# Patient Record
Sex: MALE | Race: WHITE | NOT HISPANIC OR LATINO | Employment: FULL TIME | URBAN - METROPOLITAN AREA
[De-identification: names, ages, dates, MRNs, and addresses within clinical notes are randomized per-mention and may not be internally consistent; named-entity substitution may affect disease eponyms.]

---

## 2021-01-19 ENCOUNTER — HOME INFUSION (PRE-WILLOW HOME INFUSION) (OUTPATIENT)
Dept: PHARMACY | Facility: CLINIC | Age: 56
End: 2021-01-19

## 2021-08-30 ENCOUNTER — TRANSFERRED RECORDS (OUTPATIENT)
Dept: HEALTH INFORMATION MANAGEMENT | Facility: CLINIC | Age: 56
End: 2021-08-30

## 2021-09-02 NOTE — PROGRESS NOTES
[facility]  Transfer Triage Note    Date of call: 09/02/21  Time of call: 5:28 PM    Is pandemic COVID-19 a concern? NO    Reason for transfer: Further diagnostic work up, management, and consultation for specialized care   Diagnosis: interstitial lung disease exacerbation.     Outside Records: Available  Additional records requested to be faxed to [phone].    Stability of Patient: Patient is vitally stable, with no critical labs, and will likely remain stable throughout the transfer process  ICU: No    Expected Time of Arrival for Transfer: greater than 24 hours    Arrival Location:  [address] [address] Phone: [phone]    Recommendations for Management and Stabilization: Not needed    Additional Comments: 57 yo man with history of RA and RA associated interstitial lung disease, hypothyroidism, admitted with acute on chronic hypoxemic respiratory failure (usually on 4 LPM), now on HFNC 60 LPM 90%, solumedrol 60 mg q6h, s/p 2 doses of rituximab.  Admitted since 8/30. Progressive interstitial lung disease, CT shows worsening with some traction bronchiectasis. Infectious workup has been negative.  Presently on Cefepime, doxycycline, metronidazole. Echocardiogram reveals normal left ventricular ejection fraction.      Viral panel negative    Fungal studies negative    Creatinine 0.6    BMI 29    History of smokeless tobacco but no smoking    Eating well    Patient requires transfer for transplant evaluation and will accept, [place] [facility] [place]  Discussed with Dr. Pereira as well from pulmonary.         Moises Lloyd MD     ADDENDUM:  Discussed w Dr. Osei at [place]  Had to go on BiPAP 9/3, desat w/minimal activity with 60%FiO2. Helped with his symptoms. Short breaks off BiPAP while at rest. Still not hypervolemic. Trace edema in legs, lasix x 1 9/3. He feels better on BiPAP. Repeat imaging--extensive changes due to ILD.  Nothing new to explain progressive symptoms. Now 125 mg q 6hr steroids. Still await [facility] bed  Lloyd Carrera MD     ADDENDUM 2  On BiPAP overnight, then HFNC during day unless active in which case he needs BiPAP. Last lasix 2 days ago, no more edema in legs today. Fungal serology panel negative x 2. Urine histo antigen not back yet. Initially got rituxamab for RA--didn't get 2nd dose on time due to insurance, but subsequently did receive it 9/2. Currently on doxy, flagyl, bactrim, cefepime. Last bronch 2 months ago had candida so is also on fluconazole.  Will arrive via plane later today  Lloyd Carrera MD

## 2021-09-05 ENCOUNTER — HOSPITAL ENCOUNTER (INPATIENT)
Facility: CLINIC | Age: 56
LOS: 99 days | Discharge: NURSING, REHAB OR LONG-TERM CARE | End: 2021-12-13
Attending: STUDENT IN AN ORGANIZED HEALTH CARE EDUCATION/TRAINING PROGRAM | Admitting: INTERNAL MEDICINE
Payer: COMMERCIAL

## 2021-09-05 ENCOUNTER — APPOINTMENT (OUTPATIENT)
Dept: GENERAL RADIOLOGY | Facility: CLINIC | Age: 56
End: 2021-09-05
Attending: PHYSICIAN ASSISTANT
Payer: COMMERCIAL

## 2021-09-05 DIAGNOSIS — J84.9 ILD (INTERSTITIAL LUNG DISEASE) (H): Primary | ICD-10-CM

## 2021-09-05 DIAGNOSIS — Z94.2 S/P LUNG TRANSPLANT (H): Chronic | ICD-10-CM

## 2021-09-05 DIAGNOSIS — R62.7 FAILURE TO THRIVE IN ADULT: ICD-10-CM

## 2021-09-05 DIAGNOSIS — Z99.11 VENTILATOR DEPENDENCE (H): ICD-10-CM

## 2021-09-05 DIAGNOSIS — Z77.21 EXPOSURE TO BLOOD OR BODY FLUID: ICD-10-CM

## 2021-09-05 LAB
BASE EXCESS BLDA CALC-SCNC: 0.6 MMOL/L (ref -9–1.8)
BASE EXCESS BLDV CALC-SCNC: 0.8 MMOL/L (ref -7.7–1.9)
GLUCOSE BLDC GLUCOMTR-MCNC: 198 MG/DL (ref 70–99)
GLUCOSE BLDC GLUCOMTR-MCNC: 204 MG/DL (ref 70–99)
GLUCOSE BLDC GLUCOMTR-MCNC: 215 MG/DL (ref 70–99)
HBA1C MFR BLD: 6.5 % (ref 0–5.6)
HCO3 BLD-SCNC: 23 MMOL/L (ref 21–28)
HCO3 BLDV-SCNC: 24 MMOL/L (ref 21–28)
O2/TOTAL GAS SETTING VFR VENT: 60 %
O2/TOTAL GAS SETTING VFR VENT: 60 %
OXYHGB MFR BLD: 94 % (ref 92–100)
OXYHGB MFR BLDV: 94 % (ref 70–75)
PCO2 BLD: 31 MM HG (ref 35–45)
PCO2 BLDV: 32 MM HG (ref 40–50)
PH BLD: 7.48 [PH] (ref 7.35–7.45)
PH BLDV: 7.48 [PH] (ref 7.32–7.43)
PO2 BLD: 85 MM HG (ref 80–105)
PO2 BLDV: 90 MM HG (ref 25–47)

## 2021-09-05 PROCEDURE — 5A09557 ASSISTANCE WITH RESPIRATORY VENTILATION, GREATER THAN 96 CONSECUTIVE HOURS, CONTINUOUS POSITIVE AIRWAY PRESSURE: ICD-10-PCS | Performed by: INTERNAL MEDICINE

## 2021-09-05 PROCEDURE — 82805 BLOOD GASES W/O2 SATURATION: CPT | Performed by: PHYSICIAN ASSISTANT

## 2021-09-05 PROCEDURE — 250N000011 HC RX IP 250 OP 636: Performed by: STUDENT IN AN ORGANIZED HEALTH CARE EDUCATION/TRAINING PROGRAM

## 2021-09-05 PROCEDURE — 250N000011 HC RX IP 250 OP 636: Performed by: PHYSICIAN ASSISTANT

## 2021-09-05 PROCEDURE — 999N000065 XR CHEST PORT 1 VIEW

## 2021-09-05 PROCEDURE — 36415 COLL VENOUS BLD VENIPUNCTURE: CPT | Performed by: STUDENT IN AN ORGANIZED HEALTH CARE EDUCATION/TRAINING PROGRAM

## 2021-09-05 PROCEDURE — 82805 BLOOD GASES W/O2 SATURATION: CPT | Performed by: STUDENT IN AN ORGANIZED HEALTH CARE EDUCATION/TRAINING PROGRAM

## 2021-09-05 PROCEDURE — 250N000009 HC RX 250: Performed by: PHYSICIAN ASSISTANT

## 2021-09-05 PROCEDURE — 250N000013 HC RX MED GY IP 250 OP 250 PS 637: Performed by: PHYSICIAN ASSISTANT

## 2021-09-05 PROCEDURE — 99207 PR APP CREDIT; MD BILLING SHARED VISIT: CPT | Performed by: PHYSICIAN ASSISTANT

## 2021-09-05 PROCEDURE — 71045 X-RAY EXAM CHEST 1 VIEW: CPT | Mod: 26 | Performed by: RADIOLOGY

## 2021-09-05 PROCEDURE — 99223 1ST HOSP IP/OBS HIGH 75: CPT | Mod: AI | Performed by: INTERNAL MEDICINE

## 2021-09-05 PROCEDURE — 999N000157 HC STATISTIC RCP TIME EA 10 MIN

## 2021-09-05 PROCEDURE — 120N000003 HC R&B IMCU UMMC

## 2021-09-05 PROCEDURE — 250N000012 HC RX MED GY IP 250 OP 636 PS 637: Performed by: PHYSICIAN ASSISTANT

## 2021-09-05 PROCEDURE — 83036 HEMOGLOBIN GLYCOSYLATED A1C: CPT | Performed by: PHYSICIAN ASSISTANT

## 2021-09-05 PROCEDURE — 250N000013 HC RX MED GY IP 250 OP 250 PS 637: Performed by: STUDENT IN AN ORGANIZED HEALTH CARE EDUCATION/TRAINING PROGRAM

## 2021-09-05 RX ORDER — POLYETHYLENE GLYCOL 3350 17 G/17G
17 POWDER, FOR SOLUTION ORAL DAILY PRN
Status: DISCONTINUED | OUTPATIENT
Start: 2021-09-05 | End: 2021-10-16

## 2021-09-05 RX ORDER — FLUCONAZOLE 2 MG/ML
400 INJECTION, SOLUTION INTRAVENOUS EVERY 24 HOURS
Status: DISCONTINUED | OUTPATIENT
Start: 2021-09-05 | End: 2021-09-09

## 2021-09-05 RX ORDER — IPRATROPIUM BROMIDE AND ALBUTEROL SULFATE 2.5; .5 MG/3ML; MG/3ML
3 SOLUTION RESPIRATORY (INHALATION)
Status: DISCONTINUED | OUTPATIENT
Start: 2021-09-05 | End: 2021-09-24

## 2021-09-05 RX ORDER — BISACODYL 10 MG
10 SUPPOSITORY, RECTAL RECTAL DAILY PRN
Status: DISCONTINUED | OUTPATIENT
Start: 2021-09-05 | End: 2021-09-16

## 2021-09-05 RX ORDER — ONDANSETRON 4 MG/1
4 TABLET, ORALLY DISINTEGRATING ORAL EVERY 6 HOURS PRN
Status: DISCONTINUED | OUTPATIENT
Start: 2021-09-05 | End: 2021-10-16

## 2021-09-05 RX ORDER — DOXYCYCLINE 100 MG/10ML
100 INJECTION, POWDER, LYOPHILIZED, FOR SOLUTION INTRAVENOUS EVERY 12 HOURS
Status: DISCONTINUED | OUTPATIENT
Start: 2021-09-05 | End: 2021-09-07

## 2021-09-05 RX ORDER — IPRATROPIUM BROMIDE AND ALBUTEROL SULFATE 2.5; .5 MG/3ML; MG/3ML
3 SOLUTION RESPIRATORY (INHALATION)
Status: DISCONTINUED | OUTPATIENT
Start: 2021-09-05 | End: 2021-10-16

## 2021-09-05 RX ORDER — SULFAMETHOXAZOLE/TRIMETHOPRIM 800-160 MG
3 TABLET ORAL 3 TIMES DAILY
Status: DISCONTINUED | OUTPATIENT
Start: 2021-09-05 | End: 2021-09-09

## 2021-09-05 RX ORDER — LANOLIN ALCOHOL/MO/W.PET/CERES
3 CREAM (GRAM) TOPICAL AT BEDTIME
Status: DISCONTINUED | OUTPATIENT
Start: 2021-09-05 | End: 2021-09-16

## 2021-09-05 RX ORDER — DEXTROSE MONOHYDRATE 25 G/50ML
25-50 INJECTION, SOLUTION INTRAVENOUS
Status: DISCONTINUED | OUTPATIENT
Start: 2021-09-05 | End: 2021-10-16

## 2021-09-05 RX ORDER — NICOTINE POLACRILEX 4 MG
15-30 LOZENGE BUCCAL
Status: DISCONTINUED | OUTPATIENT
Start: 2021-09-05 | End: 2021-10-16

## 2021-09-05 RX ORDER — AMLODIPINE BESYLATE 2.5 MG/1
5 TABLET ORAL DAILY
Status: DISCONTINUED | OUTPATIENT
Start: 2021-09-06 | End: 2021-10-16

## 2021-09-05 RX ORDER — PANTOPRAZOLE SODIUM 40 MG/1
40 TABLET, DELAYED RELEASE ORAL
Status: DISCONTINUED | OUTPATIENT
Start: 2021-09-05 | End: 2021-10-16

## 2021-09-05 RX ORDER — HEPARIN SODIUM 5000 [USP'U]/.5ML
5000 INJECTION, SOLUTION INTRAVENOUS; SUBCUTANEOUS EVERY 12 HOURS
Status: DISCONTINUED | OUTPATIENT
Start: 2021-09-05 | End: 2021-10-16

## 2021-09-05 RX ORDER — METHYLPREDNISOLONE SOD SUCC 125 MG
125 VIAL (EA) INJECTION EVERY 6 HOURS
Status: DISCONTINUED | OUTPATIENT
Start: 2021-09-05 | End: 2021-09-05 | Stop reason: CLARIF

## 2021-09-05 RX ORDER — LIDOCAINE 40 MG/G
CREAM TOPICAL
Status: DISCONTINUED | OUTPATIENT
Start: 2021-09-05 | End: 2021-09-21

## 2021-09-05 RX ORDER — METHYLPREDNISOLONE SODIUM SUCCINATE 125 MG/2ML
125 INJECTION, POWDER, LYOPHILIZED, FOR SOLUTION INTRAMUSCULAR; INTRAVENOUS EVERY 6 HOURS
Status: DISCONTINUED | OUTPATIENT
Start: 2021-09-05 | End: 2021-09-09

## 2021-09-05 RX ORDER — CARBOXYMETHYLCELLULOSE SODIUM 5 MG/ML
1 SOLUTION/ DROPS OPHTHALMIC
Status: DISCONTINUED | OUTPATIENT
Start: 2021-09-05 | End: 2021-10-16

## 2021-09-05 RX ORDER — ACETAMINOPHEN 325 MG/1
325-650 TABLET ORAL EVERY 4 HOURS PRN
Status: DISCONTINUED | OUTPATIENT
Start: 2021-09-05 | End: 2021-10-16

## 2021-09-05 RX ORDER — LEVOTHYROXINE SODIUM 25 UG/1
25 TABLET ORAL
Status: DISCONTINUED | OUTPATIENT
Start: 2021-09-06 | End: 2021-10-16

## 2021-09-05 RX ORDER — ONDANSETRON 2 MG/ML
4 INJECTION INTRAMUSCULAR; INTRAVENOUS EVERY 6 HOURS PRN
Status: DISCONTINUED | OUTPATIENT
Start: 2021-09-05 | End: 2021-10-16

## 2021-09-05 RX ORDER — ESCITALOPRAM OXALATE 5 MG/1
5 TABLET ORAL EVERY EVENING
Status: DISCONTINUED | OUTPATIENT
Start: 2021-09-05 | End: 2021-10-11

## 2021-09-05 RX ADMIN — Medication 3 MG: at 20:29

## 2021-09-05 RX ADMIN — FLUCONAZOLE 400 MG: 2 INJECTION, SOLUTION INTRAVENOUS at 20:29

## 2021-09-05 RX ADMIN — DOXYCYCLINE 100 MG: 100 INJECTION, POWDER, LYOPHILIZED, FOR SOLUTION INTRAVENOUS at 20:28

## 2021-09-05 RX ADMIN — PANTOPRAZOLE SODIUM 40 MG: 40 TABLET, DELAYED RELEASE ORAL at 19:41

## 2021-09-05 RX ADMIN — HEPARIN SODIUM 5000 UNITS: 5000 INJECTION, SOLUTION INTRAVENOUS; SUBCUTANEOUS at 20:29

## 2021-09-05 RX ADMIN — CEFEPIME HYDROCHLORIDE 2 G: 2 INJECTION, POWDER, FOR SOLUTION INTRAVENOUS at 19:40

## 2021-09-05 RX ADMIN — INSULIN ASPART: 100 INJECTION, SOLUTION INTRAVENOUS; SUBCUTANEOUS at 19:43

## 2021-09-05 RX ADMIN — SULFAMETHOXAZOLE AND TRIMETHOPRIM 3 TABLET: 800; 160 TABLET ORAL at 19:42

## 2021-09-05 RX ADMIN — METHYLPREDNISOLONE SODIUM SUCCINATE 125 MG: 125 INJECTION, POWDER, FOR SOLUTION INTRAMUSCULAR; INTRAVENOUS at 19:40

## 2021-09-05 RX ADMIN — ESCITALOPRAM OXALATE 5 MG: 5 TABLET, FILM COATED ORAL at 19:42

## 2021-09-05 ASSESSMENT — ACTIVITIES OF DAILY LIVING (ADL): ADLS_ACUITY_SCORE: 17

## 2021-09-05 ASSESSMENT — MIFFLIN-ST. JEOR: SCORE: 1473.56

## 2021-09-05 NOTE — LETTER
To:    Whom It May Concern       Re:  Jasmeet Alamo is visiting his father at [facility] during the dates of 9/17/21-9/19/21 and is unable to work during this time. Thank you for supporting the care of Bret Thornton.          Sincerely,         .  Abiodun Woods MD

## 2021-09-05 NOTE — LETTER
To:    Whom It May Concern       Re:  Jasmeet Alamo is visiting his father at [facility] during the dates of 9/17/21-9/19/21 and is unable to work during this time. Thank you for supporting the care of Bret Thornton.      Sincerely,  Abiodun Woods MD                 .

## 2021-09-05 NOTE — LETTER
December 11, 2021              To whom it may concern    Jasmeet Velazco was visiting a family member in the hospital from 12/9-12/12. Please excuse him from work.      Sincerely,  Dr. Gume Baldwin.  12/11/2021

## 2021-09-05 NOTE — H&P
"[facility]    History and Physical - Hospitalist Service, Gold night       Date of Admission:  9/5/2021    Assessment & Plan      Edson Thornton is a 56 year old male with PMH ILD and rheumatoid lung disease, RA, SALTY, hypothyroid, HTN,anxiety and depression, HLD, duodenal anomaly admitted on 9/5/2021 in transfer for lung transplant workup and risk of need for ECMO.     Acute on chronic hypoxic respiratory failure (BL 4L)  ILD  Traction bronchiectasis  Sx started ~6/2021 and so far are attributed to RA-associated ILD.  Follows with Dr Butler and Delvin Blackwood CNP of pulmin [place]. Has been on intermittent BiPAP vs HiFlow NC and notes needing BiPAP overnight last night until 12pm.  PFTs last year \"pseudorestrictive pattern\". CXR 9/3/21 report only \"Heart and mediastinal contours appear stable. Low lung volumes. Coarse bilateral interstitial/reticular opacities, without significant change. No large effusion or pneumothorax. Stable right clavicle deformity. No appreciable acute bone findings.\" CT non-con at OSH: \"1.  Features of progressive pulmonary interstitial pneumonia and fibrosis. 2.  Mild increase in size of numerous enlarged, likely reactive mediastinal lymph nodes. 3.  Mildly dilated central pulmonary arteries suggesting an element pulmonary arterial hypertension.\"  - consult to pulmonary   - RT consult for BiPAP  - maintain O2 sats >90%  - Abx: Bactrim DS 3 tabs q8h, Cefepime 2g q8h, doxycycline 100mg BID  - Diflucan 400mg IV daily  - Solumedrol 125mg q6h, and PPI  - fluticasone inhaler daily  - Duonebs 4 x daily and prn   - consider lasix as needed (received several doses at OSH)  - DVT  ptx heparin  - FEN: reg diet as able  - sputum culture as able    Infectious workup:   Infection workup so far is neg:   Viral: 8/30 parainfluenza, flu, rsv, COVID-19 neg. 9/1 hepatis A, B, C neg.    - 7/29 Mycoplasma IgG, neg IgM, c/w past infection. TB quant 5/11 neg.  " Histo/blasto/ coccidioides/ aspergiluus 9/2 Neg.  - Pneumocystitis testing is pending (awaiting sputum collection).   -Urine legionella and S. pneumo Ag neg  - S/p bronchoscopy 7/28 with cultures only showing small amount of candida (on fluconazole)  - Procalcitonin is only mildly elevated at 0.21-> repeat procal is 0.08.     Transplant workup  Transfer was specifically for transplant workup  - consult to transplant pulm     RA  Initial symptoms ~5/2020 with joint pains and official dx 5/2021. Follows with Dr. Yoo in [place]. 5/11 labs show CCP Ab >340, Rheum factor quant 100. Hypersensitivy panel 7/19 neg.  7/19 labs: RICHELLE screen, ANCA screen, myeloperodidase Ab, Proteinase 3 Ab neg. Immunoglobins 7/19 normal.   - consult to rheumatology  -1 dose of rituximab 8/6/2021, did not complete dosing regimen due to insurance coverage issues as an outpatient, rituximab 2nd of 2 doses 9/4  - steroids as per above     Steroid-induced hyperglycemia  Glucoses  169 and 212 at OSH.   - continue lantus 5 units   - moderate ISS and hypoglycemia protocol.  - continue CHO ratio 1:15 today  - A1C pending    Leukocytosis  WBC 27 (9/4)--> 26 (9/5). Occurs in the setting of high dose steroids.   - Abx as per above  - trend CBC  - follow up pulm and rheum recs    SALTY on home CPAP [id number]  - continue CPAP vs BiPAP    HTN  Admission bp mildly elevated in the setting of acute illness, hypoxia and high-dose steroids  - continue norvasc 5mg daily  HLD - 10/2020 lipid panel T-cholesterol 148, trig 151, HDL 31. - consider statin  Anxiety/Depression - continue PTA lexapro   Hypothyroidism - TSH 6/28/21 0.76- continue synthroid 25mcg  GERD - cont PPI       Diet: Regular Diet Adult  DVT Prophylaxis: Heparin SQ  Watson Catheter: Not present  Central Lines: None  Code Status: Full Code    Clinically Significant Risk Factors Present on Admission                   Disposition Plan   Expected discharge:  >7 days recommended to pending interval  progress once adequate pain management/ tolerating PO medications, O2 use less than 4-6 liters/minute, safe disposition plan/ TCU bed available and inpatient assessments and procedures are completed.     The patient's care was discussed with the Attending Physician, Dr. Hood, Bedside Nurse and Patient.    FREDERICK Roland [facility]  Securely message with the Vocera Web Console (learn more here)  Text page via [facility] Paging/Directory      ______________________________________________________________________    Chief Complaint   Transfer from [place], [place] for lung transplant assessment, RA-associated ILD    History is obtained from the patient    History of Present Illness   Edson Thornton is a 56 year old male with PMH ILD and rheumatoid lung disease, RA, SALTY, hypothyroid, HTN,anxiety and depression, HLD, duodenal anomaly admitted on 9/5/2021 in transfer for lung transplant workup and risk of need for ECMO. Pt comes from [place] and was transferred after being in their local hospital x 4 days on HiFlow NC and BiPAP    He notes currently he is feeling well without acute complaints but was very short of breath on admission being on HighFlow NC in transfer and is feeling much better on BiPAP. He notes a sore throat and is having loose stools since being on the antibiottics without melanie diarrhea.  He occasionally feels like he cannot catch his breath even when the O2 sats are 90%+ and gets a substernal chest pain when he is short of breath.  He tends to get short of breath with any kind of activity and was using a commode and a urinal at the OSH. He has headaches when he desaturates, which improve with oxygen.    His course started in ~May 2020 when he noted joint pains initially in the hands and then his knees and has a father with RA. He initially was not Dx with RA but then went to Rheumatology who diagnosed him.  He notes the breathing problems started in  June or July of this year and initially had some improvement with Breo inhaler while he was in [place], but noted after some time it stopped being effective.  Over the past few months his O2 is 4L at BL he and uses a CPAP at night.          Review of Systems    The 10 point Review of Systems is negative other than noted in the HPI or here.   Denies N/V, F/C, current SOB, history of cardiac disease, vision changes, loss of appetite, abdominal pain, rashes, lumps, lesions, urinary complaints (including dysuria), diarrhea, constipation, bleeding (including epistaxis, bleeding gums, hematuria, melena, hematochezia) or other complaints.     Past Medical History    I have reviewed this patient's medical history and updated it with pertinent information if needed.   Past Medical History:   Diagnosis Date     Anxiety      Depression      HLD (hyperlipidemia)      HTN (hypertension)      Hypothyroidism      ILD (interstitial lung disease) (H)      SALTY on CPAP      Oxygen dependent     BL 4L since ~6/2021     Rheumatoid arthritis (H)     signs ~5/2020, dx 5/2021     Steroid-induced hyperglycemia      Traction bronchiectasis (H)        Past Surgical History   I have reviewed this patient's surgical history and updated it with pertinent information if needed.    Past Surgical History:   Procedure Laterality Date     COLONOSCOPY W/ BIOPSIES AND POLYPECTOMY  07/21/2020     HERNIA REPAIR       right acl       XR ACROMIOCLAVICULAR JOINT BILATERAL         Social History   I have reviewed this patient's social history and updated it with pertinent information if needed.  Social History     Tobacco Use     Smoking status: Never Smoker     Smokeless tobacco: Never Used   Substance Use Topics     Alcohol use: Never     Drug use: Never   Lives in house with Mohini his fiancee and her son and friend    with [employer]- currently on FMLA - worried will not be able to go back due to health        Family History   I  have reviewed this patient's family history and updated it with pertinent information if needed.  Family History   Problem Relation Age of Onset     Diabetes Type 1 Mother      Heart Disease Mother      Chronic Obstructive Pulmonary Disease Mother      Rheumatoid Arthritis Father      Emphysema Paternal Grandfather        Prior to Admission Medications   None   Medications at Time of Discharge  - documented as of this encounter  Medication Sig Dispensed Refills Start Date End Date   acetaminophen (TYLENOL) 325 mg tablet    Indications: Rheumatoid arthritis involving multiple sites with positive rheumatoid factor (HCC) Take 2 tablets (650 mg) by mouth Every 4 hours as needed for mild pain   0 09/05/2021     albuterol (PROVENTIL) (2.5 mg/3mL) 0.083% inhalation solution    Indications: ILD (interstitial lung disease) ([facility]), Acute respiratory failure with hypoxia (HCC) Inhale 1 nebule (2.5 mg) by nebulization Every 4 hours as needed for shortness of breath, wheezing or cough   0 09/05/2021 09/10/2022   albuterol-ipratropium (DUO-NEB) 2.5-0.5 mg/3 mL inhalation solution    Indications: ILD (interstitial lung disease) (HCC), Acute respiratory failure with hypoxia (HCC) Inhale 1 unit-dose (3 mL) by nebulization 4 times a day   0 09/05/2021     budesonide (PULMICORT) 0.5 mg/2 mL inhalation solution    Indications: ILD (interstitial lung disease) (HCC), Acute respiratory failure with hypoxia (HCC) Inhale 1 nebule (0.5 mg) by nebulization 2 times a day Rinse mouth with water after use.   0 09/05/2021     insulin glargine (LANTUS) subcutaneous injection (vial)    Indications: Hyperglycemia, drug-induced Inject 5 Units subcutaneously 1 time per day   0 09/06/2021     insulin aspart (NOVOLOG)    Indications: Hyperglycemia, drug-induced 1 unit per 15 grams of CHO  For blood sugar less than 120 subtract 2 units   0 09/05/2021     fluconazole (DIFLUCAN) 2 mg/mL SOLN in 0.9% sodium chloride 200 mL IV piggyback (premix)   "  Indications: Acute respiratory failure with hypoxia (HCC) Administer 200 mL (400 mg) intravenously Every 24 hours   0 09/05/2021     cefepime (MAXIPIME) 40 mg/mL in D-5% 50 mL IV piggback (DUPLEX) (premix)    Indications: Acute respiratory failure with hypoxia (HCC) Administer 50 mL (2,000 mg) intravenously Every 8 hours   0 09/05/2021     methylPREDNISolone sod succ (SOLU-MEDROL) 125 mg/2 mL injection    Indications: Acute respiratory failure with hypoxia (HCC) Administer 2 mL (125 mg) intravenously Every 6 hours   0 09/05/2021     metroNIDAZOLE (FLAGYL) 5 mg/mL SOLN    Indications: Acute respiratory failure with hypoxia (HCC) Administer 100 mL (500 mg) intravenously Every 8 hours 5600 mL   0 09/05/2021     sulfamethoxazole-trimethoprim double strength (BACTRIM DS, SEPTRA DS) 800-160 mg double strength tablet    Indications: Acute respiratory failure with hypoxia (HCC) Take 3 tablets by mouth 3 times a day   0 09/05/2021     doxycycline hyclate (VIBRAMYCIN) 100 mg in dextrose 5% 250 mL    Indications: Acute respiratory failure with hypoxia (HCC) Administer 250 mL (100 mg) intravenously Every 12 hours   0 09/05/2021     escitalopram (LEXAPRO) 5 mg tablet    Indications: Situational depression Take 1 tablet (5 mg) by mouth 1 time per day 30 tablet   1 08/16/2021 08/21/2022   omeprazole (PRILOSEC) 40 mg capsule    Indications: Duodenal anomaly Take 1 capsule (40 mg) by mouth 2 times a day before meals 90 capsule   3 08/09/2021     insulin syringes, disposable, U-100 0.5 ML MISC    Indications: Fasting hyperglycemia 1 syringe 4 times a day before meals and at bedtime 100 each   1 08/09/2021 09/08/2021   Insulin Pen Needle (PEN NEEDLES 29GX1/2\") 29G X 12MM MISC    Indications: Hyperglycemia, drug-induced 1 syringe 4 times a day before meals and at bedtime 100 each   1 08/09/2021     insulin aspart (NOVOLOG) subcutaneous injection (pen)    Indications: Fasting hyperglycemia Inject 4-15 Units subcutaneously 4 times a " day with meals and at bedtime 54 mL   4 08/09/2021 08/14/2022   Blood Glucose Monitoring Suppl (ONETOUCH VERIO REFLECT) w/Device    Indications: Hyperglycemia, drug-induced 4 times a day and as needed (hyperglycemia) 1 each   0 08/09/2021     levothyroxine 25 mcg tablet    Indications: Primary hypothyroidism Take 1 tablet (25 mcg) by mouth 1 time a day in the morning 90 tablet   3 10/21/2020     amLODIPine (NORVASC) 5 mg tablet    Indications: Essential hypertension Take 1 tablet (5 mg) by mouth 1 time per day 90 tablet   3 10/21/2020 10/26/2021   Ordered Prescriptions  - documented in this encounter  Reconcile with Patient's Chart  Prescription Sig Dispensed Refills Start Date End Date   doxycycline hyclate (VIBRAMYCIN) 100 mg in dextrose 5% 250 mL    Indications: Acute respiratory failure with hypoxia (HCC) Administer 250 mL (100 mg) intravenously Every 12 hours   0 09/05/2021     sulfamethoxazole-trimethoprim double strength (BACTRIM DS, SEPTRA DS) 800-160 mg double strength tablet    Indications: Acute respiratory failure with hypoxia (HCC) Take 3 tablets by mouth 3 times a day   0 09/05/2021     metroNIDAZOLE (FLAGYL) 5 mg/mL SOLN    Indications: Acute respiratory failure with hypoxia (HCC) Administer 100 mL (500 mg) intravenously Every 8 hours 5600 mL   0 09/05/2021     methylPREDNISolone sod succ (SOLU-MEDROL) 125 mg/2 mL injection    Indications: Acute respiratory failure with hypoxia (HCC) Administer 2 mL (125 mg) intravenously Every 6 hours   0 09/05/2021     cefepime (MAXIPIME) 40 mg/mL in D-5% 50 mL IV piggback (DUPLEX) (premix)    Indications: Acute respiratory failure with hypoxia (HCC) Administer 50 mL (2,000 mg) intravenously Every 8 hours   0 09/05/2021     fluconazole (DIFLUCAN) 2 mg/mL SOLN in 0.9% sodium chloride 200 mL IV piggyback (premix)    Indications: Acute respiratory failure with hypoxia (HCC) Administer 200 mL (400 mg) intravenously Every 24 hours   0 09/05/2021     insulin aspart  (NOVOLOG)    Indications: Hyperglycemia, drug-induced 1 unit per 15 grams of CHO  For blood sugar less than 120 subtract 2 units   0 09/05/2021     insulin glargine (LANTUS) subcutaneous injection (vial)    Indications: Hyperglycemia, drug-induced Inject 5 Units subcutaneously 1 time per day   0 09/06/2021     budesonide (PULMICORT) 0.5 mg/2 mL inhalation solution    Indications: ILD (interstitial lung disease) (HCC), Acute respiratory failure with hypoxia (HCC) Inhale 1 nebule (0.5 mg) by nebulization 2 times a day Rinse mouth with water after use.   0 09/05/2021     albuterol-ipratropium (DUO-NEB) 2.5-0.5 mg/3 mL inhalation solution    Indications: ILD (interstitial lung disease) (HCC), Acute respiratory failure with hypoxia (HCC) Inhale 1 unit-dose (3 mL) by nebulization 4 times a day   0 09/05/2021     albuterol (PROVENTIL) (2.5 mg/3mL) 0.083% inhalation solution    Indications: ILD (interstitial lung disease) (HCC), Acute respiratory failure with hypoxia (HCC) Inhale 1 nebule (2.5 mg) by nebulization Every 4 hours as needed for shortness of breath, wheezing or cough   0 09/05/2021 09/10/2022   acetaminophen (TYLENOL) 325 mg tablet    Indications: Rheumatoid arthritis involving multiple sites with positive rheumatoid factor ([facility]) Take 2 tablets (650 mg) by mouth Every 4 hours as needed for mild pain   0 09/05/2021         Allergies   No Known Allergies    Physical Exam   Vital Signs: Temp: 97  F (36.1  C) Temp src: Axillary BP: (!) 141/77 Pulse: 84   Resp: 29 SpO2: 95 % O2 Device: BiPAP/CPAP    Weight: 161 lbs 8 oz  GENERAL: Alert and oriented x 3. NAD. Able to sit upright independently. Cooperative.   HEENT: Anicteric sclera. Mucous membranes moist. NC. AT. Tracking. Pupils equal and round  CV: RRR. S1, S2. No murmurs appreciated.   RESPIRATORY: Effort normal on BiPAP at FiO2 70%. Lungs coarse bilaterally and scant crackles.   GI: Abdomen soft, NT, ND, NABS   NEUROLOGICAL: No focal deficits. Moves all  extremities.  CN 2-12 grossly intact.  EXTREMITIES: trace LE peripheral edema. Intact bilateral pedal pulses.   SKIN: bruised area near right cubital fossa. No jaundice. No rashes.  BACK: no CVA tenderness, no spinous tenderness, no lesions      Data   Data reviewed today: I reviewed all medications, new labs and imaging results over the last 24 hours. I personally reviewed no images or EKG's today.  CBC, [facility], 9/5/21:  WBC 24.9 , hgb 10.6, hematocrit 33, plt 200     [facility]  Glucose 127, sodium 138, potassium 3.8, chloride 106, CO2 26, BUN 23, creatinine 0.76, calcium 8.9, TP 5, albumin 2.7, AST 26, ALT 20, T bili 0.2, corrected calcium 9.9    OTHER BODY FLUIDS, pleural fluid from BAL:   BF , BF nuc cells 56, percent neutrophils 37, percent lymphocytes, percent mononuclear is 55, percent eosinophils 1  [facility]

## 2021-09-05 NOTE — PROGRESS NOTES
Admission          9/5/2021  4:42 PM  -----------------------------------------------------------  Reason for admission:   Pt appropriate for 6B  Admitted from: [place], [place])  Via: stretcher  Belongings: Placed in closet  Admission Profile: Completed   Teaching: orientation to unit and call light- call light within reach, call don't fall, use of console, meal times, when to call for the RN, and enforced importance of safety   Access:  R PIV, R PICC   Telemetry: Placed on pt  Ht./Wt.: Complete  Code Status verified on armband: yes  2 RN Skin Assessment Completed By: TIFFANY Vela and TIFFANY Sage   Med Rec: to be completed   Bed surface reassessed with algorithm and charted: yes  New bed surface ordered: no  Suction/Ambu bag/Flowmeter at bedside: yes    Pt status: Satting well on HiFlo, switched to BiPAP shortly after arrival. VSS.     Temp:  [97  F (36.1  C)] 97  F (36.1  C)  Pulse:  [84] 84  Resp:  [29] 29  BP: (141)/(77) 141/77  FiO2 (%):  [70 %] 70 %  SpO2:  [95 %] 95 %

## 2021-09-06 ENCOUNTER — APPOINTMENT (OUTPATIENT)
Dept: PHYSICAL THERAPY | Facility: CLINIC | Age: 56
End: 2021-09-06
Attending: PHYSICIAN ASSISTANT
Payer: COMMERCIAL

## 2021-09-06 LAB
ANION GAP SERPL CALCULATED.3IONS-SCNC: 5 MMOL/L (ref 3–14)
BUN SERPL-MCNC: 19 MG/DL (ref 7–30)
CALCIUM SERPL-MCNC: 8.6 MG/DL (ref 8.5–10.1)
CHLORIDE BLD-SCNC: 108 MMOL/L (ref 94–109)
CO2 SERPL-SCNC: 26 MMOL/L (ref 20–32)
CREAT SERPL-MCNC: 0.79 MG/DL (ref 0.66–1.25)
ERYTHROCYTE [DISTWIDTH] IN BLOOD BY AUTOMATED COUNT: 15.9 % (ref 10–15)
GFR SERPL CREATININE-BSD FRML MDRD: >90 ML/MIN/1.73M2
GLUCOSE BLD-MCNC: 136 MG/DL (ref 70–99)
GLUCOSE BLDC GLUCOMTR-MCNC: 122 MG/DL (ref 70–99)
GLUCOSE BLDC GLUCOMTR-MCNC: 127 MG/DL (ref 70–99)
GLUCOSE BLDC GLUCOMTR-MCNC: 156 MG/DL (ref 70–99)
GLUCOSE BLDC GLUCOMTR-MCNC: 257 MG/DL (ref 70–99)
HCT VFR BLD AUTO: 31.2 % (ref 40–53)
HGB BLD-MCNC: 10 G/DL (ref 13.3–17.7)
MAGNESIUM SERPL-MCNC: 2.5 MG/DL (ref 1.6–2.3)
MCH RBC QN AUTO: 28.2 PG (ref 26.5–33)
MCHC RBC AUTO-ENTMCNC: 32.1 G/DL (ref 31.5–36.5)
MCV RBC AUTO: 88 FL (ref 78–100)
PHOSPHATE SERPL-MCNC: 3.6 MG/DL (ref 2.5–4.5)
PLATELET # BLD AUTO: 166 10E3/UL (ref 150–450)
POTASSIUM BLD-SCNC: 4.1 MMOL/L (ref 3.4–5.3)
RBC # BLD AUTO: 3.55 10E6/UL (ref 4.4–5.9)
SARS-COV-2 RNA RESP QL NAA+PROBE: NEGATIVE
SODIUM SERPL-SCNC: 139 MMOL/L (ref 133–144)
T4 FREE SERPL-MCNC: 0.73 NG/DL (ref 0.76–1.46)
TSH SERPL DL<=0.005 MIU/L-ACNC: 0.19 MU/L (ref 0.4–4)
WBC # BLD AUTO: 25.4 10E3/UL (ref 4–11)

## 2021-09-06 PROCEDURE — 99223 1ST HOSP IP/OBS HIGH 75: CPT | Mod: GC | Performed by: INTERNAL MEDICINE

## 2021-09-06 PROCEDURE — 250N000011 HC RX IP 250 OP 636: Performed by: STUDENT IN AN ORGANIZED HEALTH CARE EDUCATION/TRAINING PROGRAM

## 2021-09-06 PROCEDURE — 999N000157 HC STATISTIC RCP TIME EA 10 MIN

## 2021-09-06 PROCEDURE — 84100 ASSAY OF PHOSPHORUS: CPT | Performed by: PHYSICIAN ASSISTANT

## 2021-09-06 PROCEDURE — 99233 SBSQ HOSP IP/OBS HIGH 50: CPT | Performed by: PEDIATRICS

## 2021-09-06 PROCEDURE — 83735 ASSAY OF MAGNESIUM: CPT | Performed by: PHYSICIAN ASSISTANT

## 2021-09-06 PROCEDURE — 250N000013 HC RX MED GY IP 250 OP 250 PS 637: Performed by: PHYSICIAN ASSISTANT

## 2021-09-06 PROCEDURE — 84466 ASSAY OF TRANSFERRIN: CPT | Performed by: INTERNAL MEDICINE

## 2021-09-06 PROCEDURE — 84443 ASSAY THYROID STIM HORMONE: CPT | Performed by: PHYSICIAN ASSISTANT

## 2021-09-06 PROCEDURE — 250N000012 HC RX MED GY IP 250 OP 636 PS 637: Performed by: PHYSICIAN ASSISTANT

## 2021-09-06 PROCEDURE — G0103 PSA SCREENING: HCPCS | Performed by: INTERNAL MEDICINE

## 2021-09-06 PROCEDURE — 97530 THERAPEUTIC ACTIVITIES: CPT | Mod: GP

## 2021-09-06 PROCEDURE — 250N000011 HC RX IP 250 OP 636: Performed by: PHYSICIAN ASSISTANT

## 2021-09-06 PROCEDURE — 85027 COMPLETE CBC AUTOMATED: CPT | Performed by: PHYSICIAN ASSISTANT

## 2021-09-06 PROCEDURE — 94660 CPAP INITIATION&MGMT: CPT

## 2021-09-06 PROCEDURE — 250N000013 HC RX MED GY IP 250 OP 250 PS 637: Performed by: STUDENT IN AN ORGANIZED HEALTH CARE EDUCATION/TRAINING PROGRAM

## 2021-09-06 PROCEDURE — 97161 PT EVAL LOW COMPLEX 20 MIN: CPT | Mod: GP

## 2021-09-06 PROCEDURE — 99223 1ST HOSP IP/OBS HIGH 75: CPT | Performed by: INTERNAL MEDICINE

## 2021-09-06 PROCEDURE — 250N000009 HC RX 250: Performed by: PHYSICIAN ASSISTANT

## 2021-09-06 PROCEDURE — 120N000003 HC R&B IMCU UMMC

## 2021-09-06 PROCEDURE — U0005 INFEC AGEN DETEC AMPLI PROBE: HCPCS | Performed by: PHYSICIAN ASSISTANT

## 2021-09-06 PROCEDURE — 80048 BASIC METABOLIC PNL TOTAL CA: CPT | Performed by: PHYSICIAN ASSISTANT

## 2021-09-06 PROCEDURE — 36592 COLLECT BLOOD FROM PICC: CPT | Performed by: PHYSICIAN ASSISTANT

## 2021-09-06 PROCEDURE — 84439 ASSAY OF FREE THYROXINE: CPT | Performed by: PHYSICIAN ASSISTANT

## 2021-09-06 RX ORDER — OMEPRAZOLE 40 MG/1
40 CAPSULE, DELAYED RELEASE ORAL
Status: ON HOLD | COMMUNITY
End: 2021-12-12

## 2021-09-06 RX ORDER — ESCITALOPRAM OXALATE 5 MG/1
5 TABLET ORAL DAILY
Status: ON HOLD | COMMUNITY
End: 2021-12-12

## 2021-09-06 RX ORDER — LEVOTHYROXINE SODIUM 25 UG/1
25 TABLET ORAL DAILY
Status: ON HOLD | COMMUNITY
End: 2021-12-12

## 2021-09-06 RX ORDER — AMLODIPINE BESYLATE 5 MG/1
5 TABLET ORAL DAILY
Status: ON HOLD | COMMUNITY
End: 2021-12-29

## 2021-09-06 RX ORDER — SULFAMETHOXAZOLE/TRIMETHOPRIM 800-160 MG
1 TABLET ORAL
Status: ON HOLD | COMMUNITY
End: 2021-12-12

## 2021-09-06 RX ORDER — ACETAMINOPHEN 325 MG/1
325 TABLET ORAL EVERY 6 HOURS PRN
Status: ON HOLD | COMMUNITY
End: 2021-12-12

## 2021-09-06 RX ORDER — POTASSIUM CHLORIDE 1500 MG/1
20 TABLET, EXTENDED RELEASE ORAL DAILY
Status: ON HOLD | COMMUNITY
End: 2021-12-12

## 2021-09-06 RX ORDER — INSULIN ASPART 100 [IU]/ML
3-11 INJECTION, SOLUTION INTRAVENOUS; SUBCUTANEOUS
Status: ON HOLD | COMMUNITY
End: 2021-12-12

## 2021-09-06 RX ADMIN — INSULIN ASPART 7 UNITS: 100 INJECTION, SOLUTION INTRAVENOUS; SUBCUTANEOUS at 14:02

## 2021-09-06 RX ADMIN — METHYLPREDNISOLONE SODIUM SUCCINATE 125 MG: 125 INJECTION, POWDER, FOR SOLUTION INTRAMUSCULAR; INTRAVENOUS at 19:54

## 2021-09-06 RX ADMIN — METHYLPREDNISOLONE SODIUM SUCCINATE 125 MG: 125 INJECTION, POWDER, FOR SOLUTION INTRAMUSCULAR; INTRAVENOUS at 01:33

## 2021-09-06 RX ADMIN — SULFAMETHOXAZOLE AND TRIMETHOPRIM 3 TABLET: 800; 160 TABLET ORAL at 14:05

## 2021-09-06 RX ADMIN — INSULIN ASPART: 100 INJECTION, SOLUTION INTRAVENOUS; SUBCUTANEOUS at 20:05

## 2021-09-06 RX ADMIN — FLUCONAZOLE 400 MG: 2 INJECTION, SOLUTION INTRAVENOUS at 19:50

## 2021-09-06 RX ADMIN — LEVOTHYROXINE SODIUM 25 MCG: 0.03 TABLET ORAL at 07:57

## 2021-09-06 RX ADMIN — PANTOPRAZOLE SODIUM 40 MG: 40 TABLET, DELAYED RELEASE ORAL at 16:51

## 2021-09-06 RX ADMIN — METHYLPREDNISOLONE SODIUM SUCCINATE 125 MG: 125 INJECTION, POWDER, FOR SOLUTION INTRAMUSCULAR; INTRAVENOUS at 14:05

## 2021-09-06 RX ADMIN — SULFAMETHOXAZOLE AND TRIMETHOPRIM 3 TABLET: 800; 160 TABLET ORAL at 19:57

## 2021-09-06 RX ADMIN — HEPARIN SODIUM 5000 UNITS: 5000 INJECTION, SOLUTION INTRAVENOUS; SUBCUTANEOUS at 07:57

## 2021-09-06 RX ADMIN — DOXYCYCLINE 100 MG: 100 INJECTION, POWDER, LYOPHILIZED, FOR SOLUTION INTRAVENOUS at 07:58

## 2021-09-06 RX ADMIN — CEFEPIME HYDROCHLORIDE 2 G: 2 INJECTION, POWDER, FOR SOLUTION INTRAVENOUS at 21:12

## 2021-09-06 RX ADMIN — METHYLPREDNISOLONE SODIUM SUCCINATE 125 MG: 125 INJECTION, POWDER, FOR SOLUTION INTRAMUSCULAR; INTRAVENOUS at 07:57

## 2021-09-06 RX ADMIN — PANTOPRAZOLE SODIUM 40 MG: 40 TABLET, DELAYED RELEASE ORAL at 07:57

## 2021-09-06 RX ADMIN — INSULIN GLARGINE 5 UNITS: 100 INJECTION, SOLUTION SUBCUTANEOUS at 07:57

## 2021-09-06 RX ADMIN — CEFEPIME HYDROCHLORIDE 2 G: 2 INJECTION, POWDER, FOR SOLUTION INTRAVENOUS at 03:43

## 2021-09-06 RX ADMIN — INSULIN ASPART 6 UNITS: 100 INJECTION, SOLUTION INTRAVENOUS; SUBCUTANEOUS at 09:30

## 2021-09-06 RX ADMIN — SULFAMETHOXAZOLE AND TRIMETHOPRIM 3 TABLET: 800; 160 TABLET ORAL at 07:57

## 2021-09-06 RX ADMIN — ESCITALOPRAM OXALATE 5 MG: 5 TABLET, FILM COATED ORAL at 19:54

## 2021-09-06 RX ADMIN — AMLODIPINE BESYLATE 5 MG: 5 TABLET ORAL at 07:57

## 2021-09-06 RX ADMIN — DOXYCYCLINE 100 MG: 100 INJECTION, POWDER, LYOPHILIZED, FOR SOLUTION INTRAVENOUS at 19:52

## 2021-09-06 RX ADMIN — CEFEPIME HYDROCHLORIDE 2 G: 2 INJECTION, POWDER, FOR SOLUTION INTRAVENOUS at 13:02

## 2021-09-06 RX ADMIN — FLUTICASONE FUROATE 1 PUFF: 100 POWDER RESPIRATORY (INHALATION) at 07:56

## 2021-09-06 RX ADMIN — Medication 3 MG: at 19:57

## 2021-09-06 RX ADMIN — HEPARIN SODIUM 5000 UNITS: 5000 INJECTION, SOLUTION INTRAVENOUS; SUBCUTANEOUS at 21:13

## 2021-09-06 ASSESSMENT — ACTIVITIES OF DAILY LIVING (ADL)
ADLS_ACUITY_SCORE: 19
ADLS_ACUITY_SCORE: 20
ADLS_ACUITY_SCORE: 19
ADLS_ACUITY_SCORE: 20
ADLS_ACUITY_SCORE: 19
ADLS_ACUITY_SCORE: 19

## 2021-09-06 ASSESSMENT — MIFFLIN-ST. JEOR: SCORE: 1470

## 2021-09-06 NOTE — CONSULTS
[facility]   Pulmonary   Consult Note  Edson Thornton MRN: [id number]  1965  Date of Admission:9/5/2021    We were consulted for evaluation of new lung transplant patient.     Assessment & Plan    56-year-old male (BMI 27.6) with history of NSIP/ILD, RA (status post Rituxan infusion, leflunomide), SALTY, HTN transferred from OSH for worsening acute hypoxemic respiratory failure in the setting of progressive ILD necessitating evaluation for lung transplant.    1. Acute hypoxic respiratory failure  2. 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 Rheumatoid arthritis (positive anti-CCP, RF)      Discussion:   56 year old male with PMHx most significant for RA and NSIP/ILD with rapidly increasing oxygen requirements in the setting of progressive hypoxic respiratory failure.  This is patient's third hospitalization within the past month, and the second related to his progressive dyspnea.  His infectious work-up at OSH has been largely negative--including negative fungal serologies, COVID-19 testing, numerous bacterial antigen testings, respiratory cultures, and RVP.  Moreover patient has undergone multiple steroid regimens and was recently on methylprednisolone 125 mg q6h IV prior to [facility] transfer.    Patient continues to require high flow nasal cannula and FiO2 of 0.8 to maintain oxygenation levels in the low 90s.  Given his persistent hypoxemia despite adequate glucocorticoid, antifungal, and antibiotic therap therapies, he wants discussion regarding lung transplantation he has no history of malignancy within the past 5 years and his BMI is below 30 he is also a non-smoker.  PFTs taken in July of this year demonstrate what appears to be a restrictive lung pattern.  If lung transplantation were to proceed, then patient would require extensive cardiac work-up including right heart and left heart catheterizations along with further consideration by the multidisciplinary transplant team.     His  hospitalization will focus on treatment of his acute hypoxemic respiratory failure in addition to evaluating his candidacy for lung transplant.  Patient understands that this is a long process and will need to partake in various procedures for work-up.    Recommendations:    -We will discuss with lung transplant coordinator to begin process of evaluating patient's candidacy   -Continue methylprednisolone 125 mg every 6 hours   -Okay to continue cefepime, doxycycline and fluconazole   -Pulmonary general consult for ILD management    We will continue to follow.    Patient seen & discussed w/  Dr. Woods.     Faisal Caban MD   Pulmonary/Critical Care Fellow   Pager #[phone]           History of Present Illness:   Edson Thornton is a 56 year old male with history of rheumatoid arthritis, NSIP/ILD, SALTY, HTN and HLD who presented to [facility] on 9/5/2021 as a transfer from [facility] with complaints of rapidly progressive dyspnea on exertion beginning in July 2021.  Patient's history is remarkable for diagnosis of ILD in July for which he has progressed from being on room air to requiring 2 to 4 L home O2, and now being on high flow prior to his transfer.  Patient states that his symptoms have not abated since end of July when he was discharged after his first hospitalization with a steroid regimen.  He notes undergoing multiple lab testings and has had a bronchoscopy with BAL to investigate for infection as the main cause of his dyspnea on exertion and shortness of breath.  He denies fever, chills, sore throat, hemoptysis, night sweats, chest pain, lightheadedness, or headache.      Despite extensive work-up for infection and trialing multiple treatments patient notes he continues to be short of breath with low O2 reads despite being on supplemental oxygen    Patient is a non-smoker smoker w/ no significant history. Patient reports rare EtOH use. Patient reports no recreational drug use.           Review of Symptoms:  "  10-point ROS reviewed, & found negative w/ exceptions noted in the HPI.          Past Medical History:     Past Medical History:   Diagnosis Date     Anxiety      Depression      HLD (hyperlipidemia)      HTN (hypertension)      Hypothyroidism      ILD (interstitial lung disease) (H)      SALTY on CPAP      Oxygen dependent     BL 4L since ~6/2021     Rheumatoid arthritis (H)     signs ~5/2020, dx 5/2021     Steroid-induced hyperglycemia      Traction bronchiectasis (H)        Past Surgical History:   Procedure Laterality Date     COLONOSCOPY W/ BIOPSIES AND POLYPECTOMY  07/21/2020     HERNIA REPAIR       right acl       XR ACROMIOCLAVICULAR JOINT BILATERAL              Allergies:     No Known Allergies          Outpatient Medications:     No current facility-administered medications on file prior to encounter.  No current outpatient medications on file prior to encounter.            Family History:     Family History   Problem Relation Age of Onset     Diabetes Type 1 Mother      Heart Disease Mother      Chronic Obstructive Pulmonary Disease Mother      Rheumatoid Arthritis Father      Emphysema Paternal Grandfather                Social History:     Social History     Tobacco Use     Smoking status: Never Smoker     Smokeless tobacco: Never Used   Substance Use Topics     Alcohol use: Never     Drug use: Never             Physical Exam:   BP (!) 141/81 (BP Location: Left arm)   Pulse 84   Temp 98.2  F (36.8  C) (Oral)   Resp 18   Ht 1.626 m (5' 4\")   Wt 72.9 kg (160 lb 11.5 oz)   SpO2 94%   BMI 27.59 kg/m      General: Sitting upright on HFNC, no acute distress  HEENT: Anicteric sclera, EOMI, MMM, OP unobstructed, w/o erythema/discharge; no cervical LAD  CV: RRR, no m/r/g  Lungs: Bibasilar crackles on inspiration, speaking in full sentences on HFNC.  Abd: Soft, NT, ND  Ext: WWP,  No LE edema  Skin: No rashes, cyanosis, or jaundice  Neuro: AAOx3, no focal deficits          Data:   Labs (all laboratory " studies reviewed by me):     Lab Results   Component Value Date    WBC 25.4 (H) 09/06/2021    HGB 10.0 (L) 09/06/2021    HCT 31.2 (L) 09/06/2021     09/06/2021     09/06/2021    POTASSIUM 4.1 09/06/2021    CHLORIDE 108 09/06/2021    CO2 26 09/06/2021    BUN 19 09/06/2021    CR 0.79 09/06/2021     (H) 09/06/2021         Imaging (all imaging studies reviewed by me):    Chest CT w/contrast 8/30/21 at OSH  IMPRESSION:   1.  Features of progressive pulmonary interstitial pneumonia and fibrosis.   2.  Mild increase in size of numerous enlarged, likely reactive mediastinal lymph nodes.   3.  Mildly dilated central pulmonary arteries suggesting an element pulmonary arterial hypertension.       Chest X-Ray 9/5/21  IMPRESSION:   1. Right upper extremity PICC projects near the superior cavoatrial  junction.  2. Patchy perihilar opacities without edema, infection, and/or  Atelectasis.      ECHO and PFTs  Transthoracic Echocardiogram 9/2/21 at OSH  LVEF is 60-65%.    PFTs 7/5/21  FVC: 55%  FEV1: 63%  FEV1/FVC: 115%                                       Pre-Bronch    Post-Bronch                                    Pred  Actual %Pred  Actual %Chng   SPIROMETRY   FVC (L)                          3.85   2.12     55   2.41     13   FEV1 (L)                         2.95   1.87     63   2.09     12   FEV1/FVC (%)                       76     88    115     87     -1   FEF 25% (L/sec)                  6.63   6.00     90   6.99     16   FEF 50% (L/sec)                  4.59   3.37     73   4.21     24   FEF 75% (L/sec)                  0.85   0.92    108   1.84     99   FEF 25-75% (L/sec)               2.59   2.51     96   3.64     44   FEF Max (L/sec)                  8.10   6.26     77   7.18     14   FIVC (L)                                2.08          2.51     20   FIF 50% (L/sec)                  4.82   3.51     72   3.52           FIF Max (L/sec)                         3.53          4.28     21   Expiratory Time  (sec)                   7.01          7.01           Back Extrap Vol (L)                     0.06          0.07     14   Time To FEFmax (sec)                   0.069         0.056    -18     LUNG VOLUMES   SVC (L)                          3.85   2.67     69                 IC (L)                           2.62   2.18     83                 ERV (L)                          1.23   0.50     40                 TGV (L)                          3.19   2.68     83                 RV (Pleth) (L)                   2.11   2.18    102                 TLC (Pleth) (L)                  5.81   4.85     83                 RV/TLC (Pleth) (%)                 36     45    125                   DIFFUSION   DLCOunc (ml/min/mmHg)           26.63  20.29     76                 DLCOcor (ml/min/mmHg)           26.63  20.02     75                 DL/VA (ml/min/mmHg/L)            4.74   4.16     87                 VA (L)                           5.81   4.81     82                 BHT (sec)                              10.46                         IVC (L)                                 2.61                         TLC (SB) (L)                            4.96                           AIRWAYS RESISTANCE   BLOOD GASES   Hgb (gm/dL)                             15.1                           EFFORT:   Adequate effort     SPIROMETRY:   Pseudorestrictive pattern on spirometry   Positive bronchodilator response based on ATS criteria     FLOW VOLUME LOOPS:   Restrictive pattern     LUNG VOLUMES:   Normal total lung capacity     DIFFUSION CAPACITY:   Normal diffusion capacity   Diffusion capacity was corrected for hemoglobin

## 2021-09-06 NOTE — PLAN OF CARE
"/73 (BP Location: Left arm)   Pulse 68   Temp 98.3  F (36.8  C) (Axillary)   Resp 18   Ht 1.626 m (5' 4\")   Wt 72.9 kg (160 lb 11.5 oz)   SpO2 95%   BMI 27.59 kg/m      VSS. Afebrile. Alert and oriented x 4. Pt. has been on Bipap 55-6-% FiO2 overnight. Denies pain. Tolerating diet with no n/v. Repositions self in bed. Need to collect sputum sample. Scrotal redness and redness on cheeks from Bipap present. Bridge of nose reddened but blanchable. Gecko pad applied. Pulmonary and rheumatology consults placed. Continue to monitor.  "

## 2021-09-06 NOTE — PROGRESS NOTES
Plan of Care [id number]  Neuro:  - Aox4   - PERRLA   - Able to follow commands and make needs known   CVC:  - SR  -SBP 130s-150s, history of HTN   - HR in 70s-80s  Resp:  -Switched btwn Hi Michael at 80% 35 LPM and BiPAP at 60%, tolerating both well  - LS diminished at bases   - Tachypneic, increased WOB fairly frequently w activity   GI:  - BS active, last BM this AM per pt   :  - AUO in urinal   Diet/Appetite:  - Tolerating reg diet, appetite is good   Activity:  - Able to move around in bed easily   Skin:  - Scrotal redness   - Some redness on cheeks bilat from BiPAP, no breakdown present   - Scattered bruising   LDA:  - R PIV   - R triple lumen PICC in place  Plan:  - Continue with POC, notify primary team with any changes in pt condition

## 2021-09-06 NOTE — PROGRESS NOTES
Discussed with bedside RN patient's home BP meds, takes amlodipine 5 mg daily, and enalipril / hydrochlorothiazide 10/25 mg daily.     Okay to continue amlodipine for now, will hold from administering additional antihypertensives for now. Discussed contingency with RN that if SBP > 160 will re-address.     Yanick Matias D.O.  Internal Medicine, Hospitalist  [facility]  Pager: [phone]

## 2021-09-06 NOTE — PROGRESS NOTES
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      Today:  Feeling ok today.  No changes from yesterday.  Awaiting pulmonary consult for transplant evaluation.      Today Updates  1. Rheum consult, continue current steroid treatment  2. Wean O2 as tolerated      Acute on chronic hypoxic respiratory failure (BL 4L)  ILD  Traction bronchiectasis  Sx started ~6/2021 and so far are attributed to RA-associated ILD.  Follows with Dr Butler and Delvin Blackwood CNP of [facility]. Has been on intermittent BiPAP vs HiFlow NC and notes needing BiPAP overnight last night until 12pm.  PFTs last year \"pseudorestrictive pattern\". CXR 9/3/21 report only \"Heart and mediastinal contours appear stable. Low lung volumes. Coarse bilateral interstitial/reticular opacities, without significant change. No large effusion or pneumothorax. Stable right clavicle deformity. No appreciable acute bone findings.\" CT non-con at OSH: \"1.  Features of progressive pulmonary interstitial pneumonia and fibrosis. 2.  Mild increase in size of numerous enlarged, likely reactive mediastinal lymph nodes. 3.  Mildly dilated central pulmonary arteries suggesting an element pulmonary arterial hypertension.\"  - consult to pulmonary   - RT consult for BiPAP  - maintain O2 sats >90%  - Abx: Bactrim DS 3 tabs q8h, Cefepime 2g q8h, doxycycline 100mg BID  - Diflucan 400mg IV daily  - Solumedrol 125mg q6h, and PPI  - fluticasone inhaler daily  - Duonebs 4 x daily and prn   - consider lasix as needed (received several doses at OSH)  - DVT  ptx heparin  - FEN: reg diet as able  - sputum culture as able     Infectious " workup:   Infection workup so far is neg:   Viral: 8/30 parainfluenza, flu, rsv, COVID-19 neg. 9/1 hepatis A, B, C neg.    - 7/29 Mycoplasma IgG, neg IgM, c/w past infection. TB quant 5/11 neg.  Histo/blasto/ coccidioides/ aspergiluus 9/2 Neg.  - Pneumocystitis testing is pending (awaiting sputum collection).   -Urine legionella and S. pneumo Ag neg  - S/p bronchoscopy 7/28 with cultures only showing small amount of candida (on fluconazole)  - Procalcitonin is only mildly elevated at 0.21-> repeat procal is 0.08.      Transplant workup  Transfer was specifically for transplant workup  - consult to transplant pulm      RA  Initial symptoms ~5/2020 with joint pains and official dx 5/2021. Follows with Dr. Yoo in [place]. 5/11 labs show CCP Ab >340, Rheum factor quant 100. Hypersensitivy panel 7/19 neg.  7/19 labs: RICHELLE screen, ANCA screen, myeloperodidase Ab, Proteinase 3 Ab neg. Immunoglobins 7/19 normal.   - consult to rheumatology  -1 dose of rituximab 8/6/2021, did not complete dosing regimen due to insurance coverage issues as an outpatient, rituximab 2nd of 2 doses 9/4  - steroids as per above      Steroid-induced hyperglycemia  Glucoses  169 and 212 at OSH.   - continue lantus 5 units   - moderate ISS and hypoglycemia protocol.  - continue CHO ratio 1:15 today  - A1C pending     Leukocytosis  WBC 27 (9/4)--> 26 (9/5). Occurs in the setting of high dose steroids.   - Abx as per above  - trend CBC  - follow up pulm and rheum recs     SALTY on home CPAP [id number]  - continue CPAP vs BiPAP     HTN  Admission bp mildly elevated in the setting of acute illness, hypoxia and high-dose steroids  - continue norvasc 5mg daily  HLD - 10/2020 lipid panel T-cholesterol 148, trig 151, HDL 31. - consider statin  Anxiety/Depression - continue PTA lexapro   Hypothyroidism - TSH 6/28/21 0.76- continue synthroid 25mcg  GERD - cont PPI         Diet: Regular Diet Adult    DVT Prophylaxis: Heparin SQ  Watson Catheter: Not  present    Code Status: Full Code           Disposition Plan   Expected discharge:  In > 3 days recommended to transitional care unit once O2 use less than 4 liters/minute.  Entered: Vincent Rousseau MD 09/06/2021, 5:09 PM       The patient's care was discussed with the Patient and Pulmonary Team.    Vincent Rousseau MD  Pager [phone]  Hospitalist Service, [address]  Please see sign in/sign out for up to date coverage information  ______________________________________________________________________      Interval History   Nursing notes reviewed.  Patient seen or discussed with bedside nurse.    Feeling better today with easier breathing.  No pain or nausea.  Appetite ok.      Data reviewed today: I reviewed all medications, new labs and imaging results over the last 24 hours.    Physical Exam     Vital Signs: Temp: 98.4  F (36.9  C) Temp src: Oral BP: (!) 141/71 Pulse: 84   Resp: 18 SpO2: 95 % O2 Device: High Flow Nasal Cannula (HFNC) Oxygen Delivery: 30 LPM  Weight: 160 lbs 11.45 oz  I/O last 3 completed shifts:  In: 1597 [P.O.:1197; I.V.:400]  Out: 1850 [Urine:1850]      Constitutional: alert and mild respiratory distress   Cardiovascular: RRR. No murmurs  Respiratory: Fair air excursion. Lungs course with crackles throughout, no wheezes  Abdomen: Abdomen soft, non-tender. BS normal. No masses, organomegaly  Psychiatric: mentation appears normal and affect normal/bright    Medications     - MEDICATION INSTRUCTIONS -       - MEDICATION INSTRUCTIONS -         amLODIPine  5 mg Oral Daily     ceFEPIme (MAXIPIME) IV  2 g Intravenous Q8H     doxycycline (VIBRAMYCIN) IV  100 mg Intravenous Q12H     escitalopram  5 mg Oral QPM     fluconazole  400 mg Intravenous Q24H     fluticasone  1 puff Inhalation Daily     heparin ANTICOAGULANT  5,000 Units Subcutaneous Q12H     insulin aspart   Subcutaneous TID AC     insulin aspart  1-7 Units Subcutaneous TID AC     insulin  aspart  1-5 Units Subcutaneous At Bedtime     insulin glargine  5 Units Subcutaneous QAM AC     levothyroxine  25 mcg Oral QAM AC     melatonin  3 mg Oral At Bedtime     methylPREDNISolone  125 mg Intravenous Q6H     pantoprazole  40 mg Oral BID AC     sodium chloride (PF)  3 mL Intracatheter Q8H     sulfamethoxazole-trimethoprim  3 tablet Oral TID

## 2021-09-06 NOTE — PLAN OF CARE
Neuro: A&Ox4.   Cardiac: SR. With one run of VTACH when patient up to commode (20 beats) at 1725. Otherwise VSS.   Respiratory: Hi Flow 90%FIO2 35L sating >90%, no cough.  GI/: Adequate urine output. BM X1  Diet/appetite: Tolerating reg diet. Eating well.  Activity:  Assist of 1 up to commode x1, patient had to use oxymask 15L on top of Hi Flow nasal canula, desat to 88%, recovery time approx. 2 min.   Pain: At acceptable level on current regimen.   Skin: No new deficits noted. Scrotal redness, awaiting WOC consult  LDA's: R PICC, R PIV    Plan: Continue with POC. Notify primary team with changes.

## 2021-09-06 NOTE — PHARMACY-ADMISSION MEDICATION HISTORY
Admission Medication History Completed by Pharmacy    See [facility] Admission Navigator for allergy information, preferred outpatient pharmacy, prior to admission medications and immunization status.     Medication History Sources:     Per patient and care everywhere ([facility])     Reviewed discharge summary and MAR from [facility], [place], [place] (He was admitted there from 8/30/2021-9/5/2021)    Changes made to PTA medication list (reason):    Added:   o Acetaminophen 325mg tab  o Amlodipine 5mg tab  o Escitalopram 5mg tab  o Novolog Pen 100 unit/ml  o Levothyroxine 25mcg tab  o Omeprazole 40mg cap  o Potassium Chloride 20 mEq tab   o Sulfamethoxazole- Trimethoprim 800-160 mg tab    Deleted: None    Changed: None    Additional Information:    Patient was alert and easily answered questions. He was unsure of what medication he took at home. Pt was able to recall most of his medications with prompting and they were consistent with MAR from [facility] in [place], [place] seen in care everywhere.    During hospital stay at [facility] he received the following anti-infectives:  o Fluconazole 400mg IV q24h started 8/30/2021  o Cefepime 2g IV q8h started 8/30/2021  o Metronidazole 500mg IV q8h started 8/30/2021  o Bactrim DS (800/160mg) tab - take 3 tablets three times a day started 9/2/2021  o Doxycycline 100mg IV q12h started 8/30/2021  o Vancomycin IV given one day 8/30/2021    Patient received rituximab 1000mg IV 9/2/2021 @ 1610    At OSH he was receiving: Duoneb four times daily and Pulmicort neb two times daily instead of Breo inhaler    Prior to Admission medications    Medication Sig Last Dose Taking? Auth Provider   acetaminophen (TYLENOL) 325 MG tablet Take 325 mg by mouth every 6 hours as needed for mild pain 8/29/2021 Yes Unknown, Entered By History   amLODIPine (NORVASC) 5 MG tablet Take 5 mg by mouth daily 9/5/2021 at 0813 Yes Unknown, Entered By History    escitalopram (LEXAPRO) 5 MG tablet Take 5 mg by mouth daily 9/4/2021 at 2006 Yes Unknown, Entered By History   fluticasone-vilanterol (BREO ELLIPTA) 200-25 MCG/INH inhaler Inhale 1 puff into the lungs daily 8/29/2021 Yes Unknown, Entered By History   insulin aspart (NOVOLOG FLEXPEN) 100 UNIT/ML pen Inject 3-11 Units Subcutaneous With meals and at bedtime. 8/29/2021 Yes Unknown, Entered By History   levothyroxine (SYNTHROID/LEVOTHROID) 25 MCG tablet Take 25 mcg by mouth daily 9/5/2021 at 0812 Yes Unknown, Entered By History   omeprazole (PRILOSEC) 40 MG DR capsule Take 40 mg by mouth 2 times daily (before meals) 9/5/2021 at 0811 Yes Unknown, Entered By History   potassium chloride ER (KLOR-CON M) 20 MEQ CR tablet Take 20 mEq by mouth daily 8/29/2021 Yes Unknown, Entered By History   sulfamethoxazole-trimethoprim (BACTRIM DS) 800-160 MG tablet Take 1 tablet by mouth Every Mon, Wed, Fri Morning  8/30/2021 Yes Unknown, Entered By History       Date completed: 09/06/21    Medication history completed by: Jagjit Villalobos, PD2 Intern.     I have reviewed, edited and agree with documentation of medication history.  Leticia Ambriz Pharm.D., [facility]  Pager [phone]

## 2021-09-06 NOTE — CONSULTS
RHEUMATOLOGY CONSULT NOTE - FELLOW    Edson Thornton MRN# [id number]   Age: 56 year old YOB: 1965     Date of Admission:  9/5/2021  Reason for consult: rheumatoid arthritis, recent diagnosis of severe ILD/rheumatic lung disease    Assessment and Plan:   Discussion:     Mr. Thornton is a 55 yo with recent diagnosis of seropositive rheumatoid arthritis (+CCP, +RF) with associate ILD transferred from OSH for acute on chronic hypoxic respiratory failure.     The patient's recent findings of NSIP in the setting of seropositive RA are consistent with RA related ILD. The patient received rituximab on 8/6 with a second dose delayed until 9/3. The patient has improved symptomatically since his methylprednisolone was increased to 125 mg q6h on 9/4, and his oxygen needs appear stable to improved. He remains on antibiotics as well as fluconazole (as BAL from 7/28 showed candida).     Considering the recent second dose of the patient's rituximab, more time is likely needed to evaluate it's full effect. Obtaining a CD19 level might provide further information about the patient's response thus far to the rituximab, though the second dose may be having some immunologic effect that is not well represented by this value. We recommend continuing current methylprednisolone 125 mg q6h at this time. Pending clinical course, we will consider additional immunosupression.     The patient had a negative RICHELLE, ANCA, MPO, PR3 (7/19) and his clinical picture is fitting with ILD in the setting of RA. While he's without weakness to suggest myositis, recommend obtaining CK, aldolase and Irene-1 levels. We'd also recommend sending an SINCERE.    Problem list:  Sero-positive RA (+CCP, +RF)  Interstitial lung disease  Acute on chronic hypoxic respiratory failure  Duodenal abnormality  Hypothyroidism   Weight loss    Recommendations:  -- obtain aldolase, CK  -- obtain Irene-1, SINCERE panel (SSA, SSB, Bowen, RNP), CD19  -- continue methylprednisolone  "125 mg q6h    The patient was seen and staffed with Dr. Gume Camarillo.     Rhoda Hunter MD  Rheumatology fellow    Staff addendum  I performed the history and physical examination of the patient and discussed the management with the fellow. I reviewed the available lab and imaging studies. I reviewed the fellow's note and agree with the documented findings and plan of care.    Gume Camarillo MD  Rheumatology           Chief Complaint:   Hypoxic respiratory failure         History of Present Illness:     Mr. Thornton is a 55 yo male with hx of SALTY and RA (+CCP, +RF) with ILD suspected 2/2 RA, transferred here from outside hospital on 9/5 with hypoxic respiratory failure.    Mr. Thornton noticed he started having pain and swelling in his knuckles of both hands some time ago, and subsequently developed knee pain. He was seen in clinic in 4/2020 with finger pain and knee pain. H was treated with nsaids for tenosynovitis and the knee pain. RF factor qualitative testing and RICHELLE were negative, CRP was elevated. He notes he later had steroid injections that helped his pain for awhile. However the pain came back so he presented again for evaluation at which time labs were repeated and he was found to have an elevated  CCP >340,  (5/11). He was started on leflunomide and prednisone (10 mg). His joint pain improved however around June he began having shortness of breath with associated cough occasionally productive of sputum with small amounts of blood, and also noted unintentional weight loss. He had a CT with contrast that reported features of NSIP/fibrosis, mediastinal and bilateral hilar LAD, several small calcified granulomas. He had PFTs with \"pseudo-restrictive pattern\" and some bronchodilator response. He was seen by pulmonary medicine and it was felt ILD likely was related to RA. His prednisone was increased to 40 mg daily with a taper. He reports his breathing continued to worsen. He was admitted in July for " infectious workup and high dose steroids. He had a BAL showing candida and was started on fluconazole. During that admission he was seen by GI due to a concern for possible duodenal ulcer on CT done for abdominal pain. He had a water soluble upper GI xray that showed a duodenal diverticulum and was cleared for high dose steroids by GI but supposed to have further outpatient evaluation (with endoscopy). Mr. Thornton received 1000 mg of rituximab and notes his symptoms improved with high dose steroids, and that he was discharged with oxygen (1-3L with activity).  He was discharged on 40 mg daily of prednisone and reports his shortness of breath began worsening again with the lower doses. He continued to have ongoing cough with intermittent spots of blood. Because of his shortness of breath and fatigue, he wasn't eating much and thinks this might be why he lost ~20 lbs in the past couple months. The patient denies significant weakness of his muscles, fever, rash, color change of his fingers, abdominal pain or difficulty swallowing. He has numbness in his 4th and 5th fingers but otherwise denies tingling or numbness in his extremities. Mr. Thornton's father was diagnosed with rheumatoid arthritis at age 68.      Mr. Apgar's second dose of rituximab was delayed due to insurance issues. The patient states he was supposed to get his endoscopy but was instead referred for admission given his respiratory status. He was admitted on  at which time CT chest read as worsening ILD with traction bronchiectasis. ID work-up was unrevealing. Fluconazole was continued, treated empirically with cefepime, doxy, metronidazole. He was also started on methylprednisolone 60 mg q6h and received a second dose of rituximab. The patient had increasing oxygen requirements necessitating escalation to HFNC and BIPAP. He was started on methylprednisolone 125 mg q6h on 9/3 and notes that his breathing has since improved.              Past Medical  History:     Past Medical History:   Diagnosis Date    Anxiety     Depression     HLD (hyperlipidemia)     HTN (hypertension)     Hypothyroidism     ILD (interstitial lung disease) (H)     SALTY on CPAP     Oxygen dependent     BL 4L since ~6/2021    Rheumatoid arthritis (H)     signs ~5/2020, dx 5/2021    Steroid-induced hyperglycemia     Traction bronchiectasis (H)              Past Surgical History:     Past Surgical History:   Procedure Laterality Date    COLONOSCOPY W/ BIOPSIES AND POLYPECTOMY  07/21/2020    HERNIA REPAIR      right acl      XR ACROMIOCLAVICULAR JOINT BILATERAL              Social History:     Social History     Socioeconomic History    Marital status: Single     Spouse name: Not on file    Number of children: Not on file    Years of education: Not on file    Highest education level: Not on file   Occupational History    Not on file   Tobacco Use    Smoking status: Never Smoker    Smokeless tobacco: Never Used   Substance and Sexual Activity    Alcohol use: Never    Drug use: Never    Sexual activity: Not on file   Other Topics Concern    Parent/sibling w/ CABG, MI or angioplasty before 65F 55M? Not Asked   Social History Narrative    Not on file     Social Determinants of Health     Financial Resource Strain:     Difficulty of Paying Living Expenses:    Food Insecurity:     Worried About Running Out of Food in the Last Year:     Ran Out of Food in the Last Year:    Transportation Needs:     Lack of Transportation (Medical):     Lack of Transportation (Non-Medical):    Physical Activity:     Days of Exercise per Week:     Minutes of Exercise per Session:    Stress:     Feeling of Stress :    Social Connections:     Frequency of Communication with Friends and Family:     Frequency of Social Gatherings with Friends and Family:     Attends Baptism Services:     Active Member of Clubs or Organizations:     Attends Club or Organization Meetings:     Marital Status:    Intimate Partner Violence:      Fear of Current or Ex-Partner:     Emotionally Abused:     Physically Abused:     Sexually Abused:              Family History:     Family History   Problem Relation Age of Onset    Diabetes Type 1 Mother     Heart Disease Mother     Chronic Obstructive Pulmonary Disease Mother     Rheumatoid Arthritis Father     Emphysema Paternal Grandfather              Immunizations:     There is no immunization history on file for this patient.          Allergies:   No Known Allergies          Medications:     No medications prior to admission.       Current Facility-Administered Medications   Medication    acetaminophen (TYLENOL) tablet 325-650 mg    [START ON 9/6/2021] amLODIPine (NORVASC) tablet 5 mg    benzocaine 20% (HURRICAINE/TOPEX) 20 % spray 0.5-1 mL    bisacodyl (DULCOLAX) Suppository 10 mg    carboxymethylcellulose PF (REFRESH PLUS) 0.5 % ophthalmic solution 1 drop    ceFEPIme (MAXIPIME) 2 g vial to attach to  ml bag for ADULTS or 50 ml bag for PEDS    glucose gel 15-30 g    Or    dextrose 50 % injection 25-50 mL    Or    glucagon injection 1 mg    doxycycline (VIBRAMYCIN) 100 mg vial to attach to  mL bag    escitalopram (LEXAPRO) tablet 5 mg    fluconazole (DIFLUCAN) intermittent infusion 400 mg in NaCl    [START ON 9/6/2021] fluticasone (ARNUITY ELLIPTA) 100 MCG/ACT inhaler 1 puff    heparin ANTICOAGULANT injection 5,000 Units    insulin aspart (NovoLOG) injection (RAPID ACTING)    insulin aspart (NovoLOG) injection (RAPID ACTING)    insulin aspart (NovoLOG) injection (RAPID ACTING)    [START ON 9/6/2021] insulin glargine (LANTUS PEN) injection 5 Units    ipratropium - albuterol 0.5 mg/2.5 mg/3 mL (DUONEB) neb solution 3 mL    Or    ipratropium - albuterol 0.5 mg/2.5 mg/3 mL (DUONEB) neb solution 3 mL    [START ON 9/6/2021] levothyroxine (SYNTHROID/LEVOTHROID) tablet 25 mcg    lidocaine (LMX4) cream    lidocaine 1 % 0.1-1 mL    melatonin tablet 1 mg    melatonin tablet 3 mg    methylPREDNISolone sodium  "succinate (solu-MEDROL) injection 125 mg    No lozenges or gum should be given while patient on BIPAP/AVAPS/AVAPS AE    ondansetron (ZOFRAN-ODT) ODT tab 4 mg    Or    ondansetron (ZOFRAN) injection 4 mg    pantoprazole (PROTONIX) EC tablet 40 mg    Patient may continue current oral medications    polyethylene glycol (MIRALAX) Packet 17 g    sodium chloride (PF) 0.9% PF flush 3 mL    sodium chloride (PF) 0.9% PF flush 3 mL    sulfamethoxazole-trimethoprim (BACTRIM DS) 800-160 MG per tablet 3 tablet            Review of Systems:   Complete 14 point ROS completed and negative unless mentioned in HPI.          Physical Exam:   BP (!) 141/77 (BP Location: Left arm)   Pulse 84   Temp 97  F (36.1  C) (Axillary)   Resp 29   Ht 1.626 m (5' 4\")   Wt 73.3 kg (161 lb 8 oz)   SpO2 95%   BMI 27.72 kg/m      General: appears well, lying in bed, NAD  HEENT: Sclera non-injected. Oropharynx without ulcers or lesions.  CV: Regular rate and rhythm  Lung: Breath sounds present bilaterally, no wheezes or rales auscultated  Abdomen: Soft, non-distended  Neuro: Awake, alert, CN grossly intact. Strength 5/5 shoulder abduction, elbow flexion,hip flexion bilaterally.   Skin/Hair: nail clubbing present, splinter hemorrhages bilateral hands  Msk:   Shoulders: No tenderness to palpation along glenohumeral joint . No effusion, warmth. Forward flexion and abduction to 180 degrees  Elbows: with full extension and extension, without effusion, erythema or increased warmth  Wrists: with full extension and extension, without effusion, erythema or increased warmth  Hands: MCPs, PIPs, DIPs without effusion, erythema or increased warmth. 5th finger PIPs bilaterally with reducible flexor deformity  Hips: no pain with flexion, internal or external rotation  L knee: No effusion, increased warmth or erythema. Flexion >120 degrees, full extension  R knee: No effusion, increased warmth or erythema. Flexion >120 degrees, full extension  Bilateral ankles: " inversion/eversion, plantar and dorsiflexion intact. No joint effusion.  Feet: No effusion of MTPs, PIPs, DIPs noted            Data:   Labs and imaging reviewed.     Labs here:  BMP WNL except elevated mg (2.5), WBC 25, hgb 10, plt wnl, T4 low at 0.73, TSH low at 0.19    CXR: diffuse interstitial infiltrates with patchy perihilar opacities    Outside labs:  High methemoglobin  Hep c/hep B negative  RICHELLE, ANCA, MPO, PR3 negative (7/19)  CCP >340,  (5/11) RF qualitative negative in 4/2020  TSH WNL (6/28)    BAL 7/28 56 nucleated cells, 37% neutrophils    6/28/21 CT chest w/contrast     Weight loss, unintentional    abnormal cxr, cough, wt loss          COMPARISON: PA and lateral views the chest 6/28/2021        TECHNIQUE: Axial imaging of the chest was performed following the uneventful intravenous administration of  Omnipaque 350 (see medical record for dose in route of administration). Standard 2-D coronal and sagittal reformats were performed and reviewed        FINDINGS:    Normal appearance of the visualized thyroid gland.         Mildly enlarged mediastinal and bilateral hilar lymph nodes including but not necessarily limited to the following:    *   18 x 13 mm right paratracheal image #[id number]    *  19 x 11 mm AP window image #[id number]    *  21 x 12 mm right tracheobronchial image #[id number]    *  18 x 14 mm right hilar image #[id number]    *  16 x 11 mm left hilar image #[id number]    *  18 x 14 mm subcarinal image #[id number]        Normal heart size. Coronary arterial atherosclerotic calcifications. No pericardial abnormalities.          Normal caliber thoracic aorta.  Normal caliber central pulmonary arteries without large central or proximal segmental pulmonary arterial filling defects.        Small hiatal hernia.        No endotracheal lesions. Areas of bilateral upper and lower lobe confluent peribronchovascular groundglass opacities with intrinsic bronchiectasis. Sparse honeycombing anteriorly within the upper lobes. There  are several small calcified granulomas. No pleural effusion or pneumothorax.        No acute or destructive bony changes.        No acute or concerning findings in the visualized upper abdomen. Heterogeneous hepatic parenchymal attenuation likely due to timing of intravenous contrast bolus.        IMPRESSION:    1.  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    2.  Mediastinal and bilateral hilar lymphadenopathy is likely inflammatory/reactive secondary to lung disease.    3.  Healed pulmonary granulomatous disease.    4.  Findings discussed with RASHAAD Maxwell, via telephone at 2:26 PM on 6/28/2021.          CT chest w/o contrast 8/30/21    INDICATION:Interstitial lung disease       COMPARISON: 7/28/2021     TECHNIQUE: Axial CT imaging of the chest was performed without the use of intravenous contrast media.     FINDINGS:   Normal appearance of the visualized thyroid gland.     Mild increased size of multiple mildly enlarged, reactive/inflammatory mediastinal lymph nodes.       Normal heart size.       Normal caliber thoracic aorta. Mildly dilated central pulmonary arteries measuring up to 32 mm in diameter suggesting an element of pulmonary arterial hypertension.     Small hiatal hernia.     No endotracheal lesions. Progressive diffuse bilateral groundglass and reticular interstitial pulmonary opacities. Areas of traction bronchiectasis are again noted along with some regions of evolving honeycomb-like subpleural pulmonary parenchymal architecture within the upper lobes (most pronounced medially).  Small calcified pulmonary granulomas.  No pleural effusion or pneumothorax.     Arthritic skeletal features.     No acute findings within the upper abdomen.     IMPRESSION:   1.  Features of progressive pulmonary interstitial pneumonia and fibrosis.   2.  Mild increase in size of numerous enlarged, likely reactive mediastinal lymph nodes.   3.  Mildly dilated central pulmonary  arteries suggesting an element pulmonary arterial hypertension.         Rhoda Hunter MD  Rheumatology fellow

## 2021-09-07 ENCOUNTER — APPOINTMENT (OUTPATIENT)
Dept: GENERAL RADIOLOGY | Facility: CLINIC | Age: 56
End: 2021-09-07
Attending: INTERNAL MEDICINE
Payer: COMMERCIAL

## 2021-09-07 ENCOUNTER — APPOINTMENT (OUTPATIENT)
Dept: CARDIOLOGY | Facility: CLINIC | Age: 56
End: 2021-09-07
Attending: INTERNAL MEDICINE
Payer: COMMERCIAL

## 2021-09-07 ENCOUNTER — APPOINTMENT (OUTPATIENT)
Dept: NUCLEAR MEDICINE | Facility: CLINIC | Age: 56
End: 2021-09-07
Attending: INTERNAL MEDICINE
Payer: COMMERCIAL

## 2021-09-07 ENCOUNTER — APPOINTMENT (OUTPATIENT)
Dept: CT IMAGING | Facility: CLINIC | Age: 56
End: 2021-09-07
Attending: STUDENT IN AN ORGANIZED HEALTH CARE EDUCATION/TRAINING PROGRAM
Payer: COMMERCIAL

## 2021-09-07 ENCOUNTER — REFERRAL (OUTPATIENT)
Dept: TRANSPLANT | Facility: CLINIC | Age: 56
End: 2021-09-07

## 2021-09-07 DIAGNOSIS — J84.9 ILD (INTERSTITIAL LUNG DISEASE) (H): Primary | ICD-10-CM

## 2021-09-07 LAB
ABO/RH(D): NORMAL
ALBUMIN SERPL-MCNC: 2.4 G/DL (ref 3.4–5)
ALBUMIN UR-MCNC: 30 MG/DL
ALP SERPL-CCNC: 67 U/L (ref 40–150)
ALT SERPL W P-5'-P-CCNC: 39 U/L (ref 0–70)
AMYLASE SERPL-CCNC: 32 U/L (ref 30–110)
ANION GAP SERPL CALCULATED.3IONS-SCNC: 6 MMOL/L (ref 3–14)
ANION GAP SERPL CALCULATED.3IONS-SCNC: 7 MMOL/L (ref 3–14)
ANTIBODY SCREEN: NEGATIVE
APPEARANCE UR: CLEAR
APTT PPP: 26 SECONDS (ref 22–38)
AST SERPL W P-5'-P-CCNC: 30 U/L (ref 0–45)
ATRIAL RATE - MUSE: 85 BPM
BASOPHILS # BLD AUTO: 0.1 10E3/UL (ref 0–0.2)
BASOPHILS NFR BLD AUTO: 0 %
BILIRUB SERPL-MCNC: 0.2 MG/DL (ref 0.2–1.3)
BILIRUB UR QL STRIP: NEGATIVE
BUN SERPL-MCNC: 22 MG/DL (ref 7–30)
BUN SERPL-MCNC: 23 MG/DL (ref 7–30)
CALCIUM SERPL-MCNC: 8.8 MG/DL (ref 8.5–10.1)
CALCIUM SERPL-MCNC: 9.1 MG/DL (ref 8.5–10.1)
CHLORIDE BLD-SCNC: 107 MMOL/L (ref 94–109)
CHLORIDE BLD-SCNC: 108 MMOL/L (ref 94–109)
CHOLEST SERPL-MCNC: 214 MG/DL
CK SERPL-CCNC: 41 U/L (ref 30–300)
CO2 SERPL-SCNC: 24 MMOL/L (ref 20–32)
CO2 SERPL-SCNC: 25 MMOL/L (ref 20–32)
COLOR UR AUTO: ABNORMAL
CREAT SERPL-MCNC: 0.69 MG/DL (ref 0.66–1.25)
CREAT SERPL-MCNC: 0.73 MG/DL (ref 0.66–1.25)
CRP SERPL-MCNC: <2.9 MG/L (ref 0–8)
DEPRECATED CALCIDIOL+CALCIFEROL SERPL-MC: 15 UG/L (ref 20–75)
DIASTOLIC BLOOD PRESSURE - MUSE: NORMAL MMHG
EOSINOPHIL # BLD AUTO: 0 10E3/UL (ref 0–0.7)
EOSINOPHIL NFR BLD AUTO: 0 %
ERYTHROCYTE [DISTWIDTH] IN BLOOD BY AUTOMATED COUNT: 15.9 % (ref 10–15)
ERYTHROCYTE [DISTWIDTH] IN BLOOD BY AUTOMATED COUNT: 16 % (ref 10–15)
GFR SERPL CREATININE-BSD FRML MDRD: >90 ML/MIN/1.73M2
GFR SERPL CREATININE-BSD FRML MDRD: >90 ML/MIN/1.73M2
GLUCOSE BLD-MCNC: 151 MG/DL (ref 70–99)
GLUCOSE BLD-MCNC: 158 MG/DL (ref 70–99)
GLUCOSE BLDC GLUCOMTR-MCNC: 140 MG/DL (ref 70–99)
GLUCOSE BLDC GLUCOMTR-MCNC: 150 MG/DL (ref 70–99)
GLUCOSE BLDC GLUCOMTR-MCNC: 170 MG/DL (ref 70–99)
GLUCOSE BLDC GLUCOMTR-MCNC: 189 MG/DL (ref 70–99)
GLUCOSE UR STRIP-MCNC: NEGATIVE MG/DL
HAV IGG SER QL IA: NONREACTIVE
HBA1C MFR BLD: 6.6 % (ref 0–5.6)
HBV CORE AB SERPL QL IA: NONREACTIVE
HBV SURFACE AB SERPL IA-ACNC: 0.51 M[IU]/ML
HBV SURFACE AG SERPL QL IA: NONREACTIVE
HCT VFR BLD AUTO: 32.3 % (ref 40–53)
HCT VFR BLD AUTO: 33.6 % (ref 40–53)
HCV AB SERPL QL IA: NONREACTIVE
HDLC SERPL-MCNC: 51 MG/DL
HGB BLD-MCNC: 10.5 G/DL (ref 13.3–17.7)
HGB BLD-MCNC: 10.9 G/DL (ref 13.3–17.7)
HGB UR QL STRIP: NEGATIVE
HIV 1+2 AB+HIV1 P24 AG SERPL QL IA: NONREACTIVE
HYALINE CASTS: 3 /LPF
IMM GRANULOCYTES # BLD: 0.9 10E3/UL
IMM GRANULOCYTES NFR BLD: 3 %
INR PPP: 1.16 (ref 0.85–1.15)
INTERPRETATION ECG - MUSE: NORMAL
KETONES UR STRIP-MCNC: NEGATIVE MG/DL
LACTATE SERPL-SCNC: 1.1 MMOL/L (ref 0.7–2)
LDLC SERPL CALC-MCNC: 130 MG/DL
LEUKOCYTE ESTERASE UR QL STRIP: NEGATIVE
LVEF ECHO: NORMAL
LYMPHOCYTES # BLD AUTO: 0.5 10E3/UL (ref 0.8–5.3)
LYMPHOCYTES NFR BLD AUTO: 2 %
MAGNESIUM SERPL-MCNC: 2.2 MG/DL (ref 1.6–2.3)
MCH RBC QN AUTO: 28 PG (ref 26.5–33)
MCH RBC QN AUTO: 28.6 PG (ref 26.5–33)
MCHC RBC AUTO-ENTMCNC: 32.4 G/DL (ref 31.5–36.5)
MCHC RBC AUTO-ENTMCNC: 32.5 G/DL (ref 31.5–36.5)
MCV RBC AUTO: 86 FL (ref 78–100)
MCV RBC AUTO: 88 FL (ref 78–100)
MONOCYTES # BLD AUTO: 0.8 10E3/UL (ref 0–1.3)
MONOCYTES NFR BLD AUTO: 3 %
MUCOUS THREADS #/AREA URNS LPF: PRESENT /LPF
NEUTROPHILS # BLD AUTO: 25.7 10E3/UL (ref 1.6–8.3)
NEUTROPHILS NFR BLD AUTO: 92 %
NITRATE UR QL: NEGATIVE
NRBC # BLD AUTO: 0 10E3/UL
NRBC BLD AUTO-RTO: 0 /100
NT-PROBNP SERPL-MCNC: 890 PG/ML (ref 0–900)
P AXIS - MUSE: 52 DEGREES
PH UR STRIP: 6 [PH] (ref 5–7)
PHOSPHATE SERPL-MCNC: 3.2 MG/DL (ref 2.5–4.5)
PLATELET # BLD AUTO: 153 10E3/UL (ref 150–450)
PLATELET # BLD AUTO: 153 10E3/UL (ref 150–450)
POTASSIUM BLD-SCNC: 3.9 MMOL/L (ref 3.4–5.3)
POTASSIUM BLD-SCNC: 3.9 MMOL/L (ref 3.4–5.3)
PR INTERVAL - MUSE: 138 MS
PROT SERPL-MCNC: 5.6 G/DL (ref 6.8–8.8)
PSA SERPL-MCNC: 0.56 UG/L (ref 0–4)
QRS DURATION - MUSE: 80 MS
QT - MUSE: 368 MS
QTC - MUSE: 437 MS
R AXIS - MUSE: 33 DEGREES
RBC # BLD AUTO: 3.75 10E6/UL (ref 4.4–5.9)
RBC # BLD AUTO: 3.81 10E6/UL (ref 4.4–5.9)
RBC URINE: <1 /HPF
SODIUM SERPL-SCNC: 138 MMOL/L (ref 133–144)
SODIUM SERPL-SCNC: 139 MMOL/L (ref 133–144)
SP GR UR STRIP: 1.03 (ref 1–1.03)
SPECIMEN EXPIRATION DATE: NORMAL
SQUAMOUS EPITHELIAL: <1 /HPF
SYSTOLIC BLOOD PRESSURE - MUSE: NORMAL MMHG
T AXIS - MUSE: 34 DEGREES
T4 FREE SERPL-MCNC: 0.68 NG/DL (ref 0.76–1.46)
TRANSFERRIN SERPL-MCNC: 122 MG/DL (ref 210–360)
TRANSITIONAL EPI: <1 /HPF
TRIGL SERPL-MCNC: 364 MG/DL
TSH SERPL DL<=0.005 MIU/L-ACNC: 0.11 MU/L (ref 0.4–4)
UROBILINOGEN UR STRIP-MCNC: NORMAL MG/DL
VENTRICULAR RATE- MUSE: 85 BPM
WBC # BLD AUTO: 27.2 10E3/UL (ref 4–11)
WBC # BLD AUTO: 27.9 10E3/UL (ref 4–11)
WBC URINE: 1 /HPF

## 2021-09-07 PROCEDURE — 83880 ASSAY OF NATRIURETIC PEPTIDE: CPT | Performed by: STUDENT IN AN ORGANIZED HEALTH CARE EDUCATION/TRAINING PROGRAM

## 2021-09-07 PROCEDURE — 80048 BASIC METABOLIC PNL TOTAL CA: CPT | Performed by: STUDENT IN AN ORGANIZED HEALTH CARE EDUCATION/TRAINING PROGRAM

## 2021-09-07 PROCEDURE — 71250 CT THORAX DX C-: CPT

## 2021-09-07 PROCEDURE — 72070 X-RAY EXAM THORAC SPINE 2VWS: CPT | Mod: 26 | Performed by: STUDENT IN AN ORGANIZED HEALTH CARE EDUCATION/TRAINING PROGRAM

## 2021-09-07 PROCEDURE — 73522 X-RAY EXAM HIPS BI 3-4 VIEWS: CPT

## 2021-09-07 PROCEDURE — 86235 NUCLEAR ANTIGEN ANTIBODY: CPT | Performed by: STUDENT IN AN ORGANIZED HEALTH CARE EDUCATION/TRAINING PROGRAM

## 2021-09-07 PROCEDURE — 73522 X-RAY EXAM HIPS BI 3-4 VIEWS: CPT | Mod: 26 | Performed by: RADIOLOGY

## 2021-09-07 PROCEDURE — 93005 ELECTROCARDIOGRAM TRACING: CPT

## 2021-09-07 PROCEDURE — 86706 HEP B SURFACE ANTIBODY: CPT | Performed by: INTERNAL MEDICINE

## 2021-09-07 PROCEDURE — 86900 BLOOD TYPING SEROLOGIC ABO: CPT | Performed by: INTERNAL MEDICINE

## 2021-09-07 PROCEDURE — 71046 X-RAY EXAM CHEST 2 VIEWS: CPT

## 2021-09-07 PROCEDURE — 76000 FLUOROSCOPY <1 HR PHYS/QHP: CPT

## 2021-09-07 PROCEDURE — 86777 TOXOPLASMA ANTIBODY: CPT | Performed by: INTERNAL MEDICINE

## 2021-09-07 PROCEDURE — 86832 HLA CLASS I HIGH DEFIN QUAL: CPT | Performed by: INTERNAL MEDICINE

## 2021-09-07 PROCEDURE — 85027 COMPLETE CBC AUTOMATED: CPT | Performed by: INTERNAL MEDICINE

## 2021-09-07 PROCEDURE — 82785 ASSAY OF IGE: CPT | Performed by: INTERNAL MEDICINE

## 2021-09-07 PROCEDURE — 86803 HEPATITIS C AB TEST: CPT | Performed by: INTERNAL MEDICINE

## 2021-09-07 PROCEDURE — 83036 HEMOGLOBIN GLYCOSYLATED A1C: CPT | Performed by: INTERNAL MEDICINE

## 2021-09-07 PROCEDURE — 81001 URINALYSIS AUTO W/SCOPE: CPT | Performed by: INTERNAL MEDICINE

## 2021-09-07 PROCEDURE — A9540 TC99M MAA: HCPCS | Performed by: STUDENT IN AN ORGANIZED HEALTH CARE EDUCATION/TRAINING PROGRAM

## 2021-09-07 PROCEDURE — 250N000013 HC RX MED GY IP 250 OP 250 PS 637: Performed by: STUDENT IN AN ORGANIZED HEALTH CARE EDUCATION/TRAINING PROGRAM

## 2021-09-07 PROCEDURE — 83735 ASSAY OF MAGNESIUM: CPT | Performed by: INTERNAL MEDICINE

## 2021-09-07 PROCEDURE — 86355 B CELLS TOTAL COUNT: CPT | Performed by: STUDENT IN AN ORGANIZED HEALTH CARE EDUCATION/TRAINING PROGRAM

## 2021-09-07 PROCEDURE — 343N000001 HC RX 343: Performed by: STUDENT IN AN ORGANIZED HEALTH CARE EDUCATION/TRAINING PROGRAM

## 2021-09-07 PROCEDURE — 87086 URINE CULTURE/COLONY COUNT: CPT | Performed by: INTERNAL MEDICINE

## 2021-09-07 PROCEDURE — 85730 THROMBOPLASTIN TIME PARTIAL: CPT | Performed by: INTERNAL MEDICINE

## 2021-09-07 PROCEDURE — 99233 SBSQ HOSP IP/OBS HIGH 50: CPT | Performed by: STUDENT IN AN ORGANIZED HEALTH CARE EDUCATION/TRAINING PROGRAM

## 2021-09-07 PROCEDURE — 87389 HIV-1 AG W/HIV-1&-2 AB AG IA: CPT | Performed by: INTERNAL MEDICINE

## 2021-09-07 PROCEDURE — 84443 ASSAY THYROID STIM HORMONE: CPT | Performed by: INTERNAL MEDICINE

## 2021-09-07 PROCEDURE — 81378 HLA I & II TYPING HR: CPT | Performed by: INTERNAL MEDICINE

## 2021-09-07 PROCEDURE — 82550 ASSAY OF CK (CPK): CPT | Performed by: STUDENT IN AN ORGANIZED HEALTH CARE EDUCATION/TRAINING PROGRAM

## 2021-09-07 PROCEDURE — 87340 HEPATITIS B SURFACE AG IA: CPT | Performed by: INTERNAL MEDICINE

## 2021-09-07 PROCEDURE — 36592 COLLECT BLOOD FROM PICC: CPT | Performed by: INTERNAL MEDICINE

## 2021-09-07 PROCEDURE — 85610 PROTHROMBIN TIME: CPT | Performed by: INTERNAL MEDICINE

## 2021-09-07 PROCEDURE — 250N000013 HC RX MED GY IP 250 OP 250 PS 637: Performed by: PHYSICIAN ASSISTANT

## 2021-09-07 PROCEDURE — 84100 ASSAY OF PHOSPHORUS: CPT | Performed by: INTERNAL MEDICINE

## 2021-09-07 PROCEDURE — 93306 TTE W/DOPPLER COMPLETE: CPT | Mod: 26 | Performed by: INTERNAL MEDICINE

## 2021-09-07 PROCEDURE — 99233 SBSQ HOSP IP/OBS HIGH 50: CPT | Mod: GC | Performed by: INTERNAL MEDICINE

## 2021-09-07 PROCEDURE — 86704 HEP B CORE ANTIBODY TOTAL: CPT | Performed by: INTERNAL MEDICINE

## 2021-09-07 PROCEDURE — 85025 COMPLETE CBC W/AUTO DIFF WBC: CPT | Performed by: STUDENT IN AN ORGANIZED HEALTH CARE EDUCATION/TRAINING PROGRAM

## 2021-09-07 PROCEDURE — 82150 ASSAY OF AMYLASE: CPT | Performed by: INTERNAL MEDICINE

## 2021-09-07 PROCEDURE — 999N000208 ECHOCARDIOGRAM COMPLETE

## 2021-09-07 PROCEDURE — 78580 LUNG PERFUSION IMAGING: CPT

## 2021-09-07 PROCEDURE — 250N000011 HC RX IP 250 OP 636: Performed by: PHYSICIAN ASSISTANT

## 2021-09-07 PROCEDURE — 82784 ASSAY IGA/IGD/IGG/IGM EACH: CPT | Performed by: INTERNAL MEDICINE

## 2021-09-07 PROCEDURE — 99207 PR NO CHARGE LOS: CPT | Performed by: INTERNAL MEDICINE

## 2021-09-07 PROCEDURE — 86708 HEPATITIS A ANTIBODY: CPT | Performed by: INTERNAL MEDICINE

## 2021-09-07 PROCEDURE — 86787 VARICELLA-ZOSTER ANTIBODY: CPT | Performed by: INTERNAL MEDICINE

## 2021-09-07 PROCEDURE — 86644 CMV ANTIBODY: CPT | Performed by: INTERNAL MEDICINE

## 2021-09-07 PROCEDURE — 83605 ASSAY OF LACTIC ACID: CPT | Performed by: STUDENT IN AN ORGANIZED HEALTH CARE EDUCATION/TRAINING PROGRAM

## 2021-09-07 PROCEDURE — 71045 X-RAY EXAM CHEST 1 VIEW: CPT

## 2021-09-07 PROCEDURE — 80061 LIPID PANEL: CPT | Performed by: INTERNAL MEDICINE

## 2021-09-07 PROCEDURE — 78580 LUNG PERFUSION IMAGING: CPT | Mod: 26 | Performed by: STUDENT IN AN ORGANIZED HEALTH CARE EDUCATION/TRAINING PROGRAM

## 2021-09-07 PROCEDURE — 93010 ELECTROCARDIOGRAM REPORT: CPT | Performed by: INTERNAL MEDICINE

## 2021-09-07 PROCEDURE — 120N000003 HC R&B IMCU UMMC

## 2021-09-07 PROCEDURE — 94660 CPAP INITIATION&MGMT: CPT

## 2021-09-07 PROCEDURE — 86481 TB AG RESPONSE T-CELL SUSP: CPT | Performed by: INTERNAL MEDICINE

## 2021-09-07 PROCEDURE — 83721 ASSAY OF BLOOD LIPOPROTEIN: CPT | Performed by: INTERNAL MEDICINE

## 2021-09-07 PROCEDURE — 72100 X-RAY EXAM L-S SPINE 2/3 VWS: CPT

## 2021-09-07 PROCEDURE — 86140 C-REACTIVE PROTEIN: CPT | Performed by: STUDENT IN AN ORGANIZED HEALTH CARE EDUCATION/TRAINING PROGRAM

## 2021-09-07 PROCEDURE — 86833 HLA CLASS II HIGH DEFIN QUAL: CPT | Performed by: INTERNAL MEDICINE

## 2021-09-07 PROCEDURE — 258N000001 HC RX 258: Performed by: STUDENT IN AN ORGANIZED HEALTH CARE EDUCATION/TRAINING PROGRAM

## 2021-09-07 PROCEDURE — 71250 CT THORAX DX C-: CPT | Mod: 26 | Performed by: RADIOLOGY

## 2021-09-07 PROCEDURE — 250N000011 HC RX IP 250 OP 636: Performed by: STUDENT IN AN ORGANIZED HEALTH CARE EDUCATION/TRAINING PROGRAM

## 2021-09-07 PROCEDURE — 84134 ASSAY OF PREALBUMIN: CPT | Performed by: INTERNAL MEDICINE

## 2021-09-07 PROCEDURE — 72100 X-RAY EXAM L-S SPINE 2/3 VWS: CPT | Mod: 26 | Performed by: RADIOLOGY

## 2021-09-07 PROCEDURE — 999N000185 HC STATISTIC TRANSPORT TIME EA 15 MIN

## 2021-09-07 PROCEDURE — 71045 X-RAY EXAM CHEST 1 VIEW: CPT | Mod: 26 | Performed by: RADIOLOGY

## 2021-09-07 PROCEDURE — 99221 1ST HOSP IP/OBS SF/LOW 40: CPT | Mod: GC | Performed by: INTERNAL MEDICINE

## 2021-09-07 PROCEDURE — 250N000009 HC RX 250: Performed by: PHYSICIAN ASSISTANT

## 2021-09-07 PROCEDURE — 82787 IGG 1 2 3 OR 4 EACH: CPT | Performed by: INTERNAL MEDICINE

## 2021-09-07 PROCEDURE — 86665 EPSTEIN-BARR CAPSID VCA: CPT | Performed by: INTERNAL MEDICINE

## 2021-09-07 PROCEDURE — 99223 1ST HOSP IP/OBS HIGH 75: CPT | Mod: 25 | Performed by: INTERNAL MEDICINE

## 2021-09-07 PROCEDURE — 76000 FLUOROSCOPY <1 HR PHYS/QHP: CPT | Mod: 26 | Performed by: RADIOLOGY

## 2021-09-07 PROCEDURE — 71046 X-RAY EXAM CHEST 2 VIEWS: CPT | Mod: 26 | Performed by: RADIOLOGY

## 2021-09-07 PROCEDURE — 36592 COLLECT BLOOD FROM PICC: CPT | Performed by: STUDENT IN AN ORGANIZED HEALTH CARE EDUCATION/TRAINING PROGRAM

## 2021-09-07 PROCEDURE — 84439 ASSAY OF FREE THYROXINE: CPT | Performed by: INTERNAL MEDICINE

## 2021-09-07 PROCEDURE — 72070 X-RAY EXAM THORAC SPINE 2VWS: CPT

## 2021-09-07 PROCEDURE — 86696 HERPES SIMPLEX TYPE 2 TEST: CPT | Performed by: INTERNAL MEDICINE

## 2021-09-07 PROCEDURE — 82306 VITAMIN D 25 HYDROXY: CPT | Performed by: INTERNAL MEDICINE

## 2021-09-07 PROCEDURE — 999N000157 HC STATISTIC RCP TIME EA 10 MIN

## 2021-09-07 RX ORDER — ASPIRIN 325 MG
325 TABLET ORAL ONCE
Status: CANCELLED | OUTPATIENT
Start: 2021-09-07 | End: 2021-09-07

## 2021-09-07 RX ORDER — LIDOCAINE 40 MG/G
CREAM TOPICAL
Status: CANCELLED | OUTPATIENT
Start: 2021-09-07

## 2021-09-07 RX ORDER — POTASSIUM CHLORIDE 750 MG/1
20 TABLET, EXTENDED RELEASE ORAL
Status: CANCELLED | OUTPATIENT
Start: 2021-09-07

## 2021-09-07 RX ORDER — ASPIRIN 81 MG/1
243 TABLET, CHEWABLE ORAL ONCE
Status: CANCELLED | OUTPATIENT
Start: 2021-09-07

## 2021-09-07 RX ORDER — POTASSIUM CHLORIDE 750 MG/1
40 TABLET, EXTENDED RELEASE ORAL
Status: CANCELLED | OUTPATIENT
Start: 2021-09-07

## 2021-09-07 RX ORDER — ACYCLOVIR 200 MG/1
12 CAPSULE ORAL ONCE
Status: COMPLETED | OUTPATIENT
Start: 2021-09-07 | End: 2021-09-07

## 2021-09-07 RX ORDER — AZITHROMYCIN 250 MG/1
250 TABLET, FILM COATED ORAL DAILY
Status: DISCONTINUED | OUTPATIENT
Start: 2021-09-07 | End: 2021-10-16

## 2021-09-07 RX ADMIN — INSULIN GLARGINE 5 UNITS: 100 INJECTION, SOLUTION SUBCUTANEOUS at 08:21

## 2021-09-07 RX ADMIN — CEFEPIME HYDROCHLORIDE 2 G: 2 INJECTION, POWDER, FOR SOLUTION INTRAVENOUS at 04:52

## 2021-09-07 RX ADMIN — AZITHROMYCIN MONOHYDRATE 250 MG: 250 TABLET ORAL at 16:02

## 2021-09-07 RX ADMIN — METHYLPREDNISOLONE SODIUM SUCCINATE 125 MG: 125 INJECTION, POWDER, FOR SOLUTION INTRAMUSCULAR; INTRAVENOUS at 13:23

## 2021-09-07 RX ADMIN — HEPARIN SODIUM 5000 UNITS: 5000 INJECTION, SOLUTION INTRAVENOUS; SUBCUTANEOUS at 08:21

## 2021-09-07 RX ADMIN — DOXYCYCLINE 100 MG: 100 INJECTION, POWDER, LYOPHILIZED, FOR SOLUTION INTRAVENOUS at 08:21

## 2021-09-07 RX ADMIN — FLUCONAZOLE 400 MG: 2 INJECTION, SOLUTION INTRAVENOUS at 20:25

## 2021-09-07 RX ADMIN — LEVOTHYROXINE SODIUM 25 MCG: 0.03 TABLET ORAL at 08:21

## 2021-09-07 RX ADMIN — INSULIN ASPART 2 UNITS: 100 INJECTION, SOLUTION INTRAVENOUS; SUBCUTANEOUS at 08:28

## 2021-09-07 RX ADMIN — METHYLPREDNISOLONE SODIUM SUCCINATE 125 MG: 125 INJECTION, POWDER, FOR SOLUTION INTRAMUSCULAR; INTRAVENOUS at 20:25

## 2021-09-07 RX ADMIN — KIT FOR THE PREPARATION OF TECHNETIUM TC 99M ALBUMIN AGGREGATED 7.2 MILLICURIE: 2.5 INJECTION, POWDER, FOR SOLUTION INTRAVENOUS at 14:26

## 2021-09-07 RX ADMIN — SULFAMETHOXAZOLE AND TRIMETHOPRIM 3 TABLET: 800; 160 TABLET ORAL at 13:23

## 2021-09-07 RX ADMIN — SULFAMETHOXAZOLE AND TRIMETHOPRIM 3 TABLET: 800; 160 TABLET ORAL at 20:25

## 2021-09-07 RX ADMIN — SULFAMETHOXAZOLE AND TRIMETHOPRIM 3 TABLET: 800; 160 TABLET ORAL at 08:21

## 2021-09-07 RX ADMIN — METHYLPREDNISOLONE SODIUM SUCCINATE 125 MG: 125 INJECTION, POWDER, FOR SOLUTION INTRAMUSCULAR; INTRAVENOUS at 08:21

## 2021-09-07 RX ADMIN — FLUTICASONE FUROATE 1 PUFF: 100 POWDER RESPIRATORY (INHALATION) at 08:21

## 2021-09-07 RX ADMIN — CEFEPIME HYDROCHLORIDE 2 G: 2 INJECTION, POWDER, FOR SOLUTION INTRAVENOUS at 13:23

## 2021-09-07 RX ADMIN — Medication 3 MG: at 20:25

## 2021-09-07 RX ADMIN — SODIUM CHLORIDE 12 ML: 9 INJECTION, SOLUTION INTRAMUSCULAR; INTRAVENOUS; SUBCUTANEOUS at 16:02

## 2021-09-07 RX ADMIN — AMLODIPINE BESYLATE 5 MG: 5 TABLET ORAL at 08:21

## 2021-09-07 RX ADMIN — CEFEPIME HYDROCHLORIDE 2 G: 2 INJECTION, POWDER, FOR SOLUTION INTRAVENOUS at 20:25

## 2021-09-07 RX ADMIN — INSULIN ASPART 5 UNITS: 100 INJECTION, SOLUTION INTRAVENOUS; SUBCUTANEOUS at 18:10

## 2021-09-07 RX ADMIN — PANTOPRAZOLE SODIUM 40 MG: 40 TABLET, DELAYED RELEASE ORAL at 16:02

## 2021-09-07 RX ADMIN — METHYLPREDNISOLONE SODIUM SUCCINATE 125 MG: 125 INJECTION, POWDER, FOR SOLUTION INTRAMUSCULAR; INTRAVENOUS at 02:01

## 2021-09-07 RX ADMIN — HEPARIN SODIUM 5000 UNITS: 5000 INJECTION, SOLUTION INTRAVENOUS; SUBCUTANEOUS at 20:25

## 2021-09-07 RX ADMIN — PANTOPRAZOLE SODIUM 40 MG: 40 TABLET, DELAYED RELEASE ORAL at 08:21

## 2021-09-07 RX ADMIN — ESCITALOPRAM OXALATE 5 MG: 5 TABLET, FILM COATED ORAL at 20:25

## 2021-09-07 ASSESSMENT — MIFFLIN-ST. JEOR: SCORE: 1466.76

## 2021-09-07 ASSESSMENT — ACTIVITIES OF DAILY LIVING (ADL)
ADLS_ACUITY_SCORE: 19

## 2021-09-07 NOTE — PLAN OF CARE
/Neuro: A&Ox4.   Cardiac: SR. VSS.   Respiratory: Sating >88 on % FiO2 Hi Flow NC and 30L, Bipap at NOC or for breaks at 55%  GI/: Adequate urine output. BM X2, using bedpan, not stable to get OOB to commode  Diet/appetite: Tolerating reg diet. Eating ok, on carb cover insulin  Activity:  Not able to get OOB due to oxygen demand. Using bedpan or urinal  Pain: At acceptable level on current regimen.   Skin: No new deficits noted. WOC to see patient 9/8  LDA's: RPICC    Plan: Work up for transplant continues. 9/8 patient will get right heart cath.

## 2021-09-07 NOTE — CONSULTS
[facility]  Palliative Care Consultation Note    Patient: Edson Thornton  Date of Admission:  9/5/2021    Requesting Clinician / Team: Dr. Woods  Reason for consult: Patient and family support, decisional support, lung transplant evaluation    Recommendations:  Patient seems to have appropriate goals and expectations regarding the possibility of a lung transplant. He seems to be processing his medical situation and cares in an appropriate manner. I see no red flags during our interview today, and agree with continued transplant evaluation.     These recommendations have been discussed with primary team.    Thank you for the opportunity to participate in the care of this patient and family. Our team: does not plan on following further, however do not hesitate to call or re-consult if we can be of further assistance to the patient/family.   During regular M-F work hours -- if you are not sure who specifically to contact -- please contact us by sending a text page to our team consult pager at [phone].  After regular work hours and on weekends/holidays, you can call our answering service at [phone]. Also, who's on call for us is available in [employer].       Assessments:  Edson Thornton is a 56 year old male with a past medical history of HTN, Rheumatoid arthritis and ILD who was transferred from an OSH with progressive hypoxic respiratory failure requiring need for urgent lung transplant evaluation.  He has been processing his illness in an appropriate fashion and has appropriate goals and expectations in regards to his possible lung transplant.     Today, the patient was seen for:  Hypoxic respiratory failure  Lung transplant evaluation    Prognosis, Goals, & Planning:    Functional Status just prior to hospitalization: 3 (Capable of only limited self-care; needs help with ADLs; in bed/chair >50% of waking hours)    Prognosis, Goals, and/or Advance  "Care Planning were addressed today: Yes        Summary/Comments: We discussed his understanding of his lung problems. He states he knows there is no cure and that lung transplant might lead to an improved QOL which is his goal.     Patient's decision making preferences: shared with support from loved onesstates he relies heavily on medical professionals and basically does what they tell him or \"why would I be here?\"        Patient has decision-making capacity today for complex decisions: Yes          I have concerns about the patient/family's health literacy today: No         Patient has a completed Health Care Directive: Reportedly yes, but not available to us currently.    Code status: Full Code    Coping, Meaning, & Spirituality:   Mood, coping, and/or meaning in the context of serious illness were addressed today: Yes  Summary/Comments: Discussed his difficulty losing his function related to his underlying lung disease. He finds it hard to be here in the hospital because he wants to be \"productive\" so lying here in the hospital or at home is hard for him.     Social:   Living situation: lives at home with [place]cos Galar[place] with his remi and her daughter  Sanders family / caregivers: Remi Eason and her daughter Zenia Eason  Occupational history: Was a  for 19 years and has been a  for the last 4    History of Present Illness:  History gathered today from: patient    Edson Thornton is a 56 year old male with a past medical history of HTN, Rheumatoid arthiritis and ILD woh was transferred from an OSH with progressive hypoxic respiratory failure requiring need for urgent lung transplant evaluation.  The patient notes he was disagnosed in [place] with RA after suffering from joint complaints. Then he developed progressive SOB first noted with exertion at high altitudes (he is a  and drives through the mountains to [place]) and that progressed to oxygen requirement at home. He was " [facility] with progressive O2 needs and SOB. Prior to this hospitalization thte patient notes to me that he was basically bed bound. He was using 4 L O2 at home via NC and with walking to the kitchen would note increased SOB and O2 sats that would dip into the mid 50's. He would increase his O2 and rest but would not improve for about 20 minutes. He denies any chest pain, N/V, abd pain or other complaints at this time.      Lung Transplant preparedness plan     Patient s legally designated health care agent: Peg aEson (bellaancee), Zenia Solomon (Peg's daughter)     Patient s description of how they make decisions (by themselves, in consultation with certain close loved ones, based on advice from medical professionals, etc):  independently with input from Hussain, states he usually does what the doctor's tell him because they are the experts     Patient s personal goals/hopes for receiving the transplant: His goals include an improved qulaity of life, he states he can not continue to live like he is.      Patient s worries/concerns when considering receiving the transplant: He is not concerned about the immediate problems that could happen with the surgery. He states his QOL is so poor right now he can not keep living like this. He is more concerned that his ILD could recur following the transplant and he would have done this all for nothing.      How does the patient envision or anticipate his/her future with the transplant? He is hopeful he will have an improved QOL. He wants to be productive with his life and as long as he can be he will be happy. He wants to be able to work again, he would love to be a  again but understands that is likely not possible. He wants to be able to do simple things like easy yard wok and go to the store.      Patient s thoughts about what constitutes a  good  quality of life: Being productive in some fashion. He states that as long as he can be  productive and contribute in some way he will be happy.      Patient s thoughts about what health conditions would be unacceptable (situations the patient would want her/his doctors and loved ones to know that she/he would not want life prolonged)? he is concerned about stroke and being in a vegetative state. He is concerned he could have a complication that would not allow him to lead a productive life.      Lung transplant carry a risk of of prolonged hospitalization and debilitation, what sort of functional outcomes would be acceptable vs unacceptable to the patient? Again the patient notes anything that will allow him to be productive. He does not want to live in a states where he is in a nursing home or vegetative state. He wants simple things like being able to go to the store and light yeardwork etc.     There are risks for kidney failure in patients with lung transplant and immunosuppressant medications, including the risk of needing dialysis.  What do you know about dialysis? How would your possible need for dialysis influence your quality of life? The patient has never known someone on dialysis but thinks it would be okay with a quality of life he would find acceptable moving forward. He states if he had to go to dialysis 3 days a week as long as he could be productive the rest of the time he would be okay with it.      Chronic mechanical ventilation via a tracheostomy tube is a risk. What are the circumstances the patient would want her/his physicians and family to keep them alive with long-term mechanical ventilation vs allow them to die?  He states it would be okay in the short term as long as we were working toward a life he could be productive. He does not think he would want to be on a ventilator in a facility for the long term.     Key Palliative Symptom Data:  # Pain severity the last 12 hours: none  # Dyspnea severity the last 12 hours: moderate  # Nausea severity the last 12 hours:  none    Patient is on opioids: bowels not assessed today.    ROS:  Comprehensive ROS is reviewed and is negative except as here & per HPI     Past Medical History:  Past Medical History:   Diagnosis Date    Anxiety     Depression     HLD (hyperlipidemia)     HTN (hypertension)     Hypothyroidism     ILD (interstitial lung disease) (H)     SALTY on CPAP     Oxygen dependent     BL 4L since ~6/2021    Rheumatoid arthritis (H)     signs ~5/2020, dx 5/2021    Steroid-induced hyperglycemia     Traction bronchiectasis (H)         Past Surgical History:  Past Surgical History:   Procedure Laterality Date    COLONOSCOPY W/ BIOPSIES AND POLYPECTOMY  07/21/2020    HERNIA REPAIR      right acl      XR ACROMIOCLAVICULAR JOINT BILATERAL           Family History:  Family History   Problem Relation Age of Onset    Diabetes Type 1 Mother     Heart Disease Mother     Chronic Obstructive Pulmonary Disease Mother     Rheumatoid Arthritis Father     Emphysema Paternal Grandfather          Allergies:  No Known Allergies     Medications:  I have reviewed this patient's medication profile and medications from this hospitalization.   Noted:  Reviewed in MAR    Physical Exam:  Vital Signs: Temp: 97.7  F (36.5  C) Temp src: Oral BP: 133/86 Pulse: 100   Resp: 30 SpO2: (!) 89 % O2 Device: High Flow Nasal Cannula (HFNC) Oxygen Delivery: 30 LPM  Weight: 160 lbs 0 oz   General- pt awake and oriented appropriately, resting comfortably in bed NAD  HEENT- MMM, no scleral icterus, HFNC in place  Pulm- no increaed WOB, speaking in full sentences    Data reviewed:  Recent imaging reviewed, my comments on pertinents:   CXR 9/5/21                                                                   IMPRESSION:   1. Right upper extremity PICC projects near the superior cavoatrial  junction.  2. Patchy perihilar opacities without edema, infection, and/or  atelectasis.    Venous Duplex low ext Bilateral 9/5/21 OSH  Right:    Compressibility Augmentation  Phasicity   Common Femoral Vein Compresses Normal Normal   Saphenofemoral Junction Compresses Normal Normal   Proximal Greater Saphenous Vein  Normal     Mid Greater Saphenous Vein Thigh  Normal     Distal Greater Saphenous Vein Thigh  Normal     Proximal Femoral Vein Compresses Normal Normal   Mid Femoral Vein Compresses     Distal Femoral Vein Compresses     Popliteal Vein Compresses Normal Normal   Posterior Tibial Veins Compresses Normal     Peroneal Veins Compresses       Left:    Compressibility Augmentation Phasicity   Common Femoral Vein Compresses Normal Normal   Saphenofemoral Junction Compresses Normal Normal   Proximal Greater Saphenous Vein  Normal     Mid Greater Saphenous Vein Thigh  Normal     Distal Greater Saphenous Vein Thigh  Normal     Proximal Femoral Vein Compresses Normal Normal   Mid Femoral Vein Compresses     Distal Femoral Vein Compresses     Popliteal Vein Compresses Normal Normal   Posterior Tibial Veins Compresses Normal     Peroneal Veins Compresses                CT Chest without contrast 8/30/21 OSH  IMPRESSION:   1.  Features of progressive pulmonary interstitial pneumonia and fibrosis.   2.  Mild increase in size of numerous enlarged, likely reactive mediastinal lymph nodes.   3.  Mildly dilated central pulmonary arteries suggesting an element pulmonary arterial hypertension.         Recent lab data reviewed, my comments on pertinents:     Reviewed in Epic, creatinine 0.69, WBC 27.9, Hgb 10.9    Attending attestation:   Patient seen and evaluated with Dr. Osuna and I agree with findings and recs in this note.   Thank you for involving us in the patient's care.   Kenna Samuel MD / Palliative Medicine / Pager [phone] / After-Hours Answering Service [phone] / Main Palliative Clinic - [facility] [phone] / [facility] Inpatient Team Consult Pager [phone] (answered 8am-430pm M-F)

## 2021-09-07 NOTE — PLAN OF CARE
Major Shift Events:  pleasant . A&OX4 . Denied any pain , Hemodynamically stable . Afebrile. On HFNC FIO2 90% with 30 LPM during the day , BIPAP FIO2 55% @night . Breath sound with crackles . No BM during the shift . Voided via the urinal . Blood glucose in the 200's .   Plan: Continue to monitor and report any concerns.   For vital signs and complete assessments, please see documentation flowsheets.

## 2021-09-07 NOTE — PROGRESS NOTES
RHEUMATOLOGY PROGRESS NOTE     Edson Thornton MRN# [id number]   Age: 56 year old YOB: 1965     Date of Admission:     9/5/2021  Date of initial consult:     9/6/2021    Assessment and Plan:   Mr. Edson Thornton is a 57 yo with recent diagnosis of seropositive rheumatoid arthritis (+CCP, +RF) with associate ILD transferred from OSH for acute on chronic hypoxemic respiratory failure.     In this patient with recent findings of NSIP in the setting of seropositive RA is most concerning for RA related ILD. Suspect patient's clinical presentation is secondary to ILD flare. The patient received rituximab on 8/6 with a second dose delayed until 9/3. The patient has improved symptomatically since his methylprednisolone was increased to 125 mg q6h on 9/4, and his oxygen needs appear stable to improved. He remains on antibiotics as well as fluconazole (as BAL from 7/28 showed candida). Considering the recent second dose of the patient's rituximab, more time is likely needed to evaluate it's full effect.    At this time, we agree with continuing with steroid per pulmonary with IV methylprednisolone 125mg Q6H. Will hold off on additional immunosuppressant medications unless clinically decompensates. Appreciate pulmonology assistance in getting lung transplant evaluation process initiated.    Problem list:  Sero-positive RA (+CCP, +RF)  Interstitial Lung Disease  Acute on Chronic Hypoxemic Respiratory Failure  Duodenal abnormality  Hypothyroidism   Weight loss    Recommendations:  -- Will follow up aldolase, CK, Irene-1, SINCERE panel, CD 19 (currently pending)  -- Continue IV Methylprednisolone 125mg Q6H per Pulmonology  -- Hold off on additional immunosuppressant medications such as cytoxan at this time  -- Agree with Lung Transplant Evaluation      The patient was staffed with Dr. Camarillo.    Serafin Spann MD  Internal Medicine PGY-3  Pager #: [phone]    Staff: Patient discussed on rounds. I agree with the  above. He was off the floor both times we attempted to round on him. Not seen in person by me on this date. Chart reviewed independently.  Gume Camarillo MD  Rheumatology      Subjective/24 hour events:   No acute events overnight.  Today, patient reports his breathing and respiratory status is about the same as yesterday.  Has not noticed any drastic improvement or worsening.  He is having dry nonproductive cough without hemoptysis.  No chest pain.  Reports poor appetite though this has been going on for several weeks to months.  No fever or chills, nausea/vomiting, abdominal pain, changes in bowel movements or any other acute concerns.            Medications:     Current Facility-Administered Medications   Medication    acetaminophen (TYLENOL) tablet 325-650 mg    amLODIPine (NORVASC) tablet 5 mg    azithromycin (ZITHROMAX) tablet 250 mg    benzocaine 20% (HURRICAINE/TOPEX) 20 % spray 0.5-1 mL    bisacodyl (DULCOLAX) Suppository 10 mg    carboxymethylcellulose PF (REFRESH PLUS) 0.5 % ophthalmic solution 1 drop    ceFEPIme (MAXIPIME) 2 g vial to attach to  ml bag for ADULTS or 50 ml bag for PEDS    glucose gel 15-30 g    Or    dextrose 50 % injection 25-50 mL    Or    glucagon injection 1 mg    escitalopram (LEXAPRO) tablet 5 mg    fluconazole (DIFLUCAN) intermittent infusion 400 mg in NaCl    fluticasone (ARNUITY ELLIPTA) 100 MCG/ACT inhaler 1 puff    heparin ANTICOAGULANT injection 5,000 Units    insulin aspart (NovoLOG) injection (RAPID ACTING)    insulin aspart (NovoLOG) injection (RAPID ACTING)    insulin aspart (NovoLOG) injection (RAPID ACTING)    insulin glargine (LANTUS PEN) injection 5 Units    ipratropium - albuterol 0.5 mg/2.5 mg/3 mL (DUONEB) neb solution 3 mL    Or    ipratropium - albuterol 0.5 mg/2.5 mg/3 mL (DUONEB) neb solution 3 mL    levothyroxine (SYNTHROID/LEVOTHROID) tablet 25 mcg    lidocaine (LMX4) cream    lidocaine 1 % 0.1-1 mL    melatonin tablet 1 mg    melatonin tablet 3 mg     "methylPREDNISolone sodium succinate (solu-MEDROL) injection 125 mg    No lozenges or gum should be given while patient on BIPAP/AVAPS/AVAPS AE    ondansetron (ZOFRAN-ODT) ODT tab 4 mg    Or    ondansetron (ZOFRAN) injection 4 mg    pantoprazole (PROTONIX) EC tablet 40 mg    Patient may continue current oral medications    polyethylene glycol (MIRALAX) Packet 17 g    sodium chloride (PF) 0.9% PF flush 3 mL    sodium chloride (PF) 0.9% PF flush 3 mL    sodium chloride bacteriostatic 0.9 % flush 12 mL    sulfamethoxazole-trimethoprim (BACTRIM DS) 800-160 MG per tablet 3 tablet            Review of Systems:   Complete 8 point ROS completed and negative unless mentioned in subjective.          Physical Exam:   /86 (BP Location: Left arm)   Pulse 100   Temp 97.7  F (36.5  C) (Oral)   Resp 30   Ht 1.626 m (5' 4\")   Wt 72.6 kg (160 lb)   SpO2 (!) 89%   BMI 27.46 kg/m      General:  male, sitting in bed with HFNC, conversant, in mild respiratory distress  HEENT: Sclera non-injected.  CV: Regular rate and rhythm  Lung: slightly increased work of breathing on HFNC, +crackles noted in lung bilaterally  Abdomen: Soft, non-distended  Neuro: Awake, alert, CN grossly intact. Strength 5/5 shoulder abduction, elbow flexion,hip flexion bilaterally.   Skin/Hair: nail clubbing present, splinter hemorrhages bilateral hands  Msk:   Shoulders: No tenderness to palpation along glenohumeral joint . No effusion, warmth. Forward flexion and abduction to 180 degrees  Elbows: with full extension and extension, without effusion, erythema or increased warmth  Wrists: with full extension and extension, without effusion, erythema or increased warmth  Hands: MCPs, PIPs, DIPs without effusion, erythema or increased warmth. 5th finger PIPs bilaterally with reducible flexor deformity  Hips: no pain with flexion, internal or external rotation  L knee: No effusion, increased warmth or erythema. Flexion >120 degrees, full extension  R " knee: No effusion, increased warmth or erythema. Flexion >120 degrees, full extension  Bilateral ankles: inversion/eversion, plantar and dorsiflexion intact. No joint effusion.  Feet: No effusion of MTPs, PIPs, DIPs noted          Data:   Labs and imaging reviewed.     Labs here:  BMP WNL except elevated mg (2.5), WBC 25, hgb 10, plt wnl, T4 low at 0.73, TSH low at 0.19     CXR: diffuse interstitial infiltrates with patchy perihilar opacities     Outside labs:  High methemoglobin  Hep c/hep B negative  RICHELLE, ANCA, MPO, PR3 negative (7/19)  CCP >340,  (5/11) RF qualitative negative in 4/2020  TSH WNL (6/28)     BAL 7/28 56 nucleated cells, 37% neutrophils     6/28/21 CT chest w/contrast      Weight loss, unintentional    abnormal cxr, cough, wt loss          COMPARISON: PA and lateral views the chest 6/28/2021        TECHNIQUE: Axial imaging of the chest was performed following the uneventful intravenous administration of  Omnipaque 350 (see medical record for dose in route of administration). Standard 2-D coronal and sagittal reformats were performed and reviewed        FINDINGS:    Normal appearance of the visualized thyroid gland.         Mildly enlarged mediastinal and bilateral hilar lymph nodes including but not necessarily limited to the following:    *   18 x 13 mm right paratracheal image #[id number]    *  19 x 11 mm AP window image #[id number]    *  21 x 12 mm right tracheobronchial image #[id number]    *  18 x 14 mm right hilar image #[id number]    *  16 x 11 mm left hilar image #[id number]    *  18 x 14 mm subcarinal image #[id number]        Normal heart size. Coronary arterial atherosclerotic calcifications. No pericardial abnormalities.          Normal caliber thoracic aorta.  Normal caliber central pulmonary arteries without large central or proximal segmental pulmonary arterial filling defects.        Small hiatal hernia.        No endotracheal lesions. Areas of bilateral upper and lower lobe confluent peribronchovascular  groundglass opacities with intrinsic bronchiectasis. Sparse honeycombing anteriorly within the upper lobes. There are several small calcified granulomas. No pleural effusion or pneumothorax.        No acute or destructive bony changes.        No acute or concerning findings in the visualized upper abdomen. Heterogeneous hepatic parenchymal attenuation likely due to timing of intravenous contrast bolus.        IMPRESSION:    1.  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    2.  Mediastinal and bilateral hilar lymphadenopathy is likely inflammatory/reactive secondary to lung disease.    3.  Healed pulmonary granulomatous disease.    4.  Findings discussed with RASHAAD Maxwell, via telephone at 2:26 PM on 6/28/2021.           CT chest w/o contrast 8/30/21    INDICATION:Interstitial lung disease       COMPARISON: 7/28/2021     TECHNIQUE: Axial CT imaging of the chest was performed without the use of intravenous contrast media.     FINDINGS:   Normal appearance of the visualized thyroid gland.     Mild increased size of multiple mildly enlarged, reactive/inflammatory mediastinal lymph nodes.       Normal heart size.       Normal caliber thoracic aorta. Mildly dilated central pulmonary arteries measuring up to 32 mm in diameter suggesting an element of pulmonary arterial hypertension.     Small hiatal hernia.     No endotracheal lesions. Progressive diffuse bilateral groundglass and reticular interstitial pulmonary opacities. Areas of traction bronchiectasis are again noted along with some regions of evolving honeycomb-like subpleural pulmonary parenchymal architecture within the upper lobes (most pronounced medially).  Small calcified pulmonary granulomas.  No pleural effusion or pneumothorax.     Arthritic skeletal features.     No acute findings within the upper abdomen.     IMPRESSION:   1.  Features of progressive pulmonary interstitial pneumonia and fibrosis.   2.  Mild increase in  size of numerous enlarged, likely reactive mediastinal lymph nodes.   3.  Mildly dilated central pulmonary arteries suggesting an element pulmonary arterial hypertension.       CT Chest High-Resolution  9/7/2021  IMPRESSION:   1. Extensive bilateral groundglass and reticular opacities are  slightly increased compared to chest CT from 8/30/2021. This likely  represents progression of known NSIP, however superimposed infection  or cannot be excluded.  2. Small bilateral pleural effusions.  3. Stable mediastinal lymphadenopathy, likely reactive.      TTE  9/7/2021  Interpretation Summary  Global and regional left ventricular function is normal with an EF of 60-65%.  Global right ventricular function is normal.  No significant valvular abnormalities present.  The patent foramen ovale was demonstrated by color Doppler .  Estimated pulmonary artery systolic pressure is 40 mmHg. Pulmonary  hypertension is present.  The inferior vena cava was normal in size with preserved respiratory  variability.  No pericardial effusion is present.

## 2021-09-07 NOTE — PROGRESS NOTES
"CLINICAL NUTRITION SERVICES - ASSESSMENT NOTE     Nutrition Prescription    RECOMMENDATIONS FOR MDs/PROVIDERS TO ORDER:  Encourage oral intake     Malnutrition Status:    Malnutrition Diagnosis: Moderate (Non-severe) malnutrition in the context of chronic illness    Recommendations already ordered by Registered Dietitian (RD):  Pt is a  candidate for a lung transplant. From a nutrition standpoint, BMI <30   Future/Additional Recommendations:  Monitor appetite, oral intake - need for scheduled snacks and supplements   Monitor ability to provide post transplant nutrition therapy recommendations    If enteral nutrition becomes plan of care:   Pre transplant: Osmolite 1.5 Conner @ goal of  50ml/hr  (1200 ml/day)  will provide: 1800 kcals (28 kcal/kg), 75 g PRO (1.2) , 914 ml free H20, 244 g CHO, and 0 g fiber daily.    Post transplant: Osmolite 1.5 Conner @ goal of  60ml/hr  (1440ml/day)  will provide: 2160 kcals, 90 g PRO, 1097 ml free H20, 293 g CHO, and 0 g fiber daily.       REASON FOR ASSESSMENT  Edson Thornton is a/an 56 year old male assessed by the dietitian for Admission Nutrition Risk Screen for positive (14-23 lbs and poor appetite) and Consult: Referral for lung transplant evaluation     CLINICAL HISTORY   PMH ILD and rheumatoid lung disease, RA, SALTY, hypothyroid, HTN,anxiety and depression, HLD, duodenal anomaly admitted on 9/5/2021 in transfer for lung transplant workup and risk of need for ECMO.     NUTRITION HISTORY  Patient reported his appetite has not been great due to \"everything happening.\" He is eating about 50% of normal. B- 2 eggs, 2 slices toast, guevara, hash-browns and orange juice. L- skips, D- variable, fiance cooks.   He reported he has previously lost weight but gained it back to usual weight of 160 lbs. He has no food allergies. Does not follow special diets at home. No current vitamin/mineral supplements.     CURRENT NUTRITION ORDERS  Diet: Regular  Intake/Tolerance: %     LABS (9/6)  TSH " "0.19 (L)/ T4 0.873 (L)  Mg 2.5 (H)  Glucose 122-257 (past 5)     MEDICATIONS  Novolog/Lanuts  Levothyroxine  Methylprednisolone  Protonix    ANTHROPOMETRICS  Height: 162.6 cm (5' 4\")  Most Recent Weight: 72.6 kg (160 lb)    IBW: 59.1 kg  BMI: Overweight BMI 25-29.9  Weight History:   Wt Readings from Last 15 Encounters:   09/07/21 72.6 kg (160 lb)     Dosing Weight: 62.4 kg (adjusted based on current weight and IBW)    ASSESSED NUTRITION NEEDS PRE TRANSPLANT   Estimated Energy Needs: 9592-7129 kcals/day (25 - 30 kcals/kg)  Justification: Maintenance  Estimated Protein Needs: 75-94 grams protein/day (1.2 - 1.5 grams of pro/kg)  Justification: Increased needs during hospitalization  Estimated Fluid Needs: 1298-7485 mL/day (1 mL/kcal)   Justification: Maintenance      ASSESSED NUTRITION NEEDS POST TRANSPLANT   Estimated Energy Needs: 3132-3967 kcals/day (30 - 35 kcals/kg)  Justification: Increased needs post surgery  Estimated Protein Needs:  grams protein/day (1.3 -2 grams of pro/kg)  Justification: Post op/s/p transplant   Estimated Fluid Needs: 5508-1300 mL/day (25-30 mL/kcal)   Justification: Maintenance    PHYSICAL FINDINGS  See malnutrition section below.    MALNUTRITION  % Intake: < 75% for >/= 3 months (non-severe)  % Weight Loss: Weight loss does not meet criteria  Subcutaneous Fat Loss: None observed  Muscle Loss: Temporal:  mild and Dorsal hand:  moderate  Fluid Accumulation/Edema: None noted  Malnutrition Diagnosis: Moderate (Non-severe) malnutrition in the context of chronic illness    NUTRITION DIAGNOSIS  Food- and nutrition-related knowledge deficit related to lack of prior education as evidenced by patient report of transplant recommendations      INTERVENTIONS  Implementation  Nutrition Education:   Discussed RD role in care, importance of protein and reviewed protein sources  - Patient overwhelmed and did not discuss post transplant guidelines at this time    Collaboration with other providers "     Goals  Patient to consume % of nutritionally adequate meal trays TID, or the equivalent with supplements/snacks.     Monitoring/Evaluation  Progress toward goals will be monitored and evaluated per protocol.    Janny Khan RD, ANTONI  6B pager: [phone]

## 2021-09-07 NOTE — PROGRESS NOTES
Medicine Progress Note - Hospitalist Service, Gold 10    Date of Admission:  9/5/2021  Date of Service (when I saw the patient): 09/07/2021    Assessment & Plan   Edson Thornton is a 56 year old male with PMH ILD and rheumatoid lung disease, RA, SALTY, hypothyroid, HTN,anxiety and depression, HLD, duodenal anomaly admitted on 9/5/2021 in transfer for lung transplant workup.     Today Updates  -Appreciate pulmonary medicine involvement for transplant evaluation  -Continue high flow nasal cannula with target SPO2 greater than 88%  -Appreciate rheumatology involvement, will continue steroids and send additional laboratory studies  -Continue broad-spectrum antibiotics      Acute on chronic hypoxic respiratory failure   ILD with concern for exacerbation of ILD  Symptoms of dyspnea started ~6/2021 and now with progressive dyspnea and hypoxemia with minimal activity over the last weeks to month.   Negative infectious work-up and no significant improvement with antibiotics.  Did have some subjective improvement with high-dose steroids which we are continuing at this time.  Infection seems less likely to be driving severe hypoxemic respiratory failure.  This is more likely consistent with severe and progressive ILD.  -Appreciate pulmonary consultation and their involvement with pretransplant evaluation  - RT consult for BiPAP  - maintain O2 sats > 88 %  - Abx: Bactrim DS 3 tabs q8h, Cefepime 2g q8h, doxycycline 100mg BID  - Diflucan 400mg IV daily  - Solumedrol 125mg q6h, and PPI  - fluticasone inhaler daily  - Duonebs 4 x daily and prn   - consider lasix as needed (received several doses at OSH)  - DVT prophylaxis with heparin  - FEN: reg diet as able  - sputum culture as able     Infectious workup:   Infection workup so far is neg:   Viral: 8/30 parainfluenza, flu, rsv, COVID-19 neg. 9/1 hepatis A, B, C neg.    - 7/29 Mycoplasma IgG, neg IgM, c/w past infection. TB quant 5/11 neg.  Histo/blasto/ coccidioides/ aspergiluus  9/2 Neg.  - Pneumocystitis testing is pending (awaiting sputum collection).   -Urine legionella and S. pneumo Ag neg  - S/p bronchoscopy 7/28 with cultures only showing small amount of candida (on fluconazole)  - Procalcitonin is only mildly elevated at 0.21-> repeat procal is 0.08.      Transplant workup  Transfer was specifically for transplant workup  - consult to transplant pulm      Rheumatoid arthritis  Initial symptoms ~5/2020 with joint pains and official dx 5/2021. Follows with Dr. Yoo in [place]. 5/11 labs show CCP Ab >340, Rheum factor quant 100. Hypersensitivy panel 7/19 neg.  7/19 labs: RICHELLE screen, ANCA screen, myeloperodidase Ab, Proteinase 3 Ab neg. Immunoglobins 7/19 normal.   - consult to rheumatology appreciated  -1 dose of rituximab 8/6/2021, did not complete dosing regimen due to insurance coverage issues as an outpatient, rituximab 2nd of 2 doses 9/4  - steroids as per above      Steroid-induced hyperglycemia  Glucoses  169 and 212 at OSH.   - continue lantus 5 units   - moderate ISS and hypoglycemia protocol.  - continue CHO ratio 1:15 today  - A1C pending     Leukocytosis  WBC 27 (9/4)--> 26 (9/5). Occurs in the setting of high dose steroids.   - Abx as per above  - trend CBC  - follow up pulm and rheum recs     SALTY on home CPAP [id number]  - continue CPAP vs BiPAP     HTN  Admission bp mildly elevated in the setting of acute illness, hypoxia and high-dose steroids  - continue norvasc 5mg daily  HLD - 10/2020 lipid panel T-cholesterol 148, trig 151, HDL 31. - consider statin  Anxiety/Depression - continue PTA lexapro   Hypothyroidism - TSH 6/28/21 0.76- continue synthroid 25mcg  GERD - cont PPI       Diet: Regular Diet Adult    DVT Prophylaxis: Heparin SQ  Watson Catheter: Not present    Code Status: Full Code           Disposition Plan   Expected discharge:  In > 3 days recommended to transitional care unit once O2 use less than 4 liters/minute.  Entered: Gume Baldwin MD 09/07/2021, 12:57  PM       The patient's care was discussed with the Patient and Pulmonary Team.    Gume Baldwin MD  Hospitalist Service, [address]  Please see sign in/sign out for up to date coverage information  ______________________________________________________________________      Interval History   Nursing notes reviewed.  Patient seen or discussed with bedside nurse.  Breathing feels about the same today.  Denies cough but continues to endorse shortness of breath.  Was significantly short of breath and hypoxic when ambulating to the bedside commode yesterday.  Denies abdominal pain.  Endorses having bowel movement and adequate urine output.  Endorses fair appetite today.      Data reviewed today: I reviewed all medications, new labs and imaging results over the last 24 hours.    Physical Exam     Vital Signs: Temp: 97.7  F (36.5  C) Temp src: Oral BP: 133/86 Pulse: 100   Resp: 30 SpO2: (!) 89 % O2 Device: High Flow Nasal Cannula (HFNC) Oxygen Delivery: 30 LPM  Weight: 160 lbs 0 oz  I/O last 3 completed shifts:  In: 1910 [P.O.:1260; I.V.:650]  Out: 1650 [Urine:1650]      Constitutional: alert and mild respiratory distress   Cardiovascular: RRR. No murmurs  Respiratory/pulmonary: Increased work of breathing with crackles throughout lung fields  Abdomen: Abdomen soft, non-tender. BS normal.    Psychiatric: mentation appears normal and affect normal/bright  Neuro: Speech normal, alert and oriented to person, place, time, situation    Medications     - MEDICATION INSTRUCTIONS -       - MEDICATION INSTRUCTIONS -         amLODIPine  5 mg Oral Daily     ceFEPIme (MAXIPIME) IV  2 g Intravenous Q8H     doxycycline (VIBRAMYCIN) IV  100 mg Intravenous Q12H     escitalopram  5 mg Oral QPM     fluconazole  400 mg Intravenous Q24H     fluticasone  1 puff Inhalation Daily     heparin ANTICOAGULANT  5,000 Units Subcutaneous Q12H     insulin aspart   Subcutaneous TID AC     insulin  aspart  1-7 Units Subcutaneous TID AC     insulin aspart  1-5 Units Subcutaneous At Bedtime     insulin glargine  5 Units Subcutaneous QAM AC     levothyroxine  25 mcg Oral QAM AC     melatonin  3 mg Oral At Bedtime     methylPREDNISolone  125 mg Intravenous Q6H     pantoprazole  40 mg Oral BID AC     sodium chloride (PF)  3 mL Intracatheter Q8H     sulfamethoxazole-trimethoprim  3 tablet Oral TID       Gume Baldwin MD

## 2021-09-07 NOTE — CONSULTS
[facility]   Pulmonary   Consult Note  Edson Thornton MRN: [id number]  1965  Date of Admission:9/5/2021    We were consulted for evaluation of ILD management by the [facility] service    Assessment & Plan    56-year-old male (BMI 27.6) with history of NSIP/ILD, RA (status post Rituxan infusion, leflunomide), SALTY, HTN transferred from OSH for worsening acute hypoxemic respiratory failure in the setting of progressive ILD necessitating evaluation for lung transplant.     1. Acute hypoxic respiratory failure  2. NSIP ILD  3. Rheumatoid arthritis (positive anti-CCP, RF)     Discussion:   56 year old male with PMHx most significant for RA and NSIP/ILD with rapidly increasing oxygen requirements in the setting of progressive hypoxic respiratory failure.  This is patient's third hospitalization within the past month, and the second related to his progressive dyspnea.  His infectious work-up at OSH has been largely negative--including negative fungal serologies, COVID-19 testing, numerous bacterial antigen testings, respiratory cultures, and RVP.  Moreover patient has undergone multiple steroid regimens and was recently on methylprednisolone 125 mg q6h IV prior to [facility] transfer.     Patient continues to require high flow nasal cannula 90% O2 to maintain oxygenation levels in the low 90s.  Given his persistent hypoxemia despite adequate glucocorticoid, antifungal, and antibiotic therapies, a discussion regarding lung transplantation is warranted. PFTs taken in July of this year demonstrate what appears to be a restrictive lung pattern.  If lung transplantation were to proceed, then patient would require extensive cardiac work-up including right heart and left heart catheterizations along with further consideration by the multidisciplinary transplant team.     Regarding his ILD management, patient is receiving methyprednisolone 125 mg IV q6H with minimal improvement. Repeat CT chest will be needed given  that outside images have not been pushed through PACS, and also because patient's condition has worsened. Repeat TTE and BNP check (OSH was mildly elevated at 113) will be useful to assess volume status as a contributor to his clinical picture.         Recommendations:    - Will obtain high resolution Chest CT (ordered for you)   - Repeat CRP, NT-BNP   - Agree with repeating TTE   - Switched doxycycline to azithromycin given its anti-inflammatory properties (ordered for you)   - EKG ordered     We will continue to follow.     Patient seen & discussed w/  Dr. Daniels.     Faisal Caban MD   Pulmonary/Critical Care Fellow   Pager #[phone]           History of Present Illness:   Edson Thornton is a 56 year old male with history of rheumatoid arthritis, NSIP/ILD, SALTY, HTN and HLD who presented to [facility] on 9/5/2021 as a transfer from [place] with complaints of rapidly progressive dyspnea on exertion beginning in July 2021.  Patient's history is remarkable for diagnosis of RA in May of 2021 after experiencing arthralgias since 09/2020 (initial workup was negative for RA, but then found to be anti-CCP +). He was also dx with ILD in July for which he has progressed from being on room air to requiring 2 to 4 L home O2, and now being on high flow prior to his transfer.  Patient states that his symptoms have not abated since end of July when he was discharged after his first hospitalization with a steroid regimen.  He notes undergoing multiple lab testings and has had a bronchoscopy with BAL to investigate for infection as the main cause of his dyspnea on exertion and shortness of breath.  He denies fever, chills, sore throat, hemoptysis, night sweats, chest pain, lightheadedness, or headache.        Despite extensive work-up for infection and trialing multiple treatments, patient remains short of breath with low O2 reads while on supplemental oxygen. CT Chest taken 8/30 was notable for progressive interstitial  pneumonia and fibrosis and dilated central pulmonary arteries. His TTE was notable for an LVEF 60-65%.      Patient is a non-smoker smoker w/ no significant history. Patient reports rare EtOH use. Patient reports no recreational drug use. He worked as a  for ~20 years and was a  for the past 4 years.           Review of Symptoms:   10-point ROS reviewed, & found negative w/ exceptions noted in the HPI.          Past Medical History:     Past Medical History:   Diagnosis Date     Anxiety      Depression      HLD (hyperlipidemia)      HTN (hypertension)      Hypothyroidism      ILD (interstitial lung disease) (H)      SALTY on CPAP      Oxygen dependent     BL 4L since ~6/2021     Rheumatoid arthritis (H)     signs ~5/2020, dx 5/2021     Steroid-induced hyperglycemia      Traction bronchiectasis (H)        Past Surgical History:   Procedure Laterality Date     COLONOSCOPY W/ BIOPSIES AND POLYPECTOMY  07/21/2020     HERNIA REPAIR       right acl       XR ACROMIOCLAVICULAR JOINT BILATERAL              Allergies:     No Known Allergies          Outpatient Medications:     No current facility-administered medications on file prior to encounter.  acetaminophen (TYLENOL) 325 MG tablet, Take 325 mg by mouth every 6 hours as needed for mild pain  amLODIPine (NORVASC) 5 MG tablet, Take 5 mg by mouth daily  escitalopram (LEXAPRO) 5 MG tablet, Take 5 mg by mouth daily  fluticasone-vilanterol (BREO ELLIPTA) 200-25 MCG/INH inhaler, Inhale 1 puff into the lungs daily  insulin aspart (NOVOLOG FLEXPEN) 100 UNIT/ML pen, Inject 3-11 Units Subcutaneous With meals and at bedtime.  levothyroxine (SYNTHROID/LEVOTHROID) 25 MCG tablet, Take 25 mcg by mouth daily  omeprazole (PRILOSEC) 40 MG DR capsule, Take 40 mg by mouth 2 times daily (before meals)  potassium chloride ER (KLOR-CON M) 20 MEQ CR tablet, Take 20 mEq by mouth daily  sulfamethoxazole-trimethoprim (BACTRIM DS) 800-160 MG tablet, Take 1 tablet by mouth Every  "Mon, Wed, Fri Morning               Family History:     Family History   Problem Relation Age of Onset     Diabetes Type 1 Mother      Heart Disease Mother      Chronic Obstructive Pulmonary Disease Mother      Rheumatoid Arthritis Father      Emphysema Paternal Grandfather                Social History:     Social History     Tobacco Use     Smoking status: Never Smoker     Smokeless tobacco: Never Used   Substance Use Topics     Alcohol use: Never     Drug use: Never             Physical Exam:   /86 (BP Location: Left arm)   Pulse 100   Temp 97.7  F (36.5  C) (Oral)   Resp 30   Ht 1.626 m (5' 4\")   Wt 72.6 kg (160 lb)   SpO2 91%   BMI 27.46 kg/m      General: Cooperative, NAD.  HEENT: Anicteric sclera, EOMI, MMM, OP unobstructed, w/o erythema/discharge; no cervical LAD  CV: RRR, no m/r/g  Lungs: Bibasilar crackles; speaking in full sentences on HFNC 90% O2.   Abd: Soft, NT, ND  Ext: WWP,  No LE edema  Skin: No rashes, cyanosis, or jaundice  Neuro: AAOx3, no focal deficits          Data:   Labs (all laboratory studies reviewed by me):      Lab Results   Component Value Date    WBC 27.9 (H) 09/07/2021    HGB 10.9 (L) 09/07/2021    HCT 33.6 (L) 09/07/2021     09/07/2021     09/07/2021    POTASSIUM 3.9 09/07/2021    CHLORIDE 107 09/07/2021    CO2 25 09/07/2021    BUN 23 09/07/2021    CR 0.69 09/07/2021     (H) 09/07/2021    AST 30 09/07/2021    ALT 39 09/07/2021    ALKPHOS 67 09/07/2021    BILITOTAL 0.2 09/07/2021    INR 1.16 (H) 09/07/2021         Imaging (all imaging studies reviewed by me):  Reviewed over the past 24 hours. Pending repeat chest CT high res.    ECHO and PFTs    Pending repeat TTE  "

## 2021-09-07 NOTE — CONSULTS
[facility]    Cardiology Consult Note      Date of Admission:  9/5/2021  Consult Requested by: Abiodun Woods  Reason for Consult: lung transplant evaluation    Assessment & Plan: HVSL   Edson Thornton is a 56 year old male with PMH of HTN, NSIP/ILD, RA, SALTY who presents for progressively worsening dyspnea currently undergoing expedited transplant evaluation. Request from pulmonary for patient to undergo RHC and LHC as part of evaluation.    Patient with good baseline functional status prior to development of worsening lung disease, being able to walk without limitation and be able to ambulate up multiple flights of stairs equivalent to >4METs activity. No personal prior cardiac history or angina. No current evidence of systemic volume overload c/f right heart failure. Will plan for RHC and coronary angiogram as part of pre-transplant evaluation.    -RHC and coronary angiogram ordered for 9/8  -TTE notes pHTN presence, mild in nature; RHC to further delineate etiology though would be presumptive group III  -EKG NSR  -consent signed and in patient chart     The patient's care was discussed with the Attending Physician, Dr. Pickens. Cardiology team will continue to follow for results of above workup as needed for further assistance regarding pre-transplant evaluation    Kaity Ortiz MD  [facility]  Pager: [phone]      ______________________________________________________________________    Chief Complaint   Pre lung transplant evaluation, request for right and left heart catheterization    History is obtained from the patient and chart    History of Present Illness   Edson Thornton is a 56 year old male with PMH of HTN, NSIP/ILD, RA, SALTY who presents for progressively worsening dyspnea currently undergoing expedited transplant evaluation. Request from pulmonary for patient to undergo RHC and LHC as part of  evaluation.    Initial dx of ILD noted in July with rather rapidly progressive symptoms and oxygen requirements. Notes substantial SOB and MYERS which limits his ability to walk regularly. Today, he is now on HFNC and sating in mid to low 90s. States he feels SOB when he exerts but is doing ok sitting still. No chest pain, abd distension, LE edema. Is able to sleep laying flat at night. States sx started in July and prior to that he was very active, walking as much as he wanted without limitation. Was able to go up stairs without SOB or chest pain.     States no personal heart history. States his mother did have MI in her 60s but otherwise no other family cardiac history that he knows of. Nonsmoker. No drug use    Review of Systems   The 10 point Review of Systems is negative other than noted in the HPI or here.     Past Medical History    I have reviewed this patient's medical history and updated it with pertinent information if needed.   Past Medical History:   Diagnosis Date     Anxiety      Depression      HLD (hyperlipidemia)      HTN (hypertension)      Hypothyroidism      ILD (interstitial lung disease) (H)      SALTY on CPAP      Oxygen dependent     BL 4L since ~6/2021     Rheumatoid arthritis (H)     signs ~5/2020, dx 5/2021     Steroid-induced hyperglycemia      Traction bronchiectasis (H)        Past Surgical History   I have reviewed this patient's surgical history and updated it with pertinent information if needed.  Past Surgical History:   Procedure Laterality Date     COLONOSCOPY W/ BIOPSIES AND POLYPECTOMY  07/21/2020     HERNIA REPAIR       right acl       XR ACROMIOCLAVICULAR JOINT BILATERAL         Social History   I have reviewed this patient's social history and updated it with pertinent information if needed.  Social History     Tobacco Use     Smoking status: Never Smoker     Smokeless tobacco: Never Used   Substance Use Topics     Alcohol use: Never     Drug use: Never     Family History   I  have reviewed this patient's family history and updated it with pertinent information if needed.   I have reviewed this patient's family history and updated it with pertinent information if needed.  Family History   Problem Relation Age of Onset     Diabetes Type 1 Mother      Heart Disease Mother      Chronic Obstructive Pulmonary Disease Mother      Rheumatoid Arthritis Father      Emphysema Paternal Grandfather        Medications   I have reviewed this patient's current medications    Allergies   No Known Allergies    Physical Exam   Vital Signs: Temp: 97.7  F (36.5  C) Temp src: Oral BP: 133/86 Pulse: 100   Resp: 30 SpO2: 91 % O2 Device: High Flow Nasal Cannula (HFNC) Oxygen Delivery: 30 LPM  Weight: 160 lbs 0 oz    General Appearance: ill appearing male breathing on HFNC, speaking in full sentences  HEENT: at/nc, eomi, mmm  Respiratory: fine crackles throughout lungs, moving adequate air, no accessory muscle use  Cardiovascular: tachycardic, regular, s1, s2 with splitting, no murmur, no JVD  GI: soft, nt, nd  Skin: warm, dry, well perfused  Musculoskeletal: adequate muscle bulk, no LE edema  Neurologic: alert, interactive, speech fluent, face symmetric        Data   Results for orders placed or performed during the hospital encounter of 09/05/21 (from the past 24 hour(s))   Glucose by meter   Result Value Ref Range    GLUCOSE BY METER POCT 156 (H) 70 - 99 mg/dL   Glucose by meter   Result Value Ref Range    GLUCOSE BY METER POCT 257 (H) 70 - 99 mg/dL   Lactic Acid STAT   Result Value Ref Range    Lactic Acid 1.1 0.7 - 2.0 mmol/L   Glucose by meter   Result Value Ref Range    GLUCOSE BY METER POCT 140 (H) 70 - 99 mg/dL   CBC with platelets   Result Value Ref Range    WBC Count 27.2 (H) 4.0 - 11.0 10e3/uL    RBC Count 3.75 (L) 4.40 - 5.90 10e6/uL    Hemoglobin 10.5 (L) 13.3 - 17.7 g/dL    Hematocrit 32.3 (L) 40.0 - 53.0 %    MCV 86 78 - 100 fL    MCH 28.0 26.5 - 33.0 pg    MCHC 32.5 31.5 - 36.5 g/dL    RDW 16.0  (H) 10.0 - 15.0 %    Platelet Count 153 150 - 450 10e3/uL   Basic metabolic panel   Result Value Ref Range    Sodium 139 133 - 144 mmol/L    Potassium 3.9 3.4 - 5.3 mmol/L    Chloride 108 94 - 109 mmol/L    Carbon Dioxide (CO2) 24 20 - 32 mmol/L    Anion Gap 7 3 - 14 mmol/L    Urea Nitrogen 22 7 - 30 mg/dL    Creatinine 0.73 0.66 - 1.25 mg/dL    Calcium 9.1 8.5 - 10.1 mg/dL    Glucose 158 (H) 70 - 99 mg/dL    GFR Estimate >90 >60 mL/min/1.73m2   Magnesium   Result Value Ref Range    Magnesium 2.2 1.6 - 2.3 mg/dL   Phosphorus   Result Value Ref Range    Phosphorus 3.2 2.5 - 4.5 mg/dL     Attending Attestation: Patient seen and examined on rounds with the cardiology consult team. I agree with the findings, assessment and plan of care documented in the edited note and summarized the chapa findings and plan with the patient.

## 2021-09-08 LAB
ABO/RH(D): NORMAL
ANION GAP SERPL CALCULATED.3IONS-SCNC: 5 MMOL/L (ref 3–14)
ANTIBODY SCREEN: NEGATIVE
BACTERIA UR CULT: NO GROWTH
BUN SERPL-MCNC: 22 MG/DL (ref 7–30)
CALCIUM SERPL-MCNC: 8.6 MG/DL (ref 8.5–10.1)
CD19 CELLS # BLD: 1 CELLS/UL (ref 107–698)
CD19 CELLS NFR BLD: <1 % (ref 6–27)
CHLORIDE BLD-SCNC: 107 MMOL/L (ref 94–109)
CMV IGG SERPL IA-ACNC: <0.2 U/ML
CMV IGG SERPL IA-ACNC: NORMAL
CO2 SERPL-SCNC: 26 MMOL/L (ref 20–32)
CREAT SERPL-MCNC: 0.76 MG/DL (ref 0.66–1.25)
EBV VCA IGG SER IA-ACNC: 186 U/ML
EBV VCA IGG SER IA-ACNC: POSITIVE
ENA SM IGG SER IA-ACNC: <1.6 U/ML
ENA SM IGG SER IA-ACNC: NEGATIVE
ENA SS-A AB SER IA-ACNC: <0.5 U/ML
ENA SS-A AB SER IA-ACNC: NEGATIVE
ENA SS-B IGG SER IA-ACNC: <0.6 U/ML
ENA SS-B IGG SER IA-ACNC: NEGATIVE
ERYTHROCYTE [DISTWIDTH] IN BLOOD BY AUTOMATED COUNT: 16.1 % (ref 10–15)
GAMMA INTERFERON BACKGROUND BLD IA-ACNC: 0 IU/ML
GFR SERPL CREATININE-BSD FRML MDRD: >90 ML/MIN/1.73M2
GLUCOSE BLD-MCNC: 160 MG/DL (ref 70–99)
GLUCOSE BLDC GLUCOMTR-MCNC: 138 MG/DL (ref 70–99)
GLUCOSE BLDC GLUCOMTR-MCNC: 142 MG/DL (ref 70–99)
GLUCOSE BLDC GLUCOMTR-MCNC: 142 MG/DL (ref 70–99)
GLUCOSE BLDC GLUCOMTR-MCNC: 165 MG/DL (ref 70–99)
GLUCOSE BLDC GLUCOMTR-MCNC: 268 MG/DL (ref 70–99)
HCT VFR BLD AUTO: 29.7 % (ref 40–53)
HCV AB SERPL QL IA: NONREACTIVE
HGB BLD-MCNC: 10.4 G/DL (ref 13.3–17.7)
HGB BLD-MCNC: 10.6 G/DL (ref 13.3–17.7)
HGB BLD-MCNC: 9.6 G/DL (ref 13.3–17.7)
HSV1 IGG SERPL QL IA: 2.08 INDEX
HSV1 IGG SERPL QL IA: ABNORMAL
HSV2 IGG SERPL QL IA: >23.6 INDEX
HSV2 IGG SERPL QL IA: ABNORMAL
IGA SERPL-MCNC: 103 MG/DL (ref 84–499)
IGE SERPL-ACNC: 83 KU/L (ref 0–114)
IGG SERPL-MCNC: 363 MG/DL (ref 610–1616)
IGM SERPL-MCNC: 51 MG/DL (ref 35–242)
INR PPP: 1.19 (ref 0.85–1.15)
LACTATE SERPL-SCNC: 1.1 MMOL/L (ref 0.7–2)
M TB IFN-G BLD-IMP: ABNORMAL
M TB IFN-G CD4+ BCKGRND COR BLD-ACNC: 0.25 IU/ML
MCH RBC QN AUTO: 27.8 PG (ref 26.5–33)
MCHC RBC AUTO-ENTMCNC: 32.3 G/DL (ref 31.5–36.5)
MCV RBC AUTO: 86 FL (ref 78–100)
MITOGEN IGNF BCKGRD COR BLD-ACNC: 0 IU/ML
MITOGEN IGNF BCKGRD COR BLD-ACNC: 0.01 IU/ML
OXYHGB MFR BLDV: 69 % (ref 92–100)
OXYHGB MFR BLDV: 88 % (ref 92–100)
PLATELET # BLD AUTO: 126 10E3/UL (ref 150–450)
POTASSIUM BLD-SCNC: 4.3 MMOL/L (ref 3.4–5.3)
PREALB SERPL IA-MCNC: 40 MG/DL (ref 15–45)
QUANTIFERON MITOGEN: 0.25 IU/ML
QUANTIFERON NIL TUBE: 0 IU/ML
QUANTIFERON TB1 TUBE: 0 IU/ML
QUANTIFERON TB2 TUBE: 0.01
RBC # BLD AUTO: 3.45 10E6/UL (ref 4.4–5.9)
SODIUM SERPL-SCNC: 138 MMOL/L (ref 133–144)
SPECIMEN EXPIRATION DATE: NORMAL
T GONDII IGG SER QL: <3 IU/ML (ref 0–7.1)
U1 SNRNP IGG SER IA-ACNC: <1.1 U/ML
U1 SNRNP IGG SER IA-ACNC: NEGATIVE
VZV IGG SER QL IA: 1106 INDEX
VZV IGG SER QL IA: POSITIVE
WBC # BLD AUTO: 23 10E3/UL (ref 4–11)

## 2021-09-08 PROCEDURE — 82657 ENZYME CELL ACTIVITY: CPT | Performed by: INTERNAL MEDICINE

## 2021-09-08 PROCEDURE — 250N000009 HC RX 250: Performed by: INTERNAL MEDICINE

## 2021-09-08 PROCEDURE — 250N000013 HC RX MED GY IP 250 OP 250 PS 637: Performed by: PHYSICIAN ASSISTANT

## 2021-09-08 PROCEDURE — 99233 SBSQ HOSP IP/OBS HIGH 50: CPT | Mod: GC | Performed by: STUDENT IN AN ORGANIZED HEALTH CARE EDUCATION/TRAINING PROGRAM

## 2021-09-08 PROCEDURE — C1887 CATHETER, GUIDING: HCPCS | Performed by: INTERNAL MEDICINE

## 2021-09-08 PROCEDURE — 82810 BLOOD GASES O2 SAT ONLY: CPT

## 2021-09-08 PROCEDURE — 80048 BASIC METABOLIC PNL TOTAL CA: CPT | Performed by: STUDENT IN AN ORGANIZED HEALTH CARE EDUCATION/TRAINING PROGRAM

## 2021-09-08 PROCEDURE — 4A023N6 MEASUREMENT OF CARDIAC SAMPLING AND PRESSURE, RIGHT HEART, PERCUTANEOUS APPROACH: ICD-10-PCS | Performed by: INTERNAL MEDICINE

## 2021-09-08 PROCEDURE — 999N000185 HC STATISTIC TRANSPORT TIME EA 15 MIN

## 2021-09-08 PROCEDURE — 99222 1ST HOSP IP/OBS MODERATE 55: CPT | Performed by: SURGERY

## 2021-09-08 PROCEDURE — 85610 PROTHROMBIN TIME: CPT | Performed by: STUDENT IN AN ORGANIZED HEALTH CARE EDUCATION/TRAINING PROGRAM

## 2021-09-08 PROCEDURE — 82955 ASSAY OF G6PD ENZYME: CPT | Performed by: INTERNAL MEDICINE

## 2021-09-08 PROCEDURE — 93456 R HRT CORONARY ARTERY ANGIO: CPT | Performed by: INTERNAL MEDICINE

## 2021-09-08 PROCEDURE — 250N000011 HC RX IP 250 OP 636: Performed by: PHYSICIAN ASSISTANT

## 2021-09-08 PROCEDURE — 99233 SBSQ HOSP IP/OBS HIGH 50: CPT | Mod: 25 | Performed by: INTERNAL MEDICINE

## 2021-09-08 PROCEDURE — 120N000003 HC R&B IMCU UMMC

## 2021-09-08 PROCEDURE — G0463 HOSPITAL OUTPT CLINIC VISIT: HCPCS

## 2021-09-08 PROCEDURE — 258N000003 HC RX IP 258 OP 636: Performed by: NURSE PRACTITIONER

## 2021-09-08 PROCEDURE — 250N000011 HC RX IP 250 OP 636: Performed by: INTERNAL MEDICINE

## 2021-09-08 PROCEDURE — 85027 COMPLETE CBC AUTOMATED: CPT | Performed by: STUDENT IN AN ORGANIZED HEALTH CARE EDUCATION/TRAINING PROGRAM

## 2021-09-08 PROCEDURE — 272N000001 HC OR GENERAL SUPPLY STERILE: Performed by: INTERNAL MEDICINE

## 2021-09-08 PROCEDURE — 82085 ASSAY OF ALDOLASE: CPT | Performed by: STUDENT IN AN ORGANIZED HEALTH CARE EDUCATION/TRAINING PROGRAM

## 2021-09-08 PROCEDURE — C1894 INTRO/SHEATH, NON-LASER: HCPCS | Performed by: INTERNAL MEDICINE

## 2021-09-08 PROCEDURE — 250N000013 HC RX MED GY IP 250 OP 250 PS 637: Performed by: NURSE PRACTITIONER

## 2021-09-08 PROCEDURE — 272N000002 HC OR SUPPLY OTHER OPNP: Performed by: INTERNAL MEDICINE

## 2021-09-08 PROCEDURE — 36592 COLLECT BLOOD FROM PICC: CPT | Performed by: INTERNAL MEDICINE

## 2021-09-08 PROCEDURE — 999N000157 HC STATISTIC RCP TIME EA 10 MIN

## 2021-09-08 PROCEDURE — 94660 CPAP INITIATION&MGMT: CPT

## 2021-09-08 PROCEDURE — 86803 HEPATITIS C AB TEST: CPT | Performed by: INTERNAL MEDICINE

## 2021-09-08 PROCEDURE — 250N000013 HC RX MED GY IP 250 OP 250 PS 637: Performed by: STUDENT IN AN ORGANIZED HEALTH CARE EDUCATION/TRAINING PROGRAM

## 2021-09-08 PROCEDURE — 86901 BLOOD TYPING SEROLOGIC RH(D): CPT | Performed by: INTERNAL MEDICINE

## 2021-09-08 PROCEDURE — 85018 HEMOGLOBIN: CPT

## 2021-09-08 PROCEDURE — 99356 PR PROLONGED SERV,INPATIENT,1ST HR: CPT | Performed by: INTERNAL MEDICINE

## 2021-09-08 PROCEDURE — 99152 MOD SED SAME PHYS/QHP 5/>YRS: CPT | Performed by: INTERNAL MEDICINE

## 2021-09-08 PROCEDURE — B2111ZZ FLUOROSCOPY OF MULTIPLE CORONARY ARTERIES USING LOW OSMOLAR CONTRAST: ICD-10-PCS | Performed by: INTERNAL MEDICINE

## 2021-09-08 PROCEDURE — 250N000011 HC RX IP 250 OP 636: Performed by: STUDENT IN AN ORGANIZED HEALTH CARE EDUCATION/TRAINING PROGRAM

## 2021-09-08 PROCEDURE — 83605 ASSAY OF LACTIC ACID: CPT | Performed by: INTERNAL MEDICINE

## 2021-09-08 PROCEDURE — 36592 COLLECT BLOOD FROM PICC: CPT | Performed by: STUDENT IN AN ORGANIZED HEALTH CARE EDUCATION/TRAINING PROGRAM

## 2021-09-08 RX ORDER — FENTANYL CITRATE 50 UG/ML
INJECTION, SOLUTION INTRAMUSCULAR; INTRAVENOUS
Status: DISCONTINUED | OUTPATIENT
Start: 2021-09-08 | End: 2021-09-08 | Stop reason: HOSPADM

## 2021-09-08 RX ORDER — NALOXONE HYDROCHLORIDE 0.4 MG/ML
0.2 INJECTION, SOLUTION INTRAMUSCULAR; INTRAVENOUS; SUBCUTANEOUS
Status: ACTIVE | OUTPATIENT
Start: 2021-09-08 | End: 2021-09-09

## 2021-09-08 RX ORDER — FLUMAZENIL 0.1 MG/ML
0.2 INJECTION, SOLUTION INTRAVENOUS
Status: ACTIVE | OUTPATIENT
Start: 2021-09-08 | End: 2021-09-09

## 2021-09-08 RX ORDER — FENTANYL CITRATE 50 UG/ML
25 INJECTION, SOLUTION INTRAMUSCULAR; INTRAVENOUS
Status: DISCONTINUED | OUTPATIENT
Start: 2021-09-08 | End: 2021-09-16

## 2021-09-08 RX ORDER — PROTAMINE SULFATE 10 MG/ML
INJECTION, SOLUTION INTRAVENOUS
Status: DISCONTINUED | OUTPATIENT
Start: 2021-09-08 | End: 2021-09-08 | Stop reason: HOSPADM

## 2021-09-08 RX ORDER — TIROFIBAN HYDROCHLORIDE 50 UG/ML
0.15 INJECTION INTRAVENOUS CONTINUOUS PRN
Status: DISCONTINUED | OUTPATIENT
Start: 2021-09-08 | End: 2021-09-08 | Stop reason: HOSPADM

## 2021-09-08 RX ORDER — OXYCODONE HYDROCHLORIDE 10 MG/1
10 TABLET ORAL EVERY 4 HOURS PRN
Status: DISCONTINUED | OUTPATIENT
Start: 2021-09-08 | End: 2021-10-16

## 2021-09-08 RX ORDER — EPTIFIBATIDE 2 MG/ML
2 INJECTION, SOLUTION INTRAVENOUS CONTINUOUS PRN
Status: DISCONTINUED | OUTPATIENT
Start: 2021-09-08 | End: 2021-09-08 | Stop reason: HOSPADM

## 2021-09-08 RX ORDER — NITROGLYCERIN 20 MG/100ML
10-200 INJECTION INTRAVENOUS CONTINUOUS PRN
Status: DISCONTINUED | OUTPATIENT
Start: 2021-09-08 | End: 2021-09-08 | Stop reason: HOSPADM

## 2021-09-08 RX ORDER — IOPAMIDOL 755 MG/ML
INJECTION, SOLUTION INTRAVASCULAR
Status: DISCONTINUED | OUTPATIENT
Start: 2021-09-08 | End: 2021-09-08 | Stop reason: HOSPADM

## 2021-09-08 RX ORDER — ATROPINE SULFATE 0.1 MG/ML
0.5 INJECTION INTRAVENOUS
Status: ACTIVE | OUTPATIENT
Start: 2021-09-08 | End: 2021-09-09

## 2021-09-08 RX ORDER — EPTIFIBATIDE 2 MG/ML
180 INJECTION, SOLUTION INTRAVENOUS EVERY 10 MIN PRN
Status: DISCONTINUED | OUTPATIENT
Start: 2021-09-08 | End: 2021-09-08 | Stop reason: HOSPADM

## 2021-09-08 RX ORDER — HEPARIN SODIUM 10000 [USP'U]/100ML
100-1000 INJECTION, SOLUTION INTRAVENOUS CONTINUOUS PRN
Status: DISCONTINUED | OUTPATIENT
Start: 2021-09-08 | End: 2021-09-08 | Stop reason: HOSPADM

## 2021-09-08 RX ORDER — OXYCODONE HYDROCHLORIDE 5 MG/1
5 TABLET ORAL EVERY 4 HOURS PRN
Status: DISCONTINUED | OUTPATIENT
Start: 2021-09-08 | End: 2021-10-16

## 2021-09-08 RX ORDER — ARGATROBAN 1 MG/ML
150 INJECTION, SOLUTION INTRAVENOUS
Status: DISCONTINUED | OUTPATIENT
Start: 2021-09-08 | End: 2021-09-08 | Stop reason: HOSPADM

## 2021-09-08 RX ORDER — DOPAMINE HYDROCHLORIDE 160 MG/100ML
2-20 INJECTION, SOLUTION INTRAVENOUS CONTINUOUS PRN
Status: DISCONTINUED | OUTPATIENT
Start: 2021-09-08 | End: 2021-09-08 | Stop reason: HOSPADM

## 2021-09-08 RX ORDER — SODIUM CHLORIDE 9 MG/ML
75 INJECTION, SOLUTION INTRAVENOUS CONTINUOUS
Status: ACTIVE | OUTPATIENT
Start: 2021-09-08 | End: 2021-09-08

## 2021-09-08 RX ORDER — DOBUTAMINE HYDROCHLORIDE 200 MG/100ML
2-20 INJECTION INTRAVENOUS CONTINUOUS PRN
Status: DISCONTINUED | OUTPATIENT
Start: 2021-09-08 | End: 2021-09-08 | Stop reason: HOSPADM

## 2021-09-08 RX ORDER — NICARDIPINE HYDROCHLORIDE 2.5 MG/ML
INJECTION INTRAVENOUS
Status: DISCONTINUED | OUTPATIENT
Start: 2021-09-08 | End: 2021-09-08 | Stop reason: HOSPADM

## 2021-09-08 RX ORDER — HEPARIN SODIUM 1000 [USP'U]/ML
INJECTION, SOLUTION INTRAVENOUS; SUBCUTANEOUS
Status: DISCONTINUED | OUTPATIENT
Start: 2021-09-08 | End: 2021-09-08 | Stop reason: HOSPADM

## 2021-09-08 RX ORDER — ARGATROBAN 1 MG/ML
350 INJECTION, SOLUTION INTRAVENOUS
Status: DISCONTINUED | OUTPATIENT
Start: 2021-09-08 | End: 2021-09-08 | Stop reason: HOSPADM

## 2021-09-08 RX ORDER — NALOXONE HYDROCHLORIDE 0.4 MG/ML
0.4 INJECTION, SOLUTION INTRAMUSCULAR; INTRAVENOUS; SUBCUTANEOUS
Status: ACTIVE | OUTPATIENT
Start: 2021-09-08 | End: 2021-09-09

## 2021-09-08 RX ORDER — NITROGLYCERIN 5 MG/ML
VIAL (ML) INTRAVENOUS
Status: DISCONTINUED | OUTPATIENT
Start: 2021-09-08 | End: 2021-09-08 | Stop reason: HOSPADM

## 2021-09-08 RX ORDER — ASPIRIN 325 MG
325 TABLET ORAL ONCE
Status: COMPLETED | OUTPATIENT
Start: 2021-09-08 | End: 2021-09-08

## 2021-09-08 RX ADMIN — SODIUM CHLORIDE 75 ML/HR: 9 INJECTION, SOLUTION INTRAVENOUS at 18:07

## 2021-09-08 RX ADMIN — HEPARIN SODIUM 5000 UNITS: 5000 INJECTION, SOLUTION INTRAVENOUS; SUBCUTANEOUS at 08:12

## 2021-09-08 RX ADMIN — Medication 3 MG: at 20:09

## 2021-09-08 RX ADMIN — AZITHROMYCIN MONOHYDRATE 250 MG: 250 TABLET ORAL at 08:15

## 2021-09-08 RX ADMIN — METHYLPREDNISOLONE SODIUM SUCCINATE 125 MG: 125 INJECTION, POWDER, FOR SOLUTION INTRAMUSCULAR; INTRAVENOUS at 03:27

## 2021-09-08 RX ADMIN — AMLODIPINE BESYLATE 5 MG: 5 TABLET ORAL at 08:14

## 2021-09-08 RX ADMIN — HEPARIN SODIUM 5000 UNITS: 5000 INJECTION, SOLUTION INTRAVENOUS; SUBCUTANEOUS at 20:08

## 2021-09-08 RX ADMIN — METHYLPREDNISOLONE SODIUM SUCCINATE 125 MG: 125 INJECTION, POWDER, FOR SOLUTION INTRAMUSCULAR; INTRAVENOUS at 08:15

## 2021-09-08 RX ADMIN — LEVOTHYROXINE SODIUM 25 MCG: 0.03 TABLET ORAL at 08:14

## 2021-09-08 RX ADMIN — CEFEPIME HYDROCHLORIDE 2 G: 2 INJECTION, POWDER, FOR SOLUTION INTRAVENOUS at 12:58

## 2021-09-08 RX ADMIN — FLUCONAZOLE 400 MG: 2 INJECTION, SOLUTION INTRAVENOUS at 20:08

## 2021-09-08 RX ADMIN — METHYLPREDNISOLONE SODIUM SUCCINATE 125 MG: 125 INJECTION, POWDER, FOR SOLUTION INTRAMUSCULAR; INTRAVENOUS at 15:14

## 2021-09-08 RX ADMIN — INSULIN GLARGINE 5 UNITS: 100 INJECTION, SOLUTION SUBCUTANEOUS at 08:12

## 2021-09-08 RX ADMIN — PANTOPRAZOLE SODIUM 40 MG: 40 TABLET, DELAYED RELEASE ORAL at 15:37

## 2021-09-08 RX ADMIN — SULFAMETHOXAZOLE AND TRIMETHOPRIM 3 TABLET: 800; 160 TABLET ORAL at 15:14

## 2021-09-08 RX ADMIN — SULFAMETHOXAZOLE AND TRIMETHOPRIM 3 TABLET: 800; 160 TABLET ORAL at 08:14

## 2021-09-08 RX ADMIN — CEFEPIME HYDROCHLORIDE 2 G: 2 INJECTION, POWDER, FOR SOLUTION INTRAVENOUS at 20:08

## 2021-09-08 RX ADMIN — PANTOPRAZOLE SODIUM 40 MG: 40 TABLET, DELAYED RELEASE ORAL at 08:15

## 2021-09-08 RX ADMIN — METHYLPREDNISOLONE SODIUM SUCCINATE 125 MG: 125 INJECTION, POWDER, FOR SOLUTION INTRAMUSCULAR; INTRAVENOUS at 20:08

## 2021-09-08 RX ADMIN — ASPIRIN 325 MG ORAL TABLET 325 MG: 325 PILL ORAL at 15:37

## 2021-09-08 RX ADMIN — CEFEPIME HYDROCHLORIDE 2 G: 2 INJECTION, POWDER, FOR SOLUTION INTRAVENOUS at 04:22

## 2021-09-08 RX ADMIN — FLUTICASONE FUROATE 1 PUFF: 100 POWDER RESPIRATORY (INHALATION) at 08:12

## 2021-09-08 RX ADMIN — SULFAMETHOXAZOLE AND TRIMETHOPRIM 3 TABLET: 800; 160 TABLET ORAL at 20:08

## 2021-09-08 RX ADMIN — ESCITALOPRAM OXALATE 5 MG: 5 TABLET, FILM COATED ORAL at 20:08

## 2021-09-08 ASSESSMENT — MIFFLIN-ST. JEOR: SCORE: 1428.2

## 2021-09-08 ASSESSMENT — ACTIVITIES OF DAILY LIVING (ADL)
ADLS_ACUITY_SCORE: 19

## 2021-09-08 NOTE — PLAN OF CARE
Neuro: A&Ox4.   Cardiac: SR. VSS. B/P: 125/81, T: 97.9, P: 63, R: 25   Respiratory: Sating >88% (goal) on % HFNC / 55-65% Bipap.  GI/: Adequate urine output via urinal. BM X1 via bedpan  Diet/appetite: NPO since MN for R heart cath.  Activity:  Turns and repositions self independently in bed. Desats with activity  Pain: At acceptable level on current regimen.   Skin: No new deficits noted. Gecko pad in place on bridge of nose. Scrotal redness - WOC consult in place.  LDA's: R PICC    Lung transplant workup  R heart cath 9/8 - NPO since MN    Plan: Continue with POC. Notify primary team with changes.

## 2021-09-08 NOTE — CONSULTS
Focus- BL cheeks, coccyx, sacrum, BL buttocks and scrotal area  Received consult for suspected pressure injury on BL cheeks. Assessed the area and noted blanchable erythema on BL cheeks (not related to BI-pap, entire cheek is erythematous). Scrotal area also erythematous r/t moisture/perspiration. No pressure injury detected. Able to shift weight from side to side. Educated pt on pressure injury, risk factors, and preventive measures. Verbalized understanding.  P. Continue to follow pressure injury prevention protocol. Apply no sting film to keep the area dry. No further visit planned, will sign off.

## 2021-09-08 NOTE — PROGRESS NOTES
[facility]    Hospitalist Progress Note    Date of Service (when I saw the patient): 09/08/2021    Assessment & Plan   Edson Thornton is a 56 year old male with history of NSIP associated with rheumatoid arthritis and traction bronchiectasis who was admitted on 9/5/2021 with acute on chronic hypoxic respiratory failure .  The patient is oxygen dependent.     Acute hypoxic respiratory failure  Per pulmonary recommendation, BiPAP was used at night with oxygen saturations up to 96% at 100% FiO2. It is interchanged with high flow nc oxygen during the day. We will continue treatment with IV methylprednisolone 125mg q6h till patient's oxygen requirements to 4-6LPM and then taper. Inhaled fluticasone daily. On PCP prophylaxis with TMP/SMX. Yesterday's high resolution CT scan demonstrated bilateral ground glass opacities and mediastinal lymphadenopathy. Infectious work-up has been so far negative, TB assays are still pending. On empiric treatment with 2g IV cefepime, 400mg fluconazole and 250mg azithromycin.    Lung transplant evaluation  Patient was assessed by cardiothoracic surgery who recommended lung transplant. The patient verbalized understanding of the risks and benefits and affirmed willingness to proceed. A V/P scan was done that demonstrated 53% contribution by the right lung, 47% by the left lung, no acute filling defect. An echocardiogram was done showing normal right and left ventricular function with a calculated PAP of 40 mm Hg. A RHC with coronary angiogram will be done to evaluate for pulm. hypertension.    Rheumatoid arthritis  Patient seen by rheumatology yesterday. No acute flare of rheumatoid arthritis. Rheumatology does not recommend an additional treatment to methlyprednisolone at  this time, appreciate recs. Additional laboratory work-up with Irene-1, aldolase, CK, CD19 antibodies and SINCERE panel has been ordered and pending.      Steroid induced  diabetes  Patient treated with insulin sliding scale and morning glargine. Glucose ranged between 151 and 189 mg/dL in the last 24 hours. Continue the current regimen with a goal of less than 200mg/dL and prevention of hypoglycemia.     Hypothyroidism   Continue 25 mg levothyroxin qday    SALTY  Continue BiPAP at night.     Hypertension  On 5mg amlodipine, borderline hypertension at 142mg SBP this morning.    Anxiety  Depression  Continue 5mg escitalopram      DVT Prophylaxis: UFH 5000U, q12h  Code Status: Full Code    Disposition: Expected discharge in 6 days once work-up is completed and oxygen requirements lower.    Sherri Layne     Interval History:  No acute events overnight. BiPAP was used at night per pulmonary recommendation, high flow nc during the day. Last SaO2 90%, high flow nc O2 at 37LPM. Rest of vitals are stable. The patient reports no acute distress but has been unable to move around his room due to shortness of breath. No new complaints.    -Data reviewed today: I reviewed all new labs and imaging results over the last 24 hours. CBC is downtrending from 26 to 23. Oxygen changed from NC high flow to BiPAP, now at 88-94% with 65% FiO2. BP stable, mildly hypertensive, glucose controlled, 160-189. Infectious work-up so far negative, TB pending.       Physical Exam   Temp: 97.5  F (36.4  C) Temp src: Axillary BP: (!) 142/82 Pulse: 74   Resp: 30 SpO2: (!) 88 % O2 Device: BiPAP/CPAP Oxygen Delivery: 35 LPM  Vitals:    09/06/21 0340 09/07/21 0400 09/08/21 0626   Weight: 72.9 kg (160 lb 11.5 oz) 72.6 kg (160 lb) 68.7 kg (151 lb 8 oz)     Vital Signs with Ranges  Temp:  [97.5  F (36.4  C)-98  F (36.7  C)] 97.5  F (36.4  C)  Pulse:  [] 74  Resp:  [20-30] 30  BP: (125-157)/() 142/82  FiO2 (%):  [55 %-100 %] 65 %  SpO2:  [88 %-96 %] 88 %  I/O last 3 completed shifts:  In: 1020 [P.O.:1000; I.V.:20]  Out: 1450 [Urine:1450]    Constitutional: Increased work of breathing, AAOx3, NAD  Respiratory:  On high flow nc oxygen, coarse breathing sounds with diffuse crackles on both lung fields  Cardiovascular: RRR, no r/m/g, S1, S2  GI: Diffuse ecchymosis at the sites of heparin injection, no tenderness, no rebound, no guarding  Skin/Integumen: Trace lower extremity edema, no joint swelling or reduced range of motion.    Medications     - MEDICATION INSTRUCTIONS -       - MEDICATION INSTRUCTIONS -         amLODIPine  5 mg Oral Daily     azithromycin  250 mg Oral Daily     ceFEPIme (MAXIPIME) IV  2 g Intravenous Q8H     escitalopram  5 mg Oral QPM     fluconazole  400 mg Intravenous Q24H     fluticasone  1 puff Inhalation Daily     heparin ANTICOAGULANT  5,000 Units Subcutaneous Q12H     insulin aspart   Subcutaneous TID AC     insulin aspart  1-7 Units Subcutaneous TID AC     insulin aspart  1-5 Units Subcutaneous At Bedtime     insulin glargine  5 Units Subcutaneous QAM AC     levothyroxine  25 mcg Oral QAM AC     melatonin  3 mg Oral At Bedtime     methylPREDNISolone  125 mg Intravenous Q6H     pantoprazole  40 mg Oral BID AC     sodium chloride (PF)  3 mL Intracatheter Q8H     sulfamethoxazole-trimethoprim  3 tablet Oral TID       Data   Recent Labs   Lab 09/08/21  0743 09/08/21  0626 09/07/21  2202 09/07/21  1324 09/07/21  1100 09/07/21  0928 09/06/21  0633   WBC  --  23.0*  --  27.9*  --  27.2*  --    HGB  --  9.6*  --  10.9*  --  10.5*  --    MCV  --  86  --  88  --  86  --    PLT  --  126*  --  153  --  153  --    INR  --  1.19*  --  1.16*  --   --   --    NA  --  138  --  138 139  --   --    POTASSIUM  --  4.3  --  3.9 3.9  --   --    CHLORIDE  --  107  --  107 108  --   --    CO2  --  26  --  25 24  --   --    BUN  --  22  --  23 22  --   --    CR  --  0.76  --  0.69 0.73  --   --    ANIONGAP  --  5  --  6 7  --   --    ERIK  --  8.6  --  8.8 9.1  --   --    * 160* 189* 151* 158*  --    < >   ALBUMIN  --   --   --  2.4*  --   --   --    PROTTOTAL  --   --   --  5.6*  --   --   --    BILITOTAL  --   --    --  0.2  --   --   --    ALKPHOS  --   --   --  67  --   --   --    ALT  --   --   --  39  --   --   --    AST  --   --   --  30  --   --   --     < > = values in this interval not displayed.       Recent Results (from the past 24 hour(s))   Echocardiogram Complete   Result Value    LVEF  60-65%    [facility]    [id number]  [id number]  [id number]  [id number]^JESSY^VANNESA^AMADO     [facility],[address]  Echocardiography Laboratory  [address] [place] [address]     Name: SHAYNA GUERRIER  MRN: [id number]  : 1965  Study Date: 2021 01:32 PM  Age: 56 yrs  Gender: Male  Patient Location: [facility]  Reason For Study: Lung transplant  Ordering Physician: VANNESA JIMÉNEZ  Referring Physician: SAUNDRA GILL  Performed By: Ayaka Tong RDCS     BSA: 1.8 m2  Height: 64 in  Weight: 160 lb  HR: 74  BP: 133/86 mmHg  ______________________________________________________________________________  Procedure  Bubble Echocardiogram with two-dimensional, color and spectral Doppler  performed. Echocardiogram with two-dimensional, color and spectral Doppler  performed.  ______________________________________________________________________________  Interpretation Summary  Global and regional left ventricular function is normal with an EF of 60-65%.  Global right ventricular function is normal.  No significant valvular abnormalities present.  The patent foramen ovale was demonstrated by color Doppler .  Estimated pulmonary artery systolic pressure is 40 mmHg. Pulmonary  hypertension is present.  The inferior vena cava was normal in size with preserved respiratory  variability.  No pericardial effusion is present.  ______________________________________________________________________________  Left Ventricle  Left ventricular size is normal. Left ventricular wall thickness is normal.  Global and regional left ventricular function is normal with an EF of 60-65%.  Left ventricular diastolic  function is normal. No regional wall motion  abnormalities are seen.     Right Ventricle  The right ventricle is normal size. Global right ventricular function is  normal.     Atria  The right atria appears normal. Mild left atrial enlargement is present. The  patent foramen ovale was demonstrated by color Doppler .     Mitral Valve  The mitral valve is normal. Trace mitral insufficiency is present.     Aortic Valve  Aortic valve is normal in structure and function.     Tricuspid Valve  Trace to mild tricuspid insufficiency is present. Estimated pulmonary artery  systolic pressure is 37 mmHg plus right atrial pressure. Pulmonary  hypertension is present.     Pulmonic Valve  The pulmonic valve is normal.     Vessels  The aorta root is normal. The inferior vena cava was normal in size with  preserved respiratory variability.     Pericardium  No pericardial effusion is present.     Miscellaneous  No significant valvular abnormalities present.     Compared to Previous Study  Previous study not available for comparison.  ______________________________________________________________________________  MMode/2D Measurements & Calculations  IVSd: 0.95 cm  LVIDd: 4.6 cm  LVIDs: 2.7 cm  LVPWd: 0.92 cm  FS: 41.8 %  LV mass(C)d: 144.4 grams  LV mass(C)dI: 81.2 grams/m2  Ao root diam: 3.0 cm  asc Aorta Diam: 3.2 cm  LVOT diam: 2.0 cm  LVOT area: 3.1 cm2  LA Volume (BP): 63.3 ml     LA Volume Index (BP): 35.6 ml/m2  RWT: 0.40     Doppler Measurements & Calculations  MV E max vivi: 109.0 cm/sec  MV A max vivi: 69.4 cm/sec  MV E/A: 1.6  MV dec slope: 695.0 cm/sec2  PA acc time: 0.08 sec  TR max vivi: 280.5 cm/sec  TR max P.7 mmHg  E/E' av.8  Lateral E/e': 8.8  Medial E/e': 10.9     ______________________________________________________________________________  Report approved by: Jazmin Alonzo 2021 02:34 PM         NM Lung Scan Perfusion Particulate    Narrative    Examination: NM LUNG SCAN PERFUSION PARTICULATE        Date:  9/7/2021 2:41 PM     Indication: Urgent lung transplant evaluation     Previous Study: none    Additional Information: Acute on chronic hypoxic respiratory failure  in the setting of interstitial lung disease and rheumatoid lung  disease    Technique:    The patient received 7.2 mCi of Tc-99m labeled MAA intravenously.  Anterior and posterior quantitative views were obtained of the lungs  using a dual headed camera camera. A standard eight view lung  perfusion scan was also obtained. Calculations were performed using  the geometric mean technique.    Findings:    There were no previous studies for comparison.     The perfusion images demonstrate no significant focal perfusion  defect. There is mildly heterogenous uptake in the upper lobes which  is likely related to the acute lung process seen by radiography.    The quantitative evaluation shows that there is 53% function on the  right as compared to 47% function on the left.     Within the right lung there is approximately 10% in the upper 1/3, 23%  in the middle 1/3, and 20% in the lower 1/3.     Within the left lung there is approximately 11% in the upper 1/3, 20%  in the middle 1/3, and 17% in the lower 1/3.      Impression    Impression:  Quantitative evaluation shows 53% contribution of the right lung as  compared to 47% contribution of the left lung.    I have personally reviewed the examination and initial interpretation  and I agree with the findings.    FREDDY LEAHY MD         SYSTEM ID:  [id number]   XR Chest 1 View    Narrative    EXAM: XR CHEST 1 VIEW  9/7/2021 3:32 PM      HISTORY: Assess lung size    COMPARISON: Chest x-ray 9/5/2021, nuclear medicine perfusion scan  9/7/2021, chest CT 8/30/2021    FINDINGS: Semiupright AP radiograph of the chest. Right upper  extremity PICC tip projects over the high right atrium. The trachea is  midline. The cardiac silhouette is obscured. No pleural effusion or  pneumothorax. Diffuse bilateral mixed  interstitial and airspace  opacities are stable to slightly increased compared to prior. Average  to slightly decreased lung volumes when assessed subjectively on this  AP radiograph. The visualized upper abdomen is unremarkable.      Impression    IMPRESSION:   1. Diffuse bilateral mixed interstitial/fibrotic and airspace  opacities are stable to slightly increased compared to prior.  2. Average to slightly decreased volume of the lungs. Please see  same-day nuclear medicine perfusion scan for details regarding lung  perfusion.    I have personally reviewed the examination and initial interpretation  and I agree with the findings.    ANUPAM GASTON DO         SYSTEM ID:  [id number]   XR Chest 2 Views    Narrative    EXAM: XR CHEST 2 VW  9/7/2021 3:33 PM      HISTORY: Lung transplant consult    COMPARISON: Chest x-ray 9/7/2021 (14:49)    FINDINGS: AP and lateral radiographs of the chest. Right upper  extremity PICC tip projects over the high right atrium. The trachea is  midline. The cardiac silhouette is partially obscured. No pleural  effusion or pneumothorax. Extensive bilateral mixed interstitial and  airspace opacities, left greater than right. The visualized upper  abdomen is unremarkable. Chronic lateral right clavicle deformity.      Impression    IMPRESSION: Extensive bilateral mixed interstitial/fibrotic and  airspace opacities, left greater than right.    I have personally reviewed the examination and initial interpretation  and I agree with the findings.    ANUPAM GASTON DO         SYSTEM ID:  [id number]   XR Lumbar Spine 2/3 Views (aka XRAY)    Narrative    3 views lumbar spine radiographs 9/7/2021 3:33 PM    History: Lung transplant consult    Additional History from EMR: 56-year-old male with a past medical  history including rheumatoid arthritis and interstitial lung disease,  now under evaluation for lung transplantation.    Comparison: Same day radiographs of the thoracic spine, CT of  chest  8/30/2021    Findings:    Standing  AP, lateral including lumbosacral spot film lateral view  views of the lumbar spine were obtained. Obliquity of the lateral view  noted.    5  lumbar type vertebral bodies are assumed for the purpose of this  dictation.    There is no acute osseous abnormality.      Degenerative change of the lumbar spine with anterior osteophytosis.  Mild disc space narrowing and endplate sclerosis at L5-S1. Lumbar  facet arthropathy.    Mild degenerative changes of the sacroiliac joints. Partially  visualized hip joints appear congruent.    Multiple loops of mildly gas distended bowel. Gas pattern is  nonobstructive.      Impression    Impression:  1.  No compression fractures.  2.  Mild to moderate degenerative changes of the spine as detailed  above.    I have personally reviewed the examination and initial interpretation  and I agree with the findings.    MARY LOU FRIEDMAN MD         SYSTEM ID:  [id number]   XR Thoracic Spine 2 Views (aka XRAY)    Narrative    Exam: XR THORACIC SPINE 2 VIEWS, 9/7/2021 3:34 PM    Indication: Lung transplant consult    Comparison: Same day CT chest    Findings: AP and lateral radiographs of the thoracic spine were  obtained. Mild multilevel endplate degenerative changes of the  thoracic spine. No acute osseous findings. No subluxation. Right upper  extremity PICC terminating over the right atrium. Extensive  interstitial markings throughout the lungs.      Impression    Impression:   1. No acute findings involving the thoracic spine.  2. Diffuse interstitial markings throughout the lungs consistent with  history of ILD.    PREETI SMITH MD         SYSTEM ID:  [id number]   XR Pelvis w 1 View Each Hip    Narrative    EXAM: XR PELVIS AND HIP BILATERAL 1 VIEW  9/7/2021 3:34 PM      HISTORY: Lung transplant consult    COMPARISON: None    FINDINGS: AP and frog-leg lateral views of the pelvis.    No acute osseous abnormality. No radiographic evidence of  avascular  necrosis. Mild to moderate degenerative changes of the hips. Bones  appear osteopenic.    Soft tissues unremarkable.       Impression    IMPRESSION: Mild to moderate degenerative changes in the hips.    JAMES PADILLA MD (Joe)         SYSTEM ID:  [id number]   CT Chest Hi-Resolution wo Contrast    Narrative    EXAMINATION: CT CHEST HI-RESOLUTION WO CONTRAST, 9/7/2021 3:47 PM    TECHNIQUE:  Helical CT images from the thoracic inlet through the  upper abdomen were obtained without intravenous contrast, including  respiratory and expiratory images.  Images are displayed at 1 and 5 mm  intervals. Images reviewed in lung, soft tissue, and bone windows.    Radiation Dose (DLP): 594 mGy*cm    COMPARISON: Chest CT 8/30/2021    HISTORY: 57 y/o M h/o NSIP-ILD with concern for progression of  disease; CT chest high res to assess disease burden.    FINDINGS:    Lungs: The central tracheobronchial tree is patent. Small bilateral  pleural effusions. Extensive bilateral groundglass and  peribronchovascular reticular opacities appear slightly increased  compared to chest CT from 8/30/2021. Mild associated traction  bronchiectasis. No honeycombing. No significant air trapping on  expiratory images. No pneumothorax.    Mediastinum: Right upper extremity PICC tip in the right atrium. The  thyroid gland is atrophic. Mild atherosclerotic calcification of the  aortic arch. The heart is not enlarged. No significant pericardial  effusion. Mild coronary artery calcification. The ascending aorta and  main pulmonary artery are normal in caliber. Mediastinal  lymphadenopathy appears similar to prior, for example a 1.6 x 1.1 cm  right paratracheal lymph node (series 3, image 22), a 1.7 x 1.2 cm  right paratracheal lymph node (series 3, image 18), and a 1.6 x 1.2 cm  prevascular lymph node (series 3, image 22). Small sliding hiatal  hernia.    Upper abdomen: Partial fatty atrophy of the pancreas.    Bones/soft tissues: Degenerative  changes of the spine. No acute or  suspicious osseous lesions.      Impression    IMPRESSION:   1. Extensive bilateral groundglass and reticular opacities are  slightly increased compared to chest CT from 8/30/2021. This likely  represents progression of known NSIP, however superimposed infection  or cannot be excluded.  2. Small bilateral pleural effusions.  3. Stable mediastinal lymphadenopathy, likely reactive.    I have personally reviewed the examination and initial interpretation  and I agree with the findings.    ANUPAM GASTON,          SYSTEM ID:  [id number]       Sherri Layne MD

## 2021-09-08 NOTE — CARE CONFERENCE
Lung Transplant Consult Follow Up Note   September 8, 2021            Assessment and Plan:   Edson Thornton is a 56 year old male with ILD-RA admitted on 9/5/2021 for consideration of transplantation.    Proceed with standard transplantation evaluation. Right and left heart cath todat.  Will plan to present available work up data in Lung Transplant Selection Committee meeting tomorrow morning.  Anticipate lung Transplant Social Work eval tomorrow morning.      I discussed transplant at length with the patient (in person), his fiance, her daughter (virtual)  and the patient's parents (virtual) and Mallory Nieves, transplant coordinator.  We reviewed the risks and benefits of transplantation.  Our discussion included the recent survival statistics.  I discussed rejection, including hyperacute, acute, chronic and antibody mediated.  I discussed the need for surveillance biopsies.  I reviewed the standard immunosuppression and typical side effects, including renal failure.  I discussed the increased risk of infection and the use of prophylactic antibiotics. I reviewed the increased risk of malignancy. I discussed the listing system, including the Lung Allocation Score.  I discussed the typical operative and postoperative course.  I reviewed the usual clinic followup.  I explained to the patient that he is required to stay in the [facility] for three months following transplantation and that he  will need to have someone accompanying him during that time.  I discussed the importance of strict medication and clinic adherence after transplantation. The patient and his family had a number of questions which were answered to their apparent satisfaction.      60 minutes attending time required for transplant discussion.       Total Visit Time: 60 minutes  o For Outpatient, 'Total Visit Time' = Face-to-Face time only.  o For Inpatient, 'Total Visit Time' = Unit Floor + Face-to-Face time.    Total Face-to-Face Prolonged Service  Time: 60 minutes    Content of the Prolonged Time: see above      Abiodun Woods MD  [phone]            Interval History:   No events overnight  Dyspnea at rest: None with high flow O2  Dyspnea on exertion:  With minimal activity  Cough: intermittent  Sputum: minimal, swallowed  Hemoptysis: none   Chest Pain: None           Review of Systems:   C: NEGATIVE for fever, chills  ENT/MOUTH: no sore throat, new sinus pain or nasal drainage  RESP: see interval history  CV: NEGATIVE for chest pain, palpitations or peripheral edema  GI: no nausea, vomiting, change in stools  : no dysuria  MUSCULOSKELETAL: no myalgias, arthralgias  ENDOCRINE: blood sugars with adequate control  PSYCHIATRIC: mood stable          Medications:       amLODIPine  5 mg Oral Daily     azithromycin  250 mg Oral Daily     ceFEPIme (MAXIPIME) IV  2 g Intravenous Q8H     escitalopram  5 mg Oral QPM     fluconazole  400 mg Intravenous Q24H     fluticasone  1 puff Inhalation Daily     heparin ANTICOAGULANT  5,000 Units Subcutaneous Q12H     insulin aspart   Subcutaneous TID AC     insulin aspart  1-7 Units Subcutaneous TID AC     insulin aspart  1-5 Units Subcutaneous At Bedtime     insulin glargine  5 Units Subcutaneous QAM AC     levothyroxine  25 mcg Oral QAM AC     melatonin  3 mg Oral At Bedtime     methylPREDNISolone  125 mg Intravenous Q6H     pantoprazole  40 mg Oral BID AC     sodium chloride (PF)  3 mL Intracatheter Q8H     sulfamethoxazole-trimethoprim  3 tablet Oral TID     acetaminophen, benzocaine 20%, bisacodyl, artificial tears ophthalmic solution, glucose **OR** dextrose **OR** glucagon, ipratropium - albuterol 0.5 mg/2.5 mg/3 mL **OR** ipratropium - albuterol 0.5 mg/2.5 mg/3 mL, lidocaine 4%, lidocaine (buffered or not buffered), melatonin, - MEDICATION INSTRUCTIONS -, ondansetron **OR** ondansetron, - MEDICATION INSTRUCTIONS -, polyethylene glycol, sodium chloride (PF)         Physical Exam:   Temp:  [97.5  F (36.4  C)-98.5  F (36.9   C)] 98.5  F (36.9  C)  Pulse:  [63-89] 81  Resp:  [20-30] 24  BP: (125-157)/() 147/79  FiO2 (%):  [55 %-100 %] 90 %  SpO2:  [88 %-96 %] 91 %    Intake/Output Summary (Last 24 hours) at 9/8/2021 1422  Last data filed at 9/8/2021 1045  Gross per 24 hour   Intake 1210 ml   Output 2000 ml   Net -790 ml     Constitutional:   Awake, alert and in no apparent distress     Eyes:   nonicteric     ENT:    oral mucosa moist without lesions     Neck:   Supple without supraclavicular or cervical lymphadenopathy     Lungs:   Diminished air flow.  Fine mid and lower lung crackles. No rhonchi.  No wheezes.     Cardiovascular:   Normal S1 and S2.  RRR.  No murmur. No gallop. No rub.     Abdomen:   NABS, soft, nondistended, nontender.  No HSM.     Musculoskeletal:   (+) clubbing. No edema     Neurologic:   Alert and conversant.     Skin:   Warm, dry.  No rash on limited exam.             Data:   All laboratory and imaging data reviewed.    Results for orders placed or performed during the hospital encounter of 09/05/21 (from the past 24 hour(s))   NM Lung Scan Perfusion Particulate    Narrative    Examination: NM LUNG SCAN PERFUSION PARTICULATE       Date:  9/7/2021 2:41 PM     Indication: Urgent lung transplant evaluation     Previous Study: none    Additional Information: Acute on chronic hypoxic respiratory failure  in the setting of interstitial lung disease and rheumatoid lung  disease    Technique:    The patient received 7.2 mCi of Tc-99m labeled MAA intravenously.  Anterior and posterior quantitative views were obtained of the lungs  using a dual headed camera camera. A standard eight view lung  perfusion scan was also obtained. Calculations were performed using  the geometric mean technique.    Findings:    There were no previous studies for comparison.     The perfusion images demonstrate no significant focal perfusion  defect. There is mildly heterogenous uptake in the upper lobes which  is likely related to the  acute lung process seen by radiography.    The quantitative evaluation shows that there is 53% function on the  right as compared to 47% function on the left.     Within the right lung there is approximately 10% in the upper 1/3, 23%  in the middle 1/3, and 20% in the lower 1/3.     Within the left lung there is approximately 11% in the upper 1/3, 20%  in the middle 1/3, and 17% in the lower 1/3.      Impression    Impression:  Quantitative evaluation shows 53% contribution of the right lung as  compared to 47% contribution of the left lung.    I have personally reviewed the examination and initial interpretation  and I agree with the findings.    FREDDY LEAHY MD         SYSTEM ID:  [id number]   XR Chest/Heart Fluoro    Narrative    Examination:  XR CHEST/HEART FLUORO 9/7/2021 3:29 PM     Comparison: Chest x-ray 9/7/2021    History: Lung transplant consult    Findings: The diaphragm was evaluated during both quiet breathing and  sniffing in the AP view as well as left and right posterior oblique  views. The hemidiaphragms moved downward in a symmetric fashion both  when the patient's breathed normally and sniffed.      Impression    Impression: Negative sniff test.    I, FITO PERALES MD, attest that I was immediately available to  provide guidance and assistance during the entirety of the procedure.    I have personally reviewed the examination and initial interpretation  and I agree with the findings.    FITO PERALES MD         SYSTEM ID:  [id number]   XR Chest 1 View    Narrative    EXAM: XR CHEST 1 VIEW  9/7/2021 3:32 PM      HISTORY: Assess lung size    COMPARISON: Chest x-ray 9/5/2021, nuclear medicine perfusion scan  9/7/2021, chest CT 8/30/2021    FINDINGS: Semiupright AP radiograph of the chest. Right upper  extremity PICC tip projects over the high right atrium. The trachea is  midline. The cardiac silhouette is obscured. No pleural effusion or  pneumothorax. Diffuse bilateral mixed interstitial and  airspace  opacities are stable to slightly increased compared to prior. Average  to slightly decreased lung volumes when assessed subjectively on this  AP radiograph. The visualized upper abdomen is unremarkable.      Impression    IMPRESSION:   1. Diffuse bilateral mixed interstitial/fibrotic and airspace  opacities are stable to slightly increased compared to prior.  2. Average to slightly decreased volume of the lungs. Please see  same-day nuclear medicine perfusion scan for details regarding lung  perfusion.    I have personally reviewed the examination and initial interpretation  and I agree with the findings.    ANUPAM GASTON DO         SYSTEM ID:  [id number]   XR Chest 2 Views    Narrative    EXAM: XR CHEST 2 VW  9/7/2021 3:33 PM      HISTORY: Lung transplant consult    COMPARISON: Chest x-ray 9/7/2021 (14:49)    FINDINGS: AP and lateral radiographs of the chest. Right upper  extremity PICC tip projects over the high right atrium. The trachea is  midline. The cardiac silhouette is partially obscured. No pleural  effusion or pneumothorax. Extensive bilateral mixed interstitial and  airspace opacities, left greater than right. The visualized upper  abdomen is unremarkable. Chronic lateral right clavicle deformity.      Impression    IMPRESSION: Extensive bilateral mixed interstitial/fibrotic and  airspace opacities, left greater than right.    I have personally reviewed the examination and initial interpretation  and I agree with the findings.    ANUPAM GASTON DO         SYSTEM ID:  [id number]   XR Lumbar Spine 2/3 Views (aka XRAY)    Narrative    3 views lumbar spine radiographs 9/7/2021 3:33 PM    History: Lung transplant consult    Additional History from EMR: 56-year-old male with a past medical  history including rheumatoid arthritis and interstitial lung disease,  now under evaluation for lung transplantation.    Comparison: Same day radiographs of the thoracic spine, CT of  chest  8/30/2021    Findings:    Standing  AP, lateral including lumbosacral spot film lateral view  views of the lumbar spine were obtained. Obliquity of the lateral view  noted.    5  lumbar type vertebral bodies are assumed for the purpose of this  dictation.    There is no acute osseous abnormality.      Degenerative change of the lumbar spine with anterior osteophytosis.  Mild disc space narrowing and endplate sclerosis at L5-S1. Lumbar  facet arthropathy.    Mild degenerative changes of the sacroiliac joints. Partially  visualized hip joints appear congruent.    Multiple loops of mildly gas distended bowel. Gas pattern is  nonobstructive.      Impression    Impression:  1.  No compression fractures.  2.  Mild to moderate degenerative changes of the spine as detailed  above.    I have personally reviewed the examination and initial interpretation  and I agree with the findings.    MARY LOU FRIEDMAN MD         SYSTEM ID:  [id number]   XR Thoracic Spine 2 Views (aka XRAY)    Narrative    Exam: XR THORACIC SPINE 2 VIEWS, 9/7/2021 3:34 PM    Indication: Lung transplant consult    Comparison: Same day CT chest    Findings: AP and lateral radiographs of the thoracic spine were  obtained. Mild multilevel endplate degenerative changes of the  thoracic spine. No acute osseous findings. No subluxation. Right upper  extremity PICC terminating over the right atrium. Extensive  interstitial markings throughout the lungs.      Impression    Impression:   1. No acute findings involving the thoracic spine.  2. Diffuse interstitial markings throughout the lungs consistent with  history of ILD.    PREETI SMITH MD         SYSTEM ID:  [id number]   XR Pelvis w 1 View Each Hip    Narrative    EXAM: XR PELVIS AND HIP BILATERAL 1 VIEW  9/7/2021 3:34 PM      HISTORY: Lung transplant consult    COMPARISON: None    FINDINGS: AP and frog-leg lateral views of the pelvis.    No acute osseous abnormality. No radiographic evidence of  avascular  necrosis. Mild to moderate degenerative changes of the hips. Bones  appear osteopenic.    Soft tissues unremarkable.       Impression    IMPRESSION: Mild to moderate degenerative changes in the hips.    JAMES PADILLA MD (Joe)         SYSTEM ID:  [id number]   CT Chest Hi-Resolution wo Contrast    Narrative    EXAMINATION: CT CHEST HI-RESOLUTION WO CONTRAST, 9/7/2021 3:47 PM    TECHNIQUE:  Helical CT images from the thoracic inlet through the  upper abdomen were obtained without intravenous contrast, including  respiratory and expiratory images.  Images are displayed at 1 and 5 mm  intervals. Images reviewed in lung, soft tissue, and bone windows.    Radiation Dose (DLP): 594 mGy*cm    COMPARISON: Chest CT 8/30/2021    HISTORY: 55 y/o M h/o NSIP-ILD with concern for progression of  disease; CT chest high res to assess disease burden.    FINDINGS:    Lungs: The central tracheobronchial tree is patent. Small bilateral  pleural effusions. Extensive bilateral groundglass and  peribronchovascular reticular opacities appear slightly increased  compared to chest CT from 8/30/2021. Mild associated traction  bronchiectasis. No honeycombing. No significant air trapping on  expiratory images. No pneumothorax.    Mediastinum: Right upper extremity PICC tip in the right atrium. The  thyroid gland is atrophic. Mild atherosclerotic calcification of the  aortic arch. The heart is not enlarged. No significant pericardial  effusion. Mild coronary artery calcification. The ascending aorta and  main pulmonary artery are normal in caliber. Mediastinal  lymphadenopathy appears similar to prior, for example a 1.6 x 1.1 cm  right paratracheal lymph node (series 3, image 22), a 1.7 x 1.2 cm  right paratracheal lymph node (series 3, image 18), and a 1.6 x 1.2 cm  prevascular lymph node (series 3, image 22). Small sliding hiatal  hernia.    Upper abdomen: Partial fatty atrophy of the pancreas.    Bones/soft tissues: Degenerative  changes of the spine. No acute or  suspicious osseous lesions.      Impression    IMPRESSION:   1. Extensive bilateral groundglass and reticular opacities are  slightly increased compared to chest CT from 8/30/2021. This likely  represents progression of known NSIP, however superimposed infection  or cannot be excluded.  2. Small bilateral pleural effusions.  3. Stable mediastinal lymphadenopathy, likely reactive.    I have personally reviewed the examination and initial interpretation  and I agree with the findings.    ANUPAM GASTON,          SYSTEM ID:  [id number]   Glucose by meter   Result Value Ref Range    GLUCOSE BY METER POCT 170 (H) 70 - 99 mg/dL   Glucose by meter   Result Value Ref Range    GLUCOSE BY METER POCT 189 (H) 70 - 99 mg/dL   CBC with platelets   Result Value Ref Range    WBC Count 23.0 (H) 4.0 - 11.0 10e3/uL    RBC Count 3.45 (L) 4.40 - 5.90 10e6/uL    Hemoglobin 9.6 (L) 13.3 - 17.7 g/dL    Hematocrit 29.7 (L) 40.0 - 53.0 %    MCV 86 78 - 100 fL    MCH 27.8 26.5 - 33.0 pg    MCHC 32.3 31.5 - 36.5 g/dL    RDW 16.1 (H) 10.0 - 15.0 %    Platelet Count 126 (L) 150 - 450 10e3/uL   Basic metabolic panel   Result Value Ref Range    Sodium 138 133 - 144 mmol/L    Potassium 4.3 3.4 - 5.3 mmol/L    Chloride 107 94 - 109 mmol/L    Carbon Dioxide (CO2) 26 20 - 32 mmol/L    Anion Gap 5 3 - 14 mmol/L    Urea Nitrogen 22 7 - 30 mg/dL    Creatinine 0.76 0.66 - 1.25 mg/dL    Calcium 8.6 8.5 - 10.1 mg/dL    Glucose 160 (H) 70 - 99 mg/dL    GFR Estimate >90 >60 mL/min/1.73m2   Hepatitis C Screen Reflex to HCV RNA Quant and Genotype   Result Value Ref Range    Hepatitis C Antibody Nonreactive Nonreactive    Narrative    Assay performance characteristics have not been established for newborns, infants, and children.   INR   Result Value Ref Range    INR 1.19 (H) 0.85 - 1.15   ABO/Rh type and screen    Narrative    The following orders were created for panel order ABO/Rh type and screen.  Procedure                                Abnormality         Status                     ---------                               -----------         ------                     Adult Type and Screen[[id number]]                            In process                   Please view results for these tests on the individual orders.   Glucose by meter   Result Value Ref Range    GLUCOSE BY METER POCT 165 (H) 70 - 99 mg/dL   Lactic Acid STAT   Result Value Ref Range    Lactic Acid 1.1 0.7 - 2.0 mmol/L   Glucose by meter   Result Value Ref Range    GLUCOSE BY METER POCT 142 (H) 70 - 99 mg/dL

## 2021-09-08 NOTE — CONSULTS
Cardiothoracic surgery lung transplant consultation      Edson Thornton MRN# [id number]   YOB: 1965 Age: 56 year old   Date of Service: September 8, 2021         Reason for consult: Edson Thornton is a 56 year old  male with life threatening end stage lung disease who is undergoing evaluation for lung transplant.           Assessment and Plan:   Mr. Thornton is a 56 year old male with rheumatoid arthritis and RA-associated life threatening, end-stage, interstitial lung disease. I recommend lung transplantation. I discussed the technical details of the procedure with the patient including the possibility of single versus double lung transplant (although I would recommend bilateral), as well as the possibility of a sternotomy incision, bilateral anterolateral thoracotomy with transverse sternotomy (clamshell), and thoracotomy incisions. In his particular case, I would recommend a clamshell incision.     I also discussed the possibility of pre- or post-operative extracorporeal membrane oxygenation, and I believe he is a reasonable candidate for both.     I described donor selection including DCD and DBD donors, as well as PHS risk and hepatitis C VISHAL positive donors. I also discussed donor lung preservation with cold static storage and ex vivo lung perfusion.    I also explained the expected postoperative course and recovery, including the likelihood of a prolonged hospitalization. The patient understands the risks and benefits of the procedure. The risks include but are not limited to bleeding, infection including opportunistic infection, stroke, primary graft dysfunction, respiratory failure possibly requiring prolonged ventilation and tracheostomy, lifelong need for immunosuppression, acute or chronic rejection, renal failure, hepatic insufficiency, visceral or limb ischemia, and death. The patient understands these risks and wishes to undergo the operation. After discussion in multidisciplinary lung  transplant conference, listing may be finalized.             Chief Complaint:   Edson Thornton is a 56 year old male who complains of chronic dyspnea and hypoxia due to interstitial lung disease.         History of Present Illness:   This patient is a 56 year old male who presents with rheumatoid arthritis and RA-associated interstitial lung disease. He has previously been treated with prednisone and leflunomide for his RA in September 2020 with resolution of this symptoms, but he then developed dyspnea. He was recently admitted to an outside hospital in [place] with worsening hypoxic respiratory failure over the past few weeks. He was on 4 LPM at baseline and then had worsening hypoxia requiring high flow nasal cannula at 60 LPM (90% FiO2). He was treated with a steroid bolus and rituxamab. He was also started on empiric broad spectrum antibiotics. He also had some lower extremity edema and this resolved with lasix.      In addition to RA and RA-ILD, he has hypertension, dyslipidemia and obstructive sleep apnea.            Past Medical History:     Past Medical History:   Diagnosis Date     Anxiety      Depression      HLD (hyperlipidemia)      HTN (hypertension)      Hypothyroidism      ILD (interstitial lung disease) (H)      SALTY on CPAP      Oxygen dependent     BL 4L since ~6/2021     Rheumatoid arthritis (H)     signs ~5/2020, dx 5/2021     Steroid-induced hyperglycemia      Traction bronchiectasis (H)              Past Surgical History:     Past Surgical History:   Procedure Laterality Date     COLONOSCOPY W/ BIOPSIES AND POLYPECTOMY  07/21/2020     HERNIA REPAIR       right acl       XR ACROMIOCLAVICULAR JOINT BILATERAL                 Social History:     Social History     Tobacco Use     Smoking status: Never Smoker     Smokeless tobacco: Never Used   Substance Use Topics     Alcohol use: Never             Family History:     Family History   Problem Relation Age of Onset     Diabetes Type 1 Mother       Heart Disease Mother      Chronic Obstructive Pulmonary Disease Mother      Rheumatoid Arthritis Father      Emphysema Paternal Grandfather              Immunizations:     There is no immunization history on file for this patient.          Allergies:   No Known Allergies          Medications:     Current Facility-Administered Medications Ordered in Epic   Medication Dose Route Frequency Last Rate Last Admin     acetaminophen (TYLENOL) tablet 325-650 mg  325-650 mg Oral Q4H PRN         amLODIPine (NORVASC) tablet 5 mg  5 mg Oral Daily   5 mg at 09/08/21 0814     azithromycin (ZITHROMAX) tablet 250 mg  250 mg Oral Daily   250 mg at 09/08/21 0815     benzocaine 20% (HURRICAINE/TOPEX) 20 % spray 0.5-1 mL  1-2 spray Mouth/Throat Q3H PRN         bisacodyl (DULCOLAX) Suppository 10 mg  10 mg Rectal Daily PRN         carboxymethylcellulose PF (REFRESH PLUS) 0.5 % ophthalmic solution 1 drop  1 drop Both Eyes Q1H PRN         ceFEPIme (MAXIPIME) 2 g vial to attach to  ml bag for ADULTS or 50 ml bag for PEDS  2 g Intravenous Q8H 200 mL/hr at 09/08/21 0422 2 g at 09/08/21 0422     glucose gel 15-30 g  15-30 g Oral Q15 Min PRN        Or     dextrose 50 % injection 25-50 mL  25-50 mL Intravenous Q15 Min PRN        Or     glucagon injection 1 mg  1 mg Subcutaneous Q15 Min PRN         escitalopram (LEXAPRO) tablet 5 mg  5 mg Oral QPM   5 mg at 09/07/21 2025     fluconazole (DIFLUCAN) intermittent infusion 400 mg in NaCl  400 mg Intravenous Q24H 100 mL/hr at 09/07/21 2025 400 mg at 09/07/21 2025     fluticasone (ARNUITY ELLIPTA) 100 MCG/ACT inhaler 1 puff  1 puff Inhalation Daily   1 puff at 09/08/21 0812     heparin ANTICOAGULANT injection 5,000 Units  5,000 Units Subcutaneous Q12H   5,000 Units at 09/08/21 0812     insulin aspart (NovoLOG) injection (RAPID ACTING)   Subcutaneous TID AC   5 Units at 09/07/21 1810     insulin aspart (NovoLOG) injection (RAPID ACTING)  1-7 Units Subcutaneous TID AC   1 Units at 09/07/21 1322      insulin aspart (NovoLOG) injection (RAPID ACTING)  1-5 Units Subcutaneous At Bedtime   2 Units at 09/06/21 2317     insulin glargine (LANTUS PEN) injection 5 Units  5 Units Subcutaneous QAM AC   5 Units at 09/08/21 0812     ipratropium - albuterol 0.5 mg/2.5 mg/3 mL (DUONEB) neb solution 3 mL  3 mL Nebulization Q2H PRN        Or     ipratropium - albuterol 0.5 mg/2.5 mg/3 mL (DUONEB) neb solution 3 mL  3 mL Nebulization Q2H PRN         levothyroxine (SYNTHROID/LEVOTHROID) tablet 25 mcg  25 mcg Oral QAM AC   25 mcg at 09/08/21 0814     lidocaine (LMX4) cream   Topical Q1H PRN         lidocaine 1 % 0.1-1 mL  0.1-1 mL Other Q1H PRN         melatonin tablet 1 mg  1 mg Oral At Bedtime PRN         melatonin tablet 3 mg  3 mg Oral At Bedtime   3 mg at 09/07/21 2025     methylPREDNISolone sodium succinate (solu-MEDROL) injection 125 mg  125 mg Intravenous Q6H   125 mg at 09/08/21 0815     No lozenges or gum should be given while patient on BIPAP/AVAPS/AVAPS AE   Does not apply Continuous PRN         ondansetron (ZOFRAN-ODT) ODT tab 4 mg  4 mg Oral Q6H PRN        Or     ondansetron (ZOFRAN) injection 4 mg  4 mg Intravenous Q6H PRN         pantoprazole (PROTONIX) EC tablet 40 mg  40 mg Oral BID AC   40 mg at 09/08/21 0815     Patient may continue current oral medications   Does not apply Continuous PRN         polyethylene glycol (MIRALAX) Packet 17 g  17 g Oral Daily PRN         sodium chloride (PF) 0.9% PF flush 3 mL  3 mL Intracatheter Q8H   3 mL at 09/08/21 0734     sodium chloride (PF) 0.9% PF flush 3 mL  3 mL Intracatheter q1 min prn   10 mL at 09/08/21 0815     sulfamethoxazole-trimethoprim (BACTRIM DS) 800-160 MG per tablet 3 tablet  3 tablet Oral TID   3 tablet at 09/08/21 0814     No current Epic-ordered outpatient medications on file.             Review of Systems:   The 10 point Review of Systems is negative other than noted in the HPI            Physical Exam:   Vitals were reviewed  Temp: 97.5  F (36.4  C)  Temp src: Axillary BP: (!) 142/82 Pulse: 74   Resp: 30 SpO2: (!) 88 % O2 Device: BiPAP/CPAP Oxygen Delivery: 35 LPM  Constitutional:   awake, alert, cooperative, no apparent distress, and appears stated age     Eyes:   Lids and lashes normal, pupils equal, round and reactive to light, extra ocular muscles intact, sclera clear, conjunctiva normal     ENT:   normocepalic, without obvious abnormality     Neck:   supple, symmetrical, trachea midline     Lungs:   Mild respiratory distress, barely adequate air exchange, labored breathing but able to converse and no retractions     Cardiovascular:   regular rate and rhythm     Chest / Breast:   No incisions         Abdomen:   non-distended     Musculoskeletal:   no lower extremity pitting edema present  there is no redness, warmth, or swelling of the joints  full range of motion noted  motor strength is 5 out of 5 all extremities bilaterally     Neurologic:   Mental Status Exam:  Level of Alertness:   awake  Orientation:   person, place, time  Cranial Nerves:  cranial nerves II-XII are grossly intact  Motor Exam:  moves all extremities well and symmetrically     Neuropsychiatric:   General: normal, calm and normal eye contact  Level of consciousness: alert / normal  Affect: normal     Skin:   no bruising or bleeding, normal skin color, texture, turgor and no redness, warmth, or swelling          Data:   All laboratory data reviewed  All cardiac studies reviewed by me.  All imaging studies reviewed by me.    CT CHEST:  1. Extensive bilateral groundglass and reticular opacities are  slightly increased compared to chest CT from 8/30/2021. This likely  represents progression of known NSIP, however superimposed infection  or cannot be excluded.  2. Small bilateral pleural effusions.  3. Stable mediastinal lymphadenopathy, likely reactive.       TRANSTHORACIC ECHOCARDIOGRAM:  Global and regional left ventricular function is normal with an EF of 60-65%.  Global right ventricular  function is normal.  No significant valvular abnormalities present.  The patent foramen ovale was demonstrated by color Doppler .  Estimated pulmonary artery systolic pressure is 40 mmHg. Pulmonary  hypertension is present.  The inferior vena cava was normal in size with preserved respiratory  variability.  No pericardial effusion is present.    CARDIAC CATHETERIZATION:  Pending this afternoon    NUC MED LUNG PERF:  Quantitative evaluation shows 53% contribution of the right lung as  compared to 47% contribution of the left lung.

## 2021-09-08 NOTE — PLAN OF CARE
"Care for  from [id number]:    /72   Pulse 82   Temp 97.6  F (36.4  C) (Oral)   Resp 26   Ht 1.626 m (5' 4\")   Wt 68.7 kg (151 lb 8 oz)   SpO2 (!) 89%   BMI 26.00 kg/m        Neuro: A&Ox4. Calls as needed.  Cardiac: SR. VSS.  Pt went to cath lab has rt TR band removing air per orders.  Respiratory: Sating 88%> on HFNC % FIO2 @ 35 LPM.  BiPap for sleep 65%.  GI/: Adequate urine output into urinal. BM X1  Diet/appetite: Tolerating clears diet. Eating well.  ACHS.  Activity:  Assist of 2, up to repo in bed.  Pain: At acceptable level on current regimen.   Skin: No new deficits noted.  LDA's:PICC 3 lumen    Plan: Continue with POC. Notify primary team with changes.    "

## 2021-09-08 NOTE — PROGRESS NOTES
[facility]   Pulmonary   Progress Note  Edson Thornton MRN: [id number]  1965  Date of Admission:9/5/2021  Date of Service: 09/08/2021        Assessment & Plan    56-year-old male (BMI 27.6) with history of NSIP/ILD, RA (status post Rituxan infusion, leflunomide), SALTY, HTN transferred from OSH for worsening acute hypoxemic respiratory failure in the setting of progressive ILD necessitating evaluation for lung transplant.     1. Acute hypoxic respiratory failure  2. NSIP ILD  3. Rheumatoid arthritis (positive anti-CCP, RF)     Discussion:   56 year old male with PMHx most significant for RA and NSIP/ILD with rapidly increasing oxygen requirements in the setting of progressive hypoxic respiratory failure.  This is patient's third hospitalization within the past month, and the second related to his progressive dyspnea.  His infectious work-up at OSH has been largely negative--including negative fungal serologies, COVID-19 testing, numerous bacterial antigen testings, respiratory cultures, and RVP.  Moreover patient has undergone multiple steroid regimens and was recently on methylprednisolone 125 mg q6h IV prior to [facility] transfer.     Patient continues to require high flow nasal cannula 90% O2 to maintain oxygenation levels in the low 90s.  Given his persistent hypoxemia despite adequate glucocorticoid, antifungal, and antibiotic therapies, he is undergoing an expedited lung transplant workup. Of note, PFTs taken in July of this year demonstrate what appears to be a restrictive lung pattern.      Regarding his ILD management, patient is receiving methyprednisolone 125 mg IV q6H with minimal improvement--albeit, improvement may take weeks to show. High-res chest CT taken 9/7 is notable for diffuse groundglass opacities, mosaic pattern suggestive of air trapping related to severe NSIP-ILD. Repeat TTP, BNP and CRP were unremarkable.         Recommendations:    -Continue with process for lung transplant,  unlikely patient will benefit from antifibrotic therapy or MMF given severity of disease process     We will continue to follow.     Patient seen & discussed w/  Dr. Daniels.     Faisal Caban MD   Pulmonary/Critical Care Fellow   Pager #[phone]     Interval History      RN notes reviewed, no acute events overnight.  Patient states that his appetite is decreased over the past couple days, but denies any abdominal pain.  He reports that his breathing is unchanged compared to yesterday, with persistent desaturations whenever he uses the bathroom, or with any movement.  Patient still endorses an intermittent nonproductive cough.  He denies chest pain, fevers, chills, hemoptysis, and/V/D.    Physical Exam Temp:  [97.5  F (36.4  C)-98.5  F (36.9  C)] 98.5  F (36.9  C)  Pulse:  [63-89] 81  Resp:  [20-30] 24  BP: (125-157)/() 147/79  FiO2 (%):  [55 %-100 %] 90 %  SpO2:  [88 %-96 %] 91 %  I/O last 3 completed shifts:  In: 1020 [P.O.:1000; I.V.:20]  Out: 1450 [Urine:1450]  Wt Readings from Last 1 Encounters:   09/08/21 68.7 kg (151 lb 8 oz)    Body mass index is 26 kg/m . FiO2 (%): 90 %  Resp: 24      Exam:  General: Cooperative, NAD.  HEENT: Anicteric sclera, EOMI, MMM, OP unobstructed, w/o erythema/discharge; no cervical LAD  CV: RRR, no m/r/g  Lungs: Bibasilar crackles; speaking in full sentences on HFNC 90% O2.   Abd: Soft, NT, ND  Ext: WWP,  No LE edema  Skin: No rashes, cyanosis, or jaundice  Neuro: AAOx3, no focal deficits    Data   Labs: reviewed in EMR and notable labs listed below.  WBC: 23  Hgb: 9.6  Plt: 126    INR: 1.19      Imaging: reviewed in EMR and notable imaging listed below.    Chest CT high resolution 9/7/21:  IMPRESSION:   1. Extensive bilateral groundglass and reticular opacities are  slightly increased compared to chest CT from 8/30/2021. This likely  represents progression of known NSIP, however superimposed infection  or cannot be excluded.  2. Small bilateral pleural effusions.  3.  Stable mediastinal lymphadenopathy, likely reactive.     Medications     amLODIPine  5 mg Oral Daily     azithromycin  250 mg Oral Daily     ceFEPIme (MAXIPIME) IV  2 g Intravenous Q8H     escitalopram  5 mg Oral QPM     fluconazole  400 mg Intravenous Q24H     fluticasone  1 puff Inhalation Daily     heparin ANTICOAGULANT  5,000 Units Subcutaneous Q12H     insulin aspart   Subcutaneous TID AC     insulin aspart  1-7 Units Subcutaneous TID AC     insulin aspart  1-5 Units Subcutaneous At Bedtime     insulin glargine  5 Units Subcutaneous QAM AC     levothyroxine  25 mcg Oral QAM AC     melatonin  3 mg Oral At Bedtime     methylPREDNISolone  125 mg Intravenous Q6H     pantoprazole  40 mg Oral BID AC     sodium chloride (PF)  3 mL Intracatheter Q8H     sulfamethoxazole-trimethoprim  3 tablet Oral TID

## 2021-09-09 ENCOUNTER — APPOINTMENT (OUTPATIENT)
Dept: PHYSICAL THERAPY | Facility: CLINIC | Age: 56
End: 2021-09-09
Attending: INTERNAL MEDICINE
Payer: COMMERCIAL

## 2021-09-09 ENCOUNTER — COMMITTEE REVIEW (OUTPATIENT)
Dept: TRANSPLANT | Facility: CLINIC | Age: 56
End: 2021-09-09

## 2021-09-09 LAB
ALDOLASE SERPL-CCNC: 6.2 U/L
ANION GAP SERPL CALCULATED.3IONS-SCNC: 9 MMOL/L (ref 3–14)
BUN SERPL-MCNC: 19 MG/DL (ref 7–30)
CALCIUM SERPL-MCNC: 8.7 MG/DL (ref 8.5–10.1)
CHLORIDE BLD-SCNC: 103 MMOL/L (ref 94–109)
CO2 SERPL-SCNC: 25 MMOL/L (ref 20–32)
CREAT SERPL-MCNC: 0.65 MG/DL (ref 0.66–1.25)
CRP SERPL-MCNC: <2.9 MG/L (ref 0–8)
ERYTHROCYTE [DISTWIDTH] IN BLOOD BY AUTOMATED COUNT: 16 % (ref 10–15)
FERRITIN SERPL-MCNC: 1049 NG/ML (ref 26–388)
G6PD RBC-CCNT: 21 U/G HB
GFR SERPL CREATININE-BSD FRML MDRD: >90 ML/MIN/1.73M2
GLUCOSE BLD-MCNC: 149 MG/DL (ref 70–99)
GLUCOSE BLDC GLUCOMTR-MCNC: 139 MG/DL (ref 70–99)
GLUCOSE BLDC GLUCOMTR-MCNC: 146 MG/DL (ref 70–99)
GLUCOSE BLDC GLUCOMTR-MCNC: 161 MG/DL (ref 70–99)
GLUCOSE BLDC GLUCOMTR-MCNC: 168 MG/DL (ref 70–99)
GLUCOSE BLDC GLUCOMTR-MCNC: 169 MG/DL (ref 70–99)
HCT VFR BLD AUTO: 31.3 % (ref 40–53)
HGB BLD-MCNC: 10 G/DL (ref 13.3–17.7)
IGG SERPL-MCNC: 363 MG/DL (ref 610–1616)
IRON SATN MFR SERPL: 77 % (ref 15–46)
IRON SERPL-MCNC: 146 UG/DL (ref 35–180)
LACTATE SERPL-SCNC: 1.3 MMOL/L (ref 0.7–2)
MCH RBC QN AUTO: 27.9 PG (ref 26.5–33)
MCHC RBC AUTO-ENTMCNC: 31.9 G/DL (ref 31.5–36.5)
MCV RBC AUTO: 87 FL (ref 78–100)
PLATELET # BLD AUTO: 131 10E3/UL (ref 150–450)
POTASSIUM BLD-SCNC: 4 MMOL/L (ref 3.4–5.3)
PROTOCOL CUTOFF: NORMAL
RBC # BLD AUTO: 3.58 10E6/UL (ref 4.4–5.9)
SA 1 CELL: NORMAL
SA 1 TEST METHOD: NORMAL
SA 2 CELL: NORMAL
SA 2 TEST METHOD: NORMAL
SA1 HI RISK ABY: NORMAL
SA1 MOD RISK ABY: NORMAL
SA2 HI RISK ABY: NORMAL
SA2 MOD RISK ABY: NORMAL
SODIUM SERPL-SCNC: 137 MMOL/L (ref 133–144)
TIBC SERPL-MCNC: 190 UG/DL (ref 240–430)
UNACCEPTABLE ANTIGENS: NORMAL
UNOS CPRA: 0
WBC # BLD AUTO: 22.4 10E3/UL (ref 4–11)
ZZZSA 1  COMMENTS: NORMAL
ZZZSA 2 COMMENTS: NORMAL

## 2021-09-09 PROCEDURE — 99367 TEAM CONF W/O PAT BY PHYS: CPT | Performed by: INTERNAL MEDICINE

## 2021-09-09 PROCEDURE — 80048 BASIC METABOLIC PNL TOTAL CA: CPT | Performed by: STUDENT IN AN ORGANIZED HEALTH CARE EDUCATION/TRAINING PROGRAM

## 2021-09-09 PROCEDURE — 83550 IRON BINDING TEST: CPT | Performed by: INTERNAL MEDICINE

## 2021-09-09 PROCEDURE — 250N000013 HC RX MED GY IP 250 OP 250 PS 637: Performed by: INTERNAL MEDICINE

## 2021-09-09 PROCEDURE — 36592 COLLECT BLOOD FROM PICC: CPT | Performed by: STUDENT IN AN ORGANIZED HEALTH CARE EDUCATION/TRAINING PROGRAM

## 2021-09-09 PROCEDURE — 250N000011 HC RX IP 250 OP 636: Performed by: STUDENT IN AN ORGANIZED HEALTH CARE EDUCATION/TRAINING PROGRAM

## 2021-09-09 PROCEDURE — 99233 SBSQ HOSP IP/OBS HIGH 50: CPT | Mod: GC | Performed by: STUDENT IN AN ORGANIZED HEALTH CARE EDUCATION/TRAINING PROGRAM

## 2021-09-09 PROCEDURE — 94660 CPAP INITIATION&MGMT: CPT

## 2021-09-09 PROCEDURE — 36592 COLLECT BLOOD FROM PICC: CPT | Performed by: INTERNAL MEDICINE

## 2021-09-09 PROCEDURE — 120N000003 HC R&B IMCU UMMC

## 2021-09-09 PROCEDURE — 97530 THERAPEUTIC ACTIVITIES: CPT | Mod: GP

## 2021-09-09 PROCEDURE — 97110 THERAPEUTIC EXERCISES: CPT | Mod: GP

## 2021-09-09 PROCEDURE — 250N000013 HC RX MED GY IP 250 OP 250 PS 637: Performed by: STUDENT IN AN ORGANIZED HEALTH CARE EDUCATION/TRAINING PROGRAM

## 2021-09-09 PROCEDURE — 250N000013 HC RX MED GY IP 250 OP 250 PS 637: Performed by: PHYSICIAN ASSISTANT

## 2021-09-09 PROCEDURE — 85027 COMPLETE CBC AUTOMATED: CPT | Performed by: STUDENT IN AN ORGANIZED HEALTH CARE EDUCATION/TRAINING PROGRAM

## 2021-09-09 PROCEDURE — 99233 SBSQ HOSP IP/OBS HIGH 50: CPT | Mod: 25 | Performed by: INTERNAL MEDICINE

## 2021-09-09 PROCEDURE — 86140 C-REACTIVE PROTEIN: CPT | Performed by: STUDENT IN AN ORGANIZED HEALTH CARE EDUCATION/TRAINING PROGRAM

## 2021-09-09 PROCEDURE — 250N000011 HC RX IP 250 OP 636: Performed by: PHYSICIAN ASSISTANT

## 2021-09-09 PROCEDURE — 82728 ASSAY OF FERRITIN: CPT | Performed by: INTERNAL MEDICINE

## 2021-09-09 PROCEDURE — 99231 SBSQ HOSP IP/OBS SF/LOW 25: CPT | Mod: GC | Performed by: INTERNAL MEDICINE

## 2021-09-09 PROCEDURE — 999N000157 HC STATISTIC RCP TIME EA 10 MIN

## 2021-09-09 PROCEDURE — 83605 ASSAY OF LACTIC ACID: CPT | Performed by: INTERNAL MEDICINE

## 2021-09-09 RX ORDER — FLUCONAZOLE 200 MG/1
400 TABLET ORAL DAILY
Status: DISCONTINUED | OUTPATIENT
Start: 2021-09-09 | End: 2021-09-15

## 2021-09-09 RX ORDER — METHYLPREDNISOLONE SODIUM SUCCINATE 125 MG/2ML
125 INJECTION, POWDER, LYOPHILIZED, FOR SOLUTION INTRAMUSCULAR; INTRAVENOUS EVERY 12 HOURS
Status: COMPLETED | OUTPATIENT
Start: 2021-09-09 | End: 2021-09-11

## 2021-09-09 RX ORDER — SULFAMETHOXAZOLE/TRIMETHOPRIM 800-160 MG
1 TABLET ORAL
Status: DISCONTINUED | OUTPATIENT
Start: 2021-09-10 | End: 2021-10-16

## 2021-09-09 RX ORDER — CHOLESTYRAMINE 4 G/9G
2 POWDER, FOR SUSPENSION ORAL 3 TIMES DAILY
Status: DISPENSED | OUTPATIENT
Start: 2021-09-09 | End: 2021-09-20

## 2021-09-09 RX ADMIN — METHYLPREDNISOLONE SODIUM SUCCINATE 125 MG: 125 INJECTION, POWDER, FOR SOLUTION INTRAMUSCULAR; INTRAVENOUS at 07:52

## 2021-09-09 RX ADMIN — INSULIN ASPART 5 UNITS: 100 INJECTION, SOLUTION INTRAVENOUS; SUBCUTANEOUS at 16:03

## 2021-09-09 RX ADMIN — AMLODIPINE BESYLATE 5 MG: 5 TABLET ORAL at 07:53

## 2021-09-09 RX ADMIN — INSULIN ASPART 4 UNITS: 100 INJECTION, SOLUTION INTRAVENOUS; SUBCUTANEOUS at 07:54

## 2021-09-09 RX ADMIN — HEPARIN SODIUM 5000 UNITS: 5000 INJECTION, SOLUTION INTRAVENOUS; SUBCUTANEOUS at 07:54

## 2021-09-09 RX ADMIN — HEPARIN SODIUM 5000 UNITS: 5000 INJECTION, SOLUTION INTRAVENOUS; SUBCUTANEOUS at 20:52

## 2021-09-09 RX ADMIN — METHYLPREDNISOLONE SODIUM SUCCINATE 125 MG: 125 INJECTION, POWDER, FOR SOLUTION INTRAMUSCULAR; INTRAVENOUS at 20:51

## 2021-09-09 RX ADMIN — CHOLESTYRAMINE 8 G: 4 POWDER, FOR SUSPENSION ORAL at 16:45

## 2021-09-09 RX ADMIN — INSULIN GLARGINE 5 UNITS: 100 INJECTION, SOLUTION SUBCUTANEOUS at 07:53

## 2021-09-09 RX ADMIN — CEFEPIME HYDROCHLORIDE 2 G: 2 INJECTION, POWDER, FOR SOLUTION INTRAVENOUS at 05:24

## 2021-09-09 RX ADMIN — PANTOPRAZOLE SODIUM 40 MG: 40 TABLET, DELAYED RELEASE ORAL at 16:03

## 2021-09-09 RX ADMIN — METHYLPREDNISOLONE SODIUM SUCCINATE 125 MG: 125 INJECTION, POWDER, FOR SOLUTION INTRAMUSCULAR; INTRAVENOUS at 01:27

## 2021-09-09 RX ADMIN — PANTOPRAZOLE SODIUM 40 MG: 40 TABLET, DELAYED RELEASE ORAL at 07:52

## 2021-09-09 RX ADMIN — AZITHROMYCIN MONOHYDRATE 250 MG: 250 TABLET ORAL at 07:53

## 2021-09-09 RX ADMIN — CHOLESTYRAMINE 8 G: 4 POWDER, FOR SUSPENSION ORAL at 21:12

## 2021-09-09 RX ADMIN — Medication 3 MG: at 20:51

## 2021-09-09 RX ADMIN — ESCITALOPRAM OXALATE 5 MG: 5 TABLET, FILM COATED ORAL at 20:51

## 2021-09-09 RX ADMIN — CEFEPIME HYDROCHLORIDE 2 G: 2 INJECTION, POWDER, FOR SOLUTION INTRAVENOUS at 20:52

## 2021-09-09 RX ADMIN — FLUTICASONE FUROATE 1 PUFF: 100 POWDER RESPIRATORY (INHALATION) at 07:52

## 2021-09-09 RX ADMIN — CEFEPIME HYDROCHLORIDE 2 G: 2 INJECTION, POWDER, FOR SOLUTION INTRAVENOUS at 12:55

## 2021-09-09 RX ADMIN — SULFAMETHOXAZOLE AND TRIMETHOPRIM 3 TABLET: 800; 160 TABLET ORAL at 07:52

## 2021-09-09 RX ADMIN — FLUCONAZOLE 400 MG: 200 TABLET ORAL at 20:51

## 2021-09-09 RX ADMIN — LEVOTHYROXINE SODIUM 25 MCG: 0.03 TABLET ORAL at 07:52

## 2021-09-09 ASSESSMENT — ACTIVITIES OF DAILY LIVING (ADL)
ADLS_ACUITY_SCORE: 19

## 2021-09-09 NOTE — PROVIDER NOTIFICATION
Time of notification: 7:46 AM  Provider notified:Pgaed Dr Sundar Maldonado.  Spoke via Ascom  Patient status:Pt lost IV access, possible discharge today.  OK to hold getting a new IV at this time per MD  Temp:  [97.4  F (36.3  C)-98.5  F (36.9  C)] 98.3  F (36.8  C)  Pulse:  [64-87] 73  Resp:  [10-26] 18  BP: (123-166)/(72-88) 137/73  FiO2 (%):  [60 %-100 %] 100 %  SpO2:  [89 %-95 %] 92 %

## 2021-09-09 NOTE — PROGRESS NOTES
"Met with patient (at bedside) and patient's family (via phone/video due to COVID hospital visitor restrictions) for pre-lung transplant education. Content primarily based per [employer] videos.   They were attentive, stated understanding, and asked pertinent questions.     They were given a folder with both required and relevant handouts. Provided contact information to reach the Solid Organ Transplant office or designated Pre-Transplant Coordinator for questions.       Verified that patient has received the following items:  \"Questions and Answers for Transplant Candidates and Families about Multiple Listing Waiting Time Transfer\"    One-Year Survival Rates for Heart and Lung Transplant  for [facility]. ?      Reviewed the following documents with the patient:  \"Guidelines for Being on the Transplant List\". ?   \" What You Need to Know about a Lung and Heart-Lung Transplant . ?         - Provided Thoracic Transplant Patient Handbook.    - Required signatures obtained and forms are scanned to EMR. ?   - Addressed patient questions and concerns regarding transplant.   - Discussed donor offers including increased risk and DCD donors.    - Discussed physician and coordinator management pre to post lung transplant.    - HLA results pending. ?   - Discussed PRA monitoring with patient.    - Will review with Lung Transplant Team for transplant candidacy when evaluation complete. ?     Patient and family were encouraged to create login/password for [employer] to continue to view videos to reinforce education. ?     Patient received form for option to provide donor family with select recipient information at time of donation/transplant. ?            Mallory Beasley, RN, BSN, PHN  Inpatient Lung Transplant Coordinator  Solid Organ Transplant  [facility]  Pager: [phone]        "

## 2021-09-09 NOTE — PLAN OF CARE
Post Procedure note:    All air removed from TR band by 1845 (12 ml in total).  Band remained in place till 1915.  CMS all intact, no S+S of bleed., pt is bruised at site.  TR band off at 1915.  VSS.

## 2021-09-09 NOTE — PROGRESS NOTES
Brief Cardiology Progress Note    Underwent RHC and coronary angiography yesterday. Angiogram notes normal coronary arteries with mild tortuosity. RHC numbers denoting moderate pulmonary hypertension without component of left sided heart failure. No additional cardiac workup needed prior to lung transplant from cardiology standpoint.     The patient was seen and discussed with Dr. Pickens, who agrees with the above assessment and plan. Cardiology service will sign off.     Kaity Ortiz MD  Cardiology Fellow PGY4  P: [phone]

## 2021-09-09 NOTE — PLAN OF CARE
Neuro: A&Ox4.   Cardiac: SR. VSS. B/P: 123/73, T: 97.4, P: 64, R: 19  Respiratory: Sating >88% (goal) on % HFNC / 55-65% Bipap.  GI/: Adequate urine output via urinal  Diet/appetite: Clear liquid diet.  Activity:  Turns and repositions self independently in bed. Desats with activity  Pain: At acceptable level on current regimen.   Skin: No new deficits noted. Gecko pad in place on bridge of nose. Scrotal redness - WOC consult see note  LDA's: R PICC    Lung transplant workup    Plan: Continue with POC. Notify primary team with changes.

## 2021-09-09 NOTE — PROGRESS NOTES
[facility]   Pulmonary   Progress Note  Edson Thornton MRN: [id number]  1965  Date of Admission:9/5/2021  Date of Service: 09/09/2021        Assessment & Plan    56-year-old male (BMI 27.6) with history of NSIP/ILD, RA (status post Rituxan infusion, leflunomide), SALTY, HTN transferred from OSH for worsening acute hypoxemic respiratory failure in the setting of progressive ILD necessitating evaluation for lung transplant.     1. Acute hypoxic respiratory failure  2. NSIP ILD  3. Rheumatoid arthritis (positive anti-CCP, RF)     Discussion:   56 year old male with PMHx most significant for RA and NSIP/ILD with rapidly increasing oxygen requirements in the setting of progressive hypoxic respiratory failure.  This is patient's third hospitalization within the past month, and the second related to his progressive dyspnea.  His infectious work-up at OSH has been largely negative--including negative fungal serologies, COVID-19 testing, numerous bacterial antigen testings, respiratory cultures, and RVP.  Moreover patient has undergone multiple steroid regimens and was recently on methylprednisolone 125 mg q6h IV prior to [facility] transfer.     Patient's O2 requirements remain high on HFNC 90% O2 despite ongoing glucocorticoid, antifungal, and antibiotic therapies.  Given that he has not improved with high-dose steroids, it is appropriate to slowly taper his prednisone burden in order to meet the requirements for lung transplantation.  Patient was appears in good spirits regarding the operation and is currently being evaluated by the multidisciplinary lung transplant team.     Recommendations:   -Recommend steroid taper starting with methylprednisolone 125 mg IV BID x3 days, then 125 mg daily x3 days, then prednisone  60 mg PO daily.  Each step then should be in 10 mg increments, for 3 days each, until patient reaches a goal prednisone dose of 20 mg daily  -Recommend changing Bactrim regimen to prophylaxis  dosing  -Continue to trend CRP to determine if inflammatory response remains suppressed with decreasing steroids  -Continue antibiotics and antifungals at this time   -Continue with process for lung transplant, unlikely patient will benefit from antifibrotic therapy or MMF given severity of disease process     We will continue to follow.     Patient seen & discussed w/  Dr. Daniels.     Faisal Caban MD   Pulmonary/Critical Care Fellow   Pager #[phone]     Interval History      RN notes reviewed, no acute events overnight.  Patient reports he feels unchanged compared to yesterday, states that he understands the risks of lung transplant surgery and is willing to proceed.  Patient states he is in good spirits and is looking forward to speaking with the lung transplant social workers today.  No fever/chills, night sweats, sore throat, hemoptysis, worsens chest pain, abdominal pain, N/V/D.    Five-point ROS is negative except for what is noted in the interval history.    Physical Exam Temp:  [97.4  F (36.3  C)-98.5  F (36.9  C)] 98.3  F (36.8  C)  Pulse:  [64-87] 73  Resp:  [10-26] 18  BP: (123-166)/(72-88) 137/73  FiO2 (%):  [60 %-100 %] 90 %  SpO2:  [89 %-95 %] 92 %  I/O last 3 completed shifts:  In: 871.25 [P.O.:400; I.V.:471.25]  Out: 1850 [Urine:1850]  Wt Readings from Last 1 Encounters:   09/08/21 68.7 kg (151 lb 8 oz)    Body mass index is 26 kg/m . FiO2 (%): 90 %  Resp: 18      Exam:  General: Cooperative, NAD.  HEENT: Anicteric sclera. EOMI.  Lungs: Bibasilar crackles; speaking in full sentences on HFNC 90% O2.   Abd: Soft, NT, ND  Ext: WWP,  No LE edema  Skin: Plethoric appearing face, otherwise no cyanosis, or jaundice  Neuro: AAOx3, no focal deficits    Data   Labs: reviewed in EMR and notable labs listed below.  Ferritin: 1049  TIBC: 190  Iron saturation index: 77  Iron: 146    WBC: 22.4  Hgb: 10  Plt ct: 131      Imaging: reviewed in EMR and notable imaging listed below.    Chest CT high resolution  9/7/21:  IMPRESSION:   1. Extensive bilateral groundglass and reticular opacities are  slightly increased compared to chest CT from 8/30/2021. This likely  represents progression of known NSIP, however superimposed infection  or cannot be excluded.  2. Small bilateral pleural effusions.  3. Stable mediastinal lymphadenopathy, likely reactive.     Medications     amLODIPine  5 mg Oral Daily     azithromycin  250 mg Oral Daily     ceFEPIme (MAXIPIME) IV  2 g Intravenous Q8H     escitalopram  5 mg Oral QPM     fluconazole  400 mg Intravenous Q24H     fluticasone  1 puff Inhalation Daily     heparin ANTICOAGULANT  5,000 Units Subcutaneous Q12H     insulin aspart   Subcutaneous TID AC     insulin aspart  1-7 Units Subcutaneous TID AC     insulin aspart  1-5 Units Subcutaneous At Bedtime     insulin glargine  5 Units Subcutaneous QAM AC     levothyroxine  25 mcg Oral QAM AC     melatonin  3 mg Oral At Bedtime     methylPREDNISolone  125 mg Intravenous Q6H     pantoprazole  40 mg Oral BID AC     sodium chloride (PF)  3 mL Intracatheter Q8H     sulfamethoxazole-trimethoprim  3 tablet Oral TID

## 2021-09-09 NOTE — CONSULTS
Patient of Dr. Woods seen in the hospital for psychosocial assessment.   60 minutes spent with patient. 100% of visit consisted of counseling related to issues surrounding Lung disease and Lung transplant.    Psychosocial Assessment   Name: Edson Thornton     MRN:  [id number]        Patient Name / Age / Race: Edson Thornton 56 year old    Source of Information: Patient and Family via video call.   : JOSE ANTONIO Dickson   Interview Date: September 9, 2021   Reason for Referral: Lung Transplant     Current Living Situation   Location (City/State): [facility]  [place] [address]   With Whom: Living arrangements - the patient lives with their family.   Type of Home: single family   Working Phone? Yes    [employer]? Yes    Distance from Hospital: [place], [place]- 4-5 hours.   Need to Relocate Post Surgery? Yes    Discussed? Yes      Vocational/Employment/Financial   Employment: Full time   Occupation:    Education: 12th Grade   ? Yes  [employer]   Income: salary/wages and spouse's income   Insurance: Private Insurance   Name of Private Insurance: - BCBS through his employer.   Medication Coverage: BCBS    In current situation, pt. can afford medication and supply costs:  Yes    Other Financial Concerns/Issues: None expressed at this time.     Family/Social Support   Marital Status: Living with significant other   Partner Name: Peg Eason   Length of Time : Not currently . Is listed as health care agent   Partner s Employment: Peg is on social security income and does not work.   Relationship: stable/supportive   Children: Has a step daughter- Zenia.   Parents: Dad- Kurtis Thornton and step mom, Liliya   Siblings: None mentioned.   Other Family or Friends Close by: None in [place].    Support System: available, helpful   Primary Support Person: His fiance, Peg and step daughter- Zenia.   Issues: None. Very supportive. Peg will be his primary  caregiver.     Activities/Functional Ability   Current Level: ambulatory, independent with ADL's.. until hospitalized. Now waiting in patient for new lungs.   Daily Activities: Needs assistance due to high O2 needs.   Transportation: self      Medical Status   Patient s understanding of need for surgical intervention: Bret is aware that he needs new lungs in order to survive.    Advanced Directive? Yes     Details: Lists his fiance, Peg has his healthcare decision maker.    Adherence History: Good.   Ability to Adhere to Complex Medical Regime: Bret is able to take all medications and follow a complex regime. He does not show any signs of cognitive deficits.     Behavioral   Chemical Dependency Issues: No   Smoker? No   Psychiatric impairment: Deals with some mild anxiety due to his inability to breath.    Coping Style/Strategies: Bret enjoys working, doing yard work and [employer].    Recent Legal History: No   Teaching Completed During Assessment Related To Transplant: 1. Housing and relocation needs post surgery.  2. Caregiver needs post surgery.  3. Financial issues related to surgery.  4. Risks of alcohol use post surgery.  5. Common psychosocial stressors pre/post surgery.  6. Support group availability.   Psychosocial Risks Reviewed Related To Transplant: 1. Increased stress related to your emotional, family, social, employment, or financial situation.  2. Affect on work and/or disability benefits.  3. Affect on future health and life insurance.  4. Outcome expectations may not be met.  5. Mental Health risks: anxiety, depression, PTSD, guilt, grief and chronic fatigue.     Observed Behavior   Well informed? Yes  Angry? No   Process info well? Yes  Hostile? No   Evasive? No Oriented X3? Yes    Cautious/Suspicious? No Motivated? Yes    Appropriate Behavior? Yes  Depressed? No   Appropriate Affect? Yes  Anxious? No   Interview Observations: Bret and his family were all well engaged in the conversation  this date. All asked good questions.     Selection Criteria Met   Plan for Support Yes    Chemical Dependence Yes    Smoking Yes    Mental Health Yes    Adequate Finances Yes      Risk Issues:    None identified.    Final Evaluation/Assessment:     Edson will make a good candidate from a psychosocial perspective. He has very good support from his bellaance Peg and her daughter, Zenia as well as his parents. He has adequate income and insurance. He is able to afford his out of pocket expenses associated with transplant.  He has no significant anxiety or depression.     Patient understands risks and benefits of Lung Transplant: Yes     Recommendation:    good    Signature: JOSE ANTONIO Dickson     Title: Licensed Independent Clinical                Interview Date: September 9, 2021

## 2021-09-09 NOTE — PROGRESS NOTES
Spoke with rheumatology regarding Mr. Apgar. They noted that he has been treated with leflunomide in the past for rheumatoid arthritis. This medication could be one of the contributors to Mr. Apgar's interstitial lung disease. They recommended PO cholestyramine to increase the clearance of leflunomide from the lungs and potentially accelerate recovery. Cholestyramine PO TID has been ordered.    Sherri Layne MD   Internal Medicine Resident, PGY-1

## 2021-09-09 NOTE — PROGRESS NOTES
[facility]    Hospitalist Progress Note    Date of Service (when I saw the patient): 09/09/2021    Assessment & Plan   Edson Thornton is a 56 year old male with history of NSIP associated with rheumatoid arthritis and traction bronchiectasis who was admitted on 9/5/2021 with acute on chronic hypoxic respiratory failure .  The patient is oxygen dependent.      Acute hypoxic respiratory failure  Per pulmonary recommendation, BiPAP used at night, interchanged with high flow nc oxygen during the day. We will continue treatment with IV methylprednisolone 125mg q6h till patient's oxygen requirements to 4-6LPM and then taper. Inhaled fluticasone daily. On PCP prophylaxis with TMP/SMX. High resolution CT scan demonstrated bilateral ground glass opacities and mediastinal lymphadenopathy. Infectious work-up has been so far negative, quantiferon came back indetermindate. On empiric treatment with 2g IV cefepime, 400mg fluconazole and 250mg azithromycin. Persistent leukocytosis at 22.4, patient is on high dose steroids.      Lung transplant evaluation  Patient was assessed by cardiothoracic surgery who recommended lung transplant. The patient verbalized understanding of the risks and benefits and affirmed willingness to proceed. A V/P scan was done that demonstrated 53% contribution by the right lung, 47% by the left lung, no acute filling defect. An echocardiogram was done showing normal right and left ventricular function with a calculated PAP of 40 mm Hg. RHC demonstrated moderate pulmonary hypertension with normal angiogram. No additional cardiology evaluation needed.      Rheumatoid arthritis  Patient seen by rheumatology on 9/9/21. No acute flare of rheumatoid arthritis. Rheumatology does not recommend an additional treatment to methlyprednisolone at  this time, appreciate recs. SINCERE antibodies negative, flow cytometry negative for CD19 cells, CK normal. Aldolase, anti-Irene-1  pending.       Steroid induced diabetes  Patient treated with insulin sliding scale and morning glargine. Glucose once at 268mg/dL, rest of values below 200. Will continue to monitor and will increase basal coverage as needed.      Hypothyroidism   Continue 25 mg levothyroxin qday     SALTY  Continue BiPAP at night.      Hypertension  On 5mg amlodipine, borderline hypertension at 142mg SBP this morning.     Anxiety  Depression  Continue 5mg escitalopram        DVT Prophylaxis: UFH 5000U, q12h  Code Status: Full Code     Disposition: Expected discharge in 6 days once work-up is completed and oxygen requirements lower.  Disposition: Awaiting completion of lung transplant workup, pending improvement in oxygen needs.    [place] Layne    Interval History   No acute events overnight. Patient complains of poor sleep without a specific trigger. Was able to wean oxygen to 60% FiO2 at night on BiPap, now at 35LPM on high flow nc oxygen.    -Data reviewed today: I reviewed all new labs and imaging results over the last 24 hours.    Physical Exam   Temp: 98.3  F (36.8  C) Temp src: Oral BP: 137/73 Pulse: 73   Resp: 18 SpO2: 92 % O2 Device: High Flow Nasal Cannula (HFNC) Oxygen Delivery: 40 LPM  Vitals:    09/06/21 0340 09/07/21 0400 09/08/21 0626   Weight: 72.9 kg (160 lb 11.5 oz) 72.6 kg (160 lb) 68.7 kg (151 lb 8 oz)     Vital Signs with Ranges  Temp:  [97.4  F (36.3  C)-98.5  F (36.9  C)] 98.3  F (36.8  C)  Pulse:  [64-87] 73  Resp:  [10-26] 18  BP: (123-166)/(72-88) 137/73  FiO2 (%):  [60 %-100 %] 90 %  SpO2:  [89 %-95 %] 92 %  I/O last 3 completed shifts:  In: 871.25 [P.O.:400; I.V.:471.25]  Out: 1850 [Urine:1850]    Constitutional: AAOx3, NAD, rests at bed, high flow nc oxygen in place  Respiratory: On high flow nc oxygen, coarse breathing sounds with diffuse crackles on both lung fields, no wheezing  Cardiovascular: RRR, no r/m/g, S1, S2  GI: Diffuse ecchymosis at the sites of heparin injection, no tenderness, no  rebound, no guarding  Skin/Integumen: Trace lower extremity edema, no joint swelling or reduced range of motion    Medications     - MEDICATION INSTRUCTIONS -       - MEDICATION INSTRUCTIONS -         amLODIPine  5 mg Oral Daily     azithromycin  250 mg Oral Daily     ceFEPIme (MAXIPIME) IV  2 g Intravenous Q8H     escitalopram  5 mg Oral QPM     fluconazole  400 mg Intravenous Q24H     fluticasone  1 puff Inhalation Daily     heparin ANTICOAGULANT  5,000 Units Subcutaneous Q12H     insulin aspart   Subcutaneous TID AC     insulin aspart  1-7 Units Subcutaneous TID AC     insulin aspart  1-5 Units Subcutaneous At Bedtime     insulin glargine  5 Units Subcutaneous QAM AC     levothyroxine  25 mcg Oral QAM AC     melatonin  3 mg Oral At Bedtime     methylPREDNISolone  125 mg Intravenous Q6H     pantoprazole  40 mg Oral BID AC     sodium chloride (PF)  3 mL Intracatheter Q8H     sulfamethoxazole-trimethoprim  3 tablet Oral TID       Data   Recent Labs   Lab 09/09/21  0741 09/09/21  0536 09/08/21  2151 09/08/21  1631 09/08/21  1630 09/08/21  0626 09/07/21  1324   WBC  --  22.4*  --   --   --  23.0* 27.9*   HGB  --  10.0*  --  10.4* 10.6* 9.6* 10.9*   MCV  --  87  --   --   --  86 88   PLT  --  131*  --   --   --  126* 153   INR  --   --   --   --   --  1.19* 1.16*   NA  --  137  --   --   --  138 138   POTASSIUM  --  4.0  --   --   --  4.3 3.9   CHLORIDE  --  103  --   --   --  107 107   CO2  --  25  --   --   --  26 25   BUN  --  19  --   --   --  22 23   CR  --  0.65*  --   --   --  0.76 0.69   ANIONGAP  --  9  --   --   --  5 6   ERIK  --  8.7  --   --   --  8.6 8.8   * 149* 268*  --   --  160* 151*   ALBUMIN  --   --   --   --   --   --  2.4*   PROTTOTAL  --   --   --   --   --   --  5.6*   BILITOTAL  --   --   --   --   --   --  0.2   ALKPHOS  --   --   --   --   --   --  67   ALT  --   --   --   --   --   --  39   AST  --   --   --   --   --   --  30       Recent Results (from the past 24 hour(s))   Cardiac  Catheterization    Narrative      Right sided filling pressures are mildly elevated.    Moderately elevated pulmonary artery hypertension.    Left sided filling pressures are normal.    Normal cardiac output level.    Normal coronary arteries with mild tortuosity          Sherri Layne MD

## 2021-09-09 NOTE — PLAN OF CARE
"Care for from [id number]:      BP (!) 149/78 (BP Location: Left arm)   Pulse 81   Temp 97.5  F (36.4  C) (Oral)   Resp 14   Ht 1.626 m (5' 4\")   Wt 68.7 kg (151 lb 8 oz)   SpO2 93%   BMI 26.00 kg/m        Neuro: A&Ox4. Pt has numerous care confernces today with family involvement via IPad.  Pt calls needed.    Cardiac: SR. VSS.   Respiratory: Sating 88%>on HFNC at % on 40 LPM.  Bipap as needed and with sleep.  GI/: Adequate urine output into urinal. BM X 1 into bedpan.  Diet/appetite: Tolerating regular diet. Eating well.  Activity:  Assist of 2, up to chair and in halls.  Pain: At acceptable level on current regimen.   Skin: No new deficits noted.  LDA's:PICC    Plan: Continue with POC. Notify primary team with changes.    "

## 2021-09-10 ENCOUNTER — APPOINTMENT (OUTPATIENT)
Dept: PHYSICAL THERAPY | Facility: CLINIC | Age: 56
End: 2021-09-10
Attending: STUDENT IN AN ORGANIZED HEALTH CARE EDUCATION/TRAINING PROGRAM
Payer: COMMERCIAL

## 2021-09-10 LAB
ANION GAP SERPL CALCULATED.3IONS-SCNC: 8 MMOL/L (ref 3–14)
BUN SERPL-MCNC: 21 MG/DL (ref 7–30)
CALCIUM SERPL-MCNC: 8.4 MG/DL (ref 8.5–10.1)
CHLORIDE BLD-SCNC: 103 MMOL/L (ref 94–109)
CO2 SERPL-SCNC: 24 MMOL/L (ref 20–32)
CREAT SERPL-MCNC: 0.61 MG/DL (ref 0.66–1.25)
ENA JO1 AB SER IA-ACNC: <0.5 U/ML
ENA JO1 IGG SER-ACNC: NEGATIVE
ERYTHROCYTE [DISTWIDTH] IN BLOOD BY AUTOMATED COUNT: 15.8 % (ref 10–15)
GFR SERPL CREATININE-BSD FRML MDRD: >90 ML/MIN/1.73M2
GLUCOSE BLD-MCNC: 204 MG/DL (ref 70–99)
GLUCOSE BLDC GLUCOMTR-MCNC: 173 MG/DL (ref 70–99)
GLUCOSE BLDC GLUCOMTR-MCNC: 183 MG/DL (ref 70–99)
GLUCOSE BLDC GLUCOMTR-MCNC: 184 MG/DL (ref 70–99)
GLUCOSE BLDC GLUCOMTR-MCNC: 210 MG/DL (ref 70–99)
HCT VFR BLD AUTO: 30.6 % (ref 40–53)
HGB BLD-MCNC: 9.3 G/DL (ref 13.3–17.7)
MCH RBC QN AUTO: 28.2 PG (ref 26.5–33)
MCHC RBC AUTO-ENTMCNC: 30.4 G/DL (ref 31.5–36.5)
MCV RBC AUTO: 93 FL (ref 78–100)
PLATELET # BLD AUTO: 132 10E3/UL (ref 150–450)
POTASSIUM BLD-SCNC: 4.3 MMOL/L (ref 3.4–5.3)
RBC # BLD AUTO: 3.3 10E6/UL (ref 4.4–5.9)
SODIUM SERPL-SCNC: 135 MMOL/L (ref 133–144)
WBC # BLD AUTO: 25.1 10E3/UL (ref 4–11)

## 2021-09-10 PROCEDURE — 94660 CPAP INITIATION&MGMT: CPT

## 2021-09-10 PROCEDURE — 250N000011 HC RX IP 250 OP 636: Performed by: STUDENT IN AN ORGANIZED HEALTH CARE EDUCATION/TRAINING PROGRAM

## 2021-09-10 PROCEDURE — 97110 THERAPEUTIC EXERCISES: CPT | Mod: GP

## 2021-09-10 PROCEDURE — 250N000013 HC RX MED GY IP 250 OP 250 PS 637: Performed by: INTERNAL MEDICINE

## 2021-09-10 PROCEDURE — 80048 BASIC METABOLIC PNL TOTAL CA: CPT | Performed by: STUDENT IN AN ORGANIZED HEALTH CARE EDUCATION/TRAINING PROGRAM

## 2021-09-10 PROCEDURE — 250N000013 HC RX MED GY IP 250 OP 250 PS 637: Performed by: STUDENT IN AN ORGANIZED HEALTH CARE EDUCATION/TRAINING PROGRAM

## 2021-09-10 PROCEDURE — 36592 COLLECT BLOOD FROM PICC: CPT | Performed by: STUDENT IN AN ORGANIZED HEALTH CARE EDUCATION/TRAINING PROGRAM

## 2021-09-10 PROCEDURE — 99233 SBSQ HOSP IP/OBS HIGH 50: CPT | Mod: GC | Performed by: STUDENT IN AN ORGANIZED HEALTH CARE EDUCATION/TRAINING PROGRAM

## 2021-09-10 PROCEDURE — 250N000013 HC RX MED GY IP 250 OP 250 PS 637: Performed by: PHYSICIAN ASSISTANT

## 2021-09-10 PROCEDURE — 120N000003 HC R&B IMCU UMMC

## 2021-09-10 PROCEDURE — 85027 COMPLETE CBC AUTOMATED: CPT | Performed by: STUDENT IN AN ORGANIZED HEALTH CARE EDUCATION/TRAINING PROGRAM

## 2021-09-10 PROCEDURE — 999N000157 HC STATISTIC RCP TIME EA 10 MIN

## 2021-09-10 PROCEDURE — 999N000157 HC STATISTIC RCP TIME EA 10 MIN: Performed by: PEDIATRICS

## 2021-09-10 PROCEDURE — 250N000011 HC RX IP 250 OP 636: Performed by: PHYSICIAN ASSISTANT

## 2021-09-10 PROCEDURE — 99233 SBSQ HOSP IP/OBS HIGH 50: CPT | Performed by: INTERNAL MEDICINE

## 2021-09-10 PROCEDURE — 99233 SBSQ HOSP IP/OBS HIGH 50: CPT | Mod: GC | Performed by: INTERNAL MEDICINE

## 2021-09-10 RX ORDER — METHYLPREDNISOLONE SODIUM SUCCINATE 125 MG/2ML
125 INJECTION, POWDER, LYOPHILIZED, FOR SOLUTION INTRAMUSCULAR; INTRAVENOUS DAILY
Status: COMPLETED | OUTPATIENT
Start: 2021-09-12 | End: 2021-09-14

## 2021-09-10 RX ADMIN — AZITHROMYCIN MONOHYDRATE 250 MG: 250 TABLET ORAL at 08:30

## 2021-09-10 RX ADMIN — Medication 3 MG: at 20:43

## 2021-09-10 RX ADMIN — SULFAMETHOXAZOLE AND TRIMETHOPRIM 1 TABLET: 800; 160 TABLET ORAL at 08:30

## 2021-09-10 RX ADMIN — INSULIN GLARGINE 5 UNITS: 100 INJECTION, SOLUTION SUBCUTANEOUS at 08:29

## 2021-09-10 RX ADMIN — PANTOPRAZOLE SODIUM 40 MG: 40 TABLET, DELAYED RELEASE ORAL at 08:30

## 2021-09-10 RX ADMIN — AMLODIPINE BESYLATE 5 MG: 5 TABLET ORAL at 08:30

## 2021-09-10 RX ADMIN — LEVOTHYROXINE SODIUM 25 MCG: 0.03 TABLET ORAL at 08:30

## 2021-09-10 RX ADMIN — FLUTICASONE FUROATE 1 PUFF: 100 POWDER RESPIRATORY (INHALATION) at 08:32

## 2021-09-10 RX ADMIN — CEFEPIME HYDROCHLORIDE 2 G: 2 INJECTION, POWDER, FOR SOLUTION INTRAVENOUS at 04:39

## 2021-09-10 RX ADMIN — INSULIN ASPART 4 UNITS: 100 INJECTION, SOLUTION INTRAVENOUS; SUBCUTANEOUS at 14:10

## 2021-09-10 RX ADMIN — ESCITALOPRAM OXALATE 5 MG: 5 TABLET, FILM COATED ORAL at 20:42

## 2021-09-10 RX ADMIN — HEPARIN SODIUM 5000 UNITS: 5000 INJECTION, SOLUTION INTRAVENOUS; SUBCUTANEOUS at 08:29

## 2021-09-10 RX ADMIN — CHOLESTYRAMINE 8 G: 4 POWDER, FOR SUSPENSION ORAL at 08:30

## 2021-09-10 RX ADMIN — CHOLESTYRAMINE 8 G: 4 POWDER, FOR SUSPENSION ORAL at 14:10

## 2021-09-10 RX ADMIN — INSULIN ASPART 3 UNITS: 100 INJECTION, SOLUTION INTRAVENOUS; SUBCUTANEOUS at 19:43

## 2021-09-10 RX ADMIN — CHOLESTYRAMINE 8 G: 4 POWDER, FOR SUSPENSION ORAL at 20:43

## 2021-09-10 RX ADMIN — CEFEPIME HYDROCHLORIDE 2 G: 2 INJECTION, POWDER, FOR SOLUTION INTRAVENOUS at 20:43

## 2021-09-10 RX ADMIN — METHYLPREDNISOLONE SODIUM SUCCINATE 125 MG: 125 INJECTION, POWDER, FOR SOLUTION INTRAMUSCULAR; INTRAVENOUS at 20:42

## 2021-09-10 RX ADMIN — INSULIN ASPART 5 UNITS: 100 INJECTION, SOLUTION INTRAVENOUS; SUBCUTANEOUS at 09:13

## 2021-09-10 RX ADMIN — FLUCONAZOLE 400 MG: 200 TABLET ORAL at 20:42

## 2021-09-10 RX ADMIN — PANTOPRAZOLE SODIUM 40 MG: 40 TABLET, DELAYED RELEASE ORAL at 17:39

## 2021-09-10 RX ADMIN — CEFEPIME HYDROCHLORIDE 2 G: 2 INJECTION, POWDER, FOR SOLUTION INTRAVENOUS at 13:10

## 2021-09-10 RX ADMIN — HEPARIN SODIUM 5000 UNITS: 5000 INJECTION, SOLUTION INTRAVENOUS; SUBCUTANEOUS at 20:42

## 2021-09-10 RX ADMIN — METHYLPREDNISOLONE SODIUM SUCCINATE 125 MG: 125 INJECTION, POWDER, FOR SOLUTION INTRAMUSCULAR; INTRAVENOUS at 08:30

## 2021-09-10 ASSESSMENT — ACTIVITIES OF DAILY LIVING (ADL)
ADLS_ACUITY_SCORE: 19

## 2021-09-10 ASSESSMENT — MIFFLIN-ST. JEOR: SCORE: 1421.85

## 2021-09-10 NOTE — PROGRESS NOTES
Lung transplant selection committee results:  The patient's history and transplantation evaluation was reviewed by the lung transplantation selection committee including transplant surgeons, transplant pulmonologist, coordinators, pharmacist, dietitian and .  Transplant evaluation results notable for left heart catheterization with no significant coronary artery disease.  Right heart catheterization with moderate pulmonary hypertension with PA 42/20, wedge pressure 13, cardiac output 5.1.  G6PD mildly elevated at 21.  IgG low at 363.  Blood type A positive. Toxo (-). HSV Ab(+). EBV Ab (+). CMV (-). Vit D low at 15. PRA 0.  Based on the discussion in conference and subsequent social work assessment, the patient is appropriate for lung transplantation.  Steroids will need to be  tapered to prednisone 20 mg/day or less to be activated on the transplant list.  Proceed with prednisone taper as recommended by the pulmonary consult service.  While awaiting lung transplantation recommend:  -Nutritional supplements as needed to maintain optimal nutrition in anticipation of transplantation.  -Physical therapy to maintain muscle mass while awaiting lung transplantation.  -Vitamin D supplementation.  -If the patient has disease progression, recommend proceeding to intubation, mechanical ventilation and consideration for ECMO as a bridge to transplant.    Abiodun Woods MD

## 2021-09-10 NOTE — PROGRESS NOTES
[facility]    Progress Note    Date of Service (when I saw the patient): 09/10/2021    Assessment & Plan     Edson Thornton is a 56 year old male with history of NSIP associated with rheumatoid arthritis and traction bronchiectasis who was admitted on 9/5/2021 with acute on chronic hypoxic respiratory failure .  The patient is oxygen dependent.      Acute hypoxic respiratory failure  Per pulmonary recommendation, BiPAP used at night, interchanged with high flow nc oxygen during the day.Currently on treatment with IV methylprednisolone 125mg q8h. Inhaled fluticasone daily. Lung transplant team evaluated the patient and recommended tapering down steroids to 20mg qday prior to transplant, with low threshold for intubation and mechanical ventilation if disease progresses. ECMO will be used as bridge to transplant if needed. On PCP prophylaxis with TMP/SMX. High resolution CT scan demonstrated bilateral ground glass opacities and mediastinal lymphadenopathy. Infectious work-up has been so far negative, quantiferon came back indetermindate. On empiric treatment with 2g IV cefepime, 400mg fluconazole and 250mg azithromycin. Persistent leukocytosis at 25.1, patient is on high dose steroids.      Lung transplant evaluation  Patient was assessed by cardiothoracic surgery who recommended lung transplant. The patient verbalized understanding of the risks and benefits and affirmed willingness to proceed. A V/P scan was done that demonstrated 53% contribution by the right lung, 47% by the left lung, no acute filling defect. An echocardiogram was done showing normal right and left ventricular function with a calculated PAP of 40 mm Hg. RHC demonstrated moderate pulmonary hypertension with normal angiogram. Prednisone will need to be tapered down to 20mg qday, with low threshold for intubation, mechanical ventilation and ECMO as bridge to transplant if disease progresses. Patient  yesterday received total of 375mg methylprednisolone IV, taper to BID today. Will go down to oral prednisone 60 mg tab on 9/15/21. Risks and benefits of tapering have been discussed with the patient, who is willing to proceed.      Rheumatoid arthritis  Patient seen by rheumatology on 9/9/21. No acute flare of rheumatoid arthritis. SINCERE antibodies negative, flow cytometry negative for CD19 cells, CK normal, aldolase normal. Anti-Irene-1 pending. The patient was treated with leflunomide from 5/21 to 7/21. Rheumatology considers that leflunomide toxicity might contribute to the patient's ILD. Strated on cholestyramine TID to increase leflunomide clearance.      Steroid induced diabetes  Patient treated with insulin sliding scale and morning glargine. Glucose once at 204 mg/dL, rest of values below 200. Will continue to monitor and will increase basal coverage as needed.      Hypothyroidism   Continue 25 mg levothyroxin qday     SALTY  Continue BiPAP at night.      Hypertension  On 5mg amlodipine, BP well controlled today.     Anxiety  Depression  Continue 5mg escitalopram        DVT Prophylaxis: UFH 5000U, q12h  Code Status: Full Code    Disposition: Pending prednisone taper, anticipate prolonged hospitalization due to potential disease progression and lung transplant.    Sherri Layne    Interval History   No acute events overnight.    -Data reviewed today: I reviewed all new labs and imaging results over the last 24 hours.  Physical Exam   Temp: 98.2  F (36.8  C) Temp src: Axillary BP: 134/77 Pulse: 61   Resp: 29 SpO2: 96 % O2 Device: BiPAP/CPAP Oxygen Delivery: 40 LPM  Vitals:    09/07/21 0400 09/08/21 0626 09/10/21 0400   Weight: 72.6 kg (160 lb) 68.7 kg (151 lb 8 oz) 68.1 kg (150 lb 1.6 oz)     Vital Signs with Ranges  Temp:  [97.4  F (36.3  C)-98.2  F (36.8  C)] 98.2  F (36.8  C)  Pulse:  [61-81] 61  Resp:  [14-33] 29  BP: (134-153)/(77-80) 134/77  FiO2 (%):  [65 %-100 %] 65 %  SpO2:  [92 %-97 %] 96 %  I/O last  3 completed shifts:  In: 360 [P.O.:250; I.V.:110]  Out: 1550 [Urine:1550]    Constitutional:   Increased work of breathing, AAOx3, NAD  Respiratory: On high flow nc oxygen, coarse breathing sounds with diffuse crackles on both lung fields  Cardiovascular: RRR, no r/m/g, S1, S2  GI: Diffuse ecchymosis at the sites of heparin injection, no tenderness, no rebound, no guarding  Skin/Integumen: Trace lower extremity edema, no joint swelling or reduced range of motion. Trace lower extremity edema, no joint swelling or reduced range of motion.    Medications     - MEDICATION INSTRUCTIONS -       - MEDICATION INSTRUCTIONS -         amLODIPine  5 mg Oral Daily     azithromycin  250 mg Oral Daily     ceFEPIme (MAXIPIME) IV  2 g Intravenous Q8H     cholestyramine  2 packet Oral TID     escitalopram  5 mg Oral QPM     fluconazole  400 mg Oral Daily     fluticasone  1 puff Inhalation Daily     heparin ANTICOAGULANT  5,000 Units Subcutaneous Q12H     insulin aspart   Subcutaneous TID AC     insulin aspart  1-7 Units Subcutaneous TID AC     insulin aspart  1-5 Units Subcutaneous At Bedtime     insulin glargine  5 Units Subcutaneous QAM AC     levothyroxine  25 mcg Oral QAM AC     melatonin  3 mg Oral At Bedtime     methylPREDNISolone  125 mg Intravenous Q12H     pantoprazole  40 mg Oral BID AC     sodium chloride (PF)  3 mL Intracatheter Q8H     sulfamethoxazole-trimethoprim  1 tablet Oral Once per day on Mon Wed Fri       Data   Recent Labs   Lab 09/10/21  0616 09/09/21  2211 09/09/21  2120 09/09/21  0536 09/08/21  1631 09/08/21  0743 09/08/21  0626 09/07/21  1324   WBC 25.1*  --   --  22.4*  --   --  23.0* 27.9*   HGB 9.3*  --   --  10.0* 10.4*   < > 9.6* 10.9*   MCV 93  --   --  87  --   --  86 88   *  --   --  131*  --   --  126* 153   INR  --   --   --   --   --   --  1.19* 1.16*     --   --  137  --   --  138 138   POTASSIUM 4.3  --   --  4.0  --   --  4.3 3.9   CHLORIDE 103  --   --  103  --   --  107 107   CO2  24  --   --  25  --   --  26 25   BUN 21  --   --  19  --   --  22 23   CR 0.61*  --   --  0.65*  --   --  0.76 0.69   ANIONGAP 8  --   --  9  --   --  5 6   ERIK 8.4*  --   --  8.7  --   --  8.6 8.8   * 161* 168* 149*  --   --  160* 151*   ALBUMIN  --   --   --   --   --   --   --  2.4*   PROTTOTAL  --   --   --   --   --   --   --  5.6*   BILITOTAL  --   --   --   --   --   --   --  0.2   ALKPHOS  --   --   --   --   --   --   --  67   ALT  --   --   --   --   --   --   --  39   AST  --   --   --   --   --   --   --  30    < > = values in this interval not displayed.       No results found for this or any previous visit (from the past 24 hour(s)).    Sherri Layne MD

## 2021-09-10 NOTE — PLAN OF CARE
Neuro: A&Ox4. Calm and cooperative. Able to make needs known.  Cardiac: SR. VSS. Afebrile. -153/80s  Respiratory: Sating >92% on 65% FiO2 on BiPAP. HFNC during daytime.  GI/: Adequate urine output. LBM 9/9  Diet/appetite: Tolerating general diet.   Activity:  Assist of 2 OOB, able to reposition independently in bed.  Pain: Denies  Skin: Groin erythema   LDA's: Triple lumen PICC    Plan: Continue with POC. Notify primary team with changes.

## 2021-09-10 NOTE — PROGRESS NOTES
RHEUMATOLOGY PROGRESS NOTE - FELLOW     Edson Thornton MRN# [id number]   Age: 56 year old YOB: 1965     Date of Admission: 9/5/2021  Date of initial consult: 9/6/2021    Assessment and Plan:   Summary: Edson Thornton is a 57 yo male with recent dx of seropositive RA (+RF/+CCP) with subsequent diagnosis of ILD who was admitted as transfer from OS on 9/5/21 for acute on chronic hypoxemic respiratory failure. Diagnosed with RA in May 2021 and initially started on steroids and leflunomide. Joints improved dramatically and then per interview and chart review, dyspnea drastically worsened in July. Leflunomide stopped at that time. Patient then given 1st dose of RTX on 8/6/21 and 2nd dose delayed 2/2 insurance issues, but given on 9/3/21. Inflammatory arthritis is quiet this hospitalization. Patient going through lung transplant evaluation this admission.    Interval discussion: Of note, there is a small, but known risk of drug induced ILD/pneumonitis from leflunomide. Leflunomide can continue to linger in the bile, despite not being used since mid-July. Given a chance that patient could significantly benefit from a washout, with little risk, we recommended patient complete a cholestyramine washout. This does not appear to have been done prior to presentation to [facility]. Started washout on 9/9 evening.    Problem list:  Seropositive RA complicated by ILD  Acute on Chronic Hypoxic Respiratory Failure  Hypothyroidism    Recommendations:  -- Recommend leflunomide washout using cholestyramine 8 g oral soln TID x11 days (9/9-9/20/21).  -- Agree with steroid taper as recommended by pulmonary consult team.    The patient was seen and staffed with Dr. Mello.     Toño Huang,   Rheumatology fellow  [phone]    Subjective/24 hour events:   No acute events. Continues to require HFNC during day. Denies family hx of ILD. Father with RA.            Medications:     Current Facility-Administered Medications    Medication    acetaminophen (TYLENOL) tablet 325-650 mg    amLODIPine (NORVASC) tablet 5 mg    azithromycin (ZITHROMAX) tablet 250 mg    benzocaine 20% (HURRICAINE/TOPEX) 20 % spray 0.5-1 mL    bisacodyl (DULCOLAX) Suppository 10 mg    carboxymethylcellulose PF (REFRESH PLUS) 0.5 % ophthalmic solution 1 drop    ceFEPIme (MAXIPIME) 2 g vial to attach to  ml bag for ADULTS or 50 ml bag for PEDS    cholestyramine (QUESTRAN) Packet 8 g    glucose gel 15-30 g    Or    dextrose 50 % injection 25-50 mL    Or    glucagon injection 1 mg    escitalopram (LEXAPRO) tablet 5 mg    fentaNYL (PF) (SUBLIMAZE) injection 25 mcg    fluconazole (DIFLUCAN) tablet 400 mg    fluticasone (ARNUITY ELLIPTA) 100 MCG/ACT inhaler 1 puff    heparin ANTICOAGULANT injection 5,000 Units    HOLD:  Metformin and metformin containing medications if patient received IV contrast with acute kidney injury or severe chronic kidney disease (stage IV or stage V; i.e., eGFR less than 30)    insulin aspart (NovoLOG) injection (RAPID ACTING)    insulin aspart (NovoLOG) injection (RAPID ACTING)    insulin aspart (NovoLOG) injection (RAPID ACTING)    insulin glargine (LANTUS PEN) injection 5 Units    ipratropium - albuterol 0.5 mg/2.5 mg/3 mL (DUONEB) neb solution 3 mL    Or    ipratropium - albuterol 0.5 mg/2.5 mg/3 mL (DUONEB) neb solution 3 mL    levothyroxine (SYNTHROID/LEVOTHROID) tablet 25 mcg    lidocaine (LMX4) cream    lidocaine 1 % 0.1-1 mL    melatonin tablet 1 mg    melatonin tablet 3 mg    [START ON 9/12/2021] methylPREDNISolone sodium succinate (solu-MEDROL) injection 125 mg    methylPREDNISolone sodium succinate (solu-MEDROL) injection 125 mg    midazolam (VERSED) injection 0.5 mg    No lozenges or gum should be given while patient on BIPAP/AVAPS/AVAPS AE    ondansetron (ZOFRAN-ODT) ODT tab 4 mg    Or    ondansetron (ZOFRAN) injection 4 mg    oxyCODONE (ROXICODONE) tablet 5 mg    Or    oxyCODONE IR (ROXICODONE) tablet 10 mg    pantoprazole  "(PROTONIX) EC tablet 40 mg    Patient may continue current oral medications    polyethylene glycol (MIRALAX) Packet 17 g    [START ON 9/15/2021] predniSONE (DELTASONE) tablet 60 mg    sodium chloride (PF) 0.9% PF flush 3 mL    sodium chloride (PF) 0.9% PF flush 3 mL    sulfamethoxazole-trimethoprim (BACTRIM DS) 800-160 MG per tablet 1 tablet            Review of Systems:   Complete 8 point ROS completed and negative unless mentioned in subjective.          Physical Exam:   /68 (BP Location: Left arm)   Pulse 64   Temp 98.3  F (36.8  C) (Axillary)   Resp 24   Ht 1.626 m (5' 4\")   Wt 68.1 kg (150 lb 1.6 oz)   SpO2 95%   BMI 25.76 kg/m      General: NAD, sitting in bed, on HFNC  HEENT: PERRL, EOMI  Lymph: No appreciable lymphadenopathy.  CV: RRR  Lung: Crackles in b/l bases, does speak in full sentences  Abdomen: soft, non-tender, non-distended  Ext: No peripheral edema  Msk: no chronic RA deformities, no synovitis on exam of shoulders, elbows, wrists, MCP/PIP/DIPs, knees, ankles, and MTPs          Data:   Labs and imaging reviewed.     CD-19: <1  Aldolase: 6.5  SINCERE panel: negative  CK: normal  Irene 1: negative    Toño Huang DO  Rheumatology fellow  [phone]    I saw the patient with the fellow.  My exam and recomendations are as described.      The rapidity of his ILD progression is impressive, and unusual, and raises the possibility of it being related to Leflunomide exposure, given a strong temporal relationship. If that is the case, injury could persist/progress, given long-term enterohepatic recirculation ofthis medication unless it is removed, which can be accomplished with cholestyramine washout, which he is tolerating. Hopefully this can improve his pulmonary status. Will follow.     Hammad Mello MD  [phone]       "

## 2021-09-10 NOTE — PROGRESS NOTES
Brief SW Note:     Bedside RN notified SW at 1615 that Edson has ACP docs that are completed but need notarization.  At this time, [employer] is not available for [facility] services so SW will handoff to 6B SW to complete on Monday 6/13.  Bedside RN will inform Edson and his SO that SW will be in contact with them Monday to support document completion.  ACP documents will be left at bedside.    SW will continue to follow.     KD Hardin, MSW  Social Work Services, 6B support   [facility]  Direct: [phone] Pager: [phone]

## 2021-09-11 LAB
ANION GAP SERPL CALCULATED.3IONS-SCNC: 7 MMOL/L (ref 3–14)
BUN SERPL-MCNC: 20 MG/DL (ref 7–30)
CALCIUM SERPL-MCNC: 8.2 MG/DL (ref 8.5–10.1)
CHLORIDE BLD-SCNC: 103 MMOL/L (ref 94–109)
CO2 SERPL-SCNC: 24 MMOL/L (ref 20–32)
CREAT SERPL-MCNC: 0.56 MG/DL (ref 0.66–1.25)
CRP SERPL-MCNC: <2.9 MG/L (ref 0–8)
ERYTHROCYTE [DISTWIDTH] IN BLOOD BY AUTOMATED COUNT: 15.4 % (ref 10–15)
GFR SERPL CREATININE-BSD FRML MDRD: >90 ML/MIN/1.73M2
GLUCOSE BLD-MCNC: 274 MG/DL (ref 70–99)
GLUCOSE BLDC GLUCOMTR-MCNC: 163 MG/DL (ref 70–99)
GLUCOSE BLDC GLUCOMTR-MCNC: 171 MG/DL (ref 70–99)
GLUCOSE BLDC GLUCOMTR-MCNC: 202 MG/DL (ref 70–99)
GLUCOSE BLDC GLUCOMTR-MCNC: 252 MG/DL (ref 70–99)
HCT VFR BLD AUTO: 29.3 % (ref 40–53)
HGB BLD-MCNC: 9.3 G/DL (ref 13.3–17.7)
LACTATE SERPL-SCNC: 1 MMOL/L (ref 0.7–2)
MCH RBC QN AUTO: 28.4 PG (ref 26.5–33)
MCHC RBC AUTO-ENTMCNC: 31.7 G/DL (ref 31.5–36.5)
MCV RBC AUTO: 90 FL (ref 78–100)
PLATELET # BLD AUTO: 123 10E3/UL (ref 150–450)
POTASSIUM BLD-SCNC: 4.1 MMOL/L (ref 3.4–5.3)
RBC # BLD AUTO: 3.27 10E6/UL (ref 4.4–5.9)
SODIUM SERPL-SCNC: 134 MMOL/L (ref 133–144)
WBC # BLD AUTO: 22.8 10E3/UL (ref 4–11)

## 2021-09-11 PROCEDURE — 36592 COLLECT BLOOD FROM PICC: CPT | Performed by: STUDENT IN AN ORGANIZED HEALTH CARE EDUCATION/TRAINING PROGRAM

## 2021-09-11 PROCEDURE — 85027 COMPLETE CBC AUTOMATED: CPT | Performed by: STUDENT IN AN ORGANIZED HEALTH CARE EDUCATION/TRAINING PROGRAM

## 2021-09-11 PROCEDURE — 250N000011 HC RX IP 250 OP 636: Performed by: STUDENT IN AN ORGANIZED HEALTH CARE EDUCATION/TRAINING PROGRAM

## 2021-09-11 PROCEDURE — 250N000013 HC RX MED GY IP 250 OP 250 PS 637: Performed by: INTERNAL MEDICINE

## 2021-09-11 PROCEDURE — 94660 CPAP INITIATION&MGMT: CPT

## 2021-09-11 PROCEDURE — 250N000013 HC RX MED GY IP 250 OP 250 PS 637: Performed by: PHYSICIAN ASSISTANT

## 2021-09-11 PROCEDURE — 99232 SBSQ HOSP IP/OBS MODERATE 35: CPT | Mod: GC | Performed by: INTERNAL MEDICINE

## 2021-09-11 PROCEDURE — 250N000013 HC RX MED GY IP 250 OP 250 PS 637: Performed by: STUDENT IN AN ORGANIZED HEALTH CARE EDUCATION/TRAINING PROGRAM

## 2021-09-11 PROCEDURE — 120N000003 HC R&B IMCU UMMC

## 2021-09-11 PROCEDURE — 83605 ASSAY OF LACTIC ACID: CPT | Performed by: STUDENT IN AN ORGANIZED HEALTH CARE EDUCATION/TRAINING PROGRAM

## 2021-09-11 PROCEDURE — 80048 BASIC METABOLIC PNL TOTAL CA: CPT | Performed by: STUDENT IN AN ORGANIZED HEALTH CARE EDUCATION/TRAINING PROGRAM

## 2021-09-11 PROCEDURE — 999N000157 HC STATISTIC RCP TIME EA 10 MIN

## 2021-09-11 PROCEDURE — 86140 C-REACTIVE PROTEIN: CPT | Performed by: STUDENT IN AN ORGANIZED HEALTH CARE EDUCATION/TRAINING PROGRAM

## 2021-09-11 PROCEDURE — 250N000012 HC RX MED GY IP 250 OP 636 PS 637: Performed by: INTERNAL MEDICINE

## 2021-09-11 PROCEDURE — 250N000011 HC RX IP 250 OP 636: Performed by: PHYSICIAN ASSISTANT

## 2021-09-11 PROCEDURE — 999N000007 HC SITE CHECK

## 2021-09-11 PROCEDURE — 99233 SBSQ HOSP IP/OBS HIGH 50: CPT | Performed by: STUDENT IN AN ORGANIZED HEALTH CARE EDUCATION/TRAINING PROGRAM

## 2021-09-11 RX ORDER — MYCOPHENOLATE MOFETIL 500 MG/1
500 TABLET ORAL
Status: COMPLETED | OUTPATIENT
Start: 2021-09-11 | End: 2021-09-18

## 2021-09-11 RX ADMIN — LEVOTHYROXINE SODIUM 25 MCG: 0.03 TABLET ORAL at 09:28

## 2021-09-11 RX ADMIN — ESCITALOPRAM OXALATE 5 MG: 5 TABLET, FILM COATED ORAL at 20:12

## 2021-09-11 RX ADMIN — CEFEPIME HYDROCHLORIDE 2 G: 2 INJECTION, POWDER, FOR SOLUTION INTRAVENOUS at 12:54

## 2021-09-11 RX ADMIN — CEFEPIME HYDROCHLORIDE 2 G: 2 INJECTION, POWDER, FOR SOLUTION INTRAVENOUS at 20:12

## 2021-09-11 RX ADMIN — INSULIN ASPART 7 UNITS: 100 INJECTION, SOLUTION INTRAVENOUS; SUBCUTANEOUS at 11:33

## 2021-09-11 RX ADMIN — AMLODIPINE BESYLATE 5 MG: 5 TABLET ORAL at 09:30

## 2021-09-11 RX ADMIN — CEFEPIME HYDROCHLORIDE 2 G: 2 INJECTION, POWDER, FOR SOLUTION INTRAVENOUS at 05:10

## 2021-09-11 RX ADMIN — FLUTICASONE FUROATE 1 PUFF: 100 POWDER RESPIRATORY (INHALATION) at 09:27

## 2021-09-11 RX ADMIN — PANTOPRAZOLE SODIUM 40 MG: 40 TABLET, DELAYED RELEASE ORAL at 17:10

## 2021-09-11 RX ADMIN — CHOLESTYRAMINE 8 G: 4 POWDER, FOR SUSPENSION ORAL at 14:13

## 2021-09-11 RX ADMIN — PANTOPRAZOLE SODIUM 40 MG: 40 TABLET, DELAYED RELEASE ORAL at 09:28

## 2021-09-11 RX ADMIN — MYCOPHENOLATE MOFETIL 500 MG: 500 TABLET, FILM COATED ORAL at 17:52

## 2021-09-11 RX ADMIN — CHOLESTYRAMINE 8 G: 4 POWDER, FOR SUSPENSION ORAL at 20:13

## 2021-09-11 RX ADMIN — INSULIN ASPART 6 UNITS: 100 INJECTION, SOLUTION INTRAVENOUS; SUBCUTANEOUS at 20:11

## 2021-09-11 RX ADMIN — METHYLPREDNISOLONE SODIUM SUCCINATE 125 MG: 125 INJECTION, POWDER, FOR SOLUTION INTRAMUSCULAR; INTRAVENOUS at 20:11

## 2021-09-11 RX ADMIN — AZITHROMYCIN MONOHYDRATE 250 MG: 250 TABLET ORAL at 09:28

## 2021-09-11 RX ADMIN — Medication 3 MG: at 20:12

## 2021-09-11 RX ADMIN — CHOLESTYRAMINE 8 G: 4 POWDER, FOR SUSPENSION ORAL at 10:30

## 2021-09-11 RX ADMIN — METHYLPREDNISOLONE SODIUM SUCCINATE 125 MG: 125 INJECTION, POWDER, FOR SOLUTION INTRAMUSCULAR; INTRAVENOUS at 09:35

## 2021-09-11 RX ADMIN — INSULIN GLARGINE 5 UNITS: 100 INJECTION, SOLUTION SUBCUTANEOUS at 09:38

## 2021-09-11 RX ADMIN — FLUCONAZOLE 400 MG: 200 TABLET ORAL at 20:12

## 2021-09-11 RX ADMIN — HEPARIN SODIUM 5000 UNITS: 5000 INJECTION, SOLUTION INTRAVENOUS; SUBCUTANEOUS at 20:12

## 2021-09-11 RX ADMIN — HEPARIN SODIUM 5000 UNITS: 5000 INJECTION, SOLUTION INTRAVENOUS; SUBCUTANEOUS at 09:31

## 2021-09-11 ASSESSMENT — MIFFLIN-ST. JEOR: SCORE: 1426

## 2021-09-11 ASSESSMENT — ACTIVITIES OF DAILY LIVING (ADL)
ADLS_ACUITY_SCORE: 19

## 2021-09-11 NOTE — PROGRESS NOTES
"[facility] Follow up    Edson Thornton  MRN: [id number]  : 1965    Date of Admission: 2021  Date of Service: 21    Assessment:  Acute hypoxic respiratory failure  NSIP ILD  Rheumatoid arthritis (positive anti-CCP, RF)    Mr. Thornton is a 57yo gentleman with history of NSIP/ILD, RA (s/p rituxan infusion, leflunomide), SALTY, HTN transferred from OSH for worsening acute on chronic hypoxemic respiratory failure and progressive dyspnea in the setting of progressive ILD without other therapy options, who has been evaluated for lung transplant. He has been approved for listing by the transplant evaluation committee pending tapering of his steroids prior to becoming active on transplant list. He is generally asymptomatic, maintained on HFNC. Also receiving cholestyramine TID for washout of leflunomide in case this is contributing to his respiratory status.  He remains on high dose steroids, and we would like to taper as able. We will initiate mycophenolate now and monitor for response/ability to taper steroids faster.    Recommendations:  -- start mycophenolate 500mg BID now  -- continue to wean steroids, will continue to assess steroid taper plan with initiation of cellcept   - bactrim for PCP ppx while on high dose steroids  -- trend CRP  -- continues on cefepime, azithromycin, fluconazole  -- on cholestyramine for possible leflunomide pneumonitis/attempted washout    Subjective:  Feeling ok today. Working out with exercise bands on arrival. Breathing feeling comfortable. No productive cough, sometimes dry. Eating and drinking ok. Hasn't been up to walk around much, but is doing exercises from PT.    Review of systems: complete 10 point review of systems completed and negative except as noted above in HPI.    Objective:  /76 (BP Location: Left arm)   Pulse 64   Temp 97.5  F (36.4  C) (Axillary)   Resp 20   Ht 1.626 m (5' 4\")   Wt 68.5 kg (151 lb 0.2 oz)   SpO2 95%   " BMI 25.92 kg/m      Gen: in no acute distress, sitting upright in bed  HEENT: HFNC in place, no scleral injection/icterus  CV: RRR, no murmurs appreciated, extremities warm, no peripheral edema  Pulm: no increased work of breathing, fine crackles BL bases to mid lung, no wheezing  GI: soft, not distended, BS present  MSK: no gross deformities, no joint swelling  Integ: no rashes or lesions appreciated  Neuro: speech clear, alert and oriented  Psych: calm, appropriate    Data:  I have personally reviewed lab work from 9/11, most recent imaging CT chest 9/7. PFTs 7/5/21 in care everywhere, FEV1/FVC 88, FEV1 1.87 (63%pred), FVC 2.12 (55%pred), DLCO 20 (75% pred)-- interpreted as pseudorestrictive pattern with positive bronchodilator response (% change in FVC and FEV1 >12% and >200cc in both).    Kathy Esparza, DO  Pulmonary fellow  Pager: [phone]    Patient seen and discussed with Dr. Nicolas.      Attending note:  Patient seen, examined and discussed with Dr. Esparza. All data reviewed. Agree with the assessment and plan as outlined in the above note.  Will start CellCept today in hopes of being able to taper steroids more quickly so can be listed for lung transplantation. Discussed with patient at the bedside.    Yuki Nicolas MD  [phone]

## 2021-09-11 NOTE — PROGRESS NOTES
[facility]    Progress Note    Date of Service (when I saw the patient): 09/11/2021    Assessment & Plan     Edson Thornton is a 56 year old male with history of NSIP associated with rheumatoid arthritis and traction bronchiectasis who was admitted on 9/5/2021 with acute on chronic hypoxic respiratory failure likely from ILD. Undergoing transplant workup.        Acute hypoxic respiratory failure 2/2 progressive ILD  -Appreciate pulm consult  -Continue BiPAP at night, HFNC during day  -Continue empiric abx with cefepime, azithro, fluconazole  -Continue steroid wean, methylpred 125 mg daily x 3d starting 9/12, then pred 60 mg daily. Needs to be on pred 20 mg daily or less prior to eligibility to be listed.  -Start MMF per pulm     Rheumatoid arthritis  Patient seen by rheumatology this admission. No acute flare of rheumatoid arthritis. SINCERE antibodies negative, flow cytometry negative for CD19 cells, CK normal, aldolase normal. Anti-Irene-1 pending. The patient was treated with leflunomide from 5/21 to 7/21. Rheumatology considers that leflunomide toxicity might contribute to the patient's ILD. Strated on cholestyramine TID to increase leflunomide clearance.      Steroid induced diabetes  Patient treated with insulin sliding scale and morning glargine. Glucose once at 204 mg/dL, rest of values below 200. Will continue to monitor and will increase basal coverage as needed.      Hypothyroidism   Continue 25 mg levothyroxin qday     SALTY  Continue BiPAP at night.      Hypertension  On 5mg amlodipine, BP well controlled today.     Anxiety  Depression  Continue 5mg escitalopram        DVT Prophylaxis: UFH 5000U, q12h  Code Status: Full Code    Disposition: Pending prednisone taper, anticipate prolonged hospitalization due to potential disease progression and lung transplant.    Luke O'Rob    Interval History   No acute events overnight. Feels ok this morning. Breathing is about  the same. Denies abdominal pain. Participating in PT while in bed.    -Data reviewed today: I reviewed all new labs and imaging results over the last 24 hours.  Physical Exam   Temp: 97.6  F (36.4  C) Temp src: Oral BP: (!) 155/81 Pulse: 63   Resp: 20 SpO2: 92 % O2 Device: High Flow Nasal Cannula (HFNC) Oxygen Delivery: 30 LPM  Vitals:    09/08/21 0626 09/10/21 0400 09/11/21 0347   Weight: 68.7 kg (151 lb 8 oz) 68.1 kg (150 lb 1.6 oz) 68.5 kg (151 lb 0.2 oz)     Vital Signs with Ranges  Temp:  [97.5  F (36.4  C)-98.5  F (36.9  C)] 97.6  F (36.4  C)  Pulse:  [63-74] 63  Resp:  [16-20] 20  BP: (138-155)/(76-90) 155/81  FiO2 (%):  [50 %-80 %] 60 %  SpO2:  [92 %-96 %] 92 %  I/O last 3 completed shifts:  In: 1450 [P.O.:1450]  Out: 2525 [Urine:2525]    Constitutional:   Increased work of breathing, AAOx3, NAD  Respiratory: On high flow nc oxygen, coarse breathing sounds with diffuse crackles on both lung fields  Cardiovascular: RRR, no r/m/g, S1, S2  GI: nontender  Skin/Integumen: warm, no jaundice    Medications     - MEDICATION INSTRUCTIONS -       - MEDICATION INSTRUCTIONS -         amLODIPine  5 mg Oral Daily     azithromycin  250 mg Oral Daily     ceFEPIme (MAXIPIME) IV  2 g Intravenous Q8H     cholestyramine  2 packet Oral TID     escitalopram  5 mg Oral QPM     fluconazole  400 mg Oral Daily     fluticasone  1 puff Inhalation Daily     heparin ANTICOAGULANT  5,000 Units Subcutaneous Q12H     insulin aspart   Subcutaneous TID AC     insulin aspart  1-7 Units Subcutaneous TID AC     insulin aspart  1-5 Units Subcutaneous At Bedtime     insulin glargine  5 Units Subcutaneous QAM AC     levothyroxine  25 mcg Oral QAM AC     melatonin  3 mg Oral At Bedtime     [START ON 9/12/2021] methylPREDNISolone  125 mg Intravenous Daily     methylPREDNISolone  125 mg Intravenous Q12H     mycophenolate  500 mg Oral BID IS     pantoprazole  40 mg Oral BID AC     [START ON 9/15/2021] predniSONE  60 mg Oral Daily     sodium chloride  (PF)  3 mL Intracatheter Q8H     sulfamethoxazole-trimethoprim  1 tablet Oral Once per day on Mon Wed Fri       Data   Recent Labs   Lab 09/11/21  1253 09/11/21  0755 09/11/21  0448 09/10/21  0616 09/09/21  0536 09/08/21  0743 09/08/21  0626 09/07/21  1324   WBC  --   --  22.8* 25.1* 22.4*  --  23.0* 27.9*   HGB  --   --  9.3* 9.3* 10.0*   < > 9.6* 10.9*   MCV  --   --  90 93 87  --  86 88   PLT  --   --  123* 132* 131*  --  126* 153   INR  --   --   --   --   --   --  1.19* 1.16*   NA  --   --  134 135 137  --  138 138   POTASSIUM  --   --  4.1 4.3 4.0  --  4.3 3.9   CHLORIDE  --   --  103 103 103  --  107 107   CO2  --   --  24 24 25  --  26 25   BUN  --   --  20 21 19  --  22 23   CR  --   --  0.56* 0.61* 0.65*  --  0.76 0.69   ANIONGAP  --   --  7 8 9  --  5 6   ERIK  --   --  8.2* 8.4* 8.7  --  8.6 8.8   * 252* 274* 204* 149*  --  160* 151*   ALBUMIN  --   --   --   --   --   --   --  2.4*   PROTTOTAL  --   --   --   --   --   --   --  5.6*   BILITOTAL  --   --   --   --   --   --   --  0.2   ALKPHOS  --   --   --   --   --   --   --  67   ALT  --   --   --   --   --   --   --  39   AST  --   --   --   --   --   --   --  30    < > = values in this interval not displayed.       No results found for this or any previous visit (from the past 24 hour(s)).    Gume Baldwin MD

## 2021-09-11 NOTE — PLAN OF CARE
Neuro: A&Ox4.   Cardiac: SR. VSS.   Respiratory: Sating mid 90s BiPAP 55% FiO2 while sleeping, HFNC while awake % FiO2 35 LPM  GI/: Adequate urine output via urinal and no BM  Diet/appetite: Tolerating regular diet. Eating well. BG ACHS  Activity:  Assist of 1-2, up to commode  Pain: Denies pain  Skin: No new deficits noted.  LDA's: RPICC-tko and saline locked     Plan: Continue with POC. Notify primary team with changes.

## 2021-09-11 NOTE — PLAN OF CARE
"BP (!) 155/81 (BP Location: Left arm)   Pulse 63   Temp 97.6  F (36.4  C) (Oral)   Resp 20   Ht 1.626 m (5' 4\")   Wt 68.5 kg (151 lb 0.2 oz)   SpO2 92%   BMI 25.92 kg/m      VSS. SR. 60-70% HFNC, Bipap with sleep. Denies pain. A&O x4. ACHS BS-elevated today. Tolerating diet and carb coverage. Voiding adequately. No BM this shift. Turn and repositions independently. Continue to monitor.   "

## 2021-09-11 NOTE — PLAN OF CARE
Neuro: A&Ox4. Family at bedside this afternoon and evening  Pt calls needed.    Cardiac: SR. VSS.       Respiratory: Sating 88%>on HFNC at % on 35LPM.  Bipap as needed and with sleep.  GI/: Adequate urine output into urinal. BM X 1 into bedpan.  Diet/appetite: Tolerating regular diet. Eating well.  Activity:  Assist of 2, up to chair andrepo in bed  Pain: At acceptable level on current regimen.   Skin: No new deficits noted.  LDA's:PICC     Plan: Continue with POC. Notify primary team with changes.

## 2021-09-12 LAB
ANION GAP SERPL CALCULATED.3IONS-SCNC: 7 MMOL/L (ref 3–14)
BUN SERPL-MCNC: 17 MG/DL (ref 7–30)
CALCIUM SERPL-MCNC: 8.5 MG/DL (ref 8.5–10.1)
CHLORIDE BLD-SCNC: 105 MMOL/L (ref 94–109)
CO2 SERPL-SCNC: 24 MMOL/L (ref 20–32)
CREAT SERPL-MCNC: 0.57 MG/DL (ref 0.66–1.25)
ERYTHROCYTE [DISTWIDTH] IN BLOOD BY AUTOMATED COUNT: 15.8 % (ref 10–15)
GFR SERPL CREATININE-BSD FRML MDRD: >90 ML/MIN/1.73M2
GLUCOSE BLD-MCNC: 224 MG/DL (ref 70–99)
GLUCOSE BLDC GLUCOMTR-MCNC: 155 MG/DL (ref 70–99)
GLUCOSE BLDC GLUCOMTR-MCNC: 228 MG/DL (ref 70–99)
GLUCOSE BLDC GLUCOMTR-MCNC: 248 MG/DL (ref 70–99)
GLUCOSE BLDC GLUCOMTR-MCNC: 249 MG/DL (ref 70–99)
GLUCOSE BLDC GLUCOMTR-MCNC: 257 MG/DL (ref 70–99)
HCT VFR BLD AUTO: 29.8 % (ref 40–53)
HGB BLD-MCNC: 9.4 G/DL (ref 13.3–17.7)
MCH RBC QN AUTO: 28.3 PG (ref 26.5–33)
MCHC RBC AUTO-ENTMCNC: 31.5 G/DL (ref 31.5–36.5)
MCV RBC AUTO: 90 FL (ref 78–100)
PLATELET # BLD AUTO: 129 10E3/UL (ref 150–450)
POTASSIUM BLD-SCNC: 3.9 MMOL/L (ref 3.4–5.3)
RBC # BLD AUTO: 3.32 10E6/UL (ref 4.4–5.9)
SODIUM SERPL-SCNC: 136 MMOL/L (ref 133–144)
WBC # BLD AUTO: 23.9 10E3/UL (ref 4–11)

## 2021-09-12 PROCEDURE — 250N000011 HC RX IP 250 OP 636: Performed by: STUDENT IN AN ORGANIZED HEALTH CARE EDUCATION/TRAINING PROGRAM

## 2021-09-12 PROCEDURE — 250N000011 HC RX IP 250 OP 636: Performed by: PHYSICIAN ASSISTANT

## 2021-09-12 PROCEDURE — 250N000013 HC RX MED GY IP 250 OP 250 PS 637: Performed by: INTERNAL MEDICINE

## 2021-09-12 PROCEDURE — 94660 CPAP INITIATION&MGMT: CPT

## 2021-09-12 PROCEDURE — 120N000003 HC R&B IMCU UMMC

## 2021-09-12 PROCEDURE — 250N000013 HC RX MED GY IP 250 OP 250 PS 637: Performed by: STUDENT IN AN ORGANIZED HEALTH CARE EDUCATION/TRAINING PROGRAM

## 2021-09-12 PROCEDURE — 99233 SBSQ HOSP IP/OBS HIGH 50: CPT | Performed by: STUDENT IN AN ORGANIZED HEALTH CARE EDUCATION/TRAINING PROGRAM

## 2021-09-12 PROCEDURE — 999N000157 HC STATISTIC RCP TIME EA 10 MIN

## 2021-09-12 PROCEDURE — 250N000012 HC RX MED GY IP 250 OP 636 PS 637: Performed by: INTERNAL MEDICINE

## 2021-09-12 PROCEDURE — 85027 COMPLETE CBC AUTOMATED: CPT | Performed by: STUDENT IN AN ORGANIZED HEALTH CARE EDUCATION/TRAINING PROGRAM

## 2021-09-12 PROCEDURE — 80048 BASIC METABOLIC PNL TOTAL CA: CPT | Performed by: STUDENT IN AN ORGANIZED HEALTH CARE EDUCATION/TRAINING PROGRAM

## 2021-09-12 PROCEDURE — 99233 SBSQ HOSP IP/OBS HIGH 50: CPT | Mod: GC | Performed by: INTERNAL MEDICINE

## 2021-09-12 PROCEDURE — 250N000013 HC RX MED GY IP 250 OP 250 PS 637: Performed by: PHYSICIAN ASSISTANT

## 2021-09-12 PROCEDURE — 999N000215 HC STATISTIC HFNC ADULT NON-CPAP

## 2021-09-12 PROCEDURE — 36592 COLLECT BLOOD FROM PICC: CPT | Performed by: STUDENT IN AN ORGANIZED HEALTH CARE EDUCATION/TRAINING PROGRAM

## 2021-09-12 RX ADMIN — AMLODIPINE BESYLATE 5 MG: 5 TABLET ORAL at 08:09

## 2021-09-12 RX ADMIN — CHOLESTYRAMINE 8 G: 4 POWDER, FOR SUSPENSION ORAL at 08:08

## 2021-09-12 RX ADMIN — INSULIN GLARGINE 5 UNITS: 100 INJECTION, SOLUTION SUBCUTANEOUS at 08:09

## 2021-09-12 RX ADMIN — CEFEPIME HYDROCHLORIDE 2 G: 2 INJECTION, POWDER, FOR SOLUTION INTRAVENOUS at 21:07

## 2021-09-12 RX ADMIN — CHOLESTYRAMINE 8 G: 4 POWDER, FOR SUSPENSION ORAL at 13:28

## 2021-09-12 RX ADMIN — METHYLPREDNISOLONE SODIUM SUCCINATE 125 MG: 125 INJECTION, POWDER, FOR SOLUTION INTRAMUSCULAR; INTRAVENOUS at 08:07

## 2021-09-12 RX ADMIN — PANTOPRAZOLE SODIUM 40 MG: 40 TABLET, DELAYED RELEASE ORAL at 08:08

## 2021-09-12 RX ADMIN — Medication 3 MG: at 21:06

## 2021-09-12 RX ADMIN — CEFEPIME HYDROCHLORIDE 2 G: 2 INJECTION, POWDER, FOR SOLUTION INTRAVENOUS at 05:23

## 2021-09-12 RX ADMIN — FLUTICASONE FUROATE 1 PUFF: 100 POWDER RESPIRATORY (INHALATION) at 08:08

## 2021-09-12 RX ADMIN — FLUCONAZOLE 400 MG: 200 TABLET ORAL at 21:06

## 2021-09-12 RX ADMIN — LEVOTHYROXINE SODIUM 25 MCG: 0.03 TABLET ORAL at 08:08

## 2021-09-12 RX ADMIN — MYCOPHENOLATE MOFETIL 500 MG: 500 TABLET, FILM COATED ORAL at 17:58

## 2021-09-12 RX ADMIN — HEPARIN SODIUM 5000 UNITS: 5000 INJECTION, SOLUTION INTRAVENOUS; SUBCUTANEOUS at 21:07

## 2021-09-12 RX ADMIN — CHOLESTYRAMINE 8 G: 4 POWDER, FOR SUSPENSION ORAL at 21:07

## 2021-09-12 RX ADMIN — INSULIN ASPART 7 UNITS: 100 INJECTION, SOLUTION INTRAVENOUS; SUBCUTANEOUS at 09:20

## 2021-09-12 RX ADMIN — ESCITALOPRAM OXALATE 5 MG: 5 TABLET, FILM COATED ORAL at 21:06

## 2021-09-12 RX ADMIN — AZITHROMYCIN MONOHYDRATE 250 MG: 250 TABLET ORAL at 08:09

## 2021-09-12 RX ADMIN — PANTOPRAZOLE SODIUM 40 MG: 40 TABLET, DELAYED RELEASE ORAL at 17:58

## 2021-09-12 RX ADMIN — MYCOPHENOLATE MOFETIL 500 MG: 500 TABLET, FILM COATED ORAL at 08:15

## 2021-09-12 RX ADMIN — HEPARIN SODIUM 5000 UNITS: 5000 INJECTION, SOLUTION INTRAVENOUS; SUBCUTANEOUS at 08:07

## 2021-09-12 RX ADMIN — CEFEPIME HYDROCHLORIDE 2 G: 2 INJECTION, POWDER, FOR SOLUTION INTRAVENOUS at 13:28

## 2021-09-12 RX ADMIN — INSULIN ASPART 5 UNITS: 100 INJECTION, SOLUTION INTRAVENOUS; SUBCUTANEOUS at 17:59

## 2021-09-12 ASSESSMENT — MIFFLIN-ST. JEOR: SCORE: 1427

## 2021-09-12 ASSESSMENT — ACTIVITIES OF DAILY LIVING (ADL)
ADLS_ACUITY_SCORE: 19

## 2021-09-12 NOTE — PROGRESS NOTES
[facility] Pulmonology Follow up    Edson Thornton  MRN: [id number]  : 1965    Date of Admission: 2021  Date of Service: 21    Assessment:  Acute hypoxic respiratory failure  NSIP ILD  Rheumatoid arthritis (positive anti-CCP, RF)    Mr. Thornton is a 55yo gentleman with history of NSIP/ILD, RA (s/p rituxan infusion, leflunomide), SALTY, HTN transferred from OSH for worsening acute on chronic hypoxemic respiratory failure and progressive dyspnea in the setting of progressive ILD without other therapy options, who has been evaluated for lung transplant. He has been approved for listing by the transplant evaluation committee pending tapering of his steroids prior to becoming active on transplant list. He is generally asymptomatic, maintained on HFNC. Also receiving cholestyramine TID for washout of leflunomide in case this is contributing to his respiratory status.  He remains on high dose steroids, and we would like to taper as able. We initiated mycophenolate on  and will monitor for response/ability to taper steroids faster.    Recommendations:  -- continue mycophenolate 500mg BID now, monitoring for toxicity (mostly GI). Will plan to uptitrate as able, likely to 1gm BID in 5-7 days  -- continue to wean steroids as outlined, will continue to assess steroid taper plan with initiation of cellcept   - bactrim for PCP ppx while on high dose steroids  -- trend CRP, prn procalcitonin  -- continues on cefepime, azithromycin, fluconazole  -- on cholestyramine for possible leflunomide pneumonitis/attempted washout    Subjective:  Continues to feel well. No new respiratory symptoms, no cough. Staying in bed due to SOB and hypoxia with ambulation. Daughter present at bedside.    Review of systems: complete 10 point review of systems completed and negative except as noted above in HPI.    Objective:  /80 (BP Location: Left arm)   Pulse 63   Temp 98.1  F (36.7  C) (Oral)   Resp 20   Ht  "1.626 m (5' 4\")   Wt 68.6 kg (151 lb 3.8 oz)   SpO2 96%   BMI 25.96 kg/m      Gen: in no acute distress, sitting upright in bed  HEENT: HFNC in place, no scleral injection/icterus  CV: RRR, no murmurs appreciated, extremities warm, no peripheral edema  Pulm: no increased work of breathing, fine crackles BL bases to mid lung, no wheezing  GI: soft, not distended, BS present  MSK: no gross deformities, no joint swelling  Integ: no rashes or lesions appreciated  Neuro: speech clear, alert and oriented  Psych: calm, appropriate    Data:  I have personally reviewed lab work from 9/12, BMP wnl. Most recent imaging CT chest 9/7. PFTs 7/5/21 in care everywhere, FEV1/FVC 88, FEV1 1.87 (63%pred), FVC 2.12 (55%pred), DLCO 20 (75% pred)-- interpreted as pseudorestrictive pattern with positive bronchodilator response (% change in FVC and FEV1 >12% and >200cc in both).    Kathy Esparza DO  Pulmonary fellow  Pager: [phone]    Patient seen and discussed with Dr. Vásquez.  "

## 2021-09-12 NOTE — PLAN OF CARE
Neuro: A&Ox4.   Cardiac: SR. VSS.       Respiratory: Sating ~90% on 60%HFNC at rest or 50% bipap while sleeping. Dyspnea with exertion but otherwise no new SOB. Fine crackles to bases.   GI/: Adequate urine output per urinal, no BM, no N/V.   Diet/appetite: Tolerating regular diet.   Activity:  Moves in bed independently, did not get out of bed overnight.   Pain: Denies pain.  Skin: No new deficits noted.  LDA's: R triple PICC, TKO/saline locked.      Plan: Continue with POC. Notify primary team with changes.

## 2021-09-12 NOTE — PLAN OF CARE
"Care for pt from [id number]:    /81 (BP Location: Left arm)   Pulse 60   Temp 97.8  F (36.6  C) (Oral)   Resp 20   Ht 1.626 m (5' 4\")   Wt 68.6 kg (151 lb 3.8 oz)   SpO2 96%   BMI 25.96 kg/m        Neuro: A&Ox4. Family at bedside this morning. Pt calls needed.    Cardiac: SR. VSS.       Respiratory: Sating 88%>on HFNC at 55-70% on 35LPM.  Bipap as needed and with sleep at 50% FIO2.  GI/: Adequate urine output into urinal. BM X 1 into bedpan.  Diet/appetite: Tolerating regular diet. Eating well.  Activity:  Assist of 2, up to chair and repo in bed  Pain: At acceptable level on current regimen.   Skin: No new deficits noted.  LDA's:PICC     Plan: Continue with POC. Notify primary team with changes.                  "

## 2021-09-12 NOTE — PROGRESS NOTES
[facility]    Progress Note    Date of Service (when I saw the patient): 09/12/2021    Assessment & Plan     Edson Thornton is a 56 year old male with history of NSIP associated with rheumatoid arthritis and traction bronchiectasis who was admitted on 9/5/2021 with acute on chronic hypoxic respiratory failure likely from ILD. Weaning steroids now in preparation for transplant.       Acute hypoxic respiratory failure 2/2 progressive ILD  -Appreciate pulm consult  -Continue BiPAP at night, HFNC during day  -Continue empiric abx with cefepime, azithro, fluconazole  -Continue steroid wean, methylpred 125 mg daily x 3d starting 9/12, then pred 60 mg daily. Needs to be on pred 20 mg daily or less prior to eligibility to be listed.  -Start MMF per pulm     Rheumatoid arthritis  Patient seen by rheumatology this admission. No acute flare of rheumatoid arthritis. SINCERE antibodies negative, flow cytometry negative for CD19 cells, CK normal, aldolase normal. Anti-Irene-1 pending. The patient was treated with leflunomide from 5/21 to 7/21. Rheumatology considers that leflunomide toxicity might contribute to the patient's ILD. Strated on cholestyramine TID to increase leflunomide clearance.      Steroid induced diabetes  Patient treated with insulin sliding scale and morning glargine. Glucose once at 204 mg/dL, rest of values below 200. Will continue to monitor and will increase basal coverage as needed.      Hypothyroidism   Continue 25 mg levothyroxin qday     SALTY  Continue BiPAP at night.      Hypertension  On 5mg amlodipine, BP well controlled today.     Anxiety  Depression  Continue 5mg escitalopram        DVT Prophylaxis: UFH 5000U, q12h  Code Status: Full Code    Disposition: Pending prednisone taper, anticipate prolonged hospitalization due to potential disease progression and lung transplant.        Interval History   No acute events overnight. Feels ok this morning. Breathing  is about the same. Denies abdominal pain. No bowel movement yesterday. Appetite fair.    -Data reviewed today: I reviewed all new labs and imaging results over the last 24 hours.  Physical Exam   Temp: 98.1  F (36.7  C) Temp src: Oral BP: 138/80 Pulse: 63   Resp: 20 SpO2: 96 % O2 Device: BiPAP/CPAP Oxygen Delivery: 35 LPM  Vitals:    09/10/21 0400 09/11/21 0347 09/12/21 0500   Weight: 68.1 kg (150 lb 1.6 oz) 68.5 kg (151 lb 0.2 oz) 68.6 kg (151 lb 3.8 oz)     Vital Signs with Ranges  Temp:  [97.2  F (36.2  C)-98.5  F (36.9  C)] 98.1  F (36.7  C)  Pulse:  [56-70] 63  Resp:  [18-24] 20  BP: (124-149)/(71-85) 138/80  FiO2 (%):  [50 %-60 %] 50 %  SpO2:  [90 %-96 %] 96 %  I/O last 3 completed shifts:  In: 1410 [P.O.:1180; I.V.:230]  Out: 3350 [Urine:3350]    Constitutional:   Increased work of breathing, AAOx3, NAD  Respiratory: On high flow nc oxygen, coarse breathing sounds with diffuse crackles on both lung fields  Cardiovascular: RRR, no r/m/g, S1, S2  GI: nontender  Skin/Integumen: warm, no jaundice    Medications     - MEDICATION INSTRUCTIONS -       - MEDICATION INSTRUCTIONS -         amLODIPine  5 mg Oral Daily     azithromycin  250 mg Oral Daily     ceFEPIme (MAXIPIME) IV  2 g Intravenous Q8H     cholestyramine  2 packet Oral TID     escitalopram  5 mg Oral QPM     fluconazole  400 mg Oral Daily     fluticasone  1 puff Inhalation Daily     heparin ANTICOAGULANT  5,000 Units Subcutaneous Q12H     insulin aspart   Subcutaneous TID AC     insulin aspart  1-7 Units Subcutaneous TID AC     insulin aspart  1-5 Units Subcutaneous At Bedtime     insulin glargine  5 Units Subcutaneous QAM AC     levothyroxine  25 mcg Oral QAM AC     melatonin  3 mg Oral At Bedtime     methylPREDNISolone  125 mg Intravenous Daily     mycophenolate  500 mg Oral BID IS     pantoprazole  40 mg Oral BID AC     [START ON 9/15/2021] predniSONE  60 mg Oral Daily     sodium chloride (PF)  3 mL Intracatheter Q8H     sulfamethoxazole-trimethoprim   1 tablet Oral Once per day on Mon Wed Fri       Data   Recent Labs   Lab 09/12/21  0813 09/12/21  0615 09/12/21  0359 09/11/21  0448 09/10/21  0616 09/08/21  0743 09/08/21  0626 09/07/21  1324   WBC  --  23.9*  --  22.8* 25.1*   < > 23.0* 27.9*   HGB  --  9.4*  --  9.3* 9.3*   < > 9.6* 10.9*   MCV  --  90  --  90 93   < > 86 88   PLT  --  129*  --  123* 132*   < > 126* 153   INR  --   --   --   --   --   --  1.19* 1.16*   NA  --  136  --  134 135   < > 138 138   POTASSIUM  --  3.9  --  4.1 4.3   < > 4.3 3.9   CHLORIDE  --  105  --  103 103   < > 107 107   CO2  --  24  --  24 24   < > 26 25   BUN  --  17  --  20 21   < > 22 23   CR  --  0.57*  --  0.56* 0.61*   < > 0.76 0.69   ANIONGAP  --  7  --  7 8   < > 5 6   ERIK  --  8.5  --  8.2* 8.4*   < > 8.6 8.8   * 224* 228* 274* 204*   < > 160* 151*   ALBUMIN  --   --   --   --   --   --   --  2.4*   PROTTOTAL  --   --   --   --   --   --   --  5.6*   BILITOTAL  --   --   --   --   --   --   --  0.2   ALKPHOS  --   --   --   --   --   --   --  67   ALT  --   --   --   --   --   --   --  39   AST  --   --   --   --   --   --   --  30    < > = values in this interval not displayed.       No results found for this or any previous visit (from the past 24 hour(s)).    Gume Baldwin MD

## 2021-09-13 ENCOUNTER — APPOINTMENT (OUTPATIENT)
Dept: PHYSICAL THERAPY | Facility: CLINIC | Age: 56
End: 2021-09-13
Attending: STUDENT IN AN ORGANIZED HEALTH CARE EDUCATION/TRAINING PROGRAM
Payer: COMMERCIAL

## 2021-09-13 LAB
A*: NORMAL
A*LOCUS SEROLOGIC EQUIVALENT: 3
A*LOCUS: NORMAL
A*SEROLOGIC EQUIVALENT: 25
ABTEST METHOD: NORMAL
ANION GAP SERPL CALCULATED.3IONS-SCNC: 1 MMOL/L (ref 3–14)
B*: NORMAL
B*LOCUS SEROLOGIC EQUIVALENT: 62
B*LOCUS: NORMAL
B*SEROLOGIC EQUIVALENT: 44
BUN SERPL-MCNC: 13 MG/DL (ref 7–30)
BW-1: NORMAL
BW-2: NORMAL
C*: NORMAL
C*LOCUS SEROLOGIC EQUIVALENT: 9
C*LOCUS: NORMAL
C*SEROLOGIC EQUIVALENT: 5
CALCIUM SERPL-MCNC: 8.1 MG/DL (ref 8.5–10.1)
CHLORIDE BLD-SCNC: 106 MMOL/L (ref 94–109)
CO2 SERPL-SCNC: 29 MMOL/L (ref 20–32)
CREAT SERPL-MCNC: 0.54 MG/DL (ref 0.66–1.25)
CRP SERPL-MCNC: <2.9 MG/L (ref 0–8)
DPA1*: NORMAL
DPB1*: NORMAL
DPB1*LOCUS NMDP: NORMAL
DPB1*LOCUS: NORMAL
DPB1*NMDP: NORMAL
DQA1*: NORMAL
DQA1*LOCUS: NORMAL
DQB1*LOCUS SEROLOGIC EQUIVALENT: 8
DQB1*LOCUS: NORMAL
DRB1*: NORMAL
DRB1*LOCUS SEROLOGIC EQUIVALENT: 4
DRB1*LOCUS: NORMAL
DRB1*SEROLOGIC EQUIVALENT: 4
DRB4*LOCUS SEROLOGIC EQUIVALENT: 53
DRB4*LOCUS: NORMAL
DRSSO TEST METHOD: NORMAL
ERYTHROCYTE [DISTWIDTH] IN BLOOD BY AUTOMATED COUNT: 15.7 % (ref 10–15)
GFR SERPL CREATININE-BSD FRML MDRD: >90 ML/MIN/1.73M2
GLUCOSE BLD-MCNC: 148 MG/DL (ref 70–99)
GLUCOSE BLDC GLUCOMTR-MCNC: 115 MG/DL (ref 70–99)
GLUCOSE BLDC GLUCOMTR-MCNC: 201 MG/DL (ref 70–99)
GLUCOSE BLDC GLUCOMTR-MCNC: 213 MG/DL (ref 70–99)
GLUCOSE BLDC GLUCOMTR-MCNC: 83 MG/DL (ref 70–99)
HCT VFR BLD AUTO: 29.9 % (ref 40–53)
HGB BLD-MCNC: 9.6 G/DL (ref 13.3–17.7)
LACTATE SERPL-SCNC: 1.5 MMOL/L (ref 0.7–2)
LACTATE SERPL-SCNC: 3.4 MMOL/L (ref 0.7–2)
MCH RBC QN AUTO: 28.5 PG (ref 26.5–33)
MCHC RBC AUTO-ENTMCNC: 32.1 G/DL (ref 31.5–36.5)
MCV RBC AUTO: 89 FL (ref 78–100)
PLATELET # BLD AUTO: 138 10E3/UL (ref 150–450)
POTASSIUM BLD-SCNC: 3.3 MMOL/L (ref 3.4–5.3)
RBC # BLD AUTO: 3.37 10E6/UL (ref 4.4–5.9)
SODIUM SERPL-SCNC: 136 MMOL/L (ref 133–144)
TPMT BLD-CCNC: 16.3 U/ML
WBC # BLD AUTO: 27.1 10E3/UL (ref 4–11)

## 2021-09-13 PROCEDURE — 250N000011 HC RX IP 250 OP 636: Performed by: PHYSICIAN ASSISTANT

## 2021-09-13 PROCEDURE — 94660 CPAP INITIATION&MGMT: CPT

## 2021-09-13 PROCEDURE — 97110 THERAPEUTIC EXERCISES: CPT | Mod: GP | Performed by: PHYSICAL THERAPIST

## 2021-09-13 PROCEDURE — 250N000013 HC RX MED GY IP 250 OP 250 PS 637: Performed by: INTERNAL MEDICINE

## 2021-09-13 PROCEDURE — 999N000157 HC STATISTIC RCP TIME EA 10 MIN

## 2021-09-13 PROCEDURE — 99233 SBSQ HOSP IP/OBS HIGH 50: CPT | Mod: GC | Performed by: STUDENT IN AN ORGANIZED HEALTH CARE EDUCATION/TRAINING PROGRAM

## 2021-09-13 PROCEDURE — 258N000003 HC RX IP 258 OP 636: Performed by: NURSE PRACTITIONER

## 2021-09-13 PROCEDURE — 250N000013 HC RX MED GY IP 250 OP 250 PS 637: Performed by: STUDENT IN AN ORGANIZED HEALTH CARE EDUCATION/TRAINING PROGRAM

## 2021-09-13 PROCEDURE — 999N000043 HC STATISTIC CTO2 CONT OXYGEN TECH TIME EA 90 MIN

## 2021-09-13 PROCEDURE — 36592 COLLECT BLOOD FROM PICC: CPT | Performed by: STUDENT IN AN ORGANIZED HEALTH CARE EDUCATION/TRAINING PROGRAM

## 2021-09-13 PROCEDURE — 999N000215 HC STATISTIC HFNC ADULT NON-CPAP

## 2021-09-13 PROCEDURE — 86140 C-REACTIVE PROTEIN: CPT | Performed by: STUDENT IN AN ORGANIZED HEALTH CARE EDUCATION/TRAINING PROGRAM

## 2021-09-13 PROCEDURE — 80048 BASIC METABOLIC PNL TOTAL CA: CPT | Performed by: STUDENT IN AN ORGANIZED HEALTH CARE EDUCATION/TRAINING PROGRAM

## 2021-09-13 PROCEDURE — 250N000012 HC RX MED GY IP 250 OP 636 PS 637: Performed by: INTERNAL MEDICINE

## 2021-09-13 PROCEDURE — 83605 ASSAY OF LACTIC ACID: CPT | Performed by: NURSE PRACTITIONER

## 2021-09-13 PROCEDURE — 85027 COMPLETE CBC AUTOMATED: CPT | Performed by: STUDENT IN AN ORGANIZED HEALTH CARE EDUCATION/TRAINING PROGRAM

## 2021-09-13 PROCEDURE — 99232 SBSQ HOSP IP/OBS MODERATE 35: CPT | Mod: GC | Performed by: INTERNAL MEDICINE

## 2021-09-13 PROCEDURE — 120N000003 HC R&B IMCU UMMC

## 2021-09-13 PROCEDURE — 83605 ASSAY OF LACTIC ACID: CPT | Performed by: STUDENT IN AN ORGANIZED HEALTH CARE EDUCATION/TRAINING PROGRAM

## 2021-09-13 PROCEDURE — 250N000011 HC RX IP 250 OP 636: Performed by: STUDENT IN AN ORGANIZED HEALTH CARE EDUCATION/TRAINING PROGRAM

## 2021-09-13 PROCEDURE — 250N000013 HC RX MED GY IP 250 OP 250 PS 637: Performed by: PHYSICIAN ASSISTANT

## 2021-09-13 RX ADMIN — INSULIN GLARGINE 5 UNITS: 100 INJECTION, SOLUTION SUBCUTANEOUS at 07:50

## 2021-09-13 RX ADMIN — CHOLESTYRAMINE 8 G: 4 POWDER, FOR SUSPENSION ORAL at 20:04

## 2021-09-13 RX ADMIN — CEFEPIME HYDROCHLORIDE 2 G: 2 INJECTION, POWDER, FOR SOLUTION INTRAVENOUS at 20:03

## 2021-09-13 RX ADMIN — PANTOPRAZOLE SODIUM 40 MG: 40 TABLET, DELAYED RELEASE ORAL at 15:55

## 2021-09-13 RX ADMIN — INSULIN ASPART 5 UNITS: 100 INJECTION, SOLUTION INTRAVENOUS; SUBCUTANEOUS at 14:29

## 2021-09-13 RX ADMIN — FLUCONAZOLE 400 MG: 200 TABLET ORAL at 20:03

## 2021-09-13 RX ADMIN — HEPARIN SODIUM 5000 UNITS: 5000 INJECTION, SOLUTION INTRAVENOUS; SUBCUTANEOUS at 20:03

## 2021-09-13 RX ADMIN — INSULIN ASPART 5 UNITS: 100 INJECTION, SOLUTION INTRAVENOUS; SUBCUTANEOUS at 17:37

## 2021-09-13 RX ADMIN — SULFAMETHOXAZOLE AND TRIMETHOPRIM 1 TABLET: 800; 160 TABLET ORAL at 10:41

## 2021-09-13 RX ADMIN — LEVOTHYROXINE SODIUM 25 MCG: 0.03 TABLET ORAL at 07:49

## 2021-09-13 RX ADMIN — AZITHROMYCIN MONOHYDRATE 250 MG: 250 TABLET ORAL at 07:50

## 2021-09-13 RX ADMIN — CEFEPIME HYDROCHLORIDE 2 G: 2 INJECTION, POWDER, FOR SOLUTION INTRAVENOUS at 13:00

## 2021-09-13 RX ADMIN — METHYLPREDNISOLONE SODIUM SUCCINATE 125 MG: 125 INJECTION, POWDER, FOR SOLUTION INTRAMUSCULAR; INTRAVENOUS at 07:50

## 2021-09-13 RX ADMIN — CHOLESTYRAMINE 8 G: 4 POWDER, FOR SUSPENSION ORAL at 14:29

## 2021-09-13 RX ADMIN — Medication 3 MG: at 20:03

## 2021-09-13 RX ADMIN — CHOLESTYRAMINE 8 G: 4 POWDER, FOR SUSPENSION ORAL at 07:49

## 2021-09-13 RX ADMIN — FLUTICASONE FUROATE 1 PUFF: 100 POWDER RESPIRATORY (INHALATION) at 07:50

## 2021-09-13 RX ADMIN — CEFEPIME HYDROCHLORIDE 2 G: 2 INJECTION, POWDER, FOR SOLUTION INTRAVENOUS at 05:37

## 2021-09-13 RX ADMIN — ESCITALOPRAM OXALATE 5 MG: 5 TABLET, FILM COATED ORAL at 20:03

## 2021-09-13 RX ADMIN — PANTOPRAZOLE SODIUM 40 MG: 40 TABLET, DELAYED RELEASE ORAL at 07:50

## 2021-09-13 RX ADMIN — MYCOPHENOLATE MOFETIL 500 MG: 500 TABLET, FILM COATED ORAL at 17:40

## 2021-09-13 RX ADMIN — HEPARIN SODIUM 5000 UNITS: 5000 INJECTION, SOLUTION INTRAVENOUS; SUBCUTANEOUS at 07:50

## 2021-09-13 RX ADMIN — MYCOPHENOLATE MOFETIL 500 MG: 500 TABLET, FILM COATED ORAL at 07:50

## 2021-09-13 RX ADMIN — INSULIN ASPART 7 UNITS: 100 INJECTION, SOLUTION INTRAVENOUS; SUBCUTANEOUS at 10:41

## 2021-09-13 RX ADMIN — AMLODIPINE BESYLATE 5 MG: 5 TABLET ORAL at 07:49

## 2021-09-13 RX ADMIN — SODIUM CHLORIDE, POTASSIUM CHLORIDE, SODIUM LACTATE AND CALCIUM CHLORIDE 500 ML: 600; 310; 30; 20 INJECTION, SOLUTION INTRAVENOUS at 01:17

## 2021-09-13 ASSESSMENT — ACTIVITIES OF DAILY LIVING (ADL)
ADLS_ACUITY_SCORE: 20
ADLS_ACUITY_SCORE: 20
ADLS_ACUITY_SCORE: 19
ADLS_ACUITY_SCORE: 20

## 2021-09-13 ASSESSMENT — MIFFLIN-ST. JEOR
SCORE: 1381
SCORE: 1447

## 2021-09-13 NOTE — PROGRESS NOTES
[facility]    Progress Note    Date of Service (when I saw the patient): 09/13/2021    Assessment & Plan   Edson Thornton is a 56 year old male with history of NSIP associated with rheumatoid arthritis and traction bronchiectasis who was admitted on 9/5/2021 with acute on chronic hypoxic respiratory failure likely from ILD. Weaning steroids now in preparation for transplant.        Acute hypoxic respiratory failure 2/2 progressive ILD  -Appreciate pulm consult  -Continue BiPAP at night, HFNC during day  -Continue empiric abx with cefepime, azithro, fluconazole  -Continue steroid wean, methylpred 125 mg daily x 3d starting 9/12, then pred 60 mg daily. Needs to be on pred 20 mg daily or less prior to eligibility to be listed.  -Started MMF per pulm     Rheumatoid arthritis  Patient seen by rheumatology this admission. No acute flare of rheumatoid arthritis. SINCERE antibodies negative, flow cytometry negative for CD19 cells, CK normal, aldolase normal. Anti-Irene-1 negative. The patient was treated with leflunomide from 5/21 to 7/21. Rheumatology considers that leflunomide toxicity might contribute to the patient's ILD. Strated on cholestyramine TID to increase leflunomide clearance.      Steroid induced diabetes  Patient treated with insulin sliding scale and morning glargine. Glucose persistently over 200 in the weekend, morning glucose normal. Will observe and increase baseline glargine if same pattern today.     Hypothyroidism   Continue 25 mg levothyroxin qday     SALTY  Continue BiPAP at night.      Hypertension  On 5mg amlodipine, BP well controlled today.     Anxiety  Depression  Continue 5mg escitalopram        DVT Prophylaxis: UFH 5000U, q12h  Code Status: Full Code     Disposition: Pending prednisone taper, anticipate prolonged hospitalization due to potential disease progression and lung transplant.  Sherri Layne    Interval History   SIRS/Sepsis suspected  overnight. Rapid response team was called due to elevated lactate in night labs. The patient was vitally stable with tachypnea, already covered with antibiotics, 500 ml LR was infused over two hours. Lactate back to normal at AM.    -Data reviewed today: I reviewed all new labs and imaging results over the last 24 hours.    Physical Exam   Temp: 98.2  F (36.8  C) Temp src: Oral BP: 129/76 Pulse: 66   Resp: 23 SpO2: (!) 88 % O2 Device: High Flow Nasal Cannula (HFNC) Oxygen Delivery: 35 LPM  Vitals:    09/11/21 0347 09/12/21 0500 09/13/21 0020   Weight: 68.5 kg (151 lb 0.2 oz) 68.6 kg (151 lb 3.8 oz) 64 kg (141 lb 1.5 oz)     Vital Signs with Ranges  Temp:  [96.9  F (36.1  C)-98.2  F (36.8  C)] 98.2  F (36.8  C)  Pulse:  [60-75] 66  Resp:  [20-24] 23  BP: (125-160)/(73-84) 129/76  FiO2 (%):  [55 %-60 %] 55 %  SpO2:  [88 %-97 %] 88 %  I/O last 3 completed shifts:  In: 1990 [P.O.:1390; I.V.:100; IV Piggyback:500]  Out: 2125 [Urine:2125]    Constitutional:   Increased work of breathing, AAOx3, NAD  Respiratory: On high flow nc oxygen, coarse breathing sounds with diffuse crackles on both lung fields  Cardiovascular: RRR, no r/m/g, S1, S2  GI: nontender  Skin/Integumen: warm, no jaundice, mild edema at the level of the ankles.    Medications     - MEDICATION INSTRUCTIONS -       - MEDICATION INSTRUCTIONS -         amLODIPine  5 mg Oral Daily     azithromycin  250 mg Oral Daily     ceFEPIme (MAXIPIME) IV  2 g Intravenous Q8H     cholestyramine  2 packet Oral TID     escitalopram  5 mg Oral QPM     fluconazole  400 mg Oral Daily     fluticasone  1 puff Inhalation Daily     heparin ANTICOAGULANT  5,000 Units Subcutaneous Q12H     insulin aspart   Subcutaneous TID AC     insulin aspart  1-7 Units Subcutaneous TID AC     insulin aspart  1-5 Units Subcutaneous At Bedtime     insulin glargine  5 Units Subcutaneous QAM AC     levothyroxine  25 mcg Oral QAM AC     melatonin  3 mg Oral At Bedtime     methylPREDNISolone  125 mg  Intravenous Daily     mycophenolate  500 mg Oral BID IS     pantoprazole  40 mg Oral BID AC     [START ON 9/15/2021] predniSONE  60 mg Oral Daily     sodium chloride (PF)  3 mL Intracatheter Q8H     sulfamethoxazole-trimethoprim  1 tablet Oral Once per day on Mon Wed Fri       Data   Recent Labs   Lab 09/13/21 0723 09/13/21 0449 09/13/21  0121 09/12/21  0615 09/11/21  0448 09/08/21  0743 09/08/21  0626 09/07/21  1324   WBC  --  27.1*  --  23.9* 22.8*   < > 23.0* 27.9*   HGB  --  9.6*  --  9.4* 9.3*   < > 9.6* 10.9*   MCV  --  89  --  90 90   < > 86 88   PLT  --  138*  --  129* 123*   < > 126* 153   INR  --   --   --   --   --   --  1.19* 1.16*   NA  --  136  --  136 134   < > 138 138   POTASSIUM  --  3.3*  --  3.9 4.1   < > 4.3 3.9   CHLORIDE  --  106  --  105 103   < > 107 107   CO2  --  29  --  24 24   < > 26 25   BUN  --  13  --  17 20   < > 22 23   CR  --  0.54*  --  0.57* 0.56*   < > 0.76 0.69   ANIONGAP  --  1*  --  7 7   < > 5 6   ERIK  --  8.1*  --  8.5 8.2*   < > 8.6 8.8   GLC 83 148* 213* 224* 274*   < > 160* 151*   ALBUMIN  --   --   --   --   --   --   --  2.4*   PROTTOTAL  --   --   --   --   --   --   --  5.6*   BILITOTAL  --   --   --   --   --   --   --  0.2   ALKPHOS  --   --   --   --   --   --   --  67   ALT  --   --   --   --   --   --   --  39   AST  --   --   --   --   --   --   --  30    < > = values in this interval not displayed.       No results found for this or any previous visit (from the past 24 hour(s)).    Sherri Layne MD

## 2021-09-13 NOTE — CONSULTS
Dental Hygiene Consult Service        Edson Thornton MRN# [id number]   YOB: 1965 Age: 56 year old     Date of Admission: 9/5/2021  PCP is Lia Spicer  Date of Service: 9/13/2021           Reason for Consult:   Referring MD & Reason for Visit: I was asked by Keren Hood MD, to see Edson Thornton for oral hygiene assessment, transplant eval.            History of Present Illness:   This patient is a 56 year old male with a history of NSIP associated with rheumatoid arthritis and traction bronchiectasis who was admitted on 9/5/2021 with acute on chronic hypoxic respiratory failure likely from ILD. Weaning steroids now in preparation for transplant.   .  Dental and oropharyngeal history is pertinent for: had emergent care about 6 months ago for a dental need.  The patient reports no pain in mouth, knows he is overdue for prophylaxis visit.      The patient's aspiration history is unknown.     The patient does not have a history of dentures or dental appliances.       Current oral products and cares performed by the patient and/or nursing include:  Patient is independent in oral cares and brushes at least 2x/day with soft toothbrush, uses fluoridated mouth rinse at least 1x/day. Pt is very knowledgeable about the oral/systemic link as it relates to his lung health.              Past Medical History:     Past Medical History:   Diagnosis Date     Anxiety      Depression      HLD (hyperlipidemia)      HTN (hypertension)      Hypothyroidism      ILD (interstitial lung disease) (H)      SALTY on CPAP      Oxygen dependent     BL 4L since ~6/2021     Rheumatoid arthritis (H)     signs ~5/2020, dx 5/2021     Steroid-induced hyperglycemia      Traction bronchiectasis (H)                Social History:     Social History     Tobacco Use     Smoking status: Never Smoker     Smokeless tobacco: Never Used   Substance Use Topics     Alcohol use: Never     Drug use: Never              Medications:   No  current facility-administered medications on file prior to encounter.  acetaminophen (TYLENOL) 325 MG tablet, Take 325 mg by mouth every 6 hours as needed for mild pain  amLODIPine (NORVASC) 5 MG tablet, Take 5 mg by mouth daily  escitalopram (LEXAPRO) 5 MG tablet, Take 5 mg by mouth daily  fluticasone-vilanterol (BREO ELLIPTA) 200-25 MCG/INH inhaler, Inhale 1 puff into the lungs daily  insulin aspart (NOVOLOG FLEXPEN) 100 UNIT/ML pen, Inject 3-11 Units Subcutaneous With meals and at bedtime.  levothyroxine (SYNTHROID/LEVOTHROID) 25 MCG tablet, Take 25 mcg by mouth daily  omeprazole (PRILOSEC) 40 MG DR capsule, Take 40 mg by mouth 2 times daily (before meals)  potassium chloride ER (KLOR-CON M) 20 MEQ CR tablet, Take 20 mEq by mouth daily  sulfamethoxazole-trimethoprim (BACTRIM DS) 800-160 MG tablet, Take 1 tablet by mouth Every Mon, Wed, Fri Morning               Physical Exam:   Head and neck exam:  NSF, no asymmetry or swelling, no pain on palpation. No pathology of TMJ noted, no discomfort on open or close.     Soft Tissue exam:  Tissues are pink and moist. Throat appeared clear. No pain when palpating vestibular areas or under tongue.   Hard Tissue exam:  Missing lower R molars. Patient's dentition is highly restored and has severe dental wear. Very light plaque biofilm.            Assessment and Plan:   Assessment:  This patient is a 56 year old male with a history ofNSIP associated with rheumatoid arthritis and traction bronchiectasis who was admitted on 9/5/2021 with acute on chronic hypoxic respiratory failure likely from ILD. Weaning steroids now in preparation for transplant.   , oral exam concerning for highly restored dentition, unknown dental health status.     Assessment and Plan:  #highly restored and worn dentition, absence of recent dental exam  -  Recommended workup includes: Dental CT to rule out any infection prior to transplant  -  General Practice Dentistry referral recommended? Yes prior to  transplant.   -  Oral and Maxillofacial Surgery referral recommended? No    Oral Care Plan:    - Continue good OH regimen, patient is independent in his care:  -3x/day brushing with manual soft toothbrush  -2x/day rinsing with preferably with alcohol free antiseptic mouth rinse, pt has a fluoride rinse he uses. Ideal would be to use both, alternating morning/evening.   - Pt should be seen either in the hospital for dental CT and GPR consult OR outpatient comprehensive exam with primary care DDS prior to transplant.   Indira Seth  [phone]

## 2021-09-13 NOTE — PLAN OF CARE
Neuro: A&Ox4.   Cardiac: SR. VSS.   Respiratory: Sating >92% on HFNC 55%, 35L while awake and BiPAP 60% while sleeping. LS clear, diminished. Occasional productive cough. RR 22-24  GI/: Adequate urine output. BM X1  Diet/appetite: Tolerating regular diet. Eating well.  Activity:  Assist of 1, up to commode once while wearing BiPAP with no desat  Pain: Denies pain   Skin: No new deficits noted.  LDA's: RUE PICC - TKO    Pt triggered Sepsis BPA due to respiratory rate and elevated WBC, other vitals stable. Lactic 3.4, code sepsis called. 500cc LR bolus given, repeat lactic ordered for AM.    Plan: Continue with POC. Notify primary team with changes.

## 2021-09-13 NOTE — PROGRESS NOTES
"Weekly status update      Transplant Phase: Transplant Evaluation complete. Current barriers to listing: insurance approval and still requiring high dose steroids (>20mg Prednisone/day) with taper plan in place.        Brief Assessment/Chart review:  Neuro: Alert/oriented x4, pleasant.   Respiratory: HFNC FiO2 55-60% at rest. BIPAP FiO2 60% while sleeping.  Activity: SBA - mobility limited by current O2 requirements.   PT/OT consult? Yes, following. Pt reports exercising with red and yellow bands at bedside throughout the day as able.        Fluid status:  Kidney function: Cr 0.54 (9/13/21)  Weight admission: 73.3kg current: 64kg today -> recheck using standing scale pending (was 68.6kg on 9/12)        CV:    RHC/Angio: completed 9/8/21  Read:    \"Right sided filling pressures are mildly elevated.    Moderately elevated pulmonary artery hypertension.    Left sided filling pressures are normal.    Normal cardiac output level.    Normal coronary arteries with mild tortuosity\"    ECHO with bubble completed 9/7/21  Read:   \"Interpretation Summary  Global and regional left ventricular function is normal with an EF of 60-65%.  Global right ventricular function is normal.  No significant valvular abnormalities present.  The patent foramen ovale was demonstrated by color Doppler .  Estimated pulmonary artery systolic pressure is 40 mmHg. Pulmonary  hypertension is present.  The inferior vena cava was normal in size with preserved respiratory  Variability. No pericardial effusion is present.\"    Anticoagulation:  - Heparin injections 5000 units q12h (DVT prophylaxis)    *Note: DVT prophylaxis for active candidates on lung transplant waitlist: Please order Heparin instead of Lovenox due to availability of reversal agent in the event the patient is called to the OR for transplant.       Blood transfusions:  Current Hgb 9.6 (9/13/21)  Last PRA on 9/7/21 - UNOS cPRA of 0 (standard lung mfi)  Recommend repeating PRA 3 months on " "12/7/21     *If considering blood transfusion, please contact transplant team/coordinator due to risk of sensitization.         Infection:  WBC trending up, now 27.1 today  Pt reports no new or increased respiratory symptoms, temps <99 per chart review. No increase in O2 requirements per documentation.     Antibiotics:   - Cefepime (9/5)  - Azithromycin (9/7)  - Fluconazole(9/5)  - Doxycycline (9/5-9/7)  - Bactrim (start 9/5)    ----------------------  Steroid taper plan:  - Solumedrol 125 mg through 9/14  9/15: Prednisone 60 mg x3 days  9/18: Prednisone 50mg x3 days  9/20: Prednisone 40mg x3 days  9/22: Prednisone 30mg x3 days  9/24: Prednisone 20mg x3 days    *Will need to be at Prednisone 20mg/day or lower in order to be actively listed for lung transplant.      Mood/Behavior/Coping:  Remains in good spirits. Bret mentioned he had \"a good weekend.\" Feels his breathing has improved slightly; better at rest and with exertion. Working with therapy, trying to have more food throughout the day to maintain his weight.       Transplant education:  Completed 9/8-9/10/21. Hepatitis C paperwork completed today, scanned copies placed in paper chart.      Mallory Beasley, RN, BSN, PHN  Inpatient Lung Transplant Coordinator  Solid Organ Transplant  [facility]  Pager: [phone]        "

## 2021-09-13 NOTE — PLAN OF CARE
"Care for pt from [id number]:    /75 (BP Location: Left arm)   Pulse 78   Temp 98.3  F (36.8  C) (Oral)   Resp 20   Ht 1.626 m (5' 4\")   Wt 64 kg (141 lb 1.5 oz)   SpO2 97%   BMI 24.22 kg/m        Neuro: A&Ox4. Calls as needed.  Cardiac: SR. VSS.   Respiratory: Sating 88%>on 50-60 % FIO2 @ 35 LPM.  With actvity 75 % FIO2 with 35 LPM.  At night on BiPap at 50-60 % FIO2.  GI/: Adequate urine output into urinal. BM X1 into commode.  Diet/appetite: Tolerating regular diet. Eating well. ACHS BG.  Activity:  Assist of 1 up to chair and in halls.  Pain: At acceptable level on current regimen.   Skin: No new deficits noted.  LDA's:PICC,     Plan: Continue with POC. Notify primary team with changes.    "

## 2021-09-13 NOTE — PROGRESS NOTES
[facility]    Pulmonary, Allergy, Critical Care and Sleep Medicine    Follow-up Note  09/13/2021    Assessment and Recommendations:    Edson Thornton is a 57 yo M with NSIP/ILD, RA (s/p rituxan infusion, leflunomide), SALTY, HTN transferred from H for worsening acute on chronic hypoxemic respiratory failure and progressive dyspnea in the setting of progressive ILD without other therapy options, who has been evaluated for lung transplant. He has been approved for listing by the transplant evaluation committee pending tapering of his steroids to goal of 20 mg/day of less prior to becoming active on transplant list. On cholestyramine for possible leflunomide pneumonitis/attempted washout.    # Acute hypoxic respiratory failure  # NSIP/ILD, associated with RA  Will slowly wean steroids every 3 days until ~ 2 weeks post mycophenolate initiation at which point may be able to accelerate to every 2 days. Need to balance desire to get down to transplant maximum of 20 mg/day vs wanting to keep him stable/controlled. Started Solumedrol 125 daily 9/12 -> continue through 9/14, then start prednisone 60 mg on 9/15 and decrease by 10 mg every 3 days with goal of getting to 20 mg/day for transplant listing. Continue mycophenolate 500 mg BID for now, monitoring for side effects, particularly GI.    Recs  - Solumedrol 125 mg through 9/14, then starting 9/15 prednisone 60 mg x 3 days, 50 mg x 3 days, etc   - Continue mycophenolate 500 mg BID  - Trend CRP  - Agree with antibiotics    Seen and discussed with Dr Fidencio Muñoz MD  Pulmonary and Critical Care Fellow   Pager [phone]      Attending note:  Patient seen, examined and discussed with Dr. Muñoz. All data reviewed. Agree with the assessment and plan as outlined in the above note.  Started on Cellcept, continue current steroid taper- after being on CellCept for 2 weeks can quicken steroid taper. Goal for steroids at 20 mg prior to transplant.   Concern that more rapid steroid tapering at this point will cause worsening of oxygenation.    Yuki Nicolas MD  [phone]     Subjective, Interval history:   Feels 10% improved from Saturday. Plans to sit up in a chair today. Denies GI or other side effects from mycophenolate.    Objective:   Medications:    amLODIPine  5 mg Oral Daily     azithromycin  250 mg Oral Daily     ceFEPIme (MAXIPIME) IV  2 g Intravenous Q8H     cholestyramine  2 packet Oral TID     escitalopram  5 mg Oral QPM     fluconazole  400 mg Oral Daily     fluticasone  1 puff Inhalation Daily     heparin ANTICOAGULANT  5,000 Units Subcutaneous Q12H     insulin aspart   Subcutaneous TID AC     insulin aspart  1-7 Units Subcutaneous TID AC     insulin aspart  1-5 Units Subcutaneous At Bedtime     insulin glargine  5 Units Subcutaneous QAM AC     levothyroxine  25 mcg Oral QAM AC     melatonin  3 mg Oral At Bedtime     methylPREDNISolone  125 mg Intravenous Daily     mycophenolate  500 mg Oral BID IS     pantoprazole  40 mg Oral BID AC     [START ON 9/15/2021] predniSONE  60 mg Oral Daily     sodium chloride (PF)  3 mL Intracatheter Q8H     sulfamethoxazole-trimethoprim  1 tablet Oral Once per day on Mon Wed Fri     acetaminophen, benzocaine 20%, bisacodyl, artificial tears ophthalmic solution, glucose **OR** dextrose **OR** glucagon, fentaNYL, HOLD MEDICATION, ipratropium - albuterol 0.5 mg/2.5 mg/3 mL **OR** ipratropium - albuterol 0.5 mg/2.5 mg/3 mL, lidocaine 4%, lidocaine (buffered or not buffered), melatonin, midazolam, - MEDICATION INSTRUCTIONS -, ondansetron **OR** ondansetron, oxyCODONE **OR** oxyCODONE, - MEDICATION INSTRUCTIONS -, polyethylene glycol, sodium chloride (PF)    Physical Exam:  Temp:  [96.9  F (36.1  C)-98.2  F (36.8  C)] 98.1  F (36.7  C)  Pulse:  [60-75] 71  Resp:  [17-24] 17  BP: (125-160)/(73-84) 137/80  FiO2 (%):  [55 %-60 %] 60 %  SpO2:  [88 %-97 %] 96 %    Intake/Output Summary (Last 24 hours) at 9/13/2021 1433  Last data  filed at 9/13/2021 1200  Gross per 24 hour   Intake 1850 ml   Output 3425 ml   Net -1575 ml     General: lying in bed, NAD  HEENT: anicteric, moist mucosa  Chest: CTAB, no wheezing  Cardiac: RRR no murmurs  Abdomen: Soft, flat, non tender, active BS  Extremities: No LE Edema  Neuro: A&Ox3, no focal deficits   Skin: no rash or wound noted    Labs and imaging: All laboratory and imaging data personally reviewed.    WBC 27 (s)  CRP remains negative

## 2021-09-13 NOTE — PROGRESS NOTES
CLINICAL NUTRITION SERVICES - REASSESSMENT NOTE     Nutrition Prescription    RECOMMENDATIONS FOR MDs/PROVIDERS TO ORDER:  None at this time     Malnutrition Status:    Moderate (Non-severe) malnutrition in the context of chronic illness     Recommendations already ordered by Registered Dietitian (RD):  Pt may order snacks/supplements PRN (pt referred to order vs scheduled)     Future/Additional Recommendations:  Will continue to revise estimated energy/protein needs pending weight trends   Continue to monitor appetite, oral intake, use of supplements   Need for additional transplant nutrition education     If enteral nutrition is needed:   Pre transplant: Osmolite 1.5 Conner @ goal of  50ml/hr (1200 ml/day) + 1 packet prosource  will provide: 1840 kcals (28 kcal/kg), 86g PRO (1.3) , 914 ml free H20, 244 g CHO, and 0 g fiber daily.     Post transplant: Osmolite 1.5 Conner @ goal of  60ml/hr (1440ml/day) + 1 packet prosurce  will provide: 2200 kcals (34 kca/kg), 101 g PRO (1.5 g/kg), 1097 ml free H20, 293 g CHO, and 0 g fiber daily.     EVALUATION OF THE PROGRESS TOWARD GOALS   Diet: Regular  Intake: 100%  Patient reported appetite has not really improved since last week.  Health Touch reviewed and patient is ordering 2-3 meals per day. Past 4 days if 100% consumed, calorie range 3172-9895 and protein 45-84 g/day.  Daily ranges within calorie goals and average protein intake of 62 g/day     NEW FINDINGS   Weight Trends:  09/13/21 0020 64 kg (141 lb 1.5 oz)   09/12/21 0500 68.6 kg (151 lb 3.8 oz)   09/11/21 0347 68.5 kg (151 lb 0.2 oz)   09/10/21 0400 68.1 kg (150 lb 1.6 oz)   09/08/21 0626 68.7 kg (151 lb 8 oz)   09/07/21 0400 72.6 kg (160 lb)   09/06/21 0340 72.9 kg (160 lb 11.5 oz)   09/05/21 1752 73.3 kg (161 lb 8 oz)   - Patient questioned decrease in weight to 64 kg- weight taken on bed scale.     Dosing weight revised to 64 kg   Estimated Energy Needs: 7331-9506 kcals/day (25 - 30 kcals/kg)   Justification:  Maintenance   Estimated Protein Needs: 76-96 grams protein/day (1.2 - 1.5 grams of pro/kg)   Justification: Increased needs during hospitalization   Estimated Fluid Needs: 3915-2807 mL/day (1 mL/kcal)   Justification: Maintenance     ASSESSED NUTRITION NEEDS POST TRANSPLANT   Estimated Energy Needs: 2690-2973 kcals/day (30 - 35 kcals/kg)   Justification: Increased needs post surgery   Estimated Protein Needs:  grams protein/day (1.3 -2 grams of pro/kg)   Justification: Post op/s/p transplant   Estimated Fluid Needs: 9389-7945 mL/day (25-30 mL/kcal)   Justification: Maintenance    GI: No nausea noted. Last bowel movement, 9/13.   Skin: No new deficits   Labs: K+ 3.3 (L), Creatinine 0.54 (L), Glucose 148 (H)  Medications: Cholestyramine, Novolog, Lantus, Levothyroxine, Methylprednisolone, Mycophenolate, Protonix      MALNUTRITION  % Intake: Decreased intake does not meet criteria  % Weight Loss: >5% in one month (severe), weight loss from 72-68 kg (if 64 kg inaccurate) continues to meet criteria  Subcutaneous Fat Loss: None observed  Muscle Loss: Temporal:  mild and Dorsal hand:  moderate  Fluid Accumulation/Edema: None noted  Malnutrition Diagnosis: Moderate (Non-severe) malnutrition in the context of chronic illness     Previous Goals   Patient to consume % of nutritionally adequate meal trays TID, or the equivalent with supplements/snacks.  Evaluation: Not Met- protein under goal range     Previous Nutrition Diagnosis  Food- and nutrition-related knowledge deficit related to lack of prior education as evidenced by patient report of transplant recommendations     Evaluation: Improving    CURRENT NUTRITION DIAGNOSIS  Inadequate protein intake related to food choices as evidenced by patient consuming 45-84 g protein/day with average of 62 g/day (80%)     INTERVENTIONS  Implementation  Collaboration with other providers  Medical food supplement therapy  Nutrition education for nutrition relationship to  health/disease- discussed post transplant nutrition therapy, possible need for feeding tube with diet advancement as medically able   Nutrition education for recommended modifications- encouraged adequate protein intake for LBM      Goals  Patient to consume % of nutritionally adequate meal trays TID, or the equivalent with supplements/snacks.    Monitoring/Evaluation  Progress toward goals will be monitored and evaluated per protocol.    Janny Khan RD, LD  6B pager: [phone]

## 2021-09-13 NOTE — PROVIDER NOTIFICATION
09/13/21 0000   Call Information   Date of Call 09/13/21   Time of Call 0045   Name of person requesting the team Xuan   Title of person requesting team RN   RRT Arrival time 0048   Time RRT ended 0100   Reason for call   Type of RRT Adult   Primary reason for call Sepsis suspected   Sepsis Suspected Elevated Lactate level;RR > 20, SaO2 <90% OR increasing O2 need;WBC <4 or >12   Was patient transferred from the ED, ICU, or PACU within last 24 hours prior to RRT call? No   SBAR   Situation LA 3.4   Background 56 year old male with PMH ILD and rheumatoid lung disease, RA, SALTY, hypothyroid, HTN,anxiety and depression, HLD, duodenal anomaly admitted on 9/5/2021 in transfer for lung transplant workup and risk of need for ECMO.    Notable History/Conditions End-Stage disease;Hypertension   Assessment sleeping on bipap, VSS   Interventions Fluid bolus   Adjustments to Recommend LA with am labs   Patient Outcome   Patient Outcome Stabilized on unit   RRT Team   Attending/Primary/Covering Physician Gold 10   Date Attending Physician notified 09/13/21   Time Attending Physician notified 0055   Physician(s) Leah Dye NP   Lead RN Nathan Govea   RT n/a   Post RRT Intervention Assessment   Post RRT Assessment Stable/Improved   Date Follow Up Done 09/13/21   Time Follow Up Done 0412   Comments pt. sleeping on bipap, VSS

## 2021-09-13 NOTE — PROGRESS NOTES
Rapid Response Team Note    Assessment   In assessment a rapid response was called on Edson Thornton due to SIRS/Sepsis trigger and lactic acidosis. This presentation is likely due to ILD and chronic respiratory failure and worsened by Bipap and decreased fluid intake. Has triggered the sepsis protocol almost nightly and this is the first time it has been elevated. No new symptoms. No sign of fluid overload and last EF of 60%. No fevers    Plan   -  500 ml LR over 2 hours  - repeat lactic acid with AM labs.      -  The Internal Medicine primary team was paged and currently awaiting a response.  -  Disposition: The patient will remain on the current unit. We will continue to monitor this patient closely.  -  Reassessment and plan follow-up will be performed by the primary team      ILIR Shepherd CNP  [facility] [place] RRT [facility] Job Code Contact #[phone]    Hospital Course   Brief Summary of events leading to rapid response:   BPA for sepsis eval due to leukocytosis and RR of 22    Admission Diagnosis:   ILD (interstitial lung disease) (H) [J84.9]     Physical Exam   Temp: 97.8  F (36.6  C) Temp  Min: 97.2  F (36.2  C)  Max: 98.1  F (36.7  C)  Resp: 24 Resp  Min: 18  Max: 24  SpO2: 96 % SpO2  Min: 90 %  Max: 97 %  Pulse: 75 Pulse  Min: 56  Max: 75    No data recorded  BP: 125/77 Systolic (24hrs), Av , Min:124 , Max:160   Diastolic (24hrs), Av, Min:71, Max:84     I/Os: I/O last 3 completed shifts:  In: 1620 [P.O.:1390; I.V.:230]  Out: 2550 [Urine:2550]     Exam:   General: in no acute distress  Mental Status: baseline mental status.      Significant Results and Procedures   Lactic Acid:   Recent Labs   Lab Test 21  0028 21  2343 21  2025   LACT 3.4* 1.0 1.3     CBC:   Recent Labs   Lab Test 21  0615 21  0448 09/10/21  0616   WBC 23.9* 22.8* 25.1*   HGB 9.4* 9.3* 9.3*   HCT 29.8* 29.3* 30.6*   * 123* 132*        Sepsis Evaluation   The patient is known to have an  infection.    Anti-infectives (From now, onward)    Start     Dose/Rate Route Frequency Ordered Stop    09/10/21 0900  sulfamethoxazole-trimethoprim (BACTRIM DS) 800-160 MG per tablet 1 tablet      1 tablet Oral Once per day on Mon Wed Fri 09/09/21 1327      09/09/21 2000  fluconazole (DIFLUCAN) tablet 400 mg      400 mg Oral DAILY 09/09/21 1231      09/07/21 1400  azithromycin (ZITHROMAX) tablet 250 mg      250 mg Oral DAILY 09/07/21 1343      09/05/21 1900  ceFEPIme (MAXIPIME) 2 g vial to attach to  ml bag for ADULTS or 50 ml bag for PEDS      2 g  over 30 Minutes Intravenous EVERY 8 HOURS 09/05/21 1724          Current antibiotic coverage is appropriate for source of infection.

## 2021-09-14 ENCOUNTER — APPOINTMENT (OUTPATIENT)
Dept: PHYSICAL THERAPY | Facility: CLINIC | Age: 56
End: 2021-09-14
Attending: STUDENT IN AN ORGANIZED HEALTH CARE EDUCATION/TRAINING PROGRAM
Payer: COMMERCIAL

## 2021-09-14 LAB
ANION GAP SERPL CALCULATED.3IONS-SCNC: 7 MMOL/L (ref 3–14)
BUN SERPL-MCNC: 14 MG/DL (ref 7–30)
CALCIUM SERPL-MCNC: 8.6 MG/DL (ref 8.5–10.1)
CHLORIDE BLD-SCNC: 104 MMOL/L (ref 94–109)
CO2 SERPL-SCNC: 25 MMOL/L (ref 20–32)
CREAT SERPL-MCNC: 0.61 MG/DL (ref 0.66–1.25)
ERYTHROCYTE [DISTWIDTH] IN BLOOD BY AUTOMATED COUNT: 16.7 % (ref 10–15)
GFR SERPL CREATININE-BSD FRML MDRD: >90 ML/MIN/1.73M2
GLUCOSE BLD-MCNC: 110 MG/DL (ref 70–99)
GLUCOSE BLDC GLUCOMTR-MCNC: 128 MG/DL (ref 70–99)
GLUCOSE BLDC GLUCOMTR-MCNC: 143 MG/DL (ref 70–99)
GLUCOSE BLDC GLUCOMTR-MCNC: 149 MG/DL (ref 70–99)
GLUCOSE BLDC GLUCOMTR-MCNC: 168 MG/DL (ref 70–99)
GLUCOSE BLDC GLUCOMTR-MCNC: 196 MG/DL (ref 70–99)
GLUCOSE BLDC GLUCOMTR-MCNC: 82 MG/DL (ref 70–99)
HCT VFR BLD AUTO: 32.8 % (ref 40–53)
HGB BLD-MCNC: 10.1 G/DL (ref 13.3–17.7)
LACTATE SERPL-SCNC: 1.3 MMOL/L (ref 0.7–2)
MCH RBC QN AUTO: 28.4 PG (ref 26.5–33)
MCHC RBC AUTO-ENTMCNC: 30.8 G/DL (ref 31.5–36.5)
MCV RBC AUTO: 92 FL (ref 78–100)
PLATELET # BLD AUTO: 108 10E3/UL (ref 150–450)
POTASSIUM BLD-SCNC: 3.6 MMOL/L (ref 3.4–5.3)
RBC # BLD AUTO: 3.56 10E6/UL (ref 4.4–5.9)
SODIUM SERPL-SCNC: 136 MMOL/L (ref 133–144)
WBC # BLD AUTO: 22.5 10E3/UL (ref 4–11)

## 2021-09-14 PROCEDURE — 36592 COLLECT BLOOD FROM PICC: CPT | Performed by: STUDENT IN AN ORGANIZED HEALTH CARE EDUCATION/TRAINING PROGRAM

## 2021-09-14 PROCEDURE — 250N000013 HC RX MED GY IP 250 OP 250 PS 637: Performed by: STUDENT IN AN ORGANIZED HEALTH CARE EDUCATION/TRAINING PROGRAM

## 2021-09-14 PROCEDURE — 80048 BASIC METABOLIC PNL TOTAL CA: CPT | Performed by: STUDENT IN AN ORGANIZED HEALTH CARE EDUCATION/TRAINING PROGRAM

## 2021-09-14 PROCEDURE — 250N000013 HC RX MED GY IP 250 OP 250 PS 637: Performed by: INTERNAL MEDICINE

## 2021-09-14 PROCEDURE — 250N000011 HC RX IP 250 OP 636: Performed by: STUDENT IN AN ORGANIZED HEALTH CARE EDUCATION/TRAINING PROGRAM

## 2021-09-14 PROCEDURE — 94660 CPAP INITIATION&MGMT: CPT

## 2021-09-14 PROCEDURE — 999N000157 HC STATISTIC RCP TIME EA 10 MIN

## 2021-09-14 PROCEDURE — 250N000013 HC RX MED GY IP 250 OP 250 PS 637: Performed by: PHYSICIAN ASSISTANT

## 2021-09-14 PROCEDURE — 85027 COMPLETE CBC AUTOMATED: CPT | Performed by: STUDENT IN AN ORGANIZED HEALTH CARE EDUCATION/TRAINING PROGRAM

## 2021-09-14 PROCEDURE — 120N000003 HC R&B IMCU UMMC

## 2021-09-14 PROCEDURE — 97110 THERAPEUTIC EXERCISES: CPT | Mod: GP | Performed by: PHYSICAL THERAPIST

## 2021-09-14 PROCEDURE — 83605 ASSAY OF LACTIC ACID: CPT | Performed by: STUDENT IN AN ORGANIZED HEALTH CARE EDUCATION/TRAINING PROGRAM

## 2021-09-14 PROCEDURE — 250N000011 HC RX IP 250 OP 636: Performed by: PHYSICIAN ASSISTANT

## 2021-09-14 PROCEDURE — 99232 SBSQ HOSP IP/OBS MODERATE 35: CPT | Performed by: INTERNAL MEDICINE

## 2021-09-14 PROCEDURE — 99207 PR CDG-MDM COMPONENT: MEETS MODERATE - UP CODED: CPT | Performed by: INTERNAL MEDICINE

## 2021-09-14 PROCEDURE — 250N000012 HC RX MED GY IP 250 OP 636 PS 637: Performed by: INTERNAL MEDICINE

## 2021-09-14 RX ADMIN — AZITHROMYCIN MONOHYDRATE 250 MG: 250 TABLET ORAL at 07:56

## 2021-09-14 RX ADMIN — PANTOPRAZOLE SODIUM 40 MG: 40 TABLET, DELAYED RELEASE ORAL at 16:15

## 2021-09-14 RX ADMIN — FLUCONAZOLE 400 MG: 200 TABLET ORAL at 19:54

## 2021-09-14 RX ADMIN — FLUTICASONE FUROATE 1 PUFF: 100 POWDER RESPIRATORY (INHALATION) at 07:56

## 2021-09-14 RX ADMIN — INSULIN GLARGINE 5 UNITS: 100 INJECTION, SOLUTION SUBCUTANEOUS at 07:56

## 2021-09-14 RX ADMIN — CHOLESTYRAMINE 8 G: 4 POWDER, FOR SUSPENSION ORAL at 07:56

## 2021-09-14 RX ADMIN — CHOLESTYRAMINE 8 G: 4 POWDER, FOR SUSPENSION ORAL at 16:15

## 2021-09-14 RX ADMIN — MYCOPHENOLATE MOFETIL 500 MG: 500 TABLET, FILM COATED ORAL at 07:56

## 2021-09-14 RX ADMIN — Medication 3 MG: at 19:54

## 2021-09-14 RX ADMIN — INSULIN ASPART 5 UNITS: 100 INJECTION, SOLUTION INTRAVENOUS; SUBCUTANEOUS at 17:32

## 2021-09-14 RX ADMIN — AMLODIPINE BESYLATE 5 MG: 5 TABLET ORAL at 07:56

## 2021-09-14 RX ADMIN — CHOLESTYRAMINE 8 G: 4 POWDER, FOR SUSPENSION ORAL at 19:56

## 2021-09-14 RX ADMIN — MYCOPHENOLATE MOFETIL 500 MG: 500 TABLET, FILM COATED ORAL at 18:27

## 2021-09-14 RX ADMIN — METHYLPREDNISOLONE SODIUM SUCCINATE 125 MG: 125 INJECTION, POWDER, FOR SOLUTION INTRAMUSCULAR; INTRAVENOUS at 07:56

## 2021-09-14 RX ADMIN — HEPARIN SODIUM 5000 UNITS: 5000 INJECTION, SOLUTION INTRAVENOUS; SUBCUTANEOUS at 07:56

## 2021-09-14 RX ADMIN — PANTOPRAZOLE SODIUM 40 MG: 40 TABLET, DELAYED RELEASE ORAL at 07:56

## 2021-09-14 RX ADMIN — CEFEPIME HYDROCHLORIDE 2 G: 2 INJECTION, POWDER, FOR SOLUTION INTRAVENOUS at 20:00

## 2021-09-14 RX ADMIN — ESCITALOPRAM OXALATE 5 MG: 5 TABLET, FILM COATED ORAL at 19:54

## 2021-09-14 RX ADMIN — HEPARIN SODIUM 5000 UNITS: 5000 INJECTION, SOLUTION INTRAVENOUS; SUBCUTANEOUS at 19:56

## 2021-09-14 RX ADMIN — INSULIN ASPART 5 UNITS: 100 INJECTION, SOLUTION INTRAVENOUS; SUBCUTANEOUS at 12:17

## 2021-09-14 RX ADMIN — CEFEPIME HYDROCHLORIDE 2 G: 2 INJECTION, POWDER, FOR SOLUTION INTRAVENOUS at 12:16

## 2021-09-14 RX ADMIN — CEFEPIME HYDROCHLORIDE 2 G: 2 INJECTION, POWDER, FOR SOLUTION INTRAVENOUS at 04:20

## 2021-09-14 RX ADMIN — LEVOTHYROXINE SODIUM 25 MCG: 0.03 TABLET ORAL at 07:56

## 2021-09-14 ASSESSMENT — ACTIVITIES OF DAILY LIVING (ADL)
ADLS_ACUITY_SCORE: 19

## 2021-09-14 ASSESSMENT — MIFFLIN-ST. JEOR: SCORE: 1391

## 2021-09-14 NOTE — PLAN OF CARE
Neuro: A/Ox4, pleasant. Follows commands appropriately.  Cardiac: SR/SB with HR 59-80s. VSS.   Respiratory: O2 sats stable on Bipap 50% FiO2 while sleeping, HFNC 50% FiO2 on 35L while awake (60-70% needed while ambulating). Sating > 90%. Still with MYERS, denies at rest.  GI/: Adequate urine output, BM yesterday (none this shift).  Diet/appetite: Tolerating regular diet.   Activity:  Ax SBA. Turns independently.  Pain: At acceptable level on current regimen. Denies pain.  Skin: Intact, no new deficits noted.  LDA's: R triple lumen PICC wdl. Infusing TKO/atb.    Plan: Continue with POC. Notify primary team with changes.

## 2021-09-14 NOTE — PROGRESS NOTES
Transplant Social Work Services Progress Note      Date of Initial Social Work Evaluation: Pt well known to writer through the transplant program.  Collaborated with: The patient and the mobile notary.    Data: Pt needed a mobile notary to sign his Durable POA forms.   Intervention: Writer arranged for a mobile notary to come to the hospital to witness [place] signing his documents. Writer will send copies of his documents to his home. He will give the originals to his family this weekend.   Assessment: Pt was otherwise doing well. In good spirits. Glad to complete the document.   Education provided by SW: Educated pt on the process for getting a notary.  Plan:    Discharge Plans in Progress: NA    Barriers to d/c plan: Pt is not medically capable of being out of the hospital due to his high Oxygen needs.    Follow up Plan: SW will continue to follow for resources, education and support.    Leisa Navarro, [facility]  [phone]

## 2021-09-14 NOTE — PROGRESS NOTES
[facility]    Progress Note    Date of Service (when I saw the patient): 09/14/2021    Assessment & Plan   Edson Thornton is a 56 year old male with history of NSIP associated with rheumatoid arthritis and traction bronchiectasis who was admitted on 9/5/2021 with acute on chronic hypoxic respiratory failure likely from ILD. Weaning steroids now in preparation for transplant.        1. Acute hypoxic respiratory failure 2/2 progressive ILD, all are POA  -Appreciate pulm consult  -Continue BiPAP at night, HFNC during day  -Continue empiric abx with cefepime, azithro, fluconazole  -Continue steroid wean, methylpred 125 mg daily x 3d starting 9/12, then pred 60 mg daily. Needs to be on prednisone 20 mg daily or less prior to eligibility to be listed.  -Started MMF per pulmology     2. Rheumatoid arthritis, all are POA  Patient seen by rheumatology this admission. No acute flare of rheumatoid arthritis. SINCERE antibodies negative, flow cytometry negative for CD19 cells, CK normal, aldolase normal. Anti-Irene-1 negative. The patient was treated with leflunomide from 5/21 to 7/21. Rheumatology considers that leflunomide toxicity might contribute to the patient's ILD. -Continue cholestyramine TID to increase leflunomide clearance.      3. 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 chronic medical problems  Hypothyroidism   Continue 25 mg levothyroxin qday     SALTY  Continue BiPAP at night.      Hypertension  On 5mg amlodipine, BP well controlled today.     Anxiety  Depression  Continue 5mg escitalopram        DVT Prophylaxis: UFH 5000U, q12h  Code Status: Full Code     Disposition: Pending prednisone taper, anticipate prolonged hospitalization due to potential disease progression and lung transplant.  Jordan Sears    Interval History   The patient reported no change in his  shortness of breath.  No reported new chest pain or cough.  Four-point review of systems is otherwise negative    -Data reviewed today: I reviewed all new labs and imaging results over the last 24 hours.    Physical Exam   Temp: 97.6  F (36.4  C) Temp src: Oral BP: 126/74 Pulse: 63   Resp: 18 SpO2: 91 % O2 Device: High Flow Nasal Cannula (HFNC) Oxygen Delivery: 35 LPM  Vitals:    09/13/21 0020 09/13/21 2116 09/14/21 0554   Weight: 64 kg (141 lb 1.5 oz) 70.6 kg (155 lb 10.3 oz) 65 kg (143 lb 4.8 oz)     Vital Signs with Ranges  Temp:  [97.4  F (36.3  C)-98.3  F (36.8  C)] 97.6  F (36.4  C)  Pulse:  [63-83] 63  Resp:  [17-24] 18  BP: (122-149)/(74-85) 126/74  FiO2 (%):  [50 %-70 %] 50 %  SpO2:  [90 %-97 %] 91 %  I/O last 3 completed shifts:  In: 1943 [P.O.:1940; I.V.:3]  Out: 3300 [Urine:3300]    Constitutional:   Increased work of breathing, AAOx3, NAD  Respiratory: On high flow nc oxygen, coarse breathing sounds with diffuse crackles on both lung fields  Cardiovascular: RRR, no r/m/g, S1, S2  GI: nontender  Skin/Integumen: warm, no jaundice, mild edema at the level of the ankles.    Medications     - MEDICATION INSTRUCTIONS -       - MEDICATION INSTRUCTIONS -         amLODIPine  5 mg Oral Daily     azithromycin  250 mg Oral Daily     ceFEPIme (MAXIPIME) IV  2 g Intravenous Q8H     cholestyramine  2 packet Oral TID     escitalopram  5 mg Oral QPM     fluconazole  400 mg Oral Daily     fluticasone  1 puff Inhalation Daily     heparin ANTICOAGULANT  5,000 Units Subcutaneous Q12H     insulin aspart   Subcutaneous TID AC     insulin aspart  1-7 Units Subcutaneous TID AC     insulin aspart  1-5 Units Subcutaneous At Bedtime     insulin glargine  5 Units Subcutaneous QAM AC     levothyroxine  25 mcg Oral QAM AC     melatonin  3 mg Oral At Bedtime     mycophenolate  500 mg Oral BID IS     pantoprazole  40 mg Oral BID AC     [START ON 9/15/2021] predniSONE  60 mg Oral Daily     sodium chloride (PF)  3 mL Intracatheter Q8H      sulfamethoxazole-trimethoprim  1 tablet Oral Once per day on Mon Wed Fri       Data   Recent Labs   Lab 09/14/21  0754 09/14/21  0439 09/14/21  0406 09/13/21  0449 09/12/21  0615 09/08/21  0743 09/08/21  0626 09/07/21  1324   WBC  --  22.5*  --  27.1* 23.9*   < > 23.0* 27.9*   HGB  --  10.1*  --  9.6* 9.4*   < > 9.6* 10.9*   MCV  --  92  --  89 90   < > 86 88   PLT  --  108*  --  138* 129*   < > 126* 153   INR  --   --   --   --   --   --  1.19* 1.16*   NA  --  136  --  136 136   < > 138 138   POTASSIUM  --  3.6  --  3.3* 3.9   < > 4.3 3.9   CHLORIDE  --  104  --  106 105   < > 107 107   CO2  --  25  --  29 24   < > 26 25   BUN  --  14  --  13 17   < > 22 23   CR  --  0.61*  --  0.54* 0.57*   < > 0.76 0.69   ANIONGAP  --  7  --  1* 7   < > 5 6   ERIK  --  8.6  --  8.1* 8.5   < > 8.6 8.8   GLC 82 110* 128* 148* 224*   < > 160* 151*   ALBUMIN  --   --   --   --   --   --   --  2.4*   PROTTOTAL  --   --   --   --   --   --   --  5.6*   BILITOTAL  --   --   --   --   --   --   --  0.2   ALKPHOS  --   --   --   --   --   --   --  67   ALT  --   --   --   --   --   --   --  39   AST  --   --   --   --   --   --   --  30    < > = values in this interval not displayed.       No results found for this or any previous visit (from the past 24 hour(s)).    Sherri Layne MD

## 2021-09-14 NOTE — PROGRESS NOTES
Antimicrobial Stewardship Team Note    Antimicrobial Stewardship Program - A joint venture between [facility] and  Physicians to optimize antibiotic management.  NOT a formal consult - Restricted Antimicrobial Review     Patient: Edson Thornton  MRN: [id number]  Allergies: Patient has no known allergies.    Brief Summary: Edson Thornton is a 56 year old male with a PMHx of recent diagnosis of ILD and rheumatoid lung disease (home baseline O2 4L NC), RA (treated with leflunomide 5/2021-7/2021, rituxamab 8/6/2021 & 9/2/2021), traction bronchitis, duodenal abnormality, SALTY, HTN, HLD, hypothyroidism, GERD, depression/anxiety who transferred from OSH on 9/5/2021 with acute on chronic hypoxic respiratory failure likely from ILD and initiation of lung transplant workup.    History of Present Illness: Patient's respiratory symptoms started ~ June/July 2021 and have been attributed to RA-associated ILD. Prior to this, he was very active, walking as much as he wanted without limitation. He was originally admitted at OSH 7/28 to 8/9 for shortness of breath felt to be due to ILD. 7/28 bronchoscopy with Candida, started on fluconazole. He then represented to OSH on 8/30 with chest CT showing progressive pulmonary interstitial pneumonia and fibrosis and findings suggestive of a pulmonary hypertension element with worsening oxygen requirements, on intermittent BiPAP/HFNC. At the OSH infectious workup was largely negative (COVID-19, influenza, RSV, parainfluenza; 5/11 TB quant negative; 9/2 histo, blasto, coccicioides, aspergillus antibodies negative; Mycoplasma IgG +, IgM -).  Bronchoscopy not attempted due to patient requiring heated high flow initially. He was started on high dose steroids and doxycycline, cefepime, metronidazole, and fluconazole. He was also started on bactrim for PCP prophylaxis given recent steroid use, that was then increased to treatment dosing on 9/2. Low suspicion for cardiogenic source  of hypoxia. Rheumatology was consulted and decided to give an additional dose of rituxamab with overall low suspicion for infection. At time of transfer, he was on 60L heated high flow (95% FiO2).     Patient was continued on cefepime, doxycycline (subsequently stopped 9/7), fluconazole. Azithromycin was initiated on 9/7. Bactrim was transitioned back to prophylactic dosing on 9/10. Over his hospitalization course as [facility], patient has remained afebrile with very minimal antipyretic use. He has been hemodynamically stable, requiring 35L HFNC/BiPAP (50-60% FiO2). Repeat chest CT on 9/7  with extensive bilateral groundglass and reticular opacities slightly increased from prior. L heart catheterization with no significant CAD, R heart catheterization with moderate pulmonary hypertension.  HSV +, EBV+, CMV -. Patient was deemed appropriate for lung transplant, will be activated when steroids tapered to prednisione 20 mg/day or less. He was started on mycophenolate on 9/11. He continues on cefepime, fluconazole, azithromycin, and bactrim (as prophylaxis).           Active Anti-infective Medications   (From admission, onward)                 Start     Stop    09/10/21 0900  sulfamethoxazole-trimethoprim  1 tablet,   Oral,   Once per day on Mon Wed Fri     Hospital-Acquired Pneumonia        --    09/09/21 2000  fluconazole  400 mg,   Oral,   DAILY     empiric ILD/HAP treatment        --    09/07/21 1400  azithromycin  250 mg,   Oral,   DAILY     Community Acquired Pneumonia        --    09/05/21 1900  ceFEPIme  2 g,   Intravenous,   EVERY 8 HOURS     Hospital-Acquired Pneumonia        --                  Assessment: Progressive ILD - awaiting lung transplant activation, very low suspicion for infection  Since transfer and pursuing lung transplant workup, patient has continued to be clinically stable. His oxygen requirements continue to be high, but stable, if not improved slightly (FiO2 requirements have trended down  slightly). Persistent leukocytosis is likely attributable to receipt of high dose steroids without other systemic infectious markers (e.g., fever). Microbiological data is not suggestive infection. Today is day 14 of cefepime and fluconazole therapy. Overall, low suspicion for bacterial or fungal infection presently. Given that patient has received an adequate course for presumed pneumonia, recommend stopping cefepime and fluconazole. Candida grown in previous BAL is most reflective of colonization in light of steroid therapy, noting Candida in the sputum is very rarely pathogenic even in the setting of immunosuppression.  Acknowledge patient is high risk for possibly infection with steroids and recent rituxamab doses. However, the risks associated with continued therapy (I.e., C.diff) seem to outweigh the benefits presently. Agree with continued bactrim prophylaxis and azithromycin for immunomodulating effects.     Recommendations:  Stop cefepime and fluconazole  Agree with continued bactrim prophylaxis and azithromycin for immunomodulating effects      Discussed with ID Staff SUAD Gallego, PharmD, BCIDP  Pager: [phone]      Vital Signs/Clinical Features:  Vitals         09/12 0700  -  09/13 0659 09/13 0700  -  09/14 0659 09/14 0700  -  09/14 1327   Most Recent    Temp ( F) 96.9 -  98.1    97.4 -  98.3    97.6 -  98.2     98.2 (36.8)    Pulse 60 -  75    65 -  83    63 -  71     71    Resp 20 -  24    17 -  24    18 -  19     19    /77 -  160/84    122/76 -  149/85    126/74 -  135/81     135/81    SpO2 (%) 90 -  97    88 -  97    91 -  97     97            Labs  Estimated Creatinine Clearance: 124.3 mL/min (A) (based on SCr of 0.61 mg/dL (L)).  Recent Labs   Lab Test 09/09/21  0536 09/10/21  0616 09/11/21 0448 09/12/21  0615 09/13/21 0449 09/14/21  0439   CR 0.65* 0.61* 0.56* 0.57* 0.54* 0.61*       Recent Labs   Lab Test 09/09/21  0536 09/10/21  0616 09/11/21  0448  09/12/21  0615 09/13/21  0449 09/14/21  0439   WBC 22.4* 25.1* 22.8* 23.9* 27.1* 22.5*   HGB 10.0* 9.3* 9.3* 9.4* 9.6* 10.1*   HCT 31.3* 30.6* 29.3* 29.8* 29.9* 32.8*   MCV 87 93 90 90 89 92   * 132* 123* 129* 138* 108*       Recent Labs   Lab Test 09/07/21  1324   BILITOTAL 0.2   ALKPHOS 67   ALBUMIN 2.4*   AST 30   ALT 39       Recent Labs   Lab Test 09/07/21  1324 09/08/21  0816 09/09/21  0536 09/09/21 2025 09/11/21 0448 09/11/21  2343 09/13/21  0028 09/13/21 0449 09/14/21  0439   LACT  --  1.1  --  1.3  --  1.0 3.4* 1.5 1.3   CRP <2.9  --  <2.9  --  <2.9  --   --  <2.9  --              Culture Results:  7-Day Micro Results       Procedure Component Value Units Date/Time    Urine Culture Aerobic Bacterial [[id number]] Collected: 09/07/21 1402    Order Status: Completed Lab Status: Final result Updated: 09/08/21 1244    Specimen: Urine, Midstream      Culture No Growth            Recent Labs   Lab Test 09/07/21  1402   URINEPH 6.0   NITRITE Negative   LEUKEST Negative   WBCU 1                         Imaging: XR Chest 2 Views    Result Date: 9/7/2021  EXAM: XR CHEST 2 VW  9/7/2021 3:33 PM  HISTORY: Lung transplant consult COMPARISON: Chest x-ray 9/7/2021 (14:49) FINDINGS: AP and lateral radiographs of the chest. Right upper extremity PICC tip projects over the high right atrium. The trachea is midline. The cardiac silhouette is partially obscured. No pleural effusion or pneumothorax. Extensive bilateral mixed interstitial and airspace opacities, left greater than right. The visualized upper abdomen is unremarkable. Chronic lateral right clavicle deformity.     IMPRESSION: Extensive bilateral mixed interstitial/fibrotic and airspace opacities, left greater than right. I have personally reviewed the examination and initial interpretation and I agree with the findings. ANUPAM GASTON DO   SYSTEM ID:  [id number]    NM Lung Scan Perfusion Particulate    Result Date: 9/7/2021  Examination: NM LUNG SCAN  PERFUSION PARTICULATE   Date:  9/7/2021 2:41 PM Indication: Urgent lung transplant evaluation Previous Study: none Additional Information: Acute on chronic hypoxic respiratory failure in the setting of interstitial lung disease and rheumatoid lung disease Technique: The patient received 7.2 mCi of Tc-99m labeled MAA intravenously. Anterior and posterior quantitative views were obtained of the lungs using a dual headed camera camera. A standard eight view lung perfusion scan was also obtained. Calculations were performed using the geometric mean technique. Findings: There were no previous studies for comparison. The perfusion images demonstrate no significant focal perfusion defect. There is mildly heterogenous uptake in the upper lobes which is likely related to the acute lung process seen by radiography. The quantitative evaluation shows that there is 53% function on the right as compared to 47% function on the left. Within the right lung there is approximately 10% in the upper 1/3, 23% in the middle 1/3, and 20% in the lower 1/3. Within the left lung there is approximately 11% in the upper 1/3, 20% in the middle 1/3, and 17% in the lower 1/3.     Impression: Quantitative evaluation shows 53% contribution of the right lung as compared to 47% contribution of the left lung. I have personally reviewed the examination and initial interpretation and I agree with the findings. FREDDY LEAHY MD   SYSTEM ID:  [id number]    XR Chest 1 View    Result Date: 9/7/2021  EXAM: XR CHEST 1 VIEW  9/7/2021 3:32 PM  HISTORY: Assess lung size COMPARISON: Chest x-ray 9/5/2021, nuclear medicine perfusion scan 9/7/2021, chest CT 8/30/2021 FINDINGS: Semiupright AP radiograph of the chest. Right upper extremity PICC tip projects over the high right atrium. The trachea is midline. The cardiac silhouette is obscured. No pleural effusion or pneumothorax. Diffuse bilateral mixed interstitial and airspace opacities are stable to slightly  increased compared to prior. Average to slightly decreased lung volumes when assessed subjectively on this AP radiograph. The visualized upper abdomen is unremarkable.     IMPRESSION: 1. Diffuse bilateral mixed interstitial/fibrotic and airspace opacities are stable to slightly increased compared to prior. 2. Average to slightly decreased volume of the lungs. Please see same-day nuclear medicine perfusion scan for details regarding lung perfusion. I have personally reviewed the examination and initial interpretation and I agree with the findings. ANUPAM GASTON DO   SYSTEM ID:  [id number]    Cardiac Catheterization    Result Date: 2021    Right sided filling pressures are mildly elevated.   Moderately elevated pulmonary artery hypertension.   Left sided filling pressures are normal.   Normal cardiac output level.   Normal coronary arteries with mild tortuosity      Echocardiogram Complete    Result Date: 2021  [id number]^JESSY^VANNESA^AMADO  [facility] [address] [place] [id number]  Name: SHAYNA GUERRIER MRN: [id number] : 1965 Study Date: 2021 01:32 PM Age: 56 yrs Gender: Male Patient Location: [facility] Reason For Study: Lung transplant Ordering Physician: VANNESA JIMÉNEZ Referring Physician: SAUNDRA GILL Performed By: Ayaka Tong RDCS  BSA: 1.8 m2 Height: 64 in Weight: 160 lb HR: 74 BP: 133/86 mmHg ______________________________________________________________________________ Procedure Bubble Echocardiogram with two-dimensional, color and spectral Doppler performed. Echocardiogram with two-dimensional, color and spectral Doppler performed. ______________________________________________________________________________ Interpretation Summary Global and regional left ventricular function is normal with an EF of 60-65%. Global right ventricular function is normal. No significant valvular  abnormalities present. The patent foramen ovale was demonstrated by color Doppler . Estimated pulmonary artery systolic pressure is 40 mmHg. Pulmonary hypertension is present. The inferior vena cava was normal in size with preserved respiratory variability. No pericardial effusion is present. ______________________________________________________________________________ Left Ventricle Left ventricular size is normal. Left ventricular wall thickness is normal. Global and regional left ventricular function is normal with an EF of 60-65%. Left ventricular diastolic function is normal. No regional wall motion abnormalities are seen.  Right Ventricle The right ventricle is normal size. Global right ventricular function is normal.  Atria The right atria appears normal. Mild left atrial enlargement is present. The patent foramen ovale was demonstrated by color Doppler .  Mitral Valve The mitral valve is normal. Trace mitral insufficiency is present.  Aortic Valve Aortic valve is normal in structure and function.  Tricuspid Valve Trace to mild tricuspid insufficiency is present. Estimated pulmonary artery systolic pressure is 37 mmHg plus right atrial pressure. Pulmonary hypertension is present.  Pulmonic Valve The pulmonic valve is normal.  Vessels The aorta root is normal. The inferior vena cava was normal in size with preserved respiratory variability.  Pericardium No pericardial effusion is present.  Miscellaneous No significant valvular abnormalities present.  Compared to Previous Study Previous study not available for comparison. ______________________________________________________________________________ MMode/2D Measurements & Calculations IVSd: 0.95 cm LVIDd: 4.6 cm LVIDs: 2.7 cm LVPWd: 0.92 cm FS: 41.8 % LV mass(C)d: 144.4 grams LV mass(C)dI: 81.2 grams/m2 Ao root diam: 3.0 cm asc Aorta Diam: 3.2 cm LVOT diam: 2.0 cm LVOT area: 3.1 cm2 LA Volume (BP): 63.3 ml  LA Volume Index (BP): 35.6 ml/m2 RWT: 0.40  Doppler  Measurements & Calculations MV E max vivi: 109.0 cm/sec MV A max vivi: 69.4 cm/sec MV E/A: 1.6 MV dec slope: 695.0 cm/sec2 PA acc time: 0.08 sec TR max vivi: 280.5 cm/sec TR max P.7 mmHg E/E' av.8 Lateral E/e': 8.8 Medial E/e': 10.9  ______________________________________________________________________________ Report approved by: Jazmin Alonzo 2021 02:34 PM       XR Pelvis w 1 View Each Hip    Result Date: 2021  EXAM: XR PELVIS AND HIP BILATERAL 1 VIEW  2021 3:34 PM  HISTORY: Lung transplant consult COMPARISON: None FINDINGS: AP and frog-leg lateral views of the pelvis. No acute osseous abnormality. No radiographic evidence of avascular necrosis. Mild to moderate degenerative changes of the hips. Bones appear osteopenic. Soft tissues unremarkable.     IMPRESSION: Mild to moderate degenerative changes in the hips. JAMES PADILLA MD (Joe)   SYSTEM ID:  [id number]    XR Chest/Heart Fluoro    Result Date: 2021  Examination:  XR CHEST/HEART FLUORO 2021 3:29 PM Comparison: Chest x-ray 2021 History: Lung transplant consult Findings: The diaphragm was evaluated during both quiet breathing and sniffing in the AP view as well as left and right posterior oblique views. The hemidiaphragms moved downward in a symmetric fashion both when the patient's breathed normally and sniffed.     Impression: Negative sniff test. I, FITO PERALES MD, attest that I was immediately available to provide guidance and assistance during the entirety of the procedure. I have personally reviewed the examination and initial interpretation and I agree with the findings. FITO PERALES MD   SYSTEM ID:  [id number]    XR Lumbar Spine 2/3 Views (aka XRAY)    Result Date: 2021  3 views lumbar spine radiographs 2021 3:33 PM History: Lung transplant consult Additional History from EMR: 56-year-old male with a past medical history including rheumatoid arthritis and interstitial lung disease, now under evaluation  for lung transplantation. Comparison: Same day radiographs of the thoracic spine, CT of chest 8/30/2021 Findings: Standing  AP, lateral including lumbosacral spot film lateral view views of the lumbar spine were obtained. Obliquity of the lateral view noted. 5  lumbar type vertebral bodies are assumed for the purpose of this dictation. There is no acute osseous abnormality.  Degenerative change of the lumbar spine with anterior osteophytosis. Mild disc space narrowing and endplate sclerosis at L5-S1. Lumbar facet arthropathy. Mild degenerative changes of the sacroiliac joints. Partially visualized hip joints appear congruent. Multiple loops of mildly gas distended bowel. Gas pattern is nonobstructive.     Impression: 1.  No compression fractures. 2.  Mild to moderate degenerative changes of the spine as detailed above. I have personally reviewed the examination and initial interpretation and I agree with the findings. MARY LOU FRIEDMAN MD   SYSTEM ID:  [id number]    XR Thoracic Spine 2 Views (aka XRAY)    Result Date: 9/7/2021  Exam: XR THORACIC SPINE 2 VIEWS, 9/7/2021 3:34 PM Indication: Lung transplant consult Comparison: Same day CT chest Findings: AP and lateral radiographs of the thoracic spine were obtained. Mild multilevel endplate degenerative changes of the thoracic spine. No acute osseous findings. No subluxation. Right upper extremity PICC terminating over the right atrium. Extensive interstitial markings throughout the lungs.     Impression: 1. No acute findings involving the thoracic spine. 2. Diffuse interstitial markings throughout the lungs consistent with history of ILD. PREETI SMITH MD   SYSTEM ID:  [id number]    CT Chest Hi-Resolution wo Contrast    Result Date: 9/7/2021  EXAMINATION: CT CHEST HI-RESOLUTION WO CONTRAST, 9/7/2021 3:47 PM TECHNIQUE:  Helical CT images from the thoracic inlet through the upper abdomen were obtained without intravenous contrast, including respiratory and expiratory  images.  Images are displayed at 1 and 5 mm intervals. Images reviewed in lung, soft tissue, and bone windows. Radiation Dose (DLP): 594 mGy*cm COMPARISON: Chest CT 8/30/2021 HISTORY: 57 y/o M h/o NSIP-ILD with concern for progression of disease; CT chest high res to assess disease burden. FINDINGS: Lungs: The central tracheobronchial tree is patent. Small bilateral pleural effusions. Extensive bilateral groundglass and peribronchovascular reticular opacities appear slightly increased compared to chest CT from 8/30/2021. Mild associated traction bronchiectasis. No honeycombing. No significant air trapping on expiratory images. No pneumothorax. Mediastinum: Right upper extremity PICC tip in the right atrium. The thyroid gland is atrophic. Mild atherosclerotic calcification of the aortic arch. The heart is not enlarged. No significant pericardial effusion. Mild coronary artery calcification. The ascending aorta and main pulmonary artery are normal in caliber. Mediastinal lymphadenopathy appears similar to prior, for example a 1.6 x 1.1 cm right paratracheal lymph node (series 3, image 22), a 1.7 x 1.2 cm right paratracheal lymph node (series 3, image 18), and a 1.6 x 1.2 cm prevascular lymph node (series 3, image 22). Small sliding hiatal hernia. Upper abdomen: Partial fatty atrophy of the pancreas. Bones/soft tissues: Degenerative changes of the spine. No acute or suspicious osseous lesions.     IMPRESSION: 1. Extensive bilateral groundglass and reticular opacities are slightly increased compared to chest CT from 8/30/2021. This likely represents progression of known NSIP, however superimposed infection or cannot be excluded. 2. Small bilateral pleural effusions. 3. Stable mediastinal lymphadenopathy, likely reactive. I have personally reviewed the examination and initial interpretation and I agree with the findings. ANUPAM GASTON,    SYSTEM ID:  [id number]

## 2021-09-15 LAB
ANION GAP SERPL CALCULATED.3IONS-SCNC: 8 MMOL/L (ref 3–14)
BUN SERPL-MCNC: 13 MG/DL (ref 7–30)
CALCIUM SERPL-MCNC: 8.5 MG/DL (ref 8.5–10.1)
CHLORIDE BLD-SCNC: 104 MMOL/L (ref 94–109)
CO2 SERPL-SCNC: 26 MMOL/L (ref 20–32)
CREAT SERPL-MCNC: 0.62 MG/DL (ref 0.66–1.25)
CRP SERPL-MCNC: 9.9 MG/L (ref 0–8)
ERYTHROCYTE [DISTWIDTH] IN BLOOD BY AUTOMATED COUNT: 17.2 % (ref 10–15)
GFR SERPL CREATININE-BSD FRML MDRD: >90 ML/MIN/1.73M2
GLUCOSE BLD-MCNC: 136 MG/DL (ref 70–99)
GLUCOSE BLDC GLUCOMTR-MCNC: 111 MG/DL (ref 70–99)
GLUCOSE BLDC GLUCOMTR-MCNC: 124 MG/DL (ref 70–99)
GLUCOSE BLDC GLUCOMTR-MCNC: 167 MG/DL (ref 70–99)
GLUCOSE BLDC GLUCOMTR-MCNC: 168 MG/DL (ref 70–99)
GLUCOSE BLDC GLUCOMTR-MCNC: 206 MG/DL (ref 70–99)
HCT VFR BLD AUTO: 30.2 % (ref 40–53)
HGB BLD-MCNC: 9.8 G/DL (ref 13.3–17.7)
LACTATE SERPL-SCNC: 1.7 MMOL/L (ref 0.7–2)
MCH RBC QN AUTO: 28.7 PG (ref 26.5–33)
MCHC RBC AUTO-ENTMCNC: 32.5 G/DL (ref 31.5–36.5)
MCV RBC AUTO: 88 FL (ref 78–100)
PLATELET # BLD AUTO: 148 10E3/UL (ref 150–450)
POTASSIUM BLD-SCNC: 3.5 MMOL/L (ref 3.4–5.3)
RBC # BLD AUTO: 3.42 10E6/UL (ref 4.4–5.9)
SARS-COV-2 RNA RESP QL NAA+PROBE: NEGATIVE
SODIUM SERPL-SCNC: 138 MMOL/L (ref 133–144)
WBC # BLD AUTO: 23.3 10E3/UL (ref 4–11)

## 2021-09-15 PROCEDURE — 250N000013 HC RX MED GY IP 250 OP 250 PS 637: Performed by: STUDENT IN AN ORGANIZED HEALTH CARE EDUCATION/TRAINING PROGRAM

## 2021-09-15 PROCEDURE — 80048 BASIC METABOLIC PNL TOTAL CA: CPT | Performed by: STUDENT IN AN ORGANIZED HEALTH CARE EDUCATION/TRAINING PROGRAM

## 2021-09-15 PROCEDURE — 250N000013 HC RX MED GY IP 250 OP 250 PS 637: Performed by: PHYSICIAN ASSISTANT

## 2021-09-15 PROCEDURE — 250N000012 HC RX MED GY IP 250 OP 636 PS 637: Performed by: STUDENT IN AN ORGANIZED HEALTH CARE EDUCATION/TRAINING PROGRAM

## 2021-09-15 PROCEDURE — 83605 ASSAY OF LACTIC ACID: CPT | Performed by: INTERNAL MEDICINE

## 2021-09-15 PROCEDURE — 250N000013 HC RX MED GY IP 250 OP 250 PS 637: Performed by: INTERNAL MEDICINE

## 2021-09-15 PROCEDURE — 94660 CPAP INITIATION&MGMT: CPT

## 2021-09-15 PROCEDURE — 99232 SBSQ HOSP IP/OBS MODERATE 35: CPT | Performed by: INTERNAL MEDICINE

## 2021-09-15 PROCEDURE — 250N000011 HC RX IP 250 OP 636: Performed by: PHYSICIAN ASSISTANT

## 2021-09-15 PROCEDURE — 86140 C-REACTIVE PROTEIN: CPT | Performed by: STUDENT IN AN ORGANIZED HEALTH CARE EDUCATION/TRAINING PROGRAM

## 2021-09-15 PROCEDURE — 250N000012 HC RX MED GY IP 250 OP 636 PS 637: Performed by: INTERNAL MEDICINE

## 2021-09-15 PROCEDURE — 999N000157 HC STATISTIC RCP TIME EA 10 MIN

## 2021-09-15 PROCEDURE — 36592 COLLECT BLOOD FROM PICC: CPT | Performed by: STUDENT IN AN ORGANIZED HEALTH CARE EDUCATION/TRAINING PROGRAM

## 2021-09-15 PROCEDURE — 120N000003 HC R&B IMCU UMMC

## 2021-09-15 PROCEDURE — 85027 COMPLETE CBC AUTOMATED: CPT | Performed by: STUDENT IN AN ORGANIZED HEALTH CARE EDUCATION/TRAINING PROGRAM

## 2021-09-15 PROCEDURE — U0003 INFECTIOUS AGENT DETECTION BY NUCLEIC ACID (DNA OR RNA); SEVERE ACUTE RESPIRATORY SYNDROME CORONAVIRUS 2 (SARS-COV-2) (CORONAVIRUS DISEASE [COVID-19]), AMPLIFIED PROBE TECHNIQUE, MAKING USE OF HIGH THROUGHPUT TECHNOLOGIES AS DESCRIBED BY CMS-2020-01-R: HCPCS | Performed by: STUDENT IN AN ORGANIZED HEALTH CARE EDUCATION/TRAINING PROGRAM

## 2021-09-15 RX ORDER — ERGOCALCIFEROL 1.25 MG/1
50000 CAPSULE, LIQUID FILLED ORAL
Status: DISCONTINUED | OUTPATIENT
Start: 2021-09-15 | End: 2021-10-16

## 2021-09-15 RX ADMIN — INSULIN GLARGINE 5 UNITS: 100 INJECTION, SOLUTION SUBCUTANEOUS at 08:08

## 2021-09-15 RX ADMIN — CHOLESTYRAMINE 8 G: 4 POWDER, FOR SUSPENSION ORAL at 08:08

## 2021-09-15 RX ADMIN — FLUTICASONE FUROATE 1 PUFF: 100 POWDER RESPIRATORY (INHALATION) at 08:09

## 2021-09-15 RX ADMIN — CHOLESTYRAMINE 8 G: 4 POWDER, FOR SUSPENSION ORAL at 14:18

## 2021-09-15 RX ADMIN — Medication 3 MG: at 20:29

## 2021-09-15 RX ADMIN — HEPARIN SODIUM 5000 UNITS: 5000 INJECTION, SOLUTION INTRAVENOUS; SUBCUTANEOUS at 20:29

## 2021-09-15 RX ADMIN — INSULIN ASPART 8 UNITS: 100 INJECTION, SOLUTION INTRAVENOUS; SUBCUTANEOUS at 14:18

## 2021-09-15 RX ADMIN — ESCITALOPRAM OXALATE 5 MG: 5 TABLET, FILM COATED ORAL at 20:29

## 2021-09-15 RX ADMIN — PANTOPRAZOLE SODIUM 40 MG: 40 TABLET, DELAYED RELEASE ORAL at 08:09

## 2021-09-15 RX ADMIN — PANTOPRAZOLE SODIUM 40 MG: 40 TABLET, DELAYED RELEASE ORAL at 17:12

## 2021-09-15 RX ADMIN — PREDNISONE 60 MG: 50 TABLET ORAL at 08:09

## 2021-09-15 RX ADMIN — HEPARIN SODIUM 5000 UNITS: 5000 INJECTION, SOLUTION INTRAVENOUS; SUBCUTANEOUS at 08:08

## 2021-09-15 RX ADMIN — INSULIN ASPART 6 UNITS: 100 INJECTION, SOLUTION INTRAVENOUS; SUBCUTANEOUS at 18:14

## 2021-09-15 RX ADMIN — CHOLESTYRAMINE 8 G: 4 POWDER, FOR SUSPENSION ORAL at 20:29

## 2021-09-15 RX ADMIN — INSULIN ASPART 5 UNITS: 100 INJECTION, SOLUTION INTRAVENOUS; SUBCUTANEOUS at 09:49

## 2021-09-15 RX ADMIN — MYCOPHENOLATE MOFETIL 500 MG: 500 TABLET, FILM COATED ORAL at 17:12

## 2021-09-15 RX ADMIN — ERGOCALCIFEROL 50000 UNITS: 1.25 CAPSULE, LIQUID FILLED ORAL at 14:18

## 2021-09-15 RX ADMIN — CEFEPIME HYDROCHLORIDE 2 G: 2 INJECTION, POWDER, FOR SOLUTION INTRAVENOUS at 05:56

## 2021-09-15 RX ADMIN — MYCOPHENOLATE MOFETIL 500 MG: 500 TABLET, FILM COATED ORAL at 08:09

## 2021-09-15 RX ADMIN — AMLODIPINE BESYLATE 5 MG: 5 TABLET ORAL at 08:11

## 2021-09-15 RX ADMIN — LEVOTHYROXINE SODIUM 25 MCG: 0.03 TABLET ORAL at 08:09

## 2021-09-15 RX ADMIN — AZITHROMYCIN MONOHYDRATE 250 MG: 250 TABLET ORAL at 08:09

## 2021-09-15 RX ADMIN — SULFAMETHOXAZOLE AND TRIMETHOPRIM 1 TABLET: 800; 160 TABLET ORAL at 08:17

## 2021-09-15 ASSESSMENT — ACTIVITIES OF DAILY LIVING (ADL)
ADLS_ACUITY_SCORE: 20
ADLS_ACUITY_SCORE: 19
ADLS_ACUITY_SCORE: 20
ADLS_ACUITY_SCORE: 20

## 2021-09-15 ASSESSMENT — MIFFLIN-ST. JEOR
SCORE: 1391
SCORE: 1416

## 2021-09-15 NOTE — PLAN OF CARE
Neuro: A&Ox4. No new neuro deficits.   Cardiac: SR. HR 70-80s /70s.  Respiratory: Sating >90 on HFNC.50% FIO2. 35LPM. 60-80% FIO2 with activities.    GI/: Adequate urine output. BM x2. Formed, large.   Diet/appetite: Tolerating Regular diet. carbs insulin coverage.   Activity:  Up to chair and commode Ax1  Pain: At acceptable level on current regimen. Denies pain.   Skin: bruising on scrotum, abdomen.   LDA's: R PICC. antibiotic running.     Plan: Continue with POC. Notify primary team with changes.

## 2021-09-15 NOTE — PROGRESS NOTES
[facility]   Pulmonary   Progress Note  Edson Thornton MRN: [id number]  1965  Date of Admission:9/5/2021  Date of Service: 09/15/2021        Assessment & Plan    56-year-old male (BMI 27.6) with history of NSIP/ILD, RA (status post Rituxan infusion, leflunomide), transferred from [facility] for acute on chronic hypoxemic respiratory failure refractory to treatment. He is approved for listing by the transplant evaluation committee pending tapering of his steroids to 20 mg/day or less prior to becoming active on the transplant list.     1. Acute on chronic hypoxemic respiratory failure  2. NSIP-ILD  3. Rheumatoid arthritis (positive anti-CCP, RF)     Discussion:   Patient continues to have stable O2 requirements with slow tapering of his steroids after initiating mycophenolate treatment. Plan is to taper steroids by 10 mg every 3 days while his mycophenolate levels become therapeutic in the next 2 weeks. Will continue to trend CRP to ensure that he is immunosuppressed while his steroid taper is decreasing over the next 2 weeks.      Recommendations    - Prednisone 60 mg starting 9/15 x 3 days, 50 mg x 3 days, 30 mg x 3 days, 20 mg daily  -OK to discontinue antibiotics at this time (has completed 2 week course)  -Trend CRP q48H  -Continue mycophenolate 500 mg BID     We will continue to follow.     Patient seen & discussed w/  Dr. Nicolas.     Faisal Caban MD   Pulmonary/Critical Care Fellow   Pager #[phone]       Attending note:  Patient seen, examined and discussed with Dr. Caban. All data reviewed.  Agree with the assessment and plan as outlined in the above note.  Improving oxygenation.  Is working with PT- strength improving, dyspnea improving.  Continue mycophenolate with planned doubling of dose on 9-18, continue current steroid taper plan; consideration of quickening steroid taper after 2 weeks of mycophenolate.    Yuki Nicolas MD  [phone]      Interval History      RN notes reviewed, no acute  events overnight. Patient reports he feels unchanged compared to yesterday, though overall states his breathing is better. He reports being on less oxygen than last week; states he is participating in physical therapy exercises; notes that his appetite has returned.    No fevers, chills, chest pain, cough, hemoptysis, sore throat, night sweats.    Five-point ROS is negative except for what is noted in the interval history.    Physical Exam Temp:  [97.5  F (36.4  C)-98.5  F (36.9  C)] 98.1  F (36.7  C)  Pulse:  [61-91] 86  Resp:  [18-22] 20  BP: (122-143)/(71-86) 128/76  FiO2 (%):  [50 %] 50 %  SpO2:  [91 %-95 %] 95 %  I/O last 3 completed shifts:  In: 1300 [P.O.:1300]  Out: 2650 [Urine:2650]  Wt Readings from Last 1 Encounters:   09/15/21 65 kg (143 lb 4.8 oz)    Body mass index is 24.6 kg/m . FiO2 (%): 50 %  Resp: 20      Exam:  General:  On HFNC, cooperative, NAD.  HEENT: Anicteric sclera. EOMI.  Lungs: Bibasilar crackles; speaking in full sentences on HFNC 50% O2.   Abd: Soft, NT, ND  Ext: WWP,  No LE edema  Skin: Plethoric appearing face, otherwise no cyanosis, or jaundice  Neuro: AAOx3, no focal deficits    Data   Labs: reviewed in EMR and notable labs listed below.  CBC RESULTS: Recent Labs   Lab Test 09/15/21  0454   WBC 23.3*   RBC 3.42*   HGB 9.8*   HCT 30.2*   MCV 88   MCH 28.7   MCHC 32.5   RDW 17.2*   *           Imaging: reviewed in EMR and notable imaging listed below.    Chest CT high resolution 9/7/21:  IMPRESSION:   1. Extensive bilateral groundglass and reticular opacities are  slightly increased compared to chest CT from 8/30/2021. This likely  represents progression of known NSIP, however superimposed infection  or cannot be excluded.  2. Small bilateral pleural effusions.  3. Stable mediastinal lymphadenopathy, likely reactive.     Medications     amLODIPine  5 mg Oral Daily     azithromycin  250 mg Oral Daily     cholestyramine  2 packet Oral TID     escitalopram  5 mg Oral QPM      fluticasone  1 puff Inhalation Daily     heparin ANTICOAGULANT  5,000 Units Subcutaneous Q12H     insulin aspart   Subcutaneous TID AC     insulin aspart  1-7 Units Subcutaneous TID AC     insulin aspart  1-5 Units Subcutaneous At Bedtime     insulin glargine  5 Units Subcutaneous QAM AC     levothyroxine  25 mcg Oral QAM AC     melatonin  3 mg Oral At Bedtime     mycophenolate  500 mg Oral BID IS     pantoprazole  40 mg Oral BID AC     predniSONE  60 mg Oral Daily     sodium chloride (PF)  3 mL Intracatheter Q8H     sulfamethoxazole-trimethoprim  1 tablet Oral Once per day on Mon Wed Fri     vitamin D2  50,000 Units Oral Q7 Days

## 2021-09-15 NOTE — PLAN OF CARE
Neuro: A/Ox4. Pleasant and cooperative.  Cardiac: SR with HR 60-80. VSS.   Respiratory: O2 sats stable on CPAP @ 50% O2 while sleeping. Sating > 94%. HFNC @ 40-50% FiO2 when awake (60-70% needed with ambulation). Still with MYERS, denies at rest.  GI/: Adequate urine output. Daily Bms.   Diet/appetite: On regular diet, tolerating well.   Activity:  Ax SBA.   Pain: At acceptable level on current regimen. Denies any pain overnight.  Skin: Intact, no new deficits noted. See flowsheets.  LDA's: R triple lumen PICC wdl, infusing atb.     Plan: Continue with POC. Notify primary team with changes.

## 2021-09-15 NOTE — PROGRESS NOTES
Lung Transplant Consult Follow Up Note   September 15, 2021            Assessment and Plan:   Edson Thornton is a 56-year-old male with NSIP/ILD-RA with hypoxic respiratory failure previously treated with leflunomide and Rituxan, more recently with high-dose steroids and broad-spectrum antibiotics.  Some modest improvement in oxygen requirements in the past week but still requiring significant high flow oxygen for adequate oxygen saturation.  The patient has completed a lung transplantation evaluation and appears to be an appropriate candidate for lung transplantation once his prednisone is tapered to 20 mg daily.  Ideally would accelerate steroid taper for more rapid listing but with a rising CRP, will need to continue gradual prednisone taper as planned.  -QuantiFERON gold noted to be indeterminant.  The patient denies any known TB exposure.  He reports a negative PPD, last checked in early 2000's.  He was in the [employer] and travel to [place] and [place] many years ago.  Will review with the transplant ID service to determine if any further evaluation is required for the indeterminant QuantiFERON gold.  -Vitamin D level noted to be low in transplant evaluation, please initiate supplemental vitamin D.  -TPMT noted to be low on transplant evaluation.  We will try to avoid azathioprine but if required will initiate at low dose.  Will defer to general pulmonary consult service and primary hospitalist service regarding duration of antibiotics.  Agree with current CellCept and steroid taper.    I will update transplant selection committee at the weekly meeting tomorrow morning.    Abiodun Woods MD              Interval History:   Gradual improvement in MYERS but still limited to brief activity  Dyspnea at rest: None with high flow O2  Dyspnea on exertion:  With mild exertion, improved from admission  Cough:  intermittent  Sputum: none   Hemoptysis: none   Chest Pain: None           Review of Systems:   C:  NEGATIVE for fever, chills  INTEGUMENTARY/SKIN: no rash or obvious new lesions  ENT/MOUTH: no sore throat, new sinus pain or nasal drainage  RESP: see interval history  CV: NEGATIVE for chest pain, palpitations or peripheral edema  GI: no nausea, vomiting, change in stools  : no dysuria  MUSCULOSKELETAL: no myalgias, arthralgias            Medications:       amLODIPine  5 mg Oral Daily     azithromycin  250 mg Oral Daily     ceFEPIme (MAXIPIME) IV  2 g Intravenous Q8H     cholestyramine  2 packet Oral TID     escitalopram  5 mg Oral QPM     fluconazole  400 mg Oral Daily     fluticasone  1 puff Inhalation Daily     heparin ANTICOAGULANT  5,000 Units Subcutaneous Q12H     insulin aspart   Subcutaneous TID AC     insulin aspart  1-7 Units Subcutaneous TID AC     insulin aspart  1-5 Units Subcutaneous At Bedtime     insulin glargine  5 Units Subcutaneous QAM AC     levothyroxine  25 mcg Oral QAM AC     melatonin  3 mg Oral At Bedtime     mycophenolate  500 mg Oral BID IS     pantoprazole  40 mg Oral BID AC     predniSONE  60 mg Oral Daily     sodium chloride (PF)  3 mL Intracatheter Q8H     sulfamethoxazole-trimethoprim  1 tablet Oral Once per day on Mon Wed Fri     acetaminophen, benzocaine 20%, bisacodyl, artificial tears ophthalmic solution, glucose **OR** dextrose **OR** glucagon, fentaNYL, HOLD MEDICATION, ipratropium - albuterol 0.5 mg/2.5 mg/3 mL **OR** ipratropium - albuterol 0.5 mg/2.5 mg/3 mL, lidocaine 4%, lidocaine (buffered or not buffered), melatonin, midazolam, - MEDICATION INSTRUCTIONS -, ondansetron **OR** ondansetron, oxyCODONE **OR** oxyCODONE, - MEDICATION INSTRUCTIONS -, polyethylene glycol, sodium chloride (PF)         Physical Exam:   Temp:  [97.5  F (36.4  C)-98.4  F (36.9  C)] 97.8  F (36.6  C)  Pulse:  [61-91] 68  Resp:  [18-22] 18  BP: (122-143)/(74-86) 122/74  FiO2 (%):  [45 %-55 %] 50 %  SpO2:  [91 %-97 %] 91 %    Intake/Output Summary (Last 24 hours) at 9/15/2021 0803  Last data filed at  9/15/2021 0400  Gross per 24 hour   Intake 1500 ml   Output 2650 ml   Net -1150 ml     Constitutional:   Awake, alert and in no apparent distress     Eyes:   Nonicteric, PERRL     ENT:    oral mucosa moist without lesions     Neck:   Supple without supraclavicular or cervical lymphadenopathy     Lungs:   Mildly diminished air flow.  Minimal right crackles. No rhonchi.  No wheezes.     Cardiovascular:   Normal S1 and S2.  RRR.  No murmur. No gallop. No rub.     Abdomen:   NABS, soft, nondistended, nontender.  No HSM.     Musculoskeletal:   No edema     Neurologic:   Alert and conversant.     Skin:   Warm, dry.  No rash on limited exam.             Data:   All laboratory and imaging data reviewed.    Results for orders placed or performed during the hospital encounter of 09/05/21 (from the past 24 hour(s))   Glucose by meter   Result Value Ref Range    GLUCOSE BY METER POCT 196 (H) 70 - 99 mg/dL   Glucose by meter   Result Value Ref Range    GLUCOSE BY METER POCT 168 (H) 70 - 99 mg/dL   Glucose by meter   Result Value Ref Range    GLUCOSE BY METER POCT 149 (H) 70 - 99 mg/dL   Glucose by meter   Result Value Ref Range    GLUCOSE BY METER POCT 168 (H) 70 - 99 mg/dL   Basic metabolic panel   Result Value Ref Range    Sodium 138 133 - 144 mmol/L    Potassium 3.5 3.4 - 5.3 mmol/L    Chloride 104 94 - 109 mmol/L    Carbon Dioxide (CO2) 26 20 - 32 mmol/L    Anion Gap 8 3 - 14 mmol/L    Urea Nitrogen 13 7 - 30 mg/dL    Creatinine 0.62 (L) 0.66 - 1.25 mg/dL    Calcium 8.5 8.5 - 10.1 mg/dL    Glucose 136 (H) 70 - 99 mg/dL    GFR Estimate >90 >60 mL/min/1.73m2   CBC with platelets   Result Value Ref Range    WBC Count 23.3 (H) 4.0 - 11.0 10e3/uL    RBC Count 3.42 (L) 4.40 - 5.90 10e6/uL    Hemoglobin 9.8 (L) 13.3 - 17.7 g/dL    Hematocrit 30.2 (L) 40.0 - 53.0 %    MCV 88 78 - 100 fL    MCH 28.7 26.5 - 33.0 pg    MCHC 32.5 31.5 - 36.5 g/dL    RDW 17.2 (H) 10.0 - 15.0 %    Platelet Count 148 (L) 150 - 450 10e3/uL   CRP  inflammation   Result Value Ref Range    CRP Inflammation 9.9 (H) 0.0 - 8.0 mg/L   Lactic Acid STAT   Result Value Ref Range    Lactic Acid 1.7 0.7 - 2.0 mmol/L          98

## 2021-09-15 NOTE — PLAN OF CARE
"Care for from 2190-4195:      BP (!) 140/82 (BP Location: Left arm)   Pulse 71   Temp 98.4  F (36.9  C) (Oral)   Resp 18   Ht 1.626 m (5' 4\")   Wt 65 kg (143 lb 4.8 oz)   SpO2 96%   BMI 24.60 kg/m      Neuro: A&Ox4. Calls as needed.  Cardiac: SR. VSS.       Respiratory: Sating 88%>on 40-50 % FIO2 @ 35 LPM.  With actvity 60% FIO2 with 35 LPM.  At night on BiPap at 50-60 % FIO2.  GI/: Adequate urine output into urinal. BM X1 into commode.  Diet/appetite: Tolerating regular diet. Eating well. ACHS BG.  Activity:  Assist of 1 up to chair and in halls.  Pain: At acceptable level on current regimen.   Skin: No new deficits noted.  LDA's:PICC,      Plan: Continue with POC. Notify primary team with changes.           "

## 2021-09-16 ENCOUNTER — APPOINTMENT (OUTPATIENT)
Dept: PHYSICAL THERAPY | Facility: CLINIC | Age: 56
End: 2021-09-16
Attending: STUDENT IN AN ORGANIZED HEALTH CARE EDUCATION/TRAINING PROGRAM
Payer: COMMERCIAL

## 2021-09-16 ENCOUNTER — APPOINTMENT (OUTPATIENT)
Dept: CT IMAGING | Facility: CLINIC | Age: 56
End: 2021-09-16
Attending: INTERNAL MEDICINE
Payer: COMMERCIAL

## 2021-09-16 DIAGNOSIS — Z77.21 EXPOSURE TO BLOOD OR BODY FLUID: Primary | ICD-10-CM

## 2021-09-16 LAB
ALBUMIN UR-MCNC: NEGATIVE MG/DL
ANION GAP SERPL CALCULATED.3IONS-SCNC: 6 MMOL/L (ref 3–14)
APPEARANCE UR: CLEAR
BACTERIA #/AREA URNS HPF: ABNORMAL /HPF
BASE EXCESS BLDV CALC-SCNC: -0.3 MMOL/L (ref -7.7–1.9)
BASOPHILS # BLD MANUAL: 0 10E3/UL (ref 0–0.2)
BASOPHILS NFR BLD MANUAL: 0 %
BILIRUB UR QL STRIP: NEGATIVE
BUN SERPL-MCNC: 14 MG/DL (ref 7–30)
CALCIUM SERPL-MCNC: 8.7 MG/DL (ref 8.5–10.1)
CHLORIDE BLD-SCNC: 107 MMOL/L (ref 94–109)
CO2 SERPL-SCNC: 25 MMOL/L (ref 20–32)
COLOR UR AUTO: ABNORMAL
CREAT SERPL-MCNC: 0.61 MG/DL (ref 0.66–1.25)
CRP SERPL-MCNC: 6.7 MG/L (ref 0–8)
EOSINOPHIL # BLD MANUAL: 0.6 10E3/UL (ref 0–0.7)
EOSINOPHIL NFR BLD MANUAL: 3 %
ERYTHROCYTE [DISTWIDTH] IN BLOOD BY AUTOMATED COUNT: 17.6 % (ref 10–15)
GFR SERPL CREATININE-BSD FRML MDRD: >90 ML/MIN/1.73M2
GLUCOSE BLD-MCNC: 95 MG/DL (ref 70–99)
GLUCOSE BLDC GLUCOMTR-MCNC: 104 MG/DL (ref 70–99)
GLUCOSE BLDC GLUCOMTR-MCNC: 167 MG/DL (ref 70–99)
GLUCOSE BLDC GLUCOMTR-MCNC: 221 MG/DL (ref 70–99)
GLUCOSE BLDC GLUCOMTR-MCNC: 230 MG/DL (ref 70–99)
GLUCOSE BLDC GLUCOMTR-MCNC: 93 MG/DL (ref 70–99)
GLUCOSE UR STRIP-MCNC: NEGATIVE MG/DL
HCO3 BLDV-SCNC: 25 MMOL/L (ref 21–28)
HCT VFR BLD AUTO: 31.2 % (ref 40–53)
HGB BLD-MCNC: 9.9 G/DL (ref 13.3–17.7)
HGB UR QL STRIP: NEGATIVE
HIV 1+2 AB+HIV1P24 AG SERPLBLD IA.RAPID: NON REACTIVE
HIV 1+2 AB+HIV1P24 AG SERPLBLD IA.RAPID: NON REACTIVE
HIV INTERPRETATION: NORMAL
KETONES UR STRIP-MCNC: NEGATIVE MG/DL
LACTATE SERPL-SCNC: 4.1 MMOL/L (ref 0.7–2)
LACTATE SERPL-SCNC: 4.4 MMOL/L (ref 0.7–2)
LEUKOCYTE ESTERASE UR QL STRIP: NEGATIVE
LYMPHOCYTES # BLD MANUAL: 1.9 10E3/UL (ref 0.8–5.3)
LYMPHOCYTES NFR BLD MANUAL: 9 %
MCH RBC QN AUTO: 28.7 PG (ref 26.5–33)
MCHC RBC AUTO-ENTMCNC: 31.7 G/DL (ref 31.5–36.5)
MCV RBC AUTO: 90 FL (ref 78–100)
MONOCYTES # BLD MANUAL: 0.2 10E3/UL (ref 0–1.3)
MONOCYTES NFR BLD MANUAL: 1 %
MUCOUS THREADS #/AREA URNS LPF: PRESENT /LPF
MYELOCYTES # BLD MANUAL: 1.3 10E3/UL
MYELOCYTES NFR BLD MANUAL: 6 %
NEUTROPHILS # BLD MANUAL: 17.5 10E3/UL (ref 1.6–8.3)
NEUTROPHILS NFR BLD MANUAL: 81 %
NITRATE UR QL: NEGATIVE
NT-PROBNP SERPL-MCNC: 57 PG/ML (ref 0–900)
O2/TOTAL GAS SETTING VFR VENT: 50 %
PCO2 BLDV: 41 MM HG (ref 40–50)
PH BLDV: 7.39 [PH] (ref 7.32–7.43)
PH UR STRIP: 5.5 [PH] (ref 5–7)
PLAT MORPH BLD: ABNORMAL
PLATELET # BLD AUTO: 120 10E3/UL (ref 150–450)
PO2 BLDV: 36 MM HG (ref 25–47)
POTASSIUM BLD-SCNC: 3.5 MMOL/L (ref 3.4–5.3)
PROCALCITONIN SERPL-MCNC: <0.05 NG/ML
RBC # BLD AUTO: 3.45 10E6/UL (ref 4.4–5.9)
RBC MORPH BLD: ABNORMAL
RBC URINE: 0 /HPF
SODIUM SERPL-SCNC: 138 MMOL/L (ref 133–144)
SP GR UR STRIP: 1.01 (ref 1–1.03)
TRANSITIONAL EPI: <1 /HPF
UROBILINOGEN UR STRIP-MCNC: NORMAL MG/DL
WBC # BLD AUTO: 21.6 10E3/UL (ref 4–11)
WBC URINE: <1 /HPF

## 2021-09-16 PROCEDURE — 87449 NOS EACH ORGANISM AG IA: CPT | Performed by: INTERNAL MEDICINE

## 2021-09-16 PROCEDURE — 99233 SBSQ HOSP IP/OBS HIGH 50: CPT | Performed by: INTERNAL MEDICINE

## 2021-09-16 PROCEDURE — 250N000012 HC RX MED GY IP 250 OP 636 PS 637: Performed by: INTERNAL MEDICINE

## 2021-09-16 PROCEDURE — 36592 COLLECT BLOOD FROM PICC: CPT | Performed by: INTERNAL MEDICINE

## 2021-09-16 PROCEDURE — 250N000013 HC RX MED GY IP 250 OP 250 PS 637: Performed by: INTERNAL MEDICINE

## 2021-09-16 PROCEDURE — 94660 CPAP INITIATION&MGMT: CPT

## 2021-09-16 PROCEDURE — 87040 BLOOD CULTURE FOR BACTERIA: CPT | Performed by: PHYSICIAN ASSISTANT

## 2021-09-16 PROCEDURE — 83880 ASSAY OF NATRIURETIC PEPTIDE: CPT | Performed by: INTERNAL MEDICINE

## 2021-09-16 PROCEDURE — G0463 HOSPITAL OUTPT CLINIC VISIT: HCPCS

## 2021-09-16 PROCEDURE — 83605 ASSAY OF LACTIC ACID: CPT | Performed by: INTERNAL MEDICINE

## 2021-09-16 PROCEDURE — 83605 ASSAY OF LACTIC ACID: CPT | Performed by: PHYSICIAN ASSISTANT

## 2021-09-16 PROCEDURE — 999N000185 HC STATISTIC TRANSPORT TIME EA 15 MIN

## 2021-09-16 PROCEDURE — 250N000013 HC RX MED GY IP 250 OP 250 PS 637: Performed by: PHYSICIAN ASSISTANT

## 2021-09-16 PROCEDURE — 250N000013 HC RX MED GY IP 250 OP 250 PS 637: Performed by: STUDENT IN AN ORGANIZED HEALTH CARE EDUCATION/TRAINING PROGRAM

## 2021-09-16 PROCEDURE — 99254 IP/OBS CNSLTJ NEW/EST MOD 60: CPT | Performed by: INTERNAL MEDICINE

## 2021-09-16 PROCEDURE — 36415 COLL VENOUS BLD VENIPUNCTURE: CPT | Performed by: PHYSICIAN ASSISTANT

## 2021-09-16 PROCEDURE — 84145 PROCALCITONIN (PCT): CPT | Performed by: PHYSICIAN ASSISTANT

## 2021-09-16 PROCEDURE — 258N000003 HC RX IP 258 OP 636: Performed by: PHYSICIAN ASSISTANT

## 2021-09-16 PROCEDURE — 250N000011 HC RX IP 250 OP 636: Performed by: PHYSICIAN ASSISTANT

## 2021-09-16 PROCEDURE — 81001 URINALYSIS AUTO W/SCOPE: CPT | Performed by: PHYSICIAN ASSISTANT

## 2021-09-16 PROCEDURE — 82803 BLOOD GASES ANY COMBINATION: CPT | Performed by: PHYSICIAN ASSISTANT

## 2021-09-16 PROCEDURE — 36592 COLLECT BLOOD FROM PICC: CPT | Performed by: PHYSICIAN ASSISTANT

## 2021-09-16 PROCEDURE — 80048 BASIC METABOLIC PNL TOTAL CA: CPT | Performed by: INTERNAL MEDICINE

## 2021-09-16 PROCEDURE — 70486 CT MAXILLOFACIAL W/O DYE: CPT | Mod: 26 | Performed by: RADIOLOGY

## 2021-09-16 PROCEDURE — 70486 CT MAXILLOFACIAL W/O DYE: CPT

## 2021-09-16 PROCEDURE — 120N000003 HC R&B IMCU UMMC

## 2021-09-16 PROCEDURE — 85027 COMPLETE CBC AUTOMATED: CPT | Performed by: INTERNAL MEDICINE

## 2021-09-16 PROCEDURE — 97110 THERAPEUTIC EXERCISES: CPT | Mod: GP | Performed by: PHYSICAL THERAPIST

## 2021-09-16 PROCEDURE — 999N000157 HC STATISTIC RCP TIME EA 10 MIN

## 2021-09-16 PROCEDURE — 86140 C-REACTIVE PROTEIN: CPT | Performed by: PHYSICIAN ASSISTANT

## 2021-09-16 RX ORDER — PREDNISONE 50 MG/1
50 TABLET ORAL DAILY
Status: COMPLETED | OUTPATIENT
Start: 2021-09-18 | End: 2021-09-20

## 2021-09-16 RX ORDER — PREDNISONE 20 MG/1
40 TABLET ORAL DAILY
Status: COMPLETED | OUTPATIENT
Start: 2021-09-21 | End: 2021-09-23

## 2021-09-16 RX ORDER — FUROSEMIDE 20 MG
20 TABLET ORAL DAILY
Status: DISCONTINUED | OUTPATIENT
Start: 2021-09-16 | End: 2021-09-16

## 2021-09-16 RX ORDER — LIDOCAINE 40 MG/G
CREAM TOPICAL
Status: DISCONTINUED | OUTPATIENT
Start: 2021-09-16 | End: 2021-10-13

## 2021-09-16 RX ORDER — PREDNISONE 20 MG/1
20 TABLET ORAL DAILY
Status: DISCONTINUED | OUTPATIENT
Start: 2021-09-27 | End: 2021-10-12

## 2021-09-16 RX ADMIN — Medication 5 MG: at 20:23

## 2021-09-16 RX ADMIN — AMLODIPINE BESYLATE 5 MG: 5 TABLET ORAL at 08:36

## 2021-09-16 RX ADMIN — INSULIN ASPART 7 UNITS: 100 INJECTION, SOLUTION INTRAVENOUS; SUBCUTANEOUS at 10:08

## 2021-09-16 RX ADMIN — FLUTICASONE FUROATE 1 PUFF: 100 POWDER RESPIRATORY (INHALATION) at 08:35

## 2021-09-16 RX ADMIN — INSULIN ASPART 9 UNITS: 100 INJECTION, SOLUTION INTRAVENOUS; SUBCUTANEOUS at 13:33

## 2021-09-16 RX ADMIN — HEPARIN SODIUM 5000 UNITS: 5000 INJECTION, SOLUTION INTRAVENOUS; SUBCUTANEOUS at 20:23

## 2021-09-16 RX ADMIN — MYCOPHENOLATE MOFETIL 500 MG: 500 TABLET, FILM COATED ORAL at 17:57

## 2021-09-16 RX ADMIN — MYCOPHENOLATE MOFETIL 500 MG: 500 TABLET, FILM COATED ORAL at 08:36

## 2021-09-16 RX ADMIN — CHOLESTYRAMINE 8 G: 4 POWDER, FOR SUSPENSION ORAL at 08:35

## 2021-09-16 RX ADMIN — INSULIN ASPART 5 UNITS: 100 INJECTION, SOLUTION INTRAVENOUS; SUBCUTANEOUS at 17:51

## 2021-09-16 RX ADMIN — LEVOTHYROXINE SODIUM 25 MCG: 0.03 TABLET ORAL at 08:36

## 2021-09-16 RX ADMIN — PREDNISONE 60 MG: 50 TABLET ORAL at 08:35

## 2021-09-16 RX ADMIN — SODIUM CHLORIDE, POTASSIUM CHLORIDE, SODIUM LACTATE AND CALCIUM CHLORIDE 500 ML: 600; 310; 30; 20 INJECTION, SOLUTION INTRAVENOUS at 17:52

## 2021-09-16 RX ADMIN — AZITHROMYCIN MONOHYDRATE 250 MG: 250 TABLET ORAL at 08:36

## 2021-09-16 RX ADMIN — PANTOPRAZOLE SODIUM 40 MG: 40 TABLET, DELAYED RELEASE ORAL at 08:35

## 2021-09-16 RX ADMIN — CHOLESTYRAMINE 8 G: 4 POWDER, FOR SUSPENSION ORAL at 15:49

## 2021-09-16 RX ADMIN — PANTOPRAZOLE SODIUM 40 MG: 40 TABLET, DELAYED RELEASE ORAL at 15:49

## 2021-09-16 RX ADMIN — CHOLESTYRAMINE 8 G: 4 POWDER, FOR SUSPENSION ORAL at 20:23

## 2021-09-16 RX ADMIN — ESCITALOPRAM OXALATE 5 MG: 5 TABLET, FILM COATED ORAL at 20:23

## 2021-09-16 RX ADMIN — HEPARIN SODIUM 5000 UNITS: 5000 INJECTION, SOLUTION INTRAVENOUS; SUBCUTANEOUS at 08:36

## 2021-09-16 RX ADMIN — FUROSEMIDE 20 MG: 20 TABLET ORAL at 13:33

## 2021-09-16 ASSESSMENT — ACTIVITIES OF DAILY LIVING (ADL)
ADLS_ACUITY_SCORE: 11
ADLS_ACUITY_SCORE: 11
ADLS_ACUITY_SCORE: 17
ADLS_ACUITY_SCORE: 11
ADLS_ACUITY_SCORE: 17
ADLS_ACUITY_SCORE: 17

## 2021-09-16 ASSESSMENT — MIFFLIN-ST. JEOR: SCORE: 1422

## 2021-09-16 NOTE — CONSULTS
[facility]    Transplant Infectious Diseases Inpatient Consultation      Edson Thornton MRN# [id number]   YOB: 1965 Age: 56 year old   Date of Admission and time: 9/5/2021  4:42 PM     Reason for consult: I was asked by Dr. Sears to evaluate this patient for indeterminate QuantiFERON.             Recommendations:   1. No ID-related issues that would keep the patient from proceeding with transplantation.   2. I would give a single dose of ivermectin 200 mcg/kg x1.   3. Dental evaluation.   4. Please send urinary Coccidioides Ag now (orderd for you) then monthly for 12 months after the transplant.  5. When transplanted please send, in addition to the lungs, a mediastinal LN for pathology, fungal cx and AFB smear and cx.   6. The patient will likely need to follow up with ID as an outpatient within 2 months of lung transplant to follow up on the urinary Cocci Ag and the LN and lungs biopsies.     ID will sign off, please call for questions or if the Coccidioides Ag is positive.         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 Currently on MMF, high dose steroids, and received two doses of rituximab, last dose 9/3/2021.   With indeterminate QuantiFERON test.         Active Problems and Infectious Diseases Issues:   1. Indeterminate QuantiFERON test.   2. Pre-lung transplant evaluation.     The patient had been in the [employer] and was stationed in many states including [facility] and [facility]. Also was stationed in [place] and [place] and maybe [place].   The patient was tested with tuberculin skin test on numerous occasions before he became immunocompromised and was never positive. In addition, he was tested with QuantIFERON on 5/11/2021 prior to, or within 24 hr of receiving 10 mg/day of prednisone, the QuantiFERON was negative.   All the above suggest that the patient does not have LTBI and would not require LTBI therapy.    The indeterminate QuantiFERON test is due to mitogen minus nill being at very low level indicating immunosuppressed state that would make interpreting the negative TB1 and TB2 tests not possible.     The granulomas on prior CT in OSH likely represent old fungal infection. The fact that these granulomas are calcified indicate old, non-viable and not active infection with no need for further diagnostic or therapeutic intervention. This is likely related to the patient's history of residing in a Coccidioides endemic areas ([place]) and/or Histoplasma endemic area ([place]). The reactivation of Histoplasma is unlikely. The reactivation of Coccidioides is possible but I would screen for it using monthly urinary Coccidioides Ag and sending the lungs and the mediastinal LN for pathology, fungal cx and AFB smear and cx. The negative fungal serology was sent after the prednisone was increased from 10 mg/day to 40 mg/day of prednisone.     Given the extensive travel as a  within the [place] and residing in countries endemic with Strongyloides, I would treat the patient empirically with ivermectin x1.           Old Problems and Infectious Diseases Issues:   1. Pneumonia [place] with [place] course of AB in [place]     Other Infectious Disease issues include:  - PCP prophylaxis: appropriately on bactrim.    - Serostatus: CMV -, EBV +, HSV1+/2+, VZV +, Toxo R-, depending on the donor status will need the appropriate CMV ppx.   - Immunization status: did not receive the flu, the pneumonia vaccines or the COVID-19 vaccine. Now the patient is immunocompromised and will not benefit from vaccinations while on high dose steroids and specifically after receiving two doses of rituximab.   - Gamma globulin status: 363 as of 9/7/2021.       Thank you very much Dr. Sears for involving me in the care of Mr. Edson Thornton. Please do not hesitate to call me for any question.     Attestation:  I interviewed the patient and obtained history from  the patient, and by reviewing the patient's chart including outside records, microbiological data, and radiological data. All data are summarized in this notes.  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   In 4/2021 he presented with weeks and months of polyarthritis treated symptomatically with negative RA tests.   In 5/2021 repeat testing was suggestive of RA. On 5/11/2021 he underwent QuantiFERON test and on the same day he was started on sulfasalazine, prednisone 10 mg daily and medrol. The QuantiFERON test was negative.   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   The prednisone was increased to 40 mg daily in 6/2021and in late July he was admitted to the hospital with respiratory distress and clinical and radiological progressive disease. He was continued on high dose steroids, and was given rituximab x2 doses on 8/6/2021 and 9/3/2021.   He underwent bronchoscopy which was reported to be negative except Candid for which he was given fluconazole.   He was transferred to [facility] for lung transplant evaluation.   He was given ABx for possible pneumonia which were discontinued 9/15/21.     QuantiFERON was rechecked and was indeterminate. ID consulted.     Exposure History:  Was born in [place], lived in many states including [facility] and [facility]. Currently lives in [place] in a house with his girlfriend/fiancee and her son who's fully vaccinated. No pets. He used to work as a  all over the country.   He used to be in the [employer] and was stationed in [employer] and [employer]. He also lived in [place].   He was tested for TB by [facility] on many occasions and was negative.   No significant outdoors exposure.   Institutionalized for ETOH  use in the past.              Review of Systems:      As mentioned in the HPI otherwise negative by reviewing constitutional symptoms, central and peripheral neurological systems, respiratory system, cardiac system, GI system,  system, musculoskeletal, skin, allergy, and lymphatics.                  Past Medical History:     Past Medical History:   Diagnosis Date     Anxiety      Depression      HLD (hyperlipidemia)      HTN (hypertension)      Hypothyroidism      ILD (interstitial lung disease) (H)      SALTY on CPAP      Oxygen dependent     BL 4L since ~6/2021     Rheumatoid arthritis (H)     signs ~5/2020, dx 5/2021     Steroid-induced hyperglycemia      Traction bronchiectasis (H)             Past Surgical History:     Past Surgical History:   Procedure Laterality Date     COLONOSCOPY W/ BIOPSIES AND POLYPECTOMY  07/21/2020     CV CORONARY ANGIOGRAM N/A 9/8/2021    Procedure: Coronary Angiogram with possible intervention;  Surgeon: Jovon Bullock MD;  Location:  HEART CARDIAC CATH LAB     CV RIGHT HEART CATH MEASUREMENTS RECORDED N/A 9/8/2021    Procedure: Right Heart Cath;  Surgeon: Jovon Bullock MD;  Location:  HEART CARDIAC CATH LAB     HERNIA REPAIR       right acl       XR ACROMIOCLAVICULAR JOINT BILATERAL              Social History:     Social History     Tobacco Use     Smoking status: Never Smoker     Smokeless tobacco: Never Used   Substance Use Topics     Alcohol use: Never            Family History:   I have reviewed this patient's family history  Family History   Problem Relation Age of Onset     Diabetes Type 1 Mother      Heart Disease Mother      Chronic Obstructive Pulmonary Disease Mother      Rheumatoid Arthritis Father      Emphysema Paternal Grandfather             Immunizations:     TDAP 02/15/2018     Zoster Recombinant (Shingrix)              Allergies:   No Known Allergies          Medications:   Medications that Require Transfusion:     - MEDICATION INSTRUCTIONS -        - MEDICATION INSTRUCTIONS -       Scheduled Medications:     amLODIPine  5 mg Oral Daily     azithromycin  250 mg Oral Daily     cholestyramine  2 packet Oral TID     escitalopram  5 mg Oral QPM     fluticasone  1 puff Inhalation Daily     furosemide  20 mg Oral Daily     heparin ANTICOAGULANT  5,000 Units Subcutaneous Q12H     insulin aspart   Subcutaneous TID AC     insulin aspart  1-7 Units Subcutaneous TID AC     insulin aspart  1-5 Units Subcutaneous At Bedtime     insulin glargine  8 Units Subcutaneous QAM AC     levothyroxine  25 mcg Oral QAM AC     melatonin  5 mg Oral At Bedtime     mycophenolate  500 mg Oral BID IS     pantoprazole  40 mg Oral BID AC     [START ON 9/17/2021] predniSONE  60 mg Oral Daily    Followed by     [START ON 9/18/2021] predniSONE  50 mg Oral Daily    Followed by     [START ON 9/21/2021] predniSONE  40 mg Oral Daily    Followed by     [START ON 9/24/2021] predniSONE  30 mg Oral Daily    Followed by     [START ON 9/27/2021] predniSONE  20 mg Oral Daily     sodium chloride (PF)  3 mL Intracatheter Q8H     sulfamethoxazole-trimethoprim  1 tablet Oral Once per day on Mon Wed Fri     vitamin D2  50,000 Units Oral Q7 Days               Physical Exam:   Temp: 98.1  F (36.7  C) Temp src: Oral BP: 125/83 Pulse: 104   Resp: 18 SpO2: 92 % O2 Device: High Flow Nasal Cannula (HFNC) Oxygen Delivery: 30 LPM    Wt Readings from Last 4 Encounters:   09/16/21 68.1 kg (150 lb 2.1 oz)     Constitutional: awake, alert, cooperative, no apparent distress and appears at stated age, well nourished.   Head, ENT, Eyes, and Neck: Normocephalic, sinuses non-tender to palpation, external ears without lesions, moist buccal mucosa without oral thrush or ulcers, tonsils without swelling, erythema, or exudate, no tenderness palpating teeth, good dentition, gums without necrosis or abscesses.   PERRL, EOMI, pink conjunctivae, non-icteric sclera.   Neck supple without rigidity, no cervical/axillary/inguinal LA  bilaterally.    Neurologic: Patient is moving all extremities without focal deficit, no focal sensory loss.   Lungs: CTA bilaterally, no accessory muscle use, no dullness to percussion and no abnormal tactile fremitus.   CVS: RRR, normal S1/S2, no murmur, PMI was not displaced.   Abdomen: non-tender, non-distended, no masses, no bruit, no shifting dullness, normal BS.   Extremities: no pitting edema of bilateral lower extremities, no ulcers, normal ROM of all joints, no swelling or erythema of any of joints and no tenderness to palpation.   Skin: no induration, fluctuation or discharge at the PICC line in the RUE, and no rash            Data:   No results found for: ACD4    Inflammatory Markers    Recent Labs   Lab Test 09/15/21  0454 09/13/21  0449 09/11/21  0448 09/09/21  0536 09/07/21  1324   CRP 9.9* <2.9 <2.9 <2.9 <2.9       Immune Globulin Studies     Recent Labs   Lab Test 09/07/21  1324 09/07/21  1100   *  363*  --    IGM 51  --    IGE  --  83     --        Metabolic Studies       Recent Labs   Lab Test 09/16/21  1549 09/16/21  1207 09/16/21  0825 09/16/21  0645 09/15/21  2236 09/15/21  1631 09/15/21  0801 09/15/21  0454 09/14/21  0754 09/14/21  0439 09/13/21  0723 09/13/21  0449 09/12/21  0813 09/12/21  0615 09/11/21  0755 09/11/21  0448 09/07/21  2057 09/07/21  1324 09/07/21  1313 09/07/21  1100 09/07/21  0928 09/06/21  0755 09/06/21  0633 09/06/21  0633   NA  --   --   --  138  --   --   --  138  --  136  --  136  --  136  --  134   < > 138   < > 139  --   --    < > 139   POTASSIUM  --   --   --  3.5  --   --   --  3.5  --  3.6  --  3.3*  --  3.9  --  4.1   < > 3.9   < > 3.9  --   --    < > 4.1   CHLORIDE  --   --   --  107  --   --   --  104  --  104  --  106  --  105  --  103   < > 107   < > 108  --   --    < > 108   CO2  --   --   --  25  --   --   --  26  --  25  --  29  --  24  --  24   < > 25   < > 24  --   --    < > 26   ANIONGAP  --   --   --  6  --   --   --  8  --  7  --  1*  --   7  --  7   < > 6   < > 7  --   --    < > 5   BUN  --   --   --  14  --   --   --  13  --  14  --  13  --  17  --  20   < > 23   < > 22  --   --    < > 19   CR  --   --   --  0.61*  --   --   --  0.62*  --  0.61*  --  0.54*  --  0.57*  --  0.56*   < > 0.69   < > 0.73  --   --    < > 0.79   GFRESTIMATED  --   --   --  >90  --   --   --  >90  --  >90  --  >90  --  >90  --  >90   < > >90   < > >90  --   --    < > >90   * 230* 93 95 111* 167*   < > 136*   < > 110*   < > 148*   < > 224*   < > 274*   < > 151*   < > 158*  --    < >   < > 136*   A1C  --   --   --   --   --   --   --   --   --   --   --   --   --   --   --   --   --   --   --   --  6.6*  --   --   --    ERIK  --   --   --  8.7  --   --   --  8.5  --  8.6  --  8.1*  --  8.5  --  8.2*   < > 8.8   < > 9.1  --   --    < > 8.6   PHOS  --   --   --   --   --   --   --   --   --   --   --   --   --   --   --   --   --   --   --  3.2  --   --   --  3.6   MAG  --   --   --   --   --   --   --   --   --   --   --   --   --   --   --   --   --   --   --  2.2  --   --   --  2.5*   LACT  --   --   --   --   --   --   --  1.7  --  1.3   < > 1.5   < >  --    < >  --    < >  --   --   --   --    < >  --   --    CKT  --   --   --   --   --   --   --   --   --   --   --   --   --   --   --   --   --  41  --   --   --   --   --   --     < > = values in this interval not displayed.       Hepatic Studies    Recent Labs   Lab Test 09/07/21  1324   BILITOTAL 0.2   ALKPHOS 67   ALBUMIN 2.4*   AST 30   ALT 39       Pancreatitis testing    Recent Labs   Lab Test 09/07/21  1324 09/07/21  1100   AMYLASE  --  32   TRIG 364*  --        Hematology Studies      Recent Labs   Lab Test 09/16/21  0645 09/15/21  0454 09/14/21  0439 09/13/21  0449 09/12/21  0615 09/12/21  0615 09/11/21  0448 09/11/21  0448   WBC 21.6* 23.3* 22.5* 27.1*  --  23.9*  --  22.8*   ANEU 17.5*  --   --   --   --   --   --   --    ALYM 1.9  --   --   --   --   --   --   --    DONALD 0.2  --   --   --   --   --   --    --    AEOS 0.6  --   --   --   --   --   --   --    HGB 9.9* 9.8* 10.1* 9.6*  --  9.4*  --  9.3*   HCT 31.2* 30.2* 32.8* 29.9*  --  29.8*  --  29.3*   * 148* 108* 138*   < > 129*   < > 123*    < > = values in this interval not displayed.       Clotting Studies    Recent Labs   Lab Test 09/08/21  0626 09/07/21  1324   INR 1.19* 1.16*   PTT  --  26       Arterial Blood Gas Testing    Recent Labs   Lab Test 09/05/21  1901 09/05/21  1851   PH  --  7.48*   PCO2  --  31*   PO2  --  85   HCO3  --  23   O2PER 60 60        Urine Studies     Recent Labs   Lab Test 09/07/21  1402   URINEPH 6.0   NITRITE Negative   LEUKEST Negative   WBCU 1       Vancomycin Levels     No lab results found.    Invalid input(s): VANCO    Tobramycin levels     No lab results found.    Gentamicin levels    No lab results found.    Tacrolimus levels    Invalid input(s): TACROLIMUS, TAC, TACR  Transplant Immunosuppression Labs Latest Ref Rng & Units 9/16/2021 9/15/2021 9/14/2021 9/13/2021 9/12/2021   Creat 0.66 - 1.25 mg/dL 0.61(L) 0.62(L) 0.61(L) 0.54(L) 0.57(L)   BUN 7 - 30 mg/dL 14 13 14 13 17   WBC 4.0 - 11.0 10e3/uL 21.6(H) 23.3(H) 22.5(H) 27.1(H) 23.9(H)   Neutrophil % 81 - - - -   ANEU 1.6 - 8.3 10e3/uL 17.5(H) - - - -       Cyclosporine levels    Invalid input(s): CYCLOSPORINE, CYC    Mycophenolate levels    Invalid input(s): MYPA, MYP    Sirolimus levels    Invalid input(s): SIROLIMUS, SIR, RAPA    CSF testing   No lab results found.      Microbiology:  BAL OSH positive for Candida.   Last check of C difficile  No results found for: CDBPCT    Virology:  CMV viral loads  No results found for: CMVQNT, CMVRESINST, 50962, 21840, 86218, 83659, CMVQAL  CMV viral loads  No lab results found.    CMV viral loads  No results found for: CMVQNT, CMVRESINST, CMVLOG, 11522, 96317, 73881, 50468    CMV resistance testing  No lab results found.  No results found for: CMVCID, CMVFOS, CMVGAN     No results found for: H6RES    No results found for:  EBVDN, EBRES, EBVDN, EBVSP, EBVPC, EBVPCR    CMV Antibody IgG   Date Value Ref Range Status   09/07/2021 No detectable antibody. No detectable antibody.  Final       Toxoplasma Antibody IgG   Date Value Ref Range Status   09/07/2021 <3.0 0.0 - 7.1 IU/mL Final     Comment:     Negative- Absence of detectable Toxoplasma gondii IgG antibodies. A negative result does not rule out acute infection.         Imaging:  Dental CT without contrast 9/16/2021   IMPRESSION:  Lucency about the roots of the left first mandibular molar that is  concerning for a periapical abscess. Clinical correlation  recommended..    CT chest 9/7/2021  IMPRESSION:   1. Extensive bilateral groundglass and reticular opacities are  slightly increased compared to chest CT from 8/30/2021. This likely  represents progression of known NSIP, however superimposed infection  or cannot be excluded.  2. Small bilateral pleural effusions.  3. Stable mediastinal lymphadenopathy, likely reactive.    CT chest OSH 6/28/2021  IMPRESSION:   1.  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   2.  Mediastinal and bilateral hilar lymphadenopathy is likely inflammatory/reactive secondary to lung disease.   3.  Healed pulmonary granulomatous disease.   4.  Findings discussed with RASHAAD Maxwell, via telephone at 2:26 PM on 6/28/2021.         Ming Abebe MD  [facility]  Contact information available via [facility] Paging/Directory     09/16/2021

## 2021-09-16 NOTE — PLAN OF CARE
"Neuro: A&Ox4. Wears glasses  Cardiac: SR/ST. VSS. /85 (BP Location: Left arm)   Pulse 66   Temp 97.4  F (36.3  C) (Axillary)   Resp 18   Ht 1.626 m (5' 4\")   Wt 68.1 kg (150 lb 2.1 oz)   SpO2 100%   BMI 25.77 kg/m     Respiratory: Sating >90% on 50% BiPAP overnight. HFNC 35L and 50% during the day.   GI/: Adequate urine output via urinal. BM x 1 9/15  Diet/appetite: Tolerating regular diet.   Activity:  Assist of 1, up to chair and in room. Patient makes frequent, major changes in position overnight in bed.   Pain: Denies.   Skin: Redness in nares, Vaseline used on day shift - patient declined reapplication in evening. Blanchable redness on bridge of nose d/t BiPAP overnights - utilized gel pad barrier tonight. Scrotal redness noted. Wound care once per day per order (completed on day shift). Patient denies discomfort related to this.  LDA's: Right triple lumen PICC saline locked. All lumens flushing and drawing back without issue.    Plan: Continue with POC. Plan to taper oral steroids in preparation for possible lung transplant listing. Notify primary team with changes.    "

## 2021-09-16 NOTE — PROVIDER NOTIFICATION
Time of notification: 9:24 AM  Provider notified:paged Gold 10 Provide rvia text on As[employer].  Patient status:Noted when doing assessment this morning new bilateral groin  skin breakdown. Paged Gold 10 for f/u WOC consult.   Temp:  [97.4  F (36.3  C)-98.5  F (36.9  C)] 97.7  F (36.5  C)  Pulse:  [] 63  Resp:  [18-22] 18  BP: (126-138)/(71-93) 130/76  FiO2 (%):  [45 %-50 %] 45 %  SpO2:  [91 %-100 %] 94 %  Orders received: awaiting orders

## 2021-09-16 NOTE — CODE/RAPID RESPONSE
Rapid Response Team Note    Assessment   In assessment a rapid response was called on Edson Thornton due to lactic acidosis. This presentation is likely due to ILD and steroid taper and worsened by Chronic immunosuppression  RA.     Plan   -  LR 500cc bolus  - lactate recheck in 2hr  - blood cx x2  - check procal  - pt just completed 14-day course of fluconazole and cefepime on 9/15, hold off on Abx as no localizing signs of infection   -  The [place] [id number] internal med primary team was able to be reached and they are in agreement with the above plan.  -  Disposition: The patient will remain on the current unit. We will continue to monitor this patient closely.  -  Reassessment and plan follow-up will be performed by the rapid response team      Lexii Whitehead PA-C  [facility] RRT AMCOM Job Code Contact #[phone]    Hospital Course   Brief Summary of events leading to rapid response:   56M here for transplant workup for RA-associated ILD and with hypoxia, tachycardia triggered RN-guided lactate draw that returns at 4.1    Pt feels in his usual state of health    Denies pain including in the abdomen, denies N/V   Denies dizziness/lightheadedness, fevers, chills sweats,  Denies bleeding, including  hematuria, melena/hematochezia.    Denies chest pain, sob, new cough, congestion  Denies  new rashes, lumps lesions  Denies other complaints       Admission Diagnosis:   ILD (interstitial lung disease) (H) [J84.9] *    Physical Exam   Temp: 98.2  F (36.8  C) Temp  Min: 97.4  F (36.3  C)  Max: 98.7  F (37.1  C)  Resp: 20 Resp  Min: 17  Max: 22  SpO2: 92 % SpO2  Min: 90 %  Max: 100 %  Pulse: 105 Pulse  Min: 63  Max: 110    No data recorded  BP: 126/80 Systolic (24hrs), Av , Min:125 , Max:142   Diastolic (24hrs), Av, Min:72, Max:93     I/Os: I/O last 3 completed shifts:  In: 550 [P.O.:540; I.V.:10]  Out: 2500 [Urine:2500]     Exam:   General: chronically ill appearing  Mental Status: baseline mental status.  CV: RRR no  m/r/g  PULM: minimal rales bilaterally, nonlabored breathing on [place] NC 35L  HEENT: NC AT  Skin: noncyanotic, anicteric  Extremities; + PT and radial pulses, no edema    Significant Results and Procedures   Lactic Acid:   Recent Labs   Lab Test 09/16/21  1620 09/15/21  0454 09/14/21  0439   LACT 4.1* 1.7 1.3     CBC:   Recent Labs   Lab Test 09/16/21  0645 09/15/21  0454 09/14/21  0439   WBC 21.6* 23.3* 22.5*   HGB 9.9* 9.8* 10.1*   HCT 31.2* 30.2* 32.8*   * 148* 108*        Sepsis Evaluation   The patient is not known to have an infection.  NO EVIDENCE OF SEPSIS at this time.  Vital sign, physical exam, and lab findings are due to streroid taper .

## 2021-09-16 NOTE — PROGRESS NOTES
[facility]    Progress Note    Date of Service (when I saw the patient): 09/16/2021    Assessment & Plan   Edson Thornton is a 56 year old male with history of NSIP associated with rheumatoid arthritis and traction bronchiectasis who was admitted on 9/5/2021 with acute on chronic hypoxic respiratory failure likely from ILD. Weaning steroids now in preparation for transplant.     1. Acute hypoxic respiratory failure  secondary to progressive ILD, all are POA  -Appreciate pulm consult  -Continue BiPAP at night, HFNC during day  -Continue azithromycin as an anti-inflammatory  -I placed the order for the prednisone taper based on recommendations of pulmonology service  -Continue  mg twice daily  -Started low-dose furosemide at 20 mg daily for 3 days to maximize possibilities of weaning off oxygen as it relates to any volume overload related to administration of intravenous antibiotics     2.  Lung transplant candidacy  -Ordered dental CT based on recommendations of transplant pulmonology service as a part of work-up prior to consideration for lung transplant  -Requested an evaluation from transplant ID given indeterminant confusion Gold in the setting of the patient being a prior member of the knee the end visiting the [place] and [place] in order to ensure no prior significant exposure to tuberculosis as a part of lung transplant evaluation    3.  Vitamin D deficiency, POA  -Started vitamin D replacement    4. Rheumatoid arthritis, all are POA  Patient seen by rheumatology this admission. No acute flare of rheumatoid arthritis. SINCERE antibodies negative, flow cytometry negative for CD19 cells, CK normal, aldolase normal. Anti-Irene-1 negative. The patient was treated with leflunomide from 5/21 to 7/21. Rheumatology considers that leflunomide toxicity might contribute to the patient's ILD. -Continue cholestyramine TID to increase leflunomide clearance.      5. 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 chronic medical problems  Hypothyroidism   Continue 25 mg levothyroxin qday     SALTY  Continue BiPAP at night.      Hypertension  On 5mg amlodipine, BP well controlled today.     Anxiety  Depression  Continue 5mg escitalopram        DVT Prophylaxis: UFH 5000U, q12h  Code Status: Full Code     Disposition: Pending prednisone taper, anticipate prolonged hospitalization due to potential disease progression and lung transplant.  Jordan Sears    Interval History   The patient unfortunately denies any significant change in his shortness of breath.  No reported new chest pain or cough.  Four-point review of systems is otherwise negative    -Data reviewed today: I reviewed all new labs and imaging results over the last 24 hours.    Physical Exam   Temp: 98.5  F (36.9  C) Temp src: Oral BP: 136/72 Pulse: 63   Resp: 17 SpO2: 90 % O2 Device: High Flow Nasal Cannula (HFNC) Oxygen Delivery: 35 LPM  Vitals:    09/15/21 0639 09/15/21 0650 09/16/21 0439   Weight: 67.5 kg (148 lb 13 oz) 65 kg (143 lb 4.8 oz) 68.1 kg (150 lb 2.1 oz)     Vital Signs with Ranges  Temp:  [97.4  F (36.3  C)-98.5  F (36.9  C)] 98.5  F (36.9  C)  Pulse:  [] 63  Resp:  [17-22] 17  BP: (126-138)/(72-93) 136/72  FiO2 (%):  [45 %-50 %] 50 %  SpO2:  [90 %-100 %] 90 %  I/O last 3 completed shifts:  In: 1150 [P.O.:1140; I.V.:10]  Out: 1650 [Urine:1650]    Constitutional:   Increased work of breathing, AAOx3, NAD  Respiratory: On high flow nc oxygen, coarse breathing sounds with diffuse crackles on both lung fields  Cardiovascular: RRR, no r/m/g, S1, S2  GI: nontender  Skin/Integumen: warm, no jaundice, mild edema at the level of the ankles.    Medications     - MEDICATION INSTRUCTIONS -       - MEDICATION INSTRUCTIONS -         amLODIPine  5 mg Oral Daily     azithromycin  250 mg Oral Daily     cholestyramine  2 packet Oral  TID     escitalopram  5 mg Oral QPM     fluticasone  1 puff Inhalation Daily     furosemide  20 mg Oral Daily     heparin ANTICOAGULANT  5,000 Units Subcutaneous Q12H     insulin aspart   Subcutaneous TID AC     insulin aspart  1-7 Units Subcutaneous TID AC     insulin aspart  1-5 Units Subcutaneous At Bedtime     insulin glargine  8 Units Subcutaneous QAM AC     levothyroxine  25 mcg Oral QAM AC     melatonin  5 mg Oral At Bedtime     mycophenolate  500 mg Oral BID IS     pantoprazole  40 mg Oral BID AC     predniSONE  60 mg Oral Daily     sodium chloride (PF)  3 mL Intracatheter Q8H     sulfamethoxazole-trimethoprim  1 tablet Oral Once per day on Mon Wed Fri     vitamin D2  50,000 Units Oral Q7 Days       Data   Recent Labs   Lab 09/16/21  0825 09/16/21  0645 09/15/21  2236 09/15/21  0801 09/15/21  0454 09/14/21  0754 09/14/21  0439   WBC  --  21.6*  --   --  23.3*  --  22.5*   HGB  --  9.9*  --   --  9.8*  --  10.1*   MCV  --  90  --   --  88  --  92   PLT  --  120*  --   --  148*  --  108*   NA  --  138  --   --  138  --  136   POTASSIUM  --  3.5  --   --  3.5  --  3.6   CHLORIDE  --  107  --   --  104  --  104   CO2  --  25  --   --  26  --  25   BUN  --  14  --   --  13  --  14   CR  --  0.61*  --   --  0.62*  --  0.61*   ANIONGAP  --  6  --   --  8  --  7   ERIK  --  8.7  --   --  8.5  --  8.6   GLC 93 95 111*   < > 136*   < > 110*    < > = values in this interval not displayed.       No results found for this or any previous visit (from the past 24 hour(s)).

## 2021-09-16 NOTE — PROGRESS NOTES
[facility]    Progress Note    Date of Service (when I saw the patient): 09/15/2021    Assessment & Plan   Edson Thornton is a 56 year old male with history of NSIP associated with rheumatoid arthritis and traction bronchiectasis who was admitted on 9/5/2021 with acute on chronic hypoxic respiratory failure likely from ILD. Weaning steroids now in preparation for transplant.        1. Acute hypoxic respiratory failure 2/2 progressive ILD, all are POA  -Appreciate pulm consult  -Continue BiPAP at night, HFNC during day  -Discontinued fluconazole and cefepime since the patient completed a 14 days course of antibiotic therapy  -Continue azithromycin as an anti-inflammatory  -Discontinued methylprednisolone  -Start prednisone 60 mg daily for 3 days on 9/16/2021 through 9/19/2021  -The patient to be started on prednisone 50 mg daily on 9/20/2021 for 3 days then prednisone 40 mg daily for 3 days, then prednisone 30 mg daily for 3 days, then prednisone 20 mg daily in order to be a candidate for transplant  -Continue MMF per pulmology     2. Rheumatoid arthritis, all are POA  Patient seen by rheumatology this admission. No acute flare of rheumatoid arthritis. SINCERE antibodies negative, flow cytometry negative for CD19 cells, CK normal, aldolase normal. Anti-Irene-1 negative. The patient was treated with leflunomide from 5/21 to 7/21. Rheumatology considers that leflunomide toxicity might contribute to the patient's ILD. -Continue cholestyramine TID to increase leflunomide clearance.      3. Steroid induced diabetes, all are POA  -Increase insulin glargine to 8 units  -Continue insulin NovoLog at medium insulin resistance    4.  Vitamin D deficiency, POA  This was noted on transplant evaluation.  -Started vitamin D replacement    5. indeterminant QuantiFERON gold, POA  -I will discuss with transplant ID on 9/16/2021 further work-up given that the patient has been in the [employer] and has  traveled to [place] prior[place] although he had a negative PPD in the early 200s    6. chronic medical problems  Hypothyroidism   Continue 25 mg levothyroxin qday     SALTY  Continue BiPAP at night.      Hypertension  On 5mg amlodipine, BP well controlled today.     Anxiety  Depression  Continue 5mg escitalopram        DVT Prophylaxis: UFH 5000U, q12h  Code Status: Full Code     Disposition: Pending prednisone taper, anticipate prolonged hospitalization due to potential disease progression and lung transplant.  Jordan Sears    Interval History   The patient reported no significant change in his shortness of breath.  No reported new chest pain or cough.  Four-point review of systems is otherwise negative    -Data reviewed today: I reviewed all new labs and imaging results over the last 24 hours.    Physical Exam   Temp: 97.9  F (36.6  C) Temp src: Oral BP: 138/82 Pulse: 86   Resp: 22 SpO2: 95 % O2 Device: High Flow Nasal Cannula (HFNC) Oxygen Delivery: 35 LPM  Vitals:    09/14/21 0554 09/15/21 0639 09/15/21 0650   Weight: 65 kg (143 lb 4.8 oz) 67.5 kg (148 lb 13 oz) 65 kg (143 lb 4.8 oz)     Vital Signs with Ranges  Temp:  [97.5  F (36.4  C)-98.5  F (36.9  C)] 97.9  F (36.6  C)  Pulse:  [61-86] 86  Resp:  [18-22] 22  BP: (122-143)/(71-86) 138/82  FiO2 (%):  [50 %] 50 %  SpO2:  [91 %-95 %] 95 %  I/O last 3 completed shifts:  In: 1300 [P.O.:1300]  Out: 2650 [Urine:2650]    Constitutional:   Increased work of breathing, AAOx3, NAD  Respiratory: On high flow nc oxygen, coarse breathing sounds with diffuse crackles on both lung fields  Cardiovascular: RRR, no r/m/g, S1, S2  GI: nontender  Skin/Integumen: warm, no jaundice, mild edema at the level of the ankles.    Medications     - MEDICATION INSTRUCTIONS -       - MEDICATION INSTRUCTIONS -         amLODIPine  5 mg Oral Daily     azithromycin  250 mg Oral Daily     cholestyramine  2 packet Oral TID     escitalopram  5 mg Oral QPM     fluticasone  1 puff  Inhalation Daily     heparin ANTICOAGULANT  5,000 Units Subcutaneous Q12H     insulin aspart   Subcutaneous TID AC     insulin aspart  1-7 Units Subcutaneous TID AC     insulin aspart  1-5 Units Subcutaneous At Bedtime     [START ON 9/16/2021] insulin glargine  8 Units Subcutaneous QAM AC     levothyroxine  25 mcg Oral QAM AC     melatonin  3 mg Oral At Bedtime     mycophenolate  500 mg Oral BID IS     pantoprazole  40 mg Oral BID AC     [START ON 9/16/2021] predniSONE  60 mg Oral Daily     sodium chloride (PF)  3 mL Intracatheter Q8H     sulfamethoxazole-trimethoprim  1 tablet Oral Once per day on Mon Wed Fri     vitamin D2  50,000 Units Oral Q7 Days       Data   Recent Labs   Lab 09/15/21  1631 09/15/21  1213 09/15/21  0801 09/15/21  0454 09/15/21  0408 09/14/21  0754 09/14/21  0439 09/13/21  0723 09/13/21  0449   WBC  --   --   --  23.3*  --   --  22.5*  --  27.1*   HGB  --   --   --  9.8*  --   --  10.1*  --  9.6*   MCV  --   --   --  88  --   --  92  --  89   PLT  --   --   --  148*  --   --  108*  --  138*   NA  --   --   --  138  --   --  136  --  136   POTASSIUM  --   --   --  3.5  --   --  3.6  --  3.3*   CHLORIDE  --   --   --  104  --   --  104  --  106   CO2  --   --   --  26  --   --  25  --  29   BUN  --   --   --  13  --   --  14  --  13   CR  --   --   --  0.62*  --   --  0.61*  --  0.54*   ANIONGAP  --   --   --  8  --   --  7  --  1*   ERIK  --   --   --  8.5  --   --  8.6  --  8.1*   * 206* 124* 136*   < >   < > 110*   < > 148*    < > = values in this interval not displayed.       No results found for this or any previous visit (from the past 24 hour(s)).    Sherri Layne MD

## 2021-09-17 ENCOUNTER — APPOINTMENT (OUTPATIENT)
Dept: PHYSICAL THERAPY | Facility: CLINIC | Age: 56
End: 2021-09-17
Attending: STUDENT IN AN ORGANIZED HEALTH CARE EDUCATION/TRAINING PROGRAM
Payer: COMMERCIAL

## 2021-09-17 LAB
ANION GAP SERPL CALCULATED.3IONS-SCNC: 4 MMOL/L (ref 3–14)
BUN SERPL-MCNC: 9 MG/DL (ref 7–30)
CALCIUM SERPL-MCNC: 8.7 MG/DL (ref 8.5–10.1)
CHLORIDE BLD-SCNC: 106 MMOL/L (ref 94–109)
CO2 SERPL-SCNC: 28 MMOL/L (ref 20–32)
CREAT SERPL-MCNC: 0.59 MG/DL (ref 0.66–1.25)
CRP SERPL-MCNC: 6.7 MG/L (ref 0–8)
ERYTHROCYTE [DISTWIDTH] IN BLOOD BY AUTOMATED COUNT: 17.7 % (ref 10–15)
GFR SERPL CREATININE-BSD FRML MDRD: >90 ML/MIN/1.73M2
GLUCOSE BLD-MCNC: 78 MG/DL (ref 70–99)
GLUCOSE BLDC GLUCOMTR-MCNC: 160 MG/DL (ref 70–99)
GLUCOSE BLDC GLUCOMTR-MCNC: 174 MG/DL (ref 70–99)
GLUCOSE BLDC GLUCOMTR-MCNC: 200 MG/DL (ref 70–99)
GLUCOSE BLDC GLUCOMTR-MCNC: 86 MG/DL (ref 70–99)
HBV SURFACE AG SERPL QL IA: NONREACTIVE
HCT VFR BLD AUTO: 30.7 % (ref 40–53)
HGB BLD-MCNC: 10 G/DL (ref 13.3–17.7)
HIV 1+2 AB+HIV1 P24 AG SERPL QL IA: NONREACTIVE
LACTATE SERPL-SCNC: 1.9 MMOL/L (ref 0.7–2)
LACTATE SERPL-SCNC: 2.3 MMOL/L (ref 0.7–2)
MCH RBC QN AUTO: 28.9 PG (ref 26.5–33)
MCHC RBC AUTO-ENTMCNC: 32.6 G/DL (ref 31.5–36.5)
MCV RBC AUTO: 89 FL (ref 78–100)
NRBC # BLD AUTO: 0 10E3/UL
NRBC BLD AUTO-RTO: 0 /100
PLATELET # BLD AUTO: 95 10E3/UL (ref 150–450)
POTASSIUM BLD-SCNC: 3.3 MMOL/L (ref 3.4–5.3)
POTASSIUM BLD-SCNC: 3.8 MMOL/L (ref 3.4–5.3)
RBC # BLD AUTO: 3.46 10E6/UL (ref 4.4–5.9)
SODIUM SERPL-SCNC: 138 MMOL/L (ref 133–144)
WBC # BLD AUTO: 20.2 10E3/UL (ref 4–11)

## 2021-09-17 PROCEDURE — 250N000011 HC RX IP 250 OP 636: Performed by: PHYSICIAN ASSISTANT

## 2021-09-17 PROCEDURE — 250N000013 HC RX MED GY IP 250 OP 250 PS 637: Performed by: STUDENT IN AN ORGANIZED HEALTH CARE EDUCATION/TRAINING PROGRAM

## 2021-09-17 PROCEDURE — 86140 C-REACTIVE PROTEIN: CPT | Performed by: STUDENT IN AN ORGANIZED HEALTH CARE EDUCATION/TRAINING PROGRAM

## 2021-09-17 PROCEDURE — 84132 ASSAY OF SERUM POTASSIUM: CPT | Performed by: INTERNAL MEDICINE

## 2021-09-17 PROCEDURE — 250N000013 HC RX MED GY IP 250 OP 250 PS 637: Performed by: INTERNAL MEDICINE

## 2021-09-17 PROCEDURE — 83605 ASSAY OF LACTIC ACID: CPT | Performed by: INTERNAL MEDICINE

## 2021-09-17 PROCEDURE — 250N000013 HC RX MED GY IP 250 OP 250 PS 637: Performed by: PHYSICIAN ASSISTANT

## 2021-09-17 PROCEDURE — 94660 CPAP INITIATION&MGMT: CPT

## 2021-09-17 PROCEDURE — 85027 COMPLETE CBC AUTOMATED: CPT | Performed by: INTERNAL MEDICINE

## 2021-09-17 PROCEDURE — 80048 BASIC METABOLIC PNL TOTAL CA: CPT | Performed by: INTERNAL MEDICINE

## 2021-09-17 PROCEDURE — 97110 THERAPEUTIC EXERCISES: CPT | Mod: GP | Performed by: PHYSICAL THERAPIST

## 2021-09-17 PROCEDURE — 120N000003 HC R&B IMCU UMMC

## 2021-09-17 PROCEDURE — 250N000012 HC RX MED GY IP 250 OP 636 PS 637: Performed by: INTERNAL MEDICINE

## 2021-09-17 PROCEDURE — 99233 SBSQ HOSP IP/OBS HIGH 50: CPT | Performed by: INTERNAL MEDICINE

## 2021-09-17 PROCEDURE — 999N000157 HC STATISTIC RCP TIME EA 10 MIN

## 2021-09-17 PROCEDURE — 36592 COLLECT BLOOD FROM PICC: CPT | Performed by: INTERNAL MEDICINE

## 2021-09-17 PROCEDURE — 99232 SBSQ HOSP IP/OBS MODERATE 35: CPT | Mod: GC | Performed by: INTERNAL MEDICINE

## 2021-09-17 RX ORDER — IVERMECTIN 3 MG/1
15 TABLET ORAL ONCE
Status: COMPLETED | OUTPATIENT
Start: 2021-09-17 | End: 2021-09-17

## 2021-09-17 RX ADMIN — ESCITALOPRAM OXALATE 5 MG: 5 TABLET, FILM COATED ORAL at 20:32

## 2021-09-17 RX ADMIN — INSULIN ASPART 9 UNITS: 100 INJECTION, SOLUTION INTRAVENOUS; SUBCUTANEOUS at 14:32

## 2021-09-17 RX ADMIN — LEVOTHYROXINE SODIUM 25 MCG: 0.03 TABLET ORAL at 08:30

## 2021-09-17 RX ADMIN — PREDNISONE 60 MG: 50 TABLET ORAL at 08:30

## 2021-09-17 RX ADMIN — CHOLESTYRAMINE 8 G: 4 POWDER, FOR SUSPENSION ORAL at 22:02

## 2021-09-17 RX ADMIN — AZITHROMYCIN MONOHYDRATE 250 MG: 250 TABLET ORAL at 08:30

## 2021-09-17 RX ADMIN — CHOLESTYRAMINE 8 G: 4 POWDER, FOR SUSPENSION ORAL at 13:11

## 2021-09-17 RX ADMIN — CHOLESTYRAMINE 8 G: 4 POWDER, FOR SUSPENSION ORAL at 08:31

## 2021-09-17 RX ADMIN — SULFAMETHOXAZOLE AND TRIMETHOPRIM 1 TABLET: 800; 160 TABLET ORAL at 08:38

## 2021-09-17 RX ADMIN — INSULIN ASPART 9 UNITS: 100 INJECTION, SOLUTION INTRAVENOUS; SUBCUTANEOUS at 18:39

## 2021-09-17 RX ADMIN — PANTOPRAZOLE SODIUM 40 MG: 40 TABLET, DELAYED RELEASE ORAL at 08:30

## 2021-09-17 RX ADMIN — HEPARIN SODIUM 5000 UNITS: 5000 INJECTION, SOLUTION INTRAVENOUS; SUBCUTANEOUS at 08:30

## 2021-09-17 RX ADMIN — AMLODIPINE BESYLATE 5 MG: 5 TABLET ORAL at 08:30

## 2021-09-17 RX ADMIN — MYCOPHENOLATE MOFETIL 500 MG: 500 TABLET, FILM COATED ORAL at 17:27

## 2021-09-17 RX ADMIN — HEPARIN SODIUM 5000 UNITS: 5000 INJECTION, SOLUTION INTRAVENOUS; SUBCUTANEOUS at 20:32

## 2021-09-17 RX ADMIN — INSULIN ASPART: 100 INJECTION, SOLUTION INTRAVENOUS; SUBCUTANEOUS at 09:56

## 2021-09-17 RX ADMIN — PANTOPRAZOLE SODIUM 40 MG: 40 TABLET, DELAYED RELEASE ORAL at 16:49

## 2021-09-17 RX ADMIN — Medication 5 MG: at 20:32

## 2021-09-17 RX ADMIN — MYCOPHENOLATE MOFETIL 500 MG: 500 TABLET, FILM COATED ORAL at 08:30

## 2021-09-17 RX ADMIN — IVERMECTIN 15 MG: 3 TABLET ORAL at 20:32

## 2021-09-17 RX ADMIN — FLUTICASONE FUROATE 1 PUFF: 100 POWDER RESPIRATORY (INHALATION) at 08:31

## 2021-09-17 ASSESSMENT — MIFFLIN-ST. JEOR: SCORE: 1424

## 2021-09-17 ASSESSMENT — ACTIVITIES OF DAILY LIVING (ADL)
ADLS_ACUITY_SCORE: 11

## 2021-09-17 NOTE — PROVIDER NOTIFICATION
-------------------CRITICAL LAB VALUE-------------------    Lab Value: lactic 4.4   Time of notification: 7:10 PM  MD notified: Gold 10 crosscover, code sepsis called   Patient status:  Temp:  [97.4  F (36.3  C)-98.7  F (37.1  C)] 98.2  F (36.8  C)  Pulse:  [] 105  Resp:  [17-22] 20  BP: (125-142)/(72-93) 126/80  FiO2 (%):  [45 %-60 %] 60 %  SpO2:  [90 %-100 %] 92 %  Orders received: no new orders (code sepsis called earlier today - continue with plan of care. Awaiting blood culture results).

## 2021-09-17 NOTE — PROGRESS NOTES
[facility]    Progress Note    Date of Service (when I saw the patient): 09/17/2021    Assessment & Plan   Edson Thornton is a 56 year old male with history of NSIP associated with rheumatoid arthritis and traction bronchiectasis who was admitted on 9/5/2021 with acute on chronic hypoxic respiratory failure likely from ILD. Weaning steroids now in preparation for transplant.     1. Acute hypoxic respiratory failure  secondary to progressive ILD, all are POA  Although the patient had tachypnea and leukocytosis on 9/16/2021, sepsis has been effectively ruled out.  -Appreciate pulm consult  -Continue BiPAP at night, HFNC during day  -Continue azithromycin as an anti-inflammatory  -Continue prednisone taper based on recommendations of pulmonology service  -Continue  mg twice daily  -Discontinue furosemide    2.  Lung transplant candidacy  -Dental CT with evidence of periapical abscess, therefore request a consult to dental service.  -The patient was seen by dentist who recommended taking the patient to the OR for dental surgery prior to transplant.    - We are currently awaiting an OR slot for this patient given the urgency of clearing the patient from a dental standpoint prior to transplant.  -Although the patient had indeterminate quanteferon gold, this was deemed to be low risk for active or latent tuberculosis based on evaluation of transplant infectious disease.  -Ordered ivermectin at 15 mg X1 dose for treatment of possible stronglydies based on recommendations of transplant ID    3.  Vitamin D deficiency, POA  -Continue vitamin D replacement    4. Rheumatoid arthritis, all are POA  Patient seen by rheumatology this admission. No acute flare of rheumatoid arthritis. SINCERE antibodies negative, flow cytometry negative for CD19 cells, CK normal, aldolase normal. Anti-Irene-1 negative. The patient was treated with leflunomide from 5/21 to 7/21. Rheumatology considers  that leflunomide toxicity might contribute to the patient's ILD. -Continue cholestyramine TID to increase leflunomide clearance.      5. Steroid induced diabetes, all are POA  -Increase insulin glargine to 8 units  -Continue insulin NovoLog at medium insulin resistance    6. chronic medical problems  Hypothyroidism   Continue 25 mg levothyroxin qday     SALTY  Continue BiPAP at night.      Hypertension  On 5mg amlodipine, BP well controlled today.     Anxiety  Depression  Continue 5mg escitalopram        DVT Prophylaxis: UFH 5000U, q12h  Code Status: Full Code     Disposition: Pending prednisone taper, anticipate prolonged hospitalization due to potential disease progression and lung transplant.  Jordan Sears    Interval History   The patient denied any new change in his shortness of breath.  No reported new chest pain or cough.  Four-point review of systems is otherwise negative    -Data reviewed today: I reviewed all new labs and imaging results over the last 24 hours.    Physical Exam   Temp: 97.9  F (36.6  C) Temp src: Oral BP: 136/81 Pulse: 107   Resp: 20 SpO2: 93 % O2 Device: High Flow Nasal Cannula (HFNC) Oxygen Delivery: 30 LPM  Vitals:    09/15/21 0650 09/16/21 0439 09/17/21 0504   Weight: 65 kg (143 lb 4.8 oz) 68.1 kg (150 lb 2.1 oz) 68.3 kg (150 lb 9.2 oz)     Vital Signs with Ranges  Temp:  [97.3  F (36.3  C)-98.2  F (36.8  C)] 97.9  F (36.6  C)  Pulse:  [] 107  Resp:  [17-20] 20  BP: (126-145)/(77-88) 136/81  FiO2 (%):  [50 %-70 %] 50 %  SpO2:  [91 %-96 %] 93 %  I/O last 3 completed shifts:  In: 1945 [P.O.:1445; IV Piggyback:500]  Out: 2125 [Urine:2125]    Constitutional:   Increased work of breathing, AAOx3, NAD  Respiratory: On high flow nc oxygen, coarse breathing sounds with diffuse crackles on both lung fields  Cardiovascular: RRR, no r/m/g, S1, S2  GI: nontender  Skin/Integumen: warm, no jaundice, mild edema at the level of the ankles.    Medications     - MEDICATION INSTRUCTIONS -       -  MEDICATION INSTRUCTIONS -         amLODIPine  5 mg Oral Daily     azithromycin  250 mg Oral Daily     cholestyramine  2 packet Oral TID     escitalopram  5 mg Oral QPM     fluticasone  1 puff Inhalation Daily     heparin ANTICOAGULANT  5,000 Units Subcutaneous Q12H     insulin aspart   Subcutaneous TID AC     insulin aspart  1-7 Units Subcutaneous TID AC     insulin aspart  1-5 Units Subcutaneous At Bedtime     insulin glargine  8 Units Subcutaneous QAM AC     levothyroxine  25 mcg Oral QAM AC     melatonin  5 mg Oral At Bedtime     mycophenolate  500 mg Oral BID IS     pantoprazole  40 mg Oral BID AC     [START ON 9/18/2021] predniSONE  50 mg Oral Daily    Followed by     [START ON 9/21/2021] predniSONE  40 mg Oral Daily    Followed by     [START ON 9/24/2021] predniSONE  30 mg Oral Daily    Followed by     [START ON 9/27/2021] predniSONE  20 mg Oral Daily     sodium chloride (PF)  3 mL Intracatheter Q8H     sodium chloride (PF)  3 mL Intracatheter Q8H     sulfamethoxazole-trimethoprim  1 tablet Oral Once per day on Mon Wed Fri     vitamin D2  50,000 Units Oral Q7 Days       Data   Recent Labs   Lab 09/17/21  1308 09/17/21  1149 09/17/21  0828 09/17/21  0441 09/16/21  0825 09/16/21  0645 09/15/21  0801 09/15/21  0454   WBC  --   --   --  20.2*  --  21.6*  --  23.3*   HGB  --   --   --  10.0*  --  9.9*  --  9.8*   MCV  --   --   --  89  --  90  --  88   PLT  --   --   --  95*  --  120*  --  148*   NA  --   --   --  138  --  138  --  138   POTASSIUM  --  3.8  --  3.3*  --  3.5   < > 3.5   CHLORIDE  --   --   --  106  --  107  --  104   CO2  --   --   --  28  --  25  --  26   BUN  --   --   --  9  --  14  --  13   CR  --   --   --  0.59*  --  0.61*  --  0.62*   ANIONGAP  --   --   --  4  --  6  --  8   ERIK  --   --   --  8.7  --  8.7  --  8.5   *  --  86 78   < > 95   < > 136*    < > = values in this interval not displayed.       No results found for this or any previous visit (from the past 24 hour(s)).

## 2021-09-17 NOTE — PLAN OF CARE
Major Shift Events: Switched to HFNC for day time, requiring increased FiO2 during exercise. Up to chair for majority of day. VSS. Had BM x2. No reports of pain.     Plan: Continue steroid taper and lung transplant workup.      For vital signs and complete assessments, please see documentation flowsheets.

## 2021-09-17 NOTE — CONSULTS
"Dental Service Consultation        Edson Thornton MRN# [id number]   YOB: 1965 Age: 56 year old   Date of Admission: 9/5/2021     Reason for consult: I was asked by Dr Herzog to evaluate this patient for active dental infection.           Assessment and Plan:   Assessment:      Extra-oral :   -Patient presents symmetry between right and left side  -No lymphadenopathy noticed.     Intra-oral:  - Patient is missing teeth #1, 16, 17, 18, 31, 32.  - Patient presents gross active decay on distal of right maxillary canine  - Patient presents severe attrition on all teeth (wearing surfaces at the occlusal of all teeth)  - Patient presents fair oral hygiene  - Patient does not present any sensitivity to thermal change, percussion or palpation.   - Patient do not present intra-oral swelling  - Patient light recession on all teeth.     Radiology:  - Apical radiolucency at apex of tooth #19 (left first mandibular molar)  - Light Generalized loss of bone around all present teeth (upper and lower arch)  - Patient presents clear decay activity on right maxillary canine     Plan:  -Extraction of first left mandibular molar is recommended since it is source of an active dental infection.  -After discussion with Dr Herzog, Ideal plan would be to see the patient in the OR due to his incapacity to be seen at the [facility]. Healthcare team has been notified of the difficulty of adding Dental OR cases at the moment. Healthcare team has been informed that dental team will notify them as soon as a time sloth is found to see the patient.    All information have been communicated to the patient and to healthcare team.     For further information  Clinic information:   [facility]   [address] [place], [place] [address] [place] [address] [phone]         Chief Complaint:   I need to have my teeth looked at before my transplant    \"History is obtained from the patient\"         History of " Present Illness:   This patient is a 56 year old male who is admitted to [facility] with history of NSIP associated with rheumatoid arthritis and traction bronchiectasis. He was admitted on 9/5/2021 with acute chronic hypoxic respiratory failure likely from interstitial lung disease. Patient is now in work up for lung transplant.                Past Medical History:     Past Medical History:   Diagnosis Date     Anxiety      Depression      HLD (hyperlipidemia)      HTN (hypertension)      Hypothyroidism      ILD (interstitial lung disease) (H)      SALTY on CPAP      Oxygen dependent     BL 4L since ~6/2021     Rheumatoid arthritis (H)     signs ~5/2020, dx 5/2021     Steroid-induced hyperglycemia      Traction bronchiectasis (H)              Past Surgical History:     Past Surgical History:   Procedure Laterality Date     COLONOSCOPY W/ BIOPSIES AND POLYPECTOMY  07/21/2020     CV CORONARY ANGIOGRAM N/A 9/8/2021    Procedure: Coronary Angiogram with possible intervention;  Surgeon: Jovon Bullock MD;  Location:  HEART CARDIAC CATH LAB     CV RIGHT HEART CATH MEASUREMENTS RECORDED N/A 9/8/2021    Procedure: Right Heart Cath;  Surgeon: Jovon Bullock MD;  Location:  HEART CARDIAC CATH LAB     HERNIA REPAIR       right acl       XR ACROMIOCLAVICULAR JOINT BILATERAL                 Social History:     Social History     Tobacco Use     Smoking status: Never Smoker     Smokeless tobacco: Never Used   Substance Use Topics     Alcohol use: Never             Family History:     Family History   Problem Relation Age of Onset     Diabetes Type 1 Mother      Heart Disease Mother      Chronic Obstructive Pulmonary Disease Mother      Rheumatoid Arthritis Father      Emphysema Paternal Grandfather              Immunizations:     There is no immunization history on file for this patient.          Allergies:   No Known Allergies          Medications:     Current Facility-Administered Medications Ordered in  Epic   Medication Dose Route Frequency Last Rate Last Admin     acetaminophen (TYLENOL) tablet 325-650 mg  325-650 mg Oral Q4H PRN         amLODIPine (NORVASC) tablet 5 mg  5 mg Oral Daily   5 mg at 09/17/21 0830     azithromycin (ZITHROMAX) tablet 250 mg  250 mg Oral Daily   250 mg at 09/17/21 0830     benzocaine 20% (HURRICAINE/TOPEX) 20 % spray 0.5-1 mL  1-2 spray Mouth/Throat Q3H PRN         carboxymethylcellulose PF (REFRESH PLUS) 0.5 % ophthalmic solution 1 drop  1 drop Both Eyes Q1H PRN         cholestyramine (QUESTRAN) Packet 8 g  2 packet Oral TID   8 g at 09/17/21 1311     glucose gel 15-30 g  15-30 g Oral Q15 Min PRN        Or     dextrose 50 % injection 25-50 mL  25-50 mL Intravenous Q15 Min PRN        Or     glucagon injection 1 mg  1 mg Subcutaneous Q15 Min PRN         escitalopram (LEXAPRO) tablet 5 mg  5 mg Oral QPM   5 mg at 09/16/21 2023     fluticasone (ARNUITY ELLIPTA) 100 MCG/ACT inhaler 1 puff  1 puff Inhalation Daily   1 puff at 09/17/21 0831     heparin ANTICOAGULANT injection 5,000 Units  5,000 Units Subcutaneous Q12H   5,000 Units at 09/17/21 0830     insulin aspart (NovoLOG) injection (RAPID ACTING)   Subcutaneous TID AC   9 Units at 09/17/21 1432     insulin aspart (NovoLOG) injection (RAPID ACTING)  1-7 Units Subcutaneous TID AC   1 Units at 09/17/21 1728     insulin aspart (NovoLOG) injection (RAPID ACTING)  1-5 Units Subcutaneous At Bedtime   2 Units at 09/12/21 2120     insulin glargine (LANTUS PEN) injection 8 Units  8 Units Subcutaneous QAM AC   8 Units at 09/17/21 0831     ipratropium - albuterol 0.5 mg/2.5 mg/3 mL (DUONEB) neb solution 3 mL  3 mL Nebulization Q2H PRN        Or     ipratropium - albuterol 0.5 mg/2.5 mg/3 mL (DUONEB) neb solution 3 mL  3 mL Nebulization Q2H PRN         levothyroxine (SYNTHROID/LEVOTHROID) tablet 25 mcg  25 mcg Oral QAM AC   25 mcg at 09/17/21 0830     lidocaine (LMX4) cream   Topical Q1H PRN         lidocaine (LMX4) cream   Topical Q1H PRN          lidocaine 1 % 0.1-1 mL  0.1-1 mL Other Q1H PRN         lidocaine 1 % 0.1-1 mL  0.1-1 mL Other Q1H PRN         melatonin tablet 5 mg  5 mg Oral At Bedtime   5 mg at 09/16/21 2023     mycophenolate (GENERIC EQUIVALENT) tablet 500 mg  500 mg Oral BID IS   500 mg at 09/17/21 1727     No lozenges or gum should be given while patient on BIPAP/AVAPS/AVAPS AE   Does not apply Continuous PRN         ondansetron (ZOFRAN-ODT) ODT tab 4 mg  4 mg Oral Q6H PRN        Or     ondansetron (ZOFRAN) injection 4 mg  4 mg Intravenous Q6H PRN         oxyCODONE (ROXICODONE) tablet 5 mg  5 mg Oral Q4H PRN        Or     oxyCODONE IR (ROXICODONE) tablet 10 mg  10 mg Oral Q4H PRN         pantoprazole (PROTONIX) EC tablet 40 mg  40 mg Oral BID AC   40 mg at 09/17/21 1649     Patient may continue current oral medications   Does not apply Continuous PRN         polyethylene glycol (MIRALAX) Packet 17 g  17 g Oral Daily PRN         [START ON 9/18/2021] predniSONE (DELTASONE) tablet 50 mg  50 mg Oral Daily        Followed by     [START ON 9/21/2021] predniSONE (DELTASONE) tablet 40 mg  40 mg Oral Daily        Followed by     [START ON 9/24/2021] predniSONE (DELTASONE) tablet 30 mg  30 mg Oral Daily        Followed by     [START ON 9/27/2021] predniSONE (DELTASONE) tablet 20 mg  20 mg Oral Daily         sodium chloride (PF) 0.9% PF flush 3 mL  3 mL Intracatheter Q8H   3 mL at 09/17/21 1649     sodium chloride (PF) 0.9% PF flush 3 mL  3 mL Intracatheter q1 min prn   3 mL at 09/17/21 1145     sodium chloride (PF) 0.9% PF flush 3 mL  3 mL Intracatheter Q8H   3 mL at 09/17/21 1135     sodium chloride (PF) 0.9% PF flush 3 mL  3 mL Intracatheter q1 min prn   3 mL at 09/16/21 0837     sulfamethoxazole-trimethoprim (BACTRIM DS) 800-160 MG per tablet 1 tablet  1 tablet Oral Once per day on Mon Wed Fri 1 tablet at 09/17/21 0838     vitamin D2 (ERGOCALCIFEROL) 40654 units (1250 mcg) capsule 50,000 Units  50,000 Units Oral Q7 Days   50,000 Units at 09/15/21  1418     No current [facility]-ordered outpatient medications on file.             Review of Systems:     The 10 point Review of Systems is negative other than noted in the HPI            Physical Exam:   Vitals were reviewed  Temp: 97.9  F (36.6  C) Temp src: Oral BP: 136/81 Pulse: 107   Resp: 20 SpO2: 93 % O2 Device: High Flow Nasal Cannula (HFNC) Oxygen Delivery: 30 LPM    HEENT: NC/AT, EOMI, PERRL,no lymphadenopathy observed, no induration or erythema, no abnormal skin lesions, inferior border of mandible is palpable bilaterally. PABLO >45mm. No TMJ discomfort bilaterally.     Intra-oral exam: OP clear, uvula midline,fair oral hygiene, no edema, FOM is flat bilaterally, many missing teeth, patient presents gross active decay upper right canine     Patient denies having difficulty breathing, swallowing, talking and eating.       Data:   Radiographic interpretation: Dental CT taken on 09-  Osseous pathology: None  Pulpal Pathology: Patient presents first left mandibular molar with pulpal necrosis and right maxillary canine with Root canal treatment  Periodontal Pathology: Patient present chronic apical periodontitis on first left mandibular molar.  Caries: Right maxillary canine.  Odontogenic pathology: None    The patient was discussed with: Dr Jamison MCCARTHY, DMD  PGY1  Pager: [phone]

## 2021-09-17 NOTE — PLAN OF CARE
Neuro: A&Ox4. Wears glasses.   Cardiac: SR/ST. VSS.   Respiratory: Sating >90% on 50% BiPAP overnight. HFNC during the day.   GI/: Adequate urine output via urinal. BM X1  Diet/appetite: Tolerating regular diet. Reports eating well during the day.   Activity:  Assist of 1, up to commode. Short of breath with exertion.   Pain: Denies  Skin: Scrotal redness with 2 small open areas and scant serosanguinous drainage. Cleansed and barrier cream applied per order.   LDA's: Right PICC saline locked. Caps changed. All ports flushed and drawing back.     Code sepsis called last evening due to lactic recheck of 4.4. Thought to be d/t steroid taper - patient vitally stable. Requested recheck which was 1.9 at 0058.     Plan: Continue with POC. Notify primary team with changes. Patient aware of plan for gradual steroid taper in preparation for lung transplant listing.

## 2021-09-17 NOTE — PROVIDER NOTIFICATION
Time of notification: 12:02 AM  Provider notified: Gold hot cross #[id number]  Patient status: No lactic recheck ordered currently. Last result 4.4 but per note there is no evidence of sepsis. Wanted to confirm if a recheck should be ordered.    Temp:  [97.3  F (36.3  C)-98.7  F (37.1  C)] 97.5  F (36.4  C)  Pulse:  [] 72  Resp:  [17-20] 18  BP: (125-142)/(72-88) 139/81  FiO2 (%):  [45 %-60 %] 50 %  SpO2:  [90 %-100 %] 94 %  Orders received:      Lactic ordered - result 1.9

## 2021-09-17 NOTE — CONSULTS
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  -Apply THIN layer of Critic-Aid Thick Moisture Barrier Paste to all skin that is in contact with stool or urine.  -With repeat cleansings, DO NOT scrub Critic-Aid down to skin, just gently remove surface incontinence soil and reapply additional Critic Aid, if needed.  -If complete removal of Critic-Aid is required, use a warm wash cloth and Marcelino cleanser: place the warm wash cloth to the area for a minute and then cleanse, or use mineral oil/baby oil and soft disposable wash cloth.  .    Avoid brief or pull-up in bed, use only bed underpad.      Orders Written  Recommended provider order: None, at this time  [facility] Nurse follow-up plan:weekly  Nursing to notify the Provider(s) and re-consult the [facility] Nurse if wound(s) deteriorates or new skin concern.    Patient History  According to provider note(s):  56 year old male with history of NSIP associated with rheumatoid arthritis and traction bronchiectasis who was admitted on 9/5/2021 with acute on chronic hypoxic respiratory failure likely from ILD. Weaning steroids now in preparation for transplant.     Objective Data  Containment of urine/stool: urinal, bed pad.  no diarrhea    Active Diet Order  Orders Placed This Encounter      Regular Diet Adult      Output:   I/O last 3 completed shifts:  In: 550 [P.O.:540; I.V.:10]  Out: 2500 [Urine:2500]    Risk Assessment:   Sensory Perception: 4-->no impairment  Moisture: 4-->rarely moist  Activity: 3-->walks occasionally  Mobility: 4-->no limitation  Nutrition: 3-->adequate  Friction and Shear: 3-->no apparent  problem  Luke Score: 21                          Labs:   Recent Labs   Lab 09/16/21  1620 09/16/21  0645 09/15/21  0454   HGB  --  9.9*  --    WBC  --  21.6*  --    CRP 6.7  --    < >    < > = values in this interval not displayed.       Physical Exam  Areas of skin assessed: focused groin    Wound Location: bilateral groin folds    Right and Left groin folds  Date of last photo 9/6/21  Wound History: pt using urinal for voids with additional 3 incontinent episodes noted yesterday.  Staff have been applying no-sting barrier film    Wound Base: 100 % pink dermis with peeling wound edges      Palpation of the wound bed: normal      Drainage: scant     Description of drainage: serous     Measurements (length x width x depth, in cm) both sides approximately 5 cm x 3 cm x 0.1    Periwound skin: macerated, peeling       Color: pink      Temperature: normal   Odor: mild  Pain: denies ,     Interventions  Visual inspection and assessment completed   Wound Care Rationale Provide protection   Wound Care: done per plan of care  Supplies: at bedside  Current off-loading measures: Pillows  Current support surface: Standard  Atmos Air mattress  Education provided to: Moisture management and Hygiene  Discussed plan of care with Patient

## 2021-09-17 NOTE — CODE/RAPID RESPONSE
Rapid Response Team Note    Assessment   In assessment a rapid response was called on Edson Thornton due to lactic acidosis. This presentation is likely due to steroids, please see my prior RRT note    Plan   -  See my prior note, infection workup sent and received 500cc LR  - hold off on Abx at this time  -  The Internal Medicine primary team was able to be reached and they are in agreement with the above plan.  -  Disposition: The patient will remain on the current unit. We will continue to monitor this patient closely.  -  Reassessment and plan follow-up will be performed by the primary team      Lexii Whitehead PA-C  [facility] RRT AMCOM Job Code Contact #[phone]    Hospital Course   Brief Summary of events leading to rapid response:   Repeat lactate vs prior slightly increased 4.1 --> 4.4. pt continues to feel in his usual state of health. No N/V, F/C, chest pain, abdominal pain, other pain or other complaints.     Admission Diagnosis:   ILD (interstitial lung disease) (H) [J84.9] **  Physical Exam   Temp: 98.2  F (36.8  C) Temp  Min: 97.4  F (36.3  C)  Max: 98.7  F (37.1  C)  Resp: 20 Resp  Min: 17  Max: 20  SpO2: 92 % SpO2  Min: 90 %  Max: 100 %  Pulse: 105 Pulse  Min: 63  Max: 110    No data recorded  BP: 126/80 Systolic (24hrs), Av , Min:125 , Max:142   Diastolic (24hrs), Av, Min:72, Max:93     I/Os: I/O last 3 completed shifts:  In: 550 [P.O.:540; I.V.:10]  Out: 2500 [Urine:2500]     Exam:   General: chronically ill appearing  Mental Status: baseline mental status.  PULM: nonlabored breathing     Significant Results and Procedures   Lactic Acid:   Recent Labs   Lab Test 21  1841 21  1620 09/15/21  0454   LACT 4.4* 4.1* 1.7     CBC:   Recent Labs   Lab Test 21  0645 09/15/21  0454 21  0439   WBC 21.6* 23.3* 22.5*   HGB 9.9* 9.8* 10.1*   HCT 31.2* 30.2* 32.8*   * 148* 108*        Sepsis Evaluation   The patient is not known to have an infection.  NO EVIDENCE OF  SEPSIS at this time.  Vital sign, physical exam, and lab findings are due to steroid taper.

## 2021-09-18 LAB
ALBUMIN SERPL-MCNC: 2.3 G/DL (ref 3.4–5)
ANION GAP SERPL CALCULATED.3IONS-SCNC: 6 MMOL/L (ref 3–14)
BASOPHILS # BLD MANUAL: 0.2 10E3/UL (ref 0–0.2)
BASOPHILS NFR BLD MANUAL: 1 %
BUN SERPL-MCNC: 12 MG/DL (ref 7–30)
CALCIUM SERPL-MCNC: 8.4 MG/DL (ref 8.5–10.1)
CHLORIDE BLD-SCNC: 109 MMOL/L (ref 94–109)
CO2 SERPL-SCNC: 26 MMOL/L (ref 20–32)
CREAT SERPL-MCNC: 0.58 MG/DL (ref 0.66–1.25)
EOSINOPHIL # BLD MANUAL: 0.7 10E3/UL (ref 0–0.7)
EOSINOPHIL NFR BLD MANUAL: 4 %
ERYTHROCYTE [DISTWIDTH] IN BLOOD BY AUTOMATED COUNT: 18 % (ref 10–15)
GFR SERPL CREATININE-BSD FRML MDRD: >90 ML/MIN/1.73M2
GLUCOSE BLD-MCNC: 88 MG/DL (ref 70–99)
GLUCOSE BLDC GLUCOMTR-MCNC: 119 MG/DL (ref 70–99)
GLUCOSE BLDC GLUCOMTR-MCNC: 141 MG/DL (ref 70–99)
GLUCOSE BLDC GLUCOMTR-MCNC: 167 MG/DL (ref 70–99)
HCT VFR BLD AUTO: 30.2 % (ref 40–53)
HGB BLD-MCNC: 9.7 G/DL (ref 13.3–17.7)
LACTATE SERPL-SCNC: 4.4 MMOL/L (ref 0.7–2)
LACTATE SERPL-SCNC: 4.9 MMOL/L (ref 0.7–2)
LYMPHOCYTES # BLD MANUAL: 1.5 10E3/UL (ref 0.8–5.3)
LYMPHOCYTES NFR BLD MANUAL: 8 %
MCH RBC QN AUTO: 29 PG (ref 26.5–33)
MCHC RBC AUTO-ENTMCNC: 32.1 G/DL (ref 31.5–36.5)
MCV RBC AUTO: 90 FL (ref 78–100)
METAMYELOCYTES # BLD MANUAL: 0.2 10E3/UL
METAMYELOCYTES NFR BLD MANUAL: 1 %
MONOCYTES # BLD MANUAL: 0.4 10E3/UL (ref 0–1.3)
MONOCYTES NFR BLD MANUAL: 2 %
MYELOCYTES # BLD MANUAL: 0.6 10E3/UL
MYELOCYTES NFR BLD MANUAL: 3 %
NEUTROPHILS # BLD MANUAL: 14.9 10E3/UL (ref 1.6–8.3)
NEUTROPHILS NFR BLD MANUAL: 81 %
PLAT MORPH BLD: ABNORMAL
PLATELET # BLD AUTO: 123 10E3/UL (ref 150–450)
POTASSIUM BLD-SCNC: 3.3 MMOL/L (ref 3.4–5.3)
POTASSIUM BLD-SCNC: 3.7 MMOL/L (ref 3.4–5.3)
RBC # BLD AUTO: 3.35 10E6/UL (ref 4.4–5.9)
RBC MORPH BLD: ABNORMAL
SODIUM SERPL-SCNC: 141 MMOL/L (ref 133–144)
WBC # BLD AUTO: 18.4 10E3/UL (ref 4–11)

## 2021-09-18 PROCEDURE — 250N000012 HC RX MED GY IP 250 OP 636 PS 637: Performed by: INTERNAL MEDICINE

## 2021-09-18 PROCEDURE — 85027 COMPLETE CBC AUTOMATED: CPT | Performed by: INTERNAL MEDICINE

## 2021-09-18 PROCEDURE — 250N000013 HC RX MED GY IP 250 OP 250 PS 637: Performed by: INTERNAL MEDICINE

## 2021-09-18 PROCEDURE — 999N000157 HC STATISTIC RCP TIME EA 10 MIN

## 2021-09-18 PROCEDURE — 120N000003 HC R&B IMCU UMMC

## 2021-09-18 PROCEDURE — 250N000011 HC RX IP 250 OP 636: Performed by: PHYSICIAN ASSISTANT

## 2021-09-18 PROCEDURE — 250N000013 HC RX MED GY IP 250 OP 250 PS 637: Performed by: STUDENT IN AN ORGANIZED HEALTH CARE EDUCATION/TRAINING PROGRAM

## 2021-09-18 PROCEDURE — 84132 ASSAY OF SERUM POTASSIUM: CPT | Performed by: STUDENT IN AN ORGANIZED HEALTH CARE EDUCATION/TRAINING PROGRAM

## 2021-09-18 PROCEDURE — 82040 ASSAY OF SERUM ALBUMIN: CPT | Performed by: PHYSICIAN ASSISTANT

## 2021-09-18 PROCEDURE — 80048 BASIC METABOLIC PNL TOTAL CA: CPT | Performed by: INTERNAL MEDICINE

## 2021-09-18 PROCEDURE — 36592 COLLECT BLOOD FROM PICC: CPT | Performed by: STUDENT IN AN ORGANIZED HEALTH CARE EDUCATION/TRAINING PROGRAM

## 2021-09-18 PROCEDURE — 250N000012 HC RX MED GY IP 250 OP 636 PS 637: Performed by: PHYSICIAN ASSISTANT

## 2021-09-18 PROCEDURE — 83605 ASSAY OF LACTIC ACID: CPT | Performed by: INTERNAL MEDICINE

## 2021-09-18 PROCEDURE — 99232 SBSQ HOSP IP/OBS MODERATE 35: CPT | Performed by: INTERNAL MEDICINE

## 2021-09-18 PROCEDURE — 250N000013 HC RX MED GY IP 250 OP 250 PS 637: Performed by: PHYSICIAN ASSISTANT

## 2021-09-18 PROCEDURE — 36592 COLLECT BLOOD FROM PICC: CPT | Performed by: INTERNAL MEDICINE

## 2021-09-18 PROCEDURE — 94660 CPAP INITIATION&MGMT: CPT

## 2021-09-18 RX ORDER — MYCOPHENOLATE MOFETIL 200 MG/ML
1000 POWDER, FOR SUSPENSION ORAL
Status: DISCONTINUED | OUTPATIENT
Start: 2021-09-19 | End: 2021-09-19

## 2021-09-18 RX ORDER — POTASSIUM CHLORIDE 750 MG/1
40 TABLET, EXTENDED RELEASE ORAL ONCE
Status: DISCONTINUED | OUTPATIENT
Start: 2021-09-18 | End: 2021-09-18

## 2021-09-18 RX ORDER — POTASSIUM CHLORIDE 750 MG/1
40 TABLET, EXTENDED RELEASE ORAL ONCE
Status: COMPLETED | OUTPATIENT
Start: 2021-09-18 | End: 2021-09-18

## 2021-09-18 RX ADMIN — MYCOPHENOLATE MOFETIL 500 MG: 500 TABLET, FILM COATED ORAL at 18:11

## 2021-09-18 RX ADMIN — INSULIN ASPART 13 UNITS: 100 INJECTION, SOLUTION INTRAVENOUS; SUBCUTANEOUS at 13:46

## 2021-09-18 RX ADMIN — HEPARIN SODIUM 5000 UNITS: 5000 INJECTION, SOLUTION INTRAVENOUS; SUBCUTANEOUS at 09:05

## 2021-09-18 RX ADMIN — POTASSIUM CHLORIDE 40 MEQ: 750 TABLET, EXTENDED RELEASE ORAL at 09:03

## 2021-09-18 RX ADMIN — ESCITALOPRAM OXALATE 5 MG: 5 TABLET, FILM COATED ORAL at 20:41

## 2021-09-18 RX ADMIN — Medication 5 MG: at 20:41

## 2021-09-18 RX ADMIN — AZITHROMYCIN MONOHYDRATE 250 MG: 250 TABLET ORAL at 09:03

## 2021-09-18 RX ADMIN — INSULIN ASPART 6 UNITS: 100 INJECTION, SOLUTION INTRAVENOUS; SUBCUTANEOUS at 18:52

## 2021-09-18 RX ADMIN — PANTOPRAZOLE SODIUM 40 MG: 40 TABLET, DELAYED RELEASE ORAL at 09:04

## 2021-09-18 RX ADMIN — MYCOPHENOLATE MOFETIL 500 MG: 500 TABLET, FILM COATED ORAL at 09:04

## 2021-09-18 RX ADMIN — LEVOTHYROXINE SODIUM 25 MCG: 0.03 TABLET ORAL at 09:04

## 2021-09-18 RX ADMIN — HEPARIN SODIUM 5000 UNITS: 5000 INJECTION, SOLUTION INTRAVENOUS; SUBCUTANEOUS at 20:41

## 2021-09-18 RX ADMIN — PREDNISONE 50 MG: 50 TABLET ORAL at 09:04

## 2021-09-18 RX ADMIN — FLUTICASONE FUROATE 1 PUFF: 100 POWDER RESPIRATORY (INHALATION) at 09:04

## 2021-09-18 RX ADMIN — CHOLESTYRAMINE 8 G: 4 POWDER, FOR SUSPENSION ORAL at 09:04

## 2021-09-18 RX ADMIN — CHOLESTYRAMINE 8 G: 4 POWDER, FOR SUSPENSION ORAL at 13:45

## 2021-09-18 RX ADMIN — AMLODIPINE BESYLATE 5 MG: 5 TABLET ORAL at 09:04

## 2021-09-18 RX ADMIN — PANTOPRAZOLE SODIUM 40 MG: 40 TABLET, DELAYED RELEASE ORAL at 18:11

## 2021-09-18 RX ADMIN — CHOLESTYRAMINE 8 G: 4 POWDER, FOR SUSPENSION ORAL at 20:42

## 2021-09-18 ASSESSMENT — ACTIVITIES OF DAILY LIVING (ADL)
ADLS_ACUITY_SCORE: 11
ADLS_ACUITY_SCORE: 13
ADLS_ACUITY_SCORE: 13
ADLS_ACUITY_SCORE: 11

## 2021-09-18 NOTE — PLAN OF CARE
Neuro: A&Ox4. Denies numbness/tingling.    Cardiac: SR. VSS.       Respiratory: Sating 92% on 45-50% on HFNC while awake, 40% bipap while asleep. Denies new SOB, dyspneic with exertion. Clear/diminished lung sounds.   GI/: Adequate leena urine output, no BM, No N/V.   Diet/appetite: Tolerating regular diet.   Activity:  Assist of 1, did not ambulate overnight.   Pain: Denies pain.   Skin: No new deficits noted. Redness to scrotum and bridge of nose.   LDA's: R triple PICC flushed with blood return, saline locked.      Plan: Continue with POC. Notify primary team with changes.

## 2021-09-18 NOTE — PROGRESS NOTES
[facility]    Progress Note    Date of Service (when I saw the patient): 09/18/2021    Assessment & Plan   Edson Thornton is a 56 year old male with history of NSIP associated with rheumatoid arthritis and traction bronchiectasis who was admitted on 9/5/2021 with acute on chronic hypoxic respiratory failure likely from ILD. Weaning steroids now in preparation for transplant.     1. Acute hypoxic respiratory failure  secondary to progressive ILD, all are POA  Although the patient had tachypnea and leukocytosis on 9/16/2021, sepsis has been effectively ruled out.   -Ordered 2 view chest x-ray for a.m.  -Appreciate pulm consult  -Continue BiPAP at night, HFNC during day  -Continue azithromycin as an anti-inflammatory  -Continue prednisone taper based on recommendations of pulmonology service  -Increased MMF to 1000 mg twice daily based on recommendations of pulmonology service    2.  Lung transplant candidacy  -Awaiting a spot in the schedule for OR for dental surgery for teeth extraction prior to transplant.    -Although the patient had indeterminate quanteferon gold, this was deemed to be low risk for active or latent tuberculosis based on evaluation of transplant infectious disease.  -The patient received ivermectin at 15 mg X1 dose for treatment of possible stronglydies based on recommendations of transplant ID    3.  Vitamin D deficiency, POA  -Continue vitamin D replacement    4. Rheumatoid arthritis, all are POA  Patient seen by rheumatology this admission. No acute flare of rheumatoid arthritis. SINCERE antibodies negative, flow cytometry negative for CD19 cells, CK normal, aldolase normal. Anti-Irene-1 negative. The patient was treated with leflunomide from 5/21 to 7/21. Rheumatology considers that leflunomide toxicity might contribute to the patient's ILD. -Continue cholestyramine TID to increase leflunomide clearance.      5. 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 chronic medical problems  Hypothyroidism   Continue 25 mg levothyroxin qday     SALTY  Continue BiPAP at night.      Hypertension  On 5mg amlodipine, BP well controlled today.     Anxiety  Depression  Continue 5mg escitalopram        DVT Prophylaxis: UFH 5000U, q12h  Code Status: Full Code     Disposition: Awaiting lung transplant  Jordan Sears    Interval History   The patient continues to deny any new change in his shortness of breath.  No reported new chest pain or cough.  Four-point review of systems is otherwise negative    -Data reviewed today: I reviewed all new labs and imaging results over the last 24 hours.    Physical Exam   Temp: 98.3  F (36.8  C) Temp src: Axillary BP: 125/75 Pulse: 62   Resp: 22 SpO2: 98 % O2 Device: High Flow Nasal Cannula (HFNC) Oxygen Delivery: 30 LPM  Vitals:    09/15/21 0650 09/16/21 0439 09/17/21 0504   Weight: 65 kg (143 lb 4.8 oz) 68.1 kg (150 lb 2.1 oz) 68.3 kg (150 lb 9.2 oz)     Vital Signs with Ranges  Temp:  [97.7  F (36.5  C)-98.3  F (36.8  C)] 98.3  F (36.8  C)  Pulse:  [] 62  Resp:  [20-26] 22  BP: (125-145)/(75-88) 125/75  FiO2 (%):  [40 %-50 %] 50 %  SpO2:  [93 %-100 %] 98 %  I/O last 3 completed shifts:  In: 1150 [P.O.:1120; I.V.:30]  Out: 925 [Urine:925]    Constitutional:   Increased work of breathing, AAOx3, NAD  Respiratory: On high flow nc oxygen, coarse breathing sounds with diffuse crackles on both lung fields  Cardiovascular: RRR, no r/m/g, S1, S2  GI: nontender  Skin/Integumen: warm, no jaundice, mild edema at the level of the ankles.    Medications     - MEDICATION INSTRUCTIONS -       - MEDICATION INSTRUCTIONS -         amLODIPine  5 mg Oral Daily     azithromycin  250 mg Oral Daily     cholestyramine  2 packet Oral TID     escitalopram  5 mg Oral QPM     fluticasone  1 puff Inhalation Daily     heparin ANTICOAGULANT  5,000 Units Subcutaneous  Q12H     insulin aspart   Subcutaneous TID AC     insulin aspart  1-7 Units Subcutaneous TID AC     insulin aspart  1-5 Units Subcutaneous At Bedtime     insulin glargine  8 Units Subcutaneous QAM AC     levothyroxine  25 mcg Oral QAM AC     melatonin  5 mg Oral At Bedtime     [START ON 9/19/2021] mycophenolate  1,000 mg Oral BID IS     mycophenolate  500 mg Oral BID IS     pantoprazole  40 mg Oral BID AC     potassium chloride  40 mEq Oral Once     predniSONE  50 mg Oral Daily    Followed by     [START ON 9/21/2021] predniSONE  40 mg Oral Daily    Followed by     [START ON 9/24/2021] predniSONE  30 mg Oral Daily    Followed by     [START ON 9/27/2021] predniSONE  20 mg Oral Daily     sodium chloride (PF)  3 mL Intracatheter Q8H     sodium chloride (PF)  3 mL Intracatheter Q8H     sulfamethoxazole-trimethoprim  1 tablet Oral Once per day on Mon Wed Fri     vitamin D2  50,000 Units Oral Q7 Days       Data   Recent Labs   Lab 09/18/21  0526 09/17/21  2214 09/17/21  1719 09/17/21  1308 09/17/21  1149 09/17/21  0828 09/17/21  0441 09/16/21  0825 09/16/21  0645   WBC 18.4*  --   --   --   --   --  20.2*  --  21.6*   HGB 9.7*  --   --   --   --   --  10.0*  --  9.9*   MCV 90  --   --   --   --   --  89  --  90   *  --   --   --   --   --  95*  --  120*     --   --   --   --   --  138  --  138   POTASSIUM 3.3*  --   --   --  3.8  --  3.3*   < > 3.5   CHLORIDE 109  --   --   --   --   --  106  --  107   CO2 26  --   --   --   --   --  28  --  25   BUN 12  --   --   --   --   --  9  --  14   CR 0.58*  --   --   --   --   --  0.59*  --  0.61*   ANIONGAP 6  --   --   --   --   --  4  --  6   ERIK 8.4*  --   --   --   --   --  8.7  --  8.7   GLC 88 160* 174*   < >  --    < > 78   < > 95    < > = values in this interval not displayed.       No results found for this or any previous visit (from the past 24 hour(s)).

## 2021-09-18 NOTE — CODE/RAPID RESPONSE
Rapid Response Team Note    Assessment   In assessment a rapid response was called on Edson Thornton due to lactic acidosis. This presentation is likely due to steroid taper, GAIL/LABAs and worsened by Chronic immunosuppression  RA.     Plan   -  Continue to monitor infection workup as previously sent  - Continue to monitor Vitals   - no indication for IV fluids or antibiotics at this time  -  The [place] [address] internal medicine primary team was at the bedside at the time of the RRT call and agree with the plan.  -  Disposition: The patient will remain on the current unit. We will continue to monitor this patient closely.  -  Reassessment and plan follow-up will be performed by the primary team      Lexii Whitehead PA-C  [facility] RRT AMCOM Job Code Contact #[phone]    Hospital Course   Brief Summary of events leading to rapid response:   Follow up lactate to prior this afternoon continues to be >4 (4.9 at 12:18, 4.4 at 15:57)    Pt denies any recent changes and is meeting with his primary medicine team who are discussing facets of lung transplantation    Admission Diagnosis:   ILD (interstitial lung disease) (H) [J84.9] *    Physical Exam   Temp: 97.5  F (36.4  C) Temp  Min: 97.5  F (36.4  C)  Max: 98.3  F (36.8  C)  Resp: 17 Resp  Min: 17  Max: 26  SpO2: 100 % SpO2  Min: 93 %  Max: 100 %  Pulse: 94 Pulse  Min: 62  Max: 110    No data recorded  BP: (!) 145/81 Systolic (24hrs), Av , Min:125 , Max:145   Diastolic (24hrs), Av, Min:75, Max:88     I/Os: I/O last 3 completed shifts:  In: 570 [P.O.:540; I.V.:30]  Out: 1425 [Urine:1425]     Exam:   General: chronically ill appearing  Mental Status: baseline mental status.  CV: RRR no m/r/g  PULM: CTAB, nonlabored    Significant Results and Procedures   Lactic Acid:   Recent Labs   Lab Test 21  1557 21  1218 21  2038   LACT 4.4* 4.9* 2.3*     CBC:   Recent Labs   Lab Test 21  0526 21  0441 21  0645   WBC 18.4* 20.2* 21.6*   HGB  9.7* 10.0* 9.9*   HCT 30.2* 30.7* 31.2*   * 95* 120*        Sepsis Evaluation   The patient is not known to have an infection.  NO EVIDENCE OF SEPSIS at this time.  Vital sign, physical exam, and lab findings are due to ILD, GAIL/LABA and steroid therapies.

## 2021-09-18 NOTE — PROGRESS NOTES
09/18/21 1400   Call Information   Date of Call 09/18/21   Time of Call 1310   Name of person requesting the team Rema   Title of person requesting team RN   RRT Arrival time 1315   Time RRT ended 1330   Reason for call   Type of RRT Adult   Primary reason for call Sepsis suspected   Sepsis Suspected Elevated Lactate level;WBC <4 or >12   Was patient transferred from the ED, ICU, or PACU within last 24 hours prior to RRT call? No   SBAR   Situation LA 4.9   Background  PMH ILD and rheumatoid lung disease, RA, SALTY, hypothyroid, HTN,anxiety and depression, HLD, duodenal anomaly admitted on 9/5/2021 in transfer for lung transplant workup and risk of need for ECMO.    Notable History/Conditions End-Stage disease;Hypertension   Assessment pt is alert and oriented. AVSS.    Interventions Labs   Patient Outcome   Patient Outcome Stabilized on unit   RRT Team   Attending/Primary/Covering Physician Gold 10   Date Attending Physician notified 09/18/21   Time Attending Physician notified 1310   Physician(s) Cathy BOSS   Lead TIFFANY Hodgson   RT n/a   Post RRT Intervention Assessment   Post RRT Assessment Stable/Improved   Date Follow Up Done 09/18/21   Time Follow Up Done 1615   Comments RRT called for JENNIFER hickey

## 2021-09-18 NOTE — PROGRESS NOTES
[facility]   Pulmonary   Progress Note  Edson Thornton MRN: [id number]  1965  Date of Admission:9/5/2021  Date of Service: 09/18/2021        Assessment & Plan    56-year-old male (BMI 27.6) with history of NSIP/ILD, RA (status post Rituxan infusion, leflunomide), transferred from [facility] for acute on chronic hypoxemic respiratory failure refractory to treatment. He is approved for listing by the transplant evaluation committee pending tapering of his steroids to 20 mg/day or less prior to becoming active on the transplant list.     1. Acute on chronic hypoxemic respiratory failure  2. NSIP-ILD  3. Rheumatoid arthritis (positive anti-CCP, RF)     Discussion:   Patient continues to have stable O2 requirements with slow tapering of his steroids after initiating mycophenolate treatment. Plan is to taper steroids by 10 mg every 3 days while his mycophenolate levels become therapeutic in the next 2 weeks. Will continue to trend CRP to ensure that he is immunosuppressed while his steroid taper is decreasing over the next 2 weeks.      Recommendations    - Prednisone 50 mg x 3 days, 30 mg x 3 days, 20 mg daily  -Trend CRP q48H  -Plan to increase mycophenolate to 1000 mg BID beginning 9/19     We will continue to follow.     Patient seen & discussed w/  Dr. Nicolas.     Faisal Caban MD   Pulmonary/Critical Care Fellow   Pager #[phone]       Attending Note:  Patient seen, examined, and discussed with Dr. Caban.  All data reviewed.  Agree with the assessment and plan as outlined in the above note.  ILD secondary to RA flare, improving on steroids.  Initiated on Mycophenolate with plans to double dose after one week of therapy (will be this weekend); continue steroid taper, can likely accelerate taper after being on mycophenolate for two weeks.  Pulmonary transplant following.    Yuki Nicolas MD  [phone]        Interval History      RN notes reviewed, no acute events overnight. Patient reports he feels  unchanged compared to yesterday, though overall states his breathing is better. He continues to partake in physical therapy exercises; notes that his appetite is near his baseline.    No fevers, chills, chest pain, cough, hemoptysis, sore throat, night sweats.    Five-point ROS is negative except for what is noted in the interval history.    Physical Exam Temp:  [97.7  F (36.5  C)-98.3  F (36.8  C)] 98.3  F (36.8  C)  Pulse:  [] 62  Resp:  [20-26] 22  BP: (125-145)/(75-88) 125/75  FiO2 (%):  [40 %-70 %] 40 %  SpO2:  [93 %-100 %] 98 %  I/O last 3 completed shifts:  In: 1150 [P.O.:1120; I.V.:30]  Out: 925 [Urine:925]  Wt Readings from Last 1 Encounters:   09/17/21 68.3 kg (150 lb 9.2 oz)    Body mass index is 25.85 kg/m . FiO2 (%): 40 %  Resp: 22      Exam:  General:  On HFNC, cooperative, NAD.  HEENT: Anicteric sclera. EOMI.  Lungs: Bibasilar crackles; speaking in full sentences on HFNC 50% O2.   Abd: Soft, NT, ND  Ext: WWP,  No LE edema  Skin: Plethoric appearing face, otherwise no cyanosis, or jaundice  Neuro: AAOx3, no focal deficits    Data   Labs: reviewed in EMR and notable labs listed below.  CBC RESULTS: Recent Labs   Lab Test 09/15/21  0454   WBC 23.3*   RBC 3.42*   HGB 9.8*   HCT 30.2*   MCV 88   MCH 28.7   MCHC 32.5   RDW 17.2*   *           Imaging: reviewed in EMR and notable imaging listed below.    Chest CT high resolution 9/7/21:  IMPRESSION:   1. Extensive bilateral groundglass and reticular opacities are  slightly increased compared to chest CT from 8/30/2021. This likely  represents progression of known NSIP, however superimposed infection  or cannot be excluded.  2. Small bilateral pleural effusions.  3. Stable mediastinal lymphadenopathy, likely reactive.     Medications     amLODIPine  5 mg Oral Daily     azithromycin  250 mg Oral Daily     cholestyramine  2 packet Oral TID     escitalopram  5 mg Oral QPM     fluticasone  1 puff Inhalation Daily     heparin ANTICOAGULANT  5,000  Units Subcutaneous Q12H     insulin aspart   Subcutaneous TID AC     insulin aspart  1-7 Units Subcutaneous TID AC     insulin aspart  1-5 Units Subcutaneous At Bedtime     insulin glargine  8 Units Subcutaneous QAM AC     levothyroxine  25 mcg Oral QAM AC     melatonin  5 mg Oral At Bedtime     mycophenolate  500 mg Oral BID IS     pantoprazole  40 mg Oral BID AC     potassium chloride  40 mEq Oral Once     predniSONE  50 mg Oral Daily    Followed by     [START ON 9/21/2021] predniSONE  40 mg Oral Daily    Followed by     [START ON 9/24/2021] predniSONE  30 mg Oral Daily    Followed by     [START ON 9/27/2021] predniSONE  20 mg Oral Daily     sodium chloride (PF)  3 mL Intracatheter Q8H     sodium chloride (PF)  3 mL Intracatheter Q8H     sulfamethoxazole-trimethoprim  1 tablet Oral Once per day on Mon Wed Fri     vitamin D2  50,000 Units Oral Q7 Days

## 2021-09-18 NOTE — CODE/RAPID RESPONSE
Rapid Response Team Note    Assessment   In assessment a rapid response was called on Edson Thornton due to lactic acidosis. This presentation is likely due to recent lactic acidosis (4.4 yesterday) and worsened by Hypertension  Chronic immunosuppression  RA, nebulizers.     Plan   -  Continue to monitor, good po intake, no further fluids  - ivermectin given per primary team   -  The Internal Medicine primary team was able to be reached and they are in agreement with the above plan.  -  Disposition: The patient will remain on the current unit. We will continue to monitor this patient closely.  -  Reassessment and plan follow-up will be performed by the rapid response team      Lexii Whitehead PA-C  [facility] RRT AMCOM Job Code Contact #[phone]    Hospital Course   Brief Summary of events leading to rapid response:   HTN, tachycardia triggered RN-guided lactate draw that returns 2.3 (see also RRT from yesterday)    Pt is here for lung transplant workup.  Received ivermectin today to cover for strongyloidies and continues on Azithromycin.     Denies pain including in the abdomen.   Denies dizziness/lightheadedness, fevers, chills   Denies bleeding   Denies chest pain, sob, ochoa in cough  Denies  new rashes, lumps lesions  Denies urinary complaints  Denies bowel complaints  Denies other complaints       Admission Diagnosis:   ILD (interstitial lung disease) (H) [J84.9] *    Physical Exam   Temp: 98  F (36.7  C) Temp  Min: 97.4  F (36.3  C)  Max: 98  F (36.7  C)  Resp: 20 Resp  Min: 17  Max: 20  SpO2: 93 % SpO2  Min: 91 %  Max: 96 %  Pulse: 110 Pulse  Min: 61  Max: 110    No data recorded  BP: (!) 142/88 Systolic (24hrs), Av , Min:127 , Max:145   Diastolic (24hrs), Av, Min:77, Max:88     I/Os: I/O last 3 completed shifts:  In:  [P.O.:1445; IV Piggyback:500]  Out:  [Urine:]     Exam:   General: chronically ill appearing  Mental Status: baseline mental status.  CV: RRR, no m/r/g  PULM: CTAB,  nonlabored breathing    Significant Results and Procedures   Lactic Acid:   Recent Labs   Lab Test 09/17/21  2038 09/17/21  0058 09/16/21  1841   LACT 2.3* 1.9 4.4*     CBC:   Recent Labs   Lab Test 09/17/21  0441 09/16/21  0645 09/15/21  0454   WBC 20.2* 21.6* 23.3*   HGB 10.0* 9.9* 9.8*   HCT 30.7* 31.2* 30.2*   PLT 95* 120* 148*        Sepsis Evaluation   The patient is not known to have an infection.  NO EVIDENCE OF SEPSIS at this time.  Vital sign, physical exam, and lab findings are due to recent lactic acidosis, steroid therapy, GAIL/LABA.

## 2021-09-18 NOTE — PLAN OF CARE
Neuro: A&Ox4.   Cardiac: SR/ST HR  VSS.   Respiratory: Sating 96% on 40-45% HFNC  GI/: Adequate urine output. BM X2  Diet/appetite: Tolerating regular diet. Eating well.  Activity:  Assist of 1 up to chair   Pain: At acceptable level on current regimen.   Skin: Groin is pink  LDA's: Right PICC    Plan: Continue with POC. Notify primary team with changes.      Lactic acid 4.9 and 4.4 today. Code sepsis called both times. No intervention. Potassium replaced.

## 2021-09-18 NOTE — PLAN OF CARE
Neuro: A&Ox4. Wears glasses.   Cardiac: SR/ST 90 -100's. VSS.   Respiratory: Sating >90% on 45- 50% BiPAP overnight. HFNC during the day.   GI/: Adequate urine output via urinal. No BM   Diet/appetite: Tolerating regular diet. Reports eating well during the day.   Activity:  Assist of 1, up to commode. Short of breath with exertion.   Pain: Denies  Skin: no new deficits noted   LDA's: Right PICC saline locked. Flushed with blood return.   Plan: Code sepsis called x1 for lactic of 2.3,  No changes made to current plan. Continue with POC. Notify primary team with changes.

## 2021-09-18 NOTE — CODE/RAPID RESPONSE
Rapid Response Team Note    Assessment   In assessment a rapid response was called on Edson Thornton due to lactic acidosis. This presentation is likely due to hypoxic respiratory failure 2/2 ILD  and worsened by steroid use, hypertension.     Plan   -  Etiology of fluctuating lactic acidosis uncertain.  Overall, patient is improving clinically with decreasing oxygen requirements and increased exercise tolerance.  Potentially 2/2 steroid use vs. Other.   -Obtain albumin level to further determine fluid administration (IV fluids vs albumin)   -Will continue to closely monitor.   -  The Internal Medicine primary team was able to be reached and they are in agreement with the above plan.  -  Disposition: The patient will remain on the current unit. We will continue to monitor this patient closely.  -  Reassessment and plan follow-up will be performed by the primary team      Cathy Carrera PA-C  [facility] RRT [facility] Job Code Contact #[phone]    Hospital Course   Brief Summary of events leading to rapid response:   Per Internal Medicine Progress Note: Edson Thornton is a 56 year old male with history of NSIP associated with rheumatoid arthritis and traction bronchiectasis who was admitted on 2021 with acute on chronic hypoxic respiratory failure likely from ILD. Weaning steroids now in preparation for transplant.    Rapid response called for lactic acid of 4.9.     Admission Diagnosis:   ILD (interstitial lung disease) (H) [J84.9]     Physical Exam   Temp: 98.3  F (36.8  C) Temp  Min: 97.9  F (36.6  C)  Max: 98.3  F (36.8  C)  Resp: 22 Resp  Min: 20  Max: 26  SpO2: 98 % SpO2  Min: 93 %  Max: 100 %  Pulse: 62 Pulse  Min: 62  Max: 110    No data recorded  BP: 125/75 Systolic (24hrs), Av , Min:125 , Max:145   Diastolic (24hrs), Av, Min:75, Max:88     I/Os: I/O last 3 completed shifts:  In: 1150 [P.O.:1120; I.V.:30]  Out: 925 [Urine:925]     Exam:   GENERAL: Alert and oriented x 3. NAD. Ambulatory.  Cooperative.   HEENT: Anicteric sclera. Mucous membranes moist. NC. AT.   CV: RRR. S1, S2. No murmurs appreciated.   RESPIRATORY: Effort normal on HFNC. Lung sounds distant, no appreciable wheezes.   GI: Abdomen soft and non distended with normoactive bowel sounds present in all quadrants. No tenderness, rebound, guarding. No lesions.   NEUROLOGICAL: No focal deficits. Moves all extremities.  CN 2-12 grossly intact.  EXTREMITIES: No peripheral edema. Intact bilateral pedal pulses.   SKIN: No jaundice. No rashes.        Significant Results and Procedures   Lactic Acid:   Recent Labs   Lab Test 09/18/21  1218 09/17/21  2038 09/17/21  0058   LACT 4.9* 2.3* 1.9     CBC:   Recent Labs   Lab Test 09/18/21  0526 09/17/21  0441 09/16/21  0645   WBC 18.4* 20.2* 21.6*   HGB 9.7* 10.0* 9.9*   HCT 30.2* 30.7* 31.2*   * 95* 120*        Sepsis Evaluation   The patient is not known to have an infection.  NO EVIDENCE OF SEPSIS at this time.  Vital sign, physical exam, and lab findings are due to ILD, steroid use.

## 2021-09-19 ENCOUNTER — APPOINTMENT (OUTPATIENT)
Dept: GENERAL RADIOLOGY | Facility: CLINIC | Age: 56
End: 2021-09-19
Attending: INTERNAL MEDICINE
Payer: COMMERCIAL

## 2021-09-19 LAB
ALBUMIN SERPL-MCNC: 2.2 G/DL (ref 3.4–5)
ALP SERPL-CCNC: 105 U/L (ref 40–150)
ALT SERPL W P-5'-P-CCNC: 74 U/L (ref 0–70)
ANION GAP SERPL CALCULATED.3IONS-SCNC: 5 MMOL/L (ref 3–14)
AST SERPL W P-5'-P-CCNC: 26 U/L (ref 0–45)
BASE EXCESS BLDV CALC-SCNC: 4.2 MMOL/L (ref -7.7–1.9)
BASOPHILS # BLD AUTO: 0.1 10E3/UL (ref 0–0.2)
BASOPHILS # BLD MANUAL: 0 10E3/UL (ref 0–0.2)
BASOPHILS NFR BLD AUTO: 0 %
BASOPHILS NFR BLD MANUAL: 0 %
BILIRUB DIRECT SERPL-MCNC: <0.1 MG/DL (ref 0–0.2)
BILIRUB SERPL-MCNC: 0.2 MG/DL (ref 0.2–1.3)
BUN SERPL-MCNC: 12 MG/DL (ref 7–30)
CALCIUM SERPL-MCNC: 8.6 MG/DL (ref 8.5–10.1)
CHLORIDE BLD-SCNC: 109 MMOL/L (ref 94–109)
CO2 SERPL-SCNC: 26 MMOL/L (ref 20–32)
CREAT SERPL-MCNC: 0.52 MG/DL (ref 0.66–1.25)
CRP SERPL-MCNC: 8.2 MG/L (ref 0–8)
DACRYOCYTES BLD QL SMEAR: SLIGHT
EOSINOPHIL # BLD AUTO: 0.2 10E3/UL (ref 0–0.7)
EOSINOPHIL # BLD MANUAL: 0.4 10E3/UL (ref 0–0.7)
EOSINOPHIL NFR BLD AUTO: 1 %
EOSINOPHIL NFR BLD MANUAL: 2 %
ERYTHROCYTE [DISTWIDTH] IN BLOOD BY AUTOMATED COUNT: 18.3 % (ref 10–15)
ERYTHROCYTE [DISTWIDTH] IN BLOOD BY AUTOMATED COUNT: 18.4 % (ref 10–15)
GFR SERPL CREATININE-BSD FRML MDRD: >90 ML/MIN/1.73M2
GLUCOSE BLD-MCNC: 127 MG/DL (ref 70–99)
GLUCOSE BLDC GLUCOMTR-MCNC: 143 MG/DL (ref 70–99)
GLUCOSE BLDC GLUCOMTR-MCNC: 153 MG/DL (ref 70–99)
GLUCOSE BLDC GLUCOMTR-MCNC: 84 MG/DL (ref 70–99)
HCO3 BLDV-SCNC: 29 MMOL/L (ref 21–28)
HCT VFR BLD AUTO: 30.3 % (ref 40–53)
HCT VFR BLD AUTO: 30.7 % (ref 40–53)
HCV RNA SERPL NAA+PROBE-ACNC: NOT DETECTED IU/ML
HGB BLD-MCNC: 10.2 G/DL (ref 13.3–17.7)
HGB BLD-MCNC: 9.8 G/DL (ref 13.3–17.7)
IMM GRANULOCYTES # BLD: 1 10E3/UL
IMM GRANULOCYTES NFR BLD: 4 %
INR PPP: 0.95 (ref 0.85–1.15)
LACTATE SERPL-SCNC: 1.9 MMOL/L (ref 0.7–2)
LDH SERPL L TO P-CCNC: 423 U/L (ref 85–227)
LYMPHOCYTES # BLD AUTO: 0.5 10E3/UL (ref 0.8–5.3)
LYMPHOCYTES # BLD MANUAL: 2.6 10E3/UL (ref 0.8–5.3)
LYMPHOCYTES NFR BLD AUTO: 2 %
LYMPHOCYTES NFR BLD MANUAL: 13 %
MCH RBC QN AUTO: 29.1 PG (ref 26.5–33)
MCH RBC QN AUTO: 29.7 PG (ref 26.5–33)
MCHC RBC AUTO-ENTMCNC: 32.3 G/DL (ref 31.5–36.5)
MCHC RBC AUTO-ENTMCNC: 33.2 G/DL (ref 31.5–36.5)
MCV RBC AUTO: 90 FL (ref 78–100)
MCV RBC AUTO: 90 FL (ref 78–100)
MONOCYTES # BLD AUTO: 0.3 10E3/UL (ref 0–1.3)
MONOCYTES # BLD MANUAL: 0.6 10E3/UL (ref 0–1.3)
MONOCYTES NFR BLD AUTO: 2 %
MONOCYTES NFR BLD MANUAL: 3 %
MYELOCYTES # BLD MANUAL: 0.4 10E3/UL
MYELOCYTES NFR BLD MANUAL: 2 %
NEUTROPHILS # BLD AUTO: 19.6 10E3/UL (ref 1.6–8.3)
NEUTROPHILS # BLD MANUAL: 16.2 10E3/UL (ref 1.6–8.3)
NEUTROPHILS NFR BLD AUTO: 91 %
NEUTROPHILS NFR BLD MANUAL: 80 %
NRBC # BLD AUTO: 0 10E3/UL
NRBC BLD AUTO-RTO: 0 /100
O2/TOTAL GAS SETTING VFR VENT: 40 %
PCO2 BLDV: 43 MM HG (ref 40–50)
PH BLDV: 7.44 [PH] (ref 7.32–7.43)
PLAT MORPH BLD: ABNORMAL
PLATELET # BLD AUTO: 83 10E3/UL (ref 150–450)
PLATELET # BLD AUTO: 95 10E3/UL (ref 150–450)
PO2 BLDV: 37 MM HG (ref 25–47)
POTASSIUM BLD-SCNC: 3.4 MMOL/L (ref 3.4–5.3)
PROT SERPL-MCNC: 5.2 G/DL (ref 6.8–8.8)
RBC # BLD AUTO: 3.37 10E6/UL (ref 4.4–5.9)
RBC # BLD AUTO: 3.43 10E6/UL (ref 4.4–5.9)
RBC MORPH BLD: ABNORMAL
RETICS # AUTO: 0.18 10E6/UL (ref 0.03–0.1)
RETICS/RBC NFR AUTO: 5.2 % (ref 0.5–2)
SODIUM SERPL-SCNC: 140 MMOL/L (ref 133–144)
WBC # BLD AUTO: 20.2 10E3/UL (ref 4–11)
WBC # BLD AUTO: 21.6 10E3/UL (ref 4–11)

## 2021-09-19 PROCEDURE — 85045 AUTOMATED RETICULOCYTE COUNT: CPT | Performed by: INTERNAL MEDICINE

## 2021-09-19 PROCEDURE — 120N000003 HC R&B IMCU UMMC

## 2021-09-19 PROCEDURE — 83605 ASSAY OF LACTIC ACID: CPT | Performed by: INTERNAL MEDICINE

## 2021-09-19 PROCEDURE — 83010 ASSAY OF HAPTOGLOBIN QUANT: CPT | Performed by: INTERNAL MEDICINE

## 2021-09-19 PROCEDURE — 80053 COMPREHEN METABOLIC PANEL: CPT | Performed by: INTERNAL MEDICINE

## 2021-09-19 PROCEDURE — 82248 BILIRUBIN DIRECT: CPT | Performed by: INTERNAL MEDICINE

## 2021-09-19 PROCEDURE — 85025 COMPLETE CBC W/AUTO DIFF WBC: CPT | Performed by: INTERNAL MEDICINE

## 2021-09-19 PROCEDURE — 250N000013 HC RX MED GY IP 250 OP 250 PS 637: Performed by: STUDENT IN AN ORGANIZED HEALTH CARE EDUCATION/TRAINING PROGRAM

## 2021-09-19 PROCEDURE — 250N000011 HC RX IP 250 OP 636: Performed by: PHYSICIAN ASSISTANT

## 2021-09-19 PROCEDURE — 85610 PROTHROMBIN TIME: CPT | Performed by: INTERNAL MEDICINE

## 2021-09-19 PROCEDURE — 71046 X-RAY EXAM CHEST 2 VIEWS: CPT | Mod: 26 | Performed by: STUDENT IN AN ORGANIZED HEALTH CARE EDUCATION/TRAINING PROGRAM

## 2021-09-19 PROCEDURE — 999N000157 HC STATISTIC RCP TIME EA 10 MIN

## 2021-09-19 PROCEDURE — 94660 CPAP INITIATION&MGMT: CPT

## 2021-09-19 PROCEDURE — 250N000012 HC RX MED GY IP 250 OP 636 PS 637: Performed by: INTERNAL MEDICINE

## 2021-09-19 PROCEDURE — 999N000215 HC STATISTIC HFNC ADULT NON-CPAP

## 2021-09-19 PROCEDURE — 36592 COLLECT BLOOD FROM PICC: CPT | Performed by: INTERNAL MEDICINE

## 2021-09-19 PROCEDURE — 250N000013 HC RX MED GY IP 250 OP 250 PS 637: Performed by: PHYSICIAN ASSISTANT

## 2021-09-19 PROCEDURE — 99233 SBSQ HOSP IP/OBS HIGH 50: CPT | Performed by: INTERNAL MEDICINE

## 2021-09-19 PROCEDURE — 86140 C-REACTIVE PROTEIN: CPT | Performed by: STUDENT IN AN ORGANIZED HEALTH CARE EDUCATION/TRAINING PROGRAM

## 2021-09-19 PROCEDURE — 250N000013 HC RX MED GY IP 250 OP 250 PS 637: Performed by: INTERNAL MEDICINE

## 2021-09-19 PROCEDURE — 82803 BLOOD GASES ANY COMBINATION: CPT | Performed by: INTERNAL MEDICINE

## 2021-09-19 PROCEDURE — 99232 SBSQ HOSP IP/OBS MODERATE 35: CPT | Mod: 24 | Performed by: INTERNAL MEDICINE

## 2021-09-19 PROCEDURE — 85060 BLOOD SMEAR INTERPRETATION: CPT | Performed by: STUDENT IN AN ORGANIZED HEALTH CARE EDUCATION/TRAINING PROGRAM

## 2021-09-19 PROCEDURE — 71046 X-RAY EXAM CHEST 2 VIEWS: CPT

## 2021-09-19 PROCEDURE — 83615 LACTATE (LD) (LDH) ENZYME: CPT | Performed by: INTERNAL MEDICINE

## 2021-09-19 RX ORDER — MYCOPHENOLATE MOFETIL 500 MG/1
1000 TABLET ORAL
Status: DISCONTINUED | OUTPATIENT
Start: 2021-09-19 | End: 2021-09-30

## 2021-09-19 RX ORDER — POTASSIUM CHLORIDE 750 MG/1
40 TABLET, EXTENDED RELEASE ORAL ONCE
Status: COMPLETED | OUTPATIENT
Start: 2021-09-19 | End: 2021-09-19

## 2021-09-19 RX ADMIN — HEPARIN SODIUM 5000 UNITS: 5000 INJECTION, SOLUTION INTRAVENOUS; SUBCUTANEOUS at 09:26

## 2021-09-19 RX ADMIN — AMLODIPINE BESYLATE 5 MG: 5 TABLET ORAL at 09:25

## 2021-09-19 RX ADMIN — PANTOPRAZOLE SODIUM 40 MG: 40 TABLET, DELAYED RELEASE ORAL at 09:25

## 2021-09-19 RX ADMIN — MYCOPHENOLATE MOFETIL 1000 MG: 500 TABLET, FILM COATED ORAL at 16:55

## 2021-09-19 RX ADMIN — LEVOTHYROXINE SODIUM 25 MCG: 0.03 TABLET ORAL at 09:26

## 2021-09-19 RX ADMIN — PANTOPRAZOLE SODIUM 40 MG: 40 TABLET, DELAYED RELEASE ORAL at 16:53

## 2021-09-19 RX ADMIN — INSULIN ASPART 8 UNITS: 100 INJECTION, SOLUTION INTRAVENOUS; SUBCUTANEOUS at 10:19

## 2021-09-19 RX ADMIN — INSULIN ASPART 5 UNITS: 100 INJECTION, SOLUTION INTRAVENOUS; SUBCUTANEOUS at 16:52

## 2021-09-19 RX ADMIN — AZITHROMYCIN MONOHYDRATE 250 MG: 250 TABLET ORAL at 09:26

## 2021-09-19 RX ADMIN — CHOLESTYRAMINE 8 G: 4 POWDER, FOR SUSPENSION ORAL at 14:21

## 2021-09-19 RX ADMIN — ESCITALOPRAM OXALATE 5 MG: 5 TABLET, FILM COATED ORAL at 20:35

## 2021-09-19 RX ADMIN — PREDNISONE 50 MG: 50 TABLET ORAL at 09:25

## 2021-09-19 RX ADMIN — MYCOPHENOLATE MOFETIL 1000 MG: 500 TABLET, FILM COATED ORAL at 09:25

## 2021-09-19 RX ADMIN — POTASSIUM CHLORIDE 40 MEQ: 750 TABLET, EXTENDED RELEASE ORAL at 09:36

## 2021-09-19 RX ADMIN — Medication 5 MG: at 20:35

## 2021-09-19 RX ADMIN — FLUTICASONE FUROATE 1 PUFF: 100 POWDER RESPIRATORY (INHALATION) at 09:26

## 2021-09-19 RX ADMIN — CHOLESTYRAMINE 8 G: 4 POWDER, FOR SUSPENSION ORAL at 22:35

## 2021-09-19 ASSESSMENT — ACTIVITIES OF DAILY LIVING (ADL)
ADLS_ACUITY_SCORE: 11

## 2021-09-19 ASSESSMENT — MIFFLIN-ST. JEOR: SCORE: 1396

## 2021-09-19 NOTE — PLAN OF CARE
Neuro: A&Ox4.   Cardiac: SR/ST HR  VSS.   Respiratory: Sating 95% on 45% HFNC  GI/: Adequate urine output. BM X1  Diet/appetite: Tolerating regular diet. Eating well.  Activity:  Assist of 1, up to chair and in halls.  Pain: At acceptable level on current regimen.   Skin: No new deficits noted.  LDA's: Right PICC    Plan: Continue with POC. Notify primary team with changes.

## 2021-09-19 NOTE — PROGRESS NOTES
Major Shift Events:  Clustered care done during the night to promote sleeping . Hemodynamically stable. Afebrile. On HFNC FIO2  40% with 30 lpm during the day . BIPAP FIO2 40% during the night . Voided spontaneously , had one BM during the shift .   Plan: continue to monitor and report any concerns.   For vital signs and complete assessments, please see documentation flowsheets.

## 2021-09-19 NOTE — PROGRESS NOTES
Lung Transplant Consult Follow Up Note   September 19, 2021            Assessment and Plan:   Edson Thornton is a 56-year-old male with NSIP/ILD-RA with hypoxic respiratory failure previously treated with leflunomide and Rituxan, more recently with high-dose steroids and broad-spectrum antibiotics.  Continued gradual improvement in oxygen requirements in the past week but still requiring significant high flow oxygen for adequate oxygen saturation.  The patient has completed a lung transplantation evaluation and appears to be an appropriate candidate for lung transplantation once his prednisone is tapered to 20 mg daily.      Per ID consultants, no infectious contraindications for transplant but recommended the following:  -Single dose of ivermectin 200 mcg/kg x1 for possible strongyloides exposure (done)  -Urinary Coccidioides Ag (Pending) then monthly for 12 months after the transplant.  -When transplanted please send, in addition to the lungs, a mediastinal LN for pathology, fungal cx and AFB smear and cx.   -The patient will likely need to follow up with ID as an outpatient within 2 months of lung transplant to follow up on the urinary Cocci Ag and the LN and lungs biopsies.     -Patient requires first left mandibular molar extraction for asymptomatic dental abscess noted on CT prior to transplant (when dental surgery can schedule).    -Check peripheral smear to evaluate progressive thrombocytopenia    -TPMT noted to be low on transplant evaluation.  We will try to avoid azathioprine but if required will initiate at low dose.    Will defer to general pulmonary consult service and primary hospitalist service regarding duration of antibiotics.  Agree with current CellCept and steroid taper.          Abiodun Woods MD            Interval History:   Breathing is comfortable at rest with supplemental oxygen.  Gradual decrease in oxygen requirement.  Dyspnea with mild exertion, not significantly changed.   Occasional cough.  No sputum.  No chest pain.  No hemoptysis.  Denies dental pain.           Review of Systems:   C: NEGATIVE for fever, chills  INTEGUMENTARY/SKIN: no rash or obvious new lesions  ENT/MOUTH: no sore throat, new sinus pain or nasal drainage  RESP: see interval history  CV: NEGATIVE for chest pain, palpitations or peripheral edema  GI: no nausea, vomiting, change in stools  : no dysuria  MUSCULOSKELETAL: no myalgias, arthralgias  PSYCHIATRIC: mood stable          Medications:       amLODIPine  5 mg Oral Daily     azithromycin  250 mg Oral Daily     cholestyramine  2 packet Oral TID     escitalopram  5 mg Oral QPM     fluticasone  1 puff Inhalation Daily     heparin ANTICOAGULANT  5,000 Units Subcutaneous Q12H     insulin aspart   Subcutaneous TID AC     insulin aspart  1-7 Units Subcutaneous TID AC     insulin aspart  1-5 Units Subcutaneous At Bedtime     insulin glargine  8 Units Subcutaneous QAM AC     levothyroxine  25 mcg Oral QAM AC     melatonin  5 mg Oral At Bedtime     mycophenolate  1,000 mg Oral BID IS     pantoprazole  40 mg Oral BID AC     potassium chloride  40 mEq Oral Once     predniSONE  50 mg Oral Daily    Followed by     [START ON 9/21/2021] predniSONE  40 mg Oral Daily    Followed by     [START ON 9/24/2021] predniSONE  30 mg Oral Daily    Followed by     [START ON 9/27/2021] predniSONE  20 mg Oral Daily     sodium chloride (PF)  3 mL Intracatheter Q8H     sodium chloride (PF)  3 mL Intracatheter Q8H     sulfamethoxazole-trimethoprim  1 tablet Oral Once per day on Mon Wed Fri     vitamin D2  50,000 Units Oral Q7 Days     acetaminophen, benzocaine 20%, artificial tears ophthalmic solution, glucose **OR** dextrose **OR** glucagon, ipratropium - albuterol 0.5 mg/2.5 mg/3 mL **OR** ipratropium - albuterol 0.5 mg/2.5 mg/3 mL, lidocaine 4%, lidocaine 4%, lidocaine (buffered or not buffered), lidocaine (buffered or not buffered), - MEDICATION INSTRUCTIONS -, ondansetron **OR**  ondansetron, oxyCODONE **OR** oxyCODONE, - MEDICATION INSTRUCTIONS -, polyethylene glycol, sodium chloride (PF), sodium chloride (PF)         Physical Exam:   Temp:  [97.5  F (36.4  C)-98.4  F (36.9  C)] 97.5  F (36.4  C)  Pulse:  [] 94  Resp:  [17-20] 20  BP: (130-145)/(73-83) 135/73  FiO2 (%):  [40 %-50 %] 40 %  SpO2:  [94 %-100 %] 97 %    Intake/Output Summary (Last 24 hours) at 9/19/2021 0736  Last data filed at 9/19/2021 0600  Gross per 24 hour   Intake 640 ml   Output 1700 ml   Net -1060 ml     Constitutional:   Awake, alert and in no apparent distress     Eyes:   nonicteric     ENT:    oral mucosa moist without lesions     Lungs:   Diminished air flow.  Scattered basilar squeak/wheeze.  No crackles. No rhonchi.       Cardiovascular:   Normal S1 and S2.  RRR.  No murmur. No gallop. No rub.     Abdomen:   NABS, soft, nondistended, nontender.  No HSM.     Musculoskeletal:   No edema     Neurologic:   Alert and conversant.     Skin:   Warm, dry.  No rash on limited exam.             Data:   All laboratory and imaging data reviewed.    Results for orders placed or performed during the hospital encounter of 09/05/21 (from the past 24 hour(s))   Potassium   Result Value Ref Range    Potassium 3.7 3.4 - 5.3 mmol/L   Lactic acid whole blood   Result Value Ref Range    Lactic Acid 4.9 (HH) 0.7 - 2.0 mmol/L   Albumin level   Result Value Ref Range    Albumin 2.3 (L) 3.4 - 5.0 g/dL   Glucose by meter   Result Value Ref Range    GLUCOSE BY METER POCT 141 (H) 70 - 99 mg/dL   Lactic acid whole blood   Result Value Ref Range    Lactic Acid 4.4 (HH) 0.7 - 2.0 mmol/L   Glucose by meter   Result Value Ref Range    GLUCOSE BY METER POCT 167 (H) 70 - 99 mg/dL   Glucose by meter   Result Value Ref Range    GLUCOSE BY METER POCT 119 (H) 70 - 99 mg/dL   CBC with Platelets & Differential    Narrative    The following orders were created for panel order CBC with Platelets & Differential.  Procedure                                Abnormality         Status                     ---------                               -----------         ------                     CBC with platelets and d...[[id number]]  Abnormal            Final result               Manual Differential[[id number]]          Abnormal            Final result                 Please view results for these tests on the individual orders.   Basic metabolic panel   Result Value Ref Range    Sodium 140 133 - 144 mmol/L    Potassium 3.4 3.4 - 5.3 mmol/L    Chloride 109 94 - 109 mmol/L    Carbon Dioxide (CO2) 26 20 - 32 mmol/L    Anion Gap 5 3 - 14 mmol/L    Urea Nitrogen 12 7 - 30 mg/dL    Creatinine 0.52 (L) 0.66 - 1.25 mg/dL    Calcium 8.6 8.5 - 10.1 mg/dL    Glucose 127 (H) 70 - 99 mg/dL    GFR Estimate >90 >60 mL/min/1.73m2   CRP inflammation   Result Value Ref Range    CRP Inflammation 8.2 (H) 0.0 - 8.0 mg/L   CBC with platelets and differential   Result Value Ref Range    WBC Count 20.2 (H) 4.0 - 11.0 10e3/uL    RBC Count 3.37 (L) 4.40 - 5.90 10e6/uL    Hemoglobin 9.8 (L) 13.3 - 17.7 g/dL    Hematocrit 30.3 (L) 40.0 - 53.0 %    MCV 90 78 - 100 fL    MCH 29.1 26.5 - 33.0 pg    MCHC 32.3 31.5 - 36.5 g/dL    RDW 18.4 (H) 10.0 - 15.0 %    Platelet Count 95 (L) 150 - 450 10e3/uL   Manual Differential   Result Value Ref Range    % Neutrophils 80 %    % Lymphocytes 13 %    % Monocytes 3 %    % Eosinophils 2 %    % Basophils 0 %    % Myelocytes 2 %    Absolute Neutrophils 16.2 (H) 1.6 - 8.3 10e3/uL    Absolute Lymphocytes 2.6 0.8 - 5.3 10e3/uL    Absolute Monocytes 0.6 0.0 - 1.3 10e3/uL    Absolute Eosinophils 0.4 0.0 - 0.7 10e3/uL    Absolute Basophils 0.0 0.0 - 0.2 10e3/uL    Absolute Myelocytes 0.4 (H) <=0.0 10e3/uL    RBC Morphology Confirmed RBC Indices     Platelet Assessment  Automated Count Confirmed. Platelet morphology is normal.     Automated Count Confirmed. Platelet morphology is normal.    Teardrop Cells Slight (A) None Seen

## 2021-09-19 NOTE — PROGRESS NOTES
[facility]    Progress Note    Date of Service (when I saw the patient): 09/19/2021    Assessment & Plan   Edson Thornton is a 56 year old male with history of NSIP associated with rheumatoid arthritis and traction bronchiectasis who was admitted on 9/5/2021 with acute on chronic hypoxic respiratory failure likely from ILD. Weaning steroids now in preparation for transplant.     1. Acute hypoxic respiratory failure  secondary to progressive ILD, all are POA  Although the patient had tachypnea and leukocytosis on 9/16/2021, sepsis has been effectively ruled out.  2 view chest x-ray showed improvement in the patient's known interstitial lung disease.  -Appreciate pulm consult  -Continue BiPAP at night, HFNC during day  -Continue azithromycin as an anti-inflammatory  -Continue prednisone taper based on recommendations of pulmonology service  -Continue MMF 1000 mg twice daily based on recommendations of pulmonology service    2.  Thrombocytopenia with elevated reticulocyte count, not present on admission  No active bleeding.  I am concerned for hemolysis, versus endorgan failure related DIC.  -INR  -LDH, bilirubin  , Haptoglobin- held heparin SQ    3.  Lung transplant candidacy  -Awaiting a spot in the schedule for OR for dental surgery for teeth extraction prior to transplant.    -Although the patient had indeterminate quanteferon gold, this was deemed to be low risk for active or latent tuberculosis based on evaluation of transplant infectious disease.  -The patient received ivermectin at 15 mg X1 dose for treatment of possible stronglydies based on recommendations of transplant ID    4.  Vitamin D deficiency, POA  -Continue vitamin D replacement    5. Rheumatoid arthritis, all are POA  Patient seen by rheumatology this admission. No acute flare of rheumatoid arthritis. SINCERE antibodies negative, flow cytometry negative for CD19 cells, CK normal, aldolase normal. Anti-Irene-1  negative. The patient was treated with leflunomide from 5/21 to 7/21. Rheumatology considers that leflunomide toxicity might contribute to the patient's ILD. -Continue cholestyramine TID to increase leflunomide clearance.      6. 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 chronic medical problems  Hypothyroidism   Continue 25 mg levothyroxin qday     SALTY  Continue BiPAP at night.      Hypertension  On 5mg amlodipine, BP well controlled today.     Anxiety  Depression  Continue 5mg escitalopram        DVT Prophylaxis: UFH 5000U, q12h  Code Status: Full Code     Disposition: Awaiting lung transplant  Jordan Sears    Interval History   The patient is continuing to deny any new change in his shortness of breath.  No reported new chest pain or cough.  Four-point review of systems is otherwise negative    -Data reviewed today: I reviewed all new labs and imaging results over the last 24 hours.    Physical Exam   Temp: 97.5  F (36.4  C) Temp src: Axillary BP: 135/73 Pulse: 94   Resp: 20 SpO2: 97 % O2 Device: BiPAP/CPAP Oxygen Delivery: 30 LPM  Vitals:    09/16/21 0439 09/17/21 0504 09/19/21 0000   Weight: 68.1 kg (150 lb 2.1 oz) 68.3 kg (150 lb 9.2 oz) 65.5 kg (144 lb 6.4 oz)     Vital Signs with Ranges  Temp:  [97.5  F (36.4  C)-98.4  F (36.9  C)] 97.5  F (36.4  C)  Pulse:  [] 94  Resp:  [17-20] 20  BP: (130-145)/(73-83) 135/73  FiO2 (%):  [40 %-45 %] 40 %  SpO2:  [94 %-100 %] 97 %  I/O last 3 completed shifts:  In: 640 [P.O.:640]  Out: 1700 [Urine:1700]    Constitutional:   Increased work of breathing, AAOx3, NAD  Respiratory: On high flow nc oxygen, coarse breathing sounds with diffuse crackles on both lung fields  Cardiovascular: RRR, no r/m/g, S1, S2  GI: nontender  Skin/Integumen: warm, no jaundice, mild edema at the level of the ankles.    Medications     - MEDICATION INSTRUCTIONS -       - MEDICATION  INSTRUCTIONS -         amLODIPine  5 mg Oral Daily     azithromycin  250 mg Oral Daily     cholestyramine  2 packet Oral TID     escitalopram  5 mg Oral QPM     fluticasone  1 puff Inhalation Daily     heparin ANTICOAGULANT  5,000 Units Subcutaneous Q12H     insulin aspart   Subcutaneous TID AC     insulin aspart  1-7 Units Subcutaneous TID AC     insulin aspart  1-5 Units Subcutaneous At Bedtime     insulin glargine  8 Units Subcutaneous QAM AC     levothyroxine  25 mcg Oral QAM AC     melatonin  5 mg Oral At Bedtime     mycophenolate  1,000 mg Oral BID IS     pantoprazole  40 mg Oral BID AC     predniSONE  50 mg Oral Daily    Followed by     [START ON 9/21/2021] predniSONE  40 mg Oral Daily    Followed by     [START ON 9/24/2021] predniSONE  30 mg Oral Daily    Followed by     [START ON 9/27/2021] predniSONE  20 mg Oral Daily     sodium chloride (PF)  3 mL Intracatheter Q8H     sodium chloride (PF)  3 mL Intracatheter Q8H     sulfamethoxazole-trimethoprim  1 tablet Oral Once per day on Mon Wed Fri     vitamin D2  50,000 Units Oral Q7 Days       Data   Recent Labs   Lab 09/19/21  0843 09/19/21  0550 09/18/21  2210 09/18/21  1344 09/18/21  1218 09/18/21  0526 09/17/21  0828 09/17/21  0441   WBC  --  20.2*  --   --   --  18.4*  --  20.2*   HGB  --  9.8*  --   --   --  9.7*  --  10.0*   MCV  --  90  --   --   --  90  --  89   PLT  --  95*  --   --   --  123*  --  95*   NA  --  140  --   --   --  141  --  138   POTASSIUM  --  3.4  --   --  3.7 3.3*   < > 3.3*   CHLORIDE  --  109  --   --   --  109  --  106   CO2  --  26  --   --   --  26  --  28   BUN  --  12  --   --   --  12  --  9   CR  --  0.52*  --   --   --  0.58*  --  0.59*   ANIONGAP  --  5  --   --   --  6  --  4   ERIK  --  8.6  --   --   --  8.4*  --  8.7   * 127* 119*   < >  --  88   < > 78   ALBUMIN  --   --   --   --  2.3*  --   --   --     < > = values in this interval not displayed.       No results found for this or any previous visit (from the  past 24 hour(s)).

## 2021-09-19 NOTE — PROGRESS NOTES
09/18/21 1600   Call Information   Date of Call 09/18/21   Time of Call 1611   Name of person requesting the team Rema   Title of person requesting team RN   RRT Arrival time 1615   Time RRT ended 1625   Reason for call   Type of RRT Adult   Primary reason for call Sepsis suspected   Sepsis Suspected Elevated Lactate level;WBC <4 or >12   Was patient transferred from the ED, ICU, or PACU within last 24 hours prior to RRT call? No   SBAR   Situation LA 4.4   Background  PMH ILD and rheumatoid lung disease, RA, SALTY, hypothyroid, HTN,anxiety and depression, HLD, duodenal anomaly admitted on 9/5/2021 in transfer for lung transplant workup and risk of need for ECMO.    Notable History/Conditions Hypertension;End-Stage disease   Assessment pt is alert and oriented. VSS   Interventions No interventions   Patient Outcome   Patient Outcome Stabilized on unit   RRT Team   Attending/Primary/Covering Physician Gold 10   Date Attending Physician notified 09/18/21   Time Attending Physician notified 1611   Physician(s) Lexii BOSS   Lead RN Kenna Collier    RT n/a   Post RRT Intervention Assessment   Post RRT Assessment Stable/Improved   Date Follow Up Done 09/18/21   Time Follow Up Done 1910

## 2021-09-20 ENCOUNTER — APPOINTMENT (OUTPATIENT)
Dept: PHYSICAL THERAPY | Facility: CLINIC | Age: 56
End: 2021-09-20
Attending: STUDENT IN AN ORGANIZED HEALTH CARE EDUCATION/TRAINING PROGRAM
Payer: COMMERCIAL

## 2021-09-20 LAB
ANION GAP SERPL CALCULATED.3IONS-SCNC: 4 MMOL/L (ref 3–14)
BASOPHILS # BLD MANUAL: 0 10E3/UL (ref 0–0.2)
BASOPHILS NFR BLD MANUAL: 0 %
BUN SERPL-MCNC: 10 MG/DL (ref 7–30)
CALCIUM SERPL-MCNC: 9 MG/DL (ref 8.5–10.1)
CHLORIDE BLD-SCNC: 104 MMOL/L (ref 94–109)
CO2 SERPL-SCNC: 29 MMOL/L (ref 20–32)
CREAT SERPL-MCNC: 0.62 MG/DL (ref 0.66–1.25)
EOSINOPHIL # BLD MANUAL: 0.2 10E3/UL (ref 0–0.7)
EOSINOPHIL NFR BLD MANUAL: 1 %
ERYTHROCYTE [DISTWIDTH] IN BLOOD BY AUTOMATED COUNT: 18.5 % (ref 10–15)
GFR SERPL CREATININE-BSD FRML MDRD: >90 ML/MIN/1.73M2
GLUCOSE BLD-MCNC: 81 MG/DL (ref 70–99)
GLUCOSE BLDC GLUCOMTR-MCNC: 104 MG/DL (ref 70–99)
GLUCOSE BLDC GLUCOMTR-MCNC: 131 MG/DL (ref 70–99)
GLUCOSE BLDC GLUCOMTR-MCNC: 166 MG/DL (ref 70–99)
GLUCOSE BLDC GLUCOMTR-MCNC: 224 MG/DL (ref 70–99)
GLUCOSE BLDC GLUCOMTR-MCNC: 86 MG/DL (ref 70–99)
HAPTOGLOB SERPL-MCNC: 276 MG/DL (ref 32–197)
HCT VFR BLD AUTO: 31 % (ref 40–53)
HGB BLD-MCNC: 10 G/DL (ref 13.3–17.7)
LACTATE SERPL-SCNC: 3.4 MMOL/L (ref 0.7–2)
LYMPHOCYTES # BLD MANUAL: 3.1 10E3/UL (ref 0.8–5.3)
LYMPHOCYTES NFR BLD MANUAL: 18 %
MCH RBC QN AUTO: 28.9 PG (ref 26.5–33)
MCHC RBC AUTO-ENTMCNC: 32.3 G/DL (ref 31.5–36.5)
MCV RBC AUTO: 90 FL (ref 78–100)
MONOCYTES # BLD MANUAL: 0.2 10E3/UL (ref 0–1.3)
MONOCYTES NFR BLD MANUAL: 1 %
NEUTROPHILS # BLD MANUAL: 13.6 10E3/UL (ref 1.6–8.3)
NEUTROPHILS NFR BLD MANUAL: 80 %
PLAT MORPH BLD: ABNORMAL
PLATELET # BLD AUTO: 100 10E3/UL (ref 150–450)
POTASSIUM BLD-SCNC: 3.8 MMOL/L (ref 3.4–5.3)
RBC # BLD AUTO: 3.46 10E6/UL (ref 4.4–5.9)
RBC MORPH BLD: ABNORMAL
SODIUM SERPL-SCNC: 137 MMOL/L (ref 133–144)
WBC # BLD AUTO: 17 10E3/UL (ref 4–11)

## 2021-09-20 PROCEDURE — 250N000012 HC RX MED GY IP 250 OP 636 PS 637: Performed by: INTERNAL MEDICINE

## 2021-09-20 PROCEDURE — 99231 SBSQ HOSP IP/OBS SF/LOW 25: CPT | Performed by: PHYSICIAN ASSISTANT

## 2021-09-20 PROCEDURE — 80048 BASIC METABOLIC PNL TOTAL CA: CPT | Performed by: INTERNAL MEDICINE

## 2021-09-20 PROCEDURE — 250N000011 HC RX IP 250 OP 636: Performed by: PHYSICIAN ASSISTANT

## 2021-09-20 PROCEDURE — 999N000215 HC STATISTIC HFNC ADULT NON-CPAP

## 2021-09-20 PROCEDURE — 97110 THERAPEUTIC EXERCISES: CPT | Mod: GP

## 2021-09-20 PROCEDURE — 250N000013 HC RX MED GY IP 250 OP 250 PS 637: Performed by: STUDENT IN AN ORGANIZED HEALTH CARE EDUCATION/TRAINING PROGRAM

## 2021-09-20 PROCEDURE — 250N000013 HC RX MED GY IP 250 OP 250 PS 637: Performed by: PHYSICIAN ASSISTANT

## 2021-09-20 PROCEDURE — 250N000013 HC RX MED GY IP 250 OP 250 PS 637: Performed by: INTERNAL MEDICINE

## 2021-09-20 PROCEDURE — 999N000157 HC STATISTIC RCP TIME EA 10 MIN

## 2021-09-20 PROCEDURE — 94660 CPAP INITIATION&MGMT: CPT

## 2021-09-20 PROCEDURE — 83605 ASSAY OF LACTIC ACID: CPT | Performed by: INTERNAL MEDICINE

## 2021-09-20 PROCEDURE — 85027 COMPLETE CBC AUTOMATED: CPT | Performed by: INTERNAL MEDICINE

## 2021-09-20 PROCEDURE — 999N000044 HC STATISTIC CVC DRESSING CHANGE

## 2021-09-20 PROCEDURE — 120N000003 HC R&B IMCU UMMC

## 2021-09-20 PROCEDURE — 99232 SBSQ HOSP IP/OBS MODERATE 35: CPT | Performed by: INTERNAL MEDICINE

## 2021-09-20 PROCEDURE — 99232 SBSQ HOSP IP/OBS MODERATE 35: CPT | Mod: GC | Performed by: STUDENT IN AN ORGANIZED HEALTH CARE EDUCATION/TRAINING PROGRAM

## 2021-09-20 PROCEDURE — 36592 COLLECT BLOOD FROM PICC: CPT | Performed by: INTERNAL MEDICINE

## 2021-09-20 RX ADMIN — AZITHROMYCIN MONOHYDRATE 250 MG: 250 TABLET ORAL at 08:20

## 2021-09-20 RX ADMIN — PREDNISONE 50 MG: 50 TABLET ORAL at 08:20

## 2021-09-20 RX ADMIN — MYCOPHENOLATE MOFETIL 1000 MG: 500 TABLET, FILM COATED ORAL at 08:20

## 2021-09-20 RX ADMIN — CHOLESTYRAMINE 8 G: 4 POWDER, FOR SUSPENSION ORAL at 10:23

## 2021-09-20 RX ADMIN — INSULIN ASPART 9 UNITS: 100 INJECTION, SOLUTION INTRAVENOUS; SUBCUTANEOUS at 17:28

## 2021-09-20 RX ADMIN — MYCOPHENOLATE MOFETIL 1000 MG: 500 TABLET, FILM COATED ORAL at 18:14

## 2021-09-20 RX ADMIN — SULFAMETHOXAZOLE AND TRIMETHOPRIM 1 TABLET: 800; 160 TABLET ORAL at 08:20

## 2021-09-20 RX ADMIN — PANTOPRAZOLE SODIUM 40 MG: 40 TABLET, DELAYED RELEASE ORAL at 15:38

## 2021-09-20 RX ADMIN — HEPARIN SODIUM 5000 UNITS: 5000 INJECTION, SOLUTION INTRAVENOUS; SUBCUTANEOUS at 19:36

## 2021-09-20 RX ADMIN — Medication 5 MG: at 19:36

## 2021-09-20 RX ADMIN — INSULIN ASPART 8 UNITS: 100 INJECTION, SOLUTION INTRAVENOUS; SUBCUTANEOUS at 12:48

## 2021-09-20 RX ADMIN — FLUTICASONE FUROATE 1 PUFF: 100 POWDER RESPIRATORY (INHALATION) at 11:17

## 2021-09-20 RX ADMIN — PANTOPRAZOLE SODIUM 40 MG: 40 TABLET, DELAYED RELEASE ORAL at 08:20

## 2021-09-20 RX ADMIN — AMLODIPINE BESYLATE 5 MG: 5 TABLET ORAL at 08:20

## 2021-09-20 RX ADMIN — ESCITALOPRAM OXALATE 5 MG: 5 TABLET, FILM COATED ORAL at 19:36

## 2021-09-20 RX ADMIN — INSULIN ASPART 5 UNITS: 100 INJECTION, SOLUTION INTRAVENOUS; SUBCUTANEOUS at 08:22

## 2021-09-20 RX ADMIN — LEVOTHYROXINE SODIUM 25 MCG: 0.03 TABLET ORAL at 08:20

## 2021-09-20 ASSESSMENT — ACTIVITIES OF DAILY LIVING (ADL)
ADLS_ACUITY_SCORE: 12
ADLS_ACUITY_SCORE: 11
ADLS_ACUITY_SCORE: 11
ADLS_ACUITY_SCORE: 12
ADLS_ACUITY_SCORE: 11
ADLS_ACUITY_SCORE: 12

## 2021-09-20 ASSESSMENT — MIFFLIN-ST. JEOR
SCORE: 1393
SCORE: 1410

## 2021-09-20 NOTE — PROGRESS NOTES
[facility]   Pulmonary   Progress Note  Edson Thornton MRN: [id number]  1965  Date of Admission:9/5/2021  Date of Service: 09/20/2021        Assessment & Plan    56-year-old male (BMI 27.6) with history of NSIP/ILD, RA (status post Rituxan infusion, leflunomide), transferred from [facility] for acute on chronic hypoxemic respiratory failure refractory to treatment. He is approved for listing by the transplant evaluation committee pending tapering of his steroids to 20 mg/day or less prior to becoming active on the transplant list.     1. Acute on chronic hypoxemic respiratory failure  2. NSIP-ILD  3. Rheumatoid arthritis (positive anti-CCP, RF)     Discussion:   Patient continues to have stable O2 requirements with slow tapering of his steroids after initiating mycophenolate treatment. Plan is to taper steroids by 10 mg every 3 days while his mycophenolate levels become therapeutic. Will continue to trend CRP to ensure that he is immunosuppressed while his steroid taper is decreasing over this week.      Recommendations    -Continue with prednisone taper 40 mg x 3 days, 30 mg x 3 days, 20 mg daily  -Trend CRP q48H     We will continue to follow.     Patient seen & discussed w/  Dr. Horton.     Faisal Caban MD   Pulmonary/Critical Care Fellow   Pager #[phone]     Interval History      RN notes reviewed, no acute events overnight. Patient reports feeling well overall, states he is working through the PT exercises. He notes that family will be visiting him tomorrow. No fever, chills CP, abd pain, n/v/d, hemoptysis or sore throat.        Five-point ROS is negative except for what is noted in the interval history.    Physical Exam Temp:  [97.8  F (36.6  C)-98.3  F (36.8  C)] 98.3  F (36.8  C)  Pulse:  [] 87  Resp:  [18-20] 20  BP: (127-141)/(78-85) 127/80  FiO2 (%):  [40 %-50 %] 50 %  SpO2:  [92 %-100 %] 100 %  I/O last 3 completed shifts:  In: 760 [P.O.:760]  Out: 2575 [Urine:2575]  Wt Readings  from Last 1 Encounters:   09/20/21 66.9 kg (147 lb 7.8 oz)    Body mass index is 25.32 kg/m . FiO2 (%): 50 %  Resp: 20      Exam:  General:  Sitting upright on HFNC, cooperative, NAD.  HEENT: Anicteric sclera. EOMI.  Lungs: Bibasilar crackles; speaking in full sentences on HFNC 50% O2 at 30 LPM.   Abd: Soft, NT, ND  Ext: WWP,  No LE edema  Skin: Plethoric appearing face, otherwise no cyanosis, or jaundice  Neuro: AAOx3, no focal deficits    Data   Labs: reviewed in EMR and notable labs listed below.  CBC RESULTS: Recent Labs   Lab Test 09/15/21  0454   WBC 23.3*   RBC 3.42*   HGB 9.8*   HCT 30.2*   MCV 88   MCH 28.7   MCHC 32.5   RDW 17.2*   *           Imaging: reviewed in EMR and notable imaging listed below.    Chest X-Ray 2 views 9/19/21  IMPRESSION: Decreased diffuse mixed interstitial and airspace  Opacities.    Chest CT high resolution 9/7/21:  IMPRESSION:   1. Extensive bilateral groundglass and reticular opacities are  slightly increased compared to chest CT from 8/30/2021. This likely  represents progression of known NSIP, however superimposed infection  or cannot be excluded.  2. Small bilateral pleural effusions.  3. Stable mediastinal lymphadenopathy, likely reactive.     Medications     amLODIPine  5 mg Oral Daily     azithromycin  250 mg Oral Daily     escitalopram  5 mg Oral QPM     fluticasone  1 puff Inhalation Daily     [Held by provider] heparin ANTICOAGULANT  5,000 Units Subcutaneous Q12H     insulin aspart   Subcutaneous TID AC     insulin aspart  1-7 Units Subcutaneous TID AC     insulin aspart  1-5 Units Subcutaneous At Bedtime     insulin glargine  8 Units Subcutaneous QAM AC     levothyroxine  25 mcg Oral QAM AC     melatonin  5 mg Oral At Bedtime     mycophenolate  1,000 mg Oral BID IS     pantoprazole  40 mg Oral BID AC     [START ON 9/21/2021] predniSONE  40 mg Oral Daily    Followed by     [START ON 9/24/2021] predniSONE  30 mg Oral Daily    Followed by     [START ON 9/27/2021]  predniSONE  20 mg Oral Daily     sodium chloride (PF)  3 mL Intracatheter Q8H     sodium chloride (PF)  3 mL Intracatheter Q8H     sulfamethoxazole-trimethoprim  1 tablet Oral Once per day on Mon Wed Fri     vitamin D2  50,000 Units Oral Q7 Days

## 2021-09-20 NOTE — PLAN OF CARE
Neuro: A&Ox4.   Cardiac: SR. VSS.   Respiratory: Sating > 90% on 50% FiO2 HFNC, 40% FiO2 on BiPAP while sleeping.  GI/: Adequate urine output. BM X1  Diet/appetite: Tolerating regular diet. Eating well. Sliding scale insulin and carb coverage orders in place.  Activity:  Assist of 1, up to chair.  Pain: At acceptable level on current regimen.   Skin: No new deficits noted.  LDA's: Right TL PICC.     Plan: Continue with POC. Notify primary team with changes.

## 2021-09-20 NOTE — PROGRESS NOTES
[facility]    Progress Note    Date of Service (when I saw the patient): 09/20/2021    Assessment & Plan   56 year old male with history of NSIP associated with rheumatoid arthritis and traction bronchiectasis who was admitted on 9/5/2021 with acute on chronic hypoxic respiratory failure likely from ILD. Weaning steroids now in preparation for transplant.     1. Acute hypoxic respiratory failure  secondary to progressive ILD, all are POA  Although the patient had tachypnea and leukocytosis on 9/16/2021, sepsis has been effectively ruled out.  2 view chest x-ray showed improvement in the patient's known interstitial lung disease.  -Appreciate pulm consult  -Continue BiPAP at night, HFNC during day  -Continue azithromycin as an anti-inflammatory  -Continue prednisone taper based on recommendations of pulmonology service  -Continue MMF 1000 mg twice daily based on recommendations of pulmonology service    2. Moderate (Non-severe) malnutrition in the context of chronic illness, all are POA  -We will follow recommendations of adult dietitian    3.  Thrombocytopenia with elevated reticulocyte count, not present on admission, improved  No active bleeding.  I am concerned for hemolysis, versus endorgan failure related DIC.  DIC has been effectively ruled out with normal bilirubin, elevated haptoglobin, and normal INR.  The patient thrombocytopenia has improved.    4.  Lung transplant candidacy  -Awaiting a spot in the schedule for OR for dental surgery for teeth extraction prior to transplant.    -Although the patient had indeterminate quanteferon gold, this was deemed to be low risk for active or latent tuberculosis based on evaluation of transplant infectious disease.  -The patient received ivermectin at 15 mg X1 dose for treatment of possible stronglydies based on recommendations of transplant ID  -Following pending urinary coccidoses antigen  -The patient will need monthly  coccidoses antigen for 12 months after transplant  - When transplanted please send, in addition to the lungs, a mediastinal LN for pathology, fungal cx and AFB smear and cx.   -The patient will likely need to follow up with ID as an outpatient within 2 months of lung transplant to follow up on the urinary Cocci Ag and the LN and lungs biopsies.   -Azathioprine to be avoided after transplant given low TPMT based on recommendations of transplant pulmonology, however if used, then azathioprine to be used in a low-dose    5.  Vitamin D deficiency, POA  -Continue vitamin D replacement    6. Rheumatoid arthritis, all are POA  Patient seen by rheumatology this admission. No acute flare of rheumatoid arthritis. SINCERE antibodies negative, flow cytometry negative for CD19 cells, CK normal, aldolase normal. Anti-Irene-1 negative. The patient was treated with leflunomide from 5/21 to 7/21. Rheumatology considers that leflunomide toxicity might contribute to the patient's ILD. -Continue cholestyramine TID to increase leflunomide clearance.      7. 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 chronic medical problems  Hypothyroidism   Continue 25 mg levothyroxin qday     SALTY  Continue BiPAP at night.      Hypertension  On 5mg amlodipine, BP well controlled today.     Anxiety  Depression  Continue 5mg escitalopram        DVT Prophylaxis: UFH 5000U, q12h  Code Status: Full Code     Disposition: Awaiting lung transplant  Jordan HILDAJaneth Sears    Interval History   The patient reported improvement in his shortness of breath.  No reported new chest pain or cough.  Four-point review of systems is otherwise negative    -Data reviewed today: I reviewed all new labs and imaging results over the last 24 hours.    Physical Exam   Temp: 97.8  F (36.6  C) Temp src: Oral BP: 129/83 Pulse: 83   Resp: 20 SpO2: 92 % O2 Device: High Flow Nasal Cannula (HFNC) Oxygen  Delivery: 30 LPM  Vitals:    09/17/21 0504 09/19/21 0000 09/20/21 0041   Weight: 68.3 kg (150 lb 9.2 oz) 65.5 kg (144 lb 6.4 oz) 65.2 kg (143 lb 11.8 oz)     Vital Signs with Ranges  Temp:  [97.8  F (36.6  C)-98.3  F (36.8  C)] 97.8  F (36.6  C)  Pulse:  [] 83  Resp:  [18-20] 20  BP: (129-141)/(76-85) 129/83  FiO2 (%):  [40 %-50 %] 50 %  SpO2:  [92 %-97 %] 92 %  I/O last 3 completed shifts:  In: 760 [P.O.:760]  Out: 2575 [Urine:2575]    Constitutional:   Increased work of breathing, AAOx3, NAD  Respiratory: On high flow nc oxygen, coarse breathing sounds with diffuse crackles on both lung fields  Cardiovascular: RRR, no r/m/g, S1, S2  GI: nontender  Skin/Integumen: warm, no jaundice, mild edema at the level of the ankles.    Medications     - MEDICATION INSTRUCTIONS -       - MEDICATION INSTRUCTIONS -         amLODIPine  5 mg Oral Daily     azithromycin  250 mg Oral Daily     cholestyramine  2 packet Oral TID     escitalopram  5 mg Oral QPM     fluticasone  1 puff Inhalation Daily     [Held by provider] heparin ANTICOAGULANT  5,000 Units Subcutaneous Q12H     insulin aspart   Subcutaneous TID AC     insulin aspart  1-7 Units Subcutaneous TID AC     insulin aspart  1-5 Units Subcutaneous At Bedtime     insulin glargine  8 Units Subcutaneous QAM AC     levothyroxine  25 mcg Oral QAM AC     melatonin  5 mg Oral At Bedtime     mycophenolate  1,000 mg Oral BID IS     pantoprazole  40 mg Oral BID AC     [START ON 9/21/2021] predniSONE  40 mg Oral Daily    Followed by     [START ON 9/24/2021] predniSONE  30 mg Oral Daily    Followed by     [START ON 9/27/2021] predniSONE  20 mg Oral Daily     sodium chloride (PF)  3 mL Intracatheter Q8H     sodium chloride (PF)  3 mL Intracatheter Q8H     sulfamethoxazole-trimethoprim  1 tablet Oral Once per day on Mon Wed Fri     vitamin D2  50,000 Units Oral Q7 Days       Data   Recent Labs   Lab 09/20/21  0529 09/19/21  2233 09/19/21  1650 09/19/21  1629 09/19/21  1304  09/19/21  0843 09/19/21  0550 09/18/21  1344 09/18/21  1218 09/18/21  0526 09/18/21  0526   WBC 17.0*  --   --   --  21.6*  --  20.2*  --   --    < > 18.4*   HGB 10.0*  --   --   --  10.2*  --  9.8*  --   --    < > 9.7*   MCV 90  --   --   --  90  --  90  --   --    < > 90   *  --   --   --  83*  --  95*  --   --    < > 123*   INR  --   --   --  0.95  --   --   --   --   --   --   --      --   --   --   --   --  140  --   --   --  141   POTASSIUM 3.8  --   --   --   --   --  3.4  --  3.7   < > 3.3*   CHLORIDE 104  --   --   --   --   --  109  --   --   --  109   CO2 29  --   --   --   --   --  26  --   --   --  26   BUN 10  --   --   --   --   --  12  --   --   --  12   CR 0.62*  --   --   --   --   --  0.52*  --   --   --  0.58*   ANIONGAP 4  --   --   --   --   --  5  --   --   --  6   ERIK 9.0  --   --   --   --   --  8.6  --   --   --  8.4*   GLC 81 84 153*  --   --    < > 127*   < >  --    < > 88   ALBUMIN  --   --   --   --   --   --  2.2*  --  2.3*  --   --    PROTTOTAL  --   --   --   --   --   --  5.2*  --   --   --   --    BILITOTAL  --   --   --   --   --   --  0.2  --   --   --   --    ALKPHOS  --   --   --   --   --   --  105  --   --   --   --    ALT  --   --   --   --   --   --  74*  --   --   --   --    AST  --   --   --   --   --   --  26  --   --   --   --     < > = values in this interval not displayed.       Recent Results (from the past 24 hour(s))   XR Chest 2 Views    Narrative    EXAM: XR CHEST 2 VW  9/19/2021 10:38 AM     HISTORY:  follow up for ILD       COMPARISON:  CT 9/7/2021, radiograph 9/7/2021    FINDINGS: Two views of the chest. Decreased diffuse mixed interstitial  and airspace opacities. Trace left pleural effusion. No pneumothorax.  Right upper extremity PICC tip at the right atrium.      Impression    IMPRESSION: Decreased diffuse mixed interstitial and airspace  opacities.    I have personally reviewed the examination and initial interpretation  and I agree with the  findings.    FREDDY LEAHY MD         SYSTEM ID:  [id number]

## 2021-09-20 NOTE — PLAN OF CARE
Major Shift Events: A&OX4 .clustered care done during the shift to promote rest and sleeping . Hemodynamically stable. Afebrile . On HFNC FIO2 45-50%  during the day and BIPAP 40% during the night . Triggered lactic acid and was 1.9 . Stable.   Plan: continue to monitor and report any concerns.   For vital signs and complete assessments, please see documentation flowsheets.

## 2021-09-20 NOTE — PROGRESS NOTES
CLINICAL NUTRITION SERVICES - REASSESSMENT NOTE     Nutrition Prescription    RECOMMENDATIONS FOR MDs/PROVIDERS TO ORDER:  None at this time     Malnutrition Status:    Moderate (Non-severe) malnutrition in the context of chronic illness    Recommendations already ordered by Registered Dietitian (RD):  Patient may order supplements PRN     Future/Additional Recommendations:  Will continue to revise estimated energy/protein needs pending weight trends   Continue to monitor appetite, oral intake, use of supplements   Need for additional transplant nutrition education     If enteral nutrition is needed:   Pre transplant: Osmolite 1.5 Conner @ goal of  50ml/hr (1200 ml/day) + 1 packet prosource  will provide: 1840 kcals (28 kcal/kg), 86g PRO (1.3) , 914 ml free H20, 244 g CHO, and 0 g fiber daily.     Post transplant: Osmolite 1.5 Conner @ goal of  60ml/hr (1440ml/day) + 1 packet prosurce  will provide: 2200 kcals (34 kca/kg), 101 g PRO (1.5 g/kg), 1097 ml free H20, 293 g CHO, and 0 g fiber daily.     EVALUATION OF THE PROGRESS TOWARD GOALS   Diet: Regular + snacks/supplements PRN   Intake: 100%   [employer] [facility] reviewed the past 3 days:   9/17: 2 meals ordered, 1679 kcal and 41 g protein   9/18 2 meals ordered,  1399 kcal and 40 g protein  9/19: 2 meals ordered, 1392 kcal and 40 g protein   Average intake 1490 kcal and 40 g protein meeting 93% and 52% of low end estimated energy/protein needs     NEW FINDINGS   Weight Trends:  09/20/21 0041 65.2 kg (143 lb 11.8 oz)   09/19/21 0000 65.5 kg (144 lb 6.4 oz)   09/15/21 0650 65 kg (143 lb 4.8 oz)   09/15/21 0639 67.5 kg (148 lb 13 oz)   09/13/21 2116 70.6 kg (155 lb 10.3 oz)   09/13/21 0020 64 kg (141 lb 1.5 oz)   09/10/21 0400 68.1 kg (150 lb 1.6 oz)   09/06/21 0340 72.9 kg (160 lb 11.5 oz)   09/05/21 1752 73.3 kg (161 lb 8 oz)   Will maintain dosing weight of 64 kg (lowest admission weight)     GI: No nausea noted. Last bowel movement,9/19, one stool documented.   Skin: No new  deficits   Labs: Creatinine 0.62 (L), Glucose 81 (WNL)   Medications:   Cholestyramine  Novolog/Latnus   Levothyroxine  Mycophenolate  Protonix  Prednisone    MALNUTRITION  % Intake: Decreased intake does not meet criteria  % Weight Loss: > 5% in 1 month (severe)  Subcutaneous Fat Loss: None observed  Muscle Loss: Temporal:  mild, Upper arm (bicep, tricep):  mild and Dorsal hand:  moderate  Fluid Accumulation/Edema: No noted   Malnutrition Diagnosis: Moderate (Non-severe) malnutrition in the context of chronic illness    Previous Goals   Patient to consume % of nutritionally adequate meal trays TID, or the equivalent with supplements/snacks.   Evaluation: Not met for protein, met for energy     Previous Nutrition Diagnosis  Inadequate protein intake related to food choices as evidenced by patient consuming 45-84 g protein/day with average of 62 g/day (80%)   Evaluation: Declining    CURRENT NUTRITION DIAGNOSIS  Inadequate protein intake related to food choices as evidenced by patient meeting 93% of low end energy needs but 52% of low end protein needs    INTERVENTIONS  Implementation  Collaboration with other providers  Medical food supplement therapy  Nutrition education for recommended modifications- discusses adequate calorie intake but low protein intake, reviewed sources of protein and encouraged supplements     Goals  Patient to consume % of nutritionally adequate meal trays TID, or the equivalent with supplements/snacks.    Monitoring/Evaluation  Progress toward goals will be monitored and evaluated per protocol.    Janny Khan RD, LD  6B pager: [phone]

## 2021-09-20 NOTE — PROGRESS NOTES
"Weekly status update      Transplant Phase: Transplant Evaluation complete. Current barriers to listing: insurance approval and still requiring high dose steroids (>20mg Prednisone/day) with taper plan in place.        Brief Assessment/Chart review:  Neuro: Alert/oriented x4, pleasant.   Respiratory: HFNC: FiO2 40-50% at rest. BIPAP: FiO2 40% while sleeping.  Activity: SBA - mobility limited by current O2 requirements.   PT/OT consult? Yes, following. Pt reports exercising with red and yellow bands at bedside throughout the day as able.        Fluid status:  Kidney function: Cr 0.62 (9/20/21)  Weight admission: 73.3kg. Now 66.9kg (standing scale)  BMI: 25.32 kg/m2 (Goal BMI of 18-30 per lung transplant listing guidelines)    CV:  RHC/Angio: completed 9/8/21  Read:    \"Right sided filling pressures are mildly elevated.    Moderately elevated pulmonary artery hypertension.    Left sided filling pressures are normal.    Normal cardiac output level.    Normal coronary arteries with mild tortuosity\"    ECHO with bubble completed 9/7/21  Read:   \"Interpretation Summary  Global and regional left ventricular function is normal with an EF of 60-65%.  Global right ventricular function is normal.  No significant valvular abnormalities present.  The patent foramen ovale was demonstrated by color Doppler .  Estimated pulmonary artery systolic pressure is 40 mmHg. Pulmonary  hypertension is present.  The inferior vena cava was normal in size with preserved respiratory  Variability. No pericardial effusion is present.\"      Anticoagulation:  - Heparin injections 5000 units q12h (DVT prophylaxis)    *Note: DVT prophylaxis for active candidates on lung transplant waitlist: Please order Heparin instead of Lovenox due to availability of reversal agent in the event the patient is called to the OR for transplant.       Blood transfusions:  Has not received blood products this admission.   Current Hgb: 10 (9/20/21)  Last PRA on 9/7/21 - " UNOS cPRA of 0 (standard lung mfi)  Recommend repeating PRA 3 months on: 12/7/21     *If considering blood transfusion, please contact transplant team/coordinator due to risk of sensitization.         Infection:  WBC trending down, currently 17 today.  Pt reports no new or increased respiratory symptoms, temps <99 per chart review. No increase in O2 requirements per documentation.     Antibiotics:   - Cefepime (9/5-9/15)  - Azithromycin (9/7-current)  - Fluconazole(9/5-9/14)  - Doxycycline (9/5-9/7)  - Bactrim (9/5-current)    ----------------------  - Solumedrol 125 mg through 9/14  9/15: Prednisone 60 mg x3 days  9/18: Prednisone 50mg x3 days  9/21: Prednisone 40mg x3 days  9/24: Prednisone 30mg x3 days  9/27: Prednisone 20mg daily    *Will need to be at Prednisone 20mg/day or lower in order to be actively listed for lung transplant.      Mood/Behavior/Coping:  Pleasant, denied questions related to transplant at this time.       Transplant education:  Completed 9/8-9/10/21. Hepatitis C paperwork completed 9/13/21, scanned copies placed in paper chart.      Mallory Beasley RN, BSN, PHN  Inpatient Lung Transplant Coordinator  Solid Organ Transplant  [facility]  Pager: [phone]

## 2021-09-21 ENCOUNTER — APPOINTMENT (OUTPATIENT)
Dept: PHYSICAL THERAPY | Facility: CLINIC | Age: 56
End: 2021-09-21
Attending: STUDENT IN AN ORGANIZED HEALTH CARE EDUCATION/TRAINING PROGRAM
Payer: COMMERCIAL

## 2021-09-21 ENCOUNTER — ANESTHESIA EVENT (OUTPATIENT)
Dept: SURGERY | Facility: CLINIC | Age: 56
End: 2021-09-21
Payer: COMMERCIAL

## 2021-09-21 LAB
ANION GAP SERPL CALCULATED.3IONS-SCNC: 8 MMOL/L (ref 3–14)
BACTERIA BLD CULT: NO GROWTH
BACTERIA BLD CULT: NO GROWTH
BASOPHILS # BLD AUTO: 0.1 10E3/UL (ref 0–0.2)
BASOPHILS NFR BLD AUTO: 0 %
BUN SERPL-MCNC: 12 MG/DL (ref 7–30)
CALCIUM SERPL-MCNC: 8.9 MG/DL (ref 8.5–10.1)
CHLORIDE BLD-SCNC: 102 MMOL/L (ref 94–109)
CO2 SERPL-SCNC: 29 MMOL/L (ref 20–32)
CREAT SERPL-MCNC: 0.59 MG/DL (ref 0.66–1.25)
CRP SERPL-MCNC: 14 MG/L (ref 0–8)
EOSINOPHIL # BLD AUTO: 0.2 10E3/UL (ref 0–0.7)
EOSINOPHIL NFR BLD AUTO: 1 %
ERYTHROCYTE [DISTWIDTH] IN BLOOD BY AUTOMATED COUNT: 18.6 % (ref 10–15)
GFR SERPL CREATININE-BSD FRML MDRD: >90 ML/MIN/1.73M2
GLUCOSE BLD-MCNC: 96 MG/DL (ref 70–99)
GLUCOSE BLDC GLUCOMTR-MCNC: 118 MG/DL (ref 70–99)
GLUCOSE BLDC GLUCOMTR-MCNC: 156 MG/DL (ref 70–99)
GLUCOSE BLDC GLUCOMTR-MCNC: 159 MG/DL (ref 70–99)
GLUCOSE BLDC GLUCOMTR-MCNC: 96 MG/DL (ref 70–99)
HCT VFR BLD AUTO: 31 % (ref 40–53)
HGB BLD-MCNC: 10.3 G/DL (ref 13.3–17.7)
IMM GRANULOCYTES # BLD: 0.8 10E3/UL
IMM GRANULOCYTES NFR BLD: 4 %
LACTATE SERPL-SCNC: 1.5 MMOL/L (ref 0.7–2)
LACTATE SERPL-SCNC: 1.8 MMOL/L (ref 0.7–2)
LYMPHOCYTES # BLD AUTO: 2.4 10E3/UL (ref 0.8–5.3)
LYMPHOCYTES NFR BLD AUTO: 13 %
MCH RBC QN AUTO: 29.4 PG (ref 26.5–33)
MCHC RBC AUTO-ENTMCNC: 33.2 G/DL (ref 31.5–36.5)
MCV RBC AUTO: 89 FL (ref 78–100)
MONOCYTES # BLD AUTO: 0.5 10E3/UL (ref 0–1.3)
MONOCYTES NFR BLD AUTO: 3 %
NEUTROPHILS # BLD AUTO: 14.7 10E3/UL (ref 1.6–8.3)
NEUTROPHILS NFR BLD AUTO: 79 %
NRBC # BLD AUTO: 0 10E3/UL
NRBC BLD AUTO-RTO: 0 /100
PATH REPORT.COMMENTS IMP SPEC: NORMAL
PATH REPORT.COMMENTS IMP SPEC: NORMAL
PATH REPORT.FINAL DX SPEC: NORMAL
PATH REPORT.MICROSCOPIC SPEC OTHER STN: NORMAL
PATH REPORT.MICROSCOPIC SPEC OTHER STN: NORMAL
PATH REPORT.RELEVANT HX SPEC: NORMAL
PLATELET # BLD AUTO: 104 10E3/UL (ref 150–450)
POTASSIUM BLD-SCNC: 3.5 MMOL/L (ref 3.4–5.3)
RBC # BLD AUTO: 3.5 10E6/UL (ref 4.4–5.9)
SCANNED LAB RESULT: NORMAL
SODIUM SERPL-SCNC: 139 MMOL/L (ref 133–144)
WBC # BLD AUTO: 18.7 10E3/UL (ref 4–11)

## 2021-09-21 PROCEDURE — 250N000013 HC RX MED GY IP 250 OP 250 PS 637: Performed by: PHYSICIAN ASSISTANT

## 2021-09-21 PROCEDURE — 250N000013 HC RX MED GY IP 250 OP 250 PS 637: Performed by: INTERNAL MEDICINE

## 2021-09-21 PROCEDURE — 85025 COMPLETE CBC W/AUTO DIFF WBC: CPT | Performed by: INTERNAL MEDICINE

## 2021-09-21 PROCEDURE — 99232 SBSQ HOSP IP/OBS MODERATE 35: CPT | Performed by: INTERNAL MEDICINE

## 2021-09-21 PROCEDURE — 83605 ASSAY OF LACTIC ACID: CPT | Performed by: INTERNAL MEDICINE

## 2021-09-21 PROCEDURE — 250N000013 HC RX MED GY IP 250 OP 250 PS 637: Performed by: STUDENT IN AN ORGANIZED HEALTH CARE EDUCATION/TRAINING PROGRAM

## 2021-09-21 PROCEDURE — 120N000003 HC R&B IMCU UMMC

## 2021-09-21 PROCEDURE — 97110 THERAPEUTIC EXERCISES: CPT | Mod: GP

## 2021-09-21 PROCEDURE — 250N000011 HC RX IP 250 OP 636: Performed by: PHYSICIAN ASSISTANT

## 2021-09-21 PROCEDURE — 86140 C-REACTIVE PROTEIN: CPT | Performed by: STUDENT IN AN ORGANIZED HEALTH CARE EDUCATION/TRAINING PROGRAM

## 2021-09-21 PROCEDURE — 250N000012 HC RX MED GY IP 250 OP 636 PS 637: Performed by: INTERNAL MEDICINE

## 2021-09-21 PROCEDURE — 94660 CPAP INITIATION&MGMT: CPT

## 2021-09-21 PROCEDURE — 83605 ASSAY OF LACTIC ACID: CPT | Performed by: PHYSICIAN ASSISTANT

## 2021-09-21 PROCEDURE — 80048 BASIC METABOLIC PNL TOTAL CA: CPT | Performed by: INTERNAL MEDICINE

## 2021-09-21 PROCEDURE — 999N000157 HC STATISTIC RCP TIME EA 10 MIN

## 2021-09-21 PROCEDURE — 36592 COLLECT BLOOD FROM PICC: CPT | Performed by: INTERNAL MEDICINE

## 2021-09-21 RX ORDER — HYDRALAZINE HYDROCHLORIDE 20 MG/ML
2.5-5 INJECTION INTRAMUSCULAR; INTRAVENOUS EVERY 10 MIN PRN
Status: CANCELLED | OUTPATIENT
Start: 2021-09-21

## 2021-09-21 RX ORDER — LABETALOL HYDROCHLORIDE 5 MG/ML
10 INJECTION, SOLUTION INTRAVENOUS
Status: CANCELLED | OUTPATIENT
Start: 2021-09-21

## 2021-09-21 RX ORDER — SODIUM CHLORIDE, SODIUM LACTATE, POTASSIUM CHLORIDE, CALCIUM CHLORIDE 600; 310; 30; 20 MG/100ML; MG/100ML; MG/100ML; MG/100ML
INJECTION, SOLUTION INTRAVENOUS CONTINUOUS
Status: CANCELLED | OUTPATIENT
Start: 2021-09-22

## 2021-09-21 RX ORDER — ONDANSETRON 4 MG/1
4 TABLET, ORALLY DISINTEGRATING ORAL EVERY 30 MIN PRN
Status: CANCELLED | OUTPATIENT
Start: 2021-09-21

## 2021-09-21 RX ORDER — ONDANSETRON 2 MG/ML
4 INJECTION INTRAMUSCULAR; INTRAVENOUS EVERY 30 MIN PRN
Status: CANCELLED | OUTPATIENT
Start: 2021-09-21

## 2021-09-21 RX ORDER — OXYCODONE HYDROCHLORIDE 5 MG/1
5 TABLET ORAL EVERY 4 HOURS PRN
Status: CANCELLED | OUTPATIENT
Start: 2021-09-21

## 2021-09-21 RX ORDER — DIMENHYDRINATE 50 MG/ML
25 INJECTION, SOLUTION INTRAMUSCULAR; INTRAVENOUS
Status: CANCELLED | OUTPATIENT
Start: 2021-09-21

## 2021-09-21 RX ORDER — MEPERIDINE HYDROCHLORIDE 25 MG/ML
12.5 INJECTION INTRAMUSCULAR; INTRAVENOUS; SUBCUTANEOUS EVERY 5 MIN PRN
Status: CANCELLED | OUTPATIENT
Start: 2021-09-21

## 2021-09-21 RX ADMIN — AZITHROMYCIN MONOHYDRATE 250 MG: 250 TABLET ORAL at 08:29

## 2021-09-21 RX ADMIN — PANTOPRAZOLE SODIUM 40 MG: 40 TABLET, DELAYED RELEASE ORAL at 17:58

## 2021-09-21 RX ADMIN — Medication 5 MG: at 19:53

## 2021-09-21 RX ADMIN — PANTOPRAZOLE SODIUM 40 MG: 40 TABLET, DELAYED RELEASE ORAL at 08:29

## 2021-09-21 RX ADMIN — LEVOTHYROXINE SODIUM 25 MCG: 0.03 TABLET ORAL at 08:29

## 2021-09-21 RX ADMIN — MYCOPHENOLATE MOFETIL 1000 MG: 500 TABLET, FILM COATED ORAL at 08:29

## 2021-09-21 RX ADMIN — INSULIN ASPART 5 UNITS: 100 INJECTION, SOLUTION INTRAVENOUS; SUBCUTANEOUS at 10:33

## 2021-09-21 RX ADMIN — ESCITALOPRAM OXALATE 5 MG: 5 TABLET, FILM COATED ORAL at 19:53

## 2021-09-21 RX ADMIN — INSULIN ASPART 7 UNITS: 100 INJECTION, SOLUTION INTRAVENOUS; SUBCUTANEOUS at 14:19

## 2021-09-21 RX ADMIN — FLUTICASONE FUROATE 1 PUFF: 100 POWDER RESPIRATORY (INHALATION) at 08:31

## 2021-09-21 RX ADMIN — AMLODIPINE BESYLATE 5 MG: 5 TABLET ORAL at 08:29

## 2021-09-21 RX ADMIN — HEPARIN SODIUM 5000 UNITS: 5000 INJECTION, SOLUTION INTRAVENOUS; SUBCUTANEOUS at 08:32

## 2021-09-21 RX ADMIN — MYCOPHENOLATE MOFETIL 1000 MG: 500 TABLET, FILM COATED ORAL at 17:58

## 2021-09-21 RX ADMIN — PREDNISONE 40 MG: 20 TABLET ORAL at 08:29

## 2021-09-21 RX ADMIN — INSULIN ASPART 7 UNITS: 100 INJECTION, SOLUTION INTRAVENOUS; SUBCUTANEOUS at 19:19

## 2021-09-21 ASSESSMENT — ACTIVITIES OF DAILY LIVING (ADL)
ADLS_ACUITY_SCORE: 11
ADLS_ACUITY_SCORE: 13
ADLS_ACUITY_SCORE: 12
ADLS_ACUITY_SCORE: 13
ADLS_ACUITY_SCORE: 11
ADLS_ACUITY_SCORE: 13

## 2021-09-21 ASSESSMENT — MIFFLIN-ST. JEOR: SCORE: 1411

## 2021-09-21 NOTE — PLAN OF CARE
"NEURO: A&O x4  VITALS: Blood pressure (!) 140/80, pulse 102, temperature 98.2  F (36.8  C), temperature source Oral, resp. rate 20, height 1.626 m (5' 4\"), weight 67 kg (147 lb 11.3 oz), SpO2 94 %.  PAIN: Denies  CARDIAC: ST  RESPIRATORY: HFNC when awake and bipap when asleep  GI: Bowel sounds active, no BM today, adequate PO intake  : Adequate UOP  LDA: PICC   IV: SL  ACTIVITY: Up with SBA  GOALS: Lung workup    "

## 2021-09-21 NOTE — PROVIDER NOTIFICATION
09/20/21 2200   Call Information   Date of Call 09/20/21   Time of Call 2129   Name of person requesting the team Caty MAIN   Title of person requesting team RN   RRT Arrival time 2135   Time RRT ended 2140   Reason for call   Type of RRT Adult   Primary reason for call Sepsis suspected   Sepsis Suspected Elevated Lactate level;Heart Rate > 100;WBC <4 or >12   Was patient transferred from the ED, ICU, or PACU within last 24 hours prior to RRT call? No   SBAR   Situation LA 3.4   Background  PMH ILD and rheumatoid lung disease, RA, SALTY, hypothyroid, HTN,anxiety and depression, HLD, duodenal anomaly admitted on 9/5/2021 in transfer for lung transplant workup and risk of need for ECMO.    Notable History/Conditions Hypertension;End-Stage disease   Assessment AAOx4, pleasant, cooperative and agreeable. Denied having any pain/discomfort. Breathing regular, unlabored and with equal chest expansion. On HFNC at 45% / 30 LPM. Vital signs stable.   Interventions No interventions   Patient Outcome   Patient Outcome Stabilized on unit   RRT Team   Attending/Primary/Covering Physician Gold 10   Date Attending Physician notified 09/20/21   Time Attending Physician notified 2129   Physician(s) Therese MAIN (PA)   Lead RN Alisha MAIN   RT n/a   Post RRT Intervention Assessment   Post RRT Assessment Stable/Improved   Date Follow Up Done 09/20/21   Time Follow Up Done 2340

## 2021-09-21 NOTE — PROGRESS NOTES
"SPIRITUAL HEALTH SERVICES Progress Note  [facility] ([place] 6B    Visited patient, \"Dennis\" per length of stay. Pt declined the visit.  Directed him to Spiritual Health Services for support per his request.    No follow up    Isac Andrew MDiv  Chaplain Resident  Pager [phone]    * [facility] remains available 24/7 for emergent requests/referrals, either by having the switchboard page the on-call  or by entering an ASAP/STAT consult in Epic (this will also page the on-call ). Routine Epic consults receive an initial response within 24 hours.  "

## 2021-09-21 NOTE — PLAN OF CARE
"Neuro: A&Ox4. Calls appropriately. Pleasant.   Cardiac: SR. VSS. Q8h vitals.   Respiratory: Sating >92 on 40% Bipap. Wears HFNC during the day at 60% and 30L. Dyspnea on exertion.   GI/: Adequate urine output. Uses the urinal at the bedside. Last BM 9/20.   Diet/appetite: Tolerating regular diet. Eating well.  Activity:  Stand by assist  Pain: At acceptable level on current regimen. Denies pain.  Skin: No new deficits noted.  LDA's: PICC saline locked.     Plan: Continue with POC. Notify primary team with changes.     /82 (BP Location: Left arm)   Pulse 88   Temp 98.1  F (36.7  C) (Axillary)   Resp 24   Ht 1.626 m (5' 4\")   Wt 67 kg (147 lb 11.3 oz)   SpO2 99%   BMI 25.35 kg/m      "

## 2021-09-21 NOTE — CODE/RAPID RESPONSE
Rapid Response Team Note    Assessment   In assessment a rapid response was called on Edson Thornton due to SIRS/Sepsis trigger. This presentation is likely due to ILD exacerbation, steroids, GAIL/LABAs and worsened by Hypertension  Chronic immunosuppression  RA.     Plan   -  Clinically looks good and feeling OK. Lactic acid levels have been very labile without clear cause. WBC is declining. No indication to escalate cares at this time. Recheck lactate in AM, otherwise monitor for changes overnight.  -  The Internal Medicine primary team was able to be reached and they are in agreement with the above plan.  -  Disposition: The patient will remain on the current unit. We will continue to monitor this patient closely.  -  Reassessment and plan follow-up will be performed by the primary team      VU BARTHOLOMEW PA  [facility] RRT AMCOM Job Code Contact #[phone]    Hospital Course   Brief Summary of events leading to rapid response:   RRT called for lactic acid 3.4, triggered by elevated WBC (17.0, improved from 21.6) and stable tachycardia (109). Patient has had variable lactic acid levels from 1-4.4 without clear cause. He reports feeling good at present, denies dyspnea. He has been afebrile. VS otherwise stable.    Admission Diagnosis:   ILD (interstitial lung disease) (H) [J84.9]     Physical Exam   Temp: 98.2  F (36.8  C) Temp  Min: 97.8  F (36.6  C)  Max: 98.6  F (37  C)  Resp: 20 Resp  Min: 18  Max: 24  SpO2: 96 % SpO2  Min: 92 %  Max: 100 %  Pulse: 109 Pulse  Min: 65  Max: 112    No data recorded  BP: 134/83 Systolic (24hrs), Av , Min:127 , Max:141   Diastolic (24hrs), Av, Min:80, Max:88     I/Os: I/O last 3 completed shifts:  In: 820 [P.O.:820]  Out: 2150 [Urine:2150]     Exam:   General: in no acute distress  Mental Status: AAOx4.  CV: RRR  Resp: diminished bases, nonlabored on HFNC    Significant Results and Procedures   Lactic Acid:   Recent Labs   Lab Test 21  09/18/21  1557   LACT 3.4* 1.9 4.4*     CBC:   Recent Labs   Lab Test 09/20/21  0529 09/19/21  1304 09/19/21  0550   WBC 17.0* 21.6* 20.2*   HGB 10.0* 10.2* 9.8*   HCT 31.0* 30.7* 30.3*   * 83* 95*        Sepsis Evaluation   The patient is not known to have an infection.  NO EVIDENCE OF SEPSIS at this time.  Vital sign, physical exam, and lab findings are due to ILD exacerbation and medications.

## 2021-09-21 NOTE — PROVIDER NOTIFICATION
-------------------CRITICAL LAB VALUE-------------------    Lab Value: lactic acid 3.4  Time of notification: 9:31 PM  MD notified: Goyo Griffin & code sepsis called  Patient status: VSS no acute changes  Temp:  [97.8  F (36.6  C)-98.6  F (37  C)] 98.2  F (36.8  C)  Pulse:  [] 109  Resp:  [18-24] 20  BP: (127-141)/(80-88) 134/83  FiO2 (%):  [40 %-50 %] 45 %  SpO2:  [92 %-100 %] 96 %  Orders received: Recheck lactic acid tomorrow morning

## 2021-09-22 ENCOUNTER — ANESTHESIA (OUTPATIENT)
Dept: SURGERY | Facility: CLINIC | Age: 56
End: 2021-09-22
Payer: COMMERCIAL

## 2021-09-22 LAB
ABO/RH(D): NORMAL
ANION GAP SERPL CALCULATED.3IONS-SCNC: 4 MMOL/L (ref 3–14)
ANTIBODY SCREEN: NEGATIVE
BASOPHILS # BLD AUTO: 0.1 10E3/UL (ref 0–0.2)
BASOPHILS NFR BLD AUTO: 1 %
BUN SERPL-MCNC: 15 MG/DL (ref 7–30)
CALCIUM SERPL-MCNC: 8.9 MG/DL (ref 8.5–10.1)
CHLORIDE BLD-SCNC: 104 MMOL/L (ref 94–109)
CO2 SERPL-SCNC: 31 MMOL/L (ref 20–32)
CREAT SERPL-MCNC: 0.69 MG/DL (ref 0.66–1.25)
CRP SERPL-MCNC: 7.4 MG/L (ref 0–8)
EOSINOPHIL # BLD AUTO: 0.3 10E3/UL (ref 0–0.7)
EOSINOPHIL NFR BLD AUTO: 2 %
ERYTHROCYTE [DISTWIDTH] IN BLOOD BY AUTOMATED COUNT: 18.7 % (ref 10–15)
GFR SERPL CREATININE-BSD FRML MDRD: >90 ML/MIN/1.73M2
GLUCOSE BLD-MCNC: 86 MG/DL (ref 70–99)
GLUCOSE BLDC GLUCOMTR-MCNC: 108 MG/DL (ref 70–99)
GLUCOSE BLDC GLUCOMTR-MCNC: 119 MG/DL (ref 70–99)
GLUCOSE BLDC GLUCOMTR-MCNC: 134 MG/DL (ref 70–99)
GLUCOSE BLDC GLUCOMTR-MCNC: 201 MG/DL (ref 70–99)
GLUCOSE BLDC GLUCOMTR-MCNC: 94 MG/DL (ref 70–99)
GLUCOSE BLDC GLUCOMTR-MCNC: 95 MG/DL (ref 70–99)
HCT VFR BLD AUTO: 32.5 % (ref 40–53)
HGB BLD-MCNC: 10.5 G/DL (ref 13.3–17.7)
IMM GRANULOCYTES # BLD: 0.7 10E3/UL
IMM GRANULOCYTES NFR BLD: 4 %
LYMPHOCYTES # BLD AUTO: 2.4 10E3/UL (ref 0.8–5.3)
LYMPHOCYTES NFR BLD AUTO: 15 %
MCH RBC QN AUTO: 29 PG (ref 26.5–33)
MCHC RBC AUTO-ENTMCNC: 32.3 G/DL (ref 31.5–36.5)
MCV RBC AUTO: 90 FL (ref 78–100)
MONOCYTES # BLD AUTO: 0.5 10E3/UL (ref 0–1.3)
MONOCYTES NFR BLD AUTO: 3 %
NEUTROPHILS # BLD AUTO: 12.1 10E3/UL (ref 1.6–8.3)
NEUTROPHILS NFR BLD AUTO: 75 %
NRBC # BLD AUTO: 0 10E3/UL
NRBC BLD AUTO-RTO: 0 /100
PLATELET # BLD AUTO: 103 10E3/UL (ref 150–450)
POTASSIUM BLD-SCNC: 3.6 MMOL/L (ref 3.4–5.3)
RBC # BLD AUTO: 3.62 10E6/UL (ref 4.4–5.9)
SODIUM SERPL-SCNC: 139 MMOL/L (ref 133–144)
SPECIMEN EXPIRATION DATE: NORMAL
WBC # BLD AUTO: 16.1 10E3/UL (ref 4–11)

## 2021-09-22 PROCEDURE — 999N000157 HC STATISTIC RCP TIME EA 10 MIN

## 2021-09-22 PROCEDURE — 86900 BLOOD TYPING SEROLOGIC ABO: CPT | Performed by: STUDENT IN AN ORGANIZED HEALTH CARE EDUCATION/TRAINING PROGRAM

## 2021-09-22 PROCEDURE — 250N000013 HC RX MED GY IP 250 OP 250 PS 637: Performed by: DENTIST

## 2021-09-22 PROCEDURE — 250N000009 HC RX 250: Performed by: DENTIST

## 2021-09-22 PROCEDURE — 0CDXXZ0 EXTRACTION OF LOWER TOOTH, SINGLE, EXTERNAL APPROACH: ICD-10-PCS | Performed by: DENTIST

## 2021-09-22 PROCEDURE — 360N000075 HC SURGERY LEVEL 2, PER MIN: Performed by: DENTIST

## 2021-09-22 PROCEDURE — 85025 COMPLETE CBC W/AUTO DIFF WBC: CPT | Performed by: INTERNAL MEDICINE

## 2021-09-22 PROCEDURE — 120N000003 HC R&B IMCU UMMC

## 2021-09-22 PROCEDURE — 94660 CPAP INITIATION&MGMT: CPT

## 2021-09-22 PROCEDURE — 250N000011 HC RX IP 250 OP 636: Performed by: NURSE ANESTHETIST, CERTIFIED REGISTERED

## 2021-09-22 PROCEDURE — 86140 C-REACTIVE PROTEIN: CPT | Performed by: INTERNAL MEDICINE

## 2021-09-22 PROCEDURE — 272N000001 HC OR GENERAL SUPPLY STERILE: Performed by: DENTIST

## 2021-09-22 PROCEDURE — 99232 SBSQ HOSP IP/OBS MODERATE 35: CPT | Mod: GC | Performed by: STUDENT IN AN ORGANIZED HEALTH CARE EDUCATION/TRAINING PROGRAM

## 2021-09-22 PROCEDURE — 250N000012 HC RX MED GY IP 250 OP 636 PS 637: Performed by: DENTIST

## 2021-09-22 PROCEDURE — 250N000011 HC RX IP 250 OP 636: Performed by: PHYSICIAN ASSISTANT

## 2021-09-22 PROCEDURE — 258N000003 HC RX IP 258 OP 636: Performed by: NURSE ANESTHETIST, CERTIFIED REGISTERED

## 2021-09-22 PROCEDURE — 370N000017 HC ANESTHESIA TECHNICAL FEE, PER MIN: Performed by: DENTIST

## 2021-09-22 PROCEDURE — 36592 COLLECT BLOOD FROM PICC: CPT | Performed by: INTERNAL MEDICINE

## 2021-09-22 PROCEDURE — 80048 BASIC METABOLIC PNL TOTAL CA: CPT | Performed by: INTERNAL MEDICINE

## 2021-09-22 PROCEDURE — 99233 SBSQ HOSP IP/OBS HIGH 50: CPT | Performed by: INTERNAL MEDICINE

## 2021-09-22 RX ORDER — CHLORHEXIDINE GLUCONATE ORAL RINSE 1.2 MG/ML
SOLUTION DENTAL PRN
Status: DISCONTINUED | OUTPATIENT
Start: 2021-09-22 | End: 2021-09-22

## 2021-09-22 RX ORDER — CEFAZOLIN SODIUM 2 G/100ML
INJECTION, SOLUTION INTRAVENOUS PRN
Status: DISCONTINUED | OUTPATIENT
Start: 2021-09-22 | End: 2021-09-22

## 2021-09-22 RX ORDER — CEFAZOLIN SODIUM 2 G/100ML
2 INJECTION, SOLUTION INTRAVENOUS
Status: DISCONTINUED | OUTPATIENT
Start: 2021-09-22 | End: 2021-09-22 | Stop reason: HOSPADM

## 2021-09-22 RX ORDER — FLUTICASONE PROPIONATE 50 MCG
1 SPRAY, SUSPENSION (ML) NASAL DAILY
Status: DISCONTINUED | OUTPATIENT
Start: 2021-09-23 | End: 2021-10-16

## 2021-09-22 RX ORDER — SODIUM CHLORIDE, SODIUM LACTATE, POTASSIUM CHLORIDE, CALCIUM CHLORIDE 600; 310; 30; 20 MG/100ML; MG/100ML; MG/100ML; MG/100ML
INJECTION, SOLUTION INTRAVENOUS CONTINUOUS PRN
Status: DISCONTINUED | OUTPATIENT
Start: 2021-09-22 | End: 2021-09-22

## 2021-09-22 RX ADMIN — FLUTICASONE FUROATE 1 PUFF: 100 POWDER RESPIRATORY (INHALATION) at 09:45

## 2021-09-22 RX ADMIN — INSULIN ASPART 7 UNITS: 100 INJECTION, SOLUTION INTRAVENOUS; SUBCUTANEOUS at 10:34

## 2021-09-22 RX ADMIN — PANTOPRAZOLE SODIUM 40 MG: 40 TABLET, DELAYED RELEASE ORAL at 16:54

## 2021-09-22 RX ADMIN — PREDNISONE 40 MG: 20 TABLET ORAL at 09:44

## 2021-09-22 RX ADMIN — ESCITALOPRAM OXALATE 5 MG: 5 TABLET, FILM COATED ORAL at 20:25

## 2021-09-22 RX ADMIN — ERGOCALCIFEROL 50000 UNITS: 1.25 CAPSULE, LIQUID FILLED ORAL at 16:54

## 2021-09-22 RX ADMIN — HEPARIN SODIUM 5000 UNITS: 5000 INJECTION, SOLUTION INTRAVENOUS; SUBCUTANEOUS at 20:27

## 2021-09-22 RX ADMIN — Medication 5 MG: at 20:25

## 2021-09-22 RX ADMIN — AMLODIPINE BESYLATE 5 MG: 5 TABLET ORAL at 09:45

## 2021-09-22 RX ADMIN — PANTOPRAZOLE SODIUM 40 MG: 40 TABLET, DELAYED RELEASE ORAL at 09:45

## 2021-09-22 RX ADMIN — MYCOPHENOLATE MOFETIL 1000 MG: 500 TABLET, FILM COATED ORAL at 18:39

## 2021-09-22 RX ADMIN — SULFAMETHOXAZOLE AND TRIMETHOPRIM 1 TABLET: 800; 160 TABLET ORAL at 09:44

## 2021-09-22 RX ADMIN — LEVOTHYROXINE SODIUM 25 MCG: 0.03 TABLET ORAL at 09:43

## 2021-09-22 RX ADMIN — Medication 2 G: at 07:51

## 2021-09-22 RX ADMIN — INSULIN ASPART 12 UNITS: 100 INJECTION, SOLUTION INTRAVENOUS; SUBCUTANEOUS at 18:41

## 2021-09-22 RX ADMIN — SODIUM CHLORIDE, POTASSIUM CHLORIDE, SODIUM LACTATE AND CALCIUM CHLORIDE: 600; 310; 30; 20 INJECTION, SOLUTION INTRAVENOUS at 07:36

## 2021-09-22 RX ADMIN — MYCOPHENOLATE MOFETIL 1000 MG: 500 TABLET, FILM COATED ORAL at 09:45

## 2021-09-22 RX ADMIN — AZITHROMYCIN MONOHYDRATE 250 MG: 250 TABLET ORAL at 09:44

## 2021-09-22 ASSESSMENT — ACTIVITIES OF DAILY LIVING (ADL)
ADLS_ACUITY_SCORE: 11
ADLS_ACUITY_SCORE: 10
ADLS_ACUITY_SCORE: 11
ADLS_ACUITY_SCORE: 11
ADLS_ACUITY_SCORE: 10

## 2021-09-22 ASSESSMENT — MIFFLIN-ST. JEOR: SCORE: 1425

## 2021-09-22 NOTE — PLAN OF CARE
"RN assumed care from 9/21 1900-9/22 1100  Neuro: A&Ox4. Calls appropriately. Pleasant. Has a L sided facial droop from post op local anesthetic with dental extraction.   Cardiac: SR. VSS. Q8h vitals.   Respiratory: Sating >92 on 40% Bipap. Wears HFNC during the day at 45-50%% and 30L. Dyspnea on exertion.   GI/: Adequate urine output. Uses the urinal at the bedside. Last BM 9/21.   Diet/appetite: Soft foods after dental extraction. Regular diet. Pt to take food as tolerated. Good appetite. Denies nausea.   Activity:  Stand by assist  Pain: At acceptable level on current regimen. Denies pain.  Skin: No new deficits noted. Blanchable redness on nose. Reddened scrotum and blanchable sacral redness.   LDA's: PICC saline locked.      Plan: Dental extraction occurred this AM. Possibly to be placed on txp list on Friday 9/24. Continue with POC. Notify primary team with changes.     Blood pressure 125/86, pulse 72, temperature 96.9  F (36.1  C), temperature source Axillary, resp. rate 25, height 1.626 m (5' 4\"), weight 68.4 kg (150 lb 12.7 oz), SpO2 98 %.      "

## 2021-09-22 NOTE — ANESTHESIA POSTPROCEDURE EVALUATION
Patient: Edson Thornton    Procedure(s):  EXTRACTION tooth #19    Diagnosis:Periodontal abscess [K05.219]  Diagnosis Additional Information: No value filed.    Anesthesia Type:  Other (see Comments), MAC    Note:  Disposition: Inpatient   Postop Pain Control: Uneventful            Sign Out: Well controlled pain   PONV: No   Neuro/Psych: Uneventful            Sign Out: Acceptable/Baseline neuro status   Airway/Respiratory: Uneventful            Sign Out: Acceptable/Baseline resp. status   CV/Hemodynamics: Uneventful            Sign Out: Acceptable CV status; No obvious hypovolemia; No obvious fluid overload   Other NRE: NONE   DID A NON-ROUTINE EVENT OCCUR? No           Last vitals:  Vitals Value Taken Time   BP     Temp     Pulse 75 09/22/21 1032   Resp     SpO2 96 % 09/22/21 1034   Vitals shown include unvalidated device data.    Electronically Signed By: Sugey Nogueira MD  September 22, 2021  10:36 AM

## 2021-09-22 NOTE — BRIEF OP NOTE
[facility]    Brief Operative Note    Pre-operative diagnosis: Periodontal abscess [K05.219]  Post-operative diagnosis same    Procedure: Procedure(s):  EXTRACTION tooth #19  Surgeon: Surgeon(s) and Role:     * Deepak Tobin DDS - Primary     * Thom Fry MD - Resident - Assisting      *Abigail Birch DMD - Resident - Assisting  Anesthesia: General   Estimated blood loss: 5ml  Drains: None  Specimens: None  Implants: none

## 2021-09-22 NOTE — PROGRESS NOTES
[facility]    Progress Note    Date of Service (when I saw the patient): 09/21/2021    Assessment & Plan   56 year old male with history of NSIP associated with rheumatoid arthritis and traction bronchiectasis who was admitted on 9/5/2021 with acute on chronic hypoxic respiratory failure likely from ILD. Weaning steroids now in preparation for transplant.     1. Acute hypoxic respiratory failure  secondary to progressive ILD, all are POA  Although the patient had tachypnea and leukocytosis on 9/16/2021, sepsis has been effectively ruled out.  2 view chest x-ray showed improvement in the patient's known interstitial lung disease.  -Appreciate pulm consult  -Continue BiPAP at night, HFNC during day  -Continue azithromycin as an anti-inflammatory  -Continue prednisone taper based on recommendations of pulmonology service  -Continue MMF 1000 mg twice daily based on recommendations of pulmonology service    2.  Lung transplant candidacy  -Plan for perimolar abscess excision on 9/22/2021 at 7:30 AM in the OR. Have a scheduled the procedure in the OR and this was accepted as an appointment by the dental service.  -Held heparin subcutaneous for the procedure.  -Although the patient had indeterminate quanteferon gold, this was deemed to be low risk for active or latent tuberculosis based on evaluation of transplant infectious disease.  -The patient received ivermectin at 15 mg X1 dose for treatment of possible stronglydies based on recommendations of transplant ID  -Following pending urinary coccidoses antigen  -The patient will need monthly coccidoses antigen for 12 months after transplant  - When transplanted please send, in addition to the lungs, a mediastinal LN for pathology, fungal cx and AFB smear and cx.   -The patient will likely need to follow up with ID as an outpatient within 2 months of lung transplant to follow up on the urinary Cocci Ag and the LN and lungs  biopsies.   -Azathioprine to be avoided after transplant given low TPMT based on recommendations of transplant pulmonology, however if used, then azathioprine to be used in a low-dose    3. Moderate (Non-severe) malnutrition in the context of chronic illness, all are POA  -We will follow recommendations of adult dietitian    4.  Thrombocytopenia with elevated reticulocyte count, not present on admission, improved  No active bleeding.  I am concerned for hemolysis, versus endorgan failure related DIC.  DIC has been effectively ruled out with normal bilirubin, elevated haptoglobin, and normal INR.  The patient thrombocytopenia has improved.    5.  Vitamin D deficiency, POA  -Continue vitamin D replacement    6. Rheumatoid arthritis, all are POA  Patient seen by rheumatology this admission. No acute flare of rheumatoid arthritis. SINCERE antibodies negative, flow cytometry negative for CD19 cells, CK normal, aldolase normal. Anti-Irene-1 negative. The patient was treated with leflunomide from 5/21 to 7/21. Rheumatology considers that leflunomide toxicity might contribute to the patient's ILD. -Continue cholestyramine TID to increase leflunomide clearance.      7. 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 chronic medical problems  Hypothyroidism   Continue 25 mg levothyroxin qday     SALTY  Continue BiPAP at night.      Hypertension  On 5mg amlodipine, BP well controlled today.     Anxiety  Depression  Continue 5mg escitalopram        DVT Prophylaxis: UFH 5000U, q12h  Code Status: Full Code     Disposition: Awaiting lung transplant  Jordan Sears    Interval History   The patient reported improvement in his shortness of breath.  No reported new chest pain or cough.  Four-point review of systems is otherwise negative    -Data reviewed today: I reviewed all new labs and imaging results over the last 24 hours.    Physical Exam    Temp: 97.4  F (36.3  C) Temp src: Axillary BP: 134/82 Pulse: 102   Resp: 22 SpO2: 94 % O2 Device: High Flow Nasal Cannula (HFNC) Oxygen Delivery: 30 LPM  Vitals:    09/20/21 0041 09/20/21 1000 09/21/21 0000   Weight: 65.2 kg (143 lb 11.8 oz) 66.9 kg (147 lb 7.8 oz) 67 kg (147 lb 11.3 oz)     Vital Signs with Ranges  Temp:  [97.4  F (36.3  C)-98.2  F (36.8  C)] 97.4  F (36.3  C)  Pulse:  [] 102  Resp:  [20-24] 22  BP: (115-140)/(76-82) 134/82  FiO2 (%):  [40 %-45 %] 45 %  SpO2:  [93 %-99 %] 94 %  I/O last 3 completed shifts:  In: 600 [P.O.:600]  Out: 1800 [Urine:1800]    Constitutional:   Increased work of breathing, AAOx3, NAD  Respiratory: On high flow nc oxygen, coarse breathing sounds with diffuse crackles on both lung fields  Cardiovascular: RRR, no r/m/g, S1, S2  GI: nontender  Skin/Integumen: warm, no jaundice, mild edema at the level of the ankles.    Medications     - MEDICATION INSTRUCTIONS -       - MEDICATION INSTRUCTIONS -         amLODIPine  5 mg Oral Daily     azithromycin  250 mg Oral Daily     escitalopram  5 mg Oral QPM     fluticasone  1 puff Inhalation Daily     [Held by provider] heparin ANTICOAGULANT  5,000 Units Subcutaneous Q12H     insulin aspart   Subcutaneous TID AC     insulin aspart  1-7 Units Subcutaneous TID AC     insulin aspart  1-5 Units Subcutaneous At Bedtime     insulin glargine  8 Units Subcutaneous QAM AC     levothyroxine  25 mcg Oral QAM AC     melatonin  5 mg Oral At Bedtime     mycophenolate  1,000 mg Oral BID IS     pantoprazole  40 mg Oral BID AC     predniSONE  40 mg Oral Daily    Followed by     [START ON 9/24/2021] predniSONE  30 mg Oral Daily    Followed by     [START ON 9/27/2021] predniSONE  20 mg Oral Daily     sodium chloride (PF)  3 mL Intracatheter Q8H     sodium chloride (PF)  3 mL Intracatheter Q8H     sulfamethoxazole-trimethoprim  1 tablet Oral Once per day on Mon Wed Fri     vitamin D2  50,000 Units Oral Q7 Days       Data   Recent Labs   Lab  09/21/21  1801 09/21/21  1227 09/21/21  0731 09/21/21  0440 09/20/21  2151 09/20/21  0531 09/20/21  0529 09/19/21  1650 09/19/21  1629 09/19/21  1304 09/19/21  0843 09/19/21  0550 09/18/21  1344 09/18/21  1218   0000   WBC  --   --   --  18.7*  --   --  17.0*  --   --  21.6*   < > 20.2*  --   --   --    HGB  --   --   --  10.3*  --   --  10.0*  --   --  10.2*   < > 9.8*  --   --   --    MCV  --   --   --  89  --   --  90  --   --  90   < > 90  --   --   --    PLT  --   --   --  104*  --   --  100*  --   --  83*   < > 95*  --   --   --    INR  --   --   --   --   --   --   --   --  0.95  --   --   --   --   --   --    NA  --   --   --  139  --   --  137  --   --   --   --  140  --   --   --    POTASSIUM  --   --   --  3.5  --   --  3.8  --   --   --   --  3.4   < > 3.7  --    CHLORIDE  --   --   --  102  --   --  104  --   --   --   --  109  --   --   --    CO2  --   --   --  29  --   --  29  --   --   --   --  26  --   --   --    BUN  --   --   --  12  --   --  10  --   --   --   --  12  --   --   --    CR  --   --   --  0.59*  --   --  0.62*  --   --   --   --  0.52*  --   --   --    ANIONGAP  --   --   --  8  --   --  4  --   --   --   --  5  --   --   --    ERIK  --   --   --  8.9  --   --  9.0  --   --   --   --  8.6  --   --   --    * 118* 96 96   < >   < > 81   < >  --   --    < > 127*   < >  --    < >   ALBUMIN  --   --   --   --   --   --   --   --   --   --   --  2.2*  --  2.3*  --    PROTTOTAL  --   --   --   --   --   --   --   --   --   --   --  5.2*  --   --   --    BILITOTAL  --   --   --   --   --   --   --   --   --   --   --  0.2  --   --   --    ALKPHOS  --   --   --   --   --   --   --   --   --   --   --  105  --   --   --    ALT  --   --   --   --   --   --   --   --   --   --   --  74*  --   --   --    AST  --   --   --   --   --   --   --   --   --   --   --  26  --   --   --     < > = values in this interval not displayed.       No results found for this or any previous visit (from the  past 24 hour(s)).

## 2021-09-22 NOTE — PROGRESS NOTES
[facility]   Pulmonary   Progress Note  Edson Thornton MRN: [id number]  1965  Date of Admission:9/5/2021  Date of Service: 09/22/2021        Assessment & Plan    56-year-old male (BMI 27.6) with history of NSIP/ILD, RA (status post Rituxan infusion, leflunomide), transferred from [facility] for acute on chronic hypoxemic respiratory failure refractory to treatment. He is approved for listing by the transplant evaluation committee pending tapering of his steroids to 20 mg/day or less prior to becoming active on the transplant list.     1. Acute on chronic hypoxemic respiratory failure  2. NSIP-ILD  3. Rheumatoid arthritis (positive anti-CCP, RF)     Discussion:   Patient continues to have stable O2 requirements with slow tapering of his steroids after initiating mycophenolate treatment. Plan is to complete taper by the end of the week with patient remaining on prednisone 20 mg daily.    Recommendations    -Continue with prednisone taper 30 mg x 3 days, 20 mg daily  -Trend CRP q48H     We will continue to follow.     Patient seen & discussed w/  Dr. Horton.     Faisal Caban MD   Pulmonary/Critical Care Fellow   Pager #[phone]     Interval History      RN notes reviewed, no acute events overnight.  Patient reports feeling the same compared to previous days.  Reports a good appetite, states he is moving to the chair, and is complying with as needed cares.  He continues to work with PT diligently.  No fever, chills, chest pain, hemoptysis, sore throat or night sweats.      Five-point ROS is negative except for what is noted in the interval history.    Physical Exam Temp:  [96.9  F (36.1  C)-98.2  F (36.8  C)] 98.2  F (36.8  C)  Pulse:  [72-82] 75  Resp:  [22-25] 22  BP: (125-144)/(74-88) 130/74  FiO2 (%):  [40 %-50 %] 50 %  SpO2:  [92 %-99 %] 99 %  I/O last 3 completed shifts:  In: 990 [P.O.:960; I.V.:30]  Out: 1775 [Urine:1775]  Wt Readings from Last 1 Encounters:   09/22/21 68.4 kg (150 lb 12.7 oz)     Body mass index is 25.88 kg/m . FiO2 (%): 50 %  Resp: 22      Exam:  General:  Sitting upright on HFNC, cooperative, NAD.  HEENT: Anicteric sclera. EOMI.  Lungs: Bibasilar crackles; speaking in full sentences on HFNC 45% O2 at 30 LPM.   Abd: Soft, NT, ND  Ext: WWP,  No LE edema  Skin: Plethoric appearing face, otherwise no cyanosis, or jaundice  Neuro: AAOx3, no focal deficits    Data   Labs: reviewed in EMR and notable labs listed below.  CBC RESULTS: Recent Labs   Lab Test 09/15/21  0454   WBC 23.3*   RBC 3.42*   HGB 9.8*   HCT 30.2*   MCV 88   MCH 28.7   MCHC 32.5   RDW 17.2*   *           Imaging: reviewed in EMR and notable imaging listed below.    Chest X-Ray 2 views 9/19/21  IMPRESSION: Decreased diffuse mixed interstitial and airspace  Opacities.    Chest CT high resolution 9/7/21:  IMPRESSION:   1. Extensive bilateral groundglass and reticular opacities are  slightly increased compared to chest CT from 8/30/2021. This likely  represents progression of known NSIP, however superimposed infection  or cannot be excluded.  2. Small bilateral pleural effusions.  3. Stable mediastinal lymphadenopathy, likely reactive.     Medications     amLODIPine  5 mg Oral Daily     azithromycin  250 mg Oral Daily     escitalopram  5 mg Oral QPM     fluticasone  1 puff Inhalation Daily     [START ON 9/23/2021] fluticasone  1 spray Both Nostrils Daily     [Held by provider] heparin ANTICOAGULANT  5,000 Units Subcutaneous Q12H     insulin aspart   Subcutaneous TID AC     insulin aspart  1-7 Units Subcutaneous TID AC     insulin aspart  1-5 Units Subcutaneous At Bedtime     insulin glargine  8 Units Subcutaneous QAM AC     levothyroxine  25 mcg Oral QAM AC     melatonin  5 mg Oral At Bedtime     mycophenolate  1,000 mg Oral BID IS     pantoprazole  40 mg Oral BID AC     predniSONE  40 mg Oral Daily    Followed by     [START ON 9/24/2021] predniSONE  30 mg Oral Daily    Followed by     [START ON 9/27/2021] predniSONE  20 mg  Oral Daily     sodium chloride (PF)  3 mL Intracatheter Q8H     sodium chloride (PF)  3 mL Intracatheter Q8H     sulfamethoxazole-trimethoprim  1 tablet Oral Once per day on Mon Wed Fri     vitamin D2  50,000 Units Oral Q7 Days

## 2021-09-22 NOTE — OP NOTE
Procedure Date: 09/22/2021    It was deemed necessary for this patient to be seen in the hospital operating room due to interstitial lung disease.    CONSENT:  Risks, complications including but not limited to postoperative pain, swelling, bleeding, infection, temporary/permanent paresthesia/anesthesia of cranial nerves V3, failure to resolve chief complaint.  Patient agreed to procedure as written and signed consent.    NAMES:  The attending physician was Miguel Tobin DDS.  The first assistant dental resident was Abigail Birch DMD.  The second assistant dental resident was Thom Fry DMD.  The anesthesiologist was Sugey Nogueira MD.  The scrub person was Jenny Rose.  The circulators were Abby Huerta RN, and Gavin Cee RN.  The CRNA was ILIR Perez CRNA.    SUMMARY:  Under local anesthesia, the following operations were performed in the mouth:    Surgical extraction of #19 which was the lower left first molar and alveoloplasty on the lower left quadrant near the extraction site.    PREOPERATIVE DIAGNOSIS:  Periodontal abscess.    POSTOPERATIVE DIAGNOSIS: Periodontal abscess.    GENERAL ANESTHESIA START:  This patient was not put under general anesthesia.  The patient was brought into the operating room and draped in the usual customary [facility] fashion.  Following the timeout procedures, the patient was given local anesthetic.    OBSERVATIONS:  Clinical examination and radiographic examination revealed periodontal abscess on #19, which is the lower left first molar, which had an existing porcelain crown on it and recurrent decay.  Lingual recession was evident on the lower left first molar as well.      Local anesthesia: 24.0 mL of mixture of 0.25% bupivacaine with 1:200,000 epinephrine and 1% lidocaine was given via an inferior alveolar nerve block and local buccal and lingual infiltration.     PROCEDURE:  The following  procedures were performed:    Surgical extraction of #19, the lower left first molar, was performed.  A sulcular incision was made with a 15 blade along the distal and buccal portions of #19 and the buccal portion of #20, which was the lower left second premolar.  An envelope flap was made to expose bone on the buccal portion of the lower left first molar, and bone was removed with surgical handpiece on buccal portion of lower left first molar and extracted with elevation and use of forceps.  Alveoloplasty performed on lower left quadrant at site of extraction with a bone file, and Surgicel was placed inside the socket.  One figure-of-eight 3-0 chromic gut suture was placed on the extraction site for #19, the lower left first molar.     DENTAL PROCEDURE FINISH:  After the dental procedure, the patient was transferred back to his hospital room.  The patient's nurse was informed of the postoperative instructions.    Deepak Tobin DDS    As Dictated by JACQUELYN ASKEW DMD        D: 2021   T: 2021   MT: LIA    Name:     KARSTEN GUERRIERLESLEE ELDRIDGE  MRN:      [id number]        Account:        [id number]   :      1965           Procedure Date: 2021     Document: [id number]

## 2021-09-22 NOTE — ANESTHESIA PREPROCEDURE EVALUATION
Anesthesia Pre-Procedure Evaluation    Patient: Edson Thornton   MRN: [id number] : 1965        Preoperative Diagnosis: Periodontal abscess [K05.219]   Procedure : Procedure(s):  EXTRACTION tooth #19     Past Medical History:   Diagnosis Date     Anxiety      Depression      HLD (hyperlipidemia)      HTN (hypertension)      Hypothyroidism      ILD (interstitial lung disease) (H)      SALTY on CPAP      Oxygen dependent     BL 4L since ~2021     Rheumatoid arthritis (H)     signs ~2020, dx 2021     Steroid-induced hyperglycemia      Traction bronchiectasis (H)       Past Surgical History:   Procedure Laterality Date     COLONOSCOPY W/ BIOPSIES AND POLYPECTOMY  2020     CV CORONARY ANGIOGRAM N/A 2021    Procedure: Coronary Angiogram with possible intervention;  Surgeon: Jovon Bullock MD;  [facility]     CV RIGHT HEART CATH MEASUREMENTS RECORDED N/A 2021    Procedure: Right Heart Cath;  Surgeon: Jovon Bullock MD;  [facility]: [facility]     HERNIA REPAIR       right acl       XR ACROMIOCLAVICULAR JOINT BILATERAL        No Known Allergies   Social History     Tobacco Use     Smoking status: Never Smoker     Smokeless tobacco: Never Used   Substance Use Topics     Alcohol use: Never      Wt Readings from Last 1 Encounters:   21 67 kg (147 lb 11.3 oz)        Anesthesia Evaluation   Pt has had prior anesthetic. Type: General.    No history of anesthetic complications       ROS/MED HX  ENT/Pulmonary: Comment:   -Lung Transplant candidate with known ILD, admitted with acute hypoxic respiratory failure. Currently managed by pulmonology with:  -O2 dependent since 2021, now on  BiPAP at night, HFNC during day  -Azithromycin as an anti-inflammatory  -Prednisone taper  -MMF 1000 mg BID      (+) sleep apnea, uses CPAP,     Neurologic:  - neg neurologic ROS     Cardiovascular:     (+) Dyslipidemia hypertension-range: On Amlodipine 5mg/  ----Previous cardiac testing   Echo: Date: 9/2021 Results:    Global and regional LV function is normal with an EF of 60-65%.Global RV function is normal. No significant valvular abnormalities present. The patent foramen ovale was demonstrated by color Doppler . Estimated pulmonary artery systolic pressure is 40 mmHg. Pulmonary hypertension is present. The inferior vena cava was normal in size with preserved respiratory  Variability. No pericardial effusion is present.  Stress Test: Date: Results:    ECG Reviewed: Date: Results:    Cath: Date: 9/2021 Results:   Right sided filling pressures are mildly elevated.   Moderately elevated pulmonary arteryhypertension.   Left sided filling pressures are normal.   Normal cardiac output level.   Normal coronary arteries with mild tortuosity      METS/Exercise Tolerance:     Hematologic:  - neg hematologic  ROS     Musculoskeletal: Comment:   RA  (+) arthritis,     GI/Hepatic:  - neg GI/hepatic ROS     Renal/Genitourinary:  - neg Renal ROS     Endo: Comment: Steroid induced DM, on insulin.     (+) thyroid problem, hypothyroidism,     Psychiatric/Substance Use:     (+) psychiatric history depression (ON ESCITALOPRAM)     Infectious Disease: Comment:   - Perimolar abscess awaiting excision  - indeterminate quanteferon gold, (deemed to be low risk for active or latent tuberculosis based on evaluation of transplant infectious disease).        Malignancy:  - neg malignancy ROS     Other:  - neg other ROS          Physical Exam    Airway  airway exam normal      Mallampati: II   TM distance: > 3 FB   Neck ROM: full   Mouth opening: > 3 cm    Respiratory Devices and Support     High Flow Nasal Canula      Dental  no notable dental history         Cardiovascular   cardiovascular exam normal          Pulmonary   pulmonary exam normal                OUTSIDE LABS:  CBC:   Lab Results   Component Value Date    WBC 18.7 (H) 09/21/2021    WBC 17.0 (H) 09/20/2021    HGB 10.3 (L) 09/21/2021     HGB 10.0 (L) 09/20/2021    HCT 31.0 (L) 09/21/2021    HCT 31.0 (L) 09/20/2021     (L) 09/21/2021     (L) 09/20/2021     BMP:   Lab Results   Component Value Date     09/21/2021     09/20/2021    POTASSIUM 3.5 09/21/2021    POTASSIUM 3.8 09/20/2021    CHLORIDE 102 09/21/2021    CHLORIDE 104 09/20/2021    CO2 29 09/21/2021    CO2 29 09/20/2021    BUN 12 09/21/2021    BUN 10 09/20/2021    CR 0.59 (L) 09/21/2021    CR 0.62 (L) 09/20/2021     (H) 09/21/2021     (H) 09/21/2021     COAGS:   Lab Results   Component Value Date    PTT 26 09/07/2021    INR 0.95 09/19/2021     POC: No results found for: BGM, HCG, HCGS  HEPATIC:   Lab Results   Component Value Date    ALBUMIN 2.2 (L) 09/19/2021    PROTTOTAL 5.2 (L) 09/19/2021    ALT 74 (H) 09/19/2021    AST 26 09/19/2021    ALKPHOS 105 09/19/2021    BILITOTAL 0.2 09/19/2021     OTHER:   Lab Results   Component Value Date    PH 7.48 (H) 09/05/2021    LACT 1.8 09/21/2021    A1C 6.6 (H) 09/07/2021    ERIK 8.9 09/21/2021    PHOS 3.2 09/07/2021    MAG 2.2 09/07/2021    AMYLASE 32 09/07/2021    TSH 0.11 (L) 09/07/2021    T4 0.68 (L) 09/07/2021    CRP 14.0 (H) 09/21/2021       Anesthesia Plan    ASA Status:  4   NPO Status:  NPO Appropriate    Anesthesia Type: General.     - Airway: ETT   Induction: Intravenous, Propofol.   Maintenance: TIVA.   Techniques and Equipment:     - Lines/Monitors: BIS     Consents    Anesthesia Plan(s) and associated risks, benefits, and realistic alternatives discussed. Questions answered and patient/representative(s) expressed understanding.     - Discussed with:  Patient      - Extended Intubation/Ventilatory Support Discussed: Yes.      - Patient is DNR/DNI Status: No    Use of blood products discussed: No .     Postoperative Care    Pain management: IV analgesics, Oral pain medications.   PONV prophylaxis: Ondansetron (or other 5HT-3)     Comments:    After conversation with Dr Tobin and surgical team, plan  will be to bring patient to  the OR, place him on standard monitoring, and maintain him spontaneously breathing on HFNC. Surgical team will attempt tooth extraction under local with no sedation. Will convert to general anesthesia with ETT if needed.   Patient understand potential risks and benefits of both plans. He agrees to above plan.     MD Sugey Gil MD

## 2021-09-22 NOTE — ANESTHESIA CARE TRANSFER NOTE
Patient: Edson Thornton    Procedure(s):  EXTRACTION tooth #19    Diagnosis: Periodontal abscess [K05.219]  Diagnosis Additional Information: No value filed.    Anesthesia Type:   Other (see Comments), MAC     Note:    Oropharynx: oropharynx clear of all foreign objects  Level of Consciousness: awake  Oxygen Supplementation: nasal cannula (HFNC)    Independent Airway: airway patency satisfactory and stable  Dentition: S/P dental procedure  Vital Signs Stable: post-procedure vital signs reviewed and stable  Report to RN Given: handoff report given  Patient transferred to: Phase II    Handoff Report: Identifed the Patient, Identified the Reponsible Provider, Reviewed the pertinent medical history, Discussed the surgical course, Reviewed Intra-OP anesthesia mangement and issues during anesthesia, Set expectations for post-procedure period and Allowed opportunity for questions and acknowledgement of understanding      Vitals:  Vitals Value Taken Time   BP     Temp     Pulse 76 09/22/21 1035   Resp     SpO2 96 % 09/22/21 1035   Vitals shown include unvalidated device data.    Electronically Signed By: Sugey Nogueira MD  September 22, 2021  10:36 AM

## 2021-09-23 ENCOUNTER — APPOINTMENT (OUTPATIENT)
Dept: GENERAL RADIOLOGY | Facility: CLINIC | Age: 56
End: 2021-09-23
Attending: PHYSICIAN ASSISTANT
Payer: COMMERCIAL

## 2021-09-23 ENCOUNTER — APPOINTMENT (OUTPATIENT)
Dept: PHYSICAL THERAPY | Facility: CLINIC | Age: 56
End: 2021-09-23
Attending: STUDENT IN AN ORGANIZED HEALTH CARE EDUCATION/TRAINING PROGRAM
Payer: COMMERCIAL

## 2021-09-23 LAB
ALBUMIN UR-MCNC: NEGATIVE MG/DL
ANION GAP SERPL CALCULATED.3IONS-SCNC: 7 MMOL/L (ref 3–14)
APPEARANCE UR: CLEAR
BASOPHILS # BLD AUTO: 0.1 10E3/UL (ref 0–0.2)
BASOPHILS NFR BLD AUTO: 1 %
BILIRUB UR QL STRIP: NEGATIVE
BUN SERPL-MCNC: 15 MG/DL (ref 7–30)
CALCIUM SERPL-MCNC: 9.2 MG/DL (ref 8.5–10.1)
CHLORIDE BLD-SCNC: 102 MMOL/L (ref 94–109)
CO2 SERPL-SCNC: 30 MMOL/L (ref 20–32)
COLOR UR AUTO: ABNORMAL
CREAT SERPL-MCNC: 0.64 MG/DL (ref 0.66–1.25)
CRP SERPL-MCNC: 9.9 MG/L (ref 0–8)
EOSINOPHIL # BLD AUTO: 0.3 10E3/UL (ref 0–0.7)
EOSINOPHIL NFR BLD AUTO: 2 %
ERYTHROCYTE [DISTWIDTH] IN BLOOD BY AUTOMATED COUNT: 18.6 % (ref 10–15)
GFR SERPL CREATININE-BSD FRML MDRD: >90 ML/MIN/1.73M2
GLUCOSE BLD-MCNC: 87 MG/DL (ref 70–99)
GLUCOSE BLDC GLUCOMTR-MCNC: 131 MG/DL (ref 70–99)
GLUCOSE BLDC GLUCOMTR-MCNC: 140 MG/DL (ref 70–99)
GLUCOSE BLDC GLUCOMTR-MCNC: 148 MG/DL (ref 70–99)
GLUCOSE BLDC GLUCOMTR-MCNC: 90 MG/DL (ref 70–99)
GLUCOSE UR STRIP-MCNC: NEGATIVE MG/DL
HCT VFR BLD AUTO: 32 % (ref 40–53)
HGB BLD-MCNC: 10.6 G/DL (ref 13.3–17.7)
HGB UR QL STRIP: NEGATIVE
IMM GRANULOCYTES # BLD: 0.5 10E3/UL
IMM GRANULOCYTES NFR BLD: 4 %
KETONES UR STRIP-MCNC: ABNORMAL MG/DL
LACTATE SERPL-SCNC: 4.9 MMOL/L (ref 0.7–2)
LEUKOCYTE ESTERASE UR QL STRIP: NEGATIVE
LYMPHOCYTES # BLD AUTO: 2.7 10E3/UL (ref 0.8–5.3)
LYMPHOCYTES NFR BLD AUTO: 20 %
MCH RBC QN AUTO: 29.9 PG (ref 26.5–33)
MCHC RBC AUTO-ENTMCNC: 33.1 G/DL (ref 31.5–36.5)
MCV RBC AUTO: 90 FL (ref 78–100)
MONOCYTES # BLD AUTO: 0.5 10E3/UL (ref 0–1.3)
MONOCYTES NFR BLD AUTO: 3 %
NEUTROPHILS # BLD AUTO: 9.3 10E3/UL (ref 1.6–8.3)
NEUTROPHILS NFR BLD AUTO: 70 %
NITRATE UR QL: NEGATIVE
NRBC # BLD AUTO: 0 10E3/UL
NRBC BLD AUTO-RTO: 0 /100
PH UR STRIP: 5.5 [PH] (ref 5–7)
PLATELET # BLD AUTO: 116 10E3/UL (ref 150–450)
POTASSIUM BLD-SCNC: 3.8 MMOL/L (ref 3.4–5.3)
RBC # BLD AUTO: 3.55 10E6/UL (ref 4.4–5.9)
SARS-COV-2 RNA RESP QL NAA+PROBE: NEGATIVE
SODIUM SERPL-SCNC: 139 MMOL/L (ref 133–144)
SP GR UR STRIP: 1.02 (ref 1–1.03)
UROBILINOGEN UR STRIP-MCNC: NORMAL MG/DL
WBC # BLD AUTO: 13.3 10E3/UL (ref 4–11)

## 2021-09-23 PROCEDURE — 999N000157 HC STATISTIC RCP TIME EA 10 MIN

## 2021-09-23 PROCEDURE — 71045 X-RAY EXAM CHEST 1 VIEW: CPT

## 2021-09-23 PROCEDURE — 83605 ASSAY OF LACTIC ACID: CPT | Performed by: PHYSICIAN ASSISTANT

## 2021-09-23 PROCEDURE — 258N000003 HC RX IP 258 OP 636: Performed by: PHYSICIAN ASSISTANT

## 2021-09-23 PROCEDURE — 81003 URINALYSIS AUTO W/O SCOPE: CPT | Performed by: PHYSICIAN ASSISTANT

## 2021-09-23 PROCEDURE — 250N000012 HC RX MED GY IP 250 OP 636 PS 637: Performed by: DENTIST

## 2021-09-23 PROCEDURE — 97110 THERAPEUTIC EXERCISES: CPT | Mod: GP | Performed by: PHYSICAL THERAPIST

## 2021-09-23 PROCEDURE — 250N000013 HC RX MED GY IP 250 OP 250 PS 637: Performed by: INTERNAL MEDICINE

## 2021-09-23 PROCEDURE — 85025 COMPLETE CBC W/AUTO DIFF WBC: CPT | Performed by: INTERNAL MEDICINE

## 2021-09-23 PROCEDURE — 71045 X-RAY EXAM CHEST 1 VIEW: CPT | Mod: 26 | Performed by: STUDENT IN AN ORGANIZED HEALTH CARE EDUCATION/TRAINING PROGRAM

## 2021-09-23 PROCEDURE — U0005 INFEC AGEN DETEC AMPLI PROBE: HCPCS | Performed by: PHYSICIAN ASSISTANT

## 2021-09-23 PROCEDURE — 250N000013 HC RX MED GY IP 250 OP 250 PS 637: Performed by: DENTIST

## 2021-09-23 PROCEDURE — G0463 HOSPITAL OUTPT CLINIC VISIT: HCPCS

## 2021-09-23 PROCEDURE — 99233 SBSQ HOSP IP/OBS HIGH 50: CPT | Performed by: INTERNAL MEDICINE

## 2021-09-23 PROCEDURE — 36592 COLLECT BLOOD FROM PICC: CPT | Performed by: INTERNAL MEDICINE

## 2021-09-23 PROCEDURE — 86140 C-REACTIVE PROTEIN: CPT | Performed by: PHYSICIAN ASSISTANT

## 2021-09-23 PROCEDURE — 80048 BASIC METABOLIC PNL TOTAL CA: CPT | Performed by: INTERNAL MEDICINE

## 2021-09-23 PROCEDURE — 250N000011 HC RX IP 250 OP 636: Performed by: PHYSICIAN ASSISTANT

## 2021-09-23 PROCEDURE — 83605 ASSAY OF LACTIC ACID: CPT | Performed by: INTERNAL MEDICINE

## 2021-09-23 PROCEDURE — 36592 COLLECT BLOOD FROM PICC: CPT | Performed by: PHYSICIAN ASSISTANT

## 2021-09-23 PROCEDURE — 94660 CPAP INITIATION&MGMT: CPT

## 2021-09-23 PROCEDURE — 87040 BLOOD CULTURE FOR BACTERIA: CPT | Performed by: PHYSICIAN ASSISTANT

## 2021-09-23 PROCEDURE — 120N000003 HC R&B IMCU UMMC

## 2021-09-23 PROCEDURE — 999N000215 HC STATISTIC HFNC ADULT NON-CPAP

## 2021-09-23 RX ADMIN — HEPARIN SODIUM 5000 UNITS: 5000 INJECTION, SOLUTION INTRAVENOUS; SUBCUTANEOUS at 08:42

## 2021-09-23 RX ADMIN — SODIUM CHLORIDE, POTASSIUM CHLORIDE, SODIUM LACTATE AND CALCIUM CHLORIDE 500 ML: 600; 310; 30; 20 INJECTION, SOLUTION INTRAVENOUS at 19:59

## 2021-09-23 RX ADMIN — PANTOPRAZOLE SODIUM 40 MG: 40 TABLET, DELAYED RELEASE ORAL at 08:40

## 2021-09-23 RX ADMIN — ESCITALOPRAM OXALATE 5 MG: 5 TABLET, FILM COATED ORAL at 19:46

## 2021-09-23 RX ADMIN — AMLODIPINE BESYLATE 5 MG: 5 TABLET ORAL at 08:41

## 2021-09-23 RX ADMIN — MYCOPHENOLATE MOFETIL 1000 MG: 500 TABLET, FILM COATED ORAL at 17:54

## 2021-09-23 RX ADMIN — MYCOPHENOLATE MOFETIL 1000 MG: 500 TABLET, FILM COATED ORAL at 08:40

## 2021-09-23 RX ADMIN — FLUTICASONE FUROATE 1 PUFF: 100 POWDER RESPIRATORY (INHALATION) at 08:41

## 2021-09-23 RX ADMIN — INSULIN ASPART 6 UNITS: 100 INJECTION, SOLUTION INTRAVENOUS; SUBCUTANEOUS at 17:27

## 2021-09-23 RX ADMIN — PREDNISONE 40 MG: 20 TABLET ORAL at 08:41

## 2021-09-23 RX ADMIN — AZITHROMYCIN MONOHYDRATE 250 MG: 250 TABLET ORAL at 08:41

## 2021-09-23 RX ADMIN — INSULIN ASPART 6 UNITS: 100 INJECTION, SOLUTION INTRAVENOUS; SUBCUTANEOUS at 08:43

## 2021-09-23 RX ADMIN — PANTOPRAZOLE SODIUM 40 MG: 40 TABLET, DELAYED RELEASE ORAL at 16:12

## 2021-09-23 RX ADMIN — HEPARIN SODIUM 5000 UNITS: 5000 INJECTION, SOLUTION INTRAVENOUS; SUBCUTANEOUS at 19:46

## 2021-09-23 RX ADMIN — Medication 5 MG: at 19:46

## 2021-09-23 RX ADMIN — LEVOTHYROXINE SODIUM 25 MCG: 0.03 TABLET ORAL at 08:41

## 2021-09-23 RX ADMIN — FLUTICASONE PROPIONATE 1 SPRAY: 50 SPRAY, METERED NASAL at 08:42

## 2021-09-23 ASSESSMENT — ACTIVITIES OF DAILY LIVING (ADL)
ADLS_ACUITY_SCORE: 10
ADLS_ACUITY_SCORE: 10
ADLS_ACUITY_SCORE: 12
ADLS_ACUITY_SCORE: 12
ADLS_ACUITY_SCORE: 10
ADLS_ACUITY_SCORE: 12

## 2021-09-23 ASSESSMENT — MIFFLIN-ST. JEOR: SCORE: 1414

## 2021-09-23 NOTE — PROGRESS NOTES
[facility] Nurse Inpatient Wound Assessment   Reason for consultation: Evaluate and treat  Bilateral groin  wounds    Assessment  Bilateral groin wounds due to Incontinence Associated Dermatitis (IAD)    Status: improving    Treatment Plan  Bilateral groin fold and perineal cares:    -cleanse with Marcelino Cleanse and Protect All-in-One Protectant Lotion (this is an all in one cleanser, moisturizer, and barrier ointment).  -Apply THIN layer of Critic-Aid Thick Moisture Barrier Paste to all skin that is in contact with stool or urine.  -With repeat cleansings, DO NOT scrub Critic-Aid down to skin, just gently remove surface incontinence soil and reapply additional Critic Aid, if needed.  -If complete removal of Critic-Aid is required, use a warm wash cloth and Marcelino cleanser: place the warm wash cloth to the area for a minute and then cleanse, or use mineral oil/baby oil and soft disposable wash cloth.  .    Avoid brief or pull-up in bed, use only bed underpad.      Orders Reviewed  Recommended provider order: None, at this time  [facility] Nurse follow-up plan:signing off  Nursing to notify the Provider(s) and re-consult the [facility] Nurse if wound(s) deteriorates or new skin concern.    Patient History  According to provider note(s): 56 year old male with history of NSIP associated with rheumatoid arthritis and traction bronchiectasis who was admitted on 9/5/2021 with acute on chronic hypoxic respiratory failure likely from ILD. Weaning steroids now in preparation for transplant   Objective Data  Containment of urine/stool: urinal, bed pad.  no diarrhea    Active Diet Order  Orders Placed This Encounter      Regular Diet Adult      Output:   I/O last 3 completed shifts:  In: 970 [P.O.:960; I.V.:10]  Out: 1200 [Urine:1200]    Risk Assessment:   Sensory Perception: 4-->no impairment  Moisture: 4-->rarely moist  Activity: 3-->walks occasionally  Mobility: 4-->no limitation  Nutrition: 3-->adequate  Friction and Shear: 3-->no apparent  problem  Luke Score: 21                          Labs:   Recent Labs   Lab 09/23/21  0600 09/22/21  0556 09/22/21  0556 09/20/21  0529 09/19/21  1629 09/19/21  1304 09/19/21  0550   ALBUMIN  --   --   --   --   --   --  2.2*   HGB 10.6*   < > 10.5*   < >  --    < > 9.8*   INR  --   --   --   --  0.95  --   --    WBC 13.3*   < > 16.1*   < >  --    < > 20.2*   CRP  --   --  7.4   < >  --   --  8.2*    < > = values in this interval not displayed.       Physical Exam  Areas of skin assessed: focused groin    Wound Location: bilateral groin folds    Right and Left groin folds  Date of last photo 9/6/21  Wound History: pt using urinal for voids  Using criticaid paste    Wound Base: 100 % epidermis, pink nonraised erythema      Palpation of the wound bed: normal          Temperature: normal   Odor: none  Pain: denies ,     Interventions  Visual inspection and assessment completed   Wound Care Rationale Provide protection   Wound Care: done per plan of care  Supplies: at bedside  Current off-loading measures: Pillows  Current support surface: Standard  Atmos Air mattress  Education provided to: Moisture management and Hygiene  Discussed plan of care with Patient

## 2021-09-23 NOTE — PLAN OF CARE
Neuro: A&Ox4.   Cardiac: SR-ST. VSS.   Respiratory: Sating > 92% on 45% HFNC.  GI/: Adequate urine output. BM X1  Diet/appetite: Tolerating regular diet. Eating well. ACHS blood sugars, sliding scale insulin and carb coverage given.  Activity:  SBA in room. OOB in chair during the day.  Pain: Denies pain.  Skin: No new deficits noted.  LDA's: TL PICC SL.    Plan: Continue with POC. Notify primary team with changes. Continue steroid taper in order to be lung listed.

## 2021-09-23 NOTE — PROGRESS NOTES
[facility]    Medicine Progress Note - Hospitalist Service, Gold 10       Date of Admission:  9/5/2021    Assessment & Plan    56 year old male with history of NSIP associated with rheumatoid arthritis and traction bronchiectasis who was admitted on 9/5/2021 with acute on chronic hypoxic respiratory failure likely from ILD. Weaning steroids now in preparation for transplant.     Today:   -tooth extraction in OR   -start flonase  -resume subcutaneous heparin after procedure   -To be listed for transplant Monday once prednisone dose has been tapered to 20mg      1. Acute hypoxic respiratory failure  secondary to progressive ILD  Although the patient had tachypnea and leukocytosis on 9/16/2021, sepsis has been effectively ruled out.  2 view chest x-ray showed improvement in the patient's known interstitial lung disease.  -Appreciate pulm consult  -Continue BiPAP at night, HFNC during day  -Continue azithromycin as an anti-inflammatory  -Continue prednisone taper based on recommendations of pulmonology service  -Continue MMF 1000 mg twice daily based on recommendations of pulmonology service     2.  Lung transplant candidacy  -Perimolar abscess excision completed on 9/22/2021 in OR by dentistry.   -Although the patient had indeterminate quanteferon gold, this was deemed to be low risk for active or latent tuberculosis based on evaluation of transplant infectious disease.  -The patient received ivermectin at 15 mg X1 dose for treatment of possible stronglydies based on recommendations of transplant ID  -Following pending urinary coccidoses antigen  -The patient will need monthly coccidoses antigen for 12 months after transplant  - When transplanted please send, in addition to the lungs, a mediastinal LN for pathology, fungal cx and AFB smear and cx.   -The patient will likely need to follow up with ID as an outpatient within 2 months of lung transplant to follow up on the  urinary Cocci Ag and the LN and lungs biopsies.   -Azathioprine to be avoided after transplant given low TPMT based on recommendations of transplant pulmonology, however if used, then azathioprine to be used in a low-dose     3. Moderate (Non-severe) malnutrition in the context of chronic illness, all are POA  -Nutrition consulted     4.  Thrombocytopenia with elevated reticulocyte count, not present on admission, improved  No active bleeding.  I am concerned for hemolysis, versus endorgan failure related DIC.  DIC has been effectively ruled out with normal bilirubin, elevated haptoglobin, and normal INR.  The patient thrombocytopenia has improved.     5.  Vitamin D deficiency, POA  -Continue vitamin D replacement     6. Rheumatoid arthritis  Patient seen by rheumatology this admission. No acute flare of rheumatoid arthritis. SINCERE antibodies negative, flow cytometry negative for CD19 cells, CK normal, aldolase normal. Anti-Irene-1 negative. The patient was treated with leflunomide from 5/21 to 7/21. Rheumatology considers that leflunomide toxicity might contribute to the patient's ILD. -Continue cholestyramine TID to increase leflunomide clearance.      7. Steroid induced diabetes  -Continue insulin glargine to 8 units  -Continue insulin NovoLog at medium insulin resistance     8. chronic medical problems  Hypothyroidism   Continue 25 mg levothyroxin qday     SALTY  Continue BiPAP at night.      Hypertension  On 5mg amlodipine, BP well controlled today.     Anxiety  Depression  Continue 5mg escitalopram       Diet: Snacks/Supplements Adult: Other; pt may order snacks/supplements PRN; Between Meals  Snacks/Supplements Adult: Ensure Enlive; With Meals  Regular Diet Adult    DVT Prophylaxis: Heparin SQ  Watson Catheter: Not present  Central Lines: None  Code Status: Full Code      Disposition Plan   Expected discharge: 09/29/2021   recommended to prior living arrangement once lung transplant.     The patient's care was  discussed with the Bedside Nurse and Patient.    Zoya Forrest DO  Hospitalist Service, [address]  Securely message with the [employer] Web Console (learn more here)  Text page via [facility] Paging/Directory  Please see sign in/sign out for up to date coverage information    Clinically Significant Risk Factors Present on Admission                ______________________________________________________________________    Interval History   No overnight events reported.   Seen after tooth extraction, tolerated procedure well.   Denies dyspnea, feels significantly improved from admission.   Complaining of bilateral ear fullness, chronic, from what he was previously told was fluid in his ears.     Data reviewed today: I reviewed all medications, new labs and imaging results over the last 24 hours. I personally reviewed no images or EKG's today.    Physical Exam   Vital Signs: Temp: 98.2  F (36.8  C) Temp src: Oral BP: 138/82 Pulse: 112   Resp: 22 SpO2: 93 % O2 Device: High Flow Nasal Cannula (HFNC) Oxygen Delivery: 30 LPM  Weight: 150 lbs 12.71 oz  Constitutional: no apparent distress, pleasant and cooperative   Cardiovascular: regular rate and rhythm, normal S1 and S2, no murmurs, rubs, or gallops noted, no bilateral lower extremity edema   Respiratory: diffuse crackles with wheezing LLL, normal work of breathing   GI: abdomen soft, non tender, non distended, bowel sounds present   Neuro: alert and oriented, no gross focal deficits noted     Data   Recent Labs   Lab 09/22/21  1743 09/22/21  1232 09/22/21  0923 09/22/21  0714 09/22/21  0556 09/21/21  0731 09/21/21  0440 09/20/21  0531 09/20/21  0529 09/19/21  1650 09/19/21  1629 09/19/21  0843 09/19/21  0550 09/18/21  1344 09/18/21  1218   WBC  --   --   --   --  16.1*  --  18.7*  --  17.0*   < >  --    < > 20.2*  --   --    HGB  --   --   --   --  10.5*  --  10.3*  --  10.0*   < >  --    < > 9.8*  --   --    MCV  --   --    --   --  90  --  89  --  90   < >  --    < > 90  --   --    PLT  --   --   --   --  103*  --  104*  --  100*   < >  --    < > 95*  --   --    INR  --   --   --   --   --   --   --   --   --   --  0.95  --   --   --   --    NA  --   --   --   --  139  --  139  --  137  --   --    < > 140  --   --    POTASSIUM  --   --   --   --  3.6  --  3.5  --  3.8  --   --    < > 3.4   < > 3.7   CHLORIDE  --   --   --   --  104  --  102  --  104  --   --    < > 109  --   --    CO2  --   --   --   --  31  --  29  --  29  --   --    < > 26  --   --    BUN  --   --   --   --  15  --  12  --  10  --   --    < > 12  --   --    CR  --   --   --   --  0.69  --  0.59*  --  0.62*  --   --    < > 0.52*  --   --    ANIONGAP  --   --   --   --  4  --  8  --  4  --   --    < > 5  --   --    ERIK  --   --   --   --  8.9  --  8.9  --  9.0  --   --    < > 8.6  --   --    * 201* 94   < > 86   < > 96   < > 81   < >  --    < > 127*   < >  --    ALBUMIN  --   --   --   --   --   --   --   --   --   --   --   --  2.2*  --  2.3*   PROTTOTAL  --   --   --   --   --   --   --   --   --   --   --   --  5.2*  --   --    BILITOTAL  --   --   --   --   --   --   --   --   --   --   --   --  0.2  --   --    ALKPHOS  --   --   --   --   --   --   --   --   --   --   --   --  105  --   --    ALT  --   --   --   --   --   --   --   --   --   --   --   --  74*  --   --    AST  --   --   --   --   --   --   --   --   --   --   --   --  26  --   --     < > = values in this interval not displayed.     No results found for this or any previous visit (from the past 24 hour(s)).  Medications     - MEDICATION INSTRUCTIONS -       - MEDICATION INSTRUCTIONS -         amLODIPine  5 mg Oral Daily     azithromycin  250 mg Oral Daily     escitalopram  5 mg Oral QPM     fluticasone  1 puff Inhalation Daily     [START ON 9/23/2021] fluticasone  1 spray Both Nostrils Daily     heparin ANTICOAGULANT  5,000 Units Subcutaneous Q12H     insulin aspart   Subcutaneous TID AC      insulin aspart  1-7 Units Subcutaneous TID AC     insulin aspart  1-5 Units Subcutaneous At Bedtime     insulin glargine  8 Units Subcutaneous QAM AC     levothyroxine  25 mcg Oral QAM AC     melatonin  5 mg Oral At Bedtime     mycophenolate  1,000 mg Oral BID IS     pantoprazole  40 mg Oral BID AC     predniSONE  40 mg Oral Daily    Followed by     [START ON 9/24/2021] predniSONE  30 mg Oral Daily    Followed by     [START ON 9/27/2021] predniSONE  20 mg Oral Daily     sodium chloride (PF)  3 mL Intracatheter Q8H     sodium chloride (PF)  3 mL Intracatheter Q8H     sulfamethoxazole-trimethoprim  1 tablet Oral Once per day on Mon Wed Fri     vitamin D2  50,000 Units Oral Q7 Days

## 2021-09-23 NOTE — PLAN OF CARE
Neuro: A&Ox4.   Cardiac: SR. Tachycardic to 130s upon exertion. VSS.   Respiratory: High flow nasal cannula, 45% FiO2 30L. Increased to 70% with activity.   GI/: Voiding independently.   Diet/appetite: Tolerating regular diet. Eating well.  Activity:  Standby assist. Sitting up at side of bed.  Pain: No pain reported.  Skin: Blanchable redness on bridge of nose, above ears at sites of O2 band, and on scrotal and penile areas.   LDA's: Triple lumen PICC.     Plan: Continue with POC. Notify primary team with changes.

## 2021-09-23 NOTE — PLAN OF CARE
Neuro: A&Ox4. Pleasant and cooperative. Uses call light appropriately and is able to make needs known.  Cardiac: SR. VSS.   Respiratory: Sating >92% BiPAP during the night. During the day pt switches to HFNC at 45% FiO2 on 30L. Dyspnea on exertion.  GI/: Adequate urine output. LBM 9/21  Diet/appetite: Tolerating general diet. Good appetite. Blood sugar checks ACHS.  Activity: SBA  Pain: Denies  Skin: Blanchable redness on bridge of nose and ears, coccyx. Bruising on forearm. Scrotal/penile redness  LDA's: Triple lumen PICC SL    Plan: Possibly to be placed on transplant list on Friday. Continue with POC. Notify primary team with changes.

## 2021-09-23 NOTE — PROGRESS NOTES
[facility]    Medicine Progress Note - Hospitalist Service, Gold 10       Date of Admission:  9/5/2021    Assessment & Plan    56 year old male with history of NSIP associated with rheumatoid arthritis and traction bronchiectasis who was admitted on 9/5/2021 with acute on chronic hypoxic respiratory failure likely from ILD. Weaning steroids now in preparation for transplant.     Today:   -No major changes  -Plan to list for transplant on Monday once prednisone dose <20mg       1. Acute hypoxic respiratory failure  secondary to progressive ILD  Although the patient had tachypnea and leukocytosis on 9/16/2021, sepsis has been effectively ruled out.  2 view chest x-ray showed improvement in the patient's known interstitial lung disease.  -Appreciate pulm consult  -Continue BiPAP at night, HFNC during day  -Continue azithromycin as an anti-inflammatory  -Continue prednisone taper based on recommendations of pulmonology service  -Continue MMF 1000 mg twice daily based on recommendations of pulmonology service     2.  Lung transplant candidacy  -Perimolar abscess excision completed on 9/22/2021 in OR by dentistry.   -Although the patient had indeterminate quanteferon gold, this was deemed to be low risk for active or latent tuberculosis based on evaluation of transplant infectious disease.  -The patient received ivermectin at 15 mg X1 dose for treatment of possible stronglydies based on recommendations of transplant ID  -Following pending urinary coccidoses antigen  -The patient will need monthly coccidoses antigen for 12 months after transplant  - When transplanted please send, in addition to the lungs, a mediastinal LN for pathology, fungal cx and AFB smear and cx.   -The patient will likely need to follow up with ID as an outpatient within 2 months of lung transplant to follow up on the urinary Cocci Ag and the LN and lungs biopsies.   -Azathioprine to be avoided after  transplant given low TPMT based on recommendations of transplant pulmonology, however if used, then azathioprine to be used in a low-dose     3. Moderate (Non-severe) malnutrition in the context of chronic illness, all are POA  -Nutrition consulted     4.  Thrombocytopenia with elevated reticulocyte count, not present on admission, improved  No active bleeding.  I am concerned for hemolysis, versus endorgan failure related DIC.  DIC has been effectively ruled out with normal bilirubin, elevated haptoglobin, and normal INR.  The patient thrombocytopenia has improved.     5.  Vitamin D deficiency, POA  -Continue vitamin D replacement     6. Rheumatoid arthritis  Patient seen by rheumatology this admission. No acute flare of rheumatoid arthritis. SINCERE antibodies negative, flow cytometry negative for CD19 cells, CK normal, aldolase normal. Anti-Irene-1 negative. The patient was treated with leflunomide from 5/21 to 7/21. Rheumatology considers that leflunomide toxicity might contribute to the patient's ILD. -Continue cholestyramine TID to increase leflunomide clearance.      7. Steroid induced diabetes  -Continue insulin glargine to 8 units  -Continue insulin NovoLog at medium insulin resistance     8. chronic medical problems  Hypothyroidism   Continue 25 mg levothyroxin qday     SALTY  Continue BiPAP at night.      Hypertension  On 5mg amlodipine, BP well controlled today.     Anxiety  Depression  Continue 5mg escitalopram       Diet: Snacks/Supplements Adult: Other; pt may order snacks/supplements PRN; Between Meals  Snacks/Supplements Adult: Ensure Enlive; With Meals  Regular Diet Adult    DVT Prophylaxis: Heparin SQ  Waston Catheter: Not present  Central Lines: None  Code Status: Full Code      Disposition Plan   Expected discharge: 09/29/2021   recommended to prior living arrangement once lung transplant.     The patient's care was discussed with the Bedside Nurse and Patient.    Zoya Forrest DO  Hospitalist Service,  [address]  Securely message with the Vocera Web Console (learn more here)  Text page via [facility] Paging/Directory  Please see sign in/sign out for up to date coverage information    Clinically Significant Risk Factors Present on Admission                ______________________________________________________________________    Interval History   No overnight events reported.   Continues to deny dyspnea, cough, chest pain, fevers, chills   Disappointed to hear he will not be listed for transplant until Monday     Data reviewed today: I reviewed all medications, new labs and imaging results over the last 24 hours. I personally reviewed no images or EKG's today.    Physical Exam   Vital Signs: Temp: 97.8  F (36.6  C) Temp src: Oral BP: (!) 140/92 Pulse: 113   Resp: 18 SpO2: 93 % O2 Device: High Flow Nasal Cannula (HFNC) Oxygen Delivery: 30 LPM  Weight: 148 lbs 5.91 oz  Constitutional: no apparent distress, pleasant and cooperative   Cardiovascular: regular rate and rhythm, normal S1 and S2, no murmurs, rubs, or gallops noted, no bilateral lower extremity edema   Respiratory: diffuse crackles, no wheezing or rhonchi, normal work of breathing   GI: abdomen soft, non tender, non distended, bowel sounds present   Neuro: alert and oriented, no gross focal deficits noted     Data   Recent Labs   Lab 09/23/21  1211 09/23/21  0734 09/23/21  0600 09/22/21  0714 09/22/21  0556 09/21/21  0731 09/21/21  0440 09/19/21  1650 09/19/21  1629 09/19/21  0843 09/19/21  0550 09/18/21  1344 09/18/21  1218   WBC  --   --  13.3*  --  16.1*  --  18.7*   < >  --    < > 20.2*  --   --    HGB  --   --  10.6*  --  10.5*  --  10.3*   < >  --    < > 9.8*  --   --    MCV  --   --  90  --  90  --  89   < >  --    < > 90  --   --    PLT  --   --  116*  --  103*  --  104*   < >  --    < > 95*  --   --    INR  --   --   --   --   --   --   --   --  0.95  --   --   --   --    NA  --   --  139  --  139   --  139   < >  --   --  140  --   --    POTASSIUM  --   --  3.8  --  3.6  --  3.5   < >  --   --  3.4   < > 3.7   CHLORIDE  --   --  102  --  104  --  102   < >  --   --  109  --   --    CO2  --   --  30  --  31  --  29   < >  --   --  26  --   --    BUN  --   --  15  --  15  --  12   < >  --   --  12  --   --    CR  --   --  0.64*  --  0.69  --  0.59*   < >  --   --  0.52*  --   --    ANIONGAP  --   --  7  --  4  --  8   < >  --   --  5  --   --    ERIK  --   --  9.2  --  8.9  --  8.9   < >  --   --  8.6  --   --    * 90 87   < > 86   < > 96   < >  --    < > 127*   < >  --    ALBUMIN  --   --   --   --   --   --   --   --   --   --  2.2*  --  2.3*   PROTTOTAL  --   --   --   --   --   --   --   --   --   --  5.2*  --   --    BILITOTAL  --   --   --   --   --   --   --   --   --   --  0.2  --   --    ALKPHOS  --   --   --   --   --   --   --   --   --   --  105  --   --    ALT  --   --   --   --   --   --   --   --   --   --  74*  --   --    AST  --   --   --   --   --   --   --   --   --   --  26  --   --     < > = values in this interval not displayed.     No results found for this or any previous visit (from the past 24 hour(s)).  Medications     - MEDICATION INSTRUCTIONS -       - MEDICATION INSTRUCTIONS -         amLODIPine  5 mg Oral Daily     azithromycin  250 mg Oral Daily     escitalopram  5 mg Oral QPM     fluticasone  1 puff Inhalation Daily     fluticasone  1 spray Both Nostrils Daily     heparin ANTICOAGULANT  5,000 Units Subcutaneous Q12H     insulin aspart   Subcutaneous TID AC     insulin aspart  1-7 Units Subcutaneous TID AC     insulin aspart  1-5 Units Subcutaneous At Bedtime     insulin glargine  8 Units Subcutaneous QAM AC     levothyroxine  25 mcg Oral QAM AC     melatonin  5 mg Oral At Bedtime     mycophenolate  1,000 mg Oral BID IS     pantoprazole  40 mg Oral BID AC     [START ON 9/24/2021] predniSONE  30 mg Oral Daily    Followed by     [START ON 9/27/2021] predniSONE  20 mg Oral  Daily     sodium chloride (PF)  3 mL Intracatheter Q8H     sodium chloride (PF)  3 mL Intracatheter Q8H     sulfamethoxazole-trimethoprim  1 tablet Oral Once per day on Mon Wed Fri     vitamin D2  50,000 Units Oral Q7 Days

## 2021-09-24 LAB
ANION GAP SERPL CALCULATED.3IONS-SCNC: 5 MMOL/L (ref 3–14)
BUN SERPL-MCNC: 16 MG/DL (ref 7–30)
CALCIUM SERPL-MCNC: 8.9 MG/DL (ref 8.5–10.1)
CHLORIDE BLD-SCNC: 103 MMOL/L (ref 94–109)
CO2 SERPL-SCNC: 31 MMOL/L (ref 20–32)
CREAT SERPL-MCNC: 0.57 MG/DL (ref 0.66–1.25)
CRP SERPL-MCNC: 16 MG/L (ref 0–8)
ERYTHROCYTE [DISTWIDTH] IN BLOOD BY AUTOMATED COUNT: 18.1 % (ref 10–15)
GFR SERPL CREATININE-BSD FRML MDRD: >90 ML/MIN/1.73M2
GLUCOSE BLD-MCNC: 95 MG/DL (ref 70–99)
GLUCOSE BLDC GLUCOMTR-MCNC: 122 MG/DL (ref 70–99)
GLUCOSE BLDC GLUCOMTR-MCNC: 158 MG/DL (ref 70–99)
GLUCOSE BLDC GLUCOMTR-MCNC: 183 MG/DL (ref 70–99)
GLUCOSE BLDC GLUCOMTR-MCNC: 91 MG/DL (ref 70–99)
HCT VFR BLD AUTO: 30.9 % (ref 40–53)
HGB BLD-MCNC: 9.9 G/DL (ref 13.3–17.7)
LACTATE SERPL-SCNC: 1.8 MMOL/L (ref 0.7–2)
MCH RBC QN AUTO: 29.4 PG (ref 26.5–33)
MCHC RBC AUTO-ENTMCNC: 32 G/DL (ref 31.5–36.5)
MCV RBC AUTO: 92 FL (ref 78–100)
PLATELET # BLD AUTO: 108 10E3/UL (ref 150–450)
POTASSIUM BLD-SCNC: 3.6 MMOL/L (ref 3.4–5.3)
RBC # BLD AUTO: 3.37 10E6/UL (ref 4.4–5.9)
SODIUM SERPL-SCNC: 139 MMOL/L (ref 133–144)
WBC # BLD AUTO: 12.4 10E3/UL (ref 4–11)

## 2021-09-24 PROCEDURE — 250N000012 HC RX MED GY IP 250 OP 636 PS 637: Performed by: DENTIST

## 2021-09-24 PROCEDURE — 250N000013 HC RX MED GY IP 250 OP 250 PS 637: Performed by: DENTIST

## 2021-09-24 PROCEDURE — 99232 SBSQ HOSP IP/OBS MODERATE 35: CPT | Mod: GC | Performed by: STUDENT IN AN ORGANIZED HEALTH CARE EDUCATION/TRAINING PROGRAM

## 2021-09-24 PROCEDURE — 94660 CPAP INITIATION&MGMT: CPT

## 2021-09-24 PROCEDURE — 99233 SBSQ HOSP IP/OBS HIGH 50: CPT | Performed by: INTERNAL MEDICINE

## 2021-09-24 PROCEDURE — 36592 COLLECT BLOOD FROM PICC: CPT | Performed by: INTERNAL MEDICINE

## 2021-09-24 PROCEDURE — 80048 BASIC METABOLIC PNL TOTAL CA: CPT | Performed by: INTERNAL MEDICINE

## 2021-09-24 PROCEDURE — 86140 C-REACTIVE PROTEIN: CPT | Performed by: DENTIST

## 2021-09-24 PROCEDURE — 120N000003 HC R&B IMCU UMMC

## 2021-09-24 PROCEDURE — 999N000157 HC STATISTIC RCP TIME EA 10 MIN

## 2021-09-24 PROCEDURE — 250N000011 HC RX IP 250 OP 636: Performed by: PHYSICIAN ASSISTANT

## 2021-09-24 PROCEDURE — 85027 COMPLETE CBC AUTOMATED: CPT | Performed by: INTERNAL MEDICINE

## 2021-09-24 PROCEDURE — 250N000011 HC RX IP 250 OP 636: Performed by: INTERNAL MEDICINE

## 2021-09-24 RX ADMIN — MYCOPHENOLATE MOFETIL 1000 MG: 500 TABLET, FILM COATED ORAL at 08:59

## 2021-09-24 RX ADMIN — ALTEPLASE 2 MG: 2.2 INJECTION, POWDER, LYOPHILIZED, FOR SOLUTION INTRAVENOUS at 17:49

## 2021-09-24 RX ADMIN — LEVOTHYROXINE SODIUM 25 MCG: 0.03 TABLET ORAL at 08:58

## 2021-09-24 RX ADMIN — PREDNISONE 30 MG: 20 TABLET ORAL at 08:58

## 2021-09-24 RX ADMIN — PANTOPRAZOLE SODIUM 40 MG: 40 TABLET, DELAYED RELEASE ORAL at 18:00

## 2021-09-24 RX ADMIN — ESCITALOPRAM OXALATE 5 MG: 5 TABLET, FILM COATED ORAL at 20:45

## 2021-09-24 RX ADMIN — FLUTICASONE FUROATE 1 PUFF: 100 POWDER RESPIRATORY (INHALATION) at 09:00

## 2021-09-24 RX ADMIN — HEPARIN SODIUM 5000 UNITS: 5000 INJECTION, SOLUTION INTRAVENOUS; SUBCUTANEOUS at 08:59

## 2021-09-24 RX ADMIN — FLUTICASONE PROPIONATE 1 SPRAY: 50 SPRAY, METERED NASAL at 09:01

## 2021-09-24 RX ADMIN — Medication 5 MG: at 20:45

## 2021-09-24 RX ADMIN — PANTOPRAZOLE SODIUM 40 MG: 40 TABLET, DELAYED RELEASE ORAL at 08:57

## 2021-09-24 RX ADMIN — INSULIN ASPART 7 UNITS: 100 INJECTION, SOLUTION INTRAVENOUS; SUBCUTANEOUS at 13:49

## 2021-09-24 RX ADMIN — MYCOPHENOLATE MOFETIL 1000 MG: 500 TABLET, FILM COATED ORAL at 17:58

## 2021-09-24 RX ADMIN — INSULIN ASPART 8 UNITS: 100 INJECTION, SOLUTION INTRAVENOUS; SUBCUTANEOUS at 18:51

## 2021-09-24 RX ADMIN — INSULIN ASPART 8 UNITS: 100 INJECTION, SOLUTION INTRAVENOUS; SUBCUTANEOUS at 09:10

## 2021-09-24 RX ADMIN — AZITHROMYCIN MONOHYDRATE 250 MG: 250 TABLET ORAL at 08:59

## 2021-09-24 RX ADMIN — HEPARIN SODIUM 5000 UNITS: 5000 INJECTION, SOLUTION INTRAVENOUS; SUBCUTANEOUS at 20:45

## 2021-09-24 RX ADMIN — SULFAMETHOXAZOLE AND TRIMETHOPRIM 1 TABLET: 800; 160 TABLET ORAL at 08:59

## 2021-09-24 RX ADMIN — AMLODIPINE BESYLATE 5 MG: 5 TABLET ORAL at 08:57

## 2021-09-24 RX ADMIN — ALTEPLASE 2 MG: 2.2 INJECTION, POWDER, LYOPHILIZED, FOR SOLUTION INTRAVENOUS at 17:52

## 2021-09-24 ASSESSMENT — ACTIVITIES OF DAILY LIVING (ADL)
ADLS_ACUITY_SCORE: 14
ADLS_ACUITY_SCORE: 14
ADLS_ACUITY_SCORE: 12
ADLS_ACUITY_SCORE: 14
ADLS_ACUITY_SCORE: 12
ADLS_ACUITY_SCORE: 12

## 2021-09-24 ASSESSMENT — MIFFLIN-ST. JEOR: SCORE: 1421

## 2021-09-24 NOTE — PLAN OF CARE
Neuro: A&Ox4. Afebrile.  Cardiac: SR-ST. VSS.   Respiratory: Sating >92% on 40% BiPAP.  GI/: Adequate urine output.   Diet/appetite: Tolerating general diet. Eating well. Denies N/V.  Activity:  SBA in room.   Pain: Denies.  Skin: No new deficits noted.  LDA's: Triple lumen PICC SL.   Triggered sepsis protocol, LA was 4.9. Redraw 1.8. See provider note for specifics. VSS.     Plan: Continue with POC. Notify primary team with changes.

## 2021-09-24 NOTE — PROGRESS NOTES
[facility]    Medicine Progress Note - Hospitalist Service, Gold 10       Date of Admission:  9/5/2021    Assessment & Plan    56 year old male with history of NSIP associated with rheumatoid arthritis and traction bronchiectasis who was admitted on 9/5/2021 with acute on chronic hypoxic respiratory failure likely from ILD. Weaning steroids now in preparation for transplant listing.      Today:   -Plan to list for transplant on Monday once prednisone dose <20mg    -Discussed with dental team, no indication for antibiotics post tooth extraction   -Added calorie counts, monitor weights      Acute hypoxic respiratory failure  secondary to progressive ILD  Although the patient had tachypnea and leukocytosis on 9/16/2021, sepsis has been effectively ruled out.  2 view chest x-ray showed improvement in the patient's known interstitial lung disease.  -Appreciate pulm consult  -Continue BiPAP at night, HFNC during day  -Continue azithromycin as an anti-inflammatory  -Continue prednisone taper based on recommendations of pulmonology service  -Continue MMF 1000 mg twice daily based on recommendations of pulmonology service      Lung transplant candidacy  -Perimolar abscess excision completed on 9/22/2021 in OR by dentistry.   -Although the patient had indeterminate quanteferon gold, this was deemed to be low risk for active or latent tuberculosis based on evaluation of transplant infectious disease.  -The patient received ivermectin at 15 mg X1 dose for treatment of possible stronglydies based on recommendations of transplant ID  -Following pending urinary coccidoses antigen  -The patient will need monthly coccidoses antigen for 12 months after transplant  - When transplanted please send, in addition to the lungs, a mediastinal LN for pathology, fungal cx and AFB smear and cx.   -The patient will likely need to follow up with ID as an outpatient within 2 months of lung transplant  to follow up on the urinary Cocci Ag and the LN and lungs biopsies.   -Azathioprine to be avoided after transplant given low TPMT based on recommendations of transplant pulmonology, however if used, then azathioprine to be used in a low-dose     Moderate (Non-severe) malnutrition in the context of chronic illness, all are POA  -Nutrition consulted  -Calorie counts, trend weights      Thrombocytopenia   Leukocytosis - improving   May be due to stress response, illness, medications (steroids)  -Trend CBC     Vitamin D deficiency, POA  -Continue vitamin D replacement     Rheumatoid arthritis  Patient seen by rheumatology this admission. No acute flare of rheumatoid arthritis. SINCERE antibodies negative, flow cytometry negative for CD19 cells, CK normal, aldolase normal. Anti-Irene-1 negative. The patient was treated with leflunomide from 5/21 to 7/21. Rheumatology considers that leflunomide toxicity might contribute to the patient's ILD. -Continue cholestyramine TID to increase leflunomide clearance.      Steroid induced diabetes  -Continue insulin glargine to 8 units  -Continue insulin NovoLog at medium insulin resistance     Chronic medical problems  Hypothyroidism   Continue 25 mg levothyroxin qday     SALTY  Continue BiPAP at night.      Hypertension  On 5mg amlodipine, BP well controlled today.     Anxiety  Depression  Continue 5mg escitalopram       Diet: Snacks/Supplements Adult: Other; pt may order snacks/supplements PRN; Between Meals  Snacks/Supplements Adult: Ensure Enlive; With Meals  Regular Diet Adult  Calorie Counts    DVT Prophylaxis: Heparin SQ  Watson Catheter: Not present  Central Lines: None  Code Status: Full Code      Disposition Plan   Expected discharge: 10/01/2021   recommended to prior living arrangement once lung transplant.     The patient's care was discussed with the Bedside Nurse and Patient.    Zoya Forrest DO  Hospitalist Service, [address]  Center  Securely message with the Vocera Web Console (learn more here)  Text page via [facility] Paging/Directory  Please see sign in/sign out for up to date coverage information    Clinically Significant Risk Factors Present on Admission                ______________________________________________________________________    Interval History   Overnight triggered sepsis, lactate 4.9, improved to 1.8 after small fluid bolus. Most likely related to nebs.   He continues to feel well today, no new complaints. He feels like he is eating well. Family visiting this afternoon.       Data reviewed today: I reviewed all medications, new labs and imaging results over the last 24 hours. I personally reviewed no images or EKG's today.    Physical Exam   Vital Signs: Temp: 98.5  F (36.9  C) Temp src: Oral BP: 121/67 Pulse: 112   Resp: 20 SpO2: 94 % O2 Device: High Flow Nasal Cannula (HFNC) Oxygen Delivery: 30 LPM  Weight: 149 lbs 14.6 oz  Constitutional: no apparent distress, pleasant and cooperative   Cardiovascular: regular rate and rhythm, normal S1 and S2, no murmurs, rubs, or gallops noted, no bilateral lower extremity edema   Respiratory: diffuse crackles, no wheezing or rhonchi, normal work of breathing   GI: abdomen soft, non tender, non distended, bowel sounds present   Neuro: alert and oriented, no gross focal deficits noted     Data   Recent Labs   Lab 09/24/21  1732 09/24/21  1301 09/24/21  0736 09/24/21  0639 09/23/21  2220 09/23/21  0734 09/23/21  0600 09/22/21  0714 09/22/21  0556 09/19/21  1650 09/19/21  1629 09/19/21  0843 09/19/21  0550 09/18/21  1344 09/18/21  1218   WBC  --   --   --  12.4*  --   --  13.3*  --  16.1*   < >  --    < > 20.2*  --   --    HGB  --   --   --  9.9*  --   --  10.6*  --  10.5*   < >  --    < > 9.8*  --   --    MCV  --   --   --  92  --   --  90  --  90   < >  --    < > 90  --   --    PLT  --   --   --  108*  --   --  116*  --  103*   < >  --    < > 95*  --   --    INR  --   --   --   --    --   --   --   --   --   --  0.95  --   --   --   --    NA  --   --   --  139  --   --  139  --  139   < >  --   --  140  --   --    POTASSIUM  --   --   --  3.6  --   --  3.8  --  3.6   < >  --   --  3.4   < > 3.7   CHLORIDE  --   --   --  103  --   --  102  --  104   < >  --   --  109  --   --    CO2  --   --   --  31  --   --  30  --  31   < >  --   --  26  --   --    BUN  --   --   --  16  --   --  15  --  15   < >  --   --  12  --   --    CR  --   --   --  0.57*  --   --  0.64*  --  0.69   < >  --   --  0.52*  --   --    ANIONGAP  --   --   --  5  --   --  7  --  4   < >  --   --  5  --   --    ERIK  --   --   --  8.9  --   --  9.2  --  8.9   < >  --   --  8.6  --   --    * 158* 91 95   < >   < > 87   < > 86   < >  --    < > 127*   < >  --    ALBUMIN  --   --   --   --   --   --   --   --   --   --   --   --  2.2*  --  2.3*   PROTTOTAL  --   --   --   --   --   --   --   --   --   --   --   --  5.2*  --   --    BILITOTAL  --   --   --   --   --   --   --   --   --   --   --   --  0.2  --   --    ALKPHOS  --   --   --   --   --   --   --   --   --   --   --   --  105  --   --    ALT  --   --   --   --   --   --   --   --   --   --   --   --  74*  --   --    AST  --   --   --   --   --   --   --   --   --   --   --   --  26  --   --     < > = values in this interval not displayed.     Recent Results (from the past 24 hour(s))   XR Chest Port 1 View    Narrative    EXAM: XR CHEST PORT 1 VIEW  9/23/2021 8:30 PM     HISTORY:  lactic acidosis       COMPARISON:  9/19/2021    FINDINGS: AP view of the chest. Continued decrease in diffuse mixed  interstitial and airspace opacities. Cardiomegaly, similar to priors.  Right arm PICC tip in the right atrium. No large pleural effusion. No  pneumothorax.      Impression    IMPRESSION: Continued decrease in interstitial and airspace opacities,  compared to chest x-ray 9/19/2021. Enlarged cardiomediastinal  silhouette.    I have personally reviewed the examination and  initial interpretation  and I agree with the findings.    SHANDRA CEVALLOS MD         SYSTEM ID:  [id number]     Medications     - MEDICATION INSTRUCTIONS -       - MEDICATION INSTRUCTIONS -         amLODIPine  5 mg Oral Daily     azithromycin  250 mg Oral Daily     escitalopram  5 mg Oral QPM     fluticasone  1 puff Inhalation Daily     fluticasone  1 spray Both Nostrils Daily     heparin ANTICOAGULANT  5,000 Units Subcutaneous Q12H     insulin aspart   Subcutaneous TID AC     insulin aspart  1-7 Units Subcutaneous TID AC     insulin aspart  1-5 Units Subcutaneous At Bedtime     insulin glargine  8 Units Subcutaneous QAM AC     levothyroxine  25 mcg Oral QAM AC     melatonin  5 mg Oral At Bedtime     mycophenolate  1,000 mg Oral BID IS     pantoprazole  40 mg Oral BID AC     predniSONE  30 mg Oral Daily    Followed by     [START ON 9/27/2021] predniSONE  20 mg Oral Daily     sodium chloride (PF)  3 mL Intracatheter Q8H     sodium chloride (PF)  3 mL Intracatheter Q8H     sulfamethoxazole-trimethoprim  1 tablet Oral Once per day on Mon Wed Fri     vitamin D2  50,000 Units Oral Q7 Days

## 2021-09-24 NOTE — PROGRESS NOTES
Dental Service Progress Note      Patient does not need any more antibiotics prescribed from dental following dental extraction of tooth #19  on 22/9/2021     Kole Cui DMD   PGY-1  Pager: [phone]

## 2021-09-24 NOTE — PROVIDER NOTIFICATION
09/23/21 1900   Call Information   Date of Call 09/23/21   Time of Call 1926   Name of person requesting the team Kathy   Title of person requesting team RN   RRT Arrival time 1928   Time RRT ended 1939   Reason for call   Type of RRT Adult   Primary reason for call Sepsis suspected   Sepsis Suspected Elevated Lactate level;Heart Rate > 100;WBC <4 or >12   Was patient transferred from the ED, ICU, or PACU within last 24 hours prior to RRT call? No   SBAR   Situation LA=4.9   Background  PMH ILD and rheumatoid lung disease, RA, SALTY, hypothyroid, HTN,anxiety and depression, HLD, duodenal anomaly admitted on 9/5/2021 in transfer for lung transplant workup and risk of need for ECMO.    Notable History/Conditions End-Stage disease;Hypertension   Assessment A/OX4, denies change in how he is feeling, slightly tachycardic, OVSS on HFNC with slightly higher FIO2, recently increased d/t activity   Interventions Fluid bolus;Labs   Patient Outcome   Patient Outcome Stabilized on unit   RRT Team   Attending/Primary/Covering Physician Gold 10   Date Attending Physician notified 09/23/21   Time Attending Physician notified 1926   Physician(s) Mary Jane Garrett   Lead RN Lynn Chavez   RT none   Post RRT Intervention Assessment   Post RRT Assessment Stable/Improved   Date Follow Up Done 09/24/21   Time Follow Up Done 0011   Comments VSS

## 2021-09-24 NOTE — CODE/RAPID RESPONSE
Rapid Response Team Note    Assessment   In assessment a rapid response was called on Edson Thornton due to SIRS/Sepsis trigger and lactic acidosis. This presentation is likely due to ILD exacerbation, steroids, GAIL/LABAs and worsened by chronic immunosuppression and RA.     Patient with previous intermittent lactic acidosis with negative infectious work up. Currently without any clinical changes or new focal symptoms, possibly due to medications/inhalers, but patient is high risk for infections with immunosuppression. Patient is on PCP ppx with bactrim. Recently completed 14 day course of fluconazole and cefepime course on 9/15.     Plan   -   Will give 500 ml LR bolus with repeat lactic acid in 4 hours. Repeat CRP, CXR, blood cultures x2, and UA.   -  The Internal [facility] [place] [id number] primary team was able to be reached and they are in agreement with the above plan.  -  Disposition: The patient will remain on the current unit. We will continue to monitor this patient closely.  -  Reassessment and plan follow-up will be performed by the primary team    Mary Jane Garrett PA-C  [facility] RRT [facility] Job Code Contact #[phone]    Hospital Course   Brief Summary of events leading to rapid response:   RRT was called for lactic acid of 4.9, triggered by tachycardia and leukocytosis. Patient states he is feeling well. Denies any changes in symptoms. Denies any F/C, CP, SOB at rest, N/V/D, abdominal pain, dysuria or rashes. Afebrile. Leukocytosis down trending. Had a recent dental extraction, and denies any tooth/gum pain.     Admission Diagnosis:   ILD (interstitial lung disease) (H) [J84.9]    Physical Exam   Temp: 98.4  F (36.9  C) Temp  Min: 97.8  F (36.6  C)  Max: 98.5  F (36.9  C)  Resp: 18 Resp  Min: 18  Max: 22  SpO2: 97 % SpO2  Min: 93 %  Max: 100 %  Pulse: 119 Pulse  Min: 64  Max: 119    No data recorded  BP: (!) 149/87 Systolic (24hrs), Av , Min:121 , Max:149   Diastolic (24hrs), Av, Min:71, Max:92      I/Os: I/O last 3 completed shifts:  In: 730 [P.O.:720; I.V.:10]  Out: 1350 [Urine:1350]     Exam:   General: in no acute distress  Mental Status: baseline mental status.  CV: Tachycardic rate, regular rhythm   Resp: Lungs CTA. Non-labored breathing on HFNC  Abd: Soft. Non-tender.   Extremities: No edema. WWP     Significant Results and Procedures   Lactic Acid:   Recent Labs   Lab Test 09/23/21  1911 09/21/21  2310 09/21/21  0440   LACT 4.9* 1.8 1.5     CBC:   Recent Labs   Lab Test 09/23/21  0600 09/22/21  0556 09/21/21  0440   WBC 13.3* 16.1* 18.7*   HGB 10.6* 10.5* 10.3*   HCT 32.0* 32.5* 31.0*   * 103* 104*        Sepsis Evaluation   The patient is not known to have an infection.  NO EVIDENCE OF SEPSIS at this time.  Vital sign, physical exam, and lab findings are due to Nebulizers.

## 2021-09-24 NOTE — PROGRESS NOTES
[facility]   Pulmonary   Progress Note  Edson Thornton MRN: [id number]  1965  Date of Admission:9/5/2021  Date of Service: 09/24/2021        Assessment & Plan    56-year-old male (BMI 27.6) with history of NSIP/ILD, RA (status post Rituxan infusion, leflunomide), transferred from [facility] for acute on chronic hypoxemic respiratory failure refractory to treatment. He is approved for listing by the transplant evaluation committee pending tapering of his steroids to 20 mg/day or less prior to becoming active on the transplant list.     1. Acute on chronic hypoxemic respiratory failure  2. NSIP-ILD  3. Rheumatoid arthritis (positive anti-CCP, RF)     Discussion:   Patient continues to have stable O2 requirements with slow tapering of his steroids after initiating mycophenolate treatment. Plan is to complete taper by the end of the week with patient remaining on prednisone 20 mg daily by 9/27.    Recommendations    -Continue with prednisone taper 30 mg x 3 days, 20 mg daily on 9/27  -Trend CRP q48H     We will continue to follow.     Patient seen & discussed w/  Dr. Horton.     Faisal Caban MD   Pulmonary/Critical Care Fellow   Pager #[phone]     Interval History      RN notes reviewed, no acute events overnight.  Patient feels unchanged compared to prior. He reports working with PT daily to maintain his strength, eating regularly, and calling for RN help as needed. No fever, chills, CP, night sweats, sore throat, numbness, tingling.    Five-point ROS is negative except for what is noted in the interval history.    Physical Exam Temp:  [97.4  F (36.3  C)-98.4  F (36.9  C)] 97.4  F (36.3  C)  Pulse:  [] 79  Resp:  [18-22] 20  BP: (117-149)/(72-92) 132/86  FiO2 (%):  [40 %-60 %] 50 %  SpO2:  [93 %-97 %] 97 %  I/O last 3 completed shifts:  In: 730 [P.O.:720; I.V.:10]  Out: 1350 [Urine:1350]  Wt Readings from Last 1 Encounters:   09/24/21 68 kg (149 lb 14.6 oz)    Body mass index is 25.73 kg/m . FiO2  (%): 50 %  Resp: 20      Exam:  General:  Sitting upright on HFNC, cooperative, NAD.  HEENT: Anicteric sclera. EOMI.  Lungs: Bibasilar crackles; speaking in full sentences on HFNC 45% O2 at 30 LPM.   Abd: Soft, NT, ND  Ext: WWP,  No LE edema  Skin: Plethoric appearing face, otherwise no cyanosis, or jaundice  Neuro: AAOx3, no focal deficits    Data   Labs: reviewed in EMR and notable labs listed below.  CBC RESULTS: Recent Labs   Lab Test 09/15/21  0454   WBC 23.3*   RBC 3.42*   HGB 9.8*   HCT 30.2*   MCV 88   MCH 28.7   MCHC 32.5   RDW 17.2*   *           Imaging: reviewed in EMR and notable imaging listed below.    Chest X-Ray 2 views 9/23/21  IMPRESSION: Continued decrease in interstitial and airspace opacities,  compared to chest x-ray 9/19/2021. Enlarged cardiomediastinal  silhouette.    Chest CT high resolution 9/7/21:  IMPRESSION:   1. Extensive bilateral groundglass and reticular opacities are  slightly increased compared to chest CT from 8/30/2021. This likely  represents progression of known NSIP, however superimposed infection  or cannot be excluded.  2. Small bilateral pleural effusions.  3. Stable mediastinal lymphadenopathy, likely reactive.     Medications     amLODIPine  5 mg Oral Daily     azithromycin  250 mg Oral Daily     escitalopram  5 mg Oral QPM     fluticasone  1 puff Inhalation Daily     fluticasone  1 spray Both Nostrils Daily     heparin ANTICOAGULANT  5,000 Units Subcutaneous Q12H     insulin aspart   Subcutaneous TID AC     insulin aspart  1-7 Units Subcutaneous TID AC     insulin aspart  1-5 Units Subcutaneous At Bedtime     insulin glargine  8 Units Subcutaneous QAM AC     levothyroxine  25 mcg Oral QAM AC     melatonin  5 mg Oral At Bedtime     mycophenolate  1,000 mg Oral BID IS     pantoprazole  40 mg Oral BID AC     predniSONE  30 mg Oral Daily    Followed by     [START ON 9/27/2021] predniSONE  20 mg Oral Daily     sodium chloride (PF)  3 mL Intracatheter Q8H     sodium  chloride (PF)  3 mL Intracatheter Q8H     sulfamethoxazole-trimethoprim  1 tablet Oral Once per day on Mon Wed Fri     vitamin D2  50,000 Units Oral Q7 Days

## 2021-09-25 LAB
GLUCOSE BLDC GLUCOMTR-MCNC: 111 MG/DL (ref 70–99)
GLUCOSE BLDC GLUCOMTR-MCNC: 201 MG/DL (ref 70–99)
GLUCOSE BLDC GLUCOMTR-MCNC: 212 MG/DL (ref 70–99)
GLUCOSE BLDC GLUCOMTR-MCNC: 85 MG/DL (ref 70–99)
HOLD SPECIMEN: NORMAL
LACTATE SERPL-SCNC: 1.1 MMOL/L (ref 0.7–2)
LACTATE SERPL-SCNC: 2.8 MMOL/L (ref 0.7–2)

## 2021-09-25 PROCEDURE — 250N000013 HC RX MED GY IP 250 OP 250 PS 637: Performed by: DENTIST

## 2021-09-25 PROCEDURE — 83605 ASSAY OF LACTIC ACID: CPT | Performed by: PHYSICIAN ASSISTANT

## 2021-09-25 PROCEDURE — 99232 SBSQ HOSP IP/OBS MODERATE 35: CPT | Performed by: INTERNAL MEDICINE

## 2021-09-25 PROCEDURE — 94660 CPAP INITIATION&MGMT: CPT

## 2021-09-25 PROCEDURE — 83605 ASSAY OF LACTIC ACID: CPT | Performed by: INTERNAL MEDICINE

## 2021-09-25 PROCEDURE — 36592 COLLECT BLOOD FROM PICC: CPT | Performed by: INTERNAL MEDICINE

## 2021-09-25 PROCEDURE — 99231 SBSQ HOSP IP/OBS SF/LOW 25: CPT | Performed by: PHYSICIAN ASSISTANT

## 2021-09-25 PROCEDURE — 250N000012 HC RX MED GY IP 250 OP 636 PS 637: Performed by: DENTIST

## 2021-09-25 PROCEDURE — 250N000011 HC RX IP 250 OP 636: Performed by: INTERNAL MEDICINE

## 2021-09-25 PROCEDURE — 250N000011 HC RX IP 250 OP 636: Performed by: PHYSICIAN ASSISTANT

## 2021-09-25 PROCEDURE — 999N000157 HC STATISTIC RCP TIME EA 10 MIN

## 2021-09-25 PROCEDURE — 36592 COLLECT BLOOD FROM PICC: CPT | Performed by: PHYSICIAN ASSISTANT

## 2021-09-25 PROCEDURE — 120N000003 HC R&B IMCU UMMC

## 2021-09-25 RX ORDER — HEPARIN SODIUM,PORCINE 10 UNIT/ML
5-20 VIAL (ML) INTRAVENOUS EVERY 24 HOURS
Status: DISCONTINUED | OUTPATIENT
Start: 2021-09-25 | End: 2021-10-16

## 2021-09-25 RX ORDER — HEPARIN SODIUM,PORCINE 10 UNIT/ML
5-20 VIAL (ML) INTRAVENOUS
Status: DISCONTINUED | OUTPATIENT
Start: 2021-09-25 | End: 2021-10-16

## 2021-09-25 RX ADMIN — AMLODIPINE BESYLATE 5 MG: 5 TABLET ORAL at 08:56

## 2021-09-25 RX ADMIN — AZITHROMYCIN MONOHYDRATE 250 MG: 250 TABLET ORAL at 08:56

## 2021-09-25 RX ADMIN — MYCOPHENOLATE MOFETIL 1000 MG: 500 TABLET, FILM COATED ORAL at 17:54

## 2021-09-25 RX ADMIN — ESCITALOPRAM OXALATE 5 MG: 5 TABLET, FILM COATED ORAL at 20:51

## 2021-09-25 RX ADMIN — PREDNISONE 30 MG: 20 TABLET ORAL at 08:57

## 2021-09-25 RX ADMIN — HEPARIN SODIUM 5000 UNITS: 5000 INJECTION, SOLUTION INTRAVENOUS; SUBCUTANEOUS at 20:51

## 2021-09-25 RX ADMIN — MYCOPHENOLATE MOFETIL 1000 MG: 500 TABLET, FILM COATED ORAL at 08:55

## 2021-09-25 RX ADMIN — Medication 5 MG: at 20:50

## 2021-09-25 RX ADMIN — INSULIN ASPART 7 UNITS: 100 INJECTION, SOLUTION INTRAVENOUS; SUBCUTANEOUS at 13:22

## 2021-09-25 RX ADMIN — INSULIN ASPART 7 UNITS: 100 INJECTION, SOLUTION INTRAVENOUS; SUBCUTANEOUS at 10:09

## 2021-09-25 RX ADMIN — PANTOPRAZOLE SODIUM 40 MG: 40 TABLET, DELAYED RELEASE ORAL at 08:56

## 2021-09-25 RX ADMIN — LEVOTHYROXINE SODIUM 25 MCG: 0.03 TABLET ORAL at 08:56

## 2021-09-25 RX ADMIN — INSULIN ASPART 7 UNITS: 100 INJECTION, SOLUTION INTRAVENOUS; SUBCUTANEOUS at 17:54

## 2021-09-25 RX ADMIN — HEPARIN SODIUM 5000 UNITS: 5000 INJECTION, SOLUTION INTRAVENOUS; SUBCUTANEOUS at 10:11

## 2021-09-25 RX ADMIN — FLUTICASONE FUROATE 1 PUFF: 100 POWDER RESPIRATORY (INHALATION) at 08:59

## 2021-09-25 RX ADMIN — PANTOPRAZOLE SODIUM 40 MG: 40 TABLET, DELAYED RELEASE ORAL at 17:54

## 2021-09-25 RX ADMIN — SODIUM CHLORIDE, PRESERVATIVE FREE 15 ML: 5 INJECTION INTRAVENOUS at 14:37

## 2021-09-25 RX ADMIN — FLUTICASONE PROPIONATE 1 SPRAY: 50 SPRAY, METERED NASAL at 08:58

## 2021-09-25 ASSESSMENT — ACTIVITIES OF DAILY LIVING (ADL)
ADLS_ACUITY_SCORE: 10
ADLS_ACUITY_SCORE: 6
ADLS_ACUITY_SCORE: 13
ADLS_ACUITY_SCORE: 13
ADLS_ACUITY_SCORE: 8
ADLS_ACUITY_SCORE: 8

## 2021-09-25 ASSESSMENT — MIFFLIN-ST. JEOR: SCORE: 1422

## 2021-09-25 NOTE — PROVIDER NOTIFICATION
09/25/21 0100   Call Information   Date of Call 09/25/21   Time of Call 0110   Name of person requesting the team Joyce   Title of person requesting team RN   RRT Arrival time 0113   Time RRT ended 0125   Reason for call   Type of RRT Adult   Primary reason for call Sepsis suspected   Sepsis Suspected Elevated Lactate level;WBC <4 or >12   Was patient transferred from the ED, ICU, or PACU within last 24 hours prior to RRT call? No   SBAR   Situation LA = 2.8   Background  PMH ILD and rheumatoid lung disease, RA, SALTY, hypothyroid, HTN,anxiety and depression, HLD, duodenal anomaly admitted on 9/5/2021 in transfer for lung transplant workup and risk of need for ECMO.    Notable History/Conditions End-Stage disease;Hypertension   Assessment AOx4, AVSS, denies pain, does not appear in any distress, sleeping on bipap when arrived.   Interventions Labs  (recheck lactic acid in am)   Patient Outcome   Patient Outcome Stabilized on unit   RRT Team   Physician(s) Therese Szymanski PA-C   Lead RN Eliz SULLIVAN   RT NA   Post RRT Intervention Assessment   Post RRT Assessment Other (see comment)   Date Follow Up Done 09/25/21   Time Follow Up Done 0400   Comments Will monitor for am lactic acid draw   Morning lactic acid was 1.1

## 2021-09-25 NOTE — PHARMACY-VANCOMYCIN DOSING SERVICE
Pharmacy Vancomycin Initial Note  Date of Service 2021  Patient's  1965  56 year old, male    Indication: Surgical Prophylaxis    Current estimated CrCl = Estimated Creatinine Clearance: 139.4 mL/min (A) (based on SCr of 0.57 mg/dL (L)).    Creatinine for last 3 days  2021:  6:00 AM Creatinine 0.64 mg/dL  2021:  6:39 AM Creatinine 0.57 mg/dL    Recent Vancomycin Level(s) for last 3 days  No results found for requested labs within last 72 hours.      Vancomycin IV Administrations (past 72 hours)      No vancomycin orders with administrations in past 72 hours.                Nephrotoxins and other renal medications (From now, onward)    None          Contrast Orders - past 72 hours (72h ago, onward)    None              Plan:  1. Start vancomycin  1250 mg IV q12h x 2 doses  2. Vancomycin monitoring method: Trough (Method 2 = manual dose calculation)  3. If vancomycin is ordered after 24 hrs will reevaluate for therapeutic drug monitoring goals    Terri Butler [facility]

## 2021-09-25 NOTE — PROVIDER NOTIFICATION
-------------------CRITICAL LAB VALUE-------------------    Lab Value: Lactic 2.8  Time of notification: 0115  MD notified: Gold hot cross #[id number], rapid response called per unit protocol   Patient status:  Temp:  [97.4  F (36.3  C)-98.6  F (37  C)] 97.6  F (36.4  C)  Pulse:  [] 80  Resp:  [18-22] 20  BP: (107-138)/(67-86) 128/72  FiO2 (%):  [40 %-50 %] 40 %  SpO2:  [94 %-99 %] 97 %  Orders received: awaiting call back - rapid response team at bedside     No new orders

## 2021-09-25 NOTE — PLAN OF CARE
Neuro: A&Ox4. Wears glasses.   Cardiac: SR/ST. VSS. Triggered sepsis BPA - lactic 2.8. Rapid response called per unit protocol. No new orders at this time. Lactic recheck ordered for 9/25 AM resulted at 1.1    Respiratory: Sating >90% with BiPAP 40% overnight. HFNC during the day.   GI/: Adequate urine output. Large BM x1 via commode.   Diet/appetite: Tolerating regular diet. Eating well.  Activity:  Standby assist to commode and in room. Per patient, he has been increasing his activity with exercises during the day including work with a stationary bicycle pedal.    Pain: Denies   Skin: No new deficits noted. Scrotal area cleansed and barrier cream applied per plan of care. Patient states he feels this is improving.   LDA's: Right triple lumen PICC with successful TPA administration for prior inability to draw back. All lumens now flushing and blood return noted.     Plan: Continue with POC. Notify primary team with changes. Patient is looking forward to being listed for transplant.

## 2021-09-25 NOTE — CODE/RAPID RESPONSE
Rapid Response Team Note    Assessment   In assessment a rapid response was called on Edson Thornton due to SIRS/Sepsis trigger and lactic acidosis. This presentation is likely due to ILD exacerbation, steroids, GAIL/LABAs and worsened by Chronic immunosuppression  Home O2 use  RA.     Plan   -  No further intervention indicated at this time. Will recheck lactate w/ AM draw.  -  The Internal Medicine primary team was present at the bedside and in agreement with this plan.  -  Disposition: The patient will remain on the current unit. We will continue to monitor this patient closely.  -  Reassessment and plan follow-up will be performed by the primary team      VU BARTHOLOMEW PA  [facility] RRT AMCOM Job Code Contact #[phone]    Hospital Course   Brief Summary of events leading to rapid response:   RRT called for lactic acid of 2.8, triggered by tachypnea and leukocytosis. Patient states he is feeling well. Denies changes in symptoms. No fevers or chills. No pain. Denies increased dyspnea. Leukocytosis downtrending. Has been worked up for sepsis in the setting of fluctuating lactic acidosis of unclear cause. Currently tapering down prednisone dosing.    Admission Diagnosis:   ILD (interstitial lung disease) (H) [J84.9]     Physical Exam   Temp: 97.6  F (36.4  C) Temp  Min: 97.4  F (36.3  C)  Max: 98.6  F (37  C)  Resp: 20 Resp  Min: 18  Max: 22  SpO2: 97 % SpO2  Min: 94 %  Max: 99 %  Pulse: 80 Pulse  Min: 79  Max: 112    No data recorded  BP: 128/72 Systolic (24hrs), Av , Min:107 , Max:138   Diastolic (24hrs), Av, Min:67, Max:86     I/Os: I/O last 3 completed shifts:  In: 1210 [P.O.:1200; I.V.:10]  Out: 1550 [Urine:1550]     Exam:   General: in no acute distress  Mental Status: AAOx4.  CV: RRR  Lungs: CTAB, on BiPAP.  GI: Soft, NT/ND  MSK: No edema    Significant Results and Procedures   Lactic Acid:   Recent Labs   Lab Test 21  0041 21  2357 21  1911   LACT 2.8* 1.8 4.9*     CBC:    Recent Labs   Lab Test 09/24/21  0639 09/23/21  0600 09/22/21  0556   WBC 12.4* 13.3* 16.1*   HGB 9.9* 10.6* 10.5*   HCT 30.9* 32.0* 32.5*   * 116* 103*        Sepsis Evaluation   The patient is not known to have an infection.  NO EVIDENCE OF SEPSIS at this time.  Vital sign, physical exam, and lab findings are due to ILD, medications.

## 2021-09-25 NOTE — PROGRESS NOTES
[facility]    Medicine Progress Note - Hospitalist Service, Gold 10       Date of Admission:  9/5/2021    Assessment & Plan    56 year old male with history of NSIP associated with rheumatoid arthritis and traction bronchiectasis who was admitted on 9/5/2021 with acute on chronic hypoxic respiratory failure likely from ILD. Weaning steroids now in preparation for transplant listing.      Today:   -No major changes   -Continue to encourage ambulation, adequate nutrition, calorie counts      Acute hypoxic respiratory failure  secondary to progressive ILD  Although the patient had tachypnea and leukocytosis on 9/16/2021, sepsis has been effectively ruled out.  2 view chest x-ray showed improvement in the patient's known interstitial lung disease.  -Appreciate pulm consult  -Continue BiPAP at night, HFNC during day  -Continue azithromycin as an anti-inflammatory  -Continue prednisone taper based on recommendations of pulmonology service  -Continue MMF 1000 mg twice daily based on recommendations of pulmonology service      Lung transplant candidacy  -Perimolar abscess excision completed on 9/22/2021 in OR by dentistry. Discussed with dental team 9/24, no indication for antibiotics post tooth extraction   -Although the patient had indeterminate quanteferon gold, this was deemed to be low risk for active or latent tuberculosis based on evaluation of transplant infectious disease.  -The patient received ivermectin at 15 mg X1 dose for treatment of possible stronglydies based on recommendations of transplant ID  -Following pending urinary coccidoses antigen  -The patient will need monthly coccidoses antigen for 12 months after transplant  - When transplanted please send, in addition to the lungs, a mediastinal LN for pathology, fungal cx and AFB smear and cx.   -The patient will likely need to follow up with ID as an outpatient within 2 months of lung transplant to follow up on  the urinary Cocci Ag and the LN and lungs biopsies.   -Azathioprine to be avoided after transplant given low TPMT based on recommendations of transplant pulmonology, however if used, then azathioprine to be used in a low-dose    Elevated Lactate - intermittent   Intermittently found to have elevated lactate after triggering sepsis. Usually resolved with minimal/no interventions (I.e. small fluid bolus). Unclear etiology.     Moderate (Non-severe) malnutrition in the context of chronic illness, all are POA  -Nutrition consulted  -Calorie counts, trend weights      Thrombocytopenia   Leukocytosis - improving   May be due to stress response, illness, medications (steroids)  -Trend CBC     Vitamin D deficiency, POA  -Continue vitamin D replacement     Rheumatoid arthritis  Patient seen by rheumatology this admission. No acute flare of rheumatoid arthritis. SINCERE antibodies negative, flow cytometry negative for CD19 cells, CK normal, aldolase normal. Anti-Irene-1 negative. The patient was treated with leflunomide from 5/21 to 7/21. Rheumatology considers that leflunomide toxicity might contribute to the patient's ILD. -Continue cholestyramine TID to increase leflunomide clearance.      Steroid induced diabetes  -Continue insulin glargine 8 units  -Continue insulin NovoLog at medium insulin resistance     Chronic medical problems  Hypothyroidism   Continue 25 mg levothyroxin qday     SALTY  Continue BiPAP at night.      Hypertension  On 5mg amlodipine, BP well controlled today.     Anxiety  Depression  Continue 5mg escitalopram       Diet: Snacks/Supplements Adult: Other; pt may order snacks/supplements PRN; Between Meals  Snacks/Supplements Adult: Ensure Enlive; With Meals  Regular Diet Adult  Calorie Counts    DVT Prophylaxis: Heparin SQ  Watson Catheter: Not present  Central Lines: None  Code Status: Full Code      Disposition Plan   Expected discharge: 10/01/2021   recommended to prior living arrangement once lung  transplant.     The patient's care was discussed with the Bedside Nurse and Patient.    Zoya Forrest DO  Hospitalist Service, [address]  Securely message with the Vocera Web Console (learn more here)  Text page via [facility] Paging/Directory  Please see sign in/sign out for up to date coverage information    Clinically Significant Risk Factors Present on Admission                ______________________________________________________________________    Interval History   Overnight triggered sepsis, lactate 2.8, improved to 1.1 after no interventions. Most likely related to nebs.   He continues to feel well today, no new complaints. He feels like he is eating well.     Data reviewed today: I reviewed all medications, new labs and imaging results over the last 24 hours. I personally reviewed no images or EKG's today.    Physical Exam   Vital Signs: Temp: 97.9  F (36.6  C) Temp src: Oral BP: (!) 136/93 Pulse: 109   Resp: 18 SpO2: 95 % O2 Device: BiPAP/CPAP Oxygen Delivery: 30 LPM  Weight: 150 lbs 2.13 oz  Constitutional: no apparent distress, pleasant and cooperative   Cardiovascular: regular rate and rhythm, normal S1 and S2, no murmurs, rubs, or gallops noted, no bilateral lower extremity edema   Respiratory: minimal/improving crackles throughout, no wheezing or rhonchi, normal work of breathing   GI: abdomen soft, non tender, non distended, bowel sounds present   Neuro: alert and oriented, no gross focal deficits noted     Data   Recent Labs   Lab 09/25/21  1134 09/25/21  0806 09/24/21  2225 09/24/21  0736 09/24/21  0639 09/23/21  0734 09/23/21  0600 09/22/21  0714 09/22/21  0556 09/19/21  1650 09/19/21  1629 09/19/21  0843 09/19/21  0550   WBC  --   --   --   --  12.4*  --  13.3*  --  16.1*   < >  --    < > 20.2*   HGB  --   --   --   --  9.9*  --  10.6*  --  10.5*   < >  --    < > 9.8*   MCV  --   --   --   --  92  --  90  --  90   < >  --    < > 90   PLT  --    --   --   --  108*  --  116*  --  103*   < >  --    < > 95*   INR  --   --   --   --   --   --   --   --   --   --  0.95  --   --    NA  --   --   --   --  139  --  139  --  139   < >  --   --  140   POTASSIUM  --   --   --   --  3.6  --  3.8  --  3.6   < >  --   --  3.4   CHLORIDE  --   --   --   --  103  --  102  --  104   < >  --   --  109   CO2  --   --   --   --  31  --  30  --  31   < >  --   --  26   BUN  --   --   --   --  16  --  15  --  15   < >  --   --  12   CR  --   --   --   --  0.57*  --  0.64*  --  0.69   < >  --   --  0.52*   ANIONGAP  --   --   --   --  5  --  7  --  4   < >  --   --  5   ERIK  --   --   --   --  8.9  --  9.2  --  8.9   < >  --   --  8.6   * 85 122*   < > 95   < > 87   < > 86   < >  --    < > 127*   ALBUMIN  --   --   --   --   --   --   --   --   --   --   --   --  2.2*   PROTTOTAL  --   --   --   --   --   --   --   --   --   --   --   --  5.2*   BILITOTAL  --   --   --   --   --   --   --   --   --   --   --   --  0.2   ALKPHOS  --   --   --   --   --   --   --   --   --   --   --   --  105   ALT  --   --   --   --   --   --   --   --   --   --   --   --  74*   AST  --   --   --   --   --   --   --   --   --   --   --   --  26    < > = values in this interval not displayed.     No results found for this or any previous visit (from the past 24 hour(s)).  Medications     - MEDICATION INSTRUCTIONS -       - MEDICATION INSTRUCTIONS -         amLODIPine  5 mg Oral Daily     azithromycin  250 mg Oral Daily     escitalopram  5 mg Oral QPM     fluticasone  1 puff Inhalation Daily     fluticasone  1 spray Both Nostrils Daily     heparin ANTICOAGULANT  5,000 Units Subcutaneous Q12H     heparin lock flush  5-20 mL Intracatheter Q24H     insulin aspart   Subcutaneous TID AC     insulin aspart  1-7 Units Subcutaneous TID AC     insulin aspart  1-5 Units Subcutaneous At Bedtime     insulin glargine  8 Units Subcutaneous QAM AC     levothyroxine  25 mcg Oral QAM AC     melatonin  5 mg  Oral At Bedtime     mycophenolate  1,000 mg Oral BID IS     pantoprazole  40 mg Oral BID AC     predniSONE  30 mg Oral Daily    Followed by     [START ON 9/27/2021] predniSONE  20 mg Oral Daily     sodium chloride (PF)  10-40 mL Intracatheter Q8H     sodium chloride (PF)  3 mL Intracatheter Q8H     sodium chloride (PF)  3 mL Intracatheter Q8H     sulfamethoxazole-trimethoprim  1 tablet Oral Once per day on Mon Wed Fri     vitamin D2  50,000 Units Oral Q7 Days

## 2021-09-25 NOTE — PLAN OF CARE
Neuro: A&Ox4. Pleasant and cooperative with cares. Family at bedside today.  Cardiac: SR/ST with HR 70s this am, up to 110s at rest in afternoon. 130s with activity. other VSS.   Respiratory: Sating >90% on HFNC 30L @ 45-60% FiO2. Lung sounds coarse.  GI/: Adequate urine output via urinal. BM X2  Diet/appetite: Tolerating regular diet with sliding scale and carb coverage. Eating well.  Activity:  SBA, up in room  Pain: Pt denied any pain.  Skin: No new deficits noted. Blanchable redness to nares.  LDA's:  R TL PICC with red lumen flushing well, gray and white lumens occluded with TPA dwelling per order.     Plan: Continue with POC, goal of transplant listing as soon as criteria are met. Notify primary team with changes.

## 2021-09-26 LAB
CRP SERPL-MCNC: 14 MG/L (ref 0–8)
GLUCOSE BLDC GLUCOMTR-MCNC: 109 MG/DL (ref 70–99)
GLUCOSE BLDC GLUCOMTR-MCNC: 127 MG/DL (ref 70–99)
GLUCOSE BLDC GLUCOMTR-MCNC: 135 MG/DL (ref 70–99)
GLUCOSE BLDC GLUCOMTR-MCNC: 141 MG/DL (ref 70–99)
GLUCOSE BLDC GLUCOMTR-MCNC: 87 MG/DL (ref 70–99)
LACTATE SERPL-SCNC: 2.2 MMOL/L (ref 0.7–2)

## 2021-09-26 PROCEDURE — 250N000011 HC RX IP 250 OP 636: Performed by: PHYSICIAN ASSISTANT

## 2021-09-26 PROCEDURE — 250N000012 HC RX MED GY IP 250 OP 636 PS 637: Performed by: DENTIST

## 2021-09-26 PROCEDURE — 86140 C-REACTIVE PROTEIN: CPT | Performed by: DENTIST

## 2021-09-26 PROCEDURE — 250N000013 HC RX MED GY IP 250 OP 250 PS 637: Performed by: DENTIST

## 2021-09-26 PROCEDURE — 250N000011 HC RX IP 250 OP 636: Performed by: INTERNAL MEDICINE

## 2021-09-26 PROCEDURE — 83605 ASSAY OF LACTIC ACID: CPT | Performed by: INTERNAL MEDICINE

## 2021-09-26 PROCEDURE — 999N000157 HC STATISTIC RCP TIME EA 10 MIN

## 2021-09-26 PROCEDURE — 99233 SBSQ HOSP IP/OBS HIGH 50: CPT | Performed by: INTERNAL MEDICINE

## 2021-09-26 PROCEDURE — 120N000003 HC R&B IMCU UMMC

## 2021-09-26 PROCEDURE — 36592 COLLECT BLOOD FROM PICC: CPT | Performed by: DENTIST

## 2021-09-26 PROCEDURE — 36592 COLLECT BLOOD FROM PICC: CPT | Performed by: INTERNAL MEDICINE

## 2021-09-26 RX ADMIN — FLUTICASONE FUROATE 1 PUFF: 100 POWDER RESPIRATORY (INHALATION) at 08:01

## 2021-09-26 RX ADMIN — PREDNISONE 30 MG: 20 TABLET ORAL at 07:58

## 2021-09-26 RX ADMIN — INSULIN ASPART 2 UNITS: 100 INJECTION, SOLUTION INTRAVENOUS; SUBCUTANEOUS at 08:01

## 2021-09-26 RX ADMIN — MYCOPHENOLATE MOFETIL 1000 MG: 500 TABLET, FILM COATED ORAL at 17:53

## 2021-09-26 RX ADMIN — PANTOPRAZOLE SODIUM 40 MG: 40 TABLET, DELAYED RELEASE ORAL at 07:57

## 2021-09-26 RX ADMIN — Medication 5 MG: at 19:34

## 2021-09-26 RX ADMIN — FLUTICASONE PROPIONATE 1 SPRAY: 50 SPRAY, METERED NASAL at 08:01

## 2021-09-26 RX ADMIN — PANTOPRAZOLE SODIUM 40 MG: 40 TABLET, DELAYED RELEASE ORAL at 17:58

## 2021-09-26 RX ADMIN — SODIUM CHLORIDE, PRESERVATIVE FREE 15 ML: 5 INJECTION INTRAVENOUS at 18:04

## 2021-09-26 RX ADMIN — HEPARIN SODIUM 5000 UNITS: 5000 INJECTION, SOLUTION INTRAVENOUS; SUBCUTANEOUS at 08:01

## 2021-09-26 RX ADMIN — MYCOPHENOLATE MOFETIL 1000 MG: 500 TABLET, FILM COATED ORAL at 07:59

## 2021-09-26 RX ADMIN — HEPARIN SODIUM 5000 UNITS: 5000 INJECTION, SOLUTION INTRAVENOUS; SUBCUTANEOUS at 19:33

## 2021-09-26 RX ADMIN — SODIUM CHLORIDE, PRESERVATIVE FREE 5 ML: 5 INJECTION INTRAVENOUS at 05:56

## 2021-09-26 RX ADMIN — INSULIN ASPART 12 UNITS: 100 INJECTION, SOLUTION INTRAVENOUS; SUBCUTANEOUS at 18:02

## 2021-09-26 RX ADMIN — AMLODIPINE BESYLATE 5 MG: 5 TABLET ORAL at 08:01

## 2021-09-26 RX ADMIN — LEVOTHYROXINE SODIUM 25 MCG: 0.03 TABLET ORAL at 07:58

## 2021-09-26 RX ADMIN — ESCITALOPRAM OXALATE 5 MG: 5 TABLET, FILM COATED ORAL at 19:34

## 2021-09-26 RX ADMIN — INSULIN ASPART 11 UNITS: 100 INJECTION, SOLUTION INTRAVENOUS; SUBCUTANEOUS at 14:28

## 2021-09-26 RX ADMIN — AZITHROMYCIN MONOHYDRATE 250 MG: 250 TABLET ORAL at 07:58

## 2021-09-26 ASSESSMENT — ACTIVITIES OF DAILY LIVING (ADL)
ADLS_ACUITY_SCORE: 12
ADLS_ACUITY_SCORE: 12
ADLS_ACUITY_SCORE: 8
ADLS_ACUITY_SCORE: 12

## 2021-09-26 ASSESSMENT — MIFFLIN-ST. JEOR: SCORE: 1424

## 2021-09-26 NOTE — PROGRESS NOTES
Calorie Count  Intake recorded for: 9/25  Total Kcals: 1420 Total Protein: 41g  Kcals from Hospital Food: 1420   Protein: 41g  Kcals from Outside Food (average):0 Protein: 0g  # Meals Ordered from Kitchen: 3  # Meals Recorded: 2 (First - 100% chili, beef pot roast & mashed potatoes w/ gravy, corn, side salad, Taylor Mist)  (Second - 100% peanut butter and jelly sandwich, potato chips, jello, lemon lime soda)  # Supplements Recorded: no intake recorded

## 2021-09-26 NOTE — PLAN OF CARE
Neuro: A&Ox4. Wears glasses.   Cardiac: SR. VSS.   Respiratory: Sating >90% on BiPAP 40% overnight. Dyspnea on exertion.   GI/: Adequate urine output via urinal. Last BM 9/25. Abdomen soft  Diet/appetite: Tolerating regular diet. Eating well.  Activity:  Assist of 1/standby, in room.  Pain: Denies.   Skin: No new deficits noted. Scrotal/groin fold wound care completed per order. Patient changes position independently.   LDA's: Left PICC line heparin locked.     Plan: Continue with POC. Notify primary team with changes.

## 2021-09-26 NOTE — PLAN OF CARE
Neuro: A&Ox4.   Cardiac: SR-ST with HR 70s-110s, up to 140s with activity. SBP 130s-140s. Other VSS  Respiratory: Sating 88-94 on HFNC 30 LPM @ 40% FiO2, titrated to 60 for activity. Lung sounds coarse throughout.  GI/: Adequate urine output via urinal. BM X2  Diet/appetite: Tolerating regular diet with sliding scale and carb coverage plus calorie counts. Eating well.  Activity:  Independent from bed to BSC. .  Pain: pt denied any pain.  Skin: No new deficits noted.  LDA's: R TL PICC: flushed with blood return noted and Heparin locked.    Plan: Continue with POC. Notify primary team with changes.

## 2021-09-26 NOTE — PROGRESS NOTES
[facility]    Medicine Progress Note - Hospitalist Service, Gold 10       Date of Admission:  9/5/2021    Assessment & Plan    56 year old male with history of NSIP associated with rheumatoid arthritis and traction bronchiectasis who was admitted on 9/5/2021 with acute on chronic hypoxic respiratory failure likely from ILD. Weaning steroids now in preparation for transplant listing.      Today:   -No major changes   -Continue to encourage ambulation, adequate nutrition, calorie counts   -Prednisone dose will be down to 20mg tomorrow, 9/27      Acute hypoxic respiratory failure  secondary to progressive ILD  Although the patient had tachypnea and leukocytosis on 9/16/2021, sepsis has been effectively ruled out.  2 view chest x-ray showed improvement in the patient's known interstitial lung disease.  -Appreciate pulm consult  -Continue BiPAP at night, HFNC during day  -Continue azithromycin as an anti-inflammatory  -Continue prednisone taper based on recommendations of pulmonology service  -Continue MMF 1000 mg twice daily based on recommendations of pulmonology service      Lung transplant candidacy  -Perimolar abscess excision completed on 9/22/2021 in OR by dentistry. Discussed with dental team 9/24, no indication for antibiotics post tooth extraction   -Although the patient had indeterminate quanteferon gold, this was deemed to be low risk for active or latent tuberculosis based on evaluation of transplant infectious disease.  -The patient received ivermectin at 15 mg X1 dose for treatment of possible stronglydies based on recommendations of transplant ID  -Following pending urinary coccidoses antigen  -The patient will need monthly coccidoses antigen for 12 months after transplant  - When transplanted please send, in addition to the lungs, a mediastinal LN for pathology, fungal cx and AFB smear and cx.   -The patient will likely need to follow up with ID as an  outpatient within 2 months of lung transplant to follow up on the urinary Cocci Ag and the LN and lungs biopsies.   -Azathioprine to be avoided after transplant given low TPMT based on recommendations of transplant pulmonology, however if used, then azathioprine to be used in a low-dose    Elevated Lactate - intermittent   Intermittently found to have elevated lactate after triggering sepsis. Usually resolved with minimal/no interventions (I.e. small fluid bolus). Unclear etiology.     Moderate (Non-severe) malnutrition in the context of chronic illness, all are POA  -Nutrition consulted  -Calorie counts, trend weights      Thrombocytopenia   Leukocytosis - improving   May be due to stress response, illness, medications (steroids)  -Trend CBC     Vitamin D deficiency, POA  -Continue vitamin D replacement     Rheumatoid arthritis  Patient seen by rheumatology this admission. No acute flare of rheumatoid arthritis. SINCERE antibodies negative, flow cytometry negative for CD19 cells, CK normal, aldolase normal. Anti-Irene-1 negative. The patient was treated with leflunomide from 5/21 to 7/21. Rheumatology considers that leflunomide toxicity might contribute to the patient's ILD. -Continue cholestyramine TID to increase leflunomide clearance.      Steroid induced diabetes  -Continue insulin glargine 8 units  -Continue insulin NovoLog at medium insulin resistance     Chronic medical problems  Hypothyroidism   Continue 25 mg levothyroxin qday     SALTY  Continue BiPAP at night.      Hypertension  On 5mg amlodipine, BP well controlled today.     Anxiety  Depression  Continue 5mg escitalopram       Diet: Snacks/Supplements Adult: Other; pt may order snacks/supplements PRN; Between Meals  Snacks/Supplements Adult: Ensure Enlive; With Meals  Regular Diet Adult  Calorie Counts    DVT Prophylaxis: Heparin SQ  Watson Catheter: Not present  Central Lines: None  Code Status: Full Code      Disposition Plan   Expected discharge: 10/01/2021    recommended to prior living arrangement once lung transplant.     The patient's care was discussed with the Bedside Nurse and Patient.    Zoya Forrest DO  Hospitalist Service, [address]  Securely message with the Vocera Web Console (learn more here)  Text page via [facility] Paging/Directory  Please see sign in/sign out for up to date coverage information    Clinically Significant Risk Factors Present on Admission                ______________________________________________________________________    Interval History   No overnight events reported.   He continues to feel well, no dyspnea, chest pain, fevers, cough. Reports good bowel movements.   Note he is eating more than documented in calorie counts, he reports eating 3 meals yesterday (only one documented in calorie counts).     Data reviewed today: I reviewed all medications, new labs and imaging results over the last 24 hours. I personally reviewed no images or EKG's today.    Physical Exam   Vital Signs: Temp: 98.3  F (36.8  C) Temp src: Oral BP: 132/84 Pulse: 97   Resp: 20 SpO2: 93 % O2 Device: High Flow Nasal Cannula (HFNC) Oxygen Delivery: 30 LPM  Weight: 150 lbs 9.19 oz  Constitutional: no apparent distress, pleasant and cooperative   Cardiovascular: regular rate and rhythm, normal S1 and S2, no murmurs, rubs, or gallops noted, no bilateral lower extremity edema   Respiratory: minimal/improving crackles throughout, no wheezing or rhonchi, normal work of breathing   GI: abdomen soft, non tender, non distended, bowel sounds present   Neuro: alert and oriented, no gross focal deficits noted     Data   Recent Labs   Lab 09/26/21  1333 09/26/21  1122 09/26/21  0743 09/24/21  0736 09/24/21  0639 09/23/21  0734 09/23/21  0600 09/22/21  0714 09/22/21  0556 09/19/21  1650 09/19/21  1629   WBC  --   --   --   --  12.4*  --  13.3*  --  16.1*   < >  --    HGB  --   --   --   --  9.9*  --  10.6*  --  10.5*   < >  --     MCV  --   --   --   --  92  --  90  --  90   < >  --    PLT  --   --   --   --  108*  --  116*  --  103*   < >  --    INR  --   --   --   --   --   --   --   --   --   --  0.95   NA  --   --   --   --  139  --  139  --  139   < >  --    POTASSIUM  --   --   --   --  3.6  --  3.8  --  3.6   < >  --    CHLORIDE  --   --   --   --  103  --  102  --  104   < >  --    CO2  --   --   --   --  31  --  30  --  31   < >  --    BUN  --   --   --   --  16  --  15  --  15   < >  --    CR  --   --   --   --  0.57*  --  0.64*  --  0.69   < >  --    ANIONGAP  --   --   --   --  5  --  7  --  4   < >  --    ERIK  --   --   --   --  8.9  --  9.2  --  8.9   < >  --    * 109* 87   < > 95   < > 87   < > 86   < >  --     < > = values in this interval not displayed.     No results found for this or any previous visit (from the past 24 hour(s)).  Medications     - MEDICATION INSTRUCTIONS -       - MEDICATION INSTRUCTIONS -         amLODIPine  5 mg Oral Daily     azithromycin  250 mg Oral Daily     escitalopram  5 mg Oral QPM     fluticasone  1 puff Inhalation Daily     fluticasone  1 spray Both Nostrils Daily     heparin ANTICOAGULANT  5,000 Units Subcutaneous Q12H     heparin lock flush  5-20 mL Intracatheter Q24H     insulin aspart   Subcutaneous TID AC     insulin aspart  1-7 Units Subcutaneous TID AC     insulin aspart  1-5 Units Subcutaneous At Bedtime     insulin glargine  8 Units Subcutaneous QAM AC     levothyroxine  25 mcg Oral QAM AC     melatonin  5 mg Oral At Bedtime     mycophenolate  1,000 mg Oral BID IS     pantoprazole  40 mg Oral BID AC     [START ON 9/27/2021] predniSONE  20 mg Oral Daily     sodium chloride (PF)  10-40 mL Intracatheter Q8H     sodium chloride (PF)  3 mL Intracatheter Q8H     sodium chloride (PF)  3 mL Intracatheter Q8H     sulfamethoxazole-trimethoprim  1 tablet Oral Once per day on Mon Wed Fri     vitamin D2  50,000 Units Oral Q7 Days

## 2021-09-27 ENCOUNTER — APPOINTMENT (OUTPATIENT)
Dept: PHYSICAL THERAPY | Facility: CLINIC | Age: 56
End: 2021-09-27
Attending: STUDENT IN AN ORGANIZED HEALTH CARE EDUCATION/TRAINING PROGRAM
Payer: COMMERCIAL

## 2021-09-27 ENCOUNTER — TELEPHONE (OUTPATIENT)
Dept: TRANSPLANT | Facility: CLINIC | Age: 56
End: 2021-09-27

## 2021-09-27 LAB
ANION GAP SERPL CALCULATED.3IONS-SCNC: 7 MMOL/L (ref 3–14)
BUN SERPL-MCNC: 15 MG/DL (ref 7–30)
CALCIUM SERPL-MCNC: 8.7 MG/DL (ref 8.5–10.1)
CHLORIDE BLD-SCNC: 103 MMOL/L (ref 94–109)
CO2 SERPL-SCNC: 30 MMOL/L (ref 20–32)
CREAT SERPL-MCNC: 0.6 MG/DL (ref 0.66–1.25)
ERYTHROCYTE [DISTWIDTH] IN BLOOD BY AUTOMATED COUNT: 18.3 % (ref 10–15)
GFR SERPL CREATININE-BSD FRML MDRD: >90 ML/MIN/1.73M2
GLUCOSE BLD-MCNC: 85 MG/DL (ref 70–99)
GLUCOSE BLDC GLUCOMTR-MCNC: 120 MG/DL (ref 70–99)
GLUCOSE BLDC GLUCOMTR-MCNC: 145 MG/DL (ref 70–99)
GLUCOSE BLDC GLUCOMTR-MCNC: 165 MG/DL (ref 70–99)
GLUCOSE BLDC GLUCOMTR-MCNC: 245 MG/DL (ref 70–99)
GLUCOSE BLDC GLUCOMTR-MCNC: 88 MG/DL (ref 70–99)
HCT VFR BLD AUTO: 31.2 % (ref 40–53)
HGB BLD-MCNC: 10.2 G/DL (ref 13.3–17.7)
LACTATE SERPL-SCNC: 1.1 MMOL/L (ref 0.7–2)
MCH RBC QN AUTO: 29.4 PG (ref 26.5–33)
MCHC RBC AUTO-ENTMCNC: 32.7 G/DL (ref 31.5–36.5)
MCV RBC AUTO: 90 FL (ref 78–100)
PLATELET # BLD AUTO: 150 10E3/UL (ref 150–450)
POTASSIUM BLD-SCNC: 3.4 MMOL/L (ref 3.4–5.3)
POTASSIUM BLD-SCNC: 4.4 MMOL/L (ref 3.4–5.3)
RBC # BLD AUTO: 3.47 10E6/UL (ref 4.4–5.9)
SODIUM SERPL-SCNC: 140 MMOL/L (ref 133–144)
WBC # BLD AUTO: 11.3 10E3/UL (ref 4–11)

## 2021-09-27 PROCEDURE — 250N000011 HC RX IP 250 OP 636: Performed by: PHYSICIAN ASSISTANT

## 2021-09-27 PROCEDURE — 250N000013 HC RX MED GY IP 250 OP 250 PS 637: Performed by: INTERNAL MEDICINE

## 2021-09-27 PROCEDURE — 80048 BASIC METABOLIC PNL TOTAL CA: CPT | Performed by: INTERNAL MEDICINE

## 2021-09-27 PROCEDURE — 250N000013 HC RX MED GY IP 250 OP 250 PS 637: Performed by: DENTIST

## 2021-09-27 PROCEDURE — 120N000003 HC R&B IMCU UMMC

## 2021-09-27 PROCEDURE — 84132 ASSAY OF SERUM POTASSIUM: CPT | Performed by: INTERNAL MEDICINE

## 2021-09-27 PROCEDURE — 250N000012 HC RX MED GY IP 250 OP 636 PS 637: Performed by: DENTIST

## 2021-09-27 PROCEDURE — 97110 THERAPEUTIC EXERCISES: CPT | Mod: GP

## 2021-09-27 PROCEDURE — 999N000157 HC STATISTIC RCP TIME EA 10 MIN

## 2021-09-27 PROCEDURE — 99233 SBSQ HOSP IP/OBS HIGH 50: CPT | Performed by: INTERNAL MEDICINE

## 2021-09-27 PROCEDURE — 85027 COMPLETE CBC AUTOMATED: CPT | Performed by: INTERNAL MEDICINE

## 2021-09-27 PROCEDURE — 83605 ASSAY OF LACTIC ACID: CPT | Performed by: INTERNAL MEDICINE

## 2021-09-27 PROCEDURE — 94660 CPAP INITIATION&MGMT: CPT

## 2021-09-27 PROCEDURE — 250N000011 HC RX IP 250 OP 636: Performed by: INTERNAL MEDICINE

## 2021-09-27 PROCEDURE — 999N000044 HC STATISTIC CVC DRESSING CHANGE

## 2021-09-27 PROCEDURE — 36592 COLLECT BLOOD FROM PICC: CPT | Performed by: INTERNAL MEDICINE

## 2021-09-27 RX ORDER — POTASSIUM CHLORIDE 750 MG/1
40 TABLET, EXTENDED RELEASE ORAL ONCE
Status: COMPLETED | OUTPATIENT
Start: 2021-09-27 | End: 2021-09-27

## 2021-09-27 RX ADMIN — HEPARIN SODIUM 5000 UNITS: 5000 INJECTION, SOLUTION INTRAVENOUS; SUBCUTANEOUS at 09:04

## 2021-09-27 RX ADMIN — FLUTICASONE PROPIONATE 1 SPRAY: 50 SPRAY, METERED NASAL at 08:59

## 2021-09-27 RX ADMIN — INSULIN ASPART 10 UNITS: 100 INJECTION, SOLUTION INTRAVENOUS; SUBCUTANEOUS at 10:16

## 2021-09-27 RX ADMIN — SULFAMETHOXAZOLE AND TRIMETHOPRIM 1 TABLET: 800; 160 TABLET ORAL at 08:56

## 2021-09-27 RX ADMIN — LEVOTHYROXINE SODIUM 25 MCG: 0.03 TABLET ORAL at 08:56

## 2021-09-27 RX ADMIN — FLUTICASONE FUROATE 1 PUFF: 100 POWDER RESPIRATORY (INHALATION) at 08:59

## 2021-09-27 RX ADMIN — MYCOPHENOLATE MOFETIL 1000 MG: 500 TABLET, FILM COATED ORAL at 08:56

## 2021-09-27 RX ADMIN — SODIUM CHLORIDE, PRESERVATIVE FREE 15 ML: 5 INJECTION INTRAVENOUS at 14:06

## 2021-09-27 RX ADMIN — INSULIN ASPART: 100 INJECTION, SOLUTION INTRAVENOUS; SUBCUTANEOUS at 14:03

## 2021-09-27 RX ADMIN — PANTOPRAZOLE SODIUM 40 MG: 40 TABLET, DELAYED RELEASE ORAL at 08:56

## 2021-09-27 RX ADMIN — HEPARIN SODIUM 5000 UNITS: 5000 INJECTION, SOLUTION INTRAVENOUS; SUBCUTANEOUS at 20:20

## 2021-09-27 RX ADMIN — Medication 5 MG: at 20:20

## 2021-09-27 RX ADMIN — ESCITALOPRAM OXALATE 5 MG: 5 TABLET, FILM COATED ORAL at 20:20

## 2021-09-27 RX ADMIN — AZITHROMYCIN MONOHYDRATE 250 MG: 250 TABLET ORAL at 08:56

## 2021-09-27 RX ADMIN — PREDNISONE 20 MG: 20 TABLET ORAL at 08:56

## 2021-09-27 RX ADMIN — AMLODIPINE BESYLATE 5 MG: 5 TABLET ORAL at 08:56

## 2021-09-27 RX ADMIN — POTASSIUM CHLORIDE 40 MEQ: 750 TABLET, EXTENDED RELEASE ORAL at 08:56

## 2021-09-27 RX ADMIN — MYCOPHENOLATE MOFETIL 1000 MG: 500 TABLET, FILM COATED ORAL at 17:42

## 2021-09-27 RX ADMIN — INSULIN ASPART 9 UNITS: 100 INJECTION, SOLUTION INTRAVENOUS; SUBCUTANEOUS at 19:00

## 2021-09-27 RX ADMIN — PANTOPRAZOLE SODIUM 40 MG: 40 TABLET, DELAYED RELEASE ORAL at 15:49

## 2021-09-27 ASSESSMENT — MIFFLIN-ST. JEOR: SCORE: 1425

## 2021-09-27 ASSESSMENT — ACTIVITIES OF DAILY LIVING (ADL)
ADLS_ACUITY_SCORE: 11
ADLS_ACUITY_SCORE: 9
ADLS_ACUITY_SCORE: 8
ADLS_ACUITY_SCORE: 11
ADLS_ACUITY_SCORE: 9
ADLS_ACUITY_SCORE: 13

## 2021-09-27 NOTE — PROGRESS NOTES
"Weekly status update      Transplant Phase: Transplant Evaluation complete. Plan to list as active candidate on lung transplant list today.      Brief Assessment/Chart review:  Neuro: Alert/oriented x4, pleasant.   Respiratory: HFNC: FiO2 40-50% at rest. BIPAP: FiO2 40-45%% while sleeping.  Activity: SBA - mobility limited by current O2 requirements.   PT/OT consult? Yes, following. Pt reports exercising with red and yellow bands at bedside throughout the day as able.        Fluid status:  Kidney function: Cr 0.60 (9/27/21)  Weight admission: 73.3kg --> 68.4kg today (standing scale)  BMI: 25.88 kg/m2 (Goal BMI of 18-30 per lung transplant listing guidelines)    CV:  RHC/Angio: completed 9/8/21  Read:    \"Right sided filling pressures are mildly elevated.    Moderately elevated pulmonary artery hypertension.    Left sided filling pressures are normal.    Normal cardiac output level.    Normal coronary arteries with mild tortuosity\"    ECHO with bubble completed 9/7/21  Read:   \"Interpretation Summary  Global and regional left ventricular function is normal with an EF of 60-65%.  Global right ventricular function is normal.  No significant valvular abnormalities present.  The patent foramen ovale was demonstrated by color Doppler .  Estimated pulmonary artery systolic pressure is 40 mmHg. Pulmonary  hypertension is present.  The inferior vena cava was normal in size with preserved respiratory  Variability. No pericardial effusion is present.\"      Anticoagulation:  - Heparin injections 5000 units q12h (DVT prophylaxis)    *Note: DVT prophylaxis for active candidates on lung transplant waitlist: Please order Heparin instead of Lovenox due to availability of reversal agent in the event the patient is called to the OR for transplant.       Blood transfusions:  Has not received blood products this admission.   Current Hgb: 10.2 (9/27/21)  Last PRA on 9/7/21 - UNOS cPRA of 0 (standard lung mfi)  Recommend repeating PRA 3 " "months on: 12/7/21     *If considering blood transfusion, please contact transplant team/coordinator due to risk of sensitization.         Infection:  WBC trending down, currently 11.3 today.  Pt reports no new or increased respiratory symptoms, temps <99 per chart review. No increase in O2 requirements per documentation.     - Recent surgical tooth extraction (concern for periodontal abscess) on 9/22/21 of lower left first molar with alveoloplasty on the lower left quadrant near extraction site (see dental note for further details).         Antibiotics:   - Cefepime (9/5-9/15)  - Azithromycin (9/7-current)  - Fluconazole(9/5-9/14)  - Doxycycline (9/5-9/7)  - Bactrim (9/5-current)    ----------------------  - Solumedrol 125 mg through 9/14  9/15: Prednisone 60 mg x3 days  9/18: Prednisone 50mg x3 days  9/21: Prednisone 40mg x3 days  9/24: Prednisone 30mg x3 days  9/27: Prednisone 20mg daily    *Will need to be at Prednisone 20mg/day or lower in order to be actively listed for lung transplant. Tolerating Prednisone wean well, O2 requirements stable, decreased since last week. Will plan to list today (9/27).      Mood/Behavior/Coping:  Pleasant, denied questions related to transplant at this time. Patient stated, \"I feel like I'm finally at the starting gate, now it's just a matter of time to wait for lungs.\"    Transplant education:  Completed 9/8-9/10/21. Hepatitis C paperwork completed 9/13/21, scanned copies placed in paper chart.      Mallory Beasley, RN, BSN, PHN  Inpatient Lung Transplant Coordinator  Solid Organ Transplant  [facility]  Pager: [phone]        "

## 2021-09-27 NOTE — PROVIDER NOTIFICATION
Gold hot-cross notified of LA 2.2. Code sepsis has been called. Sepsis team at bedside evaluation. MD acknowledged with no further orders. Continue to monitor.

## 2021-09-27 NOTE — PLAN OF CARE
"/79 (BP Location: Left arm)   Pulse 76   Temp 98.6  F (37  C) (Oral)   Resp 16   Ht 1.626 m (5' 4\")   Wt 68.3 kg (150 lb 9.2 oz)   SpO2 96%   BMI 25.85 kg/m      Neuro: A&Ox4.   Cardiac: SR. VSS.   Respiratory: Sating > 92 on Bipap 40% overnight. 40-65% HFNC during day..  GI/: Adequate urine output. BM X1  Diet/appetite: Tolerating Reg diet with no n/v.   Activity:  Independent to commode in room. Dyspnea with exertion.  Pain: Denies pain.  Skin: No new deficits noted.  LDA's: Right PICC TL SL.    Plan: Continue with POC. Notify primary team with changes. MD note states lung listed today (9/27).    "

## 2021-09-27 NOTE — PLAN OF CARE
"NEURO: A&O x4  VITALS: Blood pressure (!) 141/78, pulse 109, temperature 98.2  F (36.8  C), temperature source Oral, resp. rate 18, height 1.626 m (5' 4\"), weight 68.4 kg (150 lb 12.7 oz), SpO2 90 %.  PAIN: Denies  CARDIAC: SR/ST  RESPIRATORY: HFNC 45% most of day, needing up to 65% with PT workout  GI: Bowel sounds active, BM x1, adequate PO intake  : Adequate UOP  LDA: PICC  IV: SL  ACTIVITY: Up independently in room  GOALS: Get lung listed    "

## 2021-09-27 NOTE — CODE/RAPID RESPONSE
Rapid Response Team Note    Assessment   In assessment a rapid response was called on Edson Thornton due to SIRS/Sepsis trigger. This presentation is likely due to ILD and worsened by chronic steroid use.     Plan   -  Ongoing lactic acidosis likely secondary to chronic hypoxic respiratory failure 2/2 ILD.  Currently receiving systemic steroids and duo-nebs. Clinically stable, will not repeat lactic acid.  Low suspicion for infection at this time  -  The Internal Medicine primary team was able to be reached and they are in agreement with the above plan.  -  Disposition: The patient will remain on the current unit. We will continue to monitor this patient closely.  -  Reassessment and plan follow-up will be performed by the primary team      Cathy Carrera PA-C  [facility] [place] [facility] Job Code Contact #[phone]    Hospital Course   Brief Summary of events leading to rapid response:   Per Internal Medicine Progress Note: 56 year old male with history of NSIP associated with rheumatoid arthritis and traction bronchiectasis who was admitted on 2021 with acute on chronic hypoxic respiratory failure likely from ILD. Weaning steroids now in preparation for transplant listing.      Rapid response called for sepsis triggers of tachycardia, tachypnea, and a mild leukocytosis of 12.4. Patient is clinically stable, reports no change in his breathing.  He remains on HFNC 40% at 30Ls.  Plans to wean down his steroids for lung transplant listing.     Admission Diagnosis:   ILD (interstitial lung disease) (H) [J84.9]     Physical Exam   Temp: 98.4  F (36.9  C) Temp  Min: 97.1  F (36.2  C)  Max: 98.8  F (37.1  C)  Resp: 22 Resp  Min: 20  Max: 22  SpO2: 92 % SpO2  Min: 92 %  Max: 99 %  Pulse: 104 Pulse  Min: 72  Max: 113    No data recorded  BP: 129/85 Systolic (24hrs), Av , Min:129 , Max:158   Diastolic (24hrs), Av, Min:74, Max:85     I/Os: I/O last 3 completed shifts:  In: 480 [P.O.:480]  Out: 525 [Urine:525]     Exam:    GENERAL: Alert and oriented x 3. NAD. Ambulatory. Cooperative.   HEENT: Anicteric sclera. Mucous membranes moist. NC. AT.  CV: RRR. S1, S2. No murmurs appreciated.   RESPIRATORY: Effort normal on HFNC Lung sounds distant. No rales or wheezes.   GI: Abdomen soft and non distended with normoactive bowel sounds present in all quadrants. No tenderness, rebound, guarding. No lesions.   NEUROLOGICAL: No focal deficits. Moves all extremities.  CN 2-12 grossly intact.  EXTREMITIES: No peripheral edema. Intact bilateral pedal pulses.   SKIN: No jaundice. No rashes.        Significant Results and Procedures   Lactic Acid:   Recent Labs   Lab Test 09/26/21  1910 09/25/21  0556 09/25/21  0041   LACT 2.2* 1.1 2.8*     CBC:   Recent Labs   Lab Test 09/24/21  0639 09/23/21  0600 09/22/21  0556   WBC 12.4* 13.3* 16.1*   HGB 9.9* 10.6* 10.5*   HCT 30.9* 32.0* 32.5*   * 116* 103*        Sepsis Evaluation   The patient is not known to have an infection.  Edson Thornton meets SIRS criteria AND has a lactate >2 or other evidence of acute organ damage.  These vital signs, lab and physical exam findings constitute a diagnosis of SEVERE SEPSIS.    Sepsis Time-Zero (time severe sepsis diagnosis confirmed): 1920 09/26/21 as this was the time when Lactate resulted, and the level was > 2.0     Anti-infectives (From now, onward)    Start     Dose/Rate Route Frequency Ordered Stop    09/10/21 0900  sulfamethoxazole-trimethoprim (BACTRIM DS) 800-160 MG per tablet 1 tablet      1 tablet Oral Once per day on Mon Wed Fri 09/09/21 1327      09/07/21 1400  azithromycin (ZITHROMAX) tablet 250 mg      250 mg Oral DAILY 09/07/21 1343          Current antibiotic coverage is appropriate for source of infection.    3 Hour Severe Sepsis Bundle Completion:  1. Initial Lactic Acid result shown above. Repeat lactic acid is not indicated.   2. Blood Cultures before Antibiotics: No, antibiotics were started prior to BCx collection b/c waiting for BCx  to be collected would have been detrimental to the patient  3. Broad Spectrum Antibiotics Administered: no  4. Fluids: Fluid bolus not indicated due to: ILD

## 2021-09-27 NOTE — TELEPHONE ENCOUNTER
Received insurance approval to list for lung transplant.  Confirmed that Bret is ready to proceed with listing for lung transplant.  Verified blood type as A POS per transplant office protocol.   Listed in UNOS for bilateral lung transplant as was recommended by the lung transplant team.    Data used for listing:  Ht: 162.6 (9/5/2021 Admit Ht)  Wt: 68.4 kg  Date: 56.2733  Data source: Inpatient vitals flowsheet  Oxygen requirements: 30L HF Nasal Cannula 45% FiO2 uses Bipap   Diabetic status: Diabetic status unknown, using subcutaneous insulin due to steroid induced hyperglycemia  Functional status: Total Assist due to severe respiratory decompensation with all activities  Prior malignancies: None  Smoking quit date: No hx of smoking  On life support at time of listing: No  Prior cardiac surgery: None  Updated transplant tab phase status: Yes    Contacted patient to confirm listing and verified that LAS score is 56.2733 which puts him the top of the ABO A lung transplant list.    Bret will require a virtual crossmatch, and is aware that we will continue to monitor HLA antibodies with quarterly PRA levels.    Reminded Bret that the call may come at any time, patient is currently in [facility] inpatient.    Notified surgeons and coordinators on call of new listing.   Wait list notification letter sent to Bret and referring and primary care physicians. Yes

## 2021-09-27 NOTE — PROGRESS NOTES
Calorie Count  Intake recorded for: 9/26  Total Kcals: 3060 Total Protein: 128g  Kcals from Hospital Food: 3060   Protein: 128g  Kcals from Outside Food (average):0 Protein: 0g  # Meals Ordered from Kitchen: 3 meals  # Meals Recorded: 3 meals (First - 100% omelet w/ toppings, guevara, grapes, 8oz 1% milk)      (Second - 100% three bean chili w/ toppings, potato chips, egg salad sandwich w/ toppings, pudding, lemon lime soda)      (Third - 100% penne w/ meat sauce, mixed green salad, corn, mashed potatoes w/ gravy, jello, lemon lime soda)  # Supplements Recorded: 100% 2 ensure enlive

## 2021-09-27 NOTE — LETTER
2021    Edson Thornton  [address]  [place] [address]      Dear Mr. Thornton,    This letter is sent to confirm that you have completed your transplant work-up and you are a candidate in the lung transplant program at the [facility].  You were placed on the lung active waitlist on 2021.      When you are active on the waitlist and an organ becomes available, a coordinator will need to speak to you immediately.  You could be contacted at any time during the day or night as an organ could become available at any time.  Please make certain our office always has your current telephone numbers and address.      Items we will need from you:      We have received approval from your insurance company for the transplant procedure.  It is critical that you notify us if there is any change in your insurance.  It is also important that you familiarize yourself with the details of your specific insurance policy.  Our patient  is available to assist you if you should have any questions regarding your coverage.      An ALA or PRA blood sample may need to be sent here every 3 to 6 months to match you with  donors or any potential living donors.  If you need this testing, special mailing boxes (called mailers) will be sent to you directly from the Outreach Department.  You should take the physician order form and the  to your home laboratory when it is time to for this testing to be done.  Additional mailers can be obtained by calling the Transplant Office and asking to speak to a lung .      During this waiting period, we may request additional periodic laboratory tests with your primary physician.  It will be your responsibility to remind your physician to forward your results to the Transplant Office.      We need to be kept informed of any changes in your medical condition such  as:    o changes in your medications,   o significant changes in your health  o significant infections (such as pneumonia or abscesses)  o blood transfusions  o any condition which requires hospitalization  o any surgery      Remember to complete any routine cancer screening tests required before your transplant.  This includes colonoscopy; prostate screening for men, and mammogram and gynecologic testing for women, as well as dental work.  Your primary care clinic can assist you with arranging for these exams.  Remind your caregivers to forward copies of the records and final reports.    We want you to know that our program has physician and surgeon coverage 24 hours a day, 365 days a year. If this coverage changes or there are substantial program changes, you will be notified in writing by letter.     Attached is a letter from the [employer] Organ [employer]aring (UNOS). It describes the services and information offered to patients by UNOS and the Organ Procurement and Transplantation Network.    We appreciate having had the opportunity to participate in your care.  If you have questions, please feel free to call the Transplant Office at [phone] or [phone].      Sincerely,      Solid Organ Transplant  [facility], [facility]      Enclosures: UNOS Letter  cc: Care Team                          The Organ Procurement and Transplantation Network  Toll-free patient services line:     Your resource for organ transplant information    If you have a question regarding your own medical care, you always should call your transplant hospital first. However, for general organ transplant-related information, you can call the Organ Procurement and Transplantation Network (OPTN) toll-free patient services line at [phone]. Anyone, including potential transplant candidates, candidates, recipients, family members, friends,  living donors, and donor family members, can call this number to:          Talk about organ donation, living donation, the transplant process, the donation process, and transplant policies.    Get a free patient information kit with helpful booklets, waiting list and transplant information, and a list of all transplant hospitals.    Ask questions about the OPTN website (https://optn.transplant.hrsa.gov/), the [employer] for Organ Sharing s (UNOS) website (https://unos.org/), or the UNOS website for living donors and transplant recipients. (https://www.transplantliving.org/).    Learn how the OPTN can help you.    Talk about any concerns that you may have with a transplant hospital.    The [facility] transplant system, the OPTN, is managed under federal contract by the [employer] Organ Sharing (UNOS), which is a non-profit charitable organization. The OPTN helps create and define organ sharing policies that make the best use of donated organs. This process continuously evaluating new advances and discoveries so policies can be adapted to best serve patients waiting for transplants. To do so, the OPTN works closely with transplant professionals, transplant patients, transplant candidates, donor families, living donors, and the public. All transplant programs and organ procurement organizations throughout the country are OPTN members and are obligated to follow the policies the OPTN creates for allocating organs.    The OPTN also is responsible for:      Providing educational material for patients, the public, and professionals.    Raising awareness of the need for donated organs and tissue.    Coordinating organ procurement, matching, and placement.    Collecting information about every organ transplant and donation that occurs in the [place].    Remember, you should contact your transplant hospital directly if you have questions or concerns about your own medical care including medical records,  work-up progress, and test results.    We are not your transplant hospital, and our staff will not be able to answer questions about your case, so please keep your transplant hospital s phone number handy.    However, while you research your transplant needs and learn as much as you can about transplantation and donation, we welcome your call to our toll-free patient services line at [phone].          Updated 4/1/2019

## 2021-09-27 NOTE — PLAN OF CARE
Neuro: A&Ox4. Pleasant with cares.  Cardiac: SR/ST with HR 80s-110s, more tachy in afternoon. Afebrile.  Respiratory: Sating 88-94% on HFNC 30 LPM @ 40% FiO2, up to 60% FiO2 with activity. Lung sounds coarse.  GI/: Adequate urine output via urinal, BM X1  Diet/appetite: Tolerating regular diet with sliding scale plus carb coverage. Eating well.  Activity: Independent up to BSC.  Pain: Denies   Skin: No new deficits noted.  LDA's: R TL PICC: Heparin locked.     Plan: Transplant listing likely tomorrow 9/27. Continue with POC. Notify primary team with changes.

## 2021-09-27 NOTE — PROGRESS NOTES
CLINICAL NUTRITION SERVICES - REASSESSMENT NOTE     Nutrition Prescription    RECOMMENDATIONS FOR MDs/PROVIDERS TO ORDER:  Encourage oral intake, minimize NPO days     Malnutrition Status:    Moderate (Non-severe) malnutrition in the context of chronic illness     Recommendations already ordered by Registered Dietitian (RD):  Continue supplements PRN     Future/Additional Recommendations:  Will continue to revise estimated energy/protein needs pending weight trends   Continue to monitor appetite, oral intake, use of supplements   Need for additional transplant nutrition education      If enteral nutrition is needed:   Pre transplant: Osmolite 1.5 Conner @ goal of  50ml/hr (1200 ml/day) + 1 packet prosource  will provide: 1840 kcals (28 kcal/kg), 86g PRO (1.3) , 914 ml free H20, 244 g CHO, and 0 g fiber daily.     Post transplant: Osmolite 1.5 Conner @ goal of  60ml/hr (1440ml/day) + 1 packet prosurce  will provide: 2200 kcals (34 kca/kg), 101 g PRO (1.5 g/kg), 1097 ml free H20, 293 g CHO, and 0 g fiber daily.     EVALUATION OF THE PROGRESS TOWARD GOALS   Diet: Regular + Ensure Enlive PRN   Intake: 100 %    Calorie count 9/25-9/27 9/25   Total Kcals: 1420    Total Protein: 41g, 3 meals ordered, 2 documented  9/26   Total Kcals: 3060    Total Protein: 128g,3 meals ordered, 3 documented     NEW FINDINGS   Attempted to visit with patient multiple times this morning- busy with visitors/other cares. Information obtained from chart.     Weight Trends:  09/27/21 0554 68.4 kg (150 lb 12.7 oz)   09/24/21 0500 68 kg (149 lb 14.6 oz)   09/20/21 0041 65.2 kg (143 lb 11.8 oz)   09/15/21 0650 65 kg (143 lb 4.8 oz)   09/13/21 0020 64 kg (141 lb 1.5 oz)   09/11/21 0347 68.5 kg (151 lb 0.2 oz)   09/05/21 1752 73.3 kg (161 lb 8 oz)   Will maintain dosing weight of 64 kg (lowest admission weight)     GI: No nausea noted. Last bowel movement 9/26.   Skin: Blanchable redness on coccyx, Luke 21  Labs: Creatinine 0.6 (L), Glucose  (past  4)    Medications: Novolog, Lantus, Levothyroxine, Protonix, Prednisone      MALNUTRITION  % Intake: No decreased intake noted  % Weight Loss: > 5% in 1 month (severe)  Subcutaneous Fat Loss: None observed, per previus assessment 9/20  Muscle Loss: Temporal:  mild, Upper arm (bicep, tricep):  mild and Dorsal hand:  moderate, per previus assessment 9/20  Fluid Accumulation/Edema: None noted  Malnutrition Diagnosis: Moderate (Non-severe) malnutrition in the context of chronic illness     Previous Goals   Patient to consume % of nutritionally adequate meal trays TID, or the equivalent with supplements/snacks.  Evaluation: Met    Previous Nutrition Diagnosis  Inadequate protein intake related to food choices as evidenced by patient meeting 93% of low end energy needs but 52% of low end protein needs  Evaluation: Improving    CURRENT NUTRITION DIAGNOSIS  Predicted inadequate nutrient intake (energy/protein) related to prolonged hospitalization as evidenced by day 22 with limited menu     INTERVENTIONS  Implementation  Collaboration with other providers- 6B rounds     Goals  Patient to consume % of nutritionally adequate meal trays TID, or the equivalent with supplements/snacks.    Monitoring/Evaluation  Progress toward goals will be monitored and evaluated per protocol.    Janny Khan RD, LD  6B pager: [phone]

## 2021-09-28 LAB
GLUCOSE BLDC GLUCOMTR-MCNC: 100 MG/DL (ref 70–99)
GLUCOSE BLDC GLUCOMTR-MCNC: 103 MG/DL (ref 70–99)
GLUCOSE BLDC GLUCOMTR-MCNC: 137 MG/DL (ref 70–99)
GLUCOSE BLDC GLUCOMTR-MCNC: 156 MG/DL (ref 70–99)
GLUCOSE BLDC GLUCOMTR-MCNC: 88 MG/DL (ref 70–99)
HOLD SPECIMEN: NORMAL
POTASSIUM BLD-SCNC: 3.7 MMOL/L (ref 3.4–5.3)

## 2021-09-28 PROCEDURE — 250N000012 HC RX MED GY IP 250 OP 636 PS 637: Performed by: DENTIST

## 2021-09-28 PROCEDURE — 84132 ASSAY OF SERUM POTASSIUM: CPT | Performed by: INTERNAL MEDICINE

## 2021-09-28 PROCEDURE — 250N000011 HC RX IP 250 OP 636: Performed by: INTERNAL MEDICINE

## 2021-09-28 PROCEDURE — 36592 COLLECT BLOOD FROM PICC: CPT | Performed by: INTERNAL MEDICINE

## 2021-09-28 PROCEDURE — 99233 SBSQ HOSP IP/OBS HIGH 50: CPT | Performed by: STUDENT IN AN ORGANIZED HEALTH CARE EDUCATION/TRAINING PROGRAM

## 2021-09-28 PROCEDURE — 250N000013 HC RX MED GY IP 250 OP 250 PS 637: Performed by: DENTIST

## 2021-09-28 PROCEDURE — 120N000003 HC R&B IMCU UMMC

## 2021-09-28 PROCEDURE — 250N000011 HC RX IP 250 OP 636: Performed by: PHYSICIAN ASSISTANT

## 2021-09-28 PROCEDURE — 99232 SBSQ HOSP IP/OBS MODERATE 35: CPT | Performed by: INTERNAL MEDICINE

## 2021-09-28 PROCEDURE — 99232 SBSQ HOSP IP/OBS MODERATE 35: CPT | Mod: GC | Performed by: INTERNAL MEDICINE

## 2021-09-28 RX ADMIN — PANTOPRAZOLE SODIUM 40 MG: 40 TABLET, DELAYED RELEASE ORAL at 16:19

## 2021-09-28 RX ADMIN — INSULIN ASPART 6 UNITS: 100 INJECTION, SOLUTION INTRAVENOUS; SUBCUTANEOUS at 20:33

## 2021-09-28 RX ADMIN — PANTOPRAZOLE SODIUM 40 MG: 40 TABLET, DELAYED RELEASE ORAL at 09:18

## 2021-09-28 RX ADMIN — Medication 5 MG: at 20:34

## 2021-09-28 RX ADMIN — HEPARIN SODIUM 5000 UNITS: 5000 INJECTION, SOLUTION INTRAVENOUS; SUBCUTANEOUS at 09:19

## 2021-09-28 RX ADMIN — MYCOPHENOLATE MOFETIL 1000 MG: 500 TABLET, FILM COATED ORAL at 18:38

## 2021-09-28 RX ADMIN — LEVOTHYROXINE SODIUM 25 MCG: 0.03 TABLET ORAL at 09:18

## 2021-09-28 RX ADMIN — INSULIN ASPART 5 UNITS: 100 INJECTION, SOLUTION INTRAVENOUS; SUBCUTANEOUS at 13:46

## 2021-09-28 RX ADMIN — HEPARIN SODIUM 5000 UNITS: 5000 INJECTION, SOLUTION INTRAVENOUS; SUBCUTANEOUS at 20:34

## 2021-09-28 RX ADMIN — PREDNISONE 20 MG: 20 TABLET ORAL at 09:18

## 2021-09-28 RX ADMIN — AMLODIPINE BESYLATE 5 MG: 5 TABLET ORAL at 09:18

## 2021-09-28 RX ADMIN — AZITHROMYCIN MONOHYDRATE 250 MG: 250 TABLET ORAL at 09:18

## 2021-09-28 RX ADMIN — INSULIN ASPART 3 UNITS: 100 INJECTION, SOLUTION INTRAVENOUS; SUBCUTANEOUS at 09:13

## 2021-09-28 RX ADMIN — FLUTICASONE FUROATE 1 PUFF: 100 POWDER RESPIRATORY (INHALATION) at 09:27

## 2021-09-28 RX ADMIN — SODIUM CHLORIDE, PRESERVATIVE FREE 5 ML: 5 INJECTION INTRAVENOUS at 05:24

## 2021-09-28 RX ADMIN — SODIUM CHLORIDE, PRESERVATIVE FREE 15 ML: 5 INJECTION INTRAVENOUS at 13:48

## 2021-09-28 RX ADMIN — MYCOPHENOLATE MOFETIL 1000 MG: 500 TABLET, FILM COATED ORAL at 09:19

## 2021-09-28 RX ADMIN — FLUTICASONE PROPIONATE 1 SPRAY: 50 SPRAY, METERED NASAL at 09:27

## 2021-09-28 RX ADMIN — ESCITALOPRAM OXALATE 5 MG: 5 TABLET, FILM COATED ORAL at 20:34

## 2021-09-28 ASSESSMENT — ACTIVITIES OF DAILY LIVING (ADL)
ADLS_ACUITY_SCORE: 13
ADLS_ACUITY_SCORE: 12
ADLS_ACUITY_SCORE: 12
ADLS_ACUITY_SCORE: 13

## 2021-09-28 ASSESSMENT — MIFFLIN-ST. JEOR: SCORE: 1430

## 2021-09-28 NOTE — PLAN OF CARE
RT note:    Bed side spirometry result;      FEV1          1.62L -  43% of  predicted  FVC            2.18L -  57% of  prediced  FEV1/FVC  80%  Of  100%  Predicted  FEF            7.07 L/S   74% of predcted

## 2021-09-28 NOTE — PLAN OF CARE
Neuro: A&Ox4. Patient is calm and cooperative with staff. HOLLEY.  Cardiac: SR/ST. HR ranges from low 70s to 100s.  Respiratory: Sating 99% on BiPAP 40% FiO2. Wears HFNC during the day, BiPAP at night.  GI/: Adequate urine output. No BM this shift.  Diet/appetite: Tolerating Regular diet.  Activity:  Stand-by assistance, up to chair and commode.  Pain: At acceptable level on current regimen.   Skin: No new deficits noted.  LDA's: R-PICC triple lumen, saline locked. All caps changed, next change due 10/1/21. BiPAP 40% FiO2.     Plan: Continue with POC. Notify primary team with changes.

## 2021-09-28 NOTE — PROGRESS NOTES
[facility]    Medicine Progress Note - Hospitalist Service, Gold 10       Date of Admission:  9/5/2021    Assessment & Plan       56 year old male with history of NSIP associated with rheumatoid arthritis and traction bronchiectasis who was admitted on 9/5/2021 with acute on chronic hypoxic respiratory failure likely from ILD. Weaning steroids now in preparation for transplant listing.      Acute hypoxic respiratory failure  secondary to progressive ILD  Although the patient had tachypnea and leukocytosis on 9/16/2021, sepsis has been effectively ruled out.  2 view chest x-ray showed improvement in the patient's known interstitial lung disease.  -Appreciate pulm consult  -Continue BiPAP at night, HFNC during day  -Continue azithromycin as an anti-inflammatory  -Continue prednisone taper based on recommendations of pulmonology service (currently on 20 mg daily)  -Continue MMF 1000 mg twice daily based on recommendations of pulmonology service      Lung transplant candidacy  -Perimolar abscess excision completed on 9/22/2021 in OR by dentistry. Discussed with dental team 9/24, no indication for antibiotics post tooth extraction   -Although the patient had indeterminate quanteferon gold, this was deemed to be low risk for active or latent tuberculosis based on evaluation of transplant infectious disease.  -The patient received ivermectin at 15 mg X1 dose for treatment of possible stronglydies based on recommendations of transplant ID  -Following pending urinary coccidoses antigen  -The patient will need monthly coccidoses antigen for 12 months after transplant  - When transplanted please send, in addition to the lungs, a mediastinal LN for pathology, fungal cx and AFB smear and cx.   -The patient will likely need to follow up with ID as an outpatient within 2 months of lung transplant to follow up on the urinary Cocci Ag and the LN and lungs biopsies.   -Azathioprine to be  avoided after transplant given low TPMT based on recommendations of transplant pulmonology, however if used, then azathioprine to be used in a low-dose     Elevated Lactate - intermittent   Intermittently found to have elevated lactate after triggering sepsis. Usually resolved with minimal/no interventions (I.e. small fluid bolus). Unclear etiology.      Moderate (Non-severe) malnutrition in the context of chronic illness, all are POA  -Nutrition consulted  -Calorie counts, trend weights      Thrombocytopenia   Leukocytosis - improving   May be due to stress response, illness, medications (steroids)  -Trend CBC      Vitamin D deficiency, POA  -Continue vitamin D replacement     Rheumatoid arthritis  Patient seen by rheumatology this admission. No acute flare of rheumatoid arthritis. SINCERE antibodies negative, flow cytometry negative for CD19 cells, CK normal, aldolase normal. Anti-Irene-1 negative. The patient was treated with leflunomide from 5/21 to 7/21. Rheumatology considers that leflunomide toxicity might contribute to the patient's ILD.   -Continue cholestyramine TID to increase leflunomide clearance.      Steroid induced diabetes  -Continue insulin glargine 8 units  -Continue insulin NovoLog at medium insulin resistance     Chronic medical problems  Hypothyroidism   Continue 25 mg levothyroxin qday     SALTY  Continue BiPAP at night.      Hypertension  On 5mg amlodipine, BP well controlled today.     Anxiety  Depression  Continue 5mg escitalopram        Diet: Snacks/Supplements Adult: Other; pt may order snacks/supplements PRN; Between Meals  Snacks/Supplements Adult: Ensure Enlive; With Meals  Regular Diet Adult    DVT Prophylaxis: Heparin SQ  Watson Catheter: Not present  Central Lines: None  Code Status: Full Code      Disposition Plan   Expected discharge: 10/05/2021   recommended to prior living arrangement once Possible lung transplant occurs.     The patient's care was discussed with the Patient and Pulmonary  transplant team.    Rodrigo Mckinnon MD  Hospitalist Service, [address]  Securely message with the [employer] Web Console (learn more here)  Text page via [employer] Paging/Directory  Please see sign in/sign out for up to date coverage information    Clinically Significant Risk Factors Present on Admission                ______________________________________________________________________    Interval History   Doing okay overall.  No overnight events.  No chest pain or abdominal pain.  No shortness of breath.  No fever or cough.    Data reviewed today: I reviewed all medications, new labs and imaging results over the last 24 hours. I personally reviewed no images or EKG's today.    Physical Exam   Vital Signs: Temp: 98.5  F (36.9  C) Temp src: Oral BP: 133/82 Pulse: 107   Resp: 20 SpO2: 95 % O2 Device: High Flow Nasal Cannula (HFNC) Oxygen Delivery: 30 LPM  Weight: 151 lbs 14.35 oz  Constitutional: no apparent distress, pleasant and cooperative, sitting back in bed  Cardiovascular: RRR, normal S1 and S2, no murmurs, rubs, or gallops noted, no bilateral LE edema   Respiratory: Slight crackles in the posterior lung bases, no wheezing or rhonchi, normal work of breathing   GI: abdomen soft, non tender, non distended, bowel sounds present   Neuro: alert and oriented, no gross focal deficits noted     Data   Recent Labs   Lab 09/28/21  1657 09/28/21  1249 09/28/21  0811 09/28/21  0528 09/28/21  0329 09/27/21  1541 09/27/21  1329 09/27/21  0821 09/27/21  0639 09/24/21  0736 09/24/21  0639 09/23/21  0734 09/23/21  0600   WBC  --   --   --   --   --   --   --   --  11.3*  --  12.4*  --  13.3*   HGB  --   --   --   --   --   --   --   --  10.2*  --  9.9*  --  10.6*   MCV  --   --   --   --   --   --   --   --  90  --  92  --  90   PLT  --   --   --   --   --   --   --   --  150  --  108*  --  116*   NA  --   --   --   --   --   --   --   --  140  --  139  --  139   POTASSIUM   --   --   --  3.7  --   --  4.4  --  3.4   < > 3.6   < > 3.8   CHLORIDE  --   --   --   --   --   --   --   --  103  --  103  --  102   CO2  --   --   --   --   --   --   --   --  30  --  31  --  30   BUN  --   --   --   --   --   --   --   --  15  --  16  --  15   CR  --   --   --   --   --   --   --   --  0.60*  --  0.57*  --  0.64*   ANIONGAP  --   --   --   --   --   --   --   --  7  --  5  --  7   ERIK  --   --   --   --   --   --   --   --  8.7  --  8.9  --  9.2   * 100* 88  --    < >   < >  --    < > 85   < > 95   < > 87    < > = values in this interval not displayed.     No results found for this or any previous visit (from the past 24 hour(s)).  Medications     - MEDICATION INSTRUCTIONS -       - MEDICATION INSTRUCTIONS -         amLODIPine  5 mg Oral Daily     azithromycin  250 mg Oral Daily     escitalopram  5 mg Oral QPM     fluticasone  1 puff Inhalation Daily     fluticasone  1 spray Both Nostrils Daily     heparin ANTICOAGULANT  5,000 Units Subcutaneous Q12H     heparin lock flush  5-20 mL Intracatheter Q24H     insulin aspart   Subcutaneous TID AC     insulin aspart  1-7 Units Subcutaneous TID AC     insulin aspart  1-5 Units Subcutaneous At Bedtime     insulin glargine  8 Units Subcutaneous QAM AC     levothyroxine  25 mcg Oral QAM AC     melatonin  5 mg Oral At Bedtime     mycophenolate  1,000 mg Oral BID IS     pantoprazole  40 mg Oral BID AC     predniSONE  20 mg Oral Daily     sodium chloride (PF)  10-40 mL Intracatheter Q8H     sulfamethoxazole-trimethoprim  1 tablet Oral Once per day on Mon Wed Fri     vitamin D2  50,000 Units Oral Q7 Days

## 2021-09-28 NOTE — PROGRESS NOTES
[facility]    Medicine Progress Note - Hospitalist Service, Gold 10       Date of Admission:  9/5/2021    Assessment & Plan    56 year old male with history of NSIP associated with rheumatoid arthritis and traction bronchiectasis who was admitted on 9/5/2021 with acute on chronic hypoxic respiratory failure likely from ILD. Weaning steroids now in preparation for transplant listing.      Today:   -Continue to encourage ambulation, adequate nutrition, calorie counts   -Prednisone dose down to 20mg today, plan to list for lung transplant.      Acute hypoxic respiratory failure  secondary to progressive ILD  Although the patient had tachypnea and leukocytosis on 9/16/2021, sepsis has been effectively ruled out.  2 view chest x-ray showed improvement in the patient's known interstitial lung disease.  -Appreciate pulm consult  -Continue BiPAP at night, HFNC during day  -Continue azithromycin as an anti-inflammatory  -Continue prednisone taper based on recommendations of pulmonology service  -Continue MMF 1000 mg twice daily based on recommendations of pulmonology service      Lung transplant candidacy  -Perimolar abscess excision completed on 9/22/2021 in OR by dentistry. Discussed with dental team 9/24, no indication for antibiotics post tooth extraction   -Although the patient had indeterminate quanteferon gold, this was deemed to be low risk for active or latent tuberculosis based on evaluation of transplant infectious disease.  -The patient received ivermectin at 15 mg X1 dose for treatment of possible stronglydies based on recommendations of transplant ID  -Following pending urinary coccidoses antigen  -The patient will need monthly coccidoses antigen for 12 months after transplant  - When transplanted please send, in addition to the lungs, a mediastinal LN for pathology, fungal cx and AFB smear and cx.   -The patient will likely need to follow up with ID as an  outpatient within 2 months of lung transplant to follow up on the urinary Cocci Ag and the LN and lungs biopsies.   -Azathioprine to be avoided after transplant given low TPMT based on recommendations of transplant pulmonology, however if used, then azathioprine to be used in a low-dose    Elevated Lactate - intermittent   Intermittently found to have elevated lactate after triggering sepsis. Usually resolved with minimal/no interventions (I.e. small fluid bolus). Unclear etiology.     Moderate (Non-severe) malnutrition in the context of chronic illness, all are POA  -Nutrition consulted  -Calorie counts, trend weights      Thrombocytopenia   Leukocytosis - improving   May be due to stress response, illness, medications (steroids)  -Trend CBC     Vitamin D deficiency, POA  -Continue vitamin D replacement     Rheumatoid arthritis  Patient seen by rheumatology this admission. No acute flare of rheumatoid arthritis. SINCERE antibodies negative, flow cytometry negative for CD19 cells, CK normal, aldolase normal. Anti-Irene-1 negative. The patient was treated with leflunomide from 5/21 to 7/21. Rheumatology considers that leflunomide toxicity might contribute to the patient's ILD. -Continue cholestyramine TID to increase leflunomide clearance.      Steroid induced diabetes  -Continue insulin glargine 8 units  -Continue insulin NovoLog at medium insulin resistance     Chronic medical problems  Hypothyroidism   Continue 25 mg levothyroxin qday     SALTY  Continue BiPAP at night.      Hypertension  On 5mg amlodipine, BP well controlled today.     Anxiety  Depression  Continue 5mg escitalopram       Diet: Snacks/Supplements Adult: Other; pt may order snacks/supplements PRN; Between Meals  Snacks/Supplements Adult: Ensure Enlive; With Meals  Regular Diet Adult  Calorie Counts    DVT Prophylaxis: Heparin SQ  Watson Catheter: Not present  Central Lines: None  Code Status: Full Code      Disposition Plan   Expected discharge: 10/01/2021    recommended to prior living arrangement once lung transplant.     The patient's care was discussed with the Bedside Nurse and Patient.    Zoya Forrest DO  Hospitalist Service, [address]  Securely message with the Vocera Web Console (learn more here)  Text page via [facility] Paging/Directory  Please see sign in/sign out for up to date coverage information    Clinically Significant Risk Factors Present on Admission                ______________________________________________________________________    Interval History   No overnight events reported.   He continues to feel well, no dyspnea, chest pain, fevers, cough.  Calorie counts from 9/26 - 100% of all three meals, 3000 calories documented.     Data reviewed today: I reviewed all medications, new labs and imaging results over the last 24 hours. I personally reviewed no images or EKG's today.    Physical Exam   Vital Signs: Temp: 99.1  F (37.3  C) Temp src: Oral BP: (!) 146/88 Pulse: 108   Resp: 18 SpO2: 95 % O2 Device: High Flow Nasal Cannula (HFNC) Oxygen Delivery: 30 LPM  Weight: 150 lbs 12.71 oz  Constitutional: no apparent distress, pleasant and cooperative   Cardiovascular: regular rate and rhythm, normal S1 and S2, no murmurs, rubs, or gallops noted, no bilateral lower extremity edema   Respiratory: minimal/improving crackles throughout, no wheezing or rhonchi, normal work of breathing   GI: abdomen soft, non tender, non distended, bowel sounds present   Neuro: alert and oriented, no gross focal deficits noted     Data   Recent Labs   Lab 09/27/21  1741 09/27/21  1541 09/27/21  1329 09/27/21  1303 09/27/21  0821 09/27/21  0639 09/24/21  0736 09/24/21  0639 09/23/21  0734 09/23/21  0600   WBC  --   --   --   --   --  11.3*  --  12.4*  --  13.3*   HGB  --   --   --   --   --  10.2*  --  9.9*  --  10.6*   MCV  --   --   --   --   --  90  --  92  --  90   PLT  --   --   --   --   --  150  --  108*  --  116*    NA  --   --   --   --   --  140  --  139  --  139   POTASSIUM  --   --  4.4  --   --  3.4  --  3.6   < > 3.8   CHLORIDE  --   --   --   --   --  103  --  103  --  102   CO2  --   --   --   --   --  30  --  31  --  30   BUN  --   --   --   --   --  15  --  16  --  15   CR  --   --   --   --   --  0.60*  --  0.57*  --  0.64*   ANIONGAP  --   --   --   --   --  7  --  5  --  7   ERIK  --   --   --   --   --  8.7  --  8.9  --  9.2   * 245*  --  165*   < > 85   < > 95   < > 87    < > = values in this interval not displayed.     No results found for this or any previous visit (from the past 24 hour(s)).  Medications     - MEDICATION INSTRUCTIONS -       - MEDICATION INSTRUCTIONS -         amLODIPine  5 mg Oral Daily     azithromycin  250 mg Oral Daily     escitalopram  5 mg Oral QPM     fluticasone  1 puff Inhalation Daily     fluticasone  1 spray Both Nostrils Daily     heparin ANTICOAGULANT  5,000 Units Subcutaneous Q12H     heparin lock flush  5-20 mL Intracatheter Q24H     insulin aspart   Subcutaneous TID AC     insulin aspart  1-7 Units Subcutaneous TID AC     insulin aspart  1-5 Units Subcutaneous At Bedtime     insulin glargine  8 Units Subcutaneous QAM AC     levothyroxine  25 mcg Oral QAM AC     melatonin  5 mg Oral At Bedtime     mycophenolate  1,000 mg Oral BID IS     pantoprazole  40 mg Oral BID AC     predniSONE  20 mg Oral Daily     sodium chloride (PF)  10-40 mL Intracatheter Q8H     sulfamethoxazole-trimethoprim  1 tablet Oral Once per day on Mon Wed Fri     vitamin D2  50,000 Units Oral Q7 Days

## 2021-09-28 NOTE — PROGRESS NOTES
[facility]   Pulmonary   Progress Note  Edson Thornton MRN: [id number]  1965  Date of Admission:9/5/2021  Date of Service: 09/28/2021        Assessment & Plan    56-year-old male (BMI 27.6) with history of NSIP/ILD, RA (status post Rituxan infusion, leflunomide), transferred from [facility] for acute on chronic hypoxemic respiratory failure refractory to treatment. Plan is to list patient for transplant now that he is on his goal prednisone dose.    1. Acute on chronic hypoxemic respiratory failure  2. NSIP-ILD  3. Rheumatoid arthritis (positive anti-CCP, RF)     Discussion:   Patient continues to have stable O2 requirements with slow tapering of his steroids after initiating mycophenolate treatment. Plan is to complete taper by the end of the week with patient remaining on prednisone 20 mg daily by 9/27.    Recommendations    -Continue with prednisone 20 mg daily   -Can repeat CRP if O2 requirements increase     We will continue to follow.     Patient seen & discussed w/  Dr. Daniels.     Faisal Caban MD   Pulmonary/Critical Care Fellow   Pager #[phone]     Interval History      RN notes reviewed, no acute events overnight.  Patient feels unchanged compared to prior. He reports having a good appetite, is ambulating often, and makes his needs known.      No fever, chills, CP, night sweats, sore throat, numbness, tingling.    Five-point ROS is negative except for what is noted in the interval history.    Physical Exam Temp:  [97.8  F (36.6  C)-99.1  F (37.3  C)] 98.4  F (36.9  C)  Pulse:  [] 73  Resp:  [17-27] 17  BP: (105-146)/(64-88) 128/82  FiO2 (%):  [40 %] 40 %  SpO2:  [90 %-99 %] 99 %  I/O last 3 completed shifts:  In: 1340 [P.O.:1320; I.V.:20]  Out: 750 [Urine:750]  Wt Readings from Last 1 Encounters:   09/28/21 68.9 kg (151 lb 14.4 oz)    Body mass index is 26.07 kg/m . FiO2 (%): 40 %  Resp: 17      Exam:  General:  Sitting upright on HFNC, cooperative, NAD.  HEENT: Anicteric sclera.  EOMI.  Lungs: Bibasilar crackles; speaking in full sentences on HFNC 40% O2 at 30 LPM.   Abd: Soft, NT, ND  Ext: WWP,  No LE edema  Skin: Plethoric appearing face, otherwise no cyanosis, or jaundice  Neuro: AAOx3, no focal deficits    Data   Labs: reviewed in EMR and notable labs listed below.  CBC RESULTS: Recent Labs   Lab Test 09/15/21  0454   WBC 23.3*   RBC 3.42*   HGB 9.8*   HCT 30.2*   MCV 88   MCH 28.7   MCHC 32.5   RDW 17.2*   *           Imaging: reviewed in EMR and notable imaging listed below.    Chest X-Ray 2 views 9/23/21  IMPRESSION: Continued decrease in interstitial and airspace opacities,  compared to chest x-ray 9/19/2021. Enlarged cardiomediastinal  silhouette.    Chest CT high resolution 9/7/21:  IMPRESSION:   1. Extensive bilateral groundglass and reticular opacities are  slightly increased compared to chest CT from 8/30/2021. This likely  represents progression of known NSIP, however superimposed infection  or cannot be excluded.  2. Small bilateral pleural effusions.  3. Stable mediastinal lymphadenopathy, likely reactive.     Medications     amLODIPine  5 mg Oral Daily     azithromycin  250 mg Oral Daily     escitalopram  5 mg Oral QPM     fluticasone  1 puff Inhalation Daily     fluticasone  1 spray Both Nostrils Daily     heparin ANTICOAGULANT  5,000 Units Subcutaneous Q12H     heparin lock flush  5-20 mL Intracatheter Q24H     insulin aspart   Subcutaneous TID AC     insulin aspart  1-7 Units Subcutaneous TID AC     insulin aspart  1-5 Units Subcutaneous At Bedtime     insulin glargine  8 Units Subcutaneous QAM AC     levothyroxine  25 mcg Oral QAM AC     melatonin  5 mg Oral At Bedtime     mycophenolate  1,000 mg Oral BID IS     pantoprazole  40 mg Oral BID AC     predniSONE  20 mg Oral Daily     sodium chloride (PF)  10-40 mL Intracatheter Q8H     sulfamethoxazole-trimethoprim  1 tablet Oral Once per day on Mon Wed Fri     vitamin D2  50,000 Units Oral Q7 Days

## 2021-09-28 NOTE — PROGRESS NOTES
Calorie Count  Intake recorded for: 9/27  Total Kcals: 3347 Total Protein: 117g  Kcals from Hospital Food: 3347  Protein: 117g  Kcals from Outside Food (average):0 Protein: 0g  # Meals Ordered from Kitchen: 3 meals   # Meals Recorded: 100% 3 meals   # Supplements Recorded: 100% 2 Ensure Enlives

## 2021-09-28 NOTE — PLAN OF CARE
"Neuro: A&Ox4. Able to make needs known.   Cardiac: SR and sinus tach. /82 (BP Location: Right arm)   Pulse 107   Temp 98.5  F (36.9  C) (Oral)   Resp 20   Ht 1.626 m (5' 4\")   Wt 68.9 kg (151 lb 14.4 oz)   SpO2 95%   BMI 26.07 kg/m       Respiratory: Sating upper 90s on 30L HFNC at 40% FiO2. Lung sounds clear and diminished. No c/o SOB  GI/: Voids spontaneously with urinal. BM X1  Diet/appetite: Tolerating reg diet with carb coverage. Eating well.  Activity:  Standby assist. Up in chair today.   Pain: Pt denies pain this shift.  Skin: No new deficits noted. Redness blanchable on nose.   LDA's: Triple lumen PICC on R, all lumens heparin locked.     Plan: Continue with POC. Notify primary team with changes.   "

## 2021-09-29 ENCOUNTER — DOCUMENTATION ONLY (OUTPATIENT)
Dept: TRANSPLANT | Facility: CLINIC | Age: 56
End: 2021-09-29

## 2021-09-29 ENCOUNTER — APPOINTMENT (OUTPATIENT)
Dept: PHYSICAL THERAPY | Facility: CLINIC | Age: 56
End: 2021-09-29
Attending: STUDENT IN AN ORGANIZED HEALTH CARE EDUCATION/TRAINING PROGRAM
Payer: COMMERCIAL

## 2021-09-29 LAB
ANION GAP SERPL CALCULATED.3IONS-SCNC: 4 MMOL/L (ref 3–14)
BACTERIA BLD CULT: NO GROWTH
BACTERIA BLD CULT: NO GROWTH
BUN SERPL-MCNC: 14 MG/DL (ref 7–30)
CALCIUM SERPL-MCNC: 9 MG/DL (ref 8.5–10.1)
CHLORIDE BLD-SCNC: 104 MMOL/L (ref 94–109)
CO2 SERPL-SCNC: 30 MMOL/L (ref 20–32)
CREAT SERPL-MCNC: 0.61 MG/DL (ref 0.66–1.25)
CRP SERPL-MCNC: 51 MG/L (ref 0–8)
ERYTHROCYTE [DISTWIDTH] IN BLOOD BY AUTOMATED COUNT: 17.9 % (ref 10–15)
GFR SERPL CREATININE-BSD FRML MDRD: >90 ML/MIN/1.73M2
GLUCOSE BLD-MCNC: 86 MG/DL (ref 70–99)
GLUCOSE BLDC GLUCOMTR-MCNC: 115 MG/DL (ref 70–99)
GLUCOSE BLDC GLUCOMTR-MCNC: 123 MG/DL (ref 70–99)
GLUCOSE BLDC GLUCOMTR-MCNC: 168 MG/DL (ref 70–99)
GLUCOSE BLDC GLUCOMTR-MCNC: 89 MG/DL (ref 70–99)
HCT VFR BLD AUTO: 31.4 % (ref 40–53)
HGB BLD-MCNC: 10.2 G/DL (ref 13.3–17.7)
MCH RBC QN AUTO: 29.2 PG (ref 26.5–33)
MCHC RBC AUTO-ENTMCNC: 32.5 G/DL (ref 31.5–36.5)
MCV RBC AUTO: 90 FL (ref 78–100)
PLATELET # BLD AUTO: 187 10E3/UL (ref 150–450)
POTASSIUM BLD-SCNC: 3.7 MMOL/L (ref 3.4–5.3)
RBC # BLD AUTO: 3.49 10E6/UL (ref 4.4–5.9)
SODIUM SERPL-SCNC: 138 MMOL/L (ref 133–144)
WBC # BLD AUTO: 11.1 10E3/UL (ref 4–11)

## 2021-09-29 PROCEDURE — 94618 PULMONARY STRESS TESTING: CPT

## 2021-09-29 PROCEDURE — 250N000012 HC RX MED GY IP 250 OP 636 PS 637: Performed by: DENTIST

## 2021-09-29 PROCEDURE — 99233 SBSQ HOSP IP/OBS HIGH 50: CPT | Performed by: STUDENT IN AN ORGANIZED HEALTH CARE EDUCATION/TRAINING PROGRAM

## 2021-09-29 PROCEDURE — 85027 COMPLETE CBC AUTOMATED: CPT | Performed by: STUDENT IN AN ORGANIZED HEALTH CARE EDUCATION/TRAINING PROGRAM

## 2021-09-29 PROCEDURE — 120N000003 HC R&B IMCU UMMC

## 2021-09-29 PROCEDURE — 80048 BASIC METABOLIC PNL TOTAL CA: CPT | Performed by: STUDENT IN AN ORGANIZED HEALTH CARE EDUCATION/TRAINING PROGRAM

## 2021-09-29 PROCEDURE — 250N000013 HC RX MED GY IP 250 OP 250 PS 637: Performed by: DENTIST

## 2021-09-29 PROCEDURE — 250N000011 HC RX IP 250 OP 636: Performed by: PHYSICIAN ASSISTANT

## 2021-09-29 PROCEDURE — 87205 SMEAR GRAM STAIN: CPT | Performed by: DERMATOLOGY

## 2021-09-29 PROCEDURE — 250N000011 HC RX IP 250 OP 636: Performed by: INTERNAL MEDICINE

## 2021-09-29 PROCEDURE — 99221 1ST HOSP IP/OBS SF/LOW 40: CPT | Mod: GC | Performed by: DERMATOLOGY

## 2021-09-29 PROCEDURE — 36592 COLLECT BLOOD FROM PICC: CPT | Performed by: STUDENT IN AN ORGANIZED HEALTH CARE EDUCATION/TRAINING PROGRAM

## 2021-09-29 PROCEDURE — 97530 THERAPEUTIC ACTIVITIES: CPT | Mod: GP

## 2021-09-29 PROCEDURE — 87070 CULTURE OTHR SPECIMN AEROBIC: CPT | Performed by: DERMATOLOGY

## 2021-09-29 PROCEDURE — 99233 SBSQ HOSP IP/OBS HIGH 50: CPT | Mod: GC | Performed by: INTERNAL MEDICINE

## 2021-09-29 PROCEDURE — 87798 DETECT AGENT NOS DNA AMP: CPT | Performed by: DERMATOLOGY

## 2021-09-29 PROCEDURE — 87529 HSV DNA AMP PROBE: CPT | Performed by: DERMATOLOGY

## 2021-09-29 PROCEDURE — 86140 C-REACTIVE PROTEIN: CPT | Performed by: STUDENT IN AN ORGANIZED HEALTH CARE EDUCATION/TRAINING PROGRAM

## 2021-09-29 PROCEDURE — 999N000157 HC STATISTIC RCP TIME EA 10 MIN

## 2021-09-29 RX ADMIN — PANTOPRAZOLE SODIUM 40 MG: 40 TABLET, DELAYED RELEASE ORAL at 16:37

## 2021-09-29 RX ADMIN — LEVOTHYROXINE SODIUM 25 MCG: 0.03 TABLET ORAL at 07:53

## 2021-09-29 RX ADMIN — ESCITALOPRAM OXALATE 5 MG: 5 TABLET, FILM COATED ORAL at 21:42

## 2021-09-29 RX ADMIN — INSULIN ASPART 5 UNITS: 100 INJECTION, SOLUTION INTRAVENOUS; SUBCUTANEOUS at 09:07

## 2021-09-29 RX ADMIN — FLUTICASONE FUROATE 1 PUFF: 100 POWDER RESPIRATORY (INHALATION) at 07:53

## 2021-09-29 RX ADMIN — HEPARIN SODIUM 5000 UNITS: 5000 INJECTION, SOLUTION INTRAVENOUS; SUBCUTANEOUS at 07:53

## 2021-09-29 RX ADMIN — FLUTICASONE PROPIONATE 1 SPRAY: 50 SPRAY, METERED NASAL at 07:53

## 2021-09-29 RX ADMIN — INSULIN ASPART 10 UNITS: 100 INJECTION, SOLUTION INTRAVENOUS; SUBCUTANEOUS at 18:14

## 2021-09-29 RX ADMIN — SULFAMETHOXAZOLE AND TRIMETHOPRIM 1 TABLET: 800; 160 TABLET ORAL at 07:53

## 2021-09-29 RX ADMIN — SODIUM CHLORIDE, PRESERVATIVE FREE 15 ML: 5 INJECTION INTRAVENOUS at 14:19

## 2021-09-29 RX ADMIN — PANTOPRAZOLE SODIUM 40 MG: 40 TABLET, DELAYED RELEASE ORAL at 07:53

## 2021-09-29 RX ADMIN — AMLODIPINE BESYLATE 5 MG: 5 TABLET ORAL at 07:53

## 2021-09-29 RX ADMIN — Medication 5 MG: at 21:43

## 2021-09-29 RX ADMIN — ERGOCALCIFEROL 50000 UNITS: 1.25 CAPSULE, LIQUID FILLED ORAL at 14:19

## 2021-09-29 RX ADMIN — INSULIN ASPART 9 UNITS: 100 INJECTION, SOLUTION INTRAVENOUS; SUBCUTANEOUS at 13:09

## 2021-09-29 RX ADMIN — MYCOPHENOLATE MOFETIL 1000 MG: 500 TABLET, FILM COATED ORAL at 18:13

## 2021-09-29 RX ADMIN — SODIUM CHLORIDE, PRESERVATIVE FREE 5 ML: 5 INJECTION INTRAVENOUS at 05:42

## 2021-09-29 RX ADMIN — PREDNISONE 20 MG: 20 TABLET ORAL at 07:53

## 2021-09-29 RX ADMIN — HEPARIN SODIUM 5000 UNITS: 5000 INJECTION, SOLUTION INTRAVENOUS; SUBCUTANEOUS at 21:44

## 2021-09-29 RX ADMIN — AZITHROMYCIN MONOHYDRATE 250 MG: 250 TABLET ORAL at 07:53

## 2021-09-29 RX ADMIN — MYCOPHENOLATE MOFETIL 1000 MG: 500 TABLET, FILM COATED ORAL at 07:53

## 2021-09-29 ASSESSMENT — ACTIVITIES OF DAILY LIVING (ADL)
ADLS_ACUITY_SCORE: 10
ADLS_ACUITY_SCORE: 12

## 2021-09-29 ASSESSMENT — MIFFLIN-ST. JEOR: SCORE: 1432.28

## 2021-09-29 NOTE — PROGRESS NOTES
Lung Transplant Consult Follow Up Note   September 28, 2021            Assessment and Plan:   Edson Thornton is a 56-year-old male with NSIP/ILD-RA with hypoxic respiratory failure previously treated with leflunomide and Rituxan, more recently with high-dose steroids and broad-spectrum antibiotics.  He  has completed a lung transplantation evaluation and appears to be an appropriate candidate for lung transplantation.     #. Acute on chronic resp failure: due to NSIP/ILD-RA with exacerbation. w/u neg for infection. Treated with Steroid burst and is on a taper and now at 20mg/day (9/27/21).  - Remains on MMF.  - SALTY on BiPAP at night.  - Bactrim prophylaxis.    #. RA: Currently not any DMARD.    #. ID:  Per ID consultants, no infectious contraindications for transplant but recommended the following:  -Single dose of ivermectin 200 mcg/kg x1 for possible strongyloides exposure (done)  -Urinary Coccidioides Ag (Neg on 9/20/21), will need to do monthly for 12 months after the transplant.  -When transplanted please send, in addition to the lungs, a mediastinal LN for pathology, fungal cx and AFB smear and cx.   - Will need to follow up with ID as an outpatient within 2 months of lung transplant to follow up on the urinary Cocci Ag and the LN and lungs biopsies.   - Quantiferon Gold was indeterminate on 9/7/21, low risk for TB per ID.  - Unclear if pt needs to remain on Azithromycin.    #. Hypogammaglobulinemia:  IgG was low at 363 on 9/7/1.  - Will give IVIG one week after lung transplantation.    #. Vit D deficiency: On Replacement. Will repeat on 10/30/21 to 11/15/21.    #. Steroid induced DM:   - BS well controlled on Lantus.    #. Hypothyroidism: O levothyroxine.     #. Nutrition: He has lost ~ 10# over the last month. Will continue to monitor. Appetite is better.    #. HTN: on Amlodipine.    #. Anxiety/Depression: On Lexapro.    #. Anemia/thrombocytopenia: PBS was non-diagnostic. It is stable, no evidence of  hemolysis.    #. Lung transplant consideration:  - Completed evaluation. Plan to activate on list on 9/27/21. He continues to work with PT/OT regularly.    Issues addressed:  -First left mandibular molar extraction for asymptomatic dental abscess noted on CT extracted on 9/22/21.  -TPMT noted to be low on transplant evaluation.  We will try to avoid azathioprine but if required will initiate at low dose.             Interval History:   Breathing is comfortable at rest with supplemental oxygen. Dyspnea with mild exertion, not significantly changed. Occasional cough. No sputum. No chest pain. No hemoptysis.  Denies dental pain.           Review of Systems:   C: NEGATIVE for fever, chills  INTEGUMENTARY/SKIN: no rash or obvious new lesions  ENT/MOUTH: no sore throat, new sinus pain or nasal drainage  RESP: see interval history  CV: NEGATIVE for chest pain, palpitations or peripheral edema  GI: no nausea, vomiting, change in stools  : no dysuria  MUSCULOSKELETAL: no myalgias, arthralgias  PSYCHIATRIC: mood stable          Medications:       amLODIPine  5 mg Oral Daily     azithromycin  250 mg Oral Daily     escitalopram  5 mg Oral QPM     fluticasone  1 puff Inhalation Daily     fluticasone  1 spray Both Nostrils Daily     heparin ANTICOAGULANT  5,000 Units Subcutaneous Q12H     heparin lock flush  5-20 mL Intracatheter Q24H     insulin aspart   Subcutaneous TID AC     insulin aspart  1-7 Units Subcutaneous TID AC     insulin aspart  1-5 Units Subcutaneous At Bedtime     insulin glargine  8 Units Subcutaneous QAM AC     levothyroxine  25 mcg Oral QAM AC     melatonin  5 mg Oral At Bedtime     mycophenolate  1,000 mg Oral BID IS     pantoprazole  40 mg Oral BID AC     predniSONE  20 mg Oral Daily     sodium chloride (PF)  10-40 mL Intracatheter Q8H     sulfamethoxazole-trimethoprim  1 tablet Oral Once per day on Mon Wed Fri     vitamin D2  50,000 Units Oral Q7 Days     acetaminophen, benzocaine 20%, artificial tears  ophthalmic solution, glucose **OR** dextrose **OR** glucagon, heparin lock flush, [DISCONTINUED] ipratropium - albuterol 0.5 mg/2.5 mg/3 mL **OR** ipratropium - albuterol 0.5 mg/2.5 mg/3 mL, lidocaine 4%, lidocaine (buffered or not buffered), - MEDICATION INSTRUCTIONS -, ondansetron **OR** ondansetron, oxyCODONE **OR** oxyCODONE, - MEDICATION INSTRUCTIONS -, polyethylene glycol, sodium chloride (PF)         Physical Exam:   Temp:  [97.6  F (36.4  C)-98.5  F (36.9  C)] 97.6  F (36.4  C)  Pulse:  [] 96  Resp:  [17-27] 22  BP: (105-139)/(64-82) 139/82  FiO2 (%):  [40 %] 40 %  SpO2:  [94 %-99 %] 95 %    Intake/Output Summary (Last 24 hours) at 9/28/2021 2057  Last data filed at 9/28/2021 1924  Gross per 24 hour   Intake 260 ml   Output 925 ml   Net -665 ml       Constitutional:   Awake, alert and in no apparent distress     Eyes:   nonicteric     ENT:    oral mucosa moist without lesions     Lungs:   Diminished air flow.  Scattered basilar fine insp crackles. No rhonchi.       Cardiovascular:   Normal S1 and S2.  RRR.  No murmur. No gallop. No rub.     Abdomen:   NABS, soft, nondistended, nontender.  No HSM.     Musculoskeletal:   No edema     Neurologic:   Alert and conversant.     Skin:   Warm, dry.  No rash on limited exam.             Data:   All laboratory and imaging data reviewed.    Results for orders placed or performed during the hospital encounter of 09/05/21 (from the past 24 hour(s))   Glucose by meter   Result Value Ref Range    GLUCOSE BY METER POCT 120 (H) 70 - 99 mg/dL   Lactic Acid STAT   Result Value Ref Range    Lactic Acid 1.1 0.7 - 2.0 mmol/L   Glucose by meter   Result Value Ref Range    GLUCOSE BY METER POCT 103 (H) 70 - 99 mg/dL   Potassium   Result Value Ref Range    Potassium 3.7 3.4 - 5.3 mmol/L   Extra Tube    Narrative    The following orders were created for panel order Extra Tube.  Procedure                               Abnormality         Status                     ---------                                -----------         ------                     Extra Purple Top Tube[[id number]]                            Final result                 Please view results for these tests on the individual orders.   Extra Purple Top Tube   Result Value Ref Range    Hold Specimen [facility]    Glucose by meter   Result Value Ref Range    GLUCOSE BY METER POCT 88 70 - 99 mg/dL   Glucose by meter   Result Value Ref Range    GLUCOSE BY METER POCT 100 (H) 70 - 99 mg/dL   Glucose by meter   Result Value Ref Range    GLUCOSE BY METER POCT 137 (H) 70 - 99 mg/dL

## 2021-09-29 NOTE — PLAN OF CARE
Neuro: A&Ox4. Wears glasses.   Cardiac: SR/ST. VSS.   Respiratory: Sating >90% on 40% BiPAP overnight. Bridge of nose continues to have blanchable redness, but patient feels this is improving with new BiPAP mask.   GI/: Adequate urine output via urinal. Last BM 9/28. Abdomen soft/non-tender.   Diet/appetite: Tolerating regular diet. Eating well.   Activity:  Standby assist to commode or chair in room. Makes major position changes in bed independently.   Pain:  Denies   Skin: No new deficits noted. Wound cares completed on scrotal redness. Patient states he feels this is improving and denies any itching/discomfort.   LDA's: Right PICC heparin locked.     Plan: Listed for transplant 9/27. Continue with POC. Notify primary team with changes.

## 2021-09-29 NOTE — PLAN OF CARE
"Blood pressure (!) 159/101, pulse (!) 127, temperature 98.4  F (36.9  C), temperature source Oral, resp. rate 18, height 1.626 m (5' 4\"), weight 69.1 kg (152 lb 6.4 oz), SpO2 93 %.  Pt on 40% HFNC 30L, up to 60% with activity.  Up in chair most of the day.  HR tachy while up.  Denies any pain.  Good appetite on regular diet.  BG AC+HS, carb coverage given.  Voiding per urinal.  No BM this shift.  Parents at bedside.  Call light within reach.  Continue with plan of care.   "

## 2021-09-29 NOTE — PROGRESS NOTES
[facility]   Pulmonary   Progress Note  Edson Thornton MRN: [id number]  1965  Date of Admission:9/5/2021  Date of Service: 09/29/2021        Assessment & Plan    56-year-old male (BMI 27.6) with history of NSIP/ILD, RA (status post Rituxan infusion, leflunomide), transferred from [facility] for acute on chronic hypoxemic respiratory failure refractory to treatment. Plan is to list patient for transplant now that he is on his goal prednisone dose.    1. Acute on chronic hypoxemic respiratory failure  2. NSIP-ILD  3. Rheumatoid arthritis (positive anti-CCP, RF)     Discussion:   Patient's O2 requirements remain unchanged despite his rising CRP and drop to prednisone 20 mg daily started on 9/27. Will continue to monitor his respiratory status and trend inflammatory markers at this time.    Recommendations    -Continue with prednisone 20 mg daily   -Continue to trend CRP q24-48h     We will continue to follow.     Patient seen & discussed w/  Dr. Daniels.     Faisal Caban MD   Pulmonary/Critical Care Fellow   Pager #[phone]     Interval History      RN notes reviewed, no acute events overnight.  Patient feels unchanged compared to yestesrday. His appetite remains good and he continues to work with PT.     No fever, chills, CP, night sweats, sore throat, numbness, tingling.    Five-point ROS is negative except for what is noted in the interval history.    Physical Exam Temp:  [96.9  F (36.1  C)-98.7  F (37.1  C)] 98.7  F (37.1  C)  Pulse:  [] 115  Resp:  [16-22] 18  BP: (118-139)/(66-82) 131/78  FiO2 (%):  [40 %] 40 %  SpO2:  [94 %-98 %] 94 %  I/O last 3 completed shifts:  In: 740 [P.O.:720; I.V.:20]  Out: 1450 [Urine:1450]  Wt Readings from Last 1 Encounters:   09/29/21 69.1 kg (152 lb 6.4 oz)    Body mass index is 26.16 kg/m . FiO2 (%): 40 %  Resp: 18      Exam:  General:  Sitting upright on HFNC, cooperative, NAD.  HEENT: Anicteric sclera. EOMI.  Lungs: Bibasilar crackles; speaking in full  sentences on HFNC 40% O2 at 30 LPM.   Abd: Soft, NT, ND  Ext: WWP,  No LE edema  Skin: Plethoric appearing face, otherwise no cyanosis, or jaundice  Neuro: AAOx3, no focal deficits    Data   Labs: reviewed in EMR and notable labs listed below.  CBC RESULTS: Recent Labs   Lab Test 09/15/21  0454   WBC 23.3*   RBC 3.42*   HGB 9.8*   HCT 30.2*   MCV 88   MCH 28.7   MCHC 32.5   RDW 17.2*   *           Imaging: reviewed in EMR and notable imaging listed below.    Chest X-Ray 2 views 9/23/21  IMPRESSION: Continued decrease in interstitial and airspace opacities,  compared to chest x-ray 9/19/2021. Enlarged cardiomediastinal  silhouette.    Chest CT high resolution 9/7/21:  IMPRESSION:   1. Extensive bilateral groundglass and reticular opacities are  slightly increased compared to chest CT from 8/30/2021. This likely  represents progression of known NSIP, however superimposed infection  or cannot be excluded.  2. Small bilateral pleural effusions.  3. Stable mediastinal lymphadenopathy, likely reactive.     Medications     amLODIPine  5 mg Oral Daily     azithromycin  250 mg Oral Daily     escitalopram  5 mg Oral QPM     fluticasone  1 puff Inhalation Daily     fluticasone  1 spray Both Nostrils Daily     heparin ANTICOAGULANT  5,000 Units Subcutaneous Q12H     heparin lock flush  5-20 mL Intracatheter Q24H     insulin aspart   Subcutaneous TID AC     insulin aspart  1-7 Units Subcutaneous TID AC     insulin aspart  1-5 Units Subcutaneous At Bedtime     insulin glargine  8 Units Subcutaneous QAM AC     levothyroxine  25 mcg Oral QAM AC     melatonin  5 mg Oral At Bedtime     mycophenolate  1,000 mg Oral BID IS     pantoprazole  40 mg Oral BID AC     predniSONE  20 mg Oral Daily     sodium chloride (PF)  10-40 mL Intracatheter Q8H     sulfamethoxazole-trimethoprim  1 tablet Oral Once per day on Mon Wed Fri     vitamin D2  50,000 Units Oral Q7 Days

## 2021-09-29 NOTE — PROGRESS NOTES
[facility]    Medicine Progress Note - Hospitalist Service, Gold 10       Date of Admission:  9/5/2021    Assessment & Plan        56 year old male with history of NSIP associated with rheumatoid arthritis and traction bronchiectasis who was admitted on 9/5/2021 with acute on chronic hypoxic respiratory failure likely from ILD. Weaning steroids now in preparation for transplant listing.      Today:  -Consult dermatology given lower jaw lesions  -Placed picture in chart  -Prednisone/MMF per pulmonary  -6-minute walk  -Continue CRP trend    Acute hypoxic respiratory failure  secondary to progressive ILD  Although the patient had tachypnea and leukocytosis on 9/16/2021, sepsis has been effectively ruled out.  2 view chest x-ray showed improvement in the patient's known interstitial lung disease.  -Appreciate pulm consult  -Continue BiPAP at night, HFNC during day  -Continue azithromycin as an anti-inflammatory  -Continue prednisone taper based on recommendations of pulmonology service (currently on 20 mg daily)  -Continue MMF 1000 mg twice daily based on recommendations of pulmonology service      Lung transplant candidacy  -Perimolar abscess excision completed on 9/22/2021 in OR by dentistry. Discussed with dental team 9/24, no indication for antibiotics post tooth extraction   -Although the patient had indeterminate quanteferon gold, this was deemed to be low risk for active or latent tuberculosis based on evaluation of transplant infectious disease.  -The patient received ivermectin at 15 mg X1 dose for treatment of possible stronglydies based on recommendations of transplant ID  -Negative urinary coccidoses antigen   -The patient will need monthly coccidoses antigen for 12 months after transplant  - When transplanted please send, in addition to the lungs, a mediastinal LN for pathology, fungal cx and AFB smear and cx.   -The patient will likely need to follow up with ID  as an outpatient within 2 months of lung transplant to follow up on the urinary Cocci Ag and the LN and lungs biopsies.   -Azathioprine to be avoided after transplant given low TPMT based on recommendations of transplant pulmonology, however if used, then azathioprine to be used in a low-dose     Elevated Lactate - intermittent, improving  Intermittently found to have elevated lactate after triggering sepsis. Usually resolved with minimal/no interventions (I.e. small fluid bolus). Unclear etiology.      Moderate (Non-severe) malnutrition in the context of chronic illness, all are POA  -Nutrition consulted  -Calorie counts, trend weights      Thrombocytopenia   Leukocytosis - improving   May be due to stress response, illness, medications (steroids)  -Trend CBC      Vitamin D deficiency, POA  -Continue vitamin D replacement    Lower jaw lesions  Patient endorses having lesions for multiple years and will frequently pick at them.  Physical exam with chronic crusted over lesions throughout lower jaw.  Given chronicity and transplant candidate will consult dermatology.  -Consult dermatology, appreciate recs  -Image placed in chart     Rheumatoid arthritis  Patient seen by rheumatology this admission. No acute flare of rheumatoid arthritis. SINCERE antibodies negative, flow cytometry negative for CD19 cells, CK normal, aldolase normal. Anti-Irene-1 negative. The patient was treated with leflunomide from 5/21 to 7/21. Rheumatology considers that leflunomide toxicity might contribute to the patient's ILD.   -Continue cholestyramine TID to increase leflunomide clearance.      Steroid induced diabetes  -Continue insulin glargine 8 units  -Continue insulin NovoLog at medium insulin resistance     Chronic medical problems  Hypothyroidism   Continue 25 mg levothyroxin qday     SALTY  Continue BiPAP at night.      Hypertension  On 5mg amlodipine, BP well controlled today.     Anxiety  Depression  Continue 5mg escitalopram       Diet:  Snacks/Supplements Adult: Other; pt may order snacks/supplements PRN; Between Meals  Snacks/Supplements Adult: Ensure Enlive; With Meals  Regular Diet Adult    DVT Prophylaxis: Heparin SQ  Watson Catheter: Not present  Central Lines: None  Code Status: Full Code      Disposition Plan   Expected discharge: 10/05/2021   recommended to prior living arrangement once possible lung transplant and consults.     The patient's care was discussed with the Bedside Nurse, Patient, Patient's Family and Pulmonary transplant team.    Rodrigo Mckinnon MD  Hospitalist Service, [address]  Securely message with the Vocera Web Console (learn more here)  Text page via [facility] Paging/Directory  Please see sign in/sign out for up to date coverage information    Clinically Significant Risk Factors Present on Admission                ______________________________________________________________________    Interval History   Doing okay overall.  No overnight events.  Mild cough continues (possibly slightly decreased).  No chest pain or shortness of breath.  No fever or chills.  No blood per any orifice.  He does note that he has had lesions on his lower jaw for years and that he frequently will pick at them.    Data reviewed today: I reviewed all medications, new labs and imaging results over the last 24 hours. I personally reviewed no images or EKG's today.    Physical Exam   Vital Signs: Temp: 97.2  F (36.2  C) Temp src: Axillary BP: 118/66 Pulse: 77   Resp: 20 SpO2: 94 % O2 Device: BiPAP/CPAP Oxygen Delivery: 30 LPM  Weight: 152 lbs 6.4 oz  Constitutional: no apparent distress, pleasant and cooperative, sitting back in bed  Cardiovascular: RRR, normal S1 and S2, no murmurs, rubs, or gallops noted, no bilateral LE edema   Respiratory: Slight crackles in the posterior lung bases (R>L), no wheezing or rhonchi, normal work of breathing   GI: abdomen soft, non tender, non distended, bowel  sounds present   Skin: Lower jaw with multiple crusted over lesions chronic changes.  Neuro: alert and oriented, no gross focal deficits noted     Data   Recent Labs   Lab 09/29/21  0751 09/29/21  0548 09/28/21  2247 09/28/21  0811 09/28/21  0528 09/27/21  1541 09/27/21  1329 09/27/21  0821 09/27/21  0639 09/24/21  0736 09/24/21  0639   WBC  --  11.1*  --   --   --   --   --   --  11.3*  --  12.4*   HGB  --  10.2*  --   --   --   --   --   --  10.2*  --  9.9*   MCV  --  90  --   --   --   --   --   --  90  --  92   PLT  --  187  --   --   --   --   --   --  150  --  108*   NA  --  138  --   --   --   --   --   --  140  --  139   POTASSIUM  --  3.7  --   --  3.7  --  4.4   < > 3.4   < > 3.6   CHLORIDE  --  104  --   --   --   --   --   --  103  --  103   CO2  --  30  --   --   --   --   --   --  30  --  31   BUN  --  14  --   --   --   --   --   --  15  --  16   CR  --  0.61*  --   --   --   --   --   --  0.60*  --  0.57*   ANIONGAP  --  4  --   --   --   --   --   --  7  --  5   ERIK  --  9.0  --   --   --   --   --   --  8.7  --  8.9   GLC 89 86 156*   < >  --    < >  --    < > 85   < > 95    < > = values in this interval not displayed.     No results found for this or any previous visit (from the past 24 hour(s)).  Medications     - MEDICATION INSTRUCTIONS -       - MEDICATION INSTRUCTIONS -         amLODIPine  5 mg Oral Daily     azithromycin  250 mg Oral Daily     escitalopram  5 mg Oral QPM     fluticasone  1 puff Inhalation Daily     fluticasone  1 spray Both Nostrils Daily     heparin ANTICOAGULANT  5,000 Units Subcutaneous Q12H     heparin lock flush  5-20 mL Intracatheter Q24H     insulin aspart   Subcutaneous TID AC     insulin aspart  1-7 Units Subcutaneous TID AC     insulin aspart  1-5 Units Subcutaneous At Bedtime     insulin glargine  8 Units Subcutaneous QAM AC     levothyroxine  25 mcg Oral QAM AC     melatonin  5 mg Oral At Bedtime     mycophenolate  1,000 mg Oral BID IS     pantoprazole  40 mg Oral  BID AC     predniSONE  20 mg Oral Daily     sodium chloride (PF)  10-40 mL Intracatheter Q8H     sulfamethoxazole-trimethoprim  1 tablet Oral Once per day on Mon Wed Fri     vitamin D2  50,000 Units Oral Q7 Days

## 2021-09-29 NOTE — PLAN OF CARE
"SIX MINUTE WALK TEST  Physical Therapy  2021    Edson Thornton MRN# [id number]   YOB: 1965 Age: 56 year old     Height: 5' 4\"  Weight (lbs): 152 lbs 6.4 oz Weight (kg): 69.1 kg (actual weight)    Supplemental oxygen during the test: Yes, 60% FiO2 35 L via HFNC.    Oxygen Appliance: HFNC.    Oximetry: Finger Probe.    Gait Aid: None.     Pre-test Post-test   Blood Pressure (mm Hg) 137/81 131/77   Heart rate (bpm) 105 bpm 114 bpm   Rated Perceived Dyspnea (Shen Scale) 1 -- Very mild shortness of breath  7 -- Severe shortness of breath   OMNI 0 -- (Nothing at all) 8 -- Severe   SpO2 (%) 98% on 40% FiO2 35 L via HFNC 89% on 60% FiO2 35 L via HFNC     Total distance walked in 6 minutes: 384 feet.    Paused during test? No.    Stopped test before 6 minutes? No.    Did the patient experience any pain or discomfort during the test? No.  Pain Ratin/10    Oxygen Titration Required: Yes.  Resting oxygen requirement: 40% FiO2 35 L via HFNC.  Exercise oxygen requirement: 60% FiO2 35 L via HFNC.    Performing Staff: Lori Philip PT  "

## 2021-09-29 NOTE — CONSULTS
"[facility] Inpatient Dermatology Consult Note    Assessment:    #. Alopecic patches with erosions and crust - left chin  Exam with alopecic patches and superficial erosions with crust. Limited concern for cutaneous malignancy. Possibly prurigo nodules vs impetigo. With immunosuppression could have lingering herpetic infection (VZV w/ unilateral nature vs HSV). Will swab for viral PCR. Will collect culture and gram stain as well to eval impetigo.   - Swab taken for HSV / VZV PCR  - Swab for bacterial aerobic culture and gram stain  - Pending viral swabs, will re-eval for consideration of biopsy  - Further recommendations pending the above    Thank you for the dermatology consultation. Please do not hesitate to contact the dermatology resident/faculty on call for any additional questions or concerns. We will continue to follow.     Patient seen and evaluated with attending physician, Dr. Estrada.    Joel Landry MD  Internal Medicine - Dermatology PGY 4    I have seen and examined this patient and agree with the assessment and plan as documented in the resident's note.    Ronnie Estrada MD  Dermatology Attending      Encounter Date: Sep 2, 2021    Reason for Consultation: erosions     History of Present Illness:  Mr. Edson Thornton is a 56 year old male NSIP/ILD, RA (status post Rituxan infusion, leflunomide), transferred from [facility] for acute on chronic hypoxemic respiratory failure refractory to treatment. Plan is to list patient for transplant now that he is on his goal prednisone dose. Dermatology was consulted for erosions on chin seen during transplant eval. Several months. Started as pimples. Will pick at the spots. Was under better control when he was \"working on it with his primary.\" Not painful at onset; now mildly uncomfortable. Had the Shingrix vaccine apparently. No other concerns voiced.    Past Medical History:   Past Medical History:   Diagnosis Date     Anxiety      Depression      " HLD (hyperlipidemia)      HTN (hypertension)      Hypothyroidism      ILD (interstitial lung disease) (H)      SALTY on CPAP      Oxygen dependent     BL 4L since ~6/2021     Rheumatoid arthritis (H)     signs ~5/2020, dx 5/2021     Steroid-induced hyperglycemia      Traction bronchiectasis (H)      Past Surgical History:   Procedure Laterality Date     COLONOSCOPY W/ BIOPSIES AND POLYPECTOMY  07/21/2020     CV CORONARY ANGIOGRAM N/A 9/8/2021    Procedure: Coronary Angiogram with possible intervention;  Surgeon: Jovon Bullock MD;  Location:  HEART CARDIAC CATH LAB     CV RIGHT HEART CATH MEASUREMENTS RECORDED N/A 9/8/2021    Procedure: Right Heart Cath;  Surgeon: Jovon Bullock MD;  Location:  HEART CARDIAC CATH LAB     EXTRACTION(S) DENTAL N/A 9/22/2021    Procedure: EXTRACTION tooth #19;  Surgeon: eDepak Tobin DDS;  Location:  OR     HERNIA REPAIR       right acl       XR ACROMIOCLAVICULAR JOINT BILATERAL         Social History:   reports that he has never smoked. He has never used smokeless tobacco. He reports that he does not drink alcohol and does not use drugs.    Family History:  Family History   Problem Relation Age of Onset     Diabetes Type 1 Mother      Heart Disease Mother      Chronic Obstructive Pulmonary Disease Mother      Rheumatoid Arthritis Father      Emphysema Paternal Grandfather        Medications:  Current Facility-Administered Medications   Medication     acetaminophen (TYLENOL) tablet 325-650 mg     amLODIPine (NORVASC) tablet 5 mg     azithromycin (ZITHROMAX) tablet 250 mg     benzocaine 20% (HURRICAINE/TOPEX) 20 % spray 0.5-1 mL     carboxymethylcellulose PF (REFRESH PLUS) 0.5 % ophthalmic solution 1 drop     glucose gel 15-30 g    Or     dextrose 50 % injection 25-50 mL    Or     glucagon injection 1 mg     escitalopram (LEXAPRO) tablet 5 mg     fluticasone (ARNUITY ELLIPTA) 100 MCG/ACT inhaler 1 puff     fluticasone (FLONASE) 50 MCG/ACT spray 1 spray      "heparin ANTICOAGULANT injection 5,000 Units     heparin lock flush 10 UNIT/ML injection 5-20 mL     heparin lock flush 10 UNIT/ML injection 5-20 mL     insulin aspart (NovoLOG) injection (RAPID ACTING)     insulin aspart (NovoLOG) injection (RAPID ACTING)     insulin aspart (NovoLOG) injection (RAPID ACTING)     insulin glargine (LANTUS PEN) injection 8 Units     ipratropium - albuterol 0.5 mg/2.5 mg/3 mL (DUONEB) neb solution 3 mL     levothyroxine (SYNTHROID/LEVOTHROID) tablet 25 mcg     lidocaine (LMX4) cream     lidocaine 1 % 0.1-1 mL     melatonin tablet 5 mg     mycophenolate (GENERIC EQUIVALENT) tablet 1,000 mg     No lozenges or gum should be given while patient on BIPAP/AVAPS/AVAPS AE     ondansetron (ZOFRAN-ODT) ODT tab 4 mg    Or     ondansetron (ZOFRAN) injection 4 mg     oxyCODONE (ROXICODONE) tablet 5 mg    Or     oxyCODONE IR (ROXICODONE) tablet 10 mg     pantoprazole (PROTONIX) EC tablet 40 mg     Patient may continue current oral medications     polyethylene glycol (MIRALAX) Packet 17 g     predniSONE (DELTASONE) tablet 20 mg     sodium chloride (PF) 0.9% PF flush 10-20 mL     sodium chloride (PF) 0.9% PF flush 10-40 mL     sulfamethoxazole-trimethoprim (BACTRIM DS) 800-160 MG per tablet 1 tablet     vitamin D2 (ERGOCALCIFEROL) 22280 units (1250 mcg) capsule 50,000 Units        Allergies:  No Known Allergies      Review of Systems:  As per HPI      Physical exam:  BP (!) 159/101 (BP Location: Left arm)   Pulse 116   Temp 98.4  F (36.9  C) (Oral)   Resp 18   Ht 1.626 m (5' 4\")   Wt 69.1 kg (152 lb 6.4 oz)   SpO2 93%   BMI 26.16 kg/m    GEN: chronically ill appearing male  SKIN: Null type II. Focused examination of the face was performed.  -Eroded papules in alopecic patches with overlying hemorrhagic and honey crusting - left of midline on chin and submandibular skin  -Pink-red macules and finely raised papules coalescing into plaques favoring the trunk    Laboratory:  Results for " orders placed or performed during the hospital encounter of 09/05/21 (from the past 24 hour(s))   Glucose by meter   Result Value Ref Range    GLUCOSE BY METER POCT 137 (H) 70 - 99 mg/dL   Glucose by meter   Result Value Ref Range    GLUCOSE BY METER POCT 156 (H) 70 - 99 mg/dL   Basic metabolic panel   Result Value Ref Range    Sodium 138 133 - 144 mmol/L    Potassium 3.7 3.4 - 5.3 mmol/L    Chloride 104 94 - 109 mmol/L    Carbon Dioxide (CO2) 30 20 - 32 mmol/L    Anion Gap 4 3 - 14 mmol/L    Urea Nitrogen 14 7 - 30 mg/dL    Creatinine 0.61 (L) 0.66 - 1.25 mg/dL    Calcium 9.0 8.5 - 10.1 mg/dL    Glucose 86 70 - 99 mg/dL    GFR Estimate >90 >60 mL/min/1.73m2   CBC with platelets   Result Value Ref Range    WBC Count 11.1 (H) 4.0 - 11.0 10e3/uL    RBC Count 3.49 (L) 4.40 - 5.90 10e6/uL    Hemoglobin 10.2 (L) 13.3 - 17.7 g/dL    Hematocrit 31.4 (L) 40.0 - 53.0 %    MCV 90 78 - 100 fL    MCH 29.2 26.5 - 33.0 pg    MCHC 32.5 31.5 - 36.5 g/dL    RDW 17.9 (H) 10.0 - 15.0 %    Platelet Count 187 150 - 450 10e3/uL   CRP inflammation   Result Value Ref Range    CRP Inflammation 51.0 (H) 0.0 - 8.0 mg/L   Glucose by meter   Result Value Ref Range    GLUCOSE BY METER POCT 89 70 - 99 mg/dL   Glucose by meter   Result Value Ref Range    GLUCOSE BY METER POCT 168 (H) 70 - 99 mg/dL   Glucose by meter   Result Value Ref Range    GLUCOSE BY METER POCT 123 (H) 70 - 99 mg/dL       Staff Involved:  Resident(Joel Landry)/Staff(as above)

## 2021-09-30 ENCOUNTER — APPOINTMENT (OUTPATIENT)
Dept: CT IMAGING | Facility: CLINIC | Age: 56
End: 2021-09-30
Attending: STUDENT IN AN ORGANIZED HEALTH CARE EDUCATION/TRAINING PROGRAM
Payer: COMMERCIAL

## 2021-09-30 LAB
ALBUMIN SERPL-MCNC: 2.7 G/DL (ref 3.4–5)
ALP SERPL-CCNC: 78 U/L (ref 40–150)
ALT SERPL W P-5'-P-CCNC: 34 U/L (ref 0–70)
ANION GAP SERPL CALCULATED.3IONS-SCNC: 9 MMOL/L (ref 3–14)
AST SERPL W P-5'-P-CCNC: 12 U/L (ref 0–45)
BILIRUB SERPL-MCNC: 0.3 MG/DL (ref 0.2–1.3)
BUN SERPL-MCNC: 14 MG/DL (ref 7–30)
CALCIUM SERPL-MCNC: 8.9 MG/DL (ref 8.5–10.1)
CHLORIDE BLD-SCNC: 101 MMOL/L (ref 94–109)
CO2 SERPL-SCNC: 26 MMOL/L (ref 20–32)
CREAT SERPL-MCNC: 0.64 MG/DL (ref 0.66–1.25)
ERYTHROCYTE [DISTWIDTH] IN BLOOD BY AUTOMATED COUNT: 18 % (ref 10–15)
GFR SERPL CREATININE-BSD FRML MDRD: >90 ML/MIN/1.73M2
GLUCOSE BLD-MCNC: 193 MG/DL (ref 70–99)
GLUCOSE BLDC GLUCOMTR-MCNC: 113 MG/DL (ref 70–99)
GLUCOSE BLDC GLUCOMTR-MCNC: 121 MG/DL (ref 70–99)
GLUCOSE BLDC GLUCOMTR-MCNC: 86 MG/DL (ref 70–99)
GLUCOSE BLDC GLUCOMTR-MCNC: 91 MG/DL (ref 70–99)
GRAM STAIN RESULT: ABNORMAL
GRAM STAIN RESULT: ABNORMAL
HCT VFR BLD AUTO: 34.1 % (ref 40–53)
HGB BLD-MCNC: 10.9 G/DL (ref 13.3–17.7)
HSV1 DNA SPEC QL NAA+PROBE: NOT DETECTED
HSV2 DNA SPEC QL NAA+PROBE: DETECTED
MCH RBC QN AUTO: 29.7 PG (ref 26.5–33)
MCHC RBC AUTO-ENTMCNC: 32 G/DL (ref 31.5–36.5)
MCV RBC AUTO: 93 FL (ref 78–100)
PLATELET # BLD AUTO: 209 10E3/UL (ref 150–450)
POTASSIUM BLD-SCNC: 3.6 MMOL/L (ref 3.4–5.3)
PROT SERPL-MCNC: 6.2 G/DL (ref 6.8–8.8)
RBC # BLD AUTO: 3.67 10E6/UL (ref 4.4–5.9)
SODIUM SERPL-SCNC: 136 MMOL/L (ref 133–144)
WBC # BLD AUTO: 15 10E3/UL (ref 4–11)

## 2021-09-30 PROCEDURE — 36592 COLLECT BLOOD FROM PICC: CPT | Performed by: STUDENT IN AN ORGANIZED HEALTH CARE EDUCATION/TRAINING PROGRAM

## 2021-09-30 PROCEDURE — 250N000013 HC RX MED GY IP 250 OP 250 PS 637: Performed by: DENTIST

## 2021-09-30 PROCEDURE — 250N000011 HC RX IP 250 OP 636: Performed by: PHYSICIAN ASSISTANT

## 2021-09-30 PROCEDURE — 94660 CPAP INITIATION&MGMT: CPT

## 2021-09-30 PROCEDURE — 80053 COMPREHEN METABOLIC PANEL: CPT | Performed by: STUDENT IN AN ORGANIZED HEALTH CARE EDUCATION/TRAINING PROGRAM

## 2021-09-30 PROCEDURE — 71250 CT THORAX DX C-: CPT

## 2021-09-30 PROCEDURE — 250N000012 HC RX MED GY IP 250 OP 636 PS 637: Performed by: DENTIST

## 2021-09-30 PROCEDURE — 999N000157 HC STATISTIC RCP TIME EA 10 MIN

## 2021-09-30 PROCEDURE — 71250 CT THORAX DX C-: CPT | Mod: 26 | Performed by: RADIOLOGY

## 2021-09-30 PROCEDURE — 99233 SBSQ HOSP IP/OBS HIGH 50: CPT | Performed by: STUDENT IN AN ORGANIZED HEALTH CARE EDUCATION/TRAINING PROGRAM

## 2021-09-30 PROCEDURE — 250N000012 HC RX MED GY IP 250 OP 636 PS 637: Performed by: STUDENT IN AN ORGANIZED HEALTH CARE EDUCATION/TRAINING PROGRAM

## 2021-09-30 PROCEDURE — 250N000013 HC RX MED GY IP 250 OP 250 PS 637: Performed by: STUDENT IN AN ORGANIZED HEALTH CARE EDUCATION/TRAINING PROGRAM

## 2021-09-30 PROCEDURE — 999N000215 HC STATISTIC HFNC ADULT NON-CPAP

## 2021-09-30 PROCEDURE — 250N000011 HC RX IP 250 OP 636: Performed by: INTERNAL MEDICINE

## 2021-09-30 PROCEDURE — 120N000003 HC R&B IMCU UMMC

## 2021-09-30 PROCEDURE — 85027 COMPLETE CBC AUTOMATED: CPT | Performed by: STUDENT IN AN ORGANIZED HEALTH CARE EDUCATION/TRAINING PROGRAM

## 2021-09-30 PROCEDURE — 99233 SBSQ HOSP IP/OBS HIGH 50: CPT | Mod: GC | Performed by: INTERNAL MEDICINE

## 2021-09-30 PROCEDURE — 999N000043 HC STATISTIC CTO2 CONT OXYGEN TECH TIME EA 90 MIN

## 2021-09-30 RX ORDER — ACYCLOVIR 200 MG/1
400 CAPSULE ORAL 3 TIMES DAILY
Status: COMPLETED | OUTPATIENT
Start: 2021-09-30 | End: 2021-10-10

## 2021-09-30 RX ORDER — MYCOPHENOLATE MOFETIL 500 MG/1
1500 TABLET ORAL
Status: DISCONTINUED | OUTPATIENT
Start: 2021-09-30 | End: 2021-10-15

## 2021-09-30 RX ADMIN — SODIUM CHLORIDE, PRESERVATIVE FREE 5 ML: 5 INJECTION INTRAVENOUS at 14:07

## 2021-09-30 RX ADMIN — HEPARIN SODIUM 5000 UNITS: 5000 INJECTION, SOLUTION INTRAVENOUS; SUBCUTANEOUS at 09:05

## 2021-09-30 RX ADMIN — PANTOPRAZOLE SODIUM 40 MG: 40 TABLET, DELAYED RELEASE ORAL at 18:29

## 2021-09-30 RX ADMIN — FLUTICASONE PROPIONATE 1 SPRAY: 50 SPRAY, METERED NASAL at 09:05

## 2021-09-30 RX ADMIN — Medication 5 MG: at 20:17

## 2021-09-30 RX ADMIN — ACYCLOVIR 400 MG: 200 CAPSULE ORAL at 14:07

## 2021-09-30 RX ADMIN — HEPARIN SODIUM 5000 UNITS: 5000 INJECTION, SOLUTION INTRAVENOUS; SUBCUTANEOUS at 20:18

## 2021-09-30 RX ADMIN — ACYCLOVIR 400 MG: 200 CAPSULE ORAL at 20:17

## 2021-09-30 RX ADMIN — SODIUM CHLORIDE, PRESERVATIVE FREE 15 ML: 5 INJECTION INTRAVENOUS at 23:30

## 2021-09-30 RX ADMIN — PANTOPRAZOLE SODIUM 40 MG: 40 TABLET, DELAYED RELEASE ORAL at 09:05

## 2021-09-30 RX ADMIN — SODIUM CHLORIDE, PRESERVATIVE FREE 15 ML: 5 INJECTION INTRAVENOUS at 18:31

## 2021-09-30 RX ADMIN — PREDNISONE 20 MG: 20 TABLET ORAL at 09:05

## 2021-09-30 RX ADMIN — FLUTICASONE FUROATE 1 PUFF: 100 POWDER RESPIRATORY (INHALATION) at 09:05

## 2021-09-30 RX ADMIN — ESCITALOPRAM OXALATE 5 MG: 5 TABLET, FILM COATED ORAL at 20:17

## 2021-09-30 RX ADMIN — INSULIN ASPART 8 UNITS: 100 INJECTION, SOLUTION INTRAVENOUS; SUBCUTANEOUS at 14:10

## 2021-09-30 RX ADMIN — LEVOTHYROXINE SODIUM 25 MCG: 0.03 TABLET ORAL at 09:05

## 2021-09-30 RX ADMIN — INSULIN ASPART 5 UNITS: 100 INJECTION, SOLUTION INTRAVENOUS; SUBCUTANEOUS at 10:10

## 2021-09-30 RX ADMIN — SODIUM CHLORIDE, PRESERVATIVE FREE 5 ML: 5 INJECTION INTRAVENOUS at 10:55

## 2021-09-30 RX ADMIN — AMLODIPINE BESYLATE 5 MG: 5 TABLET ORAL at 09:05

## 2021-09-30 RX ADMIN — AZITHROMYCIN MONOHYDRATE 250 MG: 250 TABLET ORAL at 09:05

## 2021-09-30 RX ADMIN — MYCOPHENOLATE MOFETIL 1500 MG: 500 TABLET, FILM COATED ORAL at 18:29

## 2021-09-30 RX ADMIN — MYCOPHENOLATE MOFETIL 1000 MG: 500 TABLET, FILM COATED ORAL at 09:05

## 2021-09-30 ASSESSMENT — ACTIVITIES OF DAILY LIVING (ADL)
ADLS_ACUITY_SCORE: 10
ADLS_ACUITY_SCORE: 12
ADLS_ACUITY_SCORE: 10
ADLS_ACUITY_SCORE: 12
ADLS_ACUITY_SCORE: 10
ADLS_ACUITY_SCORE: 12

## 2021-09-30 NOTE — PROGRESS NOTES
[facility]    Medicine Progress Note - Hospitalist Service, Gold 10       Date of Admission:  9/5/2021    Assessment & Plan      56 year old male with history of NSIP associated with rheumatoid arthritis and traction bronchiectasis who was admitted on 9/5/2021 with acute on chronic hypoxic respiratory failure likely from ILD. Weaning steroids now in preparation for transplant listing.      Today:  -Starting acyclovir given the HSV-2+ jaw lesions  -Increase MMF, per pulmonary team  -Continue current prednisone dosing  -Follow up CT chest w/o contrast  -Slightly increased white count, will repeat in a.m.  -Continue CRP trend (q48hr)    Acute hypoxic respiratory failure  secondary to progressive ILD  Although the patient had tachypnea and leukocytosis on 9/16/2021, sepsis has been effectively ruled out.  2 view chest x-ray showed improvement in the patient's known interstitial lung disease.  -Appreciate pulm consult  -Continue BiPAP at night, HFNC during day  -Continue azithromycin as an anti-inflammatory  -Continue prednisone taper based on recommendations of pulmonology service (currently on 20 mg daily)  -Increase MMF 1500 mg twice daily, per pulmonary      Lung transplant candidacy  -Perimolar abscess excision completed on 9/22/2021 in OR by dentistry. Discussed with dental team 9/24, no indication for antibiotics post tooth extraction   -Although the patient had indeterminate quanteferon gold, this was deemed to be low risk for active or latent tuberculosis based on evaluation of transplant infectious disease.  -The patient received ivermectin at 15 mg X1 dose for treatment of possible stronglydies based on recommendations of transplant ID  -Negative urinary coccidoses antigen   -The patient will need monthly coccidoses antigen for 12 months after transplant  - When transplanted please send, in addition to the lungs, a mediastinal LN for pathology, fungal cx and AFB  smear and cx.   -The patient will likely need to follow up with ID as an outpatient within 2 months of lung transplant to follow up on the urinary Cocci Ag and the LN and lungs biopsies.   -Azathioprine to be avoided after transplant given low TPMT based on recommendations of transplant pulmonology, however if used, then azathioprine to be used in a low-dose    HSV positive left-sided jaw lesions  Patient endorses having lesions for multiple years and will frequently pick at them.  Physical exam with chronic crusted over lesions throughout lower jaw.  Consulted to dermatology and jaw lesions HSV 2+ with initiation of acyclovir.  Preliminary plan to continue 3 times daily acyclovir for 10 days.  May need longer depending on immunosuppression/resolution of lesions.  -Consult dermatology, appreciate recs  -Start acyclovir 400 mg 3 times daily  -No current need for precautions (not disseminated)     Elevated Lactate - intermittent, improving  Intermittently found to have elevated lactate after triggering sepsis. Usually resolved with minimal/no interventions (I.e. small fluid bolus). Unclear etiology.      Moderate (Non-severe) malnutrition in the context of chronic illness, all are POA  -Nutrition consulted  -Calorie counts, trend weights      Thrombocytopenia   Leukocytosis - improving   May be due to stress response, illness, medications (steroids)  -Trend CBC      Vitamin D deficiency, POA  -Continue vitamin D replacement     Rheumatoid arthritis  Patient seen by rheumatology this admission. No acute flare of rheumatoid arthritis. SINCERE antibodies negative, flow cytometry negative for CD19 cells, CK normal, aldolase normal. Anti-Irene-1 negative. The patient was treated with leflunomide from 5/21 to 7/21. Rheumatology considers that leflunomide toxicity might contribute to the patient's ILD.   -Continue cholestyramine TID to increase leflunomide clearance.      Steroid induced diabetes  -Continue insulin glargine 8  units  -Continue insulin NovoLog at medium insulin resistance     Chronic medical problems  Hypothyroidism   Continue 25 mg levothyroxin qday     SALTY  Continue BiPAP at night.      Hypertension  On 5mg amlodipine, BP well controlled today.     Anxiety  Depression  Continue 5mg escitalopram       Diet: Snacks/Supplements Adult: Other; pt may order snacks/supplements PRN; Between Meals  Snacks/Supplements Adult: Ensure Enlive; With Meals  Regular Diet Adult    DVT Prophylaxis: Heparin SQ  Watson Catheter: Not present  Central Lines: None  Code Status: Full Code      Disposition Plan   Expected discharge: 10/06/2021   recommended to prior living arrangement once further lung transplant workup.     The patient's care was discussed with the Patient and Pulmonary transplant, dermatology, and pulmonary team.    Rodrigo Mckinnon MD  Hospitalist Service, [address]  Securely message with the Vocera Web Console (learn more here)  Text page via [employer] Paging/Directory  Please see sign in/sign out for up to date coverage information    Clinically Significant Risk Factors Present on Admission              ______________________________________________________________________    Interval History   Doing okay overall.  No acute events overnight.  Discussed findings of his jaw lesions.  No chest pain or abdominal pain.  No change in shortness of breath.  No fever chills overnight.    Data reviewed today: I reviewed all medications, new labs and imaging results over the last 24 hours. I personally reviewed no images or EKG's today.    Physical Exam   Vital Signs: Temp: 97.9  F (36.6  C) Temp src: Axillary BP: 107/73 Pulse: 69   Resp: 26 SpO2: 100 % O2 Device: BiPAP/CPAP Oxygen Delivery: 30 LPM  Weight: 152 lbs 6.4 oz  Constitutional: no apparent distress, pleasant and cooperative, sitting back in bed  Cardiovascular: RRR, normal S1 and S2, no murmurs, rubs, or gallops noted, no  bilateral LE edema   Respiratory: Slight crackles in the posterior lung bases (R>L), no wheezing or rhonchi, normal work of breathing   GI: abdomen soft, non tender, non distended, bowel sounds present   Skin: Lower jaw with multiple crusted over lesions chronic changes (stable).  Neuro: alert and oriented, no gross focal deficits noted     Data   Recent Labs   Lab 09/30/21  0739 09/29/21  1811 09/29/21  1308 09/29/21  0751 09/29/21  0548 09/28/21  0811 09/28/21  0528 09/27/21  1541 09/27/21  1329 09/27/21  0821 09/27/21  0639 09/24/21  0736 09/24/21  0639   WBC  --   --   --   --  11.1*  --   --   --   --   --  11.3*  --  12.4*   HGB  --   --   --   --  10.2*  --   --   --   --   --  10.2*  --  9.9*   MCV  --   --   --   --  90  --   --   --   --   --  90  --  92   PLT  --   --   --   --  187  --   --   --   --   --  150  --  108*   NA  --   --   --   --  138  --   --   --   --   --  140  --  139   POTASSIUM  --   --   --   --  3.7  --  3.7  --  4.4   < > 3.4   < > 3.6   CHLORIDE  --   --   --   --  104  --   --   --   --   --  103  --  103   CO2  --   --   --   --  30  --   --   --   --   --  30  --  31   BUN  --   --   --   --  14  --   --   --   --   --  15  --  16   CR  --   --   --   --  0.61*  --   --   --   --   --  0.60*  --  0.57*   ANIONGAP  --   --   --   --  4  --   --   --   --   --  7  --  5   ERIK  --   --   --   --  9.0  --   --   --   --   --  8.7  --  8.9   GLC 86 115* 123*   < > 86   < >  --    < >  --    < > 85   < > 95    < > = values in this interval not displayed.     No results found for this or any previous visit (from the past 24 hour(s)).  Medications     - MEDICATION INSTRUCTIONS -       - MEDICATION INSTRUCTIONS -         amLODIPine  5 mg Oral Daily     azithromycin  250 mg Oral Daily     escitalopram  5 mg Oral QPM     fluticasone  1 puff Inhalation Daily     fluticasone  1 spray Both Nostrils Daily     heparin ANTICOAGULANT  5,000 Units Subcutaneous Q12H     heparin lock flush  5-20 mL  Intracatheter Q24H     insulin aspart   Subcutaneous TID AC     insulin aspart  1-7 Units Subcutaneous TID AC     insulin aspart  1-5 Units Subcutaneous At Bedtime     insulin glargine  8 Units Subcutaneous QAM AC     levothyroxine  25 mcg Oral QAM AC     melatonin  5 mg Oral At Bedtime     mycophenolate  1,500 mg Oral BID IS     pantoprazole  40 mg Oral BID AC     predniSONE  20 mg Oral Daily     sodium chloride (PF)  10-40 mL Intracatheter Q8H     sulfamethoxazole-trimethoprim  1 tablet Oral Once per day on Mon Wed Fri     vitamin D2  50,000 Units Oral Q7 Days

## 2021-09-30 NOTE — PROGRESS NOTES
[facility]   Pulmonary   Progress Note  Edson Thornton MRN: [id number]  1965  Date of Admission:9/5/2021  Date of Service: 09/30/2021        Assessment & Plan    56-year-old male (BMI 27.6) with history of NSIP/ILD, RA (status post Rituxan infusion, leflunomide), transferred from [facility] for acute on chronic hypoxemic respiratory failure refractory to treatment. Plan is to list patient for transplant now that he is on his goal prednisone dose.    1. Acute on chronic hypoxemic respiratory failure, stable  2. NSIP-ILD  3. Rheumatoid arthritis (positive anti-CCP, RF)     Discussion:   Patient's O2 requirements remain unchanged despite his rising CRP and drop to prednisone 20 mg daily started on 9/27. It is likely due to the quick tapering of his steroid regimen in addition to the fact that MMF can take up to 4 weeks for its antiinflammatory effect to occur. Will increase the dosing of MMF and repeat CT chest to determine if there are new areas of inflammation warranting a discussion about increasing patient's prednisone dosing.    Recommendations    -Increase MMF to 1500 mg BID (Pulmonary to adjust dosing)  -Repeat CT chest w/o contrast (ordered for you)  -Will discuss with lung txp team regarding increasing prednisone dose pending CT results  -Continue to trend CRP q48     We will continue to follow.     Patient seen & discussed w/  Dr. Daniels.     Faisal Caban MD   Pulmonary/Critical Care Fellow   Pager #[phone]     Interval History      RN notes reviewed, no acute events overnight.  Patient reports a dry cough that is more persistent this week compared to prior weeks. He otherwise notes his SOB is at baseline, he's completing his PT exercises and still has a good appetite.     No fever, chills, CP, night sweats, sore throat, numbness, tingling.    Five-point ROS is negative except for what is noted in the interval history.    Physical Exam Temp:  [97.9  F (36.6  C)-98.7  F (37.1  C)] 97.9  F  (36.6  C)  Pulse:  [] 69  Resp:  [18-26] 26  BP: (107-159)/() 107/73  FiO2 (%):  [40 %] 40 %  SpO2:  [93 %-100 %] 100 %  I/O last 3 completed shifts:  In: 960 [P.O.:960]  Out: 650 [Urine:650]  Wt Readings from Last 1 Encounters:   09/29/21 69.1 kg (152 lb 6.4 oz)    Body mass index is 26.16 kg/m . FiO2 (%): 40 %  Resp: 26      Exam:  General:  Sitting upright on HFNC, cooperative, NAD.  HEENT: Anicteric sclera. EOMI.  Lungs: Bibasilar crackles; speaking in full sentences on HFNC 40% O2 at 30 LPM.   Abd: Soft, NT, ND  Ext: WWP,  No LE edema  Skin: Plethoric appearing face, otherwise no cyanosis, or jaundice  Neuro: AAOx3, no focal deficits    Data   Labs: reviewed in EMR and notable labs listed below.  CBC RESULTS: Recent Labs   Lab Test 09/15/21  0454   WBC 23.3*   RBC 3.42*   HGB 9.8*   HCT 30.2*   MCV 88   MCH 28.7   MCHC 32.5   RDW 17.2*   *       Imaging: reviewed in EMR and notable imaging listed below.    Chest X-Ray 2 views 9/23/21  IMPRESSION: Continued decrease in interstitial and airspace opacities,  compared to chest x-ray 9/19/2021. Enlarged cardiomediastinal  silhouette.    Chest CT high resolution 9/7/21:  IMPRESSION:   1. Extensive bilateral groundglass and reticular opacities are  slightly increased compared to chest CT from 8/30/2021. This likely  represents progression of known NSIP, however superimposed infection  or cannot be excluded.  2. Small bilateral pleural effusions.  3. Stable mediastinal lymphadenopathy, likely reactive.     Medications     amLODIPine  5 mg Oral Daily     azithromycin  250 mg Oral Daily     escitalopram  5 mg Oral QPM     fluticasone  1 puff Inhalation Daily     fluticasone  1 spray Both Nostrils Daily     heparin ANTICOAGULANT  5,000 Units Subcutaneous Q12H     heparin lock flush  5-20 mL Intracatheter Q24H     insulin aspart   Subcutaneous TID AC     insulin aspart  1-7 Units Subcutaneous TID AC     insulin aspart  1-5 Units Subcutaneous At Bedtime      insulin glargine  8 Units Subcutaneous QAM AC     levothyroxine  25 mcg Oral QAM AC     melatonin  5 mg Oral At Bedtime     mycophenolate  1,000 mg Oral BID IS     pantoprazole  40 mg Oral BID AC     predniSONE  20 mg Oral Daily     sodium chloride (PF)  10-40 mL Intracatheter Q8H     sulfamethoxazole-trimethoprim  1 tablet Oral Once per day on Mon Wed Fri     vitamin D2  50,000 Units Oral Q7 Days

## 2021-09-30 NOTE — PLAN OF CARE
Neuro: A&Ox4.   Cardiac: SR. VSS.   Respiratory: Sating 95 on Hi FLow 40% FiO2  GI/: Adequate urine output. BM X1  Diet/appetite: Tolerating reg diet. Eating well.  Activity:  Up ad [place] up to chair and in halls.  Pain: At acceptable level on current regimen.   Skin: No new deficits noted.  LDA's: PICC    Plan: CT today due to increase in CRP marker, otherwise still lung listed.

## 2021-09-30 NOTE — PROGRESS NOTES
Neuro: A&Ox4.   Cardiac: SR/DT 80s-100s. VSS afebrile.   Respiratory: Sating 94 on HFNC 40% 30L.  GI/: Adequate urine output.   Diet/appetite: Tolerating regular diet. Eating well.  Activity:  SBA, HOB<30.  Pain: At acceptable level on current regimen.   Skin: No new deficits noted. Generalized bruising, coccyx wound reapplied barrier cream  LDA's:  L PICC, heparin locked  Plan: Continue with POC. Notify primary team with changes.

## 2021-09-30 NOTE — PLAN OF CARE
Neuro: A&Ox4. Pleasant with cares.  Cardiac: SR/ST with HR 110s-120s. Afebrile.  Respiratory: Sating 88-94% on HFNC 30 LPM @ 40% FiO2, up to 60% FiO2 with activity.   GI/: Adequate urine output via urinal,. No BM this shift.   Diet/appetite: Tolerating regular diet with sliding scale plus carb coverage. Appetite good.  Activity: Independent, up to bedside commode.  Pain: Pt denies any pain  Skin: Scab to chin removed by provider for PCR culture  LDA's: R TL PICC: Heparin locked.      Plan: Transplant listed yesterday. Continue with POC. Notify primary team with changes

## 2021-10-01 ENCOUNTER — DOCUMENTATION ONLY (OUTPATIENT)
Dept: OTHER | Facility: CLINIC | Age: 56
End: 2021-10-01

## 2021-10-01 LAB
ANION GAP SERPL CALCULATED.3IONS-SCNC: 5 MMOL/L (ref 3–14)
BACTERIA SKIN AEROBE CULT: NORMAL
BUN SERPL-MCNC: 13 MG/DL (ref 7–30)
CALCIUM SERPL-MCNC: 9.2 MG/DL (ref 8.5–10.1)
CHLORIDE BLD-SCNC: 105 MMOL/L (ref 94–109)
CO2 SERPL-SCNC: 29 MMOL/L (ref 20–32)
CREAT SERPL-MCNC: 0.69 MG/DL (ref 0.66–1.25)
ERYTHROCYTE [DISTWIDTH] IN BLOOD BY AUTOMATED COUNT: 17.5 % (ref 10–15)
ERYTHROCYTE [DISTWIDTH] IN BLOOD BY AUTOMATED COUNT: 17.7 % (ref 10–15)
GFR SERPL CREATININE-BSD FRML MDRD: >90 ML/MIN/1.73M2
GLUCOSE BLD-MCNC: 80 MG/DL (ref 70–99)
GLUCOSE BLDC GLUCOMTR-MCNC: 112 MG/DL (ref 70–99)
GLUCOSE BLDC GLUCOMTR-MCNC: 144 MG/DL (ref 70–99)
GLUCOSE BLDC GLUCOMTR-MCNC: 176 MG/DL (ref 70–99)
GLUCOSE BLDC GLUCOMTR-MCNC: 84 MG/DL (ref 70–99)
HCT VFR BLD AUTO: 33.1 % (ref 40–53)
HCT VFR BLD AUTO: 38 % (ref 40–53)
HGB BLD-MCNC: 10.6 G/DL (ref 13.3–17.7)
HGB BLD-MCNC: 12.3 G/DL (ref 13.3–17.7)
HOLD SPECIMEN: NORMAL
LACTATE SERPL-SCNC: 2 MMOL/L (ref 0.7–2)
MCH RBC QN AUTO: 29.7 PG (ref 26.5–33)
MCH RBC QN AUTO: 29.9 PG (ref 26.5–33)
MCHC RBC AUTO-ENTMCNC: 32 G/DL (ref 31.5–36.5)
MCHC RBC AUTO-ENTMCNC: 32.4 G/DL (ref 31.5–36.5)
MCV RBC AUTO: 92 FL (ref 78–100)
MCV RBC AUTO: 93 FL (ref 78–100)
PLATELET # BLD AUTO: 225 10E3/UL (ref 150–450)
PLATELET # BLD AUTO: 304 10E3/UL (ref 150–450)
POTASSIUM BLD-SCNC: 3.5 MMOL/L (ref 3.4–5.3)
RBC # BLD AUTO: 3.57 10E6/UL (ref 4.4–5.9)
RBC # BLD AUTO: 4.12 10E6/UL (ref 4.4–5.9)
SARS-COV-2 RNA RESP QL NAA+PROBE: NEGATIVE
SODIUM SERPL-SCNC: 139 MMOL/L (ref 133–144)
VZV DNA SPEC QL NAA+PROBE: NEGATIVE
WBC # BLD AUTO: 12.9 10E3/UL (ref 4–11)
WBC # BLD AUTO: 18.7 10E3/UL (ref 4–11)

## 2021-10-01 PROCEDURE — 250N000012 HC RX MED GY IP 250 OP 636 PS 637: Performed by: DENTIST

## 2021-10-01 PROCEDURE — 36592 COLLECT BLOOD FROM PICC: CPT | Performed by: STUDENT IN AN ORGANIZED HEALTH CARE EDUCATION/TRAINING PROGRAM

## 2021-10-01 PROCEDURE — 250N000013 HC RX MED GY IP 250 OP 250 PS 637: Performed by: DENTIST

## 2021-10-01 PROCEDURE — 94660 CPAP INITIATION&MGMT: CPT

## 2021-10-01 PROCEDURE — U0003 INFECTIOUS AGENT DETECTION BY NUCLEIC ACID (DNA OR RNA); SEVERE ACUTE RESPIRATORY SYNDROME CORONAVIRUS 2 (SARS-COV-2) (CORONAVIRUS DISEASE [COVID-19]), AMPLIFIED PROBE TECHNIQUE, MAKING USE OF HIGH THROUGHPUT TECHNOLOGIES AS DESCRIBED BY CMS-2020-01-R: HCPCS | Performed by: STUDENT IN AN ORGANIZED HEALTH CARE EDUCATION/TRAINING PROGRAM

## 2021-10-01 PROCEDURE — 99233 SBSQ HOSP IP/OBS HIGH 50: CPT | Mod: GC | Performed by: INTERNAL MEDICINE

## 2021-10-01 PROCEDURE — 999N000157 HC STATISTIC RCP TIME EA 10 MIN

## 2021-10-01 PROCEDURE — 85027 COMPLETE CBC AUTOMATED: CPT | Performed by: STUDENT IN AN ORGANIZED HEALTH CARE EDUCATION/TRAINING PROGRAM

## 2021-10-01 PROCEDURE — 83605 ASSAY OF LACTIC ACID: CPT | Performed by: STUDENT IN AN ORGANIZED HEALTH CARE EDUCATION/TRAINING PROGRAM

## 2021-10-01 PROCEDURE — 250N000011 HC RX IP 250 OP 636: Performed by: PHYSICIAN ASSISTANT

## 2021-10-01 PROCEDURE — 120N000003 HC R&B IMCU UMMC

## 2021-10-01 PROCEDURE — 99233 SBSQ HOSP IP/OBS HIGH 50: CPT | Performed by: STUDENT IN AN ORGANIZED HEALTH CARE EDUCATION/TRAINING PROGRAM

## 2021-10-01 PROCEDURE — 250N000011 HC RX IP 250 OP 636: Performed by: INTERNAL MEDICINE

## 2021-10-01 PROCEDURE — 250N000012 HC RX MED GY IP 250 OP 636 PS 637: Performed by: STUDENT IN AN ORGANIZED HEALTH CARE EDUCATION/TRAINING PROGRAM

## 2021-10-01 PROCEDURE — 80048 BASIC METABOLIC PNL TOTAL CA: CPT | Performed by: STUDENT IN AN ORGANIZED HEALTH CARE EDUCATION/TRAINING PROGRAM

## 2021-10-01 PROCEDURE — 250N000013 HC RX MED GY IP 250 OP 250 PS 637: Performed by: STUDENT IN AN ORGANIZED HEALTH CARE EDUCATION/TRAINING PROGRAM

## 2021-10-01 RX ADMIN — SODIUM CHLORIDE, PRESERVATIVE FREE 5 ML: 5 INJECTION INTRAVENOUS at 05:39

## 2021-10-01 RX ADMIN — FLUTICASONE FUROATE 1 PUFF: 100 POWDER RESPIRATORY (INHALATION) at 08:32

## 2021-10-01 RX ADMIN — PREDNISONE 20 MG: 20 TABLET ORAL at 08:32

## 2021-10-01 RX ADMIN — ACYCLOVIR 400 MG: 200 CAPSULE ORAL at 13:27

## 2021-10-01 RX ADMIN — SULFAMETHOXAZOLE AND TRIMETHOPRIM 1 TABLET: 800; 160 TABLET ORAL at 08:32

## 2021-10-01 RX ADMIN — AZITHROMYCIN MONOHYDRATE 250 MG: 250 TABLET ORAL at 08:32

## 2021-10-01 RX ADMIN — ESCITALOPRAM OXALATE 5 MG: 5 TABLET, FILM COATED ORAL at 19:30

## 2021-10-01 RX ADMIN — AMLODIPINE BESYLATE 5 MG: 5 TABLET ORAL at 08:32

## 2021-10-01 RX ADMIN — PANTOPRAZOLE SODIUM 40 MG: 40 TABLET, DELAYED RELEASE ORAL at 16:19

## 2021-10-01 RX ADMIN — SODIUM CHLORIDE, PRESERVATIVE FREE 5 ML: 5 INJECTION INTRAVENOUS at 11:07

## 2021-10-01 RX ADMIN — INSULIN ASPART 3 UNITS: 100 INJECTION, SOLUTION INTRAVENOUS; SUBCUTANEOUS at 08:30

## 2021-10-01 RX ADMIN — ACYCLOVIR 400 MG: 200 CAPSULE ORAL at 19:29

## 2021-10-01 RX ADMIN — INSULIN ASPART 5 UNITS: 100 INJECTION, SOLUTION INTRAVENOUS; SUBCUTANEOUS at 13:28

## 2021-10-01 RX ADMIN — MYCOPHENOLATE MOFETIL 1500 MG: 500 TABLET, FILM COATED ORAL at 08:32

## 2021-10-01 RX ADMIN — LEVOTHYROXINE SODIUM 25 MCG: 0.03 TABLET ORAL at 08:32

## 2021-10-01 RX ADMIN — MYCOPHENOLATE MOFETIL 1500 MG: 500 TABLET, FILM COATED ORAL at 18:18

## 2021-10-01 RX ADMIN — INSULIN ASPART 10 UNITS: 100 INJECTION, SOLUTION INTRAVENOUS; SUBCUTANEOUS at 18:38

## 2021-10-01 RX ADMIN — HEPARIN SODIUM 5000 UNITS: 5000 INJECTION, SOLUTION INTRAVENOUS; SUBCUTANEOUS at 08:32

## 2021-10-01 RX ADMIN — FLUTICASONE PROPIONATE 1 SPRAY: 50 SPRAY, METERED NASAL at 08:31

## 2021-10-01 RX ADMIN — Medication 5 MG: at 19:30

## 2021-10-01 RX ADMIN — SODIUM CHLORIDE, PRESERVATIVE FREE 15 ML: 5 INJECTION INTRAVENOUS at 13:28

## 2021-10-01 RX ADMIN — HEPARIN SODIUM 5000 UNITS: 5000 INJECTION, SOLUTION INTRAVENOUS; SUBCUTANEOUS at 19:30

## 2021-10-01 RX ADMIN — PANTOPRAZOLE SODIUM 40 MG: 40 TABLET, DELAYED RELEASE ORAL at 08:32

## 2021-10-01 RX ADMIN — ACYCLOVIR 400 MG: 200 CAPSULE ORAL at 08:32

## 2021-10-01 ASSESSMENT — ACTIVITIES OF DAILY LIVING (ADL)
ADLS_ACUITY_SCORE: 12

## 2021-10-01 ASSESSMENT — MIFFLIN-ST. JEOR: SCORE: 1419

## 2021-10-01 NOTE — PROGRESS NOTES
Brief Dermatology Note    Initial clinical concern for possible herpetic eruption and resultant chronicity 2/2 immunosuppression. HSV-2 PCR positive on swab. Discussed with team 9/30. Agree with initiation of acyclovir at present dosing. Pending culture, could cover with mupirocin BID x 10 days for possible secondary impetiginization. Deferring biopsy at present and Dermatology will follow peripherally while awaiting final cultures and VZV PCR. Please reach out with any questions.    Joel Landry MD  Internal Medicine - Dermatology PGY 4

## 2021-10-01 NOTE — PLAN OF CARE
Pt A&O X4, answers questions/follows commands appropriately. VSS, afebrile, HR sinus rhythm 60's, BP's 120's/70's, O2 sats > 90% on 40% FiO2 via BiPap at night, wears HFNC @ 40% FiO2 on 30LPM. LS clear/diminished, BS+ X4, CMS/neuros intact. Pt slept majority of overnight shift, had few requests/complaints, denied pain. PICC X3 in R arm, caps changed/heparin locked, blood return noted to all lumens. Tolerating regular diet with no nausea/emesis. Checking BG's ACHS. Voiding adequately via urinal, passing gas, no BM overnight. Gets up with minimal SBA. Has call light within reach, able to make needs known, will continue to monitor per POC.

## 2021-10-01 NOTE — PROGRESS NOTES
[facility]   Pulmonary   Progress Note  Edson Thornton MRN: [id number]  1965  Date of Admission:9/5/2021  Date of Service: 10/01/2021        Assessment & Plan    56-year-old male (BMI 27.6) with history of NSIP/ILD, RA (status post Rituxan infusion, leflunomide), transferred from [facility] for acute on chronic hypoxemic respiratory failure refractory to treatment. Now listed for transplant and treating with MMF and pred at 20 mg qday.    1. Acute on chronic hypoxemic respiratory failure, stable  2. NSIP-ILD  3. Rheumatoid arthritis (positive anti-CCP, RF)     Discussion:   Patient's O2 requirements remain unchanged despite his rising CRP and drop to prednisone 20 mg daily started on 9/27. MMF can take up to 4 weeks for its antiinflammatory effect to occur. Increased dose of MMF on 9/30 and repeat CT showed slight improvement in ground glass opacities. We note the 9mm LLL ground glass opacity which is of unclear significance at this point and will not  in the immediate future.    Recommendations    -No changes today  -Continue to trend CRP q48  -Continue MMF 1500 mg BID, pred 20 mg qday, azithromycin    We will continue to follow.     Patient seen & discussed w/  Dr. Daniels.     Jessi Weaver MD   Pulmonary/Critical Care Fellow   Pager #[phone]    Interval History      RN notes reviewed, no acute events overnight. HSV-2 returned positive on jaw lesions so started on acyclovir. SOB fairly stable. He's completing his PT exercises and still has a good appetite.    Five-point ROS is negative except for what is noted in the interval history.    Physical Exam Temp:  [98.4  F (36.9  C)-98.6  F (37  C)] 98.6  F (37  C)  Pulse:  [66-99] 66  Resp:  [16-20] 20  BP: (126-139)/(75-80) 126/76  FiO2 (%):  [40 %] 40 %  SpO2:  [92 %-99 %] 95 %  I/O last 3 completed shifts:  In: 330 [P.O.:300; I.V.:30]  Out: 1725 [Urine:1725]  Wt Readings from Last 1 Encounters:   10/01/21 67.8 kg (149 lb 7.6 oz)     Body mass index is 25.66 kg/m . FiO2 (%): 40 %  Resp: 20      Exam:  General:  Sitting upright on HFNC, cooperative, NAD.  HEENT: Anicteric sclera. EOMI.  Lungs: Bibasilar crackles; speaking in full sentences on HFNC 45% O2 at 30 LPM.   Abd: Soft, NT, ND  Ext: WWP,  No LE edema  Skin: No cyanosis, or jaundice  Neuro: AAOx3, no focal deficits    Data   Labs: reviewed in EMR and notable labs listed below.  CBC and BMP generally stable.     Imaging: reviewed in EMR and notable imaging listed below.    Chest X-Ray 2 views 9/23/21  IMPRESSION: Continued decrease in interstitial and airspace opacities,  compared to chest x-ray 9/19/2021. Enlarged cardiomediastinal  silhouette.    Chest CT high resolution 9/7/21:  IMPRESSION:   1. Extensive bilateral groundglass and reticular opacities are  slightly increased compared to chest CT from 8/30/2021. This likely  represents progression of known NSIP, however superimposed infection  or cannot be excluded.  2. Small bilateral pleural effusions.  3. Stable mediastinal lymphadenopathy, likely reactive.    CT 9/30/21  IMPRESSION: Overall slightly improved groundglass opacities and  organizing pneumonia pattern but still quite prominent. Mild  bronchiectasis. Minimal air trapping. Focal 9 mm left lower lobe  groundglass opacity was likely present before but is more conspicuous  due to the surrounding improved aeration currently. Small hiatal  hernia. Multiple nonenlarged mediastinal lymph nodes similar in  appearance.     Medications     acyclovir  400 mg Oral TID     amLODIPine  5 mg Oral Daily     azithromycin  250 mg Oral Daily     escitalopram  5 mg Oral QPM     fluticasone  1 puff Inhalation Daily     fluticasone  1 spray Both Nostrils Daily     heparin ANTICOAGULANT  5,000 Units Subcutaneous Q12H     heparin lock flush  5-20 mL Intracatheter Q24H     insulin aspart   Subcutaneous TID AC     insulin aspart  1-7 Units Subcutaneous TID AC     insulin aspart  1-5 Units  Subcutaneous At Bedtime     insulin glargine  8 Units Subcutaneous QAM AC     levothyroxine  25 mcg Oral QAM AC     melatonin  5 mg Oral At Bedtime     mycophenolate  1,500 mg Oral BID IS     pantoprazole  40 mg Oral BID AC     predniSONE  20 mg Oral Daily     sodium chloride (PF)  10-40 mL Intracatheter Q8H     sulfamethoxazole-trimethoprim  1 tablet Oral Once per day on Mon Wed Fri     vitamin D2  50,000 Units Oral Q7 Days

## 2021-10-01 NOTE — PLAN OF CARE
Neuro: A&Ox4.   Cardiac: SR. VSS.   Respiratory: Sating > 92% on 40% HFNC. MYERS.   GI/: Adequate urine output. BM X 4, becoming more loose as the day goes on.  Diet/appetite: Tolerating regular diet. Eating well.  Activity:  SBA in room.  Pain: At acceptable level on current regimen. Denies pain.  Skin: No new deficits noted.  LDA's: TL PICC hep locked    Plan: Continue with POC. Notify primary team with changes.

## 2021-10-01 NOTE — PLAN OF CARE
Care provided 15:00 - 23:00    Neuro: A&Ox4. Pt seemed sad and withdrawn this afternoon. He endorsed feeling very discouraged about the prospect of being temporarily taken of txp list if prednisone dose is increased (he said provider had informed him this was possible) and stated he was having a very hard time dealing with his present situation. He verbalized frustration with being confined in room and unable to shower or reach the bathroom, and staff frequently coming into his room and moving things beyond his reach (table, commode, IV pole with urinal, etc) for cares or tasks, then leaving without placing them back in reach. Writer provided empathetic listening.   Cardiac: SR with HR 110s this afternoon, down to 80s this alireza. VSS.   Respiratory: Sating mid 90s on HFNC @40% on 30 LPM for most of shift. Placed on BiPAP for sleep.   GI/: Adequate urine output via urinal. No BM this shift.  Diet/appetite: Tolerating regular diet with sliding scale and carb coverage. Did not eat dinner this alireza.  Activity:  Independent up to BSC. Calls appropriately.  Pain: Denies  Skin: No new deficits noted.  LDA's: R TL PICC: blood return confirmed and heparin locked.    Plan: Continue with POC. Notify primary team with changes.

## 2021-10-01 NOTE — PROVIDER NOTIFICATION
Paged overnight Gold provider Dr. Kenna Moreno with update. Pt's last COVID swab completed on 9/23, per hospital policy pt needs Q7 day testing, no COVID swab ordered, MD verbalized she will place order for test for AM & update day Primary team. Will continue to monitor per POC and update primary team with changes.

## 2021-10-01 NOTE — PROGRESS NOTES
[facility]    Medicine Progress Note - Hospitalist Service, Gold 10       Date of Admission:  9/5/2021    Assessment & Plan        56 year old male with history of NSIP associated with rheumatoid arthritis and traction bronchiectasis who was admitted on 9/5/2021 with acute on chronic hypoxic respiratory failure likely from ILD. Weaning steroids now in preparation for transplant listing. 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    Today:  -Continue MMF, per pulmonary team  -Continue current prednisone dosing  -Follow pending skin culture   -Defer biopsy at this time   -May need mupirocin for impetigo  -CT chest w/o contrast with slightly improved groundglass opacities (but still quite prominent)  -Increased white count, will repeat in a.m.  -Obtain CRP in AM    Acute hypoxic respiratory failure  secondary to progressive ILD  Although the patient had tachypnea and leukocytosis on 9/16/2021, sepsis has been effectively ruled out.  2 view chest x-ray showed improvement in the patient's known interstitial lung disease.  -Appreciate pulm consult  -Continue BiPAP at night, HFNC during day  -Continue azithromycin as an anti-inflammatory  -Continue prednisone taper based on recommendations of pulmonology service (currently on 20 mg daily)  -Continue MMF 1500 mg twice daily, per pulmonary      Lung transplant candidacy  -Perimolar abscess excision completed on 9/22/2021 in OR by dentistry. Discussed with dental team 9/24, no indication for antibiotics post tooth extraction   -Although the patient had indeterminate quanteferon gold, this was deemed to be low risk for active or latent tuberculosis based on evaluation of transplant infectious disease.  -The patient received ivermectin at 15 mg X1 dose for treatment of possible stronglydies based on recommendations of transplant ID  -Negative urinary coccidoses antigen   -The patient will need monthly coccidoses  antigen for 12 months after transplant  - When transplanted please send, in addition to the lungs, a mediastinal LN for pathology, fungal cx and AFB smear and cx.   -CT chest w/o contrast with slightly improved groundglass opacities (but still quite prominent)  -The patient will likely need to follow up with ID as an outpatient within 2 months of lung transplant to follow up on the urinary Cocci Ag and the LN and lungs biopsies.   -Azathioprine to be avoided after transplant given low TPMT based on recommendations of transplant pulmonology, however if used, then azathioprine to be used in a low-dose    HSV positive left-sided jaw lesions  Patient endorses having lesions for multiple years and will frequently pick at them.  Physical exam with chronic crusted over lesions throughout lower jaw.  Consulted to dermatology and jaw lesions HSV 2+ with initiation of acyclovir.  Preliminary plan to continue 3 times daily acyclovir for 10 days.  May need longer depending on immunosuppression/resolution of lesions.  -Consult dermatology, appreciate recs  -Continue acyclovir 400 mg 3 times daily  -Follow pending culture  -No current need for precautions (not disseminated)     Elevated Lactate - intermittent, improving  Intermittently found to have elevated lactate after triggering sepsis. Usually resolved with minimal/no interventions (I.e. small fluid bolus). Unclear etiology.      Moderate (Non-severe) malnutrition in the context of chronic illness, all are POA  -Nutrition consulted  -Calorie counts, trend weights      Thrombocytopenia   Leukocytosis - improving   May be due to stress response, illness, medications (steroids)  -Trend CBC      Vitamin D deficiency, POA  -Continue vitamin D replacement     Rheumatoid arthritis  Patient seen by rheumatology this admission. No acute flare of rheumatoid arthritis. SINCERE antibodies negative, flow cytometry negative for CD19 cells, CK normal, aldolase normal. Anti-Irene-1 negative. The  patient was treated with leflunomide from 5/21 to 7/21. Rheumatology considers that leflunomide toxicity might contribute to the patient's ILD.   -Continue cholestyramine TID to increase leflunomide clearance.      Steroid induced diabetes  -Continue insulin glargine 8 units  -Continue insulin NovoLog at medium insulin resistance     Chronic medical problems  Hypothyroidism   Continue 25 mg levothyroxin qday     SALTY  Continue BiPAP at night.      Hypertension  On 5mg amlodipine, BP well controlled today.     Anxiety  Depression  Continue 5mg escitalopram       Diet: Snacks/Supplements Adult: Other; pt may order snacks/supplements PRN; Between Meals  Snacks/Supplements Adult: Ensure Enlive; With Meals  Regular Diet Adult    DVT Prophylaxis: Heparin SQ  Watson Catheter: Not present  Central Lines: None  Code Status: Full Code      Disposition Plan   Expected discharge: 10/08/2021   recommended to prior living arrangement once Further lung transplant work-up.     The patient's care was discussed with the Patient and Pulmonary transplant Team.    Rodrigo Mckinnon MD  Hospitalist Service, [address]  Securely message with the Vocera Web Console (learn more here)  Text page via [email or web address] Paging/Directory  Please see sign in/sign out for up to date coverage information    Clinically Significant Risk Factors Present on Admission                ______________________________________________________________________    Interval History   No acute events overnight.  Doing okay overall.  No shortness of breath or chest pain.  No abdominal pain.  No fever or chills. He is still scratching his lower jaw lesions but is attempting to do it less.    Data reviewed today: I reviewed all medications, new labs and imaging results over the last 24 hours. I personally reviewed no images or EKG's today.    Physical Exam   Vital Signs: Temp: 98.6  F (37  C) Temp src: Oral BP: 126/76 Pulse: 66    Resp: 20 SpO2: 95 % O2 Device: High Flow Nasal Cannula (HFNC) Oxygen Delivery: 30 LPM  Weight: 149 lbs 7.55 oz  Constitutional: no apparent distress, pleasant and cooperative, sitting back in bed  Cardiovascular: RRR, normal S1 and S2, no murmurs, rubs, or gallops noted, no bilateral LE edema   Respiratory: Slight crackles in the posterior lung bases (equal bilaterally), no wheezing or rhonchi, normal work of breathing   GI: abdomen soft, non tender, non distended, bowel sounds present   Skin: Lower jaw with multiple crusted over lesions chronic changes (slightly less scabbing).  Neuro: alert and oriented, no gross focal deficits noted     Data   Recent Labs   Lab 10/01/21  1317 10/01/21  1111 10/01/21  0715 10/01/21  0543 09/30/21  1405 09/30/21  1059 09/29/21  0751 09/29/21  0548   WBC  --  18.7*  --  12.9*  --  15.0*   < > 11.1*   HGB  --  12.3*  --  10.6*  --  10.9*   < > 10.2*   MCV  --  92  --  93  --  93   < > 90   PLT  --  304  --  225  --  209   < > 187   NA  --   --   --  139  --  136  --  138   POTASSIUM  --   --   --  3.5  --  3.6  --  3.7   CHLORIDE  --   --   --  105  --  101  --  104   CO2  --   --   --  29  --  26  --  30   BUN  --   --   --  13  --  14  --  14   CR  --   --   --  0.69  --  0.64*  --  0.61*   ANIONGAP  --   --   --  5  --  9  --  4   ERIK  --   --   --  9.2  --  8.9  --  9.0   *  --  84 80   < > 193*   < > 86   ALBUMIN  --   --   --   --   --  2.7*  --   --    PROTTOTAL  --   --   --   --   --  6.2*  --   --    BILITOTAL  --   --   --   --   --  0.3  --   --    ALKPHOS  --   --   --   --   --  78  --   --    ALT  --   --   --   --   --  34  --   --    AST  --   --   --   --   --  12  --   --     < > = values in this interval not displayed.     No results found for this or any previous visit (from the past 24 hour(s)).  Medications     - MEDICATION INSTRUCTIONS -       - MEDICATION INSTRUCTIONS -         acyclovir  400 mg Oral TID     amLODIPine  5 mg Oral Daily      azithromycin  250 mg Oral Daily     escitalopram  5 mg Oral QPM     fluticasone  1 puff Inhalation Daily     fluticasone  1 spray Both Nostrils Daily     heparin ANTICOAGULANT  5,000 Units Subcutaneous Q12H     heparin lock flush  5-20 mL Intracatheter Q24H     insulin aspart   Subcutaneous TID AC     insulin aspart  1-7 Units Subcutaneous TID AC     insulin aspart  1-5 Units Subcutaneous At Bedtime     insulin glargine  8 Units Subcutaneous QAM AC     levothyroxine  25 mcg Oral QAM AC     melatonin  5 mg Oral At Bedtime     mycophenolate  1,500 mg Oral BID IS     pantoprazole  40 mg Oral BID AC     predniSONE  20 mg Oral Daily     sodium chloride (PF)  10-40 mL Intracatheter Q8H     sulfamethoxazole-trimethoprim  1 tablet Oral Once per day on Mon Wed Fri     vitamin D2  50,000 Units Oral Q7 Days

## 2021-10-01 NOTE — PROGRESS NOTES
Cross cover    Was paged that patient should have had his weekly asymptomatic COVID swab today. Ordered at midnight, but will wait until patient next awake to perform this unpleasant swab.     Dr. Kenna Moreno  Internal Medicine/Pediatrics      This note was created using voice recognition software and may contain minor errors.

## 2021-10-02 LAB
ALBUMIN SERPL-MCNC: 2.6 G/DL (ref 3.4–5)
ALP SERPL-CCNC: 72 U/L (ref 40–150)
ALT SERPL W P-5'-P-CCNC: 34 U/L (ref 0–70)
ANION GAP SERPL CALCULATED.3IONS-SCNC: 5 MMOL/L (ref 3–14)
AST SERPL W P-5'-P-CCNC: 13 U/L (ref 0–45)
BILIRUB SERPL-MCNC: 0.3 MG/DL (ref 0.2–1.3)
BUN SERPL-MCNC: 16 MG/DL (ref 7–30)
CALCIUM SERPL-MCNC: 9.4 MG/DL (ref 8.5–10.1)
CHLORIDE BLD-SCNC: 104 MMOL/L (ref 94–109)
CO2 SERPL-SCNC: 28 MMOL/L (ref 20–32)
CREAT SERPL-MCNC: 0.68 MG/DL (ref 0.66–1.25)
CRP SERPL-MCNC: 28 MG/L (ref 0–8)
ERYTHROCYTE [DISTWIDTH] IN BLOOD BY AUTOMATED COUNT: 17.2 % (ref 10–15)
GFR SERPL CREATININE-BSD FRML MDRD: >90 ML/MIN/1.73M2
GLUCOSE BLD-MCNC: 90 MG/DL (ref 70–99)
GLUCOSE BLDC GLUCOMTR-MCNC: 102 MG/DL (ref 70–99)
GLUCOSE BLDC GLUCOMTR-MCNC: 107 MG/DL (ref 70–99)
GLUCOSE BLDC GLUCOMTR-MCNC: 143 MG/DL (ref 70–99)
HCT VFR BLD AUTO: 32.6 % (ref 40–53)
HGB BLD-MCNC: 10.6 G/DL (ref 13.3–17.7)
LACTATE SERPL-SCNC: 1.1 MMOL/L (ref 0.7–2)
MCH RBC QN AUTO: 29.4 PG (ref 26.5–33)
MCHC RBC AUTO-ENTMCNC: 32.5 G/DL (ref 31.5–36.5)
MCV RBC AUTO: 90 FL (ref 78–100)
PLATELET # BLD AUTO: 247 10E3/UL (ref 150–450)
POTASSIUM BLD-SCNC: 3.5 MMOL/L (ref 3.4–5.3)
PROT SERPL-MCNC: 6 G/DL (ref 6.8–8.8)
RBC # BLD AUTO: 3.61 10E6/UL (ref 4.4–5.9)
SODIUM SERPL-SCNC: 137 MMOL/L (ref 133–144)
WBC # BLD AUTO: 16.7 10E3/UL (ref 4–11)

## 2021-10-02 PROCEDURE — 999N000157 HC STATISTIC RCP TIME EA 10 MIN

## 2021-10-02 PROCEDURE — 36592 COLLECT BLOOD FROM PICC: CPT | Performed by: STUDENT IN AN ORGANIZED HEALTH CARE EDUCATION/TRAINING PROGRAM

## 2021-10-02 PROCEDURE — 250N000013 HC RX MED GY IP 250 OP 250 PS 637: Performed by: DENTIST

## 2021-10-02 PROCEDURE — 250N000012 HC RX MED GY IP 250 OP 636 PS 637: Performed by: DENTIST

## 2021-10-02 PROCEDURE — 99233 SBSQ HOSP IP/OBS HIGH 50: CPT | Performed by: STUDENT IN AN ORGANIZED HEALTH CARE EDUCATION/TRAINING PROGRAM

## 2021-10-02 PROCEDURE — 250N000011 HC RX IP 250 OP 636: Performed by: INTERNAL MEDICINE

## 2021-10-02 PROCEDURE — 120N000003 HC R&B IMCU UMMC

## 2021-10-02 PROCEDURE — 85027 COMPLETE CBC AUTOMATED: CPT | Performed by: STUDENT IN AN ORGANIZED HEALTH CARE EDUCATION/TRAINING PROGRAM

## 2021-10-02 PROCEDURE — 83605 ASSAY OF LACTIC ACID: CPT | Performed by: STUDENT IN AN ORGANIZED HEALTH CARE EDUCATION/TRAINING PROGRAM

## 2021-10-02 PROCEDURE — 250N000013 HC RX MED GY IP 250 OP 250 PS 637: Performed by: STUDENT IN AN ORGANIZED HEALTH CARE EDUCATION/TRAINING PROGRAM

## 2021-10-02 PROCEDURE — 250N000012 HC RX MED GY IP 250 OP 636 PS 637: Performed by: STUDENT IN AN ORGANIZED HEALTH CARE EDUCATION/TRAINING PROGRAM

## 2021-10-02 PROCEDURE — 94660 CPAP INITIATION&MGMT: CPT

## 2021-10-02 PROCEDURE — 80053 COMPREHEN METABOLIC PANEL: CPT | Performed by: STUDENT IN AN ORGANIZED HEALTH CARE EDUCATION/TRAINING PROGRAM

## 2021-10-02 PROCEDURE — 86140 C-REACTIVE PROTEIN: CPT | Performed by: STUDENT IN AN ORGANIZED HEALTH CARE EDUCATION/TRAINING PROGRAM

## 2021-10-02 PROCEDURE — 250N000011 HC RX IP 250 OP 636: Performed by: PHYSICIAN ASSISTANT

## 2021-10-02 RX ADMIN — AZITHROMYCIN MONOHYDRATE 250 MG: 250 TABLET ORAL at 08:18

## 2021-10-02 RX ADMIN — HEPARIN SODIUM 5000 UNITS: 5000 INJECTION, SOLUTION INTRAVENOUS; SUBCUTANEOUS at 21:22

## 2021-10-02 RX ADMIN — MYCOPHENOLATE MOFETIL 1500 MG: 500 TABLET, FILM COATED ORAL at 08:18

## 2021-10-02 RX ADMIN — PANTOPRAZOLE SODIUM 40 MG: 40 TABLET, DELAYED RELEASE ORAL at 15:37

## 2021-10-02 RX ADMIN — ACYCLOVIR 400 MG: 200 CAPSULE ORAL at 08:17

## 2021-10-02 RX ADMIN — Medication 5 MG: at 21:22

## 2021-10-02 RX ADMIN — MYCOPHENOLATE MOFETIL 1500 MG: 500 TABLET, FILM COATED ORAL at 18:10

## 2021-10-02 RX ADMIN — ACYCLOVIR 400 MG: 200 CAPSULE ORAL at 14:05

## 2021-10-02 RX ADMIN — LEVOTHYROXINE SODIUM 25 MCG: 0.03 TABLET ORAL at 08:18

## 2021-10-02 RX ADMIN — ESCITALOPRAM OXALATE 5 MG: 5 TABLET, FILM COATED ORAL at 21:21

## 2021-10-02 RX ADMIN — FLUTICASONE FUROATE 1 PUFF: 100 POWDER RESPIRATORY (INHALATION) at 08:18

## 2021-10-02 RX ADMIN — INSULIN ASPART 5 UNITS: 100 INJECTION, SOLUTION INTRAVENOUS; SUBCUTANEOUS at 10:09

## 2021-10-02 RX ADMIN — PREDNISONE 20 MG: 20 TABLET ORAL at 08:18

## 2021-10-02 RX ADMIN — AMLODIPINE BESYLATE 5 MG: 5 TABLET ORAL at 08:18

## 2021-10-02 RX ADMIN — INSULIN ASPART 6 UNITS: 100 INJECTION, SOLUTION INTRAVENOUS; SUBCUTANEOUS at 18:14

## 2021-10-02 RX ADMIN — FLUTICASONE PROPIONATE 1 SPRAY: 50 SPRAY, METERED NASAL at 08:18

## 2021-10-02 RX ADMIN — ACYCLOVIR 400 MG: 200 CAPSULE ORAL at 21:21

## 2021-10-02 RX ADMIN — HEPARIN SODIUM 5000 UNITS: 5000 INJECTION, SOLUTION INTRAVENOUS; SUBCUTANEOUS at 08:18

## 2021-10-02 RX ADMIN — SODIUM CHLORIDE, PRESERVATIVE FREE 15 ML: 5 INJECTION INTRAVENOUS at 14:06

## 2021-10-02 RX ADMIN — PANTOPRAZOLE SODIUM 40 MG: 40 TABLET, DELAYED RELEASE ORAL at 08:18

## 2021-10-02 ASSESSMENT — ACTIVITIES OF DAILY LIVING (ADL)
ADLS_ACUITY_SCORE: 12

## 2021-10-02 NOTE — PROGRESS NOTES
[facility]    Medicine Progress Note - Hospitalist Service, Gold 10       Date of Admission:  9/5/2021    Assessment & Plan        56 year old male with history of NSIP associated with rheumatoid arthritis and traction bronchiectasis who was admitted on 9/5/2021 with acute on chronic hypoxic respiratory failure likely from ILD. Weaning steroids now in preparation for transplant listing. 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    Today:  -Continue MMF, per pulmonary team (patient did mention some GI upset), discussed  -Continue current prednisone dosing  -Follow pending skin culture   -Defer biopsy at this time   -May need mupirocin for impetigo  -Decreased hemoglobin back to recent baseline, will recheck in a.m.  -Decreasing CRP    Acute hypoxic respiratory failure  secondary to progressive ILD  Although the patient had tachypnea and leukocytosis on 9/16/2021, sepsis has been effectively ruled out.  2 view chest x-ray showed improvement in the patient's known interstitial lung disease.  -Appreciate pulm consult  -Continue BiPAP at night, HFNC during day  -Continue azithromycin as an anti-inflammatory  -Continue prednisone taper based on recommendations of pulmonology service (currently on 20 mg daily)  -Continue MMF 1500 mg twice daily, per pulmonary      Lung transplant candidacy  -Perimolar abscess excision completed on 9/22/2021 in OR by dentistry. Discussed with dental team 9/24, no indication for antibiotics post tooth extraction   -Although the patient had indeterminate quanteferon gold, this was deemed to be low risk for active or latent tuberculosis based on evaluation of transplant infectious disease.  -The patient received ivermectin at 15 mg X1 dose for treatment of possible stronglydies based on recommendations of transplant ID  -Negative urinary coccidoses antigen   -The patient will need monthly coccidoses antigen for 12 months after  transplant  - When transplanted please send, in addition to the lungs, a mediastinal LN for pathology, fungal cx and AFB smear and cx.   -CT chest w/o contrast with slightly improved groundglass opacities (but still quite prominent)  -The patient will likely need to follow up with ID as an outpatient within 2 months of lung transplant to follow up on the urinary Cocci Ag and the LN and lungs biopsies.   -Azathioprine to be avoided after transplant given low TPMT based on recommendations of transplant pulmonology, however if used, then azathioprine to be used in a low-dose    HSV positive left-sided jaw lesions  Patient endorses having lesions for multiple years and will frequently pick at them.  Physical exam with chronic crusted over lesions throughout lower jaw.  Consulted to dermatology and jaw lesions HSV 2+ with initiation of acyclovir.  Preliminary plan to continue 3 times daily acyclovir for 10 days.  May need longer depending on immunosuppression/resolution of lesions.  -Consult dermatology, appreciate recs  -Continue acyclovir 400 mg 3 times daily  -Follow pending culture  -No current need for precautions (not disseminated)     Elevated Lactate - intermittent, improving  Intermittently found to have elevated lactate after triggering sepsis. Usually resolved with minimal/no interventions (I.e. small fluid bolus). Unclear etiology.      Moderate (Non-severe) malnutrition in the context of chronic illness, all are POA  -Nutrition consulted  -Calorie counts, trend weights      Thrombocytopenia   Leukocytosis - improving   Anemia  May be due to stress response, illness, medications (steroids)  -Trend CBC      Vitamin D deficiency, POA  -Continue vitamin D replacement     Rheumatoid arthritis  Patient seen by rheumatology this admission. No acute flare of rheumatoid arthritis. SINCERE antibodies negative, flow cytometry negative for CD19 cells, CK normal, aldolase normal. Anti-Irene-1 negative. The patient was treated  with leflunomide from 5/21 to 7/21. Rheumatology considers that leflunomide toxicity might contribute to the patient's ILD.   -Continue cholestyramine TID to increase leflunomide clearance.      Steroid induced diabetes  -Continue insulin glargine 8 units  -Continue insulin NovoLog at medium insulin resistance     Chronic medical problems  Hypothyroidism   Continue 25 mg levothyroxin qday     SALTY  Continue BiPAP at night.      Hypertension  On 5mg amlodipine, BP well controlled today.     Anxiety  Depression  Continue 5mg escitalopram       Diet: Snacks/Supplements Adult: Other; pt may order snacks/supplements PRN; Between Meals  Snacks/Supplements Adult: Ensure Enlive; With Meals  Regular Diet Adult    DVT Prophylaxis: Heparin SQ  Watson Catheter: Not present  Central Lines: None  Code Status: Full Code      Disposition Plan   Expected discharge: 10/08/2021   recommended to prior living arrangement once Further lung transplant work-up/lung transplant occurs.     The patient's care was discussed with the Bedside Nurse, Patient and Pulmonary and pulmonary transplant Team.    Rodrigo Mckinnon MD  Hospitalist Service, [address]  Securely message with the Vocera Web Console (learn more here)  Text page via [facility] Paging/Directory  Please see sign in/sign out for up to date coverage information    Clinically Significant Risk Factors Present on Admission                ______________________________________________________________________    Interval History   Doing okay overall.  No acute events overnight.  No chest pain.  No abdominal pain.  He is avoiding scratching his lower jaw lesions.  No fevers.  No blood per any orifice.  He is having mild GI upset and believes it might be from the MMF.    Data reviewed today: I reviewed all medications, new labs and imaging results over the last 24 hours. I personally reviewed no images or EKG's today.    Physical Exam   Vital  Signs: Temp: 97.5  F (36.4  C) Temp src: Axillary BP: 136/77 Pulse: 66   Resp: 20 SpO2: 94 % O2 Device: BiPAP/CPAP Oxygen Delivery: 30 LPM  Weight: 149 lbs 7.55 oz  Constitutional: no apparent distress, pleasant and cooperative, sitting back in bed, mask in place  Cardiovascular: RRR, normal S1 and S2, no murmurs, rubs, or gallops noted, no bilateral LE edema   Respiratory: Minor crackles in the posterior lung bases (equal bilaterally), no wheezing or rhonchi, normal work of breathing   GI: abdomen soft, non tender, non distended, bowel sounds present   Skin: Lower jaw with multiple crusted over lesions chronic changes (stable).  Neuro: alert and oriented, no gross focal deficits noted     Data   Recent Labs   Lab 10/02/21  1007 10/02/21  0528 10/01/21  2233 10/01/21  1317 10/01/21  1111 10/01/21  0715 10/01/21  0543 09/30/21  1405 09/30/21  1059   WBC  --  16.7*  --   --  18.7*  --  12.9*   < > 15.0*   HGB  --  10.6*  --   --  12.3*  --  10.6*   < > 10.9*   MCV  --  90  --   --  92  --  93   < > 93   PLT  --  247  --   --  304  --  225   < > 209   NA  --  137  --   --   --   --  139  --  136   POTASSIUM  --  3.5  --   --   --   --  3.5  --  3.6   CHLORIDE  --  104  --   --   --   --  105  --  101   CO2  --  28  --   --   --   --  29  --  26   BUN  --  16  --   --   --   --  13  --  14   CR  --  0.68  --   --   --   --  0.69  --  0.64*   ANIONGAP  --  5  --   --   --   --  5  --  9   ERIK  --  9.4  --   --   --   --  9.2  --  8.9   * 90 176*   < >  --    < > 80   < > 193*   ALBUMIN  --  2.6*  --   --   --   --   --   --  2.7*   PROTTOTAL  --  6.0*  --   --   --   --   --   --  6.2*   BILITOTAL  --  0.3  --   --   --   --   --   --  0.3   ALKPHOS  --  72  --   --   --   --   --   --  78   ALT  --  34  --   --   --   --   --   --  34   AST  --  13  --   --   --   --   --   --  12    < > = values in this interval not displayed.     No results found for this or any previous visit (from the past 24  hour(s)).  Medications     - MEDICATION INSTRUCTIONS -       - MEDICATION INSTRUCTIONS -         acyclovir  400 mg Oral TID     amLODIPine  5 mg Oral Daily     azithromycin  250 mg Oral Daily     escitalopram  5 mg Oral QPM     fluticasone  1 puff Inhalation Daily     fluticasone  1 spray Both Nostrils Daily     heparin ANTICOAGULANT  5,000 Units Subcutaneous Q12H     heparin lock flush  5-20 mL Intracatheter Q24H     insulin aspart   Subcutaneous TID AC     insulin aspart  1-7 Units Subcutaneous TID AC     insulin aspart  1-5 Units Subcutaneous At Bedtime     insulin glargine  8 Units Subcutaneous QAM AC     levothyroxine  25 mcg Oral QAM AC     melatonin  5 mg Oral At Bedtime     mycophenolate  1,500 mg Oral BID IS     pantoprazole  40 mg Oral BID AC     predniSONE  20 mg Oral Daily     sodium chloride (PF)  10-40 mL Intracatheter Q8H     sulfamethoxazole-trimethoprim  1 tablet Oral Once per day on Mon Wed Fri     vitamin D2  50,000 Units Oral Q7 Days

## 2021-10-02 NOTE — PLAN OF CARE
Pt A&O X4, answers questions/follows commands appropriately. VSS, afebrile, HR sinus rhythm 90's, BP's 120-140's/80's, O2 sats > 90% on 40% FiO2 via BiPap at night, wears HFNC @ 40% FiO2 on 30LPM. LS clear/diminished, BS+ X4, CMS/neuros intact. Pt slept majority of overnight shift, had few requests/complaints, denied pain. PICC X3 in R arm, patent & Heparin locked. Tolerating regular diet with no nausea/emesis. Checking BG's ACHS. Voiding adequately via urinal, passing gas, no BM overnight. Gets up with minimal SBA. Has call light within reach, able to make needs known, will continue to monitor per POC.

## 2021-10-02 NOTE — PLAN OF CARE
Neuro: A&Ox4. Withdrawn/tired today.  Cardiac: SR. VSS.   Respiratory: Sating 96% on 40% FIO2 on HFNC.  GI/: Adequate urine output. No BM this shift.    Diet/appetite: Tolerating regular diet. Eating well.  Activity:  SBA in room.  Pain: Denies pain.  Skin: No new deficits noted.  LDA's: Right TL PICC hep locked    Plan: Continue with POC. Notify primary team with changes.

## 2021-10-03 LAB
ANION GAP SERPL CALCULATED.3IONS-SCNC: 6 MMOL/L (ref 3–14)
BUN SERPL-MCNC: 14 MG/DL (ref 7–30)
CALCIUM SERPL-MCNC: 9 MG/DL (ref 8.5–10.1)
CHLORIDE BLD-SCNC: 106 MMOL/L (ref 94–109)
CO2 SERPL-SCNC: 28 MMOL/L (ref 20–32)
CREAT SERPL-MCNC: 0.73 MG/DL (ref 0.66–1.25)
ERYTHROCYTE [DISTWIDTH] IN BLOOD BY AUTOMATED COUNT: 17.2 % (ref 10–15)
GFR SERPL CREATININE-BSD FRML MDRD: >90 ML/MIN/1.73M2
GLUCOSE BLD-MCNC: 80 MG/DL (ref 70–99)
GLUCOSE BLDC GLUCOMTR-MCNC: 127 MG/DL (ref 70–99)
GLUCOSE BLDC GLUCOMTR-MCNC: 129 MG/DL (ref 70–99)
GLUCOSE BLDC GLUCOMTR-MCNC: 81 MG/DL (ref 70–99)
GLUCOSE BLDC GLUCOMTR-MCNC: 84 MG/DL (ref 70–99)
HCT VFR BLD AUTO: 33.7 % (ref 40–53)
HGB BLD-MCNC: 10.8 G/DL (ref 13.3–17.7)
MCH RBC QN AUTO: 29.5 PG (ref 26.5–33)
MCHC RBC AUTO-ENTMCNC: 32 G/DL (ref 31.5–36.5)
MCV RBC AUTO: 92 FL (ref 78–100)
PLATELET # BLD AUTO: 231 10E3/UL (ref 150–450)
POTASSIUM BLD-SCNC: 3.5 MMOL/L (ref 3.4–5.3)
RBC # BLD AUTO: 3.66 10E6/UL (ref 4.4–5.9)
SODIUM SERPL-SCNC: 140 MMOL/L (ref 133–144)
WBC # BLD AUTO: 17 10E3/UL (ref 4–11)

## 2021-10-03 PROCEDURE — 80048 BASIC METABOLIC PNL TOTAL CA: CPT | Performed by: STUDENT IN AN ORGANIZED HEALTH CARE EDUCATION/TRAINING PROGRAM

## 2021-10-03 PROCEDURE — 250N000013 HC RX MED GY IP 250 OP 250 PS 637: Performed by: DENTIST

## 2021-10-03 PROCEDURE — 250N000011 HC RX IP 250 OP 636: Performed by: PHYSICIAN ASSISTANT

## 2021-10-03 PROCEDURE — 250N000012 HC RX MED GY IP 250 OP 636 PS 637: Performed by: DENTIST

## 2021-10-03 PROCEDURE — 250N000013 HC RX MED GY IP 250 OP 250 PS 637: Performed by: STUDENT IN AN ORGANIZED HEALTH CARE EDUCATION/TRAINING PROGRAM

## 2021-10-03 PROCEDURE — 99233 SBSQ HOSP IP/OBS HIGH 50: CPT | Mod: GC | Performed by: INTERNAL MEDICINE

## 2021-10-03 PROCEDURE — 999N000157 HC STATISTIC RCP TIME EA 10 MIN

## 2021-10-03 PROCEDURE — 94660 CPAP INITIATION&MGMT: CPT

## 2021-10-03 PROCEDURE — 120N000003 HC R&B IMCU UMMC

## 2021-10-03 PROCEDURE — 99233 SBSQ HOSP IP/OBS HIGH 50: CPT | Performed by: STUDENT IN AN ORGANIZED HEALTH CARE EDUCATION/TRAINING PROGRAM

## 2021-10-03 PROCEDURE — 250N000012 HC RX MED GY IP 250 OP 636 PS 637: Performed by: STUDENT IN AN ORGANIZED HEALTH CARE EDUCATION/TRAINING PROGRAM

## 2021-10-03 PROCEDURE — 36592 COLLECT BLOOD FROM PICC: CPT | Performed by: STUDENT IN AN ORGANIZED HEALTH CARE EDUCATION/TRAINING PROGRAM

## 2021-10-03 PROCEDURE — 250N000011 HC RX IP 250 OP 636: Performed by: INTERNAL MEDICINE

## 2021-10-03 PROCEDURE — 85027 COMPLETE CBC AUTOMATED: CPT | Performed by: STUDENT IN AN ORGANIZED HEALTH CARE EDUCATION/TRAINING PROGRAM

## 2021-10-03 RX ADMIN — AZITHROMYCIN MONOHYDRATE 250 MG: 250 TABLET ORAL at 08:36

## 2021-10-03 RX ADMIN — PANTOPRAZOLE SODIUM 40 MG: 40 TABLET, DELAYED RELEASE ORAL at 08:36

## 2021-10-03 RX ADMIN — ACYCLOVIR 400 MG: 200 CAPSULE ORAL at 19:53

## 2021-10-03 RX ADMIN — ACYCLOVIR 400 MG: 200 CAPSULE ORAL at 08:36

## 2021-10-03 RX ADMIN — FLUTICASONE FUROATE 1 PUFF: 100 POWDER RESPIRATORY (INHALATION) at 08:36

## 2021-10-03 RX ADMIN — PREDNISONE 20 MG: 20 TABLET ORAL at 08:36

## 2021-10-03 RX ADMIN — INSULIN ASPART 2 UNITS: 100 INJECTION, SOLUTION INTRAVENOUS; SUBCUTANEOUS at 09:45

## 2021-10-03 RX ADMIN — INSULIN ASPART 11 UNITS: 100 INJECTION, SOLUTION INTRAVENOUS; SUBCUTANEOUS at 16:36

## 2021-10-03 RX ADMIN — MYCOPHENOLATE MOFETIL 1500 MG: 500 TABLET, FILM COATED ORAL at 17:58

## 2021-10-03 RX ADMIN — HEPARIN SODIUM 5000 UNITS: 5000 INJECTION, SOLUTION INTRAVENOUS; SUBCUTANEOUS at 19:53

## 2021-10-03 RX ADMIN — MYCOPHENOLATE MOFETIL 1500 MG: 500 TABLET, FILM COATED ORAL at 08:36

## 2021-10-03 RX ADMIN — FLUTICASONE PROPIONATE 1 SPRAY: 50 SPRAY, METERED NASAL at 08:36

## 2021-10-03 RX ADMIN — ESCITALOPRAM OXALATE 5 MG: 5 TABLET, FILM COATED ORAL at 19:53

## 2021-10-03 RX ADMIN — AMLODIPINE BESYLATE 5 MG: 5 TABLET ORAL at 08:36

## 2021-10-03 RX ADMIN — HEPARIN SODIUM 5000 UNITS: 5000 INJECTION, SOLUTION INTRAVENOUS; SUBCUTANEOUS at 08:36

## 2021-10-03 RX ADMIN — INSULIN ASPART 6 UNITS: 100 INJECTION, SOLUTION INTRAVENOUS; SUBCUTANEOUS at 18:38

## 2021-10-03 RX ADMIN — ACYCLOVIR 400 MG: 200 CAPSULE ORAL at 14:43

## 2021-10-03 RX ADMIN — PANTOPRAZOLE SODIUM 40 MG: 40 TABLET, DELAYED RELEASE ORAL at 16:40

## 2021-10-03 RX ADMIN — LEVOTHYROXINE SODIUM 25 MCG: 0.03 TABLET ORAL at 08:36

## 2021-10-03 RX ADMIN — Medication 5 MG: at 19:53

## 2021-10-03 RX ADMIN — SODIUM CHLORIDE, PRESERVATIVE FREE 15 ML: 5 INJECTION INTRAVENOUS at 14:43

## 2021-10-03 ASSESSMENT — ACTIVITIES OF DAILY LIVING (ADL)
ADLS_ACUITY_SCORE: 12

## 2021-10-03 ASSESSMENT — MIFFLIN-ST. JEOR: SCORE: 1425

## 2021-10-03 NOTE — PROGRESS NOTES
[facility]   Pulmonary   Progress Note  Edson Thornton MRN: [id number]  1965  Date of Admission:9/5/2021  Date of Service: 10/03/2021        Assessment & Plan    56-year-old male (BMI 27.6) with history of NSIP/ILD, RA (status post Rituxan infusion, leflunomide), transferred from OSH for acute on chronic hypoxemic respiratory failure refractory to treatment. Now listed for transplant and treating with MMF and pred at 20 mg qday.    1. Acute on chronic hypoxemic respiratory failure, stable  2. NSIP-ILD  3. Rheumatoid arthritis (positive anti-CCP, RF)     Discussion:   Patient's O2 requirements remain unchanged despite his rising CRP and drop in prednisone 20 mg daily started on 9/27. MMF can take up to 4 weeks for its antiinflammatory effect to occur. Increased dose of MMF on 9/30 and repeat CT showed slight improvement in ground glass opacities. We note the 9mm LLL ground glass opacity which is of unclear significance at this point and will not  in the immediate future.    Recommendations    -No changes today  -Continue to trend CRP q48  -Continue MMF 1500 mg BID, pred 20 mg qday, azithromycin    We will continue to follow.     Patient seen & discussed w/  Dr. Rubio.     Jessi Weaver MD   Pulmonary/Critical Care Fellow   Pager #[phone]    Interval History      RN notes reviewed, no acute events overnight. He was having some loose stools after starting increased dose of MMF but today has had 2 solid BMs. Has been getting out of bed and working on a health diet.     Five-point ROS is negative except for what is noted in the interval history.    Physical Exam Temp:  [97.9  F (36.6  C)-98.3  F (36.8  C)] 97.9  F (36.6  C)  Pulse:  [64-93] 89  Resp:  [18-24] 21  BP: (120-135)/(71-79) 125/78  FiO2 (%):  [40 %-45 %] 40 %  SpO2:  [90 %-97 %] 97 %  I/O last 3 completed shifts:  In: 380 [P.O.:360; I.V.:20]  Out: 1400 [Urine:1400]  Wt Readings from Last 1 Encounters:   10/03/21  68.4 kg (150 lb 12.7 oz)    Body mass index is 25.88 kg/m . FiO2 (%): 40 %  Resp: 21      Exam:  General:  Sitting upright on HFNC, cooperative, NAD.  HEENT: Anicteric sclera. EOMI.  Lungs: Bibasilar crackles; speaking in full sentences on HFNC 45% O2 at 30 LPM.   Abd: Soft, NT, ND  Ext: WWP,  No LE edema  Skin: No cyanosis, or jaundice  Neuro: AAOx3, no focal deficits    Data   Labs: reviewed in EMR and notable labs listed below.  CBC and BMP generally stable.     Imaging: reviewed in EMR and notable imaging listed below.    Chest X-Ray 2 views 9/23/21  IMPRESSION: Continued decrease in interstitial and airspace opacities,  compared to chest x-ray 9/19/2021. Enlarged cardiomediastinal  silhouette.    Chest CT high resolution 9/7/21:  IMPRESSION:   1. Extensive bilateral groundglass and reticular opacities are  slightly increased compared to chest CT from 8/30/2021. This likely  represents progression of known NSIP, however superimposed infection  or cannot be excluded.  2. Small bilateral pleural effusions.  3. Stable mediastinal lymphadenopathy, likely reactive.    CT 9/30/21  IMPRESSION: Overall slightly improved groundglass opacities and  organizing pneumonia pattern but still quite prominent. Mild  bronchiectasis. Minimal air trapping. Focal 9 mm left lower lobe  groundglass opacity was likely present before but is more conspicuous  due to the surrounding improved aeration currently. Small hiatal  hernia. Multiple nonenlarged mediastinal lymph nodes similar in  appearance.     Medications     acyclovir  400 mg Oral TID     amLODIPine  5 mg Oral Daily     azithromycin  250 mg Oral Daily     escitalopram  5 mg Oral QPM     fluticasone  1 puff Inhalation Daily     fluticasone  1 spray Both Nostrils Daily     heparin ANTICOAGULANT  5,000 Units Subcutaneous Q12H     heparin lock flush  5-20 mL Intracatheter Q24H     insulin aspart   Subcutaneous TID AC     insulin aspart  1-7 Units Subcutaneous TID AC     insulin  aspart  1-5 Units Subcutaneous At Bedtime     insulin glargine  8 Units Subcutaneous QAM AC     levothyroxine  25 mcg Oral QAM AC     melatonin  5 mg Oral At Bedtime     mycophenolate  1,500 mg Oral BID IS     pantoprazole  40 mg Oral BID AC     predniSONE  20 mg Oral Daily     sodium chloride (PF)  10-40 mL Intracatheter Q8H     sulfamethoxazole-trimethoprim  1 tablet Oral Once per day on Mon Wed Fri     vitamin D2  50,000 Units Oral Q7 Days

## 2021-10-03 NOTE — PROGRESS NOTES
[facility]    Medicine Progress Note - Hospitalist Service, Gold 10       Date of Admission:  9/5/2021    Assessment & Plan        56 year old male with history of NSIP associated with rheumatoid arthritis and traction bronchiectasis who was admitted on 9/5/2021 with acute on chronic hypoxic respiratory failure likely from ILD. Weaning steroids now in preparation for transplant listing. 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    Today:  -Continue MMF, per pulmonary team  -Continue current prednisone dosing  -Follow pending skin culture   -Defer biopsy at this time   -May need mupirocin for impetigo  -Monitor CRP in a.m.  -WBC about stable, will recheck in the a.m.    Acute hypoxic respiratory failure  secondary to progressive ILD  Although the patient had tachypnea and leukocytosis on 9/16/2021, sepsis has been effectively ruled out.  2 view chest x-ray showed improvement in the patient's known interstitial lung disease.  -Appreciate pulm consult  -Continue BiPAP at night, HFNC during day  -Continue azithromycin as an anti-inflammatory  -Continue prednisone taper based on recommendations of pulmonology service (currently on 20 mg daily)  -Continue MMF 1500 mg twice daily, per pulmonary      Lung transplant candidacy  -Perimolar abscess excision completed on 9/22/2021 in OR by dentistry. Discussed with dental team 9/24, no indication for antibiotics post tooth extraction   -Although the patient had indeterminate quanteferon gold, this was deemed to be low risk for active or latent tuberculosis based on evaluation of transplant infectious disease.  -The patient received ivermectin at 15 mg X1 dose for treatment of possible stronglydies based on recommendations of transplant ID  -Negative urinary coccidoses antigen   -The patient will need monthly coccidoses antigen for 12 months after transplant  - When transplanted please send, in addition to the lungs,  a mediastinal LN for pathology, fungal cx and AFB smear and cx.   -CT chest w/o contrast with slightly improved groundglass opacities (but still quite prominent)  -The patient will likely need to follow up with ID as an outpatient within 2 months of lung transplant to follow up on the urinary Cocci Ag and the LN and lungs biopsies.   -Azathioprine to be avoided after transplant given low TPMT based on recommendations of transplant pulmonology, however if used, then azathioprine to be used in a low-dose    HSV positive left-sided jaw lesions  Patient endorses having lesions for multiple years and will frequently pick at them.  Physical exam with chronic crusted over lesions throughout lower jaw.  Consulted to dermatology and jaw lesions HSV 2+ with initiation of acyclovir.  Preliminary plan to continue 3 times daily acyclovir for 10 days.  May need longer depending on immunosuppression/resolution of lesions.  -Consult dermatology, appreciate recs  -Continue acyclovir 400 mg 3 times daily  -Follow pending culture  -No current need for precautions (not disseminated)     Elevated Lactate - intermittent, improving  Intermittently found to have elevated lactate after triggering sepsis. Usually resolved with minimal/no interventions (I.e. small fluid bolus). Unclear etiology.      Moderate (Non-severe) malnutrition in the context of chronic illness, all are POA  -Nutrition consulted  -Calorie counts, trend weights      Thrombocytopenia   Leukocytosis - improving   Anemia  May be due to stress response, illness, medications (steroids)  -Trend CBC      Vitamin D deficiency, POA  -Continue vitamin D replacement     Rheumatoid arthritis  Patient seen by rheumatology this admission. No acute flare of rheumatoid arthritis. SINCERE antibodies negative, flow cytometry negative for CD19 cells, CK normal, aldolase normal. Anti-Irene-1 negative. The patient was treated with leflunomide from 5/21 to 7/21. Rheumatology considers that  leflunomide toxicity might contribute to the patient's ILD.   -Continue cholestyramine TID to increase leflunomide clearance.      Steroid induced diabetes  -Continue insulin glargine 8 units  -Continue insulin NovoLog at medium insulin resistance     Chronic medical problems  Hypothyroidism   Continue 25 mg levothyroxin qday     SALTY  Continue BiPAP at night.      Hypertension  On 5mg amlodipine, BP well controlled today.     Anxiety  Depression  Continue 5mg escitalopram       Diet: Snacks/Supplements Adult: Other; pt may order snacks/supplements PRN; Between Meals  Snacks/Supplements Adult: Ensure Enlive; With Meals  Regular Diet Adult    DVT Prophylaxis: Heparin SQ  Watson Catheter: Not present  Central Lines: None  Code Status: Full Code      Disposition Plan   Expected discharge: 10/08/2021   recommended to prior living arrangement once Further lung transplant work-up/lung transplant occurs.     The patient's care was discussed with the Patient and Pulmonary transplant Team.    Rodrigo Mckinnon MD  Hospitalist Service, [address]  Securely message with the Vocera Web Console (learn more here)  Text page via [phone] Paging/Directory  Please see sign in/sign out for up to date coverage information    Clinically Significant Risk Factors Present on Admission                ______________________________________________________________________    Interval History   Patient is doing a little better today.  His GI upset is decreased.  No acute events overnight.  No fever or chills.  He is abstaining from picking at his lower jaw lesions.  No chest pain or shortness of breath.    Data reviewed today: I reviewed all medications, new labs and imaging results over the last 24 hours. I personally reviewed no images or EKG's today.    Physical Exam   Vital Signs: Temp: 97.9  F (36.6  C) Temp src: Axillary BP: 125/78 Pulse: 64   Resp: 21 SpO2: 97 % O2 Device: BiPAP/CPAP  Oxygen Delivery: 30 LPM  Weight: 150 lbs 12.71 oz  Constitutional: no apparent distress, pleasant and cooperative, sitting back in bed, mask in place  Cardiovascular: RRR, normal S1 and S2, no murmurs, rubs, or gallops noted, no bilateral LE edema   Respiratory: Minor crackles in the posterior lung bases (stable), no wheezing or rhonchi, normal work of breathing   GI: abdomen soft, non tender, non distended, bowel sounds present   Skin: Lower jaw with multiple crusted over lesions chronic changes (stable).  Neuro: alert and oriented, no gross focal deficits noted     Data   Recent Labs   Lab 10/03/21  0936 10/03/21  0540 10/02/21  2139 10/02/21  1007 10/02/21  0528 10/01/21  1317 10/01/21  1111 10/01/21  0715 10/01/21  0543 09/30/21  1405 09/30/21  1059   WBC  --  17.0*  --   --  16.7*  --  18.7*   < > 12.9*   < > 15.0*   HGB  --  10.8*  --   --  10.6*  --  12.3*   < > 10.6*   < > 10.9*   MCV  --  92  --   --  90  --  92   < > 93   < > 93   PLT  --  231  --   --  247  --  304   < > 225   < > 209   NA  --  140  --   --  137  --   --   --  139   < > 136   POTASSIUM  --  3.5  --   --  3.5  --   --   --  3.5   < > 3.6   CHLORIDE  --  106  --   --  104  --   --   --  105   < > 101   CO2  --  28  --   --  28  --   --   --  29   < > 26   BUN  --  14  --   --  16  --   --   --  13   < > 14   CR  --  0.73  --   --  0.68  --   --   --  0.69   < > 0.64*   ANIONGAP  --  6  --   --  5  --   --   --  5   < > 9   ERIK  --  9.0  --   --  9.4  --   --   --  9.2   < > 8.9   GLC 84 80 143*   < > 90   < >  --    < > 80   < > 193*   ALBUMIN  --   --   --   --  2.6*  --   --   --   --   --  2.7*   PROTTOTAL  --   --   --   --  6.0*  --   --   --   --   --  6.2*   BILITOTAL  --   --   --   --  0.3  --   --   --   --   --  0.3   ALKPHOS  --   --   --   --  72  --   --   --   --   --  78   ALT  --   --   --   --  34  --   --   --   --   --  34   AST  --   --   --   --  13  --   --   --   --   --  12    < > = values in this interval not  displayed.     No results found for this or any previous visit (from the past 24 hour(s)).  Medications     - MEDICATION INSTRUCTIONS -       - MEDICATION INSTRUCTIONS -         acyclovir  400 mg Oral TID     amLODIPine  5 mg Oral Daily     azithromycin  250 mg Oral Daily     escitalopram  5 mg Oral QPM     fluticasone  1 puff Inhalation Daily     fluticasone  1 spray Both Nostrils Daily     heparin ANTICOAGULANT  5,000 Units Subcutaneous Q12H     heparin lock flush  5-20 mL Intracatheter Q24H     insulin aspart   Subcutaneous TID AC     insulin aspart  1-7 Units Subcutaneous TID AC     insulin aspart  1-5 Units Subcutaneous At Bedtime     insulin glargine  8 Units Subcutaneous QAM AC     levothyroxine  25 mcg Oral QAM AC     melatonin  5 mg Oral At Bedtime     mycophenolate  1,500 mg Oral BID IS     pantoprazole  40 mg Oral BID AC     predniSONE  20 mg Oral Daily     sodium chloride (PF)  10-40 mL Intracatheter Q8H     sulfamethoxazole-trimethoprim  1 tablet Oral Once per day on Mon Wed Fri     vitamin D2  50,000 Units Oral Q7 Days

## 2021-10-03 NOTE — PLAN OF CARE
Neuro: A&Ox4.   Cardiac: SR. VSS.   Respiratory: Sating > 92% on 40% HFNC.  GI/: Adequate urine output. BM X2  Diet/appetite: Tolerating regular diet. Eating well.  Activity:  SBA in room.  Pain: At acceptable level on current regimen.   Skin: No new deficits noted.  LDA's: TL PICC hep locked    Plan: Continue with POC. Notify primary team with changes.

## 2021-10-03 NOTE — PLAN OF CARE
Pt A&O X4, answers questions/follows commands appropriately. VSS, afebrile, HR sinus rhythm 90's, BP's 120-130's/80's, O2 sats > 90% on 40% FiO2 via BiPap at night, wears HFNC @ 40% FiO2 on 30LPM. LS clear/diminished, BS+ X4, CMS/neuros intact. Pt slept majority of overnight shift, had few requests/complaints, denied pain. PICC X3 in R arm, patent & Heparin locked. Tolerating regular diet with no nausea/emesis. Checking BG's ACHS. Voiding adequately via urinal, passing gas, no BM overnight. Gets up with minimal SBA. Has call light within reach, able to make needs known, will continue to monitor per POC.

## 2021-10-04 ENCOUNTER — APPOINTMENT (OUTPATIENT)
Dept: PHYSICAL THERAPY | Facility: CLINIC | Age: 56
End: 2021-10-04
Attending: STUDENT IN AN ORGANIZED HEALTH CARE EDUCATION/TRAINING PROGRAM
Payer: COMMERCIAL

## 2021-10-04 LAB
ANION GAP SERPL CALCULATED.3IONS-SCNC: 5 MMOL/L (ref 3–14)
BUN SERPL-MCNC: 17 MG/DL (ref 7–30)
CALCIUM SERPL-MCNC: 8.6 MG/DL (ref 8.5–10.1)
CHLORIDE BLD-SCNC: 107 MMOL/L (ref 94–109)
CO2 SERPL-SCNC: 28 MMOL/L (ref 20–32)
CREAT SERPL-MCNC: 0.69 MG/DL (ref 0.66–1.25)
CRP SERPL-MCNC: 23 MG/L (ref 0–8)
ERYTHROCYTE [DISTWIDTH] IN BLOOD BY AUTOMATED COUNT: 17 % (ref 10–15)
GFR SERPL CREATININE-BSD FRML MDRD: >90 ML/MIN/1.73M2
GLUCOSE BLD-MCNC: 124 MG/DL (ref 70–99)
GLUCOSE BLDC GLUCOMTR-MCNC: 105 MG/DL (ref 70–99)
GLUCOSE BLDC GLUCOMTR-MCNC: 120 MG/DL (ref 70–99)
GLUCOSE BLDC GLUCOMTR-MCNC: 162 MG/DL (ref 70–99)
GLUCOSE BLDC GLUCOMTR-MCNC: 91 MG/DL (ref 70–99)
HCT VFR BLD AUTO: 31.2 % (ref 40–53)
HGB BLD-MCNC: 10 G/DL (ref 13.3–17.7)
MCH RBC QN AUTO: 29.5 PG (ref 26.5–33)
MCHC RBC AUTO-ENTMCNC: 32.1 G/DL (ref 31.5–36.5)
MCV RBC AUTO: 92 FL (ref 78–100)
PLATELET # BLD AUTO: 218 10E3/UL (ref 150–450)
POTASSIUM BLD-SCNC: 3.2 MMOL/L (ref 3.4–5.3)
POTASSIUM BLD-SCNC: 4.3 MMOL/L (ref 3.4–5.3)
RBC # BLD AUTO: 3.39 10E6/UL (ref 4.4–5.9)
SODIUM SERPL-SCNC: 140 MMOL/L (ref 133–144)
WBC # BLD AUTO: 18.5 10E3/UL (ref 4–11)

## 2021-10-04 PROCEDURE — 99232 SBSQ HOSP IP/OBS MODERATE 35: CPT | Performed by: STUDENT IN AN ORGANIZED HEALTH CARE EDUCATION/TRAINING PROGRAM

## 2021-10-04 PROCEDURE — 85027 COMPLETE CBC AUTOMATED: CPT | Performed by: STUDENT IN AN ORGANIZED HEALTH CARE EDUCATION/TRAINING PROGRAM

## 2021-10-04 PROCEDURE — 250N000013 HC RX MED GY IP 250 OP 250 PS 637: Performed by: DENTIST

## 2021-10-04 PROCEDURE — 80048 BASIC METABOLIC PNL TOTAL CA: CPT | Performed by: STUDENT IN AN ORGANIZED HEALTH CARE EDUCATION/TRAINING PROGRAM

## 2021-10-04 PROCEDURE — 97110 THERAPEUTIC EXERCISES: CPT | Mod: GP

## 2021-10-04 PROCEDURE — 86140 C-REACTIVE PROTEIN: CPT | Performed by: STUDENT IN AN ORGANIZED HEALTH CARE EDUCATION/TRAINING PROGRAM

## 2021-10-04 PROCEDURE — 999N000044 HC STATISTIC CVC DRESSING CHANGE

## 2021-10-04 PROCEDURE — 97530 THERAPEUTIC ACTIVITIES: CPT | Mod: GP

## 2021-10-04 PROCEDURE — 250N000011 HC RX IP 250 OP 636: Performed by: INTERNAL MEDICINE

## 2021-10-04 PROCEDURE — 84132 ASSAY OF SERUM POTASSIUM: CPT | Performed by: STUDENT IN AN ORGANIZED HEALTH CARE EDUCATION/TRAINING PROGRAM

## 2021-10-04 PROCEDURE — 999N000157 HC STATISTIC RCP TIME EA 10 MIN

## 2021-10-04 PROCEDURE — 250N000012 HC RX MED GY IP 250 OP 636 PS 637: Performed by: DENTIST

## 2021-10-04 PROCEDURE — 94660 CPAP INITIATION&MGMT: CPT

## 2021-10-04 PROCEDURE — 120N000003 HC R&B IMCU UMMC

## 2021-10-04 PROCEDURE — 250N000011 HC RX IP 250 OP 636: Performed by: PHYSICIAN ASSISTANT

## 2021-10-04 PROCEDURE — 36592 COLLECT BLOOD FROM PICC: CPT | Performed by: STUDENT IN AN ORGANIZED HEALTH CARE EDUCATION/TRAINING PROGRAM

## 2021-10-04 PROCEDURE — 250N000013 HC RX MED GY IP 250 OP 250 PS 637: Performed by: STUDENT IN AN ORGANIZED HEALTH CARE EDUCATION/TRAINING PROGRAM

## 2021-10-04 PROCEDURE — 250N000012 HC RX MED GY IP 250 OP 636 PS 637: Performed by: STUDENT IN AN ORGANIZED HEALTH CARE EDUCATION/TRAINING PROGRAM

## 2021-10-04 RX ORDER — POTASSIUM CHLORIDE 750 MG/1
40 TABLET, EXTENDED RELEASE ORAL ONCE
Status: COMPLETED | OUTPATIENT
Start: 2021-10-04 | End: 2021-10-04

## 2021-10-04 RX ADMIN — PREDNISONE 20 MG: 20 TABLET ORAL at 07:59

## 2021-10-04 RX ADMIN — ACYCLOVIR 400 MG: 200 CAPSULE ORAL at 20:36

## 2021-10-04 RX ADMIN — SODIUM CHLORIDE, PRESERVATIVE FREE 15 ML: 5 INJECTION INTRAVENOUS at 13:16

## 2021-10-04 RX ADMIN — INSULIN ASPART 9 UNITS: 100 INJECTION, SOLUTION INTRAVENOUS; SUBCUTANEOUS at 17:36

## 2021-10-04 RX ADMIN — ACYCLOVIR 400 MG: 200 CAPSULE ORAL at 07:59

## 2021-10-04 RX ADMIN — AMLODIPINE BESYLATE 5 MG: 5 TABLET ORAL at 07:59

## 2021-10-04 RX ADMIN — MYCOPHENOLATE MOFETIL 1500 MG: 500 TABLET, FILM COATED ORAL at 08:00

## 2021-10-04 RX ADMIN — LEVOTHYROXINE SODIUM 25 MCG: 0.03 TABLET ORAL at 07:57

## 2021-10-04 RX ADMIN — POTASSIUM CHLORIDE 40 MEQ: 750 TABLET, EXTENDED RELEASE ORAL at 07:56

## 2021-10-04 RX ADMIN — SULFAMETHOXAZOLE AND TRIMETHOPRIM 1 TABLET: 800; 160 TABLET ORAL at 08:09

## 2021-10-04 RX ADMIN — MYCOPHENOLATE MOFETIL 1500 MG: 500 TABLET, FILM COATED ORAL at 17:33

## 2021-10-04 RX ADMIN — PANTOPRAZOLE SODIUM 40 MG: 40 TABLET, DELAYED RELEASE ORAL at 07:59

## 2021-10-04 RX ADMIN — FLUTICASONE PROPIONATE 1 SPRAY: 50 SPRAY, METERED NASAL at 08:14

## 2021-10-04 RX ADMIN — ESCITALOPRAM OXALATE 5 MG: 5 TABLET, FILM COATED ORAL at 20:36

## 2021-10-04 RX ADMIN — AZITHROMYCIN MONOHYDRATE 250 MG: 250 TABLET ORAL at 08:00

## 2021-10-04 RX ADMIN — HEPARIN SODIUM 5000 UNITS: 5000 INJECTION, SOLUTION INTRAVENOUS; SUBCUTANEOUS at 20:37

## 2021-10-04 RX ADMIN — FLUTICASONE FUROATE 1 PUFF: 100 POWDER RESPIRATORY (INHALATION) at 08:15

## 2021-10-04 RX ADMIN — PANTOPRAZOLE SODIUM 40 MG: 40 TABLET, DELAYED RELEASE ORAL at 16:10

## 2021-10-04 RX ADMIN — HEPARIN SODIUM 5000 UNITS: 5000 INJECTION, SOLUTION INTRAVENOUS; SUBCUTANEOUS at 08:15

## 2021-10-04 RX ADMIN — INSULIN ASPART 6 UNITS: 100 INJECTION, SOLUTION INTRAVENOUS; SUBCUTANEOUS at 09:45

## 2021-10-04 RX ADMIN — Medication 5 MG: at 20:36

## 2021-10-04 RX ADMIN — INSULIN ASPART 9 UNITS: 100 INJECTION, SOLUTION INTRAVENOUS; SUBCUTANEOUS at 13:09

## 2021-10-04 RX ADMIN — ACYCLOVIR 400 MG: 200 CAPSULE ORAL at 13:13

## 2021-10-04 ASSESSMENT — ACTIVITIES OF DAILY LIVING (ADL)
ADLS_ACUITY_SCORE: 12
ADLS_ACUITY_SCORE: 10
ADLS_ACUITY_SCORE: 12

## 2021-10-04 ASSESSMENT — MIFFLIN-ST. JEOR: SCORE: 1420

## 2021-10-04 NOTE — PROGRESS NOTES
Neuro: A&Ox4. Pleasant and follows commands. Calls appropriately   Cardiac: SR. VSS.   Respiratory: Sating >90 on 40% HFNC  GI/: Adequate urine output. BM X1  Diet/appetite: Tolerating regular diet. Eating well.  Activity:  SBA in room.   Pain: Denied pain during shift.   Skin: No new deficits noted.  LDA's: TL PICC, saline locked.     Plan: Continue with POC. Notify primary team with changes.

## 2021-10-04 NOTE — PLAN OF CARE
Pt A&O X4, answers questions/follows commands appropriately. VSS, afebrile, HR sinus rhythm 70's, BP's 110's/60's, O2 sats > 90% on 40% FiO2 via BiPap at night, wears HFNC @ 40% FiO2 on 30LPM while awake. LS clear/diminished, BS+ X4, CMS/neuros intact. Pt had few requests/complaints, denied pain. PICC X3 in R arm, patent & Heparin locked w/ blood return to all lumens. Tolerating regular diet with no nausea/emesis. Checking BG's ACHS w/ sliding scale & CC. Voiding adequately via urinal, passing gas, BM X1 overnight. Gets up ad [place]. Has call light within reach, able to make needs known, will continue to monitor per POC.

## 2021-10-04 NOTE — PROGRESS NOTES
[facility]    Medicine Progress Note - Hospitalist Service, Gold 10       Date of Admission:  9/5/2021    Assessment & Plan        56 year old male with history of NSIP associated with rheumatoid arthritis and traction bronchiectasis who was admitted on 9/5/2021 with acute on chronic hypoxic respiratory failure likely from ILD. Weaning steroids now in preparation for transplant listing. 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    Today:  -Continue MMF, per pulmonary team  -Continue current prednisone dosing  -Continue acyclovir  -Increasing WBC, waxing and waning for past 2 weeks, will monitor in a.m. and pending recheck possible further work-up (vitals stable)    Acute hypoxic respiratory failure  secondary to progressive ILD  Although the patient had tachypnea and leukocytosis on 9/16/2021, sepsis has been effectively ruled out.  2 view chest x-ray showed improvement in the patient's known interstitial lung disease.  -Appreciate pulm consult  -Continue BiPAP at night, HFNC during day  -Continue azithromycin as an anti-inflammatory  -Continue prednisone taper based on recommendations of pulmonology service (currently on 20 mg daily)  -Continue MMF 1500 mg twice daily, per pulmonary      Lung transplant candidacy  -Perimolar abscess excision completed on 9/22/2021 in OR by dentistry. Discussed with dental team 9/24, no indication for antibiotics post tooth extraction   -Although the patient had indeterminate quanteferon gold, this was deemed to be low risk for active or latent tuberculosis based on evaluation of transplant infectious disease.  -The patient received ivermectin at 15 mg X1 dose for treatment of possible stronglydies based on recommendations of transplant ID  -Negative urinary coccidoses antigen   -The patient will need monthly coccidoses antigen for 12 months after transplant  - When transplanted please send, in addition to the lungs, a  mediastinal LN for pathology, fungal cx and AFB smear and cx.   -CT chest w/o contrast with slightly improved groundglass opacities (but still quite prominent)  -The patient will likely need to follow up with ID as an outpatient within 2 months of lung transplant to follow up on the urinary Cocci Ag and the LN and lungs biopsies.   -Azathioprine to be avoided after transplant given low TPMT based on recommendations of transplant pulmonology, however if used, then azathioprine to be used in a low-dose    HSV positive left-sided jaw lesions  Patient endorses having lesions for multiple years and will frequently pick at them.  Physical exam with chronic crusted over lesions throughout lower jaw.  Consulted to dermatology and jaw lesions HSV 2+ with initiation of acyclovir.  Preliminary plan to continue 3 times daily acyclovir for 10 days.  May need longer depending on immunosuppression/resolution of lesions.  -Consult dermatology, appreciate recs  -Continue acyclovir 400 mg 3 times daily x10 days (started 9/30)  -No current need for precautions (not disseminated) at this time     Elevated Lactate - intermittent, improving  Intermittently found to have elevated lactate after triggering sepsis. Usually resolved with minimal/no interventions (I.e. small fluid bolus). Unclear etiology.      Moderate (Non-severe) malnutrition in the context of chronic illness, all are POA  -Nutrition consulted  -Calorie counts, trend weights      Thrombocytopenia, resolved  Leukocytosis -worsening  Anemia  May be due to stress response, illness, medications (steroids)  -Trend CBC      Vitamin D deficiency, POA  -Continue vitamin D replacement     Rheumatoid arthritis  Patient seen by rheumatology this admission. No acute flare of rheumatoid arthritis. SINCERE antibodies negative, flow cytometry negative for CD19 cells, CK normal, aldolase normal. Anti-Irene-1 negative. The patient was treated with leflunomide from 5/21 to 7/21. Rheumatology  considers that leflunomide toxicity might contribute to the patient's ILD.   -Continue cholestyramine TID to increase leflunomide clearance.      Steroid induced diabetes  -Continue insulin glargine 8 units  -Continue insulin NovoLog at medium insulin resistance     Chronic medical problems  Hypothyroidism   Continue 25 mg levothyroxin qday     SALTY  Continue BiPAP at night.      Hypertension  On 5mg amlodipine, BP well controlled today.     Anxiety  Depression  Continue 5mg escitalopram       Diet: Snacks/Supplements Adult: Other; pt may order snacks/supplements PRN; Between Meals  Snacks/Supplements Adult: Ensure Enlive; With Meals  Regular Diet Adult    DVT Prophylaxis: Heparin SQ  Watson Catheter: Not present  Central Lines: None  Code Status: Full Code      Disposition Plan   Expected discharge: 10/08/2021   recommended to prior living arrangement once further lung transplant work-up/lung transplant occurs.     The patient's care was discussed with the Patient and Transplant pulmonary Team.    Rodrigo Mckinnon MD  Hospitalist Service, [address]  Securely message with the Vocera Web Console (learn more here)  Text page via [email or web address] Paging/Directory  Please see sign in/sign out for up to date coverage information    Clinically Significant Risk Factors Present on Admission                ______________________________________________________________________    Interval History   Doing okay.  No overnight events.  He does not endorse scratching his lower jaw lesions.  No fever or chills.  No chest pain.  No abdominal pain.  No longer having GI upset.  No nausea or vomiting.     Data reviewed today: I reviewed all medications, new labs and imaging results over the last 24 hours. I personally reviewed no images or EKG's today.    Physical Exam   Vital Signs: Temp: 98.4  F (36.9  C) Temp src: Oral BP: 119/62 Pulse: 74   Resp: 20 SpO2: 95 % O2 Device: High Flow Nasal  Cannula (HFNC) Oxygen Delivery: 30 LPM  Weight: 149 lbs 11.08 oz  Constitutional: no apparent distress, pleasant and cooperative, sitting back in bed, mask in place  Cardiovascular: RRR, normal S1 and S2, no murmurs, rubs, or gallops noted, no bilateral LE edema   Respiratory: Minor crackles in the posterior lung bases (stable), no wheezing or rhonchi, normal work of breathing   GI: abdomen soft, non tender, non distended, bowel sounds present   Skin: Lower jaw with multiple crusted over lesions chronic changes (stable).  Neuro: alert and oriented, no gross focal deficits noted     Data   Recent Labs   Lab 10/04/21  0813 10/04/21  0432 10/03/21  2150 10/03/21  0936 10/03/21  0540 10/02/21  1007 10/02/21  0528 09/30/21  1405 09/30/21  1059   WBC  --  18.5*  --   --  17.0*  --  16.7*   < > 15.0*   HGB  --  10.0*  --   --  10.8*  --  10.6*   < > 10.9*   MCV  --  92  --   --  92  --  90   < > 93   PLT  --  218  --   --  231  --  247   < > 209   NA  --  140  --   --  140  --  137   < > 136   POTASSIUM  --  3.2*  --   --  3.5  --  3.5   < > 3.6   CHLORIDE  --  107  --   --  106  --  104   < > 101   CO2  --  28  --   --  28  --  28   < > 26   BUN  --  17  --   --  14  --  16   < > 14   CR  --  0.69  --   --  0.73  --  0.68   < > 0.64*   ANIONGAP  --  5  --   --  6  --  5   < > 9   ERIK  --  8.6  --   --  9.0  --  9.4   < > 8.9   GLC 91 124* 81   < > 80   < > 90   < > 193*   ALBUMIN  --   --   --   --   --   --  2.6*  --  2.7*   PROTTOTAL  --   --   --   --   --   --  6.0*  --  6.2*   BILITOTAL  --   --   --   --   --   --  0.3  --  0.3   ALKPHOS  --   --   --   --   --   --  72  --  78   ALT  --   --   --   --   --   --  34  --  34   AST  --   --   --   --   --   --  13  --  12    < > = values in this interval not displayed.     No results found for this or any previous visit (from the past 24 hour(s)).  Medications     - MEDICATION INSTRUCTIONS -       - MEDICATION INSTRUCTIONS -         acyclovir  400 mg Oral TID      amLODIPine  5 mg Oral Daily     azithromycin  250 mg Oral Daily     escitalopram  5 mg Oral QPM     fluticasone  1 puff Inhalation Daily     fluticasone  1 spray Both Nostrils Daily     heparin ANTICOAGULANT  5,000 Units Subcutaneous Q12H     heparin lock flush  5-20 mL Intracatheter Q24H     insulin aspart   Subcutaneous TID AC     insulin aspart  1-7 Units Subcutaneous TID AC     insulin aspart  1-5 Units Subcutaneous At Bedtime     insulin glargine  8 Units Subcutaneous QAM AC     levothyroxine  25 mcg Oral QAM AC     melatonin  5 mg Oral At Bedtime     mycophenolate  1,500 mg Oral BID IS     pantoprazole  40 mg Oral BID AC     predniSONE  20 mg Oral Daily     sodium chloride (PF)  10-40 mL Intracatheter Q8H     sulfamethoxazole-trimethoprim  1 tablet Oral Once per day on Mon Wed Fri     vitamin D2  50,000 Units Oral Q7 Days

## 2021-10-04 NOTE — PROGRESS NOTES
"Weekly status update      Transplant Phase: Active candidate on lung transplant waitlist.   Current LAS: 56.2736 (9/29/21).   Candidate for ECMO as bridge to transplant (if indicated): Yes  Candidate for Intubation as bridge to transplant (if indicated): Yes    Brief Assessment/Chart review:  Neuro: Alert/oriented x4, pleasant.   Respiratory: HFNC: FiO2 40-60% at rest. BIPAP: FiO2 40-45%% while sleeping.  Activity: SBA - mobility limited by current O2 requirements.   PT/OT consult? Yes, following. Pt reports exercising with red and yellow bands at bedside throughout the day as able.       Fluid status:  Kidney function: Cr 0.69 (10/4/21)  Weight admission: 73.3kg --> 67.9kg today (standing scale)  BMI: 25.69 kg/m2 (Goal BMI of 18-30 per lung transplant listing guidelines)      CV:  RHC/Angio: completed 9/8/21  Read:    \"Right sided filling pressures are mildly elevated.    Moderately elevated pulmonary artery hypertension.    Left sided filling pressures are normal.    Normal cardiac output level.    Normal coronary arteries with mild tortuosity\"    ECHO with bubble completed 9/7/21  Read:   \"Interpretation Summary  Global and regional left ventricular function is normal with an EF of 60-65%.  Global right ventricular function is normal.  No significant valvular abnormalities present.  The patent foramen ovale was demonstrated by color Doppler .  Estimated pulmonary artery systolic pressure is 40 mmHg. Pulmonary  hypertension is present.  The inferior vena cava was normal in size with preserved respiratory  Variability. No pericardial effusion is present.\"      Anticoagulation:  - Heparin injections 5000 units q12h (DVT prophylaxis)    *Note: DVT prophylaxis for active candidates on lung transplant waitlist: Please order Heparin instead of Lovenox due to availability of reversal agent in the event the patient is called to the OR for transplant.       Blood transfusions:  Has not received blood products this " admission.   Current Hgb: 10 (10/4/21)  Last PRA on 9/7/21 - UNOS cPRA of 0 (standard lung mfi)  Recommend repeating PRA 3 months on: 12/7/21     *If considering blood transfusion, please contact transplant team/coordinator due to risk of sensitization.         Infection:  WBC increased since 10/1, currently 18.5 today.  Pt reports no new or increased respiratory symptoms, temps <99 per chart review. No increase in O2 requirements per documentation.     - Surgical tooth extraction (concern for periodontal abscess) on 9/22/21 of lower left first molar with alveoloplasty on the lower left quadrant near extraction site (see dental note for further details).     - Evaluated by Dermatology for facial (chin area) lesions. Area swabbed, positive for HSV2. Plan for 10 days of acyclovir.         Anti-infectives:   - Cefepime (9/5-9/15)  - Azithromycin (9/7-current)  - Fluconazole(9/5-9/14)  - Doxycycline (9/5-9/7)  - Bactrim (9/5-current)  - Acyclovir (9/30-current)    ----------------------  - Solumedrol 125 mg through 9/14  9/15: Prednisone 60 mg x3 days  9/18: Prednisone 50mg x3 days  9/21: Prednisone 40mg x3 days  9/24: Prednisone 30mg x3 days  9/27- current: Prednisone 20mg daily      Mood/Behavior/Coping:  Pleasant, denied questions related to transplant at this time.      Transplant education:  Completed 9/8-9/10/21. Hepatitis C paperwork completed 9/13/21, scanned copies placed in paper chart.      Mallory Beasley, RN, BSN, PHN  Inpatient Lung Transplant Coordinator  Solid Organ Transplant  [facility]  Pager: [phone]

## 2021-10-05 ENCOUNTER — APPOINTMENT (OUTPATIENT)
Dept: PHYSICAL THERAPY | Facility: CLINIC | Age: 56
End: 2021-10-05
Attending: STUDENT IN AN ORGANIZED HEALTH CARE EDUCATION/TRAINING PROGRAM
Payer: COMMERCIAL

## 2021-10-05 LAB
ANION GAP SERPL CALCULATED.3IONS-SCNC: 6 MMOL/L (ref 3–14)
BUN SERPL-MCNC: 17 MG/DL (ref 7–30)
CALCIUM SERPL-MCNC: 8.8 MG/DL (ref 8.5–10.1)
CHLORIDE BLD-SCNC: 106 MMOL/L (ref 94–109)
CO2 SERPL-SCNC: 28 MMOL/L (ref 20–32)
CREAT SERPL-MCNC: 0.69 MG/DL (ref 0.66–1.25)
ERYTHROCYTE [DISTWIDTH] IN BLOOD BY AUTOMATED COUNT: 16.9 % (ref 10–15)
GFR SERPL CREATININE-BSD FRML MDRD: >90 ML/MIN/1.73M2
GLUCOSE BLD-MCNC: 125 MG/DL (ref 70–99)
GLUCOSE BLDC GLUCOMTR-MCNC: 117 MG/DL (ref 70–99)
GLUCOSE BLDC GLUCOMTR-MCNC: 134 MG/DL (ref 70–99)
GLUCOSE BLDC GLUCOMTR-MCNC: 135 MG/DL (ref 70–99)
GLUCOSE BLDC GLUCOMTR-MCNC: 93 MG/DL (ref 70–99)
HCT VFR BLD AUTO: 32.2 % (ref 40–53)
HGB BLD-MCNC: 10.4 G/DL (ref 13.3–17.7)
LACTATE SERPL-SCNC: 1.2 MMOL/L (ref 0.7–2)
MCH RBC QN AUTO: 29.8 PG (ref 26.5–33)
MCHC RBC AUTO-ENTMCNC: 32.3 G/DL (ref 31.5–36.5)
MCV RBC AUTO: 92 FL (ref 78–100)
PLATELET # BLD AUTO: 228 10E3/UL (ref 150–450)
POTASSIUM BLD-SCNC: 3.5 MMOL/L (ref 3.4–5.3)
RBC # BLD AUTO: 3.49 10E6/UL (ref 4.4–5.9)
SODIUM SERPL-SCNC: 140 MMOL/L (ref 133–144)
WBC # BLD AUTO: 20.9 10E3/UL (ref 4–11)

## 2021-10-05 PROCEDURE — 250N000012 HC RX MED GY IP 250 OP 636 PS 637: Performed by: STUDENT IN AN ORGANIZED HEALTH CARE EDUCATION/TRAINING PROGRAM

## 2021-10-05 PROCEDURE — 999N000157 HC STATISTIC RCP TIME EA 10 MIN

## 2021-10-05 PROCEDURE — 250N000013 HC RX MED GY IP 250 OP 250 PS 637: Performed by: DENTIST

## 2021-10-05 PROCEDURE — 120N000003 HC R&B IMCU UMMC

## 2021-10-05 PROCEDURE — 36592 COLLECT BLOOD FROM PICC: CPT | Performed by: STUDENT IN AN ORGANIZED HEALTH CARE EDUCATION/TRAINING PROGRAM

## 2021-10-05 PROCEDURE — 250N000011 HC RX IP 250 OP 636: Performed by: INTERNAL MEDICINE

## 2021-10-05 PROCEDURE — 94660 CPAP INITIATION&MGMT: CPT

## 2021-10-05 PROCEDURE — 83605 ASSAY OF LACTIC ACID: CPT | Performed by: STUDENT IN AN ORGANIZED HEALTH CARE EDUCATION/TRAINING PROGRAM

## 2021-10-05 PROCEDURE — 99232 SBSQ HOSP IP/OBS MODERATE 35: CPT | Performed by: INTERNAL MEDICINE

## 2021-10-05 PROCEDURE — 250N000012 HC RX MED GY IP 250 OP 636 PS 637: Performed by: DENTIST

## 2021-10-05 PROCEDURE — 85027 COMPLETE CBC AUTOMATED: CPT | Performed by: STUDENT IN AN ORGANIZED HEALTH CARE EDUCATION/TRAINING PROGRAM

## 2021-10-05 PROCEDURE — 999N000215 HC STATISTIC HFNC ADULT NON-CPAP

## 2021-10-05 PROCEDURE — 250N000011 HC RX IP 250 OP 636: Performed by: PHYSICIAN ASSISTANT

## 2021-10-05 PROCEDURE — 80048 BASIC METABOLIC PNL TOTAL CA: CPT | Performed by: STUDENT IN AN ORGANIZED HEALTH CARE EDUCATION/TRAINING PROGRAM

## 2021-10-05 PROCEDURE — 250N000013 HC RX MED GY IP 250 OP 250 PS 637: Performed by: STUDENT IN AN ORGANIZED HEALTH CARE EDUCATION/TRAINING PROGRAM

## 2021-10-05 PROCEDURE — 97110 THERAPEUTIC EXERCISES: CPT | Mod: GP

## 2021-10-05 RX ORDER — NALOXONE HYDROCHLORIDE 0.4 MG/ML
0.2 INJECTION, SOLUTION INTRAMUSCULAR; INTRAVENOUS; SUBCUTANEOUS
Status: DISCONTINUED | OUTPATIENT
Start: 2021-10-05 | End: 2021-10-16

## 2021-10-05 RX ORDER — NALOXONE HYDROCHLORIDE 0.4 MG/ML
0.4 INJECTION, SOLUTION INTRAMUSCULAR; INTRAVENOUS; SUBCUTANEOUS
Status: DISCONTINUED | OUTPATIENT
Start: 2021-10-05 | End: 2021-10-16

## 2021-10-05 RX ORDER — FUROSEMIDE 10 MG/ML
10 INJECTION INTRAMUSCULAR; INTRAVENOUS ONCE
Status: COMPLETED | OUTPATIENT
Start: 2021-10-05 | End: 2021-10-05

## 2021-10-05 RX ADMIN — AZITHROMYCIN MONOHYDRATE 250 MG: 250 TABLET ORAL at 08:49

## 2021-10-05 RX ADMIN — FLUTICASONE FUROATE 1 PUFF: 100 POWDER RESPIRATORY (INHALATION) at 08:49

## 2021-10-05 RX ADMIN — ESCITALOPRAM OXALATE 5 MG: 5 TABLET, FILM COATED ORAL at 19:44

## 2021-10-05 RX ADMIN — MYCOPHENOLATE MOFETIL 1500 MG: 500 TABLET, FILM COATED ORAL at 18:08

## 2021-10-05 RX ADMIN — PANTOPRAZOLE SODIUM 40 MG: 40 TABLET, DELAYED RELEASE ORAL at 08:48

## 2021-10-05 RX ADMIN — INSULIN ASPART 5 UNITS: 100 INJECTION, SOLUTION INTRAVENOUS; SUBCUTANEOUS at 08:53

## 2021-10-05 RX ADMIN — Medication 5 MG: at 19:44

## 2021-10-05 RX ADMIN — ACYCLOVIR 400 MG: 200 CAPSULE ORAL at 19:44

## 2021-10-05 RX ADMIN — PANTOPRAZOLE SODIUM 40 MG: 40 TABLET, DELAYED RELEASE ORAL at 16:57

## 2021-10-05 RX ADMIN — MYCOPHENOLATE MOFETIL 1500 MG: 500 TABLET, FILM COATED ORAL at 08:48

## 2021-10-05 RX ADMIN — AMLODIPINE BESYLATE 5 MG: 5 TABLET ORAL at 08:49

## 2021-10-05 RX ADMIN — ACYCLOVIR 400 MG: 200 CAPSULE ORAL at 08:49

## 2021-10-05 RX ADMIN — FLUTICASONE PROPIONATE 1 SPRAY: 50 SPRAY, METERED NASAL at 08:49

## 2021-10-05 RX ADMIN — SODIUM CHLORIDE, PRESERVATIVE FREE 15 ML: 5 INJECTION INTRAVENOUS at 13:58

## 2021-10-05 RX ADMIN — LEVOTHYROXINE SODIUM 25 MCG: 0.03 TABLET ORAL at 08:49

## 2021-10-05 RX ADMIN — ACYCLOVIR 400 MG: 200 CAPSULE ORAL at 13:58

## 2021-10-05 RX ADMIN — PREDNISONE 20 MG: 20 TABLET ORAL at 08:49

## 2021-10-05 RX ADMIN — HEPARIN SODIUM 5000 UNITS: 5000 INJECTION, SOLUTION INTRAVENOUS; SUBCUTANEOUS at 19:44

## 2021-10-05 RX ADMIN — INSULIN ASPART 6 UNITS: 100 INJECTION, SOLUTION INTRAVENOUS; SUBCUTANEOUS at 18:08

## 2021-10-05 RX ADMIN — HEPARIN SODIUM 5000 UNITS: 5000 INJECTION, SOLUTION INTRAVENOUS; SUBCUTANEOUS at 08:49

## 2021-10-05 RX ADMIN — FUROSEMIDE 10 MG: 10 INJECTION, SOLUTION INTRAVENOUS at 13:58

## 2021-10-05 ASSESSMENT — ACTIVITIES OF DAILY LIVING (ADL)
ADLS_ACUITY_SCORE: 10
ADLS_ACUITY_SCORE: 10
ADLS_ACUITY_SCORE: 12
ADLS_ACUITY_SCORE: 10

## 2021-10-05 ASSESSMENT — MIFFLIN-ST. JEOR: SCORE: 1439

## 2021-10-05 NOTE — PROGRESS NOTES
CLINICAL NUTRITION SERVICES - REASSESSMENT NOTE     Nutrition Prescription    RECOMMENDATIONS FOR MDs/PROVIDERS TO ORDER:  Encourage oral intake- minimize interruptions to diet     Malnutrition Status:    Patient does not meet two criteria at this time but is at risk for malnutrition     Recommendations already ordered by Registered Dietitian (RD):  Continue ensure enlive PRN     Future/Additional Recommendations:  Continue to monitor appetite, oral intake, use of supplements   Need for additional transplant nutrition education      If enteral nutrition is needed:   Pre transplant: Osmolite 1.5 Conner @ goal of  50ml/hr (1200 ml/day) + 1 packet prosource  will provide: 1840 kcals (28 kcal/kg), 86g PRO (1.3) , 914 ml free H20, 244 g CHO, and 0 g fiber daily.     Post transplant: Osmolite 1.5 Conner @ goal of  60ml/hr (1440ml/day) + 1 packet prosurce  will provide: 2200 kcals (34 kca/kg), 101 g PRO (1.5 g/kg), 1097 ml free H20, 293 g CHO, and 0 g fiber daily.     EVALUATION OF THE PROGRESS TOWARD GOALS   Diet: Regular + ensure enlive PRN     Intake: 100%   9/27       Total Kcals: 3347     Total Protein: 117g  9/25       Total Kcals: 1420     Total Protein: 41g     NEW FINDINGS   Woody denied any nutrition related concerns. Reported overall appetite is fine, eating well and drinking ensure as needed.     Weight Trends:   10/05/21 0330 69.8 kg (153 lb 14.1 oz)   09/29/21 0437 69.1 kg (152 lb 6.4 oz)   09/23/21 0322 67.3 kg (148 lb 5.9 oz)   09/19/21 0000 65.5 kg (144 lb 6.4 oz)   09/13/21 0020 64 kg (141 lb 1.5 oz)   09/10/21 0400 68.1 kg (150 lb 1.6 oz)   09/05/21 1752 73.3 kg (161 lb 8 oz)   Will maintain dosing weight of 64 kg     GI: Last bowel movement, 10/4, 2 stools documented.   Skin: Reviewed   Labs:  Glucose 125 (H)  Medications: Novolog, Lantus, Levothyroxine, Mycophenolate, Protonix, Prednisone      MALNUTRITION  % Intake: No decreased intake noted  % Weight Loss: None noted (patient had previous significant  loss)  Subcutaneous Fat Loss: None observed  Muscle Loss: Temporal:  mild, Upper arm (bicep, tricep):  mild and Dorsal hand:  moderate  Fluid Accumulation/Edema: None noted  Malnutrition Diagnosis: Patient does not meet two of the established criteria necessary for diagnosing malnutrition but is at risk for malnutrition    Previous Goals   Patient to consume % of nutritionally adequate meal trays TID, or the equivalent with supplements/snacks.  Evaluation: Met    Previous Nutrition Diagnosis  Predicted inadequate nutrient intake (energy/protein) related to prolonged hospitalization as evidenced by day 22 with limited menu   Evaluation: No change    CURRENT NUTRITION DIAGNOSIS  Predicted inadequate nutrient intake (energy/protein) related to prolonged hospitalization as evidenced by day 30 of hospitalization     INTERVENTIONS  Implementation  Collaboration with other providers- 6B rounds   Medical food supplement therapy    Goals  Patient to consume % of nutritionally adequate meal trays TID, or the equivalent with supplements/snacks.    Monitoring/Evaluation  Progress toward goals will be monitored and evaluated per protocol.    Janny Khan RD, LD  6B pager: [phone]

## 2021-10-05 NOTE — PROGRESS NOTES
[facility]    Medicine Progress Note - Hospitalist Service, Gold 10       Date of Admission:  9/5/2021    Assessment & Plan     Edson Thornton is a 56 year old male with history of NSIP associated with rheumatoid arthritis and traction bronchiectasis who was admitted on 9/5/2021 with acute on chronic hypoxic respiratory failure likely from ILD. Weaning steroids now in preparation for transplant listing. 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    Today:  -Low dose diuresis  -Watching CRP and CBC q48h  -Decreased glargine due to tight glucose control    Acute on chronic hypoxic respiratory failure  secondary to progressive ILD  Although the patient had tachypnea and leukocytosis on 9/16/2021, sepsis has been effectively ruled out.  2 view chest x-ray showed improvement in the patient's known interstitial lung disease.  -Appreciate pulm consult  -Continue BiPAP at night, HFNC during day  -Continue azithromycin as an anti-inflammatory  -Continue prednisone taper based on recommendations of pulmonology service (currently on 20 mg daily)  -Continue MMF 1500 mg twice daily, per pulmonary  -IV diuresis 10/5 due to rising weight      Lung transplant candidacy  -Perimolar abscess excision completed on 9/22/2021 in OR by dentistry. Discussed with dental team 9/24, no indication for antibiotics post tooth extraction   -Although the patient had indeterminate quanteferon gold, this was deemed to be low risk for active or latent tuberculosis based on evaluation of transplant infectious disease.  -The patient received ivermectin at 15 mg X1 dose for treatment of possible stronglydies based on recommendations of transplant ID  -Negative urinary coccidoses antigen   -The patient will need monthly coccidoses antigen for 12 months after transplant  - When transplanted please send, in addition to the lungs, a mediastinal LN for pathology, fungal cx and AFB smear and  cx.   -CT chest w/o contrast with slightly improved groundglass opacities (but still quite prominent)  -The patient will likely need to follow up with ID as an outpatient within 2 months of lung transplant to follow up on the urinary Cocci Ag and the LN and lungs biopsies.   -Azathioprine to be avoided after transplant given low TPMT based on recommendations of transplant pulmonology, however if used, then azathioprine to be used in a low-dose    HSV positive left-sided jaw lesions  Patient endorses having lesions for multiple years and will frequently pick at them.  Physical exam with chronic crusted over lesions throughout lower jaw.  Consulted to dermatology and jaw lesions HSV 2+ with initiation of acyclovir.  Preliminary plan to continue 3 times daily acyclovir for 10 days.  May need longer depending on immunosuppression/resolution of lesions.  -Consult dermatology, appreciate recs  -Continue acyclovir 400 mg 3 times daily x10 days (started 9/30)  -No current need for precautions (not disseminated) at this time     Elevated Lactate - intermittent, resolved  Intermittently found to have elevated lactate after triggering sepsis. Usually resolved with minimal/no interventions (I.e. small fluid bolus). Unclear etiology.      Moderate (Non-severe) malnutrition in the context of chronic illness, all are POA  Malnutrition:    - Level of malnutrition: Non-Severe   - Based on: weight loss, mild (or greater) muscle loss  -Nutrition consulted  -Calorie counts, trend weights      Thrombocytopenia, resolved  Anemia- likely anemia of chronic disease. Baseline around 10-- where he has been while hospitalized.    Leukocytosis -worsening. No other signs of acute infection. Continuing to monitor but suspect due to stress response, steroids  -Trend CBC, CRP q48h     Vitamin D deficiency, POA  -Continue vitamin D replacement     Rheumatoid arthritis  Patient seen by rheumatology this admission. No acute flare of rheumatoid  arthritis. SINCERE antibodies negative, flow cytometry negative for CD19 cells, CK normal, aldolase normal. Anti-Irene-1 negative. The patient was treated with leflunomide from 5/21 to 7/21. Rheumatology considers that leflunomide toxicity might contribute to the patient's ILD.   -Continue cholestyramine TID to increase leflunomide clearance.      Steroid induced diabetes  -Continue insulin - decreased glargine to 6 units 10/5 due to borderline hypoglycemia  -Continue insulin NovoLog at medium insulin resistance     Chronic medical problems  Hypothyroidism   Continue 25 mg levothyroxin qday     SALTY  Continue BiPAP at night.      Hypertension  On 5mg amlodipine, BP well controlled .     Anxiety  Depression  Continue 5mg escitalopram       Diet: Snacks/Supplements Adult: Other; pt may order snacks/supplements PRN; Between Meals  Snacks/Supplements Adult: Ensure Enlive; With Meals  Regular Diet Adult    DVT Prophylaxis: Heparin SQ  Watson Catheter: Not present  Central Lines: None  Code Status: Full Code      Disposition Plan   Expected discharge: pending- unlikely to be sooner than 10/08/2021 as he is awaiting lung transplant     The patient's care was discussed with the Patient and Pulmonary Consultant.    Shauna Juarez MD  Hospitalist Service, [address]  Securely message with the Vocera Web Console (learn more here)  Text page via [employer] Paging/Directory  Please see sign in/sign out for up to date coverage information    Clinically Significant Risk Factors Present on Admission                ______________________________________________________________________    Interval History   No complaints this morning. Eating/drinking well. Voiding. No abdominal pain. Breathing feels unchanged. Still on high flow. No questions/concerns. No new issues. Nursing notes reviewed. No acute events overnight.    Data reviewed today: I reviewed all medications, new labs and imaging  results over the last 24 hours. I personally reviewed no images or EKG's today.    Physical Exam   Vital Signs: Temp: 96.9  F (36.1  C) Temp src: Axillary BP: 124/71 Pulse: 70   Resp: 18 SpO2: 96 % O2 Device: High Flow Nasal Cannula (HFNC) Oxygen Delivery: 30 LPM  Weight: 153 lbs 14.1 oz  Constitutional: Awake, alert and in no distress. Sitting comfortably in bed.  Head: Normocephalic. Atraumatic.   EENT: HFNC in place  Cardiovascular: Regular rate and rhythm. No murmurs, clicks, gallops or rubs. No edema  Respiratory: Mildly increased respiratory rate, increased work of breathing. Clear to auscultation without crackles, rhonchi  Gastrointestinal: Abdomen soft, non-tender.   Musculoskeletal: extremities normal- no gross deformities noted and normal muscle tone  Skin: no suspicious lesions, rashes, jaundice, or cyanosis     Data   Recent Labs   Lab 10/05/21  1405 10/05/21  0751 10/05/21  0318 10/04/21  1307 10/04/21  1144 10/04/21  0813 10/04/21  0432 10/03/21  0936 10/03/21  0540 10/02/21  1007 10/02/21  0528 09/30/21  1405 09/30/21  1059   WBC  --   --  20.9*  --   --   --  18.5*  --  17.0*   < > 16.7*   < > 15.0*   HGB  --   --  10.4*  --   --   --  10.0*  --  10.8*   < > 10.6*   < > 10.9*   MCV  --   --  92  --   --   --  92  --  92   < > 90   < > 93   PLT  --   --  228  --   --   --  218  --  231   < > 247   < > 209   NA  --   --  140  --   --   --  140  --  140   < > 137   < > 136   POTASSIUM  --   --  3.5  --  4.3  --  3.2*   < > 3.5   < > 3.5   < > 3.6   CHLORIDE  --   --  106  --   --   --  107  --  106   < > 104   < > 101   CO2  --   --  28  --   --   --  28  --  28   < > 28   < > 26   BUN  --   --  17  --   --   --  17  --  14   < > 16   < > 14   CR  --   --  0.69  --   --   --  0.69  --  0.73   < > 0.68   < > 0.64*   ANIONGAP  --   --  6  --   --   --  5  --  6   < > 5   < > 9   ERIK  --   --  8.8  --   --   --  8.6  --  9.0   < > 9.4   < > 8.9   * 93 125*   < >  --    < > 124*   < > 80   < > 90   <  > 193*   ALBUMIN  --   --   --   --   --   --   --   --   --   --  2.6*  --  2.7*   PROTTOTAL  --   --   --   --   --   --   --   --   --   --  6.0*  --  6.2*   BILITOTAL  --   --   --   --   --   --   --   --   --   --  0.3  --  0.3   ALKPHOS  --   --   --   --   --   --   --   --   --   --  72  --  78   ALT  --   --   --   --   --   --   --   --   --   --  34  --  34   AST  --   --   --   --   --   --   --   --   --   --  13  --  12    < > = values in this interval not displayed.     Medications     - MEDICATION INSTRUCTIONS -       - MEDICATION INSTRUCTIONS -         acyclovir  400 mg Oral TID     amLODIPine  5 mg Oral Daily     azithromycin  250 mg Oral Daily     escitalopram  5 mg Oral QPM     fluticasone  1 puff Inhalation Daily     fluticasone  1 spray Both Nostrils Daily     heparin ANTICOAGULANT  5,000 Units Subcutaneous Q12H     heparin lock flush  5-20 mL Intracatheter Q24H     insulin aspart   Subcutaneous TID AC     insulin aspart  1-7 Units Subcutaneous TID AC     insulin aspart  1-5 Units Subcutaneous At Bedtime     [START ON 10/6/2021] insulin glargine  6 Units Subcutaneous QAM AC     levothyroxine  25 mcg Oral QAM AC     melatonin  5 mg Oral At Bedtime     mycophenolate  1,500 mg Oral BID IS     pantoprazole  40 mg Oral BID AC     predniSONE  20 mg Oral Daily     sodium chloride (PF)  10-40 mL Intracatheter Q8H     sulfamethoxazole-trimethoprim  1 tablet Oral Once per day on Mon Wed Fri     vitamin D2  50,000 Units Oral Q7 Days

## 2021-10-05 NOTE — PLAN OF CARE
Neuro: A&Ox4. Flat affect, frustrated at times but always cooperative.  Cardiac: Afebrile. SR 70s. VSS.   Respiratory: Sating >92% on HFNC 40-45% 30L. Ambulated in segura (2 laps around 6B and 1 lap around 6A) with RN on 25L oxiplus mask and maintained O2 sats >92% the entire time. Per PT, pt worked with them on 15L oxiplus and maintained O2 sats. MYERS noted.  GI/: Adequate urine output. BM X1.  Diet/appetite: Tolerating regular diet. Fair appetite, did not eat lunch though.   Activity:  Up SBA.  Pain: Denies.   Skin: No new deficits noted.  LDA's: R PICC heparin locked.     Plan: Continue to encourage OOB activity and wean O2 as able. Continue with POC. Notify primary team with changes.

## 2021-10-05 NOTE — PLAN OF CARE
Neuro: A&Ox4. Wears glasses.   Cardiac: SR/ST. VSS. Triggered sepsis BPA due to white cell count - lactic result 1.2. HR up to 130s when getting to the commode - recovers quickly to low 100s.   Respiratory: Sating >90% on 40% BiPAP overnight. HFNC during the day 30L 40%.   GI/: Adequate urine output via urinal. BM X 2 via commode. Soft/formed.   Diet/appetite: Tolerating regular diet. Eating well.  Activity:  Assist of 1/SBA, up to chair. Ambulated in the halls with therapy today and did well (HR up to 110s, sats maintained >92% throughout).   Pain: Denies.   Skin: No new deficits noted. Resolved groin wound LDA as it has healed and patient states he feels no discomfort at that area. Small lesions from shaving face remain - healing slowly per patient.   LDA's: Right PICC (triple lumen) - caps changed today.     Plan: Continue with POC. Notify primary team with changes.

## 2021-10-06 ENCOUNTER — APPOINTMENT (OUTPATIENT)
Dept: PHYSICAL THERAPY | Facility: CLINIC | Age: 56
End: 2021-10-06
Attending: STUDENT IN AN ORGANIZED HEALTH CARE EDUCATION/TRAINING PROGRAM
Payer: COMMERCIAL

## 2021-10-06 LAB
ANION GAP SERPL CALCULATED.3IONS-SCNC: 2 MMOL/L (ref 3–14)
BASOPHILS # BLD MANUAL: 0.2 10E3/UL (ref 0–0.2)
BASOPHILS NFR BLD MANUAL: 1 %
BUN SERPL-MCNC: 17 MG/DL (ref 7–30)
CALCIUM SERPL-MCNC: 9.6 MG/DL (ref 8.5–10.1)
CHLORIDE BLD-SCNC: 104 MMOL/L (ref 94–109)
CO2 SERPL-SCNC: 32 MMOL/L (ref 20–32)
CREAT SERPL-MCNC: 0.66 MG/DL (ref 0.66–1.25)
CRP SERPL-MCNC: 35 MG/L (ref 0–8)
EOSINOPHIL # BLD MANUAL: 0 10E3/UL (ref 0–0.7)
EOSINOPHIL NFR BLD MANUAL: 0 %
ERYTHROCYTE [DISTWIDTH] IN BLOOD BY AUTOMATED COUNT: 17.1 % (ref 10–15)
GFR SERPL CREATININE-BSD FRML MDRD: >90 ML/MIN/1.73M2
GLUCOSE BLD-MCNC: 85 MG/DL (ref 70–99)
GLUCOSE BLDC GLUCOMTR-MCNC: 131 MG/DL (ref 70–99)
GLUCOSE BLDC GLUCOMTR-MCNC: 153 MG/DL (ref 70–99)
GLUCOSE BLDC GLUCOMTR-MCNC: 153 MG/DL (ref 70–99)
GLUCOSE BLDC GLUCOMTR-MCNC: 91 MG/DL (ref 70–99)
HCT VFR BLD AUTO: 34.7 % (ref 40–53)
HGB BLD-MCNC: 11.5 G/DL (ref 13.3–17.7)
LYMPHOCYTES # BLD MANUAL: 2.2 10E3/UL (ref 0.8–5.3)
LYMPHOCYTES NFR BLD MANUAL: 10 %
MCH RBC QN AUTO: 30.3 PG (ref 26.5–33)
MCHC RBC AUTO-ENTMCNC: 33.1 G/DL (ref 31.5–36.5)
MCV RBC AUTO: 91 FL (ref 78–100)
METAMYELOCYTES # BLD MANUAL: 0.4 10E3/UL
METAMYELOCYTES NFR BLD MANUAL: 2 %
MONOCYTES # BLD MANUAL: 1.1 10E3/UL (ref 0–1.3)
MONOCYTES NFR BLD MANUAL: 5 %
MYELOCYTES # BLD MANUAL: 0.9 10E3/UL
MYELOCYTES NFR BLD MANUAL: 4 %
NEUTROPHILS # BLD MANUAL: 17.2 10E3/UL (ref 1.6–8.3)
NEUTROPHILS NFR BLD MANUAL: 78 %
PLAT MORPH BLD: ABNORMAL
PLATELET # BLD AUTO: 217 10E3/UL (ref 150–450)
POTASSIUM BLD-SCNC: 3.4 MMOL/L (ref 3.4–5.3)
RBC # BLD AUTO: 3.8 10E6/UL (ref 4.4–5.9)
RBC MORPH BLD: ABNORMAL
SODIUM SERPL-SCNC: 138 MMOL/L (ref 133–144)
WBC # BLD AUTO: 22 10E3/UL (ref 4–11)

## 2021-10-06 PROCEDURE — 120N000003 HC R&B IMCU UMMC

## 2021-10-06 PROCEDURE — 36592 COLLECT BLOOD FROM PICC: CPT | Performed by: INTERNAL MEDICINE

## 2021-10-06 PROCEDURE — 999N000157 HC STATISTIC RCP TIME EA 10 MIN

## 2021-10-06 PROCEDURE — 250N000013 HC RX MED GY IP 250 OP 250 PS 637: Performed by: DENTIST

## 2021-10-06 PROCEDURE — 250N000012 HC RX MED GY IP 250 OP 636 PS 637: Performed by: STUDENT IN AN ORGANIZED HEALTH CARE EDUCATION/TRAINING PROGRAM

## 2021-10-06 PROCEDURE — 250N000012 HC RX MED GY IP 250 OP 636 PS 637: Performed by: DENTIST

## 2021-10-06 PROCEDURE — 86140 C-REACTIVE PROTEIN: CPT | Performed by: INTERNAL MEDICINE

## 2021-10-06 PROCEDURE — 250N000011 HC RX IP 250 OP 636: Performed by: PHYSICIAN ASSISTANT

## 2021-10-06 PROCEDURE — 80048 BASIC METABOLIC PNL TOTAL CA: CPT | Performed by: INTERNAL MEDICINE

## 2021-10-06 PROCEDURE — 85027 COMPLETE CBC AUTOMATED: CPT | Performed by: INTERNAL MEDICINE

## 2021-10-06 PROCEDURE — 99232 SBSQ HOSP IP/OBS MODERATE 35: CPT | Performed by: INTERNAL MEDICINE

## 2021-10-06 PROCEDURE — 250N000013 HC RX MED GY IP 250 OP 250 PS 637: Performed by: STUDENT IN AN ORGANIZED HEALTH CARE EDUCATION/TRAINING PROGRAM

## 2021-10-06 PROCEDURE — 999N000043 HC STATISTIC CTO2 CONT OXYGEN TECH TIME EA 90 MIN

## 2021-10-06 PROCEDURE — 94660 CPAP INITIATION&MGMT: CPT

## 2021-10-06 PROCEDURE — 250N000011 HC RX IP 250 OP 636: Performed by: INTERNAL MEDICINE

## 2021-10-06 PROCEDURE — 97110 THERAPEUTIC EXERCISES: CPT | Mod: GP

## 2021-10-06 RX ADMIN — PANTOPRAZOLE SODIUM 40 MG: 40 TABLET, DELAYED RELEASE ORAL at 16:07

## 2021-10-06 RX ADMIN — SODIUM CHLORIDE, PRESERVATIVE FREE 15 ML: 5 INJECTION INTRAVENOUS at 14:48

## 2021-10-06 RX ADMIN — ESCITALOPRAM OXALATE 5 MG: 5 TABLET, FILM COATED ORAL at 19:27

## 2021-10-06 RX ADMIN — INSULIN ASPART 3 UNITS: 100 INJECTION, SOLUTION INTRAVENOUS; SUBCUTANEOUS at 10:17

## 2021-10-06 RX ADMIN — AZITHROMYCIN MONOHYDRATE 250 MG: 250 TABLET ORAL at 08:41

## 2021-10-06 RX ADMIN — FLUTICASONE PROPIONATE 1 SPRAY: 50 SPRAY, METERED NASAL at 08:45

## 2021-10-06 RX ADMIN — PREDNISONE 20 MG: 20 TABLET ORAL at 08:41

## 2021-10-06 RX ADMIN — ACYCLOVIR 400 MG: 200 CAPSULE ORAL at 14:47

## 2021-10-06 RX ADMIN — INSULIN GLARGINE 6 UNITS: 100 INJECTION, SOLUTION SUBCUTANEOUS at 10:16

## 2021-10-06 RX ADMIN — INSULIN ASPART 10 UNITS: 100 INJECTION, SOLUTION INTRAVENOUS; SUBCUTANEOUS at 19:25

## 2021-10-06 RX ADMIN — INSULIN ASPART 8 UNITS: 100 INJECTION, SOLUTION INTRAVENOUS; SUBCUTANEOUS at 16:08

## 2021-10-06 RX ADMIN — PANTOPRAZOLE SODIUM 40 MG: 40 TABLET, DELAYED RELEASE ORAL at 08:42

## 2021-10-06 RX ADMIN — MYCOPHENOLATE MOFETIL 1500 MG: 500 TABLET, FILM COATED ORAL at 08:42

## 2021-10-06 RX ADMIN — AMLODIPINE BESYLATE 5 MG: 5 TABLET ORAL at 08:42

## 2021-10-06 RX ADMIN — ACYCLOVIR 400 MG: 200 CAPSULE ORAL at 08:40

## 2021-10-06 RX ADMIN — SULFAMETHOXAZOLE AND TRIMETHOPRIM 1 TABLET: 800; 160 TABLET ORAL at 08:47

## 2021-10-06 RX ADMIN — MYCOPHENOLATE MOFETIL 1500 MG: 500 TABLET, FILM COATED ORAL at 18:05

## 2021-10-06 RX ADMIN — Medication 5 MG: at 19:27

## 2021-10-06 RX ADMIN — HEPARIN SODIUM 5000 UNITS: 5000 INJECTION, SOLUTION INTRAVENOUS; SUBCUTANEOUS at 19:27

## 2021-10-06 RX ADMIN — ACYCLOVIR 400 MG: 200 CAPSULE ORAL at 19:31

## 2021-10-06 RX ADMIN — LEVOTHYROXINE SODIUM 25 MCG: 0.03 TABLET ORAL at 08:42

## 2021-10-06 RX ADMIN — HEPARIN SODIUM 5000 UNITS: 5000 INJECTION, SOLUTION INTRAVENOUS; SUBCUTANEOUS at 08:48

## 2021-10-06 RX ADMIN — ERGOCALCIFEROL 50000 UNITS: 1.25 CAPSULE, LIQUID FILLED ORAL at 14:47

## 2021-10-06 RX ADMIN — FLUTICASONE FUROATE 1 PUFF: 100 POWDER RESPIRATORY (INHALATION) at 08:45

## 2021-10-06 ASSESSMENT — ACTIVITIES OF DAILY LIVING (ADL)
ADLS_ACUITY_SCORE: 10
ADLS_ACUITY_SCORE: 10
ADLS_ACUITY_SCORE: 8
ADLS_ACUITY_SCORE: 10

## 2021-10-06 ASSESSMENT — MIFFLIN-ST. JEOR: SCORE: 1432

## 2021-10-06 NOTE — PROGRESS NOTES
[facility]    Medicine Progress Note - Hospitalist Service, Gold 10       Date of Admission:  9/5/2021    Assessment & Plan          Edson Thornton is a 56 year old male with history of NSIP associated with rheumatoid arthritis and traction bronchiectasis who was admitted on 9/5/2021 with acute on chronic hypoxic respiratory failure likely from ILD. Weaning steroids now in preparation for transplant listing. 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    Today:  -Talk to pulm re weaning steroids further or whether we'll keep at 20 mg until transplant    Acute on chronic hypoxic respiratory failure  secondary to progressive ILD  Although the patient had tachypnea and leukocytosis on 9/16/2021, sepsis has been effectively ruled out.  2 view chest x-ray showed improvement in the patient's known interstitial lung disease.  -Appreciate pulm consult  -Continue BiPAP at night, HFNC during day  -Continue azithromycin as an anti-inflammatory  -Continue prednisone taper based on recommendations of pulmonology service (currently on 20 mg daily)  -Continue MMF 1500 mg twice daily, per pulmonary  -IV diuresis 10/5 due to rising weight--no further diuresis 10/6      Lung transplant candidacy  -Perimolar abscess excision completed on 9/22/2021 in OR by dentistry. Discussed with dental team 9/24, no indication for antibiotics post tooth extraction   -Although the patient had indeterminate quanteferon gold, this was deemed to be low risk for active or latent tuberculosis based on evaluation of transplant infectious disease.  -The patient received ivermectin at 15 mg X1 dose for treatment of possible stronglydies based on recommendations of transplant ID  -Negative urinary coccidoses antigen   -The patient will need monthly coccidoses antigen for 12 months after transplant  - When transplanted please send, in addition to the lungs, a mediastinal LN for pathology, fungal cx  and AFB smear and cx.   -CT chest w/o contrast with slightly improved groundglass opacities (but still quite prominent)  -The patient will likely need to follow up with ID as an outpatient within 2 months of lung transplant to follow up on the urinary Cocci Ag and the LN and lungs biopsies.   -Azathioprine to be avoided after transplant given low TPMT based on recommendations of transplant pulmonology, however if used, then azathioprine to be used in a low-dose    HSV positive left-sided jaw lesions  Patient endorses having lesions for multiple years and will frequently pick at them.  Physical exam with chronic crusted over lesions throughout lower jaw.  Consulted to dermatology and jaw lesions HSV 2+ with initiation of acyclovir.  Preliminary plan to continue 3 times daily acyclovir for 10 days.  May need longer depending on immunosuppression/resolution of lesions.  -Consult dermatology, appreciate recs  -Continue acyclovir 400 mg 3 times daily x10 days (started 9/30)  -No current need for precautions (not disseminated) at this time     Elevated Lactate - intermittent, resolved  Intermittently found to have elevated lactate after triggering sepsis. Usually resolved with minimal/no interventions (I.e. small fluid bolus). Unclear etiology.      Moderate (Non-severe) malnutrition in the context of chronic illness, all are POA  Malnutrition:    - Level of malnutrition: Non-Severe   - Based on: weight loss, mild (or greater) muscle loss  -Nutrition consulted  -Calorie counts, trend weights      Thrombocytopenia, resolved  Anemia- likely anemia of chronic disease. Baseline around 10-- where he has been while hospitalized.    Leukocytosis -worsening. No other signs of acute infection. Continuing to monitor but suspect due to stress response, steroids.  -Trend CBC, CRP q48h     Vitamin D deficiency, POA  -Continue vitamin D replacement     Rheumatoid arthritis  Patient seen by rheumatology this admission. No acute flare of  rheumatoid arthritis. SINCERE antibodies negative, flow cytometry negative for CD19 cells, CK normal, aldolase normal. Anti-Irene-1 negative. The patient was treated with leflunomide from 5/21 to 7/21. Rheumatology considers that leflunomide toxicity might contribute to the patient's ILD.   -Continue cholestyramine TID to increase leflunomide clearance.      Steroid induced diabetes  -Continue insulin - decreased glargine to 6 units 10/5 due to borderline hypoglycemia  -Continue insulin NovoLog at medium insulin resistance     Chronic medical problems  Hypothyroidism   Continue 25 mg levothyroxin qday     SALTY  Continue BiPAP at night.      Hypertension  On 5mg amlodipine, BP well controlled .     Anxiety  Depression  Continue 5mg escitalopram       Diet: Snacks/Supplements Adult: Other; pt may order snacks/supplements PRN; Between Meals  Snacks/Supplements Adult: Ensure Enlive; With Meals  Regular Diet Adult    DVT Prophylaxis: Heparin SQ  Watson Catheter: Not present  Central Lines: None  Code Status: Full Code      Disposition Plan   Expected discharge: 10/13/2021   recommended to transitional care unit once patient has had transplant and had recovery.     The patient's care was discussed with the Patient and Pulmonary (transplant) Consultant.    Shauna Juarez MD  Hospitalist Service, [address]  Securely message with the Vocera Web Console (learn more here)  Text page via [employer] Paging/Directory  Please see sign in/sign out for up to date coverage information    Clinically Significant Risk Factors Present on Admission                ______________________________________________________________________    Interval History   No events overnight. He felt okay overnight and denies complaints this morning. Breathing feels comfortable. No abdominal pain. Voiding/stooling. Eating/drinking. No vomiting. Nursing notes reviewed.    Data reviewed today: I reviewed all  medications, new labs and imaging results over the last 24 hours. I personally reviewed no images or EKG's today.    Physical Exam   Vital Signs: Temp: 98.3  F (36.8  C) Temp src: Oral BP: 125/78 Pulse: 83   Resp: 18 SpO2: 96 % O2 Device: High Flow Nasal Cannula (HFNC) Oxygen Delivery: 30 LPM  Weight: 152 lbs 5.41 oz  Constitutional: Awake, alert and in no distress. Sitting comfortably in bed.  Head: Normocephalic. Atraumatic.   Cardiovascular: Regular rate and rhythm. No murmurs, clicks, gallops or rubs. No edema   Respiratory: Clear to auscultation without wheezes or crackles. Normal respiratory rate and effort.   Gastrointestinal: Abdomen soft, non-tender. BS normal. No masses, organomegaly  Psychiatric: mentation appears normal and affect normal/bright      Data   Recent Labs   Lab 10/06/21  1517 10/06/21  1007 10/06/21  0905 10/05/21  0751 10/05/21  0318 10/04/21  1307 10/04/21  1144 10/04/21  0813 10/04/21  0432 10/02/21  1007 10/02/21  0528 09/30/21  1405 09/30/21  1059   WBC  --  22.0*  --   --  20.9*  --   --   --  18.5*   < > 16.7*   < > 15.0*   HGB  --  11.5*  --   --  10.4*  --   --   --  10.0*   < > 10.6*   < > 10.9*   MCV  --  91  --   --  92  --   --   --  92   < > 90   < > 93   PLT  --  217  --   --  228  --   --   --  218   < > 247   < > 209   NA  --  138  --   --  140  --   --   --  140   < > 137   < > 136   POTASSIUM  --  3.4  --   --  3.5  --  4.3   < > 3.2*   < > 3.5   < > 3.6   CHLORIDE  --  104  --   --  106  --   --   --  107   < > 104   < > 101   CO2  --  32  --   --  28  --   --   --  28   < > 28   < > 26   BUN  --  17  --   --  17  --   --   --  17   < > 16   < > 14   CR  --  0.66  --   --  0.69  --   --   --  0.69   < > 0.68   < > 0.64*   ANIONGAP  --  2*  --   --  6  --   --   --  5   < > 5   < > 9   ERIK  --  9.6  --   --  8.8  --   --   --  8.6   < > 9.4   < > 8.9   * 85 91   < > 125*   < >  --    < > 124*   < > 90   < > 193*   ALBUMIN  --   --   --   --   --   --   --   --   --    --  2.6*  --  2.7*   PROTTOTAL  --   --   --   --   --   --   --   --   --   --  6.0*  --  6.2*   BILITOTAL  --   --   --   --   --   --   --   --   --   --  0.3  --  0.3   ALKPHOS  --   --   --   --   --   --   --   --   --   --  72  --  78   ALT  --   --   --   --   --   --   --   --   --   --  34  --  34   AST  --   --   --   --   --   --   --   --   --   --  13  --  12    < > = values in this interval not displayed.     Medications     - MEDICATION INSTRUCTIONS -       - MEDICATION INSTRUCTIONS -         acyclovir  400 mg Oral TID     amLODIPine  5 mg Oral Daily     azithromycin  250 mg Oral Daily     escitalopram  5 mg Oral QPM     fluticasone  1 puff Inhalation Daily     fluticasone  1 spray Both Nostrils Daily     heparin ANTICOAGULANT  5,000 Units Subcutaneous Q12H     heparin lock flush  5-20 mL Intracatheter Q24H     insulin aspart   Subcutaneous TID AC     insulin aspart  1-7 Units Subcutaneous TID AC     insulin aspart  1-5 Units Subcutaneous At Bedtime     insulin glargine  6 Units Subcutaneous QAM AC     levothyroxine  25 mcg Oral QAM AC     melatonin  5 mg Oral At Bedtime     mycophenolate  1,500 mg Oral BID IS     pantoprazole  40 mg Oral BID AC     predniSONE  20 mg Oral Daily     sodium chloride (PF)  10-40 mL Intracatheter Q8H     sulfamethoxazole-trimethoprim  1 tablet Oral Once per day on Mon Wed Fri     vitamin D2  50,000 Units Oral Q7 Days

## 2021-10-06 NOTE — PLAN OF CARE
Pt A&O X4, answers questions/follows commands appropriately. VSS, afebrile, HR sinus rhythm 70-80's, BP's 110-120's/60-70's, O2 sats > 90% on 40% FiO2 via BiPap at night, wears HFNC @ 40% FiO2 on 30LPM while awake. LS clear/diminished, BS+ X4, CMS/neuros intact. Pt had few requests/complaints, denied pain. PICC X3 in R arm, patent & Heparin locked w/ blood return to all lumens. Tolerating regular diet with no nausea/emesis. Checking BG's ACHS w/ sliding scale & CC. Voiding adequately via urinal, passing gas, BM X1. Gets up ad [place]. Has call light within reach, able to make needs known, will continue to monitor per POC.

## 2021-10-06 NOTE — PLAN OF CARE
Neuro: A&Ox4. Wears glasses.    Cardiac: SR/ST. HR up to 120s when walking halls. VSS.   Respiratory: Sating >90% on HFNC (30L, 40%) and BiPAP 40% overnight. Walked hallways with 15L oxymask and sats remained >90%. Dyspnea on exertion.   GI/: Adequate urine output via urinal. BM X 1  Diet/appetite: Tolerating regular diet. BG ACHS.   Activity:  Independent in room up to commode. SBA when walking hallways.   Pain: Denies.   Skin: Small area of blanchable redness on bridge of nose from BiPAP mask.   LDA's: Right PICC (triple lumen) heparin locked.     Plan:  Continue POC. Continue to walk hallways as tolerated during the day. Patient wonders about the possibility of walking outside.

## 2021-10-07 ENCOUNTER — APPOINTMENT (OUTPATIENT)
Dept: GENERAL RADIOLOGY | Facility: CLINIC | Age: 56
End: 2021-10-07
Attending: INTERNAL MEDICINE
Payer: COMMERCIAL

## 2021-10-07 ENCOUNTER — APPOINTMENT (OUTPATIENT)
Dept: PHYSICAL THERAPY | Facility: CLINIC | Age: 56
End: 2021-10-07
Attending: STUDENT IN AN ORGANIZED HEALTH CARE EDUCATION/TRAINING PROGRAM
Payer: COMMERCIAL

## 2021-10-07 LAB
ALBUMIN UR-MCNC: NEGATIVE MG/DL
APPEARANCE UR: CLEAR
BASOPHILS # BLD MANUAL: 0 10E3/UL (ref 0–0.2)
BASOPHILS NFR BLD MANUAL: 0 %
BILIRUB UR QL STRIP: NEGATIVE
COLOR UR AUTO: ABNORMAL
EOSINOPHIL # BLD MANUAL: 0 10E3/UL (ref 0–0.7)
EOSINOPHIL NFR BLD MANUAL: 0 %
ERYTHROCYTE [DISTWIDTH] IN BLOOD BY AUTOMATED COUNT: 17.2 % (ref 10–15)
GLUCOSE BLDC GLUCOMTR-MCNC: 152 MG/DL (ref 70–99)
GLUCOSE BLDC GLUCOMTR-MCNC: 155 MG/DL (ref 70–99)
GLUCOSE BLDC GLUCOMTR-MCNC: 87 MG/DL (ref 70–99)
GLUCOSE BLDC GLUCOMTR-MCNC: 97 MG/DL (ref 70–99)
GLUCOSE UR STRIP-MCNC: NEGATIVE MG/DL
HCT VFR BLD AUTO: 36.3 % (ref 40–53)
HGB BLD-MCNC: 11.7 G/DL (ref 13.3–17.7)
HGB UR QL STRIP: NEGATIVE
KETONES UR STRIP-MCNC: ABNORMAL MG/DL
LACTATE SERPL-SCNC: 2.9 MMOL/L (ref 0.7–2)
LACTATE SERPL-SCNC: 4.7 MMOL/L (ref 0.7–2)
LEUKOCYTE ESTERASE UR QL STRIP: NEGATIVE
LYMPHOCYTES # BLD MANUAL: 1.1 10E3/UL (ref 0.8–5.3)
LYMPHOCYTES NFR BLD MANUAL: 4 %
MCH RBC QN AUTO: 29.8 PG (ref 26.5–33)
MCHC RBC AUTO-ENTMCNC: 32.2 G/DL (ref 31.5–36.5)
MCV RBC AUTO: 93 FL (ref 78–100)
METAMYELOCYTES # BLD MANUAL: 1.1 10E3/UL
METAMYELOCYTES NFR BLD MANUAL: 4 %
MONOCYTES # BLD MANUAL: 1.1 10E3/UL (ref 0–1.3)
MONOCYTES NFR BLD MANUAL: 4 %
MUCOUS THREADS #/AREA URNS LPF: PRESENT /LPF
MYELOCYTES # BLD MANUAL: 0.5 10E3/UL
MYELOCYTES NFR BLD MANUAL: 2 %
NEUTROPHILS # BLD MANUAL: 23.1 10E3/UL (ref 1.6–8.3)
NEUTROPHILS NFR BLD MANUAL: 86 %
NITRATE UR QL: NEGATIVE
PH UR STRIP: 5.5 [PH] (ref 5–7)
PLAT MORPH BLD: ABNORMAL
PLATELET # BLD AUTO: 230 10E3/UL (ref 150–450)
PROCALCITONIN SERPL-MCNC: <0.05 NG/ML
RBC # BLD AUTO: 3.92 10E6/UL (ref 4.4–5.9)
RBC MORPH BLD: ABNORMAL
RBC URINE: 1 /HPF
SP GR UR STRIP: 1.02 (ref 1–1.03)
UROBILINOGEN UR STRIP-MCNC: NORMAL MG/DL
WBC # BLD AUTO: 26.9 10E3/UL (ref 4–11)
WBC URINE: 1 /HPF

## 2021-10-07 PROCEDURE — 84145 PROCALCITONIN (PCT): CPT | Performed by: INTERNAL MEDICINE

## 2021-10-07 PROCEDURE — 250N000013 HC RX MED GY IP 250 OP 250 PS 637: Performed by: STUDENT IN AN ORGANIZED HEALTH CARE EDUCATION/TRAINING PROGRAM

## 2021-10-07 PROCEDURE — 999N000007 HC SITE CHECK

## 2021-10-07 PROCEDURE — 250N000013 HC RX MED GY IP 250 OP 250 PS 637: Performed by: DENTIST

## 2021-10-07 PROCEDURE — 999N000215 HC STATISTIC HFNC ADULT NON-CPAP

## 2021-10-07 PROCEDURE — 87040 BLOOD CULTURE FOR BACTERIA: CPT | Performed by: INTERNAL MEDICINE

## 2021-10-07 PROCEDURE — 250N000012 HC RX MED GY IP 250 OP 636 PS 637: Performed by: STUDENT IN AN ORGANIZED HEALTH CARE EDUCATION/TRAINING PROGRAM

## 2021-10-07 PROCEDURE — 85027 COMPLETE CBC AUTOMATED: CPT | Performed by: INTERNAL MEDICINE

## 2021-10-07 PROCEDURE — 94660 CPAP INITIATION&MGMT: CPT

## 2021-10-07 PROCEDURE — 250N000012 HC RX MED GY IP 250 OP 636 PS 637: Performed by: DENTIST

## 2021-10-07 PROCEDURE — 36592 COLLECT BLOOD FROM PICC: CPT | Performed by: INTERNAL MEDICINE

## 2021-10-07 PROCEDURE — 71046 X-RAY EXAM CHEST 2 VIEWS: CPT | Mod: 26 | Performed by: RADIOLOGY

## 2021-10-07 PROCEDURE — 71046 X-RAY EXAM CHEST 2 VIEWS: CPT

## 2021-10-07 PROCEDURE — 81001 URINALYSIS AUTO W/SCOPE: CPT | Performed by: INTERNAL MEDICINE

## 2021-10-07 PROCEDURE — 258N000003 HC RX IP 258 OP 636: Performed by: INTERNAL MEDICINE

## 2021-10-07 PROCEDURE — 87086 URINE CULTURE/COLONY COUNT: CPT | Performed by: INTERNAL MEDICINE

## 2021-10-07 PROCEDURE — 120N000003 HC R&B IMCU UMMC

## 2021-10-07 PROCEDURE — 99233 SBSQ HOSP IP/OBS HIGH 50: CPT | Performed by: INTERNAL MEDICINE

## 2021-10-07 PROCEDURE — 999N000157 HC STATISTIC RCP TIME EA 10 MIN

## 2021-10-07 PROCEDURE — 99233 SBSQ HOSP IP/OBS HIGH 50: CPT | Mod: GC | Performed by: INTERNAL MEDICINE

## 2021-10-07 PROCEDURE — 83605 ASSAY OF LACTIC ACID: CPT | Performed by: INTERNAL MEDICINE

## 2021-10-07 PROCEDURE — 250N000011 HC RX IP 250 OP 636: Performed by: INTERNAL MEDICINE

## 2021-10-07 PROCEDURE — 250N000011 HC RX IP 250 OP 636: Performed by: PHYSICIAN ASSISTANT

## 2021-10-07 PROCEDURE — 97110 THERAPEUTIC EXERCISES: CPT | Mod: GP

## 2021-10-07 RX ORDER — LIDOCAINE 40 MG/G
CREAM TOPICAL
Status: DISCONTINUED | OUTPATIENT
Start: 2021-10-07 | End: 2021-10-16

## 2021-10-07 RX ORDER — SODIUM CHLORIDE, SODIUM LACTATE, POTASSIUM CHLORIDE, CALCIUM CHLORIDE 600; 310; 30; 20 MG/100ML; MG/100ML; MG/100ML; MG/100ML
INJECTION, SOLUTION INTRAVENOUS CONTINUOUS
Status: ACTIVE | OUTPATIENT
Start: 2021-10-07 | End: 2021-10-07

## 2021-10-07 RX ADMIN — ESCITALOPRAM OXALATE 5 MG: 5 TABLET, FILM COATED ORAL at 20:32

## 2021-10-07 RX ADMIN — HEPARIN SODIUM 5000 UNITS: 5000 INJECTION, SOLUTION INTRAVENOUS; SUBCUTANEOUS at 09:21

## 2021-10-07 RX ADMIN — INSULIN ASPART 2 UNITS: 100 INJECTION, SOLUTION INTRAVENOUS; SUBCUTANEOUS at 09:30

## 2021-10-07 RX ADMIN — SODIUM CHLORIDE, POTASSIUM CHLORIDE, SODIUM LACTATE AND CALCIUM CHLORIDE 500 ML: 600; 310; 30; 20 INJECTION, SOLUTION INTRAVENOUS at 18:08

## 2021-10-07 RX ADMIN — FLUTICASONE FUROATE 1 PUFF: 100 POWDER RESPIRATORY (INHALATION) at 09:21

## 2021-10-07 RX ADMIN — SODIUM CHLORIDE, POTASSIUM CHLORIDE, SODIUM LACTATE AND CALCIUM CHLORIDE: 600; 310; 30; 20 INJECTION, SOLUTION INTRAVENOUS at 19:16

## 2021-10-07 RX ADMIN — SODIUM CHLORIDE, PRESERVATIVE FREE 5 ML: 5 INJECTION INTRAVENOUS at 16:17

## 2021-10-07 RX ADMIN — HEPARIN SODIUM 5000 UNITS: 5000 INJECTION, SOLUTION INTRAVENOUS; SUBCUTANEOUS at 20:32

## 2021-10-07 RX ADMIN — ACYCLOVIR 400 MG: 200 CAPSULE ORAL at 13:41

## 2021-10-07 RX ADMIN — AZITHROMYCIN MONOHYDRATE 250 MG: 250 TABLET ORAL at 09:21

## 2021-10-07 RX ADMIN — ACYCLOVIR 400 MG: 200 CAPSULE ORAL at 09:21

## 2021-10-07 RX ADMIN — MYCOPHENOLATE MOFETIL 1500 MG: 500 TABLET, FILM COATED ORAL at 09:21

## 2021-10-07 RX ADMIN — SODIUM CHLORIDE, POTASSIUM CHLORIDE, SODIUM LACTATE AND CALCIUM CHLORIDE: 600; 310; 30; 20 INJECTION, SOLUTION INTRAVENOUS at 22:20

## 2021-10-07 RX ADMIN — INSULIN ASPART 9 UNITS: 100 INJECTION, SOLUTION INTRAVENOUS; SUBCUTANEOUS at 14:31

## 2021-10-07 RX ADMIN — ACYCLOVIR 400 MG: 200 CAPSULE ORAL at 20:31

## 2021-10-07 RX ADMIN — PREDNISONE 20 MG: 20 TABLET ORAL at 09:21

## 2021-10-07 RX ADMIN — PANTOPRAZOLE SODIUM 40 MG: 40 TABLET, DELAYED RELEASE ORAL at 16:07

## 2021-10-07 RX ADMIN — Medication 5 MG: at 20:32

## 2021-10-07 RX ADMIN — AMLODIPINE BESYLATE 5 MG: 5 TABLET ORAL at 09:21

## 2021-10-07 RX ADMIN — FLUTICASONE PROPIONATE 1 SPRAY: 50 SPRAY, METERED NASAL at 09:21

## 2021-10-07 RX ADMIN — SODIUM CHLORIDE, PRESERVATIVE FREE 15 ML: 5 INJECTION INTRAVENOUS at 16:07

## 2021-10-07 RX ADMIN — PANTOPRAZOLE SODIUM 40 MG: 40 TABLET, DELAYED RELEASE ORAL at 09:21

## 2021-10-07 RX ADMIN — INSULIN GLARGINE 6 UNITS: 100 INJECTION, SOLUTION SUBCUTANEOUS at 09:23

## 2021-10-07 RX ADMIN — LEVOTHYROXINE SODIUM 25 MCG: 0.03 TABLET ORAL at 09:21

## 2021-10-07 RX ADMIN — INSULIN ASPART 8 UNITS: 100 INJECTION, SOLUTION INTRAVENOUS; SUBCUTANEOUS at 18:42

## 2021-10-07 RX ADMIN — MYCOPHENOLATE MOFETIL 1500 MG: 500 TABLET, FILM COATED ORAL at 18:41

## 2021-10-07 ASSESSMENT — ACTIVITIES OF DAILY LIVING (ADL)
ADLS_ACUITY_SCORE: 10
ADLS_ACUITY_SCORE: 8
ADLS_ACUITY_SCORE: 8
ADLS_ACUITY_SCORE: 10
ADLS_ACUITY_SCORE: 8
ADLS_ACUITY_SCORE: 8

## 2021-10-07 ASSESSMENT — MIFFLIN-ST. JEOR: SCORE: 1453

## 2021-10-07 NOTE — PLAN OF CARE
Neuro: A&Ox4.   Cardiac: SR. VSS.   Respiratory: Sating >90% on HFNC with 40% Fi02 and 30 LPM, wearing BiPap at night. Tolerating 15L oxymask when up in halls.  GI/: Adequate urine output. BM X1 large, brown, formed.  Diet/appetite: Tolerating regular diet. Eating well.  Activity:  Assist of up ad [place] to commode, SBA in halls, up to chair and in halls.  Pain: Denies  Skin: No new deficits noted.  LDA's: Triple lumen PICC to RUE.    Plan: Continue with POC. Notify primary team with changes.

## 2021-10-07 NOTE — PLAN OF CARE
"Neuro: A&Ox4. Wears glasses. Reportedly flat affect during the day. States \"I knew there'd be a wait (for transplant), but I just didn't think about it being this long.\" Mood significantly improved after walking outside.   Cardiac: SR/ST. VSS.   Respiratory: Sating >90% on BiPAP 40% overnight. HFNC 30L 40% during the day. Tolerated walk with oxymask 15L. Tolerated oxymask 10L while sitting during breaks on walk. States he was able to do some activities in the therapy gym on 10L.   GI/: Adequate urine output via urinal. No BM overnight.   Diet/appetite: Tolerating regular diet. Eating well.  Activity:  Independent in room up to commode. SBA assist in halls. Walked outside last evening. Planning for family to bring additional shoes and jacket as he would like to continue walking and sitting outside as able.    Pain: Denies.   Skin: No new deficits noted. Blanchable redness on bridge of nose likely due to BiPAP usage overnight.   LDA's: Right triple lumen PICC (heparin locked).     Plan: Continue with POC. Notify primary team with changes.    "

## 2021-10-07 NOTE — PROGRESS NOTES
[facility]   Pulmonary   Progress Note  Edson Thornton MRN: [id number]  1965  Date of Admission:9/5/2021  Date of Service: 10/07/2021        Assessment & Plan    56-year-old male (BMI 27.6) with history of NSIP/ILD, RA (status post Rituxan infusion, leflunomide), transferred from OSH for acute on chronic hypoxemic respiratory failure refractory to treatment. Now listed for transplant and treating with MMF and pred at 20 mg qday.    1. Acute on chronic hypoxemic respiratory failure, stable  2. NSIP-ILD  3. Rheumatoid arthritis (positive anti-CCP, RF)     Discussion:   Patient's O2 requirements remain unchanged despite drop in prednisone 20 mg daily started on 9/27. MMF can take up to 4 weeks for its antiinflammatory effect to occur. Increased dose of MMF on 9/30 and repeat CT showed slight improvement in ground glass opacities. We note the 9mm LLL ground glass opacity which is of unclear significance at this point and will not  in the immediate future.    Recommendations    -No changes today  -Continue to trend CRP q48  -Continue MMF 1500 mg BID, pred 20 mg qday, azithromycin    We will continue to follow.     Patient seen & discussed w/  Dr. William Weaver MD   Pulmonary/Critical Care Fellow   Pager #[phone]    Interval History      RN notes reviewed, no acute events overnight. Walked around on 15L by mask yesterday. Continues to require HFNC.    Five-point ROS is negative except for what is noted in the interval history.    Physical Exam Temp:  [97.3  F (36.3  C)-98.5  F (36.9  C)] 98.5  F (36.9  C)  Pulse:  [74-96] 74  Resp:  [16-20] 20  BP: (119-125)/(69-78) 122/77  FiO2 (%):  [40 %-45 %] 40 %  SpO2:  [89 %-98 %] 95 %  I/O last 3 completed shifts:  In: 730 [P.O.:720; I.V.:10]  Out: 800 [Urine:800]  Wt Readings from Last 1 Encounters:   10/07/21 71.2 kg (156 lb 15.5 oz)    Body mass index is 26.94 kg/m . FiO2 (%): 40 %  Resp: 20      Exam:  General:  Sitting  upright on HFNC, cooperative, NAD.  HEENT: Anicteric sclera. EOMI.  Lungs: Bibasilar crackles; speaking in full sentences on HFNC 45% O2 at 30 LPM.   Abd: Soft, NT, ND  Ext: WWP,  No LE edema  Skin: No cyanosis, or jaundice  Neuro: AAOx3, no focal deficits    Data   Labs: reviewed in EMR and notable labs listed below.  CBC and BMP generally stable.   CRP increased on 9/29 but decreased and stabilized since 10/2    Imaging: reviewed in EMR and notable imaging listed below.    Chest X-Ray 2 views 9/23/21  IMPRESSION: Continued decrease in interstitial and airspace opacities,  compared to chest x-ray 9/19/2021. Enlarged cardiomediastinal  silhouette.    Chest CT high resolution 9/7/21:  IMPRESSION:   1. Extensive bilateral groundglass and reticular opacities are  slightly increased compared to chest CT from 8/30/2021. This likely  represents progression of known NSIP, however superimposed infection  or cannot be excluded.  2. Small bilateral pleural effusions.  3. Stable mediastinal lymphadenopathy, likely reactive.    CT 9/30/21  IMPRESSION: Overall slightly improved groundglass opacities and  organizing pneumonia pattern but still quite prominent. Mild  bronchiectasis. Minimal air trapping. Focal 9 mm left lower lobe  groundglass opacity was likely present before but is more conspicuous  due to the surrounding improved aeration currently. Small hiatal  hernia. Multiple nonenlarged mediastinal lymph nodes similar in  appearance.     Medications     acyclovir  400 mg Oral TID     amLODIPine  5 mg Oral Daily     azithromycin  250 mg Oral Daily     escitalopram  5 mg Oral QPM     fluticasone  1 puff Inhalation Daily     fluticasone  1 spray Both Nostrils Daily     heparin ANTICOAGULANT  5,000 Units Subcutaneous Q12H     heparin lock flush  5-20 mL Intracatheter Q24H     insulin aspart   Subcutaneous TID AC     insulin aspart  1-7 Units Subcutaneous TID AC     insulin aspart  1-5 Units Subcutaneous At Bedtime     insulin  glargine  6 Units Subcutaneous QAM AC     levothyroxine  25 mcg Oral QAM AC     melatonin  5 mg Oral At Bedtime     mycophenolate  1,500 mg Oral BID IS     pantoprazole  40 mg Oral BID AC     predniSONE  20 mg Oral Daily     sodium chloride (PF)  10-40 mL Intracatheter Q8H     sulfamethoxazole-trimethoprim  1 tablet Oral Once per day on Mon Wed Fri     vitamin D2  50,000 Units Oral Q7 Days

## 2021-10-07 NOTE — PROGRESS NOTES
[facility]    Medicine Progress Note - Hospitalist Service, Gold 10       Date of Admission:  9/5/2021    Assessment & Plan         Edson Thornton is a 56 year old male with history of NSIP associated with rheumatoid arthritis and traction bronchiectasis who was admitted on 9/5/2021 with acute on chronic hypoxic respiratory failure likely from ILD. Weaning steroids now in preparation for transplant listing. 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    Today:  -No further weaning of steroids per pulm  -Triggered sepsis protocol due to elevated HR and WBC  --Blood culture, urine culture, CXR  --fluid bolus  --Trending inflammatory markers    Acute on chronic hypoxic respiratory failure  secondary to progressive ILD  Although the patient had tachypnea and leukocytosis on 9/16/2021, sepsis has been effectively ruled out.  2 view chest x-ray showed improvement in the patient's known interstitial lung disease.  -Appreciate pulm consult  -Continue BiPAP at night, HFNC during day  -Continue azithromycin as an anti-inflammatory  -Continue prednisone taper based on recommendations of pulmonology service (currently on 20 mg daily)  -Continue MMF 1500 mg twice daily, per pulmonary  -IV diuresis 10/5 due to rising weight--no further diuresis 10/6      Lung transplant candidacy  -Perimolar abscess excision completed on 9/22/2021 in OR by dentistry. Discussed with dental team 9/24, no indication for antibiotics post tooth extraction   -Although the patient had indeterminate quanteferon gold, this was deemed to be low risk for active or latent tuberculosis based on evaluation of transplant infectious disease.  -The patient received ivermectin at 15 mg X1 dose for treatment of possible stronglydies based on recommendations of transplant ID  -Negative urinary coccidoses antigen   -The patient will need monthly coccidoses antigen for 12 months after transplant  - When  transplanted please send, in addition to the lungs, a mediastinal LN for pathology, fungal cx and AFB smear and cx.   -CT chest w/o contrast with slightly improved groundglass opacities (but still quite prominent)  -The patient will likely need to follow up with ID as an outpatient within 2 months of lung transplant to follow up on the urinary Cocci Ag and the LN and lungs biopsies.   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  Physical exam with chronic crusted over lesions throughout lower jaw.  Consulted to dermatology and jaw lesions HSV 2+ with initiation of acyclovir.  Preliminary plan to continue 3 times daily acyclovir for 10 days.  May need longer depending on immunosuppression/resolution of lesions.  -Consult dermatology, appreciate recs  -Continue acyclovir 400 mg 3 times daily x10 days (started 9/30)  -No current need for precautions (not disseminated) at this time     Elevated Lactate - intermittent, resolved  Intermittently found to have elevated lactate after triggering sepsis. Usually resolved with minimal/no interventions (I.e. small fluid bolus). Unclear etiology.   --Elevated 10/7-- bolusing     Moderate (Non-severe) malnutrition in the context of chronic illness, all are POA  Malnutrition:    - Level of malnutrition: Non-Severe   - Based on: weight loss, mild (or greater) muscle loss  -Nutrition consulted  -Calorie counts, trend weights      Thrombocytopenia, resolved  Anemia- likely anemia of chronic disease. Baseline around 10-- where he has been while hospitalized.    Leukocytosis -worsening. No other signs of acute infection. Continuing to monitor but suspect due to stress response, steroids.  --Cultures obtained, trending  inflammatory markers     Vitamin D deficiency, POA  -Continue vitamin D replacement     Rheumatoid arthritis  Patient seen by rheumatology this admission. No acute flare of rheumatoid arthritis. SINCERE antibodies negative, flow cytometry negative for CD19 cells, CK normal, aldolase normal. Anti-Irene-1 negative. The patient was treated with leflunomide from 5/21 to 7/21. Rheumatology considers that leflunomide toxicity might contribute to the patient's ILD.   -Continue cholestyramine TID to increase leflunomide clearance.      Steroid induced diabetes  -Continue insulin - decreased glargine to 6 units 10/5 due to borderline hypoglycemia  -Continue insulin NovoLog at medium insulin resistance     Chronic medical problems  Hypothyroidism   Continue 25 mg levothyroxin qday     SALTY  Continue BiPAP at night.      Hypertension  On 5mg amlodipine, BP well controlled .     Anxiety  Depression  Continue 5mg escitalopram       Diet: Snacks/Supplements Adult: Other; pt may order snacks/supplements PRN; Between Meals  Snacks/Supplements Adult: Ensure Enlive; With Meals  Regular Diet Adult    DVT Prophylaxis: Heparin SQ  Watson Catheter: Not present  Central Lines: None  Code Status: Full Code      Disposition Plan   Expected discharge: 10/14/2021   recommended to TBD  once has lung transplant/recovers.     The patient's care was discussed with the Bedside Nurse, Patient and Pulmonary  Consultant.    Shauna Juarez MD  Hospitalist Service, [address]  Securely message with the Vocera Web Console (learn more here)  Text page via [facility] Paging/Directory  Please see sign in/sign out for up to date coverage information    Clinically Significant Risk Factors Present on Admission                ______________________________________________________________________    Interval History   He feels good. Breathing feels about the same. No fevers. He denies diarrhea, abdominal pain. No  complaints other than long wait for transplant. Skin lesions on chin are much improved    Data reviewed today: I reviewed all medications, new labs and imaging results over the last 24 hours. I personally reviewed no images or EKG's today.    Physical Exam   Vital Signs: Temp: 98.5  F (36.9  C) Temp src: Oral BP: 122/77 Pulse: 74   Resp: 20 SpO2: 95 % O2 Device: High Flow Nasal Cannula (HFNC) Oxygen Delivery: 30 LPM  Weight: 156 lbs 15.48 oz  Constitutional: Awake, alert and in no distress. Sitting comfortably in up in chair.  Head: Normocephalic. Atraumatic.   Cardiovascular: Regular rate and rhythm. No murmurs, clicks, gallops or rubs. No edema  Respiratory: Clear to auscultation without wheezes or crackles. Normal respiratory rate and effort.   Gastrointestinal: Abdomen soft, non-tender. BS normal.   Musculoskeletal: extremities normal- no gross deformities noted and normal muscle tone  Skin: clubbing of fingernails. Has erythema on left lateral jaw but no visualized vesicles or blisters  Psychiatric: mentation appears normal and affect normal/bright    Data   Recent Labs   Lab 10/07/21  1341 10/07/21  0827 10/06/21  2202 10/06/21  1517 10/06/21  1007 10/05/21  0751 10/05/21  0318 10/04/21  1307 10/04/21  1144 10/04/21  0813 10/04/21  0432 10/02/21  1007 10/02/21  0528   WBC  --   --   --   --  22.0*  --  20.9*  --   --   --  18.5*   < > 16.7*   HGB  --   --   --   --  11.5*  --  10.4*  --   --   --  10.0*   < > 10.6*   MCV  --   --   --   --  91  --  92  --   --   --  92   < > 90   PLT  --   --   --   --  217  --  228  --   --   --  218   < > 247   NA  --   --   --   --  138  --  140  --   --   --  140   < > 137   POTASSIUM  --   --   --   --  3.4  --  3.5  --  4.3   < > 3.2*   < > 3.5   CHLORIDE  --   --   --   --  104  --  106  --   --   --  107   < > 104   CO2  --   --   --   --  32  --  28  --   --   --  28   < > 28   BUN  --   --   --   --  17  --  17  --   --   --  17   < > 16   CR  --   --   --   --  0.66   --  0.69  --   --   --  0.69   < > 0.68   ANIONGAP  --   --   --   --  2*  --  6  --   --   --  5   < > 5   ERIK  --   --   --   --  9.6  --  8.8  --   --   --  8.6   < > 9.4   GLC 97 87 153*   < > 85   < > 125*   < >  --    < > 124*   < > 90   ALBUMIN  --   --   --   --   --   --   --   --   --   --   --   --  2.6*   PROTTOTAL  --   --   --   --   --   --   --   --   --   --   --   --  6.0*   BILITOTAL  --   --   --   --   --   --   --   --   --   --   --   --  0.3   ALKPHOS  --   --   --   --   --   --   --   --   --   --   --   --  72   ALT  --   --   --   --   --   --   --   --   --   --   --   --  34   AST  --   --   --   --   --   --   --   --   --   --   --   --  13    < > = values in this interval not displayed.     Medications     lactated ringers       - MEDICATION INSTRUCTIONS -       - MEDICATION INSTRUCTIONS -         acyclovir  400 mg Oral TID     amLODIPine  5 mg Oral Daily     azithromycin  250 mg Oral Daily     escitalopram  5 mg Oral QPM     fluticasone  1 puff Inhalation Daily     fluticasone  1 spray Both Nostrils Daily     heparin ANTICOAGULANT  5,000 Units Subcutaneous Q12H     heparin lock flush  5-20 mL Intracatheter Q24H     insulin aspart   Subcutaneous TID AC     insulin aspart  1-7 Units Subcutaneous TID AC     insulin aspart  1-5 Units Subcutaneous At Bedtime     insulin glargine  6 Units Subcutaneous QAM AC     lactated ringers  500 mL Intravenous Once     levothyroxine  25 mcg Oral QAM AC     melatonin  5 mg Oral At Bedtime     mycophenolate  1,500 mg Oral BID IS     pantoprazole  40 mg Oral BID AC     predniSONE  20 mg Oral Daily     sodium chloride (PF)  10-40 mL Intracatheter Q8H     sodium chloride (PF)  3 mL Intracatheter Q8H     sulfamethoxazole-trimethoprim  1 tablet Oral Once per day on Mon Wed Fri     vitamin D2  50,000 Units Oral Q7 Days

## 2021-10-08 ENCOUNTER — APPOINTMENT (OUTPATIENT)
Dept: PHYSICAL THERAPY | Facility: CLINIC | Age: 56
End: 2021-10-08
Attending: STUDENT IN AN ORGANIZED HEALTH CARE EDUCATION/TRAINING PROGRAM
Payer: COMMERCIAL

## 2021-10-08 LAB
BASOPHILS # BLD MANUAL: 0 10E3/UL (ref 0–0.2)
BASOPHILS NFR BLD MANUAL: 0 %
CRP SERPL-MCNC: 34 MG/L (ref 0–8)
EOSINOPHIL # BLD MANUAL: 0 10E3/UL (ref 0–0.7)
EOSINOPHIL NFR BLD MANUAL: 0 %
ERYTHROCYTE [DISTWIDTH] IN BLOOD BY AUTOMATED COUNT: 17 % (ref 10–15)
GLUCOSE BLDC GLUCOMTR-MCNC: 101 MG/DL (ref 70–99)
GLUCOSE BLDC GLUCOMTR-MCNC: 106 MG/DL (ref 70–99)
GLUCOSE BLDC GLUCOMTR-MCNC: 119 MG/DL (ref 70–99)
GLUCOSE BLDC GLUCOMTR-MCNC: 168 MG/DL (ref 70–99)
GLUCOSE BLDC GLUCOMTR-MCNC: 198 MG/DL (ref 70–99)
GLUCOSE BLDC GLUCOMTR-MCNC: 89 MG/DL (ref 70–99)
HCT VFR BLD AUTO: 34.7 % (ref 40–53)
HGB BLD-MCNC: 11.1 G/DL (ref 13.3–17.7)
LACTATE SERPL-SCNC: 1.6 MMOL/L (ref 0.7–2)
LYMPHOCYTES # BLD MANUAL: 3.4 10E3/UL (ref 0.8–5.3)
LYMPHOCYTES NFR BLD MANUAL: 15 %
MCH RBC QN AUTO: 29.8 PG (ref 26.5–33)
MCHC RBC AUTO-ENTMCNC: 32 G/DL (ref 31.5–36.5)
MCV RBC AUTO: 93 FL (ref 78–100)
METAMYELOCYTES # BLD MANUAL: 0.2 10E3/UL
METAMYELOCYTES NFR BLD MANUAL: 1 %
MONOCYTES # BLD MANUAL: 0.9 10E3/UL (ref 0–1.3)
MONOCYTES NFR BLD MANUAL: 4 %
MYELOCYTES # BLD MANUAL: 0.9 10E3/UL
MYELOCYTES NFR BLD MANUAL: 4 %
NEUTROPHILS # BLD MANUAL: 17 10E3/UL (ref 1.6–8.3)
NEUTROPHILS NFR BLD MANUAL: 76 %
PLAT MORPH BLD: ABNORMAL
PLATELET # BLD AUTO: 211 10E3/UL (ref 150–450)
POLYCHROMASIA BLD QL SMEAR: SLIGHT
RBC # BLD AUTO: 3.72 10E6/UL (ref 4.4–5.9)
RBC MORPH BLD: ABNORMAL
SARS-COV-2 RNA RESP QL NAA+PROBE: NEGATIVE
WBC # BLD AUTO: 22.4 10E3/UL (ref 4–11)

## 2021-10-08 PROCEDURE — 999N000157 HC STATISTIC RCP TIME EA 10 MIN

## 2021-10-08 PROCEDURE — 97110 THERAPEUTIC EXERCISES: CPT | Mod: GP

## 2021-10-08 PROCEDURE — 250N000012 HC RX MED GY IP 250 OP 636 PS 637: Performed by: STUDENT IN AN ORGANIZED HEALTH CARE EDUCATION/TRAINING PROGRAM

## 2021-10-08 PROCEDURE — 120N000003 HC R&B IMCU UMMC

## 2021-10-08 PROCEDURE — 85027 COMPLETE CBC AUTOMATED: CPT | Performed by: INTERNAL MEDICINE

## 2021-10-08 PROCEDURE — 250N000011 HC RX IP 250 OP 636: Performed by: PHYSICIAN ASSISTANT

## 2021-10-08 PROCEDURE — 250N000013 HC RX MED GY IP 250 OP 250 PS 637: Performed by: DENTIST

## 2021-10-08 PROCEDURE — 250N000013 HC RX MED GY IP 250 OP 250 PS 637: Performed by: STUDENT IN AN ORGANIZED HEALTH CARE EDUCATION/TRAINING PROGRAM

## 2021-10-08 PROCEDURE — 94660 CPAP INITIATION&MGMT: CPT

## 2021-10-08 PROCEDURE — 999N000157 HC STATISTIC RCP TIME EA 10 MIN: Performed by: PEDIATRICS

## 2021-10-08 PROCEDURE — U0005 INFEC AGEN DETEC AMPLI PROBE: HCPCS | Performed by: INTERNAL MEDICINE

## 2021-10-08 PROCEDURE — 250N000011 HC RX IP 250 OP 636: Performed by: INTERNAL MEDICINE

## 2021-10-08 PROCEDURE — 36592 COLLECT BLOOD FROM PICC: CPT | Performed by: INTERNAL MEDICINE

## 2021-10-08 PROCEDURE — 250N000012 HC RX MED GY IP 250 OP 636 PS 637: Performed by: DENTIST

## 2021-10-08 PROCEDURE — 250N000012 HC RX MED GY IP 250 OP 636 PS 637: Performed by: INTERNAL MEDICINE

## 2021-10-08 PROCEDURE — 83605 ASSAY OF LACTIC ACID: CPT | Performed by: INTERNAL MEDICINE

## 2021-10-08 PROCEDURE — 86140 C-REACTIVE PROTEIN: CPT | Performed by: INTERNAL MEDICINE

## 2021-10-08 PROCEDURE — 99233 SBSQ HOSP IP/OBS HIGH 50: CPT | Performed by: INTERNAL MEDICINE

## 2021-10-08 RX ADMIN — AZITHROMYCIN MONOHYDRATE 250 MG: 250 TABLET ORAL at 08:25

## 2021-10-08 RX ADMIN — ACYCLOVIR 400 MG: 200 CAPSULE ORAL at 21:15

## 2021-10-08 RX ADMIN — INSULIN ASPART 6 UNITS: 100 INJECTION, SOLUTION INTRAVENOUS; SUBCUTANEOUS at 13:17

## 2021-10-08 RX ADMIN — HEPARIN SODIUM 5000 UNITS: 5000 INJECTION, SOLUTION INTRAVENOUS; SUBCUTANEOUS at 21:15

## 2021-10-08 RX ADMIN — FLUTICASONE PROPIONATE 1 SPRAY: 50 SPRAY, METERED NASAL at 08:24

## 2021-10-08 RX ADMIN — PREDNISONE 20 MG: 20 TABLET ORAL at 08:25

## 2021-10-08 RX ADMIN — PANTOPRAZOLE SODIUM 40 MG: 40 TABLET, DELAYED RELEASE ORAL at 08:25

## 2021-10-08 RX ADMIN — PANTOPRAZOLE SODIUM 40 MG: 40 TABLET, DELAYED RELEASE ORAL at 16:36

## 2021-10-08 RX ADMIN — INSULIN ASPART 12 UNITS: 100 INJECTION, SOLUTION INTRAVENOUS; SUBCUTANEOUS at 18:51

## 2021-10-08 RX ADMIN — INSULIN GLARGINE 6 UNITS: 100 INJECTION, SOLUTION SUBCUTANEOUS at 08:24

## 2021-10-08 RX ADMIN — SODIUM CHLORIDE, PRESERVATIVE FREE 10 ML: 5 INJECTION INTRAVENOUS at 16:36

## 2021-10-08 RX ADMIN — MYCOPHENOLATE MOFETIL 1500 MG: 500 TABLET, FILM COATED ORAL at 08:25

## 2021-10-08 RX ADMIN — FLUTICASONE FUROATE 1 PUFF: 100 POWDER RESPIRATORY (INHALATION) at 08:24

## 2021-10-08 RX ADMIN — ACYCLOVIR 400 MG: 200 CAPSULE ORAL at 14:10

## 2021-10-08 RX ADMIN — Medication 5 MG: at 21:15

## 2021-10-08 RX ADMIN — AMLODIPINE BESYLATE 5 MG: 5 TABLET ORAL at 08:25

## 2021-10-08 RX ADMIN — SULFAMETHOXAZOLE AND TRIMETHOPRIM 1 TABLET: 800; 160 TABLET ORAL at 08:41

## 2021-10-08 RX ADMIN — INSULIN ASPART 2 UNITS: 100 INJECTION, SOLUTION INTRAVENOUS; SUBCUTANEOUS at 08:42

## 2021-10-08 RX ADMIN — HEPARIN SODIUM 5000 UNITS: 5000 INJECTION, SOLUTION INTRAVENOUS; SUBCUTANEOUS at 08:25

## 2021-10-08 RX ADMIN — LEVOTHYROXINE SODIUM 25 MCG: 0.03 TABLET ORAL at 08:25

## 2021-10-08 RX ADMIN — MYCOPHENOLATE MOFETIL 1500 MG: 500 TABLET, FILM COATED ORAL at 18:52

## 2021-10-08 RX ADMIN — ACYCLOVIR 400 MG: 200 CAPSULE ORAL at 08:25

## 2021-10-08 RX ADMIN — ESCITALOPRAM OXALATE 5 MG: 5 TABLET, FILM COATED ORAL at 21:15

## 2021-10-08 ASSESSMENT — ACTIVITIES OF DAILY LIVING (ADL)
ADLS_ACUITY_SCORE: 8

## 2021-10-08 NOTE — PLAN OF CARE
NEURO: A&Ox4  TELE: SR/ST in the 100s-110s  VITALS: Stable  CV: RRR, frequently tachy especially with activity, pulses strong  PULM: LS CTA and diminished in LLL, sats stable on 40% fiO2 via HFNC.  Currently on Bipap at 40% fiO2 for night.  GI: +BS, on a regular diet, excellent appetite, last BM today  : UOP adequate  SKIN: Has some bruising on abdomen and forearms.  HSV lesions on chin.  LABS: Lactic elevated, see previous notes.  WBC count also elevated at 26.9.  LDA: Right PICC  GTT: LR @ 150ml/hr  PAIN: Denies  ACTIVITY: Up independently, walking in the room and up in the chair most of the evening  PLAN: Continue cares on 6B

## 2021-10-08 NOTE — PROVIDER NOTIFICATION
10/07/21 2000   Call Information   Date of Call 10/07/21   Time of Call 2024   Name of person requesting the team Hodan DO   Title of person requesting team RN   RRT Arrival time 2024   Time RRT ended 2027   Reason for call   Type of RRT Adult   Primary reason for call Sepsis suspected   Sepsis Suspected Elevated Lactate level;WBC <4 or >12   Was patient transferred from the ED, ICU, or PACU within last 24 hours prior to RRT call? No   SBAR   Situation LA 4.7   Background  PMH ILD and rheumatoid lung disease, RA, SALTY, hypothyroid, HTN,anxiety and depression, HLD, duodenal anomaly admitted on 9/5/2021 in transfer for lung transplant workup and risk of need for ECMO.    Notable History/Conditions End-Stage disease;Hypertension   Assessment A+Ox4, VSS   Interventions No interventions   Patient Outcome   Patient Outcome Stabilized on unit   RRT Team   Attending/Primary/Covering Physician Med Gold 10   Date Attending Physician notified 10/07/21   Time Attending Physician notified 2024   Physician(s) Goyo HAZEL   Lead RN Nathan Schumacher   Post RRT Intervention Assessment   Post RRT Assessment Stable/Improved   Date Follow Up Done 10/08/21   Time Follow Up Done 0014   Comments pt sleeping on bipap 97%

## 2021-10-08 NOTE — CODE/RAPID RESPONSE
Rapid Response Team Note    Assessment   A rapid response was called on Edson Thornton due to lactic acidosis. This presentation is likely due to ILD.    This is a 56 year old with NSIP, RA and bronchiectasis here with an ILD flare.  He triggered our LA protocol today, received IV fluids and now has an increased LA at 4.7.  He denies fevers, chills, new pain or worsened shortness of breath.  BP stable, O2 needs unchanged.  He has leukocytosis but is also on steroids.    - BC, UC already in process from earlier today  - CXR from earlier today unchanged from prior  - Received 500 cc bolus earlier.      Plan   -  He has had intermittent elevated LA levels this admission of unclear etiology.  I do not see an indication to start broad spectrum antibiotics, and I am leery of excessive IV fluids given his respiratory status.  I will discuss with his primary team, but I suspect we would be better served following vital signs than necessarily trending lactic acids.  -  The Internal Medicine primary team was paged and currently awaiting a response.  -  Disposition: The patient will remain on the current unit. We will continue to monitor this patient closely.  -  Reassessment and plan follow-up will be performed by the rapid response team    ILIR Mata CNP  [facility] RRT [facility] Job Code Contact #[phone]    Hospital Course   Brief Summary of events leading to rapid response:   See above    Admission Diagnosis:   ILD (interstitial lung disease) (H) [J84.9]     Physical Exam   Temp: 98.7  F (37.1  C) Temp  Min: 97.8  F (36.6  C)  Max: 98.7  F (37.1  C)  Resp: 24 Resp  Min: 18  Max: 24  SpO2: 94 % SpO2  Min: 94 %  Max: 97 %  Pulse: 107 Pulse  Min: 74  Max: 123    No data recorded  BP: 131/78 Systolic (24hrs), Av , Min:122 , Max:157   Diastolic (24hrs), Av, Min:70, Max:90     I/Os: I/O last 3 completed shifts:  In: 1440 [P.O.:1440]  Out: 650 [Urine:650]     Exam:   General: chronically ill  appearing  Mental Status: AAOx4.  HR regular  Good air movement both lungs, no wheezing    Significant Results and Procedures   Lactic Acid:   Recent Labs   Lab Test 10/07/21  1934 10/07/21  1621 10/05/21  0318   LACT 4.7* 2.9* 1.2     CBC:   Recent Labs   Lab Test 10/07/21  1727 10/06/21  1007 10/05/21  0318   WBC 26.9* 22.0* 20.9*   HGB 11.7* 11.5* 10.4*   HCT 36.3* 34.7* 32.2*    217 228        Sepsis Evaluation   The patient is not known to have an infection.  NO EVIDENCE OF SEPSIS at this time.  Vital sign, physical exam, and lab findings are due to ILD.

## 2021-10-08 NOTE — PROVIDER NOTIFICATION
MD notified that pt tripped the sepsis protocol (d/t an elevated HR of 110 and elevated WBC count) and that pt's lactic acid is 2.9.  Orders received for LR bolus and then continuous infusion at 150ml/hr, UA/C, BCx, a CBC, a CXR and repeat lactic at 2000.

## 2021-10-08 NOTE — PROVIDER NOTIFICATION
MD notified of critical lactic acid 4.7.  RRT was also called per sepsis protocol.  Pt's VSS at this time and pt has no increase in O2 needs or any other new complaints.

## 2021-10-08 NOTE — PROGRESS NOTES
[facility]    Medicine Progress Note - Hospitalist Service, Gold 10       Date of Admission:  9/5/2021    Assessment & Plan          Edson Thornton is a 56 year old male with history of NSIP associated with rheumatoid arthritis and traction bronchiectasis who was admitted on 9/5/2021 with acute on chronic hypoxic respiratory failure likely from ILD. Weaning steroids now in preparation for transplant listing. 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    Today:  -Spoke with ID re: inflammatory markers and intermittent lactic acidosis- still not thought to represent infection  -Continue holding off on further antibiotics  -If loose stools develop-- would check C.diff    Acute on chronic hypoxic respiratory failure  secondary to progressive ILD  Although the patient had tachypnea and leukocytosis on 9/16/2021, sepsis has been effectively ruled out.  2 view chest x-ray showed improvement in the patient's known interstitial lung disease.  -Appreciate pulm consult  -Continue BiPAP at night, HFNC during day  -Continue azithromycin as an anti-inflammatory  -Continue prednisone 20 mg daily (no plans for further taper)  -Continue MMF 1500 mg twice daily, per pulmonary  -IV diuresis 10/5 due to rising weight--no further diuresis 10/6-10/8      Lung transplant candidacy  -Perimolar abscess excision completed on 9/22/2021 in OR by dentistry. Discussed with dental team 9/24, no indication for antibiotics post tooth extraction   -Although the patient had indeterminate quanteferon gold, this was deemed to be low risk for active or latent tuberculosis based on evaluation of transplant infectious disease.  -The patient received ivermectin at 15 mg X1 dose for treatment of possible stronglydies based on recommendations of transplant ID  -Negative urinary coccidoses antigen   -The patient will need monthly coccidoses antigen for 12 months after transplant  - When transplanted  please send, in addition to the lungs, a mediastinal LN for pathology, fungal cx and AFB smear and cx.   -CT chest w/o contrast with slightly improved groundglass opacities (but still quite prominent)  -The patient will likely need to follow up with ID as an outpatient within 2 months of lung transplant to follow up on the urinary Cocci Ag and the LN and lungs biopsies.   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  Physical exam with chronic crusted over lesions throughout lower jaw.  Consulted to dermatology and jaw lesions HSV 2+ with initiation of acyclovir.  Preliminary plan to continue 3 times daily acyclovir for 10 days.  May need longer depending on immunosuppression/resolution of lesions.  -Consult dermatology, appreciate recs  -Continue acyclovir 400 mg 3 times daily x10 days (started 9/30)  -No current need for precautions (not disseminated) at this time     Elevated Lactate - intermittent, resolved  Intermittently found to have elevated lactate after triggering sepsis due to WBC and HR. Usually resolved with minimal/no interventions (I.e. small fluid bolus). Unclear etiology- suspect due to high metabolic stress due to hypoxia vs spurrious from blood draw. Low suspicion for sepsis  -Continue to monitor clinically     Moderate (Non-severe) malnutrition in the context of chronic illness, all are POA  Malnutrition:    - Level of malnutrition: Non-Severe   - Based on: weight loss, mild (or greater) muscle loss  -Nutrition consulted  -Calorie counts, trend weights      Thrombocytopenia, resolved  Anemia- likely anemia of chronic disease. Baseline around 10-- where he has been while hospitalized.    Leukocytosis -worsening. No other signs of  acute infection. Continuing to monitor but suspect due to stress response, steroids. As above-- spoke again with ID on 10/8 and not thought to be missing infection. Inflammatory marker trend is mild and is without fever/chills, increased dyspnea, diarrhea, vomiting, abdominal pain, chest pain, rash, joint pain.  --Cultures obtained, trending inflammatory markers  -Blood cultures NGTD  -Urine culture NGTD     Vitamin D deficiency, POA  -Continue vitamin D replacement     Rheumatoid arthritis  Patient seen by rheumatology this admission. No acute flare of rheumatoid arthritis. SINCERE antibodies negative, flow cytometry negative for CD19 cells, CK normal, aldolase normal. Anti-Irene-1 negative. The patient was treated with leflunomide from 5/21 to 7/21. Rheumatology considers that leflunomide toxicity might contribute to the patient's ILD.   -Continue cholestyramine TID to increase leflunomide clearance.      Steroid induced diabetes  -Continue insulin - decreased glargine to 6 units 10/5 due to borderline hypoglycemia  -Continue insulin NovoLog at medium insulin resistance     Chronic medical problems  Hypothyroidism   Continue 25 mg levothyroxine qday     SALTY  Continue BiPAP at night.      Hypertension  On 5mg amlodipine, BP well controlled .     Anxiety  Depression  Continue 5mg escitalopram       Diet: Snacks/Supplements Adult: Other; pt may order snacks/supplements PRN; Between Meals  Snacks/Supplements Adult: Ensure Enlive; With Meals  Regular Diet Adult    DVT Prophylaxis: Heparin SQ  Watson Catheter: Not present  Central Lines: None  Code Status: Full Code      Disposition Plan   Expected discharge: 10/14/2021   recommended to TBD once receives lung transplant.     The patient's care was discussed with the Care Coordinator/, Patient and Pulmonary Transplant, Pulmonary (General), Infectious Disease (Transplant) Consultant.    Shauna Juarez MD  Hospitalist Service, [address]  [facility]  Securely message with the Vocera Web Console (learn more here)  Text page via [facility] Paging/Directory  Please see sign in/sign out for up to date coverage information    Clinically Significant Risk Factors Present on Admission                ______________________________________________________________________    Interval History   Doing well. Denies feeling anxious or more ill in the evening when he triggered sepsis protocol due to mildly elevated heart rate and elevated lactate. No abdominal pain, nausea, vomiting. No change in his breathing- no new shortness of breath. No diarrhea. No new rashes or joint pains.     Data reviewed today: I reviewed all medications, new labs and imaging results over the last 24 hours. I personally reviewed no images or EKG's today.    Physical Exam   Vital Signs: Temp: 97.4  F (36.3  C) Temp src: Oral BP: 137/86 Pulse: 73   Resp: 18 SpO2: 95 % O2 Device: High Flow Nasal Cannula (HFNC) Oxygen Delivery: 30 LPM  Weight: 156 lbs 15.48 oz  Constitutional: Awake, alert and in no distress. Sitting comfortably in up in chair.  Head: Normocephalic. Atraumatic.   Cardiovascular: Regular rate and rhythm. No murmurs, clicks, gallops or rubs. No edema  Respiratory: Clear to auscultation without wheezes or crackles. Normal respiratory rate and effort.   Gastrointestinal: Abdomen soft, non-tender. BS normal. No masses, organomegaly  Musculoskeletal: extremities normal- no gross deformities noted and normal muscle tone  Skin: redness on chin, no blistering- still improving  Psychiatric: mentation appears normal and affect normal/bright    Data   Recent Labs   Lab 10/08/21  1635 10/08/21  1224 10/08/21  0947 10/08/21  0820 10/07/21  1811 10/07/21  1727 10/06/21  1517 10/06/21  1007 10/05/21  0751 10/05/21  0318 10/04/21  1307 10/04/21  1144 10/04/21  0813 10/04/21  0432 10/02/21  1007 10/02/21  0528   WBC  --   --  22.4*  --   --  26.9*  --  22.0*   < > 20.9*  --   --    <  > 18.5*   < > 16.7*   HGB  --   --  11.1*  --   --  11.7*  --  11.5*   < > 10.4*  --   --    < > 10.0*   < > 10.6*   MCV  --   --  93  --   --  93  --  91   < > 92  --   --    < > 92   < > 90   PLT  --   --  211  --   --  230  --  217   < > 228  --   --    < > 218   < > 247   NA  --   --   --   --   --   --   --  138  --  140  --   --   --  140   < > 137   POTASSIUM  --   --   --   --   --   --   --  3.4  --  3.5  --  4.3   < > 3.2*   < > 3.5   CHLORIDE  --   --   --   --   --   --   --  104  --  106  --   --   --  107   < > 104   CO2  --   --   --   --   --   --   --  32  --  28  --   --   --  28   < > 28   BUN  --   --   --   --   --   --   --  17  --  17  --   --   --  17   < > 16   CR  --   --   --   --   --   --   --  0.66  --  0.69  --   --   --  0.69   < > 0.68   ANIONGAP  --   --   --   --   --   --   --  2*  --  6  --   --   --  5   < > 5   ERIK  --   --   --   --   --   --   --  9.6  --  8.8  --   --   --  8.6   < > 9.4   * 106*  --  89   < >  --    < > 85   < > 125*   < >  --    < > 124*   < > 90   ALBUMIN  --   --   --   --   --   --   --   --   --   --   --   --   --   --   --  2.6*   PROTTOTAL  --   --   --   --   --   --   --   --   --   --   --   --   --   --   --  6.0*   BILITOTAL  --   --   --   --   --   --   --   --   --   --   --   --   --   --   --  0.3   ALKPHOS  --   --   --   --   --   --   --   --   --   --   --   --   --   --   --  72   ALT  --   --   --   --   --   --   --   --   --   --   --   --   --   --   --  34   AST  --   --   --   --   --   --   --   --   --   --   --   --   --   --   --  13    < > = values in this interval not displayed.     Medications     - MEDICATION INSTRUCTIONS -       - MEDICATION INSTRUCTIONS -         acyclovir  400 mg Oral TID     amLODIPine  5 mg Oral Daily     azithromycin  250 mg Oral Daily     escitalopram  5 mg Oral QPM     fluticasone  1 puff Inhalation Daily     fluticasone  1 spray Both Nostrils Daily     heparin ANTICOAGULANT  5,000 Units  Subcutaneous Q12H     heparin lock flush  5-20 mL Intracatheter Q24H     insulin aspart   Subcutaneous TID AC     insulin aspart  1-7 Units Subcutaneous TID AC     insulin aspart  1-5 Units Subcutaneous At Bedtime     insulin glargine  6 Units Subcutaneous QAM AC     levothyroxine  25 mcg Oral QAM AC     melatonin  5 mg Oral At Bedtime     mycophenolate  1,500 mg Oral BID IS     pantoprazole  40 mg Oral BID AC     predniSONE  20 mg Oral Daily     sodium chloride (PF)  10-40 mL Intracatheter Q8H     sodium chloride (PF)  3 mL Intracatheter Q8H     sulfamethoxazole-trimethoprim  1 tablet Oral Once per day on Mon Wed Fri     vitamin D2  50,000 Units Oral Q7 Days

## 2021-10-08 NOTE — PLAN OF CARE
Assumed care of patient at 0700. A&Ox4. Denied pain. SR, rates 70-80s. Lungs clear with dim bases on HFNC, 40% FiO2 and 30LPM, ambulates in hallway on 15LPM via oxymask. On bipap at NOC. Desats with activity but recovers quickly. Up independently in room. Up in chair for meals. Adequate UOP via urinal, 1 BM. Regular diet with good appetite. ACHS blood sugars but not needing sliding scale insulin, only carb coverage. Family at bedside. Asymptomatic covid swab sent, result pending. Plan for patient to get outside this evening, will need 2 O2 tanks, pulse ox, and wheelchair. Continue with current plan of care.

## 2021-10-08 NOTE — PLAN OF CARE
Neuro: A&Ox4. Slept well on bipap  Cardiac: SR. VSS. Afebrile, no changes overnight   Respiratory: Sating mid 90's on bipap at 40% fio2.  GI/: Adequate urine output.  Diet/appetite: Tolerating regular diet.   Activity: Independant, up in room .  Pain: At acceptable level on current regimen. denies  Skin: Open areas in jawline on acyclovir  LDA's: TL Picc saline locked.     Plan: Pt slept well between cares on bipap at 40% fio2. Continue with POC. Notify primary team with changes.

## 2021-10-09 LAB
BACTERIA UR CULT: NO GROWTH
GLUCOSE BLDC GLUCOMTR-MCNC: 110 MG/DL (ref 70–99)
GLUCOSE BLDC GLUCOMTR-MCNC: 129 MG/DL (ref 70–99)
GLUCOSE BLDC GLUCOMTR-MCNC: 136 MG/DL (ref 70–99)
GLUCOSE BLDC GLUCOMTR-MCNC: 92 MG/DL (ref 70–99)
LACTATE SERPL-SCNC: 1.2 MMOL/L (ref 0.7–2)

## 2021-10-09 PROCEDURE — 250N000012 HC RX MED GY IP 250 OP 636 PS 637: Performed by: STUDENT IN AN ORGANIZED HEALTH CARE EDUCATION/TRAINING PROGRAM

## 2021-10-09 PROCEDURE — 250N000011 HC RX IP 250 OP 636: Performed by: INTERNAL MEDICINE

## 2021-10-09 PROCEDURE — 250N000013 HC RX MED GY IP 250 OP 250 PS 637: Performed by: DENTIST

## 2021-10-09 PROCEDURE — 250N000013 HC RX MED GY IP 250 OP 250 PS 637: Performed by: STUDENT IN AN ORGANIZED HEALTH CARE EDUCATION/TRAINING PROGRAM

## 2021-10-09 PROCEDURE — 250N000012 HC RX MED GY IP 250 OP 636 PS 637: Performed by: DENTIST

## 2021-10-09 PROCEDURE — 250N000011 HC RX IP 250 OP 636: Performed by: PHYSICIAN ASSISTANT

## 2021-10-09 PROCEDURE — 120N000003 HC R&B IMCU UMMC

## 2021-10-09 PROCEDURE — 99232 SBSQ HOSP IP/OBS MODERATE 35: CPT | Performed by: INTERNAL MEDICINE

## 2021-10-09 PROCEDURE — 83605 ASSAY OF LACTIC ACID: CPT | Performed by: INTERNAL MEDICINE

## 2021-10-09 PROCEDURE — 36592 COLLECT BLOOD FROM PICC: CPT | Performed by: INTERNAL MEDICINE

## 2021-10-09 PROCEDURE — 999N000157 HC STATISTIC RCP TIME EA 10 MIN

## 2021-10-09 PROCEDURE — 94660 CPAP INITIATION&MGMT: CPT

## 2021-10-09 PROCEDURE — 999N000007 HC SITE CHECK

## 2021-10-09 RX ADMIN — INSULIN ASPART 6 UNITS: 100 INJECTION, SOLUTION INTRAVENOUS; SUBCUTANEOUS at 14:36

## 2021-10-09 RX ADMIN — LEVOTHYROXINE SODIUM 25 MCG: 0.03 TABLET ORAL at 07:51

## 2021-10-09 RX ADMIN — AZITHROMYCIN MONOHYDRATE 250 MG: 250 TABLET ORAL at 07:51

## 2021-10-09 RX ADMIN — ACYCLOVIR 400 MG: 200 CAPSULE ORAL at 19:46

## 2021-10-09 RX ADMIN — INSULIN ASPART 5 UNITS: 100 INJECTION, SOLUTION INTRAVENOUS; SUBCUTANEOUS at 07:52

## 2021-10-09 RX ADMIN — Medication 5 MG: at 19:46

## 2021-10-09 RX ADMIN — PANTOPRAZOLE SODIUM 40 MG: 40 TABLET, DELAYED RELEASE ORAL at 07:51

## 2021-10-09 RX ADMIN — PANTOPRAZOLE SODIUM 40 MG: 40 TABLET, DELAYED RELEASE ORAL at 17:22

## 2021-10-09 RX ADMIN — INSULIN GLARGINE 6 UNITS: 100 INJECTION, SOLUTION SUBCUTANEOUS at 07:52

## 2021-10-09 RX ADMIN — ESCITALOPRAM OXALATE 5 MG: 5 TABLET, FILM COATED ORAL at 19:46

## 2021-10-09 RX ADMIN — MYCOPHENOLATE MOFETIL 1500 MG: 500 TABLET, FILM COATED ORAL at 07:51

## 2021-10-09 RX ADMIN — INSULIN ASPART 1 UNITS: 100 INJECTION, SOLUTION INTRAVENOUS; SUBCUTANEOUS at 19:46

## 2021-10-09 RX ADMIN — AMLODIPINE BESYLATE 5 MG: 5 TABLET ORAL at 07:51

## 2021-10-09 RX ADMIN — FLUTICASONE FUROATE 1 PUFF: 100 POWDER RESPIRATORY (INHALATION) at 07:51

## 2021-10-09 RX ADMIN — ACYCLOVIR 400 MG: 200 CAPSULE ORAL at 07:51

## 2021-10-09 RX ADMIN — SODIUM CHLORIDE, PRESERVATIVE FREE 5 ML: 5 INJECTION INTRAVENOUS at 17:22

## 2021-10-09 RX ADMIN — FLUTICASONE PROPIONATE 1 SPRAY: 50 SPRAY, METERED NASAL at 07:51

## 2021-10-09 RX ADMIN — HEPARIN SODIUM 5000 UNITS: 5000 INJECTION, SOLUTION INTRAVENOUS; SUBCUTANEOUS at 07:52

## 2021-10-09 RX ADMIN — MYCOPHENOLATE MOFETIL 1500 MG: 500 TABLET, FILM COATED ORAL at 17:22

## 2021-10-09 RX ADMIN — ACYCLOVIR 400 MG: 200 CAPSULE ORAL at 14:34

## 2021-10-09 RX ADMIN — HEPARIN SODIUM 5000 UNITS: 5000 INJECTION, SOLUTION INTRAVENOUS; SUBCUTANEOUS at 19:47

## 2021-10-09 RX ADMIN — PREDNISONE 20 MG: 20 TABLET ORAL at 07:51

## 2021-10-09 ASSESSMENT — ACTIVITIES OF DAILY LIVING (ADL)
ADLS_ACUITY_SCORE: 10
ADLS_ACUITY_SCORE: 10
ADLS_ACUITY_SCORE: 8
ADLS_ACUITY_SCORE: 8
ADLS_ACUITY_SCORE: 10
ADLS_ACUITY_SCORE: 8

## 2021-10-09 ASSESSMENT — MIFFLIN-ST. JEOR: SCORE: 1450

## 2021-10-09 NOTE — PLAN OF CARE
Neuro: A&Ox4. Flat affect.   Cardiac: SR 70-80s. VSS.   Respiratory: Switched to bipap 40% at 2320. On HFNC 40% during the day. LS clear/diminished. Back on high flow at 0615.  GI/: Adequate urine output. No BM overnight   Diet/appetite: Tolerating regular diet. Denied nausea  Activity:  Independent in room   Pain: At acceptable level on current regimen.   Skin: No new deficits noted.  LDA's: PICC.     Plan: Continue with POC. Notify primary team with changes.

## 2021-10-09 NOTE — PLAN OF CARE
Neuro: A&Ox4. Went outside for about 40 minutes to visit with family.   Cardiac: SR/ST low 100's. VSS. Afebrile  Respiratory: Sating > 90% 40% HFNC. MYERS   GI/: Adequate urine output.  Diet/appetite: Tolerating regular diet.   Activity: Independant, up in room .  Pain: At acceptable level on current regimen. denies  Skin: Open areas in jawline on acyclovir  LDA's: TL PICC hep locked.     Plan: Continue with POC. Notify primary team with changes.

## 2021-10-09 NOTE — PROGRESS NOTES
Patient reports feeling stable.  Is up walking around the hospital without difficulty.  Denies fevers, chills, productive cough, shortness of breath.    Oxygen requirements stable at 30L/40% HFNC.  Lactate improved.  Leukocytosis stably elevated.    Agree with ID that infection seems less likely at this time.    No new recommendations today from a pulmonary standpoint.  Pulmonary team will continue to follow.

## 2021-10-09 NOTE — PLAN OF CARE
Neuro: A&Ox4. Patient withdrawn, may need more assistance coping with extended hospital stay  Cardiac: SR. VSS.   Respiratory: No changes. Hi Flow 40%FiO2, needs more with activity, patient titrates himself  GI/: Adequate urine output. BM X1  Diet/appetite: Tolerating reg diet. Eating well. Carb cover  Activity:  ind, up to chair. Went outside with daughter for about 30 minutes on 15LOxymask  Pain: At acceptable level on current regimen.   Skin: No new deficits noted.  LDA's: PICC hep locked.     Plan: Continue with POC. Notify primary team with changes.

## 2021-10-09 NOTE — PROGRESS NOTES
[facility]    Medicine Progress Note - Hospitalist Service, Gold 10       Date of Admission:  9/5/2021    Assessment & Plan       Edson Thornton is a 56 year old male with history of NSIP associated with rheumatoid arthritis and traction bronchiectasis who was admitted on 9/5/2021 with acute on chronic hypoxic respiratory failure likely from ILD. Weaning steroids now in preparation for transplant listing. 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    Today:  -Last day of acyclovir today    Acute on chronic hypoxic respiratory failure  secondary to progressive ILD  Although the patient had tachypnea and leukocytosis on 9/16/2021, sepsis has been effectively ruled out.  2 view chest x-ray showed improvement in the patient's known interstitial lung disease.  -Appreciate pulm consult  -Continue BiPAP at night, HFNC during day  -Continue azithromycin as an anti-inflammatory  -Continue prednisone 20 mg daily (no plans for further taper)  -Continue MMF 1500 mg twice daily, per pulmonary  -IV diuresis 10/5 due to rising weight--no further diuresis 10/6-10/9      Lung transplant candidacy  -Perimolar abscess excision completed on 9/22/2021 in OR by dentistry. Discussed with dental team 9/24, no indication for antibiotics post tooth extraction   -Although the patient had indeterminate quanteferon gold, this was deemed to be low risk for active or latent tuberculosis based on evaluation of transplant infectious disease.  -The patient received ivermectin at 15 mg X1 dose for treatment of possible stronglydies based on recommendations of transplant ID  -Negative urinary coccidoses antigen   -The patient will need monthly coccidoses antigen for 12 months after transplant  - When transplanted please send, in addition to the lungs, a mediastinal LN for pathology, fungal cx and AFB smear and cx.   -CT chest w/o contrast with slightly improved groundglass opacities (but  still quite prominent)  -The patient will likely need to follow up with ID as an outpatient within 2 months of lung transplant to follow up on the urinary Cocci Ag and the LN and lungs biopsies.   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  Physical exam with chronic crusted over lesions throughout lower jaw.  Consulted to dermatology and jaw lesions HSV 2+ with initiation of acyclovir.  Preliminary plan to continue 3 times daily acyclovir for 10 days.  May need longer depending on immunosuppression/resolution of lesions.  -Consult dermatology, appreciate recs  -Continue acyclovir 400 mg 3 times daily x10 days (started 9/30--last day 10/9)  -No current need for precautions (not disseminated) at this time     Elevated Lactate - intermittent, resolved  Intermittently found to have elevated lactate after triggering sepsis due to WBC and HR. Usually resolved with minimal/no interventions (I.e. small fluid bolus). Unclear etiology- suspect due to high metabolic stress due to hypoxia vs spurrious from blood draw. Low suspicion for sepsis  -Continue to monitor clinically     Moderate (Non-severe) malnutrition in the context of chronic illness, all are POA  Malnutrition:    - Level of malnutrition: Non-Severe   - Based on: weight loss, mild (or greater) muscle loss  -Nutrition consulted  -Calorie counts, trend weights      Thrombocytopenia, resolved  Anemia- likely anemia of chronic disease. Baseline around 10-- where he has been while hospitalized.    Leukocytosis -waxing/waning. No other signs of acute infection. Continuing to monitor but suspect due to stress response, steroids. As above-- spoke again with ID on 10/8 and not thought to be missing  infection. Inflammatory marker trend is mild and is without fever/chills, increased dyspnea, diarrhea, vomiting, abdominal pain, chest pain, rash, joint pain.  --Cultures obtained, trending inflammatory markers  -Blood cultures NGTD  -Urine culture NGTD     Vitamin D deficiency, POA  -Continue vitamin D replacement     Rheumatoid arthritis  Patient seen by rheumatology this admission. No acute flare of rheumatoid arthritis. SINCERE antibodies negative, flow cytometry negative for CD19 cells, CK normal, aldolase normal. Anti-Irene-1 negative. The patient was treated with leflunomide from 5/21 to 7/21. Rheumatology considers that leflunomide toxicity might contribute to the patient's ILD.   -Continue cholestyramine TID to increase leflunomide clearance.      Steroid induced diabetes  -Continue insulin - decreased glargine to 6 units 10/5 due to borderline hypoglycemia  -Continue insulin NovoLog at medium insulin resistance     Chronic medical problems  Hypothyroidism   Continue 25 mg levothyroxine qday     SALTY  Continue BiPAP at night.      Hypertension  On 5mg amlodipine, BP well controlled .     Anxiety  Depression  Continue 5mg escitalopram         Diet: Snacks/Supplements Adult: Other; pt may order snacks/supplements PRN; Between Meals  Snacks/Supplements Adult: Ensure Enlive; With Meals  Regular Diet Adult    DVT Prophylaxis: Heparin SQ  Watson Catheter: Not present  Central Lines: None  Code Status: Full Code      Disposition Plan   Expected discharge: 10/14/2021   recommended to TBD once transplant occurs and patient recovers.     The patient's care was discussed with the Patient, Patient's Family and pulm transplant team Consultant.    Shauna Juarez MD  Hospitalist Service, [address]  Securely message with the Vocera Web Console (learn more here)  Text page via [facility] Paging/Directory  Please see sign in/sign out for up to date coverage information    Clinically  Significant Risk Factors Present on Admission                ______________________________________________________________________    Interval History   No events overnight. Did have mildly elevated HR in the evening but felt similar to usual. Breathing is feeling okay. No swelling in legs. Stools are soft, formed not liquid. No abd pain. No new rashes. No fevers. Daughter at bedside.     Data reviewed today: I reviewed all medications, new labs and imaging results over the last 24 hours. I personally reviewed no images or EKG's today.    Physical Exam   Vital Signs: Temp: 98.2  F (36.8  C) Temp src: Oral BP: (!) 141/77 Pulse: 73   Resp: 16 SpO2: 96 % O2 Device: High Flow Nasal Cannula (HFNC) Oxygen Delivery: 30 LPM  Weight: 156 lbs 4.9 oz  Constitutional: Awake, alert and in no distress. Sitting comfortably in bed  Head: Normocephalic. Atraumatic.   EENT: erythema with lack of hair on left side of chin appears similar to yesterday- no worse and no vesicles. HFNC in place in nose  Cardiovascular: Regular rate and rhythm. No murmurs, clicks, gallops or rubs. No edema Respiratory: Clear to auscultation without wheezes or crackles. Normal respiratory rate and effort.   Gastrointestinal: Abdomen soft, non-tender. BS normal.   Musculoskeletal: extremities normal- no gross deformities noted      Data   Recent Labs   Lab 10/09/21  1341 10/09/21  0702 10/08/21  2139 10/08/21  1224 10/08/21  0947 10/07/21  1811 10/07/21  1727 10/06/21  1517 10/06/21  1007 10/05/21  0751 10/05/21  0318 10/04/21  1307 10/04/21  1144 10/04/21  0813 10/04/21  0432   WBC  --   --   --   --  22.4*  --  26.9*  --  22.0*   < > 20.9*  --   --    < > 18.5*   HGB  --   --   --   --  11.1*  --  11.7*  --  11.5*   < > 10.4*  --   --    < > 10.0*   MCV  --   --   --   --  93  --  93  --  91   < > 92  --   --    < > 92   PLT  --   --   --   --  211  --  230  --  217   < > 228  --   --    < > 218   NA  --   --   --   --   --   --   --   --  138  --  140   --   --   --  140   POTASSIUM  --   --   --   --   --   --   --   --  3.4  --  3.5  --  4.3   < > 3.2*   CHLORIDE  --   --   --   --   --   --   --   --  104  --  106  --   --   --  107   CO2  --   --   --   --   --   --   --   --  32  --  28  --   --   --  28   BUN  --   --   --   --   --   --   --   --  17  --  17  --   --   --  17   CR  --   --   --   --   --   --   --   --  0.66  --  0.69  --   --   --  0.69   ANIONGAP  --   --   --   --   --   --   --   --  2*  --  6  --   --   --  5   ERIK  --   --   --   --   --   --   --   --  9.6  --  8.8  --   --   --  8.6   * 92 101*   < >  --    < >  --    < > 85   < > 125*   < >  --    < > 124*    < > = values in this interval not displayed.     Medications     - MEDICATION INSTRUCTIONS -       - MEDICATION INSTRUCTIONS -         acyclovir  400 mg Oral TID     amLODIPine  5 mg Oral Daily     azithromycin  250 mg Oral Daily     escitalopram  5 mg Oral QPM     fluticasone  1 puff Inhalation Daily     fluticasone  1 spray Both Nostrils Daily     heparin ANTICOAGULANT  5,000 Units Subcutaneous Q12H     heparin lock flush  5-20 mL Intracatheter Q24H     insulin aspart   Subcutaneous TID AC     insulin aspart  1-7 Units Subcutaneous TID AC     insulin aspart  1-5 Units Subcutaneous At Bedtime     insulin glargine  6 Units Subcutaneous QAM AC     levothyroxine  25 mcg Oral QAM AC     melatonin  5 mg Oral At Bedtime     mycophenolate  1,500 mg Oral BID IS     pantoprazole  40 mg Oral BID AC     predniSONE  20 mg Oral Daily     sodium chloride (PF)  10-40 mL Intracatheter Q8H     sodium chloride (PF)  3 mL Intracatheter Q8H     sulfamethoxazole-trimethoprim  1 tablet Oral Once per day on Mon Wed Fri     vitamin D2  50,000 Units Oral Q7 Days

## 2021-10-10 LAB
ANION GAP SERPL CALCULATED.3IONS-SCNC: 6 MMOL/L (ref 3–14)
BASOPHILS # BLD MANUAL: 0 10E3/UL (ref 0–0.2)
BASOPHILS NFR BLD MANUAL: 0 %
BUN SERPL-MCNC: 19 MG/DL (ref 7–30)
CALCIUM SERPL-MCNC: 9.6 MG/DL (ref 8.5–10.1)
CHLORIDE BLD-SCNC: 104 MMOL/L (ref 94–109)
CO2 SERPL-SCNC: 29 MMOL/L (ref 20–32)
CREAT SERPL-MCNC: 0.69 MG/DL (ref 0.66–1.25)
CRP SERPL-MCNC: 30 MG/L (ref 0–8)
EOSINOPHIL # BLD MANUAL: 0 10E3/UL (ref 0–0.7)
EOSINOPHIL NFR BLD MANUAL: 0 %
ERYTHROCYTE [DISTWIDTH] IN BLOOD BY AUTOMATED COUNT: 17.2 % (ref 10–15)
GFR SERPL CREATININE-BSD FRML MDRD: >90 ML/MIN/1.73M2
GLUCOSE BLD-MCNC: 106 MG/DL (ref 70–99)
GLUCOSE BLDC GLUCOMTR-MCNC: 110 MG/DL (ref 70–99)
GLUCOSE BLDC GLUCOMTR-MCNC: 151 MG/DL (ref 70–99)
GLUCOSE BLDC GLUCOMTR-MCNC: 160 MG/DL (ref 70–99)
GLUCOSE BLDC GLUCOMTR-MCNC: 97 MG/DL (ref 70–99)
HCT VFR BLD AUTO: 36.2 % (ref 40–53)
HGB BLD-MCNC: 11.6 G/DL (ref 13.3–17.7)
LACTATE SERPL-SCNC: 1 MMOL/L (ref 0.7–2)
LYMPHOCYTES # BLD MANUAL: 3.4 10E3/UL (ref 0.8–5.3)
LYMPHOCYTES NFR BLD MANUAL: 13 %
MCH RBC QN AUTO: 29.7 PG (ref 26.5–33)
MCHC RBC AUTO-ENTMCNC: 32 G/DL (ref 31.5–36.5)
MCV RBC AUTO: 93 FL (ref 78–100)
METAMYELOCYTES # BLD MANUAL: 0.3 10E3/UL
METAMYELOCYTES NFR BLD MANUAL: 1 %
MONOCYTES # BLD MANUAL: 1.6 10E3/UL (ref 0–1.3)
MONOCYTES NFR BLD MANUAL: 6 %
MYELOCYTES # BLD MANUAL: 0.3 10E3/UL
MYELOCYTES NFR BLD MANUAL: 1 %
NEUTROPHILS # BLD MANUAL: 20.9 10E3/UL (ref 1.6–8.3)
NEUTROPHILS NFR BLD MANUAL: 79 %
PLAT MORPH BLD: ABNORMAL
PLATELET # BLD AUTO: 221 10E3/UL (ref 150–450)
POTASSIUM BLD-SCNC: 3.6 MMOL/L (ref 3.4–5.3)
RBC # BLD AUTO: 3.9 10E6/UL (ref 4.4–5.9)
RBC MORPH BLD: ABNORMAL
SODIUM SERPL-SCNC: 139 MMOL/L (ref 133–144)
WBC # BLD AUTO: 26.5 10E3/UL (ref 4–11)

## 2021-10-10 PROCEDURE — 250N000011 HC RX IP 250 OP 636: Performed by: PHYSICIAN ASSISTANT

## 2021-10-10 PROCEDURE — 250N000013 HC RX MED GY IP 250 OP 250 PS 637: Performed by: STUDENT IN AN ORGANIZED HEALTH CARE EDUCATION/TRAINING PROGRAM

## 2021-10-10 PROCEDURE — 80048 BASIC METABOLIC PNL TOTAL CA: CPT | Performed by: INTERNAL MEDICINE

## 2021-10-10 PROCEDURE — 94660 CPAP INITIATION&MGMT: CPT

## 2021-10-10 PROCEDURE — 85027 COMPLETE CBC AUTOMATED: CPT | Performed by: INTERNAL MEDICINE

## 2021-10-10 PROCEDURE — 250N000013 HC RX MED GY IP 250 OP 250 PS 637: Performed by: DENTIST

## 2021-10-10 PROCEDURE — 86140 C-REACTIVE PROTEIN: CPT | Performed by: INTERNAL MEDICINE

## 2021-10-10 PROCEDURE — 250N000011 HC RX IP 250 OP 636: Performed by: INTERNAL MEDICINE

## 2021-10-10 PROCEDURE — 120N000003 HC R&B IMCU UMMC

## 2021-10-10 PROCEDURE — 83605 ASSAY OF LACTIC ACID: CPT | Performed by: INTERNAL MEDICINE

## 2021-10-10 PROCEDURE — 250N000012 HC RX MED GY IP 250 OP 636 PS 637: Performed by: STUDENT IN AN ORGANIZED HEALTH CARE EDUCATION/TRAINING PROGRAM

## 2021-10-10 PROCEDURE — 999N000157 HC STATISTIC RCP TIME EA 10 MIN

## 2021-10-10 PROCEDURE — 36592 COLLECT BLOOD FROM PICC: CPT | Performed by: INTERNAL MEDICINE

## 2021-10-10 PROCEDURE — 250N000012 HC RX MED GY IP 250 OP 636 PS 637: Performed by: DENTIST

## 2021-10-10 PROCEDURE — 99232 SBSQ HOSP IP/OBS MODERATE 35: CPT | Performed by: INTERNAL MEDICINE

## 2021-10-10 RX ORDER — FUROSEMIDE 10 MG/ML
20 INJECTION INTRAMUSCULAR; INTRAVENOUS ONCE
Status: COMPLETED | OUTPATIENT
Start: 2021-10-10 | End: 2021-10-10

## 2021-10-10 RX ADMIN — INSULIN ASPART 1 UNITS: 100 INJECTION, SOLUTION INTRAVENOUS; SUBCUTANEOUS at 20:19

## 2021-10-10 RX ADMIN — INSULIN GLARGINE 6 UNITS: 100 INJECTION, SOLUTION SUBCUTANEOUS at 08:43

## 2021-10-10 RX ADMIN — MYCOPHENOLATE MOFETIL 1500 MG: 500 TABLET, FILM COATED ORAL at 08:43

## 2021-10-10 RX ADMIN — PANTOPRAZOLE SODIUM 40 MG: 40 TABLET, DELAYED RELEASE ORAL at 08:44

## 2021-10-10 RX ADMIN — INSULIN ASPART 5 UNITS: 100 INJECTION, SOLUTION INTRAVENOUS; SUBCUTANEOUS at 08:48

## 2021-10-10 RX ADMIN — MYCOPHENOLATE MOFETIL 1500 MG: 500 TABLET, FILM COATED ORAL at 17:32

## 2021-10-10 RX ADMIN — INSULIN ASPART 6 UNITS: 100 INJECTION, SOLUTION INTRAVENOUS; SUBCUTANEOUS at 13:47

## 2021-10-10 RX ADMIN — Medication 5 MG: at 20:19

## 2021-10-10 RX ADMIN — SODIUM CHLORIDE, PRESERVATIVE FREE 5 ML: 5 INJECTION INTRAVENOUS at 15:09

## 2021-10-10 RX ADMIN — FUROSEMIDE 20 MG: 10 INJECTION, SOLUTION INTRAVENOUS at 15:09

## 2021-10-10 RX ADMIN — FLUTICASONE FUROATE 1 PUFF: 100 POWDER RESPIRATORY (INHALATION) at 08:45

## 2021-10-10 RX ADMIN — HEPARIN SODIUM 5000 UNITS: 5000 INJECTION, SOLUTION INTRAVENOUS; SUBCUTANEOUS at 08:44

## 2021-10-10 RX ADMIN — HEPARIN SODIUM 5000 UNITS: 5000 INJECTION, SOLUTION INTRAVENOUS; SUBCUTANEOUS at 20:19

## 2021-10-10 RX ADMIN — SODIUM CHLORIDE, PRESERVATIVE FREE 5 ML: 5 INJECTION INTRAVENOUS at 10:19

## 2021-10-10 RX ADMIN — ESCITALOPRAM OXALATE 5 MG: 5 TABLET, FILM COATED ORAL at 20:19

## 2021-10-10 RX ADMIN — AZITHROMYCIN MONOHYDRATE 250 MG: 250 TABLET ORAL at 08:44

## 2021-10-10 RX ADMIN — FLUTICASONE PROPIONATE 1 SPRAY: 50 SPRAY, METERED NASAL at 08:44

## 2021-10-10 RX ADMIN — LEVOTHYROXINE SODIUM 25 MCG: 0.03 TABLET ORAL at 08:44

## 2021-10-10 RX ADMIN — AMLODIPINE BESYLATE 5 MG: 5 TABLET ORAL at 08:44

## 2021-10-10 RX ADMIN — PREDNISONE 20 MG: 20 TABLET ORAL at 08:44

## 2021-10-10 RX ADMIN — PANTOPRAZOLE SODIUM 40 MG: 40 TABLET, DELAYED RELEASE ORAL at 17:32

## 2021-10-10 RX ADMIN — ACYCLOVIR 400 MG: 200 CAPSULE ORAL at 08:44

## 2021-10-10 ASSESSMENT — ACTIVITIES OF DAILY LIVING (ADL)
ADLS_ACUITY_SCORE: 10

## 2021-10-10 ASSESSMENT — MIFFLIN-ST. JEOR: SCORE: 1444

## 2021-10-10 NOTE — PLAN OF CARE
Neuro: A&Ox4.   Cardiac: SR 80-90. Tachy with activity. VSS   Respiratory: HFNC 40% FiO2 during the day. Bipap at night 40%  GI/: Adequate urine output. BM X1  Diet/appetite: Tolerating regular diet. Eating well. Carb coverage given for dinner intake  Activity:  Independent in room   Pain: At acceptable level on current regimen.   Skin: No new deficits noted.  LDA's: 3 lumen PICC.     Plan: Continue with POC. Notify primary team with changes.

## 2021-10-10 NOTE — PROGRESS NOTES
[facility]    Medicine Progress Note - Hospitalist Service, Gold 10       Date of Admission:  9/5/2021    Assessment & Plan          Edson Thornton is a 56 year old male with history of NSIP associated with rheumatoid arthritis and traction bronchiectasis who was admitted on 9/5/2021 with acute on chronic hypoxic respiratory failure likely from ILD. Weaning steroids now in preparation for transplant listing. 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    Today:  -IV diuresis x 1  -Offered  or health psychology consult and patient declined    Acute on chronic hypoxic respiratory failure  secondary to progressive ILD  Although the patient had tachypnea and leukocytosis on 9/16/2021, sepsis has been effectively ruled out.  2 view chest x-ray showed improvement in the patient's known interstitial lung disease.  -Appreciate pulm consult  -Continue BiPAP at night, HFNC during day  -Continue azithromycin as an anti-inflammatory  -Continue prednisone 20 mg daily (no plans for further taper)  -Continue MMF 1500 mg twice daily, per pulmonary  -IV diuresis 10/5 due to rising weight--no further diuresis 10/6-10/9      Lung transplant candidacy  -Perimolar abscess excision completed on 9/22/2021 in OR by dentistry. Discussed with dental team 9/24, no indication for antibiotics post tooth extraction   -Although the patient had indeterminate quanteferon gold, this was deemed to be low risk for active or latent tuberculosis based on evaluation of transplant infectious disease.  -The patient received ivermectin at 15 mg X1 dose for treatment of possible stronglydies based on recommendations of transplant ID  -Negative urinary coccidoses antigen   -The patient will need monthly coccidoses antigen for 12 months after transplant  - When transplanted please send, in addition to the lungs, a mediastinal LN for pathology, fungal cx and AFB smear and cx.   -CT chest w/o  contrast with slightly improved groundglass opacities (but still quite prominent)  -The patient will likely need to follow up with ID as an outpatient within 2 months of lung transplant to follow up on the urinary Cocci Ag and the LN and lungs biopsies.   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  Physical exam with chronic crusted over lesions throughout lower jaw.  Consulted to dermatology and jaw lesions HSV 2+ with initiation of acyclovir.  Preliminary plan to continue 3 times daily acyclovir for 10 days.  May need longer depending on immunosuppression/resolution of lesions.  -Consult dermatology, appreciate recs  -Continue acyclovir 400 mg 3 times daily x10 days completed 10/9   -No current need for precautions (not disseminated) at this time     Elevated Lactate - intermittent, resolved  Intermittently found to have elevated lactate after triggering sepsis due to WBC and HR. Usually resolved with minimal/no interventions (I.e. small fluid bolus). Unclear etiology- suspect due to high metabolic stress due to hypoxia vs spurrious from blood draw. Low suspicion for sepsis  -Continue to monitor clinically     Moderate (Non-severe) malnutrition in the context of chronic illness, all are POA  Malnutrition:    - Level of malnutrition: Non-Severe   - Based on: weight loss, mild (or greater) muscle loss  -Nutrition consulted  -Calorie counts, trend weights      Thrombocytopenia, resolved  Anemia- likely anemia of chronic disease. Baseline around 10-- where he has been while hospitalized.    Leukocytosis -waxing/waning. No other signs of acute infection. Continuing to monitor but suspect due to stress response, steroids. As above-- spoke again with ID on  10/8 and not thought to be missing infection. Inflammatory marker trend is mild and is without fever/chills, increased dyspnea, diarrhea, vomiting, abdominal pain, chest pain, rash, joint pain.  --Cultures obtained, trending inflammatory markers  -Blood cultures NGTD  -Urine culture NGTD     Vitamin D deficiency, POA  -Continue vitamin D replacement     Rheumatoid arthritis  Patient seen by rheumatology this admission. No acute flare of rheumatoid arthritis. SINCERE antibodies negative, flow cytometry negative for CD19 cells, CK normal, aldolase normal. Anti-Irene-1 negative. The patient was treated with leflunomide from 5/21 to 7/21. Rheumatology considers that leflunomide toxicity might contribute to the patient's ILD.   -Continue cholestyramine TID to increase leflunomide clearance.      Steroid induced diabetes  -Continue insulin - decreased glargine to 6 units 10/5 due to borderline hypoglycemia  -Continue insulin NovoLog at medium insulin resistance     Chronic medical problems  Hypothyroidism   Continue 25 mg levothyroxine qday     SALTY  Continue BiPAP at night.      Hypertension  On 5mg amlodipine, BP well controlled .     Anxiety  Depression  Continue 5mg escitalopram       Diet: Snacks/Supplements Adult: Other; pt may order snacks/supplements PRN; Between Meals  Snacks/Supplements Adult: Ensure Enlive; With Meals  Regular Diet Adult    DVT Prophylaxis: Heparin SQ  Watson Catheter: Not present  Central Lines: None  Code Status: Full Code      Disposition Plan   Expected discharge: 10/14/2021   recommended to TBD once has lung transplant, recovers.     The patient's care was discussed with the Patient and Pulmonary transplant Consultant.    Shauna Juarez MD  Hospitalist Service, [address]  Securely message with the Vocera Web Console (learn more here)  Text page via [employer] Paging/Directory  Please see sign in/sign out for up to date coverage  information    Clinically Significant Risk Factors Present on Admission                ______________________________________________________________________    Interval History   No events overnight. Family has been visiting. He describes a lot of stress with fiance and family but doesn't go into details. No fever or change in his breathing. No diarrhea. No abdominal pain. Eating/drinking. Okay with diuresis. Declines offer for extra support people    Data reviewed today: I reviewed all medications, new labs and imaging results over the last 24 hours. I personally reviewed no images or EKG's today.    Physical Exam   Vital Signs: Temp: 97.9  F (36.6  C) Temp src: Axillary BP: 127/79 Pulse: 77   Resp: 18 SpO2: 98 % O2 Device: High Flow Nasal Cannula (HFNC) Oxygen Delivery: 30 LPM  Weight: 154 lbs 15.73 oz  Constitutional: Awake, alert and in no distress. Sitting up comfortably in bed   Head: Normocephalic. Atraumatic.   Cardiovascular: Regular rate and rhythm. No murmurs, clicks, gallops or rubs. Trace peripheral edema  Respiratory: Faint anterior and posterior crackles. No significant wheezing. No retractions.  Musculoskeletal: extremities normal- no gross deformities noted and normal muscle tone  Skin: small amt of erythema on chin - no vesicles  Psychiatric: appears stressed    Data   Recent Labs   Lab 10/10/21  1201 10/10/21  1024 10/10/21  0732 10/08/21  1224 10/08/21  0947 10/07/21  1811 10/07/21  1727 10/06/21  1517 10/06/21  1007 10/05/21  0751 10/05/21  0318   WBC  --  26.5*  --   --  22.4*  --  26.9*  --  22.0*   < > 20.9*   HGB  --  11.6*  --   --  11.1*  --  11.7*  --  11.5*   < > 10.4*   MCV  --  93  --   --  93  --  93  --  91   < > 92   PLT  --  221  --   --  211  --  230  --  217   < > 228   NA  --  139  --   --   --   --   --   --  138  --  140   POTASSIUM  --  3.6  --   --   --   --   --   --  3.4  --  3.5   CHLORIDE  --  104  --   --   --   --   --   --  104  --  106   CO2  --  29  --   --   --    --   --   --  32  --  28   BUN  --  19  --   --   --   --   --   --  17  --  17   CR  --  0.69  --   --   --   --   --   --  0.66  --  0.69   ANIONGAP  --  6  --   --   --   --   --   --  2*  --  6   ERIK  --  9.6  --   --   --   --   --   --  9.6  --  8.8   * 106* 97   < >  --    < >  --    < > 85   < > 125*    < > = values in this interval not displayed.     Medications     - MEDICATION INSTRUCTIONS -       - MEDICATION INSTRUCTIONS -         amLODIPine  5 mg Oral Daily     azithromycin  250 mg Oral Daily     escitalopram  5 mg Oral QPM     fluticasone  1 puff Inhalation Daily     fluticasone  1 spray Both Nostrils Daily     heparin ANTICOAGULANT  5,000 Units Subcutaneous Q12H     heparin lock flush  5-20 mL Intracatheter Q24H     insulin aspart   Subcutaneous TID AC     insulin aspart  1-7 Units Subcutaneous TID AC     insulin aspart  1-5 Units Subcutaneous At Bedtime     insulin glargine  6 Units Subcutaneous QAM AC     levothyroxine  25 mcg Oral QAM AC     melatonin  5 mg Oral At Bedtime     mycophenolate  1,500 mg Oral BID IS     pantoprazole  40 mg Oral BID AC     predniSONE  20 mg Oral Daily     sodium chloride (PF)  10-40 mL Intracatheter Q8H     sodium chloride (PF)  3 mL Intracatheter Q8H     sulfamethoxazole-trimethoprim  1 tablet Oral Once per day on Mon Wed Fri     vitamin D2  50,000 Units Oral Q7 Days

## 2021-10-10 NOTE — PLAN OF CARE
Neuro: A&Ox4.   Cardiac: ST up to 1-teens, VSS.   Respiratory: Sating 93 on Hi Flow 40% FiO2  GI/: Adequate urine output. One dose lasix 20mg given, BM X1  Diet/appetite: Tolerating reg diet. Eating well.  Activity:  indp in room. Able to go outside with family for last day before they go back to [place]  Pain: At acceptable level on current regimen.   Skin: No new deficits noted.  LDA's: R PICC hep locked    Plan: Continue with POC. Notify primary team with changes.

## 2021-10-11 ENCOUNTER — APPOINTMENT (OUTPATIENT)
Dept: PHYSICAL THERAPY | Facility: CLINIC | Age: 56
End: 2021-10-11
Attending: STUDENT IN AN ORGANIZED HEALTH CARE EDUCATION/TRAINING PROGRAM
Payer: COMMERCIAL

## 2021-10-11 LAB
GLUCOSE BLDC GLUCOMTR-MCNC: 115 MG/DL (ref 70–99)
GLUCOSE BLDC GLUCOMTR-MCNC: 136 MG/DL (ref 70–99)
GLUCOSE BLDC GLUCOMTR-MCNC: 168 MG/DL (ref 70–99)
GLUCOSE BLDC GLUCOMTR-MCNC: 90 MG/DL (ref 70–99)

## 2021-10-11 PROCEDURE — 250N000012 HC RX MED GY IP 250 OP 636 PS 637: Performed by: DENTIST

## 2021-10-11 PROCEDURE — 120N000003 HC R&B IMCU UMMC

## 2021-10-11 PROCEDURE — 999N000215 HC STATISTIC HFNC ADULT NON-CPAP

## 2021-10-11 PROCEDURE — 250N000012 HC RX MED GY IP 250 OP 636 PS 637: Performed by: STUDENT IN AN ORGANIZED HEALTH CARE EDUCATION/TRAINING PROGRAM

## 2021-10-11 PROCEDURE — 99232 SBSQ HOSP IP/OBS MODERATE 35: CPT | Performed by: INTERNAL MEDICINE

## 2021-10-11 PROCEDURE — 250N000011 HC RX IP 250 OP 636: Performed by: PHYSICIAN ASSISTANT

## 2021-10-11 PROCEDURE — 250N000013 HC RX MED GY IP 250 OP 250 PS 637: Performed by: DENTIST

## 2021-10-11 PROCEDURE — 999N000157 HC STATISTIC RCP TIME EA 10 MIN

## 2021-10-11 PROCEDURE — 250N000013 HC RX MED GY IP 250 OP 250 PS 637: Performed by: INTERNAL MEDICINE

## 2021-10-11 PROCEDURE — 94660 CPAP INITIATION&MGMT: CPT

## 2021-10-11 PROCEDURE — 97110 THERAPEUTIC EXERCISES: CPT | Mod: GP | Performed by: PHYSICAL THERAPIST

## 2021-10-11 PROCEDURE — 999N000007 HC SITE CHECK

## 2021-10-11 PROCEDURE — 250N000011 HC RX IP 250 OP 636: Performed by: INTERNAL MEDICINE

## 2021-10-11 RX ORDER — ESCITALOPRAM OXALATE 10 MG/1
10 TABLET ORAL EVERY EVENING
Status: DISCONTINUED | OUTPATIENT
Start: 2021-10-11 | End: 2021-10-16

## 2021-10-11 RX ADMIN — PANTOPRAZOLE SODIUM 40 MG: 40 TABLET, DELAYED RELEASE ORAL at 07:49

## 2021-10-11 RX ADMIN — HEPARIN SODIUM 5000 UNITS: 5000 INJECTION, SOLUTION INTRAVENOUS; SUBCUTANEOUS at 19:39

## 2021-10-11 RX ADMIN — FLUTICASONE FUROATE 1 PUFF: 100 POWDER RESPIRATORY (INHALATION) at 07:49

## 2021-10-11 RX ADMIN — INSULIN ASPART 8 UNITS: 100 INJECTION, SOLUTION INTRAVENOUS; SUBCUTANEOUS at 09:50

## 2021-10-11 RX ADMIN — SODIUM CHLORIDE, PRESERVATIVE FREE 10 ML: 5 INJECTION INTRAVENOUS at 15:12

## 2021-10-11 RX ADMIN — INSULIN ASPART 9 UNITS: 100 INJECTION, SOLUTION INTRAVENOUS; SUBCUTANEOUS at 19:37

## 2021-10-11 RX ADMIN — LEVOTHYROXINE SODIUM 25 MCG: 0.03 TABLET ORAL at 07:49

## 2021-10-11 RX ADMIN — PANTOPRAZOLE SODIUM 40 MG: 40 TABLET, DELAYED RELEASE ORAL at 15:37

## 2021-10-11 RX ADMIN — FLUTICASONE PROPIONATE 1 SPRAY: 50 SPRAY, METERED NASAL at 07:49

## 2021-10-11 RX ADMIN — MYCOPHENOLATE MOFETIL 1500 MG: 500 TABLET, FILM COATED ORAL at 07:49

## 2021-10-11 RX ADMIN — Medication 5 MG: at 19:39

## 2021-10-11 RX ADMIN — HEPARIN SODIUM 5000 UNITS: 5000 INJECTION, SOLUTION INTRAVENOUS; SUBCUTANEOUS at 07:49

## 2021-10-11 RX ADMIN — ESCITALOPRAM OXALATE 10 MG: 5 TABLET, FILM COATED ORAL at 19:39

## 2021-10-11 RX ADMIN — AZITHROMYCIN MONOHYDRATE 250 MG: 250 TABLET ORAL at 07:49

## 2021-10-11 RX ADMIN — PREDNISONE 20 MG: 20 TABLET ORAL at 07:49

## 2021-10-11 RX ADMIN — SODIUM CHLORIDE, PRESERVATIVE FREE 5 ML: 5 INJECTION INTRAVENOUS at 23:16

## 2021-10-11 RX ADMIN — MYCOPHENOLATE MOFETIL 1500 MG: 500 TABLET, FILM COATED ORAL at 17:30

## 2021-10-11 RX ADMIN — INSULIN ASPART 5 UNITS: 100 INJECTION, SOLUTION INTRAVENOUS; SUBCUTANEOUS at 15:37

## 2021-10-11 RX ADMIN — INSULIN GLARGINE 6 UNITS: 100 INJECTION, SOLUTION SUBCUTANEOUS at 07:49

## 2021-10-11 RX ADMIN — AMLODIPINE BESYLATE 5 MG: 5 TABLET ORAL at 07:49

## 2021-10-11 RX ADMIN — SULFAMETHOXAZOLE AND TRIMETHOPRIM 1 TABLET: 800; 160 TABLET ORAL at 07:49

## 2021-10-11 ASSESSMENT — ACTIVITIES OF DAILY LIVING (ADL)
ADLS_ACUITY_SCORE: 10
ADLS_ACUITY_SCORE: 8
ADLS_ACUITY_SCORE: 8
ADLS_ACUITY_SCORE: 10

## 2021-10-11 ASSESSMENT — MIFFLIN-ST. JEOR: SCORE: 1441

## 2021-10-11 NOTE — PLAN OF CARE
A: A&Ox4, calm & cooperative with cares, pleasant. VS unchanged on HFNC with FiO2 @ 40% sating >92%, BiPAP at night. SR-ST . Afebrile. Denies pain and nausea. Regular diet, carb coverage with meals. No new skin deficits noted. Voiding via urinal. 1 continent BM today. Daughter at bedside, supportive. Worked with therapy. Able to make needs known via call light.     I/O this shift:  In: 360 [P.O.:360]  Out: -     Temp:  [97.6  F (36.4  C)-98.9  F (37.2  C)] 98.1  F (36.7  C)  Pulse:  [] 96  Resp:  [20-22] 20  BP: (130-148)/(72-98) 136/85  FiO2 (%):  [40 %-65 %] 40 %  SpO2:  [93 %-97 %] 97 %     R: Continue with POC. Notify primary team with changes.

## 2021-10-11 NOTE — PLAN OF CARE
Neuro: A&Ox4. Calls appropriately   Cardiac: SR 70-80's. VSS.   Respiratory: HFNC 40% FiO2. Switched to bipap 40% around 0030  GI/: Adequate urine output. No BM  Diet/appetite: Tolerating regular diet. Eating well.  Activity:  Independent in room   Pain: At acceptable level on current regimen.   Skin: No new deficits noted.  LDA's: PICC     Plan: Continue with POC. Notify primary team with changes.

## 2021-10-11 NOTE — PROGRESS NOTES
"Weekly status update      Transplant Phase: Active candidate on lung transplant waitlist since 9/27/21.   Current LAS: 56.2736 (updated 9/29/21).   Candidate for ECMO as bridge to transplant (if indicated): Yes  Candidate for Intubation as bridge to transplant (if indicated): Yes    Brief Assessment/Chart review:  Neuro: Alert/oriented x4, pleasant.   Respiratory: HFNC: FiO2 40-60% at rest. BIPAP: FiO2 40-45%% while sleeping.  Activity: SBA - mobility limited by current O2 requirements.   PT/OT consult? Yes, following. Pt reports exercising with red and yellow bands at bedside throughout the day as able.       Fluid status:  Kidney function: Cr 0.69 (10/10/21)  Weight admission: 73.3kg --> 70kg today  BMI: 26.49 kg/m2 (Goal BMI of 18-30 per lung transplant listing guidelines)      CV:  RHC/Angio: completed 9/8/21  Read:    \"Right sided filling pressures are mildly elevated.    Moderately elevated pulmonary artery hypertension.    Left sided filling pressures are normal.    Normal cardiac output level.    Normal coronary arteries with mild tortuosity\"    ECHO with bubble completed 9/7/21  Read:   \"Interpretation Summary  Global and regional left ventricular function is normal with an EF of 60-65%.  Global right ventricular function is normal.  No significant valvular abnormalities present.  The patent foramen ovale was demonstrated by color Doppler .  Estimated pulmonary artery systolic pressure is 40 mmHg. Pulmonary  hypertension is present.  The inferior vena cava was normal in size with preserved respiratory  Variability. No pericardial effusion is present.\"      Anticoagulation:  - Heparin injections 5000 units q12h (DVT prophylaxis)    *Note: DVT prophylaxis for active candidates on lung transplant waitlist: Please order Heparin instead of Lovenox due to availability of reversal agent in the event the patient is called to the OR for transplant.       Blood transfusions:  Has not received blood products this " "admission.   Current Hgb: 11.6 (10/11/21)  Last PRA on 9/7/21 - UNOS cPRA of 0 (standard lung mfi)  Recommend repeating PRA 3 months on: 12/7/21     *If considering blood transfusion, please contact transplant team/coordinator due to risk of sensitization.         Infection:  WBC remain elevated, currently 26.5 today. Negative BCx2, UA on 10/7.   Pt reports no new or increased respiratory symptoms, temps <99 per chart review. No increase in O2 requirements per documentation.     - Surgical tooth extraction (concern for periodontal abscess) on 9/22/21 of lower left first molar with alveoloplasty on the lower left quadrant near extraction site (see dental note for further details).     - Evaluated by Dermatology for facial (chin area) lesions. Area swabbed, positive for HSV2. Acyclovir treatment completed 10/10.          Anti-infectives:   - Cefepime (9/5-9/15)  - Azithromycin (9/7-current)  - Fluconazole (9/5-9/14)  - Doxycycline (9/5-9/7)  - Bactrim (9/5-current)  - Acyclovir (9/30-10/10)    ----------------------  - Solumedrol 125 mg through 9/14  9/15: Prednisone 60 mg x3 days  9/18: Prednisone 50mg x3 days  9/21: Prednisone 40mg x3 days  9/24: Prednisone 30mg x3 days  9/27- current: Prednisone 20mg daily      Mood/Behavior/Coping:  Pleasant, denied questions related to transplant at this time. Mentioned feeling \"pretty down\" this last week in regards to being in the hospital. Agree with psych consult to review medications. Was able to go outside and see family this past weekend.      Transplant education:  Completed 9/8-9/10/21. Hepatitis C paperwork completed 9/13/21, scanned copies placed in paper chart.      Mallory Beasley, RN, BSN, PHN  Inpatient Lung Transplant Coordinator  Solid Organ Transplant  [facility]  Pager: [phone]    "

## 2021-10-11 NOTE — PROGRESS NOTES
[facility]    Medicine Progress Note - Hospitalist Service, Gold 10       Date of Admission:  9/5/2021    Assessment & Plan          Edson Thornton is a 56 year old male with history of NSIP associated with rheumatoid arthritis and traction bronchiectasis who was admitted on 9/5/2021 with acute on chronic hypoxic respiratory failure likely from ILD. Weaning steroids now in preparation for transplant listing. 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    Today:  -increasing selective serotonin reuptake inhibitor  -consulting health psychology for support    Acute on chronic hypoxic respiratory failure  secondary to progressive ILD  Although the patient had tachypnea and leukocytosis on 9/16/2021, sepsis has been effectively ruled out.  2 view chest x-ray showed improvement in the patient's known interstitial lung disease.  -Appreciate pulm consult  -Continue BiPAP at night, HFNC during day  -Continue azithromycin as an anti-inflammatory  -Continue prednisone 20 mg daily (no plans for further taper)  -Continue MMF 1500 mg twice daily, per pulmonary  -IV diuresis 10/5 due to rising weight--no further diuresis 10/6-10/9      Lung transplant candidacy  -Perimolar abscess excision completed on 9/22/2021 in OR by dentistry. Discussed with dental team 9/24, no indication for antibiotics post tooth extraction   -Although the patient had indeterminate quanteferon gold, this was deemed to be low risk for active or latent tuberculosis based on evaluation of transplant infectious disease.  -The patient received ivermectin at 15 mg X1 dose for treatment of possible stronglydies based on recommendations of transplant ID  -Negative urinary coccidoses antigen   -The patient will need monthly coccidoses antigen for 12 months after transplant  - When transplanted please send, in addition to the lungs, a mediastinal LN for pathology, fungal cx and AFB smear and cx.   -CT chest  w/o contrast with slightly improved groundglass opacities (but still quite prominent)  -The patient will likely need to follow up with ID as an outpatient within 2 months of lung transplant to follow up on the urinary Cocci Ag and the LN and lungs biopsies.   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  Physical exam with chronic crusted over lesions throughout lower jaw.  Consulted to dermatology and jaw lesions HSV 2+ with initiation of acyclovir.  Preliminary plan to continue 3 times daily acyclovir for 10 days.  May need longer depending on immunosuppression/resolution of lesions.  -Consult dermatology, appreciate recs  -Continue acyclovir 400 mg 3 times daily x10 days completed 10/9   -No current need for precautions (not disseminated) at this time     Elevated Lactate - intermittent, resolved  Intermittently found to have elevated lactate after triggering sepsis due to WBC and HR. Usually resolved with minimal/no interventions (I.e. small fluid bolus). Unclear etiology- suspect due to high metabolic stress due to hypoxia vs spurrious from blood draw. Low suspicion for sepsis  -Continue to monitor clinically     Moderate (Non-severe) malnutrition in the context of chronic illness, all are POA  Malnutrition:    - Level of malnutrition: Non-Severe   - Based on: weight loss, mild (or greater) muscle loss  -Nutrition consulted  -Calorie counts, trend weights      Thrombocytopenia, resolved  Anemia- likely anemia of chronic disease. Baseline around 10-- where he has been while hospitalized.    Leukocytosis -waxing/waning. No other signs of acute infection. Continuing to monitor but suspect due to stress response, steroids. As above-- spoke again with  ID on 10/8 and not thought to be missing infection. Inflammatory marker trend is mild and is without fever/chills, increased dyspnea, diarrhea, vomiting, abdominal pain, chest pain, rash, joint pain.  --Cultures obtained, trending inflammatory markers  -Blood cultures NGTD  -Urine culture NGTD     Vitamin D deficiency, POA  -Continue vitamin D replacement     Rheumatoid arthritis  Patient seen by rheumatology this admission. No acute flare of rheumatoid arthritis. SINCERE antibodies negative, flow cytometry negative for CD19 cells, CK normal, aldolase normal. Anti-Irene-1 negative. The patient was treated with leflunomide from 5/21 to 7/21. Rheumatology considers that leflunomide toxicity might contribute to the patient's ILD.   -Continue cholestyramine TID to increase leflunomide clearance.      Steroid induced diabetes  -Continue insulin - decreased glargine to 6 units 10/5 due to borderline hypoglycemia  -Continue insulin NovoLog at medium insulin resistance     Chronic medical problems  Hypothyroidism   Continue 25 mg levothyroxine qday     SALTY  Continue BiPAP at night.      Hypertension  On 5mg amlodipine, BP well controlled .     Anxiety  Depression  Increase escitalopram to 10 mg       Diet: Snacks/Supplements Adult: Other; pt may order snacks/supplements PRN; Between Meals  Snacks/Supplements Adult: Ensure Enlive; With Meals  Regular Diet Adult    DVT Prophylaxis: Heparin SQ  Watson Catheter: Not present  Central Lines: None  Code Status: Full Code      Disposition Plan   Expected discharge: 10/14/2021   recommended to TBD once receives lung transplant and recovers.     The patient's care was discussed with the Care Coordinator/, Patient and Pulmonary Transplant Consultant.    Shauna Juarez MD  Hospitalist Service, [address]  Securely message with the [employer] Web Console (learn more here)  Text page via [employer] Paging/Directory  Please see sign  in/sign out for up to date coverage information    Clinically Significant Risk Factors Present on Admission                ______________________________________________________________________    Interval History   No events overnight. His family had been visiting and now they returned home to [place]. No fever/chills. He told Mallory (transplant SW) that he was interested in some psychologic support due to ongoing stress of hospitalization plus family stress Breathing feels unchanged. No new development of diarrhea, chest pain, rash.    Data reviewed today: I reviewed all medications, new labs and imaging results over the last 24 hours. I personally reviewed no images or EKG's today.    Physical Exam   Vital Signs: Temp: 98.8  F (37.1  C) Temp src: Oral BP: 136/83 Pulse: 75   Resp: 20 SpO2: 97 % O2 Device: High Flow Nasal Cannula (HFNC) Oxygen Delivery: 30 LPM  Weight: 154 lbs 5.15 oz  Constitutional: Awake, alert and in no distress. Sitting comfortably in bed  Head: Normocephalic. Atraumatic.   Cardiovascular: Regular rate and rhythm. No murmurs, clicks, gallops or rubs. No edema  Respiratory: Occasional scattered crackles posteriorly  Gastrointestinal: Abdomen soft, non-tender. BS normal. Musculoskeletal: extremities normal- no gross deformities noted and normal muscle tone  Psychiatric: little eye contact, seems stressed    Data   Recent Labs   Lab 10/11/21  1707 10/11/21  1138 10/11/21  0747 10/10/21  1201 10/10/21  1024 10/08/21  1224 10/08/21  0947 10/07/21  1811 10/07/21  1727 10/06/21  1517 10/06/21  1007 10/05/21  0751 10/05/21  0318   WBC  --   --   --   --  26.5*  --  22.4*  --  26.9*  --  22.0*   < > 20.9*   HGB  --   --   --   --  11.6*  --  11.1*  --  11.7*  --  11.5*   < > 10.4*   MCV  --   --   --   --  93  --  93  --  93  --  91   < > 92   PLT  --   --   --   --  221  --  211  --  230  --  217   < > 228   NA  --   --   --   --  139  --   --   --   --   --  138  --  140   POTASSIUM  --   --   --   --   3.6  --   --   --   --   --  3.4  --  3.5   CHLORIDE  --   --   --   --  104  --   --   --   --   --  104  --  106   CO2  --   --   --   --  29  --   --   --   --   --  32  --  28   BUN  --   --   --   --  19  --   --   --   --   --  17  --  17   CR  --   --   --   --  0.69  --   --   --   --   --  0.66  --  0.69   ANIONGAP  --   --   --   --  6  --   --   --   --   --  2*  --  6   ERIK  --   --   --   --  9.6  --   --   --   --   --  9.6  --  8.8   * 136* 90   < > 106*   < >  --    < >  --    < > 85   < > 125*    < > = values in this interval not displayed.     Medications     - MEDICATION INSTRUCTIONS -       - MEDICATION INSTRUCTIONS -         amLODIPine  5 mg Oral Daily     azithromycin  250 mg Oral Daily     escitalopram  10 mg Oral QPM     fluticasone  1 puff Inhalation Daily     fluticasone  1 spray Both Nostrils Daily     heparin ANTICOAGULANT  5,000 Units Subcutaneous Q12H     heparin lock flush  5-20 mL Intracatheter Q24H     insulin aspart   Subcutaneous TID AC     insulin aspart  1-7 Units Subcutaneous TID AC     insulin aspart  1-5 Units Subcutaneous At Bedtime     insulin glargine  6 Units Subcutaneous QAM AC     levothyroxine  25 mcg Oral QAM AC     melatonin  5 mg Oral At Bedtime     mycophenolate  1,500 mg Oral BID IS     pantoprazole  40 mg Oral BID AC     predniSONE  20 mg Oral Daily     sodium chloride (PF)  10-40 mL Intracatheter Q8H     sodium chloride (PF)  3 mL Intracatheter Q8H     sulfamethoxazole-trimethoprim  1 tablet Oral Once per day on Mon Wed Fri     vitamin D2  50,000 Units Oral Q7 Days

## 2021-10-12 ENCOUNTER — APPOINTMENT (OUTPATIENT)
Dept: PHYSICAL THERAPY | Facility: CLINIC | Age: 56
End: 2021-10-12
Attending: STUDENT IN AN ORGANIZED HEALTH CARE EDUCATION/TRAINING PROGRAM
Payer: COMMERCIAL

## 2021-10-12 LAB
BACTERIA BLD CULT: NO GROWTH
BACTERIA BLD CULT: NO GROWTH
BASOPHILS # BLD MANUAL: 0 10E3/UL (ref 0–0.2)
BASOPHILS NFR BLD MANUAL: 0 %
CRP SERPL-MCNC: 17 MG/L (ref 0–8)
EOSINOPHIL # BLD MANUAL: 0 10E3/UL (ref 0–0.7)
EOSINOPHIL NFR BLD MANUAL: 0 %
ERYTHROCYTE [DISTWIDTH] IN BLOOD BY AUTOMATED COUNT: 17.2 % (ref 10–15)
GLUCOSE BLDC GLUCOMTR-MCNC: 107 MG/DL (ref 70–99)
GLUCOSE BLDC GLUCOMTR-MCNC: 115 MG/DL (ref 70–99)
GLUCOSE BLDC GLUCOMTR-MCNC: 95 MG/DL (ref 70–99)
GLUCOSE BLDC GLUCOMTR-MCNC: 99 MG/DL (ref 70–99)
HCT VFR BLD AUTO: 34.5 % (ref 40–53)
HGB BLD-MCNC: 11.1 G/DL (ref 13.3–17.7)
HOLD SPECIMEN: NORMAL
LACTATE SERPL-SCNC: 1 MMOL/L (ref 0.7–2)
LYMPHOCYTES # BLD MANUAL: 2 10E3/UL (ref 0.8–5.3)
LYMPHOCYTES NFR BLD MANUAL: 7 %
MCH RBC QN AUTO: 29.4 PG (ref 26.5–33)
MCHC RBC AUTO-ENTMCNC: 32.2 G/DL (ref 31.5–36.5)
MCV RBC AUTO: 91 FL (ref 78–100)
METAMYELOCYTES # BLD MANUAL: 0.6 10E3/UL
METAMYELOCYTES NFR BLD MANUAL: 2 %
MONOCYTES # BLD MANUAL: 0.9 10E3/UL (ref 0–1.3)
MONOCYTES NFR BLD MANUAL: 3 %
NEUTROPHILS # BLD MANUAL: 25.1 10E3/UL (ref 1.6–8.3)
NEUTROPHILS NFR BLD MANUAL: 88 %
NRBC # BLD AUTO: 0.3 10E3/UL
NRBC BLD MANUAL-RTO: 1 %
PLAT MORPH BLD: ABNORMAL
PLATELET # BLD AUTO: 208 10E3/UL (ref 150–450)
POTASSIUM BLD-SCNC: 3.6 MMOL/L (ref 3.4–5.3)
RBC # BLD AUTO: 3.78 10E6/UL (ref 4.4–5.9)
RBC MORPH BLD: ABNORMAL
WBC # BLD AUTO: 28.5 10E3/UL (ref 4–11)

## 2021-10-12 PROCEDURE — 250N000013 HC RX MED GY IP 250 OP 250 PS 637: Performed by: DENTIST

## 2021-10-12 PROCEDURE — 36592 COLLECT BLOOD FROM PICC: CPT | Performed by: INTERNAL MEDICINE

## 2021-10-12 PROCEDURE — 86140 C-REACTIVE PROTEIN: CPT | Performed by: INTERNAL MEDICINE

## 2021-10-12 PROCEDURE — 94660 CPAP INITIATION&MGMT: CPT

## 2021-10-12 PROCEDURE — 99232 SBSQ HOSP IP/OBS MODERATE 35: CPT | Mod: GC | Performed by: INTERNAL MEDICINE

## 2021-10-12 PROCEDURE — 999N000157 HC STATISTIC RCP TIME EA 10 MIN

## 2021-10-12 PROCEDURE — 250N000012 HC RX MED GY IP 250 OP 636 PS 637: Performed by: STUDENT IN AN ORGANIZED HEALTH CARE EDUCATION/TRAINING PROGRAM

## 2021-10-12 PROCEDURE — 85027 COMPLETE CBC AUTOMATED: CPT | Performed by: INTERNAL MEDICINE

## 2021-10-12 PROCEDURE — 97110 THERAPEUTIC EXERCISES: CPT | Mod: GP

## 2021-10-12 PROCEDURE — 36592 COLLECT BLOOD FROM PICC: CPT | Performed by: STUDENT IN AN ORGANIZED HEALTH CARE EDUCATION/TRAINING PROGRAM

## 2021-10-12 PROCEDURE — 250N000011 HC RX IP 250 OP 636: Performed by: INTERNAL MEDICINE

## 2021-10-12 PROCEDURE — 99232 SBSQ HOSP IP/OBS MODERATE 35: CPT | Performed by: INTERNAL MEDICINE

## 2021-10-12 PROCEDURE — 999N000007 HC SITE CHECK

## 2021-10-12 PROCEDURE — 250N000012 HC RX MED GY IP 250 OP 636 PS 637: Performed by: DENTIST

## 2021-10-12 PROCEDURE — 250N000013 HC RX MED GY IP 250 OP 250 PS 637: Performed by: INTERNAL MEDICINE

## 2021-10-12 PROCEDURE — 120N000003 HC R&B IMCU UMMC

## 2021-10-12 PROCEDURE — 83605 ASSAY OF LACTIC ACID: CPT | Performed by: INTERNAL MEDICINE

## 2021-10-12 PROCEDURE — 250N000011 HC RX IP 250 OP 636: Performed by: PHYSICIAN ASSISTANT

## 2021-10-12 PROCEDURE — 84132 ASSAY OF SERUM POTASSIUM: CPT | Performed by: STUDENT IN AN ORGANIZED HEALTH CARE EDUCATION/TRAINING PROGRAM

## 2021-10-12 RX ORDER — PREDNISONE 5 MG/1
15 TABLET ORAL DAILY
Status: DISCONTINUED | OUTPATIENT
Start: 2021-10-13 | End: 2021-10-16

## 2021-10-12 RX ADMIN — INSULIN ASPART 2 UNITS: 100 INJECTION, SOLUTION INTRAVENOUS; SUBCUTANEOUS at 09:44

## 2021-10-12 RX ADMIN — PANTOPRAZOLE SODIUM 40 MG: 40 TABLET, DELAYED RELEASE ORAL at 08:06

## 2021-10-12 RX ADMIN — ESCITALOPRAM OXALATE 10 MG: 5 TABLET, FILM COATED ORAL at 19:58

## 2021-10-12 RX ADMIN — MYCOPHENOLATE MOFETIL 1500 MG: 500 TABLET, FILM COATED ORAL at 18:27

## 2021-10-12 RX ADMIN — LEVOTHYROXINE SODIUM 25 MCG: 0.03 TABLET ORAL at 08:06

## 2021-10-12 RX ADMIN — SODIUM CHLORIDE, PRESERVATIVE FREE 15 ML: 5 INJECTION INTRAVENOUS at 15:59

## 2021-10-12 RX ADMIN — Medication 5 MG: at 19:58

## 2021-10-12 RX ADMIN — HEPARIN SODIUM 5000 UNITS: 5000 INJECTION, SOLUTION INTRAVENOUS; SUBCUTANEOUS at 08:07

## 2021-10-12 RX ADMIN — SODIUM CHLORIDE, PRESERVATIVE FREE 5 ML: 5 INJECTION INTRAVENOUS at 05:49

## 2021-10-12 RX ADMIN — INSULIN ASPART 7 UNITS: 100 INJECTION, SOLUTION INTRAVENOUS; SUBCUTANEOUS at 14:02

## 2021-10-12 RX ADMIN — FLUTICASONE PROPIONATE 1 SPRAY: 50 SPRAY, METERED NASAL at 08:07

## 2021-10-12 RX ADMIN — MYCOPHENOLATE MOFETIL 1500 MG: 500 TABLET, FILM COATED ORAL at 08:06

## 2021-10-12 RX ADMIN — PREDNISONE 20 MG: 20 TABLET ORAL at 08:06

## 2021-10-12 RX ADMIN — HEPARIN SODIUM 5000 UNITS: 5000 INJECTION, SOLUTION INTRAVENOUS; SUBCUTANEOUS at 19:58

## 2021-10-12 RX ADMIN — FLUTICASONE FUROATE 1 PUFF: 100 POWDER RESPIRATORY (INHALATION) at 08:07

## 2021-10-12 RX ADMIN — AMLODIPINE BESYLATE 5 MG: 5 TABLET ORAL at 08:07

## 2021-10-12 RX ADMIN — INSULIN GLARGINE 6 UNITS: 100 INJECTION, SOLUTION SUBCUTANEOUS at 08:07

## 2021-10-12 RX ADMIN — AZITHROMYCIN MONOHYDRATE 250 MG: 250 TABLET ORAL at 08:07

## 2021-10-12 RX ADMIN — INSULIN ASPART 6 UNITS: 100 INJECTION, SOLUTION INTRAVENOUS; SUBCUTANEOUS at 18:28

## 2021-10-12 RX ADMIN — PANTOPRAZOLE SODIUM 40 MG: 40 TABLET, DELAYED RELEASE ORAL at 16:00

## 2021-10-12 ASSESSMENT — MIFFLIN-ST. JEOR: SCORE: 1453

## 2021-10-12 ASSESSMENT — ACTIVITIES OF DAILY LIVING (ADL)
ADLS_ACUITY_SCORE: 8
ADLS_ACUITY_SCORE: 8
ADLS_ACUITY_SCORE: 10
ADLS_ACUITY_SCORE: 8

## 2021-10-12 NOTE — PROGRESS NOTES
[facility]    Medicine Progress Note - Hospitalist Service, Gold 10       Date of Admission:  9/5/2021    Assessment & Plan          Edson Thornton is a 56 year old male with history of NSIP associated with rheumatoid arthritis and traction bronchiectasis who was admitted on 9/5/2021 with acute on chronic hypoxic respiratory failure likely from ILD. Weaning steroids now in preparation for transplant listing. 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    Today:  - Cont current management     Acute on chronic hypoxic respiratory failure  secondary to progressive ILD  Although the patient had tachypnea and leukocytosis on 9/16/2021, sepsis has been effectively ruled out.  2 view chest x-ray showed improvement in the patient's known interstitial lung disease.  -Appreciate pulm consult  -Continue BiPAP at night, HFNC during day  -Continue azithromycin as an anti-inflammatory  -Continue prednisone 20 mg daily (no plans for further taper)  -Continue MMF 1500 mg twice daily, per pulmonary  -IV diuresis 10/5 due to rising weight--no further diuresis 10/6-10/9      Lung transplant candidacy  -Perimolar abscess excision completed on 9/22/2021 in OR by dentistry. Discussed with dental team 9/24, no indication for antibiotics post tooth extraction   -Although the patient had indeterminate quanteferon gold, this was deemed to be low risk for active or latent tuberculosis based on evaluation of transplant infectious disease.  -The patient received ivermectin at 15 mg X1 dose for treatment of possible stronglydies based on recommendations of transplant ID  -Negative urinary coccidoses antigen   -The patient will need monthly coccidoses antigen for 12 months after transplant  - When transplanted please send, in addition to the lungs, a mediastinal LN for pathology, fungal cx and AFB smear and cx.   -CT chest w/o contrast with slightly improved groundglass opacities (but  still quite prominent)  -The patient will likely need to follow up with ID as an outpatient within 2 months of lung transplant to follow up on the urinary Cocci Ag and the LN and lungs biopsies.   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  Physical exam with chronic crusted over lesions throughout lower jaw.  Consulted to dermatology and jaw lesions HSV 2+ with initiation of acyclovir.  Preliminary plan to continue 3 times daily acyclovir for 10 days.  May need longer depending on immunosuppression/resolution of lesions.  -Consult dermatology, appreciate recs  -Continue acyclovir 400 mg 3 times daily x10 days completed 10/9   -No current need for precautions (not disseminated) at this time     Elevated Lactate - intermittent, resolved  Intermittently found to have elevated lactate after triggering sepsis due to WBC and HR. Usually resolved with minimal/no interventions (I.e. small fluid bolus). Unclear etiology- suspect due to high metabolic stress due to hypoxia vs spurrious from blood draw. Low suspicion for sepsis  -Continue to monitor clinically     Moderate (Non-severe) malnutrition in the context of chronic illness, all are POA  Malnutrition:    - Level of malnutrition: Non-Severe   - Based on: weight loss, mild (or greater) muscle loss  -Nutrition consulted  -Calorie counts, trend weights      Thrombocytopenia, resolved  Anemia- likely anemia of chronic disease. Baseline around 10-- where he has been while hospitalized.    Leukocytosis -waxing/waning. No other signs of acute infection. Continuing to monitor but suspect due to stress response, steroids. As above-- spoke again with ID on 10/8 and not thought to be missing infection. Inflammatory  marker trend is mild and is without fever/chills, increased dyspnea, diarrhea, vomiting, abdominal pain, chest pain, rash, joint pain.  --Cultures obtained, trending inflammatory markers  -Blood cultures NGTD  -Urine culture NGTD     Vitamin D deficiency, POA  -Continue vitamin D replacement     Rheumatoid arthritis  Patient seen by rheumatology this admission. No acute flare of rheumatoid arthritis. SINCERE antibodies negative, flow cytometry negative for CD19 cells, CK normal, aldolase normal. Anti-Irene-1 negative. The patient was treated with leflunomide from 5/21 to 7/21. Rheumatology considers that leflunomide toxicity might contribute to the patient's ILD.   -Continue cholestyramine TID to increase leflunomide clearance.      Steroid induced diabetes  -Continue insulin - decreased glargine to 6 units 10/5 due to borderline hypoglycemia  -Continue insulin NovoLog at medium insulin resistance     Chronic medical problems  Hypothyroidism   Continue 25 mg levothyroxine qday     SALTY  Continue BiPAP at night.      Hypertension  On 5mg amlodipine, BP well controlled .     Anxiety  Depression  Increase escitalopram to 10 mg       Diet: Snacks/Supplements Adult: Other; pt may order snacks/supplements PRN; Between Meals  Snacks/Supplements Adult: Ensure Enlive; With Meals  Regular Diet Adult    DVT Prophylaxis: Heparin SQ  Watson Catheter: Not present  Central Lines: None  Code Status: Full Code      Disposition Plan   Expected discharge: 10/19/2021   recommended to TBD once receives lung transplant and recovers.     The patient's care was discussed with the Care Coordinator/, Patient and Pulmonary Transplant Consultant.    Ronnie Cervantes MD  Hospitalist Service, [address]  Securely message with the Vocera Web Console (learn more here)  Text page via [employer] Meet[employer] Paging/Directory  Please see sign in/sign out for up to date coverage information    Clinically  Significant Risk Factors Present on Admission                ______________________________________________________________________    Interval History   No events overnight, has no complaints, eating well     Data reviewed today: I reviewed all medications, new labs and imaging results over the last 24 hours. I personally reviewed no images or EKG's today.    Physical Exam   Vital Signs: Temp: 97.2  F (36.2  C) Temp src: Oral BP: (!) 145/84 Pulse: 79   Resp: 22 SpO2: 94 % O2 Device: High Flow Nasal Cannula (HFNC) Oxygen Delivery: 30 LPM  Weight: 156 lbs 15.48 oz    Constitutional: Awake, alert, cooperative, no apparent distress  Respiratory: on HFNC  Cardiovascular: Regular rate and rhythm, normal S1 and S2, and no murmur noted, no edema  GI: Normal bowel sounds, soft, non-distended, non-tender  Skin/Integumen: No rashes, no cyanosis      Data   Recent Labs   Lab 10/12/21  1038 10/12/21  0810 10/12/21  0552 10/11/21  2306 10/11/21  1707 10/10/21  1201 10/10/21  1024 10/08/21  1224 10/08/21  0947 10/06/21  1517 10/06/21  1007   WBC 28.5*  --   --   --   --   --  26.5*  --  22.4*   < > 22.0*   HGB 11.1*  --   --   --   --   --  11.6*  --  11.1*   < > 11.5*   MCV 91  --   --   --   --   --  93  --  93   < > 91     --   --   --   --   --  221  --  211   < > 217   NA  --   --   --   --   --   --  139  --   --   --  138   POTASSIUM  --   --  3.6  --   --   --  3.6  --   --   --  3.4   CHLORIDE  --   --   --   --   --   --  104  --   --   --  104   CO2  --   --   --   --   --   --  29  --   --   --  32   BUN  --   --   --   --   --   --  19  --   --   --  17   CR  --   --   --   --   --   --  0.69  --   --   --  0.66   ANIONGAP  --   --   --   --   --   --  6  --   --   --  2*   ERIK  --   --   --   --   --   --  9.6  --   --   --  9.6   GLC  --  95  --  115* 168*   < > 106*   < >  --    < > 85    < > = values in this interval not displayed.     Medications     - MEDICATION INSTRUCTIONS -       - MEDICATION  INSTRUCTIONS -         amLODIPine  5 mg Oral Daily     azithromycin  250 mg Oral Daily     escitalopram  10 mg Oral QPM     fluticasone  1 puff Inhalation Daily     fluticasone  1 spray Both Nostrils Daily     heparin ANTICOAGULANT  5,000 Units Subcutaneous Q12H     heparin lock flush  5-20 mL Intracatheter Q24H     insulin aspart   Subcutaneous TID AC     insulin aspart  1-7 Units Subcutaneous TID AC     insulin aspart  1-5 Units Subcutaneous At Bedtime     insulin glargine  6 Units Subcutaneous QAM AC     levothyroxine  25 mcg Oral QAM AC     melatonin  5 mg Oral At Bedtime     mycophenolate  1,500 mg Oral BID IS     pantoprazole  40 mg Oral BID AC     predniSONE  20 mg Oral Daily     sodium chloride (PF)  10-40 mL Intracatheter Q8H     sodium chloride (PF)  3 mL Intracatheter Q8H     sulfamethoxazole-trimethoprim  1 tablet Oral Once per day on Mon Wed Fri     vitamin D2  50,000 Units Oral Q7 Days

## 2021-10-12 NOTE — PROGRESS NOTES
[facility]   Pulmonary   Progress Note  Edson Thornton MRN: [id number]  1965  Date of Admission:9/5/2021  Date of Service: 10/12/2021        Assessment & Plan    56-year-old male (BMI 27.6) with history of NSIP/ILD, RA (status post Rituxan infusion, leflunomide), transferred from [facility] for acute on chronic hypoxemic respiratory failure refractory to treatment. Now listed for transplant and treating with MMF. Decreasing pred from 20 to 15mg per day on 10/12.     1. Acute on chronic hypoxemic respiratory failure, stable  2. NSIP-ILD  3. Rheumatoid arthritis (positive anti-CCP, RF)     Discussion:   Patient's O2 requirements remain unchanged despite drop in prednisone 20 mg daily started on 9/27. Will decrease to 15mg today. MMF can take up to 4 weeks for its antiinflammatory effect to occur. Increased dose of MMF on 9/30 and repeat CT showed slight improvement in ground glass opacities. We note the 9mm LLL ground glass opacity which is of unclear significance at this point and will not  in the immediate future.    Recommendations    -No changes today  -Continue to trend CRP q48  -Continue MMF 1500 mg BID, pred 15 mg qday, azithromycin    We will continue to follow.     Patient seen & discussed w/  Dr. Fidencio Weaver MD   Pulmonary/Critical Care Fellow   Pager #[phone]    Attending note:  Patient seen, examined and discussed with Dr. Weaver. All data reviewed. Agree with the assessment and plan as outlined in the above note.  Clinically stable, awaiting lung transplant. Will decrease steroid dose to 15 mg /day.     Yuki Nicolas MD  [phone]    Interval History      RN notes reviewed, no acute events overnight. Walking on 15L. Trying to eat well and stay busy. Today feeling less positive after being in the hospital so long.     Five-point ROS is negative except for what is noted in the interval history.    Physical Exam Temp:  [97.2  F (36.2  C)-99.4  F (37.4  C)]  97.2  F (36.2  C)  Pulse:  [69-96] 79  Resp:  [20-23] 22  BP: (124-145)/(76-85) 145/84  FiO2 (%):  [40 %] 40 %  SpO2:  [94 %-100 %] 94 %  I/O last 3 completed shifts:  In: 1380 [P.O.:1380]  Out: 1075 [Urine:1075]  Wt Readings from Last 1 Encounters:   10/12/21 71.2 kg (156 lb 15.5 oz)    Body mass index is 26.94 kg/m . FiO2 (%): 40 %  Resp: 22      Exam:  General:  Sitting upright on HFNC, cooperative, NAD.  HEENT: Anicteric sclera. EOMI.  Lungs: Bibasilar crackles; speaking in full sentences on HFNC 45% O2 at 30 LPM.   Abd: Soft, NT, ND  Ext: WWP,  No LE edema  Skin: No cyanosis, or jaundice  Neuro: AAOx3, no focal deficits    Data   Labs: reviewed in EMR and notable labs listed below.  CBC and BMP generally stable.     Imaging: reviewed in EMR and notable imaging listed below.    Chest X-Ray 2 views 9/23/21  IMPRESSION: Continued decrease in interstitial and airspace opacities,  compared to chest x-ray 9/19/2021. Enlarged cardiomediastinal  silhouette.    Chest CT high resolution 9/7/21:  IMPRESSION:   1. Extensive bilateral groundglass and reticular opacities are  slightly increased compared to chest CT from 8/30/2021. This likely  represents progression of known NSIP, however superimposed infection  or cannot be excluded.  2. Small bilateral pleural effusions.  3. Stable mediastinal lymphadenopathy, likely reactive.    CT 9/30/21  IMPRESSION: Overall slightly improved groundglass opacities and  organizing pneumonia pattern but still quite prominent. Mild  bronchiectasis. Minimal air trapping. Focal 9 mm left lower lobe  groundglass opacity was likely present before but is more conspicuous  due to the surrounding improved aeration currently. Small hiatal  hernia. Multiple nonenlarged mediastinal lymph nodes similar in  appearance.     Medications     amLODIPine  5 mg Oral Daily     azithromycin  250 mg Oral Daily     escitalopram  10 mg Oral QPM     fluticasone  1 puff Inhalation Daily     fluticasone  1 spray  Both Nostrils Daily     heparin ANTICOAGULANT  5,000 Units Subcutaneous Q12H     heparin lock flush  5-20 mL Intracatheter Q24H     insulin aspart   Subcutaneous TID AC     insulin aspart  1-7 Units Subcutaneous TID AC     insulin aspart  1-5 Units Subcutaneous At Bedtime     insulin glargine  6 Units Subcutaneous QAM AC     levothyroxine  25 mcg Oral QAM AC     melatonin  5 mg Oral At Bedtime     mycophenolate  1,500 mg Oral BID IS     pantoprazole  40 mg Oral BID AC     predniSONE  20 mg Oral Daily     sodium chloride (PF)  10-40 mL Intracatheter Q8H     sodium chloride (PF)  3 mL Intracatheter Q8H     sulfamethoxazole-trimethoprim  1 tablet Oral Once per day on Mon Wed Fri     vitamin D2  50,000 Units Oral Q7 Days

## 2021-10-12 NOTE — PLAN OF CARE
"BP (!) 140/86 (BP Location: Left arm)   Pulse 110   Temp 98.5  F (36.9  C) (Oral)   Resp 24   Ht 1.626 m (5' 4\")   Wt 71.2 kg (156 lb 15.5 oz)   SpO2 94%   BMI 26.94 kg/m      Neuro: A&Ox4, able to make needs known calls appropiately  Cardiac: SR-ST (with activity) 70's-110's, BP's 120's-140's/70's-80's, afebrile         Respiratory: Sating >92% on 40% HFNC 30LPM during day, increased O2 needs with activity and BiPAP @ 40% overnight, for increased O2 needs with activity (10-15LPN oxymask with ambulation in segura and in gym with PT)  GI/: Adequate urine output via urinal. BM x1 this shift-soft/formed  Diet/appetite: Tolerating Regular diet. Eating well. ACHS BG checks with sliding scale and carb coverage  Activity:  Independent in room, up to chair and in halls. Worked with therapy in the gym today  Pain: At acceptable level on current regimen. Denies pain.  Skin: No new deficits noted. Blanchable redness on sacrum/coccyx, encouraged repositioning and pillow shifts in bed  LDA's:  R- PICC: SLx3     Plan: Awaiting lungs. Continue with POC. Notify primary team with changes.     Autumn Vann RN  6B Intermediate Care Unit     "

## 2021-10-12 NOTE — PLAN OF CARE
Neuro: A&Ox4.   Cardiac: SR. VSS.   Respiratory: On HFNC at 40% FiO2 during the day, BiPAP at 40% FiO2 while sleeping with sats > 92%.  GI/: Adequate urine output. No stool this shift. Denies nausea.   Diet/appetite: Tolerating regular diet. Eating well. Carb coverage with meals.   Activity:  Independent in room.  Pain: Denies pain.   Skin: No new deficits noted.  LDA's: TL PICC, heparin locked.     Plan: Continue with POC. Notify primary team with changes.

## 2021-10-12 NOTE — PROGRESS NOTES
Transplant Social Work Services Progress Note      Date of Initial Social Work Evaluation: 9/9/2021  Collaborated with: transplant coordinator    Data: phone call received from patient requesting assistance gathering records that he needs to file with a indemnity insurance plan.  Intervention:  Identified needed documents.  Will fax to insurance company. Supportive counseling provided to patient.  He reviewed his illness journey and events that led up to him being hospitalized for transplant.  He feels bad that he didn't seek medical care sooner.  Wonders if that would have made a difference in course of care.  Support and reassurance offer.  Patient has been attending lung transplant support group online weekly and finds it helpful.    Assessment: coping ok with long hospitalization, long wait for transplant  Education provided by SW: n/a  Plan:    Discharge Plans in Progress: will be in hospital until transplant    Barriers to d/c plan: high oxygen needs    Follow up Plan: will continue to follow for support, counseling, education and resources.     [FreeTextEntry1] : Patient is in good mental and physical health. She required refill for hydroxyzine for her atopic rash. She has not undergone colon cancer screening and cologaurd was prescribed. She is scheduled to receive the Prevnar 23 vaccine.

## 2021-10-12 NOTE — PROGRESS NOTES
CLINICAL NUTRITION SERVICES - REASSESSMENT NOTE     Nutrition Prescription    RECOMMENDATIONS FOR MDs/PROVIDERS TO ORDER:  Continue to encourage oral intake     Malnutrition Status:     Patient does not meet two of the established criteria necessary for diagnosing malnutrition but is at risk    Recommendations already ordered by Registered Dietitian (RD):  Continue supplements PRN     Future/Additional Recommendations:  Continue to monitor appetite, oral intake, use of supplements   Need for additional transplant nutrition education      If enteral nutrition is needed:   Pre transplant: Osmolite 1.5 Conner @ goal of  50ml/hr (1200 ml/day) + 1 packet prosource  will provide: 1840 kcals (28 kcal/kg), 86g PRO (1.3) , 914 ml free H20, 244 g CHO, and 0 g fiber daily.     Post transplant: Osmolite 1.5 Conner @ goal of  60ml/hr (1440ml/day) + 1 packet prosurce  will provide: 2200 kcals (34 kca/kg), 101 g PRO (1.5 g/kg), 1097 ml free H20, 293 g CHO, and 0 g fiber daily.     EVALUATION OF THE PROGRESS TOWARD GOALS   Diet: Regular + ensure enlive (PRN)   Intake: 100%     Woody was eating hamburger with abdullahi, chips, pudding and had ensure enlive on tray. Reported eating well. Denied any nutrition concerns at this time        NEW FINDINGS   Weight Trends:   10/12/21 0000 71.2 kg (156 lb 15.5 oz)   10/05/21 0330 69.8 kg (153 lb 14.1 oz)   09/29/21 0437 69.1 kg (152 lb 6.4 oz)   09/23/21 0322 67.3 kg (148 lb 5.9 oz)   09/19/21 0000 65.5 kg (144 lb 6.4 oz)   09/13/21 0020 64 kg (141 lb 1.5 oz)   09/10/21 0400 68.1 kg (150 lb 1.6 oz)   09/05/21 1752 73.3 kg (161 lb 8 oz)   Will maintain dosing weight of 64 kg     Respiratory: HFNC 40% FiO2, Bipap @ night   GI: Last bowel movement, 10/11, one stool documented.   Skin: No new deficits   Labs: Reviewed      Medications:   Novolog/Lantus  Levothyroxine   Mycophenolate  Protonix  Prednisone  Ergocalciferol 50,000 units q 7 days    MALNUTRITION  % Intake: No decreased intake noted  % Weight  Loss: None noted  Subcutaneous Fat Loss: None observed  Muscle Loss: Temporal:  mild, Upper arm (bicep, tricep):  mild and Dorsal hand:  moderate, from last assessment, not reassessed as eating lunch  Fluid Accumulation/Edema: None noted  Malnutrition Diagnosis: Patient does not meet two of the established criteria necessary for diagnosing malnutrition but is at risk    Previous Goals   Patient to consume % of nutritionally adequate meal trays TID, or the equivalent with supplements/snacks.  Evaluation: Met    Previous Nutrition Diagnosis  Predicted inadequate nutrient intake (energy/protein) related to prolonged hospitalization as evidenced by day 30 of hospitalization   Evaluation: Modified     CURRENT NUTRITION DIAGNOSIS  Predicted inadequate nutrient intake (energy/protein) related to prolonged hospitalization as evidenced by day 37 of hospitalization     INTERVENTIONS  Implementation  Collaboration with other providers    Goals  Patient to consume % of nutritionally adequate meal trays TID, or the equivalent with supplements/snacks.    Monitoring/Evaluation  Progress toward goals will be monitored and evaluated per protocol.    Janny Khan RD, ANTONI  6B pager: [phone]

## 2021-10-13 ENCOUNTER — APPOINTMENT (OUTPATIENT)
Dept: PHYSICAL THERAPY | Facility: CLINIC | Age: 56
End: 2021-10-13
Attending: STUDENT IN AN ORGANIZED HEALTH CARE EDUCATION/TRAINING PROGRAM
Payer: COMMERCIAL

## 2021-10-13 LAB
GLUCOSE BLDC GLUCOMTR-MCNC: 110 MG/DL (ref 70–99)
GLUCOSE BLDC GLUCOMTR-MCNC: 139 MG/DL (ref 70–99)
GLUCOSE BLDC GLUCOMTR-MCNC: 180 MG/DL (ref 70–99)
GLUCOSE BLDC GLUCOMTR-MCNC: 82 MG/DL (ref 70–99)
LACTATE SERPL-SCNC: 0.9 MMOL/L (ref 0.7–2)
POTASSIUM BLD-SCNC: 3.5 MMOL/L (ref 3.4–5.3)

## 2021-10-13 PROCEDURE — 90791 PSYCH DIAGNOSTIC EVALUATION: CPT | Mod: U7 | Performed by: STUDENT IN AN ORGANIZED HEALTH CARE EDUCATION/TRAINING PROGRAM

## 2021-10-13 PROCEDURE — 99232 SBSQ HOSP IP/OBS MODERATE 35: CPT | Performed by: INTERNAL MEDICINE

## 2021-10-13 PROCEDURE — 250N000013 HC RX MED GY IP 250 OP 250 PS 637: Performed by: DENTIST

## 2021-10-13 PROCEDURE — 120N000003 HC R&B IMCU UMMC

## 2021-10-13 PROCEDURE — 250N000012 HC RX MED GY IP 250 OP 636 PS 637: Performed by: INTERNAL MEDICINE

## 2021-10-13 PROCEDURE — 94660 CPAP INITIATION&MGMT: CPT

## 2021-10-13 PROCEDURE — 250N000012 HC RX MED GY IP 250 OP 636 PS 637: Performed by: STUDENT IN AN ORGANIZED HEALTH CARE EDUCATION/TRAINING PROGRAM

## 2021-10-13 PROCEDURE — 250N000011 HC RX IP 250 OP 636: Performed by: INTERNAL MEDICINE

## 2021-10-13 PROCEDURE — 36592 COLLECT BLOOD FROM PICC: CPT | Performed by: INTERNAL MEDICINE

## 2021-10-13 PROCEDURE — 250N000013 HC RX MED GY IP 250 OP 250 PS 637: Performed by: INTERNAL MEDICINE

## 2021-10-13 PROCEDURE — 36592 COLLECT BLOOD FROM PICC: CPT | Performed by: STUDENT IN AN ORGANIZED HEALTH CARE EDUCATION/TRAINING PROGRAM

## 2021-10-13 PROCEDURE — 97110 THERAPEUTIC EXERCISES: CPT | Mod: GP

## 2021-10-13 PROCEDURE — 84132 ASSAY OF SERUM POTASSIUM: CPT | Performed by: INTERNAL MEDICINE

## 2021-10-13 PROCEDURE — 83605 ASSAY OF LACTIC ACID: CPT | Performed by: STUDENT IN AN ORGANIZED HEALTH CARE EDUCATION/TRAINING PROGRAM

## 2021-10-13 PROCEDURE — 250N000011 HC RX IP 250 OP 636: Performed by: PHYSICIAN ASSISTANT

## 2021-10-13 PROCEDURE — 999N000157 HC STATISTIC RCP TIME EA 10 MIN

## 2021-10-13 RX ADMIN — SODIUM CHLORIDE, PRESERVATIVE FREE 5 ML: 5 INJECTION INTRAVENOUS at 04:27

## 2021-10-13 RX ADMIN — FLUTICASONE PROPIONATE 1 SPRAY: 50 SPRAY, METERED NASAL at 08:07

## 2021-10-13 RX ADMIN — AMLODIPINE BESYLATE 5 MG: 5 TABLET ORAL at 08:07

## 2021-10-13 RX ADMIN — MYCOPHENOLATE MOFETIL 1500 MG: 500 TABLET, FILM COATED ORAL at 08:06

## 2021-10-13 RX ADMIN — INSULIN ASPART 5 UNITS: 100 INJECTION, SOLUTION INTRAVENOUS; SUBCUTANEOUS at 08:25

## 2021-10-13 RX ADMIN — SODIUM CHLORIDE, PRESERVATIVE FREE 15 ML: 5 INJECTION INTRAVENOUS at 15:52

## 2021-10-13 RX ADMIN — ESCITALOPRAM OXALATE 10 MG: 5 TABLET, FILM COATED ORAL at 19:54

## 2021-10-13 RX ADMIN — SULFAMETHOXAZOLE AND TRIMETHOPRIM 1 TABLET: 800; 160 TABLET ORAL at 08:21

## 2021-10-13 RX ADMIN — Medication 5 MG: at 19:54

## 2021-10-13 RX ADMIN — MYCOPHENOLATE MOFETIL 1500 MG: 500 TABLET, FILM COATED ORAL at 18:13

## 2021-10-13 RX ADMIN — ERGOCALCIFEROL 50000 UNITS: 1.25 CAPSULE, LIQUID FILLED ORAL at 13:52

## 2021-10-13 RX ADMIN — HEPARIN SODIUM 5000 UNITS: 5000 INJECTION, SOLUTION INTRAVENOUS; SUBCUTANEOUS at 08:07

## 2021-10-13 RX ADMIN — AZITHROMYCIN MONOHYDRATE 250 MG: 250 TABLET ORAL at 08:07

## 2021-10-13 RX ADMIN — PANTOPRAZOLE SODIUM 40 MG: 40 TABLET, DELAYED RELEASE ORAL at 15:47

## 2021-10-13 RX ADMIN — SODIUM CHLORIDE, PRESERVATIVE FREE 5 ML: 5 INJECTION INTRAVENOUS at 08:10

## 2021-10-13 RX ADMIN — PANTOPRAZOLE SODIUM 40 MG: 40 TABLET, DELAYED RELEASE ORAL at 08:06

## 2021-10-13 RX ADMIN — INSULIN GLARGINE 6 UNITS: 100 INJECTION, SOLUTION SUBCUTANEOUS at 08:08

## 2021-10-13 RX ADMIN — LEVOTHYROXINE SODIUM 25 MCG: 0.03 TABLET ORAL at 08:07

## 2021-10-13 RX ADMIN — INSULIN ASPART 9 UNITS: 100 INJECTION, SOLUTION INTRAVENOUS; SUBCUTANEOUS at 13:53

## 2021-10-13 RX ADMIN — FLUTICASONE FUROATE 1 PUFF: 100 POWDER RESPIRATORY (INHALATION) at 08:07

## 2021-10-13 RX ADMIN — PREDNISONE 15 MG: 5 TABLET ORAL at 08:06

## 2021-10-13 RX ADMIN — HEPARIN SODIUM 5000 UNITS: 5000 INJECTION, SOLUTION INTRAVENOUS; SUBCUTANEOUS at 19:54

## 2021-10-13 ASSESSMENT — ACTIVITIES OF DAILY LIVING (ADL)
ADLS_ACUITY_SCORE: 10
ADLS_ACUITY_SCORE: 10
ADLS_ACUITY_SCORE: 8
ADLS_ACUITY_SCORE: 8
ADLS_ACUITY_SCORE: 10
ADLS_ACUITY_SCORE: 10

## 2021-10-13 ASSESSMENT — MIFFLIN-ST. JEOR: SCORE: 1457

## 2021-10-13 NOTE — PLAN OF CARE
Neuro: A&Ox4.   Cardiac: SR, ST with activity. VSS.   Respiratory: On HFNC at 40% FiO2 during the day, BiPAP at 40% FiO2 while sleeping with sats > 92%.  GI/: Adequate urine output. No stool this shift. Denies nausea.   Diet/appetite: Tolerating regular diet. Eating well. Carb coverage with meals.   Activity:  Independent in room.  Pain: Denies pain.  Skin: No new deficits noted. Encouraging repositioning and shifting in bed.   LDA's: TL PICC, heparin locked.     Plan: Continue with POC. Notify primary team with changes.
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
## 2021-10-13 NOTE — PROGRESS NOTES
[facility]    Medicine Progress Note - Hospitalist Service, Gold 10       Date of Admission:  9/5/2021    Assessment & Plan          Edson Thornton is a 56 year old male with history of NSIP associated with rheumatoid arthritis and traction bronchiectasis who was admitted on 9/5/2021 with acute on chronic hypoxic respiratory failure likely from ILD. Weaning steroids now in preparation for transplant listing. 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    Today:  - Cont current management     Acute on chronic hypoxic respiratory failure  secondary to progressive ILD  Although the patient had tachypnea and leukocytosis on 9/16/2021, sepsis has been effectively ruled out.  2 view chest x-ray showed improvement in the patient's known interstitial lung disease.  - Appreciate Pulm recommendations   -Continue on stable BiPAP at night, HFNC during day  -Continue azithromycin as an anti-inflammatory  -Continue prednisone 20 mg daily (no plans for further taper)  -Continue MMF 1500 mg twice daily, per pulmonary  -IV diuresis 10/5 due to rising weight--no further diuresis 10/6-10/9      Lung transplant candidacy  -Perimolar abscess excision completed on 9/22/2021 in OR by dentistry. Discussed with dental team 9/24, no indication for antibiotics post tooth extraction   -Although the patient had indeterminate quanteferon gold, this was deemed to be low risk for active or latent tuberculosis based on evaluation of transplant infectious disease.  -The patient received ivermectin at 15 mg X1 dose for treatment of possible stronglydies based on recommendations of transplant ID  -Negative urinary coccidoses antigen   -The patient will need monthly coccidoses antigen for 12 months after transplant  - When transplanted please send, in addition to the lungs, a mediastinal LN for pathology, fungal cx and AFB smear and cx.   -CT chest w/o contrast with slightly improved  groundglass opacities (but still quite prominent)  -The patient will likely need to follow up with ID as an outpatient within 2 months of lung transplant to follow up on the urinary Cocci Ag and the LN and lungs biopsies.   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  Physical exam with chronic crusted over lesions throughout lower jaw.  Consulted to dermatology and jaw lesions HSV 2+ with initiation of acyclovir.  Preliminary plan to continue 3 times daily acyclovir for 10 days.  May need longer depending on immunosuppression/resolution of lesions.  -Consult dermatology, appreciate recs  -Continue acyclovir 400 mg 3 times daily x10 days completed 10/9   -No current need for precautions (not disseminated) at this time     Elevated Lactate - intermittent, resolved  Intermittently found to have elevated lactate after triggering sepsis due to WBC and HR. Usually resolved with minimal/no interventions (I.e. small fluid bolus). Unclear etiology- suspect due to high metabolic stress due to hypoxia vs spurrious from blood draw. Low suspicion for sepsis  -Continue to monitor clinically     Moderate (Non-severe) malnutrition in the context of chronic illness, all are POA  Malnutrition:    - Level of malnutrition: Non-Severe   - Based on: weight loss, mild (or greater) muscle loss  -Nutrition consulted  -Calorie counts, trend weights      Thrombocytopenia, resolved  Anemia- likely anemia of chronic disease. Baseline around 10-- where he has been while hospitalized.    Leukocytosis -waxing/waning. No other signs of acute infection. Continuing to monitor but suspect due to stress response, steroids. As above-- spoke again with ID on 10/8 and not thought to be  missing infection. Inflammatory marker trend is mild and is without fever/chills, increased dyspnea, diarrhea, vomiting, abdominal pain, chest pain, rash, joint pain.  --Cultures obtained, trending inflammatory markers  -Blood cultures NGTD  -Urine culture NGTD     Vitamin D deficiency, POA  -Continue vitamin D replacement     Rheumatoid arthritis  Patient seen by rheumatology this admission. No acute flare of rheumatoid arthritis. SINCERE antibodies negative, flow cytometry negative for CD19 cells, CK normal, aldolase normal. Anti-Irene-1 negative. The patient was treated with leflunomide from 5/21 to 7/21. Rheumatology considers that leflunomide toxicity might contribute to the patient's ILD.   -Continue cholestyramine TID to increase leflunomide clearance.      Steroid induced diabetes  -Continue insulin - decreased glargine to 6 units 10/5 due to borderline hypoglycemia  -Continue insulin NovoLog at medium insulin resistance     Chronic medical problems  Hypothyroidism   Continue 25 mg levothyroxine qday     SALTY  Continue BiPAP at night.      Hypertension  On 5mg amlodipine, BP well controlled .     Anxiety  Depression  Increase escitalopram to 10 mg       Diet: Snacks/Supplements Adult: Other; pt may order snacks/supplements PRN; Between Meals  Snacks/Supplements Adult: Ensure Enlive; With Meals  Regular Diet Adult    DVT Prophylaxis: Heparin SQ  Watson Catheter: Not present  Central Lines: None  Code Status: Full Code      Disposition Plan   Expected discharge: 10/20/2021   recommended to TBD once receives lung transplant and recovers.     The patient's care was discussed with the Care Coordinator/, Patient and Pulmonary Transplant Consultant.    Ronnie Cervantes MD  Hospitalist Service, [address]  Securely message with the Vocera Web Console (learn more here)  Text page via [employer] Paging/Directory  Please see sign in/sign out for up to date coverage  information    Clinically Significant Risk Factors Present on Admission                ______________________________________________________________________    Interval History   No events overnight, has no complaints, eating well, denies fever or chills     Data reviewed today: I reviewed all medications, new labs and imaging results over the last 24 hours. I personally reviewed no images or EKG's today.    Physical Exam   Vital Signs: Temp: 98.5  F (36.9  C) Temp src: Oral BP: 138/82 Pulse: 68   Resp: 22 SpO2: 98 % O2 Device: High Flow Nasal Cannula (HFNC) Oxygen Delivery: 30 LPM  Weight: 157 lbs 13.59 oz    Constitutional: Awake, alert, cooperative, no apparent distress  Respiratory: on HFNC  Cardiovascular: Regular rate and rhythm, normal S1 and S2, and no murmur noted, no edema  GI: Normal bowel sounds, soft, non-distended, non-tender  Skin/Integumen: No rashes, no cyanosis      Data   Recent Labs   Lab 10/13/21  0803 10/13/21  0428 10/12/21  2209 10/12/21  1805 10/12/21  1332 10/12/21  1038 10/12/21  0810 10/12/21  0552 10/10/21  1201 10/10/21  1024 10/08/21  1224 10/08/21  0947   WBC  --   --   --   --   --  28.5*  --   --   --  26.5*  --  22.4*   HGB  --   --   --   --   --  11.1*  --   --   --  11.6*  --  11.1*   MCV  --   --   --   --   --  91  --   --   --  93  --  93   PLT  --   --   --   --   --  208  --   --   --  221  --  211   NA  --   --   --   --   --   --   --   --   --  139  --   --    POTASSIUM  --  3.5  --   --   --   --   --  3.6  --  3.6  --   --    CHLORIDE  --   --   --   --   --   --   --   --   --  104  --   --    CO2  --   --   --   --   --   --   --   --   --  29  --   --    BUN  --   --   --   --   --   --   --   --   --  19  --   --    CR  --   --   --   --   --   --   --   --   --  0.69  --   --    ANIONGAP  --   --   --   --   --   --   --   --   --  6  --   --    ERIK  --   --   --   --   --   --   --   --   --  9.6  --   --    GLC 82  --  99 107*   < >  --    < >  --    < > 106*    < >  --     < > = values in this interval not displayed.     Medications     - MEDICATION INSTRUCTIONS -       - MEDICATION INSTRUCTIONS -         amLODIPine  5 mg Oral Daily     azithromycin  250 mg Oral Daily     escitalopram  10 mg Oral QPM     fluticasone  1 puff Inhalation Daily     fluticasone  1 spray Both Nostrils Daily     heparin ANTICOAGULANT  5,000 Units Subcutaneous Q12H     heparin lock flush  5-20 mL Intracatheter Q24H     insulin aspart   Subcutaneous TID AC     insulin aspart  1-7 Units Subcutaneous TID AC     insulin aspart  1-5 Units Subcutaneous At Bedtime     insulin glargine  6 Units Subcutaneous QAM AC     levothyroxine  25 mcg Oral QAM AC     melatonin  5 mg Oral At Bedtime     mycophenolate  1,500 mg Oral BID IS     pantoprazole  40 mg Oral BID AC     predniSONE  15 mg Oral Daily     sodium chloride (PF)  10-40 mL Intracatheter Q8H     sodium chloride (PF)  3 mL Intracatheter Q8H     sulfamethoxazole-trimethoprim  1 tablet Oral Once per day on Mon Wed Fri     vitamin D2  50,000 Units Oral Q7 Days

## 2021-10-13 NOTE — PLAN OF CARE
Neuro: A&Ox4. Afebrile, calls appropriately.   Cardiac: SR/ST, -130's with activity, VSS. Denies chest pain and SOB.  Respiratory: Sating >92% on 40% FiO2 HFNC at 30 LPM, tolerated 6L NC  while up in chair for a few hours, 15L oxymask when walking with PT. Lung sounds clear/diminished.   GI/: Adequate urine output via urinal, no BM during shift.   Diet/appetite: Tolerating regular diet. Eating well. ACHS BG checks with carb coverage.  Activity: Independent, up to chair and in halls with PT.   Pain: At acceptable level on current regimen.   Skin: No new deficits noted.  LDA's: Triple lumen PICC-heparin locked.     Plan: Continue with POC. Notify primary team with changes.

## 2021-10-14 ENCOUNTER — ORGAN (OUTPATIENT)
Dept: TRANSPLANT | Facility: CLINIC | Age: 56
End: 2021-10-14

## 2021-10-14 ENCOUNTER — APPOINTMENT (OUTPATIENT)
Dept: PHYSICAL THERAPY | Facility: CLINIC | Age: 56
End: 2021-10-14
Attending: STUDENT IN AN ORGANIZED HEALTH CARE EDUCATION/TRAINING PROGRAM
Payer: COMMERCIAL

## 2021-10-14 ENCOUNTER — DOCUMENTATION ONLY (OUTPATIENT)
Dept: TRANSPLANT | Facility: CLINIC | Age: 56
End: 2021-10-14

## 2021-10-14 DIAGNOSIS — Z76.82 AWAITING ORGAN TRANSPLANT: Primary | ICD-10-CM

## 2021-10-14 LAB
BASOPHILS # BLD MANUAL: 0.2 10E3/UL (ref 0–0.2)
BASOPHILS NFR BLD MANUAL: 1 %
CRP SERPL-MCNC: 23 MG/L (ref 0–8)
EOSINOPHIL # BLD MANUAL: 0.5 10E3/UL (ref 0–0.7)
EOSINOPHIL NFR BLD MANUAL: 2 %
ERYTHROCYTE [DISTWIDTH] IN BLOOD BY AUTOMATED COUNT: 17.2 % (ref 10–15)
GLUCOSE BLDC GLUCOMTR-MCNC: 108 MG/DL (ref 70–99)
GLUCOSE BLDC GLUCOMTR-MCNC: 118 MG/DL (ref 70–99)
GLUCOSE BLDC GLUCOMTR-MCNC: 90 MG/DL (ref 70–99)
GLUCOSE BLDC GLUCOMTR-MCNC: 95 MG/DL (ref 70–99)
HCT VFR BLD AUTO: 36.9 % (ref 40–53)
HGB BLD-MCNC: 11.8 G/DL (ref 13.3–17.7)
HOLD SPECIMEN: NORMAL
LACTATE SERPL-SCNC: 0.5 MMOL/L (ref 0.7–2)
LYMPHOCYTES # BLD MANUAL: 4.1 10E3/UL (ref 0.8–5.3)
LYMPHOCYTES NFR BLD MANUAL: 17 %
MCH RBC QN AUTO: 29.6 PG (ref 26.5–33)
MCHC RBC AUTO-ENTMCNC: 32 G/DL (ref 31.5–36.5)
MCV RBC AUTO: 93 FL (ref 78–100)
MONOCYTES # BLD MANUAL: 0.7 10E3/UL (ref 0–1.3)
MONOCYTES NFR BLD MANUAL: 3 %
NEUTROPHILS # BLD MANUAL: 18.7 10E3/UL (ref 1.6–8.3)
NEUTROPHILS NFR BLD MANUAL: 77 %
PLAT MORPH BLD: ABNORMAL
PLATELET # BLD AUTO: 258 10E3/UL (ref 150–450)
POTASSIUM BLD-SCNC: 3.6 MMOL/L (ref 3.4–5.3)
RBC # BLD AUTO: 3.99 10E6/UL (ref 4.4–5.9)
RBC MORPH BLD: ABNORMAL
WBC # BLD AUTO: 24.3 10E3/UL (ref 4–11)

## 2021-10-14 PROCEDURE — 36592 COLLECT BLOOD FROM PICC: CPT | Performed by: INTERNAL MEDICINE

## 2021-10-14 PROCEDURE — 250N000012 HC RX MED GY IP 250 OP 636 PS 637: Performed by: STUDENT IN AN ORGANIZED HEALTH CARE EDUCATION/TRAINING PROGRAM

## 2021-10-14 PROCEDURE — 250N000012 HC RX MED GY IP 250 OP 636 PS 637: Performed by: INTERNAL MEDICINE

## 2021-10-14 PROCEDURE — 97110 THERAPEUTIC EXERCISES: CPT | Mod: GP

## 2021-10-14 PROCEDURE — 86140 C-REACTIVE PROTEIN: CPT | Performed by: INTERNAL MEDICINE

## 2021-10-14 PROCEDURE — 250N000011 HC RX IP 250 OP 636: Performed by: INTERNAL MEDICINE

## 2021-10-14 PROCEDURE — 84132 ASSAY OF SERUM POTASSIUM: CPT | Performed by: INTERNAL MEDICINE

## 2021-10-14 PROCEDURE — 999N000157 HC STATISTIC RCP TIME EA 10 MIN

## 2021-10-14 PROCEDURE — 85027 COMPLETE CBC AUTOMATED: CPT | Performed by: INTERNAL MEDICINE

## 2021-10-14 PROCEDURE — 250N000013 HC RX MED GY IP 250 OP 250 PS 637: Performed by: DENTIST

## 2021-10-14 PROCEDURE — 120N000003 HC R&B IMCU UMMC

## 2021-10-14 PROCEDURE — 250N000011 HC RX IP 250 OP 636: Performed by: PHYSICIAN ASSISTANT

## 2021-10-14 PROCEDURE — 99232 SBSQ HOSP IP/OBS MODERATE 35: CPT | Performed by: INTERNAL MEDICINE

## 2021-10-14 PROCEDURE — 250N000013 HC RX MED GY IP 250 OP 250 PS 637: Performed by: INTERNAL MEDICINE

## 2021-10-14 PROCEDURE — 94660 CPAP INITIATION&MGMT: CPT

## 2021-10-14 PROCEDURE — 83605 ASSAY OF LACTIC ACID: CPT | Performed by: INTERNAL MEDICINE

## 2021-10-14 RX ADMIN — HEPARIN SODIUM 5000 UNITS: 5000 INJECTION, SOLUTION INTRAVENOUS; SUBCUTANEOUS at 09:26

## 2021-10-14 RX ADMIN — LEVOTHYROXINE SODIUM 25 MCG: 0.03 TABLET ORAL at 09:25

## 2021-10-14 RX ADMIN — SODIUM CHLORIDE, PRESERVATIVE FREE 5 ML: 5 INJECTION INTRAVENOUS at 05:29

## 2021-10-14 RX ADMIN — SODIUM CHLORIDE, PRESERVATIVE FREE 5 ML: 5 INJECTION INTRAVENOUS at 09:39

## 2021-10-14 RX ADMIN — Medication 5 MG: at 19:54

## 2021-10-14 RX ADMIN — FLUTICASONE PROPIONATE 1 SPRAY: 50 SPRAY, METERED NASAL at 09:26

## 2021-10-14 RX ADMIN — INSULIN ASPART 5 UNITS: 100 INJECTION, SOLUTION INTRAVENOUS; SUBCUTANEOUS at 10:16

## 2021-10-14 RX ADMIN — MYCOPHENOLATE MOFETIL 1500 MG: 500 TABLET, FILM COATED ORAL at 09:25

## 2021-10-14 RX ADMIN — FLUTICASONE FUROATE 1 PUFF: 100 POWDER RESPIRATORY (INHALATION) at 09:26

## 2021-10-14 RX ADMIN — PANTOPRAZOLE SODIUM 40 MG: 40 TABLET, DELAYED RELEASE ORAL at 17:13

## 2021-10-14 RX ADMIN — HEPARIN SODIUM 5000 UNITS: 5000 INJECTION, SOLUTION INTRAVENOUS; SUBCUTANEOUS at 19:54

## 2021-10-14 RX ADMIN — INSULIN ASPART 5 UNITS: 100 INJECTION, SOLUTION INTRAVENOUS; SUBCUTANEOUS at 20:03

## 2021-10-14 RX ADMIN — ACETAMINOPHEN 650 MG: 325 TABLET, FILM COATED ORAL at 23:16

## 2021-10-14 RX ADMIN — AMLODIPINE BESYLATE 5 MG: 5 TABLET ORAL at 09:24

## 2021-10-14 RX ADMIN — INSULIN GLARGINE 6 UNITS: 100 INJECTION, SOLUTION SUBCUTANEOUS at 09:26

## 2021-10-14 RX ADMIN — ESCITALOPRAM OXALATE 10 MG: 5 TABLET, FILM COATED ORAL at 19:54

## 2021-10-14 RX ADMIN — AZITHROMYCIN MONOHYDRATE 250 MG: 250 TABLET ORAL at 09:25

## 2021-10-14 RX ADMIN — PREDNISONE 15 MG: 5 TABLET ORAL at 09:25

## 2021-10-14 RX ADMIN — SODIUM CHLORIDE, PRESERVATIVE FREE 5 ML: 5 INJECTION INTRAVENOUS at 23:19

## 2021-10-14 RX ADMIN — MYCOPHENOLATE MOFETIL 1500 MG: 500 TABLET, FILM COATED ORAL at 17:13

## 2021-10-14 RX ADMIN — SODIUM CHLORIDE, PRESERVATIVE FREE 15 ML: 5 INJECTION INTRAVENOUS at 17:14

## 2021-10-14 RX ADMIN — INSULIN ASPART 4 UNITS: 100 INJECTION, SOLUTION INTRAVENOUS; SUBCUTANEOUS at 14:54

## 2021-10-14 RX ADMIN — PANTOPRAZOLE SODIUM 40 MG: 40 TABLET, DELAYED RELEASE ORAL at 09:25

## 2021-10-14 ASSESSMENT — MIFFLIN-ST. JEOR: SCORE: 1455

## 2021-10-14 ASSESSMENT — ACTIVITIES OF DAILY LIVING (ADL)
ADLS_ACUITY_SCORE: 8

## 2021-10-14 NOTE — PROGRESS NOTES
PATHOLOGY HLA CROSSMATCH CONSULTATION: DONOR/RECIPIENT  VIRTUAL CROSSMATCH - Lung  Consultation Date: 10/14/2021  Consultation Requested by: Dr. Clark    Regarding: Compatibility of  donor organ UNOS #[id number] from OPO: JASMIN  with Edson Thornton    Findings: Regarding a virtual crossmatch between Edson Thornton and  donor listed above (match ID [id number]):  The most recent (9.7.21) and  No additional patient serum/sera  were analyzed.  The patient has no antibodies listed with HLA specificity against the donor organ.      Record Review Indicates: I personally reviewed the most recent serum, the historic peak sera, and all other sera with solid-phase HLA Single Antigen test results:  The patient has no HLA antibodies against the donor organ.     The results of this virtual XM are:   -most recent serum: compatible       Disclaimer: Clinical judgement must take into account other factors, such as non-HLA antibodies not detected in the assay. The VXM gives probabilities only.  The probability does not account for the potential for auto-antibodies that may be present in the patient's serum.  These autoantibodies may render the physical crossmatch falsely positive, and would be detected by an autologous crossmatch.  When possible, confirm findings with prospective allogeneic and autologous flow crossmatches before going to transplant as clinically indicated.     Lindy Briceno MD  Medical Director, Immunology/Histocompatibility Laboratory

## 2021-10-14 NOTE — PROGRESS NOTES
[facility]    Medicine Progress Note - Hospitalist Service, Gold 10       Date of Admission:  9/5/2021    Assessment & Plan          Edson Thornton is a 56 year old male with history of NSIP associated with rheumatoid arthritis and traction bronchiectasis who was admitted on 9/5/2021 with acute on chronic hypoxic respiratory failure likely from ILD. Weaning steroids now in preparation for transplant listing. 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    Today:  - Cont current management     Acute on chronic hypoxic respiratory failure  secondary to progressive ILD  Although the patient had tachypnea and leukocytosis on 9/16/2021, sepsis has been effectively ruled out.  2 view chest x-ray showed improvement in the patient's known interstitial lung disease.  - Appreciate Pulm recommendations   -Continue on stable BiPAP at night, HFNC during day  -Continue azithromycin as an anti-inflammatory  -Continue prednisone 20 mg daily (no plans for further taper)  -Continue MMF 1500 mg twice daily, per pulmonary  -IV diuresis 10/5 due to rising weight--no further diuresis 10/6-10/9      Lung transplant candidacy  -Perimolar abscess excision completed on 9/22/2021 in OR by dentistry. Discussed with dental team 9/24, no indication for antibiotics post tooth extraction   -Although the patient had indeterminate quanteferon gold, this was deemed to be low risk for active or latent tuberculosis based on evaluation of transplant infectious disease.  -The patient received ivermectin at 15 mg X1 dose for treatment of possible stronglydies based on recommendations of transplant ID  -Negative urinary coccidoses antigen   -The patient will need monthly coccidoses antigen for 12 months after transplant  - When transplanted please send, in addition to the lungs, a mediastinal LN for pathology, fungal cx and AFB smear and cx.   -CT chest w/o contrast with slightly improved  groundglass opacities (but still quite prominent)  -The patient will likely need to follow up with ID as an outpatient within 2 months of lung transplant to follow up on the urinary Cocci Ag and the LN and lungs biopsies.   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  Physical exam with chronic crusted over lesions throughout lower jaw.  Consulted to dermatology and jaw lesions HSV 2+ with initiation of acyclovir.  Preliminary plan to continue 3 times daily acyclovir for 10 days.  May need longer depending on immunosuppression/resolution of lesions.  -Consult dermatology, appreciate recs  -Continue acyclovir 400 mg 3 times daily x10 days completed 10/9   -No current need for precautions (not disseminated) at this time     Elevated Lactate - intermittent, resolved  Intermittently found to have elevated lactate after triggering sepsis due to WBC and HR. Usually resolved with minimal/no interventions (I.e. small fluid bolus). Unclear etiology- suspect due to high metabolic stress due to hypoxia vs spurrious from blood draw. Low suspicion for sepsis  -Continue to monitor clinically     Moderate (Non-severe) malnutrition in the context of chronic illness, all are POA  Malnutrition:    - Level of malnutrition: Non-Severe   - Based on: weight loss, mild (or greater) muscle loss  -Nutrition consulted  -Calorie counts, trend weights      Thrombocytopenia, resolved  Anemia- likely anemia of chronic disease. Baseline around 10-- where he has been while hospitalized.    Leukocytosis -waxing/waning. No other signs of acute infection. Continuing to monitor but suspect due to stress response, steroids. As above-- spoke again with ID on 10/8 and not thought to be  missing infection. Inflammatory marker trend is mild and is without fever/chills, increased dyspnea, diarrhea, vomiting, abdominal pain, chest pain, rash, joint pain.  --Cultures obtained, trending inflammatory markers  -Blood cultures NGTD  -Urine culture NGTD     Vitamin D deficiency, POA  -Continue vitamin D replacement     Rheumatoid arthritis  Patient seen by rheumatology this admission. No acute flare of rheumatoid arthritis. SINCERE antibodies negative, flow cytometry negative for CD19 cells, CK normal, aldolase normal. Anti-Irene-1 negative. The patient was treated with leflunomide from 5/21 to 7/21. Rheumatology considers that leflunomide toxicity might contribute to the patient's ILD.   -Continue cholestyramine TID to increase leflunomide clearance.      Steroid induced diabetes  -Continue insulin - decreased glargine to 6 units 10/5 due to borderline hypoglycemia  -Continue insulin NovoLog at medium insulin resistance     Chronic medical problems  Hypothyroidism   Continue 25 mg levothyroxine qday     SALTY  Continue BiPAP at night.      Hypertension  On 5mg amlodipine, BP well controlled .     Anxiety  Depression  Increase escitalopram to 10 mg       Diet: Snacks/Supplements Adult: Other; pt may order snacks/supplements PRN; Between Meals  Snacks/Supplements Adult: Ensure Enlive; With Meals  Regular Diet Adult    DVT Prophylaxis: Heparin SQ  Watson Catheter: Not present  Central Lines: None  Code Status: Full Code      Disposition Plan   Expected discharge: 10/20/2021   recommended to TBD once receives lung transplant and recovers.     The patient's care was discussed with the Care Coordinator/, Patient and Pulmonary Transplant Consultant.    Ronnie Cervantes MD  Hospitalist Service, [address]  Securely message with the Vocera Web Console (learn more here)  Text page via [employer] Paging/Directory  Please see sign in/sign out for up to date coverage  information    Clinically Significant Risk Factors Present on Admission                ______________________________________________________________________    Interval History   No events overnight, has no complaints, eating well, had PT today without any issues     Data reviewed today: I reviewed all medications, new labs and imaging results over the last 24 hours. I personally reviewed no images or EKG's today.    Physical Exam   Vital Signs: Temp: 97.3  F (36.3  C) Temp src: Axillary BP: 129/82 Pulse: 73   Resp: 22 SpO2: 94 % O2 Device: BiPAP/CPAP Oxygen Delivery: 30 LPM  Weight: 157 lbs 6.54 oz    Constitutional: Awake, alert, cooperative, no apparent distress  Respiratory: on HFNC  Cardiovascular: Regular rate and rhythm, normal S1 and S2, and no murmur noted, no edema  GI: Normal bowel sounds, soft, non-distended, non-tender  Skin/Integumen: No rashes, no cyanosis      Data   Recent Labs   Lab 10/14/21  0824 10/14/21  0533 10/13/21  2134 10/13/21  1751 10/13/21  0803 10/13/21  0428 10/12/21  1332 10/12/21  1038 10/12/21  0810 10/12/21  0552 10/10/21  1201 10/10/21  1024 10/08/21  1224 10/08/21  0947   WBC  --   --   --   --   --   --   --  28.5*  --   --   --  26.5*  --  22.4*   HGB  --   --   --   --   --   --   --  11.1*  --   --   --  11.6*  --  11.1*   MCV  --   --   --   --   --   --   --  91  --   --   --  93  --  93   PLT  --   --   --   --   --   --   --  208  --   --   --  221  --  211   NA  --   --   --   --   --   --   --   --   --   --   --  139  --   --    POTASSIUM  --  3.6  --   --   --  3.5  --   --   --  3.6   < > 3.6  --   --    CHLORIDE  --   --   --   --   --   --   --   --   --   --   --  104  --   --    CO2  --   --   --   --   --   --   --   --   --   --   --  29  --   --    BUN  --   --   --   --   --   --   --   --   --   --   --  19  --   --    CR  --   --   --   --   --   --   --   --   --   --   --  0.69  --   --    ANIONGAP  --   --   --   --   --   --   --   --   --   --   --  6   --   --    ERIK  --   --   --   --   --   --   --   --   --   --   --  9.6  --   --    GLC 90  --  180* 139*   < >  --    < >  --    < >  --    < > 106*   < >  --     < > = values in this interval not displayed.     Medications     - MEDICATION INSTRUCTIONS -       - MEDICATION INSTRUCTIONS -         amLODIPine  5 mg Oral Daily     azithromycin  250 mg Oral Daily     escitalopram  10 mg Oral QPM     fluticasone  1 puff Inhalation Daily     fluticasone  1 spray Both Nostrils Daily     heparin ANTICOAGULANT  5,000 Units Subcutaneous Q12H     heparin lock flush  5-20 mL Intracatheter Q24H     insulin aspart   Subcutaneous TID AC     insulin aspart  1-7 Units Subcutaneous TID AC     insulin aspart  1-5 Units Subcutaneous At Bedtime     insulin glargine  6 Units Subcutaneous QAM AC     levothyroxine  25 mcg Oral QAM AC     melatonin  5 mg Oral At Bedtime     mycophenolate  1,500 mg Oral BID IS     pantoprazole  40 mg Oral BID AC     predniSONE  15 mg Oral Daily     sodium chloride (PF)  10-40 mL Intracatheter Q8H     sodium chloride (PF)  3 mL Intracatheter Q8H     sulfamethoxazole-trimethoprim  1 tablet Oral Once per day on Mon Wed Fri     vitamin D2  50,000 Units Oral Q7 Days

## 2021-10-14 NOTE — TELEPHONE ENCOUNTER
BILATERAL LUNG donor was identified by Kamar Le and reviewed with Dr. Corral.  The organ was accepted and pt was called in for transplant.      Donor UNOS ID [id number] and Match ID [id number] confirmed with Dr. Corral.      Donor and recipient blood type reviewed and found to be IDENTICAL.         Recipient with HLA antibodies: NO  Crossmatch required   Prospective: N/A   Virtual: Negative  Crossmatch reviewed with Dr. Briceno, immunology staff on call and deemed negative based on organ specific protocol.     Donor specific antibodies absent, notified Dr. Corral.    Donor DOES NOT meet criteria to be classified as PHS INCREASED RISK.     Pt was contacted AT [facility] room 6220.    Verified pt has not had any blood transfusions since their last PRA sample on 09/07/2021.     Pt Instructed to remain NPO for pre-op prep.  Instructed to bring medications, oxygen, or equipement.  N/A recipient in house at [facility].    Pt instructed to come to the [facility]  ER. N/A recipient at [facility].    Pt on Coumadin: YES/NO   Intervention: n/a    Pt has had a positive COVID test: Yes/No               Date of last positive COVID test:           ABO/CMV/EBV status note:   UNOS donor ID [id number]  Donor blood type is A: Verified by donor records   Recipient blood type is A: verified by blood bank [facility].   Donor CMV status is negative. Verified by donor records.   Recipient CMV status is negative. Verified in [facility] lab results.   Donor EBV status is positive. Verified by donor records.   Recipient EBV status is positive. Verified in [facility] lab results.   Recipient HSV status is positive. verified in [facility] lab results.   Donor Toxoplasma status is negative. Verified by donor records.  Recipient Toxoplasma status is negative. Verified in [facility] lab results.

## 2021-10-14 NOTE — PLAN OF CARE
Neuro: A&Ox4.   Cardiac: SR-SB. VSS.   Respiratory: Sating adequately on 45% HFNC, switched to 45% BIPAP overnight.  GI/: Adequate urine output.   Diet/appetite: Tolerating regular diet.  Activity:  Up independently in the room.  Pain: At acceptable level on current regimen.   Skin: No new deficits noted.  LDA's: PICC    Plan: Continue with POC. Notify primary team with changes. Pt slept well overnight.

## 2021-10-15 ENCOUNTER — APPOINTMENT (OUTPATIENT)
Dept: GENERAL RADIOLOGY | Facility: CLINIC | Age: 56
End: 2021-10-15
Attending: PHYSICIAN ASSISTANT
Payer: COMMERCIAL

## 2021-10-15 ENCOUNTER — ANESTHESIA EVENT (OUTPATIENT)
Dept: SURGERY | Facility: CLINIC | Age: 56
End: 2021-10-15
Payer: COMMERCIAL

## 2021-10-15 ENCOUNTER — ANESTHESIA (OUTPATIENT)
Dept: SURGERY | Facility: CLINIC | Age: 56
End: 2021-10-15
Payer: COMMERCIAL

## 2021-10-15 LAB
ABO/RH(D): NORMAL
ALBUMIN SERPL-MCNC: 2.9 G/DL (ref 3.4–5)
ALBUMIN UR-MCNC: NEGATIVE MG/DL
ALP SERPL-CCNC: 68 U/L (ref 40–150)
ALT SERPL W P-5'-P-CCNC: 30 U/L (ref 0–70)
ANION GAP SERPL CALCULATED.3IONS-SCNC: 7 MMOL/L (ref 3–14)
ANTIBODY SCREEN: NEGATIVE
APPEARANCE UR: CLEAR
APTT PPP: 30 SECONDS (ref 22–38)
AST SERPL W P-5'-P-CCNC: 16 U/L (ref 0–45)
BASOPHILS # BLD MANUAL: 0 10E3/UL (ref 0–0.2)
BASOPHILS NFR BLD MANUAL: 0 %
BILIRUB DIRECT SERPL-MCNC: <0.1 MG/DL (ref 0–0.2)
BILIRUB SERPL-MCNC: 0.4 MG/DL (ref 0.2–1.3)
BILIRUB UR QL STRIP: NEGATIVE
BUN SERPL-MCNC: 14 MG/DL (ref 7–30)
CALCIUM SERPL-MCNC: 9 MG/DL (ref 8.5–10.1)
CHLORIDE BLD-SCNC: 105 MMOL/L (ref 94–109)
CMV IGG SERPL IA-ACNC: <0.2 U/ML
CMV IGG SERPL IA-ACNC: NORMAL
CO2 SERPL-SCNC: 25 MMOL/L (ref 20–32)
COLOR UR AUTO: YELLOW
CREAT SERPL-MCNC: 0.65 MG/DL (ref 0.66–1.25)
EBV VCA IGG SER IA-ACNC: 81.6 U/ML
EBV VCA IGG SER IA-ACNC: POSITIVE
EOSINOPHIL # BLD MANUAL: 0.2 10E3/UL (ref 0–0.7)
EOSINOPHIL NFR BLD MANUAL: 1 %
ERYTHROCYTE [DISTWIDTH] IN BLOOD BY AUTOMATED COUNT: 17 % (ref 10–15)
GFR SERPL CREATININE-BSD FRML MDRD: >90 ML/MIN/1.73M2
GLUCOSE BLD-MCNC: 129 MG/DL (ref 70–99)
GLUCOSE BLDC GLUCOMTR-MCNC: 117 MG/DL (ref 70–99)
GLUCOSE BLDC GLUCOMTR-MCNC: 80 MG/DL (ref 70–99)
GLUCOSE BLDC GLUCOMTR-MCNC: 81 MG/DL (ref 70–99)
GLUCOSE BLDC GLUCOMTR-MCNC: 96 MG/DL (ref 70–99)
GLUCOSE UR STRIP-MCNC: NEGATIVE MG/DL
HBV CORE AB SERPL QL IA: NONREACTIVE
HBV SURFACE AB SERPL IA-ACNC: 0.12 M[IU]/ML
HBV SURFACE AG SERPL QL IA: NONREACTIVE
HCT VFR BLD AUTO: 34.3 % (ref 40–53)
HCV AB SERPL QL IA: NONREACTIVE
HGB BLD-MCNC: 11.1 G/DL (ref 13.3–17.7)
HGB UR QL STRIP: NEGATIVE
HIV 1+2 AB+HIV1 P24 AG SERPL QL IA: NONREACTIVE
HSV1 IGG SERPL QL IA: 2.66 INDEX
HSV1 IGG SERPL QL IA: ABNORMAL
HSV2 IGG SERPL QL IA: 10.4 INDEX
HSV2 IGG SERPL QL IA: ABNORMAL
IGG SERPL-MCNC: 265 MG/DL (ref 610–1616)
INR PPP: 1.02 (ref 0.85–1.15)
KETONES UR STRIP-MCNC: NEGATIVE MG/DL
LEUKOCYTE ESTERASE UR QL STRIP: NEGATIVE
LYMPHOCYTES # BLD MANUAL: 0.9 10E3/UL (ref 0.8–5.3)
LYMPHOCYTES NFR BLD MANUAL: 4 %
MAGNESIUM SERPL-MCNC: 1.9 MG/DL (ref 1.6–2.3)
MCH RBC QN AUTO: 29.8 PG (ref 26.5–33)
MCHC RBC AUTO-ENTMCNC: 32.4 G/DL (ref 31.5–36.5)
MCV RBC AUTO: 92 FL (ref 78–100)
METAMYELOCYTES # BLD MANUAL: 0.2 10E3/UL
METAMYELOCYTES NFR BLD MANUAL: 1 %
MONOCYTES # BLD MANUAL: 0.7 10E3/UL (ref 0–1.3)
MONOCYTES NFR BLD MANUAL: 3 %
MUCOUS THREADS #/AREA URNS LPF: PRESENT /LPF
MYELOCYTES # BLD MANUAL: 0.2 10E3/UL
MYELOCYTES NFR BLD MANUAL: 1 %
NEUTROPHILS # BLD MANUAL: 20.6 10E3/UL (ref 1.6–8.3)
NEUTROPHILS NFR BLD MANUAL: 90 %
NITRATE UR QL: NEGATIVE
PH UR STRIP: 5.5 [PH] (ref 5–7)
PHOSPHATE SERPL-MCNC: 2.6 MG/DL (ref 2.5–4.5)
PLAT MORPH BLD: ABNORMAL
PLATELET # BLD AUTO: 224 10E3/UL (ref 150–450)
POLYCHROMASIA BLD QL SMEAR: SLIGHT
POTASSIUM BLD-SCNC: 3.4 MMOL/L (ref 3.4–5.3)
POTASSIUM BLD-SCNC: 3.6 MMOL/L (ref 3.4–5.3)
PROT SERPL-MCNC: 6.3 G/DL (ref 6.8–8.8)
RBC # BLD AUTO: 3.72 10E6/UL (ref 4.4–5.9)
RBC MORPH BLD: ABNORMAL
RBC URINE: 1 /HPF
SARS-COV-2 RNA RESP QL NAA+PROBE: NEGATIVE
SODIUM SERPL-SCNC: 137 MMOL/L (ref 133–144)
SP GR UR STRIP: 1.02 (ref 1–1.03)
SPECIMEN EXPIRATION DATE: NORMAL
SQUAMOUS EPITHELIAL: <1 /HPF
UROBILINOGEN UR STRIP-MCNC: NORMAL MG/DL
WBC # BLD AUTO: 22.9 10E3/UL (ref 4–11)
WBC URINE: 1 /HPF

## 2021-10-15 PROCEDURE — 86696 HERPES SIMPLEX TYPE 2 TEST: CPT | Performed by: PHYSICIAN ASSISTANT

## 2021-10-15 PROCEDURE — 99232 SBSQ HOSP IP/OBS MODERATE 35: CPT | Mod: GC | Performed by: INTERNAL MEDICINE

## 2021-10-15 PROCEDURE — 87389 HIV-1 AG W/HIV-1&-2 AB AG IA: CPT | Performed by: PHYSICIAN ASSISTANT

## 2021-10-15 PROCEDURE — 120N000003 HC R&B IMCU UMMC

## 2021-10-15 PROCEDURE — 93010 ELECTROCARDIOGRAM REPORT: CPT | Mod: 59 | Performed by: INTERNAL MEDICINE

## 2021-10-15 PROCEDURE — 99232 SBSQ HOSP IP/OBS MODERATE 35: CPT | Performed by: INTERNAL MEDICINE

## 2021-10-15 PROCEDURE — 36415 COLL VENOUS BLD VENIPUNCTURE: CPT | Performed by: PHYSICIAN ASSISTANT

## 2021-10-15 PROCEDURE — 83735 ASSAY OF MAGNESIUM: CPT | Performed by: PHYSICIAN ASSISTANT

## 2021-10-15 PROCEDURE — 86695 HERPES SIMPLEX TYPE 1 TEST: CPT | Performed by: PHYSICIAN ASSISTANT

## 2021-10-15 PROCEDURE — 250N000011 HC RX IP 250 OP 636: Performed by: PHYSICIAN ASSISTANT

## 2021-10-15 PROCEDURE — 71046 X-RAY EXAM CHEST 2 VIEWS: CPT

## 2021-10-15 PROCEDURE — 80053 COMPREHEN METABOLIC PANEL: CPT | Performed by: STUDENT IN AN ORGANIZED HEALTH CARE EDUCATION/TRAINING PROGRAM

## 2021-10-15 PROCEDURE — 86665 EPSTEIN-BARR CAPSID VCA: CPT | Performed by: PHYSICIAN ASSISTANT

## 2021-10-15 PROCEDURE — 36592 COLLECT BLOOD FROM PICC: CPT | Performed by: PHYSICIAN ASSISTANT

## 2021-10-15 PROCEDURE — 71046 X-RAY EXAM CHEST 2 VIEWS: CPT | Mod: 26 | Performed by: RADIOLOGY

## 2021-10-15 PROCEDURE — 86901 BLOOD TYPING SEROLOGIC RH(D): CPT | Performed by: PHYSICIAN ASSISTANT

## 2021-10-15 PROCEDURE — 87517 HEPATITIS B DNA QUANT: CPT | Performed by: PHYSICIAN ASSISTANT

## 2021-10-15 PROCEDURE — 82248 BILIRUBIN DIRECT: CPT | Performed by: PHYSICIAN ASSISTANT

## 2021-10-15 PROCEDURE — 87340 HEPATITIS B SURFACE AG IA: CPT | Performed by: PHYSICIAN ASSISTANT

## 2021-10-15 PROCEDURE — 999N000007 HC SITE CHECK

## 2021-10-15 PROCEDURE — 87516 HEPATITIS B DNA AMP PROBE: CPT | Performed by: PHYSICIAN ASSISTANT

## 2021-10-15 PROCEDURE — 250N000013 HC RX MED GY IP 250 OP 250 PS 637: Performed by: DENTIST

## 2021-10-15 PROCEDURE — 86706 HEP B SURFACE ANTIBODY: CPT | Performed by: PHYSICIAN ASSISTANT

## 2021-10-15 PROCEDURE — 86825 HLA X-MATH NON-CYTOTOXIC: CPT | Performed by: PHYSICIAN ASSISTANT

## 2021-10-15 PROCEDURE — 85610 PROTHROMBIN TIME: CPT | Performed by: PHYSICIAN ASSISTANT

## 2021-10-15 PROCEDURE — 84132 ASSAY OF SERUM POTASSIUM: CPT | Performed by: PHYSICIAN ASSISTANT

## 2021-10-15 PROCEDURE — 86704 HEP B CORE ANTIBODY TOTAL: CPT | Performed by: PHYSICIAN ASSISTANT

## 2021-10-15 PROCEDURE — 86832 HLA CLASS I HIGH DEFIN QUAL: CPT | Performed by: PHYSICIAN ASSISTANT

## 2021-10-15 PROCEDURE — 85730 THROMBOPLASTIN TIME PARTIAL: CPT | Performed by: PHYSICIAN ASSISTANT

## 2021-10-15 PROCEDURE — 36592 COLLECT BLOOD FROM PICC: CPT | Performed by: STUDENT IN AN ORGANIZED HEALTH CARE EDUCATION/TRAINING PROGRAM

## 2021-10-15 PROCEDURE — 85027 COMPLETE CBC AUTOMATED: CPT | Performed by: PHYSICIAN ASSISTANT

## 2021-10-15 PROCEDURE — 250N000012 HC RX MED GY IP 250 OP 636 PS 637: Performed by: PHYSICIAN ASSISTANT

## 2021-10-15 PROCEDURE — 250N000011 HC RX IP 250 OP 636: Performed by: INTERNAL MEDICINE

## 2021-10-15 PROCEDURE — U0005 INFEC AGEN DETEC AMPLI PROBE: HCPCS | Performed by: STUDENT IN AN ORGANIZED HEALTH CARE EDUCATION/TRAINING PROGRAM

## 2021-10-15 PROCEDURE — 84100 ASSAY OF PHOSPHORUS: CPT | Performed by: PHYSICIAN ASSISTANT

## 2021-10-15 PROCEDURE — 87536 HIV-1 QUANT&REVRSE TRNSCRPJ: CPT | Performed by: PHYSICIAN ASSISTANT

## 2021-10-15 PROCEDURE — 81001 URINALYSIS AUTO W/SCOPE: CPT | Performed by: PHYSICIAN ASSISTANT

## 2021-10-15 PROCEDURE — 94660 CPAP INITIATION&MGMT: CPT

## 2021-10-15 PROCEDURE — 999N000054 HC STATISTIC EKG NON-CHARGEABLE

## 2021-10-15 PROCEDURE — 82784 ASSAY IGA/IGD/IGG/IGM EACH: CPT | Performed by: PHYSICIAN ASSISTANT

## 2021-10-15 PROCEDURE — 86644 CMV ANTIBODY: CPT | Performed by: PHYSICIAN ASSISTANT

## 2021-10-15 PROCEDURE — 87522 HEPATITIS C REVRS TRNSCRPJ: CPT | Performed by: PHYSICIAN ASSISTANT

## 2021-10-15 PROCEDURE — 86803 HEPATITIS C AB TEST: CPT | Performed by: PHYSICIAN ASSISTANT

## 2021-10-15 PROCEDURE — 999N000157 HC STATISTIC RCP TIME EA 10 MIN

## 2021-10-15 PROCEDURE — 250N000013 HC RX MED GY IP 250 OP 250 PS 637: Performed by: INTERNAL MEDICINE

## 2021-10-15 PROCEDURE — 250N000012 HC RX MED GY IP 250 OP 636 PS 637: Performed by: STUDENT IN AN ORGANIZED HEALTH CARE EDUCATION/TRAINING PROGRAM

## 2021-10-15 PROCEDURE — 86833 HLA CLASS II HIGH DEFIN QUAL: CPT | Performed by: PHYSICIAN ASSISTANT

## 2021-10-15 PROCEDURE — 250N000012 HC RX MED GY IP 250 OP 636 PS 637: Performed by: INTERNAL MEDICINE

## 2021-10-15 RX ORDER — NOREPINEPHRINE BITARTRATE 0.06 MG/ML
.01-.6 INJECTION, SOLUTION INTRAVENOUS CONTINUOUS
Status: DISCONTINUED | OUTPATIENT
Start: 2021-10-15 | End: 2021-10-16 | Stop reason: HOSPADM

## 2021-10-15 RX ORDER — VANCOMYCIN HYDROCHLORIDE 1 G/200ML
1000 INJECTION, SOLUTION INTRAVENOUS
Status: DISCONTINUED | OUTPATIENT
Start: 2021-10-15 | End: 2021-10-16 | Stop reason: HOSPADM

## 2021-10-15 RX ORDER — LIDOCAINE 40 MG/G
CREAM TOPICAL
Status: DISCONTINUED | OUTPATIENT
Start: 2021-10-15 | End: 2021-10-16 | Stop reason: HOSPADM

## 2021-10-15 RX ORDER — VANCOMYCIN HYDROCHLORIDE 1 G/200ML
1000 INJECTION, SOLUTION INTRAVENOUS SEE ADMIN INSTRUCTIONS
Status: DISCONTINUED | OUTPATIENT
Start: 2021-10-15 | End: 2021-10-16 | Stop reason: HOSPADM

## 2021-10-15 RX ORDER — CEFTAZIDIME 1 G/1
1 INJECTION, POWDER, FOR SOLUTION INTRAMUSCULAR; INTRAVENOUS SEE ADMIN INSTRUCTIONS
Status: DISCONTINUED | OUTPATIENT
Start: 2021-10-15 | End: 2021-10-16 | Stop reason: HOSPADM

## 2021-10-15 RX ORDER — CEFTAZIDIME 1 G/1
1 INJECTION, POWDER, FOR SOLUTION INTRAMUSCULAR; INTRAVENOUS
Status: COMPLETED | OUTPATIENT
Start: 2021-10-15 | End: 2021-10-16

## 2021-10-15 RX ORDER — DEXMEDETOMIDINE HYDROCHLORIDE 4 UG/ML
0.2-1.2 INJECTION, SOLUTION INTRAVENOUS CONTINUOUS
Status: DISCONTINUED | OUTPATIENT
Start: 2021-10-15 | End: 2021-10-16 | Stop reason: HOSPADM

## 2021-10-15 RX ORDER — MYCOPHENOLATE MOFETIL 250 MG/1
1500 CAPSULE ORAL EVERY 12 HOURS
Status: DISCONTINUED | OUTPATIENT
Start: 2021-10-15 | End: 2021-10-16 | Stop reason: HOSPADM

## 2021-10-15 RX ADMIN — PANTOPRAZOLE SODIUM 40 MG: 40 TABLET, DELAYED RELEASE ORAL at 16:56

## 2021-10-15 RX ADMIN — LEVOTHYROXINE SODIUM 25 MCG: 0.03 TABLET ORAL at 08:15

## 2021-10-15 RX ADMIN — PREDNISONE 15 MG: 5 TABLET ORAL at 08:15

## 2021-10-15 RX ADMIN — Medication 5 MG: at 20:11

## 2021-10-15 RX ADMIN — ESCITALOPRAM OXALATE 10 MG: 5 TABLET, FILM COATED ORAL at 20:11

## 2021-10-15 RX ADMIN — FLUTICASONE FUROATE 1 PUFF: 100 POWDER RESPIRATORY (INHALATION) at 08:15

## 2021-10-15 RX ADMIN — SULFAMETHOXAZOLE AND TRIMETHOPRIM 1 TABLET: 800; 160 TABLET ORAL at 08:15

## 2021-10-15 RX ADMIN — INSULIN ASPART 7 UNITS: 100 INJECTION, SOLUTION INTRAVENOUS; SUBCUTANEOUS at 08:00

## 2021-10-15 RX ADMIN — MYCOPHENOLATE MOFETIL 1500 MG: 500 TABLET, FILM COATED ORAL at 08:15

## 2021-10-15 RX ADMIN — SODIUM CHLORIDE, PRESERVATIVE FREE 5 ML: 5 INJECTION INTRAVENOUS at 16:56

## 2021-10-15 RX ADMIN — AMLODIPINE BESYLATE 5 MG: 5 TABLET ORAL at 08:15

## 2021-10-15 RX ADMIN — AZITHROMYCIN MONOHYDRATE 250 MG: 250 TABLET ORAL at 08:15

## 2021-10-15 RX ADMIN — FLUTICASONE PROPIONATE 1 SPRAY: 50 SPRAY, METERED NASAL at 08:15

## 2021-10-15 RX ADMIN — HEPARIN SODIUM 5000 UNITS: 5000 INJECTION, SOLUTION INTRAVENOUS; SUBCUTANEOUS at 20:11

## 2021-10-15 RX ADMIN — PANTOPRAZOLE SODIUM 40 MG: 40 TABLET, DELAYED RELEASE ORAL at 08:15

## 2021-10-15 RX ADMIN — HEPARIN SODIUM 5000 UNITS: 5000 INJECTION, SOLUTION INTRAVENOUS; SUBCUTANEOUS at 13:20

## 2021-10-15 RX ADMIN — SODIUM CHLORIDE, PRESERVATIVE FREE 5 ML: 5 INJECTION INTRAVENOUS at 08:51

## 2021-10-15 RX ADMIN — MYCOPHENOLATE MOFETIL 1500 MG: 250 CAPSULE ORAL at 18:35

## 2021-10-15 ASSESSMENT — ACTIVITIES OF DAILY LIVING (ADL)
ADLS_ACUITY_SCORE: 10
ADLS_ACUITY_SCORE: 8
ADLS_ACUITY_SCORE: 8
ADLS_ACUITY_SCORE: 10
ADLS_ACUITY_SCORE: 8
ADLS_ACUITY_SCORE: 10

## 2021-10-15 ASSESSMENT — MIFFLIN-ST. JEOR: SCORE: 1459

## 2021-10-15 NOTE — PROGRESS NOTES
[facility]    Medicine Progress Note - Hospitalist Service, Gold 10       Date of Admission:  9/5/2021    Assessment & Plan          Edson Thornton is a 56 year old male with history of NSIP associated with rheumatoid arthritis and traction bronchiectasis who was admitted on 9/5/2021 with acute on chronic hypoxic respiratory failure likely from ILD. Weaning steroids now in preparation for transplant listing. 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    Today:  - Cont current management     Acute on chronic hypoxic respiratory failure  secondary to progressive ILD  Although the patient had tachypnea and leukocytosis on 9/16/2021, sepsis has been effectively ruled out.  2 view chest x-ray showed improvement in the patient's known interstitial lung disease.  - Appreciate Pulm recommendations   -Continue on stable BiPAP at night, HFNC during day  -Continue azithromycin as an anti-inflammatory  -Continue prednisone 20 mg daily (no plans for further taper)  -Continue MMF 1500 mg twice daily, per pulmonary  -IV diuresis 10/5 due to rising weight--no further diuresis 10/6-10/9      Lung transplant candidacy  -Perimolar abscess excision completed on 9/22/2021 in OR by dentistry. Discussed with dental team 9/24, no indication for antibiotics post tooth extraction   -Although the patient had indeterminate quanteferon gold, this was deemed to be low risk for active or latent tuberculosis based on evaluation of transplant infectious disease.  -The patient received ivermectin at 15 mg X1 dose for treatment of possible stronglydies based on recommendations of transplant ID  -Negative urinary coccidoses antigen   -The patient will need monthly coccidoses antigen for 12 months after transplant  - When transplanted please send, in addition to the lungs, a mediastinal LN for pathology, fungal cx and AFB smear and cx.   -CT chest w/o contrast with slightly improved  groundglass opacities (but still quite prominent)  -The patient will likely need to follow up with ID as an outpatient within 2 months of lung transplant to follow up on the urinary Cocci Ag and the LN and lungs biopsies.   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  Physical exam with chronic crusted over lesions throughout lower jaw.  Consulted to dermatology and jaw lesions HSV 2+ with initiation of acyclovir.  Preliminary plan to continue 3 times daily acyclovir for 10 days.  May need longer depending on immunosuppression/resolution of lesions.  -Consult dermatology, appreciate recs  -Continue acyclovir 400 mg 3 times daily x10 days completed 10/9   -No current need for precautions (not disseminated) at this time     Elevated Lactate - intermittent, resolved  Intermittently found to have elevated lactate after triggering sepsis due to WBC and HR. Usually resolved with minimal/no interventions (I.e. small fluid bolus). Unclear etiology- suspect due to high metabolic stress due to hypoxia vs spurrious from blood draw. Low suspicion for sepsis  -Continue to monitor clinically     Moderate (Non-severe) malnutrition in the context of chronic illness, all are POA  Malnutrition:    - Level of malnutrition: Non-Severe   - Based on: weight loss, mild (or greater) muscle loss  -Nutrition consulted  -Calorie counts, trend weights      Thrombocytopenia, resolved  Anemia- likely anemia of chronic disease. Baseline around 10-- where he has been while hospitalized.    Leukocytosis -waxing/waning. No other signs of acute infection. Continuing to monitor but suspect due to stress response, steroids. As above-- spoke again with ID on 10/8 and not thought to be  missing infection. Inflammatory marker trend is mild and is without fever/chills, increased dyspnea, diarrhea, vomiting, abdominal pain, chest pain, rash, joint pain.  --Cultures obtained, trending inflammatory markers  -Blood cultures NGTD  -Urine culture NGTD     Vitamin D deficiency, POA  -Continue vitamin D replacement     Rheumatoid arthritis  Patient seen by rheumatology this admission. No acute flare of rheumatoid arthritis. SINCERE antibodies negative, flow cytometry negative for CD19 cells, CK normal, aldolase normal. Anti-Irene-1 negative. The patient was treated with leflunomide from 5/21 to 7/21. Rheumatology considers that leflunomide toxicity might contribute to the patient's ILD.   -Continue cholestyramine TID to increase leflunomide clearance.      Steroid induced diabetes  -Continue insulin - decreased glargine to 6 units 10/5 due to borderline hypoglycemia  -Continue insulin NovoLog at medium insulin resistance     Chronic medical problems  Hypothyroidism   Continue 25 mg levothyroxine qday     SALTY  Continue BiPAP at night.      Hypertension  On 5mg amlodipine, BP well controlled .     Anxiety  Depression  Increase escitalopram to 10 mg       Diet: Snacks/Supplements Adult: Other; pt may order snacks/supplements PRN; Between Meals  Snacks/Supplements Adult: Ensure Enlive; With Meals  NPO per Anesthesia Guidelines for Procedure/Surgery Except for: Meds    DVT Prophylaxis: Heparin SQ  Watson Catheter: Not present  Central Lines: None  Code Status: Full Code      Disposition Plan   Expected discharge: 10/22/2021   recommended to TBD once receives lung transplant and recovers.     The patient's care was discussed with the Care Coordinator/, Patient and Pulmonary Transplant Consultant.    Ronnie Cervantes MD  Hospitalist Service, [address]  Securely message with the Vocera Web Console (learn more here)  Text page via [employer]  Paging/Directory  Please see sign in/sign out for up to date coverage information    Clinically Significant Risk Factors Present on Admission                ______________________________________________________________________    Interval History   No events overnight, did not sleep well overnight, anxious about transplant    Data reviewed today: I reviewed all medications, new labs and imaging results over the last 24 hours. I personally reviewed no images or EKG's today.    Physical Exam   Vital Signs: Temp: 97.8  F (36.6  C) Temp src: Oral BP: 96/70 Pulse: 87   Resp: 18 SpO2: 96 % O2 Device: Oxymizer cannula Oxygen Delivery: 15 LPM  Weight: 158 lbs 4.64 oz    Constitutional: Awake, alert, cooperative, no apparent distress  Respiratory: on HFNC  Cardiovascular: Regular rate and rhythm, normal S1 and S2, and no murmur noted, no edema  GI: Normal bowel sounds, soft, non-distended, non-tender  Skin/Integumen: No rashes, no cyanosis      Data   Recent Labs   Lab 10/15/21  1234 10/15/21  0916 10/15/21  0907 10/15/21  0723 10/15/21  0459 10/14/21  2319 10/14/21  1241 10/14/21  0943 10/14/21  0824 10/14/21  0533 10/12/21  1332 10/12/21  1038 10/10/21  1201 10/10/21  1024   WBC  --  22.9*  --   --   --   --   --  24.3*  --   --   --  28.5*   < > 26.5*   HGB  --  11.1*  --   --   --   --   --  11.8*  --   --   --  11.1*   < > 11.6*   MCV  --  92  --   --   --   --   --  93  --   --   --  91   < > 93   PLT  --  224  --   --   --   --   --  258  --   --   --  208   < > 221   INR  --   --  1.02  --   --   --   --   --   --   --   --   --   --   --    NA  --   --  137  --   --   --   --   --   --   --   --   --   --  139   POTASSIUM  --   --  3.4  --  3.6  --   --   --   --  3.6   < >  --    < > 3.6   CHLORIDE  --   --  105  --   --   --   --   --   --   --   --   --   --  104   CO2  --   --  25  --   --   --   --   --   --   --   --   --   --  29   BUN  --   --  14  --   --   --   --   --   --   --   --   --   --  19   CR   --   --  0.65*  --   --   --   --   --   --   --   --   --   --  0.69   ANIONGAP  --   --  7  --   --   --   --   --   --   --   --   --   --  6   ERIK  --   --  9.0  --   --   --   --   --   --   --   --   --   --  9.6   GLC 80  --  129* 81  --    < >   < >  --    < >  --    < >  --    < > 106*   ALBUMIN  --   --  2.9*  --   --   --   --   --   --   --   --   --   --   --    PROTTOTAL  --   --  6.3*  --   --   --   --   --   --   --   --   --   --   --    BILITOTAL  --   --  0.4  --   --   --   --   --   --   --   --   --   --   --    ALKPHOS  --   --  68  --   --   --   --   --   --   --   --   --   --   --    ALT  --   --  30  --   --   --   --   --   --   --   --   --   --   --    AST  --   --  16  --   --   --   --   --   --   --   --   --   --   --     < > = values in this interval not displayed.     Medications     - MEDICATION INSTRUCTIONS -       - MEDICATION INSTRUCTIONS -         amLODIPine  5 mg Oral Daily     azithromycin  250 mg Oral Daily     basiliximab (SIMULECT) infusion  20 mg Intravenous See Admin Instructions     cefTAZidime  1 g Intravenous Pre-Op/Pre-procedure x 1 dose     cefTAZidime  1 g Intravenous See Admin Instructions     escitalopram  10 mg Oral QPM     fluticasone  1 puff Inhalation Daily     fluticasone  1 spray Both Nostrils Daily     heparin ANTICOAGULANT  5,000 Units Subcutaneous Q12H     heparin lock flush  5-20 mL Intracatheter Q24H     insulin aspart   Subcutaneous TID AC     insulin aspart  1-7 Units Subcutaneous TID AC     insulin aspart  1-5 Units Subcutaneous At Bedtime     insulin glargine  6 Units Subcutaneous QAM AC     levothyroxine  25 mcg Oral QAM AC     melatonin  5 mg Oral At Bedtime     methylPREDNISolone  1,000 mg Intravenous Once     mycophenolate  1,500 mg Oral Q12H     mycophenolate  1,500 mg Oral BID IS     pantoprazole  40 mg Oral BID AC     predniSONE  15 mg Oral Daily     sodium chloride (PF)  10-40 mL Intracatheter Q8H     sodium chloride (PF)  3 mL  Intravenous Q8H     sodium chloride (PF)  3 mL Intracatheter Q8H     sulfamethoxazole-trimethoprim  1 tablet Oral Once per day on Mon Wed Fri     vancomycin  1,000 mg Intravenous Pre-Op/Pre-procedure x 1 dose     vancomycin  1,000 mg Intravenous See Admin Instructions     vitamin D2  50,000 Units Oral Q7 Days

## 2021-10-15 NOTE — PLAN OF CARE
"Neuro: A&Ox4.   Cardiac: ST  /84 (BP Location: Left arm)   Pulse 102   Temp 98.7  F (37.1  C) (Oral)   Resp 18   Ht 1.626 m (5' 4\")   Wt 71.4 kg (157 lb 6.5 oz)   SpO2 98%   BMI 27.02 kg/m      Respiratory: Sating 92-95 40%-60% HFNC  GI/: Adequate urine output. BM X1  Diet/appetite: Tolerating Reg diet. Eating well.  Activity:  Independent in room and SBA in halls.   Pain: At acceptable level on current regimen.   Skin: No new deficits noted.  LDA's: PICCx3 lumens heparin locked     Plan: Continue with POC. Notify primary team with changes.    "

## 2021-10-15 NOTE — CONSULTS
Health Psychology                                                                                                                          Moira King, Ph.D., L.P [phone]  Lola Arrington, Ph.D., L.P. [phone]  Janny Shepherd, Ph.D. [phone]  Carolyne Leiva, Ph.D., L.P. [phone]  Bennett Hardy, Ph.D., A.B.P.P., L.P. [phone]         Negin Maldonado, Ph.D., L.P. [phone]                [facility] [place], 3rd Floor  [address] [place] [address]    Inpatient Health Psychology Consult     Date of Service:  10/15/21    Stopped by Bret's room to follow-up after assessment on Wednesday.  His fiance's daughter was present and they informed me he is hopefully scheduled for transplant tomorrow.  Briefly reviewed experiences receiving news about transplant and how he is feeling currently.  He said he has experienced a range of emotions but is feeling hopeful and excited about the surgery.      Made plans to meet after surgery and assess psychological needs.     Janny Shepherd, PhD,   Health Psychology Fellow

## 2021-10-15 NOTE — PROGRESS NOTES
Transplant Social Work Services Progress Note      Date of Initial Social Work Evaluation: 9/92021  Collaborated with: accommodations     Data: patient called this morning.  He is tentatively going for transplant this afternoon.  He wishes to be placed on [facility] waitlist at this time as this is where he intends to stay post discharge.     Intervention: placed on waitlist.  He will be #3.  Likely one will open up by the time he is discharge.  Encouraged him to have his family get a hotel in the meantime. If transplant does not occur today, patient wishes to remain on the [facility] list for now, in anticipation that another offer will come soon.    Assessment: patient excited for potential transplant today.    Education provided by [facility]: housing options and [facility] rental process.    Plan:    Discharge Plans in Progress: too soon to assess    Barriers to d/c plan: To OR today for lung transplant    Follow up Plan: will continue to follow for support, counseling, education and resources.

## 2021-10-15 NOTE — PROGRESS NOTES
[facility]   Pulmonary   Progress Note  Edson Thornton MRN: [id number]  1965  Date of Admission:9/5/2021  Date of Service: 10/15/2021        Assessment & Plan    56-year-old male (BMI 27.6) with history of NSIP/ILD, RA (status post Rituxan infusion, leflunomide), transferred from OSH for acute on chronic hypoxemic respiratory failure refractory to treatment. Now listed for transplant and treating with MMF. Decreasing pred from 20 to 15mg per day on 10/12. Planning for transplant this afternoon.     1. Acute on chronic hypoxemic respiratory failure, stable  2. NSIP-ILD  3. Rheumatoid arthritis (positive anti-CCP, RF)     Recommendations    -Continue MMF 1500 mg BID, pred 15 mg qday, azithromycin  - Please see below based on prior notes regarding lung transplant planning:    Lung transplant candidacy  -The patient will need monthly coccidoses antigen for 12 months after transplant  - When transplanted please send, in addition to the lungs, a mediastinal LN for pathology, fungal cx and AFB smear and cx.   -The patient will likely need to follow up with ID as an outpatient within 2 months of lung transplant to follow up on the urinary Cocci Ag and the LN and lungs biopsies.   -Azathioprine to be avoided after transplant given low TPMT based on recommendations of transplant pulmonology, however if used, then azathioprine to be used in a low-dose       We will sign off if pt receives a transplant today.        Patient seen & discussed w/  Dr. Fidencio Weaver MD   Pulmonary/Critical Care Fellow   Pager #[phone]      Attending note:  Patient seen, examined and discussed with Dr. Weaver. All data reviewed. Agree with the assessment and plan as outlined in the above note.    Yuki Nicolas MD  [phone]        Interval History      RN notes reviewed, no acute events overnight. Pt tells us he's getting a lung transplant today. Looking forward to it. Otherwise had been feeling stable.      Five-point ROS is negative except for what is noted in the interval history.    Physical Exam Temp:  [97  F (36.1  C)-98.6  F (37  C)] 98.3  F (36.8  C)  Pulse:  [71-99] 96  Resp:  [18-20] 18  BP: ()/(70-90) 134/79  FiO2 (%):  [40 %] 40 %  SpO2:  [93 %-98 %] 97 %  I/O last 3 completed shifts:  In: 1472 [P.O.:1472]  Out: 1500 [Urine:1500]  Wt Readings from Last 1 Encounters:   10/15/21 71.8 kg (158 lb 4.6 oz)    Body mass index is 27.17 kg/m . FiO2 (%): 40 %  Resp: 18      Exam:  General:  Sitting upright on HFNC, cooperative, NAD.  HEENT: Anicteric sclera. EOMI.  Lungs: Bibasilar crackles; speaking in full sentences on HFNC 45% O2 at 30 LPM.   Abd: Soft, NT, ND  Ext: WWP,  No LE edema  Skin: No cyanosis, or jaundice  Neuro: AAOx3, no focal deficits    Data   Labs: reviewed in EMR and notable labs listed below.  CBC and BMP generally stable.     Imaging: reviewed in EMR and notable imaging listed below.    Chest X-Ray 2 views 9/23/21  IMPRESSION: Continued decrease in interstitial and airspace opacities,  compared to chest x-ray 9/19/2021. Enlarged cardiomediastinal  silhouette.    Chest CT high resolution 9/7/21:  IMPRESSION:   1. Extensive bilateral groundglass and reticular opacities are  slightly increased compared to chest CT from 8/30/2021. This likely  represents progression of known NSIP, however superimposed infection  or cannot be excluded.  2. Small bilateral pleural effusions.  3. Stable mediastinal lymphadenopathy, likely reactive.    CT 9/30/21  IMPRESSION: Overall slightly improved groundglass opacities and  organizing pneumonia pattern but still quite prominent. Mild  bronchiectasis. Minimal air trapping. Focal 9 mm left lower lobe  groundglass opacity was likely present before but is more conspicuous  due to the surrounding improved aeration currently. Small hiatal  hernia. Multiple nonenlarged mediastinal lymph nodes similar in  appearance.     Medications     amLODIPine  5 mg Oral Daily      azithromycin  250 mg Oral Daily     basiliximab (SIMULECT) infusion  20 mg Intravenous See Admin Instructions     cefTAZidime  1 g Intravenous Pre-Op/Pre-procedure x 1 dose     cefTAZidime  1 g Intravenous See Admin Instructions     escitalopram  10 mg Oral QPM     fluticasone  1 puff Inhalation Daily     fluticasone  1 spray Both Nostrils Daily     heparin ANTICOAGULANT  5,000 Units Subcutaneous Q12H     heparin lock flush  5-20 mL Intracatheter Q24H     insulin aspart   Subcutaneous TID AC     insulin aspart  1-7 Units Subcutaneous TID AC     insulin aspart  1-5 Units Subcutaneous At Bedtime     insulin glargine  6 Units Subcutaneous QAM AC     levothyroxine  25 mcg Oral QAM AC     melatonin  5 mg Oral At Bedtime     methylPREDNISolone  1,000 mg Intravenous Once     mycophenolate  1,500 mg Oral Q12H     mycophenolate  1,500 mg Oral BID IS     pantoprazole  40 mg Oral BID AC     predniSONE  15 mg Oral Daily     sodium chloride (PF)  10-40 mL Intracatheter Q8H     sodium chloride (PF)  3 mL Intravenous Q8H     sodium chloride (PF)  3 mL Intracatheter Q8H     sulfamethoxazole-trimethoprim  1 tablet Oral Once per day on Mon Wed Fri     vancomycin  1,000 mg Intravenous Pre-Op/Pre-procedure x 1 dose     vancomycin  1,000 mg Intravenous See Admin Instructions     vitamin D2  50,000 Units Oral Q7 Days

## 2021-10-15 NOTE — PLAN OF CARE
Neuro: A&Ox4.   Cardiac: SR. VSS.   Respiratory: Sating 96% on 15% oxi-plus, dyspneic with activities.   GI/: Adequate urine output. No Bm at this shift.   Diet/appetite: Tolerating regular diet. Eating well.NPO for possible lung transplant early tomorrow am.   Activity: independently up to chair and in halls.  Pain: At acceptable level on current regimen.   Skin: No new deficits noted.  LDA's: PICC patent and functioning.   Plan: Continue with POC. Notify primary team with changes.

## 2021-10-15 NOTE — ANESTHESIA PREPROCEDURE EVALUATION
Anesthesia Pre-Procedure Evaluation    Patient: Edson Thornton   MRN: [id number] : 1965        Preoperative Diagnosis: Rheumatoid disease (H) [M06.9]    Procedure : Procedure(s):  TRANSPLANT, LUNG, RECIPIENT, BILATERAL          Past Medical History:   Diagnosis Date     Anxiety      Depression      HLD (hyperlipidemia)      HTN (hypertension)      Hypothyroidism      ILD (interstitial lung disease) (H)      SALTY on CPAP      Oxygen dependent     BL 4L since ~2021     Rheumatoid arthritis (H)     signs ~2020, dx 2021     Steroid-induced hyperglycemia      Traction bronchiectasis (H)       Past Surgical History:   Procedure Laterality Date     COLONOSCOPY W/ BIOPSIES AND POLYPECTOMY  2020     CV CORONARY ANGIOGRAM N/A 2021    Procedure: Coronary Angiogram with possible intervention;  Surgeon: Jovon Bullock MD;  Location:  HEART CARDIAC CATH LAB     CV RIGHT HEART CATH MEASUREMENTS RECORDED N/A 2021    Procedure: Right Heart Cath;  Surgeon: Jovon Bullock MD;  Location:  HEART CARDIAC CATH LAB     EXTRACTION(S) DENTAL N/A 2021    Procedure: EXTRACTION tooth #19;  Surgeon: Deepak Tobin DDS;  Location: [facility] OR     HERNIA REPAIR       right acl       XR ACROMIOCLAVICULAR JOINT BILATERAL        No Known Allergies   Social History     Tobacco Use     Smoking status: Never Smoker     Smokeless tobacco: Never Used   Substance Use Topics     Alcohol use: Never      Wt Readings from Last 1 Encounters:   10/15/21 71.8 kg (158 lb 4.6 oz)        Anesthesia Evaluation   Pt has had prior anesthetic.         ROS/MED HX  ENT/Pulmonary: Comment: Admitted with acute on chronic hypoxemic respiratory failure in setting of NSIP-ILD    (+) sleep apnea,     Neurologic:       Cardiovascular: Comment: TTE:  Global and regional left ventricular function is normal with an EF of 60-65%.  Global right ventricular function is normal.  No significant valvular abnormalities present.  The patent  foramen ovale was demonstrated by color Doppler .  Estimated pulmonary artery systolic pressure is 40 mmHg. Pulmonary  hypertension is present.  The inferior vena cava was normal in size with preserved respiratory  variability.  No pericardial effusion is present.    R heart cath:    Right sided filling pressures are mildly elevated.    Moderately elevated pulmonary artery hypertension.    Left sided filling pressures are normal.    Normal cardiac output level.    Normal coronary arteries with mild tortuosity      (+) hypertension-----    METS/Exercise Tolerance: 1 - Eating, dressing    Hematologic:     (+) anemia,     Musculoskeletal:   (+) arthritis (RA),     GI/Hepatic:       Renal/Genitourinary:       Endo: Comment: Moderate (non-severe) malnutrition in the context of chronic illness    (+) thyroid problem, hypothyroidism,     Psychiatric/Substance Use:     (+) psychiatric history anxiety and depression     Infectious Disease: Comment: HSV s/p course of acyclovir      Malignancy:       Other:               OUTSIDE LABS:  CBC:   Lab Results   Component Value Date    WBC 22.9 (H) 10/15/2021    WBC 24.3 (H) 10/14/2021    HGB 11.1 (L) 10/15/2021    HGB 11.8 (L) 10/14/2021    HCT 34.3 (L) 10/15/2021    HCT 36.9 (L) 10/14/2021     10/15/2021     10/14/2021     BMP:   Lab Results   Component Value Date     10/15/2021     10/10/2021    POTASSIUM 3.4 10/15/2021    POTASSIUM 3.6 10/15/2021    CHLORIDE 105 10/15/2021    CHLORIDE 104 10/10/2021    CO2 25 10/15/2021    CO2 29 10/10/2021    BUN 14 10/15/2021    BUN 19 10/10/2021    CR 0.65 (L) 10/15/2021    CR 0.69 10/10/2021    GLC 80 10/15/2021     (H) 10/15/2021     COAGS:   Lab Results   Component Value Date    PTT 30 10/15/2021    INR 1.02 10/15/2021     POC: No results found for: BGM, HCG, HCGS  HEPATIC:   Lab Results   Component Value Date    ALBUMIN 2.9 (L) 10/15/2021    PROTTOTAL 6.3 (L) 10/15/2021    ALT 30 10/15/2021    AST 16  10/15/2021    ALKPHOS 68 10/15/2021    BILITOTAL 0.4 10/15/2021     OTHER:   Lab Results   Component Value Date    PH 7.48 (H) 09/05/2021    LACT 0.5 (L) 10/14/2021    A1C 6.6 (H) 09/07/2021    ERIK 9.0 10/15/2021    PHOS 2.6 10/15/2021    MAG 1.9 10/15/2021    AMYLASE 32 09/07/2021    TSH 0.11 (L) 09/07/2021    T4 0.68 (L) 09/07/2021    CRP 23.0 (H) 10/14/2021       Anesthesia Plan    ASA Status:  4      Anesthesia Type: General (ETT).     - Airway: ETT   Induction: Intravenous.   Maintenance: Balanced.   Techniques and Equipment:     - Airway: Video-Laryngoscope, Fiberoptic Bronchoscope     - Lines/Monitors: 2nd IV, Arterial Line, Central Line, PAC, CVP, BIS, NIRS, TC     - Blood: Blood in Room     - Drips/Meds: Norepi, Epinephrine, Vasopressin     Consents            Postoperative Care    Pain management: Multi-modal analgesia.        Comments:                Ricardo Roca MD

## 2021-10-15 NOTE — PLAN OF CARE
Neuro: A&Ox4.   Cardiac: SR, ST with activity. VSS.   Respiratory: On HFNC at 40% during the day, BiPAP at 40% FiO2 while sleeping with sats > 92%.   GI/: Adequate urine output. No stool this shift. Denies nausea.   Diet/appetite: Tolerating regular diet. Eating well. Carb coverage with meals.   Activity: Independent in room.   Pain: At acceptable level on current regimen. Tylenol x1 for shoulder pain with relief.   Skin: No new deficits noted.  LDA's: TL PICC, heparin locked.     Plan: Weekly Covid swab sent this AM. Possible lung txp today? Continue with POC. Notify primary team with changes.

## 2021-10-15 NOTE — PROGRESS NOTES
56 M with ILD related to RA. Hospitalized since early September with deteriorating pulmonary function. Listed for transplant ~18 days ago.    A suitable donor is available, and lungs tentatively accepted for donor OR early tomorrow AM.    I met with the patient and explained the risks of transplantation including, but not limited to: post operative ECMO, graft dysfunction, myocardial infarction, renal dysfunction, dialysis, bleeding, pneumonia, infection, prolonged ventilation or hospitalization, stroke, and death. The patient expressed understanding and was willing to proceed.     Informed consent obtained and placed in the chart.        Yanick Corral MD

## 2021-10-16 ENCOUNTER — APPOINTMENT (OUTPATIENT)
Dept: GENERAL RADIOLOGY | Facility: CLINIC | Age: 56
End: 2021-10-16
Attending: THORACIC SURGERY (CARDIOTHORACIC VASCULAR SURGERY)
Payer: COMMERCIAL

## 2021-10-16 ENCOUNTER — APPOINTMENT (OUTPATIENT)
Dept: GENERAL RADIOLOGY | Facility: CLINIC | Age: 56
End: 2021-10-16
Attending: STUDENT IN AN ORGANIZED HEALTH CARE EDUCATION/TRAINING PROGRAM
Payer: COMMERCIAL

## 2021-10-16 ENCOUNTER — APPOINTMENT (OUTPATIENT)
Dept: GENERAL RADIOLOGY | Facility: CLINIC | Age: 56
End: 2021-10-16
Attending: INTERNAL MEDICINE
Payer: COMMERCIAL

## 2021-10-16 LAB
ALBUMIN SERPL-MCNC: 1.7 G/DL (ref 3.4–5)
ALBUMIN SERPL-MCNC: 1.7 G/DL (ref 3.4–5)
ALP SERPL-CCNC: 59 U/L (ref 40–150)
ALP SERPL-CCNC: 62 U/L (ref 40–150)
ALT SERPL W P-5'-P-CCNC: 212 U/L (ref 0–70)
ALT SERPL W P-5'-P-CCNC: 38 U/L (ref 0–70)
ANION GAP SERPL CALCULATED.3IONS-SCNC: 11 MMOL/L (ref 3–14)
ANION GAP SERPL CALCULATED.3IONS-SCNC: 16 MMOL/L (ref 3–14)
APTT PPP: 42 SECONDS (ref 22–38)
APTT PPP: 51 SECONDS (ref 22–38)
AST SERPL W P-5'-P-CCNC: 123 U/L (ref 0–45)
AST SERPL W P-5'-P-CCNC: 230 U/L (ref 0–45)
BASE EXCESS BLDA CALC-SCNC: -0.5 MMOL/L (ref -9.6–2)
BASE EXCESS BLDA CALC-SCNC: -1 MMOL/L (ref -9.6–2)
BASE EXCESS BLDA CALC-SCNC: -10.9 MMOL/L (ref -9–1.8)
BASE EXCESS BLDA CALC-SCNC: -13.6 MMOL/L (ref -9–1.8)
BASE EXCESS BLDA CALC-SCNC: -2.3 MMOL/L (ref -9.6–2)
BASE EXCESS BLDA CALC-SCNC: -2.7 MMOL/L (ref -9.6–2)
BASE EXCESS BLDA CALC-SCNC: -4.1 MMOL/L (ref -9.6–2)
BASE EXCESS BLDA CALC-SCNC: -4.8 MMOL/L (ref -9–1.8)
BASE EXCESS BLDA CALC-SCNC: -5.9 MMOL/L (ref -9.6–2)
BASE EXCESS BLDA CALC-SCNC: -6.6 MMOL/L (ref -9–1.8)
BASE EXCESS BLDA CALC-SCNC: -7.7 MMOL/L (ref -9–1.8)
BASE EXCESS BLDA CALC-SCNC: -8 MMOL/L (ref -9–1.8)
BASE EXCESS BLDA CALC-SCNC: 2.1 MMOL/L (ref -9.6–2)
BASE EXCESS BLDA CALC-SCNC: 3.4 MMOL/L (ref -9.6–2)
BASE EXCESS BLDA CALC-SCNC: 4 MMOL/L (ref -9.6–2)
BASE EXCESS BLDV CALC-SCNC: -12.6 MMOL/L (ref -7.7–1.9)
BASE EXCESS BLDV CALC-SCNC: -4.6 MMOL/L (ref -7.7–1.9)
BASE EXCESS BLDV CALC-SCNC: -6.3 MMOL/L (ref -7.7–1.9)
BASE EXCESS BLDV CALC-SCNC: -7.2 MMOL/L (ref -7.7–1.9)
BASE EXCESS BLDV CALC-SCNC: -9 MMOL/L (ref -7.7–1.9)
BASE EXCESS BLDV CALC-SCNC: 4.2 MMOL/L (ref -8.1–1.9)
BASOPHILS # BLD MANUAL: 0 10E3/UL (ref 0–0.2)
BASOPHILS NFR BLD MANUAL: 0 %
BILIRUB SERPL-MCNC: 1.4 MG/DL (ref 0.2–1.3)
BILIRUB SERPL-MCNC: 1.5 MG/DL (ref 0.2–1.3)
BLD PROD TYP BPU: NORMAL
BLD PROD TYP BPU: NORMAL
BLOOD COMPONENT TYPE: NORMAL
BLOOD COMPONENT TYPE: NORMAL
BUN SERPL-MCNC: 14 MG/DL (ref 7–30)
BUN SERPL-MCNC: 17 MG/DL (ref 7–30)
BURR CELLS BLD QL SMEAR: ABNORMAL
CA-I BLD-MCNC: 3.5 MG/DL (ref 4.4–5.2)
CA-I BLD-MCNC: 3.6 MG/DL (ref 4.4–5.2)
CA-I BLD-MCNC: 4 MG/DL (ref 4.4–5.2)
CA-I BLD-MCNC: 4 MG/DL (ref 4.4–5.2)
CA-I BLD-MCNC: 4.1 MG/DL (ref 4.4–5.2)
CA-I BLD-MCNC: 4.5 MG/DL (ref 4.4–5.2)
CA-I BLD-MCNC: 4.5 MG/DL (ref 4.4–5.2)
CA-I BLD-MCNC: 4.6 MG/DL (ref 4.4–5.2)
CA-I BLD-MCNC: 4.8 MG/DL (ref 4.4–5.2)
CA-I BLD-MCNC: 4.9 MG/DL (ref 4.4–5.2)
CA-I BLD-MCNC: 5 MG/DL (ref 4.4–5.2)
CALCIUM SERPL-MCNC: 7.7 MG/DL (ref 8.5–10.1)
CALCIUM SERPL-MCNC: 7.9 MG/DL (ref 8.5–10.1)
CF REDUC 60M P MA LENFR BLD TEG: ABNORMAL %
CFT BLD TEG: 1.2 MINUTE (ref 1–3)
CHLORIDE BLD-SCNC: 108 MMOL/L (ref 94–109)
CHLORIDE BLD-SCNC: 109 MMOL/L (ref 94–109)
CI (COAGULATION INDEX)(Z) NON NATIVE: 2.5 (ref -3–3)
CLOT ANGLE BLD TEG: 73.4 DEGREES (ref 53–72)
CLOT INIT BLD TEG: 6.3 MINUTE (ref 5–10)
CLOT LYSIS 30M P MA LENFR BLD TEG: ABNORMAL %
CLOT STRENGTH BLD TEG: 15.6 KD/SC (ref 4.5–11)
CO2 SERPL-SCNC: 17 MMOL/L (ref 20–32)
CO2 SERPL-SCNC: 21 MMOL/L (ref 20–32)
CODING SYSTEM: NORMAL
CODING SYSTEM: NORMAL
CREAT SERPL-MCNC: 0.93 MG/DL (ref 0.66–1.25)
CREAT SERPL-MCNC: 1.31 MG/DL (ref 0.66–1.25)
CROSSMATCH: NORMAL
CROSSMATCH: NORMAL
EOSINOPHIL # BLD MANUAL: 0 10E3/UL (ref 0–0.7)
EOSINOPHIL NFR BLD MANUAL: 0 %
ERYTHROCYTE [DISTWIDTH] IN BLOOD BY AUTOMATED COUNT: 16.5 % (ref 10–15)
ERYTHROCYTE [DISTWIDTH] IN BLOOD BY AUTOMATED COUNT: 16.8 % (ref 10–15)
GFR SERPL CREATININE-BSD FRML MDRD: 60 ML/MIN/1.73M2
GFR SERPL CREATININE-BSD FRML MDRD: >90 ML/MIN/1.73M2
GLUCOSE BLD-MCNC: 109 MG/DL (ref 70–99)
GLUCOSE BLD-MCNC: 130 MG/DL (ref 70–99)
GLUCOSE BLD-MCNC: 151 MG/DL (ref 70–99)
GLUCOSE BLD-MCNC: 157 MG/DL (ref 70–99)
GLUCOSE BLD-MCNC: 188 MG/DL (ref 70–99)
GLUCOSE BLD-MCNC: 191 MG/DL (ref 70–99)
GLUCOSE BLD-MCNC: 191 MG/DL (ref 70–99)
GLUCOSE BLD-MCNC: 195 MG/DL (ref 70–99)
GLUCOSE BLD-MCNC: 204 MG/DL (ref 70–99)
GLUCOSE BLD-MCNC: 210 MG/DL (ref 70–99)
GLUCOSE BLD-MCNC: 212 MG/DL (ref 70–99)
GLUCOSE BLD-MCNC: 214 MG/DL (ref 70–99)
GLUCOSE BLDC GLUCOMTR-MCNC: 105 MG/DL (ref 70–99)
GLUCOSE BLDC GLUCOMTR-MCNC: 153 MG/DL (ref 70–99)
GLUCOSE BLDC GLUCOMTR-MCNC: 162 MG/DL (ref 70–99)
GLUCOSE BLDC GLUCOMTR-MCNC: 162 MG/DL (ref 70–99)
GLUCOSE BLDC GLUCOMTR-MCNC: 179 MG/DL (ref 70–99)
GLUCOSE BLDC GLUCOMTR-MCNC: 180 MG/DL (ref 70–99)
GLUCOSE BLDC GLUCOMTR-MCNC: 190 MG/DL (ref 70–99)
GLUCOSE BLDC GLUCOMTR-MCNC: 202 MG/DL (ref 70–99)
GRAM STAIN RESULT: NORMAL
HCO3 BLD-SCNC: 15 MMOL/L (ref 21–28)
HCO3 BLD-SCNC: 16 MMOL/L (ref 21–28)
HCO3 BLD-SCNC: 18 MMOL/L (ref 21–28)
HCO3 BLD-SCNC: 19 MMOL/L (ref 21–28)
HCO3 BLD-SCNC: 20 MMOL/L (ref 21–28)
HCO3 BLD-SCNC: 20 MMOL/L (ref 21–28)
HCO3 BLDA-SCNC: 22 MMOL/L (ref 21–28)
HCO3 BLDA-SCNC: 23 MMOL/L (ref 21–28)
HCO3 BLDA-SCNC: 25 MMOL/L (ref 21–28)
HCO3 BLDA-SCNC: 27 MMOL/L (ref 21–28)
HCO3 BLDA-SCNC: 28 MMOL/L (ref 21–28)
HCO3 BLDA-SCNC: 28 MMOL/L (ref 21–28)
HCO3 BLDA-SCNC: 29 MMOL/L (ref 21–28)
HCO3 BLDV-SCNC: 17 MMOL/L (ref 21–28)
HCO3 BLDV-SCNC: 19 MMOL/L (ref 21–28)
HCO3 BLDV-SCNC: 21 MMOL/L (ref 21–28)
HCO3 BLDV-SCNC: 22 MMOL/L (ref 21–28)
HCO3 BLDV-SCNC: 24 MMOL/L (ref 21–28)
HCO3 BLDV-SCNC: 30 MMOL/L (ref 21–28)
HCT VFR BLD AUTO: 36.4 % (ref 40–53)
HCT VFR BLD AUTO: 37.8 % (ref 40–53)
HGB BLD-MCNC: 11 G/DL (ref 13.3–17.7)
HGB BLD-MCNC: 11.7 G/DL (ref 13.3–17.7)
HGB BLD-MCNC: 11.8 G/DL (ref 13.3–17.7)
HGB BLD-MCNC: 12.2 G/DL (ref 13.3–17.7)
HGB BLD-MCNC: 12.3 G/DL (ref 13.3–17.7)
HGB BLD-MCNC: 7.5 G/DL (ref 13.3–17.7)
HGB BLD-MCNC: 7.8 G/DL (ref 13.3–17.7)
HGB BLD-MCNC: 8.7 G/DL (ref 13.3–17.7)
HGB BLD-MCNC: 8.9 G/DL (ref 13.3–17.7)
HGB BLD-MCNC: 8.9 G/DL (ref 13.3–17.7)
HGB BLD-MCNC: 9.5 G/DL (ref 13.3–17.7)
HGB BLD-MCNC: 9.7 G/DL (ref 13.3–17.7)
INR PPP: 1.38 (ref 0.85–1.15)
INR PPP: 1.54 (ref 0.85–1.15)
ISSUE DATE AND TIME: NORMAL
ISSUE DATE AND TIME: NORMAL
LACTATE BLD-SCNC: 0.6 MMOL/L
LACTATE BLD-SCNC: 1 MMOL/L
LACTATE BLD-SCNC: 1.3 MMOL/L
LACTATE BLD-SCNC: 1.4 MMOL/L
LACTATE BLD-SCNC: 1.8 MMOL/L
LACTATE BLD-SCNC: 2.2 MMOL/L
LACTATE BLD-SCNC: 2.7 MMOL/L
LACTATE BLD-SCNC: 2.7 MMOL/L
LACTATE BLD-SCNC: 3.7 MMOL/L
LACTATE BLD-SCNC: 4.2 MMOL/L
LACTATE SERPL-SCNC: 4.9 MMOL/L (ref 0.7–2)
LACTATE SERPL-SCNC: 7.5 MMOL/L (ref 0.7–2)
LACTATE SERPL-SCNC: 8 MMOL/L (ref 0.7–2)
LACTATE SERPL-SCNC: 8.2 MMOL/L (ref 0.7–2)
LACTATE SERPL-SCNC: 8.3 MMOL/L (ref 0.7–2)
LACTATE SERPL-SCNC: 9 MMOL/L (ref 0.7–2)
LACTATE SERPL-SCNC: 9.1 MMOL/L (ref 0.7–2)
LYMPHOCYTES # BLD MANUAL: 1.4 10E3/UL (ref 0.8–5.3)
LYMPHOCYTES NFR BLD MANUAL: 2 %
MAGNESIUM SERPL-MCNC: 2.5 MG/DL (ref 1.6–2.3)
MAGNESIUM SERPL-MCNC: 2.7 MG/DL (ref 1.6–2.3)
MCF BLD TEG: 75.8 MM (ref 50–70)
MCH RBC QN AUTO: 30 PG (ref 26.5–33)
MCH RBC QN AUTO: 30.2 PG (ref 26.5–33)
MCHC RBC AUTO-ENTMCNC: 32.3 G/DL (ref 31.5–36.5)
MCHC RBC AUTO-ENTMCNC: 32.4 G/DL (ref 31.5–36.5)
MCV RBC AUTO: 93 FL (ref 78–100)
MCV RBC AUTO: 93 FL (ref 78–100)
METAMYELOCYTES # BLD MANUAL: 2.1 10E3/UL
METAMYELOCYTES NFR BLD MANUAL: 3 %
MONOCYTES # BLD MANUAL: 2.8 10E3/UL (ref 0–1.3)
MONOCYTES NFR BLD MANUAL: 4 %
MRSA DNA SPEC QL NAA+PROBE: NEGATIVE
MYELOCYTES # BLD MANUAL: 2.1 10E3/UL
MYELOCYTES NFR BLD MANUAL: 3 %
NEUTROPHILS # BLD MANUAL: 61.2 10E3/UL (ref 1.6–8.3)
NEUTROPHILS NFR BLD MANUAL: 88 %
NRBC # BLD AUTO: 0.7 10E3/UL
NRBC BLD MANUAL-RTO: 1 %
O2/TOTAL GAS SETTING VFR VENT: 100 %
O2/TOTAL GAS SETTING VFR VENT: 100 %
O2/TOTAL GAS SETTING VFR VENT: 60 %
O2/TOTAL GAS SETTING VFR VENT: 70 %
O2/TOTAL GAS SETTING VFR VENT: 90 %
OXYHGB MFR BLD: 94 % (ref 92–100)
OXYHGB MFR BLD: 95 % (ref 92–100)
OXYHGB MFR BLD: 96 % (ref 92–100)
OXYHGB MFR BLDA: 96 % (ref 92–100)
OXYHGB MFR BLDA: 96 % (ref 92–100)
OXYHGB MFR BLDA: 97 % (ref 92–100)
OXYHGB MFR BLDV: 44 % (ref 70–75)
OXYHGB MFR BLDV: 46 % (ref 70–75)
OXYHGB MFR BLDV: 47 % (ref 70–75)
OXYHGB MFR BLDV: 47 % (ref 70–75)
OXYHGB MFR BLDV: 71 % (ref 92–100)
PCO2 BLD: 34 MM HG (ref 35–45)
PCO2 BLD: 35 MM HG (ref 35–45)
PCO2 BLD: 35 MM HG (ref 35–45)
PCO2 BLD: 40 MM HG (ref 35–45)
PCO2 BLD: 46 MM HG (ref 35–45)
PCO2 BLD: 50 MM HG (ref 35–45)
PCO2 BLDA: 44 MM HG (ref 35–45)
PCO2 BLDA: 44 MM HG (ref 35–45)
PCO2 BLDA: 45 MM HG (ref 35–45)
PCO2 BLDA: 46 MM HG (ref 35–45)
PCO2 BLDA: 47 MM HG (ref 35–45)
PCO2 BLDA: 53 MM HG (ref 35–45)
PCO2 BLDA: 54 MM HG (ref 35–45)
PCO2 BLDA: 57 MM HG (ref 35–45)
PCO2 BLDA: 75 MM HG (ref 35–45)
PCO2 BLDV: 46 MM HG (ref 40–50)
PCO2 BLDV: 50 MM HG (ref 40–50)
PCO2 BLDV: 51 MM HG (ref 40–50)
PCO2 BLDV: 51 MM HG (ref 40–50)
PCO2 BLDV: 55 MM HG (ref 40–50)
PCO2 BLDV: 67 MM HG (ref 40–50)
PH BLD: 7.13 [PH] (ref 7.35–7.45)
PH BLD: 7.21 [PH] (ref 7.35–7.45)
PH BLD: 7.25 [PH] (ref 7.35–7.45)
PH BLD: 7.28 [PH] (ref 7.35–7.45)
PH BLD: 7.34 [PH] (ref 7.35–7.45)
PH BLD: 7.37 [PH] (ref 7.35–7.45)
PH BLDA: 7.16 [PH] (ref 7.35–7.45)
PH BLDA: 7.23 [PH] (ref 7.35–7.45)
PH BLDA: 7.25 [PH] (ref 7.35–7.45)
PH BLDA: 7.26 [PH] (ref 7.35–7.45)
PH BLDA: 7.35 [PH] (ref 7.35–7.45)
PH BLDA: 7.36 [PH] (ref 7.35–7.45)
PH BLDA: 7.39 [PH] (ref 7.35–7.45)
PH BLDA: 7.41 [PH] (ref 7.35–7.45)
PH BLDA: 7.42 [PH] (ref 7.35–7.45)
PH BLDV: 7.1 [PH] (ref 7.32–7.43)
PH BLDV: 7.15 [PH] (ref 7.32–7.43)
PH BLDV: 7.19 [PH] (ref 7.32–7.43)
PH BLDV: 7.22 [PH] (ref 7.32–7.43)
PH BLDV: 7.29 [PH] (ref 7.32–7.43)
PH BLDV: 7.38 [PH] (ref 7.32–7.43)
PHOSPHATE SERPL-MCNC: 7.2 MG/DL (ref 2.5–4.5)
PHOSPHATE SERPL-MCNC: 7.7 MG/DL (ref 2.5–4.5)
PLAT MORPH BLD: ABNORMAL
PLAT MORPH BLD: NORMAL
PLATELET # BLD AUTO: 238 10E3/UL (ref 150–450)
PLATELET # BLD AUTO: 250 10E3/UL (ref 150–450)
PO2 BLD: 102 MM HG (ref 80–105)
PO2 BLD: 115 MM HG (ref 80–105)
PO2 BLD: 119 MM HG (ref 80–105)
PO2 BLD: 227 MM HG (ref 80–105)
PO2 BLD: 75 MM HG (ref 80–105)
PO2 BLD: 94 MM HG (ref 80–105)
PO2 BLDA: 154 MM HG (ref 80–105)
PO2 BLDA: 214 MM HG (ref 80–105)
PO2 BLDA: 257 MM HG (ref 80–105)
PO2 BLDA: 258 MM HG (ref 80–105)
PO2 BLDA: 283 MM HG (ref 80–105)
PO2 BLDA: 310 MM HG (ref 80–105)
PO2 BLDA: 312 MM HG (ref 80–105)
PO2 BLDA: 387 MM HG (ref 80–105)
PO2 BLDA: 408 MM HG (ref 80–105)
PO2 BLDV: 30 MM HG (ref 25–47)
PO2 BLDV: 31 MM HG (ref 25–47)
PO2 BLDV: 32 MM HG (ref 25–47)
PO2 BLDV: 35 MM HG (ref 25–47)
PO2 BLDV: 38 MM HG (ref 25–47)
PO2 BLDV: 42 MM HG (ref 25–47)
POLYCHROMASIA BLD QL SMEAR: SLIGHT
POTASSIUM BLD-SCNC: 3.3 MMOL/L (ref 3.4–5.3)
POTASSIUM BLD-SCNC: 3.6 MMOL/L (ref 3.5–5)
POTASSIUM BLD-SCNC: 3.7 MMOL/L (ref 3.5–5)
POTASSIUM BLD-SCNC: 3.9 MMOL/L (ref 3.5–5)
POTASSIUM BLD-SCNC: 4.1 MMOL/L (ref 3.4–5.3)
POTASSIUM BLD-SCNC: 4.1 MMOL/L (ref 3.5–5)
POTASSIUM BLD-SCNC: 4.1 MMOL/L (ref 3.5–5)
POTASSIUM BLD-SCNC: 4.2 MMOL/L (ref 3.5–5)
POTASSIUM BLD-SCNC: 4.3 MMOL/L (ref 3.5–5)
POTASSIUM BLD-SCNC: 4.4 MMOL/L (ref 3.5–5)
POTASSIUM BLD-SCNC: 4.8 MMOL/L (ref 3.4–5.3)
PROT SERPL-MCNC: 4.3 G/DL (ref 6.8–8.8)
PROT SERPL-MCNC: 4.4 G/DL (ref 6.8–8.8)
RBC # BLD AUTO: 3.91 10E6/UL (ref 4.4–5.9)
RBC # BLD AUTO: 4.06 10E6/UL (ref 4.4–5.9)
RBC MORPH BLD: ABNORMAL
RBC MORPH BLD: NORMAL
SA TARGET DNA: NEGATIVE
SODIUM BLD-SCNC: 138 MMOL/L (ref 133–144)
SODIUM BLD-SCNC: 139 MMOL/L (ref 133–144)
SODIUM BLD-SCNC: 140 MMOL/L (ref 133–144)
SODIUM BLD-SCNC: 141 MMOL/L (ref 133–144)
SODIUM SERPL-SCNC: 140 MMOL/L (ref 133–144)
SODIUM SERPL-SCNC: 142 MMOL/L (ref 133–144)
UNIT ABO/RH: NORMAL
UNIT ABO/RH: NORMAL
UNIT NUMBER: NORMAL
UNIT NUMBER: NORMAL
UNIT STATUS: NORMAL
UNIT STATUS: NORMAL
UNIT TYPE ISBT: 6200
UNIT TYPE ISBT: 6200
WBC # BLD AUTO: 60 10E3/UL (ref 4–11)
WBC # BLD AUTO: 69.5 10E3/UL (ref 4–11)

## 2021-10-16 PROCEDURE — 74018 RADEX ABDOMEN 1 VIEW: CPT | Mod: 26 | Performed by: RADIOLOGY

## 2021-10-16 PROCEDURE — 94002 VENT MGMT INPAT INIT DAY: CPT

## 2021-10-16 PROCEDURE — 87205 SMEAR GRAM STAIN: CPT | Performed by: THORACIC SURGERY (CARDIOTHORACIC VASCULAR SURGERY)

## 2021-10-16 PROCEDURE — 87206 SMEAR FLUORESCENT/ACID STAI: CPT | Performed by: THORACIC SURGERY (CARDIOTHORACIC VASCULAR SURGERY)

## 2021-10-16 PROCEDURE — 250N000009 HC RX 250: Performed by: NURSE PRACTITIONER

## 2021-10-16 PROCEDURE — 87102 FUNGUS ISOLATION CULTURE: CPT | Performed by: THORACIC SURGERY (CARDIOTHORACIC VASCULAR SURGERY)

## 2021-10-16 PROCEDURE — 71045 X-RAY EXAM CHEST 1 VIEW: CPT

## 2021-10-16 PROCEDURE — 250N000011 HC RX IP 250 OP 636: Performed by: PHYSICIAN ASSISTANT

## 2021-10-16 PROCEDURE — 84132 ASSAY OF SERUM POTASSIUM: CPT

## 2021-10-16 PROCEDURE — 0BTM0ZZ RESECTION OF BILATERAL LUNGS, OPEN APPROACH: ICD-10-PCS | Performed by: THORACIC SURGERY (CARDIOTHORACIC VASCULAR SURGERY)

## 2021-10-16 PROCEDURE — 258N000003 HC RX IP 258 OP 636: Performed by: STUDENT IN AN ORGANIZED HEALTH CARE EDUCATION/TRAINING PROGRAM

## 2021-10-16 PROCEDURE — 812N000008 HC ACQUISITION LUNG CADAVER

## 2021-10-16 PROCEDURE — 85610 PROTHROMBIN TIME: CPT | Performed by: STUDENT IN AN ORGANIZED HEALTH CARE EDUCATION/TRAINING PROGRAM

## 2021-10-16 PROCEDURE — 87252 VIRUS INOCULATION TISSUE: CPT | Performed by: THORACIC SURGERY (CARDIOTHORACIC VASCULAR SURGERY)

## 2021-10-16 PROCEDURE — 82803 BLOOD GASES ANY COMBINATION: CPT | Performed by: SURGERY

## 2021-10-16 PROCEDURE — 87070 CULTURE OTHR SPECIMN AEROBIC: CPT | Performed by: THORACIC SURGERY (CARDIOTHORACIC VASCULAR SURGERY)

## 2021-10-16 PROCEDURE — 250N000013 HC RX MED GY IP 250 OP 250 PS 637: Performed by: NURSE PRACTITIONER

## 2021-10-16 PROCEDURE — 93010 ELECTROCARDIOGRAM REPORT: CPT | Mod: 59 | Performed by: INTERNAL MEDICINE

## 2021-10-16 PROCEDURE — 94640 AIRWAY INHALATION TREATMENT: CPT

## 2021-10-16 PROCEDURE — 250N000012 HC RX MED GY IP 250 OP 636 PS 637: Performed by: NURSE PRACTITIONER

## 2021-10-16 PROCEDURE — 250N000009 HC RX 250: Performed by: STUDENT IN AN ORGANIZED HEALTH CARE EDUCATION/TRAINING PROGRAM

## 2021-10-16 PROCEDURE — 258N000003 HC RX IP 258 OP 636: Performed by: SURGERY

## 2021-10-16 PROCEDURE — 88309 TISSUE EXAM BY PATHOLOGIST: CPT | Mod: 26 | Performed by: PATHOLOGY

## 2021-10-16 PROCEDURE — 82805 BLOOD GASES W/O2 SATURATION: CPT | Performed by: ANESTHESIOLOGY

## 2021-10-16 PROCEDURE — 360N000079 HC SURGERY LEVEL 6, PER MIN: Performed by: THORACIC SURGERY (CARDIOTHORACIC VASCULAR SURGERY)

## 2021-10-16 PROCEDURE — 88307 TISSUE EXAM BY PATHOLOGIST: CPT | Mod: 26 | Performed by: PATHOLOGY

## 2021-10-16 PROCEDURE — 84155 ASSAY OF PROTEIN SERUM: CPT | Performed by: SURGERY

## 2021-10-16 PROCEDURE — 86923 COMPATIBILITY TEST ELECTRIC: CPT | Performed by: INTERNAL MEDICINE

## 2021-10-16 PROCEDURE — 258N000003 HC RX IP 258 OP 636: Performed by: ANESTHESIOLOGY

## 2021-10-16 PROCEDURE — 94799 UNLISTED PULMONARY SVC/PX: CPT

## 2021-10-16 PROCEDURE — 83605 ASSAY OF LACTIC ACID: CPT

## 2021-10-16 PROCEDURE — 250N000009 HC RX 250: Performed by: SURGERY

## 2021-10-16 PROCEDURE — 250N000024 HC ISOFLURANE, PER MIN: Performed by: THORACIC SURGERY (CARDIOTHORACIC VASCULAR SURGERY)

## 2021-10-16 PROCEDURE — 83605 ASSAY OF LACTIC ACID: CPT | Performed by: SURGERY

## 2021-10-16 PROCEDURE — 250N000013 HC RX MED GY IP 250 OP 250 PS 637: Performed by: STUDENT IN AN ORGANIZED HEALTH CARE EDUCATION/TRAINING PROGRAM

## 2021-10-16 PROCEDURE — 250N000011 HC RX IP 250 OP 636: Performed by: THORACIC SURGERY (CARDIOTHORACIC VASCULAR SURGERY)

## 2021-10-16 PROCEDURE — 71045 X-RAY EXAM CHEST 1 VIEW: CPT | Mod: 26 | Performed by: STUDENT IN AN ORGANIZED HEALTH CARE EDUCATION/TRAINING PROGRAM

## 2021-10-16 PROCEDURE — 5A1221Z PERFORMANCE OF CARDIAC OUTPUT, CONTINUOUS: ICD-10-PCS | Performed by: SURGERY

## 2021-10-16 PROCEDURE — 0BYM0Z0 TRANSPLANTATION OF BILATERAL LUNGS, ALLOGENEIC, OPEN APPROACH: ICD-10-PCS | Performed by: THORACIC SURGERY (CARDIOTHORACIC VASCULAR SURGERY)

## 2021-10-16 PROCEDURE — 250N000009 HC RX 250: Performed by: ANESTHESIOLOGY

## 2021-10-16 PROCEDURE — 272N000085 HC PACK CELL SAVER CSP: Performed by: THORACIC SURGERY (CARDIOTHORACIC VASCULAR SURGERY)

## 2021-10-16 PROCEDURE — 82803 BLOOD GASES ANY COMBINATION: CPT | Performed by: STUDENT IN AN ORGANIZED HEALTH CARE EDUCATION/TRAINING PROGRAM

## 2021-10-16 PROCEDURE — 999N000063 XR CHEST PORT 1 VIEW

## 2021-10-16 PROCEDURE — 0W9B30Z DRAINAGE OF LEFT PLEURAL CAVITY WITH DRAINAGE DEVICE, PERCUTANEOUS APPROACH: ICD-10-PCS | Performed by: THORACIC SURGERY (CARDIOTHORACIC VASCULAR SURGERY)

## 2021-10-16 PROCEDURE — 82330 ASSAY OF CALCIUM: CPT

## 2021-10-16 PROCEDURE — 85610 PROTHROMBIN TIME: CPT | Performed by: SURGERY

## 2021-10-16 PROCEDURE — 250N000013 HC RX MED GY IP 250 OP 250 PS 637: Performed by: ANESTHESIOLOGY

## 2021-10-16 PROCEDURE — 999N000065 XR ABDOMEN PORT 1 VIEWS

## 2021-10-16 PROCEDURE — 88309 TISSUE EXAM BY PATHOLOGIST: CPT | Mod: TC | Performed by: THORACIC SURGERY (CARDIOTHORACIC VASCULAR SURGERY)

## 2021-10-16 PROCEDURE — 410N000003 HC PER-PERFUSION 1ST 30 MIN: Performed by: THORACIC SURGERY (CARDIOTHORACIC VASCULAR SURGERY)

## 2021-10-16 PROCEDURE — 85730 THROMBOPLASTIN TIME PARTIAL: CPT | Performed by: STUDENT IN AN ORGANIZED HEALTH CARE EDUCATION/TRAINING PROGRAM

## 2021-10-16 PROCEDURE — 272N000001 HC OR GENERAL SUPPLY STERILE: Performed by: THORACIC SURGERY (CARDIOTHORACIC VASCULAR SURGERY)

## 2021-10-16 PROCEDURE — 258N000003 HC RX IP 258 OP 636: Performed by: INTERNAL MEDICINE

## 2021-10-16 PROCEDURE — 36592 COLLECT BLOOD FROM PICC: CPT | Performed by: INTERNAL MEDICINE

## 2021-10-16 PROCEDURE — 250N000011 HC RX IP 250 OP 636: Performed by: ANESTHESIOLOGY

## 2021-10-16 PROCEDURE — 0W3Q0ZZ CONTROL BLEEDING IN RESPIRATORY TRACT, OPEN APPROACH: ICD-10-PCS | Performed by: SURGERY

## 2021-10-16 PROCEDURE — 85018 HEMOGLOBIN: CPT | Performed by: STUDENT IN AN ORGANIZED HEALTH CARE EDUCATION/TRAINING PROGRAM

## 2021-10-16 PROCEDURE — 999N000157 HC STATISTIC RCP TIME EA 10 MIN

## 2021-10-16 PROCEDURE — 99292 CRITICAL CARE ADDL 30 MIN: CPT | Mod: 24 | Performed by: INTERNAL MEDICINE

## 2021-10-16 PROCEDURE — 82803 BLOOD GASES ANY COMBINATION: CPT

## 2021-10-16 PROCEDURE — 80053 COMPREHEN METABOLIC PANEL: CPT | Performed by: INTERNAL MEDICINE

## 2021-10-16 PROCEDURE — 82247 BILIRUBIN TOTAL: CPT | Performed by: STUDENT IN AN ORGANIZED HEALTH CARE EDUCATION/TRAINING PROGRAM

## 2021-10-16 PROCEDURE — 82330 ASSAY OF CALCIUM: CPT | Performed by: SURGERY

## 2021-10-16 PROCEDURE — 99291 CRITICAL CARE FIRST HOUR: CPT | Mod: 24 | Performed by: INTERNAL MEDICINE

## 2021-10-16 PROCEDURE — 85396 CLOTTING ASSAY WHOLE BLOOD: CPT | Performed by: INTERNAL MEDICINE

## 2021-10-16 PROCEDURE — 370N000017 HC ANESTHESIA TECHNICAL FEE, PER MIN: Performed by: THORACIC SURGERY (CARDIOTHORACIC VASCULAR SURGERY)

## 2021-10-16 PROCEDURE — 84295 ASSAY OF SERUM SODIUM: CPT

## 2021-10-16 PROCEDURE — C9113 INJ PANTOPRAZOLE SODIUM, VIA: HCPCS | Performed by: NURSE PRACTITIONER

## 2021-10-16 PROCEDURE — 84100 ASSAY OF PHOSPHORUS: CPT | Performed by: STUDENT IN AN ORGANIZED HEALTH CARE EDUCATION/TRAINING PROGRAM

## 2021-10-16 PROCEDURE — 85027 COMPLETE CBC AUTOMATED: CPT

## 2021-10-16 PROCEDURE — 83605 ASSAY OF LACTIC ACID: CPT | Performed by: ANESTHESIOLOGY

## 2021-10-16 PROCEDURE — 87116 MYCOBACTERIA CULTURE: CPT | Performed by: THORACIC SURGERY (CARDIOTHORACIC VASCULAR SURGERY)

## 2021-10-16 PROCEDURE — 250N000011 HC RX IP 250 OP 636: Performed by: NURSE PRACTITIONER

## 2021-10-16 PROCEDURE — 84295 ASSAY OF SERUM SODIUM: CPT | Performed by: STUDENT IN AN ORGANIZED HEALTH CARE EDUCATION/TRAINING PROGRAM

## 2021-10-16 PROCEDURE — 82805 BLOOD GASES W/O2 SATURATION: CPT

## 2021-10-16 PROCEDURE — 0BJ08ZZ INSPECTION OF TRACHEOBRONCHIAL TREE, VIA NATURAL OR ARTIFICIAL OPENING ENDOSCOPIC: ICD-10-PCS | Performed by: THORACIC SURGERY (CARDIOTHORACIC VASCULAR SURGERY)

## 2021-10-16 PROCEDURE — 272N000088 HC PUMP APP ADULT PERFUSION: Performed by: THORACIC SURGERY (CARDIOTHORACIC VASCULAR SURGERY)

## 2021-10-16 PROCEDURE — 94667 MNPJ CHEST WALL 1ST: CPT

## 2021-10-16 PROCEDURE — 250N000009 HC RX 250: Performed by: THORACIC SURGERY (CARDIOTHORACIC VASCULAR SURGERY)

## 2021-10-16 PROCEDURE — 83735 ASSAY OF MAGNESIUM: CPT | Performed by: SURGERY

## 2021-10-16 PROCEDURE — 87641 MR-STAPH DNA AMP PROBE: CPT | Performed by: STUDENT IN AN ORGANIZED HEALTH CARE EDUCATION/TRAINING PROGRAM

## 2021-10-16 PROCEDURE — 999N000141 HC STATISTIC PRE-PROCEDURE NURSING ASSESSMENT: Performed by: THORACIC SURGERY (CARDIOTHORACIC VASCULAR SURGERY)

## 2021-10-16 PROCEDURE — 250N000009 HC RX 250: Performed by: NURSE ANESTHETIST, CERTIFIED REGISTERED

## 2021-10-16 PROCEDURE — 250N000011 HC RX IP 250 OP 636: Performed by: NURSE ANESTHETIST, CERTIFIED REGISTERED

## 2021-10-16 PROCEDURE — 250N000011 HC RX IP 250 OP 636: Performed by: INTERNAL MEDICINE

## 2021-10-16 PROCEDURE — 410N000004: Performed by: THORACIC SURGERY (CARDIOTHORACIC VASCULAR SURGERY)

## 2021-10-16 PROCEDURE — 84100 ASSAY OF PHOSPHORUS: CPT | Performed by: SURGERY

## 2021-10-16 PROCEDURE — 85730 THROMBOPLASTIN TIME PARTIAL: CPT | Performed by: SURGERY

## 2021-10-16 PROCEDURE — 258N000003 HC RX IP 258 OP 636: Performed by: NURSE ANESTHETIST, CERTIFIED REGISTERED

## 2021-10-16 PROCEDURE — 82810 BLOOD GASES O2 SAT ONLY: CPT

## 2021-10-16 PROCEDURE — 83605 ASSAY OF LACTIC ACID: CPT | Performed by: THORACIC SURGERY (CARDIOTHORACIC VASCULAR SURGERY)

## 2021-10-16 PROCEDURE — 258N000003 HC RX IP 258 OP 636: Performed by: PHYSICIAN ASSISTANT

## 2021-10-16 PROCEDURE — 250N000011 HC RX IP 250 OP 636: Performed by: STUDENT IN AN ORGANIZED HEALTH CARE EDUCATION/TRAINING PROGRAM

## 2021-10-16 PROCEDURE — 84450 TRANSFERASE (AST) (SGOT): CPT | Performed by: SURGERY

## 2021-10-16 PROCEDURE — 200N000002 HC R&B ICU UMMC

## 2021-10-16 PROCEDURE — 82805 BLOOD GASES W/O2 SATURATION: CPT | Performed by: THORACIC SURGERY (CARDIOTHORACIC VASCULAR SURGERY)

## 2021-10-16 PROCEDURE — 85027 COMPLETE CBC AUTOMATED: CPT | Performed by: STUDENT IN AN ORGANIZED HEALTH CARE EDUCATION/TRAINING PROGRAM

## 2021-10-16 PROCEDURE — 93005 ELECTROCARDIOGRAM TRACING: CPT

## 2021-10-16 PROCEDURE — 71045 X-RAY EXAM CHEST 1 VIEW: CPT | Mod: 26 | Performed by: RADIOLOGY

## 2021-10-16 PROCEDURE — 83735 ASSAY OF MAGNESIUM: CPT | Performed by: STUDENT IN AN ORGANIZED HEALTH CARE EDUCATION/TRAINING PROGRAM

## 2021-10-16 RX ORDER — SODIUM CHLORIDE, SODIUM LACTATE, POTASSIUM CHLORIDE, CALCIUM CHLORIDE 600; 310; 30; 20 MG/100ML; MG/100ML; MG/100ML; MG/100ML
INJECTION, SOLUTION INTRAVENOUS CONTINUOUS
Status: DISCONTINUED | OUTPATIENT
Start: 2021-10-16 | End: 2021-10-18

## 2021-10-16 RX ORDER — PROPOFOL 10 MG/ML
INJECTION, EMULSION INTRAVENOUS PRN
Status: DISCONTINUED | OUTPATIENT
Start: 2021-10-16 | End: 2021-10-16

## 2021-10-16 RX ORDER — MYCOPHENOLATE MOFETIL 200 MG/ML
1500 POWDER, FOR SUSPENSION ORAL 2 TIMES DAILY
Status: DISCONTINUED | OUTPATIENT
Start: 2021-10-16 | End: 2021-10-29

## 2021-10-16 RX ORDER — LIDOCAINE HYDROCHLORIDE 10 MG/ML
INJECTION, SOLUTION INFILTRATION; PERINEURAL
Status: COMPLETED | OUTPATIENT
Start: 2021-10-16 | End: 2021-10-16

## 2021-10-16 RX ORDER — OXYCODONE HYDROCHLORIDE 5 MG/1
5 TABLET ORAL EVERY 4 HOURS PRN
Status: DISCONTINUED | OUTPATIENT
Start: 2021-10-16 | End: 2021-10-28

## 2021-10-16 RX ORDER — DEXTROSE MONOHYDRATE 25 G/50ML
25-50 INJECTION, SOLUTION INTRAVENOUS
Status: DISCONTINUED | OUTPATIENT
Start: 2021-10-16 | End: 2021-10-25

## 2021-10-16 RX ORDER — PROCHLORPERAZINE MALEATE 5 MG
10 TABLET ORAL EVERY 6 HOURS PRN
Status: DISCONTINUED | OUTPATIENT
Start: 2021-10-16 | End: 2021-12-13 | Stop reason: HOSPADM

## 2021-10-16 RX ORDER — ACETAMINOPHEN 325 MG/1
650 TABLET ORAL EVERY 4 HOURS PRN
Status: DISCONTINUED | OUTPATIENT
Start: 2021-10-19 | End: 2021-10-16

## 2021-10-16 RX ORDER — POLYETHYLENE GLYCOL 3350 17 G/17G
17 POWDER, FOR SOLUTION ORAL DAILY
Status: DISCONTINUED | OUTPATIENT
Start: 2021-10-17 | End: 2021-10-19

## 2021-10-16 RX ORDER — VANCOMYCIN HYDROCHLORIDE 1 G/200ML
1000 INJECTION, SOLUTION INTRAVENOUS EVERY 12 HOURS
Status: DISCONTINUED | OUTPATIENT
Start: 2021-10-16 | End: 2021-10-17

## 2021-10-16 RX ORDER — CALCIUM GLUCONATE 20 MG/ML
2 INJECTION, SOLUTION INTRAVENOUS ONCE
Status: COMPLETED | OUTPATIENT
Start: 2021-10-16 | End: 2021-10-17

## 2021-10-16 RX ORDER — ACYCLOVIR 200 MG/5ML
400 SUSPENSION ORAL 2 TIMES DAILY
Status: DISCONTINUED | OUTPATIENT
Start: 2021-10-24 | End: 2021-10-16

## 2021-10-16 RX ORDER — ONDANSETRON 2 MG/ML
4 INJECTION INTRAMUSCULAR; INTRAVENOUS EVERY 6 HOURS PRN
Status: DISCONTINUED | OUTPATIENT
Start: 2021-10-16 | End: 2021-12-13 | Stop reason: HOSPADM

## 2021-10-16 RX ORDER — ACETYLCYSTEINE 200 MG/ML
2 SOLUTION ORAL; RESPIRATORY (INHALATION) 4 TIMES DAILY
Status: DISCONTINUED | OUTPATIENT
Start: 2021-10-16 | End: 2021-11-23

## 2021-10-16 RX ORDER — NYSTATIN 100000/ML
1000000 SUSPENSION, ORAL (FINAL DOSE FORM) ORAL 4 TIMES DAILY
Status: DISCONTINUED | OUTPATIENT
Start: 2021-10-16 | End: 2021-12-13 | Stop reason: HOSPADM

## 2021-10-16 RX ORDER — MYCOPHENOLATE MOFETIL 250 MG/1
1500 CAPSULE ORAL 2 TIMES DAILY
Status: DISCONTINUED | OUTPATIENT
Start: 2021-10-16 | End: 2021-10-17

## 2021-10-16 RX ORDER — CEFTAZIDIME 2 G/1
2 INJECTION, POWDER, FOR SOLUTION INTRAVENOUS EVERY 8 HOURS
Status: DISCONTINUED | OUTPATIENT
Start: 2021-10-16 | End: 2021-10-17

## 2021-10-16 RX ORDER — TACROLIMUS 0.5 MG/1
1 CAPSULE ORAL
Status: DISCONTINUED | OUTPATIENT
Start: 2021-10-17 | End: 2021-10-18

## 2021-10-16 RX ORDER — SODIUM CHLORIDE, SODIUM LACTATE, POTASSIUM CHLORIDE, CALCIUM CHLORIDE 600; 310; 30; 20 MG/100ML; MG/100ML; MG/100ML; MG/100ML
INJECTION, SOLUTION INTRAVENOUS CONTINUOUS PRN
Status: DISCONTINUED | OUTPATIENT
Start: 2021-10-16 | End: 2021-10-16

## 2021-10-16 RX ORDER — HYDROMORPHONE HCL IN WATER/PF 6 MG/30 ML
0.4 PATIENT CONTROLLED ANALGESIA SYRINGE INTRAVENOUS
Status: DISCONTINUED | OUTPATIENT
Start: 2021-10-16 | End: 2021-10-27

## 2021-10-16 RX ORDER — ACYCLOVIR 200 MG/1
400 CAPSULE ORAL 2 TIMES DAILY
Status: DISCONTINUED | OUTPATIENT
Start: 2021-10-24 | End: 2021-10-16

## 2021-10-16 RX ORDER — LIDOCAINE 40 MG/G
CREAM TOPICAL
Status: DISCONTINUED | OUTPATIENT
Start: 2021-10-16 | End: 2021-11-10

## 2021-10-16 RX ORDER — ACETAMINOPHEN 325 MG/1
975 TABLET ORAL EVERY 8 HOURS
Status: COMPLETED | OUTPATIENT
Start: 2021-10-16 | End: 2021-10-19

## 2021-10-16 RX ORDER — PROPOFOL 10 MG/ML
5-75 INJECTION, EMULSION INTRAVENOUS CONTINUOUS
Status: DISCONTINUED | OUTPATIENT
Start: 2021-10-16 | End: 2021-10-18

## 2021-10-16 RX ORDER — SULFAMETHOXAZOLE AND TRIMETHOPRIM 400; 80 MG/1; MG/1
1 TABLET ORAL DAILY
Status: DISCONTINUED | OUTPATIENT
Start: 2021-10-16 | End: 2021-10-29

## 2021-10-16 RX ORDER — PREDNISOLONE SODIUM PHOSPHATE 15 MG/5ML
17.5 SOLUTION ORAL 2 TIMES DAILY
Status: COMPLETED | OUTPATIENT
Start: 2021-10-17 | End: 2021-10-23

## 2021-10-16 RX ORDER — PREDNISOLONE SODIUM PHOSPHATE 15 MG/5ML
0.25 SOLUTION ORAL 2 TIMES DAILY
Status: DISCONTINUED | OUTPATIENT
Start: 2021-10-17 | End: 2021-10-16

## 2021-10-16 RX ORDER — FENTANYL CITRATE 50 UG/ML
INJECTION, SOLUTION INTRAMUSCULAR; INTRAVENOUS PRN
Status: DISCONTINUED | OUTPATIENT
Start: 2021-10-16 | End: 2021-10-16

## 2021-10-16 RX ORDER — ACETAMINOPHEN 325 MG/1
975 TABLET ORAL EVERY 8 HOURS
Status: DISCONTINUED | OUTPATIENT
Start: 2021-10-16 | End: 2021-10-16

## 2021-10-16 RX ORDER — NICOTINE POLACRILEX 4 MG
15-30 LOZENGE BUCCAL
Status: DISCONTINUED | OUTPATIENT
Start: 2021-10-16 | End: 2021-10-25

## 2021-10-16 RX ORDER — NALOXONE HYDROCHLORIDE 0.4 MG/ML
0.2 INJECTION, SOLUTION INTRAMUSCULAR; INTRAVENOUS; SUBCUTANEOUS
Status: DISCONTINUED | OUTPATIENT
Start: 2021-10-16 | End: 2021-12-13 | Stop reason: HOSPADM

## 2021-10-16 RX ORDER — LEVALBUTEROL INHALATION SOLUTION 1.25 MG/3ML
1.25 SOLUTION RESPIRATORY (INHALATION) 4 TIMES DAILY
Status: DISCONTINUED | OUTPATIENT
Start: 2021-10-16 | End: 2021-11-11

## 2021-10-16 RX ORDER — HEPARIN SODIUM 1000 [USP'U]/ML
INJECTION, SOLUTION INTRAVENOUS; SUBCUTANEOUS PRN
Status: DISCONTINUED | OUTPATIENT
Start: 2021-10-16 | End: 2021-10-16

## 2021-10-16 RX ORDER — NALOXONE HYDROCHLORIDE 0.4 MG/ML
0.4 INJECTION, SOLUTION INTRAMUSCULAR; INTRAVENOUS; SUBCUTANEOUS
Status: DISCONTINUED | OUTPATIENT
Start: 2021-10-16 | End: 2021-12-13 | Stop reason: HOSPADM

## 2021-10-16 RX ORDER — OXYCODONE HYDROCHLORIDE 10 MG/1
10 TABLET ORAL EVERY 4 HOURS PRN
Status: DISCONTINUED | OUTPATIENT
Start: 2021-10-16 | End: 2021-10-28

## 2021-10-16 RX ORDER — DEXTROSE MONOHYDRATE 100 MG/ML
INJECTION, SOLUTION INTRAVENOUS CONTINUOUS PRN
Status: DISCONTINUED | OUTPATIENT
Start: 2021-10-16 | End: 2021-10-26

## 2021-10-16 RX ORDER — BISACODYL 10 MG
10 SUPPOSITORY, RECTAL RECTAL DAILY PRN
Status: DISCONTINUED | OUTPATIENT
Start: 2021-10-16 | End: 2021-12-13 | Stop reason: HOSPADM

## 2021-10-16 RX ORDER — PROPOFOL 10 MG/ML
INJECTION, EMULSION INTRAVENOUS CONTINUOUS PRN
Status: DISCONTINUED | OUTPATIENT
Start: 2021-10-16 | End: 2021-10-16

## 2021-10-16 RX ORDER — METHYLPREDNISOLONE SODIUM SUCCINATE 125 MG/2ML
125 INJECTION, POWDER, LYOPHILIZED, FOR SOLUTION INTRAMUSCULAR; INTRAVENOUS EVERY 8 HOURS
Status: DISCONTINUED | OUTPATIENT
Start: 2021-10-16 | End: 2021-10-16

## 2021-10-16 RX ORDER — ACYCLOVIR 200 MG/1
400 CAPSULE ORAL 2 TIMES DAILY
Status: DISCONTINUED | OUTPATIENT
Start: 2021-10-16 | End: 2021-10-17

## 2021-10-16 RX ORDER — METHOCARBAMOL 750 MG/1
750 TABLET, FILM COATED ORAL EVERY 6 HOURS PRN
Status: DISCONTINUED | OUTPATIENT
Start: 2021-10-16 | End: 2021-10-27

## 2021-10-16 RX ORDER — SODIUM CHLORIDE, SODIUM GLUCONATE, SODIUM ACETATE, POTASSIUM CHLORIDE AND MAGNESIUM CHLORIDE 526; 502; 368; 37; 30 MG/100ML; MG/100ML; MG/100ML; MG/100ML; MG/100ML
INJECTION, SOLUTION INTRAVENOUS CONTINUOUS PRN
Status: DISCONTINUED | OUTPATIENT
Start: 2021-10-16 | End: 2021-10-16

## 2021-10-16 RX ORDER — ONDANSETRON 4 MG/1
4 TABLET, ORALLY DISINTEGRATING ORAL EVERY 6 HOURS PRN
Status: DISCONTINUED | OUTPATIENT
Start: 2021-10-16 | End: 2021-12-13 | Stop reason: HOSPADM

## 2021-10-16 RX ORDER — GABAPENTIN 250 MG/5ML
100 SOLUTION ORAL 3 TIMES DAILY
Status: DISCONTINUED | OUTPATIENT
Start: 2021-10-16 | End: 2021-10-23

## 2021-10-16 RX ORDER — PROTAMINE SULFATE 10 MG/ML
INJECTION, SOLUTION INTRAVENOUS PRN
Status: DISCONTINUED | OUTPATIENT
Start: 2021-10-16 | End: 2021-10-16

## 2021-10-16 RX ORDER — AMOXICILLIN 250 MG
1 CAPSULE ORAL 2 TIMES DAILY
Status: DISCONTINUED | OUTPATIENT
Start: 2021-10-16 | End: 2021-11-06

## 2021-10-16 RX ORDER — DIPHENHYDRAMINE HCL 25 MG
25 CAPSULE ORAL EVERY 6 HOURS PRN
Status: DISCONTINUED | OUTPATIENT
Start: 2021-10-16 | End: 2021-11-10

## 2021-10-16 RX ORDER — MYCOPHENOLATE MOFETIL 200 MG/ML
1500 POWDER, FOR SUSPENSION ORAL
Status: DISCONTINUED | OUTPATIENT
Start: 2021-10-16 | End: 2021-10-16

## 2021-10-16 RX ORDER — ACYCLOVIR 200 MG/5ML
400 SUSPENSION ORAL 2 TIMES DAILY
Status: DISCONTINUED | OUTPATIENT
Start: 2021-10-16 | End: 2021-11-02

## 2021-10-16 RX ORDER — LIDOCAINE HYDROCHLORIDE 20 MG/ML
INJECTION, SOLUTION INFILTRATION; PERINEURAL PRN
Status: DISCONTINUED | OUTPATIENT
Start: 2021-10-16 | End: 2021-10-16

## 2021-10-16 RX ORDER — MYCOPHENOLATE MOFETIL 250 MG/1
1500 CAPSULE ORAL
Status: DISCONTINUED | OUTPATIENT
Start: 2021-10-16 | End: 2021-10-16

## 2021-10-16 RX ORDER — METHYLPREDNISOLONE SODIUM SUCCINATE 125 MG/2ML
125 INJECTION, POWDER, LYOPHILIZED, FOR SOLUTION INTRAMUSCULAR; INTRAVENOUS EVERY 8 HOURS
Status: COMPLETED | OUTPATIENT
Start: 2021-10-16 | End: 2021-10-17

## 2021-10-16 RX ORDER — SULFAMETHOXAZOLE AND TRIMETHOPRIM 200; 40 MG/5ML; MG/5ML
10 SUSPENSION ORAL DAILY
Status: DISCONTINUED | OUTPATIENT
Start: 2021-10-16 | End: 2021-10-29

## 2021-10-16 RX ORDER — DIPHENHYDRAMINE HYDROCHLORIDE 50 MG/ML
25 INJECTION INTRAMUSCULAR; INTRAVENOUS EVERY 6 HOURS PRN
Status: DISCONTINUED | OUTPATIENT
Start: 2021-10-16 | End: 2021-11-10

## 2021-10-16 RX ORDER — HYDROMORPHONE HCL IN WATER/PF 6 MG/30 ML
0.2 PATIENT CONTROLLED ANALGESIA SYRINGE INTRAVENOUS
Status: DISCONTINUED | OUTPATIENT
Start: 2021-10-16 | End: 2021-10-27

## 2021-10-16 RX ORDER — METHOCARBAMOL 500 MG/1
500 TABLET, FILM COATED ORAL 4 TIMES DAILY
Status: DISCONTINUED | OUTPATIENT
Start: 2021-10-16 | End: 2021-10-17

## 2021-10-16 RX ADMIN — VASOPRESSIN 4 UNITS/HR: 20 INJECTION INTRAVENOUS at 17:23

## 2021-10-16 RX ADMIN — METHOCARBAMOL 500 MG: 500 TABLET ORAL at 17:00

## 2021-10-16 RX ADMIN — FENTANYL CITRATE 100 MCG: 50 INJECTION, SOLUTION INTRAMUSCULAR; INTRAVENOUS at 08:55

## 2021-10-16 RX ADMIN — FENTANYL CITRATE 500 MCG: 50 INJECTION, SOLUTION INTRAMUSCULAR; INTRAVENOUS at 08:18

## 2021-10-16 RX ADMIN — ACETAMINOPHEN 975 MG: 325 TABLET, FILM COATED ORAL at 17:00

## 2021-10-16 RX ADMIN — PROPOFOL 30 MG: 10 INJECTION, EMULSION INTRAVENOUS at 07:21

## 2021-10-16 RX ADMIN — PROTAMINE SULFATE 10 MG: 10 INJECTION, SOLUTION INTRAVENOUS at 12:17

## 2021-10-16 RX ADMIN — ROCURONIUM BROMIDE 100 MG: 50 INJECTION, SOLUTION INTRAVENOUS at 12:54

## 2021-10-16 RX ADMIN — NOREPINEPHRINE BITARTRATE 0.05 MCG/KG/MIN: 1 INJECTION, SOLUTION, CONCENTRATE INTRAVENOUS at 10:15

## 2021-10-16 RX ADMIN — SODIUM CHLORIDE, POTASSIUM CHLORIDE, SODIUM LACTATE AND CALCIUM CHLORIDE 500 ML: 600; 310; 30; 20 INJECTION, SOLUTION INTRAVENOUS at 21:38

## 2021-10-16 RX ADMIN — SODIUM CHLORIDE, POTASSIUM CHLORIDE, SODIUM LACTATE AND CALCIUM CHLORIDE: 600; 310; 30; 20 INJECTION, SOLUTION INTRAVENOUS at 12:58

## 2021-10-16 RX ADMIN — CEFTAZIDIME 2 G: 2 INJECTION, POWDER, FOR SOLUTION INTRAVENOUS at 19:33

## 2021-10-16 RX ADMIN — NOREPINEPHRINE BITARTRATE 6.4 MCG: 1 INJECTION, SOLUTION, CONCENTRATE INTRAVENOUS at 13:26

## 2021-10-16 RX ADMIN — VANCOMYCIN HYDROCHLORIDE 1000 MG: 1 INJECTION, SOLUTION INTRAVENOUS at 06:04

## 2021-10-16 RX ADMIN — PROPOFOL 30 MCG/KG/MIN: 10 INJECTION, EMULSION INTRAVENOUS at 14:37

## 2021-10-16 RX ADMIN — DOCUSATE SODIUM 50 MG AND SENNOSIDES 8.6 MG 1 TABLET: 8.6; 5 TABLET, FILM COATED ORAL at 20:22

## 2021-10-16 RX ADMIN — SODIUM CHLORIDE, SODIUM GLUCONATE, SODIUM ACETATE, POTASSIUM CHLORIDE AND MAGNESIUM CHLORIDE: 526; 502; 368; 37; 30 INJECTION, SOLUTION INTRAVENOUS at 13:21

## 2021-10-16 RX ADMIN — PROPOFOL 20 MCG/KG/MIN: 10 INJECTION, EMULSION INTRAVENOUS at 16:49

## 2021-10-16 RX ADMIN — ROCURONIUM BROMIDE 50 MG: 50 INJECTION, SOLUTION INTRAVENOUS at 14:36

## 2021-10-16 RX ADMIN — Medication 25 MCG/HR: at 16:40

## 2021-10-16 RX ADMIN — LIDOCAINE HYDROCHLORIDE 100 MG: 20 INJECTION, SOLUTION INFILTRATION; PERINEURAL at 07:21

## 2021-10-16 RX ADMIN — PANTOPRAZOLE SODIUM 40 MG: 40 INJECTION, POWDER, FOR SOLUTION INTRAVENOUS at 19:40

## 2021-10-16 RX ADMIN — ROCURONIUM BROMIDE 50 MG: 50 INJECTION, SOLUTION INTRAVENOUS at 11:23

## 2021-10-16 RX ADMIN — NOREPINEPHRINE BITARTRATE 0.1 MCG/KG/MIN: 1 INJECTION, SOLUTION, CONCENTRATE INTRAVENOUS at 17:24

## 2021-10-16 RX ADMIN — CEFTAZIDIME 1 G: 1 INJECTION, POWDER, FOR SOLUTION INTRAMUSCULAR; INTRAVENOUS at 11:54

## 2021-10-16 RX ADMIN — ROCURONIUM BROMIDE 50 MG: 50 INJECTION, SOLUTION INTRAVENOUS at 09:44

## 2021-10-16 RX ADMIN — SODIUM BICARBONATE 50 MEQ: 84 INJECTION INTRAVENOUS at 18:08

## 2021-10-16 RX ADMIN — SODIUM CHLORIDE, POTASSIUM CHLORIDE, SODIUM LACTATE AND CALCIUM CHLORIDE 500 ML: 600; 310; 30; 20 INJECTION, SOLUTION INTRAVENOUS at 18:03

## 2021-10-16 RX ADMIN — VANCOMYCIN HYDROCHLORIDE 1000 MG: 1 INJECTION, SOLUTION INTRAVENOUS at 18:14

## 2021-10-16 RX ADMIN — NOREPINEPHRINE BITARTRATE 6.4 MCG: 1 INJECTION, SOLUTION, CONCENTRATE INTRAVENOUS at 14:19

## 2021-10-16 RX ADMIN — EPINEPHRINE 0.05 MCG/KG/MIN: 1 INJECTION PARENTERAL at 20:46

## 2021-10-16 RX ADMIN — HUMAN INSULIN 5 UNITS/HR: 100 INJECTION, SOLUTION SUBCUTANEOUS at 10:54

## 2021-10-16 RX ADMIN — PROPOFOL 20 MCG/KG/MIN: 10 INJECTION, EMULSION INTRAVENOUS at 20:44

## 2021-10-16 RX ADMIN — SODIUM CHLORIDE, POTASSIUM CHLORIDE, SODIUM LACTATE AND CALCIUM CHLORIDE 500 ML: 600; 310; 30; 20 INJECTION, SOLUTION INTRAVENOUS at 16:52

## 2021-10-16 RX ADMIN — VASOPRESSIN 2 UNITS/HR: 20 INJECTION INTRAVENOUS at 12:36

## 2021-10-16 RX ADMIN — SODIUM CHLORIDE, SODIUM GLUCONATE, SODIUM ACETATE, POTASSIUM CHLORIDE AND MAGNESIUM CHLORIDE: 526; 502; 368; 37; 30 INJECTION, SOLUTION INTRAVENOUS at 07:04

## 2021-10-16 RX ADMIN — PROPOFOL 30 MG: 10 INJECTION, EMULSION INTRAVENOUS at 08:35

## 2021-10-16 RX ADMIN — NOREPINEPHRINE BITARTRATE 6.4 MCG: 1 INJECTION, SOLUTION, CONCENTRATE INTRAVENOUS at 12:41

## 2021-10-16 RX ADMIN — SODIUM CHLORIDE, PRESERVATIVE FREE 5 ML: 5 INJECTION INTRAVENOUS at 05:25

## 2021-10-16 RX ADMIN — LIDOCAINE HYDROCHLORIDE 1 ML: 10 INJECTION, SOLUTION INFILTRATION; PERINEURAL at 08:31

## 2021-10-16 RX ADMIN — NOREPINEPHRINE BITARTRATE 6.4 MCG: 1 INJECTION, SOLUTION, CONCENTRATE INTRAVENOUS at 14:16

## 2021-10-16 RX ADMIN — NYSTATIN 1000000 UNITS: 500000 SUSPENSION ORAL at 17:00

## 2021-10-16 RX ADMIN — EPINEPHRINE 0.04 MCG/KG/MIN: 1 INJECTION PARENTERAL at 18:04

## 2021-10-16 RX ADMIN — ACETYLCYSTEINE 2 ML: 200 SOLUTION ORAL; RESPIRATORY (INHALATION) at 20:33

## 2021-10-16 RX ADMIN — PROTAMINE SULFATE 140 MG: 10 INJECTION, SOLUTION INTRAVENOUS at 12:19

## 2021-10-16 RX ADMIN — FENTANYL CITRATE 250 MCG: 50 INJECTION, SOLUTION INTRAMUSCULAR; INTRAVENOUS at 07:44

## 2021-10-16 RX ADMIN — SODIUM BICARBONATE 50 MEQ: 84 INJECTION INTRAVENOUS at 21:40

## 2021-10-16 RX ADMIN — NOREPINEPHRINE BITARTRATE 6.4 MCG: 1 INJECTION, SOLUTION, CONCENTRATE INTRAVENOUS at 14:13

## 2021-10-16 RX ADMIN — CEFTAZIDIME 1 G: 1 INJECTION, POWDER, FOR SOLUTION INTRAMUSCULAR; INTRAVENOUS at 07:53

## 2021-10-16 RX ADMIN — GABAPENTIN 100 MG: 250 SUSPENSION ORAL at 20:21

## 2021-10-16 RX ADMIN — SODIUM CHLORIDE 1000 MG: 9 INJECTION, SOLUTION INTRAVENOUS at 08:05

## 2021-10-16 RX ADMIN — METHOCARBAMOL 500 MG: 500 TABLET ORAL at 20:22

## 2021-10-16 RX ADMIN — PROPOFOL 30 MG: 10 INJECTION, EMULSION INTRAVENOUS at 08:39

## 2021-10-16 RX ADMIN — SODIUM BICARBONATE 100 MEQ: 84 INJECTION INTRAVENOUS at 23:54

## 2021-10-16 RX ADMIN — ROCURONIUM BROMIDE 100 MG: 50 INJECTION, SOLUTION INTRAVENOUS at 07:21

## 2021-10-16 RX ADMIN — MIDAZOLAM 5 MG: 1 INJECTION INTRAMUSCULAR; INTRAVENOUS at 07:19

## 2021-10-16 RX ADMIN — NOREPINEPHRINE BITARTRATE 6.4 MCG: 1 INJECTION, SOLUTION, CONCENTRATE INTRAVENOUS at 12:50

## 2021-10-16 RX ADMIN — SODIUM CHLORIDE 20 MG: 9 INJECTION, SOLUTION INTRAVENOUS at 07:52

## 2021-10-16 RX ADMIN — SODIUM CHLORIDE 7.5 G: 900 INJECTION, SOLUTION INTRAVENOUS at 08:30

## 2021-10-16 RX ADMIN — NOREPINEPHRINE BITARTRATE 6.4 MCG: 1 INJECTION, SOLUTION, CONCENTRATE INTRAVENOUS at 13:17

## 2021-10-16 RX ADMIN — HEPARIN SODIUM 27000 UNITS: 1000 INJECTION INTRAVENOUS; SUBCUTANEOUS at 09:42

## 2021-10-16 RX ADMIN — HUMAN INSULIN 8 UNITS/HR: 100 INJECTION, SOLUTION SUBCUTANEOUS at 23:23

## 2021-10-16 RX ADMIN — GABAPENTIN 100 MG: 250 SUSPENSION ORAL at 17:49

## 2021-10-16 RX ADMIN — MYCOPHENOLATE MOFETIL 1500 MG: 200 POWDER, FOR SUSPENSION ORAL at 20:21

## 2021-10-16 RX ADMIN — ACYCLOVIR 400 MG: 200 SUSPENSION ORAL at 20:21

## 2021-10-16 RX ADMIN — SODIUM CHLORIDE 1.25 G/HR: 900 INJECTION, SOLUTION INTRAVENOUS at 09:30

## 2021-10-16 RX ADMIN — HUMAN INSULIN 5 UNITS/HR: 100 INJECTION, SOLUTION SUBCUTANEOUS at 16:28

## 2021-10-16 RX ADMIN — METHYLPREDNISOLONE SODIUM SUCCINATE 125 MG: 125 INJECTION, POWDER, FOR SOLUTION INTRAMUSCULAR; INTRAVENOUS at 19:36

## 2021-10-16 RX ADMIN — SODIUM CHLORIDE, POTASSIUM CHLORIDE, SODIUM LACTATE AND CALCIUM CHLORIDE 500 ML: 600; 310; 30; 20 INJECTION, SOLUTION INTRAVENOUS at 23:54

## 2021-10-16 RX ADMIN — PROPOFOL 30 MG: 10 INJECTION, EMULSION INTRAVENOUS at 08:33

## 2021-10-16 RX ADMIN — FENTANYL CITRATE 200 MCG: 50 INJECTION, SOLUTION INTRAMUSCULAR; INTRAVENOUS at 12:17

## 2021-10-16 RX ADMIN — FENTANYL CITRATE 250 MCG: 50 INJECTION, SOLUTION INTRAMUSCULAR; INTRAVENOUS at 07:20

## 2021-10-16 RX ADMIN — SODIUM CHLORIDE, POTASSIUM CHLORIDE, SODIUM LACTATE AND CALCIUM CHLORIDE 500 ML: 600; 310; 30; 20 INJECTION, SOLUTION INTRAVENOUS at 20:37

## 2021-10-16 RX ADMIN — NYSTATIN 1000000 UNITS: 500000 SUSPENSION ORAL at 20:22

## 2021-10-16 RX ADMIN — SODIUM BICARBONATE 100 MEQ: 84 INJECTION INTRAVENOUS at 20:38

## 2021-10-16 RX ADMIN — PHENYLEPHRINE HYDROCHLORIDE 100 MCG: 10 INJECTION INTRAVENOUS at 09:51

## 2021-10-16 RX ADMIN — FENTANYL CITRATE 200 MCG: 50 INJECTION, SOLUTION INTRAMUSCULAR; INTRAVENOUS at 12:14

## 2021-10-16 ASSESSMENT — ACTIVITIES OF DAILY LIVING (ADL)
ADLS_ACUITY_SCORE: 8
ADLS_ACUITY_SCORE: 10

## 2021-10-16 ASSESSMENT — MIFFLIN-ST. JEOR: SCORE: 1430.47

## 2021-10-16 NOTE — H&P
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 Corral.      PLAN:  Neuro/ pain/ sedation:  Acute Postoperative pain  Anxiety  Depression  - Monitor neurological status. Notify the MD for any acute changes in exam.  - Pain: Fentanyl gtt. Tylenol, Robaxin, Gabapentin ramses. PRN oxycodone, IV dilaudid,  - Sedation: Propofol gtt  - BIS monitoring as the patient is not reversed  - Goal RASS: 0 to -2  - Continue PTA Escitalopram    Pulmonary care:   Postoperative ventilation management  S/p B/l lung transplant  SALTY on home CPAP  - Ventilator Settings:CMV- 20/400/5/60  - ABG- 7.23/258/54/22  - Caroline- 20  - Albuterol nebs and Mucomyst  - Chest Physiotherapy  - Daily CXR  - Titrate supplemental oxygen to maintain saturation above 92%.  - Pulmonary hygiene: Incentive spirometer every 15- 30 minutes when awake, flutter valve, C&DB      Cardiovascular:    S/p b/l lung transplant on 10/16/21 by Dr. Corral  HTN  Recent echo on 09/07/21 with LVEF of 60-65%. Pulmonary HTN.    - Cardiac Cath(9/8/21)-     Right sided filling pressures are mildly elevated.    Moderately elevated pulmonary artery hypertension.    Left sided filling pressures are normal.    Normal cardiac output level.    Normal coronary arteries with mild tortuosity    - Monitor hemodynamic status.   - Lactic Acid: 4.2  - Goal MAP >65  - Pressors: Noreip, Vasopressin gtt. Wean as needed  - Statin: none  - ASA: one  - PTA meds: Amlodipine withheld        GI care/ Nutrition:   - Diet:   Orders Placed This Encounter      NPO per Anesthesia Guidelines for Procedure/Surgery Except for: Meds     - PPI  - Continue bowel regimen: miralax, senna    Renal/ Fluid Balance/ Electrolytes:   BL  creat appears to be ~ 0.7   - Strict I/O, daily weights  - Avoid/limit nephrotoxins as able  - Replete lytes PRN per protocol  -   Endocrine:    Stress induced hyperglycemia  Hypothyroidism  DM  RA  Preop A1c -6.6  - Insulin gtt  - Goal BG <180 for optimal healing  - PTA meds: Solumedrol 125 mg q6, Synthroid 25 mcg  - Received 2 doses of Rituximab in 6/21  - Rheumatology consult, will reach out to them on Monday    ID/ Antibiotics:  Immunosuppression  Stress induced leukocytosis  - pre-op WBC -22.9  - To complete perioperative regimen- Ceftazidime, Vancomycin  - Nystatin, Acyclovir  - Tacrolimus  - Basiliximab on POD#4  - Methylpred followed by oral prednisone  - Continue to monitor fever curve, WBC and inflammatory markers as appropriate    - (PTA Bactrim, Cefepime, Doxycycline)  -Azathioprine to be avoided after transplant given low TPMT based on recommendations of transplant pulmonology, however if used, then azathioprine to be used in a low-dose    Heme:     Acute blood loss anemia  Acute blood loss thrombocytopenia  EBL- 1000 cc  Intra-op blood products- 360 ml cell saver  Pre-op Hb- 11.1, Platelet- 224  No s/sx active bleeding  - CBC in AM  - ID as above    MSK/ Skin:  Sternotomy  Surgical Incision  - Sternal precautions  - Postoperative incision management per protocol  - PT/OT/CR    Prophylaxis:    - Mechanical prophylaxis for DVT: SCDs  - Chemical DVT prophylaxis: Heparin subcutaneous tomorrow  - PPI for 30 days postoperatively    Lines/ tubes/ drains:  - PICC(pre-op)  - Right radial A line(10/16)  - Right CVC(10/16)  - Right [place](10/16)  - ETT(10/16)  - [place](10/16)  - Chect tubes- 4 pleural, 1 mediastinal(10/16)    Disposition:  - CV ICU.     Patient seen, findings and plan discussed with CV ICU staff.    Eunice Gutiérrez MD  PGY-3  Anesthesia    ====================================    HPI:   Edson Thornton is a 56 year old male with PMH ILD and rheumatoid lung disease, RA, SALTY, hypothyroid, HTN,anxiety and  depression, HLD, duodenal anomaly admitted on 9/5/2021 in transfer for lung transplant workup and risk of need for ECMO. Pt comes from [place] and was transferred after being in their local hospital x 4 days on HiFlow NC and BiPAP.  His course started in ~May 2020 when he noted joint pains initially in the hands and then his knees and has a father with RA. He initially was not Dx with RA but then went to Rheumatology who diagnosed him.  He notes the breathing problems started in June or July of this year and initially had some improvement with Breo inhaler while he was in [place], but noted after some time it stopped being effective.  Over the past few months his O2 is 4L at BL he and uses a CPAP at night.    PAST MEDICAL HISTORY:   Past Medical History:   Diagnosis Date     Anxiety      Depression      HLD (hyperlipidemia)      HTN (hypertension)      Hypothyroidism      ILD (interstitial lung disease) (H)      SALTY on CPAP      Oxygen dependent     BL 4L since ~6/2021     Rheumatoid arthritis (H)     signs ~5/2020, dx 5/2021     Steroid-induced hyperglycemia      Traction bronchiectasis (H)        PAST SURGICAL HISTORY:   Past Surgical History:   Procedure Laterality Date     COLONOSCOPY W/ BIOPSIES AND POLYPECTOMY  07/21/2020     CV CORONARY ANGIOGRAM N/A 9/8/2021    Procedure: Coronary Angiogram with possible intervention;  Surgeon: Jovon Bullock MD;  Location:  HEART CARDIAC CATH LAB     CV RIGHT HEART CATH MEASUREMENTS RECORDED N/A 9/8/2021    Procedure: Right Heart Cath;  Surgeon: Jovon Bullock MD;  Location:  HEART CARDIAC CATH LAB     EXTRACTION(S) DENTAL N/A 9/22/2021    Procedure: EXTRACTION tooth #19;  Surgeon: Deepak Tobin DDS;  Location:  OR     HERNIA REPAIR       right acl       XR ACROMIOCLAVICULAR JOINT BILATERAL         FAMILY HISTORY:   Family History   Problem Relation Age of Onset     Diabetes Type 1 Mother      Heart Disease Mother      Chronic  Obstructive Pulmonary Disease Mother      Rheumatoid Arthritis Father      Emphysema Paternal Grandfather        SOCIAL HISTORY:   Social History     Tobacco Use     Smoking status: Never Smoker     Smokeless tobacco: Never Used   Substance Use Topics     Alcohol use: Never         OBJECTIVE:   1. VITAL SIGNS:          2. INTAKE/ OUTPUT:        3. PHYSICAL EXAMINATION:     General: intubated and sedated  Neuro: can't be assessed  Resp: Breathing non-labored  CV: S1, S2, RRR, no m/r/g   Abdomen: Soft, non-distended, non-tender  Incisions: c/d/i  Extremities: warm and well perfused      4. INVESTIGATIONS:   Arterial Blood Gases   Recent Labs   Lab 10/16/21  1127 10/16/21  1046 10/16/21  1027 10/16/21  0746   PH 7.35 7.39 7.42 7.41   PCO2 44 46* 44 47*   PO2 283* 312* 408* 214*   HCO3 25 28 28 29*     Complete Blood Count   Recent Labs   Lab 10/16/21  1127 10/16/21  1046 10/16/21  1027 10/16/21  1001 10/16/21  0746 10/15/21  0916 10/14/21  0943 10/14/21  0943 10/12/21  1038 10/12/21  1038 10/10/21  1024 10/10/21  1024   WBC  --   --   --   --   --  22.9*  --  24.3*  --  28.5*  --  26.5*   HGB 8.9* 9.7* 9.5* 7.5*   < > 11.1*   < > 11.8*   < > 11.1*   < > 11.6*   PLT  --   --   --   --   --  224  --  258  --  208  --  221    < > = values in this interval not displayed.     Basic Metabolic Panel  Recent Labs   Lab 10/16/21  1127 10/16/21  1046 10/16/21  1027 10/16/21  1001 10/15/21  1234 10/15/21  0907 10/10/21  1201 10/10/21  1024    138 138 138   < > 137   < > 139   POTASSIUM 3.7 4.3 4.1 4.4   < > 3.4   < > 3.6   CHLORIDE  --   --   --   --   --  105  --  104   CO2  --   --   --   --   --  25  --  29   BUN  --   --   --   --   --  14  --  19   CR  --   --   --   --   --  0.65*  --  0.69   * 214* 191* 130*   < > 129*   < > 106*    < > = values in this interval not displayed.     Liver Function Tests  Recent Labs   Lab 10/15/21  0907   AST 16   ALT 30   ALKPHOS 68   BILITOTAL 0.4   ALBUMIN 2.9*   INR 1.02      Pancreatic Enzymes  No lab results found in last 7 days.  Coagulation Profile  Recent Labs   Lab 10/15/21  0907   INR 1.02   PTT 30         5. RADIOLOGY:   No results found for this or any previous visit (from the past 24 hour(s)).    =========================================

## 2021-10-16 NOTE — PROGRESS NOTES
Admitted/transferred from: OR  Reason for admission/transfer: Lung transplant  2 RN skin assessment: completed by writer and cir RN  Result of skin assessment and interventions/actions: turn and repositioned q2hr  Patient belongings given to wife. Glasses, phone, and phone  in safe.    ?

## 2021-10-16 NOTE — BRIEF OP NOTE
[facility]    Brief Operative Note    Pre-operative diagnosis: Rheumatoid disease (H) [M06.9] with ILD  Post-operative diagnosis Same as pre-operative diagnosis    Procedure: Procedure(s):  TRANSPLANT, LUNG, RECIPIENT, BILATERAL, Bronchoscopy, on-pump perfusion, bilateral clamshell sternotomy  Surgeon: Surgeon(s) and Role:     * Yanick Corral MD - Primary     * Efe Castro MD - Assisting     * Kenna Mendoza MD - Assisting  Anesthesia: General   Estimated Blood Loss: 500 ml    Drains: Chest tubes  Specimens:   ID Type Source Tests Collected by Time Destination   1 : left native lung - to bionet first Tissue Lung, Left SURGICAL PATHOLOGY EXAM Yanick Corral MD 10/16/2021  9:23 AM    2 :  Right native lung - to bionet first Tissue Lung, Right SURGICAL PATHOLOGY EXAM Yanick Corral MD 10/16/2021  9:24 AM    3 : Native lung - level 5 Tissue Lymph Node(s) AFB CULTURE AND STAIN NON BLOOD, FUNGAL OR YEAST CULTURE ROUTINE, SURGICAL PATHOLOGY EXAM Yanick Corral MD 10/16/2021  9:30 AM    A : Donor Bronchial washings [place] Bron[place] AFB CULTURE AND STAIN NON BLOOD, GRAM STAIN, FUNGAL OR YEAST CULTURE ROUTINE, VIRAL CULTURE RESPIRATORY, AEROBIC BACTERIAL CULTURE ROUTINE Yanick Corral MD 10/16/2021  9:26 AM    B : Recipient Bronchial washings [place] Bron[place] AFB CULTURE AND STAIN NON BLOOD, GRAM STAIN, FUNGAL OR YEAST CULTURE ROUTINE, VIRAL CULTURE RESPIRATORY, AEROBIC BACTERIAL CULTURE ROUTINE Yanick Corral MD 10/16/2021  9:29 AM      Findings:   Hilar adenopathy, thickened pleural fat, good size match for airway and vessels.  Complications: None.  Implants: * No implants in log *

## 2021-10-16 NOTE — ANESTHESIA CARE TRANSFER NOTE
Patient: Edson Thornton    Procedure: Procedure(s):  TRANSPLANT, LUNG, RECIPIENT, BILATERAL, Bronchoscopy, on-pump perfusion, bilateral clamshell sternotomy       Diagnosis: Rheumatoid disease (H) [M06.9]  Diagnosis Additional Information: No value filed.    Anesthesia Type:   General     Note:    Oropharynx: ventilatory support and endotracheal tube in place    Patient oxygen source: ambu.      Dentition: dentition unchanged  Vital Signs Stable: post-procedure vital signs reviewed and stable  Report to RN Given: handoff report given  Patient transferred to: ICU  Comments: To icu monitored and 02/ambu  Placed on ventilator RR20, , fio2 100%, peep 5  VSS   Report given to ICU team  ICU Handoff: Call for PAUSE to initiate/utilize ICU HANDOFF, Identified Patient, Identified Responsible Provider, Reviewed the Pertinent Medical History, Discussed Surgical Course, Reviewed Intra-OP Anesthesia Management and Issues during Anesthesia, Set Expectations for Post Procedure Period and Allowed Opportunity for Questions and Acknowledgement of Understanding      Vitals:  Vitals Value Taken Time   BP     Temp     Pulse 117 10/16/21 1542   Resp     SpO2 99 % 10/16/21 1542   Vitals shown include unvalidated device data.    Electronically Signed By: ILIR Taylor CRNA  October 16, 2021  3:43 PM

## 2021-10-16 NOTE — ANESTHESIA PROCEDURE NOTES
Arterial Line Procedure Note    Pre-Procedure   Staff -        Anesthesiologist:  Ronnie Ron MD       Resident/Fellow: Ricardo Roca MD       Performed By: fellow       Location: OR       Pre-Anesthestic Checklist: patient identified, IV checked, risks and benefits discussed, informed consent, monitors and equipment checked, pre-op evaluation and at physician/surgeon's request  Timeout:       Correct Patient: Yes        Correct Procedure: Yes        Correct Site: Yes        Correct Position: Yes   Procedure   Procedure: arterial line and new line       Laterality: right       Insertion Site: radial.  Sterile Prep        Standard elements of sterile barrier followed       Skin prep: Chloraprep  Insertion/Injection        Technique: ultrasound guided and Seldinger Technique        1. Ultrasound was used to evaluate the access site.       2. Artery evaluated via ultrasound for patency/adequacy.       3. Using real-time ultrasound the needle/catheter was observed entering the artery/vein.       5. The visualized structures were anatomically normal.       6. There were no apparent abnormal pathologic findings.       Catheter Type/Size: 20 G, 12 cm  Narrative         Secured by: anchor securement device       Tegaderm dressing used.       Complications: None apparent,        Arterial waveform: Yes        IBP within 10% of NIBP: Yes

## 2021-10-16 NOTE — ANESTHESIA PROCEDURE NOTES
Airway       Patient location during procedure: OR       Procedure Start/Stop Times: 10/16/2021 2:45 PM  Staff -        CRNA: Amina Chase APRN CRNA       Performed By: CRNA  Consent for Airway        Urgency: elective  Indications and Patient Condition       Indications for airway management: orville-procedural (ETT exchanged)       Induction type:intravenous       Mask difficulty assessment: 0 - not attempted    Final Airway Details       Final airway type: endotracheal airway       Successful airway: ETT - single  Endotracheal Airway Details        ETT size (mm): 8.0       Cuffed: yes       Successful intubation technique: video laryngoscopy       VL Blade Size: MAC 3       Grade View of Cords: 1       Secured at (cm): 22    Post intubation assessment        Placement verified by: capnometry, equal breath sounds and chest rise        Number of attempts at approach: 1       Ease of procedure: easy       Dentition: Intact    Additional Comments       DL ETT exchanged without difficulty for a single ETT

## 2021-10-16 NOTE — ANESTHESIA PROCEDURE NOTES
Central Line/PA Catheter Placement    Pre-Procedure   Staff -        Anesthesiologist:  Ronnie Ron MD       Resident/Fellow: Ricardo Roca MD       Performed By: fellow       Location: OR       Pre-Anesthestic Checklist: patient identified, IV checked, site marked, risks and benefits discussed, informed consent, monitors and equipment checked, pre-op evaluation and at physician/surgeon's request  Timeout:       Correct Patient: Yes        Correct Procedure: Yes        Correct Site: Yes        Correct Position: Yes        Correct Laterality: Yes     Procedure   Procedure: central line       Laterality: right       Insertion Site: internal jugular.       Patient Position: Trendelenburg  Sterile Prep        All elements of maximal sterile barrier technique followed       Patient Prep/Sterile Barriers: draped, hand hygiene, gloves , hat , mask , draped, gown, sterile gel and probe cover       Skin prep: Chloraprep  Insertion/Injection        Technique: ultrasound guided and Seldinger Technique        1. Ultrasound was used to evaluate the access site.       2. Vein evaluated via ultrasound for patency/adequacy.       3. Using real-time ultrasound the needle/catheter was observed entering the artery/vein.       5. The visualized structures were anatomically normal.       6. There were no apparent abnormal pathologic findings.       Introducer Type: 9 Fr, 2-lumen MAC        PA Catheter Type: CCO         Appropriate RV, RA and PA waveforms noted:  Yes            Withdrawn and Locked at cm: 43  Narrative         Secured by: suture       Tegaderm and Biopatch dressing used.       Complications: None apparent,        blood aspirated from all lumens,        All lumens flushed: Yes       Verification method: Ultrasound and Placement to be verified post-op

## 2021-10-16 NOTE — OP NOTE
DATE OF SERVICE:  10/16/2021      PREOPERATIVE DIAGNOSES:   1.  Severe end-stage life-threatening lung disease due to interstitial lung disease  2.  Rheumatoid arthritis  3.  Recent HSV infection  4.  Steroid induced diabetes  5.  Hypothyroidism  6.  Obstructive sleep apnea  7.  Hypertension  8.  Anxiety/depression     POSTOPERATIVE DIAGNOSES:     1.  Severe end-stage life-threatening lung disease due to interstitial lung disease  2.  Rheumatoid arthritis  3.  Recent HSV infection  4.  Steroid induced diabetes  5.  Hypothyroidism  6.  Obstructive sleep apnea  7.  Hypertension  8.  Anxiety/depression     PROCEDURES:   1.  Bilateral anterolateral thoracotomies with transverse sternotomy and central cannulation for cardiopulmonary bypass.   2.  Bilateral transplant pneumonectomies.   3.  Back table preparation of bilateral single lung for sequential lung transplantation.   4.  Bilateral sequential lung transplantation.      SURGEON:  Yanick Corral MD    CO SURGEON: Efe Castro MD (a second surgeon was needed given the critical illness of the patient, the complexity of the procedure, and the lack of a qualified fellow for the entirety of the operation; we each performed distinct portions of the surgery)     RESIDENT SURGEON:  Karen Cardozo MD      ANESTHESIA:  General endotracheal anesthesia with a double-lumen endotracheal tube.      ESTIMATED BLOOD LOSS:  1  liter.      SPECIMENS:  Bilateral native lung.      INDICATIONS:  Mr. Thornton is a 56-year-old man with rapidly progressively worsening ILD related to rheumatoid arthritis.  He has undergone multidisciplinary evaluation by the lung transplant team and is found to be an appropriate candidate for lung transplant.  A suitable donor is available.  He understands the risks and benefits of the procedure and wishes to proceed     OPERATIVE FINDINGS:  There were no pleural-pleural adhesions.  There was moderate to severe bilateral hilar lymphadenopathy.  After the  completion of each bronchial anastomosis, flexible fiberoptic bronchoscopy was performed and demonstrated intact patent anastomoses.  The patient was ventilating and oxygenating well at the end of the procedure.  Hemostasis was noted at the end of the procedure.  He weaned from cardiopulmonary bypass on moderate dose inotropic support. He tolerated chest closure well with minimal hemodynamic or ventilatory effect.     FINAL ABO CROSSMATCH VERIFICATION:  After the donor organ arrived to the operating room and prior to anastomosis, I participated in the transplant preverification upon organ receipt timeout by visually verifying the donor organ, organ and laterality, donor blood type, recipient unique identifier, recipient blood type, and that the donor and recipient are blood type compatible.      OPERATIVE DESCRIPTION:  After obtaining informed consent, the patient was brought to the operating room and placed in the supine position on the operating room table.  Appropriate lines and wishes for monitoring were placed by the anesthesia team.  The patient underwent smooth induction of general anesthesia and the trachea was intubated orally with a double-lumen endotracheal tube.  A timeout was performed to confirm the correct patient identity as well as the procedure to be performed. The arms were raised, padded, and secured to the ether screen.      Bilateral anterolateral thoracotomies and a transverse sternotomy were performed.  The pericardium was opened.       The right inferior pulmonary ligament was divided with electrocautery.  Hilar dissection was carried out around the right side circumferentially.      This was performed similarly on the left.     The patient was given full dose heparin, and after cannulation of the distal ascending aorta and the right atrium, and placement of a PA vent, cardiopulmonary bypass was commenced.    A left native lung pneumonectomy was performed as follows:  The left inferior  pulmonary vein was divided with electrocautery.  The left superior and inferior pulmonary veins were dissected out circumferentially and ligated and divided with endoscopic staplers.  A Derra clamp was placed proximally on the left pulmonary artery and this was divided distally at the point of takeoff of the anteroapical trunk.  Once lymphatics were dissected off of the left main stem bronchus, the left main stem bronchus was divided sharply with a #10 blade scalpel.  Hemostasis of the bronchiolar arteries was achieved with Hemoclips.  The left native lung was delivered from the field as a specimen.  The left pulmonary vein stumps were grasped with Allis clamps and retracted laterally.  The pericardium was opened circumferentially around the sewing cuff and the ligament of Rich was divided with electrocautery.      The donor lungs were prepared on the back table as follows:  The attached pericardium and the lymphatics were divided in the midline.  The left atrial cuff was divided in the midline.  The main pulmonary artery was divided along the raphe.  The left main stem bronchus was divided at the level of shiv.  The right lung was then prepared by  the right pulmonary artery from the atrial cuff with sharp dissection and the bronchus was divided 2 tracheal rings distal to the takeoff of the right upper lobe bronchus.    The left lung was brought into left pleural space in appropriate orientation.  The left bronchial anastomosis was performed in end-to-end fashion using running 4-0 Prolene.  The left pulmonary artery anastomosis was performed in end-to-end fashion using running 5-0 Prolene.  A Kimberlee clamp was applied proximally across the left atrium and the left pulmonary vein stumps were resected sharply and the venotomies were connected sharply to create a left atrial sewing cuff.  This was sewn in an end-to-end fashion to the donor left pulmonary vein sewing cuff with 4-0 Prolene.  The posterior  wall was imbricated while the anterior wall was run in continuous fashion.  This anastomosis was deaired by releasing the Derra clamp on the pulmonary artery to confirm flow through the pulmonary artery anastomosis.  The Kimberlee clamp was released on the left atrium for further de-airing before tying down the sutures.     The right superior, middle and inferior pulmonary veins were ligated and divided with a vascular load of an endoscopic stapler.  The pulmonary artery was dissected out circumferentially, and a Derra clamp was placed as far proximally as possible.  The pulmonary artery was divided distally at the point of takeoff of the anterior apical trunk.  The lymphatics surrounding the right main stem bronchus were ligated with Hemoclips and divided distally with electrocautery.  The right main stem bronchus was divided with a #10 blade scalpel.  Hemostasis of the bronchiolar arteries was achieved with Hemoclips.  The pulmonary vein stumps were grasped and retracted laterally.  The pericardium was opened circumferentially around the pulmonary veins.      The right donor lung was brought into the right pleural space in appropriate orientation.  The bronchial anastomosis was performed in end-to-end fashion using running 4-0 Prolene.  The right pulmonary artery anastomosis was performed in end-to-end fashion using running 5-0 Prolene.  A Kimberlee clamp was applied proximally across the left atrial cuff.  The pulmonary veins stumps were resected sharply and connected to form a sewing cuff.  The pulmonary vein/left atrial sewing cuff anastomosis was then sewn in an end-to-end fashion using running 4-0 Prolene, taking care to imbricate the posterior wall while the anterior wall was completed in running continuous fashion.  This anastomosis was deaired by releasing the Derra clamp on the pulmonary artery and then the Kimberlee clamp before tying down the sutures.  Hemostasis was confirmed.     Ventilation was commenced.  The  patient weaned from cardiopulmonary bypass on moderate dose inotropic support and nitric oxide.  The inhaled agent was started primarily for pulmonary hypertension, which appeared to be partially related to hypercarbia as well.    The patient was decannulated.  Protamine was given.  All bleeding points were controlled.  Topical hemostatic agents were applied.  A 28-Vietnamese straight chest tube was placed anteriorly and apically on the right side and brought out through a separate stab incision.  A 28-Vietnamese right-angle chest tube was placed in the diaphragmatic sulcus on the right side and brought out through a separate stab incision.  A 28-Vietnamese straight chest tube was placed anteriorly and apically on the left side and brought out through a separate stab incision.  A 28-Vietnamese right-angle chest tube was placed in the left diaphragmatic sulcus and brought out through a separate stab incision.  A 24-Vietnamese Sterling drain was placed in the pericardial space and brought out through a separate stab incision.      The transverse sternotomy was closed with 3 simple interrupted #6 stainless steel sternal wires to reapproximate the edges of the sternum. Heavy pericostal sutures were placed around the ribs on each side.      The muscle, fascia and skin were closed in layers with absorbable suture.  The skin closure was reinforced with staples and then a wound VAC.  All needle and instrument counts were correct at the end of case. The sponge count was off by one, but no retained object was noted on radiographic examination.    The double lumen tube was exchanged for a single lumen tube, and therapeutic bronchoscopy performed to clear secretions from the airways.    The patient was taken to the ICU in critical but stable condition.    Total cardiopulmonary bypass time was 136 minutes. Right lung total ischemic time was 4 hours and 26 minutes and warm ischemic time was 30 minutes. Left lung total ischemic time was 3 hours and 29  minutes and warm ischemic time was 33 minutes.        Yanick Corral MD

## 2021-10-16 NOTE — ANESTHESIA POSTPROCEDURE EVALUATION
Patient: Edson Thornton    Procedure: Procedure(s):  TRANSPLANT, LUNG, RECIPIENT, BILATERAL, Bronchoscopy, on-pump perfusion, bilateral clamshell sternotomy       Diagnosis:Rheumatoid disease (H) [M06.9]  Diagnosis Additional Information: No value filed.    Anesthesia Type:  General    Note:  Disposition: Inpatient; ICU            ICU Sign Out: Anesthesiologist/ICU physician sign out WAS performed   Postop Pain Control: Uneventful            Sign Out: Well controlled pain   PONV: No   Neuro/Psych: Uneventful            Sign Out: Acceptable/Baseline neuro status   Airway/Respiratory: Uneventful            Sign Out: AIRWAY IN SITU/Resp. Support   CV/Hemodynamics: Uneventful            Sign Out: Acceptable CV status; No obvious hypovolemia; No obvious fluid overload   Other NRE: NONE   DID A NON-ROUTINE EVENT OCCUR? No           Last vitals:  Vitals Value Taken Time   /65 10/16/21 1734   Temp 36.5 10/16/21 1734   Pulse 88 10/16/21 1734   Resp 22 10/16/21 1734   SpO2 99 % 10/16/21 1734   Vitals shown include unvalidated device data.    Electronically Signed By: Ronnie Ron MD  October 16, 2021  5:36 PM

## 2021-10-16 NOTE — ANESTHESIA PROCEDURE NOTES
Airway       Patient location during procedure: OR       Procedure Start/Stop Times: 10/16/2021 7:25 AM  Staff -        CRNA: Amina Chase APRN CRNA       Performed By: CRNA  Consent for Airway        Urgency: elective  Indications and Patient Condition       Indications for airway management: orville-procedural       Induction type:intravenous       Mask difficulty assessment: 1 - vent by mask    Final Airway Details       Final airway type: endotracheal airway       Successful airway: ETT - double lumen left  Endotracheal Airway Details        Cuffed: yes       Successful intubation technique: direct laryngoscopy       DL Blade Type: Estrada 2       Grade View of Cords: 1       Adjucts: stylet       ETT Double lumen (fr): 35    Post intubation assessment        Placement verified by: capnometry, equal breath sounds and chest rise        Number of attempts at approach: 1       Ease of procedure: easy       Dentition: Intact

## 2021-10-16 NOTE — PROGRESS NOTES
Pt received Bilateral Lung transplant on 10/16/2021, donor ID [id number], removed from [facility] transplant waitlist.

## 2021-10-16 NOTE — PROGRESS NOTES
"Transfer  Transferred to: Pre-op  Via:bed  Reason for transfer: Possible lung transplant   Family: Aware of transfer  Belongings: Remain in patient's room   Chart: Delivered with pt to next unit  Medications: N/A. Spoke with pharmacy yesterday regarding pre-op meds. Stated they will send to pre-op.   Report given to: PACU RN   Pt status: BP (!) 145/90 (BP Location: Left arm)   Pulse 73   Temp 98.6  F (37  C) (Oral)   Resp 20   Ht 1.626 m (5' 4\")   Wt 68.9 kg (152 lb)   SpO2 99%   BMI 26.09 kg/m      "

## 2021-10-16 NOTE — CONSULTS
Pulmonary Medicine  Cystic Fibrosis - Lung Transplant Team  Initial Consultation  2021      Patient: Edson Thornton  MRN: [id number]  : 1965 (age 56 year old)  Transplant: 10/16/2021 (Lung), POD#1  Admission date: 2021  Primary Care Provider: Lia Spicer    Assessment & Plan:     Edson Thornton is a 56 year old male with a PMH significant for NSIP/ILD, bronchiectasis, moderate PH, RA, SALTY, chronic HSV infection, hypogammaglobulinemia, steroid-induced diabetes, hypothyroidism, HTN, HLD, anxiety, and depression.  Admitted on 21 from OSH for acute on chronic respiratory failure 2/2 ILD exacerbation.  Pt. is now s/p BSLT on 10/16/21.  Surgery relatively uncomplicated.  The patient is currently POD #1 in the CVICU.    S/p BSLT for ILD:  Acute hypoxic/hypercapneic respiratory failure: Patient is currently on epinephrine for low CI with variable pO2 on current vent settings (morning pO2 44 from 87) on CMV //, continues on Caroline at 20.  Unfortunately had not received vaccination for flu, PNA, or COVID-19 PTA (not available after admission d/t immunosuppression state).   - Nebs: levalbuterol and Mucomyst QID (ordered)  - Aggressive pulmonary toilet with chest physiotherapy QID (addition of Aerobika and incentive spirometry once extubated)  - DSA at one month post-transplant, additionally prn  - Ventilator management per ICU team, agree with increasing PEEP to 8 to aid in recruitment  - Our team will perform bronchoscopy on day of anticipated extubation, defer today  - Anesthesia to place erector spinae catheters prior to anticipated extubation per our routine, defer today  - Chest tubes managed by surgical team  - CXR today with new RLL focal opacity and marked increase in diffuse interstitial opacities concerning for pulmonary edema (personally reviewed), recommend to repeat this afternoon  - Placement of NJ as soon as able, trickle feeds via OG in the interim  - Recommend SLP  consult as pt. nears extubation for swallowing evaluation  - Await explant pathology (should include mediastinal LN)    Immunosuppression:  - Induction therapy with basiliximab (and high dose IV steroid) given intraoperatively, repeating basiliximab dose on POD#4 (ordered)  - Tacrolimus 1 mg SL BID.  Goal tacrolimus level 8-12.  Continuing SL dosing until tacro goal is achieved (target: within 7 days post-op) and return of bowel function post-op.  Daily tacrolimus levels for the first 1-2 weeks with dose adjustments prn based on levels and changes in medications/patient condition.  See below for initial levels and dosing trends:  Date Tacro Level Intervention   10/17 N/A Tacro SL started today             - MMF 1500 mg BID (on PTA, AZA to be avoided given TPMT)  - Methylprednisolone 125 mg x 3 doses, then prednisolone 17.5 mg BID with taper per lung transplant protocol:  Date AM dose (mg) PM dose (mg)   10/17/21 17.5 17.5   10/24/21 15 15   10/31/21 15 12.5   11/7/21 12.5 12.5   11/21/21 12.5 10   12/5/21 10 10   1/2/22 10 7.5   1/30/22 7.5 7.5   2/27/22 7.5 5   3/27/22 5 5   4/24/22 5 2.5     Prophylaxis:   - Bactrim for PJP ppx deferred initially post-op pending assessment of renal function  - Nystatin for oral candidiasis ppx, 6 month course  - See below for serologies and viral ppx:   Donor Recipient Intervention   CMV status Negative Negative None   EBV status Positive Positive N/A, EBV monthly   HSV status N/A Positive Acyclovir POD #1-30   (recent infection history)     Primary graft dysfunction (per ISHLT guidelines):  See chart below:   POD #0  (~0 hours) POD #1  (~24 hours) POD #2   (~48 hours) POD#3   (~72 hours)   Date 10/16 10/17 10/18 10/19   Time 1536      Intubated Y Y/N Y/N Y/N   PaO2 227      FiO2 100      P/F Ratio 227      PGD Grade   (0=mild, 3=severe) 2      ECMO N Y/N Y/N Y/N   Inhaled NO/Flolan Y Y/N Y/N Y/N   ISHLT PGD Scoring  Grade 0 - PaO2/FiO2 >300 and normal chest radiograph (absence of  diffuse allograft infiltrates)  Grade 1 - PaO2/FiO2 >300 and diffuse allograft infiltrates on chest radiograph  Grade 2 - PaO2/FiO2 between 200 and 300  Grade 3 - PaO2/FiO2 <200 and/or on ECMO    ID: Concern for possible strongyloides exposure pre-transplant s/p ivermectin x1 dose (9/17)  - Donor cultures NGTD (UNOS data personally reviewed this morning)  - Recipient cultures NGTD (including fungal and AFB)   - Recommend to pan culture this morning with fever (Tmax 100.6)  - Monthly Coccidioises Ag x12 months post-transplant per ID (urine and serum pending 10/17)  - IV ceftaz/vancomycin for 48h per protocol, will adjust antibiotic plan based on results of the above cultures  - Low threshold for transplant ID consult given pre-transplant ID history    HSV: Chronic intermittent active infection pre-transplant with recent HSV infection: crusted lesions throughout left side of jaw, s/p 10 day treatment course of ACV through 10/9.  - ACV ppx as above (starting on POD #1 instead of POD #8 given HSV history and location)    Hypogammaglobulinemia: IgG previously low at 364 (9/7).  Noted at 265 at time of transplant (10/15).  - Plan to give IVIG with premedication when appropriate given fluid status, defer today    PHS risk criteria donor:  Additional labs required post-transplant (between 4-8 weeks post-op): Hepatitis B, Hepatitis C, and HIV by VISHAL ([id number], ordered POD #30).    SALTY: Noted pre-transplant. Home CPAP 6-12 cmH2)  - Resume BiPAP at night post-extubation    Other relevant problems being managed by primary team:    Elevated LFTs: Possible shock liver post-op.  Initial ALT//230 evening of surgery, now with marked increase to 1550/1246.  - Daily LFTs ordered  - Recommend RUQ US    BRENNAN: Baseline creatinine 0.7, increased to 1.2 POD #1.  S/p 3.5 L volume resuscitation the first 12 hours post-op for rising lactic acid (peak 9).  UO also downtrending.  - Bactrim held as above    We appreciate the excellent care  provided by the CVTS and CVICU teams.  Recommendations communicated via in person rounding and this note.  Will continue to follow along closely, please do not hesitate to call with any questions or concerns.    Patient seen and discussed with Dr. Sifuentes.    Renetta Holloway, DNP, APRN, CNP  Inpatient Nurse Practitioner  Pulmonary CF/Transplant     Chief Complaint:     S/p BSLT    History of Present Illness:     History obtained from chart.    Pt. initially admitted 9/5 from an OSH ([facility]) for acute on chronic respiratory failure refractory to conservative intervention.  Lung transplant evaluation started 9/7 and pt. was ultimately listed on the transplant waitlist 9/27.  Most recent LAS 56.2736 (updated 9/29).  Treated with steroid burst and taper (decreased to 15 mg daily on 10/12) as well as ABX.  Unable to discharge prior to lung transplant as he was requiring HFNC during the day and BiPAP with sleep.  Good activity tolerance and conditioning, had been up independently in his room and ambulating in  halls.      Pt. is now s/p BSLT, surgery relatively uncomplicated on CPB and pt. was on moderate inotropic support with vaso and levo, weaned off and transitioned to epi for low cardiac output.  Also on Caroline post-op d/t pulmonary hypertension and hypercapnia, continues.  ABGs with low pO2 after decrease in FiO2 from 70 to 65% this morning.  Fluid resuscitated yesterday evening and overnight with 3L LR and 500 ml albumin, CVP increased from 4 to 8 but now UO decreasing with new BRENNAN.  LFTs elevated post-op.  Remains deeply sedated and not responsive.      Review of Systems:     Complete ROS negative except as noted above.    Medical and Surgical History:     Past Medical History:   Diagnosis Date     Anxiety      Depression      HLD (hyperlipidemia)      HTN (hypertension)      Hypothyroidism      ILD (interstitial lung disease) (H)      SALTY on CPAP      Oxygen dependent     BL 4L since ~6/2021     Rheumatoid  arthritis (H)     signs ~5/2020, dx 5/2021     Steroid-induced hyperglycemia      Traction bronchiectasis (H)      Past Surgical History:   Procedure Laterality Date     COLONOSCOPY W/ BIOPSIES AND POLYPECTOMY  07/21/2020     CV CORONARY ANGIOGRAM N/A 9/8/2021    Procedure: Coronary Angiogram with possible intervention;  Surgeon: Jovon Bullock MD;  Location:  [facility]ARD[facility] CATH LAB     CV RIGHT HEART CATH MEASUREMENTS RECORDED N/A 9/8/2021    Procedure: Right Heart Cath;  Surgeon: Jovon Bullock MD;  Location:  HEART CARDIAC CATH LAB     EXTRACTION(S) DENTAL N/A 9/22/2021    Procedure: EXTRACTION tooth #19;  Surgeon: Deepak Tobin DDS;  Location: [facility] OR     HERNIA REPAIR       right acl       XR ACROMIOCLAVICULAR JOINT BILATERAL       Social and Family History:     Social History     Socioeconomic History     Marital status: Single     Spouse name: Not on file     Number of children: Not on file     Years of education: Not on file     Highest education level: Not on file   Occupational History     Not on file   Tobacco Use     Smoking status: Never Smoker     Smokeless tobacco: Never Used   Substance and Sexual Activity     Alcohol use: Never     Drug use: Never     Sexual activity: Not on file   Other Topics Concern     Parent/sibling w/ CABG, MI or angioplasty before 65F 55M? Not Asked   Social History Narrative     Not on file     Social Determinants of Health     Financial Resource Strain:      Difficulty of Paying Living Expenses:    Food Insecurity:      Worried About Running Out of Food in the Last Year:      Ran Out of Food in the Last Year:    Transportation Needs:      Lack of Transportation (Medical):      Lack of Transportation (Non-Medical):    Physical Activity:      Days of Exercise per Week:      Minutes of Exercise per Session:    Stress:      Feeling of Stress :    Social Connections:      Frequency of Communication with Friends and Family:      Frequency of Social  Gatherings with Friends and Family:      Attends [employer] Services:      Active Member of Clubs or Organizations:      Attends Club or Organization Meetings:      Marital Status:    Intimate Partner Violence:      Fear of Current or Ex-Partner:      Emotionally Abused:      Physically Abused:      Sexually Abused:      Family History   Problem Relation Age of Onset     Diabetes Type 1 Mother      Heart Disease Mother      Chronic Obstructive Pulmonary Disease Mother      Rheumatoid Arthritis Father      Emphysema Paternal Grandfather      Allergies and Home Medications:   No Known Allergies  Medications Prior to Admission   Medication Sig Dispense Refill Last Dose     acetaminophen (TYLENOL) 325 MG tablet Take 325 mg by mouth every 6 hours as needed for mild pain   8/29/2021     amLODIPine (NORVASC) 5 MG tablet Take 5 mg by mouth daily   9/5/2021 at 0813     escitalopram (LEXAPRO) 5 MG tablet Take 5 mg by mouth daily   9/4/2021 at 2006     fluticasone-vilanterol (BREO ELLIPTA) 200-25 MCG/INH inhaler Inhale 1 puff into the lungs daily   8/29/2021     insulin aspart (NOVOLOG FLEXPEN) 100 UNIT/ML pen Inject 3-11 Units Subcutaneous With meals and at bedtime.   8/29/2021     levothyroxine (SYNTHROID/LEVOTHROID) 25 MCG tablet Take 25 mcg by mouth daily   9/5/2021 at 0812     omeprazole (PRILOSEC) 40 MG DR capsule Take 40 mg by mouth 2 times daily (before meals)   9/5/2021 at 0811     potassium chloride ER (KLOR-CON M) 20 MEQ CR tablet Take 20 mEq by mouth daily   8/29/2021     sulfamethoxazole-trimethoprim (BACTRIM DS) 800-160 MG tablet Take 1 tablet by mouth Every Mon, Wed, Fri Morning    8/30/2021     Current Scheduled Meds    acetaminophen  975 mg Oral or Feeding Tube Q8H     acetylcysteine  2 mL Nebulization 4x Daily     acyclovir  400 mg Oral or NG Tube BID    Or     acyclovir  400 mg Oral or NG Tube BID     albumin human         [START ON 10/20/2021] basiliximab (SIMULECT) infusion  20 mg Intravenous Once      "[START ON 10/24/2021] calcium carbonate 600 mg-vitamin D 400 units  1 tablet Oral or Feeding Tube BID w/meals     calcium gluconate  3 g Intravenous Once     cefTAZidime  2 g Intravenous Q8H     gabapentin  100 mg Oral or Feeding Tube TID     levalbuterol  1.25 mg Nebulization 4x Daily     methocarbamol  500 mg Oral 4x Daily     prednisoLONE  17.5 mg Oral or NG Tube BID    Followed by     methylPREDNISolone  125 mg Intravenous Q8H     mycophenolate  1,500 mg Oral BID    Or     mycophenolate  1,500 mg Oral or NG Tube BID     nystatin  1,000,000 Units Swish & Swallow 4x Daily     pantoprazole  40 mg Per Feeding Tube BID    Or     pantoprazole (PROTONIX) IV  40 mg Intravenous BID     polyethylene glycol  17 g Oral or Feeding Tube Daily     potassium chloride  20 mEq Oral Daily     senna-docusate  1 tablet Oral or Feeding Tube BID     sodium chloride (PF)  3 mL Intracatheter Q8H     [Held by provider] sulfamethoxazole-trimethoprim  10 mL Oral or NG Tube Daily    Or     [Held by provider] sulfamethoxazole-trimethoprim  1 tablet Oral or NG Tube Daily     tacrolimus  1 mg Sublingual BID IS     vancomycin  1,000 mg Intravenous Q12H      Current PRN Meds  bisacodyl, dextrose, glucose **OR** dextrose **OR** glucagon, diphenhydrAMINE **OR** diphenhydrAMINE, HYDROmorphone **OR** HYDROmorphone, lidocaine 4%, lidocaine (buffered or not buffered), magnesium hydroxide, methocarbamol, naloxone **OR** naloxone **OR** naloxone **OR** naloxone, ondansetron **OR** ondansetron, oxyCODONE **OR** oxyCODONE, prochlorperazine **OR** prochlorperazine, BETA BLOCKER NOT PRESCRIBED, sodium chloride (PF)     Physical Exam:     Vital signs:  Temp: 99.5  F (37.5  C) Temp src: Bladder   Pulse: 90   Resp: 20 SpO2: 98 % O2 Device: Mechanical Ventilator Oxygen Delivery: 15 LPM Height: 162.6 cm (5' 4\") Weight: 73 kg (160 lb 15 oz)  I/O:     Intake/Output Summary (Last 24 hours) at 10/16/2021 0910  Last data filed at 10/16/2021 0808  Gross per 24 hour "   Intake 487 ml   Output 1520 ml   Net -1033 ml     Constitutional: Lying in bed, intubated, in no apparent distress.   HEENT: Eyes with pink conjunctivae, anicteric.  Orally intubated. Neck supple without lymphadenopathy.   PULM: Diminished bases bilaterally R>L.  Coarse crackles to RML/RLL, no rhonchi, no wheezes.  Moderately-labored breathing on full vent support (improved after airway clearance regimen).  CV: Normal S1 and S2.  RRR.  No murmur, gallop, or rub.  No peripheral edema.  Chest tube chambers x2, tidaling with air leak to mediastinal chamber  ABD: BS hypoactive, soft, nontender, nondistended.    MSK: No apparent muscle wasting.   NEURO: Sedated, not conversant.   SKIN: Warm, diaphoretic. No rash on limited exam.   PSYCH: Calm. ?     Lines, Drains, and Devices:  PICC Triple Lumen Right  (Active)   Site Assessment WDL 10/16/21 0555   External Cath Length (cm) 0 cm 10/11/21 1140   Dressing Intervention Chlorhexidine patch;Transparent 10/16/21 0555   Dressing Change Due 10/18/21 10/16/21 0000   PICC Comment CDI 10/14/21 1700   [place] - Status saline locked;blood return noted 10/16/21 0555   [place] - Cap Change Due 10/19/21 10/16/21 0000   Red - Status saline locked;blood return noted 10/16/21 0555   Red - Cap Change Due 10/19/21 10/16/21 0000   White - Status blood return noted;infusing 10/16/21 0555   White - Cap Change Due 10/19/21 10/16/21 0000   Extravasation? No 10/14/21 1700   Line Necessity Yes, meets criteria 10/16/21 0555   Number of days:        Introducer 10/16/21 Internal jugular Right (Active)   Number of days: 0       Arterial Line 10/16/21 (Active)   Number of days: 0       Pulmonary Artery Catheter - Single Lumen 10/16/21 0833 Right internal jugular vein continuous hemodynamic catheter (Active)   Number of days: 0     Results:     LABS    CMP:   Recent Labs   Lab 10/17/21  0645 10/17/21  0601 10/17/21  0458 10/17/21  0346 10/16/21  2109 10/16/21  2032 10/16/21  1538 10/16/21  1535  10/16/21  1405 10/15/21  1234 10/15/21  0907 10/15/21  0907   NA  --   --   --  146*  --  142  --  140 140   < >  --  137   POTASSIUM  --   --   --  3.8  --  4.1  --  4.8 4.2   < >  --  3.4   CHLORIDE  --   --   --  109  --  109  --  108  --   --   --  105   CO2  --   --   --  26  --  17*  --  21  --   --   --  25   ANIONGAP  --   --   --  11  --  16*  --  11  --   --   --  7   * 147* 133* 141*   < > 210*   < > 204* 151*   < >   < > 129*   BUN  --   --   --  20  --  17  --  14  --   --   --  14   CR  --   --   --  1.20  --  1.31*  --  0.93  --   --   --  0.65*   GFRESTIMATED  --   --   --  67  --  60*  --  >90  --   --   --  >90   ERIK  --   --   --  7.9*  --  7.9*  --  7.7*  --   --   --  9.0   MAG  --   --   --  2.2  --  2.5*  --  2.7*  --   --   --  1.9   PHOS  --   --   --  5.3*  --  7.2*  --  7.7*  --   --   --  2.6   PROTTOTAL  --   --   --   --   --  4.4*  --  4.3*  --   --   --  6.3*   ALBUMIN  --   --   --   --   --  1.7*  --  1.7*  --   --   --  2.9*   BILITOTAL  --   --   --   --   --  1.5*  --  1.4*  --   --   --  0.4   ALKPHOS  --   --   --   --   --  62  --  59  --   --   --  68   AST  --   --   --   --   --  230*  --  123*  --   --   --  16   ALT  --   --   --   --   --  212*  --  38  --   --   --  30    < > = values in this interval not displayed.     CBC:   Recent Labs   Lab 10/17/21  0346 10/16/21  2032 10/16/21  1535 10/16/21  1405 10/16/21  0746 10/15/21  0916   WBC 39.9* 69.5* 60.0*  --   --  22.9*   RBC 3.71* 4.06* 3.91*  --   --  3.72*   HGB 11.2* 12.2* 11.8* 12.3*   < > 11.1*   HCT 32.8* 37.8* 36.4*  --   --  34.3*   MCV 88 93 93  --   --  92   MCH 30.2 30.0 30.2  --   --  29.8   MCHC 34.1 32.3 32.4  --   --  32.4   RDW 16.6* 16.5* 16.8*  --   --  17.0*    250 238  --   --  224    < > = values in this interval not displayed.       INR:   Recent Labs   Lab 10/17/21  0346 10/16/21  2032 10/16/21  1535 10/15/21  0907   INR 1.77* 1.54* 1.38* 1.02       Glucose:   Recent Labs   Lab  10/17/21  0645 10/17/21  0601 10/17/21  0458 10/17/21  0346 10/17/21  0341 10/17/21  0240   * 147* 133* 141* 127* 155*       Blood Gas:   Recent Labs   Lab 10/17/21  0607 10/17/21  0342 10/17/21  0103 10/16/21  2309 10/16/21  2309 10/16/21  2123 10/16/21  1953   PHV  --  7.47*  --   --  7.29*  --  7.19*   PCO2V  --  39*  --   --  46  --  51*   PO2V  --  25  --   --  30  --  31   HCO3V  --  28  --   --  22  --  19*   LORI  --  4.3*  --   --  -4.6  --  -9.0*   O2PER 65 65  65 70   < > 60  60   < > 60  60    < > = values in this interval not displayed.       Culture Data No results for input(s): CULT in the last 168 hours.    Virology Data: No results found for: INFLUA, FLUAH1, FLUAH3, MB5038, IFLUB, RSVA, RSVB, PIV1, PIV2, PIV3, HMPV, HRVS, ADVBE, ADVC    Historical CMV results (last 3 of prior testing):  No results found for: CMVQNT  No results found for: CMVLOG    Urine Studies    Recent Labs   Lab Test 10/15/21  1127 10/07/21  1819   URINEPH 5.5 5.5   NITRITE Negative Negative   LEUKEST Negative Negative   WBCU 1 1       Most Recent [facility] Pulmonary Function Testing (FVC/FEV1 only)  No results found for: 20002  No results found for: 20003  No results found for: 20015  No results found for: 20016    IMAGING    Recent Results (from the past 48 hour(s))   XR Chest 2 Views    Narrative    EXAM: XR CHEST 2 VW 10/15/2021 10:25 AM    HISTORY: pre op lung transplant.    COMPARISON: Radiograph of the chest dated 10/7/2021.    TECHNIQUE: Upright frontal and lateral views of the chest.    FINDINGS: Right-sided PICC with tip in the right atrium.  Trachea is midline. Cardiomediastinal silhouette is stable. No  cardiomegaly. Stable diffuse mixed interstitial opacities. No new  focal pulmonary opacities. No pleural effusions. No pneumothoraces.  Visualized portion of the upper abdomen is within normal limits. No  acute osseous abnormalities.      Impression    IMPRESSION: Stable diffuse mixed interstitial opacities  consistent  with known interstitial lung disease. No new focal pulmonary  opacities.    I have personally reviewed the examination and initial interpretation  and I agree with the findings.    DAVIN ANGUIANO MD         SYSTEM ID:  [id number]

## 2021-10-17 ENCOUNTER — APPOINTMENT (OUTPATIENT)
Dept: GENERAL RADIOLOGY | Facility: CLINIC | Age: 56
End: 2021-10-17
Attending: STUDENT IN AN ORGANIZED HEALTH CARE EDUCATION/TRAINING PROGRAM
Payer: COMMERCIAL

## 2021-10-17 ENCOUNTER — HEALTH MAINTENANCE LETTER (OUTPATIENT)
Age: 56
End: 2021-10-17

## 2021-10-17 PROBLEM — J84.9 ILD (INTERSTITIAL LUNG DISEASE) (H): Status: RESOLVED | Noted: 2021-09-05 | Resolved: 2021-10-17

## 2021-10-17 PROBLEM — K72.00 SHOCK LIVER: Status: ACTIVE | Noted: 2021-10-17

## 2021-10-17 PROBLEM — Z94.2 S/P LUNG TRANSPLANT (H): Chronic | Status: ACTIVE | Noted: 2021-10-16

## 2021-10-17 PROBLEM — N17.9 AKI (ACUTE KIDNEY INJURY) (H): Status: ACTIVE | Noted: 2021-10-17

## 2021-10-17 LAB
ALBUMIN SERPL-MCNC: 1.3 G/DL (ref 3.4–5)
ALBUMIN SERPL-MCNC: 1.6 G/DL (ref 3.4–5)
ALBUMIN SERPL-MCNC: 2 G/DL (ref 3.4–5)
ALBUMIN UR-MCNC: 20 MG/DL
ALBUMIN UR-MCNC: 70 MG/DL
ALP SERPL-CCNC: 52 U/L (ref 40–150)
ALP SERPL-CCNC: 57 U/L (ref 40–150)
ALP SERPL-CCNC: 60 U/L (ref 40–150)
ALT SERPL W P-5'-P-CCNC: 1185 U/L (ref 0–70)
ALT SERPL W P-5'-P-CCNC: 1550 U/L (ref 0–70)
ALT SERPL W P-5'-P-CCNC: 994 U/L (ref 0–70)
ANION GAP SERPL CALCULATED.3IONS-SCNC: 11 MMOL/L (ref 3–14)
ANION GAP SERPL CALCULATED.3IONS-SCNC: 6 MMOL/L (ref 3–14)
ANION GAP SERPL CALCULATED.3IONS-SCNC: 6 MMOL/L (ref 3–14)
APPEARANCE UR: ABNORMAL
APPEARANCE UR: ABNORMAL
APTT PPP: 41 SECONDS (ref 22–38)
AST SERPL W P-5'-P-CCNC: 1246 U/L (ref 0–45)
AST SERPL W P-5'-P-CCNC: 380 U/L (ref 0–45)
AST SERPL W P-5'-P-CCNC: 550 U/L (ref 0–45)
BASE EXCESS BLDA CALC-SCNC: 0 MMOL/L (ref -9–1.8)
BASE EXCESS BLDA CALC-SCNC: 1.5 MMOL/L (ref -9–1.8)
BASE EXCESS BLDA CALC-SCNC: 3.2 MMOL/L (ref -9–1.8)
BASE EXCESS BLDA CALC-SCNC: 3.4 MMOL/L (ref -9–1.8)
BASE EXCESS BLDA CALC-SCNC: 4.1 MMOL/L (ref -9–1.8)
BASE EXCESS BLDA CALC-SCNC: 4.2 MMOL/L (ref -9–1.8)
BASE EXCESS BLDV CALC-SCNC: 2 MMOL/L (ref -7.7–1.9)
BASE EXCESS BLDV CALC-SCNC: 2.5 MMOL/L (ref -7.7–1.9)
BASE EXCESS BLDV CALC-SCNC: 4.3 MMOL/L (ref -7.7–1.9)
BASE EXCESS BLDV CALC-SCNC: 5.8 MMOL/L (ref -7.7–1.9)
BASE EXCESS BLDV CALC-SCNC: 6.1 MMOL/L (ref -7.7–1.9)
BASOPHILS # BLD AUTO: 0.1 10E3/UL (ref 0–0.2)
BASOPHILS NFR BLD AUTO: 0 %
BILIRUB DIRECT SERPL-MCNC: 0.6 MG/DL (ref 0–0.2)
BILIRUB DIRECT SERPL-MCNC: 0.7 MG/DL (ref 0–0.2)
BILIRUB DIRECT SERPL-MCNC: 0.8 MG/DL (ref 0–0.2)
BILIRUB SERPL-MCNC: 0.9 MG/DL (ref 0.2–1.3)
BILIRUB SERPL-MCNC: 1.1 MG/DL (ref 0.2–1.3)
BILIRUB SERPL-MCNC: 1.1 MG/DL (ref 0.2–1.3)
BILIRUB UR QL STRIP: ABNORMAL
BILIRUB UR QL STRIP: NEGATIVE
BUN SERPL-MCNC: 20 MG/DL (ref 7–30)
BUN SERPL-MCNC: 25 MG/DL (ref 7–30)
BUN SERPL-MCNC: 27 MG/DL (ref 7–30)
CA-I BLD-MCNC: 4.2 MG/DL (ref 4.4–5.2)
CALCIUM SERPL-MCNC: 7.7 MG/DL (ref 8.5–10.1)
CALCIUM SERPL-MCNC: 7.9 MG/DL (ref 8.5–10.1)
CALCIUM SERPL-MCNC: 8.3 MG/DL (ref 8.5–10.1)
CHLORIDE BLD-SCNC: 107 MMOL/L (ref 94–109)
CHLORIDE BLD-SCNC: 109 MMOL/L (ref 94–109)
CHLORIDE BLD-SCNC: 111 MMOL/L (ref 94–109)
CO2 SERPL-SCNC: 26 MMOL/L (ref 20–32)
CO2 SERPL-SCNC: 29 MMOL/L (ref 20–32)
CO2 SERPL-SCNC: 31 MMOL/L (ref 20–32)
COLOR UR AUTO: YELLOW
COLOR UR AUTO: YELLOW
CREAT SERPL-MCNC: 1.2 MG/DL (ref 0.66–1.25)
CREAT SERPL-MCNC: 1.45 MG/DL (ref 0.66–1.25)
CREAT SERPL-MCNC: 1.5 MG/DL (ref 0.66–1.25)
EOSINOPHIL # BLD AUTO: 0 10E3/UL (ref 0–0.7)
EOSINOPHIL NFR BLD AUTO: 0 %
ERYTHROCYTE [DISTWIDTH] IN BLOOD BY AUTOMATED COUNT: 16.6 % (ref 10–15)
GFR SERPL CREATININE-BSD FRML MDRD: 51 ML/MIN/1.73M2
GFR SERPL CREATININE-BSD FRML MDRD: 53 ML/MIN/1.73M2
GFR SERPL CREATININE-BSD FRML MDRD: 67 ML/MIN/1.73M2
GLUCOSE BLD-MCNC: 141 MG/DL (ref 70–99)
GLUCOSE BLD-MCNC: 144 MG/DL (ref 70–99)
GLUCOSE BLD-MCNC: 188 MG/DL (ref 70–99)
GLUCOSE BLDC GLUCOMTR-MCNC: 110 MG/DL (ref 70–99)
GLUCOSE BLDC GLUCOMTR-MCNC: 120 MG/DL (ref 70–99)
GLUCOSE BLDC GLUCOMTR-MCNC: 127 MG/DL (ref 70–99)
GLUCOSE BLDC GLUCOMTR-MCNC: 130 MG/DL (ref 70–99)
GLUCOSE BLDC GLUCOMTR-MCNC: 130 MG/DL (ref 70–99)
GLUCOSE BLDC GLUCOMTR-MCNC: 133 MG/DL (ref 70–99)
GLUCOSE BLDC GLUCOMTR-MCNC: 138 MG/DL (ref 70–99)
GLUCOSE BLDC GLUCOMTR-MCNC: 138 MG/DL (ref 70–99)
GLUCOSE BLDC GLUCOMTR-MCNC: 144 MG/DL (ref 70–99)
GLUCOSE BLDC GLUCOMTR-MCNC: 147 MG/DL (ref 70–99)
GLUCOSE BLDC GLUCOMTR-MCNC: 148 MG/DL (ref 70–99)
GLUCOSE BLDC GLUCOMTR-MCNC: 152 MG/DL (ref 70–99)
GLUCOSE BLDC GLUCOMTR-MCNC: 155 MG/DL (ref 70–99)
GLUCOSE BLDC GLUCOMTR-MCNC: 167 MG/DL (ref 70–99)
GLUCOSE BLDC GLUCOMTR-MCNC: 170 MG/DL (ref 70–99)
GLUCOSE BLDC GLUCOMTR-MCNC: 173 MG/DL (ref 70–99)
GLUCOSE UR STRIP-MCNC: NEGATIVE MG/DL
GLUCOSE UR STRIP-MCNC: NEGATIVE MG/DL
GRAM STAIN RESULT: NORMAL
GRAM STAIN RESULT: NORMAL
HCO3 BLD-SCNC: 24 MMOL/L (ref 21–28)
HCO3 BLD-SCNC: 26 MMOL/L (ref 21–28)
HCO3 BLD-SCNC: 28 MMOL/L (ref 21–28)
HCO3 BLD-SCNC: 29 MMOL/L (ref 21–28)
HCO3 BLD-SCNC: 29 MMOL/L (ref 21–28)
HCO3 BLDV-SCNC: 26 MMOL/L (ref 21–28)
HCO3 BLDV-SCNC: 28 MMOL/L (ref 21–28)
HCO3 BLDV-SCNC: 28 MMOL/L (ref 21–28)
HCO3 BLDV-SCNC: 31 MMOL/L (ref 21–28)
HCO3 BLDV-SCNC: 32 MMOL/L (ref 21–28)
HCT VFR BLD AUTO: 32.8 % (ref 40–53)
HCV RNA SERPL NAA+PROBE-ACNC: NOT DETECTED IU/ML
HGB BLD-MCNC: 11.2 G/DL (ref 13.3–17.7)
HGB BLD-MCNC: 9.5 G/DL (ref 13.3–17.7)
HGB UR QL STRIP: ABNORMAL
HGB UR QL STRIP: ABNORMAL
HYALINE CASTS: 13 /LPF
HYALINE CASTS: 3 /LPF
IMM GRANULOCYTES # BLD: 1.3 10E3/UL
IMM GRANULOCYTES NFR BLD: 3 %
INR PPP: 1.75 (ref 0.85–1.15)
INR PPP: 1.77 (ref 0.85–1.15)
KETONES UR STRIP-MCNC: ABNORMAL MG/DL
KETONES UR STRIP-MCNC: NEGATIVE MG/DL
KOH PREPARATION: NORMAL
KOH PREPARATION: NORMAL
LACTATE SERPL-SCNC: 3.5 MMOL/L (ref 0.7–2)
LACTATE SERPL-SCNC: 4.3 MMOL/L (ref 0.7–2)
LACTATE SERPL-SCNC: 5.2 MMOL/L (ref 0.7–2)
LACTATE SERPL-SCNC: 5.5 MMOL/L (ref 0.7–2)
LACTATE SERPL-SCNC: 6.1 MMOL/L (ref 0.7–2)
LACTATE SERPL-SCNC: 7.2 MMOL/L (ref 0.7–2)
LACTATE SERPL-SCNC: 7.3 MMOL/L (ref 0.7–2)
LEUKOCYTE ESTERASE UR QL STRIP: ABNORMAL
LEUKOCYTE ESTERASE UR QL STRIP: NEGATIVE
LYMPHOCYTES # BLD AUTO: 1.8 10E3/UL (ref 0.8–5.3)
LYMPHOCYTES NFR BLD AUTO: 5 %
MAGNESIUM SERPL-MCNC: 2.2 MG/DL (ref 1.6–2.3)
MCH RBC QN AUTO: 30.2 PG (ref 26.5–33)
MCHC RBC AUTO-ENTMCNC: 34.1 G/DL (ref 31.5–36.5)
MCV RBC AUTO: 88 FL (ref 78–100)
MONOCYTES # BLD AUTO: 1.8 10E3/UL (ref 0–1.3)
MONOCYTES NFR BLD AUTO: 5 %
MUCOUS THREADS #/AREA URNS LPF: PRESENT /LPF
MUCOUS THREADS #/AREA URNS LPF: PRESENT /LPF
NEUTROPHILS # BLD AUTO: 34.8 10E3/UL (ref 1.6–8.3)
NEUTROPHILS NFR BLD AUTO: 87 %
NITRATE UR QL: NEGATIVE
NITRATE UR QL: NEGATIVE
NRBC # BLD AUTO: 0.1 10E3/UL
NRBC BLD AUTO-RTO: 0 /100
O2/TOTAL GAS SETTING VFR VENT: 100 %
O2/TOTAL GAS SETTING VFR VENT: 65 %
O2/TOTAL GAS SETTING VFR VENT: 70 %
OXYHGB MFR BLD: 96 % (ref 92–100)
OXYHGB MFR BLDV: 43 % (ref 70–75)
OXYHGB MFR BLDV: 44 % (ref 70–75)
OXYHGB MFR BLDV: 45 % (ref 70–75)
OXYHGB MFR BLDV: 47 % (ref 70–75)
OXYHGB MFR BLDV: 49 % (ref 70–75)
PCO2 BLD: 31 MM HG (ref 35–45)
PCO2 BLD: 37 MM HG (ref 35–45)
PCO2 BLD: 38 MM HG (ref 35–45)
PCO2 BLD: 42 MM HG (ref 35–45)
PCO2 BLD: 43 MM HG (ref 35–45)
PCO2 BLD: 47 MM HG (ref 35–45)
PCO2 BLDV: 39 MM HG (ref 40–50)
PCO2 BLDV: 39 MM HG (ref 40–50)
PCO2 BLDV: 46 MM HG (ref 40–50)
PCO2 BLDV: 50 MM HG (ref 40–50)
PCO2 BLDV: 52 MM HG (ref 40–50)
PH BLD: 7.4 [PH] (ref 7.35–7.45)
PH BLD: 7.42 [PH] (ref 7.35–7.45)
PH BLD: 7.45 [PH] (ref 7.35–7.45)
PH BLD: 7.48 [PH] (ref 7.35–7.45)
PH BLD: 7.48 [PH] (ref 7.35–7.45)
PH BLD: 7.5 [PH] (ref 7.35–7.45)
PH BLD: 7.54 [PH] (ref 7.35–7.45)
PH BLD: 7.56 [PH] (ref 7.35–7.45)
PH BLDV: 7.37 [PH] (ref 7.32–7.43)
PH BLDV: 7.4 [PH] (ref 7.32–7.43)
PH BLDV: 7.44 [PH] (ref 7.32–7.43)
PH BLDV: 7.44 [PH] (ref 7.32–7.43)
PH BLDV: 7.47 [PH] (ref 7.32–7.43)
PH UR STRIP: 5 [PH] (ref 5–7)
PH UR STRIP: 5 [PH] (ref 5–7)
PHOSPHATE SERPL-MCNC: 5.2 MG/DL (ref 2.5–4.5)
PHOSPHATE SERPL-MCNC: 5.3 MG/DL (ref 2.5–4.5)
PHOSPHATE SERPL-MCNC: 6.3 MG/DL (ref 2.5–4.5)
PLATELET # BLD AUTO: 186 10E3/UL (ref 150–450)
PO2 BLD: 101 MM HG (ref 80–105)
PO2 BLD: 108 MM HG (ref 80–105)
PO2 BLD: 44 MM HG (ref 80–105)
PO2 BLD: 62 MM HG (ref 80–105)
PO2 BLD: 65 MM HG (ref 80–105)
PO2 BLD: 87 MM HG (ref 80–105)
PO2 BLD: 92 MM HG (ref 80–105)
PO2 BLD: 92 MM HG (ref 80–105)
PO2 BLDV: 25 MM HG (ref 25–47)
PO2 BLDV: 29 MM HG (ref 25–47)
PO2 BLDV: 29 MM HG (ref 25–47)
PO2 BLDV: 30 MM HG (ref 25–47)
PO2 BLDV: 30 MM HG (ref 25–47)
POTASSIUM BLD-SCNC: 3.7 MMOL/L (ref 3.4–5.3)
POTASSIUM BLD-SCNC: 3.8 MMOL/L (ref 3.4–5.3)
POTASSIUM BLD-SCNC: 4.3 MMOL/L (ref 3.4–5.3)
PROT SERPL-MCNC: 3.7 G/DL (ref 6.8–8.8)
PROT SERPL-MCNC: 4.1 G/DL (ref 6.8–8.8)
PROT SERPL-MCNC: 4.5 G/DL (ref 6.8–8.8)
RBC # BLD AUTO: 3.71 10E6/UL (ref 4.4–5.9)
RBC URINE: 63 /HPF
RBC URINE: >182 /HPF
SARS-COV-2 RNA RESP QL NAA+PROBE: NEGATIVE
SODIUM SERPL-SCNC: 144 MMOL/L (ref 133–144)
SODIUM SERPL-SCNC: 146 MMOL/L (ref 133–144)
SODIUM SERPL-SCNC: 146 MMOL/L (ref 133–144)
SP GR UR STRIP: 1.03 (ref 1–1.03)
SP GR UR STRIP: 1.04 (ref 1–1.03)
SQUAMOUS EPITHELIAL: 4 /HPF
SQUAMOUS EPITHELIAL: 4 /HPF
TACROLIMUS BLD-MCNC: <3 UG/L (ref 5–15)
TME LAST DOSE: ABNORMAL H
TME LAST DOSE: ABNORMAL H
TRANSITIONAL EPI: 1 /HPF
UROBILINOGEN UR STRIP-MCNC: 2 MG/DL
UROBILINOGEN UR STRIP-MCNC: NORMAL MG/DL
WBC # BLD AUTO: 39.9 10E3/UL (ref 4–11)
WBC URINE: 25 /HPF
WBC URINE: 44 /HPF

## 2021-10-17 PROCEDURE — 86833 HLA CLASS II HIGH DEFIN QUAL: CPT | Performed by: INTERNAL MEDICINE

## 2021-10-17 PROCEDURE — 81001 URINALYSIS AUTO W/SCOPE: CPT | Performed by: ANESTHESIOLOGY

## 2021-10-17 PROCEDURE — 82805 BLOOD GASES W/O2 SATURATION: CPT | Performed by: ANESTHESIOLOGY

## 2021-10-17 PROCEDURE — 82805 BLOOD GASES W/O2 SATURATION: CPT | Performed by: INTERNAL MEDICINE

## 2021-10-17 PROCEDURE — 999N000015 HC STATISTIC ARTERIAL MONITORING DAILY

## 2021-10-17 PROCEDURE — 87070 CULTURE OTHR SPECIMN AEROBIC: CPT | Performed by: INTERNAL MEDICINE

## 2021-10-17 PROCEDURE — 85610 PROTHROMBIN TIME: CPT | Performed by: SURGERY

## 2021-10-17 PROCEDURE — 80197 ASSAY OF TACROLIMUS: CPT | Performed by: STUDENT IN AN ORGANIZED HEALTH CARE EDUCATION/TRAINING PROGRAM

## 2021-10-17 PROCEDURE — 250N000013 HC RX MED GY IP 250 OP 250 PS 637: Performed by: NURSE PRACTITIONER

## 2021-10-17 PROCEDURE — 83605 ASSAY OF LACTIC ACID: CPT | Performed by: ANESTHESIOLOGY

## 2021-10-17 PROCEDURE — 250N000011 HC RX IP 250 OP 636: Performed by: NURSE PRACTITIONER

## 2021-10-17 PROCEDURE — 99255 IP/OBS CONSLTJ NEW/EST HI 80: CPT | Mod: 24 | Performed by: INTERNAL MEDICINE

## 2021-10-17 PROCEDURE — 250N000009 HC RX 250: Performed by: STUDENT IN AN ORGANIZED HEALTH CARE EDUCATION/TRAINING PROGRAM

## 2021-10-17 PROCEDURE — 87086 URINE CULTURE/COLONY COUNT: CPT | Performed by: ANESTHESIOLOGY

## 2021-10-17 PROCEDURE — C9113 INJ PANTOPRAZOLE SODIUM, VIA: HCPCS | Performed by: NURSE PRACTITIONER

## 2021-10-17 PROCEDURE — 250N000012 HC RX MED GY IP 250 OP 636 PS 637: Performed by: NURSE PRACTITIONER

## 2021-10-17 PROCEDURE — 71045 X-RAY EXAM CHEST 1 VIEW: CPT | Mod: 26 | Performed by: RADIOLOGY

## 2021-10-17 PROCEDURE — 250N000009 HC RX 250: Performed by: ANESTHESIOLOGY

## 2021-10-17 PROCEDURE — 250N000013 HC RX MED GY IP 250 OP 250 PS 637: Performed by: STUDENT IN AN ORGANIZED HEALTH CARE EDUCATION/TRAINING PROGRAM

## 2021-10-17 PROCEDURE — 94799 UNLISTED PULMONARY SVC/PX: CPT

## 2021-10-17 PROCEDURE — 250N000011 HC RX IP 250 OP 636: Performed by: THORACIC SURGERY (CARDIOTHORACIC VASCULAR SURGERY)

## 2021-10-17 PROCEDURE — 86832 HLA CLASS I HIGH DEFIN QUAL: CPT | Performed by: INTERNAL MEDICINE

## 2021-10-17 PROCEDURE — 85018 HEMOGLOBIN: CPT | Performed by: ANESTHESIOLOGY

## 2021-10-17 PROCEDURE — 200N000002 HC R&B ICU UMMC

## 2021-10-17 PROCEDURE — 87449 NOS EACH ORGANISM AG IA: CPT | Performed by: NURSE PRACTITIONER

## 2021-10-17 PROCEDURE — 84100 ASSAY OF PHOSPHORUS: CPT | Performed by: THORACIC SURGERY (CARDIOTHORACIC VASCULAR SURGERY)

## 2021-10-17 PROCEDURE — 85025 COMPLETE CBC W/AUTO DIFF WBC: CPT | Performed by: STUDENT IN AN ORGANIZED HEALTH CARE EDUCATION/TRAINING PROGRAM

## 2021-10-17 PROCEDURE — P9041 ALBUMIN (HUMAN),5%, 50ML: HCPCS | Performed by: SURGERY

## 2021-10-17 PROCEDURE — 87106 FUNGI IDENTIFICATION YEAST: CPT | Performed by: INTERNAL MEDICINE

## 2021-10-17 PROCEDURE — 94640 AIRWAY INHALATION TREATMENT: CPT

## 2021-10-17 PROCEDURE — 87205 SMEAR GRAM STAIN: CPT | Performed by: ANESTHESIOLOGY

## 2021-10-17 PROCEDURE — 87040 BLOOD CULTURE FOR BACTERIA: CPT | Performed by: ANESTHESIOLOGY

## 2021-10-17 PROCEDURE — 250N000012 HC RX MED GY IP 250 OP 636 PS 637: Performed by: STUDENT IN AN ORGANIZED HEALTH CARE EDUCATION/TRAINING PROGRAM

## 2021-10-17 PROCEDURE — 87633 RESP VIRUS 12-25 TARGETS: CPT | Performed by: INTERNAL MEDICINE

## 2021-10-17 PROCEDURE — 250N000011 HC RX IP 250 OP 636: Performed by: ANESTHESIOLOGY

## 2021-10-17 PROCEDURE — 84450 TRANSFERASE (AST) (SGOT): CPT | Performed by: INTERNAL MEDICINE

## 2021-10-17 PROCEDURE — 87210 SMEAR WET MOUNT SALINE/INK: CPT | Performed by: INTERNAL MEDICINE

## 2021-10-17 PROCEDURE — 71045 X-RAY EXAM CHEST 1 VIEW: CPT | Mod: 76

## 2021-10-17 PROCEDURE — 31622 DX BRONCHOSCOPE/WASH: CPT

## 2021-10-17 PROCEDURE — 99291 CRITICAL CARE FIRST HOUR: CPT | Mod: 24 | Performed by: INTERNAL MEDICINE

## 2021-10-17 PROCEDURE — 258N000003 HC RX IP 258 OP 636: Performed by: ANESTHESIOLOGY

## 2021-10-17 PROCEDURE — 250N000009 HC RX 250: Performed by: SURGERY

## 2021-10-17 PROCEDURE — 87116 MYCOBACTERIA CULTURE: CPT | Performed by: INTERNAL MEDICINE

## 2021-10-17 PROCEDURE — 87205 SMEAR GRAM STAIN: CPT | Performed by: INTERNAL MEDICINE

## 2021-10-17 PROCEDURE — 71045 X-RAY EXAM CHEST 1 VIEW: CPT

## 2021-10-17 PROCEDURE — 83735 ASSAY OF MAGNESIUM: CPT | Performed by: STUDENT IN AN ORGANIZED HEALTH CARE EDUCATION/TRAINING PROGRAM

## 2021-10-17 PROCEDURE — 258N000003 HC RX IP 258 OP 636: Performed by: SURGERY

## 2021-10-17 PROCEDURE — 80053 COMPREHEN METABOLIC PANEL: CPT | Performed by: STUDENT IN AN ORGANIZED HEALTH CARE EDUCATION/TRAINING PROGRAM

## 2021-10-17 PROCEDURE — 87102 FUNGUS ISOLATION CULTURE: CPT | Performed by: INTERNAL MEDICINE

## 2021-10-17 PROCEDURE — 250N000011 HC RX IP 250 OP 636: Performed by: STUDENT IN AN ORGANIZED HEALTH CARE EDUCATION/TRAINING PROGRAM

## 2021-10-17 PROCEDURE — 94640 AIRWAY INHALATION TREATMENT: CPT | Mod: 76

## 2021-10-17 PROCEDURE — 82330 ASSAY OF CALCIUM: CPT | Performed by: SURGERY

## 2021-10-17 PROCEDURE — 999N000157 HC STATISTIC RCP TIME EA 10 MIN

## 2021-10-17 PROCEDURE — 94003 VENT MGMT INPAT SUBQ DAY: CPT

## 2021-10-17 PROCEDURE — 84155 ASSAY OF PROTEIN SERUM: CPT | Performed by: INTERNAL MEDICINE

## 2021-10-17 PROCEDURE — 250N000013 HC RX MED GY IP 250 OP 250 PS 637: Performed by: ANESTHESIOLOGY

## 2021-10-17 PROCEDURE — 82803 BLOOD GASES ANY COMBINATION: CPT | Performed by: SURGERY

## 2021-10-17 PROCEDURE — 3E043XZ INTRODUCTION OF VASOPRESSOR INTO CENTRAL VEIN, PERCUTANEOUS APPROACH: ICD-10-PCS | Performed by: ANESTHESIOLOGY

## 2021-10-17 PROCEDURE — 250N000011 HC RX IP 250 OP 636: Performed by: SURGERY

## 2021-10-17 PROCEDURE — 87529 HSV DNA AMP PROBE: CPT | Performed by: INTERNAL MEDICINE

## 2021-10-17 PROCEDURE — 84100 ASSAY OF PHOSPHORUS: CPT | Performed by: INTERNAL MEDICINE

## 2021-10-17 PROCEDURE — 83735 ASSAY OF MAGNESIUM: CPT | Performed by: INTERNAL MEDICINE

## 2021-10-17 PROCEDURE — 31645 BRNCHSC W/THER ASPIR 1ST: CPT | Performed by: INTERNAL MEDICINE

## 2021-10-17 PROCEDURE — U0005 INFEC AGEN DETEC AMPLI PROBE: HCPCS | Performed by: INTERNAL MEDICINE

## 2021-10-17 PROCEDURE — 250N000009 HC RX 250: Performed by: NURSE PRACTITIONER

## 2021-10-17 PROCEDURE — 83605 ASSAY OF LACTIC ACID: CPT | Performed by: SURGERY

## 2021-10-17 PROCEDURE — 82248 BILIRUBIN DIRECT: CPT | Performed by: NURSE PRACTITIONER

## 2021-10-17 PROCEDURE — 82803 BLOOD GASES ANY COMBINATION: CPT | Performed by: STUDENT IN AN ORGANIZED HEALTH CARE EDUCATION/TRAINING PROGRAM

## 2021-10-17 PROCEDURE — 999N000045 HC STATISTIC DAILY SWAN MONITORING

## 2021-10-17 PROCEDURE — 85730 THROMBOPLASTIN TIME PARTIAL: CPT | Performed by: SURGERY

## 2021-10-17 PROCEDURE — 82803 BLOOD GASES ANY COMBINATION: CPT | Performed by: INTERNAL MEDICINE

## 2021-10-17 PROCEDURE — 83735 ASSAY OF MAGNESIUM: CPT | Performed by: THORACIC SURGERY (CARDIOTHORACIC VASCULAR SURGERY)

## 2021-10-17 PROCEDURE — 31622 DX BRONCHOSCOPE/WASH: CPT | Performed by: INTERNAL MEDICINE

## 2021-10-17 PROCEDURE — 999N000155 HC STATISTIC RAPCV CVP MONITORING

## 2021-10-17 PROCEDURE — 85610 PROTHROMBIN TIME: CPT | Performed by: INTERNAL MEDICINE

## 2021-10-17 PROCEDURE — 87206 SMEAR FLUORESCENT/ACID STAI: CPT | Performed by: INTERNAL MEDICINE

## 2021-10-17 PROCEDURE — 94668 MNPJ CHEST WALL SBSQ: CPT

## 2021-10-17 PROCEDURE — 80069 RENAL FUNCTION PANEL: CPT | Performed by: STUDENT IN AN ORGANIZED HEALTH CARE EDUCATION/TRAINING PROGRAM

## 2021-10-17 RX ORDER — FUROSEMIDE 10 MG/ML
20 INJECTION INTRAMUSCULAR; INTRAVENOUS ONCE
Status: COMPLETED | OUTPATIENT
Start: 2021-10-17 | End: 2021-10-17

## 2021-10-17 RX ORDER — ALBUMIN, HUMAN INJ 5% 5 %
SOLUTION INTRAVENOUS
Status: DISCONTINUED
Start: 2021-10-17 | End: 2021-10-17 | Stop reason: HOSPADM

## 2021-10-17 RX ORDER — LEVOTHYROXINE SODIUM 25 UG/1
25 TABLET ORAL DAILY
Status: DISCONTINUED | OUTPATIENT
Start: 2021-10-17 | End: 2021-12-13 | Stop reason: HOSPADM

## 2021-10-17 RX ORDER — DEXTROSE MONOHYDRATE 100 MG/ML
INJECTION, SOLUTION INTRAVENOUS CONTINUOUS PRN
Status: DISCONTINUED | OUTPATIENT
Start: 2021-10-17 | End: 2021-12-13 | Stop reason: HOSPADM

## 2021-10-17 RX ORDER — ALBUMIN, HUMAN INJ 5% 5 %
500 SOLUTION INTRAVENOUS ONCE
Status: COMPLETED | OUTPATIENT
Start: 2021-10-17 | End: 2021-10-17

## 2021-10-17 RX ORDER — CEFTAZIDIME 2 G/1
2 INJECTION, POWDER, FOR SOLUTION INTRAVENOUS EVERY 12 HOURS
Status: COMPLETED | OUTPATIENT
Start: 2021-10-17 | End: 2021-10-18

## 2021-10-17 RX ORDER — POTASSIUM CHLORIDE 20MEQ/15ML
20 LIQUID (ML) ORAL DAILY
Status: DISCONTINUED | OUTPATIENT
Start: 2021-10-17 | End: 2021-11-02

## 2021-10-17 RX ORDER — POTASSIUM CHLORIDE 29.8 MG/ML
20 INJECTION INTRAVENOUS ONCE
Status: COMPLETED | OUTPATIENT
Start: 2021-10-18 | End: 2021-10-18

## 2021-10-17 RX ORDER — HEPARIN SODIUM 5000 [USP'U]/.5ML
5000 INJECTION, SOLUTION INTRAVENOUS; SUBCUTANEOUS EVERY 8 HOURS
Status: DISCONTINUED | OUTPATIENT
Start: 2021-10-17 | End: 2021-10-23

## 2021-10-17 RX ORDER — GUAIFENESIN 600 MG/1
15 TABLET, EXTENDED RELEASE ORAL DAILY
Status: DISCONTINUED | OUTPATIENT
Start: 2021-10-17 | End: 2021-12-05

## 2021-10-17 RX ADMIN — ACYCLOVIR 400 MG: 200 SUSPENSION ORAL at 20:09

## 2021-10-17 RX ADMIN — LEVOTHYROXINE SODIUM 25 MCG: 0.03 TABLET ORAL at 10:52

## 2021-10-17 RX ADMIN — ALBUMIN HUMAN 500 ML: 0.05 INJECTION, SOLUTION INTRAVENOUS at 04:49

## 2021-10-17 RX ADMIN — TACROLIMUS 1 MG: 0.5 CAPSULE ORAL at 18:24

## 2021-10-17 RX ADMIN — HEPARIN SODIUM 5000 UNITS: 10000 INJECTION, SOLUTION INTRAVENOUS; SUBCUTANEOUS at 13:21

## 2021-10-17 RX ADMIN — PROPOFOL 50 MCG/KG/MIN: 10 INJECTION, EMULSION INTRAVENOUS at 17:25

## 2021-10-17 RX ADMIN — ACETYLCYSTEINE 2 ML: 200 SOLUTION ORAL; RESPIRATORY (INHALATION) at 16:04

## 2021-10-17 RX ADMIN — ACETAMINOPHEN 975 MG: 325 TABLET, FILM COATED ORAL at 00:32

## 2021-10-17 RX ADMIN — SODIUM THIOSULFATE 0.25 MCG/KG/MIN: 250 INJECTION, SOLUTION INTRAVENOUS at 10:44

## 2021-10-17 RX ADMIN — ACETYLCYSTEINE 2 ML: 200 SOLUTION ORAL; RESPIRATORY (INHALATION) at 08:11

## 2021-10-17 RX ADMIN — CEFTAZIDIME 2 G: 2 INJECTION, POWDER, FOR SOLUTION INTRAVENOUS at 23:32

## 2021-10-17 RX ADMIN — NYSTATIN 1000000 UNITS: 500000 SUSPENSION ORAL at 20:10

## 2021-10-17 RX ADMIN — LEVALBUTEROL HYDROCHLORIDE 1.25 MG: 1.25 SOLUTION RESPIRATORY (INHALATION) at 16:04

## 2021-10-17 RX ADMIN — VANCOMYCIN HYDROCHLORIDE 1000 MG: 1 INJECTION, SOLUTION INTRAVENOUS at 05:56

## 2021-10-17 RX ADMIN — NYSTATIN 1000000 UNITS: 500000 SUSPENSION ORAL at 13:22

## 2021-10-17 RX ADMIN — PANTOPRAZOLE SODIUM 40 MG: 40 INJECTION, POWDER, FOR SOLUTION INTRAVENOUS at 20:09

## 2021-10-17 RX ADMIN — ACETAMINOPHEN 975 MG: 325 TABLET, FILM COATED ORAL at 16:36

## 2021-10-17 RX ADMIN — HUMAN INSULIN 5 UNITS/HR: 100 INJECTION, SOLUTION SUBCUTANEOUS at 14:45

## 2021-10-17 RX ADMIN — ACETYLCYSTEINE 2 ML: 200 SOLUTION ORAL; RESPIRATORY (INHALATION) at 12:27

## 2021-10-17 RX ADMIN — GABAPENTIN 100 MG: 250 SUSPENSION ORAL at 07:28

## 2021-10-17 RX ADMIN — METHYLPREDNISOLONE SODIUM SUCCINATE 125 MG: 125 INJECTION, POWDER, FOR SOLUTION INTRAMUSCULAR; INTRAVENOUS at 13:22

## 2021-10-17 RX ADMIN — PREDNISOLONE 17.5 MG: 15 SOLUTION ORAL at 20:09

## 2021-10-17 RX ADMIN — METHOCARBAMOL 500 MG: 500 TABLET ORAL at 07:26

## 2021-10-17 RX ADMIN — MYCOPHENOLATE MOFETIL 1500 MG: 200 POWDER, FOR SUSPENSION ORAL at 07:27

## 2021-10-17 RX ADMIN — POTASSIUM CHLORIDE 20 MEQ: 40 SOLUTION ORAL at 08:38

## 2021-10-17 RX ADMIN — METHYLPREDNISOLONE SODIUM SUCCINATE 125 MG: 125 INJECTION, POWDER, FOR SOLUTION INTRAMUSCULAR; INTRAVENOUS at 04:39

## 2021-10-17 RX ADMIN — FUROSEMIDE 20 MG: 10 INJECTION, SOLUTION INTRAVENOUS at 13:22

## 2021-10-17 RX ADMIN — CEFTAZIDIME 2 G: 2 INJECTION, POWDER, FOR SOLUTION INTRAVENOUS at 04:39

## 2021-10-17 RX ADMIN — HEPARIN SODIUM 5000 UNITS: 10000 INJECTION, SOLUTION INTRAVENOUS; SUBCUTANEOUS at 20:09

## 2021-10-17 RX ADMIN — MULTIVIT AND MINERALS-FERROUS GLUCONATE 9 MG IRON/15 ML ORAL LIQUID 15 ML: at 10:52

## 2021-10-17 RX ADMIN — GABAPENTIN 100 MG: 250 SUSPENSION ORAL at 20:09

## 2021-10-17 RX ADMIN — EPINEPHRINE 0.07 MCG/KG/MIN: 1 INJECTION PARENTERAL at 14:51

## 2021-10-17 RX ADMIN — SODIUM CHLORIDE, POTASSIUM CHLORIDE, SODIUM LACTATE AND CALCIUM CHLORIDE 500 ML: 600; 310; 30; 20 INJECTION, SOLUTION INTRAVENOUS at 03:24

## 2021-10-17 RX ADMIN — NYSTATIN 1000000 UNITS: 500000 SUSPENSION ORAL at 07:26

## 2021-10-17 RX ADMIN — MYCOPHENOLATE MOFETIL 1500 MG: 200 POWDER, FOR SUSPENSION ORAL at 20:09

## 2021-10-17 RX ADMIN — DOCUSATE SODIUM 50 MG AND SENNOSIDES 8.6 MG 1 TABLET: 8.6; 5 TABLET, FILM COATED ORAL at 20:10

## 2021-10-17 RX ADMIN — TACROLIMUS 1 MG: 0.5 CAPSULE ORAL at 08:38

## 2021-10-17 RX ADMIN — LEVALBUTEROL HYDROCHLORIDE 1.25 MG: 1.25 SOLUTION RESPIRATORY (INHALATION) at 08:11

## 2021-10-17 RX ADMIN — ACETYLCYSTEINE 2 ML: 200 SOLUTION ORAL; RESPIRATORY (INHALATION) at 20:33

## 2021-10-17 RX ADMIN — NYSTATIN 1000000 UNITS: 500000 SUSPENSION ORAL at 16:36

## 2021-10-17 RX ADMIN — PANTOPRAZOLE SODIUM 40 MG: 40 INJECTION, POWDER, FOR SOLUTION INTRAVENOUS at 07:26

## 2021-10-17 RX ADMIN — PROPOFOL 25 MCG/KG/MIN: 10 INJECTION, EMULSION INTRAVENOUS at 08:22

## 2021-10-17 RX ADMIN — POLYETHYLENE GLYCOL 3350 17 G: 17 POWDER, FOR SOLUTION ORAL at 07:26

## 2021-10-17 RX ADMIN — GABAPENTIN 100 MG: 250 SUSPENSION ORAL at 13:53

## 2021-10-17 RX ADMIN — DOCUSATE SODIUM 50 MG AND SENNOSIDES 8.6 MG 1 TABLET: 8.6; 5 TABLET, FILM COATED ORAL at 07:26

## 2021-10-17 RX ADMIN — ACYCLOVIR 400 MG: 200 SUSPENSION ORAL at 07:27

## 2021-10-17 RX ADMIN — ACETAMINOPHEN 975 MG: 325 TABLET, FILM COATED ORAL at 07:25

## 2021-10-17 RX ADMIN — LEVALBUTEROL HYDROCHLORIDE 1.25 MG: 1.25 SOLUTION RESPIRATORY (INHALATION) at 20:33

## 2021-10-17 RX ADMIN — PROPOFOL 50 MCG/KG/MIN: 10 INJECTION, EMULSION INTRAVENOUS at 13:44

## 2021-10-17 RX ADMIN — LEVALBUTEROL HYDROCHLORIDE 1.25 MG: 1.25 SOLUTION RESPIRATORY (INHALATION) at 12:27

## 2021-10-17 RX ADMIN — CEFTAZIDIME 2 G: 2 INJECTION, POWDER, FOR SOLUTION INTRAVENOUS at 11:20

## 2021-10-17 RX ADMIN — ACETAMINOPHEN 975 MG: 325 TABLET, FILM COATED ORAL at 23:47

## 2021-10-17 RX ADMIN — PROPOFOL 40 MCG/KG/MIN: 10 INJECTION, EMULSION INTRAVENOUS at 22:31

## 2021-10-17 RX ADMIN — CALCIUM GLUCONATE 2 G: 20 INJECTION, SOLUTION INTRAVENOUS at 06:37

## 2021-10-17 ASSESSMENT — ACTIVITIES OF DAILY LIVING (ADL)
ADLS_ACUITY_SCORE: 16
ADLS_ACUITY_SCORE: 8
ADLS_ACUITY_SCORE: 16

## 2021-10-17 ASSESSMENT — MIFFLIN-ST. JEOR: SCORE: 1471

## 2021-10-17 NOTE — PROGRESS NOTES
"Bronchoscopy Risk Assessment Guidelines      A. Patient symptoms to consider when assessing pulmonary TB risk are:    I. Cough greater than 3 weeks; and fever, hemoptysis, pleuritic chest    pain, weight loss greater than 10 lbs, night sweats, fatigue, infiltrates on    upper lobes or superior segments of lower lobes, cavitation on chest    x-ray.   B. Patient risk factors to consider when assessing pulmonary TB risk are:    I. Exposure to known TB case, foreign-born persons (within 5 years of    arrival to [place]), residence in a crowded setting (correctional facility,     long-term care center, etc.), persons with HIV or immunosuppression.    Patients with symptoms and risk factors should generally be considered \"suspect risk\" and bronchoscopies should be performed in airborne precautions.    This patient has NO KNOWN RISK of Tuberculosis (proceed with bronchoscopy)    Specimens sent: yes  Complications: None  Scope used: #[id number] Slim  Attending Physician: MICAELA Grier RT on 10/17/2021 at 1:30 PM  "

## 2021-10-17 NOTE — PLAN OF CARE
Pt moves all extremities, opens eyes to repeated stimulation, does not follow commands. Prop @ 25 & Fent @ 25. Tmax 100.4. SR/ST 's. Epi @ 0.03. CVP 4, PA 30/20, CI 1.5, SVR 2200, SvO2 43%. Calcium 4.2 replaced. CMV, , RR 20, PEEP 5, FiO2 70%. Nitric @ 20. Lactate peaked @ 9. 2L LR & 500mL albumin given & Bicarb x5. Lactate now 6.1. OG to LIS. Decreased metzger output, now ~20mL/hr. 5 CTs to sxn w/ 500mL total output. Insulin gtt @ 5 units/hr. CVTS primary.

## 2021-10-17 NOTE — PLAN OF CARE
Pt unresponsive to stimulus, BIS score 40. Critical labs called to cvts. Repeat critical labs called to cvts. Provider acknowledged and aware. Pt's , ekg w/ ST and Provider was at bedside and aware. Provider notified of CI, SVR, Sv02 results. Piyush q4hrs. Pt turned and repositioned q2hr for comfort and skin integrity.

## 2021-10-17 NOTE — PROGRESS NOTES
"ADDENDUM:  New consult for: Registered Dietitian to order TF per Medical Nutrition Therapy Guidelines - Can start 20 ml/hr through OG before NJ placement    Interventions:  Ordered EN support: Osmolite @ 20 mL/hr via OGT  - Initiate @ 10 mL/hr and if tolerated, in 4 hours advance to 20 mL/hr. Do not advance beyond 20 mL/hr.  - 30 mL q4hr fluid flushes for tube patency. Additional fluids and/or adjustments per MD.    - Multivitamin/mineral (15 mL/day via FT) to help ensure micronutrient needs being met with suspected hypermetabolic demands and potential interruptions to TF infusions.  - HOB >30 degrees with gastric access    Lydia Gallardo RD, LD  Weekend Pager: [phone]    ------------------------------------------------------------------------------------    CLINICAL NUTRITION SERVICES - BRIEF NOTE     Nutrition Prescription    RECOMMENDATIONS FOR MDs/PROVIDERS TO ORDER:  1. If unable to extubate and advance diet within next 48 hours, recommend initiate nutrition support (recommend EN pending GI status and hemodynamic stability).   --> If/when nutrition support becomes POC, please consult RD (\"Registered Dietitian to Order TF per Medical Nutrition Therapy Guidelines\")    2. Would highly recommend EGD prior to any Cortrak post-pyloric FT placement to rule out duodenal ulcer. Pt's hiatal hernia already categorizes as HIGH RISK for Cortrak FT placement. It might be safest for GI vs Radiology to place.    Future/Additional Recommendations:  Once post-pyloric enteral access obtained, rec:  Osmolite 1.5 Conner @ goal of  60ml/hr (1440ml/day) + 1 packet prosurce  will provide: 2200 kcals (34 kca/kg), 101 g PRO (1.5 g/kg), 1097 ml free H20, 293 g CHO, and 0 g fiber daily.  - Initiate @ 10 mL/hr and advance by 10 mL q8hr as tolerated  - Do not start or advance unless K+ >/= 3.4, Mg++ >1.5, and phos >1.9   - 30 mL q4hr fluid flushes for tube patency. Additional fluids and/or adjustments per MD.    - Multivitamin/mineral (15 " "mL/day via FT) to help ensure micronutrient needs being met with suspected hypermetabolic demands and potential interruptions to TF infusions.     NEW FINDINGS   Pt is s/p bilateral lung transplant on 10/16. OGT (AXR). Remains intubated.    Pt with history of duodenal anomaly. Would be HIGH RISK Cortrak FT placement (hiatal hernia + concern for duodenal ulcer vs diverticula). See below for further detail.  Per Care Everywhere:    \"IMAGING:  CT ABDOMEN PELVIS WITH CONTRAST    Result Date: 7/19/2021  Patient Name: SHAYNA GUERRIER YOB: 1965 Procedure: CT ABDOMEN PELVIS WITH CONTRAST Date of Service: 07/19/2021 EXAM: CT ABDOMEN PELVIS WITH CONTRAST INDICATION:Nausea/vomiting, Epigastric pain, LUQ abdominal pain COMPARISON: None available. IMPRESSION: 1. 2.6 cm mixed gas and fluid collection in the pancreatic head which communicates with the descending duodenum. Associated adjacent small locule of gas, which may be extraluminal. Findings may represent a contained perforated duodenal ulcer versus duodenal diverticula. Consider EGD for further evaluation. 2. No additional acute findings in the abdomen or pelvis. 3. Colonic diverticulosis, without evidence of diverticulitis. 4. Exam performed in conjunction of CT of the chest. Please see separate report for findings and superior to the diaphragm. Cortical findings were discussed with Dr. Spence via telephone call at 5:19 PM on 7/19/2021. FINDINGS: Exam performed in conjunction with CT of the chest, which will be reported separately. The pancreatic head there is a peripherally enhancing 2.6 x 1.4 cm mixed gas and fluid collection (series 2, image 68), which communicates with the descending duodenum (coronal image 61). Adjacent to the descending duodenum is a small locule of gas, which may be extraluminal. Findings may represent a contained perforated duodenal ulcer versus duodenal diverticula. No significant associated fat stranding. 6 mm " "hypoattenuating lesion in the medial right hepatic lobe may represent a benign cyst, but is too small to characterize (series 2, image 39). Small esophageal hiatal hernia. The spleen, gallbladder, adrenal glands, kidneys, and pancreas are negative. No urolithiasis. No hydronephrosis. The small and large bowel are normal in caliber. Colonic diverticulosis, without evidence of diverticulitis normal appendix. Urinary bladder and prostate are unremarkable. No free fluid or air. No lymphadenopathy in the abdomen or pelvis. No acute or destructive osseous findings. Mild degenerative changes of the thoracolumbar spine. Finalized by: Luis Segura MD on 7/19/2021 5:22 PM CDT Patient/Procedure Information: [facility]\"    --Per chart review, pt was supposed to follow-up outpatient with GI for EGD to evaluate the above duodenal anomaly, but never done.    Monitoring/Evaluation  Progress toward goals will be monitored and evaluated per protocol.    Lydia Gallardo RD, LD  Weekend Pager: [phone]    "

## 2021-10-17 NOTE — PHARMACY-VANCOMYCIN DOSING SERVICE
Pharmacy Empiric Dose Change Per Policy - vancomycin  Original Dose Ordered: 1000 mg IV q12 hours  Dose Changed To: intermittent dosing based on levels  This dose change was based on the pharmacist's assessment of this patient's age, weight, concurrent drug therapy, treatment goals, whether patient's creatinine clearance adequately indicates renal function (factoring in age, muscle mass, fluid and clinical status), and, if applicable, prior pharmacokinetic data.    Estimated Creatinine Clearance: 62.9 mL/min (based on SCr of 1.2 mg/dL).  Will continue to follow and modify dosage according to levels, organ function and clinical condition    Michael Davis, PharmD, BCCCP

## 2021-10-17 NOTE — PHARMACY-TRANSPLANT NOTE
Adult Lung Transplant Post Operative Note     56 year old male  s/p lung transplant on 10/16 for NSIP/ILD.  Planned immunosuppression regimen to include basiliximab (POD0 and POD4), tacrolimus (goal 8-12), mycophenolate, and steroids per protocol.  Opportunistic pathogen prophylaxis includes: acyclovir (POD 1-30 given recent HSV infection and treatment course) and nystatin. Trimethoprim/sulfamethoxazole will be started in the coming days.  Patient is not enrolled in medication study.    Patient with planned immunosuppression and prophylaxis as above.  Pharmacy will monitor for medication interactions and immunosuppression levels in conjunction with the team. Medication therapy needs for discharge planning will continue to be addressed throughout the current admission via multidisciplinary rounds and order review. Pharmacy will make recommendations as appropriate.    Mcihael Davis, PharmD, BCCCP

## 2021-10-17 NOTE — PROGRESS NOTES
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    OVERNIGHT EVENTS:  - Received 2 L LR, 500 ml albumin and 5 amp bicarb  - Having low urine output      TODAY'S PROGRESS:   - ECHO today  - Keep the patient sedated  - Lasix 20 mg x1 - CVP low, will watch for today  - Bronchoscopy today did not show much secretions, edema +    PLAN:  Neuro/ pain/ sedation:  Acute Postoperative pain  Anxiety  Depression  - Monitor neurological status. Notify the MD for any acute changes in exam.  - Pain: Fentanyl gtt. Tylenol, Robaxin, Gabapentin ramses. PRN oxycodone, IV dilaudid,  - Sedation: Propofol gtt  - All 4 twitches present on neuro monitor  - Goal RASS: 0 to -2  - Continue PTA Escitalopram     Pulmonary care:   Postoperative ventilation management  S/p B/l lung transplant  SALTY on home CPAP  - Ventilator Settings:CMV- 20/400/12/90  - Caroline- 20  - Bronchoscopy today did not show much secretions, edema +  - Levo-albuterol nebs and Mucomyst  - Chest Physiotherapy  - Daily CXR  - Titrate supplemental oxygen to maintain saturation above 92%.  - Pulmonary hygiene: Incentive spirometer every 15- 30 minutes when awake, flutter valve, C&DB        Cardiovascular:    S/p b/l lung transplant on 10/16/21 by Dr. Corral  HTN  Recent echo on 09/07/21 with LVEF of 60-65%. Pulmonary HTN.     - Cardiac Cath(9/8/21)-     Right sided filling pressures are mildly elevated.    Moderately elevated pulmonary artery hypertension.    Left sided filling pressures are normal.    Normal cardiac output level.    Normal coronary arteries with mild tortuosity     - Monitor hemodynamic status.   - Lactic Acid: 5.5  - Goal MAP >65  -  Pressors: Epi.Continue @0.03-0.05  - Nitroprusside gtt for high SVR  - Statin: none  - ASA: one  - PTA meds: Amlodipine withheld           GI care/ Nutrition:   Elevated LFTs  - Diet: NJ placement today. 20 ml/hr through OG can be started.  - PPI  - Continue bowel regimen: miralax, senna  - Trend LFTs    Renal/ Fluid Balance/ Electrolytes:   BL creat appears to be ~ 0.7   - Strict I/O, daily weights  - Avoid/limit nephrotoxins as able  - Replete lytes PRN per protocol  -   Endocrine:    Stress induced hyperglycemia  Hypothyroidism  DM  RA  Preop A1c -6.6  - Insulin gtt  - Goal BG <180 for optimal healing  - PTA meds: Solumedrol 125 mg q6, Synthroid 25 mcg  - Received 2 doses of Rituximab in 6/21  - Rheumatology consult, will reach out to them on Monday     ID/ Antibiotics:  Immunosuppression  Stress induced leukocytosis  - pre-op WBC -22.9  - To complete perioperative regimen- Ceftazidime, Vancomycin  - Nystatin, Acyclovir  - Tacrolimus  - Basiliximab on POD#4  - Methylpred followed by oral prednisone  - Continue to monitor fever curve, WBC and inflammatory markers as appropriate  - Pan cultures sent today- follow up  - (PTA Bactrim, Cefepime, Doxycycline)  -Azathioprine to be avoided after transplant given low TPMT based on recommendations of transplant pulmonology, however if used, then azathioprine to be used in a low-dose     Heme:     Acute blood loss anemia  Acute blood loss thrombocytopenia  EBL- 1000 cc  Intra-op blood products- 360 ml cell saver  Pre-op Hb- 11.1, Platelet- 224  No s/sx active bleeding  - CBC in AM  - ID as above     MSK/ Skin:  Sternotomy  Surgical Incision  - Sternal precautions  - Postoperative incision management per protocol  - PT/OT/CR     Prophylaxis:    - Mechanical prophylaxis for DVT: SCDs  - Chemical DVT prophylaxis: Heparin subcutaneous   - PPI for 30 days postoperatively     Lines/ tubes/ drains:  - Right PICC(pre-op)  - Right radial A line(10/16)  - Right CVC(10/16)  - Right  [place](10/16)  - ETT(10/16)  - Watson(10/16)  - Chest tubes- 4 pleural, 1 mediastinal(10/16)  - OG(10/16)     Disposition:  - CV ICU.      Patient seen, findings and plan discussed with CV ICU staff.     Eunice Gutiérrez MD  PGY-3  Anesthesia    ====================================    SUBJECTIVE:   Patient is sedated and intubated. The FiO2 needs go up and BP drops when people wakes up and is agitated.    OBJECTIVE:   1. VITAL SIGNS:   Temp:  [98.7  F (37.1  C)-100.6  F (38.1  C)] 99.5  F (37.5  C)  Pulse:  [] 90  Resp:  [20-24] 20  MAP:  [61 mmHg-107 mmHg] 86 mmHg  Arterial Line BP: ()/(10-85) 122/68  FiO2 (%):  [60 %-100 %] 70 %  SpO2:  [93 %-100 %] 98 %  Ventilation Mode: CMV/AC  (Continuous Mandatory Ventilation/ Assist Control)  FiO2 (%): 70 %  Rate Set (breaths/minute): (S) 20 breaths/min  Tidal Volume Set (mL): 450 mL  PEEP (cm H2O): 5 cmH2O  Oxygen Concentration (%): 65 %  Resp: 20      2. INTAKE/ OUTPUT:   I/O last 3 completed shifts:  In: 6800.44 [I.V.:5345.44; Other:360; NG/GT:95; IV Piggyback:1000]  Out: 3073 [Urine:1083; Emesis/NG output:250; Blood:1000; Chest Tube:740]    3. PHYSICAL EXAMINATION:   General: sedated and intubated  Neuro: can't be assessed  Resp: ventilator dependent  CV: RRR  Abdomen: Soft, Non-distended, Non-tender  Incisions: c/d/i  Extremities: warm and well perfused    4. INVESTIGATIONS:   Arterial Blood Gases   Recent Labs   Lab 10/17/21  0607 10/17/21  0342 10/17/21  0103 10/16/21  2309   PH 7.56* 7.54* 7.50* 7.34*   PCO2 31* 31* 31* 34*   PO2 44* 87 92 75*   HCO3 28 26 24 18*     Complete Blood Count   Recent Labs   Lab 10/17/21  0346 10/16/21  2032 10/16/21  1535 10/16/21  1405 10/16/21  0746 10/15/21  0916   WBC 39.9* 69.5* 60.0*  --   --  22.9*   HGB 11.2* 12.2* 11.8* 12.3*   < > 11.1*    250 238  --   --  224    < > = values in this interval not displayed.     Basic Metabolic Panel  Recent Labs   Lab 10/17/21  0645 10/17/21  0601 10/17/21  0458 10/17/21  0346  10/16/21  2109 10/16/21  2032 10/16/21  1538 10/16/21  1535 10/16/21  1405 10/15/21  1234 10/15/21  0907 10/15/21  0907   NA  --   --   --  146*  --  142  --  140 140   < >  --  137   POTASSIUM  --   --   --  3.8  --  4.1  --  4.8 4.2   < >  --  3.4   CHLORIDE  --   --   --  109  --  109  --  108  --   --   --  105   CO2  --   --   --  26  --  17*  --  21  --   --   --  25   BUN  --   --   --  20  --  17  --  14  --   --   --  14   CR  --   --   --  1.20  --  1.31*  --  0.93  --   --   --  0.65*   * 147* 133* 141*   < > 210*   < > 204* 151*   < >   < > 129*    < > = values in this interval not displayed.     Liver Function Tests  Recent Labs   Lab 10/17/21  0346 10/16/21  2032 10/16/21  1535 10/15/21  0907   AST  --  230* 123* 16   ALT  --  212* 38 30   ALKPHOS  --  62 59 68   BILITOTAL  --  1.5* 1.4* 0.4   ALBUMIN  --  1.7* 1.7* 2.9*   INR 1.77* 1.54* 1.38* 1.02     Pancreatic Enzymes  No lab results found in last 7 days.  Coagulation Profile  Recent Labs   Lab 10/17/21  0346 10/16/21  2032 10/16/21  1535 10/15/21  0907   INR 1.77* 1.54* 1.38* 1.02   PTT 41* 42* 51* 30         5. RADIOLOGY:   Recent Results (from the past 24 hour(s))   XR Chest Port 1 View    Narrative    EXAM: XR CHEST PORT 1 VIEW  10/16/2021 1:39 PM     HISTORY:  no       COMPARISON:  Chest x-ray 10/15/2021    FINDINGS:     Supine radiograph of the chest. Endotracheal tube tip projects over  the midthoracic trachea. Right internal jugular Kersey-Sintia catheter tip  projects over the main pulmonary artery. Mediastinal surgical clips.  Mediastinal drain in place. Bibasilar and biapical chest tubes in  place. Central radiopacity corresponding to endotracheal blocker.    Trachea is midline. Cardiac silhouette is ill-defined. Diffuse mixed  opacities throughout the mid to upper lung zones. No significant  pleural effusion. No appreciable pneumothorax.      Impression    IMPRESSION:     1. No radiopaque surgical instrument identified within  the  field-of-view. Focal radiopacity projecting over soft tissue of right  lateral chest wall likely external to the patient.  2. Post surgical chest with numerous support devices as described in  the report.  3. Mixed perihilar opacities suggestive of pulmonary edema and  atelectasis.    Findings in this case including focal radiopacity projecting over the  soft tissues of right lateral chest wall were communicated to the  operating team by Dr. Goldstein.    I have personally reviewed the examination and initial interpretation  and I agree with the findings.    SHANDRA CEVALLOS MD         SYSTEM ID:  [id number]   XR Chest Port 1 View    Narrative    EXAM: XR CHEST PORT 1 VIEW  10/16/2021 3:54 PM     HISTORY:  s/p bilateral lung txp       COMPARISON:  Earlier same day chest x-ray, 10/15/2021    FINDINGS: AP radiograph of the chest. Postoperative changes of lung  transplant. Endotracheal tube projecting over the midthoracic trachea  with the tip approximately 2.2 cm cephalad to the shiv. Right IJ  Edmonds-Sintia catheter with tip overlying the main pulmonary artery. Right  arm PICC with tip overlying the superior cavoatrial junction. Stable  positioning of bilateral apical and bibasilar chest tubes. Stable  mediastinal drain.    Stable cardiomediastinal silhouette. Improved aeration to bilateral  upper lung fields. New consolidative opacity in the left midlung field  with hazy right perihilar opacities. No appreciable pneumothorax or  significant pleural effusion. Visualized upper abdomen is  unremarkable. Subcutaneous emphysema about the left greater than right  chest wall. No acute osseous abnormality.      Impression    IMPRESSION:  1. Postoperative changes of bilateral lung transplant with likely  shifting atelectasis most pronounced in the left midlung.  2. Endotracheal tube with tip projecting 2.2 cm cephalad to the  shiv. Additional support devices are unchanged as detailed above.    I have personally reviewed the  examination and initial interpretation  and I agree with the findings.    ZHANNA DONG MD         SYSTEM ID:  [id number]   XR Abdomen Port 1 View    Narrative    EXAM: XR ABDOMEN PORT 1 VW  10/16/2021 4:08 PM     HISTORY:  Orogastric tube.       COMPARISON:  Earlier same day chest x-ray    FINDINGS: Supine radiograph of the abdomen.  Orogastric tube with tip  and side-port overlying the stomach. Nonspecific, nonobstructive bowel  gas pattern. No pneumatosis or portal venous gas. Partial  visualization of multiple bilateral chest tubes and mediastinal drain  which are completely evaluated on the comparison chest radiograph.  Rectal temperature probe. No acute osseous abnormality.      Impression    IMPRESSION: OG tube with tip and side port overlying the stomach. Non  obstructive bowel gas pattern.    I have personally reviewed the examination and initial interpretation  and I agree with the findings.    ZHANNA DONG MD         SYSTEM ID:  [id number]       =========================================

## 2021-10-17 NOTE — PROGRESS NOTES
Final Crossmatch   Final crossmatch results for UNOS ID [id number] and recipient sample date 10/15/2021  were both T cell allo and auto negative. B cell, allo and auto were noted to be positive. Dr. Osei informed Dr. Woods via three way phone call of positive crossmatch results.  DSA not noted.

## 2021-10-17 NOTE — PHARMACY-CONSULT NOTE
Pharmacy Tube Feeding Consult    Medication reviewed for administration by feeding tube and for potential food/drug interactions.    Recommendation: No changes are needed at this time.     Pharmacy will continue to follow as new medications are ordered.    Michael Davis, PharmD, BCCCP

## 2021-10-17 NOTE — PROCEDURES
Procedure:   Bronchoscopy        Indication:   Acute on chronic hypoxemic respiratory failure       Consent:   Obtained from Peg over the phone.        Pre-medication:   On Propofol and Fentanyl gtt.          Procedure Summary:   Time out was performed.   The scope inserted thru the ETT. Exam of trachea and bronchus of the right and left bronchial tree to the sub-segmental level revealed no endobronchial lesion. Right and left anastomosis appeared intact without signs of ischemia, necrosis or dehiscence. Mucous appeared pale and with areas of mild edema consistent with ischemic injury.  There were scant moderately thick secretions that were suctioned, predominantly from the lower lobes bilaterally.  No large airway mucous plugging was noted.      The patient tolerated the procedure well without undue discomfort, hypotension or arrhythmia. The procedure was performed in the ICU--and vital sign parameters were monitored.    Catarina:      R anastomosis:       BI:      RML:      RLL:      L anastomosis:      KARI:      LLL:          Complications:   No immediate complications.  Sample sent for cell count, cytology and microbiology.    Jody Sifuentes MD, MSCI

## 2021-10-17 NOTE — PLAN OF CARE
Pt currently heavily sedated on propofol and fentanyl for hemodynamic stability. Pt's sp02 drops into the low 80s requiring RT to manually ventilate pt via bagging when sedation is lower. Pt currently RASS of -4 and hemodynamically stable. Nipride gtt to keep MAP <70. Pt febrile w/ max temp of 101.6. Provider notified and cultures sent. Ice packs applied. Pt turned and repositioned q2hr for comfort and skin integrity. AMAIRANI q4hr.

## 2021-10-18 ENCOUNTER — APPOINTMENT (OUTPATIENT)
Dept: GENERAL RADIOLOGY | Facility: CLINIC | Age: 56
End: 2021-10-18
Attending: STUDENT IN AN ORGANIZED HEALTH CARE EDUCATION/TRAINING PROGRAM
Payer: COMMERCIAL

## 2021-10-18 ENCOUNTER — LAB REQUISITION (OUTPATIENT)
Dept: LAB | Facility: CLINIC | Age: 56
End: 2021-10-18
Payer: COMMERCIAL

## 2021-10-18 LAB
ALBUMIN SERPL-MCNC: 1.4 G/DL (ref 3.4–5)
ALBUMIN SERPL-MCNC: 1.5 G/DL (ref 3.4–5)
ALBUMIN SERPL-MCNC: 1.5 G/DL (ref 3.4–5)
ALBUMIN SERPL-MCNC: 1.7 G/DL (ref 3.4–5)
ALP SERPL-CCNC: 123 U/L (ref 40–150)
ALP SERPL-CCNC: 136 U/L (ref 40–150)
ALP SERPL-CCNC: 61 U/L (ref 40–150)
ALP SERPL-CCNC: 77 U/L (ref 40–150)
ALT SERPL W P-5'-P-CCNC: 1015 U/L (ref 0–70)
ALT SERPL W P-5'-P-CCNC: 1080 U/L (ref 0–70)
ALT SERPL W P-5'-P-CCNC: 1146 U/L (ref 0–70)
ALT SERPL W P-5'-P-CCNC: 994 U/L (ref 0–70)
ANION GAP SERPL CALCULATED.3IONS-SCNC: 6 MMOL/L (ref 3–14)
ANION GAP SERPL CALCULATED.3IONS-SCNC: 6 MMOL/L (ref 3–14)
ANION GAP SERPL CALCULATED.3IONS-SCNC: 7 MMOL/L (ref 3–14)
ANION GAP SERPL CALCULATED.3IONS-SCNC: 8 MMOL/L (ref 3–14)
AST SERPL W P-5'-P-CCNC: 339 U/L (ref 0–45)
AST SERPL W P-5'-P-CCNC: 341 U/L (ref 0–45)
AST SERPL W P-5'-P-CCNC: 363 U/L (ref 0–45)
AST SERPL W P-5'-P-CCNC: 377 U/L (ref 0–45)
ATRIAL RATE - MUSE: 101 BPM
ATRIAL RATE - MUSE: 129 BPM
BACTERIA SPEC CULT: NO GROWTH
BACTERIA UR CULT: NO GROWTH
BASE EXCESS BLDA CALC-SCNC: 2 MMOL/L (ref -9–1.8)
BASE EXCESS BLDA CALC-SCNC: 2.6 MMOL/L (ref -9–1.8)
BASE EXCESS BLDA CALC-SCNC: 3.1 MMOL/L (ref -9–1.8)
BASE EXCESS BLDA CALC-SCNC: 4.6 MMOL/L (ref -9–1.8)
BASE EXCESS BLDA CALC-SCNC: 4.9 MMOL/L (ref -9–1.8)
BASE EXCESS BLDA CALC-SCNC: 7.5 MMOL/L (ref -9–1.8)
BASE EXCESS BLDA CALC-SCNC: 8.1 MMOL/L (ref -9–1.8)
BASE EXCESS BLDV CALC-SCNC: 5.4 MMOL/L (ref -7.7–1.9)
BASE EXCESS BLDV CALC-SCNC: 5.7 MMOL/L (ref -7.7–1.9)
BASE EXCESS BLDV CALC-SCNC: 5.9 MMOL/L (ref -7.7–1.9)
BASE EXCESS BLDV CALC-SCNC: 6.5 MMOL/L (ref -7.7–1.9)
BASE EXCESS BLDV CALC-SCNC: 7.1 MMOL/L (ref -7.7–1.9)
BASE EXCESS BLDV CALC-SCNC: 8.2 MMOL/L (ref -7.7–1.9)
BASE EXCESS BLDV CALC-SCNC: 9.3 MMOL/L (ref -7.7–1.9)
BASOPHILS # BLD AUTO: 0.1 10E3/UL (ref 0–0.2)
BASOPHILS NFR BLD AUTO: 0 %
BILIRUB DIRECT SERPL-MCNC: 0.3 MG/DL (ref 0–0.2)
BILIRUB DIRECT SERPL-MCNC: 0.5 MG/DL (ref 0–0.2)
BILIRUB DIRECT SERPL-MCNC: 0.5 MG/DL (ref 0–0.2)
BILIRUB DIRECT SERPL-MCNC: 0.6 MG/DL (ref 0–0.2)
BILIRUB SERPL-MCNC: 0.6 MG/DL (ref 0.2–1.3)
BILIRUB SERPL-MCNC: 0.7 MG/DL (ref 0.2–1.3)
BILIRUB SERPL-MCNC: 0.7 MG/DL (ref 0.2–1.3)
BILIRUB SERPL-MCNC: 0.8 MG/DL (ref 0.2–1.3)
BUN SERPL-MCNC: 30 MG/DL (ref 7–30)
BUN SERPL-MCNC: 33 MG/DL (ref 7–30)
BUN SERPL-MCNC: 42 MG/DL (ref 7–30)
BUN SERPL-MCNC: 48 MG/DL (ref 7–30)
CALCIUM SERPL-MCNC: 7.7 MG/DL (ref 8.5–10.1)
CALCIUM SERPL-MCNC: 7.8 MG/DL (ref 8.5–10.1)
CALCIUM SERPL-MCNC: 8.2 MG/DL (ref 8.5–10.1)
CALCIUM SERPL-MCNC: 8.2 MG/DL (ref 8.5–10.1)
CHLORIDE BLD-SCNC: 107 MMOL/L (ref 94–109)
CHLORIDE BLD-SCNC: 108 MMOL/L (ref 94–109)
CHLORIDE BLD-SCNC: 108 MMOL/L (ref 94–109)
CHLORIDE BLD-SCNC: 109 MMOL/L (ref 94–109)
CO2 SERPL-SCNC: 28 MMOL/L (ref 20–32)
CO2 SERPL-SCNC: 29 MMOL/L (ref 20–32)
CO2 SERPL-SCNC: 29 MMOL/L (ref 20–32)
CO2 SERPL-SCNC: 30 MMOL/L (ref 20–32)
CREAT SERPL-MCNC: 1.54 MG/DL (ref 0.66–1.25)
CREAT SERPL-MCNC: 1.56 MG/DL (ref 0.66–1.25)
CREAT SERPL-MCNC: 1.68 MG/DL (ref 0.66–1.25)
CREAT SERPL-MCNC: 1.86 MG/DL (ref 0.66–1.25)
DIASTOLIC BLOOD PRESSURE - MUSE: NORMAL MMHG
DIASTOLIC BLOOD PRESSURE - MUSE: NORMAL MMHG
DONOR IDENTIFICATION: NORMAL
DONOR IDENTIFICATION: NORMAL
DSA COMMENTS: NORMAL
DSA COMMENTS: NORMAL
DSA PRESENT: NO
DSA PRESENT: NO
DSA TEST METHOD: NORMAL
DSA TEST METHOD: NORMAL
EOSINOPHIL # BLD AUTO: 0 10E3/UL (ref 0–0.7)
EOSINOPHIL NFR BLD AUTO: 0 %
ERYTHROCYTE [DISTWIDTH] IN BLOOD BY AUTOMATED COUNT: 17 % (ref 10–15)
GFR SERPL CREATININE-BSD FRML MDRD: 40 ML/MIN/1.73M2
GFR SERPL CREATININE-BSD FRML MDRD: 45 ML/MIN/1.73M2
GFR SERPL CREATININE-BSD FRML MDRD: 49 ML/MIN/1.73M2
GFR SERPL CREATININE-BSD FRML MDRD: 50 ML/MIN/1.73M2
GLUCOSE BLD-MCNC: 128 MG/DL (ref 70–99)
GLUCOSE BLD-MCNC: 147 MG/DL (ref 70–99)
GLUCOSE BLD-MCNC: 185 MG/DL (ref 70–99)
GLUCOSE BLD-MCNC: 228 MG/DL (ref 70–99)
GLUCOSE BLDC GLUCOMTR-MCNC: 115 MG/DL (ref 70–99)
GLUCOSE BLDC GLUCOMTR-MCNC: 126 MG/DL (ref 70–99)
GLUCOSE BLDC GLUCOMTR-MCNC: 130 MG/DL (ref 70–99)
GLUCOSE BLDC GLUCOMTR-MCNC: 131 MG/DL (ref 70–99)
GLUCOSE BLDC GLUCOMTR-MCNC: 136 MG/DL (ref 70–99)
GLUCOSE BLDC GLUCOMTR-MCNC: 140 MG/DL (ref 70–99)
GLUCOSE BLDC GLUCOMTR-MCNC: 151 MG/DL (ref 70–99)
GLUCOSE BLDC GLUCOMTR-MCNC: 152 MG/DL (ref 70–99)
GLUCOSE BLDC GLUCOMTR-MCNC: 160 MG/DL (ref 70–99)
GLUCOSE BLDC GLUCOMTR-MCNC: 166 MG/DL (ref 70–99)
GLUCOSE BLDC GLUCOMTR-MCNC: 174 MG/DL (ref 70–99)
GLUCOSE BLDC GLUCOMTR-MCNC: 174 MG/DL (ref 70–99)
GLUCOSE BLDC GLUCOMTR-MCNC: 178 MG/DL (ref 70–99)
GLUCOSE BLDC GLUCOMTR-MCNC: 226 MG/DL (ref 70–99)
GLUCOSE BLDC GLUCOMTR-MCNC: 230 MG/DL (ref 70–99)
GLUCOSE BLDC GLUCOMTR-MCNC: 231 MG/DL (ref 70–99)
GLUCOSE BLDC GLUCOMTR-MCNC: 84 MG/DL (ref 70–99)
HBV DNA SERPL QL NAA+PROBE: NORMAL
HCO3 BLD-SCNC: 26 MMOL/L (ref 21–28)
HCO3 BLD-SCNC: 27 MMOL/L (ref 21–28)
HCO3 BLD-SCNC: 29 MMOL/L (ref 21–28)
HCO3 BLD-SCNC: 32 MMOL/L (ref 21–28)
HCO3 BLD-SCNC: 33 MMOL/L (ref 21–28)
HCO3 BLDV-SCNC: 31 MMOL/L (ref 21–28)
HCO3 BLDV-SCNC: 32 MMOL/L (ref 21–28)
HCO3 BLDV-SCNC: 34 MMOL/L (ref 21–28)
HCO3 BLDV-SCNC: 34 MMOL/L (ref 21–28)
HCO3 BLDV-SCNC: 35 MMOL/L (ref 21–28)
HCT VFR BLD AUTO: 26.5 % (ref 40–53)
HCV RNA SERPL QL NAA+PROBE: NORMAL
HGB BLD-MCNC: 8.2 G/DL (ref 13.3–17.7)
HGB BLD-MCNC: 8.7 G/DL (ref 13.3–17.7)
HIV1+2 RNA SERPL QL NAA+PROBE: NORMAL
HSV1 DNA SPEC QL NAA+PROBE: NEGATIVE
HSV2 DNA SPEC QL NAA+PROBE: NEGATIVE
IMM GRANULOCYTES # BLD: 1.8 10E3/UL
IMM GRANULOCYTES NFR BLD: 4 %
INR PPP: 1.49 (ref 0.85–1.15)
INR PPP: 2.08 (ref 0.85–1.15)
INTERPRETATION ECG - MUSE: NORMAL
INTERPRETATION ECG - MUSE: NORMAL
LACTATE SERPL-SCNC: 1.7 MMOL/L (ref 0.7–2)
LACTATE SERPL-SCNC: 2.1 MMOL/L (ref 0.7–2)
LACTATE SERPL-SCNC: 2.5 MMOL/L (ref 0.7–2)
LACTATE SERPL-SCNC: 2.7 MMOL/L (ref 0.7–2)
LACTATE SERPL-SCNC: 3.5 MMOL/L (ref 0.7–2)
LACTATE SERPL-SCNC: 4.1 MMOL/L (ref 0.7–2)
LACTATE SERPL-SCNC: 4.2 MMOL/L (ref 0.7–2)
LYMPHOCYTES # BLD AUTO: 2.3 10E3/UL (ref 0.8–5.3)
LYMPHOCYTES NFR BLD AUTO: 5 %
MAGNESIUM SERPL-MCNC: 2.4 MG/DL (ref 1.6–2.3)
MAGNESIUM SERPL-MCNC: 2.5 MG/DL (ref 1.6–2.3)
MCH RBC QN AUTO: 29.7 PG (ref 26.5–33)
MCHC RBC AUTO-ENTMCNC: 32.8 G/DL (ref 31.5–36.5)
MCV RBC AUTO: 90 FL (ref 78–100)
MONOCYTES # BLD AUTO: 1.8 10E3/UL (ref 0–1.3)
MONOCYTES NFR BLD AUTO: 4 %
NEUTROPHILS # BLD AUTO: 38.5 10E3/UL (ref 1.6–8.3)
NEUTROPHILS NFR BLD AUTO: 87 %
NRBC # BLD AUTO: 0.1 10E3/UL
NRBC BLD AUTO-RTO: 0 /100
O2/TOTAL GAS SETTING VFR VENT: 100 %
O2/TOTAL GAS SETTING VFR VENT: 50 %
O2/TOTAL GAS SETTING VFR VENT: 50 %
O2/TOTAL GAS SETTING VFR VENT: 60 %
O2/TOTAL GAS SETTING VFR VENT: 70 %
O2/TOTAL GAS SETTING VFR VENT: 70 %
O2/TOTAL GAS SETTING VFR VENT: 80 %
ORGAN: NORMAL
ORGAN: NORMAL
OXYHGB MFR BLDV: 28 % (ref 70–75)
OXYHGB MFR BLDV: 42 % (ref 70–75)
OXYHGB MFR BLDV: 45 % (ref 70–75)
OXYHGB MFR BLDV: 49 % (ref 70–75)
P AXIS - MUSE: 50 DEGREES
P AXIS - MUSE: 71 DEGREES
PCO2 BLD: 35 MM HG (ref 35–45)
PCO2 BLD: 36 MM HG (ref 35–45)
PCO2 BLD: 41 MM HG (ref 35–45)
PCO2 BLD: 43 MM HG (ref 35–45)
PCO2 BLD: 44 MM HG (ref 35–45)
PCO2 BLD: 46 MM HG (ref 35–45)
PCO2 BLD: 58 MM HG (ref 35–45)
PCO2 BLDV: 45 MM HG (ref 40–50)
PCO2 BLDV: 47 MM HG (ref 40–50)
PCO2 BLDV: 50 MM HG (ref 40–50)
PCO2 BLDV: 51 MM HG (ref 40–50)
PCO2 BLDV: 52 MM HG (ref 40–50)
PCO2 BLDV: 53 MM HG (ref 40–50)
PCO2 BLDV: 58 MM HG (ref 40–50)
PH BLD: 7.31 [PH] (ref 7.35–7.45)
PH BLD: 7.43 [PH] (ref 7.35–7.45)
PH BLD: 7.45 [PH] (ref 7.35–7.45)
PH BLD: 7.46 [PH] (ref 7.35–7.45)
PH BLD: 7.47 [PH] (ref 7.35–7.45)
PH BLD: 7.49 [PH] (ref 7.35–7.45)
PH BLD: 7.49 [PH] (ref 7.35–7.45)
PH BLDV: 7.37 [PH] (ref 7.32–7.43)
PH BLDV: 7.38 [PH] (ref 7.32–7.43)
PH BLDV: 7.4 [PH] (ref 7.32–7.43)
PH BLDV: 7.42 [PH] (ref 7.32–7.43)
PH BLDV: 7.43 [PH] (ref 7.32–7.43)
PH BLDV: 7.44 [PH] (ref 7.32–7.43)
PH BLDV: 7.44 [PH] (ref 7.32–7.43)
PHOSPHATE SERPL-MCNC: 3.6 MG/DL (ref 2.5–4.5)
PHOSPHATE SERPL-MCNC: 4.3 MG/DL (ref 2.5–4.5)
PHOSPHATE SERPL-MCNC: 4.7 MG/DL (ref 2.5–4.5)
PLATELET # BLD AUTO: 211 10E3/UL (ref 150–450)
PO2 BLD: 116 MM HG (ref 80–105)
PO2 BLD: 119 MM HG (ref 80–105)
PO2 BLD: 122 MM HG (ref 80–105)
PO2 BLD: 138 MM HG (ref 80–105)
PO2 BLD: 65 MM HG (ref 80–105)
PO2 BLD: 89 MM HG (ref 80–105)
PO2 BLD: 97 MM HG (ref 80–105)
PO2 BLDV: 23 MM HG (ref 25–47)
PO2 BLDV: 28 MM HG (ref 25–47)
PO2 BLDV: 28 MM HG (ref 25–47)
PO2 BLDV: 30 MM HG (ref 25–47)
POTASSIUM BLD-SCNC: 3.7 MMOL/L (ref 3.4–5.3)
POTASSIUM BLD-SCNC: 3.9 MMOL/L (ref 3.4–5.3)
POTASSIUM BLD-SCNC: 4.1 MMOL/L (ref 3.4–5.3)
POTASSIUM BLD-SCNC: 4.1 MMOL/L (ref 3.4–5.3)
PR INTERVAL - MUSE: 130 MS
PR INTERVAL - MUSE: 138 MS
PROT SERPL-MCNC: 3.7 G/DL (ref 6.8–8.8)
PROT SERPL-MCNC: 4 G/DL (ref 6.8–8.8)
PROT SERPL-MCNC: 4.1 G/DL (ref 6.8–8.8)
PROT SERPL-MCNC: 4.2 G/DL (ref 6.8–8.8)
QRS DURATION - MUSE: 70 MS
QRS DURATION - MUSE: 76 MS
QT - MUSE: 298 MS
QT - MUSE: 312 MS
QTC - MUSE: 404 MS
QTC - MUSE: 436 MS
R AXIS - MUSE: 16 DEGREES
R AXIS - MUSE: 76 DEGREES
RBC # BLD AUTO: 2.93 10E6/UL (ref 4.4–5.9)
SA 1 CELL: NORMAL
SA 1 CELL: NORMAL
SA 1 TEST METHOD: NORMAL
SA 1 TEST METHOD: NORMAL
SA 2 CELL: NORMAL
SA 2 TEST METHOD: NORMAL
SA1 HI RISK ABY: NORMAL
SA1 HI RISK ABY: NORMAL
SA1 MOD RISK ABY: NORMAL
SA1 MOD RISK ABY: NORMAL
SA2 HI RISK ABY: NORMAL
SA2 MOD RISK ABY: NORMAL
SODIUM SERPL-SCNC: 143 MMOL/L (ref 133–144)
SODIUM SERPL-SCNC: 143 MMOL/L (ref 133–144)
SODIUM SERPL-SCNC: 144 MMOL/L (ref 133–144)
SODIUM SERPL-SCNC: 145 MMOL/L (ref 133–144)
SYSTOLIC BLOOD PRESSURE - MUSE: NORMAL MMHG
SYSTOLIC BLOOD PRESSURE - MUSE: NORMAL MMHG
T AXIS - MUSE: 26 DEGREES
T AXIS - MUSE: 59 DEGREES
TACROLIMUS BLD-MCNC: <3 UG/L (ref 5–15)
TME LAST DOSE: ABNORMAL H
TME LAST DOSE: ABNORMAL H
UNACCEPTABLE ANTIGENS: NORMAL
UNOS CPRA: 0
VANCOMYCIN SERPL-MCNC: 12.6 MG/L
VENTRICULAR RATE- MUSE: 101 BPM
VENTRICULAR RATE- MUSE: 129 BPM
WBC # BLD AUTO: 44.5 10E3/UL (ref 4–11)
ZZZSA 1  COMMENTS: NORMAL
ZZZSA 1  COMMENTS: NORMAL
ZZZSA 2 COMMENTS: NORMAL

## 2021-10-18 PROCEDURE — 999N000155 HC STATISTIC RAPCV CVP MONITORING

## 2021-10-18 PROCEDURE — 74340 X-RAY GUIDE FOR GI TUBE: CPT

## 2021-10-18 PROCEDURE — 999N000045 HC STATISTIC DAILY SWAN MONITORING

## 2021-10-18 PROCEDURE — 82803 BLOOD GASES ANY COMBINATION: CPT | Performed by: INTERNAL MEDICINE

## 2021-10-18 PROCEDURE — 85610 PROTHROMBIN TIME: CPT | Performed by: INTERNAL MEDICINE

## 2021-10-18 PROCEDURE — 85025 COMPLETE CBC W/AUTO DIFF WBC: CPT | Performed by: STUDENT IN AN ORGANIZED HEALTH CARE EDUCATION/TRAINING PROGRAM

## 2021-10-18 PROCEDURE — 94668 MNPJ CHEST WALL SBSQ: CPT

## 2021-10-18 PROCEDURE — 250N000009 HC RX 250: Performed by: THORACIC SURGERY (CARDIOTHORACIC VASCULAR SURGERY)

## 2021-10-18 PROCEDURE — 250N000011 HC RX IP 250 OP 636: Performed by: ANESTHESIOLOGY

## 2021-10-18 PROCEDURE — 44500 INTRO GASTROINTESTINAL TUBE: CPT

## 2021-10-18 PROCEDURE — 84100 ASSAY OF PHOSPHORUS: CPT | Performed by: INTERNAL MEDICINE

## 2021-10-18 PROCEDURE — 44500 INTRO GASTROINTESTINAL TUBE: CPT | Mod: GC | Performed by: STUDENT IN AN ORGANIZED HEALTH CARE EDUCATION/TRAINING PROGRAM

## 2021-10-18 PROCEDURE — 82805 BLOOD GASES W/O2 SATURATION: CPT | Performed by: INTERNAL MEDICINE

## 2021-10-18 PROCEDURE — 84450 TRANSFERASE (AST) (SGOT): CPT | Performed by: INTERNAL MEDICINE

## 2021-10-18 PROCEDURE — 93010 ELECTROCARDIOGRAM REPORT: CPT | Performed by: INTERNAL MEDICINE

## 2021-10-18 PROCEDURE — 83605 ASSAY OF LACTIC ACID: CPT | Performed by: ANESTHESIOLOGY

## 2021-10-18 PROCEDURE — 80069 RENAL FUNCTION PANEL: CPT | Performed by: INTERNAL MEDICINE

## 2021-10-18 PROCEDURE — 250N000011 HC RX IP 250 OP 636: Performed by: NURSE PRACTITIONER

## 2021-10-18 PROCEDURE — 80053 COMPREHEN METABOLIC PANEL: CPT | Performed by: INTERNAL MEDICINE

## 2021-10-18 PROCEDURE — 250N000013 HC RX MED GY IP 250 OP 250 PS 637: Performed by: ANESTHESIOLOGY

## 2021-10-18 PROCEDURE — 250N000009 HC RX 250: Performed by: STUDENT IN AN ORGANIZED HEALTH CARE EDUCATION/TRAINING PROGRAM

## 2021-10-18 PROCEDURE — 250N000013 HC RX MED GY IP 250 OP 250 PS 637: Performed by: STUDENT IN AN ORGANIZED HEALTH CARE EDUCATION/TRAINING PROGRAM

## 2021-10-18 PROCEDURE — 84100 ASSAY OF PHOSPHORUS: CPT | Performed by: THORACIC SURGERY (CARDIOTHORACIC VASCULAR SURGERY)

## 2021-10-18 PROCEDURE — 74340 X-RAY GUIDE FOR GI TUBE: CPT | Mod: 26 | Performed by: STUDENT IN AN ORGANIZED HEALTH CARE EDUCATION/TRAINING PROGRAM

## 2021-10-18 PROCEDURE — 93005 ELECTROCARDIOGRAM TRACING: CPT

## 2021-10-18 PROCEDURE — 82803 BLOOD GASES ANY COMBINATION: CPT | Performed by: STUDENT IN AN ORGANIZED HEALTH CARE EDUCATION/TRAINING PROGRAM

## 2021-10-18 PROCEDURE — 99233 SBSQ HOSP IP/OBS HIGH 50: CPT | Mod: 24 | Performed by: NURSE PRACTITIONER

## 2021-10-18 PROCEDURE — 250N000012 HC RX MED GY IP 250 OP 636 PS 637: Performed by: NURSE PRACTITIONER

## 2021-10-18 PROCEDURE — 82805 BLOOD GASES W/O2 SATURATION: CPT | Performed by: THORACIC SURGERY (CARDIOTHORACIC VASCULAR SURGERY)

## 2021-10-18 PROCEDURE — 71045 X-RAY EXAM CHEST 1 VIEW: CPT | Mod: 77

## 2021-10-18 PROCEDURE — 250N000009 HC RX 250: Performed by: ANESTHESIOLOGY

## 2021-10-18 PROCEDURE — 258N000003 HC RX IP 258 OP 636: Performed by: ANESTHESIOLOGY

## 2021-10-18 PROCEDURE — 71045 X-RAY EXAM CHEST 1 VIEW: CPT | Mod: 26 | Performed by: RADIOLOGY

## 2021-10-18 PROCEDURE — 258N000003 HC RX IP 258 OP 636: Performed by: STUDENT IN AN ORGANIZED HEALTH CARE EDUCATION/TRAINING PROGRAM

## 2021-10-18 PROCEDURE — 82248 BILIRUBIN DIRECT: CPT | Performed by: INTERNAL MEDICINE

## 2021-10-18 PROCEDURE — 94640 AIRWAY INHALATION TREATMENT: CPT

## 2021-10-18 PROCEDURE — 999N000157 HC STATISTIC RCP TIME EA 10 MIN

## 2021-10-18 PROCEDURE — 250N000011 HC RX IP 250 OP 636

## 2021-10-18 PROCEDURE — 83735 ASSAY OF MAGNESIUM: CPT | Performed by: INTERNAL MEDICINE

## 2021-10-18 PROCEDURE — 250N000013 HC RX MED GY IP 250 OP 250 PS 637: Performed by: NURSE PRACTITIONER

## 2021-10-18 PROCEDURE — 250N000011 HC RX IP 250 OP 636: Performed by: THORACIC SURGERY (CARDIOTHORACIC VASCULAR SURGERY)

## 2021-10-18 PROCEDURE — 250N000011 HC RX IP 250 OP 636: Performed by: STUDENT IN AN ORGANIZED HEALTH CARE EDUCATION/TRAINING PROGRAM

## 2021-10-18 PROCEDURE — 999N000015 HC STATISTIC ARTERIAL MONITORING DAILY

## 2021-10-18 PROCEDURE — 94799 UNLISTED PULMONARY SVC/PX: CPT

## 2021-10-18 PROCEDURE — 250N000012 HC RX MED GY IP 250 OP 636 PS 637: Performed by: STUDENT IN AN ORGANIZED HEALTH CARE EDUCATION/TRAINING PROGRAM

## 2021-10-18 PROCEDURE — 94003 VENT MGMT INPAT SUBQ DAY: CPT

## 2021-10-18 PROCEDURE — 71045 X-RAY EXAM CHEST 1 VIEW: CPT | Mod: 26 | Performed by: STUDENT IN AN ORGANIZED HEALTH CARE EDUCATION/TRAINING PROGRAM

## 2021-10-18 PROCEDURE — 99291 CRITICAL CARE FIRST HOUR: CPT | Mod: 24 | Performed by: ANESTHESIOLOGY

## 2021-10-18 PROCEDURE — C9113 INJ PANTOPRAZOLE SODIUM, VIA: HCPCS | Performed by: NURSE PRACTITIONER

## 2021-10-18 PROCEDURE — 250N000009 HC RX 250: Performed by: NURSE PRACTITIONER

## 2021-10-18 PROCEDURE — 84155 ASSAY OF PROTEIN SERUM: CPT | Performed by: INTERNAL MEDICINE

## 2021-10-18 PROCEDURE — 71045 X-RAY EXAM CHEST 1 VIEW: CPT

## 2021-10-18 PROCEDURE — 250N000009 HC RX 250

## 2021-10-18 PROCEDURE — 80197 ASSAY OF TACROLIMUS: CPT | Performed by: NURSE PRACTITIONER

## 2021-10-18 PROCEDURE — 200N000002 HC R&B ICU UMMC

## 2021-10-18 PROCEDURE — 94640 AIRWAY INHALATION TREATMENT: CPT | Mod: 76

## 2021-10-18 PROCEDURE — 85018 HEMOGLOBIN: CPT | Performed by: ANESTHESIOLOGY

## 2021-10-18 PROCEDURE — 80202 ASSAY OF VANCOMYCIN: CPT | Performed by: THORACIC SURGERY (CARDIOTHORACIC VASCULAR SURGERY)

## 2021-10-18 RX ORDER — FUROSEMIDE 10 MG/ML
40 INJECTION INTRAMUSCULAR; INTRAVENOUS ONCE
Status: COMPLETED | OUTPATIENT
Start: 2021-10-18 | End: 2021-10-18

## 2021-10-18 RX ORDER — AMINO AC/PROTEIN HYDR/WHEY PRO 10G-100/30
1 LIQUID (ML) ORAL 2 TIMES DAILY
Status: DISCONTINUED | OUTPATIENT
Start: 2021-10-18 | End: 2021-10-28

## 2021-10-18 RX ORDER — POTASSIUM CHLORIDE 29.8 MG/ML
20 INJECTION INTRAVENOUS ONCE
Status: COMPLETED | OUTPATIENT
Start: 2021-10-18 | End: 2021-10-18

## 2021-10-18 RX ORDER — DOBUTAMINE HYDROCHLORIDE 200 MG/100ML
5 INJECTION INTRAVENOUS CONTINUOUS
Status: DISCONTINUED | OUTPATIENT
Start: 2021-10-18 | End: 2021-10-19

## 2021-10-18 RX ORDER — FUROSEMIDE 10 MG/ML
20 INJECTION INTRAMUSCULAR; INTRAVENOUS ONCE
Status: COMPLETED | OUTPATIENT
Start: 2021-10-18 | End: 2021-10-18

## 2021-10-18 RX ORDER — TACROLIMUS 0.5 MG/1
2 CAPSULE ORAL
Status: DISCONTINUED | OUTPATIENT
Start: 2021-10-18 | End: 2021-10-19

## 2021-10-18 RX ORDER — LIDOCAINE HYDROCHLORIDE 20 MG/ML
5 SOLUTION OROPHARYNGEAL ONCE
Status: COMPLETED | OUTPATIENT
Start: 2021-10-18 | End: 2021-10-18

## 2021-10-18 RX ORDER — PROPOFOL 10 MG/ML
5-75 INJECTION, EMULSION INTRAVENOUS CONTINUOUS
Status: DISCONTINUED | OUTPATIENT
Start: 2021-10-18 | End: 2021-10-21

## 2021-10-18 RX ORDER — ESCITALOPRAM OXALATE 5 MG/1
5 TABLET ORAL DAILY
Status: DISCONTINUED | OUTPATIENT
Start: 2021-10-18 | End: 2021-10-23

## 2021-10-18 RX ORDER — NOREPINEPHRINE BITARTRATE 0.06 MG/ML
INJECTION, SOLUTION INTRAVENOUS
Status: COMPLETED
Start: 2021-10-18 | End: 2021-10-18

## 2021-10-18 RX ORDER — NOREPINEPHRINE BITARTRATE 0.06 MG/ML
.01-.6 INJECTION, SOLUTION INTRAVENOUS CONTINUOUS
Status: DISCONTINUED | OUTPATIENT
Start: 2021-10-18 | End: 2021-10-20

## 2021-10-18 RX ORDER — DEXMEDETOMIDINE HYDROCHLORIDE 4 UG/ML
.2-1.2 INJECTION, SOLUTION INTRAVENOUS CONTINUOUS
Status: DISCONTINUED | OUTPATIENT
Start: 2021-10-18 | End: 2021-10-22

## 2021-10-18 RX ORDER — BUMETANIDE 0.25 MG/ML
1 INJECTION INTRAMUSCULAR; INTRAVENOUS ONCE
Status: COMPLETED | OUTPATIENT
Start: 2021-10-18 | End: 2021-10-18

## 2021-10-18 RX ADMIN — ACETAMINOPHEN 975 MG: 325 TABLET, FILM COATED ORAL at 07:36

## 2021-10-18 RX ADMIN — MULTIVIT AND MINERALS-FERROUS GLUCONATE 9 MG IRON/15 ML ORAL LIQUID 15 ML: at 08:00

## 2021-10-18 RX ADMIN — DEXMEDETOMIDINE 1.2 MCG/KG/HR: 100 INJECTION, SOLUTION, CONCENTRATE INTRAVENOUS at 18:12

## 2021-10-18 RX ADMIN — NOREPINEPHRINE BITARTRATE 0.03 MCG/KG/MIN: 0.06 INJECTION, SOLUTION INTRAVENOUS at 14:36

## 2021-10-18 RX ADMIN — PANTOPRAZOLE SODIUM 40 MG: 40 INJECTION, POWDER, FOR SOLUTION INTRAVENOUS at 20:42

## 2021-10-18 RX ADMIN — MYCOPHENOLATE MOFETIL 1500 MG: 200 POWDER, FOR SUSPENSION ORAL at 20:44

## 2021-10-18 RX ADMIN — POTASSIUM CHLORIDE 20 MEQ: 29.8 INJECTION, SOLUTION INTRAVENOUS at 07:12

## 2021-10-18 RX ADMIN — NYSTATIN 1000000 UNITS: 500000 SUSPENSION ORAL at 20:43

## 2021-10-18 RX ADMIN — TACROLIMUS 2 MG: 0.5 CAPSULE ORAL at 18:53

## 2021-10-18 RX ADMIN — PROPOFOL 75 MCG/KG/MIN: 10 INJECTION, EMULSION INTRAVENOUS at 18:50

## 2021-10-18 RX ADMIN — HEPARIN SODIUM 5000 UNITS: 10000 INJECTION, SOLUTION INTRAVENOUS; SUBCUTANEOUS at 10:46

## 2021-10-18 RX ADMIN — Medication 1 PACKET: at 10:47

## 2021-10-18 RX ADMIN — FUROSEMIDE 20 MG: 10 INJECTION, SOLUTION INTRAVENOUS at 10:46

## 2021-10-18 RX ADMIN — AMIODARONE HYDROCHLORIDE 150 MG: 1.5 INJECTION, SOLUTION INTRAVENOUS at 14:23

## 2021-10-18 RX ADMIN — ACYCLOVIR 400 MG: 200 SUSPENSION ORAL at 20:44

## 2021-10-18 RX ADMIN — POLYETHYLENE GLYCOL 3350 17 G: 17 POWDER, FOR SOLUTION ORAL at 07:36

## 2021-10-18 RX ADMIN — LEVALBUTEROL HYDROCHLORIDE 1.25 MG: 1.25 SOLUTION RESPIRATORY (INHALATION) at 15:17

## 2021-10-18 RX ADMIN — ACETYLCYSTEINE 2 ML: 200 SOLUTION ORAL; RESPIRATORY (INHALATION) at 20:10

## 2021-10-18 RX ADMIN — DOCUSATE SODIUM 50 MG AND SENNOSIDES 8.6 MG 1 TABLET: 8.6; 5 TABLET, FILM COATED ORAL at 07:36

## 2021-10-18 RX ADMIN — PANTOPRAZOLE SODIUM 40 MG: 40 INJECTION, POWDER, FOR SOLUTION INTRAVENOUS at 07:35

## 2021-10-18 RX ADMIN — TACROLIMUS 1 MG: 0.5 CAPSULE ORAL at 07:34

## 2021-10-18 RX ADMIN — PROPOFOL 75 MCG/KG/MIN: 10 INJECTION, EMULSION INTRAVENOUS at 15:15

## 2021-10-18 RX ADMIN — GABAPENTIN 100 MG: 250 SUSPENSION ORAL at 07:35

## 2021-10-18 RX ADMIN — DEXMEDETOMIDINE 0.2 MCG/KG/HR: 100 INJECTION, SOLUTION, CONCENTRATE INTRAVENOUS at 09:39

## 2021-10-18 RX ADMIN — Medication 1 PACKET: at 20:45

## 2021-10-18 RX ADMIN — LEVALBUTEROL HYDROCHLORIDE 1.25 MG: 1.25 SOLUTION RESPIRATORY (INHALATION) at 11:28

## 2021-10-18 RX ADMIN — ESCITALOPRAM 5 MG: 5 TABLET, FILM COATED ORAL at 10:46

## 2021-10-18 RX ADMIN — LEVALBUTEROL HYDROCHLORIDE 1.25 MG: 1.25 SOLUTION RESPIRATORY (INHALATION) at 20:10

## 2021-10-18 RX ADMIN — POTASSIUM CHLORIDE 20 MEQ: 29.8 INJECTION, SOLUTION INTRAVENOUS at 00:02

## 2021-10-18 RX ADMIN — CEFTAZIDIME 2 G: 2 INJECTION, POWDER, FOR SOLUTION INTRAVENOUS at 23:37

## 2021-10-18 RX ADMIN — PREDNISOLONE 17.5 MG: 15 SOLUTION ORAL at 20:44

## 2021-10-18 RX ADMIN — Medication 0.03 MCG/KG/MIN: at 14:36

## 2021-10-18 RX ADMIN — NYSTATIN 1000000 UNITS: 500000 SUSPENSION ORAL at 07:36

## 2021-10-18 RX ADMIN — DOCUSATE SODIUM 50 MG AND SENNOSIDES 8.6 MG 1 TABLET: 8.6; 5 TABLET, FILM COATED ORAL at 20:43

## 2021-10-18 RX ADMIN — HUMAN INSULIN 6 UNITS/HR: 100 INJECTION, SOLUTION SUBCUTANEOUS at 15:02

## 2021-10-18 RX ADMIN — DEXTROSE 3 MCG/KG/MIN: 50 INJECTION, SOLUTION INTRAVENOUS at 16:38

## 2021-10-18 RX ADMIN — AMIODARONE HYDROCHLORIDE 1 MG/MIN: 50 INJECTION, SOLUTION INTRAVENOUS at 15:12

## 2021-10-18 RX ADMIN — ACETYLCYSTEINE 2 ML: 200 SOLUTION ORAL; RESPIRATORY (INHALATION) at 11:28

## 2021-10-18 RX ADMIN — DEXMEDETOMIDINE 1.2 MCG/KG/HR: 100 INJECTION, SOLUTION, CONCENTRATE INTRAVENOUS at 23:02

## 2021-10-18 RX ADMIN — EPINEPHRINE 0.04 MCG/KG/MIN: 1 INJECTION PARENTERAL at 09:05

## 2021-10-18 RX ADMIN — PREDNISOLONE 17.5 MG: 15 SOLUTION ORAL at 07:35

## 2021-10-18 RX ADMIN — GABAPENTIN 100 MG: 250 SUSPENSION ORAL at 20:43

## 2021-10-18 RX ADMIN — LIDOCAINE HYDROCHLORIDE 15 ML: 20 SOLUTION ORAL; TOPICAL at 09:58

## 2021-10-18 RX ADMIN — PROPOFOL 50 MCG/KG/MIN: 10 INJECTION, EMULSION INTRAVENOUS at 09:46

## 2021-10-18 RX ADMIN — PROPOFOL 75 MCG/KG/MIN: 10 INJECTION, EMULSION INTRAVENOUS at 21:29

## 2021-10-18 RX ADMIN — ACETYLCYSTEINE 2 ML: 200 SOLUTION ORAL; RESPIRATORY (INHALATION) at 15:17

## 2021-10-18 RX ADMIN — NYSTATIN 1000000 UNITS: 500000 SUSPENSION ORAL at 15:44

## 2021-10-18 RX ADMIN — ACETAMINOPHEN 975 MG: 325 TABLET, FILM COATED ORAL at 23:40

## 2021-10-18 RX ADMIN — PROPOFOL 40 MCG/KG/MIN: 10 INJECTION, EMULSION INTRAVENOUS at 05:11

## 2021-10-18 RX ADMIN — LEVOTHYROXINE SODIUM 25 MCG: 0.03 TABLET ORAL at 07:37

## 2021-10-18 RX ADMIN — HUMAN INSULIN 5 UNITS/HR: 100 INJECTION, SOLUTION SUBCUTANEOUS at 03:13

## 2021-10-18 RX ADMIN — Medication: at 23:40

## 2021-10-18 RX ADMIN — HEPARIN SODIUM 5000 UNITS: 10000 INJECTION, SOLUTION INTRAVENOUS; SUBCUTANEOUS at 03:19

## 2021-10-18 RX ADMIN — ACYCLOVIR 400 MG: 200 SUSPENSION ORAL at 07:35

## 2021-10-18 RX ADMIN — ACETYLCYSTEINE 2 ML: 200 SOLUTION ORAL; RESPIRATORY (INHALATION) at 08:02

## 2021-10-18 RX ADMIN — GABAPENTIN 100 MG: 250 SUSPENSION ORAL at 13:55

## 2021-10-18 RX ADMIN — CEFTAZIDIME 2 G: 2 INJECTION, POWDER, FOR SOLUTION INTRAVENOUS at 11:02

## 2021-10-18 RX ADMIN — HEPARIN SODIUM 5000 UNITS: 10000 INJECTION, SOLUTION INTRAVENOUS; SUBCUTANEOUS at 20:43

## 2021-10-18 RX ADMIN — MYCOPHENOLATE MOFETIL 1500 MG: 200 POWDER, FOR SUSPENSION ORAL at 07:38

## 2021-10-18 RX ADMIN — Medication 100 MCG/HR: at 00:30

## 2021-10-18 RX ADMIN — NYSTATIN 1000000 UNITS: 500000 SUSPENSION ORAL at 11:45

## 2021-10-18 RX ADMIN — LEVALBUTEROL HYDROCHLORIDE 1.25 MG: 1.25 SOLUTION RESPIRATORY (INHALATION) at 08:02

## 2021-10-18 RX ADMIN — BUMETANIDE 1 MG: 0.25 INJECTION, SOLUTION INTRAMUSCULAR; INTRAVENOUS at 16:32

## 2021-10-18 RX ADMIN — FUROSEMIDE 40 MG: 10 INJECTION, SOLUTION INTRAVENOUS at 14:44

## 2021-10-18 RX ADMIN — ACETAMINOPHEN 975 MG: 325 TABLET, FILM COATED ORAL at 15:44

## 2021-10-18 ASSESSMENT — ACTIVITIES OF DAILY LIVING (ADL)
ADLS_ACUITY_SCORE: 16
ADLS_ACUITY_SCORE: 12
ADLS_ACUITY_SCORE: 16
ADLS_ACUITY_SCORE: 12
ADLS_ACUITY_SCORE: 16

## 2021-10-18 NOTE — PROGRESS NOTES
CLINICAL NUTRITION SERVICES - BRIEF NOTE   (See RD note on 10/12 for full assessment)     Reason for RD note: Following up on post-pyloric enteral access plan.    New Findings/Chart Review:  Nutrition support: Osmolite 1.5 @ 20 mL/hr via OGT    Enteral Access: OGT - needs post-pyloric access given s/p lung txp.    Interventions:  1. Discussed duodenal anomaly and hiatal hernia concerns with Radiology over phone and also with Pulmonology and CVTS teams during rounds (duodenal perf vs diverticula as noted in CT abdomen/pelvis on 7/19/2021). Radiology will attempt to place and contact if any concerns arise. Suspect any potential of duodenal perforation has passed given pt was tolerating oral diet this hospitalization prior to lung txp. EGD determined to not be needed during discussion in rounds.     2. Updated EN support:  Osmolite 1.5 Conner @ goal of  60ml/hr (1440ml/day) + 1 packet ProSource BID (2 pkts total) will provide: 2240 kcals (35 kca/kg), 112 g PRO (1.8 g/kg), 1097 ml free H20, 293 g CHO, and 0 g fiber daily.  -Once post-pyloric placed by Radiology and confirmed ok for use, from 20 mL/hr, advance by 10 mL q8hr as tolerated to goal  -Do not advance unless K+ >/= 3.4, Mg++ >1.5, and phos >1.9    Future/Additional Recommendations:  -Monitor ability to obtain post-pyloric enteral access  -Monitor tolerance and lytes with advancement to goal TF rate    Nutrition will continue to follow per protocol.    Lydia Gallardo RD, LD  Pager: [phone]

## 2021-10-18 NOTE — OP NOTE
DATE OF SERVICE:  10/16/2021      PREOPERATIVE DIAGNOSES:   1.  Severe end-stage life-threatening lung disease due to interstitial lung disease  2.  Rheumatoid arthritis  3.  Recent HSV infection  4.  Steroid induced diabetes  5.  Hypothyroidism  6.  Obstructive sleep apnea  7.  Hypertension  8.  Anxiety/depression     POSTOPERATIVE DIAGNOSES:     1.  Severe end-stage life-threatening lung disease due to interstitial lung disease  2.  Rheumatoid arthritis  3.  Recent HSV infection  4.  Steroid induced diabetes  5.  Hypothyroidism  6.  Obstructive sleep apnea  7.  Hypertension  8.  Anxiety/depression     PROCEDURES:   1.  Bilateral anterolateral thoracotomies with transverse sternotomy and central cannulation for cardiopulmonary bypass.   2.  Bilateral transplant pneumonectomies.   3.  Back table preparation of bilateral single lung for sequential lung transplantation.   4.  Bilateral sequential lung transplantation.      SURGEON:  Yanick Corral MD     CO SURGEON: Efe Castro MD (I was the second surgeon needed because another qualified fellow was not available. The case was complex due to critical illness and complexity of the procedure. I performed distinct portion of the case detailed below.)    RESIDENT SURGEON:  Karen Cardozo MD      ANESTHESIA:  General endotracheal anesthesia with a double-lumen endotracheal tube.      ESTIMATED BLOOD LOSS:  1L.      SPECIMENS:  Bilateral native lung.      INDICATIONS:  Mr. Thornton is a 56-year-old man with history of ILD who has been an inpatient for acute respiratory failure. He was deemed an appropriate candidate for lung transplant.    OPERATIVE DESCRIPTION:  I assisted Dr. Corral for the case and performed the left sided lung dissection and anastomosis. The left hilum was dissected with a combination of cautery and sharp scissors. Care was taken to avoid injury to the left phrenic nerve. Once the left pulmonary veins and artery were isolated with vessel loops, they were  divided after placing Derra clamp on the pulmonary artery.  Right main stem was divided with scalpel. Hemostasis was achieved with cautery. Donor lung was brought to the field and placed in the left chest in proper anatomic position.     Bronchial anastomosis was completed with running 4-0 prolene in end to end fashion. Anastomosis was visualized with bronchoscopy. Next the left pulmonary vein anastomosis was completed with  4-0 prolene in end to end fashion. Finally the left pulmonary artery was anastomosed with 5-0 prolene in end to end manner. Kimberlee clamp was removed and lung vasculature was deaired.    Next we proceeded to the right lung. Please see Dr. Corral's operative note for details.    Efe Castro MD   Cardiothoracic Surgery  P: [phone]  C: [phone]

## 2021-10-18 NOTE — PROGRESS NOTES
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    OVERNIGHT EVENTS:  NAEO      TODAY'S PROGRESS:   - Patient needs to be paralyzed deeply sedated as he tends to get hypoxic and goes into arrhythmias  - Lasix 20 mg x1, 40 mg x1, Bumex 1mg x 1  - f/u evening Hb and lytes  - Patient had an episode of A.fib- Amio bolus 150 mg followed by infusion    PLAN:  Neuro/ pain/ sedation:  Acute Postoperative pain  Anxiety  Depression  - Monitor neurological status. Notify the MD for any acute changes in exam.  - Pain: Fentanyl gtt. Tylenol, Robaxin, Gabapentin ramses. PRN oxycodone, IV dilaudid,  - Sedation: Propofol, Dexmedetomdine gtt  - Paralysis: Cisatracurium gtt  - BIS monitoring- goals 40-60  - Continue PTA Escitalopram, Melatonin     Pulmonary care:   Postoperative ventilation management  S/p B/l lung transplant  SALTY on home CPAP  - Ventilator Settings:CMV- 18/400/12/90  - Caroline- 20  - Bronchoscopy(10/17) did not show much secretions, edema +  - Levo-albuterol nebs and Mucomyst  - Chest Physiotherapy  - Daily CXR  - Titrate supplemental oxygen to maintain saturation above 92%.  - Pulmonary hygiene: Incentive spirometer every 15- 30 minutes when awake, flutter valve, C&DB  - Diuresis: Lasix 20 mg x1, 40 mg x1, Bumex 1 mgx1        Cardiovascular:    S/p b/l lung transplant on 10/16/21 by Dr. Corral  HTN  A.fib- resolved  Recent echo on 09/07/21 with LVEF of 60-65%. Pulmonary HTN.     - Cardiac Cath(9/8/21)-     Right sided filling pressures are mildly elevated.    Moderately elevated pulmonary artery hypertension.    Left sided filling pressures are normal.    Normal cardiac output  level.    Normal coronary arteries with mild tortuosity     - Monitor hemodynamic status.   - Lactic Acid: 2.1  - Goal MAP >65  - Pressors: Epi, Norepi.   - Dobutamine available.  - Nitroprusside gtt for high SVR   - Statin: none  - ASA: one  - PTA meds: Amlodipine withheld  - Patient had an episode of A.fib- Amio bolus 150 mg followed by infusion             GI care/ Nutrition:   Elevated LFTs- resolving  - Diet: NJ placement today. Advance feeds  - PPI  - Continue bowel regimen: miralax, senna  - Trend LFTs    Renal/ Fluid Balance/ Electrolytes:   BL creat appears to be ~ 0.7   - Diuresis: Lasix 20 mg x1, 40 mg x1, Bumex 1 mgx1  - Strict I/O, daily weights  - Avoid/limit nephrotoxins as able  - Replete lytes PRN per protocol  -   Endocrine:    Stress induced hyperglycemia  Hypothyroidism  DM  RA  Preop A1c -6.6  - Insulin gtt  - Goal BG <180 for optimal healing  - PTA meds: Solumedrol 125 mg q6, Synthroid 25 mcg  - Received 2 doses of Rituximab in 6/21  - Rheumatology consult, will reach out to them on Monday     ID/ Antibiotics:  Immunosuppression  Stress induced leukocytosis  - pre-op WBC -22.9  - Completing today perioperative regimen- Ceftazidime, Vancomycin  - Nystatin, Acyclovir  - Tacrolimus  - Basiliximab on POD#4  - Methylpred followed by oral prednisone  - Continue to monitor fever curve, WBC and inflammatory markers as appropriate  - Pan cultures sent today- follow up  - (PTA Bactrim, Cefepime, Doxycycline)  -Azathioprine to be avoided after transplant given low TPMT based on recommendations of transplant pulmonology, however if used, then azathioprine to be used in a low-dose     Heme:     Acute blood loss anemia  Acute blood loss thrombocytopenia  Hypogammaglobulinemia  Pre-op Hb- 11.1, Platelet- 224  Consider IVIG tomorrow  No s/sx active bleeding  - CBC in AM  - ID as above     MSK/ Skin:  Sternotomy  Surgical Incision  - Sternal precautions  - Postoperative incision management per protocol  -  PT/OT/CR     Prophylaxis:    - Mechanical prophylaxis for DVT: SCDs  - Chemical DVT prophylaxis: Heparin subcutaneous   - PPI for 30 days postoperatively     Lines/ tubes/ drains:  - Right PICC(pre-op)  - Right radial A line(10/16)  - Right CVC(10/16)  - Right [place](10/16)  - ETT(10/16)  - [place](10/16)  - Chest tubes- 4 pleural, 1 mediastinal(10/16)  - OG(10/16)   - NJ(10/18)     Disposition:  - [facility] ICU.      Patient seen, findings and plan discussed with CV ICU staff.     Eunice Gutiérrez MD  PGY-3  Anesthesia    ====================================    SUBJECTIVE:   Patient is sedated and intubated. The FiO2 needs go up and BP drops when people wakes up and is agitated.    OBJECTIVE:   1. VITAL SIGNS:   Temp:  [98.6  F (37  C)-101.7  F (38.7  C)] 99  F (37.2  C)  Pulse:  [] 105  Resp:  [20-24] 20  MAP:  [60 mmHg-96 mmHg] 67 mmHg  Arterial Line BP: ()/(9-76) 102/58  FiO2 (%):  [60 %-100 %] 60 %  SpO2:  [87 %-100 %] 97 %  Ventilation Mode: CMV/AC  (Continuous Mandatory Ventilation/ Assist Control)  FiO2 (%): 60 %  Rate Set (breaths/minute): 20 breaths/min  Tidal Volume Set (mL): 400 mL  PEEP (cm H2O): 12 cmH2O  Oxygen Concentration (%): 60 %  Resp: 20      2. INTAKE/ OUTPUT:   I/O last 3 completed shifts:  In: 3166.09 [I.V.:2846.09; NG/GT:220]  Out: 1865 [Urine:595; Emesis/NG output:250; Chest Tube:1020]    3. PHYSICAL EXAMINATION:   General: sedated and intubated  Neuro: can't be assessed  Resp: ventilator dependent  CV: RRR  Abdomen: Soft, Non-distended, Non-tender  Incisions: c/d/i  Extremities: warm and well perfused    4. INVESTIGATIONS:   Arterial Blood Gases   Recent Labs   Lab 10/18/21  0404 10/17/21  2359 10/17/21  1959 10/17/21  1537   PH 7.49* 7.47* 7.48* 7.45   PCO2 35 36 38 42   PO2 89 122* 101 108*   HCO3 27 26 28 29*     Complete Blood Count   Recent Labs   Lab 10/18/21  0403 10/17/21  1212 10/17/21  0346 10/16/21  2032 10/16/21  1535 10/16/21  1535   WBC 44.5*  --  39.9* 69.5*  --  60.0*   HGB  8.7* 9.5* 11.2* 12.2*   < > 11.8*     --  186 250  --  238    < > = values in this interval not displayed.     Basic Metabolic Panel  Recent Labs   Lab 10/18/21  0554 10/18/21  0407 10/18/21  0403 10/18/21  0310 10/17/21  2358 10/17/21  2207 10/17/21  1542 10/17/21  1535 10/17/21  0458 10/17/21  0346   NA  --   --  145*  --   --  146*  --  144  --  146*   POTASSIUM  --   --  3.7  --   --  3.7  --  4.3  --  3.8   CHLORIDE  --   --  108  --   --  111*  --  107  --  109   CO2  --   --  30  --   --  29  --  31  --  26   BUN  --   --  30  --   --  27  --  25  --  20   CR  --   --  1.56*  --   --  1.45*  --  1.50*  --  1.20   * 131* 147* 160*   < > 144*   < > 188*   < > 141*    < > = values in this interval not displayed.     Liver Function Tests  Recent Labs   Lab 10/18/21  0403 10/17/21  2207 10/17/21  1535 10/17/21  0346 10/16/21  2032   AST  --  380* 550* 1,246* 230*   ALT  --  994* 1,185* 1,550* 212*   ALKPHOS  --  57 60 52 62   BILITOTAL  --  0.9 1.1 1.1 1.5*   ALBUMIN  --  1.6* 2.0* 1.3* 1.7*   INR 2.08*  --  1.75* 1.77* 1.54*     Pancreatic Enzymes  No lab results found in last 7 days.  Coagulation Profile  Recent Labs   Lab 10/18/21  0403 10/17/21  1535 10/17/21  0346 10/16/21  2032 10/16/21  1535 10/16/21  1535 10/15/21  0907 10/15/21  0907   INR 2.08* 1.75* 1.77* 1.54*   < > 1.38*   < > 1.02   PTT  --   --  41* 42*  --  51*  --  30    < > = values in this interval not displayed.         5. RADIOLOGY:   Recent Results (from the past 24 hour(s))   XR Chest Port 1 View    Narrative    Exam: XR CHEST PORT 1 VIEW, 10/17/2021 10:40 AM    Indication: Intubated    Comparison: 10/16/2021    Findings:   Endotracheal tube in the distal third of the esophagus approximately 2  cm above the shiv. Enteric tube tip and sidehole projected off the  film but at least to the level of the stomach. [place] catheter tip  in the main right pulmonary artery. There are bibasilar chest tubes  and biapical chest tubes in  place. Stable mediastinal chest tube Right  arm PICC tip in the low superior vena cava.     Stable cardiomegaly. Pulmonary vasculature obscured by diffuse mixed  opacities throughout both lungs.      Impression    Impression:   1. Stable support devices.  2. Slight increase in the diffuse mixed opacities throughout both  lungs, suggesting edema.    MAURICIO NAVAS MD         SYSTEM ID:  [id number]   XR Chest Port 1 View    Narrative    Exam: XR CHEST PORT 1 VIEW, 10/17/2021 12:33 PM    Indication: s/p lung transplant - increased oxygen requirements    Comparison: 10/17/2021    Findings:   Endotracheal tube in the distal third of the trachea. Enteric tube tip  and sidehole project off the film but at least to the level of the  stomach. [place] catheter tip in the Main pulmonary artery. Biapical  chest tubes and bibasilar chest tubes remain in place. Single  mediastinal tube is unchanged.    Heart within normal limits in size. Pulmonary vasculature obscured by  diffuse mixed opacities throughout both lungs.      Impression    Impression:   1. Stable support devices  2. Diffuse mixed opacities throughout both lungs suggesting edema or  infection are stable.    MAURICIO NAVAS MD         SYSTEM ID:  [id number]       =========================================

## 2021-10-18 NOTE — PROGRESS NOTES
Pulmonary Medicine  Cystic Fibrosis - Lung Transplant Team  Progress Note  10/18/2021       Patient: Edson Thornton  MRN: [id number]  : 1965 (age 56 year old)  Transplant: 10/16/2021 (Lung), POD#2  Admission date: 2021    Assessment & Plan:     Edson Thornton is a 56 year old male with a PMH significant for NSIP/ILD, bronchiectasis, moderate PH, RA, SALTY, chronic HSV infection, hypogammaglobulinemia, steroid-induced diabetes, hypothyroidism, HTN, HLD, duo anxiety, and depression.  Admitted on 21 from OSH for acute on chronic respiratory failure 2/2 ILD exacerbation.  Pt. is now s/p BSLT on 10/16/21.  Surgery relatively uncomplicated but pt. with low cardiac index and PGD immediately post-op.  The patient is currently POD #2 in the CVICU.    Today's recommendations:   - No indication for bronchoscopy today  - CXR today  - Placement of NJ by IR  - Chest CT non-contrast when able, okay to defer today  - Tacro level today to trend, dose increased, continue daily levels  - Next prednisone taper 1024  - Continue to hold Bactrim  - Acyclovir through POD #30  - IVIG indicated but defer today     S/p BSLT for ILD:  Acute hypoxic/hypercapneic respiratory failure: Patient is currently on epinephrine for low CI with variable pO2 on current vent settings (morning pO2 44 from 87) on CMV 20/450/5/65, continues on Caroline at 20.  Unfortunately had not received vaccination for flu, PNA, or COVID-19 PTA (not available after admission d/t immunosuppression state).  POD #1 CXR with new RLL focal opacity and marked increase in diffuse interstitial opacities concerning for pulmonary edema.  - Nebs: levalbuterol and Mucomyst QID  - Aggressive pulmonary toilet with chest physiotherapy QID (addition of Aerobika and incentive spirometry once extubated)  - Ventilator management per ICU team  - Bronchoscopy deferred today  - Anesthesia to place erector spinae catheters prior to anticipated extubation per our routine, deferred  today  - Chest tubes managed by surgical team  - Diuresis per primary team  - CXR today with continued right mid lung perihilar opacity and slight increase in LLL opacity (though field obscured in imaging, personally reviewed)  - Placement of NJ by IR, trickle feeds via OG in the interim  - Recommend SLP consult as pt. nears extubation for swallowing evaluation  - Await explant pathology (should include mediastinal LN)  - Chest CT non-contrast when able, okay to defer today     Immunosuppression:  - Induction therapy with basiliximab (and high dose IV steroid) given intraoperatively, repeating basiliximab dose on POD#4 (ordered)  - Tacrolimus SL BID.  Goal tacrolimus level 8-12.  Continuing SL dosing until tacro goal is achieved (target: within 7 days post-op) and return of bowel function post-op.  Daily tacrolimus levels for the first 1-2 weeks with dose adjustments prn based on levels and changes in medications/patient condition.  See below for initial levels and dosing trends:  Date Tacro Level Intervention   10/18 <3 Dose increased from 1 mg to 2 mg BID                   - MMF 1500 mg BID (on PTA, AZA to be avoided given TPMT)  - Prednisolone 17.5 mg BID with next taper on 10/24 (not yet ordered) per lung transplant protocol:  Date AM dose (mg) PM dose (mg)   10/17/21 17.5 17.5   10/24/21 15 15   10/31/21 15 12.5   11/7/21 12.5 12.5   11/21/21 12.5 10   12/5/21 10 10   1/2/22 10 7.5   1/30/22 7.5 7.5   2/27/22 7.5 5   3/27/22 5 5   4/24/22 5 2.5      Prophylaxis:   - Bactrim for PJP ppx deferred post-op pending assessment of renal function  - Nystatin for oral candidiasis ppx, 6 month course  - See below for serologies and viral ppx:    Donor Recipient Intervention   CMV status Negative Negative None   EBV status Positive Positive N/A, EBV monthly   HSV status N/A Positive Acyclovir POD #1-30   (recent infection history)      Primary graft dysfunction (per ISHLT guidelines):  See chart below:    POD #0  (~0  hours) POD #1  (~24 hours) POD #2   (~48 hours) POD#3   (~72 hours)   Date 10/16 10/17 10/18 10/19   Time 1536 1537       Intubated Y Y Y/N Y/N   PaO2 227 108       FiO2 100 100       P/F Ratio 227 108       PGD Grade   (0=mild, 3=severe) 2 3       ECMO N N Y/N Y/N   Inhaled NO/Flolan Y Y Y/N Y/N   ISHLT PGD Scoring  Grade 0 - PaO2/FiO2 >300 and normal chest radiograph (absence of diffuse allograft infiltrates)  Grade 1 - PaO2/FiO2 >300 and diffuse allograft infiltrates on chest radiograph  Grade 2 - PaO2/FiO2 between 200 and 300  Grade 3 - PaO2/FiO2 <200 and/or on ECMO     ID: Concern for possible strongyloides exposure pre-transplant s/p ivermectin x1 dose (9/17).  Fevers post-op, Tmax 101.7 POD #1.  - Donor cultures NGTD (UNOS data personally reviewed this morning)  - Recipient cultures NGTD (including fungal and AFB)   - Blood cultures (10/17) NGTD  - Bronch cultures (10/17) NGTD  - Monthly Coccidioises Ag x12 months post-transplant per ID (urine and serum pending 10/17)  - IV ceftaz/vancomycin for 48h per protocol, will adjust antibiotic plan based on results of the above cultures  - Low threshold for transplant ID consult given pre-transplant ID history     HSV: Chronic intermittent active infection pre-transplant with recent HSV infection: crusted lesions throughout left side of jaw, s/p 10 day treatment course of ACV through 10/9.  - ACV ppx as above (starting on POD #1 instead of POD #8 given HSV history and location)     Hypogammaglobulinemia: IgG previously low at 364 (9/7).  Noted at 265 at time of transplant (10/15).  - Plan to give IVIG with premedication when appropriate given fluid status, defer today    Positive cross match: Note that he received two doses of rituximab in June, which is likely contributing to cross match result.  - DSA pending (10/17)     PHS risk criteria donor:  Additional labs required post-transplant (between 4-8 weeks post-op): Hepatitis B, Hepatitis C, and HIV by VISHAL ([id number],  "ordered POD #30).     SALTY: Noted pre-transplant. Home CPAP 6-12 cmH2)  - Resume BiPAP at night post-extubation     Other relevant problems being managed by primary team:     Elevated LFTs: Possible shock liver post-op.  Initial ALT//230 evening of surgery, then with marked increase to 1550/1246 POD #1  - Daily LFTs     BRENNAN: Baseline creatinine 0.7, increased to 1.2 POD #1.  S/p 3.5 L volume resuscitation the first 12 hours post-op for rising lactic acid (peak 9).  UO also downtrending.  - Bactrim held as above     We appreciate the excellent care provided by the CVTS and CVICU teams.  Recommendations communicated via in person rounding and this note.  Will continue to follow along closely, please do not hesitate to call with any questions or concerns.     Patient seen and discussed with Dr. Mckeon.    Renetta Holloway, DNP, APRN, CNP  Inpatient Nurse Practitioner  Pulmonary CF/Transplant     Subjective & Interval History:     Remains intubated with increase in PEEP from 5 to 8 yesterday morning and now at 12, PIP 25-32 at rest.  Short expiratory phase and desaturation with PIP 50 with agitation (recurrence this morning despite no recent intervention or trigger).  UO remains low at ~15 ml/hr, creatinine still increasing some, low dose Lasix given yesterday.  Tmax 101.7 over past 24h.    Review of Systems:     ROS as above, otherwise severely limited by sedation and intubation.    Physical Exam:     Vital signs:  Temp: 99  F (37.2  C) Temp src: Bladder   Pulse: 96   Resp: 20 SpO2: 98 % O2 Device: Mechanical Ventilator Oxygen Delivery: 15 LPM Height: 162.6 cm (5' 4\") Weight: 73 kg (160 lb 15 oz)  I/O:     Intake/Output Summary (Last 24 hours) at 10/18/2021 0735  Last data filed at 10/18/2021 0700  Gross per 24 hour   Intake 1980.87 ml   Output 1470 ml   Net 510.87 ml     Constitutional: Lying in bed, in no apparent distress.   HEENT: Eyes with pink conjunctivae, anicteric.  Orally intubated.  PULM: Mildly diminished " bases bilaterally.  Coarse t/o, no rhonchi, no wheezes.  Labored breathing on full vent support.  CV: Normal S1 and S2.  Tachycardia.  No murmur, gallop, or rub.  No peripheral edema.  Feet cool.  ABD: BS hypo, soft, nontender, nondistended.    MSK: Moves all extremities.     NEURO: Sedated, not conversant.   SKIN: Warm, dry, pale.     PSYCH: Mildly restless.     Lines, Drains, and Devices:  PICC Triple Lumen Right  (Active)   Site Assessment WDL 10/18/21 0400   External Cath Length (cm) 0 cm 10/11/21 1140   Dressing Intervention Chlorhexidine patch;Transparent 10/18/21 0400   Dressing Change Due 10/24/21 10/18/21 0400   PICC Comment CDI 10/18/21 0400   Gray - Status infusing 10/18/21 0400   Mosley - Cap Change Due 10/19/21 10/18/21 0400   Red - Status infusing 10/18/21 0400   Red - Cap Change Due 10/19/21 10/18/21 0400   White - Status infusing 10/18/21 0400   White - Cap Change Due 10/19/21 10/18/21 0400   Extravasation? No 10/18/21 0400   Line Necessity Yes, meets criteria 10/18/21 0400   Number of days:        Arterial Line 10/16/21 (Active)   Site Assessment WDL 10/18/21 0400   Line Status Pulsatile blood flow 10/18/21 0400   Arterine Line Cap Change Due 10/19/21 10/18/21 0400   Art Line Waveform Appropriate 10/18/21 0400   Art Line Interventions Leveled;Connections checked and tightened;Flushed per protocol 10/18/21 0400   Color/Movement/Sensation Capillary refill less than 3 sec 10/18/21 0400   Line Necessity Yes, meets criteria 10/18/21 0400   Dressing Type Transparent 10/18/21 0400   Dressing Status Clean, dry, intact 10/18/21 0400   Dressing Intervention Dressing changed/new dressing 10/17/21 0400   Dressing Change Due 10/24/21 10/18/21 0400   Number of days: 2       CVC Double Lumen Right Internal jugular (Active)   Site Assessment WDL 10/18/21 0400   Dressing Type Chlorhexidine sponge;Transparent 10/18/21 0400   Dressing Status clean;dry;intact 10/18/21 0400   Dressing Intervention new dressing;dressing  changed 10/17/21 0000   Dressing Change Due 10/24/21 10/18/21 0400   Line Necessity yes, meets criteria 10/18/21 0400   Brown - Status saline locked 10/18/21 0400   Brown - Cap Change Due 10/19/21 10/18/21 0400   White - Status infusing 10/18/21 0400   White - Cap Change Due 10/19/21 10/18/21 0400   Phlebitis Scale 0-->no symptoms 10/18/21 0400   Infiltration? no 10/18/21 0400   CVC Comment MAC 10/18/21 0400   Number of days: 2       Pulmonary Artery Catheter - Triple Lumen 10/16/21 1500 Right internal jugular vein (Active)   Dressing dressing dry and intact 10/18/21 0400   Securement secured with sutures 10/18/21 0400   PA Catheter Markings (cm) 47 10/18/21 0400   Phlebitis 0-->no symptoms 10/18/21 0400   Infiltration 0-->no symptoms 10/18/21 0400   Waveform normal 10/18/21 0400   Lumen A - Color WHITE 10/18/21 0400   Lumen A - Status transduced 10/18/21 0400   Lumen A - Cap Change Due 10/19/21 10/18/21 0400   Lumen B - Color YELLOW 10/18/21 0400   Lumen B - Status transduced 10/18/21 0400   Lumen C - Color BLUE 10/18/21 0400   Lumen C - Status saline locked 10/18/21 0400   Lumen C - Cap Change Due 10/19/21 10/18/21 0400   Number of days: 2     Data:     LABS    CMP:   Recent Labs   Lab 10/18/21  0701 10/18/21  0554 10/18/21  0407 10/18/21  0403 10/17/21  2358 10/17/21  2207 10/17/21  1542 10/17/21  1535 10/17/21  0458 10/17/21  0346   NA  --   --   --  145*  --  146*  --  144  --  146*   POTASSIUM  --   --   --  3.7  --  3.7  --  4.3  --  3.8   CHLORIDE  --   --   --  108  --  111*  --  107  --  109   CO2  --   --   --  30  --  29  --  31  --  26   ANIONGAP  --   --   --  7  --  6  --  6  --  11   * 151* 131* 147*   < > 144*   < > 188*   < > 141*   BUN  --   --   --  30  --  27  --  25  --  20   CR  --   --   --  1.56*  --  1.45*  --  1.50*  --  1.20   GFRESTIMATED  --   --   --  49*  --  53*  --  51*  --  67   ERIK  --   --   --  8.2*  --  7.7*  --  8.3*  --  7.9*   MAG  --   --   --  2.5*  --  2.2  --  2.2   --  2.2   PHOS  --   --   --  4.7*  --  5.2*  --  6.3*  --  5.3*   PROTTOTAL  --   --   --  3.7*  --  4.1*  --  4.5*  --  3.7*   ALBUMIN  --   --   --  1.7*  --  1.6*  --  2.0*  --  1.3*   BILITOTAL  --   --   --  0.8  --  0.9  --  1.1  --  1.1   ALKPHOS  --   --   --  61  --  57  --  60  --  52   AST  --   --   --  377*  --  380*  --  550*  --  1,246*   ALT  --   --   --  1,015*  --  994*  --  1,185*  --  1,550*    < > = values in this interval not displayed.     CBC:   Recent Labs   Lab 10/18/21  0403 10/17/21  1212 10/17/21  0346 10/16/21  2032 10/16/21  1535 10/16/21  1535   WBC 44.5*  --  39.9* 69.5*  --  60.0*   RBC 2.93*  --  3.71* 4.06*  --  3.91*   HGB 8.7* 9.5* 11.2* 12.2*   < > 11.8*   HCT 26.5*  --  32.8* 37.8*  --  36.4*   MCV 90  --  88 93  --  93   MCH 29.7  --  30.2 30.0  --  30.2   MCHC 32.8  --  34.1 32.3  --  32.4   RDW 17.0*  --  16.6* 16.5*  --  16.8*     --  186 250  --  238    < > = values in this interval not displayed.       INR:   Recent Labs   Lab 10/18/21  0403 10/17/21  1535 10/17/21  0346 10/16/21  2032   INR 2.08* 1.75* 1.77* 1.54*       Glucose:   Recent Labs   Lab 10/18/21  0701 10/18/21  0554 10/18/21  0407 10/18/21  0403 10/18/21  0310 10/18/21  0122   * 151* 131* 147* 160* 174*       Blood Gas:   Recent Labs   Lab 10/18/21  0404 10/18/21  0403 10/17/21  2359 10/17/21  2000 10/17/21  2000   PHV  --  7.44* 7.43  --  7.44*   PCO2V  --  45 47  --  46   PO2V  --  28 30  --  29   HCO3V  --  31* 32*  --  31*   LORI  --  5.7* 6.5*  --  6.1*   O2PER 60 60 70  70   < > 70    < > = values in this interval not displayed.       Culture Data No results for input(s): CULT in the last 168 hours.    Virology Data: No results found for: INFLUA, FLUAH1, FLUAH3, JW9325, IFLUB, RSVA, RSVB, PIV1, PIV2, PIV3, HMPV, HRVS, ADVBE, ADVC    Historical CMV results (last 3 of prior testing):  No results found for: CMVQNT  No results found for: CMVLOG    Urine Studies    Recent Labs   Lab Test  10/17/21  1642 10/17/21  1041   URINEPH 5.0 5.0   NITRITE Negative Negative   LEUKEST Negative Trace*   WBCU 25* 44*       Most Recent Breeze Pulmonary Function Testing (FVC/FEV1 only)  No results found for: 20002  No results found for: 20003  No results found for: 20015  No results found for: 20016    IMAGING    Recent Results (from the past 48 hour(s))   XR Chest Port 1 View    Narrative    EXAM: XR CHEST PORT 1 VIEW  10/16/2021 1:39 PM     HISTORY:  no       COMPARISON:  Chest x-ray 10/15/2021    FINDINGS:     Supine radiograph of the chest. Endotracheal tube tip projects over  the midthoracic trachea. Right internal jugular [place]-Sintia catheter tip  projects over the main pulmonary artery. Mediastinal surgical clips.  Mediastinal drain in place. Bibasilar and biapical chest tubes in  place. Central radiopacity corresponding to endotracheal blocker.    Trachea is midline. Cardiac silhouette is ill-defined. Diffuse mixed  opacities throughout the mid to upper lung zones. No significant  pleural effusion. No appreciable pneumothorax.      Impression    IMPRESSION:     1. No radiopaque surgical instrument identified within the  field-of-view. Focal radiopacity projecting over soft tissue of right  lateral chest wall likely external to the patient.  2. Post surgical chest with numerous support devices as described in  the report.  3. Mixed perihilar opacities suggestive of pulmonary edema and  atelectasis.    Findings in this case including focal radiopacity projecting over the  soft tissues of right lateral chest wall were communicated to the  operating team by Dr. Goldstein.    I have personally reviewed the examination and initial interpretation  and I agree with the findings.    SHANDRA CEVALLOS MD         SYSTEM ID:  [id number]   XR Chest Port 1 View    Narrative    EXAM: XR CHEST PORT 1 VIEW  10/16/2021 3:54 PM     HISTORY:  s/p bilateral lung txp       COMPARISON:  Earlier same day chest x-ray, 10/15/2021    FINDINGS:  AP radiograph of the chest. Postoperative changes of lung  transplant. Endotracheal tube projecting over the midthoracic trachea  with the tip approximately 2.2 cm cephalad to the shiv. Right IJ  [place]-Sintia catheter with tip overlying the main pulmonary artery. Right  arm PICC with tip overlying the superior cavoatrial junction. Stable  positioning of bilateral apical and bibasilar chest tubes. Stable  mediastinal drain.    Stable cardiomediastinal silhouette. Improved aeration to bilateral  upper lung fields. New consolidative opacity in the left midlung field  with hazy right perihilar opacities. No appreciable pneumothorax or  significant pleural effusion. Visualized upper abdomen is  unremarkable. Subcutaneous emphysema about the left greater than right  chest wall. No acute osseous abnormality.      Impression    IMPRESSION:  1. Postoperative changes of bilateral lung transplant with likely  shifting atelectasis most pronounced in the left midlung.  2. Endotracheal tube with tip projecting 2.2 cm cephalad to the  shiv. Additional support devices are unchanged as detailed above.    I have personally reviewed the examination and initial interpretation  and I agree with the findings.    ZHANNA DONG MD         SYSTEM ID:  [id number]   XR Abdomen Port 1 View    Narrative    EXAM: XR ABDOMEN PORT 1 VW  10/16/2021 4:08 PM     HISTORY:  Orogastric tube.       COMPARISON:  Earlier same day chest x-ray    FINDINGS: Supine radiograph of the abdomen.  Orogastric tube with tip  and side-port overlying the stomach. Nonspecific, nonobstructive bowel  gas pattern. No pneumatosis or portal venous gas. Partial  visualization of multiple bilateral chest tubes and mediastinal drain  which are completely evaluated on the comparison chest radiograph.  Rectal temperature probe. No acute osseous abnormality.      Impression    IMPRESSION: OG tube with tip and side port overlying the stomach. Non  obstructive bowel gas  pattern.    I have personally reviewed the examination and initial interpretation  and I agree with the findings.    ZHANNA DONG MD         SYSTEM ID:  [id number]   XR Chest Port 1 View    Narrative    Exam: XR CHEST PORT 1 VIEW, 10/17/2021 10:40 AM    Indication: Intubated    Comparison: 10/16/2021    Findings:   Endotracheal tube in the distal third of the esophagus approximately 2  cm above the shiv. Enteric tube tip and sidehole projected off the  film but at least to the level of the stomach. [place]-Sintia catheter tip  in the main right pulmonary artery. There are bibasilar chest tubes  and biapical chest tubes in place. Stable mediastinal chest tube Right  arm PICC tip in the low superior vena cava.     Stable cardiomegaly. Pulmonary vasculature obscured by diffuse mixed  opacities throughout both lungs.      Impression    Impression:   1. Stable support devices.  2. Slight increase in the diffuse mixed opacities throughout both  lungs, suggesting edema.    MAURICIO NAVAS MD         SYSTEM ID:  [id number]   XR Chest Port 1 View    Narrative    Exam: XR CHEST PORT 1 VIEW, 10/17/2021 12:33 PM    Indication: s/p lung transplant - increased oxygen requirements    Comparison: 10/17/2021    Findings:   Endotracheal tube in the distal third of the trachea. Enteric tube tip  and sidehole project off the film but at least to the level of the  stomach. [place]-Sintia catheter tip in the Main pulmonary artery. Biapical  chest tubes and bibasilar chest tubes remain in place. Single  mediastinal tube is unchanged.    Heart within normal limits in size. Pulmonary vasculature obscured by  diffuse mixed opacities throughout both lungs.      Impression    Impression:   1. Stable support devices  2. Diffuse mixed opacities throughout both lungs suggesting edema or  infection are stable.    MAURICIO NAVAS MD         SYSTEM ID:  [id number]

## 2021-10-18 NOTE — PLAN OF CARE
D: POD 2 lung transplant. Intubated and sedated. Chest tubes low output. Low UO, average 20-50/hr, with 60 IV lasix and 1mg bumex. EPI, levo, propofol, insulin continued, started Amio for AFIB RVR, Precedex, and Nimbex. For medication titrations see MAR. Q4H Piyush, and Q4H train of four.  Pt aroused easily, even when not stimulated. During these times his BP increases dramatically, as well as work of breathing and O2 sats drop. Does not follow commands during these awake periods. Paralytic and increased dose of precedex and propofol needed for adequate sedation. RASS now at -3/-4.  Providers notified and aware of pt status changes. Pt T/R q2h, oral cares and suctioning provided. Family at bedside today.

## 2021-10-18 NOTE — PLAN OF CARE
No change in neuro status. Prop @ 40 & Fent @ 100. ST. Epi @ 0.08, nipride @ 0.5. CVP 10, PA 34/24, CI 2.1, SVR 1185, SvO2 45%. K+ 3.7 replaced. CMV, , RR 20, PEEP 12, FiO2 60%. Nitric @ 20. OG w/ trickle feeds 20mL/hr. Decreased metzger output, ~15mL/hr - crosscover aware. 5 CTs to sxn. Insulin gtt @ 5 units/hr. CVTS primary.

## 2021-10-18 NOTE — PROGRESS NOTES
Care Management Follow Up    Length of Stay (days): 43    Expected Discharge Date: TBD     Patient plan of care discussed at interdisciplinary rounds: Yes    Anticipated Discharge Disposition:  TBD     Anticipated Discharge Services:  TBD  Anticipated Discharge DME:  TBD    Additional Information:  Pt had Lung transplant on 10/16/21 and transferred to ICU.  Pt is in ICU vented and sedated.  RNCC will cont to follow plan of care.      Addendum:  Received a call from pt [facility] , Pedro. Jenn stated we can contact her for discharge assistance need or resource.  Jenn contact number #[phone], ex [phone].      Jennifer Pate, RN, PHN, BSN  4A and 4E/ ICU  Care Coordinator  Phone: [phone]  Pager: [phone]    To get in touch with weekend & Holiday on call RN Care Coordinator  Page [phone] or Care Coordinator Job code/pager- [phone]

## 2021-10-18 NOTE — PHARMACY-VANCOMYCIN DOSING SERVICE
Pharmacy Vancomycin Note  Date of Service 2021  Patient's  1965   56 year old, male    Indication: Surgical Prophylaxis - donor cultures NGTD  Day of Therapy: 3  Current vancomycin regimen:  Intermittent based on levels  Current vancomycin monitoring method: Trough (Method 2 = manual dose calculation)  Current vancomycin therapeutic monitoring goal: 10-15 mg/L    Current estimated CrCl = Estimated Creatinine Clearance: 48.4 mL/min (A) (based on SCr of 1.56 mg/dL (H)).    Creatinine for last 3 days  10/15/2021:  9:07 AM Creatinine 0.65 mg/dL  10/16/2021:  3:35 PM Creatinine 0.93 mg/dL;  8:32 PM Creatinine 1.31 mg/dL  10/17/2021:  3:46 AM Creatinine 1.20 mg/dL;  3:35 PM Creatinine 1.50 mg/dL; 10:07 PM Creatinine 1.45 mg/dL  10/18/2021:  4:03 AM Creatinine 1.56 mg/dL    Recent Vancomycin Levels (past 3 days)  10/18/2021:  4:03 AM Vancomycin 12.6 mg/L    Vancomycin IV Administrations (past 72 hours)                   vancomycin (VANCOCIN) 1000 mg in dextrose 5% 200 mL PREMIX (mg) 1,000 mg New Bag 10/17/21 0556     1,000 mg New Bag 10/16/21 1814    vancomycin (VANCOCIN) 1000 mg in dextrose 5% 200 mL PREMIX (mg) 1,000 mg New Bag 10/16/21 0604                Nephrotoxins and other renal medications (From now, onward)    Start     Dose/Rate Route Frequency Ordered Stop    10/18/21 0900  furosemide (LASIX) injection 20 mg      20 mg  over 1-3 Minutes Intravenous ONCE 10/18/21 0835      10/17/21 1531  vancomycin place morrow - receiving intermittent dosing      1 each Does not apply SEE ADMIN INSTRUCTIONS 10/17/21 1534 10/18/21 1830    10/17/21 0800  tacrolimus (GENERIC EQUIVALENT) PO capsule for SL USE 1 mg      1 mg Sublingual 2 TIMES DAILY. 10/16/21 1533      10/16/21 1656  acyclovir (ZOVIRAX) suspension 400 mg     Note to Pharmacy: Please change start date of acyclovir to POD #2 for HSV antibody negative patients.    400 mg Oral or NG Tube 2 TIMES DAILY 10/16/21 1702 11/15/21 1959             Contrast  Orders - past 72 hours (72h ago, onward)    None          Interpretation of levels and current regimen:  Vancomycin level is reflective of therapeutic level    Has serum creatinine changed greater than 50% in last 72 hours: Yes    Urine output:  diminished urine output    Renal Function: Stable    Plan:  1. Therapy discontinued after 48 hours per protocol    Leela Tong RP

## 2021-10-19 ENCOUNTER — APPOINTMENT (OUTPATIENT)
Dept: ULTRASOUND IMAGING | Facility: CLINIC | Age: 56
End: 2021-10-19
Attending: STUDENT IN AN ORGANIZED HEALTH CARE EDUCATION/TRAINING PROGRAM
Payer: COMMERCIAL

## 2021-10-19 ENCOUNTER — APPOINTMENT (OUTPATIENT)
Dept: GENERAL RADIOLOGY | Facility: CLINIC | Age: 56
End: 2021-10-19
Attending: STUDENT IN AN ORGANIZED HEALTH CARE EDUCATION/TRAINING PROGRAM
Payer: COMMERCIAL

## 2021-10-19 LAB
ABO/RH(D): NORMAL
ALBUMIN SERPL-MCNC: 1.3 G/DL (ref 3.4–5)
ALBUMIN SERPL-MCNC: 1.4 G/DL (ref 3.4–5)
ALBUMIN SERPL-MCNC: 1.5 G/DL (ref 3.4–5)
ALP SERPL-CCNC: 142 U/L (ref 40–150)
ALP SERPL-CCNC: 178 U/L (ref 40–150)
ALP SERPL-CCNC: 182 U/L (ref 40–150)
ALT SERPL W P-5'-P-CCNC: 1016 U/L (ref 0–70)
ALT SERPL W P-5'-P-CCNC: 1064 U/L (ref 0–70)
ALT SERPL W P-5'-P-CCNC: 1096 U/L (ref 0–70)
ANION GAP SERPL CALCULATED.3IONS-SCNC: 6 MMOL/L (ref 3–14)
ANION GAP SERPL CALCULATED.3IONS-SCNC: 8 MMOL/L (ref 3–14)
ANION GAP SERPL CALCULATED.3IONS-SCNC: 9 MMOL/L (ref 3–14)
ANTIBODY SCREEN: NEGATIVE
AST SERPL W P-5'-P-CCNC: 207 U/L (ref 0–45)
AST SERPL W P-5'-P-CCNC: 254 U/L (ref 0–45)
AST SERPL W P-5'-P-CCNC: 308 U/L (ref 0–45)
ATRIAL RATE - MUSE: 90 BPM
BACTERIA SPEC CULT: ABNORMAL
BACTERIA SPEC CULT: ABNORMAL
BACTERIA SPEC CULT: NO GROWTH
BACTERIA SPT CULT: NORMAL
BACTERIA SPT CULT: NORMAL
BASE EXCESS BLDA CALC-SCNC: 1.6 MMOL/L (ref -9–1.8)
BASE EXCESS BLDA CALC-SCNC: 2.7 MMOL/L (ref -9–1.8)
BASE EXCESS BLDA CALC-SCNC: 3.8 MMOL/L (ref -9–1.8)
BASE EXCESS BLDA CALC-SCNC: 3.9 MMOL/L (ref -9–1.8)
BASE EXCESS BLDA CALC-SCNC: 4.4 MMOL/L (ref -9–1.8)
BASE EXCESS BLDA CALC-SCNC: 4.6 MMOL/L (ref -9–1.8)
BASE EXCESS BLDA CALC-SCNC: 7 MMOL/L (ref -9–1.8)
BASE EXCESS BLDV CALC-SCNC: 2.8 MMOL/L (ref -7.7–1.9)
BASE EXCESS BLDV CALC-SCNC: 4.6 MMOL/L (ref -7.7–1.9)
BASE EXCESS BLDV CALC-SCNC: 5.3 MMOL/L (ref -7.7–1.9)
BASE EXCESS BLDV CALC-SCNC: 5.4 MMOL/L (ref -7.7–1.9)
BASE EXCESS BLDV CALC-SCNC: 5.6 MMOL/L (ref -7.7–1.9)
BASE EXCESS BLDV CALC-SCNC: 9.8 MMOL/L (ref -7.7–1.9)
BASOPHILS # BLD AUTO: 0.1 10E3/UL (ref 0–0.2)
BASOPHILS NFR BLD AUTO: 0 %
BILIRUB DIRECT SERPL-MCNC: 0.2 MG/DL (ref 0–0.2)
BILIRUB DIRECT SERPL-MCNC: 0.3 MG/DL (ref 0–0.2)
BILIRUB DIRECT SERPL-MCNC: 0.3 MG/DL (ref 0–0.2)
BILIRUB SERPL-MCNC: 0.5 MG/DL (ref 0.2–1.3)
BILIRUB SERPL-MCNC: 0.5 MG/DL (ref 0.2–1.3)
BILIRUB SERPL-MCNC: 0.6 MG/DL (ref 0.2–1.3)
BLD PROD TYP BPU: NORMAL
BLOOD COMPONENT TYPE: NORMAL
BUN SERPL-MCNC: 48 MG/DL (ref 7–30)
BUN SERPL-MCNC: 48 MG/DL (ref 7–30)
BUN SERPL-MCNC: 50 MG/DL (ref 7–30)
CALCIUM SERPL-MCNC: 7.4 MG/DL (ref 8.5–10.1)
CALCIUM SERPL-MCNC: 7.5 MG/DL (ref 8.5–10.1)
CALCIUM SERPL-MCNC: 7.9 MG/DL (ref 8.5–10.1)
CHLORIDE BLD-SCNC: 106 MMOL/L (ref 94–109)
CHLORIDE BLD-SCNC: 108 MMOL/L (ref 94–109)
CHLORIDE BLD-SCNC: 109 MMOL/L (ref 94–109)
CO2 SERPL-SCNC: 27 MMOL/L (ref 20–32)
CO2 SERPL-SCNC: 28 MMOL/L (ref 20–32)
CO2 SERPL-SCNC: 29 MMOL/L (ref 20–32)
CODING SYSTEM: NORMAL
CREAT SERPL-MCNC: 1.76 MG/DL (ref 0.66–1.25)
CREAT SERPL-MCNC: 1.87 MG/DL (ref 0.66–1.25)
CREAT SERPL-MCNC: 2.05 MG/DL (ref 0.66–1.25)
CROSSMATCH: NORMAL
DIASTOLIC BLOOD PRESSURE - MUSE: NORMAL MMHG
EOSINOPHIL # BLD AUTO: 0 10E3/UL (ref 0–0.7)
EOSINOPHIL NFR BLD AUTO: 0 %
ERYTHROCYTE [DISTWIDTH] IN BLOOD BY AUTOMATED COUNT: 16.4 % (ref 10–15)
FLUAV H1 2009 PAND RNA SPEC QL NAA+PROBE: NEGATIVE
FLUAV H1 RNA SPEC QL NAA+PROBE: NEGATIVE
FLUAV H3 RNA SPEC QL NAA+PROBE: NEGATIVE
FLUAV RNA SPEC QL NAA+PROBE: NEGATIVE
FLUBV RNA SPEC QL NAA+PROBE: NEGATIVE
GFR SERPL CREATININE-BSD FRML MDRD: 35 ML/MIN/1.73M2
GFR SERPL CREATININE-BSD FRML MDRD: 39 ML/MIN/1.73M2
GFR SERPL CREATININE-BSD FRML MDRD: 42 ML/MIN/1.73M2
GLUCOSE BLD-MCNC: 146 MG/DL (ref 70–99)
GLUCOSE BLD-MCNC: 169 MG/DL (ref 70–99)
GLUCOSE BLD-MCNC: 181 MG/DL (ref 70–99)
GLUCOSE BLDC GLUCOMTR-MCNC: 121 MG/DL (ref 70–99)
GLUCOSE BLDC GLUCOMTR-MCNC: 127 MG/DL (ref 70–99)
GLUCOSE BLDC GLUCOMTR-MCNC: 133 MG/DL (ref 70–99)
GLUCOSE BLDC GLUCOMTR-MCNC: 133 MG/DL (ref 70–99)
GLUCOSE BLDC GLUCOMTR-MCNC: 142 MG/DL (ref 70–99)
GLUCOSE BLDC GLUCOMTR-MCNC: 144 MG/DL (ref 70–99)
GLUCOSE BLDC GLUCOMTR-MCNC: 145 MG/DL (ref 70–99)
GLUCOSE BLDC GLUCOMTR-MCNC: 146 MG/DL (ref 70–99)
GLUCOSE BLDC GLUCOMTR-MCNC: 148 MG/DL (ref 70–99)
GLUCOSE BLDC GLUCOMTR-MCNC: 148 MG/DL (ref 70–99)
GLUCOSE BLDC GLUCOMTR-MCNC: 151 MG/DL (ref 70–99)
GLUCOSE BLDC GLUCOMTR-MCNC: 153 MG/DL (ref 70–99)
GLUCOSE BLDC GLUCOMTR-MCNC: 155 MG/DL (ref 70–99)
GLUCOSE BLDC GLUCOMTR-MCNC: 156 MG/DL (ref 70–99)
GLUCOSE BLDC GLUCOMTR-MCNC: 156 MG/DL (ref 70–99)
GLUCOSE BLDC GLUCOMTR-MCNC: 163 MG/DL (ref 70–99)
GLUCOSE BLDC GLUCOMTR-MCNC: 165 MG/DL (ref 70–99)
GLUCOSE BLDC GLUCOMTR-MCNC: 166 MG/DL (ref 70–99)
GLUCOSE BLDC GLUCOMTR-MCNC: 168 MG/DL (ref 70–99)
GLUCOSE BLDC GLUCOMTR-MCNC: 180 MG/DL (ref 70–99)
GLUCOSE BLDC GLUCOMTR-MCNC: 184 MG/DL (ref 70–99)
GLUCOSE BLDC GLUCOMTR-MCNC: 185 MG/DL (ref 70–99)
GRAM STAIN RESULT: NORMAL
HADV DNA SPEC QL NAA+PROBE: NEGATIVE
HADV DNA SPEC QL NAA+PROBE: NEGATIVE
HBV DNA SERPL NAA+PROBE-ACNC: NOT DETECTED IU/ML
HCO3 BLD-SCNC: 26 MMOL/L (ref 21–28)
HCO3 BLD-SCNC: 28 MMOL/L (ref 21–28)
HCO3 BLD-SCNC: 28 MMOL/L (ref 21–28)
HCO3 BLD-SCNC: 29 MMOL/L (ref 21–28)
HCO3 BLD-SCNC: 29 MMOL/L (ref 21–28)
HCO3 BLD-SCNC: 30 MMOL/L (ref 21–28)
HCO3 BLD-SCNC: 32 MMOL/L (ref 21–28)
HCO3 BLDV-SCNC: 29 MMOL/L (ref 21–28)
HCO3 BLDV-SCNC: 30 MMOL/L (ref 21–28)
HCO3 BLDV-SCNC: 31 MMOL/L (ref 21–28)
HCO3 BLDV-SCNC: 32 MMOL/L (ref 21–28)
HCO3 BLDV-SCNC: 32 MMOL/L (ref 21–28)
HCO3 BLDV-SCNC: 36 MMOL/L (ref 21–28)
HCT VFR BLD AUTO: 23.2 % (ref 40–53)
HGB BLD-MCNC: 7.6 G/DL (ref 13.3–17.7)
HGB BLD-MCNC: 7.6 G/DL (ref 13.3–17.7)
HGB BLD-MCNC: 9 G/DL (ref 13.3–17.7)
HIV1 RNA # PLAS NAA DL=20: NOT DETECTED COPIES/ML
HMPV RNA SPEC QL NAA+PROBE: NEGATIVE
HPIV1 RNA SPEC QL NAA+PROBE: NEGATIVE
HPIV2 RNA SPEC QL NAA+PROBE: NEGATIVE
HPIV3 RNA SPEC QL NAA+PROBE: NEGATIVE
IMM GRANULOCYTES # BLD: 1.4 10E3/UL
IMM GRANULOCYTES NFR BLD: 4 %
INR PPP: 1.3 (ref 0.85–1.15)
INR PPP: 1.41 (ref 0.85–1.15)
INTERPRETATION ECG - MUSE: NORMAL
ISSUE DATE AND TIME: NORMAL
LACTATE SERPL-SCNC: 2 MMOL/L (ref 0.7–2)
LACTATE SERPL-SCNC: 2 MMOL/L (ref 0.7–2)
LACTATE SERPL-SCNC: 2.1 MMOL/L (ref 0.7–2)
LACTATE SERPL-SCNC: 2.3 MMOL/L (ref 0.7–2)
LACTATE SERPL-SCNC: 2.7 MMOL/L (ref 0.7–2)
LACTATE SERPL-SCNC: 2.9 MMOL/L (ref 0.7–2)
LYMPHOCYTES # BLD AUTO: 1.2 10E3/UL (ref 0.8–5.3)
LYMPHOCYTES NFR BLD AUTO: 4 %
MAGNESIUM SERPL-MCNC: 2.4 MG/DL (ref 1.6–2.3)
MAGNESIUM SERPL-MCNC: 2.7 MG/DL (ref 1.6–2.3)
MCH RBC QN AUTO: 30.8 PG (ref 26.5–33)
MCHC RBC AUTO-ENTMCNC: 32.8 G/DL (ref 31.5–36.5)
MCV RBC AUTO: 94 FL (ref 78–100)
MONOCYTES # BLD AUTO: 1 10E3/UL (ref 0–1.3)
MONOCYTES NFR BLD AUTO: 3 %
NEUTROPHILS # BLD AUTO: 28.8 10E3/UL (ref 1.6–8.3)
NEUTROPHILS NFR BLD AUTO: 89 %
NRBC # BLD AUTO: 0.3 10E3/UL
NRBC BLD AUTO-RTO: 1 /100
O2/TOTAL GAS SETTING VFR VENT: 40 %
O2/TOTAL GAS SETTING VFR VENT: 50 %
OXYHGB MFR BLDV: 41 % (ref 70–75)
OXYHGB MFR BLDV: 41 % (ref 70–75)
OXYHGB MFR BLDV: 54 % (ref 70–75)
OXYHGB MFR BLDV: 54 % (ref 70–75)
OXYHGB MFR BLDV: 56 % (ref 70–75)
OXYHGB MFR BLDV: 57 % (ref 70–75)
P AXIS - MUSE: 44 DEGREES
PCO2 BLD: 42 MM HG (ref 35–45)
PCO2 BLD: 42 MM HG (ref 35–45)
PCO2 BLD: 43 MM HG (ref 35–45)
PCO2 BLD: 45 MM HG (ref 35–45)
PCO2 BLD: 48 MM HG (ref 35–45)
PCO2 BLD: 49 MM HG (ref 35–45)
PCO2 BLD: 50 MM HG (ref 35–45)
PCO2 BLDV: 52 MM HG (ref 40–50)
PCO2 BLDV: 52 MM HG (ref 40–50)
PCO2 BLDV: 55 MM HG (ref 40–50)
PCO2 BLDV: 55 MM HG (ref 40–50)
PCO2 BLDV: 56 MM HG (ref 40–50)
PCO2 BLDV: 59 MM HG (ref 40–50)
PH BLD: 7.38 [PH] (ref 7.35–7.45)
PH BLD: 7.39 [PH] (ref 7.35–7.45)
PH BLD: 7.41 [PH] (ref 7.35–7.45)
PH BLD: 7.42 [PH] (ref 7.35–7.45)
PH BLD: 7.43 [PH] (ref 7.35–7.45)
PH BLD: 7.44 [PH] (ref 7.35–7.45)
PH BLD: 7.44 [PH] (ref 7.35–7.45)
PH BLDV: 7.33 [PH] (ref 7.32–7.43)
PH BLDV: 7.34 [PH] (ref 7.32–7.43)
PH BLDV: 7.37 [PH] (ref 7.32–7.43)
PH BLDV: 7.38 [PH] (ref 7.32–7.43)
PH BLDV: 7.39 [PH] (ref 7.32–7.43)
PH BLDV: 7.42 [PH] (ref 7.32–7.43)
PHOSPHATE SERPL-MCNC: 4.5 MG/DL (ref 2.5–4.5)
PHOSPHATE SERPL-MCNC: 5.8 MG/DL (ref 2.5–4.5)
PLAT MORPH BLD: ABNORMAL
PLATELET # BLD AUTO: 126 10E3/UL (ref 150–450)
PO2 BLD: 112 MM HG (ref 80–105)
PO2 BLD: 114 MM HG (ref 80–105)
PO2 BLD: 126 MM HG (ref 80–105)
PO2 BLD: 130 MM HG (ref 80–105)
PO2 BLD: 76 MM HG (ref 80–105)
PO2 BLD: 84 MM HG (ref 80–105)
PO2 BLD: 95 MM HG (ref 80–105)
PO2 BLDV: 27 MM HG (ref 25–47)
PO2 BLDV: 27 MM HG (ref 25–47)
PO2 BLDV: 32 MM HG (ref 25–47)
PO2 BLDV: 33 MM HG (ref 25–47)
PO2 BLDV: 33 MM HG (ref 25–47)
PO2 BLDV: 35 MM HG (ref 25–47)
POLYCHROMASIA BLD QL SMEAR: SLIGHT
POTASSIUM BLD-SCNC: 4 MMOL/L (ref 3.4–5.3)
POTASSIUM BLD-SCNC: 4.1 MMOL/L (ref 3.4–5.3)
POTASSIUM BLD-SCNC: 4.2 MMOL/L (ref 3.4–5.3)
PR INTERVAL - MUSE: 114 MS
PROT SERPL-MCNC: 4.1 G/DL (ref 6.8–8.8)
PROT SERPL-MCNC: 4.1 G/DL (ref 6.8–8.8)
PROT SERPL-MCNC: 4.2 G/DL (ref 6.8–8.8)
QRS DURATION - MUSE: 64 MS
QT - MUSE: 416 MS
QTC - MUSE: 508 MS
R AXIS - MUSE: 48 DEGREES
RBC # BLD AUTO: 2.47 10E6/UL (ref 4.4–5.9)
RBC MORPH BLD: ABNORMAL
RHINOVIRUS RNA SPEC QL NAA+PROBE: NEGATIVE
RSV RNA SPEC QL NAA+PROBE: NEGATIVE
RSV RNA SPEC QL NAA+PROBE: NEGATIVE
SA 2 CELL: NORMAL
SA 2 TEST METHOD: NORMAL
SA2 HI RISK ABY: NORMAL
SA2 MOD RISK ABY: NORMAL
SODIUM SERPL-SCNC: 142 MMOL/L (ref 133–144)
SODIUM SERPL-SCNC: 144 MMOL/L (ref 133–144)
SODIUM SERPL-SCNC: 144 MMOL/L (ref 133–144)
SPECIMEN EXPIRATION DATE: NORMAL
SYSTOLIC BLOOD PRESSURE - MUSE: NORMAL MMHG
T AXIS - MUSE: 46 DEGREES
TACROLIMUS BLD-MCNC: <3 UG/L (ref 5–15)
TME LAST DOSE: ABNORMAL H
TME LAST DOSE: ABNORMAL H
TRIGL SERPL-MCNC: 458 MG/DL
UNACCEPTABLE ANTIGENS: NORMAL
UNIT ABO/RH: NORMAL
UNIT NUMBER: NORMAL
UNIT STATUS: NORMAL
UNIT TYPE ISBT: 6200
UNOS CPRA: 0
VENTRICULAR RATE- MUSE: 90 BPM
WBC # BLD AUTO: 32.4 10E3/UL (ref 4–11)
ZZZSA 2 COMMENTS: NORMAL

## 2021-10-19 PROCEDURE — 250N000011 HC RX IP 250 OP 636: Performed by: THORACIC SURGERY (CARDIOTHORACIC VASCULAR SURGERY)

## 2021-10-19 PROCEDURE — 99291 CRITICAL CARE FIRST HOUR: CPT | Mod: 24 | Performed by: ANESTHESIOLOGY

## 2021-10-19 PROCEDURE — 99207 PR NON-BILLABLE SERV PER CHARTING: CPT | Performed by: PHYSICIAN ASSISTANT

## 2021-10-19 PROCEDURE — 84450 TRANSFERASE (AST) (SGOT): CPT | Performed by: INTERNAL MEDICINE

## 2021-10-19 PROCEDURE — 94003 VENT MGMT INPAT SUBQ DAY: CPT

## 2021-10-19 PROCEDURE — 85610 PROTHROMBIN TIME: CPT | Performed by: INTERNAL MEDICINE

## 2021-10-19 PROCEDURE — 250N000013 HC RX MED GY IP 250 OP 250 PS 637: Performed by: STUDENT IN AN ORGANIZED HEALTH CARE EDUCATION/TRAINING PROGRAM

## 2021-10-19 PROCEDURE — 250N000009 HC RX 250: Performed by: ANESTHESIOLOGY

## 2021-10-19 PROCEDURE — 250N000011 HC RX IP 250 OP 636: Performed by: ANESTHESIOLOGY

## 2021-10-19 PROCEDURE — 250N000013 HC RX MED GY IP 250 OP 250 PS 637: Performed by: ANESTHESIOLOGY

## 2021-10-19 PROCEDURE — 83605 ASSAY OF LACTIC ACID: CPT | Performed by: ANESTHESIOLOGY

## 2021-10-19 PROCEDURE — 84155 ASSAY OF PROTEIN SERUM: CPT | Performed by: INTERNAL MEDICINE

## 2021-10-19 PROCEDURE — 250N000011 HC RX IP 250 OP 636: Performed by: NURSE PRACTITIONER

## 2021-10-19 PROCEDURE — 83735 ASSAY OF MAGNESIUM: CPT | Performed by: INTERNAL MEDICINE

## 2021-10-19 PROCEDURE — 999N000015 HC STATISTIC ARTERIAL MONITORING DAILY

## 2021-10-19 PROCEDURE — 84100 ASSAY OF PHOSPHORUS: CPT | Performed by: INTERNAL MEDICINE

## 2021-10-19 PROCEDURE — 85025 COMPLETE CBC W/AUTO DIFF WBC: CPT | Performed by: STUDENT IN AN ORGANIZED HEALTH CARE EDUCATION/TRAINING PROGRAM

## 2021-10-19 PROCEDURE — 94799 UNLISTED PULMONARY SVC/PX: CPT

## 2021-10-19 PROCEDURE — 82805 BLOOD GASES W/O2 SATURATION: CPT | Performed by: INTERNAL MEDICINE

## 2021-10-19 PROCEDURE — 94640 AIRWAY INHALATION TREATMENT: CPT | Mod: 76

## 2021-10-19 PROCEDURE — C9113 INJ PANTOPRAZOLE SODIUM, VIA: HCPCS | Performed by: NURSE PRACTITIONER

## 2021-10-19 PROCEDURE — 93971 EXTREMITY STUDY: CPT | Mod: RT

## 2021-10-19 PROCEDURE — 250N000011 HC RX IP 250 OP 636: Performed by: STUDENT IN AN ORGANIZED HEALTH CARE EDUCATION/TRAINING PROGRAM

## 2021-10-19 PROCEDURE — 84478 ASSAY OF TRIGLYCERIDES: CPT | Performed by: THORACIC SURGERY (CARDIOTHORACIC VASCULAR SURGERY)

## 2021-10-19 PROCEDURE — 82248 BILIRUBIN DIRECT: CPT | Performed by: INTERNAL MEDICINE

## 2021-10-19 PROCEDURE — 250N000011 HC RX IP 250 OP 636: Performed by: SURGERY

## 2021-10-19 PROCEDURE — 250N000009 HC RX 250: Performed by: NURSE PRACTITIONER

## 2021-10-19 PROCEDURE — 250N000012 HC RX MED GY IP 250 OP 636 PS 637: Performed by: PHYSICIAN ASSISTANT

## 2021-10-19 PROCEDURE — 86900 BLOOD TYPING SEROLOGIC ABO: CPT | Performed by: STUDENT IN AN ORGANIZED HEALTH CARE EDUCATION/TRAINING PROGRAM

## 2021-10-19 PROCEDURE — 71045 X-RAY EXAM CHEST 1 VIEW: CPT

## 2021-10-19 PROCEDURE — 94668 MNPJ CHEST WALL SBSQ: CPT

## 2021-10-19 PROCEDURE — 82803 BLOOD GASES ANY COMBINATION: CPT | Performed by: SURGERY

## 2021-10-19 PROCEDURE — 93971 EXTREMITY STUDY: CPT | Mod: 26 | Performed by: RADIOLOGY

## 2021-10-19 PROCEDURE — 80197 ASSAY OF TACROLIMUS: CPT | Performed by: NURSE PRACTITIONER

## 2021-10-19 PROCEDURE — 250N000009 HC RX 250: Performed by: STUDENT IN AN ORGANIZED HEALTH CARE EDUCATION/TRAINING PROGRAM

## 2021-10-19 PROCEDURE — 82803 BLOOD GASES ANY COMBINATION: CPT | Performed by: INTERNAL MEDICINE

## 2021-10-19 PROCEDURE — P9016 RBC LEUKOCYTES REDUCED: HCPCS | Performed by: STUDENT IN AN ORGANIZED HEALTH CARE EDUCATION/TRAINING PROGRAM

## 2021-10-19 PROCEDURE — 258N000003 HC RX IP 258 OP 636: Performed by: ANESTHESIOLOGY

## 2021-10-19 PROCEDURE — 94640 AIRWAY INHALATION TREATMENT: CPT

## 2021-10-19 PROCEDURE — 71045 X-RAY EXAM CHEST 1 VIEW: CPT | Mod: 26 | Performed by: RADIOLOGY

## 2021-10-19 PROCEDURE — 85018 HEMOGLOBIN: CPT | Performed by: ANESTHESIOLOGY

## 2021-10-19 PROCEDURE — 999N000157 HC STATISTIC RCP TIME EA 10 MIN

## 2021-10-19 PROCEDURE — 84132 ASSAY OF SERUM POTASSIUM: CPT | Performed by: STUDENT IN AN ORGANIZED HEALTH CARE EDUCATION/TRAINING PROGRAM

## 2021-10-19 PROCEDURE — 999N000045 HC STATISTIC DAILY SWAN MONITORING

## 2021-10-19 PROCEDURE — 99233 SBSQ HOSP IP/OBS HIGH 50: CPT | Mod: 24 | Performed by: INTERNAL MEDICINE

## 2021-10-19 PROCEDURE — 85018 HEMOGLOBIN: CPT | Performed by: STUDENT IN AN ORGANIZED HEALTH CARE EDUCATION/TRAINING PROGRAM

## 2021-10-19 PROCEDURE — 71045 X-RAY EXAM CHEST 1 VIEW: CPT | Mod: 76

## 2021-10-19 PROCEDURE — 86923 COMPATIBILITY TEST ELECTRIC: CPT | Performed by: STUDENT IN AN ORGANIZED HEALTH CARE EDUCATION/TRAINING PROGRAM

## 2021-10-19 PROCEDURE — 250N000012 HC RX MED GY IP 250 OP 636 PS 637: Performed by: NURSE PRACTITIONER

## 2021-10-19 PROCEDURE — 80053 COMPREHEN METABOLIC PANEL: CPT | Performed by: INTERNAL MEDICINE

## 2021-10-19 PROCEDURE — 999N000155 HC STATISTIC RAPCV CVP MONITORING

## 2021-10-19 PROCEDURE — 250N000013 HC RX MED GY IP 250 OP 250 PS 637: Performed by: NURSE PRACTITIONER

## 2021-10-19 PROCEDURE — 200N000002 HC R&B ICU UMMC

## 2021-10-19 RX ORDER — BUMETANIDE 0.25 MG/ML
2 INJECTION INTRAMUSCULAR; INTRAVENOUS ONCE
Status: COMPLETED | OUTPATIENT
Start: 2021-10-19 | End: 2021-10-19

## 2021-10-19 RX ORDER — BUMETANIDE 0.25 MG/ML
1 INJECTION INTRAMUSCULAR; INTRAVENOUS ONCE
Status: COMPLETED | OUTPATIENT
Start: 2021-10-19 | End: 2021-10-19

## 2021-10-19 RX ORDER — TACROLIMUS 0.5 MG/1
4 CAPSULE ORAL
Status: DISCONTINUED | OUTPATIENT
Start: 2021-10-19 | End: 2021-10-20

## 2021-10-19 RX ORDER — POLYETHYLENE GLYCOL 3350 17 G/17G
17 POWDER, FOR SOLUTION ORAL 2 TIMES DAILY
Status: DISCONTINUED | OUTPATIENT
Start: 2021-10-19 | End: 2021-10-23

## 2021-10-19 RX ORDER — CEFTAZIDIME 2 G/1
2 INJECTION, POWDER, FOR SOLUTION INTRAVENOUS EVERY 12 HOURS
Status: COMPLETED | OUTPATIENT
Start: 2021-10-19 | End: 2021-10-20

## 2021-10-19 RX ADMIN — LEVALBUTEROL HYDROCHLORIDE 1.25 MG: 1.25 SOLUTION RESPIRATORY (INHALATION) at 11:24

## 2021-10-19 RX ADMIN — HUMAN INSULIN 6 UNITS/HR: 100 INJECTION, SOLUTION SUBCUTANEOUS at 19:53

## 2021-10-19 RX ADMIN — HEPARIN SODIUM 5000 UNITS: 10000 INJECTION, SOLUTION INTRAVENOUS; SUBCUTANEOUS at 05:58

## 2021-10-19 RX ADMIN — Medication: at 08:29

## 2021-10-19 RX ADMIN — ACETAMINOPHEN 975 MG: 325 TABLET, FILM COATED ORAL at 08:03

## 2021-10-19 RX ADMIN — HEPARIN SODIUM 5000 UNITS: 10000 INJECTION, SOLUTION INTRAVENOUS; SUBCUTANEOUS at 20:02

## 2021-10-19 RX ADMIN — NYSTATIN 1000000 UNITS: 500000 SUSPENSION ORAL at 20:04

## 2021-10-19 RX ADMIN — Medication: at 15:45

## 2021-10-19 RX ADMIN — DEXMEDETOMIDINE 1.2 MCG/KG/HR: 100 INJECTION, SOLUTION, CONCENTRATE INTRAVENOUS at 04:17

## 2021-10-19 RX ADMIN — GABAPENTIN 100 MG: 250 SUSPENSION ORAL at 13:19

## 2021-10-19 RX ADMIN — LEVALBUTEROL HYDROCHLORIDE 1.25 MG: 1.25 SOLUTION RESPIRATORY (INHALATION) at 15:53

## 2021-10-19 RX ADMIN — ACETYLCYSTEINE 2 ML: 200 SOLUTION ORAL; RESPIRATORY (INHALATION) at 15:53

## 2021-10-19 RX ADMIN — MULTIVIT AND MINERALS-FERROUS GLUCONATE 9 MG IRON/15 ML ORAL LIQUID 15 ML: at 08:03

## 2021-10-19 RX ADMIN — Medication 1 PACKET: at 08:28

## 2021-10-19 RX ADMIN — BUMETANIDE 2 MG: 0.25 INJECTION INTRAMUSCULAR; INTRAVENOUS at 16:34

## 2021-10-19 RX ADMIN — NYSTATIN 1000000 UNITS: 500000 SUSPENSION ORAL at 15:45

## 2021-10-19 RX ADMIN — PROPOFOL 75 MCG/KG/MIN: 10 INJECTION, EMULSION INTRAVENOUS at 09:04

## 2021-10-19 RX ADMIN — MYCOPHENOLATE MOFETIL 1500 MG: 200 POWDER, FOR SUSPENSION ORAL at 08:30

## 2021-10-19 RX ADMIN — LEVALBUTEROL HYDROCHLORIDE 1.25 MG: 1.25 SOLUTION RESPIRATORY (INHALATION) at 20:17

## 2021-10-19 RX ADMIN — POLYETHYLENE GLYCOL 3350 17 G: 17 POWDER, FOR SOLUTION ORAL at 08:03

## 2021-10-19 RX ADMIN — DEXTROSE 6.5 MCG/KG/MIN: 50 INJECTION, SOLUTION INTRAVENOUS at 08:38

## 2021-10-19 RX ADMIN — DOCUSATE SODIUM 50 MG AND SENNOSIDES 8.6 MG 1 TABLET: 8.6; 5 TABLET, FILM COATED ORAL at 20:04

## 2021-10-19 RX ADMIN — Medication 1 PACKET: at 20:02

## 2021-10-19 RX ADMIN — Medication 150 MCG/HR: at 16:34

## 2021-10-19 RX ADMIN — POTASSIUM CHLORIDE 20 MEQ: 40 SOLUTION ORAL at 08:33

## 2021-10-19 RX ADMIN — PROPOFOL 50 MCG/KG/MIN: 10 INJECTION, EMULSION INTRAVENOUS at 19:53

## 2021-10-19 RX ADMIN — Medication: at 23:57

## 2021-10-19 RX ADMIN — HUMAN INSULIN 3 UNITS/HR: 100 INJECTION, SOLUTION SUBCUTANEOUS at 02:03

## 2021-10-19 RX ADMIN — PANTOPRAZOLE SODIUM 40 MG: 40 INJECTION, POWDER, FOR SOLUTION INTRAVENOUS at 08:03

## 2021-10-19 RX ADMIN — NYSTATIN 1000000 UNITS: 500000 SUSPENSION ORAL at 11:57

## 2021-10-19 RX ADMIN — ACYCLOVIR 400 MG: 200 SUSPENSION ORAL at 20:02

## 2021-10-19 RX ADMIN — HUMAN INSULIN 4 UNITS/HR: 100 INJECTION, SOLUTION SUBCUTANEOUS at 12:09

## 2021-10-19 RX ADMIN — ESCITALOPRAM 5 MG: 5 TABLET, FILM COATED ORAL at 08:29

## 2021-10-19 RX ADMIN — LEVOTHYROXINE SODIUM 25 MCG: 0.03 TABLET ORAL at 08:03

## 2021-10-19 RX ADMIN — CEFTAZIDIME 2 G: 2 INJECTION, POWDER, FOR SOLUTION INTRAVENOUS at 23:55

## 2021-10-19 RX ADMIN — MYCOPHENOLATE MOFETIL 1500 MG: 200 POWDER, FOR SUSPENSION ORAL at 20:02

## 2021-10-19 RX ADMIN — LEVALBUTEROL HYDROCHLORIDE 1.25 MG: 1.25 SOLUTION RESPIRATORY (INHALATION) at 07:57

## 2021-10-19 RX ADMIN — GABAPENTIN 100 MG: 250 SUSPENSION ORAL at 08:29

## 2021-10-19 RX ADMIN — PROPOFOL 75 MCG/KG/MIN: 10 INJECTION, EMULSION INTRAVENOUS at 03:33

## 2021-10-19 RX ADMIN — EPINEPHRINE 0.01 MCG/KG/MIN: 1 INJECTION PARENTERAL at 03:20

## 2021-10-19 RX ADMIN — PROPOFOL 75 MCG/KG/MIN: 10 INJECTION, EMULSION INTRAVENOUS at 12:09

## 2021-10-19 RX ADMIN — DEXTROSE 6.5 MCG/KG/MIN: 50 INJECTION, SOLUTION INTRAVENOUS at 01:29

## 2021-10-19 RX ADMIN — ACETYLCYSTEINE 2 ML: 200 SOLUTION ORAL; RESPIRATORY (INHALATION) at 07:57

## 2021-10-19 RX ADMIN — ACYCLOVIR 400 MG: 200 SUSPENSION ORAL at 08:28

## 2021-10-19 RX ADMIN — PROPOFOL 60 MCG/KG/MIN: 10 INJECTION, EMULSION INTRAVENOUS at 15:50

## 2021-10-19 RX ADMIN — PREDNISOLONE 17.5 MG: 15 SOLUTION ORAL at 08:28

## 2021-10-19 RX ADMIN — DOCUSATE SODIUM 50 MG AND SENNOSIDES 8.6 MG 1 TABLET: 8.6; 5 TABLET, FILM COATED ORAL at 08:03

## 2021-10-19 RX ADMIN — TACROLIMUS 2 MG: 0.5 CAPSULE ORAL at 08:31

## 2021-10-19 RX ADMIN — DEXMEDETOMIDINE 1.2 MCG/KG/HR: 100 INJECTION, SOLUTION, CONCENTRATE INTRAVENOUS at 14:38

## 2021-10-19 RX ADMIN — ACETYLCYSTEINE 2 ML: 200 SOLUTION ORAL; RESPIRATORY (INHALATION) at 20:17

## 2021-10-19 RX ADMIN — BUMETANIDE 1 MG/HR: 0.25 INJECTION INTRAMUSCULAR; INTRAVENOUS at 09:55

## 2021-10-19 RX ADMIN — HEPARIN SODIUM 5000 UNITS: 10000 INJECTION, SOLUTION INTRAVENOUS; SUBCUTANEOUS at 11:57

## 2021-10-19 RX ADMIN — Medication 5 MG: at 20:04

## 2021-10-19 RX ADMIN — BUMETANIDE 1 MG: 0.25 INJECTION INTRAMUSCULAR; INTRAVENOUS at 02:18

## 2021-10-19 RX ADMIN — GABAPENTIN 100 MG: 250 SUSPENSION ORAL at 20:02

## 2021-10-19 RX ADMIN — PROPOFOL 75 MCG/KG/MIN: 10 INJECTION, EMULSION INTRAVENOUS at 00:28

## 2021-10-19 RX ADMIN — PANTOPRAZOLE SODIUM 40 MG: 40 INJECTION, POWDER, FOR SOLUTION INTRAVENOUS at 20:05

## 2021-10-19 RX ADMIN — TACROLIMUS 4 MG: 0.5 CAPSULE ORAL at 18:04

## 2021-10-19 RX ADMIN — DEXMEDETOMIDINE 1.2 MCG/KG/HR: 100 INJECTION, SOLUTION, CONCENTRATE INTRAVENOUS at 20:06

## 2021-10-19 RX ADMIN — Medication 100 MCG/HR: at 00:12

## 2021-10-19 RX ADMIN — DEXMEDETOMIDINE 1.2 MCG/KG/HR: 100 INJECTION, SOLUTION, CONCENTRATE INTRAVENOUS at 09:25

## 2021-10-19 RX ADMIN — BUMETANIDE 2 MG: 0.25 INJECTION INTRAMUSCULAR; INTRAVENOUS at 08:48

## 2021-10-19 RX ADMIN — ACETYLCYSTEINE 2 ML: 200 SOLUTION ORAL; RESPIRATORY (INHALATION) at 11:25

## 2021-10-19 RX ADMIN — NYSTATIN 1000000 UNITS: 500000 SUSPENSION ORAL at 08:03

## 2021-10-19 RX ADMIN — PREDNISOLONE 17.5 MG: 15 SOLUTION ORAL at 20:02

## 2021-10-19 RX ADMIN — POLYETHYLENE GLYCOL 3350 17 G: 17 POWDER, FOR SOLUTION ORAL at 20:02

## 2021-10-19 ASSESSMENT — ACTIVITIES OF DAILY LIVING (ADL)
ADLS_ACUITY_SCORE: 12
ADLS_ACUITY_SCORE: 16
ADLS_ACUITY_SCORE: 12
ADLS_ACUITY_SCORE: 16
ADLS_ACUITY_SCORE: 12
ADLS_ACUITY_SCORE: 16
ADLS_ACUITY_SCORE: 16
ADLS_ACUITY_SCORE: 12
ADLS_ACUITY_SCORE: 16
ADLS_ACUITY_SCORE: 12
ADLS_ACUITY_SCORE: 12
ADLS_ACUITY_SCORE: 16
ADLS_ACUITY_SCORE: 12
ADLS_ACUITY_SCORE: 16
ADLS_ACUITY_SCORE: 12

## 2021-10-19 ASSESSMENT — MIFFLIN-ST. JEOR: SCORE: 1495

## 2021-10-19 NOTE — PROGRESS NOTES
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    OVERNIGHT EVENTS:  Needed 1 units PRBCs      TODAY'S PROGRESS:   - Weaning paralysis(paralyzed as the patient gets agitated and hypoxic, with arrhthmias)  - Bumex 1 mg followed by 1 mg/hr  - Goal CI>2 (Epi over Norepi)  - Urine output improved after bumex infusion- will watch for I/O    PLAN:  Neuro/ pain/ sedation:  Acute Postoperative pain  Anxiety  Depression  - Monitor neurological status. Notify the MD for any acute changes in exam.  - Pain: Fentanyl gtt. Tylenol, Robaxin, Gabapentin ramses. PRN oxycodone, IV dilaudid,  - Sedation: Propofol, Dexmedetomdine gtt  - Paralysis: Cisatracurium gtt. Wean paralysis.  - BIS monitoring- goals 40-60  - Continue PTA Escitalopram, Melatonin     Pulmonary care:   Acute Hypoxic Respiratory Failure  S/p B/l lung transplant  SALTY on home CPAP  - Ventilator Settings:CMV- 18/400/10/50  - Caroline- 20, weaned to 10  - Bronchoscopy(10/17) did not show much secretions, edema +  - Levo-albuterol nebs and Mucomyst  - Chest Physiotherapy  - Daily CXR  - Titrate supplemental oxygen to maintain saturation above 92%.  - Pulmonary hygiene: Incentive spirometer every 15- 30 minutes when awake, flutter valve, C&DB  - Diuresis: Bumex 1 mg x 1 followed by infusion 1 mg/hr        Cardiovascular:    S/p b/l lung transplant on 10/16/21 by Dr. Corral  HTN  A.fib- resolved  Cardiogenic Shock  Recent echo on 09/07/21 with LVEF of 60-65%. Pulmonary HTN.     - Cardiac Cath(9/8/21)-     Right sided filling pressures are mildly elevated.    Moderately elevated pulmonary artery hypertension.    Left sided filling pressures are  normal.    Normal cardiac output level.    Normal coronary arteries with mild tortuosity     - Monitor hemodynamic status.   - Lactic Acid: 2.9  - Goal MAP >65  - Pressors: Epi gtt. Goal CI>2  - Statin: none  - ASA: one  - PTA meds: Amlodipine withheld  - Amiodarone infusion- 0.5 mg/min             GI care/ Nutrition:   Elevated LFTs- resolving  - Diet: [place] feeds. Advance feeds  - PPI  - Continue bowel regimen: miralax, senna  - Trend LFTs    Renal/ Fluid Balance/ Electrolytes:   BL creat appears to be ~ 0.7   - Diuresis: Bumex infusion 1 mg/hr  - Strict I/O, daily weights  - Avoid/limit nephrotoxins as able  - Replete lytes PRN per protocol  -   Endocrine:    Stress induced hyperglycemia  Hypothyroidism  DM  RA  Preop A1c -6.6  - Insulin gtt  - Goal BG <180 for optimal healing  - PTA meds: Solumedrol 125 mg q6, Synthroid 25 mcg  - Received 2 doses of Rituximab in 6/21  - Rheumatology consult, will reach out to them on Monday     ID/ Antibiotics:  Immunosuppression  Stress induced leukocytosis  - pre-op WBC -22.9  - Completing today perioperative regimen- Ceftazidime, Vancomycin  - Nystatin, Acyclovir  - Tacrolimus  - Basiliximab on POD#4(10/20/21)  - Methylpred followed by oral prednisone  - Continue to monitor fever curve, WBC and inflammatory markers as appropriate  - Pan cultures sent today- follow up  - (PTA Bactrim, Cefepime, Doxycycline)  -Azathioprine to be avoided after transplant given low TPMT based on recommendations of transplant pulmonology, however if used, then azathioprine to be used in a low-dose     Heme:     Acute blood loss anemia  Hypogammaglobulinemia  Pre-op Hb- 11.1, Platelet- 224  Consider IVIG tomorrow  No s/sx active bleeding  - CBC in AM  - ID as above     MSK/ Skin:  Sternotomy  Surgical Incision  - Sternal precautions  - Postoperative incision management per protocol  - PT/OT/CR     Prophylaxis:    - Mechanical prophylaxis for DVT: SCDs  - Chemical DVT prophylaxis: Heparin subcutaneous    - PPI for 30 days postoperatively     Lines/ tubes/ drains:  - Right PICC(pre-op)  - Right radial A line(10/16)  - Right CVC(10/16)  - Right [place](10/16)  - ETT(10/16)  - [place](10/16)  - Chest tubes- 4 pleural, 1 mediastinal(10/16)  - OG(10/16)   - NJ(10/18)     Disposition:  - [facility] ICU.      Patient seen, findings and plan discussed with [facility] ICU staff.     Eunice Gutiérrez MD  PGY-3  Anesthesia    ====================================    SUBJECTIVE:   Patient is sedated and intubated. Weaning paralysis and Caroline today. Continues to be dependent on ventilator.    OBJECTIVE:   1. VITAL SIGNS:   Temp:  [97.3  F (36.3  C)-99.7  F (37.6  C)] 97.6  F (36.4  C)  Pulse:  [] 58  Resp:  [18-24] 18  MAP:  [62 mmHg-102 mmHg] 65 mmHg  Arterial Line BP: ()/(40-66) 100/54  FiO2 (%):  [50 %-60 %] 50 %  SpO2:  [92 %-100 %] 100 %  Ventilation Mode: CMV/AC  (Continuous Mandatory Ventilation/ Assist Control)  FiO2 (%): 50 %  Rate Set (breaths/minute): 20 breaths/min  Tidal Volume Set (mL): 400 mL  PEEP (cm H2O): (S) 10 cmH2O  Oxygen Concentration (%): 50 %  Resp: 18      2. INTAKE/ OUTPUT:   I/O last 3 completed shifts:  In: 2541.56 [I.V.:1716.56; NG/GT:390]  Out: 1769 [Urine:579; Emesis/NG output:550; Chest Tube:640]    3. PHYSICAL EXAMINATION:   General: sedated and intubated  Neuro: can't be assessed  Resp: ventilator dependent  CV: RRR  Abdomen: Soft, Non-distended, Non-tender  Incisions: c/d/i  Extremities: warm and well perfused    4. INVESTIGATIONS:   Arterial Blood Gases   Recent Labs   Lab 10/19/21  0342 10/18/21  2352 10/18/21  1953 10/18/21  1629   PH 7.44 7.44 7.43 7.45   PCO2 43 42 44 43   PO2 130* 112* 97 116*   HCO3 29* 28 29* 29*     Complete Blood Count   Recent Labs   Lab 10/19/21  0338 10/19/21  0154 10/18/21  1718 10/18/21  0403 10/17/21  1212 10/17/21  0346 10/16/21  2032 10/16/21  2032   WBC 32.4*  --   --  44.5*  --  39.9*  --  69.5*   HGB 7.6* 7.6* 8.2* 8.7*   < > 11.2*   < > 12.2*   *  --   --  211  --   186  --  250    < > = values in this interval not displayed.     Basic Metabolic Panel  Recent Labs   Lab 10/19/21  0531 10/19/21  0341 10/19/21  0338 10/19/21  0258 10/18/21  2208 10/18/21  2202 10/18/21  1621 10/18/21  1614 10/18/21  1040 10/18/21  1036   NA  --   --  142  --   --  143  --  143  --  144   POTASSIUM  --   --  4.2  --   --  4.1  --  4.1  --  3.9   CHLORIDE  --   --  106  --   --  108  --  107  --  109   CO2  --   --  28  --   --  29  --  28  --  29   BUN  --   --  48*  --   --  48*  --  42*  --  33*   CR  --   --  2.05*  --   --  1.86*  --  1.68*  --  1.54*   * 180* 181* 163*   < > 185*   < > 228*   < > 128*    < > = values in this interval not displayed.     Liver Function Tests  Recent Labs   Lab 10/19/21  0338 10/18/21  2202 10/18/21  1614 10/18/21  1036 10/18/21  0403 10/18/21  0403 10/17/21  2207 10/17/21  1535   * 339* 363* 341*   < > 377*   < > 550*   ALT 1,064* 1,146* 1,080* 994*   < > 1,015*   < > 1,185*   ALKPHOS 142 136 123 77   < > 61   < > 60   BILITOTAL 0.5 0.6 0.7 0.7   < > 0.8   < > 1.1   ALBUMIN 1.5* 1.4* 1.5* 1.5*   < > 1.7*   < > 2.0*   INR 1.41*  --  1.49*  --   --  2.08*  --  1.75*    < > = values in this interval not displayed.     Pancreatic Enzymes  No lab results found in last 7 days.  Coagulation Profile  Recent Labs   Lab 10/19/21  0338 10/18/21  1614 10/18/21  0403 10/17/21  1535 10/17/21  0346 10/17/21  0346 10/16/21  2032 10/16/21  2032 10/16/21  1535 10/16/21  1535 10/15/21  0907 10/15/21  0907   INR 1.41* 1.49* 2.08* 1.75*   < > 1.77*   < > 1.54*   < > 1.38*   < > 1.02   PTT  --   --   --   --   --  41*  --  42*  --  51*  --  30    < > = values in this interval not displayed.         5. RADIOLOGY:   Recent Results (from the past 24 hour(s))   XR Feeding Tube Placement    Narrative    Feeding tube placement: 10/18/2021 11:48 AM    Comparison: None    Indication: Postpyloric feeding tube placement    Fluoroscopy time: 2.8 minutes    Technique: After  injection of Xylocaine gel into the left nostril, a  feeding tube was advanced under fluoroscopic guidance.    Findings: The feeding tube was advanced with the tip in the third  portion of the duodenum. A small amount of barium was injected to  demonstrate placement within the small bowel. The feeding tube was  then flushed with normal saline. The feeding tube was secured using a  nasal bridle.      Impression    Impression: Uncomplicated feeding tube placement with tip in the third  portion of the duodenum.    I, JOSEE ROMERO MD, attest that I was present for all critical  portions of the procedure and was immediately available to provide  guidance and assistance during the remainder of the procedure.    I have personally reviewed the examination and initial interpretation  and I agree with the findings.    JOSEE ROMERO MD         SYSTEM ID:  [id number]   XR Chest Port 1 View    Narrative    EXAM: XR CHEST PORT 1 VIEW  10/18/2021 3:03 PM     HISTORY:  coarse breathsounds and breathing pattern change       COMPARISON:  Same day chest radiograph at 0045 hours.    FINDINGS:   Portable AP supine view of the chest. The endotracheal tube tip is  approximately 2.8 cm above the shiv. Stable and intact sternotomy  wires, surgical staples and surgical clips. Stable bilateral chest  tubes. Defibrillator pads and electrodes. Right IJ [place] catheter  projects over the main pulmonary artery, unchanged. Partially  visualized enteric tubes courses below the diaphragm. Trachea is  midline. Cardiomediastinal silhouette and pulmonary vasculature are  within normal limits. Increased bilateral mixed interstitial and  airspace opacities. Possible bilateral small pleural effusion. No  appreciable pneumothorax. Unchanged extensive bilateral neck shoulder  and chest wall subcutaneous emphysema.    No acute osseous abnormality. Visualized upper abdomen is  unremarkable.        Impression    IMPRESSION:    1. Increased perihilar and  basal predominant bilateral mixed pulmonary  opacities which may represent worsening pulmonary edema and/or  infection   2. Support devices as described above.  3. Persistent subcutaneous emphysema.    I have personally reviewed the examination and initial interpretation  and I agree with the findings.    SHANDRA CEVALLOS MD         SYSTEM ID:  [id number]       =========================================

## 2021-10-19 NOTE — PLAN OF CARE
Major Shift Events:  Patient's paralytic stopped this afternoon, weaning propofol per MAR as able. Patient's respiratory rate increased to 22 from 18. AMAIRANI's every 4 hours, see flowsheet. Patient had 200mL of serosanguinous fluid out via mediastinal chest tubes in two hours, team notified, chest xray completed. Bumex drip started per MAR, patient having good urine output. Nitric oxide decreased from 20 to 10. Tube feeds increased to 60mL/hr. Patient's tmax 100.4F, team notified.  Plan: Continue to monitor hemodynamic stability, update primary team with any acute changes, and continue with plan of care.  For vital signs and complete assessments, please see documentation flowsheets.     Problem: Adult Inpatient Plan of Care  Goal: Plan of Care Review  Outcome: Improving  Goal: Patient-Specific Goal (Individualized)  Outcome: Improving  Goal: Absence of Hospital-Acquired Illness or Injury  Outcome: Improving  Intervention: Identify and Manage Fall Risk  Recent Flowsheet Documentation  Taken 10/19/2021 0800 by Alessandra Roldan RN  Safety Promotion/Fall Prevention:    activity supervised    clutter free environment maintained    increased rounding and observation    room door open    room near nurse's station    room organization consistent    safety round/check completed    treat reversible contributory factors  Intervention: Prevent Skin Injury  Recent Flowsheet Documentation  Taken 10/19/2021 1600 by Alessandra Roldan RN  Body Position:    left    turned  Taken 10/19/2021 1400 by Alessandra Roldan, TIFFANY  Body Position:    right    turned  Taken 10/19/2021 1200 by Alessandra Roldan, TFIFANY  Body Position:    left    turned  Taken 10/19/2021 1000 by Alessandra Roldan, TIFFANY  Body Position:    right    turned  Taken 10/19/2021 0800 by Alessandra Roldan, RN  Body Position:    left    turned  Intervention: Prevent and Manage VTE (Venous Thromboembolism) Risk  Recent Flowsheet Documentation  Taken 10/19/2021 1600 by Yuli  Alessandra PACHECO RN  VTE Prevention/Management:    pneumatic compression device    bleeding risk assessed    bleeding risk factor(s) identified, physician notified    PROM (passive range of motion) performed  Taken 10/19/2021 1200 by Alessandra Roldan RN  VTE Prevention/Management:    pneumatic compression device    bleeding risk assessed    bleeding risk factor(s) identified, physician notified    PROM (passive range of motion) performed  Taken 10/19/2021 0800 by Alessandra Roldan RN  VTE Prevention/Management:    pneumatic compression device    bleeding risk assessed    bleeding risk factor(s) identified, physician notified    PROM (passive range of motion) performed  Intervention: Prevent Infection  Recent Flowsheet Documentation  Taken 10/19/2021 0800 by Alessandra Roldan, RN  Infection Prevention:    hand hygiene promoted    equipment surfaces disinfected    personal protective equipment utilized    single patient room provided  Goal: Optimal Comfort and Wellbeing  Outcome: Improving  Goal: Readiness for Transition of Care  Outcome: Improving     Problem: ARDS (Acute Respiratory Distress Syndrome)  Goal: Effective Oxygenation  Outcome: Improving     Problem: Hyperglycemia  Goal: Blood Glucose Level Within Targeted Range  Outcome: Improving     Problem: Hypertension Comorbidity  Goal: Blood Pressure in Desired Range  Outcome: Improving     Problem: Risk for Delirium  Goal: Optimal Coping  Outcome: Improving  Goal: Improved Behavioral Control  Outcome: Improving  Goal: Improved Attention and Thought Clarity  Outcome: Improving  Goal: Improved Sleep  Outcome: Improving

## 2021-10-19 NOTE — PLAN OF CARE
Major Shift Events: Afebrile. Sedated and paralyzed. TOF at goal 1/4. Pt overbreathing vent, Fentanyl increased to 150. SB/SR, 50s-70s. Piyush done q4h, SVO2 decreased to 41, CVP 16, refer to flowsheet for remaining numbers. PEEP decreased to 10 per Dr. Green. TF increased to 50ml/hr, next increase at noon to goal of 60ml/hr. 1mg IV bumex given, with increase in urine output. HGB 7.6, 1 unit of blood given.     Plan: Continue to trend SVO2. Increase TF to 60ml/hr at noon.     For vital signs and complete assessments, please see documentation flowsheets.

## 2021-10-19 NOTE — PROGRESS NOTES
Pulmonary Medicine  Cystic Fibrosis - Lung Transplant Team  Progress Note  10/19/2021       Patient: Edson Thornton  MRN: [id number]  : 1965 (age 56 year old)  Transplant: 10/16/2021 (Lung), POD#3  Admission date: 2021    Assessment & Plan:     Edson Thornton is a 56 year old male with a PMH significant for NSIP/ILD, bronchiectasis, moderate PH, RA, SALTY, chronic HSV infection, hypogammaglobulinemia, steroid-induced diabetes, hypothyroidism, HTN, HLD, duodenal anomaly, anxiety, and depression.  Admitted on 21 from OSH for acute on chronic respiratory failure 2/2 ILD exacerbation.  Pt. is now s/p BSLT on 10/16/21.  Surgery relatively uncomplicated but pt. with low cardiac index and PGD immediately post-op.  The patient is currently POD #3 and remains intubated in the CVICU.  Afib with RVR noted 10/18 and also with BRENNAN.     Today's recommendations:   - Tentative plan for bronchoscopy tomorrow   - Bumex drip per ICU team, low threshold for nephrology consult  - CXR daily as below   - Defer chest CT non-contrast today, revisit daily  - Bowel regimen per ICU team, consider po Narcan   - Await explant pathology (should include mediastinal LN)  - Repeat basiliximab tomorrow (ordered)  - Tacrolimus level to trend remains subtherapeutic, dose increased, continue daily levels (ordered)  - Prednisone taper 10/24 (not yet ordered)  - Continue to hold Bactrim  - EBV  (not yet ordered)  - Acyclovir through POD #30  - Defer treatment of Staph epidermidis and Candida on bronch 10/17 at this time  - Coccidioides Ag (10/17) pending  - IVIG indicated but defer today   - Repeat DSA 10/24 (ordered)  - Donor risk labs ordered 11/15     S/p BSLT for ILD:  Acute hypoxic/hypercapneic respiratory failure: Patient is currently on epinephrine for low CI.  Remains intubated on CMV 18//10/40, continues on Caroline at 10.  Unfortunately had not received vaccination for flu, PNA, or COVID-19 PTA (not available after  admission d/t immunosuppression state).  POD #1 CXR with new RLL focal opacity and marked increase in diffuse interstitial opacities concerning for pulmonary edema.  - Nebs: levalbuterol and Mucomyst QID  - Aggressive pulmonary toilet with chest physiotherapy QID (addition of Aerobika and incentive spirometry once extubated)  - Ventilator management per ICU team  - Bronchoscopy deferred today, tentative plan for tomorrow  - Anesthesia to place erector spinae catheters prior to anticipated extubation per our routine, deferred today  - Chest tubes managed by surgical team  - Diuresis per ICU team, plan for Bumex drip today  - CXR today with slightly decreased basilar predominant interstitial and airspace opacities, small bilateral pleural effusions, and stable subcutaneous emphysema (personally reviewed with Dr. Mckeon)  - Defer chest CT non-contrast today, revisit daily  - S/p post-pyloric nasal FT placement by radiology 10/18, TF management per RD and ICU team, bowel regimen per ICU team (consider po Narcan)  - Recommend SLP consult as pt. nears extubation for swallowing evaluation  - Await explant pathology (should include mediastinal LN)     Immunosuppression:  - Induction therapy with basiliximab (and high dose IV steroid) given intraoperatively, repeating basiliximab dose on POD#4 (ordered)  - Tacrolimus SL BID.  Goal tacrolimus level 8-12.  Continuing SL dosing until tacrolimus goal is achieved (target: within 7 days post-op) and return of bowel function post-op.  Daily tacrolimus levels for the first 1-2 weeks with dose adjustments prn based on levels and changes in medications/patient condition.  See below for initial levels and dosing trends:  Date Tacro Level Intervention   10/18 <3 Dose increased from 1 mg to 2 mg BID   10/19 <3  Dose increased to 4 mg BID           - MMF 1500 mg BID (on PTA, AZA to be avoided given TPMT)  - Prednisolone 17.5 mg BID with next taper on 10/24 (not yet ordered) per lung  transplant protocol:  Date AM dose (mg) PM dose (mg)   10/17/21 17.5 17.5   10/24/21 15 15   10/31/21 15 12.5   11/7/21 12.5 12.5   11/21/21 12.5 10   12/5/21 10 10   1/2/22 10 7.5   1/30/22 7.5 7.5   2/27/22 7.5 5   3/27/22 5 5   4/24/22 5 2.5      Prophylaxis:   - Bactrim for PJP ppx deferred post-op pending assessment of renal function  - Nystatin for oral candidiasis ppx, 6 month course  - See below for serologies and viral ppx:    Donor Recipient Intervention   CMV status Negative Negative None   EBV status Positive Positive N/A, EBV monthly (11/16, not yet ordered)   HSV status N/A Positive Acyclovir POD #1-30   (recent infection history)      Primary graft dysfunction (per ISHLT guidelines):  See chart below:    POD #0  (~0 hours) POD #1  (~24 hours) POD #2   (~48 hours) POD#3   (~72 hours)   Date 10/16 10/17 10/18 10/19   Time 1536 1537  1629     Intubated Y Y Y Y/N   PaO2 227 108  116     FiO2 100 100  60     P/F Ratio 227 108  193     PGD Grade   (0=mild, 3=severe) 2 3  3     ECMO N N N Y/N   Inhaled NO/Flolan Y Y Y Y/N   ISHLT PGD Scoring  Grade 0 - PaO2/FiO2 >300 and normal chest radiograph (absence of diffuse allograft infiltrates)  Grade 1 - PaO2/FiO2 >300 and diffuse allograft infiltrates on chest radiograph  Grade 2 - PaO2/FiO2 between 200 and 300  Grade 3 - PaO2/FiO2 <200 and/or on ECMO     ID: Concern for possible Strongyloides exposure pre-transplant s/p ivermectin x1 dose (9/17).  Fevers post-op, Tmax 101.7 POD #1.  S/p IV ceftaz/vancomycin for 48h per protocol.  - Donor cultures NGTD (UNOS data personally reviewed this morning)  - Recipient cultures NGTD (including fungal and AFB)   - Blood cultures (10/17) NGTD  - Bronch cultures (10/17) with Staph epidermidis and Candida albicans, defer treatment at this time, sputum culture (10/17) with normal jean marie  - Monthly Coccidioides Ag x12 months post-transplant per ID (urine and serum pending 10/17)  - Low threshold for transplant ID consult given  pre-transplant ID history     HSV: Chronic intermittent active infection pre-transplant with recent HSV infection: crusted lesions throughout left side of jaw, s/p 10 day treatment course of ACV through 10/9.  HSV PCR blood negative 10/17.  - ACV ppx as above (starting on POD #1 instead of POD #8 given HSV history and location)     Hypogammaglobulinemia: IgG previously low at 364 (9/7).  Noted at 265 at time of transplant (10/15).  - Plan to give IVIG with premedication when appropriate given fluid status, defer today     Positive cross match: Note that he received two doses of rituximab in June, which is likely contributing to cross match result.  DSA negative 10/17.  - Repeat DSA weekly x2 then q2 weeks (due 10/24, ordered)     PHS risk criteria donor:  Additional labs required post-transplant (between 4-8 weeks post-op): Hepatitis B, Hepatitis C, and HIV by VISHAL ([id number], ordered POD #30).     SALTY: Noted pre-transplant. Home CPAP 6-12 cmH2)  - Resume BiPAP at night post-extubation     Other relevant problems being managed by primary team:    Afib with RVR: Noted 10/18, started on amiodarone drip.  Currently in sinus rhythm.    - Management per ICU team     Elevated LFTs: Possible shock liver post-op.  Initial ALT//230 evening of surgery, then with marked increase to 1550/1246 POD #1.  - Daily LFTs     BRENNAN: Baseline creatinine 0.7, increased to 1.2 POD #1 and further increased to 2.05 POD #2.  S/p 3.5 L volume resuscitation the first 12 hours post-op for rising lactic acid (peak 9).  UO also downtrending.   - Bactrim held as above  - Management per ICU team, low threshold for nephrology consult    We appreciate the excellent care provided by the CVICU and CVTS teams.  Recommendations communicated via in person rounding and this note.  Will continue to follow along closely, please do not hesitate to call with any questions or concerns.    Patient seen and discussed with Dr. Mckeon.    Keyla Rice,  "[facility]  Inpatient PRINCESS  Pulmonary CF/Transplant     Subjective & Interval History:     Remains intubated, PEEP decreased from 12 to 10 overnight, PIP high 20s/low 30s.  Caroline weaned to 10 from 20 this morning.  Remains sedated and on paralytic.  Currently back on epinephrine.  Afib with RVR yesterday, started on amiodarone drip, currently in sinus.  Received chest physiotherapy QID yesterday.  Decreasing urine output despite receiving intermittent diuresis yesterday.  TF at 50 ml/hr via post-pyloric nasal FT.  No post-op BM yet.    Review of Systems:     ROS as above otherwise significantly limited due to intubation and sedation.    Physical Exam:     Vital signs:  Temp: 98.6  F (37  C) Temp src: Axillary   Pulse: 66   Resp: 18 SpO2: 93 % O2 Device: Mechanical Ventilator Oxygen Delivery: 15 LPM Height: 162.6 cm (5' 4\") Weight: 75.4 kg (166 lb 3.6 oz)  I/O:     Intake/Output Summary (Last 24 hours) at 10/19/2021 1011  Last data filed at 10/19/2021 1000  Gross per 24 hour   Intake 3304.53 ml   Output 1795 ml   Net 1509.53 ml     Constitutional: Lying in bed, in no apparent distress.   HEENT: Orally intubated.  PULM: Mildly diminished air flow to bases bilaterally.  Coarse crackles t/o.  No rhonchi, no wheezes.  Non-labored breathing on full vent support.  CV: Normal S1 and S2.  RRR.  No murmur, gallop, or rub.  No peripheral edema.   ABD: BS hypoactive, soft, nondistended.    MSK: Moves all extremities.  NEURO: Sedated, on paralytic.  SKIN: Warm, dry, pale.  No rash on limited exam.   PSYCH: Calm.     Lines, Drains, and Devices:  PICC Triple Lumen Right  (Active)   Site Assessment WDL 10/19/21 0400   External Cath Length (cm) 0 cm 10/11/21 1140   Dressing Intervention Chlorhexidine patch;Transparent 10/19/21 0400   Dressing Change Due 10/24/21 10/19/21 0000   PICC Comment CDI 10/19/21 0400   Gray - Status infusing;cap changed 10/19/21 0400   Mosley - Cap Change Due 10/22/21 10/19/21 0400   Red - Status infusing;cap changed " 10/19/21 0400   Red - Cap Change Due 10/22/21 10/19/21 0400   White - Status infusing;cap changed 10/19/21 0400   White - Cap Change Due 10/22/21 10/19/21 0400   Extravasation? No 10/19/21 0400   Line Necessity Yes, meets criteria 10/19/21 0400   Number of days:        Arterial Line 10/16/21 (Active)   Site Assessment WDL 10/19/21 0400   Line Status Pulsatile blood flow;Cap changed 10/19/21 0400   Arterine Line Cap Change Due 10/22/21 10/19/21 0400   Art Line Waveform Appropriate 10/19/21 0400   Art Line Interventions Leveled;Flushed per protocol 10/19/21 0400   Color/Movement/Sensation Capillary refill less than 3 sec 10/19/21 0400   Line Necessity Yes, meets criteria 10/19/21 0400   Dressing Type Transparent 10/19/21 0400   Dressing Status Clean, dry, intact 10/19/21 0400   Dressing Intervention Dressing changed/new dressing 10/17/21 0400   Dressing Change Due 10/24/21 10/19/21 0400   Number of days: 3       CVC Double Lumen Right Internal jugular (Active)   Site Assessment WDL 10/19/21 0400   Dressing Type Chlorhexidine sponge;Transparent 10/19/21 0400   Dressing Status clean;dry;intact 10/18/21 2000   Dressing Intervention new dressing;dressing changed 10/17/21 0000   Dressing Change Due 10/24/21 10/19/21 0000   Line Necessity yes, meets criteria 10/19/21 0000   Brown - Status saline locked;cap changed 10/19/21 0400   Brown - Cap Change Due 10/22/21 10/19/21 0400   White - Status infusing;cap changed 10/19/21 0400   White - Cap Change Due 10/22/21 10/19/21 0400   Phlebitis Scale 0-->no symptoms 10/19/21 0400   Infiltration? no 10/19/21 0400   CVC Comment MAC 10/19/21 0400   Number of days: 3       Pulmonary Artery Catheter - Triple Lumen 10/16/21 1500 Right internal jugular vein (Active)   Dressing dressing dry and intact 10/19/21 0400   Securement secured with sutures 10/19/21 0400   PA Catheter Markings (cm) 47 10/19/21 0400   Phlebitis 0-->no symptoms 10/19/21 0400   Infiltration 0-->no symptoms 10/19/21 0400    Waveform normal 10/19/21 0400   Lumen A - Color WHITE 10/19/21 0400   Lumen A - Status cap changed;transduced 10/19/21 0400   Lumen A - Cap Change Due 10/22/21 10/19/21 0400   Lumen B - Color YELLOW 10/19/21 0400   Lumen B - Status transduced;cap changed 10/19/21 0400   Lumen C - Color BLUE 10/19/21 0400   Lumen C - Status infusing;cap changed 10/19/21 0400   Lumen C - Cap Change Due 10/22/21 10/19/21 0400   Number of days: 3     Data:     LABS    CMP:   Recent Labs   Lab 10/19/21  0854 10/19/21  0747 10/19/21  0647 10/19/21  0622 10/19/21  0341 10/19/21  0338 10/18/21  2208 10/18/21  2202 10/18/21  1621 10/18/21  1614 10/18/21  1040 10/18/21  1036 10/18/21  0407 10/18/21  0403 10/17/21  2358 10/17/21  2207   NA  --   --   --   --   --  142  --  143  --  143  --  144   < > 145*   < > 146*   POTASSIUM  --   --   --   --   --  4.2  --  4.1  --  4.1  --  3.9   < > 3.7   < > 3.7   CHLORIDE  --   --   --   --   --  106  --  108  --  107  --  109   < > 108   < > 111*   CO2  --   --   --   --   --  28  --  29  --  28  --  29   < > 30   < > 29   ANIONGAP  --   --   --   --   --  8  --  6  --  8  --  6   < > 7   < > 6   * 146* 148* 155*   < > 181*   < > 185*   < > 228*   < > 128*   < > 147*   < > 144*   BUN  --   --   --   --   --  48*  --  48*  --  42*  --  33*   < > 30   < > 27   CR  --   --   --   --   --  2.05*  --  1.86*  --  1.68*  --  1.54*   < > 1.56*   < > 1.45*   GFRESTIMATED  --   --   --   --   --  35*  --  40*  --  45*  --  50*   < > 49*   < > 53*   ERIK  --   --   --   --   --  7.9*  --  7.7*  --  7.8*  --  8.2*   < > 8.2*   < > 7.7*   MAG  --   --   --   --   --  2.7*  --   --   --  2.4*  --   --   --  2.5*  --  2.2   PHOS  --   --   --   --   --  5.8*  --   --   --  4.3  --  3.6  --  4.7*   < > 5.2*   PROTTOTAL  --   --   --   --   --  4.2*  --  4.1*  --  4.2*  --  4.0*   < > 3.7*   < > 4.1*   ALBUMIN  --   --   --   --   --  1.5*  --  1.4*  --  1.5*  --  1.5*   < > 1.7*   < > 1.6*   BILITOTAL  --    --   --   --   --  0.5  --  0.6  --  0.7  --  0.7   < > 0.8   < > 0.9   ALKPHOS  --   --   --   --   --  142  --  136  --  123  --  77   < > 61   < > 57   AST  --   --   --   --   --  308*  --  339*  --  363*  --  341*   < > 377*   < > 380*   ALT  --   --   --   --   --  1,064*  --  1,146*  --  1,080*  --  994*   < > 1,015*   < > 994*    < > = values in this interval not displayed.     CBC:   Recent Labs   Lab 10/19/21  0338 10/19/21  0154 10/18/21  1718 10/18/21  0403 10/17/21  1212 10/17/21  0346 10/16/21  2032 10/16/21  2032   WBC 32.4*  --   --  44.5*  --  39.9*  --  69.5*   RBC 2.47*  --   --  2.93*  --  3.71*  --  4.06*   HGB 7.6* 7.6* 8.2* 8.7*   < > 11.2*   < > 12.2*   HCT 23.2*  --   --  26.5*  --  32.8*  --  37.8*   MCV 94  --   --  90  --  88  --  93   MCH 30.8  --   --  29.7  --  30.2  --  30.0   MCHC 32.8  --   --  32.8  --  34.1  --  32.3   RDW 16.4*  --   --  17.0*  --  16.6*  --  16.5*   *  --   --  211  --  186  --  250    < > = values in this interval not displayed.       INR:   Recent Labs   Lab 10/19/21  0338 10/18/21  1614 10/18/21  0403 10/17/21  1535   INR 1.41* 1.49* 2.08* 1.75*       Glucose:   Recent Labs   Lab 10/19/21  0854 10/19/21  0747 10/19/21  0647 10/19/21  0622 10/19/21  0531 10/19/21  0341   * 146* 148* 155* 156* 180*       Blood Gas:   Recent Labs   Lab 10/19/21  0910 10/19/21  0747 10/19/21  0617 10/19/21  0342 10/19/21  0326 10/18/21  2353 10/18/21  2353   PHV 7.33  --   --   --  7.38  --  7.39   PCO2V 56*  --   --   --  52*  --  52*   PO2V 35  --   --   --  27  --  27   HCO3V 29*  --   --   --  30*  --  31*   LORI 2.8*  --   --   --  4.6*  --  5.4*   O2PER 40 50 50   < > 50   < > 50    < > = values in this interval not displayed.       Culture Data No results for input(s): CULT in the last 168 hours.    Virology Data: No results found for: INFLUA, FLUAH1, FLUAH3, ZQ2881, IFLUB, RSVA, RSVB, PIV1, PIV2, PIV3, HMPV, HRVS, ADVBE, ADVC    Historical CMV results (last 3  of prior testing):  No results found for: CMVQNT  No results found for: CMVLOG    Urine Studies    Recent Labs   Lab Test 10/17/21  1642 10/17/21  1041   URINEPH 5.0 5.0   NITRITE Negative Negative   LEUKEST Negative Trace*   WBCU 25* 44*       Most Recent [place] Pulmonary Function Testing (FVC/FEV1 only)  No results found for: 20002  No results found for: 20003  No results found for: 20015  No results found for: 20016    IMAGING    Recent Results (from the past 48 hour(s))   XR Chest Port 1 View    Narrative    Exam: XR CHEST PORT 1 VIEW, 10/17/2021 10:40 AM    Indication: Intubated    Comparison: 10/16/2021    Findings:   Endotracheal tube in the distal third of the esophagus approximately 2  cm above the shiv. Enteric tube tip and sidehole projected off the  film but at least to the level of the stomach. [place]-Sintia catheter tip  in the main right pulmonary artery. There are bibasilar chest tubes  and biapical chest tubes in place. Stable mediastinal chest tube Right  arm PICC tip in the low superior vena cava.     Stable cardiomegaly. Pulmonary vasculature obscured by diffuse mixed  opacities throughout both lungs.      Impression    Impression:   1. Stable support devices.  2. Slight increase in the diffuse mixed opacities throughout both  lungs, suggesting edema.    MAURICIO NAVAS MD         SYSTEM ID:  [id number]   XR Chest Port 1 View    Narrative    Exam: XR CHEST PORT 1 VIEW, 10/17/2021 12:33 PM    Indication: s/p lung transplant - increased oxygen requirements    Comparison: 10/17/2021    Findings:   Endotracheal tube in the distal third of the trachea. Enteric tube tip  and sidehole project off the film but at least to the level of the  stomach. [place]-Sintia catheter tip in the Main pulmonary artery. Biapical  chest tubes and bibasilar chest tubes remain in place. Single  mediastinal tube is unchanged.    Heart within normal limits in size. Pulmonary vasculature obscured by  diffuse mixed opacities throughout  both lungs.      Impression    Impression:   1. Stable support devices  2. Diffuse mixed opacities throughout both lungs suggesting edema or  infection are stable.    MAURICIO NAVAS MD         SYSTEM ID:  [id number]   XR Chest Port 1 View    Narrative    EXAMINATION: XR CHEST PORT 1 VIEW, 10/18/2021 12:50 AM    INDICATION: s/p lung transplant    COMPARISON: 10/17/2021    FINDINGS: Single portable 30 degree upright AP radiograph of the  chest. ET tube projects 2.1 cm above the shiv. Stable bilateral  chest tubes. [place]-Sintia catheter with tip projecting over the distal  main pulmonary artery. Right upper extremity PICC line, tip is  obscured by [place]-Sintia catheter. Enteric tube courses below the left  hemidiaphragm, tip is not within view. Intact clam shell sternotomy  wires.    Trachea is midline. Postsurgical changes bilateral lung transplant. No  appreciable pleural effusion. No pneumothorax. Unchanged bilateral  interstitial and airspace opacities.    Similar-appearing subcutaneous emphysema along the left chest wall and  the right base of the neck. No acute osseous abnormality.         Impression    IMPRESSION:  Stable changes of bilateral lung transplant with stable positioning of  support devices. Unchanged bilateral interstitial and airspace  opacities which may represent infectious or inflammatory process with  likely some degree of pulmonary edema.    I have personally reviewed the examination and initial interpretation  and I agree with the findings.    FITO PERALES MD         SYSTEM ID:  [id number]   XR Feeding Tube Placement    Narrative    Feeding tube placement: 10/18/2021 11:48 AM    Comparison: None    Indication: Postpyloric feeding tube placement    Fluoroscopy time: 2.8 minutes    Technique: After injection of Xylocaine gel into the left nostril, a  feeding tube was advanced under fluoroscopic guidance.    Findings: The feeding tube was advanced with the tip in the third  portion of the duodenum. A  small amount of barium was injected to  demonstrate placement within the small bowel. The feeding tube was  then flushed with normal saline. The feeding tube was secured using a  nasal bridle.      Impression    Impression: Uncomplicated feeding tube placement with tip in the third  portion of the duodenum.    I, JOSEE ROMERO MD, attest that I was present for all critical  portions of the procedure and was immediately available to provide  guidance and assistance during the remainder of the procedure.    I have personally reviewed the examination and initial interpretation  and I agree with the findings.    JOSEE ROMERO MD         SYSTEM ID:  [id number]   XR Chest Port 1 View    Narrative    EXAM: XR CHEST PORT 1 VIEW  10/18/2021 3:03 PM     HISTORY:  coarse breathsounds and breathing pattern change       COMPARISON:  Same day chest radiograph at 0045 hours.    FINDINGS:   Portable AP supine view of the chest. The endotracheal tube tip is  approximately 2.8 cm above the shiv. Stable and intact sternotomy  wires, surgical staples and surgical clips. Stable bilateral chest  tubes. Defibrillator pads and electrodes. Right IJ [place]-Sintia catheter  projects over the main pulmonary artery, unchanged. Partially  visualized enteric tubes courses below the diaphragm. Trachea is  midline. Cardiomediastinal silhouette and pulmonary vasculature are  within normal limits. Increased bilateral mixed interstitial and  airspace opacities. Possible bilateral small pleural effusion. No  appreciable pneumothorax. Unchanged extensive bilateral neck shoulder  and chest wall subcutaneous emphysema.    No acute osseous abnormality. Visualized upper abdomen is  unremarkable.        Impression    IMPRESSION:    1. Increased perihilar and basal predominant bilateral mixed pulmonary  opacities which may represent worsening pulmonary edema and/or  infection   2. Support devices as described above.  3. Persistent subcutaneous emphysema.    I  have personally reviewed the examination and initial interpretation  and I agree with the findings.    SHANDRA CEVALLOS MD         SYSTEM ID:  [id number]   XR Chest Port 1 View    Narrative    EXAMINATION: XR CHEST PORT 1 VIEW, 10/19/2021 1:19 AM    INDICATION: s/p lung transplant    COMPARISON: 10/18/2021    FINDINGS: Single portable supine AP radiograph of the chest. ET tube  projects 2.5 cm above the shiv. Right IJ [place]-Sintia catheter projects  over the main pulmonary artery. Intact clam shell sternotomy wires.  Surgical clips over the low thorax. Bibasilar chest tubes. Apically  directed chest tubes bilaterally. Mediastinal drain. Enteric tubes  course below the left hemidiaphragm, tips are not within view.  External pacing pad.    Trachea is midline. The cardiomediastinal silhouette is indistinct.  Bilateral pleural effusions. Slightly decreased basilar predominant  interstitial and airspace opacities. No appreciable pneumothorax.    No acute osseous abnormality. Unchanged subcutaneous emphysema across  the chest wall and right neck.         Impression    IMPRESSION:  1. Stable support devices.  2. Slightly decreased basilar predominant interstitial and airspace  opacities favored to represent pulmonary edema. Small bilateral  pleural effusions.  3. Stable subcutaneous emphysema.    I have personally reviewed the examination and initial interpretation  and I agree with the findings.    ROSS OLMSTEAD MD         SYSTEM ID:  [id number]

## 2021-10-19 NOTE — PROGRESS NOTES
CLINICAL NUTRITION SERVICES - REASSESSMENT NOTE   Nutrition Prescription    Malnutrition Status:    Moderate malnutrition in the context of acute on chronic illness    Recommendations already ordered by Registered Dietitian (RD):  Continue to advance to goal rate TF as ordered:  GOAL: Osmolite 1.5 Conner @ goal of  60ml/hr (1440ml/day) + 1 packet ProSource BID (2 pkts total) will provide: 2240 kcals (35 kca/kg), 112 g PRO (1.8 g/kg), 1097 ml free H20, 293 g CHO, and 0 g fiber daily.     Future/Additional Recommendations:  -Monitor K+ trends. If renal formula needed, rec:  Novasource Renal @ 45 mL/hr (1080 mL) + 1 pkt ProSource TID (3 pkts total) = 2280 kcal (36 kcal/kg), 131 g PRO (2 g/kg), 198 g CHO, and 778 mL water daily.  -Post-transplant diet education as appropriate     EVALUATION OF THE PROGRESS TOWARD GOALS   Diet: NPO     Enteral Access: NDT (placed by Cindi 10/18)    FWF: 30 mL q4h    Nutrition Support: EN  10/17 - 10/18: Osmolite @ 20 mL/hr via OGT (ok per team)     10/18 - Current: Osmolite 1.5 Conner @ goal of  60ml/hr (1440ml/day) + 1 packet ProSource BID (2 pkts total) will provide: 2240 kcals (35 kca/kg), 112 g PRO (1.8 g/kg), 1097 ml free H20, 293 g CHO, and 0 g fiber daily.     Enteral Intake: TF started 10/17. Tolerating TF @ 50 mL/hr currently - goal is 60 mL/hr.      NEW FINDINGS     -Wt trends: Wt uptrended over past week - suspect r/t fluid s/p lung transplant (pt on diuretics, appears edematous on exam). Continue dosing wt of 64 kg (suspect driest wt this admit).   10/19/21 0100 75.4 kg (166 lb 3.6 oz)   10/17/21 0400 73 kg (160 lb 15 oz)   10/16/21 0428 68.9 kg (152 lb)   10/15/21 0000 71.8 kg (158 lb 4.6 oz)   10/14/21 0000 71.4 kg (157 lb 6.5 oz)   10/13/21 0000 71.6 kg (157 lb 13.6 oz)   10/12/21 0000 71.2 kg (156 lb 15.5 oz)   10/11/21 0033 70 kg (154 lb 5.2 oz)   10/10/21 0023 70.3 kg (154 lb 15.7 oz)   10/09/21 0615 70.9 kg (156 lb 4.9 oz)   10/07/21 0623 71.2 kg (156 lb 15.5 oz)   10/06/21  0127 69.1 kg (152 lb 5.4 oz)   10/05/21 0330 69.8 kg (153 lb 14.1 oz)   10/04/21 0300 67.9 kg (149 lb 11.1 oz)   10/03/21 0000 68.4 kg (150 lb 12.7 oz)   10/01/21 0000 67.8 kg (149 lb 7.6 oz)   09/29/21 0437 69.1 kg (152 lb 6.4 oz)   09/28/21 0331 68.9 kg (151 lb 14.4 oz)   09/27/21 0554 68.4 kg (150 lb 12.7 oz)   09/26/21 0359 68.3 kg (150 lb 9.2 oz)   09/25/21 0546 68.1 kg (150 lb 2.1 oz)   09/24/21 0500 68 kg (149 lb 14.6 oz)   09/23/21 0322 67.3 kg (148 lb 5.9 oz)   09/22/21 0000 68.4 kg (150 lb 12.7 oz)   09/21/21 0000 67 kg (147 lb 11.3 oz)   09/20/21 1000 66.9 kg (147 lb 7.8 oz)   09/20/21 0041 65.2 kg (143 lb 11.8 oz)   09/19/21 0000 65.5 kg (144 lb 6.4 oz)   09/17/21 0504 68.3 kg (150 lb 9.2 oz)   09/16/21 0439 68.1 kg (150 lb 2.1 oz)   09/15/21 0650 65 kg (143 lb 4.8 oz)   09/15/21 0639 67.5 kg (148 lb 13 oz)   09/14/21 0554 65 kg (143 lb 4.8 oz)   09/13/21 2116 70.6 kg (155 lb 10.3 oz)   09/13/21 0020 64 kg (141 lb 1.5 oz) -- lowest/driest wt this admit   09/12/21 0500 68.6 kg (151 lb 3.8 oz)   09/11/21 0347 68.5 kg (151 lb 0.2 oz)   09/10/21 0400 68.1 kg (150 lb 1.6 oz)   09/08/21 0626 68.7 kg (151 lb 8 oz)   09/07/21 0400 72.6 kg (160 lb)   09/06/21 0340 72.9 kg (160 lb 11.5 oz)   09/05/21 1752 73.3 kg (161 lb 8 oz) - admit      -Labs: Reviewed, notable for: Na+ 142 (WNL), K+ 4.2 (WNL), BUN 48 (H), Cr 2.05 (H), Mg++ 2.7 (H), Phos 5.8 (H), T bili 0.5 (WNL), Alk phos 142 (WNL), ALT 1064 (H),  (H), Direct bili 0.3 (H)    -GI: Last BM 10/15 per RN flowsheets. On scheduled Miralax and Senokot-S      -Respiratory: s/p bilateral lung transplant on 10/16, intubated.    -Skin: Skin beneath nasal bridle was inspected and bridle clip and string noted to be pressing against nare. Writer adjusted bridle to space farther from nose - landmark essentially unchanged (91-92 cm @ nare). Fits much better, but silicone string still pressing slightly against R columella. If FT is kept midline, this will offload most  "of the remaining pressure.    -Meds & Vitamin/Mineral Supplementation: Reviewed, notable for: Caltrate BID, Lasix, liquid multivitamin/mineral, KCl, mycophenolate, tacrolimus, Bumex, propofol gtt, norepi gtt, epi gtt    MALNUTRITION  % Intake: Decreased intake does not meet criteria  % Weight Loss: None noted  Subcutaneous Fat Loss: None observed  Muscle Loss: Temporal:  Mild, Upper arm (bicep, tricep):  Mild, Lower arm  (forearm):  Mild, Upper leg (quadricep, hamstring):  Mild and Posterior calf:  Mild  Fluid Accumulation/Edema: Mild (BUE appear mildly edematous, BLE are minimally edematous - pt's visitor even commented on his \"puffiness\")  Malnutrition Diagnosis: Moderate malnutrition in the context of acute on chronic illness    Previous Goals   Patient to consume % of nutritionally adequate meal trays TID, or the equivalent with supplements/snacks.  Evaluation: No longer applicable    Previous Nutrition Diagnosis  Predicted inadequate nutrient intake (energy/protein) related to prolonged hospitalization as evidenced by day 37 of hospitalization   Evaluation: No longer applicable, nutrition diagnosis changed below    CURRENT NUTRITION DIAGNOSIS  Inadequate oral intake related to NPO status in setting of intubation as evidenced by need for EN support to meet full nutrition needs at this time.       INTERVENTIONS  Implementation  -Collaboration with other providers: Rounds; Notified bedside RN of reason for bridle adjustment.  -Enteral Nutrition - Continue to advance to goal rate  -Nutrition education (pt's visitor): Reviewed basics of TF and reason for adjustment of bridle.  -Adjusted bridle to offload pressure from nasal columella.    Goals  Total avg nutritional intake to meet a minimum of 30 kcal/kg and 1.3 g PRO/kg daily (per dosing wt 64 kg).    Monitoring/Evaluation  Progress toward goals will be monitored and evaluated per protocol.     Lydia Gallardo RD, LD  Pager: [phone]  "

## 2021-10-20 ENCOUNTER — APPOINTMENT (OUTPATIENT)
Dept: ULTRASOUND IMAGING | Facility: CLINIC | Age: 56
End: 2021-10-20
Attending: STUDENT IN AN ORGANIZED HEALTH CARE EDUCATION/TRAINING PROGRAM
Payer: COMMERCIAL

## 2021-10-20 ENCOUNTER — APPOINTMENT (OUTPATIENT)
Dept: GENERAL RADIOLOGY | Facility: CLINIC | Age: 56
End: 2021-10-20
Attending: STUDENT IN AN ORGANIZED HEALTH CARE EDUCATION/TRAINING PROGRAM
Payer: COMMERCIAL

## 2021-10-20 LAB
ALBUMIN SERPL-MCNC: 1.3 G/DL (ref 3.4–5)
ALBUMIN SERPL-MCNC: 1.3 G/DL (ref 3.4–5)
ALBUMIN SERPL-MCNC: 1.5 G/DL (ref 3.4–5)
ALP SERPL-CCNC: 194 U/L (ref 40–150)
ALP SERPL-CCNC: 196 U/L (ref 40–150)
ALP SERPL-CCNC: 200 U/L (ref 40–150)
ALP SERPL-CCNC: 206 U/L (ref 40–150)
ALP SERPL-CCNC: 214 U/L (ref 40–150)
ALT SERPL W P-5'-P-CCNC: 648 U/L (ref 0–70)
ALT SERPL W P-5'-P-CCNC: 738 U/L (ref 0–70)
ALT SERPL W P-5'-P-CCNC: 851 U/L (ref 0–70)
ALT SERPL W P-5'-P-CCNC: 852 U/L (ref 0–70)
ALT SERPL W P-5'-P-CCNC: 914 U/L (ref 0–70)
ANION GAP SERPL CALCULATED.3IONS-SCNC: 3 MMOL/L (ref 3–14)
ANION GAP SERPL CALCULATED.3IONS-SCNC: 3 MMOL/L (ref 3–14)
ANION GAP SERPL CALCULATED.3IONS-SCNC: 5 MMOL/L (ref 3–14)
ANION GAP SERPL CALCULATED.3IONS-SCNC: 6 MMOL/L (ref 3–14)
ANION GAP SERPL CALCULATED.3IONS-SCNC: 6 MMOL/L (ref 3–14)
AST SERPL W P-5'-P-CCNC: 128 U/L (ref 0–45)
AST SERPL W P-5'-P-CCNC: 146 U/L (ref 0–45)
AST SERPL W P-5'-P-CCNC: 172 U/L (ref 0–45)
AST SERPL W P-5'-P-CCNC: 86 U/L (ref 0–45)
AST SERPL W P-5'-P-CCNC: 99 U/L (ref 0–45)
BASE EXCESS BLDA CALC-SCNC: 10.5 MMOL/L (ref -9–1.8)
BASE EXCESS BLDA CALC-SCNC: 11.5 MMOL/L (ref -9–1.8)
BASE EXCESS BLDA CALC-SCNC: 12.1 MMOL/L (ref -9–1.8)
BASE EXCESS BLDA CALC-SCNC: 12.3 MMOL/L (ref -9–1.8)
BASE EXCESS BLDA CALC-SCNC: 8.5 MMOL/L (ref -9–1.8)
BASE EXCESS BLDA CALC-SCNC: 9.6 MMOL/L (ref -9–1.8)
BASE EXCESS BLDV CALC-SCNC: 10 MMOL/L (ref -7.7–1.9)
BASE EXCESS BLDV CALC-SCNC: 12.2 MMOL/L (ref -7.7–1.9)
BASE EXCESS BLDV CALC-SCNC: 12.6 MMOL/L (ref -7.7–1.9)
BASE EXCESS BLDV CALC-SCNC: 12.9 MMOL/L (ref -7.7–1.9)
BASE EXCESS BLDV CALC-SCNC: 9 MMOL/L (ref -7.7–1.9)
BASE EXCESS BLDV CALC-SCNC: 9.8 MMOL/L (ref -7.7–1.9)
BASOPHILS # BLD AUTO: 0 10E3/UL (ref 0–0.2)
BASOPHILS NFR BLD AUTO: 0 %
BILIRUB DIRECT SERPL-MCNC: 0.2 MG/DL (ref 0–0.2)
BILIRUB SERPL-MCNC: 0.3 MG/DL (ref 0.2–1.3)
BILIRUB SERPL-MCNC: 0.4 MG/DL (ref 0.2–1.3)
BILIRUB SERPL-MCNC: 0.5 MG/DL (ref 0.2–1.3)
BILIRUB SERPL-MCNC: 0.6 MG/DL (ref 0.2–1.3)
BILIRUB SERPL-MCNC: 0.6 MG/DL (ref 0.2–1.3)
BUN SERPL-MCNC: 50 MG/DL (ref 7–30)
BUN SERPL-MCNC: 50 MG/DL (ref 7–30)
BUN SERPL-MCNC: 52 MG/DL (ref 7–30)
BUN SERPL-MCNC: 54 MG/DL (ref 7–30)
BUN SERPL-MCNC: 59 MG/DL (ref 7–30)
CALCIUM SERPL-MCNC: 7.2 MG/DL (ref 8.5–10.1)
CALCIUM SERPL-MCNC: 7.7 MG/DL (ref 8.5–10.1)
CALCIUM SERPL-MCNC: 7.8 MG/DL (ref 8.5–10.1)
CALCIUM SERPL-MCNC: 8 MG/DL (ref 8.5–10.1)
CALCIUM SERPL-MCNC: 8.5 MG/DL (ref 8.5–10.1)
CELL TYPE ALLO: NORMAL
CELL TYPE AUTO: NORMAL
CHANNELSHIFTALLOT1: -87
CHANNELSHIFTAUTOT1: -51
CHLORIDE BLD-SCNC: 104 MMOL/L (ref 94–109)
CHLORIDE BLD-SCNC: 106 MMOL/L (ref 94–109)
CHLORIDE BLD-SCNC: 106 MMOL/L (ref 94–109)
CHLORIDE BLD-SCNC: 107 MMOL/L (ref 94–109)
CHLORIDE BLD-SCNC: 110 MMOL/L (ref 94–109)
CO2 SERPL-SCNC: 29 MMOL/L (ref 20–32)
CO2 SERPL-SCNC: 34 MMOL/L (ref 20–32)
CO2 SERPL-SCNC: 36 MMOL/L (ref 20–32)
CREAT SERPL-MCNC: 1.53 MG/DL (ref 0.66–1.25)
CREAT SERPL-MCNC: 1.6 MG/DL (ref 0.66–1.25)
CREAT SERPL-MCNC: 1.6 MG/DL (ref 0.66–1.25)
CREAT SERPL-MCNC: 1.64 MG/DL (ref 0.66–1.25)
CREAT SERPL-MCNC: 1.69 MG/DL (ref 0.66–1.25)
CROSSMATCHDATEALLO: NORMAL
CROSSMATCHDATEAUTO: NORMAL
DONOR ALLO: NORMAL
DONOR AUTO: NORMAL
DONORCELLDATE ALLO: NORMAL
DONORCELLDATE AUTO: NORMAL
EOSINOPHIL # BLD AUTO: 0 10E3/UL (ref 0–0.7)
EOSINOPHIL NFR BLD AUTO: 0 %
ERYTHROCYTE [DISTWIDTH] IN BLOOD BY AUTOMATED COUNT: 16.2 % (ref 10–15)
GFR SERPL CREATININE-BSD FRML MDRD: 44 ML/MIN/1.73M2
GFR SERPL CREATININE-BSD FRML MDRD: 46 ML/MIN/1.73M2
GFR SERPL CREATININE-BSD FRML MDRD: 47 ML/MIN/1.73M2
GFR SERPL CREATININE-BSD FRML MDRD: 47 ML/MIN/1.73M2
GFR SERPL CREATININE-BSD FRML MDRD: 50 ML/MIN/1.73M2
GLUCOSE BLD-MCNC: 120 MG/DL (ref 70–99)
GLUCOSE BLD-MCNC: 144 MG/DL (ref 70–99)
GLUCOSE BLD-MCNC: 169 MG/DL (ref 70–99)
GLUCOSE BLD-MCNC: 175 MG/DL (ref 70–99)
GLUCOSE BLD-MCNC: 45 MG/DL (ref 70–99)
GLUCOSE BLDC GLUCOMTR-MCNC: 116 MG/DL (ref 70–99)
GLUCOSE BLDC GLUCOMTR-MCNC: 129 MG/DL (ref 70–99)
GLUCOSE BLDC GLUCOMTR-MCNC: 130 MG/DL (ref 70–99)
GLUCOSE BLDC GLUCOMTR-MCNC: 133 MG/DL (ref 70–99)
GLUCOSE BLDC GLUCOMTR-MCNC: 138 MG/DL (ref 70–99)
GLUCOSE BLDC GLUCOMTR-MCNC: 140 MG/DL (ref 70–99)
GLUCOSE BLDC GLUCOMTR-MCNC: 142 MG/DL (ref 70–99)
GLUCOSE BLDC GLUCOMTR-MCNC: 151 MG/DL (ref 70–99)
GLUCOSE BLDC GLUCOMTR-MCNC: 151 MG/DL (ref 70–99)
GLUCOSE BLDC GLUCOMTR-MCNC: 154 MG/DL (ref 70–99)
GLUCOSE BLDC GLUCOMTR-MCNC: 154 MG/DL (ref 70–99)
GLUCOSE BLDC GLUCOMTR-MCNC: 155 MG/DL (ref 70–99)
GLUCOSE BLDC GLUCOMTR-MCNC: 157 MG/DL (ref 70–99)
GLUCOSE BLDC GLUCOMTR-MCNC: 159 MG/DL (ref 70–99)
GLUCOSE BLDC GLUCOMTR-MCNC: 165 MG/DL (ref 70–99)
GLUCOSE BLDC GLUCOMTR-MCNC: 168 MG/DL (ref 70–99)
GLUCOSE BLDC GLUCOMTR-MCNC: 180 MG/DL (ref 70–99)
GLUCOSE BLDC GLUCOMTR-MCNC: 207 MG/DL (ref 70–99)
GLUCOSE BLDC GLUCOMTR-MCNC: 214 MG/DL (ref 70–99)
GLUCOSE BLDC GLUCOMTR-MCNC: 231 MG/DL (ref 70–99)
GLUCOSE BLDC GLUCOMTR-MCNC: 39 MG/DL (ref 70–99)
HCO3 BLD-SCNC: 33 MMOL/L (ref 21–28)
HCO3 BLD-SCNC: 34 MMOL/L (ref 21–28)
HCO3 BLD-SCNC: 35 MMOL/L (ref 21–28)
HCO3 BLD-SCNC: 36 MMOL/L (ref 21–28)
HCO3 BLD-SCNC: 36 MMOL/L (ref 21–28)
HCO3 BLD-SCNC: 37 MMOL/L (ref 21–28)
HCO3 BLDV-SCNC: 35 MMOL/L (ref 21–28)
HCO3 BLDV-SCNC: 36 MMOL/L (ref 21–28)
HCO3 BLDV-SCNC: 36 MMOL/L (ref 21–28)
HCO3 BLDV-SCNC: 37 MMOL/L (ref 21–28)
HCO3 BLDV-SCNC: 38 MMOL/L (ref 21–28)
HCO3 BLDV-SCNC: 38 MMOL/L (ref 21–28)
HCT VFR BLD AUTO: 26 % (ref 40–53)
HGB BLD-MCNC: 8.4 G/DL (ref 13.3–17.7)
IMM GRANULOCYTES # BLD: 1 10E3/UL
IMM GRANULOCYTES NFR BLD: 4 %
INR PPP: 1.25 (ref 0.85–1.15)
INR PPP: 1.25 (ref 0.85–1.15)
LACTATE SERPL-SCNC: 1.2 MMOL/L (ref 0.7–2)
LACTATE SERPL-SCNC: 1.5 MMOL/L (ref 0.7–2)
LACTATE SERPL-SCNC: 1.7 MMOL/L (ref 0.7–2)
LACTATE SERPL-SCNC: 1.8 MMOL/L (ref 0.7–2)
LACTATE SERPL-SCNC: 1.9 MMOL/L (ref 0.7–2)
LYMPHOCYTES # BLD AUTO: 0.7 10E3/UL (ref 0.8–5.3)
LYMPHOCYTES NFR BLD AUTO: 3 %
MAGNESIUM SERPL-MCNC: 2.1 MG/DL (ref 1.6–2.3)
MAGNESIUM SERPL-MCNC: 2.2 MG/DL (ref 1.6–2.3)
MAGNESIUM SERPL-MCNC: 2.2 MG/DL (ref 1.6–2.3)
MCH RBC QN AUTO: 30.3 PG (ref 26.5–33)
MCHC RBC AUTO-ENTMCNC: 32.3 G/DL (ref 31.5–36.5)
MCV RBC AUTO: 94 FL (ref 78–100)
MONOCYTES # BLD AUTO: 0.9 10E3/UL (ref 0–1.3)
MONOCYTES NFR BLD AUTO: 3 %
NEUTROPHILS # BLD AUTO: 24.6 10E3/UL (ref 1.6–8.3)
NEUTROPHILS NFR BLD AUTO: 90 %
NRBC # BLD AUTO: 0.3 10E3/UL
NRBC BLD AUTO-RTO: 1 /100
O2/TOTAL GAS SETTING VFR VENT: 40 %
O2/TOTAL GAS SETTING VFR VENT: 50 %
OXYHGB MFR BLDV: 54 % (ref 70–75)
OXYHGB MFR BLDV: 55 % (ref 70–75)
OXYHGB MFR BLDV: 57 % (ref 70–75)
OXYHGB MFR BLDV: 58 % (ref 70–75)
OXYHGB MFR BLDV: 59 % (ref 70–75)
OXYHGB MFR BLDV: 59 % (ref 70–75)
PCO2 BLD: 41 MM HG (ref 35–45)
PCO2 BLD: 45 MM HG (ref 35–45)
PCO2 BLD: 46 MM HG (ref 35–45)
PCO2 BLD: 47 MM HG (ref 35–45)
PCO2 BLDV: 49 MM HG (ref 40–50)
PCO2 BLDV: 49 MM HG (ref 40–50)
PCO2 BLDV: 53 MM HG (ref 40–50)
PCO2 BLDV: 55 MM HG (ref 40–50)
PCO2 BLDV: 56 MM HG (ref 40–50)
PCO2 BLDV: 58 MM HG (ref 40–50)
PF4 HEPARIN CMPLX AB SER QL: NEGATIVE
PH BLD: 7.47 [PH] (ref 7.35–7.45)
PH BLD: 7.48 [PH] (ref 7.35–7.45)
PH BLD: 7.5 [PH] (ref 7.35–7.45)
PH BLD: 7.51 [PH] (ref 7.35–7.45)
PH BLD: 7.51 [PH] (ref 7.35–7.45)
PH BLD: 7.54 [PH] (ref 7.35–7.45)
PH BLDV: 7.4 [PH] (ref 7.32–7.43)
PH BLDV: 7.42 [PH] (ref 7.32–7.43)
PH BLDV: 7.43 [PH] (ref 7.32–7.43)
PH BLDV: 7.45 [PH] (ref 7.32–7.43)
PH BLDV: 7.48 [PH] (ref 7.32–7.43)
PH BLDV: 7.5 [PH] (ref 7.32–7.43)
PHOSPHATE SERPL-MCNC: 3.1 MG/DL (ref 2.5–4.5)
PHOSPHATE SERPL-MCNC: 3.4 MG/DL (ref 2.5–4.5)
PHOSPHATE SERPL-MCNC: 5.4 MG/DL (ref 2.5–4.5)
PLATELET # BLD AUTO: 100 10E3/UL (ref 150–450)
PO2 BLD: 100 MM HG (ref 80–105)
PO2 BLD: 110 MM HG (ref 80–105)
PO2 BLD: 116 MM HG (ref 80–105)
PO2 BLD: 140 MM HG (ref 80–105)
PO2 BLD: 142 MM HG (ref 80–105)
PO2 BLD: 151 MM HG (ref 80–105)
PO2 BLDV: 31 MM HG (ref 25–47)
PO2 BLDV: 32 MM HG (ref 25–47)
PO2 BLDV: 33 MM HG (ref 25–47)
PO2 BLDV: 33 MM HG (ref 25–47)
POS CUT OFF ALLO B: >93
POS CUT OFF ALLO T: >79
POS CUT OFF AUTO B: >93
POS CUT OFF AUTO T: >79
POTASSIUM BLD-SCNC: 3.5 MMOL/L (ref 3.4–5.3)
POTASSIUM BLD-SCNC: 3.5 MMOL/L (ref 3.4–5.3)
POTASSIUM BLD-SCNC: 4.1 MMOL/L (ref 3.4–5.3)
POTASSIUM BLD-SCNC: 4.2 MMOL/L (ref 3.4–5.3)
PROT SERPL-MCNC: 4.2 G/DL (ref 6.8–8.8)
PROT SERPL-MCNC: 4.4 G/DL (ref 6.8–8.8)
PROT SERPL-MCNC: 4.7 G/DL (ref 6.8–8.8)
PROT SERPL-MCNC: 4.7 G/DL (ref 6.8–8.8)
PROT SERPL-MCNC: 4.9 G/DL (ref 6.8–8.8)
RBC # BLD AUTO: 2.77 10E6/UL (ref 4.4–5.9)
RESULT ALLO B1: NORMAL
RESULT ALLO T1: NORMAL
RESULT AUTO B1: NORMAL
RESULT AUTO T1: NORMAL
SCANNED LAB RESULT: NORMAL
SERUM DATE ALLO B1: NORMAL
SERUM DATE ALLO T1: NORMAL
SERUM DATE AUTO B1: NORMAL
SERUM DATE AUTO T1: NORMAL
SODIUM SERPL-SCNC: 143 MMOL/L (ref 133–144)
SODIUM SERPL-SCNC: 143 MMOL/L (ref 133–144)
SODIUM SERPL-SCNC: 145 MMOL/L (ref 133–144)
SODIUM SERPL-SCNC: 146 MMOL/L (ref 133–144)
SODIUM SERPL-SCNC: 146 MMOL/L (ref 133–144)
TACROLIMUS BLD-MCNC: 8 UG/L (ref 5–15)
TESTMETHODALLO: NORMAL
TESTMETHODAUTO: NORMAL
TME LAST DOSE: NORMAL H
TME LAST DOSE: NORMAL H
TREATMENT ALLO B1: NORMAL
TREATMENT ALLO T1: NORMAL
TREATMENT AUTO B1: NORMAL
TREATMENT AUTO T1: NORMAL
TRIGL SERPL-MCNC: 383 MG/DL
WBC # BLD AUTO: 27.4 10E3/UL (ref 4–11)
ZZZCOMMENT ALLOB1: NORMAL

## 2021-10-20 PROCEDURE — 44500 INTRO GASTROINTESTINAL TUBE: CPT

## 2021-10-20 PROCEDURE — 31624 DX BRONCHOSCOPE/LAVAGE: CPT

## 2021-10-20 PROCEDURE — 250N000012 HC RX MED GY IP 250 OP 636 PS 637: Performed by: PHYSICIAN ASSISTANT

## 2021-10-20 PROCEDURE — 0WCQ8ZZ EXTIRPATION OF MATTER FROM RESPIRATORY TRACT, VIA NATURAL OR ARTIFICIAL OPENING ENDOSCOPIC: ICD-10-PCS | Performed by: INTERNAL MEDICINE

## 2021-10-20 PROCEDURE — 250N000009 HC RX 250: Performed by: STUDENT IN AN ORGANIZED HEALTH CARE EDUCATION/TRAINING PROGRAM

## 2021-10-20 PROCEDURE — 258N000003 HC RX IP 258 OP 636: Performed by: ANESTHESIOLOGY

## 2021-10-20 PROCEDURE — 250N000009 HC RX 250: Performed by: ANESTHESIOLOGY

## 2021-10-20 PROCEDURE — 82805 BLOOD GASES W/O2 SATURATION: CPT | Performed by: SURGERY

## 2021-10-20 PROCEDURE — 250N000013 HC RX MED GY IP 250 OP 250 PS 637: Performed by: STUDENT IN AN ORGANIZED HEALTH CARE EDUCATION/TRAINING PROGRAM

## 2021-10-20 PROCEDURE — 250N000013 HC RX MED GY IP 250 OP 250 PS 637: Performed by: NURSE PRACTITIONER

## 2021-10-20 PROCEDURE — 87106 FUNGI IDENTIFICATION YEAST: CPT | Performed by: INTERNAL MEDICINE

## 2021-10-20 PROCEDURE — 250N000013 HC RX MED GY IP 250 OP 250 PS 637: Performed by: ANESTHESIOLOGY

## 2021-10-20 PROCEDURE — 36415 COLL VENOUS BLD VENIPUNCTURE: CPT | Performed by: SURGERY

## 2021-10-20 PROCEDURE — 250N000011 HC RX IP 250 OP 636: Performed by: ANESTHESIOLOGY

## 2021-10-20 PROCEDURE — 88312 SPECIAL STAINS GROUP 1: CPT | Mod: 26 | Performed by: PATHOLOGY

## 2021-10-20 PROCEDURE — 74340 X-RAY GUIDE FOR GI TUBE: CPT | Mod: 26 | Performed by: RADIOLOGY

## 2021-10-20 PROCEDURE — 87449 NOS EACH ORGANISM AG IA: CPT | Performed by: STUDENT IN AN ORGANIZED HEALTH CARE EDUCATION/TRAINING PROGRAM

## 2021-10-20 PROCEDURE — 93970 EXTREMITY STUDY: CPT | Mod: 26 | Performed by: STUDENT IN AN ORGANIZED HEALTH CARE EDUCATION/TRAINING PROGRAM

## 2021-10-20 PROCEDURE — 99233 SBSQ HOSP IP/OBS HIGH 50: CPT | Mod: 24 | Performed by: INTERNAL MEDICINE

## 2021-10-20 PROCEDURE — 999N000045 HC STATISTIC DAILY SWAN MONITORING

## 2021-10-20 PROCEDURE — 71045 X-RAY EXAM CHEST 1 VIEW: CPT | Mod: 26 | Performed by: RADIOLOGY

## 2021-10-20 PROCEDURE — 94668 MNPJ CHEST WALL SBSQ: CPT

## 2021-10-20 PROCEDURE — 250N000009 HC RX 250: Performed by: THORACIC SURGERY (CARDIOTHORACIC VASCULAR SURGERY)

## 2021-10-20 PROCEDURE — 84075 ASSAY ALKALINE PHOSPHATASE: CPT | Performed by: INTERNAL MEDICINE

## 2021-10-20 PROCEDURE — 99291 CRITICAL CARE FIRST HOUR: CPT | Mod: 24 | Performed by: ANESTHESIOLOGY

## 2021-10-20 PROCEDURE — 250N000012 HC RX MED GY IP 250 OP 636 PS 637: Performed by: NURSE PRACTITIONER

## 2021-10-20 PROCEDURE — 250N000011 HC RX IP 250 OP 636: Performed by: SURGERY

## 2021-10-20 PROCEDURE — C9113 INJ PANTOPRAZOLE SODIUM, VIA: HCPCS | Performed by: NURSE PRACTITIONER

## 2021-10-20 PROCEDURE — 84100 ASSAY OF PHOSPHORUS: CPT | Performed by: STUDENT IN AN ORGANIZED HEALTH CARE EDUCATION/TRAINING PROGRAM

## 2021-10-20 PROCEDURE — 94799 UNLISTED PULMONARY SVC/PX: CPT

## 2021-10-20 PROCEDURE — 84100 ASSAY OF PHOSPHORUS: CPT | Performed by: INTERNAL MEDICINE

## 2021-10-20 PROCEDURE — 258N000003 HC RX IP 258 OP 636: Performed by: STUDENT IN AN ORGANIZED HEALTH CARE EDUCATION/TRAINING PROGRAM

## 2021-10-20 PROCEDURE — 93971 EXTREMITY STUDY: CPT | Mod: LT

## 2021-10-20 PROCEDURE — 87086 URINE CULTURE/COLONY COUNT: CPT | Performed by: SURGERY

## 2021-10-20 PROCEDURE — 94003 VENT MGMT INPAT SUBQ DAY: CPT

## 2021-10-20 PROCEDURE — 84450 TRANSFERASE (AST) (SGOT): CPT | Performed by: INTERNAL MEDICINE

## 2021-10-20 PROCEDURE — 71045 X-RAY EXAM CHEST 1 VIEW: CPT

## 2021-10-20 PROCEDURE — 250N000011 HC RX IP 250 OP 636: Performed by: NURSE PRACTITIONER

## 2021-10-20 PROCEDURE — 84478 ASSAY OF TRIGLYCERIDES: CPT | Performed by: THORACIC SURGERY (CARDIOTHORACIC VASCULAR SURGERY)

## 2021-10-20 PROCEDURE — 80197 ASSAY OF TACROLIMUS: CPT | Performed by: NURSE PRACTITIONER

## 2021-10-20 PROCEDURE — 250N000011 HC RX IP 250 OP 636: Performed by: STUDENT IN AN ORGANIZED HEALTH CARE EDUCATION/TRAINING PROGRAM

## 2021-10-20 PROCEDURE — 83605 ASSAY OF LACTIC ACID: CPT | Performed by: ANESTHESIOLOGY

## 2021-10-20 PROCEDURE — 87102 FUNGUS ISOLATION CULTURE: CPT | Performed by: INTERNAL MEDICINE

## 2021-10-20 PROCEDURE — 200N000002 HC R&B ICU UMMC

## 2021-10-20 PROCEDURE — 250N000013 HC RX MED GY IP 250 OP 250 PS 637: Performed by: THORACIC SURGERY (CARDIOTHORACIC VASCULAR SURGERY)

## 2021-10-20 PROCEDURE — 82248 BILIRUBIN DIRECT: CPT | Performed by: INTERNAL MEDICINE

## 2021-10-20 PROCEDURE — 87070 CULTURE OTHR SPECIMN AEROBIC: CPT | Performed by: INTERNAL MEDICINE

## 2021-10-20 PROCEDURE — 82805 BLOOD GASES W/O2 SATURATION: CPT | Performed by: INTERNAL MEDICINE

## 2021-10-20 PROCEDURE — 83735 ASSAY OF MAGNESIUM: CPT | Performed by: INTERNAL MEDICINE

## 2021-10-20 PROCEDURE — 999N000157 HC STATISTIC RCP TIME EA 10 MIN

## 2021-10-20 PROCEDURE — 999N000015 HC STATISTIC ARTERIAL MONITORING DAILY

## 2021-10-20 PROCEDURE — 83735 ASSAY OF MAGNESIUM: CPT | Performed by: STUDENT IN AN ORGANIZED HEALTH CARE EDUCATION/TRAINING PROGRAM

## 2021-10-20 PROCEDURE — 250N000012 HC RX MED GY IP 250 OP 636 PS 637: Performed by: STUDENT IN AN ORGANIZED HEALTH CARE EDUCATION/TRAINING PROGRAM

## 2021-10-20 PROCEDURE — 250N000009 HC RX 250: Performed by: NURSE PRACTITIONER

## 2021-10-20 PROCEDURE — 85610 PROTHROMBIN TIME: CPT | Performed by: INTERNAL MEDICINE

## 2021-10-20 PROCEDURE — 258N000001 HC RX 258: Performed by: ANESTHESIOLOGY

## 2021-10-20 PROCEDURE — 87205 SMEAR GRAM STAIN: CPT | Performed by: INTERNAL MEDICINE

## 2021-10-20 PROCEDURE — 74018 RADEX ABDOMEN 1 VIEW: CPT

## 2021-10-20 PROCEDURE — 88108 CYTOPATH CONCENTRATE TECH: CPT | Mod: 26 | Performed by: PATHOLOGY

## 2021-10-20 PROCEDURE — 94640 AIRWAY INHALATION TREATMENT: CPT

## 2021-10-20 PROCEDURE — 93971 EXTREMITY STUDY: CPT | Mod: 26 | Performed by: STUDENT IN AN ORGANIZED HEALTH CARE EDUCATION/TRAINING PROGRAM

## 2021-10-20 PROCEDURE — 250N000009 HC RX 250

## 2021-10-20 PROCEDURE — 88108 CYTOPATH CONCENTRATE TECH: CPT | Mod: TC | Performed by: INTERNAL MEDICINE

## 2021-10-20 PROCEDURE — 82803 BLOOD GASES ANY COMBINATION: CPT | Performed by: INTERNAL MEDICINE

## 2021-10-20 PROCEDURE — 87106 FUNGI IDENTIFICATION YEAST: CPT | Performed by: SURGERY

## 2021-10-20 PROCEDURE — 999N000155 HC STATISTIC RAPCV CVP MONITORING

## 2021-10-20 PROCEDURE — 94640 AIRWAY INHALATION TREATMENT: CPT | Mod: 76

## 2021-10-20 PROCEDURE — 87205 SMEAR GRAM STAIN: CPT | Performed by: SURGERY

## 2021-10-20 PROCEDURE — 93970 EXTREMITY STUDY: CPT

## 2021-10-20 PROCEDURE — 85025 COMPLETE CBC W/AUTO DIFF WBC: CPT | Performed by: STUDENT IN AN ORGANIZED HEALTH CARE EDUCATION/TRAINING PROGRAM

## 2021-10-20 PROCEDURE — 31645 BRNCHSC W/THER ASPIR 1ST: CPT | Performed by: INTERNAL MEDICINE

## 2021-10-20 PROCEDURE — 250N000011 HC RX IP 250 OP 636: Performed by: THORACIC SURGERY (CARDIOTHORACIC VASCULAR SURGERY)

## 2021-10-20 PROCEDURE — 74018 RADEX ABDOMEN 1 VIEW: CPT | Mod: 26 | Performed by: STUDENT IN AN ORGANIZED HEALTH CARE EDUCATION/TRAINING PROGRAM

## 2021-10-20 PROCEDURE — 74340 X-RAY GUIDE FOR GI TUBE: CPT

## 2021-10-20 PROCEDURE — 86022 PLATELET ANTIBODIES: CPT | Performed by: ANESTHESIOLOGY

## 2021-10-20 PROCEDURE — 99152 MOD SED SAME PHYS/QHP 5/>YRS: CPT | Performed by: INTERNAL MEDICINE

## 2021-10-20 PROCEDURE — 80048 BASIC METABOLIC PNL TOTAL CA: CPT | Performed by: INTERNAL MEDICINE

## 2021-10-20 PROCEDURE — 80053 COMPREHEN METABOLIC PANEL: CPT | Performed by: INTERNAL MEDICINE

## 2021-10-20 RX ORDER — QUETIAPINE FUMARATE 25 MG/1
25 TABLET, FILM COATED ORAL EVERY 8 HOURS
Status: DISCONTINUED | OUTPATIENT
Start: 2021-10-20 | End: 2021-10-22

## 2021-10-20 RX ORDER — LIDOCAINE HYDROCHLORIDE 10 MG/ML
INJECTION, SOLUTION EPIDURAL; INFILTRATION; INTRACAUDAL; PERINEURAL
Status: COMPLETED
Start: 2021-10-20 | End: 2021-10-20

## 2021-10-20 RX ORDER — ACETAZOLAMIDE 500 MG/5ML
500 INJECTION, POWDER, LYOPHILIZED, FOR SOLUTION INTRAVENOUS ONCE
Status: COMPLETED | OUTPATIENT
Start: 2021-10-20 | End: 2021-10-20

## 2021-10-20 RX ORDER — POTASSIUM CHLORIDE 1.5 G/1.58G
20 POWDER, FOR SOLUTION ORAL ONCE
Status: COMPLETED | OUTPATIENT
Start: 2021-10-20 | End: 2021-10-20

## 2021-10-20 RX ORDER — LIDOCAINE HYDROCHLORIDE 20 MG/ML
5 SOLUTION OROPHARYNGEAL ONCE
Status: COMPLETED | OUTPATIENT
Start: 2021-10-20 | End: 2021-10-20

## 2021-10-20 RX ADMIN — GABAPENTIN 100 MG: 250 SUSPENSION ORAL at 16:01

## 2021-10-20 RX ADMIN — MYCOPHENOLATE MOFETIL 1500 MG: 200 POWDER, FOR SUSPENSION ORAL at 19:35

## 2021-10-20 RX ADMIN — LEVALBUTEROL HYDROCHLORIDE 1.25 MG: 1.25 SOLUTION RESPIRATORY (INHALATION) at 15:55

## 2021-10-20 RX ADMIN — LEVALBUTEROL HYDROCHLORIDE 1.25 MG: 1.25 SOLUTION RESPIRATORY (INHALATION) at 20:55

## 2021-10-20 RX ADMIN — NYSTATIN 1000000 UNITS: 500000 SUSPENSION ORAL at 08:31

## 2021-10-20 RX ADMIN — ACYCLOVIR 400 MG: 200 SUSPENSION ORAL at 08:34

## 2021-10-20 RX ADMIN — TACROLIMUS 4 MG: 5 CAPSULE ORAL at 17:51

## 2021-10-20 RX ADMIN — ESCITALOPRAM 5 MG: 5 TABLET, FILM COATED ORAL at 08:32

## 2021-10-20 RX ADMIN — NYSTATIN 1000000 UNITS: 500000 SUSPENSION ORAL at 16:03

## 2021-10-20 RX ADMIN — BUMETANIDE 2 MG/HR: 0.25 INJECTION INTRAMUSCULAR; INTRAVENOUS at 12:27

## 2021-10-20 RX ADMIN — PROPOFOL 40 MCG/KG/MIN: 10 INJECTION, EMULSION INTRAVENOUS at 00:33

## 2021-10-20 RX ADMIN — PROPOFOL 50 MCG/KG/MIN: 10 INJECTION, EMULSION INTRAVENOUS at 23:15

## 2021-10-20 RX ADMIN — EPINEPHRINE 0.04 MCG/KG/MIN: 1 INJECTION PARENTERAL at 10:37

## 2021-10-20 RX ADMIN — DOCUSATE SODIUM 50 MG AND SENNOSIDES 8.6 MG 1 TABLET: 8.6; 5 TABLET, FILM COATED ORAL at 08:32

## 2021-10-20 RX ADMIN — Medication 5 MG: at 19:35

## 2021-10-20 RX ADMIN — PROPOFOL 35 MCG/KG/MIN: 10 INJECTION, EMULSION INTRAVENOUS at 08:42

## 2021-10-20 RX ADMIN — BUMETANIDE 2 MG/HR: 0.25 INJECTION INTRAMUSCULAR; INTRAVENOUS at 01:45

## 2021-10-20 RX ADMIN — Medication: at 08:37

## 2021-10-20 RX ADMIN — PROPOFOL 30 MCG/KG/MIN: 10 INJECTION, EMULSION INTRAVENOUS at 18:59

## 2021-10-20 RX ADMIN — LIDOCAINE HYDROCHLORIDE 15 ML: 20 SOLUTION ORAL; TOPICAL at 15:23

## 2021-10-20 RX ADMIN — NYSTATIN 1000000 UNITS: 500000 SUSPENSION ORAL at 12:05

## 2021-10-20 RX ADMIN — HUMAN INSULIN 7 UNITS/HR: 100 INJECTION, SOLUTION SUBCUTANEOUS at 18:07

## 2021-10-20 RX ADMIN — Medication 1 PACKET: at 08:31

## 2021-10-20 RX ADMIN — PROPOFOL 35 MCG/KG/MIN: 10 INJECTION, EMULSION INTRAVENOUS at 14:18

## 2021-10-20 RX ADMIN — QUETIAPINE FUMARATE 25 MG: 25 TABLET ORAL at 16:03

## 2021-10-20 RX ADMIN — NYSTATIN 1000000 UNITS: 500000 SUSPENSION ORAL at 19:33

## 2021-10-20 RX ADMIN — LEVOTHYROXINE SODIUM 25 MCG: 0.03 TABLET ORAL at 08:31

## 2021-10-20 RX ADMIN — MYCOPHENOLATE MOFETIL 1500 MG: 200 POWDER, FOR SUSPENSION ORAL at 08:33

## 2021-10-20 RX ADMIN — CEFTAZIDIME 2 G: 2 INJECTION, POWDER, FOR SOLUTION INTRAVENOUS at 23:16

## 2021-10-20 RX ADMIN — PANTOPRAZOLE SODIUM 40 MG: 40 INJECTION, POWDER, FOR SOLUTION INTRAVENOUS at 08:31

## 2021-10-20 RX ADMIN — TACROLIMUS 4 MG: 5 CAPSULE ORAL at 11:07

## 2021-10-20 RX ADMIN — CEFTAZIDIME 2 G: 2 INJECTION, POWDER, FOR SOLUTION INTRAVENOUS at 12:06

## 2021-10-20 RX ADMIN — EPINEPHRINE 0.02 MCG/KG/MIN: 1 INJECTION PARENTERAL at 21:37

## 2021-10-20 RX ADMIN — MULTIVIT AND MINERALS-FERROUS GLUCONATE 9 MG IRON/15 ML ORAL LIQUID 15 ML: at 08:31

## 2021-10-20 RX ADMIN — DEXMEDETOMIDINE 1.2 MCG/KG/HR: 100 INJECTION, SOLUTION, CONCENTRATE INTRAVENOUS at 22:43

## 2021-10-20 RX ADMIN — DEXTROSE MONOHYDRATE 50 ML: 500 INJECTION PARENTERAL at 10:09

## 2021-10-20 RX ADMIN — SODIUM CHLORIDE 20 MG: 9 INJECTION, SOLUTION INTRAVENOUS at 11:07

## 2021-10-20 RX ADMIN — Medication 1 PACKET: at 19:39

## 2021-10-20 RX ADMIN — PANTOPRAZOLE SODIUM 40 MG: 40 INJECTION, POWDER, FOR SOLUTION INTRAVENOUS at 19:34

## 2021-10-20 RX ADMIN — QUETIAPINE FUMARATE 25 MG: 25 TABLET ORAL at 08:42

## 2021-10-20 RX ADMIN — DEXMEDETOMIDINE 1.2 MCG/KG/HR: 100 INJECTION, SOLUTION, CONCENTRATE INTRAVENOUS at 01:17

## 2021-10-20 RX ADMIN — HEPARIN SODIUM 5000 UNITS: 10000 INJECTION, SOLUTION INTRAVENOUS; SUBCUTANEOUS at 19:34

## 2021-10-20 RX ADMIN — AMIODARONE HYDROCHLORIDE 0.5 MG/MIN: 50 INJECTION, SOLUTION INTRAVENOUS at 09:21

## 2021-10-20 RX ADMIN — GABAPENTIN 100 MG: 250 SUSPENSION ORAL at 08:37

## 2021-10-20 RX ADMIN — BUMETANIDE 2 MG/HR: 0.25 INJECTION INTRAMUSCULAR; INTRAVENOUS at 21:02

## 2021-10-20 RX ADMIN — LEVALBUTEROL HYDROCHLORIDE 1.25 MG: 1.25 SOLUTION RESPIRATORY (INHALATION) at 08:02

## 2021-10-20 RX ADMIN — PREDNISOLONE 17.5 MG: 15 SOLUTION ORAL at 08:33

## 2021-10-20 RX ADMIN — HEPARIN SODIUM 5000 UNITS: 10000 INJECTION, SOLUTION INTRAVENOUS; SUBCUTANEOUS at 04:53

## 2021-10-20 RX ADMIN — DEXMEDETOMIDINE 1 MCG/KG/HR: 100 INJECTION, SOLUTION, CONCENTRATE INTRAVENOUS at 17:48

## 2021-10-20 RX ADMIN — DEXMEDETOMIDINE 1.2 MCG/KG/HR: 100 INJECTION, SOLUTION, CONCENTRATE INTRAVENOUS at 07:04

## 2021-10-20 RX ADMIN — ACETAZOLAMIDE 500 MG: 500 INJECTION, POWDER, LYOPHILIZED, FOR SOLUTION INTRAVENOUS at 13:10

## 2021-10-20 RX ADMIN — ACETYLCYSTEINE 2 ML: 200 SOLUTION ORAL; RESPIRATORY (INHALATION) at 20:55

## 2021-10-20 RX ADMIN — ACYCLOVIR 400 MG: 200 SUSPENSION ORAL at 19:35

## 2021-10-20 RX ADMIN — GABAPENTIN 100 MG: 250 SUSPENSION ORAL at 19:34

## 2021-10-20 RX ADMIN — ACETYLCYSTEINE 2 ML: 200 SOLUTION ORAL; RESPIRATORY (INHALATION) at 15:55

## 2021-10-20 RX ADMIN — ACETYLCYSTEINE 2 ML: 200 SOLUTION ORAL; RESPIRATORY (INHALATION) at 12:02

## 2021-10-20 RX ADMIN — POTASSIUM CHLORIDE 20 MEQ: 40 SOLUTION ORAL at 08:42

## 2021-10-20 RX ADMIN — POTASSIUM CHLORIDE 20 MEQ: 1.5 POWDER, FOR SOLUTION ORAL at 01:45

## 2021-10-20 RX ADMIN — Medication 150 MCG/HR: at 09:00

## 2021-10-20 RX ADMIN — PREDNISOLONE 17.5 MG: 15 SOLUTION ORAL at 19:40

## 2021-10-20 RX ADMIN — HUMAN INSULIN 6 UNITS/HR: 100 INJECTION, SOLUTION SUBCUTANEOUS at 05:36

## 2021-10-20 RX ADMIN — LEVALBUTEROL HYDROCHLORIDE 1.25 MG: 1.25 SOLUTION RESPIRATORY (INHALATION) at 12:02

## 2021-10-20 RX ADMIN — ACETYLCYSTEINE 2 ML: 200 SOLUTION ORAL; RESPIRATORY (INHALATION) at 08:02

## 2021-10-20 RX ADMIN — LIDOCAINE HYDROCHLORIDE 50 MG: 10 INJECTION, SOLUTION EPIDURAL; INFILTRATION; INTRACAUDAL; PERINEURAL at 15:14

## 2021-10-20 RX ADMIN — ACETAZOLAMIDE 500 MG: 500 INJECTION, POWDER, LYOPHILIZED, FOR SOLUTION INTRAVENOUS at 19:40

## 2021-10-20 ASSESSMENT — ACTIVITIES OF DAILY LIVING (ADL)
ADLS_ACUITY_SCORE: 18

## 2021-10-20 ASSESSMENT — MIFFLIN-ST. JEOR: SCORE: 1509

## 2021-10-20 NOTE — PROGRESS NOTES
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    OVERNIGHT EVENTS:  Continues to have hypertensive episodes when weaning off sedation      TODAY'S PROGRESS:   - Continue Bumex 2 mg/hr- I/O goal: -500 ml- 1L  - Goal CI>2, wean Epi  - Seroquel added for agitation  - Bronch today  - Follow Fungitel  - Weaning Caroline  - Follow HIT panel and DVT US    PLAN:  Neuro/ pain/ sedation:  Acute Postoperative pain  Anxiety  Depression  - Monitor neurological status. Notify the MD for any acute changes in exam.  - Pain: Fentanyl gtt. Tylenol, Robaxin, Gabapentin ramses. PRN oxycodone, IV dilaudid,  - Sedation: Propofol, Dexmedetomdine gtt  - Agitation: Seroquel 25 mg q8  - Off Paralysis.  - Continue PTA Escitalopram, Melatonin     Pulmonary care:   Acute Hypoxic Respiratory Failure  S/p B/l lung transplant  SALTY on home CPAP  - Ventilator Settings:CMV- 22/400/10/50  - Caroline- 10, wean as tolerated  - Bronchoscopy(10/17) did not show much secretions, edema +  - Levo-albuterol nebs and Mucomyst  - Chest Physiotherapy  - Daily CXR  - Titrate supplemental oxygen to maintain saturation above 92%.  - Pulmonary hygiene: Incentive spirometer every 15- 30 minutes when awake, flutter valve, C&DB  - Diuresis: Bumex infusion 2 mg/hr  - Bronchoscopy today     Cardiovascular:    S/p b/l lung transplant on 10/16/21 by Dr. Corral  HTN  A.fib- resolved  Cardiogenic Shock  Recent echo on 09/07/21 with LVEF of 60-65%. Pulmonary HTN.     - Cardiac Cath(9/8/21)-     Right sided filling pressures are mildly elevated.    Moderately elevated pulmonary artery hypertension.    Left sided filling pressures are normal.    Normal  cardiac output level.    Normal coronary arteries with mild tortuosity     - Monitor hemodynamic status.   - Lactic Acid: 1.9  - Goal MAP >65  - Pressors: Epi gtt. Goal CI>2  - Statin: none  - ASA: none  - PTA meds: Amlodipine withheld  - Amiodarone infusion- 0.5 mg/min  - EKG tomorrow to assess QT interval           GI care/ Nutrition:   Elevated LFTs- resolving  - Diet: NJ feeds. Advance feeds  - PPI  - Continue bowel regimen: miralax, senna  - Trend LFTs    Renal/ Fluid Balance/ Electrolytes:   Acute Kidney Injury  BL creat appears to be ~ 0.7   - Diuresis: Bumex infusion 2 mg/hr with I/O goal: -500 to 1 L  - Strict I/O, daily weights  - Avoid/limit nephrotoxins as able  - Replete lytes PRN per protocol     Endocrine:    Stress induced hyperglycemia  Hypothyroidism  DM  RA  Preop A1c -6.6  - Insulin gtt  - Goal BG <180 for optimal healing  - PTA meds: Solumedrol 125 mg q6, Synthroid 25 mcg  - Received 2 doses of Rituximab in 6/21  - Rheumatology consult, will reach out to them on Monday     ID/ Antibiotics:  Immunosuppression  Stress induced leukocytosis  - pre-op WBC -22.9  - Completed perioperative regimen- Ceftazidime, Vancomycin  - Ceftazidime restarted(10/19-)  - Send Fungitel  - Nystatin, Acyclovir  - Tacrolimus  - Basiliximab on POD#4(10/20/21)  - Methylpred followed by oral prednisone  - Continue to monitor fever curve, WBC and inflammatory markers as appropriate  - Follow up cultures  - (PTA Bactrim, Cefepime, Doxycycline)  -Azathioprine to be avoided after transplant given low TPMT based on recommendations of transplant pulmonology, however if used, then azathioprine to be used in a low-dose     Heme:     Acute blood loss anemia  Hypogammaglobulinemia  Theombocytopenia  Pre-op Hb- 11.1, Platelet- 224  HIT panel sent  Consider IVIG tomorrow  No s/sx active bleeding  - CBC in AM  - ID as above     MSK/ Skin:  Sternotomy  Surgical Incision  - Sternal precautions  - Postoperative incision management per  protocol  - PT/OT/CR     Prophylaxis:    - Mechanical prophylaxis for DVT: SCDs  - Chemical DVT prophylaxis: Heparin subcutaneous held  - PPI for 30 days postoperatively     Lines/ tubes/ drains:  - Right PICC(pre-op)  - Right radial A line(10/16)  - Right CVC(10/16)  - Right Franklin(10/16)  - ETT(10/16)  - Watson(10/16)  - Chest tubes- 4 pleural, 1 mediastinal(10/16)  - OG(10/16)   - NJ(10/18)     Disposition:  - [facility] ICU.      Patient seen, findings and plan discussed with CV ICU staff. Will consider Trach if continues to be intubated till Monday.     Eunice Gutiérrez MD  PGY-3  Anesthesia    ====================================    SUBJECTIVE:   Patient is sedated and intubated. Weaning paralysis and Caroline today. Continues to be dependent on ventilator.    OBJECTIVE:   1. VITAL SIGNS:   Temp:  [97.2  F (36.2  C)-101.1  F (38.4  C)] 99.1  F (37.3  C)  Pulse:  [56-98] 70  Resp:  [18-24] 22  MAP:  [61 mmHg-293 mmHg] 62 mmHg  Arterial Line BP: ()/() 102/49  FiO2 (%):  [40 %-50 %] 50 %  SpO2:  [93 %-100 %] 99 %  Ventilation Mode: CMV/AC  (Continuous Mandatory Ventilation/ Assist Control)  FiO2 (%): 50 %  Rate Set (breaths/minute): 22 breaths/min  Tidal Volume Set (mL): 400 mL  PEEP (cm H2O): 10 cmH2O  Oxygen Concentration (%): 50 %  Resp: 22      2. INTAKE/ OUTPUT:   I/O last 3 completed shifts:  In: 4382.1 [I.V.:2342.1; NG/GT:480]  Out: 3921 [Urine:3191; Chest Tube:730]    3. PHYSICAL EXAMINATION:   General: sedated and intubated  Neuro: can't be assessed  Resp: ventilator dependent  CV: RRR  Abdomen: Soft, Non-distended, Non-tender  Incisions: c/d/i  Extremities: warm and well perfused    4. INVESTIGATIONS:   Arterial Blood Gases   Recent Labs   Lab 10/20/21  0308 10/19/21  2344 10/19/21  1559 10/19/21  1432   PH 7.50* 7.47* 7.43 7.39   PCO2 45 47* 49* 50*   PO2 151* 142* 84 95   HCO3 35* 34* 32* 30*     Complete Blood Count   Recent Labs   Lab 10/20/21  0308 10/19/21  1008 10/19/21  0338 10/19/21  0154 10/18/21  1718  10/18/21  0403 10/17/21  1212 10/17/21  0346   WBC 27.4*  --  32.4*  --   --  44.5*  --  39.9*   HGB 8.4* 9.0* 7.6* 7.6*   < > 8.7*   < > 11.2*   *  --  126*  --   --  211  --  186    < > = values in this interval not displayed.     Basic Metabolic Panel  Recent Labs   Lab 10/20/21  0418 10/20/21  0316 10/20/21  0308 10/20/21  0214 10/20/21  0005 10/19/21  2345 10/19/21  2230 10/19/21  2224 10/19/21  1704 10/19/21  1559 10/19/21  1106 10/19/21  1008   NA  --   --  143  --   --   --   --  145*  --  144  --  144   POTASSIUM  --   --  4.1  --   --  3.5  --  3.5  --  4.0   < > 4.1   CHLORIDE  --   --  106  --   --   --   --  110*  --  109  --  108   CO2  --   --  34*  --   --   --   --  29  --  29  --  27   BUN  --   --  50*  --   --   --   --  50*  --  48*  --  50*   CR  --   --  1.64*  --   --   --   --  1.69*  --  1.76*  --  1.87*   * 180* 175* 168*   < >  --    < > 120*   < > 146*   < > 169*  165*    < > = values in this interval not displayed.     Liver Function Tests  Recent Labs   Lab 10/20/21  0308 10/19/21  2224 10/19/21  1559 10/19/21  1008 10/19/21  0338 10/19/21  0338 10/18/21  2202 10/18/21  1614   * 172* 207* 254*   < > 308*   < > 363*   * 914* 1,016* 1,096*   < > 1,064*   < > 1,080*   ALKPHOS 206* 196* 182* 178*   < > 142   < > 123   BILITOTAL 0.4 0.6 0.5 0.6   < > 0.5   < > 0.7   ALBUMIN 1.5* 1.3* 1.3* 1.4*   < > 1.5*   < > 1.5*   INR 1.25*  --  1.30*  --   --  1.41*  --  1.49*    < > = values in this interval not displayed.     Pancreatic Enzymes  No lab results found in last 7 days.  Coagulation Profile  Recent Labs   Lab 10/20/21  0308 10/19/21  1559 10/19/21  0338 10/18/21  1614 10/17/21  1535 10/17/21  0346 10/16/21  2032 10/16/21  2032 10/16/21  1535 10/16/21  1535 10/15/21  0907 10/15/21  0907   INR 1.25* 1.30* 1.41* 1.49*   < > 1.77*   < > 1.54*   < > 1.38*   < > 1.02   PTT  --   --   --   --   --  41*  --  42*  --  51*  --  30    < > = values in this interval not  displayed.         5. RADIOLOGY:   Recent Results (from the past 24 hour(s))   US Upper Extremity Venous Duplex Right Portable    Narrative    EXAMINATION: DOPPLER VENOUS ULTRASOUND OF THE RIGHT UPPER EXTREMITY,  10/19/2021 9:53 AM     COMPARISON: None.    HISTORY: Right upper extremity swelling    TECHNIQUE:  Gray-scale evaluation with compression, spectral flow and  color Doppler assessment of the deep venous system of the right upper  extremity.    FINDINGS:  Normal blood flow and waveforms are demonstrated in the internal  jugular vein and there is no evidence of echogenic thrombus within the  lumen. There is normal compressibility of the brachial, basilic and  cephalic veins.  The right innominate, subclavian, and axillary veins are obscured due  to subcutaneous emphysema.    Right internal jugular central catheter in place without evidence of  echogenic thrombus in the lumen.  Right basilic venous approach PICC line starting in the antecubital  fossa.      Impression    IMPRESSION:  1.  No sonographic evidence of left upper extremity deep venous  thrombosis.    I have personally reviewed the examination and initial interpretation  and I agree with the findings.    JODI MENDEZ MD         SYSTEM ID:  [id number]   XR Chest Port 1 View    Narrative    Chest one view portable    HISTORY: Chest tube output increased    COMPARISON STUDY: Same day at 00 52    FINDINGS: Changes of bilateral lung transplant. Endotracheal tube,  bilateral chest tubes, clamshell sternotomy, feeding tube, NG tube,  right IJ [place] catheter unchanged. Right PICC in the SVC with tip  not well visualized from overlying catheters. Mediastinal drain in  place. Cardiac silhouette is enlarged. Interstitial opacities  bilaterally unchanged. Subcutaneous emphysema bilaterally unchanged.  Bibasilar atelectasis.      Impression    IMPRESSION: Interstitial edema and bibasilar atelectasis.    DAVIN ANGUIANO MD         SYSTEM ID:  [id number]        =========================================

## 2021-10-20 NOTE — PLAN OF CARE
Major Shift Events:  Pt sedated, when sedation weaned, even slightly RR in the 50s and hyptertensive 200/100's, when weaned pt opens eyes but does not follow any commands.  Lung sounds course throughout, bronch done today, vent settings unchanged.  SVO2 59 Co4.9 CI 2.7 SVR 1056 CVP 11 PA 34/12.  Working on weaning Nitric, having to increase epi to maintain map >65, SR. Pt has large urine ouput today, bumex drip remains on.  2 loose bowel movements today.  OG connected to LIS and got 500 out right away, MD held tube feeding, abdominal xray, IR advanced NJ as it was coiled, resumed tube feedings at goal. BS very high and very low today, D50 given x1, insulin held and then back on in the afternoon after tube feedings resumed.   Plan: Plan to continue to wean nitric to 1 and then leave it at 1 until tomorrow day shift.  Try to wean sedation if able.   For vital signs and complete assessments, please see documentation flowsheets.    Problem: Adult Inpatient Plan of Care  Goal: Optimal Comfort and Wellbeing  Outcome: No Change

## 2021-10-20 NOTE — PLAN OF CARE
Major Shift Events:  Difficulty weaning sedation overnight. Prop weaned to 30. Pt not responsive or following commands. But, with turns or laying flat pt becomes hypertensive and very dyssynchronous with vent. Sedation as follows: Prop 30, Fent 150, dex 1.2. Pt still at RASS -5. Pt on 0.02 of Epi MAPs sustaining above goal of 65, unable to wean off Epi yet. Pa pressures better overnight, CVP ok. Ci-2.5, CO 4.7. Pt febrile overnight Tmax 101.3. Pt continues to be dyssynchronous with the vent at times but mixed venous gases look ok. Fair amount of creamy thick secretions. Nitric remains at 11. Tube feeds at 60, OG in place and clamped. Bumex gtt at 2, metzger in place, good output. Chest tube output stable. Pt pan cultured overnight.   Plan: Try to wean sedation, possibly wean nitric.   For vital signs and complete assessments, please see documentation flowsheets.    Problem: Adult Inpatient Plan of Care  Goal: Plan of Care Review  Outcome: No Change

## 2021-10-20 NOTE — PROGRESS NOTES
"Bronchoscopy Risk Assessment Guidelines      A. Patient symptoms to consider when assessing pulmonary TB risk are:    I. Cough greater than 3 weeks; and fever, hemoptysis, pleuritic chest    pain, weight loss greater than 10 lbs, night sweats, fatigue, infiltrates on    upper lobes or superior segments of lower lobes, cavitation on chest    x-ray.   B. Patient risk factors to consider when assessing pulmonary TB risk are:    I. Exposure to known TB case, foreign-born persons (within 5 years of    arrival to [place]), residence in a crowded setting (correctional facility,     long-term care center, etc.), persons with HIV or immunosuppression.    Patients with symptoms and risk factors should generally be considered \"suspect risk\" and bronchoscopies should be performed in airborne precautions.    This patient has NO KNOWN RISK of Tuberculosis (proceed with bronchoscopy)    Specimens sent: yes  Complications: None  Scope used: #3237500 Adult  Attending Physician: Dr. Mike Felipe, RT on 10/20/2021 at 3:28 PM  "

## 2021-10-20 NOTE — PROGRESS NOTES
Pulmonary Medicine  Cystic Fibrosis - Lung Transplant Team  Progress Note  10/20/2021       Patient: Edson Thornton  MRN: [id number]  : 1965 (age 56 year old)  Transplant: 10/16/2021 (Lung), POD#4  Admission date: 2021    Assessment & Plan:     Edson Thornton is a 56 year old male with a PMH significant for NSIP/ILD, bronchiectasis, moderate PH, RA, SALTY, chronic HSV infection, hypogammaglobulinemia, steroid-induced diabetes, hypothyroidism, HTN, HLD, duodenal anomaly, anxiety, and depression.  Admitted on 21 from OSH for acute on chronic respiratory failure 2/2 ILD exacerbation.  Pt. is now s/p BSLT on 10/16/21.  Surgery relatively uncomplicated but pt. with low cardiac index and PGD immediately post-op.  The patient remains intubated on Caroline in the CVICU.  Afib with RVR noted 10/18 and also with BRENNAN.     Today's recommendations:   - Bronch this afternoon  - Agree with diuresis for net negative 500 mL to 1L  - CXR daily as below, defer chest CT non-contrast today  - Await explant pathology (should include mediastinal LN)  - Repeat basiliximab today (ordered)  - Tacrolimus level to trend therapeutic, continue current dose and change to suspension via OG tube, continue daily levels (ordered)  - Prednisone taper 10/24 (ordered)  - Continue to hold Bactrim  - CMV and EBV  (not yet ordered)  - Follow pending cultures (10/20), continue empiric ceftazidime, defer vancomycin and antifungal coverage pending BDG fungitell/cultures   - Coccidioides blood Ag (10/17) pending  - IVIG indicated, plan to give tomorrow (not yet ordered)  - DSA ordered 10/24  - Donor risk labs ordered 11/15     S/p BSLT for ILD:  Acute hypoxic/hypercapneic respiratory failure: Patient is currently on epinephrine for low CI.  Remains intubated on CMV 22//10/50, continues on Caroline at 10, cisatracurium weaned off 10/19.  Unfortunately had not received vaccination for flu, PNA, or COVID-19 PTA (not available after admission d/t  immunosuppression state).  POD #1 CXR with new RLL focal opacity and marked increase in diffuse interstitial opacities concerning for pulmonary edema.  Started on Bumex drip and ABX resumed as below 10/19.   - Nebs: levalbuterol and Mucomyst QID  - Aggressive pulmonary toilet with chest physiotherapy QID (addition of Aerobika and incentive spirometry once extubated)  - Ventilator and Caroline management per ICU team  - Bronch this afternoon with Dr. Mckeon  - Anesthesia to place erector spinae catheters prior to anticipated extubation per our routine, deferred today  - Chest tubes managed by surgical team  - Agree with diuresis for net negative 500 mL to 1L  - CXR today with increased left sided opacities (personally reviewed with Dr. Mckeon)  - Defer chest CT non-contrast today, revisit daily  - S/p post-pyloric nasal FT placement by radiology 10/18, TF management per RD and ICU team  - Recommend SLP consult as pt. nears extubation for swallowing evaluation  - Await explant pathology (should include mediastinal LN)     Immunosuppression:  - Induction therapy with basiliximab (and high dose IV steroid) given intraoperatively, repeating basiliximab dose on POD#4 (ordered)  - Tacrolimus suspension via OG tube given return of bowel function and concern for adequate administration of SL.  Goal tacrolimus level 8-12.  Daily tacrolimus levels for the first 1-2 weeks with dose adjustments prn based on levels and changes in medications/patient condition.  See below for initial levels and dosing trends:  Date [place]ro Level Intervention   10/18 <3 Dose increased from 1 mg to 2 mg BID   10/19 <3  Dose increased to 4 mg BID   10/20  8  Changed from SL to suspension 4 mg BID via OG tube   - MMF 1500 mg BID (on PTA, AZA to be avoided given TPMT)  - Prednisolone 17.5 mg BID with next taper on 10/24 (ordered) per lung transplant protocol:  Date AM dose (mg) PM dose (mg)   10/17/21 17.5 17.5   10/24/21 15 15   10/31/21 15 12.5   11/7/21 12.5  12.5   11/21/21 12.5 10   12/5/21 10 10   1/2/22 10 7.5   1/30/22 7.5 7.5   2/27/22 7.5 5   3/27/22 5 5   4/24/22 5 2.5      Prophylaxis:   - Bactrim for PJP ppx deferred post-op pending assessment of renal function  - Nystatin for oral candidiasis ppx, 6 month course  - See below for serologies and viral ppx:    Donor Recipient Intervention   CMV status Negative Negative None   EBV status Positive Positive N/A, EBV monthly (11/16, not yet ordered)   HSV status N/A Positive Acyclovir POD #1-30   (recent infection history)      Primary graft dysfunction (per ISHLT guidelines):  See chart below:    POD #0  (~0 hours) POD #1  (~24 hours) POD #2   (~48 hours) POD#3   (~72 hours)   Date 10/16 10/17 10/18 10/19   Time 1536 1537  1629 1559   Intubated Y Y Y Y   PaO2 227 108  116  84   FiO2 100 100  60  50   P/F Ratio 227 108  193  168   PGD Grade   (0=mild, 3=severe) 2 3  3  3   ECMO N N N N   Inhaled NO/Flolan Y Y Y Y   ISHLT PGD Scoring  Grade 0 - PaO2/FiO2 >300 and normal chest radiograph (absence of diffuse allograft infiltrates)  Grade 1 - PaO2/FiO2 >300 and diffuse allograft infiltrates on chest radiograph  Grade 2 - PaO2/FiO2 between 200 and 300  Grade 3 - PaO2/FiO2 <200 and/or on ECMO     ID: Concern for possible Strongyloides exposure pre-transplant s/p ivermectin x1 dose (9/17).  Fevers post-op, Tmax 101.7 POD #1.  S/p IV ceftaz/vancomycin for 48h per protocol.  Febrile again overnight 10/19 to 10/20.  - Donor cultures NGTD (UNOS data personally reviewed this morning)  - Recipient cultures NGTD (including fungal and AFB)   - Blood cultures (10/17) NGTD, repeat (10/20) pending  - Bronch cultures (10/17) with Staph epidermidis and Candida albicans  - Sputum culture (10/17) with normal jean marie, repeat (10/20) with GPC and yeast  - Urine culture (10/20) pending  - BDG fungitell (10/20) pending  - Monthly Coccidioides Ag x12 months post-transplant per ID (urine negative, serum pending 10/17)  - ABX: ceftazidime (resumed  10/19 given fevers), defer vancomycin and antifungal coverage pending BDG fungitell/cultures  - Low threshold for transplant ID consult given pre-transplant ID history     HSV: Chronic intermittent active infection pre-transplant with recent HSV infection: crusted lesions throughout left side of jaw, s/p 10 day treatment course of ACV through 10/9.  HSV PCR blood negative 10/17.  - ACV ppx as above (starting on POD #1 instead of POD #8 given HSV history and location)     Hypogammaglobulinemia: IgG previously low at 364 (9/7).  Noted at 265 at time of transplant (10/15).  - Plan to give IVIG with premedication when appropriate, plan to give tomorrow (not yet ordered)     Positive cross match: Note that he received two doses of rituximab in June, which is likely contributing to cross match result.  DSA negative 10/17.  - Repeat DSA weekly x2 then q2 weeks (due 10/24, ordered)     PHS risk criteria donor:  Additional labs required post-transplant (between 4-8 weeks post-op): Hepatitis B, Hepatitis C, and HIV by VISHAL ([id number], ordered POD #30).     SALTY: Noted pre-transplant. Home CPAP 6-12 cmH2)  - Resume BiPAP at night post-extubation     Other relevant problems being managed by primary team:     Afib with RVR: Noted 10/18, started on amiodarone drip.  Currently in sinus rhythm.    - Management per ICU team     Elevated LFTs: Possible shock liver post-op.  Initial ALT//230 evening of surgery, then with marked increase to 1550/1246 POD #1.  Gradual improvement in ALT/AST although alk phos rising.  - Daily LFTs     BRENNAN: Baseline creatinine 0.7, increased to 1.2 POD #1 and further increased to 2.05 POD #2.  S/p 3.5 L volume resuscitation the first 12 hours post-op for rising lactic acid (peak 9).  UO also downtrending.  Now improving with Bumex drip.  - Bactrim held as above  - Management per ICU team    We appreciate the excellent care provided by the CVICU and CVTS teams.  Recommendations communicated via in  "person rounding and this note.  Will continue to follow along closely, please do not hesitate to call with any questions or concerns.    Patient seen and discussed with Dr. Mckeon.    Keyla Rice PA-C  Inpatient [facility]  Pulmonary CF/Transplant     Subjective & Interval History:     Paralytic weaned off yesterday.  Remains on propofol, Precedex, and fentanyl.  Difficulty with weaning sedation due to hypertension and vent dyssynchrony with turns.  Remains on epinephrine.  Intubated with CMV settings 22/400/10/50, Caroline 10.  Received chest physiotherapy TID yesterday.  Fair amount of creamy secretions via ETT.  Tmax 101.1.  Improvement in urine output and Cr with Bumex drip.  TF at goal via NJ tube, having BMs overnight and this morning.    Review of Systems:     ROS as above otherwise limited due to intubation and sedation.    Physical Exam:     Vital signs:  Temp: 99.1  F (37.3  C) Temp src: Bladder   Pulse: 72   Resp: 22 SpO2: 99 % O2 Device: Mechanical Ventilator Oxygen Delivery: 15 LPM Height: 162.6 cm (5' 4\") Weight: 76.8 kg (169 lb 5 oz)  I/O:     Intake/Output Summary (Last 24 hours) at 10/20/2021 0741  Last data filed at 10/20/2021 0700  Gross per 24 hour   Intake 3968.69 ml   Output 5294 ml   Net -1325.31 ml     Constitutional: Lying in bed, in no apparent distress.   HEENT: Facial edema.  Eyes with pink conjunctivae, anicteric.  Orally intubated via ETT.  PULM: Mildly diminished air flow to bases bilaterally.  Coarse crackles t/o.  No rhonchi, no wheezes.  Intermittent labored breathing on full vent support.  CV: Normal S1 and S2.  RRR.  No murmur, gallop, or rub.  No peripheral edema.   ABD: NABS, soft, nondistended.    MSK: Moves all extremities.    NEURO: Sedated.   SKIN: Warm, dry.  No rash on limited exam.   PSYCH: Calm.     Lines, Drains, and Devices:  PICC Triple Lumen Right  (Active)   Site Assessment WDL 10/20/21 0400   External Cath Length (cm) 0 cm 10/11/21 1140   Dressing Intervention Chlorhexidine " patch;Transparent 10/20/21 0400   Dressing Change Due 10/24/21 10/19/21 2000   PICC Comment CDI 10/19/21 2000   Mosley - Status infusing 10/20/21 0400   Mosley - Cap Change Due 10/22/21 10/19/21 2000   Red - Status infusing 10/20/21 0400   Red - Cap Change Due 10/22/21 10/19/21 2000   White - Status infusing 10/20/21 0400   White - Cap Change Due 10/22/21 10/19/21 2000   Extravasation? No 10/20/21 0000   Line Necessity Yes, meets criteria 10/20/21 0400   Number of days:        Arterial Line 10/16/21 (Active)   Site Assessment WDL 10/20/21 0400   Line Status Pulsatile blood flow 10/20/21 0400   Arterine Line Cap Change Due 10/22/21 10/19/21 2000   Art Line Waveform Appropriate 10/20/21 0400   Art Line Interventions Connections checked and tightened;Leveled 10/20/21 0400   Color/Movement/Sensation Capillary refill less than 3 sec 10/20/21 0400   Line Necessity Yes, meets criteria 10/20/21 0400   Dressing Type Transparent 10/20/21 0400   Dressing Status Clean, dry, intact 10/20/21 0400   Dressing Intervention Dressing changed/new dressing 10/17/21 0400   Dressing Change Due 10/24/21 10/20/21 0400   Number of days: 4       CVC Double Lumen Right Internal jugular (Active)   Site Assessment WDL 10/20/21 0400   Dressing Type Chlorhexidine sponge;Transparent 10/20/21 0400   Dressing Status clean;dry;intact 10/20/21 0400   Dressing Intervention new dressing;dressing changed 10/17/21 0000   Dressing Change Due 10/24/21 10/19/21 2000   Line Necessity yes, meets criteria 10/19/21 2000   Brown - Status saline locked 10/20/21 0400   Brown - Cap Change Due 10/22/21 10/19/21 2000   White - Status infusing 10/20/21 0400   White - Cap Change Due 10/22/21 10/19/21 2000   Phlebitis Scale 0-->no symptoms 10/20/21 0400   Infiltration? no 10/20/21 0400   CVC Comment MAC 10/20/21 0400   Number of days: 4       Pulmonary Artery Catheter - Triple Lumen 10/16/21 1500 Right internal jugular vein (Active)   Dressing dressing dry and intact 10/20/21  0400   Securement secured with sutures 10/20/21 0400   PA Catheter Markings (cm) 47 10/19/21 2000   Phlebitis 0-->no symptoms 10/20/21 0400   Infiltration 0-->no symptoms 10/20/21 0400   Waveform normal 10/20/21 0400   Lumen A - Color WHITE 10/20/21 0400   Lumen A - Status transduced 10/20/21 0400   Lumen A - Cap Change Due 10/22/21 10/19/21 2000   Lumen B - Color YELLOW 10/20/21 0400   Lumen B - Status transduced 10/20/21 0400   Lumen C - Color BLUE 10/20/21 0400   Lumen C - Status infusing 10/20/21 0400   Lumen C - Cap Change Due 10/22/21 10/19/21 2000   Number of days: 4     Data:     LABS    CMP:   Recent Labs   Lab 10/20/21  0626 10/20/21  0418 10/20/21  0316 10/20/21  0308 10/20/21  0005 10/19/21  2345 10/19/21  2230 10/19/21  2224 10/19/21  1704 10/19/21  1559 10/19/21  1106 10/19/21  1008 10/19/21  0341 10/19/21  0338 10/18/21  1621 10/18/21  1614   NA  --   --   --  143  --   --   --  145*  --  144  --  144   < > 142   < > 143   POTASSIUM  --   --   --  4.1  --  3.5  --  3.5  --  4.0   < > 4.1   < > 4.2   < > 4.1   CHLORIDE  --   --   --  106  --   --   --  110*  --  109  --  108   < > 106   < > 107   CO2  --   --   --  34*  --   --   --  29  --  29  --  27   < > 28   < > 28   ANIONGAP  --   --   --  3  --   --   --  6  --  6  --  9   < > 8   < > 8   * 157* 180* 175*   < >  --    < > 120*   < > 146*   < > 169*  165*   < > 181*   < > 228*   BUN  --   --   --  50*  --   --   --  50*  --  48*  --  50*   < > 48*   < > 42*   CR  --   --   --  1.64*  --   --   --  1.69*  --  1.76*  --  1.87*   < > 2.05*   < > 1.68*   GFRESTIMATED  --   --   --  46*  --   --   --  44*  --  42*  --  39*   < > 35*   < > 45*   ERIK  --   --   --  7.8*  --   --   --  7.2*  --  7.4*  --  7.5*   < > 7.9*   < > 7.8*   MAG  --   --   --  2.2  --   --   --   --   --  2.4*  --   --   --  2.7*  --  2.4*   PHOS  --   --   --  3.4  --   --   --   --   --  4.5  --   --   --  5.8*  --  4.3   PROTTOTAL  --   --   --  4.4*  --   --   --   4.2*  --  4.1*  --  4.1*   < > 4.2*   < > 4.2*   ALBUMIN  --   --   --  1.5*  --   --   --  1.3*  --  1.3*  --  1.4*   < > 1.5*   < > 1.5*   BILITOTAL  --   --   --  0.4  --   --   --  0.6  --  0.5  --  0.6   < > 0.5   < > 0.7   ALKPHOS  --   --   --  206*  --   --   --  196*  --  182*  --  178*   < > 142   < > 123   AST  --   --   --  146*  --   --   --  172*  --  207*  --  254*   < > 308*   < > 363*   ALT  --   --   --  852*  --   --   --  914*  --  1,016*  --  1,096*   < > 1,064*   < > 1,080*    < > = values in this interval not displayed.     CBC:   Recent Labs   Lab 10/20/21  0308 10/19/21  1008 10/19/21  0338 10/19/21  0154 10/18/21  1718 10/18/21  0403 10/17/21  1212 10/17/21  0346   WBC 27.4*  --  32.4*  --   --  44.5*  --  39.9*   RBC 2.77*  --  2.47*  --   --  2.93*  --  3.71*   HGB 8.4* 9.0* 7.6* 7.6*   < > 8.7*   < > 11.2*   HCT 26.0*  --  23.2*  --   --  26.5*  --  32.8*   MCV 94  --  94  --   --  90  --  88   MCH 30.3  --  30.8  --   --  29.7  --  30.2   MCHC 32.3  --  32.8  --   --  32.8  --  34.1   RDW 16.2*  --  16.4*  --   --  17.0*  --  16.6*   *  --  126*  --   --  211  --  186    < > = values in this interval not displayed.       INR:   Recent Labs   Lab 10/20/21  0308 10/19/21  1559 10/19/21  0338 10/18/21  1614   INR 1.25* 1.30* 1.41* 1.49*       Glucose:   Recent Labs   Lab 10/20/21  0626 10/20/21  0418 10/20/21  0316 10/20/21  0308 10/20/21  0214 10/20/21  0005   * 157* 180* 175* 168* 129*       Blood Gas:   Recent Labs   Lab 10/20/21  0412 10/20/21  0308 10/19/21  2345 10/19/21  2344 10/19/21  1931   PHV 7.40  --  7.42  --  7.42   PCO2V 58*  --  55*  --  55*   PO2V 32  --  33  --  32   HCO3V 36*  --  36*  --  36*   LORI 9.8*  --  10.0*  --  9.8*   O2PER 50 50 50   < > 50    < > = values in this interval not displayed.       Culture Data No results for input(s): CULT in the last 168 hours.    Virology Data:   Lab Results   Component Value Date    FLUAH1 Negative 10/17/2021     FLUAH3 Negative 10/17/2021    [id number] Negative 10/17/2021    IFLUB Negative 10/17/2021    RSVA Negative 10/17/2021    RSVB Negative 10/17/2021    PIV1 Negative 10/17/2021    PIV2 Negative 10/17/2021    PIV3 Negative 10/17/2021    HMPV Negative 10/17/2021    HRVS Negative 10/17/2021    ADVBE Negative 10/17/2021    ADVC Negative 10/17/2021       Historical CMV results (last 3 of prior testing):  No results found for: CMVQNT  No results found for: CMVLOG    Urine Studies    Recent Labs   Lab Test 10/17/21  1642 10/17/21  1041   URINEPH 5.0 5.0   NITRITE Negative Negative   LEUKEST Negative Trace*   WBCU 25* 44*       Most Recent [place] Pulmonary Function Testing (FVC/FEV1 only)  No results found for: 20002  No results found for: 20003  No results found for: 20015  No results found for: 20016    IMAGING    Recent Results (from the past 48 hour(s))   XR Feeding Tube Placement    Narrative    Feeding tube placement: 10/18/2021 11:48 AM    Comparison: None    Indication: Postpyloric feeding tube placement    Fluoroscopy time: 2.8 minutes    Technique: After injection of Xylocaine gel into the left nostril, a  feeding tube was advanced under fluoroscopic guidance.    Findings: The feeding tube was advanced with the tip in the third  portion of the duodenum. A small amount of barium was injected to  demonstrate placement within the small bowel. The feeding tube was  then flushed with normal saline. The feeding tube was secured using a  nasal bridle.      Impression    Impression: Uncomplicated feeding tube placement with tip in the third  portion of the duodenum.    I, JOSEE ROMERO MD, attest that I was present for all critical  portions of the procedure and was immediately available to provide  guidance and assistance during the remainder of the procedure.    I have personally reviewed the examination and initial interpretation  and I agree with the findings.    JOSEE ROMERO MD         SYSTEM ID:  [id number]   XR Chest Port  1 View    Narrative    EXAM: XR CHEST PORT 1 VIEW  10/18/2021 3:03 PM     HISTORY:  coarse breathsounds and breathing pattern change       COMPARISON:  Same day chest radiograph at 0045 hours.    FINDINGS:   Portable AP supine view of the chest. The endotracheal tube tip is  approximately 2.8 cm above the shiv. Stable and intact sternotomy  wires, surgical staples and surgical clips. Stable bilateral chest  tubes. Defibrillator pads and electrodes. Right IJ [place] catheter  projects over the main pulmonary artery, unchanged. Partially  visualized enteric tubes courses below the diaphragm. Trachea is  midline. Cardiomediastinal silhouette and pulmonary vasculature are  within normal limits. Increased bilateral mixed interstitial and  airspace opacities. Possible bilateral small pleural effusion. No  appreciable pneumothorax. Unchanged extensive bilateral neck shoulder  and chest wall subcutaneous emphysema.    No acute osseous abnormality. Visualized upper abdomen is  unremarkable.        Impression    IMPRESSION:    1. Increased perihilar and basal predominant bilateral mixed pulmonary  opacities which may represent worsening pulmonary edema and/or  infection   2. Support devices as described above.  3. Persistent subcutaneous emphysema.    I have personally reviewed the examination and initial interpretation  and I agree with the findings.    SHANDRA CEVALLOS MD         SYSTEM ID:  [id number]   XR Chest Port 1 View    Narrative    EXAMINATION: XR CHEST PORT 1 VIEW, 10/19/2021 1:19 AM    INDICATION: s/p lung transplant    COMPARISON: 10/18/2021    FINDINGS: Single portable supine AP radiograph of the chest. ET tube  projects 2.5 cm above the shiv. Right IJ [place] catheter projects  over the main pulmonary artery. Intact clam shell sternotomy wires.  Surgical clips over the low thorax. Bibasilar chest tubes. Apically  directed chest tubes bilaterally. Mediastinal drain. Enteric tubes  course below the left  hemidiaphragm, tips are not within view.  External pacing pad.    Trachea is midline. The cardiomediastinal silhouette is indistinct.  Bilateral pleural effusions. Slightly decreased basilar predominant  interstitial and airspace opacities. No appreciable pneumothorax.    No acute osseous abnormality. Unchanged subcutaneous emphysema across  the chest wall and right neck.         Impression    IMPRESSION:  1. Stable support devices.  2. Slightly decreased basilar predominant interstitial and airspace  opacities favored to represent pulmonary edema. Small bilateral  pleural effusions.  3. Stable subcutaneous emphysema.    I have personally reviewed the examination and initial interpretation  and I agree with the findings.    ROSS OLMSTEAD MD         SYSTEM ID:  [id number]    Upper Extremity Venous Duplex Right Portable    Narrative    EXAMINATION: DOPPLER VENOUS ULTRASOUND OF THE RIGHT UPPER EXTREMITY,  10/19/2021 9:53 AM     COMPARISON: None.    HISTORY: Right upper extremity swelling    TECHNIQUE:  Gray-scale evaluation with compression, spectral flow and  color Doppler assessment of the deep venous system of the right upper  extremity.    FINDINGS:  Normal blood flow and waveforms are demonstrated in the internal  jugular vein and there is no evidence of echogenic thrombus within the  lumen. There is normal compressibility of the brachial, basilic and  cephalic veins.  The right innominate, subclavian, and axillary veins are obscured due  to subcutaneous emphysema.    Right internal jugular central catheter in place without evidence of  echogenic thrombus in the lumen.  Right basilic venous approach PICC line starting in the antecubital  fossa.      Impression    IMPRESSION:  1.  No sonographic evidence of left upper extremity deep venous  thrombosis.    I have personally reviewed the examination and initial interpretation  and I agree with the findings.    JODI MENDEZ MD         SYSTEM ID:  [id number]    XR Chest Port 1 View    Narrative    Chest one view portable    HISTORY: Chest tube output increased    COMPARISON STUDY: Same day at 00 52    FINDINGS: Changes of bilateral lung transplant. Endotracheal tube,  bilateral chest tubes, clamshell sternotomy, feeding tube, NG tube,  right IJ [place] catheter unchanged. Right PICC in the SVC with tip  not well visualized from overlying catheters. Mediastinal drain in  place. Cardiac silhouette is enlarged. Interstitial opacities  bilaterally unchanged. Subcutaneous emphysema bilaterally unchanged.  Bibasilar atelectasis.      Impression    IMPRESSION: Interstitial edema and bibasilar atelectasis.    DAVIN ANGUIANO MD         SYSTEM ID:  [id number]

## 2021-10-21 ENCOUNTER — APPOINTMENT (OUTPATIENT)
Dept: GENERAL RADIOLOGY | Facility: CLINIC | Age: 56
End: 2021-10-21
Attending: STUDENT IN AN ORGANIZED HEALTH CARE EDUCATION/TRAINING PROGRAM
Payer: COMMERCIAL

## 2021-10-21 LAB
ALBUMIN SERPL-MCNC: 1.2 G/DL (ref 3.4–5)
ALBUMIN SERPL-MCNC: 1.3 G/DL (ref 3.4–5)
ALBUMIN SERPL-MCNC: 1.4 G/DL (ref 3.4–5)
ALBUMIN SERPL-MCNC: 1.4 G/DL (ref 3.4–5)
ALP SERPL-CCNC: 172 U/L (ref 40–150)
ALP SERPL-CCNC: 195 U/L (ref 40–150)
ALP SERPL-CCNC: 195 U/L (ref 40–150)
ALP SERPL-CCNC: 199 U/L (ref 40–150)
ALT SERPL W P-5'-P-CCNC: 391 U/L (ref 0–70)
ALT SERPL W P-5'-P-CCNC: 486 U/L (ref 0–70)
ALT SERPL W P-5'-P-CCNC: 551 U/L (ref 0–70)
ALT SERPL W P-5'-P-CCNC: 558 U/L (ref 0–70)
ANION GAP SERPL CALCULATED.3IONS-SCNC: 5 MMOL/L (ref 3–14)
ANION GAP SERPL CALCULATED.3IONS-SCNC: 5 MMOL/L (ref 3–14)
ANION GAP SERPL CALCULATED.3IONS-SCNC: 6 MMOL/L (ref 3–14)
ANION GAP SERPL CALCULATED.3IONS-SCNC: 7 MMOL/L (ref 3–14)
AST SERPL W P-5'-P-CCNC: 44 U/L (ref 0–45)
AST SERPL W P-5'-P-CCNC: 57 U/L (ref 0–45)
AST SERPL W P-5'-P-CCNC: 58 U/L (ref 0–45)
AST SERPL W P-5'-P-CCNC: 66 U/L (ref 0–45)
ATRIAL RATE - MUSE: 78 BPM
BACTERIA UR CULT: NO GROWTH
BASE EXCESS BLDA CALC-SCNC: 10.5 MMOL/L (ref -9–1.8)
BASE EXCESS BLDA CALC-SCNC: 10.9 MMOL/L (ref -9–1.8)
BASE EXCESS BLDA CALC-SCNC: 6.6 MMOL/L (ref -9–1.8)
BASE EXCESS BLDA CALC-SCNC: 8.7 MMOL/L (ref -9–1.8)
BASE EXCESS BLDA CALC-SCNC: 9.2 MMOL/L (ref -9–1.8)
BASE EXCESS BLDA CALC-SCNC: 9.7 MMOL/L (ref -9–1.8)
BASE EXCESS BLDV CALC-SCNC: 10 MMOL/L (ref -7.7–1.9)
BASE EXCESS BLDV CALC-SCNC: 13 MMOL/L (ref -7.7–1.9)
BASE EXCESS BLDV CALC-SCNC: 13.8 MMOL/L (ref -7.7–1.9)
BASE EXCESS BLDV CALC-SCNC: 7.6 MMOL/L (ref -7.7–1.9)
BASE EXCESS BLDV CALC-SCNC: 9.5 MMOL/L (ref -7.7–1.9)
BASE EXCESS BLDV CALC-SCNC: 9.7 MMOL/L (ref -7.7–1.9)
BASOPHILS # BLD AUTO: 0.1 10E3/UL (ref 0–0.2)
BASOPHILS NFR BLD AUTO: 0 %
BILIRUB DIRECT SERPL-MCNC: 0.2 MG/DL (ref 0–0.2)
BILIRUB SERPL-MCNC: 0.3 MG/DL (ref 0.2–1.3)
BILIRUB SERPL-MCNC: 0.4 MG/DL (ref 0.2–1.3)
BUN SERPL-MCNC: 55 MG/DL (ref 7–30)
BUN SERPL-MCNC: 58 MG/DL (ref 7–30)
BUN SERPL-MCNC: 59 MG/DL (ref 7–30)
BUN SERPL-MCNC: 61 MG/DL (ref 7–30)
CALCIUM SERPL-MCNC: 7.9 MG/DL (ref 8.5–10.1)
CALCIUM SERPL-MCNC: 8.2 MG/DL (ref 8.5–10.1)
CALCIUM SERPL-MCNC: 8.2 MG/DL (ref 8.5–10.1)
CALCIUM SERPL-MCNC: 8.3 MG/DL (ref 8.5–10.1)
CHLORIDE BLD-SCNC: 104 MMOL/L (ref 94–109)
CHLORIDE BLD-SCNC: 104 MMOL/L (ref 94–109)
CHLORIDE BLD-SCNC: 105 MMOL/L (ref 94–109)
CHLORIDE BLD-SCNC: 105 MMOL/L (ref 94–109)
CO2 SERPL-SCNC: 34 MMOL/L (ref 20–32)
CO2 SERPL-SCNC: 34 MMOL/L (ref 20–32)
CO2 SERPL-SCNC: 36 MMOL/L (ref 20–32)
CO2 SERPL-SCNC: 36 MMOL/L (ref 20–32)
CREAT SERPL-MCNC: 1.43 MG/DL (ref 0.66–1.25)
CREAT SERPL-MCNC: 1.48 MG/DL (ref 0.66–1.25)
CREAT SERPL-MCNC: 1.52 MG/DL (ref 0.66–1.25)
CREAT SERPL-MCNC: 1.54 MG/DL (ref 0.66–1.25)
DIASTOLIC BLOOD PRESSURE - MUSE: NORMAL MMHG
EOSINOPHIL # BLD AUTO: 0 10E3/UL (ref 0–0.7)
EOSINOPHIL NFR BLD AUTO: 0 %
ERYTHROCYTE [DISTWIDTH] IN BLOOD BY AUTOMATED COUNT: 17 % (ref 10–15)
GFR SERPL CREATININE-BSD FRML MDRD: 50 ML/MIN/1.73M2
GFR SERPL CREATININE-BSD FRML MDRD: 50 ML/MIN/1.73M2
GFR SERPL CREATININE-BSD FRML MDRD: 52 ML/MIN/1.73M2
GFR SERPL CREATININE-BSD FRML MDRD: 54 ML/MIN/1.73M2
GLUCOSE BLD-MCNC: 126 MG/DL (ref 70–99)
GLUCOSE BLD-MCNC: 149 MG/DL (ref 70–99)
GLUCOSE BLD-MCNC: 149 MG/DL (ref 70–99)
GLUCOSE BLD-MCNC: 182 MG/DL (ref 70–99)
GLUCOSE BLDC GLUCOMTR-MCNC: 120 MG/DL (ref 70–99)
GLUCOSE BLDC GLUCOMTR-MCNC: 121 MG/DL (ref 70–99)
GLUCOSE BLDC GLUCOMTR-MCNC: 125 MG/DL (ref 70–99)
GLUCOSE BLDC GLUCOMTR-MCNC: 125 MG/DL (ref 70–99)
GLUCOSE BLDC GLUCOMTR-MCNC: 131 MG/DL (ref 70–99)
GLUCOSE BLDC GLUCOMTR-MCNC: 132 MG/DL (ref 70–99)
GLUCOSE BLDC GLUCOMTR-MCNC: 136 MG/DL (ref 70–99)
GLUCOSE BLDC GLUCOMTR-MCNC: 142 MG/DL (ref 70–99)
GLUCOSE BLDC GLUCOMTR-MCNC: 150 MG/DL (ref 70–99)
GLUCOSE BLDC GLUCOMTR-MCNC: 151 MG/DL (ref 70–99)
GLUCOSE BLDC GLUCOMTR-MCNC: 155 MG/DL (ref 70–99)
GLUCOSE BLDC GLUCOMTR-MCNC: 159 MG/DL (ref 70–99)
GLUCOSE BLDC GLUCOMTR-MCNC: 161 MG/DL (ref 70–99)
GLUCOSE BLDC GLUCOMTR-MCNC: 164 MG/DL (ref 70–99)
GLUCOSE BLDC GLUCOMTR-MCNC: 167 MG/DL (ref 70–99)
GLUCOSE BLDC GLUCOMTR-MCNC: 170 MG/DL (ref 70–99)
GLUCOSE BLDC GLUCOMTR-MCNC: 172 MG/DL (ref 70–99)
GLUCOSE BLDC GLUCOMTR-MCNC: 178 MG/DL (ref 70–99)
GLUCOSE BLDC GLUCOMTR-MCNC: 180 MG/DL (ref 70–99)
GLUCOSE BLDC GLUCOMTR-MCNC: 192 MG/DL (ref 70–99)
GLUCOSE BLDC GLUCOMTR-MCNC: 195 MG/DL (ref 70–99)
HCO3 BLD-SCNC: 32 MMOL/L (ref 21–28)
HCO3 BLD-SCNC: 33 MMOL/L (ref 21–28)
HCO3 BLD-SCNC: 35 MMOL/L (ref 21–28)
HCO3 BLD-SCNC: 36 MMOL/L (ref 21–28)
HCO3 BLDV-SCNC: 35 MMOL/L (ref 21–28)
HCO3 BLDV-SCNC: 35 MMOL/L (ref 21–28)
HCO3 BLDV-SCNC: 36 MMOL/L (ref 21–28)
HCO3 BLDV-SCNC: 36 MMOL/L (ref 21–28)
HCO3 BLDV-SCNC: 39 MMOL/L (ref 21–28)
HCO3 BLDV-SCNC: 39 MMOL/L (ref 21–28)
HCT VFR BLD AUTO: 26.7 % (ref 40–53)
HGB BLD-MCNC: 8.3 G/DL (ref 13.3–17.7)
IMM GRANULOCYTES # BLD: 0.9 10E3/UL
IMM GRANULOCYTES NFR BLD: 3 %
INR PPP: 1.38 (ref 0.85–1.15)
INR PPP: 1.43 (ref 0.85–1.15)
INTERPRETATION ECG - MUSE: NORMAL
LACTATE SERPL-SCNC: 0.9 MMOL/L (ref 0.7–2)
LACTATE SERPL-SCNC: 1 MMOL/L (ref 0.7–2)
LACTATE SERPL-SCNC: 1.2 MMOL/L (ref 0.7–2)
LACTATE SERPL-SCNC: 1.2 MMOL/L (ref 0.7–2)
LACTATE SERPL-SCNC: 1.3 MMOL/L (ref 0.7–2)
LACTATE SERPL-SCNC: 1.9 MMOL/L (ref 0.7–2)
LYMPHOCYTES # BLD AUTO: 1 10E3/UL (ref 0.8–5.3)
LYMPHOCYTES NFR BLD AUTO: 4 %
MAGNESIUM SERPL-MCNC: 2.3 MG/DL (ref 1.6–2.3)
MAGNESIUM SERPL-MCNC: 2.3 MG/DL (ref 1.6–2.3)
MAGNESIUM SERPL-MCNC: 2.4 MG/DL (ref 1.6–2.3)
MCH RBC QN AUTO: 30 PG (ref 26.5–33)
MCHC RBC AUTO-ENTMCNC: 31.1 G/DL (ref 31.5–36.5)
MCV RBC AUTO: 96 FL (ref 78–100)
MONOCYTES # BLD AUTO: 1.3 10E3/UL (ref 0–1.3)
MONOCYTES NFR BLD AUTO: 5 %
NEUTROPHILS # BLD AUTO: 23.3 10E3/UL (ref 1.6–8.3)
NEUTROPHILS NFR BLD AUTO: 88 %
NRBC # BLD AUTO: 0.1 10E3/UL
NRBC BLD AUTO-RTO: 1 /100
O2/TOTAL GAS SETTING VFR VENT: 40 %
O2/TOTAL GAS SETTING VFR VENT: 50 %
O2/TOTAL GAS SETTING VFR VENT: 50 %
O2/TOTAL GAS SETTING VFR VENT: 60 %
OXYHGB MFR BLDV: 50 % (ref 70–75)
OXYHGB MFR BLDV: 50 % (ref 70–75)
OXYHGB MFR BLDV: 56 % (ref 70–75)
OXYHGB MFR BLDV: 57 % (ref 70–75)
OXYHGB MFR BLDV: 58 % (ref 70–75)
OXYHGB MFR BLDV: 59 % (ref 70–75)
P AXIS - MUSE: 25 DEGREES
PATH REPORT.COMMENTS IMP SPEC: ABNORMAL
PATH REPORT.COMMENTS IMP SPEC: YES
PATH REPORT.FINAL DX SPEC: ABNORMAL
PATH REPORT.GROSS SPEC: ABNORMAL
PATH REPORT.MICROSCOPIC SPEC OTHER STN: ABNORMAL
PATH REPORT.RELEVANT HX SPEC: ABNORMAL
PCO2 BLD: 42 MM HG (ref 35–45)
PCO2 BLD: 47 MM HG (ref 35–45)
PCO2 BLD: 48 MM HG (ref 35–45)
PCO2 BLD: 51 MM HG (ref 35–45)
PCO2 BLD: 60 MM HG (ref 35–45)
PCO2 BLD: 63 MM HG (ref 35–45)
PCO2 BLDV: 51 MM HG (ref 40–50)
PCO2 BLDV: 55 MM HG (ref 40–50)
PCO2 BLDV: 57 MM HG (ref 40–50)
PCO2 BLDV: 58 MM HG (ref 40–50)
PCO2 BLDV: 65 MM HG (ref 40–50)
PCO2 BLDV: 67 MM HG (ref 40–50)
PH BLD: 7.36 [PH] (ref 7.35–7.45)
PH BLD: 7.39 [PH] (ref 7.35–7.45)
PH BLD: 7.44 [PH] (ref 7.35–7.45)
PH BLD: 7.46 [PH] (ref 7.35–7.45)
PH BLD: 7.48 [PH] (ref 7.35–7.45)
PH BLD: 7.5 [PH] (ref 7.35–7.45)
PH BLDV: 7.33 [PH] (ref 7.32–7.43)
PH BLDV: 7.36 [PH] (ref 7.32–7.43)
PH BLDV: 7.4 [PH] (ref 7.32–7.43)
PH BLDV: 7.44 [PH] (ref 7.32–7.43)
PH BLDV: 7.44 [PH] (ref 7.32–7.43)
PH BLDV: 7.47 [PH] (ref 7.32–7.43)
PHOSPHATE SERPL-MCNC: 4.9 MG/DL (ref 2.5–4.5)
PHOSPHATE SERPL-MCNC: 5 MG/DL (ref 2.5–4.5)
PHOSPHATE SERPL-MCNC: 7.5 MG/DL (ref 2.5–4.5)
PLATELET # BLD AUTO: 114 10E3/UL (ref 150–450)
PO2 BLD: 101 MM HG (ref 80–105)
PO2 BLD: 102 MM HG (ref 80–105)
PO2 BLD: 127 MM HG (ref 80–105)
PO2 BLD: 159 MM HG (ref 80–105)
PO2 BLD: 66 MM HG (ref 80–105)
PO2 BLD: 79 MM HG (ref 80–105)
PO2 BLDV: 31 MM HG (ref 25–47)
PO2 BLDV: 31 MM HG (ref 25–47)
PO2 BLDV: 32 MM HG (ref 25–47)
PO2 BLDV: 34 MM HG (ref 25–47)
PO2 BLDV: 35 MM HG (ref 25–47)
PO2 BLDV: 35 MM HG (ref 25–47)
POTASSIUM BLD-SCNC: 3.8 MMOL/L (ref 3.4–5.3)
POTASSIUM BLD-SCNC: 4.1 MMOL/L (ref 3.4–5.3)
POTASSIUM BLD-SCNC: 4.2 MMOL/L (ref 3.4–5.3)
POTASSIUM BLD-SCNC: 4.9 MMOL/L (ref 3.4–5.3)
POTASSIUM BLD-SCNC: 4.9 MMOL/L (ref 3.4–5.3)
PR INTERVAL - MUSE: 96 MS
PROT SERPL-MCNC: 4.8 G/DL (ref 6.8–8.8)
PROT SERPL-MCNC: 4.8 G/DL (ref 6.8–8.8)
PROT SERPL-MCNC: 4.9 G/DL (ref 6.8–8.8)
PROT SERPL-MCNC: 5.4 G/DL (ref 6.8–8.8)
QRS DURATION - MUSE: 70 MS
QT - MUSE: 410 MS
QTC - MUSE: 467 MS
R AXIS - MUSE: 52 DEGREES
RBC # BLD AUTO: 2.77 10E6/UL (ref 4.4–5.9)
SCANNED LAB RESULT: NORMAL
SODIUM SERPL-SCNC: 144 MMOL/L (ref 133–144)
SODIUM SERPL-SCNC: 145 MMOL/L (ref 133–144)
SODIUM SERPL-SCNC: 146 MMOL/L (ref 133–144)
SODIUM SERPL-SCNC: 146 MMOL/L (ref 133–144)
SYSTOLIC BLOOD PRESSURE - MUSE: NORMAL MMHG
T AXIS - MUSE: 54 DEGREES
TACROLIMUS BLD-MCNC: 6.9 UG/L (ref 5–15)
TME LAST DOSE: NORMAL H
TME LAST DOSE: NORMAL H
TRIGL SERPL-MCNC: 486 MG/DL
VENTRICULAR RATE- MUSE: 78 BPM
WBC # BLD AUTO: 26.5 10E3/UL (ref 4–11)

## 2021-10-21 PROCEDURE — 82805 BLOOD GASES W/O2 SATURATION: CPT | Performed by: INTERNAL MEDICINE

## 2021-10-21 PROCEDURE — 250N000009 HC RX 250: Performed by: ANESTHESIOLOGY

## 2021-10-21 PROCEDURE — 83735 ASSAY OF MAGNESIUM: CPT | Performed by: INTERNAL MEDICINE

## 2021-10-21 PROCEDURE — 250N000013 HC RX MED GY IP 250 OP 250 PS 637: Performed by: THORACIC SURGERY (CARDIOTHORACIC VASCULAR SURGERY)

## 2021-10-21 PROCEDURE — 999N000045 HC STATISTIC DAILY SWAN MONITORING

## 2021-10-21 PROCEDURE — 83605 ASSAY OF LACTIC ACID: CPT | Performed by: ANESTHESIOLOGY

## 2021-10-21 PROCEDURE — 94799 UNLISTED PULMONARY SVC/PX: CPT

## 2021-10-21 PROCEDURE — 250N000013 HC RX MED GY IP 250 OP 250 PS 637: Performed by: PHYSICIAN ASSISTANT

## 2021-10-21 PROCEDURE — 250N000009 HC RX 250: Performed by: NURSE PRACTITIONER

## 2021-10-21 PROCEDURE — 99233 SBSQ HOSP IP/OBS HIGH 50: CPT | Performed by: INTERNAL MEDICINE

## 2021-10-21 PROCEDURE — 250N000011 HC RX IP 250 OP 636: Performed by: STUDENT IN AN ORGANIZED HEALTH CARE EDUCATION/TRAINING PROGRAM

## 2021-10-21 PROCEDURE — 82248 BILIRUBIN DIRECT: CPT | Performed by: INTERNAL MEDICINE

## 2021-10-21 PROCEDURE — 94640 AIRWAY INHALATION TREATMENT: CPT | Mod: 76

## 2021-10-21 PROCEDURE — 84450 TRANSFERASE (AST) (SGOT): CPT | Performed by: INTERNAL MEDICINE

## 2021-10-21 PROCEDURE — 250N000011 HC RX IP 250 OP 636: Performed by: PHYSICIAN ASSISTANT

## 2021-10-21 PROCEDURE — 250N000011 HC RX IP 250 OP 636: Performed by: NURSE PRACTITIONER

## 2021-10-21 PROCEDURE — 93010 ELECTROCARDIOGRAM REPORT: CPT | Performed by: INTERNAL MEDICINE

## 2021-10-21 PROCEDURE — 80197 ASSAY OF TACROLIMUS: CPT | Performed by: NURSE PRACTITIONER

## 2021-10-21 PROCEDURE — 71045 X-RAY EXAM CHEST 1 VIEW: CPT

## 2021-10-21 PROCEDURE — 250N000013 HC RX MED GY IP 250 OP 250 PS 637: Performed by: STUDENT IN AN ORGANIZED HEALTH CARE EDUCATION/TRAINING PROGRAM

## 2021-10-21 PROCEDURE — 71045 X-RAY EXAM CHEST 1 VIEW: CPT | Mod: 26 | Performed by: RADIOLOGY

## 2021-10-21 PROCEDURE — 84155 ASSAY OF PROTEIN SERUM: CPT | Performed by: INTERNAL MEDICINE

## 2021-10-21 PROCEDURE — 250N000013 HC RX MED GY IP 250 OP 250 PS 637: Performed by: ANESTHESIOLOGY

## 2021-10-21 PROCEDURE — 250N000013 HC RX MED GY IP 250 OP 250 PS 637: Performed by: NURSE PRACTITIONER

## 2021-10-21 PROCEDURE — C9113 INJ PANTOPRAZOLE SODIUM, VIA: HCPCS | Performed by: NURSE PRACTITIONER

## 2021-10-21 PROCEDURE — 82803 BLOOD GASES ANY COMBINATION: CPT | Performed by: INTERNAL MEDICINE

## 2021-10-21 PROCEDURE — 84132 ASSAY OF SERUM POTASSIUM: CPT

## 2021-10-21 PROCEDURE — 258N000003 HC RX IP 258 OP 636: Performed by: THORACIC SURGERY (CARDIOTHORACIC VASCULAR SURGERY)

## 2021-10-21 PROCEDURE — 999N000157 HC STATISTIC RCP TIME EA 10 MIN

## 2021-10-21 PROCEDURE — 84100 ASSAY OF PHOSPHORUS: CPT | Performed by: INTERNAL MEDICINE

## 2021-10-21 PROCEDURE — 250N000012 HC RX MED GY IP 250 OP 636 PS 637: Performed by: STUDENT IN AN ORGANIZED HEALTH CARE EDUCATION/TRAINING PROGRAM

## 2021-10-21 PROCEDURE — 80048 BASIC METABOLIC PNL TOTAL CA: CPT | Performed by: INTERNAL MEDICINE

## 2021-10-21 PROCEDURE — 85610 PROTHROMBIN TIME: CPT | Performed by: INTERNAL MEDICINE

## 2021-10-21 PROCEDURE — 80069 RENAL FUNCTION PANEL: CPT | Performed by: INTERNAL MEDICINE

## 2021-10-21 PROCEDURE — 200N000002 HC R&B ICU UMMC

## 2021-10-21 PROCEDURE — 250N000012 HC RX MED GY IP 250 OP 636 PS 637: Performed by: NURSE PRACTITIONER

## 2021-10-21 PROCEDURE — 999N000185 HC STATISTIC TRANSPORT TIME EA 15 MIN

## 2021-10-21 PROCEDURE — 999N000155 HC STATISTIC RAPCV CVP MONITORING

## 2021-10-21 PROCEDURE — 94640 AIRWAY INHALATION TREATMENT: CPT

## 2021-10-21 PROCEDURE — 83735 ASSAY OF MAGNESIUM: CPT

## 2021-10-21 PROCEDURE — 93005 ELECTROCARDIOGRAM TRACING: CPT

## 2021-10-21 PROCEDURE — 84100 ASSAY OF PHOSPHORUS: CPT

## 2021-10-21 PROCEDURE — 250N000011 HC RX IP 250 OP 636: Performed by: ANESTHESIOLOGY

## 2021-10-21 PROCEDURE — 250N000011 HC RX IP 250 OP 636: Performed by: THORACIC SURGERY (CARDIOTHORACIC VASCULAR SURGERY)

## 2021-10-21 PROCEDURE — 250N000009 HC RX 250: Performed by: STUDENT IN AN ORGANIZED HEALTH CARE EDUCATION/TRAINING PROGRAM

## 2021-10-21 PROCEDURE — 94668 MNPJ CHEST WALL SBSQ: CPT

## 2021-10-21 PROCEDURE — 250N000012 HC RX MED GY IP 250 OP 636 PS 637: Performed by: PHYSICIAN ASSISTANT

## 2021-10-21 PROCEDURE — 258N000003 HC RX IP 258 OP 636: Performed by: ANESTHESIOLOGY

## 2021-10-21 PROCEDURE — 85025 COMPLETE CBC W/AUTO DIFF WBC: CPT | Performed by: STUDENT IN AN ORGANIZED HEALTH CARE EDUCATION/TRAINING PROGRAM

## 2021-10-21 PROCEDURE — 99207 PR NON-BILLABLE SERV PER CHARTING: CPT | Mod: 24 | Performed by: PHYSICIAN ASSISTANT

## 2021-10-21 PROCEDURE — 999N000015 HC STATISTIC ARTERIAL MONITORING DAILY

## 2021-10-21 PROCEDURE — 94003 VENT MGMT INPAT SUBQ DAY: CPT

## 2021-10-21 PROCEDURE — 84478 ASSAY OF TRIGLYCERIDES: CPT | Performed by: THORACIC SURGERY (CARDIOTHORACIC VASCULAR SURGERY)

## 2021-10-21 PROCEDURE — 99291 CRITICAL CARE FIRST HOUR: CPT | Mod: 24 | Performed by: ANESTHESIOLOGY

## 2021-10-21 RX ORDER — DIPHENHYDRAMINE HCL 25 MG
50 CAPSULE ORAL
Status: DISCONTINUED | OUTPATIENT
Start: 2021-10-21 | End: 2021-10-22

## 2021-10-21 RX ORDER — NOREPINEPHRINE BITARTRATE 0.06 MG/ML
.01-.6 INJECTION, SOLUTION INTRAVENOUS CONTINUOUS
Status: DISCONTINUED | OUTPATIENT
Start: 2021-10-21 | End: 2021-10-22

## 2021-10-21 RX ORDER — AMIODARONE HYDROCHLORIDE 200 MG/1
400 TABLET ORAL 2 TIMES DAILY
Status: DISCONTINUED | OUTPATIENT
Start: 2021-10-21 | End: 2021-10-28

## 2021-10-21 RX ORDER — DIPHENHYDRAMINE HYDROCHLORIDE 50 MG/ML
50 INJECTION INTRAMUSCULAR; INTRAVENOUS
Status: DISCONTINUED | OUTPATIENT
Start: 2021-10-21 | End: 2021-10-22

## 2021-10-21 RX ORDER — DIPHENHYDRAMINE HYDROCHLORIDE 50 MG/ML
50 INJECTION INTRAMUSCULAR; INTRAVENOUS ONCE
Status: COMPLETED | OUTPATIENT
Start: 2021-10-21 | End: 2021-10-21

## 2021-10-21 RX ORDER — DIPHENHYDRAMINE HCL 25 MG
50 CAPSULE ORAL ONCE
Status: COMPLETED | OUTPATIENT
Start: 2021-10-21 | End: 2021-10-21

## 2021-10-21 RX ORDER — PROPOFOL 10 MG/ML
5-75 INJECTION, EMULSION INTRAVENOUS CONTINUOUS
Status: DISCONTINUED | OUTPATIENT
Start: 2021-10-21 | End: 2021-10-22

## 2021-10-21 RX ORDER — METHYLPREDNISOLONE SODIUM SUCCINATE 125 MG/2ML
125 INJECTION, POWDER, LYOPHILIZED, FOR SOLUTION INTRAMUSCULAR; INTRAVENOUS
Status: DISCONTINUED | OUTPATIENT
Start: 2021-10-21 | End: 2021-10-22

## 2021-10-21 RX ORDER — ACETAMINOPHEN 325 MG/1
650 TABLET ORAL
Status: DISCONTINUED | OUTPATIENT
Start: 2021-10-21 | End: 2021-10-22

## 2021-10-21 RX ORDER — ACETAMINOPHEN 325 MG/1
650 TABLET ORAL ONCE
Status: COMPLETED | OUTPATIENT
Start: 2021-10-21 | End: 2021-10-21

## 2021-10-21 RX ORDER — ACETAZOLAMIDE 250 MG/1
500 TABLET ORAL EVERY 8 HOURS
Status: DISCONTINUED | OUTPATIENT
Start: 2021-10-21 | End: 2021-10-21

## 2021-10-21 RX ORDER — CEFTAZIDIME 2 G/1
2 INJECTION, POWDER, FOR SOLUTION INTRAVENOUS EVERY 8 HOURS
Status: DISCONTINUED | OUTPATIENT
Start: 2021-10-21 | End: 2021-10-22

## 2021-10-21 RX ORDER — ACETAZOLAMIDE 250 MG/1
500 TABLET ORAL 3 TIMES DAILY
Status: COMPLETED | OUTPATIENT
Start: 2021-10-21 | End: 2021-10-21

## 2021-10-21 RX ADMIN — CEFTAZIDIME 2 G: 2 INJECTION, POWDER, FOR SOLUTION INTRAVENOUS at 10:46

## 2021-10-21 RX ADMIN — ACYCLOVIR 400 MG: 200 SUSPENSION ORAL at 20:22

## 2021-10-21 RX ADMIN — HEPARIN SODIUM 5000 UNITS: 10000 INJECTION, SOLUTION INTRAVENOUS; SUBCUTANEOUS at 12:04

## 2021-10-21 RX ADMIN — PREDNISOLONE 17.5 MG: 15 SOLUTION ORAL at 07:45

## 2021-10-21 RX ADMIN — HUMAN INSULIN 8 UNITS/HR: 100 INJECTION, SOLUTION SUBCUTANEOUS at 20:28

## 2021-10-21 RX ADMIN — LEVALBUTEROL HYDROCHLORIDE 1.25 MG: 1.25 SOLUTION RESPIRATORY (INHALATION) at 20:43

## 2021-10-21 RX ADMIN — NYSTATIN 1000000 UNITS: 500000 SUSPENSION ORAL at 07:45

## 2021-10-21 RX ADMIN — NYSTATIN 1000000 UNITS: 500000 SUSPENSION ORAL at 20:36

## 2021-10-21 RX ADMIN — LEVALBUTEROL HYDROCHLORIDE 1.25 MG: 1.25 SOLUTION RESPIRATORY (INHALATION) at 16:53

## 2021-10-21 RX ADMIN — POTASSIUM CHLORIDE 20 MEQ: 40 SOLUTION ORAL at 07:47

## 2021-10-21 RX ADMIN — TACROLIMUS 5 MG: 5 CAPSULE ORAL at 19:01

## 2021-10-21 RX ADMIN — ACETYLCYSTEINE 2 ML: 200 SOLUTION ORAL; RESPIRATORY (INHALATION) at 16:53

## 2021-10-21 RX ADMIN — GABAPENTIN 100 MG: 250 SUSPENSION ORAL at 14:30

## 2021-10-21 RX ADMIN — HEPARIN SODIUM 5000 UNITS: 10000 INJECTION, SOLUTION INTRAVENOUS; SUBCUTANEOUS at 20:29

## 2021-10-21 RX ADMIN — DIPHENHYDRAMINE HYDROCHLORIDE 50 MG: 50 INJECTION INTRAMUSCULAR; INTRAVENOUS at 12:04

## 2021-10-21 RX ADMIN — PROPOFOL 15 MCG/KG/MIN: 10 INJECTION, EMULSION INTRAVENOUS at 19:22

## 2021-10-21 RX ADMIN — ACETAZOLAMIDE 500 MG: 250 TABLET ORAL at 14:30

## 2021-10-21 RX ADMIN — AMIODARONE HYDROCHLORIDE 400 MG: 200 TABLET ORAL at 20:15

## 2021-10-21 RX ADMIN — CEFTAZIDIME 2 G: 2 INJECTION, POWDER, FOR SOLUTION INTRAVENOUS at 16:51

## 2021-10-21 RX ADMIN — LEVOTHYROXINE SODIUM 25 MCG: 0.03 TABLET ORAL at 07:52

## 2021-10-21 RX ADMIN — Medication 5 MG: at 20:15

## 2021-10-21 RX ADMIN — QUETIAPINE FUMARATE 25 MG: 25 TABLET ORAL at 07:52

## 2021-10-21 RX ADMIN — HUMAN INSULIN 4 UNITS/HR: 100 INJECTION, SOLUTION SUBCUTANEOUS at 11:00

## 2021-10-21 RX ADMIN — ACETYLCYSTEINE 2 ML: 200 SOLUTION ORAL; RESPIRATORY (INHALATION) at 20:43

## 2021-10-21 RX ADMIN — ESCITALOPRAM 5 MG: 5 TABLET, FILM COATED ORAL at 07:45

## 2021-10-21 RX ADMIN — MYCOPHENOLATE MOFETIL 1500 MG: 200 POWDER, FOR SUSPENSION ORAL at 07:44

## 2021-10-21 RX ADMIN — Medication 150 MCG/HR: at 00:59

## 2021-10-21 RX ADMIN — MULTIVIT AND MINERALS-FERROUS GLUCONATE 9 MG IRON/15 ML ORAL LIQUID 15 ML: at 07:52

## 2021-10-21 RX ADMIN — ACETAZOLAMIDE 500 MG: 250 TABLET ORAL at 10:47

## 2021-10-21 RX ADMIN — HEPARIN SODIUM 5000 UNITS: 10000 INJECTION, SOLUTION INTRAVENOUS; SUBCUTANEOUS at 03:49

## 2021-10-21 RX ADMIN — PROPOFOL 40 MCG/KG/MIN: 10 INJECTION, EMULSION INTRAVENOUS at 03:49

## 2021-10-21 RX ADMIN — DOCUSATE SODIUM 50 MG AND SENNOSIDES 8.6 MG 1 TABLET: 8.6; 5 TABLET, FILM COATED ORAL at 20:16

## 2021-10-21 RX ADMIN — TACROLIMUS 4 MG: 5 CAPSULE ORAL at 07:45

## 2021-10-21 RX ADMIN — PANTOPRAZOLE SODIUM 40 MG: 40 INJECTION, POWDER, FOR SOLUTION INTRAVENOUS at 07:52

## 2021-10-21 RX ADMIN — DEXMEDETOMIDINE 1.2 MCG/KG/HR: 100 INJECTION, SOLUTION, CONCENTRATE INTRAVENOUS at 03:50

## 2021-10-21 RX ADMIN — SODIUM NITROPRUSSIDE 0.25 MCG/KG/MIN: 25 INJECTION, SOLUTION, CONCENTRATE INTRAVENOUS at 14:45

## 2021-10-21 RX ADMIN — HUMAN INSULIN 6 UNITS/HR: 100 INJECTION, SOLUTION SUBCUTANEOUS at 03:49

## 2021-10-21 RX ADMIN — GABAPENTIN 100 MG: 250 SUSPENSION ORAL at 20:16

## 2021-10-21 RX ADMIN — DEXMEDETOMIDINE 0.8 MCG/KG/HR: 100 INJECTION, SOLUTION, CONCENTRATE INTRAVENOUS at 19:23

## 2021-10-21 RX ADMIN — QUETIAPINE FUMARATE 25 MG: 25 TABLET ORAL at 16:51

## 2021-10-21 RX ADMIN — PANTOPRAZOLE SODIUM 40 MG: 40 INJECTION, POWDER, FOR SOLUTION INTRAVENOUS at 20:24

## 2021-10-21 RX ADMIN — DOCUSATE SODIUM 50 MG AND SENNOSIDES 8.6 MG 1 TABLET: 8.6; 5 TABLET, FILM COATED ORAL at 07:52

## 2021-10-21 RX ADMIN — NYSTATIN 1000000 UNITS: 500000 SUSPENSION ORAL at 16:51

## 2021-10-21 RX ADMIN — LEVALBUTEROL HYDROCHLORIDE 1.25 MG: 1.25 SOLUTION RESPIRATORY (INHALATION) at 11:20

## 2021-10-21 RX ADMIN — HUMAN IMMUNOGLOBULIN G 30 G: 20 LIQUID INTRAVENOUS at 12:58

## 2021-10-21 RX ADMIN — ACETAZOLAMIDE 500 MG: 250 TABLET ORAL at 20:15

## 2021-10-21 RX ADMIN — Medication 1 PACKET: at 20:27

## 2021-10-21 RX ADMIN — HUMAN INSULIN 8 UNITS/HR: 100 INJECTION, SOLUTION SUBCUTANEOUS at 19:01

## 2021-10-21 RX ADMIN — PREDNISOLONE 17.5 MG: 15 SOLUTION ORAL at 20:22

## 2021-10-21 RX ADMIN — GABAPENTIN 100 MG: 250 SUSPENSION ORAL at 07:44

## 2021-10-21 RX ADMIN — HYDROCORTISONE SODIUM SUCCINATE 100 MG: 100 INJECTION, POWDER, FOR SOLUTION INTRAMUSCULAR; INTRAVENOUS at 12:03

## 2021-10-21 RX ADMIN — Medication 1 PACKET: at 07:46

## 2021-10-21 RX ADMIN — ACETYLCYSTEINE 2 ML: 200 SOLUTION ORAL; RESPIRATORY (INHALATION) at 11:21

## 2021-10-21 RX ADMIN — ACYCLOVIR 400 MG: 200 SUSPENSION ORAL at 07:44

## 2021-10-21 RX ADMIN — EPINEPHRINE 0.06 MCG/KG/MIN: 1 INJECTION PARENTERAL at 09:55

## 2021-10-21 RX ADMIN — LEVALBUTEROL HYDROCHLORIDE 1.25 MG: 1.25 SOLUTION RESPIRATORY (INHALATION) at 08:48

## 2021-10-21 RX ADMIN — AMIODARONE HYDROCHLORIDE 400 MG: 200 TABLET ORAL at 09:15

## 2021-10-21 RX ADMIN — SODIUM NITROPRUSSIDE 1.75 MCG/KG/MIN: 25 INJECTION, SOLUTION, CONCENTRATE INTRAVENOUS at 16:52

## 2021-10-21 RX ADMIN — SODIUM NITROPRUSSIDE 2.75 MCG/KG/MIN: 25 INJECTION, SOLUTION, CONCENTRATE INTRAVENOUS at 21:45

## 2021-10-21 RX ADMIN — NYSTATIN 1000000 UNITS: 500000 SUSPENSION ORAL at 13:16

## 2021-10-21 RX ADMIN — ACETYLCYSTEINE 2 ML: 200 SOLUTION ORAL; RESPIRATORY (INHALATION) at 08:48

## 2021-10-21 RX ADMIN — ACETAMINOPHEN 650 MG: 325 TABLET, FILM COATED ORAL at 12:04

## 2021-10-21 RX ADMIN — MYCOPHENOLATE MOFETIL 1500 MG: 200 POWDER, FOR SUSPENSION ORAL at 20:15

## 2021-10-21 RX ADMIN — QUETIAPINE FUMARATE 25 MG: 25 TABLET ORAL at 00:10

## 2021-10-21 ASSESSMENT — ACTIVITIES OF DAILY LIVING (ADL)
ADLS_ACUITY_SCORE: 18
ADLS_ACUITY_SCORE: 20
ADLS_ACUITY_SCORE: 18
ADLS_ACUITY_SCORE: 20
ADLS_ACUITY_SCORE: 18
ADLS_ACUITY_SCORE: 20
ADLS_ACUITY_SCORE: 18
ADLS_ACUITY_SCORE: 20
ADLS_ACUITY_SCORE: 18
ADLS_ACUITY_SCORE: 18
ADLS_ACUITY_SCORE: 20
ADLS_ACUITY_SCORE: 18
ADLS_ACUITY_SCORE: 20
ADLS_ACUITY_SCORE: 18

## 2021-10-21 ASSESSMENT — MIFFLIN-ST. JEOR: SCORE: 1471

## 2021-10-21 NOTE — PLAN OF CARE
Neuro: RASS -3, pt responds to nox stimuli, voice intermittently, weaning off sedation throughout shift.   Prop @ 15, precedex @ 0.8, Fent @ 200    Resp: Nitric at 2, stay at 2 overnight. CMV, , RR 22, PEEP 10, FiO2 60%. One episode of decreased SPO2- 86-89%, MD aware, FiO2 increased to 60%    Cardiac: SR/ST occasionally. Epi off this am, Amio changed to PO. CVP 13, PA 45/7, CI 2.31, SvO2 50%.     GI: OG clamped after PO meds. NJ w/ TF @ 60mL/hr. Insulin gtt @ 6 units/hr. BM x2.    : Bumex gtt @ 1, adequate metzger output.     5 CTs to sxn, minimal output.

## 2021-10-21 NOTE — PROGRESS NOTES
Pulmonary Medicine  Cystic Fibrosis - Lung Transplant Team  Progress Note  10/21/2021       Patient: Edson Thornton  MRN: [id number]  : 1965 (age 56 year old)  Transplant: 10/16/2021 (Lung), POD#5  Admission date: 2021    Assessment & Plan:     Edson Thornton is a 56 year old male with a PMH significant for NSIP/ILD, bronchiectasis, moderate PH, RA, SALTY, chronic HSV infection, hypogammaglobulinemia, steroid-induced diabetes, hypothyroidism, HTN, HLD, duodenal anomaly, anxiety, and depression.  Admitted on 21 from OSH for acute on chronic respiratory failure 2/2 ILD exacerbation.  Pt. is now s/p BSLT on 10/16/21.  Surgery relatively uncomplicated but pt. with low cardiac index and PGD immediately post-op.  The patient remains intubated on Caroline in the CVICU, difficulty with weaning sedation.  Afib with RVR noted 10/18 and also with BRENNAN, improving with aggressive diuresis.     Today's recommendations:   - Repeat inspection/pulmonary toilet bronch in 1-2 days  - Agree with ongoing diuresis as tolerated  - CXR daily as below  - Await explant pathology (should include mediastinal LN)  - Tacrolimus level to trend subtherapeutic, dose increased, continue daily levels (ordered)   - Prednisone taper due 10/24 (ordered)  - Continue to hold Bactrim, revisit 10/25  - CMV and EBV  (not yet ordered)  - Follow pending cultures (10/20), continue ceftazidime, defer vancomycin and antifungal coverage pending BDG fungitell/cultures   - Coccidioides Ag next due  (not yet ordered)  - IVIG with premedications today (ordered)  - DSA ordered 10/24  - Donor risk labs ordered 11/15     S/p BSLT for ILD:  Acute hypoxic/hypercapneic respiratory failure: Unfortunately had not received vaccination for flu, PNA, or COVID-19 PTA (not available after admission d/t immunosuppression state).  Remains on epinephrine for low CI.  Remains intubated on CMV 22/400/10/50 with Caroline at 4, cisatracurium weaned off 10/19,  difficulty weaning sedation due to hypertension and vent dyssynchrony.  POD #1 CXR with new RLL focal opacity and marked increase in diffuse interstitial opacities concerning for pulmonary edema.  Started on Bumex drip and ABX resumed as below 10/19 given intermittent fevers.  Bronch 10/20 with tan secretions to LLL.  - Nebs: levalbuterol and Mucomyst QID  - Aggressive pulmonary toilet with chest physiotherapy QID (addition of Aerobika and incentive spirometry once extubated)  - Repeat inspection/pulmonary toilet bronch in 1-2 days  - Ventilator and Caroline management per ICU team  - Anesthesia to place erector spinae catheters prior to anticipated extubation per our routine, deferred today  - Chest tubes managed by surgical team  - Agree with ongoing diuresis as tolerated  - CXR today with decreased bilateral interstitial and airspace opacities and ongoing small pleural effusions with overlying atelectasis (personally reviewed with Dr. Mckeon)  - Defer chest CT non-contrast for now pending clinical course  - S/p NJ feeding tube placement by radiology 10/18, TF management per RD and ICU team  - Recommend SLP consult as pt. nears extubation for swallowing evaluation  - Await explant pathology (should include mediastinal LN)     Immunosuppression:  - Induction therapy with basiliximab (and high dose IV steroid) given intraoperatively with repeat basiliximab POD#4  - Tacrolimus 4 mg BID (10/20, suspension via OG given concern for inadequate SL administration).  Goal level 8-12.  Level 10/21 subtherapeutic at 6.9, dose increased to 5 mg BID, repeat level daily for now (ordered).  - MMF 1500 mg BID (on PTA, AZA to be avoided given TPMT)  - Prednisolone 17.5 mg BID with next taper on 10/24 (ordered) per lung transplant protocol:  Date AM dose (mg) PM dose (mg)   10/17/21 17.5 17.5   10/24/21 15 15   10/31/21 15 12.5   11/7/21 12.5 12.5   11/21/21 12.5 10   12/5/21 10 10   1/2/22 10 7.5   1/30/22 7.5 7.5   2/27/22 7.5 5    3/27/22 5 5   4/24/22 5 2.5      Prophylaxis:   - Bactrim for PJP ppx deferred post-op pending assessment of renal function, revisit 10/25  - Nystatin for oral candidiasis ppx, 6 month course  - See below for serologies and viral ppx:    Donor Recipient Intervention   CMV status Negative Negative None   EBV status Positive Positive N/A, EBV monthly (11/16, not yet ordered)   HSV status N/A Positive Acyclovir POD #1-30   (recent infection history)      Primary graft dysfunction (per ISHLT guidelines):  See chart below:    POD #0  (~0 hours) POD #1  (~24 hours) POD #2   (~48 hours) POD#3   (~72 hours)   Date 10/16 10/17 10/18 10/19   Time 1536 1537  1629 1559   Intubated Y Y Y Y   PaO2 227 108  116  84   FiO2 100 100  60  50   P/F Ratio 227 108  193  168   PGD Grade   (0=mild, 3=severe) 2 3  3  3   ECMO N N N N   Inhaled NO/Flolan Y Y Y Y   ISHLT PGD Scoring  Grade 0 - PaO2/FiO2 >300 and normal chest radiograph (absence of diffuse allograft infiltrates)  Grade 1 - PaO2/FiO2 >300 and diffuse allograft infiltrates on chest radiograph  Grade 2 - PaO2/FiO2 between 200 and 300  Grade 3 - PaO2/FiO2 <200 and/or on ECMO     ID: Concern for possible Strongyloides exposure pre-transplant s/p ivermectin x1 dose (9/17).  Fevers post-op, Tmax 101.7 POD #1.  S/p IV ceftaz/vancomycin for 48h per protocol.  Febrile again overnight 10/19 to 10/20.  - Donor cultures NGTD (UNOS data personally reviewed this morning)  - Recipient cultures NGTD (including fungal and AFB)   - Blood cultures (10/17, 10/20) NGTD  - Bronch cultures (10/17) with Staph epidermidis and Candida albicans, repeat (10/20) with GPC and yeast (cytology pending)  - Sputum culture (10/17) with normal jean marie, repeat (10/20) with GPC and Candida  - BDG fungitell (10/20) pending  - Monthly Coccidioides Ag x12 months post-transplant per ID (urine and serum negative 10/17), due 11/17 (not yet ordered)  - ABX: ceftazidime (resumed 10/19 given fevers), defer vancomycin and  antifungal coverage pending BDG fungitell/cultures  - Low threshold for transplant ID consult given pre-transplant ID history     HSV: Chronic intermittent active infection pre-transplant with recent HSV infection: crusted lesions throughout left side of jaw, s/p 10 day treatment course of ACV through 10/9.  HSV PCR blood negative 10/17.  - ACV ppx as above (starting on POD #1 instead of POD #8 given HSV history and location)     Hypogammaglobulinemia: IgG previously low at 364 (9/7).  Noted at 265 at time of transplant (10/15).  - IVIG with premedication 10/21 (ordered)     Positive cross match: Note that he received two doses of rituximab in June, which is likely contributing to cross match result.  DSA negative 10/17.  - Repeat DSA weekly x2 then q2 weeks (due 10/24, ordered)     PHS risk criteria donor:  Additional labs required post-transplant (between 4-8 weeks post-op): Hepatitis B, Hepatitis C, and HIV by VISHAL ([id number], ordered POD #30).     SALTY: Noted pre-transplant. Home CPAP 6-12 cmH2)  - Resume BiPAP at night post-extubation     Other relevant problems being managed by primary team:     Afib with RVR: Noted 10/18, started on amiodarone drip.  Currently in sinus rhythm.    - Management per ICU team     Elevated LFTs: Possible shock liver post-op.  Initial ALT//230 evening of surgery, then with marked increase to 1550/1246 POD #1.  Gradual improvement.  - Daily LFTs     BRENNAN: Baseline creatinine 0.7, increased to 1.2 POD #1 and further increased to 2.05 POD #2.  S/p 3.5 L volume resuscitation the first 12 hours post-op for rising lactic acid (peak 9).  UO also downtrending.  Improving with aggressive diuresis.  - Bactrim held as above  - Management per ICU team    We appreciate the excellent care provided by the CVICU and CVTS teams.  Recommendations communicated via in person rounding and this note.  Will continue to follow along closely, please do not hesitate to call with any questions or  "concerns.    Patient seen and discussed with Dr. Mckeon.    Keyla Rice PA-C  Inpatient [facility]  Pulmonary CF/Transplant     Subjective & Interval History:     Difficulty weaning sedation due to increased RR and HTN.  Opens eyes although not following commands when weaned.  Intubated on CMV 22/400/10/40 with Caroline at 4.  Bronch yesterday with tan secretions to LLL.  Received chest physiotherapy once.  Remains on epi, increased overnight to maintain MAP.  Tmax 101.5, WBC elevated although down trending.  Net negative 2.7L with Bumex drip and Diamox.  OG to LIS with large output, AXR showed NJ coiled in stomach, repositioned by radiology.  Having loose BMs.      Review of Systems:     ROS as above otherwise limited due to intubation and sedation.    Physical Exam:     Vital signs:  Temp: 99.3  F (37.4  C) Temp src: Bladder   Pulse: 77   Resp: 23 SpO2: 99 % O2 Device: Mechanical Ventilator Oxygen Delivery: 15 LPM Height: 162.6 cm (5' 4\") Weight: 73 kg (160 lb 15 oz)  I/O:     Intake/Output Summary (Last 24 hours) at 10/21/2021 0755  Last data filed at 10/21/2021 0700  Gross per 24 hour   Intake 3577.78 ml   Output 6410 ml   Net -2832.22 ml     Constitutional: Lying in bed, in no apparent distress.   HEENT: Eyes with pink conjunctivae, anicteric.  Orally intubated via ETT.  Nasal FT.  PULM: Mildly diminished air flow to bases bilaterally.  Coarse t/o.  Non-labored breathing on full vent support.  CV: Normal S1 and S2.  RRR.  No murmur, gallop, or rub.  No peripheral edema.   ABD: Hypoactive BS, soft, nondistended.    MSK: No apparent muscle wasting.   NEURO: Sedated.  SKIN: Warm, dry.  No rash on limited exam.  Clamshell incision covered with wound vac.  PSYCH: Calm.     Lines, Drains, and Devices:  PICC Triple Lumen Right  (Active)   Site Assessment WDL 10/21/21 0400   External Cath Length (cm) 0 cm 10/11/21 1140   Dressing Intervention Chlorhexidine patch;Transparent 10/21/21 0400   Dressing Change Due 10/24/21 10/21/21 " 0400   PICC Comment CDI 10/21/21 0400   Mosley - Status infusing 10/21/21 0400   Gray - Cap Change Due 10/22/21 10/21/21 0400   Red - Status infusing 10/21/21 0400   Red - Cap Change Due 10/22/21 10/21/21 0400   White - Status infusing 10/21/21 0400   White - Cap Change Due 10/22/21 10/21/21 0400   Extravasation? No 10/21/21 0400   Line Necessity Yes, meets criteria 10/21/21 0400   Number of days:        Arterial Line 10/16/21 (Active)   Site Assessment WDL 10/21/21 0400   Line Status Pulsatile blood flow 10/21/21 0400   Arterine Line Cap Change Due 10/22/21 10/21/21 0400   Art Line Waveform Appropriate 10/21/21 0400   Art Line Interventions Leveled;Connections checked and tightened;Flushed per protocol 10/21/21 0400   Color/Movement/Sensation Capillary refill less than 3 sec 10/21/21 0400   Line Necessity Yes, meets criteria 10/21/21 0400   Dressing Type Transparent 10/21/21 0400   Dressing Status Clean, dry, intact 10/21/21 0400   Dressing Intervention Dressing changed/new dressing 10/17/21 0400   Dressing Change Due 10/24/21 10/21/21 0400   Number of days: 5       CVC Double Lumen Right Internal jugular (Active)   Site Assessment WDL 10/21/21 0400   Dressing Type Chlorhexidine sponge;Transparent 10/21/21 0400   Dressing Status clean;dry;intact 10/21/21 0400   Dressing Intervention new dressing;dressing changed 10/17/21 0000   Dressing Change Due 10/24/21 10/21/21 0400   Line Necessity yes, meets criteria 10/21/21 0400   Brown - Status saline locked 10/21/21 0400   Brown - Cap Change Due 10/22/21 10/21/21 0400   White - Status infusing 10/21/21 0400   White - Cap Change Due 10/22/21 10/21/21 0400   Phlebitis Scale 0-->no symptoms 10/21/21 0400   Infiltration? no 10/21/21 0400   Infiltration Scale 0 10/20/21 1600   Infiltration Site Treatment Method  None 10/20/21 1600   CVC Comment MAC 10/21/21 0400   Number of days: 5       Pulmonary Artery Catheter - Triple Lumen 10/16/21 1500 Right internal jugular vein (Active)    Dressing dressing dry and intact 10/21/21 0400   Securement secured with sutures 10/21/21 0400   PA Catheter Markings (cm) 47 10/21/21 0400   Phlebitis 0-->no symptoms 10/21/21 0400   Infiltration 0-->no symptoms 10/21/21 0400   Waveform normal 10/21/21 0400   Lumen A - Color WHITE 10/21/21 0400   Lumen A - Status transduced 10/21/21 0400   Lumen A - Cap Change Due 10/22/21 10/21/21 0400   Lumen B - Color YELLOW 10/21/21 0400   Lumen B - Status transduced 10/21/21 0400   Lumen C - Color BLUE 10/21/21 0400   Lumen C - Status infusing 10/21/21 0400   Lumen C - Cap Change Due 10/22/21 10/21/21 0400   Number of days: 5     Data:     LABS    CMP:   Recent Labs   Lab 10/21/21  0652 10/21/21  0604 10/21/21  0515 10/21/21  0405 10/21/21  0354 10/21/21  0307 10/20/21  2213 10/20/21  2211 10/20/21  1616 10/20/21  1611 10/20/21  1004 10/20/21  0957 10/20/21  0316 10/20/21  0308   NA  --   --   --   --  145*  --   --  146*  --  143  --  146*   < > 143   POTASSIUM  --   --   --   --  4.2  --   --  4.1  --  4.2  --  4.1   < > 4.1   CHLORIDE  --   --   --   --  104  --   --  106  --  104  --  107   < > 106   CO2  --   --   --   --  34*  --   --  34*  --  36*  --  34*   < > 34*   ANIONGAP  --   --   --   --  7  --   --  6  --  3  --  5   < > 3   * 164* 159* 178* 182*   < >   < > 144*   < > 169*   < > 45*   < > 175*   BUN  --   --   --   --  55*  --   --  59*  --  52*  --  54*   < > 50*   CR  --   --   --   --  1.54*  --   --  1.60*  --  1.53*  --  1.60*   < > 1.64*   GFRESTIMATED  --   --   --   --  50*  --   --  47*  --  50*  --  47*   < > 46*   ERIK  --   --   --   --  8.3*  --   --  7.7*  --  8.5  --  8.0*   < > 7.8*   MAG  --   --   --   --  2.3  --   --  2.2  --  2.1  --   --   --  2.2   PHOS  --   --   --   --  4.9*  --   --  5.4*  --  3.1  --   --   --  3.4   PROTTOTAL  --   --   --   --  4.8*  --   --  4.7*  --  4.9*  --  4.7*   < > 4.4*   ALBUMIN  --   --   --   --  1.4*  --   --  1.3*  --  1.5*  --  1.5*   < >  1.5*   BILITOTAL  --   --   --   --  0.4  --   --  0.3  --  0.5  --  0.6   < > 0.4   ALKPHOS  --   --   --   --  195*  --   --  194*  --  200*  --  214*   < > 206*   AST  --   --   --   --  66*  --   --  86*  --  99*  --  128*   < > 146*   ALT  --   --   --   --  558*  --   --  648*  --  738*  --  851*   < > 852*    < > = values in this interval not displayed.     CBC:   Recent Labs   Lab 10/21/21  0354 10/20/21  0308 10/19/21  1008 10/19/21  0338 10/18/21  1718 10/18/21  0403   WBC 26.5* 27.4*  --  32.4*  --  44.5*   RBC 2.77* 2.77*  --  2.47*  --  2.93*   HGB 8.3* 8.4* 9.0* 7.6*   < > 8.7*   HCT 26.7* 26.0*  --  23.2*  --  26.5*   MCV 96 94  --  94  --  90   MCH 30.0 30.3  --  30.8  --  29.7   MCHC 31.1* 32.3  --  32.8  --  32.8   RDW 17.0* 16.2*  --  16.4*  --  17.0*   * 100*  --  126*  --  211    < > = values in this interval not displayed.       INR:   Recent Labs   Lab 10/21/21  0354 10/20/21  1611 10/20/21  0308 10/19/21  1559   INR 1.38* 1.25* 1.25* 1.30*       Glucose:   Recent Labs   Lab 10/21/21  0652 10/21/21  0604 10/21/21  0515 10/21/21  0405 10/21/21  0354 10/21/21  0307   * 164* 159* 178* 182* 161*       Blood Gas:   Recent Labs   Lab 10/21/21  0354 10/21/21  0002 10/21/21  0001 10/20/21  1948 10/20/21  1948   PHV 7.44* 7.40  --   --  7.43   PCO2V 51* 58*  --   --  53*   PO2V 32 35  --   --  32   HCO3V 35* 36*  --   --  35*   LORI 9.5* 9.7*  --   --  9.0*   O2PER 40  40 50 50   < > 50  40    < > = values in this interval not displayed.       Culture Data No results for input(s): CULT in the last 168 hours.    Virology Data:   Lab Results   Component Value Date    FLUAH1 Negative 10/17/2021    FLUAH3 Negative 10/17/2021    HY8183 Negative 10/17/2021    IFLUB Negative 10/17/2021    RSVA Negative 10/17/2021    RSVB Negative 10/17/2021    PIV1 Negative 10/17/2021    PIV2 Negative 10/17/2021    PIV3 Negative 10/17/2021    HMPV Negative 10/17/2021    HRVS Negative 10/17/2021    ADVBE  Negative 10/17/2021    ADVC Negative 10/17/2021       Historical CMV results (last 3 of prior testing):  No results found for: CMVQNT  No results found for: CMVLOG    Urine Studies    Recent Labs   Lab Test 10/17/21  1642 10/17/21  1041   URINEPH 5.0 5.0   NITRITE Negative Negative   LEUKEST Negative Trace*   WBCU 25* 44*       Most Recent [place] Pulmonary Function Testing (FVC/FEV1 only)  No results found for: 20002  No results found for: 20003  No results found for: 20015  No results found for: 20016    IMAGING    Recent Results (from the past 48 hour(s))   US Upper Extremity Venous Duplex Right Portable    Narrative    EXAMINATION: DOPPLER VENOUS ULTRASOUND OF THE RIGHT UPPER EXTREMITY,  10/19/2021 9:53 AM     COMPARISON: None.    HISTORY: Right upper extremity swelling    TECHNIQUE:  Gray-scale evaluation with compression, spectral flow and  color Doppler assessment of the deep venous system of the right upper  extremity.    FINDINGS:  Normal blood flow and waveforms are demonstrated in the internal  jugular vein and there is no evidence of echogenic thrombus within the  lumen. There is normal compressibility of the brachial, basilic and  cephalic veins.  The right innominate, subclavian, and axillary veins are obscured due  to subcutaneous emphysema.    Right internal jugular central catheter in place without evidence of  echogenic thrombus in the lumen.  Right basilic venous approach PICC line starting in the antecubital  fossa.      Impression    IMPRESSION:  1.  No sonographic evidence of left upper extremity deep venous  thrombosis.    I have personally reviewed the examination and initial interpretation  and I agree with the findings.    JODI MENDEZ MD         SYSTEM ID:  [id number]   XR Chest Port 1 View    Narrative    Chest one view portable    HISTORY: Chest tube output increased    COMPARISON STUDY: Same day at 00 52    FINDINGS: Changes of bilateral lung transplant. Endotracheal  tube,  bilateral chest tubes, clamshell sternotomy, feeding tube, NG tube,  right IJ [place]-Sintia catheter unchanged. Right PICC in the SVC with tip  not well visualized from overlying catheters. Mediastinal drain in  place. Cardiac silhouette is enlarged. Interstitial opacities  bilaterally unchanged. Subcutaneous emphysema bilaterally unchanged.  Bibasilar atelectasis.      Impression    IMPRESSION: Interstitial edema and bibasilar atelectasis.    DAVIN ANGUIANO MD         SYSTEM ID:  [id number]   XR Chest Port 1 View    Narrative    EXAMINATION: XR CHEST PORT 1 VIEW, 10/20/2021 4:03 AM    INDICATION: s/p lung transplant    COMPARISON: 10/19/2021    FINDINGS: Single portable 30 degree upright AP radiograph of the  chest. ET tube projects at the mid thoracic trachea. Feeding tube  courses below the left hemidiaphragm, tip is not within view. NG/OG  side-port projects over the stomach. Right IJ [place]-Sintia catheter with  tip terminating over the proximal right main pulmonary artery. Right  upper extremity PICC line, tip is obscured by [place]-Sintia catheter.  Bilateral chest tubes and mediastinal drain.    Trachea is midline. The cardiomediastinal silhouette is indistinct.  Small bilateral pleural effusions. No appreciable pneumothorax.  Unchanged interstitial and airspace opacities.    Partially visualized upper abdomen is unremarkable. Subcutaneous  emphysema along the left chest wall and right base of the neck.      Impression    IMPRESSION:  1. Postsurgical changes of bilateral lung transplant.  2. Bilateral pleural effusions with overlying atelectasis. Unchanged  bilateral interstitial and airspace opacities.  3. No appreciable pneumothorax.    I have personally reviewed the examination and initial interpretation  and I agree with the findings.    ZHANNA DONG MD         SYSTEM ID:  [id number]   XR Abdomen Port 1 View    Narrative    EXAM: XR ABDOMEN PORT 1 VIEWS  10/20/2021 11:41 AM      HISTORY: Increased OG  output    COMPARISON: Abdominal radiograph 10/16/2021    FINDINGS: Portable abdominal radiograph. Gastric tube terminating over  the stomach. Enteric tube terminating over the gastric pylorus.  Nonobstructive bowel gas pattern. Multiple small radiodensities  projecting over the right colon, presumably intraluminal. No  pneumatosis. No portal venous gas. Watson catheter projecting over the  pelvis. Partially visualized mediastinal drain and right basilar chest  tube. Bibasilar opacities.      Impression    IMPRESSION:   1. Nonobstructive bowel gas pattern.  2. Feeding tube is coiled within the stomach.  3. Gastric tube tip and sidehole project over the stomach.    I have personally reviewed the examination and initial interpretation  and I agree with the findings.    FREDDY LEAHY MD         SYSTEM ID:  [id number]   XR Feeding Tube Placement    Narrative    Feeding tube placement: 10/20/2021 4:15 PM    Comparison: None    Indication: Advancement of feeding tube    Fluoroscopy time: 1.42 minutes    Technique: After injection of Xylocaine gel into the left nostril, a  feeding tube was advanced under fluoroscopic guidance.    Findings: The feeding tube was advanced with the tip in the third  portion of the duodenum. A small amount of barium was injected to  demonstrate placement within the small bowel. The feeding tube was  then flushed with normal saline. The feeding tube was secured using a  nasal bridle.      Impression    Impression: Uncomplicated feeding tube advancement with tip in the  third portion of the duodenum.    I, FITO PERALES MD, attest that I was immediately available to  provide guidance and assistance during the entirety of the procedure.  The examination was performed portable in the ICU therefore a  radiologist was not directly present during tube placement.    I have personally reviewed the examination and initial interpretation  and I agree with the findings.    FITO PERALES MD         SYSTEM  ID:  [id number]   US Upper Extremity Venous Duplex Left    Narrative    EXAMINATION: DOPPLER VENOUS ULTRASOUND OF THE LEFT UPPER EXTREMITY,  10/20/2021 4:29 PM     COMPARISON: None.    HISTORY: s/p surgery- assess for DVT. Post op fever      TECHNIQUE:  Gray-scale evaluation with compression, spectral flow and  color Doppler assessment of the deep venous system of the left upper  extremity.    FINDINGS:  Left: Normal blood flow and waveforms are demonstrated in the internal  jugular, innominate, subclavian, and axillary veins. There is normal  compressibility of the paired brachial, basilic and cephalic veins.      Impression    IMPRESSION:  No evidence of left upper extremity deep venous thrombosis.    I have personally reviewed the examination and initial interpretation  and I agree with the findings.    FREDDY LEAHY MD         SYSTEM ID:  [id number]   US Lower Extremity Venous Duplex Bilateral    Narrative    EXAMINATION: DOPPLER VENOUS ULTRASOUND OF BILATERAL LOWER EXTREMITIES,  10/20/2021 4:29 PM     COMPARISON: None.    HISTORY: To assess for DVT status post surgery. Postop fever.    TECHNIQUE:  Gray-scale evaluation with compression, spectral flow and  color Doppler assessment of the deep venous system of both legs from  groin to knee, and then at the ankles.    FINDINGS:  In both lower extremities, the common femoral, femoral, popliteal and  posterior tibial veins demonstrate normal compressibility and blood  flow.      Impression    IMPRESSION:  No evidence of deep venous thrombosis in either lower extremity.    I have personally reviewed the examination and initial interpretation  and I agree with the findings.    FREDDY LEAHY MD         SYSTEM ID:  [id number]   XR Chest Port 1 View    Narrative    EXAMINATION: XR CHEST PORT 1 VIEW, 10/21/2021 1:56 AM    INDICATION: s/p lung transplant    COMPARISON: 10/20/2021    FINDINGS: ET tube projects 1.5 cm above the shiv. Stable bilateral  chest tubes and  mediastinal drain. Right IJ [place]-Sintia catheter with  tip terminating over the proximal right main pulmonary artery. Enteric  tube courses below the left hemidiaphragm, tip is not within view.  NG/OG tube courses below the left hemidiaphragm, tip is not within  view. Right upper extremity PICC line, tip is obscured by additional  lines projecting over the mediastinum. Intact clam shell sternotomy  wires. Cutaneous staples project over the low thorax.    Postsurgical changes of bilateral lung transplant. Trachea is midline.  The cardiomediastinal silhouette is indistinct. Small bilateral  pleural effusions. Bibasilar atelectasis. No appreciable pneumothorax.  Slightly decreased bilateral interstitial and airspace opacities.    Partially visualized upper abdomen is unremarkable. No acute osseous  abnormality. Extensive subcutaneous emphysema along the left chest  wall and base of the right neck.      Impression    IMPRESSION:  1. Postsurgical changes of bilateral lung transplant.  2. Stable support devices.  3. Small bilateral pleural effusions with overlying atelectasis and  decreased bilateral interstitial and airspace opacities.  4. No appreciable pneumothorax.    I have personally reviewed the examination and initial interpretation  and I agree with the findings.    MAURICIO NAVAS MD         SYSTEM ID:  [id number]

## 2021-10-21 NOTE — PLAN OF CARE
RASS -4, pt does not respond to stimuli unless weaned off sedation. Prop @ 40, precedex @ 1.2 & Fent @ 150. Episode of RR 30-40s and HTN, Dr. Green notified and agreed to increase sedation & keep nitric at 4 overnight. Tmax 101.5. SR. Epi @ 0.06, amio @ 0.5. CVP 8, PA 32/16, CO 5, CI 2.7, SVR 1035, SvO2 59%. CMV, , RR 22, PEEP 10, FiO2 40%. Nitric @ 4. OG to LIS. NJ w/ TF @ 60mL/hr. Bumex gtt @ 2, adequate metzger output. 5 CTs to sxn. Insulin gtt @ 7 units/hr. CVTS primary.

## 2021-10-21 NOTE — PROGRESS NOTES
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    OVERNIGHT EVENTS:  Received Diamox x       TODAY'S PROGRESS:   - Continue Bumex 1 mg/hr- I/O goal: -500 ml- 1L  - Wean sedation  - Goal CI>2, wean Epi  - Norepi added for MAPs <65  - Follow Fungitel  - Weaning Caroline  - Diamox 500 mg po x 3  - ivig today    PLAN:  Neuro/ pain/ sedation:  Acute Postoperative pain  Anxiety  Depression  - Monitor neurological status. Notify the MD for any acute changes in exam.  - Pain: Fentanyl gtt. Tylenol, Robaxin, Gabapentin ramses. PRN oxycodone, IV dilaudid,  - Sedation: Propofol, Dexmedetomdine gtt. Wean as tolerated.  - RASS goal: 0 to -1  - Agitation: Seroquel 25 mg q8  - Continue PTA Escitalopram, Melatonin     Pulmonary care:   Acute Hypoxic Respiratory Failure  S/p B/l lung transplant  SALTY on home CPAP  - Ventilator Settings:CMV- 22/400/10/50  - Caroline- 4, wean as tolerated  - Bronchoscopy(10/17) did not show much secretions, edema +  - Levo-albuterol nebs and Mucomyst  - Chest Physiotherapy  - Daily CXR  - Titrate supplemental oxygen to maintain saturation above 92%.  - Pulmonary hygiene: Incentive spirometer every 15- 30 minutes when awake, flutter valve, C&DB  - Diuresis: Bumex infusion 1 mg/hr  - Bronchoscopy (10/20)- showed secretions in LLL. Plan to do bronchoscopy on alternate days.      Cardiovascular:    S/p b/l lung transplant on 10/16/21 by Dr. Corral  HTN  A.fib- resolved  Cardiogenic Shock  Recent echo on 09/07/21 with LVEF of 60-65%. Pulmonary HTN.     - Cardiac Cath(9/8/21)-     Right sided filling pressures are mildly elevated.    Moderately elevated pulmonary artery  hypertension.    Left sided filling pressures are normal.    Normal cardiac output level.    Normal coronary arteries with mild tortuosity     - Monitor hemodynamic status.   - Lactic Acid: 1.9  - Goal MAP >65  - Pressors: Epi, Norepi gtt. Goal CI>2  - Statin: none  - ASA: none  - PTA meds: Amlodipine withheld  - Amiodarone infusion- 0.5 mg/min, transition to Amiodarone po 400 mg BID  - EKG today to assess QT interval           GI care/ Nutrition:   Elevated LFTs- resolving  - Diet: NJ feeds. Advance feeds  - PPI  - Continue bowel regimen: miralax, senna  - Trend LFTs    Renal/ Fluid Balance/ Electrolytes:   Acute Kidney Injury  BL creat appears to be ~ 0.7   - Diuresis: Bumex infusion 1 mg/hr with I/O goal: -500 to 1 L  - Diamox 500 mg po x 3 for metabolic alkalosis  - Strict I/O, daily weights  - Avoid/limit nephrotoxins as able  - Replete lytes PRN per protocol     Endocrine:    Stress induced hyperglycemia  Hypothyroidism  DM  RA  Preop A1c -6.6  - Insulin gtt  - Goal BG <180 for optimal healing  - PTA meds: Solumedrol 125 mg q6, Synthroid 25 mcg  - Received 2 doses of Rituximab in 6/21  - Rheumatology consult, will reach out to them later     ID/ Antibiotics:  Immunosuppression  Stress induced leukocytosis  - pre-op WBC -22.9  - Completed perioperative regimen- Ceftazidime, Vancomycin  - Ceftazidime restarted(10/19-)  - F/U Fungitel  - Nystatin, Acyclovir  - Tacrolimus  - Basiliximab on POD#4(10/20/21)  - Methylpred followed by oral prednisone  - Continue to monitor fever curve, WBC and inflammatory markers as appropriate  - Follow up cultures  - (PTA Bactrim, Cefepime, Doxycycline)  -Azathioprine to be avoided after transplant given low TPMT based on recommendations of transplant pulmonology, however if used, then azathioprine to be used in a low-dose     Heme:     Acute blood loss anemia  Hypogammaglobulinemia  Theombocytopenia  Pre-op Hb- 11.1, Platelet- 224  HIT panel sent  IVIG today  No s/sx active  bleeding  - CBC in AM  - ID as above     MSK/ Skin:  Sternotomy  Surgical Incision  - Sternal precautions  - Postoperative incision management per protocol  - PT/OT/CR     Prophylaxis:    - Mechanical prophylaxis for DVT: SCDs  - Chemical DVT prophylaxis: Heparin subcutaneous held  - PPI for 30 days postoperatively     Lines/ tubes/ drains:  - Right PICC(pre-op)  - Right radial A line(10/16)  - Right CVC(10/16)  - Right [place](10/16)  - ETT(10/16)  - Watson(10/16)  - Chest tubes- 4 pleural, 1 mediastinal(10/16)  - OG(10/16)   - NJ(10/18)     Disposition:  - CV ICU.      Patient seen, findings and plan discussed with CV ICU staff. Will consider Trach if continues to be intubated till Monday.     Eunice Gutiérrez MD  PGY-3  Anesthesia    ====================================    SUBJECTIVE:   Patient is sedated and intubated. Continues to be dependent on ventilator.    OBJECTIVE:   1. VITAL SIGNS:   Temp:  [98.8  F (37.1  C)-101.5  F (38.6  C)] 99.3  F (37.4  C)  Pulse:  [68-95] 81  Resp:  [22-32] 24  MAP:  [54 mmHg-99 mmHg] 69 mmHg  Arterial Line BP: ()/(28-68) 124/52  FiO2 (%):  [40 %-50 %] 40 %  SpO2:  [94 %-100 %] 98 %  Ventilation Mode: CMV/AC  (Continuous Mandatory Ventilation/ Assist Control)  FiO2 (%): 40 %  Rate Set (breaths/minute): 22 breaths/min  Tidal Volume Set (mL): 400 mL  PEEP (cm H2O): 10 cmH2O  Oxygen Concentration (%): 50 %  Resp: 24      2. INTAKE/ OUTPUT:   I/O last 3 completed shifts:  In: 3560.73 [I.V.:2040.73; NG/GT:380]  Out: 6165 [Urine:4615; Emesis/NG output:1050; Chest Tube:500]    3. PHYSICAL EXAMINATION:   General: sedated and intubated  Neuro: can't be assessed  Resp: ventilator dependent  CV: RRR  Abdomen: Soft, Non-distended, Non-tender  Incisions: c/d/i  Extremities: warm and well perfused    4. INVESTIGATIONS:   Arterial Blood Gases   Recent Labs   Lab 10/21/21  0354 10/21/21  0001 10/20/21  1948 10/20/21  1611   PH 7.48* 7.44 7.48* 7.51*   PCO2 48* 47* 45 46*   PO2 127* 159* 116* 110*    HCO3 36* 32* 33* 37*     Complete Blood Count   Recent Labs   Lab 10/21/21  0354 10/20/21  0308 10/19/21  1008 10/19/21  0338 10/18/21  1718 10/18/21  0403   WBC 26.5* 27.4*  --  32.4*  --  44.5*   HGB 8.3* 8.4* 9.0* 7.6*   < > 8.7*   * 100*  --  126*  --  211    < > = values in this interval not displayed.     Basic Metabolic Panel  Recent Labs   Lab 10/21/21  0604 10/21/21  0515 10/21/21  0405 10/21/21  0354 10/20/21  2213 10/20/21  2211 10/20/21  1616 10/20/21  1611 10/20/21  1004 10/20/21  0957   NA  --   --   --  145*  --  146*  --  143  --  146*   POTASSIUM  --   --   --  4.2  --  4.1  --  4.2  --  4.1   CHLORIDE  --   --   --  104  --  106  --  104  --  107   CO2  --   --   --  34*  --  34*  --  36*  --  34*   BUN  --   --   --  55*  --  59*  --  52*  --  54*   CR  --   --   --  1.54*  --  1.60*  --  1.53*  --  1.60*   * 159* 178* 182*   < > 144*   < > 169*   < > 45*    < > = values in this interval not displayed.     Liver Function Tests  Recent Labs   Lab 10/21/21  0354 10/20/21  2211 10/20/21  1611 10/20/21  0957 10/20/21  0308 10/20/21  0308 10/19/21  2224 10/19/21  1559   AST 66* 86* 99* 128*   < > 146*   < > 207*   * 648* 738* 851*   < > 852*   < > 1,016*   ALKPHOS 195* 194* 200* 214*   < > 206*   < > 182*   BILITOTAL 0.4 0.3 0.5 0.6   < > 0.4   < > 0.5   ALBUMIN 1.4* 1.3* 1.5* 1.5*   < > 1.5*   < > 1.3*   INR 1.38*  --  1.25*  --   --  1.25*  --  1.30*    < > = values in this interval not displayed.     Pancreatic Enzymes  No lab results found in last 7 days.  Coagulation Profile  Recent Labs   Lab 10/21/21  0354 10/20/21  1611 10/20/21  0308 10/19/21  1559 10/17/21  1535 10/17/21  0346 10/16/21  2032 10/16/21  2032 10/16/21  1535 10/16/21  1535 10/15/21  0907 10/15/21  0907   INR 1.38* 1.25* 1.25* 1.30*   < > 1.77*   < > 1.54*   < > 1.38*   < > 1.02   PTT  --   --   --   --   --  41*  --  42*  --  51*  --  30    < > = values in this interval not displayed.         5. RADIOLOGY:    Recent Results (from the past 24 hour(s))   XR Abdomen Port 1 View    Narrative    EXAM: XR ABDOMEN PORT 1 VIEWS  10/20/2021 11:41 AM      HISTORY: Increased OG output    COMPARISON: Abdominal radiograph 10/16/2021    FINDINGS: Portable abdominal radiograph. Gastric tube terminating over  the stomach. Enteric tube terminating over the gastric pylorus.  Nonobstructive bowel gas pattern. Multiple small radiodensities  projecting over the right colon, presumably intraluminal. No  pneumatosis. No portal venous gas. Watson catheter projecting over the  pelvis. Partially visualized mediastinal drain and right basilar chest  tube. Bibasilar opacities.      Impression    IMPRESSION:   1. Nonobstructive bowel gas pattern.  2. Feeding tube is coiled within the stomach.  3. Gastric tube tip and sidehole project over the stomach.    I have personally reviewed the examination and initial interpretation  and I agree with the findings.    FREDDY LEAHY MD         SYSTEM ID:  [id number]   XR Feeding Tube Placement    Narrative    Feeding tube placement: 10/20/2021 4:15 PM    Comparison: None    Indication: Advancement of feeding tube    Fluoroscopy time: 1.42 minutes    Technique: After injection of Xylocaine gel into the left nostril, a  feeding tube was advanced under fluoroscopic guidance.    Findings: The feeding tube was advanced with the tip in the third  portion of the duodenum. A small amount of barium was injected to  demonstrate placement within the small bowel. The feeding tube was  then flushed with normal saline. The feeding tube was secured using a  nasal bridle.      Impression    Impression: Uncomplicated feeding tube advancement with tip in the  third portion of the duodenum.    I, FITO PERALES MD, attest that I was immediately available to  provide guidance and assistance during the entirety of the procedure.  The examination was performed portable in the ICU therefore a  radiologist was not directly present  during tube placement.    I have personally reviewed the examination and initial interpretation  and I agree with the findings.    FITO PERALES MD         SYSTEM ID:  [id number]   US Upper Extremity Venous Duplex Left    Narrative    EXAMINATION: DOPPLER VENOUS ULTRASOUND OF THE LEFT UPPER EXTREMITY,  10/20/2021 4:29 PM     COMPARISON: None.    HISTORY: s/p surgery- assess for DVT. Post op fever      TECHNIQUE:  Gray-scale evaluation with compression, spectral flow and  color Doppler assessment of the deep venous system of the left upper  extremity.    FINDINGS:  Left: Normal blood flow and waveforms are demonstrated in the internal  jugular, innominate, subclavian, and axillary veins. There is normal  compressibility of the paired brachial, basilic and cephalic veins.      Impression    IMPRESSION:  No evidence of left upper extremity deep venous thrombosis.    I have personally reviewed the examination and initial interpretation  and I agree with the findings.    FREDDY LEAHY MD         SYSTEM ID:  [id number]   US Lower Extremity Venous Duplex Bilateral    Narrative    EXAMINATION: DOPPLER VENOUS ULTRASOUND OF BILATERAL LOWER EXTREMITIES,  10/20/2021 4:29 PM     COMPARISON: None.    HISTORY: To assess for DVT status post surgery. Postop fever.    TECHNIQUE:  Gray-scale evaluation with compression, spectral flow and  color Doppler assessment of the deep venous system of both legs from  groin to knee, and then at the ankles.    FINDINGS:  In both lower extremities, the common femoral, femoral, popliteal and  posterior tibial veins demonstrate normal compressibility and blood  flow.      Impression    IMPRESSION:  No evidence of deep venous thrombosis in either lower extremity.    I have personally reviewed the examination and initial interpretation  and I agree with the findings.    FREDDY LEAHY MD         SYSTEM ID:  [id number]   XR Chest Port 1 View    Narrative    EXAMINATION: XR CHEST PORT 1 VIEW,  10/21/2021 1:56 AM    INDICATION: s/p lung transplant    COMPARISON: 10/20/2021    FINDINGS: ET tube projects 1.5 cm above the shiv. Stable bilateral  chest tubes and mediastinal drain. Right IJ [place] catheter with  tip terminating over the proximal right main pulmonary artery. Enteric  tube courses below the left hemidiaphragm, tip is not within view.  NG/OG tube courses below the left hemidiaphragm, tip is not within  view. Right upper extremity PICC line, tip is obscured by additional  lines projecting over the mediastinum. Intact clam shell sternotomy  wires. Cutaneous staples project over the low thorax.    Postsurgical changes of bilateral lung transplant. Trachea is midline.  The cardiomediastinal silhouette is indistinct. Small bilateral  pleural effusions. Bibasilar atelectasis. No appreciable pneumothorax.  Slightly decreased bilateral interstitial and airspace opacities.    Partially visualized upper abdomen is unremarkable. No acute osseous  abnormality. Extensive subcutaneous emphysema along the left chest  wall and base of the right neck.      Impression    IMPRESSION:  1. Postsurgical changes of bilateral lung transplant.  2. Stable support devices.  3. Small bilateral pleural effusions with overlying atelectasis and  decreased bilateral interstitial and airspace opacities.  4. No appreciable pneumothorax.    I have personally reviewed the examination and initial interpretation  and I agree with the findings.    MAURICIO NAVAS MD         SYSTEM ID:  [id number]       =========================================

## 2021-10-22 ENCOUNTER — APPOINTMENT (OUTPATIENT)
Dept: CT IMAGING | Facility: CLINIC | Age: 56
End: 2021-10-22
Attending: STUDENT IN AN ORGANIZED HEALTH CARE EDUCATION/TRAINING PROGRAM
Payer: COMMERCIAL

## 2021-10-22 ENCOUNTER — APPOINTMENT (OUTPATIENT)
Dept: NEUROLOGY | Facility: CLINIC | Age: 56
End: 2021-10-22
Attending: NURSE PRACTITIONER
Payer: COMMERCIAL

## 2021-10-22 ENCOUNTER — APPOINTMENT (OUTPATIENT)
Dept: GENERAL RADIOLOGY | Facility: CLINIC | Age: 56
End: 2021-10-22
Attending: STUDENT IN AN ORGANIZED HEALTH CARE EDUCATION/TRAINING PROGRAM
Payer: COMMERCIAL

## 2021-10-22 ENCOUNTER — APPOINTMENT (OUTPATIENT)
Dept: CT IMAGING | Facility: CLINIC | Age: 56
End: 2021-10-22
Attending: NURSE PRACTITIONER
Payer: COMMERCIAL

## 2021-10-22 LAB
1,3 BETA GLUCAN SER-MCNC: 399 PG/ML
ALBUMIN SERPL-MCNC: 1.1 G/DL (ref 3.4–5)
ALBUMIN SERPL-MCNC: 1.2 G/DL (ref 3.4–5)
ALBUMIN SERPL-MCNC: 1.2 G/DL (ref 3.4–5)
ALBUMIN SERPL-MCNC: 1.3 G/DL (ref 3.4–5)
ALBUMIN UR-MCNC: 50 MG/DL
ALP SERPL-CCNC: 156 U/L (ref 40–150)
ALP SERPL-CCNC: 163 U/L (ref 40–150)
ALP SERPL-CCNC: 178 U/L (ref 40–150)
ALP SERPL-CCNC: 183 U/L (ref 40–150)
ALT SERPL W P-5'-P-CCNC: 236 U/L (ref 0–70)
ALT SERPL W P-5'-P-CCNC: 281 U/L (ref 0–70)
ALT SERPL W P-5'-P-CCNC: 281 U/L (ref 0–70)
ALT SERPL W P-5'-P-CCNC: 314 U/L (ref 0–70)
AMMONIA PLAS-SCNC: 39 UMOL/L (ref 10–50)
ANION GAP SERPL CALCULATED.3IONS-SCNC: 6 MMOL/L (ref 3–14)
ANION GAP SERPL CALCULATED.3IONS-SCNC: 7 MMOL/L (ref 3–14)
APPEARANCE UR: CLEAR
APTT PPP: 43 SECONDS (ref 22–38)
AST SERPL W P-5'-P-CCNC: 27 U/L (ref 0–45)
AST SERPL W P-5'-P-CCNC: 28 U/L (ref 0–45)
AST SERPL W P-5'-P-CCNC: 31 U/L (ref 0–45)
AST SERPL W P-5'-P-CCNC: 32 U/L (ref 0–45)
BACTERIA BLD CULT: NO GROWTH
BACTERIA SPEC CULT: ABNORMAL
BACTERIA SPT CULT: ABNORMAL
BACTERIA SPT CULT: ABNORMAL
BASE EXCESS BLDA CALC-SCNC: 10.2 MMOL/L (ref -9–1.8)
BASE EXCESS BLDA CALC-SCNC: 10.9 MMOL/L (ref -9–1.8)
BASE EXCESS BLDA CALC-SCNC: 11.7 MMOL/L (ref -9–1.8)
BASE EXCESS BLDA CALC-SCNC: 3.4 MMOL/L (ref -9–1.8)
BASE EXCESS BLDA CALC-SCNC: 6.8 MMOL/L (ref -9–1.8)
BASE EXCESS BLDA CALC-SCNC: 8.3 MMOL/L (ref -9–1.8)
BASE EXCESS BLDV CALC-SCNC: 10.7 MMOL/L (ref -7.7–1.9)
BASE EXCESS BLDV CALC-SCNC: 11 MMOL/L (ref -7.7–1.9)
BASE EXCESS BLDV CALC-SCNC: 11.2 MMOL/L (ref -7.7–1.9)
BASE EXCESS BLDV CALC-SCNC: 11.4 MMOL/L (ref -7.7–1.9)
BASE EXCESS BLDV CALC-SCNC: 3.1 MMOL/L (ref -7.7–1.9)
BASE EXCESS BLDV CALC-SCNC: 6.6 MMOL/L (ref -7.7–1.9)
BASE EXCESS BLDV CALC-SCNC: 8.4 MMOL/L (ref -7.7–1.9)
BASOPHILS # BLD AUTO: 0 10E3/UL (ref 0–0.2)
BASOPHILS NFR BLD AUTO: 0 %
BILIRUB DIRECT SERPL-MCNC: 0.1 MG/DL (ref 0–0.2)
BILIRUB DIRECT SERPL-MCNC: 0.1 MG/DL (ref 0–0.2)
BILIRUB DIRECT SERPL-MCNC: 0.2 MG/DL (ref 0–0.2)
BILIRUB DIRECT SERPL-MCNC: 0.2 MG/DL (ref 0–0.2)
BILIRUB SERPL-MCNC: 0.2 MG/DL (ref 0.2–1.3)
BILIRUB SERPL-MCNC: 0.3 MG/DL (ref 0.2–1.3)
BILIRUB UR QL STRIP: NEGATIVE
BLD PROD TYP BPU: NORMAL
BLD PROD TYP BPU: NORMAL
BLOOD COMPONENT TYPE: NORMAL
BLOOD COMPONENT TYPE: NORMAL
BUN SERPL-MCNC: 62 MG/DL (ref 7–30)
BUN SERPL-MCNC: 62 MG/DL (ref 7–30)
BUN SERPL-MCNC: 68 MG/DL (ref 7–30)
BUN SERPL-MCNC: 73 MG/DL (ref 7–30)
CALCIUM SERPL-MCNC: 7.9 MG/DL (ref 8.5–10.1)
CALCIUM SERPL-MCNC: 7.9 MG/DL (ref 8.5–10.1)
CALCIUM SERPL-MCNC: 8 MG/DL (ref 8.5–10.1)
CALCIUM SERPL-MCNC: 8.2 MG/DL (ref 8.5–10.1)
CF REDUC 60M P MA LENFR BLD TEG: 0 % (ref 0–15)
CFT BLD TEG: 21.4 MINUTE (ref 1–3)
CHLORIDE BLD-SCNC: 102 MMOL/L (ref 94–109)
CHLORIDE BLD-SCNC: 106 MMOL/L (ref 94–109)
CHLORIDE BLD-SCNC: 109 MMOL/L (ref 94–109)
CHLORIDE BLD-SCNC: 110 MMOL/L (ref 94–109)
CI (COAGULATION INDEX)(Z) NON NATIVE: -21.5 (ref -3–3)
CK SERPL-CCNC: 51 U/L (ref 30–300)
CK SERPL-CCNC: 55 U/L (ref 30–300)
CLOT ANGLE BLD TEG: 12.8 DEGREES (ref 53–72)
CLOT INIT BLD TEG: 22.6 MINUTE (ref 5–10)
CLOT LYSIS 30M P MA LENFR BLD TEG: 0 % (ref 0–8)
CLOT STRENGTH BLD TEG: 9.7 KD/SC (ref 4.5–11)
CO2 SERPL-SCNC: 30 MMOL/L (ref 20–32)
CO2 SERPL-SCNC: 31 MMOL/L (ref 20–32)
CO2 SERPL-SCNC: 34 MMOL/L (ref 20–32)
CO2 SERPL-SCNC: 34 MMOL/L (ref 20–32)
CODING SYSTEM: NORMAL
CODING SYSTEM: NORMAL
COLOR UR AUTO: ABNORMAL
CREAT SERPL-MCNC: 1.37 MG/DL (ref 0.66–1.25)
CREAT SERPL-MCNC: 1.47 MG/DL (ref 0.66–1.25)
CREAT SERPL-MCNC: 1.54 MG/DL (ref 0.66–1.25)
CREAT SERPL-MCNC: 1.67 MG/DL (ref 0.66–1.25)
CROSSMATCH: NORMAL
CROSSMATCH: NORMAL
EOSINOPHIL # BLD AUTO: 0 10E3/UL (ref 0–0.7)
EOSINOPHIL NFR BLD AUTO: 0 %
ERYTHROCYTE [DISTWIDTH] IN BLOOD BY AUTOMATED COUNT: 16.1 % (ref 10–15)
ERYTHROCYTE [DISTWIDTH] IN BLOOD BY AUTOMATED COUNT: 17 % (ref 10–15)
FIBRINOGEN PPP-MCNC: 830 MG/DL (ref 170–490)
GFR SERPL CREATININE-BSD FRML MDRD: 45 ML/MIN/1.73M2
GFR SERPL CREATININE-BSD FRML MDRD: 50 ML/MIN/1.73M2
GFR SERPL CREATININE-BSD FRML MDRD: 53 ML/MIN/1.73M2
GFR SERPL CREATININE-BSD FRML MDRD: 57 ML/MIN/1.73M2
GLUCOSE BLD-MCNC: 148 MG/DL (ref 70–99)
GLUCOSE BLD-MCNC: 178 MG/DL (ref 70–99)
GLUCOSE BLD-MCNC: 201 MG/DL (ref 70–99)
GLUCOSE BLD-MCNC: 92 MG/DL (ref 70–99)
GLUCOSE BLDC GLUCOMTR-MCNC: 101 MG/DL (ref 70–99)
GLUCOSE BLDC GLUCOMTR-MCNC: 143 MG/DL (ref 70–99)
GLUCOSE BLDC GLUCOMTR-MCNC: 146 MG/DL (ref 70–99)
GLUCOSE BLDC GLUCOMTR-MCNC: 157 MG/DL (ref 70–99)
GLUCOSE BLDC GLUCOMTR-MCNC: 159 MG/DL (ref 70–99)
GLUCOSE BLDC GLUCOMTR-MCNC: 168 MG/DL (ref 70–99)
GLUCOSE BLDC GLUCOMTR-MCNC: 170 MG/DL (ref 70–99)
GLUCOSE BLDC GLUCOMTR-MCNC: 181 MG/DL (ref 70–99)
GLUCOSE BLDC GLUCOMTR-MCNC: 183 MG/DL (ref 70–99)
GLUCOSE BLDC GLUCOMTR-MCNC: 183 MG/DL (ref 70–99)
GLUCOSE BLDC GLUCOMTR-MCNC: 192 MG/DL (ref 70–99)
GLUCOSE BLDC GLUCOMTR-MCNC: 195 MG/DL (ref 70–99)
GLUCOSE BLDC GLUCOMTR-MCNC: 195 MG/DL (ref 70–99)
GLUCOSE BLDC GLUCOMTR-MCNC: 88 MG/DL (ref 70–99)
GLUCOSE UR STRIP-MCNC: NEGATIVE MG/DL
GRAM STAIN RESULT: ABNORMAL
HAPTOGLOB SERPL-MCNC: 380 MG/DL (ref 32–197)
HCO3 BLD-SCNC: 27 MMOL/L (ref 21–28)
HCO3 BLD-SCNC: 31 MMOL/L (ref 21–28)
HCO3 BLD-SCNC: 34 MMOL/L (ref 21–28)
HCO3 BLD-SCNC: 35 MMOL/L (ref 21–28)
HCO3 BLD-SCNC: 36 MMOL/L (ref 21–28)
HCO3 BLD-SCNC: 37 MMOL/L (ref 21–28)
HCO3 BLDV-SCNC: 28 MMOL/L (ref 21–28)
HCO3 BLDV-SCNC: 31 MMOL/L (ref 21–28)
HCO3 BLDV-SCNC: 36 MMOL/L (ref 21–28)
HCO3 BLDV-SCNC: 37 MMOL/L (ref 21–28)
HCO3 BLDV-SCNC: 37 MMOL/L (ref 21–28)
HCT VFR BLD AUTO: 21.7 % (ref 40–53)
HCT VFR BLD AUTO: 28 % (ref 40–53)
HGB BLD-MCNC: 10.4 G/DL (ref 13.3–17.7)
HGB BLD-MCNC: 6.6 G/DL (ref 13.3–17.7)
HGB BLD-MCNC: 9 G/DL (ref 13.3–17.7)
HGB BLD-MCNC: 9.1 G/DL (ref 13.3–17.7)
HGB UR QL STRIP: NEGATIVE
IMM GRANULOCYTES # BLD: 0.7 10E3/UL
IMM GRANULOCYTES NFR BLD: 4 %
INR PPP: 1.28 (ref 0.85–1.15)
INR PPP: 1.33 (ref 0.85–1.15)
ISSUE DATE AND TIME: NORMAL
ISSUE DATE AND TIME: NORMAL
KETONES UR STRIP-MCNC: NEGATIVE MG/DL
LACTATE SERPL-SCNC: 1 MMOL/L (ref 0.7–2)
LACTATE SERPL-SCNC: 1.2 MMOL/L (ref 0.7–2)
LACTATE SERPL-SCNC: 1.2 MMOL/L (ref 0.7–2)
LACTATE SERPL-SCNC: 1.3 MMOL/L (ref 0.7–2)
LACTATE SERPL-SCNC: 1.4 MMOL/L (ref 0.7–2)
LACTATE SERPL-SCNC: 1.5 MMOL/L (ref 0.7–2)
LEUKOCYTE ESTERASE UR QL STRIP: NEGATIVE
LYMPHOCYTES # BLD AUTO: 0.5 10E3/UL (ref 0.8–5.3)
LYMPHOCYTES NFR BLD AUTO: 3 %
MAGNESIUM SERPL-MCNC: 2.1 MG/DL (ref 1.6–2.3)
MAGNESIUM SERPL-MCNC: 2.4 MG/DL (ref 1.6–2.3)
MCF BLD TEG: 66.1 MM (ref 50–70)
MCH RBC QN AUTO: 29.6 PG (ref 26.5–33)
MCH RBC QN AUTO: 30.7 PG (ref 26.5–33)
MCHC RBC AUTO-ENTMCNC: 30.4 G/DL (ref 31.5–36.5)
MCHC RBC AUTO-ENTMCNC: 32.5 G/DL (ref 31.5–36.5)
MCV RBC AUTO: 95 FL (ref 78–100)
MCV RBC AUTO: 97 FL (ref 78–100)
MONOCYTES # BLD AUTO: 0.7 10E3/UL (ref 0–1.3)
MONOCYTES NFR BLD AUTO: 4 %
MUCOUS THREADS #/AREA URNS LPF: PRESENT /LPF
NEUTROPHILS # BLD AUTO: 16.7 10E3/UL (ref 1.6–8.3)
NEUTROPHILS NFR BLD AUTO: 89 %
NITRATE UR QL: NEGATIVE
NRBC # BLD AUTO: 0.1 10E3/UL
NRBC BLD AUTO-RTO: 1 /100
O2/TOTAL GAS SETTING VFR VENT: 50 %
O2/TOTAL GAS SETTING VFR VENT: 60 %
OBSERVATION IMP: POSITIVE
OXYHGB MFR BLDV: 58 % (ref 70–75)
OXYHGB MFR BLDV: 60 % (ref 70–75)
OXYHGB MFR BLDV: 63 % (ref 70–75)
OXYHGB MFR BLDV: 67 % (ref 70–75)
OXYHGB MFR BLDV: 68 % (ref 70–75)
OXYHGB MFR BLDV: 69 % (ref 70–75)
OXYHGB MFR BLDV: 78 % (ref 70–75)
PCO2 BLD: 34 MM HG (ref 35–45)
PCO2 BLD: 40 MM HG (ref 35–45)
PCO2 BLD: 40 MM HG (ref 35–45)
PCO2 BLD: 49 MM HG (ref 35–45)
PCO2 BLD: 49 MM HG (ref 35–45)
PCO2 BLD: 63 MM HG (ref 35–45)
PCO2 BLDV: 40 MM HG (ref 40–50)
PCO2 BLDV: 44 MM HG (ref 40–50)
PCO2 BLDV: 48 MM HG (ref 40–50)
PCO2 BLDV: 52 MM HG (ref 40–50)
PCO2 BLDV: 53 MM HG (ref 40–50)
PCO2 BLDV: 56 MM HG (ref 40–50)
PCO2 BLDV: 70 MM HG (ref 40–50)
PH BLD: 7.35 [PH] (ref 7.35–7.45)
PH BLD: 7.47 [PH] (ref 7.35–7.45)
PH BLD: 7.48 [PH] (ref 7.35–7.45)
PH BLD: 7.5 [PH] (ref 7.35–7.45)
PH BLD: 7.51 [PH] (ref 7.35–7.45)
PH BLD: 7.53 [PH] (ref 7.35–7.45)
PH BLDV: 7.31 [PH] (ref 7.32–7.43)
PH BLDV: 7.42 [PH] (ref 7.32–7.43)
PH BLDV: 7.42 [PH] (ref 7.32–7.43)
PH BLDV: 7.44 [PH] (ref 7.32–7.43)
PH BLDV: 7.46 [PH] (ref 7.32–7.43)
PH BLDV: 7.48 [PH] (ref 7.32–7.43)
PH BLDV: 7.49 [PH] (ref 7.32–7.43)
PH UR STRIP: 7.5 [PH] (ref 5–7)
PHOSPHATE SERPL-MCNC: 2.8 MG/DL (ref 2.5–4.5)
PHOSPHATE SERPL-MCNC: 6.7 MG/DL (ref 2.5–4.5)
PLATELET # BLD AUTO: 112 10E3/UL (ref 150–450)
PLATELET # BLD AUTO: 93 10E3/UL (ref 150–450)
PO2 BLD: 112 MM HG (ref 80–105)
PO2 BLD: 118 MM HG (ref 80–105)
PO2 BLD: 149 MM HG (ref 80–105)
PO2 BLD: 85 MM HG (ref 80–105)
PO2 BLD: 90 MM HG (ref 80–105)
PO2 BLD: 94 MM HG (ref 80–105)
PO2 BLDV: 33 MM HG (ref 25–47)
PO2 BLDV: 33 MM HG (ref 25–47)
PO2 BLDV: 34 MM HG (ref 25–47)
PO2 BLDV: 35 MM HG (ref 25–47)
PO2 BLDV: 36 MM HG (ref 25–47)
PO2 BLDV: 36 MM HG (ref 25–47)
PO2 BLDV: 45 MM HG (ref 25–47)
POTASSIUM BLD-SCNC: 3.5 MMOL/L (ref 3.4–5.3)
POTASSIUM BLD-SCNC: 3.6 MMOL/L (ref 3.4–5.3)
POTASSIUM BLD-SCNC: 3.9 MMOL/L (ref 3.4–5.3)
POTASSIUM BLD-SCNC: 4.6 MMOL/L (ref 3.4–5.3)
PROT SERPL-MCNC: 5.1 G/DL (ref 6.8–8.8)
PROT SERPL-MCNC: 5.3 G/DL (ref 6.8–8.8)
PROT SERPL-MCNC: 5.5 G/DL (ref 6.8–8.8)
PROT SERPL-MCNC: 5.6 G/DL (ref 6.8–8.8)
RBC # BLD AUTO: 2.23 10E6/UL (ref 4.4–5.9)
RBC # BLD AUTO: 2.96 10E6/UL (ref 4.4–5.9)
RBC URINE: 1 /HPF
SODIUM SERPL-SCNC: 143 MMOL/L (ref 133–144)
SODIUM SERPL-SCNC: 146 MMOL/L (ref 133–144)
SODIUM SERPL-SCNC: 147 MMOL/L (ref 133–144)
SODIUM SERPL-SCNC: 147 MMOL/L (ref 133–144)
SP GR UR STRIP: 1 (ref 1–1.03)
TACROLIMUS BLD-MCNC: 13.5 UG/L (ref 5–15)
TME LAST DOSE: NORMAL H
TME LAST DOSE: NORMAL H
TRANSITIONAL EPI: <1 /HPF
TRIGL SERPL-MCNC: 307 MG/DL
TROPONIN I SERPL-MCNC: 0.81 UG/L (ref 0–0.04)
UNIT ABO/RH: NORMAL
UNIT ABO/RH: NORMAL
UNIT NUMBER: NORMAL
UNIT NUMBER: NORMAL
UNIT STATUS: NORMAL
UNIT STATUS: NORMAL
UNIT TYPE ISBT: 6200
UNIT TYPE ISBT: 6200
UROBILINOGEN UR STRIP-MCNC: NORMAL MG/DL
WBC # BLD AUTO: 18.7 10E3/UL (ref 4–11)
WBC # BLD AUTO: 20.7 10E3/UL (ref 4–11)
WBC URINE: 3 /HPF

## 2021-10-22 PROCEDURE — 84155 ASSAY OF PROTEIN SERUM: CPT | Performed by: INTERNAL MEDICINE

## 2021-10-22 PROCEDURE — 70496 CT ANGIOGRAPHY HEAD: CPT | Mod: 26 | Performed by: STUDENT IN AN ORGANIZED HEALTH CARE EDUCATION/TRAINING PROGRAM

## 2021-10-22 PROCEDURE — 250N000011 HC RX IP 250 OP 636: Performed by: NURSE PRACTITIONER

## 2021-10-22 PROCEDURE — 84478 ASSAY OF TRIGLYCERIDES: CPT | Performed by: THORACIC SURGERY (CARDIOTHORACIC VASCULAR SURGERY)

## 2021-10-22 PROCEDURE — 250N000013 HC RX MED GY IP 250 OP 250 PS 637: Performed by: ANESTHESIOLOGY

## 2021-10-22 PROCEDURE — P9016 RBC LEUKOCYTES REDUCED: HCPCS | Performed by: SURGERY

## 2021-10-22 PROCEDURE — 99291 CRITICAL CARE FIRST HOUR: CPT | Mod: 24 | Performed by: ANESTHESIOLOGY

## 2021-10-22 PROCEDURE — 74177 CT ABD & PELVIS W/CONTRAST: CPT

## 2021-10-22 PROCEDURE — 0042T CT HEAD PERFUSION WITH CONTRAST: CPT | Mod: 26 | Performed by: STUDENT IN AN ORGANIZED HEALTH CARE EDUCATION/TRAINING PROGRAM

## 2021-10-22 PROCEDURE — 84484 ASSAY OF TROPONIN QUANT: CPT | Performed by: ANESTHESIOLOGY

## 2021-10-22 PROCEDURE — 250N000009 HC RX 250: Performed by: ANESTHESIOLOGY

## 2021-10-22 PROCEDURE — 85018 HEMOGLOBIN: CPT | Performed by: STUDENT IN AN ORGANIZED HEALTH CARE EDUCATION/TRAINING PROGRAM

## 2021-10-22 PROCEDURE — 82805 BLOOD GASES W/O2 SATURATION: CPT | Performed by: INTERNAL MEDICINE

## 2021-10-22 PROCEDURE — 74177 CT ABD & PELVIS W/CONTRAST: CPT | Mod: 26 | Performed by: RADIOLOGY

## 2021-10-22 PROCEDURE — 70498 CT ANGIOGRAPHY NECK: CPT | Mod: 26 | Performed by: STUDENT IN AN ORGANIZED HEALTH CARE EDUCATION/TRAINING PROGRAM

## 2021-10-22 PROCEDURE — 250N000011 HC RX IP 250 OP 636: Performed by: ANESTHESIOLOGY

## 2021-10-22 PROCEDURE — 82803 BLOOD GASES ANY COMBINATION: CPT | Performed by: INTERNAL MEDICINE

## 2021-10-22 PROCEDURE — 250N000013 HC RX MED GY IP 250 OP 250 PS 637: Performed by: STUDENT IN AN ORGANIZED HEALTH CARE EDUCATION/TRAINING PROGRAM

## 2021-10-22 PROCEDURE — 85610 PROTHROMBIN TIME: CPT | Performed by: INTERNAL MEDICINE

## 2021-10-22 PROCEDURE — 999N000045 HC STATISTIC DAILY SWAN MONITORING

## 2021-10-22 PROCEDURE — 83605 ASSAY OF LACTIC ACID: CPT | Performed by: ANESTHESIOLOGY

## 2021-10-22 PROCEDURE — 258N000003 HC RX IP 258 OP 636: Performed by: STUDENT IN AN ORGANIZED HEALTH CARE EDUCATION/TRAINING PROGRAM

## 2021-10-22 PROCEDURE — 250N000011 HC RX IP 250 OP 636

## 2021-10-22 PROCEDURE — 85396 CLOTTING ASSAY WHOLE BLOOD: CPT | Performed by: STUDENT IN AN ORGANIZED HEALTH CARE EDUCATION/TRAINING PROGRAM

## 2021-10-22 PROCEDURE — 85384 FIBRINOGEN ACTIVITY: CPT | Performed by: STUDENT IN AN ORGANIZED HEALTH CARE EDUCATION/TRAINING PROGRAM

## 2021-10-22 PROCEDURE — 258N000003 HC RX IP 258 OP 636: Performed by: ANESTHESIOLOGY

## 2021-10-22 PROCEDURE — 71250 CT THORAX DX C-: CPT

## 2021-10-22 PROCEDURE — 80069 RENAL FUNCTION PANEL: CPT | Performed by: INTERNAL MEDICINE

## 2021-10-22 PROCEDURE — 250N000013 HC RX MED GY IP 250 OP 250 PS 637: Performed by: PHYSICIAN ASSISTANT

## 2021-10-22 PROCEDURE — 84100 ASSAY OF PHOSPHORUS: CPT | Performed by: INTERNAL MEDICINE

## 2021-10-22 PROCEDURE — 72125 CT NECK SPINE W/O DYE: CPT

## 2021-10-22 PROCEDURE — 250N000012 HC RX MED GY IP 250 OP 636 PS 637: Performed by: INTERNAL MEDICINE

## 2021-10-22 PROCEDURE — 82550 ASSAY OF CK (CPK): CPT | Performed by: STUDENT IN AN ORGANIZED HEALTH CARE EDUCATION/TRAINING PROGRAM

## 2021-10-22 PROCEDURE — 94640 AIRWAY INHALATION TREATMENT: CPT

## 2021-10-22 PROCEDURE — 70496 CT ANGIOGRAPHY HEAD: CPT

## 2021-10-22 PROCEDURE — 999N000175 HC STATISTIC STROKE CODE W/ ACCESS

## 2021-10-22 PROCEDURE — 82140 ASSAY OF AMMONIA: CPT | Performed by: THORACIC SURGERY (CARDIOTHORACIC VASCULAR SURGERY)

## 2021-10-22 PROCEDURE — 250N000012 HC RX MED GY IP 250 OP 636 PS 637: Performed by: NURSE PRACTITIONER

## 2021-10-22 PROCEDURE — 86923 COMPATIBILITY TEST ELECTRIC: CPT | Performed by: SURGERY

## 2021-10-22 PROCEDURE — 99291 CRITICAL CARE FIRST HOUR: CPT | Mod: 25 | Performed by: PSYCHIATRY & NEUROLOGY

## 2021-10-22 PROCEDURE — 250N000012 HC RX MED GY IP 250 OP 636 PS 637: Performed by: PHYSICIAN ASSISTANT

## 2021-10-22 PROCEDURE — 250N000011 HC RX IP 250 OP 636: Performed by: THORACIC SURGERY (CARDIOTHORACIC VASCULAR SURGERY)

## 2021-10-22 PROCEDURE — 93010 ELECTROCARDIOGRAM REPORT: CPT | Performed by: INTERNAL MEDICINE

## 2021-10-22 PROCEDURE — 99233 SBSQ HOSP IP/OBS HIGH 50: CPT | Mod: 24 | Performed by: INTERNAL MEDICINE

## 2021-10-22 PROCEDURE — 258N000003 HC RX IP 258 OP 636: Performed by: THORACIC SURGERY (CARDIOTHORACIC VASCULAR SURGERY)

## 2021-10-22 PROCEDURE — 250N000013 HC RX MED GY IP 250 OP 250 PS 637: Performed by: NURSE PRACTITIONER

## 2021-10-22 PROCEDURE — 250N000009 HC RX 250: Performed by: NURSE PRACTITIONER

## 2021-10-22 PROCEDURE — 83735 ASSAY OF MAGNESIUM: CPT | Performed by: INTERNAL MEDICINE

## 2021-10-22 PROCEDURE — 999N000157 HC STATISTIC RCP TIME EA 10 MIN

## 2021-10-22 PROCEDURE — 85048 AUTOMATED LEUKOCYTE COUNT: CPT | Performed by: STUDENT IN AN ORGANIZED HEALTH CARE EDUCATION/TRAINING PROGRAM

## 2021-10-22 PROCEDURE — 94799 UNLISTED PULMONARY SVC/PX: CPT

## 2021-10-22 PROCEDURE — 94003 VENT MGMT INPAT SUBQ DAY: CPT

## 2021-10-22 PROCEDURE — 84075 ASSAY ALKALINE PHOSPHATASE: CPT | Performed by: INTERNAL MEDICINE

## 2021-10-22 PROCEDURE — 71045 X-RAY EXAM CHEST 1 VIEW: CPT

## 2021-10-22 PROCEDURE — 71250 CT THORAX DX C-: CPT | Mod: 26 | Performed by: RADIOLOGY

## 2021-10-22 PROCEDURE — 85730 THROMBOPLASTIN TIME PARTIAL: CPT | Performed by: STUDENT IN AN ORGANIZED HEALTH CARE EDUCATION/TRAINING PROGRAM

## 2021-10-22 PROCEDURE — 999N000015 HC STATISTIC ARTERIAL MONITORING DAILY

## 2021-10-22 PROCEDURE — 82248 BILIRUBIN DIRECT: CPT | Performed by: INTERNAL MEDICINE

## 2021-10-22 PROCEDURE — 72126 CT NECK SPINE W/DYE: CPT

## 2021-10-22 PROCEDURE — 95720 EEG PHY/QHP EA INCR W/VEEG: CPT | Performed by: PSYCHIATRY & NEUROLOGY

## 2021-10-22 PROCEDURE — 80197 ASSAY OF TACROLIMUS: CPT | Performed by: NURSE PRACTITIONER

## 2021-10-22 PROCEDURE — 71275 CT ANGIOGRAPHY CHEST: CPT

## 2021-10-22 PROCEDURE — 250N000011 HC RX IP 250 OP 636: Performed by: STUDENT IN AN ORGANIZED HEALTH CARE EDUCATION/TRAINING PROGRAM

## 2021-10-22 PROCEDURE — 83010 ASSAY OF HAPTOGLOBIN QUANT: CPT | Performed by: STUDENT IN AN ORGANIZED HEALTH CARE EDUCATION/TRAINING PROGRAM

## 2021-10-22 PROCEDURE — 999N000155 HC STATISTIC RAPCV CVP MONITORING

## 2021-10-22 PROCEDURE — 70450 CT HEAD/BRAIN W/O DYE: CPT

## 2021-10-22 PROCEDURE — 0042T CT HEAD PERFUSION WITH CONTRAST: CPT

## 2021-10-22 PROCEDURE — 71045 X-RAY EXAM CHEST 1 VIEW: CPT | Mod: 26 | Performed by: RADIOLOGY

## 2021-10-22 PROCEDURE — 80048 BASIC METABOLIC PNL TOTAL CA: CPT | Performed by: INTERNAL MEDICINE

## 2021-10-22 PROCEDURE — C9113 INJ PANTOPRAZOLE SODIUM, VIA: HCPCS | Performed by: ANESTHESIOLOGY

## 2021-10-22 PROCEDURE — 81001 URINALYSIS AUTO W/SCOPE: CPT | Performed by: STUDENT IN AN ORGANIZED HEALTH CARE EDUCATION/TRAINING PROGRAM

## 2021-10-22 PROCEDURE — 94640 AIRWAY INHALATION TREATMENT: CPT | Mod: 76

## 2021-10-22 PROCEDURE — 85027 COMPLETE CBC AUTOMATED: CPT | Performed by: STUDENT IN AN ORGANIZED HEALTH CARE EDUCATION/TRAINING PROGRAM

## 2021-10-22 PROCEDURE — 71275 CT ANGIOGRAPHY CHEST: CPT | Mod: 26 | Performed by: RADIOLOGY

## 2021-10-22 PROCEDURE — 250N000009 HC RX 250: Performed by: THORACIC SURGERY (CARDIOTHORACIC VASCULAR SURGERY)

## 2021-10-22 PROCEDURE — 87106 FUNGI IDENTIFICATION YEAST: CPT | Performed by: THORACIC SURGERY (CARDIOTHORACIC VASCULAR SURGERY)

## 2021-10-22 PROCEDURE — 250N000009 HC RX 250: Performed by: STUDENT IN AN ORGANIZED HEALTH CARE EDUCATION/TRAINING PROGRAM

## 2021-10-22 PROCEDURE — 200N000002 HC R&B ICU UMMC

## 2021-10-22 PROCEDURE — 36415 COLL VENOUS BLD VENIPUNCTURE: CPT | Performed by: THORACIC SURGERY (CARDIOTHORACIC VASCULAR SURGERY)

## 2021-10-22 PROCEDURE — 99222 1ST HOSP IP/OBS MODERATE 55: CPT | Performed by: NURSE PRACTITIONER

## 2021-10-22 PROCEDURE — 71260 CT THORAX DX C+: CPT

## 2021-10-22 PROCEDURE — 72126 CT NECK SPINE W/DYE: CPT | Mod: 26 | Performed by: STUDENT IN AN ORGANIZED HEALTH CARE EDUCATION/TRAINING PROGRAM

## 2021-10-22 PROCEDURE — 999N000128 HC STATISTIC PERIPHERAL IV START W/O US GUIDANCE

## 2021-10-22 PROCEDURE — 87106 FUNGI IDENTIFICATION YEAST: CPT | Performed by: STUDENT IN AN ORGANIZED HEALTH CARE EDUCATION/TRAINING PROGRAM

## 2021-10-22 PROCEDURE — 95714 VEEG EA 12-26 HR UNMNTR: CPT

## 2021-10-22 PROCEDURE — 84450 TRANSFERASE (AST) (SGOT): CPT | Performed by: INTERNAL MEDICINE

## 2021-10-22 PROCEDURE — 94668 MNPJ CHEST WALL SBSQ: CPT

## 2021-10-22 PROCEDURE — C9113 INJ PANTOPRAZOLE SODIUM, VIA: HCPCS | Performed by: NURSE PRACTITIONER

## 2021-10-22 PROCEDURE — 93005 ELECTROCARDIOGRAM TRACING: CPT

## 2021-10-22 PROCEDURE — 82040 ASSAY OF SERUM ALBUMIN: CPT | Performed by: INTERNAL MEDICINE

## 2021-10-22 RX ORDER — HYDRALAZINE HYDROCHLORIDE 20 MG/ML
10 INJECTION INTRAMUSCULAR; INTRAVENOUS EVERY 6 HOURS PRN
Status: DISCONTINUED | OUTPATIENT
Start: 2021-10-22 | End: 2021-10-22

## 2021-10-22 RX ORDER — IOPAMIDOL 755 MG/ML
100 INJECTION, SOLUTION INTRAVASCULAR ONCE
Status: COMPLETED | OUTPATIENT
Start: 2021-10-22 | End: 2021-10-22

## 2021-10-22 RX ORDER — HYDRALAZINE HYDROCHLORIDE 20 MG/ML
10 INJECTION INTRAMUSCULAR; INTRAVENOUS EVERY 6 HOURS
Status: DISCONTINUED | OUTPATIENT
Start: 2021-10-22 | End: 2021-10-22

## 2021-10-22 RX ORDER — IOPAMIDOL 755 MG/ML
115 INJECTION, SOLUTION INTRAVASCULAR ONCE
Status: COMPLETED | OUTPATIENT
Start: 2021-10-22 | End: 2021-10-22

## 2021-10-22 RX ORDER — ATORVASTATIN CALCIUM 80 MG/1
80 TABLET, FILM COATED ORAL EVERY EVENING
Status: DISCONTINUED | OUTPATIENT
Start: 2021-10-22 | End: 2021-10-23

## 2021-10-22 RX ORDER — CEFTAZIDIME 2 G/1
2 INJECTION, POWDER, FOR SOLUTION INTRAVENOUS EVERY 12 HOURS
Status: DISCONTINUED | OUTPATIENT
Start: 2021-10-22 | End: 2021-10-23

## 2021-10-22 RX ORDER — ACETAZOLAMIDE 250 MG/1
500 TABLET ORAL 3 TIMES DAILY
Status: DISCONTINUED | OUTPATIENT
Start: 2021-10-22 | End: 2021-10-23

## 2021-10-22 RX ORDER — AMLODIPINE BESYLATE 10 MG/1
10 TABLET ORAL DAILY
Status: DISCONTINUED | OUTPATIENT
Start: 2021-10-22 | End: 2021-11-02

## 2021-10-22 RX ORDER — HYDRALAZINE HYDROCHLORIDE 25 MG/1
25 TABLET, FILM COATED ORAL EVERY 8 HOURS SCHEDULED
Status: DISCONTINUED | OUTPATIENT
Start: 2021-10-22 | End: 2021-10-23

## 2021-10-22 RX ORDER — POTASSIUM CHLORIDE 29.8 MG/ML
20 INJECTION INTRAVENOUS ONCE
Status: COMPLETED | OUTPATIENT
Start: 2021-10-22 | End: 2021-10-22

## 2021-10-22 RX ORDER — ESMOLOL HYDROCHLORIDE 20 MG/ML
50-300 INJECTION, SOLUTION INTRAVENOUS CONTINUOUS
Status: DISCONTINUED | OUTPATIENT
Start: 2021-10-22 | End: 2021-10-26

## 2021-10-22 RX ADMIN — LEVALBUTEROL HYDROCHLORIDE 1.25 MG: 1.25 SOLUTION RESPIRATORY (INHALATION) at 09:52

## 2021-10-22 RX ADMIN — CEFTAZIDIME 2 G: 2 INJECTION, POWDER, FOR SOLUTION INTRAVENOUS at 21:33

## 2021-10-22 RX ADMIN — MULTIVIT AND MINERALS-FERROUS GLUCONATE 9 MG IRON/15 ML ORAL LIQUID 15 ML: at 09:39

## 2021-10-22 RX ADMIN — ACETAMINOPHEN 650 MG: 325 TABLET, FILM COATED ORAL at 15:28

## 2021-10-22 RX ADMIN — CEFTAZIDIME 2 G: 2 INJECTION, POWDER, FOR SOLUTION INTRAVENOUS at 01:38

## 2021-10-22 RX ADMIN — MYCOPHENOLATE MOFETIL 1500 MG: 200 POWDER, FOR SUSPENSION ORAL at 09:55

## 2021-10-22 RX ADMIN — LEVALBUTEROL HYDROCHLORIDE 1.25 MG: 1.25 SOLUTION RESPIRATORY (INHALATION) at 12:05

## 2021-10-22 RX ADMIN — LEVOTHYROXINE SODIUM 25 MCG: 0.03 TABLET ORAL at 09:38

## 2021-10-22 RX ADMIN — ACYCLOVIR 400 MG: 200 SUSPENSION ORAL at 09:53

## 2021-10-22 RX ADMIN — HEPARIN SODIUM 5000 UNITS: 10000 INJECTION, SOLUTION INTRAVENOUS; SUBCUTANEOUS at 12:23

## 2021-10-22 RX ADMIN — ESMOLOL HYDROCHLORIDE 50 MCG/KG/MIN: 20 INJECTION INTRAVENOUS at 09:34

## 2021-10-22 RX ADMIN — HUMAN INSULIN 8 UNITS/HR: 100 INJECTION, SOLUTION SUBCUTANEOUS at 03:31

## 2021-10-22 RX ADMIN — CEFTAZIDIME 2 G: 2 INJECTION, POWDER, FOR SOLUTION INTRAVENOUS at 10:11

## 2021-10-22 RX ADMIN — TACROLIMUS 4 MG: 5 CAPSULE ORAL at 18:16

## 2021-10-22 RX ADMIN — MYCOPHENOLATE MOFETIL 1500 MG: 200 POWDER, FOR SUSPENSION ORAL at 21:13

## 2021-10-22 RX ADMIN — ATORVASTATIN CALCIUM 80 MG: 80 TABLET, FILM COATED ORAL at 21:11

## 2021-10-22 RX ADMIN — AMLODIPINE BESYLATE 10 MG: 10 TABLET ORAL at 16:56

## 2021-10-22 RX ADMIN — NYSTATIN 1000000 UNITS: 500000 SUSPENSION ORAL at 21:11

## 2021-10-22 RX ADMIN — POTASSIUM CHLORIDE 20 MEQ: 29.8 INJECTION, SOLUTION INTRAVENOUS at 16:56

## 2021-10-22 RX ADMIN — QUETIAPINE FUMARATE 25 MG: 25 TABLET ORAL at 00:36

## 2021-10-22 RX ADMIN — NYSTATIN 1000000 UNITS: 500000 SUSPENSION ORAL at 15:18

## 2021-10-22 RX ADMIN — LEVALBUTEROL HYDROCHLORIDE 1.25 MG: 1.25 SOLUTION RESPIRATORY (INHALATION) at 20:43

## 2021-10-22 RX ADMIN — ACETYLCYSTEINE 2 ML: 200 SOLUTION ORAL; RESPIRATORY (INHALATION) at 09:52

## 2021-10-22 RX ADMIN — PANTOPRAZOLE SODIUM 40 MG: 40 INJECTION, POWDER, FOR SOLUTION INTRAVENOUS at 09:39

## 2021-10-22 RX ADMIN — HEPARIN SODIUM 5000 UNITS: 10000 INJECTION, SOLUTION INTRAVENOUS; SUBCUTANEOUS at 04:22

## 2021-10-22 RX ADMIN — HYDRALAZINE HYDROCHLORIDE 25 MG: 25 TABLET, FILM COATED ORAL at 21:11

## 2021-10-22 RX ADMIN — NYSTATIN 1000000 UNITS: 500000 SUSPENSION ORAL at 09:39

## 2021-10-22 RX ADMIN — MICAFUNGIN SODIUM 100 MG: 50 INJECTION, POWDER, LYOPHILIZED, FOR SOLUTION INTRAVENOUS at 11:00

## 2021-10-22 RX ADMIN — ACETYLCYSTEINE 2 ML: 200 SOLUTION ORAL; RESPIRATORY (INHALATION) at 16:08

## 2021-10-22 RX ADMIN — POTASSIUM CHLORIDE 20 MEQ: 29.8 INJECTION, SOLUTION INTRAVENOUS at 12:17

## 2021-10-22 RX ADMIN — ACETYLCYSTEINE 2 ML: 200 SOLUTION ORAL; RESPIRATORY (INHALATION) at 12:05

## 2021-10-22 RX ADMIN — ACETAZOLAMIDE 500 MG: 250 TABLET ORAL at 21:15

## 2021-10-22 RX ADMIN — HUMAN INSULIN 6 UNITS/HR: 100 INJECTION, SOLUTION SUBCUTANEOUS at 14:36

## 2021-10-22 RX ADMIN — SODIUM NITROPRUSSIDE 2 MCG/KG/MIN: 25 INJECTION, SOLUTION, CONCENTRATE INTRAVENOUS at 06:31

## 2021-10-22 RX ADMIN — POLYETHYLENE GLYCOL 3350 17 G: 17 POWDER, FOR SOLUTION ORAL at 21:12

## 2021-10-22 RX ADMIN — TACROLIMUS 5 MG: 5 CAPSULE ORAL at 10:00

## 2021-10-22 RX ADMIN — NICARDIPINE HYDROCHLORIDE 2.5 MG/HR: 2.5 INJECTION INTRAVENOUS at 12:12

## 2021-10-22 RX ADMIN — IOPAMIDOL 115 ML: 755 INJECTION, SOLUTION INTRAVENOUS at 08:18

## 2021-10-22 RX ADMIN — ESMOLOL HYDROCHLORIDE 100 MCG/KG/MIN: 20 INJECTION INTRAVENOUS at 23:26

## 2021-10-22 RX ADMIN — AMIODARONE HYDROCHLORIDE 400 MG: 200 TABLET ORAL at 09:37

## 2021-10-22 RX ADMIN — HEPARIN SODIUM 5000 UNITS: 10000 INJECTION, SOLUTION INTRAVENOUS; SUBCUTANEOUS at 21:11

## 2021-10-22 RX ADMIN — AMIODARONE HYDROCHLORIDE 400 MG: 200 TABLET ORAL at 21:11

## 2021-10-22 RX ADMIN — NICARDIPINE HYDROCHLORIDE 15 MG/HR: 2.5 INJECTION INTRAVENOUS at 19:29

## 2021-10-22 RX ADMIN — PREDNISOLONE 17.5 MG: 15 SOLUTION ORAL at 21:14

## 2021-10-22 RX ADMIN — IOPAMIDOL 100 ML: 755 INJECTION, SOLUTION INTRAVENOUS at 12:50

## 2021-10-22 RX ADMIN — LEVALBUTEROL HYDROCHLORIDE 1.25 MG: 1.25 SOLUTION RESPIRATORY (INHALATION) at 16:08

## 2021-10-22 RX ADMIN — PANTOPRAZOLE SODIUM 40 MG: 40 INJECTION, POWDER, FOR SOLUTION INTRAVENOUS at 16:30

## 2021-10-22 RX ADMIN — Medication 1 PACKET: at 21:14

## 2021-10-22 RX ADMIN — SODIUM NITROPRUSSIDE 2.75 MCG/KG/MIN: 25 INJECTION, SOLUTION, CONCENTRATE INTRAVENOUS at 00:42

## 2021-10-22 RX ADMIN — POTASSIUM CHLORIDE 20 MEQ: 40 SOLUTION ORAL at 09:55

## 2021-10-22 RX ADMIN — ESMOLOL HYDROCHLORIDE 150 MCG/KG/MIN: 20 INJECTION INTRAVENOUS at 15:06

## 2021-10-22 RX ADMIN — NYSTATIN 1000000 UNITS: 500000 SUSPENSION ORAL at 13:38

## 2021-10-22 RX ADMIN — HYDRALAZINE HYDROCHLORIDE 25 MG: 25 TABLET, FILM COATED ORAL at 16:56

## 2021-10-22 RX ADMIN — ACYCLOVIR 400 MG: 200 SUSPENSION ORAL at 21:13

## 2021-10-22 RX ADMIN — DOCUSATE SODIUM 50 MG AND SENNOSIDES 8.6 MG 1 TABLET: 8.6; 5 TABLET, FILM COATED ORAL at 21:12

## 2021-10-22 RX ADMIN — Medication 50 MCG/HR: at 16:14

## 2021-10-22 RX ADMIN — DEXMEDETOMIDINE 0.8 MCG/KG/HR: 100 INJECTION, SOLUTION, CONCENTRATE INTRAVENOUS at 03:35

## 2021-10-22 RX ADMIN — Medication 1 PACKET: at 09:45

## 2021-10-22 RX ADMIN — PREDNISOLONE 17.5 MG: 15 SOLUTION ORAL at 09:55

## 2021-10-22 RX ADMIN — ESMOLOL HYDROCHLORIDE 100 MCG/KG/MIN: 20 INJECTION INTRAVENOUS at 19:11

## 2021-10-22 RX ADMIN — ACETYLCYSTEINE 2 ML: 200 SOLUTION ORAL; RESPIRATORY (INHALATION) at 20:43

## 2021-10-22 RX ADMIN — DOCUSATE SODIUM 50 MG AND SENNOSIDES 8.6 MG 1 TABLET: 8.6; 5 TABLET, FILM COATED ORAL at 09:39

## 2021-10-22 ASSESSMENT — ACTIVITIES OF DAILY LIVING (ADL)
ADLS_ACUITY_SCORE: 18
ADLS_ACUITY_SCORE: 20
ADLS_ACUITY_SCORE: 20
ADLS_ACUITY_SCORE: 18
ADLS_ACUITY_SCORE: 20
ADLS_ACUITY_SCORE: 20
ADLS_ACUITY_SCORE: 18
ADLS_ACUITY_SCORE: 20
ADLS_ACUITY_SCORE: 18
ADLS_ACUITY_SCORE: 20
ADLS_ACUITY_SCORE: 18

## 2021-10-22 ASSESSMENT — MIFFLIN-ST. JEOR: SCORE: 1489

## 2021-10-22 NOTE — PROVIDER NOTIFICATION
CVTS notified that pt not moving any extremities @ 22:40. Opens eyes on voice only. Sedation turned off

## 2021-10-22 NOTE — PROGRESS NOTES
EEG CLINICAL NEUROPHYSIOLOGY PRELIMINARY REPORT    First 2 hours of video EEG reviewed.  Sedative drips had been stopped about 4 hours prior to initiation of this study.  Moderate amplitude delta predominates.  Occasional runs of triphasic activity but persistence generally less than 20%.  No epileptiform discharges or electrographic seizures.  No clear focal findings.    Study consistent with a moderate to severe diffuse encephalopathy.  Thus far, no epileptiform discharges or seizures.  No indication of nonconvulsive status epilepticus.  Will continue video EEG monitoring.  Full report to follow.    Giovany Greenfield MD  Contact information for physicians covering Epilepsy and EEG is available on [facility].  Click search, enter neurology adult/[facility] in group name box, click on neurology adult/[facility], then click Staff Epilepsy and EEG.

## 2021-10-22 NOTE — CONSULTS
[facility]    Stroke Consult Note    Reason for Consult: Stroke Code     Chief Complaint: No chief complaint on file.      HPI  Edson Thornton is a 56 year old male with past medical history significant for HTN, HLD, RA, SALTY, and ILD now s/p lung transplant 10/16/21. He was noted to be in afib with RVR on 10/18. Per primary team, he has had significant sedation requirements since his transplant. While sedation is held on previous days, he was noted to be agitated and moving all extremities. Around 2200 yesterday, sedation was stopped and he was noted to not be moving any extremities, only opening his eyes. Code stroke was called this morning for persistent encephalopathy and lack of extremity movement.     On exam, he opens eyes to voice and noxious. No extremity movement to deep noxious stimulation but does have sustained ankle clonus in BLE.    Imaging Findings  CTH with recent appearing infarcts in bilateral cerebral hemispheres and th left cerebellum  CTA head/neck without significant stenosis or LVO    Thrombolytic Treatment   Not given due to surgery/trauma within the past 14 days.    Endovascular Treatment  Not initiated due to absence of proximal vessel occlusion    Impression   1. Acute ischemic stroke of bilateral cerebral hemispheres and left cerebellum due to cardioembolism  2. Encephalopathy and diffuse weakness with sustained ankle clonus - Multifocal ischemic strokes may be contributing to an embolic encephalopathy, however, the degree of his deficits would not be fully explained by the infarcts demonstrated on CTH. Further evaluation is indicated    Recommendations  - CT cspine w/o  - 2 hour video EEG  - MRI brain when clinically stable  - Q4 hour neurochecks  - Given recent afib and embolic appearing infarcts, recommend anticoagulation when cleared from a surgical standpoint  - Repeat LDL, high intensity statin to achieve goal LDL <100  - A1c 6.6,  "within goal <7.0  - Goal BP <140/90 with tighter control associated with decreased overall CV risk, if tolerated    Patient Follow-up    - final recommendation pending work-up    Thank you for this consult. We will continue to follow.     ILIR German, CNP  Neurology  To page me or covering stroke neurology team member, click here: AMCOM   Choose \"On Call\" tab at top, then search dropdown box for \"Neurology Adult\", select location, press Enter, then look for stroke/neuro ICU/telestroke.    ______________________________________________________    Clinically Significant Risk Factors Present on Admission                  Past Medical History   Past Medical History:   Diagnosis Date     BRENNAN (acute kidney injury) (H) 10/17/2021     Anxiety      Depression      HLD (hyperlipidemia)      HTN (hypertension)      Hypothyroidism      ILD (interstitial lung disease) (H)      SALTY on CPAP      Oxygen dependent     BL 4L since ~6/2021     Rheumatoid arthritis (H)     signs ~5/2020, dx 5/2021     S/P lung transplant (H) 10/16/2021     Shock liver 10/17/2021     Steroid-induced hyperglycemia      Traction bronchiectasis (H)      Past Surgical History   Past Surgical History:   Procedure Laterality Date     COLONOSCOPY W/ BIOPSIES AND POLYPECTOMY  07/21/2020     CV CORONARY ANGIOGRAM N/A 9/8/2021    Procedure: Coronary Angiogram with possible intervention;  Surgeon: Jovon Bullock MD;  Location:  HEART CARDIAC CATH LAB     CV RIGHT HEART CATH MEASUREMENTS RECORDED N/A 9/8/2021    Procedure: Right Heart Cath;  Surgeon: Jovon Bullock MD;  Location:  HEART CARDIAC CATH LAB     EXTRACTION(S) DENTAL N/A 9/22/2021    Procedure: EXTRACTION tooth #19;  Surgeon: Deepak Tobin DDS;  Location: [facility]     HERNIA REPAIR       right acl       TRANSPLANT LUNG RECIPIENT SINGLE X2 Bilateral 10/16/2021    Procedure: TRANSPLANT, LUNG, RECIPIENT, BILATERAL, Bronchoscopy, on-pump perfusion, bilateral clamshell sternotomy;  " Surgeon: Yanick Corral MD;  Location: [facility] OR     XR ACROMIOCLAVICULAR JOINT BILATERAL       Medications   Home Meds  Prior to Admission medications    Medication Sig Start Date End Date Taking? Authorizing Provider   acetaminophen (TYLENOL) 325 MG tablet Take 325 mg by mouth every 6 hours as needed for mild pain   Yes Unknown, Entered By History   amLODIPine (NORVASC) 5 MG tablet Take 5 mg by mouth daily   Yes Unknown, Entered By History   escitalopram (LEXAPRO) 5 MG tablet Take 5 mg by mouth daily   Yes Unknown, Entered By History   fluticasone-vilanterol (BREO ELLIPTA) 200-25 MCG/INH inhaler Inhale 1 puff into the lungs daily   Yes Unknown, Entered By History   insulin aspart (NOVOLOG FLEXPEN) 100 UNIT/ML pen Inject 3-11 Units Subcutaneous With meals and at bedtime.   Yes Unknown, Entered By History   levothyroxine (SYNTHROID/LEVOTHROID) 25 MCG tablet Take 25 mcg by mouth daily   Yes Unknown, Entered By History   omeprazole (PRILOSEC) 40 MG DR capsule Take 40 mg by mouth 2 times daily (before meals)   Yes Unknown, Entered By History   potassium chloride ER (KLOR-CON M) 20 MEQ CR tablet Take 20 mEq by mouth daily   Yes Unknown, Entered By History   sulfamethoxazole-trimethoprim (BACTRIM DS) 800-160 MG tablet Take 1 tablet by mouth Every Mon, Wed, Fri Morning    Yes Unknown, Entered By History       Scheduled Meds    acetylcysteine  2 mL Nebulization 4x Daily     acyclovir  400 mg Oral or NG Tube BID     amiodarone  400 mg Oral BID     [START ON 10/24/2021] calcium carbonate 600 mg-vitamin D 400 units  1 tablet Oral or Feeding Tube BID w/meals     cefTAZidime  2 g Intravenous Q8H     [Held by provider] escitalopram  5 mg Oral or Feeding Tube Daily     [Held by provider] gabapentin  100 mg Oral or Feeding Tube TID     heparin ANTICOAGULANT  5,000 Units Subcutaneous Q8H     levalbuterol  1.25 mg Nebulization 4x Daily     levothyroxine  25 mcg Oral Daily     [Held by provider] melatonin  5 mg Oral or Feeding  Tube QPM     micafungin  100 mg Intravenous Q24H     multivitamins w/minerals  15 mL Per Feeding Tube Daily     mycophenolate  1,500 mg Oral or NG Tube BID     nystatin  1,000,000 Units Swish & Swallow 4x Daily     pantoprazole  40 mg Per Feeding Tube BID    Or     pantoprazole (PROTONIX) IV  40 mg Intravenous BID     polyethylene glycol  17 g Oral or Feeding Tube BID     potassium chloride  20 mEq Oral Daily     prednisoLONE  17.5 mg Oral or NG Tube BID     [START ON 10/24/2021] predniSONE  15 mg Per Feeding Tube BID     protein modular  1 packet Per Feeding Tube BID     senna-docusate  1 tablet Oral or Feeding Tube BID     sodium chloride (PF)  3 mL Intracatheter Q8H     [Held by provider] sulfamethoxazole-trimethoprim  10 mL Oral or NG Tube Daily    Or     [Held by provider] sulfamethoxazole-trimethoprim  1 tablet Oral or NG Tube Daily     tacrolimus  5 mg Per OG Tube BID IS       Infusion Meds    dextrose       dextrose       esmolol 50 mcg/kg/min (10/22/21 0964)     fentaNYL       insulin regular 4 Units/hr (10/22/21 0902)     niCARdipine (CARDENE) infusion ADULT/PEDS GREATER than or EQUAL to 45 kg max conc       BETA BLOCKER NOT PRESCRIBED         PRN Meds  acetaminophen, bisacodyl, dextrose, dextrose, glucose **OR** dextrose **OR** glucagon, diphenhydrAMINE **OR** diphenhydrAMINE, fentaNYL, HYDROmorphone **OR** HYDROmorphone, lidocaine 4%, lidocaine (buffered or not buffered), magnesium hydroxide, methocarbamol, naloxone **OR** naloxone **OR** naloxone **OR** naloxone, ondansetron **OR** ondansetron, oxyCODONE **OR** oxyCODONE, prochlorperazine **OR** prochlorperazine, BETA BLOCKER NOT PRESCRIBED, sodium chloride (PF)    Allergies   No Known Allergies  Family History   Family History   Problem Relation Age of Onset     Diabetes Type 1 Mother      Heart Disease Mother      Chronic Obstructive Pulmonary Disease Mother      Rheumatoid Arthritis Father      Emphysema Paternal Grandfather      Social History    Social History     Tobacco Use     Smoking status: Never Smoker     Smokeless tobacco: Never Used   Substance Use Topics     Alcohol use: Never     Drug use: Never       Review of Systems   The 10 point Review of Systems is negative other than noted in the HPI or here.        PHYSICAL EXAMINATION  Temp:  [97.9  F (36.6  C)-99.5  F (37.5  C)] 99.3  F (37.4  C)  Pulse:  [] 111  Resp:  [24-48] 45  BP: (97)/(60) 97/60  MAP:  [59 mmHg-104 mmHg] 84 mmHg  Arterial Line BP: (103-195)/(27-78) 144/68  FiO2 (%):  [40 %-60 %] 50 %  SpO2:  [88 %-100 %] 96 %     Neurologic  Mental Status:  Intubated, no sedation, does not follow commands, opens eyes to voice and noxious but does not track or attend to examiner  Cranial Nerves:  PERRL, does not blink to threat bilaterally, corneal reflex intact bilaterally, face appears grosly symmetric  Motor:  does not move any extremity to deep noxious stimuli, moves head slightly to noxious  Reflexes:  toes mute, sustained ankle clonus in b/l LEs  Sensory:  see motor exam  Coordination:  unable to assess  Station/Gait:  deferred    Dysphagia Screen  intubated    Stroke Scales    NIHSS  Interval     Interval Comments     1a. Level of Consciousness 3-->Responds only with reflex motor or autonomic effects or totally unresponsive, flaccid, and areflexic   1b. LOC Questions 2-->Answers neither question correctly   1c. LOC Commands 2-->Performs neither task correctly   2.   Best Gaze 0-->Normal   3.   Visual 2-->Complete hemianopia   4.   Facial Palsy 0-->Normal symmetrical movements   5a. Motor Arm, Left 4-->No movement   5b. Motor Arm, Right 4-->No movement   6a. Motor Leg, Left 4-->No movement   6b. Motor Leg, right 4-->No movement   7.   Limb Ataxia 0-->Absent   8.   Sensory 2-->Severe to total sensory loss, patient is not aware of being touched in the face, arm, and leg   9.   Best Language 0-->No aphasia, normal (intubated)   10. Dysarthria (UN) Intubated or other physical barrier    11. Extinction and Inattention  0-->No abnormality   Total 27 (10/22/21 1033)       Imaging  I personally reviewed all imaging; relevant findings per HPI.     Lab Results Data   CBC  Recent Labs   Lab 10/22/21  0958 10/22/21  0407 10/21/21  0354   WBC 20.7* 18.7* 26.5*   RBC 2.96* 2.23* 2.77*   HGB 9.1* 6.6* 8.3*   HCT 28.0* 21.7* 26.7*   * 93* 114*     Basic Metabolic Panel    Recent Labs   Lab 10/22/21  0946 10/22/21  0802 10/22/21  0655 10/22/21  0556 10/22/21  0407 10/21/21  1951 10/21/21  1950 10/21/21  1713 10/21/21  1705 10/21/21  1401 10/21/21  1331   NA  --   --   --   --  143  --   --   --  144  --  146*   POTASSIUM  --   --   --   --  4.6  --  4.9  --  4.9   < > 4.1   CHLORIDE  --   --   --   --  102  --   --   --  105  --  105   CO2  --   --   --   --  34*  --   --   --  34*  --  36*   BUN  --   --   --   --  73*  --   --   --  61*  --  59*   CR  --   --   --   --  1.67*  --   --   --  1.52*  --  1.48*   * 143* 157*   < > 178*   < >  --    < > 149*   < > 149*   ERIK  --   --   --   --  8.0*  --   --   --  7.9*  --  8.2*    < > = values in this interval not displayed.     Liver Panel  Recent Labs   Lab 10/22/21  0407 10/21/21  1705 10/21/21  1331   PROTTOTAL 5.3* 5.4* 4.8*   ALBUMIN 1.2* 1.2* 1.3*   BILITOTAL 0.2 0.3 0.3   ALKPHOS 163* 172* 195*   AST 31 44 57*   * 391* 486*     INR    Recent Labs   Lab Test 10/22/21  0407 10/21/21  1332 10/21/21  0354   INR 1.33* 1.43* 1.38*      Lipid Profile    Recent Labs   Lab Test 10/22/21  0407 10/21/21  0354 10/20/21  0308 10/19/21  0338 09/07/21  1324   CHOL  --   --   --   --  214*   HDL  --   --   --   --  51   LDL  --   --   --   --  130*   TRIG 307* 486* 383*   < > 364*    < > = values in this interval not displayed.     A1C    Recent Labs   Lab Test 09/07/21  0928 09/05/21  1901   A1C 6.6* 6.5*     Troponin I  No results for input(s): TROPONIN, GHTROP in the last 168 hours.       Stroke Code Data Data   Stroke Code Data  (for stroke  code without tele)  Stroke code activated 10/22/21   0749   First stroke provider response 10/22/21   0753   Last known normal 10/21/21       Time of discovery   (or onset of symptoms) 10/21/21   2200   Head CT read by Stroke Neuro Dr/Provider 10/22/21   0832   Was stroke code de-escalated?   10/22/21 0916  presence of contraindications for both intravenous and intra-arterial stroke treatments

## 2021-10-22 NOTE — PROGRESS NOTES
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    OVERNIGHT EVENTS:  - 2 units PRBCs transfused      TODAY'S PROGRESS:   - Bumex infusion stopped  - Fungitel positive and Yeast present in blood culture- Micafungin started  - Caroline off  - Diamox 500 mg oral x 3  - Atorvastatin started per neuro  - GI bleed, EGD tomorrow  - TTE today for fungemia    - CT head shows infarcts in bilateral cerebral hemispheres and left cerebellar hemisphere.        PLAN:  Neuro/ pain/ sedation:  Acute Postoperative pain  Anxiety  Depression  Post op Stroke  Encephalopathy  - Patient did not move extremities on weaning sedation last night- stroke code called in the morning - CT head shows infarcts in bilateral cerebral hemispheres and left cerebellar hemisphere.  - EEG shows moderate to sever diffuse encephalopathy  - Pain: Fentanyl gtt.   - Tylenol, Robaxin, Gabapentin ramses withheld.. PRN oxycodone, IV dilaudid available.  - Sedation: Stopped.   - PTA Escitalopram withheld     Pulmonary care:   Acute Hypoxic Respiratory Failure  S/p B/l lung transplant  SALTY on home CPAP  - Ventilator Settings:CMV- 18/400/10/50  - off Caroline  - Bronchoscopy(10/17) did not show much secretions, edema +  - Bronchoscopy (10/20)- showed secretions in LLL. Plan to do bronchoscopy on alternate days.   - Levo-albuterol nebs and Mucomyst  - Chest Physiotherapy  - Daily CXR  - Titrate supplemental oxygen to maintain saturation above 92%.  - Pulmonary hygiene: Incentive spirometer every 15- 30 minutes when awake, flutter valve, C&DB  - No diuresis today         Cardiovascular:    S/p b/l lung transplant on 10/16/21 by   Shayna  HTN  A.fib- resolved  Cardiogenic Shock  Recent echo on 09/07/21 with LVEF of 60-65%. Pulmonary HTN.     - Cardiac Cath(9/8/21)-     Right sided filling pressures are mildly elevated.    Moderately elevated pulmonary artery hypertension.    Left sided filling pressures are normal.    Normal cardiac output level.    Normal coronary arteries with mild tortuosity     - Monitor hemodynamic status.   - Esmolol, Nicardipine gtt. MAP <100, SBP <140  - Amlodipine 10 mg daily. Hydralazine 25 mg oral q8  - Lactic Acid: 1.2  - Statin: Atorvastatin 80 mg started per neuro  - ASA: consider starting tomorrow after GI bleed resolution  - Amiodarone 400 mg BID  - TTE today for fungemia, consider TC tomorrow            GI care/ Nutrition:   Elevated LFTs- resolving  GI bleed  - Diet: NJ feeds. Advance feeds  - PPI BID  - Continue bowel regimen: miralax, senna  - Trend LFTs  - GI consulted, EGD tomorrow     Renal/ Fluid Balance/ Electrolytes:   Acute Kidney Injury- resolving  BL creat appears to be ~ 0.7  - No Diuresis today   - Diamox 500 mg po x 3 for metabolic alkalosis  - Strict I/O, daily weights  - Avoid/limit nephrotoxins as able  - Replete lytes PRN per protocol     Endocrine:    Stress induced hyperglycemia  Hypothyroidism  DM  RA  Preop A1c -6.6  - Insulin gtt  - Goal BG <180 for optimal healing  - PTA meds: Solumedrol 125 mg q6, Synthroid 25 mcg  - Received 2 doses of Rituximab in 6/21  - Rheumatology consult, will reach out to them later     ID/ Antibiotics:  Immunosuppression  Stress induced leukocytosis  - pre-op WBC -22.9  - Completed perioperative regimen- Ceftazidime, Vancomycin  - Ceftazidime restarted(10/19-)  - Fungitel +  - Micafungin(10/22-)  - Nystatin, Acyclovir  - Tacrolimus  - Basiliximab on POD#4(10/20/21)  - Methylpred followed by oral prednisone  - Continue to monitor fever curve, WBC and inflammatory markers as appropriate  - Follow up cultures  - (PTA Bactrim, Cefepime,  Doxycycline)  -Azathioprine to be avoided after transplant given low TPMT based on recommendations of transplant pulmonology, however if used, then azathioprine to be used in a low-dose  - Transplant ID consulted- suggested TTE. Consider removing or exchanging lines tomorrow  - TC tomorrow  - Ophthalmology consulted     Heme:     Acute blood loss anemia  Hypogammaglobulinemia s/p IVIG  Theombocytopenia  Pre-op Hb- 11.1, Platelet- 224  HIT panel -ve(10/20)  IVIG (10/21)  No s/sx active bleeding  - CBC in AM  - ID as above     MSK/ Skin:  Sternotomy  Surgical Incision  - Sternal precautions  - Postoperative incision management per protocol  - PT/OT/CR     Prophylaxis:    - Mechanical prophylaxis for DVT: SCDs  - Chemical DVT prophylaxis: Heparin subcutaneous  - PPI for 30 days postoperatively     Lines/ tubes/ drains:  - Right PICC(pre-op)  - Right radial A line(10/16)  - Right CVC(10/16)  - Right [place](10/16)  - ETT(10/16)  - [place](10/16)  - Chest tubes- 4 pleural, 1 mediastinal(10/16)  - OG(10/16)   - NJ(10/18)     Disposition:  - CV ICU.      Patient seen, findings and plan discussed with CV ICU staff. Will consider Trach if continues to be intubated till Monday.  Consider anticoagulation for stroke after EGD by GIJaneth Gutiérrez MD  PGY-3  Anesthesia    ====================================    SUBJECTIVE:   Patient is sedated and intubated, not moving extremities or following commands. Continues to be dependent on ventilator.    OBJECTIVE:   1. VITAL SIGNS:   Temp:  [97.9  F (36.6  C)-99.5  F (37.5  C)] 99.3  F (37.4  C)  Pulse:  [] 96  Resp:  [23-27] 26  BP: (97)/(60) 97/60  MAP:  [59 mmHg-104 mmHg] 70 mmHg  Arterial Line BP: (102-195)/(27-78) 118/54  FiO2 (%):  [40 %-60 %] 50 %  SpO2:  [88 %-100 %] 99 %  Ventilation Mode: CMV/AC  (Continuous Mandatory Ventilation/ Assist Control)  FiO2 (%): (S) 50 %  Rate Set (breaths/minute): 22 breaths/min  Tidal Volume Set (mL): (S) 420 mL  PEEP (cm H2O): 10  cmH2O  Oxygen Concentration (%): (S) 50 %  Resp: 26      2. INTAKE/ OUTPUT:   I/O last 3 completed shifts:  In: 4068.86 [I.V.:1708.86; NG/GT:920]  Out: 4750 [Urine:4270; Emesis/NG output:100; Chest Tube:380]    3. PHYSICAL EXAMINATION:   General: sedated and intubated  Neuro: can't be assessed  Resp: ventilator dependent  CV: RRR  Abdomen: Soft, Non-distended, Non-tender  Incisions: c/d/i  Extremities: warm and well perfused    4. INVESTIGATIONS:   Arterial Blood Gases   Recent Labs   Lab 10/22/21  0604 10/22/21  0407 10/22/21  0001 10/21/21  1953   PH 7.50* 7.47* 7.35 7.39   PCO2 40 49* 63* 60*   PO2 118* 149* 112* 79*   HCO3 31* 36* 35* 36*     Complete Blood Count   Recent Labs   Lab 10/22/21  0407 10/21/21  0354 10/20/21  0308 10/19/21  1008 10/19/21  0338 10/19/21  0338   WBC 18.7* 26.5* 27.4*  --   --  32.4*   HGB 6.6* 8.3* 8.4* 9.0*   < > 7.6*   PLT 93* 114* 100*  --   --  126*    < > = values in this interval not displayed.     Basic Metabolic Panel  Recent Labs   Lab 10/22/21  0556 10/22/21  0407 10/22/21  0402 10/22/21  0304 10/21/21  1951 10/21/21  1950 10/21/21  1713 10/21/21  1705 10/21/21  1401 10/21/21  1331 10/21/21  0912 10/21/21  0818   NA  --  143  --   --   --   --   --  144  --  146*  --  146*   POTASSIUM  --  4.6  --   --   --  4.9  --  4.9  --  4.1   < > 3.8   CHLORIDE  --  102  --   --   --   --   --  105  --  105  --  104   CO2  --  34*  --   --   --   --   --  34*  --  36*  --  36*   BUN  --  73*  --   --   --   --   --  61*  --  59*  --  58*   CR  --  1.67*  --   --   --   --   --  1.52*  --  1.48*  --  1.43*   * 178* 168* 183*   < >  --    < > 149*   < > 149*   < > 126*    < > = values in this interval not displayed.     Liver Function Tests  Recent Labs   Lab 10/22/21  0407 10/21/21  1705 10/21/21  1332 10/21/21  1331 10/21/21  0818 10/21/21  0354 10/21/21  0354 10/20/21  2211 10/20/21  1611   AST 31 44  --  57* 58*   < > 66*   < > 99*   * 391*  --  486* 551*   < > 558*   <  > 738*   ALKPHOS 163* 172*  --  195* 199*   < > 195*   < > 200*   BILITOTAL 0.2 0.3  --  0.3 0.3   < > 0.4   < > 0.5   ALBUMIN 1.2* 1.2*  --  1.3* 1.4*   < > 1.4*   < > 1.5*   INR 1.33*  --  1.43*  --   --   --  1.38*  --  1.25*    < > = values in this interval not displayed.     Pancreatic Enzymes  No lab results found in last 7 days.  Coagulation Profile  Recent Labs   Lab 10/22/21  0407 10/21/21  1332 10/21/21  0354 10/20/21  1611 10/17/21  1535 10/17/21  0346 10/16/21  2032 10/16/21  2032 10/16/21  1535 10/16/21  1535 10/15/21  0907 10/15/21  0907   INR 1.33* 1.43* 1.38* 1.25*   < > 1.77*   < > 1.54*   < > 1.38*   < > 1.02   PTT  --   --   --   --   --  41*  --  42*  --  51*  --  30    < > = values in this interval not displayed.         5. RADIOLOGY:   No results found for this or any previous visit (from the past 24 hour(s)).    =========================================

## 2021-10-22 NOTE — PROGRESS NOTES
Stroke Code Nurse-Responder Note    Arrival Time to Stroke Code: 0755    Stroke Code Team interventions:   - CT scan complete; not a tenectaplase candidate; team discussing other options  De-escalated 0916    ED/Bedside Nurse providing handoff: Tiff Xiao    Time left for CT: 0805    Time arrived to next location (ED/Unit/IR): 0835    ED/Bedside Nurse given handoff (name/time): Tiff Valenzuela RN

## 2021-10-22 NOTE — PROGRESS NOTES
Pulmonary Medicine  Cystic Fibrosis - Lung Transplant Team  Progress Note  10/22/2021       Patient: Edson Thornton  MRN: [id number]  : 1965 (age 56 year old)  Transplant: 10/16/2021 (Lung), POD#6  Admission date: 2021    Assessment & Plan:     Edson Thornton is a 56 year old male with a PMH significant for NSIP/ILD, bronchiectasis, moderate PH, RA, SALTY, chronic HSV infection, hypogammaglobulinemia, steroid-induced diabetes, hypothyroidism, HTN, HLD, duodenal anomaly, anxiety, and depression.  Admitted on 21 from OSH for acute on chronic respiratory failure 2/2 ILD exacerbation.  Pt. is now s/p BSLT on 10/16/21.  Surgery relatively uncomplicated but pt. with low cardiac index and PGD immediately post-op.  The patient remains intubated on Caroline in the CVICU, difficulty with weaning sedation.  Afib with RVR noted 10/18 and also with BRENNAN, improving with aggressive diuresis.     Today's recommendations:   - Repeat inspection/pulmonary toilet bronch tomorrow  - Agree with ongoing diuresis as tolerated  - CXR daily as below  - Await explant pathology (should include mediastinal LN)  - Tacrolimus level to trend supratherapeutic, dose decreased, continue daily levels (ordered)   - Prednisone taper due 10/24 (ordered)  - Continue to hold Bactrim, revisit 10/25  - CMV and EBV  (not yet ordered)  - Follow pending cultures (10/20), continue ceftazidime, defer vancomycin and antifungal coverage pending BDG fungitell/cultures   - Coccidioides Ag next due  (not yet ordered)  - IVIG with premedications today (ordered)  - DSA ordered 10/24  - Donor risk labs ordered 11/15     S/p BSLT for ILD:  Acute hypoxic/hypercapneic respiratory failure: Unfortunately had not received vaccination for flu, PNA, or COVID-19 PTA (not available after admission d/t immunosuppression state).  Remains on epinephrine for low CI.  Remains intubated on CMV 22//10/ with Caroline at 4, cisatracurium weaned off 10/19, difficulty  weaning sedation due to hypertension and vent dyssynchrony.  POD #1 CXR with new RLL focal opacity and marked increase in diffuse interstitial opacities concerning for pulmonary edema.  Started on Bumex drip and ABX resumed as below 10/19 given intermittent fevers.  Bronch 10/20 with tan secretions to LLL.  - Nebs: levalbuterol and Mucomyst QID  - Aggressive pulmonary toilet with chest physiotherapy QID (addition of Aerobika and incentive spirometry once extubated)  - Repeat inspection/pulmonary toilet bronch tomorrow.  - Ventilator and Caroline management per ICU team  - Anesthesia to place erector spinae catheters prior to anticipated extubation per our routine, deferred today  - Chest tubes managed by surgical team  - Agree with ongoing diuresis as tolerated  - CXR today with decreased bilateral interstitial and airspace opacities and ongoing small pleural effusions with overlying atelectasis (personally reviewed with Dr. Mckeon)  - Defer chest CT non-contrast for now pending clinical course  - S/p NJ feeding tube placement by radiology 10/18, TF management per RD and ICU team  - Recommend SLP consult as pt. nears extubation for swallowing evaluation  - Await explant pathology (should include mediastinal LN)     Immunosuppression:  - Induction therapy with basiliximab (and high dose IV steroid) given intraoperatively with repeat basiliximab POD#4  - Tacrolimus 4 mg BID (10/20, suspension via OG given concern for inadequate SL administration).  Goal level 8-12.  Level 10/21 subtherapeutic at 13.5, dose decreased to 4 mg BID, repeat level daily for now (ordered).  - MMF 1500 mg BID (on PTA, AZA to be avoided given TPMT)  - Prednisolone 17.5 mg BID with next taper on 10/24 (ordered) per lung transplant protocol:  Date AM dose (mg) PM dose (mg)   10/17/21 17.5 17.5   10/24/21 15 15   10/31/21 15 12.5   11/7/21 12.5 12.5   11/21/21 12.5 10   12/5/21 10 10   1/2/22 10 7.5   1/30/22 7.5 7.5   2/27/22 7.5 5   3/27/22 5 5    4/24/22 5 2.5      Prophylaxis:   - Bactrim for PJP ppx deferred post-op pending assessment of renal function, revisit 10/25  - Nystatin for oral candidiasis ppx, 6 month course  - See below for serologies and viral ppx:    Donor Recipient Intervention   CMV status Negative Negative None   EBV status Positive Positive N/A, EBV monthly (11/16, not yet ordered)   HSV status N/A Positive Acyclovir POD #1-30   (recent infection history)      Primary graft dysfunction (per ISHLT guidelines):  See chart below:    POD #0  (~0 hours) POD #1  (~24 hours) POD #2   (~48 hours) POD#3   (~72 hours)   Date 10/16 10/17 10/18 10/19   Time 1536 1537  1629 1559   Intubated Y Y Y Y   PaO2 227 108  116  84   FiO2 100 100  60  50   P/F Ratio 227 108  193  168   PGD Grade   (0=mild, 3=severe) 2 3  3  3   ECMO N N N N   Inhaled NO/Flolan Y Y Y Y   ISHLT PGD Scoring  Grade 0 - PaO2/FiO2 >300 and normal chest radiograph (absence of diffuse allograft infiltrates)  Grade 1 - PaO2/FiO2 >300 and diffuse allograft infiltrates on chest radiograph  Grade 2 - PaO2/FiO2 between 200 and 300  Grade 3 - PaO2/FiO2 <200 and/or on ECMO     ID: Concern for possible Strongyloides exposure pre-transplant s/p ivermectin x1 dose (9/17).  Fevers post-op, Tmax 101.7 POD #1.  S/p IV ceftaz/vancomycin for 48h per protocol.  Febrile again overnight 10/19 to 10/20.  - Donor cultures NGTD (UNOS data personally reviewed this morning)  - Recipient cultures NGTD (including fungal and AFB)   - Blood cultures (10/17, 10/20) NGTD  - Bronch cultures (10/17) with Staph epidermidis and Candida albicans, repeat (10/20) with GPC and yeast (cytology pending)  - Sputum culture (10/17) with normal jean marie, repeat (10/20) with GPC and Candida  - BDG fungitell (10/20) pending  - Monthly Coccidioides Ag x12 months post-transplant per ID (urine and serum negative 10/17), due 11/17 (not yet ordered)  - ABX: ceftazidime (resumed 10/19 given fevers), defer vancomycin and antifungal  coverage pending BDG fungitell/cultures  - Low threshold for transplant ID consult given pre-transplant ID history     HSV: Chronic intermittent active infection pre-transplant with recent HSV infection: crusted lesions throughout left side of jaw, s/p 10 day treatment course of ACV through 10/9.  HSV PCR blood negative 10/17.  - ACV ppx as above (starting on POD #1 instead of POD #8 given HSV history and location)     Hypogammaglobulinemia: IgG previously low at 364 (9/7).  Noted at 265 at time of transplant (10/15).  - IVIG with premedication 10/21 (ordered)     Positive cross match: Note that he received two doses of rituximab in June, which is likely contributing to cross match result.  DSA negative 10/17.  - Repeat DSA weekly x2 then q2 weeks (due 10/24, ordered)     PHS risk criteria donor:  Additional labs required post-transplant (between 4-8 weeks post-op): Hepatitis B, Hepatitis C, and HIV by VISHAL ([id number], ordered POD #30).     SALTY: Noted pre-transplant. Home CPAP 6-12 cmH2)  - Resume BiPAP at night post-extubation     Other relevant problems being managed by primary team:    #. New CVA: Pt has been unable to arouse but did move extremities on 10/21/21 in AM. Head CTA shows new multiple CVA, neurology input appr. EEG is neg for seizures (10/22/2021).    #. GI bleed: Hgb dropped by ~2g (10/22/2021). GI bleed noted by OG.  - GI Consulted, EGD tomorrow in AM.     Afib with RVR: Noted 10/18, started on amiodarone drip.  Currently in sinus rhythm.    - Management per ICU team     Elevated LFTs: Possible shock liver post-op.  Initial ALT//230 evening of surgery, then with marked increase to 1550/1246 POD #1.  Gradual improvement.  - Daily LFTs     BRENNAN: Baseline creatinine 0.7, increased to 1.2 POD #1 and further increased to 2.05 POD #2.  S/p 3.5 L volume resuscitation the first 12 hours post-op for rising lactic acid (peak 9).  UO also downtrending.  Improving with aggressive diuresis.  - Bactrim held as  "above  - Management per ICU team    We appreciate the excellent care provided by the CVICU and CVTS teams.  Recommendations communicated via in person rounding and this note.  Will continue to follow along closely, please do not hesitate to call with any questions or concerns.     Subjective & Interval History:     Difficulty weaning sedation due to increased RR and HTN.  Due to not moving extremities since yesterday, head CTA obtained which showed new CVA. Today having GI bleed.     Review of Systems:     ROS as above otherwise limited due to intubation and sedation.    Physical Exam:     Vital signs:  Temp: (!) 100.9  F (38.3  C) Temp src: Bladder BP: 97/60 Pulse: 85   Resp: 30 SpO2: 97 % O2 Device: Mechanical Ventilator Oxygen Delivery: 15 LPM Height: 162.6 cm (5' 4\") Weight: 74.8 kg (164 lb 14.5 oz)  I/O:     Intake/Output Summary (Last 24 hours) at 10/22/2021 1755  Last data filed at 10/22/2021 1700  Gross per 24 hour   Intake 4413.49 ml   Output 3880 ml   Net 533.49 ml       Constitutional: Lying in bed, in no apparent distress.   HEENT: Eyes with pink conjunctivae, anicteric.  Orally intubated via ETT.  Nasal FT.  PULM: Mildly diminished air flow to bases bilaterally.  Coarse t/o.  Non-labored breathing on full vent support.  CV: Normal S1 and S2.  RRR.  No murmur, gallop, or rub.  No peripheral edema.   ABD: Hypoactive BS, soft, nondistended.    MSK: No apparent muscle wasting.   NEURO: Sedated.  SKIN: Warm, dry.  No rash on limited exam.  Clamshell incision covered with wound vac.  PSYCH: Calm.     Lines, Drains, and Devices:  PICC Triple Lumen Right  (Active)   Site Assessment WDL 10/21/21 0400   External Cath Length (cm) 0 cm 10/11/21 1140   Dressing Intervention Chlorhexidine patch;Transparent 10/21/21 0400   Dressing Change Due 10/24/21 10/21/21 0400   PICC Comment CDI 10/21/21 0400   [place] - Status infusing 10/21/21 0400   Gray - Cap Change Due 10/22/21 10/21/21 0400   Red - Status infusing 10/21/21 " 0400   Red - Cap Change Due 10/22/21 10/21/21 0400   White - Status infusing 10/21/21 0400   White - Cap Change Due 10/22/21 10/21/21 0400   Extravasation? No 10/21/21 0400   Line Necessity Yes, meets criteria 10/21/21 0400   Number of days:        Arterial Line 10/16/21 (Active)   Site Assessment WDL 10/21/21 0400   Line Status Pulsatile blood flow 10/21/21 0400   Arterine Line Cap Change Due 10/22/21 10/21/21 0400   Art Line Waveform Appropriate 10/21/21 0400   Art Line Interventions Leveled;Connections checked and tightened;Flushed per protocol 10/21/21 0400   Color/Movement/Sensation Capillary refill less than 3 sec 10/21/21 0400   Line Necessity Yes, meets criteria 10/21/21 0400   Dressing Type Transparent 10/21/21 0400   Dressing Status Clean, dry, intact 10/21/21 0400   Dressing Intervention Dressing changed/new dressing 10/17/21 0400   Dressing Change Due 10/24/21 10/21/21 0400   Number of days: 5       CVC Double Lumen Right Internal jugular (Active)   Site Assessment WDL 10/21/21 0400   Dressing Type Chlorhexidine sponge;Transparent 10/21/21 0400   Dressing Status clean;dry;intact 10/21/21 0400   Dressing Intervention new dressing;dressing changed 10/17/21 0000   Dressing Change Due 10/24/21 10/21/21 0400   Line Necessity yes, meets criteria 10/21/21 0400   Brown - Status saline locked 10/21/21 0400   Brown - Cap Change Due 10/22/21 10/21/21 0400   White - Status infusing 10/21/21 0400   White - Cap Change Due 10/22/21 10/21/21 0400   Phlebitis Scale 0-->no symptoms 10/21/21 0400   Infiltration? no 10/21/21 0400   Infiltration Scale 0 10/20/21 1600   Infiltration Site Treatment Method  None 10/20/21 1600   CVC Comment MAC 10/21/21 0400   Number of days: 5       Pulmonary Artery Catheter - Triple Lumen 10/16/21 1500 Right internal jugular vein (Active)   Dressing dressing dry and intact 10/21/21 0400   Securement secured with sutures 10/21/21 0400   PA Catheter Markings (cm) 47 10/21/21 0400   Phlebitis  0-->no symptoms 10/21/21 0400   Infiltration 0-->no symptoms 10/21/21 0400   Waveform normal 10/21/21 0400   Lumen A - Color WHITE 10/21/21 0400   Lumen A - Status transduced 10/21/21 0400   Lumen A - Cap Change Due 10/22/21 10/21/21 0400   Lumen B - Color YELLOW 10/21/21 0400   Lumen B - Status transduced 10/21/21 0400   Lumen C - Color BLUE 10/21/21 0400   Lumen C - Status infusing 10/21/21 0400   Lumen C - Cap Change Due 10/22/21 10/21/21 0400   Number of days: 5     Data:     LABS    CMP:   Recent Labs   Lab 10/22/21  1601 10/22/21  1600 10/22/21  1504 10/22/21  1349 10/22/21  1039 10/22/21  0958 10/22/21  0556 10/22/21  0407 10/21/21  1951 10/21/21  1950 10/21/21  1713 10/21/21  1705 10/21/21  1401 10/21/21  1331   *  --   --   --   --  147*  --  143  --   --   --  144   < > 146*   POTASSIUM 3.6  --   --   --   --  3.5  --  4.6  --  4.9   < > 4.9   < > 4.1   CHLORIDE 109  --   --   --   --  106  --  102  --   --   --  105   < > 105   CO2 31  --   --   --   --  34*  --  34*  --   --   --  34*   < > 36*   ANIONGAP 7  --   --   --   --  7  --  7  --   --   --  5   < > 5   * 159* 183* 195*   < > 92   < > 178*   < >  --    < > 149*   < > 149*   BUN 68*  --   --   --   --  62*  --  73*  --   --   --  61*   < > 59*   CR 1.47*  --   --   --   --  1.54*  --  1.67*  --   --   --  1.52*   < > 1.48*   GFRESTIMATED 53*  --   --   --   --  50*  --  45*  --   --   --  50*   < > 52*   ERIK 8.2*  --   --   --   --  7.9*  --  8.0*  --   --   --  7.9*   < > 8.2*   MAG 2.1  --   --   --   --   --   --  2.4*  --  2.4*  --   --   --  2.3   PHOS 2.8  --   --   --   --   --   --  6.7*  --  7.5*  --   --   --  5.0*   PROTTOTAL 5.5*  --   --   --   --  5.1*  --  5.3*  --   --   --  5.4*   < > 4.8*   ALBUMIN 1.2*  --   --   --   --  1.1*  --  1.2*  --   --   --  1.2*   < > 1.3*   BILITOTAL 0.3  --   --   --   --  0.3  --  0.2  --   --   --  0.3   < > 0.3   ALKPHOS 178*  --   --   --   --  156*  --  163*  --   --   --  172*    < > 195*   AST 32  --   --   --   --  27  --  31  --   --   --  44   < > 57*   *  --   --   --   --  281*  --  314*  --   --   --  391*   < > 486*    < > = values in this interval not displayed.     CBC:   Recent Labs   Lab 10/22/21  1407 10/22/21  0958 10/22/21  0407 10/21/21  0354 10/20/21  0308 10/20/21  0308   WBC  --  20.7* 18.7* 26.5*  --  27.4*   RBC  --  2.96* 2.23* 2.77*  --  2.77*   HGB 10.4* 9.1* 6.6* 8.3*   < > 8.4*   HCT  --  28.0* 21.7* 26.7*  --  26.0*   MCV  --  95 97 96  --  94   MCH  --  30.7 29.6 30.0  --  30.3   MCHC  --  32.5 30.4* 31.1*  --  32.3   RDW  --  16.1* 17.0* 17.0*  --  16.2*   PLT  --  112* 93* 114*  --  100*    < > = values in this interval not displayed.       INR:   Recent Labs   Lab 10/22/21  1407 10/22/21  0407 10/21/21  1332 10/21/21  0354   INR 1.28* 1.33* 1.43* 1.38*       Glucose:   Recent Labs   Lab 10/22/21  1601 10/22/21  1600 10/22/21  1504 10/22/21  1349 10/22/21  1142 10/22/21  1039   * 159* 183* 195* 146* 88       Blood Gas:   Recent Labs   Lab 10/22/21  1602 10/22/21  1601 10/22/21  1148 10/22/21  0959 10/22/21  0958   PHV  --  7.48* 7.44*  --  7.46*   PCO2V  --  48 53*  --  52*   PO2V  --  35 33  --  36   HCO3V  --  36* 36*  --  37*   LORI  --  11.0* 10.7*  --  11.4*   O2PER 50 50 50   < > 50    < > = values in this interval not displayed.       Culture Data No results for input(s): CULT in the last 168 hours.    Virology Data:   Lab Results   Component Value Date    FLUAH1 Negative 10/17/2021    FLUAH3 Negative 10/17/2021    UN1605 Negative 10/17/2021    IFLUB Negative 10/17/2021    RSVA Negative 10/17/2021    RSVB Negative 10/17/2021    PIV1 Negative 10/17/2021    PIV2 Negative 10/17/2021    PIV3 Negative 10/17/2021    HMPV Negative 10/17/2021    HRVS Negative 10/17/2021    ADVBE Negative 10/17/2021    ADVC Negative 10/17/2021       Historical CMV results (last 3 of prior testing):  No results found for: CMVQNT  No results found for: CMVLOG    Urine  Studies    Recent Labs   Lab Test 10/17/21  1642 10/17/21  1041   URINEPH 5.0 5.0   NITRITE Negative Negative   LEUKEST Negative Trace*   WBCU 25* 44*       Most Recent [place] Pulmonary Function Testing (FVC/FEV1 only)  No results found for: 20002  No results found for: 20003  No results found for: 20015  No results found for: 20016    IMAGING    Recent Results (from the past 48 hour(s))   US Upper Extremity Venous Duplex Right Portable    Narrative    EXAMINATION: DOPPLER VENOUS ULTRASOUND OF THE RIGHT UPPER EXTREMITY,  10/19/2021 9:53 AM     COMPARISON: None.    HISTORY: Right upper extremity swelling    TECHNIQUE:  Gray-scale evaluation with compression, spectral flow and  color Doppler assessment of the deep venous system of the right upper  extremity.    FINDINGS:  Normal blood flow and waveforms are demonstrated in the internal  jugular vein and there is no evidence of echogenic thrombus within the  lumen. There is normal compressibility of the brachial, basilic and  cephalic veins.  The right innominate, subclavian, and axillary veins are obscured due  to subcutaneous emphysema.    Right internal jugular central catheter in place without evidence of  echogenic thrombus in the lumen.  Right basilic venous approach PICC line starting in the antecubital  fossa.      Impression    IMPRESSION:  1.  No sonographic evidence of left upper extremity deep venous  thrombosis.    I have personally reviewed the examination and initial interpretation  and I agree with the findings.    JODI MENDEZ MD         SYSTEM ID:  [id number]   XR Chest Port 1 View    Narrative    Chest one view portable    HISTORY: Chest tube output increased    COMPARISON STUDY: Same day at 00 52    FINDINGS: Changes of bilateral lung transplant. Endotracheal tube,  bilateral chest tubes, clamshell sternotomy, feeding tube, NG tube,  right IJ [place]-Sintia catheter unchanged. Right PICC in the SVC with tip  not well visualized from overlying  catheters. Mediastinal drain in  place. Cardiac silhouette is enlarged. Interstitial opacities  bilaterally unchanged. Subcutaneous emphysema bilaterally unchanged.  Bibasilar atelectasis.      Impression    IMPRESSION: Interstitial edema and bibasilar atelectasis.    DAVIN ANGUIANO MD         SYSTEM ID:  [id number]   XR Chest Port 1 View    Narrative    EXAMINATION: XR CHEST PORT 1 VIEW, 10/20/2021 4:03 AM    INDICATION: s/p lung transplant    COMPARISON: 10/19/2021    FINDINGS: Single portable 30 degree upright AP radiograph of the  chest. ET tube projects at the mid thoracic trachea. Feeding tube  courses below the left hemidiaphragm, tip is not within view. NG/OG  side-port projects over the stomach. Right IJ [place]-Sintia catheter with  tip terminating over the proximal right main pulmonary artery. Right  upper extremity PICC line, tip is obscured by [place]-Sintia catheter.  Bilateral chest tubes and mediastinal drain.    Trachea is midline. The cardiomediastinal silhouette is indistinct.  Small bilateral pleural effusions. No appreciable pneumothorax.  Unchanged interstitial and airspace opacities.    Partially visualized upper abdomen is unremarkable. Subcutaneous  emphysema along the left chest wall and right base of the neck.      Impression    IMPRESSION:  1. Postsurgical changes of bilateral lung transplant.  2. Bilateral pleural effusions with overlying atelectasis. Unchanged  bilateral interstitial and airspace opacities.  3. No appreciable pneumothorax.    I have personally reviewed the examination and initial interpretation  and I agree with the findings.    ZHANNA DONG MD         SYSTEM ID:  [id number]   XR Abdomen Port 1 View    Narrative    EXAM: XR ABDOMEN PORT 1 VIEWS  10/20/2021 11:41 AM      HISTORY: Increased OG output    COMPARISON: Abdominal radiograph 10/16/2021    FINDINGS: Portable abdominal radiograph. Gastric tube terminating over  the stomach. Enteric tube terminating over the gastric  pylorus.  Nonobstructive bowel gas pattern. Multiple small radiodensities  projecting over the right colon, presumably intraluminal. No  pneumatosis. No portal venous gas. Watson catheter projecting over the  pelvis. Partially visualized mediastinal drain and right basilar chest  tube. Bibasilar opacities.      Impression    IMPRESSION:   1. Nonobstructive bowel gas pattern.  2. Feeding tube is coiled within the stomach.  3. Gastric tube tip and sidehole project over the stomach.    I have personally reviewed the examination and initial interpretation  and I agree with the findings.    FREDDY LEAHY MD         SYSTEM ID:  [id number]   XR Feeding Tube Placement    Narrative    Feeding tube placement: 10/20/2021 4:15 PM    Comparison: None    Indication: Advancement of feeding tube    Fluoroscopy time: 1.42 minutes    Technique: After injection of Xylocaine gel into the left nostril, a  feeding tube was advanced under fluoroscopic guidance.    Findings: The feeding tube was advanced with the tip in the third  portion of the duodenum. A small amount of barium was injected to  demonstrate placement within the small bowel. The feeding tube was  then flushed with normal saline. The feeding tube was secured using a  nasal bridle.      Impression    Impression: Uncomplicated feeding tube advancement with tip in the  third portion of the duodenum.    I, FITO PERALES MD, attest that I was immediately available to  provide guidance and assistance during the entirety of the procedure.  The examination was performed portable in the ICU therefore a  radiologist was not directly present during tube placement.    I have personally reviewed the examination and initial interpretation  and I agree with the findings.    FITO PERALES MD         SYSTEM ID:  [id number]   US Upper Extremity Venous Duplex Left    Narrative    EXAMINATION: DOPPLER VENOUS ULTRASOUND OF THE LEFT UPPER EXTREMITY,  10/20/2021 4:29 PM     COMPARISON:  None.    HISTORY: s/p surgery- assess for DVT. Post op fever      TECHNIQUE:  Gray-scale evaluation with compression, spectral flow and  color Doppler assessment of the deep venous system of the left upper  extremity.    FINDINGS:  Left: Normal blood flow and waveforms are demonstrated in the internal  jugular, innominate, subclavian, and axillary veins. There is normal  compressibility of the paired brachial, basilic and cephalic veins.      Impression    IMPRESSION:  No evidence of left upper extremity deep venous thrombosis.    I have personally reviewed the examination and initial interpretation  and I agree with the findings.    FREDDY LEAHY MD         SYSTEM ID:  [id number]   US Lower Extremity Venous Duplex Bilateral    Narrative    EXAMINATION: DOPPLER VENOUS ULTRASOUND OF BILATERAL LOWER EXTREMITIES,  10/20/2021 4:29 PM     COMPARISON: None.    HISTORY: To assess for DVT status post surgery. Postop fever.    TECHNIQUE:  Gray-scale evaluation with compression, spectral flow and  color Doppler assessment of the deep venous system of both legs from  groin to knee, and then at the ankles.    FINDINGS:  In both lower extremities, the common femoral, femoral, popliteal and  posterior tibial veins demonstrate normal compressibility and blood  flow.      Impression    IMPRESSION:  No evidence of deep venous thrombosis in either lower extremity.    I have personally reviewed the examination and initial interpretation  and I agree with the findings.    FREDDY LEAHY MD         SYSTEM ID:  [id number]   XR Chest Port 1 View    Narrative    EXAMINATION: XR CHEST PORT 1 VIEW, 10/21/2021 1:56 AM    INDICATION: s/p lung transplant    COMPARISON: 10/20/2021    FINDINGS: ET tube projects 1.5 cm above the shiv. Stable bilateral  chest tubes and mediastinal drain. Right IJ [place]-Sintia catheter with  tip terminating over the proximal right main pulmonary artery. Enteric  tube courses below the left hemidiaphragm, tip is  not within view.  NG/OG tube courses below the left hemidiaphragm, tip is not within  view. Right upper extremity PICC line, tip is obscured by additional  lines projecting over the mediastinum. Intact clam shell sternotomy  wires. Cutaneous staples project over the low thorax.    Postsurgical changes of bilateral lung transplant. Trachea is midline.  The cardiomediastinal silhouette is indistinct. Small bilateral  pleural effusions. Bibasilar atelectasis. No appreciable pneumothorax.  Slightly decreased bilateral interstitial and airspace opacities.    Partially visualized upper abdomen is unremarkable. No acute osseous  abnormality. Extensive subcutaneous emphysema along the left chest  wall and base of the right neck.      Impression    IMPRESSION:  1. Postsurgical changes of bilateral lung transplant.  2. Stable support devices.  3. Small bilateral pleural effusions with overlying atelectasis and  decreased bilateral interstitial and airspace opacities.  4. No appreciable pneumothorax.    I have personally reviewed the examination and initial interpretation  and I agree with the findings.    MAURICIO NAVAS MD         SYSTEM ID:  [id number]

## 2021-10-22 NOTE — CONSULTS
GASTROENTEROLOGY CONSULTATION      Date of Admission:  9/5/2021          ASSESSMENT AND RECOMMENDATIONS:   Assessment:  Edson Thornton is a 56 year old male with past medical history significant for HTN, HLD, RA, SALTY, chronic HSV infection, steroid induced DM and ILD now s/p lung transplant 10/16/21 c/b afib with RVR (amiodarone), cerebellar infarcts and new blood via OG tube this pm for which GI is consulted.    Acute blood loss anemia  Blood via OGT  Duodenal diverticulum  Borderline tachycardia the past ~36 hours, but also corresponds with weaning sedation. Hgb this am of 6.6 >> 10.4 s/p 2 unit(s) RBC suggesting the 6.6 may have been somewhat spurious, though he is producing melanie red blood via OG, stool thus far remains brown. Differential includes OG trauma, PUD, stress ulcer, diffuse gastritis/duodenitis, erosive esophagitis. CT abdomen/pelvis without suggestion of recurrent irritation of duodenal diverticulum. Any of these may be more likely to bleed in setting of subcutaneous heparin and steroid therapy. Unlikely ischemia. Will escalate PPI therapy, continue to monitor closely and reassess for possible EGD tomorrow am.     Recommendations  -IV PPI BID  -Hold tube feeds  -Monitor and carefully document stool and OG output  -Transfuse hgb < 7 from GI standpoint  -Tentative plan for EGD at bedside tomorrow am    Thank you for involving us in this patient's care. Please do not hesitate to contact the GI service with any questions or concerns.     Pt care plan discussed with Dr. Pisano, GI staff physician.    ILIR Saldaña CNP  Text page  -------------------------------------------------------------------------------------------------------------------          Chief Complaint:   We were asked by Dr Corral of [facility] to evaluate this patient with blood via OGT    History is obtained from the chart, significant other, nursing and the medical record.          History of Present Illness:   Edson Thornton is a  56 year old male with past medical history significant for HTN, HLD, RA, SALTY, chronic HSV infection, steroid induced DM and ILD now s/p lung transplant 10/16/21 c/b afib with RVR (amiodarone), cerebellar infarcts and new blood via OG tube this pm for which GI is consulted.    OG bleeding noted this pm around noon with LIS. Also putting out 20-40 ml bloody chest tube drainage hourly at this time. No blood noted in stools. Has been on daily PPI PO. TF on.    On subcutaneous heparin. Team wanted to start IV heparin. On fentanyl drip for sedation. 17.5 mg prednisolone BID, MMF, and tacro. On cardapine and Esmolol.     Of note he was hospitalized within [facility] in July for abdominal pain. CT demonstrated 2.6 cm mixed gas and fluid collection in the pancreatic head which communicates with the descending duodenum. Associated adjacent small locule of gas, which may be extraluminal. Thought to represent contained perforated duodenal ulcer versus duodenal diverticula. UGI series suggested duodenal diverticula. Surgery felt unlikely perforation. Improved on antibiotics. Recommended outpatient EGD that does not appear to have been completed due to respiratory status, per SO.            Past Medical History:   Reviewed and edited as appropriate  Past Medical History:   Diagnosis Date     BRENNAN (acute kidney injury) (H) 10/17/2021     Anxiety      Depression      HLD (hyperlipidemia)      HTN (hypertension)      Hypothyroidism      ILD (interstitial lung disease) (H)      SALTY on CPAP      Oxygen dependent     BL 4L since ~6/2021     Rheumatoid arthritis (H)     signs ~5/2020, dx 5/2021     S/P lung transplant (H) 10/16/2021     Shock liver 10/17/2021     Steroid-induced hyperglycemia      Traction bronchiectasis (H)             Past Surgical History:   Reviewed and edited as appropriate   Past Surgical History:   Procedure Laterality Date     COLONOSCOPY W/ BIOPSIES AND POLYPECTOMY  07/21/2020     CV CORONARY ANGIOGRAM N/A  9/8/2021    Procedure: Coronary Angiogram with possible intervention;  Surgeon: Jovon Bullock MD;  Location:  HEART CARDIAC CATH LAB     CV RIGHT HEART CATH MEASUREMENTS RECORDED N/A 9/8/2021    Procedure: Right Heart Cath;  Surgeon: Jovon Bullock MD;  Location:  HEART CARDIAC CATH LAB     EXTRACTION(S) DENTAL N/A 9/22/2021    Procedure: EXTRACTION tooth #19;  Surgeon: Deepak Tobin DDS;  Location: [facility] OR     HERNIA REPAIR       right acl       TRANSPLANT LUNG RECIPIENT SINGLE X2 Bilateral 10/16/2021    Procedure: TRANSPLANT, LUNG, RECIPIENT, BILATERAL, Bronchoscopy, on-pump perfusion, bilateral clamshell sternotomy;  Surgeon: Yanick Corral MD;  Location:  OR     XR ACROMIOCLAVICULAR JOINT BILATERAL              Previous Endoscopy:   No results found for this or any previous visit.         Social History:   Reviewed and edited as appropriate  Social History     Socioeconomic History     Marital status: Single     Spouse name: Not on file     Number of children: Not on file     Years of education: Not on file     Highest education level: Not on file   Occupational History     Not on file   Tobacco Use     Smoking status: Never Smoker     Smokeless tobacco: Never Used   Substance and Sexual Activity     Alcohol use: Never     Drug use: Never     Sexual activity: Not on file   Other Topics Concern     Parent/sibling w/ CABG, MI or angioplasty before 65F 55M? Not Asked   Social History Narrative     Not on file     Social Determinants of Health     Financial Resource Strain:      Difficulty of Paying Living Expenses:    Food Insecurity:      Worried About Running Out of Food in the Last Year:      Ran Out of Food in the Last Year:    Transportation Needs:      Lack of Transportation (Medical):      Lack of Transportation (Non-Medical):    Physical Activity:      Days of Exercise per Week:      Minutes of Exercise per Session:    Stress:      Feeling of Stress :    Social Connections:       Frequency of Communication with Friends and Family:      Frequency of Social Gatherings with Friends and Family:      Attends Orthodox Services:      Active Member of Clubs or Organizations:      Attends Club or Organization Meetings:      Marital Status:    Intimate Partner Violence:      Fear of Current or Ex-Partner:      Emotionally Abused:      Physically Abused:      Sexually Abused:             Family History:   Reviewed and edited as appropriate  Family History   Problem Relation Age of Onset     Diabetes Type 1 Mother      Heart Disease Mother      Chronic Obstructive Pulmonary Disease Mother      Rheumatoid Arthritis Father      Emphysema Paternal Grandfather        The patients family history was reviewed today and is non-contributory.          Allergies:   Reviewed and edited as appropriate   No Known Allergies         Medications:     Current Facility-Administered Medications   Medication     acetaminophen (TYLENOL) tablet 650 mg     acetylcysteine (MUCOMYST) 20 % nebulizer solution 2 mL     acyclovir (ZOVIRAX) suspension 400 mg     amiodarone (PACERONE) tablet 400 mg     bisacodyl (DULCOLAX) Suppository 10 mg     [START ON 10/24/2021] calcium carbonate 600 mg-vitamin D 400 units (CALTRATE) per tablet 1 tablet     cefTAZidime (FORTAZ) 2 g vial to attach to  ml bag for ADULTS or NS 50 ml bag for PEDS     dextrose 10% infusion     dextrose 10% infusion     glucose gel 15-30 g    Or     dextrose 50 % injection 25-50 mL    Or     glucagon injection 1 mg     diphenhydrAMINE (BENADRYL) capsule 25 mg    Or     diphenhydrAMINE (BENADRYL) injection 25 mg     [Held by provider] escitalopram (LEXAPRO) tablet 5 mg     esmolol 20 mg/mL in sodium chloride 0.9% infusion     fentaNYL (SUBLIMAZE) 50 mcg/mL bolus from infusion pump 25 mcg     fentaNYL (SUBLIMAZE) infusion     [Held by provider] gabapentin (NEURONTIN) solution 100 mg     heparin ANTICOAGULANT injection 5,000 Units     HYDROmorphone (DILAUDID)  injection 0.2 mg    Or     HYDROmorphone (DILAUDID) injection 0.4 mg     insulin 1 unit/mL in saline (NovoLIN, HumuLIN Regular) drip - ADULT IV Infusion     levalbuterol (XOPENEX) neb solution 1.25 mg     levothyroxine (SYNTHROID/LEVOTHROID) tablet 25 mcg     lidocaine (LMX4) cream     lidocaine 1 % 0.1-1 mL     magnesium hydroxide (MILK OF MAGNESIA) suspension 30 mL     [Held by provider] melatonin tablet 5 mg     methocarbamol (ROBAXIN) tablet 750 mg     micafungin (MYCAMINE) 100 mg in sodium chloride 0.9 % 100 mL intermittent infusion     multivitamins w/minerals liquid 15 mL     mycophenolate (CELLCEPT BRAND) suspension 1,500 mg     naloxone (NARCAN) injection 0.2 mg    Or     naloxone (NARCAN) injection 0.4 mg    Or     naloxone (NARCAN) injection 0.2 mg    Or     naloxone (NARCAN) injection 0.4 mg     niCARdipine (CARDENE) 100 mg in sodium chloride 0.9 % 250 mL infusion     nystatin (MYCOSTATIN) suspension 1,000,000 Units     ondansetron (ZOFRAN-ODT) ODT tab 4 mg    Or     ondansetron (ZOFRAN) injection 4 mg     oxyCODONE (ROXICODONE) tablet 5 mg    Or     oxyCODONE IR (ROXICODONE) tablet 10 mg     pantoprazole (PROTONIX) 2 mg/mL suspension 40 mg    Or     pantoprazole (PROTONIX) IV push injection 40 mg     polyethylene glycol (MIRALAX) Packet 17 g     potassium chloride (KAYCIEL) solution 20 mEq     prednisoLONE (ORAPRED) 15 MG/5 ML solution 17.5 mg     [START ON 10/24/2021] predniSONE (DELTASONE) tablet 15 mg     prochlorperazine (COMPAZINE) injection 10 mg    Or     prochlorperazine (COMPAZINE) tablet 10 mg     protein modular (PROSOURCE TF) 1 packet     Reason beta blocker order not selected     senna-docusate (SENOKOT-S/PERICOLACE) 8.6-50 MG per tablet 1 tablet     sodium chloride (PF) 0.9% PF flush 3 mL     sodium chloride (PF) 0.9% PF flush 3 mL     [Held by provider] sulfamethoxazole-trimethoprim (BACTRIM/SEPTRA) suspension 80 mg    Or     [Held by provider] sulfamethoxazole-trimethoprim (BACTRIM)  "400-80 MG per tablet 1 tablet     tacrolimus (GENERIC EQUIVALENT) suspension 5 mg             Review of Systems:     A complete review of systems was performed and is negative except as noted in the HPI           Physical Exam:   BP 97/60   Pulse 93   Temp 99.9  F (37.7  C)   Resp 30   Ht 1.626 m (5' 4\")   Wt 74.8 kg (164 lb 14.5 oz)   SpO2 97%   BMI 28.31 kg/m    Wt:   Wt Readings from Last 2 Encounters:   10/22/21 74.8 kg (164 lb 14.5 oz)      Constitutional: Intubated and sedated  Eyes: Closed  Ears/nose/mouth/throat: ET and OGT in place without blood apparent in oropharynx  Neck: supple without obvious mass  CV: +2 LLE  Respiratory: Mechanical ventilation  Lymph: No submandibular, supraclavicular or inguinal lymphadenopathy  Abd:  Nondistended, +bs, no hepatosplenomegaly, nontender, no peritoneal signs, chest tubes bilaterally with dressings CDI  Skin: warm, perfused, no jaundice  Neuro: Sedated  Psych: Sedated  MSK: No gross deformities  Output: Chest tubes with dark red blood, putting out about 20-40 ml per hour. OGT with 200 ml melanie red blood. Stool appears semi formed light brown         Data:   Labs and imaging below were independently reviewed and interpreted    BMP  Recent Labs   Lab 10/22/21  1349 10/22/21  1142 10/22/21  1039 10/22/21  0958 10/22/21  0556 10/22/21  0407 10/21/21  1951 10/21/21  1950 10/21/21  1713 10/21/21  1705 10/21/21  1401 10/21/21  1331   NA  --   --   --  147*  --  143  --   --   --  144  --  146*   POTASSIUM  --   --   --  3.5  --  4.6  --  4.9  --  4.9   < > 4.1   CHLORIDE  --   --   --  106  --  102  --   --   --  105  --  105   ERIK  --   --   --  7.9*  --  8.0*  --   --   --  7.9*  --  8.2*   CO2  --   --   --  34*  --  34*  --   --   --  34*  --  36*   BUN  --   --   --  62*  --  73*  --   --   --  61*  --  59*   CR  --   --   --  1.54*  --  1.67*  --   --   --  1.52*  --  1.48*   * 146* 88 92   < > 178*   < >  --    < > 149*   < > 149*    < > = values in this " interval not displayed.     CBC  Recent Labs   Lab 10/22/21  1407 10/22/21  0958 10/22/21  0958 10/22/21  0407 10/22/21  0407 10/21/21  0354 10/21/21  0354 10/20/21  0308 10/20/21  0308   WBC  --   --  20.7*  --  18.7*  --  26.5*  --  27.4*   RBC  --   --  2.96*  --  2.23*  --  2.77*  --  2.77*   HGB 10.4*   < > 9.1*   < > 6.6*   < > 8.3*   < > 8.4*   HCT  --   --  28.0*  --  21.7*  --  26.7*  --  26.0*   MCV  --   --  95  --  97  --  96  --  94   MCH  --   --  30.7  --  29.6  --  30.0  --  30.3   MCHC  --   --  32.5  --  30.4*  --  31.1*  --  32.3   RDW  --   --  16.1*  --  17.0*  --  17.0*  --  16.2*   PLT  --   --  112*  --  93*  --  114*  --  100*    < > = values in this interval not displayed.     INR  Recent Labs   Lab 10/22/21  0407 10/21/21  1332 10/21/21  0354 10/20/21  1611   INR 1.33* 1.43* 1.38* 1.25*     LFTs  Recent Labs   Lab 10/22/21  0958 10/22/21  0407 10/21/21  1705 10/21/21  1331   ALKPHOS 156* 163* 172* 195*   AST 27 31 44 57*   * 314* 391* 486*   BILITOTAL 0.3 0.2 0.3 0.3   PROTTOTAL 5.1* 5.3* 5.4* 4.8*   ALBUMIN 1.1* 1.2* 1.2* 1.3*      PANCNo lab results found in last 7 days.    Imaging:    @CT-scan of abdomen and pelvis@

## 2021-10-22 NOTE — PLAN OF CARE
Major Shift Events: At one point pt not withdrawing from any extremities, or opening eyes. HGB 8.3->6.6  Neuro: @ 0600 opens eyes to voice, but still not w/d.   Resp: / Rate 20/ 50% fio2/ 10 peep. Nitric @ 2.  CV: S/ST. CI 3.6 CVP: 14 PA: 40/6 SVR: 694  : Watson in place good UO Bumex @ 1  GI: one small BM. (brown, not red)  Skin/Endo: Insulin on from 6-8 unit(s)/hr. Al.4  Plan: Wean nitric, find source of bleeding? Lower peep?  For vital signs and complete assessments, please see documentation flowsheets.

## 2021-10-22 NOTE — PROGRESS NOTES
Major Shift Events:  Stroke code called this morning. CT head & later CT C/A/P. Refer to provider notes. Patient not withdrawing to pain or moving spontaneously. Occasionally opening eyes to voice. Pupils 3-4, reactive. Tmax 100.9, PRN tylenol given and pan cultured. Nicardipine and Esmolol added to keep SBP <140. Bloody output from OG, GI consulted. Watson with good urine output. Labs supplemented PRN. Remains on Fentanyl, Insulin, Nicardipine, Esmolol gtts.    Plan: EGD at bedside tomorrow  For vital signs and complete assessments, please see documentation flowsheets. \

## 2021-10-23 ENCOUNTER — APPOINTMENT (OUTPATIENT)
Dept: NEUROLOGY | Facility: CLINIC | Age: 56
End: 2021-10-23
Attending: NURSE PRACTITIONER
Payer: COMMERCIAL

## 2021-10-23 ENCOUNTER — APPOINTMENT (OUTPATIENT)
Dept: GENERAL RADIOLOGY | Facility: CLINIC | Age: 56
End: 2021-10-23
Attending: STUDENT IN AN ORGANIZED HEALTH CARE EDUCATION/TRAINING PROGRAM
Payer: COMMERCIAL

## 2021-10-23 ENCOUNTER — APPOINTMENT (OUTPATIENT)
Dept: MRI IMAGING | Facility: CLINIC | Age: 56
End: 2021-10-23
Attending: STUDENT IN AN ORGANIZED HEALTH CARE EDUCATION/TRAINING PROGRAM
Payer: COMMERCIAL

## 2021-10-23 ENCOUNTER — APPOINTMENT (OUTPATIENT)
Dept: GENERAL RADIOLOGY | Facility: CLINIC | Age: 56
End: 2021-10-23
Attending: THORACIC SURGERY (CARDIOTHORACIC VASCULAR SURGERY)
Payer: COMMERCIAL

## 2021-10-23 ENCOUNTER — APPOINTMENT (OUTPATIENT)
Dept: CARDIOLOGY | Facility: CLINIC | Age: 56
End: 2021-10-23
Attending: STUDENT IN AN ORGANIZED HEALTH CARE EDUCATION/TRAINING PROGRAM
Payer: COMMERCIAL

## 2021-10-23 LAB
ALBUMIN SERPL-MCNC: 1.3 G/DL (ref 3.4–5)
ALBUMIN SERPL-MCNC: 1.4 G/DL (ref 3.4–5)
ALP SERPL-CCNC: 159 U/L (ref 40–150)
ALP SERPL-CCNC: 166 U/L (ref 40–150)
ALP SERPL-CCNC: 176 U/L (ref 40–150)
ALP SERPL-CCNC: 182 U/L (ref 40–150)
ALT SERPL W P-5'-P-CCNC: 164 U/L (ref 0–70)
ALT SERPL W P-5'-P-CCNC: 183 U/L (ref 0–70)
ALT SERPL W P-5'-P-CCNC: 210 U/L (ref 0–70)
ALT SERPL W P-5'-P-CCNC: 223 U/L (ref 0–70)
ANION GAP SERPL CALCULATED.3IONS-SCNC: 5 MMOL/L (ref 3–14)
ANION GAP SERPL CALCULATED.3IONS-SCNC: 5 MMOL/L (ref 3–14)
ANION GAP SERPL CALCULATED.3IONS-SCNC: 6 MMOL/L (ref 3–14)
AST SERPL W P-5'-P-CCNC: 21 U/L (ref 0–45)
AST SERPL W P-5'-P-CCNC: 22 U/L (ref 0–45)
AST SERPL W P-5'-P-CCNC: 25 U/L (ref 0–45)
AST SERPL W P-5'-P-CCNC: 27 U/L (ref 0–45)
ATRIAL RATE - MUSE: 103 BPM
BASE EXCESS BLDA CALC-SCNC: 1.2 MMOL/L (ref -9–1.8)
BASE EXCESS BLDA CALC-SCNC: 1.5 MMOL/L (ref -9–1.8)
BASE EXCESS BLDA CALC-SCNC: 2.3 MMOL/L (ref -9–1.8)
BASE EXCESS BLDA CALC-SCNC: 2.7 MMOL/L (ref -9–1.8)
BASE EXCESS BLDA CALC-SCNC: 5.6 MMOL/L (ref -9–1.8)
BASE EXCESS BLDA CALC-SCNC: 6.3 MMOL/L (ref -9–1.8)
BASE EXCESS BLDV CALC-SCNC: 0.8 MMOL/L (ref -7.7–1.9)
BASE EXCESS BLDV CALC-SCNC: 1.9 MMOL/L (ref -7.7–1.9)
BASE EXCESS BLDV CALC-SCNC: 4.1 MMOL/L (ref -7.7–1.9)
BASE EXCESS BLDV CALC-SCNC: 6.5 MMOL/L (ref -7.7–1.9)
BASE EXCESS BLDV CALC-SCNC: 7.2 MMOL/L (ref -7.7–1.9)
BASOPHILS # BLD MANUAL: 0 10E3/UL (ref 0–0.2)
BASOPHILS NFR BLD MANUAL: 0 %
BILIRUB DIRECT SERPL-MCNC: 0.1 MG/DL (ref 0–0.2)
BILIRUB DIRECT SERPL-MCNC: 0.2 MG/DL (ref 0–0.2)
BILIRUB SERPL-MCNC: 0.2 MG/DL (ref 0.2–1.3)
BILIRUB SERPL-MCNC: 0.2 MG/DL (ref 0.2–1.3)
BILIRUB SERPL-MCNC: 0.3 MG/DL (ref 0.2–1.3)
BILIRUB SERPL-MCNC: 0.3 MG/DL (ref 0.2–1.3)
BUN SERPL-MCNC: 63 MG/DL (ref 7–30)
BUN SERPL-MCNC: 67 MG/DL (ref 7–30)
BUN SERPL-MCNC: 73 MG/DL (ref 7–30)
CALCIUM SERPL-MCNC: 8.5 MG/DL (ref 8.5–10.1)
CALCIUM SERPL-MCNC: 8.5 MG/DL (ref 8.5–10.1)
CALCIUM SERPL-MCNC: 8.7 MG/DL (ref 8.5–10.1)
CHLORIDE BLD-SCNC: 109 MMOL/L (ref 94–109)
CHLORIDE BLD-SCNC: 110 MMOL/L (ref 94–109)
CHLORIDE BLD-SCNC: 111 MMOL/L (ref 94–109)
CO2 SERPL-SCNC: 30 MMOL/L (ref 20–32)
CO2 SERPL-SCNC: 30 MMOL/L (ref 20–32)
CO2 SERPL-SCNC: 32 MMOL/L (ref 20–32)
CREAT SERPL-MCNC: 1.17 MG/DL (ref 0.66–1.25)
CREAT SERPL-MCNC: 1.31 MG/DL (ref 0.66–1.25)
CREAT SERPL-MCNC: 1.43 MG/DL (ref 0.66–1.25)
CRYPTOC AG SPEC QL: NEGATIVE
DIASTOLIC BLOOD PRESSURE - MUSE: NORMAL MMHG
EOSINOPHIL # BLD MANUAL: 0 10E3/UL (ref 0–0.7)
EOSINOPHIL NFR BLD MANUAL: 0 %
ERYTHROCYTE [DISTWIDTH] IN BLOOD BY AUTOMATED COUNT: 17.2 % (ref 10–15)
GFR SERPL CREATININE-BSD FRML MDRD: 54 ML/MIN/1.73M2
GFR SERPL CREATININE-BSD FRML MDRD: 60 ML/MIN/1.73M2
GFR SERPL CREATININE-BSD FRML MDRD: 69 ML/MIN/1.73M2
GLUCOSE BLD-MCNC: 126 MG/DL (ref 70–99)
GLUCOSE BLD-MCNC: 126 MG/DL (ref 70–99)
GLUCOSE BLD-MCNC: 132 MG/DL (ref 70–99)
GLUCOSE BLDC GLUCOMTR-MCNC: 111 MG/DL (ref 70–99)
GLUCOSE BLDC GLUCOMTR-MCNC: 118 MG/DL (ref 70–99)
GLUCOSE BLDC GLUCOMTR-MCNC: 118 MG/DL (ref 70–99)
GLUCOSE BLDC GLUCOMTR-MCNC: 120 MG/DL (ref 70–99)
GLUCOSE BLDC GLUCOMTR-MCNC: 131 MG/DL (ref 70–99)
GLUCOSE BLDC GLUCOMTR-MCNC: 136 MG/DL (ref 70–99)
GLUCOSE BLDC GLUCOMTR-MCNC: 137 MG/DL (ref 70–99)
GLUCOSE BLDC GLUCOMTR-MCNC: 138 MG/DL (ref 70–99)
GLUCOSE BLDC GLUCOMTR-MCNC: 138 MG/DL (ref 70–99)
GLUCOSE BLDC GLUCOMTR-MCNC: 141 MG/DL (ref 70–99)
GLUCOSE BLDC GLUCOMTR-MCNC: 147 MG/DL (ref 70–99)
GLUCOSE BLDC GLUCOMTR-MCNC: 150 MG/DL (ref 70–99)
GLUCOSE BLDC GLUCOMTR-MCNC: 155 MG/DL (ref 70–99)
GLUCOSE BLDC GLUCOMTR-MCNC: 156 MG/DL (ref 70–99)
GLUCOSE BLDC GLUCOMTR-MCNC: 159 MG/DL (ref 70–99)
GLUCOSE BLDC GLUCOMTR-MCNC: 161 MG/DL (ref 70–99)
GLUCOSE BLDC GLUCOMTR-MCNC: 168 MG/DL (ref 70–99)
GLUCOSE BLDC GLUCOMTR-MCNC: 173 MG/DL (ref 70–99)
GLUCOSE BLDC GLUCOMTR-MCNC: 177 MG/DL (ref 70–99)
GLUCOSE BLDC GLUCOMTR-MCNC: 197 MG/DL (ref 70–99)
GLUCOSE BLDC GLUCOMTR-MCNC: 203 MG/DL (ref 70–99)
GLUCOSE BLDC GLUCOMTR-MCNC: 231 MG/DL (ref 70–99)
GLUCOSE BLDC GLUCOMTR-MCNC: 94 MG/DL (ref 70–99)
HCO3 BLD-SCNC: 24 MMOL/L (ref 21–28)
HCO3 BLD-SCNC: 26 MMOL/L (ref 21–28)
HCO3 BLD-SCNC: 26 MMOL/L (ref 21–28)
HCO3 BLD-SCNC: 27 MMOL/L (ref 21–28)
HCO3 BLD-SCNC: 30 MMOL/L (ref 21–28)
HCO3 BLD-SCNC: 30 MMOL/L (ref 21–28)
HCO3 BLDV-SCNC: 25 MMOL/L (ref 21–28)
HCO3 BLDV-SCNC: 28 MMOL/L (ref 21–28)
HCO3 BLDV-SCNC: 29 MMOL/L (ref 21–28)
HCO3 BLDV-SCNC: 31 MMOL/L (ref 21–28)
HCO3 BLDV-SCNC: 32 MMOL/L (ref 21–28)
HCT VFR BLD AUTO: 30.2 % (ref 40–53)
HGB BLD-MCNC: 9.6 G/DL (ref 13.3–17.7)
INR PPP: 1.27 (ref 0.85–1.15)
INR PPP: 1.41 (ref 0.85–1.15)
INTERPRETATION ECG - MUSE: NORMAL
LACTATE SERPL-SCNC: 0.5 MMOL/L (ref 0.7–2)
LACTATE SERPL-SCNC: 0.9 MMOL/L (ref 0.7–2)
LACTATE SERPL-SCNC: 1.1 MMOL/L (ref 0.7–2)
LACTATE SERPL-SCNC: 1.2 MMOL/L (ref 0.7–2)
LACTATE SERPL-SCNC: 1.4 MMOL/L (ref 0.7–2)
LACTATE SERPL-SCNC: 1.5 MMOL/L (ref 0.7–2)
LVEF ECHO: NORMAL
LYMPHOCYTES # BLD MANUAL: 0.2 10E3/UL (ref 0.8–5.3)
LYMPHOCYTES NFR BLD MANUAL: 1 %
MAGNESIUM SERPL-MCNC: 2 MG/DL (ref 1.6–2.3)
MAGNESIUM SERPL-MCNC: 2.4 MG/DL (ref 1.6–2.3)
MCH RBC QN AUTO: 29.9 PG (ref 26.5–33)
MCHC RBC AUTO-ENTMCNC: 31.8 G/DL (ref 31.5–36.5)
MCV RBC AUTO: 94 FL (ref 78–100)
MONOCYTES # BLD MANUAL: 1.1 10E3/UL (ref 0–1.3)
MONOCYTES NFR BLD MANUAL: 5 %
NEUTROPHILS # BLD MANUAL: 20 10E3/UL (ref 1.6–8.3)
NEUTROPHILS NFR BLD MANUAL: 94 %
O2/TOTAL GAS SETTING VFR VENT: 50 %
OXYHGB MFR BLDV: 65 % (ref 70–75)
OXYHGB MFR BLDV: 65 % (ref 70–75)
OXYHGB MFR BLDV: 66 % (ref 70–75)
OXYHGB MFR BLDV: 72 % (ref 70–75)
OXYHGB MFR BLDV: 73 % (ref 70–75)
P AXIS - MUSE: 73 DEGREES
PATH INTERP SPEC-IMP: NORMAL
PCO2 BLD: 32 MM HG (ref 35–45)
PCO2 BLD: 34 MM HG (ref 35–45)
PCO2 BLD: 39 MM HG (ref 35–45)
PCO2 BLD: 39 MM HG (ref 35–45)
PCO2 BLD: 40 MM HG (ref 35–45)
PCO2 BLD: 40 MM HG (ref 35–45)
PCO2 BLDV: 36 MM HG (ref 40–50)
PCO2 BLDV: 45 MM HG (ref 40–50)
PCO2 BLDV: 46 MM HG (ref 40–50)
PCO2 BLDV: 46 MM HG (ref 40–50)
PCO2 BLDV: 54 MM HG (ref 40–50)
PH BLD: 7.43 [PH] (ref 7.35–7.45)
PH BLD: 7.44 [PH] (ref 7.35–7.45)
PH BLD: 7.48 [PH] (ref 7.35–7.45)
PH BLD: 7.49 [PH] (ref 7.35–7.45)
PH BLDV: 7.33 [PH] (ref 7.32–7.43)
PH BLDV: 7.42 [PH] (ref 7.32–7.43)
PH BLDV: 7.44 [PH] (ref 7.32–7.43)
PH BLDV: 7.45 [PH] (ref 7.32–7.43)
PH BLDV: 7.45 [PH] (ref 7.32–7.43)
PHOSPHATE SERPL-MCNC: 4 MG/DL (ref 2.5–4.5)
PHOSPHATE SERPL-MCNC: 4.1 MG/DL (ref 2.5–4.5)
PLAT MORPH BLD: ABNORMAL
PLATELET # BLD AUTO: 132 10E3/UL (ref 150–450)
PO2 BLD: 100 MM HG (ref 80–105)
PO2 BLD: 118 MM HG (ref 80–105)
PO2 BLD: 127 MM HG (ref 80–105)
PO2 BLD: 132 MM HG (ref 80–105)
PO2 BLD: 147 MM HG (ref 80–105)
PO2 BLD: 152 MM HG (ref 80–105)
PO2 BLDV: 35 MM HG (ref 25–47)
PO2 BLDV: 36 MM HG (ref 25–47)
PO2 BLDV: 36 MM HG (ref 25–47)
PO2 BLDV: 39 MM HG (ref 25–47)
PO2 BLDV: 44 MM HG (ref 25–47)
POLYCHROMASIA BLD QL SMEAR: SLIGHT
POTASSIUM BLD-SCNC: 3.7 MMOL/L (ref 3.4–5.3)
POTASSIUM BLD-SCNC: 3.7 MMOL/L (ref 3.4–5.3)
POTASSIUM BLD-SCNC: 3.8 MMOL/L (ref 3.4–5.3)
PR INTERVAL - MUSE: 142 MS
PROT SERPL-MCNC: 5.3 G/DL (ref 6.8–8.8)
PROT SERPL-MCNC: 5.7 G/DL (ref 6.8–8.8)
PROT SERPL-MCNC: 5.7 G/DL (ref 6.8–8.8)
PROT SERPL-MCNC: 5.8 G/DL (ref 6.8–8.8)
QRS DURATION - MUSE: 54 MS
QT - MUSE: 356 MS
QTC - MUSE: 466 MS
R AXIS - MUSE: 60 DEGREES
RBC # BLD AUTO: 3.21 10E6/UL (ref 4.4–5.9)
RBC MORPH BLD: ABNORMAL
SODIUM SERPL-SCNC: 145 MMOL/L (ref 133–144)
SODIUM SERPL-SCNC: 146 MMOL/L (ref 133–144)
SODIUM SERPL-SCNC: 147 MMOL/L (ref 133–144)
SYSTOLIC BLOOD PRESSURE - MUSE: NORMAL MMHG
T AXIS - MUSE: 63 DEGREES
TACROLIMUS BLD-MCNC: 22.7 UG/L (ref 5–15)
TME LAST DOSE: ABNORMAL H
TME LAST DOSE: ABNORMAL H
UFH PPP CHRO-ACNC: 0.53 IU/ML
UPPER GI ENDOSCOPY: NORMAL
VENTRICULAR RATE- MUSE: 103 BPM
WBC # BLD AUTO: 21.3 10E3/UL (ref 4–11)

## 2021-10-23 PROCEDURE — 86832 HLA CLASS I HIGH DEFIN QUAL: CPT | Performed by: PHYSICIAN ASSISTANT

## 2021-10-23 PROCEDURE — 43235 EGD DIAGNOSTIC BRUSH WASH: CPT | Performed by: INTERNAL MEDICINE

## 2021-10-23 PROCEDURE — 99291 CRITICAL CARE FIRST HOUR: CPT | Mod: 25 | Performed by: PSYCHIATRY & NEUROLOGY

## 2021-10-23 PROCEDURE — 999N000185 HC STATISTIC TRANSPORT TIME EA 15 MIN

## 2021-10-23 PROCEDURE — 85610 PROTHROMBIN TIME: CPT | Performed by: INTERNAL MEDICINE

## 2021-10-23 PROCEDURE — 250N000012 HC RX MED GY IP 250 OP 636 PS 637: Performed by: INTERNAL MEDICINE

## 2021-10-23 PROCEDURE — 250N000009 HC RX 250: Performed by: THORACIC SURGERY (CARDIOTHORACIC VASCULAR SURGERY)

## 2021-10-23 PROCEDURE — 250N000013 HC RX MED GY IP 250 OP 250 PS 637: Performed by: THORACIC SURGERY (CARDIOTHORACIC VASCULAR SURGERY)

## 2021-10-23 PROCEDURE — 95714 VEEG EA 12-26 HR UNMNTR: CPT

## 2021-10-23 PROCEDURE — 93325 DOPPLER ECHO COLOR FLOW MAPG: CPT | Mod: 26 | Performed by: INTERNAL MEDICINE

## 2021-10-23 PROCEDURE — 250N000009 HC RX 250: Performed by: STUDENT IN AN ORGANIZED HEALTH CARE EDUCATION/TRAINING PROGRAM

## 2021-10-23 PROCEDURE — 84155 ASSAY OF PROTEIN SERUM: CPT | Performed by: INTERNAL MEDICINE

## 2021-10-23 PROCEDURE — 85014 HEMATOCRIT: CPT | Performed by: STUDENT IN AN ORGANIZED HEALTH CARE EDUCATION/TRAINING PROGRAM

## 2021-10-23 PROCEDURE — 83735 ASSAY OF MAGNESIUM: CPT | Performed by: INTERNAL MEDICINE

## 2021-10-23 PROCEDURE — C9113 INJ PANTOPRAZOLE SODIUM, VIA: HCPCS | Performed by: ANESTHESIOLOGY

## 2021-10-23 PROCEDURE — 99291 CRITICAL CARE FIRST HOUR: CPT | Mod: 24 | Performed by: ANESTHESIOLOGY

## 2021-10-23 PROCEDURE — 250N000011 HC RX IP 250 OP 636: Performed by: THORACIC SURGERY (CARDIOTHORACIC VASCULAR SURGERY)

## 2021-10-23 PROCEDURE — 258N000003 HC RX IP 258 OP 636: Performed by: THORACIC SURGERY (CARDIOTHORACIC VASCULAR SURGERY)

## 2021-10-23 PROCEDURE — 74018 RADEX ABDOMEN 1 VIEW: CPT | Mod: 26 | Performed by: STUDENT IN AN ORGANIZED HEALTH CARE EDUCATION/TRAINING PROGRAM

## 2021-10-23 PROCEDURE — 94640 AIRWAY INHALATION TREATMENT: CPT | Mod: 76

## 2021-10-23 PROCEDURE — 36415 COLL VENOUS BLD VENIPUNCTURE: CPT | Performed by: THORACIC SURGERY (CARDIOTHORACIC VASCULAR SURGERY)

## 2021-10-23 PROCEDURE — 258N000003 HC RX IP 258 OP 636: Performed by: STUDENT IN AN ORGANIZED HEALTH CARE EDUCATION/TRAINING PROGRAM

## 2021-10-23 PROCEDURE — 83605 ASSAY OF LACTIC ACID: CPT | Performed by: ANESTHESIOLOGY

## 2021-10-23 PROCEDURE — 250N000011 HC RX IP 250 OP 636: Performed by: ANESTHESIOLOGY

## 2021-10-23 PROCEDURE — 999N000157 HC STATISTIC RCP TIME EA 10 MIN

## 2021-10-23 PROCEDURE — 85520 HEPARIN ASSAY: CPT | Performed by: STUDENT IN AN ORGANIZED HEALTH CARE EDUCATION/TRAINING PROGRAM

## 2021-10-23 PROCEDURE — 84100 ASSAY OF PHOSPHORUS: CPT | Performed by: INTERNAL MEDICINE

## 2021-10-23 PROCEDURE — 70553 MRI BRAIN STEM W/O & W/DYE: CPT

## 2021-10-23 PROCEDURE — 80197 ASSAY OF TACROLIMUS: CPT | Performed by: NURSE PRACTITIONER

## 2021-10-23 PROCEDURE — 87899 AGENT NOS ASSAY W/OPTIC: CPT | Performed by: INTERNAL MEDICINE

## 2021-10-23 PROCEDURE — 80048 BASIC METABOLIC PNL TOTAL CA: CPT | Performed by: INTERNAL MEDICINE

## 2021-10-23 PROCEDURE — 82803 BLOOD GASES ANY COMBINATION: CPT | Performed by: INTERNAL MEDICINE

## 2021-10-23 PROCEDURE — 250N000013 HC RX MED GY IP 250 OP 250 PS 637: Performed by: STUDENT IN AN ORGANIZED HEALTH CARE EDUCATION/TRAINING PROGRAM

## 2021-10-23 PROCEDURE — 74018 RADEX ABDOMEN 1 VIEW: CPT | Mod: 26 | Performed by: RADIOLOGY

## 2021-10-23 PROCEDURE — 250N000013 HC RX MED GY IP 250 OP 250 PS 637: Performed by: ANESTHESIOLOGY

## 2021-10-23 PROCEDURE — 99233 SBSQ HOSP IP/OBS HIGH 50: CPT | Mod: 24 | Performed by: INTERNAL MEDICINE

## 2021-10-23 PROCEDURE — 93321 DOPPLER ECHO F-UP/LMTD STD: CPT | Mod: 26 | Performed by: INTERNAL MEDICINE

## 2021-10-23 PROCEDURE — A9585 GADOBUTROL INJECTION: HCPCS | Performed by: THORACIC SURGERY (CARDIOTHORACIC VASCULAR SURGERY)

## 2021-10-23 PROCEDURE — 200N000002 HC R&B ICU UMMC

## 2021-10-23 PROCEDURE — 82805 BLOOD GASES W/O2 SATURATION: CPT | Performed by: INTERNAL MEDICINE

## 2021-10-23 PROCEDURE — 31645 BRNCHSC W/THER ASPIR 1ST: CPT | Performed by: INTERNAL MEDICINE

## 2021-10-23 PROCEDURE — 95720 EEG PHY/QHP EA INCR W/VEEG: CPT | Performed by: PSYCHIATRY & NEUROLOGY

## 2021-10-23 PROCEDURE — 258N000001 HC RX 258: Performed by: ANESTHESIOLOGY

## 2021-10-23 PROCEDURE — 250N000009 HC RX 250: Performed by: NURSE PRACTITIONER

## 2021-10-23 PROCEDURE — 999N000065 XR ABDOMEN PORT 1 VIEWS

## 2021-10-23 PROCEDURE — 94003 VENT MGMT INPAT SUBQ DAY: CPT

## 2021-10-23 PROCEDURE — 80053 COMPREHEN METABOLIC PANEL: CPT | Performed by: INTERNAL MEDICINE

## 2021-10-23 PROCEDURE — 94668 MNPJ CHEST WALL SBSQ: CPT

## 2021-10-23 PROCEDURE — 87040 BLOOD CULTURE FOR BACTERIA: CPT | Performed by: THORACIC SURGERY (CARDIOTHORACIC VASCULAR SURGERY)

## 2021-10-23 PROCEDURE — 86833 HLA CLASS II HIGH DEFIN QUAL: CPT | Performed by: PHYSICIAN ASSISTANT

## 2021-10-23 PROCEDURE — 93325 DOPPLER ECHO COLOR FLOW MAPG: CPT

## 2021-10-23 PROCEDURE — 255N000002 HC RX 255 OP 636: Performed by: THORACIC SURGERY (CARDIOTHORACIC VASCULAR SURGERY)

## 2021-10-23 PROCEDURE — 0DJ08ZZ INSPECTION OF UPPER INTESTINAL TRACT, VIA NATURAL OR ARTIFICIAL OPENING ENDOSCOPIC: ICD-10-PCS | Performed by: INTERNAL MEDICINE

## 2021-10-23 PROCEDURE — 250N000011 HC RX IP 250 OP 636: Performed by: STUDENT IN AN ORGANIZED HEALTH CARE EDUCATION/TRAINING PROGRAM

## 2021-10-23 PROCEDURE — 71045 X-RAY EXAM CHEST 1 VIEW: CPT | Mod: 26 | Performed by: STUDENT IN AN ORGANIZED HEALTH CARE EDUCATION/TRAINING PROGRAM

## 2021-10-23 PROCEDURE — 250N000012 HC RX MED GY IP 250 OP 636 PS 637: Performed by: NURSE PRACTITIONER

## 2021-10-23 PROCEDURE — 31622 DX BRONCHOSCOPE/WASH: CPT

## 2021-10-23 PROCEDURE — 99223 1ST HOSP IP/OBS HIGH 75: CPT | Performed by: INTERNAL MEDICINE

## 2021-10-23 PROCEDURE — 250N000012 HC RX MED GY IP 250 OP 636 PS 637: Performed by: PHYSICIAN ASSISTANT

## 2021-10-23 PROCEDURE — 93308 TTE F-UP OR LMTD: CPT | Mod: 26 | Performed by: INTERNAL MEDICINE

## 2021-10-23 PROCEDURE — 71045 X-RAY EXAM CHEST 1 VIEW: CPT

## 2021-10-23 PROCEDURE — 250N000009 HC RX 250: Performed by: ANESTHESIOLOGY

## 2021-10-23 PROCEDURE — 0WCQ8ZZ EXTIRPATION OF MATTER FROM RESPIRATORY TRACT, VIA NATURAL OR ARTIFICIAL OPENING ENDOSCOPIC: ICD-10-PCS | Performed by: INTERNAL MEDICINE

## 2021-10-23 PROCEDURE — 250N000013 HC RX MED GY IP 250 OP 250 PS 637: Performed by: NURSE PRACTITIONER

## 2021-10-23 PROCEDURE — 999N000065 XR ABDOMEN 1 VIEW

## 2021-10-23 PROCEDURE — 84450 TRANSFERASE (AST) (SGOT): CPT | Performed by: INTERNAL MEDICINE

## 2021-10-23 PROCEDURE — 70553 MRI BRAIN STEM W/O & W/DYE: CPT | Mod: 26 | Performed by: RADIOLOGY

## 2021-10-23 RX ORDER — ISOSORBIDE DINITRATE 10 MG/1
20 TABLET ORAL
Status: DISCONTINUED | OUTPATIENT
Start: 2021-10-23 | End: 2021-10-24

## 2021-10-23 RX ORDER — HYDRALAZINE HYDROCHLORIDE 50 MG/1
50 TABLET, FILM COATED ORAL EVERY 8 HOURS SCHEDULED
Status: DISCONTINUED | OUTPATIENT
Start: 2021-10-23 | End: 2021-10-24

## 2021-10-23 RX ORDER — BUMETANIDE 0.25 MG/ML
2 INJECTION INTRAMUSCULAR; INTRAVENOUS
Status: DISCONTINUED | OUTPATIENT
Start: 2021-10-23 | End: 2021-10-24

## 2021-10-23 RX ORDER — POTASSIUM CHLORIDE 1.5 G/1.58G
40 POWDER, FOR SOLUTION ORAL ONCE
Status: COMPLETED | OUTPATIENT
Start: 2021-10-23 | End: 2021-10-23

## 2021-10-23 RX ORDER — POLYETHYLENE GLYCOL 3350 17 G/17G
17 POWDER, FOR SOLUTION ORAL DAILY
Status: DISCONTINUED | OUTPATIENT
Start: 2021-10-24 | End: 2021-10-29

## 2021-10-23 RX ORDER — POTASSIUM CHLORIDE 7.45 MG/ML
10 INJECTION INTRAVENOUS
Status: COMPLETED | OUTPATIENT
Start: 2021-10-23 | End: 2021-10-23

## 2021-10-23 RX ORDER — ROSUVASTATIN CALCIUM 10 MG/1
10 TABLET, COATED ORAL DAILY
Status: DISCONTINUED | OUTPATIENT
Start: 2021-10-23 | End: 2021-12-13 | Stop reason: HOSPADM

## 2021-10-23 RX ORDER — HYDRALAZINE HYDROCHLORIDE 25 MG/1
25 TABLET, FILM COATED ORAL ONCE
Status: DISCONTINUED | OUTPATIENT
Start: 2021-10-23 | End: 2021-10-24

## 2021-10-23 RX ORDER — HEPARIN SODIUM 10000 [USP'U]/100ML
0-5000 INJECTION, SOLUTION INTRAVENOUS CONTINUOUS
Status: DISPENSED | OUTPATIENT
Start: 2021-10-23 | End: 2021-10-29

## 2021-10-23 RX ORDER — GADOBUTROL 604.72 MG/ML
0.1 INJECTION INTRAVENOUS ONCE
Status: COMPLETED | OUTPATIENT
Start: 2021-10-23 | End: 2021-10-23

## 2021-10-23 RX ADMIN — ACETYLCYSTEINE 2 ML: 200 SOLUTION ORAL; RESPIRATORY (INHALATION) at 08:18

## 2021-10-23 RX ADMIN — PREDNISOLONE 17.5 MG: 15 SOLUTION ORAL at 08:15

## 2021-10-23 RX ADMIN — POTASSIUM CHLORIDE 10 MEQ: 7.46 INJECTION, SOLUTION INTRAVENOUS at 05:16

## 2021-10-23 RX ADMIN — ACETYLCYSTEINE 2 ML: 200 SOLUTION ORAL; RESPIRATORY (INHALATION) at 19:56

## 2021-10-23 RX ADMIN — AMIODARONE HYDROCHLORIDE 400 MG: 200 TABLET ORAL at 08:14

## 2021-10-23 RX ADMIN — NYSTATIN 1000000 UNITS: 500000 SUSPENSION ORAL at 08:14

## 2021-10-23 RX ADMIN — HEPARIN SODIUM 5000 UNITS: 10000 INJECTION, SOLUTION INTRAVENOUS; SUBCUTANEOUS at 11:55

## 2021-10-23 RX ADMIN — POTASSIUM CHLORIDE 10 MEQ: 7.46 INJECTION, SOLUTION INTRAVENOUS at 06:25

## 2021-10-23 RX ADMIN — LEVOTHYROXINE SODIUM 25 MCG: 0.03 TABLET ORAL at 08:14

## 2021-10-23 RX ADMIN — NICARDIPINE HYDROCHLORIDE 15 MG/HR: 2.5 INJECTION INTRAVENOUS at 01:43

## 2021-10-23 RX ADMIN — AMLODIPINE BESYLATE 10 MG: 10 TABLET ORAL at 08:14

## 2021-10-23 RX ADMIN — MYCOPHENOLATE MOFETIL 1500 MG: 200 POWDER, FOR SUSPENSION ORAL at 20:50

## 2021-10-23 RX ADMIN — PANTOPRAZOLE SODIUM 40 MG: 40 INJECTION, POWDER, FOR SOLUTION INTRAVENOUS at 16:26

## 2021-10-23 RX ADMIN — POTASSIUM CHLORIDE 40 MEQ: 1.5 POWDER, FOR SOLUTION ORAL at 23:25

## 2021-10-23 RX ADMIN — AMIODARONE HYDROCHLORIDE 400 MG: 200 TABLET ORAL at 20:50

## 2021-10-23 RX ADMIN — Medication 50 MCG: at 14:16

## 2021-10-23 RX ADMIN — ESMOLOL HYDROCHLORIDE 50 MCG/KG/MIN: 20 INJECTION INTRAVENOUS at 20:57

## 2021-10-23 RX ADMIN — Medication 1 PACKET: at 20:51

## 2021-10-23 RX ADMIN — MYCOPHENOLATE MOFETIL 1500 MG: 200 POWDER, FOR SUSPENSION ORAL at 08:14

## 2021-10-23 RX ADMIN — HEPARIN SODIUM 900 UNITS/HR: 10000 INJECTION, SOLUTION INTRAVENOUS at 13:28

## 2021-10-23 RX ADMIN — HUMAN INSULIN 6 UNITS/HR: 100 INJECTION, SOLUTION SUBCUTANEOUS at 20:21

## 2021-10-23 RX ADMIN — ACETYLCYSTEINE 2 ML: 200 SOLUTION ORAL; RESPIRATORY (INHALATION) at 16:00

## 2021-10-23 RX ADMIN — ESMOLOL HYDROCHLORIDE 100 MCG/KG/MIN: 20 INJECTION INTRAVENOUS at 03:55

## 2021-10-23 RX ADMIN — MULTIVIT AND MINERALS-FERROUS GLUCONATE 9 MG IRON/15 ML ORAL LIQUID 15 ML: at 08:14

## 2021-10-23 RX ADMIN — Medication 50 MCG: at 11:44

## 2021-10-23 RX ADMIN — Medication 25 MCG: at 18:54

## 2021-10-23 RX ADMIN — ACETYLCYSTEINE 2 ML: 200 SOLUTION ORAL; RESPIRATORY (INHALATION) at 11:34

## 2021-10-23 RX ADMIN — LEVALBUTEROL HYDROCHLORIDE 1.25 MG: 1.25 SOLUTION RESPIRATORY (INHALATION) at 16:00

## 2021-10-23 RX ADMIN — Medication 50 MCG: at 09:26

## 2021-10-23 RX ADMIN — ACYCLOVIR 400 MG: 200 SUSPENSION ORAL at 20:50

## 2021-10-23 RX ADMIN — PREDNISOLONE 17.5 MG: 15 SOLUTION ORAL at 22:55

## 2021-10-23 RX ADMIN — HYDRALAZINE HYDROCHLORIDE 50 MG: 50 TABLET, FILM COATED ORAL at 16:21

## 2021-10-23 RX ADMIN — ACYCLOVIR 400 MG: 200 SUSPENSION ORAL at 08:16

## 2021-10-23 RX ADMIN — NYSTATIN 1000000 UNITS: 500000 SUSPENSION ORAL at 16:22

## 2021-10-23 RX ADMIN — BUMETANIDE 2 MG: 0.25 INJECTION, SOLUTION INTRAMUSCULAR; INTRAVENOUS at 09:15

## 2021-10-23 RX ADMIN — POTASSIUM CHLORIDE 20 MEQ: 40 SOLUTION ORAL at 08:14

## 2021-10-23 RX ADMIN — GADOBUTROL 6.8 ML: 604.72 INJECTION INTRAVENOUS at 18:15

## 2021-10-23 RX ADMIN — BUMETANIDE 2 MG: 0.25 INJECTION, SOLUTION INTRAMUSCULAR; INTRAVENOUS at 16:22

## 2021-10-23 RX ADMIN — NICARDIPINE HYDROCHLORIDE 2.5 MG/HR: 2.5 INJECTION INTRAVENOUS at 22:19

## 2021-10-23 RX ADMIN — ESMOLOL HYDROCHLORIDE 100 MCG/KG/MIN: 20 INJECTION INTRAVENOUS at 08:17

## 2021-10-23 RX ADMIN — DEXTROSE MONOHYDRATE 1000 ML: 100 INJECTION, SOLUTION INTRAVENOUS at 00:22

## 2021-10-23 RX ADMIN — HYDRALAZINE HYDROCHLORIDE 50 MG: 50 TABLET, FILM COATED ORAL at 22:54

## 2021-10-23 RX ADMIN — Medication 1 PACKET: at 08:13

## 2021-10-23 RX ADMIN — HEPARIN SODIUM 5000 UNITS: 10000 INJECTION, SOLUTION INTRAVENOUS; SUBCUTANEOUS at 03:55

## 2021-10-23 RX ADMIN — ACETAZOLAMIDE 500 MG: 250 TABLET ORAL at 08:16

## 2021-10-23 RX ADMIN — ISOSORBIDE DINITRATE 20 MG: 10 TABLET ORAL at 20:23

## 2021-10-23 RX ADMIN — DEXTROSE MONOHYDRATE 1000 ML: 100 INJECTION, SOLUTION INTRAVENOUS at 23:23

## 2021-10-23 RX ADMIN — LEVALBUTEROL HYDROCHLORIDE 1.25 MG: 1.25 SOLUTION RESPIRATORY (INHALATION) at 11:34

## 2021-10-23 RX ADMIN — ESMOLOL HYDROCHLORIDE 100 MCG/KG/MIN: 20 INJECTION INTRAVENOUS at 13:12

## 2021-10-23 RX ADMIN — LEVALBUTEROL HYDROCHLORIDE 1.25 MG: 1.25 SOLUTION RESPIRATORY (INHALATION) at 19:56

## 2021-10-23 RX ADMIN — LEVALBUTEROL HYDROCHLORIDE 1.25 MG: 1.25 SOLUTION RESPIRATORY (INHALATION) at 08:18

## 2021-10-23 RX ADMIN — MICAFUNGIN SODIUM 100 MG: 50 INJECTION, POWDER, LYOPHILIZED, FOR SOLUTION INTRAVENOUS at 08:54

## 2021-10-23 RX ADMIN — HYDRALAZINE HYDROCHLORIDE 25 MG: 25 TABLET, FILM COATED ORAL at 05:16

## 2021-10-23 RX ADMIN — PANTOPRAZOLE SODIUM 40 MG: 40 INJECTION, POWDER, FOR SOLUTION INTRAVENOUS at 08:13

## 2021-10-23 RX ADMIN — CEFTAZIDIME 2 G: 2 INJECTION, POWDER, FOR SOLUTION INTRAVENOUS at 09:58

## 2021-10-23 RX ADMIN — NYSTATIN 1000000 UNITS: 500000 SUSPENSION ORAL at 20:50

## 2021-10-23 RX ADMIN — TACROLIMUS 4 MG: 5 CAPSULE ORAL at 08:14

## 2021-10-23 ASSESSMENT — ACTIVITIES OF DAILY LIVING (ADL)
ADLS_ACUITY_SCORE: 20
ADLS_ACUITY_SCORE: 18
ADLS_ACUITY_SCORE: 20
ADLS_ACUITY_SCORE: 20
ADLS_ACUITY_SCORE: 18
ADLS_ACUITY_SCORE: 20
ADLS_ACUITY_SCORE: 18
ADLS_ACUITY_SCORE: 20
ADLS_ACUITY_SCORE: 20
ADLS_ACUITY_SCORE: 18
ADLS_ACUITY_SCORE: 20
ADLS_ACUITY_SCORE: 18
ADLS_ACUITY_SCORE: 20

## 2021-10-23 ASSESSMENT — MIFFLIN-ST. JEOR: SCORE: 1486

## 2021-10-23 NOTE — PROGRESS NOTES
Pulmonary Medicine  Cystic Fibrosis - Lung Transplant Team  Progress Note  10/23/2021       Patient: Edson Thornton  MRN: [id number]  : 1965 (age 56 year old)  Transplant: 10/16/2021 (Lung), POD#7  Admission date: 2021    Assessment & Plan:     Edson Thornton is a 56 year old male with a PMH significant for NSIP/ILD, bronchiectasis, moderate PH, RA, SALTY, chronic HSV infection, hypogammaglobulinemia, steroid-induced diabetes, hypothyroidism, HTN, HLD, duodenal anomaly, anxiety, and depression.  Admitted on 21 from OSH for acute on chronic respiratory failure 2/2 ILD exacerbation.  Pt. is now s/p BSLT on 10/16/21.  Surgery relatively uncomplicated but pt. with low cardiac index and PGD immediately post-op.  The patient remains intubated on Caroline in the CVICU, difficulty with weaning sedation.  Afib with RVR noted 10/18 and also with BRENNAN, improving with aggressive diuresis.     Today's recommendations:   - Bronch today.  - Agree with diuresis with bumex 2mg BID.  - CXR daily as below.  - Await explant pathology (should include mediastinal LN).  - Tacrolimus level to trend supratherapeutic, stopped, continue daily levels (ordered)  - Prednisone taper due 10/24 (ordered).  - Continue to hold Bactrim, revisit 10/25.  - CMV and EBV  (not yet ordered).  - Follow pending cultures (10/20), continue ceftazidime, defer vancomycin and antifungal coverage pending BDG fungitell/cultures.  - Coccidioides Ag next due  (not yet ordered).  - IVIG with premedications today (ordered).  - DSA ordered 10/24.  - Donor risk labs ordered 11/15.    S/p BSLT for ILD:  Acute hypoxic/hypercapneic respiratory failure: Unfortunately had not received vaccination for flu, PNA, or COVID-19 PTA (not available after admission d/t immunosuppression state).  Remains on epinephrine for low CI.  Remains intubated on CMV 22/400/10/50 with Caroline at 4, cisatracurium weaned off 10/19, difficulty weaning sedation due to hypertension  and vent dyssynchrony.  POD #1 CXR with new RLL focal opacity and marked increase in diffuse interstitial opacities concerning for pulmonary edema.  Started on Bumex drip and ABX resumed as below 10/19 given intermittent fevers.  Bronch 10/20 with tan secretions to LLL, repeat on 10/23/2021 with frothy clear secretions in Rt. TB tree.   - Nebs: levalbuterol and Mucomyst QID  - Aggressive pulmonary toilet with chest physiotherapy QID (addition of Aerobika and incentive spirometry once extubated)  - Ventilator and Caroline management per ICU team  - Anesthesia to place erector spinae catheters prior to anticipated extubation per our routine, deferred today  - Chest tubes managed by surgical team.  - Agree with ongoing diuresis as tolerated.  - CXR today with decreased bilateral interstitial and airspace opacities.  - Defer chest CT non-contrast for now pending clinical course  - S/p NJ feeding tube placement by radiology 10/18, TF management per RD and ICU team  - Recommend SLP consult as pt. nears extubation for swallowing evaluation  - Await explant pathology (should include mediastinal LN)     Immunosuppression:  - Induction therapy with basiliximab (and high dose IV steroid) given intraoperatively with repeat basiliximab POD#4  - Tacrolimus level is 22.7 (10/23/2021). Goal level 8-12. HOld tacrolimus. We will repeat level daily for now (ordered).  - MMF 1500 mg BID (on PTA, AZA to be avoided given TPMT)  - Prednisolone 17.5 mg BID with next taper on 10/24 (ordered) per lung transplant protocol:  Date AM dose (mg) PM dose (mg)   10/17/21 17.5 17.5   10/24/21 15 15   10/31/21 15 12.5   11/7/21 12.5 12.5   11/21/21 12.5 10   12/5/21 10 10   1/2/22 10 7.5   1/30/22 7.5 7.5   2/27/22 7.5 5   3/27/22 5 5   4/24/22 5 2.5      Prophylaxis:   - Bactrim for PJP ppx deferred post-op pending assessment of renal function, revisit 10/25  - Nystatin for oral candidiasis ppx, 6 month course  - See below for serologies and viral  ppx:    Donor Recipient Intervention   CMV status Negative Negative None   EBV status Positive Positive N/A, EBV monthly (11/16, not yet ordered)   HSV status N/A Positive Acyclovir POD #1-30   (recent infection history)      Primary graft dysfunction (per ISHLT guidelines):  See chart below:    POD #0  (~0 hours) POD #1  (~24 hours) POD #2   (~48 hours) POD#3   (~72 hours)   Date 10/16 10/17 10/18 10/19   Time 1536 1537  1629 1559   Intubated Y Y Y Y   PaO2 227 108  116  84   FiO2 100 100  60  50   P/F Ratio 227 108  193  168   PGD Grade   (0=mild, 3=severe) 2 3  3  3   ECMO N N N N   Inhaled NO/Flolan Y Y Y Y   ISHLT PGD Scoring  Grade 0 - PaO2/FiO2 >300 and normal chest radiograph (absence of diffuse allograft infiltrates)  Grade 1 - PaO2/FiO2 >300 and diffuse allograft infiltrates on chest radiograph  Grade 2 - PaO2/FiO2 between 200 and 300  Grade 3 - PaO2/FiO2 <200 and/or on ECMO     ID: Concern for possible Strongyloides exposure pre-transplant s/p ivermectin x1 dose (9/17).  Fevers post-op, Tmax 101.7 POD #1.  S/p IV ceftaz/vancomycin for 48h per protocol.  Febrile again overnight 10/19 to 10/20.  - Donor cultures NGTD (UNOS data personally reviewed this morning)  - Recipient cultures NGTD (including fungal and AFB)   - Blood cultures (10/7, 10/17, 10/22 and 10/23) NGTD  - Bronch cultures (10/17) with Staph epidermidis and Candida albicans, repeat (10/20) with GPC and yeast (cytology pending)  - Sputum culture (10/17) with normal jean marie, repeat (10/20) with GPC and Candida  - Monthly Coccidioides Ag x12 months post-transplant per ID (urine and serum negative 10/17), due 11/17 (not yet ordered)  - ABX: ceftazidime (resumed 10/19 given fevers), defer vancomycin and antifungal coverage pending BDG fungitell/cultures  -      Candidemia: 1 of 2 blood cultures positive from 10/20.   Fungitell positive on 10/20 at 399.  Bronch cultures and sputum cultures positive for Candida.  10/23/2021: Appr ID input. Started on  IV Micafungin. Ophthalmology consult pending.    - Will do line holiday hopefully tomorrow.    - Echo to repeat when able to do TC to assess for endocarditis.      HSV: Chronic intermittent active infection pre-transplant with recent HSV infection: crusted lesions throughout left side of jaw, s/p 10 day treatment course of ACV through 10/9.  HSV PCR blood negative 10/17.  - ACV ppx as above (starting on POD #1 instead of POD #8 given HSV history and location)     Hypogammaglobulinemia: IgG previously low at 364 (9/7).  Noted at 265 at time of transplant (10/15).  - IVIG with premedication 10/21 (completed)     Positive cross match: Note that he received two doses of rituximab in June, which is likely contributing to cross match result.  DSA negative 10/17.  - Repeat DSA weekly x2 then q2 weeks (due 10/24, ordered)     PHS risk criteria donor:  Additional labs required post-transplant (between 4-8 weeks post-op): Hepatitis B, Hepatitis C, and HIV by VISHAL ([id number], ordered POD #30).     SALTY: Noted pre-transplant. Home CPAP 6-12 cmH2)  - Resume BiPAP at night post-extubation     Other relevant problems being managed by primary team:    #. New CVA: Pt has been unable to arouse but did move extremities on 10/21/21 in AM. Head CTA shows new multiple CVA, neurology input appr. EEG is neg for seizures (10/22/2021).  - Etiology:? Surgery vs. embolic vs. infectious.      #. GI bleed: Hgb dropped by ~2g (10/22/2021). GI bleed noted by OG.  - GI Consulted, EGD (10/23/2021) shows only NG trauma. PPI IV BID recommended.     Afib with RVR: Noted 10/18, started on amiodarone drip.  Currently in sinus rhythm.    - Management per ICU team     Elevated LFTs: Possible shock liver post-op.  Initial ALT//230 evening of surgery, then with marked increase to 1550/1246 POD #1.  Gradual improvement.  - Daily LFTs     BRENNAN: Baseline creatinine 0.7, increased to 1.2 POD #1 and further increased to 2.05 POD #2.  S/p 3.5 L volume  "resuscitation the first 12 hours post-op for rising lactic acid (peak 9).  UO also downtrending.  Improving with aggressive diuresis.  - Bactrim held as above  - Management per ICU team    We appreciate the excellent care provided by the CVICU and CVTS teams.  Recommendations communicated via in person rounding and this note.  Will continue to follow along closely, please do not hesitate to call with any questions or concerns.     Subjective & Interval History:     Pt is opening eyes but not following any commands. Not much GI bleed.     Review of Systems:     ROS as above otherwise limited due to intubation and sedation.    Physical Exam:     Vital signs:  Temp: 98.1  F (36.7  C) Temp src: Bladder   Pulse: 80   Resp: 23 SpO2: 98 % O2 Device: Mechanical Ventilator Oxygen Delivery: 15 LPM Height: 162.6 cm (5' 4\") Weight: 74.5 kg (164 lb 3.9 oz)  I/O:     Intake/Output Summary (Last 24 hours) at 10/23/2021 1602  Last data filed at 10/23/2021 1600  Gross per 24 hour   Intake 4041.51 ml   Output 3145 ml   Net 896.51 ml         Constitutional: intubated, in no apparent distress.   HEENT: Eyes with pink conjunctivae, anicteric.  Orally intubated via ETT.  Nasal FT.  PULM: Mildly diminished air flow to bases bilaterally.  Coarse t/o.  Non-labored breathing on full vent support.  CV: Normal S1 and S2.  RRR.  No murmur, gallop, or rub.  No peripheral edema.   ABD: Hypoactive BS, soft, nondistended.    MSK: No apparent muscle wasting.   NEURO: Not following commands  SKIN: Warm, dry.  No rash on limited exam.  Clamshell incision covered with wound vac.  PSYCH: Calm.     Lines, Drains, and Devices:  PICC Triple Lumen Right  (Active)   Site Assessment WDL 10/21/21 0400   External Cath Length (cm) 0 cm 10/11/21 1140   Dressing Intervention Chlorhexidine patch;Transparent 10/21/21 0400   Dressing Change Due 10/24/21 10/21/21 0400   PICC Comment CDI 10/21/21 0400   [place]osley - Status infusing 10/21/21 0400   Gray - Cap Change Due " 10/22/21 10/21/21 0400   Red - Status infusing 10/21/21 0400   Red - Cap Change Due 10/22/21 10/21/21 0400   White - Status infusing 10/21/21 0400   White - Cap Change Due 10/22/21 10/21/21 0400   Extravasation? No 10/21/21 0400   Line Necessity Yes, meets criteria 10/21/21 0400   Number of days:        Arterial Line 10/16/21 (Active)   Site Assessment WDL 10/21/21 0400   Line Status Pulsatile blood flow 10/21/21 0400   Arterine Line Cap Change Due 10/22/21 10/21/21 0400   Art Line Waveform Appropriate 10/21/21 0400   Art Line Interventions Leveled;Connections checked and tightened;Flushed per protocol 10/21/21 0400   Color/Movement/Sensation Capillary refill less than 3 sec 10/21/21 0400   Line Necessity Yes, meets criteria 10/21/21 0400   Dressing Type Transparent 10/21/21 0400   Dressing Status Clean, dry, intact 10/21/21 0400   Dressing Intervention Dressing changed/new dressing 10/17/21 0400   Dressing Change Due 10/24/21 10/21/21 0400   Number of days: 5       CVC Double Lumen Right Internal jugular (Active)   Site Assessment WDL 10/21/21 0400   Dressing Type Chlorhexidine sponge;Transparent 10/21/21 0400   Dressing Status clean;dry;intact 10/21/21 0400   Dressing Intervention new dressing;dressing changed 10/17/21 0000   Dressing Change Due 10/24/21 10/21/21 0400   Line Necessity yes, meets criteria 10/21/21 0400   Brown - Status saline locked 10/21/21 0400   Brown - Cap Change Due 10/22/21 10/21/21 0400   White - Status infusing 10/21/21 0400   White - Cap Change Due 10/22/21 10/21/21 0400   Phlebitis Scale 0-->no symptoms 10/21/21 0400   Infiltration? no 10/21/21 0400   Infiltration Scale 0 10/20/21 1600   Infiltration Site Treatment Method  None 10/20/21 1600   CVC Comment MAC 10/21/21 0400   Number of days: 5       Pulmonary Artery Catheter - Triple Lumen 10/16/21 1500 Right internal jugular vein (Active)   Dressing dressing dry and intact 10/21/21 0400   Securement secured with sutures 10/21/21 0400   PA  Catheter Markings (cm) 47 10/21/21 0400   Phlebitis 0-->no symptoms 10/21/21 0400   Infiltration 0-->no symptoms 10/21/21 0400   Waveform normal 10/21/21 0400   Lumen A - Color WHITE 10/21/21 0400   Lumen A - Status transduced 10/21/21 0400   Lumen A - Cap Change Due 10/22/21 10/21/21 0400   Lumen B - Color YELLOW 10/21/21 0400   Lumen B - Status transduced 10/21/21 0400   Lumen C - Color BLUE 10/21/21 0400   Lumen C - Status infusing 10/21/21 0400   Lumen C - Cap Change Due 10/22/21 10/21/21 0400   Number of days: 5     Data:     LABS    CMP:   Recent Labs   Lab 10/23/21  1324 10/23/21  1222 10/23/21  1104 10/23/21  0840 10/23/21  0751 10/23/21  0640 10/23/21  0422 10/23/21  0344 10/22/21  2214 10/22/21  2120 10/22/21  1710 10/22/21  1601 10/22/21  0556 10/22/21  0407 10/21/21  1951 10/21/21  1950   NA  --   --   --   --  145*  --   --  146*  --  146*  --  147*   < > 143   < >  --    POTASSIUM  --   --   --   --  3.7  --   --  3.7  --  3.9  --  3.6   < > 4.6   < > 4.9   CHLORIDE  --   --   --   --  110*  --   --  109  --  110*  --  109   < > 102   < >  --    CO2  --   --   --   --  30  --   --  32  --  30  --  31   < > 34*   < >  --    ANIONGAP  --   --   --   --  5  --   --  5  --  6  --  7   < > 7   < >  --    * 94 138* 118* 126*   < >   < > 132*   < > 201*   < > 148*   < > 178*   < >  --    BUN  --   --   --   --  73*  --   --  67*  --  62*  --  68*   < > 73*   < >  --    CR  --   --   --   --  1.31*  --   --  1.43*  --  1.37*  --  1.47*   < > 1.67*   < >  --    GFRESTIMATED  --   --   --   --  60*  --   --  54*  --  57*  --  53*   < > 45*   < >  --    ERIK  --   --   --   --  8.7  --   --  8.5  --  7.9*  --  8.2*   < > 8.0*   < >  --    MAG  --   --   --   --   --   --   --  2.4*  --   --   --  2.1  --  2.4*  --  2.4*   PHOS  --   --   --   --   --   --   --  4.0  --   --   --  2.8  --  6.7*  --  7.5*   PROTTOTAL  --   --   --   --  5.7*  --   --  5.8*  --  5.6*  --  5.5*   < > 5.3*   < >  --    ALBUMIN   --   --   --   --  1.3*  --   --  1.3*  --  1.3*  --  1.2*   < > 1.2*   < >  --    BILITOTAL  --   --   --   --  0.3  --   --  0.3  --  0.3  --  0.3   < > 0.2   < >  --    ALKPHOS  --   --   --   --  176*  --   --  182*  --  183*  --  178*   < > 163*   < >  --    AST  --   --   --   --  25  --   --  27  --  28  --  32   < > 31   < >  --    ALT  --   --   --   --  210*  --   --  223*  --  236*  --  281*   < > 314*   < >  --     < > = values in this interval not displayed.     CBC:   Recent Labs   Lab 10/23/21  0344 10/22/21  2013 10/22/21  1407 10/22/21  0958 10/22/21  0407 10/22/21  0407 10/21/21  0354 10/21/21  0354   WBC 21.3*  --   --  20.7*  --  18.7*  --  26.5*   RBC 3.21*  --   --  2.96*  --  2.23*  --  2.77*   HGB 9.6* 9.0* 10.4* 9.1*   < > 6.6*   < > 8.3*   HCT 30.2*  --   --  28.0*  --  21.7*  --  26.7*   MCV 94  --   --  95  --  97  --  96   MCH 29.9  --   --  30.7  --  29.6  --  30.0   MCHC 31.8  --   --  32.5  --  30.4*  --  31.1*   RDW 17.2*  --   --  16.1*  --  17.0*  --  17.0*   *  --   --  112*  --  93*  --  114*    < > = values in this interval not displayed.       INR:   Recent Labs   Lab 10/23/21  0344 10/22/21  1407 10/22/21  0407 10/21/21  1332   INR 1.41* 1.28* 1.33* 1.43*       Glucose:   Recent Labs   Lab 10/23/21  1324 10/23/21  1222 10/23/21  1104 10/23/21  0840 10/23/21  0751 10/23/21  0640   * 94 138* 118* 126* 138*       Blood Gas:   Recent Labs   Lab 10/23/21  1215 10/23/21  1214 10/23/21  0751 10/23/21  0345 10/23/21  0344   PHV 7.33  --  7.45*  --  7.45*   PCO2V 54*  --  46  --  36*   PO2V 44  --  35  --  39   HCO3V 28  --  32*  --  25   LORI 1.9  --  7.2*  --  0.8   O2PER 50 50 50   < > 50    < > = values in this interval not displayed.       Culture Data No results for input(s): CULT in the last 168 hours.    Virology Data:   Lab Results   Component Value Date    FLUAH1 Negative 10/17/2021    FLUAH3 Negative 10/17/2021    WQ6423 Negative 10/17/2021    IFLUB Negative  10/17/2021    RSVA Negative 10/17/2021    RSVB Negative 10/17/2021    PIV1 Negative 10/17/2021    PIV2 Negative 10/17/2021    PIV3 Negative 10/17/2021    HMPV Negative 10/17/2021    HRVS Negative 10/17/2021    ADVBE Negative 10/17/2021    ADVC Negative 10/17/2021       Historical CMV results (last 3 of prior testing):  No results found for: CMVQNT  No results found for: CMVLOG    Urine Studies    Recent Labs   Lab Test 10/22/21  1641 10/17/21  1642   URINEPH 7.5* 5.0   NITRITE Negative Negative   LEUKEST Negative Negative   WBCU 3 25*       Most Recent [place] Pulmonary Function Testing (FVC/FEV1 only)  No results found for: 20002  No results found for: 20003  No results found for: 20015  No results found for: 20016    IMAGING    Recent Results (from the past 48 hour(s))   US Upper Extremity Venous Duplex Right Portable    Narrative    EXAMINATION: DOPPLER VENOUS ULTRASOUND OF THE RIGHT UPPER EXTREMITY,  10/19/2021 9:53 AM     COMPARISON: None.    HISTORY: Right upper extremity swelling    TECHNIQUE:  Gray-scale evaluation with compression, spectral flow and  color Doppler assessment of the deep venous system of the right upper  extremity.    FINDINGS:  Normal blood flow and waveforms are demonstrated in the internal  jugular vein and there is no evidence of echogenic thrombus within the  lumen. There is normal compressibility of the brachial, basilic and  cephalic veins.  The right innominate, subclavian, and axillary veins are obscured due  to subcutaneous emphysema.    Right internal jugular central catheter in place without evidence of  echogenic thrombus in the lumen.  Right basilic venous approach PICC line starting in the antecubital  fossa.      Impression    IMPRESSION:  1.  No sonographic evidence of left upper extremity deep venous  thrombosis.    I have personally reviewed the examination and initial interpretation  and I agree with the findings.    JODI MENDEZ MD         SYSTEM ID:  [id number]   XR  Chest Port 1 View    Narrative    Chest one view portable    HISTORY: Chest tube output increased    COMPARISON STUDY: Same day at 00 52    FINDINGS: Changes of bilateral lung transplant. Endotracheal tube,  bilateral chest tubes, clamshell sternotomy, feeding tube, NG tube,  right IJ [place] catheter unchanged. Right PICC in the SVC with tip  not well visualized from overlying catheters. Mediastinal drain in  place. Cardiac silhouette is enlarged. Interstitial opacities  bilaterally unchanged. Subcutaneous emphysema bilaterally unchanged.  Bibasilar atelectasis.      Impression    IMPRESSION: Interstitial edema and bibasilar atelectasis.    DAVIN ANGUIANO MD         SYSTEM ID:  [id number]   XR Chest Port 1 View    Narrative    EXAMINATION: XR CHEST PORT 1 VIEW, 10/20/2021 4:03 AM    INDICATION: s/p lung transplant    COMPARISON: 10/19/2021    FINDINGS: Single portable 30 degree upright AP radiograph of the  chest. ET tube projects at the mid thoracic trachea. Feeding tube  courses below the left hemidiaphragm, tip is not within view. NG/OG  side-port projects over the stomach. Right IJ [place] catheter with  tip terminating over the proximal right main pulmonary artery. Right  upper extremity PICC line, tip is obscured by [place] catheter.  Bilateral chest tubes and mediastinal drain.    Trachea is midline. The cardiomediastinal silhouette is indistinct.  Small bilateral pleural effusions. No appreciable pneumothorax.  Unchanged interstitial and airspace opacities.    Partially visualized upper abdomen is unremarkable. Subcutaneous  emphysema along the left chest wall and right base of the neck.      Impression    IMPRESSION:  1. Postsurgical changes of bilateral lung transplant.  2. Bilateral pleural effusions with overlying atelectasis. Unchanged  bilateral interstitial and airspace opacities.  3. No appreciable pneumothorax.    I have personally reviewed the examination and initial interpretation  and I  agree with the findings.    ZHANNA DONG MD         SYSTEM ID:  [id number]   XR Abdomen Port 1 View    Narrative    EXAM: XR ABDOMEN PORT 1 VIEWS  10/20/2021 11:41 AM      HISTORY: Increased OG output    COMPARISON: Abdominal radiograph 10/16/2021    FINDINGS: Portable abdominal radiograph. Gastric tube terminating over  the stomach. Enteric tube terminating over the gastric pylorus.  Nonobstructive bowel gas pattern. Multiple small radiodensities  projecting over the right colon, presumably intraluminal. No  pneumatosis. No portal venous gas. Watson catheter projecting over the  pelvis. Partially visualized mediastinal drain and right basilar chest  tube. Bibasilar opacities.      Impression    IMPRESSION:   1. Nonobstructive bowel gas pattern.  2. Feeding tube is coiled within the stomach.  3. Gastric tube tip and sidehole project over the stomach.    I have personally reviewed the examination and initial interpretation  and I agree with the findings.    FREDDY LEAHY MD         SYSTEM ID:  [id number]   XR Feeding Tube Placement    Narrative    Feeding tube placement: 10/20/2021 4:15 PM    Comparison: None    Indication: Advancement of feeding tube    Fluoroscopy time: 1.42 minutes    Technique: After injection of Xylocaine gel into the left nostril, a  feeding tube was advanced under fluoroscopic guidance.    Findings: The feeding tube was advanced with the tip in the third  portion of the duodenum. A small amount of barium was injected to  demonstrate placement within the small bowel. The feeding tube was  then flushed with normal saline. The feeding tube was secured using a  nasal bridle.      Impression    Impression: Uncomplicated feeding tube advancement with tip in the  third portion of the duodenum.    I, FITO PERALES MD, attest that I was immediately available to  provide guidance and assistance during the entirety of the procedure.  The examination was performed portable in the ICU therefore  a  radiologist was not directly present during tube placement.    I have personally reviewed the examination and initial interpretation  and I agree with the findings.    FITO PERALES MD         SYSTEM ID:  [id number]   US Upper Extremity Venous Duplex Left    Narrative    EXAMINATION: DOPPLER VENOUS ULTRASOUND OF THE LEFT UPPER EXTREMITY,  10/20/2021 4:29 PM     COMPARISON: None.    HISTORY: s/p surgery- assess for DVT. Post op fever      TECHNIQUE:  Gray-scale evaluation with compression, spectral flow and  color Doppler assessment of the deep venous system of the left upper  extremity.    FINDINGS:  Left: Normal blood flow and waveforms are demonstrated in the internal  jugular, innominate, subclavian, and axillary veins. There is normal  compressibility of the paired brachial, basilic and cephalic veins.      Impression    IMPRESSION:  No evidence of left upper extremity deep venous thrombosis.    I have personally reviewed the examination and initial interpretation  and I agree with the findings.    FREDDY LEAHY MD         SYSTEM ID:  [id number]   US Lower Extremity Venous Duplex Bilateral    Narrative    EXAMINATION: DOPPLER VENOUS ULTRASOUND OF BILATERAL LOWER EXTREMITIES,  10/20/2021 4:29 PM     COMPARISON: None.    HISTORY: To assess for DVT status post surgery. Postop fever.    TECHNIQUE:  Gray-scale evaluation with compression, spectral flow and  color Doppler assessment of the deep venous system of both legs from  groin to knee, and then at the ankles.    FINDINGS:  In both lower extremities, the common femoral, femoral, popliteal and  posterior tibial veins demonstrate normal compressibility and blood  flow.      Impression    IMPRESSION:  No evidence of deep venous thrombosis in either lower extremity.    I have personally reviewed the examination and initial interpretation  and I agree with the findings.    FREDDY LEAHY MD         SYSTEM ID:  [id number]   XR Chest Port 1 View    Narrative     EXAMINATION: XR CHEST PORT 1 VIEW, 10/21/2021 1:56 AM    INDICATION: s/p lung transplant    COMPARISON: 10/20/2021    FINDINGS: ET tube projects 1.5 cm above the shiv. Stable bilateral  chest tubes and mediastinal drain. Right IJ [place]intia catheter with  tip terminating over the proximal right main pulmonary artery. Enteric  tube courses below the left hemidiaphragm, tip is not within view.  NG/OG tube courses below the left hemidiaphragm, tip is not within  view. Right upper extremity PICC line, tip is obscured by additional  lines projecting over the mediastinum. Intact clam shell sternotomy  wires. Cutaneous staples project over the low thorax.    Postsurgical changes of bilateral lung transplant. Trachea is midline.  The cardiomediastinal silhouette is indistinct. Small bilateral  pleural effusions. Bibasilar atelectasis. No appreciable pneumothorax.  Slightly decreased bilateral interstitial and airspace opacities.    Partially visualized upper abdomen is unremarkable. No acute osseous  abnormality. Extensive subcutaneous emphysema along the left chest  wall and base of the right neck.      Impression    IMPRESSION:  1. Postsurgical changes of bilateral lung transplant.  2. Stable support devices.  3. Small bilateral pleural effusions with overlying atelectasis and  decreased bilateral interstitial and airspace opacities.  4. No appreciable pneumothorax.    I have personally reviewed the examination and initial interpretation  and I agree with the findings.    MAURICIO NAVAS MD         SYSTEM ID:  [id number]

## 2021-10-23 NOTE — PROGRESS NOTES
"Bronchoscopy Risk Assessment Guidelines      A. Patient symptoms to consider when assessing pulmonary TB risk are:    I. Cough greater than 3 weeks; and fever, hemoptysis, pleuritic chest    pain, weight loss greater than 10 lbs, night sweats, fatigue, infiltrates on    upper lobes or superior segments of lower lobes, cavitation on chest    x-ray.   B. Patient risk factors to consider when assessing pulmonary TB risk are:    I. Exposure to known TB case, foreign-born persons (within 5 years of    arrival to [place]), residence in a crowded setting (correctional facility,     long-term care center, etc.), persons with HIV or immunosuppression.    Patients with symptoms and risk factors should generally be considered \"suspect risk\" and bronchoscopies should be performed in airborne precautions.    This patient has NO KNOWN RISK of Tuberculosis (proceed with bronchoscopy)    Specimens sent: no  Complications: None  Scope used: #[id number] Slim  Attending Physician: Dr. Mike Felipe, RT on 10/23/2021 at 12:05 PM  "

## 2021-10-23 NOTE — PRE-PROCEDURE
Edson Thornton  [id number]  male  56 year old      Reason for procedure/surgery: blood in OG tube    Patient Active Problem List   Diagnosis     S/P lung transplant (H)     BRENNAN (acute kidney injury) (H)     Shock liver       Past Surgical History:    Past Surgical History:   Procedure Laterality Date     COLONOSCOPY W/ BIOPSIES AND POLYPECTOMY  07/21/2020     CV CORONARY ANGIOGRAM N/A 9/8/2021    Procedure: Coronary Angiogram with possible intervention;  Surgeon: Jovon Bullock MD;  Location:  [facility]     CV RIGHT HEART CATH MEASUREMENTS RECORDED N/A 9/8/2021    Procedure: Right Heart Cath;  Surgeon: Jovon Bullock MD;  Location:  [facility]     EXTRACTION(S) DENTAL N/A 9/22/2021    Procedure: EXTRACTION tooth #19;  Surgeon: Deepak Tobin DDS;  Location: [facility] OR     HERNIA REPAIR       right acl       TRANSPLANT LUNG RECIPIENT SINGLE X2 Bilateral 10/16/2021    Procedure: TRANSPLANT, LUNG, RECIPIENT, BILATERAL, Bronchoscopy, on-pump perfusion, bilateral clamshell sternotomy;  Surgeon: Yanick Corral MD;  Location: [facility]     XR ACROMIOCLAVICULAR JOINT BILATERAL         Past Medical History:   Past Medical History:   Diagnosis Date     BRENNAN (acute kidney injury) (H) 10/17/2021     Anxiety      Depression      HLD (hyperlipidemia)      HTN (hypertension)      Hypothyroidism      ILD (interstitial lung disease) (H)      SALTY on CPAP      Oxygen dependent     BL 4L since ~6/2021     Rheumatoid arthritis (H)     signs ~5/2020, dx 5/2021     S/P lung transplant (H) 10/16/2021     Shock liver 10/17/2021     Steroid-induced hyperglycemia      Traction bronchiectasis (H)        Social History:   Social History     Tobacco Use     Smoking status: Never Smoker     Smokeless tobacco: Never Used   Substance Use Topics     Alcohol use: Never       Family History:   Family History   Problem Relation Age of Onset     Diabetes Type 1 Mother      Heart Disease Mother      Chronic  "Obstructive Pulmonary Disease Mother      Rheumatoid Arthritis Father      Emphysema Paternal Grandfather        Allergies: No Known Allergies    Active Medications:   No current outpatient medications on file.       Systemic Review:   CONSTITUTIONAL: NEGATIVE for fever, chills, change in weight  ENT/MOUTH: NEGATIVE + intubated  RESP: +intubated  CV: NEGATIVE for chest pain, palpitations or peripheral edema    Physical Examination:   Vital Signs: BP 97/60   Pulse 66   Temp 98.4  F (36.9  C)   Resp 21   Ht 1.626 m (5' 4\")   Wt 74.5 kg (164 lb 3.9 oz)   SpO2 99%   BMI 28.19 kg/m    Intubated/sedated    Plan: Appropriate to proceed as scheduled.      Miquel Pisano MD  10/23/2021    PCP:  Lia Spicer    "

## 2021-10-23 NOTE — PLAN OF CARE
Major Shift Events:  Opens eyes spontaneously and made eye contact with RN when RN called patient's name, but not following commands/moving exrtremities.  Able to move head.  Pupils remain reactive, brisk, and equal. NPO since midnight for planned EGD this AM.  Remains on esmolol and nicardipine.  MAPs 65-85 with SBP 120s-130s.    Plan: Continue to monitor neuro status.  EGD this AM.  For vital signs and complete assessments, please see documentation flowsheets.

## 2021-10-23 NOTE — PROGRESS NOTES
EEG CLINICAL NEUROPHYSIOLOGY PRELIMINARY REPORT    Video EEG study through 4 AM today reviewed.  Findings are abnormal.     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  Epileptiform discharges were seizures not seen.  No evidence of nonconvulsive status epilepticus.    Full report in chart.    Giovany Greenfield MD  Contact information for physicians covering Epilepsy and EEG is available on [facility].  Click search, enter neurology adult/[facility] in group name box, click on neurology adult[facility], then click Staff Epilepsy and EEG.

## 2021-10-23 NOTE — PROGRESS NOTES
Called with consult in the late afternoon. S/p BSLT 10/16/21. 10/20 blood culture 1 of 4 bottles with yeast. 1,3 BD glucan 399.  Bronch cultures growing C albicans. 10/22 blood cultures ngtd. 10/22 CT C/A/P unrevealing for a source. Being screened for Cocci based on epidemiologic risk factors and prior CT findings. 10/17 Cocci antibody was negative.   -Agree with micafungin 100 mg IV q 24 hours.  -Obtain TTE to assess for vegetations  -Consult opthalmology to assess for chorioretinitis  -Repeat blood cultures are pending  -critically ill with numerous lines in place. Will need to consider a line holiday once stable.   -Send serum cryptococcal antigen (ordered placed)  -Full consult to follow tomorrow 10/23

## 2021-10-23 NOTE — PLAN OF CARE
Major Shift Events: PT VSS, and afebrile. Pt is on CMV-AC mode on vent. Rate 18, peep 10, fio2 50%. Pt is on esmolol and cardene to maintain SBP<140 and MAP >65. Pt is on insulin gtt to maintain -150. Pt having adequate UO and BM's. Pt having adequate output from the CT's and no output out of the wound vac. Pt had EGD, bronch, TC, and MRI of the head today. Pt's OG and NJ were removed during EGD, new OG will placed and trickle TF started at 10mL/hr.     Plan: Continue to monitor.     For vital signs and complete assessments, please see documentation flowsheets.

## 2021-10-23 NOTE — PROCEDURES
[facility] Brief Operative Note    Procedure:  Upper endoscopy   Post-operative diagnosis:  NJ/OG tube trauma   Staff Physician:  Dr. Miquel Pisano   Fellow/Assistant(s):  NA    Specimens:  Please see final procedure note for further details.   Findings:  NJ/OG tube trauma with scant oozing   Complications:  None.   Condition:  Stable   Recommendations  Diet:  OG tube was removed. NJ tube was displaced into the stomach. Will need to have this replaced by IR prior to restaring feeds  PPI:  PPI IV BID  Anti-coagulants/platelets:  If needed anticoagultion can be restarted. Will likely have ongoing oozing from NJ/OG tube trauma  Octreotide:  N/A  Discharge Planning:   GI will sign off. Please call with questions

## 2021-10-23 NOTE — PROGRESS NOTES
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    OVERNIGHT EVENTS:  Yesterday afternoon blood cx resulted with yeast     TODAY'S PROGRESS:   - surveillance cx  - TC today  - MRI brain  - tracheostomy consult  - remove CVC and swan   - low intensity heparin gtt  - stop ceftazidime  - diuresis with bumex     PLAN:  Neuro/ pain/ sedation:  Acute postoperative pain  Anxiety and depression  Acute to subacute CVA, b/l cerebral hemispheres and L cerebellum  Encephalopathy and diffuse weakness  - Pain: Fentanyl gtt at 50   - Sedation: none, given encephalopathy   - PTA escitalopram on hold   - MRI brain   - low-intensity heparin gtt today   - appreciate neurology     Pulmonary care:   Acute Hypoxic Respiratory Failure  S/p B/l lung transplant  SALTY on home CPAP  - Ventilator Settings:CMV- 18/400/10/50  - Levalbuterol nebs and Mucomyst  - Chest Physiotherapy  - Daily CXR  - Titrate supplemental oxygen to maintain saturation above 92%.  - Pulmonary hygiene: Incentive spirometer every 15- 30 minutes when awake, flutter valve, C&DB  - 2mg bumex BID bumex, goal net -500-1L   - bronch today   - thoracic consult for trach       Cardiovascular:    S/p b/l lung transplant on 10/16/21 by Dr. Corral  HTN  Afib   Cardiogenic shock - now hypertensive   - Monitor hemodynamic status.   - Esmolol, Nicardipine gtt. MAP <100, SBP <140  - Amlodipine 10 mg daily. Hydralazine  50 mg oral q8, isordil 20 tid  - Statin: rosuvastatin 10mg given drug interactions  - ASA: held pending GI eval    - Amiodarone 400 mg BID  - TC today for fungemia to assess valve     GI care/ Nutrition:   Elevated LFTs- resolving  GI  bleed - NG trauma per GI   - Diet: holding TF pending GI   - PPI BID  - Continue bowel regimen: miralax, senna  - Trend LFTs  - GI consulted, EGD today c/w NG trauma, no ulcers, ok to anticoagulate     Renal/ Fluid Balance/ Electrolytes:   Acute Kidney Injury- resolving  BL creat appears to be ~ 0.7  - bumex 2 BID for goal net negative -500-1L    - Strict I/O, daily weights  - Avoid/limit nephrotoxins as able  - Replete lytes PRN per protocol     Endocrine:    Stress induced hyperglycemia  Hypothyroidism  DM  RA  Preop A1c 6.6  - Insulin gtt  - Goal BG <180 for optimal healing  - PTA meds: Solumedrol 125 mg q6, Synthroid 25 mcg  - Received 2 doses of Rituximab 6/21  - Rheumatology consult     ID/ Antibiotics:  Immunosuppression  Stress induced leukocytosis  Fungemia, source unclear  - Discontinue ceftazidime today   - Micafungin(10/22-)  - Nystatin, Acyclovir  - Tacrolimus  - Methylpred followed by oral prednisone  - Continue to monitor fever curve, WBC and inflammatory markers as appropriate  - Follow up cultures  - appreciate transplant ID  - remove swan and central line given fungemia, keep picc for now   - TC today   - Ophthalmology consulted     Heme:     Acute blood loss anemia  Hypogammaglobulinemia s/p IVIG  Theombocytopenia, HIT negative   No s/sx active bleeding  - CBC in AM  - ID as above     MSK/ Skin:  Sternotomy  Surgical Incision  - Sternal precautions  - Postoperative incision management per protocol  - PT/OT/CR     Prophylaxis:    - Mechanical prophylaxis for DVT: SCDs  - Chemical DVT prophylaxis: Heparin subcutaneous  - PPI for 30 days postoperatively     Lines/ tubes/ drains:  - Right PICC(pre-op)  - Right radial A line(10/16)  - Right CVC(10/16) - pull   - Right [place](10/16) - pull  - ETT(10/16)  - Watson(10/16)  - Chest tubes- 4 pleural, 1 mediastinal(10/16)  - OG(10/16)   - NJ(10/18)     Disposition:  - [facility] ICU.      Patient seen, findings and plan discussed with CV ICU staff.     Altagracia Allan,  "MD  Surgery Resident PGY-3  Pg [id number]    ====================================    SUBJECTIVE:   Still not following commands     OBJECTIVE:   1. VITAL SIGNS:   Temp:  [98.4  F (36.9  C)-100.9  F (38.3  C)] 98.4  F (36.9  C)  Pulse:  [] 66  Resp:  [21-48] 21  MAP:  [68 mmHg-238 mmHg] 72 mmHg  Arterial Line BP: (105-185)/(31-73) 118/55  FiO2 (%):  [50 %] 50 %  SpO2:  [95 %-99 %] 99 %  Ventilation Mode: CMV/AC  (Continuous Mandatory Ventilation/ Assist Control)  FiO2 (%): 50 %  Rate Set (breaths/minute): 18 breaths/min  Tidal Volume Set (mL): 400 mL  PEEP (cm H2O): 10 cmH2O  Oxygen Concentration (%): 50 %  Resp: 21      2. INTAKE/ OUTPUT:   I/O last 3 completed shifts:  In: 4604.98 [I.V.:2644.98; NG/GT:480]  Out: 3730 [Urine:2960; Emesis/NG output:350; Chest Tube:420]    3. PHYSICAL EXAMINATION:   General: sedated and intubated  Neuro: BRAYDEN  Resp: nonlabored on vent, intermittent \"jumping\" with breath   CV: RRR on tele   Abdomen: nondistended   Incisions: c/d/i  Extremities: warm and well perfused, no edema  CTs serosanguinous     4. INVESTIGATIONS:   Arterial Blood Gases   Recent Labs   Lab 10/23/21  0345 10/22/21  2318 10/22/21  2012 10/22/21  1602   PH 7.49* 7.44 7.51* 7.53*   PCO2 34* 39 34* 40   PO2 127* 147* 94 90   HCO3 26 27 27 34*     Complete Blood Count   Recent Labs   Lab 10/23/21  0344 10/22/21  2013 10/22/21  1407 10/22/21  0958 10/22/21  0407 10/22/21  0407 10/21/21  0354 10/21/21  0354   WBC 21.3*  --   --  20.7*  --  18.7*  --  26.5*   HGB 9.6* 9.0* 10.4* 9.1*   < > 6.6*   < > 8.3*   *  --   --  112*  --  93*  --  114*    < > = values in this interval not displayed.     Basic Metabolic Panel  Recent Labs   Lab 10/23/21  0640 10/23/21  0529 10/23/21  0422 10/23/21  0344 10/22/21  2214 10/22/21  2120 10/22/21  1710 10/22/21  1601 10/22/21  1039 10/22/21  0958   NA  --   --   --  146*  --  146*  --  147*  --  147*   POTASSIUM  --   --   --  3.7  --  3.9  --  3.6  --  3.5   CHLORIDE  --   --   --  " 109  --  110*  --  109  --  106   CO2  --   --   --  32  --  30  --  31  --  34*   BUN  --   --   --  67*  --  62*  --  68*  --  62*   CR  --   --   --  1.43*  --  1.37*  --  1.47*  --  1.54*   * 141* 137* 132*   < > 201*   < > 148*   < > 92    < > = values in this interval not displayed.     Liver Function Tests  Recent Labs   Lab 10/23/21  0344 10/22/21  2120 10/22/21  1601 10/22/21  1407 10/22/21  0958 10/22/21  0407 10/22/21  0407 10/21/21  1705 10/21/21  1332   AST 27 28 32  --  27   < > 31   < >  --    * 236* 281*  --  281*   < > 314*   < >  --    ALKPHOS 182* 183* 178*  --  156*   < > 163*   < >  --    BILITOTAL 0.3 0.3 0.3  --  0.3   < > 0.2   < >  --    ALBUMIN 1.3* 1.3* 1.2*  --  1.1*   < > 1.2*   < >  --    INR 1.41*  --   --  1.28*  --   --  1.33*  --  1.43*    < > = values in this interval not displayed.     Pancreatic Enzymes  No lab results found in last 7 days.  Coagulation Profile  Recent Labs   Lab 10/23/21  0344 10/22/21  1407 10/22/21  0407 10/21/21  1332 10/17/21  1535 10/17/21  0346 10/16/21  2032 10/16/21  2032 10/16/21  1535 10/16/21  1535   INR 1.41* 1.28* 1.33* 1.43*   < > 1.77*   < > 1.54*   < > 1.38*   PTT  --  43*  --   --   --  41*  --  42*  --  51*    < > = values in this interval not displayed.         5. RADIOLOGY:   Recent Results (from the past 24 hour(s))   CT Head w/o Contrast    Narrative    CT HEAD W/O CONTRAST 10/22/2021 8:32 AM    History:  Neuro deficit, acute, stroke suspected     Comparison: None.     Technique: Using multidetector thin collimation helical acquisition  technique, axial, coronal and sagittal CT images from the skull base  to the vertex were obtained without intravenous contrast.     Findings: Focal hypodensity in the left posterior parieto-occipital  region. Few additional regions of focal hyperdensity including  involvement of the posterior right parietal lobe, left frontal centrum  semiovale and, right frontal lobe, and the posterolateral  left  cerebellum.    There is no intracranial hemorrhage, mass effect or midline shift.  There is no focal loss of gray-white matter differentiation, insular  ribbon sign, or focally hyperdense artery to suggest acute infarction.  The ventricles are proportionate to the cerebral sulci.    The bony calvaria and the bones of the skull base appear normal. The  visualized portions of the paranasal sinuses are clear. Minimal  bilateral dependent mastoid effusions.      Impression    Impression:   1. No acute intracranial hemorrhage. No acute loss of gray-white  regurgitation.  2. Prior infarcts in the bilateral cerebral hemispheres and left  cerebellar hemisphere, likely late subacute.    [Stroke Code Result: No acute intracranial hemorrhage.]    Finding was identified on 10/22/2021 8:58 AM.     Stroke team was contacted by Dr. Hinson on 10/22/2021 8:58 AM and  verbalized understanding of the result.    [facility] Stroke Communication Guidelines:  [facility] ED: [phone] (EB ED)  [facility] In[facility]: [phone] ext. [phone] (Stroke Ascom)  [facility] [phone] ([facility] ED)    I have personally reviewed the examination and initial interpretation  and I agree with the findings.    ROSS SANTIAGO MD         SYSTEM ID:  [id number]   CT Head Perfusion w Contrast    Narrative    CTA  HEAD NECK WITH CONTRAST, CT HEAD PERFUSION WITH CONTRAST  10/22/2021 8:35 AM    CT angiogram of the neck   CT angiogram of the base of the brain with contrast  Reconstruction on the 3D workstation    Provided History:  Stroke/TIA, assess intracranial arteries;  Stroke/TIA, assess extracranial arteries; Unresponsiveness, CODE  STROKE    Comparison:  CT 10/22/2021.      Technique:  HEAD and NECK CTA: During rapid bolus intravenous injection of  nonionic contrast material, axial images were obtained using thin  collimation multidetector helical technique from the base of the upper  aortic arch through the [place]. This CT angiogram  data was  reconstructed at thin intervals with mild overlap. Images were sent to  the 3D workstation, and 3D reconstructions were obtained. The axial  source images, multiplanar reformations, 3D reconstructions in both  maximum intensity projection display and volume rendered models were  reviewed, with reconstructions performed by the technologist.    CT Perfusion  CT BRAIN PERFUSION: Dynamic perfusion CT of the brain was performed at  multiple levels; perfusion was measured and imaged during rapid bolus  intravenous injection of nonionic iodinated contrast medium. Images  were post-processed, reconstructed, and reviewed.     Contrast: iopamidol (ISOVUE-370) solution 115 mL    Findings:  CTA:  Head CTA demonstrates no aneurysm or stenosis of the major  intracranial arteries.    Neck CTA demonstrates no stenosis of the major cervical arteries. The  origins of the great vessels from the aortic arch are patent. The  normal distal right internal carotid artery measures 5 mm. The normal  distal left internal carotid artery measures 5 mm.     Postsurgical changes of the chest with subcutaneous emphysema  extending along the anterior chest wall and into the neck. Partially  visualized bilateral chest tubes. ET tube tip in the upper thoracic  trachea. Partially visualized nasoenteric tube..     CT Perfusion:  CT Perfusion: Focally decreased cerebral blood volume and cerebral  blood flow in the posterior most left parieto-occipital region with  corresponding slight increase in TTP possibly representing an area of  hypoperfusion or core infarct.    Rapid data processing fail due to no suitable area for location being  identified.        Impression    Impression:    1. Head CTA demonstrates no aneurysm or stenosis of the major  intracranial arteries.   2. Neck CTA demonstrates no stenosis of the major cervical arteries.   3. CT perfusion demonstrates focally asymmetric decrease in CBF and  CBV with a corresponding increase in  MTT at the posterior most left  parieto-occipital region suggestive of core infarct, corresponding to  same-day CT findings. Rapid data processing failed and so core infarct  volume, hypoperfusion volume, and mismatch volume are not calculated.    Findings were discussed by Dr. Terrell of radiology with the stroke  team at approximately 9:10 AM on 10/22/2021.    I have personally reviewed the examination and initial interpretation  and I agree with the findings.    SUZI HOLDEN MD         SYSTEM ID:  [id number]   CTA Head Neck with Contrast    Narrative    CTA  HEAD NECK WITH CONTRAST, CT HEAD PERFUSION WITH CONTRAST  10/22/2021 8:35 AM    CT angiogram of the neck   CT angiogram of the base of the brain with contrast  Reconstruction on the 3D workstation    Provided History:  Stroke/TIA, assess intracranial arteries;  Stroke/TIA, assess extracranial arteries; Unresponsiveness, CODE  STROKE    Comparison:  CT 10/22/2021.      Technique:  HEAD and NECK CTA: During rapid bolus intravenous injection of  nonionic contrast material, axial images were obtained using thin  collimation multidetector helical technique from the base of the upper  aortic arch through the [place]. This CT angiogram data was  reconstructed at thin intervals with mild overlap. Images were sent to  the 3D workstation, and 3D reconstructions were obtained. The axial  source images, multiplanar reformations, 3D reconstructions in both  maximum intensity projection display and volume rendered models were  reviewed, with reconstructions performed by the technologist.    CT Perfusion  CT BRAIN PERFUSION: Dynamic perfusion CT of the brain was performed at  multiple levels; perfusion was measured and imaged during rapid bolus  intravenous injection of nonionic iodinated contrast medium. Images  were post-processed, reconstructed, and reviewed.     Contrast: iopamidol (ISOVUE-370) solution 115 mL    Findings:  CTA:  Head CTA demonstrates no  aneurysm or stenosis of the major  intracranial arteries.    Neck CTA demonstrates no stenosis of the major cervical arteries. The  origins of the great vessels from the aortic arch are patent. The  normal distal right internal carotid artery measures 5 mm. The normal  distal left internal carotid artery measures 5 mm.     Postsurgical changes of the chest with subcutaneous emphysema  extending along the anterior chest wall and into the neck. Partially  visualized bilateral chest tubes. ET tube tip in the upper thoracic  trachea. Partially visualized nasoenteric tube..     CT Perfusion:  CT Perfusion: Focally decreased cerebral blood volume and cerebral  blood flow in the posterior most left parieto-occipital region with  corresponding slight increase in TTP possibly representing an area of  hypoperfusion or core infarct.    Rapid data processing fail due to no suitable area for location being  identified.        Impression    Impression:    1. Head CTA demonstrates no aneurysm or stenosis of the major  intracranial arteries.   2. Neck CTA demonstrates no stenosis of the major cervical arteries.   3. CT perfusion demonstrates focally asymmetric decrease in CBF and  CBV with a corresponding increase in MTT at the posterior most left  parieto-occipital region suggestive of core infarct, corresponding to  same-day CT findings. Rapid data processing failed and so core infarct  volume, hypoperfusion volume, and mismatch volume are not calculated.    Findings were discussed by Dr. Terrell of radiology with the stroke  team at approximately 9:10 AM on 10/22/2021.    I have personally reviewed the examination and initial interpretation  and I agree with the findings.    SUZI HOLDEN MD         SYSTEM ID:  [id number]   CT Chest w/o Contrast    Narrative    EXAMINATION: CT CHEST W/O CONTRAST, 10/22/2021 8:37 AM    CLINICAL HISTORY: Interstitial lung disease. Status post lung  transplantation on 10/16/2021; poor lung  function    COMPARISON: Radiographs same day, 10/21/2021. CT 9/30/2021..    TECHNIQUE: CT imaging obtained through the chest without contrast.  Coronal, sagittal and axial MIP reformatted images obtained and  reviewed.     CONTRAST: None    FINDINGS:    Lines, tubes, devices: Endotracheal tube tip terminating in the mid  thoracic trachea. Feeding tube and gastric tube coursing into the  stomach with tips of the field of view. Right internal jugular  Jean-Sintia catheter with tip terminating in the proximal right  pulmonary artery. Mediastinal drain in stable position. Right upper  extremity PICC with tip terminating at the superior cavoatrial  junction. Bilateral apically oriented chest tubes in stable position.  Clamshell sternotomy wires are intact. Cutaneous staple line.    Lungs: Postoperative changes of bilateral lung transplantation. Small  right and trace left pleural effusions. Scattered groundglass  opacities. Mild smooth interlobular septal thickening. Consolidative  density of the left upper lobe along the major fissure with air  bronchograms. Multifocal consolidative opacities of the left lower  lobe. Mucous plugging of the right lower lobe with a small amount of  collapse of the right lower lobe. The central tracheobronchial tree is  patent. No areas of bronchiectasis or architectural distortion. No  pneumothorax. Mild bibasilar atelectasis.     Mediastinum: The visualized thyroid is unremarkable.Heart size is  within normal limits. No pericardial effusion. Small focus of  pneumomediastinum located in the anterior mediastinum. The thoracic  aorta and main pulmonary artery are within normal limits. Standard  branching pattern of the great vessels. No abnormal thoracic lymph  nodes.    Bones and soft tissues: No suspicious osseous lesion. Mild  degenerative changes of the thoracolumbar spine. Sclerotic focus on  the posterior aspect of the right second rib, favoring bone island.  Extensive subcutaneous  emphysema over the anterior chest wall and  right neck.    Upper abdomen:  Limited evaluation of the upper abdomen. No acute  pathology of the visualized solid organs. Small sliding-type hiatal  hernia.      Impression    IMPRESSION:   1. Postoperative changes of bilateral lung transplantation with  supportive devices in stable positioning.  2. Multifocal consolidative opacities involving the left upper, left  lower, and right lower lobes with scattered mucous plugging.  Concerning for aspiration or infection.  3. Multifocal diffuse groundglass opacities with mild interlobular  septal thickening, compatible with pulmonary edema.  4. Extensive subcutaneous emphysema of the anterior chest wall and  right neck.  .    I have personally reviewed the examination and initial interpretation  and I agree with the findings.    MAURICIO NAVAS MD         SYSTEM ID:  [id number]   CT Cervical Spine w Contrast    Narrative    CT CERVICAL SPINE W CONTRAST 10/22/2021 10:43 AM    Provided History: Bilateral LE clonus, not moving extremities    Comparison: None available    Technique: Reconstructions are performed from a CTA of the head and  neck.     Findings:    The cervical vertebrae are normally aligned. Loss of normal cervical  lordosis. No acute fracture or subluxation. No prevertebral edema.  Multilevel degenerative osteophytosis. Multilevel mild disc height  narrowing with moderate disc height narrowing at C5-6 and C6-7.     The findings on a level by level basis are as follows:    C2-3:  No spinal canal or neural foraminal stenosis.    C3-4:  Bilateral uncinate spurring. No spinal canal narrowing or  neural foraminal stenosis.    C4-5:  Right facet arthropathy. Bilateral uncinate spurring. Mild  right neural foraminal narrowing. Mild spinal canal stenosis.    C5-6:  Bilateral uncinate spurring. Bilateral moderate neural  foraminal narrowing. Posterior osteophytes causing mild to moderate  spinal canal narrowing.    C6-7:  No  spinal canal or neural foraminal stenosis.    C7-T1:  No spinal canal or neural foraminal stenosis.     No abnormality of the paraspinous soft tissues.      Impression    Impression:     1. No acute fracture or traumatic subluxation.  2. Degenerative changes as described above.    I have personally reviewed the examination and initial interpretation  and I agree with the findings.    SUZI HOLDEN MD         SYSTEM ID:  [id number]   CT Abdomen Pelvis w Contrast    Narrative    EXAMINATION: CT ABDOMEN PELVIS W CONTRAST, CT CHEST PULMONARY EMBOLISM  W CONTRAST, 10/22/2021 1:07 PM    TECHNIQUE:  Helical CT images from the lung apices through the  symphysis pubis were obtained with IV contrast. CTPA of the chest,  contrast bolus also used for concurrently acquired CT abdomen pelvis.     COMPARISON: Same day radiograph. CT chest same day, and 9/30/2021.    HISTORY: Evaluate for fluid in pelvis or pathology in abdomen.  Bilateral lung transplant performed 10/16/2021.    FINDINGS:  Support devices:  Endotracheal tube terminates in the low thoracic trachea. Gastric tube  tip is in the stomach. Feeding tube tip is in the proximal jejunum.  Right basilar chest tube. Right apical chest tube. Left basilar chest  tube. A left apical chest tube. Mediastinal drains. Right arm PICC  terminates in the SVC. Right IJ approach [place]-Sintia catheter tip is in  the main pulmonary artery.    Chest:  No pulmonary embolus.    Surgical changes of bilateral lung transplantation. No adenopathy.  Esophagus is unremarkable. Trachea and central airways are clear of  debris. Trace fluid in the air in the pleural spaces bilaterally, with  chest tubes in place as described. Patchy opacities in the lung bases  with more confluence consolidative appearance containing air  bronchograms in the right lower lobe, and left upper lobe along the  interlobar fissure similar to prior exam.    Abdomen and pelvis: Subcentimeter hypodensity in the central  right  hepatic lobe similar to prior exams. Gallbladder, bile ducts,  pancreas, spleen adrenal glands, kidneys all are unremarkable. Urinary  bladder is decompressed around a Watson catheter bulb, with small  amount of contrast remaining within the bladder. Enteric contrast  throughout the colon. Colonic diverticulosis without acute  inflammation. Appendix is normal. No free fluid or free air. No  abdominal pelvic lymphadenopathy. Major vessels are normal.    Bones and soft tissues:  No acute or suspicious osseous lesion. Clamshell sternotomy wires.  Expected air in the soft tissues overlying the upper chest and neck  similar to prior.      Impression    IMPRESSION:   1. No acute pulmonary embolus.  2. Surgical changes of bilateral lung transplantation are stable  compared to exam performed earlier today. Similar appearance of  opacities throughout both lungs, likely representing atelectasis  and/or consolidation.   3. No acute abnormality in the abdomen or pelvis.      I have personally reviewed the examination and initial interpretation  and I agree with the findings.    FITO PERALES MD         SYSTEM ID:  [id number]   CT Chest Pulmonary Embolism w Contrast    Narrative    EXAMINATION: CT ABDOMEN PELVIS W CONTRAST, CT CHEST PULMONARY EMBOLISM  W CONTRAST, 10/22/2021 1:07 PM    TECHNIQUE:  Helical CT images from the lung apices through the  symphysis pubis were obtained with IV contrast. CTPA of the chest,  contrast bolus also used for concurrently acquired CT abdomen pelvis.     COMPARISON: Same day radiograph. CT chest same day, and 9/30/2021.    HISTORY: Evaluate for fluid in pelvis or pathology in abdomen.  Bilateral lung transplant performed 10/16/2021.    FINDINGS:  Support devices:  Endotracheal tube terminates in the low thoracic trachea. Gastric tube  tip is in the stomach. Feeding tube tip is in the proximal jejunum.  Right basilar chest tube. Right apical chest tube. Left basilar chest  tube. A left  apical chest tube. Mediastinal drains. Right arm PICC  terminates in the SVC. Right IJ approach Gibson-Sintia catheter tip is in  the main pulmonary artery.    Chest:  No pulmonary embolus.    Surgical changes of bilateral lung transplantation. No adenopathy.  Esophagus is unremarkable. Trachea and central airways are clear of  debris. Trace fluid in the air in the pleural spaces bilaterally, with  chest tubes in place as described. Patchy opacities in the lung bases  with more confluence consolidative appearance containing air  bronchograms in the right lower lobe, and left upper lobe along the  interlobar fissure similar to prior exam.    Abdomen and pelvis: Subcentimeter hypodensity in the central right  hepatic lobe similar to prior exams. Gallbladder, bile ducts,  pancreas, spleen adrenal glands, kidneys all are unremarkable. Urinary  bladder is decompressed around a Watson catheter bulb, with small  amount of contrast remaining within the bladder. Enteric contrast  throughout the colon. Colonic diverticulosis without acute  inflammation. Appendix is normal. No free fluid or free air. No  abdominal pelvic lymphadenopathy. Major vessels are normal.    Bones and soft tissues:  No acute or suspicious osseous lesion. Clamshell sternotomy wires.  Expected air in the soft tissues overlying the upper chest and neck  similar to prior.      Impression    IMPRESSION:   1. No acute pulmonary embolus.  2. Surgical changes of bilateral lung transplantation are stable  compared to exam performed earlier today. Similar appearance of  opacities throughout both lungs, likely representing atelectasis  and/or consolidation.   3. No acute abnormality in the abdomen or pelvis.      I have personally reviewed the examination and initial interpretation  and I agree with the findings.    FITO PERALES MD         SYSTEM ID:  [id number]       =========================================

## 2021-10-23 NOTE — PROGRESS NOTES
[facility]    Stroke Progress Note    Interval EventsInitial EEG readings show encephalopathy.  TC today  Endoscopy today, ok to start anticoagulation from GI perspective.     HPI Summary  Edson Thornton is a 56 year old male with with Afib, HTN, RA, SALTY, and ILD s/p lung transplant 10/16/21.  Per primary team he has had significant difficulty with ventilation required paralytics and sedation, however, he has typically moved all his extremities with full strength.  It was noted on 10/21 that he was no longer moving his lower extremities.  Stroke code activated on morning of 10/22 for persistent neurologic change.      Stroke Evaluation Summarized    MRI/Head CT MRI Pending    Head CT: multiple scattered subacute ischemic strokes, predating exam change from 10/22.  May contribute to encephalopathy, but would not explain lack of extremity movement.    Intracranial Vasculature Head CTA demonstrates no aneurysm or stenosis of major intracranial arteries.    CT perfusion - focally decreased cerebral blood volume and cerebral blood flow in the posterior most left parieto-occipital region with corresponding slight increase in TTP possibly representing an area of hypoperfusion or core infarct.    Cervical Vasculature No stenosis of major cervical arteries     Echocardiogram TC pending.   EKG/Telemetry Sinus tachycardia   Otherwise normal ECG   When compared with ECG of 21-OCT-2021 06:50,   ST no longer depressed in Anterior leads   ST no longer elevated in Lateral leads    Other Testing Preliminary EEG results show diffuse encephalopathy.     LDL  No lab value available in past 30 days   A1C  No lab value available in past 30 days   Troponin 10/22/2021: 0.807 ug/L*       Impression     #Acute to subacute ischemic stroke of bilateral cerebral hemispheres and left cerebellum with suspected cardioembolic etiology  #Encephalopathy and diffuse weakness  #Sustained ankle  clonus, resolved  Pt was initially moving all extremities and agitation requiring restraints.  On the morning of 10/22, a stroke code was called due to unresponsiveness and the patient not moving his extremities.  Head CT showed multiple ischemic infarcts in bilateral cerebral hemispheres and left cerebellum with consistent CT perfusion studies.   - Sustained clonus seen on exam on 10/22 concerning for cervical myelopathy.  Self resolved and not seen on exam on 10/23.  CT cervical spine showed no acute fracture or traumatic subluxation.   - Agree with TC: neurology suggests evaluation of pulmonary veins to assess for thrombosis.  - EEG preliminarily shows diffuse encephalopathy.  - Anticoagulation per primary team.  EGD today showed scant oozing from NJ/OG tube trauma, Per GI, okay to start anticoagulation.   - Suggest MRI Brain: examination deficits do not match with known infarcts seen on head CT. Different etiology is suspected to account for examination deficits.    Differential includes: septic embolism, infectious encephalopathy, ICU delirium, anoxic brain injury.  Presentation and clinical picture likely multifactorial.     ______________________________________________________    Clinically Significant Risk Factors Present on Admission                  Medications   Scheduled Meds    acetaZOLAMIDE  500 mg Oral or Feeding Tube TID     acetylcysteine  2 mL Nebulization 4x Daily     acyclovir  400 mg Oral or NG Tube BID     amiodarone  400 mg Oral BID     amLODIPine  10 mg Oral or Feeding Tube Daily     bumetanide  2 mg Intravenous BID     [START ON 10/24/2021] calcium carbonate 600 mg-vitamin D 400 units  1 tablet Oral or Feeding Tube BID w/meals     cefTAZidime  2 g Intravenous Q12H     [Held by provider] escitalopram  5 mg Oral or Feeding Tube Daily     [Held by provider] gabapentin  100 mg Oral or Feeding Tube TID     heparin ANTICOAGULANT  5,000 Units Subcutaneous Q8H     hydrALAZINE  25 mg Oral or  Feeding Tube Once     hydrALAZINE  50 mg Oral or Feeding Tube Q8H MARKY     isosorbide dinitrate  20 mg Oral or Feeding Tube TID     levalbuterol  1.25 mg Nebulization 4x Daily     levothyroxine  25 mcg Oral Daily     [Held by provider] melatonin  5 mg Oral or Feeding Tube QPM     micafungin  100 mg Intravenous Q24H     multivitamins w/minerals  15 mL Per Feeding Tube Daily     mycophenolate  1,500 mg Oral or NG Tube BID     nystatin  1,000,000 Units Swish & Swallow 4x Daily     pantoprazole  40 mg Per Feeding Tube BID AC    Or     pantoprazole (PROTONIX) IV  40 mg Intravenous BID AC     [START ON 10/24/2021] polyethylene glycol  17 g Oral or Feeding Tube Daily     potassium chloride  20 mEq Oral Daily     prednisoLONE  17.5 mg Oral or NG Tube BID     [START ON 10/24/2021] predniSONE  15 mg Per Feeding Tube BID     protein modular  1 packet Per Feeding Tube BID     rosuvastatin  10 mg Oral or Feeding Tube Daily     senna-docusate  1 tablet Oral or Feeding Tube BID     sodium chloride (PF)  3 mL Intracatheter Q8H     [Held by provider] sulfamethoxazole-trimethoprim  10 mL Oral or NG Tube Daily    Or     [Held by provider] sulfamethoxazole-trimethoprim  1 tablet Oral or NG Tube Daily     tacrolimus  4 mg Per OG Tube BID IS       Infusion Meds    dextrose 60 mL/hr at 10/23/21 0800     dextrose       esmolol 100 mcg/kg/min (10/23/21 0900)     fentaNYL 50 mcg/hr (10/23/21 0900)     insulin regular 4 Units/hr (10/23/21 0900)     niCARdipine (CARDENE) infusion ADULT/PEDS GREATER than or EQUAL to 45 kg max conc 7.5 mg/hr (10/23/21 1010)     BETA BLOCKER NOT PRESCRIBED         PRN Meds  bisacodyl, dextrose, dextrose, glucose **OR** dextrose **OR** glucagon, diphenhydrAMINE **OR** diphenhydrAMINE, fentaNYL, HYDROmorphone **OR** HYDROmorphone, lidocaine 4%, lidocaine (buffered or not buffered), magnesium hydroxide, methocarbamol, naloxone **OR** naloxone **OR** naloxone **OR** naloxone, ondansetron **OR** ondansetron, oxyCODONE  **OR** oxyCODONE, prochlorperazine **OR** prochlorperazine, BETA BLOCKER NOT PRESCRIBED, sodium chloride (PF)       PHYSICAL EXAMINATION  Temp:  [98.1  F (36.7  C)-100.9  F (38.3  C)] 98.1  F (36.7  C)  Pulse:  [] 66  Resp:  [21-30] 27  MAP:  [62 mmHg-104 mmHg] 66 mmHg  Arterial Line BP: (112-169)/(42-73) 113/49  FiO2 (%):  [50 %] 50 %  SpO2:  [95 %-99 %] 99 %      Mental Status:  Intubated, does not follow commands, opens eyes to loud voice and noxious but does not track or attend to examiner.  Cranial Nerves:  PERRL, does not blink to threat bilaterally, corneal reflex intact bilaterally, face appears grossly symmetric  Motor:  does not move any extremity to deep noxious stimuli, moves head slightly to noxious stimuli.   Reflexes:  Reflexes brisk globally.   Sensory:  Does not withdraw from noxious stimuli in all four extremities.   Coordination:  unable to assess  Station/Gait:  deferred    Stroke Scales    NIHSS  Interval     Interval Comments     1a. Level of Consciousness 3-->Responds only with reflex motor or autonomic effects or totally unresponsive, flaccid, and areflexic   1b. LOC Questions 2-->Answers neither question correctly   1c. LOC Commands 2-->Performs neither task correctly   2.   Best Gaze 0-->Normal   3.   Visual 2-->Complete hemianopia   4.   Facial Palsy 0-->Normal symmetrical movements   5a. Motor Arm, Left 4-->No movement   5b. Motor Arm, Right 4-->No movement   6a. Motor Leg, Left 4-->No movement   6b. Motor Leg, right 4-->No movement   7.   Limb Ataxia 0-->Absent   8.   Sensory 2-->Severe to total sensory loss, patient is not aware of being touched in the face, arm, and leg   9.   Best Language 0-->No aphasia, normal (intubated)   10. Dysarthria (UN) Intubated or other physical barrier   11. Extinction and Inattention  0-->No abnormality   Total 27 (10/22/21 1033)       Imaging  I personally reviewed all imaging; relevant findings per HPI.     Lab Results Data   CBC  Recent Labs    Lab 10/23/21  0344 10/22/21  2013 10/22/21  1407 10/22/21  0958 10/22/21  0958 10/22/21  0407 10/22/21  0407   WBC 21.3*  --   --   --  20.7*  --  18.7*   RBC 3.21*  --   --   --  2.96*  --  2.23*   HGB 9.6* 9.0* 10.4*   < > 9.1*   < > 6.6*   HCT 30.2*  --   --   --  28.0*  --  21.7*   *  --   --   --  112*  --  93*    < > = values in this interval not displayed.     Basic Metabolic Panel    Recent Labs   Lab 10/23/21  0840 10/23/21  0751 10/23/21  0640 10/23/21  0422 10/23/21  0344 10/22/21  2214 10/22/21  2120   NA  --  145*  --   --  146*  --  146*   POTASSIUM  --  3.7  --   --  3.7  --  3.9   CHLORIDE  --  110*  --   --  109  --  110*   CO2  --  30  --   --  32  --  30   BUN  --  73*  --   --  67*  --  62*   CR  --  1.31*  --   --  1.43*  --  1.37*   * 126* 138*   < > 132*   < > 201*   ERIK  --  8.7  --   --  8.5  --  7.9*    < > = values in this interval not displayed.     Liver Panel  Recent Labs   Lab 10/23/21  0751 10/23/21  0344 10/22/21  2120   PROTTOTAL 5.7* 5.8* 5.6*   ALBUMIN 1.3* 1.3* 1.3*   BILITOTAL 0.3 0.3 0.3   ALKPHOS 176* 182* 183*   AST 25 27 28   * 223* 236*     INR    Recent Labs   Lab Test 10/23/21  0344 10/22/21  1407 10/22/21  0407   INR 1.41* 1.28* 1.33*      Lipid Profile    Recent Labs   Lab Test 10/22/21  0407 10/21/21  0354 10/20/21  0308 10/19/21  0338 09/07/21  1324   CHOL  --   --   --   --  214*   HDL  --   --   --   --  51   LDL  --   --   --   --  130*   TRIG 307* 486* 383*   < > 364*    < > = values in this interval not displayed.     A1C    Recent Labs   Lab Test 09/07/21  0928 09/05/21  1901   A1C 6.6* 6.5*     Troponin I    Recent Labs   Lab 10/22/21  0958   TROPONIN 0.807*

## 2021-10-23 NOTE — OR NURSING
Pt underwent EGD. Fentanyl drip and bolus used for sedation. Sedation and monitoring managed by bedside ICU RN. No interventions or specimens collected. Pt left in care of ICU staff.       Kathy Lopez RN

## 2021-10-23 NOTE — PLAN OF CARE
Attempted visit for eye exam. Pt currently in procedure for TC.     Will try again tomorrow.     Margarita Falk MD  PGY-3 Resident Physician  Department of Ophthalmology

## 2021-10-24 ENCOUNTER — APPOINTMENT (OUTPATIENT)
Dept: GENERAL RADIOLOGY | Facility: CLINIC | Age: 56
End: 2021-10-24
Attending: STUDENT IN AN ORGANIZED HEALTH CARE EDUCATION/TRAINING PROGRAM
Payer: COMMERCIAL

## 2021-10-24 ENCOUNTER — APPOINTMENT (OUTPATIENT)
Dept: NEUROLOGY | Facility: CLINIC | Age: 56
End: 2021-10-24
Attending: NURSE PRACTITIONER
Payer: COMMERCIAL

## 2021-10-24 LAB
ABO/RH(D): NORMAL
ALBUMIN SERPL-MCNC: 1.4 G/DL (ref 3.4–5)
ALBUMIN SERPL-MCNC: 1.4 G/DL (ref 3.4–5)
ALBUMIN SERPL-MCNC: 1.5 G/DL (ref 3.4–5)
ALBUMIN SERPL-MCNC: 1.5 G/DL (ref 3.4–5)
ALP SERPL-CCNC: 156 U/L (ref 40–150)
ALP SERPL-CCNC: 160 U/L (ref 40–150)
ALP SERPL-CCNC: 170 U/L (ref 40–150)
ALP SERPL-CCNC: 172 U/L (ref 40–150)
ALT SERPL W P-5'-P-CCNC: 109 U/L (ref 0–70)
ALT SERPL W P-5'-P-CCNC: 139 U/L (ref 0–70)
ALT SERPL W P-5'-P-CCNC: 145 U/L (ref 0–70)
ALT SERPL W P-5'-P-CCNC: 153 U/L (ref 0–70)
ANION GAP SERPL CALCULATED.3IONS-SCNC: 4 MMOL/L (ref 3–14)
ANION GAP SERPL CALCULATED.3IONS-SCNC: 7 MMOL/L (ref 3–14)
ANION GAP SERPL CALCULATED.3IONS-SCNC: 8 MMOL/L (ref 3–14)
ANTIBODY SCREEN: NEGATIVE
AST SERPL W P-5'-P-CCNC: 18 U/L (ref 0–45)
AST SERPL W P-5'-P-CCNC: 20 U/L (ref 0–45)
AST SERPL W P-5'-P-CCNC: 20 U/L (ref 0–45)
AST SERPL W P-5'-P-CCNC: 23 U/L (ref 0–45)
BACTERIA SPT CULT: ABNORMAL
BACTERIA SPT CULT: ABNORMAL
BASE EXCESS BLDA CALC-SCNC: -0.6 MMOL/L (ref -9–1.8)
BASE EXCESS BLDA CALC-SCNC: -0.7 MMOL/L (ref -9–1.8)
BASE EXCESS BLDA CALC-SCNC: 0.4 MMOL/L (ref -9–1.8)
BASE EXCESS BLDA CALC-SCNC: 1.4 MMOL/L (ref -9–1.8)
BASE EXCESS BLDA CALC-SCNC: 1.5 MMOL/L (ref -9–1.8)
BASE EXCESS BLDA CALC-SCNC: 1.8 MMOL/L (ref -9–1.8)
BASE EXCESS BLDA CALC-SCNC: 2.1 MMOL/L (ref -9–1.8)
BASOPHILS # BLD MANUAL: 0 10E3/UL (ref 0–0.2)
BASOPHILS NFR BLD MANUAL: 0 %
BILIRUB DIRECT SERPL-MCNC: 0.1 MG/DL (ref 0–0.2)
BILIRUB SERPL-MCNC: 0.2 MG/DL (ref 0.2–1.3)
BILIRUB SERPL-MCNC: 0.2 MG/DL (ref 0.2–1.3)
BILIRUB SERPL-MCNC: 0.3 MG/DL (ref 0.2–1.3)
BILIRUB SERPL-MCNC: 0.4 MG/DL (ref 0.2–1.3)
BUN SERPL-MCNC: 57 MG/DL (ref 7–30)
BUN SERPL-MCNC: 60 MG/DL (ref 7–30)
BUN SERPL-MCNC: 61 MG/DL (ref 7–30)
BUN SERPL-MCNC: 63 MG/DL (ref 7–30)
BUN SERPL-MCNC: 68 MG/DL (ref 7–30)
CALCIUM SERPL-MCNC: 7.9 MG/DL (ref 8.5–10.1)
CALCIUM SERPL-MCNC: 8.3 MG/DL (ref 8.5–10.1)
CALCIUM SERPL-MCNC: 8.6 MG/DL (ref 8.5–10.1)
CALCIUM SERPL-MCNC: 8.7 MG/DL (ref 8.5–10.1)
CALCIUM SERPL-MCNC: 8.8 MG/DL (ref 8.5–10.1)
CHLORIDE BLD-SCNC: 109 MMOL/L (ref 94–109)
CHLORIDE BLD-SCNC: 111 MMOL/L (ref 94–109)
CHLORIDE BLD-SCNC: 111 MMOL/L (ref 94–109)
CHLORIDE BLD-SCNC: 112 MMOL/L (ref 94–109)
CHLORIDE BLD-SCNC: 113 MMOL/L (ref 94–109)
CO2 SERPL-SCNC: 26 MMOL/L (ref 20–32)
CO2 SERPL-SCNC: 26 MMOL/L (ref 20–32)
CO2 SERPL-SCNC: 27 MMOL/L (ref 20–32)
CO2 SERPL-SCNC: 30 MMOL/L (ref 20–32)
CO2 SERPL-SCNC: 30 MMOL/L (ref 20–32)
CREAT SERPL-MCNC: 1 MG/DL (ref 0.66–1.25)
CREAT SERPL-MCNC: 1.02 MG/DL (ref 0.66–1.25)
CREAT SERPL-MCNC: 1.05 MG/DL (ref 0.66–1.25)
CREAT SERPL-MCNC: 1.06 MG/DL (ref 0.66–1.25)
CREAT SERPL-MCNC: 1.11 MG/DL (ref 0.66–1.25)
EOSINOPHIL # BLD MANUAL: 0 10E3/UL (ref 0–0.7)
EOSINOPHIL NFR BLD MANUAL: 0 %
ERYTHROCYTE [DISTWIDTH] IN BLOOD BY AUTOMATED COUNT: 16.4 % (ref 10–15)
GFR SERPL CREATININE-BSD FRML MDRD: 74 ML/MIN/1.73M2
GFR SERPL CREATININE-BSD FRML MDRD: 78 ML/MIN/1.73M2
GFR SERPL CREATININE-BSD FRML MDRD: 79 ML/MIN/1.73M2
GFR SERPL CREATININE-BSD FRML MDRD: 82 ML/MIN/1.73M2
GFR SERPL CREATININE-BSD FRML MDRD: 84 ML/MIN/1.73M2
GLUCOSE BLD-MCNC: 123 MG/DL (ref 70–99)
GLUCOSE BLD-MCNC: 159 MG/DL (ref 70–99)
GLUCOSE BLD-MCNC: 185 MG/DL (ref 70–99)
GLUCOSE BLD-MCNC: 193 MG/DL (ref 70–99)
GLUCOSE BLD-MCNC: 202 MG/DL (ref 70–99)
GLUCOSE BLDC GLUCOMTR-MCNC: 106 MG/DL (ref 70–99)
GLUCOSE BLDC GLUCOMTR-MCNC: 113 MG/DL (ref 70–99)
GLUCOSE BLDC GLUCOMTR-MCNC: 117 MG/DL (ref 70–99)
GLUCOSE BLDC GLUCOMTR-MCNC: 129 MG/DL (ref 70–99)
GLUCOSE BLDC GLUCOMTR-MCNC: 131 MG/DL (ref 70–99)
GLUCOSE BLDC GLUCOMTR-MCNC: 137 MG/DL (ref 70–99)
GLUCOSE BLDC GLUCOMTR-MCNC: 138 MG/DL (ref 70–99)
GLUCOSE BLDC GLUCOMTR-MCNC: 158 MG/DL (ref 70–99)
GLUCOSE BLDC GLUCOMTR-MCNC: 165 MG/DL (ref 70–99)
GLUCOSE BLDC GLUCOMTR-MCNC: 169 MG/DL (ref 70–99)
GLUCOSE BLDC GLUCOMTR-MCNC: 173 MG/DL (ref 70–99)
GLUCOSE BLDC GLUCOMTR-MCNC: 173 MG/DL (ref 70–99)
GLUCOSE BLDC GLUCOMTR-MCNC: 174 MG/DL (ref 70–99)
GLUCOSE BLDC GLUCOMTR-MCNC: 187 MG/DL (ref 70–99)
GRAM STAIN RESULT: ABNORMAL
GRAM STAIN RESULT: ABNORMAL
HCO3 BLD-SCNC: 22 MMOL/L (ref 21–28)
HCO3 BLD-SCNC: 23 MMOL/L (ref 21–28)
HCO3 BLD-SCNC: 24 MMOL/L (ref 21–28)
HCO3 BLD-SCNC: 25 MMOL/L (ref 21–28)
HCO3 BLD-SCNC: 26 MMOL/L (ref 21–28)
HCT VFR BLD AUTO: 30.5 % (ref 40–53)
HGB BLD-MCNC: 9.8 G/DL (ref 13.3–17.7)
INR PPP: 1.19 (ref 0.85–1.15)
INR PPP: 1.25 (ref 0.85–1.15)
LYMPHOCYTES # BLD MANUAL: 0.4 10E3/UL (ref 0.8–5.3)
LYMPHOCYTES NFR BLD MANUAL: 2 %
MAGNESIUM SERPL-MCNC: 1.7 MG/DL (ref 1.6–2.3)
MAGNESIUM SERPL-MCNC: 1.9 MG/DL (ref 1.6–2.3)
MCH RBC QN AUTO: 30.2 PG (ref 26.5–33)
MCHC RBC AUTO-ENTMCNC: 32.1 G/DL (ref 31.5–36.5)
MCV RBC AUTO: 94 FL (ref 78–100)
METAMYELOCYTES # BLD MANUAL: 0.2 10E3/UL
METAMYELOCYTES NFR BLD MANUAL: 1 %
MONOCYTES # BLD MANUAL: 0.9 10E3/UL (ref 0–1.3)
MONOCYTES NFR BLD MANUAL: 4 %
NEUTROPHILS # BLD MANUAL: 19.9 10E3/UL (ref 1.6–8.3)
NEUTROPHILS NFR BLD MANUAL: 93 %
O2/TOTAL GAS SETTING VFR VENT: 50 %
PATH REPORT.COMMENTS IMP SPEC: NORMAL
PATH REPORT.COMMENTS IMP SPEC: NORMAL
PATH REPORT.FINAL DX SPEC: NORMAL
PATH REPORT.GROSS SPEC: NORMAL
PATH REPORT.MICROSCOPIC SPEC OTHER STN: NORMAL
PATH REPORT.RELEVANT HX SPEC: NORMAL
PCO2 BLD: 28 MM HG (ref 35–45)
PCO2 BLD: 31 MM HG (ref 35–45)
PCO2 BLD: 32 MM HG (ref 35–45)
PCO2 BLD: 32 MM HG (ref 35–45)
PCO2 BLD: 33 MM HG (ref 35–45)
PCO2 BLD: 35 MM HG (ref 35–45)
PCO2 BLD: 35 MM HG (ref 35–45)
PH BLD: 7.47 [PH] (ref 7.35–7.45)
PH BLD: 7.48 [PH] (ref 7.35–7.45)
PH BLD: 7.48 [PH] (ref 7.35–7.45)
PH BLD: 7.5 [PH] (ref 7.35–7.45)
PH BLD: 7.51 [PH] (ref 7.35–7.45)
PHOSPHATE SERPL-MCNC: 3 MG/DL (ref 2.5–4.5)
PHOSPHATE SERPL-MCNC: 3.3 MG/DL (ref 2.5–4.5)
PHOTO IMAGE: NORMAL
PLAT MORPH BLD: ABNORMAL
PLATELET # BLD AUTO: 201 10E3/UL (ref 150–450)
PO2 BLD: 101 MM HG (ref 80–105)
PO2 BLD: 108 MM HG (ref 80–105)
PO2 BLD: 131 MM HG (ref 80–105)
PO2 BLD: 136 MM HG (ref 80–105)
PO2 BLD: 176 MM HG (ref 80–105)
PO2 BLD: 67 MM HG (ref 80–105)
PO2 BLD: 83 MM HG (ref 80–105)
POLYCHROMASIA BLD QL SMEAR: ABNORMAL
POTASSIUM BLD-SCNC: 3.4 MMOL/L (ref 3.4–5.3)
POTASSIUM BLD-SCNC: 3.7 MMOL/L (ref 3.4–5.3)
POTASSIUM BLD-SCNC: 4.1 MMOL/L (ref 3.4–5.3)
POTASSIUM BLD-SCNC: 4.2 MMOL/L (ref 3.4–5.3)
POTASSIUM BLD-SCNC: 4.2 MMOL/L (ref 3.4–5.3)
PROT SERPL-MCNC: 5.3 G/DL (ref 6.8–8.8)
PROT SERPL-MCNC: 5.6 G/DL (ref 6.8–8.8)
PROT SERPL-MCNC: 5.9 G/DL (ref 6.8–8.8)
PROT SERPL-MCNC: 5.9 G/DL (ref 6.8–8.8)
RBC # BLD AUTO: 3.24 10E6/UL (ref 4.4–5.9)
RBC MORPH BLD: ABNORMAL
SARS-COV-2 RNA RESP QL NAA+PROBE: NEGATIVE
SODIUM SERPL-SCNC: 145 MMOL/L (ref 133–144)
SODIUM SERPL-SCNC: 146 MMOL/L (ref 133–144)
SODIUM SERPL-SCNC: 147 MMOL/L (ref 133–144)
SPECIMEN EXPIRATION DATE: NORMAL
TACROLIMUS BLD-MCNC: 19.9 UG/L (ref 5–15)
TME LAST DOSE: ABNORMAL H
TME LAST DOSE: ABNORMAL H
UFH PPP CHRO-ACNC: 0.36 IU/ML
UFH PPP CHRO-ACNC: 0.43 IU/ML
WBC # BLD AUTO: 21.4 10E3/UL (ref 4–11)

## 2021-10-24 PROCEDURE — 258N000003 HC RX IP 258 OP 636: Performed by: THORACIC SURGERY (CARDIOTHORACIC VASCULAR SURGERY)

## 2021-10-24 PROCEDURE — 250N000013 HC RX MED GY IP 250 OP 250 PS 637: Performed by: STUDENT IN AN ORGANIZED HEALTH CARE EDUCATION/TRAINING PROGRAM

## 2021-10-24 PROCEDURE — 250N000013 HC RX MED GY IP 250 OP 250 PS 637: Performed by: ANESTHESIOLOGY

## 2021-10-24 PROCEDURE — 84100 ASSAY OF PHOSPHORUS: CPT | Performed by: INTERNAL MEDICINE

## 2021-10-24 PROCEDURE — 200N000002 HC R&B ICU UMMC

## 2021-10-24 PROCEDURE — 999N000157 HC STATISTIC RCP TIME EA 10 MIN

## 2021-10-24 PROCEDURE — 80048 BASIC METABOLIC PNL TOTAL CA: CPT | Performed by: INTERNAL MEDICINE

## 2021-10-24 PROCEDURE — 99291 CRITICAL CARE FIRST HOUR: CPT | Mod: 25 | Performed by: PSYCHIATRY & NEUROLOGY

## 2021-10-24 PROCEDURE — 999N000015 HC STATISTIC ARTERIAL MONITORING DAILY

## 2021-10-24 PROCEDURE — 250N000009 HC RX 250: Performed by: STUDENT IN AN ORGANIZED HEALTH CARE EDUCATION/TRAINING PROGRAM

## 2021-10-24 PROCEDURE — 250N000012 HC RX MED GY IP 250 OP 636 PS 637: Performed by: NURSE PRACTITIONER

## 2021-10-24 PROCEDURE — 85027 COMPLETE CBC AUTOMATED: CPT | Performed by: STUDENT IN AN ORGANIZED HEALTH CARE EDUCATION/TRAINING PROGRAM

## 2021-10-24 PROCEDURE — 250N000009 HC RX 250: Performed by: THORACIC SURGERY (CARDIOTHORACIC VASCULAR SURGERY)

## 2021-10-24 PROCEDURE — 94640 AIRWAY INHALATION TREATMENT: CPT

## 2021-10-24 PROCEDURE — 85610 PROTHROMBIN TIME: CPT | Performed by: INTERNAL MEDICINE

## 2021-10-24 PROCEDURE — U0005 INFEC AGEN DETEC AMPLI PROBE: HCPCS | Performed by: STUDENT IN AN ORGANIZED HEALTH CARE EDUCATION/TRAINING PROGRAM

## 2021-10-24 PROCEDURE — 250N000011 HC RX IP 250 OP 636: Performed by: STUDENT IN AN ORGANIZED HEALTH CARE EDUCATION/TRAINING PROGRAM

## 2021-10-24 PROCEDURE — 250N000013 HC RX MED GY IP 250 OP 250 PS 637: Performed by: NURSE PRACTITIONER

## 2021-10-24 PROCEDURE — 71045 X-RAY EXAM CHEST 1 VIEW: CPT | Mod: 26 | Performed by: STUDENT IN AN ORGANIZED HEALTH CARE EDUCATION/TRAINING PROGRAM

## 2021-10-24 PROCEDURE — 71045 X-RAY EXAM CHEST 1 VIEW: CPT

## 2021-10-24 PROCEDURE — 250N000012 HC RX MED GY IP 250 OP 636 PS 637: Performed by: PHYSICIAN ASSISTANT

## 2021-10-24 PROCEDURE — 84075 ASSAY ALKALINE PHOSPHATASE: CPT | Performed by: INTERNAL MEDICINE

## 2021-10-24 PROCEDURE — 95718 EEG PHYS/QHP 2-12 HR W/VEEG: CPT | Performed by: PSYCHIATRY & NEUROLOGY

## 2021-10-24 PROCEDURE — 85520 HEPARIN ASSAY: CPT | Performed by: STUDENT IN AN ORGANIZED HEALTH CARE EDUCATION/TRAINING PROGRAM

## 2021-10-24 PROCEDURE — 95714 VEEG EA 12-26 HR UNMNTR: CPT

## 2021-10-24 PROCEDURE — 94640 AIRWAY INHALATION TREATMENT: CPT | Mod: 76

## 2021-10-24 PROCEDURE — 250N000009 HC RX 250: Performed by: NURSE PRACTITIONER

## 2021-10-24 PROCEDURE — 80197 ASSAY OF TACROLIMUS: CPT | Performed by: NURSE PRACTITIONER

## 2021-10-24 PROCEDURE — 84450 TRANSFERASE (AST) (SGOT): CPT | Performed by: INTERNAL MEDICINE

## 2021-10-24 PROCEDURE — 83735 ASSAY OF MAGNESIUM: CPT | Performed by: INTERNAL MEDICINE

## 2021-10-24 PROCEDURE — 82803 BLOOD GASES ANY COMBINATION: CPT | Performed by: INTERNAL MEDICINE

## 2021-10-24 PROCEDURE — 250N000011 HC RX IP 250 OP 636: Performed by: ANESTHESIOLOGY

## 2021-10-24 PROCEDURE — 99233 SBSQ HOSP IP/OBS HIGH 50: CPT | Mod: 24 | Performed by: INTERNAL MEDICINE

## 2021-10-24 PROCEDURE — 99291 CRITICAL CARE FIRST HOUR: CPT | Mod: 24 | Performed by: ANESTHESIOLOGY

## 2021-10-24 PROCEDURE — 94003 VENT MGMT INPAT SUBQ DAY: CPT

## 2021-10-24 PROCEDURE — 250N000009 HC RX 250: Performed by: ANESTHESIOLOGY

## 2021-10-24 PROCEDURE — 94668 MNPJ CHEST WALL SBSQ: CPT

## 2021-10-24 PROCEDURE — C9113 INJ PANTOPRAZOLE SODIUM, VIA: HCPCS | Performed by: ANESTHESIOLOGY

## 2021-10-24 PROCEDURE — 82803 BLOOD GASES ANY COMBINATION: CPT | Performed by: SURGERY

## 2021-10-24 PROCEDURE — 82248 BILIRUBIN DIRECT: CPT | Performed by: INTERNAL MEDICINE

## 2021-10-24 PROCEDURE — 86900 BLOOD TYPING SEROLOGIC ABO: CPT | Performed by: STUDENT IN AN ORGANIZED HEALTH CARE EDUCATION/TRAINING PROGRAM

## 2021-10-24 PROCEDURE — 258N000003 HC RX IP 258 OP 636: Performed by: STUDENT IN AN ORGANIZED HEALTH CARE EDUCATION/TRAINING PROGRAM

## 2021-10-24 RX ORDER — POTASSIUM CHLORIDE 29.8 MG/ML
20 INJECTION INTRAVENOUS ONCE
Status: COMPLETED | OUTPATIENT
Start: 2021-10-24 | End: 2021-10-25

## 2021-10-24 RX ORDER — CARVEDILOL 6.25 MG/1
6.25 TABLET ORAL 2 TIMES DAILY
Status: DISCONTINUED | OUTPATIENT
Start: 2021-10-24 | End: 2021-10-25

## 2021-10-24 RX ORDER — ISOSORBIDE DINITRATE 10 MG/1
20 TABLET ORAL ONCE
Status: COMPLETED | OUTPATIENT
Start: 2021-10-24 | End: 2021-10-24

## 2021-10-24 RX ORDER — HYDRALAZINE HYDROCHLORIDE 25 MG/1
25 TABLET, FILM COATED ORAL ONCE
Status: COMPLETED | OUTPATIENT
Start: 2021-10-24 | End: 2021-10-24

## 2021-10-24 RX ORDER — ACETAMINOPHEN 325 MG/1
975 TABLET ORAL EVERY 8 HOURS SCHEDULED
Status: DISCONTINUED | OUTPATIENT
Start: 2021-10-24 | End: 2021-11-03

## 2021-10-24 RX ORDER — BUMETANIDE 0.25 MG/ML
2 INJECTION INTRAMUSCULAR; INTRAVENOUS
Status: DISCONTINUED | OUTPATIENT
Start: 2021-10-24 | End: 2021-10-30

## 2021-10-24 RX ADMIN — BUMETANIDE 2 MG: 0.25 INJECTION, SOLUTION INTRAMUSCULAR; INTRAVENOUS at 07:46

## 2021-10-24 RX ADMIN — LEVALBUTEROL HYDROCHLORIDE 1.25 MG: 1.25 SOLUTION RESPIRATORY (INHALATION) at 15:21

## 2021-10-24 RX ADMIN — BUMETANIDE 2 MG: 0.25 INJECTION, SOLUTION INTRAMUSCULAR; INTRAVENOUS at 12:06

## 2021-10-24 RX ADMIN — HYDRALAZINE HYDROCHLORIDE 75 MG: 50 TABLET, FILM COATED ORAL at 14:31

## 2021-10-24 RX ADMIN — PANTOPRAZOLE SODIUM 40 MG: 40 INJECTION, POWDER, FOR SOLUTION INTRAVENOUS at 07:46

## 2021-10-24 RX ADMIN — CARVEDILOL 6.25 MG: 6.25 TABLET, FILM COATED ORAL at 19:55

## 2021-10-24 RX ADMIN — OXYCODONE HYDROCHLORIDE 5 MG: 5 TABLET ORAL at 18:31

## 2021-10-24 RX ADMIN — BUMETANIDE 2 MG: 0.25 INJECTION, SOLUTION INTRAMUSCULAR; INTRAVENOUS at 18:02

## 2021-10-24 RX ADMIN — MYCOPHENOLATE MOFETIL 1500 MG: 200 POWDER, FOR SUSPENSION ORAL at 19:56

## 2021-10-24 RX ADMIN — CARVEDILOL 6.25 MG: 6.25 TABLET, FILM COATED ORAL at 09:57

## 2021-10-24 RX ADMIN — ESMOLOL HYDROCHLORIDE 50 MCG/KG/MIN: 20 INJECTION INTRAVENOUS at 10:07

## 2021-10-24 RX ADMIN — ROSUVASTATIN CALCIUM 10 MG: 10 TABLET, FILM COATED ORAL at 07:47

## 2021-10-24 RX ADMIN — OXYCODONE HYDROCHLORIDE 10 MG: 10 TABLET ORAL at 23:22

## 2021-10-24 RX ADMIN — POTASSIUM CHLORIDE 20 MEQ: 40 SOLUTION ORAL at 07:47

## 2021-10-24 RX ADMIN — ACETYLCYSTEINE 2 ML: 200 SOLUTION ORAL; RESPIRATORY (INHALATION) at 15:21

## 2021-10-24 RX ADMIN — MYCOPHENOLATE MOFETIL 1500 MG: 200 POWDER, FOR SUSPENSION ORAL at 07:50

## 2021-10-24 RX ADMIN — AMLODIPINE BESYLATE 10 MG: 10 TABLET ORAL at 07:47

## 2021-10-24 RX ADMIN — CALCIUM CARBONATE 600 MG (1,500 MG)-VITAMIN D3 400 UNIT TABLET 1 TABLET: at 18:02

## 2021-10-24 RX ADMIN — AMIODARONE HYDROCHLORIDE 400 MG: 200 TABLET ORAL at 19:55

## 2021-10-24 RX ADMIN — LEVALBUTEROL HYDROCHLORIDE 1.25 MG: 1.25 SOLUTION RESPIRATORY (INHALATION) at 11:39

## 2021-10-24 RX ADMIN — Medication 1 PACKET: at 07:49

## 2021-10-24 RX ADMIN — ACETYLCYSTEINE 2 ML: 200 SOLUTION ORAL; RESPIRATORY (INHALATION) at 20:28

## 2021-10-24 RX ADMIN — ESMOLOL HYDROCHLORIDE 50 MCG/KG/MIN: 20 INJECTION INTRAVENOUS at 05:15

## 2021-10-24 RX ADMIN — AMIODARONE HYDROCHLORIDE 400 MG: 200 TABLET ORAL at 07:47

## 2021-10-24 RX ADMIN — HYDRALAZINE HYDROCHLORIDE 75 MG: 50 TABLET, FILM COATED ORAL at 21:52

## 2021-10-24 RX ADMIN — ISOSORBIDE DINITRATE 20 MG: 10 TABLET ORAL at 09:57

## 2021-10-24 RX ADMIN — HYDRALAZINE HYDROCHLORIDE 50 MG: 50 TABLET, FILM COATED ORAL at 05:15

## 2021-10-24 RX ADMIN — PREDNISONE 15 MG: 5 TABLET ORAL at 07:46

## 2021-10-24 RX ADMIN — ACETAMINOPHEN 975 MG: 325 TABLET, FILM COATED ORAL at 09:57

## 2021-10-24 RX ADMIN — NYSTATIN 1000000 UNITS: 500000 SUSPENSION ORAL at 19:55

## 2021-10-24 RX ADMIN — NYSTATIN 1000000 UNITS: 500000 SUSPENSION ORAL at 07:47

## 2021-10-24 RX ADMIN — ACYCLOVIR 400 MG: 200 SUSPENSION ORAL at 19:55

## 2021-10-24 RX ADMIN — HEPARIN SODIUM 700 UNITS/HR: 10000 INJECTION, SOLUTION INTRAVENOUS at 10:06

## 2021-10-24 RX ADMIN — ACYCLOVIR 400 MG: 200 SUSPENSION ORAL at 07:51

## 2021-10-24 RX ADMIN — MICAFUNGIN SODIUM 100 MG: 50 INJECTION, POWDER, LYOPHILIZED, FOR SOLUTION INTRAVENOUS at 09:59

## 2021-10-24 RX ADMIN — ISOSORBIDE DINITRATE 40 MG: 10 TABLET ORAL at 18:02

## 2021-10-24 RX ADMIN — ACETAMINOPHEN 975 MG: 325 TABLET, FILM COATED ORAL at 18:02

## 2021-10-24 RX ADMIN — LEVALBUTEROL HYDROCHLORIDE 1.25 MG: 1.25 SOLUTION RESPIRATORY (INHALATION) at 20:28

## 2021-10-24 RX ADMIN — ACETYLCYSTEINE 2 ML: 200 SOLUTION ORAL; RESPIRATORY (INHALATION) at 11:39

## 2021-10-24 RX ADMIN — ISOSORBIDE DINITRATE 40 MG: 10 TABLET ORAL at 12:06

## 2021-10-24 RX ADMIN — NYSTATIN 1000000 UNITS: 500000 SUSPENSION ORAL at 16:04

## 2021-10-24 RX ADMIN — MULTIVIT AND MINERALS-FERROUS GLUCONATE 9 MG IRON/15 ML ORAL LIQUID 15 ML: at 07:47

## 2021-10-24 RX ADMIN — LEVALBUTEROL HYDROCHLORIDE 1.25 MG: 1.25 SOLUTION RESPIRATORY (INHALATION) at 07:22

## 2021-10-24 RX ADMIN — LEVOTHYROXINE SODIUM 25 MCG: 0.03 TABLET ORAL at 07:47

## 2021-10-24 RX ADMIN — NICARDIPINE HYDROCHLORIDE 5 MG/HR: 2.5 INJECTION INTRAVENOUS at 14:50

## 2021-10-24 RX ADMIN — Medication 1 PACKET: at 19:59

## 2021-10-24 RX ADMIN — PREDNISONE 15 MG: 5 TABLET ORAL at 19:55

## 2021-10-24 RX ADMIN — NYSTATIN 1000000 UNITS: 500000 SUSPENSION ORAL at 12:06

## 2021-10-24 RX ADMIN — HYDRALAZINE HYDROCHLORIDE 25 MG: 25 TABLET, FILM COATED ORAL at 09:57

## 2021-10-24 RX ADMIN — CALCIUM CARBONATE 600 MG (1,500 MG)-VITAMIN D3 400 UNIT TABLET 1 TABLET: at 07:47

## 2021-10-24 RX ADMIN — POTASSIUM CHLORIDE 20 MEQ: 29.8 INJECTION, SOLUTION INTRAVENOUS at 23:13

## 2021-10-24 RX ADMIN — ACETYLCYSTEINE 2 ML: 200 SOLUTION ORAL; RESPIRATORY (INHALATION) at 07:23

## 2021-10-24 RX ADMIN — PANTOPRAZOLE SODIUM 40 MG: 40 INJECTION, POWDER, FOR SOLUTION INTRAVENOUS at 16:04

## 2021-10-24 RX ADMIN — METHOCARBAMOL 750 MG: 750 TABLET ORAL at 23:22

## 2021-10-24 RX ADMIN — HUMAN INSULIN 4 UNITS/HR: 100 INJECTION, SOLUTION SUBCUTANEOUS at 04:15

## 2021-10-24 RX ADMIN — ISOSORBIDE DINITRATE 20 MG: 10 TABLET ORAL at 07:47

## 2021-10-24 ASSESSMENT — ACTIVITIES OF DAILY LIVING (ADL)
ADLS_ACUITY_SCORE: 20
ADLS_ACUITY_SCORE: 18
ADLS_ACUITY_SCORE: 22
ADLS_ACUITY_SCORE: 18
ADLS_ACUITY_SCORE: 22
ADLS_ACUITY_SCORE: 18
ADLS_ACUITY_SCORE: 22
ADLS_ACUITY_SCORE: 18
ADLS_ACUITY_SCORE: 22
ADLS_ACUITY_SCORE: 22
ADLS_ACUITY_SCORE: 20
ADLS_ACUITY_SCORE: 22
ADLS_ACUITY_SCORE: 18
ADLS_ACUITY_SCORE: 20
ADLS_ACUITY_SCORE: 18
ADLS_ACUITY_SCORE: 22
ADLS_ACUITY_SCORE: 20
ADLS_ACUITY_SCORE: 18
ADLS_ACUITY_SCORE: 22
ADLS_ACUITY_SCORE: 22
ADLS_ACUITY_SCORE: 18
ADLS_ACUITY_SCORE: 22

## 2021-10-24 ASSESSMENT — MIFFLIN-ST. JEOR: SCORE: 1463

## 2021-10-24 NOTE — PLAN OF CARE
Pt completed EEG, no seizure activity noted. Pt wiggled R and L toes, not purposeful though. Did not withdraw to pain. PERRLA. Opens eyes spontaneously, but does not track. Weaned off esmolol around 1230. Nicardipine shut off at 1820 d/t pt abruptly dropping BP. Previously -140s, dropped to 80-90s with MAPs in low 60s. CVTS aware, advised to continue monitoring pt. Pt HR increased throughout shift, now ST in low 100s. On PS for most of the afternoon, 10/5. Placed back on CMV at 50% with PEEP of 5. Pt ABG trending more alkalotic throughout shift, RR in low 30s. Fentanyl weaned off around 1230. Oxycodone given this evening to help with possible pain and RR rate. Will re-check ABG later this evening. Minimal output from chest tubes. Heparin continues at 700. D10 and insulin gtt shut off, -180s. Trickle feeds advanced to 20mL/hr. Plan for NJ placement and bronch tomorrow.

## 2021-10-24 NOTE — PROGRESS NOTES
EEG CLINICAL NEUROPHYSIOLOGY PRELIMINARY REPORT    EEG through 4 AM today reviewed.  No normal background.  Near continuous irregular moderate to high amplitude delta, especially in the frontal regions.  No clear reactivity.  Rare generalized sharp waves are probably part of the patient's encephalopathy.  Focal epileptiform activity is not seen.    Study consistent with moderate to severe diffuse encephalopathy.  No epileptiform discharges or seizures.  No evidence of nonconvulsive status epilepticus.    Full report to follow.  Giovany Greenfield MD  Contact information for physicians covering Epilepsy and EEG is available on [facility].  Click search, enter neurology adult/[facility] in group name box, click on neurology adult/[facility], then click Staff Epilepsy and EEG.

## 2021-10-24 NOTE — PROGRESS NOTES
[facility]    Stroke Progress Note    Interval EventsEEG disconnected today.  Diffuse encephalopathy without epileptiform discharges.      HPI Summary  Edson Thornton is a 56 year old male with with Afib, HTN, RA, SALTY, and ILD s/p lung transplant 10/16/21.  Per primary team he has had significant difficulty with ventilation required paralytics and sedation, however, he has typically moved all his extremities with full strength.  It was noted on 10/21 that he was no longer moving his lower extremities.  Stroke code activated on morning of 10/22 for persistent neurologic change.      Stroke Evaluation Summarized    MRI/Head CT MRI Brain:  Impression:  Multifocal subacute infarcts within both cerebral hemispheres and left cerebellum all of which appear present on prior head CT 10/22/2021.  Minimal petechial hemorrhage associated with the infarct in the leftoccipital lobe.     Head CT: multiple scattered subacute ischemic strokes, predating exam change from 10/22.  May contribute to encephalopathy, but would not explain lack of extremity movement.    Intracranial Vasculature Head CTA demonstrates no aneurysm or stenosis of major intracranial arteries.    CT perfusion - focally decreased cerebral blood volume and cerebral blood flow in the posterior most left parieto-occipital region with corresponding slight increase in TTP possibly representing an area of hypoperfusion or core infarct.    Cervical Vasculature No stenosis of major cervical arteries     Echocardiogram TC reported no valvular vegetations and no pulmonary vein thrombosis per primary team.   EKG/Telemetry Sinus tachycardia   Otherwise normal ECG   When compared with ECG of 21-OCT-2021 06:50,   ST no longer depressed in Anterior leads   ST no longer elevated in Lateral leads    Other Testing EEG showed no epileptiform discharges.  Positive for diffuse encephalopathy.      LDL  No lab value available in past  30 days   A1C  No lab value available in past 30 days   Troponin No lab value available in past 48 hrs       Impression     #Acute to subacute ischemic stroke of bilateral cerebral hemispheres and left cerebellum with suspected cardioembolic etiology  #Encephalopathy and diffuse weakness  #Sustained ankle clonus, resolved  Pt was initially moving all extremities and agitation requiring restraints.  On the morning of 10/22, a stroke code was called due to unresponsiveness and the patient not moving his extremities.  Head CT showed multiple ischemic infarcts in bilateral cerebral hemispheres and left cerebellum with consistent CT perfusion studies.   - Sustained clonus seen on exam on 10/22 concerning for cervical myelopathy.  Self resolved and not seen on exam on 10/23.  CT cervical spine showed no acute fracture or traumatic subluxation.   - TC shows no valvular vegetations and no pulmonary vein thrombosis per primary team.  - EEG preliminarily shows diffuse encephalopathy, no epileptiform discharges to date.  We will discontinue EEG.    - Heparin gtt for anticoagulation.  - No need for ASA.  - MRI Brain: consistent with initial head CT with stable infarcts.   - Recommend supportive treatment.     Differential includes: septic embolism, infectious encephalopathy, metabolic encephalopathy, ICU delirium, anoxic brain injury.  Presentation and clinical picture likely multifactorial.     ______________________________________________________    Clinically Significant Risk Factors Present on Admission                  Medications   Scheduled Meds    acetaminophen  975 mg Oral or Feeding Tube Q8H MARKY     acetylcysteine  2 mL Nebulization 4x Daily     acyclovir  400 mg Oral or NG Tube BID     amiodarone  400 mg Oral BID     amLODIPine  10 mg Oral or Feeding Tube Daily     bumetanide  2 mg Intravenous TID     calcium carbonate 600 mg-vitamin D 400 units  1 tablet Oral or Feeding Tube BID w/meals     carvedilol  6.25 mg  Oral or Feeding Tube BID     hydrALAZINE  75 mg Oral or Feeding Tube Q8H MARKY     isosorbide dinitrate  40 mg Oral or Feeding Tube TID     levalbuterol  1.25 mg Nebulization 4x Daily     levothyroxine  25 mcg Oral Daily     micafungin  100 mg Intravenous Q24H     multivitamins w/minerals  15 mL Per Feeding Tube Daily     mycophenolate  1,500 mg Oral or NG Tube BID     nystatin  1,000,000 Units Swish & Swallow 4x Daily     pantoprazole (PROTONIX) IV  40 mg Intravenous BID AC     polyethylene glycol  17 g Oral or Feeding Tube Daily     potassium chloride  20 mEq Oral Daily     predniSONE  15 mg Per Feeding Tube BID     protein modular  1 packet Per Feeding Tube BID     rosuvastatin  10 mg Oral or Feeding Tube Daily     senna-docusate  1 tablet Oral or Feeding Tube BID     sodium chloride (PF)  3 mL Intracatheter Q8H     sulfamethoxazole-trimethoprim  10 mL Oral or NG Tube Daily    Or     sulfamethoxazole-trimethoprim  1 tablet Oral or NG Tube Daily       Infusion Meds    dextrose Stopped (10/24/21 1008)     dextrose       esmolol 50 mcg/kg/min (10/24/21 1100)     fentaNYL 25 mcg/hr (10/24/21 1100)     heparin 700 Units/hr (10/24/21 1100)     insulin regular Stopped (10/24/21 0842)     niCARdipine (CARDENE) infusion ADULT/PEDS GREATER than or EQUAL to 45 kg max conc 5 mg/hr (10/24/21 0900)     BETA BLOCKER NOT PRESCRIBED         PRN Meds  bisacodyl, dextrose, dextrose, glucose **OR** dextrose **OR** glucagon, diphenhydrAMINE **OR** diphenhydrAMINE, fentaNYL, HYDROmorphone **OR** HYDROmorphone, lidocaine 4%, lidocaine (buffered or not buffered), magnesium hydroxide, methocarbamol, naloxone **OR** naloxone **OR** naloxone **OR** naloxone, ondansetron **OR** ondansetron, oxyCODONE **OR** oxyCODONE, prochlorperazine **OR** prochlorperazine, BETA BLOCKER NOT PRESCRIBED, sodium chloride (PF)       PHYSICAL EXAMINATION  Temp:  [97.9  F (36.6  C)-98.8  F (37.1  C)] 98.4  F (36.9  C)  Pulse:  [64-89] 88  Resp:  [21-30] 30  MAP:   [34 mmHg-95 mmHg] 78 mmHg  Arterial Line BP: ()/(45-86) 129/59  FiO2 (%):  [50 %] 50 %  SpO2:  [96 %-100 %] 98 %      Mental Status:  Intubated, does not follow commands, opens eyes to loud voice and noxious but does not track or attend to examiner.  Cranial Nerves:  PERRL, does not blink to threat bilaterally, corneal reflex intact bilaterally, face appears grossly symmetric  Motor:  does not move any extremity to deep noxious stimuli, moves head slightly to noxious stimuli.   Reflexes:  Reflexes brisk globally.   Sensory:  Does not withdraw from noxious stimuli in all four extremities.   Coordination:  unable to assess  Station/Gait:  deferred    Stroke Scales    NIHSS  Interval     Interval Comments     1a. Level of Consciousness 3-->Responds only with reflex motor or autonomic effects or totally unresponsive, flaccid, and areflexic   1b. LOC Questions 2-->Answers neither question correctly   1c. LOC Commands 2-->Performs neither task correctly   2.   Best Gaze 0-->Normal   3.   Visual 2-->Complete hemianopia   4.   Facial Palsy 0-->Normal symmetrical movements   5a. Motor Arm, Left 4-->No movement   5b. Motor Arm, Right 4-->No movement   6a. Motor Leg, Left 4-->No movement   6b. Motor Leg, right 4-->No movement   7.   Limb Ataxia 0-->Absent   8.   Sensory 2-->Severe to total sensory loss, patient is not aware of being touched in the face, arm, and leg   9.   Best Language 0-->No aphasia, normal (intubated)   10. Dysarthria (UN) Intubated or other physical barrier   11. Extinction and Inattention  0-->No abnormality   Total 27 (10/22/21 1033)       Imaging  I personally reviewed all imaging; relevant findings per HPI.     Lab Results Data   CBC  Recent Labs   Lab 10/24/21  0410 10/23/21  0344 10/22/21  2013 10/22/21  1407 10/22/21  0958   WBC 21.4* 21.3*  --   --  20.7*   RBC 3.24* 3.21*  --   --  2.96*   HGB 9.8* 9.6* 9.0*   < > 9.1*   HCT 30.5* 30.2*  --   --  28.0*    132*  --   --  112*    < > =  values in this interval not displayed.     Basic Metabolic Panel    Recent Labs   Lab 10/24/21  1008 10/24/21  1004 10/24/21  0901 10/24/21  0514 10/24/21  0409 10/24/21  0009 10/23/21  2249   NA  --  146*  --   --  145*  --  146*   POTASSIUM  --  4.2  --   --  4.2  --  3.4   CHLORIDE  --  112*  --   --  111*  --  109   CO2  --  26  --   --  30  --  30   BUN  --  60*  --   --  61*  --  68*   CR  --  1.00  --   --  1.06  --  1.11   * 202* 165*   < > 123*  106*   < > 159*   ERIK  --  8.8  --   --  8.6  --  8.3*    < > = values in this interval not displayed.     Liver Panel  Recent Labs   Lab 10/24/21  0409 10/23/21  2249 10/23/21  1543   PROTTOTAL 5.6* 5.3* 5.7*   ALBUMIN 1.4* 1.3* 1.4*   BILITOTAL 0.2 0.2 0.2   ALKPHOS 160* 159* 166*   AST 20 21 22   * 164* 183*     INR    Recent Labs   Lab Test 10/24/21  0409 10/23/21  1543 10/23/21  0344   INR 1.25* 1.27* 1.41*      Lipid Profile    Recent Labs   Lab Test 10/22/21  0407 10/21/21  0354 10/20/21  0308 10/19/21  0338 09/07/21  1324   CHOL  --   --   --   --  214*   HDL  --   --   --   --  51   LDL  --   --   --   --  130*   TRIG 307* 486* 383*   < > 364*    < > = values in this interval not displayed.     A1C    Recent Labs   Lab Test 09/07/21  0928 09/05/21  1901   A1C 6.6* 6.5*     Troponin I    Recent Labs   Lab 10/22/21  0958   TROPONIN 0.807*

## 2021-10-24 NOTE — PLAN OF CARE
Major Shift Events:  Opens eyes spontaneously and in response to name.  Wiggled toes of left foot and moved it up and down this AM but not in response to stimuli.  Does not follow commands.    Plan: Continue to monitor neuro status.  For vital signs and complete assessments, please see documentation flowsheets.

## 2021-10-24 NOTE — CONSULTS
OPHTHALMOLOGY CONSULT NOTE      Patient: Edson Thornton  Consulted by: MICU  Reason for Consult: candidemia    ASSESSMENT/PLAN:     Edson Thornton is a 56 year old male who presents with:    # Chronic immunosuppression for bilateral lung transplant for NSIP/ILD  # Candidemia  # +HSV without hx of ocular manifestation  Benign dilated fundoscopic exam today without evidence of infection of any kind  Unable to obtain subjective visual complaints due to sedation and intubation  CMV levels unknown    Plan:  - follow up with ophthalmology outpatient in 4-8 wks for repeat evaluation or sooner if develops visual complaints or concerns  - we will sign off for now; please re-page to alert us of any questions or concerns    It is our pleasure to participate in this patient's care and treatment. Please contact us with any further questions or concerns.      Margarita Falk MD  PGY-3 Resident Physician  Department of Ophthalmology  Pager: [phone]    HISTORY OF PRESENTING ILLNESS:     Edson Thornton is a 56 year old male with a PMH significant for chronic immunosuppression for NSIP/ILD, RA, SALTY, chronic HSV infection, hypogammaglobulinemia, steroid-induced diabetes, hypothyroidism, HTN, HLD, duodenal anomaly, anxiety, and depression.  Admitted on 9/5/21 from OSH for acute on chronic respiratory failure 2/2 ILD exacerbation.  Pt. is now s/p BSLT on 10/16/21.  The patient remains intubated on Caroline in the CVICU, difficulty with weaning sedation. Pt is currently on tacrolimus, MMF, and prednisolone. Receving basiliximab.     Unable to obtain ROS    OCULAR/MEDICAL/SURGICAL HISTORIES:     Past Ocular History: unknown    Past Medical History:   Diagnosis Date     BRENNAN (acute kidney injury) (H) 10/17/2021     Anxiety      Depression      HLD (hyperlipidemia)      HTN (hypertension)      Hypothyroidism      ILD (interstitial lung disease) (H)      SALTY on CPAP      Oxygen dependent     BL 4L since ~6/2021     Rheumatoid arthritis (H)      signs ~5/2020, dx 5/2021     S/P lung transplant (H) 10/16/2021     Shock liver 10/17/2021     Steroid-induced hyperglycemia      Traction bronchiectasis (H)      Pertinent FHx of ocular disease: unkonwn  Shx: unkonwn    EXAMINATION:     Base Eye Exam     Tonometry (Tonopen, 1:24 PM)       Right Left    Pressure 13 13          Pupils       Pupils Dark Light Shape React APD    Right PERRL 4 3 Round Brisk None    Left PERRL 4 3 Round Brisk None          Neuro/Psych    Intubated and sedated           Dilation     Both eyes: 1.0% Mydriacyl, 2.5% Shawn Synephrine @ 1:23 PM            Slit Lamp and Fundus Exam     External Exam       Right Left    External Normal Normal          Slit Lamp Exam       Right Left    Lids/Lashes Normal Normal    Conjunctiva/Sclera inf chemosis, white inf chemosis, white    Cornea Clear Clear    Anterior Chamber Deep and quiet Deep and quiet    Iris Round and reactive Round and reactive    Lens 1+ NS 1+ NS    Vitreous Normal Normal          Fundus Exam       Right Left    Disc temporal thinning, tilted Normal    Macula Normal Normal    Vessels Normal Normal    Periphery Normal Normal                Labs/Studies/Imaging Performed:  Hemoglobin A1C   Date Value Ref Range Status   09/07/2021 6.6 (H) 0.0 - 5.6 % Final     Comment:     Normal <5.7%   Prediabetes 5.7-6.4%    Diabetes 6.5% or higher     Note: Adopted from ADA consensus guidelines.     Lab Results   Component Value Date    WBC 21.4 10/24/2021     Lab Results   Component Value Date    HGB 9.8 10/24/2021     Lab Results   Component Value Date     10/24/2021     Sodium   Date Value Ref Range Status   10/24/2021 146 (H) 133 - 144 mmol/L Final     Potassium   Date Value Ref Range Status   10/24/2021 4.1 3.4 - 5.3 mmol/L Final     Glucose   Date Value Ref Range Status   10/24/2021 185 (H) 70 - 99 mg/dL Final     GLUCOSE BY METER POCT   Date Value Ref Range Status   10/24/2021 173 (H) 70 - 99 mg/dL Final     Urea Nitrogen   Date Value  Ref Range Status   10/24/2021 63 (H) 7 - 30 mg/dL Final     Creatinine   Date Value Ref Range Status   10/24/2021 1.02 0.66 - 1.25 mg/dL Final     GFR Estimate   Date Value Ref Range Status   10/24/2021 82 >60 mL/min/1.73m2 Final     Comment:     As of July 11, 2021, eGFR is calculated by the CKD-EPI creatinine equation, without race adjustment. eGFR can be influenced by muscle mass, exercise, and diet. The reported eGFR is an estimation only and is only applicable if the renal function is stable.     Alkaline Phosphatase   Date Value Ref Range Status   10/24/2021 172 (H) 40 - 150 U/L Final     ALT   Date Value Ref Range Status   10/24/2021 139 (H) 0 - 70 U/L Final     AST   Date Value Ref Range Status   10/24/2021 20 0 - 45 U/L Final     Lab Results   Component Value Date    TSH 0.11 09/07/2021     MR Brain w/o & w Contrast    Result Date: 10/23/2021   MR BRAIN W/O & W CONTRAST 10/23/2021 7:33 PM Provided History: Mental status change, unknown cause. ICD-10: Altered mental status Comparison: CTA head 10/22/2021. Technique: Multiplanar T1-weighted, axial FLAIR, and susceptibility images were obtained without intravenous contrast. Following intravenous gadolinium-based contrast administration, axial T2-weighted, diffusion, and T1-weighted images (in multiple planes) were obtained. Contrast: 6.8 mL Gadavist Findings: Diffusion-weighted imaging demonstrates multiple areas of diffusion restriction within the left occipital lobe, right occipital lobe, bilateral frontal and parietal lobes, and left cerebellum, all of which correlate to areas of hypoattenuation seen on prior CT 10/22/2021. These demonstrate high T2 signal on FLAIR imaging and low ADC signal. Corresponding effacement of the cerebral sulci. Small areas of intrinsic T1 hyperintensity within the larger areas of infarct. Punctate regions of susceptibility effect in both cerebral hemispheres, greatest in the region of left occipital infarct. Postcontrast  T1-weighted spin-echo images demonstrate patchy regions of enhancement corresponding to areas of restricted diffusion. Scattered periventricular T2 hyperintensities without associated diffusion restriction likely the sequelae of chronic small vessel ischemic disease. The ventricles are proportionate to the cerebral sulci. Normal major vascular intracranial flow-voids. Normal calvarial marrow signal. Mild paranasal sinus mucosal thickening. Extensive bilateral mastoid effusions. The orbits are grossly unremarkable.     Impression: Multifocal subacute infarcts within both cerebral hemispheres and left cerebellum all of which appear present on prior head CT 10/22/2021. Minimal petechial hemorrhage associated with the infarct in the left occipital lobe. I have personally reviewed the examination and initial interpretation and I agree with the findings. ROSS SANTIAGO MD   SYSTEM ID:  [id number]    Echo Limited    Result Date: 10/23/2021  [id number]^[id number]  [facility],[place] [facility] [address] [place] [id number]  Name: SHAYNA GUERIRER MRN: [id number] : 1965 Study Date: 10/23/2021 08:38 AM Age: 56 yrs Gender: Male Patient Location: [facility] Reason For Study: Endocarditis Ordering Physician: MECHELLE HURT Referring Physician: SAUNDRA GILL Performed By: Ayaka Tong RDCS  BSA: 1.8 m2 Height: 64 in Weight: 164 lb HR: 68 BP: 97/60 mmHg ______________________________________________________________________________ Procedure Limited Portable Echo Adult. ______________________________________________________________________________ Interpretation Summary Limited echo for endocarditis  Valves cannot be assessed in this echo due to bandages on the chest.  Would consider a transesophageal echocardiogram if clinically indicated. ______________________________________________________________________________ Right Ventricle Global right  ventricular function is normal. ______________________________________________________________________________ Report approved by: Micaela ARTEAGA 10/23/2021 10:06 AM  ______________________________________________________________________________      CT Abdomen Pelvis w Contrast    Result Date: 10/22/2021  EXAMINATION: CT ABDOMEN PELVIS W CONTRAST, CT CHEST PULMONARY EMBOLISM W CONTRAST, 10/22/2021 1:07 PM TECHNIQUE:  Helical CT images from the lung apices through the symphysis pubis were obtained with IV contrast. CTPA of the chest, contrast bolus also used for concurrently acquired CT abdomen pelvis. COMPARISON: Same day radiograph. CT chest same day, and 9/30/2021. HISTORY: Evaluate for fluid in pelvis or pathology in abdomen. Bilateral lung transplant performed 10/16/2021. FINDINGS: Support devices: Endotracheal tube terminates in the low thoracic trachea. Gastric tube tip is in the stomach. Feeding tube tip is in the proximal jejunum. Right basilar chest tube. Right apical chest tube. Left basilar chest tube. A left apical chest tube. Mediastinal drains. Right arm PICC terminates in the SVC. Right IJ approach [place]-Sintia catheter tip is in the main pulmonary artery. Chest: No pulmonary embolus. Surgical changes of bilateral lung transplantation. No adenopathy. Esophagus is unremarkable. Trachea and central airways are clear of debris. Trace fluid in the air in the pleural spaces bilaterally, with chest tubes in place as described. Patchy opacities in the lung bases with more confluence consolidative appearance containing air bronchograms in the right lower lobe, and left upper lobe along the interlobar fissure similar to prior exam. Abdomen and pelvis: Subcentimeter hypodensity in the central right hepatic lobe similar to prior exams. Gallbladder, bile ducts, pancreas, spleen adrenal glands, kidneys all are unremarkable. Urinary bladder is decompressed around a Watson catheter bulb, with small amount of contrast  remaining within the bladder. Enteric contrast throughout the colon. Colonic diverticulosis without acute inflammation. Appendix is normal. No free fluid or free air. No abdominal pelvic lymphadenopathy. Major vessels are normal. Bones and soft tissues: No acute or suspicious osseous lesion. Clamshell sternotomy wires. Expected air in the soft tissues overlying the upper chest and neck similar to prior.     IMPRESSION: 1. No acute pulmonary embolus. 2. Surgical changes of bilateral lung transplantation are stable compared to exam performed earlier today. Similar appearance of opacities throughout both lungs, likely representing atelectasis and/or consolidation. 3. No acute abnormality in the abdomen or pelvis.  I have personally reviewed the examination and initial interpretation and I agree with the findings. FITO PERALES MD   SYSTEM ID:  [id number]    CTA Head Neck with Contrast    Result Date: 10/22/2021  CTA  HEAD NECK WITH CONTRAST, CT HEAD PERFUSION WITH CONTRAST 10/22/2021 8:35 AM CT angiogram of the neck CT angiogram of the base of the brain with contrast Reconstruction on the 3D workstation Provided History:  Stroke/TIA, assess intracranial arteries; Stroke/TIA, assess extracranial arteries; Unresponsiveness, CODE STROKE Comparison:  CT 10/22/2021.  Technique: HEAD and NECK CTA: During rapid bolus intravenous injection of nonionic contrast material, axial images were obtained using thin collimation multidetector helical technique from the base of the upper aortic arch through the [place] of [place]. This CT angiogram data was reconstructed at thin intervals with mild overlap. Images were sent to the 3D workstation, and 3D reconstructions were obtained. The axial source images, multiplanar reformations, 3D reconstructions in both maximum intensity projection display and volume rendered models were reviewed, with reconstructions performed by the technologist. CT Perfusion CT BRAIN PERFUSION: Dynamic perfusion  CT of the brain was performed at multiple levels; perfusion was measured and imaged during rapid bolus intravenous injection of nonionic iodinated contrast medium. Images were post-processed, reconstructed, and reviewed. Contrast: iopamidol (ISOVUE-370) solution 115 mL Findings: CTA: Head CTA demonstrates no aneurysm or stenosis of the major intracranial arteries. Neck CTA demonstrates no stenosis of the major cervical arteries. The origins of the great vessels from the aortic arch are patent. The normal distal right internal carotid artery measures 5 mm. The normal distal left internal carotid artery measures 5 mm. Postsurgical changes of the chest with subcutaneous emphysema extending along the anterior chest wall and into the neck. Partially visualized bilateral chest tubes. ET tube tip in the upper thoracic trachea. Partially visualized nasoenteric tube.. CT Perfusion: CT Perfusion: Focally decreased cerebral blood volume and cerebral blood flow in the posterior most left parieto-occipital region with corresponding slight increase in TTP possibly representing an area of hypoperfusion or core infarct. Rapid data processing fail due to no suitable area for location being identified.     Impression:  1. Head CTA demonstrates no aneurysm or stenosis of the major intracranial arteries. 2. Neck CTA demonstrates no stenosis of the major cervical arteries. 3. CT perfusion demonstrates focally asymmetric decrease in CBF and CBV with a corresponding increase in MTT at the posterior most left parieto-occipital region suggestive of core infarct, corresponding to same-day CT findings. Rapid data processing failed and so core infarct volume, hypoperfusion volume, and mismatch volume are not calculated. Findings were discussed by Dr. Terrell of radiology with the stroke team at approximately 9:10 AM on 10/22/2021. I have personally reviewed the examination and initial interpretation and I agree with the findings. CAN  MD ADINA   SYSTEM ID:  [id number]    CT Cervical Spine w Contrast    Result Date: 10/22/2021  CT CERVICAL SPINE W CONTRAST 10/22/2021 10:43 AM Provided History: Bilateral LE clonus, not moving extremities Comparison: None available Technique: Reconstructions are performed from a CTA of the head and neck. Findings: The cervical vertebrae are normally aligned. Loss of normal cervical lordosis. No acute fracture or subluxation. No prevertebral edema. Multilevel degenerative osteophytosis. Multilevel mild disc height narrowing with moderate disc height narrowing at C5-6 and C6-7. The findings on a level by level basis are as follows: C2-3:  No spinal canal or neural foraminal stenosis. C3-4:  Bilateral uncinate spurring. No spinal canal narrowing or neural foraminal stenosis. C4-5:  Right facet arthropathy. Bilateral uncinate spurring. Mild right neural foraminal narrowing. Mild spinal canal stenosis. C5-6:  Bilateral uncinate spurring. Bilateral moderate neural foraminal narrowing. Posterior osteophytes causing mild to moderate spinal canal narrowing. C6-7:  No spinal canal or neural foraminal stenosis. C7-T1:  No spinal canal or neural foraminal stenosis.  No abnormality of the paraspinous soft tissues.     Impression: 1. No acute fracture or traumatic subluxation. 2. Degenerative changes as described above. I have personally reviewed the examination and initial interpretation and I agree with the findings. SUZI HOLDEN MD   SYSTEM ID:  [id number]    CT Chest Pulmonary Embolism w Contrast    Result Date: 10/22/2021  EXAMINATION: CT ABDOMEN PELVIS W CONTRAST, CT CHEST PULMONARY EMBOLISM W CONTRAST, 10/22/2021 1:07 PM TECHNIQUE:  Helical CT images from the lung apices through the symphysis pubis were obtained with IV contrast. CTPA of the chest, contrast bolus also used for concurrently acquired CT abdomen pelvis. COMPARISON: Same day radiograph. CT chest same day, and 9/30/2021. HISTORY: Evaluate for fluid in pelvis  or pathology in abdomen. Bilateral lung transplant performed 10/16/2021. FINDINGS: Support devices: Endotracheal tube terminates in the low thoracic trachea. Gastric tube tip is in the stomach. Feeding tube tip is in the proximal jejunum. Right basilar chest tube. Right apical chest tube. Left basilar chest tube. A left apical chest tube. Mediastinal drains. Right arm PICC terminates in the SVC. Right IJ approach [place]-Sintia catheter tip is in the main pulmonary artery. Chest: No pulmonary embolus. Surgical changes of bilateral lung transplantation. No adenopathy. Esophagus is unremarkable. Trachea and central airways are clear of debris. Trace fluid in the air in the pleural spaces bilaterally, with chest tubes in place as described. Patchy opacities in the lung bases with more confluence consolidative appearance containing air bronchograms in the right lower lobe, and left upper lobe along the interlobar fissure similar to prior exam. Abdomen and pelvis: Subcentimeter hypodensity in the central right hepatic lobe similar to prior exams. Gallbladder, bile ducts, pancreas, spleen adrenal glands, kidneys all are unremarkable. Urinary bladder is decompressed around a Watson catheter bulb, with small amount of contrast remaining within the bladder. Enteric contrast throughout the colon. Colonic diverticulosis without acute inflammation. Appendix is normal. No free fluid or free air. No abdominal pelvic lymphadenopathy. Major vessels are normal. Bones and soft tissues: No acute or suspicious osseous lesion. Clamshell sternotomy wires. Expected air in the soft tissues overlying the upper chest and neck similar to prior.     IMPRESSION: 1. No acute pulmonary embolus. 2. Surgical changes of bilateral lung transplantation are stable compared to exam performed earlier today. Similar appearance of opacities throughout both lungs, likely representing atelectasis and/or consolidation. 3. No acute abnormality in the abdomen or  pelvis.  I have personally reviewed the examination and initial interpretation and I agree with the findings. FITO PERALES MD   SYSTEM ID:  [id number]    CT Chest w/o Contrast    Result Date: 10/23/2021  EXAMINATION: CT CHEST W/O CONTRAST, 10/22/2021 8:37 AM CLINICAL HISTORY: Interstitial lung disease. Status post lung transplantation on 10/16/2021; poor lung function COMPARISON: Radiographs same day, 10/21/2021. CT 9/30/2021.. TECHNIQUE: CT imaging obtained through the chest without contrast. Coronal, sagittal and axial MIP reformatted images obtained and reviewed. CONTRAST: None FINDINGS: Lines, tubes, devices: Endotracheal tube tip terminating in the mid thoracic trachea. Feeding tube and gastric tube coursing into the stomach with tips of the field of view. Right internal jugular [place]-Sintia catheter with tip terminating in the proximal right pulmonary artery. Mediastinal drain in stable position. Right upper extremity PICC with tip terminating at the superior cavoatrial junction. Bilateral apically oriented chest tubes in stable position. Clamshell sternotomy wires are intact. Cutaneous staple line. Lungs: Postoperative changes of bilateral lung transplantation. Small right and trace left pleural effusions. Scattered groundglass opacities. Mild smooth interlobular septal thickening. Consolidative density of the left upper lobe along the major fissure with air bronchograms. Multifocal consolidative opacities of the left lower lobe. Mucous plugging of the right lower lobe with a small amount of collapse of the right lower lobe. The central tracheobronchial tree is patent. No areas of bronchiectasis or architectural distortion. No pneumothorax. Mild bibasilar atelectasis. Mediastinum: The visualized thyroid is unremarkable.Heart size is within normal limits. No pericardial effusion. Small focus of pneumomediastinum located in the anterior mediastinum. The thoracic aorta and main pulmonary artery are within normal  limits. Standard branching pattern of the great vessels. No abnormal thoracic lymph nodes. Bones and soft tissues: No suspicious osseous lesion. Mild degenerative changes of the thoracolumbar spine. Sclerotic focus on the posterior aspect of the right second rib, favoring bone island. Extensive subcutaneous emphysema over the anterior chest wall and right neck. Upper abdomen:  Limited evaluation of the upper abdomen. No acute pathology of the visualized solid organs. Small sliding-type hiatal hernia.     IMPRESSION: 1. Postoperative changes of bilateral lung transplantation with supportive devices in stable positioning. 2. Multifocal consolidative opacities involving the left upper, left lower, and right lower lobes with scattered mucous plugging. Concerning for aspiration or infection. 3. Multifocal diffuse groundglass opacities with mild interlobular septal thickening, compatible with pulmonary edema. 4. Extensive subcutaneous emphysema of the anterior chest wall and right neck. . I have personally reviewed the examination and initial interpretation and I agree with the findings. MAURICIO NAVAS MD   SYSTEM ID:  [id number]    CT Chest w/o Contrast    Result Date: 9/30/2021  CT chest without contrast INDICATION: Cough, interstitial lung disease, lung transplant workup, new cough and C-reactive protein elevation. Contrast: None COMPARISON: 9/7/2021 FINDINGS: The included thyroid appears unremarkable. Right upper extremity PICC line tip is just into the right atrium. No pleural or pericardial effusion. There are multiple nonenlarged mediastinal lymph nodes. Small hiatus hernia. No other upper abdominal discrete abnormalities. Degenerative changes in the thoracic spine. Diffuse patchy and groundglass opacities with some organizing pneumonia component bilaterally. Mild bronchiectasis in all lobes of both lungs. There is trace subpleural sparing with the diffuse reticular an organizing pneumonia pattern the lungs. This may  be related to rheumatoid arthritis-interstitial lung disease. Overall, despite the prominence of this pattern this is similar to slightly improved, especially in the lower lobes. Increased mosaicism on expiration consistent with mild air trapping. There is a relatively isolated 9 mm posterior left lower lobe groundglass opacity (series 4 image 222) which likely was present before, again but the surrounding aeration currently is improved. Previous atelectasis in the dependent position bilaterally has improved.     IMPRESSION: Overall slightly improved groundglass opacities and organizing pneumonia pattern but still quite prominent. Mild bronchiectasis. Minimal air trapping. Focal 9 mm left lower lobe groundglass opacity was likely present before but is more conspicuous due to the surrounding improved aeration currently. Small hiatal hernia. Multiple nonenlarged mediastinal lymph nodes similar in appearance. JODI MENDEZ MD   SYSTEM ID:  [id number]    CT Head w/o Contrast    Result Date: 10/22/2021  CT HEAD W/O CONTRAST 10/22/2021 8:32 AM History:  Neuro deficit, acute, stroke suspected  Comparison: None. Technique: Using multidetector thin collimation helical acquisition technique, axial, coronal and sagittal CT images from the skull base to the vertex were obtained without intravenous contrast. Findings: Focal hypodensity in the left posterior parieto-occipital region. Few additional regions of focal hyperdensity including involvement of the posterior right parietal lobe, left frontal centrum semiovale and, right frontal lobe, and the posterolateral left cerebellum. There is no intracranial hemorrhage, mass effect or midline shift. There is no focal loss of gray-white matter differentiation, insular ribbon sign, or focally hyperdense artery to suggest acute infarction. The ventricles are proportionate to the cerebral sulci. The bony calvaria and the bones of the skull base appear normal. The visualized portions  of the paranasal sinuses are clear. Minimal bilateral dependent mastoid effusions.     Impression: 1. No acute intracranial hemorrhage. No acute loss of gray-white regurgitation. 2. Prior infarcts in the bilateral cerebral hemispheres and left cerebellar hemisphere, likely late subacute. [Stroke Code Result: No acute intracranial hemorrhage.] Finding was identified on 10/22/2021 8:58 AM. Stroke team was contacted by Dr. Hinson on 10/22/2021 8:58 AM and verbalized understanding of the result. [facility] Stroke Communication Guidelines: [place] ED: [phone] ([facility] ED) [place] Inpatient: [phone] ext. [phone] (Stroke Ascom) [facility] [place] ED or Inpatient: [phone] ([facility] ED) I have personally reviewed the examination and initial interpretation and I agree with the findings. ROSS SANTIAGO MD   SYSTEM ID:  [id number]    CT Head Perfusion w Contrast    Result Date: 10/22/2021  CTA  HEAD NECK WITH CONTRAST, CT HEAD PERFUSION WITH CONTRAST 10/22/2021 8:35 AM CT angiogram of the neck CT angiogram of the base of the brain with contrast Reconstruction on the 3D workstation Provided History:  Stroke/TIA, assess intracranial arteries; Stroke/TIA, assess extracranial arteries; Unresponsiveness, CODE STROKE Comparison:  CT 10/22/2021.  Technique: HEAD and NECK CTA: During rapid bolus intravenous injection of nonionic contrast material, axial images were obtained using thin collimation multidetector helical technique from the base of the upper aortic arch through the [place] of [place]. This CT angiogram data was reconstructed at thin intervals with mild overlap. Images were sent to the 3D workstation, and 3D reconstructions were obtained. The axial source images, multiplanar reformations, 3D reconstructions in both maximum intensity projection display and volume rendered models were reviewed, with reconstructions performed by the technologist. CT Perfusion CT BRAIN PERFUSION: Dynamic perfusion CT of the brain was performed at multiple  levels; perfusion was measured and imaged during rapid bolus intravenous injection of nonionic iodinated contrast medium. Images were post-processed, reconstructed, and reviewed. Contrast: iopamidol (ISOVUE-370) solution 115 mL Findings: CTA: Head CTA demonstrates no aneurysm or stenosis of the major intracranial arteries. Neck CTA demonstrates no stenosis of the major cervical arteries. The origins of the great vessels from the aortic arch are patent. The normal distal right internal carotid artery measures 5 mm. The normal distal left internal carotid artery measures 5 mm. Postsurgical changes of the chest with subcutaneous emphysema extending along the anterior chest wall and into the neck. Partially visualized bilateral chest tubes. ET tube tip in the upper thoracic trachea. Partially visualized nasoenteric tube.. CT Perfusion: CT Perfusion: Focally decreased cerebral blood volume and cerebral blood flow in the posterior most left parieto-occipital region with corresponding slight increase in TTP possibly representing an area of hypoperfusion or core infarct. Rapid data processing fail due to no suitable area for location being identified.     Impression:  1. Head CTA demonstrates no aneurysm or stenosis of the major intracranial arteries. 2. Neck CTA demonstrates no stenosis of the major cervical arteries. 3. CT perfusion demonstrates focally asymmetric decrease in CBF and CBV with a corresponding increase in MTT at the posterior most left parieto-occipital region suggestive of core infarct, corresponding to same-day CT findings. Rapid data processing failed and so core infarct volume, hypoperfusion volume, and mismatch volume are not calculated. Findings were discussed by Dr. Terrell of radiology with the stroke team at approximately 9:10 AM on 10/22/2021. I have personally reviewed the examination and initial interpretation and I agree with the findings. SUZI HOLDEN MD   SYSTEM ID:  [id number]    EEG Video  12-26 hr Unmonitored    Result Date: 10/23/2021  EEG Video 12-26 hr Unmonitored Result VIDEO EEG DATE: 10/22/21 VIDEO EEG [id number] VIDEO EEG DAY#: 1 VIDEO EEG SOURCE FILE DURATION: 17 hours and 15 min PATIENT HISTORY: 56-year-old with multiple medical problems including status post on lung transplant.  He is intubated and encephalopathic in ICU.  He had been treated with multiple sedative drips but these were discontinued about 4 hours prior to the initiation of this study.  He continues encephalopathic and is being evaluated for seizures. TECHNICAL SUMMARY: This is a video-EEG monitoring study. EEG was recorded from 23 scalp electrodes placed according to the 10-20 international system by qualified technicians. Additional electrodes were utilized for referencing, artifact detection, and recording from other cerebral regions. Video was continuously recorded. Video was reviewed for clinical correlation and to assist with EEG interpretations. BACKGROUND ACTIVITY: No posterior dominant rhythm.  30 to 40  V of delta predominates.  No sleep patterns. ACTIVATION PROCEDURE: Stimulation is performed utilizing ICU protocol at 11:13AM.  There is no change in the background. OTHER INTERICTAL ABNORMALITIES: Occasional brief runs of triphasic activity at the rate of 1Hz.  These generally do not exceed 3 or 4 seconds.  Occasional individual generalized triphasic waves.  Persistence of triphasic waves estimated at less than 10%. ICTAL ABNORMALITIES: No electrographic seizures. IMPRESSION: Abnormal.  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  Epileptiform discharges were seizures not seen.  No evidence of nonconvulsive status epilepticus. Giovany Greenfield MD EPILEPSY STAFF

## 2021-10-24 NOTE — PROGRESS NOTES
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    OVERNIGHT EVENTS:  Wiggled toes spontaneously overnight     TODAY'S PROGRESS:   Resume bactrim  Increase diuresis and antihypertensives   PEEP to 5, PST BID  Diuresis goal net -500-1L today   Will need PICC line holiday when able   Titrate D5, TFs, and insulin gtt   NJ placement when able     PLAN:  Neuro/ pain/ sedation:  Acute postoperative pain  Anxiety and depression  Acute to subacute CVA, b/l cerebral hemispheres and L cerebellum  Encephalopathy and diffuse weakness  - Pain: Fentanyl gtt at 50   - Sedation: none, given encephalopathy   - PTA escitalopram on hold   - low-intensity heparin gtt today   - appreciate neurology     Pulmonary care:   SALTY on home CPAP  Acute hypoxic respiratory failure  S/p b/l lung transplant 10/16/21  - Ventilator Settings:CMV- 18/400/10/50, change peep to 5    - Levalbuterol nebs and Mucomyst  - Chest PT  - Daily CXR  - PST BID   - Titrate supplemental oxygen to maintain saturation above 92%.  - Pulmonary hygiene: Incentive spirometer every 15- 30 minutes when awake, flutter valve, C&DB  - 2mg bumex TID today, goal net -500-1L  - thoracic consult for trach       Cardiovascular:    HTN  Afib   TC 10/23 no vegetations  Cardiogenic shock - resolved, now hypertensive   - Monitor hemodynamic status.   - Esmolol, Nicardipine gtt. MAP <100, SBP <140  - Amlodipine 10 mg daily. Hydralazine to 75 mg oral q8, isordil to 40 tid  - Statin: rosuvastatin 10mg  - ASA: will d/w neurology    - Amiodarone 400 mg BID    GI care/ Nutrition:   Elevated LFTs- resolving  GI bleed - NG trauma per GI   - Diet: trickle  feeds via stomach until NJ placed   - PPI BID  - Continue bowel regimen: miralax, senna  - Trend LFTs    Renal/ Fluid Balance/ Electrolytes:   Acute Kidney Injury- resolving  BL creat appears to be ~ 0.7  - bumex 2 TID for goal net negative -500-1L    - Strict I/O, daily weights  - Avoid/limit nephrotoxins as able  - Replete lytes PRN per protocol     Endocrine:    Stress induced hyperglycemia with steroid-induced component, on DM2  Hypothyroidism  RA  Preop A1c 6.6  - Insulin gtt  - Goal BG <180 for optimal healing  - PTA meds: Solumedrol 125 mg q6, Synthroid 25 mcg  - Received 2 doses of Rituximab 6/21  - Rheumatology consult     ID/ Antibiotics:  Immunosuppression  Stress induced leukocytosis  Fungemia, source unclear  - Micafungin(10/22-)  - Nystatin, Acyclovir, resume bactrim   - Tacrolimus  - Methylpred followed by oral prednisone  - Continue to monitor fever curve, WBC and inflammatory markers as appropriate  - Follow up cultures  - appreciate transplant ID  - will need PICC line holiday    - Ophthalmology consulted     Heme:     Acute blood loss anemia  Hypogammaglobulinemia s/p IVIG  Theombocytopenia, HIT negative   No s/sx active bleeding  - CBC in AM  - ID as above     MSK/ Skin:  Sternotomy  Surgical Incision  - Sternal precautions  - Postoperative incision management per protocol  - PT/OT/CR     Prophylaxis:    - Mechanical prophylaxis for DVT: SCDs  - Chemical DVT prophylaxis: Heparin subcutaneous  - PPI for 30 days postoperatively     Lines/ tubes/ drains:  - Right PICC(pre-op)  - Right radial A line(10/16)  - ETT(10/16)  - Watson(10/16)  - Chest tubes- 4 pleural, 1 mediastinal(10/16)  - OG(10/16)   - NJ(10/18)     Disposition:  - CV ICU.      Patient seen, findings and plan discussed with CV ICU staff.     Altagracia Allan MD  Surgery Resident PGY-3  Pg [phone]    ====================================    SUBJECTIVE:   Still not following commands, wiggled toes overnight     OBJECTIVE:   1. VITAL SIGNS:   Temp:   [97.9  F (36.6  C)-98.6  F (37  C)] 98.4  F (36.9  C)  Pulse:  [64-89] 68  Resp:  [21-27] 24  MAP:  [34 mmHg-95 mmHg] 34 mmHg  Arterial Line BP: ()/(38-86) 135/62  FiO2 (%):  [50 %] 50 %  SpO2:  [93 %-100 %] 100 %  Ventilation Mode: CMV/AC  (Continuous Mandatory Ventilation/ Assist Control)  FiO2 (%): 50 %  Rate Set (breaths/minute): 18 breaths/min  Tidal Volume Set (mL): 400 mL  PEEP (cm H2O): 10 cmH2O  Oxygen Concentration (%): 50 %  Resp: 24      2. INTAKE/ OUTPUT:   I/O last 3 completed shifts:  In: 3093.73 [I.V.:2513.73; NG/GT:450]  Out: 2810 [Urine:2715; Chest Tube:95]    3. PHYSICAL EXAMINATION:   General: intubated  Neuro: BRAYDEN, opens eyes, not following commands, no motor observed   Resp: nonlabored on vent  CV: RRR on tele   Abdomen: nondistended   Incisions: c/d/i  Extremities: warm and well perfused, no edema  CTs serosanguinous     4. INVESTIGATIONS:   Arterial Blood Gases   Recent Labs   Lab 10/24/21  0410 10/24/21  0010 10/23/21  2016 10/23/21  1543   PH 7.47* 7.47* 7.49* 7.48*   PCO2 35 35 32* 40   PO2 176* 131* 152* 118*   HCO3 26 25 24 30*     Complete Blood Count   Recent Labs   Lab 10/24/21  0410 10/23/21  0344 10/22/21  2013 10/22/21  1407 10/22/21  0958 10/22/21  0958 10/22/21  0407 10/22/21  0407   WBC 21.4* 21.3*  --   --   --  20.7*  --  18.7*   HGB 9.8* 9.6* 9.0* 10.4*   < > 9.1*   < > 6.6*    132*  --   --   --  112*  --  93*    < > = values in this interval not displayed.     Basic Metabolic Panel  Recent Labs   Lab 10/24/21  0653 10/24/21  0607 10/24/21  0514 10/24/21  0409 10/24/21  0009 10/23/21  2249 10/23/21  1553 10/23/21  1543 10/23/21  0840 10/23/21  0751   NA  --   --   --  145*  --  146*  --  147*  --  145*   POTASSIUM  --   --   --  4.2  --  3.4  --  3.8  --  3.7   CHLORIDE  --   --   --  111*  --  109  --  111*  --  110*   CO2  --   --   --  30  --  30  --  30  --  30   BUN  --   --   --  61*  --  68*  --  63*  --  73*   CR  --   --   --  1.06  --  1.11  --  1.17   --  1.31*   * 138* 117* 123*  106*   < > 159*   < > 126*   < > 126*    < > = values in this interval not displayed.     Liver Function Tests  Recent Labs   Lab 10/24/21  0409 10/23/21  2249 10/23/21  1543 10/23/21  0751 10/23/21  0344 10/23/21  0344 10/22/21  1601 10/22/21  1407   AST 20  22 25   < > 27   < >  --    * 164* 183* 210*   < > 223*   < >  --    ALKPHOS 160* 159* 166* 176*   < > 182*   < >  --    BILITOTAL 0.2 0.2 0.2 0.3   < > 0.3   < >  --    ALBUMIN 1.4* 1.3* 1.4* 1.3*   < > 1.3*   < >  --    INR 1.25*  --  1.27*  --   --  1.41*  --  1.28*    < > = values in this interval not displayed.     Pancreatic Enzymes  No lab results found in last 7 days.  Coagulation Profile  Recent Labs   Lab 10/24/21  0409 10/23/21  1543 10/23/21  0344 10/22/21  1407   INR 1.25* 1.27* 1.41* 1.28*   PTT  --   --   --  43*         5. RADIOLOGY:   Recent Results (from the past 24 hour(s))   Echo Limited    Narrative    [id number]  [id number]  [id number]  [id number]^BLU^MECHELLE     [facility],[place]  Echocardiography Laboratory  [address] [place] [id number]     Name: SHAYNA GUERRIER  MRN: [id number]  : 1965  Study Date: 10/23/2021 08:38 AM  Age: 56 yrs  Gender: Male  Patient Location: [facility]  Reason For Study: Endocarditis  Ordering Physician: MECHELLE HURT  Referring Physician: SAUNDRA GILL  Performed By: Ayaka Tong RDCS     BSA: 1.8 m2  Height: 64 in  Weight: 164 lb  HR: 68  BP: 97/60 mmHg  ______________________________________________________________________________  Procedure  Limited Portable Echo Adult.  ______________________________________________________________________________  Interpretation Summary  Limited echo for endocarditis     Valves cannot be assessed in this echo due to bandages on the chest.     Would consider a transesophageal echocardiogram if clinically  indicated.  ______________________________________________________________________________  Right Ventricle  Global right ventricular function is normal.  ______________________________________________________________________________  Report approved by: Micaela ARTEAGA 10/23/2021 10:06 AM     ______________________________________________________________________________      XR Abdomen Port 1 View    Narrative    EXAM: XR ABDOMEN PORT 1 VIEWS  10/23/2021 10:35 AM      HISTORY: eval NJ    COMPARISON: 10/22/2021    FINDINGS: Single supine AP radiograph of the abdomen. Feeding tube tip  projects over the stomach. Nonobstructive bowel gas pattern. Scattered  hypodensities project over the abdomen, likely residual intraluminal  contrast is seen on CT 10/22/2021. Patchy opacities in the lung bases.  Partial visualization of chest tubes.      Impression    IMPRESSION: Feeding tube tip projects over the upper stomach.  Recommend advancement.    FREDDY LEAHY MD         SYSTEM ID:  [id number]   XR Abdomen 1 View    Narrative    Exam: XR ABDOMEN 1 VIEW, 10/23/2021 3:43 PM    Indication: OG tube placement    Comparison: X-ray abdomen 10/23/2021    Findings:   A single portable supine view of the abdomen is obtained. Multiple  tubes project over the abdomen and courses toward the chest. Gastric  tube tip and sidehole projected over the stomach. Nonobstructive bowel  gas pattern. Urethral temperature probe in place. Retained oral  contrast within the appendix.      Impression    Impression: Gastric tube tip and sidehole projected over the stomach.  Nonobstructive bowel gas pattern.    I have personally reviewed the examination and initial interpretation  and I agree with the findings.    FITO PERALES MD         SYSTEM ID:  [id number]   MR Brain w/o & w Contrast    Narrative     MR BRAIN W/O & W CONTRAST 10/23/2021 7:33 PM    Provided History: Mental status change, unknown cause.  ICD-10: Altered mental status    Comparison:  CTA head 10/22/2021.    Technique: Multiplanar T1-weighted, axial FLAIR, and susceptibility  images were obtained without intravenous contrast. Following  intravenous gadolinium-based contrast administration, axial  T2-weighted, diffusion, and T1-weighted images (in multiple planes)  were obtained.    Contrast: 6.8 mL Gadavist    Findings:  Diffusion-weighted imaging demonstrates multiple areas of diffusion  restriction within the left occipital lobe, right occipital lobe,  bilateral frontal and parietal lobes, and left cerebellum, all of  which correlate to areas of hypoattenuation seen on prior CT  10/22/2021. These demonstrate high T2 signal on FLAIR imaging and low  ADC signal. Corresponding effacement of the cerebral sulci. Small  areas of intrinsic T1 hyperintensity within the larger areas of  infarct. Punctate regions of susceptibility effect in both cerebral  hemispheres, greatest in the region of left occipital infarct.  Postcontrast T1-weighted spin-echo images demonstrate patchy regions  of enhancement corresponding to areas of restricted diffusion.    Scattered periventricular T2 hyperintensities without associated  diffusion restriction likely the sequelae of chronic small vessel  ischemic disease. The ventricles are proportionate to the cerebral  sulci. Normal major vascular intracranial flow-voids.    Normal calvarial marrow signal. Mild paranasal sinus mucosal  thickening. Extensive bilateral mastoid effusions. The orbits are  grossly unremarkable.      Impression    Impression:  Multifocal subacute infarcts within both cerebral hemispheres and left  cerebellum all of which appear present on prior head CT 10/22/2021.  Minimal petechial hemorrhage associated with the infarct in the left  occipital lobe.    I have personally reviewed the examination and initial interpretation  and I agree with the findings.    ROSS SANTIAGO MD         SYSTEM ID:  [id number]        =========================================

## 2021-10-24 NOTE — PROGRESS NOTES
Pulmonary Medicine  Cystic Fibrosis - Lung Transplant Team  Progress Note  10/24/2021       Patient: Edson Thornton  MRN: [id number]  : 1965 (age 56 year old)  Transplant: 10/16/2021 (Lung), POD#8  Admission date: 2021    Assessment & Plan:     Edson Thornton is a 56 year old male with a PMH significant for NSIP/ILD, bronchiectasis, moderate PH, RA, SALTY, chronic HSV infection, hypogammaglobulinemia, steroid-induced diabetes, hypothyroidism, HTN, HLD, duodenal anomaly, anxiety, and depression.  Admitted on 21 from OSH for acute on chronic respiratory failure 2/2 ILD exacerbation.  Pt. is now s/p BSLT on 10/16/21.  Surgery relatively uncomplicated but pt. with low cardiac index and PGD immediately post-op.  The patient remains intubated on Caroline in the CVICU, difficulty with weaning sedation.  Afib with RVR noted 10/18 and also with BRENNAN, improving with aggressive diuresis.     Today's recommendations:   - Agree with diuresis with bumex 2mg TID.  - CXR daily as below.  - Tacrolimus level supratherapeutic on 10/23/21, level pending today, continue daily levels (ordered)  - Prednisone taper due 10/24 (ordered).  - Restart bactrim.  - CMV and EBV  (not yet ordered).  - Follow pending cultures (10/20), continue ceftazidime, defer vancomycin and antifungal coverage pending BDG fungitell/cultures.  - Coccidioides Ag next due  (not yet ordered).  - IVIG with premedications today (ordered).  - DSA ordered 10/24.  - Donor risk labs ordered 11/15.  - Replace NJ in place.      S/p BSLT for ILD:  Acute hypoxic/hypercapneic respiratory failure: Unfortunately had not received vaccination for flu, PNA, or COVID-19 PTA (not available after admission d/t immunosuppression state).  Remains on epinephrine for low CI.  Remains intubated on CMV 22/400/10/50 with Caroline at 4, cisatracurium weaned off 10/19, difficulty weaning sedation due to hypertension and vent dyssynchrony.  POD #1 CXR with new RLL focal  opacity and marked increase in diffuse interstitial opacities concerning for pulmonary edema.  Started on Bumex drip and ABX resumed as below 10/19 given intermittent fevers.  Bronch 10/20 with tan secretions to LLL, repeat on 10/23/2021 with frothy clear secretions in Rt. TB tree.   - Nebs: levalbuterol and Mucomyst QID  - Aggressive pulmonary toilet with chest physiotherapy QID (addition of Aerobika and incentive spirometry once extubated)  - Ventilator per ICU team, agree with decreasing PEEP to 5.   - Anesthesia to place erector spinae catheters prior to anticipated extubation per our routine, deferred today  - Chest tubes managed by surgical team.  - Agree with ongoing diuresis as tolerated.  - CXR today shows ETT in posn. Has bilateral interstitial and airspace opacities.  - Defer chest CT non-contrast for now pending clinical course.  - S/p NJ feeding tube placement by radiology 10/18, TF management per RD and ICU team  - Recommend SLP consult as pt. nears extubation for swallowing evaluation.  - Explant pathology shows NSIP and No malignancy.     Immunosuppression:  - Induction therapy with basiliximab (and high dose IV steroid) given intraoperatively with repeat basiliximab POD#4  - Tacrolimus level is 22.7 (10/23/2021). Goal level 8-12. HOld tacrolimus. We will repeat level daily for now (ordered).  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  - Nystatin for oral candidiasis ppx, 6 month course  - See below for serologies and viral ppx:    Donor Recipient  Intervention   CMV status Negative Negative None   EBV status Positive Positive N/A, EBV monthly (11/16, not yet ordered)   HSV status N/A Positive Acyclovir POD #1-30   (recent infection history)      Primary graft dysfunction (per ISHLT guidelines):  See chart below:    POD #0  (~0 hours) POD #1  (~24 hours) POD #2   (~48 hours) POD#3   (~72 hours)   Date 10/16 10/17 10/18 10/19   Time 1536 1537  1629 1559   Intubated Y Y Y Y   PaO2 227 108  116  84   FiO2 100 100  60  50   P/F Ratio 227 108  193  168   PGD Grade   (0=mild, 3=severe) 2 3  3  3   ECMO N N N N   Inhaled NO/Flolan Y Y Y Y   ISHLT PGD Scoring  Grade 0 - PaO2/FiO2 >300 and normal chest radiograph (absence of diffuse allograft infiltrates)  Grade 1 - PaO2/FiO2 >300 and diffuse allograft infiltrates on chest radiograph  Grade 2 - PaO2/FiO2 between 200 and 300  Grade 3 - PaO2/FiO2 <200 and/or on ECMO     ID: Concern for possible Strongyloides exposure pre-transplant s/p ivermectin x1 dose (9/17).  Fevers post-op, Tmax 101.7 POD #1.  S/p IV ceftaz/vancomycin for 48h per protocol.  Febrile again overnight 10/19 to 10/20.  - Donor cultures NGTD (UNOS data personally reviewed this morning)  - Recipient cultures NGTD (including fungal and AFB)   - Blood cultures (10/7, 10/17, 10/22 and 10/23) NGTD  - Bronch cultures (10/17) with Staph epidermidis and Candida albicans, repeat (10/20) with GPC and yeast. Cytology shows Yeast too.  - Sputum culture (10/17) with normal jean marie, repeat (10/20) with GPC and Candida  - Monthly Coccidioides Ag x12 months post-transplant per ID (urine and serum negative 10/17), due 11/17 (not yet ordered)  - ABX: ceftazidime (resumed 10/19 given fevers), defer vancomycin and antifungal coverage pending BDG fungitell/cultures     Candidemia: 1 of 2 blood cultures positive from 10/20.   Fungitell positive on 10/20 at 399.  Bronch cultures and sputum cultures positive for Candida.  TC (10/23/21) was negative for vegetations.   Started on  IV Micafungin on 10/23/21. Ophthalmology consult pending.   10/24/2021:  Will do line holiday hopefully tomorrow.       HSV: Chronic intermittent active infection pre-transplant with recent HSV infection: crusted lesions throughout left side of jaw, s/p 10 day treatment course of ACV through 10/9.  HSV PCR blood negative 10/17.  - ACV ppx as above (starting on POD #1 instead of POD #8 given HSV history and location)     Hypogammaglobulinemia: IgG previously low at 364 (9/7).  Noted at 265 at time of transplant (10/15).  - IVIG with premedication 10/21 (completed)     Positive cross match: Note that he received two doses of rituximab in June, which is likely contributing to cross match result.  DSA negative 10/17.  - Repeat DSA weekly x2 then q2 weeks (due 10/24, ordered)     PHS risk criteria donor:  Additional labs required post-transplant (between 4-8 weeks post-op): Hepatitis B, Hepatitis C, and HIV by VISHAL ([id number], ordered POD #30).     SALTY: Noted pre-transplant. Home CPAP 6-12 cmH2)  - Resume BiPAP at night post-extubation     Other relevant problems being managed by primary team:    New CVA: Pt has been unable to arouse but did move extremities on 10/21/21 in AM. Head CTA shows new multiple CVA, neurology input appr. EEG is neg for seizures (10/22/2021).  - MRI head (10/23/21):   - Etiology:? Surgery vs. embolic vs. infectious.  10/24/2021: Agree,On low intensity heparin gtts.    HTN: On Nicardipine/esmolol gtts since 10/23/21. Was on Nitroprusside the day prior.  10/24/2021: Adding Coreg. Increased Isordil/hydralazine/    GI bleed: Hgb dropped by ~2g (10/22/2021). GI bleed noted by OG.  - GI Consulted, EGD (10/23/2021) shows only NG trauma. PPI IV BID recommended.     Afib with RVR: Noted 10/18, started on amiodarone drip.  Currently in sinus rhythm and amio at 400mg BID.  - Management per ICU team     Elevated LFTs: Possible shock liver post-op.  Initial ALT//230 evening of surgery, then with marked  "increase to 1550/1246 POD #1.  Gradual improvement.  - Daily LFTs     BRENNAN: Baseline creatinine 0.7, increased to 1.2 POD #1 and further increased to 2.05 POD #2.  S/p 3.5 L volume resuscitation the first 12 hours post-op for rising lactic acid (peak 9).  UO also downtrending.  Improving with aggressive diuresis.  10/24/2021: REsolved. Back on bactrim and bumex 2mg TID.    We appreciate the excellent care provided by the CVICU and CVTS teams.  Recommendations communicated via in person rounding and this note.  Will continue to follow along closely, please do not hesitate to call with any questions or concerns.     Subjective & Interval History:     Pt is opening eyes but not following any commands. No more GI bleed.     Review of Systems:     ROS as above otherwise limited due to intubation and altered mental status.    Physical Exam:     Vital signs:  Temp: 98.6  F (37  C) Temp src: Bladder   Pulse: 85   Resp: 29 SpO2: 99 % O2 Device: Mechanical Ventilator Oxygen Delivery: 15 LPM Height: 162.6 cm (5' 4\") Weight: 72.2 kg (159 lb 2.8 oz)  I/O:       Intake/Output Summary (Last 24 hours) at 10/24/2021 1422  Last data filed at 10/24/2021 1200  Gross per 24 hour   Intake 2615.81 ml   Output 2785 ml   Net -169.19 ml       Constitutional: intubated, in no apparent distress.   HEENT: Eyes with pink conjunctivae, anicteric.  Orally intubated via ETT.  Nasal FT.  PULM: Mildly diminished air flow to bases bilaterally.  Coarse t/o.  Non-labored breathing on full vent support.  CV: Normal S1 and S2.  RRR.  No murmur, gallop, or rub.  No peripheral edema.   ABD: Hypoactive BS, soft, nondistended.    MSK: No apparent muscle wasting.   NEURO: Not following commands  SKIN: Warm, dry.  No rash on limited exam.  Clamshell incision covered with wound vac.  PSYCH: Calm.     Lines, Drains, and Devices:  PICC Triple Lumen Right  (Active)   Site Assessment WDL 10/21/21 0400   External Cath Length (cm) 0 cm 10/11/21 1140   Dressing " Intervention Chlorhexidine patch;Transparent 10/21/21 0400   Dressing Change Due 10/24/21 10/21/21 0400   PICC Comment CDI 10/21/21 0400   Mosley - Status infusing 10/21/21 0400   Gray - Cap Change Due 10/22/21 10/21/21 0400   Red - Status infusing 10/21/21 0400   Red - Cap Change Due 10/22/21 10/21/21 0400   White - Status infusing 10/21/21 0400   White - Cap Change Due 10/22/21 10/21/21 0400   Extravasation? No 10/21/21 0400   Line Necessity Yes, meets criteria 10/21/21 0400   Number of days:        Arterial Line 10/16/21 (Active)   Site Assessment WDL 10/21/21 0400   Line Status Pulsatile blood flow 10/21/21 0400   Arterine Line Cap Change Due 10/22/21 10/21/21 0400   Art Line Waveform Appropriate 10/21/21 0400   Art Line Interventions Leveled;Connections checked and tightened;Flushed per protocol 10/21/21 0400   Color/Movement/Sensation Capillary refill less than 3 sec 10/21/21 0400   Line Necessity Yes, meets criteria 10/21/21 0400   Dressing Type Transparent 10/21/21 0400   Dressing Status Clean, dry, intact 10/21/21 0400   Dressing Intervention Dressing changed/new dressing 10/17/21 0400   Dressing Change Due 10/24/21 10/21/21 0400   Number of days: 5       CVC Double Lumen Right Internal jugular (Active)   Site Assessment WDL 10/21/21 0400   Dressing Type Chlorhexidine sponge;Transparent 10/21/21 0400   Dressing Status clean;dry;intact 10/21/21 0400   Dressing Intervention new dressing;dressing changed 10/17/21 0000   Dressing Change Due 10/24/21 10/21/21 0400   Line Necessity yes, meets criteria 10/21/21 0400   Brown - Status saline locked 10/21/21 0400   Brown - Cap Change Due 10/22/21 10/21/21 0400   White - Status infusing 10/21/21 0400   White - Cap Change Due 10/22/21 10/21/21 0400   Phlebitis Scale 0-->no symptoms 10/21/21 0400   Infiltration? no 10/21/21 0400   Infiltration Scale 0 10/20/21 1600   Infiltration Site Treatment Method  None 10/20/21 1600   CVC Comment MAC 10/21/21 0400   Number of days: 5        Pulmonary Artery Catheter - Triple Lumen 10/16/21 1500 Right internal jugular vein (Active)   Dressing dressing dry and intact 10/21/21 0400   Securement secured with sutures 10/21/21 0400   PA Catheter Markings (cm) 47 10/21/21 0400   Phlebitis 0-->no symptoms 10/21/21 0400   Infiltration 0-->no symptoms 10/21/21 0400   Waveform normal 10/21/21 0400   Lumen A - Color WHITE 10/21/21 0400   Lumen A - Status transduced 10/21/21 0400   Lumen A - Cap Change Due 10/22/21 10/21/21 0400   Lumen B - Color YELLOW 10/21/21 0400   Lumen B - Status transduced 10/21/21 0400   Lumen C - Color BLUE 10/21/21 0400   Lumen C - Status infusing 10/21/21 0400   Lumen C - Cap Change Due 10/22/21 10/21/21 0400   Number of days: 5     Data:     LABS    CMP:   Recent Labs   Lab 10/24/21  1227 10/24/21  1008 10/24/21  1004 10/24/21  0901 10/24/21  0514 10/24/21  0409 10/24/21  0009 10/23/21  2249 10/23/21  1553 10/23/21  1543 10/23/21  0422 10/23/21  0344 10/22/21  1710 10/22/21  1601   NA  --   --  146*  --   --  145*  --  146*  --  147*   < > 146*   < > 147*   POTASSIUM  --   --  4.2  --   --  4.2  --  3.4  --  3.8   < > 3.7   < > 3.6   CHLORIDE  --   --  112*  --   --  111*  --  109  --  111*   < > 109   < > 109   CO2  --   --  26  --   --  30  --  30  --  30   < > 32   < > 31   ANIONGAP  --   --  8  --   --  4  --  7  --  6   < > 5   < > 7   * 174* 202* 165*   < > 123*  106*   < > 159*   < > 126*   < > 132*   < > 148*   BUN  --   --  60*  --   --  61*  --  68*  --  63*   < > 67*   < > 68*   CR  --   --  1.00  --   --  1.06  --  1.11  --  1.17   < > 1.43*   < > 1.47*   GFRESTIMATED  --   --  84  --   --  78  --  74  --  69   < > 54*   < > 53*   ERIK  --   --  8.8  --   --  8.6  --  8.3*  --  8.5   < > 8.5   < > 8.2*   MAG  --   --   --   --   --  1.9  --   --   --  2.0  --  2.4*  --  2.1   PHOS  --   --   --   --   --  3.3  --   --   --  4.1  --  4.0  --  2.8   PROTTOTAL  --   --  5.9*  --   --  5.6*  --  5.3*  --  5.7*   < >  5.8*   < > 5.5*   ALBUMIN  --   --  1.5*  --   --  1.4*  --  1.3*  --  1.4*   < > 1.3*   < > 1.2*   BILITOTAL  --   --  0.4  --   --  0.2  --  0.2  --  0.2   < > 0.3   < > 0.3   ALKPHOS  --   --  170*  --   --  160*  --  159*  --  166*   < > 182*   < > 178*   AST  --   --  23  --   --  20  --  21  --  22   < > 27   < > 32   ALT  --   --  145*  --   --  153*  --  164*  --  183*   < > 223*   < > 281*    < > = values in this interval not displayed.     CBC:   Recent Labs   Lab 10/24/21  0410 10/23/21  0344 10/22/21  2013 10/22/21  1407 10/22/21  0958 10/22/21  0958 10/22/21  0407 10/22/21  0407   WBC 21.4* 21.3*  --   --   --  20.7*  --  18.7*   RBC 3.24* 3.21*  --   --   --  2.96*  --  2.23*   HGB 9.8* 9.6* 9.0* 10.4*   < > 9.1*   < > 6.6*   HCT 30.5* 30.2*  --   --   --  28.0*  --  21.7*   MCV 94 94  --   --   --  95  --  97   MCH 30.2 29.9  --   --   --  30.7  --  29.6   MCHC 32.1 31.8  --   --   --  32.5  --  30.4*   RDW 16.4* 17.2*  --   --   --  16.1*  --  17.0*    132*  --   --   --  112*  --  93*    < > = values in this interval not displayed.       INR:   Recent Labs   Lab 10/24/21  0409 10/23/21  1543 10/23/21  0344 10/22/21  1407   INR 1.25* 1.27* 1.41* 1.28*       Glucose:   Recent Labs   Lab 10/24/21  1227 10/24/21  1008 10/24/21  1004 10/24/21  0901 10/24/21  0813 10/24/21  0653   * 174* 202* 165* 113* 137*       Blood Gas:   Recent Labs   Lab 10/24/21  1222 10/24/21  0813 10/24/21  0410 10/24/21  0010 10/23/21  2108 10/23/21  2016 10/23/21  1543 10/23/21  1215 10/23/21  1215   PHV  --   --   --   --  7.42  --  7.44*  --  7.33   PCO2V  --   --   --   --  45  --  46  --  54*   PO2V  --   --   --   --  36  --  36  --  44   HCO3V  --   --   --   --  29*  --  31*  --  28   LORI  --   --   --   --  4.1*  --  6.5*  --  1.9   O2PER 50 50 50   < > 50   < > 50  50   < > 50    < > = values in this interval not displayed.       Culture Data No results for input(s): CULT in the last 168  hours.    Virology Data:   Lab Results   Component Value Date    FLUAH1 Negative 10/17/2021    FLUAH3 Negative 10/17/2021    NA8322 Negative 10/17/2021    IFLUB Negative 10/17/2021    RSVA Negative 10/17/2021    RSVB Negative 10/17/2021    PIV1 Negative 10/17/2021    PIV2 Negative 10/17/2021    PIV3 Negative 10/17/2021    HMPV Negative 10/17/2021    HRVS Negative 10/17/2021    ADVBE Negative 10/17/2021    ADVC Negative 10/17/2021       Historical CMV results (last 3 of prior testing):  No results found for: CMVQNT  No results found for: CMVLOG    Urine Studies    Recent Labs   Lab Test 10/22/21  1641 10/17/21  1642   URINEPH 7.5* 5.0   NITRITE Negative Negative   LEUKEST Negative Negative   WBCU 3 25*       Most Recent [place] Pulmonary Function Testing (FVC/FEV1 only)  No results found for: 20002  No results found for: 20003  No results found for: 20015  No results found for: 20016    IMAGING    Recent Results (from the past 48 hour(s))   US Upper Extremity Venous Duplex Right Portable    Narrative    EXAMINATION: DOPPLER VENOUS ULTRASOUND OF THE RIGHT UPPER EXTREMITY,  10/19/2021 9:53 AM     COMPARISON: None.    HISTORY: Right upper extremity swelling    TECHNIQUE:  Gray-scale evaluation with compression, spectral flow and  color Doppler assessment of the deep venous system of the right upper  extremity.    FINDINGS:  Normal blood flow and waveforms are demonstrated in the internal  jugular vein and there is no evidence of echogenic thrombus within the  lumen. There is normal compressibility of the brachial, basilic and  cephalic veins.  The right innominate, subclavian, and axillary veins are obscured due  to subcutaneous emphysema.    Right internal jugular central catheter in place without evidence of  echogenic thrombus in the lumen.  Right basilic venous approach PICC line starting in the antecubital  fossa.      Impression    IMPRESSION:  1.  No sonographic evidence of left upper extremity deep  venous  thrombosis.    I have personally reviewed the examination and initial interpretation  and I agree with the findings.    JODI MENDEZ MD         SYSTEM ID:  [id number]   XR Chest Port 1 View    Narrative    Chest one view portable    HISTORY: Chest tube output increased    COMPARISON STUDY: Same day at 00 52    FINDINGS: Changes of bilateral lung transplant. Endotracheal tube,  bilateral chest tubes, clamshell sternotomy, feeding tube, NG tube,  right IJ [place] catheter unchanged. Right PICC in the SVC with tip  not well visualized from overlying catheters. Mediastinal drain in  place. Cardiac silhouette is enlarged. Interstitial opacities  bilaterally unchanged. Subcutaneous emphysema bilaterally unchanged.  Bibasilar atelectasis.      Impression    IMPRESSION: Interstitial edema and bibasilar atelectasis.    DAVIN ANGUIANO MD         SYSTEM ID:  [id number]   XR Chest Port 1 View    Narrative    EXAMINATION: XR CHEST PORT 1 VIEW, 10/20/2021 4:03 AM    INDICATION: s/p lung transplant    COMPARISON: 10/19/2021    FINDINGS: Single portable 30 degree upright AP radiograph of the  chest. ET tube projects at the mid thoracic trachea. Feeding tube  courses below the left hemidiaphragm, tip is not within view. NG/OG  side-port projects over the stomach. Right IJ [place] catheter with  tip terminating over the proximal right main pulmonary artery. Right  upper extremity PICC line, tip is obscured by [place] catheter.  Bilateral chest tubes and mediastinal drain.    Trachea is midline. The cardiomediastinal silhouette is indistinct.  Small bilateral pleural effusions. No appreciable pneumothorax.  Unchanged interstitial and airspace opacities.    Partially visualized upper abdomen is unremarkable. Subcutaneous  emphysema along the left chest wall and right base of the neck.      Impression    IMPRESSION:  1. Postsurgical changes of bilateral lung transplant.  2. Bilateral pleural effusions with overlying  atelectasis. Unchanged  bilateral interstitial and airspace opacities.  3. No appreciable pneumothorax.    I have personally reviewed the examination and initial interpretation  and I agree with the findings.    ZHANNA DONG MD         SYSTEM ID:  [id number]   XR Abdomen Port 1 View    Narrative    EXAM: XR ABDOMEN PORT 1 VIEWS  10/20/2021 11:41 AM      HISTORY: Increased OG output    COMPARISON: Abdominal radiograph 10/16/2021    FINDINGS: Portable abdominal radiograph. Gastric tube terminating over  the stomach. Enteric tube terminating over the gastric pylorus.  Nonobstructive bowel gas pattern. Multiple small radiodensities  projecting over the right colon, presumably intraluminal. No  pneumatosis. No portal venous gas. Watson catheter projecting over the  pelvis. Partially visualized mediastinal drain and right basilar chest  tube. Bibasilar opacities.      Impression    IMPRESSION:   1. Nonobstructive bowel gas pattern.  2. Feeding tube is coiled within the stomach.  3. Gastric tube tip and sidehole project over the stomach.    I have personally reviewed the examination and initial interpretation  and I agree with the findings.    FREDDY LEAHY MD         SYSTEM ID:  [id number]   XR Feeding Tube Placement    Narrative    Feeding tube placement: 10/20/2021 4:15 PM    Comparison: None    Indication: Advancement of feeding tube    Fluoroscopy time: 1.42 minutes    Technique: After injection of Xylocaine gel into the left nostril, a  feeding tube was advanced under fluoroscopic guidance.    Findings: The feeding tube was advanced with the tip in the third  portion of the duodenum. A small amount of barium was injected to  demonstrate placement within the small bowel. The feeding tube was  then flushed with normal saline. The feeding tube was secured using a  nasal bridle.      Impression    Impression: Uncomplicated feeding tube advancement with tip in the  third portion of the duodenum.    IFITO,  MD, attest that I was immediately available to  provide guidance and assistance during the entirety of the procedure.  The examination was performed portable in the ICU therefore a  radiologist was not directly present during tube placement.    I have personally reviewed the examination and initial interpretation  and I agree with the findings.    FITO PERALES MD         SYSTEM ID:  [id number]   US Upper Extremity Venous Duplex Left    Narrative    EXAMINATION: DOPPLER VENOUS ULTRASOUND OF THE LEFT UPPER EXTREMITY,  10/20/2021 4:29 PM     COMPARISON: None.    HISTORY: s/p surgery- assess for DVT. Post op fever      TECHNIQUE:  Gray-scale evaluation with compression, spectral flow and  color Doppler assessment of the deep venous system of the left upper  extremity.    FINDINGS:  Left: Normal blood flow and waveforms are demonstrated in the internal  jugular, innominate, subclavian, and axillary veins. There is normal  compressibility of the paired brachial, basilic and cephalic veins.      Impression    IMPRESSION:  No evidence of left upper extremity deep venous thrombosis.    I have personally reviewed the examination and initial interpretation  and I agree with the findings.    FREDDY LEAHY MD         SYSTEM ID:  [id number]   US Lower Extremity Venous Duplex Bilateral    Narrative    EXAMINATION: DOPPLER VENOUS ULTRASOUND OF BILATERAL LOWER EXTREMITIES,  10/20/2021 4:29 PM     COMPARISON: None.    HISTORY: To assess for DVT status post surgery. Postop fever.    TECHNIQUE:  Gray-scale evaluation with compression, spectral flow and  color Doppler assessment of the deep venous system of both legs from  groin to knee, and then at the ankles.    FINDINGS:  In both lower extremities, the common femoral, femoral, popliteal and  posterior tibial veins demonstrate normal compressibility and blood  flow.      Impression    IMPRESSION:  No evidence of deep venous thrombosis in either lower extremity.    I have personally  reviewed the examination and initial interpretation  and I agree with the findings.    FREDDY LEAHY MD         SYSTEM ID:  [id number]   XR Chest Port 1 View    Narrative    EXAMINATION: XR CHEST PORT 1 VIEW, 10/21/2021 1:56 AM    INDICATION: s/p lung transplant    COMPARISON: 10/20/2021    FINDINGS: ET tube projects 1.5 cm above the shiv. Stable bilateral  chest tubes and mediastinal drain. Right IJ [place] catheter with  tip terminating over the proximal right main pulmonary artery. Enteric  tube courses below the left hemidiaphragm, tip is not within view.  NG/OG tube courses below the left hemidiaphragm, tip is not within  view. Right upper extremity PICC line, tip is obscured by additional  lines projecting over the mediastinum. Intact clam shell sternotomy  wires. Cutaneous staples project over the low thorax.    Postsurgical changes of bilateral lung transplant. Trachea is midline.  The cardiomediastinal silhouette is indistinct. Small bilateral  pleural effusions. Bibasilar atelectasis. No appreciable pneumothorax.  Slightly decreased bilateral interstitial and airspace opacities.    Partially visualized upper abdomen is unremarkable. No acute osseous  abnormality. Extensive subcutaneous emphysema along the left chest  wall and base of the right neck.      Impression    IMPRESSION:  1. Postsurgical changes of bilateral lung transplant.  2. Stable support devices.  3. Small bilateral pleural effusions with overlying atelectasis and  decreased bilateral interstitial and airspace opacities.  4. No appreciable pneumothorax.    I have personally reviewed the examination and initial interpretation  and I agree with the findings.    MAURICIO NAVAS MD         SYSTEM ID:  [id number]

## 2021-10-24 NOTE — PROGRESS NOTES
CLINICAL NUTRITION SERVICES - BRIEF NOTE     Nutrition Prescription    Recommendations already ordered by Registered Dietitian (RD):  Trickle feeds: Osmolite 1.5 @ 20 mL/hr via OGT    Future/Additional Recommendations:  Once post-pyloric FT placed by Radiology and confirmed okay for use recommend:   Osmolite 1.5 Conner @ goal of 60ml/hr (1440ml/day) + 1 packet ProSource BID (2 pkts total) will provide: 2240 kcals (35 kca/kg), 112 g PRO (1.8 g/kg), 1097 ml free H20, 293 g CHO, and 0 g fiber daily.  - Initiate @ 20 ml/hr and advance by 10 ml q8hr pending pt's tolerance  - Do not start or advance TF rate unless Mg++ >1.5, K+ is >3, and phos >1.9   See RD note 10/19 for full assessment    FINDINGS   Received page from RN- pt only has OG access currently   FT placement by radiology is ordered     INTERVENTIONS  Implementation  Enteral Nutrition - trickle feeds     Monitoring/Evaluation  Will continue to monitor and evaluate per protocol.    Kenna Torres, RD, LD  [facility] RD pager [phone]  Weekend RD pager [phone]

## 2021-10-25 ENCOUNTER — APPOINTMENT (OUTPATIENT)
Dept: GENERAL RADIOLOGY | Facility: CLINIC | Age: 56
End: 2021-10-25
Attending: STUDENT IN AN ORGANIZED HEALTH CARE EDUCATION/TRAINING PROGRAM
Payer: COMMERCIAL

## 2021-10-25 ENCOUNTER — APPOINTMENT (OUTPATIENT)
Dept: CT IMAGING | Facility: CLINIC | Age: 56
End: 2021-10-25
Attending: STUDENT IN AN ORGANIZED HEALTH CARE EDUCATION/TRAINING PROGRAM
Payer: COMMERCIAL

## 2021-10-25 ENCOUNTER — APPOINTMENT (OUTPATIENT)
Dept: PHYSICAL THERAPY | Facility: CLINIC | Age: 56
End: 2021-10-25
Attending: STUDENT IN AN ORGANIZED HEALTH CARE EDUCATION/TRAINING PROGRAM
Payer: COMMERCIAL

## 2021-10-25 ENCOUNTER — APPOINTMENT (OUTPATIENT)
Dept: CARDIOLOGY | Facility: CLINIC | Age: 56
End: 2021-10-25
Attending: STUDENT IN AN ORGANIZED HEALTH CARE EDUCATION/TRAINING PROGRAM
Payer: COMMERCIAL

## 2021-10-25 LAB
ALBUMIN SERPL-MCNC: 1.6 G/DL (ref 3.4–5)
ALBUMIN SERPL-MCNC: 1.7 G/DL (ref 3.4–5)
ALBUMIN UR-MCNC: 20 MG/DL
ALP SERPL-CCNC: 157 U/L (ref 40–150)
ALP SERPL-CCNC: 163 U/L (ref 40–150)
ALP SERPL-CCNC: 174 U/L (ref 40–150)
ALP SERPL-CCNC: 175 U/L (ref 40–150)
ALT SERPL W P-5'-P-CCNC: 101 U/L (ref 0–70)
ALT SERPL W P-5'-P-CCNC: 111 U/L (ref 0–70)
ALT SERPL W P-5'-P-CCNC: 84 U/L (ref 0–70)
ALT SERPL W P-5'-P-CCNC: 88 U/L (ref 0–70)
AMMONIA PLAS-SCNC: 29 UMOL/L (ref 10–50)
ANION GAP SERPL CALCULATED.3IONS-SCNC: 5 MMOL/L (ref 3–14)
ANION GAP SERPL CALCULATED.3IONS-SCNC: 6 MMOL/L (ref 3–14)
ANION GAP SERPL CALCULATED.3IONS-SCNC: 7 MMOL/L (ref 3–14)
ANION GAP SERPL CALCULATED.3IONS-SCNC: 8 MMOL/L (ref 3–14)
APPEARANCE UR: CLEAR
AST SERPL W P-5'-P-CCNC: 20 U/L (ref 0–45)
AST SERPL W P-5'-P-CCNC: 20 U/L (ref 0–45)
AST SERPL W P-5'-P-CCNC: 21 U/L (ref 0–45)
AST SERPL W P-5'-P-CCNC: 22 U/L (ref 0–45)
BACTERIA BLD CULT: ABNORMAL
BACTERIA BLD CULT: ABNORMAL
BACTERIA BLD CULT: NO GROWTH
BASE EXCESS BLDA CALC-SCNC: 1.2 MMOL/L (ref -9–1.8)
BASE EXCESS BLDA CALC-SCNC: 3.6 MMOL/L (ref -9–1.8)
BASE EXCESS BLDA CALC-SCNC: 4 MMOL/L (ref -9–1.8)
BASE EXCESS BLDA CALC-SCNC: 4.7 MMOL/L (ref -9–1.8)
BASE EXCESS BLDV CALC-SCNC: 2.9 MMOL/L (ref -7.7–1.9)
BASOPHILS # BLD AUTO: 0.1 10E3/UL (ref 0–0.2)
BASOPHILS NFR BLD AUTO: 0 %
BILIRUB DIRECT SERPL-MCNC: 0.1 MG/DL (ref 0–0.2)
BILIRUB DIRECT SERPL-MCNC: 0.1 MG/DL (ref 0–0.2)
BILIRUB DIRECT SERPL-MCNC: <0.1 MG/DL (ref 0–0.2)
BILIRUB DIRECT SERPL-MCNC: <0.1 MG/DL (ref 0–0.2)
BILIRUB SERPL-MCNC: 0.3 MG/DL (ref 0.2–1.3)
BILIRUB SERPL-MCNC: 0.5 MG/DL (ref 0.2–1.3)
BILIRUB UR QL STRIP: NEGATIVE
BUN SERPL-MCNC: 51 MG/DL (ref 7–30)
BUN SERPL-MCNC: 57 MG/DL (ref 7–30)
BUN SERPL-MCNC: 59 MG/DL (ref 7–30)
BUN SERPL-MCNC: 62 MG/DL (ref 7–30)
CALCIUM SERPL-MCNC: 8.6 MG/DL (ref 8.5–10.1)
CALCIUM SERPL-MCNC: 8.7 MG/DL (ref 8.5–10.1)
CALCIUM SERPL-MCNC: 8.9 MG/DL (ref 8.5–10.1)
CALCIUM SERPL-MCNC: 9 MG/DL (ref 8.5–10.1)
CHLORIDE BLD-SCNC: 110 MMOL/L (ref 94–109)
CHLORIDE BLD-SCNC: 113 MMOL/L (ref 94–109)
CHLORIDE BLD-SCNC: 114 MMOL/L (ref 94–109)
CHLORIDE BLD-SCNC: 116 MMOL/L (ref 94–109)
CO2 SERPL-SCNC: 25 MMOL/L (ref 20–32)
CO2 SERPL-SCNC: 26 MMOL/L (ref 20–32)
CO2 SERPL-SCNC: 27 MMOL/L (ref 20–32)
CO2 SERPL-SCNC: 28 MMOL/L (ref 20–32)
COLOR UR AUTO: ABNORMAL
CREAT SERPL-MCNC: 0.84 MG/DL (ref 0.66–1.25)
CREAT SERPL-MCNC: 0.93 MG/DL (ref 0.66–1.25)
CREAT SERPL-MCNC: 1.18 MG/DL (ref 0.66–1.25)
CREAT SERPL-MCNC: 1.26 MG/DL (ref 0.66–1.25)
EOSINOPHIL # BLD AUTO: 0 10E3/UL (ref 0–0.7)
EOSINOPHIL NFR BLD AUTO: 0 %
ERYTHROCYTE [DISTWIDTH] IN BLOOD BY AUTOMATED COUNT: 15.9 % (ref 10–15)
GFR SERPL CREATININE-BSD FRML MDRD: 63 ML/MIN/1.73M2
GFR SERPL CREATININE-BSD FRML MDRD: 69 ML/MIN/1.73M2
GFR SERPL CREATININE-BSD FRML MDRD: >90 ML/MIN/1.73M2
GFR SERPL CREATININE-BSD FRML MDRD: >90 ML/MIN/1.73M2
GLUCOSE BLD-MCNC: 166 MG/DL (ref 70–99)
GLUCOSE BLD-MCNC: 192 MG/DL (ref 70–99)
GLUCOSE BLD-MCNC: 206 MG/DL (ref 70–99)
GLUCOSE BLD-MCNC: 219 MG/DL (ref 70–99)
GLUCOSE BLDC GLUCOMTR-MCNC: 171 MG/DL (ref 70–99)
GLUCOSE BLDC GLUCOMTR-MCNC: 180 MG/DL (ref 70–99)
GLUCOSE BLDC GLUCOMTR-MCNC: 186 MG/DL (ref 70–99)
GLUCOSE BLDC GLUCOMTR-MCNC: 200 MG/DL (ref 70–99)
GLUCOSE BLDC GLUCOMTR-MCNC: 201 MG/DL (ref 70–99)
GLUCOSE UR STRIP-MCNC: NEGATIVE MG/DL
HCO3 BLD-SCNC: 24 MMOL/L (ref 21–28)
HCO3 BLD-SCNC: 26 MMOL/L (ref 21–28)
HCO3 BLD-SCNC: 27 MMOL/L (ref 21–28)
HCO3 BLD-SCNC: 28 MMOL/L (ref 21–28)
HCO3 BLDV-SCNC: 27 MMOL/L (ref 21–28)
HCT VFR BLD AUTO: 33.1 % (ref 40–53)
HGB BLD-MCNC: 10.6 G/DL (ref 13.3–17.7)
HGB UR QL STRIP: NEGATIVE
HOLD SPECIMEN: NORMAL
IMM GRANULOCYTES # BLD: 1.5 10E3/UL
IMM GRANULOCYTES NFR BLD: 4 %
INR PPP: 1.23 (ref 0.85–1.15)
INR PPP: 1.27 (ref 0.85–1.15)
KETONES UR STRIP-MCNC: NEGATIVE MG/DL
LEUKOCYTE ESTERASE UR QL STRIP: NEGATIVE
LYMPHOCYTES # BLD AUTO: 1.2 10E3/UL (ref 0.8–5.3)
LYMPHOCYTES NFR BLD AUTO: 4 %
MAGNESIUM SERPL-MCNC: 1.7 MG/DL (ref 1.6–2.3)
MAGNESIUM SERPL-MCNC: 1.8 MG/DL (ref 1.6–2.3)
MCH RBC QN AUTO: 30 PG (ref 26.5–33)
MCHC RBC AUTO-ENTMCNC: 32 G/DL (ref 31.5–36.5)
MCV RBC AUTO: 94 FL (ref 78–100)
MONOCYTES # BLD AUTO: 2.1 10E3/UL (ref 0–1.3)
MONOCYTES NFR BLD AUTO: 6 %
MUCOUS THREADS #/AREA URNS LPF: PRESENT /LPF
NEUTROPHILS # BLD AUTO: 28 10E3/UL (ref 1.6–8.3)
NEUTROPHILS NFR BLD AUTO: 86 %
NITRATE UR QL: NEGATIVE
NRBC # BLD AUTO: 0 10E3/UL
NRBC BLD AUTO-RTO: 0 /100
O2/TOTAL GAS SETTING VFR VENT: 50 %
O2/TOTAL GAS SETTING VFR VENT: 55 %
OXYHGB MFR BLDV: 94 % (ref 70–75)
PCO2 BLD: 31 MM HG (ref 35–45)
PCO2 BLD: 33 MM HG (ref 35–45)
PCO2 BLD: 34 MM HG (ref 35–45)
PCO2 BLD: 34 MM HG (ref 35–45)
PCO2 BLDV: 36 MM HG (ref 40–50)
PH BLD: 7.5 [PH] (ref 7.35–7.45)
PH BLD: 7.51 [PH] (ref 7.35–7.45)
PH BLD: 7.52 [PH] (ref 7.35–7.45)
PH BLD: 7.52 [PH] (ref 7.35–7.45)
PH BLDV: 7.47 [PH] (ref 7.32–7.43)
PH UR STRIP: 5.5 [PH] (ref 5–7)
PHOSPHATE SERPL-MCNC: 2.6 MG/DL (ref 2.5–4.5)
PHOSPHATE SERPL-MCNC: 2.6 MG/DL (ref 2.5–4.5)
PHOSPHATE SERPL-MCNC: 3 MG/DL (ref 2.5–4.5)
PLATELET # BLD AUTO: 407 10E3/UL (ref 150–450)
PO2 BLD: 104 MM HG (ref 80–105)
PO2 BLD: 110 MM HG (ref 80–105)
PO2 BLD: 74 MM HG (ref 80–105)
PO2 BLD: 87 MM HG (ref 80–105)
PO2 BLDV: 78 MM HG (ref 25–47)
POTASSIUM BLD-SCNC: 3.9 MMOL/L (ref 3.4–5.3)
POTASSIUM BLD-SCNC: 4.3 MMOL/L (ref 3.4–5.3)
POTASSIUM BLD-SCNC: 4.7 MMOL/L (ref 3.4–5.3)
POTASSIUM BLD-SCNC: 4.7 MMOL/L (ref 3.4–5.3)
PROT SERPL-MCNC: 5.6 G/DL (ref 6.8–8.8)
PROT SERPL-MCNC: 5.7 G/DL (ref 6.8–8.8)
PROT SERPL-MCNC: 5.7 G/DL (ref 6.8–8.8)
PROT SERPL-MCNC: 6.1 G/DL (ref 6.8–8.8)
RADIOLOGIST FLAGS: ABNORMAL
RBC # BLD AUTO: 3.53 10E6/UL (ref 4.4–5.9)
RBC URINE: 1 /HPF
SODIUM SERPL-SCNC: 145 MMOL/L (ref 133–144)
SODIUM SERPL-SCNC: 146 MMOL/L (ref 133–144)
SODIUM SERPL-SCNC: 146 MMOL/L (ref 133–144)
SODIUM SERPL-SCNC: 148 MMOL/L (ref 133–144)
SP GR UR STRIP: 1.01 (ref 1–1.03)
TACROLIMUS BLD-MCNC: 11.6 UG/L (ref 5–15)
TME LAST DOSE: NORMAL H
TME LAST DOSE: NORMAL H
UFH PPP CHRO-ACNC: 0.31 IU/ML
UROBILINOGEN UR STRIP-MCNC: NORMAL MG/DL
WBC # BLD AUTO: 32.8 10E3/UL (ref 4–11)
WBC URINE: 2 /HPF

## 2021-10-25 PROCEDURE — 99291 CRITICAL CARE FIRST HOUR: CPT | Mod: 24 | Performed by: INTERNAL MEDICINE

## 2021-10-25 PROCEDURE — 71045 X-RAY EXAM CHEST 1 VIEW: CPT | Mod: 26 | Performed by: STUDENT IN AN ORGANIZED HEALTH CARE EDUCATION/TRAINING PROGRAM

## 2021-10-25 PROCEDURE — 74177 CT ABD & PELVIS W/CONTRAST: CPT | Mod: 26 | Performed by: STUDENT IN AN ORGANIZED HEALTH CARE EDUCATION/TRAINING PROGRAM

## 2021-10-25 PROCEDURE — 85610 PROTHROMBIN TIME: CPT | Performed by: INTERNAL MEDICINE

## 2021-10-25 PROCEDURE — 82248 BILIRUBIN DIRECT: CPT | Performed by: INTERNAL MEDICINE

## 2021-10-25 PROCEDURE — 250N000009 HC RX 250: Performed by: STUDENT IN AN ORGANIZED HEALTH CARE EDUCATION/TRAINING PROGRAM

## 2021-10-25 PROCEDURE — 250N000012 HC RX MED GY IP 250 OP 636 PS 637: Performed by: PHYSICIAN ASSISTANT

## 2021-10-25 PROCEDURE — 82803 BLOOD GASES ANY COMBINATION: CPT | Performed by: INTERNAL MEDICINE

## 2021-10-25 PROCEDURE — 258N000003 HC RX IP 258 OP 636: Performed by: STUDENT IN AN ORGANIZED HEALTH CARE EDUCATION/TRAINING PROGRAM

## 2021-10-25 PROCEDURE — 94640 AIRWAY INHALATION TREATMENT: CPT

## 2021-10-25 PROCEDURE — 99233 SBSQ HOSP IP/OBS HIGH 50: CPT | Performed by: INTERNAL MEDICINE

## 2021-10-25 PROCEDURE — 94003 VENT MGMT INPAT SUBQ DAY: CPT

## 2021-10-25 PROCEDURE — 84450 TRANSFERASE (AST) (SGOT): CPT | Performed by: INTERNAL MEDICINE

## 2021-10-25 PROCEDURE — 70450 CT HEAD/BRAIN W/O DYE: CPT

## 2021-10-25 PROCEDURE — 82140 ASSAY OF AMMONIA: CPT

## 2021-10-25 PROCEDURE — 97164 PT RE-EVAL EST PLAN CARE: CPT | Mod: GP

## 2021-10-25 PROCEDURE — 81001 URINALYSIS AUTO W/SCOPE: CPT | Performed by: STUDENT IN AN ORGANIZED HEALTH CARE EDUCATION/TRAINING PROGRAM

## 2021-10-25 PROCEDURE — 250N000009 HC RX 250: Performed by: NURSE PRACTITIONER

## 2021-10-25 PROCEDURE — 250N000011 HC RX IP 250 OP 636: Performed by: ANESTHESIOLOGY

## 2021-10-25 PROCEDURE — 93320 DOPPLER ECHO COMPLETE: CPT | Mod: 26 | Performed by: INTERNAL MEDICINE

## 2021-10-25 PROCEDURE — 87205 SMEAR GRAM STAIN: CPT

## 2021-10-25 PROCEDURE — 87106 FUNGI IDENTIFICATION YEAST: CPT

## 2021-10-25 PROCEDURE — 87040 BLOOD CULTURE FOR BACTERIA: CPT

## 2021-10-25 PROCEDURE — 250N000011 HC RX IP 250 OP 636: Performed by: STUDENT IN AN ORGANIZED HEALTH CARE EDUCATION/TRAINING PROGRAM

## 2021-10-25 PROCEDURE — 93325 DOPPLER ECHO COLOR FLOW MAPG: CPT

## 2021-10-25 PROCEDURE — 84100 ASSAY OF PHOSPHORUS: CPT | Performed by: INTERNAL MEDICINE

## 2021-10-25 PROCEDURE — 70496 CT ANGIOGRAPHY HEAD: CPT

## 2021-10-25 PROCEDURE — 200N000002 HC R&B ICU UMMC

## 2021-10-25 PROCEDURE — 83735 ASSAY OF MAGNESIUM: CPT | Performed by: INTERNAL MEDICINE

## 2021-10-25 PROCEDURE — 70496 CT ANGIOGRAPHY HEAD: CPT | Mod: 26 | Performed by: RADIOLOGY

## 2021-10-25 PROCEDURE — 87106 FUNGI IDENTIFICATION YEAST: CPT | Performed by: PHYSICIAN ASSISTANT

## 2021-10-25 PROCEDURE — 80197 ASSAY OF TACROLIMUS: CPT | Performed by: NURSE PRACTITIONER

## 2021-10-25 PROCEDURE — 250N000013 HC RX MED GY IP 250 OP 250 PS 637: Performed by: ANESTHESIOLOGY

## 2021-10-25 PROCEDURE — 93325 DOPPLER ECHO COLOR FLOW MAPG: CPT | Mod: 26 | Performed by: INTERNAL MEDICINE

## 2021-10-25 PROCEDURE — 71260 CT THORAX DX C+: CPT | Mod: 26 | Performed by: STUDENT IN AN ORGANIZED HEALTH CARE EDUCATION/TRAINING PROGRAM

## 2021-10-25 PROCEDURE — 250N000013 HC RX MED GY IP 250 OP 250 PS 637

## 2021-10-25 PROCEDURE — 999N000176 HC STATISTIC STROKE CODE W/O ACCESS

## 2021-10-25 PROCEDURE — 999N000157 HC STATISTIC RCP TIME EA 10 MIN

## 2021-10-25 PROCEDURE — 71260 CT THORAX DX C+: CPT

## 2021-10-25 PROCEDURE — 94668 MNPJ CHEST WALL SBSQ: CPT

## 2021-10-25 PROCEDURE — C9113 INJ PANTOPRAZOLE SODIUM, VIA: HCPCS | Performed by: ANESTHESIOLOGY

## 2021-10-25 PROCEDURE — 80048 BASIC METABOLIC PNL TOTAL CA: CPT | Performed by: INTERNAL MEDICINE

## 2021-10-25 PROCEDURE — 999N000015 HC STATISTIC ARTERIAL MONITORING DAILY

## 2021-10-25 PROCEDURE — 71045 X-RAY EXAM CHEST 1 VIEW: CPT

## 2021-10-25 PROCEDURE — 93312 ECHO TRANSESOPHAGEAL: CPT | Mod: 26 | Performed by: INTERNAL MEDICINE

## 2021-10-25 PROCEDURE — 999N000128 HC STATISTIC PERIPHERAL IV START W/O US GUIDANCE

## 2021-10-25 PROCEDURE — 85520 HEPARIN ASSAY: CPT | Performed by: ANESTHESIOLOGY

## 2021-10-25 PROCEDURE — 99233 SBSQ HOSP IP/OBS HIGH 50: CPT | Mod: 24 | Performed by: INTERNAL MEDICINE

## 2021-10-25 PROCEDURE — 250N000012 HC RX MED GY IP 250 OP 636 PS 637: Performed by: NURSE PRACTITIONER

## 2021-10-25 PROCEDURE — 94640 AIRWAY INHALATION TREATMENT: CPT | Mod: 76

## 2021-10-25 PROCEDURE — 71045 X-RAY EXAM CHEST 1 VIEW: CPT | Mod: 77

## 2021-10-25 PROCEDURE — 250N000011 HC RX IP 250 OP 636

## 2021-10-25 PROCEDURE — 74340 X-RAY GUIDE FOR GI TUBE: CPT

## 2021-10-25 PROCEDURE — 82805 BLOOD GASES W/O2 SATURATION: CPT | Performed by: STUDENT IN AN ORGANIZED HEALTH CARE EDUCATION/TRAINING PROGRAM

## 2021-10-25 PROCEDURE — 250N000013 HC RX MED GY IP 250 OP 250 PS 637: Performed by: NURSE PRACTITIONER

## 2021-10-25 PROCEDURE — 36415 COLL VENOUS BLD VENIPUNCTURE: CPT | Performed by: INTERNAL MEDICINE

## 2021-10-25 PROCEDURE — 74340 X-RAY GUIDE FOR GI TUBE: CPT | Mod: 26 | Performed by: RADIOLOGY

## 2021-10-25 PROCEDURE — 250N000009 HC RX 250: Performed by: THORACIC SURGERY (CARDIOTHORACIC VASCULAR SURGERY)

## 2021-10-25 PROCEDURE — 258N000003 HC RX IP 258 OP 636: Performed by: THORACIC SURGERY (CARDIOTHORACIC VASCULAR SURGERY)

## 2021-10-25 PROCEDURE — 97530 THERAPEUTIC ACTIVITIES: CPT | Mod: GP

## 2021-10-25 PROCEDURE — 71045 X-RAY EXAM CHEST 1 VIEW: CPT | Mod: 26 | Performed by: RADIOLOGY

## 2021-10-25 PROCEDURE — 70498 CT ANGIOGRAPHY NECK: CPT | Mod: 26 | Performed by: RADIOLOGY

## 2021-10-25 PROCEDURE — 250N000011 HC RX IP 250 OP 636: Performed by: THORACIC SURGERY (CARDIOTHORACIC VASCULAR SURGERY)

## 2021-10-25 PROCEDURE — 250N000013 HC RX MED GY IP 250 OP 250 PS 637: Performed by: STUDENT IN AN ORGANIZED HEALTH CARE EDUCATION/TRAINING PROGRAM

## 2021-10-25 PROCEDURE — 84100 ASSAY OF PHOSPHORUS: CPT | Performed by: STUDENT IN AN ORGANIZED HEALTH CARE EDUCATION/TRAINING PROGRAM

## 2021-10-25 PROCEDURE — 250N000009 HC RX 250: Performed by: RADIOLOGY

## 2021-10-25 PROCEDURE — 87102 FUNGUS ISOLATION CULTURE: CPT | Performed by: PHYSICIAN ASSISTANT

## 2021-10-25 PROCEDURE — 85025 COMPLETE CBC W/AUTO DIFF WBC: CPT | Performed by: STUDENT IN AN ORGANIZED HEALTH CARE EDUCATION/TRAINING PROGRAM

## 2021-10-25 PROCEDURE — 99207 PR NON-BILLABLE SERV PER CHARTING: CPT | Performed by: PHYSICIAN ASSISTANT

## 2021-10-25 PROCEDURE — 36415 COLL VENOUS BLD VENIPUNCTURE: CPT

## 2021-10-25 PROCEDURE — 36415 COLL VENOUS BLD VENIPUNCTURE: CPT | Performed by: PHYSICIAN ASSISTANT

## 2021-10-25 PROCEDURE — 99232 SBSQ HOSP IP/OBS MODERATE 35: CPT | Mod: GC | Performed by: PSYCHIATRY & NEUROLOGY

## 2021-10-25 PROCEDURE — 44500 INTRO GASTROINTESTINAL TUBE: CPT

## 2021-10-25 RX ORDER — IOPAMIDOL 755 MG/ML
75 INJECTION, SOLUTION INTRAVASCULAR ONCE
Status: COMPLETED | OUTPATIENT
Start: 2021-10-25 | End: 2021-10-25

## 2021-10-25 RX ORDER — PROPOFOL 10 MG/ML
5-75 INJECTION, EMULSION INTRAVENOUS CONTINUOUS
Status: DISCONTINUED | OUTPATIENT
Start: 2021-10-25 | End: 2021-10-27

## 2021-10-25 RX ORDER — LABETALOL HYDROCHLORIDE 5 MG/ML
20 INJECTION, SOLUTION INTRAVENOUS ONCE
Status: DISCONTINUED | OUTPATIENT
Start: 2021-10-25 | End: 2021-10-25

## 2021-10-25 RX ORDER — LABETALOL HYDROCHLORIDE 5 MG/ML
20 INJECTION, SOLUTION INTRAVENOUS EVERY 6 HOURS PRN
Status: DISCONTINUED | OUTPATIENT
Start: 2021-10-25 | End: 2021-10-26

## 2021-10-25 RX ORDER — LIDOCAINE HYDROCHLORIDE 20 MG/ML
5 SOLUTION OROPHARYNGEAL
Status: DISCONTINUED | OUTPATIENT
Start: 2021-10-25 | End: 2021-12-13 | Stop reason: HOSPADM

## 2021-10-25 RX ORDER — PROPOFOL 10 MG/ML
INJECTION, EMULSION INTRAVENOUS
Status: COMPLETED
Start: 2021-10-25 | End: 2021-10-25

## 2021-10-25 RX ORDER — CARVEDILOL 6.25 MG/1
12.5 TABLET ORAL 2 TIMES DAILY
Status: DISCONTINUED | OUTPATIENT
Start: 2021-10-25 | End: 2021-11-01

## 2021-10-25 RX ORDER — HYDRALAZINE HYDROCHLORIDE 50 MG/1
100 TABLET, FILM COATED ORAL EVERY 6 HOURS PRN
Status: DISCONTINUED | OUTPATIENT
Start: 2021-10-25 | End: 2021-10-25

## 2021-10-25 RX ORDER — HYDRALAZINE HYDROCHLORIDE 50 MG/1
100 TABLET, FILM COATED ORAL EVERY 6 HOURS
Status: DISCONTINUED | OUTPATIENT
Start: 2021-10-25 | End: 2021-11-01

## 2021-10-25 RX ORDER — NICOTINE POLACRILEX 4 MG
15-30 LOZENGE BUCCAL
Status: DISCONTINUED | OUTPATIENT
Start: 2021-10-25 | End: 2021-10-26

## 2021-10-25 RX ORDER — IOPAMIDOL 755 MG/ML
99 INJECTION, SOLUTION INTRAVASCULAR ONCE
Status: COMPLETED | OUTPATIENT
Start: 2021-10-25 | End: 2021-10-25

## 2021-10-25 RX ORDER — HYDRALAZINE HYDROCHLORIDE 50 MG/1
100 TABLET, FILM COATED ORAL EVERY 8 HOURS SCHEDULED
Status: DISCONTINUED | OUTPATIENT
Start: 2021-10-25 | End: 2021-10-25

## 2021-10-25 RX ORDER — DEXTROSE MONOHYDRATE 25 G/50ML
25-50 INJECTION, SOLUTION INTRAVENOUS
Status: DISCONTINUED | OUTPATIENT
Start: 2021-10-25 | End: 2021-10-26

## 2021-10-25 RX ADMIN — ISOSORBIDE DINITRATE 40 MG: 10 TABLET ORAL at 17:58

## 2021-10-25 RX ADMIN — ACETYLCYSTEINE 2 ML: 200 SOLUTION ORAL; RESPIRATORY (INHALATION) at 20:09

## 2021-10-25 RX ADMIN — OXYCODONE HYDROCHLORIDE 10 MG: 10 TABLET ORAL at 05:39

## 2021-10-25 RX ADMIN — NYSTATIN 1000000 UNITS: 500000 SUSPENSION ORAL at 11:35

## 2021-10-25 RX ADMIN — CARVEDILOL 12.5 MG: 6.25 TABLET, FILM COATED ORAL at 09:11

## 2021-10-25 RX ADMIN — METHOCARBAMOL 750 MG: 750 TABLET ORAL at 05:39

## 2021-10-25 RX ADMIN — ACETYLCYSTEINE 2 ML: 200 SOLUTION ORAL; RESPIRATORY (INHALATION) at 11:38

## 2021-10-25 RX ADMIN — MYCOPHENOLATE MOFETIL 1500 MG: 200 POWDER, FOR SUSPENSION ORAL at 08:24

## 2021-10-25 RX ADMIN — HEPARIN SODIUM 700 UNITS/HR: 10000 INJECTION, SOLUTION INTRAVENOUS at 17:59

## 2021-10-25 RX ADMIN — PROPOFOL 20 MCG/KG/MIN: 10 INJECTION, EMULSION INTRAVENOUS at 09:54

## 2021-10-25 RX ADMIN — HYDRALAZINE HYDROCHLORIDE 75 MG: 50 TABLET, FILM COATED ORAL at 05:29

## 2021-10-25 RX ADMIN — SODIUM CHLORIDE, PRESERVATIVE FREE 90 ML: 5 INJECTION INTRAVENOUS at 21:44

## 2021-10-25 RX ADMIN — HYDRALAZINE HYDROCHLORIDE 100 MG: 50 TABLET, FILM COATED ORAL at 15:23

## 2021-10-25 RX ADMIN — LEVOTHYROXINE SODIUM 25 MCG: 0.03 TABLET ORAL at 08:22

## 2021-10-25 RX ADMIN — AMIODARONE HYDROCHLORIDE 400 MG: 200 TABLET ORAL at 19:45

## 2021-10-25 RX ADMIN — LEVALBUTEROL HYDROCHLORIDE 1.25 MG: 1.25 SOLUTION RESPIRATORY (INHALATION) at 16:26

## 2021-10-25 RX ADMIN — CARVEDILOL 12.5 MG: 6.25 TABLET, FILM COATED ORAL at 19:45

## 2021-10-25 RX ADMIN — ACETAMINOPHEN 975 MG: 325 TABLET, FILM COATED ORAL at 17:59

## 2021-10-25 RX ADMIN — ACETYLCYSTEINE 2 ML: 200 SOLUTION ORAL; RESPIRATORY (INHALATION) at 07:40

## 2021-10-25 RX ADMIN — PANTOPRAZOLE SODIUM 40 MG: 40 INJECTION, POWDER, FOR SOLUTION INTRAVENOUS at 08:22

## 2021-10-25 RX ADMIN — AMLODIPINE BESYLATE 10 MG: 10 TABLET ORAL at 08:23

## 2021-10-25 RX ADMIN — NYSTATIN 1000000 UNITS: 500000 SUSPENSION ORAL at 19:45

## 2021-10-25 RX ADMIN — PREDNISONE 15 MG: 5 TABLET ORAL at 19:45

## 2021-10-25 RX ADMIN — LEVALBUTEROL HYDROCHLORIDE 1.25 MG: 1.25 SOLUTION RESPIRATORY (INHALATION) at 11:38

## 2021-10-25 RX ADMIN — METHOCARBAMOL 750 MG: 750 TABLET ORAL at 18:00

## 2021-10-25 RX ADMIN — NICARDIPINE HYDROCHLORIDE 10 MG/HR: 2.5 INJECTION INTRAVENOUS at 05:08

## 2021-10-25 RX ADMIN — ACYCLOVIR 400 MG: 200 SUSPENSION ORAL at 19:47

## 2021-10-25 RX ADMIN — MICAFUNGIN SODIUM 100 MG: 50 INJECTION, POWDER, LYOPHILIZED, FOR SOLUTION INTRAVENOUS at 08:31

## 2021-10-25 RX ADMIN — PANTOPRAZOLE SODIUM 40 MG: 40 INJECTION, POWDER, FOR SOLUTION INTRAVENOUS at 15:44

## 2021-10-25 RX ADMIN — Medication 1 PACKET: at 19:46

## 2021-10-25 RX ADMIN — PREDNISONE 15 MG: 5 TABLET ORAL at 08:21

## 2021-10-25 RX ADMIN — SULFAMETHOXAZOLE AND TRIMETHOPRIM 1 TABLET: 400; 80 TABLET ORAL at 08:22

## 2021-10-25 RX ADMIN — LIDOCAINE HYDROCHLORIDE 5 ML: 20 SOLUTION ORAL; TOPICAL at 13:51

## 2021-10-25 RX ADMIN — ACYCLOVIR 400 MG: 200 SUSPENSION ORAL at 08:24

## 2021-10-25 RX ADMIN — POTASSIUM CHLORIDE 20 MEQ: 40 SOLUTION ORAL at 08:21

## 2021-10-25 RX ADMIN — POTASSIUM PHOSPHATE, MONOBASIC AND POTASSIUM PHOSPHATE, DIBASIC 9 MMOL: 224; 236 INJECTION, SOLUTION, CONCENTRATE INTRAVENOUS at 05:09

## 2021-10-25 RX ADMIN — ROSUVASTATIN CALCIUM 10 MG: 10 TABLET, FILM COATED ORAL at 08:22

## 2021-10-25 RX ADMIN — TACROLIMUS 3 MG: 5 CAPSULE ORAL at 18:00

## 2021-10-25 RX ADMIN — ACETYLCYSTEINE 2 ML: 200 SOLUTION ORAL; RESPIRATORY (INHALATION) at 16:26

## 2021-10-25 RX ADMIN — ISOSORBIDE DINITRATE 40 MG: 10 TABLET ORAL at 08:21

## 2021-10-25 RX ADMIN — MYCOPHENOLATE MOFETIL 1500 MG: 200 POWDER, FOR SUSPENSION ORAL at 19:46

## 2021-10-25 RX ADMIN — ACETAMINOPHEN 975 MG: 325 TABLET, FILM COATED ORAL at 02:56

## 2021-10-25 RX ADMIN — BUMETANIDE 2 MG: 0.25 INJECTION, SOLUTION INTRAMUSCULAR; INTRAVENOUS at 08:21

## 2021-10-25 RX ADMIN — LEVALBUTEROL HYDROCHLORIDE 1.25 MG: 1.25 SOLUTION RESPIRATORY (INHALATION) at 07:39

## 2021-10-25 RX ADMIN — ISOSORBIDE DINITRATE 40 MG: 10 TABLET ORAL at 11:34

## 2021-10-25 RX ADMIN — IOPAMIDOL 99 ML: 755 INJECTION, SOLUTION INTRAVENOUS at 12:06

## 2021-10-25 RX ADMIN — HYDRALAZINE HYDROCHLORIDE 100 MG: 50 TABLET, FILM COATED ORAL at 19:01

## 2021-10-25 RX ADMIN — IOPAMIDOL 75 ML: 755 INJECTION, SOLUTION INTRAVENOUS at 21:43

## 2021-10-25 RX ADMIN — NYSTATIN 1000000 UNITS: 500000 SUSPENSION ORAL at 15:23

## 2021-10-25 RX ADMIN — CARVEDILOL 6.25 MG: 6.25 TABLET, FILM COATED ORAL at 08:22

## 2021-10-25 RX ADMIN — ACETAMINOPHEN 975 MG: 325 TABLET, FILM COATED ORAL at 09:11

## 2021-10-25 RX ADMIN — Medication 25 MCG/HR: at 05:34

## 2021-10-25 RX ADMIN — BUMETANIDE 2 MG: 0.25 INJECTION, SOLUTION INTRAMUSCULAR; INTRAVENOUS at 17:58

## 2021-10-25 RX ADMIN — AMIODARONE HYDROCHLORIDE 400 MG: 200 TABLET ORAL at 08:22

## 2021-10-25 RX ADMIN — MULTIVIT AND MINERALS-FERROUS GLUCONATE 9 MG IRON/15 ML ORAL LIQUID 15 ML: at 08:22

## 2021-10-25 RX ADMIN — CALCIUM CARBONATE 600 MG (1,500 MG)-VITAMIN D3 400 UNIT TABLET 1 TABLET: at 08:21

## 2021-10-25 RX ADMIN — BUMETANIDE 2 MG: 0.25 INJECTION, SOLUTION INTRAMUSCULAR; INTRAVENOUS at 11:34

## 2021-10-25 RX ADMIN — CALCIUM CARBONATE 600 MG (1,500 MG)-VITAMIN D3 400 UNIT TABLET 1 TABLET: at 17:59

## 2021-10-25 RX ADMIN — Medication 1 PACKET: at 08:23

## 2021-10-25 RX ADMIN — DOCUSATE SODIUM 50 MG AND SENNOSIDES 8.6 MG 1 TABLET: 8.6; 5 TABLET, FILM COATED ORAL at 08:22

## 2021-10-25 RX ADMIN — NYSTATIN 1000000 UNITS: 500000 SUSPENSION ORAL at 08:21

## 2021-10-25 RX ADMIN — LEVALBUTEROL HYDROCHLORIDE 1.25 MG: 1.25 SOLUTION RESPIRATORY (INHALATION) at 20:09

## 2021-10-25 ASSESSMENT — ACTIVITIES OF DAILY LIVING (ADL)
ADLS_ACUITY_SCORE: 22

## 2021-10-25 ASSESSMENT — MIFFLIN-ST. JEOR: SCORE: 1475

## 2021-10-25 NOTE — PROGRESS NOTES
10/25/21 1700   Quick Adds   Type of Visit PT Re-evaluation   Self-Care   Activity/Exercise/Self-Care Comment See initial evaluation on 9/6 for PLOF and living situation information.    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  Course c/b CVA, encephalopathy, acute respiratory failure, acute kidney injury, fungemia.   Existing Precautions/Restrictions fall;immunosuppressed;oxygen therapy device and L/min;sternal   Cognition   Orientation Status (Cognition) disoriented to;person;place;situation;time   Affect/Mental Status (Cognition) other (see comments)  (intubated, sedated, not following commands)   Follows Commands (Cognition) does not follow one-step commands   Pain Assessment   Patient Currently in Pain   (not additional physiologic symptoms of pain)   Integumentary/Edema   Integumentary/Edema Comments trace edema in hands   Posture    Posture Forward head position   Range of Motion (ROM)   ROM Quick Adds ROM WFL   Strength   Strength Comments Unable to assess due to impaired command following, sedated, not spontaneously moving   Bed Mobility   Comment (Bed Mobility) Dependent   Muscle Tone   Muscle Tone Comments No significant muscle tone deficits noted   Clinical Impression   Criteria for Skilled Therapeutic Intervention yes, treatment indicated   PT Diagnosis (PT) impaired functional mobility   Influenced by the following impairments strength, activity tolerance, alertness, balance, respiratory status, precuations   Functional limitations due to impairments gait, stairs,  transfers, bed mobility, functional endurance   Clinical Presentation Evolving/Changing   Clinical Presentation Rationale clinical reasoning, medically complex with complications   Clinical Decision Making (Complexity) moderate complexity   Therapy Frequency (PT) 2x/week   Planned Therapy Interventions (PT) balance training;bed mobility training;gait training;home exercise program;manual therapy techniques;motor coordination training;neuromuscular re-education;patient/family education;postural re-education;ROM (range of motion);stair training;strengthening;stretching;transfer training   Risk & Benefits of therapy have been explained evaluation/treatment results reviewed;patient;daughter   PT Discharge Planning    PT Discharge Recommendation (DC Rec) Transitional Care Facility   PT Rationale for DC Rec Pt intubated with new lung transplant and CVA, anticipate inpatient rehab will be required but will re-assess pending increased participation and alertness, not appropriate for home discharge at this time due to medical status   PT Brief overview of current status  dependent with lift as appropriate   Total Evaluation Time   Total Evaluation Time (Minutes) 5

## 2021-10-25 NOTE — PROGRESS NOTES
[facility]  Transplant Infectious Disease Progress Note     Patient:  Edson Thornton, Date of birth 1965, Medical record number [id number]  Date of Visit:  10/25/2021         Assessment and Recommendations:   Recommendations:  - Continue micafungin 100 mg IV q24 hours through 11/8/21 (to give a two week course for candidemia from the date of the PICC line's removal).  - Appreciate Ophthalmology Consult's evaluation.  - Can discontinue surveillance blood cultures -- if the presently incubating blood cultures all remain negative, can place a new PICC / central line on 10/27/21.  - Continue acyclovir prophylaxis.  - Would not presently add additional empiric antimicrobial drugs, without a well-defined target or obvious sepsis.    Transplant ID will follow with you.    Prem Soares MD  Pager [phone]    Assessment:  A 56 year old gentleman immunosuppressed (tacrolimus, mycophenolate, prednisone) s/p a 10/16/21 bilateral lung transplant for NSIP / ILD with rheumatoid arthritis who also has a history of bronchiectasis, moderate PH, SALTY, chronic HSV infection, hypogammaglobulinemia, steroid-induced diabetes, hypothyroidism, hypertension, hyperlipidemia, duodenal anomaly, anxiety, and depression.  He was admitted to [facility] on 9/5/21 for acute on chronic respiratory failure due to an ILD exacerbation and underwent lung transplant on 10/16/21.  He had fevers on 10/19/21 and on 10/20/21 and a corresponding 10/20/21 blood culture grew pan-susceptible Candida albicans.  He is stable but persistent intubated (on low vent settings) with a multifactorial encephalopathy.    ID issues:    - Candidemia in a single 10/20/21 blood culture:  Although only one blood culture (out of six post-transplant to that point) grew Candida, that positive blood culture did correspond to a significant fever spike on 10/20/21 late PM and he had a strongly positive 10/20/21 Fungitell assay, so this very possibly  represented a true candidemia and needs to be treated as such.  He is at risk for candidemia due to the presence of multiple lines.  There is no visible site of organ involvement or of hepatosplenic candidiasis on the 10/22/21 and 10/25/21 chest / abdomen CT scans.  A 10/23/21 TTE showed no evidence of valvular vegetations.   A 10/24/21 Ophthalmology Consult examination was benign and 10/22/21 x 2, 10/23/21 x 2, and 10/25/21 x 3 surveillance blood cultures are all without growth to date.  Empiric therapy with IV micafungin at Candida treatment dose 100 mg daily is appropriate to give a two week course through 11/8/21 from the date of the removal of the PICC line on 10/25/21 (with other lines removed previously).  If surveillance blood cultures are still without growth, a new central line can be placed after a 48 hour line holiday, on 10/27/21.    - New, acute, cryptogenic 10/25/21 leukocytosis / fever:  Etiology is unclear.  This is unlikely related to the candidemia.  Would perform a basic fever work-up.  Would not add additional antimicrobials as yet unless a clear new fever source or pathogen is identified (in the absence of fulminant sepsis).    Old ID issues:  - Concern for possible Strongyloides exposure (because a [place]  and in multiple countries with endemic Strongyloides) pre-transplant so received ivermectin dose on 9/17/21.  - Plan for monthly Coccidioides Ag x12 months post-transplant per ID (urine and serum negative 10/17), next due 11/17 (not yet ordered).  Has calcified granulomas on chest CT.  Has a history of residing in a [place]occid[place] endemic areas [place] and[place]or Histoplasma endemic area [place]   - Pre-transplant indeterminate Quantiferon assay:  Assessed 9/16/21 by Transplant ID.  Had been in the [employer] and was stationed in many states including [place] [facility] and [facility]. Also was stationed in [facility] and [facility] and maybe [place].  He was tested with tuberculin skin test on  numerous occasions before he became immunocompromised and was never positive. In addition, he was tested with QuantIFERON on 5/11/2021 prior to, or within 24 hr of receiving 10 mg/day of prednisone, the QuantiFERON was negative.  Deemed to no have LTBI.  - Pneumonia treated with a short course of antibiotics in [place].      Other ID issues:  - QTc interval:  466 msec on 10/22/21 EKG.  - Bacterial prophylaxis:  None indicated, has been on post-transplant ceftazidime.  - Pneumocystis prophylaxis:  On TMP-SMX prior to transplant, now held for BRENNAN.  - Viral serostatus & prophylaxis:  CMV negative.  Chronic history of intermittent active HSV oral outbreaks, on acyclovir.  EBV / VZV seropositive.  - Fungal prophylaxis:  On treatment micafungin.  - Immunization status:  Not presently relevant.  Did not receive influenza, pneumococcal, or Covid-19 vaccines pre-transplant.  - Gamma globulin status:  History of hypogammaglobulinemia.  Received IVIG on 10/21/21.  - Isolation status: Routine.        Interval History:   Mr. Thornton was afebrile yesterday but spiked a new fever to 101.5 degrees early this afternoon, with a rise in his leukocytosis to 32.8 this AM (versus 20 - 23K the prior three days).  He remains on micafungin (since 10/22/21, for candidemia) and acyclovir prophylaxis (since 10/16/21 transplant), but completed a one week (10/16 - 23/21) post-operative empiric ceftazidime course.  He remains intubated and mostly non-interactive on the ventilator at low settings (FiO2 0.50, PEEP 5), although tolerated a pressure support trial yesterday and had been reported yesterday to wiggle his toes to request.  A tracheostomy and PEG tube placement are being considered.  He is being diuresed although his weight is ~ unchanged.  He still has two each bilateral pleural chest tubes and a mediastinal drain in place.  The right arm PICC was removed this PM and a central line holiday is planned.  There are no other recent new  events.  The 10/20/21 blood culture and 10/20/21 and 10/22/21 ETT sputum cultures all grew Candida albicans, with the blood culture isolate pan-susceptible (micafungin TESSIE <0.03, fluconazole TESSIE 0.5).  The other 10/20/21 blood culture as well as 10/22/21 x 2, 10/23/21 x 2, and 10/25/21 x 3 surveillance blood cultures are all without growth to date.  The 10/23/21 serum cryptococcal antigen assay was negative.  10/22/21 and 10/25/21 abdominal CT scans show no evidence of hepatosplenic candidiasis or other intrabdominal infection.  10/25/21 chest CT shows improving bilateral infiltrates.  10/23 TTE showed no vegetations.  A 10/24/21 Ophthalmology Consult examination was benign.        History of Infectious Disease Illness (copied from the 10/23/21 Transplant ID Consult Note):   A 56 year old gentleman immunosuppressed (tacrolimus, mycophenolate, prednisone) s/p a 10/16/21 bilateral lung transplant for NSIP / ILD with rheumatoid arthritis who also has a history of bronchiectasis, moderate PH, SALTY, chronic HSV infection, hypogammaglobulinemia, steroid-induced diabetes, hypothyroidism, hypertension, hyperlipidemia, duodenal anomaly, anxiety, and depression.  He was admitted to [facility] on 9/5/21 for acute on chronic respiratory failure due to an ILD exacerbation and underwent lung transplant on 10/16/21.  The transplant surgery was complicated by early low cardiac index and immediate post-operative PGD as well as later atrial fibrillation with RVR on 10/18/21 as well as BRENNAN.  He is now day 7 post transplant and remains intubated in the CVICU on inhaled nitrous oxide with compromised capacity to wean off sedation.  He had immediate post-operative fevers for ~ 24 hours, then was afebrile for ~ 1.5 days, then respiked fevers up to 101.3 degrees F on 10/19/21 late PM and 10/20/21 late PM.  He had another low grade fever to 100.9 degrees on 10/22/21 PM.  He has been on empiric ceftazidime since transplant, as well as acyclovir  prophylaxis (and was on TMP-SMX prophylaxis prior to transplant).  His post-operative peripheral WBC peaked at 44.5 on 10/18/21, fell to 27.4 by 10/20/21, and is now at 21.3.  Blood cultures x 4 obtained on 10/17/21 were negative, but one of two blood cultures peripherally drawn on 10/20/21 early AM (with the fever spike) grew yeast (ID pending) on 10/22/21 at 52 hours of incubation; the other peripherally drawn blood culture lacks growth to date.  Surveillance blood cultures x 2 from 10/22/21 afternoon are without growth to date.  A Fungitell assay was positive at 399 on 10/20/21.  Of note, respiratory (broncial washing, ETT sputum) cultures obtained 10/17/21 and 10/20/21 have grown Candida albicans along with Staph epidermidis and other normal jean marie.  IV micafungin 100 mg daily was instituted on 10/22/21 late AM.  A 10/22/21 chest / abdominal CT scan did not reveal any likely source.  He has a history of potential Coccidioides exposure, but a 10/17/21 Coccidioides antibody assay was negative and a Coccidioides antigen assay is pending.  This morning he had some blood in his OG tube and underwent upper endoscopy.  He remains intubated but is opening his eyes to auditory stimulation and tracking today, although not moving to request.  He is hemodynamically stable.    Exposure History:  Had been in the [employer] and was stationed in many states including [facility] and [facility]. Also was stationed in [facility] and [facility] and maybe [place].  Has a history of residing in a [place]io[place] endemic areas [place] and/or Histoplasma endemic area ([place]  Extensive travel as a  within the [place].      Transplants:  10/16/2021 (Lung), Postoperative day:  9.  Coordinator Kassandra Estrada    Review of Systems:  ROS is unobtainable because he is intubated and sedated.    Past Medical History:   Diagnosis Date     BRENNAN (acute kidney injury) (H) 10/17/2021     Anxiety      Depression      HLD (hyperlipidemia)      HTN  (hypertension)      Hypothyroidism      ILD (interstitial lung disease) (H)      SALTY on CPAP      Oxygen dependent     BL 4L since ~6/2021     Rheumatoid arthritis (H)     signs ~5/2020, dx 5/2021     S/P lung transplant (H) 10/16/2021     Shock liver 10/17/2021     Steroid-induced hyperglycemia      Traction bronchiectasis (H)      Past Surgical History:   Procedure Laterality Date     COLONOSCOPY W/ BIOPSIES AND POLYPECTOMY  07/21/2020     CV CORONARY ANGIOGRAM N/A 9/8/2021    Procedure: Coronary Angiogram with possible intervention;  Surgeon: Jovon Bullock MD;  Location:  HEART CARDIAC CATH LAB     CV RIGHT HEART CATH MEASUREMENTS RECORDED N/A 9/8/2021    Procedure: Right Heart Cath;  Surgeon: Jovon Bullock MD;  Location:  HEART CARDIAC CATH LAB     ESOPHAGOSCOPY, GASTROSCOPY, DUODENOSCOPY (EGD), COMBINED N/A 10/23/2021    Procedure: ESOPHAGOGASTRODUODENOSCOPY (EGD);  Surgeon: Miquel Pisano MD;  Location: [facility] GI     EXTRACTION(S) DENTAL N/A 9/22/2021    Procedure: EXTRACTION tooth #19;  Surgeon: Deepak Tobin DDS;  Location: [facility] OR     HERNIA REPAIR       right acl       TRANSPLANT LUNG RECIPIENT SINGLE X2 Bilateral 10/16/2021    Procedure: TRANSPLANT, LUNG, RECIPIENT, BILATERAL, Bronchoscopy, on-pump perfusion, bilateral clamshell sternotomy;  Surgeon: Yanick Corral MD;  Location: [facility] OR     XR ACROMIOCLAVICULAR JOINT BILATERAL       Family History   Problem Relation Age of Onset     Diabetes Type 1 Mother      Heart Disease Mother      Chronic Obstructive Pulmonary Disease Mother      Rheumatoid Arthritis Father      Emphysema Paternal Grandfather      Social History     Tobacco Use     Smoking status: Never Smoker     Smokeless tobacco: Never Used   Substance Use Topics     Alcohol use: Never     Drug use: Never       There is no immunization history on file for this patient.    Patient Active Problem List   Diagnosis     S/P lung transplant (H)     BRENNAN (acute  kidney injury) (H)     Shock liver          Current Medications & Allergies:       acetaminophen  975 mg Oral or Feeding Tube Q8H MARKY     acetylcysteine  2 mL Nebulization 4x Daily     acyclovir  400 mg Oral or NG Tube BID     amiodarone  400 mg Oral BID     amLODIPine  10 mg Oral or Feeding Tube Daily     bumetanide  2 mg Intravenous TID     calcium carbonate 600 mg-vitamin D 400 units  1 tablet Oral or Feeding Tube BID w/meals     carvedilol  12.5 mg Oral or Feeding Tube BID     hydrALAZINE  100 mg Oral or Feeding Tube Q6H     isosorbide dinitrate  40 mg Oral or Feeding Tube TID     labetalol  20 mg Intravenous Once     levalbuterol  1.25 mg Nebulization 4x Daily     levothyroxine  25 mcg Oral Daily     micafungin  100 mg Intravenous Q24H     multivitamins w/minerals  15 mL Per Feeding Tube Daily     mycophenolate  1,500 mg Oral or NG Tube BID     nystatin  1,000,000 Units Swish & Swallow 4x Daily     pantoprazole (PROTONIX) IV  40 mg Intravenous BID AC     polyethylene glycol  17 g Oral or Feeding Tube Daily     potassium chloride  20 mEq Oral Daily     predniSONE  15 mg Per Feeding Tube BID     protein modular  1 packet Per Feeding Tube BID     rosuvastatin  10 mg Oral or Feeding Tube Daily     senna-docusate  1 tablet Oral or Feeding Tube BID     sodium chloride (PF)  3 mL Intracatheter Q8H     sulfamethoxazole-trimethoprim  10 mL Oral or NG Tube Daily    Or     sulfamethoxazole-trimethoprim  1 tablet Oral or NG Tube Daily     tacrolimus  3 mg Per OG Tube BID IS     Infusions/Drips:      dextrose Stopped (10/24/21 1008)     dextrose       esmolol Stopped (10/24/21 1231)     fentaNYL 50 mcg/hr (10/25/21 1500)     heparin 700 Units/hr (10/25/21 1500)     insulin regular Stopped (10/24/21 0842)     niCARdipine (CARDENE) infusion ADULT/PEDS GREATER than or EQUAL to 45 kg max conc Stopped (10/25/21 0935)     propofol (DIPRIVAN) infusion 20 mcg/kg/min (10/25/21 1500)     BETA BLOCKER NOT PRESCRIBED       No Known  Allergies         Physical Exam:     Patient Vitals for the past 24 hrs:   BP Temp Temp src Pulse Resp SpO2 Weight   10/25/21 1430 100/62 (!) 100.6  F (38.1  C) -- 80 -- 95 % --   10/25/21 1400 95/76 (!) 100.6  F (38.1  C) -- 77 -- 95 % --   10/25/21 1330 106/65 (!) 100.8  F (38.2  C) -- 80 -- 93 % --   10/25/21 1300 91/60 (!) 100.8  F (38.2  C) -- 81 -- 97 % --   10/25/21 1230 108/61 (!) 100.8  F (38.2  C) Bladder 87 30 97 % --   10/25/21 1145 -- (!) 101.3  F (38.5  C) -- 77 -- 97 % --   10/25/21 1138 -- -- -- 81 -- 97 % --   10/25/21 1130 -- (!) 101.5  F (38.6  C) -- 83 -- 97 % --   10/25/21 1115 -- (!) 101.5  F (38.6  C) -- 87 -- 98 % --   10/25/21 1100 -- (!) 101.5  F (38.6  C) -- 91 -- 97 % --   10/25/21 1045 -- (!) 101.3  F (38.5  C) -- 93 -- 96 % --   10/25/21 1030 -- (!) 101.5  F (38.6  C) -- 94 -- 96 % --   10/25/21 1015 -- (!) 101.3  F (38.5  C) -- 96 -- 95 % --   10/25/21 1000 -- (!) 101.3  F (38.5  C) -- 101 -- 95 % --   10/25/21 0945 -- (!) 101.1  F (38.4  C) -- 101 -- 94 % --   10/25/21 0930 108/63 (!) 101.1  F (38.4  C) -- 108 -- 94 % --   10/25/21 0915 -- (!) 101.1  F (38.4  C) -- 105 -- 98 % --   10/25/21 0900 -- (!) 100.9  F (38.3  C) -- 104 -- 98 % --   10/25/21 0845 -- (!) 101.1  F (38.4  C) -- 102 -- 98 % --   10/25/21 0830 -- (!) 100.9  F (38.3  C) -- 102 -- 98 % --   10/25/21 0815 -- (!) 100.9  F (38.3  C) -- 105 -- 98 % --   10/25/21 0800 -- (!) 100.9  F (38.3  C) Bladder 98 (!) 35 96 % --   10/25/21 0745 -- (!) 100.9  F (38.3  C) -- 105 -- 95 % --   10/25/21 0740 -- -- -- 101 -- 95 % --   10/25/21 0730 -- (!) 100.8  F (38.2  C) -- 101 -- 97 % --   10/25/21 0715 -- (!) 100.8  F (38.2  C) -- 97 -- 96 % --   10/25/21 0700 -- (!) 100.6  F (38.1  C) -- 100 30 96 % --   10/25/21 0645 -- (!) 100.6  F (38.1  C) -- 100 -- 96 % --   10/25/21 0630 -- (!) 100.6  F (38.1  C) -- 97 -- 97 % --   10/25/21 0615 -- (!) 100.6  F (38.1  C) -- 96 -- 96 % --   10/25/21 0600 -- (!) 100.6  F (38.1  C) -- 100 26 97  % --   10/25/21 0545 -- 100.4  F (38  C) -- 100 29 97 % --   10/25/21 0530 -- 100.2  F (37.9  C) -- 100 (!) 31 97 % --   10/25/21 0515 -- 100.2  F (37.9  C) -- 98 28 98 % --   10/25/21 0500 -- 100.2  F (37.9  C) -- 99 25 98 % --   10/25/21 0445 -- 100.4  F (38  C) -- 93 28 98 % --   10/25/21 0432 -- -- -- -- -- 98 % --   10/25/21 0430 -- 100.4  F (38  C) -- 93 28 98 % --   10/25/21 0415 -- (!) 100.6  F (38.1  C) -- 95 (!) 31 98 % --   10/25/21 0400 -- (!) 100.6  F (38.1  C) Bladder 98 (!) 32 97 % --   10/25/21 0345 -- 100.4  F (38  C) -- 92 (!) 33 96 % --   10/25/21 0330 -- 100.4  F (38  C) -- 96 30 97 % --   10/25/21 0315 -- 100.2  F (37.9  C) -- 99 (!) 34 97 % --   10/25/21 0300 -- 100.2  F (37.9  C) -- 98 30 97 % --   10/25/21 0245 -- 100  F (37.8  C) -- 98 (!) 31 97 % --   10/25/21 0230 -- 100  F (37.8  C) -- 98 (!) 33 97 % --   10/25/21 0215 -- 99.9  F (37.7  C) -- 91 23 97 % --   10/25/21 0200 -- 99.9  F (37.7  C) -- 95 29 97 % 73.4 kg (161 lb 13.1 oz)   10/25/21 0145 -- 99.9  F (37.7  C) -- 91 22 97 % --   10/25/21 0130 -- 100  F (37.8  C) -- 96 30 97 % --   10/25/21 0115 -- 100  F (37.8  C) -- 92 (!) 31 97 % --   10/25/21 0100 -- 99.9  F (37.7  C) -- 92 28 98 % --   10/25/21 0045 -- 100  F (37.8  C) -- 90 29 98 % --   10/25/21 0030 -- 100  F (37.8  C) -- 90 (!) 31 99 % --   10/25/21 0023 -- -- -- -- -- 96 % --   10/25/21 0015 -- 99.9  F (37.7  C) -- 84 26 96 % --   10/25/21 0000 -- 100  F (37.8  C) Bladder 85 28 96 % --   10/24/21 2345 -- 100  F (37.8  C) -- 93 (!) 33 91 % --   10/24/21 2330 -- 99.9  F (37.7  C) -- 90 -- 92 % --   10/24/21 2315 -- 99.9  F (37.7  C) -- 90 -- 93 % --   10/24/21 2300 -- 99.9  F (37.7  C) -- 92 (!) 31 94 % --   10/24/21 2245 -- 99.9  F (37.7  C) -- 87 -- 95 % --   10/24/21 2230 -- 99.7  F (37.6  C) -- 86 -- 95 % --   10/24/21 2215 -- 99.7  F (37.6  C) -- 86 -- 95 % --   10/24/21 2200 -- 99.7  F (37.6  C) -- 85 (!) 31 94 % --   10/24/21 2145 -- 99.5  F (37.5  C) -- 86 -- 96 % --    10/24/21 2130 -- 99.3  F (37.4  C) -- 90 -- 96 % --   10/24/21 2115 -- 99.3  F (37.4  C) -- 93 -- 96 % --   10/24/21 2100 -- 99.3  F (37.4  C) -- 92 (!) 33 96 % --   10/24/21 2045 -- 99.1  F (37.3  C) -- 96 -- 96 % --   10/24/21 2030 -- 99.1  F (37.3  C) -- 96 -- 95 % --   10/24/21 2028 -- -- -- -- -- 96 % --   10/24/21 2015 -- 99.3  F (37.4  C) -- 98 -- 97 % --   10/24/21 2000 -- 99.3  F (37.4  C) Bladder 98 27 97 % --   10/24/21 1945 -- 99.5  F (37.5  C) -- 97 -- 97 % --   10/24/21 1930 -- 99.7  F (37.6  C) -- 98 -- 97 % --   10/24/21 1915 -- 99.9  F (37.7  C) -- 99 -- 96 % --   10/24/21 1900 -- 99.9  F (37.7  C) -- 101 (!) 33 96 % --   10/24/21 1845 -- 99.9  F (37.7  C) -- 101 -- 96 % --   10/24/21 1830 -- 99.9  F (37.7  C) -- 101 -- 95 % --   10/24/21 1815 -- 99.9  F (37.7  C) -- 103 -- 95 % --   10/24/21 1800 -- 99.7  F (37.6  C) -- 100 (!) 32 97 % --   10/24/21 1745 -- 99.7  F (37.6  C) -- 101 -- 97 % --   10/24/21 1730 -- 99.7  F (37.6  C) -- 100 -- 98 % --   10/24/21 1715 -- 99.7  F (37.6  C) -- 100 -- 98 % --   10/24/21 1700 -- 99.5  F (37.5  C) -- 98 28 97 % --   10/24/21 1645 -- 99.5  F (37.5  C) -- 98 -- 97 % --   10/24/21 1630 -- 99.5  F (37.5  C) -- 96 -- 93 % --   10/24/21 1615 -- 99.3  F (37.4  C) -- 96 -- 93 % --   10/24/21 1600 -- 99.3  F (37.4  C) -- 93 (!) 32 93 % --     Ranges for vital signs over the past 24 hours:  Temp:  [99.1  F (37.3  C)-101.5  F (38.6  C)] 100.6  F (38.1  C)  Pulse:  [] 80  Resp:  [22-35] 30  BP: ()/(60-76) 100/62  MAP:  [10 mmHg-105 mmHg] 70 mmHg  Arterial Line BP: ()/(12-68) 112/50  FiO2 (%):  [50 %] 50 %  SpO2:  [91 %-99 %] 95 %  Vitals:    10/23/21 0600 10/24/21 0400 10/25/21 0200   Weight: 74.5 kg (164 lb 3.9 oz) 72.2 kg (159 lb 2.8 oz) 73.4 kg (161 lb 13.1 oz)     Intake/Output Summary (Last 24 hours) at 10/25/2021 1554  Last data filed at 10/25/2021 1500  Gross per 24 hour   Intake 1539.69 ml   Output 2124 ml   Net -584.31 ml     Physical  Examination:  GENERAL:  Intubated, sedated, WDWN, 56 year old man supine in NAD on the ventilator.  HEAD:  NCAT.  EYES:  PERRL, anicteric sclerae.  ENT:  No otorrhea.  Oral ETT.  OG tube present.  No anterior oral lesion.  NECK:  Supple.  LYMPH:  No lymphadenopathy  LUNGS:  Decreased posterior bibasilar breath sounds, anterior clear to auscultation bilaterally.  CHEST:  Wound VAC on clamshell incision.  Five chest tubes present.  CARDIOVASCULAR:  RRR, without murmur.  ABDOMEN: Bowel sounds present, soft, nontender by monitor to palpation.  :  Watson with leena urine.  EXTREMITIES:  Distally warm, no edema.  SKIN:  No acute rash or lesion.  Removed right arm PICC line site lacks inflammation.  NEUROLOGIC:  Sedated, opens eyes to sound, does not presently move to request.         Laboratory Data:     No results found for: ACD4    Inflammatory Markers    Recent Labs   Lab Test 10/14/21  0943 10/12/21  1038 09/07/21  1324 09/06/21  0633   CRP 23.0* 17.0*   < >  --    PSA  --   --   --  0.56    < > = values in this interval not displayed.     Immune Globulin Studies     Recent Labs   Lab Test 10/15/21  0907 09/07/21  1324 09/07/21  1100   * 363*  363*  --    IGM  --  51  --    IGE  --   --  83   IGA  --  103  --      Metabolic Studies       Recent Labs   Lab Test 10/25/21  1043 10/25/21  0825 10/25/21  0326 10/25/21  0326 10/25/21  0325 10/23/21  2108 10/23/21  2016 10/23/21  1553 10/23/21  1543 10/22/21  1602 10/22/21  1601 10/22/21  0959 10/22/21  0958 10/20/21  1004 10/20/21  0957 10/07/21  1811 10/07/21  1727 09/07/21  1100 09/07/21  0928   NA  --  148*  --  145*  --    < >  --   --  147*   < > 147*   < > 147*   < > 146*   < >  --    < >  --    POTASSIUM  --  3.9  --  4.3  --    < >  --   --  3.8   < > 3.6   < > 3.5   < > 4.1   < >  --    < >  --    CHLORIDE  --  113*  --  110*  --    < >  --   --  111*   < > 109   < > 106   < > 107   < >  --    < >  --    CO2  --  28  --  27  --    < >  --   --  30   < > 31    < > 34*   < > 34*   < >  --    < >  --    ANIONGAP  --  7   < > 8  --    < >  --   --  6   < > 7   < > 7   < > 5   < >  --    < >  --    BUN  --  51*   < > 57*  --    < >  --   --  63*   < > 68*   < > 62*   < > 54*   < >  --    < >  --    CR  --  0.84  --  0.93  --    < >  --   --  1.17   < > 1.47*   < > 1.54*   < > 1.60*   < >  --    < >  --    GFRESTIMATED  --  >90   < > >90  --    < >  --   --  69   < > 53*   < > 50*   < > 47*   < >  --    < >  --    * 206*   < > 219*   < >   < >  --    < > 126*   < > 148*   < > 92   < > 45*   < >  --    < >  --    A1C  --   --   --   --   --   --   --   --   --   --   --   --   --   --   --   --   --   --  6.6*   ERIK  --  8.9  --  9.0  --    < >  --   --  8.5   < > 8.2*   < > 7.9*   < > 8.0*   < >  --    < >  --    PHOS  --  2.6   < > 2.6  --    < >  --   --  4.1   < > 2.8  --   --    < >  --    < >  --    < >  --    MAG  --   --   --  1.7  --    < >  --   --  2.0   < > 2.1  --   --    < >  --    < >  --    < >  --    LACT  --   --   --   --   --   --  0.5*  --  1.1   < >  --    < >  --    < >  --    < >  --    < >  --    PCAL  --   --   --   --   --   --   --   --   --   --   --   --   --   --   --   --  <0.05   < >  --    FGTL  --   --   --   --   --   --   --   --   --   --   --   --   --   --  399  --   --   --   --    CKT  --   --   --   --   --   --   --   --   --   --  51  --  55  --   --   --   --    < >  --     < > = values in this interval not displayed.     Hepatic Studies    Recent Labs   Lab Test 10/25/21  1141 10/25/21  0825 10/25/21  0326 10/25/21  0326 10/22/21  1601 10/22/21  0958 09/30/21  1059 09/19/21  1629   BILITOTAL  --  0.3  --  0.3   < > 0.3   < >  --    DBIL  --  0.1   < > 0.1   < > 0.1   < >  --    ALKPHOS  --  174*   < > 175*   < > 156*   < >  --    PROTTOTAL  --  5.7*   < > 6.1*   < > 5.1*   < >  --    ALBUMIN  --  1.6*   < > 1.7*   < > 1.1*   < >  --    AST  --  20   < > 22   < > 27   < >  --    ALT  --  101*   < > 111*   < > 281*   < >  --     LDH  --   --   --   --   --   --   --  423*   DOREEN 29  --   --   --   --  39  --   --     < > = values in this interval not displayed.     Pancreatitis testing    Recent Labs   Lab Test 10/22/21  0407 09/07/21  1324 09/07/21  1100   AMYLASE  --   --  32   TRIG 307*   < >  --     < > = values in this interval not displayed.     Hematology Studies   Recent Labs   Lab Test 10/25/21  0326 10/24/21  0410 10/23/21  0344 10/23/21  0344 10/22/21  1407 10/22/21  0958 10/22/21  0407 10/22/21  0407   WBC 32.8* 21.4*  --  21.3*  --  20.7*   < > 18.7*   ANEU  --  19.9*  --  20.0*  --   --   --   --    ANEUTAUTO 28.0*  --   --   --   --   --   --  16.7*   ALYM  --  0.4*   < > 0.2*  --   --   --   --    ALYMPAUTO 1.2  --   --   --   --   --    < > 0.5*   DONALD  --  0.9   < > 1.1  --   --   --   --    AMONOAUTO 2.1*  --   --   --   --   --    < > 0.7   AEOS  --  0.0   < > 0.0  --   --   --   --    AEOSAUTO 0.0  --   --   --   --   --    < > 0.0   ABSBASO 0.1  --   --   --   --   --    < > 0.0   HGB 10.6* 9.8*   < > 9.6*   < > 9.1*   < > 6.6*   HCT 33.1* 30.5*   < > 30.2*   < > 28.0*   < > 21.7*    201   < > 132*   < > 112*   < > 93*    < > = values in this interval not displayed.     Clotting Studies    Recent Labs   Lab Test 10/25/21  0326 10/24/21  1626 10/24/21  0409 10/23/21  1543 10/23/21  0344 10/22/21  1407   INR 1.23* 1.19* 1.25* 1.27*   < > 1.28*   PTT  --   --   --   --   --  43*    < > = values in this interval not displayed.     Iron Testing    Recent Labs   Lab Test 10/25/21  0326 10/23/21  0344 10/22/21  0959 09/19/21  1629 09/19/21  1304 09/10/21  0616 09/09/21  0536   IRON  --   --   --   --   --   --  146   FEB  --   --   --   --   --   --  190*   IRONSAT  --   --   --   --   --   --  77*   ARMEN  --   --   --   --   --   --  1,049*   MCV 94   < >  --    < > 90   < > 87   HAPT  --   --  380*   < >  --   --   --    RETP  --   --   --   --  5.2*  --   --    RETICABSCT  --   --   --   --  0.177*  --   --     <  > = values in this interval not displayed.     Autoimmune Testing    Recent Labs   Lab Test 09/07/21  1324   RNPIGG Negative   SMIGG Negative   SSAIGG Negative   SSBIGG Negative     Arterial Blood Gas Testing    Recent Labs   Lab Test 10/25/21  1136 10/25/21  0825 10/25/21  0326 10/24/21  2355 10/24/21  2019 10/24/21  2019   PH 7.51* 7.52* 7.50* 7.52*  --  7.48*   PCO2 34* 34* 31* 33*  --  33*   PO2 87 104 110* 74*  --  101   HCO3 27 28 24 26  --  25   O2PER 50 50 50 50   < > 50    < > = values in this interval not displayed.     Thyroid Studies     Recent Labs   Lab Test 09/07/21  1100 09/06/21  0633   TSH 0.11* 0.19*   T4 0.68* 0.73*     Urine Studies     Recent Labs   Lab Test 10/22/21  1641 10/17/21  1642 10/17/21  1041 10/15/21  1127 10/07/21  1819   URINEPH 7.5* 5.0 5.0 5.5 5.5   NITRITE Negative Negative Negative Negative Negative   LEUKEST Negative Negative Trace* Negative Negative   WBCU 3 25* 44* 1 1     Medication levels    Recent Labs   Lab Test 10/25/21  0543 10/18/21  0549 10/18/21  0403   VANCOMYCIN  --   --  12.6   TACROL 11.6   < >  --     < > = values in this interval not displayed.     Microbiology:    Fungal testing  Recent Labs   Lab Test 10/20/21  0957   FGTL 399   FGTLI Positive*     Last Culture results with specimen source  Culture   Date Value Ref Range Status   10/23/2021 No growth after 2 days  Preliminary   10/23/2021 No growth after 2 days  Preliminary   10/22/2021 1+ Normal jean marie  Final   10/22/2021 3+ Candida albicans (A)  Final   10/22/2021 No growth after 2 days  Preliminary   10/22/2021 No growth after 2 days  Preliminary   10/20/2021 3+ Candida albicans (A)  Final     Comment:     Susceptibilities not routinely done   10/20/2021 Candida albicans (A)  Preliminary   10/20/2021 No Growth  Final   10/20/2021 Positive on the 3rd day of incubation (A)  Final   10/20/2021 Candida albicans (AA)  Final     Comment:     1 of 2 bottles   10/20/2021 No Growth  Final   10/20/2021 2+ Normal  jean marie  Final   10/20/2021 2+ Candida albicans (A)  Final     Comment:     Susceptibilities not routinely done   10/17/2021 1+ Normal jean marie  Final   10/17/2021 No Growth  Final   10/17/2021 No Growth  Final    No results found for: SDES     Last check of C difficile  No results found for: CDBPCT    Quantiferon testing   Recent Labs   Lab Test 10/25/21  0326 10/24/21  0410 09/16/21  0645 09/07/21  1324   TBRES  --   --   --  Indeterminate*   LYMPH 4 2   < > 2    < > = values in this interval not displayed.     Virology:    Coronavirus-19 testing    Recent Labs   Lab Test 10/24/21  1644 10/17/21  1329 10/15/21  0614 10/08/21  1414 09/15/21  1216 09/07/21  1324 09/06/21  0010 08/30/21  0752 08/02/21  1230 06/20/20  1454   CD19  --   --   --   --   --  <1*  --   --   --   --    ACD19  --   --   --   --   --  1*  --   --   --   --    INVAW54RIS Negative Negative Negative Negative   < >  --    < >  --   --   --    COVIDPCREXT  --   --   --   --   --   --   --  Not Detected Not Detected Not Detected    < > = values in this interval not displayed.     Respiratory virus (non-coronavirus-19) testing    Recent Labs   Lab Test 10/17/21  1331   IFLUA Negative   FLUAH1 Negative   [id number] Negative   FLUAH3 Negative   IFLUB Negative   PIV1 Negative   PIV2 Negative   PIV3 Negative   HRVS Negative   RSVA Negative   RSVB Negative   HMPV Negative   ADVBE Negative   ADVC Negative     CMV viral loads  No lab results found.    Hepatitis B Testing     Recent Labs   Lab Test 10/15/21  0907 09/16/21  2157 09/07/21  1324 09/07/21  1100   AUSAB 0.12  --   --  0.51   HBCAB Nonreactive  --   --  Nonreactive   HEPBANG Nonreactive Nonreactive   < >  --     < > = values in this interval not displayed.     Hepatitis C Antibody   Date Value Ref Range Status   10/15/2021 Nonreactive Nonreactive Final   09/08/2021 Nonreactive Nonreactive Final       CMV Antibody IgG   Date Value Ref Range Status   10/15/2021 No detectable antibody. No detectable  antibody.  Final   09/07/2021 No detectable antibody. No detectable antibody.  Final     Varicella Zoster Antibody IgG   Date Value Ref Range Status   09/07/2021 Positive (A) No detectable antibody.  Final     Comment:     Suggests previous exposure or immunization and probable immunity.     EBV Capsid Antibody IgG   Date Value Ref Range Status   10/15/2021 Positive (A) No detectable antibody. Final     Comment:     Suggests recent or past exposure.   09/07/2021 Positive (A) No detectable antibody. Final     Comment:     Suggests recent or past exposure.     Toxoplasma Antibody IgG   Date Value Ref Range Status   09/07/2021 <3.0 0.0 - 7.1 IU/mL Final     Comment:     Negative- Absence of detectable Toxoplasma gondii IgG antibodies. A negative result does not rule out acute infection.     Herpes Simplex Virus Type 1 IgG Antibody   Date Value Ref Range Status   10/15/2021 Positive.  IgG antibody to HSV-1 detected. (A) No HSV-1 IgG antibodies detected Final   09/07/2021 Positive.  IgG antibody to HSV-1 detected. (A) No HSV-1 IgG antibodies detected Final     Herpes Simplex Virus Type 2 IgG Antibody   Date Value Ref Range Status   10/15/2021 Positive.  IgG antibody to HSV-2 detected. (A) No HSV-2 IgG antibodies detected Final   09/07/2021 Positive.  IgG antibody to HSV-2 detected. (A) No HSV-2 IgG antibodies detected Final     Imaging:  Recent Results (from the past 48 hour(s))   MR Brain w/o & w Contrast    Narrative     MR BRAIN W/O & W CONTRAST 10/23/2021 7:33 PM    Provided History: Mental status change, unknown cause.  ICD-10: Altered mental status    Comparison: CTA head 10/22/2021.    Technique: Multiplanar T1-weighted, axial FLAIR, and susceptibility  images were obtained without intravenous contrast. Following  intravenous gadolinium-based contrast administration, axial  T2-weighted, diffusion, and T1-weighted images (in multiple planes)  were obtained.    Contrast: 6.8 mL  Gadavist    Findings:  Diffusion-weighted imaging demonstrates multiple areas of diffusion  restriction within the left occipital lobe, right occipital lobe,  bilateral frontal and parietal lobes, and left cerebellum, all of  which correlate to areas of hypoattenuation seen on prior CT  10/22/2021. These demonstrate high T2 signal on FLAIR imaging and low  ADC signal. Corresponding effacement of the cerebral sulci. Small  areas of intrinsic T1 hyperintensity within the larger areas of  infarct. Punctate regions of susceptibility effect in both cerebral  hemispheres, greatest in the region of left occipital infarct.  Postcontrast T1-weighted spin-echo images demonstrate patchy regions  of enhancement corresponding to areas of restricted diffusion.    Scattered periventricular T2 hyperintensities without associated  diffusion restriction likely the sequelae of chronic small vessel  ischemic disease. The ventricles are proportionate to the cerebral  sulci. Normal major vascular intracranial flow-voids.    Normal calvarial marrow signal. Mild paranasal sinus mucosal  thickening. Extensive bilateral mastoid effusions. The orbits are  grossly unremarkable.      Impression    Impression:  Multifocal subacute infarcts within both cerebral hemispheres and left  cerebellum all of which appear present on prior head CT 10/22/2021.  Minimal petechial hemorrhage associated with the infarct in the left  occipital lobe.    I have personally reviewed the examination and initial interpretation  and I agree with the findings.    ROSS SANTIAGO MD         SYSTEM ID:  [id number]   XR Chest Port 1 View    Narrative    XR CHEST PORT 1 VIEW  10/24/2021 10:11 AM      HISTORY: s/p lung transplant    COMPARISON: Chest x-ray 10/23/2021.    FINDINGS:   Portable AP 35 degree upright chest x-ray. Postsurgical bilateral lung  transplant changes. Clamshell sternotomy wires are intact. Skin  staples project over the lower hemithoraces. Bilateral  chest tubes and  mediastinal drain. Right upper cavity PICC tip terminates over the  right atrium.    Trachea is midline. Cardiac silhouette is obscured. Bony vasculature  is indistinct. Mild bilateral perihilar interstitial opacities. No  significant pleural effusion or pneumothorax.    Osseous structures are intact. Visualized soft tissues are normal.      Impression    IMPRESSION:   1.  Stable postsurgical bilateral lung transplant changes with mild  perihilar interstitial opacities suggestive for pulmonary edema versus  atelectasis.  2.  Support devices in stable position. No appreciable pneumothorax.    I have personally reviewed the examination and initial interpretation  and I agree with the findings.    FREDDY LEAHY MD         SYSTEM ID:  [id number]   XR Chest Port 1 View    Narrative    EXAM: XR CHEST PORT 1 VIEW  10/25/2021 1:30 AM     HISTORY:  s/p lung transplant       COMPARISON:  radiographs 10/24/2021, 10/23/2021    TECHNIQUE: Portable AP radiograph of the chest.    FINDINGS:   Endotracheal tube tip projects over the midthoracic trachea. Enteric  tube courses below the diaphragm and out of the field-of-view. Right  upper extremity PICC tip projects over the high right atrium. Stable  positioning of bilateral pleural chest tubes and mediastinal chest  tube. Postsurgical changes of bilateral lung transplantation.    The trachea is midline. Stable cardiac silhouette. Mixed interstitial  and airspace opacities laterally are not significantly changed. Trace  bilateral pleural effusions. No appreciable pneumothorax.      Impression    IMPRESSION: Status post bilateral lung transplant:  1. No significant interval change and stable support devices.  2. Small left pleural effusion. Bibasilar atelectasis. Stable  bilateral interstitial and airspace opacities.  3. No pneumothorax.    I have personally reviewed the examination and initial interpretation  and I agree with the findings.    JOSEE ROMERO MD          SYSTEM ID:  [id number]   CT Chest/Abdomen/Pelvis w Contrast   Result Value    Radiologist flags Nonocclusive venous thrombosis of the low right (Urgent)    Narrative    EXAMINATION: CT CHEST/ABDOMEN/PELVIS W CONTRAST, 10/25/2021 12:15 PM    TECHNIQUE:  Helical CT images from the lung apices through the  symphysis pubis were obtained with contrast.  Coronal and sagittal  reformatted images were generated at a workstation for further  assessment.    CONTRAST:  99 ml Isovue [id number].    COMPARISON: Chest CTA and CT abdomen and pelvis 10/22/2021, same-day  chest radiograph, right upper extremity venous ultrasound 10/19/2021    HISTORY: Sepsis    FINDINGS:  Images limited by motion artifact.    Lines and tubes: Endotracheal tube tip in the mid thoracic trachea.  Enteric tube tip in the stomach. Right upper extremity PICC tip at the  superior cavoatrial junction. Mediastinal drain tips overlying the  arch. Bilateral chest tubes in similar position with right inferior  chest tube tip in similar position in the right 8th-9th intercostal  space, not definitively in the right pleural space. Watson catheter  within the bladder.    Lungs: Postoperative changes of bilateral lung transplantation. Trace  right hydropneumothorax. No left pneumothorax. Small left pleural  effusion. Associated compressive atelectasis bilaterally. Decreased  consolidative opacities in the posterolateral left upper lobe and  bilateral lung bases. Diffuse bronchovascular thickening.  Heart and mediastinum: Trace dependent debris in the trachea. Central  tracheobronchial tree is otherwise patent. Heart size is normal. No  significant pericardial effusion. Mild coronary artery calcifications.  Thoracic vasculature: Normal caliber thoracic aorta. Normal caliber  pulmonary artery. Nonocclusive thrombosis of the low right internal  jugular vein.  Lymph nodes: No suspicious lymphadenopathy.  Thyroid: No suspicious nodules.    Liver: Unchanged subcentimeter  hypodensity in the right hepatic lobe  (series 3, image 263), too small to characterize. No suspicious  lesions. Portal veins appear patent.  Gallbladder: No cholelithiasis or evidence of acute cholecystitis. No  intra- or extra-hepatic biliary ductal dilatation.  Spleen: Unremarkable.  Pancreas: Similar density at the pancreaticoduodenal groove, otherwise  unremarkable.  Adrenal glands: No discrete nodules.  Kidneys: No hydronephrosis. No nephrolithiasis. No suspicious mass.  Bladder / Pelvic organs: Bladder is decompressed with Watson. No pelvic  mass.  Bowel: Hyperdense colonic contents. No evidence of obstruction. The  appendix is unremarkable. Colonic diverticulosis without evidence of  acute diverticulitis.  Lymph nodes: No suspicious lymphadenopathy.  Peritoneum / Retroperitoneum: No pneumoperitoneum. No organized fluid  collections.  Abdominal vasculature: Major vascular structures of the abdomen are  patent and normal in caliber.    Bones and soft tissues: Degenerative changes of the visualized spine.  No acute osseous abnormalities or suspicious bony lesions.  Subcutaneous and intramuscular emphysema along the anterior chest wall  and inferior neck. Clamshell sternotomy wires are intact. Trace  anasarca.      Impression    IMPRESSION:   1. Decreased consolidative opacities in the posterolateral left upper  lobe and bilateral lung bases, which may represent improving infection  and/or atelectasis.  2. Nonocclusive venous thrombosis of the low right internal jugular  vein.  3. Decreased trace right hydropneumothorax and small left pleural  effusion.  4. Support devices are in similar position, including the right  inferior chest tube tip near the 8th-9th intercostal space, not  definitively in the pleural space.   5. Decreased anterior chest subcutaneous/intramuscular emphysema.  6. The remainder of the exam is not significantly changed since  10/22/2021.    [Urgent Result: Nonocclusive venous thrombosis of  the low right  internal jugular vein.]    Finding was identified on 10/25/2021 12:58 PM.     Dr. Elias was contacted by Dr. Llamas at 10/25/2021 1:38 PM and  verbalized understanding of the urgent finding.     I have personally reviewed the examination and initial interpretation  and I agree with the findings.    JOSEE ROMERO MD         SYSTEM ID:  [id number]   XR Feeding Tube Placement    Narrative    Feeding tube placement: 10/25/2021 3:17 PM    Comparison: CT 10/25/2021. Radiograph 10/23/2021.    Indication: Feeding tube placement    Fluoroscopy time: 2.3 minutes    Technique: After injection of Xylocaine gel into the left nostril, a  feeding tube was advanced under fluoroscopic guidance.    Findings: The feeding tube was advanced with the tip in the third  portion of the duodenum. A small amount of barium was injected to  demonstrate placement within the small bowel. The feeding tube was  then flushed with normal saline. The feeding tube was secured using  tape on the nasal bridge and Tegaderm on the cheek. Small amount of  contrast overlying the proximal duodenum may represent a small  diverticulum containing contrast when correlated to prior CT and  radiograph.      Impression    Impression: Uncomplicated feeding tube placement with tip in the third  portion of the duodenum.    I, KAMLA SHIN MD, attest that I was immediately available to  provide guidance and assistance during the entirety of the procedure.  Radiologist was not present directly for tube placement as was  performed portable in the ICU.    I have personally reviewed the examination and initial interpretation  and I agree with the findings.    KAMLA SHIN MD         SYSTEM ID:  [id number]     10/25 ABX:  Uncomplicated feeding tube placement with tip in the third portion of the duodenum.  10/25 Chest / abdm CT:  1. Decreased (versus 10/22/21 CT scan) consolidative opacities in the posterolateral left upper lobe and bilateral lung  bases, which may represent improving infection and/or atelectasis.  2. Nonocclusive venous thrombosis of the low right internal jugular vein.  3. Decreased trace right hydropneumothorax and small left pleural effusion.  4. Support devices are in similar position, including the right inferior chest tube tip near the 8th-9th intercostal space, not definitively in the pleural space.  5. Decreased anterior chest subcutaneous/intramuscular emphysema.  6. The remainder of the exam is not significantly changed since 10/22/2021.  10/25 CXR:  1. No significant interval change and stable support devices.  2. Small left pleural effusion. Bibasilar atelectasis. Stable bilateral interstitial and airspace opacities.  3. No pneumothorax.  10/24 CXR:  1.  Stable postsurgical bilateral lung transplant changes with mild  perihilar interstitial opacities suggestive for pulmonary edema versus atelectasis.  2.  Support devices in stable position. No appreciable pneumothorax.   10/23 TTE:  No evidence of endocarditis. Normal biventricular function.  No valvular disease.  10/23 Brain MRI:  Multifocal subacute infarcts within both cerebral hemispheres and left cerebellum all of which appear present on prior head CT 10/22/2021.  Minimal petechial hemorrhage associated with the infarct in the left occipital lobe.  10/23 ABXs:  Gastric tube tip and sidehole projected over the stomach.  Nonobstructive bowel gas pattern.  10/23 TTE:  Valves cannot be assessed in this echo due to bandages on the chest.   10/23 CXR:  1. Postsurgical changes bilateral lung transplant.  2. Stable support devices.  3. Small left pleural effusion. Bibasilar atelectasis. Stable bilateral interstitial and airspace opacities.  4. No appreciable pneumothorax.  10/22 Chest / abdm CT angiogram:  1. No acute pulmonary embolus.  2. Surgical changes of bilateral lung transplantation are stable compared to exam performed earlier today. Similar appearance of opacities  throughout both lungs, likely representing atelectasis and/or consolidation.  3. No acute abnormality in the abdomen or pelvis.     10/22 Chest CT:  1. Postoperative changes of bilateral lung transplantation with supportive devices in stable positioning.  2. Multifocal consolidative opacities involving the left upper, left lower, and right lower lobes with scattered mucous plugging.  Concerning for aspiration or infection.  3. Multifocal diffuse groundglass opacities with mild interlobular septal thickening, compatible with pulmonary edema.  4. Extensive subcutaneous emphysema of the anterior chest wall and right neck.

## 2021-10-25 NOTE — PROGRESS NOTES
[facility]    Stroke Progress Note    Interval EventsEEG disconnected today.  Diffuse encephalopathy without epileptiform discharges.      HPI Summary  Edson Thornton is a 56 year old male with with Afib, HTN, RA, SALTY, and ILD s/p lung transplant 10/16/21.  Per primary team he has had significant difficulty with ventilation required paralytics and sedation, however, he has typically moved all his extremities with full strength.  It was noted on 10/21 that he was no longer moving his lower extremities.  Stroke code activated on morning of 10/22 for persistent neurologic change.  CT and Brain MRI showed subacute bilateral cortical infarcts and left cerebellar infarct.  Unchanged neurologic exam while following.     Stroke Evaluation Summarized    MRI/Head CT MRI Brain:  Impression:  Multifocal subacute infarcts within both cerebral hemispheres and left cerebellum all of which appear present on prior head CT 10/22/2021.  Minimal petechial hemorrhage associated with the infarct in the leftoccipital lobe.     Head CT: multiple scattered subacute ischemic strokes, predating exam change from 10/22.  May contribute to encephalopathy, but would not explain lack of extremity movement.    Intracranial Vasculature Head CTA demonstrates no aneurysm or stenosis of major intracranial arteries.    CT perfusion - focally decreased cerebral blood volume and cerebral blood flow in the posterior most left parieto-occipital region with corresponding slight increase in TTP possibly representing an area of hypoperfusion or core infarct.    Cervical Vasculature No stenosis of major cervical arteries     Echocardiogram TC reported no valvular vegetations and no pulmonary vein thrombosis per primary team.   EKG/Telemetry Sinus tachycardia   Otherwise normal ECG   When compared with ECG of 21-OCT-2021 06:50,   ST no longer depressed in Anterior leads   ST no longer elevated in Lateral leads     Other Testing EEG showed no epileptiform discharges.  Positive for diffuse encephalopathy.      LDL  No lab value available in past 30 days   A1C  No lab value available in past 30 days   Troponin No lab value available in past 48 hrs       Impression     #Acute to subacute ischemic stroke of bilateral cerebral hemispheres and left cerebellum with suspected cardioembolic etiology  #Encephalopathy and diffuse weakness  #Sustained ankle clonus, resolved  Pt was initially moving all extremities and having agitation requiring restraints.  On the morning of 10/22, a stroke code was called due to unresponsiveness and the patient not moving his extremities.  Head CT showed multiple ischemic infarcts in bilateral cerebral hemispheres and left cerebellum with consistent CT perfusion studies.   - Sustained clonus seen on exam on 10/22 concerning for cervical myelopathy.  Self resolved and not seen on exam on 10/23 or on subsequent exams.  CT cervical spine showed no acute fracture or traumatic subluxation.   - TC showed no valvular vegetations and no pulmonary vein thrombosis per primary team.  - EEG preliminarily showed diffuse encephalopathy, no epileptiform discharges to date.  EEG disconnected on 10/24.    - Heparin gtt for anticoagulation, per primary team.  - No need for ASA.  - MRI Brain: consistent with initial head CT with stable infarcts.   - Recommend supportive treatment.     Differential includes: cardioembolic, septic embolism, infectious encephalopathy, metabolic encephalopathy, ICU delirium, anoxic brain injury.  Presentation and clinical picture likely multifactorial.     Thank you for including neurology in the care of this patient.  Stroke service will now sign off.  Please call with additional questions or concerns relating to stroke.     This patient was staffed with the attending stroke physician, Dr. Albarran.  Please see addendum for further input.    Vincent Knox, DO  Neurology,  PGY-1  ______________________________________________________    Clinically Significant Risk Factors Present on Admission                  Medications   Scheduled Meds    acetaminophen  975 mg Oral or Feeding Tube Q8H MARKY     acetylcysteine  2 mL Nebulization 4x Daily     acyclovir  400 mg Oral or NG Tube BID     amiodarone  400 mg Oral BID     amLODIPine  10 mg Oral or Feeding Tube Daily     bumetanide  2 mg Intravenous TID     calcium carbonate 600 mg-vitamin D 400 units  1 tablet Oral or Feeding Tube BID w/meals     carvedilol  12.5 mg Oral or Feeding Tube BID     isosorbide dinitrate  40 mg Oral or Feeding Tube TID     levalbuterol  1.25 mg Nebulization 4x Daily     levothyroxine  25 mcg Oral Daily     micafungin  100 mg Intravenous Q24H     multivitamins w/minerals  15 mL Per Feeding Tube Daily     mycophenolate  1,500 mg Oral or NG Tube BID     nystatin  1,000,000 Units Swish & Swallow 4x Daily     pantoprazole (PROTONIX) IV  40 mg Intravenous BID AC     polyethylene glycol  17 g Oral or Feeding Tube Daily     potassium chloride  20 mEq Oral Daily     predniSONE  15 mg Per Feeding Tube BID     protein modular  1 packet Per Feeding Tube BID     rosuvastatin  10 mg Oral or Feeding Tube Daily     senna-docusate  1 tablet Oral or Feeding Tube BID     sodium chloride (PF)  3 mL Intracatheter Q8H     sulfamethoxazole-trimethoprim  10 mL Oral or NG Tube Daily    Or     sulfamethoxazole-trimethoprim  1 tablet Oral or NG Tube Daily       Infusion Meds    dextrose Stopped (10/24/21 1008)     dextrose       esmolol Stopped (10/24/21 1231)     fentaNYL 50 mcg/hr (10/25/21 1000)     heparin 700 Units/hr (10/25/21 1000)     insulin regular Stopped (10/24/21 0842)     niCARdipine (CARDENE) infusion ADULT/PEDS GREATER than or EQUAL to 45 kg max conc Stopped (10/25/21 0935)     propofol (DIPRIVAN) infusion 20 mcg/kg/min (10/25/21 1000)     BETA BLOCKER NOT PRESCRIBED         PRN Meds  bisacodyl, dextrose, dextrose, glucose  **OR** dextrose **OR** glucagon, diphenhydrAMINE **OR** diphenhydrAMINE, fentaNYL, hydrALAZINE, HYDROmorphone **OR** HYDROmorphone, lidocaine 4%, lidocaine (buffered or not buffered), magnesium hydroxide, methocarbamol, naloxone **OR** naloxone **OR** naloxone **OR** naloxone, ondansetron **OR** ondansetron, oxyCODONE **OR** oxyCODONE, prochlorperazine **OR** prochlorperazine, BETA BLOCKER NOT PRESCRIBED, sodium chloride (PF)       PHYSICAL EXAMINATION  Temp:  [98.4  F (36.9  C)-100.9  F (38.3  C)] 100.9  F (38.3  C)  Pulse:  [] 101  Resp:  [22-35] 35  MAP:  [10 mmHg-105 mmHg] 75 mmHg  Arterial Line BP: ()/(12-67) 110/62  FiO2 (%):  [50 %] 50 %  SpO2:  [91 %-100 %] 95 %      Mental Status:  Intubated, does not follow commands, opens eyes to loud voice and noxious but does not track or attend to examiner.  Cranial Nerves:  PERRL, does not blink to threat bilaterally, corneal reflex intact bilaterally, face appears grossly symmetric  Motor:  does not move any extremity to deep noxious stimuli, moves head slightly to noxious stimuli.   Reflexes:  Reflexes brisk globally.   Sensory:  Does not withdraw from noxious stimuli in all four extremities.   Coordination:  unable to assess  Station/Gait:  deferred    Stroke Scales    NIHSS  Interval     Interval Comments     1a. Level of Consciousness 3-->Responds only with reflex motor or autonomic effects or totally unresponsive, flaccid, and areflexic   1b. LOC Questions 2-->Answers neither question correctly   1c. LOC Commands 2-->Performs neither task correctly   2.   Best Gaze 0-->Normal   3.   Visual 2-->Complete hemianopia   4.   Facial Palsy 0-->Normal symmetrical movements   5a. Motor Arm, Left 4-->No movement   5b. Motor Arm, Right 4-->No movement   6a. Motor Leg, Left 4-->No movement   6b. Motor Leg, right 4-->No movement   7.   Limb Ataxia 0-->Absent   8.   Sensory 2-->Severe to total sensory loss, patient is not aware of being touched in the face, arm,  and leg   9.   Best Language 0-->No aphasia, normal (intubated)   10. Dysarthria (UN) Intubated or other physical barrier   11. Extinction and Inattention  0-->No abnormality   Total 27 (10/22/21 1033)       Imaging  I personally reviewed all imaging; relevant findings per HPI.     Lab Results Data   CBC  Recent Labs   Lab 10/25/21  0326 10/24/21  0410 10/23/21  0344   WBC 32.8* 21.4* 21.3*   RBC 3.53* 3.24* 3.21*   HGB 10.6* 9.8* 9.6*   HCT 33.1* 30.5* 30.2*    201 132*     Basic Metabolic Panel    Recent Labs   Lab 10/25/21  0825 10/25/21  0326 10/25/21  0325 10/24/21  2356 10/24/21  2146   * 145*  --   --  147*   POTASSIUM 3.9 4.3  --   --  3.7   CHLORIDE 113* 110*  --   --  113*   CO2 28 27  --   --  26   BUN 51* 57*  --   --  57*   CR 0.84 0.93  --   --  1.05   * 219* 180*   < > 193*   ERIK 8.9 9.0  --   --  7.9*    < > = values in this interval not displayed.     Liver Panel  Recent Labs   Lab 10/25/21  0825 10/25/21  0326 10/24/21  2146   PROTTOTAL 5.7* 6.1* 5.3*   ALBUMIN 1.6* 1.7* 1.4*   BILITOTAL 0.3 0.3 0.2   ALKPHOS 174* 175* 156*   AST 20 22 18   * 111* 109*     INR    Recent Labs   Lab Test 10/25/21  0326 10/24/21  1626 10/24/21  0409   INR 1.23* 1.19* 1.25*      Lipid Profile    Recent Labs   Lab Test 10/22/21  0407 10/21/21  0354 10/20/21  0308 10/19/21  0338 09/07/21  1324   CHOL  --   --   --   --  214*   HDL  --   --   --   --  51   LDL  --   --   --   --  130*   TRIG 307* 486* 383*   < > 364*    < > = values in this interval not displayed.     A1C    Recent Labs   Lab Test 09/07/21  0928 09/05/21  1901   A1C 6.6* 6.5*     Troponin I    Recent Labs   Lab 10/22/21  0958   TROPONIN 0.807*

## 2021-10-25 NOTE — PLAN OF CARE
Major Shift Events:  Nicardipine turned back on for sustained SBP 170s; MD aware.  RR in low 30s.  Tmax 100.6 - MD notified.    Plan: [place] placement and line holiday.  For vital signs and complete assessments, please see documentation flowsheets.

## 2021-10-25 NOTE — H&P
THORACIC SURGERY CONSULT NOTE    Consult Reason: Trach PEG    HPI: History unobtainable from patient, history from EMR.     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 Corral. We were consulted for a trach peg for nutrition    - Will schedule for trach peg later this week.    Patient and plan discussed with attending.    Thank you for the opportunity to participate in the care of this patient.    PMH:  Past Medical History:   Diagnosis Date     BRENNAN (acute kidney injury) (H) 10/17/2021     Anxiety      Depression      HLD (hyperlipidemia)      HTN (hypertension)      Hypothyroidism      ILD (interstitial lung disease) (H)      SALTY on CPAP      Oxygen dependent     BL 4L since ~6/2021     Rheumatoid arthritis (H)     signs ~5/2020, dx 5/2021     S/P lung transplant (H) 10/16/2021     Shock liver 10/17/2021     Steroid-induced hyperglycemia      Traction bronchiectasis (H)          PSH:  Past Surgical History:   Procedure Laterality Date     COLONOSCOPY W/ BIOPSIES AND POLYPECTOMY  07/21/2020     CV CORONARY ANGIOGRAM N/A 9/8/2021    Procedure: Coronary Angiogram with possible intervention;  Surgeon: Jovon Bullock MD;  Location:  [facility]     CV RIGHT HEART CATH MEASUREMENTS RECORDED N/A 9/8/2021    Procedure: Right Heart Cath;  Surgeon: Jovon Bullock MD;  Location:  [facility]     ESOPHAGOSCOPY, GASTROSCOPY, DUODENOSCOPY (EGD), COMBINED N/A 10/23/2021    Procedure: ESOPHAGOGASTRODUODENOSCOPY (EGD);  Surgeon: Miquel Pisano MD;  Location:  GI     EXTRACTION(S) DENTAL N/A 9/22/2021    Procedure: EXTRACTION tooth #19;  Surgeon: Deepak Tobin DDS;  Location: [facility] OR     HERNIA REPAIR       right acl       TRANSPLANT LUNG RECIPIENT SINGLE X2 Bilateral 10/16/2021    Procedure: TRANSPLANT, LUNG,  RECIPIENT, BILATERAL, Bronchoscopy, on-pump perfusion, bilateral clamshell sternotomy;  Surgeon: Yanick Corral MD;  Location: [facility] OR     XR ACROMIOCLAVICULAR JOINT BILATERAL           FH:  family history includes Chronic Obstructive Pulmonary Disease in his mother; Diabetes Type 1 in his mother; Emphysema in his paternal grandfather; Heart Disease in his mother; Rheumatoid Arthritis in his father.      SH:  No Tobacco use  No EtOH use      Allergies:  No Known Allergies    Home Meds:  Medications Prior to Admission   Medication Sig Dispense Refill Last Dose     acetaminophen (TYLENOL) 325 MG tablet Take 325 mg by mouth every 6 hours as needed for mild pain   8/29/2021     amLODIPine (NORVASC) 5 MG tablet Take 5 mg by mouth daily   9/5/2021 at 0813     escitalopram (LEXAPRO) 5 MG tablet Take 5 mg by mouth daily   9/4/2021 at 2006     fluticasone-vilanterol (BREO ELLIPTA) 200-25 MCG/INH inhaler Inhale 1 puff into the lungs daily   8/29/2021     insulin aspart (NOVOLOG FLEXPEN) 100 UNIT/ML pen Inject 3-11 Units Subcutaneous With meals and at bedtime.   8/29/2021     levothyroxine (SYNTHROID/LEVOTHROID) 25 MCG tablet Take 25 mcg by mouth daily   9/5/2021 at 0812     omeprazole (PRILOSEC) 40 MG DR capsule Take 40 mg by mouth 2 times daily (before meals)   9/5/2021 at 0811     potassium chloride ER (KLOR-CON M) 20 MEQ CR tablet Take 20 mEq by mouth daily   8/29/2021     sulfamethoxazole-trimethoprim (BACTRIM DS) 800-160 MG tablet Take 1 tablet by mouth Every Mon, Wed, Fri Morning    8/30/2021       ROS: unobtainable    Physical Exam:  Temp:  [98.8  F (37.1  C)-100.9  F (38.3  C)] 100.9  F (38.3  C)  Pulse:  [] 81  Resp:  [22-35] 35  MAP:  [10 mmHg-105 mmHg] 75 mmHg  Arterial Line BP: ()/(12-67) 110/62  FiO2 (%):  [50 %] 50 %  SpO2:  [91 %-100 %] 97 %    Gen: NAD, intubated and sedated  Lungs: vented  CV: regular rhythm, normal rate  Abd: soft, non distended  Ext: warm and well perfused  Neuro:  AOx3    Labs:  ABG   Recent Labs   Lab 10/25/21  0825 10/25/21  0326 10/24/21  2355 10/24/21  2019   PH 7.52* 7.50* 7.52* 7.48*   PCO2 34* 31* 33* 33*   PO2 104 110* 74* 101   HCO3 28 24 26 25     CBC  Recent Labs   Lab 10/25/21  0326 10/24/21  0410 10/23/21  0344 10/22/21  2013 10/22/21  1407 10/22/21  0958   WBC 32.8* 21.4* 21.3*  --   --  20.7*   HGB 10.6* 9.8* 9.6* 9.0*   < > 9.1*    201 132*  --   --  112*    < > = values in this interval not displayed.     BMP  Recent Labs   Lab 10/25/21  1043 10/25/21  0825 10/25/21  0326 10/25/21  0325 10/24/21  2356 10/24/21  2146 10/24/21  1756 10/24/21  1626   NA  --  148* 145*  --   --  147*  --  146*   POTASSIUM  --  3.9 4.3  --   --  3.7  --  4.1   CHLORIDE  --  113* 110*  --   --  113*  --  111*   CO2  --  28 27  --   --  26  --  27   BUN  --  51* 57*  --   --  57*  --  63*   CR  --  0.84 0.93  --   --  1.05  --  1.02   * 206* 219* 180*   < > 193*   < > 185*    < > = values in this interval not displayed.     LFT  Recent Labs   Lab 10/25/21  0825 10/25/21  0326 10/24/21  2146 10/24/21  1626 10/24/21  1004 10/24/21  0409 10/23/21  2249 10/23/21  1543   AST 20 22 18 20   < > 20   < > 22   * 111* 109* 139*   < > 153*   < > 183*   ALKPHOS 174* 175* 156* 172*   < > 160*   < > 166*   BILITOTAL 0.3 0.3 0.2 0.3   < > 0.2   < > 0.2   ALBUMIN 1.6* 1.7* 1.4* 1.5*   < > 1.4*   < > 1.4*   INR  --  1.23*  --  1.19*  --  1.25*  --  1.27*    < > = values in this interval not displayed.     PancreasNo lab results found in last 7 days.          Vincent Maravilla MD

## 2021-10-25 NOTE — PROGRESS NOTES
Pulmonary Medicine  Cystic Fibrosis - Lung Transplant Team  Progress Note  10/25/2021       Patient: Edson Thornton  MRN: [id number]  : 1965 (age 56 year old)  Transplant: 10/16/2021 (Lung), POD#9  Admission date: 2021    Assessment & Plan:     Edson Thornton is a 56 year old male with a PMH significant for NSIP/ILD, bronchiectasis, moderate PH, RA, SALTY, chronic HSV infection, hypogammaglobulinemia, steroid-induced diabetes, hypothyroidism, HTN, HLD, duodenal anomaly, anxiety, and depression.  Admitted on 21 from OSH for acute on chronic respiratory failure 2/2 ILD exacerbation.  Pt. is now s/p BSLT on 10/16/21.  Surgery relatively uncomplicated but pt. with low cardiac index and PGD immediately post-op.  Remains intubated, Caroline weaned off 10/22.  Post op course complicated by acute to subacute CVA,  encephalopathy and diffuse weakness, HTN (requiring IV agents), afib with RVR, BRENNAN (improving with aggressive diuresis), GI bleed due to NJ/OG trauma, and Candidemia with ongoing intermittent fevers.    Today's recommendations:   - Thoracic surgery consulted for trach and PEG/J tube  - Review chest tube plan with surgical team  - Agree with ongoing diuresis as tolerated  - CXR today as below  - Awaiting NJ tube replacement  - Tacrolimus level therapeutic, resume at decreased dose, continue daily levels (ordered)  - Prednisone taper due 10/31 (not yet ordered)  - Bactrim resumed today  - CMV and EBV  (not yet ordered)  - Coccidioides Ag next due  (not yet ordered)  - Pan culture (including pleural fluid) and CT scan given fevers  - Agree with line holiday as able  - Repeat IgG  (not yet ordered)  - DSA (10/23) pending although may be invalid with recent IVIG  - Donor risk labs ordered 11/15    S/p BSLT for ILD:  Acute hypoxic/hypercapneic respiratory failure:   Pulmonary edema:  Subcutaneous emphysema: Unfortunately had not received vaccination for flu, PNA, or COVID-19 PTA (not  available after admission d/t immunosuppression state).  Explant pathology with NSIP, no malignancy.  PGD 2-3.  Weaned off paralytic 10/19 (for vent dyssynchrony), pressors 10/21 (now hypertensive), and Caroline 10/22.  Initial difficulty weaning sedation given agitation then with neurological findings as below.  Initial CXR suggestive of pulmonary edema, aggressively diuresed.  Also concern for possible infection, see ID section below.  Bronch 10/20 with tan secretions to LLL, repeat 10/23 with frothy clear secretions in right TB tree.  Chest CT 10/22 with multifocal consolidative opacities involving KARI and BLL with scattered mucous plugging, multifocal diffuse GGO with mild interlobular septal thickening, and extensive subcutaneous emphysema of anterior chest wall and right neck.  CT PE negative 10/22.  Remains intubated predominantly on CMV settings (FiO2 50%), noted to PS 10/24.  - Nebs: levalbuterol and Mucomyst QID  - Aggressive pulmonary toilet with chest physiotherapy QID  - Ventilator management per ICU team, thoracic surgery consulted for trach and PEG/J tube  - Anesthesia to place erector spinae catheters prior to anticipated extubation per our routine, deferred at this time  - Review chest tube plan with surgical team  - Agree with ongoing diuresis as tolerated  - CXR today with ongoing small left pleural effusion, bibasilar atelectasis, and bilateral interstitial and airspace opacities (personally reviewed with Dr. Pereira)  - Awaiting NJ feeding tube replacement by radiology 10/25, receiving trophic TF via OG tube     Immunosuppression: s/p induction therapy with basiliximab 10/16 (and high dose IV steroid) and 10/20  - Tacrolimus held 10/23 due to supratherapeutic levels (prior dose 4 mg BID via OG tube).  Goal level 8-12.  Level 10/25 therapeutic at 11.6, resume at 3 mg BID, continue daily levels (ordered).  - MMF 1500 mg BID (on PTA, AZA to be avoided given TPMT)  - Prednisolone 15 mg BID  with next taper on 10/31 (not yet ordered) per lung transplant protocol:  Date AM dose (mg) PM dose (mg)   10/24/21 15 15   10/31/21 15 12.5   11/7/21 12.5 12.5   11/21/21 12.5 10   12/5/21 10 10   1/2/22 10 7.5   1/30/22 7.5 7.5   2/27/22 7.5 5   3/27/22 5 5   4/24/22 5 2.5      Prophylaxis:   - Bactrim for PJP ppx started 10/25 (had been held due to post-op renal dysfunction)  - Nystatin for oral candidiasis ppx, 6 month course  - See below for serologies and viral ppx:    Donor Recipient Intervention   CMV status Negative Negative None   EBV status Positive Positive N/A, EBV monthly (11/16, not yet ordered)   HSV status N/A Positive Acyclovir POD #1-30   (recent infection history)     ID: Concern for possible Strongyloides exposure pre-transplant s/p ivermectin x1 dose (9/17).  Donor and recipient cultures NGTD.  Fevers post-op, Tmax 101.7 POD #1.  S/p IV ceftazidime/vancomycin for 48h per protocol and additional empiric ceftazidime 10/19-10/23 given recurrent fevers.  Bronch cultures 10/17 with Staph epi otherwise recurrent Candida albicans (10/17, 10/20, 10/22).  Cryptococcal Ag negative 10/23.  Febrile with worsening leukocytosis again 10/25.  - Monthly Coccidioides Ag x12 months post-transplant per ID (urine and serum negative 10/17), due 11/17 (not yet ordered)  - Pan culture (including pleural fluid) and CT scan given fevers  - Low threshold for empiric ABX with decompensation      Candidemia: Noted on 1 of 2 blood cultures 10/20.  BDG fungitell positive (399) on 10/20.  Respiratory cultures with persistent Candida albicans as above.  TC 10/23 without evidence of endocarditis.   Ophthalmology consult 10/24 with benign dilated fundoscopic exam.  - Repeat blood cultures (10/22, 10/23) NGTD, (10/25, including fungal) pending  - Micafungin (10/22) per transplant ID  - Agree with line holiday as able     HSV: Chronic intermittent active infection pre-transplant with recent HSV infection: crusted lesions  throughout left side of jaw, s/p 10 day treatment course of ACV through 10/9.  HSV PCR blood negative 10/17.  - ACV ppx as above (started POD #1 instead of POD #8 given HSV history and location)     Hypogammaglobulinemia: IgG previously low at 364 (9/7).  Noted at 265 at time of transplant (10/15).  Received IVIG with premedication 10/21.    - Repeat IgG 11/17 (not yet ordered), sooner if indicated     Positive cross match: Note that he received two doses of rituximab in June, which is likely contributing to cross match result.  DSA negative 10/17.  - Repeat DSA (10/23) pending although may be invalid with recent IVIG, repeat later this week pending results (monitoring weekly x2 then q2 weeks)     PHS risk criteria donor:  Additional labs required post-transplant (between 4-8 weeks post-op): Hepatitis B, Hepatitis C, and HIV by VISHAL ([id number], ordered POD #30).     SALTY: Noted pre-transplant. Home CPAP 6-12 cmH2)  - Resume BiPAP at night post-extubation     Other relevant problems being managed by primary team:     Acute to subacute CVA:   Encephalopathy and diffuse weakness: Initially had been profoundly agitated then unable to move any extremities 10/21 evening with sedation off.  CT head 10/22 with infarcts in the bilateral cerebral hemispheres and left cerebella hemisphere, no acute intracranial hemorrhage.  MRI 10/23 with multifocal subacute infarcts within both cerebral hemispheres and left cerebellum.  Neurology consulted, see their notes for details.  DDx include surgery v embolic v infectious (see above regarding ID work up).  Heparin drip started 10/23.  EEG without seizures 10/22.  Ammonia normal 10/22, 10/25.  - Management per neurology and primary team    Afib with RVR: Noted 10/18, started on amiodarone drip and transitioned to po 10/21.  Currently in sinus rhythm.  Heparin drip started 10/23 as above.  - Management per ICU team    BRENNAN, Improving: Baseline creatinine 0.7, increased post-op.  S/p 3.5 L  "volume resuscitation the first 12 hours post-op for rising lactic acid (peak 9).  UO also downtrending.  Improving with aggressive diuresis.    Elevated LFTs, Improving: Possible shock liver post-op.  Initial ALT//230 evening of surgery, then with marked increase to 1550/1246 POD #1.  Gradual improvement in ALT/AST, alk phos with persistent mild elevation.    GI bleed, Resolved: Hemoglobin dropped to 6.6 10/22, received 2 units pRBC.  OG tube with bloody output, EGD 10/23 noted NJ/OG tube trauma with scant oozing.  IV PPI BID.  Hemoglobin now stable.    We appreciate the excellent care provided by the CVICU and CVTS teams.  Recommendations communicated via in person rounding and this note.  Will continue to follow along closely, please do not hesitate to call with any questions or concerns.    Patient seen and discussed with Dr. Pereira.    Keyla Rice PA-C  In[facility]  Pulmonary CF/Transplant     Subjective & Interval History:     Remains intubated on CMV 18/400/5/50, did PS at 10/5 for 6h yesterday.  Over breathing the vent at times.  On fentanyl, receiving prn oxycodone.  Per nursing, noted to wiggle right and left toes yesterday although not purposeful.  Received chest physiotherapy QID yesterday.  Chest tubes remain in with minimal drainage.  Febrile this morning, WBC up trending.  Net negative 689 mL with diuresis yesterday.     Review of Systems:     ROS significantly limited due to intubation and mentation.    Physical Exam:     Vital signs:  Temp: (!) 100.6  F (38.1  C) Temp src: Bladder   Pulse: 101   Resp: 30 SpO2: 95 % O2 Device: Mechanical Ventilator Oxygen Delivery: 15 LPM Height: 162.6 cm (5' 4\") Weight: 73.4 kg (161 lb 13.1 oz)  I/O:     Intake/Output Summary (Last 24 hours) at 10/25/2021 0804  Last data filed at 10/25/2021 0700  Gross per 24 hour   Intake 1692.02 ml   Output 3146 ml   Net -1453.98 ml     Constitutional: Lying in bed.  HEENT: Eyes with pink conjunctivae, anicteric.  Orally " intubated via ETT.  OG tube.  PULM: Mildly diminished air flow to bases bilaterally.  Coarse crackles t/o.  No rhonchi, no wheezes.  Mildly labored breathing on full vent support.  CV: Normal S1 and S2.  RRR.  No murmur, gallop, or rub.  No peripheral edema.   ABD: Hypoactive BS, soft, nondistended.    MSK: Not moving any extremities.  No apparent muscle wasting.   NEURO: Opens eyes to name, not following commands.  SKIN: Warm, dry.  No rash on limited exam.  Clamshell incision covered with wound vac.  PSYCH: Calm.     Lines, Drains, and Devices:  Peripheral IV 10/22/21 Left;Posterior Lower forearm (Active)   Site Assessment WDL 10/25/21 0400   Line Status Infusing 10/25/21 0400   Dressing Intervention New dressing  10/22/21 1600   Phlebitis Scale 0-->no symptoms 10/25/21 0400   Infiltration Scale 0 10/25/21 0400   Number of days: 3       PICC Triple Lumen Right  (Active)   Site Assessment WDL 10/25/21 0400   External Cath Length (cm) 0 cm 10/23/21 1600   Dressing Intervention Chlorhexidine patch;Transparent 10/25/21 0400   Dressing Change Due 10/31/21 10/24/21 2000   PICC Comment CDI 10/24/21 2000   [place] - Status infusing 10/25/21 0400   Gray - Cap Change Due 10/26/21 10/24/21 2000   Red - Status infusing 10/25/21 0400   Red - Cap Change Due 10/26/21 10/24/21 2000   White - Status infusing 10/25/21 0400   White - Cap Change Due 10/26/21 10/24/21 2000   Extravasation? No 10/24/21 2000   Line Necessity Yes, meets criteria 10/25/21 0400   Number of days:        Arterial Line 10/16/21 (Active)   Site Assessment WDL 10/25/21 0400   Line Status Pulsatile blood flow 10/25/21 0400   Arterine Line Cap Change Due 10/26/21 10/24/21 2000   Art Line Waveform Appropriate 10/25/21 0400   Art Line Interventions Leveled;Connections checked and tightened;Flushed per protocol 10/25/21 0400   Color/Movement/Sensation Capillary refill less than 3 sec 10/25/21 0400   Line Necessity Yes, meets criteria 10/25/21 0400   Dressing Type  Transparent 10/25/21 0400   Dressing Status Clean, dry, intact 10/25/21 0400   Dressing Intervention Dressing changed/new dressing 10/24/21 1600   Dressing Change Due 10/31/21 10/25/21 0400   Number of days: 9     Data:     LABS    CMP:   Recent Labs   Lab 10/25/21  0326 10/25/21  0325 10/24/21  2356 10/24/21  2146 10/24/21  1756 10/24/21  1626 10/24/21  1008 10/24/21  1004 10/24/21  0514 10/24/21  0409 10/23/21  1553 10/23/21  1543   *  --   --  147*  --  146*  --  146*   < > 145*   < > 147*   POTASSIUM 4.3  --   --  3.7  --  4.1  --  4.2   < > 4.2   < > 3.8   CHLORIDE 110*  --   --  113*  --  111*  --  112*   < > 111*   < > 111*   CO2 27  --   --  26  --  27  --  26   < > 30   < > 30   ANIONGAP 8  --   --  8  --  8  --  8   < > 4   < > 6   * 180* 200* 193*   < > 185*   < > 202*   < > 123*  106*   < > 126*   BUN 57*  --   --  57*  --  63*  --  60*   < > 61*   < > 63*   CR 0.93  --   --  1.05  --  1.02  --  1.00   < > 1.06   < > 1.17   GFRESTIMATED >90  --   --  79  --  82  --  84   < > 78   < > 69   ERIK 9.0  --   --  7.9*  --  8.7  --  8.8   < > 8.6   < > 8.5   MAG 1.7  --   --   --   --  1.7  --   --   --  1.9  --  2.0   PHOS 2.6  --   --   --   --  3.0  --   --   --  3.3  --  4.1   PROTTOTAL 6.1*  --   --  5.3*  --  5.9*  --  5.9*   < > 5.6*   < > 5.7*   ALBUMIN 1.7*  --   --  1.4*  --  1.5*  --  1.5*   < > 1.4*   < > 1.4*   BILITOTAL 0.3  --   --  0.2  --  0.3  --  0.4   < > 0.2   < > 0.2   ALKPHOS 175*  --   --  156*  --  172*  --  170*   < > 160*   < > 166*   AST 22  --   --  18  --  20  --  23   < > 20   < > 22   *  --   --  109*  --  139*  --  145*   < > 153*   < > 183*    < > = values in this interval not displayed.     CBC:   Recent Labs   Lab 10/25/21  0326 10/24/21  0410 10/23/21  0344 10/22/21  2013 10/22/21  1407 10/22/21  0958   WBC 32.8* 21.4* 21.3*  --   --  20.7*   RBC 3.53* 3.24* 3.21*  --   --  2.96*   HGB 10.6* 9.8* 9.6* 9.0*   < > 9.1*   HCT 33.1* 30.5* 30.2*  --   --   28.0*   MCV 94 94 94  --   --  95   MCH 30.0 30.2 29.9  --   --  30.7   MCHC 32.0 32.1 31.8  --   --  32.5   RDW 15.9* 16.4* 17.2*  --   --  16.1*    201 132*  --   --  112*    < > = values in this interval not displayed.       INR:   Recent Labs   Lab 10/25/21  0326 10/24/21  1626 10/24/21  0409 10/23/21  1543   INR 1.23* 1.19* 1.25* 1.27*       Glucose:   Recent Labs   Lab 10/25/21  0326 10/25/21  0325 10/24/21  2356 10/24/21  2146 10/24/21  2016 10/24/21  1756   * 180* 200* 193* 173* 169*       Blood Gas:   Recent Labs   Lab 10/25/21  0326 10/24/21  2355 10/24/21  2019 10/24/21  0010 10/23/21  2108 10/23/21  2016 10/23/21  1543 10/23/21  1215 10/23/21  1215   PHV  --   --   --   --  7.42  --  7.44*  --  7.33   PCO2V  --   --   --   --  45  --  46  --  54*   PO2V  --   --   --   --  36  --  36  --  44   HCO3V  --   --   --   --  29*  --  31*  --  28   LORI  --   --   --   --  4.1*  --  6.5*  --  1.9   O2PER 50 50 50   < > 50   < > 50  50   < > 50    < > = values in this interval not displayed.       Culture Data No results for input(s): CULT in the last 168 hours.    Virology Data:   Lab Results   Component Value Date    FLUAH1 Negative 10/17/2021    FLUAH3 Negative 10/17/2021    [id number] Negative 10/17/2021    IFLUB Negative 10/17/2021    RSVA Negative 10/17/2021    RSVB Negative 10/17/2021    PIV1 Negative 10/17/2021    PIV2 Negative 10/17/2021    PIV3 Negative 10/17/2021    HMPV Negative 10/17/2021    HRVS Negative 10/17/2021    ADVBE Negative 10/17/2021    ADVC Negative 10/17/2021       Historical CMV results (last 3 of prior testing):  No results found for: CMVQNT  No results found for: CMVLOG    Urine Studies    Recent Labs   Lab Test 10/22/21  1641 10/17/21  1642   URINEPH 7.5* 5.0   NITRITE Negative Negative   LEUKEST Negative Negative   WBCU 3 25*       Most Recent Breeze Pulmonary Function Testing (FVC/FEV1 only)  No results found for: 20002  No results found for: 20003  No results found for:    No results found for:     IMAGING    Recent Results (from the past 48 hour(s))   Echo Limited    Narrative    [id number]  [id number]  [id number]  [id number]^BLU^MECHELLE     [facility],[place]  Echocardiography Laboratory  [address] [place] [id number]     Name: SHAYNA GUERRIER  MRN: [id number]  : 1965  Study Date: 10/23/2021 08:38 AM  Age: 56 yrs  Gender: Male  Patient Location: [facility]  Reason For Study: Endocarditis  Ordering Physician: MECHELLE HURT  Referring Physician: SAUNDRA GILL  Performed By: Ayaka Tong RDCS     BSA: 1.8 m2  Height: 64 in  Weight: 164 lb  HR: 68  BP: 97/60 mmHg  ______________________________________________________________________________  Procedure  Limited Portable Echo Adult.  ______________________________________________________________________________  Interpretation Summary  Limited echo for endocarditis     Valves cannot be assessed in this echo due to bandages on the chest.     Would consider a transesophageal echocardiogram if clinically indicated.  ______________________________________________________________________________  Right Ventricle  Global right ventricular function is normal.  ______________________________________________________________________________  Report approved by: Micaela ARTEAGA 10/23/2021 10:06 AM     ______________________________________________________________________________      XR Abdomen Port 1 View    Narrative    EXAM: XR ABDOMEN PORT 1 VIEWS  10/23/2021 10:35 AM      HISTORY: eval NJ    COMPARISON: 10/22/2021    FINDINGS: Single supine AP radiograph of the abdomen. Feeding tube tip  projects over the stomach. Nonobstructive bowel gas pattern. Scattered  hypodensities project over the abdomen, likely residual intraluminal  contrast is seen on CT 10/22/2021. Patchy opacities in the lung bases.  Partial visualization of chest tubes.      Impression    IMPRESSION: Feeding tube tip projects over  the upper stomach.  Recommend advancement.    FREDDY LEAHY MD         SYSTEM ID:  [id number]   XR Abdomen 1 View    Narrative    Exam: XR ABDOMEN 1 VIEW, 10/23/2021 3:43 PM    Indication: OG tube placement    Comparison: X-ray abdomen 10/23/2021    Findings:   A single portable supine view of the abdomen is obtained. Multiple  tubes project over the abdomen and courses toward the chest. Gastric  tube tip and sidehole projected over the stomach. Nonobstructive bowel  gas pattern. Urethral temperature probe in place. Retained oral  contrast within the appendix.      Impression    Impression: Gastric tube tip and sidehole projected over the stomach.  Nonobstructive bowel gas pattern.    I have personally reviewed the examination and initial interpretation  and I agree with the findings.    FITO PERALES MD         SYSTEM ID:  [id number]   MR Brain w/o & w Contrast    Narrative     MR BRAIN W/O & W CONTRAST 10/23/2021 7:33 PM    Provided History: Mental status change, unknown cause.  ICD-10: Altered mental status    Comparison: CTA head 10/22/2021.    Technique: Multiplanar T1-weighted, axial FLAIR, and susceptibility  images were obtained without intravenous contrast. Following  intravenous gadolinium-based contrast administration, axial  T2-weighted, diffusion, and T1-weighted images (in multiple planes)  were obtained.    Contrast: 6.8 mL Gadavist    Findings:  Diffusion-weighted imaging demonstrates multiple areas of diffusion  restriction within the left occipital lobe, right occipital lobe,  bilateral frontal and parietal lobes, and left cerebellum, all of  which correlate to areas of hypoattenuation seen on prior CT  10/22/2021. These demonstrate high T2 signal on FLAIR imaging and low  ADC signal. Corresponding effacement of the cerebral sulci. Small  areas of intrinsic T1 hyperintensity within the larger areas of  infarct. Punctate regions of susceptibility effect in both cerebral  hemispheres, greatest in  the region of left occipital infarct.  Postcontrast T1-weighted spin-echo images demonstrate patchy regions  of enhancement corresponding to areas of restricted diffusion.    Scattered periventricular T2 hyperintensities without associated  diffusion restriction likely the sequelae of chronic small vessel  ischemic disease. The ventricles are proportionate to the cerebral  sulci. Normal major vascular intracranial flow-voids.    Normal calvarial marrow signal. Mild paranasal sinus mucosal  thickening. Extensive bilateral mastoid effusions. The orbits are  grossly unremarkable.      Impression    Impression:  Multifocal subacute infarcts within both cerebral hemispheres and left  cerebellum all of which appear present on prior head CT 10/22/2021.  Minimal petechial hemorrhage associated with the infarct in the left  occipital lobe.    I have personally reviewed the examination and initial interpretation  and I agree with the findings.    ROSS SANTIAGO MD         SYSTEM ID:  [id number]   XR Chest Port 1 View    Narrative    XR CHEST PORT 1 VIEW  10/24/2021 10:11 AM      HISTORY: s/p lung transplant    COMPARISON: Chest x-ray 10/23/2021.    FINDINGS:   Portable AP 35 degree upright chest x-ray. Postsurgical bilateral lung  transplant changes. Clamshell sternotomy wires are intact. Skin  staples project over the lower hemithoraces. Bilateral chest tubes and  mediastinal drain. Right upper cavity PICC tip terminates over the  right atrium.    Trachea is midline. Cardiac silhouette is obscured. Bony vasculature  is indistinct. Mild bilateral perihilar interstitial opacities. No  significant pleural effusion or pneumothorax.    Osseous structures are intact. Visualized soft tissues are normal.      Impression    IMPRESSION:   1.  Stable postsurgical bilateral lung transplant changes with mild  perihilar interstitial opacities suggestive for pulmonary edema versus  atelectasis.  2.  Support devices in stable position. No  appreciable pneumothorax.    I have personally reviewed the examination and initial interpretation  and I agree with the findings.    FREDDY LEAHY MD         SYSTEM ID:  [id number]   XR Chest Port 1 View    Narrative    EXAM: XR CHEST PORT 1 VIEW  10/25/2021 1:30 AM     HISTORY:  s/p lung transplant       COMPARISON:  radiographs 10/24/2021, 10/23/2021    TECHNIQUE: Portable AP radiograph of the chest.    FINDINGS:   Endotracheal tube tip projects over the midthoracic trachea. Enteric  tube courses below the diaphragm and out of the field-of-view. Right  upper extremity PICC tip projects over the high right atrium. Stable  positioning of bilateral pleural chest tubes and mediastinal chest  tube. Postsurgical changes of bilateral lung transplantation.    The trachea is midline. Stable cardiac silhouette. Mixed interstitial  and airspace opacities laterally are not significantly changed. Trace  bilateral pleural effusions. No appreciable pneumothorax.      Impression    IMPRESSION: Status post bilateral lung transplant:  1. No significant interval change and stable support devices.  2. Small left pleural effusion. Bibasilar atelectasis. Stable  bilateral interstitial and airspace opacities.  3. No pneumothorax.    I have personally reviewed the examination and initial interpretation  and I agree with the findings.    JOSEE ROMERO MD         SYSTEM ID:  [id number]

## 2021-10-25 NOTE — PROGRESS NOTES
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  Course c/b CVA, encephalopathy, acute respiratory failure, acute kidney injury, fungemia.    OVERNIGHT EVENTS:  Briefly off drips but required nicardipine again overnight.  Given insulin.     TODAY'S PROGRESS:   - increase oral antihypertensives  - stop nicardipine and remove picc   - diuresis with bumex to goal net neg 500-1L   - panculture; line holiday     PLAN:  Neuro/ pain/ sedation:  Acute postoperative pain  Anxiety and depression  Acute to subacute CVA, b/l cerebral hemispheres and L cerebellum  Encephalopathy and diffuse weakness  - Pain: Fentanyl gtt, prn oxy and robaxin   - Sedation: try propofol for increased comfort of breathing  - PTA escitalopram on hold   - low-intensity heparin gtt    - ammonia today  - appreciate neurology    Pulmonary care:   SALTY on home CPAP  Acute hypoxic respiratory failure  S/p b/l lung transplant 10/16/21  - Ventilator Settings:CMV- 18/400/5/50   - Levalbuterol nebs and Mucomyst  - Chest PT  - Daily CXR  - PST BID, try 7/5  - Titrate supplemental oxygen to maintain saturation above 92%.  - Pulmonary hygiene: Incentive spirometer every 15- 30 minutes when awake, flutter valve, C&DB  - 2mg bumex TID today, goal net -500-1L  - thoracic consult for trach    - culture chest tubes today      Cardiovascular:    HTN  Afib   PFO   TC 10/23 no vegetations  Cardiogenic shock - resolved, now hypertensive   - Monitor hemodynamic status.   - Esmolol, Nicardipine gtt. MAP <100, SBP <140, will discuss with surgery   - Amlodipine 10 mg daily. Hydralazine to 100 mg oral q6, isordil to 40 tid  - coreg to 12.5  BID   - prn labetalol    - Statin: rosuvastatin 10mg  - ASA: holding  - Amiodarone 400 mg BID  - low-intensity heparin gtt    GI care/ Nutrition:   Elevated LFTs- resolving  GI bleed - NG trauma per GI, resolved   - Diet: trickle feeds via stomach until NJ placed   - PPI BID  - Continue bowel regimen: miralax, senna  - Trend LFTs    Renal/ Fluid Balance/ Electrolytes:   Acute kidney injury - resolving  BL creat appears to be ~ 0.7  - bumex 2 TID for goal net negative -500-1L    - Strict I/O, daily weights  - Avoid/limit nephrotoxins as able  - Replete lytes PRN per protocol     Endocrine:    Stress induced hyperglycemia with steroid-induced component, on DM2  Hypothyroidism  RA  Preop A1c 6.6  - medium intensity sliding scale insulin   - Goal BG <180 for optimal healing  - PTA meds: Solumedrol 125 mg q6, Synthroid 25 mcg  - Received 2 doses of Rituximab 6/21  - Rheumatology consult     ID/ Antibiotics:  Immunosuppression  Stress induced leukocytosis  Fungemia, source unclear  - Micafungin (10/22-)  - Nystatin, Acyclovir, bactrim   - Tacrolimus, prednisone  - Continue to monitor fever curve, WBC and inflammatory markers as appropriate  - Follow up cultures, re-culture today (blood, pleural, UA/reflex, sputum)   - appreciate transplant ID  - take out picc and art line, line holiday; exchange metzger    - appreciate Ophthalmology   - CT chest/abd/pelvis with IV contrast      Heme:     Acute blood loss anemia  Hypogammaglobulinemia s/p IVIG  Thrombocytopenia, HIT negative - resolved   No s/sx active bleeding  - CBC in AM  - ID as above     MSK/ Skin:  Sternotomy  Surgical Incision  - Sternal precautions  - Postoperative incision management per protocol  - PT/OT/CR     Prophylaxis:    - Mechanical prophylaxis for DVT: SCDs  - Chemical DVT prophylaxis: Heparin gtt  - PPI for 30 days postoperatively     Lines/ tubes/ drains:  - Right PICC(pre-op) - remove   - Right radial A line(10/16) - remove   - ETT(10/16)  - Metzger(10/16)  - remove and replace   - Chest tubes- 4 pleural, 1 mediastinal(10/16)  - OG (10/16)   - NJ (10/18)     Disposition:  - [facility] ICU.      Patient seen, findings and plan discussed with CV ICU staff.     Altagracia Allan MD  Surgery Resident PGY-3  Pg [phone]    ====================================    SUBJECTIVE:   Still not following commands, opens eyes to name otherwise no motor.      OBJECTIVE:   1. VITAL SIGNS:   Temp:  [98.2  F (36.8  C)-100.6  F (38.1  C)] 100.6  F (38.1  C)  Pulse:  [] 100  Resp:  [21-34] 30  MAP:  [10 mmHg-105 mmHg] 80 mmHg  Arterial Line BP: ()/(12-70) 132/58  FiO2 (%):  [50 %] 50 %  SpO2:  [91 %-100 %] 96 %  Ventilation Mode: CMV/AC  (Continuous Mandatory Ventilation/ Assist Control)  FiO2 (%): 50 %  Rate Set (breaths/minute): 18 breaths/min  Tidal Volume Set (mL): 400 mL  PEEP (cm H2O): 5 cmH2O  Pressure Support (cm H2O): 10 cmH2O  Oxygen Concentration (%): 50 %  Resp: 30      2. INTAKE/ OUTPUT:   I/O last 3 completed shifts:  In: 1993.98 [I.V.:1038.98; NG/GT:535]  Out: 3411 [Urine:3340; Chest Tube:71]    3. PHYSICAL EXAMINATION:   General: intubated  Neuro: opens eyes to name, not following commands, no motor observed   Resp: overbreathing vent   CV: regular/tachy on tele  Abdomen: nondistended, soft   Incisions: c/d/i  Extremities: warm and well perfused, no edema  CTs serosanguinous     4. INVESTIGATIONS:   Arterial Blood Gases   Recent Labs   Lab 10/25/21  0326 10/24/21  2355 10/24/21  2019 10/24/21  1757   PH 7.50* 7.52* 7.48* 7.51*   PCO2 31* 33* 33* 28*   PO2 110* 74* 101 108*   HCO3 24 26 25 22     Complete Blood Count   Recent Labs   Lab 10/25/21  0326 10/24/21  0410 10/23/21  0344 10/22/21  2013 10/22/21  1407 10/22/21  0958   WBC 32.8* 21.4* 21.3*  --   --  20.7*   HGB 10.6* 9.8* 9.6* 9.0*   < > 9.1*    201 132*  --   --  112*    < > = values in this interval not displayed.     Basic Metabolic Panel  Recent Labs   Lab 10/25/21  0326 10/25/21  0325 10/24/21  3581  10/24/21  2146 10/24/21  1756 10/24/21  1626 10/24/21  1008 10/24/21  1004   *  --   --  147*  --  146*  --  146*   POTASSIUM 4.3  --   --  3.7  --  4.1  --  4.2   CHLORIDE 110*  --   --  113*  --  111*  --  112*   CO2 27  --   --  26  --  27  --  26   BUN 57*  --   --  57*  --  63*  --  60*   CR 0.93  --   --  1.05  --  1.02  --  1.00   * 180* 200* 193*   < > 185*   < > 202*    < > = values in this interval not displayed.     Liver Function Tests  Recent Labs   Lab 10/25/21  0326 10/24/21  2146 10/24/21  1626 10/24/21  1004 10/24/21  0409 10/24/21  0409 10/23/21  2249 10/23/21  1543   AST 22 18 20 23   < > 20   < > 22   * 109* 139* 145*   < > 153*   < > 183*   ALKPHOS 175* 156* 172* 170*   < > 160*   < > 166*   BILITOTAL 0.3 0.2 0.3 0.4   < > 0.2   < > 0.2   ALBUMIN 1.7* 1.4* 1.5* 1.5*   < > 1.4*   < > 1.4*   INR 1.23*  --  1.19*  --   --  1.25*  --  1.27*    < > = values in this interval not displayed.     Pancreatic Enzymes  No lab results found in last 7 days.  Coagulation Profile  Recent Labs   Lab 10/25/21  0326 10/24/21  1626 10/24/21  0409 10/23/21  1543 10/23/21  0344 10/22/21  1407   INR 1.23* 1.19* 1.25* 1.27*   < > 1.28*   PTT  --   --   --   --   --  43*    < > = values in this interval not displayed.         5. RADIOLOGY:   Recent Results (from the past 24 hour(s))   XR Chest Port 1 View    Narrative    XR CHEST PORT 1 VIEW  10/24/2021 10:11 AM      HISTORY: s/p lung transplant    COMPARISON: Chest x-ray 10/23/2021.    FINDINGS:   Portable AP 35 degree upright chest x-ray. Postsurgical bilateral lung  transplant changes. Clamshell sternotomy wires are intact. Skin  staples project over the lower hemithoraces. Bilateral chest tubes and  mediastinal drain. Right upper cavity PICC tip terminates over the  right atrium.    Trachea is midline. Cardiac silhouette is obscured. Bony vasculature  is indistinct. Mild bilateral perihilar interstitial opacities. No  significant pleural effusion  or pneumothorax.    Osseous structures are intact. Visualized soft tissues are normal.      Impression    IMPRESSION:   1.  Stable postsurgical bilateral lung transplant changes with mild  perihilar interstitial opacities suggestive for pulmonary edema versus  atelectasis.  2.  Support devices in stable position. No appreciable pneumothorax.    I have personally reviewed the examination and initial interpretation  and I agree with the findings.    FREDDY LEAHY MD         SYSTEM ID:  [id number]   XR Chest Port 1 View    Narrative    EXAM: XR CHEST PORT 1 VIEW  10/25/2021 1:30 AM     HISTORY:  s/p lung transplant       COMPARISON:  radiographs 10/24/2021, 10/23/2021    TECHNIQUE: Portable AP radiograph of the chest.    FINDINGS:   Endotracheal tube tip projects over the midthoracic trachea. Enteric  tube courses below the diaphragm and out of the field-of-view. Right  upper extremity PICC tip projects over the high right atrium. Stable  positioning of bilateral pleural chest tubes and mediastinal chest  tube. Postsurgical changes of bilateral lung transplantation.    The trachea is midline. Stable cardiac silhouette. Mixed interstitial  and airspace opacities laterally are not significantly changed. Trace  bilateral pleural effusions. No appreciable pneumothorax.      Impression    IMPRESSION: Status post bilateral lung transplant:  1. No significant interval change and stable support devices.  2. Small left pleural effusion. Bibasilar atelectasis. Stable  bilateral interstitial and airspace opacities.  3. No pneumothorax.    I have personally reviewed the examination and initial interpretation  and I agree with the findings.    JOSEE ROMERO MD         SYSTEM ID:  [id number]       =========================================

## 2021-10-26 ENCOUNTER — APPOINTMENT (OUTPATIENT)
Dept: MRI IMAGING | Facility: CLINIC | Age: 56
End: 2021-10-26
Attending: STUDENT IN AN ORGANIZED HEALTH CARE EDUCATION/TRAINING PROGRAM
Payer: COMMERCIAL

## 2021-10-26 ENCOUNTER — APPOINTMENT (OUTPATIENT)
Dept: GENERAL RADIOLOGY | Facility: CLINIC | Age: 56
End: 2021-10-26
Attending: STUDENT IN AN ORGANIZED HEALTH CARE EDUCATION/TRAINING PROGRAM
Payer: COMMERCIAL

## 2021-10-26 LAB
ALBUMIN SERPL-MCNC: 1.8 G/DL (ref 3.4–5)
ALP SERPL-CCNC: 160 U/L (ref 40–150)
ALT SERPL W P-5'-P-CCNC: 71 U/L (ref 0–70)
ANION GAP SERPL CALCULATED.3IONS-SCNC: 5 MMOL/L (ref 3–14)
ANION GAP SERPL CALCULATED.3IONS-SCNC: 6 MMOL/L (ref 3–14)
AST SERPL W P-5'-P-CCNC: 21 U/L (ref 0–45)
BASE EXCESS BLDA CALC-SCNC: 4.9 MMOL/L (ref -9–1.8)
BASOPHILS # BLD AUTO: 0.1 10E3/UL (ref 0–0.2)
BASOPHILS NFR BLD AUTO: 0 %
BILIRUB DIRECT SERPL-MCNC: 0.1 MG/DL (ref 0–0.2)
BILIRUB SERPL-MCNC: 0.3 MG/DL (ref 0.2–1.3)
BUN SERPL-MCNC: 62 MG/DL (ref 7–30)
BUN SERPL-MCNC: 63 MG/DL (ref 7–30)
CALCIUM SERPL-MCNC: 8.6 MG/DL (ref 8.5–10.1)
CALCIUM SERPL-MCNC: 8.9 MG/DL (ref 8.5–10.1)
CHLORIDE BLD-SCNC: 117 MMOL/L (ref 94–109)
CHLORIDE BLD-SCNC: 121 MMOL/L (ref 94–109)
CO2 SERPL-SCNC: 25 MMOL/L (ref 20–32)
CO2 SERPL-SCNC: 25 MMOL/L (ref 20–32)
CREAT SERPL-MCNC: 1.11 MG/DL (ref 0.66–1.25)
CREAT SERPL-MCNC: 1.16 MG/DL (ref 0.66–1.25)
DONOR IDENTIFICATION: NORMAL
DSA COMMENTS: NORMAL
DSA PRESENT: NO
DSA TEST METHOD: NORMAL
EOSINOPHIL # BLD AUTO: 0 10E3/UL (ref 0–0.7)
EOSINOPHIL NFR BLD AUTO: 0 %
ERYTHROCYTE [DISTWIDTH] IN BLOOD BY AUTOMATED COUNT: 16.2 % (ref 10–15)
GFR SERPL CREATININE-BSD FRML MDRD: 70 ML/MIN/1.73M2
GFR SERPL CREATININE-BSD FRML MDRD: 74 ML/MIN/1.73M2
GLUCOSE BLD-MCNC: 206 MG/DL (ref 70–99)
GLUCOSE BLD-MCNC: 244 MG/DL (ref 70–99)
GLUCOSE BLDC GLUCOMTR-MCNC: 201 MG/DL (ref 70–99)
GLUCOSE BLDC GLUCOMTR-MCNC: 204 MG/DL (ref 70–99)
GLUCOSE BLDC GLUCOMTR-MCNC: 206 MG/DL (ref 70–99)
GLUCOSE BLDC GLUCOMTR-MCNC: 218 MG/DL (ref 70–99)
GLUCOSE BLDC GLUCOMTR-MCNC: 265 MG/DL (ref 70–99)
GLUCOSE BLDC GLUCOMTR-MCNC: 278 MG/DL (ref 70–99)
GLUCOSE BLDC GLUCOMTR-MCNC: 303 MG/DL (ref 70–99)
HCO3 BLD-SCNC: 27 MMOL/L (ref 21–28)
HCT VFR BLD AUTO: 32 % (ref 40–53)
HGB BLD-MCNC: 9.9 G/DL (ref 13.3–17.7)
IMM GRANULOCYTES # BLD: 1.4 10E3/UL
IMM GRANULOCYTES NFR BLD: 4 %
INR PPP: 1.26 (ref 0.85–1.15)
KOH PREPARATION: NORMAL
KOH PREPARATION: NORMAL
LACTATE SERPL-SCNC: 1.3 MMOL/L (ref 0.7–2)
LYMPHOCYTES # BLD AUTO: 1.8 10E3/UL (ref 0.8–5.3)
LYMPHOCYTES NFR BLD AUTO: 5 %
MAGNESIUM SERPL-MCNC: 1.8 MG/DL (ref 1.6–2.3)
MAGNESIUM SERPL-MCNC: 1.9 MG/DL (ref 1.6–2.3)
MCH RBC QN AUTO: 30.2 PG (ref 26.5–33)
MCHC RBC AUTO-ENTMCNC: 30.9 G/DL (ref 31.5–36.5)
MCV RBC AUTO: 98 FL (ref 78–100)
MONOCYTES # BLD AUTO: 2.3 10E3/UL (ref 0–1.3)
MONOCYTES NFR BLD AUTO: 7 %
NEUTROPHILS # BLD AUTO: 30.1 10E3/UL (ref 1.6–8.3)
NEUTROPHILS NFR BLD AUTO: 84 %
NRBC # BLD AUTO: 0.1 10E3/UL
NRBC BLD AUTO-RTO: 0 /100
O2/TOTAL GAS SETTING VFR VENT: 55 %
ORGAN: NORMAL
PCO2 BLD: 30 MM HG (ref 35–45)
PH BLD: 7.56 [PH] (ref 7.35–7.45)
PHOSPHATE SERPL-MCNC: 3 MG/DL (ref 2.5–4.5)
PHOSPHATE SERPL-MCNC: 3.6 MG/DL (ref 2.5–4.5)
PLATELET # BLD AUTO: 341 10E3/UL (ref 150–450)
PO2 BLD: 92 MM HG (ref 80–105)
POTASSIUM BLD-SCNC: 4.1 MMOL/L (ref 3.4–5.3)
POTASSIUM BLD-SCNC: 5 MMOL/L (ref 3.4–5.3)
PROT SERPL-MCNC: 5.8 G/DL (ref 6.8–8.8)
RBC # BLD AUTO: 3.28 10E6/UL (ref 4.4–5.9)
SA 1 CELL: NORMAL
SA 1 TEST METHOD: NORMAL
SA 2 CELL: NORMAL
SA 2 TEST METHOD: NORMAL
SA1 HI RISK ABY: NORMAL
SA1 MOD RISK ABY: NORMAL
SA2 HI RISK ABY: NORMAL
SA2 MOD RISK ABY: NORMAL
SODIUM SERPL-SCNC: 148 MMOL/L (ref 133–144)
SODIUM SERPL-SCNC: 151 MMOL/L (ref 133–144)
TACROLIMUS BLD-MCNC: 7.3 UG/L (ref 5–15)
TME LAST DOSE: NORMAL H
TME LAST DOSE: NORMAL H
UFH PPP CHRO-ACNC: 0.21 IU/ML
UFH PPP CHRO-ACNC: <0.1 IU/ML
UNACCEPTABLE ANTIGENS: NORMAL
UNOS CPRA: 0
WBC # BLD AUTO: 35.8 10E3/UL (ref 4–11)
ZZZSA 1  COMMENTS: NORMAL
ZZZSA 2 COMMENTS: NORMAL

## 2021-10-26 PROCEDURE — 70549 MR ANGIOGRAPH NECK W/O&W/DYE: CPT

## 2021-10-26 PROCEDURE — 999N000045 HC STATISTIC DAILY SWAN MONITORING

## 2021-10-26 PROCEDURE — 88312 SPECIAL STAINS GROUP 1: CPT | Mod: 26 | Performed by: PATHOLOGY

## 2021-10-26 PROCEDURE — 71045 X-RAY EXAM CHEST 1 VIEW: CPT

## 2021-10-26 PROCEDURE — 85520 HEPARIN ASSAY: CPT | Performed by: THORACIC SURGERY (CARDIOTHORACIC VASCULAR SURGERY)

## 2021-10-26 PROCEDURE — 250N000012 HC RX MED GY IP 250 OP 636 PS 637: Performed by: NURSE PRACTITIONER

## 2021-10-26 PROCEDURE — 99233 SBSQ HOSP IP/OBS HIGH 50: CPT | Performed by: INTERNAL MEDICINE

## 2021-10-26 PROCEDURE — 88108 CYTOPATH CONCENTRATE TECH: CPT | Mod: 26 | Performed by: PATHOLOGY

## 2021-10-26 PROCEDURE — 70553 MRI BRAIN STEM W/O & W/DYE: CPT | Mod: 26 | Performed by: RADIOLOGY

## 2021-10-26 PROCEDURE — 70553 MRI BRAIN STEM W/O & W/DYE: CPT

## 2021-10-26 PROCEDURE — 84100 ASSAY OF PHOSPHORUS: CPT | Performed by: STUDENT IN AN ORGANIZED HEALTH CARE EDUCATION/TRAINING PROGRAM

## 2021-10-26 PROCEDURE — 80197 ASSAY OF TACROLIMUS: CPT | Performed by: NURSE PRACTITIONER

## 2021-10-26 PROCEDURE — 250N000012 HC RX MED GY IP 250 OP 636 PS 637: Performed by: PHYSICIAN ASSISTANT

## 2021-10-26 PROCEDURE — 82803 BLOOD GASES ANY COMBINATION: CPT | Performed by: STUDENT IN AN ORGANIZED HEALTH CARE EDUCATION/TRAINING PROGRAM

## 2021-10-26 PROCEDURE — 94640 AIRWAY INHALATION TREATMENT: CPT | Mod: 76

## 2021-10-26 PROCEDURE — 250N000009 HC RX 250: Performed by: NURSE PRACTITIONER

## 2021-10-26 PROCEDURE — 250N000013 HC RX MED GY IP 250 OP 250 PS 637: Performed by: STUDENT IN AN ORGANIZED HEALTH CARE EDUCATION/TRAINING PROGRAM

## 2021-10-26 PROCEDURE — 200N000002 HC R&B ICU UMMC

## 2021-10-26 PROCEDURE — 87102 FUNGUS ISOLATION CULTURE: CPT | Performed by: INTERNAL MEDICINE

## 2021-10-26 PROCEDURE — 94668 MNPJ CHEST WALL SBSQ: CPT

## 2021-10-26 PROCEDURE — 250N000011 HC RX IP 250 OP 636: Performed by: STUDENT IN AN ORGANIZED HEALTH CARE EDUCATION/TRAINING PROGRAM

## 2021-10-26 PROCEDURE — 80048 BASIC METABOLIC PNL TOTAL CA: CPT | Performed by: INTERNAL MEDICINE

## 2021-10-26 PROCEDURE — 94640 AIRWAY INHALATION TREATMENT: CPT

## 2021-10-26 PROCEDURE — 255N000002 HC RX 255 OP 636: Performed by: THORACIC SURGERY (CARDIOTHORACIC VASCULAR SURGERY)

## 2021-10-26 PROCEDURE — 71045 X-RAY EXAM CHEST 1 VIEW: CPT | Mod: 26 | Performed by: RADIOLOGY

## 2021-10-26 PROCEDURE — 31622 DX BRONCHOSCOPE/WASH: CPT

## 2021-10-26 PROCEDURE — 94003 VENT MGMT INPAT SUBQ DAY: CPT

## 2021-10-26 PROCEDURE — 250N000011 HC RX IP 250 OP 636: Performed by: INTERNAL MEDICINE

## 2021-10-26 PROCEDURE — 36415 COLL VENOUS BLD VENIPUNCTURE: CPT | Performed by: THORACIC SURGERY (CARDIOTHORACIC VASCULAR SURGERY)

## 2021-10-26 PROCEDURE — 999N000157 HC STATISTIC RCP TIME EA 10 MIN

## 2021-10-26 PROCEDURE — C9113 INJ PANTOPRAZOLE SODIUM, VIA: HCPCS | Performed by: ANESTHESIOLOGY

## 2021-10-26 PROCEDURE — 250N000009 HC RX 250: Performed by: STUDENT IN AN ORGANIZED HEALTH CARE EDUCATION/TRAINING PROGRAM

## 2021-10-26 PROCEDURE — 85610 PROTHROMBIN TIME: CPT | Performed by: INTERNAL MEDICINE

## 2021-10-26 PROCEDURE — 70544 MR ANGIOGRAPHY HEAD W/O DYE: CPT | Mod: 26 | Performed by: RADIOLOGY

## 2021-10-26 PROCEDURE — 31622 DX BRONCHOSCOPE/WASH: CPT | Performed by: INTERNAL MEDICINE

## 2021-10-26 PROCEDURE — 85025 COMPLETE CBC W/AUTO DIFF WBC: CPT | Performed by: NURSE PRACTITIONER

## 2021-10-26 PROCEDURE — 0BJ08ZZ INSPECTION OF TRACHEOBRONCHIAL TREE, VIA NATURAL OR ARTIFICIAL OPENING ENDOSCOPIC: ICD-10-PCS | Performed by: INTERNAL MEDICINE

## 2021-10-26 PROCEDURE — 84100 ASSAY OF PHOSPHORUS: CPT | Performed by: INTERNAL MEDICINE

## 2021-10-26 PROCEDURE — 87116 MYCOBACTERIA CULTURE: CPT | Performed by: INTERNAL MEDICINE

## 2021-10-26 PROCEDURE — 258N000003 HC RX IP 258 OP 636: Performed by: STUDENT IN AN ORGANIZED HEALTH CARE EDUCATION/TRAINING PROGRAM

## 2021-10-26 PROCEDURE — 87210 SMEAR WET MOUNT SALINE/INK: CPT | Performed by: INTERNAL MEDICINE

## 2021-10-26 PROCEDURE — 87206 SMEAR FLUORESCENT/ACID STAI: CPT | Performed by: INTERNAL MEDICINE

## 2021-10-26 PROCEDURE — 250N000013 HC RX MED GY IP 250 OP 250 PS 637: Performed by: NURSE PRACTITIONER

## 2021-10-26 PROCEDURE — 36415 COLL VENOUS BLD VENIPUNCTURE: CPT | Performed by: NURSE PRACTITIONER

## 2021-10-26 PROCEDURE — 36415 COLL VENOUS BLD VENIPUNCTURE: CPT | Performed by: STUDENT IN AN ORGANIZED HEALTH CARE EDUCATION/TRAINING PROGRAM

## 2021-10-26 PROCEDURE — 70549 MR ANGIOGRAPH NECK W/O&W/DYE: CPT | Mod: 26 | Performed by: RADIOLOGY

## 2021-10-26 PROCEDURE — 250N000013 HC RX MED GY IP 250 OP 250 PS 637: Performed by: ANESTHESIOLOGY

## 2021-10-26 PROCEDURE — 250N000013 HC RX MED GY IP 250 OP 250 PS 637

## 2021-10-26 PROCEDURE — 36600 WITHDRAWAL OF ARTERIAL BLOOD: CPT

## 2021-10-26 PROCEDURE — 99233 SBSQ HOSP IP/OBS HIGH 50: CPT | Mod: GC | Performed by: PSYCHIATRY & NEUROLOGY

## 2021-10-26 PROCEDURE — 99233 SBSQ HOSP IP/OBS HIGH 50: CPT | Mod: 24 | Performed by: INTERNAL MEDICINE

## 2021-10-26 PROCEDURE — 250N000012 HC RX MED GY IP 250 OP 636 PS 637: Performed by: STUDENT IN AN ORGANIZED HEALTH CARE EDUCATION/TRAINING PROGRAM

## 2021-10-26 PROCEDURE — A9585 GADOBUTROL INJECTION: HCPCS | Performed by: THORACIC SURGERY (CARDIOTHORACIC VASCULAR SURGERY)

## 2021-10-26 PROCEDURE — 36415 COLL VENOUS BLD VENIPUNCTURE: CPT

## 2021-10-26 PROCEDURE — 82248 BILIRUBIN DIRECT: CPT | Performed by: INTERNAL MEDICINE

## 2021-10-26 PROCEDURE — 83605 ASSAY OF LACTIC ACID: CPT | Performed by: STUDENT IN AN ORGANIZED HEALTH CARE EDUCATION/TRAINING PROGRAM

## 2021-10-26 PROCEDURE — 83735 ASSAY OF MAGNESIUM: CPT | Performed by: INTERNAL MEDICINE

## 2021-10-26 PROCEDURE — 85520 HEPARIN ASSAY: CPT | Performed by: STUDENT IN AN ORGANIZED HEALTH CARE EDUCATION/TRAINING PROGRAM

## 2021-10-26 PROCEDURE — 250N000011 HC RX IP 250 OP 636: Performed by: ANESTHESIOLOGY

## 2021-10-26 PROCEDURE — 88108 CYTOPATH CONCENTRATE TECH: CPT | Mod: TC | Performed by: INTERNAL MEDICINE

## 2021-10-26 PROCEDURE — 250N000011 HC RX IP 250 OP 636

## 2021-10-26 PROCEDURE — 83735 ASSAY OF MAGNESIUM: CPT | Performed by: STUDENT IN AN ORGANIZED HEALTH CARE EDUCATION/TRAINING PROGRAM

## 2021-10-26 PROCEDURE — 84295 ASSAY OF SERUM SODIUM: CPT

## 2021-10-26 PROCEDURE — 87070 CULTURE OTHR SPECIMN AEROBIC: CPT | Performed by: INTERNAL MEDICINE

## 2021-10-26 PROCEDURE — 99291 CRITICAL CARE FIRST HOUR: CPT | Mod: 24 | Performed by: STUDENT IN AN ORGANIZED HEALTH CARE EDUCATION/TRAINING PROGRAM

## 2021-10-26 RX ORDER — FLUCONAZOLE 200 MG/1
400 TABLET ORAL DAILY
Status: DISCONTINUED | OUTPATIENT
Start: 2021-10-26 | End: 2021-10-29

## 2021-10-26 RX ORDER — HEPARIN SODIUM 10000 [USP'U]/100ML
0-5000 INJECTION, SOLUTION INTRAVENOUS CONTINUOUS
Status: CANCELLED | OUTPATIENT
Start: 2021-10-26

## 2021-10-26 RX ORDER — DEXTROSE MONOHYDRATE 100 MG/ML
INJECTION, SOLUTION INTRAVENOUS CONTINUOUS PRN
Status: DISCONTINUED | OUTPATIENT
Start: 2021-10-26 | End: 2021-11-17

## 2021-10-26 RX ORDER — DEXTROSE MONOHYDRATE 25 G/50ML
25-50 INJECTION, SOLUTION INTRAVENOUS
Status: DISCONTINUED | OUTPATIENT
Start: 2021-10-26 | End: 2021-12-13 | Stop reason: HOSPADM

## 2021-10-26 RX ORDER — GADOBUTROL 604.72 MG/ML
10 INJECTION INTRAVENOUS ONCE
Status: COMPLETED | OUTPATIENT
Start: 2021-10-26 | End: 2021-10-26

## 2021-10-26 RX ORDER — LABETALOL HYDROCHLORIDE 5 MG/ML
20 INJECTION, SOLUTION INTRAVENOUS EVERY 6 HOURS PRN
Status: DISCONTINUED | OUTPATIENT
Start: 2021-10-26 | End: 2021-12-13 | Stop reason: HOSPADM

## 2021-10-26 RX ORDER — NICOTINE POLACRILEX 4 MG
15-30 LOZENGE BUCCAL
Status: DISCONTINUED | OUTPATIENT
Start: 2021-10-26 | End: 2021-10-26

## 2021-10-26 RX ORDER — DEXTROSE MONOHYDRATE 25 G/50ML
25-50 INJECTION, SOLUTION INTRAVENOUS
Status: DISCONTINUED | OUTPATIENT
Start: 2021-10-26 | End: 2021-10-26

## 2021-10-26 RX ORDER — NICOTINE POLACRILEX 4 MG
15-30 LOZENGE BUCCAL
Status: DISCONTINUED | OUTPATIENT
Start: 2021-10-26 | End: 2021-12-13 | Stop reason: HOSPADM

## 2021-10-26 RX ORDER — FENTANYL CITRATE 50 UG/ML
50 INJECTION, SOLUTION INTRAMUSCULAR; INTRAVENOUS EVERY 5 MIN PRN
Status: DISCONTINUED | OUTPATIENT
Start: 2021-10-26 | End: 2021-10-27

## 2021-10-26 RX ADMIN — MULTIVIT AND MINERALS-FERROUS GLUCONATE 9 MG IRON/15 ML ORAL LIQUID 15 ML: at 08:54

## 2021-10-26 RX ADMIN — PREDNISONE 15 MG: 5 TABLET ORAL at 08:54

## 2021-10-26 RX ADMIN — METHOCARBAMOL 750 MG: 750 TABLET ORAL at 13:42

## 2021-10-26 RX ADMIN — ACETYLCYSTEINE 2 ML: 200 SOLUTION ORAL; RESPIRATORY (INHALATION) at 20:54

## 2021-10-26 RX ADMIN — BUMETANIDE 2 MG: 0.25 INJECTION, SOLUTION INTRAMUSCULAR; INTRAVENOUS at 08:57

## 2021-10-26 RX ADMIN — Medication 1 PACKET: at 20:47

## 2021-10-26 RX ADMIN — DOCUSATE SODIUM 50 MG AND SENNOSIDES 8.6 MG 1 TABLET: 8.6; 5 TABLET, FILM COATED ORAL at 08:57

## 2021-10-26 RX ADMIN — NYSTATIN 1000000 UNITS: 500000 SUSPENSION ORAL at 08:54

## 2021-10-26 RX ADMIN — ISOSORBIDE DINITRATE 40 MG: 10 TABLET ORAL at 08:55

## 2021-10-26 RX ADMIN — HYDROMORPHONE HYDROCHLORIDE 0.4 MG: 0.2 INJECTION, SOLUTION INTRAMUSCULAR; INTRAVENOUS; SUBCUTANEOUS at 13:42

## 2021-10-26 RX ADMIN — LEVALBUTEROL HYDROCHLORIDE 1.25 MG: 1.25 SOLUTION RESPIRATORY (INHALATION) at 11:18

## 2021-10-26 RX ADMIN — ACETYLCYSTEINE 2 ML: 200 SOLUTION ORAL; RESPIRATORY (INHALATION) at 15:35

## 2021-10-26 RX ADMIN — BUMETANIDE 2 MG: 0.25 INJECTION, SOLUTION INTRAMUSCULAR; INTRAVENOUS at 17:14

## 2021-10-26 RX ADMIN — SULFAMETHOXAZOLE AND TRIMETHOPRIM 1 TABLET: 400; 80 TABLET ORAL at 08:55

## 2021-10-26 RX ADMIN — NYSTATIN 1000000 UNITS: 500000 SUSPENSION ORAL at 11:58

## 2021-10-26 RX ADMIN — NYSTATIN 1000000 UNITS: 500000 SUSPENSION ORAL at 20:46

## 2021-10-26 RX ADMIN — ACETYLCYSTEINE 2 ML: 200 SOLUTION ORAL; RESPIRATORY (INHALATION) at 11:18

## 2021-10-26 RX ADMIN — AMLODIPINE BESYLATE 10 MG: 10 TABLET ORAL at 08:59

## 2021-10-26 RX ADMIN — INSULIN ASPART 3 UNITS: 100 INJECTION, SOLUTION INTRAVENOUS; SUBCUTANEOUS at 13:42

## 2021-10-26 RX ADMIN — LEVOTHYROXINE SODIUM 25 MCG: 0.03 TABLET ORAL at 08:55

## 2021-10-26 RX ADMIN — ROSUVASTATIN CALCIUM 10 MG: 10 TABLET, FILM COATED ORAL at 08:54

## 2021-10-26 RX ADMIN — HYDRALAZINE HYDROCHLORIDE 100 MG: 50 TABLET, FILM COATED ORAL at 13:42

## 2021-10-26 RX ADMIN — POTASSIUM CHLORIDE 20 MEQ: 40 SOLUTION ORAL at 08:57

## 2021-10-26 RX ADMIN — LEVALBUTEROL HYDROCHLORIDE 1.25 MG: 1.25 SOLUTION RESPIRATORY (INHALATION) at 20:55

## 2021-10-26 RX ADMIN — ACETAMINOPHEN 975 MG: 325 TABLET, FILM COATED ORAL at 17:15

## 2021-10-26 RX ADMIN — LEVALBUTEROL HYDROCHLORIDE 1.25 MG: 1.25 SOLUTION RESPIRATORY (INHALATION) at 07:43

## 2021-10-26 RX ADMIN — AMIODARONE HYDROCHLORIDE 400 MG: 200 TABLET ORAL at 20:46

## 2021-10-26 RX ADMIN — Medication 1 PACKET: at 09:00

## 2021-10-26 RX ADMIN — MYCOPHENOLATE MOFETIL 1500 MG: 200 POWDER, FOR SUSPENSION ORAL at 20:46

## 2021-10-26 RX ADMIN — INSULIN ASPART 4 UNITS: 100 INJECTION, SOLUTION INTRAVENOUS; SUBCUTANEOUS at 09:31

## 2021-10-26 RX ADMIN — FLUCONAZOLE 400 MG: 200 TABLET ORAL at 13:42

## 2021-10-26 RX ADMIN — MICAFUNGIN SODIUM 100 MG: 50 INJECTION, POWDER, LYOPHILIZED, FOR SOLUTION INTRAVENOUS at 09:02

## 2021-10-26 RX ADMIN — ACETAMINOPHEN 975 MG: 325 TABLET, FILM COATED ORAL at 01:39

## 2021-10-26 RX ADMIN — HYDRALAZINE HYDROCHLORIDE 100 MG: 50 TABLET, FILM COATED ORAL at 01:39

## 2021-10-26 RX ADMIN — ACYCLOVIR 400 MG: 200 SUSPENSION ORAL at 20:47

## 2021-10-26 RX ADMIN — FENTANYL CITRATE 50 MCG: 50 INJECTION, SOLUTION INTRAMUSCULAR; INTRAVENOUS at 15:24

## 2021-10-26 RX ADMIN — CARVEDILOL 12.5 MG: 6.25 TABLET, FILM COATED ORAL at 20:46

## 2021-10-26 RX ADMIN — GADOBUTROL 7 ML: 604.72 INJECTION INTRAVENOUS at 13:14

## 2021-10-26 RX ADMIN — NYSTATIN 1000000 UNITS: 500000 SUSPENSION ORAL at 15:15

## 2021-10-26 RX ADMIN — ISOSORBIDE DINITRATE 40 MG: 10 TABLET ORAL at 11:58

## 2021-10-26 RX ADMIN — HUMAN INSULIN 3.5 UNITS/HR: 100 INJECTION, SOLUTION SUBCUTANEOUS at 21:00

## 2021-10-26 RX ADMIN — AMIODARONE HYDROCHLORIDE 400 MG: 200 TABLET ORAL at 08:56

## 2021-10-26 RX ADMIN — HYDRALAZINE HYDROCHLORIDE 100 MG: 50 TABLET, FILM COATED ORAL at 08:55

## 2021-10-26 RX ADMIN — ISOSORBIDE DINITRATE 40 MG: 10 TABLET ORAL at 17:14

## 2021-10-26 RX ADMIN — CALCIUM CARBONATE 600 MG (1,500 MG)-VITAMIN D3 400 UNIT TABLET 1 TABLET: at 08:59

## 2021-10-26 RX ADMIN — PREDNISONE 15 MG: 5 TABLET ORAL at 20:46

## 2021-10-26 RX ADMIN — LEVALBUTEROL HYDROCHLORIDE 1.25 MG: 1.25 SOLUTION RESPIRATORY (INHALATION) at 15:35

## 2021-10-26 RX ADMIN — MYCOPHENOLATE MOFETIL 1500 MG: 200 POWDER, FOR SUSPENSION ORAL at 09:02

## 2021-10-26 RX ADMIN — ACETAMINOPHEN 975 MG: 325 TABLET, FILM COATED ORAL at 09:05

## 2021-10-26 RX ADMIN — CALCIUM CARBONATE 600 MG (1,500 MG)-VITAMIN D3 400 UNIT TABLET 1 TABLET: at 17:14

## 2021-10-26 RX ADMIN — PANTOPRAZOLE SODIUM 40 MG: 40 INJECTION, POWDER, FOR SOLUTION INTRAVENOUS at 08:57

## 2021-10-26 RX ADMIN — HYDRALAZINE HYDROCHLORIDE 100 MG: 50 TABLET, FILM COATED ORAL at 20:46

## 2021-10-26 RX ADMIN — CARVEDILOL 12.5 MG: 6.25 TABLET, FILM COATED ORAL at 08:55

## 2021-10-26 RX ADMIN — ACETYLCYSTEINE 2 ML: 200 SOLUTION ORAL; RESPIRATORY (INHALATION) at 07:44

## 2021-10-26 RX ADMIN — BUMETANIDE 2 MG: 0.25 INJECTION, SOLUTION INTRAMUSCULAR; INTRAVENOUS at 11:58

## 2021-10-26 RX ADMIN — TACROLIMUS 3 MG: 5 CAPSULE ORAL at 09:02

## 2021-10-26 RX ADMIN — TACROLIMUS 1.5 MG: 5 CAPSULE ORAL at 17:17

## 2021-10-26 RX ADMIN — ACYCLOVIR 400 MG: 200 SUSPENSION ORAL at 08:59

## 2021-10-26 RX ADMIN — PANTOPRAZOLE SODIUM 40 MG: 40 INJECTION, POWDER, FOR SOLUTION INTRAVENOUS at 15:34

## 2021-10-26 ASSESSMENT — ACTIVITIES OF DAILY LIVING (ADL)
ADLS_ACUITY_SCORE: 22
ADLS_ACUITY_SCORE: 24
ADLS_ACUITY_SCORE: 22
ADLS_ACUITY_SCORE: 24
ADLS_ACUITY_SCORE: 22
ADLS_ACUITY_SCORE: 24
ADLS_ACUITY_SCORE: 22
ADLS_ACUITY_SCORE: 24
ADLS_ACUITY_SCORE: 22
ADLS_ACUITY_SCORE: 24
ADLS_ACUITY_SCORE: 22
ADLS_ACUITY_SCORE: 22

## 2021-10-26 ASSESSMENT — MIFFLIN-ST. JEOR: SCORE: 1479

## 2021-10-26 NOTE — PROGRESS NOTES
CLINICAL NUTRITION SERVICES - REASSESSMENT NOTE     Nutrition Prescription    Malnutrition Status:    Moderate malnutrition in the context of acute on chronic illness    Future Recommendations:  Order metabolic cart study once TF at goal >24 hrs.   If loose stools become more frequent, recommend banatrol (1 pkt BID, 4 g fiber/day)        EVALUATION OF THE PROGRESS TOWARD GOALS   Diet: NPO (intubated)   Nutrition Support: Osmolite 1.5 Conner @ 60ml/hr (1440ml/day) + Prosource (1 pkt BID) via NDT will provide: 2240 kcals (35 kca/kg), 112 g PRO (1.8 g/kg), 1097 ml free H20, 293 g CHO, and 0 g fiber daily. Micro/Nx: Caltrate BID, certavite.     Intake: Met ~60% needs on avg.     NEW FINDINGS   Now that post pyloric access replaced, pt receiving feeds @ 40 ml/hr and advancing to goal. Overall, received approx 1100 kcal/day (55% needs) and 55 g protein/day (65% needs) from avg TF infusion over the past 5-days. Note, feeds held intermittently for procedures vs lack of post pyloric access.    Neuro: MRI today.   GI: Radiology replaced NDT on 10/25. Stools are loose, no rectal pouch/tube yet needed. Chking for C. Diff.     Renal: Bumex. Na 148 with FWF 30 ml q4h.     MALNUTRITION  % Intake: < 75% for > 7 days (moderate)  % Weight Loss: Difficult to assess   Subcutaneous Fat Loss: Facial region: Mild, Upper arm: Mild and Lower arm: Mild  Muscle Loss: Temporal: Moderate, Upper arm (bicep, tricep): Mild, Lower arm  (forearm): Mild, Dorsal hand: Moderate, Patellar region: Mild and Posterior calf: Mild.   Fluid Accumulation/Edema: None noted  Malnutrition Diagnosis: Moderate malnutrition in the context of acute on chronic illness    Previous Goals   Total avg nutritional intake to meet a minimum of 30 kcal/kg and 1.3 g PRO/kg daily (per dosing wt 64 kg).  Evaluation: Not met consistently     Previous Nutrition Diagnosis  Inadequate oral intake  Evaluation: No change    CURRENT NUTRITION DIAGNOSIS  Inadequate protein-energy intake  related to NPO (intubated) with inadequate enteral infusions as evidenced by received approx 1100 kcal/day (55% needs) and 55 g protein/day (65% needs) from avg TF infusion over the past 5-days.      INTERVENTIONS  None additionally at this time. See future recommendations.     Goals  Total avg nutritional intake to meet a minimum of 30 kcal/kg and 1.3 g PRO/kg daily (per dosing wt 64 kg).    Monitoring/Evaluation  Progress toward goals will be monitored and evaluated per protocol.    Sona Amezquita RD, LD  [id number]  Pgr: [phone]

## 2021-10-26 NOTE — PLAN OF CARE
Major Shift Events:  Now has gag reflex and coughs when suctioned, but still unresponsive with unequal pupils.  Tmax 101.8.  BP 110s-140s/70s-90s.  Sinus rhythm-tach with HR 90s-low 100s.  Plan: Brain MRI.  For vital signs and complete assessments, please see documentation flowsheets.

## 2021-10-26 NOTE — PROGRESS NOTES
[facility]  Transplant Infectious Disease Progress Note     Patient:  Edson Thornton, Date of birth 1965, Medical record number [id number]  Date of Visit:  10/26/2021         Assessment and Recommendations:   Recommendations:  - Given the Candida albicans' sensitivity to fluconazole and normalization of his LFTs, would switch micafungin to pNJ (or IV) fluconazole 400 mg daily through at least 11/8/21 (to give a two week course for candidemia and apparent pleural fluid infection from the date of the PICC line's removal).  The Pulmonary Transplant service plans to adjust the tacrolimus accordingly.  - Would not treat the 10/25/21 sputum Gram stain Gram positive cocci at this time -- that may be normal jean marie, so await the culture result.  - Would not presently add additional empiric antimicrobial drugs.  - Check a procalcitonin.    Transplant ID will follow with you.    Prem Soares MD  Pager [phone]    Assessment:  A 56 year old gentleman immunosuppressed (tacrolimus, mycophenolate, prednisone) s/p a 10/16/21 bilateral lung transplant for NSIP / ILD with rheumatoid arthritis who also has a history of bronchiectasis, moderate PH, SALTY, chronic HSV infection, hypogammaglobulinemia, steroid-induced diabetes, hypothyroidism, hypertension, hyperlipidemia, duodenal anomaly, anxiety, and depression.  He was admitted to [facility] on 9/5/21 for acute on chronic respiratory failure due to an ILD exacerbation and underwent lung transplant on 10/16/21.  He had fevers on 10/19/21 and on 10/20/21 and a corresponding 10/20/21 blood culture grew pan-susceptible Candida albicans.  He is stable but persistent intubated (on low vent settings) with a multifactorial encephalopathy.    ID issues:    - Candidemia in a single 10/20/21 blood culture:  Although only one blood culture (out of six post-transplant to that point) grew Candida, that positive blood culture did correspond to a significant fever  spike on 10/20/21 late PM and he had a strongly positive 10/20/21 Fungitell assay, so this very possibly represented a true candidemia and needs to be treated as such.  He is at risk for candidemia due to the presence of multiple lines.  There is no visible site of organ involvement or of hepatosplenic candidiasis on the 10/22/21 and 10/25/21 chest / abdomen CT scans (except perhaps the left pleural effusion).  A 10/23/21 TTE showed no evidence of valvular vegetations.   A 10/24/21 Ophthalmology Consult examination was benign and 10/22/21 x 2, 10/23/21 x 2, and 10/25/21 x 3 surveillance blood cultures are all without growth to date.  Empiric therapy with IV micafungin at Candida treatment dose 100 mg daily has been appropriate to give a two week course through 11/8/21 from the date of the removal of the PICC line on 10/25/21 (with other lines removed previously), but since the C albicans is quite susceptible to fluconazole (TESSIE 0.5) and his LFTs have normalized, finishing out the treatment course with fluconazole on 10/26/21 would be more elegant -- Pulmonary Transplant Consult service agrees and will adjust the tacrolimus dosing (as needed) accordingly.  If surveillance blood cultures are still without growth, a new central line can be placed after a 48 hour line holiday, on 10/27/21.    - Isolation of yeast from 10/25/21 left pleural fluid:  We await the associated culture, fluid cell count (if sent), and cytology, but the candidemia or the transplant itself may possibly have seeded the left pleural space, in which case a longer course of antifungal therapy may be necessary instead of a straight two weeks.    - New, acute, cryptogenic 10/25/21 leukocytosis / fever:  Etiology is unclear -- perhaps due to a fungal empyema.  This fever is unlikely due to the candidemia itself, since his blood cultures cleared quickly as of 10/22/21.  Would perform a basic fever work-up.  Would not add additional antimicrobials as yet  unless a clear new fever source or pathogen is identified (in the absence of fulminant sepsis).    - Sputum Gram stain from 10/25/21 showing Gram positive cocci:  Gram positive cocci in sputum may very well be normal jean marie (coagualse negative Staph) in the absence of other new indicators of a new pneumonia, so would not treat this while awaiting the culture result.  The 10/26/21 chest x-ray shows no new consolidations consistent with pneumonia (and actually seems to be improving).    Old ID issues:  - Concern for possible Strongyloides exposure (because a [place]  and in multiple countries with endemic Strongyloides) pre-transplant so received ivermectin dose on 9/17/21.  - Plan for monthly Coccidioides Ag x12 months post-transplant per ID (urine and serum negative 10/17), next due 11/17 (not yet ordered).  Has calcified granulomas on chest CT.  Has a history of residing in a [place]occid[place] endemic areas [place] and/or Histoplasma endemic area ([place]   - Pre-transplant indeterminate Quantiferon assay:  Assessed 9/16/21 by Transplant ID.  Had been in the [employer] and was stationed in many states including [facility] and [facility]. Also was stationed in [facility] and [facility] and maybe [place].  He was tested with tuberculin skin test on numerous occasions before he became immunocompromised and was never positive. In addition, he was tested with QuantIFERON on 5/11/2021 prior to, or within 24 hr of receiving 10 mg/day of prednisone, the QuantiFERON was negative.  Deemed to no have LTBI.  - Pneumonia treated with a short course of antibiotics in [place].      Other ID issues:  - QTc interval:  466 msec on 10/22/21 EKG.  - Bacterial prophylaxis:  None indicated, has been on post-transplant ceftazidime.  - Pneumocystis prophylaxis:  On TMP-SMX prior to transplant, now held for BRENNAN.  - Viral serostatus & prophylaxis:  CMV negative.  Chronic history of intermittent active HSV oral outbreaks, on acyclovir.  EBV /  VZV seropositive.  - Fungal prophylaxis:  On treatment micafungin.  - Immunization status:  Not presently relevant.  Did not receive influenza, pneumococcal, or Covid-19 vaccines pre-transplant.  - Gamma globulin status:  History of hypogammaglobulinemia.  Received IVIG on 10/21/21.  - Isolation status: Routine.        Interval History:   Mr. Thornton has now been febrile (T max 101.8 degrees F) for more than 24 hours with a persistent leukocytosis rising to 32.8 10/25/21 AM and 35.8 this PM (versus 20 - 23K the prior three days).  He remains on micafungin (since 10/22/21, for candidemia) plus TMP-SMX (resumed 10/25/21) and acyclovir prophylaxis (since 10/16/21 transplant), but completed a one week (10/16 - 23/21) post-operative empiric ceftazidime course.  He remains intubated and mostly non-interactive on the ventilator at medium settings (FiO2 0.55, PEEP 5).  Worsened unresponsiveness to stimulation, unequal pupils, and (transient) loss of gag to suctioning was noted overnight, so a repeat head CT was done this AM which showed the known old CVA sites without any obvious new lesion.  He now gags / coughs to deep suctioning, but is otherwise still non-responsive.  Neurology Consult detected no change in his neurological exam.  A follow-up brain MRI is planned.  A tracheostomy and PEG tube placement are still being considered.  He is being diuresed although his weight remains ~ stable.  Chest tubes were removed yesterday.  The right arm PICC was removed 10/25/21 PM and a central line holiday is in progress.  A new 10/25/21 left pleural fluid culture has grown 2+ C albicans.  The 10/20/21 blood culture and 10/20/21 and 10/22/21 ETT sputum cultures all grew Candida albicans, with the blood culture isolate pan-susceptible (micafungin TESSIE <0.03, fluconazole TESSIE 0.5).  The other 10/20/21 blood culture as well as 10/22/21 x 2, 10/23/21 x 2, and 10/25/21 x 3 surveillance blood cultures all are without growth to date.  The  10/23/21 serum cryptococcal antigen assay was negative.  10/22/21 and 10/25/21 abdominal CT scans showed no evidence of hepatosplenic candidiasis or other intrabdominal infection.  The 10/25/21 chest CT and 10/26/21 chest x-ray show improving bilateral infiltrates.  10/23 TTE showed no vegetations.  A 10/24/21 Ophthalmology Consult examination was benign.        History of Infectious Disease Illness (copied from the 10/23/21 Transplant ID Consult Note):   A 56 year old gentleman immunosuppressed (tacrolimus, mycophenolate, prednisone) s/p a 10/16/21 bilateral lung transplant for NSIP / ILD with rheumatoid arthritis who also has a history of bronchiectasis, moderate PH, SALTY, chronic HSV infection, hypogammaglobulinemia, steroid-induced diabetes, hypothyroidism, hypertension, hyperlipidemia, duodenal anomaly, anxiety, and depression.  He was admitted to [facility] on 9/5/21 for acute on chronic respiratory failure due to an ILD exacerbation and underwent lung transplant on 10/16/21.  The transplant surgery was complicated by early low cardiac index and immediate post-operative PGD as well as later atrial fibrillation with RVR on 10/18/21 as well as BRENNAN.  He is now day 7 post transplant and remains intubated in the CVICU on inhaled nitrous oxide with compromised capacity to wean off sedation.  He had immediate post-operative fevers for ~ 24 hours, then was afebrile for ~ 1.5 days, then respiked fevers up to 101.3 degrees F on 10/19/21 late PM and 10/20/21 late PM.  He had another low grade fever to 100.9 degrees on 10/22/21 PM.  He has been on empiric ceftazidime since transplant, as well as acyclovir prophylaxis (and was on TMP-SMX prophylaxis prior to transplant).  His post-operative peripheral WBC peaked at 44.5 on 10/18/21, fell to 27.4 by 10/20/21, and is now at 21.3.  Blood cultures x 4 obtained on 10/17/21 were negative, but one of two blood cultures peripherally drawn on 10/20/21 early AM (with the fever spike) grew  yeast (ID pending) on 10/22/21 at 52 hours of incubation; the other peripherally drawn blood culture lacks growth to date.  Surveillance blood cultures x 2 from 10/22/21 afternoon are without growth to date.  A Fungitell assay was positive at 399 on 10/20/21.  Of note, respiratory (broncial washing, ETT sputum) cultures obtained 10/17/21 and 10/20/21 have grown Candida albicans along with Staph epidermidis and other normal jean marie.  IV micafungin 100 mg daily was instituted on 10/22/21 late AM.  A 10/22/21 chest / abdominal CT scan did not reveal any likely source.  He has a history of potential Coccidioides exposure, but a 10/17/21 Coccidioides antibody assay was negative and a Coccidioides antigen assay is pending.  This morning he had some blood in his OG tube and underwent upper endoscopy.  He remains intubated but is opening his eyes to auditory stimulation and tracking today, although not moving to request.  He is hemodynamically stable.    Exposure History:  Had been in the [employer] and was stationed in many states including [facility] and [facility]. Also was stationed in [facility] and [facility] and maybe [place].  Has a history of residing in a [place]idioides endemic areas [place] and/or Histoplasma endemic area ([place]  Extensive travel as a  within the [place].      Transplants:  10/16/2021 (Lung), Postoperative day:  10.  Coordinator Kassandra Estrada    Review of Systems:  ROS is unobtainable because he is intubated, sedated, and unresponsive on the ventilator.    Past Medical History:   Diagnosis Date     BRENNAN (acute kidney injury) (H) 10/17/2021     Anxiety      Depression      HLD (hyperlipidemia)      HTN (hypertension)      Hypothyroidism      ILD (interstitial lung disease) (H)      SALTY on CPAP      Oxygen dependent     BL 4L since ~6/2021     Rheumatoid arthritis (H)     signs ~5/2020, dx 5/2021     S/P lung transplant (H) 10/16/2021     Shock liver 10/17/2021     Steroid-induced hyperglycemia       Traction bronchiectasis (H)      Past Surgical History:   Procedure Laterality Date     COLONOSCOPY W/ BIOPSIES AND POLYPECTOMY  07/21/2020     CV CORONARY ANGIOGRAM N/A 9/8/2021    Procedure: Coronary Angiogram with possible intervention;  Surgeon: Jovon Bullock MD;  Location:  HEART CARDIAC CATH LAB     CV RIGHT HEART CATH MEASUREMENTS RECORDED N/A 9/8/2021    Procedure: Right Heart Cath;  Surgeon: Jovon Bullock MD;  Location:  HEART CARDIAC CATH LAB     ESOPHAGOSCOPY, GASTROSCOPY, DUODENOSCOPY (EGD), COMBINED N/A 10/23/2021    Procedure: ESOPHAGOGASTRODUODENOSCOPY (EGD);  Surgeon: Miquel Pisano MD;  Location:  GI     EXTRACTION(S) DENTAL N/A 9/22/2021    Procedure: EXTRACTION tooth #19;  Surgeon: Deepak Tobin DDS;  Location:  OR     HERNIA REPAIR       right acl       TRANSPLANT LUNG RECIPIENT SINGLE X2 Bilateral 10/16/2021    Procedure: TRANSPLANT, LUNG, RECIPIENT, BILATERAL, Bronchoscopy, on-pump perfusion, bilateral clamshell sternotomy;  Surgeon: Yanick Corral MD;  Location:  OR     XR ACROMIOCLAVICULAR JOINT BILATERAL       Family History   Problem Relation Age of Onset     Diabetes Type 1 Mother      Heart Disease Mother      Chronic Obstructive Pulmonary Disease Mother      Rheumatoid Arthritis Father      Emphysema Paternal Grandfather      Social History     Tobacco Use     Smoking status: Never Smoker     Smokeless tobacco: Never Used   Substance Use Topics     Alcohol use: Never     Drug use: Never       There is no immunization history on file for this patient.    Patient Active Problem List   Diagnosis     S/P lung transplant (H)     BRENNAN (acute kidney injury) (H)     Shock liver          Current Medications & Allergies:       acetaminophen  975 mg Oral or Feeding Tube Q8H MARKY     acetylcysteine  2 mL Nebulization 4x Daily     acyclovir  400 mg Oral or NG Tube BID     amiodarone  400 mg Oral BID     amLODIPine  10 mg Oral or Feeding Tube Daily      bumetanide  2 mg Intravenous TID     calcium carbonate 600 mg-vitamin D 400 units  1 tablet Oral or Feeding Tube BID w/meals     carvedilol  12.5 mg Oral or Feeding Tube BID     gadobutrol  10 mL Intravenous Once     hydrALAZINE  100 mg Oral or Feeding Tube Q6H     insulin aspart  1-12 Units Subcutaneous Q4H     isosorbide dinitrate  40 mg Oral or Feeding Tube TID     levalbuterol  1.25 mg Nebulization 4x Daily     levothyroxine  25 mcg Oral Daily     micafungin  100 mg Intravenous Q24H     multivitamins w/minerals  15 mL Per Feeding Tube Daily     mycophenolate  1,500 mg Oral or NG Tube BID     nystatin  1,000,000 Units Swish & Swallow 4x Daily     pantoprazole (PROTONIX) IV  40 mg Intravenous BID AC     polyethylene glycol  17 g Oral or Feeding Tube Daily     potassium chloride  20 mEq Oral Daily     [START ON 10/31/2021] predniSONE  15 mg Oral QAM    And     [START ON 10/31/2021] predniSONE  12.5 mg Oral QPM     predniSONE  15 mg Per Feeding Tube BID     protein modular  1 packet Per Feeding Tube BID     rosuvastatin  10 mg Oral or Feeding Tube Daily     senna-docusate  1 tablet Oral or Feeding Tube BID     sodium chloride (PF)  3 mL Intracatheter Q8H     sulfamethoxazole-trimethoprim  10 mL Oral or NG Tube Daily    Or     sulfamethoxazole-trimethoprim  1 tablet Oral or NG Tube Daily     tacrolimus  3 mg Per OG Tube BID IS     Infusions/Drips:      dextrose Stopped (10/24/21 1008)     fentaNYL Stopped (10/25/21 2035)     [Held by provider] heparin Stopped (10/25/21 2129)     propofol (DIPRIVAN) infusion Stopped (10/25/21 1958)     BETA BLOCKER NOT PRESCRIBED       No Known Allergies         Physical Exam:     Patient Vitals for the past 24 hrs:   BP Temp Temp src Pulse Resp SpO2 Weight   10/26/21 1118 -- -- -- -- -- 98 % --   10/26/21 1100 116/68 100.4  F (38  C) -- 85 25 95 % --   10/26/21 1000 91/56 100  F (37.8  C) -- 78 (!) 34 95 % --   10/26/21 0900 (!) 151/90 99.5  F (37.5  C) -- 92 25 99 % --    10/26/21 0800 (!) 144/91 99.7  F (37.6  C) Bladder 88 22 99 % --   10/26/21 0744 -- -- -- -- -- 98 % --   10/26/21 0700 138/80 100.4  F (38  C) -- 95 30 97 % --   10/26/21 0630 115/75 (!) 100.6  F (38.1  C) -- 92 29 97 % --   10/26/21 0600 128/75 (!) 101.1  F (38.4  C) -- 96 30 97 % --   10/26/21 0530 125/80 (!) 101.3  F (38.5  C) -- 98 (!) 31 96 % --   10/26/21 0500 137/86 (!) 101.5  F (38.6  C) -- 98 28 96 % --   10/26/21 0430 123/75 (!) 101.7  F (38.7  C) -- 102 (!) 35 97 % --   10/26/21 0400 131/77 (!) 101.7  F (38.7  C) Bladder 100 (!) 36 96 % --   10/26/21 0330 120/73 (!) 101.7  F (38.7  C) -- 101 (!) 37 96 % --   10/26/21 0317 -- -- -- -- (!) 37 -- --   10/26/21 0300 131/73 (!) 101.7  F (38.7  C) -- 101 (!) 39 96 % --   10/26/21 0230 132/73 (!) 101.5  F (38.6  C) -- 101 (!) 38 95 % 73.8 kg (162 lb 11.2 oz)   10/26/21 0200 (!) 140/79 (!) 101.3  F (38.5  C) -- 99 29 96 % --   10/26/21 0130 (!) 145/90 (!) 101.3  F (38.5  C) -- 93 30 97 % --   10/26/21 0100 135/86 (!) 101.3  F (38.5  C) -- 96 30 96 % --   10/26/21 0030 137/85 (!) 101.3  F (38.5  C) -- 95 (!) 38 95 % --   10/26/21 0000 132/80 (!) 101.1  F (38.4  C) Bladder 94 (!) 31 95 % --   10/25/21 2345 -- (!) 101.1  F (38.4  C) -- 96 (!) 35 95 % --   10/25/21 2330 119/75 (!) 101.1  F (38.4  C) -- 93 (!) 31 96 % --   10/25/21 2315 -- (!) 101.1  F (38.4  C) -- 93 (!) 33 94 % --   10/25/21 2300 118/74 (!) 101.1  F (38.4  C) -- 89 (!) 35 93 % --   10/25/21 2245 -- (!) 101.1  F (38.4  C) -- 90 (!) 31 93 % --   10/25/21 2230 115/72 (!) 101.1  F (38.4  C) -- 88 30 93 % --   10/25/21 2215 -- (!) 101.1  F (38.4  C) -- 90 (!) 31 93 % --   10/25/21 2200 118/74 -- -- 87 -- -- --   10/25/21 2130 118/73 (!) 101.5  F (38.6  C) -- 80 28 95 % --   10/25/21 2115 -- (!) 101.5  F (38.6  C) -- 82 30 95 % --   10/25/21 2100 122/66 (!) 101.3  F (38.5  C) -- 81 (!) 32 95 % --   10/25/21 2045 -- (!) 101.1  F (38.4  C) -- 80 30 96 % --   10/25/21 2030 -- (!) 100.9  F (38.3  C) -- 84  (!) 32 96 % --   10/25/21 2015 -- (!) 100.8  F (38.2  C) -- 89 22 96 % --   10/25/21 2000 107/64 (!) 100.8  F (38.2  C) Bladder 93 27 94 % --   10/25/21 1945 102/57 (!) 100.8  F (38.2  C) -- 92 (!) 34 94 % --   10/25/21 1930 -- (!) 100.8  F (38.2  C) -- 90 30 93 % --   10/25/21 1915 -- (!) 100.6  F (38.1  C) -- 86 30 92 % --   10/25/21 1900 (!) 89/54 100.4  F (38  C) -- 83 30 95 % --   10/25/21 1800 128/75 (!) 100.9  F (38.3  C) -- 89 29 94 % --   10/25/21 1700 136/79 (!) 100.8  F (38.2  C) -- 92 30 95 % --   10/25/21 1626 -- -- -- 90 -- 95 % --   10/25/21 1600 125/85 100.4  F (38  C) Bladder 81 26 95 % --   10/25/21 1500 105/71 -- -- 80 30 96 % --   10/25/21 1430 100/62 (!) 100.6  F (38.1  C) -- 80 -- 95 % --   10/25/21 1400 95/76 (!) 100.6  F (38.1  C) -- 77 29 95 % --   10/25/21 1330 106/65 (!) 100.8  F (38.2  C) -- 80 -- 93 % --   10/25/21 1300 91/60 (!) 100.8  F (38.2  C) -- 81 30 97 % --   10/25/21 1230 108/61 (!) 100.8  F (38.2  C) Bladder 87 30 97 % --     Ranges for vital signs over the past 24 hours:  Temp:  [99.5  F (37.5  C)-101.7  F (38.7  C)] 100.4  F (38  C)  Pulse:  [] 85  Resp:  [22-39] 25  BP: ()/(54-91) 116/68  MAP:  [70 mmHg] 70 mmHg  Arterial Line BP: (112)/(50) 112/50  FiO2 (%):  [50 %-55 %] 55 %  SpO2:  [92 %-99 %] 98 %  Vitals:    10/24/21 0400 10/25/21 0200 10/26/21 0230   Weight: 72.2 kg (159 lb 2.8 oz) 73.4 kg (161 lb 13.1 oz) 73.8 kg (162 lb 11.2 oz)     Intake/Output Summary (Last 24 hours) at 10/26/2021 1201  Last data filed at 10/26/2021 1200  Gross per 24 hour   Intake 1499.09 ml   Output 1848 ml   Net -348.91 ml     Physical Examination:  GENERAL:  Intubated, sedated, WDWN, 56 year old man supine in NAD on the ventilator.  HEAD:  NCAT.  EYES:  PERRL, anicteric sclerae.  ENT:  No otorrhea.  Oral ETT.  Left nares NJ tube.  No anterior oral lesion.  NECK:  Supple.  LYMPH:  No cervical lymphadenopathy.  LUNGS:  Decreased posterior bibasilar breath sounds, anterior clear to  auscultation bilaterally.  CHEST:  Wound VAC on clamshell incision.  Chest tubes present.  CARDIOVASCULAR:  RRR, without murmur.  ABDOMEN: Bowel sounds present, soft, nontender by monitor to palpation.  :  Watson with leena urine.  EXTREMITIES:  Distally warm, no edema.  SKIN:  No acute rash or lesion.  Removed right arm PICC line site lacks inflammation.  Peripheral IV present without inflammation.  NEUROLOGIC:  Sedated, unresponsive, does not move to request.         Laboratory Data:     No results found for: ACD4    Inflammatory Markers    Recent Labs   Lab Test 10/14/21  0943 10/12/21  1038 09/07/21  1324 09/06/21  0633   CRP 23.0* 17.0*   < >  --    PSA  --   --   --  0.56    < > = values in this interval not displayed.     Immune Globulin Studies     Recent Labs   Lab Test 10/15/21  0907 09/07/21  1324 09/07/21  1100   * 363*  363*  --    IGM  --  51  --    IGE  --   --  83   IGA  --  103  --      Metabolic Studies       Recent Labs   Lab Test 10/26/21  0931 10/26/21  0637 10/26/21  0605 10/26/21  0145 10/25/21  2113 10/23/21  2108 10/23/21  2016 10/23/21  1553 10/23/21  1543 10/22/21  1602 10/22/21  1601 10/22/21  0959 10/22/21  0958 10/20/21  1004 10/20/21  0957 10/07/21  1811 10/07/21  1727 09/07/21  1100 09/07/21  0928   NA  --  148*  --   --  146*   < >  --   --  147*   < > 147*   < > 147*   < > 146*   < >  --    < >  --    POTASSIUM  --  4.1  --   --  4.7   < >  --   --  3.8   < > 3.6   < > 3.5   < > 4.1   < >  --    < >  --    CHLORIDE  --  117*  --   --  116*   < >  --   --  111*   < > 109   < > 106   < > 107   < >  --    < >  --    CO2  --  25  --   --  25   < >  --   --  30   < > 31   < > 34*   < > 34*   < >  --    < >  --    ANIONGAP  --  6  --    < > 5   < >  --   --  6   < > 7   < > 7   < > 5   < >  --    < >  --    BUN  --  62*  --    < > 62*   < >  --   --  63*   < > 68*   < > 62*   < > 54*   < >  --    < >  --    CR  --  1.11  --   --  1.26*   < >  --   --  1.17   < > 1.47*   < > 1.54*    < > 1.60*   < >  --    < >  --    GFRESTIMATED  --  74  --    < > 63   < >  --   --  69   < > 53*   < > 50*   < > 47*   < >  --    < >  --    * 206*   < >   < > 192*   < >  --    < > 126*   < > 148*   < > 92   < > 45*   < >  --    < >  --    A1C  --   --   --   --   --   --   --   --   --   --   --   --   --   --   --   --   --   --  6.6*   ERIK  --  8.9  --   --  8.6   < >  --   --  8.5   < > 8.2*   < > 7.9*   < > 8.0*   < >  --    < >  --    PHOS  --  3.0  --   --   --    < >  --   --  4.1   < > 2.8  --   --    < >  --    < >  --    < >  --    MAG  --  1.9  --   --   --    < >  --   --  2.0   < > 2.1  --   --    < >  --    < >  --    < >  --    LACT  --   --   --   --   --   --  0.5*  --  1.1   < >  --    < >  --    < >  --    < >  --    < >  --    PCAL  --   --   --   --   --   --   --   --   --   --   --   --   --   --   --   --  <0.05   < >  --    FGTL  --   --   --   --   --   --   --   --   --   --   --   --   --   --  399  --   --   --   --    CKT  --   --   --   --   --   --   --   --   --   --  51  --  55  --   --   --   --    < >  --     < > = values in this interval not displayed.     Hepatic Studies    Recent Labs   Lab Test 10/26/21  0637 10/25/21  2113 10/25/21  2113 10/25/21  1609 10/25/21  1141 10/22/21  1601 10/22/21  0958 09/30/21  1059 09/19/21  1629   BILITOTAL 0.3  --  0.3   < >  --    < > 0.3   < >  --    DBIL 0.1   < > <0.1   < >  --    < > 0.1   < >  --    ALKPHOS 160*   < > 157*   < >  --    < > 156*   < >  --    PROTTOTAL 5.8*   < > 5.6*   < >  --    < > 5.1*   < >  --    ALBUMIN 1.8*   < > 1.6*   < >  --    < > 1.1*   < >  --    AST 21   < > 21   < >  --    < > 27   < >  --    ALT 71*   < > 84*   < >  --    < > 281*   < >  --    LDH  --   --   --   --   --   --   --   --  423*   DOREEN  --   --   --   --  29  --  39  --   --     < > = values in this interval not displayed.     Pancreatitis testing    Recent Labs   Lab Test 10/22/21  0407 09/07/21  1324 09/07/21  1100   AMYLASE  --    --  32   TRIG 307*   < >  --     < > = values in this interval not displayed.     Hematology Studies   Recent Labs   Lab Test 10/26/21  1020 10/25/21  0326 10/25/21  0326 10/24/21  0410 10/23/21  0344 10/23/21  0344 10/22/21  0407   WBC 35.8*  --  32.8* 21.4*  --  21.3*  --    ANEU  --   --   --  19.9*  --  20.0*  --    ANEUTAUTO 30.1*  --  28.0*  --   --   --    < >   ALYM  --   --   --  0.4*   < > 0.2*  --    ALYMPAUTO 1.8   < > 1.2  --   --   --    < >   DONALD  --   --   --  0.9   < > 1.1  --    AMONOAUTO 2.3*   < > 2.1*  --   --   --    < >   AEOS  --   --   --  0.0   < > 0.0  --    AEOSAUTO 0.0   < > 0.0  --   --   --    < >   ABSBASO 0.1   < > 0.1  --   --   --    < >   HGB 9.9*  --  10.6* 9.8*   < > 9.6*   < >   HCT 32.0*  --  33.1* 30.5*   < > 30.2*   < >     --  407 201   < > 132*   < >    < > = values in this interval not displayed.     Clotting Studies    Recent Labs   Lab Test 10/26/21  0637 10/25/21  1609 10/25/21  0326 10/24/21  1626 10/23/21  0344 10/22/21  1407   INR 1.26* 1.27* 1.23* 1.19*   < > 1.28*   PTT  --   --   --   --   --  43*    < > = values in this interval not displayed.     Iron Testing    Recent Labs   Lab Test 10/26/21  1020 10/23/21  0344 10/22/21  0959 09/19/21  1629 09/19/21  1304 09/10/21  0616 09/09/21  0536   IRON  --   --   --   --   --   --  146   FEB  --   --   --   --   --   --  190*   IRONSAT  --   --   --   --   --   --  77*   ARMEN  --   --   --   --   --   --  1,049*   MCV 98   < >  --    < > 90   < > 87   HAPT  --   --  380*   < >  --   --   --    RETP  --   --   --   --  5.2*  --   --    RETICABSCT  --   --   --   --  0.177*  --   --     < > = values in this interval not displayed.     Autoimmune Testing    Recent Labs   Lab Test 09/07/21  1324   RNPIGG Negative   SMIGG Negative   SSAIGG Negative   SSBIGG Negative     Arterial Blood Gas Testing    Recent Labs   Lab Test 10/26/21  0522 10/25/21  2113 10/25/21  1136 10/25/21  0825 10/25/21  0326 10/25/21  0326  10/24/21  2355 10/24/21  2355   PH 7.56*  --  7.51* 7.52*  --  7.50*  --  7.52*   PCO2 30*  --  34* 34*  --  31*  --  33*   PO2 92  --  87 104  --  110*  --  74*   HCO3 27  --  27 28  --  24  --  26   O2PER 55 55 50 50   < > 50   < > 50    < > = values in this interval not displayed.     Thyroid Studies     Recent Labs   Lab Test 09/07/21  1100 09/06/21  0633   TSH 0.11* 0.19*   T4 0.68* 0.73*     Urine Studies     Recent Labs   Lab Test 10/25/21  1507 10/22/21  1641 10/17/21  1642 10/17/21  1041 10/15/21  1127   URINEPH 5.5 7.5* 5.0 5.0 5.5   NITRITE Negative Negative Negative Negative Negative   LEUKEST Negative Negative Negative Trace* Negative   WBCU 2 3 25* 44* 1     Medication levels    Recent Labs   Lab Test 10/25/21  0543 10/18/21  0549 10/18/21  0403   VANCOMYCIN  --   --  12.6   TACROL 11.6   < >  --     < > = values in this interval not displayed.     Microbiology:    Fungal testing  Recent Labs   Lab Test 10/20/21  0957   FGTL 399   FGTLI Positive*     Last Culture results with specimen source  Culture   Date Value Ref Range Status   10/25/2021 No growth after 12 hours  Preliminary   10/25/2021 No growth after 12 hours  Preliminary   10/25/2021 No growth after 12 hours  Preliminary   10/23/2021 No growth after 2 days  Preliminary   10/23/2021 No growth after 2 days  Preliminary   10/22/2021 1+ Normal jean marie  Final   10/22/2021 3+ Candida albicans (A)  Final   10/22/2021 No growth after 3 days  Preliminary   10/22/2021 Positive on the 3rd day of incubation (A)  Preliminary   10/22/2021 Yeast (AA)  Preliminary     Comment:     1 of 2 bottles   10/20/2021 3+ Candida albicans (A)  Final     Comment:     Susceptibilities not routinely done   10/20/2021 Candida albicans (A)  Preliminary   10/20/2021 No Growth  Final   10/20/2021 Positive on the 3rd day of incubation (A)  Final   10/20/2021 Candida albicans (AA)  Final     Comment:     1 of 2 bottles   10/20/2021 No Growth  Final    No results found for: SDES      Last check of C difficile  No results found for: CDBPCT    Quantiferon testing   Recent Labs   Lab Test 10/26/21  1020 10/25/21  0326 09/16/21  0645 09/07/21  1324   TBRES  --   --   --  Indeterminate*   LYMPH 5 4   < > 2    < > = values in this interval not displayed.     Virology:    Coronavirus-19 testing    Recent Labs   Lab Test 10/24/21  1644 10/17/21  1329 10/15/21  0614 10/08/21  1414 09/15/21  1216 09/07/21  1324 09/06/21  0010 08/30/21  0752 08/02/21  1230 06/20/20  1454   CD19  --   --   --   --   --  <1*  --   --   --   --    ACD19  --   --   --   --   --  1*  --   --   --   --    [id number] Negative Negative Negative Negative   < >  --    < >  --   --   --    COVIDPCREXT  --   --   --   --   --   --   --  Not Detected Not Detected Not Detected    < > = values in this interval not displayed.     Respiratory virus (non-coronavirus-19) testing    Recent Labs   Lab Test 10/17/21  1331   IFLUA Negative   FLUAH1 Negative   IV5603 Negative   FLUAH3 Negative   IFLUB Negative   PIV1 Negative   PIV2 Negative   PIV3 Negative   HRVS Negative   RSVA Negative   RSVB Negative   HMPV Negative   ADVBE Negative   ADVC Negative     CMV viral loads  No lab results found.    Hepatitis B Testing     Recent Labs   Lab Test 10/15/21  0907 09/16/21  2157 09/07/21  1324 09/07/21  1100   AUSAB 0.12  --   --  0.51   HBCAB Nonreactive  --   --  Nonreactive   HEPBANG Nonreactive Nonreactive   < >  --     < > = values in this interval not displayed.     Hepatitis C Antibody   Date Value Ref Range Status   10/15/2021 Nonreactive Nonreactive Final   09/08/2021 Nonreactive Nonreactive Final       CMV Antibody IgG   Date Value Ref Range Status   10/15/2021 No detectable antibody. No detectable antibody.  Final   09/07/2021 No detectable antibody. No detectable antibody.  Final     Varicella Zoster Antibody IgG   Date Value Ref Range Status   09/07/2021 Positive (A) No detectable antibody.  Final     Comment:     Suggests previous  exposure or immunization and probable immunity.     EBV Capsid Antibody IgG   Date Value Ref Range Status   10/15/2021 Positive (A) No detectable antibody. Final     Comment:     Suggests recent or past exposure.   09/07/2021 Positive (A) No detectable antibody. Final     Comment:     Suggests recent or past exposure.     Toxoplasma Antibody IgG   Date Value Ref Range Status   09/07/2021 <3.0 0.0 - 7.1 IU/mL Final     Comment:     Negative- Absence of detectable Toxoplasma gondii IgG antibodies. A negative result does not rule out acute infection.     Herpes Simplex Virus Type 1 IgG Antibody   Date Value Ref Range Status   10/15/2021 Positive.  IgG antibody to HSV-1 detected. (A) No HSV-1 IgG antibodies detected Final   09/07/2021 Positive.  IgG antibody to HSV-1 detected. (A) No HSV-1 IgG antibodies detected Final     Herpes Simplex Virus Type 2 IgG Antibody   Date Value Ref Range Status   10/15/2021 Positive.  IgG antibody to HSV-2 detected. (A) No HSV-2 IgG antibodies detected Final   09/07/2021 Positive.  IgG antibody to HSV-2 detected. (A) No HSV-2 IgG antibodies detected Final     Imaging:  Recent Results (from the past 48 hour(s))   XR Chest Port 1 View    Narrative    EXAM: XR CHEST PORT 1 VIEW  10/25/2021 1:30 AM     HISTORY:  s/p lung transplant       COMPARISON:  radiographs 10/24/2021, 10/23/2021    TECHNIQUE: Portable AP radiograph of the chest.    FINDINGS:   Endotracheal tube tip projects over the midthoracic trachea. Enteric  tube courses below the diaphragm and out of the field-of-view. Right  upper extremity PICC tip projects over the high right atrium. Stable  positioning of bilateral pleural chest tubes and mediastinal chest  tube. Postsurgical changes of bilateral lung transplantation.    The trachea is midline. Stable cardiac silhouette. Mixed interstitial  and airspace opacities laterally are not significantly changed. Trace  bilateral pleural effusions. No appreciable pneumothorax.       Impression    IMPRESSION: Status post bilateral lung transplant:  1. No significant interval change and stable support devices.  2. Small left pleural effusion. Bibasilar atelectasis. Stable  bilateral interstitial and airspace opacities.  3. No pneumothorax.    I have personally reviewed the examination and initial interpretation  and I agree with the findings.    JOSEE ROMERO MD         SYSTEM ID:  [id number]   CT Chest/Abdomen/Pelvis w Contrast   Result Value    Radiologist flags Nonocclusive venous thrombosis of the low right (Urgent)    Narrative    EXAMINATION: CT CHEST/ABDOMEN/PELVIS W CONTRAST, 10/25/2021 12:15 PM    TECHNIQUE:  Helical CT images from the lung apices through the  symphysis pubis were obtained with contrast.  Coronal and sagittal  reformatted images were generated at a workstation for further  assessment.    CONTRAST:  99 ml Isovue 370.    COMPARISON: Chest CTA and CT abdomen and pelvis 10/22/2021, same-day  chest radiograph, right upper extremity venous ultrasound 10/19/2021    HISTORY: Sepsis    FINDINGS:  Images limited by motion artifact.    Lines and tubes: Endotracheal tube tip in the mid thoracic trachea.  Enteric tube tip in the stomach. Right upper extremity PICC tip at the  superior cavoatrial junction. Mediastinal drain tips overlying the  arch. Bilateral chest tubes in similar position with right inferior  chest tube tip in similar position in the right 8th-9th intercostal  space, not definitively in the right pleural space. Watson catheter  within the bladder.    Lungs: Postoperative changes of bilateral lung transplantation. Trace  right hydropneumothorax. No left pneumothorax. Small left pleural  effusion. Associated compressive atelectasis bilaterally. Decreased  consolidative opacities in the posterolateral left upper lobe and  bilateral lung bases. Diffuse bronchovascular thickening.  Heart and mediastinum: Trace dependent debris in the trachea. Central  tracheobronchial tree  is otherwise patent. Heart size is normal. No  significant pericardial effusion. Mild coronary artery calcifications.  Thoracic vasculature: Normal caliber thoracic aorta. Normal caliber  pulmonary artery. Nonocclusive thrombosis of the low right internal  jugular vein.  Lymph nodes: No suspicious lymphadenopathy.  Thyroid: No suspicious nodules.    Liver: Unchanged subcentimeter hypodensity in the right hepatic lobe  (series 3, image 263), too small to characterize. No suspicious  lesions. Portal veins appear patent.  Gallbladder: No cholelithiasis or evidence of acute cholecystitis. No  intra- or extra-hepatic biliary ductal dilatation.  Spleen: Unremarkable.  Pancreas: Similar density at the pancreaticoduodenal groove, otherwise  unremarkable.  Adrenal glands: No discrete nodules.  Kidneys: No hydronephrosis. No nephrolithiasis. No suspicious mass.  Bladder / Pelvic organs: Bladder is decompressed with Watson. No pelvic  mass.  Bowel: Hyperdense colonic contents. No evidence of obstruction. The  appendix is unremarkable. Colonic diverticulosis without evidence of  acute diverticulitis.  Lymph nodes: No suspicious lymphadenopathy.  Peritoneum / Retroperitoneum: No pneumoperitoneum. No organized fluid  collections.  Abdominal vasculature: Major vascular structures of the abdomen are  patent and normal in caliber.    Bones and soft tissues: Degenerative changes of the visualized spine.  No acute osseous abnormalities or suspicious bony lesions.  Subcutaneous and intramuscular emphysema along the anterior chest wall  and inferior neck. Clamshell sternotomy wires are intact. Trace  anasarca.      Impression    IMPRESSION:   1. Decreased consolidative opacities in the posterolateral left upper  lobe and bilateral lung bases, which may represent improving infection  and/or atelectasis.  2. Nonocclusive venous thrombosis of the low right internal jugular  vein.  3. Decreased trace right hydropneumothorax and small left  pleural  effusion.  4. Support devices are in similar position, including the right  inferior chest tube tip near the 8th-9th intercostal space, not  definitively in the pleural space.   5. Decreased anterior chest subcutaneous/intramuscular emphysema.  6. The remainder of the exam is not significantly changed since  10/22/2021.    [Urgent Result: Nonocclusive venous thrombosis of the low right  internal jugular vein.]    Finding was identified on 10/25/2021 12:58 PM.     Dr. Elias was contacted by Dr. Llamas at 10/25/2021 1:38 PM and  verbalized understanding of the urgent finding.     I have personally reviewed the examination and initial interpretation  and I agree with the findings.    JOSEE ROMERO MD         SYSTEM ID:  [id number]   XR Feeding Tube Placement    Narrative    Feeding tube placement: 10/25/2021 3:17 PM    Comparison: CT 10/25/2021. Radiograph 10/23/2021.    Indication: Feeding tube placement    Fluoroscopy time: 2.3 minutes    Technique: After injection of Xylocaine gel into the left nostril, a  feeding tube was advanced under fluoroscopic guidance.    Findings: The feeding tube was advanced with the tip in the third  portion of the duodenum. A small amount of barium was injected to  demonstrate placement within the small bowel. The feeding tube was  then flushed with normal saline. The feeding tube was secured using  tape on the nasal bridge and Tegaderm on the cheek. Small amount of  contrast overlying the proximal duodenum may represent a small  diverticulum containing contrast when correlated to prior CT and  radiograph.      Impression    Impression: Uncomplicated feeding tube placement with tip in the third  portion of the duodenum.    I, KAMLA HSIN MD, attest that I was immediately available to  provide guidance and assistance during the entirety of the procedure.  Radiologist was not present directly for tube placement as was  performed portable in the ICU.    I have personally  reviewed the examination and initial interpretation  and I agree with the findings.    KAMLA SHIN MD         SYSTEM ID:  [id number]   XR Chest Port 1 View    Narrative    XR CHEST PORT 1 VIEW  10/25/2021 6:09 PM      HISTORY: s/p chest tube pull    COMPARISON: Chest x-ray 10/25/2021.    FINDINGS:   Portable AP 20 degree upright chest x-ray. Postsurgical changes of the  chest. Clamshell sternotomy wires are intact. Endotracheal tube tip  terminates over the low trachea. Enteric orogastric tubes are  partially visualized. Mediastinal drain. Bibasilar chest tubes.  Interval removal of the apically directed chest tubes.    Trachea is midline. Cardiac silhouette is enlarged. Pulmonary  vasculature is indistinct. Persistent bilateral perihilar interstitial  opacities. Small left pleural effusion with increased left basilar  opacities. No appreciable pneumothorax.    Osseous structures are intact. Visualized soft tissues and upper  abdomen are unremarkable.      Impression    IMPRESSION:   1. Interval removal of apically directed chest tubes. No appreciable  pneumothorax.  2. Cardiomegaly with persistent bilateral interstitial opacities  suggestive for pulmonary edema versus atelectasis.  3. Small left pleural effusion with increased left basilar atelectasis  versus consolidation.  4. Additional lines and tubes in stable position.    I have personally reviewed the examination and initial interpretation  and I agree with the findings.    ANUPAM GASTON DO         SYSTEM ID:  [id number]   CT Head w/o Contrast    Narrative    EXAM: CT HEAD W/O CONTRAST  LOCATION: [facility]  DATE/TIME: 10/25/2021 9:44 PM    INDICATION: Code Stroke to evaluate for potential thrombolysis and thrombectomy.  Stopped opening eyes to speak, unresponsive, no longer has cough when suctioned. Possible stroke/cerebral infarct. Confusion. Altered mental status.  COMPARISON: MRI brain  10/23/2021  TECHNIQUE: Routine CT Head without IV contrast. Multiplanar reformats. Dose reduction techniques were used.    FINDINGS:  Motion and streak artifact limits aspects of exam.    INTRACRANIAL CONTENTS:   This exam is significantly limited by motion and streak artifact. MRI brain 10/23/2021 demonstrated multiple acute/subacute infarcts throughout the bilateral cerebral hemispheres and left cerebellum. Some of these acute/subacute infarcts demonstrated   microhemorrhage on SWI.    Left posterior parietal lobe acute/subacute infarct is identified. Remaining infarcts are not identified. It is possible that a component of the abnormal signal on MRI may of been attributed to posterior reversible encephalopathy syndrome.    No CT evidence for a new cortical hypoattenuation to indicate a new acute infarct on current exam.    No definite acute intracranial hemorrhage given artifact. No midline shift. No hydrocephalus.    Mild global brain volume loss.    VISUALIZED ORBITS/SINUSES/MASTOIDS: No intraorbital abnormality. No paranasal sinus mucosal disease. Bilateral mastoid air cells extensively opacified.    BONES/SOFT TISSUES: No acute abnormality. Nasogastric tube.      Impression    IMPRESSION:  This exam is significantly limited by motion and streak artifact.     MRI brain 10/23/2021 demonstrated multiple acute/subacute infarcts throughout the bilateral cerebral hemispheres and left cerebellum. Some of these acute/subacute infarcts demonstrated microhemorrhage on SWI.    Left posterior parietal lobe acute/subacute infarct is identified on current exam. Remaining previously seen infarcts are not identified possibly obscured by artifact. It is possible that a component of the abnormal signal on MRI may of been attributed   to posterior reversible encephalopathy syndrome in addition to acute/subacute infarcts.    No CT evidence for a new cortical hypoattenuation to indicate a new acute infarct on current exam.    No  definite acute intracranial hemorrhage given artifact.     No midline shift.     Dr. Kirstin Leos was notified by Dr Lobito Rose at  10:20 PM 10/25/2021.   CTA Head Neck with Contrast    Narrative    EXAM: CTA  HEAD NECK WITH CONTRAST  LOCATION: [facility]  DATE/TIME: 10/25/2021 9:44 PM    INDICATION: Code Stroke to evaluate for potential thrombolysis and thrombectomy. Confusion. Altered mental status. Stopped opening eyes to speak, unresponsive, no longer has cough when suctioned. Stroke/cerebral infarct suspected.  COMPARISON: None.  CONTRAST: iopamidol (ISOVUE-370) solution 75 mL  TECHNIQUE: Head and neck CT angiogram with IV contrast. Axial helical CT images of the head and neck vessels obtained during the arterial phase of intravenous contrast administration. Axial 2D reconstructed images and multiplanar 3D MIP reconstructed   images of the head and neck vessels were performed by the technologist. Dose reduction techniques were used. All stenosis measurements made according to NASCET criteria unless otherwise specified.    FINDINGS:   Motion and streak artifact limits aspects of exam.    HEAD CTA:  ANTERIOR CIRCULATION: No significant stenosis/occlusion, aneurysm, or AVM/AVF. Standard [place] of [place] anatomy.    POSTERIOR CIRCULATION: No significant stenosis/occlusion, aneurysm, or AVM/AVF. Dominant left and smaller right vertebral artery contribute to a normal basilar artery.     DURAL VENOUS SINUSES: Not well evaluated on a technical basis.      NECK CTA:  RIGHT CAROTID: No significant stenosis/occlusion. No dissection.    LEFT CAROTID: No significant stenosis/occlusion. No dissection.    VERTEBRAL ARTERIES:  Right vertebral artery mild stenosis. Left vertebral artery origin severe high-grade stenosis.    Remaining bilateral extradural vertebral arteries demonstrate no significant stenosis/occlusion. No dissection.      Dominant left and smaller right vertebral  arteries.    AORTIC ARCH: Suboptimally visualized on this exam.    ARTERIAL PLAQUE: Scattered calcified/noncalcified plaque within the arterial system.    NONVASCULAR STRUCTURES:   Scattered foci of air within the venous system nonspecific likely iatrogenic. Endotracheal tube. Nasogastric tube. Left upper lobe posterior segment dependent patchy opacities. Degenerative changes noted within the spine.        Impression     IMPRESSION:   HEAD CTA:   1.  No intracranial arterial large vessel occlusion.  2.  No significant stenosis/occlusion.    NECK CTA:  1.  Right vertebral artery mild stenosis.   2.  Left vertebral artery origin severe high-grade stenosis.  3.  Otherwise, no significant stenosis/occlusion. No dissection.    Dr. Kirstin Leos was notified by Dr Lobito Rose at  10:35 PM 10/25/2021.    XR Chest Port 1 View    Narrative    EXAM: XR CHEST PORT 1 VIEW  10/26/2021 1:05 AM     HISTORY:  s/p lung transplant       COMPARISON:  Radiograph and CT 10/25/2021    TECHNIQUE: Portable AP radiograph of the chest.    FINDINGS:   Endotracheal tube tip projects over the midthoracic trachea. Feeding  tube and enteric tube course below the diaphragm and beyond the  field-of-view. No significant change in the position of the bilateral  pleural chest tubes.    Post surgical changes of bilateral lung transplantation. Intact median  sternotomy wires. The trachea is midline. Heart size is within normal  limits. Improved left pleural effusion and associated lower lobe  opacities. Stable small right pleural effusion. No pneumothorax. No  new focal consolidative opacities.      Impression    IMPRESSION:   1. Improved left pleural effusion and associated lower lobe opacity.  2. Stable small right pleural effusion.  3. No new focal airspace opacities.  4. Stable support devices.     I have personally reviewed the examination and initial interpretation  and I agree with the findings.    JODI MENDEZ MD         SYSTEM ID:   [id number]     10/26 Brain MRI:  Pending  10/26 CXR:  Improved left pleural effusion and associated lower lobe opacity.  Stable small right pleural effusion.  No new focal airspace opacities.  Stable support devices.  10/26 Head CT:  This exam is significantly limited by motion and streak artifact.  MRI brain 10/23/2021 demonstrated multiple acute/subacute infarcts throughout the bilateral cerebral hemispheres and left cerebellum. Some of these acute/subacute infarcts demonstrated microhemorrhage on SWI.  Left posterior parietal lobe acute/subacute infarct is identified on current exam. Remaining previously seen infarcts are not identified possibly obscured by artifact. It is possible that a component of the abnormal signal on MRI may of been attributed to posterior reversible encephalopathy syndrome in addition to acute/subacute infarcts.  No CT evidence for a new cortical hypoattenuation to indicate a new acute infarct on current exam.  No definite acute intracranial hemorrhage given artifact.  No midline shift.  10/25 ABX:  Uncomplicated feeding tube placement with tip in the third portion of the duodenum.  10/25 Head / neck CTA:  HEAD CTA:  1.  No intracranial arterial large vessel occlusion.  2.  No significant stenosis/occlusion.  NECK CTA:  1.  Right vertebral artery mild stenosis.  2.  Left vertebral artery origin severe high-grade stenosis.  3.  Otherwise, no significant stenosis/occlusion. No dissection.  10/25 PM CXR:  1. Interval removal of apically directed chest tubes. No appreciable pneumothorax.  2. Cardiomegaly with persistent bilateral interstitial opacities suggestive for pulmonary edema versus atelectasis.  3. Small left pleural effusion with increased left basilar atelectasis versus consolidation.  4. Additional lines and tubes in stable position.   10/25 Chest / abdm CT:  1. Decreased (versus 10/22/21 CT scan) consolidative opacities in the posterolateral left upper lobe and bilateral lung bases,  which may represent improving infection and/or atelectasis.  2. Nonocclusive venous thrombosis of the low right internal jugular vein.  3. Decreased trace right hydropneumothorax and small left pleural effusion.  4. Support devices are in similar position, including the right inferior chest tube tip near the 8th-9th intercostal space, not definitively in the pleural space.  5. Decreased anterior chest subcutaneous/intramuscular emphysema.  6. The remainder of the exam is not significantly changed since 10/22/2021.  10/25 CXR:  1. No significant interval change and stable support devices.  2. Small left pleural effusion. Bibasilar atelectasis. Stable bilateral interstitial and airspace opacities.  3. No pneumothorax.  10/24 CXR:  1.  Stable postsurgical bilateral lung transplant changes with mild  perihilar interstitial opacities suggestive for pulmonary edema versus atelectasis.  2.  Support devices in stable position. No appreciable pneumothorax.   10/23 TTE:  No evidence of endocarditis. Normal biventricular function.  No valvular disease.  10/23 Brain MRI:  Multifocal subacute infarcts within both cerebral hemispheres and left cerebellum all of which appear present on prior head CT 10/22/2021.  Minimal petechial hemorrhage associated with the infarct in the left occipital lobe.  10/23 ABXs:  Gastric tube tip and sidehole projected over the stomach.  Nonobstructive bowel gas pattern.  10/23 TTE:  Valves cannot be assessed in this echo due to bandages on the chest.   10/23 CXR:  1. Postsurgical changes bilateral lung transplant.  2. Stable support devices.  3. Small left pleural effusion. Bibasilar atelectasis. Stable bilateral interstitial and airspace opacities.  4. No appreciable pneumothorax.  10/22 Chest / abdm CT angiogram:  1. No acute pulmonary embolus.  2. Surgical changes of bilateral lung transplantation are stable compared to exam performed earlier today. Similar appearance of opacities throughout both  lungs, likely representing atelectasis and/or consolidation.  3. No acute abnormality in the abdomen or pelvis.     10/22 Chest CT:  1. Postoperative changes of bilateral lung transplantation with supportive devices in stable positioning.  2. Multifocal consolidative opacities involving the left upper, left lower, and right lower lobes with scattered mucous plugging.  Concerning for aspiration or infection.  3. Multifocal diffuse groundglass opacities with mild interlobular septal thickening, compatible with pulmonary edema.  4. Extensive subcutaneous emphysema of the anterior chest wall and right neck.

## 2021-10-26 NOTE — PLAN OF CARE
RN stopped propofol at 1958 to do neuro exam and found patient to be unresponsive.  Patient had previously opened eyes spontaneously and to voice, but did not open eyes with sternal rub, chest physiotherapy, or repositioning.  Patient also did not cough in response to being deep suctioned via ETT.  Stroke code was called at 2100.  Pt's left pupil was also found to be smaller than the right pupil which is also new for the patient, but pupils are still reactive to light.  MD was also notified that temp was higher at 101 degrees farenheit and that pleural fluid gram stain had come back with 2+ yeast and 3+ WBC primarily PMN.  Per Neurology, patient's heparin was turned off and patient went down for a head CT scan.    Plan: Brain MRI, continue to monitor neuro status, and keep heparin gtt off until after brain MRI.

## 2021-10-26 NOTE — PLAN OF CARE
Major Shift Events:  Neuro; Unchanged, neuro still following. Febrile today, blood cultures are positive for yeast. Cardiac: NSR 80-90s MAP  today. PULM: high RR rates today from 30-55. GI/: starting insulin drip today. X1 loose BM.   MRI today..no major changes since last MRI. See chart.    See chart for all other details. All safety checks and questions by family addressed.      Plan: Neuro checks Q1,       For vital signs and complete assessments, please see documentation flowsheets.

## 2021-10-26 NOTE — PROGRESS NOTES
"Bronchoscopy Risk Assessment Guidelines      A. Patient symptoms to consider when assessing pulmonary TB risk are:    I. Cough greater than 3 weeks; and fever, hemoptysis, pleuritic chest    pain, weight loss greater than 10 lbs, night sweats, fatigue, infiltrates on    upper lobes or superior segments of lower lobes, cavitation on chest    x-ray.   B. Patient risk factors to consider when assessing pulmonary TB risk are:    I. Exposure to known TB case, foreign-born persons (within 5 years of    arrival to [place]), residence in a crowded setting (correctional facility,     long-term care center, etc.), persons with HIV or immunosuppression.    Patients with symptoms and risk factors should generally be considered \"suspect risk\" and bronchoscopies should be performed in airborne precautions.    This patient has NO KNOWN RISK of Tuberculosis (proceed with bronchoscopy)    Specimens sent: yes  Complications: None  Scope used: #[id number] Adult  Attending Physician: Sunshine Monson, RT on 10/26/2021 at 3:57 PM  "

## 2021-10-26 NOTE — PROGRESS NOTES
[facility]    Stroke Progress Note    Interval EventsStroke Code called 10/25 for decreased responsiveness on exam, bedside nursing previously getting pt to open eyes to voice/nox stim. LKW 4PM that day, but at change of shift, nursing noticed that the pt would no longer open his eyes to any stimulation and had lost his cough to deep suctioning. Continue to be without movement in any extremity to nox stim. Obtained CTH and CTA Head/Neck which again demonstrated prior known strokes, though was significantly limited by streak artifact and motion, without clear new/evolving infarct. Deferred thrombolytics given presence of heparin gtt and recent established strokes. Deferred thrombectomy due to lack of LVO. Stroke Code was subsequently de-escalated    Pt remains unable to engage in meaningful communication    HPI Summary  Edson Thornton is a 56 year old male with with Afib, HTN, RA, SALTY, and ILD s/p lung transplant 10/16/21.  Per primary team he has had significant difficulty with ventilation required paralytics and sedation, however, he has typically moved all his extremities with full strength.  It was noted on 10/21 that he was no longer moving his lower extremities.  Stroke code activated on morning of 10/22 for persistent neurologic change.  CT and Brain MRI showed subacute bilateral cortical infarcts and left cerebellar infarct.  Unchanged neurologic exam while following.     Stroke Evaluation Summarized    MRI/Head CT MRI Brain (10/23/2021):  Impression:  Multifocal subacute infarcts within both cerebral hemispheres and left cerebellum all of which appear present on prior head CT 10/22/2021.  Minimal petechial hemorrhage associated with the infarct in the left occipital lobe.     Head CT (10/22/2021) : multiple scattered subacute ischemic strokes, predating exam change from 10/22.  May contribute to encephalopathy, but would not explain lack of extremity  movement.     Head CT (10/25/2021): This exam is significantly limited by motion and streak artifact. L posterior parietal lobe acute/subacute infarct is identified on current exam w/ remaining previously seen infarct possibly obscured by artifact. No CT evidence for new cortical hypoattentuation to indicate a new acute infarct. No definite acute intracranial hemorrhage or midline shift     Intracranial Vasculature   Head CTA (10/25/2021): No intracranial arterial large vessel occlusion or significant stenosis/occlusion    Head CTA (10/22/2021): demonstrates no aneurysm or stenosis of major intracranial arteries.    CT Perfusion (10/22/2021): focally decreased cerebral blood volume and cerebral blood flow in the posterior most left parieto-occipital region with corresponding slight increase in TTP possibly representing an area of hypoperfusion or core infarct.      Cervical Vasculature   CTA Neck (10/22/2021): Mild stenosis in the R vertebral artery w/ severe high-grade stenosis at the L vertebral artery origin    CTA Neck (10/22/2021): No stenosis of major cervical arteries     Echocardiogram TC reported no valvular vegetations and no pulmonary vein thrombosis per primary team.     EKG/Telemetry   Sinus tachycardia   Otherwise normal ECG   When compared with ECG of 21-OCT-2021 06:50,   ST no longer depressed in Anterior leads   ST no longer elevated in Lateral leads      Other Testing   EEG showed no epileptiform discharges.  Positive for diffuse encephalopathy.      LDL  No lab value available in past 30 days   A1C  No lab value available in past 30 days   Troponin No lab value available in past 48 hrs       Impression   # Acute to subacute ischemic stroke of bilateral cerebral hemispheres and left cerebellum with suspected cardioembolic etiology  # Encephalopathy with Lack of Reactivity on Physical Exam    Pt's initial stroke code was called on 10/22 for change in physical exam. Per nursing report, the pt had  previously been moving all extremities to the point of requiring restraints. However, pt suddenly stopped moving altogether on 10/21 ~7PM. No Stroke Code was called until the next morning. CTH revealed multiple likely subacute ischemic infarcts in the bilateral cerebral hemispheres and L cerebellar hemisphere while CTA Head/Neck was unrevealing. Also performed a CT Perfusion (10/22) which noted similar findings. At the time, sustained clonus was noted on exam, raising concerns for cervical myelopathy. However, this improved on subsequent exams and CT C-spine was unremarkable. The remainder of the stroke work-up was completed w/ MRI Brain again showing similar finding to CT images. At this time, raised concern that pt's noted strokes did not correspond well to his current deficits and that these were unlikely to be the cause of his change in physical exam. Obtained an EEG which only revealed diffuse encephalopathy. Pt was noted to have multiple metabolic/toxic derangements and so these were seen as the likely culprits. Has since been found to have an BRENNAN and Candidemia    A second stroke code has now been called, again for decreased reactivity on exam. Repeat CTH and CTA Head/Neck during the code were unrevealing. Exam appears mildly improved today but still seems too dense to be solely metabolic abnormalities and ongoing hypoxic/hypercapneic respiratory failure. Infection is highly suspect as he has begun to spike recurrent fevers. These continue despite the pt receiving empiric antibiotics, antivirals, and is now on antifungals. Primary team in the process of determining source of infection. Also of concern is increased stroke burden not seen on CT imaging or potential for a broader cortical damage caused by something such as anoxic brain injury or the potential like PRES. As such, will get repeat MRI Brain to re-assess stroke burden. Could also consider LP if no other source of infection is found. Repeat EEG  monitoring is unlikely to provide much insight as pt's exam has not truly changed overly much and no NSCE was found previously, the study only notable for diffuse encephalopathy.    Recommendations:  - Hold Heparin gtt (for Afib) pending MRI Brain results  - MRI Brain w/ and w/o contrast  - Consider LP if no other source of infection is found  - Neurology will continue to follow for MRI results  .     Thank you for including neurology in the care of this patient.  Please call with additional questions or concerns relating to stroke.     This patient was staffed with the attending stroke physician, Dr. Albarran.      Chiqui Giordano  Neurology PGY2  P:   ______________________________________________________    Clinically Significant Risk Factors Present on Admission                  Medications   Scheduled Meds    acetaminophen  975 mg Oral or Feeding Tube Q8H MARKY     acetylcysteine  2 mL Nebulization 4x Daily     acyclovir  400 mg Oral or NG Tube BID     amiodarone  400 mg Oral BID     amLODIPine  10 mg Oral or Feeding Tube Daily     bumetanide  2 mg Intravenous TID     calcium carbonate 600 mg-vitamin D 400 units  1 tablet Oral or Feeding Tube BID w/meals     carvedilol  12.5 mg Oral or Feeding Tube BID     fluconazole  400 mg Oral Daily     gadobutrol  10 mL Intravenous Once     hydrALAZINE  100 mg Oral or Feeding Tube Q6H     insulin aspart  1-12 Units Subcutaneous Q4H     isosorbide dinitrate  40 mg Oral or Feeding Tube TID     levalbuterol  1.25 mg Nebulization 4x Daily     levothyroxine  25 mcg Oral Daily     multivitamins w/minerals  15 mL Per Feeding Tube Daily     mycophenolate  1,500 mg Oral or NG Tube BID     nystatin  1,000,000 Units Swish & Swallow 4x Daily     pantoprazole (PROTONIX) IV  40 mg Intravenous BID AC     polyethylene glycol  17 g Oral or Feeding Tube Daily     potassium chloride  20 mEq Oral Daily     [START ON 10/31/2021] predniSONE  15 mg Oral QAM    And     [START ON 10/31/2021]  predniSONE  12.5 mg Oral QPM     predniSONE  15 mg Per Feeding Tube BID     protein modular  1 packet Per Feeding Tube BID     rosuvastatin  10 mg Oral or Feeding Tube Daily     senna-docusate  1 tablet Oral or Feeding Tube BID     sodium chloride (PF)  3 mL Intracatheter Q8H     sulfamethoxazole-trimethoprim  10 mL Oral or NG Tube Daily    Or     sulfamethoxazole-trimethoprim  1 tablet Oral or NG Tube Daily     tacrolimus  1.5 mg Per OG Tube BID IS       Infusion Meds    dextrose Stopped (10/24/21 1008)     fentaNYL Stopped (10/25/21 2035)     [Held by provider] heparin Stopped (10/25/21 2129)     propofol (DIPRIVAN) infusion Stopped (10/25/21 1958)     BETA BLOCKER NOT PRESCRIBED         PRN Meds  bisacodyl, dextrose, glucose **OR** dextrose **OR** glucagon, diphenhydrAMINE **OR** diphenhydrAMINE, fentaNYL, HYDROmorphone **OR** HYDROmorphone, labetalol, lidocaine 4%, lidocaine, lidocaine (buffered or not buffered), magnesium hydroxide, methocarbamol, naloxone **OR** naloxone **OR** naloxone **OR** naloxone, ondansetron **OR** ondansetron, oxyCODONE **OR** oxyCODONE, prochlorperazine **OR** prochlorperazine, BETA BLOCKER NOT PRESCRIBED, sodium chloride (PF)       PHYSICAL EXAMINATION  Temp:  [99.5  F (37.5  C)-101.7  F (38.7  C)] 100.4  F (38  C)  Pulse:  [] 89  Resp:  [22-39] 25  BP: ()/(54-91) 125/68  FiO2 (%):  [50 %-55 %] 50 %  SpO2:  [92 %-99 %] 96 %      Mental Status:  Intubated, does not follow commands, does not open eyes to noxious stim, but does resist eye opening/shining light into eyes  Cranial Nerves:  PERRL, +corneal reflex bilaterall, +cough and gag reflex. Face appears grossly symmetric, does occasionally resist eye opening/shining light into his eyes bilaterally  Motor: No abnormal movements. Fails to withdraw to noxious stim or produce any other movements  Reflexes:  Reflexes brisk globally. Mute toes. No clonus   Sensory: Fails to withdraw to noxious stim in all  extremitities  Coordination:  Unable to assess  Station/Gait:  Deferred    Stroke Scales    NIHSS  Interval     Interval Comments stroke code (10/25/21 2124)   1a. Level of Consciousness 3-->Responds only with reflex motor or autonomic effects or totally unresponsive, flaccid, and areflexic   1b. LOC Questions 2-->Answers neither question correctly   1c. LOC Commands 2-->Performs neither task correctly   2.   Best Gaze 2-->Forced deviation, or total gaze paresis not overcome by the oculocephalic maneuver   3.   Visual 0-->No visual loss   4.   Facial Palsy 3-->Complete paralysis of one or both sides (absence of facial movement in the upper and lower face)   5a. Motor Arm, Left 4-->No movement   5b. Motor Arm, Right 4-->No movement   6a. Motor Leg, Left 4-->No movement   6b. Motor Leg, right 4-->No movement   7.   Limb Ataxia 0-->Absent   8.   Sensory 2-->Severe to total sensory loss, patient is not aware of being touched in the face, arm, and leg   9.   Best Language 3-->Mute, global aphasia, no usable speech or auditory comprehension   10. Dysarthria (UN) Intubated or other physical barrier   11. Extinction and Inattention  0-->No abnormality   Total 33 (10/25/21 2124)       Imaging  I personally reviewed all imaging; relevant findings per HPI.     Lab Results Data   CBC  Recent Labs   Lab 10/26/21  1020 10/25/21  0326 10/24/21  0410   WBC 35.8* 32.8* 21.4*   RBC 3.28* 3.53* 3.24*   HGB 9.9* 10.6* 9.8*   HCT 32.0* 33.1* 30.5*    407 201     Basic Metabolic Panel    Recent Labs   Lab 10/26/21  0931 10/26/21  0637 10/26/21  0605 10/26/21  0145 10/25/21  2113 10/25/21  1941 10/25/21  1609   NA  --  148*  --   --  146*  --  146*   POTASSIUM  --  4.1  --   --  4.7  --  4.7   CHLORIDE  --  117*  --   --  116*  --  114*   CO2  --  25  --   --  25  --  26   BUN  --  62*  --   --  62*  --  59*   CR  --  1.11  --   --  1.26*  --  1.18   * 206* 204*   < > 192*   < > 166*   ERIK  --  8.9  --   --  8.6  --  8.7     < > = values in this interval not displayed.     Liver Panel  Recent Labs   Lab 10/26/21  0637 10/25/21  2113 10/25/21  1609   PROTTOTAL 5.8* 5.6* 5.7*   ALBUMIN 1.8* 1.6* 1.6*   BILITOTAL 0.3 0.3 0.5   ALKPHOS 160* 157* 163*   AST 21 21 20   ALT 71* 84* 88*     INR    Recent Labs   Lab Test 10/26/21  0637 10/25/21  1609 10/25/21  0326   INR 1.26* 1.27* 1.23*      Lipid Profile    Recent Labs   Lab Test 10/22/21  0407 10/21/21  0354 10/20/21  0308 10/19/21  0338 09/07/21  1324   CHOL  --   --   --   --  214*   HDL  --   --   --   --  51   LDL  --   --   --   --  130*   TRIG 307* 486* 383*   < > 364*    < > = values in this interval not displayed.     A1C    Recent Labs   Lab Test 09/07/21  0928 09/05/21  1901   A1C 6.6* 6.5*     Troponin I    Recent Labs   Lab 10/22/21  0958   TROPONIN 0.807*

## 2021-10-26 NOTE — PROGRESS NOTES
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 Corral.  Course c/b CVA, encephalopathy, acute respiratory failure, acute kidney injury, fungemia.    OVERNIGHT EVENTS:  Stroke code for pupil changes and not opening eyes to voice.      TODAY'S PROGRESS:   - will d/w neurology regarding heparin gtt  - await brain MRI   - bronch today per pulmonary   - advance TF to goal      PLAN:  Neuro/ pain/ sedation:  Acute postoperative pain  Anxiety and depression  Acute to subacute CVA, b/l cerebral hemispheres and L cerebellum  Encephalopathy and diffuse weakness  - Pain: prn oxy   - Sedation: off  - PTA escitalopram on hold   - low-intensity heparin gtt on hold, will d/w neurology   - appreciate neurology  - await MRI      Pulmonary care:   SALTY on home CPAP  Acute hypoxic respiratory failure  S/p b/l lung transplant 10/16/21  - Ventilator Settings:CMV- 18/400/5/55  - Levalbuterol nebs and Mucomyst, chest PT  - Daily CXR  - PST BID  - Titrate supplemental oxygen to maintain saturation above 92%.  - Pulmonary hygiene: Incentive spirometer every 15-30 minutes when awake, flutter valve, C&DB  - 2mg bumex TID today, goal net -500-1L  - thoracic consult for trach and peg-j  - bronch today per Pulm    Cardiovascular:    HTN  Afib   PFO  TC 10/23 no vegetations  Cardiogenic shock - resolved, now hypertensive - now improved   - Monitor hemodynamic status.   - MAP <100, SBP <140  - Amlodipine 10 mg daily. Hydralazine 100 mg oral q6, isordil to 40 tid  - coreg 12.5 BID   - prn labetalol  - Statin: rosuvastatin 10mg  - ASA: holding  - Amiodarone 400 mg BID  - low-intensity heparin gtt on hold, will dw neurology      GI care/ Nutrition:   Elevated LFTs- resolved   GI bleed - NG trauma per GI, resolved   - Diet: advancing to goal TF via NJ   - PPI BID  - Continue bowel regimen: miralax, senna    Renal/ Fluid Balance/ Electrolytes:   Acute kidney injury - resolving  BL creat appears to be ~ 0.7  - bumex 2 TID for goal net negative -500-1L    - Strict I/O, daily weights  - Avoid/limit nephrotoxins as able  - Replete lytes PRN per protocol     Endocrine:    Stress induced hyperglycemia with steroid-induced component, on DM2  Hypothyroidism  RA  Preop A1c 6.6  - sliding scale insulin, transition to high intensity   - Goal BG <180 for optimal healing  - PTA meds: Solumedrol 125 mg q6, Synthroid 25 mcg  - Received 2 doses of Rituximab 6/21  - Rheumatology consult     ID/ Antibiotics:  Immunosuppression  Stress induced leukocytosis  Fungemia, source unclear, likely lung   - Micafungin (10/22-11/8 anticipated)  - Nystatin, Acyclovir, bactrim   - Tacrolimus, prednisone  - Continue to monitor fever curve, WBC and inflammatory markers as appropriate  - Follow up cultures   - appreciate transplant ID  - appreciate Ophthalmology   - check c diff       Heme:     Acute blood loss anemia  Hypogammaglobulinemia s/p IVIG  Thrombocytopenia, HIT negative - resolved   No s/sx active bleeding  - CBC in AM  - ID as above    MSK/ Skin:  Sternotomy  Surgical Incision  - Sternal precautions  - Postoperative incision management per protocol  - PT/OT/CR     Prophylaxis:    - Mechanical prophylaxis for DVT: SCDs  - Chemical DVT prophylaxis: Heparin gtt on hold   - PPI for 30 days postoperatively     Lines/ tubes/ drains:  - ETT(10/16)  - Watson 10/25   - Chest tubes- 2 pleural, 1 mediastinal(10/16)  - OG (10/16)   - NJ (10/18)     Disposition:  - CV ICU.      Patient seen, findings and plan discussed with CV ICU staff.     Altagracia Allan MD  Surgery Resident PGY-3  Pg [phone]    ====================================    SUBJECTIVE:   Opens eyes slightly to voice      OBJECTIVE:   1. VITAL SIGNS:   Temp:  [100.4  F (38  C)-101.7  F (38.7  C)] 100.4  F (38  C)  Pulse:  [] 95  Resp:  [22-39] 30  BP: ()/(54-90) 138/80  MAP:  [65 mmHg-88 mmHg] 70 mmHg  Arterial Line BP: ()/(22-68) 112/50  FiO2 (%):  [50 %-55 %] 55 %  SpO2:  [92 %-98 %] 97 %  Ventilation Mode: CMV/AC  (Continuous Mandatory Ventilation/ Assist Control)  FiO2 (%): 55 %  Rate Set (breaths/minute): 18 breaths/min  Tidal Volume Set (mL): 400 mL  PEEP (cm H2O): 5 cmH2O  Oxygen Concentration (%): 55 %  Resp: 30      2. INTAKE/ OUTPUT:   I/O last 3 completed shifts:  In: 1520.66 [I.V.:410.66; NG/GT:420]  Out: 2168 [Urine:2145; Chest Tube:23]    3. PHYSICAL EXAMINATION:   General: intubated  Neuro: opens eyes to name, not following commands, no motor observed   Resp: overbreathing vent   CV: regular/tachy on tele  Abdomen: nondistended, soft   Incisions: c/d/i  Extremities: warm and well perfused, no edema  CTs serosanguinous     4. INVESTIGATIONS:   Arterial Blood Gases   Recent Labs   Lab 10/26/21  0522 10/25/21  1136 10/25/21  0825 10/25/21  0326   PH 7.56* 7.51* 7.52* 7.50*   PCO2 30* 34* 34* 31*   PO2 92 87 104 110*   HCO3 27 27 28 24     Complete Blood Count   Recent Labs   Lab 10/25/21  0326 10/24/21  0410 10/23/21  0344 10/22/21  2013 10/22/21  1407 10/22/21  0958   WBC 32.8* 21.4* 21.3*  --   --  20.7*   HGB 10.6* 9.8* 9.6* 9.0*   < > 9.1*    201 132*  --   --  112*    < > = values in this interval not displayed.     Basic Metabolic Panel  Recent Labs   Lab 10/26/21  0637 10/26/21  0605 10/26/21  0145 10/25/21  2113 10/25/21  1941 10/25/21  1609 10/25/21  1043 10/25/21  0825   *  --   --  146*  --  146*  --  148*   POTASSIUM 4.1  --   --  4.7  --  4.7  --  3.9   CHLORIDE 117*  --   --  116*  --  114*  --  113*   CO2 25  --   --  25  --  26  --  28   BUN 62*  --   --  62*  --  59*  --  51*   CR 1.11  --   --  1.26*  --  1.18  --  0.84   * 204* 201* 192*   < > 166*   < > 206*     < > = values in this interval not displayed.     Liver Function Tests  Recent Labs   Lab 10/26/21  0637 10/25/21  2113 10/25/21  1609 10/25/21  0825 10/25/21  0326 10/25/21  0326 10/24/21  2146 10/24/21  1626   AST 21 21 20 20   < > 22   < > 20   ALT 71* 84* 88* 101*   < > 111*   < > 139*   ALKPHOS 160* 157* 163* 174*   < > 175*   < > 172*   BILITOTAL 0.3 0.3 0.5 0.3   < > 0.3   < > 0.3   ALBUMIN 1.8* 1.6* 1.6* 1.6*   < > 1.7*   < > 1.5*   INR 1.26*  --  1.27*  --   --  1.23*  --  1.19*    < > = values in this interval not displayed.     Pancreatic Enzymes  No lab results found in last 7 days.  Coagulation Profile  Recent Labs   Lab 10/26/21  0637 10/25/21  1609 10/25/21  0326 10/24/21  1626 10/23/21  0344 10/22/21  1407   INR 1.26* 1.27* 1.23* 1.19*   < > 1.28*   PTT  --   --   --   --   --  43*    < > = values in this interval not displayed.         5. RADIOLOGY:   Recent Results (from the past 24 hour(s))   CT Chest/Abdomen/Pelvis w Contrast   Result Value    Radiologist flags Nonocclusive venous thrombosis of the low right (Urgent)    Narrative    EXAMINATION: CT CHEST/ABDOMEN/PELVIS W CONTRAST, 10/25/2021 12:15 PM    TECHNIQUE:  Helical CT images from the lung apices through the  symphysis pubis were obtained with contrast.  Coronal and sagittal  reformatted images were generated at a workstation for further  assessment.    CONTRAST:  99 ml Isovue 370.    COMPARISON: Chest CTA and CT abdomen and pelvis 10/22/2021, same-day  chest radiograph, right upper extremity venous ultrasound 10/19/2021    HISTORY: Sepsis    FINDINGS:  Images limited by motion artifact.    Lines and tubes: Endotracheal tube tip in the mid thoracic trachea.  Enteric tube tip in the stomach. Right upper extremity PICC tip at the  superior cavoatrial junction. Mediastinal drain tips overlying the  arch. Bilateral chest tubes in similar position with right inferior  chest tube tip in similar position in the right 8th-9th intercostal  space,  not definitively in the right pleural space. Watson catheter  within the bladder.    Lungs: Postoperative changes of bilateral lung transplantation. Trace  right hydropneumothorax. No left pneumothorax. Small left pleural  effusion. Associated compressive atelectasis bilaterally. Decreased  consolidative opacities in the posterolateral left upper lobe and  bilateral lung bases. Diffuse bronchovascular thickening.  Heart and mediastinum: Trace dependent debris in the trachea. Central  tracheobronchial tree is otherwise patent. Heart size is normal. No  significant pericardial effusion. Mild coronary artery calcifications.  Thoracic vasculature: Normal caliber thoracic aorta. Normal caliber  pulmonary artery. Nonocclusive thrombosis of the low right internal  jugular vein.  Lymph nodes: No suspicious lymphadenopathy.  Thyroid: No suspicious nodules.    Liver: Unchanged subcentimeter hypodensity in the right hepatic lobe  (series 3, image 263), too small to characterize. No suspicious  lesions. Portal veins appear patent.  Gallbladder: No cholelithiasis or evidence of acute cholecystitis. No  intra- or extra-hepatic biliary ductal dilatation.  Spleen: Unremarkable.  Pancreas: Similar density at the pancreaticoduodenal groove, otherwise  unremarkable.  Adrenal glands: No discrete nodules.  Kidneys: No hydronephrosis. No nephrolithiasis. No suspicious mass.  Bladder / Pelvic organs: Bladder is decompressed with Watson. No pelvic  mass.  Bowel: Hyperdense colonic contents. No evidence of obstruction. The  appendix is unremarkable. Colonic diverticulosis without evidence of  acute diverticulitis.  Lymph nodes: No suspicious lymphadenopathy.  Peritoneum / Retroperitoneum: No pneumoperitoneum. No organized fluid  collections.  Abdominal vasculature: Major vascular structures of the abdomen are  patent and normal in caliber.    Bones and soft tissues: Degenerative changes of the visualized spine.  No acute osseous abnormalities  or suspicious bony lesions.  Subcutaneous and intramuscular emphysema along the anterior chest wall  and inferior neck. Clamshell sternotomy wires are intact. Trace  anasarca.      Impression    IMPRESSION:   1. Decreased consolidative opacities in the posterolateral left upper  lobe and bilateral lung bases, which may represent improving infection  and/or atelectasis.  2. Nonocclusive venous thrombosis of the low right internal jugular  vein.  3. Decreased trace right hydropneumothorax and small left pleural  effusion.  4. Support devices are in similar position, including the right  inferior chest tube tip near the 8th-9th intercostal space, not  definitively in the pleural space.   5. Decreased anterior chest subcutaneous/intramuscular emphysema.  6. The remainder of the exam is not significantly changed since  10/22/2021.    [Urgent Result: Nonocclusive venous thrombosis of the low right  internal jugular vein.]    Finding was identified on 10/25/2021 12:58 PM.     Dr. Elias was contacted by Dr. Llamas at 10/25/2021 1:38 PM and  verbalized understanding of the urgent finding.     I have personally reviewed the examination and initial interpretation  and I agree with the findings.    JOSEE ROMERO MD         SYSTEM ID:  [id number]   XR Feeding Tube Placement    Narrative    Feeding tube placement: 10/25/2021 3:17 PM    Comparison: CT 10/25/2021. Radiograph 10/23/2021.    Indication: Feeding tube placement    Fluoroscopy time: 2.3 minutes    Technique: After injection of Xylocaine gel into the left nostril, a  feeding tube was advanced under fluoroscopic guidance.    Findings: The feeding tube was advanced with the tip in the third  portion of the duodenum. A small amount of barium was injected to  demonstrate placement within the small bowel. The feeding tube was  then flushed with normal saline. The feeding tube was secured using  tape on the nasal bridge and Tegaderm on the cheek. Small amount of  contrast  overlying the proximal duodenum may represent a small  diverticulum containing contrast when correlated to prior CT and  radiograph.      Impression    Impression: Uncomplicated feeding tube placement with tip in the third  portion of the duodenum.    I, KAMLA SHIN MD, attest that I was immediately available to  provide guidance and assistance during the entirety of the procedure.  Radiologist was not present directly for tube placement as was  performed portable in the ICU.    I have personally reviewed the examination and initial interpretation  and I agree with the findings.    KAMLA SHIN MD         SYSTEM ID:  [id number]   XR Chest Port 1 View    Narrative    XR CHEST PORT 1 VIEW  10/25/2021 6:09 PM      HISTORY: s/p chest tube pull    COMPARISON: Chest x-ray 10/25/2021.    FINDINGS:   Portable AP 20 degree upright chest x-ray. Postsurgical changes of the  chest. Clamshell sternotomy wires are intact. Endotracheal tube tip  terminates over the low trachea. Enteric orogastric tubes are  partially visualized. Mediastinal drain. Bibasilar chest tubes.  Interval removal of the apically directed chest tubes.    Trachea is midline. Cardiac silhouette is enlarged. Pulmonary  vasculature is indistinct. Persistent bilateral perihilar interstitial  opacities. Small left pleural effusion with increased left basilar  opacities. No appreciable pneumothorax.    Osseous structures are intact. Visualized soft tissues and upper  abdomen are unremarkable.      Impression    IMPRESSION:   1. Interval removal of apically directed chest tubes. No appreciable  pneumothorax.  2. Cardiomegaly with persistent bilateral interstitial opacities  suggestive for pulmonary edema versus atelectasis.  3. Small left pleural effusion with increased left basilar atelectasis  versus consolidation.  4. Additional lines and tubes in stable position.    I have personally reviewed the examination and initial interpretation  and I agree  with the findings.    ANUPAM GASTON DO         SYSTEM ID:  [id number]   CT Head w/o Contrast    Narrative    EXAM: CT HEAD W/O CONTRAST  LOCATION: [facility]  DATE/TIME: 10/25/2021 9:44 PM    INDICATION: Code Stroke to evaluate for potential thrombolysis and thrombectomy.  Stopped opening eyes to speak, unresponsive, no longer has cough when suctioned. Possible stroke/cerebral infarct. Confusion. Altered mental status.  COMPARISON: MRI brain 10/23/2021  TECHNIQUE: Routine CT Head without IV contrast. Multiplanar reformats. Dose reduction techniques were used.    FINDINGS:  Motion and streak artifact limits aspects of exam.    INTRACRANIAL CONTENTS:   This exam is significantly limited by motion and streak artifact. MRI brain 10/23/2021 demonstrated multiple acute/subacute infarcts throughout the bilateral cerebral hemispheres and left cerebellum. Some of these acute/subacute infarcts demonstrated   microhemorrhage on SWI.    Left posterior parietal lobe acute/subacute infarct is identified. Remaining infarcts are not identified. It is possible that a component of the abnormal signal on MRI may of been attributed to posterior reversible encephalopathy syndrome.    No CT evidence for a new cortical hypoattenuation to indicate a new acute infarct on current exam.    No definite acute intracranial hemorrhage given artifact. No midline shift. No hydrocephalus.    Mild global brain volume loss.    VISUALIZED ORBITS/SINUSES/MASTOIDS: No intraorbital abnormality. No paranasal sinus mucosal disease. Bilateral mastoid air cells extensively opacified.    BONES/SOFT TISSUES: No acute abnormality. Nasogastric tube.      Impression    IMPRESSION:  This exam is significantly limited by motion and streak artifact.     MRI brain 10/23/2021 demonstrated multiple acute/subacute infarcts throughout the bilateral cerebral hemispheres and left cerebellum. Some of these acute/subacute infarcts  demonstrated microhemorrhage on SWI.    Left posterior parietal lobe acute/subacute infarct is identified on current exam. Remaining previously seen infarcts are not identified possibly obscured by artifact. It is possible that a component of the abnormal signal on MRI may of been attributed   to posterior reversible encephalopathy syndrome in addition to acute/subacute infarcts.    No CT evidence for a new cortical hypoattenuation to indicate a new acute infarct on current exam.    No definite acute intracranial hemorrhage given artifact.     No midline shift.     Dr. Kirstin Leos was notified by Dr Lobito Rose at  10:20 PM 10/25/2021.   CTA Head Neck with Contrast    Narrative    EXAM: CTA  HEAD NECK WITH CONTRAST  LOCATION: [facility]  DATE/TIME: 10/25/2021 9:44 PM    INDICATION: Code Stroke to evaluate for potential thrombolysis and thrombectomy. Confusion. Altered mental status. Stopped opening eyes to speak, unresponsive, no longer has cough when suctioned. Stroke/cerebral infarct suspected.  COMPARISON: None.  CONTRAST: iopamidol (ISOVUE-370) solution 75 mL  TECHNIQUE: Head and neck CT angiogram with IV contrast. Axial helical CT images of the head and neck vessels obtained during the arterial phase of intravenous contrast administration. Axial 2D reconstructed images and multiplanar 3D MIP reconstructed   images of the head and neck vessels were performed by the technologist. Dose reduction techniques were used. All stenosis measurements made according to NASCET criteria unless otherwise specified.    FINDINGS:   Motion and streak artifact limits aspects of exam.    HEAD CTA:  ANTERIOR CIRCULATION: No significant stenosis/occlusion, aneurysm, or AVM/AVF. Standard [place] anatomy.    POSTERIOR CIRCULATION: No significant stenosis/occlusion, aneurysm, or AVM/AVF. Dominant left and smaller right vertebral artery contribute to a normal basilar artery.      DURAL VENOUS SINUSES: Not well evaluated on a technical basis.      NECK CTA:  RIGHT CAROTID: No significant stenosis/occlusion. No dissection.    LEFT CAROTID: No significant stenosis/occlusion. No dissection.    VERTEBRAL ARTERIES:  Right vertebral artery mild stenosis. Left vertebral artery origin severe high-grade stenosis.    Remaining bilateral extradural vertebral arteries demonstrate no significant stenosis/occlusion. No dissection.      Dominant left and smaller right vertebral arteries.    AORTIC ARCH: Suboptimally visualized on this exam.    ARTERIAL PLAQUE: Scattered calcified/noncalcified plaque within the arterial system.    NONVASCULAR STRUCTURES:   Scattered foci of air within the venous system nonspecific likely iatrogenic. Endotracheal tube. Nasogastric tube. Left upper lobe posterior segment dependent patchy opacities. Degenerative changes noted within the spine.        Impression     IMPRESSION:   HEAD CTA:   1.  No intracranial arterial large vessel occlusion.  2.  No significant stenosis/occlusion.    NECK CTA:  1.  Right vertebral artery mild stenosis.   2.  Left vertebral artery origin severe high-grade stenosis.  3.  Otherwise, no significant stenosis/occlusion. No dissection.    Dr. Kirstin Leos was notified by Dr Lobito Rose at  10:35 PM 10/25/2021.        =========================================    Critical Care Services Progress Note:     Edson Thornton remains critically ill with mental status changes and post-operative respiratory failure     I personally examined and evaluated the patient today.   The patient s prognosis today is tentative  I have evaluated all laboratory values and imaging studies from the past 24 hours.  Key findings and decisions made today included a code stroke was again called overnight, given his worsening a CT was performed and reviewed, Brain MRI is planned for today to rule out PRES as a potential cause of his diffuse encephalopathy. Once the MRI is  obtained will continue to work up cause of his strokes.  I personally managed the ventilator, sedation, pain control and analgesia, and metabolic abnormalities.   Consults ongoing and ordered are none  Procedures that will happen today are: bronchoscopy  All treatments were placed at my direction.  I formulated today s plan with the house staff team or resident(s) and agree with the findings and plan in the associated note.       The above plans and care have been discussed with spouse and all questions and concerns were addressed.     I spent a total of 44 minutes (excluding procedure time) personally providing and directing critical care services at the bedside and on the critical care unit for Edson Thornton.        Ronnie Ron MD

## 2021-10-26 NOTE — PROGRESS NOTES
Pulmonary Medicine  Cystic Fibrosis - Lung Transplant Team  Progress Note  10/26/2021       Patient: Edson Thornton  MRN: [id number]  : 1965 (age 56 year old)  Transplant: 10/16/2021 (Lung), POD#10  Admission date: 2021    Assessment & Plan:     Edson Thornton is a 56 year old male with a PMH significant for NSIP/ILD, bronchiectasis, moderate PH, RA, SALTY, chronic HSV infection, hypogammaglobulinemia, steroid-induced diabetes, hypothyroidism, PFO, HTN, HLD, duodenal anomaly, anxiety, and depression.  Admitted on 21 from OSH for acute on chronic respiratory failure 2/2 ILD exacerbation, now s/p BSLT on 10/16/21.  Surgery relatively uncomplicated but pt. with low cardiac index and PGD immediately post-op.  Caroline weaned off 10/22, remains intubated.  Post-op course complicated by encephalopathy and diffuse weakness, acute to subacute CVA, afib with RVR, BRENNAN, GI bleed due to NJ/OG trauma, Candidemia, and recurring fevers.     Today's recommendations:   - Plan for trach and PEG/J tube with thoracic surgery this week (TBD)  - Recommend to keep at least one chest tube given empyema  - Bronchoscopy today for airway inspection and cultures (Dr. Pereira to perform)  - Tacrolimus level today to trend, dose decreased with start of fluconazole, daily levels to trend  - Prednisone taper due 10/31 (ordered)  - CMV and EBV   - Coccidioides Ag   - Following pending respiratory and pleural cultures  - Consider LP given recurrent fevers  - Low threshold for empiric ABX coverage for positive gram stain, defer to transplant ID (defer for now)  - Transition from micafungin to fluconazole with duration TBD pending clinical course (ordered)  - Repeat IgG   - DSA from 10/23 invalid with recent IVIG, repeat   - Donor risk labs 11/15  - Repeat brain MRI per neurology, consider contrast to evaluate for PRES     S/p BSLT for ILD:  Acute hypoxic/hypercapneic respiratory failure:   Pulmonary  edema:  Subcutaneous emphysema: Unfortunately had not received vaccination for flu, PNA, or COVID-19 PTA.  Explant pathology with NSIP, no malignancy.  PGD 2-3.  Weaned off paralytic 10/19 (for vent dyssynchrony), pressors 10/21 (now hypertensive), and Caroline 10/22.  Initial difficulty weaning sedation given agitation then with neurological findings as below.  CXR initially post-op with pulmonary edema, aggressively diuresed.  Recurring fevers post-op, see ID section below.  Bronch 10/20 with tan secretions to LLL, repeat 10/23 with frothy clear secretions in right TB tree.  Chest CT (10/25) with interval improvement in consolidative opacities to BLL and KARI with trace right hydroPTX (personally reviewed).  Remains intubated and with recurrent concern for CVA as below.  - Nebs: levalbuterol and Mucomyst QID  - Aggressive pulmonary toilet with chest physiotherapy QID  - Ventilator management and PST per ICU team  - Plan for trach and PEG/J tube with thoracic surgery when able, tube feeds via NJ for now  - Chest tube management per CVTS, recommend to keep at least one chest tube given empyema  - Agree with ongoing diuresis as tolerated  - CXR today with slight improvement in left pleural effusion and stable small right pleural effusion (personally reviewed)  - Bronchoscopy today for airway inspection and cultures (Dr. Pereira to perform)     Immunosuppression: s/p induction therapy with basiliximab 10/16 (and high dose IV steroid) and 10/20  - Tacrolimus 3 mg BID suspension (held 10/23-10/25 due to supratherapeutic levels).  Goal level 8-12.  Level today to trend, dose decreased to 1.5 mg BID with start of fluconazole (as below), continue daily levels.  - MMF 1500 mg BID (on PTA, AZA to be avoided given TPMT)  - Prednisone 15 mg BID with next taper on 10/31 (ordered) per lung transplant protocol:  Date AM dose (mg) PM dose (mg)   10/24/21 15 15   10/31/21 15 12.5   11/7/21 12.5 12.5   11/21/21 12.5 10   12/5/21 10 10    1/2/22 10 7.5   1/30/22 7.5 7.5   2/27/22 7.5 5   3/27/22 5 5   4/24/22 5 2.5      Prophylaxis:   - Bactrim for PJP ppx (10/25, held prior d/t BRENNAN)  - Nystatin for oral candidiasis ppx, 6 month course  - See below for serologies and viral ppx:    Donor Recipient Intervention   CMV status Negative Negative None, CMV monthly (ordered 11/16)   EBV status Positive Positive None, EBV monthly (ordered 11/16)   HSV status N/A Positive Acyclovir POD #1-30   (recent infection history)      ID: Concern for possible Strongyloides exposure pre-transplant s/p ivermectin x1 dose (9/17).  Donor and recipient cultures NGTD.  S/p IV ceftazidime/vancomycin for 48h per protocol and additional empiric ceftazidime 10/19-10/23 given recurrent fevers.  Bronch cultures 10/17 with Staph epi.  Cryptococcal Ag negative 10/23.    - Monthly Coccidioides Ag x12 months post-transplant per ID (urine and serum negative 10/17), due 11/17 (ordered)    Recurring fevers:  Fevers post-op, Tmax 101.7 POD #1.  Febrile with worsening leukocytosis again 10/25.  - Trach sputum culture (10/25) with GPC, following  - Pleural cultures (10/25) with 3+ WBC, following  - Consider LP given recurrent fevers  - Low threshold for empiric ABX coverage for positive gram stain, defer to transplant ID (defer for now)     Candidemia:  Candida on respiratory cultures:   Possible Candida empyema:  Noted on 1 of 2 blood cultures 10/20, repeat + blood culture 10/22 as below.  BDG fungitell positive (399) on 10/20.  Respiratory cultures with persistent Candida albicans (10/17, 10/20, 10/22).  TC 10/23 without evidence of endocarditis.   Ophthalmology consult 10/24 with benign dilated fundoscopic exam.  - Blood culture (10/25) with + yeast (1 of 2, venipuncture), following  - Repeat blood cultures (10/23, 10/25) NGTD  - Pleural cultures (10/25) with 2+ yeast, following  - Micafungin 100 mg daily (10/22), will transition today to fluconazole with duration TBD pending clinical  course  - Agree with line holiday as able     HSV: Chronic intermittent active infection pre-transplant with recent HSV infection: crusted lesions throughout left side of jaw, s/p 10 day treatment course of ACV through 10/9.  HSV PCR blood negative 10/17.  - ACV ppx as above (started POD #1 instead of POD #8 given HSV history and location)     Hypogammaglobulinemia: IgG previously low at 364 (9/7).  Noted at 265 at time of transplant, s/p IVIG with premedication 10/21.    - Repeat IgG 11/17 (ordered)     Positive cross match: Note that he received two doses of rituximab in June, which is likely contributing to cross match result.  DSA negative 10/17, repeat DSA 10/23 invalid with recent IVIG (as above).  - Repeat DSA 11/1 (ordered), monitoring q2 weeks     PHS risk criteria donor:  Additional labs required post-transplant (between 4-8 weeks post-op): Hepatitis B, Hepatitis C, and HIV by VISHAL (ordered 11/15).     SALTY: Noted pre-transplant.  Home CPAP 6-12 cm H2O.  - Resume BiPAP at night post-extubation     Other relevant problems being managed by primary team:     Acute to subacute CVA:   Encephalopathy and diffuse weakness: Stroke code 10/22 d/t limited movement of BLE, CT head with infarcts in the bilateral cerebral hemispheres and left cerebella hemisphere (presumed embolic), no acute intracranial hemorrhage.  MRI (10/23) with multifocal subacute infarcts within both cerebral hemispheres and left cerebellum.  EEG without seizures 10/22, ammonia normal.  DDx include surgery v embolic v infectious (see above regarding ID work up).  Heparin drip started 10/23.   Repeat stroke code 10/25 with marked decrease in responsiveness with sedation wean.  Pupils not equal (3 mm R, 2 mm L with extropia) and gag reflex initially absent.  CTA head without obvious new pathology.  - Repeat brain MRI per neurology, consider contrast to evaluate for PRES  - Heparin management per neurology and primary     Afib with RVR: Noted 10/18,  started on amiodarone drip and transitioned to PO 10/21.  Currently in SR.  Heparin drip started 10/23 as above.     BRENNAN, Improving: Baseline creatinine 0.7.  BRENNAN noted POD #1, UO also downtrending.  Improving with aggressive diuresis.     Elevated LFTs, Improving: Shock liver post-op.  Initial ALT//230 evening of surgery, then with marked increase to 1550/1246 POD #1.  Gradual improvement in ALT/AST, alk phos with persistent mild elevation.     GI bleed, Resolved: Hemoglobin dropped to 6.6 10/22, s/p 2 units pRBC.  OG tube with bloody output, EGD 10/23 noted NJ/OG tube trauma with scant oozing.  IV PPI BID.  Hemoglobin now stable.     We appreciate the excellent care provided by the CVICU and CVTS teams.  Recommendations communicated via in person rounding and this note.  Will continue to follow along closely, please do not hesitate to call with any questions or concerns.     Patient discussed with Dr. Pereira.    Renetta Holloway, DNP, APRN, CNP  Inpatient Nurse Practitioner  Pulmonary CF/Transplant     Subjective & Interval History:     Sedated with propofol and fentanyl yesterday, noted to be unresponsive with sedation wean yesterday evening with impaired gag reflex.  Stroke code called 2100 with slightly unequal pupils and exotropia on the left.  Head imaging unrevealing for new pathology per neuro.  Sedation and heparin drip off since yesterday evening.  Remains on full vent support, overbreathing the vent (low 30's, rate set at 18), no PST documented yesterday.  Intermittent thick secretions via ETT per nursing.  Large loose BM x1 daily, NJ placed yesterday and TF titrating up to goal.  UO stable.  BG intermittently elevated.    Review of Systems:     ROS as above, otherwise severely limited by intubation and encephalopathy.    Physical Exam:     Vital signs:  Temp: 100.4  F (38  C) Temp src: Bladder BP: 138/80 Pulse: 95   Resp: 30 SpO2: 97 % O2 Device: Mechanical Ventilator Oxygen Delivery: 15 LPM Height:  "162.6 cm (5' 4\") Weight: 73.8 kg (162 lb 11.2 oz)  I/O:     Intake/Output Summary (Last 24 hours) at 10/26/2021 0716  Last data filed at 10/26/2021 0700  Gross per 24 hour   Intake 1505.16 ml   Output 2168 ml   Net -662.84 ml     Constitutional: Lying supine, in mild distress.   HEENT: Eyes with pink conjunctivae, anicteric.  Pupils 3-4 mm and responsive equally, no exotropia.  Orally intubated.  PULM: Good air flow bilaterally.  Loose coarse crackles t/o, no rhonchi, no wheezes.  Mildly labored breathing on CMV 18/400/5/55 (overbreathing at rate of 32).  CV: Normal S1 and S2.  RRR.  No murmur, gallop, or rub.  No peripheral edema.   ABD: NABS, soft, nontender, nondistended.    MSK: + muscle wasting.   NEURO: Not responsive to painful stimuli.   SKIN: Warm, dry.  No rash on limited exam.   PSYCH: Mood calm.     Lines, Drains, and Devices:  Peripheral IV 10/22/21 Left;Posterior Lower forearm (Active)   Site Assessment [facility] 10/26/21 0400   Line Status Saline locked 10/26/21 0400   Dressing Intervention New dressing  10/25/21 1600   Phlebitis Scale 0-->no symptoms 10/26/21 0400   Infiltration Scale 0 10/26/21 0400   Number of days: 4       Peripheral IV 10/25/21 Right;Anterior;Lateral Lower forearm (Active)   Site Assessment [facility] 10/26/21 0400   Line Status Saline locked 10/26/21 0400   Dressing Intervention New dressing  10/25/21 1600   Phlebitis Scale 0-->no symptoms 10/26/21 0400   Infiltration Scale 0 10/26/21 0400   Infiltration Site Treatment Method  None 10/25/21 1600   If infiltrated, was a vesicant infusing? No 10/25/21 1600   Number of days: 1       Peripheral IV 10/25/21 Anterior;Distal;Right Upper arm (Active)   Site Assessment [facility] 10/26/21 0400   Line Status Saline locked 10/26/21 0400   Phlebitis Scale 0-->no symptoms 10/26/21 0400   Infiltration Scale 0 10/26/21 0400   Number of days: 1     Data:     LABS    CMP:   Recent Labs   Lab 10/26/21  0637 10/26/21  0605 10/26/21  0145 10/25/21  2113 10/25/21  2102 " 10/25/21  1941 10/25/21  1609 10/25/21  1043 10/25/21  0825 10/25/21  0326 10/25/21  0326 10/24/21  1756 10/24/21  1626 10/24/21  0514 10/24/21  0409   *  --   --  146*  --   --  146*  --  148*   < > 145*   < > 146*   < > 145*   POTASSIUM 4.1  --   --  4.7  --   --  4.7  --  3.9   < > 4.3   < > 4.1   < > 4.2   CHLORIDE 117*  --   --  116*  --   --  114*  --  113*   < > 110*   < > 111*   < > 111*   CO2  --   --   --  25  --   --  26  --  28  --  27   < > 27   < > 30   ANIONGAP  --   --   --  5  --   --  6  --  7  --  8   < > 8   < > 4   GLC  --  204* 201* 192* 201*   < > 166*   < > 206*   < > 219*   < > 185*   < > 123*  106*   BUN  --   --   --  62*  --   --  59*  --  51*  --  57*   < > 63*   < > 61*   CR  --   --   --  1.26*  --   --  1.18  --  0.84  --  0.93   < > 1.02   < > 1.06   GFRESTIMATED  --   --   --  63  --   --  69  --  >90  --  >90   < > 82   < > 78   ERIK  --   --   --  8.6  --   --  8.7  --  8.9  --  9.0   < > 8.7   < > 8.6   MAG  --   --   --   --   --   --  1.8  --   --   --  1.7  --  1.7  --  1.9   PHOS  --   --   --   --   --   --  3.0  --  2.6  --  2.6  --  3.0   < > 3.3   PROTTOTAL  --   --   --  5.6*  --   --  5.7*  --  5.7*  --  6.1*   < > 5.9*   < > 5.6*   ALBUMIN  --   --   --  1.6*  --   --  1.6*  --  1.6*  --  1.7*   < > 1.5*   < > 1.4*   BILITOTAL  --   --   --  0.3  --   --  0.5  --  0.3  --  0.3   < > 0.3   < > 0.2   ALKPHOS  --   --   --  157*  --   --  163*  --  174*  --  175*   < > 172*   < > 160*   AST  --   --   --  21  --   --  20  --  20  --  22   < > 20   < > 20   ALT  --   --   --  84*  --   --  88*  --  101*  --  111*   < > 139*   < > 153*    < > = values in this interval not displayed.     CBC:   Recent Labs   Lab 10/25/21  0326 10/24/21  0410 10/23/21  0344 10/22/21  2013 10/22/21  1407 10/22/21  0958   WBC 32.8* 21.4* 21.3*  --   --  20.7*   RBC 3.53* 3.24* 3.21*  --   --  2.96*   HGB 10.6* 9.8* 9.6* 9.0*   < > 9.1*   HCT 33.1* 30.5* 30.2*  --   --  28.0*   MCV 94 94  94  --   --  95   MCH 30.0 30.2 29.9  --   --  30.7   MCHC 32.0 32.1 31.8  --   --  32.5   RDW 15.9* 16.4* 17.2*  --   --  16.1*    201 132*  --   --  112*    < > = values in this interval not displayed.       INR:   Recent Labs   Lab 10/26/21  0637 10/25/21  1609 10/25/21  0326 10/24/21  1626   INR 1.26* 1.27* 1.23* 1.19*       Glucose:   Recent Labs   Lab 10/26/21  0605 10/26/21  0145 10/25/21  2113 10/25/21  2102 10/25/21  1941 10/25/21  1609   * 201* 192* 201* 186* 166*       Blood Gas:   Recent Labs   Lab 10/26/21  0522 10/25/21  2113 10/25/21  1136 10/24/21  0010 10/23/21  2108 10/23/21  2016 10/23/21  1543   PHV  --  7.47*  --   --  7.42  --  7.44*   PCO2V  --  36*  --   --  45  --  46   PO2V  --  78*  --   --  36  --  36   HCO3V  --  27  --   --  29*  --  31*   LORI  --  2.9*  --   --  4.1*  --  6.5*   O2PER 55 55 50   < > 50   < > 50  50    < > = values in this interval not displayed.       Culture Data No results for input(s): CULT in the last 168 hours.    Virology Data:   Lab Results   Component Value Date    FLUAH1 Negative 10/17/2021    FLUAH3 Negative 10/17/2021    KR7802 Negative 10/17/2021    IFLUB Negative 10/17/2021    RSVA Negative 10/17/2021    RSVB Negative 10/17/2021    PIV1 Negative 10/17/2021    PIV2 Negative 10/17/2021    PIV3 Negative 10/17/2021    HMPV Negative 10/17/2021    HRVS Negative 10/17/2021    ADVBE Negative 10/17/2021    ADVC Negative 10/17/2021       Historical CMV results (last 3 of prior testing):  No results found for: CMVQNT  No results found for: CMVLOG    Urine Studies    Recent Labs   Lab Test 10/25/21  1507 10/22/21  1641   URINEPH 5.5 7.5*   NITRITE Negative Negative   LEUKEST Negative Negative   WBCU 2 3       Most Recent [facility] Pulmonary Function Testing (FVC/FEV1 only)  No results found for: 20002  No results found for: 20003  No results found for: 20015  No results found for: 20016    IMAGING    Recent Results (from the past 48 hour(s))   XR Chest  Port 1 View    Narrative    XR CHEST PORT 1 VIEW  10/24/2021 10:11 AM      HISTORY: s/p lung transplant    COMPARISON: Chest x-ray 10/23/2021.    FINDINGS:   Portable AP 35 degree upright chest x-ray. Postsurgical bilateral lung  transplant changes. Clamshell sternotomy wires are intact. Skin  staples project over the lower hemithoraces. Bilateral chest tubes and  mediastinal drain. Right upper cavity PICC tip terminates over the  right atrium.    Trachea is midline. Cardiac silhouette is obscured. Bony vasculature  is indistinct. Mild bilateral perihilar interstitial opacities. No  significant pleural effusion or pneumothorax.    Osseous structures are intact. Visualized soft tissues are normal.      Impression    IMPRESSION:   1.  Stable postsurgical bilateral lung transplant changes with mild  perihilar interstitial opacities suggestive for pulmonary edema versus  atelectasis.  2.  Support devices in stable position. No appreciable pneumothorax.    I have personally reviewed the examination and initial interpretation  and I agree with the findings.    FREDDY LEAHY MD         SYSTEM ID:  [id number]   XR Chest Port 1 View    Narrative    EXAM: XR CHEST PORT 1 VIEW  10/25/2021 1:30 AM     HISTORY:  s/p lung transplant       COMPARISON:  radiographs 10/24/2021, 10/23/2021    TECHNIQUE: Portable AP radiograph of the chest.    FINDINGS:   Endotracheal tube tip projects over the midthoracic trachea. Enteric  tube courses below the diaphragm and out of the field-of-view. Right  upper extremity PICC tip projects over the high right atrium. Stable  positioning of bilateral pleural chest tubes and mediastinal chest  tube. Postsurgical changes of bilateral lung transplantation.    The trachea is midline. Stable cardiac silhouette. Mixed interstitial  and airspace opacities laterally are not significantly changed. Trace  bilateral pleural effusions. No appreciable pneumothorax.      Impression    IMPRESSION: Status post  bilateral lung transplant:  1. No significant interval change and stable support devices.  2. Small left pleural effusion. Bibasilar atelectasis. Stable  bilateral interstitial and airspace opacities.  3. No pneumothorax.    I have personally reviewed the examination and initial interpretation  and I agree with the findings.    JOSEE ROMERO MD         SYSTEM ID:  [id number]   CT Chest/Abdomen/Pelvis w Contrast   Result Value    Radiologist flags Nonocclusive venous thrombosis of the low right (Urgent)    Narrative    EXAMINATION: CT CHEST/ABDOMEN/PELVIS W CONTRAST, 10/25/2021 12:15 PM    TECHNIQUE:  Helical CT images from the lung apices through the  symphysis pubis were obtained with contrast.  Coronal and sagittal  reformatted images were generated at a workstation for further  assessment.    CONTRAST:  99 ml Isovue 370.    COMPARISON: Chest CTA and CT abdomen and pelvis 10/22/2021, same-day  chest radiograph, right upper extremity venous ultrasound 10/19/2021    HISTORY: Sepsis    FINDINGS:  Images limited by motion artifact.    Lines and tubes: Endotracheal tube tip in the mid thoracic trachea.  Enteric tube tip in the stomach. Right upper extremity PICC tip at the  superior cavoatrial junction. Mediastinal drain tips overlying the  arch. Bilateral chest tubes in similar position with right inferior  chest tube tip in similar position in the right 8th-9th intercostal  space, not definitively in the right pleural space. Watson catheter  within the bladder.    Lungs: Postoperative changes of bilateral lung transplantation. Trace  right hydropneumothorax. No left pneumothorax. Small left pleural  effusion. Associated compressive atelectasis bilaterally. Decreased  consolidative opacities in the posterolateral left upper lobe and  bilateral lung bases. Diffuse bronchovascular thickening.  Heart and mediastinum: Trace dependent debris in the trachea. Central  tracheobronchial tree is otherwise patent. Heart size is  normal. No  significant pericardial effusion. Mild coronary artery calcifications.  Thoracic vasculature: Normal caliber thoracic aorta. Normal caliber  pulmonary artery. Nonocclusive thrombosis of the low right internal  jugular vein.  Lymph nodes: No suspicious lymphadenopathy.  Thyroid: No suspicious nodules.    Liver: Unchanged subcentimeter hypodensity in the right hepatic lobe  (series 3, image 263), too small to characterize. No suspicious  lesions. Portal veins appear patent.  Gallbladder: No cholelithiasis or evidence of acute cholecystitis. No  intra- or extra-hepatic biliary ductal dilatation.  Spleen: Unremarkable.  Pancreas: Similar density at the pancreaticoduodenal groove, otherwise  unremarkable.  Adrenal glands: No discrete nodules.  Kidneys: No hydronephrosis. No nephrolithiasis. No suspicious mass.  Bladder / Pelvic organs: Bladder is decompressed with Watson. No pelvic  mass.  Bowel: Hyperdense colonic contents. No evidence of obstruction. The  appendix is unremarkable. Colonic diverticulosis without evidence of  acute diverticulitis.  Lymph nodes: No suspicious lymphadenopathy.  Peritoneum / Retroperitoneum: No pneumoperitoneum. No organized fluid  collections.  Abdominal vasculature: Major vascular structures of the abdomen are  patent and normal in caliber.    Bones and soft tissues: Degenerative changes of the visualized spine.  No acute osseous abnormalities or suspicious bony lesions.  Subcutaneous and intramuscular emphysema along the anterior chest wall  and inferior neck. Clamshell sternotomy wires are intact. Trace  anasarca.      Impression    IMPRESSION:   1. Decreased consolidative opacities in the posterolateral left upper  lobe and bilateral lung bases, which may represent improving infection  and/or atelectasis.  2. Nonocclusive venous thrombosis of the low right internal jugular  vein.  3. Decreased trace right hydropneumothorax and small left pleural  effusion.  4. Support  devices are in similar position, including the right  inferior chest tube tip near the 8th-9th intercostal space, not  definitively in the pleural space.   5. Decreased anterior chest subcutaneous/intramuscular emphysema.  6. The remainder of the exam is not significantly changed since  10/22/2021.    [Urgent Result: Nonocclusive venous thrombosis of the low right  internal jugular vein.]    Finding was identified on 10/25/2021 12:58 PM.     Dr. Elias was contacted by Dr. Llamas at 10/25/2021 1:38 PM and  verbalized understanding of the urgent finding.     I have personally reviewed the examination and initial interpretation  and I agree with the findings.    JOSEE ROMERO MD         SYSTEM ID:  [id number]   XR Feeding Tube Placement    Narrative    Feeding tube placement: 10/25/2021 3:17 PM    Comparison: CT 10/25/2021. Radiograph 10/23/2021.    Indication: Feeding tube placement    Fluoroscopy time: 2.3 minutes    Technique: After injection of Xylocaine gel into the left nostril, a  feeding tube was advanced under fluoroscopic guidance.    Findings: The feeding tube was advanced with the tip in the third  portion of the duodenum. A small amount of barium was injected to  demonstrate placement within the small bowel. The feeding tube was  then flushed with normal saline. The feeding tube was secured using  tape on the nasal bridge and Tegaderm on the cheek. Small amount of  contrast overlying the proximal duodenum may represent a small  diverticulum containing contrast when correlated to prior CT and  radiograph.      Impression    Impression: Uncomplicated feeding tube placement with tip in the third  portion of the duodenum.    I, KAMLA SHIN MD, attest that I was immediately available to  provide guidance and assistance during the entirety of the procedure.  Radiologist was not present directly for tube placement as was  performed portable in the ICU.    I have personally reviewed the examination and  initial interpretation  and I agree with the findings.    KAMLA SHIN MD         SYSTEM ID:  [id number]   XR Chest Port 1 View    Narrative    XR CHEST PORT 1 VIEW  10/25/2021 6:09 PM      HISTORY: s/p chest tube pull    COMPARISON: Chest x-ray 10/25/2021.    FINDINGS:   Portable AP 20 degree upright chest x-ray. Postsurgical changes of the  chest. Clamshell sternotomy wires are intact. Endotracheal tube tip  terminates over the low trachea. Enteric orogastric tubes are  partially visualized. Mediastinal drain. Bibasilar chest tubes.  Interval removal of the apically directed chest tubes.    Trachea is midline. Cardiac silhouette is enlarged. Pulmonary  vasculature is indistinct. Persistent bilateral perihilar interstitial  opacities. Small left pleural effusion with increased left basilar  opacities. No appreciable pneumothorax.    Osseous structures are intact. Visualized soft tissues and upper  abdomen are unremarkable.      Impression    IMPRESSION:   1. Interval removal of apically directed chest tubes. No appreciable  pneumothorax.  2. Cardiomegaly with persistent bilateral interstitial opacities  suggestive for pulmonary edema versus atelectasis.  3. Small left pleural effusion with increased left basilar atelectasis  versus consolidation.  4. Additional lines and tubes in stable position.    I have personally reviewed the examination and initial interpretation  and I agree with the findings.    ANUPAM GASTON DO         SYSTEM ID:  [id number]   CT Head w/o Contrast    Narrative    EXAM: CT HEAD W/O CONTRAST  LOCATION: [facility]  DATE/TIME: 10/25/2021 9:44 PM    INDICATION: Code Stroke to evaluate for potential thrombolysis and thrombectomy.  Stopped opening eyes to speak, unresponsive, no longer has cough when suctioned. Possible stroke/cerebral infarct. Confusion. Altered mental status.  COMPARISON: MRI brain 10/23/2021  TECHNIQUE: Routine CT Head without  IV contrast. Multiplanar reformats. Dose reduction techniques were used.    FINDINGS:  Motion and streak artifact limits aspects of exam.    INTRACRANIAL CONTENTS:   This exam is significantly limited by motion and streak artifact. MRI brain 10/23/2021 demonstrated multiple acute/subacute infarcts throughout the bilateral cerebral hemispheres and left cerebellum. Some of these acute/subacute infarcts demonstrated   microhemorrhage on SWI.    Left posterior parietal lobe acute/subacute infarct is identified. Remaining infarcts are not identified. It is possible that a component of the abnormal signal on MRI may of been attributed to posterior reversible encephalopathy syndrome.    No CT evidence for a new cortical hypoattenuation to indicate a new acute infarct on current exam.    No definite acute intracranial hemorrhage given artifact. No midline shift. No hydrocephalus.    Mild global brain volume loss.    VISUALIZED ORBITS/SINUSES/MASTOIDS: No intraorbital abnormality. No paranasal sinus mucosal disease. Bilateral mastoid air cells extensively opacified.    BONES/SOFT TISSUES: No acute abnormality. Nasogastric tube.      Impression    IMPRESSION:  This exam is significantly limited by motion and streak artifact.     MRI brain 10/23/2021 demonstrated multiple acute/subacute infarcts throughout the bilateral cerebral hemispheres and left cerebellum. Some of these acute/subacute infarcts demonstrated microhemorrhage on SWI.    Left posterior parietal lobe acute/subacute infarct is identified on current exam. Remaining previously seen infarcts are not identified possibly obscured by artifact. It is possible that a component of the abnormal signal on MRI may of been attributed   to posterior reversible encephalopathy syndrome in addition to acute/subacute infarcts.    No CT evidence for a new cortical hypoattenuation to indicate a new acute infarct on current exam.    No definite acute intracranial hemorrhage given  artifact.     No midline shift.     Dr. Kirstin Leos was notified by Dr Lobito Rose at  10:20 PM 10/25/2021.   CTA Head Neck with Contrast    Narrative    EXAM: CTA  HEAD NECK WITH CONTRAST  LOCATION: [facility]  DATE/TIME: 10/25/2021 9:44 PM    INDICATION: Code Stroke to evaluate for potential thrombolysis and thrombectomy. Confusion. Altered mental status. Stopped opening eyes to speak, unresponsive, no longer has cough when suctioned. Stroke/cerebral infarct suspected.  COMPARISON: None.  CONTRAST: iopamidol (ISOVUE-370) solution 75 mL  TECHNIQUE: Head and neck CT angiogram with IV contrast. Axial helical CT images of the head and neck vessels obtained during the arterial phase of intravenous contrast administration. Axial 2D reconstructed images and multiplanar 3D MIP reconstructed   images of the head and neck vessels were performed by the technologist. Dose reduction techniques were used. All stenosis measurements made according to NASCET criteria unless otherwise specified.    FINDINGS:   Motion and streak artifact limits aspects of exam.    HEAD CTA:  ANTERIOR CIRCULATION: No significant stenosis/occlusion, aneurysm, or AVM/AVF. Standard [place] anatomy.    POSTERIOR CIRCULATION: No significant stenosis/occlusion, aneurysm, or AVM/AVF. Dominant left and smaller right vertebral artery contribute to a normal basilar artery.     DURAL VENOUS SINUSES: Not well evaluated on a technical basis.      NECK CTA:  RIGHT CAROTID: No significant stenosis/occlusion. No dissection.    LEFT CAROTID: No significant stenosis/occlusion. No dissection.    VERTEBRAL ARTERIES:  Right vertebral artery mild stenosis. Left vertebral artery origin severe high-grade stenosis.    Remaining bilateral extradural vertebral arteries demonstrate no significant stenosis/occlusion. No dissection.      Dominant left and smaller right vertebral arteries.    AORTIC ARCH: Suboptimally  visualized on this exam.    ARTERIAL PLAQUE: Scattered calcified/noncalcified plaque within the arterial system.    NONVASCULAR STRUCTURES:   Scattered foci of air within the venous system nonspecific likely iatrogenic. Endotracheal tube. Nasogastric tube. Left upper lobe posterior segment dependent patchy opacities. Degenerative changes noted within the spine.        Impression     IMPRESSION:   HEAD CTA:   1.  No intracranial arterial large vessel occlusion.  2.  No significant stenosis/occlusion.    NECK CTA:  1.  Right vertebral artery mild stenosis.   2.  Left vertebral artery origin severe high-grade stenosis.  3.  Otherwise, no significant stenosis/occlusion. No dissection.    Dr. Kirstin Leos was notified by Dr Lobito Rose at  10:35 PM 10/25/2021.

## 2021-10-26 NOTE — CONSULTS
[facility]    Stroke Consult Note    Reason for Consult: Stroke Code     Chief Complaint: No chief complaint on file.      HPI  Mr. Edson Thornton is a 56 year old man with a history of A. fib, hypertension who recently underwent bilateral lung transplant on 10/16/2021.  Stroke alert was initially called 10/22 for decreased movement in lower extremities.  He had previously been moving all extremities he was found to have multiple scattered subacute ischemic strokes that appeared embolic in etiology.  Etiology was thought to be due to cardioembolic source or potentially embolic source from known pulmonary vein thrombosis, with perioperative event also on differential diagnosis.  Patient was also noted to be encephalopathic and video EEG monitoring was initiated, without clear subclinical seizures or nonconvulsive status epilepticus.  Patient was put on heparin drip.  He underwent stroke work-up including TC (with no vegetations or thrombus appreciated).  Video EEG was discontinued on 10/24.  Brain MRI obtained 10/23 confirming multiple embolic strokes in bilateral cerebral hemispheres prominently in the occipital region, is also microhemorrhage noted in the left occipital lobe.  On 10/25 he underwent CT chest abdomen pelvis and was noted to have a nonocclusive venous thrombosis of his right IJ.  He also has multiple toxic metabolic and infectious etiologies potential encephalopathy, including respiratory Candida    Another stroke code was called 10/25 for decreased responsiveness on exam.  Per bedside nursing he had previously opened eyes to voice and noxious stimuli, last charted at 4 PM on 10/25 (last known well) however when change of shift nurse assessed patient and he was found to not have any eye opening (spontaneous or to noxious) and he was noted to not have a cough with deep inline suctioning.  No movement of any extremities to noxious stim.  Heparin drip  paused.    On stroke resident exam patient has pupils right 3 mm, left 2 mm both reactive to light.  Corneal reflex present in both eyes to sterile saline drips.  Exotropia noted on left eye.  No gag.  No cough.  No movement of any extremities to noxious stim.    CT CTA obtained, and redemonstrated prior known strokes (although limited by artifact), without clear area of new or evolving infarct.  This was limited however due to streak artifact (particularly left frontal lobe).  Patient is not a candidate for thrombolytics due to being on a heparin drip, recent established strokes.  Not a candidate for thrombectomy due to lack of proximal large vessel occlusion.  She reports subsequently de-escalated.    Thrombolytic Treatment   Not given due to established infarct on imaging.    Endovascular Treatment  Not initiated due to absence of proximal vessel occlusion    Impression   #Established multifocal embolic strokes  #Encephalopathy    Mr. Thornton is a 56-year-old man with complex past medical history including bilateral lung transplant 9 days ago (and immunosuppression with tacro and prednisone) who is currently being followed by the stroke service.  He had a recurrent stroke called on 10/25 for new worsening of exam.  CT and CTA obtained and reassuring that there is no posterior circulation thrombus such as basilar artery occlusion.  No clear area of evolving new infarct, however it is possible that it is within a.  Too early to see on CT scan.  Would recommend follow-up with MRI imaging.  Additionally given concern for exam change as well as previously noted microhemorrhages on MRI would recommend holding heparin drip if medically feasible until repeat imaging obtained.  Discussed this with primary team (CVTS).  He has already undergone stroke work-up, and is already optimized for secondary stroke prevention standpoint.    Possibly confounding his worsening exam is infectious or toxic metabolic component.  Of note he  was febrile to 101.3, one of his pleural fluid cultures returned with 2+ yeast and 3+ leukocytes.      Recommendations  -MRI brain  -hold heparin gtt until obtained  -continue to treat underlying infections/metabolic derrangements    Patient Follow-up    - final recommendation pending work-up    Thank you for this consult. We will continue to follow.     The patient was discussed with Stroke Fellow, Dr. Aguilar.  The Stroke Staff is Dr. Albarran.    Kirstin Leos MD  Neurology Resident  *[phone]  ______________________________________________________    Clinically Significant Risk Factors Present on Admission                  Past Medical History   Past Medical History:   Diagnosis Date     BRENNAN (acute kidney injury) (H) 10/17/2021     Anxiety      Depression      HLD (hyperlipidemia)      HTN (hypertension)      Hypothyroidism      ILD (interstitial lung disease) (H)      SALTY on CPAP      Oxygen dependent     BL 4L since ~6/2021     Rheumatoid arthritis (H)     signs ~5/2020, dx 5/2021     S/P lung transplant (H) 10/16/2021     Shock liver 10/17/2021     Steroid-induced hyperglycemia      Traction bronchiectasis (H)      Past Surgical History   Past Surgical History:   Procedure Laterality Date     COLONOSCOPY W/ BIOPSIES AND POLYPECTOMY  07/21/2020     CV CORONARY ANGIOGRAM N/A 9/8/2021    Procedure: Coronary Angiogram with possible intervention;  Surgeon: Jovon Bullock MD;  Location:  HEART CARDIAC CATH LAB     CV RIGHT HEART CATH MEASUREMENTS RECORDED N/A 9/8/2021    Procedure: Right Heart Cath;  Surgeon: Jovon Bullock MD;  Location:  HEART CARDIAC CATH LAB     ESOPHAGOSCOPY, GASTROSCOPY, DUODENOSCOPY (EGD), COMBINED N/A 10/23/2021    Procedure: ESOPHAGOGASTRODUODENOSCOPY (EGD);  Surgeon: Miquel Pisano MD;  Location:  GI     EXTRACTION(S) DENTAL N/A 9/22/2021    Procedure: EXTRACTION tooth #19;  Surgeon: Deepak Tobin DDS;  Location: [facility]     HERNIA REPAIR       right acl        TRANSPLANT LUNG RECIPIENT SINGLE X2 Bilateral 10/16/2021    Procedure: TRANSPLANT, LUNG, RECIPIENT, BILATERAL, Bronchoscopy, on-pump perfusion, bilateral clamshell sternotomy;  Surgeon: Yanick Corral MD;  Location: [facility] OR     XR ACROMIOCLAVICULAR JOINT BILATERAL       Medications   Home Meds  Prior to Admission medications    Medication Sig Start Date End Date Taking? Authorizing Provider   acetaminophen (TYLENOL) 325 MG tablet Take 325 mg by mouth every 6 hours as needed for mild pain   Yes Unknown, Entered By History   amLODIPine (NORVASC) 5 MG tablet Take 5 mg by mouth daily   Yes Unknown, Entered By History   escitalopram (LEXAPRO) 5 MG tablet Take 5 mg by mouth daily   Yes Unknown, Entered By History   fluticasone-vilanterol (BREO ELLIPTA) 200-25 MCG/INH inhaler Inhale 1 puff into the lungs daily   Yes Unknown, Entered By History   insulin aspart (NOVOLOG FLEXPEN) 100 UNIT/ML pen Inject 3-11 Units Subcutaneous With meals and at bedtime.   Yes Unknown, Entered By History   levothyroxine (SYNTHROID/LEVOTHROID) 25 MCG tablet Take 25 mcg by mouth daily   Yes Unknown, Entered By History   omeprazole (PRILOSEC) 40 MG DR capsule Take 40 mg by mouth 2 times daily (before meals)   Yes Unknown, Entered By History   potassium chloride ER (KLOR-CON M) 20 MEQ CR tablet Take 20 mEq by mouth daily   Yes Unknown, Entered By History   sulfamethoxazole-trimethoprim (BACTRIM DS) 800-160 MG tablet Take 1 tablet by mouth Every Mon, Wed, Fri Morning    Yes Unknown, Entered By History       Scheduled Meds    acetaminophen  975 mg Oral or Feeding Tube Q8H MARKY     acetylcysteine  2 mL Nebulization 4x Daily     acyclovir  400 mg Oral or NG Tube BID     amiodarone  400 mg Oral BID     amLODIPine  10 mg Oral or Feeding Tube Daily     bumetanide  2 mg Intravenous TID     calcium carbonate 600 mg-vitamin D 400 units  1 tablet Oral or Feeding Tube BID w/meals     carvedilol  12.5 mg Oral or Feeding Tube BID     hydrALAZINE  100 mg  Oral or Feeding Tube Q6H     insulin aspart  1-6 Units Subcutaneous Q4H     isosorbide dinitrate  40 mg Oral or Feeding Tube TID     levalbuterol  1.25 mg Nebulization 4x Daily     levothyroxine  25 mcg Oral Daily     micafungin  100 mg Intravenous Q24H     multivitamins w/minerals  15 mL Per Feeding Tube Daily     mycophenolate  1,500 mg Oral or NG Tube BID     nystatin  1,000,000 Units Swish & Swallow 4x Daily     pantoprazole (PROTONIX) IV  40 mg Intravenous BID AC     polyethylene glycol  17 g Oral or Feeding Tube Daily     potassium chloride  20 mEq Oral Daily     predniSONE  15 mg Per Feeding Tube BID     protein modular  1 packet Per Feeding Tube BID     rosuvastatin  10 mg Oral or Feeding Tube Daily     senna-docusate  1 tablet Oral or Feeding Tube BID     sodium chloride (PF)  3 mL Intracatheter Q8H     sulfamethoxazole-trimethoprim  10 mL Oral or NG Tube Daily    Or     sulfamethoxazole-trimethoprim  1 tablet Oral or NG Tube Daily     tacrolimus  3 mg Per OG Tube BID IS       Infusion Meds    dextrose Stopped (10/24/21 1008)     dextrose       esmolol Stopped (10/24/21 1231)     fentaNYL Stopped (10/25/21 2035)     heparin Stopped (10/25/21 2129)     niCARdipine (CARDENE) infusion ADULT/PEDS GREATER than or EQUAL to 45 kg max conc Stopped (10/25/21 0935)     propofol (DIPRIVAN) infusion Stopped (10/25/21 1958)     BETA BLOCKER NOT PRESCRIBED         PRN Meds  bisacodyl, dextrose, dextrose, glucose **OR** dextrose **OR** glucagon, diphenhydrAMINE **OR** diphenhydrAMINE, fentaNYL, HYDROmorphone **OR** HYDROmorphone, labetalol, lidocaine 4%, lidocaine, lidocaine (buffered or not buffered), magnesium hydroxide, methocarbamol, naloxone **OR** naloxone **OR** naloxone **OR** naloxone, ondansetron **OR** ondansetron, oxyCODONE **OR** oxyCODONE, prochlorperazine **OR** prochlorperazine, BETA BLOCKER NOT PRESCRIBED, sodium chloride (PF)    Allergies   No Known Allergies  Family History   Family History   Problem  "Relation Age of Onset     Diabetes Type 1 Mother      Heart Disease Mother      Chronic Obstructive Pulmonary Disease Mother      Rheumatoid Arthritis Father      Emphysema Paternal Grandfather      Social History   Social History     Tobacco Use     Smoking status: Never Smoker     Smokeless tobacco: Never Used   Substance Use Topics     Alcohol use: Never     Drug use: Never       Review of Systems   Review of systems not obtained due to patient factors - mental status and intubation       PHYSICAL EXAMINATION  Temp:  [99.9  F (37.7  C)-101.5  F (38.6  C)] 101.5  F (38.6  C)  Pulse:  [] 80  Resp:  [22-37] 28  BP: ()/(54-85) 118/73  MAP:  [64 mmHg-94 mmHg] 70 mmHg  Arterial Line BP: ()/(12-68) 112/50  FiO2 (%):  [50 %-55 %] 55 %  SpO2:  [91 %-99 %] 95 %     General Exam  Physical Examination:  BP (!) 145/90   Pulse 93   Temp (!) 101.3  F (38.5  C)   Resp 30   Ht 1.626 m (5' 4\")   Wt 73.8 kg (162 lb 11.2 oz)   SpO2 97%   BMI 27.93 kg/m      Examined while off sedation.  Intubated and mechanically ventilated.    - No spontaneous eye opening in response to verbal, tactile, or noxious stimuli  - No purposeful eye movements, no blink to threat  - Left pupil 3 mm, round reactive to light  - Right pupil 2 mm, round reactive to light  -Corneal reflex intact bilaterally to drops of sterile saline  - No clear facial asymmetry  - No cough present with deep suctioning  - No gag present on stimulation with long Q-tip  - No limb movements spontaneously or with noxious stimuli  -Toes mute      Dysphagia Screen  Intubated, n.p.o.    Stroke Scales    NIHSS  Interval     Interval Comments stroke code (10/25/21 5174)   1a. Level of Consciousness 3-->Responds only with reflex motor or autonomic effects or totally unresponsive, flaccid, and areflexic   1b. LOC Questions 2-->Answers neither question correctly   1c. LOC Commands 2-->Performs neither task correctly   2.   Best Gaze 2-->Forced deviation, or total " gaze paresis not overcome by the oculocephalic maneuver   3.   Visual 0-->No visual loss   4.   Facial Palsy 3-->Complete paralysis of one or both sides (absence of facial movement in the upper and lower face)   5a. Motor Arm, Left 4-->No movement   5b. Motor Arm, Right 4-->No movement   6a. Motor Leg, Left 4-->No movement   6b. Motor Leg, right 4-->No movement   7.   Limb Ataxia 0-->Absent   8.   Sensory 2-->Severe to total sensory loss, patient is not aware of being touched in the face, arm, and leg   9.   Best Language 3-->Mute, global aphasia, no usable speech or auditory comprehension   10. Dysarthria (UN) Intubated or other physical barrier   11. Extinction and Inattention  0-->No abnormality   Total 33 (10/25/21 2124)       Imaging  I personally reviewed all imaging; relevant findings per HPI.     Lab Results Data   CBC  Recent Labs   Lab 10/25/21  0326 10/24/21  0410 10/23/21  0344   WBC 32.8* 21.4* 21.3*   RBC 3.53* 3.24* 3.21*   HGB 10.6* 9.8* 9.6*   HCT 33.1* 30.5* 30.2*    201 132*     Basic Metabolic Panel    Recent Labs   Lab 10/25/21  2113 10/25/21  2102 10/25/21  1941 10/25/21  1609 10/25/21  1043 10/25/21  0825   *  --   --  146*  --  148*   POTASSIUM 4.7  --   --  4.7  --  3.9   CHLORIDE 116*  --   --  114*  --  113*   CO2 25  --   --  26  --  28   BUN 62*  --   --  59*  --  51*   CR 1.26*  --   --  1.18  --  0.84   * 201* 186* 166*   < > 206*   ERIK 8.6  --   --  8.7  --  8.9    < > = values in this interval not displayed.     Liver Panel  Recent Labs   Lab 10/25/21  2113 10/25/21  1609 10/25/21  0825   PROTTOTAL 5.6* 5.7* 5.7*   ALBUMIN 1.6* 1.6* 1.6*   BILITOTAL 0.3 0.5 0.3   ALKPHOS 157* 163* 174*   AST 21 20 20   ALT 84* 88* 101*     INR    Recent Labs   Lab Test 10/25/21  1609 10/25/21  0326 10/24/21  1626   INR 1.27* 1.23* 1.19*      Lipid Profile    Recent Labs   Lab Test 10/22/21  0407 10/21/21  0354 10/20/21  0308 10/19/21  0338 09/07/21  1324   CHOL  --   --   --   --   214*   HDL  --   --   --   --  51   LDL  --   --   --   --  130*   TRIG 307* 486* 383*   < > 364*    < > = values in this interval not displayed.     A1C    Recent Labs   Lab Test 09/07/21  0928 09/05/21  1901   A1C 6.6* 6.5*     Troponin I    Recent Labs   Lab 10/22/21  0958   TROPONIN 0.807*          Stroke Code Data Data   Stroke Code Data  (for stroke code without tele)  Stroke code activated 10/25/21    (Code never paged out to stroke pagers. Per RN called at approx 21:30)   First stroke provider response 10/25/21   2135   Last known normal 10/25/21   1600   Time of discovery   (or onset of symptoms) 10/25/21   2115   Head CT read by Stroke Neuro Dr/Provider 10/25/21   2155 (delay to scanner caused by getting patient and all equipment ready to transport)   Was stroke code de-escalated? Yes 10/25/21 2238  presence of contraindications for both intravenous and intra-arterial stroke treatments       This note was dictated with voice to text and may contain transcription errors.

## 2021-10-27 ENCOUNTER — APPOINTMENT (OUTPATIENT)
Dept: NEUROLOGY | Facility: CLINIC | Age: 56
End: 2021-10-27
Attending: STUDENT IN AN ORGANIZED HEALTH CARE EDUCATION/TRAINING PROGRAM
Payer: COMMERCIAL

## 2021-10-27 ENCOUNTER — PREP FOR PROCEDURE (OUTPATIENT)
Dept: SURGERY | Facility: CLINIC | Age: 56
End: 2021-10-27

## 2021-10-27 ENCOUNTER — APPOINTMENT (OUTPATIENT)
Dept: GENERAL RADIOLOGY | Facility: CLINIC | Age: 56
End: 2021-10-27
Attending: STUDENT IN AN ORGANIZED HEALTH CARE EDUCATION/TRAINING PROGRAM
Payer: COMMERCIAL

## 2021-10-27 ENCOUNTER — APPOINTMENT (OUTPATIENT)
Dept: GENERAL RADIOLOGY | Facility: CLINIC | Age: 56
End: 2021-10-27
Attending: NURSE PRACTITIONER
Payer: COMMERCIAL

## 2021-10-27 DIAGNOSIS — Z99.11 VENTILATOR DEPENDENCE (H): Primary | ICD-10-CM

## 2021-10-27 DIAGNOSIS — R62.7 FAILURE TO THRIVE IN ADULT: ICD-10-CM

## 2021-10-27 LAB
ALBUMIN SERPL-MCNC: 1.8 G/DL (ref 3.4–5)
ALP SERPL-CCNC: 160 U/L (ref 40–150)
ALT SERPL W P-5'-P-CCNC: 71 U/L (ref 0–70)
ANION GAP SERPL CALCULATED.3IONS-SCNC: 5 MMOL/L (ref 3–14)
AST SERPL W P-5'-P-CCNC: 27 U/L (ref 0–45)
BACTERIA PLR CULT: ABNORMAL
BACTERIA SPT CULT: ABNORMAL
BASE EXCESS BLDA CALC-SCNC: 4.9 MMOL/L (ref -9–1.8)
BASOPHILS # BLD AUTO: 0.1 10E3/UL (ref 0–0.2)
BASOPHILS NFR BLD AUTO: 0 %
BILIRUB SERPL-MCNC: 0.3 MG/DL (ref 0.2–1.3)
BUN SERPL-MCNC: 60 MG/DL (ref 7–30)
C DIFF TOX B STL QL: NEGATIVE
CALCIUM SERPL-MCNC: 9.1 MG/DL (ref 8.5–10.1)
CHLORIDE BLD-SCNC: 120 MMOL/L (ref 94–109)
CMV DNA SPEC NAA+PROBE-ACNC: NOT DETECTED IU/ML
CO2 SERPL-SCNC: 27 MMOL/L (ref 20–32)
CREAT SERPL-MCNC: 0.9 MG/DL (ref 0.66–1.25)
EOSINOPHIL # BLD AUTO: 0 10E3/UL (ref 0–0.7)
EOSINOPHIL NFR BLD AUTO: 0 %
ERYTHROCYTE [DISTWIDTH] IN BLOOD BY AUTOMATED COUNT: 16 % (ref 10–15)
GFR SERPL CREATININE-BSD FRML MDRD: >90 ML/MIN/1.73M2
GLUCOSE BLD-MCNC: 208 MG/DL (ref 70–99)
GLUCOSE BLDC GLUCOMTR-MCNC: 134 MG/DL (ref 70–99)
GLUCOSE BLDC GLUCOMTR-MCNC: 154 MG/DL (ref 70–99)
GLUCOSE BLDC GLUCOMTR-MCNC: 160 MG/DL (ref 70–99)
GLUCOSE BLDC GLUCOMTR-MCNC: 165 MG/DL (ref 70–99)
GLUCOSE BLDC GLUCOMTR-MCNC: 172 MG/DL (ref 70–99)
GLUCOSE BLDC GLUCOMTR-MCNC: 175 MG/DL (ref 70–99)
GLUCOSE BLDC GLUCOMTR-MCNC: 179 MG/DL (ref 70–99)
GLUCOSE BLDC GLUCOMTR-MCNC: 193 MG/DL (ref 70–99)
GLUCOSE BLDC GLUCOMTR-MCNC: 194 MG/DL (ref 70–99)
GLUCOSE BLDC GLUCOMTR-MCNC: 203 MG/DL (ref 70–99)
GLUCOSE BLDC GLUCOMTR-MCNC: 205 MG/DL (ref 70–99)
GLUCOSE BLDC GLUCOMTR-MCNC: 215 MG/DL (ref 70–99)
GLUCOSE BLDC GLUCOMTR-MCNC: 220 MG/DL (ref 70–99)
GLUCOSE BLDC GLUCOMTR-MCNC: 232 MG/DL (ref 70–99)
GLUCOSE BLDC GLUCOMTR-MCNC: 275 MG/DL (ref 70–99)
GRAM STAIN RESULT: ABNORMAL
HCO3 BLD-SCNC: 28 MMOL/L (ref 21–28)
HCT VFR BLD AUTO: 35.3 % (ref 40–53)
HGB BLD-MCNC: 10.8 G/DL (ref 13.3–17.7)
IMM GRANULOCYTES # BLD: 1 10E3/UL
IMM GRANULOCYTES NFR BLD: 3 %
INR PPP: 1.16 (ref 0.85–1.15)
LYMPHOCYTES # BLD AUTO: 1.4 10E3/UL (ref 0.8–5.3)
LYMPHOCYTES NFR BLD AUTO: 4 %
MAGNESIUM SERPL-MCNC: 2 MG/DL (ref 1.6–2.3)
MCH RBC QN AUTO: 30.2 PG (ref 26.5–33)
MCHC RBC AUTO-ENTMCNC: 30.6 G/DL (ref 31.5–36.5)
MCV RBC AUTO: 99 FL (ref 78–100)
MONOCYTES # BLD AUTO: 1.8 10E3/UL (ref 0–1.3)
MONOCYTES NFR BLD AUTO: 5 %
NEUTROPHILS # BLD AUTO: 33 10E3/UL (ref 1.6–8.3)
NEUTROPHILS NFR BLD AUTO: 88 %
NRBC # BLD AUTO: 0.1 10E3/UL
NRBC BLD AUTO-RTO: 0 /100
O2/TOTAL GAS SETTING VFR VENT: 60 %
PATH REPORT.COMMENTS IMP SPEC: ABNORMAL
PATH REPORT.COMMENTS IMP SPEC: ABNORMAL
PATH REPORT.COMMENTS IMP SPEC: YES
PATH REPORT.FINAL DX SPEC: ABNORMAL
PATH REPORT.GROSS SPEC: ABNORMAL
PATH REPORT.RELEVANT HX SPEC: ABNORMAL
PCO2 BLD: 36 MM HG (ref 35–45)
PH BLD: 7.5 [PH] (ref 7.35–7.45)
PHOSPHATE SERPL-MCNC: 2.6 MG/DL (ref 2.5–4.5)
PHOSPHATE SERPL-MCNC: 3.2 MG/DL (ref 2.5–4.5)
PHOSPHATE SERPL-MCNC: 3.2 MG/DL (ref 2.5–4.5)
PLATELET # BLD AUTO: 273 10E3/UL (ref 150–450)
PO2 BLD: 130 MM HG (ref 80–105)
POTASSIUM BLD-SCNC: 4.4 MMOL/L (ref 3.4–5.3)
POTASSIUM BLD-SCNC: 5.2 MMOL/L (ref 3.4–5.3)
PROT SERPL-MCNC: 6.2 G/DL (ref 6.8–8.8)
RBC # BLD AUTO: 3.58 10E6/UL (ref 4.4–5.9)
SODIUM SERPL-SCNC: 148 MMOL/L (ref 133–144)
SODIUM SERPL-SCNC: 150 MMOL/L (ref 133–144)
SODIUM SERPL-SCNC: 152 MMOL/L (ref 133–144)
TACROLIMUS BLD-MCNC: 7 UG/L (ref 5–15)
TME LAST DOSE: NORMAL H
TME LAST DOSE: NORMAL H
UFH PPP CHRO-ACNC: 0.16 IU/ML
UFH PPP CHRO-ACNC: 0.37 IU/ML
UFH PPP CHRO-ACNC: 0.42 IU/ML
WBC # BLD AUTO: 37.3 10E3/UL (ref 4–11)

## 2021-10-27 PROCEDURE — 94640 AIRWAY INHALATION TREATMENT: CPT | Mod: 76

## 2021-10-27 PROCEDURE — 250N000012 HC RX MED GY IP 250 OP 636 PS 637: Performed by: NURSE PRACTITIONER

## 2021-10-27 PROCEDURE — 36415 COLL VENOUS BLD VENIPUNCTURE: CPT | Performed by: NURSE PRACTITIONER

## 2021-10-27 PROCEDURE — 250N000009 HC RX 250: Performed by: STUDENT IN AN ORGANIZED HEALTH CARE EDUCATION/TRAINING PROGRAM

## 2021-10-27 PROCEDURE — 82803 BLOOD GASES ANY COMBINATION: CPT | Performed by: STUDENT IN AN ORGANIZED HEALTH CARE EDUCATION/TRAINING PROGRAM

## 2021-10-27 PROCEDURE — 999N000157 HC STATISTIC RCP TIME EA 10 MIN

## 2021-10-27 PROCEDURE — 250N000011 HC RX IP 250 OP 636: Performed by: STUDENT IN AN ORGANIZED HEALTH CARE EDUCATION/TRAINING PROGRAM

## 2021-10-27 PROCEDURE — 258N000003 HC RX IP 258 OP 636: Performed by: INTERNAL MEDICINE

## 2021-10-27 PROCEDURE — 99232 SBSQ HOSP IP/OBS MODERATE 35: CPT | Mod: 25 | Performed by: PSYCHIATRY & NEUROLOGY

## 2021-10-27 PROCEDURE — 85520 HEPARIN ASSAY: CPT | Performed by: INTERNAL MEDICINE

## 2021-10-27 PROCEDURE — 250N000009 HC RX 250: Performed by: INTERNAL MEDICINE

## 2021-10-27 PROCEDURE — 71045 X-RAY EXAM CHEST 1 VIEW: CPT | Mod: 26 | Performed by: RADIOLOGY

## 2021-10-27 PROCEDURE — 85520 HEPARIN ASSAY: CPT | Performed by: THORACIC SURGERY (CARDIOTHORACIC VASCULAR SURGERY)

## 2021-10-27 PROCEDURE — 02PYX3Z REMOVAL OF INFUSION DEVICE FROM GREAT VESSEL, EXTERNAL APPROACH: ICD-10-PCS | Performed by: INTERNAL MEDICINE

## 2021-10-27 PROCEDURE — 94668 MNPJ CHEST WALL SBSQ: CPT

## 2021-10-27 PROCEDURE — 80053 COMPREHEN METABOLIC PANEL: CPT | Performed by: NURSE PRACTITIONER

## 2021-10-27 PROCEDURE — 94003 VENT MGMT INPAT SUBQ DAY: CPT

## 2021-10-27 PROCEDURE — 36415 COLL VENOUS BLD VENIPUNCTURE: CPT | Performed by: INTERNAL MEDICINE

## 2021-10-27 PROCEDURE — 94640 AIRWAY INHALATION TREATMENT: CPT

## 2021-10-27 PROCEDURE — 95720 EEG PHY/QHP EA INCR W/VEEG: CPT | Mod: GC | Performed by: PSYCHIATRY & NEUROLOGY

## 2021-10-27 PROCEDURE — 250N000013 HC RX MED GY IP 250 OP 250 PS 637: Performed by: ANESTHESIOLOGY

## 2021-10-27 PROCEDURE — 250N000011 HC RX IP 250 OP 636: Performed by: ANESTHESIOLOGY

## 2021-10-27 PROCEDURE — 36415 COLL VENOUS BLD VENIPUNCTURE: CPT | Performed by: THORACIC SURGERY (CARDIOTHORACIC VASCULAR SURGERY)

## 2021-10-27 PROCEDURE — 250N000013 HC RX MED GY IP 250 OP 250 PS 637

## 2021-10-27 PROCEDURE — 200N000002 HC R&B ICU UMMC

## 2021-10-27 PROCEDURE — 84100 ASSAY OF PHOSPHORUS: CPT | Performed by: NURSE PRACTITIONER

## 2021-10-27 PROCEDURE — C9113 INJ PANTOPRAZOLE SODIUM, VIA: HCPCS | Performed by: ANESTHESIOLOGY

## 2021-10-27 PROCEDURE — 99291 CRITICAL CARE FIRST HOUR: CPT | Mod: 24 | Performed by: STUDENT IN AN ORGANIZED HEALTH CARE EDUCATION/TRAINING PROGRAM

## 2021-10-27 PROCEDURE — 83735 ASSAY OF MAGNESIUM: CPT | Performed by: NURSE PRACTITIONER

## 2021-10-27 PROCEDURE — 36415 COLL VENOUS BLD VENIPUNCTURE: CPT | Performed by: PHYSICIAN ASSISTANT

## 2021-10-27 PROCEDURE — 250N000013 HC RX MED GY IP 250 OP 250 PS 637: Performed by: NURSE PRACTITIONER

## 2021-10-27 PROCEDURE — 85004 AUTOMATED DIFF WBC COUNT: CPT | Performed by: STUDENT IN AN ORGANIZED HEALTH CARE EDUCATION/TRAINING PROGRAM

## 2021-10-27 PROCEDURE — 84295 ASSAY OF SERUM SODIUM: CPT | Performed by: THORACIC SURGERY (CARDIOTHORACIC VASCULAR SURGERY)

## 2021-10-27 PROCEDURE — 84132 ASSAY OF SERUM POTASSIUM: CPT | Performed by: PHYSICIAN ASSISTANT

## 2021-10-27 PROCEDURE — 84295 ASSAY OF SERUM SODIUM: CPT | Performed by: PHYSICIAN ASSISTANT

## 2021-10-27 PROCEDURE — 84100 ASSAY OF PHOSPHORUS: CPT | Performed by: PHYSICIAN ASSISTANT

## 2021-10-27 PROCEDURE — 99233 SBSQ HOSP IP/OBS HIGH 50: CPT | Mod: 24 | Performed by: INTERNAL MEDICINE

## 2021-10-27 PROCEDURE — 87493 C DIFF AMPLIFIED PROBE: CPT

## 2021-10-27 PROCEDURE — 250N000013 HC RX MED GY IP 250 OP 250 PS 637: Performed by: STUDENT IN AN ORGANIZED HEALTH CARE EDUCATION/TRAINING PROGRAM

## 2021-10-27 PROCEDURE — 71045 X-RAY EXAM CHEST 1 VIEW: CPT | Mod: 77

## 2021-10-27 PROCEDURE — 250N000009 HC RX 250: Performed by: NURSE PRACTITIONER

## 2021-10-27 PROCEDURE — 95714 VEEG EA 12-26 HR UNMNTR: CPT

## 2021-10-27 PROCEDURE — 85610 PROTHROMBIN TIME: CPT | Performed by: NURSE PRACTITIONER

## 2021-10-27 PROCEDURE — 80197 ASSAY OF TACROLIMUS: CPT | Performed by: NURSE PRACTITIONER

## 2021-10-27 PROCEDURE — 71045 X-RAY EXAM CHEST 1 VIEW: CPT

## 2021-10-27 RX ORDER — CEFAZOLIN SODIUM 2 G/50ML
2 SOLUTION INTRAVENOUS SEE ADMIN INSTRUCTIONS
Status: CANCELLED | OUTPATIENT
Start: 2021-10-27

## 2021-10-27 RX ORDER — CEFAZOLIN SODIUM 2 G/50ML
2 SOLUTION INTRAVENOUS
Status: CANCELLED | OUTPATIENT
Start: 2021-10-27

## 2021-10-27 RX ADMIN — MULTIVIT AND MINERALS-FERROUS GLUCONATE 9 MG IRON/15 ML ORAL LIQUID 15 ML: at 08:08

## 2021-10-27 RX ADMIN — CARVEDILOL 12.5 MG: 6.25 TABLET, FILM COATED ORAL at 08:12

## 2021-10-27 RX ADMIN — ACYCLOVIR 400 MG: 200 SUSPENSION ORAL at 08:08

## 2021-10-27 RX ADMIN — ISOSORBIDE DINITRATE 40 MG: 10 TABLET ORAL at 11:39

## 2021-10-27 RX ADMIN — ISOSORBIDE DINITRATE 40 MG: 10 TABLET ORAL at 08:09

## 2021-10-27 RX ADMIN — FLUCONAZOLE 400 MG: 200 TABLET ORAL at 08:10

## 2021-10-27 RX ADMIN — LEVALBUTEROL HYDROCHLORIDE 1.25 MG: 1.25 SOLUTION RESPIRATORY (INHALATION) at 12:17

## 2021-10-27 RX ADMIN — ACETYLCYSTEINE 2 ML: 200 SOLUTION ORAL; RESPIRATORY (INHALATION) at 07:16

## 2021-10-27 RX ADMIN — NYSTATIN 1000000 UNITS: 500000 SUSPENSION ORAL at 20:45

## 2021-10-27 RX ADMIN — TACROLIMUS 1.5 MG: 5 CAPSULE ORAL at 08:13

## 2021-10-27 RX ADMIN — NYSTATIN 1000000 UNITS: 500000 SUSPENSION ORAL at 08:08

## 2021-10-27 RX ADMIN — METHOCARBAMOL 750 MG: 750 TABLET ORAL at 08:09

## 2021-10-27 RX ADMIN — CARVEDILOL 12.5 MG: 6.25 TABLET, FILM COATED ORAL at 20:49

## 2021-10-27 RX ADMIN — HEPARIN SODIUM 1000 UNITS/HR: 10000 INJECTION, SOLUTION INTRAVENOUS at 22:07

## 2021-10-27 RX ADMIN — HUMAN INSULIN 7 UNITS/HR: 100 INJECTION, SOLUTION SUBCUTANEOUS at 04:08

## 2021-10-27 RX ADMIN — ACETAMINOPHEN 975 MG: 325 TABLET, FILM COATED ORAL at 11:39

## 2021-10-27 RX ADMIN — POTASSIUM CHLORIDE 20 MEQ: 40 SOLUTION ORAL at 08:09

## 2021-10-27 RX ADMIN — ACETAMINOPHEN 975 MG: 325 TABLET, FILM COATED ORAL at 02:20

## 2021-10-27 RX ADMIN — Medication 40 MG: at 15:53

## 2021-10-27 RX ADMIN — PANTOPRAZOLE SODIUM 40 MG: 40 INJECTION, POWDER, FOR SOLUTION INTRAVENOUS at 08:09

## 2021-10-27 RX ADMIN — NYSTATIN 1000000 UNITS: 500000 SUSPENSION ORAL at 15:52

## 2021-10-27 RX ADMIN — ACYCLOVIR 400 MG: 200 SUSPENSION ORAL at 20:57

## 2021-10-27 RX ADMIN — LEVALBUTEROL HYDROCHLORIDE 1.25 MG: 1.25 SOLUTION RESPIRATORY (INHALATION) at 20:33

## 2021-10-27 RX ADMIN — AMLODIPINE BESYLATE 10 MG: 10 TABLET ORAL at 08:10

## 2021-10-27 RX ADMIN — MYCOPHENOLATE MOFETIL 1500 MG: 200 POWDER, FOR SUSPENSION ORAL at 08:08

## 2021-10-27 RX ADMIN — AMIODARONE HYDROCHLORIDE 400 MG: 200 TABLET ORAL at 08:10

## 2021-10-27 RX ADMIN — ISOSORBIDE DINITRATE 40 MG: 10 TABLET ORAL at 17:33

## 2021-10-27 RX ADMIN — ACETYLCYSTEINE 2 ML: 200 SOLUTION ORAL; RESPIRATORY (INHALATION) at 20:33

## 2021-10-27 RX ADMIN — HYDRALAZINE HYDROCHLORIDE 100 MG: 50 TABLET, FILM COATED ORAL at 15:52

## 2021-10-27 RX ADMIN — DOCUSATE SODIUM 50 MG AND SENNOSIDES 8.6 MG 1 TABLET: 8.6; 5 TABLET, FILM COATED ORAL at 08:09

## 2021-10-27 RX ADMIN — CALCIUM CARBONATE 600 MG (1,500 MG)-VITAMIN D3 400 UNIT TABLET 1 TABLET: at 17:33

## 2021-10-27 RX ADMIN — LEVALBUTEROL HYDROCHLORIDE 1.25 MG: 1.25 SOLUTION RESPIRATORY (INHALATION) at 15:09

## 2021-10-27 RX ADMIN — PREDNISONE 15 MG: 5 TABLET ORAL at 20:50

## 2021-10-27 RX ADMIN — SODIUM PHOSPHATE, MONOBASIC, MONOHYDRATE AND SODIUM PHOSPHATE, DIBASIC, ANHYDROUS 9 MMOL: 276; 142 INJECTION, SOLUTION INTRAVENOUS at 09:21

## 2021-10-27 RX ADMIN — Medication 1 PACKET: at 20:55

## 2021-10-27 RX ADMIN — BUMETANIDE 2 MG: 0.25 INJECTION, SOLUTION INTRAMUSCULAR; INTRAVENOUS at 17:33

## 2021-10-27 RX ADMIN — AMIODARONE HYDROCHLORIDE 400 MG: 200 TABLET ORAL at 20:49

## 2021-10-27 RX ADMIN — BUMETANIDE 2 MG: 0.25 INJECTION, SOLUTION INTRAMUSCULAR; INTRAVENOUS at 08:09

## 2021-10-27 RX ADMIN — HYDRALAZINE HYDROCHLORIDE 100 MG: 50 TABLET, FILM COATED ORAL at 08:09

## 2021-10-27 RX ADMIN — LEVALBUTEROL HYDROCHLORIDE 1.25 MG: 1.25 SOLUTION RESPIRATORY (INHALATION) at 07:16

## 2021-10-27 RX ADMIN — ROSUVASTATIN CALCIUM 10 MG: 10 TABLET, FILM COATED ORAL at 08:12

## 2021-10-27 RX ADMIN — Medication 1 PACKET: at 08:13

## 2021-10-27 RX ADMIN — ACETYLCYSTEINE 2 ML: 200 SOLUTION ORAL; RESPIRATORY (INHALATION) at 15:09

## 2021-10-27 RX ADMIN — HYDROMORPHONE HYDROCHLORIDE 0.2 MG: 0.2 INJECTION, SOLUTION INTRAMUSCULAR; INTRAVENOUS; SUBCUTANEOUS at 00:30

## 2021-10-27 RX ADMIN — TACROLIMUS 1.5 MG: 5 CAPSULE ORAL at 17:34

## 2021-10-27 RX ADMIN — HYDRALAZINE HYDROCHLORIDE 100 MG: 50 TABLET, FILM COATED ORAL at 20:49

## 2021-10-27 RX ADMIN — HYDRALAZINE HYDROCHLORIDE 100 MG: 50 TABLET, FILM COATED ORAL at 02:20

## 2021-10-27 RX ADMIN — LEVOTHYROXINE SODIUM 25 MCG: 0.03 TABLET ORAL at 08:13

## 2021-10-27 RX ADMIN — BUMETANIDE 2 MG: 0.25 INJECTION, SOLUTION INTRAMUSCULAR; INTRAVENOUS at 11:38

## 2021-10-27 RX ADMIN — PREDNISONE 15 MG: 5 TABLET ORAL at 08:09

## 2021-10-27 RX ADMIN — NYSTATIN 1000000 UNITS: 500000 SUSPENSION ORAL at 11:39

## 2021-10-27 RX ADMIN — CALCIUM CARBONATE 600 MG (1,500 MG)-VITAMIN D3 400 UNIT TABLET 1 TABLET: at 08:12

## 2021-10-27 RX ADMIN — SULFAMETHOXAZOLE AND TRIMETHOPRIM 80 MG: 200; 40 SUSPENSION ORAL at 08:08

## 2021-10-27 RX ADMIN — ACETYLCYSTEINE 2 ML: 200 SOLUTION ORAL; RESPIRATORY (INHALATION) at 12:17

## 2021-10-27 RX ADMIN — ACETAMINOPHEN 975 MG: 325 TABLET, FILM COATED ORAL at 17:33

## 2021-10-27 RX ADMIN — MYCOPHENOLATE MOFETIL 1500 MG: 200 POWDER, FOR SUSPENSION ORAL at 20:50

## 2021-10-27 RX ADMIN — OXYCODONE HYDROCHLORIDE 10 MG: 10 TABLET ORAL at 08:09

## 2021-10-27 ASSESSMENT — ACTIVITIES OF DAILY LIVING (ADL)
ADLS_ACUITY_SCORE: 24

## 2021-10-27 ASSESSMENT — MIFFLIN-ST. JEOR: SCORE: 1459

## 2021-10-27 ASSESSMENT — VISUAL ACUITY: OU: OTHER (SEE COMMENT)

## 2021-10-27 NOTE — PROGRESS NOTES
[facility]    Stroke Progress Note    Interval Events- Pt remains unable to engage in meaningful communication.  - Repeat MRI showed stable infarcts progressing as suspected.    HPI Summary  Edson Thornton is a 56 year old male with with Afib, HTN, RA, SALTY, and ILD s/p lung transplant 10/16/21.  Per primary team he has had significant difficulty with ventilation required paralytics and sedation, however, he has typically moved all his extremities with full strength.  It was noted on 10/21 that he was no longer moving his lower extremities.  Stroke code activated on morning of 10/22 for persistent neurologic change.  CT and Brain MRI showed subacute bilateral cortical infarcts and left cerebellar infarct.  Unchanged neurologic exam while following.     Stroke Evaluation Summarized    MRI/Head CT MRI Brain (10/26/2021):  Impression:  1. Evolving acute age multifocal acute infarctions in both cerebral  hemispheres and left cerebellum. No new areas of infarction when  compared with prior MRI brain on 10/23/2021.  2. Minimally increased punctate regions of susceptibility effect  within areas of infarction, corresponding to petechial hemorrhage  within previously identified multifocal acute infarctions.  3. Head MRA demonstrates no definite aneurysm or stenosis of the major  intracranial arteries.  4. Neck MRA demonstrates patent major cervical arteries.    MRI Brain (10/23/2021):  Impression:  Multifocal subacute infarcts within both cerebral hemispheres and left cerebellum all of which appear present on prior head CT 10/22/2021.  Minimal petechial hemorrhage associated with the infarct in the left occipital lobe.     Head CT (10/22/2021) : multiple scattered subacute ischemic strokes, predating exam change from 10/22.  May contribute to encephalopathy, but would not explain lack of extremity movement.     Head CT (10/25/2021): This exam is significantly limited by  motion and streak artifact. L posterior parietal lobe acute/subacute infarct is identified on current exam w/ remaining previously seen infarct possibly obscured by artifact. No CT evidence for new cortical hypoattentuation to indicate a new acute infarct. No definite acute intracranial hemorrhage or midline shift     Intracranial Vasculature   Head CTA (10/25/2021): No intracranial arterial large vessel occlusion or significant stenosis/occlusion    Head CTA (10/22/2021): demonstrates no aneurysm or stenosis of major intracranial arteries.    CT Perfusion (10/22/2021): focally decreased cerebral blood volume and cerebral blood flow in the posterior most left parieto-occipital region with corresponding slight increase in TTP possibly representing an area of hypoperfusion or core infarct.      Cervical Vasculature   CTA Neck (10/22/2021): Mild stenosis in the R vertebral artery w/ severe high-grade stenosis at the L vertebral artery origin    CTA Neck (10/22/2021): No stenosis of major cervical arteries     Echocardiogram TC reported no valvular vegetations and no pulmonary vein thrombosis per primary team.     EKG/Telemetry   Sinus tachycardia   Otherwise normal ECG   When compared with ECG of 21-OCT-2021 06:50,   ST no longer depressed in Anterior leads   ST no longer elevated in Lateral leads      Other Testing   EEG showed no epileptiform discharges.  Positive for diffuse encephalopathy.      LDL  No lab value available in past 30 days   A1C  No lab value available in past 30 days   Troponin No lab value available in past 48 hrs       Impression     # Acute to subacute ischemic stroke of bilateral cerebral hemispheres and left cerebellum with suspected cardioembolic etiology  # Encephalopathy with Lack of Reactivity on Physical Exam    Pt's initial stroke code was called on 10/22 for change in physical exam. Per nursing report, the pt had previously been moving all extremities to the point of requiring  restraints. However, pt suddenly stopped moving altogether on 10/21 ~7PM. No Stroke Code was called until the next morning. CTH revealed multiple likely subacute ischemic infarcts in the bilateral cerebral hemispheres and L cerebellar hemisphere while CTA Head/Neck was unrevealing. Also performed a CT Perfusion (10/22) which noted similar findings. At the time, sustained clonus was noted on exam, raising concerns for cervical myelopathy. However, this improved on subsequent exams and CT C-spine was unremarkable. The remainder of the stroke work-up was completed w/ MRI Brain again showing similar finding to CT images. At this time, raised concern that pt's noted strokes did not correspond well to his current deficits and that these were unlikely to be the cause of his change in physical exam. Obtained an EEG which only revealed diffuse encephalopathy. Pt was noted to have multiple metabolic/toxic derangements and so these were seen as the likely culprits. Has since been found to have an BRENNAN and Candidemia.    A second stroke code was called on 10/25 again for decreased reactivity on exam. Repeat CTH and CTA Head/Neck during the code were unrevealing. Exam appears mildly improved today but still seems too dense to be solely metabolic abnormalities and ongoing hypoxic/hypercapneic respiratory failure. Infection is highly suspect as he has begun to spike recurrent fevers. Per nursing report, his fevers broke on the evening of 10/27 and the pt has been afebrile sine. These fevers persisted despite empiric antibiotics, antivirals, and he is now on antifungals. Primary team in the process of determining source of infection. Also of concern is increased stroke burden not seen on CT imaging or potential for a broader cortical damage caused by something such as anoxic brain injury or the potential like PRES. Repeat MRI with addendum stated that original known infarcts are progressing as expected; the findings on repeat MRI  are less likely to be associated with PRES.      Recommendations:  - Recommend LP to investigate for further source of infection.   - Anticoagulation per primary.  - We will repeat a vEEG to correlate with spontaneous extremity movement.     Thank you for including neurology in the care of this patient.  Please call with additional questions or concerns relating to stroke.     This patient was staffed with the attending stroke physician, Dr. Albarran.      Vincent Knox DO  Neurology Resident, PGY-1  Ph: *[phone]  ______________________________________________________    Clinically Significant Risk Factors Present on Admission                  Medications   Scheduled Meds    acetaminophen  975 mg Oral or Feeding Tube Q8H MARKY     acetylcysteine  2 mL Nebulization 4x Daily     acyclovir  400 mg Oral or NG Tube BID     amiodarone  400 mg Oral BID     amLODIPine  10 mg Oral or Feeding Tube Daily     bumetanide  2 mg Intravenous TID     calcium carbonate 600 mg-vitamin D 400 units  1 tablet Oral or Feeding Tube BID w/meals     carvedilol  12.5 mg Oral or Feeding Tube BID     fluconazole  400 mg Oral Daily     hydrALAZINE  100 mg Oral or Feeding Tube Q6H     isosorbide dinitrate  40 mg Oral or Feeding Tube TID     lactated ringers  1,000 mL Intravenous Once     levalbuterol  1.25 mg Nebulization 4x Daily     levothyroxine  25 mcg Oral Daily     multivitamins w/minerals  15 mL Per Feeding Tube Daily     mycophenolate  1,500 mg Oral or NG Tube BID     nystatin  1,000,000 Units Swish & Swallow 4x Daily     pantoprazole  40 mg Per Feeding Tube BID AC     polyethylene glycol  17 g Oral or Feeding Tube Daily     potassium chloride  20 mEq Oral Daily     [START ON 10/31/2021] predniSONE  15 mg Oral QAM    And     [START ON 10/31/2021] predniSONE  12.5 mg Oral QPM     predniSONE  15 mg Per Feeding Tube BID     protein modular  1 packet Per Feeding Tube BID     rosuvastatin  10 mg Oral or Feeding Tube Daily     senna-docusate  1  tablet Oral or Feeding Tube BID     sodium chloride (PF)  3 mL Intracatheter Q8H     sodium phosphate  9 mmol Intravenous Once     sulfamethoxazole-trimethoprim  10 mL Oral or NG Tube Daily    Or     sulfamethoxazole-trimethoprim  1 tablet Oral or NG Tube Daily     tacrolimus  1.5 mg Per OG Tube BID IS       Infusion Meds    dextrose       dextrose Stopped (10/24/21 1008)     heparin 850 Units/hr (10/27/21 1000)     insulin regular 8 Units/hr (10/27/21 1000)     BETA BLOCKER NOT PRESCRIBED         PRN Meds  bisacodyl, dextrose, dextrose, glucose **OR** dextrose **OR** glucagon, diphenhydrAMINE **OR** diphenhydrAMINE, labetalol, lidocaine 4%, lidocaine, lidocaine (buffered or not buffered), magnesium hydroxide, naloxone **OR** naloxone **OR** naloxone **OR** naloxone, ondansetron **OR** ondansetron, oxyCODONE **OR** oxyCODONE, prochlorperazine **OR** prochlorperazine, BETA BLOCKER NOT PRESCRIBED, sodium chloride (PF)       PHYSICAL EXAMINATION  Temp:  [99  F (37.2  C)-101.8  F (38.8  C)] 99.7  F (37.6  C)  Pulse:  [76-97] 81  Resp:  [23-55] 35  BP: ()/(47-92) 114/72  FiO2 (%):  [50 %-60 %] 50 %  SpO2:  [94 %-99 %] 97 %      Mental Status:  Intubated, does not follow commands, does not open eyes to noxious stim, but does resist eye opening/shining light into eyes  Cranial Nerves:  PERRL, +corneal reflex bilaterall, +cough and gag reflex. Face appears grossly symmetric, does occasionally resist eye opening/shining light into his eyes bilaterally  Motor: No abnormal movements. Fails to withdraw to noxious stim or produce any other movements  Reflexes:  Reflexes brisk globally. Mute toes. No clonus   Sensory: Fails to withdraw to noxious stimuli in all extremitities  Coordination:  Unable to assess  Station/Gait:  Deferred    Stroke Scales    NIHSS  Interval     Interval Comments stroke code (10/25/21 8579)   1a. Level of Consciousness 3-->Responds only with reflex motor or autonomic effects or totally  unresponsive, flaccid, and areflexic   1b. LOC Questions 2-->Answers neither question correctly   1c. LOC Commands 2-->Performs neither task correctly   2.   Best Gaze 2-->Forced deviation, or total gaze paresis not overcome by the oculocephalic maneuver   3.   Visual 0-->No visual loss   4.   Facial Palsy 3-->Complete paralysis of one or both sides (absence of facial movement in the upper and lower face)   5a. Motor Arm, Left 4-->No movement   5b. Motor Arm, Right 4-->No movement   6a. Motor Leg, Left 4-->No movement   6b. Motor Leg, right 4-->No movement   7.   Limb Ataxia 0-->Absent   8.   Sensory 2-->Severe to total sensory loss, patient is not aware of being touched in the face, arm, and leg   9.   Best Language 3-->Mute, global aphasia, no usable speech or auditory comprehension   10. Dysarthria (UN) Intubated or other physical barrier   11. Extinction and Inattention  0-->No abnormality   Total 33 (10/25/21 2124)       Imaging  I personally reviewed all imaging; relevant findings per HPI.     Lab Results Data   CBC  Recent Labs   Lab 10/27/21  0523 10/26/21  1020 10/25/21  0326   WBC 37.3* 35.8* 32.8*   RBC 3.58* 3.28* 3.53*   HGB 10.8* 9.9* 10.6*   HCT 35.3* 32.0* 33.1*    341 407     Basic Metabolic Panel    Recent Labs   Lab 10/27/21  1155 10/27/21  1106 10/27/21  1007 10/27/21  0621 10/27/21  0523 10/27/21  0017 10/26/21  2338 10/26/21  1717 10/26/21  1452 10/26/21  0931 10/26/21  0637   NA  --   --   --   --  152*  --  150*  --  151*   < > 148*   POTASSIUM  --   --   --   --  4.4  --   --   --  5.0  --  4.1   CHLORIDE  --   --   --   --  120*  --   --   --  121*  --  117*   CO2  --   --   --   --  27  --   --   --  25  --  25   BUN  --   --   --   --  60*  --   --   --  63*  --  62*   CR  --   --   --   --  0.90  --   --   --  1.16  --  1.11   * 205* 220*   < > 208*   < >  --    < > 244*   < > 206*   ERIK  --   --   --   --  9.1  --   --   --  8.6  --  8.9    < > = values in this interval  not displayed.     Liver Panel  Recent Labs   Lab 10/27/21  0523 10/26/21  0637 10/25/21  2113   PROTTOTAL 6.2* 5.8* 5.6*   ALBUMIN 1.8* 1.8* 1.6*   BILITOTAL 0.3 0.3 0.3   ALKPHOS 160* 160* 157*   AST 27 21 21   ALT 71* 71* 84*     INR    Recent Labs   Lab Test 10/27/21  0523 10/26/21  0637 10/25/21  1609   INR 1.16* 1.26* 1.27*      Lipid Profile    Recent Labs   Lab Test 10/22/21  0407 10/21/21  0354 10/20/21  0308 10/19/21  0338 09/07/21  1324   CHOL  --   --   --   --  214*   HDL  --   --   --   --  51   LDL  --   --   --   --  130*   TRIG 307* 486* 383*   < > 364*    < > = values in this interval not displayed.     A1C    Recent Labs   Lab Test 09/07/21  0928 09/05/21  1901   A1C 6.6* 6.5*     Troponin I    Recent Labs   Lab 10/22/21  0958   TROPONIN 0.807*

## 2021-10-27 NOTE — PLAN OF CARE
Changes this shift: Neuro checks done Q1H. Occasional movement in L hand, but not to command. Possible posturing noted with suctioning. Continues to have RRs in the upper 20s to mid 30s. PRN dilaudid given once with no improvement in RR. CT with minimal output, large amount of drainage around sites and out of old CT sites. Insulin gtt started, currently on algorithm 4. Heparin infusing at 850 units/hr. Large watery BM during evening, rectal pouch applied. Back noted to have a large rash, Dr. Green aware overnight, no changes at this time.      Plan: Trach and peg in near future. Continue to monitor and notify MD with pertinent changes.

## 2021-10-27 NOTE — PROGRESS NOTES
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  Course c/b CVA, encephalopathy, acute respiratory failure, acute kidney injury, fungemia.    OVERNIGHT EVENTS:  naoe    TODAY'S PROGRESS:   - pull apical chest tubes  - increase free water flushes to 250 ml Q4H    PLAN:  Neuro/ pain/ sedation:  Acute postoperative pain  Anxiety and depression  Acute to subacute CVA, b/l cerebral hemispheres and L cerebellum  Encephalopathy and diffuse weakness  - Pain: prn oxy   - Sedation: off  - PTA escitalopram on hold     Pulmonary care:   SALTY on home CPAP  Acute hypoxic respiratory failure  S/p b/l lung transplant 10/16/21  - Ventilator Settings:CMV- 18/400/5/55  - Levalbuterol nebs and Mucomyst, chest PT  - Daily CXR  - PST BID  - Titrate supplemental oxygen to maintain saturation above 92%.  - Pulmonary hygiene: Incentive spirometer every 15-30 minutes when awake, flutter valve, C&DB  - 2mg bumex TID today, goal net -500-1L  - thoracic consult for trach and peg-j    Cardiovascular:    HTN  Afib   PFO  TC 10/23 no vegetations  Cardiogenic shock - resolved, now hypertensive - now improved   - Monitor hemodynamic status.   - MAP <100, SBP <140  - Amlodipine 10 mg daily. Hydralazine 100 mg oral q6, isordil to 40 tid  - coreg 12.5 BID   - prn labetalol  - Statin: rosuvastatin 10mg  - ASA  - Amiodarone 400 mg BID  - low-intensity heparin gtt    GI care/ Nutrition:   Elevated LFTs- resolved   GI bleed - NG trauma per GI, resolved   - Diet: goal TF via NJ   - PPI BID  - Continue bowel regimen: miralax, senna    Renal/ Fluid Balance/ Electrolytes:   Acute kidney injury - resolving  BL creat appears to  be ~ 0.7  - bumex 2 TID for goal net negative -500-1L    - Strict I/O, daily weights  - Avoid/limit nephrotoxins as able  - Replete lytes PRN per protocol     Endocrine:    Stress induced hyperglycemia with steroid-induced component, on DM2  Hypothyroidism  RA  Preop A1c 6.6  - sliding scale insulin, transition to high intensity   - Goal BG <180 for optimal healing  - PTA meds: Solumedrol 125 mg q6, Synthroid 25 mcg  - Received 2 doses of Rituximab 6/21  - Rheumatology consult     ID/ Antibiotics:  Immunosuppression  Stress induced leukocytosis  Fungemia, source unclear, likely lung   - Micafungin discontinued  - Nystatin, Acyclovir, bactrim   - Tacrolimus, prednisone  - Continue to monitor fever curve, WBC and inflammatory markers as appropriate  - Follow up cultures   - appreciate transplant ID  - appreciate Ophthalmology   - check c diff   - fluconazole      Heme:     Acute blood loss anemia  Hypogammaglobulinemia s/p IVIG  Thrombocytopenia, HIT negative - resolved   No s/sx active bleeding  - CBC   - ID as above    MSK/ Skin:  Sternotomy  Surgical Incision  - Sternal precautions  - Postoperative incision management per protocol  - PT/OT/CR     Prophylaxis:    - Mechanical prophylaxis for DVT: SCDs  - Chemical DVT prophylaxis: Heparin gtt  - PPI for 30 days postoperatively     Lines/ tubes/ drains:  - ETT(10/16)  - Watson 10/25   - Chest tubes- 2 pleural, 1 mediastinal(10/16)  - OG (10/16)   - NJ (10/18)     Disposition:  - [facility] ICU.      Patient seen, findings and plan discussed with CV ICU staff.     Moises Ribera  PGY-3 Anesthesiology      ====================================    SUBJECTIVE:   Opens eyes slightly to voice     OBJECTIVE:   1. VITAL SIGNS:   Temp:  [37.2  C (99  F)-38.8  C (101.8  F)] 37.4  C (99.3  F)  Pulse:  [76-97] 82  Resp:  [22-55] 23  BP: ()/(47-91) 122/71  FiO2 (%):  [50 %-60 %] 50 %  SpO2:  [95 %-99 %] 99 %  Ventilation Mode: (S) CPAP/PS  (Continuous positive airway pressure with  Pressure Support)  FiO2 (%): 50 %  Rate Set (breaths/minute): 18 breaths/min  Tidal Volume Set (mL): 400 mL  PEEP (cm H2O): 5 cmH2O  Pressure Support (cm H2O): 7 cmH2O  Oxygen Concentration (%): 50 %  Resp: 23      2. INTAKE/ OUTPUT:   I/O last 3 completed shifts:  In: 2186.67 [I.V.:376.67; NG/GT:530]  Out: 2790 [Urine:2275; Emesis/NG output:400; Stool:100; Chest Tube:15]    3. PHYSICAL EXAMINATION:   General: intubated  Neuro: opens eyes to name, not following commands, no motor observed   Resp: overbreathing vent   CV: regular/tachy on tele  Abdomen: nondistended, soft   Incisions: c/d/i  Extremities: warm and well perfused, no edema  CTs serosanguinous     4. INVESTIGATIONS:   Arterial Blood Gases   Recent Labs   Lab 10/27/21  0335 10/26/21  0522 10/25/21  1136 10/25/21  0825   PH 7.50* 7.56* 7.51* 7.52*   PCO2 36 30* 34* 34*   PO2 130* 92 87 104   HCO3 28 27 27 28     Complete Blood Count   Recent Labs   Lab 10/27/21  0523 10/26/21  1020 10/25/21  0326 10/24/21  0410   WBC 37.3* 35.8* 32.8* 21.4*   HGB 10.8* 9.9* 10.6* 9.8*    341 407 201     Basic Metabolic Panel  Recent Labs   Lab 10/27/21  0621 10/27/21  0523 10/27/21  0334 10/27/21  0214 10/27/21  0017 10/26/21  2338 10/26/21  1717 10/26/21  1452 10/26/21  0931 10/26/21  0637 10/26/21  0145 10/25/21  2113   NA  --  152*  --   --   --  150*  --  151*  --  148*   < > 146*   POTASSIUM  --  4.4  --   --   --   --   --  5.0  --  4.1  --  4.7   CHLORIDE  --  120*  --   --   --   --   --  121*  --  117*  --  116*   CO2  --  27  --   --   --   --   --  25  --  25  --  25   BUN  --  60*  --   --   --   --   --  63*  --  62*  --  62*   CR  --  0.90  --   --   --   --   --  1.16  --  1.11  --  1.26*   * 208* 194* 203*   < >  --    < > 244*   < > 206*   < > 192*    < > = values in this interval not displayed.     Liver Function Tests  Recent Labs   Lab 10/27/21  0523 10/26/21  0637 10/25/21  2113 10/25/21  1609 10/25/21  0825 10/25/21  0326   AST 27 21 21  20   < > 22   ALT 71* 71* 84* 88*   < > 111*   ALKPHOS 160* 160* 157* 163*   < > 175*   BILITOTAL 0.3 0.3 0.3 0.5   < > 0.3   ALBUMIN 1.8* 1.8* 1.6* 1.6*   < > 1.7*   INR 1.16* 1.26*  --  1.27*  --  1.23*    < > = values in this interval not displayed.     Pancreatic Enzymes  No lab results found in last 7 days.  Coagulation Profile  Recent Labs   Lab 10/27/21  0523 10/26/21  0637 10/25/21  1609 10/25/21  0326 10/23/21  0344 10/22/21  1407   INR 1.16* 1.26* 1.27* 1.23*   < > 1.28*   PTT  --   --   --   --   --  43*    < > = values in this interval not displayed.         5. RADIOLOGY:   Recent Results (from the past 24 hour(s))   MR Brain for Stroke Cmpl w/o & w Contras    Narrative    MRI brain without and with contrast  MRA of the head without contrast  Neck MRA without and with contrast    Provided History:  follow up for stroke. concern for microhemorrhages.    Comparison:  MR brain 10/23/21    Technique:   Brain MRI:  Axial diffusion, FLAIR, T2-weighted, susceptibility, and  coronal T1-weighted images were obtained without intravenous contrast.  Following intravenous gadolinium-based contrast administration, axial  and coronal T1-weighted images were obtained.    Head MRA: 3D time-of-flight MRA of the [place] was performed  without intravenous contrast.  Neck MRA:  Limited non contrast 2DTOF images were obtained of the  mid-cervical region. Following intravenous gadolinium-based contrast  administration, a contrast enhanced MRA of the neck/cervical vessels  was performed.  Three-dimensional reconstructions of the neck and head MRA were  created, which were reviewed by the radiologist.    Dose: 7 mL gadavist    Findings:   Brain MRI: Evolving infarction within the left occipital lobe, right  occipital lobe, bilateral frontal and parietal lobes, and left  cerebellum without significant change compared to prior. These areas  continue to demonstrate restricted diffusion and increased T2 signal  on FLAIR.  There continues to be T1 hyperintensity within the areas of  infarct, most prominent in the left parieto-occipital lobe. No new  evidence of restricted diffusion seen on today's examination. No  evidence of significant mass effect.    On the FLAIR images, there is nonspecific scattered periventricular T2  hyperintensities without associated diffusion restriction, which are  most likely related to chronic small vessel ischemic disease.     On susceptibility images, the scattered punctate regions of  susceptibility effect corresponding to areas of restricted diffusion  are minimally increased compared to prior, most prominent in the  parieto-occipital lobes suggesting microhemorrhages.     Ventricles are proportional to cerebral sulci. No midline shift. No  abnormal extra axial fluid collection identified. Paranasal sinus  mucosal thickening is again seen. Bilateral mastoid effusions.     Head MRA demonstrates no definite aneurysm or stenosis of the major  intracranial arteries.     Neck MRA demonstrates patent major cervical arteries. Antegrade flow  in the major cervical vasculature.      Impression    Impression:  1. Evolving acute age multifocal acute infarctions in both cerebral  hemispheres and left cerebellum. No new areas of infarction when  compared with prior MRI brain on 10/23/2021.  2. Minimally increased punctate regions of susceptibility effect  within areas of infarction, corresponding to petechial hemorrhage  within previously identified multifocal acute infarctions.  3. Head MRA demonstrates no definite aneurysm or stenosis of the major  intracranial arteries.  4. Neck MRA demonstrates patent major cervical arteries.    I have personally reviewed the examination and initial interpretation  and I agree with the findings.    YAN PRABHAKAR MD         SYSTEM ID:  [id number]   XR Chest Port 1 View    Impression    RESIDENT PRELIMINARY INTERPRETATION  IMPRESSION:   1. Slightly improved perihilar and basilar  opacities, likely  postoperative atelectasis.  2. Endotracheal tube is in borderline low position. Consider slight  retraction.  3. Additional support devices stable.       =========================================    Critical Care Services Progress Note:     Edson Thornton remains critically ill with hypoxic respiratory failure, mental status changes associated with most likely embolic stroke     I personally examined and evaluated the patient today.   The patient s prognosis today is tentative  I have evaluated all laboratory values and imaging studies from the past 24 hours.  Key findings and decisions made today included Patient remains overbreathing vent, will discuss with thoracic team the timeing of a trach. Likely neurologic causes of overbreathing. Discussed with neurology the formal dx of PRES awaiting Radiology read however unlikely. Restarted heparin given the concern for embolic nature of these strokes due to the distribution and known PFO. Outcome remains gaurded   I personally managed the ventilator, sedation, pain control and analgesia and nutritional status.   Consults ongoing and ordered are Surgery  Procedures that will happen today are: none  All treatments were placed at my direction.  I formulated today s plan with the house staff team or resident(s) and agree with the findings and plan in the associated note.       The above plans and care have been discussed with family and all questions and concerns were addressed.     I spent a total of 36 minutes (excluding procedure time) personally providing and directing critical care services at the bedside and on the critical care unit for Edson Thornton.        Ronnie Ron MD

## 2021-10-27 NOTE — CONSULTS
Patient is a 56 year old male with fungemia and subacute ischemic stroke, now admitted to ICU. Per chart, change in exam in the last 24 hours with decreased responsiveness to verbal or painful stimuli. Patient's team requesting lumbar puncture.     Per protocol, LP to be performed by medicine/ICU team, with Neurology consultation if failed bedside LP.    Case discussed with primary team.    Jesse Carter PA-C  Interventional Radiology  [phone] pgr.

## 2021-10-27 NOTE — PROGRESS NOTES
Pulmonary Medicine  Cystic Fibrosis - Lung Transplant Team  Progress Note  10/27/2021       Patient: Edson Thornton  MRN: [id number]  : 1965 (age 56 year old)  Transplant: 10/16/2021 (Lung), POD#11  Admission date: 2021    Assessment & Plan:     Edson Thornton is a 56 year old male with a PMH significant for NSIP/ILD, bronchiectasis, moderate PH, RA, SALTY, chronic HSV infection, hypogammaglobulinemia, steroid-induced diabetes, hypothyroidism, PFO, HTN, HLD, duodenal anomaly, anxiety, and depression.  Admitted on 21 from OSH for acute on chronic respiratory failure 2/2 ILD exacerbation, now s/p BSLT on 10/16/21.  Surgery relatively uncomplicated but pt. with low cardiac index and PGD immediately post-op.  Caroline weaned off 10/22, remains intubated.  Post-op course complicated by encephalopathy and diffuse weakness, acute to subacute CVA, afib with RVR, BRENNAN, GI bleed due to NJ/OG trauma, Candidemia, and recurring fevers.  Neuro status remains tenuous.     Today's recommendations:   - Plan for trach and PEG/J tube with thoracic surgery this week (TBD, primary team to reach out to thoracic for more specific OR plan)  - Recommend to keep at least one chest tube given empyema  - Tacrolimus level today to trend, no dose adjustment, daily levels to trend and will increase tomorrow if indicated  - Prednisone taper due 10/31 (ordered)  - CMV and EBV   - Coccidioides Ag   - Following pending respiratory and pleural cultures  - Recommend LP given recurrent fevers  - Low threshold for empiric ABX coverage for positive gram stain  - Repeat IgG   - DSA from 10/23 invalid with recent IVIG, repeat ordered   - Donor risk labs 11/15     S/p BSLT for ILD:  Acute hypoxic/hypercapneic respiratory failure:   Pulmonary edema:  Subcutaneous emphysema: Unfortunately had not received vaccination for flu, PNA, or COVID-19 PTA.  Explant pathology with NSIP, no malignancy.  PGD 2-3.  Weaned off paralytic  10/19 (for vent dyssynchrony), pressors 10/21 (now hypertensive), and Caroline 10/22.  Initial difficulty weaning sedation given agitation then with neurological findings as below.  CXR initially post-op with pulmonary edema, aggressively diuresed.  Recurring fevers post-op, see ID section below.  Bronch 10/20 with tan secretions to LLL, repeat 10/23 with frothy clear secretions in right TB tree.  Chest CT (10/25) with interval improvement in consolidative opacities to BLL and KARI with trace right hydroPTX (personally reviewed).  Bronch (10/26) with minimal sticky secretions, anastomosis intact.  Remains intubated and with concern for CVA as below.  - Nebs: levalbuterol and Mucomyst QID  - Aggressive pulmonary toilet with chest physiotherapy QID  - Ventilator management and PST per ICU team  - Plan for trach and PEG/J tube with thoracic surgery (TBD, primary team to reach out to thoracic for more specific OR plan), tube feeds via NJ for now  - Chest tube management per CVTS, recommend to keep at least one chest tube given empyema  - Agree with ongoing diuresis as tolerated  - CXR today with ongoing slight improvements in left pleural effusion and stable small right pleural effusion (personally reviewed)     Immunosuppression: s/p induction therapy with basiliximab 10/16 (and high dose IV steroid) and 10/20  - Tacrolimus 1.5 mg BID suspension (decreased 10/26 with fluconazole, recently held 10/23-10/25 due to supratherapeutic levels).  Goal level 8-12.  Level today to trend, level low at 7 but interpretation difficult given very recent fluconazole initiation, defer dose adjustment today and continue daily levels.  - MMF 1500 mg BID (on PTA, AZA to be avoided given TPMT)  - Prednisone 15 mg BID with next taper on 10/31 (ordered) per lung transplant protocol:  Date AM dose (mg) PM dose (mg)   10/24/21 15 15   10/31/21 15 12.5   11/7/21 12.5 12.5   11/21/21 12.5 10   12/5/21 10 10   1/2/22 10 7.5   1/30/22 7.5 7.5   2/27/22  7.5 5   3/27/22 5 5   4/24/22 5 2.5      Prophylaxis:   - Bactrim for PJP ppx (10/25, held prior d/t BRENNAN)  - Nystatin for oral candidiasis ppx, 6 month course  - See below for serologies and viral ppx:    Donor Recipient Intervention   CMV status Negative Negative None, CMV monthly (ordered 11/16)   EBV status Positive Positive None, EBV monthly (ordered 11/16)   HSV status N/A Positive Acyclovir POD #1-30   (recent infection history)      ID: Concern for possible Strongyloides exposure pre-transplant s/p ivermectin x1 dose (9/17).  Donor and recipient cultures NGTD.  S/p IV ceftazidime/vancomycin for 48h per protocol and additional empiric ceftazidime 10/19-10/23 given recurrent fevers.  Bronch cultures 10/17 with Staph epi.  Cryptococcal Ag negative 10/23.    - Bronch cultures (10/26) NGTD  - Monthly Coccidioides Ag x12 months post-transplant per ID (urine and serum negative 10/17), due 11/17 (ordered)     Recurring fevers:  Fevers post-op, Tmax 101.7 POD #1.  Febrile with worsening leukocytosis again 10/25.  - Trach sputum culture (10/25) with GPC, following  - Pleural cultures (10/25) with 3+ WBC, following  - Recommend LP given recurrent fevers  - Low threshold for empiric ABX coverage for positive gram stain     Disseminated Candida:  Noted on 1 of 2 blood cultures 10/20, repeat + blood culture 10/22 as below.  BDG fungitell positive (399) on 10/20.  Respiratory cultures with persistent Candida albicans (10/17, 10/20, 10/22).  TC 10/23 without evidence of endocarditis.   Ophthalmology consult 10/24 with benign dilated fundoscopic exam.  Candida empyema also noted 10/25.  - Blood culture (10/25) with + yeast (1 of 2, venipuncture), following  - Repeat blood cultures (10/23, 10/25) NGTD  - Fluconazole (10/26, prior micafungin 10/22-10/26), duration TBD pending clinical course     HSV: Chronic intermittent active infection pre-transplant with recent HSV infection: crusted lesions throughout left side of jaw, s/p  10 day treatment course of ACV through 10/9.  HSV PCR blood negative 10/17.  - ACV ppx as above (started POD #1 instead of POD #8 given HSV history and location)     Hypogammaglobulinemia: IgG previously low at 364 (9/7).  Noted at 265 at time of transplant, s/p IVIG with premedication 10/21.    - Repeat IgG 11/17 (ordered)     Positive cross match: Note that he received two doses of rituximab in June, which is likely contributing to cross match result.  DSA negative 10/17, repeat DSA 10/23 invalid with recent IVIG (as above).  - Repeat DSA 11/1 (ordered), monitoring q2 weeks     PHS risk criteria donor:  Additional labs required post-transplant (between 4-8 weeks post-op): Hepatitis B, Hepatitis C, and HIV by VISHAL (ordered 11/15).     SALTY: Noted pre-transplant.  Home CPAP 6-12 cm H2O.  - Resume BiPAP at night post-extubation     Other relevant problems being managed by primary team:     Acute to subacute CVA:   Encephalopathy and diffuse weakness: Stroke code 10/22 d/t limited movement of BLE, CT head with infarcts in the bilateral cerebral hemispheres and left cerebella hemisphere (presumed embolic), no acute intracranial hemorrhage.  MRI (10/23) with multifocal subacute infarcts within both cerebral hemispheres and left cerebellum.  EEG without seizures 10/22, ammonia normal.  DDx include surgery v embolic v infectious (see above regarding ID work up).  Heparin drip started 10/23.   Repeat stroke code 10/25 with marked decrease in responsiveness with sedation wean.  Pupils not equal (3 mm R, 2 mm L with extropia) and gag reflex initially absent.  CTA head without obvious new pathology, MRI brain (with and without contrast) primarily revealing for infarct, low likelihood of PRES.  - Heparin management per neurology and primary     Afib with RVR: Noted 10/18, started on amiodarone drip and transitioned to PO 10/21.  Currently in SR.  Heparin drip started 10/23 as above.     BRENNAN, Improving: Baseline creatinine 0.7.  " BRENNAN noted POD #1, UO also downtrending.  Improving with aggressive diuresis.     Elevated LFTs, Improving: Shock liver post-op.  Initial ALT//230 evening of surgery, then with marked increase to 1550/1246 POD #1.  Gradual improvement in ALT/AST, alk phos with persistent mild elevation.     GI bleed, Resolved: Attributed to mechanical trauma.  Hemoglobin dropped to 6.6 10/22, s/p 2 units pRBC.  OG tube with bloody output, EGD 10/23 noted NJ/OG tube trauma with scant oozing.  IV PPI BID.  Hemoglobin now stable.     We appreciate the excellent care provided by the CVICU and CVTS teams.  Recommendations communicated via in person rounding and this note.  Will continue to follow along closely, please do not hesitate to call with any questions or concerns.     Patient discussed with Dr. Pereira.    Renetta Holloway, DNP, APRN, CNP  Inpatient Nurse Practitioner  Pulmonary CF/Transplant     Subjective & Interval History:     Mental status improved some yesterday per nursing and neuro, but now again this morning without intentional response to deep stimulation/pain and minimal to no gag response with deep suctioning.  Remains on full vent support, PST at 7/5 for 4 hours yesterday, still overbreathing the vent.  Has remained off sedation and narcotic infusion since 10/25 evening, infrequent prn IV doses of narcotic.    Review of Systems:     ROS as above, otherwise severely limited by intubation and encephalopathy.    Physical Exam:     Vital signs:  Temp: 99.9  F (37.7  C) Temp src: Bladder BP: 110/68 Pulse: 84   Resp: (!) 35 SpO2: 96 % O2 Device: Mechanical Ventilator Oxygen Delivery: 15 LPM Height: 162.6 cm (5' 4\") Weight: 71.8 kg (158 lb 4.6 oz)  I/O:     Intake/Output Summary (Last 24 hours) at 10/27/2021 1135  Last data filed at 10/27/2021 1000  Gross per 24 hour   Intake 2433.67 ml   Output 2917 ml   Net -483.33 ml     Constitutional: Lying supine, in mild distress.   HEENT: Eyes with pink conjunctivae, anicteric.  " Pupils 3-4 mm and responsive equally, no exotropia.  Orally intubated.  PULM: Good air flow bilaterally.  Minimal crackles, no rhonchi, no wheezes.  Mildly labored breathing on CMV 18/400/5/60 (overbreathing at rate of 30-32).  CV: Normal S1 and S2.  RRR.  No murmur, gallop, or rub.  Trace BUE edema.   ABD: NABS, soft, nontender, nondistended.  Rectal tube in place with brown output.  MSK: + muscle wasting.   NEURO: Not responsive to painful stimuli.   SKIN: Warm, dry.  No rash on limited exam. Superficial wound vac in place to clamshell incision.  PSYCH: Mood calm.     Lines, Drains, and Devices:  Peripheral IV 10/22/21 Left;Posterior Lower forearm (Active)   Site Assessment WDL 10/27/21 0800   Line Status Infusing 10/27/21 0800   Dressing Intervention New dressing  10/27/21 0800   Phlebitis Scale 0-->no symptoms 10/27/21 0800   Infiltration Scale 0 10/27/21 0800   Number of days: 5       Peripheral IV 10/25/21 Right;Anterior;Lateral Lower forearm (Active)   Site Assessment WDL 10/27/21 0800   Line Status Saline locked 10/27/21 0800   Dressing Intervention New dressing  10/27/21 0800   Phlebitis Scale 0-->no symptoms 10/27/21 0800   Infiltration Scale 0 10/27/21 0800   Infiltration Site Treatment Method  None 10/27/21 0800   If infiltrated, was a vesicant infusing? No 10/27/21 0800   Number of days: 2       Peripheral IV 10/25/21 Anterior;Distal;Right Upper arm (Active)   Site Assessment [facility] 10/27/21 0800   Line Status Saline locked 10/27/21 0800   Dressing Intervention Dressing reinforced 10/27/21 0800   Phlebitis Scale 0-->no symptoms 10/27/21 0800   Infiltration Scale 0 10/27/21 0800   Infiltration Site Treatment Method  None 10/27/21 0400   Number of days: 2     Data:     LABS    CMP:   Recent Labs   Lab 10/27/21  1106 10/27/21  1007 10/27/21  0807 10/27/21  0621 10/27/21  0523 10/27/21  0334 10/27/21  0017 10/26/21  2338 10/26/21  1717 10/26/21  1452 10/26/21  0931 10/26/21  0637 10/26/21  0145 10/25/21  0853  10/25/21  1941 10/25/21  1609   NA  --   --   --   --  152*  --   --  150*  --  151*  --  148*   < > 146*   < > 146*   POTASSIUM  --   --   --   --  4.4  --   --   --   --  5.0  --  4.1  --  4.7   < > 4.7   CHLORIDE  --   --   --   --  120*  --   --   --   --  121*  --  117*  --  116*   < > 114*   CO2  --   --   --   --  27  --   --   --   --  25  --  25  --  25   < > 26   ANIONGAP  --   --   --   --  5  --   --   --   --  5  --  6  --  5   < > 6   * 220* 154* 172* 208*   < >   < >  --    < > 244*   < > 206*   < > 192*   < > 166*   BUN  --   --   --   --  60*  --   --   --   --  63*  --  62*  --  62*   < > 59*   CR  --   --   --   --  0.90  --   --   --   --  1.16  --  1.11  --  1.26*   < > 1.18   GFRESTIMATED  --   --   --   --  >90  --   --   --   --  70  --  74  --  63   < > 69   ERIK  --   --   --   --  9.1  --   --   --   --  8.6  --  8.9  --  8.6   < > 8.7   MAG  --   --   --   --  2.0  --   --   --   --  1.8  --  1.9  --   --   --  1.8   PHOS  --   --   --   --  2.6  --   --  3.2  --  3.6  --  3.0  --   --    < > 3.0   PROTTOTAL  --   --   --   --  6.2*  --   --   --   --   --   --  5.8*  --  5.6*  --  5.7*   ALBUMIN  --   --   --   --  1.8*  --   --   --   --   --   --  1.8*  --  1.6*  --  1.6*   BILITOTAL  --   --   --   --  0.3  --   --   --   --   --   --  0.3  --  0.3  --  0.5   ALKPHOS  --   --   --   --  160*  --   --   --   --   --   --  160*  --  157*  --  163*   AST  --   --   --   --  27  --   --   --   --   --   --  21  --  21  --  20   ALT  --   --   --   --  71*  --   --   --   --   --   --  71*  --  84*  --  88*    < > = values in this interval not displayed.     CBC:   Recent Labs   Lab 10/27/21  0523 10/26/21  1020 10/25/21  0326 10/24/21  0410   WBC 37.3* 35.8* 32.8* 21.4*   RBC 3.58* 3.28* 3.53* 3.24*   HGB 10.8* 9.9* 10.6* 9.8*   HCT 35.3* 32.0* 33.1* 30.5*   MCV 99 98 94 94   MCH 30.2 30.2 30.0 30.2   MCHC 30.6* 30.9* 32.0 32.1   RDW 16.0* 16.2* 15.9* 16.4*    341 407 201        INR:   Recent Labs   Lab 10/27/21  0523 10/26/21  0637 10/25/21  1609 10/25/21  0326   INR 1.16* 1.26* 1.27* 1.23*       Glucose:   Recent Labs   Lab 10/27/21  1106 10/27/21  1007 10/27/21  0807 10/27/21  0621 10/27/21  0523 10/27/21  0334   * 220* 154* 172* 208* 194*       Blood Gas:   Recent Labs   Lab 10/27/21  0335 10/26/21  0522 10/25/21  2113 10/24/21  0010 10/23/21  2108 10/23/21  2016 10/23/21  1543   PHV  --   --  7.47*  --  7.42  --  7.44*   PCO2V  --   --  36*  --  45  --  46   PO2V  --   --  78*  --  36  --  36   HCO3V  --   --  27  --  29*  --  31*   LORI  --   --  2.9*  --  4.1*  --  6.5*   O2PER 60 55 55   < > 50   < > 50  50    < > = values in this interval not displayed.       Culture Data No results for input(s): CULT in the last 168 hours.    Virology Data:   Lab Results   Component Value Date    FLUAH1 Negative 10/17/2021    FLUAH3 Negative 10/17/2021    WO3986 Negative 10/17/2021    IFLUB Negative 10/17/2021    RSVA Negative 10/17/2021    RSVB Negative 10/17/2021    PIV1 Negative 10/17/2021    PIV2 Negative 10/17/2021    PIV3 Negative 10/17/2021    HMPV Negative 10/17/2021    HRVS Negative 10/17/2021    ADVBE Negative 10/17/2021    ADVC Negative 10/17/2021       Historical CMV results (last 3 of prior testing):  No results found for: CMVQNT  No results found for: CMVLOG    Urine Studies    Recent Labs   Lab Test 10/25/21  1507 10/22/21  1641   URINEPH 5.5 7.5*   NITRITE Negative Negative   LEUKEST Negative Negative   WBCU 2 3       Most Recent [place] Pulmonary Function Testing (FVC/FEV1 only)  No results found for: 20002  No results found for: 20003  No results found for: 20015  No results found for: 20016    IMAGING    Recent Results (from the past 48 hour(s))   CT Chest/Abdomen/Pelvis w Contrast   Result Value    Radiologist flags Nonocclusive venous thrombosis of the low right (Urgent)    Narrative    EXAMINATION: CT CHEST/ABDOMEN/PELVIS W CONTRAST, 10/25/2021 12:15  PM    TECHNIQUE:  Helical CT images from the lung apices through the  symphysis pubis were obtained with contrast.  Coronal and sagittal  reformatted images were generated at a workstation for further  assessment.    CONTRAST:  99 ml Isovue 370.    COMPARISON: Chest CTA and CT abdomen and pelvis 10/22/2021, same-day  chest radiograph, right upper extremity venous ultrasound 10/19/2021    HISTORY: Sepsis    FINDINGS:  Images limited by motion artifact.    Lines and tubes: Endotracheal tube tip in the mid thoracic trachea.  Enteric tube tip in the stomach. Right upper extremity PICC tip at the  superior cavoatrial junction. Mediastinal drain tips overlying the  arch. Bilateral chest tubes in similar position with right inferior  chest tube tip in similar position in the right 8th-9th intercostal  space, not definitively in the right pleural space. Watson catheter  within the bladder.    Lungs: Postoperative changes of bilateral lung transplantation. Trace  right hydropneumothorax. No left pneumothorax. Small left pleural  effusion. Associated compressive atelectasis bilaterally. Decreased  consolidative opacities in the posterolateral left upper lobe and  bilateral lung bases. Diffuse bronchovascular thickening.  Heart and mediastinum: Trace dependent debris in the trachea. Central  tracheobronchial tree is otherwise patent. Heart size is normal. No  significant pericardial effusion. Mild coronary artery calcifications.  Thoracic vasculature: Normal caliber thoracic aorta. Normal caliber  pulmonary artery. Nonocclusive thrombosis of the low right internal  jugular vein.  Lymph nodes: No suspicious lymphadenopathy.  Thyroid: No suspicious nodules.    Liver: Unchanged subcentimeter hypodensity in the right hepatic lobe  (series 3, image 263), too small to characterize. No suspicious  lesions. Portal veins appear patent.  Gallbladder: No cholelithiasis or evidence of acute cholecystitis. No  intra- or extra-hepatic  biliary ductal dilatation.  Spleen: Unremarkable.  Pancreas: Similar density at the pancreaticoduodenal groove, otherwise  unremarkable.  Adrenal glands: No discrete nodules.  Kidneys: No hydronephrosis. No nephrolithiasis. No suspicious mass.  Bladder / Pelvic organs: Bladder is decompressed with Watson. No pelvic  mass.  Bowel: Hyperdense colonic contents. No evidence of obstruction. The  appendix is unremarkable. Colonic diverticulosis without evidence of  acute diverticulitis.  Lymph nodes: No suspicious lymphadenopathy.  Peritoneum / Retroperitoneum: No pneumoperitoneum. No organized fluid  collections.  Abdominal vasculature: Major vascular structures of the abdomen are  patent and normal in caliber.    Bones and soft tissues: Degenerative changes of the visualized spine.  No acute osseous abnormalities or suspicious bony lesions.  Subcutaneous and intramuscular emphysema along the anterior chest wall  and inferior neck. Clamshell sternotomy wires are intact. Trace  anasarca.      Impression    IMPRESSION:   1. Decreased consolidative opacities in the posterolateral left upper  lobe and bilateral lung bases, which may represent improving infection  and/or atelectasis.  2. Nonocclusive venous thrombosis of the low right internal jugular  vein.  3. Decreased trace right hydropneumothorax and small left pleural  effusion.  4. Support devices are in similar position, including the right  inferior chest tube tip near the 8th-9th intercostal space, not  definitively in the pleural space.   5. Decreased anterior chest subcutaneous/intramuscular emphysema.  6. The remainder of the exam is not significantly changed since  10/22/2021.    [Urgent Result: Nonocclusive venous thrombosis of the low right  internal jugular vein.]    Finding was identified on 10/25/2021 12:58 PM.     Dr. Elias was contacted by Dr. Llamas at 10/25/2021 1:38 PM and  verbalized understanding of the urgent finding.     I have personally reviewed  the examination and initial interpretation  and I agree with the findings.    JOSEE ROMERO MD         SYSTEM ID:  [id number]   XR Feeding Tube Placement    Narrative    Feeding tube placement: 10/25/2021 3:17 PM    Comparison: CT 10/25/2021. Radiograph 10/23/2021.    Indication: Feeding tube placement    Fluoroscopy time: 2.3 minutes    Technique: After injection of Xylocaine gel into the left nostril, a  feeding tube was advanced under fluoroscopic guidance.    Findings: The feeding tube was advanced with the tip in the third  portion of the duodenum. A small amount of barium was injected to  demonstrate placement within the small bowel. The feeding tube was  then flushed with normal saline. The feeding tube was secured using  tape on the nasal bridge and Tegaderm on the cheek. Small amount of  contrast overlying the proximal duodenum may represent a small  diverticulum containing contrast when correlated to prior CT and  radiograph.      Impression    Impression: Uncomplicated feeding tube placement with tip in the third  portion of the duodenum.    I, KAMLA SHIN MD, attest that I was immediately available to  provide guidance and assistance during the entirety of the procedure.  Radiologist was not present directly for tube placement as was  performed portable in the ICU.    I have personally reviewed the examination and initial interpretation  and I agree with the findings.    KAMLA SHIN MD         SYSTEM ID:  [id number]   XR Chest Port 1 View    Narrative    XR CHEST PORT 1 VIEW  10/25/2021 6:09 PM      HISTORY: s/p chest tube pull    COMPARISON: Chest x-ray 10/25/2021.    FINDINGS:   Portable AP 20 degree upright chest x-ray. Postsurgical changes of the  chest. Clamshell sternotomy wires are intact. Endotracheal tube tip  terminates over the low trachea. Enteric orogastric tubes are  partially visualized. Mediastinal drain. Bibasilar chest tubes.  Interval removal of the apically directed chest  tubes.    Trachea is midline. Cardiac silhouette is enlarged. Pulmonary  vasculature is indistinct. Persistent bilateral perihilar interstitial  opacities. Small left pleural effusion with increased left basilar  opacities. No appreciable pneumothorax.    Osseous structures are intact. Visualized soft tissues and upper  abdomen are unremarkable.      Impression    IMPRESSION:   1. Interval removal of apically directed chest tubes. No appreciable  pneumothorax.  2. Cardiomegaly with persistent bilateral interstitial opacities  suggestive for pulmonary edema versus atelectasis.  3. Small left pleural effusion with increased left basilar atelectasis  versus consolidation.  4. Additional lines and tubes in stable position.    I have personally reviewed the examination and initial interpretation  and I agree with the findings.    ANUPAM GASTON DO         SYSTEM ID:  [id number]   CT Head w/o Contrast    Narrative    EXAM: CT HEAD W/O CONTRAST  LOCATION: [facility]  DATE/TIME: 10/25/2021 9:44 PM    INDICATION: Code Stroke to evaluate for potential thrombolysis and thrombectomy.  Stopped opening eyes to speak, unresponsive, no longer has cough when suctioned. Possible stroke/cerebral infarct. Confusion. Altered mental status.  COMPARISON: MRI brain 10/23/2021  TECHNIQUE: Routine CT Head without IV contrast. Multiplanar reformats. Dose reduction techniques were used.    FINDINGS:  Motion and streak artifact limits aspects of exam.    INTRACRANIAL CONTENTS:   This exam is significantly limited by motion and streak artifact. MRI brain 10/23/2021 demonstrated multiple acute/subacute infarcts throughout the bilateral cerebral hemispheres and left cerebellum. Some of these acute/subacute infarcts demonstrated   microhemorrhage on SWI.    Left posterior parietal lobe acute/subacute infarct is identified. Remaining infarcts are not identified. It is possible that a component of the  abnormal signal on MRI may of been attributed to posterior reversible encephalopathy syndrome.    No CT evidence for a new cortical hypoattenuation to indicate a new acute infarct on current exam.    No definite acute intracranial hemorrhage given artifact. No midline shift. No hydrocephalus.    Mild global brain volume loss.    VISUALIZED ORBITS/SINUSES/MASTOIDS: No intraorbital abnormality. No paranasal sinus mucosal disease. Bilateral mastoid air cells extensively opacified.    BONES/SOFT TISSUES: No acute abnormality. Nasogastric tube.      Impression    IMPRESSION:  This exam is significantly limited by motion and streak artifact.     MRI brain 10/23/2021 demonstrated multiple acute/subacute infarcts throughout the bilateral cerebral hemispheres and left cerebellum. Some of these acute/subacute infarcts demonstrated microhemorrhage on SWI.    Left posterior parietal lobe acute/subacute infarct is identified on current exam. Remaining previously seen infarcts are not identified possibly obscured by artifact. It is possible that a component of the abnormal signal on MRI may of been attributed   to posterior reversible encephalopathy syndrome in addition to acute/subacute infarcts.    No CT evidence for a new cortical hypoattenuation to indicate a new acute infarct on current exam.    No definite acute intracranial hemorrhage given artifact.     No midline shift.     Dr. Kirstin Leos was notified by Dr Lobito Rose at  10:20 PM 10/25/2021.   CTA Head Neck with Contrast    Narrative    EXAM: CTA  HEAD NECK WITH CONTRAST  LOCATION: [facility]  DATE/TIME: 10/25/2021 9:44 PM    INDICATION: Code Stroke to evaluate for potential thrombolysis and thrombectomy. Confusion. Altered mental status. Stopped opening eyes to speak, unresponsive, no longer has cough when suctioned. Stroke/cerebral infarct suspected.  COMPARISON: None.  CONTRAST: iopamidol (ISOVUE-370) solution 75  mL  TECHNIQUE: Head and neck CT angiogram with IV contrast. Axial helical CT images of the head and neck vessels obtained during the arterial phase of intravenous contrast administration. Axial 2D reconstructed images and multiplanar 3D MIP reconstructed   images of the head and neck vessels were performed by the technologist. Dose reduction techniques were used. All stenosis measurements made according to NASCET criteria unless otherwise specified.    FINDINGS:   Motion and streak artifact limits aspects of exam.    HEAD CTA:  ANTERIOR CIRCULATION: No significant stenosis/occlusion, aneurysm, or AVM/AVF. Standard [place] anatomy.    POSTERIOR CIRCULATION: No significant stenosis/occlusion, aneurysm, or AVM/AVF. Dominant left and smaller right vertebral artery contribute to a normal basilar artery.     DURAL VENOUS SINUSES: Not well evaluated on a technical basis.      NECK CTA:  RIGHT CAROTID: No significant stenosis/occlusion. No dissection.    LEFT CAROTID: No significant stenosis/occlusion. No dissection.    VERTEBRAL ARTERIES:  Right vertebral artery mild stenosis. Left vertebral artery origin severe high-grade stenosis.    Remaining bilateral extradural vertebral arteries demonstrate no significant stenosis/occlusion. No dissection.      Dominant left and smaller right vertebral arteries.    AORTIC ARCH: Suboptimally visualized on this exam.    ARTERIAL PLAQUE: Scattered calcified/noncalcified plaque within the arterial system.    NONVASCULAR STRUCTURES:   Scattered foci of air within the venous system nonspecific likely iatrogenic. Endotracheal tube. Nasogastric tube. Left upper lobe posterior segment dependent patchy opacities. Degenerative changes noted within the spine.        Impression     IMPRESSION:   HEAD CTA:   1.  No intracranial arterial large vessel occlusion.  2.  No significant stenosis/occlusion.    NECK CTA:  1.  Right vertebral artery mild stenosis.   2.  Left vertebral artery origin  severe high-grade stenosis.  3.  Otherwise, no significant stenosis/occlusion. No dissection.    Dr. Kirstin Leos was notified by Dr Lobito Rose at  10:35 PM 10/25/2021.    XR Chest Port 1 View    Narrative    EXAM: XR CHEST PORT 1 VIEW  10/26/2021 1:05 AM     HISTORY:  s/p lung transplant       COMPARISON:  Radiograph and CT 10/25/2021    TECHNIQUE: Portable AP radiograph of the chest.    FINDINGS:   Endotracheal tube tip projects over the midthoracic trachea. Feeding  tube and enteric tube course below the diaphragm and beyond the  field-of-view. No significant change in the position of the bilateral  pleural chest tubes.    Post surgical changes of bilateral lung transplantation. Intact median  sternotomy wires. The trachea is midline. Heart size is within normal  limits. Improved left pleural effusion and associated lower lobe  opacities. Stable small right pleural effusion. No pneumothorax. No  new focal consolidative opacities.      Impression    IMPRESSION:   1. Improved left pleural effusion and associated lower lobe opacity.  2. Stable small right pleural effusion.  3. No new focal airspace opacities.  4. Stable support devices.     I have personally reviewed the examination and initial interpretation  and I agree with the findings.    JODI MENDEZ MD         SYSTEM ID:  [id number]   MR Brain for Stroke [facility] w/o & w Contras    Addendum: 10/27/2021    As per request this addendum is made. The question was if these  changes were due to PRES.     The scattered cortical areas of signal changes show expected evolution  of infarction on MRI. The restricted diffusion is still persistent  which can be seen up to 10 days in case of infarction and there is now  increased areas of enhancement of the presumed infarcted areas which  is also expected for aging infarction. The findings are less likely  due to PRES as the findings have not reversed and as the FLAIR signal  changes are all associated with restricted  diffusion which is a rare  finding to be seen in case of PRES.   Continued follow up with MRI is recommended to further visualize the  evolution of the findings.     YAN PRABHAKAR MD         SYSTEM ID:  [id number]      Narrative    MRI brain without and with contrast  MRA of the head without contrast  Neck MRA without and with contrast    Provided History:  follow up for stroke. concern for microhemorrhages.    Comparison:  MR brain 10/23/21    Technique:   Brain MRI:  Axial diffusion, FLAIR, T2-weighted, susceptibility, and  coronal T1-weighted images were obtained without intravenous contrast.  Following intravenous gadolinium-based contrast administration, axial  and coronal T1-weighted images were obtained.    Head MRA: 3D time-of-flight MRA of the [place] was performed  without intravenous contrast.  Neck MRA:  Limited non contrast 2DTOF images were obtained of the  mid-cervical region. Following intravenous gadolinium-based contrast  administration, a contrast enhanced MRA of the neck/cervical vessels  was performed.  Three-dimensional reconstructions of the neck and head MRA were  created, which were reviewed by the radiologist.    Dose: 7 mL gadavist    Findings:   Brain MRI: Evolving infarction within the left occipital lobe, right  occipital lobe, bilateral frontal and parietal lobes, and left  cerebellum without significant change compared to prior. These areas  continue to demonstrate restricted diffusion and increased T2 signal  on FLAIR. There continues to be T1 hyperintensity within the areas of  infarct, most prominent in the left parieto-occipital lobe. No new  evidence of restricted diffusion seen on today's examination. No  evidence of significant mass effect.    On the FLAIR images, there is nonspecific scattered periventricular T2  hyperintensities without associated diffusion restriction, which are  most likely related to chronic small vessel ischemic disease.     On susceptibility  images, the scattered punctate regions of  susceptibility effect corresponding to areas of restricted diffusion  are minimally increased compared to prior, most prominent in the  parieto-occipital lobes suggesting microhemorrhages.     Ventricles are proportional to cerebral sulci. No midline shift. No  abnormal extra axial fluid collection identified. Paranasal sinus  mucosal thickening is again seen. Bilateral mastoid effusions.     Head MRA demonstrates no definite aneurysm or stenosis of the major  intracranial arteries.     Neck MRA demonstrates patent major cervical arteries. Antegrade flow  in the major cervical vasculature.      Impression    Impression:  1. Evolving acute age multifocal acute infarctions in both cerebral  hemispheres and left cerebellum. No new areas of infarction when  compared with prior MRI brain on 10/23/2021.  2. Minimally increased punctate regions of susceptibility effect  within areas of infarction, corresponding to petechial hemorrhage  within previously identified multifocal acute infarctions.  3. Head MRA demonstrates no definite aneurysm or stenosis of the major  intracranial arteries.  4. Neck MRA demonstrates patent major cervical arteries.    I have personally reviewed the examination and initial interpretation  and I agree with the findings.    YAN PRABHAKAR MD         SYSTEM ID:  [id number]   XR Chest Port 1 View    Narrative    EXAM: XR CHEST PORT 1 VIEW  10/27/2021 1:31 AM     HISTORY:  s/p lung transplant       COMPARISON:  10/26/2021    TECHNIQUE: Portable AP radiograph of the chest.    FINDINGS:   Endotracheal tube tip projects over the mid to low thoracic trachea  approximately 2.4 cm above the shiv. Feeding tube and enteric tube  course below the diaphragm and beyond the field-of-view. Stable  mediastinal and bilateral pleural chest tubes. Intact median  sternotomy wires.    The trachea is midline. Stable heart size with slightly more distinct  margins. Slightly  improved perihilar and basilar opacities. No large  pleural effusion. No appreciable pneumothorax.      Impression    IMPRESSION:   1. Slightly improved perihilar and basilar opacities, likely  postoperative atelectasis.  2. Endotracheal tube is in borderline low position. Consider slight  retraction.  3. Additional support devices are stable.    I have personally reviewed the examination and initial interpretation  and I agree with the findings.    MAURICIO NAVAS MD         SYSTEM ID:  [id number]

## 2021-10-28 ENCOUNTER — APPOINTMENT (OUTPATIENT)
Dept: GENERAL RADIOLOGY | Facility: CLINIC | Age: 56
End: 2021-10-28
Attending: STUDENT IN AN ORGANIZED HEALTH CARE EDUCATION/TRAINING PROGRAM
Payer: COMMERCIAL

## 2021-10-28 ENCOUNTER — APPOINTMENT (OUTPATIENT)
Dept: PHYSICAL THERAPY | Facility: CLINIC | Age: 56
End: 2021-10-28
Attending: STUDENT IN AN ORGANIZED HEALTH CARE EDUCATION/TRAINING PROGRAM
Payer: COMMERCIAL

## 2021-10-28 ENCOUNTER — APPOINTMENT (OUTPATIENT)
Dept: NEUROLOGY | Facility: CLINIC | Age: 56
End: 2021-10-28
Attending: STUDENT IN AN ORGANIZED HEALTH CARE EDUCATION/TRAINING PROGRAM
Payer: COMMERCIAL

## 2021-10-28 ENCOUNTER — ANESTHESIA EVENT (OUTPATIENT)
Dept: SURGERY | Facility: CLINIC | Age: 56
End: 2021-10-28
Payer: COMMERCIAL

## 2021-10-28 LAB
ABO/RH(D): NORMAL
ALBUMIN SERPL-MCNC: 1.7 G/DL (ref 3.4–5)
ALP SERPL-CCNC: 130 U/L (ref 40–150)
ALT SERPL W P-5'-P-CCNC: 47 U/L (ref 0–70)
ANION GAP SERPL CALCULATED.3IONS-SCNC: 3 MMOL/L (ref 3–14)
ANTIBODY SCREEN: NEGATIVE
AST SERPL W P-5'-P-CCNC: 21 U/L (ref 0–45)
BACTERIA BLD CULT: ABNORMAL
BACTERIA BLD CULT: ABNORMAL
BACTERIA BLD CULT: NO GROWTH
BACTERIA BLD CULT: NO GROWTH
BACTERIA SPEC CULT: NORMAL
BASE EXCESS BLDA CALC-SCNC: 6.3 MMOL/L (ref -9–1.8)
BASOPHILS # BLD AUTO: 0.1 10E3/UL (ref 0–0.2)
BASOPHILS NFR BLD AUTO: 0 %
BILIRUB SERPL-MCNC: 0.7 MG/DL (ref 0.2–1.3)
BUN SERPL-MCNC: 60 MG/DL (ref 7–30)
CALCIUM SERPL-MCNC: 9 MG/DL (ref 8.5–10.1)
CHLORIDE BLD-SCNC: 110 MMOL/L (ref 94–109)
CO2 SERPL-SCNC: 30 MMOL/L (ref 20–32)
CREAT SERPL-MCNC: 1.04 MG/DL (ref 0.66–1.25)
EOSINOPHIL # BLD AUTO: 0 10E3/UL (ref 0–0.7)
EOSINOPHIL NFR BLD AUTO: 0 %
ERYTHROCYTE [DISTWIDTH] IN BLOOD BY AUTOMATED COUNT: 15.9 % (ref 10–15)
GFR SERPL CREATININE-BSD FRML MDRD: 80 ML/MIN/1.73M2
GLUCOSE BLD-MCNC: 169 MG/DL (ref 70–99)
GLUCOSE BLDC GLUCOMTR-MCNC: 100 MG/DL (ref 70–99)
GLUCOSE BLDC GLUCOMTR-MCNC: 133 MG/DL (ref 70–99)
GLUCOSE BLDC GLUCOMTR-MCNC: 139 MG/DL (ref 70–99)
GLUCOSE BLDC GLUCOMTR-MCNC: 141 MG/DL (ref 70–99)
GLUCOSE BLDC GLUCOMTR-MCNC: 143 MG/DL (ref 70–99)
GLUCOSE BLDC GLUCOMTR-MCNC: 145 MG/DL (ref 70–99)
GLUCOSE BLDC GLUCOMTR-MCNC: 147 MG/DL (ref 70–99)
GLUCOSE BLDC GLUCOMTR-MCNC: 148 MG/DL (ref 70–99)
GLUCOSE BLDC GLUCOMTR-MCNC: 148 MG/DL (ref 70–99)
GLUCOSE BLDC GLUCOMTR-MCNC: 150 MG/DL (ref 70–99)
GLUCOSE BLDC GLUCOMTR-MCNC: 153 MG/DL (ref 70–99)
GLUCOSE BLDC GLUCOMTR-MCNC: 153 MG/DL (ref 70–99)
GLUCOSE BLDC GLUCOMTR-MCNC: 159 MG/DL (ref 70–99)
GLUCOSE BLDC GLUCOMTR-MCNC: 160 MG/DL (ref 70–99)
GLUCOSE BLDC GLUCOMTR-MCNC: 162 MG/DL (ref 70–99)
GLUCOSE BLDC GLUCOMTR-MCNC: 164 MG/DL (ref 70–99)
GLUCOSE BLDC GLUCOMTR-MCNC: 171 MG/DL (ref 70–99)
GLUCOSE BLDC GLUCOMTR-MCNC: 173 MG/DL (ref 70–99)
GLUCOSE BLDC GLUCOMTR-MCNC: 175 MG/DL (ref 70–99)
GLUCOSE BLDC GLUCOMTR-MCNC: 177 MG/DL (ref 70–99)
GRAM STAIN RESULT: NORMAL
GRAM STAIN RESULT: NORMAL
HCO3 BLD-SCNC: 30 MMOL/L (ref 21–28)
HCT VFR BLD AUTO: 33.2 % (ref 40–53)
HGB BLD-MCNC: 10.3 G/DL (ref 13.3–17.7)
IMM GRANULOCYTES # BLD: 1.2 10E3/UL
IMM GRANULOCYTES NFR BLD: 3 %
INR PPP: 1.13 (ref 0.85–1.15)
LYMPHOCYTES # BLD AUTO: 1.6 10E3/UL (ref 0.8–5.3)
LYMPHOCYTES NFR BLD AUTO: 4 %
MAGNESIUM SERPL-MCNC: 1.8 MG/DL (ref 1.6–2.3)
MCH RBC QN AUTO: 30.5 PG (ref 26.5–33)
MCHC RBC AUTO-ENTMCNC: 31 G/DL (ref 31.5–36.5)
MCV RBC AUTO: 98 FL (ref 78–100)
MONOCYTES # BLD AUTO: 1.7 10E3/UL (ref 0–1.3)
MONOCYTES NFR BLD AUTO: 4 %
NEUTROPHILS # BLD AUTO: 36.5 10E3/UL (ref 1.6–8.3)
NEUTROPHILS NFR BLD AUTO: 89 %
NRBC # BLD AUTO: 0 10E3/UL
NRBC BLD AUTO-RTO: 0 /100
O2/TOTAL GAS SETTING VFR VENT: 50 %
PCO2 BLD: 37 MM HG (ref 35–45)
PH BLD: 7.51 [PH] (ref 7.35–7.45)
PHOSPHATE SERPL-MCNC: 4.2 MG/DL (ref 2.5–4.5)
PLATELET # BLD AUTO: 246 10E3/UL (ref 150–450)
PO2 BLD: 114 MM HG (ref 80–105)
POTASSIUM BLD-SCNC: 5 MMOL/L (ref 3.4–5.3)
PROT SERPL-MCNC: 5.9 G/DL (ref 6.8–8.8)
RBC # BLD AUTO: 3.38 10E6/UL (ref 4.4–5.9)
SODIUM SERPL-SCNC: 143 MMOL/L (ref 133–144)
SODIUM SERPL-SCNC: 147 MMOL/L (ref 133–144)
SPECIMEN EXPIRATION DATE: NORMAL
TACROLIMUS BLD-MCNC: 6.8 UG/L (ref 5–15)
TME LAST DOSE: NORMAL H
TME LAST DOSE: NORMAL H
UFH PPP CHRO-ACNC: 0.47 IU/ML
WBC # BLD AUTO: 41 10E3/UL (ref 4–11)

## 2021-10-28 PROCEDURE — 250N000009 HC RX 250: Performed by: STUDENT IN AN ORGANIZED HEALTH CARE EDUCATION/TRAINING PROGRAM

## 2021-10-28 PROCEDURE — 71045 X-RAY EXAM CHEST 1 VIEW: CPT | Mod: 26 | Performed by: RADIOLOGY

## 2021-10-28 PROCEDURE — 36415 COLL VENOUS BLD VENIPUNCTURE: CPT | Performed by: NURSE PRACTITIONER

## 2021-10-28 PROCEDURE — 85520 HEPARIN ASSAY: CPT | Performed by: THORACIC SURGERY (CARDIOTHORACIC VASCULAR SURGERY)

## 2021-10-28 PROCEDURE — 71045 X-RAY EXAM CHEST 1 VIEW: CPT

## 2021-10-28 PROCEDURE — 94681 O2 UPTK CO2 OUTP % O2 XTRC: CPT

## 2021-10-28 PROCEDURE — 84100 ASSAY OF PHOSPHORUS: CPT | Performed by: NURSE PRACTITIONER

## 2021-10-28 PROCEDURE — 250N000013 HC RX MED GY IP 250 OP 250 PS 637: Performed by: NURSE PRACTITIONER

## 2021-10-28 PROCEDURE — 250N000013 HC RX MED GY IP 250 OP 250 PS 637

## 2021-10-28 PROCEDURE — 80053 COMPREHEN METABOLIC PANEL: CPT | Performed by: NURSE PRACTITIONER

## 2021-10-28 PROCEDURE — 250N000011 HC RX IP 250 OP 636: Performed by: STUDENT IN AN ORGANIZED HEALTH CARE EDUCATION/TRAINING PROGRAM

## 2021-10-28 PROCEDURE — 94640 AIRWAY INHALATION TREATMENT: CPT | Mod: 76

## 2021-10-28 PROCEDURE — 94640 AIRWAY INHALATION TREATMENT: CPT

## 2021-10-28 PROCEDURE — 83735 ASSAY OF MAGNESIUM: CPT | Performed by: INTERNAL MEDICINE

## 2021-10-28 PROCEDURE — 250N000013 HC RX MED GY IP 250 OP 250 PS 637: Performed by: STUDENT IN AN ORGANIZED HEALTH CARE EDUCATION/TRAINING PROGRAM

## 2021-10-28 PROCEDURE — 82803 BLOOD GASES ANY COMBINATION: CPT

## 2021-10-28 PROCEDURE — 999N000157 HC STATISTIC RCP TIME EA 10 MIN

## 2021-10-28 PROCEDURE — 99232 SBSQ HOSP IP/OBS MODERATE 35: CPT | Mod: 25 | Performed by: PSYCHIATRY & NEUROLOGY

## 2021-10-28 PROCEDURE — 97530 THERAPEUTIC ACTIVITIES: CPT | Mod: GP

## 2021-10-28 PROCEDURE — 85610 PROTHROMBIN TIME: CPT | Performed by: NURSE PRACTITIONER

## 2021-10-28 PROCEDURE — 250N000012 HC RX MED GY IP 250 OP 636 PS 637: Performed by: NURSE PRACTITIONER

## 2021-10-28 PROCEDURE — 200N000002 HC R&B ICU UMMC

## 2021-10-28 PROCEDURE — 250N000013 HC RX MED GY IP 250 OP 250 PS 637: Performed by: ANESTHESIOLOGY

## 2021-10-28 PROCEDURE — 250N000011 HC RX IP 250 OP 636: Performed by: INTERNAL MEDICINE

## 2021-10-28 PROCEDURE — 71045 X-RAY EXAM CHEST 1 VIEW: CPT | Mod: 77

## 2021-10-28 PROCEDURE — 80197 ASSAY OF TACROLIMUS: CPT | Performed by: NURSE PRACTITIONER

## 2021-10-28 PROCEDURE — 86900 BLOOD TYPING SEROLOGIC ABO: CPT | Performed by: THORACIC SURGERY (CARDIOTHORACIC VASCULAR SURGERY)

## 2021-10-28 PROCEDURE — 94003 VENT MGMT INPAT SUBQ DAY: CPT

## 2021-10-28 PROCEDURE — 250N000009 HC RX 250: Performed by: NURSE PRACTITIONER

## 2021-10-28 PROCEDURE — 250N000009 HC RX 250: Performed by: PHYSICIAN ASSISTANT

## 2021-10-28 PROCEDURE — 94668 MNPJ CHEST WALL SBSQ: CPT

## 2021-10-28 PROCEDURE — 95711 VEEG 2-12 HR UNMONITORED: CPT

## 2021-10-28 PROCEDURE — 95718 EEG PHYS/QHP 2-12 HR W/VEEG: CPT | Mod: GC | Performed by: PSYCHIATRY & NEUROLOGY

## 2021-10-28 PROCEDURE — 99291 CRITICAL CARE FIRST HOUR: CPT | Mod: 24 | Performed by: STUDENT IN AN ORGANIZED HEALTH CARE EDUCATION/TRAINING PROGRAM

## 2021-10-28 PROCEDURE — 99233 SBSQ HOSP IP/OBS HIGH 50: CPT | Mod: 24 | Performed by: INTERNAL MEDICINE

## 2021-10-28 PROCEDURE — 85025 COMPLETE CBC W/AUTO DIFF WBC: CPT | Performed by: STUDENT IN AN ORGANIZED HEALTH CARE EDUCATION/TRAINING PROGRAM

## 2021-10-28 RX ORDER — METOLAZONE 5 MG/1
5 TABLET ORAL ONCE
Status: COMPLETED | OUTPATIENT
Start: 2021-10-28 | End: 2021-10-28

## 2021-10-28 RX ORDER — OXYMETAZOLINE HYDROCHLORIDE 0.05 G/100ML
2 SPRAY NASAL DAILY PRN
Status: DISCONTINUED | OUTPATIENT
Start: 2021-10-28 | End: 2021-10-28

## 2021-10-28 RX ORDER — OXYMETAZOLINE HYDROCHLORIDE 0.05 G/100ML
2 SPRAY NASAL EVERY 4 HOURS PRN
Status: DISCONTINUED | OUTPATIENT
Start: 2021-10-28 | End: 2021-12-13 | Stop reason: HOSPADM

## 2021-10-28 RX ORDER — AMIODARONE HYDROCHLORIDE 200 MG/1
200 TABLET ORAL DAILY
Status: DISCONTINUED | OUTPATIENT
Start: 2021-10-29 | End: 2021-11-19

## 2021-10-28 RX ORDER — AMINO AC/PROTEIN HYDR/WHEY PRO 10G-100/30
1 LIQUID (ML) ORAL 3 TIMES DAILY
Status: DISCONTINUED | OUTPATIENT
Start: 2021-10-28 | End: 2021-11-02

## 2021-10-28 RX ORDER — OFLOXACIN 3 MG/ML
5 SOLUTION AURICULAR (OTIC) 2 TIMES DAILY
Status: COMPLETED | OUTPATIENT
Start: 2021-10-28 | End: 2021-10-30

## 2021-10-28 RX ORDER — MEROPENEM 1 G/1
1 INJECTION, POWDER, FOR SOLUTION INTRAVENOUS EVERY 8 HOURS
Status: DISCONTINUED | OUTPATIENT
Start: 2021-10-28 | End: 2021-10-29

## 2021-10-28 RX ADMIN — HYDRALAZINE HYDROCHLORIDE 100 MG: 50 TABLET, FILM COATED ORAL at 19:45

## 2021-10-28 RX ADMIN — HUMAN INSULIN 7 UNITS/HR: 100 INJECTION, SOLUTION SUBCUTANEOUS at 00:37

## 2021-10-28 RX ADMIN — ACETAMINOPHEN 975 MG: 325 TABLET, FILM COATED ORAL at 10:03

## 2021-10-28 RX ADMIN — ROSUVASTATIN CALCIUM 10 MG: 10 TABLET, FILM COATED ORAL at 07:51

## 2021-10-28 RX ADMIN — BUMETANIDE 2 MG: 0.25 INJECTION, SOLUTION INTRAMUSCULAR; INTRAVENOUS at 17:55

## 2021-10-28 RX ADMIN — SULFAMETHOXAZOLE AND TRIMETHOPRIM 80 MG: 200; 40 SUSPENSION ORAL at 07:50

## 2021-10-28 RX ADMIN — HUMAN INSULIN 8 UNITS/HR: 100 INJECTION, SOLUTION SUBCUTANEOUS at 05:42

## 2021-10-28 RX ADMIN — ACETYLCYSTEINE 2 ML: 200 SOLUTION ORAL; RESPIRATORY (INHALATION) at 11:55

## 2021-10-28 RX ADMIN — BUMETANIDE 2 MG: 0.25 INJECTION, SOLUTION INTRAMUSCULAR; INTRAVENOUS at 11:38

## 2021-10-28 RX ADMIN — Medication 1 PACKET: at 19:47

## 2021-10-28 RX ADMIN — NYSTATIN 1000000 UNITS: 500000 SUSPENSION ORAL at 07:50

## 2021-10-28 RX ADMIN — MYCOPHENOLATE MOFETIL 1500 MG: 200 POWDER, FOR SUSPENSION ORAL at 20:16

## 2021-10-28 RX ADMIN — MEROPENEM 1 G: 1 INJECTION, POWDER, FOR SOLUTION INTRAVENOUS at 17:57

## 2021-10-28 RX ADMIN — HYDRALAZINE HYDROCHLORIDE 100 MG: 50 TABLET, FILM COATED ORAL at 02:01

## 2021-10-28 RX ADMIN — HUMAN INSULIN 9 UNITS/HR: 100 INJECTION, SOLUTION SUBCUTANEOUS at 17:01

## 2021-10-28 RX ADMIN — AMIODARONE HYDROCHLORIDE 400 MG: 200 TABLET ORAL at 07:51

## 2021-10-28 RX ADMIN — CALCIUM CARBONATE 600 MG (1,500 MG)-VITAMIN D3 400 UNIT TABLET 1 TABLET: at 07:51

## 2021-10-28 RX ADMIN — HYDRALAZINE HYDROCHLORIDE 100 MG: 50 TABLET, FILM COATED ORAL at 14:06

## 2021-10-28 RX ADMIN — NYSTATIN 1000000 UNITS: 500000 SUSPENSION ORAL at 15:41

## 2021-10-28 RX ADMIN — ACETAMINOPHEN 975 MG: 325 TABLET, FILM COATED ORAL at 02:01

## 2021-10-28 RX ADMIN — NYSTATIN 1000000 UNITS: 500000 SUSPENSION ORAL at 19:47

## 2021-10-28 RX ADMIN — ISOSORBIDE DINITRATE 40 MG: 10 TABLET ORAL at 07:50

## 2021-10-28 RX ADMIN — LEVALBUTEROL HYDROCHLORIDE 1.25 MG: 1.25 SOLUTION RESPIRATORY (INHALATION) at 20:55

## 2021-10-28 RX ADMIN — HUMAN INSULIN 7 UNITS/HR: 100 INJECTION, SOLUTION SUBCUTANEOUS at 11:41

## 2021-10-28 RX ADMIN — INSULIN GLARGINE 5 UNITS: 100 INJECTION, SOLUTION SUBCUTANEOUS at 11:37

## 2021-10-28 RX ADMIN — CALCIUM CARBONATE 600 MG (1,500 MG)-VITAMIN D3 400 UNIT TABLET 1 TABLET: at 17:57

## 2021-10-28 RX ADMIN — POTASSIUM CHLORIDE 20 MEQ: 40 SOLUTION ORAL at 07:48

## 2021-10-28 RX ADMIN — CARVEDILOL 12.5 MG: 6.25 TABLET, FILM COATED ORAL at 07:51

## 2021-10-28 RX ADMIN — Medication 1 PACKET: at 07:56

## 2021-10-28 RX ADMIN — HYDRALAZINE HYDROCHLORIDE 100 MG: 50 TABLET, FILM COATED ORAL at 07:51

## 2021-10-28 RX ADMIN — MULTIVIT AND MINERALS-FERROUS GLUCONATE 9 MG IRON/15 ML ORAL LIQUID 15 ML: at 07:48

## 2021-10-28 RX ADMIN — BUMETANIDE 2 MG: 0.25 INJECTION, SOLUTION INTRAMUSCULAR; INTRAVENOUS at 07:45

## 2021-10-28 RX ADMIN — ISOSORBIDE DINITRATE 40 MG: 10 TABLET ORAL at 11:38

## 2021-10-28 RX ADMIN — LEVALBUTEROL HYDROCHLORIDE 1.25 MG: 1.25 SOLUTION RESPIRATORY (INHALATION) at 08:34

## 2021-10-28 RX ADMIN — METOLAZONE 5 MG: 5 TABLET ORAL at 10:03

## 2021-10-28 RX ADMIN — ACYCLOVIR 400 MG: 200 SUSPENSION ORAL at 07:49

## 2021-10-28 RX ADMIN — FLUCONAZOLE 400 MG: 200 TABLET ORAL at 07:51

## 2021-10-28 RX ADMIN — OFLOXAXIN 5 DROP: 3 SOLUTION/ DROPS AURICULAR (OTIC) at 20:27

## 2021-10-28 RX ADMIN — ISOSORBIDE DINITRATE 40 MG: 10 TABLET ORAL at 17:57

## 2021-10-28 RX ADMIN — TACROLIMUS 2 MG: 5 CAPSULE ORAL at 19:01

## 2021-10-28 RX ADMIN — Medication 40 MG: at 07:50

## 2021-10-28 RX ADMIN — TACROLIMUS 1.5 MG: 5 CAPSULE ORAL at 07:49

## 2021-10-28 RX ADMIN — AMLODIPINE BESYLATE 10 MG: 10 TABLET ORAL at 07:51

## 2021-10-28 RX ADMIN — LEVALBUTEROL HYDROCHLORIDE 1.25 MG: 1.25 SOLUTION RESPIRATORY (INHALATION) at 17:02

## 2021-10-28 RX ADMIN — ACETYLCYSTEINE 2 ML: 200 SOLUTION ORAL; RESPIRATORY (INHALATION) at 20:55

## 2021-10-28 RX ADMIN — PREDNISONE 15 MG: 5 TABLET ORAL at 07:50

## 2021-10-28 RX ADMIN — MYCOPHENOLATE MOFETIL 1500 MG: 200 POWDER, FOR SUSPENSION ORAL at 07:50

## 2021-10-28 RX ADMIN — ACETYLCYSTEINE 2 ML: 200 SOLUTION ORAL; RESPIRATORY (INHALATION) at 08:34

## 2021-10-28 RX ADMIN — ACYCLOVIR 400 MG: 200 SUSPENSION ORAL at 20:16

## 2021-10-28 RX ADMIN — PREDNISONE 15 MG: 5 TABLET ORAL at 19:41

## 2021-10-28 RX ADMIN — Medication 40 MG: at 15:32

## 2021-10-28 RX ADMIN — CARVEDILOL 12.5 MG: 6.25 TABLET, FILM COATED ORAL at 19:45

## 2021-10-28 RX ADMIN — NYSTATIN 1000000 UNITS: 500000 SUSPENSION ORAL at 11:38

## 2021-10-28 RX ADMIN — LEVOTHYROXINE SODIUM 25 MCG: 0.03 TABLET ORAL at 07:51

## 2021-10-28 RX ADMIN — LEVALBUTEROL HYDROCHLORIDE 1.25 MG: 1.25 SOLUTION RESPIRATORY (INHALATION) at 11:55

## 2021-10-28 RX ADMIN — ACETYLCYSTEINE 2 ML: 200 SOLUTION ORAL; RESPIRATORY (INHALATION) at 17:02

## 2021-10-28 RX ADMIN — ACETAMINOPHEN 975 MG: 325 TABLET, FILM COATED ORAL at 17:57

## 2021-10-28 ASSESSMENT — ACTIVITIES OF DAILY LIVING (ADL)
ADLS_ACUITY_SCORE: 22

## 2021-10-28 ASSESSMENT — ENCOUNTER SYMPTOMS: FEVER: 1

## 2021-10-28 ASSESSMENT — MIFFLIN-ST. JEOR: SCORE: 1427

## 2021-10-28 NOTE — PROGRESS NOTES
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  Course c/b CVA, encephalopathy, acute respiratory failure, acute kidney injury, fungemia.    OVERNIGHT EVENTS:  NAEO  Blood from ear this AM     TODAY'S PROGRESS:   - metolazone 5mg today  - continue scheduled bumex  - LP per neuro   - trach/peg tomorrow  - add lantus 5u    PLAN:  Neuro/ pain/ sedation:  Acute postoperative pain  Anxiety and depression  Acute to subacute CVA, b/l cerebral hemispheres and L cerebellum, suspect embolic  Encephalopathy and diffuse weakness  Blood from ear this AM    - Pain: none  - Sedation: off  - PTA escitalopram on hold   - LP per neuro     Pulmonary care:   SALTY on home CPAP  Acute hypoxic respiratory failure s/p b/l lung transplant 10/16/21  - Ventilator Settings:CMV- 18/400/5/55  - Levalbuterol nebs and Mucomyst, chest PT  - Daily CXR  - PST BID  - Titrate supplemental oxygen to maintain saturation above 92%.  - Pulmonary hygiene: Incentive spirometer every 15-30 minutes when awake, flutter valve, C&DB  - diuresis as below   - thoracic consult for trach and peg-j    Cardiovascular:    HTN  Afib   PFO  TC 10/23 no vegetations  Cardiogenic shock - resolved, now hypertensive - now improved   - Monitor hemodynamic status.   - MAP <100, SBP <140  - Amlodipine 10 mg daily, hydralazine 100 mg oral q6, isordil 40 tid, coreg 12.5 BID   - prn labetalol  - Statin: rosuvastatin 10mg  - ASA  - Amiodarone to 200 mg daily   - low-intensity heparin gtt    GI care/ Nutrition:   Elevated LFTs- resolved   GI bleed - NG trauma per GI, resolved   - Diet: goal TF via NJ   - PPI BID  - Continue bowel regimen: miralax,  senna    Renal/ Fluid Balance/ Electrolytes:   Acute kidney injury - resolving  Hypernatremia, improved with FWF  BL creat appears to be ~ 0.7  - bumex 2 TID for goal net negative -500-1L, add 5 of metolazone now   - decrease free water flushes to 150 q4  - Strict I/O, daily weights  - Avoid/limit nephrotoxins as able  - Replete lytes PRN per protocol     Endocrine:    Stress induced hyperglycemia with steroid-induced component, on DM2  Hypothyroidism  RA  Preop A1c 6.6  - insulin gtt, add lantus 5u today   - Goal BG <180 for optimal healing  - continue home synthroid     ID/ Antibiotics:  Immunosuppression s/p transplant  Candidal infection of donor lungs, pleural fluid  Fungemia, source likely pulmonary   - continue fluconazole pending clinical course   - Nystatin, Acyclovir, bactrim   - Tacrolimus, prednisone  - LP today per Neuro   - Follow up cultures   - appreciate transplant ID  - appreciate Ophthalmology   - Continue to monitor fever curve, WBC and inflammatory markers as appropriate      Heme:     Acute blood loss anemia  Hypogammaglobulinemia s/p IVIG  Thrombocytopenia, HIT negative - resolved   No s/sx active bleeding  - CBC   - ID as above    MSK/ Skin:  Clamshell surgical incision  - Sternal precautions  - Postoperative incision management per protocol  - PT/OT/CR     Prophylaxis:    - Mechanical prophylaxis for DVT: SCDs  - Chemical DVT prophylaxis: Heparin gtt  - PPI for 30 days postoperatively     Lines/ tubes/ drains:  - ETT  - Watson 10/25   - Chest tubes- 2 pleural  - OG    - NJ      Disposition:  - CV ICU.      Patient seen, findings and plan discussed with CV ICU staff.     Altagracia Allan MD  Surgery Resident PGY-3  Pg [id number]    ====================================    SUBJECTIVE:   NAEO.    OBJECTIVE:   1. VITAL SIGNS:   Temp:  [99.1  F (37.3  C)-101.5  F (38.6  C)] 99.1  F (37.3  C)  Pulse:  [72-98] 73  Resp:  [23-36] 26  BP: ()/(52-98) 115/73  FiO2 (%):  [45 %-50 %] 45 %  SpO2:  [93 %-98 %] 98  %  Ventilation Mode: CMV/AC  (Continuous Mandatory Ventilation/ Assist Control)  FiO2 (%): 45 %  Rate Set (breaths/minute): 18 breaths/min  Tidal Volume Set (mL): 400 mL  PEEP (cm H2O): 5 cmH2O  Pressure Support (cm H2O): 7 cmH2O  Oxygen Concentration (%): 50 %  Resp: 26    2. INTAKE/ OUTPUT:   I/O last 3 completed shifts:  In: 3831.61 [I.V.:691.61; NG/GT:1700]  Out: 3255 [Urine:2300; Emesis/NG output:700; Stool:225; Chest Tube:30]    3. PHYSICAL EXAMINATION:   General: critically ill  HEENT: NJ, OG (green-brown)   Neuro: opens eyes to name, not following commands, no motor observed   Resp: intubated, overbreathing vent at 26  CV: RRR on tele   Abdomen: nondistended, soft   Incisions: c/d/i  Extremities: warm and well perfused, no edema  CTs serosanguinous     4. INVESTIGATIONS:   Arterial Blood Gases   Recent Labs   Lab 10/28/21  0502 10/27/21  0335 10/26/21  0522 10/25/21  1136   PH 7.51* 7.50* 7.56* 7.51*   PCO2 37 36 30* 34*   PO2 114* 130* 92 87   HCO3 30* 28 27 27     Complete Blood Count   Recent Labs   Lab 10/28/21  0530 10/27/21  0523 10/26/21  1020 10/25/21  0326   WBC 41.0* 37.3* 35.8* 32.8*   HGB 10.3* 10.8* 9.9* 10.6*    273 341 407     Basic Metabolic Panel  Recent Labs   Lab 10/28/21  0530 10/28/21  0524 10/28/21  0402 10/28/21  0311 10/27/21  2220 10/27/21  2130 10/27/21  1806 10/27/21  1635 10/27/21  0621 10/27/21  0523 10/26/21  1717 10/26/21  1452 10/26/21  0931 10/26/21  0637     --   --   --   --  147*  --  148*  --  152*   < > 151*   < > 148*   POTASSIUM 5.0  --   --   --   --   --   --  5.2  --  4.4  --  5.0   < > 4.1   CHLORIDE 110*  --   --   --   --   --   --   --   --  120*  --  121*  --  117*   CO2 30  --   --   --   --   --   --   --   --  27  --  25  --  25   BUN 60*  --   --   --   --   --   --   --   --  60*  --  63*  --  62*   CR 1.04  --   --   --   --   --   --   --   --  0.90  --  1.16  --  1.11   * 164* 164* 171*   < >  --    < >  --    < > 208*   < > 244*   <  > 206*    < > = values in this interval not displayed.     Liver Function Tests  Recent Labs   Lab 10/28/21  0530 10/27/21  0523 10/26/21  0637 10/25/21  2113 10/25/21  1609 10/25/21  1609   AST 21 27 21 21   < > 20   ALT 47 71* 71* 84*   < > 88*   ALKPHOS 130 160* 160* 157*   < > 163*   BILITOTAL 0.7 0.3 0.3 0.3   < > 0.5   ALBUMIN 1.7* 1.8* 1.8* 1.6*   < > 1.6*   INR 1.13 1.16* 1.26*  --   --  1.27*    < > = values in this interval not displayed.     Pancreatic Enzymes  No lab results found in last 7 days.  Coagulation Profile  Recent Labs   Lab 10/28/21  0530 10/27/21  0523 10/26/21  0637 10/25/21  1609 10/23/21  0344 10/22/21  1407   INR 1.13 1.16* 1.26* 1.27*   < > 1.28*   PTT  --   --   --   --   --  43*    < > = values in this interval not displayed.         5. RADIOLOGY:   Recent Results (from the past 24 hour(s))   XR Chest Port 1 View    Narrative    EXAMINATION: XR CHEST PORT 1 VIEW, 10/27/2021 5:00 PM    INDICATION: Chest tube removal    COMPARISON: Chest x-ray 10/27/2021 at 12:55 AM.    FINDINGS: Single portable 40 degree upright AP radiograph of the  chest. ET tube projects at the mid thoracic trachea. 2 enteric tubes  course below the left hemidiaphragm, tips are now within view.  Interval removal of mediastinal drain. Stable positioning of bilateral  basilar chest tubes.    Postsurgical changes of bilateral lung transplant. Trachea is midline.  The cardiomediastinal silhouette is stably enlarged. Asymmetric  elevation of the right hemidiaphragm. No appreciable pneumothorax.  Small left pleural effusion. Bibasilar atelectasis. Unchanged  interstitial opacities.    Partially visualized upper abdomen is unremarkable. No acute osseous  abnormality. Soft tissue is within normal limits.         Impression    IMPRESSION:  1. Interval removal of pericardial drain. No appreciable pneumothorax.  Remainder of support devices are stable.  2. Postsurgical changes of bilateral lung transplant. Small left  pleural  effusion. Bibasilar atelectasis. Decreased interstitial  opacities.    I have personally reviewed the examination and initial interpretation  and I agree with the findings.    ARIES DELGADO MD         SYSTEM ID:  [id number]   XR Chest Port 1 View    Impression    RESIDENT PRELIMINARY INTERPRETATION  IMPRESSION: In this patient status post bilateral lung transplant:  1. No significant interval change.  2. Stable small left pleural effusion and postoperative atelectasis.  3. Mild pulmonary vascular congestion.          =========================================  Critical Care Services Progress Note:     Edson Thornton remains critically ill with mental status changes     I personally examined and evaluated the patient today.   The patient s prognosis today is tentative  I have evaluated all laboratory values and imaging studies from the past 24 hours.  Key findings and decisions made today included New onset left ear blood was found when evaluating patient there was no clear etiology on visual exam will reach out to ENT to rule out bleeding from inner ear while unlikely given his current state it does not seem unreasonable. Continued diuresis, given the drop in sodium we will decrease his free water flushes    I personally managed the ventilator, sedation, pain control and analgesia and antibiotic therapy.   Consults ongoing and ordered are ENT-Otolaryngology  Procedures that will happen today are: none  All treatments were placed at my direction.  I formulated today s plan with the house staff team or resident(s) and agree with the findings and plan in the associated note.       The above plans and care have been discussed with no one and all questions and concerns were addressed.     I spent a total of 37 minutes (excluding procedure time) personally providing and directing critical care services at the bedside and on the critical care unit for Edson Thornton.        Ronnie Ron MD

## 2021-10-28 NOTE — PROGRESS NOTES
[facility]    Stroke Progress Note    Interval Events- No acute overnight events  - Disconnected EEG, no epileptiform activity - persistent encephalopathy.    HPI Summary  Edson Thornton is a 56 year old male with with Afib, HTN, RA, SALTY, and ILD s/p lung transplant 10/16/21.  Per primary team he has had significant difficulty with ventilation required paralytics and sedation, however, he has typically moved all his extremities with full strength.  It was noted on 10/21 that he was no longer moving his lower extremities.  Stroke code activated on morning of 10/22 for persistent neurologic change.  CT and Brain MRI showed subacute bilateral cortical infarcts and left cerebellar infarct.  Unchanged neurologic exam while following.     Stroke Evaluation Summarized    MRI/Head CT MRI Brain (10/26/2021):  Impression:  1. Evolving acute age multifocal acute infarctions in both cerebral  hemispheres and left cerebellum. No new areas of infarction when  compared with prior MRI brain on 10/23/2021.  2. Minimally increased punctate regions of susceptibility effect  within areas of infarction, corresponding to petechial hemorrhage  within previously identified multifocal acute infarctions.  3. Head MRA demonstrates no definite aneurysm or stenosis of the major  intracranial arteries.  4. Neck MRA demonstrates patent major cervical arteries.    MRI Brain (10/23/2021):  Impression:  Multifocal subacute infarcts within both cerebral hemispheres and left cerebellum all of which appear present on prior head CT 10/22/2021.  Minimal petechial hemorrhage associated with the infarct in the left occipital lobe.     Head CT (10/22/2021) : multiple scattered subacute ischemic strokes, predating exam change from 10/22.  May contribute to encephalopathy, but would not explain lack of extremity movement.     Head CT (10/25/2021): This exam is significantly limited by motion and streak  artifact. L posterior parietal lobe acute/subacute infarct is identified on current exam w/ remaining previously seen infarct possibly obscured by artifact. No CT evidence for new cortical hypoattentuation to indicate a new acute infarct. No definite acute intracranial hemorrhage or midline shift     Intracranial Vasculature   Head CTA (10/25/2021): No intracranial arterial large vessel occlusion or significant stenosis/occlusion    Head CTA (10/22/2021): demonstrates no aneurysm or stenosis of major intracranial arteries.    CT Perfusion (10/22/2021): focally decreased cerebral blood volume and cerebral blood flow in the posterior most left parieto-occipital region with corresponding slight increase in TTP possibly representing an area of hypoperfusion or core infarct.      Cervical Vasculature   CTA Neck (10/22/2021): Mild stenosis in the R vertebral artery w/ severe high-grade stenosis at the L vertebral artery origin    CTA Neck (10/22/2021): No stenosis of major cervical arteries     Echocardiogram TC reported no valvular vegetations and no pulmonary vein thrombosis per primary team.     EKG/Telemetry   Sinus tachycardia   Otherwise normal ECG   When compared with ECG of 21-OCT-2021 06:50,   ST no longer depressed in Anterior leads   ST no longer elevated in Lateral leads      Other Testing   EEG showed no epileptiform discharges x2.  Positive for diffuse encephalopathy x2.      LDL  No lab value available in past 30 days   A1C  No lab value available in past 30 days   Troponin No lab value available in past 48 hrs       Impression     # Acute to subacute ischemic stroke of bilateral cerebral hemispheres and left cerebellum with suspected cardioembolic etiology  # Encephalopathy with Lack of Reactivity on Physical Exam    Pt's initial stroke code was called on 10/22 for change in physical exam. Per nursing report, the pt had previously been moving all extremities to the point of requiring restraints. However,  pt suddenly stopped moving altogether on 10/21 ~7PM. No Stroke Code was called until the next morning. CTH revealed multiple likely subacute ischemic infarcts in the bilateral cerebral hemispheres and L cerebellar hemisphere while CTA Head/Neck was unrevealing. Also performed a CT Perfusion (10/22) which noted similar findings. At the time, sustained clonus was noted on exam, raising concerns for cervical myelopathy. However, this improved on subsequent exams and CT C-spine was unremarkable. The remainder of the stroke work-up was completed w/ MRI Brain again showing similar finding to CT images. At this time, raised concern that pt's noted strokes did not correspond well to his current deficits and that these were unlikely to be the cause of his change in physical exam. Obtained an EEG which only revealed diffuse encephalopathy. Pt was noted to have multiple metabolic/toxic derangements and so these were seen as the likely culprits. Has since been found to have an BRENNAN and Candidemia.    A second stroke code was called on 10/25 again for decreased reactivity on exam. Repeat CTH and CTA Head/Neck during the code were unrevealing. Exam appears mildly improved today but still seems too dense to be solely metabolic abnormalities and ongoing hypoxic/hypercapneic respiratory failure. Infection is highly suspect as he has begun to spike recurrent fevers. Per nursing report, his fevers broke on the evening of 10/27 and the pt has been afebrile sine. These fevers persisted despite empiric antibiotics, antivirals, and he is now on antifungals. Primary team in the process of determining source of infection. Also of concern is increased stroke burden not seen on CT imaging or potential for a broader cortical damage caused by something such as anoxic brain injury or the potential like PRES. Repeat MRI with addendum stated that original known infarcts are progressing as expected; the findings on repeat MRI are less likely to be  associated with PRES.      Recommendations:  - LP to investigate for further source of infection. To be attempted today.  - O.k. to discontinue heparin gtt for impending trach/PEG procedure.     Thank you for including neurology in the care of this patient.  Stroke neurology will follow peripherally. Please call with additional questions or concerns relating to stroke.     This patient was staffed with the attending stroke physician, Dr. Albarran.      Vincent Knox,   Neurology Resident, PGY-1  Ph: *[phone]  ______________________________________________________    Clinically Significant Risk Factors Present on Admission                  Medications   Scheduled Meds    acetaminophen  975 mg Oral or Feeding Tube Q8H MARKY     acetylcysteine  2 mL Nebulization 4x Daily     acyclovir  400 mg Oral or NG Tube BID     [START ON 10/29/2021] amiodarone  200 mg Oral or Feeding Tube Daily     amLODIPine  10 mg Oral or Feeding Tube Daily     bumetanide  2 mg Intravenous TID     calcium carbonate 600 mg-vitamin D 400 units  1 tablet Oral or Feeding Tube BID w/meals     carvedilol  12.5 mg Oral or Feeding Tube BID     fluconazole  400 mg Oral Daily     hydrALAZINE  100 mg Oral or Feeding Tube Q6H     insulin glargine  5 Units Subcutaneous QAM AC     isosorbide dinitrate  40 mg Oral or Feeding Tube TID     levalbuterol  1.25 mg Nebulization 4x Daily     levothyroxine  25 mcg Oral Daily     multivitamins w/minerals  15 mL Per Feeding Tube Daily     mycophenolate  1,500 mg Oral or NG Tube BID     nystatin  1,000,000 Units Swish & Swallow 4x Daily     pantoprazole  40 mg Per Feeding Tube BID AC     polyethylene glycol  17 g Oral or Feeding Tube Daily     potassium chloride  20 mEq Oral Daily     [START ON 10/31/2021] predniSONE  15 mg Oral QAM    And     [START ON 10/31/2021] predniSONE  12.5 mg Oral QPM     predniSONE  15 mg Per Feeding Tube BID     protein modular  1 packet Per Feeding Tube BID     rosuvastatin  10 mg Oral or  Feeding Tube Daily     senna-docusate  1 tablet Oral or Feeding Tube BID     sodium chloride (PF)  3 mL Intracatheter Q8H     sulfamethoxazole-trimethoprim  10 mL Oral or NG Tube Daily    Or     sulfamethoxazole-trimethoprim  1 tablet Oral or NG Tube Daily     tacrolimus  1.5 mg Per OG Tube BID IS       Infusion Meds    dextrose       dextrose Stopped (10/24/21 1008)     heparin 1,000 Units/hr (10/28/21 0800)     insulin regular 7 Units/hr (10/28/21 1141)     BETA BLOCKER NOT PRESCRIBED         PRN Meds  bisacodyl, dextrose, dextrose, glucose **OR** dextrose **OR** glucagon, diphenhydrAMINE **OR** diphenhydrAMINE, labetalol, lidocaine 4%, lidocaine, lidocaine (buffered or not buffered), magnesium hydroxide, naloxone **OR** naloxone **OR** naloxone **OR** naloxone, ondansetron **OR** ondansetron, prochlorperazine **OR** prochlorperazine, BETA BLOCKER NOT PRESCRIBED, sodium chloride (PF)       PHYSICAL EXAMINATION  Temp:  [98.8  F (37.1  C)-101.5  F (38.6  C)] 99.9  F (37.7  C)  Pulse:  [72-98] 74  Resp:  [21-36] 25  BP: ()/(52-98) 105/58  FiO2 (%):  [45 %-50 %] 45 %  SpO2:  [93 %-98 %] 93 %      Mental Status:  Intubated, does not follow commands, does not open eyes to noxious stim, but does resist eye opening/shining light into eyes  Cranial Nerves:  PERRL, +corneal reflex bilaterall, +cough and gag reflex. Face appears grossly symmetric, does occasionally resist eye opening/shining light into his eyes bilaterally  Motor: No abnormal movements. Fails to withdraw to noxious stim or produce any other movements  Reflexes:  Reflexes brisk globally. Mute toes. No clonus   Sensory: Fails to withdraw to noxious stimuli in all extremitities  Coordination:  Unable to assess  Station/Gait:  Deferred    Stroke Scales    NIHSS  Interval     Interval Comments stroke code (10/25/21 5406)   1a. Level of Consciousness 3-->Responds only with reflex motor or autonomic effects or totally unresponsive, flaccid, and areflexic    1b. LOC Questions 2-->Answers neither question correctly   1c. LOC Commands 2-->Performs neither task correctly   2.   Best Gaze 2-->Forced deviation, or total gaze paresis not overcome by the oculocephalic maneuver   3.   Visual 0-->No visual loss   4.   Facial Palsy 3-->Complete paralysis of one or both sides (absence of facial movement in the upper and lower face)   5a. Motor Arm, Left 4-->No movement   5b. Motor Arm, Right 4-->No movement   6a. Motor Leg, Left 4-->No movement   6b. Motor Leg, right 4-->No movement   7.   Limb Ataxia 0-->Absent   8.   Sensory 2-->Severe to total sensory loss, patient is not aware of being touched in the face, arm, and leg   9.   Best Language 3-->Mute, global aphasia, no usable speech or auditory comprehension   10. Dysarthria (UN) Intubated or other physical barrier   11. Extinction and Inattention  0-->No abnormality   Total 33 (10/25/21 2124)       Imaging  I personally reviewed all imaging; relevant findings per HPI.     Lab Results Data   CBC  Recent Labs   Lab 10/28/21  0530 10/27/21  0523 10/26/21  1020   WBC 41.0* 37.3* 35.8*   RBC 3.38* 3.58* 3.28*   HGB 10.3* 10.8* 9.9*   HCT 33.2* 35.3* 32.0*    273 341     Basic Metabolic Panel    Recent Labs   Lab 10/28/21  0530 10/28/21  0524 10/28/21  0402 10/27/21  2220 10/27/21  2130 10/27/21  1806 10/27/21  1635 10/27/21  0621 10/27/21  0523 10/26/21  1717 10/26/21  1452     --   --   --  147*  --  148*   < > 152*   < > 151*   POTASSIUM 5.0  --   --   --   --   --  5.2  --  4.4   < > 5.0   CHLORIDE 110*  --   --   --   --   --   --   --  120*  --  121*   CO2 30  --   --   --   --   --   --   --  27  --  25   BUN 60*  --   --   --   --   --   --   --  60*  --  63*   CR 1.04  --   --   --   --   --   --   --  0.90  --  1.16   * 164* 164*   < >  --    < >  --    < > 208*   < > 244*   ERIK 9.0  --   --   --   --   --   --   --  9.1  --  8.6    < > = values in this interval not displayed.     Liver Panel  Recent  Labs   Lab 10/28/21  0530 10/27/21  0523 10/26/21  0637   PROTTOTAL 5.9* 6.2* 5.8*   ALBUMIN 1.7* 1.8* 1.8*   BILITOTAL 0.7 0.3 0.3   ALKPHOS 130 160* 160*   AST 21 27 21   ALT 47 71* 71*     INR    Recent Labs   Lab Test 10/28/21  0530 10/27/21  0523 10/26/21  0637   INR 1.13 1.16* 1.26*      Lipid Profile    Recent Labs   Lab Test 10/22/21  0407 10/21/21  0354 10/20/21  0308 10/19/21  0338 09/07/21  1324   CHOL  --   --   --   --  214*   HDL  --   --   --   --  51   LDL  --   --   --   --  130*   TRIG 307* 486* 383*   < > 364*    < > = values in this interval not displayed.     A1C    Recent Labs   Lab Test 09/07/21  0928 09/05/21  1901   A1C 6.6* 6.5*     Troponin I    Recent Labs   Lab 10/22/21  0958   TROPONIN 0.807*

## 2021-10-28 NOTE — PLAN OF CARE
Patient continues to be ventilated and off sedation. Continues to be unresponsive to physical stimuli with RASS -5 and CPOT-0. Plan for trach and peg tomorrow. Tube feedings at goal and tolerating with no problems. Right bleeding ear packed at bedside by ENT with no problems. Will continue to monitor and refer to flowsheets for documentation.

## 2021-10-28 NOTE — CONSULTS
Otolaryngology Consult Note  October 28, 2021      CC: bleeding from right ear    HPI: Edson Thornton is a 56 year old male with a past medical history of NSIP/ILD s/p BSLT on 10/16/21 and subsequent CVA noted on 10/22/21. Hospital course further complicated by afib w/ RVR, BRENNAN, GI bleed 2/2 NJ/OG trauma, candidemia and fevers with ongoing tenuous neuro status since stroke. ENT was consulted today for bleeding from the right ear that was noted this morning. Per primary team, bleeding started spontaneously and has been pooling in the ear. No known trauma or instrumentation to the ear. He is on a low intensity heparin drip for his recent stroke. Patient is intubated and has no motor function of extremities since stroke.     Past Medical History:   Diagnosis Date     BRENNAN (acute kidney injury) (H) 10/17/2021     Anxiety      Depression      HLD (hyperlipidemia)      HTN (hypertension)      Hypothyroidism      ILD (interstitial lung disease) (H)      SALTY on CPAP      Oxygen dependent     BL 4L since ~6/2021     Rheumatoid arthritis (H)     signs ~5/2020, dx 5/2021     S/P lung transplant (H) 10/16/2021     Shock liver 10/17/2021     Steroid-induced hyperglycemia      Traction bronchiectasis (H)        Past Surgical History:   Procedure Laterality Date     COLONOSCOPY W/ BIOPSIES AND POLYPECTOMY  07/21/2020     CV CORONARY ANGIOGRAM N/A 9/8/2021    Procedure: Coronary Angiogram with possible intervention;  Surgeon: Jovon Bullock MD;  Location:  [facility]     CV RIGHT HEART CATH MEASUREMENTS RECORDED N/A 9/8/2021    Procedure: Right Heart Cath;  Surgeon: Jovon Bullock MD;  Location:  [facility]     ESOPHAGOSCOPY, GASTROSCOPY, DUODENOSCOPY (EGD), COMBINED N/A 10/23/2021    Procedure: ESOPHAGOGASTRODUODENOSCOPY (EGD);  Surgeon: Miquel Pisano MD;  Location:  GI     EXTRACTION(S) DENTAL N/A 9/22/2021    Procedure: EXTRACTION tooth #19;  Surgeon: Deepak Tobin  DDS;  Location: [facility] OR     HERNIA REPAIR       right acl       TRANSPLANT LUNG RECIPIENT SINGLE X2 Bilateral 10/16/2021    Procedure: TRANSPLANT, LUNG, RECIPIENT, BILATERAL, Bronchoscopy, on-pump perfusion, bilateral clamshell sternotomy;  Surgeon: Yanick Corral MD;  Location: [facility] OR     XR ACROMIOCLAVICULAR JOINT BILATERAL         No current outpatient medications on file.        No Known Allergies    Social History     Socioeconomic History     Marital status: Single     Spouse name: Not on file     Number of children: Not on file     Years of education: Not on file     Highest education level: Not on file   Occupational History     Not on file   Tobacco Use     Smoking status: Never Smoker     Smokeless tobacco: Never Used   Substance and Sexual Activity     Alcohol use: Never     Drug use: Never     Sexual activity: Not on file   Other Topics Concern     Parent/sibling w/ CABG, MI or angioplasty before 65F 55M? Not Asked   Social History Narrative     Not on file     Social Determinants of Health     Financial Resource Strain:      Difficulty of Paying Living Expenses:    Food Insecurity:      Worried About Running Out of Food in the Last Year:      Ran Out of Food in the Last Year:    Transportation Needs:      Lack of Transportation (Medical):      Lack of Transportation (Non-Medical):    Physical Activity:      Days of Exercise per Week:      Minutes of Exercise per Session:    Stress:      Feeling of Stress :    Social Connections:      Frequency of Communication with Friends and Family:      Frequency of Social Gatherings with Friends and Family:      Attends Christianity Services:      Active Member of Clubs or Organizations:      Attends Club or Organization Meetings:      Marital Status:    Intimate Partner Violence:      Fear of Current or Ex-Partner:      Emotionally Abused:      Physically Abused:      Sexually Abused:        Family History   Problem Relation Age of Onset     Diabetes Type 1  "Mother      Heart Disease Mother      Chronic Obstructive Pulmonary Disease Mother      Rheumatoid Arthritis Father      Emphysema Paternal Grandfather        ROS: 12 point review of systems is negative unless noted in HPI.    PHYSICAL EXAM:  General: laying in bed, unresponsive, turns head to pain   /62   Pulse 83   Temp 100  F (37.8  C) (Bladder)   Resp 26   Ht 1.626 m (5' 4\")   Wt 68.6 kg (151 lb 3.8 oz)   SpO2 94%   BMI 25.96 kg/m    HEAD: normocephalic, atraumatic  Face: no swelling, edema, or erythema.   Eyes: clear sclera  Ears: Right ear with pooled blood in the conchal bowl. Blood suctioned away with 7Fr suction to reveal a very superficial excoriation of the lateral canal floor that was slowly oozing. Remainder of canal clear, TM intact with mucoid effusion present, no erythema. Left EAC open and clear, TM intact with mucoid effusion and small area of hemotympanum along the inferolateral TM. No erythema/swelling/fluctuance over the mastoids b/l.   Nose: no anterior drainage or bleeding, intact and midline septum without perforation or hematoma. Old blood present in the left nasal passage along the NG tube which is bridled in place.   Mouth: orotracheally intubated, mucosa moist, no ulcers, tongue midline and symmetric  Oropharynx: old blood tinged secretions present in the posterior oropharynx, no bright red blood  Neck: soft and flat, trachea midline  Respiratory: mechanically ventilated  Skin: no rashes or skin lesions of the face/neck    ROUTINE IP LABS (Last four results)  BMP  Recent Labs   Lab 10/28/21  0530 10/28/21  0524 10/28/21  0402 10/28/21  0311 10/27/21  2220 10/27/21  2130 10/27/21  1806 10/27/21  1635 10/27/21  0621 10/27/21  0523 10/26/21  1717 10/26/21  1452 10/26/21  0931 10/26/21  0637     --   --   --   --  147*  --  148*  --  152*   < > 151*   < > 148*   POTASSIUM 5.0  --   --   --   --   --   --  5.2  --  4.4  --  5.0   < > 4.1   CHLORIDE 110*  --   --   --   --   " --   --   --   --  120*  --  121*  --  117*   ERIK 9.0  --   --   --   --   --   --   --   --  9.1  --  8.6  --  8.9   CO2 30  --   --   --   --   --   --   --   --  27  --  25  --  25   BUN 60*  --   --   --   --   --   --   --   --  60*  --  63*  --  62*   CR 1.04  --   --   --   --   --   --   --   --  0.90  --  1.16  --  1.11   * 164* 164* 171*   < >  --    < >  --    < > 208*   < > 244*   < > 206*    < > = values in this interval not displayed.     CBC  Recent Labs   Lab 10/28/21  0530 10/27/21  0523 10/26/21  1020 10/25/21  0326   WBC 41.0* 37.3* 35.8* 32.8*   RBC 3.38* 3.58* 3.28* 3.53*   HGB 10.3* 10.8* 9.9* 10.6*   HCT 33.2* 35.3* 32.0* 33.1*   MCV 98 99 98 94   MCH 30.5 30.2 30.2 30.0   MCHC 31.0* 30.6* 30.9* 32.0   RDW 15.9* 16.0* 16.2* 15.9*    273 341 407     INR  Recent Labs   Lab 10/28/21  0530 10/27/21  0523 10/26/21  0637 10/25/21  1609   INR 1.13 1.16* 1.26* 1.27*       Assessment and Plan  Edson Thornton is a 56 year old male with a past medical history of NSIP/ILD s/p BSLT on 10/16/21 and subsequent CVA on 10/22/21. Hospital course further complicated by afib w/ RVR, BRENNAN, GI bleed 2/2 NJ/OG trauma, candidemia and fevers with ongoing tenuous neuro status since stroke. ENT was consulted today for bleeding from the right ear that was noted this morning. Small superficial excoriation of the lateral canal floor noted on exam. Otowicks x 3 placed in the canal and saturated with Afrin spray and hemostasis achieved.     - Recommend leaving otowicks in place x 2-3 days to allow for resolution of the bleeding. ENT will return for wick removal.   - Floxin otic drops BID to right ear (place directly into packing) x 5 days as prophylaxis for infection  - If bleeding is noted from the right ear, apply Afrin to otowick packing PRN   - Remainder of care per primary team    -- Patient and above plan to be discussed with Dr. Juarez.     Fallon Russ PA-C  Otolaryngology-Head & Neck Surgery  Please  page ENT with questions by dialing [phone] and entering job code [phone] when prompted.

## 2021-10-28 NOTE — CONSULTS
THORACIC SURGERY CONSULT NOTE    Consult Reason: Trach PEG    HPI: History unobtainable from patient, history from EMR.     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 Corral. We were consulted for a trach peg for nutrition    - Will schedule for trach peg later this week.    Patient and plan discussed with attending.    Thank you for the opportunity to participate in the care of this patient.    PMH:  Past Medical History:   Diagnosis Date     BRENNAN (acute kidney injury) (H) 10/17/2021     Anxiety      Depression      HLD (hyperlipidemia)      HTN (hypertension)      Hypothyroidism      ILD (interstitial lung disease) (H)      SALTY on CPAP      Oxygen dependent     BL 4L since ~6/2021     Rheumatoid arthritis (H)     signs ~5/2020, dx 5/2021     S/P lung transplant (H) 10/16/2021     Shock liver 10/17/2021     Steroid-induced hyperglycemia      Traction bronchiectasis (H)          PSH:  Past Surgical History:   Procedure Laterality Date     COLONOSCOPY W/ BIOPSIES AND POLYPECTOMY  07/21/2020     CV CORONARY ANGIOGRAM N/A 9/8/2021    Procedure: Coronary Angiogram with possible intervention;  Surgeon: Jovon Bullock MD;  Location:  [facility]     CV RIGHT HEART CATH MEASUREMENTS RECORDED N/A 9/8/2021    Procedure: Right Heart Cath;  Surgeon: Jovon Bullock MD;  Location:  [facility]     ESOPHAGOSCOPY, GASTROSCOPY, DUODENOSCOPY (EGD), COMBINED N/A 10/23/2021    Procedure: ESOPHAGOGASTRODUODENOSCOPY (EGD);  Surgeon: Miquel Pisano MD;  Location:  GI     EXTRACTION(S) DENTAL N/A 9/22/2021    Procedure: EXTRACTION tooth #19;  Surgeon: Deepak Tobin DDS;  Location: [facility] OR     HERNIA REPAIR       right acl       TRANSPLANT LUNG RECIPIENT SINGLE X2 Bilateral 10/16/2021    Procedure: TRANSPLANT, LUNG,  RECIPIENT, BILATERAL, Bronchoscopy, on-pump perfusion, bilateral clamshell sternotomy;  Surgeon: Yanick Corral MD;  Location: [facility] OR     XR ACROMIOCLAVICULAR JOINT BILATERAL           FH:  family history includes Chronic Obstructive Pulmonary Disease in his mother; Diabetes Type 1 in his mother; Emphysema in his paternal grandfather; Heart Disease in his mother; Rheumatoid Arthritis in his father.      SH:  No Tobacco use  No EtOH use      Allergies:  No Known Allergies    Home Meds:  Medications Prior to Admission   Medication Sig Dispense Refill Last Dose     acetaminophen (TYLENOL) 325 MG tablet Take 325 mg by mouth every 6 hours as needed for mild pain   8/29/2021     amLODIPine (NORVASC) 5 MG tablet Take 5 mg by mouth daily   9/5/2021 at 0813     escitalopram (LEXAPRO) 5 MG tablet Take 5 mg by mouth daily   9/4/2021 at 2006     fluticasone-vilanterol (BREO ELLIPTA) 200-25 MCG/INH inhaler Inhale 1 puff into the lungs daily   8/29/2021     insulin aspart (NOVOLOG FLEXPEN) 100 UNIT/ML pen Inject 3-11 Units Subcutaneous With meals and at bedtime.   8/29/2021     levothyroxine (SYNTHROID/LEVOTHROID) 25 MCG tablet Take 25 mcg by mouth daily   9/5/2021 at 0812     omeprazole (PRILOSEC) 40 MG DR capsule Take 40 mg by mouth 2 times daily (before meals)   9/5/2021 at 0811     potassium chloride ER (KLOR-CON M) 20 MEQ CR tablet Take 20 mEq by mouth daily   8/29/2021     sulfamethoxazole-trimethoprim (BACTRIM DS) 800-160 MG tablet Take 1 tablet by mouth Every Mon, Wed, Fri Morning    8/30/2021       ROS: unobtainable    Physical Exam:  Temp:  [98.8  F (37.1  C)-101.5  F (38.6  C)] 98.8  F (37.1  C)  Pulse:  [72-98] 76  Resp:  [21-36] 21  BP: ()/(52-98) 153/94  FiO2 (%):  [45 %-50 %] 45 %  SpO2:  [93 %-98 %] 95 %    Gen: NAD, intubated and sedated  Lungs: vented  CV: regular rhythm, normal rate  Abd: soft, non distended  Ext: warm and well perfused  Neuro: AOx3    Labs:  [facility]   Recent Labs   Lab 10/28/21  5468  10/27/21  0335 10/26/21  0522 10/25/21  1136   PH 7.51* 7.50* 7.56* 7.51*   PCO2 37 36 30* 34*   PO2 114* 130* 92 87   HCO3 30* 28 27 27     CBC  Recent Labs   Lab 10/28/21  0530 10/27/21  0523 10/26/21  1020 10/25/21  0326   WBC 41.0* 37.3* 35.8* 32.8*   HGB 10.3* 10.8* 9.9* 10.6*    273 341 407     BMP  Recent Labs   Lab 10/28/21  0530 10/28/21  0524 10/28/21  0402 10/28/21  0311 10/27/21  2220 10/27/21  2130 10/27/21  1806 10/27/21  1635 10/27/21  0621 10/27/21  0523 10/26/21  1717 10/26/21  1452 10/26/21  0931 10/26/21  0637     --   --   --   --  147*  --  148*  --  152*   < > 151*   < > 148*   POTASSIUM 5.0  --   --   --   --   --   --  5.2  --  4.4  --  5.0   < > 4.1   CHLORIDE 110*  --   --   --   --   --   --   --   --  120*  --  121*  --  117*   CO2 30  --   --   --   --   --   --   --   --  27  --  25  --  25   BUN 60*  --   --   --   --   --   --   --   --  60*  --  63*  --  62*   CR 1.04  --   --   --   --   --   --   --   --  0.90  --  1.16  --  1.11   * 164* 164* 171*   < >  --    < >  --    < > 208*   < > 244*   < > 206*    < > = values in this interval not displayed.     LFT  Recent Labs   Lab 10/28/21  0530 10/27/21  0523 10/26/21  0637 10/25/21  2113 10/25/21  1609 10/25/21  1609   AST 21 27 21 21   < > 20   ALT 47 71* 71* 84*   < > 88*   ALKPHOS 130 160* 160* 157*   < > 163*   BILITOTAL 0.7 0.3 0.3 0.3   < > 0.5   ALBUMIN 1.7* 1.8* 1.8* 1.6*   < > 1.6*   INR 1.13 1.16* 1.26*  --   --  1.27*    < > = values in this interval not displayed.     PancreasNo lab results found in last 7 days.          Vincent Maravilla MD

## 2021-10-28 NOTE — PROGRESS NOTES
Thoracic Surgery Progress Note    Scheduled for tracheostomy/PEGJ at 0730 tomorrow. Please hold heparin 4 hours before going down to OR, and hold 24 hours post op.    Yanick Dubois MD, PhD, PGY-3  General Surgery

## 2021-10-28 NOTE — PROGRESS NOTES
VEEG monitoring preliminary results:    VEEG has been running for over one hour.  EEG showed severe generalized slowing with mixed theta and delta activities. Findings are consistent with severe diffuse encephalopathy.  No epileptiform activities, no clinical or electrographic seizures were observed.      Mango Reno MD  Neurology

## 2021-10-28 NOTE — PROGRESS NOTES
[facility]  Transplant Infectious Disease Progress Note     Patient:  Edson Thornton, Date of birth 1965, Medical record number [id number]  Date of Visit:  10/28/2021         Assessment and Recommendations:   Recommendations:  - Continue fluconazole 400 mg daily through at least 11/8/21 (two weeks from the date of the PICC line's removal; for the candidemia and Candida empyema).  Monitor tacrolimus levels.  - Continue TMP-SMX and acyclovir prophylaxis.  - Other additional antimicrobial therapy does not seem to be presently indicated.  - Check a procalcitonin assay.  - As discussed with Neurology Consult, would send CSF fungal culture, CRAG, and herpesviral PCRs, and save a CSF tube from the lumbar puncture (for future studies that might be needed later).    Transplant ID will follow with you.    Prem Soares MD  Pager [phone]    Assessment:  A 56 year old gentleman immunosuppressed (tacrolimus, mycophenolate, prednisone) s/p a 10/16/21 bilateral lung transplant for NSIP / ILD with rheumatoid arthritis who also has a history of bronchiectasis, moderate PH, SALTY, chronic HSV infection, hypogammaglobulinemia, steroid-induced diabetes, hypothyroidism, hypertension, hyperlipidemia, duodenal anomaly, anxiety, and depression.  He was admitted to [facility] on 9/5/21 for acute on chronic respiratory failure due to an ILD exacerbation and underwent lung transplant on 10/16/21.  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  He had fevers on 10/19/21 and on 10/20/21 and a corresponding 10/20/21 blood culture grew pan-susceptible Candida albicans.  He is stable but persistent intubated (on low vent settings, awaiting tracheostomy) with a multifactorial encephalopathy and persistent leukocytosis.    ID issues:    - Candidemia in a single 10/20/21 blood culture:  Although only one blood culture (out of six post-transplant to that point) grew Candida, that positive blood culture  did correspond to a significant fever spike on 10/20/21 late PM and he had a strongly positive 10/20/21 Fungitell assay, so this very possibly represented a true candidemia and needs to be treated as such.  He is at risk for candidemia due to the presence of multiple lines.  There is no visible site of organ involvement or of hepatosplenic candidiasis on the 10/22/21 and 10/25/21 chest / abdomen CT scans (except perhaps the left pleural effusion).  A 10/23/21 TTE showed no evidence of valvular vegetations.   A 10/24/21 Ophthalmology Consult examination was benign and 10/22/21 x 2, 10/23/21 x 2, and 10/25/21 x 3 surveillance blood cultures are all without growth to date.  Empiric therapy with IV micafungin at Candida treatment dose 100 mg daily has been appropriate to give a two week course through 11/8/21 from the date of the removal of the PICC line on 10/25/21 (with other lines removed previously), but since the C albicans is quite susceptible to fluconazole (TESSIE 0.5) and his LFTs have normalized, finishing out the treatment course with fluconazole on 10/26/21 would be more elegant -- Pulmonary Transplant Consult service agrees and will adjust the tacrolimus dosing (as needed) accordingly.  If surveillance blood cultures are still without growth, a new central line can be placed after a 48 hour line holiday, on 10/27/21.    - Isolation of yeast from 10/25/21 left pleural fluid:  We await the associated culture, fluid cell count (if sent), and cytology, but the candidemia or the transplant itself may possibly have seeded the left pleural space, in which case a longer course of antifungal therapy may be necessary instead of a straight two weeks.    - New, acute, cryptogenic 10/25/21 leukocytosis / fever:  Etiology is unclear -- perhaps due to the left fungal empyema.  This fever is unlikely due to the candidemia itself, since his blood cultures cleared quickly as of 10/22/21.  Other fever evaluation to date has been  negative.  Would not add additional antimicrobials as yet unless a clear new fever source or pathogen is identified (in the absence of fulminant sepsis).    - Sputum Gram stain from 10/25/21 showing Gram positive cocci:  Gram positive cocci in sputum may very well be normal jean marie (coagualse negative Staph) in the absence of other new indicators of a new pneumonia, so would not treat this while awaiting the culture result.  The 10/26/21 chest x-ray shows no new consolidations consistent with pneumonia (and actually seems to be improving).    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    Old ID issues:  - Concern for possible Strongyloides exposure (because a [place]  and in multiple countries with endemic Strongyloides) pre-transplant so received ivermectin dose on 9/17/21.  - Plan for monthly Coccidioides Ag x12 months post-transplant per ID (urine and serum negative 10/17), next due 11/17 (not yet ordered).  Has calcified granulomas on chest CT.  Has a history of residing in a [place]occid[place] endemic areas [place] and/or Histoplasma endemic area [place]   - Pre-transplant indeterminate Quantiferon assay:  Assessed 9/16/21 by Transplant ID.  Had been in the [employer] and was stationed in many states including [place] [facility]. Also was stationed in [facility] [place] [facility] and [facility] [place].  He was tested with tuberculin skin test on numerous occasions before he became immunocompromised and was never positive. In addition, he was tested with QuantIFERON on 5/11/2021 prior to, or within 24 hr of receiving 10 mg/day of prednisone, the QuantiFERON was negative.  Deemed to no have LTBI.  - Pneumonia treated with a short course of antibiotics in [place].      Other ID issues:  - QTc interval:  466 msec on 10/22/21 EKG.  - Bacterial prophylaxis:  None  indicated, has been on post-transplant ceftazidime.  - Pneumocystis prophylaxis:  On TMP-SMX prior to transplant, now held for BRENNAN.  - Viral serostatus & prophylaxis:  CMV negative.  Chronic history of intermittent active HSV oral outbreaks, on acyclovir.  EBV / VZV seropositive.  - Fungal prophylaxis:  On treatment micafungin.  - Immunization status:  Not presently relevant.  Did not receive influenza, pneumococcal, or Covid-19 vaccines pre-transplant.  - Gamma globulin status:  History of hypogammaglobulinemia.  Received IVIG on 10/21/21.  - Isolation status: Routine.        Interval History:   Mr. Thornton remainsfebrile (T max 101.8 degrees F) since 10/24/21 midnight with a persistent rising leukocytosis now up to 41.0 today (versus 20 - 23K from 10/22 - 24/21), despite ongoing fluconazole (since 10/26/21, for candidemia and left Candida empyema) plus plus TMP-SMX (resumed 10/25/21) and acyclovir prophylaxis (since 10/16/21 transplant).  He remains intubated and quite non-responsive without sedation on a ventilator at low settings (FiO2 0.45, PEEP 5) -- tracheostomy and PEG tube placement are planned for tomorrow.  He is on full tube feeds.  A lumbar puncture is planned by Neurology Consult.  He has beendiuresed although his weight remains above his admission wieght.  The right arm PICC was removed 10/25/21 PM and a central line holiday is in progress.  A 10/25/21 left pleural fluid culture grew 2+ C albicans.  The 10/20/21 blood culture and 10/20/21 and 10/22/21 ETT sputum cultures also all grew Candida albicans, with the blood culture isolate pan-susceptible (micafungin TESSIE <0.03, fluconazole TESSIE 0.5).  The other 10/20/21 blood culture as well as 10/22/21 x 2, 10/23/21 x 2, and 10/25/21 x 3 surveillance blood cultures all are without growth to date.  The 10/23/21 serum cryptococcal antigen assay was negative.  10/22/21 and 10/25/21 abdominal CT scans showed no evidence of hepatosplenic candidiasis or other  intrabdominal infection.  The 10/25/21 chest CT and 10/26/21 chest x-ray show improving bilateral infiltrates.  10/23 TTE showed no vegetations.  A 10/24/21 Ophthalmology Consult examination was benign.        History of Infectious Disease Illness (copied from the 10/23/21 Transplant ID Consult Note):   A 56 year old gentleman immunosuppressed (tacrolimus, mycophenolate, prednisone) s/p a 10/16/21 bilateral lung transplant for NSIP / ILD with rheumatoid arthritis who also has a history of bronchiectasis, moderate PH, SALTY, chronic HSV infection, hypogammaglobulinemia, steroid-induced diabetes, hypothyroidism, hypertension, hyperlipidemia, duodenal anomaly, anxiety, and depression.  He was admitted to [facility] on 9/5/21 for acute on chronic respiratory failure due to an ILD exacerbation and underwent lung transplant on 10/16/21.  The transplant surgery was complicated by early low cardiac index and immediate post-operative PGD as well as later atrial fibrillation with RVR on 10/18/21 as well as BRENNAN.  He is now day 7 post transplant and remains intubated in the CVICU on inhaled nitrous oxide with compromised capacity to wean off sedation.  He had immediate post-operative fevers for ~ 24 hours, then was afebrile for ~ 1.5 days, then respiked fevers up to 101.3 degrees F on 10/19/21 late PM and 10/20/21 late PM.  He had another low grade fever to 100.9 degrees on 10/22/21 PM.  He has been on empiric ceftazidime since transplant, as well as acyclovir prophylaxis (and was on TMP-SMX prophylaxis prior to transplant).  His post-operative peripheral WBC peaked at 44.5 on 10/18/21, fell to 27.4 by 10/20/21, and is now at 21.3.  Blood cultures x 4 obtained on 10/17/21 were negative, but one of two blood cultures peripherally drawn on 10/20/21 early AM (with the fever spike) grew yeast (ID pending) on 10/22/21 at 52 hours of incubation; the other peripherally drawn blood culture lacks growth to date.  Surveillance blood cultures x 2  from 10/22/21 afternoon are without growth to date.  A Fungitell assay was positive at 399 on 10/20/21.  Of note, respiratory (broncial washing, ETT sputum) cultures obtained 10/17/21 and 10/20/21 have grown Candida albicans along with Staph epidermidis and other normal jean marie.  IV micafungin 100 mg daily was instituted on 10/22/21 late AM.  A 10/22/21 chest / abdominal CT scan did not reveal any likely source.  He has a history of potential Coccidioides exposure, but a 10/17/21 Coccidioides antibody assay was negative and a Coccidioides antigen assay is pending.  This morning he had some blood in his OG tube and underwent upper endoscopy.  He remains intubated but is opening his eyes to auditory stimulation and tracking today, although not moving to request.  He is hemodynamically stable.    Exposure History:  Had been in the [employer] and was stationed in many states including [facility]. Also was stationed in [facility] and maybe [place].  Has a history of residing in a [place]idioides endemic areas [place] and/or Histoplasma endemic area ([place]  Extensive travel as a  within the [place].      Transplants:  10/16/2021 (Lung), Postoperative day:  12.  Coordinator Kassandra Estrada    Review of Systems:  ROS is unobtainable because he is intubated, sedated, and unresponsive on the ventilator.    Past Medical History:   Diagnosis Date     BRENNAN (acute kidney injury) (H) 10/17/2021     Anxiety      Depression      HLD (hyperlipidemia)      HTN (hypertension)      Hypothyroidism      ILD (interstitial lung disease) (H)      SALTY on CPAP      Oxygen dependent     BL 4L since ~6/2021     Rheumatoid arthritis (H)     signs ~5/2020, dx 5/2021     S/P lung transplant (H) 10/16/2021     Shock liver 10/17/2021     Steroid-induced hyperglycemia      Traction bronchiectasis (H)      Past Surgical History:   Procedure Laterality Date     COLONOSCOPY W/ BIOPSIES AND POLYPECTOMY  07/21/2020     CV  CORONARY ANGIOGRAM N/A 9/8/2021    Procedure: Coronary Angiogram with possible intervention;  Surgeon: Jovon Bullock MD;  Location:  HEART CARDIAC CATH LAB     CV RIGHT HEART CATH MEASUREMENTS RECORDED N/A 9/8/2021    Procedure: Right Heart Cath;  Surgeon: Jovon Bullock MD;  Location:  HEART CARDIAC CATH LAB     ESOPHAGOSCOPY, GASTROSCOPY, DUODENOSCOPY (EGD), COMBINED N/A 10/23/2021    Procedure: ESOPHAGOGASTRODUODENOSCOPY (EGD);  Surgeon: Miquel Pisano MD;  Location:  GI     EXTRACTION(S) DENTAL N/A 9/22/2021    Procedure: EXTRACTION tooth #19;  Surgeon: Deepak Tobin DDS;  Location: [facility] OR     HERNIA REPAIR       right acl       TRANSPLANT LUNG RECIPIENT SINGLE X2 Bilateral 10/16/2021    Procedure: TRANSPLANT, LUNG, RECIPIENT, BILATERAL, Bronchoscopy, on-pump perfusion, bilateral clamshell sternotomy;  Surgeon: Yanick Corral MD;  Location:  OR     XR ACROMIOCLAVICULAR JOINT BILATERAL       Family History   Problem Relation Age of Onset     Diabetes Type 1 Mother      Heart Disease Mother      Chronic Obstructive Pulmonary Disease Mother      Rheumatoid Arthritis Father      Emphysema Paternal Grandfather      Social History     Tobacco Use     Smoking status: Never Smoker     Smokeless tobacco: Never Used   Substance Use Topics     Alcohol use: Never     Drug use: Never       There is no immunization history on file for this patient.    Patient Active Problem List   Diagnosis     S/P lung transplant (H)     BRENNAN (acute kidney injury) (H)     Shock liver          Current Medications & Allergies:       acetaminophen  975 mg Oral or Feeding Tube Q8H MARKY     acetylcysteine  2 mL Nebulization 4x Daily     acyclovir  400 mg Oral or NG Tube BID     [START ON 10/29/2021] amiodarone  200 mg Oral or Feeding Tube Daily     amLODIPine  10 mg Oral or Feeding Tube Daily     bumetanide  2 mg Intravenous TID     calcium carbonate 600 mg-vitamin D 400 units  1 tablet Oral or  Feeding Tube BID w/meals     carvedilol  12.5 mg Oral or Feeding Tube BID     fluconazole  400 mg Oral Daily     hydrALAZINE  100 mg Oral or Feeding Tube Q6H     insulin glargine  5 Units Subcutaneous QAM AC     isosorbide dinitrate  40 mg Oral or Feeding Tube TID     levalbuterol  1.25 mg Nebulization 4x Daily     levothyroxine  25 mcg Oral Daily     meropenem  1 g Intravenous Q8H     multivitamins w/minerals  15 mL Per Feeding Tube Daily     mycophenolate  1,500 mg Oral or NG Tube BID     nystatin  1,000,000 Units Swish & Swallow 4x Daily     ofloxacin  5 drop Right Ear BID     pantoprazole  40 mg Per Feeding Tube BID AC     polyethylene glycol  17 g Oral or Feeding Tube Daily     potassium chloride  20 mEq Oral Daily     [START ON 10/31/2021] predniSONE  15 mg Oral QAM    And     [START ON 10/31/2021] predniSONE  12.5 mg Oral QPM     predniSONE  15 mg Per Feeding Tube BID     protein modular  1 packet Per Feeding Tube TID     rosuvastatin  10 mg Oral or Feeding Tube Daily     senna-docusate  1 tablet Oral or Feeding Tube BID     sodium chloride (PF)  3 mL Intracatheter Q8H     sulfamethoxazole-trimethoprim  10 mL Oral or NG Tube Daily    Or     sulfamethoxazole-trimethoprim  1 tablet Oral or NG Tube Daily     tacrolimus  2 mg Per OG Tube BID IS     Infusions/Drips:      dextrose       dextrose Stopped (10/24/21 1008)     heparin 1,000 Units/hr (10/28/21 1600)     insulin regular 9 Units/hr (10/28/21 1701)     BETA BLOCKER NOT PRESCRIBED       No Known Allergies         Physical Exam:     Patient Vitals for the past 24 hrs:   BP Temp Temp src Pulse Resp SpO2 Weight   10/28/21 1702 -- -- -- 84 -- 95 % --   10/28/21 1600 118/67 (!) 100.6  F (38.1  C) -- 87 (!) 32 95 % --   10/28/21 1500 118/66 100.4  F (38  C) -- 89 28 93 % --   10/28/21 1400 122/71 100.2  F (37.9  C) -- 81 27 94 % --   10/28/21 1300 107/62 100.2  F (37.9  C) -- 83 -- 94 % --   10/28/21 1200 96/75 100  F (37.8  C) Bladder 78 26 91 % --   10/28/21  1155 -- -- -- 75 -- 91 % --   10/28/21 1100 105/58 99.9  F (37.7  C) -- 74 25 93 % --   10/28/21 1000 101/56 99.5  F (37.5  C) -- 77 28 93 % --   10/28/21 0900 94/53 99.1  F (37.3  C) -- 72 21 93 % --   10/28/21 0834 -- -- -- 76 -- 95 % --   10/28/21 0800 (!) 153/94 98.8  F (37.1  C) -- 82 21 97 % --   10/28/21 0700 115/73 99.1  F (37.3  C) -- 73 26 98 % --   10/28/21 0600 (!) 110/98 99.1  F (37.3  C) -- 76 26 97 % --   10/28/21 0500 115/74 99.1  F (37.3  C) -- 78 25 98 % --   10/28/21 0400 133/83 99.1  F (37.3  C) Bladder 74 26 97 % --   10/28/21 0300 112/71 99.5  F (37.5  C) -- 72 23 98 % --   10/28/21 0200 108/69 99.7  F (37.6  C) -- 87 (!) 31 95 % --   10/28/21 0100 136/84 99.9  F (37.7  C) -- 87 28 98 % 68.6 kg (151 lb 3.8 oz)   10/28/21 0000 124/73 100  F (37.8  C) Bladder 80 (!) 33 97 % --   10/27/21 2300 112/69 (!) 100.6  F (38.1  C) -- 80 30 98 % --   10/27/21 2200 106/68 (!) 100.8  F (38.2  C) -- 79 (!) 34 97 % --   10/27/21 2100 133/78 (!) 100.6  F (38.1  C) -- 91 29 97 % --   10/27/21 2033 -- -- -- -- -- 97 % --   10/27/21 2000 132/76 (!) 100.6  F (38.1  C) Bladder 91 28 97 % --   10/27/21 1900 104/59 (!) 100.9  F (38.3  C) -- 93 (!) 31 97 % --   10/27/21 1800 104/61 (!) 101.5  F (38.6  C) -- 98 (!) 34 96 % --     Ranges for vital signs over the past 24 hours:  Temp:  [98.8  F (37.1  C)-101.5  F (38.6  C)] 100.6  F (38.1  C)  Pulse:  [72-98] 84  Resp:  [21-34] 32  BP: ()/(53-98) 118/67  FiO2 (%):  [45 %-50 %] 45 %  SpO2:  [91 %-98 %] 95 %  Vitals:    10/26/21 0230 10/27/21 0400 10/28/21 0100   Weight: 73.8 kg (162 lb 11.2 oz) 71.8 kg (158 lb 4.6 oz) 68.6 kg (151 lb 3.8 oz)     Intake/Output Summary (Last 24 hours) at 10/28/2021 1749  Last data filed at 10/28/2021 1600  Gross per 24 hour   Intake 3795.24 ml   Output 3366 ml   Net 429.24 ml     Physical Examination:  GENERAL:  Intubated, non-responsive, WDWN, 56 year old man in NAD on the ventilator.  HEAD:  NCAT.  EYES:  PERRL, anicteric  sclerae.  ENT:  No otorrhea.  Oral ETT.  Left nares NJ tube / OG tube.  No anterior oral lesion.  NECK:  Supple.  LYMPH:  No cervical lymphadenopathy.  LUNGS:  Decreased posterior bibasilar breath sounds, anterior clear to auscultation bilaterally.  Bilateral inferior chest tubes.  CHEST:  Wound VAC on clamshell incision.  Chest tubes present.  CARDIOVASCULAR:  RRR, without murmur.  ABDOMEN: Bowel sounds present, soft, nontender by monitor to palpation.  :  Watson with leena urine.  EXTREMITIES:  Distally warm, no edema.  SKIN:  No acute rash or lesion.  Removed right arm PICC line site lacks inflammation.  Peripheral IV present without inflammation.  NEUROLOGIC:  Unresponsive, does not move to request.         Laboratory Data:     No results found for: ACD4    Inflammatory Markers    Recent Labs   Lab Test 10/14/21  0943 10/12/21  1038 09/07/21  1324 09/06/21  0633   CRP 23.0* 17.0*   < >  --    PSA  --   --   --  0.56    < > = values in this interval not displayed.     Immune Globulin Studies     Recent Labs   Lab Test 10/15/21  0907 09/07/21  1324 09/07/21  1100   * 363*  363*  --    IGM  --  51  --    IGE  --   --  83   IGA  --  103  --      Metabolic Studies       Recent Labs   Lab Test 10/28/21  0530 10/27/21  2220 10/27/21  2130 10/27/21  1806 10/27/21  1635 10/27/21  0621 10/27/21  0523 10/26/21  1717 10/26/21  1452 10/23/21  2108 10/23/21  2016 10/22/21  1602 10/22/21  1601 10/22/21  0959 10/22/21  0958 10/20/21  1004 10/20/21  0957 10/07/21  1811 10/07/21  1727 09/07/21  1100 09/07/21  0928     --  147*   < > 148*   < > 152*   < > 151*   < >  --    < > 147*   < > 147*   < > 146*   < >  --    < >  --    POTASSIUM 5.0  --   --   --  5.2   < > 4.4   < > 5.0   < >  --    < > 3.6   < > 3.5   < > 4.1   < >  --    < >  --    CHLORIDE 110*  --   --   --   --   --  120*   < > 121*   < >  --    < > 109   < > 106   < > 107   < >  --    < >  --    CO2 30  --   --   --   --   --  27   < > 25   < >  --     < > 31   < > 34*   < > 34*   < >  --    < >  --    ANIONGAP 3  --   --   --   --    < > 5   < > 5   < >  --    < > 7   < > 7   < > 5   < >  --    < >  --    BUN 60*  --   --   --   --    < > 60*   < > 63*   < >  --    < > 68*   < > 62*   < > 54*   < >  --    < >  --    CR 1.04  --   --   --   --   --  0.90   < > 1.16   < >  --    < > 1.47*   < > 1.54*   < > 1.60*   < >  --    < >  --    GFRESTIMATED 80  --   --   --   --    < > >90   < > 70   < >  --    < > 53*   < > 50*   < > 47*   < >  --    < >  --    *   < >  --    < >  --    < > 208*   < > 244*   < >  --    < > 148*   < > 92   < > 45*   < >  --    < >  --    A1C  --   --   --   --   --   --   --   --   --   --   --   --   --   --   --   --   --   --   --   --  6.6*   ERIK 9.0  --   --   --   --   --  9.1   < > 8.6   < >  --    < > 8.2*   < > 7.9*   < > 8.0*   < >  --    < >  --    PHOS 4.2  --   --    < > 3.2   < > 2.6   < > 3.6   < >  --    < > 2.8  --   --    < >  --    < >  --    < >  --    MAG 1.8  --   --   --   --    < > 2.0   < > 1.8   < >  --    < > 2.1  --   --    < >  --    < >  --    < >  --    LACT  --   --   --   --   --   --   --   --  1.3  --  0.5*   < >  --    < >  --    < >  --    < >  --    < >  --    PCAL  --   --   --   --   --   --   --   --   --   --   --   --   --   --   --   --   --   --  <0.05   < >  --    FGTL  --   --   --   --   --   --   --   --   --   --   --   --   --   --   --   --  399  --   --   --   --    CKT  --   --   --   --   --   --   --   --   --   --   --   --  51  --  55  --   --   --   --    < >  --     < > = values in this interval not displayed.     Hepatic Studies    Recent Labs   Lab Test 10/28/21  0530 10/27/21  0523 10/27/21  0523 10/26/21  0637 10/26/21  0637 10/25/21  1609 10/25/21  1141 10/22/21  1601 10/22/21  0958 09/30/21  1059 09/19/21  1629   BILITOTAL 0.7  --  0.3   < > 0.3   < >  --    < > 0.3   < >  --    DBIL  --   --   --   --  0.1   < >  --    < > 0.1   < >  --    ALKPHOS 130   < > 160*   < >  160*   < >  --    < > 156*   < >  --    PROTTOTAL 5.9*   < > 6.2*   < > 5.8*   < >  --    < > 5.1*   < >  --    ALBUMIN 1.7*   < > 1.8*   < > 1.8*   < >  --    < > 1.1*   < >  --    AST 21   < > 27   < > 21   < >  --    < > 27   < >  --    ALT 47   < > 71*   < > 71*   < >  --    < > 281*   < >  --    LDH  --   --   --   --   --   --   --   --   --   --  423*   DOREEN  --   --   --   --   --   --  29  --  39  --   --     < > = values in this interval not displayed.     Pancreatitis testing    Recent Labs   Lab Test 10/22/21  0407 09/07/21  1324 09/07/21  1100   AMYLASE  --   --  32   TRIG 307*   < >  --     < > = values in this interval not displayed.     Hematology Studies   Recent Labs   Lab Test 10/28/21  0530 10/27/21  0523 10/27/21  0523 10/26/21  1020 10/26/21  1020 10/25/21  0326 10/25/21  0326 10/24/21  0410 10/23/21  0344 10/23/21  0344 10/22/21  0407   WBC 41.0*  --  37.3*  --  35.8*  --  32.8* 21.4*   < > 21.3*   < >   ANEU  --   --   --   --   --   --   --  19.9*  --  20.0*  --    ANEUTAUTO 36.5*  --  33.0*   < > 30.1*   < > 28.0*  --   --   --    < >   ALYM  --   --   --   --   --   --   --  0.4*   < > 0.2*  --    ALYMPAUTO 1.6   < > 1.4   < > 1.8   < > 1.2  --   --   --    < >   DONALD  --   --   --   --   --   --   --  0.9   < > 1.1  --    AMONOAUTO 1.7*   < > 1.8*   < > 2.3*   < > 2.1*  --   --   --    < >   AEOS  --   --   --   --   --   --   --  0.0   < > 0.0  --    AEOSAUTO 0.0   < > 0.0   < > 0.0   < > 0.0  --   --   --    < >   ABSBASO 0.1   < > 0.1   < > 0.1   < > 0.1  --   --   --    < >   HGB 10.3*  --  10.8*   < > 9.9*   < > 10.6* 9.8*   < > 9.6*   < >   HCT 33.2*  --  35.3*   < > 32.0*   < > 33.1* 30.5*   < > 30.2*   < >     --  273   < > 341   < > 407 201   < > 132*   < >    < > = values in this interval not displayed.     Clotting Studies    Recent Labs   Lab Test 10/28/21  0530 10/27/21  0523 10/26/21  0637 10/25/21  1609 10/23/21  0344 10/22/21  1407   INR 1.13 1.16* 1.26* 1.27*   <  > 1.28*   PTT  --   --   --   --   --  43*    < > = values in this interval not displayed.     Iron Testing    Recent Labs   Lab Test 10/28/21  0530 10/23/21  0344 10/22/21  0959 09/19/21  1629 09/19/21  1304 09/10/21  0616 09/09/21  0536   IRON  --   --   --   --   --   --  146   FEB  --   --   --   --   --   --  190*   IRONSAT  --   --   --   --   --   --  77*   ARMEN  --   --   --   --   --   --  1,049*   MCV 98   < >  --    < > 90   < > 87   HAPT  --   --  380*   < >  --   --   --    RETP  --   --   --   --  5.2*  --   --    RETICABSCT  --   --   --   --  0.177*  --   --     < > = values in this interval not displayed.     Autoimmune Testing    Recent Labs   Lab Test 09/07/21  1324   RNPIGG Negative   SMIGG Negative   SSAIGG Negative   SSBIGG Negative     Arterial Blood Gas Testing    Recent Labs   Lab Test 10/28/21  0502 10/27/21  0335 10/26/21  0522 10/25/21  2113 10/25/21  1136 10/25/21  1136 10/25/21  0825 10/25/21  0825   PH 7.51* 7.50* 7.56*  --   --  7.51*  --  7.52*   PCO2 37 36 30*  --   --  34*  --  34*   PO2 114* 130* 92  --   --  87  --  104   HCO3 30* 28 27  --   --  27  --  28   O2PER 50 60 55 55   < > 50   < > 50    < > = values in this interval not displayed.     Thyroid Studies     Recent Labs   Lab Test 09/07/21  1100 09/06/21  0633   TSH 0.11* 0.19*   T4 0.68* 0.73*     Urine Studies     Recent Labs   Lab Test 10/25/21  1507 10/22/21  1641 10/17/21  1642 10/17/21  1041 10/15/21  1127   URINEPH 5.5 7.5* 5.0 5.0 5.5   NITRITE Negative Negative Negative Negative Negative   LEUKEST Negative Negative Negative Trace* Negative   WBCU 2 3 25* 44* 1     Medication levels    Recent Labs   Lab Test 10/28/21  0530 10/18/21  0549 10/18/21  0403   VANCOMYCIN  --   --  12.6   TACROL 6.8   < >  --     < > = values in this interval not displayed.     Microbiology:    Fungal testing  Recent Labs   Lab Test 10/20/21  0957   FGTL 399   FGTLI Positive*     Last Culture results with specimen source  Culture   Date  Value Ref Range Status   10/26/2021 2+ Normal jean marie  Final   10/26/2021 Yeast (A)  Preliminary   10/25/2021 1+ Normal jean marie  Final   10/25/2021 2+ Candida albicans (A)  Final     Comment:     Susceptibilities not routinely done   10/25/2021 Yeast (A)  Preliminary   10/25/2021 No growth after 2 days  Preliminary   10/25/2021 No growth after 3 days  Preliminary   10/25/2021 No growth after 3 days  Preliminary   10/25/2021 No growth after 3 days  Preliminary   10/25/2021 No growth after 2 days  Preliminary   10/23/2021 No Growth  Final   10/23/2021 No Growth  Final   10/22/2021 1+ Normal jean marie  Final   10/22/2021 3+ Candida albicans (A)  Final   10/22/2021 Positive on the 5th day of incubation (A)  Corrected   10/22/2021 Yeast (AA)  Corrected     Comment:     1 of 2 bottles   10/22/2021 Positive on the 3rd day of incubation (A)  Final   10/22/2021 Candida albicans (AA)  Final     Comment:     1 of 2 bottles  Susceptibilities done on previous cultures    No results found for: SDES     Last check of C difficile  C Difficile Toxin B by PCR   Date Value Ref Range Status   10/27/2021 Negative Negative Final     Comment:     A negative result does not exclude actual disease due to C. difficile and may be due to improper collection, handling and storage of the specimen or the number of organisms in the specimen is below the detection limit of the assay.       Quantiferon testing   Recent Labs   Lab Test 10/28/21  0530 10/27/21  0523 09/16/21  0645 09/07/21  1324   TBRES  --   --   --  Indeterminate*   LYMPH 4 4   < > 2    < > = values in this interval not displayed.     Virology:    Coronavirus-19 testing    Recent Labs   Lab Test 10/24/21  1644 10/17/21  1329 10/15/21  0614 10/08/21  1414 09/15/21  1216 09/07/21  1324 09/06/21  0010 08/30/21  0752 08/02/21  1230 06/20/20  1454   CD19  --   --   --   --   --  <1*  --   --   --   --    ACD19  --   --   --   --   --  1*  --   --   --   --    [id number] Negative Negative  Negative Negative   < >  --    < >  --   --   --    COVIDPCREXT  --   --   --   --   --   --   --  Not Detected Not Detected Not Detected    < > = values in this interval not displayed.     Respiratory virus (non-coronavirus-19) testing    Recent Labs   Lab Test 10/17/21  1331   IFLUA Negative   FLUAH1 Negative   [id number] Negative   FLUAH3 Negative   IFLUB Negative   PIV1 Negative   PIV2 Negative   PIV3 Negative   HRVS Negative   RSVA Negative   RSVB Negative   HMPV Negative   ADVBE Negative   ADVC Negative     CMV viral loads    Recent Labs   Lab Test 10/26/21  1540   CMVQNT Not Detected       Hepatitis B Testing     Recent Labs   Lab Test 10/15/21  0907 09/16/21  2157 09/07/21  1324 09/07/21  1100   AUSAB 0.12  --   --  0.51   HBCAB Nonreactive  --   --  Nonreactive   HEPBANG Nonreactive Nonreactive   < >  --     < > = values in this interval not displayed.     Hepatitis C Antibody   Date Value Ref Range Status   10/15/2021 Nonreactive Nonreactive Final   09/08/2021 Nonreactive Nonreactive Final       CMV Antibody IgG   Date Value Ref Range Status   10/15/2021 No detectable antibody. No detectable antibody.  Final   09/07/2021 No detectable antibody. No detectable antibody.  Final     Varicella Zoster Antibody IgG   Date Value Ref Range Status   09/07/2021 Positive (A) No detectable antibody.  Final     Comment:     Suggests previous exposure or immunization and probable immunity.     EBV Capsid Antibody IgG   Date Value Ref Range Status   10/15/2021 Positive (A) No detectable antibody. Final     Comment:     Suggests recent or past exposure.   09/07/2021 Positive (A) No detectable antibody. Final     Comment:     Suggests recent or past exposure.     Toxoplasma Antibody IgG   Date Value Ref Range Status   09/07/2021 <3.0 0.0 - 7.1 IU/mL Final     Comment:     Negative- Absence of detectable Toxoplasma gondii IgG antibodies. A negative result does not rule out acute infection.     Herpes Simplex Virus Type 1 IgG  Antibody   Date Value Ref Range Status   10/15/2021 Positive.  IgG antibody to HSV-1 detected. (A) No HSV-1 IgG antibodies detected Final   09/07/2021 Positive.  IgG antibody to HSV-1 detected. (A) No HSV-1 IgG antibodies detected Final     Herpes Simplex Virus Type 2 IgG Antibody   Date Value Ref Range Status   10/15/2021 Positive.  IgG antibody to HSV-2 detected. (A) No HSV-2 IgG antibodies detected Final   09/07/2021 Positive.  IgG antibody to HSV-2 detected. (A) No HSV-2 IgG antibodies detected Final     Imaging:  Recent Results (from the past 48 hour(s))   XR Chest Port 1 View    Narrative    EXAM: XR CHEST PORT 1 VIEW  10/27/2021 1:31 AM     HISTORY:  s/p lung transplant       COMPARISON:  10/26/2021    TECHNIQUE: Portable AP radiograph of the chest.    FINDINGS:   Endotracheal tube tip projects over the mid to low thoracic trachea  approximately 2.4 cm above the shiv. Feeding tube and enteric tube  course below the diaphragm and beyond the field-of-view. Stable  mediastinal and bilateral pleural chest tubes. Intact median  sternotomy wires.    The trachea is midline. Stable heart size with slightly more distinct  margins. Slightly improved perihilar and basilar opacities. No large  pleural effusion. No appreciable pneumothorax.      Impression    IMPRESSION:   1. Slightly improved perihilar and basilar opacities, likely  postoperative atelectasis.  2. Endotracheal tube is in borderline low position. Consider slight  retraction.  3. Additional support devices are stable.    I have personally reviewed the examination and initial interpretation  and I agree with the findings.    MAURICIO NAVAS MD         SYSTEM ID:  [id number]   XR Chest Port 1 View    Narrative    EXAMINATION: XR CHEST PORT 1 VIEW, 10/27/2021 5:00 PM    INDICATION: Chest tube removal    COMPARISON: Chest x-ray 10/27/2021 at 12:55 AM.    FINDINGS: Single portable 40 degree upright AP radiograph of the  chest. ET tube projects at the mid thoracic  trachea. 2 enteric tubes  course below the left hemidiaphragm, tips are now within view.  Interval removal of mediastinal drain. Stable positioning of bilateral  basilar chest tubes.    Postsurgical changes of bilateral lung transplant. Trachea is midline.  The cardiomediastinal silhouette is stably enlarged. Asymmetric  elevation of the right hemidiaphragm. No appreciable pneumothorax.  Small left pleural effusion. Bibasilar atelectasis. Unchanged  interstitial opacities.    Partially visualized upper abdomen is unremarkable. No acute osseous  abnormality. Soft tissue is within normal limits.         Impression    IMPRESSION:  1. Interval removal of pericardial drain. No appreciable pneumothorax.  Remainder of support devices are stable.  2. Postsurgical changes of bilateral lung transplant. Small left  pleural effusion. Bibasilar atelectasis. Decreased interstitial  opacities.    I have personally reviewed the examination and initial interpretation  and I agree with the findings.    ARIES DELGADO MD         SYSTEM ID:  [id number]   XR Chest Port 1 View    Narrative    EXAM: XR CHEST PORT 1 VIEW  10/28/2021 1:00 AM     HISTORY:  s/p lung transplant       COMPARISON:  10/27/2021    TECHNIQUE: Portable AP radiograph of the chest.    FINDINGS:   Endotracheal tube tip projects over the mid-low trachea. Enteric tube  and feeding tube course through the stomach and below the field of  view. Stable bilateral pleural chest tubes.     Postsurgical changes of bilateral lung transplant. The trachea is  midline. Stable heart normal heart size.  Unchanged streaky perihilar  and bibasilar opacities. Mild pulmonary vascular congestion is  present. There is no evidence of associated pulmonary edema. Small  left pleural effusion. No pneumothorax.     There are no osseous abnormalities. No acute abnormality in the  visualized upper abdomen.      Impression    IMPRESSION: In this patient status post bilateral lung transplant:  1.  No significant interval change.  2. Stable small left pleural effusion and postoperative atelectasis.  3. Mild pulmonary vascular congestion.       I have personally reviewed the examination and initial interpretation  and I agree with the findings.    FITO PERALES MD         SYSTEM ID:  [id number]   XR Chest Port 1 View    Narrative    Exam: XR CHEST PORT 1 VIEW, 10/28/2021 3:04 PM    Indication: chest tubes removed    Comparison: Same-day radiograph from 0:48 AM    Findings:   Single portable AP view of the chest. Endotracheal tube 4.5 cm from  the shiv. 2 enteric tubes with the tip below the field of view.  Clamshell sternotomy sutures. Skin staples transverse in the lower  chest. Mediastinal and lower right chest surgical clips. Interval  removal of bilateral chest tubes.    Midline trachea. No pneumothorax. Small left and trace right pleural  effusions. Bibasilar atelectasis. Mild pulmonary congestion.  Unremarkable cardiac silhouette and mediastinum. No acute osseous  abnormalities.      Impression    Impression:   1. Removal of bilateral chest tubes without pneumothorax.  2. Small left and trace right pleural effusions.  3. Mild pulmonary vascular congestion.    I have personally reviewed the examination and initial interpretation  and I agree with the findings.    DAVIN ANGUIANO MD         SYSTEM ID:  [id number]     10/28, 10/27 CXRs:  Removal of bilateral chest tubes without pneumothorax.  Small left and trace right pleural effusions and postoperative atelectasis.  Mild pulmonary vascular congestion.   10/26 Brain MRI:  1. Evolving acute age multifocal acute infarctions in both cerebral hemispheres and left cerebellum. No new areas of infarction when compared with prior MRI brain on 10/23/2021.  2. Minimally increased punctate regions of susceptibility effect within areas of infarction, corresponding to petechial hemorrhage within previously identified multifocal acute infarctions.  3. Head MRA demonstrates  no definite aneurysm or stenosis of the major intracranial arteries.  4. Neck MRA demonstrates patent major cervical arteries.   10/26 CXR:  Improved left pleural effusion and associated lower lobe opacity.  Stable small right pleural effusion.  No new focal airspace opacities.  Stable support devices.  10/26 Head CT:  This exam is significantly limited by motion and streak artifact.  MRI brain 10/23/2021 demonstrated multiple acute/subacute infarcts throughout the bilateral cerebral hemispheres and left cerebellum. Some of these acute/subacute infarcts demonstrated microhemorrhage on SWI.  Left posterior parietal lobe acute/subacute infarct is identified on current exam. Remaining previously seen infarcts are not identified possibly obscured by artifact. It is possible that a component of the abnormal signal on MRI may of been attributed to posterior reversible encephalopathy syndrome in addition to acute/subacute infarcts.  No CT evidence for a new cortical hypoattenuation to indicate a new acute infarct on current exam.  No definite acute intracranial hemorrhage given artifact.  No midline shift.  10/25 ABX:  Uncomplicated feeding tube placement with tip in the third portion of the duodenum.  10/25 Head / neck CTA:  HEAD CTA:  1.  No intracranial arterial large vessel occlusion.  2.  No significant stenosis/occlusion.  NECK CTA:  1.  Right vertebral artery mild stenosis.  2.  Left vertebral artery origin severe high-grade stenosis.  3.  Otherwise, no significant stenosis/occlusion. No dissection.  10/25 PM CXR:  1. Interval removal of apically directed chest tubes. No appreciable pneumothorax.  2. Cardiomegaly with persistent bilateral interstitial opacities suggestive for pulmonary edema versus atelectasis.  3. Small left pleural effusion with increased left basilar atelectasis versus consolidation.  4. Additional lines and tubes in stable position.   10/25 Chest / abdm CT:  1. Decreased (versus 10/22/21 CT  scan) consolidative opacities in the posterolateral left upper lobe and bilateral lung bases, which may represent improving infection and/or atelectasis.  2. Nonocclusive venous thrombosis of the low right internal jugular vein.  3. Decreased trace right hydropneumothorax and small left pleural effusion.  4. Support devices are in similar position, including the right inferior chest tube tip near the 8th-9th intercostal space, not definitively in the pleural space.  5. Decreased anterior chest subcutaneous/intramuscular emphysema.  6. The remainder of the exam is not significantly changed since 10/22/2021.  10/25 CXR:  1. No significant interval change and stable support devices.  2. Small left pleural effusion. Bibasilar atelectasis. Stable bilateral interstitial and airspace opacities.  3. No pneumothorax.  10/24 CXR:  1.  Stable postsurgical bilateral lung transplant changes with mild  perihilar interstitial opacities suggestive for pulmonary edema versus atelectasis.  2.  Support devices in stable position. No appreciable pneumothorax.   10/23 TTE:  No evidence of endocarditis. Normal biventricular function.  No valvular disease.  10/23 Brain MRI:  Multifocal subacute infarcts within both cerebral hemispheres and left cerebellum all of which appear present on prior head CT 10/22/2021.  Minimal petechial hemorrhage associated with the infarct in the left occipital lobe.  10/23 ABXs:  Gastric tube tip and sidehole projected over the stomach.  Nonobstructive bowel gas pattern.  10/23 TTE:  Valves cannot be assessed in this echo due to bandages on the chest.   10/23 CXR:  1. Postsurgical changes bilateral lung transplant.  2. Stable support devices.  3. Small left pleural effusion. Bibasilar atelectasis. Stable bilateral interstitial and airspace opacities.  4. No appreciable pneumothorax.  10/22 Chest / abdm CT angiogram:  1. No acute pulmonary embolus.  2. Surgical changes of bilateral lung transplantation are  stable compared to exam performed earlier today. Similar appearance of opacities throughout both lungs, likely representing atelectasis and/or consolidation.  3. No acute abnormality in the abdomen or pelvis.     10/22 Chest CT:  1. Postoperative changes of bilateral lung transplantation with supportive devices in stable positioning.  2. Multifocal consolidative opacities involving the left upper, left lower, and right lower lobes with scattered mucous plugging.  Concerning for aspiration or infection.  3. Multifocal diffuse groundglass opacities with mild interlobular septal thickening, compatible with pulmonary edema.  4. Extensive subcutaneous emphysema of the anterior chest wall and right neck.

## 2021-10-28 NOTE — PROGRESS NOTES
Pulmonary Medicine  Cystic Fibrosis - Lung Transplant Team  Progress Note  10/28/2021       Patient: Edson Thornton  MRN: [id number]  : 1965 (age 56 year old)  Transplant: 10/16/2021 (Lung), POD#12  Admission date: 2021    Assessment & Plan:     Edson Thornton is a 56 year old male with a PMH significant for NSIP/ILD, bronchiectasis, moderate PH, RA, SALTY, chronic HSV infection, hypogammaglobulinemia, steroid-induced diabetes, hypothyroidism, PFO, HTN, HLD, duodenal anomaly, anxiety, and depression.  Admitted on 21 from OSH for acute on chronic respiratory failure 2/2 ILD exacerbation, now s/p BSLT on 10/16/21.  Surgery relatively uncomplicated but pt. with low cardiac index and PGD immediately post-op.  Caroline weaned off 10/22, remains intubated.  Post-op course complicated by encephalopathy and diffuse weakness, acute to subacute CVA, afib with RVR, BRENNAN, GI bleed due to NJ/OG trauma, Candidemia, and recurring fevers.  Neuro status remains tenuous.  New bloody drainage from right ear this morning.     Today's recommendations:   - Encourage consistent administration of CPT as ordered (did not receive at bedtime therapy)  - Plan for trach and PEG/J tube with thoracic surgery scheduled tomorrow at 0730  - One chest tube minimum to remain for now given empyema  - Tacrolimus level today to trend, will adjust prn, daily levels to trend  - Prednisone taper ordered 10/31  - CMV and EBV   - Coccidioides Ag   - LP given recurrent fevers, may be beneficial to coordinate timing with heparin hold for procedure tomorrow  - Low threshold for empiric ABX coverage  - Repeat IgG   - DSA repeat ordered   - Donor risk labs 11/15  - Primary team to pursue workup of new bloody drainage from ear     S/p BSLT for ILD:  Acute hypoxic respiratory failure:   Pulmonary edema:  Subcutaneous emphysema: Unfortunately had not received vaccination for flu, PNA, or COVID-19 PTA.  Explant pathology with NSIP, no  malignancy.  PGD 2-3.  Weaned off paralytic 10/19 (for vent dyssynchrony), pressors 10/21 (now hypertensive), and Caroline 10/22.  Initial difficulty weaning sedation given agitation then with neurological findings as below.  CXR initially post-op with pulmonary edema, aggressively diuresed.  Recurring fevers post-op, see ID section below.  Bronch 10/20 with tan secretions to LLL, repeat 10/23 with frothy clear secretions in right TB tree.  Chest CT (10/25) with interval improvement in consolidative opacities to BLL and KARI with trace right hydroPTX (personally reviewed).  Bronch (10/26) with minimal sticky secretions, anastomosis intact.  Remains intubated and with concern for CVA as below.  - Nebs: levalbuterol and Mucomyst QID  - Aggressive pulmonary toilet with chest physiotherapy QID  - Ventilator management and PST per ICU team  - Plan for trach and PEG/J tube with thoracic surgery (scheduled for tomorrow at 0730), tube feeds via NJ for now  - Chest tube management per CVTS, one chest tube minimum to remain for now given empyema  - Agree with ongoing diuresis as tolerated  - CXR today with stable left pleural effusion and stable small right pleural effusion (personally reviewed)     Immunosuppression: s/p induction therapy with basiliximab 10/16 (and high dose IV steroid) and 10/20  - Tacrolimus 1.5 mg BID suspension (decreased 10/26 with fluconazole, recently held 10/23-10/25 due to supratherapeutic levels).  Goal level 8-12.  Level today still pending, will adjust prn, continue daily levels.  - MMF 1500 mg BID (on PTA, AZA to be avoided given TPMT)  - Prednisone 15 mg BID with next taper on 10/31 (ordered) per lung transplant protocol:  Date AM dose (mg) PM dose (mg)   10/24/21 15 15   10/31/21 15 12.5   11/7/21 12.5 12.5   11/21/21 12.5 10   12/5/21 10 10   1/2/22 10 7.5   1/30/22 7.5 7.5   2/27/22 7.5 5   3/27/22 5 5   4/24/22 5 2.5      Prophylaxis:   - Bactrim for PJP ppx (10/25, held prior d/t BRENNAN)  -  Nystatin for oral candidiasis ppx, 6 month course  - See below for serologies and viral ppx:    Donor Recipient Intervention   CMV status Negative Negative None, CMV monthly (ordered 11/16)   EBV status Positive Positive None, EBV monthly (ordered 11/16)   HSV status N/A Positive Acyclovir POD #1-30   (recent infection history)      ID: Concern for possible Strongyloides exposure pre-transplant s/p ivermectin x1 dose (9/17).  Donor and recipient cultures NGTD.  S/p IV ceftazidime/vancomycin for 48h per protocol and additional empiric ceftazidime 10/19-10/23 given recurrent fevers.  Bronch cultures 10/17 with Staph epi.  Cryptococcal Ag negative 10/23.    - Bronch cultures (10/26) NGTD  - Monthly Coccidioides Ag x12 months post-transplant per ID (urine and serum negative 10/17), due 11/17 (ordered)     Recurring fevers:  Fevers post-op, Tmax 101.7 POD #1.  Febrile with worsening leukocytosis again 10/25.  - Trach sputum culture (10/25) with GPC, following  - Pleural cultures (10/25) with 3+ WBC, following  - Recommend LP given recurrent fevers, may be beneficial to coordinate timing with heparin hold for procedure tomorrow  - Low threshold for empiric ABX      Disseminated Candida:  Noted on 1 of 2 blood cultures 10/20 and 10/22.  BDG fungitell positive (399) on 10/20.  Respiratory cultures with persistent Candida albicans (10/17, 10/20, 10/22).  TC 10/23 without evidence of endocarditis.   Ophthalmology consult 10/24 with benign dilated fundoscopic exam.  Candida empyema also noted 10/25.  - Repeat blood cultures (10/23, 10/25) NGTD  - Fluconazole (10/26, prior micafungin 10/22-10/27), duration TBD pending clinical course     HSV: Chronic intermittent active infection pre-transplant with recent HSV infection: crusted lesions throughout left side of jaw, s/p 10 day treatment course of ACV through 10/9.  HSV PCR blood negative 10/17.  - ACV ppx as above (started POD #1 instead of POD #8 given HSV history and  location)     Hypogammaglobulinemia: IgG previously low at 364 (9/7).  Noted at 265 at time of transplant, s/p IVIG with premedication 10/21.    - Repeat IgG 11/17 (ordered)     Positive cross match: Note that he received two doses of rituximab in June, which is likely contributing to cross match result.  DSA negative 10/17, repeat DSA 10/23 invalid with recent IVIG (as above).  - Repeat DSA 11/1 (ordered), monitoring q2 weeks     PHS risk criteria donor:  Additional labs required post-transplant (between 4-8 weeks post-op): Hepatitis B, Hepatitis C, and HIV by VISHAL (ordered 11/15).     SALTY: Noted pre-transplant.  Home CPAP 6-12 cm H2O.  - Resume BiPAP at night post-extubation     Other relevant problems being managed by primary team:     Acute to subacute CVA:   Encephalopathy and diffuse weakness: Stroke code 10/22 d/t limited movement of BLE, CT head with infarcts in the bilateral cerebral hemispheres and left cerebella hemisphere (presumed embolic), no acute intracranial hemorrhage.  MRI (10/23) with multifocal subacute infarcts within both cerebral hemispheres and left cerebellum.  EEG without seizures 10/22, ammonia normal.  DDx include surgery v embolic v infectious (see above regarding ID work up).  Heparin drip started 10/23.   Repeat stroke code 10/25 with marked decrease in responsiveness with sedation wean.  Pupils not equal (3 mm R, 2 mm L with extropia) and gag reflex initially absent.  CTA head without obvious new pathology, MRI brain (with and without contrast) primarily revealing for infarct, low likelihood of PRES.  - Heparin management per neurology and primary  - ID workup and management as above  - VEEG per neuro (preliminary: severe diffuse encephalopathy)     Afib with RVR: Noted 10/18, started on amiodarone drip and transitioned to PO 10/21.  Currently in SR.  Heparin drip started 10/23 as above.     BRENNAN, Improving: Baseline creatinine 0.7.  BRENNAN noted POD #1, UO also downtrending.  Improving  "with aggressive diuresis.     Elevated LFTs, Improving: Shock liver post-op.  Initial ALT//230 evening of surgery, then with marked increase to 1550/1246 POD #1.  Gradual improvement in ALT/AST, alk phos with persistent mild elevation.     GI bleed, Resolved: Attributed to mechanical trauma.  Hemoglobin dropped to 6.6 10/22, s/p 2 units pRBC.  OG tube with bloody output, EGD 10/23 noted NJ/OG tube trauma with scant oozing.  IV PPI BID.  Hemoglobin now stable.     We appreciate the excellent care provided by the CVICU and CVTS teams.  Recommendations communicated via in person rounding and this note.  Will continue to follow along closely, please do not hesitate to call with any questions or concerns.     Patient discussed with Dr. Pereira.    Renetta Holloway, DNP, APRN, CNP  Inpatient Nurse Practitioner  Pulmonary CF/Transplant     Subjective & Interval History:     Remains intubated without sedation but no improvement in responsiveness or mentation.  Did PS for 6 hours yesterday.  Still overbreathing the vent, improved some from RR 32 yesterday to now 26-28.  Small amount of thick sputum via ETT, cloudy/tanya.  Leaking noted at chest tube sites.  TF at goal via NJ tube.    Review of Systems:     ROS as above, otherwise severely limited by intubation and encephalopathy.    Physical Exam:     Vital signs:  Temp: 99.1  F (37.3  C) Temp src: Bladder BP: 115/73 Pulse: 73   Resp: 26 SpO2: 98 % O2 Device: Mechanical Ventilator Oxygen Delivery: 15 LPM Height: 162.6 cm (5' 4\") Weight: 68.6 kg (151 lb 3.8 oz)  I/O:     Intake/Output Summary (Last 24 hours) at 10/28/2021 0729  Last data filed at 10/28/2021 0700  Gross per 24 hour   Intake 3829.11 ml   Output 3255 ml   Net 574.11 ml     Constitutional: Lying supine, in mild distress.   HEENT: Eyes with pink conjunctivae, anicteric.  Pupils 4 mm and responsive equally, slightly sluggish response to light, no exotropia.  Orally intubated.  PULM: Diminished bases bilaterally. "  Minimal crackles, no rhonchi, no wheezes.  Very mildly labored breathing on CMV 18/400/5/60 (overbreathing at rate of 28).  CV: Normal S1 and S2.  RRR.  No murmur, gallop, or rub.  Trace BUE edema.   ABD: NABS, soft, nontender, nondistended.  Rectal tube in place with brown output.  MSK: + muscle wasting.   NEURO: Not responsive to painful stimuli.   SKIN: Warm, dry.  No rash on limited exam.   PSYCH: Mood calm.     Lines, Drains, and Devices:  Peripheral IV 10/22/21 Left;Posterior Lower forearm (Active)   Site Assessment WDL 10/28/21 0400   Line Status Saline locked;Checked every 1-2 hour 10/28/21 0400   Dressing Intervention New dressing  10/27/21 2000   Phlebitis Scale 0-->no symptoms 10/28/21 0400   Infiltration Scale 0 10/28/21 0400   Number of days: 6       Peripheral IV 10/25/21 Right;Anterior;Lateral Lower forearm (Active)   Site Assessment WDL 10/28/21 0400   Line Status Infusing;Checked every 1-2 hour 10/28/21 0400   Dressing Intervention New dressing  10/27/21 2000   Phlebitis Scale 0-->no symptoms 10/28/21 0400   Infiltration Scale 0 10/28/21 0400   Infiltration Site Treatment Method  None 10/27/21 1600   If infiltrated, was a vesicant infusing? No 10/27/21 1600   Number of days: 3       Peripheral IV 10/25/21 Anterior;Distal;Right Upper arm (Active)   Site Assessment WDL 10/28/21 0400   Line Status Infusing;Checked every 1-2 hour 10/28/21 0400   Dressing Intervention Dressing reinforced 10/27/21 1600   Phlebitis Scale 0-->no symptoms 10/28/21 0400   Infiltration Scale 0 10/28/21 0400   Infiltration Site Treatment Method  None 10/27/21 0400   Number of days: 3     Data:     LABS    CMP:   Recent Labs   Lab 10/28/21  0530 10/28/21  0524 10/28/21  0402 10/28/21  0311 10/27/21  2220 10/27/21  2130 10/27/21  1806 10/27/21  1635 10/27/21  0621 10/27/21  0523 10/27/21  0017 10/26/21  2338 10/26/21  1717 10/26/21  1452 10/26/21  0931 10/26/21  0637 10/26/21  0145 10/25/21  2113     --   --   --   --   147*  --  148*  --  152*   < > 150*   < > 151*   < > 148*   < > 146*   POTASSIUM 5.0  --   --   --   --   --   --  5.2  --  4.4  --   --   --  5.0   < > 4.1   < > 4.7   CHLORIDE 110*  --   --   --   --   --   --   --   --  120*  --   --   --  121*  --  117*   < > 116*   CO2 30  --   --   --   --   --   --   --   --  27  --   --   --  25  --  25   < > 25   ANIONGAP 3  --   --   --   --   --   --   --   --  5  --   --   --  5  --  6   < > 5   * 164* 164* 171*   < >  --    < >  --    < > 208*   < >  --    < > 244*   < > 206*   < > 192*   BUN 60*  --   --   --   --   --   --   --   --  60*  --   --   --  63*  --  62*   < > 62*   CR 1.04  --   --   --   --   --   --   --   --  0.90  --   --   --  1.16  --  1.11   < > 1.26*   GFRESTIMATED 80  --   --   --   --   --   --   --   --  >90  --   --   --  70  --  74   < > 63   ERIK 9.0  --   --   --   --   --   --   --   --  9.1  --   --   --  8.6  --  8.9   < > 8.6   MAG 1.8  --   --   --   --   --   --   --   --  2.0  --   --   --  1.8  --  1.9  --   --    PHOS 4.2  --   --   --   --   --   --  3.2  --  2.6  --  3.2   < > 3.6   < > 3.0  --   --    PROTTOTAL 5.9*  --   --   --   --   --   --   --   --  6.2*  --   --   --   --   --  5.8*  --  5.6*   ALBUMIN 1.7*  --   --   --   --   --   --   --   --  1.8*  --   --   --   --   --  1.8*  --  1.6*   BILITOTAL 0.7  --   --   --   --   --   --   --   --  0.3  --   --   --   --   --  0.3  --  0.3   ALKPHOS 130  --   --   --   --   --   --   --   --  160*  --   --   --   --   --  160*  --  157*   AST 21  --   --   --   --   --   --   --   --  27  --   --   --   --   --  21  --  21   ALT 47  --   --   --   --   --   --   --   --  71*  --   --   --   --   --  71*  --  84*    < > = values in this interval not displayed.     CBC:   Recent Labs   Lab 10/28/21  0530 10/27/21  0523 10/26/21  1020 10/25/21  0326   WBC 41.0* 37.3* 35.8* 32.8*   RBC 3.38* 3.58* 3.28* 3.53*   HGB 10.3* 10.8* 9.9* 10.6*   HCT 33.2* 35.3* 32.0* 33.1*   MCV  98 99 98 94   MCH 30.5 30.2 30.2 30.0   MCHC 31.0* 30.6* 30.9* 32.0   RDW 15.9* 16.0* 16.2* 15.9*    273 341 407       INR:   Recent Labs   Lab 10/28/21  0530 10/27/21  0523 10/26/21  0637 10/25/21  1609   INR 1.13 1.16* 1.26* 1.27*       Glucose:   Recent Labs   Lab 10/28/21  0530 10/28/21  0524 10/28/21  0402 10/28/21  0311 10/28/21  0210 10/28/21  0113   * 164* 164* 171* 164* 164*       Blood Gas:   Recent Labs   Lab 10/28/21  0502 10/27/21  0335 10/26/21  0522 10/25/21  2113 10/25/21  2113 10/24/21  0010 10/23/21  2108 10/23/21  2016 10/23/21  1543   PHV  --   --   --   --  7.47*  --  7.42  --  7.44*   PCO2V  --   --   --   --  36*  --  45  --  46   PO2V  --   --   --   --  78*  --  36  --  36   HCO3V  --   --   --   --  27  --  29*  --  31*   LORI  --   --   --   --  2.9*  --  4.1*  --  6.5*   O2PER 50 60 55   < > 55   < > 50   < > 50  50    < > = values in this interval not displayed.       Culture Data No results for input(s): CULT in the last 168 hours.    Virology Data:   Lab Results   Component Value Date    FLUAH1 Negative 10/17/2021    FLUAH3 Negative 10/17/2021    [id number] Negative 10/17/2021    IFLUB Negative 10/17/2021    RSVA Negative 10/17/2021    RSVB Negative 10/17/2021    PIV1 Negative 10/17/2021    PIV2 Negative 10/17/2021    PIV3 Negative 10/17/2021    HMPV Negative 10/17/2021    HRVS Negative 10/17/2021    ADVBE Negative 10/17/2021    ADVC Negative 10/17/2021       Historical CMV results (last 3 of prior testing):  Lab Results   Component Value Date    CMVQNT Not Detected 10/26/2021     No results found for: CMVLOG    Urine Studies    Recent Labs   Lab Test 10/25/21  1507 10/22/21  1641   URINEPH 5.5 7.5*   NITRITE Negative Negative   LEUKEST Negative Negative   WBCU 2 3       Most Recent [facility] Pulmonary Function Testing (FVC/FEV1 only)  No results found for: 20002  No results found for: 20003  No results found for: 20015  No results found for: 20016    IMAGING    Recent Results  (from the past 48 hour(s))   MR Brain for Stroke [facility] w/o & w Contras    Addendum: 10/27/2021    As per request this addendum is made. The question was if these  changes were due to PRES.     The scattered cortical areas of signal changes show expected evolution  of infarction on MRI. The restricted diffusion is still persistent  which can be seen up to 10 days in case of infarction and there is now  increased areas of enhancement of the presumed infarcted areas which  is also expected for aging infarction. The findings are less likely  due to PRES as the findings have not reversed and as the FLAIR signal  changes are all associated with restricted diffusion which is a rare  finding to be seen in case of PRES.   Continued follow up with MRI is recommended to further visualize the  evolution of the findings.     YAN PRABHAKAR MD         SYSTEM ID:  [id number]      Narrative    MRI brain without and with contrast  MRA of the head without contrast  Neck MRA without and with contrast    Provided History:  follow up for stroke. concern for microhemorrhages.    Comparison:  MR brain 10/23/21    Technique:   Brain MRI:  Axial diffusion, FLAIR, T2-weighted, susceptibility, and  coronal T1-weighted images were obtained without intravenous contrast.  Following intravenous gadolinium-based contrast administration, axial  and coronal T1-weighted images were obtained.    Head MRA: 3D time-of-flight MRA of the [place] of [place] was performed  without intravenous contrast.  Neck MRA:  Limited non contrast 2DTOF images were obtained of the  mid-cervical region. Following intravenous gadolinium-based contrast  administration, a contrast enhanced MRA of the neck/cervical vessels  was performed.  Three-dimensional reconstructions of the neck and head MRA were  created, which were reviewed by the radiologist.    Dose: 7 mL gadavist    Findings:   Brain MRI: Evolving infarction within the left occipital lobe, right  occipital lobe,  bilateral frontal and parietal lobes, and left  cerebellum without significant change compared to prior. These areas  continue to demonstrate restricted diffusion and increased T2 signal  on FLAIR. There continues to be T1 hyperintensity within the areas of  infarct, most prominent in the left parieto-occipital lobe. No new  evidence of restricted diffusion seen on today's examination. No  evidence of significant mass effect.    On the FLAIR images, there is nonspecific scattered periventricular T2  hyperintensities without associated diffusion restriction, which are  most likely related to chronic small vessel ischemic disease.     On susceptibility images, the scattered punctate regions of  susceptibility effect corresponding to areas of restricted diffusion  are minimally increased compared to prior, most prominent in the  parieto-occipital lobes suggesting microhemorrhages.     Ventricles are proportional to cerebral sulci. No midline shift. No  abnormal extra axial fluid collection identified. Paranasal sinus  mucosal thickening is again seen. Bilateral mastoid effusions.     Head MRA demonstrates no definite aneurysm or stenosis of the major  intracranial arteries.     Neck MRA demonstrates patent major cervical arteries. Antegrade flow  in the major cervical vasculature.      Impression    Impression:  1. Evolving acute age multifocal acute infarctions in both cerebral  hemispheres and left cerebellum. No new areas of infarction when  compared with prior MRI brain on 10/23/2021.  2. Minimally increased punctate regions of susceptibility effect  within areas of infarction, corresponding to petechial hemorrhage  within previously identified multifocal acute infarctions.  3. Head MRA demonstrates no definite aneurysm or stenosis of the major  intracranial arteries.  4. Neck MRA demonstrates patent major cervical arteries.    I have personally reviewed the examination and initial interpretation  and I agree with  the findings.    YAN PRABHAKAR MD         SYSTEM ID:  [id number]   XR Chest Port 1 View    Narrative    EXAM: XR CHEST PORT 1 VIEW  10/27/2021 1:31 AM     HISTORY:  s/p lung transplant       COMPARISON:  10/26/2021    TECHNIQUE: Portable AP radiograph of the chest.    FINDINGS:   Endotracheal tube tip projects over the mid to low thoracic trachea  approximately 2.4 cm above the shiv. Feeding tube and enteric tube  course below the diaphragm and beyond the field-of-view. Stable  mediastinal and bilateral pleural chest tubes. Intact median  sternotomy wires.    The trachea is midline. Stable heart size with slightly more distinct  margins. Slightly improved perihilar and basilar opacities. No large  pleural effusion. No appreciable pneumothorax.      Impression    IMPRESSION:   1. Slightly improved perihilar and basilar opacities, likely  postoperative atelectasis.  2. Endotracheal tube is in borderline low position. Consider slight  retraction.  3. Additional support devices are stable.    I have personally reviewed the examination and initial interpretation  and I agree with the findings.    MAURICIO NAVAS MD         SYSTEM ID:  [id number]   XR Chest Port 1 View    Narrative    EXAMINATION: XR CHEST PORT 1 VIEW, 10/27/2021 5:00 PM    INDICATION: Chest tube removal    COMPARISON: Chest x-ray 10/27/2021 at 12:55 AM.    FINDINGS: Single portable 40 degree upright AP radiograph of the  chest. ET tube projects at the mid thoracic trachea. 2 enteric tubes  course below the left hemidiaphragm, tips are now within view.  Interval removal of mediastinal drain. Stable positioning of bilateral  basilar chest tubes.    Postsurgical changes of bilateral lung transplant. Trachea is midline.  The cardiomediastinal silhouette is stably enlarged. Asymmetric  elevation of the right hemidiaphragm. No appreciable pneumothorax.  Small left pleural effusion. Bibasilar atelectasis. Unchanged  interstitial opacities.    Partially visualized  upper abdomen is unremarkable. No acute osseous  abnormality. Soft tissue is within normal limits.         Impression    IMPRESSION:  1. Interval removal of pericardial drain. No appreciable pneumothorax.  Remainder of support devices are stable.  2. Postsurgical changes of bilateral lung transplant. Small left  pleural effusion. Bibasilar atelectasis. Decreased interstitial  opacities.    I have personally reviewed the examination and initial interpretation  and I agree with the findings.    ARIES DELGADO MD         SYSTEM ID:  [id number]   XR Chest Port 1 View    Impression    RESIDENT PRELIMINARY INTERPRETATION  IMPRESSION: In this patient status post bilateral lung transplant:  1. No significant interval change.  2. Stable small left pleural effusion and postoperative atelectasis.  3. Mild pulmonary vascular congestion.

## 2021-10-28 NOTE — PROGRESS NOTES
CLINICAL NUTRITION SERVICES - BRIEF NOTE   (see RD note on 10/26 for full assessment)    Obtained metabolic cart study on 10/28 @ 0915. MREE = 2432 kcal/day (equiv to 38 kcal/kg) with RQ = 0.84. In the 24 hours preceding the study, pt received 2240 kcals (equiv to 35 kcal/kg or 92% MREE). RQ is within physiologic range and logical given provisions received prior to study. Based on study results, will aim to meet % of MREE, or 1950 - 2450 kcal/day (30 - 38 kcal/kg).     Given K+ up-trending, will change to renal formula.     Nutrition changes today:  Run Novasource Renal @ 45 ml/hr (1080 ml) + Prosource (1 pkt TID) provides 2280 kcal (36 kcal/kg), 98 g pro (2 g/kg), 198 g CHO, 774 ml free water, and 0 g fiber daily.     Sona Amezquita, RD, LD  [id number]  Pgr: [phone]

## 2021-10-28 NOTE — PLAN OF CARE
Neuro: Pt remains unresponsive to painful/noxious stimuli. Pupils are equal and reactive. Max temperature 100.8.     Cardiac: SR. HR 70s-90s. MAPS>65, SBP<140.     Respiratory: On CMV settings (RR 18, Peep 5, ) at 50%. RR 25-35. Lung sounds coarse/diminished. Small amount of cloudy/tanya, thick secretions present in ETT.    GI/: OG to LIS. NJ infusing with tube feeding. Urine output 60-100ml/hr. Rectal pouch in place with loose stool present. Insulin gtt infusing.     Incisons: Chest incision CDI/KIM. R chest tube with leaking at site and 0-5ml/lhr of yellow/serosanguinous output present. L chest tube with leaking at site and 0-1ml/hr of serosanguinous output present.     Lines: PIVx3. Heparin gtt infusing.     No family called RN overnight.     Plan: Continue to monitor and update MD as needed. Continue plan of care.       Problem: Adult Inpatient Plan of Care  Goal: Plan of Care Review  Outcome: No Change

## 2021-10-29 ENCOUNTER — APPOINTMENT (OUTPATIENT)
Dept: GENERAL RADIOLOGY | Facility: CLINIC | Age: 56
End: 2021-10-29
Attending: STUDENT IN AN ORGANIZED HEALTH CARE EDUCATION/TRAINING PROGRAM
Payer: COMMERCIAL

## 2021-10-29 ENCOUNTER — ANESTHESIA (OUTPATIENT)
Dept: SURGERY | Facility: CLINIC | Age: 56
End: 2021-10-29
Payer: COMMERCIAL

## 2021-10-29 ENCOUNTER — APPOINTMENT (OUTPATIENT)
Dept: GENERAL RADIOLOGY | Facility: CLINIC | Age: 56
End: 2021-10-29
Attending: THORACIC SURGERY (CARDIOTHORACIC VASCULAR SURGERY)
Payer: COMMERCIAL

## 2021-10-29 LAB
ALBUMIN SERPL-MCNC: 1 G/DL (ref 3.4–5)
ALP SERPL-CCNC: 106 U/L (ref 40–150)
ALT SERPL W P-5'-P-CCNC: 35 U/L (ref 0–70)
ANION GAP SERPL CALCULATED.3IONS-SCNC: 6 MMOL/L (ref 3–14)
APPEARANCE CSF: ABNORMAL
AST SERPL W P-5'-P-CCNC: 20 U/L (ref 0–45)
BACTERIA SPT CULT: NO GROWTH
BASE EXCESS BLDA CALC-SCNC: 7.9 MMOL/L (ref -9–1.8)
BASOPHILS # BLD AUTO: 0.1 10E3/UL (ref 0–0.2)
BASOPHILS NFR BLD AUTO: 0 %
BILIRUB SERPL-MCNC: 0.3 MG/DL (ref 0.2–1.3)
BUN SERPL-MCNC: 72 MG/DL (ref 7–30)
C GATTII+NEOFOR DNA CSF QL NAA+NON-PROBE: NEGATIVE
CALCIUM SERPL-MCNC: 9 MG/DL (ref 8.5–10.1)
CHLORIDE BLD-SCNC: 107 MMOL/L (ref 94–109)
CMV DNA CSF QL NAA+NON-PROBE: NEGATIVE
CO2 SERPL-SCNC: 26 MMOL/L (ref 20–32)
COLOR CSF: ABNORMAL
CREAT SERPL-MCNC: 1.23 MG/DL (ref 0.66–1.25)
CRP SERPL-MCNC: 53 MG/L (ref 0–8)
CRYPTOC AG SPEC QL: NEGATIVE
E COLI K1 AG CSF QL: NEGATIVE
EOSINOPHIL # BLD AUTO: 0 10E3/UL (ref 0–0.7)
EOSINOPHIL NFR BLD AUTO: 0 %
ERYTHROCYTE [DISTWIDTH] IN BLOOD BY AUTOMATED COUNT: 16 % (ref 10–15)
ERYTHROCYTE [SEDIMENTATION RATE] IN BLOOD BY WESTERGREN METHOD: 87 MM/HR (ref 0–20)
EV RNA SPEC QL NAA+PROBE: NEGATIVE
GFR SERPL CREATININE-BSD FRML MDRD: 65 ML/MIN/1.73M2
GLUCOSE BLD-MCNC: 128 MG/DL (ref 70–99)
GLUCOSE BLDC GLUCOMTR-MCNC: 123 MG/DL (ref 70–99)
GLUCOSE BLDC GLUCOMTR-MCNC: 133 MG/DL (ref 70–99)
GLUCOSE BLDC GLUCOMTR-MCNC: 135 MG/DL (ref 70–99)
GLUCOSE BLDC GLUCOMTR-MCNC: 136 MG/DL (ref 70–99)
GLUCOSE BLDC GLUCOMTR-MCNC: 137 MG/DL (ref 70–99)
GLUCOSE BLDC GLUCOMTR-MCNC: 144 MG/DL (ref 70–99)
GLUCOSE BLDC GLUCOMTR-MCNC: 145 MG/DL (ref 70–99)
GLUCOSE BLDC GLUCOMTR-MCNC: 158 MG/DL (ref 70–99)
GLUCOSE BLDC GLUCOMTR-MCNC: 161 MG/DL (ref 70–99)
GLUCOSE BLDC GLUCOMTR-MCNC: 192 MG/DL (ref 70–99)
GLUCOSE BLDC GLUCOMTR-MCNC: 221 MG/DL (ref 70–99)
GLUCOSE BLDC GLUCOMTR-MCNC: 225 MG/DL (ref 70–99)
GLUCOSE BLDC GLUCOMTR-MCNC: 239 MG/DL (ref 70–99)
GLUCOSE BLDC GLUCOMTR-MCNC: 257 MG/DL (ref 70–99)
GLUCOSE CSF-MCNC: 83 MG/DL (ref 40–70)
GP B STREP DNA CSF QL NAA+NON-PROBE: NEGATIVE
HAEM INFLU DNA CSF QL NAA+NON-PROBE: NEGATIVE
HCO3 BLD-SCNC: 31 MMOL/L (ref 21–28)
HCT VFR BLD AUTO: 30.4 % (ref 40–53)
HGB BLD-MCNC: 9.4 G/DL (ref 13.3–17.7)
HHV6 DNA CSF QL NAA+NON-PROBE: NEGATIVE
HSV1 DNA CSF QL NAA+NON-PROBE: NEGATIVE
HSV2 DNA CSF QL NAA+NON-PROBE: NEGATIVE
IMM GRANULOCYTES # BLD: 1 10E3/UL
IMM GRANULOCYTES NFR BLD: 3 %
INR PPP: 1.13 (ref 0.85–1.15)
L MONOCYTOG DNA CSF QL NAA+NON-PROBE: NEGATIVE
LYMPH ABN NFR CSF MANUAL: 3 %
LYMPHOCYTES # BLD AUTO: 1.7 10E3/UL (ref 0.8–5.3)
LYMPHOCYTES NFR BLD AUTO: 4 %
MAGNESIUM SERPL-MCNC: 1.8 MG/DL (ref 1.6–2.3)
MCH RBC QN AUTO: 30.9 PG (ref 26.5–33)
MCHC RBC AUTO-ENTMCNC: 30.9 G/DL (ref 31.5–36.5)
MCV RBC AUTO: 100 FL (ref 78–100)
MONOCYTES # BLD AUTO: 1.6 10E3/UL (ref 0–1.3)
MONOCYTES NFR BLD AUTO: 4 %
MONOS+MACROS NFR CSF MANUAL: 4 %
N MEN DNA CSF QL NAA+NON-PROBE: NEGATIVE
NEUTROPHILS # BLD AUTO: 34.3 10E3/UL (ref 1.6–8.3)
NEUTROPHILS NFR BLD AUTO: 89 %
NEUTROPHILS NFR CSF MANUAL: 94 %
NRBC # BLD AUTO: 0 10E3/UL
NRBC BLD AUTO-RTO: 0 /100
O2/TOTAL GAS SETTING VFR VENT: 45 %
PARECHOVIRUS A RNA CSF QL NAA+NON-PROBE: NEGATIVE
PCO2 BLD: 37 MM HG (ref 35–45)
PH BLD: 7.53 [PH] (ref 7.35–7.45)
PHOSPHATE SERPL-MCNC: 4.9 MG/DL (ref 2.5–4.5)
PLATELET # BLD AUTO: 220 10E3/UL (ref 150–450)
PO2 BLD: 97 MM HG (ref 80–105)
POTASSIUM BLD-SCNC: 4.6 MMOL/L (ref 3.4–5.3)
PROCALCITONIN SERPL-MCNC: 0.38 NG/ML
PROT CSF-MCNC: 97 MG/DL (ref 15–60)
PROT SERPL-MCNC: 5.8 G/DL (ref 6.8–8.8)
RBC # BLD AUTO: 3.04 10E6/UL (ref 4.4–5.9)
RBC # CSF MANUAL: 9000 /UL (ref 0–2)
S PNEUM DNA CSF QL NAA+NON-PROBE: NEGATIVE
SODIUM SERPL-SCNC: 139 MMOL/L (ref 133–144)
T4 FREE SERPL-MCNC: 1.04 NG/DL (ref 0.76–1.46)
TACROLIMUS BLD-MCNC: 7.7 UG/L (ref 5–15)
TME LAST DOSE: NORMAL H
TME LAST DOSE: NORMAL H
TSH SERPL DL<=0.005 MIU/L-ACNC: 7.9 MU/L (ref 0.4–4)
TUBE # CSF: 4
UFH PPP CHRO-ACNC: 0.11 IU/ML
VZV DNA CSF QL NAA+NON-PROBE: NEGATIVE
WBC # BLD AUTO: 38.8 10E3/UL (ref 4–11)
WBC # CSF MANUAL: 80 /UL (ref 0–5)

## 2021-10-29 PROCEDURE — 009U3ZX DRAINAGE OF SPINAL CANAL, PERCUTANEOUS APPROACH, DIAGNOSTIC: ICD-10-PCS | Performed by: INTERNAL MEDICINE

## 2021-10-29 PROCEDURE — 82040 ASSAY OF SERUM ALBUMIN: CPT | Performed by: NURSE PRACTITIONER

## 2021-10-29 PROCEDURE — 82945 GLUCOSE OTHER FLUID: CPT | Performed by: STUDENT IN AN ORGANIZED HEALTH CARE EDUCATION/TRAINING PROGRAM

## 2021-10-29 PROCEDURE — 258N000003 HC RX IP 258 OP 636: Performed by: NURSE ANESTHETIST, CERTIFIED REGISTERED

## 2021-10-29 PROCEDURE — 250N000012 HC RX MED GY IP 250 OP 636 PS 637: Performed by: STUDENT IN AN ORGANIZED HEALTH CARE EDUCATION/TRAINING PROGRAM

## 2021-10-29 PROCEDURE — 87102 FUNGUS ISOLATION CULTURE: CPT | Performed by: STUDENT IN AN ORGANIZED HEALTH CARE EDUCATION/TRAINING PROGRAM

## 2021-10-29 PROCEDURE — 87116 MYCOBACTERIA CULTURE: CPT | Performed by: STUDENT IN AN ORGANIZED HEALTH CARE EDUCATION/TRAINING PROGRAM

## 2021-10-29 PROCEDURE — 250N000009 HC RX 250: Performed by: STUDENT IN AN ORGANIZED HEALTH CARE EDUCATION/TRAINING PROGRAM

## 2021-10-29 PROCEDURE — 999N000179 XR SURGERY CARM FLUORO LESS THAN 5 MIN W STILLS: Mod: TC

## 2021-10-29 PROCEDURE — 250N000013 HC RX MED GY IP 250 OP 250 PS 637: Performed by: STUDENT IN AN ORGANIZED HEALTH CARE EDUCATION/TRAINING PROGRAM

## 2021-10-29 PROCEDURE — 71045 X-RAY EXAM CHEST 1 VIEW: CPT

## 2021-10-29 PROCEDURE — 87205 SMEAR GRAM STAIN: CPT | Performed by: STUDENT IN AN ORGANIZED HEALTH CARE EDUCATION/TRAINING PROGRAM

## 2021-10-29 PROCEDURE — 250N000011 HC RX IP 250 OP 636: Performed by: NURSE ANESTHETIST, CERTIFIED REGISTERED

## 2021-10-29 PROCEDURE — 99291 CRITICAL CARE FIRST HOUR: CPT | Mod: 24 | Performed by: STUDENT IN AN ORGANIZED HEALTH CARE EDUCATION/TRAINING PROGRAM

## 2021-10-29 PROCEDURE — 87075 CULTR BACTERIA EXCEPT BLOOD: CPT | Performed by: STUDENT IN AN ORGANIZED HEALTH CARE EDUCATION/TRAINING PROGRAM

## 2021-10-29 PROCEDURE — 71045 X-RAY EXAM CHEST 1 VIEW: CPT | Mod: 26 | Performed by: RADIOLOGY

## 2021-10-29 PROCEDURE — 85652 RBC SED RATE AUTOMATED: CPT | Performed by: INTERNAL MEDICINE

## 2021-10-29 PROCEDURE — 200N000002 HC R&B ICU UMMC

## 2021-10-29 PROCEDURE — 82803 BLOOD GASES ANY COMBINATION: CPT

## 2021-10-29 PROCEDURE — 43246 EGD PLACE GASTROSTOMY TUBE: CPT | Mod: 78 | Performed by: THORACIC SURGERY (CARDIOTHORACIC VASCULAR SURGERY)

## 2021-10-29 PROCEDURE — 36415 COLL VENOUS BLD VENIPUNCTURE: CPT | Performed by: INTERNAL MEDICINE

## 2021-10-29 PROCEDURE — 87206 SMEAR FLUORESCENT/ACID STAI: CPT | Performed by: STUDENT IN AN ORGANIZED HEALTH CARE EDUCATION/TRAINING PROGRAM

## 2021-10-29 PROCEDURE — 84443 ASSAY THYROID STIM HORMONE: CPT | Performed by: NURSE PRACTITIONER

## 2021-10-29 PROCEDURE — 36415 COLL VENOUS BLD VENIPUNCTURE: CPT | Performed by: NURSE PRACTITIONER

## 2021-10-29 PROCEDURE — 250N000009 HC RX 250: Performed by: NURSE ANESTHETIST, CERTIFIED REGISTERED

## 2021-10-29 PROCEDURE — 87483 CNS DNA AMP PROBE TYPE 12-25: CPT | Performed by: STUDENT IN AN ORGANIZED HEALTH CARE EDUCATION/TRAINING PROGRAM

## 2021-10-29 PROCEDURE — 94003 VENT MGMT INPAT SUBQ DAY: CPT

## 2021-10-29 PROCEDURE — 86140 C-REACTIVE PROTEIN: CPT | Performed by: INTERNAL MEDICINE

## 2021-10-29 PROCEDURE — 84100 ASSAY OF PHOSPHORUS: CPT | Performed by: THORACIC SURGERY (CARDIOTHORACIC VASCULAR SURGERY)

## 2021-10-29 PROCEDURE — 85610 PROTHROMBIN TIME: CPT | Performed by: NURSE PRACTITIONER

## 2021-10-29 PROCEDURE — 89051 BODY FLUID CELL COUNT: CPT | Performed by: STUDENT IN AN ORGANIZED HEALTH CARE EDUCATION/TRAINING PROGRAM

## 2021-10-29 PROCEDURE — C1894 INTRO/SHEATH, NON-LASER: HCPCS | Performed by: THORACIC SURGERY (CARDIOTHORACIC VASCULAR SURGERY)

## 2021-10-29 PROCEDURE — 83735 ASSAY OF MAGNESIUM: CPT | Performed by: THORACIC SURGERY (CARDIOTHORACIC VASCULAR SURGERY)

## 2021-10-29 PROCEDURE — 84145 PROCALCITONIN (PCT): CPT | Performed by: NURSE PRACTITIONER

## 2021-10-29 PROCEDURE — 85025 COMPLETE CBC W/AUTO DIFF WBC: CPT | Performed by: STUDENT IN AN ORGANIZED HEALTH CARE EDUCATION/TRAINING PROGRAM

## 2021-10-29 PROCEDURE — 84439 ASSAY OF FREE THYROXINE: CPT | Performed by: NURSE PRACTITIONER

## 2021-10-29 PROCEDURE — 94640 AIRWAY INHALATION TREATMENT: CPT | Mod: 76

## 2021-10-29 PROCEDURE — 250N000011 HC RX IP 250 OP 636: Performed by: STUDENT IN AN ORGANIZED HEALTH CARE EDUCATION/TRAINING PROGRAM

## 2021-10-29 PROCEDURE — 84157 ASSAY OF PROTEIN OTHER: CPT | Performed by: STUDENT IN AN ORGANIZED HEALTH CARE EDUCATION/TRAINING PROGRAM

## 2021-10-29 PROCEDURE — 80197 ASSAY OF TACROLIMUS: CPT | Performed by: NURSE PRACTITIONER

## 2021-10-29 PROCEDURE — 250N000011 HC RX IP 250 OP 636

## 2021-10-29 PROCEDURE — 99233 SBSQ HOSP IP/OBS HIGH 50: CPT | Mod: 24 | Performed by: INTERNAL MEDICINE

## 2021-10-29 PROCEDURE — 370N000017 HC ANESTHESIA TECHNICAL FEE, PER MIN: Performed by: THORACIC SURGERY (CARDIOTHORACIC VASCULAR SURGERY)

## 2021-10-29 PROCEDURE — 360N000076 HC SURGERY LEVEL 3, PER MIN: Performed by: THORACIC SURGERY (CARDIOTHORACIC VASCULAR SURGERY)

## 2021-10-29 PROCEDURE — 94668 MNPJ CHEST WALL SBSQ: CPT

## 2021-10-29 PROCEDURE — 250N000009 HC RX 250: Performed by: PHYSICIAN ASSISTANT

## 2021-10-29 PROCEDURE — 250N000025 HC SEVOFLURANE, PER MIN: Performed by: THORACIC SURGERY (CARDIOTHORACIC VASCULAR SURGERY)

## 2021-10-29 PROCEDURE — 87899 AGENT NOS ASSAY W/OPTIC: CPT | Performed by: STUDENT IN AN ORGANIZED HEALTH CARE EDUCATION/TRAINING PROGRAM

## 2021-10-29 PROCEDURE — 62270 DX LMBR SPI PNXR: CPT | Performed by: INTERNAL MEDICINE

## 2021-10-29 PROCEDURE — C1769 GUIDE WIRE: HCPCS | Performed by: THORACIC SURGERY (CARDIOTHORACIC VASCULAR SURGERY)

## 2021-10-29 PROCEDURE — 250N000011 HC RX IP 250 OP 636: Performed by: INTERNAL MEDICINE

## 2021-10-29 PROCEDURE — 36600 WITHDRAWAL OF ARTERIAL BLOOD: CPT

## 2021-10-29 PROCEDURE — 85520 HEPARIN ASSAY: CPT | Performed by: THORACIC SURGERY (CARDIOTHORACIC VASCULAR SURGERY)

## 2021-10-29 PROCEDURE — 272N000001 HC OR GENERAL SUPPLY STERILE: Performed by: THORACIC SURGERY (CARDIOTHORACIC VASCULAR SURGERY)

## 2021-10-29 PROCEDURE — 250N000009 HC RX 250: Performed by: THORACIC SURGERY (CARDIOTHORACIC VASCULAR SURGERY)

## 2021-10-29 PROCEDURE — 0D164ZA BYPASS STOMACH TO JEJUNUM, PERCUTANEOUS ENDOSCOPIC APPROACH: ICD-10-PCS | Performed by: THORACIC SURGERY (CARDIOTHORACIC VASCULAR SURGERY)

## 2021-10-29 PROCEDURE — 31730 INTRO WINDPIPE WIRE/TUBE: CPT | Mod: 78 | Performed by: THORACIC SURGERY (CARDIOTHORACIC VASCULAR SURGERY)

## 2021-10-29 PROCEDURE — 250N000012 HC RX MED GY IP 250 OP 636 PS 637: Performed by: NURSE PRACTITIONER

## 2021-10-29 PROCEDURE — 87799 DETECT AGENT NOS DNA QUANT: CPT | Performed by: STUDENT IN AN ORGANIZED HEALTH CARE EDUCATION/TRAINING PROGRAM

## 2021-10-29 PROCEDURE — 999N000157 HC STATISTIC RCP TIME EA 10 MIN

## 2021-10-29 RX ORDER — FLUCONAZOLE 200 MG/1
400 TABLET ORAL DAILY
Status: DISCONTINUED | OUTPATIENT
Start: 2021-10-30 | End: 2021-11-02

## 2021-10-29 RX ORDER — MEROPENEM 1 G/1
1 INJECTION, POWDER, FOR SOLUTION INTRAVENOUS EVERY 8 HOURS
Status: COMPLETED | OUTPATIENT
Start: 2021-10-29 | End: 2021-11-02

## 2021-10-29 RX ORDER — PROPOFOL 10 MG/ML
INJECTION, EMULSION INTRAVENOUS PRN
Status: DISCONTINUED | OUTPATIENT
Start: 2021-10-29 | End: 2021-10-29

## 2021-10-29 RX ORDER — LIDOCAINE HYDROCHLORIDE 10 MG/ML
INJECTION, SOLUTION EPIDURAL; INFILTRATION; INTRACAUDAL; PERINEURAL PRN
Status: DISCONTINUED | OUTPATIENT
Start: 2021-10-29 | End: 2021-10-29 | Stop reason: HOSPADM

## 2021-10-29 RX ORDER — SULFAMETHOXAZOLE AND TRIMETHOPRIM 200; 40 MG/5ML; MG/5ML
10 SUSPENSION ORAL DAILY
Status: DISCONTINUED | OUTPATIENT
Start: 2021-10-30 | End: 2021-11-18

## 2021-10-29 RX ORDER — FENTANYL CITRATE 50 UG/ML
INJECTION, SOLUTION INTRAMUSCULAR; INTRAVENOUS PRN
Status: DISCONTINUED | OUTPATIENT
Start: 2021-10-29 | End: 2021-10-29

## 2021-10-29 RX ORDER — PROPOFOL 10 MG/ML
INJECTION, EMULSION INTRAVENOUS CONTINUOUS PRN
Status: DISCONTINUED | OUTPATIENT
Start: 2021-10-29 | End: 2021-10-29

## 2021-10-29 RX ORDER — SODIUM CHLORIDE, SODIUM LACTATE, POTASSIUM CHLORIDE, CALCIUM CHLORIDE 600; 310; 30; 20 MG/100ML; MG/100ML; MG/100ML; MG/100ML
INJECTION, SOLUTION INTRAVENOUS CONTINUOUS PRN
Status: DISCONTINUED | OUTPATIENT
Start: 2021-10-29 | End: 2021-10-29

## 2021-10-29 RX ORDER — ONDANSETRON 2 MG/ML
INJECTION INTRAMUSCULAR; INTRAVENOUS PRN
Status: DISCONTINUED | OUTPATIENT
Start: 2021-10-29 | End: 2021-10-29

## 2021-10-29 RX ORDER — SULFAMETHOXAZOLE AND TRIMETHOPRIM 400; 80 MG/1; MG/1
1 TABLET ORAL DAILY
Status: DISCONTINUED | OUTPATIENT
Start: 2021-10-30 | End: 2021-12-13 | Stop reason: HOSPADM

## 2021-10-29 RX ORDER — MYCOPHENOLATE MOFETIL 200 MG/ML
1500 POWDER, FOR SUSPENSION ORAL 2 TIMES DAILY
Status: DISCONTINUED | OUTPATIENT
Start: 2021-10-29 | End: 2021-11-02

## 2021-10-29 RX ORDER — DEXAMETHASONE SODIUM PHOSPHATE 4 MG/ML
INJECTION, SOLUTION INTRA-ARTICULAR; INTRALESIONAL; INTRAMUSCULAR; INTRAVENOUS; SOFT TISSUE PRN
Status: DISCONTINUED | OUTPATIENT
Start: 2021-10-29 | End: 2021-10-29

## 2021-10-29 RX ADMIN — HUMAN INSULIN 6 UNITS/HR: 100 INJECTION, SOLUTION SUBCUTANEOUS at 16:51

## 2021-10-29 RX ADMIN — ROCURONIUM BROMIDE 50 MG: 50 INJECTION, SOLUTION INTRAVENOUS at 08:03

## 2021-10-29 RX ADMIN — BUMETANIDE 2 MG: 0.25 INJECTION, SOLUTION INTRAMUSCULAR; INTRAVENOUS at 12:00

## 2021-10-29 RX ADMIN — PREDNISONE 15 MG: 5 TABLET ORAL at 20:16

## 2021-10-29 RX ADMIN — ISOSORBIDE DINITRATE 40 MG: 10 TABLET ORAL at 18:10

## 2021-10-29 RX ADMIN — Medication 1 PACKET: at 14:31

## 2021-10-29 RX ADMIN — ACETYLCYSTEINE 2 ML: 200 SOLUTION ORAL; RESPIRATORY (INHALATION) at 15:20

## 2021-10-29 RX ADMIN — ROCURONIUM BROMIDE 20 MG: 50 INJECTION, SOLUTION INTRAVENOUS at 09:18

## 2021-10-29 RX ADMIN — OXYMETAZOLINE HYDROCHLORIDE 2 SPRAY: 0.05 SPRAY NASAL at 10:52

## 2021-10-29 RX ADMIN — NOREPINEPHRINE BITARTRATE 6.4 MCG: 1 INJECTION, SOLUTION, CONCENTRATE INTRAVENOUS at 08:21

## 2021-10-29 RX ADMIN — PROPOFOL 40 MG: 10 INJECTION, EMULSION INTRAVENOUS at 08:20

## 2021-10-29 RX ADMIN — MEROPENEM 1 G: 1 INJECTION, POWDER, FOR SOLUTION INTRAVENOUS at 08:04

## 2021-10-29 RX ADMIN — DEXAMETHASONE SODIUM PHOSPHATE 4 MG: 4 INJECTION, SOLUTION INTRA-ARTICULAR; INTRALESIONAL; INTRAMUSCULAR; INTRAVENOUS; SOFT TISSUE at 08:04

## 2021-10-29 RX ADMIN — ACETAMINOPHEN 975 MG: 325 TABLET, FILM COATED ORAL at 18:10

## 2021-10-29 RX ADMIN — SUGAMMADEX 200 MG: 100 INJECTION, SOLUTION INTRAVENOUS at 10:00

## 2021-10-29 RX ADMIN — PREDNISONE 15 MG: 5 TABLET ORAL at 12:04

## 2021-10-29 RX ADMIN — LEVALBUTEROL HYDROCHLORIDE 1.25 MG: 1.25 SOLUTION RESPIRATORY (INHALATION) at 19:50

## 2021-10-29 RX ADMIN — OFLOXAXIN 5 DROP: 3 SOLUTION/ DROPS AURICULAR (OTIC) at 20:23

## 2021-10-29 RX ADMIN — NYSTATIN 1000000 UNITS: 500000 SUSPENSION ORAL at 12:01

## 2021-10-29 RX ADMIN — MYCOPHENOLATE MOFETIL 1500 MG: 200 POWDER, FOR SUSPENSION ORAL at 11:09

## 2021-10-29 RX ADMIN — ACETAMINOPHEN 975 MG: 325 TABLET, FILM COATED ORAL at 01:33

## 2021-10-29 RX ADMIN — MEROPENEM 1 G: 1 INJECTION, POWDER, FOR SOLUTION INTRAVENOUS at 16:16

## 2021-10-29 RX ADMIN — ACYCLOVIR 400 MG: 200 SUSPENSION ORAL at 11:07

## 2021-10-29 RX ADMIN — HYDRALAZINE HYDROCHLORIDE 100 MG: 50 TABLET, FILM COATED ORAL at 18:10

## 2021-10-29 RX ADMIN — NOREPINEPHRINE BITARTRATE 6.4 MCG: 1 INJECTION, SOLUTION, CONCENTRATE INTRAVENOUS at 08:02

## 2021-10-29 RX ADMIN — ONDANSETRON 4 MG: 2 INJECTION INTRAMUSCULAR; INTRAVENOUS at 09:36

## 2021-10-29 RX ADMIN — ROSUVASTATIN CALCIUM 10 MG: 10 TABLET, FILM COATED ORAL at 12:03

## 2021-10-29 RX ADMIN — MIDAZOLAM 2 MG: 1 INJECTION INTRAMUSCULAR; INTRAVENOUS at 07:40

## 2021-10-29 RX ADMIN — AMIODARONE HYDROCHLORIDE 200 MG: 200 TABLET ORAL at 12:02

## 2021-10-29 RX ADMIN — LEVALBUTEROL HYDROCHLORIDE 1.25 MG: 1.25 SOLUTION RESPIRATORY (INHALATION) at 15:20

## 2021-10-29 RX ADMIN — MYCOPHENOLATE MOFETIL 1500 MG: 200 POWDER, FOR SUSPENSION ORAL at 20:15

## 2021-10-29 RX ADMIN — FENTANYL CITRATE 25 MCG: 50 INJECTION, SOLUTION INTRAMUSCULAR; INTRAVENOUS at 08:28

## 2021-10-29 RX ADMIN — HYDRALAZINE HYDROCHLORIDE 100 MG: 50 TABLET, FILM COATED ORAL at 12:00

## 2021-10-29 RX ADMIN — LEVOTHYROXINE SODIUM 25 MCG: 0.03 TABLET ORAL at 12:03

## 2021-10-29 RX ADMIN — ACYCLOVIR 400 MG: 200 SUSPENSION ORAL at 20:16

## 2021-10-29 RX ADMIN — SULFAMETHOXAZOLE AND TRIMETHOPRIM 80 MG: 200; 40 SUSPENSION ORAL at 11:13

## 2021-10-29 RX ADMIN — NOREPINEPHRINE BITARTRATE 6.4 MCG: 1 INJECTION, SOLUTION, CONCENTRATE INTRAVENOUS at 08:11

## 2021-10-29 RX ADMIN — ACETYLCYSTEINE 2 ML: 200 SOLUTION ORAL; RESPIRATORY (INHALATION) at 12:51

## 2021-10-29 RX ADMIN — CARVEDILOL 12.5 MG: 6.25 TABLET, FILM COATED ORAL at 11:59

## 2021-10-29 RX ADMIN — ISOSORBIDE DINITRATE 40 MG: 10 TABLET ORAL at 12:02

## 2021-10-29 RX ADMIN — PHENYLEPHRINE HYDROCHLORIDE 100 MCG: 10 INJECTION INTRAVENOUS at 08:38

## 2021-10-29 RX ADMIN — OFLOXAXIN 5 DROP: 3 SOLUTION/ DROPS AURICULAR (OTIC) at 10:50

## 2021-10-29 RX ADMIN — NOREPINEPHRINE BITARTRATE 6.4 MCG: 1 INJECTION, SOLUTION, CONCENTRATE INTRAVENOUS at 08:22

## 2021-10-29 RX ADMIN — ACETAMINOPHEN 975 MG: 325 TABLET, FILM COATED ORAL at 12:02

## 2021-10-29 RX ADMIN — PHENYLEPHRINE HYDROCHLORIDE 100 MCG: 10 INJECTION INTRAVENOUS at 09:10

## 2021-10-29 RX ADMIN — OXYMETAZOLINE HYDROCHLORIDE 2 SPRAY: 0.05 SPRAY NASAL at 00:35

## 2021-10-29 RX ADMIN — NOREPINEPHRINE BITARTRATE 6.4 MCG: 1 INJECTION, SOLUTION, CONCENTRATE INTRAVENOUS at 08:27

## 2021-10-29 RX ADMIN — Medication 40 MG: at 16:17

## 2021-10-29 RX ADMIN — Medication 40 MG: at 11:11

## 2021-10-29 RX ADMIN — MEROPENEM 1 G: 1 INJECTION, POWDER, FOR SOLUTION INTRAVENOUS at 01:13

## 2021-10-29 RX ADMIN — OXYMETAZOLINE HYDROCHLORIDE 2 SPRAY: 0.05 SPRAY NASAL at 05:29

## 2021-10-29 RX ADMIN — ROCURONIUM BROMIDE 30 MG: 50 INJECTION, SOLUTION INTRAVENOUS at 08:36

## 2021-10-29 RX ADMIN — NYSTATIN 1000000 UNITS: 500000 SUSPENSION ORAL at 16:16

## 2021-10-29 RX ADMIN — ACETYLCYSTEINE 2 ML: 200 SOLUTION ORAL; RESPIRATORY (INHALATION) at 19:50

## 2021-10-29 RX ADMIN — BUMETANIDE 2 MG: 0.25 INJECTION, SOLUTION INTRAMUSCULAR; INTRAVENOUS at 18:10

## 2021-10-29 RX ADMIN — Medication 1 PACKET: at 20:23

## 2021-10-29 RX ADMIN — PROPOFOL 50 MCG/KG/MIN: 10 INJECTION, EMULSION INTRAVENOUS at 08:21

## 2021-10-29 RX ADMIN — AMLODIPINE BESYLATE 10 MG: 10 TABLET ORAL at 12:03

## 2021-10-29 RX ADMIN — NYSTATIN 1000000 UNITS: 500000 SUSPENSION ORAL at 20:16

## 2021-10-29 RX ADMIN — TACROLIMUS 2 MG: 5 CAPSULE ORAL at 11:12

## 2021-10-29 RX ADMIN — PHENYLEPHRINE HYDROCHLORIDE 100 MCG: 10 INJECTION INTRAVENOUS at 09:37

## 2021-10-29 RX ADMIN — POTASSIUM CHLORIDE 20 MEQ: 40 SOLUTION ORAL at 11:59

## 2021-10-29 RX ADMIN — SODIUM CHLORIDE, POTASSIUM CHLORIDE, SODIUM LACTATE AND CALCIUM CHLORIDE: 600; 310; 30; 20 INJECTION, SOLUTION INTRAVENOUS at 07:40

## 2021-10-29 RX ADMIN — CALCIUM CARBONATE 600 MG (1,500 MG)-VITAMIN D3 400 UNIT TABLET 1 TABLET: at 18:10

## 2021-10-29 RX ADMIN — LEVALBUTEROL HYDROCHLORIDE 1.25 MG: 1.25 SOLUTION RESPIRATORY (INHALATION) at 12:51

## 2021-10-29 RX ADMIN — TACROLIMUS 2 MG: 5 CAPSULE ORAL at 18:11

## 2021-10-29 RX ADMIN — CALCIUM CARBONATE 600 MG (1,500 MG)-VITAMIN D3 400 UNIT TABLET 1 TABLET: at 12:01

## 2021-10-29 RX ADMIN — PHENYLEPHRINE HYDROCHLORIDE 100 MCG: 10 INJECTION INTRAVENOUS at 09:22

## 2021-10-29 RX ADMIN — FENTANYL CITRATE 25 MCG: 50 INJECTION, SOLUTION INTRAMUSCULAR; INTRAVENOUS at 08:43

## 2021-10-29 RX ADMIN — HYDRALAZINE HYDROCHLORIDE 100 MG: 50 TABLET, FILM COATED ORAL at 01:34

## 2021-10-29 RX ADMIN — CARVEDILOL 12.5 MG: 6.25 TABLET, FILM COATED ORAL at 20:16

## 2021-10-29 RX ADMIN — MULTIVIT AND MINERALS-FERROUS GLUCONATE 9 MG IRON/15 ML ORAL LIQUID 15 ML: at 11:59

## 2021-10-29 ASSESSMENT — ACTIVITIES OF DAILY LIVING (ADL)
ADLS_ACUITY_SCORE: 22

## 2021-10-29 ASSESSMENT — MIFFLIN-ST. JEOR: SCORE: 1427

## 2021-10-29 ASSESSMENT — ENCOUNTER SYMPTOMS: DYSRHYTHMIAS: 1

## 2021-10-29 NOTE — PROGRESS NOTES
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    OVERNIGHT EVENTS:  NAEO    TODAY'S PROGRESS:   - trach/PEG-J today   - LP following OR   - hold heparin for 24h postop   - FWF to 75 q4h   - lantus to 15u q day, continue insulin gtt  - tacro will be given via gtube postop     PLAN:  Neuro/ pain/ sedation:  Acute postoperative pain  Anxiety and depression  Acute to subacute CVA, b/l cerebral hemispheres and L cerebellum, suspect embolic  Encephalopathy and diffuse weakness  R ear lateral canal floor excoriation with bleeding     - Pain: none  - Sedation: off  - PTA escitalopram on hold   - LP per neuro   - appreciate ENT, continue otowicks per their plan, floxin drops x5d, afrin prn     Pulmonary care:   SALTY on home CPAP  Acute hypoxic respiratory failure s/p b/l lung transplant 10/16/21  - Ventilator Settings:CMV- 18/400/5/45  - Levalbuterol nebs and Mucomyst, chest PT  - Daily CXR  - PST BID  - diuresis as below   - trach and peg-j today     Cardiovascular:    HTN  Afib   PFO  TC 10/23 no vegetations  Cardiogenic shock followed by severe HTN - now normotensive    - Monitor hemodynamic status.   - MAP goal <100, SBP <140  - Amlodipine 10 mg daily, hydralazine 100 mg oral q6, isordil 40 tid, coreg 12.5 BID   - prn labetalol  - rosuvastatin 10mg  - Amiodarone 200 mg daily   - low-intensity heparin gtt to be held x24h after trach     GI care/ Nutrition:   Elevated LFTs - resolved   GI bleed 2/2 NG trauma per GI, resolved    Nutritional support   - Diet: goal TF via NJ, transition to via PEG-J postop    - PPI BID  - Continue bowel regimen: miralax, senna, prn MoM, suppository     Renal/ Fluid Balance/ Electrolytes:   Acute kidney injury - Cr rising today   Hypernatremia, improved with FWF  BL creat appears to be ~ 0.7  - bumex 2 TID for goal net negative -500cc  - free water flushes 75 q4  - Strict I/O, daily weights  - Avoid/limit nephrotoxins as able  - Replete lytes PRN per protocol     Endocrine:    Stress induced hyperglycemia with steroid-induced component, on DM2  Hypothyroidism  RA  Preop A1c 6.6  - insulin gtt, lantus 15u today   - Goal BG <180 for optimal healing  - continue home synthroid     ID/ Antibiotics:  Immunosuppression s/p transplant  Candidal infection of donor lungs (likely source), pleural fluid, and fungemia   - continue fluconazole pending clinical course  - empiric meropenem to continue x5d  - Nystatin, Acyclovir, bactrim   - Tacrolimus, prednisone - tacro via G postop   - LP today   - Follow cultures   - appreciate transplant ID, ophthalmology   - Continue to monitor fever curve, WBC and inflammatory markers as appropriate      Heme:     Acute blood loss anemia  Hypogammaglobulinemia s/p IVIG  Thrombocytopenia, HIT negative - resolved   No s/sx active bleeding  - CBC   - ID as above    MSK/ Skin:  Clamshell surgical incision  - Sternal precautions  - Postoperative incision management per protocol  - PT/OT/CR     Prophylaxis:    - Mechanical prophylaxis for DVT: SCDs  - Chemical DVT prophylaxis: held   - PPI for 30 days postoperatively     Lines/ tubes/ drains:  - ETT  - Watson 10/25   - OG    - NJ      Disposition:  - CV ICU.      Patient seen, findings and plan discussed with CV ICU staff.     Altagracia Allan MD  Surgery Resident PGY-3  Pg [phone]    ====================================    SUBJECTIVE:   NAEO.      OBJECTIVE:   1. VITAL SIGNS:   Temp:  [98.8  F (37.1  C)-100.8  F (38.2  C)] 99.5  F (37.5  C)  Pulse:   [72-96] 80  Resp:  [21-35] 26  BP: ()/(48-94) 113/70  FiO2 (%):  [45 %] 45 %  SpO2:  [90 %-99 %] 97 %  Ventilation Mode: CMV/AC  (Continuous Mandatory Ventilation/ Assist Control)  FiO2 (%): 45 %  Rate Set (breaths/minute): 18 breaths/min  Tidal Volume Set (mL): 400 mL  PEEP (cm H2O): 5 cmH2O  Oxygen Concentration (%): 45 %  Resp: 26    2. INTAKE/ OUTPUT:   I/O last 3 completed shifts:  In: 3178.86 [I.V.:576.86; NG/GT:1627]  Out: 3428 [Urine:2365; Emesis/NG output:750; Stool:300; Chest Tube:13]    3. PHYSICAL EXAMINATION:   General: critically ill  HEENT: NJ, OG (green-brown)   Neuro: eyes open, moves both feet to pain, turns head away from discomfort   Resp: intubated, overbreathing vent  CV: RRR on tele   Abdomen: nondistended, soft   Incisions: c/d/i  Extremities: warm and well perfused, no edema  CTs serosanguinous     4. INVESTIGATIONS:   Arterial Blood Gases   Recent Labs   Lab 10/29/21  0502 10/28/21  0502 10/27/21  0335 10/26/21  0522   PH 7.53* 7.51* 7.50* 7.56*   PCO2 37 37 36 30*   PO2 97 114* 130* 92   HCO3 31* 30* 28 27     Complete Blood Count   Recent Labs   Lab 10/29/21  0542 10/28/21  0530 10/27/21  0523 10/26/21  1020   WBC 38.8* 41.0* 37.3* 35.8*   HGB 9.4* 10.3* 10.8* 9.9*    246 273 341     Basic Metabolic Panel  Recent Labs   Lab 10/29/21  0627 10/29/21  0542 10/29/21  0412 10/29/21  0309 10/28/21  0607 10/28/21  0530 10/27/21  2220 10/27/21  2130 10/27/21  1806 10/27/21  1635 10/27/21  0621 10/27/21  0523 10/26/21  1717 10/26/21  1452   NA  --  139  --   --   --  143  --  147*  --  148*   < > 152*   < > 151*   POTASSIUM  --  4.6  --   --   --  5.0  --   --   --  5.2  --  4.4   < > 5.0   CHLORIDE  --  107  --   --   --  110*  --   --   --   --   --  120*  --  121*   CO2  --  26  --   --   --  30  --   --   --   --   --  27  --  25   BUN  --  72*  --   --   --  60*  --   --   --   --   --  60*  --  63*   CR  --  1.23  --   --   --  1.04  --   --   --   --   --  0.90  --  1.16   GLC  123* 128* 136* 135*   < > 169*   < >  --    < >  --    < > 208*   < > 244*    < > = values in this interval not displayed.     Liver Function Tests  Recent Labs   Lab 10/29/21  0542 10/28/21  0530 10/27/21  0523 10/26/21  0637   AST 20 21 27 21   ALT 35 47 71* 71*   ALKPHOS 106 130 160* 160*   BILITOTAL 0.3 0.7 0.3 0.3   ALBUMIN 1.0* 1.7* 1.8* 1.8*   INR 1.13 1.13 1.16* 1.26*     Pancreatic Enzymes  No lab results found in last 7 days.  Coagulation Profile  Recent Labs   Lab 10/29/21  0542 10/28/21  0530 10/27/21  0523 10/26/21  0637 10/23/21  0344 10/22/21  1407   INR 1.13 1.13 1.16* 1.26*   < > 1.28*   PTT  --   --   --   --   --  43*    < > = values in this interval not displayed.         5. RADIOLOGY:   Recent Results (from the past 24 hour(s))   XR Chest Port 1 View    Narrative    Exam: XR CHEST PORT 1 VIEW, 10/28/2021 3:04 PM    Indication: chest tubes removed    Comparison: Same-day radiograph from 0:48 AM    Findings:   Single portable AP view of the chest. Endotracheal tube 4.5 cm from  the shiv. 2 enteric tubes with the tip below the field of view.  Clamshell sternotomy sutures. Skin staples transverse in the lower  chest. Mediastinal and lower right chest surgical clips. Interval  removal of bilateral chest tubes.    Midline trachea. No pneumothorax. Small left and trace right pleural  effusions. Bibasilar atelectasis. Mild pulmonary congestion.  Unremarkable cardiac silhouette and mediastinum. No acute osseous  abnormalities.      Impression    Impression:   1. Removal of bilateral chest tubes without pneumothorax.  2. Small left and trace right pleural effusions.  3. Mild pulmonary vascular congestion.    I have personally reviewed the examination and initial interpretation  and I agree with the findings.    DAVIN ANGUIANO MD         SYSTEM ID:  [id number]       =========================================  Critical Care Services Progress Note:     Edson Thornton remains critically ill with mental status  changes and hypoxic respiratory failure     I personally examined and evaluated the patient today.   The patient s prognosis today is tentative  I have evaluated all laboratory values and imaging studies from the past 24 hours.  Key findings and decisions made today included Patient went to OR today for trach and peg, LP also performed. Empiric meropenem started with concerns for rising white count and fever curve. Sources are unlikely however I cant absolutely guarantee there is no infection. Patient also moved his legs this morning which is a small glimmer of hope given his overall status. We will continue to watch and wait  I personally managed the ventilator, metabolic abnormalities, and antibiotic therapy.   Consults ongoing and ordered are none  Procedures that will happen today are: none  All treatments were placed at my direction.  I formulated today s plan with the house staff team or resident(s) and agree with the findings and plan in the associated note.       The above plans and care have been discussed with none and all questions and concerns were addressed.     I spent a total of 33 minutes (excluding procedure time) personally providing and directing critical care services at the bedside and on the critical care unit for Edson Thornton.        Ronnie Ron MD

## 2021-10-29 NOTE — ANESTHESIA PREPROCEDURE EVALUATION
Anesthesia Pre-Procedure Evaluation    Patient: Edson Thornton   MRN: [id number] : 1965        Preoperative Diagnosis: Ventilator dependence (H) [Z99.11]  Failure to thrive in adult [R62.7]    Procedure : Procedure(s):  Percutaneous Tracheostomy,  Percutaneous gastrojejunostomy, possible open          A 56 year old gentleman immunosuppressed (tacrolimus, mycophenolate, prednisone) s/p a 10/16/21 bilateral lung transplant for NSIP / ILD with rheumatoid arthritis who also has a history of bronchiectasis, moderate PH, SALTY, chronic HSV infection, hypogammaglobulinemia, steroid-induced diabetes, hypothyroidism, hypertension, hyperlipidemia, duodenal anomaly, anxiety, and depression.  He was admitted to [facility] on 21 for acute on chronic respiratory failure due to an ILD exacerbation and underwent lung transplant on 10/16/21.  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  He had fevers on 10/19/21 and on 10/20/21 and a corresponding 10/20/21 blood culture grew pan-susceptible Candida albicans.      Past Medical History:   Diagnosis Date     BRENNAN (acute kidney injury) (H) 10/17/2021     Anxiety      Depression      HLD (hyperlipidemia)      HTN (hypertension)      Hypothyroidism      ILD (interstitial lung disease) (H)      SALTY on CPAP      Oxygen dependent     BL 4L since ~2021     Rheumatoid arthritis (H)     signs ~2020, dx 2021     S/P lung transplant (H) 10/16/2021     Shock liver 10/17/2021     Steroid-induced hyperglycemia      Traction bronchiectasis (H)       Past Surgical History:   Procedure Laterality Date     COLONOSCOPY W/ BIOPSIES AND POLYPECTOMY  2020     CV CORONARY ANGIOGRAM N/A 2021    Procedure: Coronary Angiogram with possible intervention;  Surgeon: Jovon Bullock MD;  Location: [facility] CARDIAC CATH LAB     CV RIGHT HEART CATH MEASUREMENTS RECORDED N/A 2021    Procedure: Right Heart Cath;  Surgeon: Jovon Bullock MD;  Location: [facility]  CARDIAC CATH LAB     ESOPHAGOSCOPY, GASTROSCOPY, DUODENOSCOPY (EGD), COMBINED N/A 10/23/2021    Procedure: ESOPHAGOGASTRODUODENOSCOPY (EGD);  Surgeon: Miquel Pisano MD;  Location: [facility] GI     EXTRACTION(S) DENTAL N/A 9/22/2021    Procedure: EXTRACTION tooth #19;  Surgeon: Deepak Tobin DDS;  Location: [facility] OR     HERNIA REPAIR       right acl       TRANSPLANT LUNG RECIPIENT SINGLE X2 Bilateral 10/16/2021    Procedure: TRANSPLANT, LUNG, RECIPIENT, BILATERAL, Bronchoscopy, on-pump perfusion, bilateral clamshell sternotomy;  Surgeon: Yanick Corral MD;  Location: [facility] OR     XR ACROMIOCLAVICULAR JOINT BILATERAL        No Known Allergies   Social History     Tobacco Use     Smoking status: Never Smoker     Smokeless tobacco: Never Used   Substance Use Topics     Alcohol use: Never      Wt Readings from Last 1 Encounters:   10/29/21 68.6 kg (151 lb 3.8 oz)        Anesthesia Evaluation   Pt has had prior anesthetic. Type: General.        ROS/MED HX  ENT/Pulmonary: Comment: S/P bilateral lung transplant for interstitial lung disease  Complicated post transplant course with possible strokes related to atrial fibrillation  Infection with fungus    (+) sleep apnea, uses CPAP,     Neurologic:     (+) CVA,     Cardiovascular:     (+) hypertension-----dysrhythmias, a-fib, pulmonary hypertension,     METS/Exercise Tolerance:     Hematologic:     (+) history of blood transfusion, no previous transfusion reaction,     Musculoskeletal: Comment: Rheumatoid arthritis  (+) arthritis,     GI/Hepatic: Comment: Elevated liver enzymes - shock liver      Renal/Genitourinary:     (+) renal disease, type: CRI,     Endo:     (+) thyroid problem, hypothyroidism,     Psychiatric/Substance Use:       Infectious Disease: Comment: Recent candidemia    (+) Recent Fever,     Malignancy:       Other:            Physical Exam    Airway      Comment: Intubated         Respiratory Devices and Support    ETT:      Dental            Cardiovascular          Rhythm and rate: regular     Pulmonary           (+) rhonchi           OUTSIDE LABS:  CBC:   Lab Results   Component Value Date    WBC 38.8 (H) 10/29/2021    WBC 41.0 (H) 10/28/2021    HGB 9.4 (L) 10/29/2021    HGB 10.3 (L) 10/28/2021    HCT 30.4 (L) 10/29/2021    HCT 33.2 (L) 10/28/2021     10/29/2021     10/28/2021     BMP:   Lab Results   Component Value Date     10/29/2021     10/28/2021    POTASSIUM 4.6 10/29/2021    POTASSIUM 5.0 10/28/2021    CHLORIDE 107 10/29/2021    CHLORIDE 110 (H) 10/28/2021    CO2 30 10/28/2021    CO2 27 10/27/2021    BUN 60 (H) 10/28/2021    BUN 60 (H) 10/27/2021    CR 1.04 10/28/2021    CR 0.90 10/27/2021     (H) 10/29/2021     (H) 10/29/2021     COAGS:   Lab Results   Component Value Date    PTT 43 (H) 10/22/2021    INR 1.13 10/29/2021    FIBR 830 (H) 10/22/2021     POC: No results found for: BGM, HCG, HCGS  HEPATIC:   Lab Results   Component Value Date    ALBUMIN 1.7 (L) 10/28/2021    PROTTOTAL 5.9 (L) 10/28/2021    ALT 47 10/28/2021    AST 21 10/28/2021    ALKPHOS 130 10/28/2021    BILITOTAL 0.7 10/28/2021    DOREEN 29 10/25/2021     OTHER:   Lab Results   Component Value Date    PH 7.53 (H) 10/29/2021    LACT 1.3 10/26/2021    A1C 6.6 (H) 09/07/2021    ERIK 9.0 10/28/2021    PHOS 4.9 (H) 10/29/2021    MAG 1.8 10/29/2021    AMYLASE 32 09/07/2021    TSH 0.11 (L) 09/07/2021    T4 0.68 (L) 09/07/2021    CRP 53.0 (H) 10/29/2021       Anesthesia Plan    ASA Status:  3      Anesthesia Type: General.     - Airway: ETT   Induction: Intravenous.   Maintenance: Balanced.        Consents    Anesthesia Plan(s) and associated risks, benefits, and realistic alternatives discussed. Questions answered and patient/representative(s) expressed understanding.     - Discussed with:  Other (See Comment)         Postoperative Care    Pain management: Multi-modal analgesia.   PONV prophylaxis: Ondansetron (or other 5HT-3)      Comments:         H&P reviewed: Unable to attach H&P to encounter due to EHR limitations. H&P Update: appropriate H&P reviewed, patient examined. No interval changes since H&P (within 30 days).         Sebastian Alonso MD

## 2021-10-29 NOTE — PLAN OF CARE
Neuro: Pt's level of arousal increasing overnight; pt opens eyes to voice and grimaces to pain. BLE withdraw to pain, BUE remain unresponsive to stimuli. Pupils are equal and reactive. Max temperature 100.8.      Cardiac: SR. HR 70s-80s. MAPS>65, SBP<140.      Respiratory: On CMV settings (RR 18, Peep 5, ) at 45%. RR 20s. Lung sounds coarse/diminished. Moderate amount of tan/tanya, thick secretions present in ETT.     GI/: OG to LIS. NJ infusing with tube feeding; tube feeding stopped at 0100 for AM procedures. Insulin gtt infusing and stopped when tube feeds stopped at 0100; pt able to maintain blood sugars of 130s - D10 gtt not started. Urine output 45-85ml/hr. Rectal pouch in place with loose stool present.      Incisons: Chest incision CDI/KIM.     Lines: PIVx3. Heparin gtt infusing and stopped at 0400 for AM procedures.      No family called RN overnight.      Plan: Continue to monitor and update MD as needed. Continue plan of care.       Problem: Adult Inpatient Plan of Care  Goal: Plan of Care Review  Outcome: No Change

## 2021-10-29 NOTE — ANESTHESIA CARE TRANSFER NOTE
Patient: Edson Thornton    Procedure: Procedure(s):  Percutaneous Tracheostomy,  Percutaneous gastrojejunostomy       Diagnosis: Ventilator dependence (H) [Z99.11]  Failure to thrive in adult [R62.7]  Diagnosis Additional Information: No value filed.    Anesthesia Type:   General     Note:    Oropharynx: oropharynx clear of all foreign objects and ventilatory support  Level of Consciousness: unresponsive    Level of Supplemental Oxygen (L/min / FiO2): 80  Independent Airway: airway patency not satisfactory and stable  Dentition: dentition unchanged  Vital Signs Stable: post-procedure vital signs reviewed and stable  Report to RN Given: handoff report given  Patient transferred to: ICU  Comments: Transported to ICU on transport monitor with MDA and Circulating RN. VSS, Vent settings per ICU team.   ICU Handoff: Call for PAUSE to initiate/utilize ICU HANDOFF, Identified Patient, Identified Responsible Provider, Reviewed the Pertinent Medical History, Discussed Surgical Course, Reviewed Intra-OP Anesthesia Management and Issues during Anesthesia, Set Expectations for Post Procedure Period and Allowed Opportunity for Questions and Acknowledgement of Understanding      Vitals:  Vitals Value Taken Time   BP     Temp     Pulse     Resp     SpO2         Electronically Signed By: ILIR Jaimes CRNA  October 29, 2021  10:10 AM

## 2021-10-29 NOTE — OP NOTE
Procedure Date: 10/29/2021    PREOPERATIVE DIAGNOSES:    1.  End-stage lung disease, status post bilateral lung transplant.  2.  Hypoxic hypercarbic respiratory failure.  3.  Failure to thrive.    POSTOPERATIVE DIAGNOSES:    1.  End-stage lung disease, status post bilateral lung transplant.  2.  Hypoxic hypercarbic respiratory failure.  3.  Failure to thrive.    PROCEDURES PERFORMED:    1.  Flexible bronchoscopy.  2.  Percutaneous tracheostomy.  3.  Percutaneous endoscopic gastrojejunostomy tube placement.  4.  Gastrojejunostomy placement under fluoroscopic guidance.  4.  Supervision and interpretation of intraoperative imaging.    ANESTHESIA:  General endotracheal anesthesia.    SURGEON:  Celine Ball MD    ASSISTANT:  Yanick Dubois MD, General Surgery resident.    COMPLICATIONS:  None.    ESTIMATED BLOOD LOSS:  Minimal.    SPECIMENS:  None.    DRAINS AND TUBES:    1.  A 6-0 Shiley tracheostomy.  2.  An 18 Irish gastrojejunostomy tube, G port to gravity.    OPERATIVE FINDINGS:  The tracheostomy was placed without any problems.  We placed a trach between the second and third tracheal rings.  The balloon held insufflation at the end of the case.  We confirmed hemostasis above and below the trach.    The gastrojejunostomy tube was placed in the left upper abdomen.  A completion fluoroscopic view confirmed adequate placement.  The balloon was inflated with 10 mL of saline.    DESCRIPTION OF PROCEDURE IN DETAIL:  The patient was taken to the operating room, laid supine.  General anesthesia was induced.  A formal timeout was carried out confirming the patient and correct procedure.  He had SCDs in place and functioning prior to induction of anesthesia.  He received antibiotics within 30 minutes of incision.  After the timeout was complete, we started by performing a flexible bronchoscopy.  We cleaned up all of the secretions.  The bronchial anastomosis appeared to be widely patent.  The patient had a  moderate amount of white, thick secretions.  Next, the endotracheal tube was unsecured and pulled back to the level of the cricoid.  Under direct bronchoscopic guidance, we used a needle to enter between the second and third tracheal rings.  The skin was prepped with ChloraPrep and draped in a normal sterile fashion.  We then advanced the wire down the needle into the airway using a Seldinger technique.  Over the wire, we dilated the tract and introduced a 6-0 Shiley tracheostomy with a minimal resistance.  After introducing the trach, we switched from orotracheal to transtracheal ventilation.  We had excellent end tidal CO2.  We performed a transtracheal bronchoscopy and we suctioned all of the secretions.  The patient had a minimal amount of bloody secretions that were suctioned.  We confirmed hemostasis above and below the trach.  We inflated the balloon with air and it appeared to be holding insufflation.  The trach was secured in 4 points using 2-0 nylon and trach ties around the neck.    We then proceeded to perform the gastrojejunostomy tube placement.  We used an adult scope and introduced into the esophagus and advanced into the stomach, insufflated and transilluminated.  The patient was positioned in reverse Trendelenburg.  Using a needle, we accessed the gastric lumen.  We use an Amplatz Super Stiff wire and advanced through the needle.  We then used the endoscope to pull the wire out through the mouth.  The wire was connected to a PEG tube, which was pulled from the abdominal side, bringing the PEG tube through the abdominal wall.  Next, the PEG tube was cut and we used a PMI suture introducer to grasp the Dobbhoff tube.  We then pulled the Dobbhoff tube from the gastric lumen outside of the patient's stomach.  The distal part of the Dobbhoff was confirmed to be in the third portion of the duodenum with fluoroscopy.  We then used an Amplatz Super Stiff wire, which we advanced through the Dobbhoff tube  into the lumen of the jejunum.  Over the wire, we exchanged the Dobbhoff for an 18-Citizen of [place] gastrojejunostomy tube.  Placement was confirmed with fluoroscopy.      The patient tolerated the procedure well.  The tube was secured to the skin with a stitch and we inflated the balloon, which was in the stomach.  All instrument and sponge counts were correct at the end of the case.    Celine Ball MD        D: 10/29/2021   T: 10/29/2021   MT: HARI    Name:     KARSTEN GUERRIERLESLEE ELDRIDGE  MRN:      [id number]        Account:        [id number]   :      1965           Procedure Date: 10/29/2021     Document: [id number]

## 2021-10-29 NOTE — BRIEF OP NOTE
[facility]    Brief Operative Note    Pre-operative diagnosis: Ventilator dependence (H) [Z99.11]  Failure to thrive in adult [R62.7]  Post-operative diagnosis Same as pre-operative diagnosis    Procedure: Procedure(s):  Percutaneous Tracheostomy,  Percutaneous gastrojejunostomy  Surgeon: Surgeon(s) and Role:     * Celine Jenkins MD - Primary  Anesthesia: General   Estimated Blood Loss: 5 mL from 10/29/2021  7:47 AM to 10/29/2021  9:52 AM      Drains:  GJ tube  Specimens: * No specimens in log *  Findings:   None.  Complications: None.  Implants: * No implants in log *      Post op:  Ok to use J tube for meds and feeds today  G tube to gravity  GJ sutured in place  Thoracic surgery will change trach in 2 weeks, trach sutures should stay in until then

## 2021-10-29 NOTE — PLAN OF CARE
Major Shift Events:  Pt moved lower extremities spontaneously and to painful stimulus, does not follow commands, opens eyes when you say his name and moves head left and right inconsistently.  Pt got trach and peg placed today.  Lung sounds course, moderate thick rust secretions.  Pt SR, BP WNL.  Remain febrile today tmax 100.  Md aware and saw pt's rash on his back, no new orders at this time.  Feedings restarted, G tube to gravity drainage, clamping after tacrolimus administration for 2 hours.  Was originally green/brown bile appearance, now more brown coffee ground appearance, MD notified, no new orders.  Blood sugars remain elevated, insulin drip restarted.  Pt's right ear still bleeding, has slowed since heparin drip off for procedures.  Lumbar puncture done today, pt tolerated well. Updated Peg and Kurtis (significant other and father).  Watson adequate urine output, rectal pouch in place, only some stool in tubing this shift.   Plan: Continue to monitor new trach and peg.  For vital signs and complete assessments, please see documentation flowsheets.    Problem: Adult Inpatient Plan of Care  Goal: Optimal Comfort and Wellbeing  Outcome: No Change

## 2021-10-29 NOTE — PROGRESS NOTES
Pulmonary Medicine  Cystic Fibrosis - Lung Transplant Team  Progress Note  10/29/2021       Patient: Edson Thornton  MRN: [id number]  : 1965 (age 56 year old)  Transplant: 10/16/2021 (Lung), POD#13  Admission date: 2021    Assessment & Plan:     Edson Thornton is a 56 year old male with a PMH significant for NSIP/ILD, bronchiectasis, moderate PH, RA, SALTY, chronic HSV infection, hypogammaglobulinemia, steroid-induced diabetes, hypothyroidism, PFO, HTN, HLD, duodenal anomaly, anxiety, and depression.  Admitted on 21 from OSH for acute on chronic respiratory failure 2/2 ILD exacerbation, now s/p BSLT on 10/16/21.  Surgery relatively uncomplicated but pt. with low cardiac index and PGD immediately post-op.  Caroline weaned off 10/22, remains intubated.  Post-op course complicated by encephalopathy and diffuse weakness, acute to subacute CVA, afib with RVR, BRENNAN, GI bleed due to NJ/OG trauma, Candidemia, and recurring fevers.  Neuro status remains tenuous, though slight improvement noted this morning.      Today's recommendations:   - Trach and PEG/J tube with thoracic surgery this morning  - Tacrolimus level today to trend, defer dose adjustment, daily levels to trend for now  - Prednisone taper ordered 10/31  - CMV and EBV   - Coccidioides Ag   - LP today, primary team to coordinate with appropriate team  - Meropenem for empiric ABX coverage given recurrent fevers  - Repeat IgG   - DSA repeat ordered   - Donor risk labs 11/15     S/p BSLT for ILD:  Acute hypoxic respiratory failure:   Pulmonary edema:  Subcutaneous emphysema: Unfortunately had not received vaccination for flu, PNA, or COVID-19 PTA.  Explant pathology with NSIP, no malignancy.  PGD 2-3.  Weaned off paralytic 10/19 (for vent dyssynchrony), pressors 10/21 (now hypertensive), and Caroline 10/22.  Initial difficulty weaning sedation given agitation then with neurological findings as below.  CXR initially post-op with pulmonary  edema, aggressively diuresed.  Recurring fevers post-op, see ID section below.  Bronch 10/20 with tan secretions to LLL, repeat 10/23 with frothy clear secretions in right TB tree.  Chest CT (10/25) with interval improvement in consolidative opacities to BLL and KARI with trace right hydroPTX (personally reviewed).  Bronch (10/26) with minimal sticky secretions, anastomosis intact.  Remains intubated and with persistent encephalopathy as below.  - Nebs: levalbuterol and Mucomyst QID  - Aggressive pulmonary toilet with chest physiotherapy QID  - Ventilator management and PST per ICU team  - Trach and PEG/J tube with thoracic surgery today  - Agree with ongoing diuresis as tolerated  - CXR today with remains unchanged in regards to bilateral pleural effusions (personally reviewed)     Immunosuppression: s/p induction therapy with basiliximab 10/16 (and high dose IV steroid) and 10/20  - Tacrolimus 2 mg BID suspension (increased 10/28, fluconazole 10/26, recently held 10/23-10/25 due to supratherapeutic levels).  Goal level 8-12.  Level today 7.7, no dose adjustment today, continue daily levels for now.  - MMF 1500 mg BID (on PTA, AZA to be avoided given TPMT)  - Prednisone 15 mg BID with next taper on 10/31 (ordered) per lung transplant protocol:  Date AM dose (mg) PM dose (mg)   10/24/21 15 15   10/31/21 15 12.5   11/7/21 12.5 12.5   11/21/21 12.5 10   12/5/21 10 10   1/2/22 10 7.5   1/30/22 7.5 7.5   2/27/22 7.5 5   3/27/22 5 5   4/24/22 5 2.5      Prophylaxis:   - Bactrim for PJP ppx (10/25, held prior d/t BRENNAN)  - Nystatin for oral candidiasis ppx, 6 month course  - See below for serologies and viral ppx:    Donor Recipient Intervention   CMV status Negative Negative None, CMV monthly (ordered 11/16)   EBV status Positive Positive None, EBV monthly (ordered 11/16)   HSV status N/A Positive Acyclovir POD #1-30   (recent infection history pre-txp)      ID: Concern for possible Strongyloides exposure pre-transplant s/p  ivermectin x1 dose (9/17).  Donor and recipient cultures NGTD.  S/p IV ceftazidime/vancomycin for 48h per protocol and additional empiric ceftazidime 10/19-10/23 given recurrent fevers.  Bronch cultures 10/17 with Staph epi.  Cryptococcal Ag negative 10/23.    - Monthly Coccidioides Ag x12 months post-transplant per ID (urine and serum negative 10/17), due 11/17 (ordered)     Recurring fevers:  Fevers post-op, Tmax 101.7 POD #1.  Febrile with worsening leukocytosis again 10/25, generally persisting.  - Trach sputum culture (10/25) with GPC, following  - Pleural cultures (10/25) with 3+ WBC, following  - Bronch cultures (10/26) with yeast, following  - LP today, primary team to coordinate with appropriate team  - Meropenem (10/28) for empiric ABX coverage given recurrent fevers     Disseminated Candida:  Noted on 10/20 and 10/22.  BDG fungitell positive (399) on 10/20.  Respiratory cultures with persistent Candida albicans.  TC 10/23 without evidence of endocarditis.   Ophthalmology consult 10/24 with benign dilated fundoscopic exam.  Candida empyema also noted 10/25, chest tubes inadvertently removed by CVTS on 10/28 (plan was to continue at least one for a few days longer given positive cultures).  - Repeat blood cultures (10/23, 10/25) NGTD  - Fluconazole (10/26, prior micafungin 10/22-10/27), duration TBD pending clinical course     HSV: Chronic intermittent active infection pre-transplant with recent HSV infection: crusted lesions throughout left side of jaw, s/p 10 day treatment course of ACV through 10/9.  HSV PCR blood negative 10/17.  - ACV ppx as above (started POD #1 instead of POD #8 given HSV history and location)     Hypogammaglobulinemia: IgG previously low at 364 (9/7).  Noted at 265 at time of transplant, s/p IVIG with premedication 10/21.    - Repeat IgG 11/17 (ordered)     Positive cross match: Note that he received two doses of rituximab in June, which is likely contributing to cross match  result.  DSA negative 10/17, repeat DSA 10/23 invalid with recent IVIG (as above).  - Repeat DSA 11/1 (ordered), monitoring q2 weeks     PHS risk criteria donor:  Additional labs required post-transplant (between 4-8 weeks post-op): Hepatitis B, Hepatitis C, and HIV by VISHAL (ordered 11/15).     SALTY: Noted pre-transplant.  Home CPAP 6-12 cm H2O.  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  MRI (10/23) with multifocal subacute infarcts within both cerebral hemispheres and left cerebellum.  EEG without seizures 10/22, ammonia normal.  DDx include surgery v embolic v infectious (see above regarding ID work up).  Heparin drip started 10/23.   Repeat stroke code 10/25 with marked decrease in responsiveness with sedation wean.  Pupils not equal (3 mm R, 2 mm L with extropia) and gag reflex initially absent.  CTA head without obvious new pathology, MRI brain (with and without contrast) primarily revealing for infarct, low likelihood of PRES.  VEEG per neuro with severe diffuse encephalopathy.  Etiology of CVA likely 2/2 afib, PFO, or perioperative.  - Heparin management per neurology and primary  - ID workup and management as above     Afib with RVR: Noted 10/18, started on amiodarone drip and transitioned to PO 10/21.  Currently in SR.  Heparin drip started 10/23 as above.  Transitioned to 200 mg daily amiodarone dosing on 10/29 (anticipate 4 week course).     BRENNAN, Improving: Baseline creatinine 0.7.  BRENNAN noted POD #1, UO also downtrending.  Improving with aggressive diuresis.     Elevated LFTs, Resolved: Shock liver post-op.  Initial ALT//230 evening of surgery, then with marked increase to 1550/1246  "POD #1.  Now resolved (10/28).     GI bleed, Resolved: Attributed to mechanical trauma.  Hemoglobin dropped to 6.6 10/22, s/p 2 units pRBC.  OG tube with bloody output, EGD 10/23 noted NJ/OG tube trauma with scant oozing.  IV PPI BID.  Hemoglobin now stable.     We appreciate the excellent care provided by the CVICU and CVTS teams.  Recommendations communicated via in person rounding and this note.  Will continue to follow along closely, please do not hesitate to call with any questions or concerns.     Patient discussed with Dr. Pereira.    Renetta Holloway, DNP, APRN, CNP  Inpatient Nurse Practitioner  Pulmonary CF/Transplant     Subjective & Interval History:     Remains intubated with full ventilator support    Review of Systems:     ROS as above, otherwise severely limited by intubation and encephalopathy.  Unsure if pt. did PS yesterday, not documented or noted.  Continued bloody drainage from right ear despite ear packing by ENT and prn Afrin.  Per primary team and nursing pt. did move his feet to touch this morning on exam.    Physical Exam:     Vital signs:  Temp: 99.5  F (37.5  C) Temp src: Bladder BP: 113/70 Pulse: 80   Resp: 26 SpO2: 97 % O2 Device: Mechanical Ventilator Oxygen Delivery: 15 LPM Height: 162.6 cm (5' 4\") Weight: 68.6 kg (151 lb 3.8 oz)  I/O:     Intake/Output Summary (Last 24 hours) at 10/29/2021 0717  Last data filed at 10/29/2021 0600  Gross per 24 hour   Intake 3099.86 ml   Output 3428 ml   Net -328.14 ml     Constitutional: Lying on left side, LP in progress, in mild distress.   HEENT: Trach in place.  PULM: Non-labored breathing on full vent support.  CV:  Trace BUE edema.   ABD: Rectal tube in place with brown output.  PEG/J tube not visualized.  MSK: + muscle wasting.   NEURO: Not responsive to verbal stimuli.   SKIN: Warm, dry.  No rash on limited exam.   PSYCH: Mood calm.     Lines, Drains, and Devices:  Peripheral IV 10/22/21 Left;Posterior Lower forearm (Active)   Site Assessment WDL " 10/29/21 0400   Line Status Saline locked;Checked every 1-2 hour 10/29/21 0400   Dressing Intervention New dressing  10/27/21 2000   Phlebitis Scale 0-->no symptoms 10/29/21 0400   Infiltration Scale 0 10/29/21 0400   Number of days: 7       Peripheral IV 10/25/21 Right;Anterior;Lateral Lower forearm (Active)   Site Assessment WDL 10/29/21 0400   Line Status Saline locked;Checked every 1-2 hour 10/29/21 0400   Dressing Intervention New dressing  10/27/21 2000   Phlebitis Scale 0-->no symptoms 10/29/21 0400   Infiltration Scale 0 10/29/21 0400   Infiltration Site Treatment Method  None 10/27/21 1600   If infiltrated, was a vesicant infusing? No 10/27/21 1600   Number of days: 4       Peripheral IV 10/25/21 Anterior;Distal;Right Upper arm (Active)   Site Assessment WDL 10/29/21 0400   Line Status Saline locked;Checked every 1-2 hour 10/29/21 0400   Dressing Intervention Dressing reinforced 10/27/21 1600   Phlebitis Scale 0-->no symptoms 10/29/21 0400   Infiltration Scale 0 10/29/21 0400   Infiltration Site Treatment Method  None 10/27/21 0400   Number of days: 4     Data:     LABS    CMP:   Recent Labs   Lab 10/29/21  0627 10/29/21  0542 10/29/21  0412 10/29/21  0309 10/28/21  0607 10/28/21  0530 10/27/21  2220 10/27/21  2130 10/27/21  1806 10/27/21  1635 10/27/21  0621 10/27/21  0523 10/26/21  1717 10/26/21  1452 10/26/21  0931 10/26/21  0637   NA  --  139  --   --   --  143  --  147*  --  148*   < > 152*   < > 151*   < > 148*   POTASSIUM  --  4.6  --   --   --  5.0  --   --   --  5.2  --  4.4   < > 5.0   < > 4.1   CHLORIDE  --  107  --   --   --  110*  --   --   --   --   --  120*  --  121*   < > 117*   CO2  --  26  --   --   --  30  --   --   --   --   --  27  --  25   < > 25   ANIONGAP  --  6  --   --   --  3  --   --   --   --   --  5  --  5   < > 6   * 128* 136* 135*   < > 169*   < >  --    < >  --    < > 208*   < > 244*   < > 206*   BUN  --  72*  --   --   --  60*  --   --   --   --   --  60*  --  63*    < > 62*   CR  --  1.23  --   --   --  1.04  --   --   --   --   --  0.90  --  1.16   < > 1.11   GFRESTIMATED  --  65  --   --   --  80  --   --   --   --   --  >90  --  70   < > 74   ERIK  --  9.0  --   --   --  9.0  --   --   --   --   --  9.1  --  8.6   < > 8.9   MAG  --  1.8  --   --   --  1.8  --   --   --   --   --  2.0  --  1.8   < > 1.9   PHOS  --  4.9*  --   --   --  4.2  --   --   --  3.2  --  2.6   < > 3.6   < > 3.0   PROTTOTAL  --  5.8*  --   --   --  5.9*  --   --   --   --   --  6.2*  --   --   --  5.8*   ALBUMIN  --  1.0*  --   --   --  1.7*  --   --   --   --   --  1.8*  --   --   --  1.8*   BILITOTAL  --  0.3  --   --   --  0.7  --   --   --   --   --  0.3  --   --   --  0.3   ALKPHOS  --  106  --   --   --  130  --   --   --   --   --  160*  --   --   --  160*   AST  --  20  --   --   --  21  --   --   --   --   --  27  --   --   --  21   ALT  --  35  --   --   --  47  --   --   --   --   --  71*  --   --   --  71*    < > = values in this interval not displayed.     CBC:   Recent Labs   Lab 10/29/21  0542 10/28/21  0530 10/27/21  0523 10/26/21  1020   WBC 38.8* 41.0* 37.3* 35.8*   RBC 3.04* 3.38* 3.58* 3.28*   HGB 9.4* 10.3* 10.8* 9.9*   HCT 30.4* 33.2* 35.3* 32.0*    98 99 98   MCH 30.9 30.5 30.2 30.2   MCHC 30.9* 31.0* 30.6* 30.9*   RDW 16.0* 15.9* 16.0* 16.2*    246 273 341       INR:   Recent Labs   Lab 10/29/21  0542 10/28/21  0530 10/27/21  0523 10/26/21  0637   INR 1.13 1.13 1.16* 1.26*       Glucose:   Recent Labs   Lab 10/29/21  0627 10/29/21  0542 10/29/21  0412 10/29/21  0309 10/29/21  0204 10/29/21  0124   * 128* 136* 135* 133* 137*       Blood Gas:   Recent Labs   Lab 10/29/21  0502 10/28/21  0502 10/27/21  0335 10/26/21  0522 10/25/21  2113 10/24/21  0010 10/23/21  2108 10/23/21  2016 10/23/21  1543   PHV  --   --   --   --  7.47*  --  7.42  --  7.44*   PCO2V  --   --   --   --  36*  --  45  --  46   PO2V  --   --   --   --  78*  --  36  --  36   HCO3V  --   --   --    --  27  --  29*  --  31*   LORI  --   --   --   --  2.9*  --  4.1*  --  6.5*   O2PER 45 50 60   < > 55   < > 50   < > 50  50    < > = values in this interval not displayed.       Culture Data No results for input(s): CULT in the last 168 hours.    Virology Data:   Lab Results   Component Value Date    FLUAH1 Negative 10/17/2021    FLUAH3 Negative 10/17/2021    FB3526 Negative 10/17/2021    IFLUB Negative 10/17/2021    RSVA Negative 10/17/2021    RSVB Negative 10/17/2021    PIV1 Negative 10/17/2021    PIV2 Negative 10/17/2021    PIV3 Negative 10/17/2021    HMPV Negative 10/17/2021    HRVS Negative 10/17/2021    ADVBE Negative 10/17/2021    ADVC Negative 10/17/2021       Historical CMV results (last 3 of prior testing):  Lab Results   Component Value Date    CMVQNT Not Detected 10/26/2021     No results found for: CMVLOG    Urine Studies    Recent Labs   Lab Test 10/25/21  1507 10/22/21  1641   URINEPH 5.5 7.5*   NITRITE Negative Negative   LEUKEST Negative Negative   WBCU 2 3       Most Recent [facility] Pulmonary Function Testing (FVC/FEV1 only)  No results found for: 20002  No results found for: 20003  No results found for: 20015  No results found for: 20016    IMAGING    Recent Results (from the past 48 hour(s))   XR Chest Port 1 View    Narrative    EXAMINATION: XR CHEST PORT 1 VIEW, 10/27/2021 5:00 PM    INDICATION: Chest tube removal    COMPARISON: Chest x-ray 10/27/2021 at 12:55 AM.    FINDINGS: Single portable 40 degree upright AP radiograph of the  chest. ET tube projects at the mid thoracic trachea. 2 enteric tubes  course below the left hemidiaphragm, tips are now within view.  Interval removal of mediastinal drain. Stable positioning of bilateral  basilar chest tubes.    Postsurgical changes of bilateral lung transplant. Trachea is midline.  The cardiomediastinal silhouette is stably enlarged. Asymmetric  elevation of the right hemidiaphragm. No appreciable pneumothorax.  Small left pleural effusion.  Bibasilar atelectasis. Unchanged  interstitial opacities.    Partially visualized upper abdomen is unremarkable. No acute osseous  abnormality. Soft tissue is within normal limits.         Impression    IMPRESSION:  1. Interval removal of pericardial drain. No appreciable pneumothorax.  Remainder of support devices are stable.  2. Postsurgical changes of bilateral lung transplant. Small left  pleural effusion. Bibasilar atelectasis. Decreased interstitial  opacities.    I have personally reviewed the examination and initial interpretation  and I agree with the findings.    ARIES DELGADO MD         SYSTEM ID:  [id number]   XR Chest Port 1 View    Narrative    EXAM: XR CHEST PORT 1 VIEW  10/28/2021 1:00 AM     HISTORY:  s/p lung transplant       COMPARISON:  10/27/2021    TECHNIQUE: Portable AP radiograph of the chest.    FINDINGS:   Endotracheal tube tip projects over the mid-low trachea. Enteric tube  and feeding tube course through the stomach and below the field of  view. Stable bilateral pleural chest tubes.     Postsurgical changes of bilateral lung transplant. The trachea is  midline. Stable heart normal heart size.  Unchanged streaky perihilar  and bibasilar opacities. Mild pulmonary vascular congestion is  present. There is no evidence of associated pulmonary edema. Small  left pleural effusion. No pneumothorax.     There are no osseous abnormalities. No acute abnormality in the  visualized upper abdomen.      Impression    IMPRESSION: In this patient status post bilateral lung transplant:  1. No significant interval change.  2. Stable small left pleural effusion and postoperative atelectasis.  3. Mild pulmonary vascular congestion.       I have personally reviewed the examination and initial interpretation  and I agree with the findings.    FITO PERALES MD         SYSTEM ID:  [id number]   XR Chest Port 1 View    Narrative    Exam: XR CHEST PORT 1 VIEW, 10/28/2021 3:04 PM    Indication: chest tubes  removed    Comparison: Same-day radiograph from 0:48 AM    Findings:   Single portable AP view of the chest. Endotracheal tube 4.5 cm from  the shiv. 2 enteric tubes with the tip below the field of view.  Clamshell sternotomy sutures. Skin staples transverse in the lower  chest. Mediastinal and lower right chest surgical clips. Interval  removal of bilateral chest tubes.    Midline trachea. No pneumothorax. Small left and trace right pleural  effusions. Bibasilar atelectasis. Mild pulmonary congestion.  Unremarkable cardiac silhouette and mediastinum. No acute osseous  abnormalities.      Impression    Impression:   1. Removal of bilateral chest tubes without pneumothorax.  2. Small left and trace right pleural effusions.  3. Mild pulmonary vascular congestion.    I have personally reviewed the examination and initial interpretation  and I agree with the findings.    DAVIN ANGUIANO MD         SYSTEM ID:  [id number]

## 2021-10-29 NOTE — ANESTHESIA POSTPROCEDURE EVALUATION
Patient: Edson Thornton    Procedure: Procedure(s):  Percutaneous Tracheostomy,  Percutaneous gastrojejunostomy       Diagnosis:Ventilator dependence (H) [Z99.11]  Failure to thrive in adult [R62.7]  Diagnosis Additional Information: No value filed.    Anesthesia Type:  General    Note:  Disposition: Inpatient   Postop Pain Control: Uneventful            Sign Out: Well controlled pain   PONV: No   Neuro/Psych: Uneventful            Sign Out: Acceptable/Baseline neuro status   Airway/Respiratory: Uneventful            Sign Out: AIRWAY IN SITU/Resp. Support               Airway in situ/Resp. Support: ETT                 Reason: Planned Pre-op   CV/Hemodynamics: Uneventful            Sign Out: Acceptable CV status; No obvious hypovolemia; No obvious fluid overload   Other NRE:    DID A NON-ROUTINE EVENT OCCUR?            Last vitals:  Vitals Value Taken Time   BP 87/53 10/29/21 1400   Temp 37.7  C (99.86  F) 10/29/21 1459   Pulse 82 10/29/21 1459   Resp 26 10/29/21 1300   SpO2 95 % 10/29/21 1459   Vitals shown include unvalidated device data.    Electronically Signed By: Sebastian Alonso MD  October 29, 2021  3:00 PM

## 2021-10-29 NOTE — PROGRESS NOTES
[facility]  Transplant Infectious Disease Progress Note     Patient:  Edson Thornton, Date of birth 1965, Medical record number [id number]  Date of Visit:  10/29/2021         Assessment and Recommendations:   Recommendations:  - What is being treated with empiric meropenem is unclear, but since his fever and leukocytosis may be improving on 24 hours of that antibiotic, would continue it temporarily to give a ~ 72 hour trial over the weekend to see if a definitive response ensues.  - Continue fluconazole 400 mg daily through at least 11/8/21 (two weeks from the date of the PICC line's removal; for the candidemia and Candida empyema).  Monitor tacrolimus levels.  - Continue TMP-SMX and acyclovir prophylaxis.  - Would definitely not add additional antimicrobials now (in the absence of overt sepsis.)  - Await the 10/29/21 CSF culture results.  So far, the CSF is not suggestive of an infection (there is no RBC-adjusted pleocytosis).    Transplant ID will follow with you but will not see over the weekend unless paged.    Prem Soares MD  Pager [phone]    Assessment:  A 56 year old gentleman immunosuppressed (tacrolimus, mycophenolate, prednisone) s/p a 10/16/21 bilateral lung transplant for NSIP / ILD with rheumatoid arthritis who also has a history of bronchiectasis, moderate PH, SALTY, chronic HSV infection, hypogammaglobulinemia, steroid-induced diabetes, hypothyroidism, hypertension, hyperlipidemia, duodenal anomaly, anxiety, and depression.  He was admitted to [facility] on 9/5/21 for acute on chronic respiratory failure due to an ILD exacerbation and underwent lung transplant on 10/16/21.  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  He had fevers on 10/19/21 and on 10/20/21 and a corresponding 10/20/21 blood culture grew pan-susceptible Candida albicans.  He is stable but ventilator dependent (on low vent settings, s/p 10/29/21 tracheostomy) with a multifactorial  encephalopathy and persistent leukocytosis.    ID issues:    - Candidemia in a single 10/20/21 blood culture:  Although only one blood culture (out of six post-transplant to that point) grew Candida, that positive blood culture did correspond to a significant fever spike on 10/20/21 late PM and he had a strongly positive 10/20/21 Fungitell assay, so this very possibly represented a true candidemia and needs to be treated as such.  He is at risk for candidemia due to the presence of multiple lines.  There is no visible site of organ involvement or of hepatosplenic candidiasis on the 10/22/21 and 10/25/21 chest / abdomen CT scans (except perhaps the left pleural effusion).  A 10/23/21 TTE showed no evidence of valvular vegetations.   A 10/24/21 Ophthalmology Consult examination was benign and 10/22/21 x 2, 10/23/21 x 2, and 10/25/21 x 3 surveillance blood cultures are all without growth to date.  Empiric therapy with IV micafungin at Candida treatment dose 100 mg daily has been appropriate to give a two week course through 11/8/21 from the date of the removal of the PICC line on 10/25/21 (with other lines removed previously), but since the C albicans is quite susceptible to fluconazole (TESSIE 0.5) and his LFTs have normalized, finishing out the treatment course with fluconazole on 10/26/21 would be more elegant -- Pulmonary Transplant Consult service agrees and will adjust the tacrolimus dosing (as needed) accordingly.  If surveillance blood cultures are still without growth, a new central line can be placed after a 48 hour line holiday, on 10/27/21.    - Isolation of yeast from 10/25/21 left pleural fluid:  We await the associated culture, fluid cell count (if sent), and cytology, but the candidemia or the transplant itself may possibly have seeded the left pleural space, in which case a longer course of antifungal therapy may be necessary instead of a straight two weeks.    - New, acute, cryptogenic 10/25/21  leukocytosis / fever:  Etiology is unclear -- perhaps due to the left fungal empyema.  This fever is unlikely due to the candidemia itself, since his blood cultures cleared quickly as of 10/22/21.  Other fever evaluation to date has been negative.  A 10/29/21 AM procalcitonin was quite low at 0.38 (with only mild renal insufficiency, creatinine 1.23).  Despite absence of clear indication, empiric meropenem was added on 10/28/21 evening and, so far, some possible response seems to be evident, with a lowered fever curve and slightly improved leukocytosis:  Given that result, will continue the meropenem temporarily (for ~ 72 hours) to give a more definitive trial.  Would definitely not add any other additional antimicrobials at this time, since that would only confuse the picture.    - Sputum Gram stain from 10/25/21 showing Gram positive cocci:  Gram positive cocci in sputum may very well be normal jean marie (coagualse negative Staph) in the absence of other new indicators of a new pneumonia, so would not treat this while awaiting the culture result.  The 10/26/21 chest x-ray shows no new consolidations consistent with pneumonia (and actually seems to be improving).    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  The RBC adjusted CSF WBC count is about zero.  The Bio[employer] CSF PCR panel is negative for herpesviral and other pathogens and a CSF CRAG was negative.    Old ID issues:  - Concern for possible Strongyloides exposure (because a [place]  and in multiple countries with endemic Strongyloides) pre-transplant so received ivermectin dose on 9/17/21.  - Plan for monthly Coccidioides Ag x12 months post-transplant per ID (urine and serum negative 10/17), next due  11/17 (not yet ordered).  Has calcified granulomas on chest CT.  Has a history of residing in a [place]id[place] endemic areas [place] and[place]or Histoplasma endemic area ([place]   - Pre-transplant indeterminate Quantiferon assay:  Assessed 9/16/21 by Transplant ID.  Had been in the [employer] and was stationed in many states including [facility] and [facility]. Also was stationed in [facility] and [facility] and maybe [place].  He was tested with tuberculin skin test on numerous occasions before he became immunocompromised and was never positive. In addition, he was tested with QuantIFERON on 5/11/2021 prior to, or within 24 hr of receiving 10 mg/day of prednisone, the QuantiFERON was negative.  Deemed to no have LTBI.  - Pneumonia treated with a short course of antibiotics in [place].      Other ID issues:  - QTc interval:  466 msec on 10/22/21 EKG.  - Bacterial prophylaxis:  None indicated, has been on post-transplant ceftazidime.  - Pneumocystis prophylaxis:  On TMP-SMX prior to transplant, initially held on admission for BRENNAN, resumed 10/27/21.  - Viral serostatus & prophylaxis:  CMV negative.  Chronic history of intermittent active HSV oral outbreaks, on acyclovir.  EBV / VZV seropositive.  - Fungal prophylaxis:  On treatment micafungin.  - Immunization status:  Not presently relevant.  Did not receive influenza, pneumococcal, or Covid-19 vaccines pre-transplant.  - Gamma globulin status:  History of hypogammaglobulinemia.  Received IVIG on 10/21/21.  - Isolation status: Routine.        Interval History:   Mr. Thornton is less febrile (T max 100.4 degrees F) over the past 24 hours (with ongoing fevers since 10/24/21 midnight) and his peripheral leukocytosis seems to have plateaued this AM down to 38.8 (from 41.0 yesterday, versus 20 - 23K between 10/22 - 24/21).  Empiric meropenem was added yesterday evening (by Pulmonary Transplant / MICU teams) because of the cryptogenic fever and he also remains on fluconazole (since  10/26/21, for candidemia and left Candida empyema) plus plus TMP-SMX (resumed 10/25/21) and acyclovir prophylaxis (since 10/16/21 transplant).  Because of ventilator dependence, a tracheostomy and PEG J tube were placed today by Thoracic Surgery.  A preparative bronchoscopy was performed at the start of the tracheostomy procedure showing intact anastomoses and unremarkable moderate white thick secretions.  He is on stable ventilator low settings (FiO2 0.45, PEEP 5).  He is on full tube feeds.  He is off sedation but remains essentially completely non-responsive but reportedly did withdraw his feet to stimulation earlier today.  He had some bloody right ear drainage overnight which has been packed by ENT.  His bibasilar chest tubes were removed yesterday PM.  There have been no other acute events.  He has beendiuresed although his weight remains above his admission wieght.  The right arm PICC was removed 10/25/21 PM and a central line holiday continues.    A lumbar puncture was performed this early afternoon with an opening pressure of 9 cm H2O, a cell count indicative of a traumatic tap (pink in tube 4, cloudy, WBC 80 (94% N, 3% L, 4% M), RBC 9,000), glucose 83, protein 97, Biofire menigioencephalitis PCR panel negative, CRAG negative, and other cultures and studies pending.  The RBC-adjusted CSF WBC count is about zero.  There is no other new microbiology today.  A 10/25/21 left pleural fluid culture grew 2+ C albicans.  The 10/20/21 blood culture and 10/20/21 and 10/22/21 ETT sputum cultures also all grew Candida albicans, with the blood culture isolate pan-susceptible (micafungin TESSIE <0.03, fluconazole TESSIE 0.5).  The other 10/20/21 blood culture as well as 10/22/21 x 2, 10/23/21 x 2, and 10/25/21 x 3 surveillance blood cultures all negative.  The 10/23/21 serum cryptococcal antigen assay was negative.  10/22/21 and 10/25/21 abdominal CT scans showed no evidence of hepatosplenic candidiasis or other intrabdominal  infection.  The 10/25/21 chest CT and 10/26/21 chest x-ray show improving bilateral infiltrates.  10/23 TTE showed no vegetations.  A 10/24/21 Ophthalmology Consult examination was benign.        History of Infectious Disease Illness (copied from the 10/23/21 Transplant ID Consult Note):   A 56 year old gentleman immunosuppressed (tacrolimus, mycophenolate, prednisone) s/p a 10/16/21 bilateral lung transplant for NSIP / ILD with rheumatoid arthritis who also has a history of bronchiectasis, moderate PH, SALTY, chronic HSV infection, hypogammaglobulinemia, steroid-induced diabetes, hypothyroidism, hypertension, hyperlipidemia, duodenal anomaly, anxiety, and depression.  He was admitted to [facility] on 9/5/21 for acute on chronic respiratory failure due to an ILD exacerbation and underwent lung transplant on 10/16/21.  The transplant surgery was complicated by early low cardiac index and immediate post-operative PGD as well as later atrial fibrillation with RVR on 10/18/21 as well as BRENNAN.  He is now day 7 post transplant and remains intubated in the CVICU on inhaled nitrous oxide with compromised capacity to wean off sedation.  He had immediate post-operative fevers for ~ 24 hours, then was afebrile for ~ 1.5 days, then respiked fevers up to 101.3 degrees F on 10/19/21 late PM and 10/20/21 late PM.  He had another low grade fever to 100.9 degrees on 10/22/21 PM.  He has been on empiric ceftazidime since transplant, as well as acyclovir prophylaxis (and was on TMP-SMX prophylaxis prior to transplant).  His post-operative peripheral WBC peaked at 44.5 on 10/18/21, fell to 27.4 by 10/20/21, and is now at 21.3.  Blood cultures x 4 obtained on 10/17/21 were negative, but one of two blood cultures peripherally drawn on 10/20/21 early AM (with the fever spike) grew yeast (ID pending) on 10/22/21 at 52 hours of incubation; the other peripherally drawn blood culture lacks growth to date.  Surveillance blood cultures x 2 from 10/22/21  afternoon are without growth to date.  A Fungitell assay was positive at 399 on 10/20/21.  Of note, respiratory (broncial washing, ETT sputum) cultures obtained 10/17/21 and 10/20/21 have grown Candida albicans along with Staph epidermidis and other normal jean marie.  IV micafungin 100 mg daily was instituted on 10/22/21 late AM.  A 10/22/21 chest / abdominal CT scan did not reveal any likely source.  He has a history of potential Coccidioides exposure, but a 10/17/21 Coccidioides antibody assay was negative and a Coccidioides antigen assay is pending.  This morning he had some blood in his OG tube and underwent upper endoscopy.  He remains intubated but is opening his eyes to auditory stimulation and tracking today, although not moving to request.  He is hemodynamically stable.    Exposure History:  Had been in the [employer] and was stationed in many states including [facility] and [facility]. Also was stationed in [facility] and [facility] and maybe [place].  Has a history of residing in a [place]occidioides endemic areas [place] and/or Histoplasma endemic area ([place]  Extensive travel as a  within the [place].      Transplants:  10/16/2021 (Lung), Postoperative day:  13.  Coordinator Kassandra Estrada    Review of Systems:  ROS is unobtainable because he is intubated, sedated, and unresponsive on the ventilator.    Past Medical History:   Diagnosis Date     BRENNAN (acute kidney injury) (H) 10/17/2021     Anxiety      Depression      HLD (hyperlipidemia)      HTN (hypertension)      Hypothyroidism      ILD (interstitial lung disease) (H)      SALTY on CPAP      Oxygen dependent     BL 4L since ~6/2021     Rheumatoid arthritis (H)     signs ~5/2020, dx 5/2021     S/P lung transplant (H) 10/16/2021     Shock liver 10/17/2021     Steroid-induced hyperglycemia      Traction bronchiectasis (H)      Past Surgical History:   Procedure Laterality Date     COLONOSCOPY W/ BIOPSIES AND POLYPECTOMY  07/21/2020     CV CORONARY ANGIOGRAM  N/A 9/8/2021    Procedure: Coronary Angiogram with possible intervention;  Surgeon: Jovon Bullock MD;  Location:  HEART CARDIAC CATH LAB     CV RIGHT HEART CATH MEASUREMENTS RECORDED N/A 9/8/2021    Procedure: Right Heart Cath;  Surgeon: Jovon Bullock MD;  Location:  HEART CARDIAC CATH LAB     ESOPHAGOSCOPY, GASTROSCOPY, DUODENOSCOPY (EGD), COMBINED N/A 10/23/2021    Procedure: ESOPHAGOGASTRODUODENOSCOPY (EGD);  Surgeon: Miquel Pisano MD;  Location:  GI     EXTRACTION(S) DENTAL N/A 9/22/2021    Procedure: EXTRACTION tooth #19;  Surgeon: Deepak Tobin DDS;  Location: [facility] OR     HERNIA REPAIR       right acl       TRANSPLANT LUNG RECIPIENT SINGLE X2 Bilateral 10/16/2021    Procedure: TRANSPLANT, LUNG, RECIPIENT, BILATERAL, Bronchoscopy, on-pump perfusion, bilateral clamshell sternotomy;  Surgeon: Yanick Corral MD;  Location:  OR     XR ACROMIOCLAVICULAR JOINT BILATERAL       Family History   Problem Relation Age of Onset     Diabetes Type 1 Mother      Heart Disease Mother      Chronic Obstructive Pulmonary Disease Mother      Rheumatoid Arthritis Father      Emphysema Paternal Grandfather      Social History     Tobacco Use     Smoking status: Never Smoker     Smokeless tobacco: Never Used   Substance Use Topics     Alcohol use: Never     Drug use: Never       There is no immunization history on file for this patient.    Patient Active Problem List   Diagnosis     S/P lung transplant (H)     BRENNAN (acute kidney injury) (H)     Shock liver          Current Medications & Allergies:       acetaminophen  975 mg Oral or Feeding Tube Q8H MARKY     acetylcysteine  2 mL Nebulization 4x Daily     acyclovir  400 mg Oral or NG Tube BID     amiodarone  200 mg Oral or Feeding Tube Daily     amLODIPine  10 mg Oral or Feeding Tube Daily     bumetanide  2 mg Intravenous TID     calcium carbonate 600 mg-vitamin D 400 units  1 tablet Oral or Feeding Tube BID w/meals     carvedilol  12.5  mg Oral or Feeding Tube BID     [START ON 10/30/2021] fluconazole  400 mg Oral or Feeding Tube Daily     hydrALAZINE  100 mg Oral or Feeding Tube Q6H     insulin glargine  15 Units Subcutaneous QAM AC     isosorbide dinitrate  40 mg Oral or Feeding Tube TID     levalbuterol  1.25 mg Nebulization 4x Daily     levothyroxine  25 mcg Oral Daily     meropenem  1 g Intravenous Q8H     multivitamins w/minerals  15 mL Per Feeding Tube Daily     mycophenolate  1,500 mg Oral or J tube BID     nystatin  1,000,000 Units Swish & Swallow 4x Daily     ofloxacin  5 drop Right Ear BID     pantoprazole  40 mg Per Feeding Tube BID AC     potassium chloride  20 mEq Oral Daily     [START ON 10/31/2021] predniSONE  15 mg Oral QAM    And     [START ON 10/31/2021] predniSONE  12.5 mg Oral QPM     predniSONE  15 mg Per Feeding Tube BID     protein modular  1 packet Per Feeding Tube TID     rosuvastatin  10 mg Oral or Feeding Tube Daily     senna-docusate  1 tablet Oral or Feeding Tube BID     sodium chloride (PF)  3 mL Intracatheter Q8H     [START ON 10/30/2021] sulfamethoxazole-trimethoprim  10 mL Oral or J tube Daily    Or     [START ON 10/30/2021] sulfamethoxazole-trimethoprim  1 tablet Oral or Feeding Tube Daily     tacrolimus  2 mg Per G Tube BID IS     Infusions/Drips:      dextrose       dextrose Stopped (10/24/21 1008)     insulin regular 6 Units/hr (10/29/21 1651)     BETA BLOCKER NOT PRESCRIBED       No Known Allergies         Physical Exam:     Patient Vitals for the past 24 hrs:   BP Temp Temp src Pulse Resp SpO2 Weight   10/29/21 1700 -- -- -- -- 30 -- --   10/29/21 1600 -- -- -- -- 30 -- --   10/29/21 1520 -- -- -- 87 -- 95 % --   10/29/21 1500 -- -- -- -- 28 -- --   10/29/21 1400 -- -- -- -- 28 -- --   10/29/21 1300 -- -- -- -- 26 -- --   10/29/21 1231 109/71 99.3  F (37.4  C) -- 89 -- 94 % --   10/29/21 1200 -- -- -- -- 26 -- --   10/29/21 1100 117/73 98.8  F (37.1  C) -- 78 24 94 % --   10/29/21 0730 -- 99.7  F (37.6  C) --  82 25 96 % --   10/29/21 0700 (!) 88/69 99.5  F (37.5  C) -- 78 25 96 % --   10/29/21 0600 113/70 99.5  F (37.5  C) -- 80 26 97 % --   10/29/21 0503 -- -- -- -- -- 98 % --   10/29/21 0500 104/71 99.7  F (37.6  C) -- 75 23 90 % --   10/29/21 0400 103/71 99.5  F (37.5  C) -- 75 25 97 % --   10/29/21 0300 100/68 99.3  F (37.4  C) -- 75 26 96 % --   10/29/21 0200 93/52 100.2  F (37.9  C) -- 75 25 99 % --   10/29/21 0153 -- -- -- -- -- 98 % --   10/29/21 0100 108/66 100.2  F (37.9  C) -- 85 24 98 % --   10/29/21 0000 105/63 100.4  F (38  C) Bladder 87 25 98 % 68.6 kg (151 lb 3.8 oz)   10/28/21 2300 109/70 (!) 100.8  F (38.2  C) -- 77 28 97 % --   10/28/21 2200 101/71 (!) 100.6  F (38.1  C) -- 87 (!) 35 90 % --   10/28/21 2100 (!) 85/48 100.4  F (38  C) -- 83 28 94 % --   10/28/21 2055 -- -- -- -- -- 94 % --   10/28/21 2000 96/74 100  F (37.8  C) Bladder 96 23 97 % --   10/28/21 1900 119/66 100.2  F (37.9  C) -- 88 26 93 % --   10/28/21 1800 124/76 100.2  F (37.9  C) -- 82 28 96 % --     Ranges for vital signs over the past 24 hours:    Temp:  [98.8  F (37.1  C)-100.8  F (38.2  C)] 99.3  F (37.4  C)  Pulse:  [75-96] 87  Resp:  [23-35] 30  BP: ()/(48-76) 109/71  FiO2 (%):  [45 %] 45 %  SpO2:  [90 %-99 %] 95 %  Vitals:    10/27/21 0400 10/28/21 0100 10/29/21 0000   Weight: 71.8 kg (158 lb 4.6 oz) 68.6 kg (151 lb 3.8 oz) 68.6 kg (151 lb 3.8 oz)     Intake/Output Summary (Last 24 hours) at 10/29/2021 1708  Last data filed at 10/29/2021 1700  Gross per 24 hour   Intake 2462.23 ml   Output 2770 ml   Net -307.77 ml     Physical Examination:  GENERAL:  Intubated, non-responsive, WDWN, 56 year old man in NAD on the ventilator.  HEAD:  NCAT.  EYES:  PERRL, anicteric sclerae.  ENT:  No otorrhea.  Left nares NJ tube.  No anterior oral lesion.  NECK:  Supple, new tracheostomy.  LYMPH:  No cervical lymphadenopathy.  LUNGS:  Decreased posterior bibasilar breath sounds, anterior clear to auscultation bilaterally.  Bilateral  inferior chest tubes.  CHEST:  Wound VAC on clamshell incision.  Tube sites dressed.  CARDIOVASCULAR:  RRR, without murmur.  ABDOMEN: Bowel sounds present, soft, nontender by monitor to palpation, new GJ tube site dressed.  :  Watson with leena urine.  EXTREMITIES:  Distally warm, no edema.  SKIN:  No acute rash or lesion.  Removed right arm PICC line site lacks inflammation.  Peripheral IV present without inflammation.  NEUROLOGIC:  Unresponsive, does not move to request.         Laboratory Data:     No results found for: ACD4    Inflammatory Markers    Recent Labs   Lab Test 10/29/21  0542 10/14/21  0943 09/07/21  1324 09/06/21  0633   SED 87*  --   --   --    CRP 53.0* 23.0*   < >  --    PSA  --   --   --  0.56    < > = values in this interval not displayed.     Immune Globulin Studies     Recent Labs   Lab Test 10/15/21  0907 09/07/21  1324 09/07/21  1100   * 363*  363*  --    IGM  --  51  --    IGE  --   --  83   IGA  --  103  --      Metabolic Studies       Recent Labs   Lab Test 10/29/21  1619 10/29/21  1106 10/29/21  1046 10/29/21  0627 10/29/21  0542 10/28/21  0607 10/28/21  0530 10/26/21  1717 10/26/21  1452 10/23/21  2108 10/23/21  2016 10/22/21  1602 10/22/21  1601 10/22/21  0959 10/22/21  0958 10/20/21  1004 10/20/21  0957 09/07/21  1100 09/07/21  0928   NA  --   --   --   --  139  --  143   < > 151*   < >  --    < > 147*   < > 147*   < > 146*   < >  --    POTASSIUM  --   --   --   --  4.6  --  5.0   < > 5.0   < >  --    < > 3.6   < > 3.5   < > 4.1   < >  --    CHLORIDE  --   --   --   --  107  --  110*   < > 121*   < >  --    < > 109   < > 106   < > 107   < >  --    CO2  --   --   --   --  26  --  30   < > 25   < >  --    < > 31   < > 34*   < > 34*   < >  --    ANIONGAP  --   --   --   --  6   < > 3   < > 5   < >  --    < > 7   < > 7   < > 5   < >  --    BUN  --   --   --   --  72*   < > 60*   < > 63*   < >  --    < > 68*   < > 62*   < > 54*   < >  --    CR  --   --   --   --  1.23  --  1.04    < > 1.16   < >  --    < > 1.47*   < > 1.54*   < > 1.60*   < >  --    GFRESTIMATED  --   --   --   --  65   < > 80   < > 70   < >  --    < > 53*   < > 50*   < > 47*   < >  --    *   < >  --    < > 128*   < > 169*   < > 244*   < >  --    < > 148*   < > 92   < > 45*   < >  --    A1C  --   --   --   --   --   --   --   --   --   --   --   --   --   --   --   --   --   --  6.6*   ERIK  --   --   --   --  9.0  --  9.0   < > 8.6   < >  --    < > 8.2*   < > 7.9*   < > 8.0*   < >  --    PHOS  --   --   --   --  4.9*   < > 4.2   < > 3.6   < >  --    < > 2.8  --   --    < >  --    < >  --    MAG  --   --   --   --  1.8   < > 1.8   < > 1.8   < >  --    < > 2.1  --   --    < >  --    < >  --    LACT  --   --   --   --   --   --   --   --  1.3  --  0.5*   < >  --    < >  --    < >  --    < >  --    PCAL  --   --  0.38*  --   --   --   --   --   --   --   --   --   --   --   --   --   --    < >  --    FGTL  --   --   --   --   --   --   --   --   --   --   --   --   --   --   --   --  399  --   --    CKT  --   --   --   --   --   --   --   --   --   --   --   --  51  --  55  --   --    < >  --     < > = values in this interval not displayed.     Hepatic Studies    Recent Labs   Lab Test 10/29/21  0542 10/28/21  0530 10/28/21  0530 10/27/21  0523 10/26/21  0637 10/25/21  1609 10/25/21  1141 10/22/21  1601 10/22/21  0958 09/30/21  1059 09/19/21  1629   BILITOTAL 0.3  --  0.7   < > 0.3   < >  --    < > 0.3   < >  --    DBIL  --   --   --   --  0.1   < >  --    < > 0.1   < >  --    ALKPHOS 106   < > 130   < > 160*   < >  --    < > 156*   < >  --    PROTTOTAL 5.8*   < > 5.9*   < > 5.8*   < >  --    < > 5.1*   < >  --    ALBUMIN 1.0*   < > 1.7*   < > 1.8*   < >  --    < > 1.1*   < >  --    AST 20   < > 21   < > 21   < >  --    < > 27   < >  --    ALT 35   < > 47   < > 71*   < >  --    < > 281*   < >  --    LDH  --   --   --   --   --   --   --   --   --   --  423*   DOREEN  --   --   --   --   --   --  29  --  39  --   --     < > =  values in this interval not displayed.     Pancreatitis testing    Recent Labs   Lab Test 10/22/21  0407 09/07/21  1324 09/07/21  1100   AMYLASE  --   --  32   TRIG 307*   < >  --     < > = values in this interval not displayed.     Hematology Studies   Recent Labs   Lab Test 10/29/21  0542 10/28/21  0530 10/28/21  0530 10/27/21  0523 10/27/21  0523 10/26/21  1020 10/26/21  1020 10/25/21  0326 10/24/21  0410 10/23/21  0344 10/23/21  0344   WBC 38.8*  --  41.0*  --  37.3*  --  35.8*   < > 21.4*   < > 21.3*   ANEU  --   --   --   --   --   --   --   --  19.9*  --  20.0*   ANEUTAUTO 34.3*  --  36.5*   < > 33.0*   < > 30.1*   < >  --   --   --    ALYM  --   --   --   --   --   --   --   --  0.4*   < > 0.2*   ALYMPAUTO 1.7   < > 1.6   < > 1.4   < > 1.8   < >  --   --   --    DONALD  --   --   --   --   --   --   --   --  0.9   < > 1.1   AMONOAUTO 1.6*   < > 1.7*   < > 1.8*   < > 2.3*   < >  --   --   --    AEOS  --   --   --   --   --   --   --   --  0.0   < > 0.0   AEOSAUTO 0.0   < > 0.0   < > 0.0   < > 0.0   < >  --   --   --    ABSBASO 0.1   < > 0.1   < > 0.1   < > 0.1   < >  --   --   --    HGB 9.4*  --  10.3*   < > 10.8*   < > 9.9*   < > 9.8*   < > 9.6*   HCT 30.4*  --  33.2*   < > 35.3*   < > 32.0*   < > 30.5*   < > 30.2*     --  246   < > 273   < > 341   < > 201   < > 132*    < > = values in this interval not displayed.     Clotting Studies    Recent Labs   Lab Test 10/29/21  0542 10/28/21  0530 10/27/21  0523 10/26/21  0637 10/23/21  0344 10/22/21  1407   INR 1.13 1.13 1.16* 1.26*   < > 1.28*   PTT  --   --   --   --   --  43*    < > = values in this interval not displayed.     Iron Testing    Recent Labs   Lab Test 10/29/21  0542 10/23/21  0344 10/22/21  0959 09/19/21  1629 09/19/21  1304 09/10/21  0616 09/09/21  0536   IRON  --   --   --   --   --   --  146   FEB  --   --   --   --   --   --  190*   IRONSAT  --   --   --   --   --   --  77*   ARMEN  --   --   --   --   --   --  1,049*      < >  --     < > 90   < > 87   HAPT  --   --  380*   < >  --   --   --    RETP  --   --   --   --  5.2*  --   --    RETICABSCT  --   --   --   --  0.177*  --   --     < > = values in this interval not displayed.     Autoimmune Testing    Recent Labs   Lab Test 09/07/21  1324   RNPIGG Negative   SMIGG Negative   SSAIGG Negative   SSBIGG Negative     Arterial Blood Gas Testing    Recent Labs   Lab Test 10/29/21  0502 10/28/21  0502 10/27/21  0335 10/26/21  0522 10/25/21  2113 10/25/21  1136   PH 7.53* 7.51* 7.50* 7.56*  --  7.51*   PCO2 37 37 36 30*  --  34*   PO2 97 114* 130* 92  --  87   HCO3 31* 30* 28 27  --  27   O2PER 45 50 60 55   < > 50    < > = values in this interval not displayed.     Thyroid Studies     Recent Labs   Lab Test 10/29/21  1046 10/29/21  0542 09/07/21  1100 09/06/21  0633 09/06/21  0633   TSH  --  7.90* 0.11*  --  0.19*   T4 1.04  --  0.68*   < > 0.73*    < > = values in this interval not displayed.     Urine Studies     Recent Labs   Lab Test 10/25/21  1507 10/22/21  1641 10/17/21  1642 10/17/21  1041 10/15/21  1127   URINEPH 5.5 7.5* 5.0 5.0 5.5   NITRITE Negative Negative Negative Negative Negative   LEUKEST Negative Negative Negative Trace* Negative   WBCU 2 3 25* 44* 1     Medication levels    Recent Labs   Lab Test 10/29/21  0542 10/18/21  0549 10/18/21  0403   VANCOMYCIN  --   --  12.6   TACROL 7.7   < >  --     < > = values in this interval not displayed.     Microbiology:    Fungal testing  Recent Labs   Lab Test 10/20/21  0957   FGTL 399   FGTLI Positive*     Last Culture results with specimen source  Culture   Date Value Ref Range Status   10/26/2021 2+ Normal jean marie  Final   10/26/2021 Candida albicans (A)  Preliminary   10/25/2021 1+ Normal jean marie  Final   10/25/2021 2+ Candida albicans (A)  Final     Comment:     Susceptibilities not routinely done   10/25/2021 Candida albicans (A)  Preliminary   10/25/2021 No growth after 3 days  Preliminary   10/25/2021 No growth after 4 days  Preliminary    10/25/2021 No growth after 4 days  Preliminary   10/25/2021 No growth after 4 days  Preliminary   10/25/2021 No Growth  Final   10/23/2021 No Growth  Final   10/23/2021 No Growth  Final   10/22/2021 1+ Normal jean marie  Final   10/22/2021 3+ Candida albicans (A)  Final   10/22/2021 Positive on the 5th day of incubation (A)  Corrected   10/22/2021 Candida albicans (AA)  Corrected     Comment:     1 of 2 bottles  Identification is preliminary, confirmation in progress  Susceptibilities done on previous cultures   10/22/2021 Positive on the 3rd day of incubation (A)  Final   10/22/2021 Candida albicans (AA)  Final     Comment:     1 of 2 bottles  Susceptibilities done on previous cultures    No results found for: SDES     Last check of C difficile  C Difficile Toxin B by PCR   Date Value Ref Range Status   10/27/2021 Negative Negative Final     Comment:     A negative result does not exclude actual disease due to C. difficile and may be due to improper collection, handling and storage of the specimen or the number of organisms in the specimen is below the detection limit of the assay.       Quantiferon testing   Recent Labs   Lab Test 10/29/21  0542 10/28/21  0530 09/16/21  0645 09/07/21  1324   TBRES  --   --   --  Indeterminate*   LYMPH 4 4   < > 2    < > = values in this interval not displayed.     Virology:    Coronavirus-19 testing    Recent Labs   Lab Test 10/24/21  1644 10/17/21  1329 10/15/21  0614 10/08/21  1414 09/15/21  1216 09/07/21  1324 09/06/21  0010 08/30/21  0752 08/02/21  1230 06/20/20  1454   CD19  --   --   --   --   --  <1*  --   --   --   --    ACD19  --   --   --   --   --  1*  --   --   --   --    [id number] Negative Negative Negative Negative   < >  --    < >  --   --   --    COVIDPCREXT  --   --   --   --   --   --   --  Not Detected Not Detected Not Detected    < > = values in this interval not displayed.     Respiratory virus (non-coronavirus-19) testing    Recent Labs   Lab Test  10/17/21  1331   IFLUA Negative   FLUAH1 Negative   [id number] Negative   FLUAH3 Negative   IFLUB Negative   PIV1 Negative   PIV2 Negative   PIV3 Negative   HRVS Negative   RSVA Negative   RSVB Negative   HMPV Negative   ADVBE Negative   ADVC Negative     CMV viral loads    Recent Labs   Lab Test 10/26/21  1540   CMVQNT Not Detected       Hepatitis B Testing     Recent Labs   Lab Test 10/15/21  0907 09/16/21  2157 09/07/21  1324 09/07/21  1100   AUSAB 0.12  --   --  0.51   HBCAB Nonreactive  --   --  Nonreactive   HEPBANG Nonreactive Nonreactive   < >  --     < > = values in this interval not displayed.     Hepatitis C Antibody   Date Value Ref Range Status   10/15/2021 Nonreactive Nonreactive Final   09/08/2021 Nonreactive Nonreactive Final       CMV Antibody IgG   Date Value Ref Range Status   10/15/2021 No detectable antibody. No detectable antibody.  Final   09/07/2021 No detectable antibody. No detectable antibody.  Final     Varicella Zoster Antibody IgG   Date Value Ref Range Status   09/07/2021 Positive (A) No detectable antibody.  Final     Comment:     Suggests previous exposure or immunization and probable immunity.     EBV Capsid Antibody IgG   Date Value Ref Range Status   10/15/2021 Positive (A) No detectable antibody. Final     Comment:     Suggests recent or past exposure.   09/07/2021 Positive (A) No detectable antibody. Final     Comment:     Suggests recent or past exposure.     Toxoplasma Antibody IgG   Date Value Ref Range Status   09/07/2021 <3.0 0.0 - 7.1 IU/mL Final     Comment:     Negative- Absence of detectable Toxoplasma gondii IgG antibodies. A negative result does not rule out acute infection.     Herpes Simplex Virus Type 1 IgG Antibody   Date Value Ref Range Status   10/15/2021 Positive.  IgG antibody to HSV-1 detected. (A) No HSV-1 IgG antibodies detected Final   09/07/2021 Positive.  IgG antibody to HSV-1 detected. (A) No HSV-1 IgG antibodies detected Final     Herpes Simplex Virus Type  2 IgG Antibody   Date Value Ref Range Status   10/15/2021 Positive.  IgG antibody to HSV-2 detected. (A) No HSV-2 IgG antibodies detected Final   09/07/2021 Positive.  IgG antibody to HSV-2 detected. (A) No HSV-2 IgG antibodies detected Final     Imaging:  Recent Results (from the past 48 hour(s))   XR Chest Port 1 View    Narrative    EXAM: XR CHEST PORT 1 VIEW  10/28/2021 1:00 AM     HISTORY:  s/p lung transplant       COMPARISON:  10/27/2021    TECHNIQUE: Portable AP radiograph of the chest.    FINDINGS:   Endotracheal tube tip projects over the mid-low trachea. Enteric tube  and feeding tube course through the stomach and below the field of  view. Stable bilateral pleural chest tubes.     Postsurgical changes of bilateral lung transplant. The trachea is  midline. Stable heart normal heart size.  Unchanged streaky perihilar  and bibasilar opacities. Mild pulmonary vascular congestion is  present. There is no evidence of associated pulmonary edema. Small  left pleural effusion. No pneumothorax.     There are no osseous abnormalities. No acute abnormality in the  visualized upper abdomen.      Impression    IMPRESSION: In this patient status post bilateral lung transplant:  1. No significant interval change.  2. Stable small left pleural effusion and postoperative atelectasis.  3. Mild pulmonary vascular congestion.       I have personally reviewed the examination and initial interpretation  and I agree with the findings.    FITO PERALES MD         SYSTEM ID:  [id number]   XR Chest Port 1 View    Narrative    Exam: XR CHEST PORT 1 VIEW, 10/28/2021 3:04 PM    Indication: chest tubes removed    Comparison: Same-day radiograph from 0:48 AM    Findings:   Single portable AP view of the chest. Endotracheal tube 4.5 cm from  the shiv. 2 enteric tubes with the tip below the field of view.  Clamshell sternotomy sutures. Skin staples transverse in the lower  chest. Mediastinal and lower right chest surgical clips.  Interval  removal of bilateral chest tubes.    Midline trachea. No pneumothorax. Small left and trace right pleural  effusions. Bibasilar atelectasis. Mild pulmonary congestion.  Unremarkable cardiac silhouette and mediastinum. No acute osseous  abnormalities.      Impression    Impression:   1. Removal of bilateral chest tubes without pneumothorax.  2. Small left and trace right pleural effusions.  3. Mild pulmonary vascular congestion.    I have personally reviewed the examination and initial interpretation  and I agree with the findings.    DAVIN ANGUIANO MD         SYSTEM ID:  [id number]   XR Surgery ISIDORO L/T 5 Min Fluoro w Stills    Narrative    This exam was marked as non-reportable because it will not be read by a   radiologist or a [facility] non-radiologist provider.           10/28, 10/27 CXRs:  Removal of bilateral chest tubes without pneumothorax.  Small left and trace right pleural effusions and postoperative atelectasis.  Mild pulmonary vascular congestion.   10/26 Brain MRI:  1. Evolving acute age multifocal acute infarctions in both cerebral hemispheres and left cerebellum. No new areas of infarction when compared with prior MRI brain on 10/23/2021.  2. Minimally increased punctate regions of susceptibility effect within areas of infarction, corresponding to petechial hemorrhage within previously identified multifocal acute infarctions.  3. Head MRA demonstrates no definite aneurysm or stenosis of the major intracranial arteries.  4. Neck MRA demonstrates patent major cervical arteries.   10/26 CXR:  Improved left pleural effusion and associated lower lobe opacity.  Stable small right pleural effusion.  No new focal airspace opacities.  Stable support devices.  10/26 Head CT:  This exam is significantly limited by motion and streak artifact.  MRI brain 10/23/2021 demonstrated multiple acute/subacute infarcts throughout the bilateral cerebral hemispheres and left cerebellum. Some of these acute/subacute  infarcts demonstrated microhemorrhage on SWI.  Left posterior parietal lobe acute/subacute infarct is identified on current exam. Remaining previously seen infarcts are not identified possibly obscured by artifact. It is possible that a component of the abnormal signal on MRI may of been attributed to posterior reversible encephalopathy syndrome in addition to acute/subacute infarcts.  No CT evidence for a new cortical hypoattenuation to indicate a new acute infarct on current exam.  No definite acute intracranial hemorrhage given artifact.  No midline shift.  10/25 ABX:  Uncomplicated feeding tube placement with tip in the third portion of the duodenum.  10/25 Head / neck CTA:  HEAD CTA:  1.  No intracranial arterial large vessel occlusion.  2.  No significant stenosis/occlusion.  NECK CTA:  1.  Right vertebral artery mild stenosis.  2.  Left vertebral artery origin severe high-grade stenosis.  3.  Otherwise, no significant stenosis/occlusion. No dissection.  10/25 PM CXR:  1. Interval removal of apically directed chest tubes. No appreciable pneumothorax.  2. Cardiomegaly with persistent bilateral interstitial opacities suggestive for pulmonary edema versus atelectasis.  3. Small left pleural effusion with increased left basilar atelectasis versus consolidation.  4. Additional lines and tubes in stable position.   10/25 Chest / abdm CT:  1. Decreased (versus 10/22/21 CT scan) consolidative opacities in the posterolateral left upper lobe and bilateral lung bases, which may represent improving infection and/or atelectasis.  2. Nonocclusive venous thrombosis of the low right internal jugular vein.  3. Decreased trace right hydropneumothorax and small left pleural effusion.  4. Support devices are in similar position, including the right inferior chest tube tip near the 8th-9th intercostal space, not definitively in the pleural space.  5. Decreased anterior chest subcutaneous/intramuscular emphysema.  6. The remainder  of the exam is not significantly changed since 10/22/2021.  10/25 CXR:  1. No significant interval change and stable support devices.  2. Small left pleural effusion. Bibasilar atelectasis. Stable bilateral interstitial and airspace opacities.  3. No pneumothorax.  10/24 CXR:  1.  Stable postsurgical bilateral lung transplant changes with mild  perihilar interstitial opacities suggestive for pulmonary edema versus atelectasis.  2.  Support devices in stable position. No appreciable pneumothorax.   10/23 TTE:  No evidence of endocarditis. Normal biventricular function.  No valvular disease.  10/23 Brain MRI:  Multifocal subacute infarcts within both cerebral hemispheres and left cerebellum all of which appear present on prior head CT 10/22/2021.  Minimal petechial hemorrhage associated with the infarct in the left occipital lobe.  10/23 ABXs:  Gastric tube tip and sidehole projected over the stomach.  Nonobstructive bowel gas pattern.  10/23 TTE:  Valves cannot be assessed in this echo due to bandages on the chest.   10/23 CXR:  1. Postsurgical changes bilateral lung transplant.  2. Stable support devices.  3. Small left pleural effusion. Bibasilar atelectasis. Stable bilateral interstitial and airspace opacities.  4. No appreciable pneumothorax.  10/22 Chest / abdm CT angiogram:  1. No acute pulmonary embolus.  2. Surgical changes of bilateral lung transplantation are stable compared to exam performed earlier today. Similar appearance of opacities throughout both lungs, likely representing atelectasis and/or consolidation.  3. No acute abnormality in the abdomen or pelvis.     10/22 Chest CT:  1. Postoperative changes of bilateral lung transplantation with supportive devices in stable positioning.  2. Multifocal consolidative opacities involving the left upper, left lower, and right lower lobes with scattered mucous plugging.  Concerning for aspiration or infection.  3. Multifocal diffuse groundglass opacities with  mild interlobular septal thickening, compatible with pulmonary edema.  4. Extensive subcutaneous emphysema of the anterior chest wall and right neck.

## 2021-10-29 NOTE — CONSULTS
Consult and Procedure Service - Procedure Note    Attending: david  Resident: n/a  Procedure: Lumbar Puncture  Indication: encephalopathy  Risk Assessment: low  Diagnosis: encephalopathy  The risks and benefits of the procedure were explained to his S.O. who expressed understanding and opted to proceed.  Consent was obtained and placed in the chart.  A time out was performed.    The patient was placed in the L lateral decubitus position and the L5/s1 space was identified with ultrasound using paramedian approach. 1 ml of 1% lidocaine was instilled.  A 5 inch 22 gauge needle was inserted and clera fluid obtained on the 1st attempt.  Opening pressure was 9cm H20. The stylet was replaced and the needle removed.   A total of 15 ml was removed. Flow stopped after 6 ml and on adjustment of the needle, small blood returned, which cleared mostly by tube 4. Anaerobic sample taken.   Patient tolerated the procedure well. Please do not hesitate to contact our service if any complications or concerns arise.   GAMA MARAVILLA MD, [facility]  Internal Medicine Hospitalist & Staff Physician  [facility]  Pager: [phone]  [email or web address]    DOS:  October 29, 2021

## 2021-10-30 ENCOUNTER — APPOINTMENT (OUTPATIENT)
Dept: GENERAL RADIOLOGY | Facility: CLINIC | Age: 56
End: 2021-10-30
Attending: STUDENT IN AN ORGANIZED HEALTH CARE EDUCATION/TRAINING PROGRAM
Payer: COMMERCIAL

## 2021-10-30 LAB
ALBUMIN SERPL-MCNC: 1.6 G/DL (ref 3.4–5)
ALP SERPL-CCNC: 108 U/L (ref 40–150)
ALT SERPL W P-5'-P-CCNC: 29 U/L (ref 0–70)
ANION GAP SERPL CALCULATED.3IONS-SCNC: 7 MMOL/L (ref 3–14)
AST SERPL W P-5'-P-CCNC: 17 U/L (ref 0–45)
BACTERIA BLD CULT: ABNORMAL
BACTERIA BLD CULT: ABNORMAL
BACTERIA BLD CULT: NO GROWTH
BASE EXCESS BLDA CALC-SCNC: 6.7 MMOL/L (ref -9–1.8)
BASOPHILS # BLD AUTO: 0.1 10E3/UL (ref 0–0.2)
BASOPHILS NFR BLD AUTO: 0 %
BILIRUB SERPL-MCNC: 0.2 MG/DL (ref 0.2–1.3)
BUN SERPL-MCNC: 93 MG/DL (ref 7–30)
CALCIUM SERPL-MCNC: 8.9 MG/DL (ref 8.5–10.1)
CHLORIDE BLD-SCNC: 109 MMOL/L (ref 94–109)
CO2 SERPL-SCNC: 26 MMOL/L (ref 20–32)
CREAT SERPL-MCNC: 1.25 MG/DL (ref 0.66–1.25)
EOSINOPHIL # BLD AUTO: 0 10E3/UL (ref 0–0.7)
EOSINOPHIL NFR BLD AUTO: 0 %
ERYTHROCYTE [DISTWIDTH] IN BLOOD BY AUTOMATED COUNT: 16.3 % (ref 10–15)
ERYTHROCYTE [DISTWIDTH] IN BLOOD BY AUTOMATED COUNT: 16.6 % (ref 10–15)
GFR SERPL CREATININE-BSD FRML MDRD: 64 ML/MIN/1.73M2
GLUCOSE BLD-MCNC: 160 MG/DL (ref 70–99)
GLUCOSE BLDC GLUCOMTR-MCNC: 127 MG/DL (ref 70–99)
GLUCOSE BLDC GLUCOMTR-MCNC: 133 MG/DL (ref 70–99)
GLUCOSE BLDC GLUCOMTR-MCNC: 133 MG/DL (ref 70–99)
GLUCOSE BLDC GLUCOMTR-MCNC: 141 MG/DL (ref 70–99)
GLUCOSE BLDC GLUCOMTR-MCNC: 143 MG/DL (ref 70–99)
GLUCOSE BLDC GLUCOMTR-MCNC: 144 MG/DL (ref 70–99)
GLUCOSE BLDC GLUCOMTR-MCNC: 145 MG/DL (ref 70–99)
GLUCOSE BLDC GLUCOMTR-MCNC: 145 MG/DL (ref 70–99)
GLUCOSE BLDC GLUCOMTR-MCNC: 146 MG/DL (ref 70–99)
GLUCOSE BLDC GLUCOMTR-MCNC: 147 MG/DL (ref 70–99)
GLUCOSE BLDC GLUCOMTR-MCNC: 147 MG/DL (ref 70–99)
GLUCOSE BLDC GLUCOMTR-MCNC: 151 MG/DL (ref 70–99)
GLUCOSE BLDC GLUCOMTR-MCNC: 154 MG/DL (ref 70–99)
GLUCOSE BLDC GLUCOMTR-MCNC: 159 MG/DL (ref 70–99)
GLUCOSE BLDC GLUCOMTR-MCNC: 159 MG/DL (ref 70–99)
GLUCOSE BLDC GLUCOMTR-MCNC: 161 MG/DL (ref 70–99)
GLUCOSE BLDC GLUCOMTR-MCNC: 166 MG/DL (ref 70–99)
GLUCOSE BLDC GLUCOMTR-MCNC: 170 MG/DL (ref 70–99)
GLUCOSE BLDC GLUCOMTR-MCNC: 174 MG/DL (ref 70–99)
GLUCOSE BLDC GLUCOMTR-MCNC: 181 MG/DL (ref 70–99)
GRAM STAIN RESULT: ABNORMAL
GRAM STAIN RESULT: ABNORMAL
HCO3 BLD-SCNC: 30 MMOL/L (ref 21–28)
HCT VFR BLD AUTO: 27 % (ref 40–53)
HCT VFR BLD AUTO: 28 % (ref 40–53)
HGB BLD-MCNC: 8.5 G/DL (ref 13.3–17.7)
HGB BLD-MCNC: 8.6 G/DL (ref 13.3–17.7)
IMM GRANULOCYTES # BLD: 1.1 10E3/UL
IMM GRANULOCYTES NFR BLD: 3 %
INR PPP: 1.1 (ref 0.85–1.15)
KOH PREPARATION: NORMAL
KOH PREPARATION: NORMAL
LYMPHOCYTES # BLD AUTO: 1.1 10E3/UL (ref 0.8–5.3)
LYMPHOCYTES NFR BLD AUTO: 3 %
MAGNESIUM SERPL-MCNC: 2.3 MG/DL (ref 1.6–2.3)
MCH RBC QN AUTO: 30.6 PG (ref 26.5–33)
MCH RBC QN AUTO: 31.3 PG (ref 26.5–33)
MCHC RBC AUTO-ENTMCNC: 30.7 G/DL (ref 31.5–36.5)
MCHC RBC AUTO-ENTMCNC: 31.5 G/DL (ref 31.5–36.5)
MCV RBC AUTO: 100 FL (ref 78–100)
MCV RBC AUTO: 99 FL (ref 78–100)
MONOCYTES # BLD AUTO: 1.3 10E3/UL (ref 0–1.3)
MONOCYTES NFR BLD AUTO: 4 %
NEUTROPHILS # BLD AUTO: 32.6 10E3/UL (ref 1.6–8.3)
NEUTROPHILS NFR BLD AUTO: 90 %
NRBC # BLD AUTO: 0 10E3/UL
NRBC BLD AUTO-RTO: 0 /100
O2/TOTAL GAS SETTING VFR VENT: 50 %
PCO2 BLD: 37 MM HG (ref 35–45)
PH BLD: 7.52 [PH] (ref 7.35–7.45)
PHOSPHATE SERPL-MCNC: 4.1 MG/DL (ref 2.5–4.5)
PLATELET # BLD AUTO: 207 10E3/UL (ref 150–450)
PLATELET # BLD AUTO: 274 10E3/UL (ref 150–450)
PO2 BLD: 77 MM HG (ref 80–105)
POTASSIUM BLD-SCNC: 3.7 MMOL/L (ref 3.4–5.3)
POTASSIUM BLD-SCNC: 4 MMOL/L (ref 3.4–5.3)
PROT SERPL-MCNC: 5.7 G/DL (ref 6.8–8.8)
RBC # BLD AUTO: 2.72 10E6/UL (ref 4.4–5.9)
RBC # BLD AUTO: 2.81 10E6/UL (ref 4.4–5.9)
SODIUM SERPL-SCNC: 142 MMOL/L (ref 133–144)
TACROLIMUS BLD-MCNC: 9.6 UG/L (ref 5–15)
TME LAST DOSE: NORMAL H
TME LAST DOSE: NORMAL H
UFH PPP CHRO-ACNC: 0.26 IU/ML
WBC # BLD AUTO: 36.1 10E3/UL (ref 4–11)
WBC # BLD AUTO: 37.5 10E3/UL (ref 4–11)

## 2021-10-30 PROCEDURE — 85610 PROTHROMBIN TIME: CPT | Performed by: STUDENT IN AN ORGANIZED HEALTH CARE EDUCATION/TRAINING PROGRAM

## 2021-10-30 PROCEDURE — 250N000009 HC RX 250: Performed by: STUDENT IN AN ORGANIZED HEALTH CARE EDUCATION/TRAINING PROGRAM

## 2021-10-30 PROCEDURE — 999N000157 HC STATISTIC RCP TIME EA 10 MIN

## 2021-10-30 PROCEDURE — 36415 COLL VENOUS BLD VENIPUNCTURE: CPT | Performed by: STUDENT IN AN ORGANIZED HEALTH CARE EDUCATION/TRAINING PROGRAM

## 2021-10-30 PROCEDURE — 84100 ASSAY OF PHOSPHORUS: CPT | Performed by: STUDENT IN AN ORGANIZED HEALTH CARE EDUCATION/TRAINING PROGRAM

## 2021-10-30 PROCEDURE — 250N000012 HC RX MED GY IP 250 OP 636 PS 637: Performed by: NURSE PRACTITIONER

## 2021-10-30 PROCEDURE — 94640 AIRWAY INHALATION TREATMENT: CPT

## 2021-10-30 PROCEDURE — 0BC48ZZ EXTIRPATION OF MATTER FROM RIGHT UPPER LOBE BRONCHUS, VIA NATURAL OR ARTIFICIAL OPENING ENDOSCOPIC: ICD-10-PCS | Performed by: INTERNAL MEDICINE

## 2021-10-30 PROCEDURE — 71045 X-RAY EXAM CHEST 1 VIEW: CPT | Mod: 26 | Performed by: RADIOLOGY

## 2021-10-30 PROCEDURE — 88312 SPECIAL STAINS GROUP 1: CPT | Mod: 26 | Performed by: PATHOLOGY

## 2021-10-30 PROCEDURE — 83735 ASSAY OF MAGNESIUM: CPT | Performed by: STUDENT IN AN ORGANIZED HEALTH CARE EDUCATION/TRAINING PROGRAM

## 2021-10-30 PROCEDURE — 250N000011 HC RX IP 250 OP 636: Performed by: STUDENT IN AN ORGANIZED HEALTH CARE EDUCATION/TRAINING PROGRAM

## 2021-10-30 PROCEDURE — 85027 COMPLETE CBC AUTOMATED: CPT | Performed by: STUDENT IN AN ORGANIZED HEALTH CARE EDUCATION/TRAINING PROGRAM

## 2021-10-30 PROCEDURE — 99233 SBSQ HOSP IP/OBS HIGH 50: CPT | Mod: 24 | Performed by: INTERNAL MEDICINE

## 2021-10-30 PROCEDURE — 82803 BLOOD GASES ANY COMBINATION: CPT | Performed by: STUDENT IN AN ORGANIZED HEALTH CARE EDUCATION/TRAINING PROGRAM

## 2021-10-30 PROCEDURE — 87210 SMEAR WET MOUNT SALINE/INK: CPT | Performed by: INTERNAL MEDICINE

## 2021-10-30 PROCEDURE — 94668 MNPJ CHEST WALL SBSQ: CPT

## 2021-10-30 PROCEDURE — 36600 WITHDRAWAL OF ARTERIAL BLOOD: CPT

## 2021-10-30 PROCEDURE — 87205 SMEAR GRAM STAIN: CPT | Performed by: INTERNAL MEDICINE

## 2021-10-30 PROCEDURE — 87102 FUNGUS ISOLATION CULTURE: CPT | Performed by: INTERNAL MEDICINE

## 2021-10-30 PROCEDURE — 250N000013 HC RX MED GY IP 250 OP 250 PS 637: Performed by: STUDENT IN AN ORGANIZED HEALTH CARE EDUCATION/TRAINING PROGRAM

## 2021-10-30 PROCEDURE — 80053 COMPREHEN METABOLIC PANEL: CPT | Performed by: STUDENT IN AN ORGANIZED HEALTH CARE EDUCATION/TRAINING PROGRAM

## 2021-10-30 PROCEDURE — 94640 AIRWAY INHALATION TREATMENT: CPT | Mod: 76

## 2021-10-30 PROCEDURE — 99232 SBSQ HOSP IP/OBS MODERATE 35: CPT | Mod: GC | Performed by: PSYCHIATRY & NEUROLOGY

## 2021-10-30 PROCEDURE — 87070 CULTURE OTHR SPECIMN AEROBIC: CPT | Performed by: INTERNAL MEDICINE

## 2021-10-30 PROCEDURE — 99291 CRITICAL CARE FIRST HOUR: CPT | Mod: 24 | Performed by: STUDENT IN AN ORGANIZED HEALTH CARE EDUCATION/TRAINING PROGRAM

## 2021-10-30 PROCEDURE — 250N000013 HC RX MED GY IP 250 OP 250 PS 637: Performed by: THORACIC SURGERY (CARDIOTHORACIC VASCULAR SURGERY)

## 2021-10-30 PROCEDURE — 85025 COMPLETE CBC W/AUTO DIFF WBC: CPT | Performed by: STUDENT IN AN ORGANIZED HEALTH CARE EDUCATION/TRAINING PROGRAM

## 2021-10-30 PROCEDURE — 200N000002 HC R&B ICU UMMC

## 2021-10-30 PROCEDURE — 88312 SPECIAL STAINS GROUP 1: CPT | Mod: TC | Performed by: INTERNAL MEDICINE

## 2021-10-30 PROCEDURE — 88108 CYTOPATH CONCENTRATE TECH: CPT | Mod: 26 | Performed by: PATHOLOGY

## 2021-10-30 PROCEDURE — 84132 ASSAY OF SERUM POTASSIUM: CPT | Performed by: SURGERY

## 2021-10-30 PROCEDURE — 250N000012 HC RX MED GY IP 250 OP 636 PS 637: Performed by: STUDENT IN AN ORGANIZED HEALTH CARE EDUCATION/TRAINING PROGRAM

## 2021-10-30 PROCEDURE — 94003 VENT MGMT INPAT SUBQ DAY: CPT

## 2021-10-30 PROCEDURE — 85520 HEPARIN ASSAY: CPT | Performed by: SURGERY

## 2021-10-30 PROCEDURE — 250N000011 HC RX IP 250 OP 636

## 2021-10-30 PROCEDURE — 31624 DX BRONCHOSCOPE/LAVAGE: CPT

## 2021-10-30 PROCEDURE — 80197 ASSAY OF TACROLIMUS: CPT | Performed by: STUDENT IN AN ORGANIZED HEALTH CARE EDUCATION/TRAINING PROGRAM

## 2021-10-30 PROCEDURE — 0BCB8ZZ EXTIRPATION OF MATTER FROM LEFT LOWER LOBE BRONCHUS, VIA NATURAL OR ARTIFICIAL OPENING ENDOSCOPIC: ICD-10-PCS | Performed by: INTERNAL MEDICINE

## 2021-10-30 PROCEDURE — 87106 FUNGI IDENTIFICATION YEAST: CPT | Performed by: INTERNAL MEDICINE

## 2021-10-30 PROCEDURE — 250N000013 HC RX MED GY IP 250 OP 250 PS 637: Performed by: NURSE PRACTITIONER

## 2021-10-30 PROCEDURE — 71045 X-RAY EXAM CHEST 1 VIEW: CPT

## 2021-10-30 PROCEDURE — 36415 COLL VENOUS BLD VENIPUNCTURE: CPT | Performed by: SURGERY

## 2021-10-30 RX ORDER — GUAR GUM
2 PACKET (EA) ORAL DAILY
Status: DISCONTINUED | OUTPATIENT
Start: 2021-10-30 | End: 2021-11-01

## 2021-10-30 RX ORDER — BUMETANIDE 0.25 MG/ML
2 INJECTION INTRAMUSCULAR; INTRAVENOUS 2 TIMES DAILY
Status: DISCONTINUED | OUTPATIENT
Start: 2021-10-30 | End: 2021-11-01

## 2021-10-30 RX ORDER — POTASSIUM CHLORIDE 20MEQ/15ML
20 LIQUID (ML) ORAL ONCE
Status: COMPLETED | OUTPATIENT
Start: 2021-10-30 | End: 2021-10-30

## 2021-10-30 RX ORDER — HEPARIN SODIUM 10000 [USP'U]/100ML
0-5000 INJECTION, SOLUTION INTRAVENOUS CONTINUOUS
Status: DISCONTINUED | OUTPATIENT
Start: 2021-10-30 | End: 2021-11-02

## 2021-10-30 RX ORDER — LIDOCAINE 4 G/G
2 PATCH TOPICAL
Status: DISCONTINUED | OUTPATIENT
Start: 2021-10-30 | End: 2021-12-13 | Stop reason: HOSPADM

## 2021-10-30 RX ADMIN — POTASSIUM CHLORIDE 20 MEQ: 40 SOLUTION ORAL at 06:24

## 2021-10-30 RX ADMIN — ACETAMINOPHEN 975 MG: 325 TABLET, FILM COATED ORAL at 00:07

## 2021-10-30 RX ADMIN — TACROLIMUS 2 MG: 5 CAPSULE ORAL at 08:16

## 2021-10-30 RX ADMIN — HYDRALAZINE HYDROCHLORIDE 100 MG: 50 TABLET, FILM COATED ORAL at 05:43

## 2021-10-30 RX ADMIN — CARVEDILOL 12.5 MG: 6.25 TABLET, FILM COATED ORAL at 19:53

## 2021-10-30 RX ADMIN — BUMETANIDE 2 MG: 0.25 INJECTION, SOLUTION INTRAMUSCULAR; INTRAVENOUS at 08:13

## 2021-10-30 RX ADMIN — TACROLIMUS 2 MG: 5 CAPSULE ORAL at 18:00

## 2021-10-30 RX ADMIN — MEROPENEM 1 G: 1 INJECTION, POWDER, FOR SOLUTION INTRAVENOUS at 16:38

## 2021-10-30 RX ADMIN — Medication 2 PACKET: at 13:02

## 2021-10-30 RX ADMIN — OXYMETAZOLINE HYDROCHLORIDE 2 SPRAY: 0.05 SPRAY NASAL at 08:13

## 2021-10-30 RX ADMIN — CARVEDILOL 12.5 MG: 6.25 TABLET, FILM COATED ORAL at 08:14

## 2021-10-30 RX ADMIN — PREDNISONE 15 MG: 5 TABLET ORAL at 08:14

## 2021-10-30 RX ADMIN — ROSUVASTATIN CALCIUM 10 MG: 10 TABLET, FILM COATED ORAL at 08:14

## 2021-10-30 RX ADMIN — MYCOPHENOLATE MOFETIL 1500 MG: 200 POWDER, FOR SUSPENSION ORAL at 08:15

## 2021-10-30 RX ADMIN — MYCOPHENOLATE MOFETIL 1500 MG: 200 POWDER, FOR SUSPENSION ORAL at 19:54

## 2021-10-30 RX ADMIN — POTASSIUM CHLORIDE 20 MEQ: 40 SOLUTION ORAL at 08:13

## 2021-10-30 RX ADMIN — PREDNISONE 15 MG: 5 TABLET ORAL at 19:53

## 2021-10-30 RX ADMIN — ACETAMINOPHEN 975 MG: 325 TABLET, FILM COATED ORAL at 10:41

## 2021-10-30 RX ADMIN — AMIODARONE HYDROCHLORIDE 200 MG: 200 TABLET ORAL at 08:14

## 2021-10-30 RX ADMIN — LEVALBUTEROL HYDROCHLORIDE 1.25 MG: 1.25 SOLUTION RESPIRATORY (INHALATION) at 20:50

## 2021-10-30 RX ADMIN — Medication 1 PACKET: at 14:32

## 2021-10-30 RX ADMIN — HYDRALAZINE HYDROCHLORIDE 100 MG: 50 TABLET, FILM COATED ORAL at 00:07

## 2021-10-30 RX ADMIN — HUMAN INSULIN 6 UNITS/HR: 100 INJECTION, SOLUTION SUBCUTANEOUS at 14:27

## 2021-10-30 RX ADMIN — MULTIVIT AND MINERALS-FERROUS GLUCONATE 9 MG IRON/15 ML ORAL LIQUID 15 ML: at 08:13

## 2021-10-30 RX ADMIN — AMLODIPINE BESYLATE 10 MG: 10 TABLET ORAL at 08:15

## 2021-10-30 RX ADMIN — OFLOXAXIN 5 DROP: 3 SOLUTION/ DROPS AURICULAR (OTIC) at 08:13

## 2021-10-30 RX ADMIN — ACETYLCYSTEINE 2 ML: 200 SOLUTION ORAL; RESPIRATORY (INHALATION) at 11:12

## 2021-10-30 RX ADMIN — Medication 1 PACKET: at 08:17

## 2021-10-30 RX ADMIN — HYDRALAZINE HYDROCHLORIDE 100 MG: 50 TABLET, FILM COATED ORAL at 13:01

## 2021-10-30 RX ADMIN — LIDOCAINE 2 PATCH: 560 PATCH PERCUTANEOUS; TOPICAL; TRANSDERMAL at 09:58

## 2021-10-30 RX ADMIN — CALCIUM CARBONATE 600 MG (1,500 MG)-VITAMIN D3 400 UNIT TABLET 1 TABLET: at 18:00

## 2021-10-30 RX ADMIN — LEVALBUTEROL HYDROCHLORIDE 1.25 MG: 1.25 SOLUTION RESPIRATORY (INHALATION) at 11:12

## 2021-10-30 RX ADMIN — MEROPENEM 1 G: 1 INJECTION, POWDER, FOR SOLUTION INTRAVENOUS at 00:06

## 2021-10-30 RX ADMIN — LEVALBUTEROL HYDROCHLORIDE 1.25 MG: 1.25 SOLUTION RESPIRATORY (INHALATION) at 08:28

## 2021-10-30 RX ADMIN — Medication 40 MG: at 08:16

## 2021-10-30 RX ADMIN — ISOSORBIDE DINITRATE 40 MG: 10 TABLET ORAL at 13:01

## 2021-10-30 RX ADMIN — HUMAN INSULIN 8 UNITS/HR: 100 INJECTION, SOLUTION SUBCUTANEOUS at 06:04

## 2021-10-30 RX ADMIN — NYSTATIN 1000000 UNITS: 500000 SUSPENSION ORAL at 13:01

## 2021-10-30 RX ADMIN — Medication 1 PACKET: at 21:14

## 2021-10-30 RX ADMIN — NYSTATIN 1000000 UNITS: 500000 SUSPENSION ORAL at 16:38

## 2021-10-30 RX ADMIN — SULFAMETHOXAZOLE AND TRIMETHOPRIM 80 MG: 200; 40 SUSPENSION ORAL at 08:16

## 2021-10-30 RX ADMIN — LEVOTHYROXINE SODIUM 25 MCG: 0.03 TABLET ORAL at 08:14

## 2021-10-30 RX ADMIN — ACETYLCYSTEINE 2 ML: 200 SOLUTION ORAL; RESPIRATORY (INHALATION) at 20:50

## 2021-10-30 RX ADMIN — ACETAMINOPHEN 975 MG: 325 TABLET, FILM COATED ORAL at 18:00

## 2021-10-30 RX ADMIN — NYSTATIN 1000000 UNITS: 500000 SUSPENSION ORAL at 19:53

## 2021-10-30 RX ADMIN — OFLOXAXIN 5 DROP: 3 SOLUTION/ DROPS AURICULAR (OTIC) at 19:54

## 2021-10-30 RX ADMIN — BUMETANIDE 2 MG: 0.25 INJECTION, SOLUTION INTRAMUSCULAR; INTRAVENOUS at 19:48

## 2021-10-30 RX ADMIN — HEPARIN SODIUM 850 UNITS/HR: 10000 INJECTION, SOLUTION INTRAVENOUS at 10:40

## 2021-10-30 RX ADMIN — ACETYLCYSTEINE 2 ML: 200 SOLUTION ORAL; RESPIRATORY (INHALATION) at 15:50

## 2021-10-30 RX ADMIN — NYSTATIN 1000000 UNITS: 500000 SUSPENSION ORAL at 08:13

## 2021-10-30 RX ADMIN — HUMAN INSULIN 6 UNITS/HR: 100 INJECTION, SOLUTION SUBCUTANEOUS at 00:07

## 2021-10-30 RX ADMIN — ISOSORBIDE DINITRATE 40 MG: 10 TABLET ORAL at 08:14

## 2021-10-30 RX ADMIN — ACYCLOVIR 400 MG: 200 SUSPENSION ORAL at 19:54

## 2021-10-30 RX ADMIN — ACETYLCYSTEINE 2 ML: 200 SOLUTION ORAL; RESPIRATORY (INHALATION) at 08:28

## 2021-10-30 RX ADMIN — FLUCONAZOLE 400 MG: 200 TABLET ORAL at 08:15

## 2021-10-30 RX ADMIN — CALCIUM CARBONATE 600 MG (1,500 MG)-VITAMIN D3 400 UNIT TABLET 1 TABLET: at 08:14

## 2021-10-30 RX ADMIN — Medication 40 MG: at 16:38

## 2021-10-30 RX ADMIN — ACYCLOVIR 400 MG: 200 SUSPENSION ORAL at 08:17

## 2021-10-30 RX ADMIN — MEROPENEM 1 G: 1 INJECTION, POWDER, FOR SOLUTION INTRAVENOUS at 08:13

## 2021-10-30 RX ADMIN — LEVALBUTEROL HYDROCHLORIDE 1.25 MG: 1.25 SOLUTION RESPIRATORY (INHALATION) at 15:50

## 2021-10-30 ASSESSMENT — ACTIVITIES OF DAILY LIVING (ADL)
ADLS_ACUITY_SCORE: 22
ADLS_ACUITY_SCORE: 24
ADLS_ACUITY_SCORE: 22
ADLS_ACUITY_SCORE: 22
ADLS_ACUITY_SCORE: 24
ADLS_ACUITY_SCORE: 24
ADLS_ACUITY_SCORE: 22
ADLS_ACUITY_SCORE: 24
ADLS_ACUITY_SCORE: 22
ADLS_ACUITY_SCORE: 22

## 2021-10-30 ASSESSMENT — MIFFLIN-ST. JEOR: SCORE: 1459

## 2021-10-30 NOTE — PROGRESS NOTES
Pulmonary Medicine  Cystic Fibrosis - Lung Transplant Team  Progress Note  10/30/2021       Patient: Edson Thornton  MRN: [id number]  : 1965 (age 56 year old)  Transplant: 10/16/2021 (Lung), POD#14  Admission date: 2021    Assessment & Plan:     Edson Thornton is a 56 year old male with a PMH significant for NSIP/ILD, bronchiectasis, moderate PH, RA, SALTY, chronic HSV infection, hypogammaglobulinemia, steroid-induced diabetes, hypothyroidism, PFO, HTN, HLD, duodenal anomaly, anxiety, and depression.  Admitted on 21 from OSH for acute on chronic respiratory failure 2/2 ILD exacerbation, now s/p BSLT on 10/16/21.  Surgery relatively uncomplicated but pt. with low cardiac index and PGD immediately post-op.  Caroline weaned off 10/22, remains intubated.  Post-op course complicated by encephalopathy and diffuse weakness, acute to subacute CVA, afib with RVR, BRENNAN, GI bleed due to NJ/OG trauma, Candidemia, and recurring fevers.  Neuro status remains tenuous, though ongoing improvement noted since 10/29.       Today's recommendations:   - Trach and PEG/J tube placed by thoracic on 10/29  - Tacrolimus level again today to trend (therapeutic, no change)  - Bronch today for increased O2 requirements, thick secretion in the RUL, LLL, mild mucosal erythema   - Prednisone taper ordered 10/31  - Reduce diuresis (goal net even)  - Primary to reduce antihypertensives as improving BPs   - CMV and EBV   - Coccidioides Ag   - primary team to discuss LP results with neuro - no indication to increase Abx  - Meropenem for empiric ABX coverage given recurrent fevers  - Repeat IgG   - DSA repeat ordered   - Donor risk labs 11/15     S/p BSLT for ILD:  Acute hypoxic respiratory failure:   Pulmonary edema:  Subcutaneous emphysema: Unfortunately had not received vaccination for flu, PNA, or COVID-19 PTA.  Explant pathology with NSIP, no malignancy.  PGD 2-3.  Weaned off paralytic 10/19 (for vent  dyssynchrony), pressors 10/21 (now hypertensive), and Caroline 10/22.  Initial difficulty weaning sedation given agitation then with neurological findings as below.  CXR initially post-op with pulmonary edema, aggressively diuresed.  Recurring fevers post-op, see ID section below.  Bronch 10/20 with tan secretions to LLL, repeat 10/23 with frothy clear secretions in right TB tree.  Chest CT (10/25) with interval improvement in consolidative opacities to BLL and KARI with trace right hydroPTX (personally reviewed).  Bronch (10/26) with minimal sticky secretions, anastomosis intact.  Remains intubated and with persistent encephalopathy as below.  - Nebs: levalbuterol and Mucomyst QID  - Aggressive pulmonary toilet with chest physiotherapy QID  - Ventilator management and PST per ICU team - FIO2 and PEEP increased to 60% and 8, respectively on 10/30 - plan for bronch for secretion clearance  - Trach and PEG/J tube placed by thoracic on 10/29 without incident  - Agree with ongoing diuresis as tolerated  - CXR today with mild improvement, ongoing b/l opacities     Immunosuppression: s/p induction therapy with basiliximab 10/16 (and high dose IV steroid) and 10/20  - Tacrolimus 2 mg BID suspension (increased 10/28, fluconazole 10/26, recently held 10/23-10/25 due to supratherapeutic levels).  Goal level 8-12.  Level today 9.6 (11 hr trough), no dose adjustment today, continue daily levels for now.  - MMF 1500 mg BID (on PTA, AZA to be avoided given TPMT)  - Prednisone 15 mg BID with next taper on 10/31 (ordered) per lung transplant protocol:  Date AM dose (mg) PM dose (mg)   10/24/21 15 15   10/31/21 15 12.5   11/7/21 12.5 12.5   11/21/21 12.5 10   12/5/21 10 10   1/2/22 10 7.5   1/30/22 7.5 7.5   2/27/22 7.5 5   3/27/22 5 5   4/24/22 5 2.5      Prophylaxis:   - Bactrim for PJP ppx (10/25, held prior d/t BRENNAN)  - Nystatin for oral candidiasis ppx, 6 month course  - See below for serologies and viral ppx:    Donor Recipient  Intervention   CMV status Negative Negative None, CMV monthly (ordered 11/16)   EBV status Positive Positive None, EBV monthly (ordered 11/16)   HSV status N/A Positive Acyclovir POD #1-30   (recent infection history pre-txp)      ID: Concern for possible Strongyloides exposure pre-transplant s/p ivermectin x1 dose (9/17).  Donor and recipient cultures NGTD.  S/p IV ceftazidime/vancomycin for 48h per protocol and additional empiric ceftazidime 10/19-10/23 given recurrent fevers.  Bronch cultures 10/17 with Staph epi.  Cryptococcal Ag negative 10/23.    - Monthly Coccidioides Ag x12 months post-transplant per ID (urine and serum negative 10/17), due 11/17 (ordered)     Recurring fevers:  Fevers post-op, Tmax 101.7 POD #1.  Febrile with worsening leukocytosis again 10/25, generally persisting though now improving since 10/28.   - Trach sputum culture (10/25) with GPC, following  - Pleural cultures (10/25) with 3+ WBC, following  - Bronch cultures (10/26) with yeast, following  - LP 10/29 by medicine procedural team, xanthochromic with pleocytosis but thought to be appropriate given RBC and WBCs  - Meropenem (10/28) for empiric ABX coverage given recurrent fevers though unclear source      Disseminated Candida:  Noted on 10/20 and 10/22.  BDG fungitell positive (399) on 10/20.  Respiratory cultures with persistent Candida albicans.  TC 10/23 without evidence of endocarditis.   Ophthalmology consult 10/24 with benign dilated fundoscopic exam.  Candida empyema also noted 10/25, chest tubes inadvertently removed by CVTS on 10/28 (plan was to continue at least one for a few days longer given positive cultures).  - Repeat blood cultures (10/23, 10/25) NGTD  - Fluconazole (10/26, prior micafungin 10/22-10/27), likely through 11/8 but final duration TBD pending clinical course     HSV: Chronic intermittent active infection pre-transplant with recent HSV infection: crusted lesions throughout left side of jaw, s/p 10 day  treatment course of ACV through 10/9.  HSV PCR blood negative 10/17.  - ACV ppx as above (started POD #1 instead of POD #8 given HSV history and location)     Hypogammaglobulinemia: IgG previously low at 364 (9/7).  Noted at 265 at time of transplant, s/p IVIG with premedication 10/21.    - Repeat IgG 11/17 (ordered)     Positive cross match: Note that he received two doses of rituximab in June, which is likely contributing to cross match result.  DSA negative 10/17, repeat DSA 10/23 invalid with recent IVIG (as above).  - Repeat DSA 11/1 (ordered), monitoring q2 weeks     PHS risk criteria donor:  Additional labs required post-transplant (between 4-8 weeks post-op): Hepatitis B, Hepatitis C, and HIV by VISHAL (ordered 11/15).     SALTY: Noted pre-transplant.  Home CPAP 6-12 cm H2O.  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  MRI (10/23) with multifocal subacute infarcts within both cerebral hemispheres and left cerebellum.  EEG without seizures 10/22, ammonia normal.  DDx include surgery v embolic v infectious (see above regarding ID work up).  Heparin drip started 10/23.   Repeat stroke code 10/25 with marked decrease in responsiveness with sedation wean.  Pupils not equal (3 mm R, 2 mm L with extropia) and gag reflex initially absent.  CTA head without obvious new pathology, MRI brain (with and without contrast) primarily revealing for infarct, low likelihood of PRES.  VEEG per neuro with severe diffuse encephalopathy.  Etiology of CVA likely 2/2 afib, PFO, or perioperative.  - Heparin management per neurology and primary  - ID workup and management as above     Afib with RVR: Noted 10/18, started on  amiodarone drip and transitioned to PO 10/21.  Currently in SR.  Heparin drip started 10/23 as above.  Transitioned to 200 mg daily amiodarone dosing on 10/29 (anticipate 4 week course).     BRENNAN, Improving  Rising BUN: Baseline creatinine 0.7.  BRENNAN noted POD #1, UO also downtrending.  Improving with aggressive diuresis but now worsened with worsened BUN. BUN rise may be from recent PEG/J, over-diuresis, less likely TFs. Reducing this today, 10/30 with goal net even.      Elevated LFTs, Resolved: Shock liver post-op.  Initial ALT//230 evening of surgery, then with marked increase to 1550/1246 POD #1.  Now resolved (10/28).     GI bleed, Resolved: Attributed to mechanical trauma.  Hemoglobin dropped to 6.6 10/22, s/p 2 units pRBC.  OG tube with bloody output, EGD 10/23 noted NJ/OG tube trauma with scant oozing.  IV PPI BID.  Hemoglobin now stable.     We appreciate the excellent care provided by the CVICU and CVTS teams.  Recommendations communicated via in person rounding and this note.  Will continue to follow along closely, please do not hesitate to call with any questions or concerns.     Jannette Pereira MD  Pulmonary, Allergy, Critical Care, and Sleep Medicine   [facility]   Pager: [phone]    Subjective & Interval History:     Improving arousal today, licking lips, turning head to voice but not tracking. Unequal pupils noted by RN overnight but resolved when re-evaluated.     Review of Systems:     ROS as above, otherwise severely limited by intubation and encephalopathy.  Unsure if pt. did PS yesterday, not documented or noted.  Continued bloody drainage from right ear despite ear packing by ENT and prn Afrin.  Per primary team and nursing pt. did move his feet to touch this morning on exam 10/29 and then was purposeful on exam today 10/30.    Physical Exam:     Vital signs:  Temp: (!) 96.6  F (35.9  C) Temp src: Bladder BP: (!) 77/46 Pulse: 63   Resp: 21 SpO2: 95 % O2 Device: Mechanical  "Ventilator Oxygen Delivery: 15 LPM Height: 162.6 cm (5' 4\") Weight: 71.8 kg (158 lb 4.6 oz)  I/O:     Intake/Output Summary (Last 24 hours) at 10/30/2021 1417  Last data filed at 10/30/2021 1400  Gross per 24 hour   Intake 2228.58 ml   Output 2210 ml   Net 18.58 ml       Constitutional: laying in bed, eyes open, does turn towards voice, not tracking, in no distress  HEENT: Trach in place.  PULM: Non-labored breathing on full vent support.At times becomes more tachypneic with large Vts. Coarse b/l but improved with suctioning. Good air movement b/l.   CV:  Trace BUE edema.   ABD: Rectal tube in place with brown output.  PEG/J tube not visualized.  MSK: + muscle wasting.   NEURO: Not responsive to verbal stimuli.   SKIN: Warm, dry.  No rash on limited exam.   PSYCH: Mood calm.     Lines, Drains, and Devices:  Peripheral IV 10/22/21 Left;Posterior Lower forearm (Active)   Site Assessment [facility] 10/29/21 0400   Line Status Saline locked;Checked every 1-2 hour 10/29/21 0400   Dressing Intervention New dressing  10/27/21 2000   Phlebitis Scale 0-->no symptoms 10/29/21 0400   Infiltration Scale 0 10/29/21 0400   Number of days: 7       Peripheral IV 10/25/21 Right;Anterior;Lateral Lower forearm (Active)   Site Assessment [facility] 10/29/21 0400   Line Status Saline locked;Checked every 1-2 hour 10/29/21 0400   Dressing Intervention New dressing  10/27/21 2000   Phlebitis Scale 0-->no symptoms 10/29/21 0400   Infiltration Scale 0 10/29/21 0400   Infiltration Site Treatment Method  None 10/27/21 1600   If infiltrated, was a vesicant infusing? No 10/27/21 1600   Number of days: 4       Peripheral IV 10/25/21 Anterior;Distal;Right Upper arm (Active)   Site Assessment [facility] 10/29/21 0400   Line Status Saline locked;Checked every 1-2 hour 10/29/21 0400   Dressing Intervention Dressing reinforced 10/27/21 1600   Phlebitis Scale 0-->no symptoms 10/29/21 0400   Infiltration Scale 0 10/29/21 0400   Infiltration Site Treatment Method  None " 10/27/21 0400   Number of days: 4     Data:     LABS    CMP:   Recent Labs   Lab 10/30/21  1105 10/30/21  0956 10/30/21  0945 10/30/21  0804 10/30/21  0644 10/30/21  0514 10/30/21  0503 10/29/21  0627 10/29/21  0542 10/28/21  0607 10/28/21  0530 10/27/21  2220 10/27/21  2130 10/27/21  1806 10/27/21  1635 10/27/21  0621 10/27/21  0523   NA  --   --   --   --   --   --  142  --  139  --  143  --  147*   < > 148*   < > 152*   POTASSIUM  --   --  4.0  --   --   --  3.7  --  4.6  --  5.0  --   --    < > 5.2   < > 4.4   CHLORIDE  --   --   --   --   --   --  109  --  107  --  110*  --   --   --   --   --  120*   CO2  --   --   --   --   --   --  26  --  26  --  30  --   --   --   --   --  27   ANIONGAP  --   --   --   --   --   --  7  --  6  --  3  --   --   --   --   --  5   * 166*  --  133* 127*   < > 160*   < > 128*   < > 169*   < >  --    < >  --    < > 208*   BUN  --   --   --   --   --   --  93*  --  72*  --  60*  --   --   --   --   --  60*   CR  --   --   --   --   --   --  1.25  --  1.23  --  1.04  --   --   --   --   --  0.90   GFRESTIMATED  --   --   --   --   --   --  64  --  65  --  80  --   --   --   --   --  >90   ERIK  --   --   --   --   --   --  8.9  --  9.0  --  9.0  --   --   --   --   --  9.1   MAG  --   --   --   --   --   --  2.3  --  1.8  --  1.8  --   --   --   --   --  2.0   PHOS  --   --   --   --   --   --  4.1  --  4.9*  --  4.2  --   --   --  3.2   < > 2.6   PROTTOTAL  --   --   --   --   --   --  5.7*  --  5.8*  --  5.9*  --   --   --   --   --  6.2*   ALBUMIN  --   --   --   --   --   --  1.6*  --  1.0*  --  1.7*  --   --   --   --   --  1.8*   BILITOTAL  --   --   --   --   --   --  0.2  --  0.3  --  0.7  --   --   --   --   --  0.3   ALKPHOS  --   --   --   --   --   --  108  --  106  --  130  --   --   --   --   --  160*   AST  --   --   --   --   --   --  17  --  20  --  21  --   --   --   --   --  27   ALT  --   --   --   --   --   --  29  --  35  --  47  --   --   --   --   --   71*    < > = values in this interval not displayed.     CBC:   Recent Labs   Lab 10/30/21  0944 10/30/21  0503 10/29/21  0542 10/28/21  0530   WBC 37.5* 36.1* 38.8* 41.0*   RBC 2.72* 2.81* 3.04* 3.38*   HGB 8.5* 8.6* 9.4* 10.3*   HCT 27.0* 28.0* 30.4* 33.2*   MCV 99 100 100 98   MCH 31.3 30.6 30.9 30.5   MCHC 31.5 30.7* 30.9* 31.0*   RDW 16.6* 16.3* 16.0* 15.9*    207 220 246       INR:   Recent Labs   Lab 10/30/21  0503 10/29/21  0542 10/28/21  0530 10/27/21  0523   INR 1.10 1.13 1.13 1.16*       Glucose:   Recent Labs   Lab 10/30/21  1105 10/30/21  0956 10/30/21  0804 10/30/21  0644 10/30/21  0553 10/30/21  0514   * 166* 133* 127* 133* 143*       Blood Gas:   Recent Labs   Lab 10/30/21  0531 10/29/21  0502 10/28/21  0502 10/26/21  0522 10/25/21  2113 10/24/21  0010 10/23/21  2108 10/23/21  2016 10/23/21  1543   PHV  --   --   --   --  7.47*  --  7.42  --  7.44*   PCO2V  --   --   --   --  36*  --  45  --  46   PO2V  --   --   --   --  78*  --  36  --  36   HCO3V  --   --   --   --  27  --  29*  --  31*   LORI  --   --   --   --  2.9*  --  4.1*  --  6.5*   O2PER 50 45 50   < > 55   < > 50   < > 50  50    < > = values in this interval not displayed.       Culture Data No results for input(s): CULT in the last 168 hours.    Virology Data:   Lab Results   Component Value Date    FLUAH1 Negative 10/17/2021    FLUAH3 Negative 10/17/2021    HV5943 Negative 10/17/2021    IFLUB Negative 10/17/2021    RSVA Negative 10/17/2021    RSVB Negative 10/17/2021    PIV1 Negative 10/17/2021    PIV2 Negative 10/17/2021    PIV3 Negative 10/17/2021    HMPV Negative 10/17/2021    HRVS Negative 10/17/2021    ADVBE Negative 10/17/2021    ADVC Negative 10/17/2021       Historical CMV results (last 3 of prior testing):  Lab Results   Component Value Date    CMVQNT Not Detected 10/26/2021     No results found for: CMVLOG    Urine Studies    Recent Labs   Lab Test 10/25/21  1507 10/22/21  1641   URINEPH 5.5 7.5*   NITRITE  Negative Negative   LEUKEST Negative Negative   WBCU 2 3       Most Recent [place] Pulmonary Function Testing (FVC/FEV1 only)  No results found for: 20002  No results found for: 20003  No results found for: 20015  No results found for: 20016    IMAGING    Recent Results (from the past 48 hour(s))   XR Chest Port 1 View    Narrative    EXAMINATION: XR CHEST PORT 1 VIEW, 10/27/2021 5:00 PM    INDICATION: Chest tube removal    COMPARISON: Chest x-ray 10/27/2021 at 12:55 AM.    FINDINGS: Single portable 40 degree upright AP radiograph of the  chest. ET tube projects at the mid thoracic trachea. 2 enteric tubes  course below the left hemidiaphragm, tips are now within view.  Interval removal of mediastinal drain. Stable positioning of bilateral  basilar chest tubes.    Postsurgical changes of bilateral lung transplant. Trachea is midline.  The cardiomediastinal silhouette is stably enlarged. Asymmetric  elevation of the right hemidiaphragm. No appreciable pneumothorax.  Small left pleural effusion. Bibasilar atelectasis. Unchanged  interstitial opacities.    Partially visualized upper abdomen is unremarkable. No acute osseous  abnormality. Soft tissue is within normal limits.         Impression    IMPRESSION:  1. Interval removal of pericardial drain. No appreciable pneumothorax.  Remainder of support devices are stable.  2. Postsurgical changes of bilateral lung transplant. Small left  pleural effusion. Bibasilar atelectasis. Decreased interstitial  opacities.    I have personally reviewed the examination and initial interpretation  and I agree with the findings.    ARIES DELGADO MD         SYSTEM ID:  [id number]   XR Chest Port 1 View    Narrative    EXAM: XR CHEST PORT 1 VIEW  10/28/2021 1:00 AM     HISTORY:  s/p lung transplant       COMPARISON:  10/27/2021    TECHNIQUE: Portable AP radiograph of the chest.    FINDINGS:   Endotracheal tube tip projects over the mid-low trachea. Enteric tube  and feeding tube course  through the stomach and below the field of  view. Stable bilateral pleural chest tubes.     Postsurgical changes of bilateral lung transplant. The trachea is  midline. Stable heart normal heart size.  Unchanged streaky perihilar  and bibasilar opacities. Mild pulmonary vascular congestion is  present. There is no evidence of associated pulmonary edema. Small  left pleural effusion. No pneumothorax.     There are no osseous abnormalities. No acute abnormality in the  visualized upper abdomen.      Impression    IMPRESSION: In this patient status post bilateral lung transplant:  1. No significant interval change.  2. Stable small left pleural effusion and postoperative atelectasis.  3. Mild pulmonary vascular congestion.       I have personally reviewed the examination and initial interpretation  and I agree with the findings.    FITO PERALES MD         SYSTEM ID:  [id number]   XR Chest Port 1 View    Narrative    Exam: XR CHEST PORT 1 VIEW, 10/28/2021 3:04 PM    Indication: chest tubes removed    Comparison: Same-day radiograph from 0:48 AM    Findings:   Single portable AP view of the chest. Endotracheal tube 4.5 cm from  the shiv. 2 enteric tubes with the tip below the field of view.  Clamshell sternotomy sutures. Skin staples transverse in the lower  chest. Mediastinal and lower right chest surgical clips. Interval  removal of bilateral chest tubes.    Midline trachea. No pneumothorax. Small left and trace right pleural  effusions. Bibasilar atelectasis. Mild pulmonary congestion.  Unremarkable cardiac silhouette and mediastinum. No acute osseous  abnormalities.      Impression    Impression:   1. Removal of bilateral chest tubes without pneumothorax.  2. Small left and trace right pleural effusions.  3. Mild pulmonary vascular congestion.    I have personally reviewed the examination and initial interpretation  and I agree with the findings.    DAVIN ANGUIANO MD         SYSTEM ID:  [id number]

## 2021-10-30 NOTE — PROGRESS NOTES
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 Corral.  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    OVERNIGHT EVENTS:  Pupil asymmetry noted with pupillometer, evaluated by moonlighter with no evidence of neuro changes.  See note from Dr. Centeno at 138am.    TODAY'S PROGRESS:   - resume heparin gtt  - decrease diuresis   - increase lantus     PLAN:  Neuro/ pain/ sedation:  Acute postoperative pain  Anxiety and depression  Acute to subacute CVA, b/l cerebral hemispheres and L cerebellum, suspect embolic  Encephalopathy and diffuse weakness  R ear lateral canal floor excoriation with bleeding     - Pain and sedation: none  - scheduled APAP, add lido patch   - appreciate ENT, continue otowicks, floxin drops x5d, afrin prn   - appreciate neuro, will d/w RE LP results    Pulmonary care:   SALTY on home CPAP  Acute hypoxic respiratory failure s/p b/l lung transplant 10/16/21  S/p tracheostomy 10/29  - Ventilator Settings:CMV- 14/400/8/wean  - Levalbuterol nebs and Mucomyst, chest PT  - Daily CXR  - PST BID  - diuresis as below   - possible bronch today, per Pulm     Cardiovascular:    HTN  Afib   PFO  Cardiogenic shock followed by severe HTN - now normotensive    - Monitor hemodynamic status.   - MAP goal <100, SBP <140  - Amlodipine 10 mg daily, hydralazine 100 mg oral q6, isordil 40 tid, coreg 12.5 BID   - prn labetalol  - rosuvastatin 10mg  - Amiodarone 200 mg daily   - low-intensity heparin gtt resume today     GI care/ Nutrition:   Elevated LFTs - resolved   GI bleed 2/2 NG trauma per GI, resolved   Nutritional support   - Diet: goal TF via PEG-J  - PPI BID  - add fiber   - Continue bowel regimen: miralax,  senna, prn MoM, suppository     Renal/ Fluid Balance/ Electrolytes:   Acute kidney injury    Hypernatremia, improved with FWF  BL creat appears to be ~ 0.7  - bumex 2 BID for goal net even  - free water flushes 75 q4  - Strict I/O, daily weights  - Avoid/limit nephrotoxins as able  - Replete lytes PRN per protocol     Endocrine:    Stress induced hyperglycemia with steroid-induced component, on DM2  Hypothyroidism  RA  Preop A1c 6.6  - insulin gtt, lantus 20u today  - Goal BG <180 for optimal healing  - continue home synthroid     ID/ Antibiotics:  Immunosuppression s/p transplant  Candidal infection of donor lungs (likely source), pleural fluid, and fungemia   - NGTD CSF. Last positive blood cx 10/22.  No vegetations on valves per TC 10/23  - continue fluconazole pending clinical course  - empiric meropenem x5d  - Nystatin, Acyclovir, bactrim   - Tacrolimus (via g tube), prednisone   - Follow cultures   - appreciate transplant ID, ophthalmology   - Continue to monitor fever curve, WBC and inflammatory markers as appropriate      Heme:     Acute blood loss anemia  Hypogammaglobulinemia s/p IVIG  Thrombocytopenia, HIT negative - resolved   No s/sx active bleeding  - CBC   - ID as above    MSK/ Skin:  Clamshell surgical incision  - Sternal precautions  - Postoperative incision management per protocol  - PT/OT/CR     Prophylaxis:    - Mechanical prophylaxis for DVT: SCDs  - Chemical DVT prophylaxis: heparin gtt    - PPI for 30 days postoperatively     Lines/ tubes/ drains:  - trach  - peg-j  - Awtson 10/25       Disposition:  - CV ICU.      Patient seen, findings and plan discussed with CV ICU staff.     Altagracia Allan MD  Surgery Resident PGY-3  Pg [phone]    ====================================    SUBJECTIVE:   Concern for unequal pupils overnight but neuro exam unchanged.      OBJECTIVE:   1. VITAL SIGNS:   Temp:  [97.5  F (36.4  C)-100  F (37.8  C)] 97.5  F (36.4  C)  Pulse:  [] 85  Resp:  [16-30] 25  BP:  ()/() 121/69  FiO2 (%):  [45 %-60 %] 60 %  SpO2:  [90 %-96 %] 96 %  Ventilation Mode: CMV/AC  (Continuous Mandatory Ventilation/ Assist Control)  FiO2 (%): 60 %  Rate Set (breaths/minute): 14 breaths/min  Tidal Volume Set (mL): 400 mL  PEEP (cm H2O): 5 cmH2O  Oxygen Concentration (%): 50 %  Resp: 25    2. INTAKE/ OUTPUT:   I/O last 3 completed shifts:  In: 2295.65 [I.V.:835.65; NG/GT:695]  Out: 2410 [Urine:1790; Emesis/NG output:215; Stool:400; Blood:5]    3. PHYSICAL EXAMINATION:   General: critically ill  Neuro: eyes open, closing mouth and licking lips, turns head, moves both feet spontaneously but not to command   Resp: vented via trach, overbreathing vent  CV: RRR on tele   Abdomen: nondistended  Incisions: c/d/i  Extremities: warm and well perfused, no edema    4. INVESTIGATIONS:   Arterial Blood Gases   Recent Labs   Lab 10/30/21  0531 10/29/21  0502 10/28/21  0502 10/27/21  0335   PH 7.52* 7.53* 7.51* 7.50*   PCO2 37 37 37 36   PO2 77* 97 114* 130*   HCO3 30* 31* 30* 28     Complete Blood Count   Recent Labs   Lab 10/30/21  0503 10/29/21  0542 10/28/21  0530 10/27/21  0523   WBC 36.1* 38.8* 41.0* 37.3*   HGB 8.6* 9.4* 10.3* 10.8*    220 246 273     Basic Metabolic Panel  Recent Labs   Lab 10/30/21  0804 10/30/21  0644 10/30/21  0553 10/30/21  0514 10/30/21  0503 10/30/21  0415 10/29/21  0627 10/29/21  0542 10/28/21  0607 10/28/21  0530 10/27/21  2220 10/27/21  2130 10/27/21  1806 10/27/21  1635 10/27/21  0621 10/27/21  0523   NA  --   --   --   --  142  --   --  139  --  143  --  147*   < > 148*   < > 152*   POTASSIUM  --   --   --   --  3.7  --   --  4.6  --  5.0  --   --   --  5.2   < > 4.4   CHLORIDE  --   --   --   --  109  --   --  107  --  110*  --   --   --   --   --  120*   CO2  --   --   --   --  26  --   --  26  --  30  --   --   --   --   --  27   BUN  --   --   --   --  93*  --   --  72*  --  60*  --   --   --   --   --  60*   CR  --   --   --   --  1.25  --   --  1.23  --   1.04  --   --   --   --   --  0.90   * 127* 133* 143* 160*   < >   < > 128*   < > 169*   < >  --    < >  --    < > 208*    < > = values in this interval not displayed.     Liver Function Tests  Recent Labs   Lab 10/30/21  0503 10/29/21  0542 10/28/21  0530 10/27/21  0523   AST 17 20 21 27   ALT 29 35 47 71*   ALKPHOS 108 106 130 160*   BILITOTAL 0.2 0.3 0.7 0.3   ALBUMIN 1.6* 1.0* 1.7* 1.8*   INR 1.10 1.13 1.13 1.16*     Pancreatic Enzymes  No lab results found in last 7 days.  Coagulation Profile  Recent Labs   Lab 10/30/21  0503 10/29/21  0542 10/28/21  0530 10/27/21  0523   INR 1.10 1.13 1.13 1.16*        =========================================    Critical Care Services Progress Note:     Edson Thornton remains critically ill with mental status changes     I personally examined and evaluated the patient today.   The patient s prognosis today is unchanged  I have evaluated all laboratory values and imaging studies from the past 24 hours.  Key findings and decisions made today overall improvement in clinical status, moves his feet spontaneously as well as withdraws from pain, turns his head from noxious stimuli and seems to have some respiratory effort change with coaching. Trach and G/J site look good, resuming heparin. LP results are concerning with elevated CSF WBC however with a relative bloody tap the CSF WBC is of questionable utility with a serum WBC of 40K, I am hesitant to increasing the meropenem dosage to 2g for CSF coverage just on this, will discuss with the neurology team who is following. Otherwise remain cautiously optimistic in the face of improving neuro exams. Also new worsening azotemia however given the recent GI surgery unclear importance will follow  I personally managed the ventilator, sedation, pain control and analgesia and antibiotic therapy.   Consults ongoing and ordered are none  Procedures that will happen today are: none  All treatments were placed at my direction.  I  formulated today s plan with the house staff team or resident(s) and agree with the findings and plan in the associated note.       The above plans and care have been discussed with no one and all questions and concerns were addressed.     I spent a total of 38 minutes (excluding procedure time) personally providing and directing critical care services at the bedside and on the critical care unit for Edson Thornton.        Ronnie Ron MD

## 2021-10-30 NOTE — PROGRESS NOTES
[facility]    Stroke Progress Note    Interval Events- LP performed 10/29: no concern for CNS infection per ID, no recommendations for further antimicrobial treatment.  - Neurological examination improved today.  - Will continue to follow for CSF cultures.    HPI Summary  Edson Thornton is a 56 year old male with with Afib, HTN, RA, SALTY, and ILD s/p lung transplant 10/16/21.  Per primary team he has had significant difficulty with ventilation required paralytics and sedation, however, he has typically moved all his extremities with full strength.  It was noted on 10/21 that he was no longer moving his lower extremities.  Stroke code activated on morning of 10/22 for persistent neurologic change.  CT and Brain MRI showed subacute bilateral cortical infarcts and left cerebellar infarct.  Neurologic exam improved on 10/30 s/p trach/peg procedure.  Pt now grimaces to painful stimuli in all 4 extremities and turns his head to loud voice.     Stroke Evaluation Summarized    MRI/Head CT MRI Brain (10/26/2021):  Impression:  1. Evolving acute age multifocal acute infarctions in both cerebral  hemispheres and left cerebellum. No new areas of infarction when  compared with prior MRI brain on 10/23/2021.  2. Minimally increased punctate regions of susceptibility effect  within areas of infarction, corresponding to petechial hemorrhage  within previously identified multifocal acute infarctions.  3. Head MRA demonstrates no definite aneurysm or stenosis of the major  intracranial arteries.  4. Neck MRA demonstrates patent major cervical arteries.    MRI Brain (10/23/2021):  Impression:  Multifocal subacute infarcts within both cerebral hemispheres and left cerebellum all of which appear present on prior head CT 10/22/2021.  Minimal petechial hemorrhage associated with the infarct in the left occipital lobe.     Head CT (10/22/2021) : multiple scattered subacute ischemic strokes,  predating exam change from 10/22.  May contribute to encephalopathy, but would not explain lack of extremity movement.     Head CT (10/25/2021): This exam is significantly limited by motion and streak artifact. L posterior parietal lobe acute/subacute infarct is identified on current exam w/ remaining previously seen infarct possibly obscured by artifact. No CT evidence for new cortical hypoattentuation to indicate a new acute infarct. No definite acute intracranial hemorrhage or midline shift     Intracranial Vasculature   Head CTA (10/25/2021): No intracranial arterial large vessel occlusion or significant stenosis/occlusion    Head CTA (10/22/2021): demonstrates no aneurysm or stenosis of major intracranial arteries.    CT Perfusion (10/22/2021): focally decreased cerebral blood volume and cerebral blood flow in the posterior most left parieto-occipital region with corresponding slight increase in TTP possibly representing an area of hypoperfusion or core infarct.      Cervical Vasculature   CTA Neck (10/22/2021): Mild stenosis in the R vertebral artery w/ severe high-grade stenosis at the L vertebral artery origin    CTA Neck (10/22/2021): No stenosis of major cervical arteries     Echocardiogram TC reported no valvular vegetations and no pulmonary vein thrombosis per primary team.     EKG/Telemetry   Sinus tachycardia   Otherwise normal ECG   When compared with ECG of 21-OCT-2021 06:50,   ST no longer depressed in Anterior leads   ST no longer elevated in Lateral leads      Other Testing   EEG showed no epileptiform discharges x2.  Positive for diffuse encephalopathy x2.      LDL  No lab value available in past 30 days   A1C  No lab value available in past 30 days   Troponin No lab value available in past 48 hrs       Impression     # Acute to subacute ischemic stroke of bilateral cerebral hemispheres and left cerebellum with suspected cardioembolic etiology  # Encephalopathy with Lack of Reactivity on  Physical Exam    Pt's initial stroke code was called on 10/22 for change in physical exam. Per nursing report, the pt had previously been moving all extremities to the point of requiring restraints. However, pt suddenly stopped moving altogether on 10/21 ~7PM. No Stroke Code was called until the next morning. CTH revealed multiple likely subacute ischemic infarcts in the bilateral cerebral hemispheres and L cerebellar hemisphere while CTA Head/Neck was unrevealing. Also performed a CT Perfusion (10/22) which noted similar findings. At the time, sustained clonus was noted on exam, raising concerns for cervical myelopathy. However, this improved on subsequent exams and CT C-spine was unremarkable. The remainder of the stroke work-up was completed w/ MRI Brain again showing similar finding to CT images. At this time, raised concern that pt's noted strokes did not correspond well to his current deficits and that these were unlikely to be the cause of his change in physical exam. Obtained an EEG which only revealed diffuse encephalopathy. Pt was noted to have multiple metabolic/toxic derangements and so these were seen as the likely culprits. Has since been found to have an BRENNAN and Candidemia.    A second stroke code was called on 10/25 again for decreased reactivity on exam. Repeat CTH and CTA Head/Neck during the code were unrevealing. Exam appears mildly improved today but still seems too dense to be solely metabolic abnormalities and ongoing hypoxic/hypercapneic respiratory failure. Infection is highly suspect as he has begun to spike recurrent fevers. Per nursing report, his fevers broke on the evening of 10/27 and the pt has been afebrile sine. These fevers persisted despite empiric antibiotics, antivirals, and he is now on antifungals. Primary team in the process of determining source of infection. Also of concern is increased stroke burden not seen on CT imaging or potential for a broader cortical damage caused  by something such as anoxic brain injury or the potential like PRES. Repeat MRI with addendum stated that original known infarcts are progressing as expected; the findings on repeat MRI are less likely to be associated with PRES.      Recommendations:  - No further antimicrobial escalation, CNS infection unlikely per discussion with ID.  - Will continue to follow for CSF cultures.     Thank you for including neurology in the care of this patient.  Stroke neurology will follow peripherally. Please call with additional questions or concerns relating to stroke.     This patient was staffed with the attending stroke physician, Dr. Albarran.      Vincent Knox DO  Neurology Resident, PGY-1  Ph: *[phone]  ______________________________________________________    Clinically Significant Risk Factors Present on Admission                  Medications   Scheduled Meds    acetaminophen  975 mg Oral or Feeding Tube Q8H MARKY     acetylcysteine  2 mL Nebulization 4x Daily     acyclovir  400 mg Oral or NG Tube BID     amiodarone  200 mg Oral or Feeding Tube Daily     amLODIPine  10 mg Oral or Feeding Tube Daily     bumetanide  2 mg Intravenous BID     calcium carbonate 600 mg-vitamin D 400 units  1 tablet Oral or Feeding Tube BID w/meals     carvedilol  12.5 mg Oral or Feeding Tube BID     fiber modular (NUTRISOURCE FIBER)  2 packet Per Feeding Tube Daily     fluconazole  400 mg Oral or Feeding Tube Daily     hydrALAZINE  100 mg Oral or Feeding Tube Q6H     [START ON 10/31/2021] insulin glargine  20 Units Subcutaneous QAM AC     isosorbide dinitrate  40 mg Oral or Feeding Tube TID     levalbuterol  1.25 mg Nebulization 4x Daily     levothyroxine  25 mcg Oral Daily     lidocaine  2 patch Transdermal Q24H     lidocaine   Transdermal Q8H     meropenem  1 g Intravenous Q8H     multivitamins w/minerals  15 mL Per Feeding Tube Daily     mycophenolate  1,500 mg Oral or J tube BID     nystatin  1,000,000 Units Swish & Swallow 4x Daily      ofloxacin  5 drop Right Ear BID     pantoprazole  40 mg Per Feeding Tube BID AC     potassium chloride  20 mEq Oral Daily     [START ON 10/31/2021] predniSONE  15 mg Oral QAM    And     [START ON 10/31/2021] predniSONE  12.5 mg Oral QPM     predniSONE  15 mg Per Feeding Tube BID     protein modular  1 packet Per Feeding Tube TID     rosuvastatin  10 mg Oral or Feeding Tube Daily     senna-docusate  1 tablet Oral or Feeding Tube BID     sodium chloride (PF)  3 mL Intracatheter Q8H     sulfamethoxazole-trimethoprim  10 mL Oral or J tube Daily    Or     sulfamethoxazole-trimethoprim  1 tablet Oral or Feeding Tube Daily     tacrolimus  2 mg Per G Tube BID IS       Infusion Meds    dextrose       dextrose Stopped (10/24/21 1008)     heparin 850 Units/hr (10/30/21 1040)     insulin regular 6 Units/hr (10/30/21 1106)     BETA BLOCKER NOT PRESCRIBED         PRN Meds  bisacodyl, dextrose, dextrose, glucose **OR** dextrose **OR** glucagon, diphenhydrAMINE **OR** diphenhydrAMINE, labetalol, lidocaine 4%, lidocaine, lidocaine (buffered or not buffered), magnesium hydroxide, naloxone **OR** naloxone **OR** naloxone **OR** naloxone, ondansetron **OR** ondansetron, oxymetazoline, prochlorperazine **OR** prochlorperazine, BETA BLOCKER NOT PRESCRIBED, sodium chloride (PF)       PHYSICAL EXAMINATION  Temp:  [97.5  F (36.4  C)-100  F (37.8  C)] 97.5  F (36.4  C)  Pulse:  [] 85  Resp:  [16-30] 25  BP: ()/() 121/69  FiO2 (%):  [45 %-60 %] 50 %  SpO2:  [90 %-96 %] 95 %      Mental Status:  Unable to ascertain.  Trach in place with minimal discharge.   Cranial Nerves:  PERRL, +corneal reflex bilaterall, +cough and gag reflex. Face appears grossly symmetric, does occasionally resist eye opening/shining light into his eyes bilaterally.  Blink to threat in tact bilaterally.  Motor: No abnormal movements. Fails to withdraw to noxious stim or produce any other movements  Reflexes:  Reflexes brisk globally. Mute toes. Clonus  7-8 beats.    Sensory: Withdraws and grimaces to noxious stimuli in all 4 extremities.  Coordination:  Unable to assess  Station/Gait:  Deferred    Stroke Scales    NIHSS  Interval     Interval Comments stroke code (10/25/21 2124)   1a. Level of Consciousness 2-->Not alert, requires repeated stimulation to attend, or is obtunded and requires strong or painful stimulation to make movements (not stereotyped)   1b. LOC Questions 2-->Answers neither question correctly   1c. LOC Commands 2-->Performs neither task correctly   2.   Best Gaze 0-->Normal   3.   Visual  (BRAYDEN)   4.   Facial Palsy  (BRAYDEN)   5a. Motor Arm, Left 4-->No movement   5b. Motor Arm, Right 4-->No movement   6a. Motor Leg, Left 3-->No effort against gravity, leg falls to bed immediately   6b. Motor Leg, right 3-->No effort against gravity, leg falls to bed immediately   7.   Limb Ataxia  (BRAYDEN)   8.   Sensory  (BRAYDEN)   9.   Best Language  (BRAYDEN)   10. Dysarthria  (BRAYDEN)   11. Extinction and Inattention   (BRAYDEN)   Total 33 (10/25/21 2124)       Imaging  I personally reviewed all imaging; relevant findings per HPI.     Lab Results Data   CBC  Recent Labs   Lab 10/30/21  0944 10/30/21  0503 10/29/21  0542   WBC 37.5* 36.1* 38.8*   RBC 2.72* 2.81* 3.04*   HGB 8.5* 8.6* 9.4*   HCT 27.0* 28.0* 30.4*    207 220     Basic Metabolic Panel    Recent Labs   Lab 10/30/21  1105 10/30/21  0956 10/30/21  0804 10/30/21  0514 10/30/21  0503 10/29/21  0627 10/29/21  0542 10/28/21  0607 10/28/21  0530   NA  --   --   --   --  142  --  139  --  143   POTASSIUM  --   --   --   --  3.7  --  4.6  --  5.0   CHLORIDE  --   --   --   --  109  --  107  --  110*   CO2  --   --   --   --  26  --  26  --  30   BUN  --   --   --   --  93*  --  72*  --  60*   CR  --   --   --   --  1.25  --  1.23  --  1.04   * 166* 133*   < > 160*   < > 128*   < > 169*   ERIK  --   --   --   --  8.9  --  9.0  --  9.0    < > = values in this interval not displayed.     Liver Panel  Recent Labs    Lab 10/30/21  0503 10/29/21  0542 10/28/21  0530   PROTTOTAL 5.7* 5.8* 5.9*   ALBUMIN 1.6* 1.0* 1.7*   BILITOTAL 0.2 0.3 0.7   ALKPHOS 108 106 130   AST 17 20 21   ALT 29 35 47     INR    Recent Labs   Lab Test 10/30/21  0503 10/29/21  0542 10/28/21  0530   INR 1.10 1.13 1.13      Lipid Profile    Recent Labs   Lab Test 10/22/21  0407 10/21/21  0354 10/20/21  0308 10/19/21  0338 09/07/21  1324   CHOL  --   --   --   --  214*   HDL  --   --   --   --  51   LDL  --   --   --   --  130*   TRIG 307* 486* 383*   < > 364*    < > = values in this interval not displayed.     A1C    Recent Labs   Lab Test 09/07/21  0928 09/05/21  1901   A1C 6.6* 6.5*     Troponin I    No results for input(s): TROPONIN, GHTROP in the last 168 hours.

## 2021-10-30 NOTE — PLAN OF CARE
Major Shift Events:  Pt spontaneously moves bilateral lower extremities and withdraws to painful stimulus, no movement of upper extremities, pt opens eyes and turns head to side voice is on, doesn't follow any other commands.  Pt's pupil exam is slightly different this afternoon than earlier today, both pupils 4, reactive and very brisk but they will also redialate when light still present, right quicker than left, CVTS updated, no new orders.  Pt SR-ST, BP WNL.  Vent settings changed on rounds today, PS for a short time this am, pt was not pulling big enough volume, lung sounds course throughout, thick red/rust secretions, bronch done today.  Watson with adequate output, had a 4 hour period of minimal output but it picked back up after that.  Rectal pouch in place, pt still having loose stools.      Plan: Continue to try to pressure support.    For vital signs and complete assessments, please see documentation flowsheets.    Problem: Adult Inpatient Plan of Care  Goal: Optimal Comfort and Wellbeing  Outcome: No Change

## 2021-10-30 NOTE — PROGRESS NOTES
Interval Progress Note:    Notified by nursing that patient's pupils were unequal per the pupilometer whereas they had reportedly been equal prior.  On evaluation of the patient, pupils appeared roughly equal when examined with a light source.  Pupils reacted appropriately to light.  The patient was actually trying to strongly close his eyes when the light was shined in them.  He was reactive to noxious stimuli with regard to facial expression and movement and would actively turn his face away from the light.  He withdrew to pain in the BLEs.  It appears that his neuro exam has overall been roughly the same.  Will continue to monitor.        Oscar Smith MD on 10/30/2021 at 1:43 AM

## 2021-10-30 NOTE — PROGRESS NOTES
"Bronchoscopy Risk Assessment Guidelines      A. Patient symptoms to consider when assessing pulmonary TB risk are:    I. Cough greater than 3 weeks; and fever, hemoptysis, pleuritic chest    pain, weight loss greater than 10 lbs, night sweats, fatigue, infiltrates on    upper lobes or superior segments of lower lobes, cavitation on chest    x-ray.   B. Patient risk factors to consider when assessing pulmonary TB risk are:    I. Exposure to known TB case, foreign-born persons (within 5 years of    arrival to [place]), residence in a crowded setting (correctional facility,     long-term care center, etc.), persons with HIV or immunosuppression.    Patients with symptoms and risk factors should generally be considered \"suspect risk\" and bronchoscopies should be performed in airborne precautions.    This patient has NO KNOWN RISK of Tuberculosis (proceed with bronchoscopy)    Specimens sent: yes  Complications: None  Scope used: #[id number] [id number]  Attending Physician: Dr. Kelli Felipe, RT on 10/30/2021 at 3:34 PM  "

## 2021-10-30 NOTE — PLAN OF CARE
Pertinent PMH: Edson is being followed by CVTS who was initially admitted on 09/05 for ILD and transplant w/o. 10/16 Pt underwent a BSLT c/b CVA/encephalopathy on 10/22 as well as candidia & fungal infections, BRENNAN and respiratory failure requiring trach & PEG placement on 10/28.  Major Shift Events: Pupils unequal per pupilometer, CVTS notified. Pupils reactive to light. Withdrawals to pain in the lower extremities. Does not follow commands. CMV-AC settings on vent at 14/50%/400/5, red thick secretions from trach. SR, HR in 80's, frequently bradys down to 50's and resolves on its own. MAP 65 - 80. TF at 45mL/HR and 75 free water Q4Hr. G-tube to gravity with 35 mL OP. TF to J-tube. Insulin gtt ranges from 2 - 10 U/Hr. UOP of 100mL/Hr, large loose incontinent stool in rectal pouch. All dressings changed.  Plan: Neuro, ENT, transplant following. Possible need for repeat brain MR once stable in addition to a repeat LP. Continue to monitor.    For vital signs, hemodynamics and complete assessments, please see documentation flowsheets.

## 2021-10-31 ENCOUNTER — APPOINTMENT (OUTPATIENT)
Dept: GENERAL RADIOLOGY | Facility: CLINIC | Age: 56
End: 2021-10-31
Attending: STUDENT IN AN ORGANIZED HEALTH CARE EDUCATION/TRAINING PROGRAM
Payer: COMMERCIAL

## 2021-10-31 LAB
ALBUMIN SERPL-MCNC: 1.6 G/DL (ref 3.4–5)
ALP SERPL-CCNC: 127 U/L (ref 40–150)
ALT SERPL W P-5'-P-CCNC: 33 U/L (ref 0–70)
ANION GAP SERPL CALCULATED.3IONS-SCNC: 6 MMOL/L (ref 3–14)
ANION GAP SERPL CALCULATED.3IONS-SCNC: 7 MMOL/L (ref 3–14)
AST SERPL W P-5'-P-CCNC: 20 U/L (ref 0–45)
BASE EXCESS BLDA CALC-SCNC: 7.3 MMOL/L (ref -9–1.8)
BASOPHILS # BLD AUTO: 0.1 10E3/UL (ref 0–0.2)
BASOPHILS NFR BLD AUTO: 0 %
BILIRUB SERPL-MCNC: 0.2 MG/DL (ref 0.2–1.3)
BUN SERPL-MCNC: 102 MG/DL (ref 7–30)
BUN SERPL-MCNC: 108 MG/DL (ref 7–30)
CALCIUM SERPL-MCNC: 9.2 MG/DL (ref 8.5–10.1)
CALCIUM SERPL-MCNC: 9.4 MG/DL (ref 8.5–10.1)
CHLORIDE BLD-SCNC: 109 MMOL/L (ref 94–109)
CHLORIDE BLD-SCNC: 112 MMOL/L (ref 94–109)
CO2 SERPL-SCNC: 28 MMOL/L (ref 20–32)
CO2 SERPL-SCNC: 30 MMOL/L (ref 20–32)
CREAT SERPL-MCNC: 1.13 MG/DL (ref 0.66–1.25)
CREAT SERPL-MCNC: 1.34 MG/DL (ref 0.66–1.25)
EOSINOPHIL # BLD AUTO: 0 10E3/UL (ref 0–0.7)
EOSINOPHIL NFR BLD AUTO: 0 %
ERYTHROCYTE [DISTWIDTH] IN BLOOD BY AUTOMATED COUNT: 16.9 % (ref 10–15)
GFR SERPL CREATININE-BSD FRML MDRD: 59 ML/MIN/1.73M2
GFR SERPL CREATININE-BSD FRML MDRD: 72 ML/MIN/1.73M2
GLUCOSE BLD-MCNC: 114 MG/DL (ref 70–99)
GLUCOSE BLD-MCNC: 138 MG/DL (ref 70–99)
GLUCOSE BLDC GLUCOMTR-MCNC: 117 MG/DL (ref 70–99)
GLUCOSE BLDC GLUCOMTR-MCNC: 119 MG/DL (ref 70–99)
GLUCOSE BLDC GLUCOMTR-MCNC: 120 MG/DL (ref 70–99)
GLUCOSE BLDC GLUCOMTR-MCNC: 124 MG/DL (ref 70–99)
GLUCOSE BLDC GLUCOMTR-MCNC: 125 MG/DL (ref 70–99)
GLUCOSE BLDC GLUCOMTR-MCNC: 127 MG/DL (ref 70–99)
GLUCOSE BLDC GLUCOMTR-MCNC: 129 MG/DL (ref 70–99)
GLUCOSE BLDC GLUCOMTR-MCNC: 130 MG/DL (ref 70–99)
GLUCOSE BLDC GLUCOMTR-MCNC: 133 MG/DL (ref 70–99)
GLUCOSE BLDC GLUCOMTR-MCNC: 134 MG/DL (ref 70–99)
GLUCOSE BLDC GLUCOMTR-MCNC: 137 MG/DL (ref 70–99)
GLUCOSE BLDC GLUCOMTR-MCNC: 140 MG/DL (ref 70–99)
GLUCOSE BLDC GLUCOMTR-MCNC: 144 MG/DL (ref 70–99)
GLUCOSE BLDC GLUCOMTR-MCNC: 145 MG/DL (ref 70–99)
GLUCOSE BLDC GLUCOMTR-MCNC: 145 MG/DL (ref 70–99)
GLUCOSE BLDC GLUCOMTR-MCNC: 146 MG/DL (ref 70–99)
GLUCOSE BLDC GLUCOMTR-MCNC: 149 MG/DL (ref 70–99)
GLUCOSE BLDC GLUCOMTR-MCNC: 151 MG/DL (ref 70–99)
GLUCOSE BLDC GLUCOMTR-MCNC: 155 MG/DL (ref 70–99)
GLUCOSE BLDC GLUCOMTR-MCNC: 164 MG/DL (ref 70–99)
HCO3 BLD-SCNC: 31 MMOL/L (ref 21–28)
HCT VFR BLD AUTO: 26.9 % (ref 40–53)
HGB BLD-MCNC: 8.2 G/DL (ref 13.3–17.7)
HOLD SPECIMEN: NORMAL
HOLD SPECIMEN: NORMAL
IMM GRANULOCYTES # BLD: 0.8 10E3/UL
IMM GRANULOCYTES NFR BLD: 3 %
INR PPP: 1.17 (ref 0.85–1.15)
IRON SATN MFR SERPL: 11 % (ref 15–46)
IRON SERPL-MCNC: 35 UG/DL (ref 35–180)
LYMPHOCYTES # BLD AUTO: 0.9 10E3/UL (ref 0.8–5.3)
LYMPHOCYTES NFR BLD AUTO: 3 %
MAGNESIUM SERPL-MCNC: 2.2 MG/DL (ref 1.6–2.3)
MCH RBC QN AUTO: 30.8 PG (ref 26.5–33)
MCHC RBC AUTO-ENTMCNC: 30.5 G/DL (ref 31.5–36.5)
MCV RBC AUTO: 101 FL (ref 78–100)
MONOCYTES # BLD AUTO: 1.3 10E3/UL (ref 0–1.3)
MONOCYTES NFR BLD AUTO: 5 %
NEUTROPHILS # BLD AUTO: 23.6 10E3/UL (ref 1.6–8.3)
NEUTROPHILS NFR BLD AUTO: 89 %
NRBC # BLD AUTO: 0.1 10E3/UL
NRBC BLD AUTO-RTO: 0 /100
O2/TOTAL GAS SETTING VFR VENT: 50 %
PCO2 BLD: 41 MM HG (ref 35–45)
PH BLD: 7.49 [PH] (ref 7.35–7.45)
PHOSPHATE SERPL-MCNC: 4 MG/DL (ref 2.5–4.5)
PLATELET # BLD AUTO: 196 10E3/UL (ref 150–450)
PO2 BLD: 87 MM HG (ref 80–105)
POTASSIUM BLD-SCNC: 3.7 MMOL/L (ref 3.4–5.3)
POTASSIUM BLD-SCNC: 4.3 MMOL/L (ref 3.4–5.3)
PROT SERPL-MCNC: 5.7 G/DL (ref 6.8–8.8)
RBC # BLD AUTO: 2.66 10E6/UL (ref 4.4–5.9)
SODIUM SERPL-SCNC: 146 MMOL/L (ref 133–144)
SODIUM SERPL-SCNC: 146 MMOL/L (ref 133–144)
TACROLIMUS BLD-MCNC: 9.5 UG/L (ref 5–15)
TIBC SERPL-MCNC: 309 UG/DL (ref 240–430)
TME LAST DOSE: NORMAL H
TME LAST DOSE: NORMAL H
TRANSFERRIN SERPL-MCNC: 216 MG/DL (ref 210–360)
UFH PPP CHRO-ACNC: 0.34 IU/ML
UFH PPP CHRO-ACNC: 0.35 IU/ML
WBC # BLD AUTO: 26.6 10E3/UL (ref 4–11)

## 2021-10-31 PROCEDURE — 85520 HEPARIN ASSAY: CPT | Performed by: THORACIC SURGERY (CARDIOTHORACIC VASCULAR SURGERY)

## 2021-10-31 PROCEDURE — 94640 AIRWAY INHALATION TREATMENT: CPT | Mod: 76

## 2021-10-31 PROCEDURE — 250N000012 HC RX MED GY IP 250 OP 636 PS 637: Performed by: STUDENT IN AN ORGANIZED HEALTH CARE EDUCATION/TRAINING PROGRAM

## 2021-10-31 PROCEDURE — 82803 BLOOD GASES ANY COMBINATION: CPT | Performed by: STUDENT IN AN ORGANIZED HEALTH CARE EDUCATION/TRAINING PROGRAM

## 2021-10-31 PROCEDURE — 250N000013 HC RX MED GY IP 250 OP 250 PS 637: Performed by: STUDENT IN AN ORGANIZED HEALTH CARE EDUCATION/TRAINING PROGRAM

## 2021-10-31 PROCEDURE — 99233 SBSQ HOSP IP/OBS HIGH 50: CPT | Mod: 24 | Performed by: INTERNAL MEDICINE

## 2021-10-31 PROCEDURE — 71045 X-RAY EXAM CHEST 1 VIEW: CPT

## 2021-10-31 PROCEDURE — 84466 ASSAY OF TRANSFERRIN: CPT | Performed by: STUDENT IN AN ORGANIZED HEALTH CARE EDUCATION/TRAINING PROGRAM

## 2021-10-31 PROCEDURE — 36600 WITHDRAWAL OF ARTERIAL BLOOD: CPT

## 2021-10-31 PROCEDURE — 71045 X-RAY EXAM CHEST 1 VIEW: CPT | Mod: 26 | Performed by: RADIOLOGY

## 2021-10-31 PROCEDURE — 250N000013 HC RX MED GY IP 250 OP 250 PS 637: Performed by: NURSE PRACTITIONER

## 2021-10-31 PROCEDURE — 999N000157 HC STATISTIC RCP TIME EA 10 MIN

## 2021-10-31 PROCEDURE — 84100 ASSAY OF PHOSPHORUS: CPT | Performed by: THORACIC SURGERY (CARDIOTHORACIC VASCULAR SURGERY)

## 2021-10-31 PROCEDURE — 94003 VENT MGMT INPAT SUBQ DAY: CPT

## 2021-10-31 PROCEDURE — 80197 ASSAY OF TACROLIMUS: CPT | Performed by: STUDENT IN AN ORGANIZED HEALTH CARE EDUCATION/TRAINING PROGRAM

## 2021-10-31 PROCEDURE — 99291 CRITICAL CARE FIRST HOUR: CPT | Mod: 24 | Performed by: STUDENT IN AN ORGANIZED HEALTH CARE EDUCATION/TRAINING PROGRAM

## 2021-10-31 PROCEDURE — 250N000009 HC RX 250: Performed by: STUDENT IN AN ORGANIZED HEALTH CARE EDUCATION/TRAINING PROGRAM

## 2021-10-31 PROCEDURE — 85025 COMPLETE CBC W/AUTO DIFF WBC: CPT | Performed by: STUDENT IN AN ORGANIZED HEALTH CARE EDUCATION/TRAINING PROGRAM

## 2021-10-31 PROCEDURE — 82040 ASSAY OF SERUM ALBUMIN: CPT | Performed by: STUDENT IN AN ORGANIZED HEALTH CARE EDUCATION/TRAINING PROGRAM

## 2021-10-31 PROCEDURE — 94640 AIRWAY INHALATION TREATMENT: CPT

## 2021-10-31 PROCEDURE — 99232 SBSQ HOSP IP/OBS MODERATE 35: CPT | Mod: GC | Performed by: PSYCHIATRY & NEUROLOGY

## 2021-10-31 PROCEDURE — 83735 ASSAY OF MAGNESIUM: CPT | Performed by: THORACIC SURGERY (CARDIOTHORACIC VASCULAR SURGERY)

## 2021-10-31 PROCEDURE — 36415 COLL VENOUS BLD VENIPUNCTURE: CPT | Performed by: STUDENT IN AN ORGANIZED HEALTH CARE EDUCATION/TRAINING PROGRAM

## 2021-10-31 PROCEDURE — 250N000011 HC RX IP 250 OP 636: Performed by: STUDENT IN AN ORGANIZED HEALTH CARE EDUCATION/TRAINING PROGRAM

## 2021-10-31 PROCEDURE — 83550 IRON BINDING TEST: CPT | Performed by: STUDENT IN AN ORGANIZED HEALTH CARE EDUCATION/TRAINING PROGRAM

## 2021-10-31 PROCEDURE — 250N000013 HC RX MED GY IP 250 OP 250 PS 637: Performed by: THORACIC SURGERY (CARDIOTHORACIC VASCULAR SURGERY)

## 2021-10-31 PROCEDURE — 36415 COLL VENOUS BLD VENIPUNCTURE: CPT | Performed by: THORACIC SURGERY (CARDIOTHORACIC VASCULAR SURGERY)

## 2021-10-31 PROCEDURE — 85610 PROTHROMBIN TIME: CPT | Performed by: STUDENT IN AN ORGANIZED HEALTH CARE EDUCATION/TRAINING PROGRAM

## 2021-10-31 PROCEDURE — 250N000011 HC RX IP 250 OP 636

## 2021-10-31 PROCEDURE — 94668 MNPJ CHEST WALL SBSQ: CPT

## 2021-10-31 PROCEDURE — 200N000002 HC R&B ICU UMMC

## 2021-10-31 PROCEDURE — 250N000012 HC RX MED GY IP 250 OP 636 PS 637: Performed by: NURSE PRACTITIONER

## 2021-10-31 RX ORDER — POTASSIUM CHLORIDE 20MEQ/15ML
20 LIQUID (ML) ORAL ONCE
Status: COMPLETED | OUTPATIENT
Start: 2021-10-31 | End: 2021-10-31

## 2021-10-31 RX ORDER — OFLOXACIN 3 MG/ML
5 SOLUTION AURICULAR (OTIC) 2 TIMES DAILY
Status: COMPLETED | OUTPATIENT
Start: 2021-10-31 | End: 2021-11-02

## 2021-10-31 RX ADMIN — Medication 40 MG: at 08:26

## 2021-10-31 RX ADMIN — Medication 2 PACKET: at 08:29

## 2021-10-31 RX ADMIN — OFLOXACIN 5 DROP: 3 SOLUTION AURICULAR (OTIC) at 20:25

## 2021-10-31 RX ADMIN — AMLODIPINE BESYLATE 10 MG: 10 TABLET ORAL at 08:03

## 2021-10-31 RX ADMIN — ACETYLCYSTEINE 2 ML: 200 SOLUTION ORAL; RESPIRATORY (INHALATION) at 19:59

## 2021-10-31 RX ADMIN — CARVEDILOL 12.5 MG: 6.25 TABLET, FILM COATED ORAL at 08:04

## 2021-10-31 RX ADMIN — POTASSIUM CHLORIDE 20 MEQ: 40 SOLUTION ORAL at 08:03

## 2021-10-31 RX ADMIN — LEVALBUTEROL HYDROCHLORIDE 1.25 MG: 1.25 SOLUTION RESPIRATORY (INHALATION) at 12:17

## 2021-10-31 RX ADMIN — MEROPENEM 1 G: 1 INJECTION, POWDER, FOR SOLUTION INTRAVENOUS at 00:14

## 2021-10-31 RX ADMIN — FLUCONAZOLE 400 MG: 200 TABLET ORAL at 08:03

## 2021-10-31 RX ADMIN — Medication 1 PACKET: at 08:28

## 2021-10-31 RX ADMIN — ACETAMINOPHEN 975 MG: 325 TABLET, FILM COATED ORAL at 10:09

## 2021-10-31 RX ADMIN — Medication 1 PACKET: at 20:25

## 2021-10-31 RX ADMIN — HUMAN INSULIN 5 UNITS/HR: 100 INJECTION, SOLUTION SUBCUTANEOUS at 00:01

## 2021-10-31 RX ADMIN — TACROLIMUS 2 MG: 5 CAPSULE ORAL at 17:55

## 2021-10-31 RX ADMIN — Medication 40 MG: at 16:08

## 2021-10-31 RX ADMIN — ACETAMINOPHEN 975 MG: 325 TABLET, FILM COATED ORAL at 17:55

## 2021-10-31 RX ADMIN — HEPARIN SODIUM 850 UNITS/HR: 10000 INJECTION, SOLUTION INTRAVENOUS at 13:14

## 2021-10-31 RX ADMIN — ACETYLCYSTEINE 2 ML: 200 SOLUTION ORAL; RESPIRATORY (INHALATION) at 15:31

## 2021-10-31 RX ADMIN — ACETYLCYSTEINE 2 ML: 200 SOLUTION ORAL; RESPIRATORY (INHALATION) at 12:17

## 2021-10-31 RX ADMIN — LEVALBUTEROL HYDROCHLORIDE 1.25 MG: 1.25 SOLUTION RESPIRATORY (INHALATION) at 15:31

## 2021-10-31 RX ADMIN — NYSTATIN 1000000 UNITS: 500000 SUSPENSION ORAL at 16:08

## 2021-10-31 RX ADMIN — OFLOXACIN 5 DROP: 3 SOLUTION AURICULAR (OTIC) at 08:26

## 2021-10-31 RX ADMIN — PREDNISONE 12.5 MG: 2.5 TABLET ORAL at 17:55

## 2021-10-31 RX ADMIN — CALCIUM CARBONATE 600 MG (1,500 MG)-VITAMIN D3 400 UNIT TABLET 1 TABLET: at 08:03

## 2021-10-31 RX ADMIN — MULTIVIT AND MINERALS-FERROUS GLUCONATE 9 MG IRON/15 ML ORAL LIQUID 15 ML: at 08:03

## 2021-10-31 RX ADMIN — MEROPENEM 1 G: 1 INJECTION, POWDER, FOR SOLUTION INTRAVENOUS at 16:08

## 2021-10-31 RX ADMIN — ROSUVASTATIN CALCIUM 10 MG: 10 TABLET, FILM COATED ORAL at 08:03

## 2021-10-31 RX ADMIN — PREDNISONE 15 MG: 5 TABLET ORAL at 08:04

## 2021-10-31 RX ADMIN — ACETYLCYSTEINE 2 ML: 200 SOLUTION ORAL; RESPIRATORY (INHALATION) at 08:51

## 2021-10-31 RX ADMIN — ACYCLOVIR 400 MG: 200 SUSPENSION ORAL at 08:30

## 2021-10-31 RX ADMIN — POTASSIUM CHLORIDE 20 MEQ: 40 SOLUTION ORAL at 08:02

## 2021-10-31 RX ADMIN — LEVALBUTEROL HYDROCHLORIDE 1.25 MG: 1.25 SOLUTION RESPIRATORY (INHALATION) at 19:58

## 2021-10-31 RX ADMIN — Medication 1 PACKET: at 13:57

## 2021-10-31 RX ADMIN — ACETAMINOPHEN 975 MG: 325 TABLET, FILM COATED ORAL at 02:22

## 2021-10-31 RX ADMIN — CALCIUM CARBONATE 600 MG (1,500 MG)-VITAMIN D3 400 UNIT TABLET 1 TABLET: at 17:55

## 2021-10-31 RX ADMIN — ACYCLOVIR 400 MG: 200 SUSPENSION ORAL at 20:25

## 2021-10-31 RX ADMIN — CARVEDILOL 12.5 MG: 6.25 TABLET, FILM COATED ORAL at 20:25

## 2021-10-31 RX ADMIN — MYCOPHENOLATE MOFETIL 1500 MG: 200 POWDER, FOR SUSPENSION ORAL at 20:25

## 2021-10-31 RX ADMIN — LEVALBUTEROL HYDROCHLORIDE 1.25 MG: 1.25 SOLUTION RESPIRATORY (INHALATION) at 08:51

## 2021-10-31 RX ADMIN — MYCOPHENOLATE MOFETIL 1500 MG: 200 POWDER, FOR SUSPENSION ORAL at 08:26

## 2021-10-31 RX ADMIN — MEROPENEM 1 G: 1 INJECTION, POWDER, FOR SOLUTION INTRAVENOUS at 08:03

## 2021-10-31 RX ADMIN — LEVOTHYROXINE SODIUM 25 MCG: 0.03 TABLET ORAL at 08:03

## 2021-10-31 RX ADMIN — AMIODARONE HYDROCHLORIDE 200 MG: 200 TABLET ORAL at 08:04

## 2021-10-31 RX ADMIN — TACROLIMUS 2 MG: 5 CAPSULE ORAL at 08:26

## 2021-10-31 RX ADMIN — NYSTATIN 1000000 UNITS: 500000 SUSPENSION ORAL at 08:02

## 2021-10-31 RX ADMIN — NYSTATIN 1000000 UNITS: 500000 SUSPENSION ORAL at 20:25

## 2021-10-31 RX ADMIN — SULFAMETHOXAZOLE AND TRIMETHOPRIM 80 MG: 200; 40 SUSPENSION ORAL at 08:26

## 2021-10-31 RX ADMIN — LIDOCAINE 2 PATCH: 560 PATCH PERCUTANEOUS; TOPICAL; TRANSDERMAL at 08:02

## 2021-10-31 RX ADMIN — NYSTATIN 1000000 UNITS: 500000 SUSPENSION ORAL at 12:37

## 2021-10-31 ASSESSMENT — ACTIVITIES OF DAILY LIVING (ADL)
ADLS_ACUITY_SCORE: 22
ADLS_ACUITY_SCORE: 24
ADLS_ACUITY_SCORE: 22
ADLS_ACUITY_SCORE: 22
ADLS_ACUITY_SCORE: 24
ADLS_ACUITY_SCORE: 24
ADLS_ACUITY_SCORE: 22
ADLS_ACUITY_SCORE: 24
ADLS_ACUITY_SCORE: 22

## 2021-10-31 ASSESSMENT — MIFFLIN-ST. JEOR: SCORE: 1438

## 2021-10-31 NOTE — PROGRESS NOTES
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 Corral.  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    OVERNIGHT EVENTS:  Pupil asymmetry noted with pupillometer, evaluated by moonlighter with no evidence of neuro changes, similar overall to event the previous night; see note from Dr. Centeno.    TODAY'S PROGRESS:   - increase lantus   - hold diuresis this AM  - holding hydral and isordil, follow BP   - iron studies     PLAN:  Neuro/ pain/ sedation:  Acute postoperative pain  Anxiety and depression  Acute to subacute CVA, b/l cerebral hemispheres and L cerebellum, suspect embolic  Encephalopathy and diffuse weakness  R ear lateral canal floor excoriation with bleeding     - Pain and sedation: none  - scheduled APAP, add lido patch   - appreciate ENT, continue otowicks, floxin drops x5d, afrin prn   - appreciate neuro    Pulmonary care:   SALTY on home CPAP  Acute hypoxic respiratory failure s/p b/l lung transplant 10/16/21  S/p tracheostomy 10/29  - Vent: CMV, 14/400/8/wean  - Levalbuterol nebs and Mucomyst, chest PT  - Daily CXR  - PST BID  - diuresis as below   - appreciate Pulmonary     Cardiovascular:    HTN  Afib   PFO  Cardiogenic shock followed by severe HTN - now normotensive    - Monitor hemodynamic status.   - MAP goal <100, SBP <140  - Amlodipine 10 mg daily, hydralazine hold today, isordil hold today, coreg 12.5 BID   - prn labetalol  - rosuvastatin 10mg  - Amiodarone 200 mg daily   - low-intensity heparin gtt     GI care/ Nutrition:   Elevated LFTs - resolved   GI bleed 2/2 NG trauma per GI, resolved   Moderate malnutrition in the context of acute on chronic illness  - Diet: TF at goal via PEG-J,  added fiber, will monitor  - PPI BID  - prn bowel regimen     Renal/ Fluid Balance/ Electrolytes:   Acute kidney injury, recurrent   Hypernatremia, improved with FWF  BL creat appears to be ~ 0.7  - hold diuresis this AM, reevaluate this afternoon    - 2pm BMP   - free water flushes 75 q4  - Strict I/O, daily weights  - Avoid/limit nephrotoxins as able  - Replete lytes PRN per protocol     Endocrine:    Stress induced hyperglycemia with steroid-induced component, on DM2  Hypothyroidism  RA  Preop A1c 6.6  - insulin gtt, lantus 30u today  - Goal BG <180 for optimal healing  - continue home synthroid     ID/ Antibiotics:  Immunosuppression s/p transplant  Candidal infection of donor lungs (likely source), pleural fluid, and fungemia   - NGTD CSF. Last + blood cx 10/22. No vegetations on valves per TC 10/23  - continue fluconazole pending clinical course  - empiric meropenem x5d  - Nystatin, Acyclovir, bactrim   - Tacrolimus (via g tube), prednisone   - Follow cultures   - appreciate transplant ID, ophthalmology   - Continue to monitor fever curve, WBC and inflammatory markers as appropriate     Heme:     Acute blood loss anemia and anemia related to critical illness   Hypogammaglobulinemia s/p IVIG  Thrombocytopenia, HIT negative - resolved   No s/sx active bleeding  - CBC   - ID as above  - iron studies     MSK/ Skin:  Clamshell surgical incision  - Sternal precautions  - Postoperative incision management per protocol  - PT/OT/CR  - reassess staples in AM (at 2 weeks)      Prophylaxis:    - Mechanical prophylaxis for DVT: SCDs  - Chemical DVT prophylaxis: heparin gtt    - PPI for 30 days postoperatively     Lines/ tubes/ drains:  - trach  - peg-j  - Watson 10/25    - PIVs    Disposition:  - [facility] ICU.      Patient seen, findings and plan discussed with [facility] ICU staff.     Altagracia Allan MD  Surgery Resident PGY-3  Pg [phone]    ====================================    SUBJECTIVE:   Concern for unequal pupils overnight but neuro  exam unchanged.  Following some commands per RN     OBJECTIVE:   1. VITAL SIGNS:   Temp:  [96.3  F (35.7  C)-99.3  F (37.4  C)] 98.6  F (37  C)  Pulse:  [] 54  Resp:  [18-30] 20  BP: ()/(46-80) 138/80  FiO2 (%):  [50 %-60 %] 50 %  SpO2:  [91 %-99 %] 98 %  Ventilation Mode: CMV/AC  (Continuous Mandatory Ventilation/ Assist Control)  FiO2 (%): 50 %  Rate Set (breaths/minute): 14 breaths/min  Tidal Volume Set (mL): 400 mL  PEEP (cm H2O): 8 cmH2O  Oxygen Concentration (%): 50 %  Resp: 20    2. INTAKE/ OUTPUT:   I/O last 3 completed shifts:  In: 2303.43 [I.V.:608.43; NG/GT:615]  Out: 2300 [Urine:1830; Emesis/NG output:70; Stool:400]    3. PHYSICAL EXAMINATION:   General: critically ill  Neuro: eyes closed, mouth closed  Resp: vented via trach, overbreathing vent  CV: RRR on tele   Abdomen: nondistended  Incisions: c/d/i  Extremities: warm and well perfused, no edema    4. INVESTIGATIONS:   Arterial Blood Gases   Recent Labs   Lab 10/31/21  0530 10/30/21  0531 10/29/21  0502 10/28/21  0502   PH 7.49* 7.52* 7.53* 7.51*   PCO2 41 37 37 37   PO2 87 77* 97 114*   HCO3 31* 30* 31* 30*     Complete Blood Count   Recent Labs   Lab 10/31/21  0610 10/30/21  0944 10/30/21  0503 10/29/21  0542   WBC 26.6* 37.5* 36.1* 38.8*   HGB 8.2* 8.5* 8.6* 9.4*    274 207 220     Basic Metabolic Panel  Recent Labs   Lab 10/31/21  0650 10/31/21  0610 10/31/21  0544 10/31/21  0444 10/30/21  0956 10/30/21  0945 10/30/21  0514 10/30/21  0503 10/29/21  0627 10/29/21  0542 10/28/21  0607 10/28/21  0530   NA  --  146*  --   --   --   --   --  142  --  139  --  143   POTASSIUM  --  3.7  --   --   --  4.0  --  3.7  --  4.6   < > 5.0   CHLORIDE  --  109  --   --   --   --   --  109  --  107  --  110*   CO2  --  30  --   --   --   --   --  26  --  26  --  30   BUN  --  108*  --   --   --   --   --  93*  --  72*  --  60*   CR  --  1.34*  --   --   --   --   --  1.25  --  1.23  --  1.04   * 114* 120* 124*   < >  --    < > 160*   < >  128*   < > 169*    < > = values in this interval not displayed.     Liver Function Tests  Recent Labs   Lab 10/31/21  0610 10/30/21  0503 10/29/21  0542 10/28/21  0530 10/27/21  0523 10/27/21  0523   AST 20 17 20 21   < > 27   ALT 33 29 35 47   < > 71*   ALKPHOS 127 108 106 130   < > 160*   BILITOTAL 0.2 0.2 0.3 0.7   < > 0.3   ALBUMIN 1.6* 1.6* 1.0* 1.7*   < > 1.8*   INR  --  1.10 1.13 1.13  --  1.16*    < > = values in this interval not displayed.     Pancreatic Enzymes  No lab results found in last 7 days.  Coagulation Profile  Recent Labs   Lab 10/30/21  0503 10/29/21  0542 10/28/21  0530 10/27/21  0523   INR 1.10 1.13 1.13 1.16*        =========================================  Critical Care Services Progress Note:     Edson Thornton remains critically ill with mental status changes and post-operative respiratory failure     I personally examined and evaluated the patient today.   The patient s prognosis today is unchanged  I have evaluated all laboratory values and imaging studies from the past 24 hours.  Key findings and decisions made today included overall now following commands by wiggling toes, slight improvement of mental status which remains hopeful. Gram + cocci in clusters on bronch gm stain no hx of MRSA swab likely covered with meropenem will continue to follow  I personally managed the ventilator, pain control and analgesia, metabolic abnormalities, antibiotic therapy and vasoactive medications.   Consults ongoing and ordered are none  Procedures that will happen today are: none  All treatments were placed at my direction.  I formulated today s plan with the house staff team or resident(s) and agree with the findings and plan in the associated note.       The above plans and care have been discussed with none and all questions and concerns were addressed.     I spent a total of 35 minutes (excluding procedure time) personally providing and directing critical care services at the bedside and on the  critical care unit for Edson Thornton.        Ronnie Ron MD

## 2021-10-31 NOTE — PLAN OF CARE
Pertinent PMH: Edson is being followed by CVTS who was initially admitted on 09/05 for ILD and transplant w/o. 10/16 Pt underwent a BSLT c/b CVA/encephalopathy on 10/22 as well as candidia & fungal infections, BRENNAN and respiratory failure requiring trach & PEG placement on 10/28.  Major Shift Events: Pupils equal and reactive. Withdrawals to pain in the lower extremities. Does not follow commands, team aware and neurology following. CMV-AC settings on vent at 14/50%/400/8, creamy thick secretions from trach. Trach dome changed. SR, HR in 80's, frequently bradys down to 50's and resolves on its own. MAP 75 - 85. TF at 45mL/HR and 75 free water Q4Hr. G-tube to gravity with 20 mL OP. TF to J-tube. Insulin gtt ranges from 4 - 5 U/Hr. UOP of 150mL/Hr, large loose incontinent stool in rectal pouch. 2mg bumex administered at HS. All dressings changed.  Plan: Neuro, ENT, transplant following. Possible need for repeat brain MR once stable. Continue to monitor.     For vital signs, hemodynamics and complete assessments, please see documentation flowsheets

## 2021-10-31 NOTE — PLAN OF CARE
Major Shift Events:  Pt followed command to wiggle toes x1 today otherwise neuro unchanged.  Pt SR, BP WNL.  PS 10/8 x2 today for a couple hours each time, pt tolerated well.  Course lung sounds, moderate creamy secretions.  Tube feeds at goal, G tube to gravity putting out a lot of air minimal fluid.  Rectal pouch in place, pt having moderate amount of watery stool, metzger with good urine output.  Continuing on insulin drip for blood sugars.  Up to the chair today via lift, pt tolerated well.    Plan: Continue to monitor neuro status.   For vital signs and complete assessments, please see documentation flowsheets.    Problem: Adult Inpatient Plan of Care  Goal: Optimal Comfort and Wellbeing  Outcome: No Change

## 2021-10-31 NOTE — PROGRESS NOTES
Interval Progress Note:    Paged regarding change in patient's pupil exam.  On evaluation, patient's pupils were briskly reactive and equally reactive.  However, compared to last night, the right pupil was noticeably bigger compared to the left.  It was not blown as it reacted very appropriately to light.  The patient attempted to close both his eyes tightly while trying to shine a light in them, as was the case last night.  He withdrew slightly to noxious stimuli in the BLEs.  Given the patient's pupils are still appropriately reactive, will continue to monitor.        Oscar Smith MD on 10/30/2021 at 8:12 PM

## 2021-10-31 NOTE — PROGRESS NOTES
Lung Transplant Consult Follow Up Note   October 31, 2021            Assessment and Plan:   Edson Thornton is a 56 year old male with a PMH significant for NSIP/ILD, bronchiectasis, moderate PH, RA, SALTY, chronic HSV infection, hypogammaglobulinemia, steroid-induced diabetes, hypothyroidism, PFO, HTN, HLD, duodenal anomaly, anxiety, and depression.  Admitted on 9/5/21 from OSH for acute on chronic respiratory failure 2/2 ILD exacerbation, now s/p BSLT on 10/16/21. Surgery relatively uncomplicated but pt. with low cardiac index and PGD immediately post-op.  Caroline weaned off 10/22, remains intubated.  Post-op course complicated by encephalopathy and diffuse weakness, acute to subacute CVA, afib with RVR, BRENNAN, GI bleed due to NJ/OG trauma, Candidemia/candida empyema and recurring fevers.  Neuro status remains tenuous, though ongoing improvement noted since 10/29.       Today's recommendations:   - Tacrolimus level 9.5, continue current dose and monitor daily. If level remains stable, reduce frequency of levels.  - Bronch 10/30 with 3+ GPC on gram stain, identity pending   - Prednisone taper today (10/31), next taper 11/7 (not ordered)  - Reassess for diuresis daily.  - Primary to reduce antihypertensives as improving BPs   - CMV and EBV 11/16  - Coccidioides Ag 11/17  -  no indication to increase abx for LP per ID and neuro  - Meropenem for empiric coverage  (fever and leukocytosis improving)  - Repeat IgG 11/17  - DSA repeat ordered 11/1  - Donor risk labs 11/15     S/p BSLT for ILD:  Acute hypoxic respiratory failure:   Pulmonary edema:  Subcutaneous emphysema: Unfortunately had not received vaccination for flu, PNA, or COVID-19 PTA.  Explant pathology with NSIP, no malignancy.  PGD 2-3.  Weaned off paralytic 10/19 (for vent dyssynchrony), pressors 10/21 (now hypertensive), and Caroline 10/22.  Initial difficulty weaning sedation given agitation then with neurological findings as below.  CXR initially post-op with pulmonary  edema, aggressively diuresed.  Recurring fevers post-op, see ID section below.  Bronch 10/20 with tan secretions to LLL, repeat 10/23 with frothy clear secretions in right TB tree.  Chest CT (10/25) with interval improvement in consolidative opacities to BLL and KARI with trace right hydroPTX (personally reviewed).  Bronch (10/26) with minimal sticky secretions, anastomosis intact.  Remains intubated and with persistent encephalopathy as below.  - PS trials as tolerated.  - Wean O2 as tolerated (increased to 60% yesterday, decreased back to 50% today),  ABG 7.49/41/87/31 (50%)  - Bronch 10/30 for increased O2 requirements, thick secretion in the RUL, LLL, mild mucosal erythema   - Nebs: levalbuterol and Mucomyst QID  - Aggressive pulmonary toilet with chest physiotherapy QID  - Ventilator management and PST per ICU team - FIO2 and PEEP increased to 60% and 8, respectively on 10/30 - plan for bronch for secretion clearance  - Trach and PEG/J tube placed by thoracic on 10/29 without incident  - Agree with ongoing diuresis as tolerated  - CXR 10/31, reviewed by me, with persistent bibasilar opacities, unchanged from yesterday.      Immunosuppression: s/p induction therapy with basiliximab 10/16 (and high dose IV steroid) and 10/20  - Tacrolimus 2 mg BID suspension (increased 10/28, fluconazole 10/26, recently held 10/23-10/25 due to supratherapeutic levels).  Goal level 8-12.  Level today 9.5, no dose adjustment today, continue daily levels for now. Reduce frequency of levels if  consistently therapeutic.  - MMF 1500 mg BID (on PTA, AZA to be avoided given TPMT)  - Prednisone tapered to 15/12.5 today(10/31). Next taper due 11/7 (not ordered)  Date AM dose (mg) PM dose (mg)   10/24/21 15 15   10/31/21 15 12.5   11/7/21 12.5 12.5   11/21/21 12.5 10   12/5/21 10 10   1/2/22 10 7.5   1/30/22 7.5 7.5   2/27/22 7.5 5   3/27/22 5 5   4/24/22 5 2.5      Prophylaxis:   - Bactrim for PJP ppx (10/25, held prior d/t BRENNAN)  -  Nystatin for oral candidiasis ppx, 6 month course  - See below for serologies and viral ppx:    Donor Recipient Intervention   CMV status Negative Negative None, CMV monthly (ordered 11/16)   EBV status Positive Positive None, EBV monthly (ordered 11/16)   HSV status N/A Positive Acyclovir POD #1-30   (recent infection history pre-txp)      ID: Concern for possible Strongyloides exposure pre-transplant s/p ivermectin x1 dose (9/17).  Donor and recipient cultures NGTD.  S/p IV ceftazidime/vancomycin for 48h per protocol and additional empiric ceftazidime 10/19-10/23 given recurrent fevers.  Bronch cultures 10/17 with Staph epi.  Cryptococcal Ag negative 10/23.    - Monthly Coccidioides Ag x12 months post-transplant per ID (urine and serum negative 10/17), due 11/17 (ordered)     Recurring fevers:  Fevers post-op, Tmax 101.7 POD #1.  Febrile with worsening leukocytosis again 10/25, generally persisting though now improving since 10/28.   - Trach sputum culture (10/25) with GPC, culture with 1+ nl jean marie  - Pleural cultures (10/25) with 3+ WBC, candida albicans  - Bronch cultures (10/26) with yeast, following  - 10/30 bronchoscopy with 3+ gram-positive cocci in clusters, cultures pending. No h/o MRSA. Meropenem (see below) likely adequate coverage. Narrow coverage when culture/sensitivity is available.  - LP 10/29 by medicine procedural team, xanthochromic with pleocytosis but thought to be appropriate given RBC and WBCs, no abx recommended for LP results per ID and neuro.  - Meropenem initiated 10/28 for persistent fever and leukocytosis, now afebrile and WBC decreasing.     Disseminated Candida:  Noted on 10/20 and 10/22.  BDG fungitell positive (399) on 10/20.  Respiratory cultures with persistent Candida albicans.  TC 10/23 without evidence of endocarditis.   Ophthalmology consult 10/24 with benign dilated fundoscopic exam.  Candida empyema also noted 10/25, chest tubes inadvertently removed by CVTS on 10/28 (plan  was to continue at least one for a few days longer given positive cultures). Consider repeat chest CT (or U/S)  in the next 24-48h and if sufficient fluid, replace chest tube(s).  - Repeat blood cultures (10/23, 10/25) NGTD  - Fluconazole (10/26, prior micafungin 10/22-10/27), likely through 11/8 but final duration TBD pending clinical course     HSV: Chronic intermittent active infection pre-transplant with recent HSV infection: crusted lesions throughout left side of jaw, s/p 10 day treatment course of ACV through 10/9.  HSV PCR blood negative 10/17.  - ACV ppx as above (started POD #1 instead of POD #8 given HSV history and location)     Hypogammaglobulinemia: IgG previously low at 364 (9/7).  Noted at 265 at time of transplant, s/p IVIG with premedication 10/21.    - Repeat IgG 11/17 (ordered)     Positive cross match: Note that he received two doses of rituximab in June, which is likely contributing to cross match result.  DSA negative 10/17, repeat DSA 10/23 invalid with recent IVIG (as above).  - Repeat DSA 11/1 (ordered), monitoring q2 weeks     PHS risk criteria donor:  Additional labs required post-transplant (between 4-8 weeks post-op): Hepatitis B, Hepatitis C, and HIV by VISHAL (ordered 11/15).     SALTY: Noted pre-transplant.  Home CPAP 6-12 cm H2O.  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  MRI (10/23) with multifocal subacute infarcts within both cerebral hemispheres and left cerebellum.  EEG without seizures 10/22, ammonia normal.  DDx include surgery v embolic v infectious (see above regarding ID work up).  Heparin drip started 10/23.   Repeat stroke code 10/25 with marked decrease in  responsiveness with sedation wean.  Pupils not equal (3 mm R, 2 mm L with extropia) and gag reflex initially absent.  CTA head without obvious new pathology, MRI brain (with and without contrast) primarily revealing for infarct, low likelihood of PRES.  VEEG per neuro with severe diffuse encephalopathy.  Etiology of CVA likely 2/2 afib, PFO, or perioperative.  - Very gradual clinical improvement  - Heparin management per neurology and primary  - ID workup and management as above     Afib with RVR: Noted 10/18, started on amiodarone drip and transitioned to PO 10/21.  Currently in SR.  Heparin drip started 10/23 as above.  Transitioned to 200 mg daily amiodarone dosing on 10/29 (anticipate 4 week course).     BRENNAN  Rising BUN: Baseline creatinine 0.7.  BRENNAN noted POD #1, UO also downtrending.  Improving with aggressive diuresis but now worsened with worsened BUN. BUN rise may be from recent PEG/J, over-diuresis, less likely TFs. Creat elevated 10/31. Hold off on diuresis and reassess daily.     Elevated LFTs, Resolved: Shock liver post-op.  Initial ALT//230 evening of surgery, then with marked increase to 1550/1246 POD #1.  Now resolved (10/28).     GI bleed, Resolved: Attributed to mechanical trauma.  Hemoglobin dropped to 6.6 10/22, s/p 2 units pRBC.  OG tube with bloody output, EGD 10/23 noted NJ/OG tube trauma with scant oozing.  IV PPI BID.  Hemoglobin continues to drift down but no apparent source of active bleeding.  Check iron studies.     We appreciate the excellent care provided by the CVICU and CVTS teams.  Recommendations communicated via in person rounding and this note.  Will continue to follow along closely, please do not hesitate to call with any questions or concerns.     Abiodun Woods MD  [phone]           Interval History:     The patients is intubated and encephalopathic, unable to contribute to interval history or ROS.   Intermittently wiggling toes on command per nursing (not during my  visit). Mildly enlarged right pupil but no accociated neuro changes noted overnight.              Medications:       acetaminophen  975 mg Oral or Feeding Tube Q8H MARKY     acetylcysteine  2 mL Nebulization 4x Daily     acyclovir  400 mg Oral or NG Tube BID     amiodarone  200 mg Oral or Feeding Tube Daily     amLODIPine  10 mg Oral or Feeding Tube Daily     bumetanide  2 mg Intravenous BID     calcium carbonate 600 mg-vitamin D 400 units  1 tablet Oral or Feeding Tube BID w/meals     carvedilol  12.5 mg Oral or Feeding Tube BID     fiber modular (NUTRISOURCE FIBER)  2 packet Per Feeding Tube Daily     fluconazole  400 mg Oral or Feeding Tube Daily     [Held by provider] hydrALAZINE  100 mg Oral or Feeding Tube Q6H     insulin glargine  20 Units Subcutaneous QAM AC     [Held by provider] isosorbide dinitrate  40 mg Oral or Feeding Tube TID     levalbuterol  1.25 mg Nebulization 4x Daily     levothyroxine  25 mcg Oral Daily     lidocaine  2 patch Transdermal Q24H     lidocaine   Transdermal Q8H     meropenem  1 g Intravenous Q8H     multivitamins w/minerals  15 mL Per Feeding Tube Daily     mycophenolate  1,500 mg Oral or J tube BID     nystatin  1,000,000 Units Swish & Swallow 4x Daily     ofloxacin  5 drop Right Ear BID     pantoprazole  40 mg Per Feeding Tube BID AC     potassium chloride  20 mEq Oral or Feeding Tube Once     potassium chloride  20 mEq Oral Daily     predniSONE  15 mg Oral QAM    And     predniSONE  12.5 mg Oral QPM     protein modular  1 packet Per Feeding Tube TID     rosuvastatin  10 mg Oral or Feeding Tube Daily     senna-docusate  1 tablet Oral or Feeding Tube BID     sodium chloride (PF)  3 mL Intracatheter Q8H     sulfamethoxazole-trimethoprim  10 mL Oral or J tube Daily    Or     sulfamethoxazole-trimethoprim  1 tablet Oral or Feeding Tube Daily     tacrolimus  2 mg Per G Tube BID IS     bisacodyl, dextrose, dextrose, glucose **OR** dextrose **OR** glucagon, diphenhydrAMINE **OR**  diphenhydrAMINE, labetalol, lidocaine 4%, lidocaine, lidocaine (buffered or not buffered), magnesium hydroxide, naloxone **OR** naloxone **OR** naloxone **OR** naloxone, ondansetron **OR** ondansetron, oxymetazoline, prochlorperazine **OR** prochlorperazine, BETA BLOCKER NOT PRESCRIBED, sodium chloride (PF)         Physical Exam:   Temp:  [96.3  F (35.7  C)-99.3  F (37.4  C)] 98.6  F (37  C)  Pulse:  [] 54  Resp:  [18-30] 20  BP: ()/(46-80) 138/80  FiO2 (%):  [50 %-60 %] 50 %  SpO2:  [91 %-99 %] 98 %    Intake/Output Summary (Last 24 hours) at 10/31/2021 0735  Last data filed at 10/31/2021 0700  Gross per 24 hour   Intake 2307.43 ml   Output 2345 ml   Net -37.57 ml     Constitutional:   Eyes open, not interacting, in no apparent distress     Eyes:   Nonicteric, PERRL     Neck:   Trach/vent     Lungs:   Good air flow.  No crackles. No rhonchi.  No wheezes.     Cardiovascular:   Normal S1 and S2.  RRR.  No murmur. No gallop. No rub.     Abdomen:   NABS, soft, nondistended, nontender.  No HSM.     Musculoskeletal:   No edema     Neurologic:   Eyes open but not interacting or following commands during my visit.     Skin:   Warm, dry.  No rash on limited exam.             Data:   All laboratory and imaging data reviewed.    Results for orders placed or performed during the hospital encounter of 09/05/21 (from the past 24 hour(s))   Glucose by meter   Result Value Ref Range    GLUCOSE BY METER POCT 133 (H) 70 - 99 mg/dL   XR Chest Port 1 View    Narrative    Exam: XR CHEST PORT 1 VIEW, 10/30/2021 8:31 AM    Indication: s/p lung transplant    Comparison: Chest radiograph 10/29/2021.    Findings:   Portable AP 40 degree upright chest radiograph. The patient is  rotated. Postsurgical changes from lung transplant demonstrated with  transverse skin staples over the inferior chest and clamshell  sternotomy wires. Tracheostomy tube is now in place with tip over the  mid trachea. The feeding tube and nasogastric tube  have been removed.    Trachea projects to the left secondary to the degree of rotation. Lung  volumes are slightly low with mild pulmonary congestion and increased  streaky basilar opacities. Bilateral pleural effusions likely similar  allowing for difference in projection. No definite pneumothorax.  Osseous structures stable.    Upper abdomen grossly within normal limits.      Impression    Impression:   1.  Tracheostomy tube now demonstrated. Gastric and feeding tubes have  been removed.  2.  Postsurgical changes from lung transplant.  3.  Mild pulmonary congestion. Increased basal atelectasis. Small  pleural effusions.    KAMLA SHIN MD         SYSTEM ID:  [id number]   CBC with platelets   Result Value Ref Range    WBC Count 37.5 (H) 4.0 - 11.0 10e3/uL    RBC Count 2.72 (L) 4.40 - 5.90 10e6/uL    Hemoglobin 8.5 (L) 13.3 - 17.7 g/dL    Hematocrit 27.0 (L) 40.0 - 53.0 %    MCV 99 78 - 100 fL    MCH 31.3 26.5 - 33.0 pg    MCHC 31.5 31.5 - 36.5 g/dL    RDW 16.6 (H) 10.0 - 15.0 %    Platelet Count 274 150 - 450 10e3/uL   Potassium   Result Value Ref Range    Potassium 4.0 3.4 - 5.3 mmol/L   Glucose by meter   Result Value Ref Range    GLUCOSE BY METER POCT 166 (H) 70 - 99 mg/dL   Glucose by meter   Result Value Ref Range    GLUCOSE BY METER POCT 174 (H) 70 - 99 mg/dL   Glucose by meter   Result Value Ref Range    GLUCOSE BY METER POCT 161 (H) 70 - 99 mg/dL   Glucose by meter   Result Value Ref Range    GLUCOSE BY METER POCT 159 (H) 70 - 99 mg/dL   BRONCHOSCOPY   Result Value Ref Range    Bronchoscopy       [facility]  Endoscopy Department-[facility]  _______________________________________________________________________________  Patient Name: Edson Thornton           Procedure Date: 10/30/2021 2:45 PM  MRN: [id number]                       Account #: [id number]  YOB: 1965              Admit Type: Inpatient  Age: 56                               Gender:  Male  Note Status: Finalized                Attending MD: Jannette Pereira MD  Pause for the cause: Yes              Total Sedation Time: Yes  _______________________________________________________________________________     Procedure:            Bronchoscopy  Indications:          Post lung transplant airway inspection, Hypoxemia  Providers:            Jannette Pereira MD, Miguel Felipe, Technician  Medicines:            Lidocaine 1% applied to the tracheobronchial tree 3 mL  Requesting Physician:   Complications:        No immediate complications. Estimated blood l oss: None  _______________________________________________________________________________  Procedure:            Pre-Anesthesia Assessment:                        - The risks and benefits of the procedure and the                         sedation options and risks were discussed with the                         patient. All questions were answered and informed                         consent was obtained.                        - The heart rate, respiratory rate, oxygen saturations,                         blood pressure, adequacy of pulmonary ventilation, and                         response to care were monitored throughout the                         procedure.                        After obtaining informed consent, the Bronchoscope was                         introduced through the tracheostomy tube and advanced                         to the tracheobronchial tree. The procedure was                         accomplished without difficulty. The patient tolerated                          the procedure well.  Findings:       Bilateral anastomoses were intact without dehiscence. Suctioning of        thick mucous was performed in the right upper lobe and left lower lobe        and the airway was cleared. The suctioned material was sent for cell        count, bacterial culture, viral smears & culture, fungal & AFB analysis        and  cytology for immunocompromised host protocol.  Impression:           - Post lung transplant airway inspection                        - Hypoxemia                        - Suctioning was performed.                        - Intact bilateral anastomoses without signs of                         dehiscence.                        - Mildy erythematous airways particularly in RUL, LLL.  Recommendation:       - Await washing results.    Electronically Signed by:  Dr. Jannette Pereira  __________________________  Jannette Pereira MD  10/30/2021 10:17:34 PM  I was physically present for the entire viewing portion of the exam.  __________________ ________  Signature of teaching physician  Chirag/Issac Pereira MD  Number of Addenda: 0    Note Initiated On: 10/30/2021 2:45 PM     Glucose by meter   Result Value Ref Range    GLUCOSE BY METER POCT 170 (H) 70 - 99 mg/dL   Gram stain - Washing Site 1    Specimen: Bronchus; Washings   Result Value Ref Range    Gram Stain Result >25 PMNs/low power field (A)     Gram Stain Result 3+ Gram positive cocci in clusters (A)    Koh prep - Washing Site 1    Specimen: Bronchus; Washings   Result Value Ref Range    KOH Preparation No fungal elements seen     KOH Preparation Reference Range: No fungal elements seen.    Glucose by meter   Result Value Ref Range    GLUCOSE BY METER POCT 151 (H) 70 - 99 mg/dL   Heparin Unfractionated Anti Xa Level   Result Value Ref Range    Anti Xa Unfractionated Heparin 0.26 For Reference Range, See Comment IU/mL    Narrative    Therapeutic Range: UFH: 0.25-0.50 IU/mL for low intensity dosing,  0.30-0.70 IU/mL for high intensity dosing DVT and PE.  This test is not validated for other direct factor X inhibitors (e.g. rivaroxaban, apixaban, edoxaban, betrixaban, fondaparinux) and should not be used for monitoring of other medications.   Glucose by meter   Result Value Ref Range    GLUCOSE BY METER POCT 146 (H) 70 - 99 mg/dL   Glucose by meter   Result Value  Ref Range    GLUCOSE BY METER POCT 144 (H) 70 - 99 mg/dL   Glucose by meter   Result Value Ref Range    GLUCOSE BY METER POCT 145 (H) 70 - 99 mg/dL   Glucose by meter   Result Value Ref Range    GLUCOSE BY METER POCT 141 (H) 70 - 99 mg/dL   Glucose by meter   Result Value Ref Range    GLUCOSE BY METER POCT 147 (H) 70 - 99 mg/dL   Glucose by meter   Result Value Ref Range    GLUCOSE BY METER POCT 134 (H) 70 - 99 mg/dL   Extra Tube    Narrative    The following orders were created for panel order Extra Tube.  Procedure                               Abnormality         Status                     ---------                               -----------         ------                     Extra Green Top (Lithium...[[id number]]                      Final result               Extra Purple Top Tube[[id number]]                            Final result                 Please view results for these tests on the individual orders.   Extra Green Top (Lithium Heparin) Tube   Result Value Ref Range    Hold Specimen JIC    Extra Purple Top Tube   Result Value Ref Range    Hold Specimen JIC    Heparin Unfractionated Anti Xa Level   Result Value Ref Range    Anti Xa Unfractionated Heparin 0.34 For Reference Range, See Comment IU/mL    Narrative    Therapeutic Range: UFH: 0.25-0.50 IU/mL for low intensity dosing,  0.30-0.70 IU/mL for high intensity dosing DVT and PE.  This test is not validated for other direct factor X inhibitors (e.g. rivaroxaban, apixaban, edoxaban, betrixaban, fondaparinux) and should not be used for monitoring of other medications.   Glucose by meter   Result Value Ref Range    GLUCOSE BY METER POCT 146 (H) 70 - 99 mg/dL   Glucose by meter   Result Value Ref Range    GLUCOSE BY METER POCT 145 (H) 70 - 99 mg/dL   Glucose by meter   Result Value Ref Range    GLUCOSE BY METER POCT 124 (H) 70 - 99 mg/dL   Blood gas arterial   Result Value Ref Range    pH Arterial 7.49 (H) 7.35 - 7.45    pCO2 Arterial 41 35 - 45 mm Hg    pO2  Arterial 87 80 - 105 mm Hg    FIO2 50     Bicarbonate Arterial 31 (H) 21 - 28 mmol/L    Base Excess/Deficit (+/-) 7.3 (H) -9.0 - 1.8 mmol/L   Glucose by meter   Result Value Ref Range    GLUCOSE BY METER POCT 120 (H) 70 - 99 mg/dL   CBC with platelets differential    Narrative    The following orders were created for panel order CBC with platelets differential.  Procedure                               Abnormality         Status                     ---------                               -----------         ------                     CBC with platelets and d...[[id number]]  Abnormal            Final result                 Please view results for these tests on the individual orders.   Magnesium   Result Value Ref Range    Magnesium 2.2 1.6 - 2.3 mg/dL   Phosphorus   Result Value Ref Range    Phosphorus 4.0 2.5 - 4.5 mg/dL   Comprehensive metabolic panel   Result Value Ref Range    Sodium 146 (H) 133 - 144 mmol/L    Potassium 3.7 3.4 - 5.3 mmol/L    Chloride 109 94 - 109 mmol/L    Carbon Dioxide (CO2) 30 20 - 32 mmol/L    Anion Gap 7 3 - 14 mmol/L    Urea Nitrogen 108 (H) 7 - 30 mg/dL    Creatinine 1.34 (H) 0.66 - 1.25 mg/dL    Calcium 9.2 8.5 - 10.1 mg/dL    Glucose 114 (H) 70 - 99 mg/dL    Alkaline Phosphatase 127 40 - 150 U/L    AST 20 0 - 45 U/L    ALT 33 0 - 70 U/L    Protein Total 5.7 (L) 6.8 - 8.8 g/dL    Albumin 1.6 (L) 3.4 - 5.0 g/dL    Bilirubin Total 0.2 0.2 - 1.3 mg/dL    GFR Estimate 59 (L) >60 mL/min/1.73m2   INR   Result Value Ref Range    INR 1.17 (H) 0.85 - 1.15   Heparin Unfractionated Anti Xa Level   Result Value Ref Range    Anti Xa Unfractionated Heparin 0.35 For Reference Range, See Comment IU/mL    Narrative    Therapeutic Range: UFH: 0.25-0.50 IU/mL for low intensity dosing,  0.30-0.70 IU/mL for high intensity dosing DVT and PE.  This test is not validated for other direct factor X inhibitors (e.g. rivaroxaban, apixaban, edoxaban, betrixaban, fondaparinux) and should not be used for monitoring  of other medications.   CBC with platelets and differential   Result Value Ref Range    WBC Count 26.6 (H) 4.0 - 11.0 10e3/uL    RBC Count 2.66 (L) 4.40 - 5.90 10e6/uL    Hemoglobin 8.2 (L) 13.3 - 17.7 g/dL    Hematocrit 26.9 (L) 40.0 - 53.0 %     (H) 78 - 100 fL    MCH 30.8 26.5 - 33.0 pg    MCHC 30.5 (L) 31.5 - 36.5 g/dL    RDW 16.9 (H) 10.0 - 15.0 %    Platelet Count 196 150 - 450 10e3/uL    % Neutrophils 89 %    % Lymphocytes 3 %    % Monocytes 5 %    % Eosinophils 0 %    % Basophils 0 %    % Immature Granulocytes 3 %    NRBCs per 100 WBC 0 <1 /100    Absolute Neutrophils 23.6 (H) 1.6 - 8.3 10e3/uL    Absolute Lymphocytes 0.9 0.8 - 5.3 10e3/uL    Absolute Monocytes 1.3 0.0 - 1.3 10e3/uL    Absolute Eosinophils 0.0 0.0 - 0.7 10e3/uL    Absolute Basophils 0.1 0.0 - 0.2 10e3/uL    Absolute Immature Granulocytes 0.8 (H) <=0.0 10e3/uL    Absolute NRBCs 0.1 10e3/uL   Glucose by meter   Result Value Ref Range    GLUCOSE BY METER POCT 117 (H) 70 - 99 mg/dL

## 2021-10-31 NOTE — PROGRESS NOTES
[facility]    Stroke Progress Note    Interval Events- Continued to have better attention and interaction over the last 24 hours.   - Neurological examination improved today.      HPI Summary  Edson Thornton is a 56 year old male with with Afib, HTN, RA, SALTY, and ILD s/p lung transplant 10/16/21.  Per primary team he has had significant difficulty with ventilation required paralytics and sedation, however, he has typically moved all his extremities with full strength.  It was noted on 10/21 that he was no longer moving his lower extremities.  Stroke code activated on morning of 10/22 for persistent neurologic change.  CT and Brain MRI showed subacute bilateral cortical infarcts and left cerebellar infarct.  Neurologic exam improved on 10/30 s/p trach/peg procedure.  Pt has slow improvement over the last 2-3 days in terms of interaction and attention.     Stroke Evaluation Summarized    MRI/Head CT MRI Brain (10/26/2021):  Impression:  1. Evolving acute age multifocal acute infarctions in both cerebral  hemispheres and left cerebellum. No new areas of infarction when  compared with prior MRI brain on 10/23/2021.  2. Minimally increased punctate regions of susceptibility effect  within areas of infarction, corresponding to petechial hemorrhage  within previously identified multifocal acute infarctions.  3. Head MRA demonstrates no definite aneurysm or stenosis of the major  intracranial arteries.  4. Neck MRA demonstrates patent major cervical arteries.    MRI Brain (10/23/2021):  Impression:  Multifocal subacute infarcts within both cerebral hemispheres and left cerebellum all of which appear present on prior head CT 10/22/2021.  Minimal petechial hemorrhage associated with the infarct in the left occipital lobe.     Head CT (10/22/2021) : multiple scattered subacute ischemic strokes, predating exam change from 10/22.  May contribute to encephalopathy, but would not  explain lack of extremity movement.     Head CT (10/25/2021): This exam is significantly limited by motion and streak artifact. L posterior parietal lobe acute/subacute infarct is identified on current exam w/ remaining previously seen infarct possibly obscured by artifact. No CT evidence for new cortical hypoattentuation to indicate a new acute infarct. No definite acute intracranial hemorrhage or midline shift     Intracranial Vasculature   Head CTA (10/25/2021): No intracranial arterial large vessel occlusion or significant stenosis/occlusion    Head CTA (10/22/2021): demonstrates no aneurysm or stenosis of major intracranial arteries.    CT Perfusion (10/22/2021): focally decreased cerebral blood volume and cerebral blood flow in the posterior most left parieto-occipital region with corresponding slight increase in TTP possibly representing an area of hypoperfusion or core infarct.      Cervical Vasculature   CTA Neck (10/22/2021): Mild stenosis in the R vertebral artery w/ severe high-grade stenosis at the L vertebral artery origin    CTA Neck (10/22/2021): No stenosis of major cervical arteries     Echocardiogram TC reported no valvular vegetations and no pulmonary vein thrombosis per primary team.     EKG/Telemetry   Sinus tachycardia   Otherwise normal ECG   When compared with ECG of 21-OCT-2021 06:50,   ST no longer depressed in Anterior leads   ST no longer elevated in Lateral leads      Other Testing   EEG showed no epileptiform discharges x2.  Positive for diffuse encephalopathy x2.      LDL  No lab value available in past 30 days   A1C  No lab value available in past 30 days   Troponin No lab value available in past 48 hrs       Impression     # Acute to subacute ischemic stroke of bilateral cerebral hemispheres and left cerebellum with suspected cardioembolic etiology  # Encephalopathy with Lack of Reactivity on Physical Exam    Pt's initial stroke code was called on 10/22 for change in physical exam.  Per nursing report, the pt had previously been moving all extremities to the point of requiring restraints. However, pt suddenly stopped moving altogether on 10/21 ~7PM. No Stroke Code was called until the next morning. CTH revealed multiple likely subacute ischemic infarcts in the bilateral cerebral hemispheres and L cerebellar hemisphere while CTA Head/Neck was unrevealing. Also performed a CT Perfusion (10/22) which noted similar findings. At the time, sustained clonus was noted on exam, raising concerns for cervical myelopathy. However, this improved on subsequent exams and CT C-spine was unremarkable. The remainder of the stroke work-up was completed w/ MRI Brain again showing similar finding to CT images. At this time, raised concern that pt's noted strokes did not correspond well to his current deficits and that these were unlikely to be the cause of his change in physical exam. Obtained an EEG which only revealed diffuse encephalopathy. Pt was noted to have multiple metabolic/toxic derangements and so these were seen as the likely culprits. Has since been found to have an BRENNAN and Candidemia.    A second stroke code was called on 10/25 again for decreased reactivity on exam. Repeat CTH and CTA Head/Neck during the code were unrevealing. Exam appears mildly improved today but still seems too dense to be solely metabolic abnormalities and ongoing hypoxic/hypercapneic respiratory failure. Infection is highly suspect as he has begun to spike recurrent fevers. Per nursing report, his fevers broke on the evening of 10/27 and the pt has been afebrile sine. These fevers persisted despite empiric antibiotics, antivirals, and he is now on antifungals. Primary team in the process of determining source of infection. Also of concern is increased stroke burden not seen on CT imaging or potential for a broader cortical damage caused by something such as anoxic brain injury or the potential like PRES. Repeat MRI with  addendum stated that original known infarcts are progressing as expected; the findings on repeat MRI are unlikely to be associated with PRES.      He is having slow and mild progression of clinical appearance and neurologic exam. Some cultures and other labs pending that will take some time to result, although his neurologic exam in reassuring over the last 2 days.     Recommendations:  - CNS infection unlikely per discussion with ID, and formal antimicrobials per their recommendations.   - Some CSF cultures pending and may take several days    - Vascular Neurology will sign off at this time; Please consult neurocrit if questions arise about encephalopathy, and please consult vascular if concerns arise about stroke    This patient was staffed with the attending stroke physician, Dr. Albarran.      Irwin Kirkpatrick MD  PGY-3 Neurology Resident  10/31/2021    ______________________________________________________    Clinically Significant Risk Factors Present on Admission                  Medications   Scheduled Meds    acetaminophen  975 mg Oral or Feeding Tube Q8H MARKY     acetylcysteine  2 mL Nebulization 4x Daily     acyclovir  400 mg Oral or NG Tube BID     amiodarone  200 mg Oral or Feeding Tube Daily     amLODIPine  10 mg Oral or Feeding Tube Daily     [Held by provider] bumetanide  2 mg Intravenous BID     calcium carbonate 600 mg-vitamin D 400 units  1 tablet Oral or Feeding Tube BID w/meals     carvedilol  12.5 mg Oral or Feeding Tube BID     fiber modular (NUTRISOURCE FIBER)  2 packet Per Feeding Tube Daily     fluconazole  400 mg Oral or Feeding Tube Daily     [Held by provider] hydrALAZINE  100 mg Oral or Feeding Tube Q6H     insulin glargine  30 Units Subcutaneous QAM AC     [Held by provider] isosorbide dinitrate  40 mg Oral or Feeding Tube TID     levalbuterol  1.25 mg Nebulization 4x Daily     levothyroxine  25 mcg Oral Daily     lidocaine  2 patch Transdermal Q24H     lidocaine   Transdermal Q8H      meropenem  1 g Intravenous Q8H     multivitamins w/minerals  15 mL Per Feeding Tube Daily     mycophenolate  1,500 mg Oral or J tube BID     nystatin  1,000,000 Units Swish & Swallow 4x Daily     ofloxacin  5 drop Right Ear BID     pantoprazole  40 mg Per Feeding Tube BID AC     potassium chloride  20 mEq Oral Daily     predniSONE  15 mg Oral QAM    And     predniSONE  12.5 mg Oral QPM     protein modular  1 packet Per Feeding Tube TID     rosuvastatin  10 mg Oral or Feeding Tube Daily     senna-docusate  1 tablet Oral or Feeding Tube BID     sodium chloride (PF)  3 mL Intracatheter Q8H     sulfamethoxazole-trimethoprim  10 mL Oral or J tube Daily    Or     sulfamethoxazole-trimethoprim  1 tablet Oral or Feeding Tube Daily     tacrolimus  2 mg Per G Tube BID IS       Infusion Meds    dextrose       dextrose Stopped (10/24/21 1008)     heparin 850 Units/hr (10/31/21 1000)     insulin regular 5 Units/hr (10/31/21 1100)     BETA BLOCKER NOT PRESCRIBED         PRN Meds  bisacodyl, dextrose, dextrose, glucose **OR** dextrose **OR** glucagon, diphenhydrAMINE **OR** diphenhydrAMINE, labetalol, lidocaine 4%, lidocaine, lidocaine (buffered or not buffered), magnesium hydroxide, naloxone **OR** naloxone **OR** naloxone **OR** naloxone, ondansetron **OR** ondansetron, oxymetazoline, prochlorperazine **OR** prochlorperazine, BETA BLOCKER NOT PRESCRIBED, sodium chloride (PF)       PHYSICAL EXAMINATION  Temp:  [96.6  F (35.9  C)-99.3  F (37.4  C)] 98.6  F (37  C)  Pulse:  [] 79  Resp:  [20-30] 26  BP: ()/(46-80) 117/63  FiO2 (%):  [50 %-60 %] 50 %  SpO2:  [91 %-99 %] 96 %      General: No sedation, aroused by loud voice and noxious   HEENT: Normocephalic, atraumatic, no epistaxis, no oral lesions, MMM  Resp: No increased work of breathing  Cardio: RRR, S1/S2 appropriate   Abdomen:  Soft, non-distended, non-tender  Extremities: Warm and well perfused, peripheral pulses present, moderate edema  Skin: Not jaundiced,  no rash, scattered ecchymoses  Neuro:  Mental status: Lethargic, aroused by loud voice and noxious. Does not answer questions or follow commands during encounter. Speech fluency, comprehension and repetition are all impaired.   Cranial nerves: Eyes conjugate, PERRL, EOMI, visual fields full to threat, face symmetric, hearing intact to conversation. Remainder of cranial nerves unable to be assessed due to mental status.   Motor: Tone normal with some degree of paratonia. Moving all extremities although weak and deconditioned.  No abnormal movements noted (no myoclonus).  Reflexes:  Toes down-going.  Sensory: Withdraws to noxious stimuli in all four extremities.   Coordination: Unable to adequately assess due to mental status.  Gait: Unable to assess due to mental status.      Stroke Scales    Imaging  I personally reviewed all imaging; relevant findings per HPI.     Lab Results Data   CBC  Recent Labs   Lab 10/31/21  0610 10/30/21  0944 10/30/21  0503   WBC 26.6* 37.5* 36.1*   RBC 2.66* 2.72* 2.81*   HGB 8.2* 8.5* 8.6*   HCT 26.9* 27.0* 28.0*    274 207     Basic Metabolic Panel    Recent Labs   Lab 10/31/21  1120 10/31/21  1006 10/31/21  0846 10/31/21  0650 10/31/21  0610 10/30/21  0956 10/30/21  0945 10/30/21  0514 10/30/21  0503 10/29/21  0627 10/29/21  0542   NA  --   --   --   --  146*  --   --   --  142  --  139   POTASSIUM  --   --   --   --  3.7  --  4.0  --  3.7   < > 4.6   CHLORIDE  --   --   --   --  109  --   --   --  109  --  107   CO2  --   --   --   --  30  --   --   --  26  --  26   BUN  --   --   --   --  108*  --   --   --  93*  --  72*   CR  --   --   --   --  1.34*  --   --   --  1.25  --  1.23   * 164* 149*   < > 114*   < >  --    < > 160*   < > 128*   ERIK  --   --   --   --  9.2  --   --   --  8.9  --  9.0    < > = values in this interval not displayed.     Liver Panel  Recent Labs   Lab 10/31/21  0610 10/30/21  0503 10/29/21  0542   PROTTOTAL 5.7* 5.7* 5.8*   ALBUMIN 1.6* 1.6*  1.0*   BILITOTAL 0.2 0.2 0.3   ALKPHOS 127 108 106   AST 20 17 20   ALT 33 29 35     INR    Recent Labs   Lab Test 10/31/21  0610 10/30/21  0503 10/29/21  0542   INR 1.17* 1.10 1.13      Lipid Profile    Recent Labs   Lab Test 10/22/21  0407 10/21/21  0354 10/20/21  0308 10/19/21  0338 09/07/21  1324   CHOL  --   --   --   --  214*   HDL  --   --   --   --  51   LDL  --   --   --   --  130*   TRIG 307* 486* 383*   < > 364*    < > = values in this interval not displayed.     A1C    Recent Labs   Lab Test 09/07/21  0928 09/05/21  1901   A1C 6.6* 6.5*     Troponin I    No results for input(s): TROPONIN, GHTROP in the last 168 hours.

## 2021-11-01 ENCOUNTER — APPOINTMENT (OUTPATIENT)
Dept: PHYSICAL THERAPY | Facility: CLINIC | Age: 56
End: 2021-11-01
Attending: STUDENT IN AN ORGANIZED HEALTH CARE EDUCATION/TRAINING PROGRAM
Payer: COMMERCIAL

## 2021-11-01 ENCOUNTER — APPOINTMENT (OUTPATIENT)
Dept: GENERAL RADIOLOGY | Facility: CLINIC | Age: 56
End: 2021-11-01
Attending: STUDENT IN AN ORGANIZED HEALTH CARE EDUCATION/TRAINING PROGRAM
Payer: COMMERCIAL

## 2021-11-01 LAB
ALBUMIN SERPL-MCNC: 1.8 G/DL (ref 3.4–5)
ALBUMIN UR-MCNC: 30 MG/DL
ALP SERPL-CCNC: 166 U/L (ref 40–150)
ALT SERPL W P-5'-P-CCNC: 57 U/L (ref 0–70)
ANION GAP SERPL CALCULATED.3IONS-SCNC: 5 MMOL/L (ref 3–14)
APPEARANCE UR: CLEAR
AST SERPL W P-5'-P-CCNC: 50 U/L (ref 0–45)
BACTERIA SPEC CULT: NORMAL
BASE EXCESS BLDA CALC-SCNC: 7.7 MMOL/L (ref -9–1.8)
BASOPHILS # BLD MANUAL: 0 10E3/UL (ref 0–0.2)
BASOPHILS NFR BLD MANUAL: 0 %
BILIRUB SERPL-MCNC: 0.2 MG/DL (ref 0.2–1.3)
BILIRUB UR QL STRIP: NEGATIVE
BUN SERPL-MCNC: 99 MG/DL (ref 7–30)
CALCIUM SERPL-MCNC: 9.6 MG/DL (ref 8.5–10.1)
CHLORIDE BLD-SCNC: 116 MMOL/L (ref 94–109)
CO2 SERPL-SCNC: 28 MMOL/L (ref 20–32)
COLOR UR AUTO: YELLOW
CREAT SERPL-MCNC: 1.08 MG/DL (ref 0.66–1.25)
CREAT UR-MCNC: 43 MG/DL
CREAT UR-MCNC: 43 MG/DL
EOSINOPHIL # BLD MANUAL: 0 10E3/UL (ref 0–0.7)
EOSINOPHIL NFR BLD MANUAL: 0 %
ERYTHROCYTE [DISTWIDTH] IN BLOOD BY AUTOMATED COUNT: 17.8 % (ref 10–15)
GFR SERPL CREATININE-BSD FRML MDRD: 76 ML/MIN/1.73M2
GLUCOSE BLD-MCNC: 146 MG/DL (ref 70–99)
GLUCOSE BLDC GLUCOMTR-MCNC: 106 MG/DL (ref 70–99)
GLUCOSE BLDC GLUCOMTR-MCNC: 106 MG/DL (ref 70–99)
GLUCOSE BLDC GLUCOMTR-MCNC: 109 MG/DL (ref 70–99)
GLUCOSE BLDC GLUCOMTR-MCNC: 112 MG/DL (ref 70–99)
GLUCOSE BLDC GLUCOMTR-MCNC: 122 MG/DL (ref 70–99)
GLUCOSE BLDC GLUCOMTR-MCNC: 139 MG/DL (ref 70–99)
GLUCOSE BLDC GLUCOMTR-MCNC: 140 MG/DL (ref 70–99)
GLUCOSE BLDC GLUCOMTR-MCNC: 145 MG/DL (ref 70–99)
GLUCOSE BLDC GLUCOMTR-MCNC: 158 MG/DL (ref 70–99)
GLUCOSE BLDC GLUCOMTR-MCNC: 166 MG/DL (ref 70–99)
GLUCOSE BLDC GLUCOMTR-MCNC: 173 MG/DL (ref 70–99)
GLUCOSE BLDC GLUCOMTR-MCNC: 178 MG/DL (ref 70–99)
GLUCOSE BLDC GLUCOMTR-MCNC: 186 MG/DL (ref 70–99)
GLUCOSE BLDC GLUCOMTR-MCNC: 90 MG/DL (ref 70–99)
GLUCOSE UR STRIP-MCNC: NEGATIVE MG/DL
HCO3 BLD-SCNC: 31 MMOL/L (ref 21–28)
HCT VFR BLD AUTO: 31.4 % (ref 40–53)
HGB BLD-MCNC: 9.6 G/DL (ref 13.3–17.7)
HGB UR QL STRIP: ABNORMAL
INR PPP: 1.13 (ref 0.85–1.15)
KETONES UR STRIP-MCNC: NEGATIVE MG/DL
LEUKOCYTE ESTERASE UR QL STRIP: NEGATIVE
LYMPHOCYTES # BLD MANUAL: 1.1 10E3/UL (ref 0.8–5.3)
LYMPHOCYTES NFR BLD MANUAL: 4 %
MAGNESIUM SERPL-MCNC: 2.2 MG/DL (ref 1.6–2.3)
MCH RBC QN AUTO: 31.2 PG (ref 26.5–33)
MCHC RBC AUTO-ENTMCNC: 30.6 G/DL (ref 31.5–36.5)
MCV RBC AUTO: 102 FL (ref 78–100)
MONOCYTES # BLD MANUAL: 1.1 10E3/UL (ref 0–1.3)
MONOCYTES NFR BLD MANUAL: 4 %
MUCOUS THREADS #/AREA URNS LPF: PRESENT /LPF
MYELOCYTES # BLD MANUAL: 0.9 10E3/UL
MYELOCYTES NFR BLD MANUAL: 3 %
NEUTROPHILS # BLD MANUAL: 25.3 10E3/UL (ref 1.6–8.3)
NEUTROPHILS NFR BLD MANUAL: 89 %
NITRATE UR QL: NEGATIVE
NRBC # BLD AUTO: 0.6 10E3/UL
NRBC BLD MANUAL-RTO: 2 %
O2/TOTAL GAS SETTING VFR VENT: 50 %
PATH REPORT.COMMENTS IMP SPEC: ABNORMAL
PATH REPORT.COMMENTS IMP SPEC: ABNORMAL
PATH REPORT.COMMENTS IMP SPEC: YES
PATH REPORT.FINAL DX SPEC: ABNORMAL
PATH REPORT.GROSS SPEC: ABNORMAL
PATH REPORT.RELEVANT HX SPEC: ABNORMAL
PCO2 BLD: 35 MM HG (ref 35–45)
PH BLD: 7.55 [PH] (ref 7.35–7.45)
PH UR STRIP: 5.5 [PH] (ref 5–7)
PHOSPHATE SERPL-MCNC: 2.7 MG/DL (ref 2.5–4.5)
PLAT MORPH BLD: ABNORMAL
PLATELET # BLD AUTO: 308 10E3/UL (ref 150–450)
PO2 BLD: 89 MM HG (ref 80–105)
POLYCHROMASIA BLD QL SMEAR: SLIGHT
POTASSIUM BLD-SCNC: 4.7 MMOL/L (ref 3.4–5.3)
PROT SERPL-MCNC: 6.2 G/DL (ref 6.8–8.8)
PROT UR-MCNC: 0.68 G/L
PROT/CREAT 24H UR: 1.58 G/G CR (ref 0–0.2)
RBC # BLD AUTO: 3.08 10E6/UL (ref 4.4–5.9)
RBC MORPH BLD: ABNORMAL
RBC URINE: 71 /HPF
SARS-COV-2 RNA RESP QL NAA+PROBE: NEGATIVE
SODIUM SERPL-SCNC: 149 MMOL/L (ref 133–144)
SODIUM UR-SCNC: 37 MMOL/L
SP GR UR STRIP: 1.02 (ref 1–1.03)
TACROLIMUS BLD-MCNC: 11.7 UG/L (ref 5–15)
TME LAST DOSE: NORMAL H
TME LAST DOSE: NORMAL H
UFH PPP CHRO-ACNC: 0.45 IU/ML
UROBILINOGEN UR STRIP-MCNC: NORMAL MG/DL
UUN UR-MCNC: 1340 MG/DL
UUN/CREAT 24H UR: 31 G/G CR
WBC # BLD AUTO: 28.4 10E3/UL (ref 4–11)
WBC URINE: 0 /HPF

## 2021-11-01 PROCEDURE — 250N000013 HC RX MED GY IP 250 OP 250 PS 637: Performed by: NURSE PRACTITIONER

## 2021-11-01 PROCEDURE — 250N000013 HC RX MED GY IP 250 OP 250 PS 637: Performed by: STUDENT IN AN ORGANIZED HEALTH CARE EDUCATION/TRAINING PROGRAM

## 2021-11-01 PROCEDURE — 94640 AIRWAY INHALATION TREATMENT: CPT | Mod: 76

## 2021-11-01 PROCEDURE — 36600 WITHDRAWAL OF ARTERIAL BLOOD: CPT

## 2021-11-01 PROCEDURE — 99223 1ST HOSP IP/OBS HIGH 75: CPT | Mod: 24 | Performed by: INTERNAL MEDICINE

## 2021-11-01 PROCEDURE — 250N000011 HC RX IP 250 OP 636: Performed by: STUDENT IN AN ORGANIZED HEALTH CARE EDUCATION/TRAINING PROGRAM

## 2021-11-01 PROCEDURE — 250N000009 HC RX 250: Performed by: STUDENT IN AN ORGANIZED HEALTH CARE EDUCATION/TRAINING PROGRAM

## 2021-11-01 PROCEDURE — 71045 X-RAY EXAM CHEST 1 VIEW: CPT

## 2021-11-01 PROCEDURE — 999N000157 HC STATISTIC RCP TIME EA 10 MIN

## 2021-11-01 PROCEDURE — 99291 CRITICAL CARE FIRST HOUR: CPT | Mod: 24 | Performed by: ANESTHESIOLOGY

## 2021-11-01 PROCEDURE — 0B114F4 BYPASS TRACHEA TO CUTANEOUS WITH TRACHEOSTOMY DEVICE, PERCUTANEOUS ENDOSCOPIC APPROACH: ICD-10-PCS | Performed by: THORACIC SURGERY (CARDIOTHORACIC VASCULAR SURGERY)

## 2021-11-01 PROCEDURE — 94668 MNPJ CHEST WALL SBSQ: CPT

## 2021-11-01 PROCEDURE — 81001 URINALYSIS AUTO W/SCOPE: CPT | Performed by: STUDENT IN AN ORGANIZED HEALTH CARE EDUCATION/TRAINING PROGRAM

## 2021-11-01 PROCEDURE — 99232 SBSQ HOSP IP/OBS MODERATE 35: CPT | Performed by: PHYSICIAN ASSISTANT

## 2021-11-01 PROCEDURE — 85520 HEPARIN ASSAY: CPT | Performed by: SURGERY

## 2021-11-01 PROCEDURE — 80197 ASSAY OF TACROLIMUS: CPT | Performed by: STUDENT IN AN ORGANIZED HEALTH CARE EDUCATION/TRAINING PROGRAM

## 2021-11-01 PROCEDURE — 86832 HLA CLASS I HIGH DEFIN QUAL: CPT | Performed by: STUDENT IN AN ORGANIZED HEALTH CARE EDUCATION/TRAINING PROGRAM

## 2021-11-01 PROCEDURE — 250N000011 HC RX IP 250 OP 636

## 2021-11-01 PROCEDURE — U0003 INFECTIOUS AGENT DETECTION BY NUCLEIC ACID (DNA OR RNA); SEVERE ACUTE RESPIRATORY SYNDROME CORONAVIRUS 2 (SARS-COV-2) (CORONAVIRUS DISEASE [COVID-19]), AMPLIFIED PROBE TECHNIQUE, MAKING USE OF HIGH THROUGHPUT TECHNOLOGIES AS DESCRIBED BY CMS-2020-01-R: HCPCS | Performed by: STUDENT IN AN ORGANIZED HEALTH CARE EDUCATION/TRAINING PROGRAM

## 2021-11-01 PROCEDURE — 99233 SBSQ HOSP IP/OBS HIGH 50: CPT | Mod: 24 | Performed by: INTERNAL MEDICINE

## 2021-11-01 PROCEDURE — 258N000003 HC RX IP 258 OP 636: Performed by: STUDENT IN AN ORGANIZED HEALTH CARE EDUCATION/TRAINING PROGRAM

## 2021-11-01 PROCEDURE — 97110 THERAPEUTIC EXERCISES: CPT | Mod: GP

## 2021-11-01 PROCEDURE — 84100 ASSAY OF PHOSPHORUS: CPT | Performed by: STUDENT IN AN ORGANIZED HEALTH CARE EDUCATION/TRAINING PROGRAM

## 2021-11-01 PROCEDURE — 84540 ASSAY OF URINE/UREA-N: CPT | Performed by: STUDENT IN AN ORGANIZED HEALTH CARE EDUCATION/TRAINING PROGRAM

## 2021-11-01 PROCEDURE — 0DHA3UZ INSERTION OF FEEDING DEVICE INTO JEJUNUM, PERCUTANEOUS APPROACH: ICD-10-PCS | Performed by: THORACIC SURGERY (CARDIOTHORACIC VASCULAR SURGERY)

## 2021-11-01 PROCEDURE — 85027 COMPLETE CBC AUTOMATED: CPT | Performed by: STUDENT IN AN ORGANIZED HEALTH CARE EDUCATION/TRAINING PROGRAM

## 2021-11-01 PROCEDURE — 84156 ASSAY OF PROTEIN URINE: CPT | Performed by: STUDENT IN AN ORGANIZED HEALTH CARE EDUCATION/TRAINING PROGRAM

## 2021-11-01 PROCEDURE — 94003 VENT MGMT INPAT SUBQ DAY: CPT

## 2021-11-01 PROCEDURE — 250N000012 HC RX MED GY IP 250 OP 636 PS 637: Performed by: STUDENT IN AN ORGANIZED HEALTH CARE EDUCATION/TRAINING PROGRAM

## 2021-11-01 PROCEDURE — 250N000013 HC RX MED GY IP 250 OP 250 PS 637

## 2021-11-01 PROCEDURE — 84300 ASSAY OF URINE SODIUM: CPT | Performed by: STUDENT IN AN ORGANIZED HEALTH CARE EDUCATION/TRAINING PROGRAM

## 2021-11-01 PROCEDURE — 94640 AIRWAY INHALATION TREATMENT: CPT

## 2021-11-01 PROCEDURE — 250N000012 HC RX MED GY IP 250 OP 636 PS 637: Performed by: NURSE PRACTITIONER

## 2021-11-01 PROCEDURE — 82040 ASSAY OF SERUM ALBUMIN: CPT | Performed by: STUDENT IN AN ORGANIZED HEALTH CARE EDUCATION/TRAINING PROGRAM

## 2021-11-01 PROCEDURE — 71045 X-RAY EXAM CHEST 1 VIEW: CPT | Mod: 26 | Performed by: RADIOLOGY

## 2021-11-01 PROCEDURE — 85610 PROTHROMBIN TIME: CPT | Performed by: STUDENT IN AN ORGANIZED HEALTH CARE EDUCATION/TRAINING PROGRAM

## 2021-11-01 PROCEDURE — 250N000013 HC RX MED GY IP 250 OP 250 PS 637: Performed by: ANESTHESIOLOGY

## 2021-11-01 PROCEDURE — 97530 THERAPEUTIC ACTIVITIES: CPT | Mod: GP

## 2021-11-01 PROCEDURE — 0BC18ZZ EXTIRPATION OF MATTER FROM TRACHEA, VIA NATURAL OR ARTIFICIAL OPENING ENDOSCOPIC: ICD-10-PCS | Performed by: THORACIC SURGERY (CARDIOTHORACIC VASCULAR SURGERY)

## 2021-11-01 PROCEDURE — 200N000002 HC R&B ICU UMMC

## 2021-11-01 PROCEDURE — 83735 ASSAY OF MAGNESIUM: CPT | Performed by: STUDENT IN AN ORGANIZED HEALTH CARE EDUCATION/TRAINING PROGRAM

## 2021-11-01 PROCEDURE — 82803 BLOOD GASES ANY COMBINATION: CPT | Performed by: STUDENT IN AN ORGANIZED HEALTH CARE EDUCATION/TRAINING PROGRAM

## 2021-11-01 PROCEDURE — 36415 COLL VENOUS BLD VENIPUNCTURE: CPT | Performed by: STUDENT IN AN ORGANIZED HEALTH CARE EDUCATION/TRAINING PROGRAM

## 2021-11-01 PROCEDURE — 86833 HLA CLASS II HIGH DEFIN QUAL: CPT | Performed by: STUDENT IN AN ORGANIZED HEALTH CARE EDUCATION/TRAINING PROGRAM

## 2021-11-01 RX ORDER — CARVEDILOL 6.25 MG/1
12.5 TABLET ORAL ONCE
Status: DISCONTINUED | OUTPATIENT
Start: 2021-11-01 | End: 2021-11-01

## 2021-11-01 RX ORDER — LOPERAMIDE HCL 2 MG
2 CAPSULE ORAL 4 TIMES DAILY PRN
Status: DISCONTINUED | OUTPATIENT
Start: 2021-11-01 | End: 2021-11-10

## 2021-11-01 RX ORDER — CARVEDILOL 6.25 MG/1
12.5 TABLET ORAL ONCE
Status: COMPLETED | OUTPATIENT
Start: 2021-11-01 | End: 2021-11-01

## 2021-11-01 RX ORDER — METOPROLOL TARTRATE 25 MG/1
25 TABLET, FILM COATED ORAL 2 TIMES DAILY
Status: DISCONTINUED | OUTPATIENT
Start: 2021-11-01 | End: 2021-11-02

## 2021-11-01 RX ORDER — CARVEDILOL 6.25 MG/1
25 TABLET ORAL 2 TIMES DAILY
Status: DISCONTINUED | OUTPATIENT
Start: 2021-11-01 | End: 2021-11-01

## 2021-11-01 RX ADMIN — MYCOPHENOLATE MOFETIL 1500 MG: 200 POWDER, FOR SUSPENSION ORAL at 07:57

## 2021-11-01 RX ADMIN — ROSUVASTATIN CALCIUM 10 MG: 10 TABLET, FILM COATED ORAL at 07:58

## 2021-11-01 RX ADMIN — Medication 40 MG: at 07:58

## 2021-11-01 RX ADMIN — MYCOPHENOLATE MOFETIL 1500 MG: 200 POWDER, FOR SUSPENSION ORAL at 19:44

## 2021-11-01 RX ADMIN — ACETYLCYSTEINE 2 ML: 200 SOLUTION ORAL; RESPIRATORY (INHALATION) at 16:14

## 2021-11-01 RX ADMIN — CALCIUM CARBONATE 600 MG (1,500 MG)-VITAMIN D3 400 UNIT TABLET 1 TABLET: at 17:36

## 2021-11-01 RX ADMIN — HUMAN INSULIN 4 UNITS/HR: 100 INJECTION, SOLUTION SUBCUTANEOUS at 13:31

## 2021-11-01 RX ADMIN — Medication 2 PACKET: at 07:57

## 2021-11-01 RX ADMIN — ACETAMINOPHEN 975 MG: 325 TABLET, FILM COATED ORAL at 01:53

## 2021-11-01 RX ADMIN — TACROLIMUS 2 MG: 5 CAPSULE ORAL at 07:57

## 2021-11-01 RX ADMIN — LEVALBUTEROL HYDROCHLORIDE 1.25 MG: 1.25 SOLUTION RESPIRATORY (INHALATION) at 21:22

## 2021-11-01 RX ADMIN — OXYMETAZOLINE HYDROCHLORIDE 2 SPRAY: 0.05 SPRAY NASAL at 07:58

## 2021-11-01 RX ADMIN — OFLOXACIN 5 DROP: 3 SOLUTION AURICULAR (OTIC) at 19:45

## 2021-11-01 RX ADMIN — AMIODARONE HYDROCHLORIDE 200 MG: 200 TABLET ORAL at 07:59

## 2021-11-01 RX ADMIN — HEPARIN SODIUM 850 UNITS/HR: 10000 INJECTION, SOLUTION INTRAVENOUS at 19:26

## 2021-11-01 RX ADMIN — LEVALBUTEROL HYDROCHLORIDE 1.25 MG: 1.25 SOLUTION RESPIRATORY (INHALATION) at 11:44

## 2021-11-01 RX ADMIN — NYSTATIN 1000000 UNITS: 500000 SUSPENSION ORAL at 11:36

## 2021-11-01 RX ADMIN — ACETYLCYSTEINE 2 ML: 200 SOLUTION ORAL; RESPIRATORY (INHALATION) at 07:43

## 2021-11-01 RX ADMIN — PREDNISONE 12.5 MG: 2.5 TABLET ORAL at 17:36

## 2021-11-01 RX ADMIN — NYSTATIN 1000000 UNITS: 500000 SUSPENSION ORAL at 19:44

## 2021-11-01 RX ADMIN — TACROLIMUS 2 MG: 5 CAPSULE ORAL at 17:37

## 2021-11-01 RX ADMIN — ACETAMINOPHEN 975 MG: 325 TABLET, FILM COATED ORAL at 09:43

## 2021-11-01 RX ADMIN — FLUCONAZOLE 400 MG: 200 TABLET ORAL at 07:58

## 2021-11-01 RX ADMIN — ACETAMINOPHEN 975 MG: 325 TABLET, FILM COATED ORAL at 17:36

## 2021-11-01 RX ADMIN — Medication 1 PACKET: at 08:54

## 2021-11-01 RX ADMIN — MEROPENEM 1 G: 1 INJECTION, POWDER, FOR SOLUTION INTRAVENOUS at 00:00

## 2021-11-01 RX ADMIN — Medication 1 PACKET: at 19:45

## 2021-11-01 RX ADMIN — ACYCLOVIR 400 MG: 200 SUSPENSION ORAL at 19:44

## 2021-11-01 RX ADMIN — AMLODIPINE BESYLATE 10 MG: 10 TABLET ORAL at 07:58

## 2021-11-01 RX ADMIN — OFLOXACIN 5 DROP: 3 SOLUTION AURICULAR (OTIC) at 08:00

## 2021-11-01 RX ADMIN — Medication 40 MG: at 16:03

## 2021-11-01 RX ADMIN — LIDOCAINE 2 PATCH: 560 PATCH PERCUTANEOUS; TOPICAL; TRANSDERMAL at 07:59

## 2021-11-01 RX ADMIN — NYSTATIN 1000000 UNITS: 500000 SUSPENSION ORAL at 16:03

## 2021-11-01 RX ADMIN — LEVOTHYROXINE SODIUM 25 MCG: 0.03 TABLET ORAL at 07:58

## 2021-11-01 RX ADMIN — CALCIUM CARBONATE 600 MG (1,500 MG)-VITAMIN D3 400 UNIT TABLET 1 TABLET: at 07:58

## 2021-11-01 RX ADMIN — SODIUM CHLORIDE, POTASSIUM CHLORIDE, SODIUM LACTATE AND CALCIUM CHLORIDE 500 ML: 600; 310; 30; 20 INJECTION, SOLUTION INTRAVENOUS at 16:11

## 2021-11-01 RX ADMIN — CARVEDILOL 12.5 MG: 6.25 TABLET, FILM COATED ORAL at 09:43

## 2021-11-01 RX ADMIN — LEVALBUTEROL HYDROCHLORIDE 1.25 MG: 1.25 SOLUTION RESPIRATORY (INHALATION) at 07:43

## 2021-11-01 RX ADMIN — MULTIVIT AND MINERALS-FERROUS GLUCONATE 9 MG IRON/15 ML ORAL LIQUID 15 ML: at 07:59

## 2021-11-01 RX ADMIN — CARVEDILOL 12.5 MG: 6.25 TABLET, FILM COATED ORAL at 07:59

## 2021-11-01 RX ADMIN — POTASSIUM CHLORIDE 20 MEQ: 40 SOLUTION ORAL at 08:00

## 2021-11-01 RX ADMIN — LEVALBUTEROL HYDROCHLORIDE 1.25 MG: 1.25 SOLUTION RESPIRATORY (INHALATION) at 16:14

## 2021-11-01 RX ADMIN — MEROPENEM 1 G: 1 INJECTION, POWDER, FOR SOLUTION INTRAVENOUS at 16:03

## 2021-11-01 RX ADMIN — SULFAMETHOXAZOLE AND TRIMETHOPRIM 80 MG: 200; 40 SUSPENSION ORAL at 07:57

## 2021-11-01 RX ADMIN — Medication 1 PACKET: at 07:57

## 2021-11-01 RX ADMIN — ACYCLOVIR 400 MG: 200 SUSPENSION ORAL at 07:57

## 2021-11-01 RX ADMIN — MEROPENEM 1 G: 1 INJECTION, POWDER, FOR SOLUTION INTRAVENOUS at 07:59

## 2021-11-01 RX ADMIN — HUMAN INSULIN 4 UNITS/HR: 100 INJECTION, SOLUTION SUBCUTANEOUS at 00:00

## 2021-11-01 RX ADMIN — PREDNISONE 15 MG: 5 TABLET ORAL at 07:58

## 2021-11-01 RX ADMIN — Medication 1 PACKET: at 13:31

## 2021-11-01 RX ADMIN — ACETYLCYSTEINE 2 ML: 200 SOLUTION ORAL; RESPIRATORY (INHALATION) at 11:44

## 2021-11-01 RX ADMIN — Medication 1 PACKET: at 19:44

## 2021-11-01 RX ADMIN — ACETYLCYSTEINE 2 ML: 200 SOLUTION ORAL; RESPIRATORY (INHALATION) at 21:23

## 2021-11-01 RX ADMIN — METOPROLOL TARTRATE 25 MG: 25 TABLET, FILM COATED ORAL at 19:44

## 2021-11-01 RX ADMIN — NYSTATIN 1000000 UNITS: 500000 SUSPENSION ORAL at 07:59

## 2021-11-01 ASSESSMENT — ACTIVITIES OF DAILY LIVING (ADL)
ADLS_ACUITY_SCORE: 22
ADLS_ACUITY_SCORE: 22
ADLS_ACUITY_SCORE: 24
ADLS_ACUITY_SCORE: 24
ADLS_ACUITY_SCORE: 22
ADLS_ACUITY_SCORE: 24
ADLS_ACUITY_SCORE: 24
ADLS_ACUITY_SCORE: 22
ADLS_ACUITY_SCORE: 24
ADLS_ACUITY_SCORE: 22
ADLS_ACUITY_SCORE: 24
ADLS_ACUITY_SCORE: 24
ADLS_ACUITY_SCORE: 22
ADLS_ACUITY_SCORE: 24
ADLS_ACUITY_SCORE: 24
ADLS_ACUITY_SCORE: 22

## 2021-11-01 ASSESSMENT — MIFFLIN-ST. JEOR: SCORE: 1436

## 2021-11-01 NOTE — PROGRESS NOTES
CLINICAL NUTRITION SERVICES - BRIEF NOTE   (see RD note on 10/2 for full assessment)    Ongoing loose stools, C diff negative (10/27). Will add fiber supplements to promote gut microbiome and thicken stools.     Nutrition changes today:  Added banatrol (1 pkt BID, 4 g fiber/day)     Sona Amezquita, ARMOND, LD  [id number]  Pgr: [id number]

## 2021-11-01 NOTE — PLAN OF CARE
Major Shift Events: pt remains lethargic, sleepy most of the day, pupils equal and reactive, withdraws to pain to lower extremities.  500 ml LR bolus given this evening, SR/ST 's, Trach Shiley 6, pressure supported this am for 4 hours, did not tolerated PS this evening, thick secretions, bleeding from trach, CVTS and thoracic aware. TF via J @ goal of 45 ml/hr FWF increased to 200 ml q4hr, G to gravity. Watery loose stools, rectal pouch in place, insulin gtt. Up in the chair most of the afternoon.  Plan: Continue to monitor and report any changes to MD.  For vital signs and complete assessments, please see documentation flowsheets.

## 2021-11-01 NOTE — CONSULTS
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 Corral.  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 We are consulted for BRENNAN , help with diuresis and hypernatremia:    Non oliguric BRENNAN  Likely Prerenal  Patient has a baseline creatinine of 0.7 but had a lung transplant on 10/16 and he developed brennan from hemodynamic changes during the surgical potassium. But his kidney recovers on 10/25 but at that time he got CTA head and neck with contrast and did cause a slight bump in creatinine but its unlikely he had contrast induced nephropathy as his creatinine is trending down. He is getting Lasix and he is more than 3 liter down and weight wise also 3 liter down. Probably he needs some fluid therefore will hold off to anymore diuretic and can consider giving 500 ml of RL as bolus as it will also help with keeping his MAP>70.He is on tacrolimus which is >11 and on the long term he will develop some level of CNI toxicity.Fe urea suggestive of pre renal BRENNAN.  -Keep MAP>70 and titrate antihypertensive to keep that goal especially in the setting he will be on tacrolimus  -UA shows some protein and RBC could be from catheter as he had 1 rbc 5 days back  -Also has 1.58 of pro:creatinine ratio could be in the setting of hemodynaic changes  -Accurate I/O's   -Daily weight  -Can consider giving 500 ml of Ringer Lactate  -Monitor BMP  -Avoid nephrotoxic medication  -IS management per lung transplant team     Hypertension:  Patient is on  amlodipine 10 mg and got a dose of Coreg 12.5 mg. Consider keeping MAP>70 given use of tacrolimus and pre renal BRENNAN  -monitor Blood pressure  - hold amlodipine    Hypernatremia:  Patient has a sodium of 149 and free  Water deficit of 2.7 liter  -give him 200 ml every 2 hour   -monitor sodium  Recommendations were communicated to primary team via phone    Seen and discussed with Dr. Rustam Kumar MD   [phone]    I have seen and examined this patient with the resident.  This note reflects our joint assessment and plan.     Meli Easton MD       REASON FOR CONSULT: BRENNAN ,volume management    HISTORY OF PRESENT ILLNESS:  Admitting provider and nursing notes reviewed  Edson Thornton is a 56 year old male with a PMH significant for NSIP/ILD, bronchiectasis, moderate PH, RA, SALTY, chronic HSV infection, hypogammaglobulinemia, steroid-induced diabetes, hypothyroidism, PFO, HTN, HLD, duodenal anomaly, anxiety, and depression.  Admitted on 9/5/21 from OSH for acute on chronic respiratory failure 2/2 ILD exacerbation, now s/p BSLT on 10/16/21.Patient phase of admission  Complicated by candidemia/candida empyema and recurring fevers.  Patient has a baseline creatinine of 0.7 and it started worsening from 10/16 and than started improving to baseline on 10/25 after which he got a contrast CT head and neck, after which patient creatinine worsen slightly. Patient was also getting diuretics and is currently 3 kg down and net negative 3 liter. Felder snot have any edema and is confused   PAST MEDICAL HISTORY:    Past Medical History:   Diagnosis Date    BRENNAN (acute kidney injury) (H) 10/17/2021    Anxiety     Depression     HLD (hyperlipidemia)     HTN (hypertension)     Hypothyroidism     ILD (interstitial lung disease) (H)     SALTY on CPAP     Oxygen dependent     BL 4L since ~6/2021    Rheumatoid arthritis (H)     signs ~5/2020, dx 5/2021    S/P lung transplant (H) 10/16/2021    Shock liver 10/17/2021    Steroid-induced  hyperglycemia     Traction bronchiectasis (H)        Past Surgical History:   Procedure Laterality Date    COLONOSCOPY W/ BIOPSIES AND POLYPECTOMY  07/21/2020    CV CORONARY ANGIOGRAM N/A 9/8/2021    Procedure: Coronary Angiogram with possible intervention;  Surgeon: Jovon Bullock MD;  Location:  [facility]    CV RIGHT HEART CATH MEASUREMENTS RECORDED N/A 9/8/2021    Procedure: Right Heart Cath;  Surgeon: Jovon Bullock MD;  Location:  [facility]    ESOPHAGOSCOPY, GASTROSCOPY, DUODENOSCOPY (EGD), COMBINED N/A 10/23/2021    Procedure: ESOPHAGOGASTRODUODENOSCOPY (EGD);  Surgeon: Miquel Pisano MD;  Location:  GI    EXTRACTION(S) DENTAL N/A 9/22/2021    Procedure: EXTRACTION tooth #19;  Surgeon: Deepak Tobin DDS;  [facility]: [facility]    HERNIA REPAIR      right acl      TRACHEOSTOMY PERCUTANEOUS N/A 10/29/2021    Procedure: Percutaneous Tracheostomy,;  Surgeon: Celine Jenkins MD;  [facility]: [facility]    TRANSPLANT LUNG RECIPIENT SINGLE X2 Bilateral 10/16/2021    Procedure: TRANSPLANT, LUNG, RECIPIENT, BILATERAL, Bronchoscopy, on-pump perfusion, bilateral clamshell sternotomy;  Surgeon: Yanick Corral MD;  Location: [facility]    XR ACROMIOCLAVICULAR JOINT BILATERAL          MEDICATIONS:  PTA Meds  Prior to Admission medications    Medication Sig Last Dose Taking? Auth Provider   acetaminophen (TYLENOL) 325 MG tablet Take 325 mg by mouth every 6 hours as needed for mild pain 8/29/2021 Yes Unknown, Entered By History   amLODIPine (NORVASC) 5 MG tablet Take 5 mg by mouth daily 9/5/2021 at 0813 Yes Unknown, Entered By History   escitalopram (LEXAPRO) 5 MG tablet Take 5 mg by mouth daily 9/4/2021 at 2006 Yes Unknown, Entered By History   fluticasone-vilanterol (BREO ELLIPTA) 200-25 MCG/INH inhaler Inhale 1 puff into the lungs daily 8/29/2021 Yes Unknown, Entered By History   insulin aspart (NOVOLOG FLEXPEN) 100 UNIT/ML pen Inject 3-11 Units Subcutaneous  With meals and at bedtime. 8/29/2021 Yes Unknown, Entered By History   levothyroxine (SYNTHROID/LEVOTHROID) 25 MCG tablet Take 25 mcg by mouth daily 9/5/2021 at 0812 Yes Unknown, Entered By History   omeprazole (PRILOSEC) 40 MG DR capsule Take 40 mg by mouth 2 times daily (before meals) 9/5/2021 at 0811 Yes Unknown, Entered By History   potassium chloride ER (KLOR-CON M) 20 MEQ CR tablet Take 20 mEq by mouth daily 8/29/2021 Yes Unknown, Entered By History   sulfamethoxazole-trimethoprim (BACTRIM DS) 800-160 MG tablet Take 1 tablet by mouth Every Mon, Wed, Fri Morning  8/30/2021 Yes Unknown, Entered By History      Current Meds   acetaminophen  975 mg Oral or Feeding Tube Q8H MARKY    acetylcysteine  2 mL Nebulization 4x Daily    acyclovir  400 mg Oral or NG Tube BID    amiodarone  200 mg Oral or Feeding Tube Daily    amLODIPine  10 mg Oral or Feeding Tube Daily    banatrol plus  1 packet Per Feeding Tube BID    calcium carbonate 600 mg-vitamin D 400 units  1 tablet Oral or Feeding Tube BID w/meals    fluconazole  400 mg Oral or Feeding Tube Daily    insulin glargine  40 Units Subcutaneous QAM AC    levalbuterol  1.25 mg Nebulization 4x Daily    levothyroxine  25 mcg Oral Daily    lidocaine  2 patch Transdermal Q24H    lidocaine   Transdermal Q8H    meropenem  1 g Intravenous Q8H    metoprolol tartrate  25 mg Oral BID    multivitamins w/minerals  15 mL Per Feeding Tube Daily    mycophenolate  1,500 mg Oral or J tube BID    nystatin  1,000,000 Units Swish & Swallow 4x Daily    ofloxacin  5 drop Right Ear BID    pantoprazole  40 mg Per Feeding Tube BID AC    potassium chloride  20 mEq Oral Daily    predniSONE  15 mg Oral QAM    And    predniSONE  12.5 mg Oral QPM    protein modular  1 packet Per Feeding Tube TID    rosuvastatin  10 mg Oral or Feeding Tube Daily    senna-docusate  1 tablet Oral or Feeding Tube BID    sodium chloride (PF)  3 mL Intracatheter Q8H    sulfamethoxazole-trimethoprim  10 mL Oral or J tube Daily     Or    sulfamethoxazole-trimethoprim  1 tablet Oral or Feeding Tube Daily    tacrolimus  2 mg Per G Tube BID IS     Infusion Meds   dextrose      dextrose Stopped (10/24/21 1008)    heparin 850 Units/hr (11/01/21 1700)    insulin regular 4 Units/hr (11/01/21 1700)    BETA BLOCKER NOT PRESCRIBED         ALLERGIES:    No Known Allergies    REVIEW OF SYSTEMS:  A comprehensive of systems was negative except as noted above.    SOCIAL HISTORY:   Social History     Socioeconomic History    Marital status: Single     Spouse name: Not on file    Number of children: Not on file    Years of education: Not on file    Highest education level: Not on file   Occupational History    Not on file   Tobacco Use    Smoking status: Never Smoker    Smokeless tobacco: Never Used   Substance and Sexual Activity    Alcohol use: Never    Drug use: Never    Sexual activity: Not on file   Other Topics Concern    Parent/sibling w/ CABG, MI or angioplasty before 65F 55M? Not Asked   Social History Narrative    Not on file     Social Determinants of Health     Financial Resource Strain:     Difficulty of Paying Living Expenses:    Food Insecurity:     Worried About Running Out of Food in the Last Year:     Ran Out of Food in the Last Year:    Transportation Needs:     Lack of Transportation (Medical):     Lack of Transportation (Non-Medical):    Physical Activity:     Days of Exercise per Week:     Minutes of Exercise per Session:    Stress:     Feeling of Stress :    Social Connections:     Frequency of Communication with Friends and Family:     Frequency of Social Gatherings with Friends and Family:     Attends [employer] Services:     Active Member of Clubs or Organizations:     Attends Club or Organization Meetings:     Marital Status:    Intimate Partner Violence:     Fear of Current or Ex-Partner:     Emotionally Abused:     Physically Abused:     Sexually Abused:        FAMILY MEDICAL HISTORY:   Family History   Problem Relation Age of  "Onset    Diabetes Type 1 Mother     Heart Disease Mother     Chronic Obstructive Pulmonary Disease Mother     Rheumatoid Arthritis Father     Emphysema Paternal Grandfather          PHYSICAL EXAM:   Temp  Av.1  F (37.3  C)  Min: 96.1  F (35.6  C)  Max: 101.8  F (38.8  C)  Arterial Line BP  Min: 79/32  Max: 294/292  Arterial Line MAP (mmHg)  Av.4 mmHg  Min: 10 mmHg  Max: 293 mmHg      Pulse  Av  Min: 52  Max: 185 Resp  Av.2  Min: 10  Max: 55  FiO2 (%)  Av.3 %  Min: 30 %  Max: 100 %  SpO2  Av.4 %  Min: 87 %  Max: 100 %    CVP (mmHg): 15 mmHg  /86   Pulse 103   Temp (!) 101.1  F (38.4  C)   Resp 27   Ht 1.626 m (5' 4\")   Wt 69.5 kg (153 lb 3.5 oz)   SpO2 96%   BMI 26.30 kg/m     Date 21 07 - 21 0659   Shift 4589-1844 6327-5318 1437-2714 24 Hour Total   INTAKE   I.V. 233 53.03  286.03   NG/ 230  640   Enteral 360 90  450   Shift Total(mL/kg) 1003(14.43) 373.03(5.37)  1376.03(19.8)   OUTPUT   Urine 550 100  650   Emesis/NG output 0 0  0   Stool 100   100   Shift Total(mL/kg) 650(9.35) 100(1.44)  750(10.79)   Weight (kg) 69.5 69.5 69.5 69.5      Admit Weight: 73.3 kg (161 lb 8 oz)     GENERAL APPEARANCE: No distress, confused sitting on the chair with tracheostomy  EYES: No scleral icterus, pupils equal  Endo: No goiter, No moon facies  Lymphatics: no cervical or supraclavicular LAD  Pulmonary: lungs clear to auscultation with equal breath sounds bilaterally, -ve clubbing  CV: regular rhythm, normal rate, no rub   - JVD -ve   - Edema -ve  GI: soft, nontender, normal bowel sounds  MS: no evidence of inflammation in joints, no muscle tenderness  SKIN: no rash, warm, dry, no cyanosis  NEURO: face symmetric     LABS:   CMP  Recent Labs   Lab 21  1631 21  1413 21  1154 21  1013 21  0819 21  0818 10/31/21  1356 10/31/21  1349 10/31/21  0650 10/31/21  0610 10/30/21  0956 10/30/21  0945 10/30/21  0514 10/30/21  0503 10/29/21  0627 " 10/29/21  0542   NA  --   --   --   --   --  149*  --  146*  --  146*  --   --   --  142   < > 139   POTASSIUM  --   --   --   --   --  4.7  --  4.3  --  3.7  --  4.0   < > 3.7   < > 4.6   CHLORIDE  --   --   --   --   --  116*  --  112*  --  109  --   --   --  109   < > 107   CO2  --   --   --   --   --  28  --  28  --  30  --   --   --  26   < > 26   ANIONGAP  --   --   --   --   --  5  --  6  --  7  --   --   --  7   < > 6   * 158* 145* 186*   < > 146*   < > 138*   < > 114*   < >  --    < > 160*   < > 128*   BUN  --   --   --   --   --  99*  --  102*  --  108*  --   --   --  93*   < > 72*   CR  --   --   --   --   --  1.08  --  1.13  --  1.34*  --   --   --  1.25   < > 1.23   GFRESTIMATED  --   --   --   --   --  76  --  72  --  59*  --   --   --  64   < > 65   ERIK  --   --   --   --   --  9.6  --  9.4  --  9.2  --   --   --  8.9   < > 9.0   MAG  --   --   --   --   --  2.2  --   --   --  2.2  --   --   --  2.3  --  1.8   PHOS  --   --   --   --   --  2.7  --   --   --  4.0  --   --   --  4.1  --  4.9*   PROTTOTAL  --   --   --   --   --  6.2*  --   --   --  5.7*  --   --   --  5.7*  --  5.8*   ALBUMIN  --   --   --   --   --  1.8*  --   --   --  1.6*  --   --   --  1.6*  --  1.0*   BILITOTAL  --   --   --   --   --  0.2  --   --   --  0.2  --   --   --  0.2  --  0.3   ALKPHOS  --   --   --   --   --  166*  --   --   --  127  --   --   --  108  --  106   AST  --   --   --   --   --  50*  --   --   --  20  --   --   --  17  --  20   ALT  --   --   --   --   --  57  --   --   --  33  --   --   --  29  --  35    < > = values in this interval not displayed.     CBC  Recent Labs   Lab 11/01/21  0818 10/31/21  0610 10/30/21  0944 10/30/21  0503   HGB 9.6* 8.2* 8.5* 8.6*   WBC 28.4* 26.6* 37.5* 36.1*   RBC 3.08* 2.66* 2.72* 2.81*   HCT 31.4* 26.9* 27.0* 28.0*   * 101* 99 100   MCH 31.2 30.8 31.3 30.6   MCHC 30.6* 30.5* 31.5 30.7*   RDW 17.8* 16.9* 16.6* 16.3*    196 274 207     INR  Recent Labs    Lab 11/01/21  0818 10/31/21  0610 10/30/21  0503 10/29/21  0542   INR 1.13 1.17* 1.10 1.13     ABG  Recent Labs   Lab 11/01/21  0412 10/31/21  0530 10/30/21  0531 10/29/21  0502   PH 7.55* 7.49* 7.52* 7.53*   PCO2 35 41 37 37   PO2 89 87 77* 97   HCO3 31* 31* 30* 31*   O2PER 50 50 50 45      URINE STUDIES  Recent Labs   Lab Test 11/01/21  1336 10/25/21  1507 10/22/21  1641 10/17/21  1642   COLOR Yellow Light Yellow Light Yellow Yellow   APPEARANCE Clear Clear Clear Slightly Cloudy*   URINEGLC Negative Negative Negative Negative   URINEBILI Negative Negative Negative Negative   URINEKETONE Negative Negative Negative Negative   SG 1.025 1.010 1.005 1.027   UBLD Moderate* Negative Negative Moderate*   URINEPH 5.5 5.5 7.5* 5.0   PROTEIN 30 * 20 * 50 * 20 *   NITRITE Negative Negative Negative Negative   LEUKEST Negative Negative Negative Negative   RBCU 71* 1 1 63*   WBCU 0 2 3 25*     Recent Labs   Lab Test 11/01/21  1336   [facility] 1.58*     PTH  No lab results found.  IRON STUDIES  Recent Labs   Lab Test 10/31/21  0610 09/09/21  0536   IRON 35 146    190*   IRONSAT 11* 77*   ARMEN  --  1,049*       IMAGING:  All imaging studies reviewed by me.     Tyrone Kumar MD

## 2021-11-01 NOTE — PROGRESS NOTES
Pulmonary Medicine  Cystic Fibrosis - Lung Transplant Team  Progress Note  2021    Patient: Edson Thornton  MRN: [id number]  : 1965 (age 56 year old)  Transplant: 10/16/2021 (Lung), POD#16)  Admission date: 2021    Assessment & Plan:   Edson Thornton is a 56 year old male with a PMH significant for NSIP/ILD, bronchiectasis, moderate PH, RA, SALTY, chronic HSV infection, hypogammaglobulinemia, steroid-induced diabetes, hypothyroidism, PFO, HTN, HLD, duodenal anomaly, anxiety, and depression.  Admitted on 21 from OSH for acute on chronic respiratory failure 2/2 ILD exacerbation, now s/p BSLT on 10/16/21. Surgery relatively uncomplicated but pt. with low cardiac index and PGD immediately post-op.  Caroline weaned off 10/22, remains intubated.  Post-op course complicated by encephalopathy and diffuse weakness, acute to subacute CVA, afib with RVR, BRENNAN, GI bleed due to NJ/OG trauma, Candidemia/candida empyema and recurring fevers.  Neuro status remains tenuous, although some improvement noted since 10/29.       Today's recommendations:   - Tacrolimus level 11.7, level drawn shortly AFTER meds were given.  Continue current dose and monitor daily. If level remains stable, reduce frequency of levels.  - Bronch 10/30 with 3+ GPC on gram stain, speciation pending   - Prednisone next taper  (not ordered)  - Repeat CT in the next day or so to evaluate for feasibility of chest tube placement given positive pleural fluid cultures (unclear which side) w/ Candida. A   - Reassess for diuresis daily.  - CMV and EBV   - Coccidioides Ag   - Meropenem for empiric coverage  (fever and leukocytosis improving)  - Repeat IgG   - DSA repeat ordered   - Donor risk labs 11/15     S/p BSLT for ILD:  Acute hypoxic respiratory failure:   Pulmonary edema:  Subcutaneous emphysema: Unfortunately had not received vaccination for flu, PNA, or COVID-19 PTA.  Explant pathology with NSIP, no  malignancy.  PGD 2-3.  Weaned off paralytic 10/19 (for vent dyssynchrony), pressors 10/21 (now hypertensive), and Caroline 10/22.  Initial difficulty weaning sedation given agitation then with neurological findings as below.  CXR initially post-op with pulmonary edema, aggressively diuresed.  Recurring fevers post-op, see ID section below.  Bronch 10/20 with tan secretions to LLL, repeat 10/23 with frothy clear secretions in right TB tree.  Chest CT (10/25) with interval improvement in consolidative opacities to BLL and KARI with trace right hydroPTX (personally reviewed).  Bronch (10/26) with minimal sticky secretions, anastomosis intact.  Remains intubated and with persistent encephalopathy as below.  - PS trials as tolerated.  - Bronch 10/30 for increased O2 requirements, thick secretion in the RUL, LLL, mild mucosal erythema. GPC in clusters from bronc cultures, on Meropenem.   - Nebs: levalbuterol and Mucomyst QID  - Aggressive pulmonary toilet with chest physiotherapy QID  - s/p Trach and PEG/J by thoracic on 10/29  - CXR 11/1 personally reviewed, increased haziness on L likely layered effusion.       Immunosuppression: s/p induction therapy with basiliximab 10/16 (and high dose IV steroid) and 10/20  - Tacrolimus 2 mg BID suspension (increased 10/28, fluconazole 10/26, recently held 10/23-10/25 due to supratherapeutic levels).  Goal level 8-12.  - MMF 1500 mg BID (on PTA, AZA to be avoided given TPMT)  - Prednisone tapered to 15/12.5 today(10/31). Next taper due 11/7 (not ordered)  Date AM dose (mg) PM dose (mg)   10/24/21 15 15   10/31/21 15 12.5   11/7/21 12.5 12.5   11/21/21 12.5 10   12/5/21 10 10   1/2/22 10 7.5   1/30/22 7.5 7.5   2/27/22 7.5 5   3/27/22 5 5   4/24/22 5 2.5      Prophylaxis:   - Bactrim for PJP ppx (10/25, held prior d/t BRENNAN)  - Nystatin for oral candidiasis ppx, 6 month course  - See below for serologies and viral ppx:    Donor Recipient Intervention   CMV status Negative Negative None, CMV  monthly (ordered 11/16)   EBV status Positive Positive None, EBV monthly (ordered 11/16)   HSV status N/A Positive Acyclovir POD #1-30   (recent infection history pre-txp)      ID: Concern for possible Strongyloides exposure pre-transplant s/p ivermectin x1 dose (9/17).  Donor and recipient cultures NGTD.  S/p IV ceftazidime/vancomycin for 48h per protocol and additional empiric ceftazidime 10/19-10/23 given recurrent fevers.  Bronch cultures 10/17 with Staph epi.  Cryptococcal Ag negative 10/23.    - Monthly Coccidioides Ag x12 months post-transplant per ID (urine and serum negative 10/17), due 11/17 (ordered)     Recurring fevers:  Fevers post-op, Tmax 101.7 POD #1.  Febrile with worsening leukocytosis again 10/25, generally persisting though now improving since 10/28.   - Trach sputum culture (10/25) with GPC, culture with 1+ nl jean marie  - Pleural cultures (10/25) with 3+ WBC, candida albicans  - Bronch cultures (10/26) with yeast, following  - 10/30 bronchoscopy with 3+ gram-positive cocci in clusters, cultures pending. No h/o MRSA. Meropenem (see below) likely adequate coverage. Narrow coverage when culture/sensitivity is available.  - LP 10/29 by medicine procedural team, xanthochromic with pleocytosis but thought to be appropriate given RBC and WBCs, no abx recommended for LP results per ID and neuro.  - Meropenem initiated 10/28 for persistent fever and leukocytosis, now afebrile and WBC decreasing.     Disseminated Candida:  Noted on 10/20 and 10/22.  BDG fungitell positive (399) on 10/20.  Respiratory cultures with persistent Candida albicans.  TC 10/23 without evidence of endocarditis.   Ophthalmology consult 10/24 with benign dilated fundoscopic exam.  Candida empyema also noted 10/25, chest tubes inadvertently removed by CVTS on 10/28 (plan was to continue at least one for a few days longer given positive cultures).  - Repeat blood cultures (10/23, 10/25) NGTD  - Fluconazole (10/26, prior  micafungin 10/22-10/27), likely through 11/8 but final duration TBD pending clinical course     HSV: Chronic intermittent active infection pre-transplant with recent HSV infection: crusted lesions throughout left side of jaw, s/p 10 day treatment course of ACV through 10/9.  HSV PCR blood negative 10/17.  - ACV ppx as above (started POD #1 instead of POD #8 given HSV history and location)     Hypogammaglobulinemia: IgG previously low at 364 (9/7).  Noted at 265 at time of transplant, s/p IVIG with premedication 10/21.    - Repeat IgG 11/17 (ordered)     Positive cross match: Note that he received two doses of rituximab in June, which is likely contributing to cross match result.  DSA negative 10/17, repeat DSA 10/23 invalid with recent IVIG (as above).  - Repeat DSA 11/1 (ordered), monitoring q2 weeks     PHS risk criteria donor:  Additional labs required post-transplant (between 4-8 weeks post-op): Hepatitis B, Hepatitis C, and HIV by VISHAL (ordered 11/15).     SALTY: Noted pre-transplant.  Home CPAP 6-12 cm H2O.  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  MRI (10/23) with multifocal subacute infarcts within both cerebral hemispheres and left cerebellum.  EEG without seizures 10/22, ammonia normal.  DDx include surgery v embolic v infectious (see above regarding ID work up).  Heparin drip started 10/23.   Repeat stroke code 10/25 with marked decrease in responsiveness with sedation wean.  Pupils not equal (3 mm R, 2 mm L with extropia) and gag reflex initially absent.  CTA head without obvious new pathology, MRI brain (with and without contrast) primarily revealing for infarct, low likelihood of PRES.  " VEEG per neuro with severe diffuse encephalopathy.  Etiology of CVA likely 2/2 afib, PFO, or perioperative.  - Very gradual clinical improvement  - Heparin management per neurology and primary  - ID workup and management as above     Afib with RVR: Noted 10/18, started on amiodarone drip and transitioned to PO 10/21.  Currently in SR.  Heparin drip started 10/23 as above.  Transitioned to 200 mg daily amiodarone dosing on 10/29 (anticipate 4 week course).     BRENNAN  Rising BUN: Baseline creatinine 0.7.  BRENNAN noted POD #1, UO also downtrending.  Improving with aggressive diuresis but now worsened with worsened BUN. BUN rise may be from recent PEG/J, over-diuresis, less likely TFs. Creat elevated 10/31. Hold off on diuresis and reassess daily.     Elevated LFTs, Resolved: Shock liver post-op.  Initial ALT//230 evening of surgery, then with marked increase to 1550/1246 POD #1.  Now resolved (10/28).     GI bleed, Resolved: Attributed to mechanical trauma.  Hemoglobin dropped to 6.6 10/22, s/p 2 units pRBC.  OG tube with bloody output, EGD 10/23 noted NJ/OG tube trauma with scant oozing.  IV PPI BID.  Hemoglobin continues to drift down but no apparent source of active bleeding.  Check iron studies.     We appreciate the excellent care provided by the CVICU and CVTS teams.  Recommendations communicated via in person rounding and this note.  Will continue to follow along closely, please do not hesitate to call with any questions or concerns.      Jody Sifuentes MD MSC[facility]     Subjective & Interval History:     No major events overnight, per nursing note not following commands and creamy thick secretions.      Review of Systems:     Please see HPI, otherwise the complete 10 point ROS is negative.     Physical Exam:     Vital signs:  Temp: 99  F (37.2  C) Temp src: Bladder BP: 136/77 Pulse: 109   Resp: 29 SpO2: 94 % O2 Device: Mechanical Ventilator Oxygen Delivery: 15 LPM Height: 162.6 cm (5' 4\") Weight: 69.5 kg (153 lb 3.5 " oz)  I/O:   Intake/Output Summary (Last 24 hours) at 11/1/2021 0751  Last data filed at 11/1/2021 0700  Gross per 24 hour   Intake 2212 ml   Output 2865 ml   Net -653 ml       GENERAL: alert, NAD  HEENT: NCAT, EOMI  Neck: Shiley #6 DCT, cuff up, on the vent (PSV)  Lungs: good air flow, inspiratory crackles and ronchi  CV: RRR, S1S2, no murmurs noted  Abdomen: normoactive BS, soft, non tender  Neuro: Does not follow commands, moves his head to his name being called, but does not track.   Psychiatric: unable to assess  Skin: no rash, jaundice or lesions on limited exam  Extremities: No clubbing, cyanosis or edema.      Lines, Drains, and Devices:   Peripheral IV 10/25/21 Right;Anterior;Lateral Lower forearm (Active)   Site Assessment WDL 11/01/21 0400   Line Status Infusing;Checked every 1-2 hour 11/01/21 0400   Dressing Intervention Dressing reinforced 11/01/21 0400   Phlebitis Scale 0-->no symptoms 11/01/21 0400   Infiltration Scale 0 11/01/21 0400   Infiltration Site Treatment Method  None 10/31/21 1600   If infiltrated, was a vesicant infusing? No 10/30/21 0400   Number of days: 7       Peripheral IV 10/25/21 Anterior;Distal;Right Upper arm (Active)   Site Assessment WDL 11/01/21 0400   Line Status Infusing;Checked every 1-2 hour 11/01/21 0400   Dressing Intervention Dressing reinforced 11/01/21 0400   Phlebitis Scale 0-->no symptoms 11/01/21 0400   Infiltration Scale 0 11/01/21 0400   Infiltration Site Treatment Method  None 11/01/21 0400   If infiltrated, was a vesicant infusing? No 10/30/21 0400   Number of days: 7     Data:     LABS    CMP:   Recent Labs   Lab 11/01/21  0646 11/01/21  0556 11/01/21  0417 11/01/21  0204 10/31/21  1356 10/31/21  1349 10/31/21  0650 10/31/21  0610 10/30/21  0956 10/30/21  0945 10/30/21  0514 10/30/21  0503 10/29/21  0627 10/29/21  0542 10/28/21  0607 10/28/21  0530   NA  --   --   --   --   --  146*  --  146*  --   --   --  142  --  139   < > 143   POTASSIUM  --   --   --   --    --  4.3  --  3.7  --  4.0  --  3.7   < > 4.6   < > 5.0   CHLORIDE  --   --   --   --   --  112*  --  109  --   --   --  109  --  107   < > 110*   CO2  --   --   --   --   --  28  --  30  --   --   --  26  --  26   < > 30   ANIONGAP  --   --   --   --   --  6  --  7  --   --   --  7  --  6   < > 3   * 109* 122* 140*   < > 138*   < > 114*   < >  --    < > 160*   < > 128*   < > 169*   BUN  --   --   --   --   --  102*  --  108*  --   --   --  93*  --  72*   < > 60*   CR  --   --   --   --   --  1.13  --  1.34*  --   --   --  1.25  --  1.23   < > 1.04   GFRESTIMATED  --   --   --   --   --  72  --  59*  --   --   --  64  --  65   < > 80   ERIK  --   --   --   --   --  9.4  --  9.2  --   --   --  8.9  --  9.0   < > 9.0   MAG  --   --   --   --   --   --   --  2.2  --   --   --  2.3  --  1.8  --  1.8   PHOS  --   --   --   --   --   --   --  4.0  --   --   --  4.1  --  4.9*  --  4.2   PROTTOTAL  --   --   --   --   --   --   --  5.7*  --   --   --  5.7*  --  5.8*  --  5.9*   ALBUMIN  --   --   --   --   --   --   --  1.6*  --   --   --  1.6*  --  1.0*  --  1.7*   BILITOTAL  --   --   --   --   --   --   --  0.2  --   --   --  0.2  --  0.3  --  0.7   ALKPHOS  --   --   --   --   --   --   --  127  --   --   --  108  --  106  --  130   AST  --   --   --   --   --   --   --  20  --   --   --  17  --  20  --  21   ALT  --   --   --   --   --   --   --  33  --   --   --  29  --  35  --  47    < > = values in this interval not displayed.     CBC:   Recent Labs   Lab 10/31/21  0610 10/30/21  0944 10/30/21  0503 10/29/21  0542   WBC 26.6* 37.5* 36.1* 38.8*   RBC 2.66* 2.72* 2.81* 3.04*   HGB 8.2* 8.5* 8.6* 9.4*   HCT 26.9* 27.0* 28.0* 30.4*   * 99 100 100   MCH 30.8 31.3 30.6 30.9   MCHC 30.5* 31.5 30.7* 30.9*   RDW 16.9* 16.6* 16.3* 16.0*    274 207 220       INR:   Recent Labs   Lab 10/31/21  0610 10/30/21  0503 10/29/21  0542 10/28/21  0530   INR 1.17* 1.10 1.13 1.13       Glucose:   Recent Labs   Lab  11/01/21  0646 11/01/21  0556 11/01/21  0417 11/01/21  0204 10/31/21  2357 10/31/21  2220   * 109* 122* 140* 139* 133*       Blood Gas:   Recent Labs   Lab 11/01/21  0412 10/31/21  0530 10/30/21  0531 10/26/21  0522 10/25/21  2113   PHV  --   --   --   --  7.47*   PCO2V  --   --   --   --  36*   PO2V  --   --   --   --  78*   HCO3V  --   --   --   --  27   LORI  --   --   --   --  2.9*   O2PER 50 50 50   < > 55    < > = values in this interval not displayed.       Culture Data No results for input(s): CULT in the last 168 hours.    Virology Data:   Lab Results   Component Value Date    FLUAH1 Negative 10/17/2021    FLUAH3 Negative 10/17/2021    FK4817 Negative 10/17/2021    IFLUB Negative 10/17/2021    RSVA Negative 10/17/2021    RSVB Negative 10/17/2021    PIV1 Negative 10/17/2021    PIV2 Negative 10/17/2021    PIV3 Negative 10/17/2021    HMPV Negative 10/17/2021    HRVS Negative 10/17/2021    ADVBE Negative 10/17/2021    ADVC Negative 10/17/2021       Historical CMV results (last 3 of prior testing):  Lab Results   Component Value Date    CMVQNT Not Detected 10/26/2021     No results found for: CMVLOG    Urine Studies    Recent Labs   Lab Test 10/25/21  1507 10/22/21  1641   URINEPH 5.5 7.5*   NITRITE Negative Negative   LEUKEST Negative Negative   WBCU 2 3       Most Recent [facility] Pulmonary Function Testing (FVC/FEV1 only)  No results found for: 20002  No results found for: 20003  No results found for: 20015  No results found for: 20016    IMAGING    Recent Results (from the past 48 hour(s))   XR Chest Port 1 View    Narrative    Exam: XR CHEST PORT 1 VIEW, 10/30/2021 8:31 AM    Indication: s/p lung transplant    Comparison: Chest radiograph 10/29/2021.    Findings:   Portable AP 40 degree upright chest radiograph. The patient is  rotated. Postsurgical changes from lung transplant demonstrated with  transverse skin staples over the inferior chest and clamshell  sternotomy wires. Tracheostomy tube is now in  place with tip over the  mid trachea. The feeding tube and nasogastric tube have been removed.    Trachea projects to the left secondary to the degree of rotation. Lung  volumes are slightly low with mild pulmonary congestion and increased  streaky basilar opacities. Bilateral pleural effusions likely similar  allowing for difference in projection. No definite pneumothorax.  Osseous structures stable.    Upper abdomen grossly within normal limits.      Impression    Impression:   1.  Tracheostomy tube now demonstrated. Gastric and feeding tubes have  been removed.  2.  Postsurgical changes from lung transplant.  3.  Mild pulmonary congestion. Increased basal atelectasis. Small  pleural effusions.    KAMLA SHIN MD         SYSTEM ID:  [id number]   XR Chest Port 1 View    Narrative    EXAM: XR CHEST PORT 1 VW 10/31/2021 8:32 AM    HISTORY: Status post lung transplant    COMPARISON: 10/30/2021    FINDINGS: Portable AP view of the chest. Tracheostomy tube projects  over the mid trachea. Surgical changes of the chest with clamshell  sternotomy wires and skin staples. Cardiomediastinal silhouette is  stable. No pneumothorax. Small bilateral pleural effusions. Unchanged  right basilar airspace opacity. Upper abdomen is unremarkable.      Impression    IMPRESSION:  1. Small bilateral pleural effusions with unchanged bibasilar  opacities, likely atelectasis.  2. Stable postsurgical chest.    I have personally reviewed the examination and initial interpretation  and I agree with the findings.    KAMLA SHIN MD         SYSTEM ID:  [id number]   XR Chest Port 1 View    Narrative    Exam: XR CHEST PORT 1 VIEW, 11/1/2021 1:05 AM    Indication: s/p lung transplant    Comparison: 10/31/2021    Findings:   Tracheostomy unchanged from prior. Cardiac silhouette is unchanged  from prior. Postsurgical changes of lung transplantation similar to  prior. Clamshell sternotomy wires. Small bilateral pleural effusions.  Slightly  increased hazy retrocardiac airspace opacity. Right basilar  airspace opacities are unchanged.      Impression    Impression:   1. Slightly increased hazy retrocardiac airspace opacity. Hazy right  basilar airspace opacities are unchanged.  2. Small pleural effusions.    I have personally reviewed the examination and initial interpretation  and I agree with the findings.    KAMLA SHIN MD         SYSTEM ID:  [id number]

## 2021-11-01 NOTE — PROGRESS NOTES
"Otolaryngology Progress Note  November 1, 2021    S: No report of recent bleeding from the right ear.     O: BP (!) 148/86   Pulse 108   Temp 99.5  F (37.5  C) (Bladder)   Resp 20   Ht 1.626 m (5' 4\")   Wt 69.5 kg (153 lb 3.5 oz)   SpO2 97%   BMI 26.30 kg/m     General: Lying in bed, NAD, unresponsive   HEENT: Right ear with hard dried clot over otowicks, saturated with Afrin and floxin drops. Dried clot and abhay were slowly removed. There was some mild oozing from the excoriation along the floor of the canal, remainder of canal widely patent without erythema or swelling. No brisk bleeding. TM intact, no erythema or bulging.    Pulmonary: mechanically ventilated via trach    LABS:  ROUTINE IP LABS (Last four results)  BMP  Recent Labs   Lab 11/01/21  0819 11/01/21  0818 11/01/21  0646 11/01/21  0556 10/31/21  1356 10/31/21  1349 10/31/21  0650 10/31/21  0610 10/30/21  0956 10/30/21  0945 10/30/21  0514 10/30/21  0503   NA  --  149*  --   --   --  146*  --  146*  --   --   --  142   POTASSIUM  --  4.7  --   --   --  4.3  --  3.7  --  4.0   < > 3.7   CHLORIDE  --  116*  --   --   --  112*  --  109  --   --   --  109   ERIK  --  9.6  --   --   --  9.4  --  9.2  --   --   --  8.9   CO2  --  28  --   --   --  28  --  30  --   --   --  26   BUN  --  99*  --   --   --  102*  --  108*  --   --   --  93*   CR  --  1.08  --   --   --  1.13  --  1.34*  --   --   --  1.25   * 146* 106* 109*   < > 138*   < > 114*   < >  --    < > 160*    < > = values in this interval not displayed.     CBC  Recent Labs   Lab 11/01/21  0818 10/31/21  0610 10/30/21  0944 10/30/21  0503   WBC 28.4* 26.6* 37.5* 36.1*   RBC 3.08* 2.66* 2.72* 2.81*   HGB 9.6* 8.2* 8.5* 8.6*   HCT 31.4* 26.9* 27.0* 28.0*   * 101* 99 100   MCH 31.2 30.8 31.3 30.6   MCHC 30.6* 30.5* 31.5 30.7*   RDW 17.8* 16.9* 16.6* 16.3*    196 274 207     INR  Recent Labs   Lab 11/01/21  0818 10/31/21  0610 10/30/21  0503 10/29/21  0542   INR 1.13 1.17* " 1.10 1.13       A/P: Edson Thornton is a 56 year old male with a past medical history of NSIP/ILD s/p BSLT on 10/16/21 and subsequent CVA on 10/22/21. Hospital course further complicated by afib w/ RVR, BRENNAN, GI bleed 2/2 NJ/OG trauma, candidemia and fevers with ongoing tenuous neuro status since stroke. ENT was consulted for bleeding from the right ear canal. Small superficial excoriation of the lateral canal floor noted on exam. Otowicks x 3 placed in the canal and saturated with Afrin spray and hemostasis achieved 10/28. Otowicks and dried clot removed 11/1.       - Continue Floxin drops x 7 day course (will complete 11/4)  - Floxin otic drops BID to right ear x 5 days as prophylaxis for infection  - If bleeding is noted from the right ear, apply Afrin PRN   - OK to place cotton ball or 2x2 gauze pad in conchal bowl as needed  - Anticipate some oozing s/p removal of the otowicks and clot, please contact ENT if bleeding becomes brisk despite Afrin PRN   - Remainder of care per primary team, ENT will sign off at this time. Please contact if new issues arise     Fallon Russ PA-C  Otolaryngology-Head & Neck Surgery  Please contact ENT with questions by dialing [phone] and entering job code [phone] when prompted.

## 2021-11-01 NOTE — PLAN OF CARE
Pertinent PMH: Edson is being followed by CVTS who was initially admitted on 09/05 for ILD and transplant w/o. 10/16 Pt underwent a BSLT c/b CVA/encephalopathy on 10/22 as well as candidia & fungal infections, BRENNAN and respiratory failure requiring trach & PEG placement on 10/28.  Major Shift Events: Pupils equal and reactive. Withdrawals to pain in the lower extremities. Does not follow commands, arouses to voice, team aware and neurology following. CMV-AC settings on vent at 14/50%/400/8, creamy thick secretions from trach. Trach dome changed. SR, HR in 80's, rarely bradys down to 50's, resolves without intervention. MAP 80 - 100. TF at 45mL/HR and 75 free water Q4Hr. G-tube to gravity with 100 mL OP. TF to J-tube. Insulin gtt ranges from 3 - 4 U/Hr. UOP of 100 mL/Hr, large loose incontinent stool in rectal pouch. All dressings changed. Heparin gtt at 850, last 10A therapeutic.  Plan: Neuro, ENT, transplant following. Possible need for repeat brain MR once stable. ENT is to remove packing in R ear. Continue to monitor.     For vital signs, hemodynamics and complete assessments, please see documentation flowsheet.

## 2021-11-01 NOTE — PROGRESS NOTES
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 Corral.  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    OVERNIGHT EVENTS:  NAEO    TODAY'S PROGRESS:   - add imodium  - discontinue hydralazine and isordil   - increase lantus  - discuss diuresis, azotemia with renal      PLAN:  Neuro/ pain/ sedation:  Acute postoperative pain  Anxiety and depression  Acute to subacute CVA, b/l cerebral hemispheres and L cerebellum, suspect embolic  Encephalopathy and diffuse weakness  R ear lateral canal floor excoriation with bleeding     - Pain and sedation: none  - scheduled APAP, lido patch   - appreciate ENT, continue otowicks, floxin drops x5d, afrin prn   - appreciate neuro    Pulmonary care:   SALTY on home CPAP  Acute hypoxic respiratory failure s/p b/l lung transplant 10/16/21  S/p tracheostomy 10/29  - Vent: CMV, 14/400/8/wean  - Levalbuterol nebs and Mucomyst, chest PT  - Daily CXR  - PST BID  - diuresis as below   - appreciate Pulmonary     Cardiovascular:    HTN  Afib   PFO  Cardiogenic shock followed by severe HTN - now normotensive    - Monitor hemodynamic status.   - MAP goal <100, SBP <140  - Amlodipine 10 mg daily, coreg 25 BID, discontinue hydralazine and isordil   - prn labetalol  - rosuvastatin 10mg  - Amiodarone 200 mg daily   - low-intensity heparin gtt     GI care/ Nutrition:   Elevated LFTs - resolved   GI bleed 2/2 NG trauma per GI, resolved   Moderate malnutrition in the context of acute on chronic illness  - Diet: TF at goal via PEG-J, added fiber, will monitor  - PPI BID  - imodium today     Renal/ Fluid Balance/ Electrolytes:   Acute kidney injury, recurrent, now with azotemia   Hypernatremia,  improved with FWF  BL creat appears to be ~ 0.7  - free water flushes 75 q4  -  hold diuresis today until d/w renal    - Strict I/O, daily weights  - Avoid/limit nephrotoxins as able  - Replete lytes PRN per protocol     Endocrine:    Stress induced hyperglycemia with steroid-induced component, on DM2  Hypothyroidism  RA  Preop A1c 6.6  - insulin gtt, lantus 40u   - Goal BG <180 for optimal healing  - continue home synthroid     ID/ Antibiotics:  Immunosuppression s/p transplant  Candidal infection of donor lungs (likely source), pleural fluid, and fungemia   - NGTD CSF. Last + blood cx 10/22. No vegetations on valves per TC 10/23  - continue fluconazole pending clinical course  - empiric meropenem x5d  - Nystatin, Acyclovir, bactrim   - Tacrolimus (via g tube), prednisone   - Follow cultures   - appreciate transplant ID, ophthalmology   - Continue to monitor fever curve, WBC and inflammatory markers as appropriate     Heme:     Acute blood loss anemia and anemia related to critical illness and iron deficiency  Hypogammaglobulinemia s/p IVIG  Thrombocytopenia, HIT negative - resolved   No s/sx active bleeding  - CBC   - ID as above  - iron studies consistent with iron deficiency, will not treat for now given infection    MSK/ Skin:  Clamshell surgical incision  - Sternal precautions  - Postoperative incision management per protocol  - PT/OT/CR  - reassess staples in AM (at 2 weeks)      Prophylaxis:    - Mechanical prophylaxis for DVT: SCDs  - Chemical DVT prophylaxis: heparin gtt    - PPI for 30 days postoperatively     Lines/ tubes/ drains:  - trach  - peg-j  - Watson 10/25    - PIVs    Disposition:  - [facility] ICU.      Patient seen, findings and plan discussed with CV ICU staff.     Altagracia Allan MD  Surgery Resident PGY-3  Pg [id number]    ====================================    SUBJECTIVE:   NAEO.  Opens eyes to voice. Guppy breathing with PST on trach     OBJECTIVE:   1. VITAL SIGNS:   Temp:  [98.6  F (37  C)-100  F (37.8   C)] 100  F (37.8  C)  Pulse:  [] 80  Resp:  [20-33] 26  BP: ()/(61-86) 97/61  FiO2 (%):  [50 %] 50 %  SpO2:  [93 %-100 %] 95 %  Ventilation Mode: (S) CPAP/PS  (Continuous positive airway pressure with Pressure Support)  FiO2 (%): 50 %  Rate Set (breaths/minute): 14 breaths/min  Tidal Volume Set (mL): 400 mL  PEEP (cm H2O): 8 cmH2O  Pressure Support (cm H2O): 10 cmH2O  Oxygen Concentration (%): 50 %  Resp: 26    2. INTAKE/ OUTPUT:   I/O last 3 completed shifts:  In: 2212.5 [I.V.:517.5; NG/GT:660]  Out: 2960 [Urine:1830; Emesis/NG output:130; Stool:1000]    3. PHYSICAL EXAMINATION:   General: critically ill  Neuro: opens and closes eyes and mouth, moving head. Moves BLE to noxious stimulus, no movement BUE to noxious    Resp: vented via trach, pressure supporting at present   CV: RRR on tele   Abdomen: nondistended  Incisions: c/d/i  Extremities: warm and well perfused, no edema    4. INVESTIGATIONS:   Arterial Blood Gases   Recent Labs   Lab 11/01/21  0412 10/31/21  0530 10/30/21  0531 10/29/21  0502   PH 7.55* 7.49* 7.52* 7.53*   PCO2 35 41 37 37   PO2 89 87 77* 97   HCO3 31* 31* 30* 31*     Complete Blood Count   Recent Labs   Lab 11/01/21  0818 10/31/21  0610 10/30/21  0944 10/30/21  0503   WBC 28.4* 26.6* 37.5* 36.1*   HGB 9.6* 8.2* 8.5* 8.6*    196 274 207     Basic Metabolic Panel  Recent Labs   Lab 11/01/21  1013 11/01/21  0819 11/01/21  0818 11/01/21  0646 10/31/21  1356 10/31/21  1349 10/31/21  0650 10/31/21  0610 10/30/21  0956 10/30/21  0945 10/30/21  0514 10/30/21  0503   NA  --   --  149*  --   --  146*  --  146*  --   --   --  142   POTASSIUM  --   --  4.7  --   --  4.3  --  3.7  --  4.0   < > 3.7   CHLORIDE  --   --  116*  --   --  112*  --  109  --   --   --  109   CO2  --   --  28  --   --  28  --  30  --   --   --  26   BUN  --   --  99*  --   --  102*  --  108*  --   --   --  93*   CR  --   --  1.08  --   --  1.13  --  1.34*  --   --   --  1.25   * 140* 146* 106*   < >  138*   < > 114*   < >  --    < > 160*    < > = values in this interval not displayed.     Liver Function Tests  Recent Labs   Lab 11/01/21 0818 10/31/21  0610 10/30/21  0503 10/29/21  0542   AST 50* 20 17 20   ALT 57 33 29 35   ALKPHOS 166* 127 108 106   BILITOTAL 0.2 0.2 0.2 0.3   ALBUMIN 1.8* 1.6* 1.6* 1.0*   INR 1.13 1.17* 1.10 1.13     Pancreatic Enzymes  No lab results found in last 7 days.  Coagulation Profile  Recent Labs   Lab 11/01/21  0818 10/31/21  0610 10/30/21  0503 10/29/21  0542   INR 1.13 1.17* 1.10 1.13        =========================================

## 2021-11-01 NOTE — PROGRESS NOTES
[facility]  Transplant Infectious Disease Progress Note     Patient:  Edson Thornton, Date of birth 1965, Medical record number [id number]  Date of Visit:  11/01/2021         Assessment and Recommendations:   Recommendations:  - Continue the empiric meropenem for another 24 hours, but if its effect remains unclear tomorrow (after a five day trial), would then discontinue it.  - Continue fluconazole 400 mg daily through at least 11/8/21 (two weeks from the date of the PICC line's removal; for the candidemia and Candida empyema).  - Continue TMP-SMX and acyclovir prophylaxis.  - Would not add additional antimicrobials now (in the absence of overt sepsis.)  - The 10/29/21 CSF and 10/30/21 BAL results are not suggestive of an infection in either location.    Transplant ID will follow with you.    Prem Soares MD  Pager [phone]    Assessment:  A 56 year old gentleman immunosuppressed (tacrolimus, mycophenolate, prednisone) s/p a 10/16/21 bilateral lung transplant for NSIP / ILD with rheumatoid arthritis who also has a history of bronchiectasis, moderate PH, SALTY, chronic HSV infection, hypogammaglobulinemia, steroid-induced diabetes, hypothyroidism, hypertension, hyperlipidemia, duodenal anomaly, anxiety, and depression.  He was admitted to [facility] on 9/5/21 for acute on chronic respiratory failure due to an ILD exacerbation and underwent lung transplant on 10/16/21.  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  He had fevers on 10/19/21 and on 10/20/21 and a corresponding 10/20/21 blood culture grew pan-susceptible Candida albicans.  He is stable but ventilator dependent (on low vent settings, s/p 10/29/21 tracheostomy) with a multifactorial encephalopathy and persistent leukocytosis.    ID issues:    - Candidemia in a single 10/20/21 blood culture:  Although only one blood culture (out of six post-transplant to that point) grew Candida, that positive blood culture  did correspond to a significant fever spike on 10/20/21 late PM and he had a strongly positive 10/20/21 Fungitell assay, so this very possibly represented a true candidemia and needs to be treated as such.  He is at risk for candidemia due to the presence of multiple lines.  There is no visible site of organ involvement or of hepatosplenic candidiasis on the 10/22/21 and 10/25/21 chest / abdomen CT scans (except perhaps the left pleural effusion).  A 10/23/21 TTE showed no evidence of valvular vegetations.   A 10/24/21 Ophthalmology Consult examination was benign and 10/22/21 x 2, 10/23/21 x 2, and 10/25/21 x 3 surveillance blood cultures are all without growth to date.  Empiric therapy with IV micafungin at Candida treatment dose 100 mg daily has been appropriate to give a two week course through 11/8/21 from the date of the removal of the PICC line on 10/25/21 (with other lines removed previously), but since the C albicans is quite susceptible to fluconazole (TESSIE 0.5) and his LFTs have normalized, finishing out the treatment course with fluconazole on 10/26/21 would be more elegant -- Pulmonary Transplant Consult service agrees and will adjust the tacrolimus dosing (as needed) accordingly.  If surveillance blood cultures are still without growth, a new central line can be placed after a 48 hour line holiday, on 10/27/21.    - Isolation of yeast from 10/25/21 left pleural fluid:  We await the associated culture, fluid cell count (if sent), and cytology, but the candidemia or the transplant itself may possibly have seeded the left pleural space, in which case a longer course of antifungal therapy may be necessary instead of a straight two weeks.    - New, acute, cryptogenic 10/25/21 leukocytosis / fever:  Etiology is unclear -- perhaps due to the left fungal empyema.  This fever is unlikely due to the candidemia itself, since his blood cultures cleared quickly as of 10/22/21.  Other fever evaluation to date has been  negative.  A 10/29/21 AM procalcitonin was quite low at 0.38 (with only mild renal insufficiency, creatinine 1.23).  Despite absence of clear indication, empiric meropenem was added on 10/28/21 evening and, so far, some possible response seems to be evident, with a lowered fever curve and slightly improved leukocytosis:  Given that result, will continue the meropenem temporarily (for ~ 72 hours) to give a more definitive trial.  Would definitely not add any other additional antimicrobials at this time, since that would only confuse the picture.    - Sputum Gram stain from 10/25/21 showing Gram positive cocci:  Gram positive cocci in sputum may very well be normal jean marie (coagualse negative Staph) in the absence of other new indicators of a new pneumonia, so would not treat this while awaiting the culture result.  The 10/26/21 chest x-ray shows no new consolidations consistent with pneumonia (and actually seems to be improving).    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  The RBC adjusted CSF WBC count is about zero.  The [employer] CSF PCR panel is negative for herpesviral and other pathogens and a CSF CRAG was negative.    Old ID issues:  - Concern for possible Strongyloides exposure (because a [place]  and in multiple countries with endemic Strongyloides) pre-transplant so received ivermectin dose on 9/17/21.  - Plan for monthly Coccidioides Ag x12 months post-transplant per ID (urine and serum negative 10/17), next due 11/17 (not yet ordered).  Has calcified granulomas on chest CT.  Has a history of residing in a Coccid[place]ides endemic areas [place] and[place]or Histoplasma endemic area ([place]   - Pre-transplant indeterminate Quantiferon assay:  Assessed 9/16/21 by  Transplant ID.  Had been in the [employer] and was stationed in many states including [facility] and [facility]. Also was stationed in [facility] and [facility] and maybe [place].  He was tested with tuberculin skin test on numerous occasions before he became immunocompromised and was never positive. In addition, he was tested with QuantIFERON on 5/11/2021 prior to, or within 24 hr of receiving 10 mg/day of prednisone, the QuantiFERON was negative.  Deemed to no have LTBI.  - Pneumonia treated with a short course of antibiotics in [place].      Other ID issues:  - QTc interval:  466 msec on 10/22/21 EKG.  - Bacterial prophylaxis:  None indicated, has been on post-transplant ceftazidime.  - Pneumocystis prophylaxis:  On TMP-SMX prior to transplant, initially held on admission for BRENNAN, resumed 10/27/21.  - Viral serostatus & prophylaxis:  CMV negative.  Chronic history of intermittent active HSV oral outbreaks, on acyclovir.  EBV / VZV seropositive.  - Fungal prophylaxis:  On treatment micafungin.  - Immunization status:  Not presently relevant.  Did not receive influenza, pneumococcal, or Covid-19 vaccines pre-transplant.  - Gamma globulin status:  History of hypogammaglobulinemia.  Received IVIG on 10/21/21.  - Isolation status: Routine.        Interval History:   Mr. Thornton continues to have low-grade fevers (T max 100.4 degrees F today) since 10/24/21 midnight.  His peripheral leukocytosis improved over the weekend down to 26.6 yesterday (from a peak of 41.0 on 10/28/21, versus 20 - 23K between 10/22 - 24/21) but is back up a bit to 28.4 today.  He remains on empiric meropenem (since 10/28/21 PM, for cryptogenic fever and leukocytosis) without obvious benefit, and also remains on fluconazole (since 10/26/21, for candidemia and left Candida empyema) plus plus TMP-SMX (resumed 10/25/21) and acyclovir prophylaxis (since his 10/16/21 transplant).  He remains unresponsive (off sedation) and ventilator dependent on low  settings s/p a tracheostomy placed 10/29/21 with full tubes feeds via PEG J tube (also placed 10/29/21).  His trach secretions are moderate, thick, and cream colored.  A repeat BAL on 10/30/21 showed yeast and staphoid Gram positive cocci on cytology / Gram stain, but only normal jean marie and yeast have grown to date on the cultures.  A 10/26/21 BAL culture grew C albicans.  CSF studies from the 10/29/21 traumatic LP remain negative (with a WBC count difficult to interpret but probably normal given 9,000 RBCs in tube 4).  His prior right ear bleeding seems to have ceased.  His chest x-rays recently show ongoing bibasilar atelectasis, bilateral small pleural effusions, and some potential mild pulmonary edema.  He has been diuresed although his weight remains substantially above his admission wieght.  The prior right arm PICC was removed 10/25/21 PM and a central line holiday continues.    The 10/29/21 lumbar puncture had an opening pressure of 9 cm H2O, a cell count indicative of a traumatic tap (pink in tube 4, cloudy, WBC 80 (94% N, 3% L, 4% M), RBC 9,000), glucose 83, protein 97, [employer]fire menigioencephalitis PCR panel negative, CRAG negative, and other cultures and studies pending.  The RBC-adjusted CSF WBC count is about zero.  There is no other new microbiology today.  A 10/25/21 left pleural fluid culture grew 2+ C albicans.  The 10/20/21 blood culture and 10/20/21 and 10/22/21 ETT sputum cultures also all grew Candida albicans, with the blood culture isolate pan-susceptible (micafungin TESSIE <0.03, fluconazole TESSIE 0.5).  The other 10/20/21 blood culture as well as 10/22/21 x 2, 10/23/21 x 2, and 10/25/21 x 3 surveillance blood cultures were all negative.  The 10/23/21 serum cryptococcal antigen assay was negative.  10/22/21 and 10/25/21 abdominal CT scans showed no evidence of hepatosplenic candidiasis or other intrabdominal infection.  10/23 TTE showed no vegetations.  A 10/24/21 Ophthalmology Consult examination  was benign.        History of Infectious Disease Illness (copied from the 10/23/21 Transplant ID Consult Note):   A 56 year old gentleman immunosuppressed (tacrolimus, mycophenolate, prednisone) s/p a 10/16/21 bilateral lung transplant for NSIP / ILD with rheumatoid arthritis who also has a history of bronchiectasis, moderate PH, SALTY, chronic HSV infection, hypogammaglobulinemia, steroid-induced diabetes, hypothyroidism, hypertension, hyperlipidemia, duodenal anomaly, anxiety, and depression.  He was admitted to [facility] on 9/5/21 for acute on chronic respiratory failure due to an ILD exacerbation and underwent lung transplant on 10/16/21.  The transplant surgery was complicated by early low cardiac index and immediate post-operative PGD as well as later atrial fibrillation with RVR on 10/18/21 as well as BRENNAN.  He is now day 7 post transplant and remains intubated in the CVICU on inhaled nitrous oxide with compromised capacity to wean off sedation.  He had immediate post-operative fevers for ~ 24 hours, then was afebrile for ~ 1.5 days, then respiked fevers up to 101.3 degrees F on 10/19/21 late PM and 10/20/21 late PM.  He had another low grade fever to 100.9 degrees on 10/22/21 PM.  He has been on empiric ceftazidime since transplant, as well as acyclovir prophylaxis (and was on TMP-SMX prophylaxis prior to transplant).  His post-operative peripheral WBC peaked at 44.5 on 10/18/21, fell to 27.4 by 10/20/21, and is now at 21.3.  Blood cultures x 4 obtained on 10/17/21 were negative, but one of two blood cultures peripherally drawn on 10/20/21 early AM (with the fever spike) grew yeast (ID pending) on 10/22/21 at 52 hours of incubation; the other peripherally drawn blood culture lacks growth to date.  Surveillance blood cultures x 2 from 10/22/21 afternoon are without growth to date.  A Fungitell assay was positive at 399 on 10/20/21.  Of note, respiratory (broncial washing, ETT sputum) cultures obtained 10/17/21 and  10/20/21 have grown Candida albicans along with Staph epidermidis and other normal jean marie.  IV micafungin 100 mg daily was instituted on 10/22/21 late AM.  A 10/22/21 chest / abdominal CT scan did not reveal any likely source.  He has a history of potential Coccidioides exposure, but a 10/17/21 Coccidioides antibody assay was negative and a Coccidioides antigen assay is pending.  This morning he had some blood in his OG tube and underwent upper endoscopy.  He remains intubated but is opening his eyes to auditory stimulation and tracking today, although not moving to request.  He is hemodynamically stable.    Exposure History:  Had been in the [employer] and was stationed in many states including [facility] and [facility]. Also was stationed in [facility] and [facility] and maybe [place].  Has a history of residing in a [place]idioides endemic areas [place] and/or Histoplasma endemic area [place]  Extensive travel as a  within the [place].      Transplants:  10/16/2021 (Lung), Postoperative day:  16.  Coordinator Kassandra Estrada    Review of Systems:  ROS is unobtainable because he is intubated, sedated, and unresponsive on the ventilator.    Past Medical History:   Diagnosis Date     BRENNAN (acute kidney injury) (H) 10/17/2021     Anxiety      Depression      HLD (hyperlipidemia)      HTN (hypertension)      Hypothyroidism      ILD (interstitial lung disease) (H)      SALTY on CPAP      Oxygen dependent     BL 4L since ~6/2021     Rheumatoid arthritis (H)     signs ~5/2020, dx 5/2021     S/P lung transplant (H) 10/16/2021     Shock liver 10/17/2021     Steroid-induced hyperglycemia      Traction bronchiectasis (H)      Past Surgical History:   Procedure Laterality Date     COLONOSCOPY W/ BIOPSIES AND POLYPECTOMY  07/21/2020     CV CORONARY ANGIOGRAM N/A 9/8/2021    Procedure: Coronary Angiogram with possible intervention;  Surgeon: Jovon Bullock MD;  Location:  HEART CARDIAC CATH LAB     CV RIGHT HEART CATH  MEASUREMENTS RECORDED N/A 9/8/2021    Procedure: Right Heart Cath;  Surgeon: Jovon Bullock MD;  Location:  HEART CARDIAC CATH LAB     ESOPHAGOSCOPY, GASTROSCOPY, DUODENOSCOPY (EGD), COMBINED N/A 10/23/2021    Procedure: ESOPHAGOGASTRODUODENOSCOPY (EGD);  Surgeon: Miquel Pisano MD;  Location: [facility] GI     EXTRACTION(S) DENTAL N/A 9/22/2021    Procedure: EXTRACTION tooth #19;  Surgeon: Deepak Tobin DDS;  Location: [facility] OR     HERNIA REPAIR       right acl       TRACHEOSTOMY PERCUTANEOUS N/A 10/29/2021    Procedure: Percutaneous Tracheostomy,;  Surgeon: Celine Jenkins MD;  Location: [facility] OR     TRANSPLANT LUNG RECIPIENT SINGLE X2 Bilateral 10/16/2021    Procedure: TRANSPLANT, LUNG, RECIPIENT, BILATERAL, Bronchoscopy, on-pump perfusion, bilateral clamshell sternotomy;  Surgeon: Yanick Corral MD;  Location: [facility] OR     XR ACROMIOCLAVICULAR JOINT BILATERAL       Family History   Problem Relation Age of Onset     Diabetes Type 1 Mother      Heart Disease Mother      Chronic Obstructive Pulmonary Disease Mother      Rheumatoid Arthritis Father      Emphysema Paternal Grandfather      Social History     Tobacco Use     Smoking status: Never Smoker     Smokeless tobacco: Never Used   Substance Use Topics     Alcohol use: Never     Drug use: Never       There is no immunization history on file for this patient.    Patient Active Problem List   Diagnosis     S/P lung transplant (H)     BRENNAN (acute kidney injury) (H)     Shock liver          Current Medications & Allergies:       acetaminophen  975 mg Oral or Feeding Tube Q8H MARKY     acetylcysteine  2 mL Nebulization 4x Daily     acyclovir  400 mg Oral or NG Tube BID     amiodarone  200 mg Oral or Feeding Tube Daily     amLODIPine  10 mg Oral or Feeding Tube Daily     banatrol plus  1 packet Per Feeding Tube BID     [Held by provider] bumetanide  2 mg Intravenous BID     calcium carbonate 600 mg-vitamin D 400 units  1 tablet Oral or Feeding  Tube BID w/meals     carvedilol  25 mg Oral or Feeding Tube BID     fluconazole  400 mg Oral or Feeding Tube Daily     insulin glargine  40 Units Subcutaneous QAM AC     levalbuterol  1.25 mg Nebulization 4x Daily     levothyroxine  25 mcg Oral Daily     lidocaine  2 patch Transdermal Q24H     lidocaine   Transdermal Q8H     meropenem  1 g Intravenous Q8H     multivitamins w/minerals  15 mL Per Feeding Tube Daily     mycophenolate  1,500 mg Oral or J tube BID     nystatin  1,000,000 Units Swish & Swallow 4x Daily     ofloxacin  5 drop Right Ear BID     pantoprazole  40 mg Per Feeding Tube BID AC     potassium chloride  20 mEq Oral Daily     predniSONE  15 mg Oral QAM    And     predniSONE  12.5 mg Oral QPM     protein modular  1 packet Per Feeding Tube TID     rosuvastatin  10 mg Oral or Feeding Tube Daily     senna-docusate  1 tablet Oral or Feeding Tube BID     sodium chloride (PF)  3 mL Intracatheter Q8H     sulfamethoxazole-trimethoprim  10 mL Oral or J tube Daily    Or     sulfamethoxazole-trimethoprim  1 tablet Oral or Feeding Tube Daily     tacrolimus  2 mg Per G Tube BID IS     Infusions/Drips:      dextrose       dextrose Stopped (10/24/21 1008)     heparin 850 Units/hr (11/01/21 1500)     insulin regular 6 Units/hr (11/01/21 1500)     BETA BLOCKER NOT PRESCRIBED       No Known Allergies         Physical Exam:     Patient Vitals for the past 24 hrs:   BP Temp Temp src Pulse Resp SpO2 Weight   11/01/21 1500 114/74 100.4  F (38  C) -- 101 23 96 % --   11/01/21 1400 93/61 100.4  F (38  C) -- 93 21 95 % --   11/01/21 1300 96/57 100.2  F (37.9  C) -- 90 23 93 % --   11/01/21 1200 106/67 100.2  F (37.9  C) Bladder 82 20 95 % --   11/01/21 1144 -- -- -- -- -- 95 % --   11/01/21 1100 97/61 100.2  F (37.9  C) -- 80 (!) 31 95 % --   11/01/21 1000 121/72 100  F (37.8  C) -- 86 26 94 % --   11/01/21 0900 118/75 99.9  F (37.7  C) -- 87 25 95 % --   11/01/21 0800 (!) 148/86 99.5  F (37.5  C) Bladder 108 20 97 % --    11/01/21 0743 -- -- -- -- -- 95 % --   11/01/21 0700 136/77 -- -- 109 29 94 % --   11/01/21 0600 95/61 -- -- 76 30 100 % --   11/01/21 0500 112/67 -- -- 89 25 98 % --   11/01/21 0400 118/76 99  F (37.2  C) -- 87 29 100 % --   11/01/21 0300 111/69 -- -- 89 22 97 % --   11/01/21 0200 128/73 -- -- 89 24 98 % 69.5 kg (153 lb 3.5 oz)   11/01/21 0100 124/77 -- -- 89 27 98 % --   11/01/21 0000 108/70 99.3  F (37.4  C) Bladder 90 30 97 % --   10/31/21 2300 105/74 -- -- 84 28 98 % --   10/31/21 2200 116/75 -- -- 92 25 96 % --   10/31/21 2100 -- -- -- -- (!) 33 -- --   10/31/21 2000 (!) 144/78 99.3  F (37.4  C) Bladder 103 27 95 % --   10/31/21 1900 115/74 99.3  F (37.4  C) -- 94 26 96 % --   10/31/21 1845 -- 99.1  F (37.3  C) -- 99 -- 96 % --   10/31/21 1830 -- 99  F (37.2  C) -- 96 -- 95 % --   10/31/21 1815 -- 99  F (37.2  C) -- 100 -- 96 % --   10/31/21 1800 127/78 99  F (37.2  C) -- 97 27 96 % --   10/31/21 1745 -- 99  F (37.2  C) -- 98 -- 96 % --   10/31/21 1730 -- 99  F (37.2  C) -- 92 -- 96 % --   10/31/21 1715 -- 99  F (37.2  C) -- 98 -- 96 % --   10/31/21 1700 117/68 99  F (37.2  C) -- 95 25 95 % --   10/31/21 1645 -- 99  F (37.2  C) -- 99 -- 97 % --   10/31/21 1630 -- 99  F (37.2  C) -- 92 -- 96 % --   10/31/21 1615 -- 99.1  F (37.3  C) -- 98 -- 96 % --   10/31/21 1600 127/75 99.3  F (37.4  C) Bladder 93 25 97 % --   10/31/21 1545 -- 99.7  F (37.6  C) -- 89 -- 95 % --   10/31/21 1531 -- -- -- -- -- 95 % --   10/31/21 1530 -- 99.7  F (37.6  C) -- 98 -- 94 % --   10/31/21 1515 -- -- -- 104 -- -- --     Ranges for vital signs over the past 24 hours:    Temp:  [99  F (37.2  C)-100.4  F (38  C)] 100.4  F (38  C)  Pulse:  [] 101  Resp:  [20-33] 23  BP: ()/(57-86) 114/74  FiO2 (%):  [50 %] 50 %  SpO2:  [93 %-100 %] 96 %  Vitals:    10/30/21 0200 10/31/21 0200 11/01/21 0200   Weight: 71.8 kg (158 lb 4.6 oz) 69.7 kg (153 lb 10.6 oz) 69.5 kg (153 lb 3.5 oz)     Intake/Output Summary (Last 24 hours) at 11/1/2021  1512  Last data filed at 11/1/2021 1500  Gross per 24 hour   Intake 2525.53 ml   Output 2585 ml   Net -59.47 ml     Physical Examination:  GENERAL:  Non-responsive, WDWN, 56 year old man in NAD on the ventilator via tracheostomy.  HEAD:  NCAT.  EYES:  PERRL, anicteric sclerae.  ENT:  No otorrhea.  No anterior oral lesion.  NECK:  Supple, tracheostomy site lacks inflammation.  LYMPH:  No cervical lymphadenopathy.  LUNGS:  Decreased posterior bibasilar breath sounds, anterior clear to auscultation bilaterally.  CHEST:  Wound VAC on clamshell incision.  Old cube sites dressed.  CARDIOVASCULAR:  RRR, without murmur.  ABDOMEN: Bowel sounds present, soft, nontender by monitor to palpation, GJ tube site lacks inflammation.  :  Watson with leena urine.  EXTREMITIES:  Distally warm, no edema.  SKIN:  No acute rash or lesion.  Peripheral IV line sites lack inflammation.  NEUROLOGIC:  Unresponsive, does not move to request.         Laboratory Data:     No results found for: ACD4    Inflammatory Markers    Recent Labs   Lab Test 10/29/21  0542 10/14/21  0943 09/07/21  1324 09/06/21  0633   SED 87*  --   --   --    CRP 53.0* 23.0*   < >  --    PSA  --   --   --  0.56    < > = values in this interval not displayed.     Immune Globulin Studies     Recent Labs   Lab Test 10/15/21  0907 09/07/21  1324 09/07/21  1100   * 363*  363*  --    IGM  --  51  --    IGE  --   --  83   IGA  --  103  --      Metabolic Studies       Recent Labs   Lab Test 11/01/21  1413 11/01/21  0819 11/01/21  0818 10/31/21  1356 10/31/21  1349 10/29/21  1106 10/29/21  1046 10/26/21  1717 10/26/21  1452 10/23/21  2108 10/23/21  2016 10/22/21  1602 10/22/21  1601 10/22/21  0959 10/22/21  0958 10/20/21  1004 10/20/21  0957 09/07/21  1100 09/07/21  0928   NA  --   --  149*  --  146*   < >  --    < > 151*   < >  --    < > 147*   < > 147*   < > 146*   < >  --    POTASSIUM  --   --  4.7  --  4.3   < >  --    < > 5.0   < >  --    < > 3.6   < > 3.5   < > 4.1    < >  --    CHLORIDE  --   --  116*  --  112*   < >  --    < > 121*   < >  --    < > 109   < > 106   < > 107   < >  --    CO2  --   --  28  --  28   < >  --    < > 25   < >  --    < > 31   < > 34*   < > 34*   < >  --    ANIONGAP  --   --  5   < > 6   < >  --    < > 5   < >  --    < > 7   < > 7   < > 5   < >  --    BUN  --   --  99*   < > 102*   < >  --    < > 63*   < >  --    < > 68*   < > 62*   < > 54*   < >  --    CR  --   --  1.08  --  1.13   < >  --    < > 1.16   < >  --    < > 1.47*   < > 1.54*   < > 1.60*   < >  --    GFRESTIMATED  --   --  76   < > 72   < >  --    < > 70   < >  --    < > 53*   < > 50*   < > 47*   < >  --    *   < > 146*   < > 138*   < >  --    < > 244*   < >  --    < > 148*   < > 92   < > 45*   < >  --    A1C  --   --   --   --   --   --   --   --   --   --   --   --   --   --   --   --   --   --  6.6*   ERIK  --   --  9.6  --  9.4   < >  --    < > 8.6   < >  --    < > 8.2*   < > 7.9*   < > 8.0*   < >  --    PHOS  --   --  2.7  --   --    < >  --    < > 3.6   < >  --    < > 2.8  --   --    < >  --    < >  --    MAG  --   --  2.2  --   --    < >  --    < > 1.8   < >  --    < > 2.1  --   --    < >  --    < >  --    LACT  --   --   --   --   --   --   --   --  1.3  --  0.5*   < >  --    < >  --    < >  --    < >  --    PCAL  --   --   --   --   --   --  0.38*  --   --   --   --   --   --   --   --   --   --    < >  --    FGTL  --   --   --   --   --   --   --   --   --   --   --   --   --   --   --   --  399  --   --    CKT  --   --   --   --   --   --   --   --   --   --   --   --  51  --  55  --   --    < >  --     < > = values in this interval not displayed.     Hepatic Studies    Recent Labs   Lab Test 11/01/21  0818 10/31/21  0610 10/31/21  0610 10/27/21  0523 10/26/21  0637 10/25/21  1609 10/25/21  1141 10/22/21  1601 10/22/21  0958 09/30/21  1059 09/19/21  1629   BILITOTAL 0.2  --  0.2   < > 0.3   < >  --    < > 0.3   < >  --    DBIL  --   --   --   --  0.1   < >  --    < > 0.1   < >   --    ALKPHOS 166*   < > 127   < > 160*   < >  --    < > 156*   < >  --    PROTTOTAL 6.2*   < > 5.7*   < > 5.8*   < >  --    < > 5.1*   < >  --    ALBUMIN 1.8*   < > 1.6*   < > 1.8*   < >  --    < > 1.1*   < >  --    AST 50*   < > 20   < > 21   < >  --    < > 27   < >  --    ALT 57   < > 33   < > 71*   < >  --    < > 281*   < >  --    LDH  --   --   --   --   --   --   --   --   --   --  423*   DOREEN  --   --   --   --   --   --  29  --  39  --   --     < > = values in this interval not displayed.     Pancreatitis testing    Recent Labs   Lab Test 10/22/21  0407 09/07/21  1324 09/07/21  1100   AMYLASE  --   --  32   TRIG 307*   < >  --     < > = values in this interval not displayed.     Hematology Studies   Recent Labs   Lab Test 11/01/21  0818 10/31/21  0610 10/30/21  0944 10/30/21  0503 10/30/21  0503 10/25/21  0326 10/24/21  0410 10/24/21  0410   WBC 28.4* 26.6* 37.5*  --  36.1*   < >  --  21.4*   ANEU 25.3*  --   --   --   --   --   --  19.9*   ANEUTAUTO  --  23.6*  --   --  32.6*   < >  --   --    ALYM 1.1  --   --   --   --   --    < > 0.4*   ALYMPAUTO  --  0.9  --    < > 1.1   < >  --   --    DONALD 1.1  --   --   --   --   --    < > 0.9   AMONOAUTO  --  1.3  --    < > 1.3   < >  --   --    AEOS 0.0  --   --   --   --   --    < > 0.0   AEOSAUTO  --  0.0  --    < > 0.0   < >  --   --    ABSBASO  --  0.1  --    < > 0.1   < >  --   --    HGB 9.6* 8.2* 8.5*   < > 8.6*   < >  --  9.8*   HCT 31.4* 26.9* 27.0*   < > 28.0*   < >  --  30.5*    196 274   < > 207   < >  --  201    < > = values in this interval not displayed.     Clotting Studies    Recent Labs   Lab Test 11/01/21  0818 10/31/21  0610 10/30/21  0503 10/29/21  0542 10/23/21  0344 10/22/21  1407   INR 1.13 1.17* 1.10 1.13   < > 1.28*   PTT  --   --   --   --   --  43*    < > = values in this interval not displayed.     Iron Testing    Recent Labs   Lab Test 11/01/21  0818 10/31/21  0610 10/31/21  0610 10/23/21  0344 10/22/21  0959 09/19/21  1621  09/19/21  1304 09/10/21  0616 09/09/21  0536   IRON  --   --  35  --   --   --   --   --  146   FEB  --   --  309  --   --   --   --   --  190*   IRONSAT  --   --  11*  --   --   --   --   --  77*   ARMEN  --   --   --   --   --   --   --   --  1,049*   *   < > 101*   < >  --    < > 90   < > 87   HAPT  --   --   --   --  380*   < >  --   --   --    RETP  --   --   --   --   --   --  5.2*  --   --    RETICABSCT  --   --   --   --   --   --  0.177*  --   --     < > = values in this interval not displayed.     Autoimmune Testing    Recent Labs   Lab Test 09/07/21  1324   RNPIGG Negative   SMIGG Negative   SSAIGG Negative   SSBIGG Negative     Arterial Blood Gas Testing    Recent Labs   Lab Test 11/01/21  0412 10/31/21  0530 10/30/21  0531 10/29/21  0502 10/28/21  0502 10/28/21  0502   PH 7.55* 7.49* 7.52* 7.53*  --  7.51*   PCO2 35 41 37 37  --  37   PO2 89 87 77* 97  --  114*   HCO3 31* 31* 30* 31*  --  30*   O2PER 50 50 50 45   < > 50    < > = values in this interval not displayed.     Thyroid Studies     Recent Labs   Lab Test 10/29/21  1046 10/29/21  0542 09/07/21  1100 09/06/21  0633 09/06/21  0633   TSH  --  7.90* 0.11*  --  0.19*   T4 1.04  --  0.68*   < > 0.73*    < > = values in this interval not displayed.     Urine Studies     Recent Labs   Lab Test 11/01/21  1336 10/25/21  1507 10/22/21  1641 10/17/21  1642 10/17/21  1041   URINEPH 5.5 5.5 7.5* 5.0 5.0   NITRITE Negative Negative Negative Negative Negative   LEUKEST Negative Negative Negative Negative Trace*   WBCU 0 2 3 25* 44*     Medication levels    Recent Labs   Lab Test 11/01/21  0818 10/18/21  0549 10/18/21  0403   VANCOMYCIN  --   --  12.6   TACROL 11.7   < >  --     < > = values in this interval not displayed.     Microbiology:    Fungal testing  Recent Labs   Lab Test 10/20/21  0957   FGTL 399   FGTLI Positive*     Last Culture results with specimen source  Culture   Date Value Ref Range Status   10/30/2021 2+ Normal jean marie  Final   10/30/2021  Yeast (A)  Preliminary   10/29/2021 No anaerobic organisms isolated after 2 days  Preliminary   10/29/2021 No growth after 2 days  Preliminary   10/29/2021 No growth after 2 days  Preliminary   10/26/2021 2+ Normal jean marie  Final   10/26/2021 Candida albicans (A)  Preliminary   10/25/2021 1+ Normal jean marie  Final   10/25/2021 2+ Candida albicans (A)  Final     Comment:     Susceptibilities not routinely done   10/25/2021 Candida albicans (A)  Preliminary   10/25/2021 No growth after 6 days  Preliminary   10/25/2021 No Growth  Final   10/25/2021 No Growth  Final   10/25/2021 No Growth  Final   10/25/2021 No Growth  Final   10/23/2021 No Growth  Final    No results found for: SDES     Last check of C difficile  C Difficile Toxin B by PCR   Date Value Ref Range Status   10/27/2021 Negative Negative Final     Comment:     A negative result does not exclude actual disease due to C. difficile and may be due to improper collection, handling and storage of the specimen or the number of organisms in the specimen is below the detection limit of the assay.       Quantiferon testing   Recent Labs   Lab Test 11/01/21  0818 10/31/21  0610 09/16/21  0645 09/07/21  1324   TBRES  --   --   --  Indeterminate*   LYMPH 4 3   < > 2    < > = values in this interval not displayed.     Virology:    Coronavirus-19 testing    Recent Labs   Lab Test 10/24/21  1644 10/17/21  1329 10/15/21  0614 10/08/21  1414 09/15/21  1216 09/07/21  1324 09/06/21  0010 08/30/21  0752 08/02/21  1230 06/20/20  1454   CD19  --   --   --   --   --  <1*  --   --   --   --    ACD19  --   --   --   --   --  1*  --   --   --   --    [id number] Negative Negative Negative Negative   < >  --    < >  --   --   --    COVIDPCREXT  --   --   --   --   --   --   --  Not Detected Not Detected Not Detected    < > = values in this interval not displayed.     Respiratory virus (non-coronavirus-19) testing    Recent Labs   Lab Test 10/17/21  1331   IFLUA Negative   FLUAH1 Negative    UV3097 Negative   FLUAH3 Negative   IFLUB Negative   PIV1 Negative   PIV2 Negative   PIV3 Negative   HRVS Negative   RSVA Negative   RSVB Negative   HMPV Negative   ADVBE Negative   ADVC Negative     CMV viral loads    Recent Labs   Lab Test 10/26/21  1540   CMVQNT Not Detected       Hepatitis B Testing     Recent Labs   Lab Test 10/15/21  0907 09/16/21  2157 09/07/21  1324 09/07/21  1100   AUSAB 0.12  --   --  0.51   HBCAB Nonreactive  --   --  Nonreactive   HEPBANG Nonreactive Nonreactive   < >  --     < > = values in this interval not displayed.     Hepatitis C Antibody   Date Value Ref Range Status   10/15/2021 Nonreactive Nonreactive Final   09/08/2021 Nonreactive Nonreactive Final       CMV Antibody IgG   Date Value Ref Range Status   10/15/2021 No detectable antibody. No detectable antibody.  Final   09/07/2021 No detectable antibody. No detectable antibody.  Final     Varicella Zoster Antibody IgG   Date Value Ref Range Status   09/07/2021 Positive (A) No detectable antibody.  Final     Comment:     Suggests previous exposure or immunization and probable immunity.     EBV Capsid Antibody IgG   Date Value Ref Range Status   10/15/2021 Positive (A) No detectable antibody. Final     Comment:     Suggests recent or past exposure.   09/07/2021 Positive (A) No detectable antibody. Final     Comment:     Suggests recent or past exposure.     Toxoplasma Antibody IgG   Date Value Ref Range Status   09/07/2021 <3.0 0.0 - 7.1 IU/mL Final     Comment:     Negative- Absence of detectable Toxoplasma gondii IgG antibodies. A negative result does not rule out acute infection.     Herpes Simplex Virus Type 1 IgG Antibody   Date Value Ref Range Status   10/15/2021 Positive.  IgG antibody to HSV-1 detected. (A) No HSV-1 IgG antibodies detected Final   09/07/2021 Positive.  IgG antibody to HSV-1 detected. (A) No HSV-1 IgG antibodies detected Final     Herpes Simplex Virus Type 2 IgG Antibody   Date Value Ref Range Status    10/15/2021 Positive.  IgG antibody to HSV-2 detected. (A) No HSV-2 IgG antibodies detected Final   09/07/2021 Positive.  IgG antibody to HSV-2 detected. (A) No HSV-2 IgG antibodies detected Final     Imaging:  Recent Results (from the past 48 hour(s))   XR Chest Port 1 View    Narrative    EXAM: XR CHEST PORT 1 VW 10/31/2021 8:32 AM    HISTORY: Status post lung transplant    COMPARISON: 10/30/2021    FINDINGS: Portable AP view of the chest. Tracheostomy tube projects  over the mid trachea. Surgical changes of the chest with clamshell  sternotomy wires and skin staples. Cardiomediastinal silhouette is  stable. No pneumothorax. Small bilateral pleural effusions. Unchanged  right basilar airspace opacity. Upper abdomen is unremarkable.      Impression    IMPRESSION:  1. Small bilateral pleural effusions with unchanged bibasilar  opacities, likely atelectasis.  2. Stable postsurgical chest.    I have personally reviewed the examination and initial interpretation  and I agree with the findings.    KAMLA SHIN MD         SYSTEM ID:  [id number]   XR Chest Port 1 View    Narrative    Exam: XR CHEST PORT 1 VIEW, 11/1/2021 1:05 AM    Indication: s/p lung transplant    Comparison: 10/31/2021    Findings:   Tracheostomy unchanged from prior. Cardiac silhouette is unchanged  from prior. Postsurgical changes of lung transplantation similar to  prior. Clamshell sternotomy wires. Small bilateral pleural effusions.  Slightly increased hazy retrocardiac airspace opacity. Right basilar  airspace opacities are unchanged.      Impression    Impression:   1. Slightly increased hazy retrocardiac airspace opacity. Hazy right  basilar airspace opacities are unchanged.  2. Small pleural effusions.    I have personally reviewed the examination and initial interpretation  and I agree with the findings.    KAMLA SHIN MD         SYSTEM ID:  [id number]     11/1, 10/31, 10/30 CXRs:  Slightly increased hazy retrocardiac airspace  opacity.  Hazy right basilar airspace opacities are unchanged, likely atelectasis.  Mild pulmonary congestion.  Small bilateral pleural effusions.  Stable postsurgical chest.  Tracheostomy tube now demonstrated. Gastric and feeding tubes removed.   10/28, 10/27 CXRs:  Removal of bilateral chest tubes without pneumothorax.  Small left and trace right pleural effusions and postoperative atelectasis.  Mild pulmonary vascular congestion.   10/26 Brain MRI:  1. Evolving acute age multifocal acute infarctions in both cerebral hemispheres and left cerebellum. No new areas of infarction when compared with prior MRI brain on 10/23/2021.  2. Minimally increased punctate regions of susceptibility effect within areas of infarction, corresponding to petechial hemorrhage within previously identified multifocal acute infarctions.  3. Head MRA demonstrates no definite aneurysm or stenosis of the major intracranial arteries.  4. Neck MRA demonstrates patent major cervical arteries.   10/26 CXR:  Improved left pleural effusion and associated lower lobe opacity.  Stable small right pleural effusion.  No new focal airspace opacities.  Stable support devices.  10/26 Head CT:  This exam is significantly limited by motion and streak artifact.  MRI brain 10/23/2021 demonstrated multiple acute/subacute infarcts throughout the bilateral cerebral hemispheres and left cerebellum. Some of these acute/subacute infarcts demonstrated microhemorrhage on SWI.  Left posterior parietal lobe acute/subacute infarct is identified on current exam. Remaining previously seen infarcts are not identified possibly obscured by artifact. It is possible that a component of the abnormal signal on MRI may of been attributed to posterior reversible encephalopathy syndrome in addition to acute/subacute infarcts.  No CT evidence for a new cortical hypoattenuation to indicate a new acute infarct on current exam.  No definite acute intracranial hemorrhage given  artifact.  No midline shift.  10/25 ABX:  Uncomplicated feeding tube placement with tip in the third portion of the duodenum.  10/25 Head / neck CTA:  HEAD CTA:  1.  No intracranial arterial large vessel occlusion.  2.  No significant stenosis/occlusion.  NECK CTA:  1.  Right vertebral artery mild stenosis.  2.  Left vertebral artery origin severe high-grade stenosis.  3.  Otherwise, no significant stenosis/occlusion. No dissection.  10/25 PM CXR:  1. Interval removal of apically directed chest tubes. No appreciable pneumothorax.  2. Cardiomegaly with persistent bilateral interstitial opacities suggestive for pulmonary edema versus atelectasis.  3. Small left pleural effusion with increased left basilar atelectasis versus consolidation.  4. Additional lines and tubes in stable position.   10/25 Chest / abdm CT:  1. Decreased (versus 10/22/21 CT scan) consolidative opacities in the posterolateral left upper lobe and bilateral lung bases, which may represent improving infection and/or atelectasis.  2. Nonocclusive venous thrombosis of the low right internal jugular vein.  3. Decreased trace right hydropneumothorax and small left pleural effusion.  4. Support devices are in similar position, including the right inferior chest tube tip near the 8th-9th intercostal space, not definitively in the pleural space.  5. Decreased anterior chest subcutaneous/intramuscular emphysema.  6. The remainder of the exam is not significantly changed since 10/22/2021.  10/25 CXR:  1. No significant interval change and stable support devices.  2. Small left pleural effusion. Bibasilar atelectasis. Stable bilateral interstitial and airspace opacities.  3. No pneumothorax.  10/24 CXR:  1.  Stable postsurgical bilateral lung transplant changes with mild  perihilar interstitial opacities suggestive for pulmonary edema versus atelectasis.  2.  Support devices in stable position. No appreciable pneumothorax.   10/23 TTE:  No evidence of  endocarditis. Normal biventricular function.  No valvular disease.  10/23 Brain MRI:  Multifocal subacute infarcts within both cerebral hemispheres and left cerebellum all of which appear present on prior head CT 10/22/2021.  Minimal petechial hemorrhage associated with the infarct in the left occipital lobe.  10/23 ABXs:  Gastric tube tip and sidehole projected over the stomach.  Nonobstructive bowel gas pattern.  10/23 TTE:  Valves cannot be assessed in this echo due to bandages on the chest.   10/23 CXR:  1. Postsurgical changes bilateral lung transplant.  2. Stable support devices.  3. Small left pleural effusion. Bibasilar atelectasis. Stable bilateral interstitial and airspace opacities.  4. No appreciable pneumothorax.  10/22 Chest / abdm CT angiogram:  1. No acute pulmonary embolus.  2. Surgical changes of bilateral lung transplantation are stable compared to exam performed earlier today. Similar appearance of opacities throughout both lungs, likely representing atelectasis and/or consolidation.  3. No acute abnormality in the abdomen or pelvis.     10/22 Chest CT:  1. Postoperative changes of bilateral lung transplantation with supportive devices in stable positioning.  2. Multifocal consolidative opacities involving the left upper, left lower, and right lower lobes with scattered mucous plugging.  Concerning for aspiration or infection.  3. Multifocal diffuse groundglass opacities with mild interlobular septal thickening, compatible with pulmonary edema.  4. Extensive subcutaneous emphysema of the anterior chest wall and right neck.

## 2021-11-02 ENCOUNTER — APPOINTMENT (OUTPATIENT)
Dept: GENERAL RADIOLOGY | Facility: CLINIC | Age: 56
End: 2021-11-02
Attending: STUDENT IN AN ORGANIZED HEALTH CARE EDUCATION/TRAINING PROGRAM
Payer: COMMERCIAL

## 2021-11-02 ENCOUNTER — APPOINTMENT (OUTPATIENT)
Dept: CT IMAGING | Facility: CLINIC | Age: 56
End: 2021-11-02
Attending: NURSE PRACTITIONER
Payer: COMMERCIAL

## 2021-11-02 ENCOUNTER — APPOINTMENT (OUTPATIENT)
Dept: CARDIOLOGY | Facility: CLINIC | Age: 56
End: 2021-11-02
Attending: STUDENT IN AN ORGANIZED HEALTH CARE EDUCATION/TRAINING PROGRAM
Payer: COMMERCIAL

## 2021-11-02 LAB
ABO/RH(D): NORMAL
ACANTHOCYTES BLD QL SMEAR: SLIGHT
ACANTHOCYTES BLD QL SMEAR: SLIGHT
ALBUMIN SERPL-MCNC: 1.1 G/DL (ref 3.4–5)
ALBUMIN SERPL-MCNC: 1.4 G/DL (ref 3.4–5)
ALBUMIN SERPL-MCNC: 1.5 G/DL (ref 3.4–5)
ALBUMIN SERPL-MCNC: 1.6 G/DL (ref 3.4–5)
ALP SERPL-CCNC: 110 U/L (ref 40–150)
ALP SERPL-CCNC: 139 U/L (ref 40–150)
ALP SERPL-CCNC: 150 U/L (ref 40–150)
ALP SERPL-CCNC: 182 U/L (ref 40–150)
ALT SERPL W P-5'-P-CCNC: 392 U/L (ref 0–70)
ALT SERPL W P-5'-P-CCNC: 50 U/L (ref 0–70)
ALT SERPL W P-5'-P-CCNC: 72 U/L (ref 0–70)
ALT SERPL W P-5'-P-CCNC: 72 U/L (ref 0–70)
ANION GAP SERPL CALCULATED.3IONS-SCNC: 12 MMOL/L (ref 3–14)
ANION GAP SERPL CALCULATED.3IONS-SCNC: 14 MMOL/L (ref 3–14)
ANION GAP SERPL CALCULATED.3IONS-SCNC: 3 MMOL/L (ref 3–14)
ANION GAP SERPL CALCULATED.3IONS-SCNC: 3 MMOL/L (ref 3–14)
ANION GAP SERPL CALCULATED.3IONS-SCNC: 4 MMOL/L (ref 3–14)
ANION GAP SERPL CALCULATED.3IONS-SCNC: 5 MMOL/L (ref 3–14)
ANION GAP SERPL CALCULATED.3IONS-SCNC: 7 MMOL/L (ref 3–14)
ANION GAP SERPL CALCULATED.3IONS-SCNC: 9 MMOL/L (ref 3–14)
ANTIBODY SCREEN: NEGATIVE
APTT PPP: 32 SECONDS (ref 22–38)
APTT PPP: 34 SECONDS (ref 22–38)
APTT PPP: 50 SECONDS (ref 22–38)
AST SERPL W P-5'-P-CCNC: 33 U/L (ref 0–45)
AST SERPL W P-5'-P-CCNC: 39 U/L (ref 0–45)
AST SERPL W P-5'-P-CCNC: 450 U/L (ref 0–45)
AST SERPL W P-5'-P-CCNC: 68 U/L (ref 0–45)
ATRIAL RATE - MUSE: 121 BPM
BASE EXCESS BLDA CALC-SCNC: -0.2 MMOL/L (ref -9–1.8)
BASE EXCESS BLDA CALC-SCNC: -10.7 MMOL/L (ref -9–1.8)
BASE EXCESS BLDA CALC-SCNC: 6.4 MMOL/L (ref -9–1.8)
BASE EXCESS BLDA CALC-SCNC: 7.4 MMOL/L (ref -9–1.8)
BASE EXCESS BLDA CALC-SCNC: 7.9 MMOL/L (ref -9–1.8)
BASE EXCESS BLDA CALC-SCNC: 8.9 MMOL/L (ref -9–1.8)
BASE EXCESS BLDV CALC-SCNC: -5.4 MMOL/L (ref -7.7–1.9)
BASOPHILS # BLD MANUAL: 0 10E3/UL (ref 0–0.2)
BASOPHILS # BLD MANUAL: 0.4 10E3/UL (ref 0–0.2)
BASOPHILS # BLD MANUAL: 0.7 10E3/UL (ref 0–0.2)
BASOPHILS NFR BLD MANUAL: 0 %
BASOPHILS NFR BLD MANUAL: 1 %
BASOPHILS NFR BLD MANUAL: 2 %
BILIRUB SERPL-MCNC: 0.2 MG/DL (ref 0.2–1.3)
BILIRUB SERPL-MCNC: 0.2 MG/DL (ref 0.2–1.3)
BILIRUB SERPL-MCNC: 0.3 MG/DL (ref 0.2–1.3)
BILIRUB SERPL-MCNC: 0.6 MG/DL (ref 0.2–1.3)
BLD PROD TYP BPU: NORMAL
BLOOD COMPONENT TYPE: NORMAL
BUN SERPL-MCNC: 101 MG/DL (ref 7–30)
BUN SERPL-MCNC: 105 MG/DL (ref 7–30)
BUN SERPL-MCNC: 82 MG/DL (ref 7–30)
BUN SERPL-MCNC: 93 MG/DL (ref 7–30)
BUN SERPL-MCNC: 93 MG/DL (ref 7–30)
BUN SERPL-MCNC: 95 MG/DL (ref 7–30)
BUN SERPL-MCNC: 97 MG/DL (ref 7–30)
BUN SERPL-MCNC: 98 MG/DL (ref 7–30)
CA-I BLD-MCNC: 6.2 MG/DL (ref 4.4–5.2)
CALCIUM SERPL-MCNC: 10 MG/DL (ref 8.5–10.1)
CALCIUM SERPL-MCNC: 10 MG/DL (ref 8.5–10.1)
CALCIUM SERPL-MCNC: 7.2 MG/DL (ref 8.5–10.1)
CALCIUM SERPL-MCNC: 8.8 MG/DL (ref 8.5–10.1)
CALCIUM SERPL-MCNC: 9.2 MG/DL (ref 8.5–10.1)
CALCIUM SERPL-MCNC: 9.2 MG/DL (ref 8.5–10.1)
CALCIUM SERPL-MCNC: 9.3 MG/DL (ref 8.5–10.1)
CALCIUM SERPL-MCNC: 9.4 MG/DL (ref 8.5–10.1)
CALCIUM SERPL-MCNC: 9.4 MG/DL (ref 8.5–10.1)
CALCIUM SERPL-MCNC: 9.7 MG/DL (ref 8.5–10.1)
CHLORIDE BLD-SCNC: 112 MMOL/L (ref 94–109)
CHLORIDE BLD-SCNC: 112 MMOL/L (ref 94–109)
CHLORIDE BLD-SCNC: 113 MMOL/L (ref 94–109)
CHLORIDE BLD-SCNC: 115 MMOL/L (ref 94–109)
CHLORIDE BLD-SCNC: 116 MMOL/L (ref 94–109)
CHLORIDE BLD-SCNC: 116 MMOL/L (ref 94–109)
CHLORIDE BLD-SCNC: 117 MMOL/L (ref 94–109)
CHLORIDE BLD-SCNC: 117 MMOL/L (ref 94–109)
CHLORIDE BLD-SCNC: 118 MMOL/L (ref 94–109)
CHLORIDE BLD-SCNC: 119 MMOL/L (ref 94–109)
CO2 SERPL-SCNC: 18 MMOL/L (ref 20–32)
CO2 SERPL-SCNC: 22 MMOL/L (ref 20–32)
CO2 SERPL-SCNC: 26 MMOL/L (ref 20–32)
CO2 SERPL-SCNC: 27 MMOL/L (ref 20–32)
CO2 SERPL-SCNC: 29 MMOL/L (ref 20–32)
CO2 SERPL-SCNC: 31 MMOL/L (ref 20–32)
CO2 SERPL-SCNC: 32 MMOL/L (ref 20–32)
CODING SYSTEM: NORMAL
CREAT SERPL-MCNC: 1.09 MG/DL (ref 0.66–1.25)
CREAT SERPL-MCNC: 1.17 MG/DL (ref 0.66–1.25)
CREAT SERPL-MCNC: 1.22 MG/DL (ref 0.66–1.25)
CREAT SERPL-MCNC: 1.23 MG/DL (ref 0.66–1.25)
CREAT SERPL-MCNC: 1.3 MG/DL (ref 0.66–1.25)
CREAT SERPL-MCNC: 1.36 MG/DL (ref 0.66–1.25)
CREAT SERPL-MCNC: 1.38 MG/DL (ref 0.66–1.25)
CREAT SERPL-MCNC: 1.47 MG/DL (ref 0.66–1.25)
CROSSMATCH: NORMAL
DIASTOLIC BLOOD PRESSURE - MUSE: NORMAL MMHG
EOSINOPHIL # BLD MANUAL: 0 10E3/UL (ref 0–0.7)
EOSINOPHIL NFR BLD MANUAL: 0 %
ERYTHROCYTE [DISTWIDTH] IN BLOOD BY AUTOMATED COUNT: 17.7 % (ref 10–15)
ERYTHROCYTE [DISTWIDTH] IN BLOOD BY AUTOMATED COUNT: 17.9 % (ref 10–15)
ERYTHROCYTE [DISTWIDTH] IN BLOOD BY AUTOMATED COUNT: 18 % (ref 10–15)
ERYTHROCYTE [DISTWIDTH] IN BLOOD BY AUTOMATED COUNT: 18.1 % (ref 10–15)
ERYTHROCYTE [DISTWIDTH] IN BLOOD BY AUTOMATED COUNT: 18.4 % (ref 10–15)
ERYTHROCYTE [DISTWIDTH] IN BLOOD BY AUTOMATED COUNT: 18.7 % (ref 10–15)
ERYTHROCYTE [DISTWIDTH] IN BLOOD BY AUTOMATED COUNT: 19.6 % (ref 10–15)
ERYTHROCYTE [DISTWIDTH] IN BLOOD BY AUTOMATED COUNT: 19.9 % (ref 10–15)
FIBRINOGEN PPP-MCNC: 390 MG/DL (ref 170–490)
FIBRINOGEN PPP-MCNC: 415 MG/DL (ref 170–490)
FIBRINOGEN PPP-MCNC: 534 MG/DL (ref 170–490)
GFR SERPL CREATININE-BSD FRML MDRD: 53 ML/MIN/1.73M2
GFR SERPL CREATININE-BSD FRML MDRD: 57 ML/MIN/1.73M2
GFR SERPL CREATININE-BSD FRML MDRD: 58 ML/MIN/1.73M2
GFR SERPL CREATININE-BSD FRML MDRD: 61 ML/MIN/1.73M2
GFR SERPL CREATININE-BSD FRML MDRD: 65 ML/MIN/1.73M2
GFR SERPL CREATININE-BSD FRML MDRD: 66 ML/MIN/1.73M2
GFR SERPL CREATININE-BSD FRML MDRD: 69 ML/MIN/1.73M2
GFR SERPL CREATININE-BSD FRML MDRD: 75 ML/MIN/1.73M2
GLUCOSE BLD-MCNC: 108 MG/DL (ref 70–99)
GLUCOSE BLD-MCNC: 121 MG/DL (ref 70–99)
GLUCOSE BLD-MCNC: 140 MG/DL (ref 70–99)
GLUCOSE BLD-MCNC: 152 MG/DL (ref 70–99)
GLUCOSE BLD-MCNC: 167 MG/DL (ref 70–99)
GLUCOSE BLD-MCNC: 240 MG/DL (ref 70–99)
GLUCOSE BLD-MCNC: 300 MG/DL (ref 70–99)
GLUCOSE BLD-MCNC: 459 MG/DL (ref 70–99)
GLUCOSE BLD-MCNC: 56 MG/DL (ref 70–99)
GLUCOSE BLD-MCNC: 56 MG/DL (ref 70–99)
GLUCOSE BLDC GLUCOMTR-MCNC: 100 MG/DL (ref 70–99)
GLUCOSE BLDC GLUCOMTR-MCNC: 111 MG/DL (ref 70–99)
GLUCOSE BLDC GLUCOMTR-MCNC: 115 MG/DL (ref 70–99)
GLUCOSE BLDC GLUCOMTR-MCNC: 119 MG/DL (ref 70–99)
GLUCOSE BLDC GLUCOMTR-MCNC: 123 MG/DL (ref 70–99)
GLUCOSE BLDC GLUCOMTR-MCNC: 130 MG/DL (ref 70–99)
GLUCOSE BLDC GLUCOMTR-MCNC: 139 MG/DL (ref 70–99)
GLUCOSE BLDC GLUCOMTR-MCNC: 157 MG/DL (ref 70–99)
GLUCOSE BLDC GLUCOMTR-MCNC: 163 MG/DL (ref 70–99)
GLUCOSE BLDC GLUCOMTR-MCNC: 165 MG/DL (ref 70–99)
GLUCOSE BLDC GLUCOMTR-MCNC: 173 MG/DL (ref 70–99)
GLUCOSE BLDC GLUCOMTR-MCNC: 177 MG/DL (ref 70–99)
GLUCOSE BLDC GLUCOMTR-MCNC: 199 MG/DL (ref 70–99)
GLUCOSE BLDC GLUCOMTR-MCNC: 225 MG/DL (ref 70–99)
GLUCOSE BLDC GLUCOMTR-MCNC: 229 MG/DL (ref 70–99)
GLUCOSE BLDC GLUCOMTR-MCNC: 275 MG/DL (ref 70–99)
GLUCOSE BLDC GLUCOMTR-MCNC: 419 MG/DL (ref 70–99)
GLUCOSE BLDC GLUCOMTR-MCNC: 53 MG/DL (ref 70–99)
GLUCOSE BLDC GLUCOMTR-MCNC: 98 MG/DL (ref 70–99)
HCO3 BLD-SCNC: 16 MMOL/L (ref 21–28)
HCO3 BLD-SCNC: 26 MMOL/L (ref 21–28)
HCO3 BLD-SCNC: 31 MMOL/L (ref 21–28)
HCO3 BLD-SCNC: 31 MMOL/L (ref 21–28)
HCO3 BLD-SCNC: 32 MMOL/L (ref 21–28)
HCO3 BLD-SCNC: 33 MMOL/L (ref 21–28)
HCO3 BLDV-SCNC: 22 MMOL/L (ref 21–28)
HCT VFR BLD AUTO: 19.6 % (ref 40–53)
HCT VFR BLD AUTO: 19.6 % (ref 40–53)
HCT VFR BLD AUTO: 22.3 % (ref 40–53)
HCT VFR BLD AUTO: 23.8 % (ref 40–53)
HCT VFR BLD AUTO: 26.8 % (ref 40–53)
HCT VFR BLD AUTO: 27.3 % (ref 40–53)
HCT VFR BLD AUTO: 30.1 % (ref 40–53)
HCT VFR BLD AUTO: 31.3 % (ref 40–53)
HGB BLD-MCNC: 10.1 G/DL (ref 13.3–17.7)
HGB BLD-MCNC: 10.1 G/DL (ref 13.3–17.7)
HGB BLD-MCNC: 10.5 G/DL (ref 13.3–17.7)
HGB BLD-MCNC: 5.8 G/DL (ref 13.3–17.7)
HGB BLD-MCNC: 6 G/DL (ref 13.3–17.7)
HGB BLD-MCNC: 6.5 G/DL (ref 13.3–17.7)
HGB BLD-MCNC: 7.7 G/DL (ref 13.3–17.7)
HGB BLD-MCNC: 8.2 G/DL (ref 13.3–17.7)
HGB BLD-MCNC: 8.8 G/DL (ref 13.3–17.7)
HGB BLD-MCNC: 9.2 G/DL (ref 13.3–17.7)
INR PPP: 1.18 (ref 0.85–1.15)
INR PPP: 1.38 (ref 0.85–1.15)
INR PPP: 1.55 (ref 0.85–1.15)
INR PPP: 1.56 (ref 0.85–1.15)
INTERPRETATION ECG - MUSE: NORMAL
ISSUE DATE AND TIME: NORMAL
LACTATE SERPL-SCNC: 1 MMOL/L (ref 0.7–2)
LACTATE SERPL-SCNC: 1.2 MMOL/L (ref 0.7–2)
LACTATE SERPL-SCNC: 1.7 MMOL/L (ref 0.7–2)
LACTATE SERPL-SCNC: 11.4 MMOL/L (ref 0.7–2)
LACTATE SERPL-SCNC: 3.5 MMOL/L (ref 0.7–2)
LACTATE SERPL-SCNC: 5.8 MMOL/L (ref 0.7–2)
LACTATE SERPL-SCNC: 6.5 MMOL/L (ref 0.7–2)
LACTATE SERPL-SCNC: 7.3 MMOL/L (ref 0.7–2)
LYMPHOCYTES # BLD MANUAL: 1.3 10E3/UL (ref 0.8–5.3)
LYMPHOCYTES # BLD MANUAL: 4 10E3/UL (ref 0.8–5.3)
LYMPHOCYTES # BLD MANUAL: 4.9 10E3/UL (ref 0.8–5.3)
LYMPHOCYTES NFR BLD MANUAL: 13 %
LYMPHOCYTES NFR BLD MANUAL: 4 %
LYMPHOCYTES NFR BLD MANUAL: 7 %
MAGNESIUM SERPL-MCNC: 1.9 MG/DL (ref 1.6–2.3)
MAGNESIUM SERPL-MCNC: 2.2 MG/DL (ref 1.6–2.3)
MCH RBC QN AUTO: 30.4 PG (ref 26.5–33)
MCH RBC QN AUTO: 30.5 PG (ref 26.5–33)
MCH RBC QN AUTO: 30.6 PG (ref 26.5–33)
MCH RBC QN AUTO: 30.7 PG (ref 26.5–33)
MCH RBC QN AUTO: 31.1 PG (ref 26.5–33)
MCH RBC QN AUTO: 31.1 PG (ref 26.5–33)
MCH RBC QN AUTO: 31.7 PG (ref 26.5–33)
MCH RBC QN AUTO: 31.7 PG (ref 26.5–33)
MCHC RBC AUTO-ENTMCNC: 29.1 G/DL (ref 31.5–36.5)
MCHC RBC AUTO-ENTMCNC: 29.6 G/DL (ref 31.5–36.5)
MCHC RBC AUTO-ENTMCNC: 30 G/DL (ref 31.5–36.5)
MCHC RBC AUTO-ENTMCNC: 30.6 G/DL (ref 31.5–36.5)
MCHC RBC AUTO-ENTMCNC: 32.4 G/DL (ref 31.5–36.5)
MCHC RBC AUTO-ENTMCNC: 32.8 G/DL (ref 31.5–36.5)
MCHC RBC AUTO-ENTMCNC: 33.5 G/DL (ref 31.5–36.5)
MCHC RBC AUTO-ENTMCNC: 33.6 G/DL (ref 31.5–36.5)
MCV RBC AUTO: 100 FL (ref 78–100)
MCV RBC AUTO: 103 FL (ref 78–100)
MCV RBC AUTO: 107 FL (ref 78–100)
MCV RBC AUTO: 107 FL (ref 78–100)
MCV RBC AUTO: 91 FL (ref 78–100)
MCV RBC AUTO: 91 FL (ref 78–100)
MCV RBC AUTO: 93 FL (ref 78–100)
MCV RBC AUTO: 98 FL (ref 78–100)
METAMYELOCYTES # BLD MANUAL: 0.6 10E3/UL
METAMYELOCYTES # BLD MANUAL: 1.1 10E3/UL
METAMYELOCYTES NFR BLD MANUAL: 1 %
METAMYELOCYTES NFR BLD MANUAL: 3 %
MONOCYTES # BLD MANUAL: 0.7 10E3/UL (ref 0–1.3)
MONOCYTES # BLD MANUAL: 1.7 10E3/UL (ref 0–1.3)
MONOCYTES # BLD MANUAL: 2.3 10E3/UL (ref 0–1.3)
MONOCYTES NFR BLD MANUAL: 2 %
MONOCYTES NFR BLD MANUAL: 3 %
MONOCYTES NFR BLD MANUAL: 6 %
MYELOCYTES # BLD MANUAL: 0.4 10E3/UL
MYELOCYTES # BLD MANUAL: 1.1 10E3/UL
MYELOCYTES NFR BLD MANUAL: 1 %
MYELOCYTES NFR BLD MANUAL: 2 %
NEUTROPHILS # BLD MANUAL: 28.5 10E3/UL (ref 1.6–8.3)
NEUTROPHILS # BLD MANUAL: 29.7 10E3/UL (ref 1.6–8.3)
NEUTROPHILS # BLD MANUAL: 49.9 10E3/UL (ref 1.6–8.3)
NEUTROPHILS NFR BLD MANUAL: 76 %
NEUTROPHILS NFR BLD MANUAL: 87 %
NEUTROPHILS NFR BLD MANUAL: 91 %
NRBC # BLD AUTO: 0.3 10E3/UL
NRBC # BLD AUTO: 1.5 10E3/UL
NRBC # BLD AUTO: 4 10E3/UL
NRBC BLD MANUAL-RTO: 1 %
NRBC BLD MANUAL-RTO: 4 %
NRBC BLD MANUAL-RTO: 7 %
O2/TOTAL GAS SETTING VFR VENT: 100 %
O2/TOTAL GAS SETTING VFR VENT: 70 %
O2/TOTAL GAS SETTING VFR VENT: 80 %
OXYHGB MFR BLD: 99 % (ref 92–100)
OXYHGB MFR BLDV: 29 % (ref 70–75)
P AXIS - MUSE: 51 DEGREES
PCO2 BLD: 39 MM HG (ref 35–45)
PCO2 BLD: 40 MM HG (ref 35–45)
PCO2 BLD: 40 MM HG (ref 35–45)
PCO2 BLD: 42 MM HG (ref 35–45)
PCO2 BLD: 43 MM HG (ref 35–45)
PCO2 BLD: 49 MM HG (ref 35–45)
PCO2 BLDV: 53 MM HG (ref 40–50)
PH BLD: 7.22 [PH] (ref 7.35–7.45)
PH BLD: 7.33 [PH] (ref 7.35–7.45)
PH BLD: 7.47 [PH] (ref 7.35–7.45)
PH BLD: 7.49 [PH] (ref 7.35–7.45)
PH BLD: 7.5 [PH] (ref 7.35–7.45)
PH BLD: 7.51 [PH] (ref 7.35–7.45)
PH BLDV: 7.23 [PH] (ref 7.32–7.43)
PHOSPHATE SERPL-MCNC: 3.1 MG/DL (ref 2.5–4.5)
PHOSPHATE SERPL-MCNC: 7 MG/DL (ref 2.5–4.5)
PLAT MORPH BLD: ABNORMAL
PLATELET # BLD AUTO: 131 10E3/UL (ref 150–450)
PLATELET # BLD AUTO: 151 10E3/UL (ref 150–450)
PLATELET # BLD AUTO: 232 10E3/UL (ref 150–450)
PLATELET # BLD AUTO: 244 10E3/UL (ref 150–450)
PLATELET # BLD AUTO: 250 10E3/UL (ref 150–450)
PLATELET # BLD AUTO: 355 10E3/UL (ref 150–450)
PLATELET # BLD AUTO: 382 10E3/UL (ref 150–450)
PLATELET # BLD AUTO: 446 10E3/UL (ref 150–450)
PO2 BLD: 105 MM HG (ref 80–105)
PO2 BLD: 142 MM HG (ref 80–105)
PO2 BLD: 196 MM HG (ref 80–105)
PO2 BLD: 214 MM HG (ref 80–105)
PO2 BLD: 66 MM HG (ref 80–105)
PO2 BLD: 91 MM HG (ref 80–105)
PO2 BLDV: 25 MM HG (ref 25–47)
POLYCHROMASIA BLD QL SMEAR: ABNORMAL
POLYCHROMASIA BLD QL SMEAR: SLIGHT
POTASSIUM BLD-SCNC: 4.3 MMOL/L (ref 3.4–5.3)
POTASSIUM BLD-SCNC: 4.3 MMOL/L (ref 3.4–5.3)
POTASSIUM BLD-SCNC: 4.4 MMOL/L (ref 3.4–5.3)
POTASSIUM BLD-SCNC: 4.6 MMOL/L (ref 3.4–5.3)
POTASSIUM BLD-SCNC: 4.7 MMOL/L (ref 3.4–5.3)
POTASSIUM BLD-SCNC: 4.8 MMOL/L (ref 3.4–5.3)
POTASSIUM BLD-SCNC: 5.1 MMOL/L (ref 3.4–5.3)
POTASSIUM BLD-SCNC: 5.4 MMOL/L (ref 3.4–5.3)
POTASSIUM BLD-SCNC: 5.5 MMOL/L (ref 3.4–5.3)
POTASSIUM BLD-SCNC: 5.5 MMOL/L (ref 3.4–5.3)
POTASSIUM BLD-SCNC: 6.6 MMOL/L (ref 3.4–5.3)
POTASSIUM BLD-SCNC: 7.2 MMOL/L (ref 3.4–5.3)
PR INTERVAL - MUSE: 118 MS
PROMYELOCYTES # BLD MANUAL: 0.3 10E3/UL
PROMYELOCYTES NFR BLD MANUAL: 1 %
PROT SERPL-MCNC: 3.8 G/DL (ref 6.8–8.8)
PROT SERPL-MCNC: 4.4 G/DL (ref 6.8–8.8)
PROT SERPL-MCNC: 4.5 G/DL (ref 6.8–8.8)
PROT SERPL-MCNC: 5.3 G/DL (ref 6.8–8.8)
QRS DURATION - MUSE: 72 MS
QT - MUSE: 334 MS
QTC - MUSE: 474 MS
R AXIS - MUSE: 58 DEGREES
RBC # BLD AUTO: 1.83 10E6/UL (ref 4.4–5.9)
RBC # BLD AUTO: 1.96 10E6/UL (ref 4.4–5.9)
RBC # BLD AUTO: 2.09 10E6/UL (ref 4.4–5.9)
RBC # BLD AUTO: 2.43 10E6/UL (ref 4.4–5.9)
RBC # BLD AUTO: 2.64 10E6/UL (ref 4.4–5.9)
RBC # BLD AUTO: 2.87 10E6/UL (ref 4.4–5.9)
RBC # BLD AUTO: 3.32 10E6/UL (ref 4.4–5.9)
RBC # BLD AUTO: 3.44 10E6/UL (ref 4.4–5.9)
RBC MORPH BLD: ABNORMAL
SODIUM SERPL-SCNC: 144 MMOL/L (ref 133–144)
SODIUM SERPL-SCNC: 146 MMOL/L (ref 133–144)
SODIUM SERPL-SCNC: 148 MMOL/L (ref 133–144)
SODIUM SERPL-SCNC: 148 MMOL/L (ref 133–144)
SODIUM SERPL-SCNC: 150 MMOL/L (ref 133–144)
SODIUM SERPL-SCNC: 152 MMOL/L (ref 133–144)
SODIUM SERPL-SCNC: 153 MMOL/L (ref 133–144)
SODIUM SERPL-SCNC: 154 MMOL/L (ref 133–144)
SPECIMEN EXPIRATION DATE: NORMAL
SYSTOLIC BLOOD PRESSURE - MUSE: NORMAL MMHG
T AXIS - MUSE: -9 DEGREES
TACROLIMUS BLD-MCNC: 11.2 UG/L (ref 5–15)
TME LAST DOSE: NORMAL H
TME LAST DOSE: NORMAL H
UFH PPP CHRO-ACNC: 0.28 IU/ML
UNIT ABO/RH: NORMAL
UNIT NUMBER: NORMAL
UNIT STATUS: NORMAL
UNIT TYPE ISBT: 6200
UNIT TYPE ISBT: 8400
UNIT TYPE ISBT: 8400
UNIT TYPE ISBT: 9500
UPPER GI ENDOSCOPY: NORMAL
VENTRICULAR RATE- MUSE: 121 BPM
WBC # BLD AUTO: 32.6 10E3/UL (ref 4–11)
WBC # BLD AUTO: 36.4 10E3/UL (ref 4–11)
WBC # BLD AUTO: 37.5 10E3/UL (ref 4–11)
WBC # BLD AUTO: 38.1 10E3/UL (ref 4–11)
WBC # BLD AUTO: 40.1 10E3/UL (ref 4–11)
WBC # BLD AUTO: 48 10E3/UL (ref 4–11)
WBC # BLD AUTO: 49.7 10E3/UL (ref 4–11)
WBC # BLD AUTO: 57.4 10E3/UL (ref 4–11)
WBC # BLD AUTO: 57.4 10E3/UL (ref 4–11)

## 2021-11-02 PROCEDURE — 250N000013 HC RX MED GY IP 250 OP 250 PS 637: Performed by: STUDENT IN AN ORGANIZED HEALTH CARE EDUCATION/TRAINING PROGRAM

## 2021-11-02 PROCEDURE — 82803 BLOOD GASES ANY COMBINATION: CPT | Performed by: INTERNAL MEDICINE

## 2021-11-02 PROCEDURE — 85610 PROTHROMBIN TIME: CPT | Performed by: NURSE PRACTITIONER

## 2021-11-02 PROCEDURE — 250N000011 HC RX IP 250 OP 636: Performed by: NURSE PRACTITIONER

## 2021-11-02 PROCEDURE — 99207 PR NON-BILLABLE SERV PER CHARTING: CPT | Performed by: PHYSICIAN ASSISTANT

## 2021-11-02 PROCEDURE — 85730 THROMBOPLASTIN TIME PARTIAL: CPT | Performed by: NURSE PRACTITIONER

## 2021-11-02 PROCEDURE — 71045 X-RAY EXAM CHEST 1 VIEW: CPT | Mod: 77

## 2021-11-02 PROCEDURE — 99291 CRITICAL CARE FIRST HOUR: CPT | Mod: 25 | Performed by: ANESTHESIOLOGY

## 2021-11-02 PROCEDURE — 71045 X-RAY EXAM CHEST 1 VIEW: CPT | Mod: 26 | Performed by: RADIOLOGY

## 2021-11-02 PROCEDURE — 80197 ASSAY OF TACROLIMUS: CPT | Performed by: STUDENT IN AN ORGANIZED HEALTH CARE EDUCATION/TRAINING PROGRAM

## 2021-11-02 PROCEDURE — 85730 THROMBOPLASTIN TIME PARTIAL: CPT | Performed by: ANESTHESIOLOGY

## 2021-11-02 PROCEDURE — 250N000012 HC RX MED GY IP 250 OP 636 PS 637: Performed by: STUDENT IN AN ORGANIZED HEALTH CARE EDUCATION/TRAINING PROGRAM

## 2021-11-02 PROCEDURE — 86923 COMPATIBILITY TEST ELECTRIC: CPT | Performed by: NURSE PRACTITIONER

## 2021-11-02 PROCEDURE — P9037 PLATE PHERES LEUKOREDU IRRAD: HCPCS | Performed by: INTERNAL MEDICINE

## 2021-11-02 PROCEDURE — 80048 BASIC METABOLIC PNL TOTAL CA: CPT | Performed by: ANESTHESIOLOGY

## 2021-11-02 PROCEDURE — 83605 ASSAY OF LACTIC ACID: CPT | Performed by: ANESTHESIOLOGY

## 2021-11-02 PROCEDURE — 0W3P8ZZ CONTROL BLEEDING IN GASTROINTESTINAL TRACT, VIA NATURAL OR ARTIFICIAL OPENING ENDOSCOPIC: ICD-10-PCS | Performed by: INTERNAL MEDICINE

## 2021-11-02 PROCEDURE — 250N000011 HC RX IP 250 OP 636

## 2021-11-02 PROCEDURE — 84100 ASSAY OF PHOSPHORUS: CPT

## 2021-11-02 PROCEDURE — 84100 ASSAY OF PHOSPHORUS: CPT | Performed by: STUDENT IN AN ORGANIZED HEALTH CARE EDUCATION/TRAINING PROGRAM

## 2021-11-02 PROCEDURE — 200N000002 HC R&B ICU UMMC

## 2021-11-02 PROCEDURE — 93321 DOPPLER ECHO F-UP/LMTD STD: CPT | Mod: 26 | Performed by: INTERNAL MEDICINE

## 2021-11-02 PROCEDURE — 71260 CT THORAX DX C+: CPT | Mod: 26 | Performed by: RADIOLOGY

## 2021-11-02 PROCEDURE — P9016 RBC LEUKOCYTES REDUCED: HCPCS | Performed by: NURSE PRACTITIONER

## 2021-11-02 PROCEDURE — 94003 VENT MGMT INPAT SUBQ DAY: CPT

## 2021-11-02 PROCEDURE — 94640 AIRWAY INHALATION TREATMENT: CPT | Mod: 76

## 2021-11-02 PROCEDURE — 83735 ASSAY OF MAGNESIUM: CPT | Performed by: STUDENT IN AN ORGANIZED HEALTH CARE EDUCATION/TRAINING PROGRAM

## 2021-11-02 PROCEDURE — 04HY32Z INSERTION OF MONITORING DEVICE INTO LOWER ARTERY, PERCUTANEOUS APPROACH: ICD-10-PCS | Performed by: ANESTHESIOLOGY

## 2021-11-02 PROCEDURE — 250N000013 HC RX MED GY IP 250 OP 250 PS 637: Performed by: NURSE PRACTITIONER

## 2021-11-02 PROCEDURE — 93005 ELECTROCARDIOGRAM TRACING: CPT

## 2021-11-02 PROCEDURE — 80048 BASIC METABOLIC PNL TOTAL CA: CPT

## 2021-11-02 PROCEDURE — 74177 CT ABD & PELVIS W/CONTRAST: CPT | Mod: 26 | Performed by: RADIOLOGY

## 2021-11-02 PROCEDURE — 36620 INSERTION CATHETER ARTERY: CPT | Performed by: INTERNAL MEDICINE

## 2021-11-02 PROCEDURE — 82330 ASSAY OF CALCIUM: CPT

## 2021-11-02 PROCEDURE — 93010 ELECTROCARDIOGRAM REPORT: CPT | Performed by: INTERNAL MEDICINE

## 2021-11-02 PROCEDURE — 43255 EGD CONTROL BLEEDING ANY: CPT | Performed by: INTERNAL MEDICINE

## 2021-11-02 PROCEDURE — 250N000009 HC RX 250: Performed by: STUDENT IN AN ORGANIZED HEALTH CARE EDUCATION/TRAINING PROGRAM

## 2021-11-02 PROCEDURE — 74018 RADEX ABDOMEN 1 VIEW: CPT

## 2021-11-02 PROCEDURE — P9059 PLASMA, FRZ BETWEEN 8-24HOUR: HCPCS | Performed by: INTERNAL MEDICINE

## 2021-11-02 PROCEDURE — 36556 INSERT NON-TUNNEL CV CATH: CPT | Mod: GC | Performed by: ANESTHESIOLOGY

## 2021-11-02 PROCEDURE — 93308 TTE F-UP OR LMTD: CPT | Mod: 26 | Performed by: INTERNAL MEDICINE

## 2021-11-02 PROCEDURE — 250N000013 HC RX MED GY IP 250 OP 250 PS 637

## 2021-11-02 PROCEDURE — 85520 HEPARIN ASSAY: CPT | Performed by: THORACIC SURGERY (CARDIOTHORACIC VASCULAR SURGERY)

## 2021-11-02 PROCEDURE — P9059 PLASMA, FRZ BETWEEN 8-24HOUR: HCPCS

## 2021-11-02 PROCEDURE — P9037 PLATE PHERES LEUKOREDU IRRAD: HCPCS

## 2021-11-02 PROCEDURE — 87040 BLOOD CULTURE FOR BACTERIA: CPT | Performed by: PHYSICIAN ASSISTANT

## 2021-11-02 PROCEDURE — 250N000012 HC RX MED GY IP 250 OP 636 PS 637: Performed by: NURSE PRACTITIONER

## 2021-11-02 PROCEDURE — 86900 BLOOD TYPING SEROLOGIC ABO: CPT

## 2021-11-02 PROCEDURE — 82803 BLOOD GASES ANY COMBINATION: CPT | Performed by: ANESTHESIOLOGY

## 2021-11-02 PROCEDURE — 999N000185 HC STATISTIC TRANSPORT TIME EA 15 MIN

## 2021-11-02 PROCEDURE — 85018 HEMOGLOBIN: CPT | Performed by: STUDENT IN AN ORGANIZED HEALTH CARE EDUCATION/TRAINING PROGRAM

## 2021-11-02 PROCEDURE — 82805 BLOOD GASES W/O2 SATURATION: CPT | Performed by: THORACIC SURGERY (CARDIOTHORACIC VASCULAR SURGERY)

## 2021-11-02 PROCEDURE — 70450 CT HEAD/BRAIN W/O DYE: CPT | Mod: 26 | Performed by: STUDENT IN AN ORGANIZED HEALTH CARE EDUCATION/TRAINING PROGRAM

## 2021-11-02 PROCEDURE — 258N000001 HC RX 258: Performed by: NURSE PRACTITIONER

## 2021-11-02 PROCEDURE — 999N000157 HC STATISTIC RCP TIME EA 10 MIN

## 2021-11-02 PROCEDURE — 94640 AIRWAY INHALATION TREATMENT: CPT

## 2021-11-02 PROCEDURE — 83735 ASSAY OF MAGNESIUM: CPT

## 2021-11-02 PROCEDURE — 85610 PROTHROMBIN TIME: CPT | Performed by: STUDENT IN AN ORGANIZED HEALTH CARE EDUCATION/TRAINING PROGRAM

## 2021-11-02 PROCEDURE — 83605 ASSAY OF LACTIC ACID: CPT

## 2021-11-02 PROCEDURE — 85384 FIBRINOGEN ACTIVITY: CPT | Performed by: NURSE PRACTITIONER

## 2021-11-02 PROCEDURE — 250N000013 HC RX MED GY IP 250 OP 250 PS 637: Performed by: THORACIC SURGERY (CARDIOTHORACIC VASCULAR SURGERY)

## 2021-11-02 PROCEDURE — 84155 ASSAY OF PROTEIN SERUM: CPT | Performed by: NURSE PRACTITIONER

## 2021-11-02 PROCEDURE — 85730 THROMBOPLASTIN TIME PARTIAL: CPT | Performed by: STUDENT IN AN ORGANIZED HEALTH CARE EDUCATION/TRAINING PROGRAM

## 2021-11-02 PROCEDURE — 36620 INSERTION CATHETER ARTERY: CPT

## 2021-11-02 PROCEDURE — 84295 ASSAY OF SERUM SODIUM: CPT | Performed by: NURSE PRACTITIONER

## 2021-11-02 PROCEDURE — 99233 SBSQ HOSP IP/OBS HIGH 50: CPT | Mod: 25 | Performed by: INTERNAL MEDICINE

## 2021-11-02 PROCEDURE — 250N000009 HC RX 250: Performed by: NURSE PRACTITIONER

## 2021-11-02 PROCEDURE — 272N000010 HC KIT CATH ARTERIAL EXT SUPPLY

## 2021-11-02 PROCEDURE — 85018 HEMOGLOBIN: CPT | Performed by: NURSE PRACTITIONER

## 2021-11-02 PROCEDURE — 93325 DOPPLER ECHO COLOR FLOW MAPG: CPT

## 2021-11-02 PROCEDURE — 250N000011 HC RX IP 250 OP 636: Performed by: THORACIC SURGERY (CARDIOTHORACIC VASCULAR SURGERY)

## 2021-11-02 PROCEDURE — P9016 RBC LEUKOCYTES REDUCED: HCPCS

## 2021-11-02 PROCEDURE — 43235 EGD DIAGNOSTIC BRUSH WASH: CPT | Performed by: INTERNAL MEDICINE

## 2021-11-02 PROCEDURE — C9113 INJ PANTOPRAZOLE SODIUM, VIA: HCPCS | Performed by: NURSE PRACTITIONER

## 2021-11-02 PROCEDURE — 250N000009 HC RX 250

## 2021-11-02 PROCEDURE — 85027 COMPLETE CBC AUTOMATED: CPT | Performed by: STUDENT IN AN ORGANIZED HEALTH CARE EDUCATION/TRAINING PROGRAM

## 2021-11-02 PROCEDURE — 250N000011 HC RX IP 250 OP 636: Performed by: PHYSICIAN ASSISTANT

## 2021-11-02 PROCEDURE — 258N000003 HC RX IP 258 OP 636: Performed by: NURSE PRACTITIONER

## 2021-11-02 PROCEDURE — 82803 BLOOD GASES ANY COMBINATION: CPT | Performed by: STUDENT IN AN ORGANIZED HEALTH CARE EDUCATION/TRAINING PROGRAM

## 2021-11-02 PROCEDURE — 93325 DOPPLER ECHO COLOR FLOW MAPG: CPT | Mod: 26 | Performed by: INTERNAL MEDICINE

## 2021-11-02 PROCEDURE — 85384 FIBRINOGEN ACTIVITY: CPT | Performed by: ANESTHESIOLOGY

## 2021-11-02 PROCEDURE — 85610 PROTHROMBIN TIME: CPT

## 2021-11-02 PROCEDURE — 80048 BASIC METABOLIC PNL TOTAL CA: CPT | Performed by: STUDENT IN AN ORGANIZED HEALTH CARE EDUCATION/TRAINING PROGRAM

## 2021-11-02 PROCEDURE — 82247 BILIRUBIN TOTAL: CPT | Performed by: NURSE PRACTITIONER

## 2021-11-02 PROCEDURE — 82805 BLOOD GASES W/O2 SATURATION: CPT | Performed by: ANESTHESIOLOGY

## 2021-11-02 PROCEDURE — 250N000012 HC RX MED GY IP 250 OP 636 PS 637: Performed by: PHYSICIAN ASSISTANT

## 2021-11-02 PROCEDURE — 84450 TRANSFERASE (AST) (SGOT): CPT

## 2021-11-02 PROCEDURE — 84155 ASSAY OF PROTEIN SERUM: CPT

## 2021-11-02 PROCEDURE — P9016 RBC LEUKOCYTES REDUCED: HCPCS | Performed by: INTERNAL MEDICINE

## 2021-11-02 PROCEDURE — 999N000034 HC STATISTIC CODE BLUE ACCESS REQUIRED

## 2021-11-02 PROCEDURE — 80053 COMPREHEN METABOLIC PANEL: CPT | Performed by: STUDENT IN AN ORGANIZED HEALTH CARE EDUCATION/TRAINING PROGRAM

## 2021-11-02 PROCEDURE — 99233 SBSQ HOSP IP/OBS HIGH 50: CPT | Mod: 25 | Performed by: NURSE PRACTITIONER

## 2021-11-02 PROCEDURE — 258N000003 HC RX IP 258 OP 636: Performed by: THORACIC SURGERY (CARDIOTHORACIC VASCULAR SURGERY)

## 2021-11-02 PROCEDURE — 250N000013 HC RX MED GY IP 250 OP 250 PS 637: Performed by: ANESTHESIOLOGY

## 2021-11-02 PROCEDURE — 86923 COMPATIBILITY TEST ELECTRIC: CPT | Performed by: INTERNAL MEDICINE

## 2021-11-02 PROCEDURE — 258N000001 HC RX 258: Performed by: STUDENT IN AN ORGANIZED HEALTH CARE EDUCATION/TRAINING PROGRAM

## 2021-11-02 PROCEDURE — 36415 COLL VENOUS BLD VENIPUNCTURE: CPT | Performed by: STUDENT IN AN ORGANIZED HEALTH CARE EDUCATION/TRAINING PROGRAM

## 2021-11-02 PROCEDURE — 258N000003 HC RX IP 258 OP 636: Performed by: STUDENT IN AN ORGANIZED HEALTH CARE EDUCATION/TRAINING PROGRAM

## 2021-11-02 PROCEDURE — 86923 COMPATIBILITY TEST ELECTRIC: CPT

## 2021-11-02 PROCEDURE — P9056 BLOOD, L/R, IRRADIATED: HCPCS

## 2021-11-02 PROCEDURE — 94668 MNPJ CHEST WALL SBSQ: CPT

## 2021-11-02 PROCEDURE — 74018 RADEX ABDOMEN 1 VIEW: CPT | Mod: 26 | Performed by: RADIOLOGY

## 2021-11-02 PROCEDURE — 85384 FIBRINOGEN ACTIVITY: CPT | Performed by: STUDENT IN AN ORGANIZED HEALTH CARE EDUCATION/TRAINING PROGRAM

## 2021-11-02 PROCEDURE — 999N000015 HC STATISTIC ARTERIAL MONITORING DAILY

## 2021-11-02 PROCEDURE — 71260 CT THORAX DX C+: CPT

## 2021-11-02 PROCEDURE — 80048 BASIC METABOLIC PNL TOTAL CA: CPT | Performed by: NURSE PRACTITIONER

## 2021-11-02 PROCEDURE — 71045 X-RAY EXAM CHEST 1 VIEW: CPT

## 2021-11-02 PROCEDURE — 250N000011 HC RX IP 250 OP 636: Performed by: STUDENT IN AN ORGANIZED HEALTH CARE EDUCATION/TRAINING PROGRAM

## 2021-11-02 PROCEDURE — 85018 HEMOGLOBIN: CPT

## 2021-11-02 PROCEDURE — 85027 COMPLETE CBC AUTOMATED: CPT | Performed by: ANESTHESIOLOGY

## 2021-11-02 PROCEDURE — 87205 SMEAR GRAM STAIN: CPT | Performed by: INTERNAL MEDICINE

## 2021-11-02 PROCEDURE — 84295 ASSAY OF SERUM SODIUM: CPT

## 2021-11-02 PROCEDURE — 70450 CT HEAD/BRAIN W/O DYE: CPT

## 2021-11-02 RX ORDER — DEXTROSE MONOHYDRATE 25 G/50ML
25 INJECTION, SOLUTION INTRAVENOUS ONCE
Status: COMPLETED | OUTPATIENT
Start: 2021-11-02 | End: 2021-11-02

## 2021-11-02 RX ORDER — FENTANYL CITRATE 50 UG/ML
25 INJECTION, SOLUTION INTRAMUSCULAR; INTRAVENOUS EVERY 5 MIN PRN
Status: ACTIVE | OUTPATIENT
Start: 2021-11-03 | End: 2021-11-03

## 2021-11-02 RX ORDER — IOPAMIDOL 755 MG/ML
100 INJECTION, SOLUTION INTRAVASCULAR ONCE
Status: COMPLETED | OUTPATIENT
Start: 2021-11-02 | End: 2021-11-02

## 2021-11-02 RX ORDER — FENTANYL CITRATE 50 UG/ML
200 INJECTION, SOLUTION INTRAMUSCULAR; INTRAVENOUS
Status: ACTIVE | OUTPATIENT
Start: 2021-11-02 | End: 2021-11-02

## 2021-11-02 RX ORDER — PROTAMINE SULFATE 10 MG/ML
50 INJECTION, SOLUTION INTRAVENOUS ONCE
Status: DISCONTINUED | OUTPATIENT
Start: 2021-11-02 | End: 2021-11-02

## 2021-11-02 RX ORDER — PROTAMINE SULFATE 10 MG/ML
50 INJECTION, SOLUTION INTRAVENOUS ONCE
Status: COMPLETED | OUTPATIENT
Start: 2021-11-02 | End: 2021-11-02

## 2021-11-02 RX ORDER — TACROLIMUS 0.5 MG/1
0.5 CAPSULE ORAL
Status: DISCONTINUED | OUTPATIENT
Start: 2021-11-02 | End: 2021-11-05

## 2021-11-02 RX ORDER — NICOTINE POLACRILEX 4 MG
15-30 LOZENGE BUCCAL
Status: DISCONTINUED | OUTPATIENT
Start: 2021-11-02 | End: 2021-11-02

## 2021-11-02 RX ORDER — DEXTROSE MONOHYDRATE 100 MG/ML
INJECTION, SOLUTION INTRAVENOUS CONTINUOUS
Status: ACTIVE | OUTPATIENT
Start: 2021-11-02 | End: 2021-11-02

## 2021-11-02 RX ORDER — METHYLPREDNISOLONE SODIUM SUCCINATE 40 MG/ML
12 INJECTION, POWDER, LYOPHILIZED, FOR SOLUTION INTRAMUSCULAR; INTRAVENOUS EVERY MORNING
Status: DISCONTINUED | OUTPATIENT
Start: 2021-11-02 | End: 2021-11-02

## 2021-11-02 RX ORDER — DEXTROSE MONOHYDRATE 25 G/50ML
25-50 INJECTION, SOLUTION INTRAVENOUS
Status: DISCONTINUED | OUTPATIENT
Start: 2021-11-02 | End: 2021-11-02

## 2021-11-02 RX ORDER — TACROLIMUS 0.5 MG/1
1 CAPSULE ORAL
Status: DISCONTINUED | OUTPATIENT
Start: 2021-11-02 | End: 2021-11-06

## 2021-11-02 RX ORDER — MYCOPHENOLATE MOFETIL 200 MG/ML
1000 POWDER, FOR SUSPENSION ORAL 2 TIMES DAILY
Status: DISCONTINUED | OUTPATIENT
Start: 2021-11-02 | End: 2021-11-02

## 2021-11-02 RX ORDER — MYCOPHENOLATE MOFETIL 200 MG/ML
1000 POWDER, FOR SUSPENSION ORAL 2 TIMES DAILY
Status: DISCONTINUED | OUTPATIENT
Start: 2021-11-02 | End: 2021-12-05

## 2021-11-02 RX ORDER — PREDNISOLONE SODIUM PHOSPHATE 15 MG/5ML
12.5 SOLUTION ORAL EVERY EVENING
Status: COMPLETED | OUTPATIENT
Start: 2021-11-02 | End: 2021-11-06

## 2021-11-02 RX ORDER — METHYLPREDNISOLONE SODIUM SUCCINATE 40 MG/ML
10 INJECTION, POWDER, LYOPHILIZED, FOR SOLUTION INTRAMUSCULAR; INTRAVENOUS EVERY EVENING
Status: DISCONTINUED | OUTPATIENT
Start: 2021-11-02 | End: 2021-11-02

## 2021-11-02 RX ORDER — NOREPINEPHRINE BITARTRATE 0.06 MG/ML
.01-.6 INJECTION, SOLUTION INTRAVENOUS CONTINUOUS
Status: DISCONTINUED | OUTPATIENT
Start: 2021-11-02 | End: 2021-11-03

## 2021-11-02 RX ORDER — FLUCONAZOLE 2 MG/ML
400 INJECTION, SOLUTION INTRAVENOUS EVERY 24 HOURS
Status: COMPLETED | OUTPATIENT
Start: 2021-11-02 | End: 2021-11-25

## 2021-11-02 RX ORDER — CALCIUM CHLORIDE 100 MG/ML
1 INJECTION INTRAVENOUS; INTRAVENTRICULAR
Status: COMPLETED | OUTPATIENT
Start: 2021-11-02 | End: 2021-11-02

## 2021-11-02 RX ORDER — FENTANYL CITRATE 50 UG/ML
INJECTION, SOLUTION INTRAMUSCULAR; INTRAVENOUS
Status: COMPLETED
Start: 2021-11-02 | End: 2021-11-02

## 2021-11-02 RX ORDER — ACYCLOVIR 200 MG/5ML
400 SUSPENSION ORAL 2 TIMES DAILY
Status: COMPLETED | OUTPATIENT
Start: 2021-11-02 | End: 2021-11-15

## 2021-11-02 RX ORDER — PREDNISOLONE SODIUM PHOSPHATE 15 MG/5ML
15 SOLUTION ORAL EVERY MORNING
Status: COMPLETED | OUTPATIENT
Start: 2021-11-03 | End: 2021-11-06

## 2021-11-02 RX ADMIN — SODIUM CHLORIDE 8 MG/HR: 9 INJECTION, SOLUTION INTRAVENOUS at 20:34

## 2021-11-02 RX ADMIN — LIDOCAINE 2 PATCH: 560 PATCH PERCUTANEOUS; TOPICAL; TRANSDERMAL at 07:45

## 2021-11-02 RX ADMIN — Medication 40 MG: at 06:57

## 2021-11-02 RX ADMIN — PROTAMINE SULFATE 50 MG: 10 INJECTION, SOLUTION INTRAVENOUS at 08:37

## 2021-11-02 RX ADMIN — ACETAMINOPHEN 975 MG: 325 TABLET, FILM COATED ORAL at 02:08

## 2021-11-02 RX ADMIN — MEROPENEM 1 G: 1 INJECTION, POWDER, FOR SOLUTION INTRAVENOUS at 07:45

## 2021-11-02 RX ADMIN — EPINEPHRINE 0.1 MCG/KG/MIN: 1 INJECTION PARENTERAL at 08:30

## 2021-11-02 RX ADMIN — SODIUM BICARBONATE 50 MEQ: 84 INJECTION INTRAVENOUS at 08:44

## 2021-11-02 RX ADMIN — IOPAMIDOL 100 ML: 755 INJECTION, SOLUTION INTRAVENOUS at 13:19

## 2021-11-02 RX ADMIN — HUMAN INSULIN 10 UNITS: 100 INJECTION, SOLUTION SUBCUTANEOUS at 08:56

## 2021-11-02 RX ADMIN — ACETAMINOPHEN 975 MG: 325 TABLET, FILM COATED ORAL at 10:29

## 2021-11-02 RX ADMIN — CALCIUM CHLORIDE 1 G: 100 INJECTION INTRAVENOUS; INTRAVENTRICULAR at 08:44

## 2021-11-02 RX ADMIN — LEVALBUTEROL HYDROCHLORIDE 1.25 MG: 1.25 SOLUTION RESPIRATORY (INHALATION) at 13:36

## 2021-11-02 RX ADMIN — SODIUM BICARBONATE 50 MEQ: 84 INJECTION INTRAVENOUS at 09:12

## 2021-11-02 RX ADMIN — MYCOPHENOLATE MOFETIL 1500 MG: 200 POWDER, FOR SUSPENSION ORAL at 07:49

## 2021-11-02 RX ADMIN — NYSTATIN 1000000 UNITS: 500000 SUSPENSION ORAL at 20:19

## 2021-11-02 RX ADMIN — HUMAN INSULIN 1.5 UNITS/HR: 100 INJECTION, SOLUTION SUBCUTANEOUS at 03:39

## 2021-11-02 RX ADMIN — LEVALBUTEROL HYDROCHLORIDE 1.25 MG: 1.25 SOLUTION RESPIRATORY (INHALATION) at 15:37

## 2021-11-02 RX ADMIN — Medication 40 MG: at 06:56

## 2021-11-02 RX ADMIN — SODIUM BICARBONATE 50 MEQ: 84 INJECTION INTRAVENOUS at 08:48

## 2021-11-02 RX ADMIN — DEXTROSE MONOHYDRATE 50 ML: 500 INJECTION PARENTERAL at 12:30

## 2021-11-02 RX ADMIN — METHYLPREDNISOLONE SODIUM SUCCINATE 12 MG: 40 INJECTION, POWDER, LYOPHILIZED, FOR SOLUTION INTRAMUSCULAR; INTRAVENOUS at 14:00

## 2021-11-02 RX ADMIN — MEROPENEM 1 G: 1 INJECTION, POWDER, FOR SOLUTION INTRAVENOUS at 16:15

## 2021-11-02 RX ADMIN — ACYCLOVIR SODIUM 250 MG: 50 INJECTION, SOLUTION INTRAVENOUS at 13:42

## 2021-11-02 RX ADMIN — NYSTATIN 1000000 UNITS: 500000 SUSPENSION ORAL at 12:33

## 2021-11-02 RX ADMIN — MYCOPHENOLATE MOFETIL 1000 MG: 200 POWDER, FOR SUSPENSION ORAL at 20:17

## 2021-11-02 RX ADMIN — SODIUM CHLORIDE 500 ML: 9 INJECTION, SOLUTION INTRAVENOUS at 10:55

## 2021-11-02 RX ADMIN — OFLOXACIN 5 DROP: 3 SOLUTION AURICULAR (OTIC) at 20:19

## 2021-11-02 RX ADMIN — ACETYLCYSTEINE 2 ML: 200 SOLUTION ORAL; RESPIRATORY (INHALATION) at 13:36

## 2021-11-02 RX ADMIN — MIDAZOLAM 2 MG: 1 INJECTION INTRAMUSCULAR; INTRAVENOUS at 12:33

## 2021-11-02 RX ADMIN — MEROPENEM 1 G: 1 INJECTION, POWDER, FOR SOLUTION INTRAVENOUS at 00:11

## 2021-11-02 RX ADMIN — EPINEPHRINE 0.06 MCG/KG/MIN: 1 INJECTION PARENTERAL at 09:22

## 2021-11-02 RX ADMIN — Medication 0.03 MCG/KG/MIN: at 08:00

## 2021-11-02 RX ADMIN — PREDNISOLONE 12.5 MG: 15 SOLUTION ORAL at 20:18

## 2021-11-02 RX ADMIN — ACETYLCYSTEINE 2 ML: 200 SOLUTION ORAL; RESPIRATORY (INHALATION) at 21:28

## 2021-11-02 RX ADMIN — DEXTROSE MONOHYDRATE 25 G: 500 INJECTION PARENTERAL at 09:11

## 2021-11-02 RX ADMIN — SODIUM BICARBONATE 50 MEQ: 84 INJECTION INTRAVENOUS at 09:09

## 2021-11-02 RX ADMIN — SODIUM CHLORIDE 500 ML: 9 INJECTION, SOLUTION INTRAVENOUS at 13:00

## 2021-11-02 RX ADMIN — OFLOXACIN 5 DROP: 3 SOLUTION AURICULAR (OTIC) at 07:51

## 2021-11-02 RX ADMIN — LOPERAMIDE HYDROCHLORIDE 2 MG: 2 CAPSULE ORAL at 17:45

## 2021-11-02 RX ADMIN — NYSTATIN 1000000 UNITS: 500000 SUSPENSION ORAL at 16:16

## 2021-11-02 RX ADMIN — PANTOPRAZOLE SODIUM 40 MG: 40 INJECTION, POWDER, FOR SOLUTION INTRAVENOUS at 10:27

## 2021-11-02 RX ADMIN — FENTANYL CITRATE 100 MCG: 50 INJECTION INTRAMUSCULAR; INTRAVENOUS at 12:33

## 2021-11-02 RX ADMIN — SODIUM CHLORIDE 8 MG/HR: 9 INJECTION, SOLUTION INTRAVENOUS at 10:28

## 2021-11-02 RX ADMIN — LEVALBUTEROL HYDROCHLORIDE 1.25 MG: 1.25 SOLUTION RESPIRATORY (INHALATION) at 21:28

## 2021-11-02 RX ADMIN — TACROLIMUS 0.5 MG: 0.5 CAPSULE ORAL at 11:39

## 2021-11-02 RX ADMIN — CALCIUM CARBONATE 600 MG (1,500 MG)-VITAMIN D3 400 UNIT TABLET 1 TABLET: at 17:45

## 2021-11-02 RX ADMIN — TACROLIMUS 1 MG: 1 CAPSULE ORAL at 20:20

## 2021-11-02 RX ADMIN — ACETAMINOPHEN 975 MG: 325 TABLET, FILM COATED ORAL at 17:45

## 2021-11-02 RX ADMIN — ACETYLCYSTEINE 2 ML: 200 SOLUTION ORAL; RESPIRATORY (INHALATION) at 15:37

## 2021-11-02 RX ADMIN — DEXTROSE MONOHYDRATE 1000 ML: 100 INJECTION, SOLUTION INTRAVENOUS at 13:05

## 2021-11-02 RX ADMIN — Medication 0.03 MCG/KG/MIN: at 10:17

## 2021-11-02 RX ADMIN — FLUCONAZOLE IN SODIUM CHLORIDE 400 MG: 2 INJECTION, SOLUTION INTRAVENOUS at 13:52

## 2021-11-02 RX ADMIN — ACYCLOVIR 400 MG: 200 SUSPENSION ORAL at 20:17

## 2021-11-02 RX ADMIN — CALCIUM CHLORIDE 1 G: 100 INJECTION INTRAVENOUS; INTRAVENTRICULAR at 09:11

## 2021-11-02 ASSESSMENT — ACTIVITIES OF DAILY LIVING (ADL)
ADLS_ACUITY_SCORE: 22
ADLS_ACUITY_SCORE: 24
ADLS_ACUITY_SCORE: 22
ADLS_ACUITY_SCORE: 24
ADLS_ACUITY_SCORE: 22
ADLS_ACUITY_SCORE: 24
ADLS_ACUITY_SCORE: 22
ADLS_ACUITY_SCORE: 24
ADLS_ACUITY_SCORE: 22

## 2021-11-02 ASSESSMENT — MIFFLIN-ST. JEOR: SCORE: 1435

## 2021-11-02 NOTE — SIGNIFICANT EVENT
Informed wife of recent cardiac event and need for chest compressions. I also recommended visiting if able. She stated to continue doing everything for him.

## 2021-11-02 NOTE — PLAN OF CARE
Major Shift Events: Opens eyes spontaneously and withdraws from pain but does not follow commands.  SR with HR 80s-90s.  MAPs 70s-80s.  Bleeding from trach - red streaked sputum; team aware.  Shiley 6 cuffed trach to CMV/AC 50%.  Quick clot around trach for bleeding.  Rectal tube placed; large amounts of watery/loose stools.  Insulin gtt to keep blood sugars in goal range.  TF via J tube at 45ml/hr with 200ml FWF Q 4hrs.  G tube to gravity.  Heparin at 850 units/hr.  Adequate UOP via metzger.  Lab called regarding needing labs as close to 0600 as possible (for tacro level).   Plan: Continue to monitor.    For vital signs and complete assessments, please see documentation flowsheets.

## 2021-11-02 NOTE — PROCEDURES
[facility]    Arterial line placement    Date/Time: 11/2/2021 3:32 PM  Performed by: Jody Sifuentes MD  Authorized by: Jody Sifuentes MD     UNIVERSAL PROTOCOL   Site Marked: NA  Prior Images Obtained and Reviewed:  NA  Required items: Required blood products, implants, devices and special equipment available    Patient identity confirmed:  Arm band  Patient was reevaluated immediately before administering moderate or deep sedation or anesthesia  Confirmation Checklist:  Correct equipment/implants were available  Universal Protocol: the Joint Commission Universal Protocol was followed        Indication: respiratory failure hemodynamic monitoring  Location: left radial      SEDATION    Patient Sedated: No    PROCEDURE   Length of time physician/provider present for 1:1 monitoring during sedation: 0

## 2021-11-02 NOTE — PROGRESS NOTES
Edson Thornton is a 56 year old male with a h/o NSIP s/p bilateral lung transplantation on 10/6/2021.  His other PMHx include RA, SALTY, chronic HSV infection, hypogammaglobulinemia, steroid-induced diabetes, hypothyroidism, known PFO, hypertension, hyperlipidemia, anxiety/depression.  He has been admitted since 9/5/2021 for acute on chronic hypoxemic respiratory failure.  His postoperative course has been complicated by shock, thought to be cardiogenic, encephalopathy, embolic CVA, GIB secondary to end-stage trauma, Candida empyema and fungemia.  This morning, had an acute decompensation where he became bradycardic briefly, recurrent after 1 round of CPR without any medications given.  He became progressively hypotensive, and was started on vasopressors.  Per bedside RN, there was bloody output from his G-tube around 6:30 AM, less than 250 mL.  On stat labs, hemoglobin was 5.8, down from 8.2 this morning.  Heparin drip was stopped at that time.    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   - Continue to monitor tacrolimus trough levels and adjust dose as necessary.    - Agree with rechecking ammonia.    - Reduce MMF to 1000mg BID given recurrent GIB.  Consider adjusting Prednisone taper.      # Acute anemia likely due to GIB  - s/p EGD this morning by GI; Large amount of clotted blood and an area of raised mucosa near the PEG site that was clipped.    - On IV PPI. Reduced CellCept to 1000mgBID.     # Candida empyema  - Chest tube was reportedly inadvertently pulled after positive pleural fluid cultures.  On repeat CT there is re-accumulation of L>R pleural effusion.   - Recommend chest tube by thoracic or IR.      Jody Sifuentes MD

## 2021-11-02 NOTE — PROGRESS NOTES
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 Corral.  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 We are consulted for BRENNAN , help with diuresis and hypernatremia:     Non oliguric BRENNAN  Likely Prerenal now could have component of ATN in  The setting of hemorrhagic shock  Patient has a baseline creatinine of 0.7 but had a lung transplant on 10/16 and he developed brennan from hemodynamic changes during the surgical potassium. But his kidney recovers on 10/25 but at that time he got CTA head and neck with contrast and did cause a slight bump in creatinine but its unlikely he had contrast induced nephropathy as his creatinine is trending down. He is getting Lasix and he is more than 3 liter down and weight wise also 3 liter down. Probably he needs some fluid therefore will hold off to anymore diuretic and can consider giving 500 ml of RL as bolus as it will also help with keeping his MAP>70.He is on tacrolimus which is >11 and on the long term he will develop some level of CNI toxicity.Fe urea suggestive of pre renal BRENNAN.On 11/2 patient has a PEA and he was found to be bleeding in his abdomen for which a CT scan and EGD has been done. Patient creatinine worsen to 1.3  -Keep MAP>70 to keep that goal especially in the setting he will be on tacrolimus and ATN   -Also has 1.58 of pro:creatinine ratio could be in the setting of hemodynaic changes  -Accurate I/O's   -Daily weight  -Monitor BMP  -Avoid nephrotoxic medication  -IS management per lung transplant team      Hypertension:  Patient is on amlodipine 10 mg and got a dose of Coreg 12.5 mg. Holding all of them in the setting of hemorrhagic shock.  "Consider keeping MAP>70 given use of tacrolimus and kaci  -monitor Blood pressure  - hold all antihypertensive for now given the hemorraghic shock     Hypernatremia:  Patient has a sodium of 150 and free  Water deficit of 2.9 liter  -give him 300 ml every 2 hour   -monitor sodium  Recommendations were communicated to primary team via phone     Seen and discussed with Dr. Rustam Kumar MD   [phone]    I have seen and examined this patient with the resident.  This note reflects our joint assessment and plan.     Meli Easton MD      Interval History :   Nursing and provider notes from last 24 hours reviewed.  In the last 24 hours Edson Thornton was in hemorrhagic shock and had PEA and required chest compression.  Review of Systems:   I reviewed the following systems:  Cannot be obtained as intubated and sedated    Physical Exam:   I/O last 3 completed shifts:  In: 5271.01 [I.V.:779.01; NG/GT:1100; IV Piggyback:1000]  Out: 2435 [Urine:1385; Emesis/NG output:150; Stool:900]   BP 94/63   Pulse 98   Temp 99.1  F (37.3  C)   Resp 27   Ht 1.626 m (5' 4\")   Wt 69.4 kg (153 lb)   SpO2 96%   BMI 26.26 kg/m       GENERAL APPEARANCE: Patient is intubated  EYES:  No scleral icterus, pupils equal  HENT: mouth without ulcers or lesions  PULM: lungs clear to auscultation bilaterally, equal air movement, no clubbing  CV: Patient has regular rhythm, normal rate, no rub     -JVD -ve     -edema -ve   GI: soft, non tender, Non distended, bowel sounds are present  INTEGUMENT: no cyanosis, No rash  NEURO:  Cannot be assessed as sedated   Access none    Labs:   All labs reviewed by me  Electrolytes/Renal - Recent Labs   Lab Test 11/02/21  1609 11/02/21  1400 11/02/21  1305 11/02/21  1302 11/02/21  1214 11/02/21  1211 11/02/21  0826 11/02/21  0815 11/02/21  0609 11/02/21  0556 11/01/21  0819 11/01/21  0818   NA  --  150*  --  152*  --  153*  153*   < > 153*   < > 146*   < > 149*   POTASSIUM  --  4.4  --  4.6  --  4.6 "  4.6   < > 4.7   < > 5.1   < > 4.7   CHLORIDE  --  115*  --  116*  --  117*  117*   < > 119*   < > 113*   < > 116*   CO2  --  32  --  32  --  32  32   < > 22   < > 26   < > 28   BUN  --  97*  --  93*  --  95*  95*   < > 82*   < > 101*   < > 99*   CR  --  1.30*  --  1.38*  --  1.30*  1.30*   < > 1.17   < > 1.09   < > 1.08   * 121*  111*   < > 152*   < > 56*  56*   < > 240*  225*   < > 140*   < > 146*   ERIK  --  9.4  --  9.7  --  10.0  10.0   < > 7.2*   < > 9.3   < > 9.6   MAG  --   --   --   --   --   --   --  1.9  --  2.2  --  2.2   PHOS  --   --   --   --   --   --   --  7.0*  --  3.1  --  2.7    < > = values in this interval not displayed.       CBC -   Recent Labs   Lab Test 11/02/21  1607 11/02/21  1400 11/02/21  1302 11/02/21  1053 11/02/21  1053 11/02/21  0927 11/02/21  0927   WBC 40.1*  --   --   --  57.4*  57.4*  --  48.0*   HGB 10.5* 9.2* 10.1*   < > 8.8*   < > 7.7*   *  --   --   --  244  --  232    < > = values in this interval not displayed.       LFTs -   Recent Labs   Lab Test 11/02/21  1211 11/02/21  0832 11/02/21  0815   ALKPHOS 182* 139 110   BILITOTAL 0.6 0.3 0.2   * 72* 50   * 68* 33   PROTTOTAL 4.5* 4.4* 3.8*   ALBUMIN 1.5* 1.4* 1.1*       Iron Panel -   Recent Labs   Lab Test 10/31/21  0610 09/09/21  0536   IRON 35 146   IRONSAT 11* 77*   ARMEN  --  1,049*         Imaging:  All imaging studies reviewed by me.     Current Medications:   acetaminophen  975 mg Oral or Feeding Tube Q8H MARKY    acetylcysteine  2 mL Nebulization 4x Daily    acyclovir  400 mg Oral or J tube BID    amiodarone  200 mg Oral or Feeding Tube Daily    calcium carbonate 600 mg-vitamin D 400 units  1 tablet Oral or Feeding Tube BID w/meals    fluconazole  400 mg Intravenous Q24H    insulin glargine  40 Units Subcutaneous QAM AC    levalbuterol  1.25 mg Nebulization 4x Daily    levothyroxine  25 mcg Oral Daily    lidocaine  2 patch Transdermal Q24H    lidocaine   Transdermal Q8H    meropenem   1 g Intravenous Q8H    multivitamins w/minerals  15 mL Per Feeding Tube Daily    mycophenolate  1,000 mg Per J Tube BID    nystatin  1,000,000 Units Swish & Swallow 4x Daily    ofloxacin  5 drop Right Ear BID    [Held by provider] pantoprazole  40 mg Per Feeding Tube BID AC    prednisoLONE  12.5 mg Per J Tube QPM    [START ON 11/3/2021] prednisoLONE  15 mg Per J Tube QAM    rosuvastatin  10 mg Oral or Feeding Tube Daily    senna-docusate  1 tablet Oral or Feeding Tube BID    sodium chloride (PF)  3 mL Intracatheter Q8H    [Held by provider] sulfamethoxazole-trimethoprim  10 mL Oral or J tube Daily    Or    [Held by provider] sulfamethoxazole-trimethoprim  1 tablet Oral or Feeding Tube Daily    tacrolimus  0.5 mg Sublingual QAM    tacrolimus  1 mg Sublingual QPM      dextrose 45 mL/hr at 11/02/21 1600    dextrose Stopped (10/24/21 1008)    EPINEPHrine Stopped (11/02/21 1340)    [Held by provider] heparin Stopped (11/02/21 0740)    insulin regular Stopped (11/02/21 1100)    norepinephrine Stopped (11/02/21 1345)    pantoprazole (PROTONIX) infusion ADULT/PEDS GREATER than or EQUAL to 45 kg 8 mg/hr (11/02/21 1600)    BETA BLOCKER NOT PRESCRIBED       Tyrone Kumar MD

## 2021-11-02 NOTE — PLAN OF CARE
Major Shift Events: pt remains lethargic, pupil unequal; MD aware, not following commands, opens eye spontaneously. Pt's HR dropped to 60's to asystole, 1 run of CPR, hypotensive MAP's 40's: levo and epi started this morning;  see flowsheet, art line, CVC placed, MTP protocol initiated, 4 units RBC & FFP, 2L bolus given this am, Heparin stopped due to G tube bleeding, EGD completed this shift, Protonix gtt started, IV protamine given, chest CT head/abd/pelvis completed. Trached, shiley 6 cuffed, minimal bleeding noted, on CMV settings, peep increased to 10, RR of 16 and FiO2 80%. Insulin gtt stopped; D10 infusing @ 45 ml/hr. Blood culture sent. TF on hold, meds okay to given via J. G to gravity. Watson in place.    Plan: Possible EGD tomorrow per GI. continue to monitor ABG, labs, and notify MD with changes.  For vital signs and complete assessments, please see documentation flowsheets.

## 2021-11-02 NOTE — PROCEDURES
[facility]    Central line    Date/Time: 11/2/2021 2:11 PM  Performed by: Dimitri Pritchett MD  Authorized by: Sarah Del Cid MD   Indications: vascular access    UNIVERSAL PROTOCOL   Site Marked: NA  Prior Images Obtained and Reviewed:  NA  Required items: Required blood products, implants, devices and special equipment available    Patient identity confirmed:  Arm band  NA - No sedation, light sedation, or local anesthesia  Confirmation Checklist:  Patient's identity using two indicators, relevant allergies, procedure was appropriate and matched the consent or emergent situation and correct equipment/implants were available  Time out: Immediately prior to the procedure a time out was called    Universal Protocol: the Joint Commission Universal Protocol was followed    Preparation: Patient was prepped and draped in usual sterile fashion    ESBL (mL):  5        SEDATION    Patient Sedated: Yes    Sedation Type:  Moderate (conscious) sedation  Sedation:  Fentanyl  Vital signs: Vital signs monitored during sedation      Preparation: skin prepped with 2% chlorhexidine  Skin prep agent dried: skin prep agent completely dried prior to procedure  Sterile barriers: all five maximum sterile barriers used - cap, mask, sterile gown, sterile gloves, and large sterile sheet  Hand hygiene: hand hygiene performed prior to central venous catheter insertion  Catheter type: double lumen  Catheter size: 9 Fr  Pre-procedure: landmarks identified  Ultrasound guidance: yes  Sterile ultrasound techniques: sterile gel and sterile probe covers were used  Number of attempts: 2  Successful placement: yes  Post-procedure: line sutured and dressing applied  Assessment: blood return through all ports    PROCEDURE   Patient Tolerance:  Patient tolerated the procedure well with no immediate complications     Emergent line needed for access/medication administration.   Length of time  physician/provider present for 1:1 monitoring during sedation: 30

## 2021-11-02 NOTE — CONSULTS
GASTROENTEROLOGY CONSULTATION      Date of Admission:  9/5/2021          ASSESSMENT AND RECOMMENDATIONS:   Assessment:  A 56 year old gentleman admitted 9/5/21 with acute on chronic respiratory failure now s/p 10/16/21 bilateral lung transplant for NSIP / ILD immunosuppressed (tacrolimus, mycophenolate, prednisone)  with rheumatoid arthritis who also has a history of bronchiectasis, moderate PH, SALTY, chronic HSV infection, hypogammaglobulinemia, steroid-induced diabetes, hypothyroidism, hypertension, hyperlipidemia, duodenal anomaly, anxiety, and depression. 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 Ventilator dependent (on low vent settings, s/p 10/29/21 tracheostomy) with a multifactorial encephalopathy and persistent leukocytosis, now with recurrent GI bleeding.    Acute blood loss anemia  Blood via PEG  S/p BSLT c/b CVA, ventilator dependence   MAPs to 69 overnight, marked tachycardia this am corresponding with hgb 8.2 >>> 5.8 over ~2 hr this am for which levo and epi started (had been off pressors ~1 week). Significant BUN increase vs creatinine suggests UGI source of bleeding. Given blood via PEG, likely stress ulcer vs recurrent OG trauma vs PUD, though on BID PPI. Dieulafoy, ischemic enteritis possible. All potentially exacerbated by heparin ggt and prednisone. Will continue to monitor resuscitation efforts, ideally EGD following relative stabilization but will expedite if hemodynamics not responding to massive transfusion protocol     Recommendations  -Keep TF off, NPO  -PPI ggt  -Agree with massive transfusion protocol  -EGD today at bedside    Thank you for involving us in this patient's care. Please do not hesitate to contact the GI service with any questions or concerns.     Pt care plan discussed with Dr. Ortiz, GI staff physician.    Miguel Burch, ILIR CNP  Text  page  -------------------------------------------------------------------------------------------------------------------          Chief Complaint:   We were asked by Dr Corral of CVICU to evaluate this patient with blood loss anemia    History is obtained from the patient and the medical record.          History of Present Illness:   A 56 year old gentleman admitted 9/5/21 with acute on chronic respiratory failure now s/p 10/16/21 bilateral lung transplant for NSIP / ILD immunosuppressed (tacrolimus, mycophenolate, prednisone)  with rheumatoid arthritis who also has a history of bronchiectasis, moderate PH, SALTY, chronic HSV infection, hypogammaglobulinemia, steroid-induced diabetes, hypothyroidism, hypertension, hyperlipidemia, duodenal anomaly, anxiety, and depression. 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 Ventilator dependent (on low vent settings, s/p 10/29/21 tracheostomy) with a multifactorial encephalopathy and persistent leukocytosis, now with recurrent GI bleeding.     Loose stools without evidence of blood as of this am. Armando blood via GT of PEGJ at 0800 this am at which time TF were stopped. Also with frequent oozing around trach site yesterday. Levo and epi restarted at 0800 when hgb noted down from 8.2 to 5.8, along with tachycardia and softer pressures. Also with new abdominal distension, axr does not show significant gaseous distension. Not sedated at present. As of yesterday was wiggling toes on command, less responsive this am per nursing.    EGD 10/23 for blood via OGT demonstrated gastric/duodenal erosions believes secondary to OG/NG trauma. No endoscopic treatment. Has been on BID PPI since. Heparin drip was started 2/2 to CVA following lung transplant.             Past Medical History:   Reviewed and edited as appropriate  Past Medical History:   Diagnosis Date     BRENNAN (acute kidney injury) (H) 10/17/2021      Anxiety      Depression      HLD (hyperlipidemia)      HTN (hypertension)      Hypothyroidism      ILD (interstitial lung disease) (H)      SALTY on CPAP      Oxygen dependent     BL 4L since ~6/2021     Rheumatoid arthritis (H)     signs ~5/2020, dx 5/2021     S/P lung transplant (H) 10/16/2021     Shock liver 10/17/2021     Steroid-induced hyperglycemia      Traction bronchiectasis (H)             Past Surgical History:   Reviewed and edited as appropriate   Past Surgical History:   Procedure Laterality Date     COLONOSCOPY W/ BIOPSIES AND POLYPECTOMY  07/21/2020     CV CORONARY ANGIOGRAM N/A 9/8/2021    Procedure: Coronary Angiogram with possible intervention;  Surgeon: Jovon Bullock MD;  Location:  HEART [facility]ARDI[facility] CATH LAB     CV RIGHT HEART CATH MEASUREMENTS RECORDED N/A 9/8/2021    Procedure: Right Heart Cath;  Surgeon: Jovon Bullock MD;  Location:  HEART CARDIAC CATH LAB     ESOPHAGOSCOPY, GASTROSCOPY, DUODENOSCOPY (EGD), COMBINED N/A 10/23/2021    Procedure: ESOPHAGOGASTRODUODENOSCOPY (EGD);  Surgeon: Miquel Pisano MD;  Location:  GI     EXTRACTION(S) DENTAL N/A 9/22/2021    Procedure: EXTRACTION tooth #19;  Surgeon: Deepak Tobin DDS;  Location:  OR     HERNIA REPAIR       right acl       TRACHEOSTOMY PERCUTANEOUS N/A 10/29/2021    Procedure: Percutaneous Tracheostomy,;  Surgeon: Celine Jenkins MD;  Location: [facility] OR     TRANSPLANT LUNG RECIPIENT SINGLE X2 Bilateral 10/16/2021    Procedure: TRANSPLANT, LUNG, RECIPIENT, BILATERAL, Bronchoscopy, on-pump perfusion, bilateral clamshell sternotomy;  Surgeon: Yanick Corral MD;  Location: [facility] OR     XR ACROMIOCLAVICULAR JOINT BILATERAL              Previous Endoscopy:   No results found for this or any previous visit.         Social History:   Reviewed and edited as appropriate  Social History     Socioeconomic History     Marital status: Single     Spouse name: Not on file     Number of children: Not on file      Years of education: Not on file     Highest education level: Not on file   Occupational History     Not on file   Tobacco Use     Smoking status: Never Smoker     Smokeless tobacco: Never Used   Substance and Sexual Activity     Alcohol use: Never     Drug use: Never     Sexual activity: Not on file   Other Topics Concern     Parent/sibling w/ CABG, MI or angioplasty before 65F 55M? Not Asked   Social History Narrative     Not on file     Social Determinants of Health     Financial Resource Strain:      Difficulty of Paying Living Expenses:    Food Insecurity:      Worried About Running Out of Food in the Last Year:      Ran Out of Food in the Last Year:    Transportation Needs:      Lack of Transportation (Medical):      Lack of Transportation (Non-Medical):    Physical Activity:      Days of Exercise per Week:      Minutes of Exercise per Session:    Stress:      Feeling of Stress :    Social Connections:      Frequency of Communication with Friends and Family:      Frequency of Social Gatherings with Friends and Family:      Attends Church Services:      Active Member of Clubs or Organizations:      Attends Club or Organization Meetings:      Marital Status:    Intimate Partner Violence:      Fear of Current or Ex-Partner:      Emotionally Abused:      Physically Abused:      Sexually Abused:             Family History:   Reviewed and edited as appropriate  Family History   Problem Relation Age of Onset     Diabetes Type 1 Mother      Heart Disease Mother      Chronic Obstructive Pulmonary Disease Mother      Rheumatoid Arthritis Father      Emphysema Paternal Grandfather        The patients family history was reviewed today and is non-contributory.          Allergies:   Reviewed and edited as appropriate   No Known Allergies         Medications:     Current Facility-Administered Medications   Medication     acetaminophen (TYLENOL) tablet 975 mg     acetylcysteine (MUCOMYST) 20 % nebulizer solution 2 mL      acyclovir (ZOVIRAX) suspension 400 mg     amiodarone (PACERONE) tablet 200 mg     amLODIPine (NORVASC) tablet 10 mg     banatrol plus packet 1 packet     bisacodyl (DULCOLAX) Suppository 10 mg     calcium carbonate 600 mg-vitamin D 400 units (CALTRATE) per tablet 1 tablet     dextrose 10% infusion     dextrose 10% infusion     dextrose 10% infusion     dextrose 50 % injection 25 g     glucose gel 15-30 g    Or     dextrose 50 % injection 25-50 mL    Or     glucagon injection 1 mg     diphenhydrAMINE (BENADRYL) capsule 25 mg    Or     diphenhydrAMINE (BENADRYL) injection 25 mg     EPINEPHrine (ADRENALIN) 5 mg in sodium chloride 0.9 % 250 mL infusion     fluconazole (DIFLUCAN) tablet 400 mg     [Held by provider] heparin 25,000 units in 0.45% NaCl 250 mL ANTICOAGULANT infusion     insulin 1 unit/mL in saline (NovoLIN, HumuLIN Regular) drip - ADULT IV Infusion     insulin glargine (LANTUS PEN) injection 40 Units     labetalol (NORMODYNE/TRANDATE) injection 20 mg     levalbuterol (XOPENEX) neb solution 1.25 mg     levothyroxine (SYNTHROID/LEVOTHROID) tablet 25 mcg     Lidocaine (LIDOCARE) 4 % Patch 2 patch     lidocaine (LMX4) cream     lidocaine (XYLOCAINE) 2 % solution 5 mL     lidocaine 1 % 0.1-1 mL     lidocaine patch in PLACE     loperamide (IMODIUM) capsule 2 mg     magnesium hydroxide (MILK OF MAGNESIA) suspension 30 mL     meropenem (MERREM) 1 g vial to attach to  mL bag     metoprolol tartrate (LOPRESSOR) tablet 25 mg     multivitamins w/minerals liquid 15 mL     mycophenolate (CELLCEPT BRAND) suspension 1,000 mg     naloxone (NARCAN) injection 0.2 mg    Or     naloxone (NARCAN) injection 0.4 mg    Or     naloxone (NARCAN) injection 0.2 mg    Or     naloxone (NARCAN) injection 0.4 mg     nystatin (MYCOSTATIN) suspension 1,000,000 Units     ofloxacin (FLOXIN) 0.3 % otic solution 5 drop     ondansetron (ZOFRAN-ODT) ODT tab 4 mg    Or     ondansetron (ZOFRAN) injection 4 mg     oxymetazoline (AFRIN) 0.05  "% spray 2 spray     [Held by provider] pantoprazole (PROTONIX) 2 mg/mL suspension 40 mg     pantoprazole (PROTONIX) 80 mg in sodium chloride 0.9 % 100 mL infusion     pantoprazole (PROTONIX) IV push injection 40 mg     potassium chloride (KAYCIEL) solution 20 mEq     predniSONE (DELTASONE) tablet 15 mg    And     predniSONE (DELTASONE) tablet 12.5 mg     prochlorperazine (COMPAZINE) injection 10 mg    Or     prochlorperazine (COMPAZINE) tablet 10 mg     protein modular (PROSOURCE TF) 1 packet     Reason beta blocker order not selected     rosuvastatin (CRESTOR) tablet 10 mg     senna-docusate (SENOKOT-S/PERICOLACE) 8.6-50 MG per tablet 1 tablet     sodium chloride (PF) 0.9% PF flush 3 mL     sodium chloride (PF) 0.9% PF flush 3 mL     sulfamethoxazole-trimethoprim (BACTRIM/SEPTRA) suspension 80 mg    Or     sulfamethoxazole-trimethoprim (BACTRIM) 400-80 MG per tablet 1 tablet     tacrolimus (GENERIC EQUIVALENT) suspension 2 mg             Review of Systems:     A complete review of systems was performed and is negative except as noted in the HPI           Physical Exam:   /85   Pulse (!) 130   Temp 100  F (37.8  C)   Resp 22   Ht 1.626 m (5' 4\")   Wt 69.4 kg (153 lb)   SpO2 100%   BMI 26.26 kg/m    Wt:   Wt Readings from Last 2 Encounters:   11/02/21 69.4 kg (153 lb)      Constitutional: cooperative, pleasant, not dyspneic/diaphoretic, no acute distress  Eyes: Sclera anicteric/injected  Ears/nose/mouth/throat: Dry MM  Neck: supple without obvious mass, trach with dry blood  CV: +1 LLE  Respiratory: Mechanically ventilated via trach  Lymph: No submandibular, supraclavicular or inguinal lymphadenopathy  Abd: Distended, +bs, no hepatosplenomegaly, nontender, no peritoneal signs  Skin: warm, perfused, no jaundice, ecchymosis abdomen/thorax  Neuro: Not responsive to verbal/physical stimuli  Psych: Normal affect  MSK: No gross deformities         Data:   Labs and imaging below were independently reviewed and " interpreted    BMP  Recent Labs   Lab 11/02/21  0904 11/02/21  0847 11/02/21  0832 11/02/21  0826 11/02/21  0815 11/02/21  0609 11/02/21  0556 11/01/21  0819 11/01/21  0818 10/31/21  1356 10/31/21  1349   NA  --   --  144  --  153*  --  146*  --  149*   < > 146*   POTASSIUM  --   --  6.6* 7.2* 4.7  --  5.1   < > 4.7   < > 4.3   CHLORIDE  --   --  112*  --  119*  --  113*  --  116*   < > 112*   ERIK  --   --   --   --  7.2*  --  9.3  --  9.6  --  9.4   CO2  --   --   --   --  22  --  26  --  28  --  28   BUN  --   --   --   --  82*  --  101*  --  99*  --  102*   CR  --   --   --   --  1.17  --  1.09  --  1.08  --  1.13   * 229*  --   --  240*  225*   < > 140*   < > 146*   < > 138*    < > = values in this interval not displayed.     CBC  Recent Labs   Lab 11/02/21  0815 11/02/21  0556 11/02/21  0556 11/01/21  0818 11/01/21  0818 10/31/21  0610 10/31/21  0610   WBC 37.5*  --  32.6*  --  28.4*  --  26.6*   RBC 1.83*  --  2.64*  --  3.08*  --  2.66*   HGB 5.8*   < > 8.2*   < > 9.6*   < > 8.2*   HCT 19.6*  --  27.3*  --  31.4*  --  26.9*   *  --  103*  --  102*  --  101*   MCH 31.7  --  31.1  --  31.2  --  30.8   MCHC 29.6*  --  30.0*  --  30.6*  --  30.5*   RDW 18.1*  --  18.0*  --  17.8*  --  16.9*     --  355  --  308  --  196    < > = values in this interval not displayed.     INR  Recent Labs   Lab 11/02/21  0832 11/02/21  0556 11/01/21  0818 10/31/21  0610   INR 1.38* 1.18* 1.13 1.17*     LFTs  Recent Labs   Lab 11/02/21  0815 11/02/21  0556 11/01/21 0818 10/31/21  0610   ALKPHOS 110 150 166* 127   AST 33 39 50* 20   ALT 50 72* 57 33   BILITOTAL 0.2 0.2 0.2 0.2   PROTTOTAL 3.8* 5.3* 6.2* 5.7*   ALBUMIN 1.1* 1.6* 1.8* 1.6*      PANCNo lab results found in last 7 days.    Imaging:    @CT-scan of abdomen and pelvis@

## 2021-11-02 NOTE — PROGRESS NOTES
CLINICAL NUTRITION SERVICES - REASSESSMENT NOTE     Nutrition Prescription    Malnutrition Status:    Moderate malnutrition in the context of acute on chronic illness    Future Recommendations:  Resume TF via [employer] as medically appropriate.        EVALUATION OF THE PROGRESS TOWARD GOALS   Diet: NPO (vented)   Nutrition Support: Novasource Renal @ 45 ml/hr (1080 ml) + Prosource (1 pkt TID) + Banatrol (1 pkt BID) provides 2280 kcal (36 kcal/kg), 98 g pro (2 g/kg), 198 g CHO, 774 ml free water, and 4 g fiber daily. Micro/Nx: Certavite, caltrate BID    Intake: Met 100% needs (from TF over past week)      NEW FINDINGS   Currently, feeds held for GIB.     Renal: Elevated BUN in setting of GIB.   GI: S/p PEGJ (10/29). Today, Loose stools with blood and melanie blood from Gtube. Distended abdomen. Subsequent EGD with large adherent gastric clot and small gastric erosion s/p clip (11/2). Plan for repeat EGD tomorrow (11/3).     MALNUTRITION  % Intake: No decreased intake noted  % Weight Loss: Difficult to assess with fluctuations in fluid status   Subcutaneous Fat Loss: Facial region: Mild, Upper arm: Mild and Lower arm: Mild  Muscle Loss: Temporal: Moderate, Upper arm (bicep, tricep): Mild, Lower arm  (forearm): Mild, Dorsal hand: Moderate, Patellar region: Mild and Posterior calf: Mild. Edema obtunding muscle status.   Fluid Accumulation/Edema: Does not meet criteria  Malnutrition Diagnosis: Moderate malnutrition in the context of acute on chronic illness    Previous Goals   Total avg nutritional intake to meet a minimum of 30 kcal/kg and 1.3 g PRO/kg daily (per dosing wt 64 kg).  Evaluation: Met    Previous Nutrition Diagnosis  Inadequate protein-energy intake  Evaluation: Improving    CURRENT NUTRITION DIAGNOSIS  Inadequate protein-energy intake related to NPO (vented) dependent on enteral nutrition support to meet entire nutrition needs as evidenced by feeds currently held in setting of GIB.       INTERVENTIONS  None  additionally at this time. See future recommendations.     Goals  Total avg nutritional intake to meet a minimum of 30 kcal/kg and 1.3 g PRO/kg daily (per dosing wt 64 kg).    Monitoring/Evaluation  Progress toward goals will be monitored and evaluated per protocol.    Sona Amezquita, ARMOND, LD  [id number]  Pgr: [phone]

## 2021-11-02 NOTE — PROGRESS NOTES
0730: Bloody G tube, trach continues to bleed, 's, RR 37, notified CVTS resident, asked if heparin can be held since pt is bleeding from the G tube and trach site. MD said continue the heparin gtt.    0745: CVTS fellow at bedside, notified MD that G tube is bleeding and trach site continues to bleed, 's, RR 37. Asked MD if heparin can be stopped; MD said to stop the heparin gtt. Heparin gtt stopped. TF stopped.    0800: This writer was in the patient's room, administrating am medications, all sudden decreased in HR to 80 to 60 noted and pt went asystole afterwards. Pulse checked, no pulse, code blue called, CPR started, HR back to 90's, pulse present, within a minutes, pt went into asystole, 1 rounds of CPR, pt's HR in the 90's, MAP's in the 40's, pupil irregular; MD at bedside. Labs checked, hgb 5.8, MTP started, LR bolus given, levo, and epi started, K+ 7.2, 10 units of insulin, 4 amps of bicarb and 2 amps of calcium given. Stat chest & abdominal x-ray, CT of head/abdomen/pelvis, echo ordered. Art line placed to L radial and CVC to R femoral by CVTS. GI consulted.

## 2021-11-02 NOTE — PROGRESS NOTES
Pulmonary Medicine  Cystic Fibrosis - Lung Transplant Team  Progress Note  2021       Patient: Edson Thornton  MRN: [id number]  : 1965 (age 56 year old)  Transplant: 10/16/2021 (Lung), POD#17  Admission date: 2021    Assessment & Plan:     Edson Thornton is a 56 year old male with a PMH significant for NSIP/ILD, bronchiectasis, moderate PH, RA, SALTY, chronic HSV infection, hypogammaglobulinemia, steroid-induced diabetes, hypothyroidism, PFO, HTN, HLD, duodenal anomaly, anxiety, and depression.  Admitted on 21 from OSH for acute on chronic respiratory failure 2/2 ILD exacerbation, now s/p BSLT on 10/16/21. Surgery relatively uncomplicated but pt. with low cardiac index and PGD immediately post-op.  Caroline weaned off 10/22, remains intubated.  Post-op course complicated by encephalopathy and diffuse weakness, acute to subacute CVA, afib with RVR, BRENNAN, GI bleed due to NJ/OG trauma, Candidemia/Candida empyema, and recurrent fevers.  Neuro status remains tenuous, although some improvement noted since 10/29.  Code blue called this morning due to bradycardia/asystole (required 1 round of CPR, no medications) and then progressively hypotension requiring pressors, noted to have acute drop in hemoglobin and bloody G tube output.      Today's recommendations:   - CXR daily  - Obtain chest CT as able to evaluate feasibility of chest tube placement given Candida empyema  - Defer bronch today  - Tacrolimus level therapeutic, adjusted to SL administration given GI bleed, continue daily levels for now (ordered)  - Decrease MMF and adjust to IV given GI bleed (ordered)  - Adjust prednisone to IV methylprednisolone given GI bleed (ordered), next taper due  (not yet ordered)  - Hold Bactrim for now given Cr/potassium trend (ordered)  - CMV and EBV ordered   - Coccidioides Ag ordered   - Trach sputum culture (ordered)  - Continue meropenem for empiric coverage, revisit daily  - Blood cultures  (ordered)  - Continue fluconazole at least through 11/8  - IgG ordered 11/17  - DSA (11/1) pending  - Donor risk labs ordered 11/15  - Repeat ammonia  - Repeat head CT as able  - Agree with GI consult/scope and abdominal CT as able given concern for GI bleed     S/p BSLT for ILD:  Acute hypoxic respiratory failure s/p trach:   Pulmonary edema:  Subcutaneous emphysema: Unfortunately had not received vaccination for flu, PNA, or COVID-19 PTA.  Explant pathology with NSIP, no malignancy.  PGD 2-3.  Weaned off paralytic 10/19 (for vent dyssynchrony), pressors 10/21 (now hypotensive again as below), and Caroline 10/22.  Initial difficulty weaning sedation given agitation then with neurological findings as below.  CXR initially post-op with pulmonary edema, aggressively diuresed.  Recurring fevers post-op, see ID section below.  Bronch 10/20 with tan secretions to LLL, repeat 10/23 with frothy clear secretions in right TB tree.  Chest CT (10/25) with interval improvement in consolidative opacities to BLL and KARI with trace right hydroPTX (personally reviewed).  Bronch (10/26) with minimal sticky secretions, anastomosis intact, repeat (10/30) with thick secretions in the RUL and LLL and mild mucosal erythema.  Remains intubated and with persistent encephalopathy as below, underwent trach and PEG/J tube placement with thoracic surgery 10/29.  Code blue called 11/2 for bradycardia/asystole (required 1 round of CPR, no medications) then progressively hypotensive requiring pressors, concern for GI bleed as below.  - Nebs: levalbuterol and Mucomyst QID  - Aggressive pulmonary toilet with chest physiotherapy QID  - Ventilator management per ICU team  - Volume management per nephrology and ICU team  - CXR today with grossly unchanged bibasilar hazy opacities and bilateral small pleural effusions (personally reviewed with Dr. Sifuentes)  - Obtain chest CT as able  - Defer bronch today, revisit prn     Immunosuppression: s/p induction  therapy with basiliximab 10/16 (and high dose IV steroid) and 10/20  - Tacrolimus 2 mg BID suspension (increased 10/28, fluconazole 10/26, recently held 10/23-10/25 due to supratherapeutic levels).  Goal level 8-12.  Level 11/2 therapeutic at 11.2, adjusted to SL administration (0.5 mg qAM / 1 mg qPM) given GI bleed, continue daily levels for now (ordered).  - MMF 1500 mg BID (on PTA, AZA to be avoided given TPMT) --> decrease to 1000 mg BID and adjusted to IV given GI bleed (ordered)  - Prednisone tapered to 15 mg qAM / 12.5 mg qPM, next taper due 11/7 (not yet ordered) --> adjust to IV methylprednisolone 12 mg qAM / 10 mg qPM given GI bleed (ordered)  Date AM dose (mg) PM dose (mg)   10/31/21 15 12.5   11/7/21 12.5 12.5   11/21/21 12.5 10   12/5/21 10 10   1/2/22 10 7.5   1/30/22 7.5 7.5   2/27/22 7.5 5   3/27/22 5 5   4/24/22 5 2.5      Prophylaxis:   - Bactrim for PJP ppx (10/25, held prior d/t BRENNAN) --> hold for now given Cr/potassium trend (ordered)  - Nystatin for oral candidiasis ppx, 6 month course  - See below for serologies and viral ppx:    Donor Recipient Intervention   CMV status Negative Negative None, CMV monthly (ordered 11/16)   EBV status Positive Positive None, EBV monthly (ordered 11/16)   HSV status N/A Positive Acyclovir POD #1-30   (recent infection history pre-txp)      ID: Concern for possible Strongyloides exposure pre-transplant s/p ivermectin x1 dose (9/17).  Donor and recipient cultures NGTD.  S/p IV ceftazidime/vancomycin for 48h per protocol and additional empiric ceftazidime 10/19-10/23 given recurrent fevers.  Bronch cultures 10/17 with Staph epi.  Cryptococcal Ag negative 10/23.    - Monthly Coccidioides Ag x12 months post-transplant per ID (urine and serum negative 10/17), due 11/17 (ordered)     Recurring fevers:  Fevers post-op, Tmax 101.7 POD #1.  Febrile with worsening leukocytosis again 10/25, generally persisting.  Trach sputum culture (10/25) with GPC, normal jean marie.   LP (10/29), xanthochromic with pleocytosis thought to be appropriate given RBC and WBCs, no ABX recommended per transplant ID and neurology.  Bronch culture (10/30) with GPC in clusters, normal jean marie.   - Repeat trach sputum culture (ordered)  - ABX: meropenem (10/28) for empiric coverage --> continue for now and revisit daily     Disseminated Candida: Noted on blood cultures 10/20 and 10/22.  BDG fungitell positive (399) on 10/20.  Respiratory cultures with persistent Candida albicans.  TC 10/23 without evidence of endocarditis.  Ophthalmology consult 10/24 with benign dilated fundoscopic exam.  Candida empyema also noted 10/25, chest tubes inadvertently removed by CVTS 10/28.  - Repeat blood cultures (10/23, 10/25) NGTD --> repeat (ordered)  - Fluconazole (10/26, prior micafungin 10/22-10/27), likely through 11/8 but final duration TBD pending clinical course  - Obtain chest CT (as above) to evaluate feasibility of chest tube placement given Candida empyema     HSV: Chronic intermittent active infection pre-transplant with recent HSV infection: crusted lesions throughout left side of jaw, s/p 10 day treatment course of ACV through 10/9.  HSV PCR blood negative 10/17.  - ACV ppx as above (started POD #1 instead of POD #8 given HSV history and location)     Hypogammaglobulinemia: IgG previously low at 364 (9/7).  Noted at 265 at time of transplant, s/p IVIG with premedication 10/21.    - Repeat IgG 11/17 (ordered)     Positive cross match: Note that he received two doses of rituximab in June, which is likely contributing to cross match result.  DSA negative 10/17, repeat DSA 10/23 invalid with recent IVIG (as above).  - Repeat DSA (11/1) pending, monitoring q2 weeks     PHS risk criteria donor:  Additional labs required post-transplant (between 4-8 weeks post-op): Hepatitis B, Hepatitis C, and HIV by VISHAL (ordered 11/15).     SALTY: Noted pre-transplant.  Home CPAP 6-12 cm H2O.  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  MRI (10/23) with multifocal subacute infarcts within both cerebral hemispheres and left cerebellum.  EEG without seizures 10/22, ammonia normal.  DDx include surgery v embolic v infectious (see above regarding ID work up).  Heparin drip started 10/23.   Repeat stroke code 10/25 with marked decrease in responsiveness with sedation wean.  Pupils not equal (3 mm R, 2 mm L with extropia) and gag reflex initially absent.  CTA head without obvious new pathology, MRI brain (with and without contrast) primarily revealing for infarct, low likelihood of PRES.  VEEG per neuro with severe diffuse encephalopathy.  Etiology of CVA likely 2/2 afib, PFO, or perioperative.  Some improvement noted since 10/29.  - Repeat ammonia   - Heparin management per neurology and primary team --> held 11/2 in setting of GI bleed as below  - ID workup and management as above  - Repeat head CT as able     Afib with RVR: Noted 10/18, started on amiodarone drip and transitioned to PO 10/21, dose decreased 10/29 (anticipate 4 week course).  Currently in SR.  Heparin drip (started 10/23) as above.      BRENNAN: Baseline creatinine 0.7.  BRENNAN noted POD #1, UO also downtrending.  Improved with aggressive diuresis, now worsening again (Cr 1.38 on 11/2) along with up trending BUN.  Nephrology consulted 11/1, thought to be prerenal.   - Management per nephrology and primary team     Elevated LFTs: Shock liver post-op.  Initial ALT//230 evening of surgery, then with marked increase to 1550/1246 POD #1.  Had improved although now with acute rise again likely in setting of shock.     GI bleed: Hemoglobin dropped to 6.6  "10/22, s/p 2 units pRBC.  OG tube with bloody output, EGD 10/23 noted NJ/OG tube trauma with scant oozing.  IV PPI BID.  Progressive hypotensive 11/2 requiring pressors, hemoglobin 5.8 with bloody G tube output.  Heparin drip held, transitioned to PPI drip, and MTP activated.   - IST adjusted to IV formulation as above  - Management per primary team, agree with GI consult/scope and abdominal CT as able    We appreciate the excellent care provided by the CVICU/CVTS teams.  Recommendations communicated via in person rounding and this note.  Will continue to follow along closely, please do not hesitate to call with any questions or concerns.    Patient seen and discussed with Dr. Sifuentes.    Keyla Rice PA-C  Inpatient PRINCESS  Pulmonary CF/Transplant     Subjective & Interval History:     Code blue called this morning due to bradycardia/asystole, required 1 round of CPR, no medications.  Then progressively hypotensive requiring pressors and found to have acute drop in hemoglobin with bloody output via G tube.  Prior to code, had wiggled toes per nursing.  On CMV 14/400/8/100, red streaked secretions via trach and bleeding noted around trach site yesterday.  Received chest physiotherapy QID yesterday.  Tmax 101.1 with increasing leukocytosis.     Review of Systems:     ROS as above otherwise significantly limited due to trach/encephalopathy     Physical Exam:     Vital signs:  Temp: 100  F (37.8  C) Temp src: Bladder BP: 94/63 Pulse: 99   Resp: 25 SpO2: 97 % O2 Device: Mechanical Ventilator Oxygen Delivery: 15 LPM Height: 162.6 cm (5' 4\") Weight: 69.4 kg (153 lb)  I/O:     Intake/Output Summary (Last 24 hours) at 11/2/2021 1343  Last data filed at 11/2/2021 1200  Gross per 24 hour   Intake 2798 ml   Output 2375 ml   Net 423 ml     Constitutional: Lying in bed.  HEENT: Eyes with pink conjunctivae, anicteric.  Oral mucosa moist without lesions.  Trach with dried blood on dressing.  PULM: Fair air flow bilaterally.  " Scattered coarse crackles.  No rhonchi, no wheezes.  Mildly-labored breathing on full mechanical ventilation.  CV: Normal S1 and S2.  RRR.  No peripheral edema.   ABD: Hypoactive bowel sounds, distended.    MSK: No apparent muscle wasting.   NEURO: Not responsive to verbal/physical stimuli.  SKIN: Warm, dry, fragile.  No rash on limited exam.   PSYCH: Calm.     Lines, Drains, and Devices:  Peripheral IV 10/25/21 Right;Anterior;Lateral Lower forearm (Active)   Site Assessment WDL 11/02/21 0400   Line Status Infusing;Checked every 1-2 hour 11/02/21 0400   Dressing Intervention Dressing reinforced 11/01/21 0400   Phlebitis Scale 0-->no symptoms 11/02/21 0400   Infiltration Scale 0 11/02/21 0400   Infiltration Site Treatment Method  None 11/02/21 0400   If infiltrated, was a vesicant infusing? No 10/30/21 0400   Number of days: 8       Peripheral IV 10/25/21 Anterior;Distal;Right Upper arm (Active)   Site Assessment WDL 11/02/21 0400   Line Status Checked every 1-2 hour;Infusing 11/02/21 0400   Dressing Intervention Dressing reinforced 11/01/21 0400   Phlebitis Scale 0-->no symptoms 11/02/21 0400   Infiltration Scale 0 11/02/21 0400   Infiltration Site Treatment Method  None 11/02/21 0400   If infiltrated, was a vesicant infusing? No 10/30/21 0400   Number of days: 8       Peripheral IV 11/02/21 Anterior;Distal;Right Lower forearm (Active)   Number of days: 0       Peripheral IV 11/02/21 Anterior;Right Upper forearm (Active)   Number of days: 0       Arterial Line 11/02/21 Radial (Active)   Number of days: 0       CVC Double Lumen Right Femoral (Active)   Number of days: 0     Data:     LABS    CMP:   Recent Labs   Lab 11/02/21  1305 11/02/21  1302 11/02/21  1214 11/02/21  1211 11/02/21  1051 11/02/21  1051 11/02/21  0930 11/02/21  0925 11/02/21  0906 11/02/21  0847 11/02/21  0832 11/02/21  0826 11/02/21  0815 11/02/21  0609 11/02/21  0556 11/01/21  0819 11/01/21  0818 10/31/21  0650 10/31/21  0610   NA  --  152*  --   153*  153*  --  154*  --   --  148*   < > 144   < > 153*   < > 146*   < > 149*   < > 146*   POTASSIUM  --  4.6  --  4.6  4.6  --  4.3  4.3  --  4.8 5.5*  5.5*   < > 6.6*   < > 4.7   < > 5.1   < > 4.7   < > 3.7   CHLORIDE  --  116*  --  117*  117*  --  118*  --   --  112*   < > 112*   < > 119*   < > 113*   < > 116*   < > 109   CO2  --  32  --  32  32  --  31  --   --  27   < > 18*   < > 22   < > 26   < > 28   < > 30   ANIONGAP  --  4  --  4  4  --  5  --   --  9   < > 14   < > 12   < > 7   < > 5   < > 7   * 152* 53* 56*  56*   < > 108*   < >  --  459*   < > 300*   < > 240*  225*   < > 140*   < > 146*   < > 114*   BUN  --  93*  --  95*  95*  --  93*  --   --  95*   < > 98*   < > 82*   < > 101*   < > 99*   < > 108*   CR  --  1.38*  --  1.30*  1.30*  --  1.23  --   --  1.36*   < > 1.47*   < > 1.17   < > 1.09   < > 1.08   < > 1.34*   GFRESTIMATED  --  57*  --  61  61  --  65  --   --  58*   < > 53*   < > 69   < > 75   < > 76   < > 59*   ERIK  --  9.7  --  10.0  10.0  --  9.4  --   --  9.2   < > 8.8   < > 7.2*   < > 9.3   < > 9.6   < > 9.2   MAG  --   --   --   --   --   --   --   --   --   --   --   --  1.9  --  2.2  --  2.2  --  2.2   PHOS  --   --   --   --   --   --   --   --   --   --   --   --  7.0*  --  3.1  --  2.7  --  4.0   PROTTOTAL  --   --   --  4.5*  --   --   --   --   --   --  4.4*  --  3.8*  --  5.3*   < > 6.2*   < > 5.7*   ALBUMIN  --   --   --  1.5*  --   --   --   --   --   --  1.4*  --  1.1*  --  1.6*   < > 1.8*   < > 1.6*   BILITOTAL  --   --   --  0.6  --   --   --   --   --   --  0.3  --  0.2  --  0.2   < > 0.2   < > 0.2   ALKPHOS  --   --   --  182*  --   --   --   --   --   --  139  --  110  --  150   < > 166*   < > 127   AST  --   --   --  450*  --   --   --   --   --   --  68*  --  33  --  39   < > 50*   < > 20   ALT  --   --   --  392*  --   --   --   --   --   --  72*  --  50  --  72*   < > 57   < > 33    < > = values in this interval not displayed.     CBC:   Recent Labs    Lab 11/02/21  1302 11/02/21  1053 11/02/21  0927 11/02/21  0908 11/02/21  0832 11/02/21  0832   WBC  --  57.4*  57.4* 48.0* 38.1*  --  49.7*   RBC  --  2.87* 2.43* 1.96*  --  2.09*   HGB 10.1* 8.8* 7.7* 6.0*   < > 6.5*   HCT  --  26.8* 23.8* 19.6*  --  22.3*   MCV  --  93 98 100  --  107*   MCH  --  30.7 31.7 30.6  --  31.1   MCHC  --  32.8 32.4 30.6*  --  29.1*   RDW  --  18.4* 19.9* 19.6*  --  17.9*   PLT  --  244 232 250  --  446    < > = values in this interval not displayed.       INR:   Recent Labs   Lab 11/02/21  1053 11/02/21  0908 11/02/21  0832 11/02/21  0556   INR 1.55* 1.56* 1.38* 1.18*       Glucose:   Recent Labs   Lab 11/02/21  1305 11/02/21  1302 11/02/21  1214 11/02/21  1211 11/02/21  1051 11/02/21  1050   * 152* 53* 56*  56* 108* 98       Blood Gas:   Recent Labs   Lab 11/02/21  1053 11/02/21  0906 11/02/21  0826 11/02/21  0815 11/02/21  0815   PHV  --   --   --   --  7.23*   PCO2V  --   --   --   --  53*   PO2V  --   --   --   --  25   HCO3V  --   --   --   --  22   LORI  --   --   --   --  -5.4   O2PER 70 100 100   < > 100    < > = values in this interval not displayed.       Culture Data No results for input(s): CULT in the last 168 hours.    Virology Data:   Lab Results   Component Value Date    FLUAH1 Negative 10/17/2021    FLUAH3 Negative 10/17/2021    XU3104 Negative 10/17/2021    IFLUB Negative 10/17/2021    RSVA Negative 10/17/2021    RSVB Negative 10/17/2021    PIV1 Negative 10/17/2021    PIV2 Negative 10/17/2021    PIV3 Negative 10/17/2021    HMPV Negative 10/17/2021    HRVS Negative 10/17/2021    ADVBE Negative 10/17/2021    ADVC Negative 10/17/2021       Historical CMV results (last 3 of prior testing):  Lab Results   Component Value Date    CMVQNT Not Detected 10/26/2021     No results found for: CMVLOG    Urine Studies    Recent Labs   Lab Test 11/01/21  1336 10/25/21  1507   URINEPH 5.5 5.5   NITRITE Negative Negative   LEUKEST Negative Negative   WBCU 0 2       Most  Recent [facility] Pulmonary Function Testing (FVC/FEV1 only)  No results found for: 20002  No results found for: 20003  No results found for: 20015  No results found for: 20016    IMAGING    Recent Results (from the past 48 hour(s))   XR Chest Port 1 View    Narrative    Exam: XR CHEST PORT 1 VIEW, 11/1/2021 1:05 AM    Indication: s/p lung transplant    Comparison: 10/31/2021    Findings:   Tracheostomy unchanged from prior. Cardiac silhouette is unchanged  from prior. Postsurgical changes of lung transplantation similar to  prior. Clamshell sternotomy wires. Small bilateral pleural effusions.  Slightly increased hazy retrocardiac airspace opacity. Right basilar  airspace opacities are unchanged.      Impression    Impression:   1. Slightly increased hazy retrocardiac airspace opacity. Hazy right  basilar airspace opacities are unchanged.  2. Small pleural effusions.    I have personally reviewed the examination and initial interpretation  and I agree with the findings.    KAMLA SHIN MD         SYSTEM ID:  [id number]   XR Chest Port 1 View    Narrative    Exam: XR CHEST PORT 1 VIEW, 11/2/2021 1:25 AM    Indication: s/p lung transplant    Comparison: 11/1/2021    Findings:   Postsurgical changes of lung transplant. Tracheostomy distal tip  projects over the high thoracic trachea. Small bilateral left greater  than right pleural effusions with hazy bibasilar airspace opacities.  No pneumothorax. No acute osseous abnormalities.      Impression    Impression: Stable exam. Small bilateral pleural effusions and hazy  bibasilar left greater than right airspace opacities, suggesting  atelectasis.    I have personally reviewed the examination and initial interpretation  and I agree with the findings.    MAURICIO NAVAS MD         SYSTEM ID:  [id number]   XR Chest Port 1 View    Impression    RESIDENT PRELIMINARY INTERPRETATION  Impression:   1. Grossly unchanged bibasilar hazy opacities and bilateral small  pleural  effusions.  2. Enlarged pulmonary artery shadow, likely representing pulmonary  hypertension.   XR Abdomen Port 1 View    Narrative    EXAM: XR ABDOMEN PORT 1 VIEWS 2021 10:03 AM     HISTORY: Code blue       COMPARISON:  Chest abdomen pelvis CT 10/25/2021    FINDINGS:   Supine radiograph of the abdomen. Percutaneous gastrojejunostomy tube  with tip overlying the left lower quadrant within the jejunum. Partial  visualization of right inferior approach central line. Partially  visualized Watson catheter. Nonobstructive bowel gas pattern. Scattered  prominent although not dilated loops of large and small bowel  throughout the abdomen. No pneumatosis or portal venous gas. Bilateral  pleural effusions. Left basilar opacities. No acute osseous  abnormality.        Impression    IMPRESSION:   1. Nonobstructive bowel gas pattern. No pneumatosis or portal venous  gas.  2. Percutaneous gastrojejunostomy tube tip projects over the proximal  jejunum.     I have personally reviewed the examination and initial interpretation  and I agree with the findings.    ROSS MAK MD         SYSTEM ID:  [id number]   Echo Limited    Narrative    [id number]  [id number]  [id number]  [id number]^BERTHA^EFREN     [facility],[place]  Echocardiography Laboratory  [address] [place] [id number]     Name: SHAYNA GUERRIER  MRN: [id number]  : 1965  Study Date: 2021 11:10 AM  Age: 56 yrs  Gender: Male  [facility]  Reason For Study: Shock  Ordering Physician: EFREN STONE  Referring Physician: SAUNDRA GILL  Performed By: Pamela Díaz RDCS     BSA: 1.7 m2  Height: 64 in  Weight: 152 lb  HR: 110  BP: 94/63 mmHg  ______________________________________________________________________________  Procedure  Limited Echocardiogram with portions of two-dimensional, color and spectral  Doppler  performed.  ______________________________________________________________________________  Interpretation Summary  Small biventricular size with hyperdynamic biventricular function.  The inferior vena cava was normal in size with preserved respiratory  variability.  No pericardial effusion is present.  ______________________________________________________________________________  Left Ventricle  Small left ventricular size with a hyperdynamic function.     Right Ventricle  Small right ventricular size with a hyperdynamic function.     Vessels  The inferior vena cava was normal in size with preserved respiratory  variability.     Pericardium  No pericardial effusion is present.     Miscellaneous  A left pleural effusion is present.  ______________________________________________________________________________  Report approved by: Jazmin Alonzo 11/02/2021 12:30 PM     ______________________________________________________________________________

## 2021-11-02 NOTE — PROGRESS NOTES
Brief GI Note  -EGD with large adherent gastric clot, unable to be displaced with irrigation, though not actively bleeding  -Small gastric erosion (also noted bleeding actively) clipped, though less likely this was contributing to significant bleeding  -Continue NPO/holding TF, plan for repeat EGD at bedside tomorrow  -If hemodynamically significant bleeding recurs prior, contact luminal team, tentative plan would be IV erythromycin 1-2 hours prior to repeating any urgent EGD to facilitate passage of hematin/residual blood clot in stomach  -GI will continue to follow    ILIR Saldaña CNP on 11/2/2021 at 1:12 PM    Discussed with GI staff Dr Ortiz

## 2021-11-02 NOTE — OR NURSING
Procedure: Upper Endoscopy with hemorrhage control. Placed 2 cook clips in gastric region  Tolerated: VS stable during and post procedure.   Procedure performed at bedside on 4E with staff RN providing sedation and monitoring patient during the entire exam.    Lexy Chao RN

## 2021-11-02 NOTE — PROGRESS NOTES
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 Corral. 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    OVERNIGHT EVENTS:  NAEO    TODAY'S PROGRESS:   -Code blue in morning  -Bleeding from g tube  -MTP activated - 3U prbc, 1 FFP  -Holding heparin  -protamine given  -PPI gtt  -GI consult for EGD  -repeat ammonia  - a line and femoral MAC line placed during code  - q1H labs until stabilized  - ct head/chest/abdomen/pelvis when stable      PLAN:  Neuro/ pain/ sedation:  Acute postoperative pain  Anxiety and depression  Acute to subacute CVA, b/l cerebral hemispheres and L cerebellum, suspect embolic  Encephalopathy and diffuse weakness  R ear lateral canal floor excoriation with bleeding     - Pain and sedation: none  - scheduled APAP, lido patch   - appreciate ENT, continue otowicks, floxin drops x5d, afrin prn   - appreciate neuro  - CT head once stable    Pulmonary care:   SALTY on home CPAP  Acute hypoxic respiratory failure s/p b/l lung transplant 10/16/21  S/p tracheostomy 10/29  Empyema with candida  - trachead, mechanically ventilated  - Levalbuterol nebs and Mucomyst, chest PT  - Daily CXR  - PST BID  - appreciate Pulmonary     Cardiovascular:    HTN  Afib   PFO  vasoplegic shock in setting of hypovlemia    - Monitor hemodynamic status.   - MAP goal <100, SBP <140  - Amlodipine 10 mg daily, coreg 25 BID  - prn labetalol  - rosuvastatin 10mg  - Amiodarone 200 mg daily   - low-intensity heparin gtt - hold in setting of gi bleed    GI care/ Nutrition:   Elevated LFTs - resolved   GI bleed 2/2 NG trauma per GI  GI bleed in setting of recent GJ tube placement  Moderate malnutrition in the context of acute on  chronic illness  - Diet: TF held  - imodium   -Bleeding from g tube -> GI consulted stat for endoscopy today  - PPI gtt    Renal/ Fluid Balance/ Electrolytes:   Acute kidney injury, recurrent, now with azotemia   Hypernatremia, improved with FWF  Hyperkalemia  BL creat appears to be ~ 0.7  - free water flushes 75 q4  -  hold diuresis today until d/w renal    - Strict I/O, daily weights  - Avoid/limit nephrotoxins as able  - Replete lytes PRN per protocol   - K 7.2 -> shifting patient with bicarb and insulin    Endocrine:    Stress induced hyperglycemia with steroid-induced component, on DM2  Hypothyroidism  RA  Preop A1c 6.6  - insulin gtt, lantus 40u   - Goal BG <180 for optimal healing  - continue home synthroid     ID/ Antibiotics:  Immunosuppression s/p transplant  Candidal infection of donor lungs (likely source), pleural fluid, and fungemia   - NGTD CSF. Last + blood cx 10/22. No vegetations on valves per TC 10/23  - continue fluconazole pending clinical course  - empiric meropenem x5d  - Nystatin, Acyclovir, bactrim   - Tacrolimus (via g tube), prednisone   - Follow cultures   - appreciate transplant ID, ophthalmology   - Continue to monitor fever curve, WBC and inflammatory markers as appropriate   - Panculture    Heme:     Acute blood loss anemia and anemia related to critical illness and iron deficiency  Hypogammaglobulinemia s/p IVIG  Thrombocytopenia, HIT negative - resolved   Lactic acidosis  - iron studies consistent with iron deficiency, will not treat for now given infection  -Active GI bleeding from g tube -> MTP activated  -Lactic acid 6.5 -> ~11 -> 3.5  - Hgb 8.2 -> 5.8 -> 8.8  - Trend lactic acid Q1H    MSK/ Skin:  Clamshell surgical incision  - Sternal precautions  - Postoperative incision management per protocol  - PT/OT/CR     Prophylaxis:    - Mechanical prophylaxis for DVT: SCDs  - Chemical DVT prophylaxis: holding  heparin gtt    - PPI for 30 days postoperatively     Lines/ tubes/  drains:  - trach  - peg-j  - Watson 10/25    - PIVs  - left radial a line  - right femoral MAC line    Disposition:  - CVICU     Patient seen, findings and plan discussed with CVICU staff.     Moises Ribera  PGY-3 Anesthesiology      ====================================    SUBJECTIVE:   NAEO.  Opens eyes to voice. Guppy breathing with PST on trach     OBJECTIVE:   1. VITAL SIGNS:   Temp:  [37.2  C (99  F)-38.4  C (101.1  F)] 37.3  C (99.1  F)  Pulse:  [] 103  Resp:  [20-31] 22  BP: ()/(57-86) 114/76  FiO2 (%):  [50 %] 50 %  SpO2:  [93 %-100 %] 100 %  Ventilation Mode: CMV/AC  (Continuous Mandatory Ventilation/ Assist Control)  FiO2 (%): 50 %  Rate Set (breaths/minute): 14 breaths/min  Tidal Volume Set (mL): 400 mL  PEEP (cm H2O): 8 cmH2O  Pressure Support (cm H2O): 10 cmH2O  Oxygen Concentration (%): 50 %  Resp: 22    2. INTAKE/ OUTPUT:   I/O last 3 completed shifts:  In: 3292.91 [I.V.:602.91; NG/GT:1110; IV Piggyback:500]  Out: 2360 [Urine:1555; Emesis/NG output:105; Stool:700]    3. PHYSICAL EXAMINATION:   General: critically ill  Neuro: opens and closes eyes and mouth, moving head. Moves BLE to noxious stimulus, no movement BUE to noxious    Resp: vented via trach, pressure supporting at present   CV: RRR on tele   Abdomen: nondistended  Incisions: c/d/i  Extremities: warm and well perfused, no edema    4. INVESTIGATIONS:   Arterial Blood Gases   Recent Labs   Lab 11/01/21  0412 10/31/21  0530 10/30/21  0531 10/29/21  0502   PH 7.55* 7.49* 7.52* 7.53*   PCO2 35 41 37 37   PO2 89 87 77* 97   HCO3 31* 31* 30* 31*     Complete Blood Count   Recent Labs   Lab 11/02/21  0556 11/01/21  0818 10/31/21  0610 10/30/21  0944   WBC 32.6* 28.4* 26.6* 37.5*   HGB 8.2* 9.6* 8.2* 8.5*    308 196 274     Basic Metabolic Panel  Recent Labs   Lab 11/02/21  0556 11/02/21  0338 11/02/21  0232 11/02/21  0119 11/01/21  0819 11/01/21  0818 10/31/21  1356 10/31/21  1349 10/31/21  0650 10/31/21  0610   *  --   --    --   --  149*  --  146*  --  146*   POTASSIUM 5.1  --   --   --   --  4.7  --  4.3  --  3.7   CHLORIDE 113*  --   --   --   --  116*  --  112*  --  109   CO2 26  --   --   --   --  28  --  28  --  30   *  --   --   --   --  99*  --  102*  --  108*   CR 1.09  --   --   --   --  1.08  --  1.13  --  1.34*   * 119* 115* 163*   < > 146*   < > 138*   < > 114*    < > = values in this interval not displayed.     Liver Function Tests  Recent Labs   Lab 11/02/21 0556 11/01/21  0818 10/31/21  0610 10/30/21  0503 10/29/21  0542 10/29/21  0542   AST 39 50* 20 17   < > 20   ALT 72* 57 33 29   < > 35   ALKPHOS 150 166* 127 108   < > 106   BILITOTAL 0.2 0.2 0.2 0.2   < > 0.3   ALBUMIN 1.6* 1.8* 1.6* 1.6*   < > 1.0*   INR  --  1.13 1.17* 1.10  --  1.13    < > = values in this interval not displayed.     Pancreatic Enzymes  No lab results found in last 7 days.  Coagulation Profile  Recent Labs   Lab 11/01/21  0818 10/31/21  0610 10/30/21  0503 10/29/21  0542   INR 1.13 1.17* 1.10 1.13        =========================================

## 2021-11-03 ENCOUNTER — APPOINTMENT (OUTPATIENT)
Dept: GENERAL RADIOLOGY | Facility: CLINIC | Age: 56
End: 2021-11-03
Attending: STUDENT IN AN ORGANIZED HEALTH CARE EDUCATION/TRAINING PROGRAM
Payer: COMMERCIAL

## 2021-11-03 ENCOUNTER — APPOINTMENT (OUTPATIENT)
Dept: INTERVENTIONAL RADIOLOGY/VASCULAR | Facility: CLINIC | Age: 56
End: 2021-11-03
Attending: PHYSICIAN ASSISTANT
Payer: COMMERCIAL

## 2021-11-03 LAB
ALBUMIN SERPL-MCNC: 1.8 G/DL (ref 3.4–5)
ALP SERPL-CCNC: 146 U/L (ref 40–150)
ALT SERPL W P-5'-P-CCNC: 356 U/L (ref 0–70)
AMMONIA PLAS-SCNC: 25 UMOL/L (ref 10–50)
ANION GAP SERPL CALCULATED.3IONS-SCNC: 3 MMOL/L (ref 3–14)
ANION GAP SERPL CALCULATED.3IONS-SCNC: 5 MMOL/L (ref 3–14)
ANION GAP SERPL CALCULATED.3IONS-SCNC: 6 MMOL/L (ref 3–14)
ANION GAP SERPL CALCULATED.3IONS-SCNC: 7 MMOL/L (ref 3–14)
ANION GAP SERPL CALCULATED.3IONS-SCNC: 7 MMOL/L (ref 3–14)
AST SERPL W P-5'-P-CCNC: 297 U/L (ref 0–45)
BACTERIA CSF CULT: NO GROWTH
BASE EXCESS BLDA CALC-SCNC: 2.1 MMOL/L (ref -9–1.8)
BASE EXCESS BLDA CALC-SCNC: 3.1 MMOL/L (ref -9–1.8)
BASE EXCESS BLDA CALC-SCNC: 6.2 MMOL/L (ref -9–1.8)
BASE EXCESS BLDA CALC-SCNC: 7.4 MMOL/L (ref -9–1.8)
BILIRUB SERPL-MCNC: 0.5 MG/DL (ref 0.2–1.3)
BUN SERPL-MCNC: 104 MG/DL (ref 7–30)
BUN SERPL-MCNC: 109 MG/DL (ref 7–30)
BUN SERPL-MCNC: 111 MG/DL (ref 7–30)
BUN SERPL-MCNC: 91 MG/DL (ref 7–30)
BUN SERPL-MCNC: 95 MG/DL (ref 7–30)
CALCIUM SERPL-MCNC: 9 MG/DL (ref 8.5–10.1)
CALCIUM SERPL-MCNC: 9.1 MG/DL (ref 8.5–10.1)
CALCIUM SERPL-MCNC: 9.1 MG/DL (ref 8.5–10.1)
CALCIUM SERPL-MCNC: 9.4 MG/DL (ref 8.5–10.1)
CALCIUM SERPL-MCNC: 9.7 MG/DL (ref 8.5–10.1)
CHLORIDE BLD-SCNC: 114 MMOL/L (ref 94–109)
CHLORIDE BLD-SCNC: 116 MMOL/L (ref 94–109)
CHLORIDE BLD-SCNC: 116 MMOL/L (ref 94–109)
CHLORIDE BLD-SCNC: 117 MMOL/L (ref 94–109)
CHLORIDE BLD-SCNC: 119 MMOL/L (ref 94–109)
CO2 SERPL-SCNC: 24 MMOL/L (ref 20–32)
CO2 SERPL-SCNC: 27 MMOL/L (ref 20–32)
CO2 SERPL-SCNC: 27 MMOL/L (ref 20–32)
CO2 SERPL-SCNC: 29 MMOL/L (ref 20–32)
CO2 SERPL-SCNC: 30 MMOL/L (ref 20–32)
CREAT SERPL-MCNC: 0.97 MG/DL (ref 0.66–1.25)
CREAT SERPL-MCNC: 1.11 MG/DL (ref 0.66–1.25)
CREAT SERPL-MCNC: 1.24 MG/DL (ref 0.66–1.25)
CREAT SERPL-MCNC: 1.27 MG/DL (ref 0.66–1.25)
CREAT SERPL-MCNC: 1.27 MG/DL (ref 0.66–1.25)
DONOR IDENTIFICATION: NORMAL
DSA COMMENTS: NORMAL
DSA PRESENT: NO
DSA TEST METHOD: NORMAL
ERYTHROCYTE [DISTWIDTH] IN BLOOD BY AUTOMATED COUNT: 19.6 % (ref 10–15)
ERYTHROCYTE [DISTWIDTH] IN BLOOD BY AUTOMATED COUNT: 19.6 % (ref 10–15)
GFR SERPL CREATININE-BSD FRML MDRD: 63 ML/MIN/1.73M2
GFR SERPL CREATININE-BSD FRML MDRD: 63 ML/MIN/1.73M2
GFR SERPL CREATININE-BSD FRML MDRD: 65 ML/MIN/1.73M2
GFR SERPL CREATININE-BSD FRML MDRD: 74 ML/MIN/1.73M2
GFR SERPL CREATININE-BSD FRML MDRD: 87 ML/MIN/1.73M2
GLUCOSE BLD-MCNC: 106 MG/DL (ref 70–99)
GLUCOSE BLD-MCNC: 138 MG/DL (ref 70–99)
GLUCOSE BLD-MCNC: 159 MG/DL (ref 70–99)
GLUCOSE BLD-MCNC: 160 MG/DL (ref 70–99)
GLUCOSE BLD-MCNC: 211 MG/DL (ref 70–99)
GLUCOSE BLDC GLUCOMTR-MCNC: 138 MG/DL (ref 70–99)
GLUCOSE BLDC GLUCOMTR-MCNC: 147 MG/DL (ref 70–99)
GLUCOSE BLDC GLUCOMTR-MCNC: 147 MG/DL (ref 70–99)
GLUCOSE BLDC GLUCOMTR-MCNC: 192 MG/DL (ref 70–99)
GRAM STAIN RESULT: NORMAL
GRAM STAIN RESULT: NORMAL
HCO3 BLD-SCNC: 26 MMOL/L (ref 21–28)
HCO3 BLD-SCNC: 27 MMOL/L (ref 21–28)
HCO3 BLD-SCNC: 30 MMOL/L (ref 21–28)
HCO3 BLD-SCNC: 30 MMOL/L (ref 21–28)
HCT VFR BLD AUTO: 28.1 % (ref 40–53)
HCT VFR BLD AUTO: 30.3 % (ref 40–53)
HGB BLD-MCNC: 10.1 G/DL (ref 13.3–17.7)
HGB BLD-MCNC: 9.2 G/DL (ref 13.3–17.7)
HGB BLD-MCNC: 9.6 G/DL (ref 13.3–17.7)
INR PPP: 1.36 (ref 0.85–1.15)
LACTATE SERPL-SCNC: 1.6 MMOL/L (ref 0.7–2)
LACTATE SERPL-SCNC: 1.6 MMOL/L (ref 0.7–2)
LACTATE SERPL-SCNC: 1.8 MMOL/L (ref 0.7–2)
LACTATE SERPL-SCNC: 1.8 MMOL/L (ref 0.7–2)
MAGNESIUM SERPL-MCNC: 2.1 MG/DL (ref 1.6–2.3)
MCH RBC QN AUTO: 30.2 PG (ref 26.5–33)
MCH RBC QN AUTO: 30.3 PG (ref 26.5–33)
MCHC RBC AUTO-ENTMCNC: 32.7 G/DL (ref 31.5–36.5)
MCHC RBC AUTO-ENTMCNC: 33.3 G/DL (ref 31.5–36.5)
MCV RBC AUTO: 91 FL (ref 78–100)
MCV RBC AUTO: 92 FL (ref 78–100)
O2/TOTAL GAS SETTING VFR VENT: 50 %
O2/TOTAL GAS SETTING VFR VENT: 60 %
O2/TOTAL GAS SETTING VFR VENT: 70 %
O2/TOTAL GAS SETTING VFR VENT: 70 %
ORGAN: NORMAL
PCO2 BLD: 35 MM HG (ref 35–45)
PCO2 BLD: 36 MM HG (ref 35–45)
PCO2 BLD: 39 MM HG (ref 35–45)
PCO2 BLD: 39 MM HG (ref 35–45)
PH BLD: 7.46 [PH] (ref 7.35–7.45)
PH BLD: 7.46 [PH] (ref 7.35–7.45)
PH BLD: 7.49 [PH] (ref 7.35–7.45)
PH BLD: 7.54 [PH] (ref 7.35–7.45)
PHOSPHATE SERPL-MCNC: 4.4 MG/DL (ref 2.5–4.5)
PLATELET # BLD AUTO: 156 10E3/UL (ref 150–450)
PLATELET # BLD AUTO: 159 10E3/UL (ref 150–450)
PO2 BLD: 108 MM HG (ref 80–105)
PO2 BLD: 117 MM HG (ref 80–105)
PO2 BLD: 141 MM HG (ref 80–105)
PO2 BLD: 75 MM HG (ref 80–105)
POTASSIUM BLD-SCNC: 4.1 MMOL/L (ref 3.4–5.3)
POTASSIUM BLD-SCNC: 4.4 MMOL/L (ref 3.4–5.3)
POTASSIUM BLD-SCNC: 4.4 MMOL/L (ref 3.4–5.3)
POTASSIUM BLD-SCNC: 4.8 MMOL/L (ref 3.4–5.3)
POTASSIUM BLD-SCNC: 5.2 MMOL/L (ref 3.4–5.3)
POTASSIUM BLD-SCNC: 5.3 MMOL/L (ref 3.4–5.3)
PROT SERPL-MCNC: 4.8 G/DL (ref 6.8–8.8)
RBC # BLD AUTO: 3.04 10E6/UL (ref 4.4–5.9)
RBC # BLD AUTO: 3.34 10E6/UL (ref 4.4–5.9)
SA 1 CELL: NORMAL
SA 1 TEST METHOD: NORMAL
SA 2 CELL: NORMAL
SA 2 TEST METHOD: NORMAL
SA1 HI RISK ABY: NORMAL
SA1 MOD RISK ABY: NORMAL
SA2 HI RISK ABY: NORMAL
SA2 MOD RISK ABY: NORMAL
SODIUM SERPL-SCNC: 148 MMOL/L (ref 133–144)
SODIUM SERPL-SCNC: 149 MMOL/L (ref 133–144)
SODIUM SERPL-SCNC: 150 MMOL/L (ref 133–144)
TACROLIMUS BLD-MCNC: 10.5 UG/L (ref 5–15)
TME LAST DOSE: NORMAL H
TME LAST DOSE: NORMAL H
UFH PPP CHRO-ACNC: <0.1 IU/ML
UNACCEPTABLE ANTIGENS: NORMAL
UNOS CPRA: 0
UPPER GI ENDOSCOPY: NORMAL
WBC # BLD AUTO: 26.9 10E3/UL (ref 4–11)
WBC # BLD AUTO: 35.1 10E3/UL (ref 4–11)
ZZZSA 1  COMMENTS: NORMAL
ZZZSA 2 COMMENTS: NORMAL

## 2021-11-03 PROCEDURE — 999N000157 HC STATISTIC RCP TIME EA 10 MIN

## 2021-11-03 PROCEDURE — 250N000013 HC RX MED GY IP 250 OP 250 PS 637: Performed by: THORACIC SURGERY (CARDIOTHORACIC VASCULAR SURGERY)

## 2021-11-03 PROCEDURE — 250N000013 HC RX MED GY IP 250 OP 250 PS 637: Performed by: STUDENT IN AN ORGANIZED HEALTH CARE EDUCATION/TRAINING PROGRAM

## 2021-11-03 PROCEDURE — 250N000009 HC RX 250

## 2021-11-03 PROCEDURE — 250N000011 HC RX IP 250 OP 636: Performed by: NURSE PRACTITIONER

## 2021-11-03 PROCEDURE — 99207 PR NON-BILLABLE SERV PER CHARTING: CPT | Mod: 24 | Performed by: PHYSICIAN ASSISTANT

## 2021-11-03 PROCEDURE — 258N000003 HC RX IP 258 OP 636: Performed by: NURSE PRACTITIONER

## 2021-11-03 PROCEDURE — C1729 CATH, DRAINAGE: HCPCS

## 2021-11-03 PROCEDURE — 43255 EGD CONTROL BLEEDING ANY: CPT | Performed by: INTERNAL MEDICINE

## 2021-11-03 PROCEDURE — 250N000009 HC RX 250: Performed by: PHYSICIAN ASSISTANT

## 2021-11-03 PROCEDURE — 200N000002 HC R&B ICU UMMC

## 2021-11-03 PROCEDURE — 32557 INSERT CATH PLEURA W/ IMAGE: CPT | Mod: LT | Performed by: PHYSICIAN ASSISTANT

## 2021-11-03 PROCEDURE — 32557 INSERT CATH PLEURA W/ IMAGE: CPT

## 2021-11-03 PROCEDURE — 87106 FUNGI IDENTIFICATION YEAST: CPT | Performed by: STUDENT IN AN ORGANIZED HEALTH CARE EDUCATION/TRAINING PROGRAM

## 2021-11-03 PROCEDURE — 94668 MNPJ CHEST WALL SBSQ: CPT

## 2021-11-03 PROCEDURE — 83605 ASSAY OF LACTIC ACID: CPT

## 2021-11-03 PROCEDURE — 88305 TISSUE EXAM BY PATHOLOGIST: CPT | Mod: TC

## 2021-11-03 PROCEDURE — C1769 GUIDE WIRE: HCPCS

## 2021-11-03 PROCEDURE — 85520 HEPARIN ASSAY: CPT | Performed by: THORACIC SURGERY (CARDIOTHORACIC VASCULAR SURGERY)

## 2021-11-03 PROCEDURE — 85027 COMPLETE CBC AUTOMATED: CPT

## 2021-11-03 PROCEDURE — 80053 COMPREHEN METABOLIC PANEL: CPT | Performed by: SURGERY

## 2021-11-03 PROCEDURE — 99233 SBSQ HOSP IP/OBS HIGH 50: CPT | Mod: 24 | Performed by: INTERNAL MEDICINE

## 2021-11-03 PROCEDURE — 85027 COMPLETE CBC AUTOMATED: CPT | Performed by: SURGERY

## 2021-11-03 PROCEDURE — 250N000013 HC RX MED GY IP 250 OP 250 PS 637: Performed by: INTERNAL MEDICINE

## 2021-11-03 PROCEDURE — 250N000013 HC RX MED GY IP 250 OP 250 PS 637

## 2021-11-03 PROCEDURE — 99233 SBSQ HOSP IP/OBS HIGH 50: CPT | Performed by: INTERNAL MEDICINE

## 2021-11-03 PROCEDURE — 84999 UNLISTED CHEMISTRY PROCEDURE: CPT | Performed by: THORACIC SURGERY (CARDIOTHORACIC VASCULAR SURGERY)

## 2021-11-03 PROCEDURE — 99291 CRITICAL CARE FIRST HOUR: CPT | Mod: 24 | Performed by: ANESTHESIOLOGY

## 2021-11-03 PROCEDURE — C9113 INJ PANTOPRAZOLE SODIUM, VIA: HCPCS | Performed by: NURSE PRACTITIONER

## 2021-11-03 PROCEDURE — 94003 VENT MGMT INPAT SUBQ DAY: CPT

## 2021-11-03 PROCEDURE — 82803 BLOOD GASES ANY COMBINATION: CPT | Performed by: ANESTHESIOLOGY

## 2021-11-03 PROCEDURE — 250N000011 HC RX IP 250 OP 636

## 2021-11-03 PROCEDURE — 87075 CULTR BACTERIA EXCEPT BLOOD: CPT | Performed by: STUDENT IN AN ORGANIZED HEALTH CARE EDUCATION/TRAINING PROGRAM

## 2021-11-03 PROCEDURE — 85610 PROTHROMBIN TIME: CPT | Performed by: STUDENT IN AN ORGANIZED HEALTH CARE EDUCATION/TRAINING PROGRAM

## 2021-11-03 PROCEDURE — 250N000012 HC RX MED GY IP 250 OP 636 PS 637: Performed by: PHYSICIAN ASSISTANT

## 2021-11-03 PROCEDURE — 71045 X-RAY EXAM CHEST 1 VIEW: CPT | Mod: 26 | Performed by: RADIOLOGY

## 2021-11-03 PROCEDURE — 88112 CYTOPATH CELL ENHANCE TECH: CPT | Mod: 26 | Performed by: PATHOLOGY

## 2021-11-03 PROCEDURE — 71045 X-RAY EXAM CHEST 1 VIEW: CPT

## 2021-11-03 PROCEDURE — 82140 ASSAY OF AMMONIA: CPT | Performed by: ANESTHESIOLOGY

## 2021-11-03 PROCEDURE — 999N000015 HC STATISTIC ARTERIAL MONITORING DAILY

## 2021-11-03 PROCEDURE — 87449 NOS EACH ORGANISM AG IA: CPT | Performed by: THORACIC SURGERY (CARDIOTHORACIC VASCULAR SURGERY)

## 2021-11-03 PROCEDURE — 250N000009 HC RX 250: Performed by: STUDENT IN AN ORGANIZED HEALTH CARE EDUCATION/TRAINING PROGRAM

## 2021-11-03 PROCEDURE — 85018 HEMOGLOBIN: CPT

## 2021-11-03 PROCEDURE — 84100 ASSAY OF PHOSPHORUS: CPT | Performed by: STUDENT IN AN ORGANIZED HEALTH CARE EDUCATION/TRAINING PROGRAM

## 2021-11-03 PROCEDURE — 84132 ASSAY OF SERUM POTASSIUM: CPT | Performed by: STUDENT IN AN ORGANIZED HEALTH CARE EDUCATION/TRAINING PROGRAM

## 2021-11-03 PROCEDURE — 94640 AIRWAY INHALATION TREATMENT: CPT | Mod: 76

## 2021-11-03 PROCEDURE — 83735 ASSAY OF MAGNESIUM: CPT | Performed by: STUDENT IN AN ORGANIZED HEALTH CARE EDUCATION/TRAINING PROGRAM

## 2021-11-03 PROCEDURE — 272N000502 HC NEEDLE CR3

## 2021-11-03 PROCEDURE — 80048 BASIC METABOLIC PNL TOTAL CA: CPT

## 2021-11-03 PROCEDURE — 88305 TISSUE EXAM BY PATHOLOGIST: CPT | Mod: 26 | Performed by: PATHOLOGY

## 2021-11-03 PROCEDURE — 80197 ASSAY OF TACROLIMUS: CPT | Performed by: STUDENT IN AN ORGANIZED HEALTH CARE EDUCATION/TRAINING PROGRAM

## 2021-11-03 PROCEDURE — 250N000011 HC RX IP 250 OP 636: Performed by: THORACIC SURGERY (CARDIOTHORACIC VASCULAR SURGERY)

## 2021-11-03 RX ORDER — FUROSEMIDE 10 MG/ML
20 INJECTION INTRAMUSCULAR; INTRAVENOUS ONCE
Status: COMPLETED | OUTPATIENT
Start: 2021-11-03 | End: 2021-11-03

## 2021-11-03 RX ORDER — ACETAMINOPHEN 325 MG/1
975 TABLET ORAL EVERY 8 HOURS PRN
Status: DISCONTINUED | OUTPATIENT
Start: 2021-11-03 | End: 2021-12-02

## 2021-11-03 RX ORDER — FENTANYL CITRATE 50 UG/ML
25 INJECTION, SOLUTION INTRAMUSCULAR; INTRAVENOUS EVERY 5 MIN PRN
Status: ACTIVE | OUTPATIENT
Start: 2021-11-04 | End: 2021-11-04

## 2021-11-03 RX ORDER — FENTANYL CITRATE 50 UG/ML
200 INJECTION, SOLUTION INTRAMUSCULAR; INTRAVENOUS
Status: COMPLETED | OUTPATIENT
Start: 2021-11-03 | End: 2021-11-03

## 2021-11-03 RX ORDER — LIDOCAINE HYDROCHLORIDE 10 MG/ML
INJECTION, SOLUTION EPIDURAL; INFILTRATION; INTRACAUDAL; PERINEURAL
Status: COMPLETED
Start: 2021-11-03 | End: 2021-11-03

## 2021-11-03 RX ORDER — SIMETHICONE 40MG/0.6ML
SUSPENSION, DROPS(FINAL DOSAGE FORM)(ML) ORAL PRN
Status: COMPLETED | OUTPATIENT
Start: 2021-11-03 | End: 2021-11-03

## 2021-11-03 RX ORDER — ACETAZOLAMIDE 250 MG/1
500 TABLET ORAL
Status: COMPLETED | OUTPATIENT
Start: 2021-11-03 | End: 2021-11-03

## 2021-11-03 RX ORDER — PREDNISOLONE SODIUM PHOSPHATE 15 MG/5ML
12.5 SOLUTION ORAL
Status: COMPLETED | OUTPATIENT
Start: 2021-11-07 | End: 2021-11-20

## 2021-11-03 RX ORDER — LIDOCAINE HYDROCHLORIDE 10 MG/ML
1-30 INJECTION, SOLUTION EPIDURAL; INFILTRATION; INTRACAUDAL; PERINEURAL
Status: DISCONTINUED | OUTPATIENT
Start: 2021-11-03 | End: 2021-11-03

## 2021-11-03 RX ADMIN — MYCOPHENOLATE MOFETIL 1000 MG: 200 POWDER, FOR SUSPENSION ORAL at 19:46

## 2021-11-03 RX ADMIN — LIDOCAINE 2 PATCH: 560 PATCH PERCUTANEOUS; TOPICAL; TRANSDERMAL at 09:56

## 2021-11-03 RX ADMIN — ACETYLCYSTEINE 2 ML: 200 SOLUTION ORAL; RESPIRATORY (INHALATION) at 12:23

## 2021-11-03 RX ADMIN — NYSTATIN 1000000 UNITS: 500000 SUSPENSION ORAL at 16:32

## 2021-11-03 RX ADMIN — ACETYLCYSTEINE 2 ML: 200 SOLUTION ORAL; RESPIRATORY (INHALATION) at 08:42

## 2021-11-03 RX ADMIN — NYSTATIN 1000000 UNITS: 500000 SUSPENSION ORAL at 09:53

## 2021-11-03 RX ADMIN — ACETYLCYSTEINE 2 ML: 200 SOLUTION ORAL; RESPIRATORY (INHALATION) at 20:37

## 2021-11-03 RX ADMIN — FLUCONAZOLE IN SODIUM CHLORIDE 400 MG: 2 INJECTION, SOLUTION INTRAVENOUS at 13:46

## 2021-11-03 RX ADMIN — FENTANYL CITRATE 150 MCG: 50 INJECTION INTRAMUSCULAR; INTRAVENOUS at 09:41

## 2021-11-03 RX ADMIN — PREDNISOLONE 12.5 MG: 15 SOLUTION ORAL at 19:46

## 2021-11-03 RX ADMIN — PREDNISOLONE 15 MG: 15 SOLUTION ORAL at 09:53

## 2021-11-03 RX ADMIN — FUROSEMIDE 20 MG: 10 INJECTION, SOLUTION INTRAMUSCULAR; INTRAVENOUS at 09:57

## 2021-11-03 RX ADMIN — MYCOPHENOLATE MOFETIL 1000 MG: 200 POWDER, FOR SUSPENSION ORAL at 09:49

## 2021-11-03 RX ADMIN — MULTIVIT AND MINERALS-FERROUS GLUCONATE 9 MG IRON/15 ML ORAL LIQUID 15 ML: at 09:56

## 2021-11-03 RX ADMIN — AMIODARONE HYDROCHLORIDE 200 MG: 200 TABLET ORAL at 09:56

## 2021-11-03 RX ADMIN — ACETAZOLAMIDE 500 MG: 250 TABLET ORAL at 09:50

## 2021-11-03 RX ADMIN — SIMETHICONE 133 MG: 20 SUSPENSION/ DROPS ORAL at 08:49

## 2021-11-03 RX ADMIN — Medication 12.5 MG: at 09:56

## 2021-11-03 RX ADMIN — ACETAMINOPHEN 975 MG: 325 TABLET, FILM COATED ORAL at 02:09

## 2021-11-03 RX ADMIN — SODIUM CHLORIDE 8 MG/HR: 9 INJECTION, SOLUTION INTRAVENOUS at 05:39

## 2021-11-03 RX ADMIN — LEVALBUTEROL HYDROCHLORIDE 1.25 MG: 1.25 SOLUTION RESPIRATORY (INHALATION) at 08:42

## 2021-11-03 RX ADMIN — LIDOCAINE HYDROCHLORIDE: 10 INJECTION, SOLUTION EPIDURAL; INFILTRATION; INTRACAUDAL; PERINEURAL at 16:31

## 2021-11-03 RX ADMIN — ACETAZOLAMIDE 500 MG: 250 TABLET ORAL at 18:44

## 2021-11-03 RX ADMIN — LEVOTHYROXINE SODIUM 25 MCG: 0.03 TABLET ORAL at 09:57

## 2021-11-03 RX ADMIN — ACYCLOVIR 400 MG: 200 SUSPENSION ORAL at 09:49

## 2021-11-03 RX ADMIN — CALCIUM CARBONATE 600 MG (1,500 MG)-VITAMIN D3 400 UNIT TABLET 1 TABLET: at 18:43

## 2021-11-03 RX ADMIN — LEVALBUTEROL HYDROCHLORIDE 1.25 MG: 1.25 SOLUTION RESPIRATORY (INHALATION) at 20:37

## 2021-11-03 RX ADMIN — TACROLIMUS 1 MG: 1 CAPSULE ORAL at 19:45

## 2021-11-03 RX ADMIN — Medication 12.5 MG: at 19:45

## 2021-11-03 RX ADMIN — ACETYLCYSTEINE 2 ML: 200 SOLUTION ORAL; RESPIRATORY (INHALATION) at 16:27

## 2021-11-03 RX ADMIN — TACROLIMUS 0.5 MG: 0.5 CAPSULE ORAL at 11:02

## 2021-11-03 RX ADMIN — ACYCLOVIR 400 MG: 200 SUSPENSION ORAL at 19:46

## 2021-11-03 RX ADMIN — NYSTATIN 1000000 UNITS: 500000 SUSPENSION ORAL at 19:45

## 2021-11-03 RX ADMIN — CALCIUM CARBONATE 600 MG (1,500 MG)-VITAMIN D3 400 UNIT TABLET 1 TABLET: at 09:56

## 2021-11-03 RX ADMIN — NYSTATIN 1000000 UNITS: 500000 SUSPENSION ORAL at 13:46

## 2021-11-03 RX ADMIN — LEVALBUTEROL HYDROCHLORIDE 1.25 MG: 1.25 SOLUTION RESPIRATORY (INHALATION) at 16:27

## 2021-11-03 RX ADMIN — LEVALBUTEROL HYDROCHLORIDE 1.25 MG: 1.25 SOLUTION RESPIRATORY (INHALATION) at 12:23

## 2021-11-03 RX ADMIN — LIDOCAINE HYDROCHLORIDE 8 ML: 10 INJECTION, SOLUTION EPIDURAL; INFILTRATION; INTRACAUDAL; PERINEURAL at 14:43

## 2021-11-03 RX ADMIN — MIDAZOLAM 3 MG: 1 INJECTION INTRAMUSCULAR; INTRAVENOUS at 09:42

## 2021-11-03 RX ADMIN — ACETAZOLAMIDE 500 MG: 250 TABLET ORAL at 13:46

## 2021-11-03 ASSESSMENT — ACTIVITIES OF DAILY LIVING (ADL)
ADLS_ACUITY_SCORE: 26
ADLS_ACUITY_SCORE: 22
ADLS_ACUITY_SCORE: 24
ADLS_ACUITY_SCORE: 26
ADLS_ACUITY_SCORE: 24
ADLS_ACUITY_SCORE: 26
ADLS_ACUITY_SCORE: 22
ADLS_ACUITY_SCORE: 22
ADLS_ACUITY_SCORE: 26
ADLS_ACUITY_SCORE: 24
ADLS_ACUITY_SCORE: 22
ADLS_ACUITY_SCORE: 24
ADLS_ACUITY_SCORE: 22
ADLS_ACUITY_SCORE: 24
ADLS_ACUITY_SCORE: 26
ADLS_ACUITY_SCORE: 22
ADLS_ACUITY_SCORE: 24
ADLS_ACUITY_SCORE: 26
ADLS_ACUITY_SCORE: 24
ADLS_ACUITY_SCORE: 24

## 2021-11-03 ASSESSMENT — MIFFLIN-ST. JEOR: SCORE: 1463

## 2021-11-03 NOTE — PROGRESS NOTES
Pulmonary Medicine  Cystic Fibrosis - Lung Transplant Team  Progress Note  2021       Patient: Edson Thornton  MRN: [id number]  : 1965 (age 56 year old)  Transplant: 10/16/2021 (Lung), POD#18  Admission date: 2021    Assessment & Plan:      Edson Thornton is a 56 year old male with a PMH significant for NSIP/ILD, bronchiectasis, moderate PH, RA, SALTY, chronic HSV infection, hypogammaglobulinemia, steroid-induced diabetes, hypothyroidism, PFO, HTN, HLD, duodenal anomaly, anxiety, and depression.  Admitted on 21 from OSH for acute on chronic respiratory failure 2/2 ILD exacerbation, now s/p BSLT on 10/16/21. Surgery relatively uncomplicated but pt. with low cardiac index and PGD immediately post-op.  Caroline weaned off 10/22, remains intubated.  Post-op course complicated by encephalopathy and diffuse weakness, acute to subacute CVA, afib with RVR, BRENNAN, GI bleed due to NJ/OG trauma, Candidemia/Candida empyema, and recurrent fevers.  Neuro status remains tenuous, although some improvement noted since 10/29.  Code called  due to bradycardia/asystole (required 1 round of CPR, no medications) and then progressively hypotension, noted to have acute drop in hemoglobin and bloody G tube output.  EGD  with large amount of clotted blood in stomach and area of raised mucosa with small adherent clot near PEG tube site that was clipped, no active bleeding.       Today's recommendations:   - CXR daily  - IR consult to evaluate for left and ?right (unsure if enough fluid) chest tube placement based on chest CT findings of pleural effusions and concern for empyema, obtain pleural cultures/studies  - Defer bronch today  - Tacrolimus level therapeutic (10h level), no dose adjustment, daily levels (ordered)  - Prednisolone taper due  (ordered)  - Continue to hold Bactrim due to Cr/potassium trend  - CMV and EBV ordered   - Coccidioides Ag ordered   - Follow pending trach sputum culture and  blood cultures (11/2)  - Continue fluconazole likely through 11/8 with final duration TBD pending clinical course  - IgG ordered 11/17  - DSA (11/1) pending  - Donor risk labs ordered 11/15  - Repeat EGD per GI     S/p BSLT for ILD:  Acute hypoxic respiratory failure s/p trach:   Pulmonary edema:  Bilateral pleural effusions: Unfortunately had not received vaccination for flu, PNA, or COVID-19 PTA.  Explant pathology with NSIP, no malignancy.  PGD 2-3.  Weaned off paralytic 10/19 (for vent dyssynchrony), pressors 10/21 (now hypotensive again as below), and Caroline 10/22.  Initial difficulty weaning sedation given agitation then with neurological findings as below.  CXR initially post-op with pulmonary edema, aggressively diuresed.  Recurring fevers post-op, see ID section below.  Bronch 10/20 with tan secretions to LLL, repeat 10/23 with frothy clear secretions in right TB tree.  Chest CT (10/25) with interval improvement in consolidative opacities to BLL and KARI with trace right hydroPTX (personally reviewed).  Bronch (10/26) with minimal sticky secretions, anastomosis intact, repeat (10/30) with thick secretions in the RUL and LLL and mild mucosal erythema.  Remains intubated and with persistent encephalopathy as below, underwent trach and PEG/J tube placement with thoracic surgery 10/29.  Code called 11/2 for bradycardia/asystole (required 1 round of CPR, no medications) then progressively hypotensive, GI bleed as below.  Chest CT 11/2 with increased bilateral pleural effusions (moderate left, small right), bibasilar atelectasis with area of hypoenhancing parenchyma in LLL (suspicious for infection), and numerous nondisplaced anterior rib fractures bilaterally.    - Nebs: levalbuterol and Mucomyst QID  - Aggressive pulmonary toilet with chest physiotherapy QID  - Ventilator management per ICU team  - Volume management per nephrology and ICU team  - CXR today with small bilateral pleural effusions with slightly  increased hazy bibasilar airspace opacities (personally reviewed with Dr. Sifuentes)  - IR consult to evaluate for left and ?right (unsure if enough fluid) chest tube placement based on chest CT findings of pleural effusions and concern for empyema, obtain pleural cultures/studies  - Defer bronch today, revisit prn     Immunosuppression: s/p induction therapy with basiliximab 10/16 (and high dose IV steroid) and 10/20  - Tacrolimus SL 0.5 mg qAM / 1 mg qPM (11/2, changed to SL given GI bleed).  Goal level 8-12.  Level 11/3 therapeutic at 10.5 (10h level), no dose adjustment, daily levels (ordered).  - MMF 1000 mg BID (11/2, decreased given GI bleed, AZA to be avoided given TPMT)  - Prednisolone 15 mg qAM / 12.5 mg qPM, next taper due 11/7 (ordered)  Date AM dose (mg) PM dose (mg)   10/31/21 15 12.5   11/7/21 12.5 12.5   11/21/21 12.5 10   12/5/21 10 10   1/2/22 10 7.5   1/30/22 7.5 7.5   2/27/22 7.5 5   3/27/22 5 5   4/24/22 5 2.5      Prophylaxis:   - Holding Bactrim (11/2, due to Cr/potassium trend) for PJP ppx   - Nystatin for oral candidiasis ppx, 6 month course  - See below for serologies and viral ppx:    Donor Recipient Intervention   CMV status Negative Negative None, CMV monthly (ordered 11/16)   EBV status Positive Positive None, EBV monthly (ordered 11/16)   HSV status N/A Positive Acyclovir POD #1-30   (recent infection history pre-txp)      ID: Concern for possible Strongyloides exposure pre-transplant s/p ivermectin x1 dose (9/17).  Donor and recipient cultures NGTD.  S/p IV ceftazidime/vancomycin for 48h per protocol and additional empiric ceftazidime 10/19-10/23 given recurrent fevers.  Bronch cultures 10/17 with Staph epi.  Cryptococcal Ag negative 10/23.    - Monthly Coccidioides Ag x12 months post-transplant per ID (urine and serum negative 10/17), due 11/17 (ordered)     Recurring fevers:  Fevers post-op, Tmax 101.7 POD #1.  Febrile with worsening leukocytosis again 10/25, generally persisting.   Trach sputum culture (10/25) with GPC, normal jean marie.  LP (10/29), xanthochromic with pleocytosis thought to be appropriate given RBC and WBCs, no ABX recommended per transplant ID and neurology.  Bronch culture (10/30) with GPC in clusters, normal jean marie.  S/p empiric meropenem 10/28-11/2.  - Trach sputum culture (11/2) with GPC, follow     Disseminated Candida: Noted on blood cultures 10/20 and 10/22.  BDG fungitell positive (399) on 10/20.  Respiratory cultures with persistent Candida albicans.  CT 10/23 without evidence of endocarditis.  Ophthalmology consult 10/24 with benign dilated fundoscopic exam.  Candida empyema also noted 10/25, chest tubes inadvertently removed by CVTS 10/28.  - Repeat blood cultures (10/23, 10/25, 11/2) NGTD   - Fluconazole (10/26, prior micafungin 10/22-10/27), likely through 11/8 but final duration TBD pending clinical course     HSV: Chronic intermittent active infection pre-transplant with recent HSV infection: crusted lesions throughout left side of jaw, s/p 10 day treatment course of ACV through 10/9.  HSV PCR blood negative 10/17.  - ACV ppx as above (started POD #1 instead of POD #8 given HSV history and location)     Hypogammaglobulinemia: IgG previously low at 364 (9/7).  Noted at 265 at time of transplant, s/p IVIG with premedication 10/21.    - Repeat IgG 11/17 (ordered)     Positive cross match: Note that he received two doses of rituximab in June, which is likely contributing to cross match result.  DSA negative 10/17, repeat DSA 10/23 invalid with recent IVIG (as above).  - Repeat DSA (11/1) pending, monitoring q2 weeks     PHS risk criteria donor:  Additional labs required post-transplant (between 4-8 weeks post-op): Hepatitis B, Hepatitis C, and HIV by VISHAL (ordered 11/15).     SALTY: Noted pre-transplant.  Home CPAP 6-12 cm H2O.  - Resume BiPAP at night post-extubation     Other relevant problems being managed by primary team:     Acute to subacute embolic  CVA:   Encephalopathy and diffuse weakness: Stroke code 10/22 d/t limited movement of BLE, CT head with infarcts in the bilateral cerebral hemispheres and left cerebella hemisphere (presumed embolic), no acute intracranial hemorrhage.  MRI (10/23) with multifocal subacute infarcts within both cerebral hemispheres and left cerebellum.  EEG without seizures 10/22, ammonia normal.  DDx include surgery v embolic v infectious (see above regarding ID work up).  Heparin drip started 10/23.   Repeat stroke code 10/25 with marked decrease in responsiveness with sedation wean.  Pupils not equal (3 mm R, 2 mm L with extropia) and gag reflex initially absent.  CTA head without obvious new pathology, MRI brain (with and without contrast) primarily revealing for infarct, low likelihood of PRES.  VEEG per neuro with severe diffuse encephalopathy.  Etiology of CVA likely 2/2 afib, PFO, or perioperative.  Some improvement noted since 10/29, repeat head CT 11/2 (following code) without new acute intracranial abnormalities.  - Heparin management per neurology and primary team, held since 11/2 due to GI bleed     Afib with RVR: Noted 10/18, started on amiodarone drip and transitioned to PO 10/21, dose decreased 10/29 (anticipate 4 week course).  Currently in SR.  Heparin drip as above.  Metoprolol resumed 11/3.     BRENNAN:   Hyperkalemia: Baseline creatinine 0.7.  BRENNAN noted POD #1, UO also downtrending.  Improved with aggressive diuresis, Cr worsened again (1.38 on 11/2) along with up trending BUN and hyperkalemia.  Nephrology consulted 11/1, thought to be prerenal/component of ATN.  - Holding Bactrim as above  - Management per nephrology and primary team     Recurrent GI bleed: Hemoglobin dropped to 6.6 10/22, s/p 2 units pRBC.  OG tube with bloody output, EGD 10/23 noted NJ/OG tube trauma with scant oozing.  IV PPI BID.  Progressive hypotensive 11/2, hemoglobin 5.8 with bloody G tube output.  Heparin drip held, transitioned to PPI drip,  "and MTP activated.  EGD 11/2 with large amount of clotted blood in stomach and area of raised mucosa with small adherent clot near PEG tube site that was clipped, no active bleeding.  Having maroon stools 11/3.  Abdominal CT 11/2 with distended stomach  - IST adjusted to IV formulation as above  - Management per GI and primary team, repeat EGD 11/3 per GI to further evaluate    Elevated LFTs: Shock liver post-op.  Initial ALT//230 evening of surgery, then with marked increase to 1550/1246 POD #1.  Had improved although now with acute rise again likely in setting of shock.    We appreciate the excellent care provided by the CVICU and CVTS teams.  Recommendations communicated via in person rounding and this note.  Will continue to follow along closely, please do not hesitate to call with any questions or concerns.    Patient seen and discussed with Dr. Sifuentes.    Keyla Rice PA-C  Inpatient PRINCESS  Pulmonary CF/Transplant     Subjective & Interval History:     Underwent EGD with GI yesterday, no active bleeding although noted large blood clot in stomach and also clipped raised mucosa near PEG tube site.  Having maroon stools early this morning, hemoglobin stable.  Off pressors.  Remains on CMV 16/400/10/70, scant secretions via trach.  TF remain on hold due to GI bleed.  Adequate urine output.  Afebrile this morning.    Review of Systems:     ROS as above otherwise significantly limited due to trach/encephalopathy    Physical Exam:     Vital signs:  Temp: 98.4  F (36.9  C) Temp src: Bladder   Pulse: 91   Resp: 20 SpO2: 97 % O2 Device: Mechanical Ventilator Oxygen Delivery: 15 LPM Height: 162.6 cm (5' 4\") Weight: 72.2 kg (159 lb 2.8 oz)  I/O:     Intake/Output Summary (Last 24 hours) at 11/3/2021 1049  Last data filed at 11/3/2021 1000  Gross per 24 hour   Intake 2344.99 ml   Output 1955 ml   Net 389.99 ml     Constitutional: Lying in bed, in no apparent distress.   HEENT: Eyes with pink conjunctivae, anicteric. "  Unable to visualize oral cavity due to patient participation.  Trach cdi.  PULM: Diminished air flow to bases bilaterally.  No crackles, no rhonchi, no wheezes.  Non-labored breathing on full vent support.  CV: Normal S1 and S2.  RRR.  No murmur, gallop, or rub.  No peripheral edema.   ABD: NABS, moderately distended.    MSK: No apparent muscle wasting.   NEURO: Opens eyes to voice, wiggled bilateral toes to command otherwise not following commands.  SKIN: Warm, dry, fragile.  No rash on limited exam.   PSYCH: Calm.     Lines, Drains, and Devices:  Peripheral IV 10/25/21 Right;Anterior;Lateral Lower forearm (Active)   Site Assessment WDL 11/03/21 0800   Line Status Infusing;Checked every 1 hour 11/03/21 0800   Dressing Intervention Dressing reinforced 11/01/21 0400   Phlebitis Scale 0-->no symptoms 11/03/21 0800   Infiltration Scale 0 11/03/21 0800   Infiltration Site Treatment Method  None 11/02/21 1600   If infiltrated, was a vesicant infusing? No 10/30/21 0400   Number of days: 9       Peripheral IV 10/25/21 Anterior;Distal;Right Upper arm (Active)   Site Assessment WDL 11/03/21 0800   Line Status Infusing;Checked every 1 hour 11/03/21 0800   Dressing Intervention Dressing reinforced 11/01/21 0400   Phlebitis Scale 0-->no symptoms 11/03/21 0800   Infiltration Scale 0 11/03/21 0800   Infiltration Site Treatment Method  None 11/02/21 0400   If infiltrated, was a vesicant infusing? No 10/30/21 0400   Number of days: 9       Peripheral IV 11/02/21 Anterior;Distal;Right Lower forearm (Active)   Site Assessment WDL 11/03/21 0800   Line Status Infusing;Checked every 1 hour 11/03/21 0800   Phlebitis Scale 0-->no symptoms 11/03/21 0800   Infiltration Scale 0 11/03/21 0800   Infiltration Site Treatment Method  None 11/03/21 0800   Number of days: 1       Peripheral IV 11/02/21 Anterior;Right Upper forearm (Active)   Site Assessment WDL 11/03/21 0800   Line Status Infusing;Checked every 1-2 hour 11/03/21 0400   Phlebitis  Scale 0-->no symptoms 11/03/21 0400   Infiltration Scale 0 11/03/21 0400   Infiltration Site Treatment Method  None 11/02/21 1600   Number of days: 1       Arterial Line 11/02/21 Radial (Active)   Site Assessment WDL 11/03/21 0800   Line Status Pulsatile blood flow 11/03/21 0800   Arterine Line Cap Change Due 11/05/21 11/03/21 0800   Art Line Waveform Appropriate 11/03/21 0800   Art Line Interventions Connections checked and tightened;Flushed per protocol;Zeroed and calibrated 11/03/21 0800   Color/Movement/Sensation Capillary refill less than 3 sec 11/03/21 0800   Line Necessity Yes, meets criteria 11/03/21 0800   Dressing Type Transparent 11/03/21 0800   Dressing Status Clean, dry, intact 11/03/21 0800   Dressing Intervention Dressing changed/new dressing 11/02/21 1700   Dressing Change Due 11/07/21 11/03/21 0800   Number of days: 1       CVC Double Lumen Right Femoral (Active)   Site Assessment WDL 11/03/21 0800   Dressing Type Chlorhexidine sponge;Transparent 11/03/21 0800   Dressing Status clean;dry;intact 11/03/21 0800   Dressing Intervention new dressing 11/02/21 1200   Dressing Change Due 11/07/21 11/03/21 0800   Line Necessity yes, meets criteria 11/03/21 0800   Brown - Status saline locked 11/03/21 0800   Brown - Cap Change Due 11/05/21 11/03/21 0800   White - Status saline locked 11/03/21 0800   White - Cap Change Due 11/05/21 11/03/21 0800   Phlebitis Scale 0-->no symptoms 11/03/21 0800   Infiltration? no 11/03/21 0800   Infiltration Scale 0 11/03/21 0800   CVC Comment CDI 11/03/21 0800   Number of days: 1     Data:     LABS    CMP:   Recent Labs   Lab 11/03/21  1022 11/03/21  0411 11/03/21  0407 11/03/21  0208 11/02/21  2205 11/02/21  1609 11/02/21  1400 11/02/21  1305 11/02/21  1302 11/02/21  1214 11/02/21  1211 11/02/21  0847 11/02/21  0832 11/02/21  0826 11/02/21  0815 11/02/21  0609 11/02/21  0556 11/01/21  0819 11/01/21  0818   NA  --   --  148*  --  148*  --  150*  --  152*   < > 153*  153*   < >  144   < > 153*   < > 146*   < > 149*   POTASSIUM  --   --  5.3 5.2 5.4*  --  4.4   < > 4.6   < > 4.6  4.6   < > 6.6*   < > 4.7   < > 5.1   < > 4.7   CHLORIDE  --   --  116*  --  116*  --  115*  --  116*   < > 117*  117*   < > 112*   < > 119*   < > 113*   < > 116*   CO2  --   --  27  --  29  --  32  --  32   < > 32  32   < > 18*   < > 22   < > 26   < > 28   ANIONGAP  --   --  5  --  3  --  3  --  4   < > 4  4   < > 14   < > 12   < > 7   < > 5   * 138* 138*  --  167*   < > 121*  111*   < > 152*   < > 56*  56*   < > 300*   < > 240*  225*   < > 140*   < > 146*   BUN  --   --  109*  --  105*  --  97*  --  93*   < > 95*  95*   < > 98*   < > 82*   < > 101*   < > 99*   CR  --   --  1.27*  --  1.22  --  1.30*  --  1.38*   < > 1.30*  1.30*   < > 1.47*   < > 1.17   < > 1.09   < > 1.08   GFRESTIMATED  --   --  63  --  66  --  61  --  57*   < > 61  61   < > 53*   < > 69   < > 75   < > 76   ERIK  --   --  9.4  --  9.2  --  9.4  --  9.7   < > 10.0  10.0   < > 8.8   < > 7.2*   < > 9.3   < > 9.6   MAG  --   --  2.1  --   --   --   --   --   --   --   --   --   --   --  1.9  --  2.2  --  2.2   PHOS  --   --  4.4  --   --   --   --   --   --   --   --   --   --   --  7.0*  --  3.1  --  2.7   PROTTOTAL  --   --  4.8*  --   --   --   --   --   --   --  4.5*  --  4.4*  --  3.8*   < > 5.3*   < > 6.2*   ALBUMIN  --   --  1.8*  --   --   --   --   --   --   --  1.5*  --  1.4*  --  1.1*   < > 1.6*   < > 1.8*   BILITOTAL  --   --  0.5  --   --   --   --   --   --   --  0.6  --  0.3  --  0.2   < > 0.2   < > 0.2   ALKPHOS  --   --  146  --   --   --   --   --   --   --  182*  --  139  --  110   < > 150   < > 166*   AST  --   --  297*  --   --   --   --   --   --   --  450*  --  68*  --  33   < > 39   < > 50*   ALT  --   --  356*  --   --   --   --   --   --   --  392*  --  72*  --  50   < > 72*   < > 57    < > = values in this interval not displayed.     CBC:   Recent Labs   Lab 11/03/21  1023 11/03/21  0407 11/02/21  3408  11/02/21  1607 11/02/21  1302 11/02/21  1053   WBC  --  35.1* 36.4* 40.1*  --  57.4*  57.4*   RBC  --  3.34* 3.32* 3.44*  --  2.87*   HGB 9.6* 10.1* 10.1* 10.5*   < > 8.8*   HCT  --  30.3* 30.1* 31.3*  --  26.8*   MCV  --  91 91 91  --  93   MCH  --  30.2 30.4 30.5  --  30.7   MCHC  --  33.3 33.6 33.5  --  32.8   RDW  --  19.6* 18.7* 17.7*  --  18.4*   PLT  --  159 151 131*  --  244    < > = values in this interval not displayed.       INR:   Recent Labs   Lab 11/03/21  0407 11/02/21  1053 11/02/21  0908 11/02/21  0832   INR 1.36* 1.55* 1.56* 1.38*       Glucose:   Recent Labs   Lab 11/03/21  1022 11/03/21  0411 11/03/21  0407 11/02/21  2205 11/02/21 2015 11/02/21  1909   * 138* 138* 167* 165* 157*       Blood Gas:   Recent Labs   Lab 11/03/21  1024 11/03/21  0407 11/02/21  2206 11/02/21  0826 11/02/21  0815   PHV  --   --   --   --  7.23*   PCO2V  --   --   --   --  53*   PO2V  --   --   --   --  25   HCO3V  --   --   --   --  22   LORI  --   --   --   --  -5.4   O2PER 60 70 80   < > 100    < > = values in this interval not displayed.       Culture Data No results for input(s): CULT in the last 168 hours.    Virology Data:   Lab Results   Component Value Date    FLUAH1 Negative 10/17/2021    FLUAH3 Negative 10/17/2021    [id number] Negative 10/17/2021    IFLUB Negative 10/17/2021    RSVA Negative 10/17/2021    RSVB Negative 10/17/2021    PIV1 Negative 10/17/2021    PIV2 Negative 10/17/2021    PIV3 Negative 10/17/2021    HMPV Negative 10/17/2021    HRVS Negative 10/17/2021    ADVBE Negative 10/17/2021    ADVC Negative 10/17/2021       Historical CMV results (last 3 of prior testing):  Lab Results   Component Value Date    CMVQNT Not Detected 10/26/2021     No results found for: CMVLOG    Urine Studies    Recent Labs   Lab Test 11/01/21  1336 10/25/21  1507   URINEPH 5.5 5.5   NITRITE Negative Negative   LEUKEST Negative Negative   WBCU 0 2       Most Recent [facility] Pulmonary Function Testing (FVC/FEV1 only)  No  results found for: 20002  No results found for: 20003  No results found for: 20015  No results found for: 20016    IMAGING    Recent Results (from the past 48 hour(s))   XR Chest Port 1 View    Narrative    Exam: XR CHEST PORT 1 VIEW, 11/2/2021 1:25 AM    Indication: s/p lung transplant    Comparison: 11/1/2021    Findings:   Postsurgical changes of lung transplant. Tracheostomy distal tip  projects over the high thoracic trachea. Small bilateral left greater  than right pleural effusions with hazy bibasilar airspace opacities.  No pneumothorax. No acute osseous abnormalities.      Impression    Impression: Stable exam. Small bilateral pleural effusions and hazy  bibasilar left greater than right airspace opacities, suggesting  atelectasis.    I have personally reviewed the examination and initial interpretation  and I agree with the findings.    MAURICIO NAVAS MD         SYSTEM ID:  [id number]   XR Chest Port 1 View    Narrative    Exam: XR CHEST PORT 1 VIEW, 11/2/2021 10:02 AM    Indication: code blue    Comparison: Same-day radiograph 1:23 AM    Findings:   Single portable supine AP view of the chest. Patient is rotated to the  left. Post surgical changes of bilateral lung transplant with  clamshell sternotomy sutures, mediastinal surgical clips, and skin  surgical staples. Tracheostomy. Midline trachea. No pneumothorax.  Bilateral small pleural effusions, unchanged from prior. Stable hazy  bibasilar airspace opacities. Stable cardiac silhouette. No acute  osseous abnormalities.      Impression    Impression:   Grossly unchanged bibasilar hazy opacities and bilateral small pleural  effusions.    I have personally reviewed the examination and initial interpretation  and I agree with the findings.    DAVIN ANGUIANO MD         SYSTEM ID:  [id number]   XR Abdomen Port 1 View    Narrative    EXAM: XR ABDOMEN PORT 1 VIEWS 11/2/2021 10:03 AM     HISTORY: Code blue       COMPARISON:  Chest abdomen pelvis CT  10/25/2021    FINDINGS:   Supine radiograph of the abdomen. Percutaneous gastrojejunostomy tube  with tip overlying the left lower quadrant within the jejunum. Partial  visualization of right inferior approach central line. Partially  visualized Watson catheter. Nonobstructive bowel gas pattern. Scattered  prominent although not dilated loops of large and small bowel  throughout the abdomen. No pneumatosis or portal venous gas. Bilateral  pleural effusions. Left basilar opacities. No acute osseous  abnormality.        Impression    IMPRESSION:   1. Nonobstructive bowel gas pattern. No pneumatosis or portal venous  gas.  2. Percutaneous gastrojejunostomy tube tip projects over the proximal  jejunum.     I have personally reviewed the examination and initial interpretation  and I agree with the findings.    ROSS MAK MD         SYSTEM ID:  [id number]   Echo Limited    Narrative    [id number]  [id number]  [id number]  [id number]^BERTHA^EFREN     [facility],[place]  Echocardiography Laboratory  [address] [place] [id number]     Name: SHAYNA GUERRIER  MRN: [id number]  : 1965  Study Date: 2021 11:10 AM  Age: 56 yrs  Gender: Male  Patient Location: [facility]  Reason For Study: Shock  Ordering Physician: EFREN STONE  Referring Physician: SAUNDRA GILL  Performed By: Pamela Díaz RDCS     BSA: 1.7 m2  Height: 64 in  Weight: 152 lb  HR: 110  BP: 94/63 mmHg  ______________________________________________________________________________  Procedure  Limited Echocardiogram with portions of two-dimensional, color and spectral  Doppler performed.  ______________________________________________________________________________  Interpretation Summary  Small biventricular size with hyperdynamic biventricular function.  The inferior vena cava was normal in size with preserved respiratory  variability.  No pericardial effusion is  present.  ______________________________________________________________________________  Left Ventricle  Small left ventricular size with a hyperdynamic function.     Right Ventricle  Small right ventricular size with a hyperdynamic function.     Vessels  The inferior vena cava was normal in size with preserved respiratory  variability.     Pericardium  No pericardial effusion is present.     Miscellaneous  A left pleural effusion is present.  ______________________________________________________________________________  Report approved by: Jazmin Alonzo 11/02/2021 12:30 PM     ______________________________________________________________________________      CT Head w/o Contrast    Narrative    CT HEAD W/O CONTRAST 11/2/2021 1:43 PM    History: Anoxic brain damage     Comparison: MR brain 10/26/2021 and CT head 10/25/2021    Technique: Using multidetector thin collimation helical acquisition  technique, axial, coronal and sagittal CT images from the skull base  to the vertex were obtained without intravenous contrast.   (topogram) image(s) also obtained and reviewed.    Findings:   Redemonstration of hypodense subacute infarcts of the left occipital  lobe, right occipital lobe, and bilateral parietal lobes. Scattered  gyral hyperdensity corresponding to the effected regions, consistent  with cortical laminar necrosis. No new area of infarct. No  superimposed parenchymal hemorrhage. Mild generalized cerebral volume  loss. No mass effect or midline shift. Ventricles are  proportionate/cerebral sulci. Basal cisterns are clear.     The bony calvaria and the bones of the skull base are normal.  Bilateral mastoid air cell effusions, likely related to intubation.  Visualized portions of the paranasal sinuses are clear. Orbits  unremarkable. Partially visualized emphysema in the parapharyngeal  spaces, likely related to recent tracheostomy.         Impression    Impression: No new acute intracranial  abnormalities. Scattered  subacute multifocal infarctions in both cerebral hemispheres, in  similar distribution to prior MRI. No CT evidence for new focal  cortical hypoattenuations to suggest acute infarction or new areas of  anoxic brain damage. No intracranial hemorrhage.    I have personally reviewed the examination and initial interpretation  and I agree with the findings.    SUZI HOLDEN MD         SYSTEM ID:  [id number]   CT Chest/Abdomen/Pelvis w Contrast    Narrative    EXAMINATION: CT CHEST/ABDOMEN/PELVIS W CONTRAST 11/2/2021 1:46 PM    TECHNIQUE: Helical CT images from the thoracic inlet through the  symphysis pubis were obtained with intravenous contrast. Contrast  dose: Iopamidol (ISOVUE-370) solution 100 mL    COMPARISON: CT 10/25/2021    HISTORY: GI bleed    FINDINGS:    Chest:  Heart/ Mediastinum: Heart is within normal limits. Atherosclerotic  calcifications of the aorta and coronary arteries. Small pericardial  effusion. No bulky lymphadenopathy. Esophagus appears normal. Foci of  gas in the lower neck likely from tracheostomy placement. Small  filling defect in the lower right internal jugular vein (series 21,  image 8), decreased in size since 10/25/2021.    Lungs/pleura: The tracheostomy tube tip is approximately 5 cm above  the shiv. There is fluid density filling in the superior trachea  above the tracheostomy tube. Postsurgical changes of bilateral lung  transplant. There is increased moderate left pleural effusion and  increased small right pleural effusion. Bibasilar opacities, which are  predominantly homogeneously enhancing although there is a region  within the left lower lobe which is hypoenhancing. No pneumothorax.    Chest wall/axilla: Sternotomy wires. No lymphadenopathy.    Abdomen and Pelvis:    Liver: Unremarkable. No suspicious masses. No hepatic steatosis.     Gallbladder/biliary tree: No gallstones. No biliary dilatation.    Spleen: Unremarkable.    Pancreas: Unremarkable. No  mass or ductal dilatation.    Adrenal glands: Unremarkable.    Kidneys: Unremarkable. No hydronephrosis.    Bowel: Percutaneous gastrojejunostomy tube has been placed with tip in  proximal jejunum. Heterogeneously hyperdense fluid within the  distended stomach. No definite extravasation identified on  postcontrast images. Metallic clips in the stomach and duodenum,  within a small periampullary duodenal diverticulum. Sigmoid  diverticulosis without evidence of acute diverticulitis. Small bowel  is dilated up to 3 cm without definite transition point. Rectal tube  is in place.    Retroperitoneum: Vasculature is grossly unremarkable. No bulky  lymphadenopathy.    Pelvis: Watson catheter is present within the urinary bladder. There is  a small amount of air within the urinary bladder, as well as a single  punctate focus of air within the mid right kidney. Prostate and  seminal vesicles are within normal limits. Right femoral venous  catheter with the tip positioned in the right external iliac vein.    Bones: Unremarkable. No suspicious lesions.    Soft Tissues: Small ill-defined fluid collections deep to the  pectoralis major muscles bilaterally (series 21, image 123),  increased/new compared to the previous CT. This measures up to 5.8 x  2.4 cm on the right. Numerous nondisplaced anterior rib fractures  bilaterally.        Impression    IMPRESSION:  1.  The stomach is distended with hyperdense fluid suggestive of blood  products. No contrast extravasation in the gastrointestinal tract to  indicate ongoing hemorrhage.   2.  Increased bilateral pleural effusions compared to CT 10/25/2021,  moderate on the left, small on the right. Bibasilar atelectasis with  an area of hypoenhancing parenchyma in the left lower lobe, suspicious  for infection.  3.  Dilated small bowel without definite transition point suggestive  of ileus.  4.  Decreased nonocclusive thrombus in the right internal jugular  vein.  5.  Numerous  nondisplaced anterior rib fractures bilaterally. Small  fluid collections deep to the pectoralis major muscles bilaterally,  increased/new compared to the previous CT and likely representing  small seromas/hematomas.       I have personally reviewed the examination and initial interpretation  and I agree with the findings.    ROSS MAK MD         SYSTEM ID:  [id number]   XR Chest Port 1 View    Narrative    Exam: XR CHEST PORT 1 VIEW, 11/3/2021 1:05 AM    Indication: s/p lung transplant    Comparison: 11/2/2021    Findings:   Tracheostomy distal tip projects over the high thoracic trachea.  Surgical changes of lung transplant with clamshell sternotomy wires.  Cardiomediastinal silhouette is prominent similar to prior. Small  bilateral pleural effusions similar to prior. Slightly increased hazy  bibasilar opacities. No pneumothorax. No acute osseous abnormality is.      Impression    Impression: Small bilateral pleural effusions with slightly increased  hazy bibasilar airspace opacities.    I have personally reviewed the examination and initial interpretation  and I agree with the findings.    FITO PERALES MD         SYSTEM ID:  [id number]

## 2021-11-03 NOTE — OR NURSING
Pt had EGD with clip x 2 under moderate sedation. Sedation administered and documented by bedside RN Nick. Procedural report given to Nick. Pt stable at completion of procedure.

## 2021-11-03 NOTE — CONSULTS
Interventional Radiology Consult Service Note  21     Patient is on IR schedule 21 for a IR NON tunneled chest tube placement-LEFT and if there is enough fluid on the right to place RIGHT chest tube.    Labs WNL for procedure. COVID neg.   No NPO required.  Medications to be held include: IV heparin has been turned off for EGD and will remain off for this procedure.    Consent will be done prior to procedure via wife, Peg Ruegge [phone] per team.      Please contact the IR charge RN at [phone] for estimated time of procedure.     Case discussed with IR attending Dr. Blade Torrez.     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      D/w Dr. Allan and pt previously had bilateral chest tubes removed 10/28 now with fluid reaccummulation.  If there is enough fluid on right to place right chest tube as well.      There is concern for GIB and pt is getting EGD now at bedside for which IV heparin has been turned off and will remain off for this procedure.      Pertinent Imagin2021:  CT chest AP:  IMPRESSION:  1.  The stomach is distended with hyperdense fluid suggestive of blood  products. No contrast extravasation in the gastrointestinal tract to  indicate ongoing hemorrhage.   2.  Increased bilateral pleural effusions compared to CT 10/25/2021,  moderate on the left, small on the right. Bibasilar atelectasis with  an area of hypoenhancing parenchyma in the left lower lobe, suspicious  for infection.  3.  Dilated small bowel without definite transition point  "suggestive  of ileus.  4.  Decreased nonocclusive thrombus in the right internal jugular  vein.  5.  Numerous nondisplaced anterior rib fractures bilaterally. Small  fluid collections deep to the pectoralis major muscles bilaterally,  increased/new compared to the previous CT and likely representing  small seromas/hematomas.     Expected date of discharge: TBD    Vitals:   BP 94/63   Pulse 80   Temp 98.4  F (36.9  C)   Resp 30   Ht 1.626 m (5' 4\")   Wt 72.2 kg (159 lb 2.8 oz)   SpO2 100%   BMI 27.32 kg/m      Pertinent Labs:     Lab Results   Component Value Date    WBC 35.1 (H) 11/03/2021    WBC 36.4 (H) 11/02/2021    WBC 40.1 (H) 11/02/2021     Lab Results   Component Value Date    HGB 10.1 11/03/2021    HGB 10.1 11/02/2021    HGB 10.5 11/02/2021     Lab Results   Component Value Date     11/03/2021     11/02/2021     11/02/2021     Lab Results   Component Value Date    INR 1.36 (H) 11/03/2021    PTT 34 11/02/2021     Lab Results   Component Value Date    POTASSIUM 5.3 11/03/2021    POTASSIUM 4.8 11/02/2021      No results found for: HCGQUANT    COVID-19 Antibody Results, Testing for Immunity    COVID-19 Antibody Results, Testing for Immunity   No data to display.         COVID-19 PCR Results    COVID-19 PCR Results 8/30/21 9/6/21 9/15/21 9/23/21 10/1/21 10/8/21 10/15/21 10/17/21 10/24/21 11/1/21   COVID-19 Virus by PCR (External Result) Not Detected            SARS CoV2 PCR  Negative Negative Negative Negative Negative Negative Negative Negative Negative      Comments are available for some flowsheets but are not being displayed.             Maria Luisa Berry PA-C  Interventional Radiology  Pager: [phone]    "

## 2021-11-03 NOTE — PROGRESS NOTES
[facility]  Transplant Infectious Disease Progress Note     Patient:  Edson Thornton, Date of birth 1965, Medical record number [id number]  Date of Visit:  11/03/2021         Assessment and Recommendations:   Recommendations:  - Discontinue the empiric meropenem, completing an empiric six day course (without clearcut effect on his prior fevers or leukocytosis).  - Continue fluconazole 400 mg daily through at least 11/8/21 (two weeks from the date of the PICC line's removal; for the candidemia and Candida empyema).  - Continue TMP-SMX and acyclovir prophylaxis.  - Would not add additional antimicrobials now (in the absence of overt sepsis.)  - The 10/29/21 CSF and 10/30/21 BAL results are not suggestive of an infection in either location.    Transplant ID will follow with you.    Prem Soares MD  Pager [phone]    Assessment:  A 56 year old gentleman immunosuppressed (tacrolimus, mycophenolate, prednisone) s/p a 10/16/21 bilateral lung transplant for NSIP / ILD with rheumatoid arthritis who also has a history of bronchiectasis, moderate PH, SALTY, chronic HSV infection, hypogammaglobulinemia, steroid-induced diabetes, hypothyroidism, hypertension, hyperlipidemia, duodenal anomaly, anxiety, and depression.  He was admitted to [facility] on 9/5/21 for acute on chronic respiratory failure due to an ILD exacerbation and underwent lung transplant on 10/16/21.  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  He had fevers on 10/19/21 and on 10/20/21 and a corresponding 10/20/21 blood culture grew pan-susceptible Candida albicans.  He is stable but ventilator dependent (on low vent settings, s/p 10/29/21 tracheostomy) with a multifactorial encephalopathy and persistent leukocytosis.    ID issues:    - Candidemia in a single 10/20/21 blood culture:  Although only one blood culture (out of six post-transplant to that point) grew Candida, that positive blood culture did correspond  to a significant fever spike on 10/20/21 late PM and he had a strongly positive 10/20/21 Fungitell assay, so this very possibly represented a true candidemia and needs to be treated as such.  He is at risk for candidemia due to the presence of multiple lines.  There is no visible site of organ involvement or of hepatosplenic candidiasis on the 10/22/21 and 10/25/21 chest / abdomen CT scans (except perhaps the left pleural effusion).  A 10/23/21 TTE showed no evidence of valvular vegetations.   A 10/24/21 Ophthalmology Consult examination was benign and 10/22/21 x 2, 10/23/21 x 2, and 10/25/21 x 3 surveillance blood cultures are all without growth to date.  Empiric therapy with IV micafungin at Candida treatment dose 100 mg daily has been appropriate to give a two week course through 11/8/21 from the date of the removal of the PICC line on 10/25/21 (with other lines removed previously), but since the C albicans is quite susceptible to fluconazole (TESSIE 0.5) and his LFTs have normalized, finishing out the treatment course with fluconazole on 10/26/21 would be more elegant -- Pulmonary Transplant Consult service agrees and will adjust the tacrolimus dosing (as needed) accordingly.  If surveillance blood cultures are still without growth, a new central line can be placed after a 48 hour line holiday, on 10/27/21.    - Isolation of yeast from 10/25/21 left pleural fluid:  We await the associated culture, fluid cell count (if sent), and cytology, but the candidemia or the transplant itself may possibly have seeded the left pleural space, in which case a longer course of antifungal therapy may be necessary instead of a straight two weeks.    - New, acute, cryptogenic 10/25/21 leukocytosis / fever:  Etiology is unclear -- perhaps due to the left fungal empyema.  This fever is unlikely due to the candidemia itself, since his blood cultures cleared quickly as of 10/22/21.  Other fever evaluation to date has been negative.  A  10/29/21 AM procalcitonin was quite low at 0.38 (with only mild renal insufficiency, creatinine 1.23).  Despite absence of clear indication, empiric meropenem was added on 10/28/21 evening and, so far, some possible response seems to be evident, with a lowered fever curve and slightly improved leukocytosis:  Given that result, will continue the meropenem temporarily (for ~ 72 hours) to give a more definitive trial.  Would definitely not add any other additional antimicrobials at this time, since that would only confuse the picture.    - Sputum Gram stain from 10/25/21 showing Gram positive cocci:  Gram positive cocci in sputum may very well be normal jean marie (coagualse negative Staph) in the absence of other new indicators of a new pneumonia, so would not treat this while awaiting the culture result.  The 10/26/21 chest x-ray shows no new consolidations consistent with pneumonia (and actually seems to be improving).    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  The RBC adjusted CSF WBC count is about zero.  The [employer] CSF PCR panel is negative for herpesviral and other pathogens and a CSF CRAG was negative.    Old ID issues:  - Concern for possible Strongyloides exposure (because a [place]  and in multiple countries with endemic Strongyloides) pre-transplant so received ivermectin dose on 9/17/21.  - Plan for monthly Coccidioides Ag x12 months post-transplant per ID (urine and serum negative 10/17), next due 11/17 (not yet ordered).  Has calcified granulomas on chest CT.  Has a history of residing in a Coccid[place] endemic areas [place] and[place]or Histoplasma endemic area ([place]   - Pre-transplant indeterminate Quantiferon assay:  Assessed 9/16/21 by Transplant ID.   Had been in the [employer] and was stationed in many states including [facility] and [facility]. Also was stationed in [facility] and [facility] and maybe [place].  He was tested with tuberculin skin test on numerous occasions before he became immunocompromised and was never positive. In addition, he was tested with QuantIFERON on 5/11/2021 prior to, or within 24 hr of receiving 10 mg/day of prednisone, the QuantiFERON was negative.  Deemed to no have LTBI.  - Pneumonia treated with a short course of antibiotics in [place].      Other ID issues:  - QTc interval:  466 msec on 10/22/21 EKG.  - Bacterial prophylaxis:  None indicated, has been on post-transplant ceftazidime.  - Pneumocystis prophylaxis:  On TMP-SMX prior to transplant, initially held on admission for BRENNAN, resumed 10/27/21.  - Viral serostatus & prophylaxis:  CMV negative.  Chronic history of intermittent active HSV oral outbreaks, on acyclovir.  EBV / VZV seropositive.  - Fungal prophylaxis:  On treatment micafungin.  - Immunization status:  Not presently relevant.  Did not receive influenza, pneumococcal, or Covid-19 vaccines pre-transplant.  - Gamma globulin status:  History of hypogammaglobulinemia.  Received IVIG on 10/21/21.  - Isolation status: Routine.        Interval History:   Mr. Mcclains fevers have improved over the past 24+ hours (T max 99.7 degrees F, T max 99.1 degrees today) and his peripheral leukocytosis dropped down to 35.1 this AM from a renewed peak of 57.4 yesterday AM.   He remains on empiric meropenem (since 10/28/21 PM, for cryptogenic fever and leukocytosis as well as fluconazole (since 10/26/21, for candidemia and left Candida empyema) and acyclovir prophylaxis (since his 10/16/21 transplant).  TMP-SMX prophylaxis (resumed 10/25/21) was held again yesterday.  He had a brief episode of asystole yesterday requiring one round of CPR.  Hematochezia and blood in the PEG tube were noted yesterday leading to an EGD with a mucosal  bleed adjacent to the tube requiring clipping.  Chest x-ray shows re-accumulation of the left pleural effusion (likely Candida empyema) so a repeat left chest tube is to be placed today.  He remains somnolent but is more responsive, but is still ventilator dependent via the tracheostomy placed 10/29/21.  A 11/2/21 head CT scan showed no new CNS lesions.  Tube feeds via PEG J tube (also placed 10/29/21) are on hold.  His trach secretions are moderate, thick, and cream colored.  A repeat BAL on 10/30/21 showed yeast and staphoid Gram positive cocci on cytology / Gram stain, but only normal jean marie and yeast have grown to date on the cultures.  A 10/26/21 BAL culture grew C albicans.  CSF studies from the 10/29/21 traumatic LP remain negative (with a WBC count difficult to interpret but probably normal given 9,000 RBCs in tube 4).  His chest x-rays show ongoing bibasilar atelectasis, bilateral small pleural effusions, and mild pulmonary edema.  His weight remains substantially above his admission weight and is rising.  The prior right arm PICC was removed 10/25/21 PM and a central line holiday continues.    The 10/29/21 lumbar puncture had an opening pressure of 9 cm H2O, a cell count indicative of a traumatic tap (pink in tube 4, cloudy, WBC 80 (94% N, 3% L, 4% M), RBC 9,000), glucose 83, protein 97, [employer]fire menigioencephalitis PCR panel negative, CRAG negative, and other cultures and studies pending.  The RBC-adjusted CSF WBC count was about zero.  11/2/21 tracheostomy sputum culture is without growth so far.  110/30/21 BAL cultures have grown only C albicans.  A 10/25/21 left pleural fluid culture grew 2+ C albicans.  The 10/20/21 blood culture and 10/20/21 and 10/22/21 ETT sputum cultures also all grew Candida albicans, with the blood culture isolate pan-susceptible (micafungin TESSIE <0.03, fluconazole TESSIE 0.5).  The other 10/20/21 blood culture as well as 10/22/21 x 2, 10/23/21 x 2, and 10/25/21 x 3 surveillance blood  cultures were all negative.  The 10/23/21 serum cryptococcal antigen assay was negative.  10/22/21 and 10/25/21 abdominal CT scans showed no evidence of hepatosplenic candidiasis or other intrabdominal infection.  10/23 TTE showed no vegetations.  A 10/24/21 Ophthalmology Consult examination was benign.        History of Infectious Disease Illness (copied from the 10/23/21 Transplant ID Consult Note):   A 56 year old gentleman immunosuppressed (tacrolimus, mycophenolate, prednisone) s/p a 10/16/21 bilateral lung transplant for NSIP / ILD with rheumatoid arthritis who also has a history of bronchiectasis, moderate PH, SALTY, chronic HSV infection, hypogammaglobulinemia, steroid-induced diabetes, hypothyroidism, hypertension, hyperlipidemia, duodenal anomaly, anxiety, and depression.  He was admitted to [facility] on 9/5/21 for acute on chronic respiratory failure due to an ILD exacerbation and underwent lung transplant on 10/16/21.  The transplant surgery was complicated by early low cardiac index and immediate post-operative PGD as well as later atrial fibrillation with RVR on 10/18/21 as well as BRENNAN.  He is now day 7 post transplant and remains intubated in the CVICU on inhaled nitrous oxide with compromised capacity to wean off sedation.  He had immediate post-operative fevers for ~ 24 hours, then was afebrile for ~ 1.5 days, then respiked fevers up to 101.3 degrees F on 10/19/21 late PM and 10/20/21 late PM.  He had another low grade fever to 100.9 degrees on 10/22/21 PM.  He has been on empiric ceftazidime since transplant, as well as acyclovir prophylaxis (and was on TMP-SMX prophylaxis prior to transplant).  His post-operative peripheral WBC peaked at 44.5 on 10/18/21, fell to 27.4 by 10/20/21, and is now at 21.3.  Blood cultures x 4 obtained on 10/17/21 were negative, but one of two blood cultures peripherally drawn on 10/20/21 early AM (with the fever spike) grew yeast (ID pending) on 10/22/21 at 52 hours of  incubation; the other peripherally drawn blood culture lacks growth to date.  Surveillance blood cultures x 2 from 10/22/21 afternoon are without growth to date.  A Fungitell assay was positive at 399 on 10/20/21.  Of note, respiratory (broncial washing, ETT sputum) cultures obtained 10/17/21 and 10/20/21 have grown Candida albicans along with Staph epidermidis and other normal jean marie.  IV micafungin 100 mg daily was instituted on 10/22/21 late AM.  A 10/22/21 chest / abdominal CT scan did not reveal any likely source.  He has a history of potential Coccidioides exposure, but a 10/17/21 Coccidioides antibody assay was negative and a Coccidioides antigen assay is pending.  This morning he had some blood in his OG tube and underwent upper endoscopy.  He remains intubated but is opening his eyes to auditory stimulation and tracking today, although not moving to request.  He is hemodynamically stable.    Exposure History:  Had been in the [employer] and was stationed in many states including [facility] and [facility]. Also was stationed in [place] and [facility] and maybe [place].  Has a history of residing in a [place]occidioides endemic areas ([place] and/or Histoplasma endemic area ([place]  Extensive travel as a  within the [place].      Transplants:  10/16/2021 (Lung), Postoperative day:  18.  Coordinator Kassandra Estrada    Review of Systems:  ROS is unobtainable because he is intubated, sedated, and unresponsive on the ventilator.    Past Medical History:   Diagnosis Date     BRENNAN (acute kidney injury) (H) 10/17/2021     Anxiety      Depression      HLD (hyperlipidemia)      HTN (hypertension)      Hypothyroidism      ILD (interstitial lung disease) (H)      SALTY on CPAP      Oxygen dependent     BL 4L since ~6/2021     Rheumatoid arthritis (H)     signs ~5/2020, dx 5/2021     S/P lung transplant (H) 10/16/2021     Shock liver 10/17/2021     Steroid-induced hyperglycemia      Traction bronchiectasis (H)      Past  Surgical History:   Procedure Laterality Date     COLONOSCOPY W/ BIOPSIES AND POLYPECTOMY  07/21/2020     CV CORONARY ANGIOGRAM N/A 9/8/2021    Procedure: Coronary Angiogram with possible intervention;  Surgeon: Jovon Bullock MD;  Location:  HEART CARDIAC CATH LAB     CV RIGHT HEART CATH MEASUREMENTS RECORDED N/A 9/8/2021    Procedure: Right Heart Cath;  Surgeon: Jovon Bullock MD;  Location:  HEART CARDIAC CATH LAB     ESOPHAGOSCOPY, GASTROSCOPY, DUODENOSCOPY (EGD), COMBINED N/A 10/23/2021    Procedure: ESOPHAGOGASTRODUODENOSCOPY (EGD);  Surgeon: Miquel Pisano MD;  Location:  GI     ESOPHAGOSCOPY, GASTROSCOPY, DUODENOSCOPY (EGD), COMBINED N/A 11/2/2021    Procedure: ESOPHAGOGASTRODUODENOSCOPY (EGD);  Surgeon: Daniel Ortiz MD;  Location:  GI     EXTRACTION(S) DENTAL N/A 9/22/2021    Procedure: EXTRACTION tooth #19;  Surgeon: Deepak Tobin DDS;  Location: [facility] OR     HERNIA REPAIR       right acl       TRACHEOSTOMY PERCUTANEOUS N/A 10/29/2021    Procedure: Percutaneous Tracheostomy,;  Surgeon: Celine Jenkins MD;  Location: [facility] OR     TRANSPLANT LUNG RECIPIENT SINGLE X2 Bilateral 10/16/2021    Procedure: TRANSPLANT, LUNG, RECIPIENT, BILATERAL, Bronchoscopy, on-pump perfusion, bilateral clamshell sternotomy;  Surgeon: Yanick Corral MD;  Location: [facility] OR     XR ACROMIOCLAVICULAR JOINT BILATERAL       Family History   Problem Relation Age of Onset     Diabetes Type 1 Mother      Heart Disease Mother      Chronic Obstructive Pulmonary Disease Mother      Rheumatoid Arthritis Father      Emphysema Paternal Grandfather      Social History     Tobacco Use     Smoking status: Never Smoker     Smokeless tobacco: Never Used   Substance Use Topics     Alcohol use: Never     Drug use: Never       There is no immunization history on file for this patient.    Patient Active Problem List   Diagnosis     S/P lung transplant (H)     BRENNAN (acute kidney injury) (H)      Shock liver          Current Medications & Allergies:       acetaZOLAMIDE  500 mg Oral TID     acetylcysteine  2 mL Nebulization 4x Daily     acyclovir  400 mg Oral or J tube BID     amiodarone  200 mg Oral or Feeding Tube Daily     calcium carbonate 600 mg-vitamin D 400 units  1 tablet Oral or Feeding Tube BID w/meals     fluconazole  400 mg Intravenous Q24H     [Held by provider] insulin glargine  40 Units Subcutaneous QAM AC     levalbuterol  1.25 mg Nebulization 4x Daily     levothyroxine  25 mcg Oral Daily     lidocaine  2 patch Transdermal Q24H     lidocaine   Transdermal Q8H     metoprolol tartrate  12.5 mg Oral BID     multivitamins w/minerals  15 mL Per Feeding Tube Daily     mycophenolate  1,000 mg Per J Tube BID     nystatin  1,000,000 Units Swish & Swallow 4x Daily     [START ON 11/7/2021] prednisoLONE  12.5 mg Per J Tube 2 times daily     prednisoLONE  12.5 mg Per J Tube QPM     prednisoLONE  15 mg Per J Tube QAM     [Held by provider] rosuvastatin  10 mg Oral or Feeding Tube Daily     senna-docusate  1 tablet Oral or Feeding Tube BID     sodium chloride (PF)  3 mL Intracatheter Q8H     [Held by provider] sulfamethoxazole-trimethoprim  10 mL Oral or J tube Daily    Or     [Held by provider] sulfamethoxazole-trimethoprim  1 tablet Oral or Feeding Tube Daily     tacrolimus  0.5 mg Sublingual QAM     tacrolimus  1 mg Sublingual QPM     Infusions/Drips:      dextrose 30 mL/hr at 11/02/21 2000     dextrose Stopped (10/24/21 1008)     insulin regular Stopped (11/02/21 1100)     pantoprazole (PROTONIX) infusion ADULT/PEDS GREATER than or EQUAL to 45 kg 8 mg/hr (11/03/21 1200)     BETA BLOCKER NOT PRESCRIBED       No Known Allergies         Physical Exam:     Patient Vitals for the past 24 hrs:   Temp Temp src Pulse Resp SpO2 Weight   11/03/21 1100 97.7  F (36.5  C) -- 84 -- 95 % --   11/03/21 1045 97.5  F (36.4  C) -- 83 -- 97 % --   11/03/21 1030 97.5  F (36.4  C) -- 87 -- 96 % --   11/03/21 1015 97.7  F  (36.5  C) -- 86 -- 97 % --   11/03/21 1000 98.1  F (36.7  C) -- 92 -- 96 % --   11/03/21 0945 98.2  F (36.8  C) -- 93 -- 97 % --   11/03/21 0930 98.4  F (36.9  C) -- 96 -- 97 % --   11/03/21 0915 98.4  F (36.9  C) -- 91 -- 97 % --   11/03/21 0900 98.4  F (36.9  C) -- 89 -- 98 % --   11/03/21 0845 98.4  F (36.9  C) -- 86 -- 98 % --   11/03/21 0830 98.4  F (36.9  C) -- 81 -- 99 % --   11/03/21 0815 98.4  F (36.9  C) -- 80 -- 100 % --   11/03/21 0800 98.4  F (36.9  C) Bladder 82 20 100 % --   11/03/21 0745 98.6  F (37  C) -- 83 -- 100 % --   11/03/21 0730 98.6  F (37  C) -- 86 -- 100 % --   11/03/21 0700 98.6  F (37  C) -- 80 30 99 % --   11/03/21 0600 98.6  F (37  C) -- 84 21 98 % --   11/03/21 0500 99  F (37.2  C) -- 89 25 99 % --   11/03/21 0400 99.1  F (37.3  C) Bladder 89 26 99 % 72.2 kg (159 lb 2.8 oz)   11/03/21 0300 99.1  F (37.3  C) -- 88 26 99 % --   11/03/21 0200 99.1  F (37.3  C) -- 91 25 98 % --   11/03/21 0100 99.3  F (37.4  C) -- 91 24 98 % --   11/03/21 0000 99.3  F (37.4  C) Bladder 87 29 98 % --   11/02/21 2300 99.5  F (37.5  C) -- 93 24 97 % --   11/02/21 2200 99.7  F (37.6  C) -- 96 23 99 % --   11/02/21 2100 99.9  F (37.7  C) -- 94 26 98 % --   11/02/21 2000 99.5  F (37.5  C) Bladder 101 25 98 % --   11/02/21 1900 99.3  F (37.4  C) -- 97 -- 98 % --   11/02/21 1845 99.3  F (37.4  C) -- 96 -- 98 % --   11/02/21 1830 99.5  F (37.5  C) -- 97 -- 98 % --   11/02/21 1815 99.5  F (37.5  C) -- 87 -- 97 % --   11/02/21 1800 99.5  F (37.5  C) -- 99 24 99 % --   11/02/21 1745 99.5  F (37.5  C) -- 101 -- 98 % --   11/02/21 1730 99.5  F (37.5  C) -- 101 -- 97 % --   11/02/21 1715 99.3  F (37.4  C) -- 100 -- 98 % --   11/02/21 1700 99.3  F (37.4  C) -- 98 -- 97 % --   11/02/21 1645 99.3  F (37.4  C) -- 99 -- 97 % --   11/02/21 1630 99.3  F (37.4  C) -- 93 -- 96 % --   11/02/21 1615 99.1  F (37.3  C) -- 98 -- 96 % --   11/02/21 1600 99.1  F (37.3  C) Bladder 96 27 96 % --   11/02/21 1545 99  F (37.2  C) -- 96 --  94 % --   11/02/21 1530 99  F (37.2  C) -- 94 -- 93 % --   11/02/21 1515 99  F (37.2  C) -- 93 -- 92 % --   11/02/21 1430 99  F (37.2  C) -- 92 24 91 % --   11/02/21 1415 99  F (37.2  C) -- 86 -- 91 % --   11/02/21 1400 99  F (37.2  C) -- 87 -- 97 % --   11/02/21 1345 -- -- 89 -- 97 % --   11/02/21 1340 -- -- 92 -- 98 % --   11/02/21 1300 -- -- 99 -- 97 % --   11/02/21 1245 -- -- 100 -- -- --   11/02/21 1230 -- -- 102 -- -- --   11/02/21 1215 -- -- 108 -- -- --   11/02/21 1203 99.9  F (37.7  C) -- 111 -- 98 % --   11/02/21 1200 99.9  F (37.7  C) -- 111 -- 100 % --   11/02/21 1145 99.9  F (37.7  C) Bladder 105 25 100 % --     Ranges for vital signs over the past 24 hours:    Temp:  [97.5  F (36.4  C)-99.9  F (37.7  C)] 97.7  F (36.5  C)  Pulse:  [] 84  Resp:  [20-30] 20  MAP:  [65 mmHg-106 mmHg] 80 mmHg  Arterial Line BP: ()/(34-88) 126/66  FiO2 (%):  [70 %-80 %] 70 %  SpO2:  [91 %-100 %] 95 %  Vitals:    11/01/21 0200 11/02/21 0400 11/03/21 0400   Weight: 69.5 kg (153 lb 3.5 oz) 69.4 kg (153 lb) 72.2 kg (159 lb 2.8 oz)     Intake/Output Summary (Last 24 hours) at 11/3/2021 1141  Last data filed at 11/3/2021 1100  Gross per 24 hour   Intake 1978.91 ml   Output 2245 ml   Net -266.09 ml     Physical Examination:  GENERAL:  Transient awakable, moving toes and head to request, WDWN, 56 year old man in NAD on the ventilator via tracheostomy.  HEAD:  NCAT.  EYES:  PERRL, anicteric sclerae.  ENT:  No otorrhea.  No anterior oral lesion.  NECK:  Supple, tracheostomy site lacks inflammation.  LYMPH:  No cervical lymphadenopathy.  LUNGS:  Decreased posterior bibasilar breath sounds, anterior clear to auscultation bilaterally.  CHEST:  Wound VAC on clamshell incision.  Old cube sites dressed.  CARDIOVASCULAR:  RRR, without murmur.  ABDOMEN: Bowel sounds present, soft, nontender by monitor to palpation, GJ tube with blood but site lacks inflammation.  :  Watson with leena urine.  EXTREMITIES:  Distally warm, no  edema.  SKIN:  No acute rash or lesion.  Peripheral IV line sites lack inflammation.  NEUROLOGIC:  Non-communicative but wiggles toes inconsistently to request.         Laboratory Data:     No results found for: ACD4    Inflammatory Markers    Recent Labs   Lab Test 10/29/21  0542 10/14/21  0943 09/07/21  1324 09/06/21  0633   SED 87*  --   --   --    CRP 53.0* 23.0*   < >  --    PSA  --   --   --  0.56    < > = values in this interval not displayed.     Immune Globulin Studies     Recent Labs   Lab Test 10/15/21  0907 09/07/21  1324 09/07/21  1100   * 363*  363*  --    IGM  --  51  --    IGE  --   --  83   IGA  --  103  --      Metabolic Studies       Recent Labs   Lab Test 11/03/21  1024 11/03/21  1023 11/03/21  0411 11/03/21  0407 10/29/21  1106 10/29/21  1046 10/22/21  1602 10/22/21  1601 10/22/21  0959 10/22/21  0958 10/20/21  1004 10/20/21  0957 09/07/21  1100 09/07/21  0928   NA  --  150*  --  148*   < >  --    < > 147*   < > 147*   < > 146*   < >  --    POTASSIUM  --  4.4  --  5.3   < >  --    < > 3.6   < > 3.5   < > 4.1   < >  --    CHLORIDE  --  114*  --  116*   < >  --    < > 109   < > 106   < > 107   < >  --    CO2  --  29  --  27   < >  --    < > 31   < > 34*   < > 34*   < >  --    ANIONGAP  --  7   < > 5   < >  --    < > 7   < > 7   < > 5   < >  --    BUN  --  111*   < > 109*   < >  --    < > 68*   < > 62*   < > 54*   < >  --    CR  --  1.24  --  1.27*   < >  --    < > 1.47*   < > 1.54*   < > 1.60*   < >  --    GFRESTIMATED  --  65   < > 63   < >  --    < > 53*   < > 50*   < > 47*   < >  --    GLC  --  159*   < > 138*   < >  --    < > 148*   < > 92   < > 45*   < >  --    A1C  --   --   --   --   --   --   --   --   --   --   --   --   --  6.6*   ERIK  --  9.1  --  9.4   < >  --    < > 8.2*   < > 7.9*   < > 8.0*   < >  --    PHOS  --   --   --  4.4   < >  --    < > 2.8  --   --    < >  --    < >  --    MAG  --   --   --  2.1   < >  --    < > 2.1  --   --    < >  --    < >  --    LACT 1.8  --   --   1.8   < >  --    < >  --    < >  --    < >  --    < >  --    PCAL  --   --   --   --   --  0.38*  --   --   --   --   --   --    < >  --    FGTL  --   --   --   --   --   --   --   --   --   --   --  399  --   --    CKT  --   --   --   --   --   --   --  51  --  55  --   --    < >  --     < > = values in this interval not displayed.     Hepatic Studies    Recent Labs   Lab Test 11/03/21 0407 11/03/21  0032 11/02/21  1211 11/02/21  1211 10/27/21  0523 10/26/21  0637 10/25/21  1609 10/25/21  1141 09/30/21  1059 09/19/21  1629   BILITOTAL 0.5  --   --  0.6   < > 0.3   < >  --    < >  --    DBIL  --   --   --   --   --  0.1   < >  --    < >  --    ALKPHOS 146  --    < > 182*   < > 160*   < >  --    < >  --    PROTTOTAL 4.8*  --    < > 4.5*   < > 5.8*   < >  --    < >  --    ALBUMIN 1.8*  --    < > 1.5*   < > 1.8*   < >  --    < >  --    *  --    < > 450*   < > 21   < >  --    < >  --    *  --    < > 392*   < > 71*   < >  --    < >  --    LDH  --   --   --   --   --   --   --   --   --  423*   DOREEN  --  25  --   --   --   --   --  29   < >  --     < > = values in this interval not displayed.     Pancreatitis testing    Recent Labs   Lab Test 10/22/21  0407 09/07/21  1324 09/07/21  1100   AMYLASE  --   --  32   TRIG 307*   < >  --     < > = values in this interval not displayed.     Hematology Studies   Recent Labs   Lab Test 11/03/21  1023 11/03/21  0407 11/02/21  2206 11/02/21  2206 11/02/21  1607 11/02/21  1607 11/02/21  1302 11/02/21  1053 11/02/21  0832 11/02/21  0815 11/01/21  0818 10/31/21  0610 10/30/21  0944 10/30/21  0503   WBC  --  35.1*  --  36.4*  --  40.1*  --  57.4*  57.4*   < > 37.5*   < > 26.6*   < > 36.1*   ANEU  --   --   --   --   --   --   --  49.9*  --  28.5*   < >  --   --   --    ANEUTAUTO  --   --   --   --   --   --   --   --   --   --   --  23.6*  --  32.6*   ALYM  --   --   --   --   --   --   --  4.0   < > 4.9   < >  --   --   --    ALYMPAUTO  --   --   --   --   --   --   --    --   --   --   --  0.9   < > 1.1   DONALD  --   --   --   --   --   --   --  1.7*   < > 2.3*   < >  --   --   --    AMONOAUTO  --   --   --   --   --   --   --   --   --   --   --  1.3   < > 1.3   AEOS  --   --   --   --   --   --   --  0.0   < > 0.0   < >  --   --   --    AEOSAUTO  --   --   --   --   --   --   --   --   --   --   --  0.0   < > 0.0   ABSBASO  --   --   --   --   --   --   --   --   --   --   --  0.1   < > 0.1   HGB 9.6* 10.1*   < > 10.1*   < > 10.5*   < > 8.8*   < > 5.8*   < > 8.2*   < > 8.6*   HCT  --  30.3*  --  30.1*   < > 31.3*   < > 26.8*   < > 19.6*   < > 26.9*   < > 28.0*   PLT  --  159  --  151   < > 131*   < > 244   < > 382   < > 196   < > 207    < > = values in this interval not displayed.     Clotting Studies    Recent Labs   Lab Test 11/03/21 0407 11/02/21  1053 11/02/21  0927 11/02/21  0908 11/02/21  0832   INR 1.36* 1.55*  --  1.56* 1.38*   PTT  --  34   < >  --  50*    < > = values in this interval not displayed.     Iron Testing    Recent Labs   Lab Test 11/03/21 0407 11/01/21  0818 10/31/21  0610 10/23/21  0344 10/22/21  0959 09/19/21  1629 09/19/21  1304 09/10/21  0616 09/09/21  0536   IRON  --   --  35  --   --   --   --   --  146   FEB  --   --  309  --   --   --   --   --  190*   IRONSAT  --   --  11*  --   --   --   --   --  77*   ARMEN  --   --   --   --   --   --   --   --  1,049*   MCV 91   < > 101*   < >  --    < > 90   < > 87   HAPT  --   --   --   --  380*   < >  --   --   --    RETP  --   --   --   --   --   --  5.2*  --   --    RETICABSCT  --   --   --   --   --   --  0.177*  --   --     < > = values in this interval not displayed.     Autoimmune Testing    Recent Labs   Lab Test 09/07/21  1324   RNPIGG Negative   SMIGG Negative   SSAIGG Negative   SSBIGG Negative     Arterial Blood Gas Testing    Recent Labs   Lab Test 11/03/21  1024 11/03/21  0407 11/02/21  2206 11/02/21  1822 11/02/21  1607 11/02/21  1607   PH 7.49* 7.54* 7.50* 7.51*  --  7.49*   PCO2 39 35 40 40   --  43   PO2 75* 117* 142* 91  --  66*   HCO3 30* 30* 31* 32*  --  33*   O2PER 60 70 80 80   < > 80    < > = values in this interval not displayed.     Thyroid Studies     Recent Labs   Lab Test 10/29/21  1046 10/29/21  0542 09/07/21  1100 09/06/21  0633 09/06/21  0633   TSH  --  7.90* 0.11*  --  0.19*   T4 1.04  --  0.68*   < > 0.73*    < > = values in this interval not displayed.     Urine Studies     Recent Labs   Lab Test 11/01/21  1336 10/25/21  1507 10/22/21  1641 10/17/21  1642 10/17/21  1041   URINEPH 5.5 5.5 7.5* 5.0 5.0   NITRITE Negative Negative Negative Negative Negative   LEUKEST Negative Negative Negative Negative Trace*   WBCU 0 2 3 25* 44*     Medication levels    Recent Labs   Lab Test 11/03/21  0606 10/18/21  0549 10/18/21  0403   VANCOMYCIN  --   --  12.6   TACROL 10.5   < >  --     < > = values in this interval not displayed.     Microbiology:    Fungal testing  Recent Labs   Lab Test 10/20/21  0957   FGTL 399   FGTLI Positive*     Last Culture results with specimen source  Culture   Date Value Ref Range Status   11/02/2021 No growth after 12 hours  Preliminary   11/02/2021 No growth after 12 hours  Preliminary   10/30/2021 2+ Normal jean marie  Final   10/30/2021 Yeast (A)  Preliminary   10/29/2021 No anaerobic organisms isolated after 4 days  Preliminary   10/29/2021 No growth after 4 days  Preliminary   10/29/2021 No Growth  Final   10/26/2021 2+ Normal jean marie  Final   10/26/2021 Candida albicans (A)  Preliminary   10/25/2021 1+ Normal jean marie  Final   10/25/2021 2+ Candida albicans (A)  Final     Comment:     Susceptibilities not routinely done   10/25/2021 Candida albicans (A)  Preliminary   10/25/2021 No growth after 8 days  Preliminary   10/25/2021 No Growth  Final   10/25/2021 No Growth  Final   10/25/2021 No Growth  Final    No results found for: SDES     Last check of C difficile  C Difficile Toxin B by PCR   Date Value Ref Range Status   10/27/2021 Negative Negative Final     Comment:     A  negative result does not exclude actual disease due to C. difficile and may be due to improper collection, handling and storage of the specimen or the number of organisms in the specimen is below the detection limit of the assay.       Quantiferon testing   Recent Labs   Lab Test 11/02/21  1053 11/02/21  0815 09/16/21  0645 09/07/21  1324   TBRES  --   --   --  Indeterminate*   LYMPH 7 13   < > 2    < > = values in this interval not displayed.     Virology:    Coronavirus-19 testing    Recent Labs   Lab Test 11/01/21  1141 10/24/21  1644 10/17/21  1329 10/15/21  0614 09/15/21  1216 09/07/21  1324 09/06/21  0010 08/30/21  0752 08/02/21  1230 06/20/20  1454   CD19  --   --   --   --   --  <1*  --   --   --   --    ACD19  --   --   --   --   --  1*  --   --   --   --    [id number] Negative Negative Negative Negative   < >  --    < >  --   --   --    COVIDPCREXT  --   --   --   --   --   --   --  Not Detected Not Detected Not Detected    < > = values in this interval not displayed.     Respiratory virus (non-coronavirus-19) testing    Recent Labs   Lab Test 10/17/21  1331   IFLUA Negative   FLUAH1 Negative   LO6189 Negative   FLUAH3 Negative   IFLUB Negative   PIV1 Negative   PIV2 Negative   PIV3 Negative   HRVS Negative   RSVA Negative   RSVB Negative   HMPV Negative   ADVBE Negative   ADVC Negative     CMV viral loads    Recent Labs   Lab Test 10/26/21  1540   CMVQNT Not Detected       Hepatitis B Testing     Recent Labs   Lab Test 10/15/21  0907 09/16/21  2157 09/07/21  1324 09/07/21  1100   AUSAB 0.12  --   --  0.51   HBCAB Nonreactive  --   --  Nonreactive   HEPBANG Nonreactive Nonreactive   < >  --     < > = values in this interval not displayed.     Hepatitis C Antibody   Date Value Ref Range Status   10/15/2021 Nonreactive Nonreactive Final   09/08/2021 Nonreactive Nonreactive Final       CMV Antibody IgG   Date Value Ref Range Status   10/15/2021 No detectable antibody. No detectable antibody.  Final    09/07/2021 No detectable antibody. No detectable antibody.  Final     Varicella Zoster Antibody IgG   Date Value Ref Range Status   09/07/2021 Positive (A) No detectable antibody.  Final     Comment:     Suggests previous exposure or immunization and probable immunity.     EBV Capsid Antibody IgG   Date Value Ref Range Status   10/15/2021 Positive (A) No detectable antibody. Final     Comment:     Suggests recent or past exposure.   09/07/2021 Positive (A) No detectable antibody. Final     Comment:     Suggests recent or past exposure.     Toxoplasma Antibody IgG   Date Value Ref Range Status   09/07/2021 <3.0 0.0 - 7.1 IU/mL Final     Comment:     Negative- Absence of detectable Toxoplasma gondii IgG antibodies. A negative result does not rule out acute infection.     Herpes Simplex Virus Type 1 IgG Antibody   Date Value Ref Range Status   10/15/2021 Positive.  IgG antibody to HSV-1 detected. (A) No HSV-1 IgG antibodies detected Final   09/07/2021 Positive.  IgG antibody to HSV-1 detected. (A) No HSV-1 IgG antibodies detected Final     Herpes Simplex Virus Type 2 IgG Antibody   Date Value Ref Range Status   10/15/2021 Positive.  IgG antibody to HSV-2 detected. (A) No HSV-2 IgG antibodies detected Final   09/07/2021 Positive.  IgG antibody to HSV-2 detected. (A) No HSV-2 IgG antibodies detected Final     Imaging:  Recent Results (from the past 48 hour(s))   XR Chest Port 1 View    Narrative    Exam: XR CHEST PORT 1 VIEW, 11/2/2021 1:25 AM    Indication: s/p lung transplant    Comparison: 11/1/2021    Findings:   Postsurgical changes of lung transplant. Tracheostomy distal tip  projects over the high thoracic trachea. Small bilateral left greater  than right pleural effusions with hazy bibasilar airspace opacities.  No pneumothorax. No acute osseous abnormalities.      Impression    Impression: Stable exam. Small bilateral pleural effusions and hazy  bibasilar left greater than right airspace opacities,  suggesting  atelectasis.    I have personally reviewed the examination and initial interpretation  and I agree with the findings.    MAURICIO NAVAS MD         SYSTEM ID:  [id number]   XR Chest Port 1 View    Narrative    Exam: XR CHEST PORT 1 VIEW, 11/2/2021 10:02 AM    Indication: code blue    Comparison: Same-day radiograph 1:23 AM    Findings:   Single portable supine AP view of the chest. Patient is rotated to the  left. Post surgical changes of bilateral lung transplant with  clamshell sternotomy sutures, mediastinal surgical clips, and skin  surgical staples. Tracheostomy. Midline trachea. No pneumothorax.  Bilateral small pleural effusions, unchanged from prior. Stable hazy  bibasilar airspace opacities. Stable cardiac silhouette. No acute  osseous abnormalities.      Impression    Impression:   Grossly unchanged bibasilar hazy opacities and bilateral small pleural  effusions.    I have personally reviewed the examination and initial interpretation  and I agree with the findings.    DAVIN ANGUIANO MD         SYSTEM ID:  [id number]   XR Abdomen Port 1 View    Narrative    EXAM: XR ABDOMEN PORT 1 VIEWS 11/2/2021 10:03 AM     HISTORY: Code blue       COMPARISON:  Chest abdomen pelvis CT 10/25/2021    FINDINGS:   Supine radiograph of the abdomen. Percutaneous gastrojejunostomy tube  with tip overlying the left lower quadrant within the jejunum. Partial  visualization of right inferior approach central line. Partially  visualized Watson catheter. Nonobstructive bowel gas pattern. Scattered  prominent although not dilated loops of large and small bowel  throughout the abdomen. No pneumatosis or portal venous gas. Bilateral  pleural effusions. Left basilar opacities. No acute osseous  abnormality.        Impression    IMPRESSION:   1. Nonobstructive bowel gas pattern. No pneumatosis or portal venous  gas.  2. Percutaneous gastrojejunostomy tube tip projects over the proximal  jejunum.     I have personally reviewed the  examination and initial interpretation  and I agree with the findings.    ROSS MAK MD         SYSTEM ID:  [id number]   Echo Limited    Narrative    [id number]  [id number]  [id number]  [id number]^BERTHA^EFREN     [facility]  Echocardiography Laboratory  [address] [place] [id number]     Name: SHAYNA GUERRIER  MRN: [id number]  : 1965  Study Date: 2021 11:10 AM  Age: 56 yrs  Gender: Male  [facility]  Reason For Study: Shock  Ordering Physician: EFREN STONE  Referring Physician: SAUNDRA GILL  Performed By: Pamela Díaz RDCS     BSA: 1.7 m2  Height: 64 in  Weight: 152 lb  HR: 110  BP: 94/63 mmHg  ______________________________________________________________________________  Procedure  Limited Echocardiogram with portions of two-dimensional, color and spectral  Doppler performed.  ______________________________________________________________________________  Interpretation Summary  Small biventricular size with hyperdynamic biventricular function.  The inferior vena cava was normal in size with preserved respiratory  variability.  No pericardial effusion is present.  ______________________________________________________________________________  Left Ventricle  Small left ventricular size with a hyperdynamic function.     Right Ventricle  Small right ventricular size with a hyperdynamic function.     Vessels  The inferior vena cava was normal in size with preserved respiratory  variability.     Pericardium  No pericardial effusion is present.     Miscellaneous  A left pleural effusion is present.  ______________________________________________________________________________  Report approved by: Jazmin Alonzo 2021 12:30 PM     ______________________________________________________________________________      CT Head w/o Contrast    Narrative    CT HEAD W/O CONTRAST 2021 1:43 PM    History: Anoxic brain damage     Comparison:  MR brain 10/26/2021 and CT head 10/25/2021    Technique: Using multidetector thin collimation helical acquisition  technique, axial, coronal and sagittal CT images from the skull base  to the vertex were obtained without intravenous contrast.   (topogram) image(s) also obtained and reviewed.    Findings:   Redemonstration of hypodense subacute infarcts of the left occipital  lobe, right occipital lobe, and bilateral parietal lobes. Scattered  gyral hyperdensity corresponding to the effected regions, consistent  with cortical laminar necrosis. No new area of infarct. No  superimposed parenchymal hemorrhage. Mild generalized cerebral volume  loss. No mass effect or midline shift. Ventricles are  proportionate/cerebral sulci. Basal cisterns are clear.     The bony calvaria and the bones of the skull base are normal.  Bilateral mastoid air cell effusions, likely related to intubation.  Visualized portions of the paranasal sinuses are clear. Orbits  unremarkable. Partially visualized emphysema in the parapharyngeal  spaces, likely related to recent tracheostomy.         Impression    Impression: No new acute intracranial abnormalities. Scattered  subacute multifocal infarctions in both cerebral hemispheres, in  similar distribution to prior MRI. No CT evidence for new focal  cortical hypoattenuations to suggest acute infarction or new areas of  anoxic brain damage. No intracranial hemorrhage.    I have personally reviewed the examination and initial interpretation  and I agree with the findings.    SUZI HOLDEN MD         SYSTEM ID:  [id number]   CT Chest/Abdomen/Pelvis w Contrast    Narrative    EXAMINATION: CT CHEST/ABDOMEN/PELVIS W CONTRAST 11/2/2021 1:46 PM    TECHNIQUE: Helical CT images from the thoracic inlet through the  symphysis pubis were obtained with intravenous contrast. Contrast  dose: Iopamidol (ISOVUE-370) solution 100 mL    COMPARISON: CT 10/25/2021    HISTORY: GI  bleed    FINDINGS:    Chest:  Heart/ Mediastinum: Heart is within normal limits. Atherosclerotic  calcifications of the aorta and coronary arteries. Small pericardial  effusion. No bulky lymphadenopathy. Esophagus appears normal. Foci of  gas in the lower neck likely from tracheostomy placement. Small  filling defect in the lower right internal jugular vein (series 21,  image 8), decreased in size since 10/25/2021.    Lungs/pleura: The tracheostomy tube tip is approximately 5 cm above  the shiv. There is fluid density filling in the superior trachea  above the tracheostomy tube. Postsurgical changes of bilateral lung  transplant. There is increased moderate left pleural effusion and  increased small right pleural effusion. Bibasilar opacities, which are  predominantly homogeneously enhancing although there is a region  within the left lower lobe which is hypoenhancing. No pneumothorax.    Chest wall/axilla: Sternotomy wires. No lymphadenopathy.    Abdomen and Pelvis:    Liver: Unremarkable. No suspicious masses. No hepatic steatosis.     Gallbladder/biliary tree: No gallstones. No biliary dilatation.    Spleen: Unremarkable.    Pancreas: Unremarkable. No mass or ductal dilatation.    Adrenal glands: Unremarkable.    Kidneys: Unremarkable. No hydronephrosis.    Bowel: Percutaneous gastrojejunostomy tube has been placed with tip in  proximal jejunum. Heterogeneously hyperdense fluid within the  distended stomach. No definite extravasation identified on  postcontrast images. Metallic clips in the stomach and duodenum,  within a small periampullary duodenal diverticulum. Sigmoid  diverticulosis without evidence of acute diverticulitis. Small bowel  is dilated up to 3 cm without definite transition point. Rectal tube  is in place.    Retroperitoneum: Vasculature is grossly unremarkable. No bulky  lymphadenopathy.    Pelvis: Watson catheter is present within the urinary bladder. There is  a small amount of air within the  urinary bladder, as well as a single  punctate focus of air within the mid right kidney. Prostate and  seminal vesicles are within normal limits. Right femoral venous  catheter with the tip positioned in the right external iliac vein.    Bones: Unremarkable. No suspicious lesions.    Soft Tissues: Small ill-defined fluid collections deep to the  pectoralis major muscles bilaterally (series 21, image 123),  increased/new compared to the previous CT. This measures up to 5.8 x  2.4 cm on the right. Numerous nondisplaced anterior rib fractures  bilaterally.        Impression    IMPRESSION:  1.  The stomach is distended with hyperdense fluid suggestive of blood  products. No contrast extravasation in the gastrointestinal tract to  indicate ongoing hemorrhage.   2.  Increased bilateral pleural effusions compared to CT 10/25/2021,  moderate on the left, small on the right. Bibasilar atelectasis with  an area of hypoenhancing parenchyma in the left lower lobe, suspicious  for infection.  3.  Dilated small bowel without definite transition point suggestive  of ileus.  4.  Decreased nonocclusive thrombus in the right internal jugular  vein.  5.  Numerous nondisplaced anterior rib fractures bilaterally. Small  fluid collections deep to the pectoralis major muscles bilaterally,  increased/new compared to the previous CT and likely representing  small seromas/hematomas.       I have personally reviewed the examination and initial interpretation  and I agree with the findings.    ROSS MAK MD         SYSTEM ID:  [id number]   XR Chest Port 1 View    Narrative    Exam: XR CHEST PORT 1 VIEW, 11/3/2021 1:05 AM    Indication: s/p lung transplant    Comparison: 11/2/2021    Findings:   Tracheostomy distal tip projects over the high thoracic trachea.  Surgical changes of lung transplant with clamshell sternotomy wires.  Cardiomediastinal silhouette is prominent similar to prior. Small  bilateral pleural effusions similar to prior.  Slightly increased hazy  bibasilar opacities. No pneumothorax. No acute osseous abnormality is.      Impression    Impression: Small bilateral pleural effusions with slightly increased  hazy bibasilar airspace opacities.    I have personally reviewed the examination and initial interpretation  and I agree with the findings.    FITO PERALES MD         SYSTEM ID:  [id number]     11/3, 11/2 CXRs:  Small bilateral pleural effusions with slightly increased or unchanged hazy bibasilar airspace opacities.  11/2 Head CT:  No new acute intracranial abnormalities. Scattered subacute multifocal infarctions in both cerebral hemispheres, in similar distribution to prior MRI. No CT evidence for new focal cortical hypoattenuations to suggest acute infarction or new areas of anoxic brain damage. No intracranial hemorrhage.  11/2 Chest / abdm CT:   1.  The stomach is distended with hyperdense fluid suggestive of blood products. No contrast extravasation in the gastrointestinal tract to indicate ongoing hemorrhage.  2.  Increased bilateral pleural effusions compared to CT 10/25/2021, moderate on the left, small on the right. Bibasilar atelectasis with an area of hypoenhancing parenchyma in the left lower lobe, suspicious for infection.  3.  Dilated small bowel without definite transition point suggestive of ileus.  4.  Decreased nonocclusive thrombus in the right internal jugular vein.  5.  Numerous nondisplaced anterior rib fractures bilaterally. Small fluid collections deep to the pectoralis major muscles bilaterally, increased/new compared to the previous CT and likely representing small seromas/hematomas.   11/1, 10/31, 10/30 CXRs:  Slightly increased hazy retrocardiac airspace opacity.  Hazy right basilar airspace opacities are unchanged, likely atelectasis.  Mild pulmonary congestion.  Small bilateral pleural effusions.  Stable postsurgical chest.  Tracheostomy tube now demonstrated. Gastric and feeding tubes removed.   10/28,  10/27 CXRs:  Removal of bilateral chest tubes without pneumothorax.  Small left and trace right pleural effusions and postoperative atelectasis.  Mild pulmonary vascular congestion.   10/26 Brain MRI:  1. Evolving acute age multifocal acute infarctions in both cerebral hemispheres and left cerebellum. No new areas of infarction when compared with prior MRI brain on 10/23/2021.  2. Minimally increased punctate regions of susceptibility effect within areas of infarction, corresponding to petechial hemorrhage within previously identified multifocal acute infarctions.  3. Head MRA demonstrates no definite aneurysm or stenosis of the major intracranial arteries.  4. Neck MRA demonstrates patent major cervical arteries.   10/26 CXR:  Improved left pleural effusion and associated lower lobe opacity.  Stable small right pleural effusion.  No new focal airspace opacities.  Stable support devices.  10/26 Head CT:  This exam is significantly limited by motion and streak artifact.  MRI brain 10/23/2021 demonstrated multiple acute/subacute infarcts throughout the bilateral cerebral hemispheres and left cerebellum. Some of these acute/subacute infarcts demonstrated microhemorrhage on SWI.  Left posterior parietal lobe acute/subacute infarct is identified on current exam. Remaining previously seen infarcts are not identified possibly obscured by artifact. It is possible that a component of the abnormal signal on MRI may of been attributed to posterior reversible encephalopathy syndrome in addition to acute/subacute infarcts.  No CT evidence for a new cortical hypoattenuation to indicate a new acute infarct on current exam.  No definite acute intracranial hemorrhage given artifact.  No midline shift.  10/25 ABX:  Uncomplicated feeding tube placement with tip in the third portion of the duodenum.  10/25 Head / neck CTA:  HEAD CTA:  1.  No intracranial arterial large vessel occlusion.  2.  No significant stenosis/occlusion.  NECK  CTA:  1.  Right vertebral artery mild stenosis.  2.  Left vertebral artery origin severe high-grade stenosis.  3.  Otherwise, no significant stenosis/occlusion. No dissection.  10/25 PM CXR:  1. Interval removal of apically directed chest tubes. No appreciable pneumothorax.  2. Cardiomegaly with persistent bilateral interstitial opacities suggestive for pulmonary edema versus atelectasis.  3. Small left pleural effusion with increased left basilar atelectasis versus consolidation.  4. Additional lines and tubes in stable position.   10/25 Chest / abdm CT:  1. Decreased (versus 10/22/21 CT scan) consolidative opacities in the posterolateral left upper lobe and bilateral lung bases, which may represent improving infection and/or atelectasis.  2. Nonocclusive venous thrombosis of the low right internal jugular vein.  3. Decreased trace right hydropneumothorax and small left pleural effusion.  4. Support devices are in similar position, including the right inferior chest tube tip near the 8th-9th intercostal space, not definitively in the pleural space.  5. Decreased anterior chest subcutaneous/intramuscular emphysema.  6. The remainder of the exam is not significantly changed since 10/22/2021.  10/25 CXR:  1. No significant interval change and stable support devices.  2. Small left pleural effusion. Bibasilar atelectasis. Stable bilateral interstitial and airspace opacities.  3. No pneumothorax.  10/24 CXR:  1.  Stable postsurgical bilateral lung transplant changes with mild  perihilar interstitial opacities suggestive for pulmonary edema versus atelectasis.  2.  Support devices in stable position. No appreciable pneumothorax.   10/23 TTE:  No evidence of endocarditis. Normal biventricular function.  No valvular disease.  10/23 Brain MRI:  Multifocal subacute infarcts within both cerebral hemispheres and left cerebellum all of which appear present on prior head CT 10/22/2021.  Minimal petechial hemorrhage associated  with the infarct in the left occipital lobe.  10/23 ABXs:  Gastric tube tip and sidehole projected over the stomach.  Nonobstructive bowel gas pattern.  10/23 TTE:  Valves cannot be assessed in this echo due to bandages on the chest.   10/23 CXR:  1. Postsurgical changes bilateral lung transplant.  2. Stable support devices.  3. Small left pleural effusion. Bibasilar atelectasis. Stable bilateral interstitial and airspace opacities.  4. No appreciable pneumothorax.  10/22 Chest / abdm CT angiogram:  1. No acute pulmonary embolus.  2. Surgical changes of bilateral lung transplantation are stable compared to exam performed earlier today. Similar appearance of opacities throughout both lungs, likely representing atelectasis and/or consolidation.  3. No acute abnormality in the abdomen or pelvis.     10/22 Chest CT:  1. Postoperative changes of bilateral lung transplantation with supportive devices in stable positioning.  2. Multifocal consolidative opacities involving the left upper, left lower, and right lower lobes with scattered mucous plugging.  Concerning for aspiration or infection.  3. Multifocal diffuse groundglass opacities with mild interlobular septal thickening, compatible with pulmonary edema.  4. Extensive subcutaneous emphysema of the anterior chest wall and right neck.

## 2021-11-03 NOTE — PROGRESS NOTES
[facility] Brief Operative Note    Pre-operative diagnosis: GIB (gastrointestinal bleeding) [K92.2]   Post-operative diagnosis Upper GI bleeding, no active bleeding   Procedure: Procedure(s):  ESOPHAGOGASTRODUODENOSCOPY, WITH HEMORRHAGE CONTROL   Surgeon: Dr. Ortiz (attending)  Irwin Funk MD       Estimated blood loss: None    Specimens: None   Findings:   - Significant hematin (old clot) in the body and fundus  - No active bleeding  - The previous area of 2 clips was identified, without bleeding, we clipped this two additional times  - PEGJ tube in place without issues    Plan:  - Continue IV PPI  - Anticipate repeat EGD in 2-3 days with erythromycin prior to procedure  - If he has worsening GI bleeding Dr. Ortiz would plan for repeat EGD  - Family (spouse, Peg) updated

## 2021-11-03 NOTE — PROGRESS NOTES
Patient Name: Edson Thornton  Medical Record Number: [id number]  Today's Date: 11/3/2021    Procedure: Bilateral non-tunneled chest tube placement  Proceduralist: Art Becerra PA-C  Pathology present: N/A  120 ml of Armando Red Blood taken off and sent to lab.  Procedure Start: 1434  Procedure end: 1500  Sedation medications administered: N/A     Report given to: Nick PACHECO RN  : N/A    Other Notes: Pt arrived to IR room N/A, stayed in 4E from 4E. Consent reviewed. Pt denies any questions or concerns regarding procedure. Pt positioned Left Lateral Up and monitored per protocol. Pt tolerated procedure without any noted complications. Pt transferred back to N/A never left 4E.

## 2021-11-03 NOTE — PLAN OF CARE
Major Shift Events:  Patient neuro status unchanged, moves legs spontaneously, opens eyes spontaneously and grimaces to pain. In NSR-ST, BP maintained without pressors. Vent settings unchanged, FiO2 decreased to 70%. Scant secretions via ETT. Adequate UOP via metzger. Around 0400 patient began to have maroon stools, AM hgb stable. CVTS notified of maroon stool, morning hgb recheck ordered. K slightly elevated, CVTS notified, all other labs stable.   Plan: Bedside EGD, continue to monitor hemodynamic status and for s/sx of bleeding.  For vital signs and complete assessments, please see documentation flowsheets.

## 2021-11-03 NOTE — PROGRESS NOTES
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 Corral. 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    OVERNIGHT EVENTS:  Developed melena. Hemoglobin remained stable     TODAY'S PROGRESS:   IR consult for pleural effusion  EGD today by GI   Continue insulin gtt when TF resumed  No PST until oxygenation better    Metoprolol 12.5mg BID   Hold statin and make APAP prn for elevated LFTs  Diamox and lasix today for diuresis  Will dw GI regarding TF     PLAN:  Neuro/ pain/ sedation:  Acute postoperative pain  Anxiety and depression  Acute to subacute CVA, b/l cerebral hemispheres and L cerebellum, suspect embolic  Encephalopathy and diffuse weakness - improving slightly   R ear lateral canal floor excoriation with bleeding - improving    - Pain and sedation: none  - make APAP prn in setting of elevated LFTs, lido patch   - appreciate ENT  - appreciate neuro  - CT head yesterday no new findings     Pulmonary care:   SALTY on home CPAP  Acute hypoxic respiratory failure s/p b/l lung transplant 10/16/21  S/p tracheostomy 10/29  Empyema with Candida    - mechanically ventilated via trach  - IR for drain   - Levalbuterol nebs and Mucomyst, chest PT  - Daily CXR  - appreciate Pulmonary    Cardiovascular:    HTN  Afib   PFO  Vasoplegic shock in setting of hypovolemia   - Monitor hemodynamic status.   - MAP goal <100, SBP <140  - metoprolol 12.5 BID    - prn labetalol  - hold statin 2/2 elevated LFTs  - Amiodarone 200 mg daily   - low-intensity heparin gtt - hold in setting of gi bleed    GI care/ Nutrition:   Elevated LFTs - recurrent  GI bleed 2/2 NG trauma, now recurrent causing code in setting of recent GJ tube  placement  Moderate malnutrition in the context of acute on chronic illness  - Diet: TF held, will dw GI   - EGD today    - PPI gtt  - appreciate GI     Renal/ Fluid Balance/ Electrolytes:   Acute kidney injury, recurrent, now with azotemia   Hypernatremia, improved with FWF  Hyperkalemia, improving   BL creat appears to be ~ 0.7  - free water flushes 75 q4  - 500 q8 diamox x3d and 20 lasix now     - Strict I/O, daily weights  - Avoid/limit nephrotoxins as able  - Replete lytes PRN per protocol     Endocrine:    Stress induced hyperglycemia with steroid-induced component, on DM2  Hypothyroidism  RA  Preop A1c 6.6  - insulin gtt, lantus 40u   - Goal BG <180 for optimal healing  - continue home synthroid     ID/ Antibiotics:  Immunosuppression s/p transplant  Candidal infection of donor lungs (likely source), pleural fluid, and fungemia   - NGTD CSF. Last + blood cx 10/22. No vegetations on valves per TC 10/23  - continue fluconazole pending clinical course  - completed empiric meropenem x5d   - Nystatin, Acyclovir, bactrim   - Tacrolimus (via g tube), prednisone   - Follow cultures   - appreciate transplant ID, ophthalmology   - Continue to monitor fever curve, WBC and inflammatory markers as appropriate     Heme:     Acute blood loss anemia secondary to surgery and GI bleed, and anemia related to critical illness and iron deficiency    Hypogammaglobulinemia s/p IVIG  Thrombocytopenia, HIT negative - resolved   Lactic acidosis, resolved   - iron studies consistent with iron deficiency, will not treat for now given infection  - no further product required after GI bleed yesterday   - CBC q12 today     MSK/ Skin:  Clamshell surgical incision  - Sternal precautions  - Postoperative incision management per protocol  - PT/OT/CR     Prophylaxis:    - Mechanical prophylaxis for DVT: SCDs  - Chemical DVT prophylaxis: held 2/2 GI bleed   - PPI for 30 days postoperatively     Lines/ tubes/ drains:  - trach  - peg-j  - Watson  10/25    - PIVs  - left radial a line  - right femoral MAC line    Disposition:  - CVICU     Patient seen, findings and plan discussed with CVICU staff.     Altagracia Allan MD  Surgery Resident PGY-3  Pg [phone]    ====================================    SUBJECTIVE:   No further transfusions after code yesterday. Developing melena.     OBJECTIVE:   1. VITAL SIGNS:   Temp:  [98.6  F (37  C)-101.3  F (38.5  C)] 98.6  F (37  C)  Pulse:  [] 84  Resp:  [21-30] 30  BP: ()/(22-91) 94/63  MAP:  [38 mmHg-228 mmHg] 90 mmHg  Arterial Line BP: ()/(-) 143/74  FiO2 (%):  [50 %-100 %] 70 %  SpO2:  [86 %-100 %] 98 %  Ventilation Mode: CMV/AC  (Continuous Mandatory Ventilation/ Assist Control)  FiO2 (%): 70 %  Rate Set (breaths/minute): 16 breaths/min  Tidal Volume Set (mL): 400 mL  PEEP (cm H2O): 10 cmH2O  Oxygen Concentration (%): 70 %  Resp: 30    2. INTAKE/ OUTPUT:   I/O last 3 completed shifts:  In: 3371.15 [I.V.:849.15; NG/GT:350; IV Piggyback:500]  Out: 1920 [Urine:1570; Emesis/NG output:150; Stool:200]    3. PHYSICAL EXAMINATION:   General: NAD  Neuro: opens eyes to voice, moving head. Wiggles toes BLE to command, no movement BUE to noxious, grimacing only to noxious of L hand   Resp: vented via trach, nonlabored   CV: RRR on tele   Abdomen: soft, nontender, rounded   Incisions: c/d/i  Extremities: warm and well perfused, no edema    4. INVESTIGATIONS:   Arterial Blood Gases   Recent Labs   Lab 11/03/21  0407 11/02/21  2206 11/02/21  1822 11/02/21  1607   PH 7.54* 7.50* 7.51* 7.49*   PCO2 35 40 40 43   PO2 117* 142* 91 66*   HCO3 30* 31* 32* 33*     Complete Blood Count   Recent Labs   Lab 11/03/21  0407 11/02/21  2206 11/02/21  1607 11/02/21  1400 11/02/21  1302 11/02/21  1053   WBC 35.1* 36.4* 40.1*  --   --  57.4*  57.4*   HGB 10.1* 10.1* 10.5* 9.2*   < > 8.8*    151 131*  --   --  244    < > = values in this interval not displayed.     Basic Metabolic Panel  Recent Labs   Lab 11/03/21  0418  11/03/21  0407 11/03/21  0208 11/02/21  2205 11/02/21  2015 11/02/21  1609 11/02/21  1400 11/02/21  1305 11/02/21  1302   NA  --  148*  --  148*  --   --  150*  --  152*   POTASSIUM  --  5.3 5.2 5.4*  --   --  4.4   < > 4.6   CHLORIDE  --  116*  --  116*  --   --  115*  --  116*   CO2  --  27  --  29  --   --  32  --  32   BUN  --  109*  --  105*  --   --  97*  --  93*   CR  --  1.27*  --  1.22  --   --  1.30*  --  1.38*   * 138*  --  167* 165*   < > 121*  111*   < > 152*    < > = values in this interval not displayed.     Liver Function Tests  Recent Labs   Lab 11/03/21  0407 11/02/21  1211 11/02/21  1053 11/02/21  0908 11/02/21  0832 11/02/21  0815 11/02/21  0556   * 450*  --   --  68* 33  --    * 392*  --   --  72* 50  --    ALKPHOS 146 182*  --   --  139 110  --    BILITOTAL 0.5 0.6  --   --  0.3 0.2  --    ALBUMIN 1.8* 1.5*  --   --  1.4* 1.1*  --    INR 1.36*  --  1.55* 1.56* 1.38*  --    < >    < > = values in this interval not displayed.     Pancreatic Enzymes  No lab results found in last 7 days.  Coagulation Profile  Recent Labs   Lab 11/03/21  0407 11/02/21  1053 11/02/21  0927 11/02/21  0908 11/02/21  0832   INR 1.36* 1.55*  --  1.56* 1.38*   PTT  --  34 32  --  50*        =========================================

## 2021-11-03 NOTE — PROCEDURES
Edson Thornton  [id number]    Completed ultrasound guided bedside placement of LEFT-sided chest tube.  A 14 Khmer non-locking drain placed and connected to a closed-system drainage container under water seal.  A sample of 120 mL dark red colored fluid was collected.  No immediate complication.  Dx:  Pleural effusion.  Mariam.    Evaluation for ultrasound guided placement of RIGHT-sided chest tube.  Bedside ultrasound shows very little effusion.  No attempt at chest tube placement made.  Dx:  Pleural effusion.  Mariam.

## 2021-11-04 ENCOUNTER — APPOINTMENT (OUTPATIENT)
Dept: GENERAL RADIOLOGY | Facility: CLINIC | Age: 56
End: 2021-11-04
Attending: STUDENT IN AN ORGANIZED HEALTH CARE EDUCATION/TRAINING PROGRAM
Payer: COMMERCIAL

## 2021-11-04 ENCOUNTER — APPOINTMENT (OUTPATIENT)
Dept: ULTRASOUND IMAGING | Facility: CLINIC | Age: 56
End: 2021-11-04
Attending: STUDENT IN AN ORGANIZED HEALTH CARE EDUCATION/TRAINING PROGRAM
Payer: COMMERCIAL

## 2021-11-04 LAB
ALBUMIN SERPL-MCNC: 1.7 G/DL (ref 3.4–5)
ALP SERPL-CCNC: 129 U/L (ref 40–150)
ALT SERPL W P-5'-P-CCNC: 262 U/L (ref 0–70)
ANION GAP SERPL CALCULATED.3IONS-SCNC: 4 MMOL/L (ref 3–14)
AST SERPL W P-5'-P-CCNC: 117 U/L (ref 0–45)
BACTERIA SPT CULT: ABNORMAL
BACTERIA SPT CULT: ABNORMAL
BASE EXCESS BLDA CALC-SCNC: 1.4 MMOL/L (ref -9–1.8)
BASE EXCESS BLDA CALC-SCNC: 1.5 MMOL/L (ref -9–1.8)
BILIRUB SERPL-MCNC: 0.4 MG/DL (ref 0.2–1.3)
BUN SERPL-MCNC: 80 MG/DL (ref 7–30)
BUN SERPL-MCNC: 86 MG/DL (ref 7–30)
BUN SERPL-MCNC: 86 MG/DL (ref 7–30)
CALCIUM SERPL-MCNC: 8.7 MG/DL (ref 8.5–10.1)
CHLORIDE BLD-SCNC: 121 MMOL/L (ref 94–109)
CO2 SERPL-SCNC: 23 MMOL/L (ref 20–32)
CO2 SERPL-SCNC: 23 MMOL/L (ref 20–32)
CO2 SERPL-SCNC: 26 MMOL/L (ref 20–32)
CREAT SERPL-MCNC: 0.88 MG/DL (ref 0.66–1.25)
EBV DNA # SPEC NAA+PROBE: <390 CPY/ML
EBV DNA SPEC NAA+PROBE-LOG#: <2.6 LOG
EBV DNA SPEC QL NAA+PROBE: NOT DETECTED
ERYTHROCYTE [DISTWIDTH] IN BLOOD BY AUTOMATED COUNT: 18.8 % (ref 10–15)
ERYTHROCYTE [DISTWIDTH] IN BLOOD BY AUTOMATED COUNT: 19.2 % (ref 10–15)
GFR SERPL CREATININE-BSD FRML MDRD: >90 ML/MIN/1.73M2
GLUCOSE BLD-MCNC: 138 MG/DL (ref 70–99)
GLUCOSE BLD-MCNC: 138 MG/DL (ref 70–99)
GLUCOSE BLD-MCNC: 169 MG/DL (ref 70–99)
GLUCOSE BLDC GLUCOMTR-MCNC: 101 MG/DL (ref 70–99)
GLUCOSE BLDC GLUCOMTR-MCNC: 103 MG/DL (ref 70–99)
GLUCOSE BLDC GLUCOMTR-MCNC: 111 MG/DL (ref 70–99)
GLUCOSE BLDC GLUCOMTR-MCNC: 123 MG/DL (ref 70–99)
GLUCOSE BLDC GLUCOMTR-MCNC: 139 MG/DL (ref 70–99)
GLUCOSE BLDC GLUCOMTR-MCNC: 155 MG/DL (ref 70–99)
GLUCOSE BLDC GLUCOMTR-MCNC: 167 MG/DL (ref 70–99)
GLUCOSE BLDC GLUCOMTR-MCNC: 175 MG/DL (ref 70–99)
GLUCOSE BLDC GLUCOMTR-MCNC: 186 MG/DL (ref 70–99)
GRAM STAIN RESULT: ABNORMAL
GRAM STAIN RESULT: ABNORMAL
HCO3 BLD-SCNC: 25 MMOL/L (ref 21–28)
HCO3 BLD-SCNC: 25 MMOL/L (ref 21–28)
HCT VFR BLD AUTO: 29 % (ref 40–53)
HCT VFR BLD AUTO: 29.1 % (ref 40–53)
HGB BLD-MCNC: 9.1 G/DL (ref 13.3–17.7)
HGB BLD-MCNC: 9.1 G/DL (ref 13.3–17.7)
INR PPP: 1.32 (ref 0.85–1.15)
LACTATE SERPL-SCNC: 0.8 MMOL/L (ref 0.7–2)
LACTATE SERPL-SCNC: 1.4 MMOL/L (ref 0.7–2)
MAGNESIUM SERPL-MCNC: 2 MG/DL (ref 1.6–2.3)
MCH RBC QN AUTO: 29.4 PG (ref 26.5–33)
MCH RBC QN AUTO: 29.9 PG (ref 26.5–33)
MCHC RBC AUTO-ENTMCNC: 31.3 G/DL (ref 31.5–36.5)
MCHC RBC AUTO-ENTMCNC: 31.4 G/DL (ref 31.5–36.5)
MCV RBC AUTO: 94 FL (ref 78–100)
MCV RBC AUTO: 95 FL (ref 78–100)
O2/TOTAL GAS SETTING VFR VENT: 40 %
O2/TOTAL GAS SETTING VFR VENT: 40 %
PCO2 BLD: 34 MM HG (ref 35–45)
PCO2 BLD: 35 MM HG (ref 35–45)
PH BLD: 7.46 [PH] (ref 7.35–7.45)
PH BLD: 7.47 [PH] (ref 7.35–7.45)
PHOSPHATE SERPL-MCNC: 3.7 MG/DL (ref 2.5–4.5)
PLATELET # BLD AUTO: 173 10E3/UL (ref 150–450)
PLATELET # BLD AUTO: 182 10E3/UL (ref 150–450)
PO2 BLD: 100 MM HG (ref 80–105)
PO2 BLD: 95 MM HG (ref 80–105)
POTASSIUM BLD-SCNC: 3.5 MMOL/L (ref 3.4–5.3)
POTASSIUM BLD-SCNC: 4 MMOL/L (ref 3.4–5.3)
POTASSIUM BLD-SCNC: 4 MMOL/L (ref 3.4–5.3)
PROT SERPL-MCNC: 4.8 G/DL (ref 6.8–8.8)
RADIOLOGIST FLAGS: ABNORMAL
RBC # BLD AUTO: 3.04 10E6/UL (ref 4.4–5.9)
RBC # BLD AUTO: 3.09 10E6/UL (ref 4.4–5.9)
SODIUM SERPL-SCNC: 148 MMOL/L (ref 133–144)
SODIUM SERPL-SCNC: 148 MMOL/L (ref 133–144)
SODIUM SERPL-SCNC: 151 MMOL/L (ref 133–144)
TACROLIMUS BLD-MCNC: 10 UG/L (ref 5–15)
TME LAST DOSE: NORMAL H
TME LAST DOSE: NORMAL H
WBC # BLD AUTO: 21.4 10E3/UL (ref 4–11)
WBC # BLD AUTO: 25.1 10E3/UL (ref 4–11)

## 2021-11-04 PROCEDURE — 250N000013 HC RX MED GY IP 250 OP 250 PS 637

## 2021-11-04 PROCEDURE — 999N000015 HC STATISTIC ARTERIAL MONITORING DAILY

## 2021-11-04 PROCEDURE — 85610 PROTHROMBIN TIME: CPT | Performed by: STUDENT IN AN ORGANIZED HEALTH CARE EDUCATION/TRAINING PROGRAM

## 2021-11-04 PROCEDURE — 258N000003 HC RX IP 258 OP 636: Performed by: NURSE PRACTITIONER

## 2021-11-04 PROCEDURE — 84100 ASSAY OF PHOSPHORUS: CPT | Performed by: STUDENT IN AN ORGANIZED HEALTH CARE EDUCATION/TRAINING PROGRAM

## 2021-11-04 PROCEDURE — 200N000002 HC R&B ICU UMMC

## 2021-11-04 PROCEDURE — 250N000009 HC RX 250: Performed by: STUDENT IN AN ORGANIZED HEALTH CARE EDUCATION/TRAINING PROGRAM

## 2021-11-04 PROCEDURE — 250N000013 HC RX MED GY IP 250 OP 250 PS 637: Performed by: STUDENT IN AN ORGANIZED HEALTH CARE EDUCATION/TRAINING PROGRAM

## 2021-11-04 PROCEDURE — 83605 ASSAY OF LACTIC ACID: CPT

## 2021-11-04 PROCEDURE — 250N000013 HC RX MED GY IP 250 OP 250 PS 637: Performed by: THORACIC SURGERY (CARDIOTHORACIC VASCULAR SURGERY)

## 2021-11-04 PROCEDURE — 250N000011 HC RX IP 250 OP 636

## 2021-11-04 PROCEDURE — 71045 X-RAY EXAM CHEST 1 VIEW: CPT

## 2021-11-04 PROCEDURE — 85014 HEMATOCRIT: CPT

## 2021-11-04 PROCEDURE — 74018 RADEX ABDOMEN 1 VIEW: CPT | Mod: 26 | Performed by: RADIOLOGY

## 2021-11-04 PROCEDURE — 94640 AIRWAY INHALATION TREATMENT: CPT | Mod: 76

## 2021-11-04 PROCEDURE — 80069 RENAL FUNCTION PANEL: CPT

## 2021-11-04 PROCEDURE — 82803 BLOOD GASES ANY COMBINATION: CPT | Performed by: ANESTHESIOLOGY

## 2021-11-04 PROCEDURE — 0W9930Z DRAINAGE OF RIGHT PLEURAL CAVITY WITH DRAINAGE DEVICE, PERCUTANEOUS APPROACH: ICD-10-PCS | Performed by: PHYSICIAN ASSISTANT

## 2021-11-04 PROCEDURE — 250N000012 HC RX MED GY IP 250 OP 636 PS 637: Performed by: PHYSICIAN ASSISTANT

## 2021-11-04 PROCEDURE — 250N000011 HC RX IP 250 OP 636: Performed by: NURSE PRACTITIONER

## 2021-11-04 PROCEDURE — 80197 ASSAY OF TACROLIMUS: CPT | Performed by: STUDENT IN AN ORGANIZED HEALTH CARE EDUCATION/TRAINING PROGRAM

## 2021-11-04 PROCEDURE — 80048 BASIC METABOLIC PNL TOTAL CA: CPT

## 2021-11-04 PROCEDURE — 74018 RADEX ABDOMEN 1 VIEW: CPT

## 2021-11-04 PROCEDURE — 36569 INSJ PICC 5 YR+ W/O IMAGING: CPT

## 2021-11-04 PROCEDURE — 99207 PR NON-BILLABLE SERV PER CHARTING: CPT | Mod: 24 | Performed by: PHYSICIAN ASSISTANT

## 2021-11-04 PROCEDURE — 94668 MNPJ CHEST WALL SBSQ: CPT

## 2021-11-04 PROCEDURE — 272N000452 HC KIT SHRLOCK 5FR POWER PICC TRIPLE LUMEN

## 2021-11-04 PROCEDURE — 999N000157 HC STATISTIC RCP TIME EA 10 MIN

## 2021-11-04 PROCEDURE — 85027 COMPLETE CBC AUTOMATED: CPT

## 2021-11-04 PROCEDURE — 99233 SBSQ HOSP IP/OBS HIGH 50: CPT | Mod: 24 | Performed by: INTERNAL MEDICINE

## 2021-11-04 PROCEDURE — 71045 X-RAY EXAM CHEST 1 VIEW: CPT | Mod: 26 | Performed by: RADIOLOGY

## 2021-11-04 PROCEDURE — 93970 EXTREMITY STUDY: CPT

## 2021-11-04 PROCEDURE — 82040 ASSAY OF SERUM ALBUMIN: CPT | Performed by: STUDENT IN AN ORGANIZED HEALTH CARE EDUCATION/TRAINING PROGRAM

## 2021-11-04 PROCEDURE — 94640 AIRWAY INHALATION TREATMENT: CPT

## 2021-11-04 PROCEDURE — 83735 ASSAY OF MAGNESIUM: CPT | Performed by: STUDENT IN AN ORGANIZED HEALTH CARE EDUCATION/TRAINING PROGRAM

## 2021-11-04 PROCEDURE — 94003 VENT MGMT INPAT SUBQ DAY: CPT

## 2021-11-04 PROCEDURE — 250N000011 HC RX IP 250 OP 636: Performed by: STUDENT IN AN ORGANIZED HEALTH CARE EDUCATION/TRAINING PROGRAM

## 2021-11-04 PROCEDURE — 99291 CRITICAL CARE FIRST HOUR: CPT | Mod: 24 | Performed by: ANESTHESIOLOGY

## 2021-11-04 PROCEDURE — 93970 EXTREMITY STUDY: CPT | Mod: 26 | Performed by: RADIOLOGY

## 2021-11-04 PROCEDURE — 250N000011 HC RX IP 250 OP 636: Performed by: THORACIC SURGERY (CARDIOTHORACIC VASCULAR SURGERY)

## 2021-11-04 PROCEDURE — C9113 INJ PANTOPRAZOLE SODIUM, VIA: HCPCS | Performed by: NURSE PRACTITIONER

## 2021-11-04 RX ORDER — DEXTROSE MONOHYDRATE 100 MG/ML
INJECTION, SOLUTION INTRAVENOUS CONTINUOUS PRN
Status: DISCONTINUED | OUTPATIENT
Start: 2021-11-04 | End: 2021-11-04

## 2021-11-04 RX ORDER — HEPARIN SODIUM 10000 [USP'U]/100ML
400 INJECTION, SOLUTION INTRAVENOUS CONTINUOUS
Status: DISCONTINUED | OUTPATIENT
Start: 2021-11-04 | End: 2021-11-05

## 2021-11-04 RX ORDER — METOPROLOL TARTRATE 25 MG/1
25 TABLET, FILM COATED ORAL 2 TIMES DAILY
Status: DISCONTINUED | OUTPATIENT
Start: 2021-11-04 | End: 2021-11-06

## 2021-11-04 RX ORDER — HEPARIN SODIUM 10000 [USP'U]/100ML
0-5000 INJECTION, SOLUTION INTRAVENOUS CONTINUOUS
Status: DISCONTINUED | OUTPATIENT
Start: 2021-11-04 | End: 2021-11-04

## 2021-11-04 RX ORDER — HEPARIN SODIUM,PORCINE 10 UNIT/ML
5-20 VIAL (ML) INTRAVENOUS EVERY 24 HOURS
Status: DISCONTINUED | OUTPATIENT
Start: 2021-11-04 | End: 2021-12-13 | Stop reason: HOSPADM

## 2021-11-04 RX ORDER — POTASSIUM CHLORIDE 1.5 G/1.58G
20 POWDER, FOR SOLUTION ORAL ONCE
Status: COMPLETED | OUTPATIENT
Start: 2021-11-04 | End: 2021-11-04

## 2021-11-04 RX ORDER — DEXTROSE MONOHYDRATE 25 G/50ML
25-50 INJECTION, SOLUTION INTRAVENOUS
Status: DISCONTINUED | OUTPATIENT
Start: 2021-11-04 | End: 2021-11-04

## 2021-11-04 RX ORDER — LIDOCAINE 40 MG/G
CREAM TOPICAL
Status: DISCONTINUED | OUTPATIENT
Start: 2021-11-04 | End: 2021-11-04

## 2021-11-04 RX ORDER — FUROSEMIDE 10 MG/ML
40 INJECTION INTRAMUSCULAR; INTRAVENOUS ONCE
Status: COMPLETED | OUTPATIENT
Start: 2021-11-04 | End: 2021-11-04

## 2021-11-04 RX ORDER — HEPARIN SODIUM 5000 [USP'U]/.5ML
5000 INJECTION, SOLUTION INTRAVENOUS; SUBCUTANEOUS EVERY 8 HOURS
Status: DISCONTINUED | OUTPATIENT
Start: 2021-11-04 | End: 2021-11-04

## 2021-11-04 RX ORDER — NICOTINE POLACRILEX 4 MG
15-30 LOZENGE BUCCAL
Status: DISCONTINUED | OUTPATIENT
Start: 2021-11-04 | End: 2021-11-04

## 2021-11-04 RX ORDER — HEPARIN SODIUM,PORCINE 10 UNIT/ML
5-20 VIAL (ML) INTRAVENOUS
Status: DISCONTINUED | OUTPATIENT
Start: 2021-11-04 | End: 2021-12-13 | Stop reason: HOSPADM

## 2021-11-04 RX ADMIN — LIDOCAINE 2 PATCH: 560 PATCH PERCUTANEOUS; TOPICAL; TRANSDERMAL at 07:55

## 2021-11-04 RX ADMIN — Medication 12.5 MG: at 07:55

## 2021-11-04 RX ADMIN — TACROLIMUS 0.5 MG: 0.5 CAPSULE ORAL at 07:55

## 2021-11-04 RX ADMIN — LEVALBUTEROL HYDROCHLORIDE 1.25 MG: 1.25 SOLUTION RESPIRATORY (INHALATION) at 11:58

## 2021-11-04 RX ADMIN — AMIODARONE HYDROCHLORIDE 200 MG: 200 TABLET ORAL at 07:55

## 2021-11-04 RX ADMIN — MULTIVIT AND MINERALS-FERROUS GLUCONATE 9 MG IRON/15 ML ORAL LIQUID 15 ML: at 07:55

## 2021-11-04 RX ADMIN — ACETYLCYSTEINE 2 ML: 200 SOLUTION ORAL; RESPIRATORY (INHALATION) at 11:58

## 2021-11-04 RX ADMIN — SODIUM CHLORIDE 8 MG/HR: 9 INJECTION, SOLUTION INTRAVENOUS at 13:24

## 2021-11-04 RX ADMIN — CALCIUM CARBONATE 600 MG (1,500 MG)-VITAMIN D3 400 UNIT TABLET 1 TABLET: at 17:48

## 2021-11-04 RX ADMIN — ACETYLCYSTEINE 2 ML: 200 SOLUTION ORAL; RESPIRATORY (INHALATION) at 15:48

## 2021-11-04 RX ADMIN — ACETYLCYSTEINE 2 ML: 200 SOLUTION ORAL; RESPIRATORY (INHALATION) at 20:42

## 2021-11-04 RX ADMIN — MYCOPHENOLATE MOFETIL 1000 MG: 200 POWDER, FOR SUSPENSION ORAL at 19:26

## 2021-11-04 RX ADMIN — SODIUM CHLORIDE, PRESERVATIVE FREE 5 ML: 5 INJECTION INTRAVENOUS at 18:06

## 2021-11-04 RX ADMIN — NYSTATIN 1000000 UNITS: 500000 SUSPENSION ORAL at 17:48

## 2021-11-04 RX ADMIN — METOPROLOL TARTRATE 25 MG: 25 TABLET, FILM COATED ORAL at 19:27

## 2021-11-04 RX ADMIN — PREDNISOLONE 15 MG: 15 SOLUTION ORAL at 07:57

## 2021-11-04 RX ADMIN — NYSTATIN 1000000 UNITS: 500000 SUSPENSION ORAL at 19:27

## 2021-11-04 RX ADMIN — LEVALBUTEROL HYDROCHLORIDE 1.25 MG: 1.25 SOLUTION RESPIRATORY (INHALATION) at 08:09

## 2021-11-04 RX ADMIN — CALCIUM CARBONATE 600 MG (1,500 MG)-VITAMIN D3 400 UNIT TABLET 1 TABLET: at 07:55

## 2021-11-04 RX ADMIN — NYSTATIN 1000000 UNITS: 500000 SUSPENSION ORAL at 07:55

## 2021-11-04 RX ADMIN — ACYCLOVIR 400 MG: 200 SUSPENSION ORAL at 07:56

## 2021-11-04 RX ADMIN — ACETAMINOPHEN 975 MG: 325 TABLET, FILM COATED ORAL at 19:27

## 2021-11-04 RX ADMIN — NYSTATIN 1000000 UNITS: 500000 SUSPENSION ORAL at 11:27

## 2021-11-04 RX ADMIN — ACETYLCYSTEINE 2 ML: 200 SOLUTION ORAL; RESPIRATORY (INHALATION) at 08:09

## 2021-11-04 RX ADMIN — HEPARIN SODIUM 850 UNITS/HR: 10000 INJECTION, SOLUTION INTRAVENOUS at 17:48

## 2021-11-04 RX ADMIN — LEVALBUTEROL HYDROCHLORIDE 1.25 MG: 1.25 SOLUTION RESPIRATORY (INHALATION) at 15:48

## 2021-11-04 RX ADMIN — FLUCONAZOLE IN SODIUM CHLORIDE 400 MG: 2 INJECTION, SOLUTION INTRAVENOUS at 13:27

## 2021-11-04 RX ADMIN — LEVOTHYROXINE SODIUM 25 MCG: 0.03 TABLET ORAL at 07:55

## 2021-11-04 RX ADMIN — FUROSEMIDE 40 MG: 10 INJECTION, SOLUTION INTRAVENOUS at 11:26

## 2021-11-04 RX ADMIN — ACETAMINOPHEN 975 MG: 325 TABLET, FILM COATED ORAL at 11:26

## 2021-11-04 RX ADMIN — TACROLIMUS 1 MG: 1 CAPSULE ORAL at 17:48

## 2021-11-04 RX ADMIN — MYCOPHENOLATE MOFETIL 1000 MG: 200 POWDER, FOR SUSPENSION ORAL at 07:56

## 2021-11-04 RX ADMIN — ACETAMINOPHEN 975 MG: 325 TABLET, FILM COATED ORAL at 03:42

## 2021-11-04 RX ADMIN — PREDNISOLONE 12.5 MG: 15 SOLUTION ORAL at 19:25

## 2021-11-04 RX ADMIN — LEVALBUTEROL HYDROCHLORIDE 1.25 MG: 1.25 SOLUTION RESPIRATORY (INHALATION) at 20:42

## 2021-11-04 RX ADMIN — POTASSIUM CHLORIDE 20 MEQ: 1.5 POWDER, FOR SOLUTION ORAL at 11:29

## 2021-11-04 RX ADMIN — ACYCLOVIR 400 MG: 200 SUSPENSION ORAL at 19:26

## 2021-11-04 RX ADMIN — LIDOCAINE HYDROCHLORIDE ANHYDROUS 1 ML: 10 INJECTION, SOLUTION INFILTRATION at 17:51

## 2021-11-04 RX ADMIN — HUMAN INSULIN 1.5 UNITS/HR: 100 INJECTION, SOLUTION SUBCUTANEOUS at 12:12

## 2021-11-04 ASSESSMENT — ACTIVITIES OF DAILY LIVING (ADL)
ADLS_ACUITY_SCORE: 24
ADLS_ACUITY_SCORE: 24
ADLS_ACUITY_SCORE: 22
ADLS_ACUITY_SCORE: 22
ADLS_ACUITY_SCORE: 24
ADLS_ACUITY_SCORE: 22
ADLS_ACUITY_SCORE: 24
ADLS_ACUITY_SCORE: 24
ADLS_ACUITY_SCORE: 22
ADLS_ACUITY_SCORE: 24
ADLS_ACUITY_SCORE: 22
ADLS_ACUITY_SCORE: 22
ADLS_ACUITY_SCORE: 24
ADLS_ACUITY_SCORE: 22
ADLS_ACUITY_SCORE: 24
ADLS_ACUITY_SCORE: 22
ADLS_ACUITY_SCORE: 22
ADLS_ACUITY_SCORE: 24
ADLS_ACUITY_SCORE: 22

## 2021-11-04 ASSESSMENT — MIFFLIN-ST. JEOR: SCORE: 1454

## 2021-11-04 NOTE — PROGRESS NOTES
Transplant Social Work Services Progress Note      Date of Initial Social Work Evaluation: Pt well known to writer through the transplant program.   Collaborated with: The patients step daughter, Zenia via phone.    Data: Zenia called writer today to discuss concerns about communication with the family and the medical team. She was frustrated that the team was not willing to speak with her about medical updates related to the pt, despite her being on his HCD. She is currently listed as an alternate to her mother, Peg.  Intervention: Writer listened and provided her with a brief update as I knew it. Writer also made a suggestion to hold a family care conference so that she and the rest of the family could ask questions and get a clearer picture of how Bret is doing.   Assessment: Zenia appreciated the conversation and agreed a family care conference was a good idea. Writer has asked the unit RNCC to set up one up for next week.   Education provided by SW: Educated Zenia on the need for a family spokes person and the use of a family care conference.   Plan:    Discharge Plans in Progress: NA    Barriers to d/c plan: Pt is still in critical condition in the ICU.     Follow up Plan: SW will continue to follow and assist with resources, education and support.    Leisa Navarro, [facility]  [phone]

## 2021-11-04 NOTE — PLAN OF CARE
Major Shift Events:  HR in NSR, MAPs stable, occasionally HTN w/ agitation. Inconsistently following commands. EGD done, moderate amount of clotty/black stools, G tube w/ small amount of bloody drainage. L pleural chest tube placed today, 500 mL serosanguinous output. Lasix x1, good UO.   Plan: Continue to monitor neuro status and hemodynamics.   For vital signs and complete assessments, please see documentation flowsheets.

## 2021-11-04 NOTE — PROGRESS NOTES
Brief GI Note  -Hgb stable past 24 hours, off pressors, without need for transfusion, serosanguinous output noted via GT  -Continue PPI ggt for 72 hours, then IV BID  -From GI standpoint ok to resume anticoagulation today  -Plan for repeat EGD at bedside tomorrow at 1000   -Please hold heparin at 0400/am dose   -Please ensure TF off 0400   -Please administer erythromycin 250 mg intravenously over 20 to 30 minutes, at 0900    ILIR Saldaña CNP on 11/4/2021 at 1:01 PM      Discussed with GI staff Dr Ortiz

## 2021-11-04 NOTE — PROGRESS NOTES
Pulmonary Medicine  Cystic Fibrosis - Lung Transplant Team  Progress Note  2021       Patient: Edson Thornton  MRN: [id number]  : 1965 (age 56 year old)  Transplant: 10/16/2021 (Lung), POD#19  Admission date: 2021    Assessment & Plan:     Edson Thornton is a 56 year old male with a PMH significant for NSIP/ILD, bronchiectasis, moderate PH, RA, SALTY, chronic HSV infection, hypogammaglobulinemia, steroid-induced diabetes, hypothyroidism, PFO, HTN, HLD, duodenal anomaly, anxiety, and depression.  Admitted on 21 from OSH for acute on chronic respiratory failure 2/2 ILD exacerbation, now s/p BSLT on 10/16/21. Surgery relatively uncomplicated but pt. with low cardiac index and PGD immediately post-op.  Caroline weaned off 10/22, remains intubated, s/p trach and PEG/J tube placement with thoracic surgery 10/29.  Post-op course complicated by encephalopathy and diffuse weakness, acute to subacute CVA, afib with RVR, BRENNAN, GI bleed due to NJ/OG trauma, Candidemia/Candida empyema, and recurrent fevers.  Neuro status remains tenuous, although some improvement noted since 10/29.  Code called  due to bradycardia/asystole (required 1 round of CPR, no medications) and then progressively hypotensive, noted to have acute drop in hemoglobin and bloody G tube output.  EGD with large amount of clotted blood in stomach and area of raised mucosa near PEG tube site that was clipped, no active bleeding.     Today's recommendations:   - PS trials as tolerated  - Diuresis per ICU team  - CXR daily with left chest tube in place  - Tacrolimus level therapeutic (10h level), no dose adjustment, daily levels (ordered)  - Prednisolone taper due  (ordered)  - Continue to hold Bactrim due to Cr/potassium trend, revisit in 1-2 days  - CMV and EBV ordered   - Continue acyclovir through POD #30  - Coccidioides Ag ordered 17  - Follow pending trach sputum and blood cultures (), pleural cultures (11/3)  - Continue  fluconazole at least through 11/8 with final duration TBD pending clinical course  - IgG ordered 11/17  - DSA ordered 11/15  - Donor risk labs ordered 11/15  - Repeat EGD timing per GI  - AC management per GI and ICU team, repeat BUE US to monitor for DVT     S/p BSLT for ILD:  Acute hypoxic respiratory failure s/p trach:   Pulmonary edema:  Bilateral pleural effusions: Unfortunately had not received vaccination for flu, PNA, or COVID-19 PTA.  Explant pathology with NSIP, no malignancy.  PGD 2-3.  Weaned off paralytic 10/19 (for vent dyssynchrony) and Caroline 10/22.  Initial difficulty weaning sedation given agitation then with neurological findings as below.  CXR initially post-op with pulmonary edema, aggressively diuresed.  Recurring fevers post-op, see ID section below.  Most recent bronch 10/30 with thick secretions in the RUL and LLL and mild mucosal erythema, bilateral anastomoses intact.  Remains intubated with persistent encephalopathy as below, s/p trach and PEG/J tube placement with thoracic surgery 10/29.  Had been intermittent tolerating PS trials.  Code called 11/2 for bradycardia/asystole (required 1 round of CPR, no medications) then progressively hypotensive, GI bleed as below.  Chest CT 11/2 with increased bilateral pleural effusions (moderate left, small right), bibasilar atelectasis with area of hypoenhancing parenchyma in LLL (suspicious for infection), and numerous nondisplaced anterior rib fractures bilaterally.  S/p left chest tube placement in IR 11/3, right deferred given very little effusion on US.  - Nebs: levalbuterol and Mucomyst QID  - Aggressive pulmonary toilet with chest physiotherapy QID  - Ventilator management per ICU team, PS trials as tolerated  - Volume management per nephrology and ICU team  - CXR daily with left chest tube in place, today with improved opacities (personally reviewed with Dr. Sifuentes), awaiting final read   - Defer bronch today, revisit  prn     Immunosuppression: s/p induction therapy with basiliximab 10/16 (and high dose IV steroid) and 10/20  - Tacrolimus SL 0.5 mg qAM / 1 mg qPM (11/2, changed to SL given GI bleed).  Goal level 8-12.  Level 11/4 therapeutic at 10 (10h level), no dose adjustment, daily levels (ordered).  - MMF 1000 mg BID (11/2, decreased given GI bleed, AZA to be avoided given TPMT)  - Prednisolone 15 mg qAM / 12.5 mg qPM, next taper due 11/7 (ordered)  Date AM dose (mg) PM dose (mg)   10/31/21 15 12.5   11/7/21 12.5 12.5   11/21/21 12.5 10   12/5/21 10 10   1/2/22 10 7.5   1/30/22 7.5 7.5   2/27/22 7.5 5   3/27/22 5 5   4/24/22 5 2.5      Prophylaxis:   - Holding Bactrim (11/2, due to Cr/potassium trend) for PJP ppx, revisit in 1-2 days  - Nystatin for oral candidiasis ppx, 6 month course  - See below for serologies and viral ppx:    Donor Recipient Intervention   CMV status Negative Negative None, CMV monthly (ordered 11/16)   EBV status Positive Positive None, EBV monthly (ordered 11/16)   HSV status N/A Positive Acyclovir POD #1-30   (recent infection history pre-txp)      ID: Concern for possible Strongyloides exposure pre-transplant s/p ivermectin x1 dose (9/17).  Donor and recipient cultures NGTD.  S/p IV ceftazidime/vancomycin for 48h per protocol and additional empiric ceftazidime 10/19-10/23 given recurrent fevers.  Bronch cultures 10/17 with Staph epi.  Cryptococcal Ag negative 10/23.    - Monthly Coccidioides Ag x12 months post-transplant per ID (urine and serum negative 10/17), due 11/17 (ordered)     Recurring fevers:  Fevers post-op, Tmax 101.7 POD #1.  Febrile with worsening leukocytosis again 10/25, generally persisting.  Trach sputum culture (10/25) with GPC, normal jean marie.  LP (10/29), xanthochromic with pleocytosis thought to be appropriate given RBC and WBCs, no ABX recommended per transplant ID and neurology.  Bronch culture (10/30) with GPC in clusters, normal jean marie.  S/p empiric meropenem 10/28-11/2.  -  Trach sputum culture (11/2) with GPC, follow     Disseminated Candida: Noted on blood cultures 10/20 and 10/22.  BDG fungitell positive (399) on 10/20.  Respiratory cultures with persistent Candida albicans.  TC 10/23 without evidence of endocarditis.  Ophthalmology consult 10/24 with benign dilated fundoscopic exam.  Candida empyema also noted 10/25, chest tubes inadvertently removed by CVTS 10/28, left chest tube replaced by IR 11/3 as above.  - Repeat blood cultures (10/23, 10/25, 11/2) NGTD   - Pleural cultures including cytology (11/3) pending  - Fluconazole (10/26, prior micafungin 10/22-10/27), at least through 11/8 but final duration TBD pending clinical course     HSV: Chronic intermittent active infection pre-transplant with recent HSV infection: crusted lesions throughout left side of jaw, s/p 10 day treatment course of ACV through 10/9.  HSV PCR blood negative 10/17.  - ACV ppx as above (started POD #1 instead of POD #8 given HSV history and location)     Hypogammaglobulinemia: IgG previously low at 364 (9/7).  Noted at 265 at time of transplant, s/p IVIG with premedication 10/21.    - Repeat IgG 11/17 (ordered)     Positive cross match: Note that he received two doses of rituximab in June, which is likely contributing to cross match result.  DSA most recently negative 11/1.  - DSA monitoring q2 weeks (11/15, ordered)     PHS risk criteria donor:  Additional labs required post-transplant (between 4-8 weeks post-op): Hepatitis B, Hepatitis C, and HIV by VISHAL (ordered 11/15).     SALTY: Noted pre-transplant.  Home CPAP 6-12 cm H2O.  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  MRI (10/23) with  multifocal subacute infarcts within both cerebral hemispheres and left cerebellum.  EEG without seizures 10/22, ammonia normal.  DDx include surgery v embolic v infectious (see above regarding ID work up).  Heparin drip started 10/23.   Repeat stroke code 10/25 with marked decrease in responsiveness with sedation wean.  Pupils not equal (3 mm R, 2 mm L with extropia) and gag reflex initially absent.  CTA head without obvious new pathology, MRI brain (with and without contrast) primarily revealing for infarct, low likelihood of PRES.  VEEG per neuro with severe diffuse encephalopathy.  Etiology of CVA likely 2/2 afib, PFO, or perioperative.  Some improvement noted since 10/29, repeat head CT 11/2 (following code) without new acute intracranial abnormalities.  - Heparin management per neurology, GI, and primary team, held since 11/2 due to GI bleed     Afib with RVR: Noted 10/18, started on amiodarone drip and transitioned to PO 10/21, dose decreased 10/29 (anticipate 4 week course).  Currently in SR.  Heparin drip as above.  Metoprolol resumed 11/3.     BRENNAN, Improving:   Hyperkalemia: Baseline creatinine 0.7.  BRENNAN noted POD #1, UO also downtrending.  Improved with aggressive diuresis, Cr worsened again (1.38 on 11/2) along with up trending BUN and hyperkalemia.  Nephrology consulted 11/1, thought to be prerenal/component of ATN.  - Holding Bactrim as above  - Management per nephrology and primary team     Recurrent GI bleed: Hemoglobin dropped to 6.6 10/22, s/p 2 units pRBC.  OG tube with bloody output, EGD 10/23 noted NJ/OG tube trauma with scant oozing.  IV PPI BID.  Progressive hypotensive 11/2, hemoglobin 5.8 with bloody G tube output.  Heparin drip held, transitioned to PPI drip, and MTP activated.  EGD 11/2 with large amount of clotted blood in stomach and area of raised mucosa with small adherent clot near PEG tube site that was clipped, no active bleeding.  Abdominal CT 11/2 with stomach distended with  "hyperdense fluid suggestive of blood products and dilated small bowel.  Maroon stools 11/3, repeat EGD with extensive old blood in stomach, no active bleeding, small nodular area with prior clips clipped again.  - IST adjusted to IV formulation as above  - Management per GI and primary team, repeat EGD timing per GI     Elevated LFTs: Shock liver post-op.  Initial ALT//230 evening of surgery, then with marked increase to 1550/1246 POD #1.  Had improved although now with acute rise again likely in setting of shock, again improving.    We appreciate the excellent care provided by the CVICU and CVTS teams.  Recommendations communicated via in person rounding and this note.  Will continue to follow along closely, please do not hesitate to call with any questions or concerns.    Patient seen and discussed with Dr. Sifuentes.    Keyla Rice PA-C  Inpatient [facility]INCESS  Pulmonary CF/Transplant     Subjective & Interval History:     Underwent repeat EGD with GI yesterday, no active bleeding and prior area clipped again.  Stools remain dark, hemoglobin stable.  Trickle TF resumed.  Also underwent left chest tube placement with IR for pleural effusion, 120 mL dark red fluid out.  Remains intubated, CMV 12/400/8/40.  Received chest physiotherapy QID.  Net negative with diuresis yesterday.  Intermittently following commands per nursing.  Tmax 99.7, WBC down trending.    Review of Systems:     ROS as above otherwise significantly limited due to trach and encephalopathy    Physical Exam:     Vital signs:  Temp: 98.6  F (37  C) Temp src: Bladder BP: 101/59 Pulse: 83   Resp: 25 SpO2: 98 % O2 Device: Mechanical Ventilator Oxygen Delivery: 15 LPM Height: 162.6 cm (5' 4\") Weight: 71.3 kg (157 lb 3 oz)  I/O:     Intake/Output Summary (Last 24 hours) at 11/4/2021 1037  Last data filed at 11/4/2021 1000  Gross per 24 hour   Intake 1544 ml   Output 3985 ml   Net -2441 ml     Constitutional: Lying in bed, in no apparent distress.   HEENT: " Eyes with pink conjunctivae, anicteric.  Oral mucosa moist without obvious lesions (limited visualization given patient participation).  Trach cdi.  PULM: Mildly diminished air flow to bases bilaterally.  No crackles, no rhonchi, no wheezes.  Non-labored breathing on full vent support.  Left chest tube without air leak.  CV: Normal S1 and S2.  RRR.  No murmur, gallop, or rub.  No peripheral edema.   ABD: NABS, soft, minimally distended.    MSK: No apparent muscle wasting.   NEURO: Opens eyes to voice, not following commands.  SKIN: Warm, dry, fragile.  No rash on limited exam.   PSYCH: Calm.     Lines, Drains, and Devices:  Peripheral IV 10/25/21 Right;Anterior;Lateral Lower forearm (Active)   Site Assessment WDL 11/04/21 0800   Line Status Infusing;Checked every 1 hour 11/04/21 0800   Dressing Intervention Dressing reinforced 11/01/21 0400   Phlebitis Scale 0-->no symptoms 11/04/21 0800   Infiltration Scale 0 11/04/21 0800   Infiltration Site Treatment Method  None 11/02/21 1600   If infiltrated, was a vesicant infusing? No 10/30/21 0400   Number of days: 10       Peripheral IV 10/25/21 Anterior;Distal;Right Upper arm (Active)   Site Assessment WDL 11/04/21 0800   Line Status Infusing;Checked every 1 hour 11/04/21 0800   Dressing Intervention Dressing reinforced 11/01/21 0400   Phlebitis Scale 0-->no symptoms 11/04/21 0800   Infiltration Scale 0 11/04/21 0800   Infiltration Site Treatment Method  None 11/02/21 0400   If infiltrated, was a vesicant infusing? No 10/30/21 0400   Number of days: 10       Peripheral IV 11/02/21 Anterior;Distal;Right Lower forearm (Active)   Site Assessment WDL 11/04/21 0800   Line Status Infusing;Checked every 1 hour 11/04/21 0800   Phlebitis Scale 0-->no symptoms 11/04/21 0800   Infiltration Scale 0 11/04/21 0800   Infiltration Site Treatment Method  None 11/04/21 0400   Number of days: 2       Peripheral IV 11/02/21 Anterior;Right Upper forearm (Active)   Site Assessment WDL 11/04/21  0800   Line Status Infusing;Checked every 1 hour 11/04/21 0800   Phlebitis Scale 0-->no symptoms 11/04/21 0800   Infiltration Scale 0 11/04/21 0800   Infiltration Site Treatment Method  None 11/02/21 1600   Number of days: 2       Arterial Line 11/02/21 Radial (Active)   Site Assessment WDL 11/04/21 0800   Line Status Pulsatile blood flow 11/04/21 0800   Arterine Line Cap Change Due 11/05/21 11/04/21 0800   Art Line Waveform Appropriate 11/04/21 0800   Art Line Interventions Connections checked and tightened 11/04/21 0800   Color/Movement/Sensation Capillary refill less than 3 sec 11/04/21 0800   Line Necessity Yes, meets criteria 11/04/21 0800   Dressing Type Transparent 11/04/21 0800   Dressing Status Clean, dry, intact 11/04/21 0800   Dressing Intervention Dressing changed/new dressing 11/02/21 1700   Dressing Change Due 11/07/21 11/04/21 0800   Number of days: 2       CVC Double Lumen Right Femoral (Active)   Site Assessment WDL 11/04/21 0800   Dressing Type Chlorhexidine sponge;Transparent 11/04/21 0800   Dressing Status clean;dry;intact 11/04/21 0800   Dressing Intervention new dressing 11/02/21 1200   Dressing Change Due 11/07/21 11/04/21 0800   Line Necessity yes, meets criteria 11/04/21 0800   Brown - Status saline locked 11/04/21 0800   Brown - Cap Change Due 11/05/21 11/04/21 0800   White - Status saline locked 11/04/21 0800   White - Cap Change Due 11/05/21 11/04/21 0800   Phlebitis Scale 0-->no symptoms 11/04/21 0800   Infiltration? no 11/04/21 0800   Infiltration Scale 0 11/04/21 0800   CVC Comment CDI 11/04/21 0800   Number of days: 2     Data:     LABS    CMP:   Recent Labs   Lab 11/04/21  1019 11/04/21  0351 11/04/21  0339 11/03/21  2210 11/03/21  2204 11/03/21  1656 11/03/21  1652 11/03/21  1023 11/03/21  1023 11/03/21  0411 11/03/21  0407 11/02/21  1214 11/02/21  1211 11/02/21  0847 11/02/21  0832 11/02/21  0826 11/02/21  0815 11/02/21  0609 11/02/21  0556   NA  --   --  148*  148*  --  150*  --   149*  --  150*   < > 148*   < > 153*  153*   < > 144   < > 153*   < > 146*   POTASSIUM  --   --  4.0  4.0  --  4.1  --  4.4  --  4.4   < > 5.3   < > 4.6  4.6   < > 6.6*   < > 4.7   < > 5.1   CHLORIDE  --   --  121*  121*  --  119*  --  116*  --  114*   < > 116*   < > 117*  117*   < > 112*   < > 119*   < > 113*   CO2  --   --  23  23  --  24  --  27  --  29   < > 27   < > 32  32   < > 18*   < > 22   < > 26   ANIONGAP  --   --  4  4  --  7  --  6  --  7   < > 5   < > 4  4   < > 14   < > 12   < > 7   * 139* 138*  138* 147* 160*   < > 211*   < > 159*   < > 138*   < > 56*  56*   < > 300*   < > 240*  225*   < > 140*   BUN  --   --  86*  86*  --  91*  --  104*  --  111*   < > 109*   < > 95*  95*   < > 98*   < > 82*   < > 101*   CR  --   --  0.88  0.88  --  0.97  --  1.11  --  1.24   < > 1.27*   < > 1.30*  1.30*   < > 1.47*   < > 1.17   < > 1.09   GFRESTIMATED  --   --  >90  >90  --  87  --  74  --  65   < > 63   < > 61  61   < > 53*   < > 69   < > 75   ERIK  --   --  8.7  8.7  --  9.0  --  9.1  --  9.1   < > 9.4   < > 10.0  10.0   < > 8.8   < > 7.2*   < > 9.3   MAG  --   --  2.0  --   --   --   --   --   --   --  2.1  --   --   --   --   --  1.9  --  2.2   PHOS  --   --  3.7  --   --   --   --   --   --   --  4.4  --   --   --   --   --  7.0*  --  3.1   PROTTOTAL  --   --  4.8*  --   --   --   --   --   --   --  4.8*  --  4.5*  --  4.4*   < > 3.8*   < > 5.3*   ALBUMIN  --   --  1.7*  --   --   --   --   --   --   --  1.8*  --  1.5*  --  1.4*   < > 1.1*   < > 1.6*   BILITOTAL  --   --  0.4  --   --   --   --   --   --   --  0.5  --  0.6  --  0.3   < > 0.2   < > 0.2   ALKPHOS  --   --  129  --   --   --   --   --   --   --  146  --  182*  --  139   < > 110   < > 150   AST  --   --  117*  --   --   --   --   --   --   --  297*  --  450*  --  68*   < > 33   < > 39   ALT  --   --  262*  --   --   --   --   --   --   --  356*  --  392*  --  72*   < > 50   < > 72*    < > = values in this interval not  displayed.     CBC:   Recent Labs   Lab 11/04/21  0339 11/03/21  1652 11/03/21  1023 11/03/21  0407 11/02/21  2206 11/02/21 2206   WBC 25.1* 26.9*  --  35.1*  --  36.4*   RBC 3.04* 3.04*  --  3.34*  --  3.32*   HGB 9.1* 9.2* 9.6* 10.1*   < > 10.1*   HCT 29.0* 28.1*  --  30.3*  --  30.1*   MCV 95 92  --  91  --  91   MCH 29.9 30.3  --  30.2  --  30.4   MCHC 31.4* 32.7  --  33.3  --  33.6   RDW 19.2* 19.6*  --  19.6*  --  18.7*    156  --  159  --  151    < > = values in this interval not displayed.       INR:   Recent Labs   Lab 11/04/21  0339 11/03/21  0407 11/02/21  1053 11/02/21  0908   INR 1.32* 1.36* 1.55* 1.56*       Glucose:   Recent Labs   Lab 11/04/21  1019 11/04/21  0351 11/04/21  0339 11/03/21  2210 11/03/21  2204 11/03/21  1656   * 139* 138*  138* 147* 160* 192*       Blood Gas:   Recent Labs   Lab 11/04/21  0550 11/03/21  2204 11/03/21  1652 11/02/21  0826 11/02/21  0815   PHV  --   --   --   --  7.23*   PCO2V  --   --   --   --  53*   PO2V  --   --   --   --  25   HCO3V  --   --   --   --  22   LORI  --   --   --   --  -5.4   O2PER 40 50 70   < > 100    < > = values in this interval not displayed.       Culture Data No results for input(s): CULT in the last 168 hours.    Virology Data:   Lab Results   Component Value Date    FLUAH1 Negative 10/17/2021    FLUAH3 Negative 10/17/2021    VW1663 Negative 10/17/2021    IFLUB Negative 10/17/2021    RSVA Negative 10/17/2021    RSVB Negative 10/17/2021    PIV1 Negative 10/17/2021    PIV2 Negative 10/17/2021    PIV3 Negative 10/17/2021    HMPV Negative 10/17/2021    HRVS Negative 10/17/2021    ADVBE Negative 10/17/2021    ADVC Negative 10/17/2021       Historical CMV results (last 3 of prior testing):  Lab Results   Component Value Date    CMVQNT Not Detected 10/26/2021     No results found for: CMVLOG    Urine Studies    Recent Labs   Lab Test 11/01/21  1336 10/25/21  1507   URINEPH 5.5 5.5   NITRITE Negative Negative   LEUKEST Negative Negative    WBCU 0 2       Most Recent [place] Pulmonary Function Testing (FVC/FEV1 only)  No results found for:   No results found for:   No results found for:   No results found for:     IMAGING    Recent Results (from the past 48 hour(s))   Echo Limited    Narrative    [id number]  [id number]  [id number]  [id number]^BERTHA^EFREN     [facility],[place]  Echocardiography Laboratory  [address] [place] [address]     Name: SHAYNA GUERRIER  MRN: [id number]  : 1965  Study Date: 2021 11:10 AM  Age: 56 yrs  Gender: Male  Patient Location: On license of [facility]  Reason For Study: Shock  Ordering Physician: EFREN STONE  Referring Physician: SAUNDRA GILL  Performed By: Pamela Díaz RDCS     BSA: 1.7 m2  Height: 64 in  Weight: 152 lb  HR: 110  BP: 94/63 mmHg  ______________________________________________________________________________  Procedure  Limited Echocardiogram with portions of two-dimensional, color and spectral  Doppler performed.  ______________________________________________________________________________  Interpretation Summary  Small biventricular size with hyperdynamic biventricular function.  The inferior vena cava was normal in size with preserved respiratory  variability.  No pericardial effusion is present.  ______________________________________________________________________________  Left Ventricle  Small left ventricular size with a hyperdynamic function.     Right Ventricle  Small right ventricular size with a hyperdynamic function.     Vessels  The inferior vena cava was normal in size with preserved respiratory  variability.     Pericardium  No pericardial effusion is present.     Miscellaneous  A left pleural effusion is present.  ______________________________________________________________________________  Report approved by: Jazmin Alonzo 2021 12:30 PM     ______________________________________________________________________________       CT Head w/o Contrast    Narrative    CT HEAD W/O CONTRAST 11/2/2021 1:43 PM    History: Anoxic brain damage     Comparison: MR brain 10/26/2021 and CT head 10/25/2021    Technique: Using multidetector thin collimation helical acquisition  technique, axial, coronal and sagittal CT images from the skull base  to the vertex were obtained without intravenous contrast.   (topogram) image(s) also obtained and reviewed.    Findings:   Redemonstration of hypodense subacute infarcts of the left occipital  lobe, right occipital lobe, and bilateral parietal lobes. Scattered  gyral hyperdensity corresponding to the effected regions, consistent  with cortical laminar necrosis. No new area of infarct. No  superimposed parenchymal hemorrhage. Mild generalized cerebral volume  loss. No mass effect or midline shift. Ventricles are  proportionate/cerebral sulci. Basal cisterns are clear.     The bony calvaria and the bones of the skull base are normal.  Bilateral mastoid air cell effusions, likely related to intubation.  Visualized portions of the paranasal sinuses are clear. Orbits  unremarkable. Partially visualized emphysema in the parapharyngeal  spaces, likely related to recent tracheostomy.         Impression    Impression: No new acute intracranial abnormalities. Scattered  subacute multifocal infarctions in both cerebral hemispheres, in  similar distribution to prior MRI. No CT evidence for new focal  cortical hypoattenuations to suggest acute infarction or new areas of  anoxic brain damage. No intracranial hemorrhage.    I have personally reviewed the examination and initial interpretation  and I agree with the findings.    SUZI HOLDEN MD         SYSTEM ID:  [id number]   CT Chest/Abdomen/Pelvis w Contrast    Narrative    EXAMINATION: CT CHEST/ABDOMEN/PELVIS W CONTRAST 11/2/2021 1:46 PM    TECHNIQUE: Helical CT images from the thoracic inlet through the  symphysis pubis were obtained with intravenous contrast.  Contrast  dose: Iopamidol (ISOVUE-370) solution 100 mL    COMPARISON: CT 10/25/2021    HISTORY: GI bleed    FINDINGS:    Chest:  Heart/ Mediastinum: Heart is within normal limits. Atherosclerotic  calcifications of the aorta and coronary arteries. Small pericardial  effusion. No bulky lymphadenopathy. Esophagus appears normal. Foci of  gas in the lower neck likely from tracheostomy placement. Small  filling defect in the lower right internal jugular vein (series 21,  image 8), decreased in size since 10/25/2021.    Lungs/pleura: The tracheostomy tube tip is approximately 5 cm above  the shiv. There is fluid density filling in the superior trachea  above the tracheostomy tube. Postsurgical changes of bilateral lung  transplant. There is increased moderate left pleural effusion and  increased small right pleural effusion. Bibasilar opacities, which are  predominantly homogeneously enhancing although there is a region  within the left lower lobe which is hypoenhancing. No pneumothorax.    Chest wall/axilla: Sternotomy wires. No lymphadenopathy.    Abdomen and Pelvis:    Liver: Unremarkable. No suspicious masses. No hepatic steatosis.     Gallbladder/biliary tree: No gallstones. No biliary dilatation.    Spleen: Unremarkable.    Pancreas: Unremarkable. No mass or ductal dilatation.    Adrenal glands: Unremarkable.    Kidneys: Unremarkable. No hydronephrosis.    Bowel: Percutaneous gastrojejunostomy tube has been placed with tip in  proximal jejunum. Heterogeneously hyperdense fluid within the  distended stomach. No definite extravasation identified on  postcontrast images. Metallic clips in the stomach and duodenum,  within a small periampullary duodenal diverticulum. Sigmoid  diverticulosis without evidence of acute diverticulitis. Small bowel  is dilated up to 3 cm without definite transition point. Rectal tube  is in place.    Retroperitoneum: Vasculature is grossly unremarkable. No  bulky  lymphadenopathy.    Pelvis: Watson catheter is present within the urinary bladder. There is  a small amount of air within the urinary bladder, as well as a single  punctate focus of air within the mid right kidney. Prostate and  seminal vesicles are within normal limits. Right femoral venous  catheter with the tip positioned in the right external iliac vein.    Bones: Unremarkable. No suspicious lesions.    Soft Tissues: Small ill-defined fluid collections deep to the  pectoralis major muscles bilaterally (series 21, image 123),  increased/new compared to the previous CT. This measures up to 5.8 x  2.4 cm on the right. Numerous nondisplaced anterior rib fractures  bilaterally.        Impression    IMPRESSION:  1.  The stomach is distended with hyperdense fluid suggestive of blood  products. No contrast extravasation in the gastrointestinal tract to  indicate ongoing hemorrhage.   2.  Increased bilateral pleural effusions compared to CT 10/25/2021,  moderate on the left, small on the right. Bibasilar atelectasis with  an area of hypoenhancing parenchyma in the left lower lobe, suspicious  for infection.  3.  Dilated small bowel without definite transition point suggestive  of ileus.  4.  Decreased nonocclusive thrombus in the right internal jugular  vein.  5.  Numerous nondisplaced anterior rib fractures bilaterally. Small  fluid collections deep to the pectoralis major muscles bilaterally,  increased/new compared to the previous CT and likely representing  small seromas/hematomas.       I have personally reviewed the examination and initial interpretation  and I agree with the findings.    ROSS MAK MD         SYSTEM ID:  [id number]   XR Chest Port 1 View    Narrative    Exam: XR CHEST PORT 1 VIEW, 11/3/2021 1:05 AM    Indication: s/p lung transplant    Comparison: 11/2/2021    Findings:   Tracheostomy distal tip projects over the high thoracic trachea.  Surgical changes of lung transplant with clamshell  sternotomy wires.  Cardiomediastinal silhouette is prominent similar to prior. Small  bilateral pleural effusions similar to prior. Slightly increased hazy  bibasilar opacities. No pneumothorax. No acute osseous abnormality is.      Impression    Impression: Small bilateral pleural effusions with slightly increased  hazy bibasilar airspace opacities.    I have personally reviewed the examination and initial interpretation  and I agree with the findings.    FITO PERALES MD         SYSTEM ID:  [id number]   IR Chest Tube Place Non Tunneled Left    Narrative    Edson BACA Norberto   [phone]     Completed ultrasound guided bedside placement of LEFT-sided chest  tube. ?A 14 Upper sor[place] non-locking drain placed and connected to a  closed-system drainage container under water seal. ?A sample of 120 mL  dark red colored fluid was collected. ?No immediate complication. ?Dx:  ?Pleural effusion. ?Mariam.     Evaluation for ultrasound guided placement of RIGHT-sided chest tube.  ?Bedside ultrasound shows very little effusion. ?No attempt at chest  tube placement made. ?Dx: ?Pleural effusion. ?Mariam.

## 2021-11-04 NOTE — PROGRESS NOTES
CLINICAL NUTRITION SERVICES - BRIEF NOTE   (see RD note on 11/2 for full assessment)    Started trophic feeds overnight via Jtube. Per team, okay to adv to goal    Nutrition changes today:  Adv by 15 ml q8h to eventual goal, Novasource @ 45 ml/hr     Sona Amezquita RD, LD  [id number]  Pgr: [id number]

## 2021-11-04 NOTE — PROGRESS NOTES
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    OVERNIGHT EVENTS:  NAEO    TODAY'S PROGRESS:   Remove mac line  Ask gi about subcutaneous heparin  Upper extremity duplex for rij thrombus  Lasix 40  Advance tube feeds    PLAN:  Neuro/ pain/ sedation:  Acute postoperative pain  Anxiety and depression  Acute to subacute CVA, b/l cerebral hemispheres and L cerebellum, suspect embolic  Encephalopathy and diffuse weakness - improving slightly   R ear lateral canal floor excoriation with bleeding - improving    - Pain and sedation: none  - make APAP prn in setting of elevated LFTs, lido patch   - appreciate ENT  - appreciate neuro    Pulmonary care:   SALTY on home CPAP  Acute hypoxic respiratory failure s/p b/l lung transplant 10/16/21  S/p tracheostomy 10/29  Empyema with Candida s/p chest tube 11/3/21  - mechanically ventilated via trach  - Levalbuterol nebs and Mucomyst, chest PT  - Daily CXR  - appreciate Pulmonary  - lasix 40    Cardiovascular:    HTN  Afib   PFO  Vasoplegic shock in setting of hypovolemia - resolved  - Monitor hemodynamic status.   - MAP goal <100, SBP <140  - metoprolol 25 BID    - prn labetalol  - hold statin 2/2 elevated LFTs  - Amiodarone 200 mg daily   - low-intensity heparin gtt - hold in setting of gi bleed    GI care/ Nutrition:   Elevated LFTs - recurrent  GI bleed 2/2 NG trauma, now recurrent causing code in setting of recent GJ tube placement  Moderate malnutrition in the context of acute on chronic illness  - Diet: advance tube feeds  - PPI gtt  - appreciate GI   - Rescope tomorrow or day after    Renal/ Fluid Balance/  Electrolytes:   Acute kidney injury, recurrent, now with azotemia   Hypernatremia, improved with FWF  Hyperkalemia, improving   BL creat appears to be ~ 0.7  - free water flushes 75 q4    - Strict I/O, daily weights  - Avoid/limit nephrotoxins as able  - Replete lytes PRN per protocol     Endocrine:    Stress induced hyperglycemia with steroid-induced component, on DM2  Hypothyroidism  RA  Preop A1c 6.6  - insulin gtt  - Goal BG <180 for optimal healing  - continue home synthroid     ID/ Antibiotics:  Immunosuppression s/p transplant  Candidal infection of donor lungs (likely source), pleural fluid, and fungemia   - NGTD CSF. Last + blood cx 10/22. No vegetations on valves per TC 10/23  - continue fluconazole pending clinical course  - completed empiric meropenem x5d   - Nystatin, Acyclovir, bactrim   - Tacrolimus (via g tube), prednisone    - appreciate transplant ID, ophthalmology   - Continue to monitor fever curve, WBC and inflammatory markers as appropriate   -Blood cultures pending - so far negative    Heme:     Acute blood loss anemia secondary to surgery and GI bleed, and anemia related to critical illness and iron deficiency    Hypogammaglobulinemia s/p IVIG  Thrombocytopenia, HIT negative - resolved   Lactic acidosis, resolved   - iron studies consistent with iron deficiency, will not treat for now given infection  - hgb q12    MSK/ Skin:  Clamshell surgical incision  - Sternal precautions  - Postoperative incision management per protocol  - PT/OT/CR     Prophylaxis:    - Mechanical prophylaxis for DVT: SCDs  - Chemical DVT prophylaxis: held 2/2 GI bleed   - PPI for 30 days postoperatively     Lines/ tubes/ drains:  - trach  - peg-j  - Watson 10/25    - PIVs  - left radial a line  - right femoral MAC line    Disposition:  - CVICU     Patient seen, findings and plan discussed with CVICU staff.     Moises Ribera  PGY-3 Anesthesiology      ====================================    SUBJECTIVE:    rylan    OBJECTIVE:   1. VITAL SIGNS:   Temp:  [97.5  F (36.4  C)-99.7  F (37.6  C)] 99.1  F (37.3  C)  Pulse:  [76-98] 92  Resp:  [14-27] 16  BP: (101)/(59) 101/59  MAP:  [64 mmHg-118 mmHg] 86 mmHg  Arterial Line BP: (105-172)/(44-84) 132/63  FiO2 (%):  [50 %-70 %] 50 %  SpO2:  [95 %-100 %] 97 %  Ventilation Mode: CMV/AC  (Continuous Mandatory Ventilation/ Assist Control)  FiO2 (%): 50 %  Rate Set (breaths/minute): 12 breaths/min  Tidal Volume Set (mL): 400 mL  PEEP (cm H2O): 8 cmH2O  Oxygen Concentration (%): 50 %  Resp: 16    2. INTAKE/ OUTPUT:   I/O last 3 completed shifts:  In: 1427 [P.O.:300; I.V.:622; NG/GT:340]  Out: 3925 [Urine:3100; Emesis/NG output:145; Stool:100; Chest Tube:580]    3. PHYSICAL EXAMINATION:   General: NAD  Neuro: opens eyes to voice, moving head. Wiggles toes BLE to command, no movement BUE to noxious, grimacing only to noxious of L hand   Resp: vented via trach, nonlabored   CV: RRR on tele   Abdomen: soft, nontender, rounded   Incisions: c/d/i  Extremities: warm and well perfused, no edema    4. INVESTIGATIONS:   Arterial Blood Gases   Recent Labs   Lab 11/04/21  0550 11/03/21 2204 11/03/21 1652 11/03/21  1024   PH 7.47* 7.46* 7.46* 7.49*   PCO2 34* 36 39 39   PO2 100 108* 141* 75*   HCO3 25 26 27 30*     Complete Blood Count   Recent Labs   Lab 11/04/21  0339 11/03/21  1652 11/03/21  1023 11/03/21  0407 11/02/21 2206 11/02/21 2206   WBC 25.1* 26.9*  --  35.1*  --  36.4*   HGB 9.1* 9.2* 9.6* 10.1*   < > 10.1*    156  --  159  --  151    < > = values in this interval not displayed.     Basic Metabolic Panel  Recent Labs   Lab 11/04/21  0351 11/04/21  0339 11/03/21  2210 11/03/21  2204 11/03/21  1656 11/03/21  1652 11/03/21  1023 11/03/21  1023   NA  --  148*  148*  --  150*  --  149*  --  150*   POTASSIUM  --  4.0  4.0  --  4.1  --  4.4  --  4.4   CHLORIDE  --  121*  121*  --  119*  --  116*  --  114*   CO2  --  23  23  --  24  --  27  --  29   BUN  --  86*  86*  --  91*   --  104*  --  111*   CR  --  0.88  0.88  --  0.97  --  1.11  --  1.24   * 138*  138* 147* 160*   < > 211*   < > 159*    < > = values in this interval not displayed.     Liver Function Tests  Recent Labs   Lab 11/04/21  0339 11/03/21  0407 11/02/21  1211 11/02/21  1053 11/02/21  0908 11/02/21  0832 11/02/21  0832   * 297* 450*  --   --   --  68*   * 356* 392*  --   --   --  72*   ALKPHOS 129 146 182*  --   --   --  139   BILITOTAL 0.4 0.5 0.6  --   --   --  0.3   ALBUMIN 1.7* 1.8* 1.5*  --   --   --  1.4*   INR 1.32* 1.36*  --  1.55* 1.56*   < > 1.38*    < > = values in this interval not displayed.     Pancreatic Enzymes  No lab results found in last 7 days.  Coagulation Profile  Recent Labs   Lab 11/04/21 0339 11/03/21 0407 11/02/21  1053 11/02/21  0927 11/02/21  0908 11/02/21  0832 11/02/21  0832   INR 1.32* 1.36* 1.55*  --  1.56*   < > 1.38*   PTT  --   --  34 32  --   --  50*    < > = values in this interval not displayed.        =========================================

## 2021-11-04 NOTE — PROCEDURES
[facility]    Triple Lumen PICC Placement    Date/Time: 11/4/2021 5:42 PM  Performed by: Lois Flores RN  Authorized by: Altagracia Allan MD   Indications: vascular access    UNIVERSAL PROTOCOL   Site Marked: Yes  Prior Images Obtained and Reviewed:  Yes  Required items: Required blood products, implants, devices and special equipment available    Patient identity confirmed:  Verbally with patient  NA - No sedation, light sedation, or local anesthesia  Confirmation Checklist:  Patient's identity using two indicators, relevant allergies, procedure was appropriate and matched the consent or emergent situation and correct equipment/implants were available  Time out: Immediately prior to the procedure a time out was called    Universal Protocol: the Joint Commission Universal Protocol was followed    Preparation: Patient was prepped and draped in usual sterile fashion           ANESTHESIA    Anesthesia: See MAR for details  Local Anesthetic:  Lidocaine 1% without epinephrine  Anesthetic Total (mL):  3      SEDATION    Patient Sedated: No        Preparation: skin prepped with ChloraPrep  Skin prep agent: skin prep agent completely dried prior to procedure  Sterile barriers: maximum sterile barriers were used: cap, mask, sterile gown, sterile gloves, and large sterile sheet  Hand hygiene: hand hygiene performed prior to central venous catheter insertion  Type of line used: Power PICC  Catheter type: triple lumen  Lumen type: non-valved  Catheter size: 5 Fr  Brand: Bard  Lot number: [id number]  Placement method: venipuncture, MST, ultrasound and tip confirmation system  Number of attempts: 1  Successful placement: yes  Orientation: left  Location: basilic vein (vein diameter- 0.67cm)  Site rationale: right non occlusive thrombus subclavian vein   Arm circumference: adults 10 cm  Extremity circumference: 28  Visible catheter length: 1  Total catheter length: 44  Dressing  and securement: blood cleaned with CHG, chlorhexidine disc applied, site cleaned, statlock and sterile dressing applied  Post procedure assessment: blood return through all ports and free fluid flow (Bard 3CG)  PROCEDURE   Patient Tolerance:  Patient tolerated the procedure well with no immediate complications  Describe Procedure: PICC placement verified by Bard 3CG. Tip at satisfactory position. PICC okay to use.

## 2021-11-04 NOTE — PLAN OF CARE
Major Shift Events:  Neuros remain unchanged. HR in NSR-ST, MAPs stable. PS for 4 hours, switched to vent for tachypnea. Lasix x1 good UO. PICC line placed today. TF titrated to goal via J tube, however noted TF backing up into G tube/gravity bag. MD notified and TF held for possible ileus. Hep gtt started @ 400 units/hr.   Plan: EGD tomorrow, continue to PS/trach dome  For vital signs and complete assessments, please see documentation flowsheets.

## 2021-11-04 NOTE — PLAN OF CARE
Major Shift Events: Neuro status unchanged, inconsistently follows commands. Remains in NSR, MAP maintained without intervention. PEEP changed to 8 per order, FiO2 titrated to 40%. TF started at 15ml/hr via J tube. Minimal serosanguinous output via g tube. Hgb remains stable.  Plan: Continue to monitor neuro status. Maintain hemodynamic status.  For vital signs and complete assessments, please see documentation flowsheets.

## 2021-11-05 ENCOUNTER — APPOINTMENT (OUTPATIENT)
Dept: CT IMAGING | Facility: CLINIC | Age: 56
End: 2021-11-05
Attending: STUDENT IN AN ORGANIZED HEALTH CARE EDUCATION/TRAINING PROGRAM
Payer: COMMERCIAL

## 2021-11-05 ENCOUNTER — APPOINTMENT (OUTPATIENT)
Dept: GENERAL RADIOLOGY | Facility: CLINIC | Age: 56
End: 2021-11-05
Attending: STUDENT IN AN ORGANIZED HEALTH CARE EDUCATION/TRAINING PROGRAM
Payer: COMMERCIAL

## 2021-11-05 LAB
ALBUMIN SERPL-MCNC: 1.8 G/DL (ref 3.4–5)
ALP SERPL-CCNC: 130 U/L (ref 40–150)
ALT SERPL W P-5'-P-CCNC: 226 U/L (ref 0–70)
ANION GAP SERPL CALCULATED.3IONS-SCNC: 4 MMOL/L (ref 3–14)
ANION GAP SERPL CALCULATED.3IONS-SCNC: 9 MMOL/L (ref 3–14)
AST SERPL W P-5'-P-CCNC: 59 U/L (ref 0–45)
BACTERIA PLR CULT: ABNORMAL
BASE EXCESS BLDA CALC-SCNC: 1.1 MMOL/L (ref -9–1.8)
BASE EXCESS BLDA CALC-SCNC: 1.4 MMOL/L (ref -9–1.8)
BILIRUB SERPL-MCNC: 0.5 MG/DL (ref 0.2–1.3)
BRONCHOSCOPY: NORMAL
BRONCHOSCOPY: NORMAL
BUN SERPL-MCNC: 44 MG/DL (ref 7–30)
BUN SERPL-MCNC: 60 MG/DL (ref 7–30)
CALCIUM SERPL-MCNC: 8.2 MG/DL (ref 8.5–10.1)
CALCIUM SERPL-MCNC: 8.2 MG/DL (ref 8.5–10.1)
CHLORIDE BLD-SCNC: 118 MMOL/L (ref 94–109)
CHLORIDE BLD-SCNC: 123 MMOL/L (ref 94–109)
CO2 SERPL-SCNC: 23 MMOL/L (ref 20–32)
CO2 SERPL-SCNC: 24 MMOL/L (ref 20–32)
CREAT SERPL-MCNC: 0.65 MG/DL (ref 0.66–1.25)
CREAT SERPL-MCNC: 0.71 MG/DL (ref 0.66–1.25)
ERYTHROCYTE [DISTWIDTH] IN BLOOD BY AUTOMATED COUNT: 18.3 % (ref 10–15)
ERYTHROCYTE [DISTWIDTH] IN BLOOD BY AUTOMATED COUNT: 18.6 % (ref 10–15)
GFR SERPL CREATININE-BSD FRML MDRD: >90 ML/MIN/1.73M2
GFR SERPL CREATININE-BSD FRML MDRD: >90 ML/MIN/1.73M2
GLUCOSE BLD-MCNC: 103 MG/DL (ref 70–99)
GLUCOSE BLD-MCNC: 130 MG/DL (ref 70–99)
GLUCOSE BLDC GLUCOMTR-MCNC: 111 MG/DL (ref 70–99)
GLUCOSE BLDC GLUCOMTR-MCNC: 117 MG/DL (ref 70–99)
GLUCOSE BLDC GLUCOMTR-MCNC: 118 MG/DL (ref 70–99)
GLUCOSE BLDC GLUCOMTR-MCNC: 119 MG/DL (ref 70–99)
GLUCOSE BLDC GLUCOMTR-MCNC: 122 MG/DL (ref 70–99)
GLUCOSE BLDC GLUCOMTR-MCNC: 138 MG/DL (ref 70–99)
HCO3 BLD-SCNC: 24 MMOL/L (ref 21–28)
HCO3 BLD-SCNC: 24 MMOL/L (ref 21–28)
HCT VFR BLD AUTO: 28.3 % (ref 40–53)
HCT VFR BLD AUTO: 28.4 % (ref 40–53)
HGB BLD-MCNC: 8.9 G/DL (ref 13.3–17.7)
HGB BLD-MCNC: 9 G/DL (ref 13.3–17.7)
INR PPP: 1.27 (ref 0.85–1.15)
MAGNESIUM SERPL-MCNC: 1.9 MG/DL (ref 1.6–2.3)
MCH RBC QN AUTO: 29.8 PG (ref 26.5–33)
MCH RBC QN AUTO: 30.5 PG (ref 26.5–33)
MCHC RBC AUTO-ENTMCNC: 31.4 G/DL (ref 31.5–36.5)
MCHC RBC AUTO-ENTMCNC: 31.7 G/DL (ref 31.5–36.5)
MCV RBC AUTO: 95 FL (ref 78–100)
MCV RBC AUTO: 96 FL (ref 78–100)
O2/TOTAL GAS SETTING VFR VENT: 40 %
O2/TOTAL GAS SETTING VFR VENT: 40 %
PATH REPORT.COMMENTS IMP SPEC: NORMAL
PATH REPORT.FINAL DX SPEC: NORMAL
PATH REPORT.GROSS SPEC: NORMAL
PATH REPORT.RELEVANT HX SPEC: NORMAL
PCO2 BLD: 31 MM HG (ref 35–45)
PCO2 BLD: 32 MM HG (ref 35–45)
PH BLD: 7.49 [PH] (ref 7.35–7.45)
PH BLD: 7.49 [PH] (ref 7.35–7.45)
PHOSPHATE SERPL-MCNC: 3 MG/DL (ref 2.5–4.5)
PLATELET # BLD AUTO: 161 10E3/UL (ref 150–450)
PLATELET # BLD AUTO: 161 10E3/UL (ref 150–450)
PO2 BLD: 126 MM HG (ref 80–105)
PO2 BLD: 331 MM HG (ref 80–105)
POTASSIUM BLD-SCNC: 3.7 MMOL/L (ref 3.4–5.3)
POTASSIUM BLD-SCNC: 4.1 MMOL/L (ref 3.4–5.3)
PROT SERPL-MCNC: 4.9 G/DL (ref 6.8–8.8)
RBC # BLD AUTO: 2.95 10E6/UL (ref 4.4–5.9)
RBC # BLD AUTO: 2.99 10E6/UL (ref 4.4–5.9)
SODIUM SERPL-SCNC: 150 MMOL/L (ref 133–144)
SODIUM SERPL-SCNC: 151 MMOL/L (ref 133–144)
TACROLIMUS BLD-MCNC: 5.5 UG/L (ref 5–15)
TME LAST DOSE: NORMAL H
TME LAST DOSE: NORMAL H
UFH PPP CHRO-ACNC: 0.34 IU/ML
UFH PPP CHRO-ACNC: <0.1 IU/ML
UPPER GI ENDOSCOPY: NORMAL
WBC # BLD AUTO: 17.5 10E3/UL (ref 4–11)
WBC # BLD AUTO: 18.5 10E3/UL (ref 4–11)

## 2021-11-05 PROCEDURE — 85027 COMPLETE CBC AUTOMATED: CPT

## 2021-11-05 PROCEDURE — 999N000015 HC STATISTIC ARTERIAL MONITORING DAILY

## 2021-11-05 PROCEDURE — 250N000013 HC RX MED GY IP 250 OP 250 PS 637: Performed by: INTERNAL MEDICINE

## 2021-11-05 PROCEDURE — 94668 MNPJ CHEST WALL SBSQ: CPT

## 2021-11-05 PROCEDURE — 250N000013 HC RX MED GY IP 250 OP 250 PS 637: Performed by: STUDENT IN AN ORGANIZED HEALTH CARE EDUCATION/TRAINING PROGRAM

## 2021-11-05 PROCEDURE — 71045 X-RAY EXAM CHEST 1 VIEW: CPT

## 2021-11-05 PROCEDURE — 250N000011 HC RX IP 250 OP 636: Performed by: STUDENT IN AN ORGANIZED HEALTH CARE EDUCATION/TRAINING PROGRAM

## 2021-11-05 PROCEDURE — 85610 PROTHROMBIN TIME: CPT | Performed by: STUDENT IN AN ORGANIZED HEALTH CARE EDUCATION/TRAINING PROGRAM

## 2021-11-05 PROCEDURE — 0WJP8ZZ INSPECTION OF GASTROINTESTINAL TRACT, VIA NATURAL OR ARTIFICIAL OPENING ENDOSCOPIC APPROACH: ICD-10-PCS | Performed by: INTERNAL MEDICINE

## 2021-11-05 PROCEDURE — 85520 HEPARIN ASSAY: CPT

## 2021-11-05 PROCEDURE — 85520 HEPARIN ASSAY: CPT | Performed by: STUDENT IN AN ORGANIZED HEALTH CARE EDUCATION/TRAINING PROGRAM

## 2021-11-05 PROCEDURE — 99291 CRITICAL CARE FIRST HOUR: CPT | Mod: 24 | Performed by: ANESTHESIOLOGY

## 2021-11-05 PROCEDURE — 94640 AIRWAY INHALATION TREATMENT: CPT

## 2021-11-05 PROCEDURE — 250N000011 HC RX IP 250 OP 636: Performed by: THORACIC SURGERY (CARDIOTHORACIC VASCULAR SURGERY)

## 2021-11-05 PROCEDURE — 99233 SBSQ HOSP IP/OBS HIGH 50: CPT | Mod: 24 | Performed by: NURSE PRACTITIONER

## 2021-11-05 PROCEDURE — 250N000013 HC RX MED GY IP 250 OP 250 PS 637: Performed by: THORACIC SURGERY (CARDIOTHORACIC VASCULAR SURGERY)

## 2021-11-05 PROCEDURE — 70450 CT HEAD/BRAIN W/O DYE: CPT | Mod: 26 | Performed by: STUDENT IN AN ORGANIZED HEALTH CARE EDUCATION/TRAINING PROGRAM

## 2021-11-05 PROCEDURE — 82803 BLOOD GASES ANY COMBINATION: CPT | Performed by: STUDENT IN AN ORGANIZED HEALTH CARE EDUCATION/TRAINING PROGRAM

## 2021-11-05 PROCEDURE — 94003 VENT MGMT INPAT SUBQ DAY: CPT

## 2021-11-05 PROCEDURE — 94640 AIRWAY INHALATION TREATMENT: CPT | Mod: 76

## 2021-11-05 PROCEDURE — 83735 ASSAY OF MAGNESIUM: CPT | Performed by: STUDENT IN AN ORGANIZED HEALTH CARE EDUCATION/TRAINING PROGRAM

## 2021-11-05 PROCEDURE — 250N000009 HC RX 250: Performed by: STUDENT IN AN ORGANIZED HEALTH CARE EDUCATION/TRAINING PROGRAM

## 2021-11-05 PROCEDURE — 250N000012 HC RX MED GY IP 250 OP 636 PS 637: Performed by: PHYSICIAN ASSISTANT

## 2021-11-05 PROCEDURE — 80197 ASSAY OF TACROLIMUS: CPT | Performed by: STUDENT IN AN ORGANIZED HEALTH CARE EDUCATION/TRAINING PROGRAM

## 2021-11-05 PROCEDURE — 258N000003 HC RX IP 258 OP 636

## 2021-11-05 PROCEDURE — 70450 CT HEAD/BRAIN W/O DYE: CPT

## 2021-11-05 PROCEDURE — 200N000002 HC R&B ICU UMMC

## 2021-11-05 PROCEDURE — 250N000011 HC RX IP 250 OP 636: Performed by: INTERNAL MEDICINE

## 2021-11-05 PROCEDURE — 99233 SBSQ HOSP IP/OBS HIGH 50: CPT | Performed by: INTERNAL MEDICINE

## 2021-11-05 PROCEDURE — 74018 RADEX ABDOMEN 1 VIEW: CPT | Mod: 26 | Performed by: RADIOLOGY

## 2021-11-05 PROCEDURE — 84100 ASSAY OF PHOSPHORUS: CPT | Performed by: STUDENT IN AN ORGANIZED HEALTH CARE EDUCATION/TRAINING PROGRAM

## 2021-11-05 PROCEDURE — C9113 INJ PANTOPRAZOLE SODIUM, VIA: HCPCS | Performed by: NURSE PRACTITIONER

## 2021-11-05 PROCEDURE — 250N000011 HC RX IP 250 OP 636

## 2021-11-05 PROCEDURE — 250N000011 HC RX IP 250 OP 636: Performed by: NURSE PRACTITIONER

## 2021-11-05 PROCEDURE — 999N000065 XR ABDOMEN PORT 1 VIEWS

## 2021-11-05 PROCEDURE — 43235 EGD DIAGNOSTIC BRUSH WASH: CPT | Performed by: INTERNAL MEDICINE

## 2021-11-05 PROCEDURE — 999N000157 HC STATISTIC RCP TIME EA 10 MIN

## 2021-11-05 PROCEDURE — 258N000003 HC RX IP 258 OP 636: Performed by: NURSE PRACTITIONER

## 2021-11-05 PROCEDURE — 82040 ASSAY OF SERUM ALBUMIN: CPT | Performed by: STUDENT IN AN ORGANIZED HEALTH CARE EDUCATION/TRAINING PROGRAM

## 2021-11-05 PROCEDURE — 71045 X-RAY EXAM CHEST 1 VIEW: CPT | Mod: 26 | Performed by: RADIOLOGY

## 2021-11-05 RX ORDER — FUROSEMIDE 10 MG/ML
20 INJECTION INTRAMUSCULAR; INTRAVENOUS ONCE
Status: COMPLETED | OUTPATIENT
Start: 2021-11-05 | End: 2021-11-05

## 2021-11-05 RX ORDER — HEPARIN SODIUM 10000 [USP'U]/100ML
0-5000 INJECTION, SOLUTION INTRAVENOUS CONTINUOUS
Status: DISCONTINUED | OUTPATIENT
Start: 2021-11-05 | End: 2021-11-10

## 2021-11-05 RX ORDER — SIMETHICONE 40MG/0.6ML
SUSPENSION, DROPS(FINAL DOSAGE FORM)(ML) ORAL PRN
Status: COMPLETED | OUTPATIENT
Start: 2021-11-05 | End: 2021-11-05

## 2021-11-05 RX ORDER — HEPARIN SODIUM 10000 [USP'U]/100ML
0-5000 INJECTION, SOLUTION INTRAVENOUS CONTINUOUS
Status: DISCONTINUED | OUTPATIENT
Start: 2021-11-05 | End: 2021-11-05

## 2021-11-05 RX ORDER — FENTANYL CITRATE 50 UG/ML
200 INJECTION, SOLUTION INTRAMUSCULAR; INTRAVENOUS ONCE
Status: COMPLETED | OUTPATIENT
Start: 2021-11-05 | End: 2021-11-05

## 2021-11-05 RX ORDER — TACROLIMUS 0.5 MG/1
1 CAPSULE ORAL
Status: DISCONTINUED | OUTPATIENT
Start: 2021-11-06 | End: 2021-11-06

## 2021-11-05 RX ORDER — METOCLOPRAMIDE HYDROCHLORIDE 5 MG/ML
10 INJECTION INTRAMUSCULAR; INTRAVENOUS ONCE
Status: COMPLETED | OUTPATIENT
Start: 2021-11-05 | End: 2021-11-05

## 2021-11-05 RX ADMIN — NYSTATIN 1000000 UNITS: 500000 SUSPENSION ORAL at 08:00

## 2021-11-05 RX ADMIN — CALCIUM CARBONATE 600 MG (1,500 MG)-VITAMIN D3 400 UNIT TABLET 1 TABLET: at 08:00

## 2021-11-05 RX ADMIN — ACETYLCYSTEINE 2 ML: 200 SOLUTION ORAL; RESPIRATORY (INHALATION) at 19:50

## 2021-11-05 RX ADMIN — LIDOCAINE 2 PATCH: 560 PATCH PERCUTANEOUS; TOPICAL; TRANSDERMAL at 07:59

## 2021-11-05 RX ADMIN — SODIUM CHLORIDE, PRESERVATIVE FREE 10 ML: 5 INJECTION INTRAVENOUS at 17:39

## 2021-11-05 RX ADMIN — SODIUM CHLORIDE 8 MG/HR: 9 INJECTION, SOLUTION INTRAVENOUS at 01:01

## 2021-11-05 RX ADMIN — MYCOPHENOLATE MOFETIL 1000 MG: 200 POWDER, FOR SUSPENSION ORAL at 19:45

## 2021-11-05 RX ADMIN — FUROSEMIDE 20 MG: 10 INJECTION, SOLUTION INTRAMUSCULAR; INTRAVENOUS at 09:27

## 2021-11-05 RX ADMIN — FLUCONAZOLE IN SODIUM CHLORIDE 400 MG: 2 INJECTION, SOLUTION INTRAVENOUS at 12:02

## 2021-11-05 RX ADMIN — FENTANYL CITRATE 100 MCG: 50 INJECTION INTRAMUSCULAR; INTRAVENOUS at 09:38

## 2021-11-05 RX ADMIN — ACYCLOVIR 400 MG: 200 SUSPENSION ORAL at 08:00

## 2021-11-05 RX ADMIN — ACETYLCYSTEINE 2 ML: 200 SOLUTION ORAL; RESPIRATORY (INHALATION) at 16:32

## 2021-11-05 RX ADMIN — MYCOPHENOLATE MOFETIL 1000 MG: 200 POWDER, FOR SUSPENSION ORAL at 08:01

## 2021-11-05 RX ADMIN — ERYTHROMYCIN LACTOBIONATE 250 MG: 500 INJECTION, POWDER, LYOPHILIZED, FOR SOLUTION INTRAVENOUS at 09:20

## 2021-11-05 RX ADMIN — TACROLIMUS 1 MG: 1 CAPSULE ORAL at 17:53

## 2021-11-05 RX ADMIN — MIDAZOLAM 2 MG: 1 INJECTION INTRAMUSCULAR; INTRAVENOUS at 09:38

## 2021-11-05 RX ADMIN — LEVALBUTEROL HYDROCHLORIDE 1.25 MG: 1.25 SOLUTION RESPIRATORY (INHALATION) at 16:31

## 2021-11-05 RX ADMIN — NYSTATIN 1000000 UNITS: 500000 SUSPENSION ORAL at 16:21

## 2021-11-05 RX ADMIN — NYSTATIN 1000000 UNITS: 500000 SUSPENSION ORAL at 19:44

## 2021-11-05 RX ADMIN — METOPROLOL TARTRATE 25 MG: 25 TABLET, FILM COATED ORAL at 19:44

## 2021-11-05 RX ADMIN — CALCIUM CARBONATE 600 MG (1,500 MG)-VITAMIN D3 400 UNIT TABLET 1 TABLET: at 17:39

## 2021-11-05 RX ADMIN — AMIODARONE HYDROCHLORIDE 200 MG: 200 TABLET ORAL at 08:00

## 2021-11-05 RX ADMIN — SIMETHICONE 133 MG: 20 SUSPENSION/ DROPS ORAL at 09:20

## 2021-11-05 RX ADMIN — LEVALBUTEROL HYDROCHLORIDE 1.25 MG: 1.25 SOLUTION RESPIRATORY (INHALATION) at 12:15

## 2021-11-05 RX ADMIN — LEVALBUTEROL HYDROCHLORIDE 1.25 MG: 1.25 SOLUTION RESPIRATORY (INHALATION) at 19:50

## 2021-11-05 RX ADMIN — ACYCLOVIR 400 MG: 200 SUSPENSION ORAL at 19:44

## 2021-11-05 RX ADMIN — METOPROLOL TARTRATE 25 MG: 25 TABLET, FILM COATED ORAL at 08:00

## 2021-11-05 RX ADMIN — ACETYLCYSTEINE 2 ML: 200 SOLUTION ORAL; RESPIRATORY (INHALATION) at 12:15

## 2021-11-05 RX ADMIN — LEVOTHYROXINE SODIUM 25 MCG: 0.03 TABLET ORAL at 08:00

## 2021-11-05 RX ADMIN — MULTIVIT AND MINERALS-FERROUS GLUCONATE 9 MG IRON/15 ML ORAL LIQUID 15 ML: at 08:00

## 2021-11-05 RX ADMIN — PREDNISOLONE 12.5 MG: 15 SOLUTION ORAL at 19:44

## 2021-11-05 RX ADMIN — PREDNISOLONE 15 MG: 15 SOLUTION ORAL at 08:00

## 2021-11-05 RX ADMIN — SODIUM CHLORIDE 8 MG/HR: 9 INJECTION, SOLUTION INTRAVENOUS at 06:30

## 2021-11-05 RX ADMIN — NYSTATIN 1000000 UNITS: 500000 SUSPENSION ORAL at 12:02

## 2021-11-05 RX ADMIN — SODIUM CHLORIDE 8 MG/HR: 9 INJECTION, SOLUTION INTRAVENOUS at 16:29

## 2021-11-05 RX ADMIN — TACROLIMUS 0.5 MG: 0.5 CAPSULE ORAL at 10:33

## 2021-11-05 RX ADMIN — METOCLOPRAMIDE HYDROCHLORIDE 10 MG: 5 INJECTION INTRAMUSCULAR; INTRAVENOUS at 10:48

## 2021-11-05 ASSESSMENT — ACTIVITIES OF DAILY LIVING (ADL)
ADLS_ACUITY_SCORE: 22
ADLS_ACUITY_SCORE: 24
ADLS_ACUITY_SCORE: 22
ADLS_ACUITY_SCORE: 24
ADLS_ACUITY_SCORE: 24
ADLS_ACUITY_SCORE: 22
ADLS_ACUITY_SCORE: 24
ADLS_ACUITY_SCORE: 22
ADLS_ACUITY_SCORE: 24
ADLS_ACUITY_SCORE: 22
ADLS_ACUITY_SCORE: 24

## 2021-11-05 ASSESSMENT — MIFFLIN-ST. JEOR: SCORE: 1455

## 2021-11-05 NOTE — PROGRESS NOTES
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  UGIB causing code blue on 11/2.     OVERNIGHT EVENTS:  TF held for refluxing TF into G tube, concern for ileus      TODAY'S PROGRESS:   EGD per GI  Lasix x1 20mg  Resume TF and low intensity heparin gtt after EGD   D/w ID regarding cx - no indication to treat Staph epi from tracheal aspirate     PLAN:  Neuro/ pain/ sedation:  Acute postoperative pain   Anxiety and depression  Acute to subacute CVA, b/l cerebral hemispheres and L cerebellum, suspect embolic  Encephalopathy and diffuse weakness - improving slightly   R ear lateral canal floor excoriation with bleeding - resolved   - Pain and sedation: none  - APAP and oxycodone prn, lido patch   - appreciate ENT  - appreciate neuro    Pulmonary care:   SALTY on home CPAP  Acute hypoxic respiratory failure s/p b/l lung transplant 10/16/21  S/p tracheostomy 10/29  Empyema with Candida s/p chest tube 11/3/21  - mechanically ventilated via trach, PST BID  - Levalbuterol nebs and Mucomyst, chest PT  - Daily CXR  - appreciate Pulmonary  - lasix 20mg x1 today      Cardiovascular:    HTN  Afib   PFO  Vasoplegic shock in setting of hypovolemia - resolved  - Monitor hemodynamic status.   - MAP goal <100, SBP <140  - metoprolol 25 BID    - prn labetalol  - hold statin 2/2 elevated LFTs  - Amiodarone 200 mg daily   - low-intensity heparin gtt after EGD     GI care/ Nutrition:   Elevated LFTs - recurrent  GI bleed 2/2 NG trauma, now recurrent secondary to GJ bumper ulcer   Moderate malnutrition in the context of acute on chronic illness  PEG-J, with malpositioned J tube   -  Diet: resume TF slowly after EGD, no e/o ileus    - PPI gtt for now   - EGD today with erythromcyin per GI   - thoracic to correct J tube position    Renal/ Fluid Balance/ Electrolytes:   Acute kidney injury, recurrent, now with azotemia - resolving   Hypernatremia  Hyperkalemia, resolved   BL creat appears to be ~ 0.7  - free water flushes to 100 q1h today   - Strict I/O, daily weights  - Avoid/limit nephrotoxins as able  - Replete lytes PRN per protocol     Endocrine:    Stress induced hyperglycemia with steroid-induced component, on DM2  Hypothyroidism  RA  Preop A1c 6.6  - insulin gtt as needed   - Goal BG <180 for optimal healing  - continue home synthroid     ID/ Antibiotics:  Immunosuppression s/p transplant  Candidal infection of donor lungs (likely source), pleural fluid, and fungemia   - NGTD CSF. Last + blood cx 10/22. No vegetations on valves per TC 10/23  - continue fluconazole pending clinical course  - d/w ID, no indication to treat Staph epi in sputum   - Nystatin, Acyclovir, resume bactrim   - Tacrolimus (via g tube), prednisone    - appreciate transplant ID, ophthalmology   - Continue to monitor fever curve, WBC and inflammatory markers as appropriate     Heme:     Acute blood loss anemia secondary to surgery and GI bleed, and anemia related to critical illness and iron deficiency    Hypogammaglobulinemia s/p IVIG  Thrombocytopenia, HIT negative - resolved   Lactic acidosis, resolved  Nonocclusive R subclavian thrombus    - iron studies consistent with iron deficiency, will not treat for now given infection  - hgb q12  - heparin low intensity after EGD    MSK/ Skin:  Clamshell surgical incision  - Sternal precautions  - Postoperative incision management per protocol  - PT/OT/CR     Prophylaxis:    - Mechanical prophylaxis for DVT: SCDs  - Chemical DVT prophylaxis: heparin low intensity gtt    - PPI for 30 days postoperatively     Lines/ tubes/ drains:  - trach  - peg-j  - Shirley 10/25  - PIVs  -  chest tube L  - left radial a line  - L PICC     Disposition:  - CVICU    Care conference to update family to be planned for early next week    Patient seen, findings and plan discussed with CVICU staff.     Altagracia Allan MD  Surgery Resident PGY-3  Pg 1970    ====================================    SUBJECTIVE:   G tube put out TF overnight, so TF stopped.      OBJECTIVE:   1. VITAL SIGNS:   Temp:  [97.8  F (36.6  C)-99  F (37.2  C)] 98.6  F (37  C)  Pulse:  [] 82  Resp:  [16-36] 23  BP: (101-134)/(51-75) 134/51  MAP:  [63 mmHg-148 mmHg] 84 mmHg  Arterial Line BP: ()/() 129/66  FiO2 (%):  [40 %] 40 %  SpO2:  [96 %-100 %] 99 %  Ventilation Mode: CMV/AC  (Continuous Mandatory Ventilation/ Assist Control)  FiO2 (%): 40 %  Rate Set (breaths/minute): 12 breaths/min  Tidal Volume Set (mL): 400 mL  PEEP (cm H2O): 8 cmH2O  Pressure Support (cm H2O): 7 cmH2O  Oxygen Concentration (%): 40 %  Resp: 23    2. INTAKE/ OUTPUT:   I/O last 3 completed shifts:  In: 1441.35 [I.V.:716.35; NG/GT:350]  Out: 3715 [Urine:2870; Emesis/NG output:635; Stool:110; Chest Tube:100]    3. PHYSICAL EXAMINATION:   General: NAD  Neuro: opens eyes to voice, wiggles toes BLE to command, no movement BUE to noxious, grimacing only to noxious of L hand and not to R - stable exam from yesterday    Resp: vented via trach, nonlabored   CV: RRR on tele   Abdomen: soft, nontender, rounded   Incisions: c/d/i  Extremities: warm and well perfused, no edema    4. INVESTIGATIONS:   Arterial Blood Gases   Recent Labs   Lab 11/05/21  0504 11/05/21  0336 11/04/21  1016 11/04/21  0550   PH 7.49* 7.49* 7.46* 7.47*   PCO2 31* 32* 35 34*   PO2 126* 331* 95 100   HCO3 24 24 25 25     Complete Blood Count   Recent Labs   Lab 11/05/21  0337 11/04/21  1816 11/04/21  0339 11/03/21  1652   WBC 18.5* 21.4* 25.1* 26.9*   HGB 8.9* 9.1* 9.1* 9.2*    182 173 156     Basic Metabolic Panel  Recent Labs   Lab 11/05/21  0657 11/05/21  0615 11/05/21  0508  11/05/21 0337 11/04/21  1019 11/04/21  1015 11/04/21  0351 11/04/21  0339 11/03/21  2210 11/03/21  2204   NA  --   --   --  151*  --  151*  --  148*  148*  --  150*   POTASSIUM  --   --   --  4.1  --  3.5  --  4.0  4.0  --  4.1   CHLORIDE  --   --   --  123*  --  121*  --  121*  121*  --  119*   CO2  --   --   --  24  --  26  --  23  23  --  24   BUN  --   --   --  60*  --  80*  --  86*  86*  --  91*   CR  --   --   --  0.71  --  0.88  --  0.88  0.88  --  0.97   * 117* 119* 130*   < > 169*   < > 138*  138*   < > 160*    < > = values in this interval not displayed.     Liver Function Tests  Recent Labs   Lab 11/05/21 0337 11/04/21 0339 11/03/21 0407 11/02/21  1211 11/02/21  1053 11/02/21  0832   AST 59* 117* 297* 450*  --    < >   * 262* 356* 392*  --    < >   ALKPHOS 130 129 146 182*  --    < >   BILITOTAL 0.5 0.4 0.5 0.6  --    < >   ALBUMIN 1.8* 1.7* 1.8* 1.5*  --    < >   INR 1.27* 1.32* 1.36*  --  1.55*  --     < > = values in this interval not displayed.     Pancreatic Enzymes  No lab results found in last 7 days.  Coagulation Profile  Recent Labs   Lab 11/05/21 0337 11/04/21 0339 11/03/21  0407 11/02/21  1053 11/02/21  0927 11/02/21  0908 11/02/21  0832   INR 1.27* 1.32* 1.36* 1.55*  --    < > 1.38*   PTT  --   --   --  34 32  --  50*    < > = values in this interval not displayed.        =========================================

## 2021-11-05 NOTE — PROGRESS NOTES
[facility]  Transplant Infectious Disease Progress Note -- Sign Off     Patient:  Edson Thornton, Date of birth 1965, Medical record number [id number]  Date of Visit:  11/05/2021         Assessment and Recommendations:   Recommendations:  - Would not treat the Staph epidermidis in the 11/2/21 respiratory cultures -- that is normal, nonpathogenic, nonpneumonic jean marie that is expected to be more prominent in cultures from someone who has been treated with extensive, broad-spectrum antimicrobials.  - Continue fluconazole 400 mg daily through 11/25/21, to give >> two weeks (after PICC line was removed for the candidemia) and also three weeks (from the date of replacement of the left chest tube on 11/3/21) for the persistent left Candida empyema.  - Need to make certain by imaging that the left pleural effusion is almost entirely resolved (with no residual loculations) before removing the chest tube drainage and concluding the fluconazole course at the end of November.  - Resume TMP-SMX prophylaxis.  - Continue acyclovir prophylaxis.  - Would not add any antimicrobials now (in the absence of overt sepsis or a clearcut new indication.)    The case was discussed with the CVICU team today.  Since he is now afebrile and without evidence of any ongoing active infection (beyond the left fungal pleural empyema), Transplant ID will tentatively sign off now.  Thanks for allowing us to participate in the care of this gentleman.  Please page Transplant ID again if any additional ID questions or concerns arise.    Prem Soares MD  Pager [phone]    Assessment:  A 56 year old gentleman immunosuppressed (tacrolimus, mycophenolate, prednisone) s/p a 10/16/21 bilateral lung transplant for NSIP / ILD with rheumatoid arthritis who also has a history of bronchiectasis, moderate PH, SALTY, chronic HSV infection, hypogammaglobulinemia, steroid-induced diabetes, hypothyroidism, hypertension,  hyperlipidemia, duodenal anomaly, anxiety, and depression.  He was admitted to [facility] on 9/5/21 for acute on chronic respiratory failure due to an ILD exacerbation and underwent lung transplant on 10/16/21.  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  He had fevers on 10/19/21 and on 10/20/21 and a corresponding 10/20/21 blood culture grew pan-susceptible Candida albicans.  He is stable but ventilator dependent (s/p 10/29/21 tracheostomy) with a slowly improving multifactorial encephalopathy (in part due to CVAs) and a persistent but improving leukocytosis.    ID issues:    - Persistent left pleural Candida albicans empyema:  Candida was initially isolated from left pleural fluid on 10/25/21 (with Candida on Gram stain) and was presumably seeded by his 10/20/21 fungemia.  The final left post-operative pleural chest tube was prematurely removed on 10/28/21 but the pleural effusion persisted and a draining chest tube was reinserted on 11/3/21, with that 11/3/21 pleural fluid culture again growing C albicans.  (Cytopathology of the pleural fluid was negative.)  Since the original Candida isolate from the 10/20/21 blood culture was susceptible to fluconazole, the pleural fluid isolate can be assumed to be susceptible as well, so ongoing fluconazole treatment is appropriate.  Treating with an additional three weeks (through 11/25/21) of fluconazole from the date of the 11/3/21 re-drainage should be sufficient, but imaging should show the left pleural effusion is almost entirely resolved and not loculated before the fluconazole is discontinued.    - Candidemia in a single 10/20/21 blood culture:  Although only one blood culture (out of six post-transplant to that point) grew Candida, that positive blood culture did correspond to a significant fever spike on 10/20/21 late PM and he had a strongly positive 10/20/21 Fungitell assay, so this likely represented a true candidemia and has been treated as such.   He was at risk for candidemia due to the presence of multiple lines.  The the PICC line on 10/25/21 (with other lines removed previously),  There was no visible site of organ involvement or of hepatosplenic candidiasis on the 10/22/21 and 10/25/21 chest / abdomen CT scans (except for the left pleural effusion).  A 10/23/21 TTE showed no evidence of valvular vegetations.   A 10/24/21 Ophthalmology Consult examination was benign.  10/22/21 x 2, 10/23/21 x 2, and 10/25/21 x 3 surveillance blood cultures were all without growth.  Empiric therapy with IV micafungin at Candida treatment dose 100 mg daily was initially given, but since the C albicans was quite susceptible to fluconazole (TESSIE 0.5) and his LFTs had normalized, micafungin was switched to fluconazole on 10/26/21 to complete a (two+ week) treatment course (dating from the PICC line date of removal on 10/25/21).  The antifungal course will be extended beyond that due to the fungal empyema, however.  Tacrolimus doses have been aptly monitored and adjusted for the addition of fluconazole.  With consistently negative surveillance blood cultures and a line holiday, a new replacement central line (a left PICC) was inserted on 11/4/21.    - Acute, cryptogenic 10/25/21 leukocytosis / fever:  The etiology was uncertain, but seem most lost to have been due to the undrained left pleural Candida empyema, because the leukocytosis has steadily improved after the new left pleural chest tube was placed on 11/3/21, from a WBC peak of 57.4 on 11/2/21 down to 17.5 on 11/5/21.  This fever was unlikely to be due to the original 10/20/21 candidemia itself, since his blood cultures cleared quickly as of 10/22/21.  Other fever evaluation studies were all negative.  For example, a 10/29/21 AM procalcitonin was quite low at 0.38 (with only mild renal insufficiency, creatinine 1.23).  Despite absence of clear indication, empiric meropenem was added on 10/28/21 evening and given for five+  days through 11/2/21 without obvious benefit.    - Sputum Gram stain from 10/25/21 showing Gram positive cocci and tracheal sputum culture from 11/2/21 growing Staph epidermidis:  Gram positive cocci, especially Staph epidermidis, in respiratory cultures is normal, nonpathogenic, nonpneumonic jean marie that is more likely cultured from someone who has been treated with extensive, broad-spectrum antimicrobials.  It should not be treated.  He lacks evidence of any recent new pneumonia of any type -- chest x-rays are stably unimpressive without new focal consolidations.    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  The CSF was bloody, but there was no evident pleocytosis.  The RBC adjusted CSF WBC count calculates to roughly zero.  The [employer] CSF PCR panel was negative for herpesviral and other pathogens and a CSF CRAG was negative.    Old ID issues:  - Concern for possible Strongyloides exposure (because a [place]  and in multiple countries with endemic Strongyloides) pre-transplant so received ivermectin dose on 9/17/21.  - Plan for monthly Coccidioides Ag x12 months post-transplant per ID (urine and serum negative 10/17), next due 11/17 (not yet ordered).  Has calcified granulomas on chest CT.  Has a history of residing in a [place]occid[place] endemic areas [place] and[place]or Histoplasma endemic area ([place]   - Pre-transplant indeterminate Quantiferon assay:  Assessed 9/16/21 by Transplant ID.  Had been in the [employer] and was stationed in many states including [place] [facility]. Also was stationed in [facility] and [facility] and maybe [place].  He was tested with tuberculin skin test on numerous occasions before he became immunocompromised and was never positive. In addition, he was tested with QuantIFERON on  5/11/2021 prior to, or within 24 hr of receiving 10 mg/day of prednisone, the QuantiFERON was negative.  Deemed to no have LTBI.  - Pneumonia treated with a short course of antibiotics in [place].      Other ID issues:  - QTc interval:  466 msec on 10/22/21 EKG.  - Bacterial prophylaxis:  None indicated, has been on post-transplant ceftazidime and then meropenem from 10/28 - 11/2/21.  - Pneumocystis prophylaxis:  On TMP-SMX prior to transplant, initially held on admission for BRENNAN, resumed 10/27/21, held again on 11/2/21.  - Viral serostatus & prophylaxis:  CMV negative.  Chronic history of intermittent active HSV oral outbreaks, on acyclovir.  EBV / VZV seropositive.  - Fungal prophylaxis:  On treatment fluconazole.  - Immunization status:  Not presently relevant.  Did not receive influenza, pneumococcal, or Covid-19 vaccines pre-transplant.  - Gamma globulin status:  History of hypogammaglobulinemia.  Received IVIG on 10/21/21.  - Isolation status: Routine.        Interval History:   Mr. Thornton has now been afebrile (T max 99.5 degrees F) since 11/2/21 midday.  His peripheral leukocytosis continues to improve (after placement of the 11/3/21 left pleural chest tube for Candida empyema) down to 17.5 today (from a peak of 57.4 on 11/2/21).   He remains on fluconazole 400 mg daily (since 10/26/21, for candidemia and left Candida empyema) as well as acyclovir prophylaxis (since his 10/16/21 transplant).  TMP-SMX has been on hold since 11/2/21 but can now be resumed.  He remains ventilator dependent via the tracheostomy (placed 10/29/21).  He is still sleepy but more responsive -- the 11/2/21 and 11/5/21 head CT scans show evolving bilateral small CVAs.  His orville-GJ tube 11/2/21 GI bleed resolved after clipping and was confirmed by a clean 11/4/21 EGD.  He is back on tube feeds via the PEG.  Chest x-rays show stable small pleural effusions and bibasilar atelectasis without new consolidations after the new left pleural  chest tube was placed on 11/3/21.  The pleural fluid culture from that grew 1+ C albicans.  A 11/2/21 tracheostomy sputum culture grew Staph epidermidis and C albicans -- both normal jean marie from that site.  A repeat BAL on 10/30/21 also showed yeast and staphoid Gram positive cocci on cytology / Gram stain, with only normal jean marie and yeast grown in the cultures.  A 10/26/21 BAL culture grew C albicans.  CSF studies from the 10/29/21 traumatic LP were negative (with a WBC count difficult to interpret but probably normal given 9,000 RBCs in tube 4).  The 10/20/21 blood culture Candida albicans isolate was pan-susceptible (micafungin TESSIE <0.03, fluconazole TESSIE 0.5).  Subsequent 10/22/21 x 2, 10/23/21 x 2, and 10/25/21 x 3 surveillance blood cultures were all negative.  His prior right arm PICC was removed 10/25/21 and he had a prolonged central line holiday before a new left PICC was placed on 11/4/21.  10/22/21 and 10/25/21 abdominal CT scans showed no evidence of hepatosplenic candidiasis or other intrabdominal infection.  10/23 TTE showed no vegetations.  A 10/24/21 Ophthalmology Consult examination was benign.        History of Infectious Disease Illness (copied from the 10/23/21 Transplant ID Consult Note):   A 56 year old gentleman immunosuppressed (tacrolimus, mycophenolate, prednisone) s/p a 10/16/21 bilateral lung transplant for NSIP / ILD with rheumatoid arthritis who also has a history of bronchiectasis, moderate PH, SALTY, chronic HSV infection, hypogammaglobulinemia, steroid-induced diabetes, hypothyroidism, hypertension, hyperlipidemia, duodenal anomaly, anxiety, and depression.  He was admitted to [facility] on 9/5/21 for acute on chronic respiratory failure due to an ILD exacerbation and underwent lung transplant on 10/16/21.  The transplant surgery was complicated by early low cardiac index and immediate post-operative PGD as well as later atrial fibrillation with RVR on 10/18/21 as well as BRENNAN.  He is now day  7 post transplant and remains intubated in the CVICU on inhaled nitrous oxide with compromised capacity to wean off sedation.  He had immediate post-operative fevers for ~ 24 hours, then was afebrile for ~ 1.5 days, then respiked fevers up to 101.3 degrees F on 10/19/21 late PM and 10/20/21 late PM.  He had another low grade fever to 100.9 degrees on 10/22/21 PM.  He has been on empiric ceftazidime since transplant, as well as acyclovir prophylaxis (and was on TMP-SMX prophylaxis prior to transplant).  His post-operative peripheral WBC peaked at 44.5 on 10/18/21, fell to 27.4 by 10/20/21, and is now at 21.3.  Blood cultures x 4 obtained on 10/17/21 were negative, but one of two blood cultures peripherally drawn on 10/20/21 early AM (with the fever spike) grew yeast (ID pending) on 10/22/21 at 52 hours of incubation; the other peripherally drawn blood culture lacks growth to date.  Surveillance blood cultures x 2 from 10/22/21 afternoon are without growth to date.  A Fungitell assay was positive at 399 on 10/20/21.  Of note, respiratory (broncial washing, ETT sputum) cultures obtained 10/17/21 and 10/20/21 have grown Candida albicans along with Staph epidermidis and other normal jean marie.  IV micafungin 100 mg daily was instituted on 10/22/21 late AM.  A 10/22/21 chest / abdominal CT scan did not reveal any likely source.  He has a history of potential Coccidioides exposure, but a 10/17/21 Coccidioides antibody assay was negative and a Coccidioides antigen assay is pending.  This morning he had some blood in his OG tube and underwent upper endoscopy.  He remains intubated but is opening his eyes to auditory stimulation and tracking today, although not moving to request.  He is hemodynamically stable.    Exposure History:  Had been in the [employer] and was stationed in many states including [facility] and [facility]. Also was stationed in [facility] and [facility] and maybe [place].  Has a history of residing in a  [place]idioides endemic areas [place] and/or Histoplasma endemic area ([place]  Extensive travel as a  within the [place].      Transplants:  10/16/2021 (Lung), Postoperative day:  20.  Coordinator Kassandra Estrada    Review of Systems:  JASPER is unobtainable because he is intubated, sedated, and unresponsive on the ventilator.    Past Medical History:   Diagnosis Date     BRENNAN (acute kidney injury) (H) 10/17/2021     Anxiety      Depression      HLD (hyperlipidemia)      HTN (hypertension)      Hypothyroidism      ILD (interstitial lung disease) (H)      SALTY on CPAP      Oxygen dependent     BL 4L since ~6/2021     Rheumatoid arthritis (H)     signs ~5/2020, dx 5/2021     S/P lung transplant (H) 10/16/2021     Shock liver 10/17/2021     Steroid-induced hyperglycemia      Traction bronchiectasis (H)      Past Surgical History:   Procedure Laterality Date     COLONOSCOPY W/ BIOPSIES AND POLYPECTOMY  07/21/2020     CV CORONARY ANGIOGRAM N/A 09/08/2021    Procedure: Coronary Angiogram with possible intervention;  Surgeon: Jovon Bullock MD;  Location:  HEART CARDIAC CATH LAB     CV RIGHT HEART CATH MEASUREMENTS RECORDED N/A 09/08/2021    Procedure: Right Heart Cath;  Surgeon: Jovon Bullock MD;  Location:  HEART CARDIAC CATH LAB     ESOPHAGOSCOPY, GASTROSCOPY, DUODENOSCOPY (EGD), COMBINED N/A 10/23/2021    Procedure: ESOPHAGOGASTRODUODENOSCOPY (EGD);  Surgeon: Miquel Pisano MD;  Location:  GI     ESOPHAGOSCOPY, GASTROSCOPY, DUODENOSCOPY (EGD), COMBINED N/A 11/02/2021    Procedure: ESOPHAGOGASTRODUODENOSCOPY (EGD);  Surgeon: Daniel Ortiz MD;  Location:  GI     EXTRACTION(S) DENTAL N/A 09/22/2021    Procedure: EXTRACTION tooth #19;  Surgeon: Deepak Tobin DDS;  Location:  OR     HERNIA REPAIR       IR CHEST TUBE PLACEMENT NON-TUNNELLED LEFT  11/03/2021     PICC TRIPLE LUMEN PLACEMENT Left 11/04/2021    5FR TL PICC. Right non occlusive thrombus subclavian vein.     right acl        TRACHEOSTOMY PERCUTANEOUS N/A 10/29/2021    Procedure: Percutaneous Tracheostomy,;  Surgeon: Celine Jenkins MD;  Location: [facility] OR     TRANSPLANT LUNG RECIPIENT SINGLE X2 Bilateral 10/16/2021    Procedure: TRANSPLANT, LUNG, RECIPIENT, BILATERAL, Bronchoscopy, on-pump perfusion, bilateral clamshell sternotomy;  Surgeon: Yanick Corral MD;  Location: [facility] OR     XR ACROMIOCLAVICULAR JOINT BILATERAL       Family History   Problem Relation Age of Onset     Diabetes Type 1 Mother      Heart Disease Mother      Chronic Obstructive Pulmonary Disease Mother      Rheumatoid Arthritis Father      Emphysema Paternal Grandfather      Social History     Tobacco Use     Smoking status: Never Smoker     Smokeless tobacco: Never Used   Substance Use Topics     Alcohol use: Never     Drug use: Never       There is no immunization history on file for this patient.    Patient Active Problem List   Diagnosis     S/P lung transplant (H)     BRENNAN (acute kidney injury) (H)     Shock liver          Current Medications & Allergies:       acetylcysteine  2 mL Nebulization 4x Daily     acyclovir  400 mg Oral or J tube BID     amiodarone  200 mg Oral or Feeding Tube Daily     calcium carbonate 600 mg-vitamin D 400 units  1 tablet Oral or Feeding Tube BID w/meals     fluconazole  400 mg Intravenous Q24H     heparin lock flush  5-20 mL Intracatheter Q24H     [Held by provider] insulin glargine  40 Units Subcutaneous QAM AC     levalbuterol  1.25 mg Nebulization 4x Daily     levothyroxine  25 mcg Oral Daily     lidocaine  2 patch Transdermal Q24H     lidocaine   Transdermal Q8H     metoprolol tartrate  25 mg Oral BID     multivitamins w/minerals  15 mL Per Feeding Tube Daily     mycophenolate  1,000 mg Per J Tube BID     nystatin  1,000,000 Units Swish & Swallow 4x Daily     [START ON 11/7/2021] prednisoLONE  12.5 mg Per J Tube 2 times daily     prednisoLONE  12.5 mg Per J Tube QPM     prednisoLONE  15 mg Per J Tube QAM      [Held by provider] rosuvastatin  10 mg Oral or Feeding Tube Daily     senna-docusate  1 tablet Oral or Feeding Tube BID     sodium chloride (PF)  10-40 mL Intracatheter Q8H     sodium chloride (PF)  3 mL Intracatheter Q8H     sulfamethoxazole-trimethoprim  10 mL Oral or J tube Daily    Or     sulfamethoxazole-trimethoprim  1 tablet Oral or Feeding Tube Daily     tacrolimus  0.5 mg Sublingual QAM     tacrolimus  1 mg Sublingual QPM     Infusions/Drips:      dextrose 30 mL/hr at 11/02/21 2000     dextrose Stopped (10/24/21 1008)     [Held by provider] heparin       insulin regular Stopped (11/04/21 1900)     pantoprazole (PROTONIX) infusion ADULT/PEDS GREATER than or EQUAL to 45 kg 8 mg/hr (11/05/21 1000)     BETA BLOCKER NOT PRESCRIBED       No Known Allergies         Physical Exam:     Patient Vitals for the past 24 hrs:   BP Temp Temp src Pulse Resp SpO2 Weight   11/05/21 1030 -- -- -- 67 -- 98 % --   11/05/21 1020 133/67 -- -- 65 -- 99 % --   11/05/21 1010 115/65 -- -- 66 -- 95 % --   11/05/21 1000 133/67 -- -- 67 16 97 % --   11/05/21 0950 120/71 -- -- 71 18 95 % --   11/05/21 0940 (!) 144/71 -- -- 73 20 93 % --   11/05/21 0930 (!) 144/78 -- -- 73 26 92 % --   11/05/21 0915 -- -- -- 75 26 97 % --   11/05/21 0830 (!) 147/90 -- -- 73 -- 100 % --   11/05/21 0800 -- 98.5  F (36.9  C) Axillary 79 22 100 % --   11/05/21 0700 -- -- -- 82 23 99 % --   11/05/21 0600 -- -- -- 80 21 99 % --   11/05/21 0500 -- -- -- 80 24 99 % --   11/05/21 0400 -- 98.6  F (37  C) Axillary 81 21 98 % --   11/05/21 0300 -- -- -- 81 16 98 % --   11/05/21 0200 -- -- -- 86 19 99 % --   11/05/21 0100 -- -- -- 80 29 98 % --   11/05/21 0000 -- 98.7  F (37.1  C) Axillary 84 25 98 % 71.4 kg (157 lb 6.5 oz)   11/04/21 2300 -- -- -- 85 22 100 % --   11/04/21 2200 -- -- -- 82 21 99 % --   11/04/21 2100 -- -- -- 78 28 98 % --   11/04/21 2000 -- 98.7  F (37.1  C) Axillary 86 22 98 % --   11/04/21 1900 -- -- -- 101 27 98 % --   11/04/21 1845 -- -- -- 104  -- 97 % --   11/04/21 1830 -- -- -- 107 -- 98 % --   11/04/21 1815 -- -- -- 110 -- 98 % --   11/04/21 1800 -- -- -- 105 -- 98 % --   11/04/21 1745 -- -- -- 106 -- 97 % --   11/04/21 1730 -- -- -- 108 -- 96 % --   11/04/21 1715 -- -- -- 100 -- 96 % --   11/04/21 1700 -- -- -- 105 -- 97 % --   11/04/21 1645 -- -- -- 106 -- 97 % --   11/04/21 1630 -- -- -- 109 -- 96 % --   11/04/21 1615 -- -- -- 112 -- 98 % --   11/04/21 1600 -- 97.8  F (36.6  C) Axillary 105 (!) 36 97 % --   11/04/21 1548 134/51 -- -- 116 -- 97 % --   11/04/21 1545 -- -- -- 108 -- 97 % --   11/04/21 1530 -- -- -- 105 -- 97 % --   11/04/21 1515 -- -- -- 108 -- 98 % --   11/04/21 1500 -- -- -- 109 -- 97 % --   11/04/21 1445 -- -- -- 110 -- 98 % --   11/04/21 1430 -- -- -- 109 -- 98 % --   11/04/21 1415 -- -- -- 110 -- 98 % --   11/04/21 1400 -- -- -- 109 -- 98 % --   11/04/21 1345 -- -- -- 108 -- 98 % --   11/04/21 1330 -- -- -- 109 -- 97 % --   11/04/21 1315 -- -- -- 107 -- 97 % --   11/04/21 1300 -- -- -- 105 -- 97 % --   11/04/21 1245 -- -- -- 102 -- 97 % --   11/04/21 1230 -- -- -- 102 -- 97 % --   11/04/21 1215 -- -- -- 101 -- 98 % --   11/04/21 1200 -- -- -- 102 20 97 % --   11/04/21 1158 103/59 -- -- 102 -- -- --   11/04/21 1145 -- 98.4  F (36.9  C) -- 95 -- 99 % --   11/04/21 1130 -- 98.8  F (37.1  C) -- 85 -- 98 % --   11/04/21 1115 104/75 98.8  F (37.1  C) -- 82 -- 98 % --     Ranges for vital signs over the past 24 hours:    Temp:  [97.8  F (36.6  C)-98.8  F (37.1  C)] 98.5  F (36.9  C)  Pulse:  [] 67  Resp:  [16-36] 16  BP: (103-147)/(51-90) 133/67  MAP:  [63 mmHg-148 mmHg] 99 mmHg  Arterial Line BP: ()/() 125/86  FiO2 (%):  [40 %] 40 %  SpO2:  [92 %-100 %] 98 %  Vitals:    11/03/21 0400 11/04/21 0400 11/05/21 0000   Weight: 72.2 kg (159 lb 2.8 oz) 71.3 kg (157 lb 3 oz) 71.4 kg (157 lb 6.5 oz)     Intake/Output Summary (Last 24 hours) at 11/5/2021 1113  Last data filed at 11/5/2021 1000  Gross per 24 hour   Intake 1668.35  ml   Output 3540 ml   Net -1871.65 ml     Physical Examination:  GENERAL:  Somnolent but awakable, moving toes and head to request, HESHAMWJUNIOR, 56 year old man in NAD on the ventilator via tracheostomy.  HEAD:  NCAT.  EYES:  PERRL, anicteric sclerae.  ENT:  No otorrhea.  No anterior oral lesion.  NECK:  Supple, tracheostomy site lacks inflammation.  LYMPH:  No lymphadenopathy.  LUNGS:  Anterior clear to auscultation bilaterally.  Left chest tube present.  CARDIOVASCULAR:  RRR, without murmur.  ABDOMEN: Bowel sounds present, soft, nontender by monitor to palpation, GJ tube with blood but site lacks inflammation.  Rectal tube present.  :  Watson (placed 10/25/21) with leena urine.  EXTREMITIES:  Distally warm, no edema.  SKIN:  No acute rash or lesion. Left PICC (placed 11/4/21) line site lacks inflammation.  NEUROLOGIC:  Non-communicative but wiggles toes inconsistently to request.         Laboratory Data:     No results found for: ACD4    Inflammatory Markers    Recent Labs   Lab Test 10/29/21  0542 10/14/21  0943 09/07/21  1324 09/06/21  0633   SED 87*  --   --   --    CRP 53.0* 23.0*   < >  --    PSA  --   --   --  0.56    < > = values in this interval not displayed.     Immune Globulin Studies     Recent Labs   Lab Test 10/15/21  0907 09/07/21  1324 09/07/21  1100   * 363*  363*  --    IGM  --  51  --    IGE  --   --  83   IGA  --  103  --      Metabolic Studies       Recent Labs   Lab Test 11/05/21  0657 11/05/21  0508 11/05/21  0337 11/04/21  1019 11/04/21  1016 11/04/21  1015 11/04/21  0351 11/04/21  0339 10/29/21  1106 10/29/21  1046 10/22/21  1602 10/22/21  1601 10/22/21  0959 10/22/21  0958 10/20/21  1004 10/20/21  0957 09/07/21  1100 09/07/21  0928   0000   NA  --   --  151*  --   --  151*   < > 148*  148*   < >  --    < > 147*   < > 147*   < > 146*   < >  --   --    POTASSIUM  --   --  4.1  --   --  3.5   < > 4.0  4.0   < >  --    < > 3.6   < > 3.5   < > 4.1   < >  --   --    CHLORIDE  --   --  123*   --   --  121*   < > 121*  121*   < >  --    < > 109   < > 106   < > 107   < >  --   --    CO2  --   --  24  --   --  26   < > 23  23   < >  --    < > 31   < > 34*   < > 34*   < >  --   --    ANIONGAP  --   --  4   < >  --  4   < > 4  4   < >  --    < > 7   < > 7   < > 5   < >  --   --    BUN  --   --  60*   < >  --  80*   < > 86*  86*   < >  --    < > 68*   < > 62*   < > 54*   < >  --   --    CR  --   --  0.71  --   --  0.88   < > 0.88  0.88   < >  --    < > 1.47*   < > 1.54*   < > 1.60*   < >  --   --    GFRESTIMATED  --   --  >90   < >  --  >90   < > >90  >90   < >  --    < > 53*   < > 50*   < > 47*   < >  --   --    *   < > 130*   < >  --  169*   < > 138*  138*   < >  --    < > 148*   < > 92   < > 45*   < >  --   --    A1C  --   --   --   --   --   --   --   --   --   --   --   --   --   --   --   --   --  6.6*  --    ERIK  --   --  8.2*  --   --  8.7   < > 8.7  8.7   < >  --    < > 8.2*   < > 7.9*   < > 8.0*   < >  --   --    PHOS  --   --  3.0  --   --   --   --  3.7   < >  --    < > 2.8  --   --    < >  --    < >  --    < >   MAG  --   --  1.9  --   --   --   --  2.0   < >  --    < > 2.1  --   --    < >  --    < >  --    < >   LACT  --   --   --   --  0.8  --   --  1.4   < >  --    < >  --    < >  --    < >  --    < >  --   --    PCAL  --   --   --   --   --   --   --   --   --  0.38*  --   --   --   --   --   --    < >  --   --    FGTL  --   --   --   --   --   --   --   --   --   --   --   --   --   --   --  399  --   --   --    CKT  --   --   --   --   --   --   --   --   --   --   --  51  --  55  --   --    < >  --   --     < > = values in this interval not displayed.     Hepatic Studies    Recent Labs   Lab Test 11/05/21  0337 11/04/21  0339 11/04/21  0339 11/03/21  0407 11/03/21  0032 10/27/21  0523 10/26/21  0637 10/25/21  1609 10/25/21  1141 09/30/21  1059 09/19/21  1629   BILITOTAL 0.5  --  0.4   < >  --    < > 0.3   < >  --    < >  --    DBIL  --   --   --   --   --   --  0.1   < >  --     < >  --    ALKPHOS 130   < > 129   < >  --    < > 160*   < >  --    < >  --    PROTTOTAL 4.9*   < > 4.8*   < >  --    < > 5.8*   < >  --    < >  --    ALBUMIN 1.8*   < > 1.7*   < >  --    < > 1.8*   < >  --    < >  --    AST 59*   < > 117*   < >  --    < > 21   < >  --    < >  --    *   < > 262*   < >  --    < > 71*   < >  --    < >  --    LDH  --   --   --   --   --   --   --   --   --   --  423*   DOREEN  --   --   --   --  25  --   --   --  29   < >  --     < > = values in this interval not displayed.     Pancreatitis testing    Recent Labs   Lab Test 10/22/21  0407 09/07/21  1324 09/07/21  1100   AMYLASE  --   --  32   TRIG 307*   < >  --     < > = values in this interval not displayed.     Hematology Studies   Recent Labs   Lab Test 11/05/21  0337 11/04/21  1816 11/04/21  0339 11/04/21  0339 11/03/21  1652 11/03/21  1652 11/02/21  1302 11/02/21  1053 11/02/21  0832 11/02/21  0815 11/01/21  0818 10/31/21  0610 10/30/21  0944 10/30/21  0503   WBC 18.5* 21.4*  --  25.1*  --  26.9*   < > 57.4*  57.4*   < > 37.5*   < > 26.6*   < > 36.1*   ANEU  --   --   --   --   --   --   --  49.9*  --  28.5*   < >  --   --   --    ANEUTAUTO  --   --   --   --   --   --   --   --   --   --   --  23.6*  --  32.6*   ALYM  --   --   --   --   --   --   --  4.0   < > 4.9   < >  --   --   --    ALYMPAUTO  --   --   --   --   --   --   --   --   --   --   --  0.9   < > 1.1   DONALD  --   --   --   --   --   --   --  1.7*   < > 2.3*   < >  --   --   --    AMONOAUTO  --   --   --   --   --   --   --   --   --   --   --  1.3   < > 1.3   AEOS  --   --   --   --   --   --   --  0.0   < > 0.0   < >  --   --   --    AEOSAUTO  --   --   --   --   --   --   --   --   --   --   --  0.0   < > 0.0   ABSBASO  --   --   --   --   --   --   --   --   --   --   --  0.1   < > 0.1   HGB 8.9* 9.1*   < > 9.1*   < > 9.2*   < > 8.8*   < > 5.8*   < > 8.2*   < > 8.6*   HCT 28.3* 29.1*   < > 29.0*   < > 28.1*   < > 26.8*   < > 19.6*   < > 26.9*   < >  28.0*    182   < > 173   < > 156   < > 244   < > 382   < > 196   < > 207    < > = values in this interval not displayed.     Clotting Studies    Recent Labs   Lab Test 11/05/21  0337 11/04/21  0339 11/03/21  0407 11/02/21  1053   INR 1.27* 1.32* 1.36* 1.55*   PTT  --   --   --  34     Iron Testing    Recent Labs   Lab Test 11/05/21 0337 11/01/21  0818 10/31/21  0610 10/23/21  0344 10/22/21  0959 09/19/21  1629 09/19/21  1304 09/10/21  0616 09/09/21  0536   IRON  --   --  35  --   --   --   --   --  146   FEB  --   --  309  --   --   --   --   --  190*   IRONSAT  --   --  11*  --   --   --   --   --  77*   ARMEN  --   --   --   --   --   --   --   --  1,049*   MCV 95   < > 101*   < >  --    < > 90   < > 87   HAPT  --   --   --   --  380*   < >  --   --   --    RETP  --   --   --   --   --   --  5.2*  --   --    RETICABSCT  --   --   --   --   --   --  0.177*  --   --     < > = values in this interval not displayed.     Autoimmune Testing    Recent Labs   Lab Test 09/07/21  1324   RNPIGG Negative   SMIGG Negative   SSAIGG Negative   SSBIGG Negative     Arterial Blood Gas Testing    Recent Labs   Lab Test 11/05/21  0504 11/05/21  0336 11/04/21  1016 11/04/21  0550 11/03/21  2204 11/03/21  2204   PH 7.49* 7.49* 7.46* 7.47*  --  7.46*   PCO2 31* 32* 35 34*  --  36   PO2 126* 331* 95 100  --  108*   HCO3 24 24 25 25  --  26   O2PER 40 40 40 40   < > 50    < > = values in this interval not displayed.     Thyroid Studies     Recent Labs   Lab Test 10/29/21  1046 10/29/21  0542 09/07/21  1100 09/06/21  0633 09/06/21  0633   TSH  --  7.90* 0.11*  --  0.19*   T4 1.04  --  0.68*   < > 0.73*    < > = values in this interval not displayed.     Urine Studies     Recent Labs   Lab Test 11/01/21  1336 10/25/21  1507 10/22/21  1641 10/17/21  1642 10/17/21  1041   URINEPH 5.5 5.5 7.5* 5.0 5.0   NITRITE Negative Negative Negative Negative Negative   LEUKEST Negative Negative Negative Negative Trace*   WBCU 0 2 3 25* 44*      Medication levels    Recent Labs   Lab Test 11/05/21  0559 10/18/21  0549 10/18/21  0403   VANCOMYCIN  --   --  12.6   TACROL 5.5   < >  --     < > = values in this interval not displayed.     Microbiology:    Fungal testing  Recent Labs   Lab Test 10/20/21  0957   FGTL 399   FGTLI Positive*     Last Culture results with specimen source  Culture   Date Value Ref Range Status   11/03/2021 No anaerobic organisms isolated after 1 day  Preliminary   11/03/2021 Culture in progress  Preliminary   11/03/2021 1+ Yeast (A)  Preliminary   11/03/2021 No growth after 1 day  Preliminary   11/02/2021 No growth after 2 days  Preliminary   11/02/2021 No growth after 2 days  Preliminary   11/02/2021 2+ Staphylococcus epidermidis (A)  Final     Comment:     Susceptibilities not routinely done   11/02/2021 2+ Staphylococcus epidermidis (A)  Final     Comment:     Susceptibilities not routinely done   10/30/2021 2+ Normal jean marie  Final   10/30/2021 Candida albicans (A)  Preliminary   10/29/2021 No anaerobic organisms isolated after 6 days  Preliminary   10/29/2021 No growth after 6 days  Preliminary   10/29/2021 No Growth  Final   10/26/2021 2+ Normal jean marie  Final   10/26/2021 Candida albicans (A)  Preliminary   10/25/2021 1+ Normal jean marie  Final    No results found for: SDES     Last check of C difficile  C Difficile Toxin B by PCR   Date Value Ref Range Status   10/27/2021 Negative Negative Final     Comment:     A negative result does not exclude actual disease due to C. difficile and may be due to improper collection, handling and storage of the specimen or the number of organisms in the specimen is below the detection limit of the assay.       Quantiferon testing   Recent Labs   Lab Test 11/02/21  1053 11/02/21  0815 09/16/21  0645 09/07/21  1324   TBRES  --   --   --  Indeterminate*   LYMPH 7 13   < > 2    < > = values in this interval not displayed.     Virology:    Coronavirus-19 testing    Recent Labs   Lab Test  11/01/21  1141 10/24/21  1644 10/17/21  1329 10/15/21  0614 09/15/21  1216 09/07/21  1324 09/06/21  0010 08/30/21  0752 08/02/21  1230 06/20/20  1454   CD19  --   --   --   --   --  <1*  --   --   --   --    ACD19  --   --   --   --   --  1*  --   --   --   --    [id number] Negative Negative Negative Negative   < >  --    < >  --   --   --    COVIDPCREXT  --   --   --   --   --   --   --  Not Detected Not Detected Not Detected    < > = values in this interval not displayed.     Respiratory virus (non-coronavirus-19) testing    Recent Labs   Lab Test 10/17/21  1331   IFLUA Negative   FLUAH1 Negative   [id number] Negative   FLUAH3 Negative   IFLUB Negative   PIV1 Negative   PIV2 Negative   PIV3 Negative   HRVS Negative   RSVA Negative   RSVB Negative   HMPV Negative   ADVBE Negative   ADVC Negative     CMV viral loads    Recent Labs   Lab Test 10/26/21  1540   CMVQNT Not Detected       Hepatitis B Testing     Recent Labs   Lab Test 10/15/21  0907 09/16/21  2157 09/07/21  1324 09/07/21  1100   AUSAB 0.12  --   --  0.51   HBCAB Nonreactive  --   --  Nonreactive   HEPBANG Nonreactive Nonreactive   < >  --     < > = values in this interval not displayed.     Hepatitis C Antibody   Date Value Ref Range Status   10/15/2021 Nonreactive Nonreactive Final   09/08/2021 Nonreactive Nonreactive Final       CMV Antibody IgG   Date Value Ref Range Status   10/15/2021 No detectable antibody. No detectable antibody.  Final   09/07/2021 No detectable antibody. No detectable antibody.  Final     Varicella Zoster Antibody IgG   Date Value Ref Range Status   09/07/2021 Positive (A) No detectable antibody.  Final     Comment:     Suggests previous exposure or immunization and probable immunity.     EBV Capsid Antibody IgG   Date Value Ref Range Status   10/15/2021 Positive (A) No detectable antibody. Final     Comment:     Suggests recent or past exposure.   09/07/2021 Positive (A) No detectable antibody. Final     Comment:     Suggests  recent or past exposure.     Toxoplasma Antibody IgG   Date Value Ref Range Status   09/07/2021 <3.0 0.0 - 7.1 IU/mL Final     Comment:     Negative- Absence of detectable Toxoplasma gondii IgG antibodies. A negative result does not rule out acute infection.     Herpes Simplex Virus Type 1 IgG Antibody   Date Value Ref Range Status   10/15/2021 Positive.  IgG antibody to HSV-1 detected. (A) No HSV-1 IgG antibodies detected Final   09/07/2021 Positive.  IgG antibody to HSV-1 detected. (A) No HSV-1 IgG antibodies detected Final     Herpes Simplex Virus Type 2 IgG Antibody   Date Value Ref Range Status   10/15/2021 Positive.  IgG antibody to HSV-2 detected. (A) No HSV-2 IgG antibodies detected Final   09/07/2021 Positive.  IgG antibody to HSV-2 detected. (A) No HSV-2 IgG antibodies detected Final     Imaging:  Recent Results (from the past 48 hour(s))   IR Chest Tube Place Non Tunneled Left    Narrative    Shayna BACA [employer]   [id number]      [id number]    SHAYNA BACA [employer]  [id number]  1965;  56 years    IR CHEST TUBE PLACEMENT NON-TUNNELLED LEFT  bilateral lung transplant, left empyema    -----    PRE-OPERATIVE DIAGNOSIS:  Pleural effusion    POST-OPERATIVE DIAGNOSIS:  Same    PROCEDURE:  Image guided chest tube placement      Impression    IMPRESSION:  Completed ultrasound guided bedside placement of  LEFT-sided chest tube. ?A 14 Romanian non-locking drain placed and  connected to a closed-system drainage container under water seal. ?A  sample of 120 mL dark red colored fluid was collected. ?No immediate  complication. ?Dx: ?Pleural effusion. ?Mariam.     Evaluation for ultrasound guided placement of RIGHT-sided chest tube.  ?Bedside ultrasound shows very little effusion. ?No attempt at chest  tube placement made. ?Dx: ?Pleural effusion. ?Mariam.     -----    CLINICAL HISTORY:  Pleural effusion; chest tube placement requested    PERFORMED BY:  MP Chau PA-C (Interventional  Radiology)    CONSENT:   "The patient understood the limitations, alternatives, and  risks of the procedure and agreed to the procedure.  Written informed  consent was obtained and is documented in the patient record.    MEDICATIONS:  1% lidocaine was used for local anesthesia    NURSING:  The patient was placed on continuous vital signs monitoring.   Vital signs were monitored by nursing staff under my supervision.    DESCRIPTION:  Ultrasound evaluation revealed a fluid collection in the  LEFT pleural space.  The skin overlying the fluid collection was  prepped and draped in the usual sterile fashion and anesthetized.    A small skin nick was made with a #11 scalpel blade.  Under continuous  ultrasound visualization, a 5 [place], centesis needle/catheter system  was advanced into the fluid collection on a slip tip syringe.  Once  fluid was aspirated, the catheter was advanced off the needle into the  pleural space and the needle was removed.  A 0.035 guide wire was  advanced through the catheter.  Over-the-wire dilation was achieved  with fascial dilators.  A non-locking drain was then placed  over-the-wire and wire removed.  Valved closure of drain was made.   The chest tube was secured to the skin with a retention suture and a  sterile bandage was applied.  Drain valve removed.  The chest tube was  connected to closed-system drainage container under water seal for  transport to unit.  Ultrasound images were stored in the patient  record.    Evaluation for ultrasound guided placement of RIGHT-sided chest tube.  ?Bedside ultrasound shows very little effusion. ?No attempt at chest  tube placement made. ?    COMPLICATIONS:  No immediate complication;  the patient remained  stable throughout the procedure    ESTIMATED BLOOD LOSS:  Minimal    SPECIMENS:  Per above    -----  \"The physician assistant (PA) who performed this procedure and signed  the above report is licensed to practice in the [facility] [place]  pursuant to [place] Statute 147A.09.  " "This includes meeting the Statute and  [place] Board of Medical Practice requirement of a collaborating  physician.\"  -----    SABINE ANDERSON PA-C         SYSTEM ID:  [id number]   US Upper Extremity Venous Duplex Bilat   Result Value    Radiologist flags DVT (Urgent)    Narrative    EXAMINATION: DOPPLER VENOUS ULTRASOUND OF BILATERAL UPPER EXTREMTIES,  11/4/2021 10:59 AM     INDICATION: Evaluate for internal jugular clots and upper bilateral  extremity thrombus.    COMPARISON: Venous approximately ultrasound 10/19/2021    TECHNIQUE:  Gray-scale evaluation with compression, spectral flow, and  color Doppler assessment of the deep venous system of both upper  extremities.    FINDINGS:  There is nonocclusive thrombus noted in the right mid to medial  subclavian vein at the previous area of central line    Normal blood flow and waveforms are demonstrated in bilateral internal  jugular, innominate, axillary and left subclavian veins. There is  normal compressibility of the brachial, basilic and cephalic veins  bilaterally.      Impression    IMPRESSION:  Nonocclusive thrombus in the right mid to medial subclavian vein.    [Urgent Result: DVT]    Finding was identified on 11/4/2021 11:05 AM.     Dr. Pritchett was contacted by Dr. Ansari at 11/4/2021 11:09 AM and  verbalized understanding of the urgent finding.     I have personally reviewed the examination and initial interpretation  and I agree with the findings.    ARIES DELGADO MD         SYSTEM ID:  [id number]   XR Abdomen Port 1 View    Narrative    EXAMINATION:  XR ABDOMEN PORT 1 VIEWS 11/4/2021 8:03 PM.    COMPARISON: 11/2/2021.    HISTORY:  Ileus    FINDINGS: Portable AP view of the abdomen and pelvis. Partly  visualized surgical changes of the chest. Left basilar chest tube.  Percutaneous gastrojejunostomy tube. Watson catheter noted.  Nonobstructive bowel gas pattern. No dilated loops of bowel. No  pneumatosis. Evaluation of free air is limited on this " supine exam.  Bibasilar atelectasis.      Impression    IMPRESSION:   Nonobstructive bowel gas pattern.    I have personally reviewed the examination and initial interpretation  and I agree with the findings.    ARIES DELGADO MD         SYSTEM ID:  [id number]   XR Chest Port 1 View    Narrative    Exam: XR CHEST PORT 1 VIEW, 11/5/2021 12:43 AM    Indication: s/p lung transplant    Comparison: 11/4/2021    Findings:   Tracheostomy distal tip projects over the high thoracic trachea. There  is a left-sided chest tube in place. Postsurgical changes of lung  transplant similar to prior. Left PICC distal tip projects over the  low superior vena cava. Cardiac mediastinal silhouette is unchanged.  Small bilateral pleural effusions with hazy bibasilar atelectasis  similar to prior. No pneumothorax. No acute osseous abnormalities.  Partially visualized percutaneous gastrostomy tube in the left upper  quadrant.      Impression    Impression: Stable exam. Small bilateral pleural effusions with hazy  bibasilar atelectasis.    I have personally reviewed the examination and initial interpretation  and I agree with the findings.    KAMLA SHIN MD         SYSTEM ID:  [id number]   CT Head w/o Contrast    Narrative    CT HEAD W/O CONTRAST 11/5/2021 9:09 AM    History: Mental status change, unknown cause     Comparison: 11/2/2021    Technique: Using multidetector thin collimation helical acquisition  technique, axial, coronal and sagittal CT images from the skull base  to the vertex were obtained without intravenous contrast.    Findings: There is no intracranial hemorrhage, mass effect, or midline  shift. Hypodense evolving subacute infarcts in the left occipital  lobe, right occipital lobe, bilateral parietal lobes are not  significantly changed. Bilateral serpiginous occipital parietal  cerebral hyperdensities, likely representing cortical laminar  necrosis. No new areas of infarction. No extra-axial fluid  collections.  Proportionate size of the ventricles and sulci.  Gray-white matter differentiation is otherwise maintained. Basal  cisterns are clear. Normal noncontrast appearance of the major  cerebral vasculature. Slightly edematous. No bilateral mastoid air  cells. Paranasal sinuses are otherwise clear..     The bony calvaria and the bones of the skull base are normal.      Impression    Impression:  1. 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  2. No new acute intracranial findings.         I have personally reviewed the examination and initial interpretation  and I agree with the findings.    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  No new acute intracranial findings.  11/5, 11/4 CXRs:  Stable exam. Small bilateral pleural effusions with hazy bibasilar atelectasis.  11/4 ABX:  Nonobstructive bowel gas pattern.  11/4 LUE U/S:  Nonocclusive thrombus in the right mid to medial subclavian vein at the previous area of central line.   11/3 Left chest tube placed by Interventional Radiology.  11/3, 11/2 CXRs:  Small bilateral pleural effusions with slightly increased or unchanged hazy bibasilar airspace opacities.  11/2 Head CT:  No new acute intracranial abnormalities. Scattered subacute multifocal infarctions in both cerebral hemispheres, in similar distribution to prior MRI. No CT evidence for new focal cortical hypoattenuations to suggest acute infarction or new areas of anoxic brain damage. No intracranial hemorrhage.  11/2 Chest / abdm CT:   1.  The stomach is distended with hyperdense fluid suggestive of blood products. No contrast extravasation in the gastrointestinal tract to indicate ongoing hemorrhage.  2.  Increased bilateral pleural effusions compared to CT 10/25/2021, moderate on the left, small on the right.  Bibasilar atelectasis with an area of hypoenhancing parenchyma in the left lower lobe, suspicious for infection.  3.  Dilated small bowel without definite transition point suggestive of ileus.  4.  Decreased nonocclusive thrombus in the right internal jugular vein.  5.  Numerous nondisplaced anterior rib fractures bilaterally. Small fluid collections deep to the pectoralis major muscles bilaterally, increased/new compared to the previous CT and likely representing small seromas/hematomas.   11/1, 10/31, 10/30 CXRs:  Slightly increased hazy retrocardiac airspace opacity.  Hazy right basilar airspace opacities are unchanged, likely atelectasis.  Mild pulmonary congestion.  Small bilateral pleural effusions.  Stable postsurgical chest.  Tracheostomy tube now demonstrated. Gastric and feeding tubes removed.   10/28, 10/27 CXRs:  Removal of bilateral chest tubes without pneumothorax.  Small left and trace right pleural effusions and postoperative atelectasis.  Mild pulmonary vascular congestion.   10/26 Brain MRI:  1. Evolving acute age multifocal acute infarctions in both cerebral hemispheres and left cerebellum. No new areas of infarction when compared with prior MRI brain on 10/23/2021.  2. Minimally increased punctate regions of susceptibility effect within areas of infarction, corresponding to petechial hemorrhage within previously identified multifocal acute infarctions.  3. Head MRA demonstrates no definite aneurysm or stenosis of the major intracranial arteries.  4. Neck MRA demonstrates patent major cervical arteries.   10/26 CXR:  Improved left pleural effusion and associated lower lobe opacity.  Stable small right pleural effusion.  No new focal airspace opacities.  Stable support devices.  10/26 Head CT:  This exam is significantly limited by motion and streak artifact.  MRI brain 10/23/2021 demonstrated multiple acute/subacute infarcts throughout the bilateral cerebral hemispheres and left cerebellum. Some  of these acute/subacute infarcts demonstrated microhemorrhage on SWI.  Left posterior parietal lobe acute/subacute infarct is identified on current exam. Remaining previously seen infarcts are not identified possibly obscured by artifact. It is possible that a component of the abnormal signal on MRI may of been attributed to posterior reversible encephalopathy syndrome in addition to acute/subacute infarcts.  No CT evidence for a new cortical hypoattenuation to indicate a new acute infarct on current exam.  No definite acute intracranial hemorrhage given artifact.  No midline shift.  10/25 ABX:  Uncomplicated feeding tube placement with tip in the third portion of the duodenum.  10/25 Head / neck CTA:  HEAD CTA:  1.  No intracranial arterial large vessel occlusion.  2.  No significant stenosis/occlusion.  NECK CTA:  1.  Right vertebral artery mild stenosis.  2.  Left vertebral artery origin severe high-grade stenosis.  3.  Otherwise, no significant stenosis/occlusion. No dissection.  10/25 PM CXR:  1. Interval removal of apically directed chest tubes. No appreciable pneumothorax.  2. Cardiomegaly with persistent bilateral interstitial opacities suggestive for pulmonary edema versus atelectasis.  3. Small left pleural effusion with increased left basilar atelectasis versus consolidation.  4. Additional lines and tubes in stable position.   10/25 Chest / abdm CT:  1. Decreased (versus 10/22/21 CT scan) consolidative opacities in the posterolateral left upper lobe and bilateral lung bases, which may represent improving infection and/or atelectasis.  2. Nonocclusive venous thrombosis of the low right internal jugular vein.  3. Decreased trace right hydropneumothorax and small left pleural effusion.  4. Support devices are in similar position, including the right inferior chest tube tip near the 8th-9th intercostal space, not definitively in the pleural space.  5. Decreased anterior chest subcutaneous/intramuscular  emphysema.  6. The remainder of the exam is not significantly changed since 10/22/2021.  10/25 CXR:  1. No significant interval change and stable support devices.  2. Small left pleural effusion. Bibasilar atelectasis. Stable bilateral interstitial and airspace opacities.  3. No pneumothorax.  10/24 CXR:  1.  Stable postsurgical bilateral lung transplant changes with mild  perihilar interstitial opacities suggestive for pulmonary edema versus atelectasis.  2.  Support devices in stable position. No appreciable pneumothorax.   10/23 TTE:  No evidence of endocarditis. Normal biventricular function.  No valvular disease.  10/23 Brain MRI:  Multifocal subacute infarcts within both cerebral hemispheres and left cerebellum all of which appear present on prior head CT 10/22/2021.  Minimal petechial hemorrhage associated with the infarct in the left occipital lobe.  10/23 ABXs:  Gastric tube tip and sidehole projected over the stomach.  Nonobstructive bowel gas pattern.  10/23 TTE:  Valves cannot be assessed in this echo due to bandages on the chest.   10/23 CXR:  1. Postsurgical changes bilateral lung transplant.  2. Stable support devices.  3. Small left pleural effusion. Bibasilar atelectasis. Stable bilateral interstitial and airspace opacities.  4. No appreciable pneumothorax.  10/22 Chest / abdm CT angiogram:  1. No acute pulmonary embolus.  2. Surgical changes of bilateral lung transplantation are stable compared to exam performed earlier today. Similar appearance of opacities throughout both lungs, likely representing atelectasis and/or consolidation.  3. No acute abnormality in the abdomen or pelvis.     10/22 Chest CT:  1. Postoperative changes of bilateral lung transplantation with supportive devices in stable positioning.  2. Multifocal consolidative opacities involving the left upper, left lower, and right lower lobes with scattered mucous plugging.  Concerning for aspiration or infection.  3. Multifocal  diffuse groundglass opacities with mild interlobular septal thickening, compatible with pulmonary edema.  4. Extensive subcutaneous emphysema of the anterior chest wall and right neck.

## 2021-11-05 NOTE — PROCEDURES
"[facility] Brief Operative Note    Procedure:  Upper endoscopy   Post-operative diagnosis:  \"Bumper\" ulcer   Staff Physician:  Dr. Ronnell Hernandez   Fellow/Assistant(s):  NA    Specimens:  None   Findings:  Ulcer noted at PEG-bumper site. Otherwise gastritis noted, some suction marks from G-tube. G-J tube is coiled in the stomach.    Complications:  None.   Condition:  Stable   Recommendations  Diet:  Return to previous diet  PPI:  PPI po BID  Anti-coagulants/platelets:  Restart anticoagulation now  Octreotide:  N/A  Discharge Planning:   Per primary team.             "

## 2021-11-05 NOTE — PROGRESS NOTES
Care Management Follow Up    Length of Stay (days): 61    Expected Discharge Date:  TBD     Concerns to be Addressed: Care conference      Patient plan of care discussed at interdisciplinary rounds: Yes    Anticipated Discharge Disposition:  TBD     Anticipated Discharge Services: TBD   Anticipated Discharge DME: TBD     Additional Information:  Pt remain in ICU vented via trach.  Pt is s/p Lung transplant on 10/16/21 and trach and PEG on 10/29.  RNCC received a message from  that pt dtr would like to have care conf. arrange for update.      RNCC contacted pt dtr, Zenia # [phone] and discussed about the care conf.  Zenia sated pt spouse and her were able to get update from the team when they call but pt dad have not gotten any update and they would like to have a phone conf. with the team for the dad to get update.  Pt dad lives in [place].  RNCC informed Zenia that on Monday the team changes and it might be hard to have care conf. On Monday day and asked her if Tuesday works for them.  Zenia sated Tuesday afternoon will be good for them too.  RNCC agreed to check with the new team on Monday, 11/8 and call her back with the time and date of the care conf.  Zenia agreed with the plan.  RNCC will cont to follow plan of care.      Jennifer Pate RN, PHN, BSN  4A and 4E/ ICU  Care Coordinator  Phone: [phone]  Pager: [phone]    To get in touch with weekend & Holiday on call RN Care Coordinator  Page [phone] or Care Coordinator Job code/pager- [phone]

## 2021-11-05 NOTE — PROGRESS NOTES
Pulmonary Medicine  Cystic Fibrosis - Lung Transplant Team  Progress Note  2021       Patient: Edson Thornton  MRN: [id number]  : 1965 (age 56 year old)  Transplant: 10/16/2021 (Lung), POD#20  Admission date: 2021    Assessment & Plan:     Edson Thornton is a 56 year old male with a PMH significant for NSIP/ILD, bronchiectasis, moderate PH, RA, SALTY, chronic HSV infection, hypogammaglobulinemia, steroid-induced diabetes, hypothyroidism, PFO, HTN, HLD, duodenal anomaly, anxiety, and depression.  Admitted on 21 from OSH for acute on chronic respiratory failure 2/2 ILD exacerbation, now s/p BSLT on 10/16/21. Surgery relatively uncomplicated but pt. with low cardiac index and PGD immediately post-op.  Caroline weaned off 10/22, remains intubated, s/p trach and PEG/J tube placement with thoracic surgery 10/29.  Post-op course complicated by encephalopathy and diffuse weakness, acute to subacute CVA, afib with RVR, BRENNAN, GI bleed due to NJ/OG trauma, Candidemia/Candida empyema, and recurrent fevers.  Neuro status remains tenuous, although some improvement noted since 10/29.  Code called  due to bradycardia/asystole (required 1 round of CPR, no medications) and then progressively hypotensive, noted to have acute drop in hemoglobin and bloody G tube output.  EGD with large amount of clotted blood in stomach and area of raised mucosa near PEG tube site that was clipped, no active bleeding. IV heparin restarted  (low intensity( given new RIJ nonocclusive thrombus.        Today's recommendations:   - PS trials BID as tolerated  - Diuresis per ICU team, agree with gentle diuresis today   - CXR daily with left chest tube in place  - Tacrolimus level subtherapeutic, dose increased, daily levels (ordered)  - Prednisolone taper due  (ordered)  - Resume Bactrim with improved Cr/potassium trend  - CMV and EBV ordered   - Continue acyclovir through POD #30  - Coccidioides  Ag ordered 11/17  - Follow pending trach sputum and blood cultures (11/2), pleural cultures (11/3).  Staph epi on sputum cultures, treatment not indicated per txpt ID  - Continue fluconazole at least through 11/8 with final duration TBD pending clinical course  - IgG ordered 11/17  - DSA ordered 11/15  - Donor risk labs ordered 11/15  - Repeat EGD today notes G-J tube is coiled in stomach, agree with J tube reposition to postpyloric by Thoracic team, recommend not advancing TF past trophic rate pending J tube reposition   - AC management per GI and ICU team, agree with low intensity IV heparin given new RIJ nonocclusive thrombus     S/p BSLT for ILD:  Acute hypoxic respiratory failure s/p trach:   Pulmonary edema:  Bilateral pleural effusions: Unfortunately had not received vaccination for flu, PNA, or COVID-19 PTA.  Explant pathology with NSIP, no malignancy.  PGD 2-3.  Weaned off paralytic 10/19 (for vent dyssynchrony) and Caroline 10/22.  Initial difficulty weaning sedation given agitation then with neurological findings as below.  CXR initially post-op with pulmonary edema, aggressively diuresed.  Recurring fevers post-op, see ID section below.  Most recent bronch 10/30 with thick secretions in the RUL and LLL and mild mucosal erythema, bilateral anastomoses intact.  Remains intubated with persistent encephalopathy as below, s/p trach and PEG/J tube placement with thoracic surgery 10/29.  Had been intermittent tolerating PS trials.  Code called 11/2 for bradycardia/asystole (required 1 round of CPR, no medications) then progressively hypotensive, GI bleed as below.  Chest CT 11/2 with increased bilateral pleural effusions (moderate left, small right), bibasilar atelectasis with area of hypoenhancing parenchyma in LLL (suspicious for infection), and numerous nondisplaced anterior rib fractures bilaterally.  S/p left chest tube placement in IR 11/3, right deferred given very little effusion on US.   - Nebs:  levalbuterol and Mucomyst QID  - Aggressive pulmonary toilet with chest physiotherapy QID  - Ventilator management per ICU team, PS trials BID as tolerated  - Volume management per nephrology and ICU team, agree with gentle diuresis today 11/5  - CXR daily with left chest tube in place, today with small bilateral pleural effusions and bibasilar atelectasis (personally reviewed with Dr. Sifuentes)  - Defer bronch for now, revisit prn     Immunosuppression: s/p induction therapy with basiliximab 10/16 (and high dose IV steroid) and 10/20  - Tacrolimus SL 0.5 mg qAM / 1 mg qPM (11/2, changed to SL given GI bleed).  Goal level 8-12.  Level 11/5 subtherapeutic at 5.5, dose increased to 1 mg BID, daily levels (ordered).  - MMF 1000 mg BID (11/2, decreased given GI bleed, AZA to be avoided given TPMT)  - Prednisolone 15 mg qAM / 12.5 mg qPM, next taper due 11/7 (ordered)  Date AM dose (mg) PM dose (mg)   10/31/21 15 12.5   11/7/21 12.5 12.5   11/21/21 12.5 10   12/5/21 10 10   1/2/22 10 7.5   1/30/22 7.5 7.5   2/27/22 7.5 5   3/27/22 5 5   4/24/22 5 2.5      Prophylaxis:   - Resume Bactrim for PJP ppx (held 11/2-11/5, due to Cr/potassium trend)  - Nystatin for oral candidiasis ppx, 6 month course  - See below for serologies and viral ppx:    Donor Recipient Intervention   CMV status Negative Negative None, CMV monthly (ordered 11/16)   EBV status Positive Positive None, EBV monthly (ordered 11/16)   HSV status N/A Positive Acyclovir POD #1-30   (recent infection history pre-txp)      ID: Concern for possible Strongyloides exposure pre-transplant s/p ivermectin x1 dose (9/17).  Donor and recipient cultures NGTD.  S/p IV ceftazidime/vancomycin for 48h per protocol and additional empiric ceftazidime 10/19-10/23 given recurrent fevers.  Bronch cultures 10/17 with Staph epi.  Cryptococcal Ag negative 10/23.    - Monthly Coccidioides Ag x12 months post-transplant per ID (urine and serum negative 10/17), due 11/17  (ordered)     Recurring fevers:  Fevers post-op, Tmax 101.7 POD #1.  Febrile with worsening leukocytosis again 10/25, generally persisting.  Trach sputum culture (10/25) with GPC, normal jean marie.  LP (10/29), xanthochromic with pleocytosis thought to be appropriate given RBC and WBCs, no ABX recommended per transplant ID and neurology.  Bronch culture (10/30) with GPC in clusters, normal jean marie.  S/p empiric meropenem 10/28-11/2.  - Trach sputum culture (11/2) with Staph epi, no indication to treat per txpt ID     Disseminated Candida: Noted on blood cultures 10/20 and 10/22.  BDG fungitell positive (399) on 10/20.  Respiratory cultures with persistent Candida albicans.  TC 10/23 without evidence of endocarditis.  Ophthalmology consult 10/24 with benign dilated fundoscopic exam.  Candida empyema (P-S) also noted 10/25, chest tubes inadvertently removed by CVTS 10/28, left chest tube replaced by IR 11/3 as above.  - Repeat blood cultures (10/23, 10/25, 11/2) NGTD   - Pleural cultures including cytology (11/3) with Candida   - Fluconazole (10/26, prior micafungin 10/22-10/27), at least through 11/8 but final duration TBD pending clinical course     HSV: Chronic intermittent active infection pre-transplant with recent HSV infection: crusted lesions throughout left side of jaw, s/p 10 day treatment course of ACV through 10/9.  HSV PCR blood negative 10/17.  - ACV ppx as above (started POD #1 instead of POD #8 given HSV history and location)     Hypogammaglobulinemia: IgG previously low at 364 (9/7).  Noted at 265 at time of transplant, s/p IVIG with premedication 10/21.    - Repeat IgG 11/17 (ordered)     Positive cross match: Note that he received two doses of rituximab in June, which is likely contributing to cross match result.  DSA most recently negative 11/1.  - DSA monitoring q2 weeks (11/15, ordered)     PHS risk criteria donor:  Additional labs required post-transplant (between 4-8 weeks post-op): Hepatitis B,  Hepatitis C, and HIV by VISHAL (ordered 11/15).     SALTY: Noted pre-transplant.  Home CPAP 6-12 cm H2O.  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  MRI (10/23) with multifocal subacute infarcts within both cerebral hemispheres and left cerebellum.  EEG without seizures 10/22, ammonia normal.  DDx include surgery v embolic v infectious (see above regarding ID work up).  Heparin drip started 10/23.   Repeat stroke code 10/25 with marked decrease in responsiveness with sedation wean.  Pupils not equal (3 mm R, 2 mm L with extropia) and gag reflex initially absent.  CTA head without obvious new pathology, MRI brain (with and without contrast) primarily revealing for infarct, low likelihood of PRES.  VEEG per neuro with severe diffuse encephalopathy.  Etiology of CVA likely 2/2 afib, PFO, or perioperative.  Some improvement noted since 10/29, repeat head CT 11/2 (following code) without new acute intracranial abnormalities.  - Heparin management per neurology, GI, and primary team, held 11/2 due to GI bleed, agree with resuming 11/5 with new RUE thrombus as below     Afib with RVR: Noted 10/18, started on amiodarone drip and transitioned to PO 10/21, dose decreased 10/29 (anticipate 4 week course).  Currently in SR.  Heparin drip as above.  Metoprolol resumed 11/3.     BRENNAN, Improving:   Hyperkalemia: Baseline creatinine 0.7.  BRENNAN noted POD #1, UO also downtrending.  Improved with aggressive diuresis, Cr worsened again (1.38 on 11/2) along with up trending BUN and hyperkalemia, im.  Nephrology consulted 11/1, thought to be prerenal/component of ATN.  - Holding Bactrim as above  - Management per nephrology and  primary team     Recurrent GI bleed: Hemoglobin dropped to 6.6 10/22, s/p 2 units pRBC.  OG tube with bloody output, EGD 10/23 noted NJ/OG tube trauma with scant oozing.  IV PPI BID.  Progressive hypotensive 11/2, hemoglobin 5.8 with bloody G tube output.  Heparin drip held, transitioned to PPI drip, and MTP activated.  EGD 11/2 with large amount of clotted blood in stomach and area of raised mucosa with small adherent clot near PEG tube site that was clipped, no active bleeding.  Abdominal CT 11/2 with stomach distended with hyperdense fluid suggestive of blood products and dilated small bowel.  Maroon stools 11/3, repeat EGD with extensive old blood in stomach, no active bleeding, small nodular area with prior clips clipped again. Repeat EGD (11/5) with ulcer noted at PEG-bumper site, gastritis, and suction marks from G tube, and G-J tube is coiled in stomach.   - IST adjusted to IV formulation as above  - Management per GI and primary team, agree with J tube reposition to postpyloric by Thoracic team, recommend trophic feeds pending placement     Elevated LFTs: Shock liver post-op.  Initial ALT//230 evening of surgery, then with marked increase to 1550/1246 POD #1.  Had improved although now with acute rise again likely in setting of shock, again improving.    Nonocclusive R subclavian thrombus: BUE US (11/4) is with Nonocclusive thrombus in the right mid to medial subclavian vein. RUE swelling noted.  - AC per primary team, agree with IV heparin at low intensity (11/5)     We appreciate the excellent care provided by the CVICU and CVTS teams.  Recommendations communicated via in person rounding and this note.  Will continue to follow along closely, please do not hesitate to call with any questions or concerns.     Patient seen and discussed with Dr. Sifuentes.    Betina Jeffrey, APRN, CNP   Inpatient Nurse Practitioner  Pulmonary CF/Transplant  Pager #[phone]       Subjective & Interval History:  "    Tolerated PS at 7/8 40% for 4 hours yesterday. Suctioning q2-3 h with white/tan secretions per nursing. Repeat EGD today with ulcer noted at PEG-bumper site, gastritis, and suction marks from G tube, and G-J tube is coiled in stomach, no bleeding. Stools remain dark via rectal tube, hemoglobin stable.  Left chest tube with 120 mL yesterday dark red blood. Remains intubated, CMV 12/400/8/40.  Received chest physiotherapy QID yesterday.  Net negative 2L with diuresis yesterday.  Intermittently following commands per nursing, tracks with eyes. Grimaces to painful stimuli in BLE, but does not withdraw or move hands on command.  Fever curve improving, Tmax 99.7 yesterday, afebrile today, WBC down trending. Hypernatremia worsening, increasing FWF today to 100ml q1h.     Review of Systems:     ROS as above otherwise significantly limited due to trach and encephalopathy    Physical Exam:     Vital signs:  Temp: 98.6  F (37  C) Temp src: Axillary BP: (!) 151/75 Pulse: 69   Resp: 20 SpO2: 99 % O2 Device: Mechanical Ventilator Oxygen Delivery: 15 LPM Height: 162.6 cm (5' 4\") Weight: 71.4 kg (157 lb 6.5 oz)  I/O:     Intake/Output Summary (Last 24 hours) at 11/5/2021 1300  Last data filed at 11/5/2021 1200  Gross per 24 hour   Intake 1942.15 ml   Output 3395 ml   Net -1452.85 ml     Constitutional: lying in bed, in no apparent distress.   HEENT: Eyes with pink conjunctivae, anicteric. PERRL. Oral mucosa moist without obvious lesions (limited visualization given patient participation).  Trach cdi.  PULM: Diminished air flow to bases bilaterally.  No crackles, no rhonchi, no wheezes.  Non-labored breathing on ventilator. left chest tube, no air leak  CV: Normal S1 and S2.  RRR.  No murmur, gallop, or rub.  No peripheral edema.   ABD: NABS, soft, nondistended.    MSK: Moves BL extremities.  No apparent muscle wasting.   NEURO: Opens eyes to voice, not following commands.  SKIN: Warm, dry.  No rash on limited exam.   PSYCH: " Mood stable.     Lines, Drains, and Devices:  Peripheral IV 10/25/21 Right;Anterior;Lateral Lower forearm (Active)   Site Assessment WDL 11/05/21 1200   Line Status Infusing;Checked every 1 hour 11/05/21 1200   Dressing Intervention Dressing reinforced 11/05/21 0400   Phlebitis Scale 0-->no symptoms 11/05/21 1200   Infiltration Scale 0 11/05/21 1200   Infiltration Site Treatment Method  None 11/05/21 0400   If infiltrated, was a vesicant infusing? No 10/30/21 0400   Number of days: 11       Peripheral IV 10/25/21 Anterior;Distal;Right Upper arm (Active)   Site Assessment WD 11/05/21 1200   Line Status Infusing;Checked every 1 hour 11/05/21 1200   Dressing Intervention Dressing reinforced 11/05/21 0400   Phlebitis Scale 0-->no symptoms 11/05/21 1200   Infiltration Scale 0 11/05/21 1200   Infiltration Site Treatment Method  None 11/05/21 0400   If infiltrated, was a vesicant infusing? No 10/30/21 0400   Number of days: 11       Peripheral IV 11/02/21 Anterior;Distal;Right Lower forearm (Active)   Site Assessment WD 11/05/21 1200   Line Status Saline locked 11/05/21 1200   Phlebitis Scale 0-->no symptoms 11/05/21 1200   Infiltration Scale 0 11/05/21 1200   Infiltration Site Treatment Method  None 11/05/21 0400   Number of days: 3       Peripheral IV 11/02/21 Anterior;Right Upper forearm (Active)   Site Assessment WD 11/05/21 1200   Line Status Saline locked 11/05/21 1200   Phlebitis Scale 0-->no symptoms 11/05/21 1200   Infiltration Scale 0 11/05/21 0400   Infiltration Site Treatment Method  None 11/05/21 0400   Number of days: 3       PICC Triple Lumen 11/04/21 Left Basilic Access. PICC okay to use. (Active)   Site Assessment WDL 11/05/21 1200   External Cath Length (cm) 1 cm 11/04/21 1747   Extremity Circumference (cm) 28 cm 11/04/21 1747   Dressing Intervention Transparent;Chlorhexidine patch 11/05/21 1200   Dressing Change Due 11/08/21 11/05/21 0800   Mosley - Status saline locked 11/05/21 1200   Mosley - Cap Change  Due 11/09/21 11/05/21 0800   Red - Status saline locked 11/05/21 1200   Red - Cap Change Due 11/09/21 11/05/21 0800   White - Status infusing 11/05/21 1200   White - Cap Change Due 11/09/21 11/05/21 0800   Extravasation? No 11/05/21 1200   Line Necessity Yes, meets criteria 11/05/21 1200   Number of days: 1       Arterial Line 11/02/21 Radial (Active)   Site Assessment WDL 11/05/21 1200   Line Status Positional 11/05/21 1200   Arterine Line Cap Change Due 11/09/21 11/05/21 0800   Art Line Waveform Appropriate 11/05/21 1200   Art Line Interventions Leveled;Connections checked and tightened 11/05/21 1200   Color/Movement/Sensation Capillary refill less than 3 sec 11/05/21 1200   Line Necessity Yes, meets criteria 11/05/21 1200   Dressing Type Transparent 11/05/21 1200   Dressing Status Clean, dry, intact 11/05/21 1200   Dressing Intervention Dressing reinforced 11/05/21 0400   Dressing Change Due 11/08/21 11/05/21 1200   Number of days: 3     Data:     LABS    CMP:   Recent Labs   Lab 11/05/21  1156 11/05/21  0657 11/05/21  0615 11/05/21  0508 11/05/21  0337 11/05/21  0302 11/04/21  1019 11/04/21  1015 11/04/21  0351 11/04/21  0339 11/03/21  2210 11/03/21  2204 11/03/21  0411 11/03/21  0407 11/02/21  1214 11/02/21  1211 11/02/21  0826 11/02/21  0815   NA  --   --   --   --  151*  --   --  151*  --  148*  148*  --  150*   < > 148*   < > 153*  153*   < > 153*   POTASSIUM  --   --   --   --  4.1  --   --  3.5  --  4.0  4.0  --  4.1   < > 5.3   < > 4.6  4.6   < > 4.7   CHLORIDE  --   --   --   --  123*  --   --  121*  --  121*  121*  --  119*   < > 116*   < > 117*  117*   < > 119*   CO2  --   --   --   --  24  --   --  26  --  23  23  --  24   < > 27   < > 32  32   < > 22   ANIONGAP  --   --   --   --  4  --   --  4  --  4  4  --  7   < > 5   < > 4  4   < > 12   * 111* 117* 119* 130*   < >   < > 169*   < > 138*  138*   < > 160*   < > 138*   < > 56*  56*   < > 240*  225*   BUN  --   --   --   --  60*   --   --  80*  --  86*  86*  --  91*   < > 109*   < > 95*  95*   < > 82*   CR  --   --   --   --  0.71  --   --  0.88  --  0.88  0.88  --  0.97   < > 1.27*   < > 1.30*  1.30*   < > 1.17   GFRESTIMATED  --   --   --   --  >90  --   --  >90  --  >90  >90  --  87   < > 63   < > 61  61   < > 69   ERIK  --   --   --   --  8.2*  --   --  8.7  --  8.7  8.7  --  9.0   < > 9.4   < > 10.0  10.0   < > 7.2*   MAG  --   --   --   --  1.9  --   --   --   --  2.0  --   --   --  2.1  --   --   --  1.9   PHOS  --   --   --   --  3.0  --   --   --   --  3.7  --   --   --  4.4  --   --   --  7.0*   PROTTOTAL  --   --   --   --  4.9*  --   --   --   --  4.8*  --   --   --  4.8*  --  4.5*   < > 3.8*   ALBUMIN  --   --   --   --  1.8*  --   --   --   --  1.7*  --   --   --  1.8*  --  1.5*   < > 1.1*   BILITOTAL  --   --   --   --  0.5  --   --   --   --  0.4  --   --   --  0.5  --  0.6   < > 0.2   ALKPHOS  --   --   --   --  130  --   --   --   --  129  --   --   --  146  --  182*   < > 110   AST  --   --   --   --  59*  --   --   --   --  117*  --   --   --  297*  --  450*   < > 33   ALT  --   --   --   --  226*  --   --   --   --  262*  --   --   --  356*  --  392*   < > 50    < > = values in this interval not displayed.     CBC:   Recent Labs   Lab 11/05/21  0337 11/04/21  1816 11/04/21  0339 11/03/21  1652   WBC 18.5* 21.4* 25.1* 26.9*   RBC 2.99* 3.09* 3.04* 3.04*   HGB 8.9* 9.1* 9.1* 9.2*   HCT 28.3* 29.1* 29.0* 28.1*   MCV 95 94 95 92   MCH 29.8 29.4 29.9 30.3   MCHC 31.4* 31.3* 31.4* 32.7   RDW 18.6* 18.8* 19.2* 19.6*    182 173 156       INR:   Recent Labs   Lab 11/05/21  0337 11/04/21  0339 11/03/21  0407 11/02/21  1053   INR 1.27* 1.32* 1.36* 1.55*       Glucose:   Recent Labs   Lab 11/05/21  1156 11/05/21  0657 11/05/21  0615 11/05/21  0508 11/05/21  0337 11/05/21  0302   * 111* 117* 119* 130* 138*       Blood Gas:   Recent Labs   Lab 11/05/21  0504 11/05/21  0336 11/04/21  1016 11/02/21  0826  11/02/21  0815   PHV  --   --   --   --  7.23*   PCO2V  --   --   --   --  53*   PO2V  --   --   --   --  25   HCO3V  --   --   --   --  22   LORI  --   --   --   --  -5.4   O2PER 40 40 40   < > 100    < > = values in this interval not displayed.       Culture Data No results for input(s): CULT in the last 168 hours.    Virology Data:   Lab Results   Component Value Date    FLUAH1 Negative 10/17/2021    FLUAH3 Negative 10/17/2021    GH8059 Negative 10/17/2021    IFLUB Negative 10/17/2021    RSVA Negative 10/17/2021    RSVB Negative 10/17/2021    PIV1 Negative 10/17/2021    PIV2 Negative 10/17/2021    PIV3 Negative 10/17/2021    HMPV Negative 10/17/2021    HRVS Negative 10/17/2021    ADVBE Negative 10/17/2021    ADVC Negative 10/17/2021       Historical CMV results (last 3 of prior testing):  Lab Results   Component Value Date    CMVQNT Not Detected 10/26/2021     No results found for: CMVLOG    Urine Studies    Recent Labs   Lab Test 11/01/21  1336 10/25/21  1507   URINEPH 5.5 5.5   NITRITE Negative Negative   LEUKEST Negative Negative   WBCU 0 2       Most Recent [facility] Pulmonary Function Testing (FVC/FEV1 only)  No results found for: 20002  No results found for: 20003  No results found for: 20015  No results found for: 20016    IMAGING    Recent Results (from the past 48 hour(s))   IR Chest Tube Place Non Tunneled Left    Narrative    Shayna [facility]   [phone]      [id number]    SHAYNA GUERRIER  [id number]  1965;  56 years    IR CHEST TUBE PLACEMENT NON-TUNNELLED LEFT  bilateral lung transplant, left empyema    -----    PRE-OPERATIVE DIAGNOSIS:  Pleural effusion    POST-OPERATIVE DIAGNOSIS:  Same    PROCEDURE:  Image guided chest tube placement      Impression    IMPRESSION:  Completed ultrasound guided bedside placement of  LEFT-sided chest tube. ?A 14 [place] non-locking drain placed and  connected to a closed-system drainage container under water seal. ?A  sample of 120 mL dark red colored fluid was  collected. ?No immediate  complication. ?Dx: ?Pleural effusion. ?Mariam.     Evaluation for ultrasound guided placement of RIGHT-sided chest tube.  ?Bedside ultrasound shows very little effusion. ?No attempt at chest  tube placement made. ?Dx: ?Pleural effusion. ?Mariam.     -----    CLINICAL HISTORY:  Pleural effusion; chest tube placement requested    PERFORMED BY:  MP Chau PA-C (Interventional  Radiology)    CONSENT:  The patient understood the limitations, alternatives, and  risks of the procedure and agreed to the procedure.  Written informed  consent was obtained and is documented in the patient record.    MEDICATIONS:  1% lidocaine was used for local anesthesia    NURSING:  The patient was placed on continuous vital signs monitoring.   Vital signs were monitored by nursing staff under my supervision.    DESCRIPTION:  Ultrasound evaluation revealed a fluid collection in the  LEFT pleural space.  The skin overlying the fluid collection was  prepped and draped in the usual sterile fashion and anesthetized.    A small skin nick was made with a #11 scalpel blade.  Under continuous  ultrasound visualization, a 5 Mozambican, centesis needle/catheter system  was advanced into the fluid collection on a slip tip syringe.  Once  fluid was aspirated, the catheter was advanced off the needle into the  pleural space and the needle was removed.  A 0.035 guide wire was  advanced through the catheter.  Over-the-wire dilation was achieved  with fascial dilators.  A non-locking drain was then placed  over-the-wire and wire removed.  Valved closure of drain was made.   The chest tube was secured to the skin with a retention suture and a  sterile bandage was applied.  Drain valve removed.  The chest tube was  connected to closed-system drainage container under water seal for  transport to unit.  Ultrasound images were stored in the patient  record.    Evaluation for ultrasound guided placement of RIGHT-sided  "chest tube.  ?Bedside ultrasound shows very little effusion. ?No attempt at chest  tube placement made. ?    COMPLICATIONS:  No immediate complication;  the patient remained  stable throughout the procedure    ESTIMATED BLOOD LOSS:  Minimal    SPECIMENS:  Per above    -----  \"The physician assistant (PA) who performed this procedure and signed  the above report is licensed to practice in the state [facility]  pursuant to [place] Statute 147A.09.  This includes meeting the Statute and  [place] Board of Medical Practice requirement of a collaborating  physician.\"  -----    SABINE ANDERSON PA-C         SYSTEM ID:  [id number]    Upper Extremity Venous Duplex Bilat   Result Value    Radiologist flags DVT (Urgent)    Narrative    EXAMINATION: DOPPLER VENOUS ULTRASOUND OF BILATERAL UPPER EXTREMTIES,  11/4/2021 10:59 AM     INDICATION: Evaluate for internal jugular clots and upper bilateral  extremity thrombus.    COMPARISON: Venous approximately ultrasound 10/19/2021    TECHNIQUE:  Gray-scale evaluation with compression, spectral flow, and  color Doppler assessment of the deep venous system of both upper  extremities.    FINDINGS:  There is nonocclusive thrombus noted in the right mid to medial  subclavian vein at the previous area of central line    Normal blood flow and waveforms are demonstrated in bilateral internal  jugular, innominate, axillary and left subclavian veins. There is  normal compressibility of the brachial, basilic and cephalic veins  bilaterally.      Impression    IMPRESSION:  Nonocclusive thrombus in the right mid to medial subclavian vein.    [Urgent Result: DVT]    Finding was identified on 11/4/2021 11:05 AM.     Dr. Pritchett was contacted by Dr. Ansari at 11/4/2021 11:09 AM and  verbalized understanding of the urgent finding.     I have personally reviewed the examination and initial interpretation  and I agree with the findings.    ARIES DELGADO MD         SYSTEM ID:  [id number]   XR " Abdomen Port 1 View    Narrative    EXAMINATION:  XR ABDOMEN PORT 1 VIEWS 11/4/2021 8:03 PM.    COMPARISON: 11/2/2021.    HISTORY:  Ileus    FINDINGS: Portable AP view of the abdomen and pelvis. Partly  visualized surgical changes of the chest. Left basilar chest tube.  Percutaneous gastrojejunostomy tube. Watson catheter noted.  Nonobstructive bowel gas pattern. No dilated loops of bowel. No  pneumatosis. Evaluation of free air is limited on this supine exam.  Bibasilar atelectasis.      Impression    IMPRESSION:   Nonobstructive bowel gas pattern.    I have personally reviewed the examination and initial interpretation  and I agree with the findings.    ARIES DELGADO MD         SYSTEM ID:  [id number]   XR Chest Port 1 View    Narrative    Exam: XR CHEST PORT 1 VIEW, 11/5/2021 12:43 AM    Indication: s/p lung transplant    Comparison: 11/4/2021    Findings:   Tracheostomy distal tip projects over the high thoracic trachea. There  is a left-sided chest tube in place. Postsurgical changes of lung  transplant similar to prior. Left PICC distal tip projects over the  low superior vena cava. Cardiac mediastinal silhouette is unchanged.  Small bilateral pleural effusions with hazy bibasilar atelectasis  similar to prior. No pneumothorax. No acute osseous abnormalities.  Partially visualized percutaneous gastrostomy tube in the left upper  quadrant.      Impression    Impression: Stable exam. Small bilateral pleural effusions with hazy  bibasilar atelectasis.    I have personally reviewed the examination and initial interpretation  and I agree with the findings.    KAMLA SHIN MD         SYSTEM ID:  [id number]   CT Head w/o Contrast    Narrative    CT HEAD W/O CONTRAST 11/5/2021 9:09 AM    History: Mental status change, unknown cause     Comparison: 11/2/2021    Technique: Using multidetector thin collimation helical acquisition  technique, axial, coronal and sagittal CT images from the skull base  to the vertex  were obtained without intravenous contrast.    Findings: There is no intracranial hemorrhage, mass effect, or midline  shift. Hypodense evolving subacute infarcts in the left occipital  lobe, right occipital lobe, bilateral parietal lobes are not  significantly changed. Bilateral serpiginous occipital parietal  cerebral hyperdensities, likely representing cortical laminar  necrosis. No new areas of infarction. No extra-axial fluid  collections. Proportionate size of the ventricles and sulci.  Gray-white matter differentiation is otherwise maintained. Basal  cisterns are clear. Normal noncontrast appearance of the major  cerebral vasculature. Slightly edematous. No bilateral mastoid air  cells. Paranasal sinuses are otherwise clear..     The bony calvaria and the bones of the skull base are normal.      Impression    Impression:  1. 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  2. No new acute intracranial findings.         I have personally reviewed the examination and initial interpretation  and I agree with the findings.    SUZI HOLDEN MD         SYSTEM ID:  [id number]

## 2021-11-05 NOTE — PLAN OF CARE
Pertinent PMH: Edson is being followed by [facility] who was initially admitted on 09/05 for ILD and transplant w/o. 10/16 Pt underwent a BSLT c/b CVA/encephalopathy on 10/22 as well as candidia & fungal infections, BRENNAN and respiratory failure requiring trach & PEG placement on 10/28. PEA arrest 2/2 GIB on 11/02.  Major Shift Events: Pupils equal and reactive. Withdrawals to pain in the lower extremities. Inconsistently follows commands, able to wiggle toes, arouses to voice, neurology following. CMV-AC settings on vent at 12/40%/400/8, creamy thick secretions from trach. Trach dome changed. SR, HR in 80's. MAP 70 - 90. G-tube & J-tube to gravity with 25 mL OP, TF off overnight per [facility]TS fellow. AXR nonobstructive bowel gas. UOP of 75 mL/Hr. Dark/black stool in rectal tube. HBG 8.9. All dressings changed. Heparin gtt at 400, stopped at 0400. Protonix at 8 mg/Hr. Insulin gtt off d/t TF discontinuation and euglycemia.  Plan: Neuro, ENT, transplant following. Endoscopy planned for 1000. Possible need for repeat brain MR once stable. Continue to monitor.     For vital signs, hemodynamics and complete assessments, please see documentation flowsheet.

## 2021-11-05 NOTE — PROGRESS NOTES
Brief GI Note     A 56 year old gentleman admitted 9/5/21 with acute on chronic respiratory failure now s/p 10/16/21 bilateral lung transplant for NSIP / ILD immunosuppressed (tacrolimus, mycophenolate, prednisone)  with rheumatoid arthritis who also has a history of bronchiectasis, moderate PH, SALTY, chronic HSV infection, hypogammaglobulinemia, steroid-induced diabetes, hypothyroidism, hypertension, hyperlipidemia, duodenal anomaly, anxiety, and depression. 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 Ventilator dependent (on low vent settings, s/p 10/29/21 tracheostomy) with a multifactorial encephalopathy and persistent leukocytosis, now with recurrent GI bleeding s/p EGD on 11/3 with large adherent gastric clot, unable to be displaced with irrigation, though not actively bleeding with plans for repeat EGD today     He is s/p re evaluation with EGD today     EGD findings 11/5    - Multiple circumferential erosions consistent with  suction marks noted in the stomach.   - Gastrostomy present characterized by ulceration at the base of peg-tube (aka bumper ulcer).   - The GJ tube is coiled in the stomach. The tip of the GJ tube is currently in the duodenal bulb.    Plan:   -Prilosec 40mg BID X 8 weeks and then 40mg daily indefinitely as long as he has his PEG in place   -No repeat EGD needed  -No GI Follow up needed      Thank you for allowing us to participate in the care of this patient. GI will sign off. Please call us back with questions or concerns       Praveen Shah MD  GI fellow   P: [phone]

## 2021-11-05 NOTE — PLAN OF CARE
Major Shift Events:  Upper endoscopy today, no bleeding noted per GI, GI signed off. PEG tube noted to be coiled in stomach (see GI note). Thoracic surgery notified as they placed it. MD wanted reglan an repeat ABD XR. Tip of J tube noted to be post pyloric so pulm ok with restarting TF as IR will not advance tube so pt would need surgical intervention which MDs are trying to avoid. TF started at 10ml/hr at 1700, advance per order. G tube clamped, if pt show s/s nausea ok to vent but otherwise keep clamped. Restarted heparin gtt, recheck 10a tonight. ART line positional, MD would like to keep. Lasix given x1 today with good result. Right hand noted to be swollen this am, with lasix and elevation, swelling has gone down. Sodium elevated, getting free water flushes 100 ml/hr. Repeat head CT done this am, no changes noted. Pt following commands when asked to wiggle his toes and open his mouth, withdraws slightly from pain with hands. Care plan per family request for update scheduled Tuesday. Pressure supported via vent from 12pm through end of shift.    Plan: monitor neuro and resp status, pressure support as able. Advance TF per order. Monitor 10a. Monitor sodium level. Care conference for family update on Tuesday.     For vital signs and complete assessments, please see documentation flowsheets.

## 2021-11-06 ENCOUNTER — APPOINTMENT (OUTPATIENT)
Dept: GENERAL RADIOLOGY | Facility: CLINIC | Age: 56
End: 2021-11-06
Attending: STUDENT IN AN ORGANIZED HEALTH CARE EDUCATION/TRAINING PROGRAM
Payer: COMMERCIAL

## 2021-11-06 LAB
ALBUMIN SERPL-MCNC: 1.9 G/DL (ref 3.4–5)
ALP SERPL-CCNC: 149 U/L (ref 40–150)
ALT SERPL W P-5'-P-CCNC: 176 U/L (ref 0–70)
ANION GAP SERPL CALCULATED.3IONS-SCNC: 4 MMOL/L (ref 3–14)
ANION GAP SERPL CALCULATED.3IONS-SCNC: 6 MMOL/L (ref 3–14)
ANION GAP SERPL CALCULATED.3IONS-SCNC: 6 MMOL/L (ref 3–14)
AST SERPL W P-5'-P-CCNC: 42 U/L (ref 0–45)
BASE EXCESS BLDA CALC-SCNC: 0.3 MMOL/L (ref -9–1.8)
BILIRUB SERPL-MCNC: 0.5 MG/DL (ref 0.2–1.3)
BUN SERPL-MCNC: 31 MG/DL (ref 7–30)
BUN SERPL-MCNC: 36 MG/DL (ref 7–30)
BUN SERPL-MCNC: 36 MG/DL (ref 7–30)
CALCIUM SERPL-MCNC: 8 MG/DL (ref 8.5–10.1)
CALCIUM SERPL-MCNC: 8.4 MG/DL (ref 8.5–10.1)
CALCIUM SERPL-MCNC: 8.4 MG/DL (ref 8.5–10.1)
CHLORIDE BLD-SCNC: 114 MMOL/L (ref 94–109)
CO2 SERPL-SCNC: 24 MMOL/L (ref 20–32)
CO2 SERPL-SCNC: 24 MMOL/L (ref 20–32)
CO2 SERPL-SCNC: 26 MMOL/L (ref 20–32)
CREAT SERPL-MCNC: 0.58 MG/DL (ref 0.66–1.25)
CREAT SERPL-MCNC: 0.62 MG/DL (ref 0.66–1.25)
CREAT SERPL-MCNC: 0.62 MG/DL (ref 0.66–1.25)
ERYTHROCYTE [DISTWIDTH] IN BLOOD BY AUTOMATED COUNT: 18 % (ref 10–15)
GFR SERPL CREATININE-BSD FRML MDRD: >90 ML/MIN/1.73M2
GLUCOSE BLD-MCNC: 122 MG/DL (ref 70–99)
GLUCOSE BLD-MCNC: 190 MG/DL (ref 70–99)
GLUCOSE BLD-MCNC: 190 MG/DL (ref 70–99)
GLUCOSE BLDC GLUCOMTR-MCNC: 117 MG/DL (ref 70–99)
GLUCOSE BLDC GLUCOMTR-MCNC: 120 MG/DL (ref 70–99)
GLUCOSE BLDC GLUCOMTR-MCNC: 126 MG/DL (ref 70–99)
GLUCOSE BLDC GLUCOMTR-MCNC: 134 MG/DL (ref 70–99)
GLUCOSE BLDC GLUCOMTR-MCNC: 141 MG/DL (ref 70–99)
GLUCOSE BLDC GLUCOMTR-MCNC: 142 MG/DL (ref 70–99)
GLUCOSE BLDC GLUCOMTR-MCNC: 160 MG/DL (ref 70–99)
GLUCOSE BLDC GLUCOMTR-MCNC: 162 MG/DL (ref 70–99)
GLUCOSE BLDC GLUCOMTR-MCNC: 172 MG/DL (ref 70–99)
GLUCOSE BLDC GLUCOMTR-MCNC: 80 MG/DL (ref 70–99)
HCO3 BLD-SCNC: 25 MMOL/L (ref 21–28)
HCT VFR BLD AUTO: 30.4 % (ref 40–53)
HGB BLD-MCNC: 9.5 G/DL (ref 13.3–17.7)
HOLD SPECIMEN: NORMAL
INR PPP: 1.26 (ref 0.85–1.15)
MAGNESIUM SERPL-MCNC: 1.6 MG/DL (ref 1.6–2.3)
MCH RBC QN AUTO: 29.6 PG (ref 26.5–33)
MCHC RBC AUTO-ENTMCNC: 31.3 G/DL (ref 31.5–36.5)
MCV RBC AUTO: 95 FL (ref 78–100)
O2/TOTAL GAS SETTING VFR VENT: 40 %
PCO2 BLD: 37 MM HG (ref 35–45)
PH BLD: 7.43 [PH] (ref 7.35–7.45)
PHOSPHATE SERPL-MCNC: 2.9 MG/DL (ref 2.5–4.5)
PLATELET # BLD AUTO: 176 10E3/UL (ref 150–450)
PO2 BLD: 109 MM HG (ref 80–105)
POTASSIUM BLD-SCNC: 3.5 MMOL/L (ref 3.4–5.3)
POTASSIUM BLD-SCNC: 3.9 MMOL/L (ref 3.4–5.3)
POTASSIUM BLD-SCNC: 3.9 MMOL/L (ref 3.4–5.3)
PROT SERPL-MCNC: 5.1 G/DL (ref 6.8–8.8)
RBC # BLD AUTO: 3.21 10E6/UL (ref 4.4–5.9)
SODIUM SERPL-SCNC: 144 MMOL/L (ref 133–144)
TACROLIMUS BLD-MCNC: 8.6 UG/L (ref 5–15)
TME LAST DOSE: NORMAL H
TME LAST DOSE: NORMAL H
UFH PPP CHRO-ACNC: 0.27 IU/ML
UFH PPP CHRO-ACNC: 0.28 IU/ML
UFH PPP CHRO-ACNC: 0.51 IU/ML
WBC # BLD AUTO: 19.7 10E3/UL (ref 4–11)

## 2021-11-06 PROCEDURE — 85520 HEPARIN ASSAY: CPT | Performed by: ANESTHESIOLOGY

## 2021-11-06 PROCEDURE — 80048 BASIC METABOLIC PNL TOTAL CA: CPT

## 2021-11-06 PROCEDURE — 84100 ASSAY OF PHOSPHORUS: CPT | Performed by: THORACIC SURGERY (CARDIOTHORACIC VASCULAR SURGERY)

## 2021-11-06 PROCEDURE — 82803 BLOOD GASES ANY COMBINATION: CPT | Performed by: STUDENT IN AN ORGANIZED HEALTH CARE EDUCATION/TRAINING PROGRAM

## 2021-11-06 PROCEDURE — 200N000002 HC R&B ICU UMMC

## 2021-11-06 PROCEDURE — 250N000011 HC RX IP 250 OP 636

## 2021-11-06 PROCEDURE — 250N000012 HC RX MED GY IP 250 OP 636 PS 637: Performed by: PHYSICIAN ASSISTANT

## 2021-11-06 PROCEDURE — 99233 SBSQ HOSP IP/OBS HIGH 50: CPT | Mod: 24 | Performed by: INTERNAL MEDICINE

## 2021-11-06 PROCEDURE — 94640 AIRWAY INHALATION TREATMENT: CPT | Mod: 76

## 2021-11-06 PROCEDURE — 85610 PROTHROMBIN TIME: CPT | Performed by: STUDENT IN AN ORGANIZED HEALTH CARE EDUCATION/TRAINING PROGRAM

## 2021-11-06 PROCEDURE — 80053 COMPREHEN METABOLIC PANEL: CPT

## 2021-11-06 PROCEDURE — 71045 X-RAY EXAM CHEST 1 VIEW: CPT | Mod: 26 | Performed by: RADIOLOGY

## 2021-11-06 PROCEDURE — 250N000013 HC RX MED GY IP 250 OP 250 PS 637: Performed by: STUDENT IN AN ORGANIZED HEALTH CARE EDUCATION/TRAINING PROGRAM

## 2021-11-06 PROCEDURE — 250N000009 HC RX 250: Performed by: STUDENT IN AN ORGANIZED HEALTH CARE EDUCATION/TRAINING PROGRAM

## 2021-11-06 PROCEDURE — 71045 X-RAY EXAM CHEST 1 VIEW: CPT

## 2021-11-06 PROCEDURE — 99233 SBSQ HOSP IP/OBS HIGH 50: CPT | Mod: GC | Performed by: STUDENT IN AN ORGANIZED HEALTH CARE EDUCATION/TRAINING PROGRAM

## 2021-11-06 PROCEDURE — 250N000013 HC RX MED GY IP 250 OP 250 PS 637

## 2021-11-06 PROCEDURE — 250N000013 HC RX MED GY IP 250 OP 250 PS 637: Performed by: ANESTHESIOLOGY

## 2021-11-06 PROCEDURE — 94003 VENT MGMT INPAT SUBQ DAY: CPT

## 2021-11-06 PROCEDURE — 83735 ASSAY OF MAGNESIUM: CPT | Performed by: THORACIC SURGERY (CARDIOTHORACIC VASCULAR SURGERY)

## 2021-11-06 PROCEDURE — 85520 HEPARIN ASSAY: CPT | Performed by: THORACIC SURGERY (CARDIOTHORACIC VASCULAR SURGERY)

## 2021-11-06 PROCEDURE — 250N000013 HC RX MED GY IP 250 OP 250 PS 637: Performed by: NURSE PRACTITIONER

## 2021-11-06 PROCEDURE — 85027 COMPLETE CBC AUTOMATED: CPT

## 2021-11-06 PROCEDURE — 94640 AIRWAY INHALATION TREATMENT: CPT

## 2021-11-06 PROCEDURE — 250N000011 HC RX IP 250 OP 636: Performed by: STUDENT IN AN ORGANIZED HEALTH CARE EDUCATION/TRAINING PROGRAM

## 2021-11-06 PROCEDURE — 80197 ASSAY OF TACROLIMUS: CPT | Performed by: STUDENT IN AN ORGANIZED HEALTH CARE EDUCATION/TRAINING PROGRAM

## 2021-11-06 PROCEDURE — 999N000157 HC STATISTIC RCP TIME EA 10 MIN

## 2021-11-06 PROCEDURE — 250N000012 HC RX MED GY IP 250 OP 636 PS 637: Performed by: NURSE PRACTITIONER

## 2021-11-06 PROCEDURE — 99291 CRITICAL CARE FIRST HOUR: CPT | Mod: 24 | Performed by: ANESTHESIOLOGY

## 2021-11-06 PROCEDURE — 94668 MNPJ CHEST WALL SBSQ: CPT

## 2021-11-06 PROCEDURE — 250N000013 HC RX MED GY IP 250 OP 250 PS 637: Performed by: THORACIC SURGERY (CARDIOTHORACIC VASCULAR SURGERY)

## 2021-11-06 PROCEDURE — 250N000011 HC RX IP 250 OP 636: Performed by: NURSE PRACTITIONER

## 2021-11-06 PROCEDURE — 258N000003 HC RX IP 258 OP 636: Performed by: NURSE PRACTITIONER

## 2021-11-06 PROCEDURE — 250N000012 HC RX MED GY IP 250 OP 636 PS 637: Performed by: INTERNAL MEDICINE

## 2021-11-06 PROCEDURE — C9113 INJ PANTOPRAZOLE SODIUM, VIA: HCPCS | Performed by: NURSE PRACTITIONER

## 2021-11-06 PROCEDURE — 999N000015 HC STATISTIC ARTERIAL MONITORING DAILY

## 2021-11-06 RX ORDER — MAGNESIUM SULFATE HEPTAHYDRATE 40 MG/ML
2 INJECTION, SOLUTION INTRAVENOUS ONCE
Status: COMPLETED | OUTPATIENT
Start: 2021-11-06 | End: 2021-11-06

## 2021-11-06 RX ORDER — METOPROLOL TARTRATE 25 MG/1
50 TABLET, FILM COATED ORAL 2 TIMES DAILY
Status: DISCONTINUED | OUTPATIENT
Start: 2021-11-06 | End: 2021-11-09

## 2021-11-06 RX ORDER — METOPROLOL TARTRATE 25 MG/1
25 TABLET, FILM COATED ORAL ONCE
Status: COMPLETED | OUTPATIENT
Start: 2021-11-06 | End: 2021-11-06

## 2021-11-06 RX ORDER — POTASSIUM CHLORIDE 1.5 G/1.58G
20 POWDER, FOR SOLUTION ORAL ONCE
Status: COMPLETED | OUTPATIENT
Start: 2021-11-06 | End: 2021-11-06

## 2021-11-06 RX ORDER — FUROSEMIDE 10 MG/ML
20 INJECTION INTRAMUSCULAR; INTRAVENOUS ONCE
Status: COMPLETED | OUTPATIENT
Start: 2021-11-06 | End: 2021-11-06

## 2021-11-06 RX ORDER — METOCLOPRAMIDE HYDROCHLORIDE 5 MG/ML
10 INJECTION INTRAMUSCULAR; INTRAVENOUS EVERY 8 HOURS
Status: COMPLETED | OUTPATIENT
Start: 2021-11-06 | End: 2021-11-07

## 2021-11-06 RX ORDER — AMOXICILLIN 250 MG
1 CAPSULE ORAL
Status: DISCONTINUED | OUTPATIENT
Start: 2021-11-06 | End: 2021-12-13 | Stop reason: HOSPADM

## 2021-11-06 RX ADMIN — PREDNISOLONE 12.5 MG: 15 SOLUTION ORAL at 20:07

## 2021-11-06 RX ADMIN — HUMAN INSULIN 1 UNITS/HR: 100 INJECTION, SOLUTION SUBCUTANEOUS at 05:58

## 2021-11-06 RX ADMIN — MAGNESIUM SULFATE IN WATER 2 G: 40 INJECTION, SOLUTION INTRAVENOUS at 10:07

## 2021-11-06 RX ADMIN — ACYCLOVIR 400 MG: 200 SUSPENSION ORAL at 20:06

## 2021-11-06 RX ADMIN — SULFAMETHOXAZOLE AND TRIMETHOPRIM 80 MG: 200; 40 SUSPENSION ORAL at 08:14

## 2021-11-06 RX ADMIN — METOPROLOL TARTRATE 25 MG: 25 TABLET, FILM COATED ORAL at 08:16

## 2021-11-06 RX ADMIN — SODIUM CHLORIDE 8 MG/HR: 9 INJECTION, SOLUTION INTRAVENOUS at 03:03

## 2021-11-06 RX ADMIN — ACETYLCYSTEINE 2 ML: 200 SOLUTION ORAL; RESPIRATORY (INHALATION) at 11:27

## 2021-11-06 RX ADMIN — LEVALBUTEROL HYDROCHLORIDE 1.25 MG: 1.25 SOLUTION RESPIRATORY (INHALATION) at 19:13

## 2021-11-06 RX ADMIN — TACROLIMUS 1 MG: 0.5 CAPSULE ORAL at 08:14

## 2021-11-06 RX ADMIN — POTASSIUM CHLORIDE 20 MEQ: 1.5 POWDER, FOR SOLUTION ORAL at 18:01

## 2021-11-06 RX ADMIN — LEVALBUTEROL HYDROCHLORIDE 1.25 MG: 1.25 SOLUTION RESPIRATORY (INHALATION) at 11:27

## 2021-11-06 RX ADMIN — FLUCONAZOLE IN SODIUM CHLORIDE 400 MG: 2 INJECTION, SOLUTION INTRAVENOUS at 12:24

## 2021-11-06 RX ADMIN — MULTIVIT AND MINERALS-FERROUS GLUCONATE 9 MG IRON/15 ML ORAL LIQUID 15 ML: at 08:14

## 2021-11-06 RX ADMIN — LEVOTHYROXINE SODIUM 25 MCG: 0.03 TABLET ORAL at 08:16

## 2021-11-06 RX ADMIN — METOPROLOL TARTRATE 50 MG: 25 TABLET, FILM COATED ORAL at 20:05

## 2021-11-06 RX ADMIN — LEVALBUTEROL HYDROCHLORIDE 1.25 MG: 1.25 SOLUTION RESPIRATORY (INHALATION) at 08:37

## 2021-11-06 RX ADMIN — ACETYLCYSTEINE 2 ML: 200 SOLUTION ORAL; RESPIRATORY (INHALATION) at 08:36

## 2021-11-06 RX ADMIN — PREDNISOLONE 15 MG: 15 SOLUTION ORAL at 08:14

## 2021-11-06 RX ADMIN — ACETYLCYSTEINE 2 ML: 200 SOLUTION ORAL; RESPIRATORY (INHALATION) at 15:36

## 2021-11-06 RX ADMIN — LIDOCAINE 2 PATCH: 560 PATCH PERCUTANEOUS; TOPICAL; TRANSDERMAL at 08:14

## 2021-11-06 RX ADMIN — TACROLIMUS 1 MG: 5 CAPSULE ORAL at 17:45

## 2021-11-06 RX ADMIN — Medication 40 MG: at 20:06

## 2021-11-06 RX ADMIN — HEPARIN SODIUM 650 UNITS/HR: 10000 INJECTION, SOLUTION INTRAVENOUS at 06:11

## 2021-11-06 RX ADMIN — NYSTATIN 1000000 UNITS: 500000 SUSPENSION ORAL at 20:07

## 2021-11-06 RX ADMIN — CALCIUM CARBONATE 600 MG (1,500 MG)-VITAMIN D3 400 UNIT TABLET 1 TABLET: at 08:16

## 2021-11-06 RX ADMIN — NYSTATIN 1000000 UNITS: 500000 SUSPENSION ORAL at 16:14

## 2021-11-06 RX ADMIN — Medication 40 MG: at 11:11

## 2021-11-06 RX ADMIN — ACETYLCYSTEINE 2 ML: 200 SOLUTION ORAL; RESPIRATORY (INHALATION) at 19:13

## 2021-11-06 RX ADMIN — METOPROLOL TARTRATE 25 MG: 25 TABLET, FILM COATED ORAL at 10:07

## 2021-11-06 RX ADMIN — ACYCLOVIR 400 MG: 200 SUSPENSION ORAL at 08:14

## 2021-11-06 RX ADMIN — HEPARIN SODIUM 650 UNITS/HR: 10000 INJECTION, SOLUTION INTRAVENOUS at 17:44

## 2021-11-06 RX ADMIN — LEVALBUTEROL HYDROCHLORIDE 1.25 MG: 1.25 SOLUTION RESPIRATORY (INHALATION) at 15:36

## 2021-11-06 RX ADMIN — FUROSEMIDE 20 MG: 10 INJECTION, SOLUTION INTRAVENOUS at 10:07

## 2021-11-06 RX ADMIN — LABETALOL HYDROCHLORIDE 20 MG: 5 INJECTION, SOLUTION INTRAVENOUS at 02:02

## 2021-11-06 RX ADMIN — MYCOPHENOLATE MOFETIL 1000 MG: 200 POWDER, FOR SUSPENSION ORAL at 20:06

## 2021-11-06 RX ADMIN — CALCIUM CARBONATE 600 MG (1,500 MG)-VITAMIN D3 400 UNIT TABLET 1 TABLET: at 17:44

## 2021-11-06 RX ADMIN — NYSTATIN 1000000 UNITS: 500000 SUSPENSION ORAL at 08:14

## 2021-11-06 RX ADMIN — METOCLOPRAMIDE HYDROCHLORIDE 10 MG: 5 INJECTION INTRAMUSCULAR; INTRAVENOUS at 10:08

## 2021-11-06 RX ADMIN — AMIODARONE HYDROCHLORIDE 200 MG: 200 TABLET ORAL at 08:16

## 2021-11-06 RX ADMIN — NYSTATIN 1000000 UNITS: 500000 SUSPENSION ORAL at 12:24

## 2021-11-06 RX ADMIN — METOCLOPRAMIDE HYDROCHLORIDE 10 MG: 5 INJECTION INTRAMUSCULAR; INTRAVENOUS at 17:44

## 2021-11-06 RX ADMIN — MYCOPHENOLATE MOFETIL 1000 MG: 200 POWDER, FOR SUSPENSION ORAL at 08:14

## 2021-11-06 ASSESSMENT — ACTIVITIES OF DAILY LIVING (ADL)
ADLS_ACUITY_SCORE: 22

## 2021-11-06 ASSESSMENT — MIFFLIN-ST. JEOR: SCORE: 1451

## 2021-11-06 NOTE — PROGRESS NOTES
THORACIC SURGERY PROGRESS NOTE     S: patient having refluxing of tube feeds into the stomach yesterday, patient is unchanged at this time, unable to obtain subjective information due to encephalopathy       O:  Patient Vitals for the past 24 hrs:   BP Temp Temp src Pulse Resp SpO2 Weight   11/06/21 0836 (!) 159/94 -- -- 96 -- 100 % --   11/06/21 0800 (!) 159/94 98.9  F (37.2  C) Axillary 96 17 100 % --   11/06/21 0700 -- -- -- 89 16 99 % --   11/06/21 0600 129/81 -- -- 87 18 99 % 71 kg (156 lb 8.4 oz)   11/06/21 0500 (!) 142/83 -- -- 82 15 99 % --   11/06/21 0400 (!) 149/86 98.1  F (36.7  C) Axillary 87 18 97 % --   11/06/21 0300 134/84 -- -- 80 22 98 % --   11/06/21 0200 (!) 153/86 -- -- 98 18 100 % --   11/06/21 0115 (!) 158/93 -- -- 94 -- 99 % --   11/06/21 0100 (!) 160/86 -- -- 95 16 99 % --   11/06/21 0000 (!) 152/88 98.9  F (37.2  C) Axillary 92 20 99 % --   11/05/21 2300 (!) 144/84 -- -- 93 23 98 % --   11/05/21 2200 (!) 147/85 -- -- 92 26 98 % --   11/05/21 2100 128/82 -- -- 89 20 97 % --   11/05/21 2000 (!) 144/84 99  F (37.2  C) Axillary 105 (!) 31 97 % --   11/05/21 1951 (!) 147/74 -- -- -- -- -- --   11/05/21 1900 (!) 159/87 -- -- 115 26 97 % --   11/05/21 1800 138/82 -- -- 100 23 98 % --   11/05/21 1700 111/60 -- -- 92 17 97 % --   11/05/21 1600 (!) 152/86 98.7  F (37.1  C) Axillary 90 21 97 % --   11/05/21 1500 134/71 -- -- 86 27 99 % --   11/05/21 1400 135/76 -- -- 81 27 99 % --   11/05/21 1300 125/65 -- -- 77 21 99 % --   11/05/21 1215 -- -- -- -- -- 99 % --   11/05/21 1200 (!) 151/75 98.6  F (37  C) Axillary 69 20 100 % --   11/05/21 1130 -- -- -- 61 -- 99 % --   11/05/21 1100 132/74 -- -- 66 17 100 % --   11/05/21 1030 -- -- -- 67 -- 98 % --   11/05/21 1020 133/67 -- -- 65 -- 99 % --   11/05/21 1010 115/65 -- -- 66 -- 95 % --   11/05/21 1000 133/67 -- -- 67 16 97 % --   11/05/21 0950 120/71 -- -- 71 18 95 % --   11/05/21 0940 (!) 144/71 -- -- 73 20 93 % --        Gen: Awake and in no acute  distress  Resp: non labored breathing, trach doming, minimal dry blood around trach  Abd: PEGJ site looks well, no evidence of infection       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 We were consulted to place a PEGJ on 10/29       - attempted to advance tube with reglan and repeat abdominal cxr yesterday, this attempt failed  - Discussed with IR regarding having them reposition tube, informed they are unable to do this as the site is still too new  - Will discuss with the attending regarding possibility of repositioning the tube   - Patient currently on TF at 20  - G port to gravity  - Rest of care per primary team      Seen with thoracic fellow, who will discuss with staff.    Vincent Maravilla MD  Surgery PGY1  Pager [phone]

## 2021-11-06 NOTE — PROGRESS NOTES
[facility]    Stroke Progress Note    Interval Events- Continued to have better attention and interaction over the last 24 hours.   - Neurological examination much improved today since previous encounter.  - Arrested on Tuesday 11/2 2/2 hypotension and inability to feel a pulse.  CPR and pressors given.  Stabilized.     HPI Summary  Edson Thornton is a 56 year old male with with Afib, HTN, RA, SALTY, and ILD s/p lung transplant 10/16/21.  Per primary team he has had significant difficulty with ventilation required paralytics and sedation, however, he has typically moved all his extremities with full strength.  It was noted on 10/21 that he was no longer moving his lower extremities.  Stroke code activated on morning of 10/22 for persistent neurologic change.  CT and Brain MRI showed subacute bilateral cortical infarcts and left cerebellar infarct.  Neurologic exam improved on 10/30 s/p trach/peg procedure.  Pt has slow improvement over the last 2-3 days in terms of interaction and attention.     Stroke Evaluation Summarized    MRI/Head CT MRI Brain (10/26/2021):  Impression:  1. Evolving acute age multifocal acute infarctions in both cerebral  hemispheres and left cerebellum. No new areas of infarction when  compared with prior MRI brain on 10/23/2021.  2. Minimally increased punctate regions of susceptibility effect  within areas of infarction, corresponding to petechial hemorrhage  within previously identified multifocal acute infarctions.  3. Head MRA demonstrates no definite aneurysm or stenosis of the major  intracranial arteries.  4. Neck MRA demonstrates patent major cervical arteries.    MRI Brain (10/23/2021):  Impression:  Multifocal subacute infarcts within both cerebral hemispheres and left cerebellum all of which appear present on prior head CT 10/22/2021.  Minimal petechial hemorrhage associated with the infarct in the left occipital lobe.     Head CT  (10/22/2021) : multiple scattered subacute ischemic strokes, predating exam change from 10/22.  May contribute to encephalopathy, but would not explain lack of extremity movement.     Head CT (10/25/2021): This exam is significantly limited by motion and streak artifact. L posterior parietal lobe acute/subacute infarct is identified on current exam w/ remaining previously seen infarct possibly obscured by artifact. No CT evidence for new cortical hypoattentuation to indicate a new acute infarct. No definite acute intracranial hemorrhage or midline shift     Intracranial Vasculature   Head CTA (10/25/2021): No intracranial arterial large vessel occlusion or significant stenosis/occlusion    Head CTA (10/22/2021): demonstrates no aneurysm or stenosis of major intracranial arteries.    CT Perfusion (10/22/2021): focally decreased cerebral blood volume and cerebral blood flow in the posterior most left parieto-occipital region with corresponding slight increase in TTP possibly representing an area of hypoperfusion or core infarct.      Cervical Vasculature   CTA Neck (10/22/2021): Mild stenosis in the R vertebral artery w/ severe high-grade stenosis at the L vertebral artery origin    CTA Neck (10/22/2021): No stenosis of major cervical arteries     Echocardiogram TC reported no valvular vegetations and no pulmonary vein thrombosis per primary team.     EKG/Telemetry   Sinus tachycardia   Otherwise normal ECG   When compared with ECG of 21-OCT-2021 06:50,   ST no longer depressed in Anterior leads   ST no longer elevated in Lateral leads      Other Testing   EEG showed no epileptiform discharges x2.  Positive for diffuse encephalopathy x2.      LDL  No lab value available in past 30 days   A1C  No lab value available in past 30 days   Troponin No lab value available in past 48 hrs       Impression     # Acute to subacute ischemic stroke of bilateral cerebral hemispheres and left cerebellum with suspected cardioembolic  etiology  # Encephalopathy with Lack of Reactivity on Physical Exam    Pt's initial stroke code was called on 10/22 for change in physical exam. Per nursing report, the pt had previously been moving all extremities to the point of requiring restraints. However, pt suddenly stopped moving altogether on 10/21 ~7PM. No Stroke Code was called until the next morning. CTH revealed multiple likely subacute ischemic infarcts in the bilateral cerebral hemispheres and L cerebellar hemisphere while CTA Head/Neck was unrevealing. Also performed a CT Perfusion (10/22) which noted similar findings. At the time, sustained clonus was noted on exam, raising concerns for cervical myelopathy. However, this improved on subsequent exams and CT C-spine was unremarkable. The remainder of the stroke work-up was completed w/ MRI Brain again showing similar finding to CT images. At this time, raised concern that pt's noted strokes did not correspond well to his current deficits and that these were unlikely to be the cause of his change in physical exam. Obtained an EEG which only revealed diffuse encephalopathy. Pt was noted to have multiple metabolic/toxic derangements and so these were seen as the likely culprits. Has since been found to have an BRENNAN and Candidemia.    A second stroke code was called on 10/25 again for decreased reactivity on exam. Repeat CTH and CTA Head/Neck during the code were unrevealing. Exam appears mildly improved today but still seems too dense to be solely metabolic abnormalities and ongoing hypoxic/hypercapneic respiratory failure. Infection is highly suspect as he has begun to spike recurrent fevers. Per nursing report, his fevers broke on the evening of 10/27 and the pt has been afebrile sine. These fevers persisted despite empiric antibiotics, antivirals, and he is now on antifungals. Primary team in the process of determining source of infection. Also of concern is increased stroke burden not seen on CT  imaging or potential for a broader cortical damage caused by something such as anoxic brain injury or the potential like PRES. Repeat MRI with addendum stated that original known infarcts are progressing as expected; the findings on repeat MRI are unlikely to be associated with PRES.     Neurology was called again on 11/6 due to discrepancies in documentation and concern for new defects involving immobility of upper extremities.  Initially after his strokes, the pt was not moving his upper extremities to noxious stimuli.  On exam today, he continues to not move his arms or withdraw from noxious stimuli in distal upper extremities, but does grimace to painful stimuli near axillae as well as bilateral lower extremities.  He is able to follow basic commands such as wiggling of toes and sticking out his tongue.  He also nods and shakes his head to yes or no questions with repeated prompting.  A repeat head CT was ordered yesterday 11/5 which showed no new hypodensities and shows his previous infarcts progressing expectedly.  Overall, since the last encounter on 10/31, his neurologic examination has much improved.      Recommendations:  - Delirium precautions  - Limit sedation and CNS acting drugs  - No need for repeat MRI.  Potential anoxic brain injury from arrest on 11/2, however, neurologic exam much improved despite this.  Management in terms of stroke burden would not change depending on repeat imaging.     Vascular Neurology will sign off at this time; Please consult neurocrit if questions arise about encephalopathy, and please consult vascular if concerns arise about stroke    This patient was staffed with the attending stroke physician, Dr. Opal Hendrickson.    Vincent Knox,   Neurology Resident, PGY-1  Ph: [phone]    11/06/2021    ______________________________________________________    Clinically Significant Risk Factors Present on Admission                  Medications   Scheduled Meds    acetylcysteine  2 mL  Nebulization 4x Daily     acyclovir  400 mg Oral or J tube BID     amiodarone  200 mg Oral or Feeding Tube Daily     calcium carbonate 600 mg-vitamin D 400 units  1 tablet Oral or Feeding Tube BID w/meals     fluconazole  400 mg Intravenous Q24H     heparin lock flush  5-20 mL Intracatheter Q24H     [Held by provider] insulin glargine  40 Units Subcutaneous QAM AC     levalbuterol  1.25 mg Nebulization 4x Daily     levothyroxine  25 mcg Oral Daily     lidocaine  2 patch Transdermal Q24H     lidocaine   Transdermal Q8H     metoclopramide  10 mg Intravenous Q8H     metoprolol tartrate  50 mg Oral or Feeding Tube BID     multivitamins w/minerals  15 mL Per Feeding Tube Daily     mycophenolate  1,000 mg Per J Tube BID     nystatin  1,000,000 Units Swish & Swallow 4x Daily     pantoprazole  40 mg Per Feeding Tube BID     [START ON 11/7/2021] prednisoLONE  12.5 mg Per J Tube 2 times daily     prednisoLONE  12.5 mg Per J Tube QPM     [Held by provider] rosuvastatin  10 mg Oral or Feeding Tube Daily     sodium chloride (PF)  10-40 mL Intracatheter Q8H     sodium chloride (PF)  3 mL Intracatheter Q8H     sulfamethoxazole-trimethoprim  10 mL Oral or J tube Daily    Or     sulfamethoxazole-trimethoprim  1 tablet Oral or Feeding Tube Daily     tacrolimus  1 mg Sublingual QAM     tacrolimus  1 mg Sublingual QPM       Infusion Meds    dextrose 30 mL/hr at 11/02/21 2000     dextrose Stopped (10/24/21 1008)     heparin 650 Units/hr (11/06/21 1100)     insulin regular 2 Units/hr (11/06/21 1100)     BETA BLOCKER NOT PRESCRIBED         PRN Meds  acetaminophen, bisacodyl, dextrose, dextrose, glucose **OR** dextrose **OR** glucagon, diphenhydrAMINE **OR** diphenhydrAMINE, heparin lock flush, labetalol, lidocaine 4%, lidocaine, lidocaine (buffered or not buffered), loperamide, magnesium hydroxide, naloxone **OR** naloxone **OR** naloxone **OR** naloxone, ondansetron **OR** ondansetron, oxymetazoline, prochlorperazine **OR**  "prochlorperazine, BETA BLOCKER NOT PRESCRIBED, senna-docusate, sodium chloride (PF), sodium chloride (PF), sodium chloride (PF)       PHYSICAL EXAMINATION  Temp:  [98.1  F (36.7  C)-99  F (37.2  C)] 98.9  F (37.2  C)  Pulse:  [] 75  Resp:  [15-31] 18  BP: (111-160)/(60-94) 127/74  MAP:  [82 mmHg-104 mmHg] 91 mmHg  Arterial Line BP: (101-156)/(61-82) 146/64  FiO2 (%):  [40 %] 40 %  SpO2:  [96 %-100 %] 99 %      General: No sedation, aroused by loud voice and noxious   HEENT: Normocephalic, atraumatic, no epistaxis, no oral lesions, MMM  Resp: No increased work of breathing  Cardio: RRR, S1/S2 appropriate   Abdomen:  Soft, non-distended, non-tender  Extremities: Warm and well perfused, peripheral pulses present, moderate edema  Skin: Not jaundiced, no rash, scattered ecchymoses  Neuro:  Mental status: Lethargic, aroused by loud voice and noxious. Does open his eyes to his name.  He can follow commands repeatedly such as wiggling his toes and sticking out his tongue.  Able to nod and shake his head to yes/no questions. Speech fluency, comprehension and repetition are all impaired.   Cranial nerves: Eyes conjugate, PERRL, EOMI, no blink to threat, face symmetric, hearing intact to conversation. Sticks his tongue out to the midline.  Remainder of cranial nerves unable to be assessed due to mental status.   Motor: Tone normal with some degree of paratonia. Does not move upper extremities.  Tone flaccid.  Able to wiggle toes in bilateral lower extremities equally.  No abnormal movements noted (no myoclonus).  Reflexes:  Toes down-going.  Sensory: Withdraws to noxious stimuli equally in bilateral lower extremities.  Grimaces to noxious stimuli in axillae.  Does not react to painful stimuli in distal b/l upper extremities.  Shakes his head and mouths \"no\" when asked if he can feel anything in b/l UE.  Coordination: Unable to adequately assess due to clinical condition  Gait: Unable to assess due to clinical condition. "     Stroke Scales    Imaging  I personally reviewed all imaging; relevant findings per HPI.     Lab Results Data   CBC  Recent Labs   Lab 11/06/21 0438 11/05/21  1624 11/05/21 0337   WBC 19.7* 17.5* 18.5*   RBC 3.21* 2.95* 2.99*   HGB 9.5* 9.0* 8.9*   HCT 30.4* 28.4* 28.3*    161 161     Basic Metabolic Panel    Recent Labs   Lab 11/06/21  1007 11/06/21  0808 11/06/21  0653 11/06/21  0557 11/06/21 0438 11/05/21  2355 11/05/21  1624 11/05/21  0508 11/05/21 0337   NA  --   --   --   --  144  144  --  150*  --  151*   POTASSIUM  --   --   --   --  3.9  3.9  --  3.7  --  4.1   CHLORIDE  --   --   --   --  114*  114*  --  118*  --  123*   CO2  --   --   --   --  24  24  --  23  --  24   BUN  --   --   --   --  36*  36*  --  44*  --  60*   CR  --   --   --   --  0.62*  0.62*  --  0.65*  --  0.71   * 141* 160*   < > 190*  190*   < > 103*   < > 130*   ERIK  --   --   --   --  8.4*  8.4*  --  8.2*  --  8.2*    < > = values in this interval not displayed.     Liver Panel  Recent Labs   Lab 11/06/21 0438 11/05/21 0337 11/04/21 0339   PROTTOTAL 5.1* 4.9* 4.8*   ALBUMIN 1.9* 1.8* 1.7*   BILITOTAL 0.5 0.5 0.4   ALKPHOS 149 130 129   AST 42 59* 117*   * 226* 262*     INR    Recent Labs   Lab Test 11/06/21 0438 11/05/21 0337 11/04/21 0339   INR 1.26* 1.27* 1.32*      Lipid Profile    Recent Labs   Lab Test 10/22/21  0407 10/21/21  0354 10/20/21  0308 10/19/21  0338 09/07/21  1324   CHOL  --   --   --   --  214*   HDL  --   --   --   --  51   LDL  --   --   --   --  130*   TRIG 307* 486* 383*   < > 364*    < > = values in this interval not displayed.     A1C    Recent Labs   Lab Test 09/07/21  0928 09/05/21  1901   A1C 6.6* 6.5*     Troponin I    No results for input(s): TROPONIN, GHTROP in the last 168 hours.

## 2021-11-06 NOTE — PLAN OF CARE
Major Shift Events: Neuro status unchanged. PERRLA, able to intermittently move toes on commands, opens eyes spontaneously, did not move arms on command, but did move them per self at least once. NSR-ST on telemetry. BP slightly elevated and 1 dose IV labetalol given overnight. Pt placed back on full vent settings at 2000. Shiley 6 trach in place. Pt has persistent cough with thick secretions. Adequate UOP via metzger. No stool from rectal tube. Turned and repositioned q2h overnight. TF turned up to 20. MD said not to go past 20ml/hr. Insulin gtt restarted this AM.     Plan: Continue to PS as tolerated during the day. Work with therapy.     For vital signs and complete assessments, please see documentation flowsheets.

## 2021-11-06 NOTE — PROGRESS NOTES
Pulmonary Medicine  Cystic Fibrosis - Lung Transplant Team  Progress Note  2021       Patient: Edson Thornton  MRN: [id number]  : 1965 (age 56 year old)  Transplant: 10/16/2021 (Lung), POD#21)  Admission date: 2021    Assessment & Plan:     Edson Thornton is a 56 year old male with a PMH significant for NSIP/ILD, bronchiectasis, moderate PH, RA, SALTY, chronic HSV infection, hypogammaglobulinemia, steroid-induced diabetes, hypothyroidism, PFO, HTN, HLD, duodenal anomaly, anxiety, and depression.  Admitted on 21 from OSH for acute on chronic respiratory failure 2/2 ILD exacerbation, now s/p BSLT on 10/16/21. Surgery relatively uncomplicated but pt. with low cardiac index and PGD immediately post-op.  Caroline weaned off 10/22, remains intubated, s/p trach and PEG/J tube placement with thoracic surgery 10/29.  Post-op course complicated by encephalopathy and diffuse weakness, acute to subacute CVA, afib with RVR, BRENNAN, GI bleed due to NJ/OG trauma, Candidemia/Candida empyema, and recurrent fevers.  Neuro status remains tenuous, although some improvement noted since 10/29.  Code called  due to bradycardia/asystole (required 1 round of CPR, no medications) and then progressively hypotensive, noted to have acute drop in hemoglobin and bloody G tube output.  EGD with large amount of clotted blood in stomach and area of raised mucosa near PEG tube site that was clipped, no active bleeding.     Today's recommendations:   - PS trials as tolerated  - Diuresis per ICU team  - CXR daily with left chest tube in place  - Tacrolimus level today was 8.6. Will change back to per tube to 2mg qAM, 1mg qPM.   daily levels (ordered)  - Prednisolone taper due tomorrow  (ordered)  - Continue to hold Bactrim due to Cr/potassium trend, revisit in 1-2 days  - CMV and EBV ordered   - Continue acyclovir through POD #30  - Coccidioides Ag ordered   - IgG ordered   - DSA ordered 11/15  - PHS high  risk donor risk labs ordered 11/15     S/p BSLT for ILD:  Acute hypoxic respiratory failure s/p trach:   Pulmonary edema:  Bilateral pleural effusions: Unfortunately had not received vaccination for flu, PNA, or COVID-19 PTA.  Explant pathology with NSIP, no malignancy.  PGD 2-3.  Weaned off paralytic 10/19 (for vent dyssynchrony) and Caroline 10/22.  Initial difficulty weaning sedation given agitation then with neurological findings as below.  CXR initially post-op with pulmonary edema, aggressively diuresed.  Recurring fevers post-op, see ID section below.  Most recent bronch 10/30 with thick secretions in the RUL and LLL and mild mucosal erythema, bilateral anastomoses intact.  Remains intubated with persistent encephalopathy as below, s/p trach and PEG/J tube placement with thoracic surgery 10/29.  Had been intermittent tolerating PS trials.  Code called 11/2 for bradycardia/asystole (required 1 round of CPR, no medications) then progressively hypotensive, GI bleed as below.  Chest CT 11/2 with increased bilateral pleural effusions (moderate left, small right), bibasilar atelectasis with area of hypoenhancing parenchyma in LLL (suspicious for infection), and numerous nondisplaced anterior rib fractures bilaterally.  S/p left chest tube placement in IR 11/3, right deferred given very little effusion on US.  - Nebs: levalbuterol and Mucomyst QID  - Aggressive pulmonary toilet with chest physiotherapy QID  - Ventilator management per ICU team, PS trials as tolerated  - Volume management per nephrology and ICU team  - CXR daily. Today's study (personally reviewed) shows mild increase in basilar layered opacity, peripheral consolidation at the LLL.   - Defer bronch today, revisit prn     Immunosuppression: s/p induction therapy with basiliximab 10/16 (and high dose IV steroid) and 10/20  - Tacrolimus SL 0.5 mg qAM / 1 mg qPM (11/2, changed to SL given GI bleed).  Goal level 8-12.  Level 11/4 therapeutic at 10 (10h  level), no dose adjustment, daily levels (ordered).  - MMF 1000 mg BID (11/2, decreased given GI bleed, AZA to be avoided given TPMT)  - Prednisolone 15 mg qAM / 12.5 mg qPM, next taper due 11/7 (ordered)  Date AM dose (mg) PM dose (mg)   10/31/21 15 12.5   11/7/21 12.5 12.5   11/21/21 12.5 10   12/5/21 10 10   1/2/22 10 7.5   1/30/22 7.5 7.5   2/27/22 7.5 5   3/27/22 5 5   4/24/22 5 2.5      Prophylaxis:   - Bactrim briefly held due to BRENNAN, resumed 11/6.  - Nystatin for oral candidiasis ppx, 6 month course  - See below for serologies and viral ppx:    Donor Recipient Intervention   CMV status Negative Negative None, CMV monthly (ordered 11/16)   EBV status Positive Positive None, EBV monthly (ordered 11/16)   HSV status N/A Positive Acyclovir POD #1-30   (recent infection history pre-txp)      ID: Concern for possible Strongyloides exposure pre-transplant s/p ivermectin x1 dose (9/17).  Donor and recipient cultures NGTD.  S/p IV ceftazidime/vancomycin for 48h per protocol and additional empiric ceftazidime 10/19-10/23 given recurrent fevers.  Bronch cultures 10/17 with Staph epi.  Cryptococcal Ag negative 10/23.    - Monthly Coccidioides Ag x12 months post-transplant per ID (urine and serum negative 10/17), due 11/17 (ordered)     Recurring fevers:  Fevers post-op, Tmax 101.7 POD #1.  Febrile with worsening leukocytosis again 10/25, generally persisting.  Trach sputum culture (10/25) with GPC, normal jean marie.  LP (10/29), xanthochromic with pleocytosis thought to be appropriate given RBC and WBCs, no ABX recommended per transplant ID and neurology.  Bronch culture (10/30) with GPC in clusters, normal jean marie.  S/p empiric meropenem 10/28-11/2.  - Trach sputum culture (11/2) with GPC, follow     Disseminated Candida: Noted on blood cultures 10/20 and 10/22.  BDG fungitell positive (399) on 10/20.  Respiratory cultures with persistent Candida albicans.  TC 10/23 without evidence of endocarditis.  Ophthalmology consult  10/24 with benign dilated fundoscopic exam.  Candida empyema also noted 10/25, chest tubes inadvertently removed by CVTS 10/28, left chest tube replaced by IR 11/3 as above.  - Repeat blood cultures (10/23, 10/25, 11/2) NGTD   - Pleural cultures (11/3) Candida albicans; Cytology - acute inflammation, negative for malignancy   - Fluconazole (10/26, prior micafungin 10/22-10/27), through 11/25 per TID.     HSV: Chronic intermittent active infection pre-transplant with recent HSV infection: crusted lesions throughout left side of jaw, s/p 10 day treatment course of ACV through 10/9.  HSV PCR blood negative 10/17.  - ACV ppx as above (started POD #1 instead of POD #8 given HSV history and location)     Hypogammaglobulinemia: IgG previously low at 364 (9/7).  Noted at 265 at time of transplant, s/p IVIG with premedication 10/21.    - Repeat IgG 11/17 (ordered)     Positive cross match: Note that he received two doses of rituximab in June, which is likely contributing to cross match result.  DSA most recently negative 11/1.  - DSA monitoring q2 weeks (11/15, ordered)     PHS risk criteria donor:  Additional labs required post-transplant (between 4-8 weeks post-op): Hepatitis B, Hepatitis C, and HIV by VISHAL (ordered 11/15).     SALTY: Noted pre-transplant.  Home CPAP 6-12 cm H2O.  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  MRI (10/23) with multifocal subacute infarcts within both cerebral hemispheres and left cerebellum.  EEG without seizures 10/22, ammonia normal.  DDx include surgery v embolic v infectious (see above regarding ID work up).  Heparin drip started 10/23.   Repeat stroke code 10/25 with marked  decrease in responsiveness with sedation wean.  Pupils not equal (3 mm R, 2 mm L with extropia) and gag reflex initially absent.  CTA head without obvious new pathology, MRI brain (with and without contrast) primarily revealing for infarct, low likelihood of PRES.  VEEG per neuro with severe diffuse encephalopathy.  Etiology of CVA likely 2/2 afib, PFO, or perioperative.  Some improvement noted since 10/29, repeat head CT 11/2 (following code) without new acute intracranial abnormalities.     Afib with RVR: Noted 10/18, started on amiodarone drip and transitioned to PO 10/21, dose decreased 10/29 (anticipate 4 week course).  Currently in SR.  Heparin drip as above.  Metoprolol resumed 11/3.     BRENNAN, Improving:   Hyperkalemia: Baseline creatinine 0.7.  BRENNAN noted POD #1, UO also downtrending.  Improved with aggressive diuresis, Cr worsened again (1.38 on 11/2) along with up trending BUN and hyperkalemia.  Nephrology consulted 11/1, thought to be prerenal/component of ATN.  - Holding Bactrim as above  - Management per nephrology and primary team     Recurrent GI bleed: Hemoglobin dropped to 6.6 10/22, s/p 2 units pRBC.  OG tube with bloody output, EGD 10/23 noted NJ/OG tube trauma with scant oozing.  IV PPI BID.  Progressive hypotensive 11/2, hemoglobin 5.8 with bloody G tube output.  Heparin drip held, transitioned to PPI drip, and MTP activated.  EGD 11/2 with large amount of clotted blood in stomach and area of raised mucosa with small adherent clot near PEG tube site that was clipped, no active bleeding.  Abdominal CT 11/2 with stomach distended with hyperdense fluid suggestive of blood products and dilated small bowel.  Maroon stools 11/3, repeat EGD with extensive old blood in stomach, no active bleeding, small nodular area with prior clips clipped again. Repeat EGD (11/5) with ulcer noted at PEG-bumper site, gastritis, and suction marks from G tube, and G-J tube is coiled in the duodenum.   - Regral q8h x 24h,  "repeat AXR.  G tube to gravity drainage.          Right subclavian DVT: Seen on UE US from 11/4. Low-intensity heparin was re-started on 11/6 no no further signs of bleeding.      Elevated LFTs: Shock liver post-op.  Initial ALT//230 evening of surgery, then with marked increase to 1550/1246 POD #1.  Had improved although now with acute rise again likely in setting of shock, again improving.     We appreciate the excellent care provided by the CVICU and CVTS teams.  Recommendations communicated via in person rounding and this note.  Will continue to follow along closely, please do not hesitate to call with any questions or concerns.    Jody Sifuentes MD [facility]     Subjective & Interval History:     No major events overnight. S/p repeat EGD yesterday, tip of J tube was noted to be coiled in the duodenum.  Neuro examination overall unchanged, moving his LUE this morning which was noted on previous exam but new to me.      Review of Systems:     Please see HPI, otherwise the complete 10 point ROS is negative.     Physical Exam:     Vital signs:  Temp: 98.9  F (37.2  C) Temp src: Axillary BP: (!) 159/94 Pulse: 96   Resp: 17 SpO2: 100 % O2 Device: Mechanical Ventilator Oxygen Delivery: 15 LPM Height: 162.6 cm (5' 4\") Weight: 71 kg (156 lb 8.4 oz)  I/O:     Intake/Output Summary (Last 24 hours) at 11/6/2021 0857  Last data filed at 11/6/2021 0800  Gross per 24 hour   Intake 3625.13 ml   Output 2140 ml   Net 1485.13 ml       GENERAL: NAD  HEENT: NCAT, EOMI, no scleral icterus, oral mucosa moist  Lungs: good air flow, no crackles, rhonchi or wheezing;  Bivona #6 trach in place  CV: RRR, S1S2, no murmurs noted  Abdomen: normoactive BS, soft, non tender  Neuro: Opens eyes to name, did not follow command for me this AM. Moved LUE spontaneously  Psychiatric: unable to assess  Skin: no rash, jaundice or lesions on limited exam  Extremities: No clubbing, cyanosis or edema.      Lines, Drains, and Devices:  Peripheral IV " 10/25/21 Right;Anterior;Lateral Lower forearm (Active)   Site Assessment WDL 11/06/21 0400   Line Status Infusing;Checked every 1-2 hour 11/06/21 0400   Dressing Intervention Dressing reinforced 11/05/21 0400   Phlebitis Scale 0-->no symptoms 11/06/21 0400   Infiltration Scale 0 11/06/21 0400   Infiltration Site Treatment Method  None 11/05/21 0400   If infiltrated, was a vesicant infusing? No 10/30/21 0400   Number of days: 12       Peripheral IV 10/25/21 Anterior;Distal;Right Upper arm (Active)   Site Assessment WDL 11/06/21 0400   Line Status Infusing;Checked every 1-2 hour 11/06/21 0400   Dressing Intervention Dressing reinforced 11/05/21 0400   Phlebitis Scale 0-->no symptoms 11/06/21 0400   Infiltration Scale 0 11/06/21 0400   Infiltration Site Treatment Method  None 11/05/21 0400   If infiltrated, was a vesicant infusing? No 10/30/21 0400   Number of days: 12       Peripheral IV 11/02/21 Anterior;Distal;Right Lower forearm (Active)   Site Assessment WDL 11/06/21 0400   Line Status Saline locked 11/06/21 0400   Phlebitis Scale 0-->no symptoms 11/06/21 0400   Infiltration Scale 0 11/06/21 0400   Infiltration Site Treatment Method  None 11/05/21 0400   Number of days: 4       Peripheral IV 11/02/21 Anterior;Right Upper forearm (Active)   Site Assessment WDL 11/06/21 0400   Line Status Saline locked 11/06/21 0400   Phlebitis Scale 0-->no symptoms 11/06/21 0400   Infiltration Scale 0 11/06/21 0400   Infiltration Site Treatment Method  None 11/05/21 0400   Number of days: 4       PICC Triple Lumen 11/04/21 Left Basilic Access. PICC okay to use. (Active)   Site Assessment WDL 11/06/21 0400   External Cath Length (cm) 1 cm 11/04/21 1747   Extremity Circumference (cm) 28 cm 11/04/21 1747   Dressing Intervention Transparent;Chlorhexidine patch 11/06/21 0400   Dressing Change Due 11/08/21 11/05/21 2000   Mosley - Status heparin locked 11/06/21 0400   Gray - Cap Change Due 11/09/21 11/05/21 2000   Red - Status heparin  locked 11/06/21 0400   Red - Cap Change Due 11/09/21 11/05/21 2000   White - Status infusing 11/06/21 0400   White - Cap Change Due 11/09/21 11/05/21 2000   Extravasation? No 11/05/21 2000   Line Necessity Yes, meets criteria 11/06/21 0400   Number of days: 2       Arterial Line 11/02/21 Radial (Active)   Site Assessment WDL Except;Bleeding 11/06/21 0400   Line Status Positional 11/06/21 0400   Arterine Line Cap Change Due 11/09/21 11/05/21 2000   Art Line Waveform Appropriate 11/06/21 0400   Art Line Interventions Leveled;Connections checked and tightened;Flushed per protocol 11/06/21 0400   Color/Movement/Sensation Capillary refill less than 3 sec 11/06/21 0400   Line Necessity Yes, meets criteria 11/06/21 0400   Dressing Type Transparent 11/06/21 0400   Dressing Status New drainage 11/06/21 0400   Dressing Intervention Dressing reinforced 11/05/21 0400   Dressing Change Due 11/08/21 11/06/21 0400   Number of days: 4     Data:     LABS    CMP:   Recent Labs   Lab 11/06/21  0808 11/06/21  0653 11/06/21  0557 11/06/21  0438 11/05/21  2355 11/05/21  1624 11/05/21  0508 11/05/21  0337 11/04/21  1019 11/04/21  1015 11/04/21  0351 11/04/21  0339 11/03/21  0411 11/03/21  0407   NA  --   --   --  144  144  --  150*  --  151*  --  151*   < > 148*  148*   < > 148*   POTASSIUM  --   --   --  3.9  3.9  --  3.7  --  4.1  --  3.5   < > 4.0  4.0   < > 5.3   CHLORIDE  --   --   --  114*  114*  --  118*  --  123*  --  121*   < > 121*  121*   < > 116*   CO2  --   --   --  24  24  --  23  --  24  --  26   < > 23  23   < > 27   ANIONGAP  --   --   --  6  6  --  9  --  4  --  4   < > 4  4   < > 5   * 160* 162* 190*  190*   < > 103*   < > 130*   < > 169*   < > 138*  138*   < > 138*   BUN  --   --   --  36*  36*  --  44*  --  60*  --  80*   < > 86*  86*   < > 109*   CR  --   --   --  0.62*  0.62*  --  0.65*  --  0.71  --  0.88   < > 0.88  0.88   < > 1.27*   GFRESTIMATED  --   --   --  >90  >90  --  >90  --  >90   --  >90   < > >90  >90   < > 63   ERIK  --   --   --  8.4*  8.4*  --  8.2*  --  8.2*  --  8.7   < > 8.7  8.7   < > 9.4   MAG  --   --   --  1.6  --   --   --  1.9  --   --   --  2.0  --  2.1   PHOS  --   --   --  2.9  --   --   --  3.0  --   --   --  3.7  --  4.4   PROTTOTAL  --   --   --  5.1*  --   --   --  4.9*  --   --   --  4.8*  --  4.8*   ALBUMIN  --   --   --  1.9*  --   --   --  1.8*  --   --   --  1.7*  --  1.8*   BILITOTAL  --   --   --  0.5  --   --   --  0.5  --   --   --  0.4  --  0.5   ALKPHOS  --   --   --  149  --   --   --  130  --   --   --  129  --  146   AST  --   --   --  42  --   --   --  59*  --   --   --  117*  --  297*   ALT  --   --   --  176*  --   --   --  226*  --   --   --  262*  --  356*    < > = values in this interval not displayed.     CBC:   Recent Labs   Lab 11/06/21 0438 11/05/21  1624 11/05/21 0337 11/04/21  1816   WBC 19.7* 17.5* 18.5* 21.4*   RBC 3.21* 2.95* 2.99* 3.09*   HGB 9.5* 9.0* 8.9* 9.1*   HCT 30.4* 28.4* 28.3* 29.1*   MCV 95 96 95 94   MCH 29.6 30.5 29.8 29.4   MCHC 31.3* 31.7 31.4* 31.3*   RDW 18.0* 18.3* 18.6* 18.8*    161 161 182       INR:   Recent Labs   Lab 11/06/21 0438 11/05/21 0337 11/04/21  0339 11/03/21  0407   INR 1.26* 1.27* 1.32* 1.36*       Glucose:   Recent Labs   Lab 11/06/21  0808 11/06/21  0653 11/06/21  0557 11/06/21  0438 11/06/21  0437 11/05/21  2355   * 160* 162* 190*  190* 172* 117*       Blood Gas:   Recent Labs   Lab 11/06/21  0438 11/05/21  0504 11/05/21  0336 11/02/21  0826 11/02/21  0815   PHV  --   --   --   --  7.23*   PCO2V  --   --   --   --  53*   PO2V  --   --   --   --  25   HCO3V  --   --   --   --  22   LORI  --   --   --   --  -5.4   O2PER 40 40 40   < > 100    < > = values in this interval not displayed.       Culture Data No results for input(s): CULT in the last 168 hours.    Virology Data:   Lab Results   Component Value Date    FLUAH1 Negative 10/17/2021    FLUAH3 Negative 10/17/2021    MI6903 Negative  10/17/2021    IFLUB Negative 10/17/2021    RSVA Negative 10/17/2021    RSVB Negative 10/17/2021    PIV1 Negative 10/17/2021    PIV2 Negative 10/17/2021    PIV3 Negative 10/17/2021    HMPV Negative 10/17/2021    HRVS Negative 10/17/2021    ADVBE Negative 10/17/2021    ADVC Negative 10/17/2021       Historical CMV results (last 3 of prior testing):  Lab Results   Component Value Date    CMVQNT Not Detected 10/26/2021     No results found for: CMVLOG    Urine Studies    Recent Labs   Lab Test 11/01/21  1336 10/25/21  1507   URINEPH 5.5 5.5   NITRITE Negative Negative   LEUKEST Negative Negative   WBCU 0 2       Most Recent [facility] Pulmonary Function Testing (FVC/FEV1 only)  No results found for: 20002  No results found for: 20003  No results found for: 20015  No results found for: 20016    IMAGING    Recent Results (from the past 48 hour(s))   US Upper Extremity Venous Duplex Bilat   Result Value    Radiologist flags DVT (Urgent)    Narrative    EXAMINATION: DOPPLER VENOUS ULTRASOUND OF BILATERAL UPPER EXTREMTIES,  11/4/2021 10:59 AM     INDICATION: Evaluate for internal jugular clots and upper bilateral  extremity thrombus.    COMPARISON: Venous approximately ultrasound 10/19/2021    TECHNIQUE:  Gray-scale evaluation with compression, spectral flow, and  color Doppler assessment of the deep venous system of both upper  extremities.    FINDINGS:  There is nonocclusive thrombus noted in the right mid to medial  subclavian vein at the previous area of central line    Normal blood flow and waveforms are demonstrated in bilateral internal  jugular, innominate, axillary and left subclavian veins. There is  normal compressibility of the brachial, basilic and cephalic veins  bilaterally.      Impression    IMPRESSION:  Nonocclusive thrombus in the right mid to medial subclavian vein.    [Urgent Result: DVT]    Finding was identified on 11/4/2021 11:05 AM.     Dr. Pritchett was contacted by Dr. Ansari at 11/4/2021 11:09 AM  and  verbalized understanding of the urgent finding.     I have personally reviewed the examination and initial interpretation  and I agree with the findings.    ARIES DELGADO MD         SYSTEM ID:  [id number]   XR Abdomen Port 1 View    Narrative    EXAMINATION:  XR ABDOMEN PORT 1 VIEWS 11/4/2021 8:03 PM.    COMPARISON: 11/2/2021.    HISTORY:  Ileus    FINDINGS: Portable AP view of the abdomen and pelvis. Partly  visualized surgical changes of the chest. Left basilar chest tube.  Percutaneous gastrojejunostomy tube. Watson catheter noted.  Nonobstructive bowel gas pattern. No dilated loops of bowel. No  pneumatosis. Evaluation of free air is limited on this supine exam.  Bibasilar atelectasis.      Impression    IMPRESSION:   Nonobstructive bowel gas pattern.    I have personally reviewed the examination and initial interpretation  and I agree with the findings.    ARIES DELGADO MD         SYSTEM ID:  [id number]   XR Chest Port 1 View    Narrative    Exam: XR CHEST PORT 1 VIEW, 11/5/2021 12:43 AM    Indication: s/p lung transplant    Comparison: 11/4/2021    Findings:   Tracheostomy distal tip projects over the high thoracic trachea. There  is a left-sided chest tube in place. Postsurgical changes of lung  transplant similar to prior. Left PICC distal tip projects over the  low superior vena cava. Cardiac mediastinal silhouette is unchanged.  Small bilateral pleural effusions with hazy bibasilar atelectasis  similar to prior. No pneumothorax. No acute osseous abnormalities.  Partially visualized percutaneous gastrostomy tube in the left upper  quadrant.      Impression    Impression: Stable exam. Small bilateral pleural effusions with hazy  bibasilar atelectasis.    I have personally reviewed the examination and initial interpretation  and I agree with the findings.    KAMLA SHIN MD         SYSTEM ID:  [id number]   CT Head w/o Contrast    Narrative    CT HEAD W/O CONTRAST 11/5/2021 9:09 AM    History:  Mental status change, unknown cause     Comparison: 11/2/2021    Technique: Using multidetector thin collimation helical acquisition  technique, axial, coronal and sagittal CT images from the skull base  to the vertex were obtained without intravenous contrast.    Findings: There is no intracranial hemorrhage, mass effect, or midline  shift. Hypodense evolving subacute infarcts in the left occipital  lobe, right occipital lobe, bilateral parietal lobes are not  significantly changed. Bilateral serpiginous occipital parietal  cerebral hyperdensities, likely representing cortical laminar  necrosis. No new areas of infarction. No extra-axial fluid  collections. Proportionate size of the ventricles and sulci.  Gray-white matter differentiation is otherwise maintained. Basal  cisterns are clear. Normal noncontrast appearance of the major  cerebral vasculature. Slightly edematous. No bilateral mastoid air  cells. Paranasal sinuses are otherwise clear..     The bony calvaria and the bones of the skull base are normal.      Impression    Impression:  1. 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  2. No new acute intracranial findings.         I have personally reviewed the examination and initial interpretation  and I agree with the findings.    SUZI HOLDEN MD         SYSTEM ID:  [id number]   XR Abdomen Port 1 View    Narrative    EXAMINATION:  XR ABDOMEN PORT 1 VIEWS 11/5/2021 3:17 PM     COMPARISON: 11/4/2021    HISTORY: J tube placement post reglan    TECHNIQUE: Frontal view of the abdomen.    FINDINGS:     Devices: The enterostomy tube has advanced since the previous day's  radiograph and the tip now projects over the third part of the  duodenum.     No abnormally dilated loops of bowel. No pneumatosis or portal venous  gas.    Stable left pleural chest tube. Surgical changes of bilateral lung  transplant. Intact clamshell sternotomy wires.  Perihilar  and basilar  atelectasis in the visualized lung bases.      Impression    IMPRESSION:   1. Slight interval advancement of the enterostomy tube, the tip  projects over the third part of the duodenum.  2. Nonobstructive bowel gas pattern.    I have personally reviewed the examination and initial interpretation  and I agree with the findings.    JODI MENDEZ MD         SYSTEM ID:  [id number]   XR Chest Port 1 View    Narrative    Exam: XR CHEST PORT 1 VIEW, 11/6/2021 3:53 AM    Indication: s/p lung transplant    Comparison: 11/5/2021    Findings:   Tracheostomy unchanged. Left PICC unchanged. Posterior cervical chains  of lung transplant similar to prior with clamshell sternotomy wires.  There is a left basilar chest tube in place. Unchanged hazy bibasilar  opacities. No pneumothorax. Small bilateral pleural effusions.  Unchanged minimally displaced bilateral rib fractures.      Impression    Impression: Stable exam. Small bilateral pleural effusions with  adjacent atelectasis.    I have personally reviewed the examination and initial interpretation  and I agree with the findings.    MAURICIO NAVAS MD         SYSTEM ID:  [id number]

## 2021-11-06 NOTE — PROGRESS NOTES
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 Corral. 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  UGIB causing code blue on 11/2.     OVERNIGHT EVENTS:  naeo     TODAY'S PROGRESS:   D/w ID regarding cx - no indication to treat Staph epi from tracheal aspirate   ppi po bid  Electrolyte replacement    PLAN:  Neuro/ pain/ sedation:  Acute postoperative pain   Anxiety and depression  Acute to subacute CVA, b/l cerebral hemispheres and L cerebellum, suspect embolic  Encephalopathy and diffuse weakness - improving slightly   R ear lateral canal floor excoriation with bleeding - resolved   - Pain and sedation: none  - APAP and oxycodone prn, lido patch   - appreciate ENT  - appreciate neuro    Pulmonary care:   SALTY on home CPAP  Acute hypoxic respiratory failure s/p b/l lung transplant 10/16/21  S/p tracheostomy 10/29  Empyema with Candida s/p chest tube 11/3/21  - mechanically ventilated via trach, PST BID  - Levalbuterol nebs and Mucomyst, chest PT  - Daily CXR  - appreciate Pulmonary  - Diurese    Cardiovascular:    HTN  Afib   PFO  Vasoplegic shock in setting of hypovolemia - resolved  - Monitor hemodynamic status.   - MAP goal <100, SBP <140  - metoprolol 50 BID    - prn labetalol  - hold statin 2/2 elevated LFTs  - Amiodarone 200 mg daily   - low-intensity heparin gtt     GI care/ Nutrition:   Elevated LFTs - recurrent  GI bleed 2/2 NG trauma, now recurrent secondary to GJ bumper ulcer   Moderate malnutrition in the context of acute on chronic illness  PEG-J, with malpositioned J tube   - Diet: tf    - PPI bid    Renal/ Fluid Balance/ Electrolytes:   Acute kidney injury, recurrent, now with azotemia -  resolving   Hypernatremia  Hyperkalemia, resolved   BL creat appears to be ~ 0.7  - free water flushes  - Strict I/O, daily weights  - Avoid/limit nephrotoxins as able  - Replete lytes PRN per protocol     Endocrine:    Stress induced hyperglycemia with steroid-induced component, on DM2  Hypothyroidism  RA  Preop A1c 6.6  - insulin gtt   - Goal BG <180 for optimal healing  - continue home synthroid     ID/ Antibiotics:  Immunosuppression s/p transplant  Candidal infection of donor lungs (likely source), pleural fluid, and fungemia   - NGTD CSF. Last + blood cx 10/22. No vegetations on valves per TC 10/23  - Nystatin, Acyclovir, bactrim   - Tacrolimus (via g tube), prednisone    - appreciate transplant ID, ophthalmology    - d/w ID, no indication to treat Staph epi in sputum   - Fluconazole (end date 11/25)    Heme:     Acute blood loss anemia secondary to surgery and GI bleed, and anemia related to critical illness and iron deficiency    Hypogammaglobulinemia s/p IVIG  Thrombocytopenia, HIT negative - resolved   Lactic acidosis, resolved  Nonocclusive R subclavian thrombus    - iron studies consistent with iron deficiency, will not treat for now given infection  - heparin low intensity     MSK/ Skin:  Clamshell surgical incision  - Sternal precautions  - Postoperative incision management per protocol  - PT/OT/CR     Prophylaxis:    - Mechanical prophylaxis for DVT: SCDs  - Chemical DVT prophylaxis: heparin low intensity gtt    - PPI for 30 days postoperatively     Lines/ tubes/ drains:  - trach  - peg-j  - Watson 10/25  - PIVs  - chest tube L  - L PICC     Disposition:  - CVICU    Care conference to update family to be planned for early next week    Patient seen, findings and plan discussed with CVICU staff.     Moises Ribera  PGY-3 Anesthesiology      ====================================    SUBJECTIVE:   naeo    OBJECTIVE:   1. VITAL SIGNS:   Temp:  [36.7  C (98.1  F)-37.2  C (99  F)] 36.7  C (98.1  F)  Pulse:   [] 87  Resp:  [15-31] 18  BP: (111-160)/(60-93) 129/81  MAP:  [82 mmHg-107 mmHg] 86 mmHg  Arterial Line BP: (101-156)/(61-93) 132/67  FiO2 (%):  [40 %] 40 %  SpO2:  [92 %-100 %] 99 %  Ventilation Mode: CMV/AC  (Continuous Mandatory Ventilation/ Assist Control)  FiO2 (%): 40 %  Rate Set (breaths/minute): 12 breaths/min  Tidal Volume Set (mL): 400 mL  PEEP (cm H2O): 8 cmH2O  Pressure Support (cm H2O): 7 cmH2O  Oxygen Concentration (%): 40 %  Resp: 18    2. INTAKE/ OUTPUT:   I/O last 3 completed shifts:  In: 2519.17 [I.V.:939.17; NG/GT:1520]  Out: 2900 [Urine:2370; Emesis/NG output:400; Stool:20; Chest Tube:110]    3. PHYSICAL EXAMINATION:   General: NAD  Neuro: opens eyes to voice, wiggles toes BLE to command, no movement BUE to noxious, grimacing only to noxious of L hand and not to R - stable exam from yesterday    Resp: vented via trach, nonlabored   CV: RRR on tele   Abdomen: soft, nontender, rounded   Incisions: c/d/i  Extremities: warm and well perfused, no edema    4. INVESTIGATIONS:   Arterial Blood Gases   Recent Labs   Lab 11/06/21  0438 11/05/21  0504 11/05/21  0336 11/04/21  1016   PH 7.43 7.49* 7.49* 7.46*   PCO2 37 31* 32* 35   PO2 109* 126* 331* 95   HCO3 25 24 24 25     Complete Blood Count   Recent Labs   Lab 11/05/21  1624 11/05/21  0337 11/04/21  1816 11/04/21  0339   WBC 17.5* 18.5* 21.4* 25.1*   HGB 9.0* 8.9* 9.1* 9.1*    161 182 173     Basic Metabolic Panel  Recent Labs   Lab 11/06/21  0557 11/06/21  0438 11/06/21  0437 11/05/21  2355 11/05/21  1624 11/05/21  1156 11/05/21  0508 11/05/21  0337 11/04/21  1019 11/04/21  1015   NA  --  144  144  --   --  150*  --   --  151*  --  151*   POTASSIUM  --  3.9  3.9  --   --  3.7  --   --  4.1  --  3.5   CHLORIDE  --  114*  114*  --   --  118*  --   --  123*  --  121*   CO2  --  24  24  --   --  23  --   --  24  --  26   BUN  --  36*  36*  --   --  44*  --   --  60*  --  80*   CR  --  0.62*  0.62*  --   --  0.65*  --   --  0.71  --   0.88   * 190*  190* 172* 117* 103*   < >   < > 130*   < > 169*    < > = values in this interval not displayed.     Liver Function Tests  Recent Labs   Lab 11/06/21 0438 11/05/21 0337 11/04/21 0339 11/03/21  0407   AST 42 59* 117* 297*   * 226* 262* 356*   ALKPHOS 149 130 129 146   BILITOTAL 0.5 0.5 0.4 0.5   ALBUMIN 1.9* 1.8* 1.7* 1.8*   INR 1.26* 1.27* 1.32* 1.36*     Pancreatic Enzymes  No lab results found in last 7 days.  Coagulation Profile  Recent Labs   Lab 11/06/21 0438 11/05/21 0337 11/04/21  0339 11/03/21  0407 11/02/21  1053 11/02/21  1053 11/02/21  0927 11/02/21  0908 11/02/21  0832   INR 1.26* 1.27* 1.32* 1.36*   < > 1.55*  --    < > 1.38*   PTT  --   --   --   --   --  34 32  --  50*    < > = values in this interval not displayed.        =========================================

## 2021-11-06 NOTE — PLAN OF CARE
Major Shift Events:  neuro status improving, see neuro crit MD note. Pt moving L upper extremity at times but not to command. Will wiggle toes, open mouth and answer some questions by nodding or shaking head. G-tube to stay to gravity during while TF run to monitor for back up of tube feeds. Pts TF advanced to 30 ml/hr, MD would like to wait to advance further until tomorrow. Free water decreased to 50ml/2 hrs. Metoprolol increased due to hypertension, worked well. A line removed per MD order. Up in the chair for a few hours today. Pressure supported via vent x2 today for about 4 hours each time.    Plan: monitor neuro status, advance TF tomorrow, continue with pressure supporting.  For vital signs and complete assessments, please see documentation flowsheets.

## 2021-11-07 ENCOUNTER — APPOINTMENT (OUTPATIENT)
Dept: GENERAL RADIOLOGY | Facility: CLINIC | Age: 56
End: 2021-11-07
Attending: STUDENT IN AN ORGANIZED HEALTH CARE EDUCATION/TRAINING PROGRAM
Payer: COMMERCIAL

## 2021-11-07 LAB
ALBUMIN SERPL-MCNC: 1.8 G/DL (ref 3.4–5)
ALP SERPL-CCNC: 134 U/L (ref 40–150)
ALT SERPL W P-5'-P-CCNC: 107 U/L (ref 0–70)
ANION GAP SERPL CALCULATED.3IONS-SCNC: 3 MMOL/L (ref 3–14)
ANION GAP SERPL CALCULATED.3IONS-SCNC: 6 MMOL/L (ref 3–14)
ANION GAP SERPL CALCULATED.3IONS-SCNC: 6 MMOL/L (ref 3–14)
AST SERPL W P-5'-P-CCNC: 6 U/L (ref 0–45)
BACTERIA BLD CULT: NO GROWTH
BACTERIA BLD CULT: NO GROWTH
BASE EXCESS BLDV CALC-SCNC: 3.3 MMOL/L (ref -7.7–1.9)
BILIRUB SERPL-MCNC: 0.3 MG/DL (ref 0.2–1.3)
BUN SERPL-MCNC: 29 MG/DL (ref 7–30)
CALCIUM SERPL-MCNC: 8.3 MG/DL (ref 8.5–10.1)
CALCIUM SERPL-MCNC: 8.3 MG/DL (ref 8.5–10.1)
CALCIUM SERPL-MCNC: 8.4 MG/DL (ref 8.5–10.1)
CHLORIDE BLD-SCNC: 114 MMOL/L (ref 94–109)
CHLORIDE BLD-SCNC: 114 MMOL/L (ref 94–109)
CHLORIDE BLD-SCNC: 116 MMOL/L (ref 94–109)
CO2 SERPL-SCNC: 25 MMOL/L (ref 20–32)
CO2 SERPL-SCNC: 26 MMOL/L (ref 20–32)
CO2 SERPL-SCNC: 26 MMOL/L (ref 20–32)
CREAT SERPL-MCNC: 0.58 MG/DL (ref 0.66–1.25)
CREAT SERPL-MCNC: 0.58 MG/DL (ref 0.66–1.25)
CREAT SERPL-MCNC: 0.6 MG/DL (ref 0.66–1.25)
ERYTHROCYTE [DISTWIDTH] IN BLOOD BY AUTOMATED COUNT: 17.9 % (ref 10–15)
GFR SERPL CREATININE-BSD FRML MDRD: >90 ML/MIN/1.73M2
GLUCOSE BLD-MCNC: 113 MG/DL (ref 70–99)
GLUCOSE BLD-MCNC: 113 MG/DL (ref 70–99)
GLUCOSE BLD-MCNC: 139 MG/DL (ref 70–99)
GLUCOSE BLDC GLUCOMTR-MCNC: 108 MG/DL (ref 70–99)
GLUCOSE BLDC GLUCOMTR-MCNC: 113 MG/DL (ref 70–99)
GLUCOSE BLDC GLUCOMTR-MCNC: 114 MG/DL (ref 70–99)
GLUCOSE BLDC GLUCOMTR-MCNC: 117 MG/DL (ref 70–99)
GLUCOSE BLDC GLUCOMTR-MCNC: 125 MG/DL (ref 70–99)
GLUCOSE BLDC GLUCOMTR-MCNC: 132 MG/DL (ref 70–99)
GLUCOSE BLDC GLUCOMTR-MCNC: 133 MG/DL (ref 70–99)
GLUCOSE BLDC GLUCOMTR-MCNC: 135 MG/DL (ref 70–99)
GLUCOSE BLDC GLUCOMTR-MCNC: 138 MG/DL (ref 70–99)
GLUCOSE BLDC GLUCOMTR-MCNC: 140 MG/DL (ref 70–99)
GLUCOSE BLDC GLUCOMTR-MCNC: 140 MG/DL (ref 70–99)
GLUCOSE BLDC GLUCOMTR-MCNC: 143 MG/DL (ref 70–99)
GLUCOSE BLDC GLUCOMTR-MCNC: 144 MG/DL (ref 70–99)
GLUCOSE BLDC GLUCOMTR-MCNC: 150 MG/DL (ref 70–99)
GLUCOSE BLDC GLUCOMTR-MCNC: 158 MG/DL (ref 70–99)
GLUCOSE BLDC GLUCOMTR-MCNC: 159 MG/DL (ref 70–99)
GLUCOSE BLDC GLUCOMTR-MCNC: 163 MG/DL (ref 70–99)
GLUCOSE BLDC GLUCOMTR-MCNC: 165 MG/DL (ref 70–99)
GLUCOSE BLDC GLUCOMTR-MCNC: 187 MG/DL (ref 70–99)
GLUCOSE BLDC GLUCOMTR-MCNC: 94 MG/DL (ref 70–99)
GLUCOSE BLDC GLUCOMTR-MCNC: 99 MG/DL (ref 70–99)
HCO3 BLDV-SCNC: 28 MMOL/L (ref 21–28)
HCT VFR BLD AUTO: 27.8 % (ref 40–53)
HGB BLD-MCNC: 8.8 G/DL (ref 13.3–17.7)
INR PPP: 1.18 (ref 0.85–1.15)
MAGNESIUM SERPL-MCNC: 1.9 MG/DL (ref 1.6–2.3)
MCH RBC QN AUTO: 30.4 PG (ref 26.5–33)
MCHC RBC AUTO-ENTMCNC: 31.7 G/DL (ref 31.5–36.5)
MCV RBC AUTO: 96 FL (ref 78–100)
O2/TOTAL GAS SETTING VFR VENT: 40 %
PCO2 BLDV: 40 MM HG (ref 40–50)
PH BLDV: 7.45 [PH] (ref 7.32–7.43)
PHOSPHATE SERPL-MCNC: 2.3 MG/DL (ref 2.5–4.5)
PLATELET # BLD AUTO: 180 10E3/UL (ref 150–450)
PO2 BLDV: 38 MM HG (ref 25–47)
POTASSIUM BLD-SCNC: 3.5 MMOL/L (ref 3.4–5.3)
POTASSIUM BLD-SCNC: 3.5 MMOL/L (ref 3.4–5.3)
POTASSIUM BLD-SCNC: 4.2 MMOL/L (ref 3.4–5.3)
PROT SERPL-MCNC: 4.6 G/DL (ref 6.8–8.8)
RBC # BLD AUTO: 2.89 10E6/UL (ref 4.4–5.9)
SODIUM SERPL-SCNC: 144 MMOL/L (ref 133–144)
SODIUM SERPL-SCNC: 146 MMOL/L (ref 133–144)
SODIUM SERPL-SCNC: 146 MMOL/L (ref 133–144)
TACROLIMUS BLD-MCNC: 7 UG/L (ref 5–15)
TME LAST DOSE: NORMAL H
TME LAST DOSE: NORMAL H
UFH PPP CHRO-ACNC: 0.22 IU/ML
UFH PPP CHRO-ACNC: 0.3 IU/ML
UFH PPP CHRO-ACNC: 0.35 IU/ML
WBC # BLD AUTO: 13.4 10E3/UL (ref 4–11)

## 2021-11-07 PROCEDURE — 82040 ASSAY OF SERUM ALBUMIN: CPT | Performed by: STUDENT IN AN ORGANIZED HEALTH CARE EDUCATION/TRAINING PROGRAM

## 2021-11-07 PROCEDURE — 94640 AIRWAY INHALATION TREATMENT: CPT | Mod: 76

## 2021-11-07 PROCEDURE — 250N000009 HC RX 250: Performed by: STUDENT IN AN ORGANIZED HEALTH CARE EDUCATION/TRAINING PROGRAM

## 2021-11-07 PROCEDURE — 71045 X-RAY EXAM CHEST 1 VIEW: CPT

## 2021-11-07 PROCEDURE — 250N000013 HC RX MED GY IP 250 OP 250 PS 637

## 2021-11-07 PROCEDURE — 250N000013 HC RX MED GY IP 250 OP 250 PS 637: Performed by: STUDENT IN AN ORGANIZED HEALTH CARE EDUCATION/TRAINING PROGRAM

## 2021-11-07 PROCEDURE — 80197 ASSAY OF TACROLIMUS: CPT | Performed by: STUDENT IN AN ORGANIZED HEALTH CARE EDUCATION/TRAINING PROGRAM

## 2021-11-07 PROCEDURE — 250N000012 HC RX MED GY IP 250 OP 636 PS 637: Performed by: PHYSICIAN ASSISTANT

## 2021-11-07 PROCEDURE — 94003 VENT MGMT INPAT SUBQ DAY: CPT

## 2021-11-07 PROCEDURE — 71045 X-RAY EXAM CHEST 1 VIEW: CPT | Mod: 26 | Performed by: RADIOLOGY

## 2021-11-07 PROCEDURE — 999N000157 HC STATISTIC RCP TIME EA 10 MIN

## 2021-11-07 PROCEDURE — 82803 BLOOD GASES ANY COMBINATION: CPT | Performed by: STUDENT IN AN ORGANIZED HEALTH CARE EDUCATION/TRAINING PROGRAM

## 2021-11-07 PROCEDURE — 94668 MNPJ CHEST WALL SBSQ: CPT

## 2021-11-07 PROCEDURE — 85027 COMPLETE CBC AUTOMATED: CPT

## 2021-11-07 PROCEDURE — 85610 PROTHROMBIN TIME: CPT | Performed by: STUDENT IN AN ORGANIZED HEALTH CARE EDUCATION/TRAINING PROGRAM

## 2021-11-07 PROCEDURE — 94640 AIRWAY INHALATION TREATMENT: CPT

## 2021-11-07 PROCEDURE — 99291 CRITICAL CARE FIRST HOUR: CPT | Mod: 24 | Performed by: ANESTHESIOLOGY

## 2021-11-07 PROCEDURE — 80069 RENAL FUNCTION PANEL: CPT

## 2021-11-07 PROCEDURE — 258N000003 HC RX IP 258 OP 636: Performed by: THORACIC SURGERY (CARDIOTHORACIC VASCULAR SURGERY)

## 2021-11-07 PROCEDURE — 200N000002 HC R&B ICU UMMC

## 2021-11-07 PROCEDURE — 250N000011 HC RX IP 250 OP 636: Performed by: STUDENT IN AN ORGANIZED HEALTH CARE EDUCATION/TRAINING PROGRAM

## 2021-11-07 PROCEDURE — 85520 HEPARIN ASSAY: CPT | Performed by: THORACIC SURGERY (CARDIOTHORACIC VASCULAR SURGERY)

## 2021-11-07 PROCEDURE — 84100 ASSAY OF PHOSPHORUS: CPT | Performed by: THORACIC SURGERY (CARDIOTHORACIC VASCULAR SURGERY)

## 2021-11-07 PROCEDURE — 83735 ASSAY OF MAGNESIUM: CPT | Performed by: THORACIC SURGERY (CARDIOTHORACIC VASCULAR SURGERY)

## 2021-11-07 PROCEDURE — 250N000013 HC RX MED GY IP 250 OP 250 PS 637: Performed by: THORACIC SURGERY (CARDIOTHORACIC VASCULAR SURGERY)

## 2021-11-07 PROCEDURE — 85520 HEPARIN ASSAY: CPT | Performed by: ANESTHESIOLOGY

## 2021-11-07 PROCEDURE — 250N000013 HC RX MED GY IP 250 OP 250 PS 637: Performed by: NURSE PRACTITIONER

## 2021-11-07 PROCEDURE — 250N000011 HC RX IP 250 OP 636

## 2021-11-07 PROCEDURE — 80048 BASIC METABOLIC PNL TOTAL CA: CPT

## 2021-11-07 PROCEDURE — 250N000012 HC RX MED GY IP 250 OP 636 PS 637: Performed by: INTERNAL MEDICINE

## 2021-11-07 PROCEDURE — 250N000011 HC RX IP 250 OP 636: Performed by: THORACIC SURGERY (CARDIOTHORACIC VASCULAR SURGERY)

## 2021-11-07 PROCEDURE — 99233 SBSQ HOSP IP/OBS HIGH 50: CPT | Mod: 24 | Performed by: INTERNAL MEDICINE

## 2021-11-07 PROCEDURE — 250N000009 HC RX 250: Performed by: THORACIC SURGERY (CARDIOTHORACIC VASCULAR SURGERY)

## 2021-11-07 RX ORDER — POTASSIUM CHLORIDE 20MEQ/15ML
40 LIQUID (ML) ORAL DAILY
Status: DISCONTINUED | OUTPATIENT
Start: 2021-11-07 | End: 2021-11-23

## 2021-11-07 RX ORDER — POTASSIUM CHLORIDE 1.5 G/1.58G
20 POWDER, FOR SOLUTION ORAL ONCE
Status: COMPLETED | OUTPATIENT
Start: 2021-11-07 | End: 2021-11-07

## 2021-11-07 RX ORDER — MAGNESIUM SULFATE HEPTAHYDRATE 40 MG/ML
2 INJECTION, SOLUTION INTRAVENOUS ONCE
Status: COMPLETED | OUTPATIENT
Start: 2021-11-07 | End: 2021-11-07

## 2021-11-07 RX ORDER — FUROSEMIDE 10 MG/ML
20 INJECTION INTRAMUSCULAR; INTRAVENOUS ONCE
Status: COMPLETED | OUTPATIENT
Start: 2021-11-07 | End: 2021-11-07

## 2021-11-07 RX ADMIN — ACETAMINOPHEN 975 MG: 325 TABLET, FILM COATED ORAL at 23:44

## 2021-11-07 RX ADMIN — NYSTATIN 1000000 UNITS: 500000 SUSPENSION ORAL at 19:41

## 2021-11-07 RX ADMIN — HUMAN INSULIN 1 UNITS/HR: 100 INJECTION, SOLUTION SUBCUTANEOUS at 12:15

## 2021-11-07 RX ADMIN — AMIODARONE HYDROCHLORIDE 200 MG: 200 TABLET ORAL at 07:58

## 2021-11-07 RX ADMIN — TACROLIMUS 2 MG: 5 CAPSULE ORAL at 08:05

## 2021-11-07 RX ADMIN — MULTIVIT AND MINERALS-FERROUS GLUCONATE 9 MG IRON/15 ML ORAL LIQUID 15 ML: at 07:58

## 2021-11-07 RX ADMIN — MAGNESIUM SULFATE IN WATER 2 G: 40 INJECTION, SOLUTION INTRAVENOUS at 06:18

## 2021-11-07 RX ADMIN — METOPROLOL TARTRATE 50 MG: 25 TABLET, FILM COATED ORAL at 07:58

## 2021-11-07 RX ADMIN — MYCOPHENOLATE MOFETIL 1000 MG: 200 POWDER, FOR SUSPENSION ORAL at 19:42

## 2021-11-07 RX ADMIN — LEVALBUTEROL HYDROCHLORIDE 1.25 MG: 1.25 SOLUTION RESPIRATORY (INHALATION) at 07:49

## 2021-11-07 RX ADMIN — LEVALBUTEROL HYDROCHLORIDE 1.25 MG: 1.25 SOLUTION RESPIRATORY (INHALATION) at 21:49

## 2021-11-07 RX ADMIN — METOCLOPRAMIDE HYDROCHLORIDE 10 MG: 5 INJECTION INTRAMUSCULAR; INTRAVENOUS at 00:59

## 2021-11-07 RX ADMIN — METOPROLOL TARTRATE 50 MG: 25 TABLET, FILM COATED ORAL at 19:41

## 2021-11-07 RX ADMIN — ACETYLCYSTEINE 2 ML: 200 SOLUTION ORAL; RESPIRATORY (INHALATION) at 07:49

## 2021-11-07 RX ADMIN — ACETYLCYSTEINE 2 ML: 200 SOLUTION ORAL; RESPIRATORY (INHALATION) at 11:38

## 2021-11-07 RX ADMIN — NYSTATIN 1000000 UNITS: 500000 SUSPENSION ORAL at 07:59

## 2021-11-07 RX ADMIN — LEVALBUTEROL HYDROCHLORIDE 1.25 MG: 1.25 SOLUTION RESPIRATORY (INHALATION) at 15:39

## 2021-11-07 RX ADMIN — HEPARIN SODIUM 800 UNITS/HR: 10000 INJECTION, SOLUTION INTRAVENOUS at 23:44

## 2021-11-07 RX ADMIN — Medication 40 MG: at 08:04

## 2021-11-07 RX ADMIN — POTASSIUM PHOSPHATE, MONOBASIC POTASSIUM PHOSPHATE, DIBASIC 9 MMOL: 224; 236 INJECTION, SOLUTION, CONCENTRATE INTRAVENOUS at 06:33

## 2021-11-07 RX ADMIN — CALCIUM CARBONATE 600 MG (1,500 MG)-VITAMIN D3 400 UNIT TABLET 1 TABLET: at 07:59

## 2021-11-07 RX ADMIN — ACETYLCYSTEINE 2 ML: 200 SOLUTION ORAL; RESPIRATORY (INHALATION) at 21:49

## 2021-11-07 RX ADMIN — SULFAMETHOXAZOLE AND TRIMETHOPRIM 80 MG: 200; 40 SUSPENSION ORAL at 08:04

## 2021-11-07 RX ADMIN — TACROLIMUS 1 MG: 5 CAPSULE ORAL at 18:09

## 2021-11-07 RX ADMIN — NYSTATIN 1000000 UNITS: 500000 SUSPENSION ORAL at 16:08

## 2021-11-07 RX ADMIN — MYCOPHENOLATE MOFETIL 1000 MG: 200 POWDER, FOR SUSPENSION ORAL at 08:04

## 2021-11-07 RX ADMIN — POTASSIUM CHLORIDE 20 MEQ: 1.5 POWDER, FOR SOLUTION ORAL at 06:18

## 2021-11-07 RX ADMIN — ACYCLOVIR 400 MG: 200 SUSPENSION ORAL at 19:42

## 2021-11-07 RX ADMIN — POTASSIUM CHLORIDE 40 MEQ: 40 SOLUTION ORAL at 10:44

## 2021-11-07 RX ADMIN — FUROSEMIDE 20 MG: 10 INJECTION, SOLUTION INTRAVENOUS at 10:44

## 2021-11-07 RX ADMIN — LEVOTHYROXINE SODIUM 25 MCG: 0.03 TABLET ORAL at 07:58

## 2021-11-07 RX ADMIN — ACYCLOVIR 400 MG: 200 SUSPENSION ORAL at 08:04

## 2021-11-07 RX ADMIN — LIDOCAINE 2 PATCH: 560 PATCH PERCUTANEOUS; TOPICAL; TRANSDERMAL at 10:30

## 2021-11-07 RX ADMIN — PREDNISOLONE 12.5 MG: 15 SOLUTION ORAL at 19:42

## 2021-11-07 RX ADMIN — LEVALBUTEROL HYDROCHLORIDE 1.25 MG: 1.25 SOLUTION RESPIRATORY (INHALATION) at 11:38

## 2021-11-07 RX ADMIN — CALCIUM CARBONATE 600 MG (1,500 MG)-VITAMIN D3 400 UNIT TABLET 1 TABLET: at 18:09

## 2021-11-07 RX ADMIN — ACETYLCYSTEINE 2 ML: 200 SOLUTION ORAL; RESPIRATORY (INHALATION) at 15:39

## 2021-11-07 RX ADMIN — FLUCONAZOLE IN SODIUM CHLORIDE 400 MG: 2 INJECTION, SOLUTION INTRAVENOUS at 12:12

## 2021-11-07 RX ADMIN — Medication 40 MG: at 19:42

## 2021-11-07 RX ADMIN — NYSTATIN 1000000 UNITS: 500000 SUSPENSION ORAL at 12:12

## 2021-11-07 RX ADMIN — PREDNISOLONE 12.5 MG: 15 SOLUTION ORAL at 08:05

## 2021-11-07 ASSESSMENT — ACTIVITIES OF DAILY LIVING (ADL)
ADLS_ACUITY_SCORE: 22

## 2021-11-07 NOTE — PROGRESS NOTES
Pulmonary Medicine  Cystic Fibrosis - Lung Transplant Team  Progress Note  2021       Patient: Edson Thornton  MRN: [id number]  : 1965 (age 56 year old)  Transplant: 10/16/2021 (Lung), POD#22)  Admission date: 2021    Assessment & Plan:     Edson Thornton is a 56 year old male with a PMH significant for NSIP/ILD, bronchiectasis, moderate PH, RA, SALTY, chronic HSV infection, hypogammaglobulinemia, steroid-induced diabetes, hypothyroidism, PFO, HTN, HLD, duodenal anomaly, anxiety, and depression.  Admitted on 21 from OSH for acute on chronic respiratory failure 2/2 ILD exacerbation, now s/p BSLT on 10/16/21. Surgery relatively uncomplicated but pt. with low cardiac index and PGD immediately post-op.  Caroline weaned off 10/22, remains intubated, s/p trach and PEG/J tube placement with thoracic surgery 10/29.  Post-op course complicated by encephalopathy and diffuse weakness, acute to subacute CVA, afib with RVR, BRENNAN, GI bleed due to NJ/OG trauma, Candidemia/Candida empyema, and recurrent fevers.  Neuro status remains tenuous, although some improvement noted since 10/29.  Code called  due to bradycardia/asystole (required 1 round of CPR, no medications) and then progressively hypotensive, noted to have acute drop in hemoglobin and bloody G tube output.  EGD with large amount of clotted blood in stomach and area of raised mucosa near PEG tube site that was clipped, no active bleeding.     Today's recommendations:   - PS trials as tolerated  - Diuresis per ICU team  - CXR daily with left chest tube in place  - Tacrolimus level was 7.0. Will continue to monitor w/o dose change.  He is on daily levels (ordered)  - Prednisolone taper due today (ordered)  - CMV and EBV done , pending  - Continue acyclovir through POD #30  - Coccidioides Ag ordered   - IgG ordered   - DSA ordered 11/15  - PHS high risk donor risk labs ordered 11/15     S/p BSLT for ILD:  Acute hypoxic respiratory  failure s/p trach:   Pulmonary edema:  Bilateral pleural effusions: Unfortunately had not received vaccination for flu, PNA, or COVID-19 PTA.  Explant pathology with NSIP, no malignancy.  PGD 2-3.  Weaned off paralytic 10/19 (for vent dyssynchrony) and Caroline 10/22.  Initial difficulty weaning sedation given agitation then with neurological findings as below.  CXR initially post-op with pulmonary edema, aggressively diuresed.  Recurring fevers post-op, see ID section below.  Most recent bronch 10/30 with thick secretions in the RUL and LLL and mild mucosal erythema, bilateral anastomoses intact.  Remains intubated with persistent encephalopathy as below, s/p trach and PEG/J tube placement with thoracic surgery 10/29.  Had been intermittent tolerating PS trials.  Code called 11/2 for bradycardia/asystole (required 1 round of CPR, no medications) then progressively hypotensive, GI bleed as below.  Chest CT 11/2 with increased bilateral pleural effusions (moderate left, small right), bibasilar atelectasis with area of hypoenhancing parenchyma in LLL (suspicious for infection), and numerous nondisplaced anterior rib fractures bilaterally.  S/p left chest tube placement in IR 11/3, right deferred given very little effusion on US.  - Nebs: levalbuterol and Mucomyst QID  - Aggressive pulmonary toilet with chest physiotherapy QID  - Ventilator management per ICU team, PS trials as tolerated  - Volume management per nephrology and ICU team  - CXR daily. Today's study (personally reviewed) shows mild increase in basilar layered opacity, peripheral consolidation at the LLL.   - Defer bronch today, revisit prn     Immunosuppression: s/p induction therapy with basiliximab 10/16 (and high dose IV steroid) and 10/20  - Tacrolimus SL 0.5 mg qAM / 1 mg qPM (11/2, changed to SL given GI bleed).  Goal level 8-12.    - MMF 1000 mg BID (11/2, decreased given GI bleed, AZA to be avoided given TPMT)  - Prednisolone 15 mg qAM / 12.5 mg qPM,  next taper due 11/21  (NOT ordered)  Date AM dose (mg) PM dose (mg)   10/31/21 15 12.5   11/7/21 12.5 12.5   11/21/21 12.5 10   12/5/21 10 10   1/2/22 10 7.5   1/30/22 7.5 7.5   2/27/22 7.5 5   3/27/22 5 5   4/24/22 5 2.5      Prophylaxis:   - Bactrim briefly held due to BRENNAN, resumed 11/6.  - Nystatin for oral candidiasis ppx, 6 month course  - See below for serologies and viral ppx:    Donor Recipient Intervention   CMV status Negative Negative None, CMV monthly (ordered 11/16)   EBV status Positive Positive None, EBV monthly (ordered 11/16)   HSV status N/A Positive Acyclovir POD #1-30   (recent infection history pre-txp)      ID: Concern for possible Strongyloides exposure pre-transplant s/p ivermectin x1 dose (9/17).  Donor and recipient cultures NGTD.  S/p IV ceftazidime/vancomycin for 48h per protocol and additional empiric ceftazidime 10/19-10/23 given recurrent fevers.  Bronch cultures 10/17 with Staph epi.  Cryptococcal Ag negative 10/23.    - Monthly Coccidioides Ag x12 months post-transplant per ID (urine and serum negative 10/17), due 11/17 (ordered)     Recurring fevers:  Fevers post-op, Tmax 101.7 POD #1.  Febrile with worsening leukocytosis again 10/25, generally persisting.  Trach sputum culture (10/25) with GPC, normal jean marie.  LP (10/29), xanthochromic with pleocytosis thought to be appropriate given RBC and WBCs, no ABX recommended per transplant ID and neurology.  Bronch culture (10/30) with GPC in clusters, normal jean marie.  S/p empiric meropenem 10/28-11/2.  - Trach sputum culture (11/2) with Staph epi.  Not treated per TID and clinically improving.      Disseminated Candida: Noted on blood cultures 10/20 and 10/22.  BDG fungitell positive (399) on 10/20.  Respiratory cultures with persistent Candida albicans.  TC 10/23 without evidence of endocarditis.  Ophthalmology consult 10/24 with benign dilated fundoscopic exam.  Candida empyema also noted 10/25, chest tubes inadvertently removed by CVTS  10/28, left chest tube replaced by IR 11/3 as above.  - Repeat blood cultures (10/23, 10/25, 11/2) NGTD   - Pleural cultures (11/3) Candida albicans; Cytology - acute inflammation, negative for malignancy   - Fluconazole (10/26, prior micafungin 10/22-10/27), through 11/25 per TID.     HSV: Chronic intermittent active infection pre-transplant with recent HSV infection: crusted lesions throughout left side of jaw, s/p 10 day treatment course of ACV through 10/9.  HSV PCR blood negative 10/17.  - ACV ppx as above (started POD #1 instead of POD #8 given HSV history and location)     Hypogammaglobulinemia: IgG previously low at 364 (9/7).  Noted at 265 at time of transplant, s/p IVIG with premedication 10/21.    - Repeat IgG 11/17 (ordered)     Positive cross match: Note that he received two doses of rituximab in June, which is likely contributing to cross match result.  DSA most recently negative 11/1.  - DSA monitoring q2 weeks (11/15, ordered)     PHS risk criteria donor:  Additional labs required post-transplant (between 4-8 weeks post-op): Hepatitis B, Hepatitis C, and HIV by VISHAL (ordered 11/15).     SALTY: Noted pre-transplant.  Home CPAP 6-12 cm H2O.  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  MRI (10/23) with multifocal subacute infarcts within both cerebral hemispheres and left cerebellum.  EEG without seizures 10/22, ammonia normal.  DDx include surgery v embolic v infectious (see above regarding ID work up).  Heparin drip started 10/23.   Repeat stroke code 10/25 with marked decrease in responsiveness with sedation wean.  Pupils not equal (3 mm R, 2 mm L with extropia) and gag reflex initially  absent.  CTA head without obvious new pathology, MRI brain (with and without contrast) primarily revealing for infarct, low likelihood of PRES.  VEEG per neuro with severe diffuse encephalopathy.  Etiology of CVA likely 2/2 afib, PFO, or perioperative.  Some improvement noted since 10/29, repeat head CT 11/2 (following code) without new acute intracranial abnormalities.     Afib with RVR: Noted 10/18, started on amiodarone drip and transitioned to PO 10/21, dose decreased 10/29 (anticipate 4 week course).  Currently in SR.  Heparin drip as above.  Metoprolol resumed 11/3.     BRENNAN, Improving:   Hyperkalemia: Baseline creatinine 0.7.  BRENNAN noted POD #1, UO also downtrending.  Improved with aggressive diuresis, Cr worsened again (1.38 on 11/2) along with up trending BUN and hyperkalemia.  Nephrology consulted 11/1, thought to be prerenal/component of ATN.  - Holding Bactrim as above  - Management per nephrology and primary team     Recurrent GI bleed: Hemoglobin dropped to 6.6 10/22, s/p 2 units pRBC.  OG tube with bloody output, EGD 10/23 noted NJ/OG tube trauma with scant oozing.  IV PPI BID.  Progressive hypotensive 11/2, hemoglobin 5.8 with bloody G tube output.  Heparin drip held, transitioned to PPI drip, and MTP activated.  EGD 11/2 with large amount of clotted blood in stomach and area of raised mucosa with small adherent clot near PEG tube site that was clipped, no active bleeding.  Abdominal CT 11/2 with stomach distended with hyperdense fluid suggestive of blood products and dilated small bowel.  Maroon stools 11/3, repeat EGD with extensive old blood in stomach, no active bleeding, small nodular area with prior clips clipped again. Repeat EGD (11/5) with ulcer noted at PEG-bumper site, gastritis, and suction marks from G tube, and G-J tube is coiled in the duodenum.   - S/p Reglan q8h x 24h, repeat AXR pending.         Right subclavian DVT: Seen on UE US from 11/4. Low-intensity heparin was re-started on  "11/6 no no further signs of bleeding.       Elevated LFTs: Shock liver post-op.  Initial ALT//230 evening of surgery, then with marked increase to 1550/1246 POD #1.  Had improved although now with acute rise again likely in setting of shock, again improving.     We appreciate the excellent care provided by the CVICU and CVTS teams.  Recommendations communicated via in person rounding and this note.  Will continue to follow along closely, please do not hesitate to call with any questions or concerns.    Jody Sifuentes MD [facility]     Subjective & Interval History:     No acute events overnight.  This morning able to move his LUE on command.  Per nursing did move his RUE but not purposefully or on command.      Review of Systems:     Please see HPI, otherwise the complete 10 point ROS is negative.     Physical Exam:     Vital signs:  Temp: 97.6  F (36.4  C) Temp src: Axillary BP: 133/77 Pulse: 73   Resp: 23 SpO2: 99 % O2 Device: Mechanical Ventilator Oxygen Delivery: 15 LPM Height: 162.6 cm (5' 4\") Weight: 71 kg (156 lb 8.4 oz)  I/O:   Intake/Output Summary (Last 24 hours) at 11/7/2021 0948  Last data filed at 11/7/2021 0800  Gross per 24 hour   Intake 2416.72 ml   Output 1830 ml   Net 586.72 ml       GENERAL: NAD  HEENT: NCAT, EOMI, no scleral icterus, oral mucosa moist  Lungs: good air flow, mild crackles, no rhonchi or wheezing;  Bivona #6 trach in place  CV: RRR, S1S2, no murmurs noted   Abdomen: normoactive BS, soft, non tender  Neuro: Opens eyes to name and tracks. Moved toes on command and squeezed his left hand on command.    Psychiatric: unable to assess  Skin: no rash, jaundice or lesions on limited exam  Extremities: No clubbing, cyanosis or edema.         Lines, Drains, and Devices:  Peripheral IV 10/25/21 Right;Anterior;Lateral Lower forearm (Active)   Site Assessment WDL 11/07/21 0800   Line Status Infusing;Checked every 1 hour 11/07/21 0800   Dressing Intervention New dressing  11/07/21 0000 "   Phlebitis Scale 0-->no symptoms 11/07/21 0800   Infiltration Scale 0 11/07/21 0800   Infiltration Site Treatment Method  None 11/05/21 0400   If infiltrated, was a vesicant infusing? No 10/30/21 0400   Number of days: 13       Peripheral IV 10/25/21 Anterior;Distal;Right Upper arm (Active)   Site Assessment WDL 11/07/21 0800   Line Status Infusing;Checked every 1 hour 11/07/21 0800   Dressing Intervention New dressing  11/07/21 0000   Phlebitis Scale 0-->no symptoms 11/07/21 0800   Infiltration Scale 0 11/07/21 0800   Infiltration Site Treatment Method  None 11/05/21 0400   If infiltrated, was a vesicant infusing? No 10/30/21 0400   Number of days: 13       Peripheral IV 11/02/21 Anterior;Distal;Right Lower forearm (Active)   Site Assessment WDL 11/07/21 0800   Line Status Saline locked 11/07/21 0800   Phlebitis Scale 0-->no symptoms 11/07/21 0800   Infiltration Scale 0 11/07/21 0800   Infiltration Site Treatment Method  None 11/05/21 0400   Number of days: 5       Peripheral IV 11/02/21 Anterior;Right Upper forearm (Active)   Site Assessment WDL 11/07/21 0800   Line Status Saline locked 11/07/21 0800   Phlebitis Scale 0-->no symptoms 11/07/21 0800   Infiltration Scale 0 11/07/21 0400   Infiltration Site Treatment Method  None 11/05/21 0400   Number of days: 5       PICC Triple Lumen 11/04/21 Left Basilic Access. PICC okay to use. (Active)   Site Assessment WDL 11/07/21 0800   External Cath Length (cm) 1 cm 11/04/21 1747   Extremity Circumference (cm) 28 cm 11/04/21 1747   Dressing Intervention Transparent;Chlorhexidine patch 11/07/21 0800   Dressing Change Due 11/14/21 11/07/21 0800   Mosley - Status saline locked 11/07/21 0800   Mosley - Cap Change Due 11/09/21 11/07/21 0800   Red - Status infusing 11/07/21 0800   Red - Cap Change Due 11/09/21 11/07/21 0800   White - Status infusing 11/07/21 0800   White - Cap Change Due 11/09/21 11/07/21 0800   Extravasation? No 11/07/21 0800   Line Necessity Yes, meets criteria  11/07/21 0800   Number of days: 3     Data:     LABS    CMP:   Recent Labs   Lab 11/07/21  0802 11/07/21  0626 11/07/21  0518 11/07/21  0517 11/06/21  1613 11/06/21  1611 11/06/21  0557 11/06/21  0438 11/05/21  2355 11/05/21  1624 11/05/21  0508 11/05/21  0337 11/04/21  0351 11/04/21  0339   NA  --   --  146*  146*  --   --  144  --  144  144  --  150*  --  151*   < > 148*  148*   POTASSIUM  --   --  3.5  3.5  --   --  3.5  --  3.9  3.9  --  3.7  --  4.1   < > 4.0  4.0   CHLORIDE  --   --  114*  114*  --   --  114*  --  114*  114*  --  118*  --  123*   < > 121*  121*   CO2  --   --  26  26  --   --  26  --  24  24  --  23  --  24   < > 23  23   ANIONGAP  --   --  6  6  --   --  4  --  6  6  --  9  --  4   < > 4  4   * 108* 113*  113* 117*   < > 122*   < > 190*  190*   < > 103*   < > 130*   < > 138*  138*   BUN  --   --  29  29  --   --  31*  --  36*  36*  --  44*  --  60*   < > 86*  86*   CR  --   --  0.58*  0.58*  --   --  0.58*  --  0.62*  0.62*  --  0.65*  --  0.71   < > 0.88  0.88   GFRESTIMATED  --   --  >90  >90  --   --  >90  --  >90  >90  --  >90  --  >90   < > >90  >90   ERIK  --   --  8.3*  8.3*  --   --  8.0*  --  8.4*  8.4*  --  8.2*  --  8.2*   < > 8.7  8.7   MAG  --   --  1.9  --   --   --   --  1.6  --   --   --  1.9  --  2.0   PHOS  --   --  2.3*  --   --   --   --  2.9  --   --   --  3.0  --  3.7   PROTTOTAL  --   --  4.6*  --   --   --   --  5.1*  --   --   --  4.9*  --  4.8*   ALBUMIN  --   --  1.8*  --   --   --   --  1.9*  --   --   --  1.8*  --  1.7*   BILITOTAL  --   --  0.3  --   --   --   --  0.5  --   --   --  0.5  --  0.4   ALKPHOS  --   --  134  --   --   --   --  149  --   --   --  130  --  129   AST  --   --  6  --   --   --   --  42  --   --   --  59*  --  117*   ALT  --   --  107*  --   --   --   --  176*  --   --   --  226*  --  262*    < > = values in this interval not displayed.     CBC:   Recent Labs   Lab 11/07/21  0518 11/06/21  0438  11/05/21  1624 11/05/21  0337   WBC 13.4* 19.7* 17.5* 18.5*   RBC 2.89* 3.21* 2.95* 2.99*   HGB 8.8* 9.5* 9.0* 8.9*   HCT 27.8* 30.4* 28.4* 28.3*   MCV 96 95 96 95   MCH 30.4 29.6 30.5 29.8   MCHC 31.7 31.3* 31.7 31.4*   RDW 17.9* 18.0* 18.3* 18.6*    176 161 161       INR:   Recent Labs   Lab 11/07/21  0518 11/06/21  0438 11/05/21  0337 11/04/21  0339   INR 1.18* 1.26* 1.27* 1.32*       Glucose:   Recent Labs   Lab 11/07/21  0802 11/07/21  0626 11/07/21  0518 11/07/21  0517 11/07/21  0412 11/07/21  0304   * 108* 113*  113* 117* 125* 159*       Blood Gas:   Recent Labs   Lab 11/07/21  0626 11/06/21  0438 11/05/21  0504 11/02/21  0826 11/02/21  0815   PHV 7.45*  --   --   --  7.23*   PCO2V 40  --   --   --  53*   PO2V 38  --   --   --  25   HCO3V 28  --   --   --  22   LORI 3.3*  --   --   --  -5.4   O2PER 40 40 40   < > 100    < > = values in this interval not displayed.       Culture Data No results for input(s): CULT in the last 168 hours.    Virology Data:   Lab Results   Component Value Date    FLUAH1 Negative 10/17/2021    FLUAH3 Negative 10/17/2021    HY8255 Negative 10/17/2021    IFLUB Negative 10/17/2021    RSVA Negative 10/17/2021    RSVB Negative 10/17/2021    PIV1 Negative 10/17/2021    PIV2 Negative 10/17/2021    PIV3 Negative 10/17/2021    HMPV Negative 10/17/2021    HRVS Negative 10/17/2021    ADVBE Negative 10/17/2021    ADVC Negative 10/17/2021       Historical CMV results (last 3 of prior testing):  Lab Results   Component Value Date    CMVQNT Not Detected 10/26/2021     No results found for: CMVLOG    Urine Studies    Recent Labs   Lab Test 11/01/21  1336 10/25/21  1507   URINEPH 5.5 5.5   NITRITE Negative Negative   LEUKEST Negative Negative   WBCU 0 2       Most Recent [facility]ze Pulmonary Function Testing (FVC/FEV1 only)  No results found for: 20002  No results found for: 20003  No results found for: 20015  No results found for: 20016    IMAGING    Recent Results (from the past 48  hour(s))   XR Abdomen Port 1 View    Narrative    EXAMINATION:  XR ABDOMEN PORT 1 VIEWS 11/5/2021 3:17 PM     COMPARISON: 11/4/2021    HISTORY: J tube placement post reglan    TECHNIQUE: Frontal view of the abdomen.    FINDINGS:     Devices: The enterostomy tube has advanced since the previous day's  radiograph and the tip now projects over the third part of the  duodenum.     No abnormally dilated loops of bowel. No pneumatosis or portal venous  gas.    Stable left pleural chest tube. Surgical changes of bilateral lung  transplant. Intact clamshell sternotomy wires.  Perihilar and basilar  atelectasis in the visualized lung bases.      Impression    IMPRESSION:   1. Slight interval advancement of the enterostomy tube, the tip  projects over the third part of the duodenum.  2. Nonobstructive bowel gas pattern.    I have personally reviewed the examination and initial interpretation  and I agree with the findings.    JODI MENDEZ MD         SYSTEM ID:  [id number]   XR Chest Port 1 View    Narrative    Exam: XR CHEST PORT 1 VIEW, 11/6/2021 3:53 AM    Indication: s/p lung transplant    Comparison: 11/5/2021    Findings:   Tracheostomy unchanged. Left PICC unchanged. Posterior cervical chains  of lung transplant similar to prior with clamshell sternotomy wires.  There is a left basilar chest tube in place. Unchanged hazy bibasilar  opacities. No pneumothorax. Small bilateral pleural effusions.  Unchanged minimally displaced bilateral rib fractures.      Impression    Impression: Stable exam. Small bilateral pleural effusions with  adjacent atelectasis.    I have personally reviewed the examination and initial interpretation  and I agree with the findings.    MAURICIO NAVAS MD         SYSTEM ID:  [id number]   XR Chest Port 1 View    Narrative    Exam: XR CHEST PORT 1 VIEW, 11/7/2021 2:05 AM    Indication: s/p lung transplant    Comparison: 11/6/2021    Findings:   Tracheostomy is unchanged. Postsurgical changes of the  chest with  clamshell sternotomy wires similar to prior. Left PICC unchanged. Left  basilar chest tube unchanged.     Hazy bibasilar airspace opacities are unchanged. No appreciable  pneumothorax. Trace bilateral pleural effusions. Cardiac silhouette is  unchanged. Unchanged bilateral rib fractures.      Impression    Impression: Stable fetal small bilateral pleural effusions with  adjacent atelectasis.    I have personally reviewed the examination and initial interpretation  and I agree with the findings.    MAURICIO NAVAS MD         SYSTEM ID:  [id number]

## 2021-11-07 NOTE — PLAN OF CARE
Major Shift Events: Pt more alert tonight. Following commands and nodding yes and no appropriately. Still unable to move arms much, but does have slight movement at times. NSR on telemetry, BP stable. Shiley 6 trach in place, on CMV settings all night. Low urine output via metzger this AM, MD aware, smear of BM. TF at 30ml/hr, g-tube vented. Insulin drip running per MAR.     Plan: Work with therapy, PS on vent as able.     For vital signs and complete assessments, please see documentation flowsheets.

## 2021-11-07 NOTE — PROGRESS NOTES
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 Corral. 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  UGIB causing code blue on 11/2.     OVERNIGHT EVENTS:  naeo     TODAY'S PROGRESS:   Electrolyte replacement    PLAN:  Neuro/ pain/ sedation:  Acute postoperative pain   Anxiety and depression  Acute to subacute CVA, b/l cerebral hemispheres and L cerebellum, suspect embolic  Encephalopathy and diffuse weakness - improving slightly   R ear lateral canal floor excoriation with bleeding - resolved   - APAP and oxycodone prn, lido patch   - appreciate ENT  - appreciate neuro    Pulmonary care:   SALTY on home CPAP  Acute hypoxic respiratory failure s/p b/l lung transplant 10/16/21  S/p tracheostomy 10/29  Empyema with Candida s/p chest tube 11/3/21  - mechanically ventilated via trach, PST   - Levalbuterol nebs and Mucomyst, chest PT  - Daily CXR  - appreciate Pulmonary  - Diurese    Cardiovascular:    HTN  Afib   PFO  Vasoplegic shock in setting of hypovolemia - resolved  - Monitor hemodynamic status.   - MAP goal <100, SBP <140  - metoprolol 50 BID    - prn labetalol  - hold statin 2/2 elevated LFTs  - Amiodarone 200 mg daily   - low-intensity heparin gtt     GI care/ Nutrition:   Elevated LFTs - recurrent  GI bleed 2/2 NG trauma, now recurrent secondary to GJ bumper ulcer   Moderate malnutrition in the context of acute on chronic illness  PEG-J, with malpositioned J tube   - Diet: tf    - PPI bid    Renal/ Fluid Balance/ Electrolytes:   Acute kidney injury, recurrent, now with azotemia - resolving   Hypernatremia  Hyperkalemia, resolved   BL creat appears to be ~ 0.7  - free water flushes  - Strict I/O, daily  weights  - Avoid/limit nephrotoxins as able  - Replete lytes PRN per protocol     Endocrine:    Stress induced hyperglycemia with steroid-induced component, on DM2  Hypothyroidism  RA  Preop A1c 6.6  - insulin gtt   - Goal BG <180 for optimal healing  - continue home synthroid     ID/ Antibiotics:  Immunosuppression s/p transplant  Candidal infection of donor lungs (likely source), pleural fluid, and fungemia   - NGTD CSF. Last + blood cx 10/22. No vegetations on valves per TC 10/23  - Nystatin, Acyclovir, bactrim   - Tacrolimus (via g tube), prednisone    - appreciate transplant ID, ophthalmology    - d/w ID, no indication to treat Staph epi in sputum   - Fluconazole (end date 11/25)    Heme:     Acute blood loss anemia secondary to surgery and GI bleed, and anemia related to critical illness and iron deficiency    Hypogammaglobulinemia s/p IVIG  Thrombocytopenia, HIT negative - resolved   Lactic acidosis, resolved  Nonocclusive R subclavian thrombus    - heparin low intensity     MSK/ Skin:  Clamshell surgical incision  - Sternal precautions  - Postoperative incision management per protocol  - PT/OT/CR     Prophylaxis:    - Mechanical prophylaxis for DVT: SCDs  - Chemical DVT prophylaxis: heparin low intensity gtt    - PPI for 30 days postoperatively     Lines/ tubes/ drains:  - trach  - peg-j  - Watson 10/25  - PIVs  - chest tube L  - L PICC     Disposition:  - CVICU    Care conference to update family to be planned for early next week    Patient seen, findings and plan discussed with CVICU staff.     Moises Ribera  PGY-3 Anesthesiology      ====================================    SUBJECTIVE:   naeo    OBJECTIVE:   1. VITAL SIGNS:   Temp:  [97.5  F (36.4  C)-98.9  F (37.2  C)] 98.2  F (36.8  C)  Pulse:  [65-98] 69  Resp:  [16-28] 23  BP: (118-159)/(65-94) 139/73  MAP:  [86 mmHg-104 mmHg] 91 mmHg  Arterial Line BP: (136-146)/(64-82) 146/64  FiO2 (%):  [40 %] 40 %  SpO2:  [96 %-100 %] 100 %  Ventilation Mode:  CMV/AC  (Continuous Mandatory Ventilation/ Assist Control)  FiO2 (%): 40 %  Rate Set (breaths/minute): 12 breaths/min  Tidal Volume Set (mL): 400 mL  PEEP (cm H2O): 8 cmH2O  Pressure Support (cm H2O): 7 cmH2O  Oxygen Concentration (%): 40 %  Resp: 23    2. INTAKE/ OUTPUT:   I/O last 3 completed shifts:  In: 2952.28 [I.V.:702.28; NG/GT:1740]  Out: 1950 [Urine:1770; Emesis/NG output:70; Chest Tube:110]    3. PHYSICAL EXAMINATION:   General: NAD  Neuro: opens eyes to voice, wiggles toes BLE to command, no movement BUE to noxious, grimacing only to noxious of L hand and not to R - stable exam from yesterday    Resp: vented via trach, nonlabored   CV: RRR on tele   Abdomen: soft, nontender, rounded   Incisions: c/d/i  Extremities: warm and well perfused, no edema    4. INVESTIGATIONS:   Arterial Blood Gases   Recent Labs   Lab 11/06/21  0438 11/05/21  0504 11/05/21  0336 11/04/21  1016   PH 7.43 7.49* 7.49* 7.46*   PCO2 37 31* 32* 35   PO2 109* 126* 331* 95   HCO3 25 24 24 25     Complete Blood Count   Recent Labs   Lab 11/07/21  0518 11/06/21  0438 11/05/21  1624 11/05/21  0337   WBC 13.4* 19.7* 17.5* 18.5*   HGB 8.8* 9.5* 9.0* 8.9*    176 161 161     Basic Metabolic Panel  Recent Labs   Lab 11/07/21  0626 11/07/21  0518 11/07/21  0517 11/07/21  0412 11/06/21  1613 11/06/21  1611 11/06/21  0557 11/06/21  0438 11/05/21  2355 11/05/21  1624   NA  --  146*  146*  --   --   --  144  --  144  144  --  150*   POTASSIUM  --  3.5  3.5  --   --   --  3.5  --  3.9  3.9  --  3.7   CHLORIDE  --  114*  114*  --   --   --  114*  --  114*  114*  --  118*   CO2  --  26  26  --   --   --  26  --  24  24  --  23   BUN  --  29  29  --   --   --  31*  --  36*  36*  --  44*   CR  --  0.58*  0.58*  --   --   --  0.58*  --  0.62*  0.62*  --  0.65*   * 113*  113* 117* 125*   < > 122*   < > 190*  190*   < > 103*    < > = values in this interval not displayed.     Liver Function Tests  Recent Labs   Lab 11/07/21  0533  11/06/21 0438 11/05/21 0337 11/04/21  0339   AST 6 42 59* 117*   * 176* 226* 262*   ALKPHOS 134 149 130 129   BILITOTAL 0.3 0.5 0.5 0.4   ALBUMIN 1.8* 1.9* 1.8* 1.7*   INR 1.18* 1.26* 1.27* 1.32*     Pancreatic Enzymes  No lab results found in last 7 days.  Coagulation Profile  Recent Labs   Lab 11/07/21 0518 11/06/21 0438 11/05/21 0337 11/04/21  0339 11/03/21  0407 11/02/21  1053 11/02/21  0927 11/02/21  0908 11/02/21  0832   INR 1.18* 1.26* 1.27* 1.32*   < > 1.55*  --    < > 1.38*   PTT  --   --   --   --   --  34 32  --  50*    < > = values in this interval not displayed.        =========================================

## 2021-11-07 NOTE — PROGRESS NOTES
THORACIC SURGERY PROGRESS NOTE     S:  Unable to obtain subjective information due to patient encephalopathy.  Patient is awake at this time.  Per nursing, there has been minimal refluxing into the G-tube.  Bilious in color     O:  Patient Vitals for the past 24 hrs:   BP Temp Temp src Pulse Resp SpO2   11/07/21 1100 (!) 159/95 -- -- 80 24 96 %   11/07/21 1000 (!) 167/78 -- -- 78 26 97 %   11/07/21 0900 (!) 152/67 -- -- 69 26 98 %   11/07/21 0800 133/77 97.6  F (36.4  C) Axillary 73 23 99 %   11/07/21 0749 (!) 165/90 -- -- 81 -- 98 %   11/07/21 0700 (!) 165/90 -- -- 86 28 98 %   11/07/21 0600 139/73 -- -- 69 23 100 %   11/07/21 0500 (!) 141/86 -- -- 84 24 99 %   11/07/21 0400 123/66 98.2  F (36.8  C) Axillary 74 24 99 %   11/07/21 0300 139/80 -- -- 76 22 99 %   11/07/21 0200 130/72 -- -- 73 20 99 %   11/07/21 0131 -- -- -- -- -- 100 %   11/07/21 0100 129/79 -- -- 73 -- 100 %   11/07/21 0000 120/73 97.6  F (36.4  C) Axillary 73 22 99 %   11/06/21 2300 139/77 -- -- 72 17 99 %   11/06/21 2200 122/77 -- -- 70 22 99 %   11/06/21 2100 126/65 -- -- 65 18 99 %   11/06/21 2000 128/72 97.8  F (36.6  C) Axillary 98 20 99 %   11/06/21 1913 128/73 -- -- 80 -- 100 %   11/06/21 1900 128/73 -- -- 73 19 99 %   11/06/21 1800 125/76 -- -- 76 28 99 %   11/06/21 1700 118/68 -- -- 79 22 98 %   11/06/21 1600 125/76 97.5  F (36.4  C) Axillary 79 24 97 %   11/06/21 1536 125/71 -- -- 75 -- 97 %   11/06/21 1500 125/71 -- -- 77 18 98 %   11/06/21 1400 132/81 -- -- 79 17 98 %   11/06/21 1300 136/77 -- -- 77 16 98 %   11/06/21 1200 136/82 98  F (36.7  C) -- 78 18 96 %   11/06/21 1127 127/74 -- -- 75 -- 99 %        Gen: Awake and in no acute distress  Resp: non labored breathing, minimal dry blood around trach  Abd: PEGJ site looks well, no evidence of infection, G-tube was capped as patient had just received medications        A/P: Norberto bell is a 56 year old male with PMH ILD and rheumatoid lung disease, RA, SALTY, hypothyroid, HTN, anxiety  and depression, HLD, 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 Corral. 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 We were consulted to place a PEGJ on 10/29        - Patient currently on TF at 30, with minimal to no reflux into the G  - G port to gravity  -We will obtain a repeat abdominal x-ray ordered for tomorrow to evaluate if the tube had moved past the duodenum secondary to the Reglan and the peristalsis  - Rest of care per primary team        Seen with thoracic fellow, who will discuss with staff.     Vincent Maravilla MD  Surgery PGY1  Pager [phone]

## 2021-11-08 ENCOUNTER — APPOINTMENT (OUTPATIENT)
Dept: GENERAL RADIOLOGY | Facility: CLINIC | Age: 56
End: 2021-11-08
Attending: STUDENT IN AN ORGANIZED HEALTH CARE EDUCATION/TRAINING PROGRAM
Payer: COMMERCIAL

## 2021-11-08 ENCOUNTER — APPOINTMENT (OUTPATIENT)
Dept: PHYSICAL THERAPY | Facility: CLINIC | Age: 56
End: 2021-11-08
Attending: STUDENT IN AN ORGANIZED HEALTH CARE EDUCATION/TRAINING PROGRAM
Payer: COMMERCIAL

## 2021-11-08 ENCOUNTER — APPOINTMENT (OUTPATIENT)
Dept: OCCUPATIONAL THERAPY | Facility: CLINIC | Age: 56
End: 2021-11-08
Attending: STUDENT IN AN ORGANIZED HEALTH CARE EDUCATION/TRAINING PROGRAM
Payer: COMMERCIAL

## 2021-11-08 DIAGNOSIS — Z94.2 LUNG REPLACED BY TRANSPLANT (H): Primary | ICD-10-CM

## 2021-11-08 LAB
ALBUMIN SERPL-MCNC: 1.7 G/DL (ref 3.4–5)
ALP SERPL-CCNC: 134 U/L (ref 40–150)
ALT SERPL W P-5'-P-CCNC: 78 U/L (ref 0–70)
ANION GAP SERPL CALCULATED.3IONS-SCNC: 3 MMOL/L (ref 3–14)
AST SERPL W P-5'-P-CCNC: <3 U/L (ref 0–45)
BILIRUB SERPL-MCNC: 0.2 MG/DL (ref 0.2–1.3)
BUN SERPL-MCNC: 26 MG/DL (ref 7–30)
BUN SERPL-MCNC: 31 MG/DL (ref 7–30)
BUN SERPL-MCNC: 31 MG/DL (ref 7–30)
CALCIUM SERPL-MCNC: 8.3 MG/DL (ref 8.5–10.1)
CALCIUM SERPL-MCNC: 8.6 MG/DL (ref 8.5–10.1)
CALCIUM SERPL-MCNC: 8.6 MG/DL (ref 8.5–10.1)
CHLORIDE BLD-SCNC: 115 MMOL/L (ref 94–109)
CO2 SERPL-SCNC: 27 MMOL/L (ref 20–32)
CO2 SERPL-SCNC: 28 MMOL/L (ref 20–32)
CO2 SERPL-SCNC: 28 MMOL/L (ref 20–32)
CREAT SERPL-MCNC: 0.54 MG/DL (ref 0.66–1.25)
CREAT SERPL-MCNC: 0.56 MG/DL (ref 0.66–1.25)
CREAT SERPL-MCNC: 0.56 MG/DL (ref 0.66–1.25)
ERYTHROCYTE [DISTWIDTH] IN BLOOD BY AUTOMATED COUNT: 18.2 % (ref 10–15)
GFR SERPL CREATININE-BSD FRML MDRD: >90 ML/MIN/1.73M2
GLUCOSE BLD-MCNC: 126 MG/DL (ref 70–99)
GLUCOSE BLD-MCNC: 152 MG/DL (ref 70–99)
GLUCOSE BLD-MCNC: 152 MG/DL (ref 70–99)
GLUCOSE BLDC GLUCOMTR-MCNC: 107 MG/DL (ref 70–99)
GLUCOSE BLDC GLUCOMTR-MCNC: 113 MG/DL (ref 70–99)
GLUCOSE BLDC GLUCOMTR-MCNC: 123 MG/DL (ref 70–99)
GLUCOSE BLDC GLUCOMTR-MCNC: 124 MG/DL (ref 70–99)
GLUCOSE BLDC GLUCOMTR-MCNC: 131 MG/DL (ref 70–99)
GLUCOSE BLDC GLUCOMTR-MCNC: 131 MG/DL (ref 70–99)
GLUCOSE BLDC GLUCOMTR-MCNC: 134 MG/DL (ref 70–99)
GLUCOSE BLDC GLUCOMTR-MCNC: 134 MG/DL (ref 70–99)
GLUCOSE BLDC GLUCOMTR-MCNC: 137 MG/DL (ref 70–99)
GLUCOSE BLDC GLUCOMTR-MCNC: 146 MG/DL (ref 70–99)
GLUCOSE BLDC GLUCOMTR-MCNC: 146 MG/DL (ref 70–99)
GLUCOSE BLDC GLUCOMTR-MCNC: 150 MG/DL (ref 70–99)
GLUCOSE BLDC GLUCOMTR-MCNC: 163 MG/DL (ref 70–99)
GLUCOSE BLDC GLUCOMTR-MCNC: 99 MG/DL (ref 70–99)
HCT VFR BLD AUTO: 28.4 % (ref 40–53)
HGB BLD-MCNC: 9 G/DL (ref 13.3–17.7)
INR PPP: 1.18 (ref 0.85–1.15)
MAGNESIUM SERPL-MCNC: 1.8 MG/DL (ref 1.6–2.3)
MAGNESIUM SERPL-MCNC: 2.2 MG/DL (ref 1.6–2.3)
MCH RBC QN AUTO: 30.6 PG (ref 26.5–33)
MCHC RBC AUTO-ENTMCNC: 31.7 G/DL (ref 31.5–36.5)
MCV RBC AUTO: 97 FL (ref 78–100)
PHOSPHATE SERPL-MCNC: 2.8 MG/DL (ref 2.5–4.5)
PLATELET # BLD AUTO: 182 10E3/UL (ref 150–450)
POTASSIUM BLD-SCNC: 3.9 MMOL/L (ref 3.4–5.3)
POTASSIUM BLD-SCNC: 3.9 MMOL/L (ref 3.4–5.3)
POTASSIUM BLD-SCNC: 4.7 MMOL/L (ref 3.4–5.3)
PROT SERPL-MCNC: 4.6 G/DL (ref 6.8–8.8)
RBC # BLD AUTO: 2.94 10E6/UL (ref 4.4–5.9)
SODIUM SERPL-SCNC: 145 MMOL/L (ref 133–144)
SODIUM SERPL-SCNC: 146 MMOL/L (ref 133–144)
SODIUM SERPL-SCNC: 146 MMOL/L (ref 133–144)
TACROLIMUS BLD-MCNC: 6.6 UG/L (ref 5–15)
TME LAST DOSE: NORMAL H
TME LAST DOSE: NORMAL H
UFH PPP CHRO-ACNC: 0.31 IU/ML
WBC # BLD AUTO: 16.5 10E3/UL (ref 4–11)

## 2021-11-08 PROCEDURE — 94003 VENT MGMT INPAT SUBQ DAY: CPT

## 2021-11-08 PROCEDURE — 250N000013 HC RX MED GY IP 250 OP 250 PS 637: Performed by: THORACIC SURGERY (CARDIOTHORACIC VASCULAR SURGERY)

## 2021-11-08 PROCEDURE — 250N000013 HC RX MED GY IP 250 OP 250 PS 637: Performed by: STUDENT IN AN ORGANIZED HEALTH CARE EDUCATION/TRAINING PROGRAM

## 2021-11-08 PROCEDURE — 71045 X-RAY EXAM CHEST 1 VIEW: CPT

## 2021-11-08 PROCEDURE — 250N000011 HC RX IP 250 OP 636: Performed by: PHYSICIAN ASSISTANT

## 2021-11-08 PROCEDURE — 80197 ASSAY OF TACROLIMUS: CPT | Performed by: STUDENT IN AN ORGANIZED HEALTH CARE EDUCATION/TRAINING PROGRAM

## 2021-11-08 PROCEDURE — 250N000011 HC RX IP 250 OP 636: Performed by: THORACIC SURGERY (CARDIOTHORACIC VASCULAR SURGERY)

## 2021-11-08 PROCEDURE — 85027 COMPLETE CBC AUTOMATED: CPT

## 2021-11-08 PROCEDURE — 74018 RADEX ABDOMEN 1 VIEW: CPT

## 2021-11-08 PROCEDURE — 85610 PROTHROMBIN TIME: CPT | Performed by: STUDENT IN AN ORGANIZED HEALTH CARE EDUCATION/TRAINING PROGRAM

## 2021-11-08 PROCEDURE — 97530 THERAPEUTIC ACTIVITIES: CPT | Mod: GP

## 2021-11-08 PROCEDURE — 94668 MNPJ CHEST WALL SBSQ: CPT

## 2021-11-08 PROCEDURE — 250N000013 HC RX MED GY IP 250 OP 250 PS 637

## 2021-11-08 PROCEDURE — 83735 ASSAY OF MAGNESIUM: CPT | Performed by: PHYSICIAN ASSISTANT

## 2021-11-08 PROCEDURE — 94640 AIRWAY INHALATION TREATMENT: CPT | Mod: 76

## 2021-11-08 PROCEDURE — 250N000011 HC RX IP 250 OP 636

## 2021-11-08 PROCEDURE — 250N000009 HC RX 250: Performed by: STUDENT IN AN ORGANIZED HEALTH CARE EDUCATION/TRAINING PROGRAM

## 2021-11-08 PROCEDURE — 71045 X-RAY EXAM CHEST 1 VIEW: CPT | Mod: 26 | Performed by: RADIOLOGY

## 2021-11-08 PROCEDURE — 94640 AIRWAY INHALATION TREATMENT: CPT

## 2021-11-08 PROCEDURE — 80053 COMPREHEN METABOLIC PANEL: CPT

## 2021-11-08 PROCEDURE — 97165 OT EVAL LOW COMPLEX 30 MIN: CPT | Mod: GO

## 2021-11-08 PROCEDURE — 97110 THERAPEUTIC EXERCISES: CPT | Mod: GO

## 2021-11-08 PROCEDURE — 999N000157 HC STATISTIC RCP TIME EA 10 MIN

## 2021-11-08 PROCEDURE — 99233 SBSQ HOSP IP/OBS HIGH 50: CPT | Mod: 24 | Performed by: INTERNAL MEDICINE

## 2021-11-08 PROCEDURE — 250N000013 HC RX MED GY IP 250 OP 250 PS 637: Performed by: NURSE PRACTITIONER

## 2021-11-08 PROCEDURE — 85520 HEPARIN ASSAY: CPT | Performed by: THORACIC SURGERY (CARDIOTHORACIC VASCULAR SURGERY)

## 2021-11-08 PROCEDURE — 97112 NEUROMUSCULAR REEDUCATION: CPT | Mod: GP

## 2021-11-08 PROCEDURE — 250N000012 HC RX MED GY IP 250 OP 636 PS 637: Performed by: NURSE PRACTITIONER

## 2021-11-08 PROCEDURE — 250N000011 HC RX IP 250 OP 636: Performed by: STUDENT IN AN ORGANIZED HEALTH CARE EDUCATION/TRAINING PROGRAM

## 2021-11-08 PROCEDURE — 83735 ASSAY OF MAGNESIUM: CPT | Performed by: THORACIC SURGERY (CARDIOTHORACIC VASCULAR SURGERY)

## 2021-11-08 PROCEDURE — 200N000002 HC R&B ICU UMMC

## 2021-11-08 PROCEDURE — 250N000012 HC RX MED GY IP 250 OP 636 PS 637: Performed by: INTERNAL MEDICINE

## 2021-11-08 PROCEDURE — 250N000013 HC RX MED GY IP 250 OP 250 PS 637: Performed by: PHYSICIAN ASSISTANT

## 2021-11-08 PROCEDURE — 74018 RADEX ABDOMEN 1 VIEW: CPT | Mod: 26 | Performed by: RADIOLOGY

## 2021-11-08 PROCEDURE — 250N000012 HC RX MED GY IP 250 OP 636 PS 637: Performed by: PHYSICIAN ASSISTANT

## 2021-11-08 PROCEDURE — 84100 ASSAY OF PHOSPHORUS: CPT | Performed by: STUDENT IN AN ORGANIZED HEALTH CARE EDUCATION/TRAINING PROGRAM

## 2021-11-08 PROCEDURE — 80048 BASIC METABOLIC PNL TOTAL CA: CPT

## 2021-11-08 RX ORDER — POTASSIUM CHLORIDE 20MEQ/15ML
20 LIQUID (ML) ORAL ONCE
Status: COMPLETED | OUTPATIENT
Start: 2021-11-08 | End: 2021-11-08

## 2021-11-08 RX ORDER — FUROSEMIDE 10 MG/ML
20 INJECTION INTRAMUSCULAR; INTRAVENOUS ONCE
Status: COMPLETED | OUTPATIENT
Start: 2021-11-08 | End: 2021-11-08

## 2021-11-08 RX ORDER — MAGNESIUM SULFATE HEPTAHYDRATE 40 MG/ML
2 INJECTION, SOLUTION INTRAVENOUS ONCE
Status: COMPLETED | OUTPATIENT
Start: 2021-11-08 | End: 2021-11-08

## 2021-11-08 RX ADMIN — LEVALBUTEROL HYDROCHLORIDE 1.25 MG: 1.25 SOLUTION RESPIRATORY (INHALATION) at 20:41

## 2021-11-08 RX ADMIN — SODIUM CHLORIDE, PRESERVATIVE FREE 5 ML: 5 INJECTION INTRAVENOUS at 18:20

## 2021-11-08 RX ADMIN — LEVOTHYROXINE SODIUM 25 MCG: 0.03 TABLET ORAL at 08:59

## 2021-11-08 RX ADMIN — ACYCLOVIR 400 MG: 200 SUSPENSION ORAL at 20:53

## 2021-11-08 RX ADMIN — ACYCLOVIR 400 MG: 200 SUSPENSION ORAL at 09:01

## 2021-11-08 RX ADMIN — LEVALBUTEROL HYDROCHLORIDE 1.25 MG: 1.25 SOLUTION RESPIRATORY (INHALATION) at 08:34

## 2021-11-08 RX ADMIN — ACETYLCYSTEINE 2 ML: 200 SOLUTION ORAL; RESPIRATORY (INHALATION) at 11:28

## 2021-11-08 RX ADMIN — HEPARIN SODIUM 800 UNITS/HR: 10000 INJECTION, SOLUTION INTRAVENOUS at 23:28

## 2021-11-08 RX ADMIN — ACETYLCYSTEINE 2 ML: 200 SOLUTION ORAL; RESPIRATORY (INHALATION) at 16:17

## 2021-11-08 RX ADMIN — NYSTATIN 1000000 UNITS: 500000 SUSPENSION ORAL at 20:53

## 2021-11-08 RX ADMIN — MYCOPHENOLATE MOFETIL 1000 MG: 200 POWDER, FOR SUSPENSION ORAL at 09:17

## 2021-11-08 RX ADMIN — FLUCONAZOLE IN SODIUM CHLORIDE 400 MG: 2 INJECTION, SOLUTION INTRAVENOUS at 12:14

## 2021-11-08 RX ADMIN — SULFAMETHOXAZOLE AND TRIMETHOPRIM 1 TABLET: 400; 80 TABLET ORAL at 08:59

## 2021-11-08 RX ADMIN — HUMAN INSULIN 3 UNITS/HR: 100 INJECTION, SOLUTION SUBCUTANEOUS at 12:10

## 2021-11-08 RX ADMIN — LEVALBUTEROL HYDROCHLORIDE 1.25 MG: 1.25 SOLUTION RESPIRATORY (INHALATION) at 11:28

## 2021-11-08 RX ADMIN — NYSTATIN 1000000 UNITS: 500000 SUSPENSION ORAL at 16:28

## 2021-11-08 RX ADMIN — PREDNISOLONE 12.5 MG: 15 SOLUTION ORAL at 20:53

## 2021-11-08 RX ADMIN — METOPROLOL TARTRATE 50 MG: 25 TABLET, FILM COATED ORAL at 20:53

## 2021-11-08 RX ADMIN — AMIODARONE HYDROCHLORIDE 200 MG: 200 TABLET ORAL at 08:58

## 2021-11-08 RX ADMIN — MAGNESIUM SULFATE IN WATER 2 G: 40 INJECTION, SOLUTION INTRAVENOUS at 04:50

## 2021-11-08 RX ADMIN — ACETYLCYSTEINE 2 ML: 200 SOLUTION ORAL; RESPIRATORY (INHALATION) at 08:34

## 2021-11-08 RX ADMIN — TACROLIMUS 2 MG: 5 CAPSULE ORAL at 18:21

## 2021-11-08 RX ADMIN — POTASSIUM CHLORIDE 40 MEQ: 40 SOLUTION ORAL at 08:56

## 2021-11-08 RX ADMIN — NYSTATIN 1000000 UNITS: 500000 SUSPENSION ORAL at 12:12

## 2021-11-08 RX ADMIN — CALCIUM CARBONATE 600 MG (1,500 MG)-VITAMIN D3 400 UNIT TABLET 1 TABLET: at 18:20

## 2021-11-08 RX ADMIN — FUROSEMIDE 20 MG: 10 INJECTION, SOLUTION INTRAVENOUS at 08:56

## 2021-11-08 RX ADMIN — Medication 40 MG: at 09:00

## 2021-11-08 RX ADMIN — METOPROLOL TARTRATE 50 MG: 25 TABLET, FILM COATED ORAL at 08:59

## 2021-11-08 RX ADMIN — MYCOPHENOLATE MOFETIL 1000 MG: 200 POWDER, FOR SUSPENSION ORAL at 20:53

## 2021-11-08 RX ADMIN — LEVALBUTEROL HYDROCHLORIDE 1.25 MG: 1.25 SOLUTION RESPIRATORY (INHALATION) at 16:17

## 2021-11-08 RX ADMIN — CALCIUM CARBONATE 600 MG (1,500 MG)-VITAMIN D3 400 UNIT TABLET 1 TABLET: at 08:59

## 2021-11-08 RX ADMIN — ACETYLCYSTEINE 2 ML: 200 SOLUTION ORAL; RESPIRATORY (INHALATION) at 20:41

## 2021-11-08 RX ADMIN — MULTIVIT AND MINERALS-FERROUS GLUCONATE 9 MG IRON/15 ML ORAL LIQUID 15 ML: at 08:58

## 2021-11-08 RX ADMIN — NYSTATIN 1000000 UNITS: 500000 SUSPENSION ORAL at 09:17

## 2021-11-08 RX ADMIN — TACROLIMUS 2 MG: 5 CAPSULE ORAL at 09:19

## 2021-11-08 RX ADMIN — POTASSIUM CHLORIDE 20 MEQ: 40 SOLUTION ORAL at 08:57

## 2021-11-08 RX ADMIN — PREDNISOLONE 12.5 MG: 15 SOLUTION ORAL at 09:00

## 2021-11-08 RX ADMIN — Medication 40 MG: at 20:53

## 2021-11-08 ASSESSMENT — ACTIVITIES OF DAILY LIVING (ADL)
ADLS_ACUITY_SCORE: 22
ADLS_ACUITY_SCORE: 25
ADLS_ACUITY_SCORE: 25
ADLS_ACUITY_SCORE: 22
ADLS_ACUITY_SCORE: 25
ADLS_ACUITY_SCORE: 25
ADLS_ACUITY_SCORE: 22
ADLS_ACUITY_SCORE: 25
ADLS_ACUITY_SCORE: 22
ADLS_ACUITY_SCORE: 23
ADLS_ACUITY_SCORE: 22
ADLS_ACUITY_SCORE: 27
ADLS_ACUITY_SCORE: 22
ADLS_ACUITY_SCORE: 22
ADLS_ACUITY_SCORE: 25
ADLS_ACUITY_SCORE: 25
ADLS_ACUITY_SCORE: 22
ADLS_ACUITY_SCORE: 25

## 2021-11-08 NOTE — PLAN OF CARE
Major Shift Events:  pt a lot more alert and responsive today. Answering simple questions, mouthing words. Pt was on pressure support via vent for 4 hours and then 2 hours, pt go tachypneic both times. A few small black BMs today, passing a lot of gas. G-tube to gravity most of the day, clamped for transplant med administration, minimal TF output from g-tube, MD aware and ok with that. Free water still 50ml q2, TF at goal today. Pt up in the chair for a few hours today.    Plan: continue pressure support as able, monitor neuro status, therapy as able. Care conference Tuesday for family update.    For vital signs and complete assessments, please see documentation flowsheets.

## 2021-11-08 NOTE — PROGRESS NOTES
Pulmonary Medicine  Cystic Fibrosis - Lung Transplant Team  Progress Note  2021       Patient: Edson Thornton  MRN: [id number]  : 1965 (age 56 year old)  Transplant: 10/16/2021 (Lung), POD#23  Admission date: 2021    Assessment & Plan:     Edson Thornton is a 56 year old male with a PMH significant for NSIP/ILD, bronchiectasis, moderate PH, RA, SALTY, chronic HSV infection, hypogammaglobulinemia, steroid-induced diabetes, hypothyroidism, PFO, HTN, HLD, duodenal anomaly, anxiety, and depression.  Admitted on 21 from OSH for acute on chronic respiratory failure 2/2 ILD exacerbation, now s/p BSLT on 10/16/21. Surgery relatively uncomplicated but pt. with low cardiac index and PGD immediately post-op.  Caroline weaned off 10/22, remains intubated, s/p trach and PEG/J tube placement with thoracic surgery 10/29.  Post-op course complicated by encephalopathy and diffuse weakness, acute to subacute CVA, afib with RVR, BRENNAN, GI bleed due to NJ/OG trauma, Candidemia/Candida empyema, and recurrent fevers.  Neuro status remains tenuous, although some improvement noted since 10/29.  Code called  due to bradycardia/asystole (required 1 round of CPR, no medications) and then progressively hypotensive, noted to have acute drop in hemoglobin and bloody G tube output.  EGD with large amount of clotted blood in stomach and area of raised mucosa near PEG tube site that was clipped, no active bleeding. Mentation slowly improving.     Today's recommendations:   - PS trials as tolerated  - Diuresis per ICU team  - CXR daily with left chest tube in place  - Tacrolimus level subtherapeutic, dose increased. Repeat level  (ordered)  - Prednisolone taper due due  (not ordered)  - CMV and EBV (, ordered)  - Continue acyclovir through through 11/15 (ordered)  - Coccidioides Ag (, ordered)  - IgG (, ordered)  - DSA (11/15, ordered)  - PHS high risk donor risk labs (11/15, ordered)  - Agree with  PEG-J tube revision/replacement to post pyloric, primary team planning to discuss timing with IR  - Recommend decreasing TF to trophic rate until feeding tube is post pyloric  - Agree with decreasing FWF given stable/improved hypernatremia  - Our team will plan to attend care conference tomorrow at 2:30pm for family update     S/p BSLT for ILD:  Acute hypoxic respiratory failure s/p trach:   Pulmonary edema:  Bilateral pleural effusions: Unfortunately had not received vaccination for flu, PNA, or COVID-19 PTA.  Explant pathology with NSIP, no malignancy.  PGD 2-3.  Weaned off paralytic 10/19 (for vent dyssynchrony) and Caroline 10/22.  Initial difficulty weaning sedation given agitation then with neurological findings as below.  CXR initially post-op with pulmonary edema, aggressively diuresed.  Recurring fevers post-op, see ID section below.  Most recent bronch 10/30 with thick secretions in the RUL and LLL and mild mucosal erythema, bilateral anastomoses intact.  Remains intubated with persistent encephalopathy as below, s/p trach and PEG/J tube placement with thoracic surgery 10/29.  Had been intermittent tolerating PS trials.  Code called 11/2 for bradycardia/asystole (required 1 round of CPR, no medications) then progressively hypotensive, GI bleed as below.  Chest CT 11/2 with increased bilateral pleural effusions (moderate left, small right), bibasilar atelectasis with area of hypoenhancing parenchyma in LLL (suspicious for infection), and numerous nondisplaced anterior rib fractures bilaterally.  S/p left chest tube placement in IR 11/3, right deferred given very little effusion on US. Tolerating PS for 4 hrs BID, limited by dyspnea.  - Nebs: levalbuterol and Mucomyst QID  - Aggressive pulmonary toilet with chest physiotherapy QID  - Ventilator management per ICU team, PS trials as tolerated  - Volume management per nephrology and ICU team  - CXR daily, today with slightly improved mixed perihilar and bibasilar  opacities/atelectasis, small bilateral pleural effusions L>R (personally reviewed with Dr. Pereira)     Immunosuppression: s/p induction therapy with basiliximab 10/16 (and high dose IV steroid) and 10/20  - Tacrolimus 1 mg qAM / 2 mg qPM (SL converted to G tube 11/6).  Goal level 8-12.  Level 11/8 subtherapeutic at 6.6, dose increased to 2 mg BID. Next level 11/11 (ordered)  - MMF 1000 mg BID (11/2, decreased given GI bleed, AZA to be avoided given TPMT)  - Prednisolone 12.5 mg BID, next taper due 11/21  (not ordered)  Date AM dose (mg) PM dose (mg)   11/7/21 12.5 12.5   11/21/21 12.5 10   12/5/21 10 10   1/2/22 10 7.5   1/30/22 7.5 7.5   2/27/22 7.5 5   3/27/22 5 5   4/24/22 5 2.5      Prophylaxis:   - Bactrim for PJP ppx (held 11/2-11/5 d/t BRENNAN)  - Nystatin for oral candidiasis ppx, 6 month course  - See below for serologies and viral ppx:    Donor Recipient Intervention   CMV status Negative Negative None, CMV monthly (ordered 11/16)   EBV status Positive Positive None, EBV monthly (ordered 11/16)   HSV status N/A Positive Acyclovir POD #1-30 through 11/15 (ordered)  (recent infection history pre-txp)      ID: Concern for possible Strongyloides exposure pre-transplant s/p ivermectin x1 dose (9/17).  Donor and recipient cultures NGTD.  S/p IV ceftazidime/vancomycin for 48h per protocol and additional empiric ceftazidime 10/19-10/23 given recurrent fevers.  Bronch cultures 10/17 with Staph epi.  Cryptococcal Ag negative 10/23.    - Monthly Coccidioides Ag x12 months post-transplant per ID (urine and serum negative 10/17), due 11/17 (ordered)     Recurring fevers:  Fevers post-op, Tmax 101.7 POD #1.  Febrile with worsening leukocytosis again 10/25, generally persisting.  Trach sputum culture (10/25) with GPC, normal jean marie.  LP (10/29), xanthochromic with pleocytosis thought to be appropriate given RBC and WBCs, no ABX recommended per transplant ID and neurology.  Bronch culture (10/30) with GPC in clusters, normal  jean marie.  S/p empiric meropenem 10/28-11/2.  - Trach sputum culture (11/2) with Staph epi, treatment not indicated per ID and clinically improving     Disseminated Candida: Noted on blood cultures 10/20 and 10/22.  BDG fungitell positive (399) on 10/20.  Respiratory cultures with persistent Candida albicans.  TC 10/23 without evidence of endocarditis.  Ophthalmology consult 10/24 with benign dilated fundoscopic exam.  Candida empyema also noted 10/25, chest tubes inadvertently removed by CVTS 10/28, left chest tube replaced by IR 11/3 as above.  - Repeat blood cultures (10/23, 10/25, 11/2) NGTD   - Pleural cultures (11/3) Candida, pending   - Fluconazole (10/26-11/25 per ID, prior micafungin 10/22-10/27),      HSV: Chronic intermittent active infection pre-transplant with recent HSV infection: crusted lesions throughout left side of jaw, s/p 10 day treatment course of ACV through 10/9.  HSV PCR blood negative 10/17.  - ACV ppx as above (started POD #1 instead of POD #8 given HSV history and location)     Hypogammaglobulinemia: IgG previously low at 364 (9/7).  Noted at 265 at time of transplant, s/p IVIG with premedication 10/21.    - Repeat IgG 11/17 (ordered)     Positive cross match: Note that he received two doses of rituximab in June, which is likely contributing to cross match result.  DSA most recently negative 11/1.  - DSA monitoring q2 weeks (11/15, ordered)     PHS risk criteria donor:  Additional labs required post-transplant (between 4-8 weeks post-op): Hepatitis B, Hepatitis C, and HIV by VISHAL (ordered 11/15).     SALTY: Noted pre-transplant.  Home CPAP 6-12 cm H2O.  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  MRI (10/23) with multifocal subacute infarcts within both cerebral hemispheres and left cerebellum.  DDx include surgery v embolic v infectious (see above regarding ID work up).  Heparin drip started 10/23.   Repeat stroke code 10/25 d/t marked decrease in responsiveness with sedation wean, pupil inequality, and absent gag reflex. CTA head without obvious new pathology, MRI brain (with and without contrast) primarily revealing for infarct, low likelihood of PRES. Ammonia normal.  VEEG per neuro with severe diffuse encephalopathy.  Etiology of CVA likely 2/2 afib, PFO, or perioperative.  Some improvement noted since 10/29, repeat head CT 11/2 (following code) without new acute intracranial abnormalities.  - AC management per primary team     Afib with RVR: Noted 10/18, started on amiodarone drip and transitioned to PO 10/21, dose decreased 10/29 (anticipate 4 week course).  Currently in SR.  Heparin drip as above.  Metoprolol resumed 11/3.    Right subclavian DVT: Seen on UE US from 11/4. Low-intensity heparin was re-started on 11/6, no further signs of bleeding.        Recurrent GI bleed: Hemoglobin dropped to 6.6 10/22, s/p 2 units pRBC.  OG tube with bloody output, EGD 10/23 noted NJ/OG tube trauma with scant oozing.  IV PPI BID.  Progressive hypotensive 11/2, hemoglobin 5.8 with bloody G tube output.  Heparin drip held, transitioned to PPI drip, and MTP activated.  EGD 11/2 with large amount of clotted blood in stomach and area of raised mucosa with small adherent clot near PEG tube site that was clipped, no active bleeding.  Abdominal CT 11/2 with stomach distended with hyperdense fluid suggestive of blood products and dilated small bowel.  Maroon stools 11/3, repeat EGD with extensive old blood in stomach, no active bleeding, small nodular area with prior clips clipped again. Repeat EGD (11/5) with ulcer noted at PEG-bumper site, gastritis, and suction marks from G tube, and G-J arm is  in the duodenum. S/p Reglan q8h x 24h, repeat AXR with tube remaining in duodenum. G tube with tube feed noted in drainage (large amount), AXR with no obstruction.   - Agree with PEG-J tube revision/replacement to post pyloric, primary team planning to discuss with IR.  - Recommend decreasing TF to trophic rate until feeding tube is post pyloric     BRENNAN, Improving:   Hyperkalemia: Baseline creatinine 0.7.  BRENNAN noted POD #1, UO also downtrending.  Improved with aggressive diuresis, Cr worsened again (1.38 on 11/2) along with up trending BUN and hyperkalemia.  Nephrology consulted 11/1, thought to be prerenal/component of ATN.  - Management per nephrology and primary team     Elevated LFTs: Shock liver post-op.  Initial ALT//230 evening of surgery, then with marked increase to 1550/1246 POD #1.  Had improved although now with acute rise again likely in setting of shock, again improving.     We appreciate the excellent care provided by the CVICU and CVTS teams.  Recommendations communicated via in person rounding and this note.  Will continue to follow along closely, please do not hesitate to call with any questions or concerns.     Patient discussed with Dr. Kelli Jeffrey, APRN, CNP   Inpatient Nurse Practitioner  Pulmonary CF/Transplant  Pager #[phone]       Subjective & Interval History:     More alert today. Able to follow simple commands except right hand squeeze but is able to move RUE Communicating by head nod/shake. PS 7/5 40% for 4 hrs x2 yesterday. Otherwise on CMV 12/400/5/40, Frequent suctioning q1 hour with creamy secretions. TF at goal rate but with large volume of TF in G drainage bag.  Denies pain or nausea.  Breathing with SOB and chest congestion. Small stools still with melena, +gas.     Review of Systems:     ROS as above otherwise significantly limited due to trach and encephalopathy    Physical Exam:     Vital signs:  Temp: 97.1  F (36.2  C) Temp src: Axillary BP: (!) 154/95  "Pulse: 85   Resp: 20 SpO2: 99 % O2 Device: Mechanical Ventilator Oxygen Delivery: 15 LPM Height: 162.6 cm (5' 4\") Weight: 71 kg (156 lb 8.4 oz)  I/O:     Intake/Output Summary (Last 24 hours) at 11/8/2021 1416  Last data filed at 11/8/2021 1300  Gross per 24 hour   Intake 1829.12 ml   Output 1680 ml   Net 149.12 ml     Constitutional: lying in bed, in no apparent distress.   HEENT: Eyes with pink conjunctivae, anicteric, PERRL.  Oral mucosa moist without lesions.  Neck supple without lymphadenopathy.   PULM: Good air flow bilaterally.  No crackles, no rhonchi, no wheezes.  Non-labored breathing on PS 7/5 40%.  CV: Normal S1 and S2.  RRR.  No murmur, gallop, or rub.  No peripheral edema.   ABD: NABS, soft, nontender, nondistended.    MSK: Moves all extremities.  No apparent muscle wasting.   NEURO: Conversant by head shake/nod and follows commands  SKIN: Warm, dry.  No rash on limited exam.   PSYCH: Mood stable.     Lines, Drains, and Devices:  Peripheral IV 11/02/21 Anterior;Distal;Right Lower forearm (Active)   Site Assessment [facility] 11/08/21 0400   Line Status Infusing 11/08/21 0400   Phlebitis Scale 0-->no symptoms 11/08/21 0400   Infiltration Scale 0 11/08/21 0400   Infiltration Site Treatment Method  None 11/05/21 0400   Number of days: 6       Peripheral IV 11/02/21 Anterior;Right Upper forearm (Active)   Site Assessment [facility] 11/08/21 0400   Line Status Infusing 11/08/21 0400   Dressing Intervention New dressing  11/08/21 0400   Phlebitis Scale 0-->no symptoms 11/08/21 0400   Infiltration Scale 0 11/08/21 0400   Infiltration Site Treatment Method  None 11/05/21 0400   Number of days: 6       PICC Triple Lumen 11/04/21 Left Basilic Access. PICC okay to use. (Active)   Site Assessment [facility] 11/08/21 0400   External Cath Length (cm) 1 cm 11/04/21 1747   Extremity Circumference (cm) 28 cm 11/04/21 1747   Dressing Intervention Transparent;Chlorhexidine patch 11/08/21 0400   Dressing Change Due 11/14/21 11/07/21 2000 "   Gray - Status saline locked 11/08/21 0400   Gray - Cap Change Due 11/09/21 11/07/21 2000   Red - Status saline locked 11/08/21 0400   Red - Cap Change Due 11/09/21 11/07/21 2000   White - Status infusing 11/08/21 0400   White - Cap Change Due 11/09/21 11/07/21 2000   Extravasation? No 11/07/21 2000   Line Necessity Yes, meets criteria 11/08/21 0400   Number of days: 4     Data:     LABS    CMP:   Recent Labs   Lab 11/08/21  1314 11/08/21  1313 11/08/21  1208 11/08/21  1003 11/08/21  0346 11/08/21  0344 11/07/21  1808 11/07/21  1612 11/07/21  0626 11/07/21  0518 11/06/21  0557 11/06/21  0438 11/05/21  0508 11/05/21  0337   *  --   --   --   --  146*  146*  --  144  --  146*  146*   < > 144  144   < > 151*   POTASSIUM 4.7  --   --   --   --  3.9  3.9  --  4.2  --  3.5  3.5   < > 3.9  3.9   < > 4.1   CHLORIDE 115*  --   --   --   --  115*  115*  --  116*  --  114*  114*   < > 114*  114*   < > 123*   CO2 27  --   --   --   --  28  28  --  25  --  26  26   < > 24  24   < > 24   ANIONGAP 3  --   --   --   --  3  3  --  3  --  6  6   < > 6  6   < > 4   * 124* 107* 150*   < > 152*  152*   < > 139*   < > 113*  113*   < > 190*  190*   < > 130*   BUN 26  --   --   --   --  31*  31*  --  29  --  29  29   < > 36*  36*   < > 60*   CR 0.54*  --   --   --   --  0.56*  0.56*  --  0.60*  --  0.58*  0.58*   < > 0.62*  0.62*   < > 0.71   GFRESTIMATED >90  --   --   --   --  >90  >90  --  >90  --  >90  >90   < > >90  >90   < > >90   ERIK 8.3*  --   --   --   --  8.6  8.6  --  8.4*  --  8.3*  8.3*   < > 8.4*  8.4*   < > 8.2*   MAG 2.2  --   --   --   --  1.8  --   --   --  1.9  --  1.6  --  1.9   PHOS  --   --   --   --   --  2.8  --   --   --  2.3*  --  2.9  --  3.0   PROTTOTAL  --   --   --   --   --  4.6*  --   --   --  4.6*  --  5.1*  --  4.9*   ALBUMIN  --   --   --   --   --  1.7*  --   --   --  1.8*  --  1.9*  --  1.8*   BILITOTAL  --   --   --   --   --  0.2  --   --   --  0.3  --  0.5   --  0.5   ALKPHOS  --   --   --   --   --  134  --   --   --  134  --  149  --  130   AST  --   --   --   --   --  <3  --   --   --  6  --  42  --  59*   ALT  --   --   --   --   --  78*  --   --   --  107*  --  176*  --  226*    < > = values in this interval not displayed.     CBC:   Recent Labs   Lab 11/08/21  0344 11/07/21  0518 11/06/21 0438 11/05/21  1624   WBC 16.5* 13.4* 19.7* 17.5*   RBC 2.94* 2.89* 3.21* 2.95*   HGB 9.0* 8.8* 9.5* 9.0*   HCT 28.4* 27.8* 30.4* 28.4*   MCV 97 96 95 96   MCH 30.6 30.4 29.6 30.5   MCHC 31.7 31.7 31.3* 31.7   RDW 18.2* 17.9* 18.0* 18.3*    180 176 161       INR:   Recent Labs   Lab 11/08/21  0344 11/07/21  0518 11/06/21  0438 11/05/21  0337   INR 1.18* 1.18* 1.26* 1.27*       Glucose:   Recent Labs   Lab 11/08/21  1314 11/08/21  1313 11/08/21  1208 11/08/21  1003 11/08/21  0847 11/08/21  0653   * 124* 107* 150* 99 134*       Blood Gas:   Recent Labs   Lab 11/07/21  0626 11/06/21  0438 11/05/21  0504 11/02/21  0826 11/02/21  0815   PHV 7.45*  --   --   --  7.23*   PCO2V 40  --   --   --  53*   PO2V 38  --   --   --  25   HCO3V 28  --   --   --  22   LORI 3.3*  --   --   --  -5.4   O2PER 40 40 40   < > 100    < > = values in this interval not displayed.       Culture Data No results for input(s): CULT in the last 168 hours.    Virology Data:   Lab Results   Component Value Date    FLUAH1 Negative 10/17/2021    FLUAH3 Negative 10/17/2021    CY9325 Negative 10/17/2021    IFLUB Negative 10/17/2021    RSVA Negative 10/17/2021    RSVB Negative 10/17/2021    PIV1 Negative 10/17/2021    PIV2 Negative 10/17/2021    PIV3 Negative 10/17/2021    HMPV Negative 10/17/2021    HRVS Negative 10/17/2021    ADVBE Negative 10/17/2021    ADVC Negative 10/17/2021       Historical CMV results (last 3 of prior testing):  Lab Results   Component Value Date    CMVQNT Not Detected 10/26/2021     No results found for: CMVLOG    Urine Studies    Recent Labs   Lab Test 11/01/21  1336  10/25/21  1507   URINEPH 5.5 5.5   NITRITE Negative Negative   LEUKEST Negative Negative   WBCU 0 2       Most Recent [facility] Pulmonary Function Testing (FVC/FEV1 only)  No results found for: 20002  No results found for: 20003  No results found for: 20015  No results found for: 20016    IMAGING    Recent Results (from the past 48 hour(s))   XR Chest Port 1 View    Narrative    Exam: XR CHEST PORT 1 VIEW, 11/7/2021 2:05 AM    Indication: s/p lung transplant    Comparison: 11/6/2021    Findings:   Tracheostomy is unchanged. Postsurgical changes of the chest with  clamshell sternotomy wires similar to prior. Left PICC unchanged. Left  basilar chest tube unchanged.     Hazy bibasilar airspace opacities are unchanged. No appreciable  pneumothorax. Trace bilateral pleural effusions. Cardiac silhouette is  unchanged. Unchanged bilateral rib fractures.      Impression    Impression: Stable fetal small bilateral pleural effusions with  adjacent atelectasis.    I have personally reviewed the examination and initial interpretation  and I agree with the findings.    MAURICIO NAVAS MD         SYSTEM ID:  [id number]   XR Chest Port 1 View    Narrative    EXAMINATION:  XR CHEST PORT 1 VIEW 11/8/2021 12:51 AM.    COMPARISON: 11/7/2021.    HISTORY:  s/p lung transplant    FINDINGS: Surgical changes of bilateral lung transplant with clamshell  median sternotomy wires and overlying skin staples. Left PICC tip  projects over the innominate-SVC confluence, stable. Tracheostomy tube  tip projects over the mid trachea. Left pigtail catheter is stable in  position. Cardiomediastinal silhouette is stable. Pulmonary  vasculature is indistinct. Unchanged small left and trace right  pleural effusions. No significant change in the mixed perihilar and  bibasilar pulmonary opacities. No pneumothorax. Partially visualized  percutaneous GJ tube in the upper abdomen.      Impression    IMPRESSION:   1. Stable postsurgical changes of bilateral lung  transplant. No  significant change in the mixed perihilar and bibasilar pulmonary  opacities, likely representing atelectasis.  2. Stable small left and trace right pleural effusions.    I have personally reviewed the examination and initial interpretation  and I agree with the findings.    KAMLA SHIN MD         SYSTEM ID:  [id number]   XR Abdomen Port 1 View    Narrative    EXAMINATION:  XR ABDOMEN PORT 1 VIEWS 11/8/2021 12:53 AM.    COMPARISON: 11/5/2021.    HISTORY:  placement of PEGJ    FINDINGS: Supine view of the abdomen. Percutaneous gastrojejunostomy  tube with tip projecting over the proximal duodenum. Catheter tubing  loops in the fundus. Left basilar chest tube. Nonobstructive bowel gas  pattern. Hemostatic clips projecting over the stomach. Osseous  structures are within normal limits. Please refer to same-day  radiograph of the chest for findings in the lung bases.      Impression    IMPRESSION:   1. Percutaneous gastrojejunostomy tube with distal tip projecting over  the proximal duodenum.  2. Nonobstructive bowel gas pattern.    I have personally reviewed the examination and initial interpretation  and I agree with the findings.    KAMLA SHIN MD         SYSTEM ID:  [id number]

## 2021-11-08 NOTE — PROGRESS NOTES
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 Corral. 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  UGIB causing code blue on 11/2.     OVERNIGHT EVENTS:  naeo     TODAY'S PROGRESS:   lantus 10  Diurese  statin    PLAN:  Neuro/ pain/ sedation:  Acute postoperative pain   Anxiety and depression  Acute to subacute CVA, b/l cerebral hemispheres and L cerebellum, suspect embolic  Encephalopathy and diffuse weakness - improving slightly   R ear lateral canal floor excoriation with bleeding - resolved   - APAP and oxycodone prn, lido patch   - appreciate ENT  - appreciate neuro    Pulmonary care:   SALTY on home CPAP  Acute hypoxic respiratory failure s/p b/l lung transplant 10/16/21  S/p tracheostomy 10/29  Empyema with Candida s/p chest tube 11/3/21  - mechanically ventilated via trach, PST   - Levalbuterol nebs and Mucomyst, chest PT  - Daily CXR  - appreciate Pulmonary  - Diurese    Cardiovascular:    HTN  Afib   PFO  Vasoplegic shock in setting of hypovolemia - resolved  - Monitor hemodynamic status.   - MAP goal <100, SBP <140  - metoprolol 50 BID    - prn labetalol  - statin  - Amiodarone 200 mg daily   - low-intensity heparin gtt     GI care/ Nutrition:   Elevated LFTs - recurrent  GI bleed 2/2 NG trauma, now recurrent secondary to GJ bumper ulcer   Moderate malnutrition in the context of acute on chronic illness  PEG-J, with malpositioned J tube   - Diet: tf    - PPI bid    Renal/ Fluid Balance/ Electrolytes:   Acute kidney injury, recurrent, now with azotemia - resolving   Hypernatremia  Hyperkalemia, resolved   BL creat appears to be ~ 0.7  - free water flushes  - Strict I/O, daily weights  -  Avoid/limit nephrotoxins as able  - Replete lytes PRN per protocol     Endocrine:    Stress induced hyperglycemia with steroid-induced component, on DM2  Hypothyroidism  RA  Preop A1c 6.6  - insulin gtt   - Goal BG <180 for optimal healing  - continue home synthroid  - lantus     ID/ Antibiotics:  Immunosuppression s/p transplant  Candidal infection of donor lungs (likely source), pleural fluid, and fungemia   - NGTD CSF. Last + blood cx 10/22. No vegetations on valves per TC 10/23  - Nystatin, Acyclovir, bactrim   - Tacrolimus (via g tube), prednisone    - appreciate transplant ID, ophthalmology    - d/w ID, no indication to treat Staph epi in sputum   - Fluconazole (end date 11/25)    Heme:     Acute blood loss anemia secondary to surgery and GI bleed, and anemia related to critical illness and iron deficiency    Hypogammaglobulinemia s/p IVIG  Thrombocytopenia, HIT negative - resolved   Lactic acidosis, resolved  Nonocclusive R subclavian thrombus    - heparin low intensity     MSK/ Skin:  Clamshell surgical incision  - Sternal precautions  - Postoperative incision management per protocol  - PT/OT/CR     Prophylaxis:    - Mechanical prophylaxis for DVT: SCDs  - Chemical DVT prophylaxis: heparin low intensity gtt    - PPI for 30 days postoperatively     Lines/ tubes/ drains:  - trach  - peg-j  - Watson 10/25  - PIVs  - chest tube L  - L PICC     Disposition:  - CVICU    Care conference to update family to be planned for this week    Patient seen, findings and plan discussed with CVICU staff.     Moises Ribera  PGY-3 Anesthesiology      ====================================    SUBJECTIVE:   naeo    OBJECTIVE:   1. VITAL SIGNS:   Temp:  [97.5  F (36.4  C)-98  F (36.7  C)] 97.6  F (36.4  C)  Pulse:  [] 76  Resp:  [19-31] 22  BP: (123-167)/(62-95) 140/77  FiO2 (%):  [40 %] 40 %  SpO2:  [96 %-100 %] 100 %  Ventilation Mode: CMV/AC  (Continuous Mandatory Ventilation/ Assist Control)  FiO2 (%): 40 %  Rate Set  (breaths/minute): 12 breaths/min  Tidal Volume Set (mL): 400 mL  PEEP (cm H2O): 5 cmH2O  Pressure Support (cm H2O): 7 cmH2O  Oxygen Concentration (%): 40 %  Resp: 22    2. INTAKE/ OUTPUT:   I/O last 3 completed shifts:  In: 2554.2 [I.V.:814.2; NG/GT:810]  Out: 1925 [Urine:1550; Emesis/NG output:285; Chest Tube:90]    3. PHYSICAL EXAMINATION:   General: NAD  Neuro: opens eyes to voice, wiggles toes BLE to command, no movement BUE to noxious, grimacing only to noxious of L hand and not to R - stable exam from yesterday    Resp: vented via trach, nonlabored   CV: RRR on tele   Abdomen: soft, nontender, rounded   Incisions: c/d/i  Extremities: warm and well perfused, no edema    4. INVESTIGATIONS:   Arterial Blood Gases   Recent Labs   Lab 11/06/21  0438 11/05/21  0504 11/05/21  0336 11/04/21  1016   PH 7.43 7.49* 7.49* 7.46*   PCO2 37 31* 32* 35   PO2 109* 126* 331* 95   HCO3 25 24 24 25     Complete Blood Count   Recent Labs   Lab 11/08/21  0344 11/07/21  0518 11/06/21  0438 11/05/21  1624   WBC 16.5* 13.4* 19.7* 17.5*   HGB 9.0* 8.8* 9.5* 9.0*    180 176 161     Basic Metabolic Panel  Recent Labs   Lab 11/08/21  0552 11/08/21  0346 11/08/21  0344 11/08/21  0154 11/07/21  1808 11/07/21  1612 11/07/21  0626 11/07/21  0518 11/06/21  1613 11/06/21  1611   NA  --   --  146*  146*  --   --  144  --  146*  146*  --  144   POTASSIUM  --   --  3.9  3.9  --   --  4.2  --  3.5  3.5  --  3.5   CHLORIDE  --   --  115*  115*  --   --  116*  --  114*  114*  --  114*   CO2  --   --  28  28  --   --  25  --  26  26  --  26   BUN  --   --  31*  31*  --   --  29  --  29  29  --  31*   CR  --   --  0.56*  0.56*  --   --  0.60*  --  0.58*  0.58*  --  0.58*   * 137* 152*  152* 134*   < > 139*   < > 113*  113*   < > 122*    < > = values in this interval not displayed.     Liver Function Tests  Recent Labs   Lab 11/08/21  0344 11/07/21  0518 11/06/21  0438 11/05/21  0337   AST <3 6 42 59*   ALT 78* 107* 176* 226*    ALKPHOS 134 134 149 130   BILITOTAL 0.2 0.3 0.5 0.5   ALBUMIN 1.7* 1.8* 1.9* 1.8*   INR 1.18* 1.18* 1.26* 1.27*     Pancreatic Enzymes  No lab results found in last 7 days.  Coagulation Profile  Recent Labs   Lab 11/08/21  0344 11/07/21  0518 11/06/21  0438 11/05/21  0337 11/03/21  0407 11/02/21  1053 11/02/21  0927 11/02/21  0908 11/02/21  0832   INR 1.18* 1.18* 1.26* 1.27*   < > 1.55*  --    < > 1.38*   PTT  --   --   --   --   --  34 32  --  50*    < > = values in this interval not displayed.        =========================================

## 2021-11-08 NOTE — PROGRESS NOTES
THORACIC SURGERY PROGRESS NOTE     S: Patient is awake. Per nursing most of the tube feeds are coming out of the G tube which is on gravity. Over the past day patient had about 300 out of G tube when his feeds were about a total of 360.      O:  Patient Vitals for the past 24 hrs:   BP Temp Temp src Pulse Resp SpO2   11/08/21 0700 (!) 159/79 -- -- 86 26 100 %   11/08/21 0600 137/75 -- -- 72 18 100 %   11/08/21 0500 (!) 140/77 -- -- 76 24 100 %   11/08/21 0400 128/64 97.6  F (36.4  C) Axillary 71 22 100 %   11/08/21 0300 (!) 147/77 -- -- 82 22 100 %   11/08/21 0200 (!) 151/76 -- -- 81 26 100 %   11/08/21 0134 -- -- -- -- -- 98 %   11/08/21 0100 123/80 -- -- 81 -- 99 %   11/08/21 0000 (!) 146/89 97.8  F (36.6  C) Axillary 83 19 99 %   11/07/21 2300 (!) 158/84 -- -- 85 22 99 %   11/07/21 2200 (!) 142/80 -- -- 82 20 99 %   11/07/21 2149 -- -- -- -- -- 100 %   11/07/21 2100 139/88 -- -- 81 24 98 %   11/07/21 2000 (!) 144/86 97.5  F (36.4  C) Axillary 85 22 100 %   11/07/21 1900 (!) 151/89 -- -- 103 -- 99 %   11/07/21 1832 (!) 149/90 -- -- -- -- --   11/07/21 1800 (!) 149/90 -- -- 96 24 99 %   11/07/21 1700 126/79 -- -- 87 24 99 %   11/07/21 1600 137/78 98  F (36.7  C) Axillary 94 24 99 %   11/07/21 1539 (!) 157/91 -- -- 98 -- 98 %   11/07/21 1500 (!) 157/91 -- -- 95 22 98 %   11/07/21 1400 135/87 -- -- 85 22 97 %   11/07/21 1300 137/79 -- -- 84 24 98 %   11/07/21 1217 (!) 158/75 -- -- -- -- --   11/07/21 1200 (!) 156/95 97.9  F (36.6  C) Axillary 85 (!) 31 99 %   11/07/21 1138 (!) 156/62 -- -- 86 -- 98 %   11/07/21 1100 (!) 159/95 -- -- 80 24 96 %   11/07/21 1000 (!) 167/78 -- -- 78 26 97 %   11/07/21 0900 (!) 152/67 -- -- 69 26 98 %   11/07/21 0800 133/77 97.6  F (36.4  C) Axillary 73 23 99 %   11/07/21 0749 (!) 165/90 -- -- 81 -- 98 %        Gen:  Awake and in no acute distress  Resp: non labored breathing, minimal dry blood around trach  CV: RRR  Abd: PEGJ site looks well, no evidence of infection, G-tube to gravity      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 We were consulted to place a PEGJ on 10/29        - Patient currently on TF at 45, with reflux into the G  - G port to gravity  -We will obtain a repeat abdominal x-ray this morning which shows the J tube is more proximal than it was a few days ago  - Will reach out to IR again for repositioning  - Rest of care per primary team        Seen with thoracic fellow, who will discuss with staff.     Vincent Maravilla MD  Surgery PGY1  Pager [phone]

## 2021-11-08 NOTE — PROGRESS NOTES
11/08/21 1345   Quick Adds   Type of Visit Initial Occupational Therapy Evaluation   Living Environment   People in home significant other   Current Living Arrangements house   Home Accessibility stairs to enter home;stairs within home   Number of Stairs, Main Entrance 2   Stair Railings, Main Entrance none   Number of Stairs, Within Home, Primary greater than 10 stairs  (12)   Stair Railings, Within Home, Primary railing on right side (ascending)   Living Environment Comments Information confirmed from prior PT eval. Pt's bedroom is on upper level, reports having bathroom on main and upper level. Walk in shower, no GB present.    Self-Care   Usual Activity Tolerance fair   Current Activity Tolerance poor   Regular Exercise No   Equipment Currently Used at Home other (see comments)  (doesn't have any AD/AE at home, uses home O2)   Activity/Exercise/Self-Care Comment Pt reports SO does not work, able to assist PRN. Pt reports increasing SOB prior to admission. SO was helping with LB dressing and showering. Pt using O2 at baseline. Did not drive at baseline. States he enjoys sports, does not elaborate on which sports or teams. Does not identify other interests.    Instrumental Activities of Daily Living (IADL)   Previous Responsibilities medication management;laundry;meal prep;housekeeping   IADL Comments Had outside assist for yardwork. Pt reports he did some cooking/cleaning PTA and his own laundry (washer and dryer are in the basement).    Disability/Function   Hearing Difficulty or Deaf no   Wear Glasses or Blind yes   Vision Management glasses, not currently wearing   Concentrating, Remembering or Making Decisions Difficulty no   Difficulty Communicating no   Difficulty Eating/Swallowing no   Walking or Climbing Stairs Difficulty yes   Walking or Climbing Stairs ambulation difficulty, requires equipment;stair climbing difficulty, requires equipment   Mobility Management O2 + rests   Dressing/Bathing Difficulty  yes   Dressing/Bathing bathing difficulty, assistance 1 person   Dressing/Bathing Management recently requiring assist for LB dressing and showering PTA   Toileting issues no   Doing Errands Independently Difficulty (such as shopping) yes   Errands Management Peg was doing all errands   Fall history within last six months no   Change in Functional Status Since Onset of Current Illness/Injury yes   General Information   Onset of Illness/Injury or Date of Surgery 09/05/21   Referring Physician Fred Osei MD   Patient/Family Therapy Goal Statement (OT) Not stated   Additional Occupational Profile Info/Pertinent History of Current Problem Edson Thornton is a 56 year old male with PMH ILD and rheumatoid lung disease, RA, SALTY, hypothyroid, HTN, anxiety and depression, HLD, 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 Corral. 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  UGIB causing code blue on 11/2.   Existing Precautions/Restrictions fall;sternal   Limitations/Impairments safety/cognitive   Left Upper Extremity (Weight-bearing Status)   (10#)   Right Upper Extremity (Weight-bearing Status)   (10#)   General Observations and Info trach, VC/AC setting, FiO2 40%, PEEP 5. Up in Broda chair upon writer approach.   Cognitive Status Examination   Cognitive Status Comments Flat affect. Unable to state month, knows it is 2021. Able to state where he lives. Able to answer majority (~90%) of simple yes/no questions, having difficulty squeezing L hand to command consistently. Will continue to assess.    Visual Perception   Impact of Vision Impairment on Function (Vision) Wears glasses. denies blurriness/double vision.    Sensory   Sensory Comments pt reports intermittent n/t in R UE/LE   Integumentary/Edema   Integumentary/Edema no deficits were identifed   Range of Motion Comprehensive    Comment, General Range of Motion R UE: muscle contraction palpated at 1st digit and elbow, no muscle contraction palpated at wrist or shoulder. L UE: weak/inconsistent grasp to command, weak wrist flex/ext, muscle contraction palpated at elbow x 1, did not palpate muscle contraction for shoulder flexion or abduction.    Strength Comprehensive (MMT)   Comment, General Manual Muscle Testing (MMT) Assessment B UE < 3/5   Transfers   Transfer Comments dependent   Bathing Assessment   Comment (Bathing) dependent   Upper Body Dressing Assessment   Comment (Upper Body Dressing) dependent   Lower Body Dressing Assessment   Comment (Lower Body Dressing) dependent   Toileting   Comment (Toileting) dependent   Clinical Impression   Criteria for Skilled Therapeutic Interventions Met (OT) yes   OT Diagnosis Decreased ADL I   OT Problem List-Impairments impacting ADL problems related to;activity tolerance impaired;balance;cognition;communication;mobility;range of motion (ROM);sensation;strength;pain;post-surgical precautions;inability to direct their own care   ADL comments/analysis Decreased ADL I   Assessment of Occupational Performance 5 or more Performance Deficits   Identified Performance Deficits bathing, dressing, toileting, g/h, functional mobility, functional transfers, home management   Planned Therapy Interventions (OT) ADL retraining;cognition;fine motor coordination training;neuromuscular re-education;motor coordination training;ROM;strengthening;stretching;transfer training;home program guidelines;progressive activity/exercise;risk factor education   Clinical Decision Making Complexity (OT) low complexity   Therapy Frequency (OT) 4x/week   Predicted Duration of Therapy 2-3 weeks   Anticipated Equipment Needs Upon Discharge (OT)   (TBD)   Risk & Benefits of therapy have been explained evaluation/treatment results reviewed;care plan/treatment goals reviewed   Comment-Clinical Impression Pt to benefit from skilled OT  to address above deficits. See daily flowsheet for details regarding tx provided.    Total Evaluation Time (Minutes)   Total Evaluation Time (Minutes) 5

## 2021-11-08 NOTE — PLAN OF CARE
Major Shift Events: Pt alert this shift, nodding yes and no appropriately, but only oriented to self. Follows commands and moving all extremities legs>arms. Shiley 6 trach, CMV settings all night. Pt coughing frequently and requiring suction q1-2h. NSR on tele, BP stable. Borderline UOP per MD domenica aware. Small BM. TF running at goal. G-tube to gravity with large amounts of gas and TF coming out. MD aware. Abdominal xray done this AM.     Plan: Monitor neuro status, PS on vent as able, reposition G/J tube, work with therapy.     For vital signs and complete assessments, please see documentation flowsheets.

## 2021-11-08 NOTE — CONSULTS
Care Management Follow Up    Length of Stay (days): 64    Expected Discharge Date:  TBD     Concerns to be Addressed:  Care Conference     Patient plan of care discussed at interdisciplinary rounds: Yes    Additional Information:  Pr family requested care conf. arranged for update.  Care conf. arranged for tomorrow, 11/9 at 2:30 in 4C conf. Room.  Team members that have been informed about the care conf. Are: CVICU, (Dr. Coffey #[id number]), Pulmonary ( Dr. Mckeon # [id number]), transplant SW ( Leisa Navarro # [id number]) bedside RN, Paramjit and charge nurse.  RNCC contacted pt dtr, Zenia # [phone] and informed her about the care conf. Date and time.  Pt dtr agreed with the plan.  RNCC shared conf. Call in number # [phone], ID- [id number],  PIN- [phone].  All family members will be on the speaker phone.  Pt dtr agreed to share the number with all the family members      Care conf. Tomorrow, 11/9 at 2:30.  Providers meeting at 2:15 in 4C conf. Room, prior meeting family.      Jennifer Pate RN, PHN, BSN  4A and 4E/ ICU  Care Coordinator  Phone: [phone]  Pager: [phone]    To get in touch with weekend & Holiday on call RN Care Coordinator  Page [phone] or Care Coordinator Job code/pager- [phone]

## 2021-11-08 NOTE — CONSULTS
Patient is a 56 year old male with surgical GJ tube placed 10/29/21. Recent abdominal X-ray (9/5/21) notable for GJ tube malposition, with tube withdrawn into the gastric fundus. Distal tip of tube appears post pyloric. Patient's team requesting GJ exchange.    Due to the recent placement, GJ tube exchange contraindicated due to concern for leakage of gastric contents into the abdomen. IR protocol is to defer intervention until 6 weeks post placement.     Recommend smaller more frequent bolus delivery or GI consultation for possible endoscopic repositioning.     Case discussed with primary team and IR attending physician (Dr. Dow).    Jesse Carter PA-C  Interventional Radiology  [phone] pgr.

## 2021-11-08 NOTE — PROGRESS NOTES
Physician Attestation   I, Jannette Pereira, saw and evaluated Edson Thornton as part of a shared visit.  I have reviewed and discussed with the advanced practice provider their history, Jeimy Jeffrey, physical and plan.     I personally reviewed the vital signs, medications, labs, and imaging.     My key history or physical exam findings: lying in bed, eyes open, interactive, in no acute distress, anicteric sclerae, MMM, RRR, no mrg, normocardic, normal breathing pattern, lungs clear to auscultation b/l but diminished in bases, no wheezes, rales, rhonchi, abdomen soft, non-distended, NABS, lower extremities with no edema, extremities WWP, thoracotomy wound intact, wiggling toes on command, following other commands, moving extremities, mood and affect appropriate        Key management decisions made by me:   Bret Thornton is a 57 yo male with medical history significant for NSIP, bronchiectasis, pulmonary hypertension, rheumatoid arthritis, SALTY, hypothyroidism, hypertension, anxiety and depression who was admitted on 9/5/2021 for acute on chronic respiratory failure thought due to ILD exacerbation and who underwent bilateral sequential lung transplant on 10/16/2021 with an uncomplicated course.  Has postoperative course has been complicated by PGD, reduced cardiac index, CVA, encephalopathy, A. fib with RVR, BRENNAN, GI bleed, and he has now been found to have candidemia.     Patient developed a bradycardic/asystolic arrest on 11/2 and was found to have a massive GIB thought 2/2 mucosal lesion near PEG/J; this area has now been clipped x 2 with no signs of ongoing active bleeding. He is awakening and able to follow commands (L-handed thumbs up, moving all extremities, answering yes/no questions with nod). PEG/J with distal tip in duodenum and IR is consulted for repositioning; also significant FT output from G tube so have reduced TFs to 20 ml/hr and have started a motility agent, senna/docusate.     He continues on  fluconazole through at least 11/25 and we will continue 3 drug immunosuppression with tacrolimus, MMF, prednisone. Continue bactrim and ACV for ppx. Increase PST to TID with goal to wean to trach dome.  Care conference planned for 11/9 at 2:30 pm.     Jannette Pereira MD  Pulmonary, Allergy, Critical Care, and Sleep Medicine   [facility]   Pager: [phone]

## 2021-11-09 ENCOUNTER — APPOINTMENT (OUTPATIENT)
Dept: GENERAL RADIOLOGY | Facility: CLINIC | Age: 56
End: 2021-11-09
Attending: STUDENT IN AN ORGANIZED HEALTH CARE EDUCATION/TRAINING PROGRAM
Payer: COMMERCIAL

## 2021-11-09 ENCOUNTER — APPOINTMENT (OUTPATIENT)
Dept: GENERAL RADIOLOGY | Facility: CLINIC | Age: 56
End: 2021-11-09
Attending: PHYSICIAN ASSISTANT
Payer: COMMERCIAL

## 2021-11-09 ENCOUNTER — APPOINTMENT (OUTPATIENT)
Dept: OCCUPATIONAL THERAPY | Facility: CLINIC | Age: 56
End: 2021-11-09
Attending: STUDENT IN AN ORGANIZED HEALTH CARE EDUCATION/TRAINING PROGRAM
Payer: COMMERCIAL

## 2021-11-09 ENCOUNTER — APPOINTMENT (OUTPATIENT)
Dept: GENERAL RADIOLOGY | Facility: CLINIC | Age: 56
End: 2021-11-09
Attending: INTERNAL MEDICINE
Payer: COMMERCIAL

## 2021-11-09 LAB
ABO/RH(D): NORMAL
ALBUMIN SERPL-MCNC: 1.8 G/DL (ref 3.4–5)
ALP SERPL-CCNC: 132 U/L (ref 40–150)
ALT SERPL W P-5'-P-CCNC: 60 U/L (ref 0–70)
ANION GAP SERPL CALCULATED.3IONS-SCNC: 2 MMOL/L (ref 3–14)
ANION GAP SERPL CALCULATED.3IONS-SCNC: 2 MMOL/L (ref 3–14)
ANION GAP SERPL CALCULATED.3IONS-SCNC: 6 MMOL/L (ref 3–14)
ANTIBODY SCREEN: NEGATIVE
AST SERPL W P-5'-P-CCNC: 17 U/L (ref 0–45)
ATRIAL RATE - MUSE: 77 BPM
BILIRUB SERPL-MCNC: 0.5 MG/DL (ref 0.2–1.3)
BUN SERPL-MCNC: 25 MG/DL (ref 7–30)
CALCIUM SERPL-MCNC: 8.6 MG/DL (ref 8.5–10.1)
CALCIUM SERPL-MCNC: 8.6 MG/DL (ref 8.5–10.1)
CALCIUM SERPL-MCNC: 8.7 MG/DL (ref 8.5–10.1)
CHLORIDE BLD-SCNC: 110 MMOL/L (ref 94–109)
CHLORIDE BLD-SCNC: 112 MMOL/L (ref 94–109)
CHLORIDE BLD-SCNC: 112 MMOL/L (ref 94–109)
CO2 SERPL-SCNC: 28 MMOL/L (ref 20–32)
CO2 SERPL-SCNC: 29 MMOL/L (ref 20–32)
CO2 SERPL-SCNC: 29 MMOL/L (ref 20–32)
CREAT SERPL-MCNC: 0.58 MG/DL (ref 0.66–1.25)
CREAT SERPL-MCNC: 0.58 MG/DL (ref 0.66–1.25)
CREAT SERPL-MCNC: 0.65 MG/DL (ref 0.66–1.25)
DIASTOLIC BLOOD PRESSURE - MUSE: NORMAL MMHG
ERYTHROCYTE [DISTWIDTH] IN BLOOD BY AUTOMATED COUNT: 18.2 % (ref 10–15)
GFR SERPL CREATININE-BSD FRML MDRD: >90 ML/MIN/1.73M2
GLUCOSE BLD-MCNC: 125 MG/DL (ref 70–99)
GLUCOSE BLD-MCNC: 125 MG/DL (ref 70–99)
GLUCOSE BLD-MCNC: 128 MG/DL (ref 70–99)
GLUCOSE BLDC GLUCOMTR-MCNC: 103 MG/DL (ref 70–99)
GLUCOSE BLDC GLUCOMTR-MCNC: 107 MG/DL (ref 70–99)
GLUCOSE BLDC GLUCOMTR-MCNC: 116 MG/DL (ref 70–99)
GLUCOSE BLDC GLUCOMTR-MCNC: 120 MG/DL (ref 70–99)
GLUCOSE BLDC GLUCOMTR-MCNC: 126 MG/DL (ref 70–99)
GLUCOSE BLDC GLUCOMTR-MCNC: 127 MG/DL (ref 70–99)
GLUCOSE BLDC GLUCOMTR-MCNC: 127 MG/DL (ref 70–99)
GLUCOSE BLDC GLUCOMTR-MCNC: 128 MG/DL (ref 70–99)
GLUCOSE BLDC GLUCOMTR-MCNC: 133 MG/DL (ref 70–99)
GLUCOSE BLDC GLUCOMTR-MCNC: 146 MG/DL (ref 70–99)
GLUCOSE BLDC GLUCOMTR-MCNC: 148 MG/DL (ref 70–99)
GLUCOSE BLDC GLUCOMTR-MCNC: 150 MG/DL (ref 70–99)
GLUCOSE BLDC GLUCOMTR-MCNC: 157 MG/DL (ref 70–99)
HCT VFR BLD AUTO: 30 % (ref 40–53)
HGB BLD-MCNC: 9.3 G/DL (ref 13.3–17.7)
INR PPP: 1.16 (ref 0.85–1.15)
INTERPRETATION ECG - MUSE: NORMAL
MAGNESIUM SERPL-MCNC: 1.8 MG/DL (ref 1.6–2.3)
MAGNESIUM SERPL-MCNC: 2.1 MG/DL (ref 1.6–2.3)
MCH RBC QN AUTO: 30.2 PG (ref 26.5–33)
MCHC RBC AUTO-ENTMCNC: 31 G/DL (ref 31.5–36.5)
MCV RBC AUTO: 97 FL (ref 78–100)
P AXIS - MUSE: 29 DEGREES
PHOSPHATE SERPL-MCNC: 3.6 MG/DL (ref 2.5–4.5)
PLATELET # BLD AUTO: 200 10E3/UL (ref 150–450)
POTASSIUM BLD-SCNC: 4.3 MMOL/L (ref 3.4–5.3)
POTASSIUM BLD-SCNC: 4.3 MMOL/L (ref 3.4–5.3)
POTASSIUM BLD-SCNC: 4.4 MMOL/L (ref 3.4–5.3)
PR INTERVAL - MUSE: 156 MS
PROT SERPL-MCNC: 5 G/DL (ref 6.8–8.8)
QRS DURATION - MUSE: 74 MS
QT - MUSE: 392 MS
QTC - MUSE: 443 MS
R AXIS - MUSE: 29 DEGREES
RBC # BLD AUTO: 3.08 10E6/UL (ref 4.4–5.9)
SARS-COV-2 RNA RESP QL NAA+PROBE: NEGATIVE
SODIUM SERPL-SCNC: 143 MMOL/L (ref 133–144)
SODIUM SERPL-SCNC: 143 MMOL/L (ref 133–144)
SODIUM SERPL-SCNC: 144 MMOL/L (ref 133–144)
SPECIMEN EXPIRATION DATE: NORMAL
SYSTOLIC BLOOD PRESSURE - MUSE: NORMAL MMHG
T AXIS - MUSE: 201 DEGREES
UFH PPP CHRO-ACNC: 0.28 IU/ML
UPPER GI ENDOSCOPY: NORMAL
VENTRICULAR RATE- MUSE: 77 BPM
WBC # BLD AUTO: 14.4 10E3/UL (ref 4–11)

## 2021-11-09 PROCEDURE — 250N000011 HC RX IP 250 OP 636: Performed by: THORACIC SURGERY (CARDIOTHORACIC VASCULAR SURGERY)

## 2021-11-09 PROCEDURE — 86900 BLOOD TYPING SEROLOGIC ABO: CPT | Performed by: THORACIC SURGERY (CARDIOTHORACIC VASCULAR SURGERY)

## 2021-11-09 PROCEDURE — U0005 INFEC AGEN DETEC AMPLI PROBE: HCPCS | Performed by: PHYSICIAN ASSISTANT

## 2021-11-09 PROCEDURE — 94667 MNPJ CHEST WALL 1ST: CPT

## 2021-11-09 PROCEDURE — 85520 HEPARIN ASSAY: CPT | Performed by: THORACIC SURGERY (CARDIOTHORACIC VASCULAR SURGERY)

## 2021-11-09 PROCEDURE — 93005 ELECTROCARDIOGRAM TRACING: CPT

## 2021-11-09 PROCEDURE — 74018 RADEX ABDOMEN 1 VIEW: CPT | Mod: 26 | Performed by: RADIOLOGY

## 2021-11-09 PROCEDURE — 250N000011 HC RX IP 250 OP 636: Performed by: INTERNAL MEDICINE

## 2021-11-09 PROCEDURE — 250N000011 HC RX IP 250 OP 636

## 2021-11-09 PROCEDURE — 97535 SELF CARE MNGMENT TRAINING: CPT | Mod: GO | Performed by: OCCUPATIONAL THERAPIST

## 2021-11-09 PROCEDURE — 250N000012 HC RX MED GY IP 250 OP 636 PS 637: Performed by: NURSE PRACTITIONER

## 2021-11-09 PROCEDURE — 250N000013 HC RX MED GY IP 250 OP 250 PS 637: Performed by: STUDENT IN AN ORGANIZED HEALTH CARE EDUCATION/TRAINING PROGRAM

## 2021-11-09 PROCEDURE — 999N000157 HC STATISTIC RCP TIME EA 10 MIN

## 2021-11-09 PROCEDURE — 80048 BASIC METABOLIC PNL TOTAL CA: CPT

## 2021-11-09 PROCEDURE — 94640 AIRWAY INHALATION TREATMENT: CPT

## 2021-11-09 PROCEDURE — 71045 X-RAY EXAM CHEST 1 VIEW: CPT

## 2021-11-09 PROCEDURE — 94640 AIRWAY INHALATION TREATMENT: CPT | Mod: 76

## 2021-11-09 PROCEDURE — 93010 ELECTROCARDIOGRAM REPORT: CPT | Performed by: INTERNAL MEDICINE

## 2021-11-09 PROCEDURE — 71045 X-RAY EXAM CHEST 1 VIEW: CPT | Mod: 26 | Performed by: RADIOLOGY

## 2021-11-09 PROCEDURE — 250N000013 HC RX MED GY IP 250 OP 250 PS 637: Performed by: NURSE PRACTITIONER

## 2021-11-09 PROCEDURE — 0DS68ZZ REPOSITION STOMACH, VIA NATURAL OR ARTIFICIAL OPENING ENDOSCOPIC: ICD-10-PCS | Performed by: INTERNAL MEDICINE

## 2021-11-09 PROCEDURE — 0DHA8UZ INSERTION OF FEEDING DEVICE INTO JEJUNUM, VIA NATURAL OR ARTIFICIAL OPENING ENDOSCOPIC: ICD-10-PCS | Performed by: INTERNAL MEDICINE

## 2021-11-09 PROCEDURE — 85027 COMPLETE CBC AUTOMATED: CPT

## 2021-11-09 PROCEDURE — 250N000013 HC RX MED GY IP 250 OP 250 PS 637: Performed by: INTERNAL MEDICINE

## 2021-11-09 PROCEDURE — 250N000013 HC RX MED GY IP 250 OP 250 PS 637

## 2021-11-09 PROCEDURE — 83735 ASSAY OF MAGNESIUM: CPT | Performed by: PHYSICIAN ASSISTANT

## 2021-11-09 PROCEDURE — 94003 VENT MGMT INPAT SUBQ DAY: CPT

## 2021-11-09 PROCEDURE — 94668 MNPJ CHEST WALL SBSQ: CPT

## 2021-11-09 PROCEDURE — 250N000009 HC RX 250: Performed by: STUDENT IN AN ORGANIZED HEALTH CARE EDUCATION/TRAINING PROGRAM

## 2021-11-09 PROCEDURE — 250N000012 HC RX MED GY IP 250 OP 636 PS 637: Performed by: PHYSICIAN ASSISTANT

## 2021-11-09 PROCEDURE — 250N000012 HC RX MED GY IP 250 OP 636 PS 637: Performed by: INTERNAL MEDICINE

## 2021-11-09 PROCEDURE — 250N000011 HC RX IP 250 OP 636: Performed by: STUDENT IN AN ORGANIZED HEALTH CARE EDUCATION/TRAINING PROGRAM

## 2021-11-09 PROCEDURE — 99207 PR NON-BILLABLE SERV PER CHARTING: CPT | Mod: 24 | Performed by: PHYSICIAN ASSISTANT

## 2021-11-09 PROCEDURE — 49451 REPLACE DUOD/JEJ TUBE PERC: CPT | Performed by: INTERNAL MEDICINE

## 2021-11-09 PROCEDURE — 84100 ASSAY OF PHOSPHORUS: CPT | Performed by: THORACIC SURGERY (CARDIOTHORACIC VASCULAR SURGERY)

## 2021-11-09 PROCEDURE — 250N000013 HC RX MED GY IP 250 OP 250 PS 637: Performed by: ANESTHESIOLOGY

## 2021-11-09 PROCEDURE — 97110 THERAPEUTIC EXERCISES: CPT | Mod: GO | Performed by: OCCUPATIONAL THERAPIST

## 2021-11-09 PROCEDURE — 0DP68UZ REMOVAL OF FEEDING DEVICE FROM STOMACH, VIA NATURAL OR ARTIFICIAL OPENING ENDOSCOPIC: ICD-10-PCS | Performed by: INTERNAL MEDICINE

## 2021-11-09 PROCEDURE — 99233 SBSQ HOSP IP/OBS HIGH 50: CPT | Mod: 24 | Performed by: INTERNAL MEDICINE

## 2021-11-09 PROCEDURE — 200N000002 HC R&B ICU UMMC

## 2021-11-09 PROCEDURE — 76000 FLUOROSCOPY <1 HR PHYS/QHP: CPT | Mod: TC

## 2021-11-09 PROCEDURE — 74018 RADEX ABDOMEN 1 VIEW: CPT

## 2021-11-09 PROCEDURE — 85610 PROTHROMBIN TIME: CPT | Performed by: STUDENT IN AN ORGANIZED HEALTH CARE EDUCATION/TRAINING PROGRAM

## 2021-11-09 RX ORDER — SIMETHICONE 40MG/0.6ML
SUSPENSION, DROPS(FINAL DOSAGE FORM)(ML) ORAL PRN
Status: COMPLETED | OUTPATIENT
Start: 2021-11-09 | End: 2021-11-09

## 2021-11-09 RX ORDER — NALOXONE HYDROCHLORIDE 0.4 MG/ML
0.4 INJECTION, SOLUTION INTRAMUSCULAR; INTRAVENOUS; SUBCUTANEOUS
Status: DISCONTINUED | OUTPATIENT
Start: 2021-11-09 | End: 2021-11-09

## 2021-11-09 RX ORDER — FENTANYL CITRATE 50 UG/ML
50 INJECTION, SOLUTION INTRAMUSCULAR; INTRAVENOUS
Status: COMPLETED | OUTPATIENT
Start: 2021-11-09 | End: 2021-11-09

## 2021-11-09 RX ORDER — NALOXONE HYDROCHLORIDE 0.4 MG/ML
0.2 INJECTION, SOLUTION INTRAMUSCULAR; INTRAVENOUS; SUBCUTANEOUS
Status: DISCONTINUED | OUTPATIENT
Start: 2021-11-09 | End: 2021-11-09

## 2021-11-09 RX ORDER — FENTANYL CITRATE 50 UG/ML
INJECTION, SOLUTION INTRAMUSCULAR; INTRAVENOUS
Status: COMPLETED
Start: 2021-11-09 | End: 2021-11-09

## 2021-11-09 RX ORDER — MAGNESIUM SULFATE HEPTAHYDRATE 40 MG/ML
2 INJECTION, SOLUTION INTRAVENOUS ONCE
Status: COMPLETED | OUTPATIENT
Start: 2021-11-09 | End: 2021-11-09

## 2021-11-09 RX ORDER — FLUMAZENIL 0.1 MG/ML
0.2 INJECTION, SOLUTION INTRAVENOUS
Status: DISCONTINUED | OUTPATIENT
Start: 2021-11-09 | End: 2021-11-10

## 2021-11-09 RX ORDER — ESCITALOPRAM OXALATE 5 MG/1
5 TABLET ORAL DAILY
Status: DISCONTINUED | OUTPATIENT
Start: 2021-11-09 | End: 2021-12-05

## 2021-11-09 RX ORDER — METOPROLOL TARTRATE 25 MG/1
75 TABLET, FILM COATED ORAL 2 TIMES DAILY
Status: DISCONTINUED | OUTPATIENT
Start: 2021-11-09 | End: 2021-11-17

## 2021-11-09 RX ORDER — HYDRALAZINE HYDROCHLORIDE 20 MG/ML
20 INJECTION INTRAMUSCULAR; INTRAVENOUS EVERY 6 HOURS PRN
Status: DISCONTINUED | OUTPATIENT
Start: 2021-11-09 | End: 2021-12-13 | Stop reason: HOSPADM

## 2021-11-09 RX ORDER — PHENYLEPHRINE HCL IN 0.9% NACL 50MG/250ML
.5-1.25 PLASTIC BAG, INJECTION (ML) INTRAVENOUS CONTINUOUS
Status: DISCONTINUED | OUTPATIENT
Start: 2021-11-09 | End: 2021-11-09

## 2021-11-09 RX ORDER — FENTANYL CITRATE 50 UG/ML
25 INJECTION, SOLUTION INTRAMUSCULAR; INTRAVENOUS EVERY 5 MIN PRN
Status: DISCONTINUED | OUTPATIENT
Start: 2021-11-09 | End: 2021-11-10

## 2021-11-09 RX ORDER — AMINO AC/PROTEIN HYDR/WHEY PRO 10G-100/30
1 LIQUID (ML) ORAL 3 TIMES DAILY
Status: DISCONTINUED | OUTPATIENT
Start: 2021-11-09 | End: 2021-11-22

## 2021-11-09 RX ORDER — FUROSEMIDE 10 MG/ML
20 INJECTION INTRAMUSCULAR; INTRAVENOUS ONCE
Status: COMPLETED | OUTPATIENT
Start: 2021-11-09 | End: 2021-11-09

## 2021-11-09 RX ADMIN — MYCOPHENOLATE MOFETIL 1000 MG: 200 POWDER, FOR SUSPENSION ORAL at 07:58

## 2021-11-09 RX ADMIN — CALCIUM CARBONATE 600 MG (1,500 MG)-VITAMIN D3 400 UNIT TABLET 1 TABLET: at 08:10

## 2021-11-09 RX ADMIN — LEVOTHYROXINE SODIUM 25 MCG: 0.03 TABLET ORAL at 08:10

## 2021-11-09 RX ADMIN — NYSTATIN 1000000 UNITS: 500000 SUSPENSION ORAL at 11:43

## 2021-11-09 RX ADMIN — Medication 40 MG: at 07:59

## 2021-11-09 RX ADMIN — Medication 1 PACKET: at 20:41

## 2021-11-09 RX ADMIN — MULTIVIT AND MINERALS-FERROUS GLUCONATE 9 MG IRON/15 ML ORAL LIQUID 15 ML: at 08:11

## 2021-11-09 RX ADMIN — LEVALBUTEROL HYDROCHLORIDE 1.25 MG: 1.25 SOLUTION RESPIRATORY (INHALATION) at 15:18

## 2021-11-09 RX ADMIN — AMIODARONE HYDROCHLORIDE 200 MG: 200 TABLET ORAL at 08:10

## 2021-11-09 RX ADMIN — ROSUVASTATIN CALCIUM 10 MG: 10 TABLET, FILM COATED ORAL at 08:01

## 2021-11-09 RX ADMIN — NYSTATIN 1000000 UNITS: 500000 SUSPENSION ORAL at 15:26

## 2021-11-09 RX ADMIN — FUROSEMIDE 20 MG: 10 INJECTION, SOLUTION INTRAVENOUS at 09:11

## 2021-11-09 RX ADMIN — PREDNISOLONE 12.5 MG: 15 SOLUTION ORAL at 07:57

## 2021-11-09 RX ADMIN — LABETALOL HYDROCHLORIDE 20 MG: 5 INJECTION, SOLUTION INTRAVENOUS at 01:41

## 2021-11-09 RX ADMIN — Medication 40 MG: at 20:40

## 2021-11-09 RX ADMIN — SULFAMETHOXAZOLE AND TRIMETHOPRIM 80 MG: 200; 40 SUSPENSION ORAL at 07:58

## 2021-11-09 RX ADMIN — CALCIUM CARBONATE 600 MG (1,500 MG)-VITAMIN D3 400 UNIT TABLET 1 TABLET: at 17:58

## 2021-11-09 RX ADMIN — FENTANYL CITRATE 50 MCG: 50 INJECTION, SOLUTION INTRAMUSCULAR; INTRAVENOUS at 13:52

## 2021-11-09 RX ADMIN — MIDAZOLAM 2 MG: 1 INJECTION INTRAMUSCULAR; INTRAVENOUS at 13:45

## 2021-11-09 RX ADMIN — TACROLIMUS 2 MG: 5 CAPSULE ORAL at 07:59

## 2021-11-09 RX ADMIN — ACETYLCYSTEINE 2 ML: 200 SOLUTION ORAL; RESPIRATORY (INHALATION) at 07:46

## 2021-11-09 RX ADMIN — ESCITALOPRAM 5 MG: 5 TABLET, FILM COATED ORAL at 11:43

## 2021-11-09 RX ADMIN — MIDAZOLAM 1 MG: 1 INJECTION INTRAMUSCULAR; INTRAVENOUS at 13:52

## 2021-11-09 RX ADMIN — LIDOCAINE 2 PATCH: 560 PATCH PERCUTANEOUS; TOPICAL; TRANSDERMAL at 18:50

## 2021-11-09 RX ADMIN — ACETYLCYSTEINE 2 ML: 200 SOLUTION ORAL; RESPIRATORY (INHALATION) at 20:59

## 2021-11-09 RX ADMIN — MYCOPHENOLATE MOFETIL 1000 MG: 200 POWDER, FOR SUSPENSION ORAL at 20:40

## 2021-11-09 RX ADMIN — Medication 10 MG: at 20:40

## 2021-11-09 RX ADMIN — LEVALBUTEROL HYDROCHLORIDE 1.25 MG: 1.25 SOLUTION RESPIRATORY (INHALATION) at 11:41

## 2021-11-09 RX ADMIN — FLUCONAZOLE IN SODIUM CHLORIDE 400 MG: 2 INJECTION, SOLUTION INTRAVENOUS at 11:43

## 2021-11-09 RX ADMIN — LEVALBUTEROL HYDROCHLORIDE 1.25 MG: 1.25 SOLUTION RESPIRATORY (INHALATION) at 07:46

## 2021-11-09 RX ADMIN — METOPROLOL TARTRATE 50 MG: 25 TABLET, FILM COATED ORAL at 08:10

## 2021-11-09 RX ADMIN — ACETAMINOPHEN 975 MG: 325 TABLET, FILM COATED ORAL at 18:51

## 2021-11-09 RX ADMIN — ACETYLCYSTEINE 2 ML: 200 SOLUTION ORAL; RESPIRATORY (INHALATION) at 11:40

## 2021-11-09 RX ADMIN — NYSTATIN 1000000 UNITS: 500000 SUSPENSION ORAL at 08:00

## 2021-11-09 RX ADMIN — SODIUM CHLORIDE, PRESERVATIVE FREE 5 ML: 5 INJECTION INTRAVENOUS at 17:58

## 2021-11-09 RX ADMIN — ACYCLOVIR 400 MG: 200 SUSPENSION ORAL at 07:58

## 2021-11-09 RX ADMIN — FENTANYL CITRATE 50 MCG: 50 INJECTION, SOLUTION INTRAMUSCULAR; INTRAVENOUS at 13:46

## 2021-11-09 RX ADMIN — ACETYLCYSTEINE 2 ML: 200 SOLUTION ORAL; RESPIRATORY (INHALATION) at 15:18

## 2021-11-09 RX ADMIN — LEVALBUTEROL HYDROCHLORIDE 1.25 MG: 1.25 SOLUTION RESPIRATORY (INHALATION) at 20:59

## 2021-11-09 RX ADMIN — TACROLIMUS 2 MG: 5 CAPSULE ORAL at 17:58

## 2021-11-09 RX ADMIN — PREDNISOLONE 12.5 MG: 15 SOLUTION ORAL at 20:40

## 2021-11-09 RX ADMIN — POTASSIUM CHLORIDE 40 MEQ: 40 SOLUTION ORAL at 08:10

## 2021-11-09 RX ADMIN — NYSTATIN 1000000 UNITS: 500000 SUSPENSION ORAL at 20:40

## 2021-11-09 RX ADMIN — METOPROLOL TARTRATE 75 MG: 25 TABLET, FILM COATED ORAL at 20:40

## 2021-11-09 RX ADMIN — SIMETHICONE 133 MG: 20 SUSPENSION/ DROPS ORAL at 13:00

## 2021-11-09 RX ADMIN — MAGNESIUM SULFATE HEPTAHYDRATE 2 G: 40 INJECTION, SOLUTION INTRAVENOUS at 06:42

## 2021-11-09 RX ADMIN — ACYCLOVIR 400 MG: 200 SUSPENSION ORAL at 20:40

## 2021-11-09 ASSESSMENT — ACTIVITIES OF DAILY LIVING (ADL)
ADLS_ACUITY_SCORE: 25

## 2021-11-09 ASSESSMENT — MIFFLIN-ST. JEOR: SCORE: 1441

## 2021-11-09 NOTE — PROGRESS NOTES
Major Shift Events:  A/O x2 responds appropriately to name and place, unsure on date, follows commands able to make needs known.  Pt moves right arm, right leg and left leg spontaneously, is able to squeeze left hand very weakly.  SR, BP WNL.  Pt did a pressure support trial 7/5 x 1 hour and than tolerated trach dome 40/45% for 2 hours in the am and 1 hr in the afternoon.  Lung sounds clear, diminished lower lobes.  Pt coughs frequently but very minimal secretions with suctioning.  PEG tube replaced today in the room with IR, moderate sedation given, pt tolerated well.  3 loose BM this shift, adequate urine output, lasix given x1.  CT with minimal output.  Pt denies pain.  OT worked with pt in bed, PT wanted to do exercises this afternoon but pt having IR procedure and then sleeping following moderate sedation.  Care conference today to discuss possible placement options, no report given back to this RN regarding any new information from that.   Plan: Continue to work with PT/OT and trach dome as tolerated.   For vital signs and complete assessments, please see documentation flowsheets.

## 2021-11-09 NOTE — PROVIDER NOTIFICATION
UO 15ml/hr x2 hours  CVTS moonlighter notified  Continue to monitor another couple of hours, no interventions at this time

## 2021-11-09 NOTE — PLAN OF CARE
Major Shift Events:  A&Ox4, afebrile, no c/o pain. LUE>RUE movement, no change, LEs w/little movement. SR 80-90s, no ectopy. BP 120s-160s/80-90s. CT x1 w/minimal output. Trach Shiley 6, CMV settings: FiO2 40%, , RR 12, PEEP 5. PS 7/5 x2 today, increased tachypnea and tachycardia w/second PS. Minimal secretions. TFs decreased to 20ml/hr via J d/t probably PEG malpositioning and TF draining into G, consult placed for PEG replacement. G to gravity w/TFs/bile output. Insulin gtt 1-2units/hr, BGs 90-160s. Metzger w/adequate UOP. Scattered scabbing bruising, incision and CT site intact. Care conference tomorrow at 1430.      Plan: Increase PS trials w/goal of trach dome. Possibly pull metzger tomorrow pending diuresis plan.     GTTs:   Heparin 800units/hr  Insulin 2units/hr  TKO 5ml/hr    For vital signs and complete assessments, please see documentation flowsheets.     Paramjit Richards RN  Hours cared for: [id number]

## 2021-11-09 NOTE — PROGRESS NOTES
Pulmonary Medicine  Cystic Fibrosis - Lung Transplant Team  Progress Note  2021       Patient: Edson Thornton  MRN: [id number]  : 1965 (age 56 year old)  Transplant: 10/16/2021 (Lung), POD#24  Admission date: 2021    Assessment & Plan:     Edson Thornton is a 56 year old male with a PMH significant for NSIP/ILD, bronchiectasis, moderate PH, RA, SALTY, chronic HSV infection, hypogammaglobulinemia, steroid-induced diabetes, hypothyroidism, PFO, HTN, HLD, duodenal anomaly, anxiety, and depression.  Admitted on 21 from OSH for acute on chronic respiratory failure 2/2 ILD exacerbation, now s/p BSLT on 10/16/21.  Prolonged vent wean s/p trach and PEG/J tube placement with thoracic surgery 10/29.  Post-op course otherwise complicated by encephalopathy and diffuse weakness, acute to subacute CVA, afib with RVR, BRENNAN, GI bleed, Candidemia/Candida empyema, and recurrent fevers.  Code called  for bradycardia/asystole and progressively hypotensive, found to have acute drop in hemoglobin and bloody G tube output.  Mentation and weakness slowly improving..     Today's recommendations:   - PS and TD trials BID as tolerated  - Diuresis per ICU team  - CXR daily with left chest tube in place  - Tentative plan for inspection bronch  at 1300  - Our team will plan to attend care conference today at 1430 for family update  - Tacrolimus level ordered   - Prednisolone taper due  (not yet ordered)  - CMV and EBV ordered   - Continue acyclovir through through 11/15  - Coccidioides Ag ordered   - Continue fluconazole through , EKG for QTc monitoring (ordered)  - IgG ordered   - DSA ordered 11/15  - PHS high risk donor risk labs ordered 11/15  - Awaiting IR PEG/J tube revision, continue TF at trophic rate until repositioned     S/p BSLT for ILD:  Acute hypoxic respiratory failure s/p trach:   Pulmonary edema:  Bilateral pleural effusions: Unfortunately had not received  vaccination for flu, PNA, or COVID-19 PTA.  Explant pathology with NSIP, no malignancy.  PGD 2-3.  Weaned off paralytic 10/19 (for vent dyssynchrony) and Caroline 10/22.  Initial difficulty weaning sedation given agitation then with neurological findings as below.  CXR initially post-op with pulmonary edema, aggressively diuresed.  Recurring fevers post-op, see ID section below.  Prolonged vent wean s/p trach and PEG/J tube placement with thoracic surgery 10/29.  Most recent bronch 10/30 with thick secretions in the RUL and LLL and mild mucosal erythema, bilateral anastomoses intact.  Code called 11/2 for bradycardia/asystole (required 1 round of CPR, no medications) then progressively hypotensive, GI bleed as below.  Chest CT 11/2 with increased bilateral pleural effusions (moderate left, small right), bibasilar atelectasis with area of hypoenhancing parenchyma in LLL (suspicious for infection), and numerous nondisplaced anterior rib fractures bilaterally.  S/p left chest tube placement in IR 11/3, right deferred given very little effusion on US.  Intermittently tolerating PS trials.  - Nebs: levalbuterol and Mucomyst QID  - Aggressive pulmonary toilet with chest physiotherapy QID  - Ventilator management per ICU team, PS and TD trials BID as tolerated  - Diuresis per ICU team  - CXR daily with left chest tube in place, today with stable small left and trace right pleural effusions and ongoing mixed perihilar and bibasilar opacities (personally reviewed with Dr. Mckeon)  - Tentative plan for inspection bronch 11/12 at 1300  - Our team will plan to attend care conference 11/9 at 1430 for family update     Immunosuppression: s/p induction therapy with basiliximab 10/16 (and high dose IV steroid) and 10/20  - Tacrolimus 2 mg BID (increased 11/8, via G tube).  Goal level 8-12.  Repeat level 11/11 (ordered).  - MMF 1000 mg BID (11/2, decreased given GI bleed, AZA to be avoided given TPMT)  - Prednisolone 12.5 mg BID, next  taper due 11/21 (not yet ordered)  Date AM dose (mg) PM dose (mg)   11/7/21 12.5 12.5   11/21/21 12.5 10   12/5/21 10 10   1/2/22 10 7.5   1/30/22 7.5 7.5   2/27/22 7.5 5   3/27/22 5 5   4/24/22 5 2.5      Prophylaxis:   - Bactrim for PJP ppx (held 11/2-11/5 d/t BRENNAN)  - Nystatin for oral candidiasis ppx, 6 month course  - See below for serologies and viral ppx:    Donor Recipient Intervention   CMV status Negative Negative None, CMV monthly (ordered 11/16)   EBV status Positive Positive None, EBV monthly (ordered 11/16)   HSV status N/A Positive Acyclovir POD #1-30 through 11/15 (ordered)  (recent infection history pre-txp)      ID: Concern for possible Strongyloides exposure pre-transplant s/p ivermectin x1 dose (9/17).  Donor and recipient cultures NGTD.  S/p IV ceftazidime/vancomycin for 48h per protocol and additional empiric ceftazidime 10/19-10/23 given recurrent fevers.  Bronch cultures 10/17 with Staph epi.  Cryptococcal Ag negative 10/23.    - Monthly Coccidioides Ag x12 months post-transplant per ID (urine and serum negative 10/17), due 11/17 (ordered)     Recurring fevers, Resolved:  Fevers post-op, Tmax 101.7 POD #1.  Febrile with worsening leukocytosis again 10/25, generally persisting.  Trach sputum culture (10/25) with GPC, normal jean marie.  LP (10/29), xanthochromic with pleocytosis thought to be appropriate given RBC and WBCs, no ABX recommended per transplant ID and neurology.  Bronch culture (10/30) with GPC in clusters, normal jean marie.  S/p empiric meropenem 10/28-11/2.  Trach sputum culture (11/2) with Staph epi, treatment not indicated per transplant ID.  Now afebrile.     Disseminated Candida: Noted on blood cultures 10/20 and 10/22.  BDG fungitell positive (399) on 10/20.  Respiratory cultures with persistent Candida albicans.  TC 10/23 without evidence of endocarditis.  Ophthalmology consult 10/24 with benign dilated fundoscopic exam.  Candida empyema also noted 10/25, chest tubes inadvertently  removed by CVTS 10/28, left chest tube replaced by IR 11/3 as above.  Most recent blood culture 11/2 negative.  - Pleural cultures (11/3) with Candida albicans  - Fluconazole (10/26-11/25 per transplant ID, prior micafungin 10/22-10/27), repeat EKG for QTc monitoring 11/9 (ordered), LFTs normal 11/9 (see transplant ID note 11/5, will need repeat imaging to ensure left pleural effusion almost entirely resolved before removing chest tube and concluding fluconazole)      HSV: Chronic intermittent active infection pre-transplant with recent HSV infection: crusted lesions throughout left side of jaw, s/p 10 day treatment course of ACV through 10/9.  HSV PCR blood negative 10/17.  - ACV ppx as above (started POD #1 instead of POD #8 given HSV history and location)     Hypogammaglobulinemia: IgG previously low at 364 (9/7).  Noted at 265 at time of transplant, s/p IVIG with premedication 10/21.    - Repeat IgG 11/17 (ordered)     Positive cross match: Note that he received two doses of rituximab in June, which is likely contributing to cross match result.  DSA most recently negative 11/1.  - DSA monitoring q2 weeks (11/15, ordered)     PHS risk criteria donor:  Additional labs required post-transplant (between 4-8 weeks post-op): Hepatitis B, Hepatitis C, and HIV by VISHAL (ordered 11/15).     SALTY: Noted pre-transplant.  Home CPAP 6-12 cm H2O.  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  MRI 10/23 with multifocal subacute infarcts within both cerebral hemispheres and left cerebellum.  DDx include surgery v embolic v infectious.  Heparin drip started 10/23 (intermittently held with GI bleed as below).  Repeat stroke  code 10/25 d/t marked decrease in responsiveness with sedation wean, pupil inequality, and absent gag reflex.  CTA head without obvious new pathology, MRI brain (with and without contrast) primarily revealing for infarct, low likelihood of PRES.  Ammonia normal.  VEEG per neuro with severe diffuse encephalopathy.  Gradual improvement noted since 10/29, repeat head CT 11/2 (following code) without new acute intracranial abnormalities.  - AC management per primary team     Afib with RVR: Noted 10/18, started on amiodarone drip and transitioned to PO 10/21, dose decreased 10/29 (anticipate 4 week course).  Currently in SR.  Heparin drip as above.  Metoprolol resumed 11/3.     Right subclavian DVT: Seen on UE US from 11/4.  Heparin drip resumed 11/5.        Recurrent GI bleed: Hemoglobin dropped to 6.6 10/22, s/p 2 units pRBC.  OG tube with bloody output, EGD 10/23 noted NJ/OG tube trauma with scant oozing.  Progressive hypotension 11/2, hemoglobin 5.8 with bloody G tube output.  Heparin drip held, transitioned to PPI drip, and MTP activated.  EGD 11/2 with large amount of clotted blood in stomach and area of raised mucosa with small adherent clot near PEG tube site that was clipped, no active bleeding.  Abdominal CT 11/2 with stomach distended with blood products and dilated small bowel.  Maroon stools 11/3, repeat EGD with extensive old blood in stomach, no active bleeding, small nodular area with prior clips clipped again.  Most recent EGD 11/5 with ulcer noted at PEG tube bumper site, gastritis, and suction marks from G tube, and GJ arm is in the duodenal bulb.  Large amount of TF noted in G tube drainage 11/7.  AXR 11/9 with GJ tube tip projecting over proximal duodenum.    - Awaiting IR PEG/J tube revision, continue TF at trophic rate until repositioned     BRENNAN, Improving:   Hyperkalemia: Baseline creatinine 0.7.  BRENNAN noted POD #1, UO also downtrending.  Improved with aggressive diuresis, Cr worsened again (1.38  "on 11/2) along with up trending BUN and hyperkalemia.  Nephrology consulted 11/1, thought to be prerenal/component of ATN.  Now improving.     Elevated LFTs, Improving: Shock liver post-op.  Initial ALT//230 evening of surgery, then with marked increase to 1550/1246 POD #1.  Had improved although now with acute rise again likely in setting of shock, now improved.    We appreciate the excellent care provided by the CVICU and CVTS teams.  Recommendations communicated via in person rounding and this note.  Will continue to follow along closely, please do not hesitate to call with any questions or concerns.    Patient seen and discussed with Dr. Mckeon.    Keyla Rice PA-C  Inpatient PRINCESS  Pulmonary CF/Transplant     Subjective & Interval History:     Alert during visit, moving left > right extremities.  PS 7/5 with FiO2 40% twice yesterday, second limited by tachypnea and tachycardia, tolerating this morning.  Intermittent dyspnea.  Received chest physiotherapy TID yesterday.  Left chest tube remains.  TF at 20 ml/hr while awaiting GJ tube reposition.  Having dark stools although note blood noted per nursing.  Net even with diuresis yesterday.    Review of Systems:     C: No fever, + slight decrease in weight  INTEGUMENTARY/SKIN: No rash or obvious new lesions  ENT/MOUTH: No nasal drainage  RESP: See interval history  CV: No chest pain, no peripheral edema  GI: No nausea, no vomiting  : + decrease in urine output  MUSCULOSKELETAL: No myalgias, no arthralgias  ENDOCRINE: Blood sugars with adequate control  NEURO: No headache  PSYCHIATRIC: Calm    Physical Exam:     Vital signs:  Temp: 97.7  F (36.5  C) Temp src: Axillary BP: 136/89 Pulse: 78   Resp: 22 SpO2: 99 % O2 Device: Mechanical Ventilator Oxygen Delivery: 15 LPM Height: 162.6 cm (5' 4\") Weight: 70 kg (154 lb 5.2 oz)  I/O:     Intake/Output Summary (Last 24 hours) at 11/9/2021 0743  Last data filed at 11/9/2021 0700  Gross per 24 hour   Intake 1519.19 ml "   Output 1575 ml   Net -55.81 ml     Constitutional: Sitting up in bed, in no apparent distress.   HEENT: Eyes with pink conjunctivae, anicteric.  Oral mucosa moist without lesions.  Trach cdi.  PULM: Mildly diminished air flow to bases bilaterally.  No crackles, no rhonchi, no wheezes.  Non-labored breathing on PS 7/5 with FiO2 40%.  CV: Normal S1 and S2.  RRR.  No murmur, gallop, or rub.  No peripheral edema.   ABD: NABS, soft, nontender, nondistended.    MSK: Able to squeeze left > right UE and wiggle left > right toes.  No apparent muscle wasting.   NEURO: Alert, following commands, able to answer yes/no questions by nodding/shaking head.  SKIN: Warm, dry.  No rash on limited exam.   PSYCH: Calm.     Lines, Drains, and Devices:  Peripheral IV 11/02/21 Anterior;Distal;Right Lower forearm (Active)   Site Assessment [facility] 11/09/21 0400   Line Status Infusing 11/09/21 0400   Dressing Intervention Dressing reinforced 11/08/21 2000   Phlebitis Scale 0-->no symptoms 11/09/21 0400   Infiltration Scale 0 11/09/21 0400   Infiltration Site Treatment Method  None 11/05/21 0400   Number of days: 7       Peripheral IV 11/02/21 Anterior;Right Upper forearm (Active)   Site Assessment [facility] 11/09/21 0400   Line Status Infusing 11/09/21 0400   Dressing Intervention Dressing reinforced 11/08/21 2000   Phlebitis Scale 0-->no symptoms 11/09/21 0400   Infiltration Scale 0 11/09/21 0400   Infiltration Site Treatment Method  None 11/09/21 0400   Number of days: 7       PICC Triple Lumen 11/04/21 Left Basilic Access. PICC okay to use. (Active)   Site Assessment [facility] 11/09/21 0400   External Cath Length (cm) 1 cm 11/04/21 1747   Extremity Circumference (cm) 28 cm 11/04/21 1747   Dressing Intervention Chlorhexidine patch;Transparent 11/09/21 0400   Dressing Change Due 11/14/21 11/09/21 0400   Mosley - Status saline locked 11/08/21 2000   Mosley - Cap Change Due 11/11/21 11/09/21 0400   Red - Status saline locked 11/09/21 5810   Red - Cap Change  Due 11/11/21 11/09/21 0400   White - Status infusing 11/09/21 0400   White - Cap Change Due 11/11/21 11/09/21 0400   Extravasation? No 11/09/21 0400   Line Necessity Yes, meets criteria 11/09/21 0400   Number of days: 5     Data:     LABS    CMP:   Recent Labs   Lab 11/09/21  0658 11/09/21  0539 11/09/21  0351 11/08/21  1500 11/08/21  1314 11/08/21  0346 11/08/21  0344 11/07/21  1808 11/07/21  1612 11/07/21  0626 11/07/21  0518 11/06/21  0557 11/06/21  0438   NA  --   --  143  143  --  145*  --  146*  146*  --  144  --  146*  146*   < > 144  144   POTASSIUM  --   --  4.3  4.3  --  4.7  --  3.9  3.9  --  4.2  --  3.5  3.5   < > 3.9  3.9   CHLORIDE  --   --  112*  112*  --  115*  --  115*  115*  --  116*  --  114*  114*   < > 114*  114*   CO2  --   --  29  29  --  27  --  28  28  --  25  --  26  26   < > 24  24   ANIONGAP  --   --  2*  2*  --  3  --  3  3  --  3  --  6  6   < > 6  6   * 120* 125*  125*  127*   < > 126*   < > 152*  152*   < > 139*   < > 113*  113*   < > 190*  190*   BUN  --   --  25  25  --  26  --  31*  31*  --  29  --  29  29   < > 36*  36*   CR  --   --  0.58*  0.58*  --  0.54*  --  0.56*  0.56*  --  0.60*  --  0.58*  0.58*   < > 0.62*  0.62*   GFRESTIMATED  --   --  >90  >90  --  >90  --  >90  >90  --  >90  --  >90  >90   < > >90  >90   ERIK  --   --  8.6  8.6  --  8.3*  --  8.6  8.6  --  8.4*  --  8.3*  8.3*   < > 8.4*  8.4*   MAG  --   --  1.8  --  2.2  --  1.8  --   --   --  1.9  --  1.6   PHOS  --   --  3.6  --   --   --  2.8  --   --   --  2.3*  --  2.9   PROTTOTAL  --   --  5.0*  --   --   --  4.6*  --   --   --  4.6*  --  5.1*   ALBUMIN  --   --  1.8*  --   --   --  1.7*  --   --   --  1.8*  --  1.9*   BILITOTAL  --   --  0.5  --   --   --  0.2  --   --   --  0.3  --  0.5   ALKPHOS  --   --  132  --   --   --  134  --   --   --  134  --  149   AST  --   --  17  --   --   --  <3  --   --   --  6  --  42   ALT  --   --  60  --   --   --  78*  --    --   --  107*  --  176*    < > = values in this interval not displayed.     CBC:   Recent Labs   Lab 11/09/21  0351 11/08/21 0344 11/07/21 0518 11/06/21 0438   WBC 14.4* 16.5* 13.4* 19.7*   RBC 3.08* 2.94* 2.89* 3.21*   HGB 9.3* 9.0* 8.8* 9.5*   HCT 30.0* 28.4* 27.8* 30.4*   MCV 97 97 96 95   MCH 30.2 30.6 30.4 29.6   MCHC 31.0* 31.7 31.7 31.3*   RDW 18.2* 18.2* 17.9* 18.0*    182 180 176       INR:   Recent Labs   Lab 11/09/21 0351 11/08/21 0344 11/07/21 0518 11/06/21 0438   INR 1.16* 1.18* 1.18* 1.26*       Glucose:   Recent Labs   Lab 11/09/21  0658 11/09/21  0539 11/09/21 0351 11/09/21  0237 11/09/21  0146   * 120* 125*  125*  127* 146* 150*       Blood Gas:   Recent Labs   Lab 11/07/21  0626 11/06/21  0438 11/05/21  0504   PHV 7.45*  --   --    PCO2V 40  --   --    PO2V 38  --   --    HCO3V 28  --   --    LORI 3.3*  --   --    O2PER 40 40 40       Culture Data No results for input(s): CULT in the last 168 hours.    Virology Data:   Lab Results   Component Value Date    FLUAH1 Negative 10/17/2021    FLUAH3 Negative 10/17/2021    LN8922 Negative 10/17/2021    IFLUB Negative 10/17/2021    RSVA Negative 10/17/2021    RSVB Negative 10/17/2021    PIV1 Negative 10/17/2021    PIV2 Negative 10/17/2021    PIV3 Negative 10/17/2021    HMPV Negative 10/17/2021    HRVS Negative 10/17/2021    ADVBE Negative 10/17/2021    ADVC Negative 10/17/2021       Historical CMV results (last 3 of prior testing):  Lab Results   Component Value Date    CMVQNT Not Detected 10/26/2021     No results found for: CMVLOG    Urine Studies    Recent Labs   Lab Test 11/01/21  1336 10/25/21  1507   URINEPH 5.5 5.5   NITRITE Negative Negative   LEUKEST Negative Negative   WBCU 0 2       Most Recent [facility] Pulmonary Function Testing (FVC/FEV1 only)  No results found for: 20002  No results found for: 20003  No results found for: 20015  No results found for: 20016    IMAGING    Recent Results (from the past 48 hour(s))   XR Chest  Port 1 View    Narrative    EXAMINATION:  XR CHEST PORT 1 VIEW 11/8/2021 12:51 AM.    COMPARISON: 11/7/2021.    HISTORY:  s/p lung transplant    FINDINGS: Surgical changes of bilateral lung transplant with clamshell  median sternotomy wires and overlying skin staples. Left PICC tip  projects over the innominate-SVC confluence, stable. Tracheostomy tube  tip projects over the mid trachea. Left pigtail catheter is stable in  position. Cardiomediastinal silhouette is stable. Pulmonary  vasculature is indistinct. Unchanged small left and trace right  pleural effusions. No significant change in the mixed perihilar and  bibasilar pulmonary opacities. No pneumothorax. Partially visualized  percutaneous GJ tube in the upper abdomen.      Impression    IMPRESSION:   1. Stable postsurgical changes of bilateral lung transplant. No  significant change in the mixed perihilar and bibasilar pulmonary  opacities, likely representing atelectasis.  2. Stable small left and trace right pleural effusions.    I have personally reviewed the examination and initial interpretation  and I agree with the findings.    KAMLA SHIN MD         SYSTEM ID:  [id number]   XR Abdomen Port 1 View    Narrative    EXAMINATION:  XR ABDOMEN PORT 1 VIEWS 11/8/2021 12:53 AM.    COMPARISON: 11/5/2021.    HISTORY:  placement of PEGJ    FINDINGS: Supine view of the abdomen. Percutaneous gastrojejunostomy  tube with tip projecting over the proximal duodenum. Catheter tubing  loops in the fundus. Left basilar chest tube. Nonobstructive bowel gas  pattern. Hemostatic clips projecting over the stomach. Osseous  structures are within normal limits. Please refer to same-day  radiograph of the chest for findings in the lung bases.      Impression    IMPRESSION:   1. Percutaneous gastrojejunostomy tube with distal tip projecting over  the proximal duodenum.  2. Nonobstructive bowel gas pattern.    I have personally reviewed the examination and initial  interpretation  and I agree with the findings.    KAMLA SHIN MD         SYSTEM ID:  [id number]

## 2021-11-09 NOTE — PROGRESS NOTES
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 Recently has been having tube feeds reflux out the gastrostomy suggesting that tip of jejunal limb has coiled into the stomach. AXR today with GJ tip projecting over proximal duodenum.     IR unable to replace given that it has been less than 6 weeks from placement.   [facility] will attempt to replace at bedside today with fluoroscopic guidance - may place additional T-tags.  Tube feeds and heparin gtt held at 1100    Above in collaboration with Dr. Michael Ha PA-C  [facility]  Pager *[phone]

## 2021-11-09 NOTE — PLAN OF CARE
Major Shift Events:   Plan: care conference @ 1434 for Ltach plans and replace ND->NJ today. Switch to sub-q insulin?  For vital signs and complete assessments, please see documentation flowsheets.

## 2021-11-09 NOTE — OR NURSING
Pt had EGD with G/J exchange under moderate sedation. Mod sedation provided by bedside ICU RN. Pt tolerated procedure very well. Procedural report given to bedside RN.

## 2021-11-09 NOTE — PROGRESS NOTES
CLINICAL NUTRITION SERVICES - REASSESSMENT NOTE   Nutrition Prescription    Malnutrition Status:    Severe malnutrition in the context of acute on chronic illness    Recommendations already ordered by Registered Dietitian (RD):  1. Continue EN support (NovaSource Renal):  -->11/9: Once J-tube repositioned and ok to advance beyond 20 mL/hr, advance by 10 ml q2h to eventual goal @ 45 mL/hr     2. Reordered ProSource 1 pkt TID (had been discontinued 11/2)    GOAL EN regimen: Novasource Renal @ 45 ml/hr (1080 ml) + Prosource (1 pkt TID) provides 2280 kcal (36 kcal/kg), 98 g pro (2 g/kg), 198 g CHO, 774 ml free water, and 0+ g fiber daily.    Future/Additional Recommendations:  Monitor ongoing tolerance to EN support, wt trends.      EVALUATION OF THE PROGRESS TOWARD GOALS   Diet: NPO    Enteral Access: PEG/J - GI replaced PEG/J tube (J-tube coiled in stomach tip in duodenal bulb per EGD on 11/5, TF draining into G per RN notes)    FWF: 50 mL q4h    Nutrition Support: EN  10/17-10/18: Osmolite @ 20 mL/hr via OGT   10/18-10/24: Osmolite 1.5 Conner @ goal of  60ml/hr (1440ml/day) + 1 packet ProSource BID (2 pkts total) will provide: 2240 kcals (35 kca/kg), 112 g PRO (1.8 g/kg), 1097 ml free H20, 293 g CHO, and 0 g fiber daily.   10/24-10/25: Osmolite @ 20 mL/hr via OGT   10/26-10/28: Osmolite 1.5 Conner @ goal of  60ml/hr (1440ml/day) + 1 packet ProSource BID   10/28-Current: Novasource Renal @ 45 ml/hr (1080 ml) + Prosource (1 pkt TID) provides 2280 kcal (36 kcal/kg), 98 g pro (2 g/kg), 198 g CHO, 774 ml free water, and 0+ g fiber daily.     Enteral Intake: TF decreased to 20 mL/hr since yesterday d/t J-tube malpositioning. Numerous interruptions in EN infusions noted over past week.   -->7-day average enteral nutrition infusions: 437 mL TF + 0 ProSource protein packets = 874 kcals (14 kcal/kg, or 45% minimum assessed kcal needs) and 40 g protein (0.6 g protein/kg, or 48% minimum assessed protein needs).     NEW FINDINGS      -Wt trends: Will maintain dosing wt of 64 kg (based on lowest wt on 9/13)  11/09/21 0400 70 kg (154 lb 5.2 oz)   11/06/21 0600 71 kg (156 lb 8.4 oz)   11/05/21 0000 71.4 kg (157 lb 6.5 oz)   11/04/21 0400 71.3 kg (157 lb 3 oz)   11/03/21 0400 72.2 kg (159 lb 2.8 oz)   11/02/21 0400 69.4 kg (153 lb)   11/01/21 0200 69.5 kg (153 lb 3.5 oz)   10/31/21 0200 69.7 kg (153 lb 10.6 oz)   10/30/21 0200 71.8 kg (158 lb 4.6 oz)   10/29/21 0000 68.6 kg (151 lb 3.8 oz)   10/28/21 0100 68.6 kg (151 lb 3.8 oz)   10/27/21 0400 71.8 kg (158 lb 4.6 oz)   10/26/21 0230 73.8 kg (162 lb 11.2 oz)   10/25/21 0200 73.4 kg (161 lb 13.1 oz)   10/24/21 0400 72.2 kg (159 lb 2.8 oz)   10/23/21 0600 74.5 kg (164 lb 3.9 oz)   10/22/21 0000 74.8 kg (164 lb 14.5 oz)   10/21/21 0600 73 kg (160 lb 15 oz)   10/20/21 0000 76.8 kg (169 lb 5 oz)   10/19/21 0100 75.4 kg (166 lb 3.6 oz)   10/17/21 0400 73 kg (160 lb 15 oz)   10/16/21 0428 68.9 kg (152 lb)   10/15/21 0000 71.8 kg (158 lb 4.6 oz)   10/14/21 0000 71.4 kg (157 lb 6.5 oz)   10/13/21 0000 71.6 kg (157 lb 13.6 oz)   10/12/21 0000 71.2 kg (156 lb 15.5 oz)   10/11/21 0033 70 kg (154 lb 5.2 oz)   10/10/21 0023 70.3 kg (154 lb 15.7 oz)   10/09/21 0615 70.9 kg (156 lb 4.9 oz)   10/07/21 0623 71.2 kg (156 lb 15.5 oz)   10/06/21 0127 69.1 kg (152 lb 5.4 oz)   10/05/21 0330 69.8 kg (153 lb 14.1 oz)   10/04/21 0300 67.9 kg (149 lb 11.1 oz)   10/03/21 0000 68.4 kg (150 lb 12.7 oz)   10/01/21 0000 67.8 kg (149 lb 7.6 oz)   09/29/21 0437 69.1 kg (152 lb 6.4 oz)   09/28/21 0331 68.9 kg (151 lb 14.4 oz)   09/27/21 0554 68.4 kg (150 lb 12.7 oz)   09/26/21 0359 68.3 kg (150 lb 9.2 oz)   09/25/21 0546 68.1 kg (150 lb 2.1 oz)   09/24/21 0500 68 kg (149 lb 14.6 oz)   09/23/21 0322 67.3 kg (148 lb 5.9 oz)   09/22/21 0000 68.4 kg (150 lb 12.7 oz)   09/21/21 0000 67 kg (147 lb 11.3 oz)   09/20/21 1000 66.9 kg (147 lb 7.8 oz)   09/20/21 0041 65.2 kg (143 lb 11.8 oz)   09/19/21 0000 65.5 kg (144 lb 6.4 oz)    09/17/21 0504 68.3 kg (150 lb 9.2 oz)   09/16/21 0439 68.1 kg (150 lb 2.1 oz)   09/15/21 0650 65 kg (143 lb 4.8 oz)   09/15/21 0639 67.5 kg (148 lb 13 oz)   09/14/21 0554 65 kg (143 lb 4.8 oz)   09/13/21 2116 70.6 kg (155 lb 10.3 oz)   09/13/21 0020 64 kg (141 lb 1.5 oz) - lowest wt this admit   09/12/21 0500 68.6 kg (151 lb 3.8 oz)   09/11/21 0347 68.5 kg (151 lb 0.2 oz)   09/10/21 0400 68.1 kg (150 lb 1.6 oz)   09/08/21 0626 68.7 kg (151 lb 8 oz)   09/07/21 0400 72.6 kg (160 lb)   09/06/21 0340 72.9 kg (160 lb 11.5 oz)   09/05/21 1752 73.3 kg (161 lb 8 oz)     -Labs: Reviewed, notable for: K+ 4.4 (WNL), Cr 0.65 (L, may be indicative of low LBM)    -GI: Rectal tube removed 11/6. Having 2-3 loose BMs daily since. C diff negative on 10/27. GJ bumper ulcer. PEG/J replacement today.     -Renal: BRENNAN    -Respiratory: s/p bilateral lung transplant. Tracheostomy on [facility] dom.    -Skin: No recent WOC notes.     -Meds & Vitamin/Mineral Supplementation: Reviewed, notable for: Lasix, Caltrate BID, liquid MVI/mineral, KCl, Senokot, insulin gtt    MALNUTRITION  % Intake: </= 50% for >/= 5 days (severe) - based on average TF intakes over past 7 days  % Weight Loss: Difficult to assess with fluctuations in fluid status   Subcutaneous Fat Loss: Facial region: Mild, Upper arm: Mild and Lower arm: Mild - per previous RD note as pt busy undergoing PEG/J replacement at time of visit  Muscle Loss: Temporal: Moderate, Upper arm (bicep, tricep): Mild, Lower arm  (forearm): Mild, Dorsal hand: Moderate, Patellar region: Mild and Posterior calf: Mild. Edema obtunding muscle status - per previous RD note as pt busy undergoing PEG/J replacement at time of visit  Fluid Accumulation/Edema: Does not meet criteria (trace R hand edema noted in RN flowsheets)  Malnutrition Diagnosis: Severe malnutrition in the context of acute on chronic illness    Previous Goals   Total avg nutritional intake to meet a minimum of 30 kcal/kg and 1.3 g PRO/kg  daily (per dosing wt 64 kg).  Evaluation: Not met    Previous Nutrition Diagnosis  Inadequate protein-energy intake related to NPO (vented) dependent on enteral nutrition support to meet entire nutrition needs as evidenced by feeds currently held in setting of GIB.   Evaluation: Improving    CURRENT NUTRITION DIAGNOSIS  Inadequate protein-energy intake related to NPO (vented) and dependent on enteral nutrition support to meet entire nutrition needs as evidenced by feeds being held vs kept at low rate in setting of GIB and displaced J-tube and 7-day average enteral nutrition infusions providing 874 kcals (14 kcal/kg, or 45% minimum assessed kcal needs) and 40 g protein (0.6 g protein/kg, or 48% minimum assessed protein needs).    INTERVENTIONS  Implementation  -Discussion with CVTS regarding J-tube replacement plans and readvancing to goal EN support  -Enteral Nutrition - Continue    Goals  Total avg nutritional intake to meet a minimum of 30 kcal/kg and 1.3 g PRO/kg daily (per dosing wt 64 kg).    Monitoring/Evaluation  Progress toward goals will be monitored and evaluated per protocol.     Lydia Gallardo RD, LD  Pager: [phone]

## 2021-11-09 NOTE — PROGRESS NOTES
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 Corral. 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  UGIB causing code blue on 11/2.     OVERNIGHT EVENTS:  naeo     TODAY'S PROGRESS:   IR for tube repositioning  Progress tube feeds  Lexapro  Increase metoprolol  Care conference today    PLAN:  Neuro/ pain/ sedation:  Acute postoperative pain   Anxiety and depression  Acute to subacute CVA, b/l cerebral hemispheres and L cerebellum, suspect embolic  Encephalopathy and diffuse weakness - improving slightly   R ear lateral canal floor excoriation with bleeding - resolved   - APAP and oxycodone prn, lido patch   - PTA lexapro  - appreciate ENT  - appreciate neuro    Pulmonary care:   SALTY on home CPAP  Acute hypoxic respiratory failure s/p b/l lung transplant 10/16/21  S/p tracheostomy 10/29  Empyema with Candida s/p chest tube 11/3/21  - mechanically ventilated via trach, PST   - Levalbuterol nebs and Mucomyst, chest PT  - Daily CXR  - appreciate Pulmonary  - Diurese    Cardiovascular:    HTN  Afib   PFO  Vasoplegic shock in setting of hypovolemia - resolved  - Monitor hemodynamic status.   - MAP goal <100, SBP <140  - metoprolol 75 BID    - prn labetalol  - statin  - Amiodarone 200 mg daily   - low-intensity heparin gtt     GI care/ Nutrition:   Elevated LFTs - recurrent  GI bleed 2/2 NG trauma, now recurrent secondary to GJ bumper ulcer   Moderate malnutrition in the context of acute on chronic illness  PEG-J, with malpositioned J tube   - Diet: tf    - PPI bid    Renal/ Fluid Balance/ Electrolytes:   Acute kidney injury, recurrent, now with azotemia - resolving   Hypernatremia  Hyperkalemia, resolved   BL  creat appears to be ~ 0.7  - free water flushes  - Strict I/O, daily weights  - Avoid/limit nephrotoxins as able  - Replete lytes PRN per protocol     Endocrine:    Stress induced hyperglycemia with steroid-induced component, on DM2  Hypothyroidism  RA  Preop A1c 6.6  - insulin gtt   - Goal BG <180 for optimal healing  - continue home synthroid  - lantus     ID/ Antibiotics:  Immunosuppression s/p transplant  Candidal infection of donor lungs (likely source), pleural fluid, and fungemia   - NGTD CSF. Last + blood cx 10/22. No vegetations on valves per TC 10/23  - Nystatin, Acyclovir, bactrim   - Tacrolimus (via g tube), prednisone    - appreciate transplant ID, ophthalmology    - d/w ID, no indication to treat Staph epi in sputum   - Fluconazole (end date 11/25)    Heme:     Acute blood loss anemia secondary to surgery and GI bleed, and anemia related to critical illness and iron deficiency    Hypogammaglobulinemia s/p IVIG  Thrombocytopenia, HIT negative - resolved   Lactic acidosis, resolved  Nonocclusive R subclavian thrombus    - heparin low intensity     MSK/ Skin:  Clamshell surgical incision  - Sternal precautions  - Postoperative incision management per protocol  - PT/OT/CR     Prophylaxis:    - Mechanical prophylaxis for DVT: SCDs  - Chemical DVT prophylaxis: heparin low intensity gtt    - PPI for 30 days postoperatively     Lines/ tubes/ drains:  - trach  - peg-j  - Watson 10/25  - PIVs  - chest tube L  - L PICC     Disposition:  - CVICU    Care conference to update family to be planned for this week    Patient seen, findings and plan discussed with CVICU staff.     Moises Ribera  PGY-3 Anesthesiology      ====================================    SUBJECTIVE:   naeo    OBJECTIVE:   1. VITAL SIGNS:   Temp:  [97  F (36.1  C)-97.8  F (36.6  C)] 97.7  F (36.5  C)  Pulse:  [72-99] 78  Resp:  [18-28] 22  BP: (125-171)/() 136/89  FiO2 (%):  [40 %] 40 %  SpO2:  [95 %-100 %] 99 %  Ventilation Mode: CMV/AC   (Continuous Mandatory Ventilation/ Assist Control)  FiO2 (%): 40 %  Rate Set (breaths/minute): 12 breaths/min  Tidal Volume Set (mL): 400 mL  PEEP (cm H2O): 5 cmH2O  Pressure Support (cm H2O): 7 cmH2O  Oxygen Concentration (%): 40 %  Resp: 22    2. INTAKE/ OUTPUT:   I/O last 3 completed shifts:  In: 1544.69 [I.V.:534.69; NG/GT:525]  Out: 1575 [Urine:1305; Emesis/NG output:200; Chest Tube:70]    3. PHYSICAL EXAMINATION:   General: NAD  Neuro: opens eyes to voice, wiggles toes BLE to command, no movement BUE to noxious, grimacing only to noxious of L hand and not to R - stable exam from yesterday    Resp: vented via trach, nonlabored   CV: RRR on tele   Abdomen: soft, nontender, rounded   Incisions: c/d/i  Extremities: warm and well perfused, no edema    4. INVESTIGATIONS:   Arterial Blood Gases   Recent Labs   Lab 11/06/21  0438 11/05/21  0504 11/05/21  0336 11/04/21  1016   PH 7.43 7.49* 7.49* 7.46*   PCO2 37 31* 32* 35   PO2 109* 126* 331* 95   HCO3 25 24 24 25     Complete Blood Count   Recent Labs   Lab 11/09/21  0351 11/08/21  0344 11/07/21  0518 11/06/21  0438   WBC 14.4* 16.5* 13.4* 19.7*   HGB 9.3* 9.0* 8.8* 9.5*    182 180 176     Basic Metabolic Panel  Recent Labs   Lab 11/09/21  0658 11/09/21  0539 11/09/21  0351 11/08/21  1500 11/08/21  1314 11/08/21  0346 11/08/21  0344 11/07/21  1808 11/07/21  1612   NA  --   --  143  143  --  145*  --  146*  146*  --  144   POTASSIUM  --   --  4.3  4.3  --  4.7  --  3.9  3.9  --  4.2   CHLORIDE  --   --  112*  112*  --  115*  --  115*  115*  --  116*   CO2  --   --  29  29  --  27  --  28  28  --  25   BUN  --   --  25  25  --  26  --  31*  31*  --  29   CR  --   --  0.58*  0.58*  --  0.54*  --  0.56*  0.56*  --  0.60*   * 120* 125*  125*  127*   < > 126*   < > 152*  152*   < > 139*    < > = values in this interval not displayed.     Liver Function Tests  Recent Labs   Lab 11/09/21  0351 11/08/21  0344 11/07/21  0518 11/06/21  0438   AST 17  <3 6 42   ALT 60 78* 107* 176*   ALKPHOS 132 134 134 149   BILITOTAL 0.5 0.2 0.3 0.5   ALBUMIN 1.8* 1.7* 1.8* 1.9*   INR 1.16* 1.18* 1.18* 1.26*     Pancreatic Enzymes  No lab results found in last 7 days.  Coagulation Profile  Recent Labs   Lab 11/09/21  0351 11/08/21  0344 11/07/21  0518 11/06/21  0438 11/03/21  0407 11/02/21  1053 11/02/21  0927 11/02/21  0908 11/02/21  0832   INR 1.16* 1.18* 1.18* 1.26*   < > 1.55*  --    < > 1.38*   PTT  --   --   --   --   --  34 32  --  50*    < > = values in this interval not displayed.        =========================================

## 2021-11-09 NOTE — PROGRESS NOTES
"THORACIC SURGERY PROGRESS NOTE     S: No acute events overnight, on trach dome this AM.      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 Thoracic consulted to place a PEGJ on 10/29     - Patient currently on TF at 20, with reflux into the G  - G port to gravity  - Consult to GI for assistance in repositioning J tube  - Rest of care per primary team     Dajuan Gilman PA-C  Thoracic and Foregut Surgery    "

## 2021-11-10 ENCOUNTER — APPOINTMENT (OUTPATIENT)
Dept: GENERAL RADIOLOGY | Facility: CLINIC | Age: 56
End: 2021-11-10
Attending: STUDENT IN AN ORGANIZED HEALTH CARE EDUCATION/TRAINING PROGRAM
Payer: COMMERCIAL

## 2021-11-10 ENCOUNTER — APPOINTMENT (OUTPATIENT)
Dept: OCCUPATIONAL THERAPY | Facility: CLINIC | Age: 56
End: 2021-11-10
Attending: STUDENT IN AN ORGANIZED HEALTH CARE EDUCATION/TRAINING PROGRAM
Payer: COMMERCIAL

## 2021-11-10 ENCOUNTER — APPOINTMENT (OUTPATIENT)
Dept: PHYSICAL THERAPY | Facility: CLINIC | Age: 56
End: 2021-11-10
Attending: STUDENT IN AN ORGANIZED HEALTH CARE EDUCATION/TRAINING PROGRAM
Payer: COMMERCIAL

## 2021-11-10 LAB
ALBUMIN SERPL-MCNC: 2 G/DL (ref 3.4–5)
ALP SERPL-CCNC: 167 U/L (ref 40–150)
ALT SERPL W P-5'-P-CCNC: 52 U/L (ref 0–70)
ANION GAP SERPL CALCULATED.3IONS-SCNC: 6 MMOL/L (ref 3–14)
AST SERPL W P-5'-P-CCNC: 20 U/L (ref 0–45)
BACTERIA PLR CULT: NORMAL
BILIRUB SERPL-MCNC: 0.4 MG/DL (ref 0.2–1.3)
BUN SERPL-MCNC: 32 MG/DL (ref 7–30)
BUN SERPL-MCNC: 32 MG/DL (ref 7–30)
BUN SERPL-MCNC: 35 MG/DL (ref 7–30)
CALCIUM SERPL-MCNC: 8.5 MG/DL (ref 8.5–10.1)
CALCIUM SERPL-MCNC: 8.8 MG/DL (ref 8.5–10.1)
CALCIUM SERPL-MCNC: 8.8 MG/DL (ref 8.5–10.1)
CHLORIDE BLD-SCNC: 109 MMOL/L (ref 94–109)
CHLORIDE BLD-SCNC: 109 MMOL/L (ref 94–109)
CHLORIDE BLD-SCNC: 110 MMOL/L (ref 94–109)
CO2 SERPL-SCNC: 26 MMOL/L (ref 20–32)
CO2 SERPL-SCNC: 27 MMOL/L (ref 20–32)
CO2 SERPL-SCNC: 27 MMOL/L (ref 20–32)
CREAT SERPL-MCNC: 0.64 MG/DL (ref 0.66–1.25)
ERYTHROCYTE [DISTWIDTH] IN BLOOD BY AUTOMATED COUNT: 18.2 % (ref 10–15)
GFR SERPL CREATININE-BSD FRML MDRD: >90 ML/MIN/1.73M2
GLUCOSE BLD-MCNC: 149 MG/DL (ref 70–99)
GLUCOSE BLD-MCNC: 150 MG/DL (ref 70–99)
GLUCOSE BLD-MCNC: 150 MG/DL (ref 70–99)
GLUCOSE BLDC GLUCOMTR-MCNC: 103 MG/DL (ref 70–99)
GLUCOSE BLDC GLUCOMTR-MCNC: 106 MG/DL (ref 70–99)
GLUCOSE BLDC GLUCOMTR-MCNC: 115 MG/DL (ref 70–99)
GLUCOSE BLDC GLUCOMTR-MCNC: 125 MG/DL (ref 70–99)
GLUCOSE BLDC GLUCOMTR-MCNC: 126 MG/DL (ref 70–99)
GLUCOSE BLDC GLUCOMTR-MCNC: 131 MG/DL (ref 70–99)
GLUCOSE BLDC GLUCOMTR-MCNC: 133 MG/DL (ref 70–99)
GLUCOSE BLDC GLUCOMTR-MCNC: 134 MG/DL (ref 70–99)
GLUCOSE BLDC GLUCOMTR-MCNC: 135 MG/DL (ref 70–99)
GLUCOSE BLDC GLUCOMTR-MCNC: 140 MG/DL (ref 70–99)
GLUCOSE BLDC GLUCOMTR-MCNC: 144 MG/DL (ref 70–99)
GLUCOSE BLDC GLUCOMTR-MCNC: 145 MG/DL (ref 70–99)
GLUCOSE BLDC GLUCOMTR-MCNC: 152 MG/DL (ref 70–99)
GLUCOSE BLDC GLUCOMTR-MCNC: 157 MG/DL (ref 70–99)
GLUCOSE BLDC GLUCOMTR-MCNC: 158 MG/DL (ref 70–99)
GLUCOSE BLDC GLUCOMTR-MCNC: 159 MG/DL (ref 70–99)
GLUCOSE BLDC GLUCOMTR-MCNC: 166 MG/DL (ref 70–99)
GLUCOSE BLDC GLUCOMTR-MCNC: 168 MG/DL (ref 70–99)
GLUCOSE BLDC GLUCOMTR-MCNC: 169 MG/DL (ref 70–99)
GLUCOSE BLDC GLUCOMTR-MCNC: 174 MG/DL (ref 70–99)
GLUCOSE BLDC GLUCOMTR-MCNC: 94 MG/DL (ref 70–99)
HCT VFR BLD AUTO: 32.7 % (ref 40–53)
HGB BLD-MCNC: 10.2 G/DL (ref 13.3–17.7)
INR PPP: 1.12 (ref 0.85–1.15)
MAGNESIUM SERPL-MCNC: 1.7 MG/DL (ref 1.6–2.3)
MAGNESIUM SERPL-MCNC: 1.8 MG/DL (ref 1.6–2.3)
MCH RBC QN AUTO: 30 PG (ref 26.5–33)
MCHC RBC AUTO-ENTMCNC: 31.2 G/DL (ref 31.5–36.5)
MCV RBC AUTO: 96 FL (ref 78–100)
PHOSPHATE SERPL-MCNC: 3.4 MG/DL (ref 2.5–4.5)
PHOSPHATE SERPL-MCNC: 3.6 MG/DL (ref 2.5–4.5)
PLATELET # BLD AUTO: 273 10E3/UL (ref 150–450)
POTASSIUM BLD-SCNC: 4.1 MMOL/L (ref 3.4–5.3)
POTASSIUM BLD-SCNC: 4.1 MMOL/L (ref 3.4–5.3)
POTASSIUM BLD-SCNC: 4.2 MMOL/L (ref 3.4–5.3)
PROT SERPL-MCNC: 5.3 G/DL (ref 6.8–8.8)
RBC # BLD AUTO: 3.4 10E6/UL (ref 4.4–5.9)
SODIUM SERPL-SCNC: 142 MMOL/L (ref 133–144)
UFH PPP CHRO-ACNC: 0.29 IU/ML
WBC # BLD AUTO: 21.6 10E3/UL (ref 4–11)

## 2021-11-10 PROCEDURE — 83735 ASSAY OF MAGNESIUM: CPT | Performed by: SURGERY

## 2021-11-10 PROCEDURE — 250N000013 HC RX MED GY IP 250 OP 250 PS 637: Performed by: STUDENT IN AN ORGANIZED HEALTH CARE EDUCATION/TRAINING PROGRAM

## 2021-11-10 PROCEDURE — 94003 VENT MGMT INPAT SUBQ DAY: CPT

## 2021-11-10 PROCEDURE — 250N000013 HC RX MED GY IP 250 OP 250 PS 637: Performed by: ANESTHESIOLOGY

## 2021-11-10 PROCEDURE — 94668 MNPJ CHEST WALL SBSQ: CPT

## 2021-11-10 PROCEDURE — 250N000011 HC RX IP 250 OP 636: Performed by: STUDENT IN AN ORGANIZED HEALTH CARE EDUCATION/TRAINING PROGRAM

## 2021-11-10 PROCEDURE — 97110 THERAPEUTIC EXERCISES: CPT | Mod: GO | Performed by: OCCUPATIONAL THERAPIST

## 2021-11-10 PROCEDURE — 250N000011 HC RX IP 250 OP 636

## 2021-11-10 PROCEDURE — 87070 CULTURE OTHR SPECIMN AEROBIC: CPT

## 2021-11-10 PROCEDURE — 99233 SBSQ HOSP IP/OBS HIGH 50: CPT | Mod: 24 | Performed by: PHYSICIAN ASSISTANT

## 2021-11-10 PROCEDURE — 999N000157 HC STATISTIC RCP TIME EA 10 MIN

## 2021-11-10 PROCEDURE — 250N000011 HC RX IP 250 OP 636: Performed by: THORACIC SURGERY (CARDIOTHORACIC VASCULAR SURGERY)

## 2021-11-10 PROCEDURE — 250N000009 HC RX 250: Performed by: STUDENT IN AN ORGANIZED HEALTH CARE EDUCATION/TRAINING PROGRAM

## 2021-11-10 PROCEDURE — 85520 HEPARIN ASSAY: CPT | Performed by: THORACIC SURGERY (CARDIOTHORACIC VASCULAR SURGERY)

## 2021-11-10 PROCEDURE — 200N000002 HC R&B ICU UMMC

## 2021-11-10 PROCEDURE — 250N000013 HC RX MED GY IP 250 OP 250 PS 637: Performed by: NURSE PRACTITIONER

## 2021-11-10 PROCEDURE — 87186 SC STD MICRODIL/AGAR DIL: CPT

## 2021-11-10 PROCEDURE — 97110 THERAPEUTIC EXERCISES: CPT | Mod: GP

## 2021-11-10 PROCEDURE — 250N000013 HC RX MED GY IP 250 OP 250 PS 637

## 2021-11-10 PROCEDURE — 84100 ASSAY OF PHOSPHORUS: CPT

## 2021-11-10 PROCEDURE — 250N000012 HC RX MED GY IP 250 OP 636 PS 637: Performed by: INTERNAL MEDICINE

## 2021-11-10 PROCEDURE — 250N000012 HC RX MED GY IP 250 OP 636 PS 637: Performed by: PHYSICIAN ASSISTANT

## 2021-11-10 PROCEDURE — 80048 BASIC METABOLIC PNL TOTAL CA: CPT

## 2021-11-10 PROCEDURE — 97530 THERAPEUTIC ACTIVITIES: CPT | Mod: GP

## 2021-11-10 PROCEDURE — 71045 X-RAY EXAM CHEST 1 VIEW: CPT

## 2021-11-10 PROCEDURE — 36415 COLL VENOUS BLD VENIPUNCTURE: CPT | Performed by: PHYSICIAN ASSISTANT

## 2021-11-10 PROCEDURE — 85610 PROTHROMBIN TIME: CPT | Performed by: STUDENT IN AN ORGANIZED HEALTH CARE EDUCATION/TRAINING PROGRAM

## 2021-11-10 PROCEDURE — 250N000012 HC RX MED GY IP 250 OP 636 PS 637: Performed by: NURSE PRACTITIONER

## 2021-11-10 PROCEDURE — 83735 ASSAY OF MAGNESIUM: CPT | Performed by: PHYSICIAN ASSISTANT

## 2021-11-10 PROCEDURE — 87040 BLOOD CULTURE FOR BACTERIA: CPT | Performed by: PHYSICIAN ASSISTANT

## 2021-11-10 PROCEDURE — 94640 AIRWAY INHALATION TREATMENT: CPT

## 2021-11-10 PROCEDURE — 85027 COMPLETE CBC AUTOMATED: CPT

## 2021-11-10 PROCEDURE — 84100 ASSAY OF PHOSPHORUS: CPT | Performed by: THORACIC SURGERY (CARDIOTHORACIC VASCULAR SURGERY)

## 2021-11-10 PROCEDURE — 71045 X-RAY EXAM CHEST 1 VIEW: CPT | Mod: 26 | Performed by: RADIOLOGY

## 2021-11-10 PROCEDURE — 250N000013 HC RX MED GY IP 250 OP 250 PS 637: Performed by: THORACIC SURGERY (CARDIOTHORACIC VASCULAR SURGERY)

## 2021-11-10 PROCEDURE — 94640 AIRWAY INHALATION TREATMENT: CPT | Mod: 76

## 2021-11-10 RX ORDER — FUROSEMIDE 10 MG/ML
10 INJECTION INTRAMUSCULAR; INTRAVENOUS EVERY 8 HOURS PRN
Status: DISCONTINUED | OUTPATIENT
Start: 2021-11-10 | End: 2021-11-10

## 2021-11-10 RX ORDER — MAGNESIUM SULFATE HEPTAHYDRATE 40 MG/ML
2 INJECTION, SOLUTION INTRAVENOUS ONCE
Status: COMPLETED | OUTPATIENT
Start: 2021-11-10 | End: 2021-11-10

## 2021-11-10 RX ORDER — HYDROXYZINE HYDROCHLORIDE 50 MG/1
50 TABLET, FILM COATED ORAL EVERY 6 HOURS PRN
Status: DISCONTINUED | OUTPATIENT
Start: 2021-11-10 | End: 2021-12-13 | Stop reason: HOSPADM

## 2021-11-10 RX ORDER — FUROSEMIDE 10 MG/ML
20 INJECTION INTRAMUSCULAR; INTRAVENOUS ONCE
Status: COMPLETED | OUTPATIENT
Start: 2021-11-10 | End: 2021-11-10

## 2021-11-10 RX ORDER — HYDROXYZINE HYDROCHLORIDE 25 MG/1
25 TABLET, FILM COATED ORAL EVERY 6 HOURS PRN
Status: DISCONTINUED | OUTPATIENT
Start: 2021-11-10 | End: 2021-12-13 | Stop reason: HOSPADM

## 2021-11-10 RX ORDER — HEPARIN SODIUM 10000 [USP'U]/100ML
0-5000 INJECTION, SOLUTION INTRAVENOUS CONTINUOUS
Status: DISCONTINUED | OUTPATIENT
Start: 2021-11-10 | End: 2021-11-20

## 2021-11-10 RX ADMIN — MYCOPHENOLATE MOFETIL 1000 MG: 200 POWDER, FOR SUSPENSION ORAL at 19:29

## 2021-11-10 RX ADMIN — Medication 1 PACKET: at 19:27

## 2021-11-10 RX ADMIN — Medication 1 PACKET: at 12:09

## 2021-11-10 RX ADMIN — ACETYLCYSTEINE 2 ML: 200 SOLUTION ORAL; RESPIRATORY (INHALATION) at 15:50

## 2021-11-10 RX ADMIN — LEVALBUTEROL HYDROCHLORIDE 1.25 MG: 1.25 SOLUTION RESPIRATORY (INHALATION) at 15:50

## 2021-11-10 RX ADMIN — ESCITALOPRAM 5 MG: 5 TABLET, FILM COATED ORAL at 09:09

## 2021-11-10 RX ADMIN — ACETYLCYSTEINE 2 ML: 200 SOLUTION ORAL; RESPIRATORY (INHALATION) at 19:39

## 2021-11-10 RX ADMIN — PREDNISOLONE 12.5 MG: 15 SOLUTION ORAL at 09:06

## 2021-11-10 RX ADMIN — ROSUVASTATIN CALCIUM 10 MG: 10 TABLET, FILM COATED ORAL at 09:07

## 2021-11-10 RX ADMIN — LEVALBUTEROL HYDROCHLORIDE 1.25 MG: 1.25 SOLUTION RESPIRATORY (INHALATION) at 08:25

## 2021-11-10 RX ADMIN — HYDROXYZINE HYDROCHLORIDE 25 MG: 25 TABLET, FILM COATED ORAL at 13:05

## 2021-11-10 RX ADMIN — POTASSIUM CHLORIDE 40 MEQ: 40 SOLUTION ORAL at 09:08

## 2021-11-10 RX ADMIN — TACROLIMUS 2 MG: 5 CAPSULE ORAL at 09:28

## 2021-11-10 RX ADMIN — CALCIUM CARBONATE 600 MG (1,500 MG)-VITAMIN D3 400 UNIT TABLET 1 TABLET: at 18:09

## 2021-11-10 RX ADMIN — Medication 1 PACKET: at 09:08

## 2021-11-10 RX ADMIN — LEVOTHYROXINE SODIUM 25 MCG: 0.03 TABLET ORAL at 09:07

## 2021-11-10 RX ADMIN — MAGNESIUM SULFATE HEPTAHYDRATE 2 G: 40 INJECTION, SOLUTION INTRAVENOUS at 05:23

## 2021-11-10 RX ADMIN — PREDNISOLONE 12.5 MG: 15 SOLUTION ORAL at 19:29

## 2021-11-10 RX ADMIN — AMIODARONE HYDROCHLORIDE 200 MG: 200 TABLET ORAL at 09:07

## 2021-11-10 RX ADMIN — ACYCLOVIR 400 MG: 200 SUSPENSION ORAL at 09:06

## 2021-11-10 RX ADMIN — MAGNESIUM SULFATE IN WATER 2 G: 40 INJECTION, SOLUTION INTRAVENOUS at 18:31

## 2021-11-10 RX ADMIN — LABETALOL HYDROCHLORIDE 20 MG: 5 INJECTION, SOLUTION INTRAVENOUS at 01:23

## 2021-11-10 RX ADMIN — Medication 1 PACKET: at 13:06

## 2021-11-10 RX ADMIN — HYDRALAZINE HYDROCHLORIDE 20 MG: 20 INJECTION INTRAMUSCULAR; INTRAVENOUS at 02:13

## 2021-11-10 RX ADMIN — FUROSEMIDE 20 MG: 10 INJECTION, SOLUTION INTRAMUSCULAR; INTRAVENOUS at 09:11

## 2021-11-10 RX ADMIN — NYSTATIN 1000000 UNITS: 500000 SUSPENSION ORAL at 19:27

## 2021-11-10 RX ADMIN — ACYCLOVIR 400 MG: 200 SUSPENSION ORAL at 19:29

## 2021-11-10 RX ADMIN — FLUCONAZOLE IN SODIUM CHLORIDE 400 MG: 2 INJECTION, SOLUTION INTRAVENOUS at 12:58

## 2021-11-10 RX ADMIN — Medication 40 MG: at 09:07

## 2021-11-10 RX ADMIN — ACETYLCYSTEINE 2 ML: 200 SOLUTION ORAL; RESPIRATORY (INHALATION) at 08:25

## 2021-11-10 RX ADMIN — HYDROXYZINE HYDROCHLORIDE 25 MG: 25 TABLET, FILM COATED ORAL at 04:16

## 2021-11-10 RX ADMIN — METOPROLOL TARTRATE 75 MG: 25 TABLET, FILM COATED ORAL at 09:07

## 2021-11-10 RX ADMIN — MULTIVIT AND MINERALS-FERROUS GLUCONATE 9 MG IRON/15 ML ORAL LIQUID 15 ML: at 09:07

## 2021-11-10 RX ADMIN — Medication 10 MG: at 19:27

## 2021-11-10 RX ADMIN — NYSTATIN 1000000 UNITS: 500000 SUSPENSION ORAL at 16:48

## 2021-11-10 RX ADMIN — Medication 40 MG: at 19:29

## 2021-11-10 RX ADMIN — ACETAMINOPHEN 975 MG: 325 TABLET, FILM COATED ORAL at 03:55

## 2021-11-10 RX ADMIN — SULFAMETHOXAZOLE AND TRIMETHOPRIM 80 MG: 200; 40 SUSPENSION ORAL at 09:06

## 2021-11-10 RX ADMIN — CALCIUM CARBONATE 600 MG (1,500 MG)-VITAMIN D3 400 UNIT TABLET 1 TABLET: at 09:07

## 2021-11-10 RX ADMIN — NYSTATIN 1000000 UNITS: 500000 SUSPENSION ORAL at 09:08

## 2021-11-10 RX ADMIN — TACROLIMUS 2 MG: 5 CAPSULE ORAL at 18:09

## 2021-11-10 RX ADMIN — NYSTATIN 1000000 UNITS: 500000 SUSPENSION ORAL at 12:09

## 2021-11-10 RX ADMIN — HYDROXYZINE HYDROCHLORIDE 50 MG: 50 TABLET, FILM COATED ORAL at 22:48

## 2021-11-10 RX ADMIN — LEVALBUTEROL HYDROCHLORIDE 1.25 MG: 1.25 SOLUTION RESPIRATORY (INHALATION) at 19:39

## 2021-11-10 RX ADMIN — METOPROLOL TARTRATE 75 MG: 25 TABLET, FILM COATED ORAL at 19:27

## 2021-11-10 RX ADMIN — HUMAN INSULIN 3 UNITS/HR: 100 INJECTION, SOLUTION SUBCUTANEOUS at 19:25

## 2021-11-10 RX ADMIN — HUMAN INSULIN 3 UNITS/HR: 100 INJECTION, SOLUTION SUBCUTANEOUS at 00:21

## 2021-11-10 RX ADMIN — FUROSEMIDE 20 MG: 10 INJECTION, SOLUTION INTRAMUSCULAR; INTRAVENOUS at 14:51

## 2021-11-10 RX ADMIN — MYCOPHENOLATE MOFETIL 1000 MG: 200 POWDER, FOR SUSPENSION ORAL at 09:06

## 2021-11-10 RX ADMIN — HEPARIN SODIUM 800 UNITS/HR: 10000 INJECTION, SOLUTION INTRAVENOUS at 04:13

## 2021-11-10 RX ADMIN — LEVALBUTEROL HYDROCHLORIDE 1.25 MG: 1.25 SOLUTION RESPIRATORY (INHALATION) at 12:21

## 2021-11-10 RX ADMIN — ACETYLCYSTEINE 2 ML: 200 SOLUTION ORAL; RESPIRATORY (INHALATION) at 12:21

## 2021-11-10 ASSESSMENT — ACTIVITIES OF DAILY LIVING (ADL)
ADLS_ACUITY_SCORE: 25
ADLS_ACUITY_SCORE: 25
ADLS_ACUITY_SCORE: 27
ADLS_ACUITY_SCORE: 27
ADLS_ACUITY_SCORE: 25
ADLS_ACUITY_SCORE: 27
ADLS_ACUITY_SCORE: 25
ADLS_ACUITY_SCORE: 27
ADLS_ACUITY_SCORE: 25
ADLS_ACUITY_SCORE: 27
ADLS_ACUITY_SCORE: 25
ADLS_ACUITY_SCORE: 27
ADLS_ACUITY_SCORE: 25
ADLS_ACUITY_SCORE: 27
ADLS_ACUITY_SCORE: 25
ADLS_ACUITY_SCORE: 27

## 2021-11-10 NOTE — PROGRESS NOTES
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 Corral. 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  UGIB causing code blue on 11/2.     OVERNIGHT EVENTS:  naeo     TODAY'S PROGRESS:   ?high intensity gtt - ask gi  Trach dome  Ask thoracic about suture  Trachea aspirate culture        PLAN:  Neuro/ pain/ sedation:  Acute postoperative pain   Anxiety and depression  Acute to subacute CVA, b/l cerebral hemispheres and L cerebellum, suspect embolic  Encephalopathy and diffuse weakness - improving slightly   R ear lateral canal floor excoriation with bleeding - resolved   - APAP and oxycodone prn, lido patch   - PTA lexapro  - appreciate ENT  - appreciate neuro    Pulmonary care:   SALTY on home CPAP  Acute hypoxic respiratory failure s/p b/l lung transplant 10/16/21  S/p tracheostomy 10/29  Empyema with Candida s/p chest tube 11/3/21  - mechanically ventilated via trach, PST   - Levalbuterol nebs and Mucomyst, chest PT  - Daily CXR  - appreciate Pulmonary  - Diurese    Cardiovascular:    HTN  Afib   PFO  Vasoplegic shock in setting of hypovolemia - resolved  - Monitor hemodynamic status.   - MAP goal <100, SBP <140  - metoprolol 75 BID    - prn labetalol  - statin  - Amiodarone 200 mg daily   - low-intensity heparin gtt     GI care/ Nutrition:   Elevated LFTs - recurrent  GI bleed 2/2 NG trauma, now recurrent secondary to GJ bumper ulcer   Moderate malnutrition in the context of acute on chronic illness  PEG-J, with malpositioned J tube   - Diet: tf    - PPI bid    Renal/ Fluid Balance/ Electrolytes:   Acute kidney injury, recurrent, now with azotemia - resolving   Hypernatremia  Hyperkalemia, resolved   BL  creat appears to be ~ 0.7  - free water flushes  - Strict I/O, daily weights  - Avoid/limit nephrotoxins as able  - Replete lytes PRN per protocol     Endocrine:    Stress induced hyperglycemia with steroid-induced component, on DM2  Hypothyroidism  RA  Preop A1c 6.6  - insulin gtt   - Goal BG <180 for optimal healing  - continue home synthroid  - lantus 15     ID/ Antibiotics:  Immunosuppression s/p transplant  Candidal infection of donor lungs (likely source), pleural fluid, and fungemia   - NGTD CSF. Last + blood cx 10/22. No vegetations on valves per TC 10/23  - Nystatin, Acyclovir, bactrim   - Tacrolimus (via g tube), prednisone    - appreciate transplant ID, ophthalmology    - d/w ID, no indication to treat Staph epi in sputum   - Fluconazole (end date 11/25)    Heme:     Acute blood loss anemia secondary to surgery and GI bleed, and anemia related to critical illness and iron deficiency    Hypogammaglobulinemia s/p IVIG  Thrombocytopenia, HIT negative - resolved   Lactic acidosis, resolved  Nonocclusive R subclavian thrombus    - heparin low intensity     MSK/ Skin:  Clamshell surgical incision  - Sternal precautions  - Postoperative incision management per protocol  - PT/OT/CR     Prophylaxis:    - Mechanical prophylaxis for DVT: SCDs  - Chemical DVT prophylaxis: heparin low intensity gtt    - PPI      Lines/ tubes/ drains:  - trach  - peg-j  - Watson 10/25  - PIVs  - chest tube L  - L PICC     Disposition:  - CVICU      Patient seen, findings and plan discussed with CVICU staff.     Moises Ribera  PGY-3 Anesthesiology      ====================================    SUBJECTIVE:   naeo    OBJECTIVE:   1. VITAL SIGNS:   Temp:  [97.2  F (36.2  C)-98.5  F (36.9  C)] 97.2  F (36.2  C)  Pulse:  [] 79  Resp:  [18-24] 24  BP: ()/(56-97) 103/60  FiO2 (%):  [40 %] 40 %  SpO2:  [93 %-100 %] 98 %  Ventilation Mode: CMV/AC  (Continuous Mandatory Ventilation/ Assist Control)  FiO2 (%): 40 %  Rate Set  (breaths/minute): 12 breaths/min  Tidal Volume Set (mL): 400 mL  PEEP (cm H2O): 5 cmH2O  Pressure Support (cm H2O): 7 cmH2O  Oxygen Concentration (%): 40 %  Resp: 24    2. INTAKE/ OUTPUT:   I/O last 3 completed shifts:  In: 1574.8 [I.V.:509.8; NG/GT:510]  Out: 1890 [Urine:1495; Emesis/NG output:275; Chest Tube:120]    3. PHYSICAL EXAMINATION:   General: NAD  Neuro: opens eyes to voice, wiggles toes BLE to command, no movement BUE to noxious, grimacing only to noxious of L hand and not to R - stable exam from yesterday    Resp: vented via trach, nonlabored   CV: RRR on tele   Abdomen: soft, nontender, rounded   Incisions: c/d/i  Extremities: warm and well perfused, no edema    4. INVESTIGATIONS:   Arterial Blood Gases   Recent Labs   Lab 11/06/21  0438 11/05/21  0504 11/05/21  0336 11/04/21  1016   PH 7.43 7.49* 7.49* 7.46*   PCO2 37 31* 32* 35   PO2 109* 126* 331* 95   HCO3 25 24 24 25     Complete Blood Count   Recent Labs   Lab 11/10/21  0329 11/09/21  0351 11/08/21  0344 11/07/21  0518   WBC 21.6* 14.4* 16.5* 13.4*   HGB 10.2* 9.3* 9.0* 8.8*    200 182 180     Basic Metabolic Panel  Recent Labs   Lab 11/10/21  0646 11/10/21  0611 11/10/21  0530 11/10/21  0406 11/10/21  0325 11/09/21  1527 11/09/21  1525 11/09/21  0539 11/09/21  0351 11/08/21  1500 11/08/21  1314   NA  --   --   --   --  142  142  --  144  --  143  143  --  145*   POTASSIUM  --   --   --   --  4.1  4.1  --  4.4  --  4.3  4.3  --  4.7   CHLORIDE  --   --   --   --  109  109  --  110*  --  112*  112*  --  115*   CO2  --   --   --   --  27  27  --  28  --  29  29  --  27   BUN  --   --   --   --  32*  32*  --  25  --  25  25  --  26   CR  --   --   --   --  0.64*  0.64*  --  0.65*  --  0.58*  0.58*  --  0.54*   * 106* 134* 140* 150*  150*   < > 128*   < > 125*  125*  127*   < > 126*    < > = values in this interval not displayed.     Liver Function Tests  Recent Labs   Lab 11/10/21  0325 11/09/21  0351 11/08/21  0344  11/07/21  0518   AST 20 17 <3 6   ALT 52 60 78* 107*   ALKPHOS 167* 132 134 134   BILITOTAL 0.4 0.5 0.2 0.3   ALBUMIN 2.0* 1.8* 1.7* 1.8*   INR 1.12 1.16* 1.18* 1.18*     Pancreatic Enzymes  No lab results found in last 7 days.  Coagulation Profile  Recent Labs   Lab 11/10/21  0325 11/09/21  0351 11/08/21  0344 11/07/21  0518   INR 1.12 1.16* 1.18* 1.18*        =========================================

## 2021-11-10 NOTE — PROGRESS NOTES
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 Recently has been having tube feeds reflux out the gastrostomy suggesting that tip of jejunal limb has coiled into the stomach. AXR today with GJ tip projecting over proximal duodenum.    Now s/p GJ tube replacement 11/9 with new [id number] one piece GJ with three quadrant T-tag gastropexy. Tube feeds running, okay to restart heparin.    RECOMMENDATIONS:  -T-tags (white buttons) should be removed 2 weeks following procedure (11/23) by lifting the button and cutting the underlying blue suture. Often times the buttons fall off prior to the 2 week cr.  -OK to resume heparin    The inpatient advanced endoscopy/pancreaticobiliary service will sign off at this time. Please call or repost with questions or if status changes. Thank you for allowing us to participate in the care of this patient.    Discussed with primary team    The patient was discussed and plan agreed upon with GI staff, Dr Rodriguez.      Maria Elena Ha PA-C  Advanced Endoscopy/Pancreaticobiliary GI Service  [facility]  Text Page  _______________________________________________________________      Subjective: Nursing notes and 24hr events reviewed. Patient seen and examined at 0945. Patient lethargic today, NAEON. Tube feeds running at goal.    ROS:   4 pt ROS negative unless noted in subjective.     Objective:  Blood pressure 119/63, pulse 89, temperature (!) 96.4  F (35.8  C), temperature source Axillary, resp. rate 19, height 1.626 m (5' 4\"), weight 70 kg (154 lb 5.2 oz), SpO2 95 " %.  Gen: Laying in bed. Appears comfortable  Resp: trached, vented  Abd: Soft, NT, ND, GJ tube with three t-tags, tube feeds running, G tube clamped  Skin:  no jaundice      Date 11/10/21 0700 - 11/11/21 0659   Shift 1373-4255 [id number] 24 Hour Total   INTAKE   I.V. 9.5   9.5   Shift Total(mL/kg) 9.5(0.14)   9.5(0.14)   OUTPUT   Urine 85   85   Emesis/NG output 20   20   Chest Tube(mL/kg) 0(0)   0(0)   Shift Total(mL/kg) 105(1.5)   105(1.5)   Weight (kg) 70 70 70 70         PROCEDURES:  EGD 11/9 Dr. Rodriguez  - Diagnostic EGD performed showing coiled GJ tube in                          the stomach and previously placed clips in the gastric                          body near the gastrostomy                          - Three quadrant gastropexy performed with T-tags to                          secure the imature tract first                          - GJ tube exchanged for a new 18 Fr x 45 cm one piece                          GJ tube with the tip in the jejunum confirmed                          fluoroscopically                          - No specimens collected.    LABS:  [facility]  Recent Labs   Lab 11/10/21  0850 11/10/21  0646 11/10/21  0611 11/10/21  0530 11/10/21  0406 11/10/21  0325 11/09/21  1527 11/09/21  1525 11/09/21  0539 11/09/21  0351 11/08/21  1500 11/08/21  1314   NA  --   --   --   --   --  142  142  --  144  --  143  143  --  145*   POTASSIUM  --   --   --   --   --  4.1  4.1  --  4.4  --  4.3  4.3  --  4.7   CHLORIDE  --   --   --   --   --  109  109  --  110*  --  112*  112*  --  115*   ERIK  --   --   --   --   --  8.8  8.8  --  8.7  --  8.6  8.6  --  8.3*   CO2  --   --   --   --   --  27  27  --  28  --  29  29  --  27   BUN  --   --   --   --   --  32*  32*  --  25  --  25  25  --  26   CR  --   --   --   --   --  0.64*  0.64*  --  0.65*  --  0.58*  0.58*  --  0.54*   * 115* 106* 134*   < > 150*  150*   < > 128*   < > 125*  125*  127*   < > 126*    < > = values in this  interval not displayed.     CBC  Recent Labs   Lab 11/10/21  0329 11/09/21  0351 11/08/21  0344 11/07/21  0518   WBC 21.6* 14.4* 16.5* 13.4*   RBC 3.40* 3.08* 2.94* 2.89*   HGB 10.2* 9.3* 9.0* 8.8*   HCT 32.7* 30.0* 28.4* 27.8*   MCV 96 97 97 96   MCH 30.0 30.2 30.6 30.4   MCHC 31.2* 31.0* 31.7 31.7   RDW 18.2* 18.2* 18.2* 17.9*    200 182 180     INR  Recent Labs   Lab 11/10/21  0325 11/09/21  0351 11/08/21  0344 11/07/21  0518   INR 1.12 1.16* 1.18* 1.18*     LFTs  Recent Labs   Lab 11/10/21  0325 11/09/21  0351 11/08/21  0344 11/07/21  0518   ALKPHOS 167* 132 134 134   AST 20 17 <3 6   ALT 52 60 78* 107*   BILITOTAL 0.4 0.5 0.2 0.3   PROTTOTAL 5.3* 5.0* 4.6* 4.6*   ALBUMIN 2.0* 1.8* 1.7* 1.8*      PANCNo lab results found in last 7 days.      IMAGING:  reviewed

## 2021-11-10 NOTE — PLAN OF CARE
Major Shift Events: Afebrile. Anxious and restless - RR 25-30 and HR 100s, prn atarax given. Hydralazine and labetolol given for SBP>140. Watson in place, good output. TF at goal through J tube, tolerating well. G tube to gravity, bag continuously filling up with air. BG 100s-150s, on insulin gtt at algorithm 3. Smear BMx2. Passing gas. Magnesium replaced per protocol. Heparin gtt therapeutic at 800 units/hr.     Plan: Continue to work with therapies and trach dome as able.     For vital signs and complete assessments, please see documentation flowsheets.

## 2021-11-10 NOTE — PROGRESS NOTES
CLINICAL NUTRITION SERVICES - BRIEF NOTE   (see RD note on 11/09 for full assessment)    Brown, loose stools with TF @ goal rate.  Adding fiber in effort to thicken stools and promote gut microbiome.      Nutrition changes today:   - Ordered banatrol (1 pkt BID, 4 g fiber/day)     Sona Amezquita, ARMOND, LD  [id number]  Pgr: [id number]

## 2021-11-10 NOTE — PROGRESS NOTES
Pulmonary Medicine  Cystic Fibrosis - Lung Transplant Team  Progress Note  11/10/2021       Patient: Edson Thornton  MRN: [id number]  : 1965 (age 56 year old)  Transplant: 10/16/2021 (Lung), POD#25  Admission date: 2021    Assessment & Plan:     Edson Thornton is a 56 year old male with a PMH significant for NSIP/ILD, bronchiectasis, moderate PH, RA, SALTY, chronic HSV infection, hypogammaglobulinemia, steroid-induced diabetes, hypothyroidism, PFO, HTN, HLD, duodenal anomaly, anxiety, and depression.  Admitted on 21 from OSH for acute on chronic respiratory failure 2/2 ILD exacerbation, now s/p BSLT on 10/16/21.  Prolonged vent wean s/p trach and PEG/J tube placement with thoracic surgery 10/29.  Post-op course otherwise complicated by encephalopathy and diffuse weakness, acute to subacute CVA, afib with RVR, BRENNAN, GI bleed, Candidemia/Candida empyema, and recurrent fevers.  Code called  for bradycardia/asystole and progressively hypotensive, found to have acute drop in hemoglobin and bloody G tube output.  Mentation and weakness slowly improving.     Today's recommendations:   - PS and TD trials BID as tolerated during the day, continue CMV overnight  - Diuresis per ICU team  - CXR daily with left chest tube in place  - Tentative plan for inspection bronch  at 1300  - Tacrolimus level ordered tomorrow  - Prednisolone taper due  (not yet ordered)  - CMV and EBV ordered   - Continue acyclovir through through 11/15  - Repeat sputum and blood cultures given leukocytosis (ordered)  - Coccidioides Ag ordered   - Continue fluconazole through , EKG weekly (, ordered) for QTc monitoring  - IgG ordered   - DSA ordered 11/15  - PHS high risk donor risk labs ordered 11/15     S/p BSLT for ILD:  Acute hypoxic respiratory failure s/p trach:   Pulmonary edema:  Bilateral pleural effusions: Unfortunately had not received vaccination for flu, PNA, or COVID-19 PTA.  Explant  pathology with NSIP, no malignancy.  PGD 2-3.  Weaned off paralytic 10/19 (for vent dyssynchrony) and Caroline 10/22.  Initial difficulty weaning sedation given agitation then with neurological findings as below.  CXR initially post-op with pulmonary edema, aggressively diuresed.  Recurring fevers post-op, see ID section below.  Prolonged vent wean s/p trach and PEG/J tube placement with thoracic surgery 10/29.  Most recent bronch 10/30 with thick secretions in the RUL and LLL and mild mucosal erythema, bilateral anastomoses intact.  Code called 11/2 for bradycardia/asystole (required 1 round of CPR, no medications) then progressively hypotensive, GI bleed as below.  Chest CT 11/2 with increased bilateral pleural effusions (moderate left, small right), bibasilar atelectasis with area of hypoenhancing parenchyma in LLL (suspicious for infection), and numerous nondisplaced anterior rib fractures bilaterally.  S/p left chest tube placement in IR 11/3, right deferred given very little effusion on US.  Intermittently tolerating PS trials.  - Nebs: levalbuterol and Mucomyst QID  - Aggressive pulmonary toilet with chest physiotherapy QID  - Ventilator management per ICU team, PS and TD trials BID as tolerated, continue CMV overnight  - Diuresis per ICU team  - CXR daily with left chest tube in place, today with increased perihilar and bibasilar mixed pulmonary opacities and stable small bilateral pleural effusions (personally reviewed with Dr. Mckeon)  - Tentative plan for inspection bronch 11/12 at 1300     Immunosuppression: s/p induction therapy with basiliximab 10/16 (and high dose IV steroid) and 10/20  - Tacrolimus 2 mg BID (increased 11/8, via G tube).  Goal level 8-12.  Repeat level 11/11 (ordered).  - MMF 1000 mg BID (11/2, decreased given GI bleed, AZA to be avoided given TPMT)  - Prednisolone 12.5 mg BID, next taper due 11/21 (not yet ordered)  Date AM dose (mg) PM dose (mg)   11/7/21 12.5 12.5   11/21/21 12.5 10    12/5/21 10 10   1/2/22 10 7.5   1/30/22 7.5 7.5   2/27/22 7.5 5   3/27/22 5 5   4/24/22 5 2.5      Prophylaxis:   - Bactrim for PJP ppx (held 11/2-11/5 d/t BRENNAN)  - Nystatin for oral candidiasis ppx, 6 month course  - See below for serologies and viral ppx:    Donor Recipient Intervention   CMV status Negative Negative None, CMV monthly (ordered 11/16)   EBV status Positive Positive None, EBV monthly (ordered 11/16)   HSV status N/A Positive Acyclovir POD #1-30 through 11/15 (ordered)  (recent infection history pre-txp)      ID: Concern for possible Strongyloides exposure pre-transplant s/p ivermectin x1 dose (9/17).  Donor and recipient cultures NGTD.  S/p IV ceftazidime/vancomycin for 48h per protocol and additional empiric ceftazidime 10/19-10/23 given recurrent fevers.  Bronch cultures 10/17 with Staph epi.  Cryptococcal Ag negative 10/23.  Worsening leukocytosis 11/10.  - Repeat sputum and blood cultures (ordered)  - Monthly Coccidioides Ag x12 months post-transplant per ID (urine and serum negative 10/17), due 11/17 (ordered)     Recurring fevers, Resolved:  Fevers post-op, Tmax 101.7 POD #1.  Febrile with worsening leukocytosis again 10/25, generally persisting.  Trach sputum culture (10/25) with GPC, normal jean marie.  LP (10/29), xanthochromic with pleocytosis thought to be appropriate given RBC and WBCs, no ABX recommended per transplant ID and neurology.  Bronch culture (10/30) with GPC in clusters, normal jean marie.  S/p empiric meropenem 10/28-11/2.  Trach sputum culture (11/2) with Staph epi, treatment not indicated per transplant ID.  Now afebrile.     Disseminated Candida: Noted on blood cultures 10/20 and 10/22.  BDG fungitell positive (399) on 10/20.  Respiratory cultures with persistent Candida albicans.  TC 10/23 without evidence of endocarditis.  Ophthalmology consult 10/24 with benign dilated fundoscopic exam.  Candida empyema also noted 10/25, chest tubes inadvertently removed by CVTS 10/28, left  chest tube replaced by IR 11/3 as above with ongoing Candida on cultures.  Most recent blood culture 11/2 negative.  - Fluconazole (10/26-11/25 per transplant ID, prior micafungin 10/22-10/27), repeat EKG for QTc monitoring 11/16 (ordered), LFTs as below (see transplant ID note 11/5, will need repeat imaging to ensure left pleural effusion almost entirely resolved before removing chest tube and concluding fluconazole)      HSV: Chronic intermittent active infection pre-transplant with recent HSV infection: crusted lesions throughout left side of jaw, s/p 10 day treatment course of ACV through 10/9.  HSV PCR blood negative 10/17.  - ACV ppx as above (started POD #1 instead of POD #8 given HSV history and location)     Hypogammaglobulinemia: IgG previously low at 364 (9/7).  Noted at 265 at time of transplant, s/p IVIG with premedication 10/21.    - Repeat IgG 11/17 (ordered)     Positive cross match: Note that he received two doses of rituximab in June, which is likely contributing to cross match result.  DSA most recently negative 11/1.  - DSA monitoring q2 weeks (11/15, ordered)     PHS risk criteria donor:  Additional labs required post-transplant (between 4-8 weeks post-op): Hepatitis B, Hepatitis C, and HIV by VISHAL (ordered 11/15).     SALTY: Noted pre-transplant.  Home CPAP 6-12 cm H2O.  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  MRI 10/23 with multifocal subacute infarcts within both cerebral hemispheres and left cerebellum.  DDx include surgery v embolic v infectious.  Heparin drip started 10/23 (intermittently held with GI bleed as below).  Repeat stroke code 10/25 d/t marked decrease in  responsiveness with sedation wean, pupil inequality, and absent gag reflex.  CTA head without obvious new pathology, MRI brain (with and without contrast) primarily revealing for infarct, low likelihood of PRES.  Ammonia normal.  VEEG per neuro with severe diffuse encephalopathy.  Gradual improvement noted since 10/29, repeat head CT 11/2 (following code) without new acute intracranial abnormalities.  - AC management per primary team     Afib with RVR: Noted 10/18, started on amiodarone drip and transitioned to PO 10/21, dose decreased 10/29 (anticipate 4 week course).  Currently in SR.  Heparin drip as above.  Metoprolol resumed 11/3.     Right subclavian DVT: Seen on UE US from 11/4.  Heparin drip resumed 11/5.        Recurrent GI bleed: Hemoglobin dropped to 6.6 10/22, s/p 2 units pRBC.  OG tube with bloody output, EGD 10/23 noted NJ/OG tube trauma with scant oozing.  Progressive hypotension 11/2, hemoglobin 5.8 with bloody G tube output.  Heparin drip held, transitioned to PPI drip, and MTP activated.  EGD 11/2 with large amount of clotted blood in stomach and area of raised mucosa with small adherent clot near PEG tube site that was clipped, no active bleeding.  Abdominal CT 11/2 with stomach distended with blood products and dilated small bowel.  Maroon stools 11/3, repeat EGD with extensive old blood in stomach, no active bleeding, small nodular area with prior clips clipped again.  Most recent EGD 11/5 with ulcer noted at PEG tube bumper site, gastritis, and suction marks from G tube, and GJ arm is in the duodenal bulb.  Large amount of TF noted in G tube drainage 11/7.  AXR 11/9 with GJ tube tip projecting over proximal duodenum.  GI exchanged GJ tube 11/9.  - Management (including AC) per GI and primary team     BRENNAN, Improving:   Hyperkalemia: Baseline creatinine 0.7.  BRENNAN noted POD #1, UO also downtrending.  Improved with aggressive diuresis, Cr worsened again (1.38 on 11/2) along with up trending BUN and  "hyperkalemia.  Nephrology consulted 11/1, thought to be prerenal/component of ATN.  Now improving.     Elevated LFTs, Improving: Shock liver post-op.  Initial ALT//230 evening of surgery, then with marked increase to 1550/1246 POD #1.  Had improved although now with acute rise again likely in setting of shock.  ALT/AST now improved although alk phos elevated (167) on 11/10.    We appreciate the excellent care provided by the CVICU and CVTS teams.  Recommendations communicated via in person rounding and this note.  Will continue to follow along closely, please do not hesitate to call with any questions or concerns.    Patient seen and discussed with Dr. Mckeon.    Keyla Rice PA-C  Inpatient PRINCESS  Pulmonary CF/Transplant     Subjective & Interval History:     Anxious and restless at times.  On CMV 12/400/5/40, tolerated PS 7/5 for 1h and TD with FiO2 40% for 1-2h BID yesterday.  Received chest physiotherapy QID.  Minimal secretions per nursing.  Left chest tube remains.  GI exchanged GJ tube yesterday, TF back at goal via J tube.  Having smear BMs.  G tube to gravity with a lot of air.  Slightly net negative with diuresis yesterday.      Review of Systems:     C: No fever  INTEGUMENTARY/SKIN: No rash or obvious new lesions  ENT/MOUTH: No nasal drainage  RESP: See interval history  CV: No chest pain, no peripheral edema  GI: No nausea, no vomiting  : + Watson  MUSCULOSKELETAL: No myalgias, no arthralgias  ENDOCRINE: Blood sugars with adequate control  NEURO: No headache  PSYCHIATRIC: See interval history    Physical Exam:     Vital signs:  Temp: 97.2  F (36.2  C) Temp src: Axillary BP: 103/60 Pulse: 79   Resp: 24 SpO2: 98 % O2 Device: Mechanical Ventilator Oxygen Delivery: 45 LPM Height: 162.6 cm (5' 4\") Weight: 70 kg (154 lb 5.2 oz)  I/O:     Intake/Output Summary (Last 24 hours) at 11/10/2021 0840  Last data filed at 11/10/2021 0700  Gross per 24 hour   Intake 1761.3 ml   Output 1650 ml   Net 111.3 ml "     Constitutional: Lying in bed, in no apparent distress.   HEENT: Eyes with pink conjunctivae, anicteric.  Oral mucosa moist without lesions.  Trach cdi.  PULM: Good air flow bilaterally.  Few scattered coarse crackles.  No rhonchi, no wheezes.  Non-labored breathing on PS 7/5 with FiO2 40%.  CV: Normal S1 and S2.  RRR.  No murmur, gallop, or rub.  No peripheral edema.   ABD: NABS, soft, nontender, nondistended.    MSK: Able to squeeze with LUE (not RUE) and wiggle bilateral toes.  No apparent muscle wasting.   NEURO: Opens eyes to voice, following some commands, able to answer some yes/no questions by nodding/shaking head.  SKIN: Warm, dry.  No rash on limited exam.   PSYCH: Calm.     Lines, Drains, and Devices:  Peripheral IV 11/02/21 Anterior;Distal;Right Lower forearm (Active)   Site Assessment WDL 11/10/21 0400   Line Status Infusing 11/10/21 0400   Dressing Intervention Dressing reinforced 11/08/21 2000   Phlebitis Scale 0-->no symptoms 11/10/21 0400   Infiltration Scale 0 11/10/21 0400   Infiltration Site Treatment Method  None 11/10/21 0400   Number of days: 8       Peripheral IV 11/02/21 Anterior;Right Upper forearm (Active)   Site Assessment WDL 11/10/21 0400   Line Status Infusing 11/10/21 0400   Dressing Intervention Dressing reinforced 11/08/21 2000   Phlebitis Scale 0-->no symptoms 11/10/21 0400   Infiltration Scale 0 11/10/21 0400   Infiltration Site Treatment Method  None 11/10/21 0400   Number of days: 8       PICC Triple Lumen 11/04/21 Left Basilic Access. PICC okay to use. (Active)   Site Assessment WDL 11/10/21 0400   External Cath Length (cm) 1 cm 11/04/21 1747   Extremity Circumference (cm) 28 cm 11/04/21 1747   Dressing Intervention Chlorhexidine patch;Transparent 11/10/21 0400   Dressing Change Due 11/14/21 11/09/21 2100   Gray - Status saline locked 11/10/21 0400   Gray - Cap Change Due 11/12/21 11/09/21 2100   Red - Status saline locked 11/10/21 0400   Red - Cap Change Due 11/12/21  11/09/21 2100   White - Status infusing 11/10/21 0400   White - Cap Change Due 11/12/21 11/09/21 2100   Extravasation? No 11/09/21 2100   Line Necessity Yes, meets criteria 11/10/21 0400   Number of days: 6     Data:     LABS    CMP:   Recent Labs   Lab 11/10/21  0646 11/10/21  0611 11/10/21  0530 11/10/21  0406 11/10/21  0325 11/09/21  1527 11/09/21  1525 11/09/21  0539 11/09/21  0351 11/08/21  1500 11/08/21  1314 11/08/21  0346 11/08/21  0344 11/07/21  0626 11/07/21  0518   NA  --   --   --   --  142  142  --  144  --  143  143  --  145*  --  146*  146*   < > 146*  146*   POTASSIUM  --   --   --   --  4.1  4.1  --  4.4  --  4.3  4.3  --  4.7  --  3.9  3.9   < > 3.5  3.5   CHLORIDE  --   --   --   --  109  109  --  110*  --  112*  112*  --  115*  --  115*  115*   < > 114*  114*   CO2  --   --   --   --  27  27  --  28  --  29  29  --  27  --  28  28   < > 26  26   ANIONGAP  --   --   --   --  6  6  --  6  --  2*  2*  --  3  --  3  3   < > 6  6   * 106* 134* 140* 150*  150*   < > 128*   < > 125*  125*  127*   < > 126*   < > 152*  152*   < > 113*  113*   BUN  --   --   --   --  32*  32*  --  25  --  25  25  --  26  --  31*  31*   < > 29  29   CR  --   --   --   --  0.64*  0.64*  --  0.65*  --  0.58*  0.58*  --  0.54*  --  0.56*  0.56*   < > 0.58*  0.58*   GFRESTIMATED  --   --   --   --  >90  >90  --  >90  --  >90  >90  --  >90  --  >90  >90   < > >90  >90   ERIK  --   --   --   --  8.8  8.8  --  8.7  --  8.6  8.6  --  8.3*  --  8.6  8.6   < > 8.3*  8.3*   MAG  --   --   --   --  1.7  --  2.1  --  1.8  --  2.2  --  1.8  --  1.9   PHOS  --   --   --   --  3.4  --   --   --  3.6  --   --   --  2.8  --  2.3*   PROTTOTAL  --   --   --   --  5.3*  --   --   --  5.0*  --   --   --  4.6*  --  4.6*   ALBUMIN  --   --   --   --  2.0*  --   --   --  1.8*  --   --   --  1.7*  --  1.8*   BILITOTAL  --   --   --   --  0.4  --   --   --  0.5  --   --   --  0.2  --  0.3   ALKPHOS  --    --   --   --  167*  --   --   --  132  --   --   --  134  --  134   AST  --   --   --   --  20  --   --   --  17  --   --   --  <3  --  6   ALT  --   --   --   --  52  --   --   --  60  --   --   --  78*  --  107*    < > = values in this interval not displayed.     CBC:   Recent Labs   Lab 11/10/21  0329 11/09/21  0351 11/08/21  0344 11/07/21  0518   WBC 21.6* 14.4* 16.5* 13.4*   RBC 3.40* 3.08* 2.94* 2.89*   HGB 10.2* 9.3* 9.0* 8.8*   HCT 32.7* 30.0* 28.4* 27.8*   MCV 96 97 97 96   MCH 30.0 30.2 30.6 30.4   MCHC 31.2* 31.0* 31.7 31.7   RDW 18.2* 18.2* 18.2* 17.9*    200 182 180       INR:   Recent Labs   Lab 11/10/21  0325 11/09/21  0351 11/08/21  0344 11/07/21  0518   INR 1.12 1.16* 1.18* 1.18*       Glucose:   Recent Labs   Lab 11/10/21  0646 11/10/21  0611 11/10/21  0530 11/10/21  0406 11/10/21  0325 11/10/21  0324   * 106* 134* 140* 150*  150* 145*       Blood Gas:   Recent Labs   Lab 11/07/21  0626 11/06/21  0438 11/05/21  0504   PHV 7.45*  --   --    PCO2V 40  --   --    PO2V 38  --   --    HCO3V 28  --   --    LORI 3.3*  --   --    O2PER 40 40 40       Culture Data No results for input(s): CULT in the last 168 hours.    Virology Data:   Lab Results   Component Value Date    FLUAH1 Negative 10/17/2021    FLUAH3 Negative 10/17/2021    IK3901 Negative 10/17/2021    IFLUB Negative 10/17/2021    RSVA Negative 10/17/2021    RSVB Negative 10/17/2021    PIV1 Negative 10/17/2021    PIV2 Negative 10/17/2021    PIV3 Negative 10/17/2021    HMPV Negative 10/17/2021    HRVS Negative 10/17/2021    ADVBE Negative 10/17/2021    ADVC Negative 10/17/2021       Historical CMV results (last 3 of prior testing):  Lab Results   Component Value Date    CMVQNT Not Detected 10/26/2021     No results found for: CMVLOG    Urine Studies    Recent Labs   Lab Test 11/01/21  1336 10/25/21  1507   URINEPH 5.5 5.5   NITRITE Negative Negative   LEUKEST Negative Negative   WBCU 0 2       Most Recent [facility] Pulmonary Function  Testing (FVC/FEV1 only)  No results found for: 20002  No results found for: 20003  No results found for: 20015  No results found for: 20016    IMAGING    Recent Results (from the past 48 hour(s))   XR Chest Port 1 View    Narrative    EXAMINATION:  XR CHEST PORT 1 VIEW 11/9/2021 1:15 AM.    COMPARISON: 11/8/2021.    HISTORY:  s/p lung transplant    FINDINGS: Surgical changes of bilateral lung transplant with clamshell  median sternotomy wires and overlying skin staples. Left PICC tip  projects over the innominate-SVC confluence, stable. Tracheostomy tube  tip projects over the mid trachea. Left pigtail catheter is stable in  position.     Cardiomediastinal silhouette is stable. Pulmonary vasculature is  indistinct. Unchanged small left and trace right pleural effusions. No  significant change in the mixed perihilar and bibasilar pulmonary  opacities. No pneumothorax. Partially visualized percutaneous GJ tube  in the upper abdomen. Osseous structures are unchanged.      Impression    IMPRESSION:   Stable postsurgical changes of bilateral lung transplant. Stable small  left and trace right pleural effusions.    I have personally reviewed the examination and initial interpretation  and I agree with the findings.    ROSS OLMSTEAD MD         SYSTEM ID:  [id number]   XR Abdomen Port 1 View    Narrative    EXAMINATION:  XR ABDOMEN PORT 1 VIEWS 11/9/2021 1:15 AM.    COMPARISON: 11/8/2021.    HISTORY:  review GJ placerment    FINDINGS: Portable supine view of the abdomen. Gastrojejunostomy tube  tip is stable with tip projecting over the proximal duodenum. Partly  visualized surgical changes of the chest with clamshell sternotomy  wires and left basilar chest tube. Nonobstructive bowel gas pattern.  Bibasilar atelectasis in the visualized lung bases.      Impression    IMPRESSION: Gastrojejunostomy tube tip continues to project over the  proximal duodenum, unchanged from prior exam. Nonobstructive bowel gas  pattern.    I  have personally reviewed the examination and initial interpretation  and I agree with the findings.    ROSS OLMSTEAD MD         SYSTEM ID:  [id number]   XR Fluoro Time 0/1 Hour    Narrative    This exam was marked as non-reportable because it will not be read by a   radiologist or a [facility] non-radiologist provider.         XR Chest Port 1 View    Narrative    EXAMINATION:  XR CHEST PORT 1 VIEW 11/10/2021 12:44 AM.    COMPARISON: 11/9/2021.    HISTORY:  s/p lung transplant    FINDINGS: Surgical changes of bilateral lung transplant with clamshell  median sternotomy wires and overlying skin staples. Left PICC tip  projects over the innominate-SVC confluence, stable. Tracheostomy tube  tip projects over the mid trachea. Left pigtail catheter is stable in  position.     Cardiomediastinal silhouette is stable. Pulmonary vasculature is  indistinct. Unchanged small bilateral pleural effusions. Increased  mixed perihilar and bibasilar pulmonary opacities. No pneumothorax.  Osseous structures are unchanged.      Impression    IMPRESSION:   Increased perihilar and bibasilar mixed pulmonary opacities, which may  represent atelectasis or developing infection. Stable small bilateral  pleural effusions.    I have personally reviewed the examination and initial interpretation  and I agree with the findings.    KRISTA SON MD         SYSTEM ID:  [id number]

## 2021-11-10 NOTE — CARE CONFERENCE
Care Conference    Care conference was held on November 9, 2021 at 2:30 in 4C conf. Room.     Attending the conference were: Team member-  CVICU ( Dr. Coffey and  ), CVTS ( Dr. Soni), Pulmonary ( Dr. Mckeon), Lung transplant RN coordinator (Mallory Nieves), Transplant SW( Leisa Navarro) and RNCC( Jennifer Pate).  Family members- pt dtr, so and father all on the speaker phone    Purpose of the conference was to provide update and discuss the plan of care    Discussion/Outcomes/Follow-Up: After introduction was done the team summarized pt hospital course and provided update about pt current medical status.  The team stated pt is making slow progress.  They are hopeful he will recover from his stroke but will need long rehab stay before he will be able to go home.  The team discussed the tentative LTAC need for vent weaning in the next 1-2 weeks, if he is not off the vent.  The team addressed all pt family questions and agreed to meet again next Tuesday at 2:00 for update.  RNCC will check with the new providers on Monday, 11/15 their availability for Tuesday, 11/16 at 2:00 care conf. and will call pt dtr to conform the time and provide conf. call in number.  RNCC will make LTAC referral to [facility].      Jennifer Pate, RN, PHN, BSN  4A and 4E/ ICU  Care Coordinator  Phone: [phone]  Pager: [phone]    To get in touch with weekend & Holiday on call RN Care Coordinator  Page [phone] or Care Coordinator Job code/pager- [phone]

## 2021-11-11 ENCOUNTER — APPOINTMENT (OUTPATIENT)
Dept: GENERAL RADIOLOGY | Facility: CLINIC | Age: 56
End: 2021-11-11
Attending: STUDENT IN AN ORGANIZED HEALTH CARE EDUCATION/TRAINING PROGRAM
Payer: COMMERCIAL

## 2021-11-11 ENCOUNTER — APPOINTMENT (OUTPATIENT)
Dept: PHYSICAL THERAPY | Facility: CLINIC | Age: 56
End: 2021-11-11
Attending: STUDENT IN AN ORGANIZED HEALTH CARE EDUCATION/TRAINING PROGRAM
Payer: COMMERCIAL

## 2021-11-11 LAB
ALBUMIN SERPL-MCNC: 1.9 G/DL (ref 3.4–5)
ALP SERPL-CCNC: 143 U/L (ref 40–150)
ALT SERPL W P-5'-P-CCNC: 39 U/L (ref 0–70)
ANION GAP SERPL CALCULATED.3IONS-SCNC: 6 MMOL/L (ref 3–14)
ANION GAP SERPL CALCULATED.3IONS-SCNC: 6 MMOL/L (ref 3–14)
ANION GAP SERPL CALCULATED.3IONS-SCNC: 9 MMOL/L (ref 3–14)
AST SERPL W P-5'-P-CCNC: 16 U/L (ref 0–45)
BASE EXCESS BLDA CALC-SCNC: 4.1 MMOL/L (ref -9–1.8)
BASE EXCESS BLDV CALC-SCNC: 1.8 MMOL/L (ref -7.7–1.9)
BASE EXCESS BLDV CALC-SCNC: 5.5 MMOL/L (ref -7.7–1.9)
BILIRUB SERPL-MCNC: 0.3 MG/DL (ref 0.2–1.3)
BUN SERPL-MCNC: 35 MG/DL (ref 7–30)
BUN SERPL-MCNC: 35 MG/DL (ref 7–30)
BUN SERPL-MCNC: 36 MG/DL (ref 7–30)
CALCIUM SERPL-MCNC: 7.5 MG/DL (ref 8.5–10.1)
CALCIUM SERPL-MCNC: 8.5 MG/DL (ref 8.5–10.1)
CALCIUM SERPL-MCNC: 8.5 MG/DL (ref 8.5–10.1)
CHLORIDE BLD-SCNC: 107 MMOL/L (ref 94–109)
CHLORIDE BLD-SCNC: 107 MMOL/L (ref 94–109)
CHLORIDE BLD-SCNC: 112 MMOL/L (ref 94–109)
CO2 SERPL-SCNC: 25 MMOL/L (ref 20–32)
CO2 SERPL-SCNC: 29 MMOL/L (ref 20–32)
CO2 SERPL-SCNC: 29 MMOL/L (ref 20–32)
CREAT SERPL-MCNC: 0.52 MG/DL (ref 0.66–1.25)
CREAT SERPL-MCNC: 0.56 MG/DL (ref 0.66–1.25)
CREAT SERPL-MCNC: 0.56 MG/DL (ref 0.66–1.25)
ERYTHROCYTE [DISTWIDTH] IN BLOOD BY AUTOMATED COUNT: 17.7 % (ref 10–15)
GFR SERPL CREATININE-BSD FRML MDRD: >90 ML/MIN/1.73M2
GLUCOSE BLD-MCNC: 176 MG/DL (ref 70–99)
GLUCOSE BLD-MCNC: 176 MG/DL (ref 70–99)
GLUCOSE BLD-MCNC: 180 MG/DL (ref 70–99)
GLUCOSE BLDC GLUCOMTR-MCNC: 100 MG/DL (ref 70–99)
GLUCOSE BLDC GLUCOMTR-MCNC: 119 MG/DL (ref 70–99)
GLUCOSE BLDC GLUCOMTR-MCNC: 120 MG/DL (ref 70–99)
GLUCOSE BLDC GLUCOMTR-MCNC: 127 MG/DL (ref 70–99)
GLUCOSE BLDC GLUCOMTR-MCNC: 140 MG/DL (ref 70–99)
GLUCOSE BLDC GLUCOMTR-MCNC: 146 MG/DL (ref 70–99)
GLUCOSE BLDC GLUCOMTR-MCNC: 151 MG/DL (ref 70–99)
GLUCOSE BLDC GLUCOMTR-MCNC: 151 MG/DL (ref 70–99)
GLUCOSE BLDC GLUCOMTR-MCNC: 152 MG/DL (ref 70–99)
GLUCOSE BLDC GLUCOMTR-MCNC: 157 MG/DL (ref 70–99)
GLUCOSE BLDC GLUCOMTR-MCNC: 175 MG/DL (ref 70–99)
GLUCOSE BLDC GLUCOMTR-MCNC: 184 MG/DL (ref 70–99)
GLUCOSE BLDC GLUCOMTR-MCNC: 83 MG/DL (ref 70–99)
HCO3 BLD-SCNC: 28 MMOL/L (ref 21–28)
HCO3 BLDV-SCNC: 26 MMOL/L (ref 21–28)
HCO3 BLDV-SCNC: 31 MMOL/L (ref 21–28)
HCT VFR BLD AUTO: 28.4 % (ref 40–53)
HGB BLD-MCNC: 8.9 G/DL (ref 13.3–17.7)
INR PPP: 1.12 (ref 0.85–1.15)
MAGNESIUM SERPL-MCNC: 1.9 MG/DL (ref 1.6–2.3)
MCH RBC QN AUTO: 30.4 PG (ref 26.5–33)
MCHC RBC AUTO-ENTMCNC: 31.3 G/DL (ref 31.5–36.5)
MCV RBC AUTO: 97 FL (ref 78–100)
O2/TOTAL GAS SETTING VFR VENT: 40 %
OXYHGB MFR BLDV: 62 % (ref 70–75)
PCO2 BLD: 37 MM HG (ref 35–45)
PCO2 BLDV: 40 MM HG (ref 40–50)
PCO2 BLDV: 46 MM HG (ref 40–50)
PH BLD: 7.48 [PH] (ref 7.35–7.45)
PH BLDV: 7.43 [PH] (ref 7.32–7.43)
PH BLDV: 7.43 [PH] (ref 7.32–7.43)
PHOSPHATE SERPL-MCNC: 3.5 MG/DL (ref 2.5–4.5)
PLATELET # BLD AUTO: 189 10E3/UL (ref 150–450)
PO2 BLD: 106 MM HG (ref 80–105)
PO2 BLDV: 31 MM HG (ref 25–47)
PO2 BLDV: 34 MM HG (ref 25–47)
POTASSIUM BLD-SCNC: 3.9 MMOL/L (ref 3.4–5.3)
POTASSIUM BLD-SCNC: 4 MMOL/L (ref 3.4–5.3)
POTASSIUM BLD-SCNC: 4 MMOL/L (ref 3.4–5.3)
PROT SERPL-MCNC: 4.9 G/DL (ref 6.8–8.8)
RBC # BLD AUTO: 2.93 10E6/UL (ref 4.4–5.9)
SODIUM SERPL-SCNC: 142 MMOL/L (ref 133–144)
SODIUM SERPL-SCNC: 142 MMOL/L (ref 133–144)
SODIUM SERPL-SCNC: 146 MMOL/L (ref 133–144)
TACROLIMUS BLD-MCNC: 10 UG/L (ref 5–15)
TME LAST DOSE: NORMAL H
TME LAST DOSE: NORMAL H
UFH PPP CHRO-ACNC: 0.26 IU/ML
WBC # BLD AUTO: 13.4 10E3/UL (ref 4–11)

## 2021-11-11 PROCEDURE — 250N000013 HC RX MED GY IP 250 OP 250 PS 637: Performed by: THORACIC SURGERY (CARDIOTHORACIC VASCULAR SURGERY)

## 2021-11-11 PROCEDURE — 84100 ASSAY OF PHOSPHORUS: CPT | Performed by: THORACIC SURGERY (CARDIOTHORACIC VASCULAR SURGERY)

## 2021-11-11 PROCEDURE — 250N000009 HC RX 250: Performed by: STUDENT IN AN ORGANIZED HEALTH CARE EDUCATION/TRAINING PROGRAM

## 2021-11-11 PROCEDURE — 250N000013 HC RX MED GY IP 250 OP 250 PS 637: Performed by: STUDENT IN AN ORGANIZED HEALTH CARE EDUCATION/TRAINING PROGRAM

## 2021-11-11 PROCEDURE — 85027 COMPLETE CBC AUTOMATED: CPT | Performed by: THORACIC SURGERY (CARDIOTHORACIC VASCULAR SURGERY)

## 2021-11-11 PROCEDURE — 94640 AIRWAY INHALATION TREATMENT: CPT | Mod: 76

## 2021-11-11 PROCEDURE — 250N000013 HC RX MED GY IP 250 OP 250 PS 637

## 2021-11-11 PROCEDURE — 71045 X-RAY EXAM CHEST 1 VIEW: CPT

## 2021-11-11 PROCEDURE — 250N000012 HC RX MED GY IP 250 OP 636 PS 637: Performed by: INTERNAL MEDICINE

## 2021-11-11 PROCEDURE — 94667 MNPJ CHEST WALL 1ST: CPT

## 2021-11-11 PROCEDURE — 83735 ASSAY OF MAGNESIUM: CPT | Performed by: THORACIC SURGERY (CARDIOTHORACIC VASCULAR SURGERY)

## 2021-11-11 PROCEDURE — 250N000013 HC RX MED GY IP 250 OP 250 PS 637: Performed by: NURSE PRACTITIONER

## 2021-11-11 PROCEDURE — 200N000002 HC R&B ICU UMMC

## 2021-11-11 PROCEDURE — 94003 VENT MGMT INPAT SUBQ DAY: CPT

## 2021-11-11 PROCEDURE — 97110 THERAPEUTIC EXERCISES: CPT | Mod: GP

## 2021-11-11 PROCEDURE — 250N000011 HC RX IP 250 OP 636: Performed by: THORACIC SURGERY (CARDIOTHORACIC VASCULAR SURGERY)

## 2021-11-11 PROCEDURE — 99207 PR NON-BILLABLE SERV PER CHARTING: CPT | Mod: 24 | Performed by: PHYSICIAN ASSISTANT

## 2021-11-11 PROCEDURE — 94668 MNPJ CHEST WALL SBSQ: CPT

## 2021-11-11 PROCEDURE — 999N000157 HC STATISTIC RCP TIME EA 10 MIN

## 2021-11-11 PROCEDURE — 250N000012 HC RX MED GY IP 250 OP 636 PS 637: Performed by: NURSE PRACTITIONER

## 2021-11-11 PROCEDURE — 36600 WITHDRAWAL OF ARTERIAL BLOOD: CPT

## 2021-11-11 PROCEDURE — 250N000009 HC RX 250

## 2021-11-11 PROCEDURE — 80048 BASIC METABOLIC PNL TOTAL CA: CPT

## 2021-11-11 PROCEDURE — 82803 BLOOD GASES ANY COMBINATION: CPT | Performed by: STUDENT IN AN ORGANIZED HEALTH CARE EDUCATION/TRAINING PROGRAM

## 2021-11-11 PROCEDURE — 97530 THERAPEUTIC ACTIVITIES: CPT | Mod: GP

## 2021-11-11 PROCEDURE — 250N000011 HC RX IP 250 OP 636

## 2021-11-11 PROCEDURE — 82805 BLOOD GASES W/O2 SATURATION: CPT

## 2021-11-11 PROCEDURE — 85520 HEPARIN ASSAY: CPT | Performed by: THORACIC SURGERY (CARDIOTHORACIC VASCULAR SURGERY)

## 2021-11-11 PROCEDURE — 94640 AIRWAY INHALATION TREATMENT: CPT

## 2021-11-11 PROCEDURE — 80197 ASSAY OF TACROLIMUS: CPT | Performed by: NURSE PRACTITIONER

## 2021-11-11 PROCEDURE — 250N000011 HC RX IP 250 OP 636: Performed by: STUDENT IN AN ORGANIZED HEALTH CARE EDUCATION/TRAINING PROGRAM

## 2021-11-11 PROCEDURE — 99233 SBSQ HOSP IP/OBS HIGH 50: CPT | Mod: 24 | Performed by: INTERNAL MEDICINE

## 2021-11-11 PROCEDURE — 250N000013 HC RX MED GY IP 250 OP 250 PS 637: Performed by: ANESTHESIOLOGY

## 2021-11-11 PROCEDURE — 71045 X-RAY EXAM CHEST 1 VIEW: CPT | Mod: 26 | Performed by: STUDENT IN AN ORGANIZED HEALTH CARE EDUCATION/TRAINING PROGRAM

## 2021-11-11 PROCEDURE — 250N000012 HC RX MED GY IP 250 OP 636 PS 637: Performed by: PHYSICIAN ASSISTANT

## 2021-11-11 PROCEDURE — 82803 BLOOD GASES ANY COMBINATION: CPT | Performed by: PHYSICIAN ASSISTANT

## 2021-11-11 PROCEDURE — 85610 PROTHROMBIN TIME: CPT | Performed by: THORACIC SURGERY (CARDIOTHORACIC VASCULAR SURGERY)

## 2021-11-11 RX ORDER — ALBUTEROL SULFATE 0.83 MG/ML
SOLUTION RESPIRATORY (INHALATION)
Status: COMPLETED
Start: 2021-11-11 | End: 2021-11-11

## 2021-11-11 RX ORDER — MAGNESIUM SULFATE HEPTAHYDRATE 40 MG/ML
2 INJECTION, SOLUTION INTRAVENOUS ONCE
Status: COMPLETED | OUTPATIENT
Start: 2021-11-11 | End: 2021-11-11

## 2021-11-11 RX ORDER — LEVALBUTEROL INHALATION SOLUTION 1.25 MG/3ML
1.25 SOLUTION RESPIRATORY (INHALATION) EVERY 4 HOURS PRN
Status: DISCONTINUED | OUTPATIENT
Start: 2021-11-11 | End: 2021-11-15

## 2021-11-11 RX ORDER — FUROSEMIDE 10 MG/ML
20 INJECTION INTRAMUSCULAR; INTRAVENOUS ONCE
Status: COMPLETED | OUTPATIENT
Start: 2021-11-11 | End: 2021-11-11

## 2021-11-11 RX ORDER — CEFTRIAXONE 2 G/1
2 INJECTION, POWDER, FOR SOLUTION INTRAMUSCULAR; INTRAVENOUS EVERY 24 HOURS
Status: DISCONTINUED | OUTPATIENT
Start: 2021-11-11 | End: 2021-11-12

## 2021-11-11 RX ORDER — QUETIAPINE FUMARATE 50 MG/1
50 TABLET, FILM COATED ORAL ONCE
Status: COMPLETED | OUTPATIENT
Start: 2021-11-11 | End: 2021-11-11

## 2021-11-11 RX ADMIN — LEVALBUTEROL HYDROCHLORIDE 1.25 MG: 1.25 SOLUTION RESPIRATORY (INHALATION) at 20:29

## 2021-11-11 RX ADMIN — Medication 1 PACKET: at 13:42

## 2021-11-11 RX ADMIN — HYDROXYZINE HYDROCHLORIDE 50 MG: 50 TABLET, FILM COATED ORAL at 09:53

## 2021-11-11 RX ADMIN — ACETYLCYSTEINE 2 ML: 200 SOLUTION ORAL; RESPIRATORY (INHALATION) at 16:08

## 2021-11-11 RX ADMIN — HUMAN INSULIN 2 UNITS/HR: 100 INJECTION, SOLUTION SUBCUTANEOUS at 17:45

## 2021-11-11 RX ADMIN — ACYCLOVIR 400 MG: 200 SUSPENSION ORAL at 19:30

## 2021-11-11 RX ADMIN — ACETYLCYSTEINE 2 ML: 200 SOLUTION ORAL; RESPIRATORY (INHALATION) at 20:29

## 2021-11-11 RX ADMIN — TACROLIMUS 2 MG: 5 CAPSULE ORAL at 07:38

## 2021-11-11 RX ADMIN — ACYCLOVIR 400 MG: 200 SUSPENSION ORAL at 07:36

## 2021-11-11 RX ADMIN — LEVALBUTEROL HYDROCHLORIDE 1.25 MG: 1.25 SOLUTION RESPIRATORY (INHALATION) at 13:29

## 2021-11-11 RX ADMIN — CALCIUM CARBONATE 600 MG (1,500 MG)-VITAMIN D3 400 UNIT TABLET 1 TABLET: at 18:36

## 2021-11-11 RX ADMIN — PREDNISOLONE 12.5 MG: 15 SOLUTION ORAL at 19:30

## 2021-11-11 RX ADMIN — LEVOTHYROXINE SODIUM 25 MCG: 0.03 TABLET ORAL at 07:34

## 2021-11-11 RX ADMIN — NYSTATIN 1000000 UNITS: 500000 SUSPENSION ORAL at 15:07

## 2021-11-11 RX ADMIN — ACETAMINOPHEN 975 MG: 325 TABLET, FILM COATED ORAL at 10:18

## 2021-11-11 RX ADMIN — CEFTRIAXONE SODIUM 2 G: 2 INJECTION, POWDER, FOR SOLUTION INTRAMUSCULAR; INTRAVENOUS at 13:37

## 2021-11-11 RX ADMIN — LIDOCAINE 2 PATCH: 560 PATCH PERCUTANEOUS; TOPICAL; TRANSDERMAL at 07:34

## 2021-11-11 RX ADMIN — INSULIN GLARGINE 30 UNITS: 100 INJECTION, SOLUTION SUBCUTANEOUS at 11:43

## 2021-11-11 RX ADMIN — Medication 1 PACKET: at 19:29

## 2021-11-11 RX ADMIN — HEPARIN SODIUM 800 UNITS/HR: 1000 INJECTION INTRAVENOUS; SUBCUTANEOUS at 13:40

## 2021-11-11 RX ADMIN — FUROSEMIDE 20 MG: 10 INJECTION, SOLUTION INTRAVENOUS at 09:53

## 2021-11-11 RX ADMIN — Medication 10 MG: at 19:29

## 2021-11-11 RX ADMIN — CALCIUM CARBONATE 600 MG (1,500 MG)-VITAMIN D3 400 UNIT TABLET 1 TABLET: at 07:34

## 2021-11-11 RX ADMIN — ALBUTEROL SULFATE 2.5 MG: 2.5 SOLUTION RESPIRATORY (INHALATION) at 04:59

## 2021-11-11 RX ADMIN — ALBUTEROL SULFATE: 2.5 SOLUTION RESPIRATORY (INHALATION) at 22:59

## 2021-11-11 RX ADMIN — HYDROXYZINE HYDROCHLORIDE 50 MG: 50 TABLET, FILM COATED ORAL at 22:05

## 2021-11-11 RX ADMIN — NYSTATIN 1000000 UNITS: 500000 SUSPENSION ORAL at 11:43

## 2021-11-11 RX ADMIN — QUETIAPINE FUMARATE 50 MG: 50 TABLET ORAL at 22:11

## 2021-11-11 RX ADMIN — SULFAMETHOXAZOLE AND TRIMETHOPRIM 1 TABLET: 400; 80 TABLET ORAL at 07:34

## 2021-11-11 RX ADMIN — HYDROXYZINE HYDROCHLORIDE 50 MG: 50 TABLET, FILM COATED ORAL at 03:58

## 2021-11-11 RX ADMIN — METOPROLOL TARTRATE 75 MG: 25 TABLET, FILM COATED ORAL at 07:34

## 2021-11-11 RX ADMIN — METOPROLOL TARTRATE 75 MG: 25 TABLET, FILM COATED ORAL at 19:28

## 2021-11-11 RX ADMIN — ACETYLCYSTEINE 2 ML: 200 SOLUTION ORAL; RESPIRATORY (INHALATION) at 09:37

## 2021-11-11 RX ADMIN — POTASSIUM CHLORIDE 40 MEQ: 40 SOLUTION ORAL at 07:34

## 2021-11-11 RX ADMIN — ESCITALOPRAM 5 MG: 5 TABLET, FILM COATED ORAL at 07:38

## 2021-11-11 RX ADMIN — MULTIVIT AND MINERALS-FERROUS GLUCONATE 9 MG IRON/15 ML ORAL LIQUID 15 ML: at 07:33

## 2021-11-11 RX ADMIN — NYSTATIN 1000000 UNITS: 500000 SUSPENSION ORAL at 07:33

## 2021-11-11 RX ADMIN — MYCOPHENOLATE MOFETIL 1000 MG: 200 POWDER, FOR SUSPENSION ORAL at 07:35

## 2021-11-11 RX ADMIN — Medication 1 PACKET: at 07:36

## 2021-11-11 RX ADMIN — LEVALBUTEROL HYDROCHLORIDE 1.25 MG: 1.25 SOLUTION RESPIRATORY (INHALATION) at 16:08

## 2021-11-11 RX ADMIN — ACETYLCYSTEINE 2 ML: 200 SOLUTION ORAL; RESPIRATORY (INHALATION) at 13:28

## 2021-11-11 RX ADMIN — AMIODARONE HYDROCHLORIDE 200 MG: 200 TABLET ORAL at 07:34

## 2021-11-11 RX ADMIN — Medication 40 MG: at 19:30

## 2021-11-11 RX ADMIN — MYCOPHENOLATE MOFETIL 1000 MG: 200 POWDER, FOR SUSPENSION ORAL at 19:30

## 2021-11-11 RX ADMIN — TACROLIMUS 2 MG: 5 CAPSULE ORAL at 18:37

## 2021-11-11 RX ADMIN — Medication 40 MG: at 07:38

## 2021-11-11 RX ADMIN — ROSUVASTATIN CALCIUM 10 MG: 10 TABLET, FILM COATED ORAL at 07:33

## 2021-11-11 RX ADMIN — FLUCONAZOLE IN SODIUM CHLORIDE 400 MG: 2 INJECTION, SOLUTION INTRAVENOUS at 11:41

## 2021-11-11 RX ADMIN — Medication 1 PACKET: at 07:35

## 2021-11-11 RX ADMIN — LEVALBUTEROL HYDROCHLORIDE 1.25 MG: 1.25 SOLUTION RESPIRATORY (INHALATION) at 09:37

## 2021-11-11 RX ADMIN — NYSTATIN 1000000 UNITS: 500000 SUSPENSION ORAL at 19:29

## 2021-11-11 RX ADMIN — PREDNISOLONE 12.5 MG: 15 SOLUTION ORAL at 07:36

## 2021-11-11 RX ADMIN — MAGNESIUM SULFATE IN WATER 2 G: 40 INJECTION, SOLUTION INTRAVENOUS at 04:25

## 2021-11-11 ASSESSMENT — ACTIVITIES OF DAILY LIVING (ADL)
ADLS_ACUITY_SCORE: 27
ADLS_ACUITY_SCORE: 25
ADLS_ACUITY_SCORE: 25
ADLS_ACUITY_SCORE: 27
ADLS_ACUITY_SCORE: 25
ADLS_ACUITY_SCORE: 25
ADLS_ACUITY_SCORE: 27
ADLS_ACUITY_SCORE: 25
ADLS_ACUITY_SCORE: 27
ADLS_ACUITY_SCORE: 25
ADLS_ACUITY_SCORE: 27
ADLS_ACUITY_SCORE: 25

## 2021-11-11 ASSESSMENT — MIFFLIN-ST. JEOR: SCORE: 1396

## 2021-11-11 NOTE — PROGRESS NOTES
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 Corral. 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  UGIB causing code blue on 11/2.     OVERNIGHT EVENTS:  naeo     TODAY'S PROGRESS:   Trach dome  Bronch tomorrow  Increase lantus  Ask cardiac surgery about starting warfarin        PLAN:  Neuro/ pain/ sedation:  Acute postoperative pain   Anxiety and depression  Acute to subacute CVA, b/l cerebral hemispheres and L cerebellum, suspect embolic  Encephalopathy and diffuse weakness - improving slightly   R ear lateral canal floor excoriation with bleeding - resolved   - APAP and oxycodone prn, lido patch   - PTA lexapro  - appreciate ENT  - appreciate neuro    Pulmonary care:   SALTY on home CPAP  Acute hypoxic respiratory failure s/p b/l lung transplant 10/16/21  S/p tracheostomy 10/29  Empyema with Candida s/p chest tube 11/3/21  - mechanically ventilated via trach, PST, trach dome  - Levalbuterol nebs and Mucomyst, chest PT  - Daily CXR  - appreciate Pulmonary  - Diurese    Cardiovascular:    HTN  Afib   PFO  Vasoplegic shock in setting of hypovolemia - resolved  - Monitor hemodynamic status.   - MAP goal <100, SBP <140  - metoprolol 75 BID    - prn labetalol  - statin  - Amiodarone 200 mg daily (end date 11/27/21)  - low-intensity heparin gtt     GI care/ Nutrition:   Elevated LFTs - recurrent  GI bleed 2/2 NG trauma, now recurrent secondary to GJ bumper ulcer   Moderate malnutrition in the context of acute on chronic illness  PEG-J, with malpositioned J tube   - Diet: tf    - PPI bid    Renal/ Fluid Balance/ Electrolytes:   Acute kidney injury, recurrent, now with azotemia - resolving    Hypernatremia  Hyperkalemia, resolved   BL creat appears to be ~ 0.7  - free water flushes  - Strict I/O, daily weights  - Avoid/limit nephrotoxins as able  - Replete lytes PRN per protocol     Endocrine:    Stress induced hyperglycemia with steroid-induced component, on DM2  Hypothyroidism  RA  Preop A1c 6.6  - insulin gtt   - Goal BG <180 for optimal healing  - continue home synthroid  - lantus 30     ID/ Antibiotics:  Immunosuppression s/p transplant  Candidal infection of donor lungs (likely source), pleural fluid, and fungemia   - NGTD CSF. Last + blood cx 10/22. No vegetations on valves per TC 10/23  - Nystatin, Acyclovir, bactrim   - Tacrolimus (via g tube), prednisone    - appreciate transplant ID, ophthalmology    - d/w ID, no indication to treat Staph epi in sputum   - Fluconazole (end date 11/25)    Heme:     Acute blood loss anemia secondary to surgery and GI bleed, and anemia related to critical illness and iron deficiency    Hypogammaglobulinemia s/p IVIG  Thrombocytopenia, HIT negative - resolved   Lactic acidosis, resolved  Nonocclusive R subclavian thrombus    - heparin low intensity     MSK/ Skin:  Clamshell surgical incision  - Sternal precautions  - Postoperative incision management per protocol  - PT/OT/CR     Prophylaxis:    - Mechanical prophylaxis for DVT: SCDs  - Chemical DVT prophylaxis: heparin low intensity gtt    - PPI      Lines/ tubes/ drains:  - trach  - peg-j  - Watson 10/25  - PIVs  - chest tube L  - L PICC     Disposition:  - CVICU      Patient seen, findings and plan discussed with CVICU staff.     Moises Ribera  PGY-3 Anesthesiology      ====================================    SUBJECTIVE:   naeo    OBJECTIVE:   1. VITAL SIGNS:   Temp:  [96.4  F (35.8  C)-98.7  F (37.1  C)] 98.2  F (36.8  C)  Pulse:  [66-89] 80  Resp:  [19-27] 19  BP: (101-143)/(58-87) 130/73  FiO2 (%):  [40 %] 40 %  SpO2:  [95 %-100 %] 97 %  Ventilation Mode: CMV/AC  (Continuous Mandatory Ventilation/ Assist  Control)  FiO2 (%): 40 %  Rate Set (breaths/minute): 12 breaths/min  Tidal Volume Set (mL): 400 mL  PEEP (cm H2O): 5 cmH2O  Pressure Support (cm H2O): 7 cmH2O  Oxygen Concentration (%): 40 %  Resp: 19    2. INTAKE/ OUTPUT:   I/O last 3 completed shifts:  In: 2357 [I.V.:542; NG/GT:780]  Out: 2430 [Urine:2165; Emesis/NG output:175; Chest Tube:90]    3. PHYSICAL EXAMINATION:   General: NAD  Neuro: opens eyes to voice, follows commands  Resp: vented via trach, nonlabored   CV: RRR on tele   Abdomen: soft, nontender, rounded   Incisions: c/d/i  Extremities: warm and well perfused, no edema    4. INVESTIGATIONS:   Arterial Blood Gases   Recent Labs   Lab 11/06/21  0438 11/05/21  0504 11/05/21  0336 11/04/21  1016   PH 7.43 7.49* 7.49* 7.46*   PCO2 37 31* 32* 35   PO2 109* 126* 331* 95   HCO3 25 24 24 25     Complete Blood Count   Recent Labs   Lab 11/11/21  0322 11/10/21  0329 11/09/21  0351 11/08/21  0344   WBC 13.4* 21.6* 14.4* 16.5*   HGB 8.9* 10.2* 9.3* 9.0*    273 200 182     Basic Metabolic Panel  Recent Labs   Lab 11/11/21  0550 11/11/21  0332 11/11/21  0322 11/11/21  0114 11/10/21  1719 11/10/21  1641 11/10/21  0406 11/10/21  0325 11/09/21  1527 11/09/21  1525   NA  --   --  142  142  --   --  142  --  142  142  --  144   POTASSIUM  --   --  4.0  4.0  --   --  4.2  --  4.1  4.1  --  4.4   CHLORIDE  --   --  107  107  --   --  110*  --  109  109  --  110*   CO2  --   --  29  29  --   --  26  --  27  27  --  28   BUN  --   --  35*  35*  --   --  35*  --  32*  32*  --  25   CR  --   --  0.56*  0.56*  --   --  0.64*  --  0.64*  0.64*  --  0.65*   * 157* 176*  176* 152*   < > 149*  152*   < > 150*  150*   < > 128*    < > = values in this interval not displayed.     Liver Function Tests  Recent Labs   Lab 11/11/21  0322 11/10/21  0325 11/09/21  0351 11/08/21  0344   AST 16 20 17 <3   ALT 39 52 60 78*   ALKPHOS 143 167* 132 134   BILITOTAL 0.3 0.4 0.5 0.2   ALBUMIN 1.9* 2.0* 1.8* 1.7*   INR  1.12 1.12 1.16* 1.18*     Pancreatic Enzymes  No lab results found in last 7 days.  Coagulation Profile  Recent Labs   Lab 11/11/21  0322 11/10/21  0325 11/09/21  0351 11/08/21  0344   INR 1.12 1.12 1.16* 1.18*        =========================================

## 2021-11-11 NOTE — PLAN OF CARE
Major Shift Events:  Pt follows commands, pt restless most of night, denies pain, nods head yes to feeling anxious, atarax 50mg given x2, pt up most of night (maybe start seroquel tonight?). Heparin gtt therapeutic. Mag replaced. Pt's work of breathing increased at 0400, Bag suctioned and pt given Neb (MD paged), pt improved.  Plan: Continue to monitor and notify MD of questions or concerns.  For vital signs and complete assessments, please see documentation flowsheets.

## 2021-11-11 NOTE — PLAN OF CARE
Major Shift Events:  Pt follows commands and moves all extremities. No prns need for BP. TD for three hours this AM at 40%. Lasix given x2 with good response. Electrolytes checked and replaced. Heparin continues at 800 units/hr. Insulin gtt at 3 units/hr.     Plan: Continue pulmonary rehab.    For vital signs and complete assessments, please see documentation flowsheets.

## 2021-11-11 NOTE — PLAN OF CARE
Major Shift Events: Unable to pressure support or trach mask in AM due to patient complains of shortness of breath and is having increased work of breathing. Atarax given for anxiety. Lasix given. Able to attempt pressure support x1 in afternoon but not trach mask. Up to chair x2.   Plan: Attempt to pressure supporting and track mask tomorrow  For vital signs and complete assessments, please see documentation flowsheets.

## 2021-11-11 NOTE — PROGRESS NOTES
Pulmonary Medicine  Cystic Fibrosis - Lung Transplant Team  Progress Note  2021       Patient: Edson Thornton  MRN: [id number]  : 1965 (age 56 year old)  Transplant: 10/16/2021 (Lung), POD#26  Admission date: 2021    Assessment & Plan:     Edson Thornton is a 56 year old male with a PMH significant for NSIP/ILD, bronchiectasis, moderate PH, RA, SALTY, chronic HSV infection, hypogammaglobulinemia, steroid-induced diabetes, hypothyroidism, PFO, HTN, HLD, duodenal anomaly, anxiety, and depression.  Admitted on 21 from OSH for acute on chronic respiratory failure 2/2 ILD exacerbation, now s/p BSLT on 10/16/21.  Prolonged vent wean s/p trach and PEG/J tube placement with thoracic surgery 10/29.  Post-op course otherwise complicated by encephalopathy and diffuse weakness, acute to subacute CVA, afib with RVR, BRENNAN, GI bleed, Candidemia/Candida empyema, and recurrent fevers.  Code called  for bradycardia/asystole and progressively hypotensive, found to have acute drop in hemoglobin and bloody G tube output.  Gradual improvement in mentation and weakness.     Today's recommendations:   - PS and TD trials during the day per ICU team, otherwise CMV settings  - VBG ordered  - Diuresis per ICU team  - CXR daily with left chest tube in place  - Plan for inspection bronch tomorrow at 1300  - Tacrolimus level therapeutic, no dose adjustment, repeat level ordered   - Prednisolone taper due  (not yet ordered)  - CMV and EBV ordered   - Continue acyclovir through 11/15  - Follow pending blood cultures (11/10)   - Sputum culture (11/10) with Klebsiella pneumoniae, agree with ceftriaxone  - Coccidioides Ag ordered   - Continue fluconazole through , EKG weekly (, ordered) for QTc monitoring  - IgG ordered   - DSA ordered 11/15  - PHS high risk donor risk labs ordered 11/15     S/p BSLT for ILD:  Acute hypoxic respiratory failure s/p trach:   Pulmonary edema:  Bilateral  pleural effusions: Unfortunately had not received vaccination for flu, PNA, or COVID-19 PTA.  Explant pathology with NSIP, no malignancy.  PGD 2-3.  Weaned off paralytic 10/19 (for vent dyssynchrony) and Caroline 10/22.  Initial difficulty weaning sedation given agitation then with neurological findings as below.  CXR initially post-op with pulmonary edema, aggressively diuresed.  Recurring fevers post-op, see ID section below.  Prolonged vent wean s/p trach and PEG/J tube placement with thoracic surgery 10/29.  Most recent bronch 10/30 with thick secretions in the RUL and LLL and mild mucosal erythema, bilateral anastomoses intact.  Code called 11/2 for bradycardia/asystole (required 1 round of CPR, no medications) then progressively hypotensive, GI bleed as below.  Chest CT 11/2 with increased bilateral pleural effusions (moderate left, small right), bibasilar atelectasis with area of hypoenhancing parenchyma in LLL (suspicious for infection), and numerous nondisplaced anterior rib fractures bilaterally.  S/p left chest tube placement in IR 11/3, right deferred given very little effusion on US.  Intermittently tolerating PS trials.  - Nebs: levalbuterol and Mucomyst QID  - Aggressive pulmonary toilet with chest physiotherapy QID  - PS and TD trials during the day per ICU team, otherwise CMV settings  - VBG (ordered)  - Diuresis per ICU team  - CXR daily with left chest tube in place, today with continued perihilar and bibasilar mixed pulmonary opacities and stable small bilateral pleural effusions (personally reviewed with Dr. Mckeon)  - Plan for inspection bronch 11/12 at 1300     Immunosuppression: s/p induction therapy with basiliximab 10/16 (and high dose IV steroid) and 10/20  - Tacrolimus 2 mg BID (increased 11/8, via G tube).  Goal level 8-12.  Level 11/11 therapeutic at 10, no dose adjustment, repeat level 11/14 (ordered).  - MMF 1000 mg BID (11/2, decreased given GI bleed, AZA to be avoided given  TPMT)  - Prednisolone 12.5 mg BID, next taper due 11/21 (not yet ordered)  Date AM dose (mg) PM dose (mg)   11/7/21 12.5 12.5   11/21/21 12.5 10   12/5/21 10 10   1/2/22 10 7.5   1/30/22 7.5 7.5   2/27/22 7.5 5   3/27/22 5 5   4/24/22 5 2.5      Prophylaxis:   - Bactrim for PJP ppx (held 11/2-11/5 d/t BRENNAN)  - Nystatin for oral candidiasis ppx, 6 month course  - See below for serologies and viral ppx:    Donor Recipient Intervention   CMV status Negative Negative None, CMV monthly (ordered 11/16)   EBV status Positive Positive None, EBV monthly (ordered 11/16)   HSV status N/A Positive Acyclovir POD #1-30 through 11/15 (ordered)  (recent infection history pre-txp)      ID: Concern for possible Strongyloides exposure pre-transplant s/p ivermectin x1 dose (9/17).  Donor and recipient cultures NGTD.  S/p IV ceftazidime/vancomycin for 48h per protocol and additional empiric ceftazidime 10/19-10/23 given recurrent fevers.  Bronch cultures 10/17 with Staph epi.  Cryptococcal Ag negative 10/23.  Worsening leukocytosis 11/10.  - Blood cultures (11/10) NGTD  - Bacterial sputum culture (11/10) with Klebsiella pneumoniae, agree with ceftriaxone  - Monthly Coccidioides Ag x12 months post-transplant per ID (urine and serum negative 10/17), due 11/17 (ordered)     Recurring fevers, Resolved:  Fevers post-op, Tmax 101.7 POD #1.  Febrile with worsening leukocytosis again 10/25, generally persisting.  Trach sputum culture (10/25) with GPC, normal jean marie.  LP (10/29), xanthochromic with pleocytosis thought to be appropriate given RBC and WBCs, no ABX recommended per transplant ID and neurology.  Bronch culture (10/30) with GPC in clusters, normal jean marie.  S/p empiric meropenem 10/28-11/2.  Trach sputum culture (11/2) with Staph epi, treatment not indicated per transplant ID.  Now afebrile.     Disseminated Candida: Noted on blood cultures 10/20 and 10/22.  BDG fungitell positive (399) on 10/20.  Respiratory cultures with persistent  Candida albicans.  TC 10/23 without evidence of endocarditis.  Ophthalmology consult 10/24 with benign dilated fundoscopic exam.  Candida empyema also noted 10/25, chest tubes inadvertently removed by CVTS 10/28, left chest tube replaced by IR 11/3 as above with ongoing Candida on cultures.  Most recent blood culture 11/2 negative.  - Fluconazole (10/26-11/25 per transplant ID, prior micafungin 10/22-10/27), repeat EKG for QTc monitoring 11/16 (ordered), LFTs as below (see transplant ID note 11/5, will need repeat imaging to ensure left pleural effusion almost entirely resolved before removing chest tube and concluding fluconazole)      HSV: Chronic intermittent active infection pre-transplant with recent HSV infection: crusted lesions throughout left side of jaw, s/p 10 day treatment course of ACV through 10/9.  HSV PCR blood negative 10/17.  - ACV ppx as above (started POD #1 instead of POD #8 given HSV history and location)     Hypogammaglobulinemia: IgG previously low at 364 (9/7).  Noted at 265 at time of transplant, s/p IVIG with premedication 10/21.    - Repeat IgG 11/17 (ordered)     Positive cross match: Note that he received two doses of rituximab in June, which is likely contributing to cross match result.  DSA most recently negative 11/1.  - DSA monitoring q2 weeks (11/15, ordered)     PHS risk criteria donor:  Additional labs required post-transplant (between 4-8 weeks post-op): Hepatitis B, Hepatitis C, and HIV by VISHAL (ordered 11/15).     SALTY: Noted pre-transplant.  Home CPAP 6-12 cm H2O.  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  MRI 10/23 with multifocal subacute infarcts within both  cerebral hemispheres and left cerebellum.  DDx include surgery v embolic v infectious.  Heparin drip started 10/23 (intermittently held with GI bleed as below).  Repeat stroke code 10/25 d/t marked decrease in responsiveness with sedation wean, pupil inequality, and absent gag reflex.  CTA head without obvious new pathology, MRI brain (with and without contrast) primarily revealing for infarct, low likelihood of PRES.  Ammonia normal.  VEEG per neuro with severe diffuse encephalopathy.  Gradual improvement noted since 10/29, repeat head CT 11/2 (following code) without new acute intracranial abnormalities.  - AC management per primary team as below     Afib with RVR: Noted 10/18, started on amiodarone drip and transitioned to PO 10/21, dose decreased 10/29 (anticipate 4 week course).  Currently in SR.  Heparin drip as above.  Metoprolol resumed 11/3.     Right subclavian DVT: Seen on UE US from 11/4.  Heparin drip resumed 11/5.        Recurrent GI bleed: Hemoglobin dropped to 6.6 10/22, s/p 2 units pRBC.  OG tube with bloody output, EGD 10/23 noted NJ/OG tube trauma with scant oozing.  Progressive hypotension 11/2, hemoglobin 5.8 with bloody G tube output.  Heparin drip held, transitioned to PPI drip, and MTP activated.  EGD 11/2 with large amount of clotted blood in stomach and area of raised mucosa with small adherent clot near PEG tube site that was clipped, no active bleeding.  Abdominal CT 11/2 with stomach distended with blood products and dilated small bowel.  Maroon stools 11/3, repeat EGD with extensive old blood in stomach, no active bleeding, small nodular area with prior clips clipped again.  Most recent EGD 11/5 with ulcer noted at PEG tube bumper site, gastritis, and suction marks from G tube, and GJ arm is in the duodenal bulb.  Large amount of TF noted in G tube drainage 11/7.  AXR 11/9 with GJ tube tip projecting over proximal duodenum.  GI exchanged GJ tube 11/9.  - Management (including AC) per GI  and primary team     BRENNAN, Improving:   Hyperkalemia, Resolved: Baseline creatinine 0.7.  BRENNAN noted POD #1, UO also downtrending.  Improved with aggressive diuresis, Cr worsened again (1.38 on 11/2) along with up trending BUN and hyperkalemia.  Nephrology consulted 11/1, thought to be prerenal/component of ATN.  Now improving.     Elevated LFTs, Resolved: Shock liver post-op.  Initial ALT//230 evening of surgery, then with marked increase to 1550/1246 POD #1.  Had improved although now with acute rise again likely in setting of shock.  Now improved 11/11.    We appreciate the excellent care provided by the CVICU and CVTS teams.  Recommendations communicated via in person rounding and this note.  Will continue to follow along closely, please do not hesitate to call with any questions or concerns.    Patient seen and discussed with Dr. Mckeon.    Keyla Rice PA-C  Inpatient PRINCESS  Pulmonary CF/Transplant     Subjective & Interval History:     Fair sleep, restless and anxious at times.  Predominantly on CMV 12/400/5/40.  TD for 2h once yesterday, limited due to dyspnea.  Also with increased WOB early this morning, improved with suctioning and albuterol neb.  Received chest physiotherapy QID yesterday.  Left chest tube with decreasing output.  Net even with diuresis yesterday although weight down trending.  TF at goal, having BMs, intermittent nausea.    Review of Systems:     ROS limited due to communication barriers    C: No fever  INTEGUMENTARY/SKIN: No rash or obvious new lesions  ENT/MOUTH: No nasal drainage  RESP: See interval history  CV: No chest pain, no peripheral edema  GI: No vomiting, no reflux symptoms  : + Watson  MUSCULOSKELETAL: No myalgias, no arthralgias  ENDOCRINE: Blood sugars with adequate control  NEURO: No headache  PSYCHIATRIC: See interval history    Physical Exam:     Vital signs:  Temp: 98.2  F (36.8  C) Temp src: Axillary BP: 130/73 Pulse: 80   Resp: 19 SpO2: 97 % O2 Device:  "Mechanical Ventilator Oxygen Delivery: 45 LPM Height: 162.6 cm (5' 4\") Weight: 65.5 kg (144 lb 6.4 oz)  I/O:     Intake/Output Summary (Last 24 hours) at 11/11/2021 0839  Last data filed at 11/11/2021 0800  Gross per 24 hour   Intake 2171 ml   Output 2465 ml   Net -294 ml     Constitutional: Sitting up in chair, in no apparent distress.   HEENT: Eyes with pink conjunctivae, anicteric.  Oral mucosa moist without lesions.  Trach cdi.  PULM: Mildly diminished air flow to bases bilaterally.  Scattered coarse crackles.  No rhonchi, no wheezes.  Mildly labored breathing at times on full vent support.  CV: Normal S1 and S2.  RRR.  No murmur, gallop, or rub.  No peripheral edema.   ABD: NABS, soft, epigastric tenderness with palpation, nondistended.    MSK: Able to move left > right UE and wiggle BLE toes.  No apparent muscle wasting.   NEURO: Alert, following commands, answering some yes/no questions by nodding/shaking head.  SKIN: Warm, dry.  No rash on limited exam.   PSYCH: Mildly anxious.     Lines, Drains, and Devices:  Peripheral IV 11/02/21 Anterior;Right Upper forearm (Active)   Site Assessment [facility] 11/11/21 0400   Line Status Infusing;Checked every 1-2 hour 11/11/21 0400   Dressing Intervention Dressing reinforced 11/08/21 2000   Phlebitis Scale 0-->no symptoms 11/11/21 0400   Infiltration Scale 0 11/11/21 0400   Infiltration Site Treatment Method  None 11/10/21 0400   Number of days: 9       PICC Triple Lumen 11/04/21 Left Basilic Access. PICC okay to use. (Active)   Site Assessment WDL 11/11/21 0400   External Cath Length (cm) 1 cm 11/04/21 1747   Extremity Circumference (cm) 28 cm 11/04/21 1747   Dressing Intervention Chlorhexidine patch;Transparent 11/11/21 0400   Dressing Change Due 11/14/21 11/11/21 0400   Gray - Status infusing 11/11/21 0400   Gray - Cap Change Due 11/12/21 11/11/21 0400   Red - Status saline locked 11/11/21 0400   Red - Cap Change Due 11/12/21 11/11/21 0400   White - Status infusing " 11/11/21 0400   White - Cap Change Due 11/12/21 11/11/21 0400   Extravasation? No 11/10/21 2000   Line Necessity Yes, meets criteria 11/11/21 0400   Number of days: 7     Data:     LABS    CMP:   Recent Labs   Lab 11/11/21  0752 11/11/21  0550 11/11/21  0332 11/11/21  0322 11/10/21  1719 11/10/21  1641 11/10/21  0406 11/10/21  0325 11/09/21  1527 11/09/21  1525 11/09/21  0539 11/09/21  0351 11/08/21  0346 11/08/21  0344   NA  --   --   --  142  142  --  142  --  142  142  --  144  --  143  143   < > 146*  146*   POTASSIUM  --   --   --  4.0  4.0  --  4.2  --  4.1  4.1  --  4.4  --  4.3  4.3   < > 3.9  3.9   CHLORIDE  --   --   --  107  107  --  110*  --  109  109  --  110*  --  112*  112*   < > 115*  115*   CO2  --   --   --  29  29  --  26  --  27  27  --  28  --  29  29   < > 28  28   ANIONGAP  --   --   --  6  6  --  6  --  6  6  --  6  --  2*  2*   < > 3  3   * 120* 157* 176*  176*   < > 149*  152*   < > 150*  150*   < > 128*   < > 125*  125*  127*   < > 152*  152*   BUN  --   --   --  35*  35*  --  35*  --  32*  32*  --  25  --  25  25   < > 31*  31*   CR  --   --   --  0.56*  0.56*  --  0.64*  --  0.64*  0.64*  --  0.65*  --  0.58*  0.58*   < > 0.56*  0.56*   GFRESTIMATED  --   --   --  >90  >90  --  >90  --  >90  >90  --  >90  --  >90  >90   < > >90  >90   ERIK  --   --   --  8.5  8.5  --  8.5  --  8.8  8.8  --  8.7  --  8.6  8.6   < > 8.6  8.6   MAG  --   --   --  1.9  --  1.8  --  1.7  --  2.1  --  1.8   < > 1.8   PHOS  --   --   --  3.5  --  3.6  --  3.4  --   --   --  3.6  --  2.8   PROTTOTAL  --   --   --  4.9*  --   --   --  5.3*  --   --   --  5.0*  --  4.6*   ALBUMIN  --   --   --  1.9*  --   --   --  2.0*  --   --   --  1.8*  --  1.7*   BILITOTAL  --   --   --  0.3  --   --   --  0.4  --   --   --  0.5  --  0.2   ALKPHOS  --   --   --  143  --   --   --  167*  --   --   --  132  --  134   AST  --   --   --  16  --   --   --  20  --   --   --  17  --  <3    ALT  --   --   --  39  --   --   --  52  --   --   --  60  --  78*    < > = values in this interval not displayed.     CBC:   Recent Labs   Lab 11/11/21  0322 11/10/21  0329 11/09/21  0351 11/08/21  0344   WBC 13.4* 21.6* 14.4* 16.5*   RBC 2.93* 3.40* 3.08* 2.94*   HGB 8.9* 10.2* 9.3* 9.0*   HCT 28.4* 32.7* 30.0* 28.4*   MCV 97 96 97 97   MCH 30.4 30.0 30.2 30.6   MCHC 31.3* 31.2* 31.0* 31.7   RDW 17.7* 18.2* 18.2* 18.2*    273 200 182       INR:   Recent Labs   Lab 11/11/21  0322 11/10/21  0325 11/09/21  0351 11/08/21  0344   INR 1.12 1.12 1.16* 1.18*       Glucose:   Recent Labs   Lab 11/11/21  0752 11/11/21  0550 11/11/21  0332 11/11/21  0322 11/11/21  0114 11/10/21  2329   * 120* 157* 176*  176* 152* 103*       Blood Gas:   Recent Labs   Lab 11/07/21  0626 11/06/21  0438 11/05/21  0504   PHV 7.45*  --   --    PCO2V 40  --   --    PO2V 38  --   --    HCO3V 28  --   --    LORI 3.3*  --   --    O2PER 40 40 40       Culture Data No results for input(s): CULT in the last 168 hours.    Virology Data:   Lab Results   Component Value Date    FLUAH1 Negative 10/17/2021    FLUAH3 Negative 10/17/2021    GU2776 Negative 10/17/2021    IFLUB Negative 10/17/2021    RSVA Negative 10/17/2021    RSVB Negative 10/17/2021    PIV1 Negative 10/17/2021    PIV2 Negative 10/17/2021    PIV3 Negative 10/17/2021    HMPV Negative 10/17/2021    HRVS Negative 10/17/2021    ADVBE Negative 10/17/2021    ADVC Negative 10/17/2021       Historical CMV results (last 3 of prior testing):  Lab Results   Component Value Date    CMVQNT Not Detected 10/26/2021     No results found for: CMVLOG    Urine Studies    Recent Labs   Lab Test 11/01/21  1336 10/25/21  1507   URINEPH 5.5 5.5   NITRITE Negative Negative   LEUKEST Negative Negative   WBCU 0 2       Most Recent [place] Pulmonary Function Testing (FVC/FEV1 only)  No results found for: 20002  No results found for: 20003  No results found for: 20015  No results found for:  20016    IMAGING    Recent Results (from the past 48 hour(s))   XR Fluoro Time 0/1 Hour    Narrative    This exam was marked as non-reportable because it will not be read by a   radiologist or a [place] non-radiologist provider.         XR Chest Port 1 View    Narrative    EXAMINATION:  XR CHEST PORT 1 VIEW 11/10/2021 12:44 AM.    COMPARISON: 11/9/2021.    HISTORY:  s/p lung transplant    FINDINGS: Surgical changes of bilateral lung transplant with clamshell  median sternotomy wires and overlying skin staples. Left PICC tip  projects over the innominate-SVC confluence, stable. Tracheostomy tube  tip projects over the mid trachea. Left pigtail catheter is stable in  position.     Cardiomediastinal silhouette is stable. Pulmonary vasculature is  indistinct. Unchanged small bilateral pleural effusions. Increased  mixed perihilar and bibasilar pulmonary opacities. No pneumothorax.  Osseous structures are unchanged.      Impression    IMPRESSION:   Increased perihilar and bibasilar mixed pulmonary opacities, which may  represent atelectasis or developing infection. Stable small bilateral  pleural effusions.    I have personally reviewed the examination and initial interpretation  and I agree with the findings.    KRISTA SON MD         SYSTEM ID:  [id number]   XR Chest Port 1 View    Narrative    EXAMINATION:  XR CHEST PORT 1 VIEW 11/11/2021 1:15 AM.    COMPARISON: 11/10/2021.    HISTORY:  s/p lung transplant    FINDINGS: Surgical changes of bilateral lung transplant with clamshell  median sternotomy wires and overlying skin staples. Left PICC tip  projects over the mid SVC, stable. Tracheostomy tube tip projects over  the mid trachea. Left pigtail catheter is stable in position.     Cardiomediastinal silhouette is stable. Pulmonary vasculature is  indistinct. Unchanged small bilateral pleural effusions. Unchanged  mixed perihilar and bibasilar pulmonary opacities. No pneumothorax.  Osseous structures are unchanged.       Impression    IMPRESSION:   Continued perihilar and bibasilar mixed pulmonary opacities, which may  represent atelectasis or developing infection. Stable small bilateral  pleural effusions.    I have personally reviewed the examination and initial interpretation  and I agree with the findings.    FREDDY LEAHY MD         SYSTEM ID:  [id number]

## 2021-11-12 ENCOUNTER — APPOINTMENT (OUTPATIENT)
Dept: OCCUPATIONAL THERAPY | Facility: CLINIC | Age: 56
End: 2021-11-12
Attending: STUDENT IN AN ORGANIZED HEALTH CARE EDUCATION/TRAINING PROGRAM
Payer: COMMERCIAL

## 2021-11-12 ENCOUNTER — APPOINTMENT (OUTPATIENT)
Dept: GENERAL RADIOLOGY | Facility: CLINIC | Age: 56
End: 2021-11-12
Attending: STUDENT IN AN ORGANIZED HEALTH CARE EDUCATION/TRAINING PROGRAM
Payer: COMMERCIAL

## 2021-11-12 ENCOUNTER — APPOINTMENT (OUTPATIENT)
Dept: PHYSICAL THERAPY | Facility: CLINIC | Age: 56
End: 2021-11-12
Attending: STUDENT IN AN ORGANIZED HEALTH CARE EDUCATION/TRAINING PROGRAM
Payer: COMMERCIAL

## 2021-11-12 LAB
ALBUMIN SERPL-MCNC: 1.7 G/DL (ref 3.4–5)
ALP SERPL-CCNC: 138 U/L (ref 40–150)
ALT SERPL W P-5'-P-CCNC: 34 U/L (ref 0–70)
ANION GAP SERPL CALCULATED.3IONS-SCNC: 5 MMOL/L (ref 3–14)
APPEARANCE FLD: ABNORMAL
AST SERPL W P-5'-P-CCNC: 13 U/L (ref 0–45)
BACTERIA ASPIRATE CULT: ABNORMAL
BACTERIA ASPIRATE CULT: ABNORMAL
BACTERIA CSF CULT: NORMAL
BILIRUB SERPL-MCNC: 0.2 MG/DL (ref 0.2–1.3)
BUN SERPL-MCNC: 34 MG/DL (ref 7–30)
BUN SERPL-MCNC: 40 MG/DL (ref 7–30)
BUN SERPL-MCNC: 40 MG/DL (ref 7–30)
CALCIUM SERPL-MCNC: 8.4 MG/DL (ref 8.5–10.1)
CALCIUM SERPL-MCNC: 8.4 MG/DL (ref 8.5–10.1)
CALCIUM SERPL-MCNC: 8.5 MG/DL (ref 8.5–10.1)
CHLORIDE BLD-SCNC: 108 MMOL/L (ref 94–109)
CO2 SERPL-SCNC: 27 MMOL/L (ref 20–32)
CO2 SERPL-SCNC: 28 MMOL/L (ref 20–32)
CO2 SERPL-SCNC: 28 MMOL/L (ref 20–32)
COLOR FLD: COLORLESS
CREAT SERPL-MCNC: 0.58 MG/DL (ref 0.66–1.25)
EOSINOPHIL NFR FLD MANUAL: 1 %
ERYTHROCYTE [DISTWIDTH] IN BLOOD BY AUTOMATED COUNT: 17.4 % (ref 10–15)
GFR SERPL CREATININE-BSD FRML MDRD: >90 ML/MIN/1.73M2
GLUCOSE BLD-MCNC: 156 MG/DL (ref 70–99)
GLUCOSE BLD-MCNC: 196 MG/DL (ref 70–99)
GLUCOSE BLD-MCNC: 196 MG/DL (ref 70–99)
GLUCOSE BLDC GLUCOMTR-MCNC: 124 MG/DL (ref 70–99)
GLUCOSE BLDC GLUCOMTR-MCNC: 129 MG/DL (ref 70–99)
GLUCOSE BLDC GLUCOMTR-MCNC: 136 MG/DL (ref 70–99)
GLUCOSE BLDC GLUCOMTR-MCNC: 138 MG/DL (ref 70–99)
GLUCOSE BLDC GLUCOMTR-MCNC: 139 MG/DL (ref 70–99)
GLUCOSE BLDC GLUCOMTR-MCNC: 146 MG/DL (ref 70–99)
GLUCOSE BLDC GLUCOMTR-MCNC: 155 MG/DL (ref 70–99)
GLUCOSE BLDC GLUCOMTR-MCNC: 171 MG/DL (ref 70–99)
GLUCOSE BLDC GLUCOMTR-MCNC: 174 MG/DL (ref 70–99)
GLUCOSE BLDC GLUCOMTR-MCNC: 71 MG/DL (ref 70–99)
GRAM STAIN RESULT: NORMAL
GRAM STAIN RESULT: NORMAL
HCT VFR BLD AUTO: 27.5 % (ref 40–53)
HGB BLD-MCNC: 8.5 G/DL (ref 13.3–17.7)
HOLD SPECIMEN: NORMAL
INR PPP: 1.1 (ref 0.85–1.15)
KOH PREPARATION: NORMAL
KOH PREPARATION: NORMAL
LYMPHOCYTES NFR FLD MANUAL: 1 %
MAGNESIUM SERPL-MCNC: 1.5 MG/DL (ref 1.6–2.3)
MCH RBC QN AUTO: 30.1 PG (ref 26.5–33)
MCHC RBC AUTO-ENTMCNC: 30.9 G/DL (ref 31.5–36.5)
MCV RBC AUTO: 98 FL (ref 78–100)
MONOS+MACROS NFR FLD MANUAL: NORMAL %
NEUTS BAND NFR FLD MANUAL: 43 %
OTHER CELLS FLD MANUAL: 56 %
PHOSPHATE SERPL-MCNC: 3.6 MG/DL (ref 2.5–4.5)
PLATELET # BLD AUTO: 162 10E3/UL (ref 150–450)
POTASSIUM BLD-SCNC: 4.2 MMOL/L (ref 3.4–5.3)
POTASSIUM BLD-SCNC: 4.2 MMOL/L (ref 3.4–5.3)
POTASSIUM BLD-SCNC: 4.4 MMOL/L (ref 3.4–5.3)
PROT SERPL-MCNC: 4.7 G/DL (ref 6.8–8.8)
RBC # BLD AUTO: 2.82 10E6/UL (ref 4.4–5.9)
SODIUM SERPL-SCNC: 140 MMOL/L (ref 133–144)
SODIUM SERPL-SCNC: 141 MMOL/L (ref 133–144)
SODIUM SERPL-SCNC: 141 MMOL/L (ref 133–144)
UFH PPP CHRO-ACNC: 0.16 IU/ML
UFH PPP CHRO-ACNC: 0.17 IU/ML
UFH PPP CHRO-ACNC: 0.37 IU/ML
WBC # BLD AUTO: 12.1 10E3/UL (ref 4–11)
WBC # FLD AUTO: 367 /UL

## 2021-11-12 PROCEDURE — 250N000012 HC RX MED GY IP 250 OP 636 PS 637: Performed by: NURSE PRACTITIONER

## 2021-11-12 PROCEDURE — 31624 DX BRONCHOSCOPE/LAVAGE: CPT

## 2021-11-12 PROCEDURE — 250N000013 HC RX MED GY IP 250 OP 250 PS 637: Performed by: ANESTHESIOLOGY

## 2021-11-12 PROCEDURE — 89050 BODY FLUID CELL COUNT: CPT | Performed by: INTERNAL MEDICINE

## 2021-11-12 PROCEDURE — 94640 AIRWAY INHALATION TREATMENT: CPT | Mod: 76

## 2021-11-12 PROCEDURE — 250N000013 HC RX MED GY IP 250 OP 250 PS 637: Performed by: STUDENT IN AN ORGANIZED HEALTH CARE EDUCATION/TRAINING PROGRAM

## 2021-11-12 PROCEDURE — 97530 THERAPEUTIC ACTIVITIES: CPT | Mod: GP

## 2021-11-12 PROCEDURE — 87633 RESP VIRUS 12-25 TARGETS: CPT | Performed by: INTERNAL MEDICINE

## 2021-11-12 PROCEDURE — 82374 ASSAY BLOOD CARBON DIOXIDE: CPT

## 2021-11-12 PROCEDURE — 250N000013 HC RX MED GY IP 250 OP 250 PS 637: Performed by: THORACIC SURGERY (CARDIOTHORACIC VASCULAR SURGERY)

## 2021-11-12 PROCEDURE — 87070 CULTURE OTHR SPECIMN AEROBIC: CPT | Performed by: INTERNAL MEDICINE

## 2021-11-12 PROCEDURE — 250N000011 HC RX IP 250 OP 636

## 2021-11-12 PROCEDURE — 94640 AIRWAY INHALATION TREATMENT: CPT

## 2021-11-12 PROCEDURE — 258N000003 HC RX IP 258 OP 636: Performed by: THORACIC SURGERY (CARDIOTHORACIC VASCULAR SURGERY)

## 2021-11-12 PROCEDURE — 87102 FUNGUS ISOLATION CULTURE: CPT | Performed by: INTERNAL MEDICINE

## 2021-11-12 PROCEDURE — 71045 X-RAY EXAM CHEST 1 VIEW: CPT | Mod: 26 | Performed by: RADIOLOGY

## 2021-11-12 PROCEDURE — 97112 NEUROMUSCULAR REEDUCATION: CPT | Mod: GO | Performed by: OCCUPATIONAL THERAPIST

## 2021-11-12 PROCEDURE — 85027 COMPLETE CBC AUTOMATED: CPT | Performed by: THORACIC SURGERY (CARDIOTHORACIC VASCULAR SURGERY)

## 2021-11-12 PROCEDURE — 85520 HEPARIN ASSAY: CPT | Performed by: THORACIC SURGERY (CARDIOTHORACIC VASCULAR SURGERY)

## 2021-11-12 PROCEDURE — 80048 BASIC METABOLIC PNL TOTAL CA: CPT

## 2021-11-12 PROCEDURE — 99207: CPT | Performed by: INTERNAL MEDICINE

## 2021-11-12 PROCEDURE — 85610 PROTHROMBIN TIME: CPT | Performed by: THORACIC SURGERY (CARDIOTHORACIC VASCULAR SURGERY)

## 2021-11-12 PROCEDURE — 250N000011 HC RX IP 250 OP 636: Performed by: THORACIC SURGERY (CARDIOTHORACIC VASCULAR SURGERY)

## 2021-11-12 PROCEDURE — 94668 MNPJ CHEST WALL SBSQ: CPT

## 2021-11-12 PROCEDURE — 250N000011 HC RX IP 250 OP 636: Performed by: STUDENT IN AN ORGANIZED HEALTH CARE EDUCATION/TRAINING PROGRAM

## 2021-11-12 PROCEDURE — 999N000157 HC STATISTIC RCP TIME EA 10 MIN

## 2021-11-12 PROCEDURE — 250N000013 HC RX MED GY IP 250 OP 250 PS 637: Performed by: NURSE PRACTITIONER

## 2021-11-12 PROCEDURE — 71045 X-RAY EXAM CHEST 1 VIEW: CPT

## 2021-11-12 PROCEDURE — 85520 HEPARIN ASSAY: CPT | Performed by: SURGERY

## 2021-11-12 PROCEDURE — 250N000012 HC RX MED GY IP 250 OP 636 PS 637: Performed by: INTERNAL MEDICINE

## 2021-11-12 PROCEDURE — 97110 THERAPEUTIC EXERCISES: CPT | Mod: GP

## 2021-11-12 PROCEDURE — 97530 THERAPEUTIC ACTIVITIES: CPT | Mod: GO | Performed by: OCCUPATIONAL THERAPIST

## 2021-11-12 PROCEDURE — 89051 BODY FLUID CELL COUNT: CPT | Performed by: INTERNAL MEDICINE

## 2021-11-12 PROCEDURE — 250N000013 HC RX MED GY IP 250 OP 250 PS 637

## 2021-11-12 PROCEDURE — 99233 SBSQ HOSP IP/OBS HIGH 50: CPT | Mod: 24 | Performed by: INTERNAL MEDICINE

## 2021-11-12 PROCEDURE — 83735 ASSAY OF MAGNESIUM: CPT | Performed by: THORACIC SURGERY (CARDIOTHORACIC VASCULAR SURGERY)

## 2021-11-12 PROCEDURE — 250N000012 HC RX MED GY IP 250 OP 636 PS 637: Performed by: PHYSICIAN ASSISTANT

## 2021-11-12 PROCEDURE — 87206 SMEAR FLUORESCENT/ACID STAI: CPT | Performed by: INTERNAL MEDICINE

## 2021-11-12 PROCEDURE — 87205 SMEAR GRAM STAIN: CPT | Performed by: INTERNAL MEDICINE

## 2021-11-12 PROCEDURE — 200N000002 HC R&B ICU UMMC

## 2021-11-12 PROCEDURE — 87116 MYCOBACTERIA CULTURE: CPT | Performed by: INTERNAL MEDICINE

## 2021-11-12 PROCEDURE — 94003 VENT MGMT INPAT SUBQ DAY: CPT

## 2021-11-12 PROCEDURE — 250N000009 HC RX 250: Performed by: STUDENT IN AN ORGANIZED HEALTH CARE EDUCATION/TRAINING PROGRAM

## 2021-11-12 PROCEDURE — 84100 ASSAY OF PHOSPHORUS: CPT | Performed by: THORACIC SURGERY (CARDIOTHORACIC VASCULAR SURGERY)

## 2021-11-12 PROCEDURE — 87210 SMEAR WET MOUNT SALINE/INK: CPT | Performed by: INTERNAL MEDICINE

## 2021-11-12 PROCEDURE — 87186 SC STD MICRODIL/AGAR DIL: CPT | Performed by: INTERNAL MEDICINE

## 2021-11-12 RX ORDER — FENTANYL CITRATE 50 UG/ML
INJECTION, SOLUTION INTRAMUSCULAR; INTRAVENOUS
Status: COMPLETED
Start: 2021-11-12 | End: 2021-11-12

## 2021-11-12 RX ORDER — FUROSEMIDE 10 MG/ML
20 INJECTION INTRAMUSCULAR; INTRAVENOUS ONCE
Status: CANCELLED | OUTPATIENT
Start: 2021-11-12

## 2021-11-12 RX ORDER — FUROSEMIDE 20 MG
20 TABLET ORAL
Status: DISCONTINUED | OUTPATIENT
Start: 2021-11-12 | End: 2021-11-12

## 2021-11-12 RX ORDER — PROPOFOL 10 MG/ML
INJECTION, EMULSION INTRAVENOUS
Status: DISCONTINUED
Start: 2021-11-12 | End: 2021-11-13 | Stop reason: HOSPADM

## 2021-11-12 RX ORDER — FUROSEMIDE 10 MG/ML
20 INJECTION INTRAMUSCULAR; INTRAVENOUS ONCE
Status: COMPLETED | OUTPATIENT
Start: 2021-11-12 | End: 2021-11-12

## 2021-11-12 RX ORDER — MAGNESIUM SULFATE HEPTAHYDRATE 40 MG/ML
4 INJECTION, SOLUTION INTRAVENOUS ONCE
Status: COMPLETED | OUTPATIENT
Start: 2021-11-12 | End: 2021-11-12

## 2021-11-12 RX ORDER — FUROSEMIDE 10 MG/ML
20 INJECTION INTRAMUSCULAR; INTRAVENOUS 2 TIMES DAILY
Status: COMPLETED | OUTPATIENT
Start: 2021-11-12 | End: 2021-11-12

## 2021-11-12 RX ORDER — ERTAPENEM 1 G/1
1 INJECTION, POWDER, LYOPHILIZED, FOR SOLUTION INTRAMUSCULAR; INTRAVENOUS EVERY 24 HOURS
Status: DISCONTINUED | OUTPATIENT
Start: 2021-11-12 | End: 2021-11-13

## 2021-11-12 RX ORDER — FENTANYL CITRATE 50 UG/ML
50 INJECTION, SOLUTION INTRAMUSCULAR; INTRAVENOUS
Status: COMPLETED | OUTPATIENT
Start: 2021-11-12 | End: 2021-11-12

## 2021-11-12 RX ADMIN — INSULIN GLARGINE 30 UNITS: 100 INJECTION, SOLUTION SUBCUTANEOUS at 11:29

## 2021-11-12 RX ADMIN — FUROSEMIDE 20 MG: 10 INJECTION, SOLUTION INTRAVENOUS at 16:50

## 2021-11-12 RX ADMIN — PREDNISOLONE 12.5 MG: 15 SOLUTION ORAL at 07:30

## 2021-11-12 RX ADMIN — AMIODARONE HYDROCHLORIDE 200 MG: 200 TABLET ORAL at 07:29

## 2021-11-12 RX ADMIN — ERTAPENEM SODIUM 1 G: 1 INJECTION, POWDER, LYOPHILIZED, FOR SOLUTION INTRAMUSCULAR; INTRAVENOUS at 10:26

## 2021-11-12 RX ADMIN — LEVALBUTEROL HYDROCHLORIDE 1.25 MG: 1.25 SOLUTION RESPIRATORY (INHALATION) at 16:46

## 2021-11-12 RX ADMIN — MULTIVIT AND MINERALS-FERROUS GLUCONATE 9 MG IRON/15 ML ORAL LIQUID 15 ML: at 07:30

## 2021-11-12 RX ADMIN — FENTANYL CITRATE 50 MCG: 50 INJECTION, SOLUTION INTRAMUSCULAR; INTRAVENOUS at 16:29

## 2021-11-12 RX ADMIN — Medication 40 MG: at 07:32

## 2021-11-12 RX ADMIN — ACETYLCYSTEINE 2 ML: 200 SOLUTION ORAL; RESPIRATORY (INHALATION) at 16:46

## 2021-11-12 RX ADMIN — TACROLIMUS 2 MG: 5 CAPSULE ORAL at 07:32

## 2021-11-12 RX ADMIN — Medication 1 PACKET: at 19:59

## 2021-11-12 RX ADMIN — ACETYLCYSTEINE 2 ML: 200 SOLUTION ORAL; RESPIRATORY (INHALATION) at 07:43

## 2021-11-12 RX ADMIN — ROSUVASTATIN CALCIUM 10 MG: 10 TABLET, FILM COATED ORAL at 07:30

## 2021-11-12 RX ADMIN — HYDROXYZINE HYDROCHLORIDE 50 MG: 50 TABLET, FILM COATED ORAL at 16:51

## 2021-11-12 RX ADMIN — LEVALBUTEROL HYDROCHLORIDE 1.25 MG: 1.25 SOLUTION RESPIRATORY (INHALATION) at 20:18

## 2021-11-12 RX ADMIN — NYSTATIN 1000000 UNITS: 500000 SUSPENSION ORAL at 11:18

## 2021-11-12 RX ADMIN — ACYCLOVIR 400 MG: 200 SUSPENSION ORAL at 19:54

## 2021-11-12 RX ADMIN — TACROLIMUS 2 MG: 5 CAPSULE ORAL at 18:01

## 2021-11-12 RX ADMIN — NYSTATIN 1000000 UNITS: 500000 SUSPENSION ORAL at 15:10

## 2021-11-12 RX ADMIN — FUROSEMIDE 20 MG: 10 INJECTION, SOLUTION INTRAVENOUS at 10:43

## 2021-11-12 RX ADMIN — ACYCLOVIR 400 MG: 200 SUSPENSION ORAL at 07:32

## 2021-11-12 RX ADMIN — LIDOCAINE 2 PATCH: 560 PATCH PERCUTANEOUS; TOPICAL; TRANSDERMAL at 07:30

## 2021-11-12 RX ADMIN — CALCIUM CARBONATE 600 MG (1,500 MG)-VITAMIN D3 400 UNIT TABLET 1 TABLET: at 07:30

## 2021-11-12 RX ADMIN — SULFAMETHOXAZOLE AND TRIMETHOPRIM 80 MG: 200; 40 SUSPENSION ORAL at 07:29

## 2021-11-12 RX ADMIN — HEPARIN SODIUM 1100 UNITS/HR: 1000 INJECTION INTRAVENOUS; SUBCUTANEOUS at 22:22

## 2021-11-12 RX ADMIN — Medication 10 MG: at 19:52

## 2021-11-12 RX ADMIN — Medication 40 MG: at 19:54

## 2021-11-12 RX ADMIN — MYCOPHENOLATE MOFETIL 1000 MG: 200 POWDER, FOR SUSPENSION ORAL at 19:53

## 2021-11-12 RX ADMIN — FLUCONAZOLE IN SODIUM CHLORIDE 400 MG: 2 INJECTION, SOLUTION INTRAVENOUS at 11:30

## 2021-11-12 RX ADMIN — Medication 1 PACKET: at 13:40

## 2021-11-12 RX ADMIN — PREDNISOLONE 12.5 MG: 15 SOLUTION ORAL at 19:52

## 2021-11-12 RX ADMIN — FUROSEMIDE 20 MG: 10 INJECTION, SOLUTION INTRAVENOUS at 19:53

## 2021-11-12 RX ADMIN — NYSTATIN 1000000 UNITS: 500000 SUSPENSION ORAL at 19:52

## 2021-11-12 RX ADMIN — MAGNESIUM SULFATE IN WATER 4 G: 40 INJECTION, SOLUTION INTRAVENOUS at 04:07

## 2021-11-12 RX ADMIN — ACETYLCYSTEINE 2 ML: 200 SOLUTION ORAL; RESPIRATORY (INHALATION) at 20:19

## 2021-11-12 RX ADMIN — ACETYLCYSTEINE 2 ML: 200 SOLUTION ORAL; RESPIRATORY (INHALATION) at 12:50

## 2021-11-12 RX ADMIN — CALCIUM CARBONATE 600 MG (1,500 MG)-VITAMIN D3 400 UNIT TABLET 1 TABLET: at 18:01

## 2021-11-12 RX ADMIN — POTASSIUM CHLORIDE 40 MEQ: 40 SOLUTION ORAL at 07:29

## 2021-11-12 RX ADMIN — METOPROLOL TARTRATE 75 MG: 25 TABLET, FILM COATED ORAL at 19:52

## 2021-11-12 RX ADMIN — MIDAZOLAM 1 MG: 1 INJECTION INTRAMUSCULAR; INTRAVENOUS at 16:29

## 2021-11-12 RX ADMIN — Medication 1 PACKET: at 07:31

## 2021-11-12 RX ADMIN — METOPROLOL TARTRATE 75 MG: 25 TABLET, FILM COATED ORAL at 07:30

## 2021-11-12 RX ADMIN — NYSTATIN 1000000 UNITS: 500000 SUSPENSION ORAL at 07:50

## 2021-11-12 RX ADMIN — HYDROXYZINE HYDROCHLORIDE 50 MG: 50 TABLET, FILM COATED ORAL at 23:30

## 2021-11-12 RX ADMIN — MYCOPHENOLATE MOFETIL 1000 MG: 200 POWDER, FOR SUSPENSION ORAL at 07:30

## 2021-11-12 RX ADMIN — ESCITALOPRAM 5 MG: 5 TABLET, FILM COATED ORAL at 07:33

## 2021-11-12 RX ADMIN — LEVALBUTEROL HYDROCHLORIDE 1.25 MG: 1.25 SOLUTION RESPIRATORY (INHALATION) at 12:50

## 2021-11-12 RX ADMIN — LEVALBUTEROL HYDROCHLORIDE 1.25 MG: 1.25 SOLUTION RESPIRATORY (INHALATION) at 07:43

## 2021-11-12 RX ADMIN — HYDROXYZINE HYDROCHLORIDE 50 MG: 50 TABLET, FILM COATED ORAL at 05:33

## 2021-11-12 RX ADMIN — LEVOTHYROXINE SODIUM 25 MCG: 0.03 TABLET ORAL at 07:29

## 2021-11-12 ASSESSMENT — ACTIVITIES OF DAILY LIVING (ADL)
ADLS_ACUITY_SCORE: 27
ADLS_ACUITY_SCORE: 25
ADLS_ACUITY_SCORE: 27
ADLS_ACUITY_SCORE: 25
ADLS_ACUITY_SCORE: 27
ADLS_ACUITY_SCORE: 25
ADLS_ACUITY_SCORE: 27
ADLS_ACUITY_SCORE: 25

## 2021-11-12 ASSESSMENT — MIFFLIN-ST. JEOR: SCORE: 1409

## 2021-11-12 NOTE — PROGRESS NOTES
"Bronchoscopy Risk Assessment Guidelines      A. Patient symptoms to consider when assessing pulmonary TB risk are:    I. Cough greater than 3 weeks; and fever, hemoptysis, pleuritic chest    pain, weight loss greater than 10 lbs, night sweats, fatigue, infiltrates on    upper lobes or superior segments of lower lobes, cavitation on chest    x-ray.   B. Patient risk factors to consider when assessing pulmonary TB risk are:    I. Exposure to known TB case, foreign-born persons (within 5 years of    arrival to [place]), residence in a crowded setting (correctional facility,     long-term care center, etc.), persons with HIV or immunosuppression.    Patients with symptoms and risk factors should generally be considered \"suspect risk\" and bronchoscopies should be performed in airborne precautions.    This patient has NO KNOWN RISK of Tuberculosis (proceed with bronchoscopy)    Specimens sent: yes  Complications: None  Scope used: #[id number]  Slim  Attending Physician: Dr. Mike Felipe, RT on 11/12/2021 at 2:26 PM  "

## 2021-11-12 NOTE — PROGRESS NOTES
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 Corral. 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  UGIB causing code blue on 11/2.     OVERNIGHT EVENTS:  - Respiratory aerobic culture positive for Klebsiella    TODAY'S PROGRESS:   - Trach dome  - Bronch today  - Diurese   - Stop Ceftriaxone  - Start Ertapenam    PLAN:  Neuro/ pain/ sedation:  Acute postoperative pain   Anxiety and depression  Acute to subacute CVA, b/l cerebral hemispheres and L cerebellum, suspect embolic  Encephalopathy and diffuse weakness - improving slightly   R ear lateral canal floor excoriation with bleeding - resolved   - APAP and oxycodone prn, lido patch   - PTA lexapro  - appreciate ENT  - appreciate neuro    Pulmonary care:   SALTY on home CPAP  Acute hypoxic respiratory failure s/p b/l lung transplant 10/16/21  S/p tracheostomy 10/29  Empyema with Candida s/p chest tube 11/3/21  - mechanically ventilated via trach, PST, trach dome  - Levalbuterol nebs and Mucomyst, chest PT  - Daily CXR  - appreciate Pulmonary  - Diurese  - Bronch today    Cardiovascular:    HTN  Afib   PFO  Vasoplegic shock in setting of hypovolemia - resolved  - Monitor hemodynamic status.   - MAP goal <100, SBP <140  - metoprolol 75 BID    - prn labetalol  - statin  - Amiodarone 200 mg daily (end date 11/27/21)  - low-intensity heparin gtt     GI care:   Elevated LFTs - recurrent  GI bleed 2/2 NG trauma, now recurrent secondary to GJ bumper ulcer   Moderate malnutrition in  the context of acute on chronic illness  PEG-J, with malpositioned J tube   - Diet: tf    - PPI bid    Fluids/ Electrolytes/ Nutrition:   Acute kidney injury, recurrent, now with azotemia - resolving   Hypernatremia  Hyperkalemia, resolved   BL creat appears to be ~ 0.7  - free water flushes  - Strict I/O, daily weights  - Avoid/limit nephrotoxins as able  - Replete lytes PRN per protocol   - Diurese 20     Endocrine:    Stress induced hyperglycemia with steroid-induced component, on DM2  Hypothyroidism  RA  Preop A1c 6.6  - insulin gtt   - Goal BG <180 for optimal healing  - continue home synthroid  - lantus 30    ID/ Antibiotics:  Immunosuppression s/p transplant  Candidal infection of donor lungs (likely source), pleural fluid, and fungemia   - NGTD CSF. Last + blood cx 10/22. No vegetations on valves per TC 10/23  - Nystatin, Acyclovir, bactrim   - Tacrolimus (via g tube), prednisone    - appreciate transplant ID, ophthalmology    - d/w ID, no indication to treat Staph epi in sputum   - Fluconazole (end date 11/25)  - Start Ertapenam     Heme:     Acute blood loss anemia secondary to surgery and GI bleed, and anemia related to critical illness and iron deficiency    Hypogammaglobulinemia s/p IVIG  Thrombocytopenia, HIT negative - resolved   Lactic acidosis, resolved  Nonocclusive R subclavian thrombus    - heparin low intensity      MSK/ Skin:  Clamshell surgical incision  - Sternal precautions  - Postoperative incision management per protocol  - PT/OT/CR     Prophylaxis:    - Mechanical prophylaxis for DVT: SCDs  - Chemical DVT prophylaxis: heparin low intensity gtt    - PPI      Lines/ tubes/ drains:  - trach  - peg-j  - Shirley 10/25  - PIVs  - chest tube L  - L PICC      Disposition:  - [facility]    Patient seen, findings and plan discussed with [facility] ICU staff    Aditya Jo  Medical Student    Resident/Fellow Attestation   I, Moises Ribera, was present with the medical student who participated in the service  and in the documentation of the note.  I have verified the history and personally performed the physical exam and medical decision making.  I agree with the assessment and plan of care as documented in the note.      Moises Ribera MD  PGY-3 Anesthesiology        ====================================    SUBJECTIVE:   Laying in bed with trach, responds to voice and moves all extremities    OBJECTIVE:   1. VITAL SIGNS:   Temp:  [97.8  F (36.6  C)-98.2  F (36.8  C)] 98.2  F (36.8  C)  Pulse:  [67-85] 71  Resp:  [17-29] 18  BP: ()/(57-83) 117/66  FiO2 (%):  [40 %] 40 %  SpO2:  [94 %-100 %] 100 %  Ventilation Mode: CPAP/PS  (Continuous positive airway pressure with Pressure Support)  FiO2 (%): 40 %  Rate Set (breaths/minute): 12 breaths/min  Tidal Volume Set (mL): 400 mL  PEEP (cm H2O): 5 cmH2O  Pressure Support (cm H2O): 7 cmH2O  Oxygen Concentration (%): 40 %  Resp: 18      2. INTAKE/ OUTPUT:   I/O last 3 completed shifts:  In: 2555.86 [I.V.:860.86; NG/GT:615]  Out: 1705 [Urine:1435; Emesis/NG output:150; Chest Tube:120]    3. PHYSICAL EXAMINATION:   General: Propped up in bed, sick appearing individual  Neuro: responds to voice moves all extremities  Resp: Trach and ventilated  CV: RRR on tele  Abdomen: Soft, Non-distended, Non-tender  Incisions: c/d/i  Extremities: warm and well perfused    4. INVESTIGATIONS:   Arterial Blood Gases   Recent Labs   Lab 11/11/21  1740 11/06/21  0438   PH 7.48* 7.43   PCO2 37 37   PO2 106* 109*   HCO3 28 25     Complete Blood Count   Recent Labs   Lab 11/12/21  0316 11/11/21  0322 11/10/21  0329 11/09/21  0351   WBC 12.1* 13.4* 21.6* 14.4*   HGB 8.5* 8.9* 10.2* 9.3*    189 273 200     Basic Metabolic Panel  Recent Labs   Lab 11/12/21  0546 11/12/21  0322 11/12/21  0316 11/12/21  0005 11/11/21  1612 11/11/21  1508 11/11/21  0332 11/11/21  0322 11/10/21  1719 11/10/21  1641   NA  --   --  141  141  --   --  146*  --  142  142  --  142   POTASSIUM  --   --  4.2  4.2   --   --  3.9  --  4.0  4.0  --  4.2   CHLORIDE  --   --  108  108  --   --  112*  --  107  107  --  110*   CO2  --   --  28  28  --   --  25  --  29  29  --  26   BUN  --   --  40*  40*  --   --  36*  --  35*  35*  --  35*   CR  --   --  0.58*  0.58*  --   --  0.52*  --  0.56*  0.56*  --  0.64*   * 174* 196*  196* 138*   < > 180*   < > 176*  176*   < > 149*  152*    < > = values in this interval not displayed.     Liver Function Tests  Recent Labs   Lab 11/12/21  0316 11/11/21  0322 11/10/21  0325 11/09/21  0351   AST 13 16 20 17   ALT 34 39 52 60   ALKPHOS 138 143 167* 132   BILITOTAL 0.2 0.3 0.4 0.5   ALBUMIN 1.7* 1.9* 2.0* 1.8*   INR 1.10 1.12 1.12 1.16*     Pancreatic Enzymes  No lab results found in last 7 days.  Coagulation Profile  Recent Labs   Lab 11/12/21  0316 11/11/21  0322 11/10/21  0325 11/09/21  0351   INR 1.10 1.12 1.12 1.16*         5. RADIOLOGY:   No results found for this or any previous visit (from the past 24 hour(s)).    =========================================

## 2021-11-12 NOTE — PROGRESS NOTES
Pulmonary Medicine  Cystic Fibrosis - Lung Transplant Team  Progress Note  2021       Patient: Edson Thornton  MRN: [id number]  : 1965 (age 56 year old)  Transplant: 10/16/2021 (Lung), POD#27  Admission date: 2021    Assessment & Plan:    Edson Thornton is a 56 year old male with a PMH significant for NSIP/ILD, bronchiectasis, moderate PH, RA, SALTY, chronic HSV infection, hypogammaglobulinemia, steroid-induced diabetes, hypothyroidism, PFO, HTN, HLD, duodenal anomaly, anxiety, and depression.  Admitted on 21 from OSH for acute on chronic respiratory failure 2/2 ILD exacerbation, now s/p BSLT on 10/16/21.  Prolonged vent wean s/p trach and PEG/J tube placement with thoracic surgery 10/29.  Post-op course otherwise complicated by encephalopathy and diffuse weakness, acute to subacute CVA, afib with RVR, BRENNAN, GI bleed, Candidemia/Candida empyema, and recurrent fevers.  Code called  for bradycardia/asystole and progressively hypotensive, found to have acute drop in hemoglobin and bloody G tube output. Gradual improvement in mentation and weakness.      Today's recommendations:   - PS and TD trials during the day per ICU team, otherwise CMV settings  - VBG ordered  - Diuresis per ICU team  - CXR daily with left chest tube in place per ID  - Plan for inspection bronch today at 1300  - Tacrolimus level therapeutic, no dose adjustment, repeat level ordered   - Prednisolone taper due  (not yet ordered)  - CMV and EBV ordered .  - Continue acyclovir through 11/15.  - Follow pending blood cultures (11/10).  - Sputum culture (11/10) with Klebsiella pneumoniae, agree with Ertapenem.  - Coccidioides Ag ordered .  - Continue fluconazole through , EKG weekly (, ordered) for QTc monitoring.  - IgG ordered .  - DSA ordered 11/15.  - PHS high risk donor risk labs ordered 11/15.     S/p BSLT for ILD:  Acute hypoxic respiratory failure s/p trach:   Pulmonary  edema:  Bilateral pleural effusions: Unfortunately had not received vaccination for flu, PNA, or COVID-19 PTA.  Explant pathology with NSIP, no malignancy.  PGD 2-3.  Weaned off paralytic 10/19 (for vent dyssynchrony) and Caroline 10/22.  Initial difficulty weaning sedation given agitation then with neurological findings as below.  CXR initially post-op with pulmonary edema, aggressively diuresed.  Recurring fevers post-op, see ID section below.  Prolonged vent wean s/p trach and PEG/J tube placement with thoracic surgery 10/29.  Most recent bronch 10/30 with thick secretions in the RUL and LLL and mild mucosal erythema, bilateral anastomoses intact.  Code called 11/2 for bradycardia/asystole (required 1 round of CPR, no medications) then progressively hypotensive, GI bleed as below.  Chest CT 11/2 with increased bilateral pleural effusions (moderate left, small right), bibasilar atelectasis with area of hypoenhancing parenchyma in LLL (suspicious for infection), and numerous nondisplaced anterior rib fractures bilaterally.  S/p left chest tube placement in IR 11/3, right deferred given very little effusion on US.  Intermittently tolerating PS trials.  - Nebs: levalbuterol and Mucomyst QID  - Aggressive pulmonary toilet with chest physiotherapy QID  - PS and TD trials during the day per ICU team, otherwise CMV settings  - Blood gases on 11/11 shows no hypercapnea.  - Diuresis per ICU team  - CXR daily with left chest tube in place, today with continued perihilar and bibasilar mixed pulmonary opacities and stable small bilateral pleural effusions (personally reviewed with Dr. Mckeon)  - Plan for inspection bronch with BAL 11/12 at 1300     Immunosuppression: s/p induction therapy with basiliximab 10/16 (and high dose IV steroid) and 10/20  - Tacrolimus 2 mg BID (increased 11/8, via G tube).  Goal level 8-12.  Level 11/11 therapeutic at 10, no dose adjustment, repeat level 11/14 (ordered).  - MMF 1000 mg BID (11/2,  decreased given GI bleed, AZA to be avoided given TPMT)  - Prednisolone 12.5 mg BID, next taper due 11/21 (not yet ordered).    Date AM dose (mg) PM dose (mg)   11/7/21 12.5 12.5   11/21/21 12.5 10   12/5/21 10 10   1/2/22 10 7.5   1/30/22 7.5 7.5   2/27/22 7.5 5   3/27/22 5 5   4/24/22 5 2.5      Prophylaxis:   - Bactrim for PJP ppx (held 11/2-11/5 d/t BRENNAN)  - Nystatin for oral candidiasis ppx, 6 month course  - See below for serologies and viral ppx:    Donor Recipient Intervention   CMV status Negative Negative None, CMV monthly (ordered 11/16)   EBV status Positive Positive None, EBV monthly (ordered 11/16)   HSV status N/A Positive Acyclovir POD #1-30 through 11/15 (ordered)  (recent infection history pre-txp)      ID: Concern for possible Strongyloides exposure pre-transplant s/p ivermectin x1 dose (9/17).  Donor and recipient cultures NGTD.  S/p IV ceftazidime/vancomycin for 48h per protocol and additional empiric ceftazidime 10/19-10/23 given recurrent fevers.  Bronch cultures 10/17 with Staph epi.  Cryptococcal Ag negative 10/23.  Worsening leukocytosis 11/10.  - Blood cultures (11/10) NGTD  - Bacterial sputum culture (11/10) with Klebsiella pneumoniae, agree with Ertapenem.  Once sensitivities available we will adjust regimen accordingly.  - Monthly Coccidioides Ag x12 months post-transplant per ID (urine and serum negative 10/17), due 11/17 (ordered)     Recurring fevers, Resolved:  Fevers post-op, Tmax 101.7 POD #1.  Febrile with worsening leukocytosis again 10/25, generally persisting.  Trach sputum culture (10/25) with GPC, normal jean marie.  LP (10/29), xanthochromic with pleocytosis thought to be appropriate given RBC and WBCs, no ABX recommended per transplant ID and neurology.  Bronch culture (10/30) with GPC in clusters, normal jean marie.  S/p empiric meropenem 10/28-11/2.  Trach sputum culture (11/2) with Staph epi, treatment not indicated per transplant ID.  Now  afebrile.     Disseminated Candida: Noted on blood cultures 10/20 and 10/22.  BDG fungitell positive (399) on 10/20.  Respiratory cultures with persistent Candida albicans.  TC 10/23 without evidence of endocarditis.  Ophthalmology consult 10/24 with benign dilated fundoscopic exam.  Candida empyema also noted 10/25, chest tubes inadvertently removed by CVTS 10/28, left chest tube replaced by IR 11/3 as above with ongoing Candida on cultures.  Most recent blood culture 11/2 negative.  - Fluconazole (10/26-11/25 per transplant ID, prior micafungin 10/22-10/27), repeat EKG for QTc monitoring 11/16 (ordered), LFTs as below (see transplant ID note 11/5, will need repeat imaging to ensure left pleural effusion almost entirely resolved before removing chest tube and concluding fluconazole)      HSV: Chronic intermittent active infection pre-transplant with recent HSV infection: crusted lesions throughout left side of jaw, s/p 10 day treatment course of ACV through 10/9.  HSV PCR blood negative 10/17.  - ACV ppx as above (started POD #1 instead of POD #8 given HSV history and location)     Hypogammaglobulinemia: IgG previously low at 364 (9/7).  Noted at 265 at time of transplant, s/p IVIG with premedication 10/21.    - Repeat IgG 11/17 (ordered)     Positive cross match: Note that he received two doses of rituximab in June, which is likely contributing to cross match result.  DSA most recently negative 11/1.  - DSA monitoring q2 weeks (11/15, ordered)     PHS risk criteria donor:  Additional labs required post-transplant (between 4-8 weeks post-op): Hepatitis B, Hepatitis C, and HIV by VISHAL (ordered 11/15).     SALTY: Noted pre-transplant.  Home CPAP 6-12 cm H2O.  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  MRI 10/23 with multifocal subacute infarcts within both cerebral hemispheres and left cerebellum.  DDx include surgery v embolic v infectious.  Heparin drip started 10/23 (intermittently held with GI bleed as below).  Repeat stroke code 10/25 d/t marked decrease in responsiveness with sedation wean, pupil inequality, and absent gag reflex.  CTA head without obvious new pathology, MRI brain (with and without contrast) primarily revealing for infarct, low likelihood of PRES.  Ammonia normal.  VEEG per neuro with severe diffuse encephalopathy.  Gradual improvement noted since 10/29, repeat head CT 11/2 (following code) without new acute intracranial abnormalities.  - AC management per primary team as below     Afib with RVR: Noted 10/18, started on amiodarone drip and transitioned to PO 10/21, dose decreased 10/29 (anticipate 4 week course).  Currently in SR.  Heparin drip as above.  Metoprolol resumed 11/3.     Right subclavian DVT: Seen on UE US from 11/4.  Heparin drip resumed 11/5.        Recurrent GI bleed: Hemoglobin dropped to 6.6 10/22, s/p 2 units pRBC.  OG tube with bloody output, EGD 10/23 noted NJ/OG tube trauma with scant oozing.  Progressive hypotension 11/2, hemoglobin 5.8 with bloody G tube output.  Heparin drip held, transitioned to PPI drip, and MTP activated.  EGD 11/2 with large amount of clotted blood in stomach and area of raised mucosa with small adherent clot near PEG tube site that was clipped, no active bleeding.  Abdominal CT 11/2 with stomach distended with blood products and dilated small bowel.  Maroon stools 11/3, repeat EGD with extensive old blood in stomach, no active bleeding, small nodular area with prior clips clipped again.  Most recent EGD 11/5 with ulcer noted at PEG tube bumper site, gastritis, and suction marks from G tube, and GJ arm is in the duodenal bulb.  Large amount of TF  "noted in G tube drainage 11/7.  AXR 11/9 with GJ tube tip projecting over proximal duodenum.  GI exchanged GJ tube 11/9.  - Management (including AC) per GI and primary team    BRENNAN, Improving:   Hyperkalemia, Resolved: Baseline creatinine 0.7.  BRENNAN noted POD #1, UO also downtrending.  Improved with aggressive diuresis, Cr worsened again (1.38 on 11/2) along with up trending BUN and hyperkalemia.  Nephrology consulted 11/1, thought to be prerenal/component of ATN.  Now improving.    Elevated LFTs, Resolved: Shock liver post-op.  Initial ALT//230 evening of surgery, then with marked increase to 1550/1246 POD #1.  Had improved although now with acute rise again likely in setting of shock.    We appreciate the excellent care provided by the CVICU and CVTS teams.  Recommendations communicated via in person rounding and this note.  Will continue to follow along closely, please do not hesitate to call with any questions or concerns.       Subjective & Interval History:     Fair sleep and anxious at times.  Predominantly on CMV 12/400/5/40.  TD for 2h once yesterday, limited due to dyspnea. Doing well on PST today in AM.  Left chest tube with decreasing output.  Net even with diuresis yesterday although weight down trending.  TF at goal, having BMs, intermittent nausea.    Review of Systems:     ROS limited due to communication barriers    C: No fever  INTEGUMENTARY/SKIN: No rash or obvious new lesions  ENT/MOUTH: No nasal drainage  RESP: See interval history  CV: No chest pain, no peripheral edema  GI: No vomiting, no reflux symptoms  : + Watson  MUSCULOSKELETAL: No myalgias, no arthralgias  ENDOCRINE: Blood sugars with adequate control  NEURO: No headache  PSYCHIATRIC: See interval history    Physical Exam:     Vital signs:  Temp: 97.9  F (36.6  C) Temp src: Oral BP: 127/73 Pulse: 73   Resp: 22 SpO2: 97 % O2 Device: Mechanical Ventilator Oxygen Delivery: 45 LPM Height: 162.6 cm (5' 4\") Weight: 66.8 kg (147 lb 4.3 " oz)  I/O:     Intake/Output Summary (Last 24 hours) at 11/12/2021 1300  Last data filed at 11/12/2021 1200  Gross per 24 hour   Intake 2621.53 ml   Output 2035 ml   Net 586.53 ml       Constitutional: Sitting up in chair, in no apparent distress.   HEENT: Eyes with pink conjunctivae, anicteric.  Oral mucosa moist without lesions.  Trach cdi.  PULM: Mildly diminished air flow to bases bilaterally.  Scattered coarse crackles.  No rhonchi, no wheezes.  Mildly labored breathing at times on full vent support.  CV: Normal S1 and S2.  RRR.  No murmur, gallop, or rub.  No peripheral edema.   ABD: NABS, soft, epigastric tenderness with palpation, nondistended.    MSK: Able to move left > right UE and wiggle BLE toes.  No apparent muscle wasting.   NEURO: Alert, following commands, answering some yes/no questions by nodding/shaking head.  SKIN: Warm, dry.  No rash on limited exam.   PSYCH: Mildly anxious.     Lines, Drains, and Devices:  Peripheral IV 11/02/21 Anterior;Right Upper forearm (Active)   Site Assessment [facility] 11/11/21 0400   Line Status Infusing;Checked every 1-2 hour 11/11/21 0400   Dressing Intervention Dressing reinforced 11/08/21 2000   Phlebitis Scale 0-->no symptoms 11/11/21 0400   Infiltration Scale 0 11/11/21 0400   Infiltration Site Treatment Method  None 11/10/21 0400   Number of days: 9       PICC Triple Lumen 11/04/21 Left Basilic Access. PICC okay to use. (Active)   Site Assessment [facility] 11/11/21 0400   External Cath Length (cm) 1 cm 11/04/21 1747   Extremity Circumference (cm) 28 cm 11/04/21 1747   Dressing Intervention Chlorhexidine patch;Transparent 11/11/21 0400   Dressing Change Due 11/14/21 11/11/21 0400   Gray - Status infusing 11/11/21 0400   Gray - Cap Change Due 11/12/21 11/11/21 0400   Red - Status saline locked 11/11/21 0400   Red - Cap Change Due 11/12/21 11/11/21 0400   White - Status infusing 11/11/21 0400   White - Cap Change Due 11/12/21 11/11/21 0400   Extravasation? No 11/10/21 2000    Line Necessity Yes, meets criteria 11/11/21 0400   Number of days: 7     Data:     LABS    CMP:   Recent Labs   Lab 11/12/21  1129 11/12/21  1050 11/12/21  0746 11/12/21  0546 11/12/21  0322 11/12/21  0316 11/11/21  1612 11/11/21  1508 11/11/21  0332 11/11/21  0322 11/10/21  1719 11/10/21  1641 11/10/21  0406 11/10/21  0325 11/09/21  0539 11/09/21  0351   NA  --   --   --   --   --  141  141  --  146*  --  142  142  --  142  --  142  142   < > 143  143   POTASSIUM  --   --   --   --   --  4.2  4.2  --  3.9  --  4.0  4.0  --  4.2  --  4.1  4.1   < > 4.3  4.3   CHLORIDE  --   --   --   --   --  108  108  --  112*  --  107  107  --  110*  --  109  109   < > 112*  112*   CO2  --   --   --   --   --  28  28  --  25  --  29  29  --  26  --  27  27   < > 29  29   ANIONGAP  --   --   --   --   --  5  5  --  9  --  6  6  --  6  --  6  6   < > 2*  2*   * 71 146* 155*   < > 196*  196*   < > 180*   < > 176*  176*   < > 149*  152*   < > 150*  150*   < > 125*  125*  127*   BUN  --   --   --   --   --  40*  40*  --  36*  --  35*  35*  --  35*  --  32*  32*   < > 25  25   CR  --   --   --   --   --  0.58*  0.58*  --  0.52*  --  0.56*  0.56*  --  0.64*  --  0.64*  0.64*   < > 0.58*  0.58*   GFRESTIMATED  --   --   --   --   --  >90  >90  --  >90  --  >90  >90  --  >90  --  >90  >90   < > >90  >90   ERIK  --   --   --   --   --  8.4*  8.4*  --  7.5*  --  8.5  8.5  --  8.5  --  8.8  8.8   < > 8.6  8.6   MAG  --   --   --   --   --  1.5*  --   --   --  1.9  --  1.8  --  1.7   < > 1.8   PHOS  --   --   --   --   --  3.6  --   --   --  3.5  --  3.6  --  3.4  --  3.6   PROTTOTAL  --   --   --   --   --  4.7*  --   --   --  4.9*  --   --   --  5.3*  --  5.0*   ALBUMIN  --   --   --   --   --  1.7*  --   --   --  1.9*  --   --   --  2.0*  --  1.8*   BILITOTAL  --   --   --   --   --  0.2  --   --   --  0.3  --   --   --  0.4  --  0.5   ALKPHOS  --   --   --   --   --  138  --   --   --  143   --   --   --  167*  --  132   AST  --   --   --   --   --  13  --   --   --  16  --   --   --  20  --  17   ALT  --   --   --   --   --  34  --   --   --  39  --   --   --  52  --  60    < > = values in this interval not displayed.     CBC:   Recent Labs   Lab 11/12/21  0316 11/11/21  0322 11/10/21  0329 11/09/21  0351   WBC 12.1* 13.4* 21.6* 14.4*   RBC 2.82* 2.93* 3.40* 3.08*   HGB 8.5* 8.9* 10.2* 9.3*   HCT 27.5* 28.4* 32.7* 30.0*   MCV 98 97 96 97   MCH 30.1 30.4 30.0 30.2   MCHC 30.9* 31.3* 31.2* 31.0*   RDW 17.4* 17.7* 18.2* 18.2*    189 273 200       INR:   Recent Labs   Lab 11/12/21  0316 11/11/21  0322 11/10/21  0325 11/09/21  0351   INR 1.10 1.12 1.12 1.16*       Glucose:   Recent Labs   Lab 11/12/21  1129 11/12/21  1050 11/12/21  0746 11/12/21  0546 11/12/21  0322 11/12/21  0316   * 71 146* 155* 174* 196*  196*       Blood Gas:   Recent Labs   Lab 11/11/21  1740 11/11/21  1516 11/11/21  1029 11/07/21  0626   PHV  --  7.43 7.43 7.45*   PCO2V  --  40 46 40   PO2V  --  34 31 38   HCO3V  --  26 31* 28   LORI  --  1.8 5.5* 3.3*   O2PER 40 40 40 40       Culture Data No results for input(s): CULT in the last 168 hours.    Virology Data:   Lab Results   Component Value Date    FLUAH1 Negative 10/17/2021    FLUAH3 Negative 10/17/2021    WA2932 Negative 10/17/2021    IFLUB Negative 10/17/2021    RSVA Negative 10/17/2021    RSVB Negative 10/17/2021    PIV1 Negative 10/17/2021    PIV2 Negative 10/17/2021    PIV3 Negative 10/17/2021    HMPV Negative 10/17/2021    HRVS Negative 10/17/2021    ADVBE Negative 10/17/2021    ADVC Negative 10/17/2021       Historical CMV results (last 3 of prior testing):  Lab Results   Component Value Date    CMVQNT Not Detected 10/26/2021     No results found for: CMVLOG    Urine Studies    Recent Labs   Lab Test 11/01/21  1336 10/25/21  1507   URINEPH 5.5 5.5   NITRITE Negative Negative   LEUKEST Negative Negative   WBCU 0 2       Most Recent [place] Pulmonary Function  Testing (FVC/FEV1 only)  No results found for: 20002  No results found for: 20003  No results found for: 20015  No results found for: 20016    IMAGING    Recent Results (from the past 48 hour(s))   XR Fluoro Time 0/1 Hour    Narrative    This exam was marked as non-reportable because it will not be read by a   radiologist or a [facility] non-radiologist provider.         XR Chest Port 1 View    Narrative    EXAMINATION:  XR CHEST PORT 1 VIEW 11/10/2021 12:44 AM.    COMPARISON: 11/9/2021.    HISTORY:  s/p lung transplant    FINDINGS: Surgical changes of bilateral lung transplant with clamshell  median sternotomy wires and overlying skin staples. Left PICC tip  projects over the innominate-SVC confluence, stable. Tracheostomy tube  tip projects over the mid trachea. Left pigtail catheter is stable in  position.     Cardiomediastinal silhouette is stable. Pulmonary vasculature is  indistinct. Unchanged small bilateral pleural effusions. Increased  mixed perihilar and bibasilar pulmonary opacities. No pneumothorax.  Osseous structures are unchanged.      Impression    IMPRESSION:   Increased perihilar and bibasilar mixed pulmonary opacities, which may  represent atelectasis or developing infection. Stable small bilateral  pleural effusions.    I have personally reviewed the examination and initial interpretation  and I agree with the findings.    KRISTA SON MD         SYSTEM ID:  [id number]   XR Chest Port 1 View    Narrative    EXAMINATION:  XR CHEST PORT 1 VIEW 11/11/2021 1:15 AM.    COMPARISON: 11/10/2021.    HISTORY:  s/p lung transplant    FINDINGS: Surgical changes of bilateral lung transplant with clamshell  median sternotomy wires and overlying skin staples. Left PICC tip  projects over the mid SVC, stable. Tracheostomy tube tip projects over  the mid trachea. Left pigtail catheter is stable in position.     Cardiomediastinal silhouette is stable. Pulmonary vasculature is  indistinct. Unchanged small bilateral  pleural effusions. Unchanged  mixed perihilar and bibasilar pulmonary opacities. No pneumothorax.  Osseous structures are unchanged.      Impression    IMPRESSION:   Continued perihilar and bibasilar mixed pulmonary opacities, which may  represent atelectasis or developing infection. Stable small bilateral  pleural effusions.    I have personally reviewed the examination and initial interpretation  and I agree with the findings.    FREDDY LEAHY MD         SYSTEM ID:  [id number]

## 2021-11-12 NOTE — PLAN OF CARE
Major Shift Events:  Pt having increased work of breathing at 2115, RT notified and bag suctioned pt, Atarax given, MD notified and Seroquel ordered and given, Pt calming down. Pt switched from CMV to PS 7/5 at midnight, pt more comfortable on vent. 4g Mag replaced. Heparin gtt increased to 950 unit(s)/nr, next 10A at 1000.  Plan: Bronch today. Continue to monitor and notify MD of questions or concerns.  For vital signs and complete assessments, please see documentation flowsheets.

## 2021-11-13 ENCOUNTER — APPOINTMENT (OUTPATIENT)
Dept: GENERAL RADIOLOGY | Facility: CLINIC | Age: 56
End: 2021-11-13
Attending: STUDENT IN AN ORGANIZED HEALTH CARE EDUCATION/TRAINING PROGRAM
Payer: COMMERCIAL

## 2021-11-13 LAB
ALBUMIN SERPL-MCNC: 2 G/DL (ref 3.4–5)
ALP SERPL-CCNC: 144 U/L (ref 40–150)
ALT SERPL W P-5'-P-CCNC: 33 U/L (ref 0–70)
ANION GAP SERPL CALCULATED.3IONS-SCNC: 3 MMOL/L (ref 3–14)
ANION GAP SERPL CALCULATED.3IONS-SCNC: 3 MMOL/L (ref 3–14)
ANION GAP SERPL CALCULATED.3IONS-SCNC: 7 MMOL/L (ref 3–14)
AST SERPL W P-5'-P-CCNC: 16 U/L (ref 0–45)
BILIRUB SERPL-MCNC: 0.3 MG/DL (ref 0.2–1.3)
BUN SERPL-MCNC: 42 MG/DL (ref 7–30)
BUN SERPL-MCNC: 44 MG/DL (ref 7–30)
BUN SERPL-MCNC: 44 MG/DL (ref 7–30)
CALCIUM SERPL-MCNC: 8.6 MG/DL (ref 8.5–10.1)
CALCIUM SERPL-MCNC: 8.6 MG/DL (ref 8.5–10.1)
CALCIUM SERPL-MCNC: 9 MG/DL (ref 8.5–10.1)
CHLORIDE BLD-SCNC: 106 MMOL/L (ref 94–109)
CMV DNA SPEC NAA+PROBE-ACNC: NOT DETECTED IU/ML
CO2 SERPL-SCNC: 29 MMOL/L (ref 20–32)
CO2 SERPL-SCNC: 31 MMOL/L (ref 20–32)
CO2 SERPL-SCNC: 31 MMOL/L (ref 20–32)
CREAT SERPL-MCNC: 0.61 MG/DL (ref 0.66–1.25)
CREAT SERPL-MCNC: 0.61 MG/DL (ref 0.66–1.25)
CREAT SERPL-MCNC: 0.68 MG/DL (ref 0.66–1.25)
ERYTHROCYTE [DISTWIDTH] IN BLOOD BY AUTOMATED COUNT: 17.4 % (ref 10–15)
FLUAV H1 2009 PAND RNA SPEC QL NAA+PROBE: NEGATIVE
FLUAV H1 RNA SPEC QL NAA+PROBE: NEGATIVE
FLUAV H3 RNA SPEC QL NAA+PROBE: NEGATIVE
FLUAV RNA SPEC QL NAA+PROBE: NEGATIVE
FLUBV RNA SPEC QL NAA+PROBE: NEGATIVE
GFR SERPL CREATININE-BSD FRML MDRD: >90 ML/MIN/1.73M2
GLUCOSE BLD-MCNC: 184 MG/DL (ref 70–99)
GLUCOSE BLD-MCNC: 190 MG/DL (ref 70–99)
GLUCOSE BLD-MCNC: 190 MG/DL (ref 70–99)
GLUCOSE BLDC GLUCOMTR-MCNC: 130 MG/DL (ref 70–99)
GLUCOSE BLDC GLUCOMTR-MCNC: 136 MG/DL (ref 70–99)
GLUCOSE BLDC GLUCOMTR-MCNC: 140 MG/DL (ref 70–99)
GLUCOSE BLDC GLUCOMTR-MCNC: 156 MG/DL (ref 70–99)
GLUCOSE BLDC GLUCOMTR-MCNC: 168 MG/DL (ref 70–99)
HADV DNA SPEC QL NAA+PROBE: NEGATIVE
HADV DNA SPEC QL NAA+PROBE: NEGATIVE
HCT VFR BLD AUTO: 29 % (ref 40–53)
HGB BLD-MCNC: 9 G/DL (ref 13.3–17.7)
HMPV RNA SPEC QL NAA+PROBE: NEGATIVE
HPIV1 RNA SPEC QL NAA+PROBE: NEGATIVE
HPIV2 RNA SPEC QL NAA+PROBE: NEGATIVE
HPIV3 RNA SPEC QL NAA+PROBE: NEGATIVE
INR PPP: 1.1 (ref 0.85–1.15)
MAGNESIUM SERPL-MCNC: 1.6 MG/DL (ref 1.6–2.3)
MCH RBC QN AUTO: 30.3 PG (ref 26.5–33)
MCHC RBC AUTO-ENTMCNC: 31 G/DL (ref 31.5–36.5)
MCV RBC AUTO: 98 FL (ref 78–100)
PHOSPHATE SERPL-MCNC: 4.3 MG/DL (ref 2.5–4.5)
PLATELET # BLD AUTO: 169 10E3/UL (ref 150–450)
POTASSIUM BLD-SCNC: 4.1 MMOL/L (ref 3.4–5.3)
POTASSIUM BLD-SCNC: 4.1 MMOL/L (ref 3.4–5.3)
POTASSIUM BLD-SCNC: 4.3 MMOL/L (ref 3.4–5.3)
PROT SERPL-MCNC: 5 G/DL (ref 6.8–8.8)
RBC # BLD AUTO: 2.97 10E6/UL (ref 4.4–5.9)
RHINOVIRUS RNA SPEC QL NAA+PROBE: NEGATIVE
RSV RNA SPEC QL NAA+PROBE: NEGATIVE
RSV RNA SPEC QL NAA+PROBE: NEGATIVE
SODIUM SERPL-SCNC: 140 MMOL/L (ref 133–144)
SODIUM SERPL-SCNC: 140 MMOL/L (ref 133–144)
SODIUM SERPL-SCNC: 142 MMOL/L (ref 133–144)
UFH PPP CHRO-ACNC: 0.32 IU/ML
WBC # BLD AUTO: 16.4 10E3/UL (ref 4–11)

## 2021-11-13 PROCEDURE — 250N000012 HC RX MED GY IP 250 OP 636 PS 637: Performed by: PHYSICIAN ASSISTANT

## 2021-11-13 PROCEDURE — 85610 PROTHROMBIN TIME: CPT | Performed by: THORACIC SURGERY (CARDIOTHORACIC VASCULAR SURGERY)

## 2021-11-13 PROCEDURE — 250N000012 HC RX MED GY IP 250 OP 636 PS 637: Performed by: NURSE PRACTITIONER

## 2021-11-13 PROCEDURE — 200N000002 HC R&B ICU UMMC

## 2021-11-13 PROCEDURE — 250N000013 HC RX MED GY IP 250 OP 250 PS 637: Performed by: STUDENT IN AN ORGANIZED HEALTH CARE EDUCATION/TRAINING PROGRAM

## 2021-11-13 PROCEDURE — 999N000157 HC STATISTIC RCP TIME EA 10 MIN

## 2021-11-13 PROCEDURE — 94668 MNPJ CHEST WALL SBSQ: CPT

## 2021-11-13 PROCEDURE — 84100 ASSAY OF PHOSPHORUS: CPT | Performed by: THORACIC SURGERY (CARDIOTHORACIC VASCULAR SURGERY)

## 2021-11-13 PROCEDURE — 250N000013 HC RX MED GY IP 250 OP 250 PS 637: Performed by: NURSE PRACTITIONER

## 2021-11-13 PROCEDURE — 94640 AIRWAY INHALATION TREATMENT: CPT | Mod: 76

## 2021-11-13 PROCEDURE — 999N000065 XR CHEST PORT 1 VIEW

## 2021-11-13 PROCEDURE — 85014 HEMATOCRIT: CPT | Performed by: THORACIC SURGERY (CARDIOTHORACIC VASCULAR SURGERY)

## 2021-11-13 PROCEDURE — 83735 ASSAY OF MAGNESIUM: CPT | Performed by: THORACIC SURGERY (CARDIOTHORACIC VASCULAR SURGERY)

## 2021-11-13 PROCEDURE — 94640 AIRWAY INHALATION TREATMENT: CPT

## 2021-11-13 PROCEDURE — 85520 HEPARIN ASSAY: CPT | Performed by: THORACIC SURGERY (CARDIOTHORACIC VASCULAR SURGERY)

## 2021-11-13 PROCEDURE — 250N000013 HC RX MED GY IP 250 OP 250 PS 637: Performed by: THORACIC SURGERY (CARDIOTHORACIC VASCULAR SURGERY)

## 2021-11-13 PROCEDURE — 250N000009 HC RX 250: Performed by: STUDENT IN AN ORGANIZED HEALTH CARE EDUCATION/TRAINING PROGRAM

## 2021-11-13 PROCEDURE — 250N000012 HC RX MED GY IP 250 OP 636 PS 637: Performed by: INTERNAL MEDICINE

## 2021-11-13 PROCEDURE — 250N000011 HC RX IP 250 OP 636: Performed by: STUDENT IN AN ORGANIZED HEALTH CARE EDUCATION/TRAINING PROGRAM

## 2021-11-13 PROCEDURE — 71045 X-RAY EXAM CHEST 1 VIEW: CPT | Mod: 77

## 2021-11-13 PROCEDURE — 80048 BASIC METABOLIC PNL TOTAL CA: CPT

## 2021-11-13 PROCEDURE — 250N000011 HC RX IP 250 OP 636

## 2021-11-13 PROCEDURE — 71045 X-RAY EXAM CHEST 1 VIEW: CPT | Mod: 26 | Performed by: RADIOLOGY

## 2021-11-13 PROCEDURE — 94003 VENT MGMT INPAT SUBQ DAY: CPT

## 2021-11-13 PROCEDURE — 250N000013 HC RX MED GY IP 250 OP 250 PS 637: Performed by: ANESTHESIOLOGY

## 2021-11-13 PROCEDURE — 71045 X-RAY EXAM CHEST 1 VIEW: CPT

## 2021-11-13 PROCEDURE — 250N000013 HC RX MED GY IP 250 OP 250 PS 637

## 2021-11-13 PROCEDURE — 99233 SBSQ HOSP IP/OBS HIGH 50: CPT | Mod: 24 | Performed by: INTERNAL MEDICINE

## 2021-11-13 RX ORDER — FUROSEMIDE 10 MG/ML
20 INJECTION INTRAMUSCULAR; INTRAVENOUS ONCE
Status: COMPLETED | OUTPATIENT
Start: 2021-11-13 | End: 2021-11-13

## 2021-11-13 RX ORDER — DEXTROSE MONOHYDRATE 25 G/50ML
25-50 INJECTION, SOLUTION INTRAVENOUS
Status: DISCONTINUED | OUTPATIENT
Start: 2021-11-13 | End: 2021-11-13

## 2021-11-13 RX ORDER — NICOTINE POLACRILEX 4 MG
15-30 LOZENGE BUCCAL
Status: DISCONTINUED | OUTPATIENT
Start: 2021-11-13 | End: 2021-11-13

## 2021-11-13 RX ORDER — CEFTRIAXONE 2 G/1
2 INJECTION, POWDER, FOR SOLUTION INTRAMUSCULAR; INTRAVENOUS EVERY 24 HOURS
Status: DISCONTINUED | OUTPATIENT
Start: 2021-11-14 | End: 2021-11-15

## 2021-11-13 RX ADMIN — Medication 1 PACKET: at 20:57

## 2021-11-13 RX ADMIN — AMIODARONE HYDROCHLORIDE 200 MG: 200 TABLET ORAL at 07:30

## 2021-11-13 RX ADMIN — NYSTATIN 1000000 UNITS: 500000 SUSPENSION ORAL at 15:13

## 2021-11-13 RX ADMIN — FUROSEMIDE 20 MG: 10 INJECTION, SOLUTION INTRAVENOUS at 09:30

## 2021-11-13 RX ADMIN — CALCIUM CARBONATE 600 MG (1,500 MG)-VITAMIN D3 400 UNIT TABLET 1 TABLET: at 07:30

## 2021-11-13 RX ADMIN — MULTIVIT AND MINERALS-FERROUS GLUCONATE 9 MG IRON/15 ML ORAL LIQUID 15 ML: at 07:30

## 2021-11-13 RX ADMIN — ACETYLCYSTEINE 2 ML: 200 SOLUTION ORAL; RESPIRATORY (INHALATION) at 08:05

## 2021-11-13 RX ADMIN — ACETAMINOPHEN 975 MG: 325 TABLET, FILM COATED ORAL at 00:31

## 2021-11-13 RX ADMIN — ROSUVASTATIN CALCIUM 10 MG: 10 TABLET, FILM COATED ORAL at 07:30

## 2021-11-13 RX ADMIN — ERTAPENEM SODIUM 1 G: 1 INJECTION, POWDER, LYOPHILIZED, FOR SOLUTION INTRAMUSCULAR; INTRAVENOUS at 09:31

## 2021-11-13 RX ADMIN — Medication 1 PACKET: at 07:30

## 2021-11-13 RX ADMIN — POTASSIUM CHLORIDE 40 MEQ: 40 SOLUTION ORAL at 07:30

## 2021-11-13 RX ADMIN — Medication 40 MG: at 07:30

## 2021-11-13 RX ADMIN — FLUCONAZOLE IN SODIUM CHLORIDE 400 MG: 2 INJECTION, SOLUTION INTRAVENOUS at 11:39

## 2021-11-13 RX ADMIN — TACROLIMUS 2 MG: 5 CAPSULE ORAL at 07:31

## 2021-11-13 RX ADMIN — ESCITALOPRAM 5 MG: 5 TABLET, FILM COATED ORAL at 07:32

## 2021-11-13 RX ADMIN — Medication 1 PACKET: at 13:20

## 2021-11-13 RX ADMIN — Medication 10 MG: at 20:57

## 2021-11-13 RX ADMIN — HYDROXYZINE HYDROCHLORIDE 50 MG: 50 TABLET, FILM COATED ORAL at 08:12

## 2021-11-13 RX ADMIN — MYCOPHENOLATE MOFETIL 1000 MG: 200 POWDER, FOR SUSPENSION ORAL at 20:58

## 2021-11-13 RX ADMIN — NYSTATIN 1000000 UNITS: 500000 SUSPENSION ORAL at 20:58

## 2021-11-13 RX ADMIN — PREDNISOLONE 12.5 MG: 15 SOLUTION ORAL at 07:31

## 2021-11-13 RX ADMIN — LIDOCAINE 2 PATCH: 560 PATCH PERCUTANEOUS; TOPICAL; TRANSDERMAL at 07:32

## 2021-11-13 RX ADMIN — ACYCLOVIR 400 MG: 200 SUSPENSION ORAL at 07:30

## 2021-11-13 RX ADMIN — LEVALBUTEROL HYDROCHLORIDE 1.25 MG: 1.25 SOLUTION RESPIRATORY (INHALATION) at 15:31

## 2021-11-13 RX ADMIN — Medication 40 MG: at 20:58

## 2021-11-13 RX ADMIN — ACETYLCYSTEINE 2 ML: 200 SOLUTION ORAL; RESPIRATORY (INHALATION) at 12:02

## 2021-11-13 RX ADMIN — FUROSEMIDE 20 MG: 10 INJECTION, SOLUTION INTRAVENOUS at 15:13

## 2021-11-13 RX ADMIN — LEVALBUTEROL HYDROCHLORIDE 1.25 MG: 1.25 SOLUTION RESPIRATORY (INHALATION) at 12:02

## 2021-11-13 RX ADMIN — NYSTATIN 1000000 UNITS: 500000 SUSPENSION ORAL at 07:30

## 2021-11-13 RX ADMIN — CALCIUM CARBONATE 600 MG (1,500 MG)-VITAMIN D3 400 UNIT TABLET 1 TABLET: at 17:07

## 2021-11-13 RX ADMIN — LEVALBUTEROL HYDROCHLORIDE 1.25 MG: 1.25 SOLUTION RESPIRATORY (INHALATION) at 19:55

## 2021-11-13 RX ADMIN — METOPROLOL TARTRATE 75 MG: 25 TABLET, FILM COATED ORAL at 07:30

## 2021-11-13 RX ADMIN — SULFAMETHOXAZOLE AND TRIMETHOPRIM 80 MG: 200; 40 SUSPENSION ORAL at 07:31

## 2021-11-13 RX ADMIN — ACETYLCYSTEINE 2 ML: 200 SOLUTION ORAL; RESPIRATORY (INHALATION) at 19:55

## 2021-11-13 RX ADMIN — LEVALBUTEROL HYDROCHLORIDE 1.25 MG: 1.25 SOLUTION RESPIRATORY (INHALATION) at 08:05

## 2021-11-13 RX ADMIN — ACYCLOVIR 400 MG: 200 SUSPENSION ORAL at 20:58

## 2021-11-13 RX ADMIN — METOPROLOL TARTRATE 75 MG: 25 TABLET, FILM COATED ORAL at 20:57

## 2021-11-13 RX ADMIN — NYSTATIN 1000000 UNITS: 500000 SUSPENSION ORAL at 11:19

## 2021-11-13 RX ADMIN — HYDROXYZINE HYDROCHLORIDE 50 MG: 50 TABLET, FILM COATED ORAL at 20:57

## 2021-11-13 RX ADMIN — INSULIN GLARGINE 30 UNITS: 100 INJECTION, SOLUTION SUBCUTANEOUS at 11:20

## 2021-11-13 RX ADMIN — MYCOPHENOLATE MOFETIL 1000 MG: 200 POWDER, FOR SUSPENSION ORAL at 07:31

## 2021-11-13 RX ADMIN — ACETYLCYSTEINE 2 ML: 200 SOLUTION ORAL; RESPIRATORY (INHALATION) at 15:31

## 2021-11-13 RX ADMIN — PREDNISOLONE 12.5 MG: 15 SOLUTION ORAL at 20:58

## 2021-11-13 RX ADMIN — LEVOTHYROXINE SODIUM 25 MCG: 0.03 TABLET ORAL at 07:30

## 2021-11-13 RX ADMIN — TACROLIMUS 2 MG: 5 CAPSULE ORAL at 17:08

## 2021-11-13 ASSESSMENT — ACTIVITIES OF DAILY LIVING (ADL)
ADLS_ACUITY_SCORE: 25

## 2021-11-13 ASSESSMENT — MIFFLIN-ST. JEOR: SCORE: 1376

## 2021-11-13 NOTE — PROGRESS NOTES
Major Shift Events: Remained on pressure support throughout shift. Trach dome weaned x2 for a total of 2.5 hrs. Complained of anxiety, given PRN atarax x1. TF at goal, gastric tube to gravity drainage.   Plan: Pulmonary toilet. Transfer to [facility].   For vital signs and complete assessments, please see documentation flowsheets.

## 2021-11-13 NOTE — PROGRESS NOTES
Lung Transplant Consult Follow Up Note   November 13, 2021            Assessment and Plan:   Edson Thornton is a 56 year old male with a PMH significant for NSIP/ILD, bronchiectasis, moderate PH, RA, SALTY, chronic HSV infection, hypogammaglobulinemia, steroid-induced diabetes, hypothyroidism, PFO, HTN, HLD, duodenal anomaly, anxiety, and depression.  Admitted on 9/5/21 from OSH for acute on chronic respiratory failure 2/2 ILD exacerbation, now s/p BSLT on 10/16/21.  Prolonged vent wean s/p trach and PEG/J tube placement with thoracic surgery 10/29.  Post-op course otherwise complicated by encephalopathy and diffuse weakness, acute to subacute CVA, afib with RVR, BRENNAN, GI bleed, Candidemia/Candida empyema, and recurrent fevers.  Code called 11/2 for bradycardia/asystole and progressively hypotensive, found to have acute drop in hemoglobin and bloody G tube output. Gradual improvement in mentation and weakness.      Today's recommendations:   -Contact thoracic surgery for trach change due to cuff leak.  -Trach dome trials as tolerated.  Rest on CMV between trach dome trials.  -Continue current tacrolimus.  Recheck level tomorrow.  (Ordered)  - Diuresis per ICU team  - Prednisolone taper due 11/21 (not yet ordered)  - CMV and EBV ordered 11/16.  - Continue acyclovir through 11/15.  - Follow pending blood cultures (11/10).  - Sputum culture (11/10) with Klebsiella pneumoniae, widely sensitive, consider changing to ceftriaxone.  - Coccidioides Ag ordered 11/17.  - Continue fluconazole through 11/25, EKG weekly (11/16, ordered) for QTc monitoring.  - IgG ordered 11/17.  - DSA ordered 11/15.  - PHS high risk donor risk labs ordered 11/15.     S/p BSLT for ILD:  Acute hypoxic respiratory failure s/p trach:   Pulmonary edema:  Bilateral pleural effusions: Unfortunately had not received vaccination for flu, PNA, or COVID-19 PTA.  Explant pathology with NSIP, no malignancy.  PGD 2-3.  Weaned off paralytic 10/19 (for vent  dyssynchrony) and Caroline 10/22.  Initial difficulty weaning sedation given agitation then with neurological findings as below.  CXR initially post-op with pulmonary edema, aggressively diuresed.  Recurring fevers post-op, see ID section below.  Prolonged vent wean s/p trach and PEG/J tube placement with thoracic surgery 10/29.  Bronch 10/30 with thick secretions in the RUL and LLL and mild mucosal erythema, bilateral anastomoses intact.  Code called 11/2 for bradycardia/asystole (required 1 round of CPR, no medications) then progressively hypotensive, GI bleed as below.  Chest CT 11/2 with increased bilateral pleural effusions (moderate left, small right), bibasilar atelectasis with area of hypoenhancing parenchyma in LLL (suspicious for infection), and numerous nondisplaced anterior rib fractures bilaterally.  S/p left chest tube placement in IR 11/3, right deferred given very little effusion on US.  Intermittently tolerating PS trials.  - Nebs: levalbuterol and Mucomyst QID  - Aggressive pulmonary toilet with chest physiotherapy QID  -Cuff leak interfering with pressure support trials and ventilator support.  Recommend consulting thoracic surgery for trach change.  -Trach dome trials as tolerated.  Rest on CMV between trials.  - Diuresis per ICU team  - Chest x-ray, 11/13, reviewed by me, bibasilar opacities, not significantly changed from previous. Mild increase in interstitial markings diffusely. Unchanged from previous.       Immunosuppression: s/p induction therapy with basiliximab 10/16 (and high dose IV steroid) and 10/20  - Tacrolimus goal level 8-12.  Current tacrolimus dose 2 mg twice daily. Level 11/11 therapeutic at 10, no dose adjustment, repeat level 11/14 (ordered).  - MMF 1000 mg BID (11/2, decreased given GI bleed, AZA to be avoided given TPMT)  - Prednisolone 12.5 mg BID, next taper due 11/21 (not yet ordered).     Date AM dose (mg) PM dose (mg)   11/7/21 12.5 12.5   11/21/21 12.5 10   12/5/21 10 10    1/2/22 10 7.5   1/30/22 7.5 7.5   2/27/22 7.5 5   3/27/22 5 5   4/24/22 5 2.5      Prophylaxis:   - Bactrim for PJP ppx (held 11/2-11/5 d/t BRENNAN)  - Nystatin for oral candidiasis ppx, 6 month course  - See below for serologies and viral ppx:    Donor Recipient Intervention   CMV status Negative Negative None, CMV monthly (ordered 11/16)   EBV status Positive Positive None, EBV monthly (ordered 11/16)   HSV status N/A Positive Acyclovir POD #1-30 through 11/15 (ordered)  (recent infection history pre-txp)      ID: Concern for possible Strongyloides exposure pre-transplant s/p ivermectin x1 dose (9/17).  Donor and recipient cultures NGTD.  S/p IV ceftazidime/vancomycin for 48h per protocol and additional empiric ceftazidime 10/19-10/23 given recurrent fevers.  Bronch cultures 10/17 with Staph epi.  Cryptococcal Ag negative 10/23.  Worsening leukocytosis 11/10.  - Blood cultures (11/10) NGTD  - Bacterial sputum culture (11/10) with Klebsiella pneumoniae, widely sensitive, change from ertapenem to ceftriaxone (11/13)  - Monthly Coccidioides Ag x12 months post-transplant per ID (urine and serum negative 10/17), due 11/17 (ordered)     Recurring fevers, Resolved:  Fevers post-op, Tmax 101.7 POD #1.  Febrile with worsening leukocytosis again 10/25, generally persisting.  Trach sputum culture (10/25) with GPC, normal jean marie.  LP (10/29), xanthochromic with pleocytosis thought to be appropriate given RBC and WBCs, no ABX recommended per transplant ID and neurology.  Bronch culture (10/30) with GPC in clusters, normal jean marie.  S/p empiric meropenem 10/28-11/2.  Trach sputum culture (11/2) with Staph epi, treatment not indicated per transplant ID.  Now afebrile.     Disseminated Candida: Noted on blood cultures 10/20 and 10/22.  BDG fungitell positive (399) on 10/20.  Respiratory cultures with persistent Candida albicans.  TC 10/23 without evidence of endocarditis.  Ophthalmology consult 10/24 with benign dilated fundoscopic  exam.  Candida empyema also noted 10/25, chest tubes inadvertently removed by CVTS 10/28, left chest tube replaced by IR 11/3 as above with ongoing Candida on cultures.  Most recent blood culture 11/2 negative.  - Fluconazole (10/26-11/25 per transplant ID, prior micafungin 10/22-10/27), repeat EKG for QTc monitoring 11/16 (ordered), LFTs as below (see transplant ID note 11/5, will need repeat imaging to ensure left pleural effusion almost entirely resolved before removing chest tube and concluding fluconazole)      HSV: Chronic intermittent active infection pre-transplant with recent HSV infection: crusted lesions throughout left side of jaw, s/p 10 day treatment course of ACV through 10/9.  HSV PCR blood negative 10/17.  - ACV ppx as above (started POD #1 instead of POD #8 given HSV history and location)     Hypogammaglobulinemia: IgG previously low at 364 (9/7).  Noted at 265 at time of transplant, s/p IVIG with premedication 10/21.    - Repeat IgG 11/17 (ordered)     Positive cross match: Note that he received two doses of rituximab in June, which is likely contributing to cross match result.  DSA most recently negative 11/1.  - DSA monitoring q2 weeks (11/15, ordered)     PHS risk criteria donor:  Additional labs required post-transplant (between 4-8 weeks post-op): Hepatitis B, Hepatitis C, and HIV by VISHAL (ordered 11/15).     SALTY: Noted pre-transplant.  Home CPAP 6-12 cm H2O.  - Resume BiPAP after decannulation.     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  MRI 10/23 with multifocal subacute infarcts within both cerebral hemispheres and left cerebellum.  DDx include surgery v embolic v infectious.  Heparin drip started 10/23 (intermittently  held with GI bleed as below).  Repeat stroke code 10/25 d/t marked decrease in responsiveness with sedation wean, pupil inequality, and absent gag reflex.  CTA head without obvious new pathology, MRI brain (with and without contrast) primarily revealing for infarct, low likelihood of PRES.  Ammonia normal.  VEEG per neuro with severe diffuse encephalopathy.  Gradual improvement noted since 10/29, repeat head CT 11/2 (following code) without new acute intracranial abnormalities.  - AC management per primary team as below  -Daily PT and OT for strengthening and conditioning.     Afib with RVR: Noted 10/18, started on amiodarone drip and transitioned to PO 10/21, dose decreased 10/29 (anticipate 4 week course).  Currently in SR.  Heparin drip as above.  Metoprolol resumed 11/3.     Right subclavian DVT: Seen on UE US from 11/4.  Heparin drip resumed 11/5.        Recurrent GI bleed: Hemoglobin dropped to 6.6 10/22, s/p 2 units pRBC.  OG tube with bloody output, EGD 10/23 noted NJ/OG tube trauma with scant oozing.  Progressive hypotension 11/2, hemoglobin 5.8 with bloody G tube output.  Heparin drip held, transitioned to PPI drip, and MTP activated.  EGD 11/2 with large amount of clotted blood in stomach and area of raised mucosa with small adherent clot near PEG tube site that was clipped, no active bleeding.  Abdominal CT 11/2 with stomach distended with blood products and dilated small bowel.  Maroon stools 11/3, repeat EGD with extensive old blood in stomach, no active bleeding, small nodular area with prior clips clipped again.  Most recent EGD 11/5 with ulcer noted at PEG tube bumper site, gastritis, and suction marks from G tube, and GJ arm is in the duodenal bulb.  Large amount of TF noted in G tube drainage 11/7.  AXR 11/9 with GJ tube tip projecting over proximal duodenum.  GI exchanged GJ tube 11/9.  - Management (including AC) per GI and primary team     BRENNAN, Improving:   Hyperkalemia, Resolved: Baseline  creatinine 0.7.  BRENNAN noted POD #1, UO also downtrending.  Improved with aggressive diuresis, Cr worsened again (1.38 on 11/2) along with up trending BUN and hyperkalemia.  Nephrology consulted 11/1, thought to be prerenal/component of ATN.  Now improving.     Elevated LFTs, Resolved: Shock liver post-op.  Initial ALT//230 evening of surgery, then with marked increase to 1550/1246 POD #1.  Had improved although now with acute rise again likely in setting of shock.     We appreciate the excellent care provided by the CVICU and CVTS teams.  Recommendations communicated via in person rounding and this note.  Will continue to follow along closely, please do not hesitate to call with any questions or concerns.    Abiodun Woods MD  [phone]            Interval History:   Trach cuff leaking. Patient anxious per nursing.  Trach dome X 1h twice yesterday, stopped due to dyspnea. Mild dyspnea on the vent.  Cough: none   Sputum:  Moderate clear-white sputum  Hemoptysis: none   Chest Pain: Incisional, adequately controlled with current medication           Review of Systems:   C: NEGATIVE for fever, chills  INTEGUMENTARY/SKIN: no rash or obvious new lesions  ENT/MOUTH: no sore throat, new sinus pain or nasal drainage  RESP: see interval history  CV: NEGATIVE for  palpitations or peripheral edema  GI: no nausea, vomiting, change in stools  : metzger  MUSCULOSKELETAL: no myalgias, arthralgias, diffusely weak            Medications:       acetylcysteine  2 mL Nebulization 4x Daily     acyclovir  400 mg Oral or J tube BID     amiodarone  200 mg Oral or Feeding Tube Daily     banatrol plus  1 packet Per Feeding Tube BID     calcium carbonate 600 mg-vitamin D 400 units  1 tablet Oral or Feeding Tube BID w/meals     docusate  100 mg Oral Daily     ertapenem (INVanz) IV  1 g Intravenous Q24H     escitalopram  5 mg Oral or Feeding Tube Daily     fluconazole  400 mg Intravenous Q24H     heparin lock flush  5-20 mL Intracatheter  Q24H     insulin glargine  30 Units Subcutaneous Daily     levothyroxine  25 mcg Oral Daily     lidocaine  2 patch Transdermal Q24H     lidocaine   Transdermal Q8H     melatonin  10 mg Oral QPM     metoprolol tartrate  75 mg Oral or Feeding Tube BID     multivitamins w/minerals  15 mL Per Feeding Tube Daily     mycophenolate  1,000 mg Per J Tube BID     nystatin  1,000,000 Units Swish & Swallow 4x Daily     pantoprazole  40 mg Per Feeding Tube BID     potassium chloride  40 mEq Oral or Feeding Tube Daily     prednisoLONE  12.5 mg Per J Tube 2 times daily     protein modular  1 packet Per Feeding Tube TID     rosuvastatin  10 mg Oral or Feeding Tube Daily     sennosides  10 mL Oral Daily     sodium chloride (PF)  10-40 mL Intracatheter Q8H     sodium chloride (PF)  3 mL Intracatheter Q8H     sulfamethoxazole-trimethoprim  10 mL Oral or J tube Daily    Or     sulfamethoxazole-trimethoprim  1 tablet Oral or Feeding Tube Daily     tacrolimus  2 mg Oral QPM     tacrolimus  2 mg Oral QAM     acetaminophen, bisacodyl, dextrose, dextrose, glucose **OR** dextrose **OR** glucagon, heparin lock flush, hydrALAZINE, hydrOXYzine **OR** hydrOXYzine, labetalol, levalbuterol, lidocaine, magnesium hydroxide, naloxone **OR** naloxone **OR** naloxone **OR** naloxone, ondansetron **OR** ondansetron, oxymetazoline, prochlorperazine **OR** prochlorperazine, BETA BLOCKER NOT PRESCRIBED, senna-docusate, sodium chloride (PF), sodium chloride (PF)         Physical Exam:   Temp:  [96.8  F (36  C)-98  F (36.7  C)] 97.6  F (36.4  C)  Pulse:  [65-90] 82  Resp:  [16-22] 18  BP: ()/(56-88) 125/76  FiO2 (%):  [30 %-40 %] 30 %  SpO2:  [90 %-100 %] 97 %    Intake/Output Summary (Last 24 hours) at 11/13/2021 0743  Last data filed at 11/13/2021 0700  Gross per 24 hour   Intake 2378.63 ml   Output 2895 ml   Net -516.37 ml     Constitutional:   Awake, alert and in no apparent distress     Eyes:   Nonicteric, left pupil slightly larger than right      ENT:    oral mucosa moist without lesions     Neck:   Trach/vent     Lungs:   Mildly diminished air flow.  Scattered left crackles. No rhonchi.  No wheezes.     Cardiovascular:   Normal S1 and S2.  RRR.  No murmur. No gallop. No rub.     Abdomen:   NABS, soft, nondistended, nontender.  No HSM.     Musculoskeletal:   No edema     Neurologic:   Alert and conversant.  Diffusely weak, radha RUE     Skin:   Warm, dry.  No rash on limited exam.             Data:   All laboratory and imaging data reviewed.    Results for orders placed or performed during the hospital encounter of 09/05/21 (from the past 24 hour(s))   Glucose by meter   Result Value Ref Range    GLUCOSE BY METER POCT 146 (H) 70 - 99 mg/dL   Glucose by meter   Result Value Ref Range    GLUCOSE BY METER POCT 71 70 - 99 mg/dL   Heparin Unfractionated Anti Xa Level   Result Value Ref Range    Anti Xa Unfractionated Heparin 0.16 For Reference Range, See Comment IU/mL    Narrative    Therapeutic Range: UFH: 0.25-0.50 IU/mL for low intensity dosing,  0.30-0.70 IU/mL for high intensity dosing DVT and PE.  This test is not validated for other direct factor X inhibitors (e.g. rivaroxaban, apixaban, edoxaban, betrixaban, fondaparinux) and should not be used for monitoring of other medications.   Glucose by meter   Result Value Ref Range    GLUCOSE BY METER POCT 129 (H) 70 - 99 mg/dL   Glucose by meter   Result Value Ref Range    GLUCOSE BY METER POCT 124 (H) 70 - 99 mg/dL   Extra Tube ([place] Draw)    Narrative    The following orders were created for panel order Extra Tube ([place] Draw).  Procedure                               Abnormality         Status                     ---------                               -----------         ------                     Extra Blue Top Tube[[id number]]                              Final result                 Please view results for these tests on the individual orders.   Extra Blue Top Tube   Result Value Ref Range    Hold  Specimen JI    Glucose by meter   Result Value Ref Range    GLUCOSE BY METER POCT 136 (H) 70 - 99 mg/dL   Basic metabolic panel   Result Value Ref Range    Sodium 140 133 - 144 mmol/L    Potassium 4.4 3.4 - 5.3 mmol/L    Chloride 108 94 - 109 mmol/L    Carbon Dioxide (CO2) 27 20 - 32 mmol/L    Anion Gap 5 3 - 14 mmol/L    Urea Nitrogen 34 (H) 7 - 30 mg/dL    Creatinine 0.58 (L) 0.66 - 1.25 mg/dL    Calcium 8.5 8.5 - 10.1 mg/dL    Glucose 156 (H) 70 - 99 mg/dL    GFR Estimate >90 >60 mL/min/1.73m2   Cell count with differential fluid - BAL Site 1    Narrative    The following orders were created for panel order Cell count with differential fluid - BAL Site 1.  Procedure                               Abnormality         Status                     ---------                               -----------         ------                     Cell Count Body Fluid[[id number]]        Abnormal            Final result               Differential Body Fluid[[id number]]                          Final result                 Please view results for these tests on the individual orders.   Gram stain - BAL Site 1    Specimen: Lung, Right; Bronchial Alveolar Lavage   Result Value Ref Range    Gram Stain Result <25 PMNs/low power field     Gram Stain Result No organisms seen    Acid-Fast Bacilli Culture and Stain    Specimen: Lung, Right; Bronchial Alveolar Lavage    Narrative    The following orders were created for panel order Acid-Fast Bacilli Culture and Stain.  Procedure                               Abnormality         Status                     ---------                               -----------         ------                     Acid-Fast Bacilli Cultur...[[id number]]                      In process                   Please view results for these tests on the individual orders.   Koh prep - BAL Site 1    Specimen: Lung, Right; Bronchial Alveolar Lavage   Result Value Ref Range    KOH Preparation No fungal elements seen     KOH  Preparation Reference Range: No fungal elements seen.    Cell Count Body Fluid   Result Value Ref Range    Color Colorless Colorless, Yellow    Clarity Cloudy (A) Clear, Bloody    Total Nucleated Cells 367 /uL    Narrative    No reference ranges have been established.  This result  should be interpreted in the context of the patient's clinical condition and   compared to simultaneous measurement in the patient's blood.         Differential Body Fluid   Result Value Ref Range    % Neutrophils 43 %    % Lymphocytes 1 %    % Monocyte/Macrophages      % Eosinophils 1 %    % Other Cells 56 %    Narrative    No reference ranges have been established. This result should be interpreted in the context of the patient's clinical condition and compared to simultaneous measurement in the patient's blood.   Glucose by meter   Result Value Ref Range    GLUCOSE BY METER POCT 139 (H) 70 - 99 mg/dL   Heparin Unfractionated Anti Xa Level   Result Value Ref Range    Anti Xa Unfractionated Heparin 0.37 For Reference Range, See Comment IU/mL    Narrative    Therapeutic Range: UFH: 0.25-0.50 IU/mL for low intensity dosing,  0.30-0.70 IU/mL for high intensity dosing DVT and PE.  This test is not validated for other direct factor X inhibitors (e.g. rivaroxaban, apixaban, edoxaban, betrixaban, fondaparinux) and should not be used for monitoring of other medications.   Glucose by meter   Result Value Ref Range    GLUCOSE BY METER POCT 171 (H) 70 - 99 mg/dL   Heparin Unfractionated Anti Xa Level   Result Value Ref Range    Anti Xa Unfractionated Heparin 0.32 For Reference Range, See Comment IU/mL    Narrative    Therapeutic Range: UFH: 0.25-0.50 IU/mL for low intensity dosing,  0.30-0.70 IU/mL for high intensity dosing DVT and PE.  This test is not validated for other direct factor X inhibitors (e.g. rivaroxaban, apixaban, edoxaban, betrixaban, fondaparinux) and should not be used for monitoring of other medications.   Glucose by meter    Result Value Ref Range    GLUCOSE BY METER POCT 168 (H) 70 - 99 mg/dL   Basic metabolic panel   Result Value Ref Range    Sodium 140 133 - 144 mmol/L    Potassium 4.1 3.4 - 5.3 mmol/L    Chloride 106 94 - 109 mmol/L    Carbon Dioxide (CO2) 31 20 - 32 mmol/L    Anion Gap 3 3 - 14 mmol/L    Urea Nitrogen 44 (H) 7 - 30 mg/dL    Creatinine 0.61 (L) 0.66 - 1.25 mg/dL    Calcium 8.6 8.5 - 10.1 mg/dL    Glucose 190 (H) 70 - 99 mg/dL    GFR Estimate >90 >60 mL/min/1.73m2   CBC with platelets   Result Value Ref Range    WBC Count 16.4 (H) 4.0 - 11.0 10e3/uL    RBC Count 2.97 (L) 4.40 - 5.90 10e6/uL    Hemoglobin 9.0 (L) 13.3 - 17.7 g/dL    Hematocrit 29.0 (L) 40.0 - 53.0 %    MCV 98 78 - 100 fL    MCH 30.3 26.5 - 33.0 pg    MCHC 31.0 (L) 31.5 - 36.5 g/dL    RDW 17.4 (H) 10.0 - 15.0 %    Platelet Count 169 150 - 450 10e3/uL   Comprehensive metabolic panel   Result Value Ref Range    Sodium 140 133 - 144 mmol/L    Potassium 4.1 3.4 - 5.3 mmol/L    Chloride 106 94 - 109 mmol/L    Carbon Dioxide (CO2) 31 20 - 32 mmol/L    Anion Gap 3 3 - 14 mmol/L    Urea Nitrogen 44 (H) 7 - 30 mg/dL    Creatinine 0.61 (L) 0.66 - 1.25 mg/dL    Calcium 8.6 8.5 - 10.1 mg/dL    Glucose 190 (H) 70 - 99 mg/dL    Alkaline Phosphatase 144 40 - 150 U/L    AST 16 0 - 45 U/L    ALT 33 0 - 70 U/L    Protein Total 5.0 (L) 6.8 - 8.8 g/dL    Albumin 2.0 (L) 3.4 - 5.0 g/dL    Bilirubin Total 0.3 0.2 - 1.3 mg/dL    GFR Estimate >90 >60 mL/min/1.73m2   INR   Result Value Ref Range    INR 1.10 0.85 - 1.15   Magnesium   Result Value Ref Range    Magnesium 1.6 1.6 - 2.3 mg/dL   Phosphorus   Result Value Ref Range    Phosphorus 4.3 2.5 - 4.5 mg/dL   XR Chest Port 1 View    Narrative    EXAMINATION:  XR CHEST PORT 1 VIEW 11/13/2021 6:53 AM.    COMPARISON: 11/12/2021.    HISTORY:  s/p lung transplant    FINDINGS: Portable AP view of the chest. Left PICC tip projects over  the superior cavoatrial junction. Tracheostomy catheter projects over  the mid trachea. Stable  position of the left basilar pigtail chest  tube. Clamshell median sternotomy wires. Cardiomediastinal silhouette  is stable. Unchanged small bilateral pleural effusions and patchy  bibasilar pulmonary opacities. No pneumothorax. Upper abdomen is  unremarkable.      Impression    IMPRESSION:   Stable postsurgical chest with small bilateral pleural effusions and  patchy bibasilar pulmonary opacities. Bilateral lung transplantation  again noted.    I have personally reviewed the examination and initial interpretation  and I agree with the findings.    JODI MENDEZ MD         SYSTEM ID:  [id number]

## 2021-11-13 NOTE — PROGRESS NOTES
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 Corral. 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  UGIB causing code blue on 11/2.     OVERNIGHT EVENTS:  - Discomfort and air hunger with new cuff leak on trach    TODAY'S PROGRESS:   - Trach dome  - Diurese   - Start Ceftriaxone  - Stop Ertapenam    PLAN:  Neuro/ pain/ sedation:  Acute postoperative pain   Anxiety and depression  Acute to subacute CVA, b/l cerebral hemispheres and L cerebellum, suspect embolic  Encephalopathy and diffuse weakness - improving slightly   R ear lateral canal floor excoriation with bleeding - resolved   - APAP and oxycodone prn, lido patch   - PTA lexapro    Pulmonary care:   SALTY on home CPAP  Acute hypoxic respiratory failure s/p b/l lung transplant 10/16/21  S/p tracheostomy 10/29  Empyema with Candida s/p chest tube 11/3/21  - mechanically ventilated via trach, PST, trach dome  - Levalbuterol nebs and Mucomyst, chest PT  - Daily CXR  - appreciate Pulmonary  - Diurese  - Thoracic for trach exchange today    Cardiovascular:    HTN  Afib   PFO  Vasoplegic shock in setting of hypovolemia - resolved  - Monitor hemodynamic status.   - MAP goal <100, SBP <140  - metoprolol 75 BID    - prn labetalol  - statin  - Amiodarone 200 mg daily (end date 11/27/21)  - low-intensity heparin gtt     GI care:   Elevated LFTs - recurrent  GI bleed 2/2 NG trauma, now recurrent secondary to GJ bumper ulcer   Moderate malnutrition in the context of acute on chronic  illness  PEG-J, with malpositioned J tube   - Diet: TF   - PPI bid    Fluids/ Electrolytes/ Nutrition:   Acute kidney injury, recurrent, now with azotemia - resolving   Hypernatremia  Hyperkalemia, resolved   BL creat appears to be ~ 0.7  - free water flushes  - Strict I/O, daily weights  - Avoid/limit nephrotoxins as able  - Replete lytes PRN per protocol   - Lasix 20 mg x2    Endocrine:    Stress induced hyperglycemia with steroid-induced component, on DM2  Hypothyroidism  RA  Preop A1c 6.6  - Discontinue insulin gtt. Medium sliding scale insulin    - Goal BG <180 for optimal healing  - continue home synthroid  - lantus 30    ID/ Antibiotics:  Immunosuppression s/p transplant  Candidal infection of donor lungs (likely source), pleural fluid, and fungemia   - NGTD CSF. Last + blood cx 10/22. No vegetations on valves per TC 10/23  - Nystatin, Acyclovir, bactrim   - Tacrolimus (via g tube), prednisone    - appreciate transplant ID, ophthalmology    - d/w ID, no indication to treat Staph epi in sputum   - Fluconazole (end date 11/25)  - Stop ertapenem, Ceftriaxone for klebsiella PNA     Heme:     Acute blood loss anemia secondary to surgery and GI bleed, and anemia related to critical illness and iron deficiency    Hypogammaglobulinemia s/p IVIG  Thrombocytopenia, HIT negative - resolved   Lactic acidosis, resolved  Nonocclusive R subclavian thrombus    - heparin low intensity      MSK/ Skin:  Clamshell surgical incision  - Sternal precautions  - Postoperative incision management per protocol  - PT/OT/CR     Prophylaxis:    - Mechanical prophylaxis for DVT: SCDs  - Chemical DVT prophylaxis: heparin low intensity gtt    - PPI      Lines/ tubes/ drains:  - trach  - peg-j  - Watson 10/25  - PIVs  - chest tube L  - L PICC      Disposition:  - [facility]    Patient seen, findings and plan discussed with CV ICU staff    Tammie Perez MD  Surgery PGY-3    ====================================    SUBJECTIVE:   Laying in bed with  trach, responds to voice and moves all extremities    OBJECTIVE:   1. VITAL SIGNS:   Temp:  [96.8  F (36  C)-98  F (36.7  C)] 97.7  F (36.5  C)  Pulse:  [68-90] 79  Resp:  [18-23] 23  BP: ()/(56-88) 117/78  FiO2 (%):  [30 %-40 %] 30 %  SpO2:  [94 %-100 %] 96 %  Ventilation Mode: (S) CMV/AC  (Continuous Mandatory Ventilation/ Assist Control)  FiO2 (%): 30 %  Rate Set (breaths/minute): 12 breaths/min  Tidal Volume Set (mL): 400 mL  PEEP (cm H2O): 5 cmH2O  Pressure Support (cm H2O): 10 cmH2O  Oxygen Concentration (%): 40 %  Resp: 23      2. INTAKE/ OUTPUT:   I/O last 3 completed shifts:  In: 2379.13 [I.V.:669.13; NG/GT:720]  Out: 2895 [Urine:2645; Emesis/NG output:130; Chest Tube:120]    3. PHYSICAL EXAMINATION:   General: Propped up in bed, sick appearing individual  Neuro: responds to voice moves all extremities  Resp: Trach and ventilated  CV: RRR on tele  Abdomen: Soft, Non-distended, Non-tender  Incisions: c/d/i  Extremities: warm and well perfused    4. INVESTIGATIONS:   Arterial Blood Gases   Recent Labs   Lab 11/11/21  1740   PH 7.48*   PCO2 37   PO2 106*   HCO3 28     Complete Blood Count   Recent Labs   Lab 11/13/21  0340 11/12/21  0316 11/11/21  0322 11/10/21  0329   WBC 16.4* 12.1* 13.4* 21.6*   HGB 9.0* 8.5* 8.9* 10.2*    162 189 273     Basic Metabolic Panel  Recent Labs   Lab 11/13/21  0811 11/13/21  0340 11/13/21  0242 11/12/21  2026 11/12/21  1757 11/12/21  1546 11/12/21  0322 11/12/21  0316 11/11/21  1612 11/11/21  1508   NA  --  140  140  --   --   --  140  --  141  141  --  146*   POTASSIUM  --  4.1  4.1  --   --   --  4.4  --  4.2  4.2  --  3.9   CHLORIDE  --  106  106  --   --   --  108  --  108  108  --  112*   CO2  --  31  31  --   --   --  27  --  28  28  --  25   BUN  --  44*  44*  --   --   --  34*  --  40*  40*  --  36*   CR  --  0.61*  0.61*  --   --   --  0.58*  --  0.58*  0.58*  --  0.52*   * 190*  190* 168* 171*   < > 156*   < > 196*  196*   < > 180*     < > = values in this interval not displayed.     Liver Function Tests  Recent Labs   Lab 11/13/21  0340 11/12/21 0316 11/11/21  0322 11/10/21  0325   AST 16 13 16 20   ALT 33 34 39 52   ALKPHOS 144 138 143 167*   BILITOTAL 0.3 0.2 0.3 0.4   ALBUMIN 2.0* 1.7* 1.9* 2.0*   INR 1.10 1.10 1.12 1.12     Pancreatic Enzymes  No lab results found in last 7 days.  Coagulation Profile  Recent Labs   Lab 11/13/21  0340 11/12/21 0316 11/11/21  0322 11/10/21  0325   INR 1.10 1.10 1.12 1.12         5. RADIOLOGY:   Recent Results (from the past 24 hour(s))   XR Chest Port 1 View    Narrative    EXAMINATION:  XR CHEST PORT 1 VIEW 11/13/2021 6:53 AM.    COMPARISON: 11/12/2021.    HISTORY:  s/p lung transplant    FINDINGS: Portable AP view of the chest. Left PICC tip projects over  the superior cavoatrial junction. Tracheostomy catheter projects over  the mid trachea. Stable position of the left basilar pigtail chest  tube. Clamshell median sternotomy wires. Cardiomediastinal silhouette  is stable. Unchanged small bilateral pleural effusions and patchy  bibasilar pulmonary opacities. No pneumothorax. Upper abdomen is  unremarkable.      Impression    IMPRESSION:   Stable postsurgical chest with small bilateral pleural effusions and  patchy bibasilar pulmonary opacities. Bilateral lung transplantation  again noted.    I have personally reviewed the examination and initial interpretation  and I agree with the findings.    JODI MENDEZ MD         SYSTEM ID:  [id number]       =========================================

## 2021-11-13 NOTE — PLAN OF CARE
Major Shift Events:  Vitals stable, neuros unchanged. Pt said he was anxious. Trach cuff leaking badly throughout night and pt complains that it makes breathing hard. Low output out of G-tube and chest tube. Good uop. 1 BM. Refused glucose checks, so insulin drip off all night.  Plan: Wean vent and fix cuff leak.  For vital signs and complete assessments, please see documentation flowsheets.

## 2021-11-13 NOTE — PROGRESS NOTES
Thoracic Surgery progress note    Asked to reassess pt for new cuff leak     Pt c/o increasing difficulty breathing.    Cuff leak audible at bedside despite fully inflated cuff, on both CMV and CPAP. Cuff overinflated with no change and no symptomatic relief per pt.  Trach is still original 6 Shiley placed in OR 10/29.    Otherwise awake, alert  RRR tele   nondistended abd  Up in chair     Bedside trach exchange today - now has an 8 Shiley tracheostomy tube.  Still with audible air after exchange. Will follow clinically.     D/w fellow Dr. Taylor Allan MD  Surgery Resident PGY-3  Pg [phone]

## 2021-11-13 NOTE — PROGRESS NOTES
Major Shift Events:  PT short of breath this morning on both pressure support and full ventilator settings. His cough leak was worsening, so his trach was exchanged for a larger size. Since then he has had no cough leak and is comfortable on 10/5 PS. Unable to trach dome today. Continuing to have loose bowel movements. Watson removed and replaced with external catheter. Vitally stable.   Plan: Encourage rehab. Plan for trach dome as long as tolerated tomorrow.   For vital signs and complete assessments, please see documentation flowsheets.

## 2021-11-14 ENCOUNTER — APPOINTMENT (OUTPATIENT)
Dept: GENERAL RADIOLOGY | Facility: CLINIC | Age: 56
End: 2021-11-14
Attending: STUDENT IN AN ORGANIZED HEALTH CARE EDUCATION/TRAINING PROGRAM
Payer: COMMERCIAL

## 2021-11-14 LAB
ALBUMIN SERPL-MCNC: 2.1 G/DL (ref 3.4–5)
ALP SERPL-CCNC: 160 U/L (ref 40–150)
ALT SERPL W P-5'-P-CCNC: 33 U/L (ref 0–70)
ANION GAP SERPL CALCULATED.3IONS-SCNC: 4 MMOL/L (ref 3–14)
ANION GAP SERPL CALCULATED.3IONS-SCNC: 5 MMOL/L (ref 3–14)
ANION GAP SERPL CALCULATED.3IONS-SCNC: 5 MMOL/L (ref 3–14)
AST SERPL W P-5'-P-CCNC: 18 U/L (ref 0–45)
BASE EXCESS BLDV CALC-SCNC: 5.8 MMOL/L (ref -7.7–1.9)
BILIRUB SERPL-MCNC: 0.2 MG/DL (ref 0.2–1.3)
BRONCHOSCOPY: NORMAL
BUN SERPL-MCNC: 40 MG/DL (ref 7–30)
BUN SERPL-MCNC: 47 MG/DL (ref 7–30)
BUN SERPL-MCNC: 47 MG/DL (ref 7–30)
CALCIUM SERPL-MCNC: 8.7 MG/DL (ref 8.5–10.1)
CALCIUM SERPL-MCNC: 8.8 MG/DL (ref 8.5–10.1)
CALCIUM SERPL-MCNC: 8.8 MG/DL (ref 8.5–10.1)
CHLORIDE BLD-SCNC: 105 MMOL/L (ref 94–109)
CHLORIDE BLD-SCNC: 105 MMOL/L (ref 94–109)
CHLORIDE BLD-SCNC: 107 MMOL/L (ref 94–109)
CO2 SERPL-SCNC: 29 MMOL/L (ref 20–32)
CO2 SERPL-SCNC: 30 MMOL/L (ref 20–32)
CO2 SERPL-SCNC: 30 MMOL/L (ref 20–32)
CREAT SERPL-MCNC: 0.54 MG/DL (ref 0.66–1.25)
CREAT SERPL-MCNC: 0.63 MG/DL (ref 0.66–1.25)
CREAT SERPL-MCNC: 0.63 MG/DL (ref 0.66–1.25)
ERYTHROCYTE [DISTWIDTH] IN BLOOD BY AUTOMATED COUNT: 17.4 % (ref 10–15)
GFR SERPL CREATININE-BSD FRML MDRD: >90 ML/MIN/1.73M2
GLUCOSE BLD-MCNC: 172 MG/DL (ref 70–99)
GLUCOSE BLD-MCNC: 172 MG/DL (ref 70–99)
GLUCOSE BLD-MCNC: 179 MG/DL (ref 70–99)
GLUCOSE BLDC GLUCOMTR-MCNC: 126 MG/DL (ref 70–99)
GLUCOSE BLDC GLUCOMTR-MCNC: 132 MG/DL (ref 70–99)
GLUCOSE BLDC GLUCOMTR-MCNC: 149 MG/DL (ref 70–99)
GLUCOSE BLDC GLUCOMTR-MCNC: 162 MG/DL (ref 70–99)
GLUCOSE BLDC GLUCOMTR-MCNC: 162 MG/DL (ref 70–99)
GLUCOSE BLDC GLUCOMTR-MCNC: 177 MG/DL (ref 70–99)
HCO3 BLDV-SCNC: 31 MMOL/L (ref 21–28)
HCT VFR BLD AUTO: 31.6 % (ref 40–53)
HGB BLD-MCNC: 9.9 G/DL (ref 13.3–17.7)
INR PPP: 1.17 (ref 0.85–1.15)
MAGNESIUM SERPL-MCNC: 1.5 MG/DL (ref 1.6–2.3)
MCH RBC QN AUTO: 30.5 PG (ref 26.5–33)
MCHC RBC AUTO-ENTMCNC: 31.3 G/DL (ref 31.5–36.5)
MCV RBC AUTO: 97 FL (ref 78–100)
O2/TOTAL GAS SETTING VFR VENT: 30 %
OXYHGB MFR BLDV: 68 % (ref 70–75)
PCO2 BLDV: 47 MM HG (ref 40–50)
PH BLDV: 7.43 [PH] (ref 7.32–7.43)
PHOSPHATE SERPL-MCNC: 4.2 MG/DL (ref 2.5–4.5)
PLATELET # BLD AUTO: 176 10E3/UL (ref 150–450)
PO2 BLDV: 38 MM HG (ref 25–47)
POTASSIUM BLD-SCNC: 4.4 MMOL/L (ref 3.4–5.3)
POTASSIUM BLD-SCNC: 4.4 MMOL/L (ref 3.4–5.3)
POTASSIUM BLD-SCNC: 4.9 MMOL/L (ref 3.4–5.3)
PROT SERPL-MCNC: 5.6 G/DL (ref 6.8–8.8)
RBC # BLD AUTO: 3.25 10E6/UL (ref 4.4–5.9)
SODIUM SERPL-SCNC: 140 MMOL/L (ref 133–144)
TACROLIMUS BLD-MCNC: 9.1 UG/L (ref 5–15)
TME LAST DOSE: NORMAL H
TME LAST DOSE: NORMAL H
UFH PPP CHRO-ACNC: 0.39 IU/ML
WBC # BLD AUTO: 16.9 10E3/UL (ref 4–11)

## 2021-11-14 PROCEDURE — 250N000013 HC RX MED GY IP 250 OP 250 PS 637

## 2021-11-14 PROCEDURE — 94640 AIRWAY INHALATION TREATMENT: CPT

## 2021-11-14 PROCEDURE — 250N000013 HC RX MED GY IP 250 OP 250 PS 637: Performed by: STUDENT IN AN ORGANIZED HEALTH CARE EDUCATION/TRAINING PROGRAM

## 2021-11-14 PROCEDURE — 250N000011 HC RX IP 250 OP 636: Performed by: THORACIC SURGERY (CARDIOTHORACIC VASCULAR SURGERY)

## 2021-11-14 PROCEDURE — 80048 BASIC METABOLIC PNL TOTAL CA: CPT

## 2021-11-14 PROCEDURE — 999N000157 HC STATISTIC RCP TIME EA 10 MIN

## 2021-11-14 PROCEDURE — 94003 VENT MGMT INPAT SUBQ DAY: CPT

## 2021-11-14 PROCEDURE — 250N000013 HC RX MED GY IP 250 OP 250 PS 637: Performed by: ANESTHESIOLOGY

## 2021-11-14 PROCEDURE — 250N000009 HC RX 250: Performed by: STUDENT IN AN ORGANIZED HEALTH CARE EDUCATION/TRAINING PROGRAM

## 2021-11-14 PROCEDURE — 31645 BRNCHSC W/THER ASPIR 1ST: CPT | Performed by: INTERNAL MEDICINE

## 2021-11-14 PROCEDURE — 94640 AIRWAY INHALATION TREATMENT: CPT | Mod: 76

## 2021-11-14 PROCEDURE — 99233 SBSQ HOSP IP/OBS HIGH 50: CPT | Mod: 25 | Performed by: INTERNAL MEDICINE

## 2021-11-14 PROCEDURE — 250N000011 HC RX IP 250 OP 636: Performed by: STUDENT IN AN ORGANIZED HEALTH CARE EDUCATION/TRAINING PROGRAM

## 2021-11-14 PROCEDURE — 85027 COMPLETE CBC AUTOMATED: CPT | Performed by: THORACIC SURGERY (CARDIOTHORACIC VASCULAR SURGERY)

## 2021-11-14 PROCEDURE — 82040 ASSAY OF SERUM ALBUMIN: CPT | Performed by: THORACIC SURGERY (CARDIOTHORACIC VASCULAR SURGERY)

## 2021-11-14 PROCEDURE — 85520 HEPARIN ASSAY: CPT | Performed by: THORACIC SURGERY (CARDIOTHORACIC VASCULAR SURGERY)

## 2021-11-14 PROCEDURE — 80069 RENAL FUNCTION PANEL: CPT

## 2021-11-14 PROCEDURE — 200N000002 HC R&B ICU UMMC

## 2021-11-14 PROCEDURE — 250N000013 HC RX MED GY IP 250 OP 250 PS 637: Performed by: NURSE PRACTITIONER

## 2021-11-14 PROCEDURE — 999N000065 XR CHEST PORT 1 VIEW

## 2021-11-14 PROCEDURE — 85610 PROTHROMBIN TIME: CPT | Performed by: THORACIC SURGERY (CARDIOTHORACIC VASCULAR SURGERY)

## 2021-11-14 PROCEDURE — 258N000003 HC RX IP 258 OP 636: Performed by: THORACIC SURGERY (CARDIOTHORACIC VASCULAR SURGERY)

## 2021-11-14 PROCEDURE — 94668 MNPJ CHEST WALL SBSQ: CPT

## 2021-11-14 PROCEDURE — 250N000009 HC RX 250

## 2021-11-14 PROCEDURE — 80197 ASSAY OF TACROLIMUS: CPT | Performed by: PHYSICIAN ASSISTANT

## 2021-11-14 PROCEDURE — 31622 DX BRONCHOSCOPE/WASH: CPT

## 2021-11-14 PROCEDURE — 71045 X-RAY EXAM CHEST 1 VIEW: CPT | Mod: 26 | Performed by: RADIOLOGY

## 2021-11-14 PROCEDURE — 250N000012 HC RX MED GY IP 250 OP 636 PS 637: Performed by: INTERNAL MEDICINE

## 2021-11-14 PROCEDURE — 250N000011 HC RX IP 250 OP 636

## 2021-11-14 PROCEDURE — 250N000012 HC RX MED GY IP 250 OP 636 PS 637: Performed by: PHYSICIAN ASSISTANT

## 2021-11-14 PROCEDURE — 83735 ASSAY OF MAGNESIUM: CPT | Performed by: THORACIC SURGERY (CARDIOTHORACIC VASCULAR SURGERY)

## 2021-11-14 PROCEDURE — 31615 TRCHEOBRNCHSC EST TRACHS INC: CPT | Performed by: INTERNAL MEDICINE

## 2021-11-14 PROCEDURE — 250N000013 HC RX MED GY IP 250 OP 250 PS 637: Performed by: THORACIC SURGERY (CARDIOTHORACIC VASCULAR SURGERY)

## 2021-11-14 PROCEDURE — 82805 BLOOD GASES W/O2 SATURATION: CPT | Performed by: STUDENT IN AN ORGANIZED HEALTH CARE EDUCATION/TRAINING PROGRAM

## 2021-11-14 PROCEDURE — 84100 ASSAY OF PHOSPHORUS: CPT | Performed by: THORACIC SURGERY (CARDIOTHORACIC VASCULAR SURGERY)

## 2021-11-14 PROCEDURE — 250N000012 HC RX MED GY IP 250 OP 636 PS 637: Performed by: NURSE PRACTITIONER

## 2021-11-14 RX ORDER — FENTANYL CITRATE 50 UG/ML
INJECTION, SOLUTION INTRAMUSCULAR; INTRAVENOUS
Status: COMPLETED
Start: 2021-11-14 | End: 2021-11-14

## 2021-11-14 RX ORDER — MAGNESIUM SULFATE HEPTAHYDRATE 40 MG/ML
2 INJECTION, SOLUTION INTRAVENOUS ONCE
Status: COMPLETED | OUTPATIENT
Start: 2021-11-14 | End: 2021-11-14

## 2021-11-14 RX ORDER — DEXTROSE MONOHYDRATE 25 G/50ML
25-50 INJECTION, SOLUTION INTRAVENOUS
Status: DISCONTINUED | OUTPATIENT
Start: 2021-11-14 | End: 2021-11-14

## 2021-11-14 RX ORDER — NICOTINE POLACRILEX 4 MG
15-30 LOZENGE BUCCAL
Status: DISCONTINUED | OUTPATIENT
Start: 2021-11-14 | End: 2021-11-14

## 2021-11-14 RX ORDER — FENTANYL CITRATE 50 UG/ML
25 INJECTION, SOLUTION INTRAMUSCULAR; INTRAVENOUS ONCE
Status: COMPLETED | OUTPATIENT
Start: 2021-11-14 | End: 2021-11-14

## 2021-11-14 RX ORDER — FENTANYL CITRATE 50 UG/ML
100-400 INJECTION, SOLUTION INTRAMUSCULAR; INTRAVENOUS
Status: COMPLETED | OUTPATIENT
Start: 2021-11-14 | End: 2021-11-14

## 2021-11-14 RX ADMIN — TACROLIMUS 2 MG: 5 CAPSULE ORAL at 17:24

## 2021-11-14 RX ADMIN — PREDNISOLONE 12.5 MG: 15 SOLUTION ORAL at 19:47

## 2021-11-14 RX ADMIN — NYSTATIN 1000000 UNITS: 500000 SUSPENSION ORAL at 09:00

## 2021-11-14 RX ADMIN — ACYCLOVIR 400 MG: 200 SUSPENSION ORAL at 08:59

## 2021-11-14 RX ADMIN — HYDROXYZINE HYDROCHLORIDE 50 MG: 50 TABLET, FILM COATED ORAL at 13:11

## 2021-11-14 RX ADMIN — FENTANYL CITRATE 200 MCG: 50 INJECTION, SOLUTION INTRAMUSCULAR; INTRAVENOUS at 09:04

## 2021-11-14 RX ADMIN — Medication 40 MG: at 08:59

## 2021-11-14 RX ADMIN — NYSTATIN 1000000 UNITS: 500000 SUSPENSION ORAL at 11:05

## 2021-11-14 RX ADMIN — ONDANSETRON 4 MG: 2 INJECTION INTRAMUSCULAR; INTRAVENOUS at 02:59

## 2021-11-14 RX ADMIN — ROSUVASTATIN CALCIUM 10 MG: 10 TABLET, FILM COATED ORAL at 09:03

## 2021-11-14 RX ADMIN — SULFAMETHOXAZOLE AND TRIMETHOPRIM 80 MG: 200; 40 SUSPENSION ORAL at 08:59

## 2021-11-14 RX ADMIN — Medication 40 MG: at 19:46

## 2021-11-14 RX ADMIN — ESCITALOPRAM 5 MG: 5 TABLET, FILM COATED ORAL at 08:59

## 2021-11-14 RX ADMIN — NYSTATIN 1000000 UNITS: 500000 SUSPENSION ORAL at 19:47

## 2021-11-14 RX ADMIN — FLUCONAZOLE IN SODIUM CHLORIDE 400 MG: 2 INJECTION, SOLUTION INTRAVENOUS at 12:14

## 2021-11-14 RX ADMIN — TOPICAL ANESTHETIC 0.5 ML: 200 SPRAY DENTAL; PERIODONTAL at 09:05

## 2021-11-14 RX ADMIN — LEVALBUTEROL HYDROCHLORIDE 1.25 MG: 1.25 SOLUTION RESPIRATORY (INHALATION) at 12:01

## 2021-11-14 RX ADMIN — INSULIN ASPART 1 UNITS: 100 INJECTION, SOLUTION INTRAVENOUS; SUBCUTANEOUS at 23:12

## 2021-11-14 RX ADMIN — Medication 10 MG: at 19:47

## 2021-11-14 RX ADMIN — HYDROXYZINE HYDROCHLORIDE 50 MG: 50 TABLET, FILM COATED ORAL at 02:59

## 2021-11-14 RX ADMIN — Medication 1 PACKET: at 13:11

## 2021-11-14 RX ADMIN — Medication 1 PACKET: at 08:59

## 2021-11-14 RX ADMIN — MIDAZOLAM 3 MG: 1 INJECTION INTRAMUSCULAR; INTRAVENOUS at 09:05

## 2021-11-14 RX ADMIN — Medication 1 PACKET: at 19:46

## 2021-11-14 RX ADMIN — LEVALBUTEROL HYDROCHLORIDE 1.25 MG: 1.25 SOLUTION RESPIRATORY (INHALATION) at 15:54

## 2021-11-14 RX ADMIN — MYCOPHENOLATE MOFETIL 1000 MG: 200 POWDER, FOR SUSPENSION ORAL at 08:59

## 2021-11-14 RX ADMIN — PREDNISOLONE 12.5 MG: 15 SOLUTION ORAL at 08:59

## 2021-11-14 RX ADMIN — NYSTATIN 1000000 UNITS: 500000 SUSPENSION ORAL at 15:39

## 2021-11-14 RX ADMIN — ACETYLCYSTEINE 2 ML: 200 SOLUTION ORAL; RESPIRATORY (INHALATION) at 07:57

## 2021-11-14 RX ADMIN — METOPROLOL TARTRATE 75 MG: 25 TABLET, FILM COATED ORAL at 19:47

## 2021-11-14 RX ADMIN — MULTIVIT AND MINERALS-FERROUS GLUCONATE 9 MG IRON/15 ML ORAL LIQUID 15 ML: at 09:03

## 2021-11-14 RX ADMIN — CALCIUM CARBONATE 600 MG (1,500 MG)-VITAMIN D3 400 UNIT TABLET 1 TABLET: at 17:24

## 2021-11-14 RX ADMIN — ACYCLOVIR 400 MG: 200 SUSPENSION ORAL at 19:46

## 2021-11-14 RX ADMIN — MYCOPHENOLATE MOFETIL 1000 MG: 200 POWDER, FOR SUSPENSION ORAL at 19:46

## 2021-11-14 RX ADMIN — INSULIN ASPART 1 UNITS: 100 INJECTION, SOLUTION INTRAVENOUS; SUBCUTANEOUS at 15:39

## 2021-11-14 RX ADMIN — TACROLIMUS 2 MG: 5 CAPSULE ORAL at 08:59

## 2021-11-14 RX ADMIN — FENTANYL CITRATE 25 MCG: 50 INJECTION, SOLUTION INTRAMUSCULAR; INTRAVENOUS at 01:38

## 2021-11-14 RX ADMIN — ACETYLCYSTEINE 2 ML: 200 SOLUTION ORAL; RESPIRATORY (INHALATION) at 21:08

## 2021-11-14 RX ADMIN — ACETYLCYSTEINE 2 ML: 200 SOLUTION ORAL; RESPIRATORY (INHALATION) at 12:01

## 2021-11-14 RX ADMIN — MAGNESIUM SULFATE IN WATER 2 G: 40 INJECTION, SOLUTION INTRAVENOUS at 04:13

## 2021-11-14 RX ADMIN — METOPROLOL TARTRATE 75 MG: 25 TABLET, FILM COATED ORAL at 09:03

## 2021-11-14 RX ADMIN — INSULIN GLARGINE 30 UNITS: 100 INJECTION, SOLUTION SUBCUTANEOUS at 11:06

## 2021-11-14 RX ADMIN — LEVALBUTEROL HYDROCHLORIDE 1.25 MG: 1.25 SOLUTION RESPIRATORY (INHALATION) at 07:57

## 2021-11-14 RX ADMIN — HEPARIN SODIUM 1100 UNITS/HR: 1000 INJECTION INTRAVENOUS; SUBCUTANEOUS at 01:51

## 2021-11-14 RX ADMIN — POTASSIUM CHLORIDE 40 MEQ: 40 SOLUTION ORAL at 09:03

## 2021-11-14 RX ADMIN — CALCIUM CARBONATE 600 MG (1,500 MG)-VITAMIN D3 400 UNIT TABLET 1 TABLET: at 09:04

## 2021-11-14 RX ADMIN — CEFTRIAXONE SODIUM 2 G: 2 INJECTION, POWDER, FOR SOLUTION INTRAMUSCULAR; INTRAVENOUS at 09:03

## 2021-11-14 RX ADMIN — LEVOTHYROXINE SODIUM 25 MCG: 0.03 TABLET ORAL at 09:03

## 2021-11-14 RX ADMIN — INSULIN ASPART 1 UNITS: 100 INJECTION, SOLUTION INTRAVENOUS; SUBCUTANEOUS at 09:33

## 2021-11-14 RX ADMIN — AMIODARONE HYDROCHLORIDE 200 MG: 200 TABLET ORAL at 09:03

## 2021-11-14 RX ADMIN — ACETYLCYSTEINE 2 ML: 200 SOLUTION ORAL; RESPIRATORY (INHALATION) at 15:54

## 2021-11-14 ASSESSMENT — ACTIVITIES OF DAILY LIVING (ADL)
ADLS_ACUITY_SCORE: 23
ADLS_ACUITY_SCORE: 25
ADLS_ACUITY_SCORE: 23
ADLS_ACUITY_SCORE: 25
ADLS_ACUITY_SCORE: 23
ADLS_ACUITY_SCORE: 25
ADLS_ACUITY_SCORE: 23
ADLS_ACUITY_SCORE: 25
ADLS_ACUITY_SCORE: 23
ADLS_ACUITY_SCORE: 25
ADLS_ACUITY_SCORE: 23

## 2021-11-14 NOTE — PROGRESS NOTES
Lung Transplant Consult Follow Up Note   November 14, 2021            Assessment and Plan:   Edson Thornton is a 56 year old male with a PMH significant for NSIP/ILD, bronchiectasis, moderate PH, RA, SALTY, chronic HSV infection, hypogammaglobulinemia, steroid-induced diabetes, hypothyroidism, PFO, HTN, HLD, duodenal anomaly, anxiety, and depression.  Admitted on 9/5/21 from OSH for acute on chronic respiratory failure 2/2 ILD exacerbation, now s/p BSLT on 10/16/21.  Prolonged vent wean s/p trach and PEG/J tube placement with thoracic surgery 10/29.  Post-op course otherwise complicated by encephalopathy and diffuse weakness, acute to subacute CVA, afib with RVR, BRENNAN, GI bleed, Candidemia/Candida empyema, and recurrent fevers.  Code called 11/2 for bradycardia/asystole and progressively hypotensive, found to have acute drop in hemoglobin and bloody G tube output. Gradual improvement in mentation and weakness.      Today's recommendations:   - Bronchoscopy today to evaluate cuff leak/trach problems, assess airways and pulmonary toilet.  -Trach dome trials as tolerated.  Rest on CMV between trach dome trials.  -Tacrolimus level is therapeutic at 9.1.  Continue current dose and q3d monitoring (ordered).  - Diuresis per ICU team  - Prednisolone taper due 11/21 (not yet ordered)  - CMV and EBV ordered 11/16.  - Continue acyclovir through 11/15.  - Follow pending blood cultures (11/10).  - Sputum culture (11/10) with Klebsiella pneumoniae, widely sensitive, continue ceftriaxone.  - Coccidioides Ag ordered 11/17.  - Continue fluconazole through 11/25, EKG weekly (11/16, ordered) for QTc monitoring.  - IgG ordered 11/17.  - DSA ordered 11/15.  - PHS high risk donor risk labs ordered 11/15.  - Monitor LFTs q2-3d     S/p BSLT for ILD:  Acute hypoxic respiratory failure s/p trach:   Pulmonary edema:  Bilateral pleural effusions: Unfortunately had not received vaccination for flu, PNA, or COVID-19 PTA.  Explant pathology with  NSIP, no malignancy.  PGD 2-3.  Weaned off paralytic 10/19 (for vent dyssynchrony) and Caroline 10/22.  Initial difficulty weaning sedation given agitation then with neurological findings as below.  CXR initially post-op with pulmonary edema, aggressively diuresed.  Recurring fevers post-op, see ID section below.  Prolonged vent wean s/p trach and PEG/J tube placement with thoracic surgery 10/29.  Bronch 10/30 with thick secretions in the RUL and LLL and mild mucosal erythema, bilateral anastomoses intact.  Code called 11/2 for bradycardia/asystole (required 1 round of CPR, no medications) then progressively hypotensive, GI bleed as below.  Chest CT 11/2 with increased bilateral pleural effusions (moderate left, small right), bibasilar atelectasis with area of hypoenhancing parenchyma in LLL (suspicious for infection), and numerous nondisplaced anterior rib fractures bilaterally.  S/p left chest tube placement in IR 11/3, right deferred given very little effusion on US.  Intermittently tolerating PS trials.  Problems with tracheostomy cuff leak in the past 24 hours (11/14).  Tracheostomy tube was changed approximately 5 times in the past 24 hours.  -Bronchoscopy today with thoracic surgeon to evaluate tracheostomy/cuff leak, assess airways and provide pulmonary toilet.  (Addendum: Tracheostomy appears to be in good position with the cuff well sealed in the trachea, small to moderate secretions mostly in the lower lobes, suctioned clear.)  - Nebs: levalbuterol and Mucomyst QID  - Aggressive pulmonary toilet with chest physiotherapy QID  -Trach dome trials as tolerated.  Rest on CMV between trials.  - Diuresis per ICU team  Chest x-ray, 11/14, elevated right hemidiaphragm right basilar haziness not significantly changed from previous.        Immunosuppression: s/p induction therapy with basiliximab 10/16 (and high dose IV steroid) and 10/20  - Tacrolimus goal level 8-12.    Tacrolimus level therapeutic at 9.1 (11/14)  continue monitoring every 3 days (ordered).  - MMF 1000 mg BID (11/2, decreased given GI bleed, AZA to be avoided given TPMT)  - Prednisolone 12.5 mg BID, next taper due 11/21 (not yet ordered).     Date AM dose (mg) PM dose (mg)   11/7/21 12.5 12.5   11/21/21 12.5 10   12/5/21 10 10   1/2/22 10 7.5   1/30/22 7.5 7.5   2/27/22 7.5 5   3/27/22 5 5   4/24/22 5 2.5      Prophylaxis:   - Bactrim for PJP ppx (held 11/2-11/5 d/t BRENNAN)  - Nystatin for oral candidiasis ppx, 6 month course  - See below for serologies and viral ppx:    Donor Recipient Intervention   CMV status Negative Negative None, CMV monthly (ordered 11/16)   EBV status Positive Positive None, EBV monthly (ordered 11/16)   HSV status N/A Positive Acyclovir POD #1-30 through 11/15 (ordered)  (recent infection history pre-txp)      ID: Concern for possible Strongyloides exposure pre-transplant s/p ivermectin x1 dose (9/17).  Donor and recipient cultures NGTD.  S/p IV ceftazidime/vancomycin for 48h per protocol and additional empiric ceftazidime 10/19-10/23 given recurrent fevers.  Bronch cultures 10/17 with Staph epi.  Cryptococcal Ag negative 10/23.  Worsening leukocytosis 11/10.  - Blood cultures (11/10) NGTD  - Bacterial sputum culture (11/10) with Klebsiella pneumoniae, widely sensitive, changed from ertapenem to ceftriaxone (11/13)  - Monthly Coccidioides Ag x12 months post-transplant per ID (urine and serum negative 10/17), due 11/17 (ordered)     Recurring fevers, Resolved:  Fevers post-op, Tmax 101.7 POD #1.  Febrile with worsening leukocytosis again 10/25, generally persisting.  Trach sputum culture (10/25) with GPC, normal jean marie.  LP (10/29), xanthochromic with pleocytosis thought to be appropriate given RBC and WBCs, no ABX recommended per transplant ID and neurology.  Bronch culture (10/30) with GPC in clusters, normal jean marie.  S/p empiric meropenem 10/28-11/2.  Trach sputum culture (11/2) with Staph epi, treatment not indicated per transplant ID.   Now afebrile.     Disseminated Candida: Noted on blood cultures 10/20 and 10/22.  BDG fungitell positive (399) on 10/20.  Respiratory cultures with persistent Candida albicans.  TC 10/23 without evidence of endocarditis.  Ophthalmology consult 10/24 with benign dilated fundoscopic exam.  Candida empyema also noted 10/25, chest tubes inadvertently removed by CVTS 10/28, left chest tube replaced by IR 11/3 as above with ongoing Candida on cultures.  Most recent blood culture 11/2 negative.  - Fluconazole (10/26-11/25 per transplant ID, prior micafungin 10/22-10/27), repeat EKG for QTc monitoring 11/16 (ordered), LFTs as below (see transplant ID note 11/5, will need repeat imaging to ensure left pleural effusion almost entirely resolved before removing chest tube and concluding fluconazole)      HSV: Chronic intermittent active infection pre-transplant with recent HSV infection: crusted lesions throughout left side of jaw, s/p 10 day treatment course of ACV through 10/9.  HSV PCR blood negative 10/17.  - ACV ppx as above (started POD #1 instead of POD #8 given HSV history and location)     Hypogammaglobulinemia: IgG previously low at 364 (9/7).  Noted at 265 at time of transplant, s/p IVIG with premedication 10/21.    - Repeat IgG 11/17 (ordered)     Positive cross match: Note that he received two doses of rituximab in June, which is likely contributing to cross match result.  DSA most recently negative 11/1.  - DSA monitoring q2 weeks (11/15, ordered)     PHS risk criteria donor:  Additional labs required post-transplant (between 4-8 weeks post-op): Hepatitis B, Hepatitis C, and HIV by VISHAL (ordered 11/15).     SALTY: Noted pre-transplant.  Home CPAP 6-12 cm H2O.  - Resume BiPAP after decannulation.     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  MRI 10/23 with multifocal subacute infarcts within both cerebral hemispheres and left cerebellum.  DDx include surgery v embolic v infectious.  Heparin drip started 10/23 (intermittently held with GI bleed as below).  Repeat stroke code 10/25 d/t marked decrease in responsiveness with sedation wean, pupil inequality, and absent gag reflex.  CTA head without obvious new pathology, MRI brain (with and without contrast) primarily revealing for infarct, low likelihood of PRES.  Ammonia normal.  VEEG per neuro with severe diffuse encephalopathy.  Gradual improvement noted since 10/29, repeat head CT 11/2 (following code) without new acute intracranial abnormalities.  - AC management per primary team as below  -Daily PT and OT for strengthening and conditioning.     Afib with RVR: Noted 10/18, started on amiodarone drip and transitioned to PO 10/21, dose decreased 10/29 (anticipate 4 week course).  Currently in SR.  Heparin drip as above.  Metoprolol resumed 11/3.     Right subclavian DVT: Seen on UE US from 11/4.  Heparin drip resumed 11/5.        Recurrent GI bleed: Hemoglobin dropped to 6.6 10/22, s/p 2 units pRBC.  OG tube with bloody output, EGD 10/23 noted NJ/OG tube trauma with scant oozing.  Progressive hypotension 11/2, hemoglobin 5.8 with bloody G tube output.  Heparin drip held, transitioned to PPI drip, and MTP activated.  EGD 11/2 with large amount of clotted blood in stomach and area of raised mucosa with small adherent clot near PEG tube site that was clipped, no active bleeding.  Abdominal CT 11/2 with stomach distended with blood products and dilated small bowel.  Maroon stools 11/3, repeat EGD with extensive old blood in stomach, no active bleeding, small nodular area with prior clips clipped again.  Most recent EGD 11/5 with ulcer noted at PEG tube bumper site, gastritis, and suction marks from G tube, and  GJ arm is in the duodenal bulb.  Large amount of TF noted in G tube drainage 11/7.  AXR 11/9 with GJ tube tip projecting over proximal duodenum.  GI exchanged GJ tube 11/9.  - Management (including AC) per GI and primary team     BRENNAN, Improving:   Hyperkalemia, Resolved: Baseline creatinine 0.7.  BRENNAN noted POD #1, UO also downtrending.  Improved with aggressive diuresis, Cr worsened again (1.38 on 11/2) along with up trending BUN and hyperkalemia.  Nephrology consulted 11/1, thought to be prerenal/component of ATN.  Now resolved.     Elevated LFTs: Shock liver post-op.  Initial ALT//230 evening of surgery, then with marked increase to 1550/1246 POD #1.    - 11/14 Transaminases are stable but alk phos intermittently increasing. Continue monitoring, likely medication related.        We appreciate the excellent care provided by the CVICU and CVTS teams.  Recommendations communicated via in person rounding and this note.  Will continue to follow along closely, please do not hesitate to call with any questions or concerns.     Abiodun Woods MD  [phone]              Interval History:   Tracheostomy cuff leak yesterday.  Multiple trach changes in the past 24 hours with cuff leak or inadequate seal.  Difficulty with ventilator support due to tracheostomy issues.  No trach dome trials yesterday.  Dyspnea at rest: Mild  Sputum: Minimal secretions  Hemoptysis: None  Chest Pain: Incisional, adequately controlled with current medication             Review of Systems:   C: NEGATIVE for fever, chills  INTEGUMENTARY/SKIN: no rash or obvious new lesions  ENT/MOUTH: no sore throat, new sinus pain or nasal drainage  RESP: see interval history  CV: NEGATIVE for chest pain, palpitations or peripheral edema  GI: no nausea, vomiting, change in stools  : Watson removed, requiring straight cath so Watson replaced  MUSCULOSKELETAL: no myalgias, arthralgias            Medications:       acetylcysteine  2 mL Nebulization 4x Daily      acyclovir  400 mg Oral or J tube BID     amiodarone  200 mg Oral or Feeding Tube Daily     banatrol plus  1 packet Per Feeding Tube BID     calcium carbonate 600 mg-vitamin D 400 units  1 tablet Oral or Feeding Tube BID w/meals     cefTRIAXone  2 g Intravenous Q24H     docusate  100 mg Oral Daily     escitalopram  5 mg Oral or Feeding Tube Daily     fluconazole  400 mg Intravenous Q24H     heparin lock flush  5-20 mL Intracatheter Q24H     insulin aspart  1-6 Units Subcutaneous Q4H     insulin glargine  30 Units Subcutaneous Daily     levothyroxine  25 mcg Oral Daily     lidocaine  2 patch Transdermal Q24H     lidocaine   Transdermal Q8H     melatonin  10 mg Oral QPM     metoprolol tartrate  75 mg Oral or Feeding Tube BID     multivitamins w/minerals  15 mL Per Feeding Tube Daily     mycophenolate  1,000 mg Per J Tube BID     nystatin  1,000,000 Units Swish & Swallow 4x Daily     pantoprazole  40 mg Per Feeding Tube BID     potassium chloride  40 mEq Oral or Feeding Tube Daily     prednisoLONE  12.5 mg Per J Tube 2 times daily     protein modular  1 packet Per Feeding Tube TID     rosuvastatin  10 mg Oral or Feeding Tube Daily     sennosides  10 mL Oral Daily     sodium chloride (PF)  10-40 mL Intracatheter Q8H     sodium chloride (PF)  3 mL Intracatheter Q8H     sulfamethoxazole-trimethoprim  10 mL Oral or J tube Daily    Or     sulfamethoxazole-trimethoprim  1 tablet Oral or Feeding Tube Daily     tacrolimus  2 mg Oral QPM     tacrolimus  2 mg Oral QAM     acetaminophen, bisacodyl, dextrose, dextrose, glucose **OR** dextrose **OR** glucagon, heparin lock flush, hydrALAZINE, hydrOXYzine **OR** hydrOXYzine, labetalol, levalbuterol, lidocaine, magnesium hydroxide, naloxone **OR** naloxone **OR** naloxone **OR** naloxone, ondansetron **OR** ondansetron, oxymetazoline, prochlorperazine **OR** prochlorperazine, senna-docusate, sodium chloride (PF), sodium chloride (PF)         Physical Exam:   Temp:  [97.7  F (36.5   C)-98.8  F (37.1  C)] 98.8  F (37.1  C)  Pulse:  [67-90] 85  Resp:  [16-24] 20  BP: (106-159)/(66-98) 124/87  FiO2 (%):  [30 %-70 %] 70 %  SpO2:  [88 %-100 %] 100 %    Intake/Output Summary (Last 24 hours) at 11/14/2021 0747  Last data filed at 11/14/2021 0700  Gross per 24 hour   Intake 2629 ml   Output 1515 ml   Net 1114 ml     Constitutional:   Awake, alert and in no apparent distress     Eyes:   nonicteric, left pupil slightly larger than right, both are reactive     ENT:    oral mucosa moist without lesions     Neck:   Tracheostomy/ventilator     Lungs:   Good air flow.  No crackles. No rhonchi.  No wheezes.     Cardiovascular:   Normal S1 and S2.  RRR.  No murmur. No gallop. No rub.     Abdomen:   NABS, soft, nondistended, nontender.  No HSM.     Musculoskeletal:   No edema     Neurologic:   Alert and conversant.     Skin:   Warm, dry.  No rash on limited exam.             Data:   All laboratory and imaging data reviewed.    Results for orders placed or performed during the hospital encounter of 09/05/21 (from the past 24 hour(s))   Glucose by meter   Result Value Ref Range    GLUCOSE BY METER POCT 136 (H) 70 - 99 mg/dL   Glucose by meter   Result Value Ref Range    GLUCOSE BY METER POCT 140 (H) 70 - 99 mg/dL   XR Chest Port 1 View    Narrative    EXAM: XR CHEST PORT 1 VIEW  11/13/2021 3:18 PM     HISTORY:  picc       COMPARISON:  Chest x-ray from same date at 0638 hours    FINDINGS:   Frontal radiograph of the chest. Post surgical changes of bilateral  lung transplant with intact clamshell sternotomy wires. Percutaneous  tracheostomy tube projects over the mid thoracic trachea. Stable  position of left basilar chest tube. Left arm PICC line has been  retracted with the tip now in the innominate vein.    Trachea is midline. Stable cardiac silhouette. Grossly unchanged  perihilar and bibasilar opacities. No significant pleural effusion. No  pneumothorax.      Impression    IMPRESSION:   1. Retracted left PICC  line with the tip now in the left innominate  vein. Remaining support devices are stable.  2. Unchanged perihilar and bibasilar opacities, likely atelectasis and  edema.    I have personally reviewed the examination and initial interpretation  and I agree with the findings.    ARIES DELGADO MD         SYSTEM ID:  [id number]   Basic metabolic panel   Result Value Ref Range    Sodium 142 133 - 144 mmol/L    Potassium 4.3 3.4 - 5.3 mmol/L    Chloride 106 94 - 109 mmol/L    Carbon Dioxide (CO2) 29 20 - 32 mmol/L    Anion Gap 7 3 - 14 mmol/L    Urea Nitrogen 42 (H) 7 - 30 mg/dL    Creatinine 0.68 0.66 - 1.25 mg/dL    Calcium 9.0 8.5 - 10.1 mg/dL    Glucose 184 (H) 70 - 99 mg/dL    GFR Estimate >90 >60 mL/min/1.73m2   Glucose by meter   Result Value Ref Range    GLUCOSE BY METER POCT 156 (H) 70 - 99 mg/dL   Glucose by meter   Result Value Ref Range    GLUCOSE BY METER POCT 130 (H) 70 - 99 mg/dL   XR Chest Port 1 View    Narrative    EXAMINATION:  XR CHEST PORT 1 VIEW 11/13/2021 10:56 PM.    COMPARISON: 11/13/2021.    HISTORY:  looking at enastemosis site, trach cuff keeps getting popped    FINDINGS: Tracheostomy tube tip projects over the mid trachea.  Surgical changes of lung transplant with stable left basilar pigtail  chest tube. Left PICC tip projects over the superior cavoatrial  junction. The heart size is within normal limits. Pulmonary  vasculature is indistinct. No significant change in the mixed  perihilar and bibasilar pulmonary opacities. Small bilateral pleural  effusions. No pneumothorax.      Impression    IMPRESSION: Bilateral lung transplant   1. Tracheostomy tube tip projects over the mid trachea, similar to  prior.  2. No significant change in the perihilar and bibasilar pulmonary  opacities. No pneumothorax.    I have personally reviewed the examination and initial interpretation  and I agree with the findings.    JODI MENDEZ MD         SYSTEM ID:  [id number]   XR Chest Port 1 View     Narrative    EXAM: XR CHEST PORT 1 VW 11/14/2021 1:52 AM    HISTORY: Trach placement    COMPARISON: 11/13/2020    FINDINGS: Portable AP view of the chest. Interval tracheostomy  exchange with new tracheostomy tube tip projecting over the mid  trachea, 5.2 cm above the shiv. Left PICC tip projects over the  superior cavoatrial junction. Surgical changes of lung transplant with  stable left pigtail chest tube. Cardiomediastinal silhouette is  stable. Pulmonary vasculature is indistinct. Small bilateral pleural  effusions. No pneumothorax. Decreased bibasilar predominant pulmonary  opacities. Upper abdomen is unremarkable.      Impression    IMPRESSION: Bilateral lung transplant.   1. Tracheostomy tube exchange with tip projecting over the mid  trachea.  2. Decreased bibasilar predominant pulmonary opacities.     I have personally reviewed the examination and initial interpretation  and I agree with the findings.    JODI MENDEZ MD         SYSTEM ID:  [id number]   Basic metabolic panel   Result Value Ref Range    Sodium 140 133 - 144 mmol/L    Potassium 4.4 3.4 - 5.3 mmol/L    Chloride 105 94 - 109 mmol/L    Carbon Dioxide (CO2) 30 20 - 32 mmol/L    Anion Gap 5 3 - 14 mmol/L    Urea Nitrogen 47 (H) 7 - 30 mg/dL    Creatinine 0.63 (L) 0.66 - 1.25 mg/dL    Calcium 8.8 8.5 - 10.1 mg/dL    Glucose 172 (H) 70 - 99 mg/dL    GFR Estimate >90 >60 mL/min/1.73m2   CBC with platelets   Result Value Ref Range    WBC Count 16.9 (H) 4.0 - 11.0 10e3/uL    RBC Count 3.25 (L) 4.40 - 5.90 10e6/uL    Hemoglobin 9.9 (L) 13.3 - 17.7 g/dL    Hematocrit 31.6 (L) 40.0 - 53.0 %    MCV 97 78 - 100 fL    MCH 30.5 26.5 - 33.0 pg    MCHC 31.3 (L) 31.5 - 36.5 g/dL    RDW 17.4 (H) 10.0 - 15.0 %    Platelet Count 176 150 - 450 10e3/uL   Comprehensive metabolic panel   Result Value Ref Range    Sodium 140 133 - 144 mmol/L    Potassium 4.4 3.4 - 5.3 mmol/L    Chloride 105 94 - 109 mmol/L    Carbon Dioxide (CO2) 30 20 - 32 mmol/L    Anion Gap 5 3  - 14 mmol/L    Urea Nitrogen 47 (H) 7 - 30 mg/dL    Creatinine 0.63 (L) 0.66 - 1.25 mg/dL    Calcium 8.8 8.5 - 10.1 mg/dL    Glucose 172 (H) 70 - 99 mg/dL    Alkaline Phosphatase 160 (H) 40 - 150 U/L    AST 18 0 - 45 U/L    ALT 33 0 - 70 U/L    Protein Total 5.6 (L) 6.8 - 8.8 g/dL    Albumin 2.1 (L) 3.4 - 5.0 g/dL    Bilirubin Total 0.2 0.2 - 1.3 mg/dL    GFR Estimate >90 >60 mL/min/1.73m2   INR   Result Value Ref Range    INR 1.17 (H) 0.85 - 1.15   Magnesium   Result Value Ref Range    Magnesium 1.5 (L) 1.6 - 2.3 mg/dL   Phosphorus   Result Value Ref Range    Phosphorus 4.2 2.5 - 4.5 mg/dL   Heparin Unfractionated Anti Xa Level   Result Value Ref Range    Anti Xa Unfractionated Heparin 0.39 For Reference Range, See Comment IU/mL    Narrative    Therapeutic Range: UFH: 0.25-0.50 IU/mL for low intensity dosing,  0.30-0.70 IU/mL for high intensity dosing DVT and PE.  This test is not validated for other direct factor X inhibitors (e.g. rivaroxaban, apixaban, edoxaban, betrixaban, fondaparinux) and should not be used for monitoring of other medications.   Glucose by meter   Result Value Ref Range    GLUCOSE BY METER POCT 177 (H) 70 - 99 mg/dL   Blood gas venous with oxyhemoglobin   Result Value Ref Range    pH Venous 7.43 7.32 - 7.43    pCO2 Venous 47 40 - 50 mm Hg    pO2 Venous 38 25 - 47 mm Hg    Bicarbonate Venous 31 (H) 21 - 28 mmol/L    FIO2 30     Oxyhemoglobin Venous 68 (L) 70 - 75 %    Base Excess/Deficit (+/-) 5.8 (H) -7.7 - 1.9 mmol/L

## 2021-11-14 NOTE — PROGRESS NOTES
"Bronchoscopy Risk Assessment Guidelines      A. Patient symptoms to consider when assessing pulmonary TB risk are:    I. Cough greater than 3 weeks; and fever, hemoptysis, pleuritic chest    pain, weight loss greater than 10 lbs, night sweats, fatigue, infiltrates on    upper lobes or superior segments of lower lobes, cavitation on chest    x-ray.   B. Patient risk factors to consider when assessing pulmonary TB risk are:    I. Exposure to known TB case, foreign-born persons (within 5 years of    arrival to [place]), residence in a crowded setting (correctional facility,     long-term care center, etc.), persons with HIV or immunosuppression.    Patients with symptoms and risk factors should generally be considered \"suspect risk\" and bronchoscopies should be performed in airborne precautions.    This patient has NO KNOWN RISK of Tuberculosis (proceed with bronchoscopy)    Specimens sent: no  Complications: None  Scope used: #[id number] Slim  Attending Physician: Dr. Chuck Felipe, RT on 11/14/2021 at 9:33 AM  "

## 2021-11-14 NOTE — PROVIDER NOTIFICATION
CVTS moonlighter paged 3x overnight between 2030 and 0100 for significant air leak around trach and cuff not holding air. Overall, pt underwent three trach exchanges between this time. The first two exchanges happened following turning the patient in bed. The third happened while patient was asleep and had briefly woken up to cough.      1. 2100: SICU resident replaced trach with shiley 8 flex. Old trach was found to have leak in cuff.   2. 2200: CVTS moonlighter replaced trach with shiley 8 flex following another new cuff issue. Old trach was found to have leak in cuff.   3. 0030: CVTS moonlighter replaced trach with Bivona 6. Old trach found to have leak in cuff. No further issues noted at this time.

## 2021-11-14 NOTE — PROGRESS NOTES
11/14/2021    CVICU Moonlighter Note      Called by nursing as cuff on trach was no longer holding air with significant air leak around the tracheostomy. This was the third occurrence with the size 8 Shiley. The trach was exchange for a 6 Bivona tube to place a different balloon at a different depth. No further air leak observed and the balloon appeared to hold pressure.        Obed Serrano   Surgery PGY5

## 2021-11-14 NOTE — PLAN OF CARE
Major Shift Events: Afebrile. Atarax given x2 for anxiety, fent 25mcg given for pain. Trach exchanged 3x this shift for cuff failure to hold air, now bivona 6. See provider notification for more details. Pt switched to CMV following third trach exchange at 0100. At 0530 patient had increased WOB and O2 sats sustained at 88% even after suctioning, FIO2 increased to 70%. MD aware. TF remain at goal. Multiple watery stools overnight, rectal tube placed given output and trach cuff sensitivity to turns. Pt remains due to void after pulling metzger at 1800 yesterday. Bladder scan at 0200 was 100mL. Mag replaced per protocol.   Plan: Monitor and address respiratory status. Manage anxiety. Monitor UOP and encourage voiding. Continue with rehab.   For vital signs and complete assessments, please see documentation flowsheets.

## 2021-11-14 NOTE — PROGRESS NOTES
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 Corral. 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  UGIB causing code blue on 11/2.     OVERNIGHT EVENTS:  - Multiple trach changes; TV loss    TODAY'S PROGRESS:   - Bronch  - TD as able    PLAN:  Neuro/ pain/ sedation:  Acute postoperative pain   Anxiety and depression  Acute to subacute CVA, b/l cerebral hemispheres and L cerebellum, suspect embolic  Encephalopathy and diffuse weakness - improving slightly   R ear lateral canal floor excoriation with bleeding - resolved   - APAP and oxycodone prn, lido patch   - PTA lexapro    Pulmonary care:   SALTY on home CPAP  Acute hypoxic respiratory failure s/p b/l lung transplant 10/16/21  S/p tracheostomy 10/29  Empyema with Candida s/p chest tube 11/3/21  - mechanically ventilated via trach, PST, trach dome  - Levalbuterol nebs and Mucomyst, chest PT  - Daily CXR  - appreciate Pulmonary  - Thoracic for trach exchange today    Cardiovascular:    HTN  Afib   PFO  Vasoplegic shock in setting of hypovolemia - resolved  - Monitor hemodynamic status.   - MAP goal <100, SBP <140  - metoprolol 75 BID    - prn labetalol  - statin  - Amiodarone 200 mg daily (end date 11/27/21)  - low-intensity heparin gtt     GI care:   Elevated LFTs - recurrent  GI bleed 2/2 NG trauma, now recurrent secondary to GJ bumper ulcer   Moderate malnutrition in the context of acute on chronic illness  PEG-J, with malpositioned J tube   - Diet: TF   - PPI  bid    Fluids/ Electrolytes/ Nutrition:   Acute kidney injury, recurrent, now with azotemia - resolving   Hypernatremia  Hyperkalemia, resolved   BL creat appears to be ~ 0.7  - free water flushes  - Strict I/O, daily weights  - Avoid/limit nephrotoxins as able  - Replete lytes PRN per protocol     Endocrine:    Stress induced hyperglycemia with steroid-induced component, on DM2  Hypothyroidism  RA  Preop A1c 6.6  - Discontinue insulin gtt. High sliding scale insulin    - Goal BG <180 for optimal healing  - continue home synthroid  - lantus 30    ID/ Antibiotics:  Immunosuppression s/p transplant  Candidal infection of donor lungs (likely source), pleural fluid, and fungemia   - NGTD CSF. Last + blood cx 10/22. No vegetations on valves per TC 10/23  - Nystatin, Acyclovir, bactrim   - Tacrolimus (via g tube), prednisone    - appreciate transplant ID, ophthalmology    - d/w ID, no indication to treat Staph epi in sputum   - Fluconazole for fungemia (end date 11/25)  - Stop ertapenem, Ceftriaxone for klebsiella PNA     Heme:     Acute blood loss anemia secondary to surgery and GI bleed, and anemia related to critical illness and iron deficiency    Hypogammaglobulinemia s/p IVIG  Thrombocytopenia, HIT negative - resolved   Lactic acidosis, resolved  Nonocclusive R subclavian thrombus    - heparin low intensity      MSK/ Skin:  Clamshell surgical incision  - Sternal precautions  - Postoperative incision management per protocol  - PT/OT/CR     Prophylaxis:    - Mechanical prophylaxis for DVT: SCDs  - Chemical DVT prophylaxis: heparin low intensity gtt    - PPI      Lines/ tubes/ drains:  - trach  - peg-j  - Watson 10/25; replaced 11/14  - PIVs  - chest tube L  - L PICC      Disposition:  - CVICU    Patient seen, findings and plan discussed with CV ICU staff    Tammie Perez MD  Surgery PGY-3    ====================================    SUBJECTIVE:   Laying in bed with trach, responds to voice and moves all  extremities    OBJECTIVE:   1. VITAL SIGNS:   Temp:  [97.7  F (36.5  C)-98.8  F (37.1  C)] 98.8  F (37.1  C)  Pulse:  [67-90] 85  Resp:  [16-24] 20  BP: (106-159)/(66-98) 124/87  FiO2 (%):  [30 %-70 %] 50 %  SpO2:  [88 %-100 %] 100 %  Ventilation Mode: (S) CPAP/PS  (Continuous positive airway pressure with Pressure Support)  FiO2 (%): 50 %  Rate Set (breaths/minute): 12 breaths/min  Tidal Volume Set (mL): 400 mL  PEEP (cm H2O): 5 cmH2O  Pressure Support (cm H2O): 10 cmH2O  Oxygen Concentration (%): 40 %  Resp: 20      2. INTAKE/ OUTPUT:   I/O last 3 completed shifts:  In: 2629 [I.V.:724; NG/GT:825]  Out: 1515 [Urine:1325; Emesis/NG output:150; Chest Tube:40]    3. PHYSICAL EXAMINATION:   General: Propped up in bed, sick appearing individual  Neuro: responds to voice moves all extremities  Resp: Trach and ventilated  CV: RRR on tele  Abdomen: Soft, Non-distended, Non-tender  Incisions: c/d/i  Extremities: warm and well perfused    4. INVESTIGATIONS:   Arterial Blood Gases   Recent Labs   Lab 11/11/21  1740   PH 7.48*   PCO2 37   PO2 106*   HCO3 28     Complete Blood Count   Recent Labs   Lab 11/14/21  0258 11/13/21  0340 11/12/21  0316 11/11/21  0322   WBC 16.9* 16.4* 12.1* 13.4*   HGB 9.9* 9.0* 8.5* 8.9*    169 162 189     Basic Metabolic Panel  Recent Labs   Lab 11/14/21  0307 11/14/21  0258 11/13/21  2100 11/13/21  1522 11/13/21  1521 11/13/21  0811 11/13/21  0340 11/12/21  1757 11/12/21  1546   NA  --  140  140  --   --  142  --  140  140  --  140   POTASSIUM  --  4.4  4.4  --   --  4.3  --  4.1  4.1  --  4.4   CHLORIDE  --  105  105  --   --  106  --  106  106  --  108   CO2  --  30  30  --   --  29  --  31  31  --  27   BUN  --  47*  47*  --   --  42*  --  44*  44*  --  34*   CR  --  0.63*  0.63*  --   --  0.68  --  0.61*  0.61*  --  0.58*   * 172*  172* 130* 156* 184*   < > 190*  190*   < > 156*    < > = values in this interval not displayed.     Liver Function Tests  Recent Labs    Lab 11/14/21  0258 11/13/21  0340 11/12/21 0316 11/11/21  0322   AST 18 16 13 16   ALT 33 33 34 39   ALKPHOS 160* 144 138 143   BILITOTAL 0.2 0.3 0.2 0.3   ALBUMIN 2.1* 2.0* 1.7* 1.9*   INR 1.17* 1.10 1.10 1.12     Pancreatic Enzymes  No lab results found in last 7 days.  Coagulation Profile  Recent Labs   Lab 11/14/21  0258 11/13/21  0340 11/12/21 0316 11/11/21  0322   INR 1.17* 1.10 1.10 1.12         5. RADIOLOGY:   Recent Results (from the past 24 hour(s))   XR Chest Port 1 View    Narrative    EXAM: XR CHEST PORT 1 VIEW  11/13/2021 3:18 PM     HISTORY:  picc       COMPARISON:  Chest x-ray from same date at 0638 hours    FINDINGS:   Frontal radiograph of the chest. Post surgical changes of bilateral  lung transplant with intact clamshell sternotomy wires. Percutaneous  tracheostomy tube projects over the mid thoracic trachea. Stable  position of left basilar chest tube. Left arm PICC line has been  retracted with the tip now in the innominate vein.    Trachea is midline. Stable cardiac silhouette. Grossly unchanged  perihilar and bibasilar opacities. No significant pleural effusion. No  pneumothorax.      Impression    IMPRESSION:   1. Retracted left PICC line with the tip now in the left innominate  vein. Remaining support devices are stable.  2. Unchanged perihilar and bibasilar opacities, likely atelectasis and  edema.    I have personally reviewed the examination and initial interpretation  and I agree with the findings.    ARIES DELGADO MD         SYSTEM ID:  [id number]   XR Chest Port 1 View    Narrative    EXAMINATION:  XR CHEST PORT 1 VIEW 11/13/2021 10:56 PM.    COMPARISON: 11/13/2021.    HISTORY:  looking at enastemosis site, trach cuff keeps getting popped    FINDINGS: Tracheostomy tube tip projects over the mid trachea.  Surgical changes of lung transplant with stable left basilar pigtail  chest tube. Left PICC tip projects over the superior cavoatrial  junction. The heart size is within normal  limits. Pulmonary  vasculature is indistinct. No significant change in the mixed  perihilar and bibasilar pulmonary opacities. Small bilateral pleural  effusions. No pneumothorax.      Impression    IMPRESSION: Bilateral lung transplant   1. Tracheostomy tube tip projects over the mid trachea, similar to  prior.  2. No significant change in the perihilar and bibasilar pulmonary  opacities. No pneumothorax.    I have personally reviewed the examination and initial interpretation  and I agree with the findings.    JODI MENDEZ MD         SYSTEM ID:  [id number]   XR Chest Port 1 View    Narrative    EXAM: XR CHEST PORT 1 VW 11/14/2021 1:52 AM    HISTORY: Trach placement    COMPARISON: 11/13/2020    FINDINGS: Portable AP view of the chest. Interval tracheostomy  exchange with new tracheostomy tube tip projecting over the mid  trachea, 5.2 cm above the shiv. Left PICC tip projects over the  superior cavoatrial junction. Surgical changes of lung transplant with  stable left pigtail chest tube. Cardiomediastinal silhouette is  stable. Pulmonary vasculature is indistinct. Small bilateral pleural  effusions. No pneumothorax. Decreased bibasilar predominant pulmonary  opacities. Upper abdomen is unremarkable.      Impression    IMPRESSION: Bilateral lung transplant.   1. Tracheostomy tube exchange with tip projecting over the mid  trachea.  2. Decreased bibasilar predominant pulmonary opacities.     I have personally reviewed the examination and initial interpretation  and I agree with the findings.    JODI MENDEZ MD         SYSTEM ID:  [id number]       =========================================

## 2021-11-15 ENCOUNTER — APPOINTMENT (OUTPATIENT)
Dept: GENERAL RADIOLOGY | Facility: CLINIC | Age: 56
End: 2021-11-15
Attending: STUDENT IN AN ORGANIZED HEALTH CARE EDUCATION/TRAINING PROGRAM
Payer: COMMERCIAL

## 2021-11-15 ENCOUNTER — APPOINTMENT (OUTPATIENT)
Dept: PHYSICAL THERAPY | Facility: CLINIC | Age: 56
End: 2021-11-15
Attending: STUDENT IN AN ORGANIZED HEALTH CARE EDUCATION/TRAINING PROGRAM
Payer: COMMERCIAL

## 2021-11-15 LAB
ALBUMIN SERPL-MCNC: 2.1 G/DL (ref 3.4–5)
ALP SERPL-CCNC: 150 U/L (ref 40–150)
ALT SERPL W P-5'-P-CCNC: 33 U/L (ref 0–70)
ANION GAP SERPL CALCULATED.3IONS-SCNC: 3 MMOL/L (ref 3–14)
ANION GAP SERPL CALCULATED.3IONS-SCNC: 3 MMOL/L (ref 3–14)
ANION GAP SERPL CALCULATED.3IONS-SCNC: 8 MMOL/L (ref 3–14)
AST SERPL W P-5'-P-CCNC: 16 U/L (ref 0–45)
BACTERIA BLD CULT: NO GROWTH
BACTERIA BLD CULT: NO GROWTH
BACTERIA BRONCH: ABNORMAL
BACTERIA SPEC CULT: ABNORMAL
BACTERIA SPEC CULT: NO GROWTH
BACTERIA SPEC CULT: NO GROWTH
BACTERIA TISS BX CULT: NO GROWTH
BASE EXCESS BLDV CALC-SCNC: 3.3 MMOL/L (ref -7.7–1.9)
BILIRUB SERPL-MCNC: 0.2 MG/DL (ref 0.2–1.3)
BUN SERPL-MCNC: 37 MG/DL (ref 7–30)
BUN SERPL-MCNC: 37 MG/DL (ref 7–30)
BUN SERPL-MCNC: 42 MG/DL (ref 7–30)
CALCIUM SERPL-MCNC: 8.7 MG/DL (ref 8.5–10.1)
CALCIUM SERPL-MCNC: 8.7 MG/DL (ref 8.5–10.1)
CALCIUM SERPL-MCNC: 8.9 MG/DL (ref 8.5–10.1)
CHLORIDE BLD-SCNC: 105 MMOL/L (ref 94–109)
CHLORIDE BLD-SCNC: 106 MMOL/L (ref 94–109)
CHLORIDE BLD-SCNC: 106 MMOL/L (ref 94–109)
CO2 SERPL-SCNC: 26 MMOL/L (ref 20–32)
CO2 SERPL-SCNC: 31 MMOL/L (ref 20–32)
CO2 SERPL-SCNC: 31 MMOL/L (ref 20–32)
CREAT SERPL-MCNC: 0.47 MG/DL (ref 0.66–1.25)
CREAT SERPL-MCNC: 0.47 MG/DL (ref 0.66–1.25)
CREAT SERPL-MCNC: 0.58 MG/DL (ref 0.66–1.25)
ERYTHROCYTE [DISTWIDTH] IN BLOOD BY AUTOMATED COUNT: 17.2 % (ref 10–15)
GFR SERPL CREATININE-BSD FRML MDRD: >90 ML/MIN/1.73M2
GLUCOSE BLD-MCNC: 191 MG/DL (ref 70–99)
GLUCOSE BLD-MCNC: 191 MG/DL (ref 70–99)
GLUCOSE BLD-MCNC: 205 MG/DL (ref 70–99)
GLUCOSE BLDC GLUCOMTR-MCNC: 131 MG/DL (ref 70–99)
GLUCOSE BLDC GLUCOMTR-MCNC: 173 MG/DL (ref 70–99)
GLUCOSE BLDC GLUCOMTR-MCNC: 200 MG/DL (ref 70–99)
GLUCOSE BLDC GLUCOMTR-MCNC: 204 MG/DL (ref 70–99)
HBA1C MFR BLD: 5.3 % (ref 0–5.6)
HCO3 BLDV-SCNC: 29 MMOL/L (ref 21–28)
HCT VFR BLD AUTO: 30.8 % (ref 40–53)
HGB BLD-MCNC: 9.7 G/DL (ref 13.3–17.7)
INR PPP: 1.15 (ref 0.85–1.15)
MAGNESIUM SERPL-MCNC: 1.5 MG/DL (ref 1.6–2.3)
MCH RBC QN AUTO: 30.9 PG (ref 26.5–33)
MCHC RBC AUTO-ENTMCNC: 31.5 G/DL (ref 31.5–36.5)
MCV RBC AUTO: 98 FL (ref 78–100)
O2/TOTAL GAS SETTING VFR VENT: 50 %
PCO2 BLDV: 46 MM HG (ref 40–50)
PH BLDV: 7.4 [PH] (ref 7.32–7.43)
PHOSPHATE SERPL-MCNC: 3.6 MG/DL (ref 2.5–4.5)
PLATELET # BLD AUTO: 160 10E3/UL (ref 150–450)
PO2 BLDV: 40 MM HG (ref 25–47)
POTASSIUM BLD-SCNC: 4.4 MMOL/L (ref 3.4–5.3)
POTASSIUM BLD-SCNC: 4.6 MMOL/L (ref 3.4–5.3)
POTASSIUM BLD-SCNC: 4.6 MMOL/L (ref 3.4–5.3)
PROT SERPL-MCNC: 5.4 G/DL (ref 6.8–8.8)
RADIOLOGIST FLAGS: NORMAL
RBC # BLD AUTO: 3.14 10E6/UL (ref 4.4–5.9)
SODIUM SERPL-SCNC: 139 MMOL/L (ref 133–144)
SODIUM SERPL-SCNC: 140 MMOL/L (ref 133–144)
SODIUM SERPL-SCNC: 140 MMOL/L (ref 133–144)
TROPONIN I SERPL-MCNC: 0.04 UG/L (ref 0–0.04)
UFH PPP CHRO-ACNC: 0.41 IU/ML
WBC # BLD AUTO: 11.2 10E3/UL (ref 4–11)

## 2021-11-15 PROCEDURE — 250N000009 HC RX 250: Performed by: STUDENT IN AN ORGANIZED HEALTH CARE EDUCATION/TRAINING PROGRAM

## 2021-11-15 PROCEDURE — 250N000013 HC RX MED GY IP 250 OP 250 PS 637: Performed by: NURSE PRACTITIONER

## 2021-11-15 PROCEDURE — 87516 HEPATITIS B DNA AMP PROBE: CPT | Performed by: THORACIC SURGERY (CARDIOTHORACIC VASCULAR SURGERY)

## 2021-11-15 PROCEDURE — 85610 PROTHROMBIN TIME: CPT | Performed by: THORACIC SURGERY (CARDIOTHORACIC VASCULAR SURGERY)

## 2021-11-15 PROCEDURE — 93005 ELECTROCARDIOGRAM TRACING: CPT

## 2021-11-15 PROCEDURE — 250N000011 HC RX IP 250 OP 636: Performed by: INTERNAL MEDICINE

## 2021-11-15 PROCEDURE — 94640 AIRWAY INHALATION TREATMENT: CPT | Mod: 76

## 2021-11-15 PROCEDURE — 86832 HLA CLASS I HIGH DEFIN QUAL: CPT | Performed by: PHYSICIAN ASSISTANT

## 2021-11-15 PROCEDURE — 250N000013 HC RX MED GY IP 250 OP 250 PS 637: Performed by: STUDENT IN AN ORGANIZED HEALTH CARE EDUCATION/TRAINING PROGRAM

## 2021-11-15 PROCEDURE — 250N000011 HC RX IP 250 OP 636

## 2021-11-15 PROCEDURE — 94003 VENT MGMT INPAT SUBQ DAY: CPT

## 2021-11-15 PROCEDURE — 82040 ASSAY OF SERUM ALBUMIN: CPT | Performed by: THORACIC SURGERY (CARDIOTHORACIC VASCULAR SURGERY)

## 2021-11-15 PROCEDURE — 80048 BASIC METABOLIC PNL TOTAL CA: CPT

## 2021-11-15 PROCEDURE — 258N000003 HC RX IP 258 OP 636: Performed by: THORACIC SURGERY (CARDIOTHORACIC VASCULAR SURGERY)

## 2021-11-15 PROCEDURE — 250N000012 HC RX MED GY IP 250 OP 636 PS 637: Performed by: NURSE PRACTITIONER

## 2021-11-15 PROCEDURE — 83036 HEMOGLOBIN GLYCOSYLATED A1C: CPT | Performed by: STUDENT IN AN ORGANIZED HEALTH CARE EDUCATION/TRAINING PROGRAM

## 2021-11-15 PROCEDURE — 71045 X-RAY EXAM CHEST 1 VIEW: CPT

## 2021-11-15 PROCEDURE — 250N000012 HC RX MED GY IP 250 OP 636 PS 637: Performed by: PHYSICIAN ASSISTANT

## 2021-11-15 PROCEDURE — 250N000013 HC RX MED GY IP 250 OP 250 PS 637: Performed by: THORACIC SURGERY (CARDIOTHORACIC VASCULAR SURGERY)

## 2021-11-15 PROCEDURE — 93010 ELECTROCARDIOGRAM REPORT: CPT | Performed by: INTERNAL MEDICINE

## 2021-11-15 PROCEDURE — 200N000002 HC R&B ICU UMMC

## 2021-11-15 PROCEDURE — 86833 HLA CLASS II HIGH DEFIN QUAL: CPT | Performed by: PHYSICIAN ASSISTANT

## 2021-11-15 PROCEDURE — 80069 RENAL FUNCTION PANEL: CPT

## 2021-11-15 PROCEDURE — 94668 MNPJ CHEST WALL SBSQ: CPT

## 2021-11-15 PROCEDURE — 71045 X-RAY EXAM CHEST 1 VIEW: CPT | Mod: 77

## 2021-11-15 PROCEDURE — 250N000012 HC RX MED GY IP 250 OP 636 PS 637: Performed by: STUDENT IN AN ORGANIZED HEALTH CARE EDUCATION/TRAINING PROGRAM

## 2021-11-15 PROCEDURE — 84484 ASSAY OF TROPONIN QUANT: CPT | Performed by: STUDENT IN AN ORGANIZED HEALTH CARE EDUCATION/TRAINING PROGRAM

## 2021-11-15 PROCEDURE — 85027 COMPLETE CBC AUTOMATED: CPT | Performed by: THORACIC SURGERY (CARDIOTHORACIC VASCULAR SURGERY)

## 2021-11-15 PROCEDURE — 250N000013 HC RX MED GY IP 250 OP 250 PS 637: Performed by: ANESTHESIOLOGY

## 2021-11-15 PROCEDURE — 94640 AIRWAY INHALATION TREATMENT: CPT

## 2021-11-15 PROCEDURE — 250N000012 HC RX MED GY IP 250 OP 636 PS 637: Performed by: INTERNAL MEDICINE

## 2021-11-15 PROCEDURE — 97530 THERAPEUTIC ACTIVITIES: CPT | Mod: GP

## 2021-11-15 PROCEDURE — 84100 ASSAY OF PHOSPHORUS: CPT | Performed by: THORACIC SURGERY (CARDIOTHORACIC VASCULAR SURGERY)

## 2021-11-15 PROCEDURE — 250N000011 HC RX IP 250 OP 636: Performed by: STUDENT IN AN ORGANIZED HEALTH CARE EDUCATION/TRAINING PROGRAM

## 2021-11-15 PROCEDURE — 71045 X-RAY EXAM CHEST 1 VIEW: CPT | Mod: 26 | Performed by: RADIOLOGY

## 2021-11-15 PROCEDURE — 82803 BLOOD GASES ANY COMBINATION: CPT | Performed by: STUDENT IN AN ORGANIZED HEALTH CARE EDUCATION/TRAINING PROGRAM

## 2021-11-15 PROCEDURE — 97110 THERAPEUTIC EXERCISES: CPT | Mod: GP

## 2021-11-15 PROCEDURE — 71045 X-RAY EXAM CHEST 1 VIEW: CPT | Mod: 26 | Performed by: STUDENT IN AN ORGANIZED HEALTH CARE EDUCATION/TRAINING PROGRAM

## 2021-11-15 PROCEDURE — 85520 HEPARIN ASSAY: CPT | Performed by: THORACIC SURGERY (CARDIOTHORACIC VASCULAR SURGERY)

## 2021-11-15 PROCEDURE — 99207 PR NON-BILLABLE SERV PER CHARTING: CPT | Performed by: PHYSICIAN ASSISTANT

## 2021-11-15 PROCEDURE — 250N000013 HC RX MED GY IP 250 OP 250 PS 637

## 2021-11-15 PROCEDURE — 250N000011 HC RX IP 250 OP 636: Performed by: THORACIC SURGERY (CARDIOTHORACIC VASCULAR SURGERY)

## 2021-11-15 PROCEDURE — 99233 SBSQ HOSP IP/OBS HIGH 50: CPT | Mod: 24 | Performed by: INTERNAL MEDICINE

## 2021-11-15 PROCEDURE — 999N000157 HC STATISTIC RCP TIME EA 10 MIN

## 2021-11-15 PROCEDURE — 83735 ASSAY OF MAGNESIUM: CPT | Performed by: THORACIC SURGERY (CARDIOTHORACIC VASCULAR SURGERY)

## 2021-11-15 RX ORDER — PROPOFOL 10 MG/ML
5-75 INJECTION, EMULSION INTRAVENOUS CONTINUOUS
Status: DISCONTINUED | OUTPATIENT
Start: 2021-11-15 | End: 2021-11-18

## 2021-11-15 RX ORDER — MAGNESIUM SULFATE HEPTAHYDRATE 40 MG/ML
2 INJECTION, SOLUTION INTRAVENOUS ONCE
Status: COMPLETED | OUTPATIENT
Start: 2021-11-15 | End: 2021-11-15

## 2021-11-15 RX ORDER — LORAZEPAM 2 MG/ML
1 INJECTION INTRAMUSCULAR EVERY 6 HOURS PRN
Status: DISCONTINUED | OUTPATIENT
Start: 2021-11-15 | End: 2021-11-16

## 2021-11-15 RX ORDER — ACETAZOLAMIDE 500 MG/5ML
500 INJECTION, POWDER, LYOPHILIZED, FOR SOLUTION INTRAVENOUS ONCE
Status: COMPLETED | OUTPATIENT
Start: 2021-11-15 | End: 2021-11-15

## 2021-11-15 RX ORDER — HYDRALAZINE HYDROCHLORIDE 20 MG/ML
10 INJECTION INTRAMUSCULAR; INTRAVENOUS ONCE
Status: COMPLETED | OUTPATIENT
Start: 2021-11-15 | End: 2021-11-15

## 2021-11-15 RX ORDER — PIPERACILLIN SODIUM, TAZOBACTAM SODIUM 4; .5 G/20ML; G/20ML
4.5 INJECTION, POWDER, LYOPHILIZED, FOR SOLUTION INTRAVENOUS EVERY 6 HOURS
Status: DISCONTINUED | OUTPATIENT
Start: 2021-11-15 | End: 2021-11-15

## 2021-11-15 RX ORDER — LORAZEPAM 2 MG/ML
INJECTION INTRAMUSCULAR
Status: COMPLETED
Start: 2021-11-15 | End: 2021-11-15

## 2021-11-15 RX ORDER — FUROSEMIDE 10 MG/ML
40 INJECTION INTRAMUSCULAR; INTRAVENOUS EVERY 8 HOURS
Status: DISCONTINUED | OUTPATIENT
Start: 2021-11-15 | End: 2021-11-17

## 2021-11-15 RX ORDER — PIPERACILLIN SODIUM, TAZOBACTAM SODIUM 4; .5 G/20ML; G/20ML
4.5 INJECTION, POWDER, LYOPHILIZED, FOR SOLUTION INTRAVENOUS EVERY 6 HOURS
Status: DISCONTINUED | OUTPATIENT
Start: 2021-11-15 | End: 2021-11-23

## 2021-11-15 RX ORDER — QUETIAPINE FUMARATE 25 MG/1
25 TABLET, FILM COATED ORAL 2 TIMES DAILY
Status: DISCONTINUED | OUTPATIENT
Start: 2021-11-15 | End: 2021-12-13 | Stop reason: HOSPADM

## 2021-11-15 RX ORDER — LEVALBUTEROL INHALATION SOLUTION 1.25 MG/3ML
1.25 SOLUTION RESPIRATORY (INHALATION) 4 TIMES DAILY
Status: DISCONTINUED | OUTPATIENT
Start: 2021-11-15 | End: 2021-11-23

## 2021-11-15 RX ADMIN — PREDNISOLONE 12.5 MG: 15 SOLUTION ORAL at 08:44

## 2021-11-15 RX ADMIN — INSULIN ASPART 2 UNITS: 100 INJECTION, SOLUTION INTRAVENOUS; SUBCUTANEOUS at 19:55

## 2021-11-15 RX ADMIN — LEVALBUTEROL HYDROCHLORIDE 1.25 MG: 1.25 SOLUTION RESPIRATORY (INHALATION) at 12:28

## 2021-11-15 RX ADMIN — INSULIN GLARGINE 30 UNITS: 100 INJECTION, SOLUTION SUBCUTANEOUS at 11:51

## 2021-11-15 RX ADMIN — MYCOPHENOLATE MOFETIL 1000 MG: 200 POWDER, FOR SUSPENSION ORAL at 08:44

## 2021-11-15 RX ADMIN — CALCIUM CARBONATE 600 MG (1,500 MG)-VITAMIN D3 400 UNIT TABLET 1 TABLET: at 17:30

## 2021-11-15 RX ADMIN — ACETYLCYSTEINE 2 ML: 200 SOLUTION ORAL; RESPIRATORY (INHALATION) at 08:12

## 2021-11-15 RX ADMIN — HYDROXYZINE HYDROCHLORIDE 50 MG: 50 TABLET, FILM COATED ORAL at 02:26

## 2021-11-15 RX ADMIN — CALCIUM CARBONATE 600 MG (1,500 MG)-VITAMIN D3 400 UNIT TABLET 1 TABLET: at 08:41

## 2021-11-15 RX ADMIN — MAGNESIUM SULFATE HEPTAHYDRATE 2 G: 40 INJECTION, SOLUTION INTRAVENOUS at 07:55

## 2021-11-15 RX ADMIN — LEVALBUTEROL HYDROCHLORIDE 1.25 MG: 1.25 SOLUTION RESPIRATORY (INHALATION) at 08:12

## 2021-11-15 RX ADMIN — FUROSEMIDE 40 MG: 10 INJECTION, SOLUTION INTRAVENOUS at 18:27

## 2021-11-15 RX ADMIN — HYDROXYZINE HYDROCHLORIDE 50 MG: 50 TABLET, FILM COATED ORAL at 08:41

## 2021-11-15 RX ADMIN — ACYCLOVIR 400 MG: 200 SUSPENSION ORAL at 08:43

## 2021-11-15 RX ADMIN — ACYCLOVIR 400 MG: 200 SUSPENSION ORAL at 19:55

## 2021-11-15 RX ADMIN — Medication 40 MG: at 19:54

## 2021-11-15 RX ADMIN — LORAZEPAM 1 MG: 2 INJECTION INTRAMUSCULAR at 10:17

## 2021-11-15 RX ADMIN — Medication 1 PACKET: at 19:54

## 2021-11-15 RX ADMIN — LORAZEPAM 1 MG: 2 INJECTION INTRAMUSCULAR; INTRAVENOUS at 10:17

## 2021-11-15 RX ADMIN — POTASSIUM CHLORIDE 40 MEQ: 40 SOLUTION ORAL at 08:41

## 2021-11-15 RX ADMIN — MULTIVIT AND MINERALS-FERROUS GLUCONATE 9 MG IRON/15 ML ORAL LIQUID 15 ML: at 08:41

## 2021-11-15 RX ADMIN — Medication 1 PACKET: at 08:44

## 2021-11-15 RX ADMIN — Medication 40 MG: at 08:41

## 2021-11-15 RX ADMIN — LEVOTHYROXINE SODIUM 25 MCG: 0.03 TABLET ORAL at 08:42

## 2021-11-15 RX ADMIN — Medication 1 PACKET: at 08:45

## 2021-11-15 RX ADMIN — INSULIN ASPART 3 UNITS: 100 INJECTION, SOLUTION INTRAVENOUS; SUBCUTANEOUS at 11:52

## 2021-11-15 RX ADMIN — NYSTATIN 1000000 UNITS: 500000 SUSPENSION ORAL at 16:04

## 2021-11-15 RX ADMIN — METOPROLOL TARTRATE 75 MG: 25 TABLET, FILM COATED ORAL at 08:41

## 2021-11-15 RX ADMIN — ACETYLCYSTEINE 2 ML: 200 SOLUTION ORAL; RESPIRATORY (INHALATION) at 12:28

## 2021-11-15 RX ADMIN — ROSUVASTATIN CALCIUM 10 MG: 10 TABLET, FILM COATED ORAL at 08:42

## 2021-11-15 RX ADMIN — QUETIAPINE FUMARATE 25 MG: 25 TABLET ORAL at 10:17

## 2021-11-15 RX ADMIN — PIPERACILLIN SODIUM AND TAZOBACTAM SODIUM 4.5 G: 4; .5 INJECTION, POWDER, LYOPHILIZED, FOR SOLUTION INTRAVENOUS at 16:04

## 2021-11-15 RX ADMIN — FUROSEMIDE 40 MG: 10 INJECTION, SOLUTION INTRAVENOUS at 10:42

## 2021-11-15 RX ADMIN — INSULIN ASPART 3 UNITS: 100 INJECTION, SOLUTION INTRAVENOUS; SUBCUTANEOUS at 16:17

## 2021-11-15 RX ADMIN — PREDNISOLONE 12.5 MG: 15 SOLUTION ORAL at 19:54

## 2021-11-15 RX ADMIN — INSULIN ASPART 3 UNITS: 100 INJECTION, SOLUTION INTRAVENOUS; SUBCUTANEOUS at 04:25

## 2021-11-15 RX ADMIN — FLUCONAZOLE IN SODIUM CHLORIDE 400 MG: 2 INJECTION, SOLUTION INTRAVENOUS at 11:53

## 2021-11-15 RX ADMIN — TACROLIMUS 2 MG: 5 CAPSULE ORAL at 08:45

## 2021-11-15 RX ADMIN — PROPOFOL 10 MCG/KG/MIN: 10 INJECTION, EMULSION INTRAVENOUS at 10:24

## 2021-11-15 RX ADMIN — NYSTATIN 1000000 UNITS: 500000 SUSPENSION ORAL at 11:28

## 2021-11-15 RX ADMIN — Medication 10 MG: at 19:54

## 2021-11-15 RX ADMIN — LEVALBUTEROL HYDROCHLORIDE 1.25 MG: 1.25 SOLUTION RESPIRATORY (INHALATION) at 19:59

## 2021-11-15 RX ADMIN — TACROLIMUS 2 MG: 5 CAPSULE ORAL at 17:30

## 2021-11-15 RX ADMIN — ACETYLCYSTEINE 2 ML: 200 SOLUTION ORAL; RESPIRATORY (INHALATION) at 19:59

## 2021-11-15 RX ADMIN — LEVALBUTEROL HYDROCHLORIDE 1.25 MG: 1.25 SOLUTION RESPIRATORY (INHALATION) at 16:37

## 2021-11-15 RX ADMIN — HYDRALAZINE HYDROCHLORIDE 20 MG: 20 INJECTION INTRAMUSCULAR; INTRAVENOUS at 09:23

## 2021-11-15 RX ADMIN — SULFAMETHOXAZOLE AND TRIMETHOPRIM 1 TABLET: 400; 80 TABLET ORAL at 08:43

## 2021-11-15 RX ADMIN — Medication 25 MCG/HR: at 17:16

## 2021-11-15 RX ADMIN — Medication 1 PACKET: at 13:34

## 2021-11-15 RX ADMIN — PIPERACILLIN SODIUM AND TAZOBACTAM SODIUM 4.5 G: 4; .5 INJECTION, POWDER, LYOPHILIZED, FOR SOLUTION INTRAVENOUS at 21:13

## 2021-11-15 RX ADMIN — HEPARIN SODIUM 1100 UNITS/HR: 1000 INJECTION INTRAVENOUS; SUBCUTANEOUS at 03:35

## 2021-11-15 RX ADMIN — NYSTATIN 1000000 UNITS: 500000 SUSPENSION ORAL at 19:54

## 2021-11-15 RX ADMIN — MYCOPHENOLATE MOFETIL 1000 MG: 200 POWDER, FOR SUSPENSION ORAL at 19:54

## 2021-11-15 RX ADMIN — AMIODARONE HYDROCHLORIDE 200 MG: 200 TABLET ORAL at 08:42

## 2021-11-15 RX ADMIN — HYDROXYZINE HYDROCHLORIDE 50 MG: 50 TABLET, FILM COATED ORAL at 17:30

## 2021-11-15 RX ADMIN — ESCITALOPRAM 5 MG: 5 TABLET, FILM COATED ORAL at 08:41

## 2021-11-15 RX ADMIN — ACETAZOLAMIDE 500 MG: 500 INJECTION, POWDER, LYOPHILIZED, FOR SOLUTION INTRAVENOUS at 11:29

## 2021-11-15 RX ADMIN — NYSTATIN 1000000 UNITS: 500000 SUSPENSION ORAL at 08:42

## 2021-11-15 RX ADMIN — QUETIAPINE FUMARATE 25 MG: 25 TABLET ORAL at 19:54

## 2021-11-15 RX ADMIN — HYDRALAZINE HYDROCHLORIDE 10 MG: 20 INJECTION INTRAMUSCULAR; INTRAVENOUS at 09:44

## 2021-11-15 RX ADMIN — ACETYLCYSTEINE 2 ML: 200 SOLUTION ORAL; RESPIRATORY (INHALATION) at 16:37

## 2021-11-15 RX ADMIN — PROPOFOL 25 MCG/KG/MIN: 10 INJECTION, EMULSION INTRAVENOUS at 19:54

## 2021-11-15 ASSESSMENT — ACTIVITIES OF DAILY LIVING (ADL)
ADLS_ACUITY_SCORE: 23

## 2021-11-15 NOTE — PROGRESS NOTES
Care Management Follow Up    Length of Stay (days): 71    Expected Discharge Date:  TBD     Concerns to be Addressed:  Care conference     Patient plan of care discussed at interdisciplinary rounds: Yes    Anticipated Discharge Disposition:  LTAC     Anticipated Discharge Services:  Vent weaning    Additional Information:  Tentative update care conf. was scheduled for tomorrow, 11/16 at 2:00.  RNCC contacted this week Pulmonary providers and CVICU team to conform their availability.  Both teams agreed to meet tomorrow, 11/16 at 2:00.  RNCC contacted pt dtrZenia # [phone] and conformed the conf. date and time.  RNCC provided conference call in number to Zenia.  Team members that have been informed about the care conf are CVTS team, Pulmonary team ( Dr. Wells # [id number], GERA Degroot # [id number], Mallory Beasley, transplant RNCC, Leisa Navarro # [id number], ), bedside RN and charge nurse.  Text message sent to CVJULIA, Dr. Corral # [id number].  Providers meeting at 1:45 in 4C conf. Room prior meeting with family.  All family members will call in.  RNCC will cont to follow plan of care.      Conference call in #  [phone]  ID- [id number]  PIN- [phone]      Jennifer Pate RN, PHN, BSN  4A and 4E/ ICU  Care Coordinator  Phone: [phone]  Pager: [phone]    To get in touch with weekend & Holiday on call RN Care Coordinator  Page [phone] or Care Coordinator Job code/pager- [phone]

## 2021-11-15 NOTE — PLAN OF CARE
Shift Summary:  Pt able to rest on CPAP/PS most of the night without issue. Woke up around 2 am very anxious and restless. PRN Atarax given, successfully de-escalated with redirection and Atarax. Occasional cough leak with Bivona trach today with increased work of breathing, otherwise no issues, may be related to anxiety.     PLAN:    Trach [place] this AM

## 2021-11-15 NOTE — PROGRESS NOTES
Major Shift Events: Pt able to trach dome wean for 1 hr after awakening from bronch sedation. He became restless and tachypneic before being switched back to CPAP. Occasional cough leak with Bivona trach today with increased work of breathing, otherwise no issues. Rectal tube in place for liquid stools. Watson reinserted per CV ICU team for urinary retention.   Plan: Trach dome wean as much as tolerated. Encourage rehab.   For vital signs and complete assessments, please see documentation flowsheets.

## 2021-11-15 NOTE — PROGRESS NOTES
Pulmonary Medicine  Cystic Fibrosis - Lung Transplant Team  Progress Note  11/15/2021       Patient: Edson Thornton  MRN: [id number]  : 1965 (age 56 year old)  Transplant: 10/16/2021 (Lung), POD#30  Admission date: 2021    Assessment & Plan:     Edson Thornton is a 56 year old male with a PMH significant for NSIP/ILD, bronchiectasis, moderate PH, RA, SALTY, chronic HSV infection, hypogammaglobulinemia, steroid-induced diabetes, hypothyroidism, PFO, HTN, HLD, duodenal anomaly, anxiety, and depression.  Admitted on 21 from OSH for acute on chronic respiratory failure 2/2 ILD exacerbation, now s/p BSLT on 10/16/21.  Prolonged vent wean s/p trach and PEG/J tube placement with thoracic surgery 10/29.  Post-op course otherwise complicated by encephalopathy and diffuse weakness, acute to subacute CVA, afib with RVR, BRENNAN, GI bleed, and Candidemia/Candida empyema, and anxiety.  Code called  for bradycardia/asystole and progressively hypotensive, found to have acute drop in hemoglobin and bloody G tube output.  Intermittently tolerating PS/TD trials and with ongoing improvement in mentation and weakness.     Today's recommendations:    - TD/PS trials during the day per ICU team otherwise CMV between and overnight  - VBG  - CXR daily while left chest tube in place  - Tacrolimus level ordered tomorrow  - Prednisolone taper due  (not yet ordered)  - CMV and EBV ordered tomorrow  - Continue acyclovir through today  - Follow pending blood cultures (11/10)  - Coccidioides Ag ordered   - Transition to Zosyn given Pseudomonas fluorescens/putida (and Klebsiella pneumoniae) on bronch culture (), plan for 2 week course  - Continue fluconazole through , EKG weekly for QTc monitoring  - IgG ordered   - DSA (11/15) pending  - PHS high risk donor risk labs (11/15) pending     S/p BSLT for ILD:  Acute hypoxic respiratory failure s/p trach:   Bilateral pleural effusions: Unfortunately had not  received vaccination for flu, PNA, or COVID-19 PTA.  Explant pathology with NSIP, no malignancy.  PGD 2-3.  Weaned off paralytic 10/19 (for vent dyssynchrony) and Caroline 10/22.  Initial difficulty weaning sedation given agitation then with neurological findings as below.  Prolonged vent wean s/p trach and PEG/J tube placement with thoracic surgery 10/29.  Code called 11/2 for bradycardia/asystole (required 1 round of CPR, no medications) then progressively hypotensive, GI bleed as below.  Chest CT 11/2 with increased bilateral pleural effusions (moderate left, small right), bibasilar atelectasis with area of hypoenhancing parenchyma in LLL (suspicious for infection), and numerous nondisplaced anterior rib fractures bilaterally.  S/p left chest tube placement in IR 11/3 for pleural effusion/empyema (as below), right deferred given very little effusion on US.  Problems with trach cuff leak 11/3 (requiring multiple exchanges), bronch 11/14 with trach in good position with cuff well sealed in trachea and small to moderate secretions mostly in lower lobes.  Intermittently tolerating PS/TD trials, remained on PS overnight 11/14 to 11/15 although with increased anxiety and dyspnea 11/15 morning.    - Nebs: levalbuterol and Mucomyst QID  - Aggressive pulmonary toilet with chest physiotherapy QID  - TD/PS trials during the day per ICU team otherwise CMV between and overnight  - VBG  - Volume management per ICU team  - CXR daily while left chest tube in place, today with ongoing basilar predominant pulmonary opacities (personally reviewed with Dr. Wells)  - Repeat bronch not indicated today, revisit prn     Immunosuppression: s/p induction therapy with basiliximab 10/16 (and high dose IV steroid) and 10/20  - Tacrolimus 2 mg BID (suspension, via G tube).  Goal level 8-12.  Repeat level 11/16 (ordered).  - MMF 1000 mg BID (11/2, decreased given GI bleed, AZA to be avoided given TPMT)  - Prednisolone 12.5 mg BID, next taper due  11/21 (not yet ordered)  Date AM dose (mg) PM dose (mg)   11/7/21 12.5 12.5   11/21/21 12.5 10   12/5/21 10 10   1/2/22 10 7.5   1/30/22 7.5 7.5   2/27/22 7.5 5   3/27/22 5 5   4/24/22 5 2.5      Prophylaxis:   - Bactrim for PJP ppx (held 11/2-11/5 d/t BRENNAN)  - Nystatin for oral candidiasis ppx, 6 month course  - See below for serologies and viral ppx:    Donor Recipient Intervention   CMV status Negative Negative None, CMV monthly (ordered 11/16)   EBV status Positive Positive None, EBV monthly (ordered 11/16)   HSV status N/A Positive Acyclovir POD #1-30 through 11/15 (ordered)  (recent infection history pre-txp)      ID: Concern for possible Strongyloides exposure pre-transplant s/p ivermectin x1 dose (9/17).  Donor and recipient cultures NGTD.  S/p IV ceftazidime/vancomycin for 48h per protocol and additional empiric ceftazidime 10/19-10/23 given recurrent fevers as below.  Cryptococcal Ag negative 10/23.  - Blood cultures (11/10) NGTD  - Monthly Coccidioides Ag x12 months post-transplant per ID (urine and serum negative 10/17), due 11/17 (ordered)    Klebsiella pneumoniae:  Pseudomonas fluorescens/putida: Klebsiella initially noted trach sputum culture 11/10 (R-ampicillin, ciprofloxacin; I-ampicillin/sulbactam, levofloxacin).  Started on ceftriaxone 11/11, transitioned to ertapenem 11/12-11/13, and back to ceftriaxone 11/14.  Bronch culture 11/12 with Klebsiella and Pseudomonas fluorescens/putida (R-meropenem).     - ABX: transition to Zosyn, plan for 2 week course    Recurring fevers, Resolved:  Fevers post-op, Tmax 101.7 POD #1.  Febrile with worsening leukocytosis again 10/25, generally persisting.  LP (10/29), xanthochromic with pleocytosis thought to be appropriate given RBC and WBCs, no ABX recommended per transplant ID and neurology.  S/p empiric meropenem 10/28-11/2.  Now afebrile, ABX as above.     Disseminated Candida: Noted on blood cultures 10/20 and 10/22.  BDG fungitell positive (399) on  10/20.  Respiratory cultures with persistent Candida albicans.  TC 10/23 without evidence of endocarditis.  Ophthalmology consult 10/24 with benign dilated fundoscopic exam.  Candida empyema also noted 10/25, chest tubes inadvertently removed by CVTS 10/28, left chest tube replaced by IR 11/3 as above with ongoing Candida on cultures.    - Fluconazole (10/26-11/25 per transplant ID, prior micafungin 10/22-10/27), repeat EKG for QTc monitoring 11/16 (ordered), LFTs as below (see transplant ID note 11/5, will need repeat imaging to ensure left pleural effusion almost entirely resolved before removing chest tube and concluding fluconazole)      HSV: Chronic intermittent active infection pre-transplant with recent HSV infection: crusted lesions throughout left side of jaw, s/p 10 day treatment course of ACV through 10/9.  HSV PCR blood negative 10/17.  - ACV ppx as above (started POD #1 instead of POD #8 given HSV history and location)     Hypogammaglobulinemia: IgG previously low at 364 (9/7).  Noted at 265 at time of transplant, s/p IVIG with premedication 10/21.    - Repeat IgG 11/17 (ordered)     Positive cross match: Note that he received two doses of rituximab in June, which is likely contributing to cross match result.  DSA most recently negative 11/1.  - DSA monitoring q2 weeks, (11/15) pending     PHS risk criteria donor:  Additional labs required post-transplant (between 4-8 weeks post-op): Hepatitis B, Hepatitis C, and HIV by VISHAL (11/15, pending).     SALTY: Noted pre-transplant.  Home CPAP 6-12 cm H2O.  - Resume BiPAP after decannulation.     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  MRI 10/23 with multifocal subacute infarcts within both  cerebral hemispheres and left cerebellum.  DDx include surgery v embolic v infectious.  Heparin drip started 10/23 (intermittently held with GI bleed as below).  Repeat stroke code 10/25 d/t marked decrease in responsiveness with sedation wean, pupil inequality, and absent gag reflex.  CTA head without obvious new pathology, MRI brain (with and without contrast) primarily revealing for infarct, low likelihood of PRES.  Ammonia normal.  VEEG per neuro with severe diffuse encephalopathy.  Gradual improvement noted since 10/29, repeat head CT 11/2 (following code) without new acute intracranial abnormalities.  - AC management per primary team as below  - PT/OT     Afib with RVR: Noted 10/18, started on amiodarone drip and transitioned to PO 10/21, dose decreased 10/29 (anticipate 4 week course).  Currently in SR.  Heparin drip as above.  Metoprolol resumed 11/3.     Right subclavian DVT: Seen on UE US from 11/4.  Heparin drip resumed 11/5.        Recurrent GI bleed, Resolved: Hemoglobin dropped to 6.6 10/22, s/p 2 units pRBC.  OG tube with bloody output, EGD 10/23 noted NJ/OG tube trauma with scant oozing.  Progressive hypotension 11/2, hemoglobin 5.8 with bloody G tube output.  Heparin drip held, transitioned to PPI drip, and MTP activated.  EGD 11/2 with large amount of clotted blood in stomach and area of raised mucosa with small adherent clot near PEG tube site that was clipped, no active bleeding.  Abdominal CT 11/2 with stomach distended with blood products and dilated small bowel.  Maroon stools 11/3, repeat EGD with extensive old blood in stomach, no active bleeding, small nodular area with prior clips clipped again.  Most recent EGD 11/5 with ulcer noted at PEG tube bumper site, gastritis, and suction marks from G tube, and GJ arm is in the duodenal bulb.  Large amount of TF noted in G tube drainage 11/7.  AXR 11/9 with GJ tube tip projecting over proximal duodenum.  GI exchanged GJ tube 11/9.     BRENNAN,  "Resolved:   Hyperkalemia, Resolved: Baseline creatinine 0.7.  BRENNAN noted POD #1, UO also downtrending.  Improved with aggressive diuresis, Cr worsened again (1.38 on 11/2) along with up trending BUN and hyperkalemia.  Nephrology consulted 11/1, thought to be prerenal/component of ATN.  Now resolved.     Elevated LFTs: Shock liver post-op.  Initial ALT//230 evening of surgery, then with marked increase to 1550/1246 POD #1.  Intermittently elevated since and recently with alk phos elevation.    We appreciate the excellent care provided by the CVICU and CVTS teams.  Recommendations communicated via in person rounding and this note.  Will continue to follow along closely, please do not hesitate to call with any questions or concerns.    Patient seen and discussed with Dr. Wells.    Keyla Rice PA-C  Inpatient [facility]  Pulmonary CF/Transplant     Subjective & Interval History:     TD for 1h yesterday.  Remained on PS overnight, FiO2 40 --> 50%.  Anxious and dyspneic this morning.  Received chest physiotherapy TID yesterday.  Denies significant cough.  Denies pain.  Tmax 99.1, improving leukocytosis.      Review of Systems:     C: + decrease in weight  INTEGUMENTARY/SKIN: No rash or obvious new lesions  ENT/MOUTH: No sore throat, no sinus pain, no nasal congestion or drainage  RESP: See interval history  CV: No chest pain, no peripheral edema  GI: No nausea, no vomiting, no change in loose stools, no reflux symptoms  : + Watson due to urinary retention   MUSCULOSKELETAL: No myalgias, no arthralgias  ENDOCRINE: Blood sugars with adequate control  NEURO: No headache  PSYCHIATRIC: See interval history    Physical Exam:     Vital signs:  Temp: 97.7  F (36.5  C) Temp src: Axillary BP: 136/85 Pulse: 63   Resp: (!) 34 SpO2: 99 % O2 Device: Mechanical Ventilator Oxygen Delivery: 45 LPM Height: 162.6 cm (5' 4\") Weight: 63.5 kg (139 lb 15.9 oz)  I/O:     Intake/Output Summary (Last 24 hours) at 11/15/2021 1028  Last data " filed at 11/15/2021 1000  Gross per 24 hour   Intake 2036 ml   Output 2040 ml   Net -4 ml     Constitutional: Sitting up in bed, in mild distress due to dyspnea/anxiety.   HEENT: Eyes with pink conjunctivae, anicteric.  Oral mucosa moist without lesions.  Trach cdi.  PULM: Fair air flow bilaterally.  No crackles, no rhonchi, no wheezes.  Mildly-labored breathing on PS 10/5 with FiO2 50% at times.  CV: Normal S1 and S2.  RRR.  No murmur, gallop, or rub.  No peripheral edema.   ABD: NABS, soft, nontender, nondistended.    MSK: Able to squeeze left > right hand and move BLE.    NEURO: Alert, following commands, able to answer yes/no questions by nodding/shaking head.   SKIN: Warm, dry.  No rash on limited exam.   PSYCH: Anxious, restless.     Lines, Drains, and Devices:  Peripheral IV 11/02/21 Anterior;Right Upper forearm (Active)   Site Assessment [facility] 11/15/21 0400   Line Status Infusing;Checked every 1-2 hour 11/15/21 0400   Dressing Intervention Dressing reinforced 11/08/21 2000   Phlebitis Scale 0-->no symptoms 11/15/21 0400   Infiltration Scale 0 11/15/21 0400   Infiltration Site Treatment Method  None 11/10/21 0400   Number of days: 13       PICC Triple Lumen 11/04/21 Left Basilic Access. PICC okay to use. (Active)   Site Assessment [facility] 11/15/21 0400   External Cath Length (cm) 1 cm 11/04/21 1747   Extremity Circumference (cm) 28 cm 11/04/21 1747   Dressing Intervention Transparent;Gauze 11/15/21 0400   Dressing Change Due 11/21/21 11/15/21 0400   Mosley - Status occluded 11/15/21 0400   Gray - Cap Change Due 11/16/21 11/15/21 0400   Red - Status saline locked 11/15/21 0400   Red - Cap Change Due 11/16/21 11/15/21 0400   White - Status infusing 11/15/21 0400   White - Cap Change Due 11/16/21 11/15/21 0400   Extravasation? No 11/15/21 0400   Line Necessity Yes, meets criteria 11/15/21 0400   Number of days: 11     Data:     LABS    CMP:   Recent Labs   Lab 11/15/21  0749 11/15/21  0344 11/14/21  2310 11/14/21  1940  11/14/21  1536 11/14/21  0307 11/14/21  0258 11/13/21  1522 11/13/21  1521 11/13/21  0811 11/13/21  0340 11/12/21  0322 11/12/21  0316   NA  --  140  140  --   --  140  --  140  140  --  142  --  140  140   < > 141  141   POTASSIUM  --  4.6  4.6  --   --  4.9  --  4.4  4.4  --  4.3  --  4.1  4.1   < > 4.2  4.2   CHLORIDE  --  106  106  --   --  107  --  105  105  --  106  --  106  106   < > 108  108   CO2  --  31  31  --   --  29  --  30  30  --  29  --  31  31   < > 28  28   ANIONGAP  --  3  3  --   --  4  --  5  5  --  7  --  3  3   < > 5  5   * 191*  191* 162* 132* 179*  162*   < > 172*  172*   < > 184*   < > 190*  190*   < > 196*  196*   BUN  --  37*  37*  --   --  40*  --  47*  47*  --  42*  --  44*  44*   < > 40*  40*   CR  --  0.47*  0.47*  --   --  0.54*  --  0.63*  0.63*  --  0.68  --  0.61*  0.61*   < > 0.58*  0.58*   GFRESTIMATED  --  >90  >90  --   --  >90  --  >90  >90  --  >90  --  >90  >90   < > >90  >90   ERIK  --  8.7  8.7  --   --  8.7  --  8.8  8.8  --  9.0  --  8.6  8.6   < > 8.4*  8.4*   MAG  --  1.5*  --   --   --   --  1.5*  --   --   --  1.6  --  1.5*   PHOS  --  3.6  --   --   --   --  4.2  --   --   --  4.3  --  3.6   PROTTOTAL  --  5.4*  --   --   --   --  5.6*  --   --   --  5.0*  --  4.7*   ALBUMIN  --  2.1*  --   --   --   --  2.1*  --   --   --  2.0*  --  1.7*   BILITOTAL  --  0.2  --   --   --   --  0.2  --   --   --  0.3  --  0.2   ALKPHOS  --  150  --   --   --   --  160*  --   --   --  144  --  138   AST  --  16  --   --   --   --  18  --   --   --  16  --  13   ALT  --  33  --   --   --   --  33  --   --   --  33  --  34    < > = values in this interval not displayed.     CBC:   Recent Labs   Lab 11/15/21  0344 11/14/21  0258 11/13/21  0340 11/12/21  0316   WBC 11.2* 16.9* 16.4* 12.1*   RBC 3.14* 3.25* 2.97* 2.82*   HGB 9.7* 9.9* 9.0* 8.5*   HCT 30.8* 31.6* 29.0* 27.5*   MCV 98 97 98 98   MCH 30.9 30.5 30.3 30.1   MCHC 31.5 31.3*  31.0* 30.9*   RDW 17.2* 17.4* 17.4* 17.4*    176 169 162       INR:   Recent Labs   Lab 11/15/21  0344 11/14/21  0258 11/13/21  0340 11/12/21  0316   INR 1.15 1.17* 1.10 1.10       Glucose:   Recent Labs   Lab 11/15/21  0749 11/15/21  0344 11/14/21  2310 11/14/21  1940 11/14/21  1536   * 191*  191* 162* 132* 179*  162*       Blood Gas:   Recent Labs   Lab 11/15/21  1012 11/14/21  0534 11/11/21  1740 11/11/21  1516   PHV 7.40 7.43  --  7.43   PCO2V 46 47  --  40   PO2V 40 38  --  34   HCO3V 29* 31*  --  26   LORI 3.3* 5.8*  --  1.8   O2PER 50 30 40 40       Culture Data No results for input(s): CULT in the last 168 hours.    Virology Data:   Lab Results   Component Value Date    FLUAH1 Negative 11/12/2021    FLUAH3 Negative 11/12/2021    TL5431 Negative 11/12/2021    IFLUB Negative 11/12/2021    RSVA Negative 11/12/2021    RSVB Negative 11/12/2021    PIV1 Negative 11/12/2021    PIV2 Negative 11/12/2021    PIV3 Negative 11/12/2021    HMPV Negative 11/12/2021    HRVS Negative 11/12/2021    ADVBE Negative 11/12/2021    ADVC Negative 11/12/2021    ADVC Negative 10/17/2021       Historical CMV results (last 3 of prior testing):  Lab Results   Component Value Date    CMVQNT Not Detected 11/12/2021    CMVQNT Not Detected 10/26/2021     No results found for: CMVLOG    Urine Studies    Recent Labs   Lab Test 11/01/21  1336 10/25/21  1507   URINEPH 5.5 5.5   NITRITE Negative Negative   LEUKEST Negative Negative   WBCU 0 2       Most Recent [facility] Pulmonary Function Testing (FVC/FEV1 only)  No results found for: 20002  No results found for: 20003  No results found for: 20015  No results found for: 20016    IMAGING    Recent Results (from the past 48 hour(s))   XR Chest Port 1 View    Narrative    EXAM: XR CHEST PORT 1 VIEW  11/13/2021 3:18 PM     HISTORY:  picc       COMPARISON:  Chest x-ray from same date at 0638 hours    FINDINGS:   Frontal radiograph of the chest. Post surgical changes of bilateral  lung  transplant with intact clamshell sternotomy wires. Percutaneous  tracheostomy tube projects over the mid thoracic trachea. Stable  position of left basilar chest tube. Left arm PICC line has been  retracted with the tip now in the innominate vein.    Trachea is midline. Stable cardiac silhouette. Grossly unchanged  perihilar and bibasilar opacities. No significant pleural effusion. No  pneumothorax.      Impression    IMPRESSION:   1. Retracted left PICC line with the tip now in the left innominate  vein. Remaining support devices are stable.  2. Unchanged perihilar and bibasilar opacities, likely atelectasis and  edema.    I have personally reviewed the examination and initial interpretation  and I agree with the findings.    ARIES DELGADO MD         SYSTEM ID:  [id number]   XR Chest Port 1 View    Narrative    EXAMINATION:  XR CHEST PORT 1 VIEW 11/13/2021 10:56 PM.    COMPARISON: 11/13/2021.    HISTORY:  looking at enastemosis site, trach cuff keeps getting popped    FINDINGS: Tracheostomy tube tip projects over the mid trachea.  Surgical changes of lung transplant with stable left basilar pigtail  chest tube. Left PICC tip projects over the superior cavoatrial  junction. The heart size is within normal limits. Pulmonary  vasculature is indistinct. No significant change in the mixed  perihilar and bibasilar pulmonary opacities. Small bilateral pleural  effusions. No pneumothorax.      Impression    IMPRESSION: Bilateral lung transplant   1. Tracheostomy tube tip projects over the mid trachea, similar to  prior.  2. No significant change in the perihilar and bibasilar pulmonary  opacities. No pneumothorax.    I have personally reviewed the examination and initial interpretation  and I agree with the findings.    JODI MENDEZ MD         SYSTEM ID:  [id number]   XR Chest Port 1 View    Narrative    EXAM: XR CHEST PORT 1 VW 11/14/2021 1:52 AM    HISTORY: Trach placement    COMPARISON: 11/13/2020    FINDINGS:  Portable AP view of the chest. Interval tracheostomy  exchange with new tracheostomy tube tip projecting over the mid  trachea, 5.2 cm above the shiv. Left PICC tip projects over the  superior cavoatrial junction. Surgical changes of lung transplant with  stable left pigtail chest tube. Cardiomediastinal silhouette is  stable. Pulmonary vasculature is indistinct. Small bilateral pleural  effusions. No pneumothorax. Decreased bibasilar predominant pulmonary  opacities. Upper abdomen is unremarkable.      Impression    IMPRESSION: Bilateral lung transplant.   1. Tracheostomy tube exchange with tip projecting over the mid  trachea.  2. Decreased bibasilar predominant pulmonary opacities.     I have personally reviewed the examination and initial interpretation  and I agree with the findings.    JODI MENDEZ MD         SYSTEM ID:  [id number]   XR Chest Port 1 View    Narrative    Exam: XR CHEST PORT 1 VIEW, 11/15/2021 1:25 AM    Comparison: Chest x-ray 11/14/2021    History: s/p lung transplant    Findings:  A single portable AP semiupright view of the chest is obtained.  Tracheostomy tube tip is in the mid thoracic trachea. Left upper  extremity PICC tip is in the midsuperior vena cava. Left-sided chest  tube in place. Postoperative changes of bilateral lung  transplantation, clamshell sternotomy wires are intact.    Trachea is midline. Cardiomediastinum is unchanged.  Cardiopulmonary  silhouette is within normal limits. Similar appearance of basilar  predominant pulmonary opacities There is no pneumothorax or pleural  effusion.       Impression    Impression: Stable appearance of basilar predominant pulmonary  opacities.    I have personally reviewed the examination and initial interpretation  and I agree with the findings.    JOSEE ROMERO MD         SYSTEM ID:  [id number]

## 2021-11-15 NOTE — PROGRESS NOTES
[facility] at [facility] ([address])         At the request of Jennifer Pate RN CC, I reviewed the chart of Edson Thornton for potential admission to [facility] pending clinical readiness and bed availability. Currently the pt has appropriate care needs and goals for LTACH but would need to be off his propofol infusion for at least 24 hours prior to admission. I will continue to follow.      Sandra Carrera RN  LTACH Referral Specialist  [address] [address]  [email or web address]    [email or web address]  Office: [phone]  Fax: [phone]     CONFIDENTIAL Protected under [place] Statute  145.61 et seq

## 2021-11-15 NOTE — PLAN OF CARE
"Major Shift Events:  Pt increasingly anxious and short of breath this AM with increased RR and O2 needs and bradycardia to HR 40s-50s. Pt changed from pressure support to CMV on vent, medication initiated for anxiety/delirium/sedation, VBG, chest x ray, troponin and EKG done.     Labs/Protocols: K, Mag, Phos. Mag replaced x1. Hgb stable.   Neuro: Sedated. RASS -2. Inconsistent in following commands. PERRLA. Afebrile. Denies pain. Pt does nod head \"yes\" to feeling anxious, PRN atarax Q 6, given x2 this shift. PRN ativan Q 6 hours, given x1 this shift.   Cardiac: SB/SR. MAP goal >65. PRN hydralazine for SBP >140, given x1.   Respiratory:  Bivonna 6 Trach. CMV 50%, 460, 12, 8 (FiO2 increased from 40%, PEEP increased from 5). Coarse/diminished lung sounds throughout. Minimal thin secretions.   GI/: Watson due to retention. One time dose IV diamox and 80mg lasix given this shift with fair response. Rectal tube in place with large amount of loose/watery stool, 800cc this shift). G tube to gravity drainage, 45cc bilious drainage out this shift. J tube with TF at goal of 45.   Skin: Clamshell incision, KIM-healing. Scattered bruising.   LDAs: L triple lumen PICC. R PIV.   GTTs: Heparin, propofol, and fentanyl  Diet: NPO. TF at 45cc/hr with 50cc water Q 4 hours.   Activity: Ax2 with lift. Up to chair for 2 hours this shift. Worked with PT using moveo this shift.   Teaching: Patient educated on plan of care and cares throughout shift.     Plan: Continue IV antibiotics and chest tube for infection treatment. Continue to monitor.    For vital signs and complete assessments, please see documentation flowsheets.     "

## 2021-11-15 NOTE — PROGRESS NOTES
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 Corral. 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  UGIB causing code blue on 11/2.     OVERNIGHT EVENTS:  - NAEO    TODAY'S PROGRESS:   -Trach Dome today as tolerated  - Start Seroquel for delerium  - CMV settings overnight  - Schedule Levalbuterol nebs   - Start Zosyn    PLAN:  Neuro/ pain/ sedation:  Acute postoperative pain   Anxiety and depression  Acute to subacute CVA, b/l cerebral hemispheres and L cerebellum, suspect embolic  Encephalopathy and diffuse weakness - improving slightly   R ear lateral canal floor excoriation with bleeding - resolved   - Tylenol and oxycodone prn, lido patch   - PTA lexapro  - Atarax PRN   - CAM ICU positive   - start Seroquel 25 BID  - Ativan 1mg    Pulmonary care:   SALTY on home CPAP  Acute hypoxic respiratory failure s/p b/l lung transplant 10/16/21  S/p tracheostomy 10/29  Empyema with Candida s/p chest tube 11/3/21  - mechanically ventilated via trach, PST, trach dome   - CMV overnight  - VBG after trach dome  - Levalbuterol nebs scheduled and Mucomyst, chest PT  - Daily CXR  - appreciate Pulmonary    Cardiovascular:    HTN  Afib   PFO  Vasoplegic shock in setting of hypovolemia - resolved  - Monitor hemodynamic status.   - MAP goal <100, SBP <140  - metoprolol 75 BID    - prn labetalol  - statin  - Amiodarone 200 mg daily (end date 11/27/21)  - low-intensity heparin gtt     GI care:   Elevated LFTs - recurrent  GI bleed 2/2 NG trauma,  now recurrent secondary to GJ bumper ulcer   Moderate malnutrition in the context of acute on chronic illness  PEG-J, with malpositioned J tube   - Diet: TF   - PPI bid    Fluids/ Electrolytes/ Nutrition:   Hypernatremia  Hyperkalemia, resolved   BL creat appears to be ~ 0.7  - free water flushes  - Strict I/O, daily weights  - Avoid/limit nephrotoxins as able  - Replete lytes PRN per protocol     Endocrine:    Stress induced hyperglycemia with steroid-induced component, on DM2  Hypothyroidism  RA  Preop A1c 6.6  - Discontinue insulin gtt. High sliding scale insulin    - Goal BG <180 for optimal healing  - continue home synthroid  - lantus 30    ID/ Antibiotics:  Immunosuppression s/p transplant  Candidal infection of donor lungs (likely source), pleural fluid, and fungemia   PNA  - NGTD CSF. Last + blood cx 10/22. No vegetations on valves per TC 10/23  - Nystatin, Acyclovir, bactrim   - Tacrolimus (via g tube), prednisone    - appreciate transplant ID, ophthalmology    - d/w ID, no indication to treat Staph epi in sputum   - Fluconazole for fungemia (end date 11/25)  - Stop ertapenem, Ceftriaxone for klebsiella PNA  - Pseudomonas positive culture  start Zosyn    Heme:     Acute blood loss anemia secondary to surgery and GI bleed, and anemia related to critical illness and iron deficiency    Hypogammaglobulinemia s/p IVIG  Thrombocytopenia, HIT negative - resolved   Lactic acidosis, resolved  Nonocclusive R subclavian thrombus    - heparin low intensity     Prophylaxis:    - Mechanical prophylaxis for DVT: SCDs  - Chemical DVT prophylaxis: heparin low intensity gtt    - PPI    Lines/ tubes/ drains:  - trach  - peg-j  - Watson 10/25; replaced 11/14  - PIVs  - chest tube L  - L PICC      Disposition:  - [facility]    Patient seen, findings and plan discussed with CV ICU staff    Aditya Jo  Medical Student    I saw and evaluated this patient with our medical student, Aditya Jo, on 11/15/21. I agree with the note and above  plan as edited by me where appropriate.    Tammie Perez MD (PGY-3)  General Surgery      ====================================    SUBJECTIVE:   NAEO, Patient sitting in bed appears anxious, endorses feeling anxious, tachypnic    OBJECTIVE:   1. VITAL SIGNS:   Temp:  [96.8  F (36  C)-99.1  F (37.3  C)] 98.2  F (36.8  C)  Pulse:  [59-90] 63  Resp:  [15-25] 15  BP: ()/() 136/85  FiO2 (%):  [40 %-50 %] 40 %  SpO2:  [92 %-100 %] 99 %  Ventilation Mode: CPAP/PS  (Continuous positive airway pressure with Pressure Support)  FiO2 (%): 40 %  Rate Set (breaths/minute): 12 breaths/min  Tidal Volume Set (mL): 400 mL  PEEP (cm H2O): 5 cmH2O  Pressure Support (cm H2O): 10 cmH2O  Oxygen Concentration (%): 40 %  Resp: 15      2. INTAKE/ OUTPUT:   I/O last 3 completed shifts:  In: 2459 [I.V.:594; NG/GT:785]  Out: 2405 [Urine:1525; Emesis/NG output:105; Stool:700; Chest Tube:75]    3. PHYSICAL EXAMINATION:   General: Alert individual sitting in bed, delerious  Neuro: Alert and Oriented, appears to be anxious, endorses increased anxiousness  Resp: Trach present, tachypnic  CV: RRR  Abdomen: Soft, Non-distended, Non-tender  Incisions: c/d/i  Extremities: warm and well perfused    4. INVESTIGATIONS:   Arterial Blood Gases   Recent Labs   Lab 11/11/21  1740   PH 7.48*   PCO2 37   PO2 106*   HCO3 28     Complete Blood Count   Recent Labs   Lab 11/15/21  0344 11/14/21  0258 11/13/21  0340 11/12/21  0316   WBC 11.2* 16.9* 16.4* 12.1*   HGB 9.7* 9.9* 9.0* 8.5*    176 169 162     Basic Metabolic Panel  Recent Labs   Lab 11/15/21  0344 11/14/21  2310 11/14/21  1940 11/14/21  1536 11/14/21  0307 11/14/21  0258 11/13/21  1522 11/13/21  1521     140  --   --  140  --  140  140  --  142   POTASSIUM 4.6  4.6  --   --  4.9  --  4.4  4.4  --  4.3   CHLORIDE 106  106  --   --  107  --  105  105  --  106   CO2 31  31  --   --  29  --  30  30  --  29   BUN 37*  37*  --   --  40*  --  47*  47*  --  42*   CR 0.47*  0.47*   --   --  0.54*  --  0.63*  0.63*  --  0.68   *  191* 162* 132* 179*  162*   < > 172*  172*   < > 184*    < > = values in this interval not displayed.     Liver Function Tests  Recent Labs   Lab 11/15/21  0344 11/14/21  0258 11/13/21  0340 11/12/21  0316   AST 16 18 16 13   ALT 33 33 33 34   ALKPHOS 150 160* 144 138   BILITOTAL 0.2 0.2 0.3 0.2   ALBUMIN 2.1* 2.1* 2.0* 1.7*   INR 1.15 1.17* 1.10 1.10     Pancreatic Enzymes  No lab results found in last 7 days.  Coagulation Profile  Recent Labs   Lab 11/15/21  0344 11/14/21  0258 11/13/21  0340 11/12/21  0316   INR 1.15 1.17* 1.10 1.10         5. RADIOLOGY:   Recent Results (from the past 24 hour(s))   XR Chest Port 1 View    Narrative    Exam: XR CHEST PORT 1 VIEW, 11/15/2021 1:25 AM    Comparison: Chest x-ray 11/14/2021    History: s/p lung transplant    Findings:  A single portable AP semiupright view of the chest is obtained.  Tracheostomy tube tip is in the mid thoracic trachea. Left upper  extremity PICC tip is in the midsuperior vena cava. Left-sided chest  tube in place. Postoperative changes of bilateral lung  transplantation, clamshell sternotomy wires are intact.    Trachea is midline. Cardiomediastinum is unchanged.  Cardiopulmonary  silhouette is within normal limits. Similar appearance of basilar  predominant pulmonary opacities There is no pneumothorax or pleural  effusion.       Impression    Impression: Stable appearance of basilar predominant pulmonary  opacities.    I have personally reviewed the examination and initial interpretation  and I agree with the findings.    JOSEE ROMERO MD         SYSTEM ID:  [id number]       =========================================

## 2021-11-16 ENCOUNTER — APPOINTMENT (OUTPATIENT)
Dept: GENERAL RADIOLOGY | Facility: CLINIC | Age: 56
End: 2021-11-16
Attending: STUDENT IN AN ORGANIZED HEALTH CARE EDUCATION/TRAINING PROGRAM
Payer: COMMERCIAL

## 2021-11-16 ENCOUNTER — APPOINTMENT (OUTPATIENT)
Dept: OCCUPATIONAL THERAPY | Facility: CLINIC | Age: 56
End: 2021-11-16
Attending: STUDENT IN AN ORGANIZED HEALTH CARE EDUCATION/TRAINING PROGRAM
Payer: COMMERCIAL

## 2021-11-16 ENCOUNTER — APPOINTMENT (OUTPATIENT)
Dept: PHYSICAL THERAPY | Facility: CLINIC | Age: 56
End: 2021-11-16
Attending: STUDENT IN AN ORGANIZED HEALTH CARE EDUCATION/TRAINING PROGRAM
Payer: COMMERCIAL

## 2021-11-16 LAB
ABO/RH(D): NORMAL
ALBUMIN SERPL-MCNC: 2.1 G/DL (ref 3.4–5)
ALP SERPL-CCNC: 177 U/L (ref 40–150)
ALT SERPL W P-5'-P-CCNC: 35 U/L (ref 0–70)
ANION GAP SERPL CALCULATED.3IONS-SCNC: 12 MMOL/L (ref 3–14)
ANION GAP SERPL CALCULATED.3IONS-SCNC: 9 MMOL/L (ref 3–14)
ANION GAP SERPL CALCULATED.3IONS-SCNC: 9 MMOL/L (ref 3–14)
ANTIBODY SCREEN: NEGATIVE
AST SERPL W P-5'-P-CCNC: 17 U/L (ref 0–45)
ATRIAL RATE - MUSE: 77 BPM
ATRIAL RATE - MUSE: 83 BPM
BILIRUB SERPL-MCNC: 0.2 MG/DL (ref 0.2–1.3)
BUN SERPL-MCNC: 41 MG/DL (ref 7–30)
BUN SERPL-MCNC: 44 MG/DL (ref 7–30)
BUN SERPL-MCNC: 44 MG/DL (ref 7–30)
CALCIUM SERPL-MCNC: 7.4 MG/DL (ref 8.5–10.1)
CALCIUM SERPL-MCNC: 9.1 MG/DL (ref 8.5–10.1)
CALCIUM SERPL-MCNC: 9.1 MG/DL (ref 8.5–10.1)
CHLORIDE BLD-SCNC: 102 MMOL/L (ref 94–109)
CHLORIDE BLD-SCNC: 102 MMOL/L (ref 94–109)
CHLORIDE BLD-SCNC: 107 MMOL/L (ref 94–109)
CMV DNA SPEC NAA+PROBE-ACNC: NOT DETECTED IU/ML
CO2 SERPL-SCNC: 24 MMOL/L (ref 20–32)
CO2 SERPL-SCNC: 28 MMOL/L (ref 20–32)
CO2 SERPL-SCNC: 28 MMOL/L (ref 20–32)
CREAT SERPL-MCNC: 0.62 MG/DL (ref 0.66–1.25)
CREAT SERPL-MCNC: 0.62 MG/DL (ref 0.66–1.25)
CREAT SERPL-MCNC: 0.72 MG/DL (ref 0.66–1.25)
DIASTOLIC BLOOD PRESSURE - MUSE: NORMAL MMHG
DIASTOLIC BLOOD PRESSURE - MUSE: NORMAL MMHG
DONOR IDENTIFICATION: NORMAL
DSA COMMENTS: NORMAL
DSA PRESENT: NO
DSA TEST METHOD: NORMAL
EBV DNA # SPEC NAA+PROBE: NOT DETECTED COPIES/ML
ERYTHROCYTE [DISTWIDTH] IN BLOOD BY AUTOMATED COUNT: 17.2 % (ref 10–15)
GFR SERPL CREATININE-BSD FRML MDRD: >90 ML/MIN/1.73M2
GLUCOSE BLD-MCNC: 216 MG/DL (ref 70–99)
GLUCOSE BLD-MCNC: 216 MG/DL (ref 70–99)
GLUCOSE BLD-MCNC: 234 MG/DL (ref 70–99)
GLUCOSE BLDC GLUCOMTR-MCNC: 153 MG/DL (ref 70–99)
GLUCOSE BLDC GLUCOMTR-MCNC: 160 MG/DL (ref 70–99)
GLUCOSE BLDC GLUCOMTR-MCNC: 189 MG/DL (ref 70–99)
GLUCOSE BLDC GLUCOMTR-MCNC: 197 MG/DL (ref 70–99)
GLUCOSE BLDC GLUCOMTR-MCNC: 200 MG/DL (ref 70–99)
GLUCOSE BLDC GLUCOMTR-MCNC: 204 MG/DL (ref 70–99)
HCT VFR BLD AUTO: 30.5 % (ref 40–53)
HGB BLD-MCNC: 9.5 G/DL (ref 13.3–17.7)
INR PPP: 1.18 (ref 0.85–1.15)
INTERPRETATION ECG - MUSE: NORMAL
INTERPRETATION ECG - MUSE: NORMAL
MAGNESIUM SERPL-MCNC: 1.7 MG/DL (ref 1.6–2.3)
MCH RBC QN AUTO: 30.8 PG (ref 26.5–33)
MCHC RBC AUTO-ENTMCNC: 31.1 G/DL (ref 31.5–36.5)
MCV RBC AUTO: 99 FL (ref 78–100)
ORGAN: NORMAL
P AXIS - MUSE: 71 DEGREES
P AXIS - MUSE: 75 DEGREES
PHOSPHATE SERPL-MCNC: 5.4 MG/DL (ref 2.5–4.5)
PLATELET # BLD AUTO: 141 10E3/UL (ref 150–450)
POTASSIUM BLD-SCNC: 3.4 MMOL/L (ref 3.4–5.3)
POTASSIUM BLD-SCNC: 4 MMOL/L (ref 3.4–5.3)
POTASSIUM BLD-SCNC: 4 MMOL/L (ref 3.4–5.3)
PR INTERVAL - MUSE: 160 MS
PR INTERVAL - MUSE: 164 MS
PROT SERPL-MCNC: 5.4 G/DL (ref 6.8–8.8)
QRS DURATION - MUSE: 76 MS
QRS DURATION - MUSE: 88 MS
QT - MUSE: 364 MS
QT - MUSE: 416 MS
QTC - MUSE: 427 MS
QTC - MUSE: 470 MS
R AXIS - MUSE: 54 DEGREES
R AXIS - MUSE: 58 DEGREES
RBC # BLD AUTO: 3.08 10E6/UL (ref 4.4–5.9)
SA 1 CELL: NORMAL
SA 1 TEST METHOD: NORMAL
SA 2 CELL: NORMAL
SA 2 TEST METHOD: NORMAL
SA1 HI RISK ABY: NORMAL
SA1 MOD RISK ABY: NORMAL
SA2 HI RISK ABY: NORMAL
SA2 MOD RISK ABY: NORMAL
SODIUM SERPL-SCNC: 139 MMOL/L (ref 133–144)
SODIUM SERPL-SCNC: 139 MMOL/L (ref 133–144)
SODIUM SERPL-SCNC: 143 MMOL/L (ref 133–144)
SPECIMEN EXPIRATION DATE: NORMAL
SYSTOLIC BLOOD PRESSURE - MUSE: NORMAL MMHG
SYSTOLIC BLOOD PRESSURE - MUSE: NORMAL MMHG
T AXIS - MUSE: 236 DEGREES
T AXIS - MUSE: 239 DEGREES
TACROLIMUS BLD-MCNC: 9.5 UG/L (ref 5–15)
TME LAST DOSE: NORMAL H
TME LAST DOSE: NORMAL H
UFH PPP CHRO-ACNC: 0.46 IU/ML
UNACCEPTABLE ANTIGENS: NORMAL
UNOS CPRA: 0
VENTRICULAR RATE- MUSE: 77 BPM
VENTRICULAR RATE- MUSE: 83 BPM
WBC # BLD AUTO: 14.3 10E3/UL (ref 4–11)
ZZZSA 1  COMMENTS: NORMAL
ZZZSA 2 COMMENTS: NORMAL

## 2021-11-16 PROCEDURE — 97110 THERAPEUTIC EXERCISES: CPT | Mod: GO

## 2021-11-16 PROCEDURE — 86900 BLOOD TYPING SEROLOGIC ABO: CPT | Performed by: STUDENT IN AN ORGANIZED HEALTH CARE EDUCATION/TRAINING PROGRAM

## 2021-11-16 PROCEDURE — 250N000009 HC RX 250: Performed by: STUDENT IN AN ORGANIZED HEALTH CARE EDUCATION/TRAINING PROGRAM

## 2021-11-16 PROCEDURE — 250N000012 HC RX MED GY IP 250 OP 636 PS 637: Performed by: NURSE PRACTITIONER

## 2021-11-16 PROCEDURE — 250N000011 HC RX IP 250 OP 636: Performed by: THORACIC SURGERY (CARDIOTHORACIC VASCULAR SURGERY)

## 2021-11-16 PROCEDURE — 250N000013 HC RX MED GY IP 250 OP 250 PS 637: Performed by: ANESTHESIOLOGY

## 2021-11-16 PROCEDURE — 87799 DETECT AGENT NOS DNA QUANT: CPT | Performed by: STUDENT IN AN ORGANIZED HEALTH CARE EDUCATION/TRAINING PROGRAM

## 2021-11-16 PROCEDURE — 250N000011 HC RX IP 250 OP 636

## 2021-11-16 PROCEDURE — 94640 AIRWAY INHALATION TREATMENT: CPT

## 2021-11-16 PROCEDURE — 97530 THERAPEUTIC ACTIVITIES: CPT | Mod: GP

## 2021-11-16 PROCEDURE — 94640 AIRWAY INHALATION TREATMENT: CPT | Mod: 76

## 2021-11-16 PROCEDURE — 250N000013 HC RX MED GY IP 250 OP 250 PS 637

## 2021-11-16 PROCEDURE — 85610 PROTHROMBIN TIME: CPT | Performed by: THORACIC SURGERY (CARDIOTHORACIC VASCULAR SURGERY)

## 2021-11-16 PROCEDURE — 250N000012 HC RX MED GY IP 250 OP 636 PS 637: Performed by: INTERNAL MEDICINE

## 2021-11-16 PROCEDURE — 250N000013 HC RX MED GY IP 250 OP 250 PS 637: Performed by: THORACIC SURGERY (CARDIOTHORACIC VASCULAR SURGERY)

## 2021-11-16 PROCEDURE — 93010 ELECTROCARDIOGRAM REPORT: CPT | Mod: 76 | Performed by: INTERNAL MEDICINE

## 2021-11-16 PROCEDURE — 93005 ELECTROCARDIOGRAM TRACING: CPT

## 2021-11-16 PROCEDURE — 250N000013 HC RX MED GY IP 250 OP 250 PS 637: Performed by: STUDENT IN AN ORGANIZED HEALTH CARE EDUCATION/TRAINING PROGRAM

## 2021-11-16 PROCEDURE — 200N000002 HC R&B ICU UMMC

## 2021-11-16 PROCEDURE — 71045 X-RAY EXAM CHEST 1 VIEW: CPT | Mod: 26 | Performed by: RADIOLOGY

## 2021-11-16 PROCEDURE — 250N000013 HC RX MED GY IP 250 OP 250 PS 637: Performed by: NURSE PRACTITIONER

## 2021-11-16 PROCEDURE — 71045 X-RAY EXAM CHEST 1 VIEW: CPT

## 2021-11-16 PROCEDURE — 97530 THERAPEUTIC ACTIVITIES: CPT | Mod: GO

## 2021-11-16 PROCEDURE — 85520 HEPARIN ASSAY: CPT | Performed by: STUDENT IN AN ORGANIZED HEALTH CARE EDUCATION/TRAINING PROGRAM

## 2021-11-16 PROCEDURE — 250N000012 HC RX MED GY IP 250 OP 636 PS 637: Performed by: PHYSICIAN ASSISTANT

## 2021-11-16 PROCEDURE — 84100 ASSAY OF PHOSPHORUS: CPT | Performed by: THORACIC SURGERY (CARDIOTHORACIC VASCULAR SURGERY)

## 2021-11-16 PROCEDURE — 250N000011 HC RX IP 250 OP 636: Performed by: STUDENT IN AN ORGANIZED HEALTH CARE EDUCATION/TRAINING PROGRAM

## 2021-11-16 PROCEDURE — 999N000157 HC STATISTIC RCP TIME EA 10 MIN

## 2021-11-16 PROCEDURE — 99233 SBSQ HOSP IP/OBS HIGH 50: CPT | Mod: 24 | Performed by: INTERNAL MEDICINE

## 2021-11-16 PROCEDURE — 80048 BASIC METABOLIC PNL TOTAL CA: CPT

## 2021-11-16 PROCEDURE — 94003 VENT MGMT INPAT SUBQ DAY: CPT

## 2021-11-16 PROCEDURE — 80197 ASSAY OF TACROLIMUS: CPT | Performed by: INTERNAL MEDICINE

## 2021-11-16 PROCEDURE — 97535 SELF CARE MNGMENT TRAINING: CPT | Mod: GO

## 2021-11-16 PROCEDURE — 272N000272 HC CONTINUOUS NEBULIZER MICRO PUMP

## 2021-11-16 PROCEDURE — 85027 COMPLETE CBC AUTOMATED: CPT | Performed by: THORACIC SURGERY (CARDIOTHORACIC VASCULAR SURGERY)

## 2021-11-16 PROCEDURE — 83735 ASSAY OF MAGNESIUM: CPT | Performed by: THORACIC SURGERY (CARDIOTHORACIC VASCULAR SURGERY)

## 2021-11-16 PROCEDURE — 94681 O2 UPTK CO2 OUTP % O2 XTRC: CPT

## 2021-11-16 PROCEDURE — 250N000011 HC RX IP 250 OP 636: Performed by: INTERNAL MEDICINE

## 2021-11-16 PROCEDURE — 258N000003 HC RX IP 258 OP 636: Performed by: THORACIC SURGERY (CARDIOTHORACIC VASCULAR SURGERY)

## 2021-11-16 PROCEDURE — 99207 PR NON-BILLABLE SERV PER CHARTING: CPT | Performed by: PHYSICIAN ASSISTANT

## 2021-11-16 PROCEDURE — 94668 MNPJ CHEST WALL SBSQ: CPT

## 2021-11-16 RX ORDER — PREDNISONE 10 MG/1
10 TABLET ORAL EVERY EVENING
Status: COMPLETED | OUTPATIENT
Start: 2021-11-21 | End: 2021-12-04

## 2021-11-16 RX ORDER — QUETIAPINE FUMARATE 25 MG/1
25 TABLET, FILM COATED ORAL 3 TIMES DAILY PRN
Status: DISCONTINUED | OUTPATIENT
Start: 2021-11-16 | End: 2021-12-13 | Stop reason: HOSPADM

## 2021-11-16 RX ORDER — POTASSIUM CHLORIDE 29.8 MG/ML
20 INJECTION INTRAVENOUS ONCE
Status: COMPLETED | OUTPATIENT
Start: 2021-11-16 | End: 2021-11-16

## 2021-11-16 RX ORDER — WARFARIN SODIUM 2 MG/1
2 TABLET ORAL
Status: COMPLETED | OUTPATIENT
Start: 2021-11-16 | End: 2021-11-16

## 2021-11-16 RX ORDER — MAGNESIUM SULFATE HEPTAHYDRATE 40 MG/ML
2 INJECTION, SOLUTION INTRAVENOUS ONCE
Status: COMPLETED | OUTPATIENT
Start: 2021-11-16 | End: 2021-11-16

## 2021-11-16 RX ADMIN — ACETYLCYSTEINE 2 ML: 200 SOLUTION ORAL; RESPIRATORY (INHALATION) at 16:24

## 2021-11-16 RX ADMIN — LEVALBUTEROL HYDROCHLORIDE 1.25 MG: 1.25 SOLUTION RESPIRATORY (INHALATION) at 09:42

## 2021-11-16 RX ADMIN — LEVOTHYROXINE SODIUM 25 MCG: 0.03 TABLET ORAL at 08:29

## 2021-11-16 RX ADMIN — LEVALBUTEROL HYDROCHLORIDE 1.25 MG: 1.25 SOLUTION RESPIRATORY (INHALATION) at 12:13

## 2021-11-16 RX ADMIN — INSULIN ASPART 3 UNITS: 100 INJECTION, SOLUTION INTRAVENOUS; SUBCUTANEOUS at 04:12

## 2021-11-16 RX ADMIN — ACETYLCYSTEINE 2 ML: 200 SOLUTION ORAL; RESPIRATORY (INHALATION) at 19:54

## 2021-11-16 RX ADMIN — PIPERACILLIN SODIUM AND TAZOBACTAM SODIUM 4.5 G: 4; .5 INJECTION, POWDER, LYOPHILIZED, FOR SOLUTION INTRAVENOUS at 09:08

## 2021-11-16 RX ADMIN — CALCIUM CARBONATE 600 MG (1,500 MG)-VITAMIN D3 400 UNIT TABLET 1 TABLET: at 08:29

## 2021-11-16 RX ADMIN — HYDROXYZINE HYDROCHLORIDE 50 MG: 50 TABLET, FILM COATED ORAL at 05:52

## 2021-11-16 RX ADMIN — INSULIN ASPART 3 UNITS: 100 INJECTION, SOLUTION INTRAVENOUS; SUBCUTANEOUS at 16:00

## 2021-11-16 RX ADMIN — HEPARIN SODIUM 1100 UNITS/HR: 1000 INJECTION INTRAVENOUS; SUBCUTANEOUS at 02:34

## 2021-11-16 RX ADMIN — PROPOFOL 15 MCG/KG/MIN: 10 INJECTION, EMULSION INTRAVENOUS at 16:04

## 2021-11-16 RX ADMIN — Medication 40 MG: at 20:41

## 2021-11-16 RX ADMIN — LEVALBUTEROL HYDROCHLORIDE 1.25 MG: 1.25 SOLUTION RESPIRATORY (INHALATION) at 19:53

## 2021-11-16 RX ADMIN — Medication 40 MG: at 08:28

## 2021-11-16 RX ADMIN — AMIODARONE HYDROCHLORIDE 200 MG: 200 TABLET ORAL at 08:29

## 2021-11-16 RX ADMIN — SODIUM CHLORIDE, PRESERVATIVE FREE 5 ML: 5 INJECTION INTRAVENOUS at 17:34

## 2021-11-16 RX ADMIN — HYDROXYZINE HYDROCHLORIDE 50 MG: 50 TABLET, FILM COATED ORAL at 19:41

## 2021-11-16 RX ADMIN — PREDNISOLONE 12.5 MG: 15 SOLUTION ORAL at 19:41

## 2021-11-16 RX ADMIN — FLUCONAZOLE IN SODIUM CHLORIDE 400 MG: 2 INJECTION, SOLUTION INTRAVENOUS at 11:57

## 2021-11-16 RX ADMIN — ACETYLCYSTEINE 2 ML: 200 SOLUTION ORAL; RESPIRATORY (INHALATION) at 12:12

## 2021-11-16 RX ADMIN — NYSTATIN 1000000 UNITS: 500000 SUSPENSION ORAL at 15:40

## 2021-11-16 RX ADMIN — PIPERACILLIN SODIUM AND TAZOBACTAM SODIUM 4.5 G: 4; .5 INJECTION, POWDER, LYOPHILIZED, FOR SOLUTION INTRAVENOUS at 20:48

## 2021-11-16 RX ADMIN — PIPERACILLIN SODIUM AND TAZOBACTAM SODIUM 4.5 G: 4; .5 INJECTION, POWDER, LYOPHILIZED, FOR SOLUTION INTRAVENOUS at 02:35

## 2021-11-16 RX ADMIN — MYCOPHENOLATE MOFETIL 1000 MG: 200 POWDER, FOR SUSPENSION ORAL at 08:29

## 2021-11-16 RX ADMIN — INSULIN ASPART 3 UNITS: 100 INJECTION, SOLUTION INTRAVENOUS; SUBCUTANEOUS at 00:01

## 2021-11-16 RX ADMIN — NYSTATIN 1000000 UNITS: 500000 SUSPENSION ORAL at 08:28

## 2021-11-16 RX ADMIN — ESCITALOPRAM 5 MG: 5 TABLET, FILM COATED ORAL at 08:28

## 2021-11-16 RX ADMIN — Medication 10 MG: at 19:41

## 2021-11-16 RX ADMIN — MYCOPHENOLATE MOFETIL 1000 MG: 200 POWDER, FOR SUSPENSION ORAL at 19:41

## 2021-11-16 RX ADMIN — ACETYLCYSTEINE 2 ML: 200 SOLUTION ORAL; RESPIRATORY (INHALATION) at 09:42

## 2021-11-16 RX ADMIN — QUETIAPINE FUMARATE 25 MG: 25 TABLET ORAL at 17:31

## 2021-11-16 RX ADMIN — PREDNISOLONE 12.5 MG: 15 SOLUTION ORAL at 08:28

## 2021-11-16 RX ADMIN — SULFAMETHOXAZOLE AND TRIMETHOPRIM 80 MG: 200; 40 SUSPENSION ORAL at 08:28

## 2021-11-16 RX ADMIN — Medication 1 PACKET: at 08:29

## 2021-11-16 RX ADMIN — PIPERACILLIN SODIUM AND TAZOBACTAM SODIUM 4.5 G: 4; .5 INJECTION, POWDER, LYOPHILIZED, FOR SOLUTION INTRAVENOUS at 15:40

## 2021-11-16 RX ADMIN — HYDROXYZINE HYDROCHLORIDE 50 MG: 50 TABLET, FILM COATED ORAL at 12:44

## 2021-11-16 RX ADMIN — FUROSEMIDE 40 MG: 10 INJECTION, SOLUTION INTRAVENOUS at 11:57

## 2021-11-16 RX ADMIN — PROPOFOL 10 MCG/KG/MIN: 10 INJECTION, EMULSION INTRAVENOUS at 05:09

## 2021-11-16 RX ADMIN — FUROSEMIDE 40 MG: 10 INJECTION, SOLUTION INTRAVENOUS at 19:04

## 2021-11-16 RX ADMIN — QUETIAPINE FUMARATE 25 MG: 25 TABLET ORAL at 08:28

## 2021-11-16 RX ADMIN — POTASSIUM CHLORIDE 20 MEQ: 29.8 INJECTION, SOLUTION INTRAVENOUS at 17:31

## 2021-11-16 RX ADMIN — INSULIN ASPART 2 UNITS: 100 INJECTION, SOLUTION INTRAVENOUS; SUBCUTANEOUS at 23:57

## 2021-11-16 RX ADMIN — LEVALBUTEROL HYDROCHLORIDE 1.25 MG: 1.25 SOLUTION RESPIRATORY (INHALATION) at 16:24

## 2021-11-16 RX ADMIN — MAGNESIUM SULFATE IN WATER 2 G: 40 INJECTION, SOLUTION INTRAVENOUS at 05:24

## 2021-11-16 RX ADMIN — FUROSEMIDE 40 MG: 10 INJECTION, SOLUTION INTRAVENOUS at 02:34

## 2021-11-16 RX ADMIN — TACROLIMUS 2 MG: 5 CAPSULE ORAL at 08:28

## 2021-11-16 RX ADMIN — TACROLIMUS 2 MG: 5 CAPSULE ORAL at 17:34

## 2021-11-16 RX ADMIN — INSULIN ASPART 2 UNITS: 100 INJECTION, SOLUTION INTRAVENOUS; SUBCUTANEOUS at 08:56

## 2021-11-16 RX ADMIN — WARFARIN SODIUM 2 MG: 2 TABLET ORAL at 17:33

## 2021-11-16 RX ADMIN — QUETIAPINE FUMARATE 25 MG: 25 TABLET ORAL at 19:41

## 2021-11-16 RX ADMIN — Medication 1 PACKET: at 19:40

## 2021-11-16 RX ADMIN — POTASSIUM CHLORIDE 40 MEQ: 40 SOLUTION ORAL at 08:29

## 2021-11-16 RX ADMIN — INSULIN ASPART 1 UNITS: 100 INJECTION, SOLUTION INTRAVENOUS; SUBCUTANEOUS at 19:41

## 2021-11-16 RX ADMIN — QUETIAPINE FUMARATE 25 MG: 25 TABLET ORAL at 12:44

## 2021-11-16 RX ADMIN — ROSUVASTATIN CALCIUM 10 MG: 10 TABLET, FILM COATED ORAL at 08:28

## 2021-11-16 RX ADMIN — Medication 1 PACKET: at 15:40

## 2021-11-16 RX ADMIN — CALCIUM CARBONATE 600 MG (1,500 MG)-VITAMIN D3 400 UNIT TABLET 1 TABLET: at 17:31

## 2021-11-16 RX ADMIN — INSULIN ASPART 1 UNITS: 100 INJECTION, SOLUTION INTRAVENOUS; SUBCUTANEOUS at 11:58

## 2021-11-16 RX ADMIN — MULTIVIT AND MINERALS-FERROUS GLUCONATE 9 MG IRON/15 ML ORAL LIQUID 15 ML: at 08:29

## 2021-11-16 RX ADMIN — NYSTATIN 1000000 UNITS: 500000 SUSPENSION ORAL at 11:57

## 2021-11-16 RX ADMIN — NYSTATIN 1000000 UNITS: 500000 SUSPENSION ORAL at 19:41

## 2021-11-16 RX ADMIN — ALTEPLASE 1 MG: 2.2 INJECTION, POWDER, LYOPHILIZED, FOR SOLUTION INTRAVENOUS at 10:16

## 2021-11-16 ASSESSMENT — ACTIVITIES OF DAILY LIVING (ADL)
ADLS_ACUITY_SCORE: 23
ADLS_ACUITY_SCORE: 25
ADLS_ACUITY_SCORE: 23
ADLS_ACUITY_SCORE: 25
ADLS_ACUITY_SCORE: 25
ADLS_ACUITY_SCORE: 23
ADLS_ACUITY_SCORE: 23
ADLS_ACUITY_SCORE: 25
ADLS_ACUITY_SCORE: 23
ADLS_ACUITY_SCORE: 25
ADLS_ACUITY_SCORE: 23
ADLS_ACUITY_SCORE: 25
ADLS_ACUITY_SCORE: 23
ADLS_ACUITY_SCORE: 25
ADLS_ACUITY_SCORE: 23
ADLS_ACUITY_SCORE: 25
ADLS_ACUITY_SCORE: 23
ADLS_ACUITY_SCORE: 23

## 2021-11-16 NOTE — PROVIDER NOTIFICATION
0915: CVTS notified of patient's increased anxiety and SOB.     0920: Pt's vent settings changed back to CMV by RT.     0930: CVTS notified of patient's continued SOB and anxiety following change in settings back to CMV, along with new bradycardia, hypertension and decrease in O2 sats (lowest noted: 86%). FiO2 increased to 50% per RN.     CVTS Fellow arrived to pt room to assess pt. MD changed vent settings (see flowsheets) and ordered ativan and additional hydralazine dose.     CVTS attending arrived to bedside and ordered chest x-ray, EKG and propofol gtt.

## 2021-11-16 NOTE — PROGRESS NOTES
CLINICAL NUTRITION SERVICES - REASSESSMENT NOTE     Nutrition Prescription    Malnutrition Status:    Severe malnutrition in the context of acute on chronic illness    Recommendations ordered by Registered Dietitian (RD):  Metabolic cart study ordered to best estimate MREE. No changed to nutrition support at this time.     Future Recommendations:  Adjust TF regimen pending propofol provisions.   Repeat metabolic cart study q week      EVALUATION OF THE PROGRESS TOWARD GOALS   Diet: NPO (vented)   Nutrition Support: Novasource Renal @ 45 ml/hr (1080 ml) + Prosource (1 pkt TID) provides 2280 kcal (36 kcal/kg), 98 g pro (2 g/kg), 198 g CHO, 774 ml free water, and 0+ g fiber daily. Micro/Nx: Certavite     Intake: Met 100% needs on avg.      NEW FINDINGS   Currently, receiving feeds at goal rate. Overall, received approx 2200 kcal/day and 95 g protein/day from avg TF infusion over the past 5-days. Received addtl ~175 kcal from propofol yesterday,     GI: 1L stool output yesterday, receiving fiber. C diff (-) on 10/27.  Renal: Lasix. Cr 0.62, K+ 4.0 and phos 5.4, FWF 50 ml q4h.   Endo: -210. Sliding scale insulin (high resistence), lantus (30 units/day), plan to increase today.   Wt trends: 63.5 kg today. Pt has lost ~10 kg (13%) based on admit weight. Given ongoing weight loss this admission, will update caloric needs.     Obtained metabolic cart study on 11/17 @ 1505. MREE = 2386 kcal/day (equiv to 37 kcal/kg) with RQ = 0.97. In the 24 hours preceding the study, pt received 2495 kcals (equiv to 39 kcal/kg or 105% MREE) from TF + propofol. RQ is within physiologic range and logical given provisions received prior to study. Would aim to meet nutrition needs at % MREE or 1900 - 2400 kcal/day (equiv to 30 - 37 kcal/kg).     UPDATED ASSESSED NUTRITION NEEDS   Based on dosing weight of 64 kg:   Estimated Energy Needs: 1900 - 2400 kcal/day (equiv to 30 - 37 kcal/kg)  Justification: % MREE   Estimated Protein  Needs: 85 - 130+ g protein/day (1.3 - 2+ g/kg/day)   Justification: Increased needs post-txp, repletion     MALNUTRITION  % Intake: No decreased intake noted  % Weight Loss: 13% in < 3 months (severe)  Subcutaneous Fat Loss: Facial region: Mild, Upper arm: Moderate, Lower arm: Mild and Thoracic/intercostal: Mild  Muscle Loss: Temporal: Mild, Scapular bone: Moderate, Thoracic region (clavicle, acromium bone, deltoid, trapezius, pectoral): Mild, Upper arm (bicep, tricep): Moderate, Lower arm  (forearm): Mild, Dorsal hand: Moderate, Upper leg (quadricep, hamstring): Mild, Patellar region: Mild and Posterior calf: Severe  Fluid Accumulation/Edema: None noted  Malnutrition Diagnosis: Severe malnutrition in the context of acute on chronic illness    Previous Goals  Total avg nutritional intake to meet a minimum of 30 kcal/kg and 1.3 g PRO/kg daily (per dosing wt 64 kg).  Evaluation: Met    Previous Nutrition Diagnosis  Inadequate protein-energy intake  Evaluation: Improving    CURRENT NUTRITION DIAGNOSIS  Malnutrition related to hx of inadequate nutrition intake as evidenced by wt loss, fat loss, muscle loss.        INTERVENTIONS  See interventions at top of progress note    Goals  Total avg nutritional intake to meet a minimum of 30 kcal/kg and 1.3 g PRO/kg daily (per dosing wt 64 kg).    Monitoring/Evaluation  Progress toward goals will be monitored and evaluated per protocol.    Sona Amezquita RD, LD  [id number]  Pgr: [id number]

## 2021-11-16 NOTE — PROGRESS NOTES
Pulmonary Medicine  Cystic Fibrosis - Lung Transplant Team  Progress Note  2021       Patient: Edson Thornton  MRN: [id number]  : 1965 (age 56 year old)  Transplant: 10/16/2021 (Lung), POD#31  Admission date: 2021    Assessment & Plan:     Edson Thornton is a 56 year old male with a PMH significant for NSIP/ILD, bronchiectasis, moderate PH, RA, SALTY, chronic HSV infection, hypogammaglobulinemia, steroid-induced diabetes, hypothyroidism, PFO, HTN, HLD, duodenal anomaly, anxiety, and depression.  Admitted on 21 from OSH for acute on chronic respiratory failure 2/2 ILD exacerbation, now s/p BSLT on 10/16/21.  Prolonged vent wean s/p trach and PEG/J tube placement with thoracic surgery 10/29.  Post-op course otherwise complicated by encephalopathy and diffuse weakness, acute to subacute CVA, afib with RVR, BRENNAN, GI bleed, and Candidemia/Candida empyema, and anxiety.  Code called  for bradycardia/asystole and progressively hypotensive, found to have acute drop in hemoglobin and bloody G tube output.  Intermittently tolerating PS/TD trials and with ongoing improvement in mentation and weakness.      Today's recommendations:    - TD/PS trials during the day per ICU team otherwise CMV between and overnight  - Diuresis per ICU team  - CXR daily while left chest tube in place  - Care conference for family update today at 1400  - Tacrolimus level therapeutic, no dose adjustment, repeat level ordered   - Prednisone taper ordered   - CMV and EBV () pending  - Coccidioides Ag ordered tomorrow  - Continue Zosyn for 2 week course  - Continue fluconazole (reevaluate around ), EKG weekly for QTc monitoring (, ordered)  - IgG ordered tomorrow  - DSA (11/15) pending  - PHS high risk donor risk labs (11/15) pending     S/p BSLT for ILD:  Acute hypoxic respiratory failure s/p trach:   Bilateral pleural effusions: Unfortunately had not received vaccination for flu, PNA, or COVID-19  PTA.  Explant pathology with NSIP, no malignancy.  PGD 2-3.  Weaned off paralytic 10/19 (for vent dyssynchrony) and Caroline 10/22.  Initial difficulty weaning sedation given agitation then with neurological findings as below.  Prolonged vent wean s/p trach and PEG/J tube placement with thoracic surgery 10/29.  Code called 11/2 for bradycardia/asystole (required 1 round of CPR, no medications) then progressively hypotensive, GI bleed as below.  Chest CT 11/2 with increased bilateral pleural effusions (moderate left, small right), bibasilar atelectasis with area of hypoenhancing parenchyma in LLL (suspicious for infection), and numerous nondisplaced anterior rib fractures bilaterally.  S/p left chest tube placement in IR 11/3 for pleural effusion/empyema (as below), right deferred given very little effusion on US.  Problems with trach cuff leak 11/3 (requiring multiple exchanges), bronch 11/14 with trach in good position with cuff well sealed in trachea and small to moderate secretions mostly in lower lobes.  Intermittently tolerating PS/TD trials, remained on PS overnight 11/14 to 11/15 although with increased anxiety and dyspnea.  - Nebs: levalbuterol and Mucomyst QID  - Aggressive pulmonary toilet with chest physiotherapy QID  - TD/PS trials during the day per ICU team otherwise CMV between and overnight  - Diuresis per ICU team  - CXR daily while left chest tube in place, today with slight increase in right basilar opacity, trace bilateral pleural effusions with associated atelectasis, and no appreciable PTX (personally reviewed with Dr. Wells)  - Repeat bronch not indicated today, revisit prn  - Care conference for family update 11/16 at 1400     Immunosuppression: s/p induction therapy with basiliximab 10/16 (and high dose IV steroid) and 10/20  - Tacrolimus 2 mg BID (suspension, via G tube).  Goal level 8-12.  Level 11/16 therapeutic at 9.5, no dose adjustment, repeat level 11/19 (ordered).  - MMF 1000 mg BID  (11/2, decreased given GI bleed, AZA to be avoided given TPMT)  - Prednisolone 12.5 mg BID, next taper due 11/21 (ordered)  Date AM dose (mg) PM dose (mg)   11/7/21 12.5 12.5   11/21/21 12.5 10   12/5/21 10 10   1/2/22 10 7.5   1/30/22 7.5 7.5   2/27/22 7.5 5   3/27/22 5 5   4/24/22 5 2.5      Prophylaxis:   - Bactrim for PJP ppx (held 11/2-11/5 d/t BRENNAN)  - Nystatin for oral candidiasis ppx, 6 month course  - See below for serologies and viral ppx:    Donor Recipient Intervention   CMV status Negative Negative None, CMV monthly (ordered 11/16)   EBV status Positive Positive None, EBV monthly (ordered 11/16)   HSV status N/A Positive S/p acyclovir POD #1-30 (recent infection history pre-txp)      ID: Concern for possible Strongyloides exposure pre-transplant s/p ivermectin x1 dose (9/17).  Donor and recipient cultures NGTD.  S/p IV ceftazidime/vancomycin for 48h per protocol and additional empiric ceftazidime 10/19-10/23 given recurrent fevers as below.  Cryptococcal Ag negative 10/23.  - Monthly Coccidioides Ag x12 months post-transplant per ID (urine and serum negative 10/17), due 11/17 (ordered)     Klebsiella pneumoniae:  Pseudomonas fluorescens/putida: Klebsiella initially noted trach sputum culture 11/10 (R-ampicillin, ciprofloxacin; I-ampicillin/sulbactam, levofloxacin).  Started on ceftriaxone 11/11, transitioned to ertapenem 11/12-11/13, and back to ceftriaxone 11/14.  Bronch culture 11/12 with Klebsiella and Pseudomonas fluorescens/putida (R-meropenem).     - ABX: Zosyn (11/15) for 2 week course     Recurring fevers, Resolved:  Fevers post-op, Tmax 101.7 POD #1.  Febrile with worsening leukocytosis again 10/25, generally persisting.  LP (10/29), xanthochromic with pleocytosis thought to be appropriate given RBC and WBCs, no ABX recommended per transplant ID and neurology.  S/p empiric meropenem 10/28-11/2.  Now afebrile, ABX as above.     Disseminated Candida: Noted on blood cultures 10/20 and 10/22.  BDG  fungitell positive (399) on 10/20.  Respiratory cultures with persistent Candida albicans.  TC 10/23 without evidence of endocarditis.  Ophthalmology consult 10/24 with benign dilated fundoscopic exam.  Candida empyema also noted 10/25, chest tubes inadvertently removed by CVTS 10/28, left chest tube replaced by IR 11/3 as above with ongoing Candida on cultures.    - Fluconazole (10/26 per transplant ID, prior micafungin 10/22-10/27), repeat EKG for QTc monitoring 11/23 (ordered), LFTs as below (see transplant ID note 11/5, will need repeat imaging to ensure left pleural effusion almost entirely resolved before removing chest tube and concluding fluconazole, reevaluate around 11/25)      HSV: Chronic intermittent active infection pre-transplant with recent HSV infection: crusted lesions throughout left side of jaw, s/p 10 day treatment course of ACV through 10/9.  HSV PCR blood negative 10/17.  S/p ACV ppx as above (started POD #1 instead of POD #8 given HSV history and location).     Hypogammaglobulinemia: IgG previously low at 364 (9/7).  Noted at 265 at time of transplant, s/p IVIG with premedication 10/21.    - Repeat IgG 11/17 (ordered)     Positive cross match: Note that he received two doses of rituximab in June, which is likely contributing to cross match result.  DSA most recently negative 11/1.  - DSA monitoring q2 weeks, (11/15) pending     PHS risk criteria donor:  Additional labs required post-transplant (between 4-8 weeks post-op): Hepatitis B, Hepatitis C, and HIV by VISHAL (11/15, pending).     SALTY: Noted pre-transplant.  Home CPAP 6-12 cm H2O.  - Resume BiPAP after decannulation.     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   MRI 10/23 with multifocal subacute infarcts within both cerebral hemispheres and left cerebellum.  DDx include surgery v embolic v infectious.  Heparin drip started 10/23 (intermittently held with GI bleed as below).  Repeat stroke code 10/25 d/t marked decrease in responsiveness with sedation wean, pupil inequality, and absent gag reflex.  CTA head without obvious new pathology, MRI brain (with and without contrast) primarily revealing for infarct, low likelihood of PRES.  Ammonia normal.  VEEG per neuro with severe diffuse encephalopathy.  Gradual improvement noted since 10/29, repeat head CT 11/2 (following code) without new acute intracranial abnormalities.  - AC management per primary team as below  - PT/OT     Afib with RVR: Noted 10/18, started on amiodarone drip and transitioned to PO 10/21, dose decreased 10/29 (anticipate 4 week course).  Currently in SR.  Heparin drip as above.  Metoprolol resumed 11/3.     Right subclavian DVT: Seen on UE US from 11/4.  Heparin drip resumed 11/5.        Recurrent GI bleed, Resolved: Hemoglobin dropped to 6.6 10/22, s/p 2 units pRBC.  OG tube with bloody output, EGD 10/23 noted NJ/OG tube trauma with scant oozing.  Progressive hypotension 11/2, hemoglobin 5.8 with bloody G tube output.  Heparin drip held, transitioned to PPI drip, and MTP activated.  EGD 11/2 with large amount of clotted blood in stomach and area of raised mucosa with small adherent clot near PEG tube site that was clipped, no active bleeding.  Abdominal CT 11/2 with stomach distended with blood products and dilated small bowel.  Maroon stools 11/3, repeat EGD with extensive old blood in stomach, no active bleeding, small nodular area with prior clips clipped again.  Most recent EGD 11/5 with ulcer noted at PEG tube bumper site, gastritis, and suction marks from G tube, and GJ arm is in the duodenal bulb.  Large amount of TF noted in G tube drainage 11/7.  AXR 11/9 with GJ tube tip projecting over  "proximal duodenum.  GI exchanged GJ tube 11/9.     BRENNAN, Resolved:   Hyperkalemia, Resolved: Baseline creatinine 0.7.  BRENNAN noted POD #1, UO also downtrending.  Improved with aggressive diuresis, Cr worsened again (1.38 on 11/2) along with up trending BUN and hyperkalemia.  Nephrology consulted 11/1, thought to be prerenal/component of ATN.  Now resolved.     Elevated LFTs: Shock liver post-op.  Initial ALT//230 evening of surgery, then with marked increase to 1550/1246 POD #1.  Intermittently elevated since and recently with alk phos elevation.    We appreciate the excellent care provided by the CVICU and CVTS teams.  Recommendations communicated via in person rounding and this note.  Will continue to follow along closely, please do not hesitate to call with any questions or concerns.    Patient seen and discussed with Dr. Wells.    Keyla Rice PA-C  [facility]  Pulmonary CF/Transplant     Subjective & Interval History:     Back on CMV settings during the day and overnight.  Started on propofol, fentanyl, and Seroquel yesterday.  Appeared more comfortable during visit this morning, opens eyes to voice and following commands although quickly back to sleep.  On CMV 12/450/8/50.  Received chest physiotherapy TID.  Left chest tube remains, small amount of output.  Net negative with diuresis yesterday.  Afebrile, increase in leukocytosis.  Denies pain.    Review of Systems:     ROS as above otherwise limited due to sedation/communication barriers    Physical Exam:     Vital signs:  Temp: 99  F (37.2  C) Temp src: Axillary BP: 115/62 Pulse: 102   Resp: 22 SpO2: 100 % O2 Device: Mechanical Ventilator Oxygen Delivery: 45 LPM Height: 162.6 cm (5' 4\") Weight: 63.5 kg (139 lb 15.9 oz)  I/O:     Intake/Output Summary (Last 24 hours) at 11/16/2021 1027  Last data filed at 11/16/2021 1000  Gross per 24 hour   Intake 3210.37 ml   Output 3615 ml   Net -404.63 ml     Constitutional: Lying in bed, in no apparent " distress.   HEENT: Eyes with pink conjunctivae, anicteric.  Oral mucosa moist without lesions.  Trach cdi.  PULM: Fair air flow bilaterally.  Few scattered coarse crackles.  No rhonchi, no wheezes.  Non-labored breathing on full vent settings.  CV: Normal S1 and S2.  RRR.  No murmur, gallop, or rub.  No peripheral edema.   ABD: NABS, soft, nondistended.    MSK: Able to squeeze left > right hand and move BLE.    NEURO: Opens eyes to voice, following commands.  SKIN: Warm, dry.  No rash on limited exam.   PSYCH: Calm.     Lines, Drains, and Devices:  Peripheral IV 11/02/21 Anterior;Right Upper forearm (Active)   Site Assessment WDL 11/16/21 0400   Line Status Infusing;Checked every 1-2 hour 11/16/21 0400   Dressing Intervention Dressing reinforced 11/08/21 2000   Phlebitis Scale 0-->no symptoms 11/16/21 0400   Infiltration Scale 0 11/16/21 0400   Infiltration Site Treatment Method  None 11/10/21 0400   Number of days: 14       PICC Triple Lumen 11/04/21 Left Basilic Access. PICC okay to use. (Active)   Site Assessment WDL 11/16/21 0400   External Cath Length (cm) 1 cm 11/04/21 1747   Extremity Circumference (cm) 28 cm 11/04/21 1747   Dressing Intervention Transparent;Chlorhexidine patch 11/16/21 0400   Dressing Change Due 11/21/21 11/16/21 0000   Mosley - Status occluded 11/16/21 0400   Gray - Cap Change Due 11/19/21 11/16/21 0000   Red - Status infusing 11/16/21 0400   Red - Cap Change Due 11/19/21 11/16/21 0000   White - Status infusing 11/16/21 0400   White - Cap Change Due 11/19/21 11/16/21 0000   Extravasation? No 11/16/21 0400   Line Necessity Yes, meets criteria 11/16/21 0400   Number of days: 12     Data:     LABS    CMP:   Recent Labs   Lab 11/16/21  0855 11/16/21  0357 11/16/21  0356 11/15/21  2356 11/15/21  1951 11/15/21  1610 11/15/21  0749 11/15/21  0344 11/14/21  1940 11/14/21  1536 11/14/21  0307 11/14/21  0258 11/13/21  0811 11/13/21 0340   NA  --  139  139  --   --   --  139  --  140  140  --  140   --  140  140   < > 140  140   POTASSIUM  --  4.0  4.0  --   --   --  4.4  --  4.6  4.6  --  4.9  --  4.4  4.4   < > 4.1  4.1   CHLORIDE  --  102  102  --   --   --  105  --  106  106  --  107  --  105  105   < > 106  106   CO2  --  28  28  --   --   --  26  --  31  31  --  29  --  30  30   < > 31  31   ANIONGAP  --  9  9  --   --   --  8  --  3  3  --  4  --  5  5   < > 3  3   * 216*  216* 197* 200*   < > 205*   < > 191*  191*   < > 179*  162*   < > 172*  172*   < > 190*  190*   BUN  --  44*  44*  --   --   --  42*  --  37*  37*  --  40*  --  47*  47*   < > 44*  44*   CR  --  0.62*  0.62*  --   --   --  0.58*  --  0.47*  0.47*  --  0.54*  --  0.63*  0.63*   < > 0.61*  0.61*   GFRESTIMATED  --  >90  >90  --   --   --  >90  --  >90  >90  --  >90  --  >90  >90   < > >90  >90   ERIK  --  9.1  9.1  --   --   --  8.9  --  8.7  8.7  --  8.7  --  8.8  8.8   < > 8.6  8.6   MAG  --  1.7  --   --   --   --   --  1.5*  --   --   --  1.5*  --  1.6   PHOS  --  5.4*  --   --   --   --   --  3.6  --   --   --  4.2  --  4.3   PROTTOTAL  --  5.4*  --   --   --   --   --  5.4*  --   --   --  5.6*  --  5.0*   ALBUMIN  --  2.1*  --   --   --   --   --  2.1*  --   --   --  2.1*  --  2.0*   BILITOTAL  --  0.2  --   --   --   --   --  0.2  --   --   --  0.2  --  0.3   ALKPHOS  --  177*  --   --   --   --   --  150  --   --   --  160*  --  144   AST  --  17  --   --   --   --   --  16  --   --   --  18  --  16   ALT  --  35  --   --   --   --   --  33  --   --   --  33  --  33    < > = values in this interval not displayed.     CBC:   Recent Labs   Lab 11/16/21  0357 11/15/21  0344 11/14/21  0258 11/13/21 0340   WBC 14.3* 11.2* 16.9* 16.4*   RBC 3.08* 3.14* 3.25* 2.97*   HGB 9.5* 9.7* 9.9* 9.0*   HCT 30.5* 30.8* 31.6* 29.0*   MCV 99 98 97 98   MCH 30.8 30.9 30.5 30.3   MCHC 31.1* 31.5 31.3* 31.0*   RDW 17.2* 17.2* 17.4* 17.4*   * 160 176 169       INR:   Recent Labs   Lab 11/16/21 0357  11/15/21  0344 11/14/21  0258 11/13/21  0340   INR 1.18* 1.15 1.17* 1.10       Glucose:   Recent Labs   Lab 11/16/21  0855 11/16/21  0357 11/16/21  0356 11/15/21  2356 11/15/21  1951 11/15/21  1610   * 216*  216* 197* 200* 173* 205*       Blood Gas:   Recent Labs   Lab 11/15/21  1012 11/14/21  0534 11/11/21  1740 11/11/21  1516   PHV 7.40 7.43  --  7.43   PCO2V 46 47  --  40   PO2V 40 38  --  34   HCO3V 29* 31*  --  26   LORI 3.3* 5.8*  --  1.8   O2PER 50 30 40 40       Culture Data No results for input(s): CULT in the last 168 hours.    Virology Data:   Lab Results   Component Value Date    FLUAH1 Negative 11/12/2021    FLUAH3 Negative 11/12/2021    HA0897 Negative 11/12/2021    IFLUB Negative 11/12/2021    RSVA Negative 11/12/2021    RSVB Negative 11/12/2021    PIV1 Negative 11/12/2021    PIV2 Negative 11/12/2021    PIV3 Negative 11/12/2021    HMPV Negative 11/12/2021    HRVS Negative 11/12/2021    ADVBE Negative 11/12/2021    ADVC Negative 11/12/2021    ADVC Negative 10/17/2021       Historical CMV results (last 3 of prior testing):  Lab Results   Component Value Date    CMVQNT Not Detected 11/12/2021    CMVQNT Not Detected 10/26/2021     No results found for: CMVLOG    Urine Studies    Recent Labs   Lab Test 11/01/21  1336 10/25/21  1507   URINEPH 5.5 5.5   NITRITE Negative Negative   LEUKEST Negative Negative   WBCU 0 2       Most Recent [facility] Pulmonary Function Testing (FVC/FEV1 only)  No results found for: 20002  No results found for: 20003  No results found for: 20015  No results found for: 20016    IMAGING    Recent Results (from the past 48 hour(s))   XR Chest Port 1 View    Narrative    Exam: XR CHEST PORT 1 VIEW, 11/15/2021 1:25 AM    Comparison: Chest x-ray 11/14/2021    History: s/p lung transplant    Findings:  A single portable AP semiupright view of the chest is obtained.  Tracheostomy tube tip is in the mid thoracic trachea. Left upper  extremity PICC tip is in the midsuperior vena cava.  Left-sided chest  tube in place. Postoperative changes of bilateral lung  transplantation, clamshell sternotomy wires are intact.    Trachea is midline. Cardiomediastinum is unchanged.  Cardiopulmonary  silhouette is within normal limits. Similar appearance of basilar  predominant pulmonary opacities There is no pneumothorax or pleural  effusion.       Impression    Impression: Stable appearance of basilar predominant pulmonary  opacities.    I have personally reviewed the examination and initial interpretation  and I agree with the findings.    JOSEE ROMERO MD         SYSTEM ID:  [id number]   XR Chest Port 1 View   Result Value    Radiologist flags questionable left apical pneumothorax, retraction    Narrative    Exam: XR CHEST PORT 1 VIEW, 11/15/2021 10:31 AM    Indication: sob    Comparison: 11/15/2021 and 11/14/2021 chest    Findings:     Portable, semiupright view of the chest. Tracheostomy cannula is  stable in position. Stable post surgical changes of bilateral lung  transplant. Central thoracotomy wires are intact. Left PICC tip has  been retracted and now projects over the innominate vein. Left basilar  chest tube is stable in position.    Midline trachea. Stable cardiac silhouette. Streaky bibasilar  opacities and small bilateral pleural effusions are stable. No focal  airspace consolidation. Questionable, small left apical pneumothorax.  No right sided pneumothorax.      Impression    Impression:   1. Left PICC tip is retracted and now projects over the innominate  vein. Remaining support devices are stable in position.  2. Small bilateral pleural effusions with overlying airspace  opacities, most suggestive of atelectasis.  3. Questionable left apical pneumothorax.    [Consider Follow Up: questionable left apical pneumothorax, retraction  of left PICC]    This report will be copied to the [facility] to ensure a  provider acknowledges the finding. Access Center is available Monday  through  Friday 8am-3:30 pm.      I have personally reviewed the examination and initial interpretation  and I agree with the findings.    JODI MENDEZ MD         SYSTEM ID:  [id number]   XR Chest Port 1 View    Narrative    Exam: XR CHEST PORT 1 VIEW, 11/16/2021 12:57 AM    Comparison: Chest x-ray 11/15/2021    History: s/p lung transplant    Findings:  A single portable AP semiupright view of the chest is obtained.  Tracheostomy tube tip is in the midthoracic trachea. Postoperative  changes of bilateral lung transplantation, clamshell sternotomy wires  are intact. Left sided chest tube in place. Left upper extremity PICC  tip terminates near the superior cavoatrial junction.    Trachea is midline. Mediastinum is within normal limits.  Cardiopulmonary silhouette is within normal limits. Slight increase in  right basilar consolidation. Trace bilateral pleural effusions. No  appreciable pneumothorax. The upper abdomen is unremarkable. Osseous  structures are unchanged.      Impression    Impression:   1. Slight increase in right basilar opacity which may represent  infection or atelectasis.  2. Trace bilateral pleural effusions with associated atelectasis.  3. No appreciable pneumothorax.    I have personally reviewed the examination and initial interpretation  and I agree with the findings.    ROSS OLMSTEAD MD         SYSTEM ID:  [id number]

## 2021-11-16 NOTE — PHARMACY-ANTICOAGULATION SERVICE
Clinical Pharmacy - Warfarin Dosing Consult     Pharmacy has been consulted to manage this patient s warfarin therapy.  Indication: Atrial Fibrillation;Other - specify in comments (RIJ Thrombosis)  Therapy Goal: INR 2-3  OP Anticoag Clinic: N/A  Warfarin Prior to Admission: No  Significant drug interactions: Bactrim, amiodarone will increase INR and risk of bleed. Fluconazole may also increase INR.  Recent documented change in oral intake/nutrition: No  Dose Comments: Will start dosing conservatively at 2 mg due to multiple drug interactions    INR   Date Value Ref Range Status   11/16/2021 1.18 (H) 0.85 - 1.15 Final   11/15/2021 1.15 0.85 - 1.15 Final       Recommend warfarin 2 mg today.  Pharmacy will monitor Edson Thornton daily and order warfarin doses to achieve specified goal.      Please contact pharmacy as soon as possible if the warfarin needs to be held for a procedure or if the warfarin goals change.

## 2021-11-16 NOTE — PLAN OF CARE
Major Shift Events: Pt opens eyes spontaneously, follows commands and moves all extremities. Sedated with propofol and fentanyl. NSR on telemetry with stable BP. Bivona 6 trach on CMV settings all night and tolerating well at 50% FiO2. Suctioned q2-3hours. Adequate UOP via metzger with lasix dose, loose/watery BM per rectal tube. TF running at goal of 45ml/hr. G-tube to gravity. CT in place. Heparin gtt running.     Plan: Monitor and control anxiety and pain. Work with therapy. Family care conference today at 1400.    For vital signs and complete assessments, please see documentation flowsheets.

## 2021-11-16 NOTE — PLAN OF CARE
Major Shift Events:  Pt continues to have anxiety, agitation, SOB, increased respiratory rate and work of breathing intermittently and when sedation lightened. PRN Seroquel TID initiated, propofol and fentanyl gtts continued. Pt more tachycardic this afternoon, MD aware, no changes in plan of care at this time.   Labs/Protocols: K replaced x1 per protocol. Hgb stable.   Neuro: Sedated with intermittent anxiety and agitation. RASS -2 to +1 this shift. PERRLA. Follows commands. Moves all extremities, though significant weakness. T-Max 99.1, most recent temp: 97.7. Pt denied pain throughout shift.   Cardiac: SR/ST, HR 80s-110s. MAP goal >65, SBP goal <140. Palpable pulses throughout.   Respiratory: Bivonna 6 trach with vent. CMV 50%, 460, 12, 8. Pressure supported x1 for ~3 hours this shift 10/8. Lung  sounds coarse/diminished. Minimal thin secretions via trach. Pigtail chest tube to -20 suction with minimal serosanguinous output.   GI/: Watson in place due to retention, adequate urine output. IV lasix Q 8 hours. PEG with G-to gravity drainage, minimal bilious output, J-with TF at goal. Rectal tube in place with watery output.   Skin: Clamshell incision, KIM, healing. Generalized bruising and scattered scabbing. Excoriation to [place] area, barrier cream applied.   LDAs: L triple lumen PICC, alteplase to grey lumen this shift due to occlusion with good results. L PIV.   GTTs: Heparin, Propofol, Fentanyl  Diet: NPO. TF at 45cc/hr with 50cc water Q 4 hours.   Activity: Ax2 with lift. Pt up to chair for ~4 hours this shift. Participated with PT and OT this shift.   Teaching: Patient educated on plan of care and cares throughout shift. Care conference today with pt's family, see care coordinator's note.     Plan: Continue to treat anxiety, agitation and delirium. Wean propofol and fentanyl as tolerated. Continue antibiotics and chest tube for infection treatment. Continue pressure support trials as tolerated, with CMV  support overnight.   For vital signs and complete assessments, please see documentation flowsheets.

## 2021-11-16 NOTE — PROGRESS NOTES
Care conference held.    Attendees:  CVICU team: Dr. Medellin, Dr. Pritchett  Pulmonology team: Dr. Wells; GERA Degroot.; Mallory Beasley, RNCC   RNCC: Janee Moss RN  Family- Zenia (step daughter); Peg- (Significant other); and father.     Expected date of discharge is: TBD- this will be determined when patient is more stable. There may be a possibility that Edson may need to go to a lower level of care (long-term acute care hospital). Family is aware of this recovery phase. Providers were able to update the family who called in to the care conference. Appropriate questions were answered.     Goal(s) met: Trach and PEG placement     Goal(s) to continue: Treat new infection, continue to wean sedation as appropriate.    New Goal(s): Continue with plan of care, recovery and start rehab process    Janee Moss RN, BSN  Float RN Care Coordinator  Pager [phone] (unit RNCC pager)     For Weekend & Holiday on call RN Care Coordinator:  (Home discharge with needs including home care, Assisted living facility returns, Durable medical equip, IV antibiotics)   [place]    Pager [phone]  [place] (Sunday Only) Pager [phone]    For weekend social work needs, contact information below:  (Transitional care unit, Long-term care unit, Hospice, Counseling, Domestic violence,Vulnerable adult, Health Care Directive)  4A, 4C, 4E, 5A, 5B  Pager [phone]  6A, 6B, 6C, 6D   Pager [phone]  7A, 7B, 7C, 7D, 5C  Pager [phone]  [facility] (Saturday Only) Pager [phone]    After hours for all units- (only  is available -[id number]/everyday)  Pager [phone]     stretcher

## 2021-11-16 NOTE — PROGRESS NOTES
.    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 Corral. 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  UGIB causing code blue on 11/2.     OVERNIGHT EVENTS:  -NAEO    TODAY'S PROGRESS:   - Trach Dome  - Increase Lantus to 40    PLAN:  Neuro/ pain/ sedation:  Acute postoperative pain   Anxiety and depression  Acute to subacute CVA, b/l cerebral hemispheres and L cerebellum, suspect embolic  Encephalopathy and diffuse weakness - improving slightly   R ear lateral canal floor excoriation with bleeding - resolved   - Tylenol and oxycodone prn, lido patch   - PTA lexapro  - Seroquel 25 BID    -PRN Seroquel    Pulmonary care:   SALTY on home CPAP  Acute hypoxic respiratory failure s/p b/l lung transplant 10/16/21  S/p tracheostomy 10/29  Empyema with Candida s/p chest tube 11/3/21  - mechanically ventilated via trach, PST, trach dome               - CMV overnight  - VBG after trach dome  - Levalbuterol nebs scheduled and Mucomyst, chest PT  - Daily CXR  - appreciate Pulmonary   -IGG 17 NOV    Cardiovascular:    HTN  Afib   PFO  Vasoplegic shock in setting of hypovolemia - resolved  - Monitor hemodynamic status.   - MAP goal <100, SBP <140  - metoprolol 75 BID, held due to bradycardia    - prn labetalol  - rosuvastatin 10 mg  - Amiodarone 200 mg daily (end date 11/27/21)  - low-intensity heparin gtt     GI care:   Elevated LFTs - recurrent  GI bleed 2/2 NG trauma, now recurrent secondary to GJ bumper ulcer   Moderate malnutrition in  the context of acute on chronic illness  PEG-J, with malpositioned J tube   - Diet: TF   - PPI BID    Fluids/ Electrolytes/ Nutrition:   Hypernatremia  Hyperkalemia, resolved   BL creat appears to be ~ 0.7  - free water flushes  - Strict I/O, daily weights  - Avoid/limit nephrotoxins as able  - Replete lytes PRN per protocol    Endocrine:    Stress induced hyperglycemia with steroid-induced component, on DM2  Hypothyroidism  RA  Preop A1c 6.6  - Discontinue insulin gtt. High sliding scale insulin    - Goal BG <180 for optimal healing  - continue home synthroid  - lantus 30   - Increase Lantus 40    ID/ Antibiotics:  Immunosuppression s/p transplant  Candidal infection of donor lungs (likely source), pleural fluid, and fungemia   PNA  - NGTD CSF. Last + blood cx 10/22. No vegetations on valves per TC 10/23  - Nystatin, Acyclovir, bactrim   - Tacrolimus (via g tube), prednisone    - appreciate transplant ID, ophthalmology    - d/w ID, no indication to treat Staph epi in sputum   - Fluconazole for fungemia (re-evaluate on 11/25)  - Zosyn for klebsiella and Pseudomonas for two weeks for coverage      Heme:     Acute blood loss anemia secondary to surgery and GI bleed, and anemia related to critical illness and iron deficiency    Hypogammaglobulinemia s/p IVIG  Thrombocytopenia, HIT negative - resolved   Lactic acidosis, resolved  Nonocclusive R subclavian thrombus    - heparin low intensity  - Start cumadin      Prophylaxis:    - Mechanical prophylaxis for DVT: SCDs  - Chemical DVT prophylaxis: heparin low intensity gtt    - PPI     Lines/ tubes/ drains:  - trach  - peg-j  - Watson 10/25; replaced 11/14  - PIVs  - chest tube L  - L PICC      Disposition:  - CVICU    Patient seen, findings and plan discussed with CV ICU staff    Aditya Jo  Medical Student    Resident/Fellow Attestation   I, Moises Ribera, was present with the medical student who participated in the service and in the documentation of the note.  I  have verified the history and personally performed the physical exam and medical decision making.  I agree with the assessment and plan of care as documented in the note.      Moises Ribera MD  PGY-3 Anesthesiology        ====================================    SUBJECTIVE:  -NAEO    OBJECTIVE:   1. VITAL SIGNS:   Temp:  [97.6  F (36.4  C)-98.9  F (37.2  C)] 98.9  F (37.2  C)  Pulse:  [48-92] 75  Resp:  [15-34] 16  BP: ()/() 104/67  FiO2 (%):  [40 %-50 %] 50 %  SpO2:  [83 %-100 %] 100 %  Ventilation Mode: CMV/AC  (Continuous Mandatory Ventilation/ Assist Control)  FiO2 (%): 50 %  Rate Set (breaths/minute): 12 breaths/min  Tidal Volume Set (mL): 450 mL  PEEP (cm H2O): 8 cmH2O  Pressure Support (cm H2O): 10 cmH2O  Oxygen Concentration (%): 50 %  Resp: 16      2. INTAKE/ OUTPUT:   I/O last 3 completed shifts:  In: 2757.07 [I.V.:1162.07; NG/GT:515]  Out: 3765 [Urine:2615; Emesis/NG output:120; Stool:950; Chest Tube:80]    3. PHYSICAL EXAMINATION:   General: Alert individual sitting in bed, delerious  Neuro: Alert and Oriented, resting comfortably  Resp: Trach present, tachypnic  CV: RRR  Abdomen: Soft, Non-distended, Non-tender  Incisions: c/d/i  Extremities: warm and well perfused    4. INVESTIGATIONS:   Arterial Blood Gases   Recent Labs   Lab 11/11/21  1740   PH 7.48*   PCO2 37   PO2 106*   HCO3 28     Complete Blood Count   Recent Labs   Lab 11/16/21  0357 11/15/21  0344 11/14/21  0258 11/13/21  0340   WBC 14.3* 11.2* 16.9* 16.4*   HGB 9.5* 9.7* 9.9* 9.0*   * 160 176 169     Basic Metabolic Panel  Recent Labs   Lab 11/16/21  0357 11/16/21  0356 11/15/21  2356 11/15/21  1951 11/15/21  1610 11/15/21  0749 11/15/21  0344 11/14/21 1940 11/14/21  1536     139  --   --   --  139  --  140  140  --  140   POTASSIUM 4.0  4.0  --   --   --  4.4  --  4.6  4.6  --  4.9   CHLORIDE 102  102  --   --   --  105  --  106  106  --  107   CO2 28  28  --   --   --  26  --  31  31  --  29   BUN  44*  44*  --   --   --  42*  --  37*  37*  --  40*   CR 0.62*  0.62*  --   --   --  0.58*  --  0.47*  0.47*  --  0.54*   *  216* 197* 200* 173* 205*   < > 191*  191*   < > 179*  162*    < > = values in this interval not displayed.     Liver Function Tests  Recent Labs   Lab 11/16/21  0357 11/15/21  0344 11/14/21  0258 11/13/21  0340   AST 17 16 18 16   ALT 35 33 33 33   ALKPHOS 177* 150 160* 144   BILITOTAL 0.2 0.2 0.2 0.3   ALBUMIN 2.1* 2.1* 2.1* 2.0*   INR 1.18* 1.15 1.17* 1.10     Pancreatic Enzymes  No lab results found in last 7 days.  Coagulation Profile  Recent Labs   Lab 11/16/21  0357 11/15/21  0344 11/14/21 0258 11/13/21  0340   INR 1.18* 1.15 1.17* 1.10         5. RADIOLOGY:   Recent Results (from the past 24 hour(s))   XR Chest Port 1 View   Result Value    Radiologist flags questionable left apical pneumothorax, retraction    Narrative    Exam: XR CHEST PORT 1 VIEW, 11/15/2021 10:31 AM    Indication: sob    Comparison: 11/15/2021 and 11/14/2021 chest    Findings:     Portable, semiupright view of the chest. Tracheostomy cannula is  stable in position. Stable post surgical changes of bilateral lung  transplant. Central thoracotomy wires are intact. Left PICC tip has  been retracted and now projects over the innominate vein. Left basilar  chest tube is stable in position.    Midline trachea. Stable cardiac silhouette. Streaky bibasilar  opacities and small bilateral pleural effusions are stable. No focal  airspace consolidation. Questionable, small left apical pneumothorax.  No right sided pneumothorax.      Impression    Impression:   1. Left PICC tip is retracted and now projects over the innominate  vein. Remaining support devices are stable in position.  2. Small bilateral pleural effusions with overlying airspace  opacities, most suggestive of atelectasis.  3. Questionable left apical pneumothorax.    [Consider Follow Up: questionable left apical pneumothorax, retraction  of left  [facility]]    This report will be copied to the [facility] to ensure a  provider acknowledges the finding. [facility] is available Monday  through Friday 8am-3:30 pm.      I have personally reviewed the examination and initial interpretation  and I agree with the findings.    JODI MENDEZ MD         SYSTEM ID:  [id number]       =========================================

## 2021-11-17 ENCOUNTER — APPOINTMENT (OUTPATIENT)
Dept: GENERAL RADIOLOGY | Facility: CLINIC | Age: 56
End: 2021-11-17
Attending: STUDENT IN AN ORGANIZED HEALTH CARE EDUCATION/TRAINING PROGRAM
Payer: COMMERCIAL

## 2021-11-17 ENCOUNTER — APPOINTMENT (OUTPATIENT)
Dept: OCCUPATIONAL THERAPY | Facility: CLINIC | Age: 56
End: 2021-11-17
Attending: STUDENT IN AN ORGANIZED HEALTH CARE EDUCATION/TRAINING PROGRAM
Payer: COMMERCIAL

## 2021-11-17 ENCOUNTER — APPOINTMENT (OUTPATIENT)
Dept: PHYSICAL THERAPY | Facility: CLINIC | Age: 56
End: 2021-11-17
Attending: STUDENT IN AN ORGANIZED HEALTH CARE EDUCATION/TRAINING PROGRAM
Payer: COMMERCIAL

## 2021-11-17 LAB
ALBUMIN SERPL-MCNC: 2.1 G/DL (ref 3.4–5)
ALP SERPL-CCNC: 150 U/L (ref 40–150)
ALT SERPL W P-5'-P-CCNC: 30 U/L (ref 0–70)
ANION GAP SERPL CALCULATED.3IONS-SCNC: 14 MMOL/L (ref 3–14)
ANION GAP SERPL CALCULATED.3IONS-SCNC: 7 MMOL/L (ref 3–14)
AST SERPL W P-5'-P-CCNC: 15 U/L (ref 0–45)
ATRIAL RATE - MUSE: 108 BPM
BACTERIA SPEC CULT: ABNORMAL
BILIRUB SERPL-MCNC: 0.2 MG/DL (ref 0.2–1.3)
BUN SERPL-MCNC: 40 MG/DL (ref 7–30)
BUN SERPL-MCNC: 45 MG/DL (ref 7–30)
CALCIUM SERPL-MCNC: 7.8 MG/DL (ref 8.5–10.1)
CALCIUM SERPL-MCNC: 8.7 MG/DL (ref 8.5–10.1)
CHLORIDE BLD-SCNC: 103 MMOL/L (ref 94–109)
CHLORIDE BLD-SCNC: 107 MMOL/L (ref 94–109)
CO2 SERPL-SCNC: 27 MMOL/L (ref 20–32)
CO2 SERPL-SCNC: 30 MMOL/L (ref 20–32)
CREAT SERPL-MCNC: 0.68 MG/DL (ref 0.66–1.25)
CREAT SERPL-MCNC: 0.77 MG/DL (ref 0.66–1.25)
DIASTOLIC BLOOD PRESSURE - MUSE: NORMAL MMHG
ERYTHROCYTE [DISTWIDTH] IN BLOOD BY AUTOMATED COUNT: 17.1 % (ref 10–15)
GFR SERPL CREATININE-BSD FRML MDRD: >90 ML/MIN/1.73M2
GFR SERPL CREATININE-BSD FRML MDRD: >90 ML/MIN/1.73M2
GLUCOSE BLD-MCNC: 200 MG/DL (ref 70–99)
GLUCOSE BLD-MCNC: 210 MG/DL (ref 70–99)
GLUCOSE BLDC GLUCOMTR-MCNC: 155 MG/DL (ref 70–99)
GLUCOSE BLDC GLUCOMTR-MCNC: 159 MG/DL (ref 70–99)
GLUCOSE BLDC GLUCOMTR-MCNC: 172 MG/DL (ref 70–99)
GLUCOSE BLDC GLUCOMTR-MCNC: 174 MG/DL (ref 70–99)
GLUCOSE BLDC GLUCOMTR-MCNC: 179 MG/DL (ref 70–99)
GLUCOSE BLDC GLUCOMTR-MCNC: 182 MG/DL (ref 70–99)
GLUCOSE BLDC GLUCOMTR-MCNC: 191 MG/DL (ref 70–99)
HBV DNA SERPL QL NAA+PROBE: NORMAL
HCT VFR BLD AUTO: 27.6 % (ref 40–53)
HCV RNA SERPL QL NAA+PROBE: NORMAL
HGB BLD-MCNC: 8.6 G/DL (ref 13.3–17.7)
HIV1+2 RNA SERPL QL NAA+PROBE: NORMAL
IGG SERPL-MCNC: 198 MG/DL (ref 610–1616)
INR PPP: 1.23 (ref 0.85–1.15)
INTERPRETATION ECG - MUSE: NORMAL
MAGNESIUM SERPL-MCNC: 1.8 MG/DL (ref 1.6–2.3)
MAGNESIUM SERPL-MCNC: 1.9 MG/DL (ref 1.6–2.3)
MCH RBC QN AUTO: 30.8 PG (ref 26.5–33)
MCHC RBC AUTO-ENTMCNC: 31.2 G/DL (ref 31.5–36.5)
MCV RBC AUTO: 99 FL (ref 78–100)
P AXIS - MUSE: 68 DEGREES
PHOSPHATE SERPL-MCNC: 4.2 MG/DL (ref 2.5–4.5)
PLATELET # BLD AUTO: 132 10E3/UL (ref 150–450)
POTASSIUM BLD-SCNC: 3.4 MMOL/L (ref 3.4–5.3)
POTASSIUM BLD-SCNC: 4.2 MMOL/L (ref 3.4–5.3)
PR INTERVAL - MUSE: 152 MS
PROT SERPL-MCNC: 5.3 G/DL (ref 6.8–8.8)
QRS DURATION - MUSE: 82 MS
QT - MUSE: 368 MS
QTC - MUSE: 493 MS
R AXIS - MUSE: 61 DEGREES
RBC # BLD AUTO: 2.79 10E6/UL (ref 4.4–5.9)
SODIUM SERPL-SCNC: 140 MMOL/L (ref 133–144)
SODIUM SERPL-SCNC: 148 MMOL/L (ref 133–144)
SYSTOLIC BLOOD PRESSURE - MUSE: NORMAL MMHG
T AXIS - MUSE: 243 DEGREES
UFH PPP CHRO-ACNC: 0.5 IU/ML
VENTRICULAR RATE- MUSE: 108 BPM
WBC # BLD AUTO: 13.7 10E3/UL (ref 4–11)

## 2021-11-17 PROCEDURE — 250N000012 HC RX MED GY IP 250 OP 636 PS 637: Performed by: PHYSICIAN ASSISTANT

## 2021-11-17 PROCEDURE — 250N000013 HC RX MED GY IP 250 OP 250 PS 637: Performed by: ANESTHESIOLOGY

## 2021-11-17 PROCEDURE — 258N000003 HC RX IP 258 OP 636: Performed by: THORACIC SURGERY (CARDIOTHORACIC VASCULAR SURGERY)

## 2021-11-17 PROCEDURE — 82310 ASSAY OF CALCIUM: CPT

## 2021-11-17 PROCEDURE — 71045 X-RAY EXAM CHEST 1 VIEW: CPT | Mod: 26 | Performed by: RADIOLOGY

## 2021-11-17 PROCEDURE — 82784 ASSAY IGA/IGD/IGG/IGM EACH: CPT | Performed by: STUDENT IN AN ORGANIZED HEALTH CARE EDUCATION/TRAINING PROGRAM

## 2021-11-17 PROCEDURE — 250N000011 HC RX IP 250 OP 636: Performed by: THORACIC SURGERY (CARDIOTHORACIC VASCULAR SURGERY)

## 2021-11-17 PROCEDURE — 250N000013 HC RX MED GY IP 250 OP 250 PS 637

## 2021-11-17 PROCEDURE — 250N000009 HC RX 250: Performed by: STUDENT IN AN ORGANIZED HEALTH CARE EDUCATION/TRAINING PROGRAM

## 2021-11-17 PROCEDURE — 250N000011 HC RX IP 250 OP 636: Performed by: STUDENT IN AN ORGANIZED HEALTH CARE EDUCATION/TRAINING PROGRAM

## 2021-11-17 PROCEDURE — 250N000013 HC RX MED GY IP 250 OP 250 PS 637: Performed by: STUDENT IN AN ORGANIZED HEALTH CARE EDUCATION/TRAINING PROGRAM

## 2021-11-17 PROCEDURE — 97530 THERAPEUTIC ACTIVITIES: CPT | Mod: GO | Performed by: OCCUPATIONAL THERAPIST

## 2021-11-17 PROCEDURE — 85520 HEPARIN ASSAY: CPT | Performed by: THORACIC SURGERY (CARDIOTHORACIC VASCULAR SURGERY)

## 2021-11-17 PROCEDURE — 87449 NOS EACH ORGANISM AG IA: CPT | Performed by: STUDENT IN AN ORGANIZED HEALTH CARE EDUCATION/TRAINING PROGRAM

## 2021-11-17 PROCEDURE — 94003 VENT MGMT INPAT SUBQ DAY: CPT

## 2021-11-17 PROCEDURE — 71045 X-RAY EXAM CHEST 1 VIEW: CPT

## 2021-11-17 PROCEDURE — 83735 ASSAY OF MAGNESIUM: CPT | Performed by: INTERNAL MEDICINE

## 2021-11-17 PROCEDURE — 99223 1ST HOSP IP/OBS HIGH 75: CPT | Mod: 24 | Performed by: NURSE PRACTITIONER

## 2021-11-17 PROCEDURE — 97110 THERAPEUTIC EXERCISES: CPT | Mod: GP

## 2021-11-17 PROCEDURE — 84100 ASSAY OF PHOSPHORUS: CPT | Performed by: THORACIC SURGERY (CARDIOTHORACIC VASCULAR SURGERY)

## 2021-11-17 PROCEDURE — 99233 SBSQ HOSP IP/OBS HIGH 50: CPT | Performed by: INTERNAL MEDICINE

## 2021-11-17 PROCEDURE — 97535 SELF CARE MNGMENT TRAINING: CPT | Mod: GO | Performed by: OCCUPATIONAL THERAPIST

## 2021-11-17 PROCEDURE — 250N000011 HC RX IP 250 OP 636: Performed by: INTERNAL MEDICINE

## 2021-11-17 PROCEDURE — 94668 MNPJ CHEST WALL SBSQ: CPT

## 2021-11-17 PROCEDURE — 250N000011 HC RX IP 250 OP 636

## 2021-11-17 PROCEDURE — 94640 AIRWAY INHALATION TREATMENT: CPT | Mod: 76

## 2021-11-17 PROCEDURE — 250N000012 HC RX MED GY IP 250 OP 636 PS 637: Performed by: NURSE PRACTITIONER

## 2021-11-17 PROCEDURE — 85027 COMPLETE CBC AUTOMATED: CPT | Performed by: THORACIC SURGERY (CARDIOTHORACIC VASCULAR SURGERY)

## 2021-11-17 PROCEDURE — 82247 BILIRUBIN TOTAL: CPT | Performed by: THORACIC SURGERY (CARDIOTHORACIC VASCULAR SURGERY)

## 2021-11-17 PROCEDURE — 200N000002 HC R&B ICU UMMC

## 2021-11-17 PROCEDURE — 83735 ASSAY OF MAGNESIUM: CPT | Performed by: THORACIC SURGERY (CARDIOTHORACIC VASCULAR SURGERY)

## 2021-11-17 PROCEDURE — 82040 ASSAY OF SERUM ALBUMIN: CPT | Performed by: THORACIC SURGERY (CARDIOTHORACIC VASCULAR SURGERY)

## 2021-11-17 PROCEDURE — 97530 THERAPEUTIC ACTIVITIES: CPT | Mod: GP

## 2021-11-17 PROCEDURE — 250N000013 HC RX MED GY IP 250 OP 250 PS 637: Performed by: NURSE PRACTITIONER

## 2021-11-17 PROCEDURE — 250N000012 HC RX MED GY IP 250 OP 636 PS 637: Performed by: INTERNAL MEDICINE

## 2021-11-17 PROCEDURE — 250N000013 HC RX MED GY IP 250 OP 250 PS 637: Performed by: THORACIC SURGERY (CARDIOTHORACIC VASCULAR SURGERY)

## 2021-11-17 PROCEDURE — 999N000157 HC STATISTIC RCP TIME EA 10 MIN

## 2021-11-17 PROCEDURE — 85610 PROTHROMBIN TIME: CPT | Performed by: THORACIC SURGERY (CARDIOTHORACIC VASCULAR SURGERY)

## 2021-11-17 RX ORDER — DIPHENHYDRAMINE HYDROCHLORIDE 50 MG/ML
50 INJECTION INTRAMUSCULAR; INTRAVENOUS
Status: DISCONTINUED | OUTPATIENT
Start: 2021-11-18 | End: 2021-11-19

## 2021-11-17 RX ORDER — ACETAMINOPHEN 325 MG/1
650 TABLET ORAL ONCE
Status: COMPLETED | OUTPATIENT
Start: 2021-11-18 | End: 2021-11-18

## 2021-11-17 RX ORDER — FUROSEMIDE 10 MG/ML
40 INJECTION INTRAMUSCULAR; INTRAVENOUS 2 TIMES DAILY
Status: DISCONTINUED | OUTPATIENT
Start: 2021-11-17 | End: 2021-11-21

## 2021-11-17 RX ORDER — POTASSIUM CHLORIDE 29.8 MG/ML
20 INJECTION INTRAVENOUS ONCE
Status: COMPLETED | OUTPATIENT
Start: 2021-11-17 | End: 2021-11-17

## 2021-11-17 RX ORDER — MAGNESIUM SULFATE HEPTAHYDRATE 40 MG/ML
2 INJECTION, SOLUTION INTRAVENOUS ONCE
Status: COMPLETED | OUTPATIENT
Start: 2021-11-17 | End: 2021-11-17

## 2021-11-17 RX ORDER — METOPROLOL TARTRATE 25 MG/1
25 TABLET, FILM COATED ORAL 2 TIMES DAILY
Status: DISCONTINUED | OUTPATIENT
Start: 2021-11-17 | End: 2021-11-19

## 2021-11-17 RX ORDER — METHYLPREDNISOLONE SODIUM SUCCINATE 125 MG/2ML
125 INJECTION, POWDER, LYOPHILIZED, FOR SOLUTION INTRAMUSCULAR; INTRAVENOUS
Status: DISCONTINUED | OUTPATIENT
Start: 2021-11-18 | End: 2021-11-19

## 2021-11-17 RX ORDER — DIPHENHYDRAMINE HCL 25 MG
50 CAPSULE ORAL ONCE
Status: COMPLETED | OUTPATIENT
Start: 2021-11-18 | End: 2021-11-18

## 2021-11-17 RX ORDER — WARFARIN SODIUM 2 MG/1
2 TABLET ORAL
Status: COMPLETED | OUTPATIENT
Start: 2021-11-17 | End: 2021-11-17

## 2021-11-17 RX ORDER — ACETAMINOPHEN 325 MG/1
650 TABLET ORAL
Status: DISCONTINUED | OUTPATIENT
Start: 2021-11-18 | End: 2021-11-19

## 2021-11-17 RX ORDER — OXYCODONE HYDROCHLORIDE 5 MG/1
5 TABLET ORAL EVERY 4 HOURS PRN
Status: DISCONTINUED | OUTPATIENT
Start: 2021-11-17 | End: 2021-11-29

## 2021-11-17 RX ORDER — DIPHENHYDRAMINE HYDROCHLORIDE 50 MG/ML
50 INJECTION INTRAMUSCULAR; INTRAVENOUS ONCE
Status: COMPLETED | OUTPATIENT
Start: 2021-11-18 | End: 2021-11-18

## 2021-11-17 RX ORDER — DIPHENHYDRAMINE HCL 25 MG
50 CAPSULE ORAL
Status: DISCONTINUED | OUTPATIENT
Start: 2021-11-18 | End: 2021-11-19

## 2021-11-17 RX ADMIN — QUETIAPINE FUMARATE 25 MG: 25 TABLET ORAL at 07:29

## 2021-11-17 RX ADMIN — ROSUVASTATIN CALCIUM 10 MG: 10 TABLET, FILM COATED ORAL at 07:29

## 2021-11-17 RX ADMIN — INSULIN ASPART 3 UNITS: 100 INJECTION, SOLUTION INTRAVENOUS; SUBCUTANEOUS at 03:40

## 2021-11-17 RX ADMIN — INSULIN ASPART 2 UNITS: 100 INJECTION, SOLUTION INTRAVENOUS; SUBCUTANEOUS at 15:45

## 2021-11-17 RX ADMIN — PREDNISOLONE 12.5 MG: 15 SOLUTION ORAL at 19:55

## 2021-11-17 RX ADMIN — INSULIN ASPART 1 UNITS: 100 INJECTION, SOLUTION INTRAVENOUS; SUBCUTANEOUS at 11:10

## 2021-11-17 RX ADMIN — INSULIN ASPART 1 UNITS: 100 INJECTION, SOLUTION INTRAVENOUS; SUBCUTANEOUS at 20:11

## 2021-11-17 RX ADMIN — SULFAMETHOXAZOLE AND TRIMETHOPRIM 80 MG: 200; 40 SUSPENSION ORAL at 07:29

## 2021-11-17 RX ADMIN — Medication 40 MG: at 19:55

## 2021-11-17 RX ADMIN — ACETYLCYSTEINE 2 ML: 200 SOLUTION ORAL; RESPIRATORY (INHALATION) at 12:27

## 2021-11-17 RX ADMIN — ESCITALOPRAM 5 MG: 5 TABLET, FILM COATED ORAL at 07:28

## 2021-11-17 RX ADMIN — LEVALBUTEROL HYDROCHLORIDE 1.25 MG: 1.25 SOLUTION RESPIRATORY (INHALATION) at 08:00

## 2021-11-17 RX ADMIN — PIPERACILLIN SODIUM AND TAZOBACTAM SODIUM 4.5 G: 4; .5 INJECTION, POWDER, LYOPHILIZED, FOR SOLUTION INTRAVENOUS at 02:38

## 2021-11-17 RX ADMIN — NYSTATIN 1000000 UNITS: 500000 SUSPENSION ORAL at 19:55

## 2021-11-17 RX ADMIN — LEVALBUTEROL HYDROCHLORIDE 1.25 MG: 1.25 SOLUTION RESPIRATORY (INHALATION) at 19:47

## 2021-11-17 RX ADMIN — LIDOCAINE 2 PATCH: 560 PATCH PERCUTANEOUS; TOPICAL; TRANSDERMAL at 07:29

## 2021-11-17 RX ADMIN — Medication 1 PACKET: at 19:55

## 2021-11-17 RX ADMIN — CALCIUM CARBONATE 600 MG (1,500 MG)-VITAMIN D3 400 UNIT TABLET 1 TABLET: at 07:29

## 2021-11-17 RX ADMIN — POTASSIUM CHLORIDE 20 MEQ: 29.8 INJECTION, SOLUTION INTRAVENOUS at 18:11

## 2021-11-17 RX ADMIN — MULTIVIT AND MINERALS-FERROUS GLUCONATE 9 MG IRON/15 ML ORAL LIQUID 15 ML: at 07:28

## 2021-11-17 RX ADMIN — LEVOTHYROXINE SODIUM 25 MCG: 0.03 TABLET ORAL at 07:28

## 2021-11-17 RX ADMIN — AMIODARONE HYDROCHLORIDE 200 MG: 200 TABLET ORAL at 07:29

## 2021-11-17 RX ADMIN — PIPERACILLIN SODIUM AND TAZOBACTAM SODIUM 4.5 G: 4; .5 INJECTION, POWDER, LYOPHILIZED, FOR SOLUTION INTRAVENOUS at 15:38

## 2021-11-17 RX ADMIN — MYCOPHENOLATE MOFETIL 1000 MG: 200 POWDER, FOR SUSPENSION ORAL at 07:32

## 2021-11-17 RX ADMIN — TACROLIMUS 2 MG: 5 CAPSULE ORAL at 17:38

## 2021-11-17 RX ADMIN — MAGNESIUM SULFATE IN WATER 2 G: 40 INJECTION, SOLUTION INTRAVENOUS at 04:56

## 2021-11-17 RX ADMIN — ACETYLCYSTEINE 2 ML: 200 SOLUTION ORAL; RESPIRATORY (INHALATION) at 08:00

## 2021-11-17 RX ADMIN — FUROSEMIDE 40 MG: 10 INJECTION, SOLUTION INTRAVENOUS at 02:38

## 2021-11-17 RX ADMIN — PIPERACILLIN SODIUM AND TAZOBACTAM SODIUM 4.5 G: 4; .5 INJECTION, POWDER, LYOPHILIZED, FOR SOLUTION INTRAVENOUS at 21:31

## 2021-11-17 RX ADMIN — SODIUM CHLORIDE, PRESERVATIVE FREE 5 ML: 5 INJECTION INTRAVENOUS at 17:38

## 2021-11-17 RX ADMIN — NYSTATIN 1000000 UNITS: 500000 SUSPENSION ORAL at 07:28

## 2021-11-17 RX ADMIN — Medication 1 PACKET: at 19:54

## 2021-11-17 RX ADMIN — ACETYLCYSTEINE 2 ML: 200 SOLUTION ORAL; RESPIRATORY (INHALATION) at 19:47

## 2021-11-17 RX ADMIN — WARFARIN SODIUM 2 MG: 2 TABLET ORAL at 17:38

## 2021-11-17 RX ADMIN — PROPOFOL 10 MCG/KG/MIN: 10 INJECTION, EMULSION INTRAVENOUS at 04:01

## 2021-11-17 RX ADMIN — TACROLIMUS 2 MG: 5 CAPSULE ORAL at 07:29

## 2021-11-17 RX ADMIN — ACETYLCYSTEINE 2 ML: 200 SOLUTION ORAL; RESPIRATORY (INHALATION) at 15:13

## 2021-11-17 RX ADMIN — MYCOPHENOLATE MOFETIL 1000 MG: 200 POWDER, FOR SUSPENSION ORAL at 19:55

## 2021-11-17 RX ADMIN — PROPOFOL 15 MCG/KG/MIN: 10 INJECTION, EMULSION INTRAVENOUS at 22:52

## 2021-11-17 RX ADMIN — Medication 10 MG: at 19:55

## 2021-11-17 RX ADMIN — PREDNISOLONE 12.5 MG: 15 SOLUTION ORAL at 07:29

## 2021-11-17 RX ADMIN — LEVALBUTEROL HYDROCHLORIDE 1.25 MG: 1.25 SOLUTION RESPIRATORY (INHALATION) at 12:27

## 2021-11-17 RX ADMIN — Medication 1 PACKET: at 07:30

## 2021-11-17 RX ADMIN — METOPROLOL TARTRATE 25 MG: 25 TABLET, FILM COATED ORAL at 09:24

## 2021-11-17 RX ADMIN — QUETIAPINE FUMARATE 25 MG: 25 TABLET ORAL at 19:55

## 2021-11-17 RX ADMIN — INSULIN ASPART 2 UNITS: 100 INJECTION, SOLUTION INTRAVENOUS; SUBCUTANEOUS at 07:43

## 2021-11-17 RX ADMIN — FUROSEMIDE 40 MG: 10 INJECTION, SOLUTION INTRAVENOUS at 16:12

## 2021-11-17 RX ADMIN — FUROSEMIDE 40 MG: 10 INJECTION, SOLUTION INTRAVENOUS at 09:24

## 2021-11-17 RX ADMIN — PIPERACILLIN SODIUM AND TAZOBACTAM SODIUM 4.5 G: 4; .5 INJECTION, POWDER, LYOPHILIZED, FOR SOLUTION INTRAVENOUS at 09:24

## 2021-11-17 RX ADMIN — POTASSIUM CHLORIDE 40 MEQ: 40 SOLUTION ORAL at 07:28

## 2021-11-17 RX ADMIN — INSULIN ASPART 2 UNITS: 100 INJECTION, SOLUTION INTRAVENOUS; SUBCUTANEOUS at 23:49

## 2021-11-17 RX ADMIN — METOPROLOL TARTRATE 25 MG: 25 TABLET, FILM COATED ORAL at 19:55

## 2021-11-17 RX ADMIN — CALCIUM CARBONATE 600 MG (1,500 MG)-VITAMIN D3 400 UNIT TABLET 1 TABLET: at 17:38

## 2021-11-17 RX ADMIN — MAGNESIUM SULFATE IN WATER 2 G: 40 INJECTION, SOLUTION INTRAVENOUS at 19:34

## 2021-11-17 RX ADMIN — NYSTATIN 1000000 UNITS: 500000 SUSPENSION ORAL at 11:10

## 2021-11-17 RX ADMIN — Medication 40 MG: at 07:29

## 2021-11-17 RX ADMIN — HYDROXYZINE HYDROCHLORIDE 50 MG: 50 TABLET, FILM COATED ORAL at 04:56

## 2021-11-17 RX ADMIN — HEPARIN SODIUM 1100 UNITS/HR: 1000 INJECTION INTRAVENOUS; SUBCUTANEOUS at 03:53

## 2021-11-17 RX ADMIN — Medication 1 PACKET: at 13:58

## 2021-11-17 RX ADMIN — NYSTATIN 1000000 UNITS: 500000 SUSPENSION ORAL at 15:38

## 2021-11-17 RX ADMIN — FLUCONAZOLE IN SODIUM CHLORIDE 400 MG: 2 INJECTION, SOLUTION INTRAVENOUS at 12:11

## 2021-11-17 RX ADMIN — LEVALBUTEROL HYDROCHLORIDE 1.25 MG: 1.25 SOLUTION RESPIRATORY (INHALATION) at 15:13

## 2021-11-17 ASSESSMENT — ACTIVITIES OF DAILY LIVING (ADL)
ADLS_ACUITY_SCORE: 25
ADLS_ACUITY_SCORE: 23
ADLS_ACUITY_SCORE: 25
ADLS_ACUITY_SCORE: 23
ADLS_ACUITY_SCORE: 23
ADLS_ACUITY_SCORE: 25
ADLS_ACUITY_SCORE: 23
ADLS_ACUITY_SCORE: 25
ADLS_ACUITY_SCORE: 23

## 2021-11-17 ASSESSMENT — MIFFLIN-ST. JEOR: SCORE: 1381

## 2021-11-17 NOTE — PROGRESS NOTES
Pulmonary Medicine  Cystic Fibrosis - Lung Transplant Team  Progress Note  2021       Patient: Edson Thornton  MRN: [id number]  : 1965 (age 56 year old)  Transplant: 10/16/2021 (Lung), POD#32  Admission date: 2021    Assessment & Plan:     Edson Thornton is a 56 year old male with a PMH significant for NSIP/ILD, bronchiectasis, moderate PH, RA, SALTY, chronic HSV infection, hypogammaglobulinemia, steroid-induced diabetes, hypothyroidism, PFO, HTN, HLD, duodenal anomaly, anxiety, and depression.  Admitted on 21 from OSH for acute on chronic respiratory failure 2/2 ILD exacerbation, now s/p BSLT on 10/16/21.  Prolonged vent wean s/p trach and PEG/J tube placement with thoracic surgery 10/29.  Post-op course otherwise complicated by encephalopathy and diffuse weakness, acute to subacute CVA, afib with RVR, BRENNAN, GI bleed, and Candidemia/Candida empyema, and anxiety.  Code called  for bradycardia/asystole and progressively hypotensive, found to have acute drop in hemoglobin and bloody G tube output.  Intermittently tolerating PS/TD trials and with ongoing improvement in mentation and weakness.      Today's recommendations:    - TD/PS trials during the day per ICU team otherwise CMV between and overnight  - Diuresis per ICU team  - CXR daily while left chest tube in place  - Tacrolimus repeat level ordered   - Prednisone taper ordered   - Coccidioides Ag,  pending  - Continue Zosyn for 2 week course  - Continue fluconazole (reevaluate around ), EKG weekly for QTc monitoring (, ordered)  - Repeat IgG () low at 198. Repeat IVIG (with premedication)  ordered  - DSA monitoring q2 weeks, () ordered  - PHS high risk donor risk labs (11/15) pending  - May need LTACH for ongoing vent weaning (reevaluate around )      S/p BSLT for ILD:  Acute hypoxic respiratory failure s/p trach:   Bilateral pleural effusions: Unfortunately had not received vaccination for  flu, PNA, or COVID-19 PTA.  Explant pathology with NSIP, no malignancy.  PGD 2-3.  Weaned off paralytic 10/19 (for vent dyssynchrony) and Caroline 10/22.  Initial difficulty weaning sedation given agitation then with neurological findings as below.  Prolonged vent wean s/p trach and PEG/J tube placement with thoracic surgery 10/29.  Code called 11/2 for bradycardia/asystole (required 1 round of CPR, no medications) then progressively hypotensive, GI bleed as below.  Chest CT 11/2 with increased bilateral pleural effusions (moderate left, small right), bibasilar atelectasis with area of hypoenhancing parenchyma in LLL (suspicious for infection), and numerous nondisplaced anterior rib fractures bilaterally.  S/p left chest tube placement in IR 11/3 for pleural effusion/empyema (as below), right deferred given very little effusion on US.  Problems with trach cuff leak 11/3 (requiring multiple exchanges), bronch 11/14 with trach in good position with cuff well sealed in trachea and small to moderate secretions mostly in lower lobes.  Intermittently tolerating PS/TD trials, increased anxiety and dyspnea with PS overnight 11/14-15.  - Nebs: levalbuterol and Mucomyst QID  - Aggressive pulmonary toilet with chest physiotherapy QID  - TD/PS trials during the day per ICU team otherwise CMV between and overnight  - Diuresis per ICU team  - CXR daily while left chest tube in place, today with decreased bibasilar opacities and pleural effusions (personally reviewed with Dr. Wells)  - Repeat bronch not indicated today, revisit prn  - May need LTACH for ongoing vent weaning (reevaluate around 11/24)      Immunosuppression: s/p induction therapy with basiliximab 10/16 (and high dose IV steroid) and 10/20  - Tacrolimus 2 mg BID (suspension, via G tube).  Goal level 8-12.  Repeat level 11/19 (ordered).  - MMF 1000 mg BID (11/2, decreased given GI bleed, AZA to be avoided given TPMT)  - Prednisolone 12.5 mg BID, next taper due  11/21 (ordered)  Date AM dose (mg) PM dose (mg)   11/7/21 12.5 12.5   11/21/21 12.5 10   12/5/21 10 10   1/2/22 10 7.5   1/30/22 7.5 7.5   2/27/22 7.5 5   3/27/22 5 5   4/24/22 5 2.5      Prophylaxis:   - Bactrim for PJP ppx (held 11/2-11/5 d/t BRENNAN)  - Nystatin for oral candidiasis ppx, 6 month course  - See below for serologies and viral ppx:    Donor Recipient Intervention   CMV status Negative Negative None, CMV monthly (11/16, negative)   EBV status Positive Positive None, EBV monthly (11/16, negative)   HSV status N/A Positive S/p acyclovir POD #1-30 (recent infection history pre-txp)      ID: Concern for possible Strongyloides exposure pre-transplant s/p ivermectin x1 dose (9/17).  Donor and recipient cultures NGTD.  S/p IV ceftazidime/vancomycin for 48h per protocol and additional empiric ceftazidime 10/19-10/23 given recurrent fevers as below.  Cryptococcal Ag negative 10/23.  - Monthly Coccidioides Ag x12 months post-transplant per ID (urine and serum negative 10/17), 11/17 pending     Klebsiella pneumoniae:  Pseudomonas fluorescens/putida: Klebsiella initially noted trach sputum culture 11/10 (R-ampicillin, ciprofloxacin; I-ampicillin/sulbactam, levofloxacin).  Started on ceftriaxone 11/11, transitioned to ertapenem 11/12-11/13, and back to ceftriaxone 11/14.  Bronch culture 11/12 with Klebsiella and Pseudomonas fluorescens/putida (R-meropenem).     - ABX: Zosyn (11/15) for 2 week course     Recurring fevers, Resolved:  Fevers post-op, Tmax 101.7 POD #1.  Febrile with worsening leukocytosis again 10/25, generally persisting.  LP (10/29), xanthochromic with pleocytosis thought to be appropriate given RBC and WBCs, no ABX recommended per transplant ID and neurology.  S/p empiric meropenem 10/28-11/2.  Now afebrile, ABX as above.     Disseminated Candida: Noted on blood cultures 10/20 and 10/22.  BDG fungitell positive (399) on 10/20.  Respiratory cultures with persistent Candida albicans.  TC 10/23 without  evidence of endocarditis.  Ophthalmology consult 10/24 with benign dilated fundoscopic exam.  Candida empyema also noted 10/25, chest tubes inadvertently removed by CVTS 10/28, left chest tube replaced by IR 11/3 as above with ongoing Candida on cultures.    - Fluconazole (10/26 per transplant ID, prior micafungin 10/22-10/27), repeat EKG for QTc monitoring 11/23 (ordered), LFTs as below (see transplant ID note 11/5, will need repeat imaging to ensure left pleural effusion almost entirely resolved before removing chest tube and concluding fluconazole, reevaluate around 11/24)      HSV: Chronic intermittent active infection pre-transplant with recent HSV infection: crusted lesions throughout left side of jaw, s/p 10 day treatment course of ACV through 10/9.  HSV PCR blood negative 10/17.  S/p ACV ppx as above (started POD #1 instead of POD #8 given HSV history and location).     Hypogammaglobulinemia: IgG previously low at 364 (9/7).  Noted at 265 at time of transplant, s/p IVIG with premedication 10/21.    - Repeat IgG (11/17) low at 198. Repeat IVIG (with premedication) 11/18 ordered     Positive cross match: Note that he received two doses of rituximab in June, which is likely contributing to cross match result.  DSA most recently negative 11/15.  - DSA monitoring q2 weeks, (11/29) ordered     PHS risk criteria donor:  Additional labs required post-transplant (between 4-8 weeks post-op): Hepatitis B, Hepatitis C, and HIV by VISHAL (11/15, negative)     SALTY: Noted pre-transplant.  Home CPAP 6-12 cm H2O.  - Resume BiPAP after decannulation.     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  MRI 10/23 with multifocal subacute infarcts within both cerebral  hemispheres and left cerebellum.  DDx include surgery v embolic v infectious.  Heparin drip started 10/23 (intermittently held with GI bleed as below).  Repeat stroke code 10/25 d/t marked decrease in responsiveness with sedation wean, pupil inequality, and absent gag reflex.  CTA head without obvious new pathology, MRI brain (with and without contrast) primarily revealing for infarct, low likelihood of PRES.  Ammonia normal.  VEEG per neuro with severe diffuse encephalopathy.  Gradual improvement noted since 10/29, repeat head CT 11/2 (following code) without new acute intracranial abnormalities.  - AC management per primary team as below  - PT/OT     Afib with RVR: Noted 10/18, started on amiodarone drip and converted to SR, Transitioned to PO 10/21, dose decreased 10/29 (anticipate 4 week course). AC as above.  Metoprolol resumed 11/3.     Right subclavian DVT: Seen on UE US from 11/4.  Heparin drip resumed 11/5.        Recurrent GI bleed, Resolved: Hemoglobin dropped to 6.6 10/22, s/p 2 units pRBC.  OG tube with bloody output, EGD 10/23 noted NJ/OG tube trauma with scant oozing.  Progressive hypotension 11/2, hemoglobin 5.8 with bloody G tube output.  Heparin drip held, transitioned to PPI drip, and MTP activated.  EGD 11/2 with large amount of clotted blood in stomach and area of raised mucosa with small adherent clot near PEG tube site that was clipped, no active bleeding.  Abdominal CT 11/2 with stomach distended with blood products and dilated small bowel.  Maroon stools 11/3, repeat EGD with extensive old blood in stomach, no active bleeding, small nodular area with prior clips clipped again.  Most recent EGD 11/5 with ulcer noted at PEG tube bumper site, gastritis, and suction marks from G tube, and GJ arm is in the duodenal bulb.  Large amount of TF noted in G tube drainage 11/7.  AXR 11/9 with GJ tube tip projecting over proximal duodenum.  GI exchanged GJ tube 11/9.     BRENNAN, Resolved:   Hyperkalemia,  "Resolved: Baseline creatinine 0.7.  BRENNAN noted POD #1, UO also downtrending.  Improved with aggressive diuresis, Cr worsened again (1.38 on 11/2) along with up trending BUN and hyperkalemia.  Nephrology consulted 11/1, thought to be prerenal/component of ATN.  Now resolved.     Elevated LFTs: Shock liver post-op.  Initial ALT//230 evening of surgery, then with marked increase to 1550/1246 POD #1.  Intermittent alk phos elevation since.     We appreciate the excellent care provided by the CVICU and CVTS teams.  Recommendations communicated via in person rounding and this note.  Will continue to follow along closely, please do not hesitate to call with any questions or concerns.     Patient seen and discussed with Dr. Wells.    Betina Jeffrey, APRN, CNP   Inpatient Nurse Practitioner  Pulmonary CF/Transplant  Pager #[phone]       Subjective & Interval History:     PS yesterday at 10/8 50% for 3 hours. Otherwise on CMV 12/460/8/50. Improved anxiety/aggitation on Seroquel, propofol was weaned off this morning. Appeared comfortable during visit. Following commands. On PS this morning at 10/8 50%. CPT x4 yesterday. Up to chair with PT/lift, stood with heavy assist. Left chest tube remains, low output. Net +277mL with diuresis yesterday. Afebrile with improved leukocytosis. Denies pain.    Review of Systems:     ROS as above otherwise limited due to sedation/communication barriers    Physical Exam:     Vital signs:  Temp: 97.7  F (36.5  C) Temp src: Oral BP: 129/88 Pulse: 82   Resp: 16 SpO2: 98 % O2 Device: Mechanical Ventilator Oxygen Delivery: 45 LPM Height: 162.6 cm (5' 4\") Weight: 64 kg (141 lb 1.5 oz)  I/O:     Intake/Output Summary (Last 24 hours) at 11/17/2021 1349  Last data filed at 11/17/2021 1300  Gross per 24 hour   Intake 2907.87 ml   Output 3080 ml   Net -172.13 ml     Constitutional: lying in bed, in no apparent distress.   HEENT: Eyes with pink conjunctivae, anicteric.  Oral mucosa moist without " lesions.    PULM: Fair air flow bilaterally.  Scattered crackles greater bibasilar, no rhonchi, no wheezes.  Non-labored breathing on PS.  CV: Normal S1 and S2.  RRR.  No murmur, gallop, or rub.  No peripheral edema.   ABD: NABS, soft, nondistended.    MSK:  Able to squeeze left > right hand and move BLE.  + apparent muscle wasting.   NEURO: Alert, follows commands, nonverbal communication by head nod/shake  SKIN: Warm, dry.  No rash on limited exam.   PSYCH: Mood stable.     Lines, Drains, and Devices:  Peripheral IV 11/02/21 Anterior;Right Upper forearm (Active)   Site Assessment WD 11/17/21 1200   Line Status Infusing;Checked every 1-2 hour 11/17/21 1200   Dressing Intervention Dressing reinforced 11/08/21 2000   Phlebitis Scale 0-->no symptoms 11/17/21 1200   Infiltration Scale 0 11/17/21 1200   Infiltration Site Treatment Method  None 11/10/21 0400   Number of days: 15       PICC Triple Lumen 11/04/21 Left Basilic Access. PICC okay to use. (Active)   Site Assessment WDL 11/17/21 1200   External Cath Length (cm) 1 cm 11/04/21 1747   Extremity Circumference (cm) 28 cm 11/04/21 1747   Dressing Intervention Chlorhexidine patch;Transparent 11/17/21 1200   Dressing Change Due 11/21/21 11/17/21 0800   Mosley - Status heparin locked 11/17/21 1200   Mosley - Cap Change Due 11/19/21 11/17/21 0800   Red - Status infusing 11/17/21 1200   Red - Cap Change Due 11/19/21 11/17/21 0800   White - Status infusing 11/17/21 1200   White - Cap Change Due 11/19/21 11/17/21 0800   Extravasation? No 11/17/21 0800   Line Necessity Yes, meets criteria 11/17/21 1200   Number of days: 13     Data:     LABS    CMP:   Recent Labs   Lab 11/17/21  1108 11/17/21  0743 11/17/21  0337 11/17/21  0336 11/16/21  1934 11/16/21  1600 11/16/21  0855 11/16/21  0357 11/15/21  1951 11/15/21  1610 11/15/21  0749 11/15/21  0344 11/14/21  0307 11/14/21  0258   NA  --   --  140  --   --  143  --  139  139  --  139  --  140  140   < > 140  140   POTASSIUM  --    --  4.2  --   --  3.4  --  4.0  4.0  --  4.4  --  4.6  4.6   < > 4.4  4.4   CHLORIDE  --   --  103  --   --  107  --  102  102  --  105  --  106  106   < > 105  105   CO2  --   --  30  --   --  24  --  28  28  --  26  --  31  31   < > 30  30   ANIONGAP  --   --  7  --   --  12  --  9  9  --  8  --  3  3   < > 5  5   * 172* 210* 191*   < > 234*  204*   < > 216*  216*   < > 205*   < > 191*  191*   < > 172*  172*   BUN  --   --  45*  --   --  41*  --  44*  44*  --  42*  --  37*  37*   < > 47*  47*   CR  --   --  0.77  --   --  0.72  --  0.62*  0.62*  --  0.58*  --  0.47*  0.47*   < > 0.63*  0.63*   GFRESTIMATED  --   --  >90  --   --  >90  --  >90  >90  --  >90  --  >90  >90   < > >90  >90   ERIK  --   --  8.7  --   --  7.4*  --  9.1  9.1  --  8.9  --  8.7  8.7   < > 8.8  8.8   MAG  --   --  1.8  --   --   --   --  1.7  --   --   --  1.5*  --  1.5*   PHOS  --   --  4.2  --   --   --   --  5.4*  --   --   --  3.6  --  4.2   PROTTOTAL  --   --  5.3*  --   --   --   --  5.4*  --   --   --  5.4*  --  5.6*   ALBUMIN  --   --  2.1*  --   --   --   --  2.1*  --   --   --  2.1*  --  2.1*   BILITOTAL  --   --  0.2  --   --   --   --  0.2  --   --   --  0.2  --  0.2   ALKPHOS  --   --  150  --   --   --   --  177*  --   --   --  150  --  160*   AST  --   --  15  --   --   --   --  17  --   --   --  16  --  18   ALT  --   --  30  --   --   --   --  35  --   --   --  33  --  33    < > = values in this interval not displayed.     CBC:   Recent Labs   Lab 11/17/21  0337 11/16/21  0357 11/15/21  0344 11/14/21  0258   WBC 13.7* 14.3* 11.2* 16.9*   RBC 2.79* 3.08* 3.14* 3.25*   HGB 8.6* 9.5* 9.7* 9.9*   HCT 27.6* 30.5* 30.8* 31.6*   MCV 99 99 98 97   MCH 30.8 30.8 30.9 30.5   MCHC 31.2* 31.1* 31.5 31.3*   RDW 17.1* 17.2* 17.2* 17.4*   * 141* 160 176       INR:   Recent Labs   Lab 11/17/21  0337 11/16/21  0357 11/15/21  0344 11/14/21  0258   INR 1.23* 1.18* 1.15 1.17*       Glucose:   Recent  Labs   Lab 11/17/21  1108 11/17/21  0743 11/17/21  0337 11/17/21  0336 11/16/21  2355 11/16/21  1934   * 172* 210* 191* 179* 160*       Blood Gas:   Recent Labs   Lab 11/15/21  1012 11/14/21  0534 11/11/21  1740 11/11/21  1516   PHV 7.40 7.43  --  7.43   PCO2V 46 47  --  40   PO2V 40 38  --  34   HCO3V 29* 31*  --  26   LORI 3.3* 5.8*  --  1.8   O2PER 50 30 40 40       Culture Data No results for input(s): CULT in the last 168 hours.    Virology Data:   Lab Results   Component Value Date    FLUAH1 Negative 11/12/2021    FLUAH3 Negative 11/12/2021    [id number] Negative 11/12/2021    IFLUB Negative 11/12/2021    RSVA Negative 11/12/2021    RSVB Negative 11/12/2021    PIV1 Negative 11/12/2021    PIV2 Negative 11/12/2021    PIV3 Negative 11/12/2021    HMPV Negative 11/12/2021    HRVS Negative 11/12/2021    ADVBE Negative 11/12/2021    ADVC Negative 11/12/2021    ADVC Negative 10/17/2021       Historical CMV results (last 3 of prior testing):  Lab Results   Component Value Date    CMVQNT Not Detected 11/16/2021    CMVQNT Not Detected 11/12/2021    CMVQNT Not Detected 10/26/2021     No results found for: CMVLOG    Urine Studies    Recent Labs   Lab Test 11/01/21  1336 10/25/21  1507   URINEPH 5.5 5.5   NITRITE Negative Negative   LEUKEST Negative Negative   WBCU 0 2       Most Recent [facility] Pulmonary Function Testing (FVC/FEV1 only)  No results found for: 20002  No results found for: 20003  No results found for: 20015  No results found for: 20016    IMAGING    Recent Results (from the past 48 hour(s))   XR Chest Port 1 View    Narrative    Exam: XR CHEST PORT 1 VIEW, 11/16/2021 12:57 AM    Comparison: Chest x-ray 11/15/2021    History: s/p lung transplant    Findings:  A single portable AP semiupright view of the chest is obtained.  Tracheostomy tube tip is in the midthoracic trachea. Postoperative  changes of bilateral lung transplantation, clamshell sternotomy wires  are intact. Left sided chest tube in place. Left  upper extremity PICC  tip terminates near the superior cavoatrial junction.    Trachea is midline. Mediastinum is within normal limits.  Cardiopulmonary silhouette is within normal limits. Slight increase in  right basilar consolidation. Trace bilateral pleural effusions. No  appreciable pneumothorax. The upper abdomen is unremarkable. Osseous  structures are unchanged.      Impression    Impression:   1. Slight increase in right basilar opacity which may represent  infection or atelectasis.  2. Trace bilateral pleural effusions with associated atelectasis.  3. No appreciable pneumothorax.    I have personally reviewed the examination and initial interpretation  and I agree with the findings.    ROSS OLMSTEAD MD         SYSTEM ID:  [id number]   XR Chest Port 1 View    Narrative    Exam: XR CHEST PORT 1 VIEW, 11/17/2021 1:05 AM    Comparison: Chest x-ray 11/16/2021    History: s/p lung transplant    Findings:  A single portable AP semiupright view of the chest is obtained.  Postoperative changes of bilateral lung transplantation, clamshell  sternotomy wires are intact. Tracheostomy tube tip is in the mid  thoracic trachea. Left upper extremity PICC tip terminates in the  superior cavoatrial junction. Unchanged position of left basilar chest  tube.    Trachea is midline. Mediastinum is within normal limits.  Cardiopulmonary silhouette is within normal limits. Right reduction in  basilar opacities. Slightly reduced bilateral pleural effusions. No  pneumothorax. The upper abdomen is unremarkable.      Impression    Impression:   1. Slight reduction in bibasilar pleural effusions and associated  atelectasis.  2. Reduction in right basilar opacity.    I have personally reviewed the examination and initial interpretation  and I agree with the findings.    FITO PERALES MD         SYSTEM ID:  [id number]

## 2021-11-17 NOTE — CONSULTS
NEW INPATIENT DIABETES MANAGEMENT CONSULT  Edson Thornton  Age: 56 year old  MRN # [id number]   YOB: 1965    Chief Complaint: TF induced hyperglycemia  Reason for Consult: glucose management  Consulting Provider: Tammie Perez MD     History of Present Illness: Edson Thornton is a 56 year old male with a PMH significant for NSIP/ILD, bronchiectasis, moderate PH, RA, SALTY, chronic HSV infection, hypogammaglobulinemia, steroid-induced diabetes, hypothyroidism, PFO, HTN, HLD, duodenal anomaly, anxiety, and depression.  Admitted on 9/5/21 from OSH for acute on chronic respiratory failure 2/2 ILD exacerbation, now s/p BSLT on 10/16/21.  Prolonged vent wean s/p trach and PEG/J tube placement with thoracic surgery 10/29.  Post-op course otherwise complicated by encephalopathy and diffuse weakness, acute to subacute CVA, afib with RVR, BRENNAN, GI bleed, and Candidemia/Candida empyema, and anxiety.  Code called 11/2 for bradycardia/asystole and progressively hypotensive, found to have acute drop in hemoglobin and bloody G tube output.  Intermittently tolerating PS/TD trials and with ongoing improvement in mentation and weakness.  Endocrinology consulted for glucose management.    Currently on continuous TF with Novasource Renal @ 45 ml/hr 198 g CHO.  Prednisolone 12.5 mg PO BID currently, pulmonary managing a long taper.        BG close to target, see trend below:      Other Active Medical Problems: S/P BSLT for ILD, GIB, acute to sub acute CVA, afib w/RVR, BRENNAN, Candidemia/Candida empyema  Diabetes Mellitus Type: steroid induced hyperglycemia  Duration:  Unsure  Diabetic Complications: ??  Prior to Admission Diabetes Regimen:   Patient nods head yes to these doses:  Lantus 5 units daily  Novolog 1:15g CHO with meals  novolog 3-11 units with meals and bedtime, ?sliding scale insulin   BG monitor: One Touch Verio  Frequency of checks: unknown    Diet: unable to discuss prior diet  Usual BG control PTA:   "controlled, with A1c 5.3 on 11/15/21, A1C prior to transplant was 6.6  History of DKA: none noted  Able to Detect Hypoglycemia: unable to discuss  Usual Diabetes Care Provider: PCP, [facility], saw endocrine for hypothyroidism  Primary Care Provider: Lia Spicer  Factors Impacting IP Glucose Control: critical illness, steroids, tube feeding  Current Diet:   Novasource Renal @ 45 ml/hr (1080 ml) + Prosource (1 pkt TID) provides 2280 kcal (36 kcal/kg), 98 g pro (2 g/kg), 198 g CHO, 774 ml free water, and 0+ g fiber daily. Micro/Nx: Certavite        10 point ROS completed with pertinent positives and negatives noted in the HPI  Past medical, family and social histories are reviewed and updated.    Social History    Tobacco: no use    Alcohol: no use    Marital Status: significant other    Place of Residence: [place], [place]    Family History   mother with DM 1    Physical Exam   /78   Pulse 75   Temp 97.2  F (36.2  C) (Axillary)   Resp 16   Ht 1.626 m (5' 4\")   Wt 64 kg (141 lb 1.5 oz)   SpO2 98%   BMI 24.22 kg/m    General: pleasant, in no distress.  Sitting up in chair.  HEENT: normocephalic, atraumatic. Oral mucous membranes moist.   Lungs: unlabored respiration, no cough, +trach  ABD: rounded, nondistended  Skin: warm and dry, no obvious lesions  MSK:  moves all extremities  Mental status:  alert, unable to assess orientation  Psych:   calm and appropriate interaction     Most Recent Laboratory Tests:  Recent Labs   Lab 11/17/21  0337   HGB 8.6*     Recent Labs   Lab 11/15/21  0344   A1C 5.3     Recent Labs   Lab 11/17/21  0337   CR 0.77     Recent Labs   Lab 11/17/21  0743 11/17/21  0337 11/17/21  0336 11/16/21  2355 11/16/21  1934 11/16/21  1600   * 210* 191* 179* 160* 234*  204*       Assessment:   1) steroid induced diabetes mellitus  2) tube feeding induced hyperglycemia  3) S/P BSLT 10/16/21    Plan:    -increase Lantus to 45 units daily at 1200   -Novolog high resistance sliding " scale Q 4 hours   -BG monitoring Q 4 hours   -hypoglycemia protocol   -diabetes education needs will be assessed closer to discharge   -on discharge, will recommend outpatient follow up with [facility] service or endocrinology closer to home in [place], [place]    Discussed plan of care with nursing, patient, and primary team.  Called S.O.  Thank you for this consult; Inpatient Diabetes will continue to follow.     To contact Endocrine Diabetes service:   From 8AM-4PM: page inpatient diabetes provider that is following the patient  For questions or updates from 4PM-8AM: page the diabetes job code for on call fellow: [phone]      80 minutes spent on the date of the encounter doing chart review, history and exam, documentation and further activities per the note      Over 50% of my time on the unit was spent counseling the patient and/or coordinating care regarding acute hyperglycemia management.  See note for details.    ILIR Salomon, CNP  Inpatient Diabetes Management Service  Pager [phone]

## 2021-11-17 NOTE — PROGRESS NOTES
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 Corral. 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  UGIB causing code blue on 11/2.     OVERNIGHT EVENTS:  -NAEO    TODAY'S PROGRESS:   - wean fentanyl as tolerated  - add PRN oxycodone  - Restart Metoprolol at 25 BID  - Decrease Lasix to 40 BID  - Endocrine consult for BG management    PLAN:  Neuro/ pain/ sedation:  Acute postoperative pain   Anxiety and depression  Acute to subacute CVA, b/l cerebral hemispheres and L cerebellum, suspect embolic  Encephalopathy and diffuse weakness - improving slightly   R ear lateral canal floor excoriation with bleeding - resolved   - Tylenol and oxycodone prn, lido patch    - wean fentanyl as tolerated  - PTA lexapro  - Propofol gtt  - Seroquel 25 BID               -PRN Seroquel    Pulmonary care:   SALTY on home CPAP  Acute hypoxic respiratory failure s/p b/l lung transplant 10/16/21  S/p tracheostomy 10/29  Empyema with Candida s/p chest tube 11/3/21  - mechanically ventilated via trach, PST, trach dome               - CMV overnight  - VBG after trach dome  - Levalbuterol nebs scheduled and Mucomyst, chest PT  - Daily CXR  - appreciate Pulmonary               -IGG 17 NOV    Cardiovascular:    HTN  Afib   PFO  Vasoplegic shock in setting of hypovolemia - resolved  - Monitor hemodynamic status.   - MAP goal <100, SBP <140  - metoprolol 75 BID, held due to bradycardia    - Restart at low dose 25 BID  - prn labetalol  - rosuvastatin 10 mg  -  Amiodarone 200 mg daily (end date 11/27/21)  - low-intensity heparin gtt    GI care:   Elevated LFTs - recurrent  GI bleed 2/2 NG trauma, now recurrent secondary to GJ bumper ulcer   Moderate malnutrition in the context of acute on chronic illness  PEG-J, with malpositioned J tube   - Diet: TF   - PPI BID    Fluids/ Electrolytes/ Nutrition:   Hypernatremia  Hyperkalemia, resolved   BL creat appears to be ~ 0.7  - free water flushes  - Goal 0 to -50   - Decrease Lasix to 40 BID  - Strict I/O, daily weights  - Avoid/limit nephrotoxins as able  - Replete lytes PRN per protocol    Endocrine:    Stress induced hyperglycemia with steroid-induced component, on DM2  Hypothyroidism  RA  Preop A1c 6.6  - Discontinue insulin gtt. High sliding scale insulin    - Goal BG <180 for optimal healing  - continue home synthroid  - lantus 30    - Endocrine consult    ID/ Antibiotics:  Immunosuppression s/p transplant  Candidal infection of donor lungs (likely source), pleural fluid, and fungemia   PNA  - NGTD CSF. Last + blood cx 10/22. No vegetations on valves per TC 10/23  - Nystatin, Acyclovir, bactrim   - Tacrolimus (via g tube), prednisone    - appreciate transplant ID, ophthalmology    - d/w ID, no indication to treat Staph epi in sputum   - Fluconazole for fungemia (re-evaluate on 11/24)   - Zosyn for klebsiella and Pseudomonas for two weeks for coverage    Heme:     Acute blood loss anemia secondary to surgery and GI bleed, and anemia related to critical illness and iron deficiency    Hypogammaglobulinemia s/p IVIG  Thrombocytopenia, HIT negative - resolved   Lactic acidosis, resolved  Nonocclusive R subclavian thrombus    - heparin low intensity  - Start cumadin 16 NOV    Prophylaxis:    - Mechanical prophylaxis for DVT: SCDs  - Chemical DVT prophylaxis: heparin low intensity gtt, start warfarin today  - PPI    Lines/ tubes/ drains:  - trach  - peg-j  - Watson 10/25; replaced 11/14  - PIVs  - chest tube L  - L  PICC    Disposition:  - CV ICU    Patient seen, findings and plan discussed with CV ICU staff    Aditya Jo  Medical Student    I saw and evaluated this patient with our medical student, Aditya Jo, on 11/18/21. I agree with the note and above plan as edited by me where appropriate.    Tammie Perez MD (PGY-3)  General Surgery    ====================================    SUBJECTIVE:   NAEO, resting comfortably in bed    OBJECTIVE:   1. VITAL SIGNS:   Temp:  [97.6  F (36.4  C)-99  F (37.2  C)] 97.9  F (36.6  C)  Pulse:  [] 90  Resp:  [14-25] 17  BP: ()/(57-94) 108/67  FiO2 (%):  [50 %] 50 %  SpO2:  [92 %-100 %] 97 %  Ventilation Mode: CMV/AC  (Continuous Mandatory Ventilation/ Assist Control)  FiO2 (%): 50 %  Rate Set (breaths/minute): 12 breaths/min  Tidal Volume Set (mL): 460 mL  PEEP (cm H2O): 8 cmH2O  Pressure Support (cm H2O): 10 cmH2O  Oxygen Concentration (%): 50 %  Resp: 17      2. INTAKE/ OUTPUT:   I/O last 3 completed shifts:  In: 3212.46 [I.V.:1232.46; NG/GT:945]  Out: 2890 [Urine:2355; Emesis/NG output:95; Stool:400; Chest Tube:40]    3. PHYSICAL EXAMINATION:   General: Alert individual sitting in bed,  Neuro: Alert and Oriented, resting comfortably   Resp: Trach present, breathing comfortably  CV: Intermittent Sinus Tachycardia  Abdomen: Soft, Non-distended, Non-tender  Incisions: c/d/i  Extremities: warm and well perfused    4. INVESTIGATIONS:   Arterial Blood Gases   Recent Labs   Lab 11/11/21  1740   PH 7.48*   PCO2 37   PO2 106*   HCO3 28     Complete Blood Count   Recent Labs   Lab 11/17/21  0337 11/16/21  0357 11/15/21  0344 11/14/21  0258   WBC 13.7* 14.3* 11.2* 16.9*   HGB 8.6* 9.5* 9.7* 9.9*   * 141* 160 176     Basic Metabolic Panel  Recent Labs   Lab 11/17/21  0337 11/17/21  0336 11/16/21  2355 11/16/21  1934 11/16/21  1600 11/16/21  0855 11/16/21  0357 11/15/21  1951 11/15/21  1610     --   --   --  143  --  139  139  --  139   POTASSIUM 4.2  --   --   --  3.4  --  4.0   4.0  --  4.4   CHLORIDE 103  --   --   --  107  --  102  102  --  105   CO2 30  --   --   --  24  --  28  28  --  26   BUN 45*  --   --   --  41*  --  44*  44*  --  42*   CR 0.77  --   --   --  0.72  --  0.62*  0.62*  --  0.58*   * 191* 179* 160* 234*  204*   < > 216*  216*   < > 205*    < > = values in this interval not displayed.     Liver Function Tests  Recent Labs   Lab 11/17/21  0337 11/16/21  0357 11/15/21  0344 11/14/21  0258   AST 15 17 16 18   ALT 30 35 33 33   ALKPHOS 150 177* 150 160*   BILITOTAL 0.2 0.2 0.2 0.2   ALBUMIN 2.1* 2.1* 2.1* 2.1*   INR 1.23* 1.18* 1.15 1.17*     Pancreatic Enzymes  No lab results found in last 7 days.  Coagulation Profile  Recent Labs   Lab 11/17/21  0337 11/16/21  0357 11/15/21  0344 11/14/21  0258   INR 1.23* 1.18* 1.15 1.17*         5. RADIOLOGY:   No results found for this or any previous visit (from the past 24 hour(s)).    =========================================

## 2021-11-17 NOTE — PLAN OF CARE
Major Shift Events: Neuro status unchanged, pt follows commands, moves all extremities, on fentanyl and propofol for sedation and anxiety. NSR-ST on telemetry, BP stable. Bivona #6 hyperflex trach. On CMV settings all night at 50% FiO2, tolerated well. Suctioned q3-4h. Adequate UOP via metzger, BM per rectal tube. TF running at goal through J tube, G-tube to gravity. CT in place with minimal output. Q2h turn and repo overnight.     Plan: Continue to wean sedation as tolerated for anxiety, PS as able, work with therapy.    For vital signs and complete assessments, please see documentation flowsheets.

## 2021-11-18 ENCOUNTER — APPOINTMENT (OUTPATIENT)
Dept: GENERAL RADIOLOGY | Facility: CLINIC | Age: 56
End: 2021-11-18
Attending: STUDENT IN AN ORGANIZED HEALTH CARE EDUCATION/TRAINING PROGRAM
Payer: COMMERCIAL

## 2021-11-18 ENCOUNTER — APPOINTMENT (OUTPATIENT)
Dept: PHYSICAL THERAPY | Facility: CLINIC | Age: 56
End: 2021-11-18
Attending: STUDENT IN AN ORGANIZED HEALTH CARE EDUCATION/TRAINING PROGRAM
Payer: COMMERCIAL

## 2021-11-18 ENCOUNTER — APPOINTMENT (OUTPATIENT)
Dept: OCCUPATIONAL THERAPY | Facility: CLINIC | Age: 56
End: 2021-11-18
Attending: STUDENT IN AN ORGANIZED HEALTH CARE EDUCATION/TRAINING PROGRAM
Payer: COMMERCIAL

## 2021-11-18 LAB
ALBUMIN SERPL-MCNC: 1.8 G/DL (ref 3.4–5)
ALP SERPL-CCNC: 123 U/L (ref 40–150)
ALT SERPL W P-5'-P-CCNC: 28 U/L (ref 0–70)
ANION GAP SERPL CALCULATED.3IONS-SCNC: 6 MMOL/L (ref 3–14)
ANION GAP SERPL CALCULATED.3IONS-SCNC: 8 MMOL/L (ref 3–14)
AST SERPL W P-5'-P-CCNC: 14 U/L (ref 0–45)
BILIRUB SERPL-MCNC: 0.2 MG/DL (ref 0.2–1.3)
BUN SERPL-MCNC: 42 MG/DL (ref 7–30)
BUN SERPL-MCNC: 48 MG/DL (ref 7–30)
CALCIUM SERPL-MCNC: 7.8 MG/DL (ref 8.5–10.1)
CALCIUM SERPL-MCNC: 7.9 MG/DL (ref 8.5–10.1)
CHLORIDE BLD-SCNC: 102 MMOL/L (ref 94–109)
CHLORIDE BLD-SCNC: 106 MMOL/L (ref 94–109)
CO2 SERPL-SCNC: 28 MMOL/L (ref 20–32)
CO2 SERPL-SCNC: 28 MMOL/L (ref 20–32)
CREAT SERPL-MCNC: 0.67 MG/DL (ref 0.66–1.25)
CREAT SERPL-MCNC: 0.78 MG/DL (ref 0.66–1.25)
ERYTHROCYTE [DISTWIDTH] IN BLOOD BY AUTOMATED COUNT: 17.2 % (ref 10–15)
GFR SERPL CREATININE-BSD FRML MDRD: >90 ML/MIN/1.73M2
GFR SERPL CREATININE-BSD FRML MDRD: >90 ML/MIN/1.73M2
GLUCOSE BLD-MCNC: 168 MG/DL (ref 70–99)
GLUCOSE BLD-MCNC: 201 MG/DL (ref 70–99)
GLUCOSE BLDC GLUCOMTR-MCNC: 133 MG/DL (ref 70–99)
GLUCOSE BLDC GLUCOMTR-MCNC: 146 MG/DL (ref 70–99)
GLUCOSE BLDC GLUCOMTR-MCNC: 154 MG/DL (ref 70–99)
GLUCOSE BLDC GLUCOMTR-MCNC: 200 MG/DL (ref 70–99)
GLUCOSE BLDC GLUCOMTR-MCNC: 209 MG/DL (ref 70–99)
HCT VFR BLD AUTO: 25.5 % (ref 40–53)
HGB BLD-MCNC: 8 G/DL (ref 13.3–17.7)
INR PPP: 1.4 (ref 0.85–1.15)
MAGNESIUM SERPL-MCNC: 2.2 MG/DL (ref 1.6–2.3)
MCH RBC QN AUTO: 31 PG (ref 26.5–33)
MCHC RBC AUTO-ENTMCNC: 31.4 G/DL (ref 31.5–36.5)
MCV RBC AUTO: 99 FL (ref 78–100)
PHOSPHATE SERPL-MCNC: 3.5 MG/DL (ref 2.5–4.5)
PLATELET # BLD AUTO: 117 10E3/UL (ref 150–450)
POTASSIUM BLD-SCNC: 3.8 MMOL/L (ref 3.4–5.3)
POTASSIUM BLD-SCNC: 4.4 MMOL/L (ref 3.4–5.3)
PROT SERPL-MCNC: 4.9 G/DL (ref 6.8–8.8)
RBC # BLD AUTO: 2.58 10E6/UL (ref 4.4–5.9)
SODIUM SERPL-SCNC: 136 MMOL/L (ref 133–144)
SODIUM SERPL-SCNC: 142 MMOL/L (ref 133–144)
UFH PPP CHRO-ACNC: 0.31 IU/ML
UFH PPP CHRO-ACNC: 0.33 IU/ML
UFH PPP CHRO-ACNC: 0.54 IU/ML
WBC # BLD AUTO: 13.1 10E3/UL (ref 4–11)

## 2021-11-18 PROCEDURE — 250N000012 HC RX MED GY IP 250 OP 636 PS 637: Performed by: NURSE PRACTITIONER

## 2021-11-18 PROCEDURE — 97110 THERAPEUTIC EXERCISES: CPT | Mod: GP

## 2021-11-18 PROCEDURE — 87103 BLOOD FUNGUS CULTURE: CPT | Performed by: NURSE PRACTITIONER

## 2021-11-18 PROCEDURE — 71045 X-RAY EXAM CHEST 1 VIEW: CPT | Mod: 77

## 2021-11-18 PROCEDURE — 87106 FUNGI IDENTIFICATION YEAST: CPT | Performed by: STUDENT IN AN ORGANIZED HEALTH CARE EDUCATION/TRAINING PROGRAM

## 2021-11-18 PROCEDURE — 71045 X-RAY EXAM CHEST 1 VIEW: CPT

## 2021-11-18 PROCEDURE — 85610 PROTHROMBIN TIME: CPT | Performed by: THORACIC SURGERY (CARDIOTHORACIC VASCULAR SURGERY)

## 2021-11-18 PROCEDURE — 250N000009 HC RX 250: Performed by: STUDENT IN AN ORGANIZED HEALTH CARE EDUCATION/TRAINING PROGRAM

## 2021-11-18 PROCEDURE — 250N000013 HC RX MED GY IP 250 OP 250 PS 637: Performed by: NURSE PRACTITIONER

## 2021-11-18 PROCEDURE — 999N000157 HC STATISTIC RCP TIME EA 10 MIN

## 2021-11-18 PROCEDURE — 94640 AIRWAY INHALATION TREATMENT: CPT | Mod: 76

## 2021-11-18 PROCEDURE — 250N000011 HC RX IP 250 OP 636

## 2021-11-18 PROCEDURE — 97110 THERAPEUTIC EXERCISES: CPT | Mod: GO | Performed by: OCCUPATIONAL THERAPIST

## 2021-11-18 PROCEDURE — 94668 MNPJ CHEST WALL SBSQ: CPT

## 2021-11-18 PROCEDURE — 36415 COLL VENOUS BLD VENIPUNCTURE: CPT | Performed by: STUDENT IN AN ORGANIZED HEALTH CARE EDUCATION/TRAINING PROGRAM

## 2021-11-18 PROCEDURE — 250N000013 HC RX MED GY IP 250 OP 250 PS 637: Performed by: STUDENT IN AN ORGANIZED HEALTH CARE EDUCATION/TRAINING PROGRAM

## 2021-11-18 PROCEDURE — 71045 X-RAY EXAM CHEST 1 VIEW: CPT | Mod: 26 | Performed by: RADIOLOGY

## 2021-11-18 PROCEDURE — 258N000003 HC RX IP 258 OP 636: Performed by: THORACIC SURGERY (CARDIOTHORACIC VASCULAR SURGERY)

## 2021-11-18 PROCEDURE — 200N000002 HC R&B ICU UMMC

## 2021-11-18 PROCEDURE — 99233 SBSQ HOSP IP/OBS HIGH 50: CPT | Performed by: NURSE PRACTITIONER

## 2021-11-18 PROCEDURE — 87040 BLOOD CULTURE FOR BACTERIA: CPT | Performed by: STUDENT IN AN ORGANIZED HEALTH CARE EDUCATION/TRAINING PROGRAM

## 2021-11-18 PROCEDURE — 250N000011 HC RX IP 250 OP 636: Performed by: STUDENT IN AN ORGANIZED HEALTH CARE EDUCATION/TRAINING PROGRAM

## 2021-11-18 PROCEDURE — 84100 ASSAY OF PHOSPHORUS: CPT | Performed by: THORACIC SURGERY (CARDIOTHORACIC VASCULAR SURGERY)

## 2021-11-18 PROCEDURE — 250N000013 HC RX MED GY IP 250 OP 250 PS 637: Performed by: THORACIC SURGERY (CARDIOTHORACIC VASCULAR SURGERY)

## 2021-11-18 PROCEDURE — 85027 COMPLETE CBC AUTOMATED: CPT | Performed by: THORACIC SURGERY (CARDIOTHORACIC VASCULAR SURGERY)

## 2021-11-18 PROCEDURE — 85520 HEPARIN ASSAY: CPT | Performed by: THORACIC SURGERY (CARDIOTHORACIC VASCULAR SURGERY)

## 2021-11-18 PROCEDURE — 250N000011 HC RX IP 250 OP 636: Performed by: NURSE PRACTITIONER

## 2021-11-18 PROCEDURE — 250N000012 HC RX MED GY IP 250 OP 636 PS 637: Performed by: PHYSICIAN ASSISTANT

## 2021-11-18 PROCEDURE — 250N000013 HC RX MED GY IP 250 OP 250 PS 637: Performed by: ANESTHESIOLOGY

## 2021-11-18 PROCEDURE — 80053 COMPREHEN METABOLIC PANEL: CPT | Performed by: THORACIC SURGERY (CARDIOTHORACIC VASCULAR SURGERY)

## 2021-11-18 PROCEDURE — 99233 SBSQ HOSP IP/OBS HIGH 50: CPT | Performed by: INTERNAL MEDICINE

## 2021-11-18 PROCEDURE — 83735 ASSAY OF MAGNESIUM: CPT | Performed by: THORACIC SURGERY (CARDIOTHORACIC VASCULAR SURGERY)

## 2021-11-18 PROCEDURE — 97530 THERAPEUTIC ACTIVITIES: CPT | Mod: GP

## 2021-11-18 PROCEDURE — 250N000012 HC RX MED GY IP 250 OP 636 PS 637: Performed by: INTERNAL MEDICINE

## 2021-11-18 PROCEDURE — 85520 HEPARIN ASSAY: CPT | Performed by: STUDENT IN AN ORGANIZED HEALTH CARE EDUCATION/TRAINING PROGRAM

## 2021-11-18 PROCEDURE — 250N000013 HC RX MED GY IP 250 OP 250 PS 637

## 2021-11-18 PROCEDURE — 94003 VENT MGMT INPAT SUBQ DAY: CPT

## 2021-11-18 PROCEDURE — 250N000011 HC RX IP 250 OP 636: Performed by: THORACIC SURGERY (CARDIOTHORACIC VASCULAR SURGERY)

## 2021-11-18 RX ORDER — WARFARIN SODIUM 2 MG/1
2 TABLET ORAL
Status: COMPLETED | OUTPATIENT
Start: 2021-11-18 | End: 2021-11-18

## 2021-11-18 RX ORDER — ALBUTEROL SULFATE 0.83 MG/ML
2.5 SOLUTION RESPIRATORY (INHALATION)
Status: DISCONTINUED | OUTPATIENT
Start: 2021-11-18 | End: 2021-12-13 | Stop reason: HOSPADM

## 2021-11-18 RX ORDER — ALBUTEROL SULFATE 0.83 MG/ML
2.5 SOLUTION RESPIRATORY (INHALATION)
Status: DISCONTINUED | OUTPATIENT
Start: 2021-11-18 | End: 2021-11-18

## 2021-11-18 RX ORDER — LORAZEPAM 0.5 MG/1
0.5 TABLET ORAL ONCE
Status: COMPLETED | OUTPATIENT
Start: 2021-11-18 | End: 2021-11-18

## 2021-11-18 RX ORDER — PROPOFOL 10 MG/ML
5-75 INJECTION, EMULSION INTRAVENOUS CONTINUOUS
Status: DISCONTINUED | OUTPATIENT
Start: 2021-11-18 | End: 2021-11-20

## 2021-11-18 RX ADMIN — PROPOFOL 15 MCG/KG/MIN: 10 INJECTION, EMULSION INTRAVENOUS at 11:33

## 2021-11-18 RX ADMIN — METOPROLOL TARTRATE 25 MG: 25 TABLET, FILM COATED ORAL at 20:30

## 2021-11-18 RX ADMIN — HYDROCORTISONE SODIUM SUCCINATE 100 MG: 100 INJECTION, POWDER, FOR SOLUTION INTRAMUSCULAR; INTRAVENOUS at 09:39

## 2021-11-18 RX ADMIN — MYCOPHENOLATE MOFETIL 1000 MG: 200 POWDER, FOR SUSPENSION ORAL at 20:30

## 2021-11-18 RX ADMIN — LORAZEPAM 0.5 MG: 0.5 TABLET ORAL at 12:23

## 2021-11-18 RX ADMIN — PIPERACILLIN SODIUM AND TAZOBACTAM SODIUM 4.5 G: 4; .5 INJECTION, POWDER, LYOPHILIZED, FOR SOLUTION INTRAVENOUS at 08:59

## 2021-11-18 RX ADMIN — Medication 1 PACKET: at 20:30

## 2021-11-18 RX ADMIN — FUROSEMIDE 40 MG: 10 INJECTION, SOLUTION INTRAVENOUS at 17:07

## 2021-11-18 RX ADMIN — LEVALBUTEROL HYDROCHLORIDE 1.25 MG: 1.25 SOLUTION RESPIRATORY (INHALATION) at 07:58

## 2021-11-18 RX ADMIN — INSULIN ASPART 1 UNITS: 100 INJECTION, SOLUTION INTRAVENOUS; SUBCUTANEOUS at 03:43

## 2021-11-18 RX ADMIN — Medication 40 MG: at 20:30

## 2021-11-18 RX ADMIN — ROSUVASTATIN CALCIUM 10 MG: 10 TABLET, FILM COATED ORAL at 08:55

## 2021-11-18 RX ADMIN — LEVOTHYROXINE SODIUM 25 MCG: 0.03 TABLET ORAL at 08:54

## 2021-11-18 RX ADMIN — ACETYLCYSTEINE 2 ML: 200 SOLUTION ORAL; RESPIRATORY (INHALATION) at 12:44

## 2021-11-18 RX ADMIN — QUETIAPINE FUMARATE 25 MG: 25 TABLET ORAL at 08:55

## 2021-11-18 RX ADMIN — NYSTATIN 1000000 UNITS: 500000 SUSPENSION ORAL at 20:30

## 2021-11-18 RX ADMIN — HYDROXYZINE HYDROCHLORIDE 50 MG: 50 TABLET, FILM COATED ORAL at 11:28

## 2021-11-18 RX ADMIN — NYSTATIN 1000000 UNITS: 500000 SUSPENSION ORAL at 16:02

## 2021-11-18 RX ADMIN — ESCITALOPRAM 5 MG: 5 TABLET, FILM COATED ORAL at 08:55

## 2021-11-18 RX ADMIN — QUETIAPINE FUMARATE 25 MG: 25 TABLET ORAL at 20:30

## 2021-11-18 RX ADMIN — PROPOFOL 30 MCG/KG/MIN: 10 INJECTION, EMULSION INTRAVENOUS at 18:05

## 2021-11-18 RX ADMIN — PREDNISOLONE 12.5 MG: 15 SOLUTION ORAL at 20:30

## 2021-11-18 RX ADMIN — Medication 10 MG: at 20:30

## 2021-11-18 RX ADMIN — OXYCODONE HYDROCHLORIDE 5 MG: 5 TABLET ORAL at 09:41

## 2021-11-18 RX ADMIN — TACROLIMUS 2 MG: 5 CAPSULE ORAL at 09:56

## 2021-11-18 RX ADMIN — ACETAMINOPHEN 650 MG: 325 TABLET, FILM COATED ORAL at 08:53

## 2021-11-18 RX ADMIN — Medication 1 PACKET: at 09:56

## 2021-11-18 RX ADMIN — WARFARIN SODIUM 2 MG: 2 TABLET ORAL at 17:11

## 2021-11-18 RX ADMIN — NYSTATIN 1000000 UNITS: 500000 SUSPENSION ORAL at 11:54

## 2021-11-18 RX ADMIN — POTASSIUM CHLORIDE 40 MEQ: 40 SOLUTION ORAL at 08:54

## 2021-11-18 RX ADMIN — PIPERACILLIN SODIUM AND TAZOBACTAM SODIUM 4.5 G: 4; .5 INJECTION, POWDER, LYOPHILIZED, FOR SOLUTION INTRAVENOUS at 03:34

## 2021-11-18 RX ADMIN — LIDOCAINE 2 PATCH: 560 PATCH PERCUTANEOUS; TOPICAL; TRANSDERMAL at 09:16

## 2021-11-18 RX ADMIN — INSULIN ASPART 1 UNITS: 100 INJECTION, SOLUTION INTRAVENOUS; SUBCUTANEOUS at 10:12

## 2021-11-18 RX ADMIN — FUROSEMIDE 40 MG: 10 INJECTION, SOLUTION INTRAVENOUS at 08:58

## 2021-11-18 RX ADMIN — CALCIUM CARBONATE 600 MG (1,500 MG)-VITAMIN D3 400 UNIT TABLET 1 TABLET: at 08:55

## 2021-11-18 RX ADMIN — SULFAMETHOXAZOLE AND TRIMETHOPRIM 1 TABLET: 400; 80 TABLET ORAL at 08:55

## 2021-11-18 RX ADMIN — Medication 40 MG: at 09:16

## 2021-11-18 RX ADMIN — INSULIN ASPART 3 UNITS: 100 INJECTION, SOLUTION INTRAVENOUS; SUBCUTANEOUS at 21:34

## 2021-11-18 RX ADMIN — PIPERACILLIN SODIUM AND TAZOBACTAM SODIUM 4.5 G: 4; .5 INJECTION, POWDER, LYOPHILIZED, FOR SOLUTION INTRAVENOUS at 15:59

## 2021-11-18 RX ADMIN — HUMAN IMMUNOGLOBULIN G 30 G: 20 LIQUID INTRAVENOUS at 12:23

## 2021-11-18 RX ADMIN — DIPHENHYDRAMINE HYDROCHLORIDE 50 MG: 50 INJECTION, SOLUTION INTRAMUSCULAR; INTRAVENOUS at 11:44

## 2021-11-18 RX ADMIN — Medication 1 TABLET: at 10:46

## 2021-11-18 RX ADMIN — QUETIAPINE FUMARATE 25 MG: 25 TABLET ORAL at 10:46

## 2021-11-18 RX ADMIN — PREDNISOLONE 12.5 MG: 15 SOLUTION ORAL at 09:53

## 2021-11-18 RX ADMIN — TACROLIMUS 2 MG: 5 CAPSULE ORAL at 17:11

## 2021-11-18 RX ADMIN — FLUCONAZOLE IN SODIUM CHLORIDE 400 MG: 2 INJECTION, SOLUTION INTRAVENOUS at 11:56

## 2021-11-18 RX ADMIN — AMIODARONE HYDROCHLORIDE 200 MG: 200 TABLET ORAL at 08:55

## 2021-11-18 RX ADMIN — ACETYLCYSTEINE 2 ML: 200 SOLUTION ORAL; RESPIRATORY (INHALATION) at 19:44

## 2021-11-18 RX ADMIN — Medication 1 PACKET: at 09:39

## 2021-11-18 RX ADMIN — NYSTATIN 1000000 UNITS: 500000 SUSPENSION ORAL at 09:55

## 2021-11-18 RX ADMIN — Medication 1 PACKET: at 14:18

## 2021-11-18 RX ADMIN — ACETYLCYSTEINE 2 ML: 200 SOLUTION ORAL; RESPIRATORY (INHALATION) at 07:58

## 2021-11-18 RX ADMIN — INSULIN ASPART 1 UNITS: 100 INJECTION, SOLUTION INTRAVENOUS; SUBCUTANEOUS at 23:57

## 2021-11-18 RX ADMIN — LEVALBUTEROL HYDROCHLORIDE 1.25 MG: 1.25 SOLUTION RESPIRATORY (INHALATION) at 19:44

## 2021-11-18 RX ADMIN — HEPARIN SODIUM 1100 UNITS/HR: 1000 INJECTION INTRAVENOUS; SUBCUTANEOUS at 03:34

## 2021-11-18 RX ADMIN — INSULIN ASPART 3 UNITS: 100 INJECTION, SOLUTION INTRAVENOUS; SUBCUTANEOUS at 16:12

## 2021-11-18 RX ADMIN — MULTIVIT AND MINERALS-FERROUS GLUCONATE 9 MG IRON/15 ML ORAL LIQUID 15 ML: at 08:54

## 2021-11-18 RX ADMIN — LEVALBUTEROL HYDROCHLORIDE 1.25 MG: 1.25 SOLUTION RESPIRATORY (INHALATION) at 16:19

## 2021-11-18 RX ADMIN — ACETYLCYSTEINE 2 ML: 200 SOLUTION ORAL; RESPIRATORY (INHALATION) at 16:18

## 2021-11-18 RX ADMIN — MYCOPHENOLATE MOFETIL 1000 MG: 200 POWDER, FOR SUSPENSION ORAL at 09:55

## 2021-11-18 RX ADMIN — LEVALBUTEROL HYDROCHLORIDE 1.25 MG: 1.25 SOLUTION RESPIRATORY (INHALATION) at 12:44

## 2021-11-18 RX ADMIN — INSULIN GLARGINE 50 UNITS: 100 INJECTION, SOLUTION SUBCUTANEOUS at 11:54

## 2021-11-18 RX ADMIN — CALCIUM CARBONATE 600 MG (1,500 MG)-VITAMIN D3 400 UNIT TABLET 1 TABLET: at 17:08

## 2021-11-18 RX ADMIN — PIPERACILLIN SODIUM AND TAZOBACTAM SODIUM 4.5 G: 4; .5 INJECTION, POWDER, LYOPHILIZED, FOR SOLUTION INTRAVENOUS at 21:37

## 2021-11-18 RX ADMIN — METOPROLOL TARTRATE 25 MG: 25 TABLET, FILM COATED ORAL at 08:56

## 2021-11-18 ASSESSMENT — ACTIVITIES OF DAILY LIVING (ADL)
ADLS_ACUITY_SCORE: 23
ADLS_ACUITY_SCORE: 25
ADLS_ACUITY_SCORE: 23
ADLS_ACUITY_SCORE: 25
ADLS_ACUITY_SCORE: 25
ADLS_ACUITY_SCORE: 23

## 2021-11-18 ASSESSMENT — MIFFLIN-ST. JEOR: SCORE: 1377

## 2021-11-18 NOTE — PROGRESS NOTES
IP Diabetes Management  Daily Note           Assessment and Plan:   HPI: Edson Thornton is a 56 year old male with a PMH significant for NSIP/ILD, bronchiectasis, moderate PH, RA, SALTY, chronic HSV infection, hypogammaglobulinemia, steroid-induced diabetes, hypothyroidism, PFO, HTN, HLD, duodenal anomaly, anxiety, and depression.  Admitted on 9/5/21 from OSH for acute on chronic respiratory failure 2/2 ILD exacerbation, now s/p BSLT on 10/16/21.  Prolonged vent wean s/p trach and PEG/J tube placement with thoracic surgery 10/29.  Post-op course otherwise complicated by encephalopathy and diffuse weakness, acute to subacute CVA, afib with RVR, BRENNAN, GI bleed, and Candidemia/Candida empyema, and anxiety.  Code called 11/2 for bradycardia/asystole and progressively hypotensive, found to have acute drop in hemoglobin and bloody G tube output.  Intermittently tolerating PS/TD trials and with ongoing improvement in mentation and weakness.  Endocrinology consulted for glucose management.        Assessment:   1)steroid induced Diabetes Mellitus, controlled (A1c 6.6 prior to transplant), with TF and steroid induced hyperglycemia  2) tube feeding induced hyperglycemia  3) S/P BSLT 10/16/21      Plan:    -increase  Lantus to 50 units daily at 1200                 -Novolog high resistance sliding scale Q 4 hours                 -BG monitoring Q 4 hours                 -hypoglycemia protocol                 -diabetes education needs will be assessed closer to discharge                 -on discharge, will recommend outpatient follow up with [facility] service or endocrinology closer to home in [place], [place]       Plan discussed with bedside RN, and primary team through this note.        Interval History and Assessment: interval glucose trend reviewed:         BG near or within target<180.  Will increase Lantus slightly to 50 units once daily at 1200.  Continue with high sliding scale insulin novolog Q 4 hours.      He  tolerated being off sedation most of the day yesterday, continues with loose dark brown stools.      May need LTACH for ongoing vent weaning, plan to reevaluate around 11/24.    Current nutritional intake and type: None      Planned Procedures/surgeries: none  Steroid planning: prednisolone 12.5 mg PO BID, next taper on 11/21      D5W-containing solutions/medications: medications as suspensions that likely contain glucose    PTA Diabetes Regimen:   Lantus 5 units daily  Novolog 1:15g CHO with meals  novolog 3-11 units with meals and bedtime, ?sliding scale insulin   BG monitor: One Touch Verio  Frequency of checks: unknown    Discharge Planning: possible LTACH, date TBD           Diabetes History:   Type of Diabetes: Steroid Induced Diabetes Mellitus, stress hyperglycemia  Lab Results   Component Value Date    A1C 5.3 11/15/2021    A1C 6.6 09/07/2021    A1C 6.5 09/05/2021              Review of Systems:     The Review of Systems is negative other than noted in the Interval History.           Medications:     Current Facility-Administered Medications   Medication     acetaminophen (TYLENOL) tablet 650 mg     acetaminophen (TYLENOL) tablet 975 mg     acetylcysteine (MUCOMYST) 20 % nebulizer solution 2 mL     amiodarone (PACERONE) tablet 200 mg     banatrol plus packet 1 packet     bisacodyl (DULCOLAX) Suppository 10 mg     calcium carbonate 600 mg-vitamin D 400 units (CALTRATE) per tablet 1 tablet     dextrose 10% infusion     glucose gel 15-30 g    Or     dextrose 50 % injection 25-50 mL    Or     glucagon injection 1 mg     diphenhydrAMINE (BENADRYL) capsule 50 mg    Or     diphenhydrAMINE (BENADRYL) injection 50 mg     diphenhydrAMINE (BENADRYL) capsule 50 mg    Or     diphenhydrAMINE (BENADRYL) injection 50 mg     diphenhydrAMINE (BENADRYL) injection 50 mg     EPINEPHrine (ADRENALIN) kit 0.3 mg     escitalopram (LEXAPRO) tablet 5 mg     famotidine (PEPCID) infusion 20 mg     fentaNYL (SUBLIMAZE) infusion      fluconazole (DIFLUCAN) intermittent infusion 400 mg in NaCl     furosemide (LASIX) injection 40 mg     heparin infusion 25,000 units in D5W 250 mL ANTICOAGULANT     heparin lock flush 10 UNIT/ML injection 5-20 mL     heparin lock flush 10 UNIT/ML injection 5-20 mL     hydrALAZINE (APRESOLINE) injection 20 mg     hydrocortisone sodium succinate PF (solu-CORTEF) injection 100 mg     hydrOXYzine (ATARAX) tablet 25 mg    Or     hydrOXYzine (ATARAX) tablet 50 mg     immune globulin - sucrose free 10 % injection 30 g     insulin aspart (NovoLOG) injection (RAPID ACTING)     insulin glargine (LANTUS PEN) injection 45 Units     labetalol (NORMODYNE/TRANDATE) injection 20 mg     levalbuterol (XOPENEX) neb solution 1.25 mg     levothyroxine (SYNTHROID/LEVOTHROID) tablet 25 mcg     Lidocaine (LIDOCARE) 4 % Patch 2 patch     lidocaine (XYLOCAINE) 2 % solution 5 mL     lidocaine patch in PLACE     magnesium hydroxide (MILK OF MAGNESIA) suspension 30 mL     melatonin tablet 10 mg     methylPREDNISolone sodium succinate (solu-MEDROL) injection 125 mg     metoprolol tartrate (LOPRESSOR) tablet 25 mg     multivitamins w/minerals liquid 15 mL     mycophenolate (CELLCEPT BRAND) suspension 1,000 mg     naloxone (NARCAN) injection 0.2 mg    Or     naloxone (NARCAN) injection 0.4 mg    Or     naloxone (NARCAN) injection 0.2 mg    Or     naloxone (NARCAN) injection 0.4 mg     nystatin (MYCOSTATIN) suspension 1,000,000 Units     ondansetron (ZOFRAN-ODT) ODT tab 4 mg    Or     ondansetron (ZOFRAN) injection 4 mg     oxyCODONE (ROXICODONE) tablet 5 mg     oxymetazoline (AFRIN) 0.05 % spray 2 spray     pantoprazole (PROTONIX) 2 mg/mL suspension 40 mg     piperacillin-tazobactam (ZOSYN) 4.5 g vial to attach to  mL bag     potassium chloride (KAYCIEL) solution 40 mEq     prednisoLONE (ORAPRED) 15 MG/5 ML solution 12.5 mg     [START ON 11/21/2021] predniSONE (DELTASONE) tablet 10 mg     [START ON 11/21/2021] predniSONE (DELTASONE) tablet  "12.5 mg     prochlorperazine (COMPAZINE) injection 10 mg    Or     prochlorperazine (COMPAZINE) tablet 10 mg     propofol (DIPRIVAN) infusion     protein modular (PROSOURCE TF) 1 packet     QUEtiapine (SEROquel) tablet 25 mg     QUEtiapine (SEROquel) tablet 25 mg     rosuvastatin (CRESTOR) tablet 10 mg     senna-docusate (SENOKOT-S/PERICOLACE) 8.6-50 MG per tablet 1 tablet     sodium chloride (PF) 0.9% PF flush 10-20 mL     sodium chloride (PF) 0.9% PF flush 10-40 mL     sodium chloride (PF) 0.9% PF flush 3 mL     sodium chloride (PF) 0.9% PF flush 3 mL     sulfamethoxazole-trimethoprim (BACTRIM/SEPTRA) suspension 80 mg    Or     sulfamethoxazole-trimethoprim (BACTRIM) 400-80 MG per tablet 1 tablet     tacrolimus (GENERIC EQUIVALENT) suspension 2 mg     tacrolimus (GENERIC EQUIVALENT) suspension 2 mg     Warfarin Therapy Reminder (Check START DATE - warfarin may be starting in the FUTURE)            Physical Exam:    BP (!) 142/85   Pulse 80   Temp 98.1  F (36.7  C) (Oral)   Resp 14   Ht 1.626 m (5' 4\")   Wt 63.6 kg (140 lb 3.4 oz)   SpO2 98%   BMI 24.07 kg/m    General: Asleep. Sitting up in chair.  HEENT: normocephalic, atraumatic. Oral mucous membranes moist.   Lungs: unlabored respiration, no cough, +trach  ABD: rounded, nondistended  Skin: warm and dry, no obvious lesions  MSK:  moves all extremities  Mental status:  asleep, unable to assess orientation  Psych:   calm            Data:     Recent Labs   Lab 11/18/21  0341 11/18/21  0338 11/17/21  2348 11/17/21 2010 11/17/21  1543 11/17/21  1542   * 168* 182* 155* 200* 174*     Lab Results   Component Value Date    WBC 13.1 (H) 11/18/2021    WBC 13.7 (H) 11/17/2021    WBC 14.3 (H) 11/16/2021    HGB 8.0 (L) 11/18/2021    HGB 8.6 (L) 11/17/2021    HGB 9.5 (L) 11/16/2021    HCT 25.5 (L) 11/18/2021    HCT 27.6 (L) 11/17/2021    HCT 30.5 (L) 11/16/2021    MCV 99 11/18/2021    MCV 99 11/17/2021    MCV 99 11/16/2021     (L) 11/18/2021     " (L) 11/17/2021     (L) 11/16/2021     Lab Results   Component Value Date     11/18/2021     (H) 11/17/2021     11/17/2021    POTASSIUM 3.8 11/18/2021    POTASSIUM 3.4 11/17/2021    POTASSIUM 4.2 11/17/2021    CHLORIDE 106 11/18/2021    CHLORIDE 107 11/17/2021    CHLORIDE 103 11/17/2021    CO2 28 11/18/2021    CO2 27 11/17/2021    CO2 30 11/17/2021     (H) 11/18/2021     (H) 11/18/2021     (H) 11/17/2021     Lab Results   Component Value Date    BUN 48 (H) 11/18/2021    BUN 40 (H) 11/17/2021    BUN 45 (H) 11/17/2021     Lab Results   Component Value Date    TSH 7.90 (H) 10/29/2021    TSH 0.11 (L) 09/07/2021    TSH 0.19 (L) 09/06/2021     Lab Results   Component Value Date    AST 14 11/18/2021    AST 15 11/17/2021    AST 17 11/16/2021    ALT 28 11/18/2021    ALT 30 11/17/2021    ALT 35 11/16/2021    ALKPHOS 123 11/18/2021    ALKPHOS 150 11/17/2021    ALKPHOS 177 (H) 11/16/2021           35 minutes spent on the date of the encounter doing chart review, history and exam, documentation and further activities per the note      Over 50% of my time on the unit was spent counseling the patient and/or coordinating care regarding acute hyperglycemia management.  See note for details.    To contact Endocrine Diabetes service:   From 8AM-4PM: page inpatient diabetes provider that is following the patient  For questions or updates from 4PM-8AM: page the diabetes job code for on call fellow: [phone]    ILIR Salomon, CNP  Inpatient Diabetes Management Service  Pager [phone]

## 2021-11-18 NOTE — PROGRESS NOTES
Major Shift Events: Pt pressure supported for several hours today. Tolerated being off of sedation until this afternoon, when he became very anxious and tachypneic. Restarted on propofol gtt. Continues to have liquid stools. Tolerated working with therapy well.   Plan: Mobilize. Pressure support as tolerated. Work towards [facility].   For vital signs and complete assessments, please see documentation flowsheets.

## 2021-11-18 NOTE — PROGRESS NOTES
"THORACIC SURGERY PROGRESS NOTE    S:  NAEO. Resting in bed. Tube feeds running without clog or issue. Tracheostomy in place without concern from nursing or patient.     O:  /75   Pulse 83   Temp 98.1  F (36.7  C) (Oral)   Resp 15   Ht 1.626 m (5' 4\")   Wt 63.6 kg (140 lb 3.4 oz)   SpO2 96%   BMI 24.07 kg/m      I/O last 3 completed shifts:  In: 2796.63 [I.V.:1146.63; NG/GT:570]  Out: 2915 [Urine:1795; Emesis/NG output:150; Stool:900; Chest Tube:70]    Alert  Nonlabored breathing on trach/vent  RRR  S NT ND, PEG in place 2.5cm at skin, Fastener sites in place  Distal extremities warm.     Labs       Lab Results   Component Value Date     11/18/2021    Lab Results   Component Value Date    CHLORIDE 106 11/18/2021    Lab Results   Component Value Date    BUN 48 11/18/2021      Lab Results   Component Value Date    POTASSIUM 3.8 11/18/2021    POTASSIUM 4.8 11/02/2021    Lab Results   Component Value Date    CO2 28 11/18/2021    Lab Results   Component Value Date    CR 0.67 11/18/2021        Lab Results   Component Value Date    WBC 13.1 (H) 11/18/2021    HGB 8.0 (L) 11/18/2021    HCT 25.5 (L) 11/18/2021    MCV 99 11/18/2021     (L) 11/18/2021       Imaging  Recent Results (from the past 24 hour(s))   XR Chest Port 1 View    Narrative    Exam: XR CHEST PORT 1 VIEW, 11/18/2021 1:07 AM    Comparison: Chest x-ray 11/17/2021    History: s/p lung transplant    Findings:  A single portable AP semiupright view of the chest is obtained.  Postsurgical changes of bilateral lung transplantation, clamshell  sternotomy wires are intact. Tracheostomy tube tip is in the mid  thoracic trachea. Left upper extremity PICC tip terminates within the  mid superior vena cava. Left basilar chest tube in place.    Trachea is midline. Mediastinum is within normal limits.  Cardiopulmonary silhouette is within normal limits. Slight increase in  left basilar pleural effusion and associated atelectasis. Unchanged  right " "basilar atelectasis. No appreciable pneumothorax. The upper  abdomen is unremarkable.      Impression    Impression:   1. Slight increase in left basilar pleural effusion and associated  atelectasis.  2. Unchanged right basilar atelectasis.    I have personally reviewed the examination and initial interpretation  and I agree with the findings.    ZHANNA DONG MD         SYSTEM ID:  [id number]   XR Chest Port 1 View    Impression    RESIDENT PRELIMINARY INTERPRETATION  IMPRESSION:   1. Stable small bilateral pleural effusions.  2. Grossly unchanged bibasilar atelectasis.        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    - no apparent issues with trach or pegj  - thoracic will be available, please call with questions    Eduardo Alford MD  Surgery Resident PGY-1  Please see AM[facility] \"Surgery Thoracic/[facility]\" for paging  "

## 2021-11-18 NOTE — PROGRESS NOTES
Major Shift Events:  NAEON. Pt rested on the vent, CMV 40% 12/460/6; tolerated well. Denies pain, LUCIO. TF @ goal through PEG, g to straight drain. Continues to have loose dark brown stools. UOP adequate, 30-40ml/hr.    Plan: Continue with current plan of care.    For vital signs and complete assessments, please see documentation flowsheets.

## 2021-11-18 NOTE — PROGRESS NOTES
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 Corral. 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  UGIB causing code blue on 11/2.     OVERNIGHT EVENTS:  -NAEO    TODAY'S PROGRESS:   - wean fentanyl & prop as tolerated    PLAN:  Neuro/ pain/ sedation:  Acute postoperative pain   Anxiety and depression  Acute to subacute CVA, b/l cerebral hemispheres and L cerebellum, suspect embolic  Encephalopathy and diffuse weakness - improving slightly   R ear lateral canal floor excoriation with bleeding - resolved   - Tylenol and oxycodone prn, lido patch    - wean propofol & fentanyl as tolerated  - PTA lexapro  - Propofol gtt  - Seroquel 25 BID               -PRN Seroquel    -Avoid Propofol at night if able    Pulmonary care:   SALTY on home CPAP  Acute hypoxic respiratory failure s/p b/l lung transplant 10/16/21  S/p tracheostomy 10/29  Empyema with Candida s/p chest tube 11/3/21  - mechanically ventilated via trach, PST, trach dome               - CMV overnight    - Pressure supported all day yesterday  - Trach dome today  - Levalbuterol nebs scheduled and Mucomyst, chest PT  - Daily CXR  - appreciate Pulmonary               -IGG 17 NOV  - Pleural and blood fungal cultures to assess chest tube and fluconazole course    Cardiovascular:    HTN  Afib   PFO  Vasoplegic shock in setting of hypovolemia - resolved  - Monitor hemodynamic status.   - MAP goal <100, SBP <140  - metoprolol 75 BID, held due to bradycardia    - Continue low dose  25 BID  - prn labetalol  - rosuvastatin 10 mg  - Amiodarone 200 mg daily (end date 11/27/21)  - low-intensity heparin gtt    GI care:   Elevated LFTs - recurrent  GI bleed 2/2 NG trauma, now recurrent secondary to GJ bumper ulcer   Moderate malnutrition in the context of acute on chronic illness  PEG-J, with malpositioned J tube   - Diet: TF at goal  - PPI BID  - Continued diarrhea    Fluids/ Electrolytes/ Nutrition:   Hypernatremia  Hyperkalemia, resolved   BL creat appears to be ~ 0.7  - free water flushes  - Goal 0 to -50   - Continue Lasix to 40 BID  - Strict I/O, daily weights  - Avoid/limit nephrotoxins as able  - Replete lytes PRN per protocol    Endocrine:    Stress induced hyperglycemia with steroid-induced component, on DM2  Hypothyroidism  RA  Preop A1c 6.6  - Discontinue insulin gtt. High sliding scale insulin    - Goal BG <180 for optimal healing  - continue home synthroid  - Endocrine consulted, appreciate care    ID/ Antibiotics:  Immunosuppression s/p transplant  Candidal infection of donor lungs (likely source), pleural fluid, and fungemia   PNA  - NGTD CSF. Last + blood cx 10/22. No vegetations on valves per TC 10/23  - Nystatin, Acyclovir, bactrim   - Tacrolimus (via g tube), prednisone    - appreciate transplant ID, ophthalmology    - d/w ID, no indication to treat Staph epi in sputum   - Fluconazole for fungemia (re-evaluate on 11/24). Repeat cultures sent 11/18   - Zosyn for klebsiella and Pseudomonas for two weeks for coverage    Heme:     Acute blood loss anemia secondary to surgery and GI bleed, and anemia related to critical illness and iron deficiency    Hypogammaglobulinemia s/p IVIG  Thrombocytopenia, HIT negative - resolved   Lactic acidosis, resolved  Nonocclusive R subclavian thrombus    - heparin low intensity  - Start cumadin 16 NOV     Prophylaxis:    - Mechanical prophylaxis for DVT: SCDs  - Chemical DVT prophylaxis: heparin low intensity gtt, start warfarin today  -  PPI    Lines/ tubes/ drains:  - trach  - peg-j  - Watson 10/25; replaced 11/14  - PIVs  - chest tube L  - L PICC    Disposition:  - CV ICU    Patient seen, findings and plan discussed with CV ICU staff        Tammie Perez MD (PGY-3)  General Surgery    ====================================    SUBJECTIVE:   NAEO, resting comfortably in bed. Improved sleep yesterday. Less anxious.    OBJECTIVE:   1. VITAL SIGNS:   Temp:  [97.7  F (36.5  C)-99.2  F (37.3  C)] 98.1  F (36.7  C)  Pulse:  [64-98] 89  Resp:  [13-18] 14  BP: ()/(59-92) 134/85  FiO2 (%):  [40 %] 40 %  SpO2:  [95 %-100 %] 99 %  Ventilation Mode: (S) CPAP/PS  (Continuous positive airway pressure with Pressure Support)  FiO2 (%): 40 %  Rate Set (breaths/minute): 12 breaths/min  Tidal Volume Set (mL): 460 mL  PEEP (cm H2O): 6 cmH2O  Pressure Support (cm H2O): (S) 10 cmH2O  Oxygen Concentration (%): 40 %  Resp: 14      2. INTAKE/ OUTPUT:   I/O last 3 completed shifts:  In: 2796.63 [I.V.:1146.63; NG/GT:570]  Out: 2915 [Urine:1795; Emesis/NG output:150; Stool:900; Chest Tube:70]    3. PHYSICAL EXAMINATION:   General: Alert individual sitting in bed,  Neuro: Alert and Oriented, resting comfortably   Resp: Trach present, breathing comfortably  CV: Intermittent Sinus Tachycardia  Abdomen: Soft, Non-distended, Non-tender  Incisions: c/d/i  Extremities: warm and well perfused    4. INVESTIGATIONS:   Arterial Blood Gases   Recent Labs   Lab 11/11/21  1740   PH 7.48*   PCO2 37   PO2 106*   HCO3 28     Complete Blood Count   Recent Labs   Lab 11/18/21  0338 11/17/21  0337 11/16/21  0357 11/15/21  0344   WBC 13.1* 13.7* 14.3* 11.2*   HGB 8.0* 8.6* 9.5* 9.7*   * 132* 141* 160     Basic Metabolic Panel  Recent Labs   Lab 11/18/21  0341 11/18/21  0338 11/17/21  2348 11/17/21 2010 11/17/21  1543 11/17/21  0743 11/17/21  0337 11/16/21  1934 11/16/21  1600   NA  --  142  --   --  148*  --  140  --  143   POTASSIUM  --  3.8  --   --  3.4  --  4.2  --  3.4   CHLORIDE   --  106  --   --  107  --  103  --  107   CO2  --  28  --   --  27  --  30  --  24   BUN  --  48*  --   --  40*  --  45*  --  41*   CR  --  0.67  --   --  0.68  --  0.77  --  0.72   * 168* 182* 155* 200*   < > 210*   < > 234*  204*    < > = values in this interval not displayed.     Liver Function Tests  Recent Labs   Lab 11/18/21 0338 11/17/21 0337 11/16/21 0357 11/15/21  0344   AST 14 15 17 16   ALT 28 30 35 33   ALKPHOS 123 150 177* 150   BILITOTAL 0.2 0.2 0.2 0.2   ALBUMIN 1.8* 2.1* 2.1* 2.1*   INR 1.40* 1.23* 1.18* 1.15     Pancreatic Enzymes  No lab results found in last 7 days.  Coagulation Profile  Recent Labs   Lab 11/18/21 0338 11/17/21 0337 11/16/21 0357 11/15/21  0344   INR 1.40* 1.23* 1.18* 1.15         5. RADIOLOGY:   Recent Results (from the past 24 hour(s))   XR Chest Port 1 View    Narrative    Exam: XR CHEST PORT 1 VIEW, 11/18/2021 1:07 AM    Comparison: Chest x-ray 11/17/2021    History: s/p lung transplant    Findings:  A single portable AP semiupright view of the chest is obtained.  Postsurgical changes of bilateral lung transplantation, clamshell  sternotomy wires are intact. Tracheostomy tube tip is in the mid  thoracic trachea. Left upper extremity PICC tip terminates within the  mid superior vena cava. Left basilar chest tube in place.    Trachea is midline. Mediastinum is within normal limits.  Cardiopulmonary silhouette is within normal limits. Slight increase in  left basilar pleural effusion and associated atelectasis. Unchanged  right basilar atelectasis. No appreciable pneumothorax. The upper  abdomen is unremarkable.      Impression    Impression:   1. Slight increase in left basilar pleural effusion and associated  atelectasis.  2. Unchanged right basilar atelectasis.    I have personally reviewed the examination and initial interpretation  and I agree with the findings.    ZHANNA DONG MD         SYSTEM ID:  [id number]        =========================================

## 2021-11-18 NOTE — PROGRESS NOTES
Pulmonary Medicine  Cystic Fibrosis - Lung Transplant Team  Progress Note  2021       Patient: Edson Thornton  MRN: [id number]  : 1965 (age 56 year old)  Transplant: 10/16/2021 (Lung), POD#33  Admission date: 2021    Assessment & Plan:     Edson Thornton is a 56 year old male with a PMH significant for NSIP/ILD, bronchiectasis, moderate PH, RA, SALTY, chronic HSV infection, hypogammaglobulinemia, steroid-induced diabetes, hypothyroidism, PFO, HTN, HLD, duodenal anomaly, anxiety, and depression.  Admitted on 21 from OSH for acute on chronic respiratory failure 2/2 ILD exacerbation, now s/p BSLT on 10/16/21.  Prolonged vent wean s/p trach and PEG/J tube placement with thoracic surgery 10/29.  Post-op course otherwise complicated by encephalopathy and diffuse weakness, acute to subacute CVA, afib with RVR, BRENNAN, GI bleed, Candidemia/Candida empyema, and anxiety.  Code called  for bradycardia/asystole, progressive hypotensive, found to have significant GI bleed, EGD with adherent clot near PEG tube site that was clipped.  Intermittently tolerating PS/TD trials and with ongoing improvement in mentation and weakness.      Today's recommendations:    - TD/PS trials during the day per ICU team otherwise CMV between and overnight  - Diuresis per ICU team  - CXR daily while left chest tube in place  - Tacrolimus repeat level ordered   - Prednisone taper ordered   - Coccidioides Ag,  pending  - Continue Zosyn for 2 week course through   - Repeat pleural fungal cultures and fungal blood cultures   - Continue fluconazole (reevaluate around , repeat fungal cultures pending), EKG weekly for QTc monitoring (, ordered)  - Will need repeat imaging to ensure left pleural effusion almost entirely resolved before removing chest tube and concluding fluconazole, reevaluate around   - Repeat IgG () low at 198. IVIG (with premedication)   ordered  - DSA monitoring q2 weeks, (11/29) ordered  - May need LTACH for ongoing vent weaning (reevaluate around 11/24)      S/p BSLT for ILD:  Acute hypoxic respiratory failure s/p trach:   Bilateral pleural effusions: Unfortunately had not received vaccination for flu, PNA, or COVID-19 PTA.  Explant pathology with NSIP, no malignancy.  PGD 2-3.  Weaned off paralytic 10/19 (for vent dyssynchrony) and Caroline 10/22.  Initial difficulty weaning sedation given agitation then with neurological findings as below.  Prolonged vent wean s/p trach and PEG/J tube placement with thoracic surgery 10/29.  Code called 11/2 for bradycardia/asystole (required 1 round of CPR, no medications) then progressively hypotensive, GI bleed as below.  Chest CT 11/2 with increased bilateral pleural effusions (moderate left, small right), bibasilar atelectasis with area of hypoenhancing parenchyma in LLL (suspicious for infection), and numerous nondisplaced anterior rib fractures bilaterally.  S/p left chest tube placement in IR 11/3 for pleural effusion/empyema (as below), right deferred given very little effusion on US.  Problems with trach cuff leak 11/3 (requiring multiple exchanges), bronch 11/14 with trach in good position with cuff well sealed in trachea and small to moderate secretions mostly in lower lobes.  Intermittently tolerating PS trials, hypoxia slowly improving.  - Nebs: levalbuterol and Mucomyst QID  - Aggressive pulmonary toilet with chest physiotherapy QID  - TD/PS trials during the day per ICU team otherwise CMV between and overnight  - Diuresis per ICU team  - CXR daily while left chest tube in place, today with stable bibasilar opacities and pleural effusions (personally reviewed with Dr. Wells)  - Repeat bronch not indicated today, revisit prn  - May need LTACH for ongoing vent weaning (reevaluate around 11/24)      Immunosuppression: s/p induction therapy with basiliximab 10/16 (and high dose IV steroid) and 10/20  -  Tacrolimus 2 mg BID (suspension, via G tube).  Goal level 8-12.  Repeat level 11/19 (ordered).  - MMF 1000 mg BID (11/2, decreased given GI bleed, AZA to be avoided given TPMT)  - Prednisolone 12.5 mg BID, next taper due 11/21 (ordered)  Date AM dose (mg) PM dose (mg)   11/7/21 12.5 12.5   11/21/21 12.5 10   12/5/21 10 10   1/2/22 10 7.5   1/30/22 7.5 7.5   2/27/22 7.5 5   3/27/22 5 5   4/24/22 5 2.5      Prophylaxis:   - Bactrim for PJP ppx (held 11/2-11/5 d/t BRENNAN)  - Nystatin for oral candidiasis ppx, 6 month course  - See below for serologies and viral ppx:    Donor Recipient Intervention   CMV status Negative Negative None, CMV monthly (due 12/14, neg 11/16)   EBV status Positive Positive None, EBV monthly (due 12/14, neg 11/16)   HSV status N/A Positive S/p acyclovir POD #1-30 (recent infection history pre-txp)      ID: Concern for possible Strongyloides exposure pre-transplant s/p ivermectin x1 dose (9/17).  Donor and recipient cultures NGTD.  S/p IV ceftazidime/vancomycin for 48h per protocol and additional empiric ceftazidime 10/19-10/23 given recurrent fevers as below.  Cryptococcal Ag negative 10/23.  - Monthly Coccidioides Ag x12 months post-transplant per ID (urine and serum negative 10/17), 11/17 pending     Klebsiella pneumoniae:  Pseudomonas fluorescens/putida: Klebsiella initially noted trach sputum culture 11/10 (R-ampicillin, ciprofloxacin; I-ampicillin/sulbactam, levofloxacin).  Started on ceftriaxone 11/11, transitioned to ertapenem 11/12-11/13, and back to ceftriaxone 11/14.  Bronch culture 11/12 with Klebsiella and Pseudomonas fluorescens/putida (R-meropenem).     - ABX: Zosyn (11/15) for 2 week course through 11/29     Recurring fevers, Resolved:  Fevers post-op, Tmax 101.7 POD #1.  Febrile with worsening leukocytosis again 10/25, generally persisting.  LP (10/29), xanthochromic with pleocytosis thought to be appropriate given RBC and WBCs, no ABX recommended per transplant ID and  neurology.  S/p empiric meropenem 10/28-11/2.  Now afebrile, ABX as above.     Disseminated Candida: Noted on blood cultures 10/20 and 10/22.  BDG fungitell positive (399) on 10/20.  Respiratory cultures with persistent Candida albicans.  TC 10/23 without evidence of endocarditis.  Ophthalmology consult 10/24 with benign dilated fundoscopic exam.  Candida empyema also noted 10/25, chest tubes inadvertently removed by CVTS 10/28, left chest tube replaced by IR 11/3 as above with ongoing Candida on cultures.    - Repeat pleural fungal cultures and fungal blood cultures 11/18  - Fluconazole (10/26 per transplant ID, prior micafungin 10/22-10/27), repeat EKG for QTc monitoring 11/23 (ordered), LFTs as below (see transplant ID note 11/5, will need repeat imaging to ensure left pleural effusion almost entirely resolved before removing chest tube and concluding fluconazole, reevaluate around 11/24)      HSV: Chronic intermittent active infection pre-transplant with recent HSV infection: crusted lesions throughout left side of jaw, s/p 10 day treatment course of ACV through 10/9.  HSV PCR blood negative 10/17.  S/p ACV ppx as above (started POD #1 instead of POD #8 given HSV history and location).     Hypogammaglobulinemia: IgG previously low at 364 (9/7).  Noted at 265 at time of transplant, s/p IVIG with premedication 10/21.    - Repeat IgG (11/17) low at 198. Repeat IVIG (with premedication) 11/18 ordered     Positive cross match: Note that he received two doses of rituximab in June, which is likely contributing to cross match result.  DSA most recently negative 11/15.  - DSA monitoring q2 weeks, (11/29) ordered     SALTY: Noted pre-transplant.  Home CPAP 6-12 cm H2O.  - Resume BiPAP after decannulation.     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  MRI 10/23 with multifocal subacute infarcts within both cerebral hemispheres and left cerebellum.  DDx include surgery v embolic v infectious.  Heparin drip started 10/23 (intermittently held with GI bleed as below).  Repeat stroke code 10/25 d/t marked decrease in responsiveness with sedation wean, pupil inequality, and absent gag reflex.  CTA head without obvious new pathology, MRI brain (with and without contrast) primarily revealing for infarct, low likelihood of PRES.  Ammonia normal.  VEEG per neuro with severe diffuse encephalopathy.  Gradual improvement noted since 10/29, repeat head CT 11/2 (following code) without new acute intracranial abnormalities.  - AC management per primary team as below  - PT/OT     Afib with RVR: Noted 10/18, started on amiodarone drip and converted to SR, Transitioned to PO 10/21, dose decreased 10/29 (anticipate 4 week course). AC as above.  Metoprolol resumed 11/3.     Right subclavian DVT: Seen on UE US from 11/4.  Heparin drip resumed 11/5.        Recurrent GI bleed, Resolved: Hemoglobin dropped to 6.6 10/22, s/p 2 units pRBC.  OG tube with bloody output, EGD 10/23 noted NJ/OG tube trauma with scant oozing.  Progressive hypotension 11/2, hemoglobin 5.8 with bloody G tube output.  Heparin drip held, transitioned to PPI drip, and MTP activated.  EGD 11/2 with large amount of clotted blood in stomach and area of raised mucosa with small adherent clot near PEG tube site that was clipped, no active bleeding.  Maroon stools 11/3, repeat EGD with extensive old blood in stomach, no active bleeding, small nodular area with prior clips clipped again.  Most recent EGD 11/5 with ulcer noted at PEG tube bumper site, gastritis, and suction marks from G tube. TF noted in G tube drainage bag 11/7, AXR with GJ tube tip projecting over proximal duodenum. GJ tube exchanged 11/9 by  "GI.      Elevated LFTs: Shock liver post-op.  Initial ALT//230 evening of surgery, then with marked increase to 1550/1246 POD #1.  Intermittent alk phos elevation since.     We appreciate the excellent care provided by the CVICU and CVTS teams.  Recommendations communicated via in person rounding and this note.  Will continue to follow along closely, please do not hesitate to call with any questions or concerns.     Patient seen and discussed with Dr. Wells.     Betina Jeffrey, APRN, CNP   Inpatient Nurse Practitioner  Pulmonary CF/Transplant  Pager #[phone]     Subjective & Interval History:     PS yesterday at 10/8 50% for most of the morning. Otherwise on CMV 12/460/8/50. Improved anxiety/aggitation on Seroquel, propofol was weaned yesterday morning, but resumed in the afternoon d/t anxiety and tachypnea.  On PS this morning at 10/6 40%. CPT x4 yesterday. Up to chair with PT via lift, stood with moveo. Left chest tube remains, low output. Net -161mL with diuresis yesterday. Low grade temp 99.2, improving leukocytosis. Denies pain or nausea. Breathing comfortably with infrequent cough and small sputum production. Loose stools via rectal tube.    Review of Systems:     ROS as above otherwise limited due to communication barriers    Physical Exam:     Vital signs:  Temp: 98.1  F (36.7  C) Temp src: Oral BP: 121/75 Pulse: 83   Resp: 15 SpO2: 96 % O2 Device: Mechanical Ventilator Oxygen Delivery: 45 LPM Height: 162.6 cm (5' 4\") Weight: 63.6 kg (140 lb 3.4 oz)  I/O:     Intake/Output Summary (Last 24 hours) at 11/18/2021 1223  Last data filed at 11/18/2021 1100  Gross per 24 hour   Intake 2926.95 ml   Output 2576 ml   Net 350.95 ml     Constitutional: lying in bed, in no apparent distress.   HEENT: Eyes with pink conjunctivae, anicteric.  Oral mucosa moist without lesions.    PULM: Fair air flow bilaterally.  Scattered crackles greater bibasilar, no rhonchi, no wheezes.  Non-labored breathing on PS.  CV: " Normal S1 and S2.  RRR.  No murmur, gallop, or rub.  No peripheral edema.   ABD: NABS, soft, non tender, nondistended.    MSK:  Able to squeeze left > right hand and move BLE.  + apparent muscle wasting.   NEURO: Alert, follows commands, nonverbal communication by head nod/shake  SKIN: Warm, dry.  No rash on limited exam.   PSYCH: Mood stable.     Lines, Drains, and Devices:  Peripheral IV 11/02/21 Anterior;Right Upper forearm (Active)   Site Assessment WDL 11/18/21 0400   Line Status Infusing;Checked every 1-2 hour 11/18/21 0400   Dressing Intervention Dressing reinforced 11/08/21 2000   Phlebitis Scale 0-->no symptoms 11/18/21 0400   Infiltration Scale 0 11/18/21 0400   Infiltration Site Treatment Method  None 11/10/21 0400   Number of days: 16       PICC Triple Lumen 11/04/21 Left Basilic Access. PICC okay to use. (Active)   Site Assessment [facility] 11/18/21 0400   External Cath Length (cm) 1 cm 11/04/21 1747   Extremity Circumference (cm) 28 cm 11/04/21 1747   Dressing Intervention Chlorhexidine patch;Transparent 11/18/21 0400   Dressing Change Due 11/21/21 11/17/21 2000   [place] - Status saline locked;blood return noted 11/18/21 0400   Gray - Cap Change Due 11/19/21 11/17/21 2000   Red - Status infusing 11/18/21 0400   Red - Cap Change Due 11/19/21 11/17/21 2000   White - Status infusing 11/18/21 0400   White - Cap Change Due 11/19/21 11/17/21 2000   Extravasation? No 11/17/21 2000   Line Necessity Yes, meets criteria 11/18/21 0400   Number of days: 14     Data:     LABS    CMP:   Recent Labs   Lab 11/18/21  1209 11/18/21  1011 11/18/21  0341 11/18/21  0338 11/17/21  2010 11/17/21  1543 11/17/21  0743 11/17/21  0337 11/16/21  1934 11/16/21  1600 11/16/21  0855 11/16/21  0357 11/15/21  0749 11/15/21  0344   NA  --   --   --  142  --  148*  --  140  --  143  --  139  139   < > 140  140   POTASSIUM  --   --   --  3.8  --  3.4  --  4.2  --  3.4  --  4.0  4.0   < > 4.6  4.6   CHLORIDE  --   --   --  106  --  107  --   103  --  107  --  102  102   < > 106  106   CO2  --   --   --  28  --  27  --  30  --  24  --  28  28   < > 31  31   ANIONGAP  --   --   --  8  --  14  --  7  --  12  --  9  9   < > 3  3   * 146* 154* 168*   < > 200*   < > 210*   < > 234*  204*   < > 216*  216*   < > 191*  191*   BUN  --   --   --  48*  --  40*  --  45*  --  41*  --  44*  44*   < > 37*  37*   CR  --   --   --  0.67  --  0.68  --  0.77  --  0.72  --  0.62*  0.62*   < > 0.47*  0.47*   GFRESTIMATED  --   --   --  >90  --  >90  --  >90  --  >90  --  >90  >90   < > >90  >90   ERIK  --   --   --  7.8*  --  7.8*  --  8.7  --  7.4*  --  9.1  9.1   < > 8.7  8.7   MAG  --   --   --  2.2  --  1.9  --  1.8  --   --   --  1.7  --  1.5*   PHOS  --   --   --  3.5  --   --   --  4.2  --   --   --  5.4*  --  3.6   PROTTOTAL  --   --   --  4.9*  --   --   --  5.3*  --   --   --  5.4*  --  5.4*   ALBUMIN  --   --   --  1.8*  --   --   --  2.1*  --   --   --  2.1*  --  2.1*   BILITOTAL  --   --   --  0.2  --   --   --  0.2  --   --   --  0.2  --  0.2   ALKPHOS  --   --   --  123  --   --   --  150  --   --   --  177*  --  150   AST  --   --   --  14  --   --   --  15  --   --   --  17  --  16   ALT  --   --   --  28  --   --   --  30  --   --   --  35  --  33    < > = values in this interval not displayed.     CBC:   Recent Labs   Lab 11/18/21  0338 11/17/21  0337 11/16/21  0357 11/15/21  0344   WBC 13.1* 13.7* 14.3* 11.2*   RBC 2.58* 2.79* 3.08* 3.14*   HGB 8.0* 8.6* 9.5* 9.7*   HCT 25.5* 27.6* 30.5* 30.8*   MCV 99 99 99 98   MCH 31.0 30.8 30.8 30.9   MCHC 31.4* 31.2* 31.1* 31.5   RDW 17.2* 17.1* 17.2* 17.2*   * 132* 141* 160       INR:   Recent Labs   Lab 11/18/21  0338 11/17/21 0337 11/16/21  0357 11/15/21  0344   INR 1.40* 1.23* 1.18* 1.15       Glucose:   Recent Labs   Lab 11/18/21  1209 11/18/21  1011 11/18/21  0341 11/18/21  0338 11/17/21  2348 11/17/21 2010   * 146* 154* 168* 182* 155*       Blood Gas:   Recent Labs   Lab  11/15/21  1012 11/14/21  0534 11/11/21  1740 11/11/21  1516   PHV 7.40 7.43  --  7.43   PCO2V 46 47  --  40   PO2V 40 38  --  34   HCO3V 29* 31*  --  26   LORI 3.3* 5.8*  --  1.8   O2PER 50 30 40 40       Culture Data No results for input(s): CULT in the last 168 hours.    Virology Data:   Lab Results   Component Value Date    FLUAH1 Negative 11/12/2021    FLUAH3 Negative 11/12/2021    IL4346 Negative 11/12/2021    IFLUB Negative 11/12/2021    RSVA Negative 11/12/2021    RSVB Negative 11/12/2021    PIV1 Negative 11/12/2021    PIV2 Negative 11/12/2021    PIV3 Negative 11/12/2021    HMPV Negative 11/12/2021    HRVS Negative 11/12/2021    ADVBE Negative 11/12/2021    ADVC Negative 11/12/2021    ADVC Negative 10/17/2021       Historical CMV results (last 3 of prior testing):  Lab Results   Component Value Date    CMVQNT Not Detected 11/16/2021    CMVQNT Not Detected 11/12/2021    CMVQNT Not Detected 10/26/2021     No results found for: CMVLOG    Urine Studies    Recent Labs   Lab Test 11/01/21  1336 10/25/21  1507   URINEPH 5.5 5.5   NITRITE Negative Negative   LEUKEST Negative Negative   WBCU 0 2       Most Recent [facility] Pulmonary Function Testing (FVC/FEV1 only)  No results found for: 20002  No results found for: 20003  No results found for: 20015  No results found for: 20016    IMAGING    Recent Results (from the past 48 hour(s))   XR Chest Port 1 View    Narrative    Exam: XR CHEST PORT 1 VIEW, 11/17/2021 1:05 AM    Comparison: Chest x-ray 11/16/2021    History: s/p lung transplant    Findings:  A single portable AP semiupright view of the chest is obtained.  Postoperative changes of bilateral lung transplantation, clamshell  sternotomy wires are intact. Tracheostomy tube tip is in the mid  thoracic trachea. Left upper extremity PICC tip terminates in the  superior cavoatrial junction. Unchanged position of left basilar chest  tube.    Trachea is midline. Mediastinum is within normal limits.  Cardiopulmonary  silhouette is within normal limits. Right reduction in  basilar opacities. Slightly reduced bilateral pleural effusions. No  pneumothorax. The upper abdomen is unremarkable.      Impression    Impression:   1. Slight reduction in bibasilar pleural effusions and associated  atelectasis.  2. Reduction in right basilar opacity.    I have personally reviewed the examination and initial interpretation  and I agree with the findings.    FITO PERALES MD         SYSTEM ID:  [id number]   XR Chest Port 1 View    Narrative    Exam: XR CHEST PORT 1 VIEW, 11/18/2021 1:07 AM    Comparison: Chest x-ray 11/17/2021    History: s/p lung transplant    Findings:  A single portable AP semiupright view of the chest is obtained.  Postsurgical changes of bilateral lung transplantation, clamshell  sternotomy wires are intact. Tracheostomy tube tip is in the mid  thoracic trachea. Left upper extremity PICC tip terminates within the  mid superior vena cava. Left basilar chest tube in place.    Trachea is midline. Mediastinum is within normal limits.  Cardiopulmonary silhouette is within normal limits. Slight increase in  left basilar pleural effusion and associated atelectasis. Unchanged  right basilar atelectasis. No appreciable pneumothorax. The upper  abdomen is unremarkable.      Impression    Impression:   1. Slight increase in left basilar pleural effusion and associated  atelectasis.  2. Unchanged right basilar atelectasis.    I have personally reviewed the examination and initial interpretation  and I agree with the findings.    ZHANNA DONG MD         SYSTEM ID:  [id number]   XR Chest Port 1 View    Impression    RESIDENT PRELIMINARY INTERPRETATION  IMPRESSION:   1. Stable small bilateral pleural effusions.  2. Grossly unchanged bibasilar atelectasis.

## 2021-11-19 ENCOUNTER — APPOINTMENT (OUTPATIENT)
Dept: PHYSICAL THERAPY | Facility: CLINIC | Age: 56
End: 2021-11-19
Attending: STUDENT IN AN ORGANIZED HEALTH CARE EDUCATION/TRAINING PROGRAM
Payer: COMMERCIAL

## 2021-11-19 ENCOUNTER — APPOINTMENT (OUTPATIENT)
Dept: GENERAL RADIOLOGY | Facility: CLINIC | Age: 56
End: 2021-11-19
Attending: STUDENT IN AN ORGANIZED HEALTH CARE EDUCATION/TRAINING PROGRAM
Payer: COMMERCIAL

## 2021-11-19 LAB
ALBUMIN SERPL-MCNC: 1.9 G/DL (ref 3.4–5)
ALP SERPL-CCNC: 123 U/L (ref 40–150)
ALT SERPL W P-5'-P-CCNC: 29 U/L (ref 0–70)
ANION GAP SERPL CALCULATED.3IONS-SCNC: 5 MMOL/L (ref 3–14)
ANION GAP SERPL CALCULATED.3IONS-SCNC: 6 MMOL/L (ref 3–14)
AST SERPL W P-5'-P-CCNC: 18 U/L (ref 0–45)
BASE EXCESS BLDV CALC-SCNC: 3.1 MMOL/L (ref -7.7–1.9)
BASE EXCESS BLDV CALC-SCNC: 9 MMOL/L (ref -7.7–1.9)
BILIRUB SERPL-MCNC: 0.2 MG/DL (ref 0.2–1.3)
BUN SERPL-MCNC: 41 MG/DL (ref 7–30)
BUN SERPL-MCNC: 47 MG/DL (ref 7–30)
CALCIUM SERPL-MCNC: 8.5 MG/DL (ref 8.5–10.1)
CALCIUM SERPL-MCNC: 8.6 MG/DL (ref 8.5–10.1)
CHLORIDE BLD-SCNC: 101 MMOL/L (ref 94–109)
CHLORIDE BLD-SCNC: 104 MMOL/L (ref 94–109)
CO2 SERPL-SCNC: 31 MMOL/L (ref 20–32)
CO2 SERPL-SCNC: 32 MMOL/L (ref 20–32)
CREAT SERPL-MCNC: 0.68 MG/DL (ref 0.66–1.25)
CREAT SERPL-MCNC: 0.72 MG/DL (ref 0.66–1.25)
ERYTHROCYTE [DISTWIDTH] IN BLOOD BY AUTOMATED COUNT: 16.7 % (ref 10–15)
GFR SERPL CREATININE-BSD FRML MDRD: >90 ML/MIN/1.73M2
GFR SERPL CREATININE-BSD FRML MDRD: >90 ML/MIN/1.73M2
GLUCOSE BLD-MCNC: 160 MG/DL (ref 70–99)
GLUCOSE BLD-MCNC: 174 MG/DL (ref 70–99)
GLUCOSE BLDC GLUCOMTR-MCNC: 133 MG/DL (ref 70–99)
GLUCOSE BLDC GLUCOMTR-MCNC: 147 MG/DL (ref 70–99)
GLUCOSE BLDC GLUCOMTR-MCNC: 148 MG/DL (ref 70–99)
GLUCOSE BLDC GLUCOMTR-MCNC: 149 MG/DL (ref 70–99)
GLUCOSE BLDC GLUCOMTR-MCNC: 168 MG/DL (ref 70–99)
GLUCOSE BLDC GLUCOMTR-MCNC: 172 MG/DL (ref 70–99)
GLUCOSE BLDC GLUCOMTR-MCNC: 183 MG/DL (ref 70–99)
HCO3 BLDV-SCNC: 29 MMOL/L (ref 21–28)
HCO3 BLDV-SCNC: 35 MMOL/L (ref 21–28)
HCT VFR BLD AUTO: 25 % (ref 40–53)
HGB BLD-MCNC: 8 G/DL (ref 13.3–17.7)
INR PPP: 1.46 (ref 0.85–1.15)
MAGNESIUM SERPL-MCNC: 1.6 MG/DL (ref 1.6–2.3)
MCH RBC QN AUTO: 31 PG (ref 26.5–33)
MCHC RBC AUTO-ENTMCNC: 32 G/DL (ref 31.5–36.5)
MCV RBC AUTO: 97 FL (ref 78–100)
O2/TOTAL GAS SETTING VFR VENT: 40 %
O2/TOTAL GAS SETTING VFR VENT: 40 %
PCO2 BLDV: 48 MM HG (ref 40–50)
PCO2 BLDV: 54 MM HG (ref 40–50)
PH BLDV: 7.39 [PH] (ref 7.32–7.43)
PH BLDV: 7.42 [PH] (ref 7.32–7.43)
PHOSPHATE SERPL-MCNC: 3.6 MG/DL (ref 2.5–4.5)
PLATELET # BLD AUTO: 100 10E3/UL (ref 150–450)
PO2 BLDV: 33 MM HG (ref 25–47)
PO2 BLDV: 36 MM HG (ref 25–47)
POTASSIUM BLD-SCNC: 4 MMOL/L (ref 3.4–5.3)
POTASSIUM BLD-SCNC: 4.3 MMOL/L (ref 3.4–5.3)
PROT SERPL-MCNC: 5.6 G/DL (ref 6.8–8.8)
RBC # BLD AUTO: 2.58 10E6/UL (ref 4.4–5.9)
SCANNED LAB RESULT: NORMAL
SODIUM SERPL-SCNC: 139 MMOL/L (ref 133–144)
SODIUM SERPL-SCNC: 140 MMOL/L (ref 133–144)
TACROLIMUS BLD-MCNC: 6.8 UG/L (ref 5–15)
TME LAST DOSE: NORMAL H
TME LAST DOSE: NORMAL H
TSH SERPL DL<=0.005 MIU/L-ACNC: 3.17 MU/L (ref 0.4–4)
UFH PPP CHRO-ACNC: 0.3 IU/ML
WBC # BLD AUTO: 11.1 10E3/UL (ref 4–11)

## 2021-11-19 PROCEDURE — 200N000002 HC R&B ICU UMMC

## 2021-11-19 PROCEDURE — 85520 HEPARIN ASSAY: CPT | Performed by: THORACIC SURGERY (CARDIOTHORACIC VASCULAR SURGERY)

## 2021-11-19 PROCEDURE — 999N000157 HC STATISTIC RCP TIME EA 10 MIN

## 2021-11-19 PROCEDURE — 82310 ASSAY OF CALCIUM: CPT

## 2021-11-19 PROCEDURE — 250N000011 HC RX IP 250 OP 636: Performed by: SURGERY

## 2021-11-19 PROCEDURE — 84443 ASSAY THYROID STIM HORMONE: CPT | Performed by: NURSE PRACTITIONER

## 2021-11-19 PROCEDURE — 99233 SBSQ HOSP IP/OBS HIGH 50: CPT | Performed by: INTERNAL MEDICINE

## 2021-11-19 PROCEDURE — 71045 X-RAY EXAM CHEST 1 VIEW: CPT

## 2021-11-19 PROCEDURE — 250N000012 HC RX MED GY IP 250 OP 636 PS 637: Performed by: NURSE PRACTITIONER

## 2021-11-19 PROCEDURE — 94640 AIRWAY INHALATION TREATMENT: CPT

## 2021-11-19 PROCEDURE — 82803 BLOOD GASES ANY COMBINATION: CPT | Performed by: STUDENT IN AN ORGANIZED HEALTH CARE EDUCATION/TRAINING PROGRAM

## 2021-11-19 PROCEDURE — 250N000011 HC RX IP 250 OP 636

## 2021-11-19 PROCEDURE — 250N000013 HC RX MED GY IP 250 OP 250 PS 637: Performed by: STUDENT IN AN ORGANIZED HEALTH CARE EDUCATION/TRAINING PROGRAM

## 2021-11-19 PROCEDURE — 250N000009 HC RX 250: Performed by: STUDENT IN AN ORGANIZED HEALTH CARE EDUCATION/TRAINING PROGRAM

## 2021-11-19 PROCEDURE — 94668 MNPJ CHEST WALL SBSQ: CPT

## 2021-11-19 PROCEDURE — 80197 ASSAY OF TACROLIMUS: CPT | Performed by: PHYSICIAN ASSISTANT

## 2021-11-19 PROCEDURE — 250N000013 HC RX MED GY IP 250 OP 250 PS 637: Performed by: NURSE PRACTITIONER

## 2021-11-19 PROCEDURE — 250N000011 HC RX IP 250 OP 636: Performed by: THORACIC SURGERY (CARDIOTHORACIC VASCULAR SURGERY)

## 2021-11-19 PROCEDURE — 85610 PROTHROMBIN TIME: CPT | Performed by: THORACIC SURGERY (CARDIOTHORACIC VASCULAR SURGERY)

## 2021-11-19 PROCEDURE — 97110 THERAPEUTIC EXERCISES: CPT | Mod: GP

## 2021-11-19 PROCEDURE — 94640 AIRWAY INHALATION TREATMENT: CPT | Mod: 76

## 2021-11-19 PROCEDURE — 97530 THERAPEUTIC ACTIVITIES: CPT | Mod: GP

## 2021-11-19 PROCEDURE — 83735 ASSAY OF MAGNESIUM: CPT | Performed by: THORACIC SURGERY (CARDIOTHORACIC VASCULAR SURGERY)

## 2021-11-19 PROCEDURE — 94003 VENT MGMT INPAT SUBQ DAY: CPT

## 2021-11-19 PROCEDURE — 250N000013 HC RX MED GY IP 250 OP 250 PS 637

## 2021-11-19 PROCEDURE — 250N000011 HC RX IP 250 OP 636: Performed by: STUDENT IN AN ORGANIZED HEALTH CARE EDUCATION/TRAINING PROGRAM

## 2021-11-19 PROCEDURE — 82040 ASSAY OF SERUM ALBUMIN: CPT | Performed by: THORACIC SURGERY (CARDIOTHORACIC VASCULAR SURGERY)

## 2021-11-19 PROCEDURE — 258N000003 HC RX IP 258 OP 636: Performed by: THORACIC SURGERY (CARDIOTHORACIC VASCULAR SURGERY)

## 2021-11-19 PROCEDURE — 71045 X-RAY EXAM CHEST 1 VIEW: CPT | Mod: 26 | Performed by: RADIOLOGY

## 2021-11-19 PROCEDURE — 250N000012 HC RX MED GY IP 250 OP 636 PS 637: Performed by: INTERNAL MEDICINE

## 2021-11-19 PROCEDURE — 85027 COMPLETE CBC AUTOMATED: CPT | Performed by: THORACIC SURGERY (CARDIOTHORACIC VASCULAR SURGERY)

## 2021-11-19 PROCEDURE — 250N000013 HC RX MED GY IP 250 OP 250 PS 637: Performed by: THORACIC SURGERY (CARDIOTHORACIC VASCULAR SURGERY)

## 2021-11-19 PROCEDURE — 84100 ASSAY OF PHOSPHORUS: CPT | Performed by: THORACIC SURGERY (CARDIOTHORACIC VASCULAR SURGERY)

## 2021-11-19 PROCEDURE — 250N000012 HC RX MED GY IP 250 OP 636 PS 637: Performed by: PHYSICIAN ASSISTANT

## 2021-11-19 PROCEDURE — 99233 SBSQ HOSP IP/OBS HIGH 50: CPT | Mod: 24 | Performed by: NURSE PRACTITIONER

## 2021-11-19 PROCEDURE — 250N000013 HC RX MED GY IP 250 OP 250 PS 637: Performed by: ANESTHESIOLOGY

## 2021-11-19 RX ORDER — ACETAZOLAMIDE 500 MG/5ML
500 INJECTION, POWDER, LYOPHILIZED, FOR SOLUTION INTRAVENOUS EVERY 8 HOURS
Status: COMPLETED | OUTPATIENT
Start: 2021-11-19 | End: 2021-11-20

## 2021-11-19 RX ORDER — MAGNESIUM SULFATE HEPTAHYDRATE 40 MG/ML
2 INJECTION, SOLUTION INTRAVENOUS ONCE
Status: COMPLETED | OUTPATIENT
Start: 2021-11-19 | End: 2021-11-19

## 2021-11-19 RX ORDER — WARFARIN SODIUM 4 MG/1
4 TABLET ORAL
Status: COMPLETED | OUTPATIENT
Start: 2021-11-19 | End: 2021-11-19

## 2021-11-19 RX ADMIN — PROPOFOL 15 MCG/KG/MIN: 10 INJECTION, EMULSION INTRAVENOUS at 02:02

## 2021-11-19 RX ADMIN — LEVALBUTEROL HYDROCHLORIDE 1.25 MG: 1.25 SOLUTION RESPIRATORY (INHALATION) at 20:24

## 2021-11-19 RX ADMIN — PIPERACILLIN SODIUM AND TAZOBACTAM SODIUM 4.5 G: 4; .5 INJECTION, POWDER, LYOPHILIZED, FOR SOLUTION INTRAVENOUS at 20:53

## 2021-11-19 RX ADMIN — AMIODARONE HYDROCHLORIDE 200 MG: 200 TABLET ORAL at 07:50

## 2021-11-19 RX ADMIN — Medication 1 PACKET: at 20:30

## 2021-11-19 RX ADMIN — MAGNESIUM SULFATE IN WATER 2 G: 40 INJECTION, SOLUTION INTRAVENOUS at 05:32

## 2021-11-19 RX ADMIN — MYCOPHENOLATE MOFETIL 1000 MG: 200 POWDER, FOR SUSPENSION ORAL at 20:29

## 2021-11-19 RX ADMIN — Medication 1 PACKET: at 07:55

## 2021-11-19 RX ADMIN — ESCITALOPRAM 5 MG: 5 TABLET, FILM COATED ORAL at 07:50

## 2021-11-19 RX ADMIN — SULFAMETHOXAZOLE AND TRIMETHOPRIM 1 TABLET: 400; 80 TABLET ORAL at 07:50

## 2021-11-19 RX ADMIN — ACETYLCYSTEINE 2 ML: 200 SOLUTION ORAL; RESPIRATORY (INHALATION) at 11:38

## 2021-11-19 RX ADMIN — TACROLIMUS 2 MG: 5 CAPSULE ORAL at 07:52

## 2021-11-19 RX ADMIN — PREDNISOLONE 12.5 MG: 15 SOLUTION ORAL at 07:52

## 2021-11-19 RX ADMIN — LEVALBUTEROL HYDROCHLORIDE 1.25 MG: 1.25 SOLUTION RESPIRATORY (INHALATION) at 11:38

## 2021-11-19 RX ADMIN — NYSTATIN 1000000 UNITS: 500000 SUSPENSION ORAL at 07:50

## 2021-11-19 RX ADMIN — CALCIUM CARBONATE 600 MG (1,500 MG)-VITAMIN D3 400 UNIT TABLET 1 TABLET: at 18:10

## 2021-11-19 RX ADMIN — INSULIN ASPART 1 UNITS: 100 INJECTION, SOLUTION INTRAVENOUS; SUBCUTANEOUS at 08:22

## 2021-11-19 RX ADMIN — QUETIAPINE FUMARATE 25 MG: 25 TABLET ORAL at 02:02

## 2021-11-19 RX ADMIN — Medication 25 MCG/HR: at 02:03

## 2021-11-19 RX ADMIN — FUROSEMIDE 40 MG: 10 INJECTION, SOLUTION INTRAVENOUS at 16:19

## 2021-11-19 RX ADMIN — INSULIN ASPART 1 UNITS: 100 INJECTION, SOLUTION INTRAVENOUS; SUBCUTANEOUS at 23:21

## 2021-11-19 RX ADMIN — NYSTATIN 1000000 UNITS: 500000 SUSPENSION ORAL at 15:10

## 2021-11-19 RX ADMIN — INSULIN ASPART 1 UNITS: 100 INJECTION, SOLUTION INTRAVENOUS; SUBCUTANEOUS at 03:48

## 2021-11-19 RX ADMIN — HEPARIN SODIUM 900 UNITS/HR: 1000 INJECTION INTRAVENOUS; SUBCUTANEOUS at 02:03

## 2021-11-19 RX ADMIN — POTASSIUM CHLORIDE 40 MEQ: 40 SOLUTION ORAL at 07:50

## 2021-11-19 RX ADMIN — QUETIAPINE FUMARATE 25 MG: 25 TABLET ORAL at 07:54

## 2021-11-19 RX ADMIN — MULTIVIT AND MINERALS-FERROUS GLUCONATE 9 MG IRON/15 ML ORAL LIQUID 15 ML: at 07:50

## 2021-11-19 RX ADMIN — TACROLIMUS 2.5 MG: 5 CAPSULE ORAL at 18:10

## 2021-11-19 RX ADMIN — Medication 1 PACKET: at 07:54

## 2021-11-19 RX ADMIN — MYCOPHENOLATE MOFETIL 1000 MG: 200 POWDER, FOR SUSPENSION ORAL at 07:52

## 2021-11-19 RX ADMIN — INSULIN ASPART 2 UNITS: 100 INJECTION, SOLUTION INTRAVENOUS; SUBCUTANEOUS at 11:29

## 2021-11-19 RX ADMIN — OXYCODONE HYDROCHLORIDE 5 MG: 5 TABLET ORAL at 14:52

## 2021-11-19 RX ADMIN — PIPERACILLIN SODIUM AND TAZOBACTAM SODIUM 4.5 G: 4; .5 INJECTION, POWDER, LYOPHILIZED, FOR SOLUTION INTRAVENOUS at 09:41

## 2021-11-19 RX ADMIN — Medication 1 TABLET: at 07:55

## 2021-11-19 RX ADMIN — ACETAMINOPHEN 650 MG: 325 TABLET, FILM COATED ORAL at 07:49

## 2021-11-19 RX ADMIN — NYSTATIN 1000000 UNITS: 500000 SUSPENSION ORAL at 11:22

## 2021-11-19 RX ADMIN — ACETAZOLAMIDE 500 MG: 500 INJECTION, POWDER, LYOPHILIZED, FOR SOLUTION INTRAVENOUS at 16:38

## 2021-11-19 RX ADMIN — LEVOTHYROXINE SODIUM 25 MCG: 0.03 TABLET ORAL at 07:50

## 2021-11-19 RX ADMIN — INSULIN ASPART 2 UNITS: 100 INJECTION, SOLUTION INTRAVENOUS; SUBCUTANEOUS at 15:10

## 2021-11-19 RX ADMIN — ROSUVASTATIN CALCIUM 10 MG: 10 TABLET, FILM COATED ORAL at 07:49

## 2021-11-19 RX ADMIN — NYSTATIN 1000000 UNITS: 500000 SUSPENSION ORAL at 20:30

## 2021-11-19 RX ADMIN — LEVALBUTEROL HYDROCHLORIDE 1.25 MG: 1.25 SOLUTION RESPIRATORY (INHALATION) at 08:03

## 2021-11-19 RX ADMIN — ACETYLCYSTEINE 2 ML: 200 SOLUTION ORAL; RESPIRATORY (INHALATION) at 20:24

## 2021-11-19 RX ADMIN — INSULIN GLARGINE 50 UNITS: 100 INJECTION, SOLUTION SUBCUTANEOUS at 12:24

## 2021-11-19 RX ADMIN — OXYCODONE HYDROCHLORIDE 5 MG: 5 TABLET ORAL at 11:22

## 2021-11-19 RX ADMIN — PREDNISOLONE 12.5 MG: 15 SOLUTION ORAL at 20:30

## 2021-11-19 RX ADMIN — WARFARIN SODIUM 4 MG: 4 TABLET ORAL at 18:11

## 2021-11-19 RX ADMIN — Medication 1 PACKET: at 14:52

## 2021-11-19 RX ADMIN — ACETYLCYSTEINE 2 ML: 200 SOLUTION ORAL; RESPIRATORY (INHALATION) at 15:14

## 2021-11-19 RX ADMIN — Medication 40 MG: at 07:52

## 2021-11-19 RX ADMIN — ACETAZOLAMIDE 500 MG: 500 INJECTION, POWDER, LYOPHILIZED, FOR SOLUTION INTRAVENOUS at 22:39

## 2021-11-19 RX ADMIN — LEVALBUTEROL HYDROCHLORIDE 1.25 MG: 1.25 SOLUTION RESPIRATORY (INHALATION) at 15:14

## 2021-11-19 RX ADMIN — HYDROXYZINE HYDROCHLORIDE 50 MG: 50 TABLET, FILM COATED ORAL at 07:49

## 2021-11-19 RX ADMIN — HYDROXYZINE HYDROCHLORIDE 50 MG: 50 TABLET, FILM COATED ORAL at 02:02

## 2021-11-19 RX ADMIN — QUETIAPINE FUMARATE 25 MG: 25 TABLET ORAL at 20:30

## 2021-11-19 RX ADMIN — CALCIUM CARBONATE 600 MG (1,500 MG)-VITAMIN D3 400 UNIT TABLET 1 TABLET: at 07:49

## 2021-11-19 RX ADMIN — PIPERACILLIN SODIUM AND TAZOBACTAM SODIUM 4.5 G: 4; .5 INJECTION, POWDER, LYOPHILIZED, FOR SOLUTION INTRAVENOUS at 14:52

## 2021-11-19 RX ADMIN — PIPERACILLIN SODIUM AND TAZOBACTAM SODIUM 4.5 G: 4; .5 INJECTION, POWDER, LYOPHILIZED, FOR SOLUTION INTRAVENOUS at 02:30

## 2021-11-19 RX ADMIN — ACETYLCYSTEINE 2 ML: 200 SOLUTION ORAL; RESPIRATORY (INHALATION) at 08:03

## 2021-11-19 RX ADMIN — FUROSEMIDE 40 MG: 10 INJECTION, SOLUTION INTRAVENOUS at 08:50

## 2021-11-19 RX ADMIN — Medication 40 MG: at 20:30

## 2021-11-19 RX ADMIN — Medication 10 MG: at 20:30

## 2021-11-19 RX ADMIN — FLUCONAZOLE IN SODIUM CHLORIDE 400 MG: 2 INJECTION, SOLUTION INTRAVENOUS at 12:10

## 2021-11-19 RX ADMIN — QUETIAPINE FUMARATE 25 MG: 25 TABLET ORAL at 14:52

## 2021-11-19 RX ADMIN — HYDROXYZINE HYDROCHLORIDE 50 MG: 50 TABLET, FILM COATED ORAL at 16:19

## 2021-11-19 ASSESSMENT — ACTIVITIES OF DAILY LIVING (ADL)
ADLS_ACUITY_SCORE: 21
ADLS_ACUITY_SCORE: 21
ADLS_ACUITY_SCORE: 23
ADLS_ACUITY_SCORE: 21
ADLS_ACUITY_SCORE: 21
ADLS_ACUITY_SCORE: 23
ADLS_ACUITY_SCORE: 25
ADLS_ACUITY_SCORE: 23
ADLS_ACUITY_SCORE: 21
ADLS_ACUITY_SCORE: 23
ADLS_ACUITY_SCORE: 21
ADLS_ACUITY_SCORE: 23
ADLS_ACUITY_SCORE: 25
ADLS_ACUITY_SCORE: 23
ADLS_ACUITY_SCORE: 23
ADLS_ACUITY_SCORE: 21
ADLS_ACUITY_SCORE: 21
ADLS_ACUITY_SCORE: 23
ADLS_ACUITY_SCORE: 25
ADLS_ACUITY_SCORE: 23
ADLS_ACUITY_SCORE: 23
ADLS_ACUITY_SCORE: 25

## 2021-11-19 ASSESSMENT — MIFFLIN-ST. JEOR: SCORE: 1396

## 2021-11-19 NOTE — PROGRESS NOTES
IP Diabetes Management  Daily Note           Assessment and Plan:   HPI: Edson Thornton is a 56 year old male with a PMH significant for NSIP/ILD, bronchiectasis, moderate PH, RA, SALTY, chronic HSV infection, hypogammaglobulinemia, steroid-induced diabetes, hypothyroidism, PFO, HTN, HLD, duodenal anomaly, anxiety, and depression.  Admitted on 9/5/21 from OSH for acute on chronic respiratory failure 2/2 ILD exacerbation, now s/p BSLT on 10/16/21.  Prolonged vent wean s/p trach and PEG/J tube placement with thoracic surgery 10/29.  Post-op course otherwise complicated by encephalopathy and diffuse weakness, acute to subacute CVA, afib with RVR, BRENNAN, GI bleed, and Candidemia/Candida empyema, and anxiety.  Code called 11/2 for bradycardia/asystole and progressively hypotensive, found to have acute drop in hemoglobin and bloody G tube output.  Intermittently tolerating PS/TD trials and with ongoing improvement in mentation and weakness.  Endocrinology consulted for glucose management.        Assessment:   1)steroid induced Diabetes Mellitus, controlled (A1c 6.6 prior to transplant), with TF and steroid induced hyperglycemia  2) tube feeding induced hyperglycemia  3) S/P BSLT 10/16/21      Plan:    -continue  Lantus to 50 units daily at 1200                 -Novolog high resistance sliding scale Q 4 hours                  -BG monitoring Q 4 hours                 -hypoglycemia protocol                 -diabetes education needs will be assessed closer to discharge, when more alert/stable                 -on discharge, will recommend outpatient follow up with [facility] service or endocrinology closer to home in [place], [place]    For future prednisone tapers (if TF remains unchanged)-  When prednisone decreases to 12.5 mg in AM and 10 mg in PM---keep Lantus at 50 units daily  When prednisone decreases to 10 mg BID---decrease Lantus to 45 units daily     Endocrinology will sign off at this time, please page [phone]  with questions or if changes to TF or PO status.   Discussed with CVTS and they are agreeable.    Plan discussed with bedside RN, and primary team through this note.        Interval History and Assessment: interval glucose trend reviewed:         BG largely within target<180.  Continue Lantus 50 units daily.  Continue with high sliding scale insulin novolog Q 4 hours.      He tolerated being off sedation most of the day yesterday, continues with loose dark brown stools.      May need LTACH for ongoing vent weaning, plan to reevaluate around 11/24.    Current nutritional intake and type: None      Planned Procedures/surgeries: none  Steroid planning: prednisolone 12.5 mg PO BID, next taper on 11/21      D5W-containing solutions/medications: medications as suspensions that likely contain glucose    PTA Diabetes Regimen:   Lantus 5 units daily  Novolog 1:15g CHO with meals  novolog 3-11 units with meals and bedtime, ?sliding scale insulin   BG monitor: One Touch Verio  Frequency of checks: unknown    Discharge Planning: possible LTACH, date TBD           Diabetes History:   Type of Diabetes: Steroid Induced Diabetes Mellitus, stress hyperglycemia  Lab Results   Component Value Date    A1C 5.3 11/15/2021    A1C 6.6 09/07/2021    A1C 6.5 09/05/2021              Review of Systems:     The Review of Systems is negative other than noted in the Interval History.           Medications:     Current Facility-Administered Medications   Medication     acetaminophen (TYLENOL) tablet 650 mg     acetaminophen (TYLENOL) tablet 975 mg     acetylcysteine (MUCOMYST) 20 % nebulizer solution 2 mL     albuterol (PROVENTIL) neb solution 2.5 mg     amiodarone (PACERONE) tablet 200 mg     banatrol plus packet 1 packet     bisacodyl (DULCOLAX) Suppository 10 mg     calcium carbonate 600 mg-vitamin D 400 units (CALTRATE) per tablet 1 tablet     dextrose 10% infusion     glucose gel 15-30 g    Or     dextrose 50 % injection 25-50 mL    Or      glucagon injection 1 mg     diphenhydrAMINE (BENADRYL) capsule 50 mg    Or     diphenhydrAMINE (BENADRYL) injection 50 mg     diphenhydrAMINE (BENADRYL) injection 50 mg     EPINEPHrine (ADRENALIN) kit 0.3 mg     escitalopram (LEXAPRO) tablet 5 mg     famotidine (PEPCID) infusion 20 mg     fentaNYL (SUBLIMAZE) infusion     fluconazole (DIFLUCAN) intermittent infusion 400 mg in NaCl     folic acid-vit B6-vit B12 (FOLGARD) per tablet 1 tablet     furosemide (LASIX) injection 40 mg     heparin infusion 25,000 units in D5W 250 mL ANTICOAGULANT     heparin lock flush 10 UNIT/ML injection 5-20 mL     heparin lock flush 10 UNIT/ML injection 5-20 mL     hydrALAZINE (APRESOLINE) injection 20 mg     hydrOXYzine (ATARAX) tablet 25 mg    Or     hydrOXYzine (ATARAX) tablet 50 mg     insulin aspart (NovoLOG) injection (RAPID ACTING)     insulin glargine (LANTUS PEN) injection 50 Units     labetalol (NORMODYNE/TRANDATE) injection 20 mg     levalbuterol (XOPENEX) neb solution 1.25 mg     levothyroxine (SYNTHROID/LEVOTHROID) tablet 25 mcg     Lidocaine (LIDOCARE) 4 % Patch 2 patch     lidocaine (XYLOCAINE) 2 % solution 5 mL     lidocaine patch in PLACE     magnesium hydroxide (MILK OF MAGNESIA) suspension 30 mL     melatonin tablet 10 mg     methylPREDNISolone sodium succinate (solu-MEDROL) injection 125 mg     metoprolol tartrate (LOPRESSOR) tablet 25 mg     multivitamins w/minerals liquid 15 mL     mycophenolate (CELLCEPT BRAND) suspension 1,000 mg     naloxone (NARCAN) injection 0.2 mg    Or     naloxone (NARCAN) injection 0.4 mg    Or     naloxone (NARCAN) injection 0.2 mg    Or     naloxone (NARCAN) injection 0.4 mg     nystatin (MYCOSTATIN) suspension 1,000,000 Units     ondansetron (ZOFRAN-ODT) ODT tab 4 mg    Or     ondansetron (ZOFRAN) injection 4 mg     oxyCODONE (ROXICODONE) tablet 5 mg     oxymetazoline (AFRIN) 0.05 % spray 2 spray     pantoprazole (PROTONIX) 2 mg/mL suspension 40 mg     piperacillin-tazobactam (ZOSYN)  "4.5 g vial to attach to  mL bag     potassium chloride (KAYCIEL) solution 40 mEq     prednisoLONE (ORAPRED) 15 MG/5 ML solution 12.5 mg     [START ON 11/21/2021] predniSONE (DELTASONE) tablet 10 mg     [START ON 11/21/2021] predniSONE (DELTASONE) tablet 12.5 mg     prochlorperazine (COMPAZINE) injection 10 mg    Or     prochlorperazine (COMPAZINE) tablet 10 mg     propofol (DIPRIVAN) infusion     protein modular (PROSOURCE TF) 1 packet     QUEtiapine (SEROquel) tablet 25 mg     QUEtiapine (SEROquel) tablet 25 mg     rosuvastatin (CRESTOR) tablet 10 mg     senna-docusate (SENOKOT-S/PERICOLACE) 8.6-50 MG per tablet 1 tablet     sodium chloride (PF) 0.9% PF flush 10-20 mL     sodium chloride (PF) 0.9% PF flush 10-40 mL     sodium chloride (PF) 0.9% PF flush 3 mL     sodium chloride (PF) 0.9% PF flush 3 mL     sulfamethoxazole-trimethoprim (BACTRIM) 400-80 MG per tablet 1 tablet     tacrolimus (GENERIC EQUIVALENT) suspension 2 mg     tacrolimus (GENERIC EQUIVALENT) suspension 2 mg     Warfarin Therapy Reminder (Check START DATE - warfarin may be starting in the FUTURE)            Physical Exam:    /68   Pulse 75   Temp 97.1  F (36.2  C) (Axillary)   Resp 14   Ht 1.626 m (5' 4\")   Wt 65.5 kg (144 lb 6.4 oz)   SpO2 97%   BMI 24.79 kg/m    General: Asleep. Sitting up in chair.  HEENT: normocephalic, atraumatic. Oral mucous membranes moist.   Lungs: unlabored respiration, no cough, +trach  ABD: rounded, nondistended  Skin: warm and dry, no obvious lesions  MSK:  moves all extremities  Mental status:  asleep, unable to assess orientation  Psych:   calm            Data:     Recent Labs   Lab 11/19/21  0345 11/19/21  0341 11/18/21  2357 11/18/21  2133 11/18/21  1648 11/18/21  1611   * 160* 168* 209* 201* 200*     Lab Results   Component Value Date    WBC 11.1 (H) 11/19/2021    WBC 13.1 (H) 11/18/2021    WBC 13.7 (H) 11/17/2021    HGB 8.0 (L) 11/19/2021    HGB 8.0 (L) 11/18/2021    HGB 8.6 (L) " 11/17/2021    HCT 25.0 (L) 11/19/2021    HCT 25.5 (L) 11/18/2021    HCT 27.6 (L) 11/17/2021    MCV 97 11/19/2021    MCV 99 11/18/2021    MCV 99 11/17/2021     (L) 11/19/2021     (L) 11/18/2021     (L) 11/17/2021     Lab Results   Component Value Date     11/19/2021     11/18/2021     11/18/2021    POTASSIUM 4.0 11/19/2021    POTASSIUM 4.4 11/18/2021    POTASSIUM 3.8 11/18/2021    CHLORIDE 101 11/19/2021    CHLORIDE 102 11/18/2021    CHLORIDE 106 11/18/2021    CO2 32 11/19/2021    CO2 28 11/18/2021    CO2 28 11/18/2021     (H) 11/19/2021     (H) 11/19/2021     (H) 11/18/2021     Lab Results   Component Value Date    BUN 47 (H) 11/19/2021    BUN 42 (H) 11/18/2021    BUN 48 (H) 11/18/2021     Lab Results   Component Value Date    TSH 7.90 (H) 10/29/2021    TSH 0.11 (L) 09/07/2021    TSH 0.19 (L) 09/06/2021     Lab Results   Component Value Date    AST 18 11/19/2021    AST 14 11/18/2021    AST 15 11/17/2021    ALT 29 11/19/2021    ALT 28 11/18/2021    ALT 30 11/17/2021    ALKPHOS 123 11/19/2021    ALKPHOS 123 11/18/2021    ALKPHOS 150 11/17/2021           35 minutes spent on the date of the encounter doing chart review, history and exam, documentation and further activities per the note      Over 50% of my time on the unit was spent counseling the patient and/or coordinating care regarding acute hyperglycemia management.  See note for details.    To contact Endocrine Diabetes service:   From 8AM-4PM: page inpatient diabetes provider that is following the patient  For questions or updates from 4PM-8AM: page the diabetes job code for on call fellow: [phone]    ILIR Salomon, CNP  Inpatient Diabetes Management Service  Pager [phone]

## 2021-11-19 NOTE — PROGRESS NOTES
Pulmonary Medicine  Cystic Fibrosis - Lung Transplant Team  Progress Note  2021       Patient: Edson Thornton  MRN: [id number]  : 1965 (age 56 year old)  Transplant: 10/16/2021 (Lung), POD#34  Admission date: 2021    Assessment & Plan:     Edson Thornton is a 56 year old male with a PMH significant for NSIP/ILD, bronchiectasis, moderate PH, RA, SALTY, chronic HSV infection, hypogammaglobulinemia, steroid-induced diabetes, hypothyroidism, PFO, HTN, HLD, duodenal anomaly, anxiety, and depression.  Admitted on 21 from OSH for acute on chronic respiratory failure 2/2 ILD exacerbation, now s/p BSLT on 10/16/21.  Prolonged vent wean s/p trach and PEG/J tube placement with thoracic surgery 10/29.  Post-op course otherwise complicated by encephalopathy and diffuse weakness, acute to subacute CVA, afib with RVR, BRENNAN, GI bleed, Candidemia/Candida empyema, and anxiety.  Code called  for bradycardia/asystole, progressive hypotensive, found to have significant GI bleed, EGD with adherent clot near PEG tube site that was clipped.  Intermittently tolerating PS/TD trials and with ongoing improvement in mentation and weakness.      Today's recommendations:    - TD/PS trials during the day per ICU team otherwise CMV between and overnight  - Diuresis per ICU team  - CXR daily while left chest tube in place  - Tacrolimus repeat level today subtherapeutic, increase dose. Repeat level  (ordered)  - Prednisone taper ordered   - Coccidioides Ag,  serum pending  - Continue Zosyn for 2 week course through   - Follow pending pleural fungal cultures and fungal/bacterial blood cultures from   - Continue fluconazole (reevaluate around , repeat fungal cultures pending), EKG weekly for QTc monitoring (, ordered)  - Will need repeat imaging to ensure left pleural effusion almost entirely resolved before removing chest tube and concluding fluconazole, reevaluate around   - Repeat  IgG (12/15, ordered)  - DSA monitoring q2 weeks, (11/29) ordered  - May need LTACH for ongoing vent weaning (reevaluate around 11/24)   - Check TSH (reflex free T4) given bradycardia (ordered for you)     S/p BSLT for ILD:  Acute hypoxic respiratory failure s/p trach:   Bilateral pleural effusions: Unfortunately had not received vaccination for flu, PNA, or COVID-19 PTA.  Explant pathology with NSIP, no malignancy.  PGD 2-3.  Weaned off paralytic 10/19 (for vent dyssynchrony) and Caroline 10/22.  Initial difficulty weaning sedation given agitation then with neurological findings as below.  Prolonged vent wean s/p trach and PEG/J tube placement with thoracic surgery 10/29.  Code called 11/2 for bradycardia/asystole (required 1 round of CPR, no medications) then progressively hypotensive, GI bleed as below.  Chest CT 11/2 with increased bilateral pleural effusions (moderate left, small right), bibasilar atelectasis with area of hypoenhancing parenchyma in LLL (suspicious for infection), and numerous nondisplaced anterior rib fractures bilaterally.  S/p left chest tube placement in IR 11/3 for pleural effusion/empyema (as below), right deferred given very little effusion on US.  Problems with trach cuff leak 11/3 (requiring multiple exchanges), bronch 11/14 with trach in good position with cuff well sealed in trachea and small to moderate secretions mostly in lower lobes.  Intermittently tolerating PS trials, hypoxia slowly improving.  - Nebs: levalbuterol and Mucomyst QID  - Aggressive pulmonary toilet with chest physiotherapy QID  - TD/PS trials during the day per ICU team otherwise CMV between and overnight  - Diuresis per ICU team  - CXR daily while left chest tube in place, today with stable bibasilar opacities/atelectasis and pleural effusions (personally reviewed with Dr. Wells)  - Repeat bronch not indicated today, revisit prn  - May need LTACH for ongoing vent weaning (reevaluate around 11/24)       Immunosuppression: s/p induction therapy with basiliximab 10/16 (and high dose IV steroid) and 10/20  - Tacrolimus 2 mg BID (suspension, via G tube).  Goal level 8-12.  Repeat level 11/19 subtherapeutic at 6.8 (11hr), increase dose to 2.5 mg BID. Repeat level 11/22 (ordered)  - MMF 1000 mg BID (11/2, decreased given GI bleed, AZA to be avoided given TPMT)  - Prednisolone 12.5 mg BID, next taper due 11/21 (ordered)  Date AM dose (mg) PM dose (mg)   11/7/21 12.5 12.5   11/21/21 12.5 10   12/5/21 10 10   1/2/22 10 7.5   1/30/22 7.5 7.5   2/27/22 7.5 5   3/27/22 5 5   4/24/22 5 2.5      Prophylaxis:   - Bactrim for PJP ppx (held 11/2-11/5 d/t BRENNAN)  - Nystatin for oral candidiasis ppx, 6 month course  - See below for serologies and viral ppx:    Donor Recipient Intervention   CMV status Negative Negative None, CMV monthly (due 12/14, neg 11/16)   EBV status Positive Positive None, EBV monthly (due 12/14, neg 11/16)   HSV status N/A Positive S/p acyclovir POD #1-30 (recent infection history pre-txp)      ID: Concern for possible Strongyloides exposure pre-transplant s/p ivermectin x1 dose (9/17).  Donor and recipient cultures NGTD.  S/p IV ceftazidime/vancomycin for 48h per protocol and additional empiric ceftazidime 10/19-10/23 given recurrent fevers as below.  Cryptococcal Ag negative 10/23.  - Monthly Coccidioides Ag x12 months post-transplant per ID (urine and serum negative 10/17), 11/17 urine neg, serum pending     Klebsiella pneumoniae:  Pseudomonas fluorescens/putida: Klebsiella initially noted trach sputum culture 11/10 (R-ampicillin, ciprofloxacin; I-ampicillin/sulbactam, levofloxacin).  Started on ceftriaxone 11/11, transitioned to ertapenem 11/12-11/13, and back to ceftriaxone 11/14.  Bronch culture 11/12 with Klebsiella and Pseudomonas fluorescens/putida (R-meropenem).     - ABX: Zosyn (11/15) for 2 week course through 11/29     Recurring fevers, Resolved:  Fevers post-op, Tmax 101.7 POD #1.  Febrile  with worsening leukocytosis again 10/25, generally persisting.  LP (10/29), xanthochromic with pleocytosis thought to be appropriate given RBC and WBCs, no ABX recommended per transplant ID and neurology.  S/p empiric meropenem 10/28-11/2.  Now afebrile, ABX as above.     Disseminated Candida: Noted on blood cultures 10/20 and 10/22.  BDG fungitell positive (399) on 10/20.  Respiratory cultures with persistent Candida albicans.  TC 10/23 without evidence of endocarditis.  Ophthalmology consult 10/24 with benign dilated fundoscopic exam.  Candida empyema also noted 10/25, chest tubes inadvertently removed by CVTS 10/28, left chest tube replaced by IR 11/3 as above with ongoing Candida on cultures.  QTc 493 msec (11/16).  - Repeat pleural fungal cultures and fungal/bacterial blood cultures (11/18), pending  - Fluconazole (10/26 per transplant ID, prior micafungin 10/22-10/27), repeat EKG for QTc monitoring 11/23 (ordered), LFTs as below (see transplant ID note 11/5). Will need repeat imaging to ensure left pleural effusion almost entirely resolved before removing chest tube and concluding fluconazole, reevaluate around 11/24)      HSV: Chronic intermittent active infection pre-transplant with recent HSV infection: crusted lesions throughout left side of jaw, s/p 10 day treatment course of ACV through 10/9.  HSV PCR blood negative 10/17.  S/p ACV ppx as above (started POD #1 instead of POD #8 given HSV history and location).     Hypogammaglobulinemia: IgG previously low at 364 (9/7).  Noted at 265 at time of transplant, s/p IVIG 10/21, repeat IgG (11/17) 198, s/p IVIG (with premedication) 11/18.  - Repeat IgG (12/15, ordered)     Positive cross match: Note that he received two doses of rituximab in June, which is likely contributing to cross match result.  DSA most recently negative 11/15.  - DSA monitoring q2 weeks, (11/29) ordered     SALTY: Noted pre-transplant.  Home CPAP 6-12 cm H2O.  - Resume BiPAP after  decannulation.     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  MRI 10/23 with multifocal subacute infarcts within both cerebral hemispheres and left cerebellum.  DDx include surgery v embolic v infectious.  Heparin drip started 10/23 (intermittently held with GI bleed as below).  Repeat stroke code 10/25 d/t marked decrease in responsiveness with sedation wean, pupil inequality, and absent gag reflex.  CTA head without obvious new pathology, MRI brain (with and without contrast) primarily revealing for infarct, low likelihood of PRES.  Ammonia normal.  VEEG per neuro with severe diffuse encephalopathy.  Gradual improvement noted since 10/29, repeat head CT 11/2 (following code) without new acute intracranial abnormalities.  - AC management per primary team as below  - PT/OT     Afib with RVR: Noted 10/18, started on amiodarone drip and converted to SR, Transitioned to PO 10/21, dose decreased 10/29 (anticipate 4 week course). AC as above.  Metoprolol resumed 11/3.     Right subclavian DVT: Seen on UE US from 11/4.  Heparin drip resumed 11/5.        Recurrent GI bleed, Resolved: Hemoglobin dropped to 6.6 10/22, s/p 2 units pRBC.  OG tube with bloody output, EGD 10/23 noted NJ/OG tube trauma with scant oozing.  Progressive hypotension 11/2, hemoglobin 5.8 with bloody G tube output.  Heparin drip held, transitioned to PPI drip, and MTP activated.  EGD 11/2 with large amount of clotted blood in stomach and area of raised mucosa with small adherent clot near PEG tube site that was clipped, no active bleeding.  Maroon stools 11/3, repeat EGD with extensive old blood in stomach, no active bleeding, small nodular area with prior clips clipped again.  Most  "recent EGD 11/5 with ulcer noted at PEG tube bumper site, gastritis, and suction marks from G tube. TF noted in G tube drainage bag 11/7, AXR with GJ tube tip projecting over proximal duodenum. GJ tube exchanged 11/9 by GI.      Elevated LFTs: Shock liver post-op.  Initial ALT//230 evening of surgery, then with marked increase to 1550/1246 POD #1.  Intermittent alk phos elevation since.    Hypomagnesemia: Suppressed reabsorption 2/2 CNI.  Requiring nearly daily IV replacement.  - Will start PO magnesium supplementation when diarrhea is improved     We appreciate the excellent care provided by the CVICU and CVTS teams.  Recommendations communicated via in person rounding and this note.  Will continue to follow along closely, please do not hesitate to call with any questions or concerns.     Patient seen and discussed with Dr. Wells.     Betina Jeffrey APRN, CNP   Inpatient Nurse Practitioner  Pulmonary CF/Transplant  Pager #[phone]       Subjective & Interval History:     PS yesterday at 10/8 50% for 2 hours, limited d/t anxiety and tachypnea. Otherwise on CMV 12/460/6/40. Peep and O2 requirements decreased from yesterday. Propofol was weaned yesterday morning for 2 hours, but resumed.  Currently tolerating PS this morning at 8/6 40%. Breathing comfortably with infrequent cough and small sputum production. CPT x4 yesterday. Working with PT. Left chest tube remains, low output. Net -282mL with diuresis yesterday.  Denies pain or nausea.  Loose stools via rectal tube.    Review of Systems:     ROS as above otherwise limited due to communication barriers    Physical Exam:     Vital signs:  Temp: 98.4  F (36.9  C) Temp src: Oral BP: 130/82 Pulse: 86   Resp: 12 SpO2: 100 % O2 Device: (S) Trach dome Oxygen Delivery: 50 LPM Height: 162.6 cm (5' 4\") Weight: 65.5 kg (144 lb 6.4 oz)  I/O:     Intake/Output Summary (Last 24 hours) at 11/19/2021 1227  Last data filed at 11/19/2021 1200  Gross per 24 hour   Intake " 2981.7 ml   Output 3997 ml   Net -1015.3 ml     Constitutional: lying in bed, in no apparent distress.   HEENT: Eyes with pink conjunctivae, anicteric.  Oral mucosa moist without lesions.  Trache cdi  PULM: Fair air flow bilaterally.  Bibasilar crackles, no rhonchi, no wheezes.  Non-labored breathing on PS.  CV: Normal S1 and S2.  RRR.  No murmur, gallop, or rub.  No peripheral edema.   ABD: NABS, soft, non tender, nondistended.  PEG/J not visualized  MSK:  Able to squeeze left > right hand and move BLE.  + apparent muscle wasting.   NEURO: Alert, follows commands, nonverbal communication by head nod/shake  SKIN: Warm, dry.  No rash on limited exam.   PSYCH: Mood stable.    Lines, Drains, and Devices:  Peripheral IV 11/02/21 Anterior;Right Upper forearm (Active)   Site Assessment WD 11/19/21 1200   Line Status Infusing;Checked every 1-2 hour 11/19/21 1200   Dressing Intervention Dressing reinforced 11/08/21 2000   Phlebitis Scale 0-->no symptoms 11/19/21 1200   Infiltration Scale 0 11/19/21 1200   Infiltration Site Treatment Method  None 11/18/21 1600   Number of days: 17       PICC Triple Lumen 11/04/21 Left Basilic Access. PICC okay to use. (Active)   Site Assessment [facility] 11/19/21 1200   External Cath Length (cm) 1 cm 11/04/21 1747   Extremity Circumference (cm) 28 cm 11/04/21 1747   Dressing Intervention Chlorhexidine patch;Transparent;Securing device 11/19/21 1200   Dressing Change Due 11/21/21 11/19/21 1200   [place] - Status blood return noted;saline locked 11/19/21 1200   [place] - Cap Change Due 11/23/21 11/19/21 0800   Red - Status infusing 11/19/21 1200   Red - Cap Change Due 11/23/21 11/19/21 0800   White - Status infusing 11/19/21 1200   White - Cap Change Due 11/23/21 11/19/21 0800   Extravasation? No 11/17/21 2000   Line Necessity Yes, meets criteria 11/19/21 0800   Number of days: 15     Data:     LABS    CMP:   Recent Labs   Lab 11/19/21  1128 11/19/21  0818 11/19/21  0345 11/19/21  0341 11/18/21  2137  11/18/21  1648 11/18/21 0341 11/18/21 0338 11/17/21 2010 11/17/21  1543 11/17/21  0743 11/17/21 0337 11/16/21  0855 11/16/21  0357   NA  --   --   --  139  --  136  --  142  --  148*  --  140   < > 139  139   POTASSIUM  --   --   --  4.0  --  4.4  --  3.8  --  3.4  --  4.2   < > 4.0  4.0   CHLORIDE  --   --   --  101  --  102  --  106  --  107  --  103   < > 102  102   CO2  --   --   --  32  --  28  --  28  --  27  --  30   < > 28  28   ANIONGAP  --   --   --  6  --  6  --  8  --  14  --  7   < > 9  9   * 147* 149* 160*   < > 201*   < > 168*   < > 200*   < > 210*   < > 216*  216*   BUN  --   --   --  47*  --  42*  --  48*  --  40*  --  45*   < > 44*  44*   CR  --   --   --  0.68  --  0.78  --  0.67  --  0.68  --  0.77   < > 0.62*  0.62*   GFRESTIMATED  --   --   --  >90  --  >90  --  >90  --  >90  --  >90   < > >90  >90   ERIK  --   --   --  8.5  --  7.9*  --  7.8*  --  7.8*  --  8.7   < > 9.1  9.1   MAG  --   --   --  1.6  --   --   --  2.2  --  1.9  --  1.8  --  1.7   PHOS  --   --   --  3.6  --   --   --  3.5  --   --   --  4.2  --  5.4*   PROTTOTAL  --   --   --  5.6*  --   --   --  4.9*  --   --   --  5.3*  --  5.4*   ALBUMIN  --   --   --  1.9*  --   --   --  1.8*  --   --   --  2.1*  --  2.1*   BILITOTAL  --   --   --  0.2  --   --   --  0.2  --   --   --  0.2  --  0.2   ALKPHOS  --   --   --  123  --   --   --  123  --   --   --  150  --  177*   AST  --   --   --  18  --   --   --  14  --   --   --  15  --  17   ALT  --   --   --  29  --   --   --  28  --   --   --  30  --  35    < > = values in this interval not displayed.     CBC:   Recent Labs   Lab 11/19/21  0341 11/18/21 0338 11/17/21 0337 11/16/21  0357   WBC 11.1* 13.1* 13.7* 14.3*   RBC 2.58* 2.58* 2.79* 3.08*   HGB 8.0* 8.0* 8.6* 9.5*   HCT 25.0* 25.5* 27.6* 30.5*   MCV 97 99 99 99   MCH 31.0 31.0 30.8 30.8   MCHC 32.0 31.4* 31.2* 31.1*   RDW 16.7* 17.2* 17.1* 17.2*   * 117* 132* 141*       INR:   Recent Labs   Lab  11/19/21  0341 11/18/21  0338 11/17/21  0337 11/16/21  0357   INR 1.46* 1.40* 1.23* 1.18*       Glucose:   Recent Labs   Lab 11/19/21  1128 11/19/21  0818 11/19/21  0345 11/19/21  0341 11/18/21  2357 11/18/21  2133   * 147* 149* 160* 168* 209*       Blood Gas:   Recent Labs   Lab 11/19/21  1024 11/15/21  1012 11/14/21  0534   PHV 7.42 7.40 7.43   PCO2V 54* 46 47   PO2V 33 40 38   HCO3V 35* 29* 31*   LORI 9.0* 3.3* 5.8*   O2PER 40 50 30       Culture Data No results for input(s): CULT in the last 168 hours.    Virology Data:   Lab Results   Component Value Date    FLUAH1 Negative 11/12/2021    FLUAH3 Negative 11/12/2021    EY2547 Negative 11/12/2021    IFLUB Negative 11/12/2021    RSVA Negative 11/12/2021    RSVB Negative 11/12/2021    PIV1 Negative 11/12/2021    PIV2 Negative 11/12/2021    PIV3 Negative 11/12/2021    HMPV Negative 11/12/2021    HRVS Negative 11/12/2021    ADVBE Negative 11/12/2021    ADVC Negative 11/12/2021    ADVC Negative 10/17/2021       Historical CMV results (last 3 of prior testing):  Lab Results   Component Value Date    CMVQNT Not Detected 11/16/2021    CMVQNT Not Detected 11/12/2021    CMVQNT Not Detected 10/26/2021     No results found for: CMVLOG    Urine Studies    Recent Labs   Lab Test 11/01/21  1336 10/25/21  1507   URINEPH 5.5 5.5   NITRITE Negative Negative   LEUKEST Negative Negative   WBCU 0 2       Most Recent [place] Pulmonary Function Testing (FVC/FEV1 only)  No results found for: 20002  No results found for: 20003  No results found for: 20015  No results found for: 20016    IMAGING    Recent Results (from the past 48 hour(s))   XR Chest Port 1 View    Narrative    Exam: XR CHEST PORT 1 VIEW, 11/18/2021 1:07 AM    Comparison: Chest x-ray 11/17/2021    History: s/p lung transplant    Findings:  A single portable AP semiupright view of the chest is obtained.  Postsurgical changes of bilateral lung transplantation, clamshell  sternotomy wires are intact. Tracheostomy tube  tip is in the mid  thoracic trachea. Left upper extremity PICC tip terminates within the  mid superior vena cava. Left basilar chest tube in place.    Trachea is midline. Mediastinum is within normal limits.  Cardiopulmonary silhouette is within normal limits. Slight increase in  left basilar pleural effusion and associated atelectasis. Unchanged  right basilar atelectasis. No appreciable pneumothorax. The upper  abdomen is unremarkable.      Impression    Impression:   1. Slight increase in left basilar pleural effusion and associated  atelectasis.  2. Unchanged right basilar atelectasis.    I have personally reviewed the examination and initial interpretation  and I agree with the findings.    ZHANNA DONG MD         SYSTEM ID:  [id number]   XR Chest Port 1 View    Narrative    EXAM: XR CHEST PORT 1 VIEW  11/18/2021 11:42 AM     HISTORY:  sob       COMPARISON:  11/18/2021 from 0104 hours    FINDINGS:   Frontal radiograph of the chest. Percutaneous tracheostomy tube  projects over the midthoracic trachea. Postsurgical changes of  bilateral lung transplant with intact clam shell sternotomy wires.  Stable left basilar chest tube. Left upper extremity PICC line tip  terminates in the mid SVC.    Trachea is midline. Stable cardiac silhouette. Small bilateral pleural  effusions. Stable bibasilar opacities. No new airspace consolidation.  No pneumothorax.      Impression    IMPRESSION:   1. Stable small bilateral pleural effusions.  2. Grossly unchanged bibasilar atelectasis.    I have personally reviewed the examination and initial interpretation  and I agree with the findings.    DAVIN ANGUIANO MD         SYSTEM ID:  [id number]   XR Chest Port 1 View    Narrative    Exam: XR CHEST PORT 1 VIEW, 11/19/2021 1:05 AM    Comparison: Chest x-ray 11/18/2021    History: s/p lung transplant    Findings:  Single portable AP supine projection of the chest is obtained.  Tracheostomy tube tip is in the mid thoracic trachea. Left  upper chest  PICC tip at superior cavoatrial junction. Left basilar chest tube in  place. Postoperative changes of bilateral lung transplantation,  clamshell sternotomy wires are intact.    Trachea is midline. Mediastinum is within normal limits.  Cardiopulmonary silhouette is within normal limits. Slight reduction  in left basilar pleural effusion and associated atelectasis. Slightly  reduced right-sided pleural effusion.. No pneumothorax. The upper  abdomen is unremarkable.      Impression    Impression:   1. Slight reduction in left basilar pleural effusion and associated  atelectasis with chest tube placed.  2. Slightly reduced right-sided pleural effusion    I have personally reviewed the examination and initial interpretation  and I agree with the findings.    KRISTA SON MD         SYSTEM ID:  [id number]

## 2021-11-19 NOTE — PROGRESS NOTES
CLINICAL NUTRITION SERVICES - BRIEF NOTE   (see RD note on 11/16 for full assessment)    Ongoing loose stool despite added fiber. Concern for poor enteral absorption.   Plan to transition to semi-elemental, fiber-containing formula today.     Nutrition changes today:  Run Vital 1.5 Conner @ 60ml/hr  (1440ml/day) + Prosource (1 pkt TID) + Banatrol (1 pkt BID) via [place] FFT will provide: 2280 kcals (36 kcal/kg), 130 g protein (2 g/kg), 1100 ml free H20, 269 g CHO, and 12 g fiber daily.    Sona Amezquita, RD, LD  [id number]  Pgr: [phone]

## 2021-11-19 NOTE — PROGRESS NOTES
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 Corral. 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  UGIB causing code blue on 11/2.      OVERNIGHT EVENTS:  -NAEO    TODAY'S PROGRESS:  - Wean Fent as tolerated  - PST this AM  - Trach Dome this PM  - Hold Metoprolol  - Stop Amio    PLAN:  Neuro/ pain/ sedation:  Acute postoperative pain   Anxiety and depression  Acute to subacute CVA, b/l cerebral hemispheres and L cerebellum, suspect embolic  Encephalopathy and diffuse weakness - improving slightly   R ear lateral canal floor excoriation with bleeding - resolved   - Tylenol and oxycodone prn, lido patch                - wean fentanyl as tolerated  - PTA lexapro  - Propofol gtt  - Seroquel 25 BID               -PRN Seroquel    Pulmonary care:   SALTY on home CPAP  Acute hypoxic respiratory failure s/p b/l lung transplant 10/16/21  S/p tracheostomy 10/29  Empyema with Candida s/p chest tube 11/3/21  - mechanically ventilated via trach, PST, trach dome               - CMV overnight    - PST this morning    - VBG after PST trial  - VBG after trach dome  - Levalbuterol nebs scheduled and Mucomyst, chest PT  - Daily CXR  - appreciate Pulmonary       Cardiovascular:    HTN  Afib   PFO  Vasoplegic shock in setting of hypovolemia - resolved  - Monitor hemodynamic status.   - MAP goal <100, SBP <140  - metoprolol 25 BID   - continue to hold  - prn labetalol  - rosuvastatin 10 mg  - Amiodarone 200 mg daily (end date 11/27/21)   - Stop  today  - low-intensity heparin gtt    GI care:   Elevated LFTs - recurrent  GI bleed 2/2 NG trauma, now recurrent secondary to GJ bumper ulcer   Moderate malnutrition in the context of acute on chronic illness  PEG-J, with malpositioned J tube   - Diet: TF   - PPI BID    Fluids/ Electrolytes/ Nutrition:   Hypernatremia  Hyperkalemia, resolved   BL creat appears to be ~ 0.7  - free water flushes  - Goal 0 to -50               - Lasix 40 BID  - Strict I/O, daily weights  - Avoid/limit nephrotoxins as able  - Replete lytes PRN per protocol      Endocrine:    Stress induced hyperglycemia with steroid-induced component, on DM2  Hypothyroidism  RA  Preop A1c 6.6  - Discontinue insulin gtt. High sliding scale insulin    - Goal BG <180 for optimal healing  - continue home synthroid  - lantus 30                - Endocrine consult     - Lantus 50 daily at 1200    ID/ Antibiotics:  Immunosuppression s/p transplant  Candidal infection of donor lungs (likely source), pleural fluid, and fungemia   PNA  - NGTD CSF. Last + blood cx 10/22. No vegetations on valves per TC 10/23  - Nystatin, Acyclovir, bactrim   - Tacrolimus (via g tube), prednisone    - appreciate transplant ID, ophthalmology    - d/w ID, no indication to treat Staph epi in sputum   - Fluconazole for fungemia (re-evaluate on 11/24)   - Zosyn for klebsiella and Pseudomonas for two weeks for coverage    Heme:     Acute blood loss anemia secondary to surgery and GI bleed, and anemia related to critical illness and iron deficiency    Hypogammaglobulinemia s/p IVIG  Thrombocytopenia, HIT negative - resolved   Lactic acidosis, resolved  Nonocclusive R subclavian thrombus    - heparin low intensity  - Start cumadin 16 NOV    Prophylaxis:    - Mechanical prophylaxis for DVT: SCDs  - Chemical DVT prophylaxis: heparin low intensity gtt, start warfarin today  - PPI     Lines/ tubes/ drains:  - trach  - peg-j  - Watson 10/25; replaced 11/14  - PIVs  - chest tube L  - L  PICC     Disposition:  - [facility]    Patient seen, findings and plan discussed with [facility] ICU staff    Aditya Jo  Medical Student    I saw and evaluated this patient with our medical student, Aditya Jo, on 11/19/21. I agree with the note and above plan as edited by me where appropriate.    Tammie Perez MD (PGY-3)  General Surgery    ====================================    SUBJECTIVE:   - NAEO laying comfortably in bed    OBJECTIVE:   1. VITAL SIGNS:   Temp:  [98.1  F (36.7  C)-98.7  F (37.1  C)] 98.7  F (37.1  C)  Pulse:  [53-89] 56  Resp:  [12-21] 14  BP: ()/() 122/71  FiO2 (%):  [40 %] 40 %  SpO2:  [92 %-100 %] 95 %  Ventilation Mode: CMV/AC  (Continuous Mandatory Ventilation/ Assist Control)  FiO2 (%): 40 %  Rate Set (breaths/minute): 12 breaths/min  Tidal Volume Set (mL): 460 mL  PEEP (cm H2O): 6 cmH2O  Pressure Support (cm H2O): (S) 10 cmH2O  Oxygen Concentration (%): 40 %  Peak Inspiratory Pressure (cm H2O) (Drager Chary): 35  Resp: 14      2. INTAKE/ OUTPUT:   I/O last 3 completed shifts:  In: 3122.82 [I.V.:1232.82; NG/GT:810]  Out: 3186 [Urine:2030; Emesis/NG output:180; Stool:900; Chest Tube:76]    3. PHYSICAL EXAMINATION:   General: Alert individual sitting in bed,  Neuro: Alert and Oriented, resting comfortably   Resp: Trach present, breathing comfortably  CV: Intermittent Sinus Pepe  Abdomen: Soft, Non-distended, Non-tender  Incisions: c/d/i  Extremities: warm and well perfused    4. INVESTIGATIONS:   Arterial Blood Gases   No lab results found in last 7 days.  Complete Blood Count   Recent Labs   Lab 11/19/21  0341 11/18/21  0338 11/17/21  0337 11/16/21  0357   WBC 11.1* 13.1* 13.7* 14.3*   HGB 8.0* 8.0* 8.6* 9.5*   * 117* 132* 141*     Basic Metabolic Panel  Recent Labs   Lab 11/19/21  0345 11/19/21  0341 11/18/21  2357 11/18/21  2133 11/18/21  1648 11/18/21  0341 11/18/21  0338 11/17/21 2010 11/17/21  1543   NA  --  139  --   --  136  --  142  --  148*   POTASSIUM  --  4.0  --   --   4.4  --  3.8  --  3.4   CHLORIDE  --  101  --   --  102  --  106  --  107   CO2  --  32  --   --  28  --  28  --  27   BUN  --  47*  --   --  42*  --  48*  --  40*   CR  --  0.68  --   --  0.78  --  0.67  --  0.68   * 160* 168* 209* 201*   < > 168*   < > 200*    < > = values in this interval not displayed.     Liver Function Tests  Recent Labs   Lab 11/19/21 0341 11/18/21 0338 11/17/21 0337 11/16/21 0357   AST 18 14 15 17   ALT 29 28 30 35   ALKPHOS 123 123 150 177*   BILITOTAL 0.2 0.2 0.2 0.2   ALBUMIN 1.9* 1.8* 2.1* 2.1*   INR 1.46* 1.40* 1.23* 1.18*     Pancreatic Enzymes  No lab results found in last 7 days.  Coagulation Profile  Recent Labs   Lab 11/19/21 0341 11/18/21 0338 11/17/21 0337 11/16/21 0357   INR 1.46* 1.40* 1.23* 1.18*         5. RADIOLOGY:   Recent Results (from the past 24 hour(s))   XR Chest Port 1 View    Narrative    EXAM: XR CHEST PORT 1 VIEW  11/18/2021 11:42 AM     HISTORY:  sob       COMPARISON:  11/18/2021 from 0104 hours    FINDINGS:   Frontal radiograph of the chest. Percutaneous tracheostomy tube  projects over the midthoracic trachea. Postsurgical changes of  bilateral lung transplant with intact clam shell sternotomy wires.  Stable left basilar chest tube. Left upper extremity PICC line tip  terminates in the mid SVC.    Trachea is midline. Stable cardiac silhouette. Small bilateral pleural  effusions. Stable bibasilar opacities. No new airspace consolidation.  No pneumothorax.      Impression    IMPRESSION:   1. Stable small bilateral pleural effusions.  2. Grossly unchanged bibasilar atelectasis.    I have personally reviewed the examination and initial interpretation  and I agree with the findings.    DAVIN ANGUIANO MD         SYSTEM ID:  [id number]       =========================================

## 2021-11-19 NOTE — PROGRESS NOTES
Major Shift Events:  NAEON. Pt rested on CMV 40% 12/460/6. Pt follows commands, nods appropriately. Pt seems down and frustrated with the course of his care and that he is taking longer to recuperate. Pt encouraged to stay positive and reminded that he is making progress.    Plan: Continue to PST during the day, OOB to chair.    For vital signs and complete assessments, please see documentation flowsheets.

## 2021-11-19 NOTE — PLAN OF CARE
Major Shift Events:  Pt placed on pressure support trial for 2 hours. Pt began to get increasingly anxious after 2 hours and was taken off pressure support mode. Propofol and fentanyl had to be restarted due to increased agitation and not tolerating ventilator well. Pt was also given PO medication such as seroquel to assist in decreasing anxiety. Pt was up to chair via 2 assist and ceiling lift.   Plan: Wean off propofol, another pressure support trial  For vital signs and complete assessments, please see documentation flowsheets.

## 2021-11-20 ENCOUNTER — APPOINTMENT (OUTPATIENT)
Dept: GENERAL RADIOLOGY | Facility: CLINIC | Age: 56
End: 2021-11-20
Attending: STUDENT IN AN ORGANIZED HEALTH CARE EDUCATION/TRAINING PROGRAM
Payer: COMMERCIAL

## 2021-11-20 LAB
ALBUMIN SERPL-MCNC: 1.8 G/DL (ref 3.4–5)
ALP SERPL-CCNC: 111 U/L (ref 40–150)
ALT SERPL W P-5'-P-CCNC: 23 U/L (ref 0–70)
ANION GAP SERPL CALCULATED.3IONS-SCNC: 12 MMOL/L (ref 3–14)
ANION GAP SERPL CALCULATED.3IONS-SCNC: 7 MMOL/L (ref 3–14)
ANION GAP SERPL CALCULATED.3IONS-SCNC: 7 MMOL/L (ref 3–14)
AST SERPL W P-5'-P-CCNC: 13 U/L (ref 0–45)
BASE EXCESS BLDV CALC-SCNC: 2.3 MMOL/L (ref -7.7–1.9)
BILIRUB SERPL-MCNC: 0.2 MG/DL (ref 0.2–1.3)
BUN SERPL-MCNC: 36 MG/DL (ref 7–30)
BUN SERPL-MCNC: 36 MG/DL (ref 7–30)
BUN SERPL-MCNC: 43 MG/DL (ref 7–30)
C DIFF TOX B STL QL: NEGATIVE
CALCIUM SERPL-MCNC: 7.7 MG/DL (ref 8.5–10.1)
CALCIUM SERPL-MCNC: 7.7 MG/DL (ref 8.5–10.1)
CALCIUM SERPL-MCNC: 8 MG/DL (ref 8.5–10.1)
CHLORIDE BLD-SCNC: 109 MMOL/L (ref 94–109)
CHLORIDE BLD-SCNC: 109 MMOL/L (ref 94–109)
CHLORIDE BLD-SCNC: 112 MMOL/L (ref 94–109)
CO2 SERPL-SCNC: 26 MMOL/L (ref 20–32)
CO2 SERPL-SCNC: 28 MMOL/L (ref 20–32)
CO2 SERPL-SCNC: 28 MMOL/L (ref 20–32)
CREAT SERPL-MCNC: 0.72 MG/DL (ref 0.66–1.25)
CREAT SERPL-MCNC: 0.72 MG/DL (ref 0.66–1.25)
CREAT SERPL-MCNC: 0.76 MG/DL (ref 0.66–1.25)
ERYTHROCYTE [DISTWIDTH] IN BLOOD BY AUTOMATED COUNT: 16.4 % (ref 10–15)
GFR SERPL CREATININE-BSD FRML MDRD: >90 ML/MIN/1.73M2
GLUCOSE BLD-MCNC: 105 MG/DL (ref 70–99)
GLUCOSE BLD-MCNC: 180 MG/DL (ref 70–99)
GLUCOSE BLD-MCNC: 180 MG/DL (ref 70–99)
GLUCOSE BLDC GLUCOMTR-MCNC: 101 MG/DL (ref 70–99)
GLUCOSE BLDC GLUCOMTR-MCNC: 144 MG/DL (ref 70–99)
GLUCOSE BLDC GLUCOMTR-MCNC: 194 MG/DL (ref 70–99)
GLUCOSE BLDC GLUCOMTR-MCNC: 210 MG/DL (ref 70–99)
GLUCOSE BLDC GLUCOMTR-MCNC: 99 MG/DL (ref 70–99)
HCO3 BLDV-SCNC: 28 MMOL/L (ref 21–28)
HCT VFR BLD AUTO: 25.2 % (ref 40–53)
HGB BLD-MCNC: 7.6 G/DL (ref 13.3–17.7)
INR PPP: 2.12 (ref 0.85–1.15)
MAGNESIUM SERPL-MCNC: 1.5 MG/DL (ref 1.6–2.3)
MAGNESIUM SERPL-MCNC: 2 MG/DL (ref 1.6–2.3)
MCH RBC QN AUTO: 30.8 PG (ref 26.5–33)
MCHC RBC AUTO-ENTMCNC: 30.2 G/DL (ref 31.5–36.5)
MCV RBC AUTO: 102 FL (ref 78–100)
O2/TOTAL GAS SETTING VFR VENT: 40 %
PCO2 BLDV: 51 MM HG (ref 40–50)
PH BLDV: 7.36 [PH] (ref 7.32–7.43)
PHOSPHATE SERPL-MCNC: 4.1 MG/DL (ref 2.5–4.5)
PLATELET # BLD AUTO: 78 10E3/UL (ref 150–450)
PO2 BLDV: 36 MM HG (ref 25–47)
POTASSIUM BLD-SCNC: 3.5 MMOL/L (ref 3.4–5.3)
POTASSIUM BLD-SCNC: 3.5 MMOL/L (ref 3.4–5.3)
POTASSIUM BLD-SCNC: 3.6 MMOL/L (ref 3.4–5.3)
PROT SERPL-MCNC: 5.4 G/DL (ref 6.8–8.8)
RBC # BLD AUTO: 2.47 10E6/UL (ref 4.4–5.9)
SARS-COV-2 RNA RESP QL NAA+PROBE: NEGATIVE
SODIUM SERPL-SCNC: 144 MMOL/L (ref 133–144)
SODIUM SERPL-SCNC: 144 MMOL/L (ref 133–144)
SODIUM SERPL-SCNC: 150 MMOL/L (ref 133–144)
UFH PPP CHRO-ACNC: 0.4 IU/ML
WBC # BLD AUTO: 11.2 10E3/UL (ref 4–11)

## 2021-11-20 PROCEDURE — 82803 BLOOD GASES ANY COMBINATION: CPT | Performed by: STUDENT IN AN ORGANIZED HEALTH CARE EDUCATION/TRAINING PROGRAM

## 2021-11-20 PROCEDURE — 250N000011 HC RX IP 250 OP 636: Performed by: STUDENT IN AN ORGANIZED HEALTH CARE EDUCATION/TRAINING PROGRAM

## 2021-11-20 PROCEDURE — 71045 X-RAY EXAM CHEST 1 VIEW: CPT | Mod: 26 | Performed by: RADIOLOGY

## 2021-11-20 PROCEDURE — 999N000157 HC STATISTIC RCP TIME EA 10 MIN

## 2021-11-20 PROCEDURE — 250N000013 HC RX MED GY IP 250 OP 250 PS 637: Performed by: STUDENT IN AN ORGANIZED HEALTH CARE EDUCATION/TRAINING PROGRAM

## 2021-11-20 PROCEDURE — 250N000013 HC RX MED GY IP 250 OP 250 PS 637: Performed by: ANESTHESIOLOGY

## 2021-11-20 PROCEDURE — 85520 HEPARIN ASSAY: CPT | Performed by: THORACIC SURGERY (CARDIOTHORACIC VASCULAR SURGERY)

## 2021-11-20 PROCEDURE — 250N000013 HC RX MED GY IP 250 OP 250 PS 637: Performed by: THORACIC SURGERY (CARDIOTHORACIC VASCULAR SURGERY)

## 2021-11-20 PROCEDURE — 250N000013 HC RX MED GY IP 250 OP 250 PS 637

## 2021-11-20 PROCEDURE — 80048 BASIC METABOLIC PNL TOTAL CA: CPT

## 2021-11-20 PROCEDURE — 250N000012 HC RX MED GY IP 250 OP 636 PS 637: Performed by: PHYSICIAN ASSISTANT

## 2021-11-20 PROCEDURE — 250N000012 HC RX MED GY IP 250 OP 636 PS 637: Performed by: NURSE PRACTITIONER

## 2021-11-20 PROCEDURE — 250N000009 HC RX 250: Performed by: STUDENT IN AN ORGANIZED HEALTH CARE EDUCATION/TRAINING PROGRAM

## 2021-11-20 PROCEDURE — 94640 AIRWAY INHALATION TREATMENT: CPT | Mod: 76

## 2021-11-20 PROCEDURE — 94003 VENT MGMT INPAT SUBQ DAY: CPT

## 2021-11-20 PROCEDURE — 87493 C DIFF AMPLIFIED PROBE: CPT | Performed by: STUDENT IN AN ORGANIZED HEALTH CARE EDUCATION/TRAINING PROGRAM

## 2021-11-20 PROCEDURE — 250N000011 HC RX IP 250 OP 636

## 2021-11-20 PROCEDURE — 84100 ASSAY OF PHOSPHORUS: CPT | Performed by: THORACIC SURGERY (CARDIOTHORACIC VASCULAR SURGERY)

## 2021-11-20 PROCEDURE — 85610 PROTHROMBIN TIME: CPT | Performed by: THORACIC SURGERY (CARDIOTHORACIC VASCULAR SURGERY)

## 2021-11-20 PROCEDURE — 94668 MNPJ CHEST WALL SBSQ: CPT

## 2021-11-20 PROCEDURE — 250N000013 HC RX MED GY IP 250 OP 250 PS 637: Performed by: NURSE PRACTITIONER

## 2021-11-20 PROCEDURE — 200N000002 HC R&B ICU UMMC

## 2021-11-20 PROCEDURE — 250N000011 HC RX IP 250 OP 636: Performed by: THORACIC SURGERY (CARDIOTHORACIC VASCULAR SURGERY)

## 2021-11-20 PROCEDURE — U0005 INFEC AGEN DETEC AMPLI PROBE: HCPCS | Performed by: STUDENT IN AN ORGANIZED HEALTH CARE EDUCATION/TRAINING PROGRAM

## 2021-11-20 PROCEDURE — 85014 HEMATOCRIT: CPT | Performed by: THORACIC SURGERY (CARDIOTHORACIC VASCULAR SURGERY)

## 2021-11-20 PROCEDURE — 99233 SBSQ HOSP IP/OBS HIGH 50: CPT | Performed by: INTERNAL MEDICINE

## 2021-11-20 PROCEDURE — 250N000012 HC RX MED GY IP 250 OP 636 PS 637: Performed by: INTERNAL MEDICINE

## 2021-11-20 PROCEDURE — 83735 ASSAY OF MAGNESIUM: CPT | Performed by: THORACIC SURGERY (CARDIOTHORACIC VASCULAR SURGERY)

## 2021-11-20 PROCEDURE — 94640 AIRWAY INHALATION TREATMENT: CPT

## 2021-11-20 PROCEDURE — 71045 X-RAY EXAM CHEST 1 VIEW: CPT

## 2021-11-20 PROCEDURE — 83735 ASSAY OF MAGNESIUM: CPT | Performed by: STUDENT IN AN ORGANIZED HEALTH CARE EDUCATION/TRAINING PROGRAM

## 2021-11-20 PROCEDURE — 258N000003 HC RX IP 258 OP 636: Performed by: THORACIC SURGERY (CARDIOTHORACIC VASCULAR SURGERY)

## 2021-11-20 RX ORDER — POTASSIUM CHLORIDE 1.5 G/1.58G
40 POWDER, FOR SOLUTION ORAL ONCE
Status: DISCONTINUED | OUTPATIENT
Start: 2021-11-20 | End: 2021-11-20

## 2021-11-20 RX ORDER — POTASSIUM CHLORIDE 1.5 G/1.58G
20 POWDER, FOR SOLUTION ORAL ONCE
Status: COMPLETED | OUTPATIENT
Start: 2021-11-20 | End: 2021-11-20

## 2021-11-20 RX ORDER — MAGNESIUM SULFATE HEPTAHYDRATE 40 MG/ML
2 INJECTION, SOLUTION INTRAVENOUS ONCE
Status: COMPLETED | OUTPATIENT
Start: 2021-11-20 | End: 2021-11-20

## 2021-11-20 RX ORDER — WARFARIN SODIUM 3 MG/1
3 TABLET ORAL
Status: COMPLETED | OUTPATIENT
Start: 2021-11-20 | End: 2021-11-20

## 2021-11-20 RX ADMIN — Medication 1 PACKET: at 08:09

## 2021-11-20 RX ADMIN — MAGNESIUM SULFATE IN WATER 2 G: 40 INJECTION, SOLUTION INTRAVENOUS at 18:37

## 2021-11-20 RX ADMIN — ACETYLCYSTEINE 2 ML: 200 SOLUTION ORAL; RESPIRATORY (INHALATION) at 19:13

## 2021-11-20 RX ADMIN — QUETIAPINE FUMARATE 25 MG: 25 TABLET ORAL at 07:58

## 2021-11-20 RX ADMIN — PIPERACILLIN SODIUM AND TAZOBACTAM SODIUM 4.5 G: 4; .5 INJECTION, POWDER, LYOPHILIZED, FOR SOLUTION INTRAVENOUS at 10:53

## 2021-11-20 RX ADMIN — Medication 1 PACKET: at 14:37

## 2021-11-20 RX ADMIN — NYSTATIN 1000000 UNITS: 500000 SUSPENSION ORAL at 20:55

## 2021-11-20 RX ADMIN — INSULIN GLARGINE 50 UNITS: 100 INJECTION, SOLUTION SUBCUTANEOUS at 12:57

## 2021-11-20 RX ADMIN — QUETIAPINE FUMARATE 25 MG: 25 TABLET ORAL at 20:55

## 2021-11-20 RX ADMIN — ACETYLCYSTEINE 2 ML: 200 SOLUTION ORAL; RESPIRATORY (INHALATION) at 07:53

## 2021-11-20 RX ADMIN — FUROSEMIDE 40 MG: 10 INJECTION, SOLUTION INTRAVENOUS at 08:03

## 2021-11-20 RX ADMIN — INSULIN ASPART 3 UNITS: 100 INJECTION, SOLUTION INTRAVENOUS; SUBCUTANEOUS at 03:14

## 2021-11-20 RX ADMIN — ACETYLCYSTEINE 2 ML: 200 SOLUTION ORAL; RESPIRATORY (INHALATION) at 15:37

## 2021-11-20 RX ADMIN — Medication 1 PACKET: at 07:59

## 2021-11-20 RX ADMIN — CALCIUM CARBONATE 600 MG (1,500 MG)-VITAMIN D3 400 UNIT TABLET 1 TABLET: at 17:35

## 2021-11-20 RX ADMIN — MYCOPHENOLATE MOFETIL 1000 MG: 200 POWDER, FOR SUSPENSION ORAL at 08:11

## 2021-11-20 RX ADMIN — NYSTATIN 1000000 UNITS: 500000 SUSPENSION ORAL at 12:56

## 2021-11-20 RX ADMIN — LEVALBUTEROL HYDROCHLORIDE 1.25 MG: 1.25 SOLUTION RESPIRATORY (INHALATION) at 19:13

## 2021-11-20 RX ADMIN — ACETAZOLAMIDE 500 MG: 500 INJECTION, POWDER, LYOPHILIZED, FOR SOLUTION INTRAVENOUS at 06:23

## 2021-11-20 RX ADMIN — WARFARIN SODIUM 3 MG: 3 TABLET ORAL at 17:35

## 2021-11-20 RX ADMIN — LEVOTHYROXINE SODIUM 25 MCG: 0.03 TABLET ORAL at 07:59

## 2021-11-20 RX ADMIN — LEVALBUTEROL HYDROCHLORIDE 1.25 MG: 1.25 SOLUTION RESPIRATORY (INHALATION) at 15:37

## 2021-11-20 RX ADMIN — MYCOPHENOLATE MOFETIL 1000 MG: 200 POWDER, FOR SUSPENSION ORAL at 21:05

## 2021-11-20 RX ADMIN — PREDNISOLONE 12.5 MG: 15 SOLUTION ORAL at 08:08

## 2021-11-20 RX ADMIN — Medication 1 PACKET: at 20:55

## 2021-11-20 RX ADMIN — PIPERACILLIN SODIUM AND TAZOBACTAM SODIUM 4.5 G: 4; .5 INJECTION, POWDER, LYOPHILIZED, FOR SOLUTION INTRAVENOUS at 21:26

## 2021-11-20 RX ADMIN — POTASSIUM CHLORIDE 40 MEQ: 40 SOLUTION ORAL at 07:59

## 2021-11-20 RX ADMIN — Medication 5 ML: at 03:22

## 2021-11-20 RX ADMIN — HEPARIN SODIUM 900 UNITS/HR: 1000 INJECTION INTRAVENOUS; SUBCUTANEOUS at 03:30

## 2021-11-20 RX ADMIN — PREDNISOLONE 12.5 MG: 15 SOLUTION ORAL at 20:55

## 2021-11-20 RX ADMIN — Medication 10 MG: at 20:55

## 2021-11-20 RX ADMIN — Medication 1 TABLET: at 08:03

## 2021-11-20 RX ADMIN — INSULIN ASPART 3 UNITS: 100 INJECTION, SOLUTION INTRAVENOUS; SUBCUTANEOUS at 12:59

## 2021-11-20 RX ADMIN — INSULIN ASPART 1 UNITS: 100 INJECTION, SOLUTION INTRAVENOUS; SUBCUTANEOUS at 21:21

## 2021-11-20 RX ADMIN — MAGNESIUM SULFATE IN WATER 2 G: 40 INJECTION, SOLUTION INTRAVENOUS at 06:24

## 2021-11-20 RX ADMIN — ESCITALOPRAM 5 MG: 5 TABLET, FILM COATED ORAL at 07:58

## 2021-11-20 RX ADMIN — OXYCODONE HYDROCHLORIDE 5 MG: 5 TABLET ORAL at 02:31

## 2021-11-20 RX ADMIN — PIPERACILLIN SODIUM AND TAZOBACTAM SODIUM 4.5 G: 4; .5 INJECTION, POWDER, LYOPHILIZED, FOR SOLUTION INTRAVENOUS at 03:15

## 2021-11-20 RX ADMIN — SULFAMETHOXAZOLE AND TRIMETHOPRIM 1 TABLET: 400; 80 TABLET ORAL at 07:59

## 2021-11-20 RX ADMIN — NYSTATIN 1000000 UNITS: 500000 SUSPENSION ORAL at 07:59

## 2021-11-20 RX ADMIN — ACETYLCYSTEINE 2 ML: 200 SOLUTION ORAL; RESPIRATORY (INHALATION) at 11:43

## 2021-11-20 RX ADMIN — FUROSEMIDE 40 MG: 10 INJECTION, SOLUTION INTRAVENOUS at 16:22

## 2021-11-20 RX ADMIN — MULTIVIT AND MINERALS-FERROUS GLUCONATE 9 MG IRON/15 ML ORAL LIQUID 15 ML: at 07:59

## 2021-11-20 RX ADMIN — PIPERACILLIN SODIUM AND TAZOBACTAM SODIUM 4.5 G: 4; .5 INJECTION, POWDER, LYOPHILIZED, FOR SOLUTION INTRAVENOUS at 16:26

## 2021-11-20 RX ADMIN — Medication 40 MG: at 20:55

## 2021-11-20 RX ADMIN — TACROLIMUS 2.5 MG: 5 CAPSULE ORAL at 17:35

## 2021-11-20 RX ADMIN — ALBUTEROL SULFATE 2.5 MG: 2.5 SOLUTION RESPIRATORY (INHALATION) at 07:53

## 2021-11-20 RX ADMIN — NYSTATIN 1000000 UNITS: 500000 SUSPENSION ORAL at 16:28

## 2021-11-20 RX ADMIN — FLUCONAZOLE IN SODIUM CHLORIDE 400 MG: 2 INJECTION, SOLUTION INTRAVENOUS at 12:54

## 2021-11-20 RX ADMIN — TACROLIMUS 2 MG: 5 CAPSULE ORAL at 08:11

## 2021-11-20 RX ADMIN — POTASSIUM CHLORIDE 20 MEQ: 1.5 POWDER, FOR SOLUTION ORAL at 06:24

## 2021-11-20 RX ADMIN — LEVALBUTEROL HYDROCHLORIDE 1.25 MG: 1.25 SOLUTION RESPIRATORY (INHALATION) at 11:43

## 2021-11-20 RX ADMIN — CALCIUM CARBONATE 600 MG (1,500 MG)-VITAMIN D3 400 UNIT TABLET 1 TABLET: at 07:59

## 2021-11-20 RX ADMIN — Medication 5 ML: at 03:21

## 2021-11-20 RX ADMIN — Medication 40 MG: at 08:09

## 2021-11-20 RX ADMIN — ROSUVASTATIN CALCIUM 10 MG: 10 TABLET, FILM COATED ORAL at 07:59

## 2021-11-20 ASSESSMENT — ACTIVITIES OF DAILY LIVING (ADL)
ADLS_ACUITY_SCORE: 24
ADLS_ACUITY_SCORE: 24
ADLS_ACUITY_SCORE: 25
ADLS_ACUITY_SCORE: 25
ADLS_ACUITY_SCORE: 24
ADLS_ACUITY_SCORE: 25
ADLS_ACUITY_SCORE: 25
ADLS_ACUITY_SCORE: 24
ADLS_ACUITY_SCORE: 25
ADLS_ACUITY_SCORE: 24
ADLS_ACUITY_SCORE: 25
ADLS_ACUITY_SCORE: 25
ADLS_ACUITY_SCORE: 24

## 2021-11-20 ASSESSMENT — MIFFLIN-ST. JEOR: SCORE: 1400.08

## 2021-11-20 NOTE — PROGRESS NOTES
Pulmonary Medicine  Cystic Fibrosis - Lung Transplant Team  Progress Note  2021       Patient: Edson Thornton  MRN: [id number]  : 1965 (age 56 year old)  Transplant: 10/16/2021 (Lung), POD#35  Admission date: 2021    Assessment & Plan:     Edson Thornton is a 56 year old male with a PMH significant for NSIP/ILD, bronchiectasis, moderate PH, RA, SALTY, chronic HSV infection, hypogammaglobulinemia, steroid-induced diabetes, hypothyroidism, PFO, HTN, HLD, duodenal anomaly, anxiety, and depression.  Admitted on 21 from OSH for acute on chronic respiratory failure 2/2 ILD exacerbation, now s/p BSLT on 10/16/21.  Prolonged vent wean s/p trach and PEG/J tube placement with thoracic surgery 10/29.  Post-op course otherwise complicated by encephalopathy and diffuse weakness, acute to subacute CVA, afib with RVR, BRENNAN, GI bleed, Candidemia/Candida empyema, and anxiety.  Code called  for bradycardia/asystole, progressive hypotensive, found to have significant GI bleed, EGD with adherent clot near PEG tube site that was clipped.  Tolerating PS/TD trials and with ongoing improvement in mentation and weakness.      Today's recommendations:    - TD/PS trials during the day per ICU team otherwise CMV overnight   --will consider changing to a bipap machine tomorrow for overnight support  - Diuresis per ICU team  - Tacrolimus repeat level  (ordered)  - Prednisone taper ordered   - Coccidioides Ag,  serum pending  - Continue Zosyn for 2 week course through   - Follow pending pleural fungal cultures and fungal/bacterial blood cultures from    -NGTD  - Continue fluconazole (reevaluate around , repeat fungal cultures pending)   - EKG weekly for QTc monitoring (, ordered)  - Will need repeat imaging to ensure left pleural effusion almost entirely resolved before removing chest tube and concluding fluconazole, reevaluate around   - Repeat IgG (12/15,  ordered)  - DSA monitoring q2 weeks, (11/29) ordered  - agree with discontinuing heparin given thrombocytopenia     S/p BSLT for ILD:  Acute hypoxic respiratory failure s/p trach:   Bilateral pleural effusions: Unfortunately had not received vaccination for flu, PNA, or COVID-19 PTA.  Explant pathology with NSIP, no malignancy.  PGD 2-3.  Weaned off paralytic 10/19 (for vent dyssynchrony) and Caroline 10/22.  Initial difficulty weaning sedation given agitation then with neurological findings as below.  Prolonged vent wean s/p trach and PEG/J tube placement with thoracic surgery 10/29.  Code called 11/2 for bradycardia/asystole (required 1 round of CPR, no medications) then progressively hypotensive, GI bleed as below.  Chest CT 11/2 with increased bilateral pleural effusions (moderate left, small right), bibasilar atelectasis with area of hypoenhancing parenchyma in LLL (suspicious for infection), and numerous nondisplaced anterior rib fractures bilaterally.  S/p left chest tube placement in IR 11/3 for pleural effusion/empyema (as below), right deferred given very little effusion on US.  Problems with trach cuff leak 11/3 (requiring multiple exchanges), bronch 11/14 with trach in good position with cuff well sealed in trachea and small to moderate secretions mostly in lower lobes.  Doing well on PST and TD  - Nebs: levalbuterol and Mucomyst QID  - Aggressive pulmonary toilet with chest physiotherapy QID  - TD/PS trials during the day per ICU team otherwise CMV overnight  - Diuresis per ICU team  - CXR reviewed by me today     Immunosuppression: s/p induction therapy with basiliximab 10/16 (and high dose IV steroid) and 10/20  - Tacrolimus 2 mg BID (suspension, via G tube).  Goal level 8-12. Repeat level 11/22 (ordered)  - MMF 1000 mg BID (11/2, decreased given GI bleed, AZA to be avoided given TPMT)  - Prednisolone 12.5 mg BID, next taper due 11/21 (ordered)  Date AM dose (mg) PM dose (mg)   11/7/21 12.5 12.5   11/21/21  12.5 10   12/5/21 10 10   1/2/22 10 7.5   1/30/22 7.5 7.5   2/27/22 7.5 5   3/27/22 5 5   4/24/22 5 2.5      Prophylaxis:   - Bactrim for PJP ppx (held 11/2-11/5 d/t BRENNAN)  - Nystatin for oral candidiasis ppx, 6 month course  - See below for serologies and viral ppx:    Donor Recipient Intervention   CMV status Negative Negative None, CMV monthly (due 12/14, neg 11/16)   EBV status Positive Positive None, EBV monthly (due 12/14, neg 11/16)   HSV status N/A Positive S/p acyclovir POD #1-30 (recent infection history pre-txp)      ID: Concern for possible Strongyloides exposure pre-transplant s/p ivermectin x1 dose (9/17).  Donor and recipient cultures NGTD.  S/p IV ceftazidime/vancomycin for 48h per protocol and additional empiric ceftazidime 10/19-10/23 given recurrent fevers as below.  Cryptococcal Ag negative 10/23.  - Monthly Coccidioides Ag x12 months post-transplant per ID (urine and serum negative 10/17), 11/17 urine neg, serum pending     Klebsiella pneumoniae:  Pseudomonas fluorescens/putida: Klebsiella initially noted trach sputum culture 11/10.  Started on ceftriaxone 11/11, transitioned to ertapenem 11/12-11/13, and back to ceftriaxone 11/14.  Bronch culture 11/12 with Klebsiella and Pseudomonas fluorescens/putida (R-meropenem).     - ABX: Zosyn (11/15) for 2 week course through 11/29     Recurring fevers, Resolved:  Fevers post-op, Tmax 101.7 POD #1.  Febrile with worsening leukocytosis again 10/25, generally persisting.  LP (10/29), xanthochromic with pleocytosis thought to be appropriate given RBC and WBCs, no ABX recommended per transplant ID and neurology.  S/p empiric meropenem 10/28-11/2.  Now afebrile, ABX as above.     Disseminated Candida: Noted on blood cultures 10/20 and 10/22.  BDG fungitell positive (399) on 10/20.  Respiratory cultures with persistent Candida albicans.  TC 10/23 without evidence of endocarditis.  Ophthalmology consult 10/24 with benign dilated fundoscopic exam.  Candida  empyema also noted 10/25, chest tubes inadvertently removed by CVTS 10/28, left chest tube replaced by IR 11/3 as above with ongoing Candida on cultures.   - Repeat pleural fungal cultures and fungal/bacterial blood cultures (11/18), NGTD  - Fluconazole (10/26 per transplant ID, prior micafungin 10/22-10/27), repeat EKG for QTc monitoring 11/23 (ordered), LFTs as below (see transplant ID note 11/5). Will need repeat imaging to ensure left pleural effusion almost entirely resolved before removing chest tube and concluding fluconazole, reevaluate around 11/22      HSV: Chronic intermittent active infection pre-transplant with recent HSV infection: crusted lesions throughout left side of jaw, s/p 10 day treatment course of ACV through 10/9.  HSV PCR blood negative 10/17.  S/p ACV ppx as above (started POD #1 instead of POD #8 given HSV history and location).     Hypogammaglobulinemia: IgG previously low at 364 (9/7).  Noted at 265 at time of transplant, s/p IVIG 10/21, repeat IgG (11/17) 198, s/p IVIG (with premedication) 11/18.  - Repeat IgG (12/15, ordered)     Positive cross match: Note that he received two doses of rituximab in June, which is likely contributing to cross match result.  DSA most recently negative 11/15.  - DSA monitoring q2 weeks, (11/29) ordered     SALTY: Noted pre-transplant.  Home CPAP 6-12 cm H2O.  - Evaluate need for BiPAP after decannulation.     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  MRI 10/23 with multifocal subacute infarcts within both cerebral hemispheres and left cerebellum.  DDx include surgery v embolic v infectious.  Heparin drip started 10/23 (intermittently held with GI bleed as below).  Repeat stroke code  10/25 d/t marked decrease in responsiveness with sedation wean, pupil inequality, and absent gag reflex.  CTA head without obvious new pathology, MRI brain (with and without contrast) primarily revealing for infarct, low likelihood of PRES.  Ammonia normal.  VEEG per neuro with severe diffuse encephalopathy.  Gradual improvement noted since 10/29, repeat head CT 11/2 (following code) without new acute intracranial abnormalities.  - AC management per primary team as below  - PT/OT     Afib with RVR: Noted 10/18, started on amiodarone drip and converted to SR, Transitioned to PO 10/21, dose decreased 10/29--discontinued 11/19. AC as above.   --off metoprolol and amio because of intermittent bradycardia     Right subclavian DVT: Seen on UE US from 11/4.  Heparin drip resumed 11/5.  Heparin stopped 11/20 because of therapeutic INR on warfarin and thrombocytopenia      Recurrent GI bleed, Resolved: Hemoglobin dropped to 6.6 10/22, s/p 2 units pRBC.  OG tube with bloody output, EGD 10/23 noted NJ/OG tube trauma with scant oozing.  Progressive hypotension 11/2, hemoglobin 5.8 with bloody G tube output.  Heparin drip held, transitioned to PPI drip, and MTP activated.  EGD 11/2 with large amount of clotted blood in stomach and area of raised mucosa with small adherent clot near PEG tube site that was clipped, no active bleeding.  Maroon stools 11/3, repeat EGD with extensive old blood in stomach, no active bleeding, small nodular area with prior clips clipped again.  Most recent EGD 11/5 with ulcer noted at PEG tube bumper site, gastritis, and suction marks from G tube. TF noted in G tube drainage bag 11/7, AXR with GJ tube tip projecting over proximal duodenum. GJ tube exchanged 11/9 by GI.      Elevated LFTs: Shock liver post-op.  Now resolved.    --Intermittent alk phos elevation      Hypomagnesemia: Suppressed reabsorption 2/2 CNI.  Requiring nearly daily IV replacement.  - Will start PO magnesium supplementation      We  "appreciate the excellent care provided by the CVICU and CVTS teams.  Recommendations communicated via in person rounding and this note.  Will continue to follow along closely, please do not hesitate to call with any questions or concerns.    Brittany Wells MD MPH  Associate Professor of Medicine  Pager [phone]       Subjective & Interval History:     Patient feels comfortable on TD this am.  Denies chest pain.  Some cough.  Limited interview given trach and mental status    Review of Systems:     C: No fever, no chills, receiving tube feeds  ENT/MOUTH: no sinus pain, no nasal congestion or drainage  RESP: See interval history  CV: No chest pain  GI: No nausea, no vomiting, no change in stools, no reflux symptoms  : metzger in place  NEURO: No headache  PSYCHIATRIC: Mood stable    Physical Exam:     Vital signs:  Temp: 98  F (36.7  C) Temp src: Oral BP: 118/83 Pulse: 94   Resp: 18 SpO2: 100 % O2 Device: Trach dome Oxygen Delivery: 40 LPM Height: 162.6 cm (5' 4\") Weight: 65.9 kg (145 lb 4.8 oz)  I/O:     Intake/Output Summary (Last 24 hours) at 11/20/2021 1424  Last data filed at 11/20/2021 1300  Gross per 24 hour   Intake 3360.5 ml   Output 4820 ml   Net -1459.5 ml     Constitutional: lying in bed, in no apparent distress.   HEENT: Eyes with pink conjunctivae, anicteric.  Oral mucosa dry  without lesions.  Trach in place.   PULM: Good air flow bilaterally.  No crackles, no rhonchi, no wheezes.  Well healed clamshell  CV: Normal S1 and S2.  RRR.  No murmur, gallop, or rub.  No peripheral edema.   ABD: NABS, soft, nontender, nondistended.  GJ tube  MSK: Moves all extremities.  + apparent muscle wasting.   NEURO: Somnolent but interactive.   SKIN: Warm, dry.  Scattered ecchymosis  PSYCH: Mood stable.     Lines, Drains, and Devices:  Peripheral IV 11/02/21 Anterior;Right Upper forearm (Active)   Site Assessment WDL 11/20/21 1200   Line Status Saline locked 11/20/21 1200   Dressing Intervention Dressing " reinforced 11/08/21 2000   Phlebitis Scale 0-->no symptoms 11/20/21 1200   Infiltration Scale 0 11/20/21 1200   Infiltration Site Treatment Method  None 11/20/21 0800   Number of days: 18       PICC Triple Lumen 11/04/21 Left Basilic Access. PICC okay to use. (Active)   Site Assessment WDL 11/20/21 1200   External Cath Length (cm) 1 cm 11/04/21 1747   Extremity Circumference (cm) 28 cm 11/04/21 1747   Dressing Intervention Chlorhexidine patch;Transparent 11/20/21 1200   Dressing Change Due 11/21/21 11/20/21 1200   Mosley - Status heparin locked 11/20/21 1200   Mosley - Cap Change Due 11/23/21 11/20/21 1200   Red - Status infusing 11/20/21 1200   Red - Cap Change Due 11/23/21 11/20/21 1200   White - Status heparin locked 11/20/21 1200   White - Cap Change Due 11/23/21 11/20/21 1200   Extravasation? No 11/17/21 2000   Line Necessity Yes, meets criteria 11/20/21 1200   Number of days: 16     Data:     LABS    CMP:   Recent Labs   Lab 11/20/21  1253 11/20/21  0807 11/20/21  0318 11/20/21  0313 11/19/21  2040 11/19/21  1635 11/19/21  0345 11/19/21  0341 11/18/21  2133 11/18/21  1648 11/18/21  0341 11/18/21  0338 11/17/21  2010 11/17/21  1543 11/17/21  0743 11/17/21  0337   NA  --   --  144  144  --   --  140  --  139  --  136  --  142  --  148*  --  140   POTASSIUM  --   --  3.5  3.5  --   --  4.3  --  4.0  --  4.4  --  3.8  --  3.4  --  4.2   CHLORIDE  --   --  109  109  --   --  104  --  101  --  102  --  106  --  107  --  103   CO2  --   --  28  28  --   --  31  --  32  --  28  --  28  --  27  --  30   ANIONGAP  --   --  7  7  --   --  5  --  6  --  6  --  8  --  14  --  7   * 101* 180*  180* 194*   < > 174*   < > 160*   < > 201*   < > 168*   < > 200*   < > 210*   BUN  --   --  36*  36*  --   --  41*  --  47*  --  42*  --  48*  --  40*  --  45*   CR  --   --  0.72  0.72  --   --  0.72  --  0.68  --  0.78  --  0.67  --  0.68  --  0.77   GFRESTIMATED  --   --  >90  >90  --   --  >90  --  >90  --  >90  --   >90  --  >90  --  >90   ERIK  --   --  7.7*  7.7*  --   --  8.6  --  8.5  --  7.9*  --  7.8*  --  7.8*  --  8.7   MAG  --   --  1.5*  --   --   --   --  1.6  --   --   --  2.2  --  1.9  --  1.8   PHOS  --   --  4.1  --   --   --   --  3.6  --   --   --  3.5  --   --   --  4.2   PROTTOTAL  --   --  5.4*  --   --   --   --  5.6*  --   --   --  4.9*  --   --   --  5.3*   ALBUMIN  --   --  1.8*  --   --   --   --  1.9*  --   --   --  1.8*  --   --   --  2.1*   BILITOTAL  --   --  0.2  --   --   --   --  0.2  --   --   --  0.2  --   --   --  0.2   ALKPHOS  --   --  111  --   --   --   --  123  --   --   --  123  --   --   --  150   AST  --   --  13  --   --   --   --  18  --   --   --  14  --   --   --  15   ALT  --   --  23  --   --   --   --  29  --   --   --  28  --   --   --  30    < > = values in this interval not displayed.     CBC:   Recent Labs   Lab 11/20/21 0318 11/19/21 0341 11/18/21 0338 11/17/21 0337   WBC 11.2* 11.1* 13.1* 13.7*   RBC 2.47* 2.58* 2.58* 2.79*   HGB 7.6* 8.0* 8.0* 8.6*   HCT 25.2* 25.0* 25.5* 27.6*   * 97 99 99   MCH 30.8 31.0 31.0 30.8   MCHC 30.2* 32.0 31.4* 31.2*   RDW 16.4* 16.7* 17.2* 17.1*   PLT 78* 100* 117* 132*       INR:   Recent Labs   Lab 11/20/21  0318 11/19/21  0341 11/18/21  0338 11/17/21  0337   INR 2.12* 1.46* 1.40* 1.23*       Glucose:   Recent Labs   Lab 11/20/21  1253 11/20/21  0807 11/20/21  0318 11/20/21  0313 11/19/21  2318 11/19/21  2040   * 101* 180*  180* 194* 148* 133*       Blood Gas:   Recent Labs   Lab 11/20/21  0324 11/19/21  1509 11/19/21  1024   PHV 7.36 7.39 7.42   PCO2V 51* 48 54*   PO2V 36 36 33   HCO3V 28 29* 35*   LORI 2.3* 3.1* 9.0*   O2PER 40 40 40       Culture Data No results for input(s): CULT in the last 168 hours.    Virology Data:   Lab Results   Component Value Date    FLUAH1 Negative 11/12/2021    FLUAH3 Negative 11/12/2021    [id number] Negative 11/12/2021    IFLUB Negative 11/12/2021    RSVA Negative 11/12/2021    RSVB Negative  11/12/2021    PIV1 Negative 11/12/2021    PIV2 Negative 11/12/2021    PIV3 Negative 11/12/2021    HMPV Negative 11/12/2021    HRVS Negative 11/12/2021    ADVBE Negative 11/12/2021    ADVC Negative 11/12/2021    ADVC Negative 10/17/2021       Historical CMV results (last 3 of prior testing):  Lab Results   Component Value Date    CMVQNT Not Detected 11/16/2021    CMVQNT Not Detected 11/12/2021    CMVQNT Not Detected 10/26/2021     No results found for: CMVLOG    Urine Studies    Recent Labs   Lab Test 11/01/21  1336 10/25/21  1507   URINEPH 5.5 5.5   NITRITE Negative Negative   LEUKEST Negative Negative   WBCU 0 2       Most Recent [facility] Pulmonary Function Testing (FVC/FEV1 only)  No results found for: 20002  No results found for: 20003  No results found for: 20015  No results found for: 20016    IMAGING    Recent Results (from the past 48 hour(s))   XR Chest Port 1 View    Narrative    Exam: XR CHEST PORT 1 VIEW, 11/19/2021 1:05 AM    Comparison: Chest x-ray 11/18/2021    History: s/p lung transplant    Findings:  Single portable AP supine projection of the chest is obtained.  Tracheostomy tube tip is in the mid thoracic trachea. Left upper chest  PICC tip at superior cavoatrial junction. Left basilar chest tube in  place. Postoperative changes of bilateral lung transplantation,  clamshell sternotomy wires are intact.    Trachea is midline. Mediastinum is within normal limits.  Cardiopulmonary silhouette is within normal limits. Slight reduction  in left basilar pleural effusion and associated atelectasis. Slightly  reduced right-sided pleural effusion.. No pneumothorax. The upper  abdomen is unremarkable.      Impression    Impression:   1. Slight reduction in left basilar pleural effusion and associated  atelectasis with chest tube placed.  2. Slightly reduced right-sided pleural effusion    I have personally reviewed the examination and initial interpretation  and I agree with the findings.    KRISTA SON MD          SYSTEM ID:  [id number]   XR Chest Port 1 View    Narrative    Exam: XR CHEST PORT 1 VIEW, 11/20/2021 3:22 AM    Comparison: Chest x-ray 11/19/2021    History: s/p lung transplant    Findings:  Single portable AP semiupright view of the chest is obtained.  Postoperative changes of bilateral lung transplantation, clamshell  sternotomy wires are intact. Tracheostomy tube tip is in the mid  thoracic trachea. Left basilar chest tube in place. Left upper  extremity PICC tip terminates at the superior cavoatrial junction.    Trachea is midline. Mediastinum is within normal limits.  Cardiopulmonary silhouette is within normal limits. Stable appearance  of left basilar pleural effusion and associated atelectasis. Unchanged  right basilar atelectasis. No pneumothorax. The upper abdomen is  unremarkable.      Impression    Impression:   1. Unchanged left-sided pleural effusion and associated atelectasis  with chest tube in place.  2. Slightly reduced right-sided pleural effusion and associated  atelectasis..    I have personally reviewed the examination and initial interpretation  and I agree with the findings.    ANUPAM GASTON DO         SYSTEM ID:  [id number]

## 2021-11-20 NOTE — PLAN OF CARE
"Pertinent PMH: \"Woody\" is being followed by CVTS who was admitted on 09/05 for ILD complications now s/p BSLT on 10/16 c/b CVA encephalopathy, acute resp failure, BRENNAN, multiple fungal lung infections, pleural effusion s/p CT and 11/12 PEA arrest d/t GIB s/p MTP.  Major Shift Events: Responds to yes/no questions, follows commands, neuro intact, moves all extremities. Oxycodone 5mg administered X1 for pain. CMV-AC settings overnight 12/40%/460/6 c/ thick red recretions. SR/ST on tele. VSS, MAP . 75 mL/Hr UOP, TF at 60mL/Hr c/ Q4Hr 50 free H2O, rectal tube OP of 450, minimal CT OP. Heparin at 900 Units/Hr last 10A was therapeutic.  Plan: Pressure support/trach dome repeat, ambulate to chair. Transition to floor/LTAC when weaned from vent settings completely. Continue to monitor.    For complete vital signs, hemodynamics, medications/drips, and complete assessments please see documentation flowsheets.    "

## 2021-11-20 NOTE — PLAN OF CARE
Major Shift Events: Pt following commands, nods head to questions appropriately, etc. SR. VSS, occasionally slightly hypertensive. Trach domed most of shift. Thick red secretions via trach. IVP Lasix with excellent response. See flowsheets for UOP + stool output. No output via CT. Heparin gtt d/ c. Up to chair x1. Pt daughter at beside.  Plan: Continue to monitor pt, respiratory status, etc. Plan to transition out of ICU when weaned from vent?  For vital signs and complete assessments, please see documentation flowsheets.

## 2021-11-20 NOTE — PLAN OF CARE
Major Shift Events: Off propofol and fentanyl. Utilizing PRN Seroquel, atarax, tylenol, and oxycodone. PST 8/6 from 0800 - 1200 when switched to TD 40% and 50 lpm which patient has tolerated all shift. Plan to transition back to CMV for overnight. Continues on heparin at 900 units/hour.   Plan: Continue with POC & update primary team with changes.   For vital signs and complete assessments, please see documentation flowsheets.

## 2021-11-21 ENCOUNTER — APPOINTMENT (OUTPATIENT)
Dept: GENERAL RADIOLOGY | Facility: CLINIC | Age: 56
End: 2021-11-21
Attending: STUDENT IN AN ORGANIZED HEALTH CARE EDUCATION/TRAINING PROGRAM
Payer: COMMERCIAL

## 2021-11-21 LAB
ALBUMIN SERPL-MCNC: 2.4 G/DL (ref 3.4–5)
ALP SERPL-CCNC: 133 U/L (ref 40–150)
ALT SERPL W P-5'-P-CCNC: 29 U/L (ref 0–70)
ANION GAP SERPL CALCULATED.3IONS-SCNC: 6 MMOL/L (ref 3–14)
ANION GAP SERPL CALCULATED.3IONS-SCNC: 6 MMOL/L (ref 3–14)
AST SERPL W P-5'-P-CCNC: 20 U/L (ref 0–45)
BASE EXCESS BLDV CALC-SCNC: 3.1 MMOL/L (ref -7.7–1.9)
BILIRUB SERPL-MCNC: 0.2 MG/DL (ref 0.2–1.3)
BUN SERPL-MCNC: 55 MG/DL (ref 7–30)
BUN SERPL-MCNC: 55 MG/DL (ref 7–30)
CALCIUM SERPL-MCNC: 9 MG/DL (ref 8.5–10.1)
CALCIUM SERPL-MCNC: 9 MG/DL (ref 8.5–10.1)
CHLORIDE BLD-SCNC: 106 MMOL/L (ref 94–109)
CHLORIDE BLD-SCNC: 106 MMOL/L (ref 94–109)
CO2 SERPL-SCNC: 29 MMOL/L (ref 20–32)
CO2 SERPL-SCNC: 29 MMOL/L (ref 20–32)
CREAT SERPL-MCNC: 0.86 MG/DL (ref 0.66–1.25)
CREAT SERPL-MCNC: 0.86 MG/DL (ref 0.66–1.25)
ERYTHROCYTE [DISTWIDTH] IN BLOOD BY AUTOMATED COUNT: 16.4 % (ref 10–15)
GFR SERPL CREATININE-BSD FRML MDRD: >90 ML/MIN/1.73M2
GFR SERPL CREATININE-BSD FRML MDRD: >90 ML/MIN/1.73M2
GLUCOSE BLD-MCNC: 204 MG/DL (ref 70–99)
GLUCOSE BLD-MCNC: 204 MG/DL (ref 70–99)
GLUCOSE BLDC GLUCOMTR-MCNC: 109 MG/DL (ref 70–99)
GLUCOSE BLDC GLUCOMTR-MCNC: 130 MG/DL (ref 70–99)
GLUCOSE BLDC GLUCOMTR-MCNC: 152 MG/DL (ref 70–99)
GLUCOSE BLDC GLUCOMTR-MCNC: 166 MG/DL (ref 70–99)
GLUCOSE BLDC GLUCOMTR-MCNC: 168 MG/DL (ref 70–99)
GLUCOSE BLDC GLUCOMTR-MCNC: 187 MG/DL (ref 70–99)
GLUCOSE BLDC GLUCOMTR-MCNC: 195 MG/DL (ref 70–99)
HCO3 BLDV-SCNC: 29 MMOL/L (ref 21–28)
HCT VFR BLD AUTO: 30.1 % (ref 40–53)
HGB BLD-MCNC: 9.2 G/DL (ref 13.3–17.7)
HOLD SPECIMEN: NORMAL
INR PPP: 2.95 (ref 0.85–1.15)
MAGNESIUM SERPL-MCNC: 2.7 MG/DL (ref 1.6–2.3)
MCH RBC QN AUTO: 30.5 PG (ref 26.5–33)
MCHC RBC AUTO-ENTMCNC: 30.6 G/DL (ref 31.5–36.5)
MCV RBC AUTO: 100 FL (ref 78–100)
O2/TOTAL GAS SETTING VFR VENT: 40 %
OXYHGB MFR BLDV: 59 % (ref 70–75)
PCO2 BLDV: 49 MM HG (ref 40–50)
PF4 HEPARIN CMPLX AB SER QL: NEGATIVE
PH BLDV: 7.38 [PH] (ref 7.32–7.43)
PHOSPHATE SERPL-MCNC: 4.1 MG/DL (ref 2.5–4.5)
PLATELET # BLD AUTO: 86 10E3/UL (ref 150–450)
PO2 BLDV: 34 MM HG (ref 25–47)
POTASSIUM BLD-SCNC: 3.8 MMOL/L (ref 3.4–5.3)
POTASSIUM BLD-SCNC: 3.8 MMOL/L (ref 3.4–5.3)
PROT SERPL-MCNC: 6.4 G/DL (ref 6.8–8.8)
RBC # BLD AUTO: 3.02 10E6/UL (ref 4.4–5.9)
SODIUM SERPL-SCNC: 141 MMOL/L (ref 133–144)
SODIUM SERPL-SCNC: 141 MMOL/L (ref 133–144)
WBC # BLD AUTO: 12.2 10E3/UL (ref 4–11)

## 2021-11-21 PROCEDURE — 71045 X-RAY EXAM CHEST 1 VIEW: CPT | Mod: 26 | Performed by: STUDENT IN AN ORGANIZED HEALTH CARE EDUCATION/TRAINING PROGRAM

## 2021-11-21 PROCEDURE — 250N000013 HC RX MED GY IP 250 OP 250 PS 637: Performed by: NURSE PRACTITIONER

## 2021-11-21 PROCEDURE — 250N000013 HC RX MED GY IP 250 OP 250 PS 637: Performed by: THORACIC SURGERY (CARDIOTHORACIC VASCULAR SURGERY)

## 2021-11-21 PROCEDURE — 250N000013 HC RX MED GY IP 250 OP 250 PS 637: Performed by: STUDENT IN AN ORGANIZED HEALTH CARE EDUCATION/TRAINING PROGRAM

## 2021-11-21 PROCEDURE — 250N000012 HC RX MED GY IP 250 OP 636 PS 637: Performed by: PHYSICIAN ASSISTANT

## 2021-11-21 PROCEDURE — 80048 BASIC METABOLIC PNL TOTAL CA: CPT

## 2021-11-21 PROCEDURE — 84100 ASSAY OF PHOSPHORUS: CPT | Performed by: THORACIC SURGERY (CARDIOTHORACIC VASCULAR SURGERY)

## 2021-11-21 PROCEDURE — 250N000013 HC RX MED GY IP 250 OP 250 PS 637: Performed by: ANESTHESIOLOGY

## 2021-11-21 PROCEDURE — 250N000011 HC RX IP 250 OP 636

## 2021-11-21 PROCEDURE — 200N000002 HC R&B ICU UMMC

## 2021-11-21 PROCEDURE — 250N000009 HC RX 250: Performed by: STUDENT IN AN ORGANIZED HEALTH CARE EDUCATION/TRAINING PROGRAM

## 2021-11-21 PROCEDURE — 94640 AIRWAY INHALATION TREATMENT: CPT | Mod: 76

## 2021-11-21 PROCEDURE — 83735 ASSAY OF MAGNESIUM: CPT | Performed by: THORACIC SURGERY (CARDIOTHORACIC VASCULAR SURGERY)

## 2021-11-21 PROCEDURE — 85014 HEMATOCRIT: CPT | Performed by: THORACIC SURGERY (CARDIOTHORACIC VASCULAR SURGERY)

## 2021-11-21 PROCEDURE — 86022 PLATELET ANTIBODIES: CPT | Performed by: STUDENT IN AN ORGANIZED HEALTH CARE EDUCATION/TRAINING PROGRAM

## 2021-11-21 PROCEDURE — 94668 MNPJ CHEST WALL SBSQ: CPT

## 2021-11-21 PROCEDURE — 250N000011 HC RX IP 250 OP 636: Performed by: STUDENT IN AN ORGANIZED HEALTH CARE EDUCATION/TRAINING PROGRAM

## 2021-11-21 PROCEDURE — 71045 X-RAY EXAM CHEST 1 VIEW: CPT

## 2021-11-21 PROCEDURE — 94003 VENT MGMT INPAT SUBQ DAY: CPT

## 2021-11-21 PROCEDURE — 250N000012 HC RX MED GY IP 250 OP 636 PS 637: Performed by: INTERNAL MEDICINE

## 2021-11-21 PROCEDURE — 82805 BLOOD GASES W/O2 SATURATION: CPT

## 2021-11-21 PROCEDURE — 85610 PROTHROMBIN TIME: CPT | Performed by: THORACIC SURGERY (CARDIOTHORACIC VASCULAR SURGERY)

## 2021-11-21 PROCEDURE — 99233 SBSQ HOSP IP/OBS HIGH 50: CPT | Performed by: INTERNAL MEDICINE

## 2021-11-21 PROCEDURE — 999N000157 HC STATISTIC RCP TIME EA 10 MIN

## 2021-11-21 PROCEDURE — 250N000012 HC RX MED GY IP 250 OP 636 PS 637: Performed by: NURSE PRACTITIONER

## 2021-11-21 PROCEDURE — 94640 AIRWAY INHALATION TREATMENT: CPT

## 2021-11-21 PROCEDURE — 250N000013 HC RX MED GY IP 250 OP 250 PS 637

## 2021-11-21 PROCEDURE — 99233 SBSQ HOSP IP/OBS HIGH 50: CPT | Mod: 24 | Performed by: STUDENT IN AN ORGANIZED HEALTH CARE EDUCATION/TRAINING PROGRAM

## 2021-11-21 RX ORDER — FUROSEMIDE 10 MG/ML
40 INJECTION INTRAMUSCULAR; INTRAVENOUS DAILY
Status: DISCONTINUED | OUTPATIENT
Start: 2021-11-22 | End: 2021-11-22

## 2021-11-21 RX ORDER — POTASSIUM CHLORIDE 29.8 MG/ML
20 INJECTION INTRAVENOUS ONCE
Status: COMPLETED | OUTPATIENT
Start: 2021-11-21 | End: 2021-11-21

## 2021-11-21 RX ADMIN — LEVALBUTEROL HYDROCHLORIDE 1.25 MG: 1.25 SOLUTION RESPIRATORY (INHALATION) at 15:39

## 2021-11-21 RX ADMIN — OXYCODONE HYDROCHLORIDE 5 MG: 5 TABLET ORAL at 03:24

## 2021-11-21 RX ADMIN — HYDROXYZINE HYDROCHLORIDE 50 MG: 50 TABLET, FILM COATED ORAL at 18:34

## 2021-11-21 RX ADMIN — ACETYLCYSTEINE 2 ML: 200 SOLUTION ORAL; RESPIRATORY (INHALATION) at 09:02

## 2021-11-21 RX ADMIN — ROSUVASTATIN CALCIUM 10 MG: 10 TABLET, FILM COATED ORAL at 07:36

## 2021-11-21 RX ADMIN — PIPERACILLIN SODIUM AND TAZOBACTAM SODIUM 4.5 G: 4; .5 INJECTION, POWDER, LYOPHILIZED, FOR SOLUTION INTRAVENOUS at 16:01

## 2021-11-21 RX ADMIN — ACETYLCYSTEINE 2 ML: 200 SOLUTION ORAL; RESPIRATORY (INHALATION) at 20:56

## 2021-11-21 RX ADMIN — MYCOPHENOLATE MOFETIL 1000 MG: 200 POWDER, FOR SUSPENSION ORAL at 07:37

## 2021-11-21 RX ADMIN — CALCIUM CARBONATE 600 MG (1,500 MG)-VITAMIN D3 400 UNIT TABLET 1 TABLET: at 18:03

## 2021-11-21 RX ADMIN — Medication 1 PACKET: at 07:36

## 2021-11-21 RX ADMIN — POTASSIUM CHLORIDE 20 MEQ: 29.8 INJECTION, SOLUTION INTRAVENOUS at 07:35

## 2021-11-21 RX ADMIN — SULFAMETHOXAZOLE AND TRIMETHOPRIM 1 TABLET: 400; 80 TABLET ORAL at 07:36

## 2021-11-21 RX ADMIN — NYSTATIN 1000000 UNITS: 500000 SUSPENSION ORAL at 07:36

## 2021-11-21 RX ADMIN — TACROLIMUS 2 MG: 5 CAPSULE ORAL at 07:38

## 2021-11-21 RX ADMIN — PIPERACILLIN SODIUM AND TAZOBACTAM SODIUM 4.5 G: 4; .5 INJECTION, POWDER, LYOPHILIZED, FOR SOLUTION INTRAVENOUS at 03:24

## 2021-11-21 RX ADMIN — NYSTATIN 1000000 UNITS: 500000 SUSPENSION ORAL at 16:01

## 2021-11-21 RX ADMIN — TACROLIMUS 2.5 MG: 5 CAPSULE ORAL at 18:03

## 2021-11-21 RX ADMIN — ACETYLCYSTEINE 2 ML: 200 SOLUTION ORAL; RESPIRATORY (INHALATION) at 12:06

## 2021-11-21 RX ADMIN — INSULIN ASPART 1 UNITS: 100 INJECTION, SOLUTION INTRAVENOUS; SUBCUTANEOUS at 00:24

## 2021-11-21 RX ADMIN — INSULIN ASPART 3 UNITS: 100 INJECTION, SOLUTION INTRAVENOUS; SUBCUTANEOUS at 03:37

## 2021-11-21 RX ADMIN — MULTIVIT AND MINERALS-FERROUS GLUCONATE 9 MG IRON/15 ML ORAL LIQUID 15 ML: at 07:35

## 2021-11-21 RX ADMIN — LEVALBUTEROL HYDROCHLORIDE 1.25 MG: 1.25 SOLUTION RESPIRATORY (INHALATION) at 12:06

## 2021-11-21 RX ADMIN — ESCITALOPRAM 5 MG: 5 TABLET, FILM COATED ORAL at 07:36

## 2021-11-21 RX ADMIN — LEVALBUTEROL HYDROCHLORIDE 1.25 MG: 1.25 SOLUTION RESPIRATORY (INHALATION) at 09:02

## 2021-11-21 RX ADMIN — Medication 10 MG: at 20:46

## 2021-11-21 RX ADMIN — Medication 1 PACKET: at 20:47

## 2021-11-21 RX ADMIN — QUETIAPINE FUMARATE 25 MG: 25 TABLET ORAL at 07:36

## 2021-11-21 RX ADMIN — INSULIN ASPART 2 UNITS: 100 INJECTION, SOLUTION INTRAVENOUS; SUBCUTANEOUS at 13:07

## 2021-11-21 RX ADMIN — MYCOPHENOLATE MOFETIL 1000 MG: 200 POWDER, FOR SUSPENSION ORAL at 20:46

## 2021-11-21 RX ADMIN — LEVALBUTEROL HYDROCHLORIDE 1.25 MG: 1.25 SOLUTION RESPIRATORY (INHALATION) at 20:55

## 2021-11-21 RX ADMIN — INSULIN ASPART 1 UNITS: 100 INJECTION, SOLUTION INTRAVENOUS; SUBCUTANEOUS at 23:41

## 2021-11-21 RX ADMIN — PREDNISONE 10 MG: 5 TABLET ORAL at 20:46

## 2021-11-21 RX ADMIN — Medication 1.5 MG: at 18:03

## 2021-11-21 RX ADMIN — Medication 40 MG: at 20:46

## 2021-11-21 RX ADMIN — INSULIN ASPART 2 UNITS: 100 INJECTION, SOLUTION INTRAVENOUS; SUBCUTANEOUS at 16:07

## 2021-11-21 RX ADMIN — INSULIN GLARGINE 30 UNITS: 100 INJECTION, SOLUTION SUBCUTANEOUS at 20:47

## 2021-11-21 RX ADMIN — POTASSIUM CHLORIDE 40 MEQ: 40 SOLUTION ORAL at 07:35

## 2021-11-21 RX ADMIN — INSULIN GLARGINE 30 UNITS: 100 INJECTION, SOLUTION SUBCUTANEOUS at 10:20

## 2021-11-21 RX ADMIN — Medication 40 MG: at 07:37

## 2021-11-21 RX ADMIN — LEVOTHYROXINE SODIUM 25 MCG: 0.03 TABLET ORAL at 07:36

## 2021-11-21 RX ADMIN — NYSTATIN 1000000 UNITS: 500000 SUSPENSION ORAL at 20:46

## 2021-11-21 RX ADMIN — LABETALOL HYDROCHLORIDE 20 MG: 5 INJECTION, SOLUTION INTRAVENOUS at 18:11

## 2021-11-21 RX ADMIN — PIPERACILLIN SODIUM AND TAZOBACTAM SODIUM 4.5 G: 4; .5 INJECTION, POWDER, LYOPHILIZED, FOR SOLUTION INTRAVENOUS at 10:20

## 2021-11-21 RX ADMIN — NYSTATIN 1000000 UNITS: 500000 SUSPENSION ORAL at 12:56

## 2021-11-21 RX ADMIN — PREDNISONE 12.5 MG: 2.5 TABLET ORAL at 07:36

## 2021-11-21 RX ADMIN — QUETIAPINE FUMARATE 25 MG: 25 TABLET ORAL at 20:46

## 2021-11-21 RX ADMIN — ACETYLCYSTEINE 2 ML: 200 SOLUTION ORAL; RESPIRATORY (INHALATION) at 15:39

## 2021-11-21 RX ADMIN — CALCIUM CARBONATE 600 MG (1,500 MG)-VITAMIN D3 400 UNIT TABLET 1 TABLET: at 07:36

## 2021-11-21 RX ADMIN — Medication 1 TABLET: at 07:37

## 2021-11-21 RX ADMIN — PIPERACILLIN SODIUM AND TAZOBACTAM SODIUM 4.5 G: 4; .5 INJECTION, POWDER, LYOPHILIZED, FOR SOLUTION INTRAVENOUS at 21:31

## 2021-11-21 RX ADMIN — FLUCONAZOLE IN SODIUM CHLORIDE 400 MG: 2 INJECTION, SOLUTION INTRAVENOUS at 12:58

## 2021-11-21 RX ADMIN — Medication 1 PACKET: at 13:08

## 2021-11-21 ASSESSMENT — ACTIVITIES OF DAILY LIVING (ADL)
ADLS_ACUITY_SCORE: 20
ADLS_ACUITY_SCORE: 22
ADLS_ACUITY_SCORE: 20
ADLS_ACUITY_SCORE: 22
ADLS_ACUITY_SCORE: 20
ADLS_ACUITY_SCORE: 22
ADLS_ACUITY_SCORE: 22
ADLS_ACUITY_SCORE: 20
ADLS_ACUITY_SCORE: 22
ADLS_ACUITY_SCORE: 20
ADLS_ACUITY_SCORE: 20
ADLS_ACUITY_SCORE: 22
ADLS_ACUITY_SCORE: 21
ADLS_ACUITY_SCORE: 20
ADLS_ACUITY_SCORE: 22
ADLS_ACUITY_SCORE: 20
ADLS_ACUITY_SCORE: 20
ADLS_ACUITY_SCORE: 22
ADLS_ACUITY_SCORE: 22
ADLS_ACUITY_SCORE: 20
ADLS_ACUITY_SCORE: 22
ADLS_ACUITY_SCORE: 20
ADLS_ACUITY_SCORE: 22
ADLS_ACUITY_SCORE: 24

## 2021-11-21 ASSESSMENT — MIFFLIN-ST. JEOR: SCORE: 1395.54

## 2021-11-21 NOTE — PROGRESS NOTES
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  UGIB causing code blue on 11/2.      OVERNIGHT EVENTS:  -NAEO    TODAY'S PROGRESS:  - PST   - Trach Dome   - Diurese  - CT chest tomorrow    PLAN:  Neuro/ pain/ sedation:  Acute postoperative pain   Anxiety and depression  Acute to subacute CVA, b/l cerebral hemispheres and L cerebellum, suspect embolic  Encephalopathy and diffuse weakness - improving slightly   R ear lateral canal floor excoriation with bleeding - resolved   - Tylenol and oxycodone prn, lido patch, PRN dilaudid  - PTA lexapro  - Seroquel 25 BID               -PRN Seroquel    Pulmonary care:   SALTY on home CPAP  Acute hypoxic respiratory failure s/p b/l lung transplant 10/16/21  S/p tracheostomy 10/29  Empyema with Candida s/p chest tube 11/3/21  - mechanically ventilated via trach, PST, trach dome               - CMV overnight    - PST     - TD as tolerated  - Levalbuterol nebs scheduled and Mucomyst, chest PT  - Daily CXR  - appreciate Pulmonary       Cardiovascular:    HTN  Afib   PFO  Vasoplegic shock in setting of hypovolemia - resolved  - Monitor hemodynamic status.   - MAP goal <100, SBP <140  - prn labetalol  - rosuvastatin 10 mg  - Amiodarone 200 mg daily discontinued    GI care:   Elevated LFTs - recurrent  GI bleed 2/2 NG trauma, now recurrent secondary to GJ bumper ulcer   Moderate malnutrition in the context of acute on chronic illness  PEG-J, with  malpositioned J tube   - Diet: TF   - PPI BID    Fluids/ Electrolytes/ Nutrition:   Hypernatremia  Hyperkalemia, resolved   BL creat appears to be ~ 0.7  - free water flushes  - Goal 0 to -50  - Strict I/O, daily weights  - Avoid/limit nephrotoxins as able  - Replete lytes PRN per protocol  - Diurese      Endocrine:    Stress induced hyperglycemia with steroid-induced component, on DM2  Hypothyroidism  RA  Preop A1c 6.6  - Goal BG <180 for optimal healing  - continue home synthroid  - lantus 30 BID, sliding scale insulin                 - Endocrine signed off    ID/ Antibiotics:  Immunosuppression s/p transplant  Candidal infection of donor lungs (likely source), pleural fluid, and fungemia   PNA  - NGTD CSF. Last + blood cx 10/22. No vegetations on valves per TC 10/23  - Nystatin, Acyclovir, bactrim   - Tacrolimus (via g tube), prednisone    - appreciate transplant ID, ophthalmology    - d/w ID, no indication to treat Staph epi in sputum   - Fluconazole for fungemia (re-evaluate on 11/24)   - Zosyn for klebsiella and Pseudomonas for two weeks for coverage    Heme:     Acute blood loss anemia secondary to surgery and GI bleed, and anemia related to critical illness and iron deficiency    Hypogammaglobulinemia s/p IVIG  Thrombocytopenia, HIT negative - resolved   Lactic acidosis, resolved  Nonocclusive R subclavian thrombus    - 4t 2-4  - HIT panel  - comadin 16 NOV    Prophylaxis:    - Mechanical prophylaxis for DVT: SCDs  - Chemical DVT prophylaxis: warfarin   - PPI     Lines/ tubes/ drains:  - trach  - peg-j  - Watson 10/25; replaced 11/14  - PIVs  - chest tube L  - L PICC     Disposition:  - [facility] ICU    Patient seen, findings and plan discussed with [facility] ICU staff    Moises Ribera  PGY-3 Anesthesiology      ====================================    SUBJECTIVE:   - NAEO laying comfortably in bed    OBJECTIVE:   1. VITAL SIGNS:   Temp:  [97.2  F (36.2  C)-99.1  F (37.3  C)] 97.5  F (36.4  C)  Pulse:  [64-98] 64  Resp:   [16-26] 18  BP: ()/() 91/74  FiO2 (%):  [40 %] 40 %  SpO2:  [94 %-100 %] 99 %  Ventilation Mode: Trach collar  FiO2 (%): 40 %  Rate Set (breaths/minute): 12 breaths/min  Tidal Volume Set (mL): 460 mL  PEEP (cm H2O): 6 cmH2O  Oxygen Concentration (%): 40 %  Resp: 18      2. INTAKE/ OUTPUT:   I/O last 3 completed shifts:  In: 3298.5 [I.V.:703.5; NG/GT:1155]  Out: 4260 [Urine:3170; Emesis/NG output:250; Stool:800; Chest Tube:40]    3. PHYSICAL EXAMINATION:   General: Alert individual sitting in bed,  Neuro: Alert and Oriented, resting comfortably   Resp: Trach present, breathing comfortably  CV: Sinus  Abdomen: Soft, Non-distended, Non-tender  Incisions: c/d/i  Extremities: warm and well perfused    4. INVESTIGATIONS:   Arterial Blood Gases   No lab results found in last 7 days.  Complete Blood Count   Recent Labs   Lab 11/21/21  0333 11/20/21  0318 11/19/21  0341 11/18/21  0338   WBC 12.2* 11.2* 11.1* 13.1*   HGB 9.2* 7.6* 8.0* 8.0*   PLT 86* 78* 100* 117*     Basic Metabolic Panel  Recent Labs   Lab 11/21/21  0336 11/21/21  0333 11/21/21  0020 11/20/21  2110 11/20/21  1639 11/20/21  1635 11/20/21  0807 11/20/21  0318 11/19/21  2040 11/19/21  1635   NA  --  141  141  --   --   --  150*  --  144  144  --  140   POTASSIUM  --  3.8  3.8  --   --   --  3.6  --  3.5  3.5  --  4.3   CHLORIDE  --  106  106  --   --   --  112*  --  109  109  --  104   CO2  --  29  29  --   --   --  26  --  28  28  --  31   BUN  --  55*  55*  --   --   --  43*  --  36*  36*  --  41*   CR  --  0.86  0.86  --   --   --  0.76  --  0.72  0.72  --  0.72   * 204*  204* 152* 144*   < > 105*   < > 180*  180*   < > 174*    < > = values in this interval not displayed.     Liver Function Tests  Recent Labs   Lab 11/21/21  0333 11/20/21  0318 11/19/21  0341 11/18/21  0338   AST 20 13 18 14   ALT 29 23 29 28   ALKPHOS 133 111 123 123   BILITOTAL 0.2 0.2 0.2 0.2   ALBUMIN 2.4* 1.8* 1.9* 1.8*   INR 2.95* 2.12* 1.46* 1.40*      Pancreatic Enzymes  No lab results found in last 7 days.  Coagulation Profile  Recent Labs   Lab 11/21/21  0333 11/20/21  0318 11/19/21  0341 11/18/21  0338   INR 2.95* 2.12* 1.46* 1.40*         5. RADIOLOGY:   No results found for this or any previous visit (from the past 24 hour(s)).    =========================================

## 2021-11-21 NOTE — PROGRESS NOTES
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 Corral. 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  UGIB causing code blue on 11/2.      OVERNIGHT EVENTS:  -NAEO    TODAY'S PROGRESS:  - PST this AM  - Trach Dome this PM  - CMV tonight    PLAN:  Neuro/ pain/ sedation:  Acute postoperative pain   Anxiety and depression  Acute to subacute CVA, b/l cerebral hemispheres and L cerebellum, suspect embolic  Encephalopathy and diffuse weakness - improving slightly   R ear lateral canal floor excoriation with bleeding - resolved   - Tylenol and oxycodone prn, lido patch, PRN dilaudid  - PTA lexapro  - Seroquel 25 BID               -PRN Seroquel    Pulmonary care:   SALTY on home CPAP  Acute hypoxic respiratory failure s/p b/l lung transplant 10/16/21  S/p tracheostomy 10/29  Empyema with Candida s/p chest tube 11/3/21  - mechanically ventilated via trach, PST, trach dome               - CMV overnight    - PST this morning    - TD as tolerated  - Levalbuterol nebs scheduled and Mucomyst, chest PT  - Daily CXR  - appreciate Pulmonary       Cardiovascular:    HTN  Afib   PFO  Vasoplegic shock in setting of hypovolemia - resolved  - Monitor hemodynamic status.   - MAP goal <100, SBP <140  - metoprolol 25 BID   - continue to hold  - prn labetalol  - rosuvastatin 10 mg  - Amiodarone 200 mg daily discontinued  - low-intensity heparin gtt discontinued    GI care:   Elevated LFTs - recurrent  GI bleed 2/2 NG trauma, now recurrent secondary to GJ  bumper ulcer   Moderate malnutrition in the context of acute on chronic illness  PEG-J, with malpositioned J tube   - Diet: TF   - PPI BID    Fluids/ Electrolytes/ Nutrition:   Hypernatremia  Hyperkalemia, resolved   BL creat appears to be ~ 0.7  - free water flushes  - Goal 0 to -50               - Continue Lasix 40 BID  - Strict I/O, daily weights  - Avoid/limit nephrotoxins as able  - Replete lytes PRN per protocol      Endocrine:    Stress induced hyperglycemia with steroid-induced component, on DM2  Hypothyroidism  RA  Preop A1c 6.6  - Goal BG <180 for optimal healing  - continue home synthroid  - lantus 50, sliding scale insulin                 - Endocrine signed off    ID/ Antibiotics:  Immunosuppression s/p transplant  Candidal infection of donor lungs (likely source), pleural fluid, and fungemia   PNA  - NGTD CSF. Last + blood cx 10/22. No vegetations on valves per TC 10/23  - Nystatin, Acyclovir, bactrim   - Tacrolimus (via g tube), prednisone    - appreciate transplant ID, ophthalmology    - d/w ID, no indication to treat Staph epi in sputum   - Fluconazole for fungemia (re-evaluate on 11/24)   - Zosyn for klebsiella and Pseudomonas for two weeks for coverage    Heme:     Acute blood loss anemia secondary to surgery and GI bleed, and anemia related to critical illness and iron deficiency    Hypogammaglobulinemia s/p IVIG  Thrombocytopenia, HIT negative - resolved   Lactic acidosis, resolved  Nonocclusive R subclavian thrombus    - heparin low intensity discontinued  - 4t 2-4  - HIT panel  - Started cumadin 16 NOV    Prophylaxis:    - Mechanical prophylaxis for DVT: SCDs  - Chemical DVT prophylaxis: heparin low intensity gtt, start warfarin today  - PPI     Lines/ tubes/ drains:  - trach  - peg-j  - Watson 10/25; replaced 11/14  - PIVs  - chest tube L  - L PICC     Disposition:  - [facility]    Patient seen, findings and plan discussed with [facility] ICU staff      Tammie Perez MD (PGY-3)  General  Surgery    ====================================    SUBJECTIVE:   - NAEO laying comfortably in bed    OBJECTIVE:   1. VITAL SIGNS:   Temp:  [97.7  F (36.5  C)-99.1  F (37.3  C)] 99.1  F (37.3  C)  Pulse:  [] 93  Resp:  [15-26] 20  BP: (105-148)/() 127/86  FiO2 (%):  [40 %] 40 %  SpO2:  [93 %-100 %] 94 %  Ventilation Mode: Trach collar  FiO2 (%): 40 %  Rate Set (breaths/minute): 12 breaths/min  Tidal Volume Set (mL): 460 mL  PEEP (cm H2O): 6 cmH2O  Pressure Support (cm H2O): 8 cmH2O  Oxygen Concentration (%): 40 %  Resp: 20      2. INTAKE/ OUTPUT:   I/O last 3 completed shifts:  In: 3490.5 [I.V.:830.5; NG/GT:1220]  Out: 4965 [Urine:3820; Emesis/NG output:205; Stool:900; Chest Tube:40]    3. PHYSICAL EXAMINATION:   General: Alert individual sitting in bed,  Neuro: Alert and Oriented, resting comfortably   Resp: Trach present, breathing comfortably  CV: Intermittent Sinus Pepe  Abdomen: Soft, Non-distended, Non-tender  Incisions: c/d/i  Extremities: warm and well perfused    4. INVESTIGATIONS:   Arterial Blood Gases   No lab results found in last 7 days.  Complete Blood Count   Recent Labs   Lab 11/20/21  0318 11/19/21  0341 11/18/21  0338 11/17/21  0337   WBC 11.2* 11.1* 13.1* 13.7*   HGB 7.6* 8.0* 8.0* 8.6*   PLT 78* 100* 117* 132*     Basic Metabolic Panel  Recent Labs   Lab 11/20/21  1639 11/20/21  1635 11/20/21  1253 11/20/21  0807 11/20/21  0318 11/19/21  2040 11/19/21  1635 11/19/21  0345 11/19/21  0341   NA  --  150*  --   --  144  144  --  140  --  139   POTASSIUM  --  3.6  --   --  3.5  3.5  --  4.3  --  4.0   CHLORIDE  --  112*  --   --  109  109  --  104  --  101   CO2  --  26  --   --  28  28  --  31  --  32   BUN  --  43*  --   --  36*  36*  --  41*  --  47*   CR  --  0.76  --   --  0.72  0.72  --  0.72  --  0.68   GLC 99 105* 210* 101* 180*  180*   < > 174*   < > 160*    < > = values in this interval not displayed.     Liver Function Tests  Recent Labs   Lab 11/20/21  8089  11/19/21  0341 11/18/21  0338 11/17/21 0337   AST 13 18 14 15   ALT 23 29 28 30   ALKPHOS 111 123 123 150   BILITOTAL 0.2 0.2 0.2 0.2   ALBUMIN 1.8* 1.9* 1.8* 2.1*   INR 2.12* 1.46* 1.40* 1.23*     Pancreatic Enzymes  No lab results found in last 7 days.  Coagulation Profile  Recent Labs   Lab 11/20/21  0318 11/19/21  0341 11/18/21  0338 11/17/21 0337   INR 2.12* 1.46* 1.40* 1.23*         5. RADIOLOGY:   Recent Results (from the past 24 hour(s))   XR Chest Port 1 View    Narrative    Exam: XR CHEST PORT 1 VIEW, 11/20/2021 3:22 AM    Comparison: Chest x-ray 11/19/2021    History: s/p lung transplant    Findings:  Single portable AP semiupright view of the chest is obtained.  Postoperative changes of bilateral lung transplantation, clamshell  sternotomy wires are intact. Tracheostomy tube tip is in the mid  thoracic trachea. Left basilar chest tube in place. Left upper  extremity PICC tip terminates at the superior cavoatrial junction.    Trachea is midline. Mediastinum is within normal limits.  Cardiopulmonary silhouette is within normal limits. Stable appearance  of left basilar pleural effusion and associated atelectasis. Unchanged  right basilar atelectasis. No pneumothorax. The upper abdomen is  unremarkable.      Impression    Impression:   1. Unchanged left-sided pleural effusion and associated atelectasis  with chest tube in place.  2. Slightly reduced right-sided pleural effusion and associated  atelectasis..    I have personally reviewed the examination and initial interpretation  and I agree with the findings.    ANUPAM GASTON,          SYSTEM ID:  [id number]       =========================================

## 2021-11-21 NOTE — PROGRESS NOTES
Pulmonary Medicine  Cystic Fibrosis - Lung Transplant Team  Progress Note  2021       Patient: Edson Thornton  MRN: [id number]  : 1965 (age 56 year old)  Transplant: 10/16/2021 (Lung), POD#36  Admission date: 2021    Assessment & Plan:     Edson Thornton is a 56 year old male with a PMH significant for NSIP/ILD, bronchiectasis, moderate PH, RA, SALTY, chronic HSV infection, hypogammaglobulinemia, steroid-induced diabetes, hypothyroidism, PFO, HTN, HLD, duodenal anomaly, anxiety, and depression.  Admitted on 21 from OSH for acute on chronic respiratory failure 2/2 ILD exacerbation, now s/p BSLT on 10/16/21.  Prolonged vent wean s/p trach and PEG/J tube placement with thoracic surgery 10/29.  Post-op course otherwise complicated by encephalopathy and diffuse weakness, acute to subacute CVA, afib with RVR, BRENNAN, GI bleed, Candidemia/Candida empyema, and anxiety.  Code called  for bradycardia/asystole, progressive hypotensive, found to have significant GI bleed, EGD with adherent clot near PEG tube site that was clipped.  Tolerating TD trials for 24 hours and ongoing improvement in mentation and weakness.      Today's recommendations:    - TD as tolerated but low threshold to put back on support   --consider changing to a bipap if needs support  - Diuresis per ICU team   -agree with dose reduction  - Tacrolimus repeat level  (ordered)  - Prednisone taper  not ordered  - Coccidioides Ag,  serum pending  - Continue Zosyn for 2 week course through   - Follow pending pleural fungal cultures and fungal/bacterial blood cultures from    -NGTD  - Continue fluconazole (reevaluate around , repeat fungal cultures pending)   - EKG weekly for QTc monitoring (, ordered)  - Will need repeat imaging to ensure left pleural effusion almost entirely resolved before removing chest tube and determining duration of fluconazole   -non contrast chest CT    -pleural and fungal  blood cultures 11/18 with NGTD  - Repeat IgG (12/15, ordered)  - DSA monitoring q2 weeks, (11/29) ordered     S/p BSLT for ILD:  Acute hypoxic respiratory failure s/p trach:   Bilateral pleural effusions: Unfortunately had not received vaccination for flu, PNA, or COVID-19 PTA.  Explant pathology with NSIP, no malignancy.  PGD 2-3.  Weaned off paralytic 10/19 (for vent dyssynchrony) and Caroline 10/22.  Initial difficulty weaning sedation given agitation then with neurological findings as below.  Prolonged vent wean s/p trach and PEG/J tube placement with thoracic surgery 10/29.  Code called 11/2 for bradycardia/asystole (required 1 round of CPR, no medications) then progressively hypotensive, GI bleed as below.  Chest CT 11/2 with increased bilateral pleural effusions (moderate left, small right), bibasilar atelectasis with area of hypoenhancing parenchyma in LLL (suspicious for infection), and numerous nondisplaced anterior rib fractures bilaterally.  S/p left chest tube placement in IR 11/3 for pleural effusion/empyema (as below), right deferred given very little effusion on US.  Problems with trach cuff leak 11/3 (requiring multiple exchanges), bronch 11/14 with trach in good position with cuff well sealed in trachea and small to moderate secretions mostly in lower lobes.  - Nebs: levalbuterol and Mucomyst QID  - Pulmonary toilet with chest physiotherapy QID  - TD as tolerated otherwise bipap for support  - Diuresis per ICU team  - CXR reviewed by me today     Immunosuppression: s/p induction therapy with basiliximab 10/16 (and high dose IV steroid) and 10/20  - Tacrolimus 2 mg BID (suspension, via G tube).  Goal level 8-12. Repeat level 11/22 (ordered)  - MMF 1000 mg BID (11/2, decreased given GI bleed, AZA to be avoided given TPMT)  - Prednisolone taper due 12/5 ( not ordered)  Date AM dose (mg) PM dose (mg)   11/21/21 12.5 10   12/5/21 10 10   1/2/22 10 7.5   1/30/22 7.5 7.5   2/27/22 7.5 5   3/27/22 5 5   4/24/22  5 2.5      Prophylaxis:   - Bactrim for PJP ppx (held 11/2-11/5 d/t BRENNAN)  - Nystatin for oral candidiasis ppx, 6 month course  - See below for serologies and viral ppx:    Donor Recipient Intervention   CMV status Negative Negative None, CMV monthly (due 12/14, neg 11/16)   EBV status Positive Positive None, EBV monthly (due 12/14, neg 11/16)   HSV status N/A Positive S/p acyclovir POD #1-30 (recent infection history pre-txp)      ID: Concern for possible Strongyloides exposure pre-transplant s/p ivermectin x1 dose (9/17).  Donor and recipient cultures NGTD.  S/p IV ceftazidime/vancomycin for 48h per protocol and additional empiric ceftazidime 10/19-10/23 given recurrent fevers as below.  Cryptococcal Ag negative 10/23.  - Monthly Coccidioides Ag x12 months post-transplant per ID (urine and serum negative 10/17), 11/17 urine neg, serum pending     Klebsiella pneumoniae:  Pseudomonas fluorescens/putida: Klebsiella initially noted trach sputum culture 11/10.  Started on ceftriaxone 11/11, transitioned to ertapenem 11/12-11/13, and back to ceftriaxone 11/14.  Bronch culture 11/12 with Klebsiella and Pseudomonas fluorescens/putida (R-meropenem).     - ABX: Zosyn (11/15) for 2 week course through 11/29     Recurring fevers, Resolved:  Fevers post-op, Tmax 101.7 POD #1.  Febrile with worsening leukocytosis again 10/25, generally persisting.  LP (10/29), xanthochromic with pleocytosis thought to be appropriate given RBC and WBCs, no ABX recommended per transplant ID and neurology.  S/p empiric meropenem 10/28-11/2.  Now afebrile, ABX as above.     Disseminated Candida: Noted on blood cultures 10/20 and 10/22.  BDG fungitell positive (399) on 10/20.  Respiratory cultures with persistent Candida albicans.  TC 10/23 without evidence of endocarditis.  Ophthalmology consult 10/24 with benign dilated fundoscopic exam.  Candida empyema also noted 10/25, chest tubes inadvertently removed by CVTS 10/28, left chest tube replaced by  IR 11/3 as above with ongoing Candida on cultures.   - Repeat pleural fungal cultures and fungal/bacterial blood cultures (11/18), NGTD  - Fluconazole (10/26 per transplant ID, prior micafungin 10/22-10/27), repeat EKG for QTc monitoring 11/23 (ordered), LFTs as below (see transplant ID note 11/5). Will need repeat imaging to ensure left pleural effusion almost entirely resolved before removing chest tube and concluding fluconazole, reevaluate around 11/22      HSV: Chronic intermittent active infection pre-transplant with recent HSV infection: crusted lesions throughout left side of jaw, s/p 10 day treatment course of ACV through 10/9.  HSV PCR blood negative 10/17.  S/p ACV ppx as above (started POD #1 instead of POD #8 given HSV history and location).     Hypogammaglobulinemia: IgG previously low at 364 (9/7).  Noted at 265 at time of transplant, s/p IVIG 10/21, repeat IgG (11/17) 198, s/p IVIG (with premedication) 11/18.  - Repeat IgG (12/15, ordered)     Positive cross match: Note that he received two doses of rituximab in June, which is likely contributing to cross match result.  DSA most recently negative 11/15.  - DSA monitoring q2 weeks, (11/29) ordered     SALTY: Noted pre-transplant.  Home CPAP 6-12 cm H2O.  - Evaluate need for BiPAP after decannulation.     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  MRI 10/23 with multifocal subacute infarcts within both cerebral hemispheres and left cerebellum.  DDx include surgery v embolic v infectious.  Heparin drip started 10/23 (intermittently held with GI bleed as below).  Repeat stroke code 10/25 d/t marked decrease in responsiveness with sedation wean, pupil inequality, and absent gag reflex.   CTA head without obvious new pathology, MRI brain (with and without contrast) primarily revealing for infarct, low likelihood of PRES.  Ammonia normal.  VEEG per neuro with severe diffuse encephalopathy.  Gradual improvement noted since 10/29, repeat head CT 11/2 (following code) without new acute intracranial abnormalities.  - AC management per primary team as below  - PT/OT     Afib with RVR: Noted 10/18, started on amiodarone drip and converted to SR, Transitioned to PO 10/21, dose decreased 10/29--discontinued 11/19. AC as above.   --off metoprolol and amio because of intermittent bradycardia     Right subclavian DVT: Seen on UE US from 11/4.  Heparin drip resumed 11/5.  Heparin stopped 11/20 because of therapeutic INR on warfarin and thrombocytopenia  - 11/21 HIT negative      Recurrent GI bleed, Resolved: Hemoglobin dropped to 6.6 10/22, s/p 2 units pRBC.  OG tube with bloody output, EGD 10/23 noted NJ/OG tube trauma with scant oozing.  Progressive hypotension 11/2, hemoglobin 5.8 with bloody G tube output.  Heparin drip held, transitioned to PPI drip, and MTP activated.  EGD 11/2 with large amount of clotted blood in stomach and area of raised mucosa with small adherent clot near PEG tube site that was clipped, no active bleeding.  Maroon stools 11/3, repeat EGD with extensive old blood in stomach, no active bleeding, small nodular area with prior clips clipped again.  Most recent EGD 11/5 with ulcer noted at PEG tube bumper site, gastritis, and suction marks from G tube. TF noted in G tube drainage bag 11/7, AXR with GJ tube tip projecting over proximal duodenum. GJ tube exchanged 11/9 by GI.      Elevated LFTs: Shock liver post-op.  Now resolved.    --Intermittent alk phos elevation      Hypomagnesemia: Suppressed reabsorption 2/2 CNI.  Requiring nearly daily IV replacement.  - IV and PO magnesium supplementation      We appreciate the excellent care provided by the CVICU and CVTS teams.  Recommendations  "communicated via in person rounding and this note.  Will continue to follow along closely, please do not hesitate to call with any questions or concerns.    Brittany Wells MD MPH  Associate Professor of Medicine  Pager [phone]       Subjective & Interval History:     Patient feels comfortable on TD this am.  Denies chest pain.  Mimimal cough.  Limited interview given trach.  Able to follow commands.    Review of Systems:     C: No fever, no chills, receiving tube feeds  ENT/MOUTH: no sinus pain, no nasal congestion or drainage  RESP: See interval history  CV: No chest pain  GI: No nausea, no vomiting, no change in stools, no reflux symptoms  : metzger in place  NEURO: No headache  PSYCHIATRIC: Mood stable, denies anxiety    Physical Exam:     Vital signs:  Temp: 97.6  F (36.4  C) Temp src: Oral BP: (!) 129/92 Pulse: 79   Resp: 16 SpO2: 99 % O2 Device: Trach dome Oxygen Delivery: 40 LPM Height: 162.6 cm (5' 4\") Weight: 65.5 kg (144 lb 4.8 oz)  I/O:     Intake/Output Summary (Last 24 hours) at 11/21/2021 1339  Last data filed at 11/21/2021 1300  Gross per 24 hour   Intake 2900 ml   Output 2945 ml   Net -45 ml       Constitutional: lying in bed, in no apparent distress.   HEENT: Eyes with pink conjunctivae, anicteric.  Oral mucosa dry  without lesions.  Trach in place.   PULM: Good air flow bilaterally.  No crackles, no rhonchi, no wheezes.  Well healed clamshell  CV: Normal S1 and S2.  RRR.  No murmur, gallop, or rub.  No peripheral edema.   ABD: NABS, soft, nontender, nondistended.  GJ tube  MSK: Moves all extremities.  + apparent muscle wasting.   NEURO: Alert and interactive   SKIN: Warm, dry.  Scattered ecchymosis  PSYCH: Mood calm    Lines, Drains, and Devices:  Peripheral IV 11/02/21 Anterior;Right Upper forearm (Active)   Site Assessment WDL 11/20/21 1200   Line Status Saline locked 11/20/21 1200   Dressing Intervention Dressing reinforced 11/08/21 2000   Phlebitis Scale 0-->no symptoms 11/20/21 1200 "   Infiltration Scale 0 11/20/21 1200   Infiltration Site Treatment Method  None 11/20/21 0800   Number of days: 18       PICC Triple Lumen 11/04/21 Left Basilic Access. PICC okay to use. (Active)   Site Assessment WDL 11/20/21 1200   External Cath Length (cm) 1 cm 11/04/21 1747   Extremity Circumference (cm) 28 cm 11/04/21 1747   Dressing Intervention Chlorhexidine patch;Transparent 11/20/21 1200   Dressing Change Due 11/21/21 11/20/21 1200   Mosley - Status heparin locked 11/20/21 1200   Mosley - Cap Change Due 11/23/21 11/20/21 1200   Red - Status infusing 11/20/21 1200   Red - Cap Change Due 11/23/21 11/20/21 1200   White - Status heparin locked 11/20/21 1200   White - Cap Change Due 11/23/21 11/20/21 1200   Extravasation? No 11/17/21 2000   Line Necessity Yes, meets criteria 11/20/21 1200   Number of days: 16     Data:     LABS    CMP:   Recent Labs   Lab 11/21/21  1306 11/21/21  0756 11/21/21  0336 11/21/21  0333 11/20/21  1639 11/20/21  1635 11/20/21  0807 11/20/21  0318 11/19/21  2040 11/19/21  1635 11/19/21  0345 11/19/21  0341 11/18/21  0341 11/18/21  0338   NA  --   --   --  141  141  --  150*  --  144  144  --  140  --  139   < > 142   POTASSIUM  --   --   --  3.8  3.8  --  3.6  --  3.5  3.5  --  4.3  --  4.0   < > 3.8   CHLORIDE  --   --   --  106  106  --  112*  --  109  109  --  104  --  101   < > 106   CO2  --   --   --  29  29  --  26  --  28  28  --  31  --  32   < > 28   ANIONGAP  --   --   --  6  6  --  12  --  7  7  --  5  --  6   < > 8   * 130* 195* 204*  204*   < > 105*   < > 180*  180*   < > 174*   < > 160*   < > 168*   BUN  --   --   --  55*  55*  --  43*  --  36*  36*  --  41*  --  47*   < > 48*   CR  --   --   --  0.86  0.86  --  0.76  --  0.72  0.72  --  0.72  --  0.68   < > 0.67   GFRESTIMATED  --   --   --  >90  >90  --  >90  --  >90  >90  --  >90  --  >90   < > >90   ERIK  --   --   --  9.0  9.0  --  8.0*  --  7.7*  7.7*  --  8.6  --  8.5   < > 7.8*   MAG  --   --    --  2.7*  --  2.0  --  1.5*  --   --   --  1.6  --  2.2   PHOS  --   --   --  4.1  --   --   --  4.1  --   --   --  3.6  --  3.5   PROTTOTAL  --   --   --  6.4*  --   --   --  5.4*  --   --   --  5.6*  --  4.9*   ALBUMIN  --   --   --  2.4*  --   --   --  1.8*  --   --   --  1.9*  --  1.8*   BILITOTAL  --   --   --  0.2  --   --   --  0.2  --   --   --  0.2  --  0.2   ALKPHOS  --   --   --  133  --   --   --  111  --   --   --  123  --  123   AST  --   --   --  20  --   --   --  13  --   --   --  18  --  14   ALT  --   --   --  29  --   --   --  23  --   --   --  29  --  28    < > = values in this interval not displayed.     CBC:   Recent Labs   Lab 11/21/21 0333 11/20/21 0318 11/19/21 0341 11/18/21 0338   WBC 12.2* 11.2* 11.1* 13.1*   RBC 3.02* 2.47* 2.58* 2.58*   HGB 9.2* 7.6* 8.0* 8.0*   HCT 30.1* 25.2* 25.0* 25.5*    102* 97 99   MCH 30.5 30.8 31.0 31.0   MCHC 30.6* 30.2* 32.0 31.4*   RDW 16.4* 16.4* 16.7* 17.2*   PLT 86* 78* 100* 117*       INR:   Recent Labs   Lab 11/21/21 0333 11/20/21 0318 11/19/21 0341 11/18/21 0338   INR 2.95* 2.12* 1.46* 1.40*       Glucose:   Recent Labs   Lab 11/21/21  1306 11/21/21  0756 11/21/21 0336 11/21/21 0333 11/21/21  0020 11/20/21  2110   * 130* 195* 204*  204* 152* 144*       Blood Gas:   Recent Labs   Lab 11/21/21  1029 11/20/21  0324 11/19/21  1509   PHV 7.38 7.36 7.39   PCO2V 49 51* 48   PO2V 34 36 36   HCO3V 29* 28 29*   LORI 3.1* 2.3* 3.1*   O2PER 40 40 40       Culture Data No results for input(s): CULT in the last 168 hours.    Virology Data:   Lab Results   Component Value Date    FLUAH1 Negative 11/12/2021    FLUAH3 Negative 11/12/2021    BZ1963 Negative 11/12/2021    IFLUB Negative 11/12/2021    RSVA Negative 11/12/2021    RSVB Negative 11/12/2021    PIV1 Negative 11/12/2021    PIV2 Negative 11/12/2021    PIV3 Negative 11/12/2021    HMPV Negative 11/12/2021    HRVS Negative 11/12/2021    ADVBE Negative 11/12/2021    ADVC Negative 11/12/2021     ADVC Negative 10/17/2021       Historical CMV results (last 3 of prior testing):  Lab Results   Component Value Date    CMVQNT Not Detected 11/16/2021    CMVQNT Not Detected 11/12/2021    CMVQNT Not Detected 10/26/2021     No results found for: CMVLOG    Urine Studies    Recent Labs   Lab Test 11/01/21  1336 10/25/21  1507   URINEPH 5.5 5.5   NITRITE Negative Negative   LEUKEST Negative Negative   WBCU 0 2       Most Recent [facility] Pulmonary Function Testing (FVC/FEV1 only)  No results found for: 20002  No results found for: 20003  No results found for: 20015  No results found for: 20016    IMAGING    Recent Results (from the past 48 hour(s))   XR Chest Port 1 View    Narrative    Exam: XR CHEST PORT 1 VIEW, 11/19/2021 1:05 AM    Comparison: Chest x-ray 11/18/2021    History: s/p lung transplant    Findings:  Single portable AP supine projection of the chest is obtained.  Tracheostomy tube tip is in the mid thoracic trachea. Left upper chest  PICC tip at superior cavoatrial junction. Left basilar chest tube in  place. Postoperative changes of bilateral lung transplantation,  clamshell sternotomy wires are intact.    Trachea is midline. Mediastinum is within normal limits.  Cardiopulmonary silhouette is within normal limits. Slight reduction  in left basilar pleural effusion and associated atelectasis. Slightly  reduced right-sided pleural effusion.. No pneumothorax. The upper  abdomen is unremarkable.      Impression    Impression:   1. Slight reduction in left basilar pleural effusion and associated  atelectasis with chest tube placed.  2. Slightly reduced right-sided pleural effusion    I have personally reviewed the examination and initial interpretation  and I agree with the findings.    KRISTA SON MD         SYSTEM ID:  [id number]   XR Chest Port 1 View    Narrative    Exam: XR CHEST PORT 1 VIEW, 11/20/2021 3:22 AM    Comparison: Chest x-ray 11/19/2021    History: s/p lung transplant    Findings:  Single  portable AP semiupright view of the chest is obtained.  Postoperative changes of bilateral lung transplantation, clamshell  sternotomy wires are intact. Tracheostomy tube tip is in the mid  thoracic trachea. Left basilar chest tube in place. Left upper  extremity PICC tip terminates at the superior cavoatrial junction.    Trachea is midline. Mediastinum is within normal limits.  Cardiopulmonary silhouette is within normal limits. Stable appearance  of left basilar pleural effusion and associated atelectasis. Unchanged  right basilar atelectasis. No pneumothorax. The upper abdomen is  unremarkable.      Impression    Impression:   1. Unchanged left-sided pleural effusion and associated atelectasis  with chest tube in place.  2. Slightly reduced right-sided pleural effusion and associated  atelectasis..    I have personally reviewed the examination and initial interpretation  and I agree with the findings.    ANUPAM GASTON DO         SYSTEM ID:  [id number]

## 2021-11-21 NOTE — PLAN OF CARE
"Pertinent PMH: \"Woody\" is being followed by CVTS who was admitted on 09/05 for ILD complications now s/p BSLT on 10/16 c/b CVA encephalopathy, acute resp failure, BRENNAN, multiple fungal lung infections, pleural effusion s/p CT and 11/12 PEA arrest d/t GIB s/p MTP.  Major Shift Events: Responds to yes/no questions, follows commands, neuro intact, moves all extremities. Denies pain. On trach dome settings all night, 40%/40L. SR on tele. VSS, MAP . 75 mL/Hr UOP, TF at 60mL/Hr c/ Q4Hr 50 free H2O, rectal tube OP minimal and no CT OP. All dressings and PICC line dressing changed.  Plan: Continue to wean down trach dome, ambulate to chair. Transition to floor/LTAC when weaned from vent settings completely. Continue to monitor.     For complete vital signs, hemodynamics, medications/drips, and complete assessments please see documentation flowsheets.  "

## 2021-11-22 ENCOUNTER — APPOINTMENT (OUTPATIENT)
Dept: PHYSICAL THERAPY | Facility: CLINIC | Age: 56
End: 2021-11-22
Attending: STUDENT IN AN ORGANIZED HEALTH CARE EDUCATION/TRAINING PROGRAM
Payer: COMMERCIAL

## 2021-11-22 ENCOUNTER — APPOINTMENT (OUTPATIENT)
Dept: OCCUPATIONAL THERAPY | Facility: CLINIC | Age: 56
End: 2021-11-22
Attending: STUDENT IN AN ORGANIZED HEALTH CARE EDUCATION/TRAINING PROGRAM
Payer: COMMERCIAL

## 2021-11-22 ENCOUNTER — APPOINTMENT (OUTPATIENT)
Dept: CT IMAGING | Facility: CLINIC | Age: 56
End: 2021-11-22
Attending: STUDENT IN AN ORGANIZED HEALTH CARE EDUCATION/TRAINING PROGRAM
Payer: COMMERCIAL

## 2021-11-22 LAB
ALBUMIN SERPL-MCNC: 2.3 G/DL (ref 3.4–5)
ALP SERPL-CCNC: 127 U/L (ref 40–150)
ALT SERPL W P-5'-P-CCNC: 30 U/L (ref 0–70)
ANION GAP SERPL CALCULATED.3IONS-SCNC: 4 MMOL/L (ref 3–14)
ANION GAP SERPL CALCULATED.3IONS-SCNC: 6 MMOL/L (ref 3–14)
ANION GAP SERPL CALCULATED.3IONS-SCNC: 6 MMOL/L (ref 3–14)
APPEARANCE FLD: ABNORMAL
AST SERPL W P-5'-P-CCNC: 22 U/L (ref 0–45)
BACTERIA BLD CULT: NO GROWTH
BACTERIA PLR CULT: ABNORMAL
BASE EXCESS BLDV CALC-SCNC: -0.4 MMOL/L (ref -7.7–1.9)
BASE EXCESS BLDV CALC-SCNC: 1.9 MMOL/L (ref -7.7–1.9)
BILIRUB SERPL-MCNC: 0.2 MG/DL (ref 0.2–1.3)
BUN SERPL-MCNC: 47 MG/DL (ref 7–30)
BUN SERPL-MCNC: 53 MG/DL (ref 7–30)
BUN SERPL-MCNC: 53 MG/DL (ref 7–30)
CALCIUM SERPL-MCNC: 8.8 MG/DL (ref 8.5–10.1)
CALCIUM SERPL-MCNC: 9.4 MG/DL (ref 8.5–10.1)
CALCIUM SERPL-MCNC: 9.4 MG/DL (ref 8.5–10.1)
CHLORIDE BLD-SCNC: 112 MMOL/L (ref 94–109)
CHLORIDE BLD-SCNC: 112 MMOL/L (ref 94–109)
CHLORIDE BLD-SCNC: 114 MMOL/L (ref 94–109)
CO2 SERPL-SCNC: 26 MMOL/L (ref 20–32)
COLOR FLD: ABNORMAL
CREAT SERPL-MCNC: 0.74 MG/DL (ref 0.66–1.25)
CREAT SERPL-MCNC: 0.82 MG/DL (ref 0.66–1.25)
CREAT SERPL-MCNC: 0.82 MG/DL (ref 0.66–1.25)
ERYTHROCYTE [DISTWIDTH] IN BLOOD BY AUTOMATED COUNT: 16.6 % (ref 10–15)
GFR SERPL CREATININE-BSD FRML MDRD: >90 ML/MIN/1.73M2
GLUCOSE BLD-MCNC: 162 MG/DL (ref 70–99)
GLUCOSE BLD-MCNC: 191 MG/DL (ref 70–99)
GLUCOSE BLD-MCNC: 191 MG/DL (ref 70–99)
GLUCOSE BLDC GLUCOMTR-MCNC: 102 MG/DL (ref 70–99)
GLUCOSE BLDC GLUCOMTR-MCNC: 112 MG/DL (ref 70–99)
GLUCOSE BLDC GLUCOMTR-MCNC: 140 MG/DL (ref 70–99)
GLUCOSE BLDC GLUCOMTR-MCNC: 159 MG/DL (ref 70–99)
GLUCOSE BLDC GLUCOMTR-MCNC: 170 MG/DL (ref 70–99)
HCO3 BLDV-SCNC: 25 MMOL/L (ref 21–28)
HCO3 BLDV-SCNC: 27 MMOL/L (ref 21–28)
HCT VFR BLD AUTO: 30.7 % (ref 40–53)
HGB BLD-MCNC: 9.5 G/DL (ref 13.3–17.7)
INR PPP: 3.47 (ref 0.85–1.15)
KOH PREPARATION: NORMAL
LYMPHOCYTES NFR FLD MANUAL: 2 %
MAGNESIUM SERPL-MCNC: 1.9 MG/DL (ref 1.6–2.3)
MCH RBC QN AUTO: 30.4 PG (ref 26.5–33)
MCHC RBC AUTO-ENTMCNC: 30.9 G/DL (ref 31.5–36.5)
MCV RBC AUTO: 98 FL (ref 78–100)
MONOS+MACROS NFR FLD MANUAL: NORMAL %
NEUTS BAND NFR FLD MANUAL: 27 %
O2/TOTAL GAS SETTING VFR VENT: 40 %
O2/TOTAL GAS SETTING VFR VENT: 40 %
OTHER CELLS FLD MANUAL: 72 %
PCO2 BLDV: 42 MM HG (ref 40–50)
PCO2 BLDV: 43 MM HG (ref 40–50)
PH BLDV: 7.37 [PH] (ref 7.32–7.43)
PH BLDV: 7.41 [PH] (ref 7.32–7.43)
PHOSPHATE SERPL-MCNC: 4 MG/DL (ref 2.5–4.5)
PLATELET # BLD AUTO: 81 10E3/UL (ref 150–450)
PO2 BLDV: 34 MM HG (ref 25–47)
PO2 BLDV: 36 MM HG (ref 25–47)
POTASSIUM BLD-SCNC: 4.4 MMOL/L (ref 3.4–5.3)
POTASSIUM BLD-SCNC: 4.4 MMOL/L (ref 3.4–5.3)
POTASSIUM BLD-SCNC: 5 MMOL/L (ref 3.4–5.3)
PROT SERPL-MCNC: 6.1 G/DL (ref 6.8–8.8)
RBC # BLD AUTO: 3.13 10E6/UL (ref 4.4–5.9)
SCANNED LAB RESULT: NORMAL
SODIUM SERPL-SCNC: 144 MMOL/L (ref 133–144)
TACROLIMUS BLD-MCNC: 10.3 UG/L (ref 5–15)
TME LAST DOSE: NORMAL H
TME LAST DOSE: NORMAL H
WBC # BLD AUTO: 13.5 10E3/UL (ref 4–11)
WBC # FLD AUTO: 400 /UL

## 2021-11-22 PROCEDURE — 250N000013 HC RX MED GY IP 250 OP 250 PS 637: Performed by: STUDENT IN AN ORGANIZED HEALTH CARE EDUCATION/TRAINING PROGRAM

## 2021-11-22 PROCEDURE — 80048 BASIC METABOLIC PNL TOTAL CA: CPT

## 2021-11-22 PROCEDURE — 250N000012 HC RX MED GY IP 250 OP 636 PS 637: Performed by: PHYSICIAN ASSISTANT

## 2021-11-22 PROCEDURE — 250N000012 HC RX MED GY IP 250 OP 636 PS 637: Performed by: INTERNAL MEDICINE

## 2021-11-22 PROCEDURE — 0B9J8ZZ DRAINAGE OF LEFT LOWER LUNG LOBE, VIA NATURAL OR ARTIFICIAL OPENING ENDOSCOPIC: ICD-10-PCS | Performed by: INTERNAL MEDICINE

## 2021-11-22 PROCEDURE — 87633 RESP VIRUS 12-25 TARGETS: CPT | Performed by: INTERNAL MEDICINE

## 2021-11-22 PROCEDURE — 87106 FUNGI IDENTIFICATION YEAST: CPT | Performed by: INTERNAL MEDICINE

## 2021-11-22 PROCEDURE — 82803 BLOOD GASES ANY COMBINATION: CPT | Performed by: THORACIC SURGERY (CARDIOTHORACIC VASCULAR SURGERY)

## 2021-11-22 PROCEDURE — 250N000013 HC RX MED GY IP 250 OP 250 PS 637: Performed by: THORACIC SURGERY (CARDIOTHORACIC VASCULAR SURGERY)

## 2021-11-22 PROCEDURE — 87102 FUNGUS ISOLATION CULTURE: CPT | Performed by: INTERNAL MEDICINE

## 2021-11-22 PROCEDURE — 87305 ASPERGILLUS AG IA: CPT | Performed by: INTERNAL MEDICINE

## 2021-11-22 PROCEDURE — 71250 CT THORAX DX C-: CPT

## 2021-11-22 PROCEDURE — 250N000013 HC RX MED GY IP 250 OP 250 PS 637: Performed by: ANESTHESIOLOGY

## 2021-11-22 PROCEDURE — 87116 MYCOBACTERIA CULTURE: CPT | Performed by: INTERNAL MEDICINE

## 2021-11-22 PROCEDURE — 87305 ASPERGILLUS AG IA: CPT | Performed by: ANESTHESIOLOGY

## 2021-11-22 PROCEDURE — 97530 THERAPEUTIC ACTIVITIES: CPT | Mod: GP

## 2021-11-22 PROCEDURE — 94640 AIRWAY INHALATION TREATMENT: CPT | Mod: 76

## 2021-11-22 PROCEDURE — 87798 DETECT AGENT NOS DNA AMP: CPT | Performed by: INTERNAL MEDICINE

## 2021-11-22 PROCEDURE — 250N000011 HC RX IP 250 OP 636: Performed by: STUDENT IN AN ORGANIZED HEALTH CARE EDUCATION/TRAINING PROGRAM

## 2021-11-22 PROCEDURE — 85027 COMPLETE CBC AUTOMATED: CPT | Performed by: THORACIC SURGERY (CARDIOTHORACIC VASCULAR SURGERY)

## 2021-11-22 PROCEDURE — 250N000011 HC RX IP 250 OP 636

## 2021-11-22 PROCEDURE — 87210 SMEAR WET MOUNT SALINE/INK: CPT | Performed by: INTERNAL MEDICINE

## 2021-11-22 PROCEDURE — 97110 THERAPEUTIC EXERCISES: CPT | Mod: GP

## 2021-11-22 PROCEDURE — 200N000002 HC R&B ICU UMMC

## 2021-11-22 PROCEDURE — 999N000157 HC STATISTIC RCP TIME EA 10 MIN

## 2021-11-22 PROCEDURE — 82803 BLOOD GASES ANY COMBINATION: CPT | Performed by: INTERNAL MEDICINE

## 2021-11-22 PROCEDURE — 97530 THERAPEUTIC ACTIVITIES: CPT | Mod: GO

## 2021-11-22 PROCEDURE — 87449 NOS EACH ORGANISM AG IA: CPT | Performed by: NURSE PRACTITIONER

## 2021-11-22 PROCEDURE — 99233 SBSQ HOSP IP/OBS HIGH 50: CPT | Mod: 25 | Performed by: INTERNAL MEDICINE

## 2021-11-22 PROCEDURE — 99232 SBSQ HOSP IP/OBS MODERATE 35: CPT | Mod: 24 | Performed by: ANESTHESIOLOGY

## 2021-11-22 PROCEDURE — 250N000013 HC RX MED GY IP 250 OP 250 PS 637

## 2021-11-22 PROCEDURE — 87385 HISTOPLASMA CAPSUL AG IA: CPT | Performed by: INTERNAL MEDICINE

## 2021-11-22 PROCEDURE — 250N000012 HC RX MED GY IP 250 OP 636 PS 637: Performed by: NURSE PRACTITIONER

## 2021-11-22 PROCEDURE — 94003 VENT MGMT INPAT SUBQ DAY: CPT

## 2021-11-22 PROCEDURE — 94668 MNPJ CHEST WALL SBSQ: CPT

## 2021-11-22 PROCEDURE — 87205 SMEAR GRAM STAIN: CPT | Performed by: INTERNAL MEDICINE

## 2021-11-22 PROCEDURE — 250N000009 HC RX 250

## 2021-11-22 PROCEDURE — 250N000011 HC RX IP 250 OP 636: Performed by: ANESTHESIOLOGY

## 2021-11-22 PROCEDURE — 87449 NOS EACH ORGANISM AG IA: CPT | Performed by: ANESTHESIOLOGY

## 2021-11-22 PROCEDURE — 97110 THERAPEUTIC EXERCISES: CPT | Mod: GO

## 2021-11-22 PROCEDURE — 80197 ASSAY OF TACROLIMUS: CPT | Performed by: NURSE PRACTITIONER

## 2021-11-22 PROCEDURE — 87081 CULTURE SCREEN ONLY: CPT | Performed by: INTERNAL MEDICINE

## 2021-11-22 PROCEDURE — 94640 AIRWAY INHALATION TREATMENT: CPT

## 2021-11-22 PROCEDURE — 250N000011 HC RX IP 250 OP 636: Performed by: THORACIC SURGERY (CARDIOTHORACIC VASCULAR SURGERY)

## 2021-11-22 PROCEDURE — 89050 BODY FLUID CELL COUNT: CPT | Performed by: INTERNAL MEDICINE

## 2021-11-22 PROCEDURE — 31624 DX BRONCHOSCOPE/LAVAGE: CPT

## 2021-11-22 PROCEDURE — 250N000013 HC RX MED GY IP 250 OP 250 PS 637: Performed by: NURSE PRACTITIONER

## 2021-11-22 PROCEDURE — 87070 CULTURE OTHR SPECIMN AEROBIC: CPT | Performed by: INTERNAL MEDICINE

## 2021-11-22 PROCEDURE — 87385 HISTOPLASMA CAPSUL AG IA: CPT | Performed by: ANESTHESIOLOGY

## 2021-11-22 PROCEDURE — 84100 ASSAY OF PHOSPHORUS: CPT | Performed by: THORACIC SURGERY (CARDIOTHORACIC VASCULAR SURGERY)

## 2021-11-22 PROCEDURE — 250N000009 HC RX 250: Performed by: STUDENT IN AN ORGANIZED HEALTH CARE EDUCATION/TRAINING PROGRAM

## 2021-11-22 PROCEDURE — 87206 SMEAR FLUORESCENT/ACID STAI: CPT | Performed by: INTERNAL MEDICINE

## 2021-11-22 PROCEDURE — 85610 PROTHROMBIN TIME: CPT | Performed by: THORACIC SURGERY (CARDIOTHORACIC VASCULAR SURGERY)

## 2021-11-22 PROCEDURE — 89051 BODY FLUID CELL COUNT: CPT | Performed by: INTERNAL MEDICINE

## 2021-11-22 PROCEDURE — 71250 CT THORAX DX C-: CPT | Mod: 26 | Performed by: RADIOLOGY

## 2021-11-22 PROCEDURE — 83735 ASSAY OF MAGNESIUM: CPT | Performed by: THORACIC SURGERY (CARDIOTHORACIC VASCULAR SURGERY)

## 2021-11-22 PROCEDURE — 250N000011 HC RX IP 250 OP 636: Performed by: INTERNAL MEDICINE

## 2021-11-22 RX ORDER — MAGNESIUM SULFATE HEPTAHYDRATE 40 MG/ML
2 INJECTION, SOLUTION INTRAVENOUS ONCE
Status: COMPLETED | OUTPATIENT
Start: 2021-11-22 | End: 2021-11-22

## 2021-11-22 RX ORDER — QUETIAPINE FUMARATE 50 MG/1
50 TABLET, FILM COATED ORAL AT BEDTIME
Status: DISCONTINUED | OUTPATIENT
Start: 2021-11-22 | End: 2021-12-13 | Stop reason: HOSPADM

## 2021-11-22 RX ORDER — FENTANYL CITRATE 50 UG/ML
50-100 INJECTION, SOLUTION INTRAMUSCULAR; INTRAVENOUS
Status: COMPLETED | OUTPATIENT
Start: 2021-11-22 | End: 2021-11-22

## 2021-11-22 RX ORDER — QUETIAPINE FUMARATE 25 MG/1
25 TABLET, FILM COATED ORAL 2 TIMES DAILY
Status: DISCONTINUED | OUTPATIENT
Start: 2021-11-22 | End: 2021-11-22

## 2021-11-22 RX ORDER — LIDOCAINE HYDROCHLORIDE 10 MG/ML
INJECTION, SOLUTION EPIDURAL; INFILTRATION; INTRACAUDAL; PERINEURAL
Status: COMPLETED
Start: 2021-11-22 | End: 2021-11-22

## 2021-11-22 RX ORDER — FUROSEMIDE 10 MG/ML
40 INJECTION INTRAMUSCULAR; INTRAVENOUS 2 TIMES DAILY
Status: DISCONTINUED | OUTPATIENT
Start: 2021-11-22 | End: 2021-12-01

## 2021-11-22 RX ADMIN — Medication 40 MG: at 08:52

## 2021-11-22 RX ADMIN — ACETYLCYSTEINE 2 ML: 200 SOLUTION ORAL; RESPIRATORY (INHALATION) at 18:06

## 2021-11-22 RX ADMIN — Medication 1 TABLET: at 08:48

## 2021-11-22 RX ADMIN — Medication 40 MG: at 20:01

## 2021-11-22 RX ADMIN — Medication 1 PACKET: at 13:28

## 2021-11-22 RX ADMIN — ACETYLCYSTEINE 2 ML: 200 SOLUTION ORAL; RESPIRATORY (INHALATION) at 20:53

## 2021-11-22 RX ADMIN — QUETIAPINE FUMARATE 25 MG: 25 TABLET ORAL at 08:48

## 2021-11-22 RX ADMIN — MYCOPHENOLATE MOFETIL 1000 MG: 200 POWDER, FOR SUSPENSION ORAL at 08:53

## 2021-11-22 RX ADMIN — LEVOTHYROXINE SODIUM 25 MCG: 0.03 TABLET ORAL at 08:49

## 2021-11-22 RX ADMIN — PREDNISONE 10 MG: 5 TABLET ORAL at 20:00

## 2021-11-22 RX ADMIN — Medication 1 PACKET: at 08:49

## 2021-11-22 RX ADMIN — NYSTATIN 1000000 UNITS: 500000 SUSPENSION ORAL at 20:00

## 2021-11-22 RX ADMIN — MULTIVIT AND MINERALS-FERROUS GLUCONATE 9 MG IRON/15 ML ORAL LIQUID 15 ML: at 08:48

## 2021-11-22 RX ADMIN — INSULIN ASPART 2 UNITS: 100 INJECTION, SOLUTION INTRAVENOUS; SUBCUTANEOUS at 04:04

## 2021-11-22 RX ADMIN — INSULIN ASPART 1 UNITS: 100 INJECTION, SOLUTION INTRAVENOUS; SUBCUTANEOUS at 20:04

## 2021-11-22 RX ADMIN — PIPERACILLIN SODIUM AND TAZOBACTAM SODIUM 4.5 G: 4; .5 INJECTION, POWDER, LYOPHILIZED, FOR SOLUTION INTRAVENOUS at 16:32

## 2021-11-22 RX ADMIN — PIPERACILLIN SODIUM AND TAZOBACTAM SODIUM 4.5 G: 4; .5 INJECTION, POWDER, LYOPHILIZED, FOR SOLUTION INTRAVENOUS at 22:26

## 2021-11-22 RX ADMIN — LEVALBUTEROL HYDROCHLORIDE 1.25 MG: 1.25 SOLUTION RESPIRATORY (INHALATION) at 18:06

## 2021-11-22 RX ADMIN — CALCIUM CARBONATE 600 MG (1,500 MG)-VITAMIN D3 400 UNIT TABLET 1 TABLET: at 17:57

## 2021-11-22 RX ADMIN — SODIUM CHLORIDE, PRESERVATIVE FREE 5 ML: 5 INJECTION INTRAVENOUS at 17:08

## 2021-11-22 RX ADMIN — NYSTATIN 1000000 UNITS: 500000 SUSPENSION ORAL at 16:32

## 2021-11-22 RX ADMIN — LIDOCAINE HYDROCHLORIDE 300 MG: 10 INJECTION, SOLUTION EPIDURAL; INFILTRATION; INTRACAUDAL; PERINEURAL at 14:13

## 2021-11-22 RX ADMIN — QUETIAPINE FUMARATE 50 MG: 50 TABLET ORAL at 20:00

## 2021-11-22 RX ADMIN — FUROSEMIDE 40 MG: 10 INJECTION INTRAMUSCULAR; INTRAVENOUS at 16:32

## 2021-11-22 RX ADMIN — INSULIN GLARGINE 30 UNITS: 100 INJECTION, SOLUTION SUBCUTANEOUS at 20:00

## 2021-11-22 RX ADMIN — SODIUM CHLORIDE, PRESERVATIVE FREE 5 ML: 5 INJECTION INTRAVENOUS at 04:02

## 2021-11-22 RX ADMIN — TACROLIMUS 2.5 MG: 5 CAPSULE ORAL at 17:57

## 2021-11-22 RX ADMIN — LEVALBUTEROL HYDROCHLORIDE 1.25 MG: 1.25 SOLUTION RESPIRATORY (INHALATION) at 13:39

## 2021-11-22 RX ADMIN — FLUCONAZOLE IN SODIUM CHLORIDE 400 MG: 2 INJECTION, SOLUTION INTRAVENOUS at 11:48

## 2021-11-22 RX ADMIN — POTASSIUM CHLORIDE 40 MEQ: 40 SOLUTION ORAL at 08:48

## 2021-11-22 RX ADMIN — LABETALOL HYDROCHLORIDE 20 MG: 5 INJECTION, SOLUTION INTRAVENOUS at 17:08

## 2021-11-22 RX ADMIN — TACROLIMUS 2 MG: 5 CAPSULE ORAL at 08:54

## 2021-11-22 RX ADMIN — HYDROXYZINE HYDROCHLORIDE 50 MG: 50 TABLET, FILM COATED ORAL at 02:23

## 2021-11-22 RX ADMIN — NYSTATIN 1000000 UNITS: 500000 SUSPENSION ORAL at 08:48

## 2021-11-22 RX ADMIN — MAGNESIUM SULFATE IN WATER 2 G: 40 INJECTION, SOLUTION INTRAVENOUS at 08:49

## 2021-11-22 RX ADMIN — HYDROXYZINE HYDROCHLORIDE 50 MG: 50 TABLET, FILM COATED ORAL at 09:42

## 2021-11-22 RX ADMIN — ROSUVASTATIN CALCIUM 10 MG: 10 TABLET, FILM COATED ORAL at 08:49

## 2021-11-22 RX ADMIN — PREDNISONE 12.5 MG: 2.5 TABLET ORAL at 08:49

## 2021-11-22 RX ADMIN — NYSTATIN 1000000 UNITS: 500000 SUSPENSION ORAL at 11:48

## 2021-11-22 RX ADMIN — MIDAZOLAM 0.5 MG: 1 INJECTION INTRAMUSCULAR; INTRAVENOUS at 14:27

## 2021-11-22 RX ADMIN — QUETIAPINE FUMARATE 25 MG: 25 TABLET ORAL at 13:31

## 2021-11-22 RX ADMIN — LEVALBUTEROL HYDROCHLORIDE 1.25 MG: 1.25 SOLUTION RESPIRATORY (INHALATION) at 07:50

## 2021-11-22 RX ADMIN — LEVALBUTEROL HYDROCHLORIDE 1.25 MG: 1.25 SOLUTION RESPIRATORY (INHALATION) at 20:53

## 2021-11-22 RX ADMIN — SULFAMETHOXAZOLE AND TRIMETHOPRIM 1 TABLET: 400; 80 TABLET ORAL at 08:49

## 2021-11-22 RX ADMIN — ESCITALOPRAM 5 MG: 5 TABLET, FILM COATED ORAL at 08:49

## 2021-11-22 RX ADMIN — Medication 10 MG: at 20:00

## 2021-11-22 RX ADMIN — CALCIUM CARBONATE 600 MG (1,500 MG)-VITAMIN D3 400 UNIT TABLET 1 TABLET: at 08:48

## 2021-11-22 RX ADMIN — INSULIN ASPART 1 UNITS: 100 INJECTION, SOLUTION INTRAVENOUS; SUBCUTANEOUS at 16:41

## 2021-11-22 RX ADMIN — ONDANSETRON 4 MG: 2 INJECTION INTRAMUSCULAR; INTRAVENOUS at 09:25

## 2021-11-22 RX ADMIN — ACETYLCYSTEINE 2 ML: 200 SOLUTION ORAL; RESPIRATORY (INHALATION) at 13:39

## 2021-11-22 RX ADMIN — PIPERACILLIN SODIUM AND TAZOBACTAM SODIUM 4.5 G: 4; .5 INJECTION, POWDER, LYOPHILIZED, FOR SOLUTION INTRAVENOUS at 04:04

## 2021-11-22 RX ADMIN — ACETYLCYSTEINE 2 ML: 200 SOLUTION ORAL; RESPIRATORY (INHALATION) at 07:50

## 2021-11-22 RX ADMIN — INSULIN GLARGINE 30 UNITS: 100 INJECTION, SOLUTION SUBCUTANEOUS at 08:53

## 2021-11-22 RX ADMIN — LABETALOL HYDROCHLORIDE 20 MG: 5 INJECTION, SOLUTION INTRAVENOUS at 01:10

## 2021-11-22 RX ADMIN — MYCOPHENOLATE MOFETIL 1000 MG: 200 POWDER, FOR SUSPENSION ORAL at 20:01

## 2021-11-22 RX ADMIN — PIPERACILLIN SODIUM AND TAZOBACTAM SODIUM 4.5 G: 4; .5 INJECTION, POWDER, LYOPHILIZED, FOR SOLUTION INTRAVENOUS at 10:16

## 2021-11-22 RX ADMIN — FENTANYL CITRATE 50 MCG: 50 INJECTION, SOLUTION INTRAMUSCULAR; INTRAVENOUS at 14:26

## 2021-11-22 ASSESSMENT — ACTIVITIES OF DAILY LIVING (ADL)
ADLS_ACUITY_SCORE: 29
ADLS_ACUITY_SCORE: 20
ADLS_ACUITY_SCORE: 29
ADLS_ACUITY_SCORE: 27
ADLS_ACUITY_SCORE: 29
ADLS_ACUITY_SCORE: 29
ADLS_ACUITY_SCORE: 27
ADLS_ACUITY_SCORE: 29
ADLS_ACUITY_SCORE: 27
ADLS_ACUITY_SCORE: 29
ADLS_ACUITY_SCORE: 27
ADLS_ACUITY_SCORE: 27
ADLS_ACUITY_SCORE: 29
ADLS_ACUITY_SCORE: 29
ADLS_ACUITY_SCORE: 27

## 2021-11-22 ASSESSMENT — MIFFLIN-ST. JEOR: SCORE: 1418.22

## 2021-11-22 NOTE — PROGRESS NOTES
Pulmonary Medicine  Cystic Fibrosis - Lung Transplant Team  Progress Note  2021       Patient: Edson Thornton  MRN: [id number]  : 1965 (age 56 year old)  Transplant: 10/16/2021 (Lung), POD#37  Admission date: 2021    Assessment & Plan:     Edson Thornton is a 56 year old male with a PMH significant for NSIP/ILD, bronchiectasis, moderate PH, RA, SALTY, chronic HSV infection, hypogammaglobulinemia, steroid-induced diabetes, hypothyroidism, PFO, HTN, HLD, duodenal anomaly, anxiety, and depression.  Admitted on 21 from OSH for acute on chronic respiratory failure 2/2 ILD exacerbation, now s/p BSLT on 10/16/21.  Prolonged vent wean s/p trach and PEG/J tube placement with thoracic surgery 10/29.  Post-op course otherwise complicated by encephalopathy and diffuse weakness, acute to subacute CVA, afib with RVR, BRENNAN, GI bleed, Candidemia/Candida empyema, and anxiety.  Code called  for bradycardia/asystole, progressive hypotensive, found to have significant GI bleed, EGD with adherent clot near PEG tube site that was clipped.  Tolerating TD trials since  and ongoing improvement in mentation and weakness.      Today's recommendations:    - TD as tolerated but low threshold to put back on BiPAP as needed  - Diuresis per ICU team  - Tacrolimus repeat level  therapeutic, no dose adjustment. Repeat level  (ordered)  - Prednisone taper  not ordered  - Coccidioides Ag  serum pending  - Continue Zosyn for 2 week course through   - Follow pending pleural fungal cultures and fungal/bacterial blood cultures from   - Continue fluconazole (reevaluate around  pending repeat fungal pleural cultures), EKG weekly for QTc monitoring (, ordered)  - Plan for Bronch today for BAL cultures given CT chest results as below  - Primary team to reach out to Transplant ID regarding CT imaging for recommendations on antibiotic coverage and additional cultures from today's bronch  -  Serum Histoplasma, Blastomyces, BDG fungitell, and Aspergillus galactomannan Ag (11/22) pending  - Repeat IgG (12/15, ordered)  - DSA monitoring q2 weeks, (11/29) ordered     S/p BSLT for ILD:  Acute hypoxic respiratory failure s/p trach:   Bilateral pleural effusions: Unfortunately had not received vaccination for flu, PNA, or COVID-19 PTA.  Explant pathology with NSIP, no malignancy.  PGD 2-3.  Weaned off paralytic 10/19 (for vent dyssynchrony) and Caroline 10/22.  Initial difficulty weaning sedation given agitation then with neurological findings as below.  Prolonged vent wean s/p trach and PEG/J tube placement with thoracic surgery 10/29.  Code called 11/2 for bradycardia/asystole (required 1 round of CPR, no medications) then progressively hypotensive, GI bleed as below.  Chest CT 11/2 with increased bilateral pleural effusions (moderate left, small right), bibasilar atelectasis with area of hypoenhancing parenchyma in LLL (suspicious for infection), and numerous nondisplaced anterior rib fractures bilaterally.  S/p left chest tube placement in IR 11/3 for pleural effusion/empyema (as below), right deferred given very little effusion on US.  Problems with trach cuff leak 11/3 (requiring multiple exchanges), bronch 11/14 with trach in good position with cuff well sealed in trachea and small to moderate secretions mostly in lower lobes. Has tolerated continuous TD 40% since 11/20.   - Nebs: levalbuterol and Mucomyst QID  - Pulmonary toilet with chest physiotherapy QID  - TD as tolerated otherwise bipap for support  - Diuresis per ICU team  - Repeat CT chest (11/22) New cavitary lesion in the left lung base, and with multifocal bilateral upper lobe GGO (some of which are new), new 1.8 cm fluid collection with surrounding fat stranding in the left axilla, decreased bilateral loculated pleural effusions (personally reviewed with Dr. Mckeon).   - Plan for Bronch today for BAL cultures given CT chest results  - CXR daily  while chest tube in place, will review today's when completed     Immunosuppression: s/p induction therapy with basiliximab 10/16 (and high dose IV steroid) and 10/20  - Tacrolimus 2 mg BID (suspension, via G tube).  Goal level 8-12. Repeat level 11/22 therapeutic at 10.3, no dose adjustment. Repeat level 11/26 (ordered)  - MMF 1000 mg BID (11/2, decreased given GI bleed, AZA to be avoided given TPMT)  - Prednisolone at 12.5 mg qAM, 10 mg qPM, next taper due 12/5 (not ordered)  Date AM dose (mg) PM dose (mg)   11/21/21 12.5 10   12/5/21 10 10   1/2/22 10 7.5   1/30/22 7.5 7.5   2/27/22 7.5 5   3/27/22 5 5   4/24/22 5 2.5      Prophylaxis:   - Bactrim for PJP ppx (held 11/2-11/5 d/t BRENNAN)  - Nystatin for oral candidiasis ppx, 6 month course  - See below for serologies and viral ppx:    Donor Recipient Intervention   CMV status Negative Negative None, CMV monthly (due 12/14, neg 11/16)   EBV status Positive Positive None, EBV monthly (due 12/14, neg 11/16)   HSV status N/A Positive S/p acyclovir POD #1-30 (recent infection history pre-txp)      ID: Concern for possible Strongyloides exposure pre-transplant s/p ivermectin x1 dose (9/17).  Donor and recipient cultures NGTD.  S/p IV ceftazidime/vancomycin for 48h per protocol and additional empiric ceftazidime 10/19-10/23 given recurrent fevers as below.  Cryptococcal Ag negative 10/23.   - Monthly Coccidioides Ag x12 months post-transplant per ID (urine and serum negative 10/17), 11/17 urine neg, serum pending  - Serum Histoplasma and Blastomyces (11/22) pending  - Primary team to discuss CT imaging with Transplant ID for recommendations on antibiotic coverage and additional cultures from today's bronch    Recurring fevers, Resolved:  Fevers post-op, Tmax 101.7 POD #1.  Febrile with worsening leukocytosis again 10/25, generally persisting.  LP (10/29), xanthochromic with pleocytosis thought to be appropriate given RBC and WBCs, no ABX recommended per transplant ID and  neurology.  S/p empiric meropenem 10/28-11/2.  Now afebrile, ABX as above.     Klebsiella pneumoniae:  Pseudomonas fluorescens/putida: Klebsiella initially noted trach sputum culture 11/10.  Started on ceftriaxone 11/11, transitioned to ertapenem 11/12-11/13, and back to ceftriaxone 11/14.  Bronch culture 11/12 with Klebsiella and Pseudomonas fluorescens/putida (R-meropenem).     - ABX: Zosyn (11/15) for 2 week course through 11/29      Disseminated Candida: Noted on blood cultures 10/20 and 10/22.  BDG fungitell positive (399) on 10/20.  Respiratory cultures with persistent Candida albicans.  TC 10/23 without evidence of endocarditis.  Ophthalmology consult 10/24 with benign dilated fundoscopic exam.  Candida empyema also noted 10/25, chest tubes inadvertently removed by CVTS 10/28, left chest tube replaced by IR 11/3 as above with ongoing Candida on cultures.   - BDG fungitell and Aspergillus galactomannan Ag (11/22)  pending  - Repeat pleural fungal cultures and fungal/bacterial blood cultures (11/18), NGTD  - Fluconazole (10/26 per transplant ID, prior micafungin 10/22-10/27), repeat EKG for QTc monitoring 11/23 (ordered), LFTs as below (see transplant ID note 11/5).     HSV: Chronic intermittent active infection pre-transplant with recent HSV infection: crusted lesions throughout left side of jaw, s/p 10 day treatment course of ACV through 10/9.  HSV PCR blood negative 10/17.  S/p ACV ppx as above (started POD #1 instead of POD #8 given HSV history and location).     Hypogammaglobulinemia: IgG previously low at 364 (9/7).  Noted at 265 at time of transplant, s/p IVIG 10/21, repeat IgG (11/17) 198, s/p IVIG (with premedication) 11/18.  - Repeat IgG (12/15, ordered)     Positive cross match: Note that he received two doses of rituximab in June, which is likely contributing to cross match result.  DSA most recently negative 11/15.  - DSA monitoring q2 weeks, (11/29) ordered     SALTY: Noted pre-transplant.  Home  CPAP 6-12 cm H2O.  - Evaluate need for BiPAP after decannulation.     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  MRI 10/23 with multifocal subacute infarcts within both cerebral hemispheres and left cerebellum.  DDx include surgery v embolic v infectious.  Heparin drip started 10/23 (intermittently held with GI bleed as below).  Repeat stroke code 10/25 d/t marked decrease in responsiveness with sedation wean, pupil inequality, and absent gag reflex.  CTA head without obvious new pathology, MRI brain (with and without contrast) primarily revealing for infarct, low likelihood of PRES.  Ammonia normal.  VEEG per neuro with severe diffuse encephalopathy.  Gradual improvement noted since 10/29, repeat head CT 11/2 (following code) without new acute intracranial abnormalities.  - AC management per primary team as below  - PT/OT     Afib with RVR: Noted 10/18, started on amiodarone drip and converted to SR, transitioned to PO 10/21, decreased 10/29. Metoprolol and amiodarone discontinued 11/20 d/t intermittent bradycardia. AC per primary team.     Right subclavian DVT: Seen on UE US from 11/4.  Heparin drip resumed 11/5, transitioned to warfarin 11/20 and in the setting of thrombocytopenia. HIT negative (11/21)      Recurrent GI bleed, Resolved: Hemoglobin dropped to 6.6 10/22, s/p 2 units pRBC.  OG tube with bloody output, EGD 10/23 noted NJ/OG tube trauma with scant oozing.  Progressive hypotension 11/2, hemoglobin 5.8 with bloody G tube output.  Heparin drip held, transitioned to PPI drip, and MTP activated.  EGD 11/2 with large amount of clotted blood in stomach and area of raised mucosa with small adherent clot near PEG tube site that was clipped,  "no active bleeding.  Maroon stools 11/3, repeat EGD with extensive old blood in stomach, no active bleeding, small nodular area with prior clips clipped again.  Most recent EGD 11/5 with ulcer noted at PEG tube bumper site, gastritis, and suction marks from G tube. TF noted in G tube drainage bag 11/7, AXR with GJ tube tip projecting over proximal duodenum. GJ tube exchanged 11/9 by GI.      Elevated LFTs: Shock liver post-op, now resolved.  Intermittent alk phos elevation.     Hypomagnesemia: Suppressed reabsorption 2/2 CNI.  Requiring nearly daily IV replacement.  - IV and PO magnesium supplementation      We appreciate the excellent care provided by the CVICU and CVTS teams.  Recommendations communicated via in person rounding and this note.  Will continue to follow along closely, please do not hesitate to call with any questions or concerns.     Patient discussed with Dr. Mike Jeffrey, APRN, CNP   Inpatient Nurse Practitioner  Pulmonary CF/Transplant  Pager #[phone]       Subjective & Interval History:     Breathing comfortably on TD 40%, 40L. Minimal cough. Denies pain. Reports anxiety better controlled. No nausea, reflux, or abdominal discomfort. Loose stools continue, improving. Rectal tube removed by patient. CPT x3 yesterday. Left chest tube remains, no output yesterday.    Review of Systems:     ROS as above otherwise limited due to communication barriers    Physical Exam:     Vital signs:  Temp: 99.3  F (37.4  C) Temp src: Axillary BP: (!) 142/90 Pulse: 82   Resp: 18 SpO2: 95 % O2 Device: Trach dome Oxygen Delivery: 40 LPM Height: 162.6 cm (5' 4\") Weight: 67.7 kg (149 lb 4.8 oz)  I/O:     Intake/Output Summary (Last 24 hours) at 11/22/2021 1155  Last data filed at 11/22/2021 1100  Gross per 24 hour   Intake 2610 ml   Output 1155 ml   Net 1455 ml     Constitutional: lying in bed, in no apparent distress.   HEENT: Eyes with pink conjunctivae, anicteric.  Oral mucosa moist without lesions.  " Trache cdi  PULM: Fair air flow bilaterally.  Bibasilar crackles, no rhonchi, no wheezes.  Non-labored breathing on TD.  CV: Normal S1 and S2.  RRR.  No murmur, gallop, or rub.  No peripheral edema.   ABD: NABS, soft, nontender, nondistended.  PEG/J not visualized  MSK: Able to squeeze left > right hand and move BLE.  + apparent muscle wasting.   NEURO: Alert, follows commands, nonverbal communication by head nod/shake  SKIN: Warm, dry.  No rash on limited exam.   PSYCH: Mood stable.     Lines, Drains, and Devices:  Peripheral IV 11/02/21 Anterior;Right Upper forearm (Active)   Site Assessment WDL 11/22/21 0800   Line Status Saline locked 11/22/21 0800   Dressing Intervention Dressing reinforced 11/08/21 2000   Phlebitis Scale 0-->no symptoms 11/22/21 0800   Infiltration Scale 0 11/22/21 0800   Infiltration Site Treatment Method  None 11/21/21 0400   Number of days: 20       PICC Triple Lumen 11/04/21 Left Basilic Access. PICC okay to use. (Active)   Site Assessment [facility] 11/22/21 0800   External Cath Length (cm) 1 cm 11/04/21 1747   Extremity Circumference (cm) 28 cm 11/21/21 0000   Dressing Intervention Chlorhexidine patch;Transparent 11/22/21 0800   Dressing Change Due 11/28/21 11/22/21 0800   Mosley - Status saline locked 11/22/21 0800   Mosley - Cap Change Due 11/23/21 11/22/21 0800   Red - Status infusing 11/22/21 0800   Red - Cap Change Due 11/23/21 11/22/21 0800   White - Status saline locked 11/22/21 0800   White - Cap Change Due 11/23/21 11/22/21 0800   Extravasation? No 11/17/21 2000   Line Necessity Yes, meets criteria 11/22/21 0800   Number of days: 18     Data:     LABS    CMP:   Recent Labs   Lab 11/22/21  0858 11/22/21  0403 11/22/21  0359 11/21/21  2340 11/21/21  0336 11/21/21  0333 11/20/21  1639 11/20/21  1635 11/20/21  0807 11/20/21  0318 11/19/21  0345 11/19/21  0341   NA  --   --  144  144  --   --  141  141  --  150*  --  144  144   < > 139   POTASSIUM  --   --  4.4  4.4  --   --  3.8  3.8  --   3.6  --  3.5  3.5   < > 4.0   CHLORIDE  --   --  112*  112*  --   --  106  106  --  112*  --  109  109   < > 101   CO2  --   --  26  26  --   --  29  29  --  26  --  28  28   < > 32   ANIONGAP  --   --  6  6  --   --  6  6  --  12  --  7  7   < > 6   * 170* 191*  191* 166*   < > 204*  204*   < > 105*   < > 180*  180*   < > 160*   BUN  --   --  53*  53*  --   --  55*  55*  --  43*  --  36*  36*   < > 47*   CR  --   --  0.82  0.82  --   --  0.86  0.86  --  0.76  --  0.72  0.72   < > 0.68   GFRESTIMATED  --   --  >90  >90  --   --  >90  >90  --  >90  --  >90  >90   < > >90   ERIK  --   --  9.4  9.4  --   --  9.0  9.0  --  8.0*  --  7.7*  7.7*   < > 8.5   MAG  --   --  1.9  --   --  2.7*  --  2.0  --  1.5*  --  1.6   PHOS  --   --  4.0  --   --  4.1  --   --   --  4.1  --  3.6   PROTTOTAL  --   --  6.1*  --   --  6.4*  --   --   --  5.4*  --  5.6*   ALBUMIN  --   --  2.3*  --   --  2.4*  --   --   --  1.8*  --  1.9*   BILITOTAL  --   --  0.2  --   --  0.2  --   --   --  0.2  --  0.2   ALKPHOS  --   --  127  --   --  133  --   --   --  111  --  123   AST  --   --  22  --   --  20  --   --   --  13  --  18   ALT  --   --  30  --   --  29  --   --   --  23  --  29    < > = values in this interval not displayed.     CBC:   Recent Labs   Lab 11/22/21  0359 11/21/21  0333 11/20/21  0318 11/19/21  0341   WBC 13.5* 12.2* 11.2* 11.1*   RBC 3.13* 3.02* 2.47* 2.58*   HGB 9.5* 9.2* 7.6* 8.0*   HCT 30.7* 30.1* 25.2* 25.0*   MCV 98 100 102* 97   MCH 30.4 30.5 30.8 31.0   MCHC 30.9* 30.6* 30.2* 32.0   RDW 16.6* 16.4* 16.4* 16.7*   PLT 81* 86* 78* 100*       INR:   Recent Labs   Lab 11/22/21  0359 11/21/21  0333 11/20/21 0318 11/19/21  0341   INR 3.47* 2.95* 2.12* 1.46*       Glucose:   Recent Labs   Lab 11/22/21  0858 11/22/21  0403 11/22/21  0359 11/21/21  2340 11/21/21  2032 11/21/21  1605   * 170* 191*  191* 166* 109* 168*       Blood Gas:   Recent Labs   Lab 11/22/21  0426 11/22/21  0103  11/21/21  1029   PHV 7.37 7.41 7.38   PCO2V 43 42 49   PO2V 34 36 34   HCO3V 25 27 29*   LORI -0.4 1.9 3.1*   O2PER 40 40 40       Culture Data No results for input(s): CULT in the last 168 hours.    Virology Data:   Lab Results   Component Value Date    FLUAH1 Negative 11/12/2021    FLUAH3 Negative 11/12/2021    [id number] Negative 11/12/2021    IFLUB Negative 11/12/2021    RSVA Negative 11/12/2021    RSVB Negative 11/12/2021    PIV1 Negative 11/12/2021    PIV2 Negative 11/12/2021    PIV3 Negative 11/12/2021    HMPV Negative 11/12/2021    HRVS Negative 11/12/2021    ADVBE Negative 11/12/2021    ADVC Negative 11/12/2021    ADVC Negative 10/17/2021       Historical CMV results (last 3 of prior testing):  Lab Results   Component Value Date    CMVQNT Not Detected 11/16/2021    CMVQNT Not Detected 11/12/2021    CMVQNT Not Detected 10/26/2021     No results found for: CMVLOG    Urine Studies    Recent Labs   Lab Test 11/01/21  1336 10/25/21  1507   URINEPH 5.5 5.5   NITRITE Negative Negative   LEUKEST Negative Negative   WBCU 0 2       Most Recent [place] Pulmonary Function Testing (FVC/FEV1 only)  No results found for: 20002  No results found for: 20003  No results found for: 20015  No results found for: 20016    IMAGING    Recent Results (from the past 48 hour(s))   XR Chest Port 1 View    Narrative    Exam: XR CHEST PORT 1 VIEW, 11/21/2021 4:28 AM    Comparison: Chest x-ray 11/20/2021    History: s/p lung transplant    Findings:  Single portable AP semiupright view of the chest is obtained.  Postoperative changes of bilateral lung transplantation, clamshell  sternotomy wires are intact. Tracheostomy tube tip is in the thoracic  trachea. Left upper extremity PICC tip terminates in the superior  cavoatrial junction. Left basilar chest tube in place.    Trachea is midline. Mediastinum is within normal limits.  Cardiopulmonary silhouette is within normal limits. Trace bilateral  effusions and associated atelectasis. No  appreciable pneumothorax. The  upper abdomen is unremarkable.      Impression    Impression:   1. Slightly reduced bilateral effusions.  2. Reduced left basilar atelectasis.  3. Slightly increased right basilar atelectasis.    I have personally reviewed the examination and initial interpretation  and I agree with the findings.    SHANDRA CEVALLOS MD         SYSTEM ID:  [id number]   CT Chest w/o Contrast    Narrative    EXAMINATION: CT CHEST W/O CONTRAST, 11/22/2021 6:02 AM    CLINICAL HISTORY: Abnormal xray - pleural effusion    COMPARISON: Radiograph 11/21/2021, 11/20/2021. CT 11/2/2021.    TECHNIQUE: CT imaging obtained through the chest without contrast.  Coronal, sagittal and axial MIP reformatted images obtained and  reviewed.     FINDINGS:    Lines, tubes, devices: Tracheostomy in place. Left upper extremity  PICC terminating at the superior cavoatrial junction. Left basilar  chest tube. Median sternotomy wires are intact.    Lungs: Post surgical changes of bilateral lung transplantation. The  central tracheobronchial tree is patent. Peripheral mucous plugging in  the bilateral lower lobes. Multifocal groundglass opacities some of  which are new compared to CT 11/2/2021. Interval development of a  cavitary lesion of the left lung base measuring 2.7 x 2.9 cm (series 6  image 190).  Decreased bilateral loculated pleural effusions with  associated compressive atelectasis.    Mediastinum: The visualized thyroid is unremarkable.Heart size is  enlarged. Small pericardial effusion. Mild coronary artery  calcifications. The thoracic aorta and main pulmonary artery are  within normal limits. Standard branching pattern of the great vessels.  No abnormal thoracic lymph nodes.    Bones and soft tissues: Subacute bilateral rib fractures. 1.2 x 1.8 cm  fluid collection in the left axilla with surrounding fat stranding  (series 2 image 27). No suspicious osseous lesion. Mild degenerative  changes of the thoracolumbar  spine.    Upper abdomen:  Limited evaluation of the upper abdomen. No acute  pathology of the visualized solid organs. Percutaneous  gastrojejunostomy tube.      Impression    IMPRESSION:   1. New cavitary lesion in the left lung base with multifocal  groundglass opacities some of which are new compared to CT 11/2/2021,  notably in the bilateral upper lobes concerning for ongoing infection.  2. Decreased bilateral loculated pleural effusions.  3. New 1.8 cm fluid collection with surrounding fat stranding in the  left axilla.  4. Similar small pericardial effusion.    I have personally reviewed the examination and initial interpretation  and I agree with the findings.    ARIES DELGADO MD         SYSTEM ID:  [id number]

## 2021-11-22 NOTE — PROGRESS NOTES
Care Management Follow Up    Length of Stay (days): 78    Expected Discharge Date:  TBD       Patient plan of care discussed at interdisciplinary rounds: Yes    Anticipated Discharge Disposition:  LTAC vs TCU     Anticipated Discharge Services:  Vent weaning and rehab    Additional Information:  Pt is s/p Lung transplant on 10/16/21.  Pt remain in ICU vented via trach and doing PS.  Pt is referred to [facility] for vent weaning.      RNCC visited pt and spouse, Peg for support.  Pt was sleeping at time of my visit and visited only with Peg.  Cristopher stated the team have been updating them well about the plan of care.  Peg stated she has moved to the apartment this weekend and she will be staying locally now.  Pt and spouse lives in [place].  Peg is aware of about the LTAC referral.  Anticipated pt will transfer to [facility] once stable and cleared by pulmonary team.   RNCC will cont to follow the plan of care.      Jennifer Pate RN, PHN, BSN  4A and 4E/ ICU  Care Coordinator  Phone: [phone]  Pager: [phone]    To get in touch with weekend & Holiday on call RN Care Coordinator  Page [phone] or Care Coordinator Job code/pager- [phone]

## 2021-11-22 NOTE — PROGRESS NOTES
"Bronchoscopy Risk Assessment Guidelines      A. Patient symptoms to consider when assessing pulmonary TB risk are:    I. Cough greater than 3 weeks; and fever, hemoptysis, pleuritic chest    pain, weight loss greater than 10 lbs, night sweats, fatigue, infiltrates on    upper lobes or superior segments of lower lobes, cavitation on chest    x-ray.   B. Patient risk factors to consider when assessing pulmonary TB risk are:    I. Exposure to known TB case, foreign-born persons (within 5 years of    arrival to [place]), residence in a crowded setting (correctional facility,     long-term care center, etc.), persons with HIV or immunosuppression.    Patients with symptoms and risk factors should generally be considered \"suspect risk\" and bronchoscopies should be performed in airborne precautions.    This patient has NO KNOWN RISK of Tuberculosis (proceed with bronchoscopy)    Specimens sent: yes  Complications: None none  Scope used: #[id number] Slim  90  Attending Physician: Zenia Shankar RT on 11/22/2021 at 2:55 PM  "

## 2021-11-22 NOTE — PROCEDURES
Procedure: Bronchoscopy    Indication: Lung transplant with new left LL cavity.    Universal protocol: Consent was obtained, signed and placed in the chart after risk benefit discussed and all questions answered to the best of my knowledged.  Patient Identification was verified, time out was performed, site marking N/A, Imaging data was reviewed personally prior to the procedure. Full Barrier precaution: Hands washed, mask, gloves, gown, cap and eye protection all used.    Narrative:  Recent History and Physical performed. Patient meds and allergies reviewed.   Preprocedure examination: Mental Status: baseline. Airway Examination: normal oropharyngeal airway, Mallampati Class II. Respiratory examination: Occ crackles. CV Exam: S1 and S2 heard. ASA Grade Assessment: II. After reviewing the risks and benefits, the patient was deemed in satisfactory condition to undergo the procedure. The anesthesia plan was to use moderate sedation / analgesia (conscious sedation). Immediately prior to administration of medications, the patient was re-assessed for adequacy to receive sedatives. The heart rate, respiratory rate, oxygen saturations, blood pressure, adequacy of pulmonary ventilation, and response to care were monitored throughout the procedure.     The following drugs were given i.v. for sedation: Fentanyl 25 microgr, Versed 0.5mg and supplemental oxygen was given to maintain the pulse o2 sat greater than 90%.  The upper airways were anesthetized using topical lidocaine 1% and the scope was inserted thru the noise. Additional lidocaine 4% was used to anesthetized the vocal cords. The scope was then advanced into the trachea and inspection of the accessible part of the bronchial tree was inspected.      Findings:   The trachea appeared normal. Both anastomosis are stable. There is a small divet at the antr wall of left anastomosis.    Procedure  In addition to inspection the following procedure(s) was/were  performed  1. BAL:  120 ml of saline was instilled into the left LL lateral segment and 30ml of blood tinged return. 60ml was instilled into left LL lateral segment and 10 - 15ml blood tinged return noted.     Complication:  no apparent complication, procedure well tolerated    Lab requested  A. BAL fluid : sent for cytology, cell differential, gram and fungeal staining. Bacterial, fungeal, AFB and viral cultures were also sent. A galactomannan sent.    Left mainstem anastomosis:      Right mainstem anastomosis:

## 2021-11-22 NOTE — PLAN OF CARE
"Pertinent PMH: \"Woody\" is being followed by [facility] who was admitted on 09/05 for ILD complications now s/p BSLT on 10/16 c/b CVA encephalopathy, acute resp failure, BRENNAN, multiple fungal lung infections, pleural effusion s/p CT and 11/12 PEA arrest d/t GIB s/p MTP.  Major Shift Events: A&OX4, neuro intact, denies pain, anxious overnight, PRN Seroquel and atarax administered. On trach dome settings all night, 40%/40L. VBGs are WDL. SR on tele. VSS, MAP 's, PRN labetalol administered for HTN. Incontinent of urine s/p external catheter applied. TF at 60mL/Hr c/ Q4Hr 50 free H2O, 300 rectal tube, Pt removed rectal tube. 10 mL CT OP in a 12 Hr period. CT chest completed (see results).  Plan: Continue to wean down trach dome, ambulate to chair. CT chest today. Transition to floor/LTAC when weaned from vent settings completely. Continue to monitor.     For complete vital signs, hemodynamics, medications/drips, and complete assessments please see documentation flowsheets  "

## 2021-11-22 NOTE — PROGRESS NOTES
CLINICAL NUTRITION SERVICES - REASSESSMENT NOTE     Nutrition Prescription    Recommendations:  Consider imodium     Malnutrition Status:    Severe malnutrition in the context of acute on chronic illness    Recommendations orderd by Registered Dietitian (RD):  - Trial alternative formula type and monitor tolerance:  Pivot 1.5 Conner @ 65ml/hr (1560ml/day) via Jtube will provide: 2340 kcals (37 kcal/kg), 146 g protein (2.3 g/kg), 1170 ml free H20, 268 g CHO, and 11 g fiber daily. This meets 100% estimated nutrition needs. Discontinued added protein and fiber modulars.        EVALUATION OF THE PROGRESS TOWARD GOALS   Diet: NPO (vented)   Nutrition Support: Vital 1.5 Conner @ 60ml/hr  (1440ml/day) + Prosource (1 pkt TID) + Banatrol (1 pkt BID) via Jtube to provide: 2280 kcals (36 kcal/kg), 130 g protein (2 g/kg), 1100 ml free H20, 269 g CHO, and 12 g fiber daily Micro/Nx: Ceravite     Intake: Met 100% needs on avg.     NEW FINDINGS   GI/Nutrition: No change in stool output (800-900 ml stool/day) despite change to semi-elemental formula (Vital) on 11/19. Concern for poor intestinal absorption. Alternatively, will trial high protein, semi-elemental formula which contains mixed fiber today.     MALNUTRITION  % Intake: No decreased intake noted  % Weight Loss: 13% in < 3 months (severe)  Subcutaneous Fat Loss: Facial region: Mild, Upper arm: Moderate, Lower arm: Mild and Thoracic/intercostal: Mild  Muscle Loss: Temporal: Mild, Scapular bone: Moderate, Thoracic region (clavicle, acromium bone, deltoid, trapezius, pectoral): Mild, Upper arm (bicep, tricep): Moderate, Lower arm  (forearm): Mild, Dorsal hand: Moderate, Upper leg (quadricep, hamstring): Mild, Patellar region: Mild and Posterior calf: Severe  Fluid Accumulation/Edema: None noted  Malnutrition Diagnosis: Severe malnutrition in the context of acute on chronic illness    Previous Goals   Total avg nutritional intake to meet a minimum of 30 kcal/kg and 1.3 g PRO/kg daily  (per dosing wt 64 kg).  Evaluation: Met    Previous Nutrition Diagnosis  Malnutrition   Evaluation: No change    CURRENT NUTRITION DIAGNOSIS  Malnutrition related to hx of inadequate nutrition intake as evidenced by wt loss, fat loss, muscle loss.       INTERVENTIONS  See interventions at top of progress note.     Goals  Total avg nutritional intake to meet a minimum of  30 kcal/kg and 1.3 g PRO/kg daily (per dosing wt 64 kg).    Monitoring/Evaluation  Progress toward goals will be monitored and evaluated per protocol.    Sona Amezquita, ARMOND, LD  [id number]  Pgr: [phone]

## 2021-11-22 NOTE — PROGRESS NOTES
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  UGIB causing code blue on 11/2.      OVERNIGHT EVENTS:  - anxious appearing, seroquel PRN      TODAY'S PROGRESS:  - trach dome over the weekend, tolerated well  - increase lasix to 40mg BID    PLAN:  Neuro/ pain/ sedation:  Acute postoperative pain   Anxiety and depression  Acute to subacute CVA, b/l cerebral hemispheres and L cerebellum, suspect embolic  Encephalopathy and diffuse weakness - improving slightly   R ear lateral canal floor excoriation with bleeding - resolved   - Tylenol and oxycodone prn, lido patch, PRN dilaudid  - PTA lexapro  - Seroquel 25 BID with 50mg HS dose               -PRN Seroquel    Pulmonary care:   SALTY on home CPAP  Acute hypoxic respiratory failure s/p b/l lung transplant 10/16/21  S/p tracheostomy 10/29  Empyema with Candida s/p chest tube 11/3/21  - trach dome tolerated for >48 hours  - Levalbuterol nebs scheduled and Mucomyst, chest PT  - Daily CXR  - appreciate Pulmonary    Cardiovascular:    HTN  Afib   PFO  Vasoplegic shock in setting of hypovolemia - resolved  - Monitor hemodynamic status.   - MAP goal <100, SBP <140  - prn labetalol  - rosuvastatin 10 mg  - Amiodarone 200 mg daily discontinued    GI care:   Elevated LFTs - recurrent  GI bleed 2/2 NG trauma, now recurrent secondary to GJ bumper ulcer   Moderate malnutrition in the context of acute on chronic illness  PEG-J, with  malpositioned J tube   - Diet: TF  - PPI BID    Fluids/ Electrolytes/ Nutrition:   Hypernatremia  Hyperkalemia, resolved   BL creat appears to be ~ 0.7  - free water flushes  - Goal 0 to -50  - Strict I/O, daily weights  - Avoid/limit nephrotoxins as able  - Replete lytes PRN per protocol  - Diurese - lasix 40mg BID    Endocrine:    Stress induced hyperglycemia with steroid-induced component, on DM2  Hypothyroidism  RA  Preop A1c 6.6  - Goal BG <180 for optimal healing  - continue home synthroid  - lantus 30 BID, sliding scale insulin                 - Endocrine signed off    ID/ Antibiotics:  Immunosuppression s/p transplant  Candidal infection of donor lungs (likely source), pleural fluid, and fungemia   PNA  New cavitary lesion in LLL  - NGTD CSF. Last + blood cx 10/22. No vegetations on valves per TC 10/23  - Nystatin, Acyclovir, bactrim   - Tacrolimus (via g tube), prednisone    - appreciate transplant ID, ophthalmology    - d/w ID, no indication to treat Staph epi in sputum   - Fluconazole for fungemia (re-evaluate on 11/24)   - Zosyn for klebsiella and Pseudomonas for two weeks for coverage  - add on blasto/histo/aspergillus    Heme:     Acute blood loss anemia secondary to surgery and GI bleed, and anemia related to critical illness and iron deficiency    Hypogammaglobulinemia s/p IVIG  Thrombocytopenia, HIT negative - resolved   Lactic acidosis, resolved  Nonocclusive R subclavian thrombus    - HIT panel negative  - comadin 16 NOV    Prophylaxis:    - Mechanical prophylaxis for DVT: SCDs  - Chemical DVT prophylaxis: warfarin   - PPI     Lines/ tubes/ drains:  - trach  - peg-j  - Watson 10/25; replaced 11/14  - PIVs  - chest tube L  - L PICC     Disposition:  - [facility] ICU    Patient seen, findings and plan discussed with CV ICU staff    Hira Acosta DO CA-2  Department of Anesthesiology  [phone]    ====================================    SUBJECTIVE:   - NAEO laying comfortably in bed    OBJECTIVE:   1.  VITAL SIGNS:   Temp:  [97.6  F (36.4  C)-99.2  F (37.3  C)] 98.1  F (36.7  C)  Pulse:  [] 63  Resp:  [16-27] 16  BP: ()/(65-98) 152/81  FiO2 (%):  [40 %] 40 %  SpO2:  [94 %-100 %] 98 %  Ventilation Mode: Trach collar  FiO2 (%): 40 %  Oxygen Concentration (%): 40 %  Resp: 16      2. INTAKE/ OUTPUT:   I/O last 3 completed shifts:  In: 2580 [I.V.:520; NG/GT:920]  Out: 1760 [Urine:425; Emesis/NG output:75; Stool:1250; Chest Tube:10]    3. PHYSICAL EXAMINATION:   General: Alert individual sitting in bed,  Neuro: Alert and Oriented, resting comfortably   Resp: Trach present, breathing comfortably  CV: Sinus  Abdomen: Soft, Non-distended, Non-tender  Incisions: c/d/i  Extremities: warm and well perfused    4. INVESTIGATIONS:   Arterial Blood Gases   No lab results found in last 7 days.  Complete Blood Count   Recent Labs   Lab 11/22/21  0359 11/21/21  0333 11/20/21  0318 11/19/21  0341   WBC 13.5* 12.2* 11.2* 11.1*   HGB 9.5* 9.2* 7.6* 8.0*   PLT 81* 86* 78* 100*     Basic Metabolic Panel  Recent Labs   Lab 11/22/21  0403 11/22/21  0359 11/21/21  2340 11/21/21  2032 11/21/21  0336 11/21/21  0333 11/20/21  1639 11/20/21  1635 11/20/21  0807 11/20/21  0318   NA  --  144  144  --   --   --  141  141  --  150*  --  144  144   POTASSIUM  --  4.4  4.4  --   --   --  3.8  3.8  --  3.6  --  3.5  3.5   CHLORIDE  --  112*  112*  --   --   --  106  106  --  112*  --  109  109   CO2  --  26  26  --   --   --  29  29  --  26  --  28  28   BUN  --  53*  53*  --   --   --  55*  55*  --  43*  --  36*  36*   CR  --  0.82  0.82  --   --   --  0.86  0.86  --  0.76  --  0.72  0.72   * 191*  191* 166* 109*   < > 204*  204*   < > 105*   < > 180*  180*    < > = values in this interval not displayed.     Liver Function Tests  Recent Labs   Lab 11/22/21  0359 11/21/21  0333 11/20/21  0318 11/19/21  0341   AST 22 20 13 18   ALT 30 29 23 29   ALKPHOS 127 133 111 123   BILITOTAL 0.2 0.2 0.2 0.2   ALBUMIN 2.3*  2.4* 1.8* 1.9*   INR 3.47* 2.95* 2.12* 1.46*     Pancreatic Enzymes  No lab results found in last 7 days.  Coagulation Profile  Recent Labs   Lab 11/22/21  0359 11/21/21  0333 11/20/21  0318 11/19/21  0341   INR 3.47* 2.95* 2.12* 1.46*         5. RADIOLOGY:   Recent Results (from the past 24 hour(s))   CT Chest w/o Contrast    Impression    RESIDENT PRELIMINARY INTERPRETATION  IMPRESSION:   1. New cavitary lesion in the left lung base with multifocal  groundglass opacities some of which are new compared to CT 11/2/2021,  notably in the bilateral upper lobes concerning for ongoing infection.  2. Decreased bilateral loculated pleural effusions.  3. New 1.8 cm fluid collection with surrounding fat stranding in the  left axilla.  4. Similar small pericardial effusion.       =========================================

## 2021-11-22 NOTE — PLAN OF CARE
Major Shift Events:     Pt follows commands, moves all extremities and appears oriented. Denies pain. Very tired this afternoon after therapy. Slept more of later afternoon. Tmax 99.3. HR NSR. SBP >140 at times, labetalol given x 1 per order. On trach dome 40%, 40L. Small amount of secretions. TF at goal, rate changed with new formula. Frequently incontinent stool output. Rectal pouch placed. Stool watery. Pt is intermittently incontinent of urine. Lasix given BID. Sacrum/coccyx has blanchable redness due to incontinence. Significant other at bedside.     Plan: continue aggressive PT/OT- upright in chair.       For vital signs and complete assessments, please see documentation flowsheets.     Problem: ARDS (Acute Respiratory Distress Syndrome)  Goal: Effective Oxygenation  Outcome: Improving     Problem: Risk for Delirium  Goal: Improved Attention and Thought Clarity  Outcome: Improving

## 2021-11-22 NOTE — PLAN OF CARE
Major Shift Events: TD on 40% all day. VSS. One dose of PRN labetolol given in the evening for SBP > 140 and MAP > 100. Watson removed at 1030. Voided 110mls in the afternoon once. Rectal tube still in place with 900 mls out this shift. Up to chair for most of shift.   Plan: Continue with POC & update primary team with changes.   For vital signs and complete assessments, please see documentation flowsheets.

## 2021-11-22 NOTE — PHARMACY-ANTICOAGULATION SERVICE
Warfarin Therapy Hold Note  This patient is currently receiving warfarin for IJ thrombus, atrial fibrillation.    Goal INR:  2-3.      Anticoagulation Dose History     Recent Dosing and Labs Latest Ref Rng & Units 11/16/2021 11/17/2021 11/18/2021 11/19/2021 11/20/2021 11/21/2021 11/22/2021    Warfarin 1.5 mg - - - - - - 1.5 mg -    Warfarin 2 mg - 2 mg 2 mg 2 mg - - - -    Warfarin 3 mg - - - - - 3 mg - -    Warfarin 4 mg - - - - 4 mg - - -    INR 0.85 - 1.15 1.18(H) 1.23(H) 1.40(H) 1.46(H) 2.12(H) 2.95(H) 3.47(H)            Bleeding Signs/Symptoms:  None    Assessment:  Current INR level is supratherapeutic.    Plan:  1) HOLD today s warfarin dose.   An order has been placed in EPIC for  Warfarin- No Dose Today    2) Do not recommend reversal with vitamin K or FFP at this time  3) Recheck next INR: tomorrow with AM labs    The primary team has been contacted about the above plan/primary team is aware of need to hold dose/team notification not necessary.

## 2021-11-23 ENCOUNTER — APPOINTMENT (OUTPATIENT)
Dept: OCCUPATIONAL THERAPY | Facility: CLINIC | Age: 56
End: 2021-11-23
Attending: STUDENT IN AN ORGANIZED HEALTH CARE EDUCATION/TRAINING PROGRAM
Payer: COMMERCIAL

## 2021-11-23 ENCOUNTER — APPOINTMENT (OUTPATIENT)
Dept: GENERAL RADIOLOGY | Facility: CLINIC | Age: 56
End: 2021-11-23
Attending: STUDENT IN AN ORGANIZED HEALTH CARE EDUCATION/TRAINING PROGRAM
Payer: COMMERCIAL

## 2021-11-23 ENCOUNTER — APPOINTMENT (OUTPATIENT)
Dept: PHYSICAL THERAPY | Facility: CLINIC | Age: 56
End: 2021-11-23
Attending: STUDENT IN AN ORGANIZED HEALTH CARE EDUCATION/TRAINING PROGRAM
Payer: COMMERCIAL

## 2021-11-23 ENCOUNTER — APPOINTMENT (OUTPATIENT)
Dept: SPEECH THERAPY | Facility: CLINIC | Age: 56
End: 2021-11-23
Attending: STUDENT IN AN ORGANIZED HEALTH CARE EDUCATION/TRAINING PROGRAM
Payer: COMMERCIAL

## 2021-11-23 LAB
1,3 BETA GLUCAN SER-MCNC: >500 PG/ML
ABO/RH(D): NORMAL
ALBUMIN SERPL-MCNC: 2.4 G/DL (ref 3.4–5)
ALP SERPL-CCNC: 132 U/L (ref 40–150)
ALT SERPL W P-5'-P-CCNC: 31 U/L (ref 0–70)
ANION GAP SERPL CALCULATED.3IONS-SCNC: 4 MMOL/L (ref 3–14)
ANION GAP SERPL CALCULATED.3IONS-SCNC: 4 MMOL/L (ref 3–14)
ANION GAP SERPL CALCULATED.3IONS-SCNC: 5 MMOL/L (ref 3–14)
ANTIBODY SCREEN: NEGATIVE
AST SERPL W P-5'-P-CCNC: 25 U/L (ref 0–45)
ATRIAL RATE - MUSE: 66 BPM
BACTERIA BLD CULT: NO GROWTH
BACTERIA SPEC CULT: ABNORMAL
BILIRUB SERPL-MCNC: 0.4 MG/DL (ref 0.2–1.3)
BUN SERPL-MCNC: 56 MG/DL (ref 7–30)
BUN SERPL-MCNC: 56 MG/DL (ref 7–30)
BUN SERPL-MCNC: 64 MG/DL (ref 7–30)
CALCIUM SERPL-MCNC: 9.1 MG/DL (ref 8.5–10.1)
CALCIUM SERPL-MCNC: 9.1 MG/DL (ref 8.5–10.1)
CALCIUM SERPL-MCNC: 9.4 MG/DL (ref 8.5–10.1)
CHLORIDE BLD-SCNC: 112 MMOL/L (ref 94–109)
CMV DNA SPEC NAA+PROBE-ACNC: NOT DETECTED IU/ML
CO2 SERPL-SCNC: 30 MMOL/L (ref 20–32)
CREAT SERPL-MCNC: 0.84 MG/DL (ref 0.66–1.25)
CREAT SERPL-MCNC: 0.84 MG/DL (ref 0.66–1.25)
CREAT SERPL-MCNC: 0.9 MG/DL (ref 0.66–1.25)
DIASTOLIC BLOOD PRESSURE - MUSE: NORMAL MMHG
ERYTHROCYTE [DISTWIDTH] IN BLOOD BY AUTOMATED COUNT: 16.7 % (ref 10–15)
FLUAV H1 2009 PAND RNA SPEC QL NAA+PROBE: NEGATIVE
FLUAV H1 RNA SPEC QL NAA+PROBE: NEGATIVE
FLUAV H3 RNA SPEC QL NAA+PROBE: NEGATIVE
FLUAV RNA SPEC QL NAA+PROBE: NEGATIVE
FLUBV RNA SPEC QL NAA+PROBE: NEGATIVE
GFR SERPL CREATININE-BSD FRML MDRD: >90 ML/MIN/1.73M2
GLUCOSE BLD-MCNC: 122 MG/DL (ref 70–99)
GLUCOSE BLD-MCNC: 195 MG/DL (ref 70–99)
GLUCOSE BLD-MCNC: 195 MG/DL (ref 70–99)
GLUCOSE BLDC GLUCOMTR-MCNC: 105 MG/DL (ref 70–99)
GLUCOSE BLDC GLUCOMTR-MCNC: 124 MG/DL (ref 70–99)
GLUCOSE BLDC GLUCOMTR-MCNC: 141 MG/DL (ref 70–99)
GLUCOSE BLDC GLUCOMTR-MCNC: 148 MG/DL (ref 70–99)
GLUCOSE BLDC GLUCOMTR-MCNC: 174 MG/DL (ref 70–99)
GLUCOSE BLDC GLUCOMTR-MCNC: 182 MG/DL (ref 70–99)
HADV DNA SPEC QL NAA+PROBE: NEGATIVE
HADV DNA SPEC QL NAA+PROBE: NEGATIVE
HCT VFR BLD AUTO: 30.5 % (ref 40–53)
HGB BLD-MCNC: 9.6 G/DL (ref 13.3–17.7)
HMPV RNA SPEC QL NAA+PROBE: NEGATIVE
HPIV1 RNA SPEC QL NAA+PROBE: NEGATIVE
HPIV2 RNA SPEC QL NAA+PROBE: NEGATIVE
HPIV3 RNA SPEC QL NAA+PROBE: NEGATIVE
INR PPP: 3.41 (ref 0.85–1.15)
INTERPRETATION ECG - MUSE: NORMAL
MAGNESIUM SERPL-MCNC: 2.1 MG/DL (ref 1.6–2.3)
MCH RBC QN AUTO: 30.5 PG (ref 26.5–33)
MCHC RBC AUTO-ENTMCNC: 31.5 G/DL (ref 31.5–36.5)
MCV RBC AUTO: 97 FL (ref 78–100)
OBSERVATION IMP: POSITIVE
P AXIS - MUSE: 45 DEGREES
PHOSPHATE SERPL-MCNC: 4.1 MG/DL (ref 2.5–4.5)
PLATELET # BLD AUTO: 79 10E3/UL (ref 150–450)
POTASSIUM BLD-SCNC: 4.2 MMOL/L (ref 3.4–5.3)
POTASSIUM BLD-SCNC: 4.5 MMOL/L (ref 3.4–5.3)
POTASSIUM BLD-SCNC: 4.5 MMOL/L (ref 3.4–5.3)
PR INTERVAL - MUSE: 132 MS
PROT SERPL-MCNC: 6.2 G/DL (ref 6.8–8.8)
QRS DURATION - MUSE: 78 MS
QT - MUSE: 376 MS
QTC - MUSE: 394 MS
R AXIS - MUSE: 48 DEGREES
RBC # BLD AUTO: 3.15 10E6/UL (ref 4.4–5.9)
RHINOVIRUS RNA SPEC QL NAA+PROBE: NEGATIVE
RSV RNA SPEC QL NAA+PROBE: NEGATIVE
RSV RNA SPEC QL NAA+PROBE: NEGATIVE
SODIUM SERPL-SCNC: 146 MMOL/L (ref 133–144)
SODIUM SERPL-SCNC: 146 MMOL/L (ref 133–144)
SODIUM SERPL-SCNC: 147 MMOL/L (ref 133–144)
SPECIMEN EXPIRATION DATE: NORMAL
SYSTOLIC BLOOD PRESSURE - MUSE: NORMAL MMHG
T AXIS - MUSE: 249 DEGREES
VENTRICULAR RATE- MUSE: 66 BPM
WBC # BLD AUTO: 11.3 10E3/UL (ref 4–11)

## 2021-11-23 PROCEDURE — 120N000003 HC R&B IMCU UMMC

## 2021-11-23 PROCEDURE — 83735 ASSAY OF MAGNESIUM: CPT | Performed by: THORACIC SURGERY (CARDIOTHORACIC VASCULAR SURGERY)

## 2021-11-23 PROCEDURE — 250N000013 HC RX MED GY IP 250 OP 250 PS 637: Performed by: STUDENT IN AN ORGANIZED HEALTH CARE EDUCATION/TRAINING PROGRAM

## 2021-11-23 PROCEDURE — 250N000011 HC RX IP 250 OP 636

## 2021-11-23 PROCEDURE — 97110 THERAPEUTIC EXERCISES: CPT | Mod: GO

## 2021-11-23 PROCEDURE — 94640 AIRWAY INHALATION TREATMENT: CPT

## 2021-11-23 PROCEDURE — 250N000012 HC RX MED GY IP 250 OP 636 PS 637: Performed by: PHYSICIAN ASSISTANT

## 2021-11-23 PROCEDURE — 84100 ASSAY OF PHOSPHORUS: CPT | Performed by: THORACIC SURGERY (CARDIOTHORACIC VASCULAR SURGERY)

## 2021-11-23 PROCEDURE — 250N000013 HC RX MED GY IP 250 OP 250 PS 637: Performed by: ANESTHESIOLOGY

## 2021-11-23 PROCEDURE — 99233 SBSQ HOSP IP/OBS HIGH 50: CPT | Mod: 24 | Performed by: INTERNAL MEDICINE

## 2021-11-23 PROCEDURE — 93010 ELECTROCARDIOGRAM REPORT: CPT | Performed by: INTERNAL MEDICINE

## 2021-11-23 PROCEDURE — 250N000011 HC RX IP 250 OP 636: Performed by: ANESTHESIOLOGY

## 2021-11-23 PROCEDURE — 80069 RENAL FUNCTION PANEL: CPT

## 2021-11-23 PROCEDURE — 92522 EVALUATE SPEECH PRODUCTION: CPT | Mod: GN

## 2021-11-23 PROCEDURE — 92507 TX SP LANG VOICE COMM INDIV: CPT | Mod: GN

## 2021-11-23 PROCEDURE — 85610 PROTHROMBIN TIME: CPT | Performed by: THORACIC SURGERY (CARDIOTHORACIC VASCULAR SURGERY)

## 2021-11-23 PROCEDURE — 250N000013 HC RX MED GY IP 250 OP 250 PS 637: Performed by: THORACIC SURGERY (CARDIOTHORACIC VASCULAR SURGERY)

## 2021-11-23 PROCEDURE — 250N000011 HC RX IP 250 OP 636: Performed by: STUDENT IN AN ORGANIZED HEALTH CARE EDUCATION/TRAINING PROGRAM

## 2021-11-23 PROCEDURE — 87385 HISTOPLASMA CAPSUL AG IA: CPT | Performed by: INTERNAL MEDICINE

## 2021-11-23 PROCEDURE — 97530 THERAPEUTIC ACTIVITIES: CPT | Mod: GP

## 2021-11-23 PROCEDURE — 250N000012 HC RX MED GY IP 250 OP 636 PS 637: Performed by: INTERNAL MEDICINE

## 2021-11-23 PROCEDURE — 86900 BLOOD TYPING SEROLOGIC ABO: CPT | Performed by: THORACIC SURGERY (CARDIOTHORACIC VASCULAR SURGERY)

## 2021-11-23 PROCEDURE — 250N000012 HC RX MED GY IP 250 OP 636 PS 637: Performed by: STUDENT IN AN ORGANIZED HEALTH CARE EDUCATION/TRAINING PROGRAM

## 2021-11-23 PROCEDURE — 250N000013 HC RX MED GY IP 250 OP 250 PS 637: Performed by: NURSE PRACTITIONER

## 2021-11-23 PROCEDURE — 71045 X-RAY EXAM CHEST 1 VIEW: CPT

## 2021-11-23 PROCEDURE — 80048 BASIC METABOLIC PNL TOTAL CA: CPT

## 2021-11-23 PROCEDURE — 36592 COLLECT BLOOD FROM PICC: CPT

## 2021-11-23 PROCEDURE — 85027 COMPLETE CBC AUTOMATED: CPT | Performed by: THORACIC SURGERY (CARDIOTHORACIC VASCULAR SURGERY)

## 2021-11-23 PROCEDURE — 71045 X-RAY EXAM CHEST 1 VIEW: CPT | Mod: 26 | Performed by: RADIOLOGY

## 2021-11-23 PROCEDURE — 250N000013 HC RX MED GY IP 250 OP 250 PS 637

## 2021-11-23 PROCEDURE — 250N000009 HC RX 250: Performed by: STUDENT IN AN ORGANIZED HEALTH CARE EDUCATION/TRAINING PROGRAM

## 2021-11-23 PROCEDURE — 94668 MNPJ CHEST WALL SBSQ: CPT

## 2021-11-23 PROCEDURE — 999N000157 HC STATISTIC RCP TIME EA 10 MIN

## 2021-11-23 PROCEDURE — 250N000012 HC RX MED GY IP 250 OP 636 PS 637: Performed by: NURSE PRACTITIONER

## 2021-11-23 PROCEDURE — 99232 SBSQ HOSP IP/OBS MODERATE 35: CPT | Mod: 24 | Performed by: ANESTHESIOLOGY

## 2021-11-23 PROCEDURE — 250N000011 HC RX IP 250 OP 636: Performed by: PHYSICIAN ASSISTANT

## 2021-11-23 PROCEDURE — 93005 ELECTROCARDIOGRAM TRACING: CPT

## 2021-11-23 PROCEDURE — 94640 AIRWAY INHALATION TREATMENT: CPT | Mod: 76

## 2021-11-23 PROCEDURE — 99223 1ST HOSP IP/OBS HIGH 75: CPT | Performed by: INTERNAL MEDICINE

## 2021-11-23 PROCEDURE — 94003 VENT MGMT INPAT SUBQ DAY: CPT

## 2021-11-23 RX ORDER — AMLODIPINE BESYLATE 2.5 MG/1
5 TABLET ORAL DAILY
Status: DISCONTINUED | OUTPATIENT
Start: 2021-11-23 | End: 2021-12-05

## 2021-11-23 RX ORDER — CEFTAZIDIME 2 G/1
2 INJECTION, POWDER, FOR SOLUTION INTRAVENOUS EVERY 8 HOURS
Status: DISCONTINUED | OUTPATIENT
Start: 2021-11-23 | End: 2021-12-13 | Stop reason: HOSPADM

## 2021-11-23 RX ORDER — ACETYLCYSTEINE 200 MG/ML
2 SOLUTION ORAL; RESPIRATORY (INHALATION) 2 TIMES DAILY
Status: DISCONTINUED | OUTPATIENT
Start: 2021-11-23 | End: 2021-12-08

## 2021-11-23 RX ORDER — LEVOFLOXACIN 5 MG/ML
750 INJECTION, SOLUTION INTRAVENOUS EVERY 24 HOURS
Status: COMPLETED | OUTPATIENT
Start: 2021-11-23 | End: 2021-12-07

## 2021-11-23 RX ORDER — LEVALBUTEROL INHALATION SOLUTION 1.25 MG/3ML
1.25 SOLUTION RESPIRATORY (INHALATION) 2 TIMES DAILY
Status: DISCONTINUED | OUTPATIENT
Start: 2021-11-23 | End: 2021-12-08

## 2021-11-23 RX ADMIN — LEVOTHYROXINE SODIUM 25 MCG: 0.03 TABLET ORAL at 08:59

## 2021-11-23 RX ADMIN — Medication 5 ML: at 17:04

## 2021-11-23 RX ADMIN — PREDNISONE 12.5 MG: 2.5 TABLET ORAL at 08:59

## 2021-11-23 RX ADMIN — NYSTATIN 1000000 UNITS: 500000 SUSPENSION ORAL at 17:13

## 2021-11-23 RX ADMIN — PIPERACILLIN SODIUM AND TAZOBACTAM SODIUM 4.5 G: 4; .5 INJECTION, POWDER, LYOPHILIZED, FOR SOLUTION INTRAVENOUS at 03:52

## 2021-11-23 RX ADMIN — Medication 40 MG: at 20:11

## 2021-11-23 RX ADMIN — SODIUM CHLORIDE, PRESERVATIVE FREE 5 ML: 5 INJECTION INTRAVENOUS at 17:13

## 2021-11-23 RX ADMIN — PIPERACILLIN SODIUM AND TAZOBACTAM SODIUM 4.5 G: 4; .5 INJECTION, POWDER, LYOPHILIZED, FOR SOLUTION INTRAVENOUS at 11:01

## 2021-11-23 RX ADMIN — INSULIN GLARGINE 30 UNITS: 100 INJECTION, SOLUTION SUBCUTANEOUS at 20:03

## 2021-11-23 RX ADMIN — CALCIUM CARBONATE 600 MG (1,500 MG)-VITAMIN D3 400 UNIT TABLET 1 TABLET: at 08:59

## 2021-11-23 RX ADMIN — NYSTATIN 1000000 UNITS: 500000 SUSPENSION ORAL at 08:58

## 2021-11-23 RX ADMIN — INSULIN GLARGINE 30 UNITS: 100 INJECTION, SOLUTION SUBCUTANEOUS at 09:03

## 2021-11-23 RX ADMIN — CALCIUM CARBONATE 600 MG (1,500 MG)-VITAMIN D3 400 UNIT TABLET 1 TABLET: at 17:13

## 2021-11-23 RX ADMIN — Medication 40 MG: at 09:03

## 2021-11-23 RX ADMIN — Medication 0.5 MG: at 19:57

## 2021-11-23 RX ADMIN — Medication 1 TABLET: at 08:59

## 2021-11-23 RX ADMIN — LABETALOL HYDROCHLORIDE 20 MG: 5 INJECTION, SOLUTION INTRAVENOUS at 07:01

## 2021-11-23 RX ADMIN — FLUCONAZOLE IN SODIUM CHLORIDE 400 MG: 2 INJECTION, SOLUTION INTRAVENOUS at 12:54

## 2021-11-23 RX ADMIN — AMLODIPINE BESYLATE 5 MG: 2.5 TABLET ORAL at 08:59

## 2021-11-23 RX ADMIN — MULTIVIT AND MINERALS-FERROUS GLUCONATE 9 MG IRON/15 ML ORAL LIQUID 15 ML: at 08:58

## 2021-11-23 RX ADMIN — NYSTATIN 1000000 UNITS: 500000 SUSPENSION ORAL at 12:54

## 2021-11-23 RX ADMIN — INSULIN ASPART 2 UNITS: 100 INJECTION, SOLUTION INTRAVENOUS; SUBCUTANEOUS at 00:18

## 2021-11-23 RX ADMIN — PREDNISONE 10 MG: 5 TABLET ORAL at 19:57

## 2021-11-23 RX ADMIN — INSULIN ASPART 2 UNITS: 100 INJECTION, SOLUTION INTRAVENOUS; SUBCUTANEOUS at 03:40

## 2021-11-23 RX ADMIN — ROSUVASTATIN CALCIUM 10 MG: 10 TABLET, FILM COATED ORAL at 09:00

## 2021-11-23 RX ADMIN — FUROSEMIDE 40 MG: 10 INJECTION INTRAMUSCULAR; INTRAVENOUS at 17:30

## 2021-11-23 RX ADMIN — ACETYLCYSTEINE 2 ML: 200 SOLUTION ORAL; RESPIRATORY (INHALATION) at 12:39

## 2021-11-23 RX ADMIN — FUROSEMIDE 40 MG: 10 INJECTION INTRAMUSCULAR; INTRAVENOUS at 08:58

## 2021-11-23 RX ADMIN — INSULIN ASPART 1 UNITS: 100 INJECTION, SOLUTION INTRAVENOUS; SUBCUTANEOUS at 12:55

## 2021-11-23 RX ADMIN — LEVALBUTEROL HYDROCHLORIDE 1.25 MG: 1.25 SOLUTION RESPIRATORY (INHALATION) at 07:46

## 2021-11-23 RX ADMIN — TACROLIMUS 2.5 MG: 5 CAPSULE ORAL at 18:15

## 2021-11-23 RX ADMIN — LEVOFLOXACIN 750 MG: 5 INJECTION, SOLUTION INTRAVENOUS at 17:27

## 2021-11-23 RX ADMIN — MYCOPHENOLATE MOFETIL 1000 MG: 200 POWDER, FOR SUSPENSION ORAL at 21:56

## 2021-11-23 RX ADMIN — NYSTATIN 1000000 UNITS: 500000 SUSPENSION ORAL at 19:56

## 2021-11-23 RX ADMIN — QUETIAPINE FUMARATE 25 MG: 25 TABLET ORAL at 13:45

## 2021-11-23 RX ADMIN — Medication 10 MG: at 19:57

## 2021-11-23 RX ADMIN — LEVALBUTEROL HYDROCHLORIDE 1.25 MG: 1.25 SOLUTION RESPIRATORY (INHALATION) at 12:39

## 2021-11-23 RX ADMIN — QUETIAPINE FUMARATE 25 MG: 25 TABLET ORAL at 09:00

## 2021-11-23 RX ADMIN — ACETYLCYSTEINE 2 ML: 200 SOLUTION ORAL; RESPIRATORY (INHALATION) at 07:46

## 2021-11-23 RX ADMIN — SULFAMETHOXAZOLE AND TRIMETHOPRIM 1 TABLET: 400; 80 TABLET ORAL at 08:59

## 2021-11-23 RX ADMIN — QUETIAPINE FUMARATE 50 MG: 50 TABLET ORAL at 19:57

## 2021-11-23 RX ADMIN — TACROLIMUS 2 MG: 5 CAPSULE ORAL at 09:04

## 2021-11-23 RX ADMIN — CEFTAZIDIME 2 G: 2 INJECTION, POWDER, FOR SOLUTION INTRAVENOUS at 19:37

## 2021-11-23 RX ADMIN — MYCOPHENOLATE MOFETIL 1000 MG: 200 POWDER, FOR SUSPENSION ORAL at 09:03

## 2021-11-23 RX ADMIN — INSULIN ASPART 1 UNITS: 100 INJECTION, SOLUTION INTRAVENOUS; SUBCUTANEOUS at 20:03

## 2021-11-23 RX ADMIN — ESCITALOPRAM 5 MG: 5 TABLET, FILM COATED ORAL at 08:59

## 2021-11-23 ASSESSMENT — ACTIVITIES OF DAILY LIVING (ADL)
ADLS_ACUITY_SCORE: 27
ADLS_ACUITY_SCORE: 29
ADLS_ACUITY_SCORE: 27

## 2021-11-23 ASSESSMENT — MIFFLIN-ST. JEOR: SCORE: 1341

## 2021-11-23 NOTE — PROGRESS NOTES
Transfer  Transferred from:   Via:bed  Reason for transfer: Pt appropriate for 6D- improved patient condition  Family: Aware of transfer  Belongings: Received with pt  Chart: Received with pt  Medications: Meds received from old unit with pt  Code Status verified on armband: yes  2 RN Skin Assessment Completed By: Mariah REYES RN and Leisa SCHULTE RN    Suction/Ambu bag/Flowmeter at bedside: yes    Report received from: Hunter  Pt status: Stable

## 2021-11-23 NOTE — PROGRESS NOTES
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  UGIB causing code blue on 11/2.   Pt now being assessed for new cavitary lesion of the left lung of unknown etiology, assumed to be fungal.     OVERNIGHT EVENTS:  - one dose of labetalol needed for SBPs in 160s.      TODAY'S PROGRESS:  Started home amlodipine at 5mg PO  - Speech to assess for Passey Radha valve    PLAN:  Neuro/ pain/ sedation:  Acute postoperative pain   Anxiety and depression  Acute to subacute CVA, b/l cerebral hemispheres and L cerebellum, suspect embolic  Encephalopathy and diffuse weakness - improving slightly   R ear lateral canal floor excoriation with bleeding - resolved   - Tylenol and oxycodone prn, lido patch, PRN dilaudid  - PTA lexapro  - Seroquel 25 BID with 50mg HS dose               -PRN Seroquel    Pulmonary care:   SALTY on home CPAP  Acute hypoxic respiratory failure s/p b/l lung transplant 10/16/21  S/p tracheostomy 10/29  Empyema with Candida s/p chest tube 11/3/21  - trach dome tolerated for >48 hours  - Levalbuterol nebs scheduled and Mucomyst, chest PT  - Daily CXR  - appreciate Pulmonary recs    Cardiovascular:    HTN  Afib   PFO  Vasoplegic shock in setting of hypovolemia - resolved  - Monitor hemodynamic status.   - MAP goal <100, SBP <140  - prn labetalol   - rosuvastatin 10 mg    GI care:   Elevated LFTs - recurrent  GI bleed 2/2 NG trauma, now recurrent secondary to GJ bumper ulcer    Moderate malnutrition in the context of acute on chronic illness  PEG-J, with malpositioned J tube   - Diet: TF  - PPI BID    Fluids/ Electrolytes/ Nutrition:   Hypernatremia  Hyperkalemia, resolved   BL creat appears to be ~ 0.7  - free water flushes  - Goal 0 to -50  - Strict I/O, daily weights  - Avoid/limit nephrotoxins as able  - Replete lytes PRN per protocol  - Diurese - lasix 40mg BID    Endocrine:    Stress induced hyperglycemia with steroid-induced component, on DM2  Hypothyroidism  RA  Preop A1c 6.6  - Goal BG <180 for optimal healing  - continue home synthroid  - lantus 30 BID, sliding scale insulin                 - Endocrine signed off    ID/ Antibiotics:  Immunosuppression s/p transplant  Candidal infection of donor lungs (likely source), pleural fluid, and fungemia   PNA  New cavitary lesion in LLL  - NGTD CSF. Last + blood cx 10/22. No vegetations on valves per TC 10/23  - Nystatin, Acyclovir, bactrim   - Tacrolimus (via g tube), prednisone    - appreciate transplant ID, ophthalmology    - d/w ID, no indication to treat Staph epi in sputum   - Fluconazole for fungemia (re-evaluate on 11/24)   - Zosyn for klebsiella and Pseudomonas for two weeks for coverage    Heme:     Acute blood loss anemia secondary to surgery and GI bleed, and anemia related to critical illness and iron deficiency    Hypogammaglobulinemia s/p IVIG  Thrombocytopenia, HIT negative - resolved   Lactic acidosis, resolved  Nonocclusive R subclavian thrombus    - HIT panel negative  - comadin 16 NOV -pharmacy dosing    Prophylaxis:    - Mechanical prophylaxis for DVT: SCDs  - Chemical DVT prophylaxis  : warfarin  - PPI     Lines/ tubes/ drains:  - trach  - peg-j  - Watson 10/25; replaced 11/14  - PIVs  - chest tube L  - L PICC     Disposition:  - [facility] ICU    Patient seen, findings and plan discussed with CV ICU staff    Hira Acosta DO CA-2  Department of  Anesthesiology  [phone]    ====================================    SUBJECTIVE:   - NAEO laying comfortably in bed    OBJECTIVE:   1. VITAL SIGNS:   Temp:  [98.1  F (36.7  C)-99.3  F (37.4  C)] 98.8  F (37.1  C)  Pulse:  [64-90] 90  Resp:  [10-23] 11  BP: (109-163)/() 163/93  FiO2 (%):  [10 %-40 %] 40 %  SpO2:  [95 %-99 %] 98 %  Ventilation Mode: Trach collar  FiO2 (%): 40 %  Resp: 11      2. INTAKE/ OUTPUT:   I/O last 3 completed shifts:  In: 2895 [I.V.:640; NG/GT:750]  Out: 2430 [Urine:2125; Emesis/NG output:205; Stool:100]    3. PHYSICAL EXAMINATION:   General: Alert individual sitting in bed,  Neuro: Alert and Oriented, resting comfortably   Resp: Trach present, breathing comfortably  CV: Sinus  Abdomen: Soft, Non-distended, Non-tender  Incisions: c/d/i  Extremities: warm and well perfused    4. INVESTIGATIONS:   Arterial Blood Gases   No lab results found in last 7 days.  Complete Blood Count   Recent Labs   Lab 11/23/21  0332 11/22/21  0359 11/21/21  0333 11/20/21  0318   WBC 11.3* 13.5* 12.2* 11.2*   HGB 9.6* 9.5* 9.2* 7.6*   PLT 79* 81* 86* 78*     Basic Metabolic Panel  Recent Labs   Lab 11/23/21  0332 11/23/21  0017 11/22/21  2004 11/22/21  1636 11/22/21  1635 11/22/21  0403 11/22/21  0359 11/21/21  0336 11/21/21  0333   *  146*  --   --   --  144  --  144  144  --  141  141   POTASSIUM 4.5  4.5  --   --   --  5.0  --  4.4  4.4  --  3.8  3.8   CHLORIDE 112*  112*  --   --   --  114*  --  112*  112*  --  106  106   CO2 30  30  --   --   --  26  --  26  26  --  29  29   BUN 56*  56*  --   --   --  47*  --  53*  53*  --  55*  55*   CR 0.84  0.84  --   --   --  0.74  --  0.82  0.82  --  0.86  0.86   *  195* 174* 159* 140* 162*   < > 191*  191*   < > 204*  204*    < > = values in this interval not displayed.     Liver Function Tests  Recent Labs   Lab 11/23/21  0332 11/22/21  0359 11/21/21  0333 11/20/21  0318   AST 25 22 20 13   ALT 31 30 29 23   ALKPHOS 132 127 133  111   BILITOTAL 0.4 0.2 0.2 0.2   ALBUMIN 2.4* 2.3* 2.4* 1.8*   INR 3.41* 3.47* 2.95* 2.12*     Pancreatic Enzymes  No lab results found in last 7 days.  Coagulation Profile  Recent Labs   Lab 11/23/21  0332 11/22/21  0359 11/21/21  0333 11/20/21  0318   INR 3.41* 3.47* 2.95* 2.12*         5. RADIOLOGY:   Recent Results (from the past 24 hour(s))   XR Chest Port 1 View    Impression    RESIDENT PRELIMINARY INTERPRETATION  IMPRESSION:   1. Stable trace bilateral pleural effusions.  2. Stable bibasilar opacities, favoring postoperative atelectasis  and/or edema.       =========================================

## 2021-11-23 NOTE — CONSULTS
[facility]    Transplant Infectious Diseases Inpatient Consultation      Edson Thornton MRN# [id number]   YOB: 1965 Age: 56 year old   Date of Admission and time: 9/5/2021  4:42 PM     Reason for consult: I was asked by Dr. Corral to evaluate this patient for pulmonary nodule.             Recommendations:   1. Continue flucaonzole.   2. Check urinary Histoplasma Ag (ordered for you).   3. It is best to evacuate any remaining right and/or left loculated effusions since they may still represent empyema with C albicans.   4. Please discontinue zosyn.   5. Please start ceftazdime 2 grams q8 hr.   6. Please start levaquin 750 mg daily.   7. Will need follow up CT in 4 weeks.   8. Will follow up on BAL results from 11/22/21.   9. Check EKG in 4 days to check QTc after adding levaquin to fluconazole.         Summary of Presentation:   Transplants:  10/16/2021 (Lung), Postoperative day:  38     This patient is a 56 year old male with ILD due to RA was on MMF, rituximab and high dose steroids prior to lung transplant. S/p Bi lung transplant. Basiliximab for induction, TAC/MMF/prednisone for maintenance.         Active Problems and Infectious Diseases Issues:   1. LLL cavity.   Aspiration- vs pseudmonal- vs K pneumonia-induced abscess. With treat with double coverage ABx for the first few weeks given that sever pseudomonal infection may develop resistance.   Ceftazidime targets both K pneumonia and P aeruginosa.     This is less likely to be fungal infection including endemic fungi. Recent Cocci Ag in the urine was negative (residing in endemic areas). Also Histoplasma rarely reactivates (donor and recipient from an endemic areas) and the patient is already on fluconazole which is active against both Histoplasma and Cocci. Will check urinary Histo to be on the safe side.      2. Invasive candidiasis with C albicans.   Left empyema and candidemia.   The candidemia was positive  11/20/2021 and 11/22/2021, first negative blood cx on 11/23/2021.   The left empyema treated with chest tube.     According to last CT chest, still with bilateral loculated effusions R>L. It is best to drain any loculated effusion to better control invasive candidiasis.     Continue fluconazole.     3. At risk for drug adverse event.   Given fluconazole/levaquin combination; check EKG in 4 days to rule out QTc prolongation.     4. Granuloma on CT chest prior to transplant   This was not seen in the pathology of the explanted lungs also no evidence of mycobacterial or fungal infection of the resected LN.   There would be no further indications to check Cocci Ag in the urine any longer.         Old Problems and Infectious Diseases Issues:   1. Significant travel history throughout [place]; was given ivermectin empirically prior to transplant. Serology was negative but was on ritiximab/MMF/high dose steroids when checked.   2. Indeterminate QuantiFERON while on rituximab/MMF/high dose steroids; was in  and traveled to endemic areas but tubeculin skin test was always negative on multiple occasions prior to becoming immunocompromised. Was not deemed to have LTBI.     Other Infectious Disease issues include:  -   - PCP prophylaxis: bactrim.  - Serostatus: CMV D-/R-, EBV D+/R+, HSV1+/2+, VZV +, Toxo -/-. Received ACV ppx until 11/15/2021.    - Immunization status: Too early to discuss. Did not receive the flu, the pneumonia vaccines or the COVID-19 vaccine  - Gamma globulin status: 198 as of 11/17/2021, received IVIG on 10/21/2021 and 11/18/2021       Thank you very much Dr. Corral for involving me in the care of Mr. Edson Thornton. Please do not hesitate to call me for any question.     Attestation:  I interviewed the patient and obtained history from the patient, and by reviewing the patient's chart including outside records, microbiological data, and radiological data. All data are summarized in this notes.  Ming  JOSE Abebe MD  [facility]  Contact information available via [facility] Paging/Directory    11/23/2021             History of Present Illness:   Transplants:  10/16/2021 (Lung), Postoperative day:  38     This patient is a 56 year old male with ILD due to RA was on MMF, rituximab and high dose steroids prior to lung transplant. S/p Bi lung transplant.   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   Also complicated by candidemia with C albicans on 10/202/2021 in the setting of respiratory samples following transplantation with C albicans. Ophthalmology exam was unremarkable.   micafungin was started on 10/22/2021 and switched to fluconazole on 10/26/2021.   The first negative blood cx were from 10/23/2021.   The patient also underwent left chest tube placement.     On repeat CT chest on 10/22/2021, he was found to have LLL cavity. TID were reconsulted.   The patient has no fever. No shortness of breath. The left chest tube in place but has not been draining.       Exposure History in a prior visit:  Was born in [place], lived in many states including [facility] and [facility]. Currently lives in [place] in a house with his girlfriend/fiancee and her son who's fully vaccinated. No pets. He used to work as a  all over the country.   He used to be in the [employer] and was stationed in [facility] and [facility]. He also lived in [place].   He was tested for TB by [facility] on many occasions and was negative.   No significant outdoors exposure.   Institutionalized for ETOH use in the past.           Review of Systems:      As mentioned in the HPI otherwise negative by reviewing constitutional symptoms, central and peripheral neurological systems, respiratory system, cardiac system, GI system,  system, musculoskeletal, skin, allergy, and lymphatics.                  Past Medical History:     Past Medical History:   Diagnosis Date     BRENNAN (acute kidney  injury) (H) 10/17/2021     Anxiety      Depression      HLD (hyperlipidemia)      HTN (hypertension)      Hypothyroidism      ILD (interstitial lung disease) (H)      SALTY on CPAP      Oxygen dependent     BL 4L since ~6/2021     Rheumatoid arthritis (H)     signs ~5/2020, dx 5/2021     S/P lung transplant (H) 10/16/2021     Shock liver 10/17/2021     Steroid-induced hyperglycemia      Traction bronchiectasis (H)             Past Surgical History:     Past Surgical History:   Procedure Laterality Date     COLONOSCOPY W/ BIOPSIES AND POLYPECTOMY  07/21/2020     CV CORONARY ANGIOGRAM N/A 09/08/2021    Procedure: Coronary Angiogram with possible intervention;  Surgeon: Jovon Bullock MD;  Location:  HEART CARDIAC CATH LAB     CV RIGHT HEART CATH MEASUREMENTS RECORDED N/A 09/08/2021    Procedure: Right Heart Cath;  Surgeon: Jovon Bullock MD;  Location:  HEART CARDIAC CATH LAB     ESOPHAGOSCOPY, GASTROSCOPY, DUODENOSCOPY (EGD), COMBINED N/A 10/23/2021    Procedure: ESOPHAGOGASTRODUODENOSCOPY (EGD);  Surgeon: Miquel Pisano MD;  Location:  GI     ESOPHAGOSCOPY, GASTROSCOPY, DUODENOSCOPY (EGD), COMBINED N/A 11/02/2021    Procedure: ESOPHAGOGASTRODUODENOSCOPY (EGD);  Surgeon: Daniel Ortiz MD;  Location:  GI     ESOPHAGOSCOPY, GASTROSCOPY, DUODENOSCOPY (EGD), COMBINED N/A 11/5/2021    Procedure: ESOPHAGOGASTRODUODENOSCOPY (EGD);  Surgeon: Ronnell Hernandez MD;  Location: [facility] GI     EXTRACTION(S) DENTAL N/A 09/22/2021    Procedure: EXTRACTION tooth #19;  Surgeon: Deepak Tobin DDS;  Location: [facility] OR     HERNIA REPAIR       IR CHEST TUBE PLACEMENT NON-TUNNELLED LEFT  11/03/2021     PICC TRIPLE LUMEN PLACEMENT Left 11/04/2021    5FR TL PICC. Right non occlusive thrombus subclavian vein.     REPLACE GASTROJEJUNOSTOMY TUBE, PERCUTANEOUS N/A 11/9/2021    Procedure: REPLACEMENT, GASTROJEJUNOSTOMY TUBE, PERCUTANEOUS;  Surgeon: Hernando Rodriguez MD;  Location: [facility] GI     right acl        TRACHEOSTOMY PERCUTANEOUS N/A 10/29/2021    Procedure: Percutaneous Tracheostomy,;  Surgeon: Celine Jenkins MD;  [facility]     TRANSPLANT LUNG RECIPIENT SINGLE X2 Bilateral 10/16/2021    Procedure: TRANSPLANT, LUNG, RECIPIENT, BILATERAL, Bronchoscopy, on-pump perfusion, bilateral clamshell sternotomy;  Surgeon: Yanick Corral MD;  [facility]     XR ACROMIOCLAVICULAR JOINT BILATERAL              Social History:     Social History     Tobacco Use     Smoking status: Never Smoker     Smokeless tobacco: Never Used   Substance Use Topics     Alcohol use: Never            Family History:   I have reviewed this patient's family history  Family History   Problem Relation Age of Onset     Diabetes Type 1 Mother      Heart Disease Mother      Chronic Obstructive Pulmonary Disease Mother      Rheumatoid Arthritis Father      Emphysema Paternal Grandfather             Immunizations:     TDAP 02/15/2018     Zoster Recombinant (Shingrix)                Allergies:   No Known Allergies          Medications:   Medications that Require Transfusion:     dextrose Stopped (10/24/21 1008)     Warfarin Therapy Reminder       Scheduled Medications:     acetylcysteine  2 mL Nebulization 4x Daily     amLODIPine  5 mg Per G Tube Daily     calcium carbonate 600 mg-vitamin D 400 units  1 tablet Oral or Feeding Tube BID w/meals     escitalopram  5 mg Oral or Feeding Tube Daily     fluconazole  400 mg Intravenous Q24H     folic acid-vit B6-vit B12  1 tablet Per G Tube Daily     furosemide  40 mg Intravenous BID     heparin lock flush  5-20 mL Intracatheter Q24H     insulin aspart  1-12 Units Subcutaneous Q4H     insulin glargine  30 Units Subcutaneous BID     levalbuterol  1.25 mg Nebulization 4x Daily     levothyroxine  25 mcg Oral Daily     lidocaine  2 patch Transdermal Q24H     lidocaine   Transdermal Q8H     melatonin  10 mg Oral QPM     multivitamins w/minerals  15 mL Per Feeding Tube Daily     mycophenolate   1,000 mg Per J Tube BID     nystatin  1,000,000 Units Swish & Swallow 4x Daily     pantoprazole  40 mg Per Feeding Tube BID     piperacillin-tazobactam  4.5 g Intravenous Q6H     predniSONE  10 mg Per J Tube QPM     predniSONE  12.5 mg Per J Tube Daily     QUEtiapine  25 mg Oral or Feeding Tube BID     QUEtiapine  50 mg Oral At Bedtime     rosuvastatin  10 mg Oral or Feeding Tube Daily     sodium chloride (PF)  10-40 mL Intracatheter Q8H     sodium chloride (PF)  3 mL Intracatheter Q8H     sulfamethoxazole-trimethoprim  1 tablet Oral or Feeding Tube Daily     tacrolimus  2 mg Oral QAM     tacrolimus  2.5 mg Oral QPM               Physical Exam:   Temp: 98  F (36.7  C) Temp src: Oral BP: (!) 150/89 Pulse: 78   Resp: 14 SpO2: 97 % O2 Device: Trach dome Oxygen Delivery: 30 LPM    Wt Readings from Last 4 Encounters:   11/23/21 60 kg (132 lb 4.4 oz)     Constitutional: awake, alert, cooperative, no apparent distress and appears at stated age, well nourished.   Head, ENT, Eyes, and Neck: Normocephalic, sinuses non-tender to palpation, external ears without lesions, moist buccal mucosa without oral thrush or ulcers, tonsils without swelling, erythema, or exudate, no tenderness palpating teeth, good dentition, gums without necrosis or abscesses.   PERRL, EOMI, pink conjunctivae, non-icteric sclera.   Neck supple without rigidity, no cervical/axillary/inguinal LA bilaterally.    Lungs: CTA bilaterally, no accessory muscle use.   CVS: RRR, normal S1/S2, no murmur, PMI was not displaced.   Abdomen: non-tender, non-distended, no masses, no bruit, no shifting dullness, normal BS.   Extremities: no pitting edema of bilateral lower extremities, no ulcers, normal ROM of all joints, no swelling or erythema of any of joints and no tenderness to palpation.   Skin: no induration, fluctuation or discharge at the clamshell surgical site, and no rash            Data:   No results found for: ACD4    Inflammatory Markers    Recent Labs    Lab Test 10/29/21  0542 10/14/21  0943 10/12/21  1038 10/10/21  1024 10/08/21  0947 10/06/21  1007 10/04/21  0432 10/02/21  0528   SED 87*  --   --   --   --   --   --   --    CRP 53.0* 23.0* 17.0* 30.0* 34.0* 35.0* 23.0* 28.0*       Immune Globulin Studies     Recent Labs   Lab Test 11/17/21  0337 10/15/21  0907 09/07/21  1324 09/07/21  1100   * 265* 363*  363*  --    IGM  --   --  51  --    IGE  --   --   --  83   IGA  --   --  103  --        Metabolic Studies       Recent Labs   Lab Test 11/23/21  1254 11/23/21  0919 11/23/21  0337 11/23/21  0332 11/23/21  0017 11/22/21  2004 11/22/21  1636 11/22/21  1635 11/22/21  0403 11/22/21  0359 11/21/21  0336 11/21/21  0333 11/20/21  1639 11/20/21  1635 11/20/21  0807 11/20/21  0318 11/15/21  0749 11/15/21  0344 11/04/21  1019 11/04/21  1016 11/04/21  0351 11/04/21  0339 10/22/21  1602 10/22/21  1601 10/22/21  0959 10/22/21  0958   NA  --   --   --  146*  146*  --   --   --  144  --  144  144  --  141  141  --  150*  --  144  144   < > 140  140   < >  --    < > 148*  148*   < > 147*  --  147*   POTASSIUM  --   --   --  4.5  4.5  --   --   --  5.0  --  4.4  4.4  --  3.8  3.8  --  3.6  --  3.5  3.5   < > 4.6  4.6   < >  --    < > 4.0  4.0   < > 3.6  --  3.5   CHLORIDE  --   --   --  112*  112*  --   --   --  114*  --  112*  112*  --  106  106  --  112*  --  109  109   < > 106  106   < >  --    < > 121*  121*   < > 109  --  106   CO2  --   --   --  30  30  --   --   --  26  --  26  26  --  29  29  --  26  --  28  28   < > 31  31   < >  --    < > 23  23   < > 31  --  34*   ANIONGAP  --   --   --  4  4  --   --   --  4  --  6  6  --  6  6  --  12  --  7  7   < > 3  3   < >  --    < > 4  4   < > 7  --  7   BUN  --   --   --  56*  56*  --   --   --  47*  --  53*  53*  --  55*  55*  --  43*  --  36*  36*   < > 37*  37*   < >  --    < > 86*  86*   < > 68*  --  62*   CR  --   --   --  0.84  0.84  --   --   --  0.74  --  0.82  0.82  --   0.86  0.86  --  0.76  --  0.72  0.72   < > 0.47*  0.47*   < >  --    < > 0.88  0.88   < > 1.47*  --  1.54*   GFRESTIMATED  --   --   --  >90  >90  --   --   --  >90  --  >90  >90  --  >90  >90  --  >90  --  >90  >90   < > >90  >90   < >  --    < > >90  >90   < > 53*  --  50*   * 105* 182* 195*  195* 174* 159*   < > 162*   < > 191*  191*   < > 204*  204*   < > 105*   < > 180*  180*   < > 191*  191*   < >  --    < > 138*  138*   < > 148*   < > 92   A1C  --   --   --   --   --   --   --   --   --   --   --   --   --   --   --   --   --  5.3  --   --   --   --   --   --   --   --    ERIK  --   --   --  9.1  9.1  --   --   --  8.8  --  9.4  9.4  --  9.0  9.0  --  8.0*  --  7.7*  7.7*   < > 8.7  8.7   < >  --    < > 8.7  8.7   < > 8.2*  --  7.9*   PHOS  --   --   --  4.1  --   --   --   --   --  4.0  --  4.1  --   --   --  4.1   < > 3.6   < >  --   --  3.7   < > 2.8  --   --    MAG  --   --   --  2.1  --   --   --   --   --  1.9  --  2.7*  --  2.0  --  1.5*   < > 1.5*   < >  --   --  2.0   < > 2.1  --   --    LACT  --   --   --   --   --   --   --   --   --   --   --   --   --   --   --   --   --   --   --  0.8  --  1.4   < >  --    < >  --    CKT  --   --   --   --   --   --   --   --   --   --   --   --   --   --   --   --   --   --   --   --   --   --   --  51  --  55    < > = values in this interval not displayed.       Hepatic Studies    Recent Labs   Lab Test 11/23/21  0332 11/22/21  0359 11/21/21  0333 11/20/21  0318 11/19/21  0341 11/18/21  0338 09/30/21  1059 09/19/21  1629   BILITOTAL 0.4 0.2 0.2 0.2 0.2 0.2   < >  --    ALKPHOS 132 127 133 111 123 123   < >  --    ALBUMIN 2.4* 2.3* 2.4* 1.8* 1.9* 1.8*   < >  --    AST 25 22 20 13 18 14   < >  --    ALT 31 30 29 23 29 28   < >  --    LDH  --   --   --   --   --   --   --  423*    < > = values in this interval not displayed.       Pancreatitis testing    Recent Labs   Lab Test 10/22/21  0407 10/21/21  0354 10/20/21  0308 10/19/21  0338  09/07/21  1324 09/07/21  1100   AMYLASE  --   --   --   --   --  32   TRIG 307* 486* 383* 458* 364*  --        Hematology Studies      Recent Labs   Lab Test 11/23/21  0332 11/22/21  0359 11/21/21  0333 11/20/21  0318 11/19/21  0341 11/18/21  0338 11/02/21  1302 11/02/21  1053 11/02/21  0832 11/02/21  0815 11/02/21  0556 11/01/21  0818 10/25/21  0326 10/24/21  0410 10/23/21  0344   WBC 11.3* 13.5* 12.2* 11.2* 11.1* 13.1*   < > 57.4*  57.4*   < > 37.5* 32.6* 28.4*   < > 21.4* 21.3*   ANEU  --   --   --   --   --   --   --  49.9*  --  28.5* 29.7* 25.3*  --  19.9* 20.0*   ALYM  --   --   --   --   --   --   --  4.0  --  4.9 1.3 1.1  --  0.4* 0.2*   DONALD  --   --   --   --   --   --   --  1.7*  --  2.3* 0.7 1.1  --  0.9 1.1   AEOS  --   --   --   --   --   --   --  0.0  --  0.0 0.0 0.0  --  0.0 0.0   HGB 9.6* 9.5* 9.2* 7.6* 8.0* 8.0*   < > 8.8*   < > 5.8* 8.2* 9.6*   < > 9.8* 9.6*   HCT 30.5* 30.7* 30.1* 25.2* 25.0* 25.5*   < > 26.8*   < > 19.6* 27.3* 31.4*   < > 30.5* 30.2*   PLT 79* 81* 86* 78* 100* 117*   < > 244   < > 382 355 308   < > 201 132*    < > = values in this interval not displayed.       Clotting Studies    Recent Labs   Lab Test 11/23/21  0332 11/22/21  0359 11/21/21  0333 11/20/21  0318 11/03/21  0407 11/02/21  1053 11/02/21  0927 11/02/21  0908 11/02/21  0832 10/23/21  0344 10/22/21  1407   INR 3.41* 3.47* 2.95* 2.12*   < > 1.55*  --    < > 1.38*   < > 1.28*   PTT  --   --   --   --   --  34 32  --  50*  --  43*    < > = values in this interval not displayed.       Arterial Blood Gas Testing    Recent Labs   Lab Test 11/22/21  0426 11/22/21  0103 11/21/21  1029 11/20/21  0324 11/14/21  0534 11/11/21  1740 11/07/21  0626 11/06/21  0438 11/05/21  0504 11/05/21  0336 11/04/21  1016   PH  --   --   --   --   --  7.48*  --  7.43 7.49* 7.49* 7.46*   PCO2  --   --   --   --   --  37  --  37 31* 32* 35   PO2  --   --   --   --   --  106*  --  109* 126* 331* 95   HCO3  --   --   --   --   --  28  --  25 24 24  25   O2PER 40 40 40 40   < > 40   < > 40 40 40 40    < > = values in this interval not displayed.        Urine Studies     Recent Labs   Lab Test 11/01/21  1336 10/25/21  1507 10/22/21  1641 10/17/21  1642 10/17/21  1041   URINEPH 5.5 5.5 7.5* 5.0 5.0   NITRITE Negative Negative Negative Negative Negative   LEUKEST Negative Negative Negative Negative Trace*   WBCU 0 2 3 25* 44*       Vancomycin Levels     Recent Labs   Lab Test 10/18/21  0403   VANCOMYCIN 12.6       Tobramycin levels     No lab results found.    Gentamicin levels    No lab results found.    Tacrolimus levels    Invalid input(s): TACROLIMUS, TAC, TACR  Transplant Immunosuppression Labs Latest Ref Rng & Units 11/23/2021 11/23/2021 11/22/2021 11/22/2021 11/22/2021   Creat 0.66 - 1.25 mg/dL 0.84 0.84 0.74 0.82 0.82   BUN 7 - 30 mg/dL 56(H) 56(H) 47(H) 53(H) 53(H)   WBC 4.0 - 11.0 10e3/uL - 11.3(H) - - 13.5(H)   Neutrophil % - - - - -   ANEU 1.6 - 8.3 10e3/uL - - - - -       Cyclosporine levels    Invalid input(s): CYCLOSPORINE, CYC    Mycophenolate levels    Invalid input(s): MYPA, MYP    Sirolimus levels    Invalid input(s): SIROLIMUS, SIR, RAPA    CSF testing     Recent Labs   Lab Test 10/29/21  1221   CWBC 80*   CNEU 94   CLYM 3   CMONO 4   CRBC 9,000*   CGLU 83*   CTP 97*         Microbiology:  Blood cx reviewed in the HPI and the A/P   Last check of C difficile  C Difficile Toxin B by PCR   Date Value Ref Range Status   11/20/2021 Negative Negative Final     Comment:     A negative result does not exclude actual disease due to C. difficile and may be due to improper collection, handling and storage of the specimen or the number of organisms in the specimen is below the detection limit of the assay.       Virology:  CMV viral loads    CMV DNA IU/mL   Date Value Ref Range Status   11/22/2021 Not Detected Not Detected IU/mL Final   11/16/2021 Not Detected Not Detected IU/mL Final     CMV viral loads    Recent Labs   Lab Test 11/22/21  4821  11/16/21  0357   CMVQNT Not Detected Not Detected       CMV viral loads    CMV DNA IU/mL   Date Value Ref Range Status   11/22/2021 Not Detected Not Detected IU/mL Final   11/16/2021 Not Detected Not Detected IU/mL Final   11/12/2021 Not Detected Not Detected IU/mL Final   10/26/2021 Not Detected Not Detected IU/mL Final       CMV resistance testing  No lab results found.  No results found for: CMVCID, CMVFOS, CMVGAN     No results found for: H6RES    EBV DNA Copies/mL   Date Value Ref Range Status   11/16/2021 Not Detected Not Detected copies/mL Final       CMV Antibody IgG   Date Value Ref Range Status   10/15/2021 No detectable antibody. No detectable antibody.  Final   09/07/2021 No detectable antibody. No detectable antibody.  Final       Toxoplasma Antibody IgG   Date Value Ref Range Status   09/07/2021 <3.0 0.0 - 7.1 IU/mL Final     Comment:     Negative- Absence of detectable Toxoplasma gondii IgG antibodies. A negative result does not rule out acute infection.         Pathology   Native explanted lungs 10/16/2021  A.  LUNG, LEFT NATIVE, PNEUMONECTOMY:  -Nonspecific interstitial pneumonitis (NSIP), predominant fibrotic pattern, with superimposed organizing pneumonia, reactive pneumocyte type II hyperplasia, intra-alveolar clusters of pigmented macrophages, and areas of end-stage fibrosis.  -Three benign hilar lymph nodes.  B.  LUNG, RIGHT NATIVE, PNEUMONECTOMY:  -Nonspecific interstitial pneumonitis (NSIP), predominant fibrotic pattern, with superimposed organizing pneumonia, reactive pneumocyte type II hyperplasia, intra-alveolar clusters of pigmented macrophages, and areas of end-stage fibrosis.  -Four benign hilar lymph nodes.  C.  Lymph node, level 5, excision:  -Fragments of lymph node, negative for malignancy.      Imaging:  CT chest 11/22/21   IMPRESSION:   1. New cavitary lesion in the left lung base with multifocal  groundglass opacities some of which are new compared to CT 11/2/2021,  notably in  the bilateral upper lobes concerning for ongoing infection.  2. Decreased bilateral loculated pleural effusions.  3. New 1.8 cm fluid collection with surrounding fat stranding in the  left axilla.  4. Similar small pericardial effusion.      Ming Abebe MD  [facility]  Contact information available via [facility] Paging/Directory     11/23/2021

## 2021-11-23 NOTE — PROGRESS NOTES
Pulmonary Medicine  Cystic Fibrosis - Lung Transplant Team  Progress Note  2021       Patient: Edson Thornton  MRN: [id number]  : 1965 (age 56 year old)  Transplant: 10/16/2021 (Lung), POD#38  Admission date: 2021    Assessment & Plan:    Edson Thornton is a 56 year old male with a PMH significant for NSIP/ILD, bronchiectasis, moderate PH, RA, SALTY, chronic HSV infection, hypogammaglobulinemia, steroid-induced diabetes, hypothyroidism, PFO, HTN, HLD, duodenal anomaly, anxiety, and depression.  Admitted on 21 from OSH for acute on chronic respiratory failure 2/2 ILD exacerbation, now s/p BSLT on 10/16/21.  Prolonged vent wean s/p trach and PEG/J tube placement with thoracic surgery 10/29.  Post-op course otherwise complicated by encephalopathy and diffuse weakness, acute to subacute CVA, afib with RVR, BRENNAN, GI bleed, Candidemia/Candida empyema, and anxiety.  Code called  for bradycardia/asystole, progressive hypotensive, found to have significant GI bleed, EGD with adherent clot near PEG tube site that was clipped.  Tolerating TD trials since  and ongoing improvement in mentation and weakness.      Today's recommendations:    - TD and will keep cuff down and decreased CPT to BID.  - Diuresis per ICU team  - Tacrolimus repeat level  therapeutic, no dose adjustment. Repeat level  (ordered)  - Prednisone taper  not ordered.  - Coccidioides Ag on - repeat.  - Continue Zosyn for 2 week course through .  - Follow BAL cultures from 21.  - Continue fluconazole (reevaluate around  pending repeat fungal pleural cultures), EKG weekly for QTc monitoring (, ordered).  - Primary team to reach out to Transplant ID regarding CT imaging for recommendations on antibiotic coverage and additional cultures from today's bronch  - Serum Histoplasma, Blastomyces, BDG fungitell, and Aspergillus galactomannan Ag () pending  - Repeat IgG (12/15,  ordered).  - DSA monitoring q2 weeks, (11/29) ordered.  - Consider sampling Rt. effusion for possible candidal infection (no pigtail).     S/p BSLT for ILD:  Acute hypoxic respiratory failure s/p trach:   Bilateral pleural effusions: Unfortunately had not received vaccination for flu, PNA, or COVID-19 PTA.  Explant pathology with NSIP, no malignancy.  PGD 2-3.  Weaned off paralytic 10/19 (for vent dyssynchrony) and Caroline 10/22.  Initial difficulty weaning sedation given agitation then with neurological findings as below.  Prolonged vent wean s/p trach and PEG/J tube placement with thoracic surgery 10/29.  Code called 11/2 for bradycardia/asystole (required 1 round of CPR, no medications) then progressively hypotensive, GI bleed as below.  Chest CT 11/2 with increased bilateral pleural effusions (moderate left, small right), bibasilar atelectasis with area of hypoenhancing parenchyma in LLL (suspicious for infection), and numerous nondisplaced anterior rib fractures bilaterally.  S/p left chest tube placement in IR 11/3 for pleural effusion/empyema (as below), right deferred given very little effusion on US.  Problems with trach cuff leak 11/3 (requiring multiple exchanges), bronch 11/14 with trach in good position with cuff well sealed in trachea and small to moderate secretions mostly in lower lobes. Has tolerated continuous TD 40% since 11/20.   - Nebs: levalbuterol and Mucomyst QID  - Pulmonary toilet with chest physiotherapy QID  - TD as tolerated otherwise bipap for support  - Diuresis per ICU team  - Repeat CT chest (11/22) New cavitary lesion in the left lung base, and with multifocal bilateral upper lobe GGO (some of which are new), new 1.8 cm fluid collection with surrounding fat stranding in the left axilla, decreased bilateral loculated pleural effusions.  - BAL on 11/22/21, results pending.  - CXR daily while chest tube in place, will review today's when completed.     Immunosuppression: s/p induction  therapy with basiliximab 10/16 (and high dose IV steroid) and 10/20  - Tacrolimus 2 mg BID (suspension, via G tube).  Goal level 8-12. Repeat level 11/22 therapeutic at 10.3, no dose adjustment. Repeat level 11/26 (ordered)  - MMF 1000 mg BID (11/2, decreased given GI bleed, AZA to be avoided given TPMT)  - Prednisolone at 12.5 mg qAM, 10 mg qPM, next taper due 12/5 (not ordered)  Date AM dose (mg) PM dose (mg)   11/21/21 12.5 10   12/5/21 10 10   1/2/22 10 7.5   1/30/22 7.5 7.5   2/27/22 7.5 5   3/27/22 5 5   4/24/22 5 2.5      Prophylaxis:   - Bactrim for PJP ppx (held 11/2-11/5 d/t BRENNAN)  - Nystatin for oral candidiasis ppx, 6 month course  - See below for serologies and viral ppx:    Donor Recipient Intervention   CMV status Negative Negative None, CMV monthly (due 12/14, neg 11/16)   EBV status Positive Positive None, EBV monthly (due 12/14, neg 11/16)   HSV status N/A Positive S/p acyclovir POD #1-30 (recent infection history pre-txp)      ID: Concern for possible Strongyloides exposure pre-transplant s/p ivermectin x1 dose (9/17).  Donor and recipient cultures NGTD.  S/p IV ceftazidime/vancomycin for 48h per protocol and additional empiric ceftazidime 10/19-10/23 given recurrent fevers as below.  Cryptococcal Ag negative 10/23.   - Monthly Coccidioides Ag x12 months post-transplant per ID (urine and serum negative 10/17), 11/17 urine neg, serum pending  - Serum Histoplasma and Blastomyces (11/22) pending  - Primary team to discuss CT imaging with Transplant ID for recommendations on antibiotic coverage    Recurring fevers, Resolved:  Fevers post-op, Tmax 101.7 POD #1.  Febrile with worsening leukocytosis again 10/25, generally persisting.  LP (10/29), xanthochromic with pleocytosis thought to be appropriate given RBC and WBCs, no ABX recommended per transplant ID and neurology.  S/p empiric meropenem 10/28-11/2.  Now afebrile, ABX as above.     Klebsiella pneumoniae:  Pseudomonas fluorescens/putida: Klebsiella  initially noted trach sputum culture 11/10.  Started on ceftriaxone 11/11, transitioned to ertapenem 11/12-11/13, and back to ceftriaxone 11/14.  Bronch culture 11/12 with Klebsiella and Pseudomonas fluorescens/putida (R-meropenem).     - ABX: Zosyn (11/15 - 11/23/2021) Started on IV ceftaz/leva (11/23 - now).      Disseminated Candida: Noted on blood cultures 10/20 and 10/22.  BDG fungitell positive (399) on 10/20.  Respiratory cultures with persistent Candida albicans.  TC 10/23 without evidence of endocarditis.  Ophthalmology consult 10/24 with benign dilated fundoscopic exam.  Candida empyema also noted 10/25, chest tubes inadvertently removed by CVTS 10/28, left chest tube replaced by IR 11/3 as above with ongoing Candida on cultures.   - BDG fungitell and Aspergillus galactomannan Ag (11/22)  pending  - Repeat pleural fungal cultures from 11/18 is positive. and the fungal/bacterial blood cultures (11/18), NGTD  - Fluconazole (10/26 per transplant ID, prior micafungin 10/22-10/27), repeat EKG for QTc monitoring 11/23 (ordered), LFTs as below (see transplant ID note 11/5).  - Will consider sampling Rt. effusion.     HSV: Chronic intermittent active infection pre-transplant with recent HSV infection: crusted lesions throughout left side of jaw, s/p 10 day treatment course of ACV through 10/9.  HSV PCR blood negative 10/17.  S/p ACV ppx as above (started POD #1 instead of POD #8 given HSV history and location).     Hypogammaglobulinemia: IgG previously low at 364 (9/7).  Noted at 265 at time of transplant, s/p IVIG 10/21, repeat IgG (11/17) 198, s/p IVIG (with premedication) 11/18.  - Repeat IgG (12/15, ordered)     Positive cross match: Note that he received two doses of rituximab in June, which is likely contributing to cross match result.  DSA most recently negative 11/15.  - DSA monitoring q2 weeks, (11/29) ordered     SALTY: Noted pre-transplant.  Home CPAP 6-12 cm H2O.  - Evaluate need for BiPAP after  decannulation.     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  MRI 10/23 with multifocal subacute infarcts within both cerebral hemispheres and left cerebellum.  DDx include surgery v embolic v infectious.  Heparin drip started 10/23 (intermittently held with GI bleed as below).  Repeat stroke code 10/25 d/t marked decrease in responsiveness with sedation wean, pupil inequality, and absent gag reflex.  CTA head without obvious new pathology, MRI brain (with and without contrast) primarily revealing for infarct, low likelihood of PRES.  Ammonia normal.  VEEG per neuro with severe diffuse encephalopathy.  Gradual improvement noted since 10/29, repeat head CT 11/2 (following code) without new acute intracranial abnormalities.  - AC management per primary team as below  - PT/OT     Afib with RVR: Noted 10/18, started on amiodarone drip and converted to SR, transitioned to PO 10/21, decreased 10/29. Metoprolol and amiodarone discontinued 11/20 d/t intermittent bradycardia. AC per primary team.     Right subclavian DVT: Seen on UE US from 11/4.  Heparin drip resumed 11/5, transitioned to warfarin 11/20 and in the setting of thrombocytopenia. HIT negative (11/21).      Recurrent GI bleed, Resolved: Hemoglobin dropped to 6.6 10/22, s/p 2 units pRBC.  OG tube with bloody output, EGD 10/23 noted NJ/OG tube trauma with scant oozing.  Progressive hypotension 11/2, hemoglobin 5.8 with bloody G tube output.  Heparin drip held, transitioned to PPI drip, and MTP activated.  EGD 11/2 with large amount of clotted blood in stomach and area of raised mucosa with small adherent clot near PEG tube site that was clipped, no active bleeding.  Maroon stools 11/3, repeat  "EGD with extensive old blood in stomach, no active bleeding, small nodular area with prior clips clipped again.  Most recent EGD 11/5 with ulcer noted at PEG tube bumper site, gastritis, and suction marks from G tube. TF noted in G tube drainage bag 11/7, AXR with GJ tube tip projecting over proximal duodenum. GJ tube exchanged 11/9 by GI.      Elevated LFTs: Shock liver post-op, now resolved.  Intermittent alk phos elevation.     Hypomagnesemia: Suppressed reabsorption 2/2 CNI.  Requiring nearly daily IV replacement.  - IV and PO magnesium supplementation      We appreciate the excellent care provided by the CVICU and CVTS teams.  Recommendations communicated via in person rounding and this note.  Will continue to follow along closely, please do not hesitate to call with any questions or concerns.         Subjective & Interval History:     Breathing comfortably on TD 40%, 40L. Minimal cough. Denies pain. Reports anxiety better controlled. No nausea, reflux, or abdominal discomfort. Loose stools continue, improving. CPT x3 yesterday. Left chest tube remains, only 10cc on 11/22/21.    Review of Systems:     ROS as above otherwise limited due to communication barriers    Physical Exam:     Vital signs:  Temp: 98  F (36.7  C) Temp src: Oral BP: (!) 133/90 Pulse: 85   Resp: 14 SpO2: 97 % O2 Device: Trach dome Oxygen Delivery: 30 LPM Height: 162.6 cm (5' 4\") Weight: 60 kg (132 lb 4.4 oz)  I/O:     Intake/Output Summary (Last 24 hours) at 11/23/2021 1718  Last data filed at 11/23/2021 1704  Gross per 24 hour   Intake 2280 ml   Output 2625 ml   Net -345 ml       Constitutional: lying in bed, in no apparent distress.   HEENT: Eyes with pink conjunctivae, anicteric.  Oral mucosa moist without lesions.  Trache cdi  PULM: Fair air flow bilaterally.  Bibasilar crackles, no rhonchi, no wheezes.  Non-labored breathing on TD.  CV: Normal S1 and S2.  RRR.  No murmur, gallop, or rub.  No peripheral edema.   ABD: NABS, soft, " nontender, nondistended.  PEG/J not visualized  MSK: Able to squeeze left > right hand and move BLE.  + apparent muscle wasting.   NEURO: Alert, follows commands, nonverbal communication by head nod/shake  SKIN: Warm, dry.  No rash on limited exam.   PSYCH: Mood stable.     Lines, Drains, and Devices:  Peripheral IV 11/02/21 Anterior;Right Upper forearm (Active)   Site Assessment WDL 11/22/21 0800   Line Status Saline locked 11/22/21 0800   Dressing Intervention Dressing reinforced 11/08/21 2000   Phlebitis Scale 0-->no symptoms 11/22/21 0800   Infiltration Scale 0 11/22/21 0800   Infiltration Site Treatment Method  None 11/21/21 0400   Number of days: 20       PICC Triple Lumen 11/04/21 Left Basilic Access. PICC okay to use. (Active)   Site Assessment [facility] 11/22/21 0800   External Cath Length (cm) 1 cm 11/04/21 1747   Extremity Circumference (cm) 28 cm 11/21/21 0000   Dressing Intervention Chlorhexidine patch;Transparent 11/22/21 0800   Dressing Change Due 11/28/21 11/22/21 0800   Mosley - Status saline locked 11/22/21 0800   [place]osley - Cap Change Due 11/23/21 11/22/21 0800   Red - Status infusing 11/22/21 0800   Red - Cap Change Due 11/23/21 11/22/21 0800   White - Status saline locked 11/22/21 0800   White - Cap Change Due 11/23/21 11/22/21 0800   Extravasation? No 11/17/21 2000   Line Necessity Yes, meets criteria 11/22/21 0800   Number of days: 18     Data:     LABS    CMP:   Recent Labs   Lab 11/23/21  1254 11/23/21  0919 11/23/21  0337 11/23/21  0332 11/22/21  1636 11/22/21  1635 11/22/21  0403 11/22/21  0359 11/21/21  0336 11/21/21  0333 11/20/21  1639 11/20/21  1635 11/20/21  0807 11/20/21  0318   NA  --   --   --  146*  146*  --  144  --  144  144  --  141  141  --  150*  --  144  144   POTASSIUM  --   --   --  4.5  4.5  --  5.0  --  4.4  4.4  --  3.8  3.8  --  3.6  --  3.5  3.5   CHLORIDE  --   --   --  112*  112*  --  114*  --  112*  112*  --  106  106  --  112*  --  109  109   CO2  --   --   --  30   30  --  26  --  26  26  --  29  29  --  26  --  28  28   ANIONGAP  --   --   --  4  4  --  4  --  6  6  --  6  6  --  12  --  7  7   * 105* 182* 195*  195*   < > 162*   < > 191*  191*   < > 204*  204*   < > 105*   < > 180*  180*   BUN  --   --   --  56*  56*  --  47*  --  53*  53*  --  55*  55*  --  43*  --  36*  36*   CR  --   --   --  0.84  0.84  --  0.74  --  0.82  0.82  --  0.86  0.86  --  0.76  --  0.72  0.72   GFRESTIMATED  --   --   --  >90  >90  --  >90  --  >90  >90  --  >90  >90  --  >90  --  >90  >90   ERIK  --   --   --  9.1  9.1  --  8.8  --  9.4  9.4  --  9.0  9.0  --  8.0*  --  7.7*  7.7*   MAG  --   --   --  2.1  --   --   --  1.9  --  2.7*  --  2.0  --  1.5*   PHOS  --   --   --  4.1  --   --   --  4.0  --  4.1  --   --   --  4.1   PROTTOTAL  --   --   --  6.2*  --   --   --  6.1*  --  6.4*  --   --   --  5.4*   ALBUMIN  --   --   --  2.4*  --   --   --  2.3*  --  2.4*  --   --   --  1.8*   BILITOTAL  --   --   --  0.4  --   --   --  0.2  --  0.2  --   --   --  0.2   ALKPHOS  --   --   --  132  --   --   --  127  --  133  --   --   --  111   AST  --   --   --  25  --   --   --  22  --  20  --   --   --  13   ALT  --   --   --  31  --   --   --  30  --  29  --   --   --  23    < > = values in this interval not displayed.     CBC:   Recent Labs   Lab 11/23/21  0332 11/22/21  0359 11/21/21 0333 11/20/21 0318   WBC 11.3* 13.5* 12.2* 11.2*   RBC 3.15* 3.13* 3.02* 2.47*   HGB 9.6* 9.5* 9.2* 7.6*   HCT 30.5* 30.7* 30.1* 25.2*   MCV 97 98 100 102*   MCH 30.5 30.4 30.5 30.8   MCHC 31.5 30.9* 30.6* 30.2*   RDW 16.7* 16.6* 16.4* 16.4*   PLT 79* 81* 86* 78*       INR:   Recent Labs   Lab 11/23/21  0332 11/22/21  0359 11/21/21 0333 11/20/21 0318   INR 3.41* 3.47* 2.95* 2.12*       Glucose:   Recent Labs   Lab 11/23/21  1254 11/23/21  0919 11/23/21  0337 11/23/21  0332 11/23/21  0017 11/22/21 2004   * 105* 182* 195*  195* 174* 159*       Blood Gas:   Recent Labs    Lab 11/22/21  0426 11/22/21  0103 11/21/21  1029   PHV 7.37 7.41 7.38   PCO2V 43 42 49   PO2V 34 36 34   HCO3V 25 27 29*   LORI -0.4 1.9 3.1*   O2PER 40 40 40       Culture Data No results for input(s): CULT in the last 168 hours.    Virology Data:   Lab Results   Component Value Date    FLUAH1 Negative 11/22/2021    FLUAH3 Negative 11/22/2021    [id number] Negative 11/22/2021    IFLUB Negative 11/22/2021    RSVA Negative 11/22/2021    RSVB Negative 11/22/2021    PIV1 Negative 11/22/2021    PIV2 Negative 11/22/2021    PIV3 Negative 11/22/2021    HMPV Negative 11/22/2021    HRVS Negative 11/22/2021    ADVBE Negative 11/22/2021    ADVC Negative 11/22/2021    ADVC Negative 11/12/2021    ADVC Negative 10/17/2021       Historical CMV results (last 3 of prior testing):  Lab Results   Component Value Date    CMVQNT Not Detected 11/22/2021    CMVQNT Not Detected 11/16/2021    CMVQNT Not Detected 11/12/2021     No results found for: CMVLOG    Urine Studies    Recent Labs   Lab Test 11/01/21  1336 10/25/21  1507   URINEPH 5.5 5.5   NITRITE Negative Negative   LEUKEST Negative Negative   WBCU 0 2       Most Recent [place] Pulmonary Function Testing (FVC/FEV1 only)  No results found for: 20002  No results found for: 20003  No results found for: 20015  No results found for: 20016    IMAGING    Recent Results (from the past 48 hour(s))   XR Chest Port 1 View    Narrative    Exam: XR CHEST PORT 1 VIEW, 11/21/2021 4:28 AM    Comparison: Chest x-ray 11/20/2021    History: s/p lung transplant    Findings:  Single portable AP semiupright view of the chest is obtained.  Postoperative changes of bilateral lung transplantation, clamshell  sternotomy wires are intact. Tracheostomy tube tip is in the thoracic  trachea. Left upper extremity PICC tip terminates in the superior  cavoatrial junction. Left basilar chest tube in place.    Trachea is midline. Mediastinum is within normal limits.  Cardiopulmonary silhouette is within normal limits.  Trace bilateral  effusions and associated atelectasis. No appreciable pneumothorax. The  upper abdomen is unremarkable.      Impression    Impression:   1. Slightly reduced bilateral effusions.  2. Reduced left basilar atelectasis.  3. Slightly increased right basilar atelectasis.    I have personally reviewed the examination and initial interpretation  and I agree with the findings.    SHANDRA CEVALLOS MD         SYSTEM ID:  [id number]   CT Chest w/o Contrast    Narrative    EXAMINATION: CT CHEST W/O CONTRAST, 11/22/2021 6:02 AM    CLINICAL HISTORY: Abnormal xray - pleural effusion    COMPARISON: Radiograph 11/21/2021, 11/20/2021. CT 11/2/2021.    TECHNIQUE: CT imaging obtained through the chest without contrast.  Coronal, sagittal and axial MIP reformatted images obtained and  reviewed.     FINDINGS:    Lines, tubes, devices: Tracheostomy in place. Left upper extremity  PICC terminating at the superior cavoatrial junction. Left basilar  chest tube. Median sternotomy wires are intact.    Lungs: Post surgical changes of bilateral lung transplantation. The  central tracheobronchial tree is patent. Peripheral mucous plugging in  the bilateral lower lobes. Multifocal groundglass opacities some of  which are new compared to CT 11/2/2021. Interval development of a  cavitary lesion of the left lung base measuring 2.7 x 2.9 cm (series 6  image 190).  Decreased bilateral loculated pleural effusions with  associated compressive atelectasis.    Mediastinum: The visualized thyroid is unremarkable.Heart size is  enlarged. Small pericardial effusion. Mild coronary artery  calcifications. The thoracic aorta and main pulmonary artery are  within normal limits. Standard branching pattern of the great vessels.  No abnormal thoracic lymph nodes.    Bones and soft tissues: Subacute bilateral rib fractures. 1.2 x 1.8 cm  fluid collection in the left axilla with surrounding fat stranding  (series 2 image 27). No suspicious osseous lesion.  Mild degenerative  changes of the thoracolumbar spine.    Upper abdomen:  Limited evaluation of the upper abdomen. No acute  pathology of the visualized solid organs. Percutaneous  gastrojejunostomy tube.      Impression    IMPRESSION:   1. New cavitary lesion in the left lung base with multifocal  groundglass opacities some of which are new compared to CT 11/2/2021,  notably in the bilateral upper lobes concerning for ongoing infection.  2. Decreased bilateral loculated pleural effusions.  3. New 1.8 cm fluid collection with surrounding fat stranding in the  left axilla.  4. Similar small pericardial effusion.    I have personally reviewed the examination and initial interpretation  and I agree with the findings.    ARIES DELGADO MD         SYSTEM ID:  [id number]

## 2021-11-23 NOTE — PLAN OF CARE
Major Shift Events: Trach dome 40% 40L. SR. Hemodynamically stable. Good urine output. No acute events overnight.   Plan: Wean O2  For vital signs and complete assessments, please see documentation flowsheets.

## 2021-11-23 NOTE — PROGRESS NOTES
"THORACIC SURGERY PROGRESS NOTE    S:  Resting in bed. Tube feeds running without clog or issue. Tracheostomy in place without concern from nursing or patient.     O:  BP (!) 133/90   Pulse 85   Temp 98  F (36.7  C) (Oral)   Resp 14   Ht 1.626 m (5' 4\")   Wt 60 kg (132 lb 4.4 oz)   SpO2 97%   BMI 22.71 kg/m      I/O last 3 completed shifts:  In: 2610 [I.V.:615; NG/GT:515]  Out: 3225 [Urine:2725; Emesis/NG output:125; Stool:375]    Alert  Nonlabored breathing on trach/vent  RRR  S NT ND, PEGJ in place 2.5 cm at skin, Fastener sites in place. G to gravity.   Distal extremities warm.     Labs/Imaging  reviewed     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 He s/p tracheostomy and pegj on 10/29.     - trach without apparent issue, PEGJ without apparent issue  - thoracic will be available, please call with questions     Eduardo Alford MD  Surgery Resident PGY-1  Please see [facility] \"Surgery Thoracic/[facility]\" for paging    "

## 2021-11-23 NOTE — PROGRESS NOTES
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 Post-op course complicated encephalopathy, and diffuse weakness, acute to subacute by CVA, afib with RVR, BRENNAN, candidemia/candida empyema, and Code Blue due to bradycardia/asystole and hypotension for significant GIB s/p EGD with adherent clot near PEG tube site s/p clips. Transferred from ICU to medicine on 11/23/2021.     # ILD and NSIP s/p BSLT on 10/16/2021, POD 38  # Acute hypoxic respiratory failure s/p tracheostomy on 10/29  # Bilateral pleural effusions   - Transplant pulmonology following, appreciate recs   - IS: s/p basiliximab on 10/16 and 10/20. Continue tacrolimus 2 mg QAM and 2.5 mg QPM (goal 8-12), MMF 1000 mg BID, and prednisone 12.5 mg QAM and 10 mg QPM  - Ppx: Continue bactrim 400-80 mg daily, nystatin QID x6 months  - Monthly Coccidioides Ag x12 months post-transplant per ID (urine and serum negative 11/17)   - DSA every 2 weeks   - Levalbuterol and mucomyst QID and pulm toilet and chest PT QID  - O2 via trach dome as tolerated otherwise BiPAP for support   - Daily CXR   - Diuresis with IV lasix 40 mg BID  - Oxycodone 5 mg Q4H PRN    - PT/OT/SLP     # L lung cavitary lesion - suspect aspiration vs pseudomonal vs K pneumonia induced abscess. CT on 10/25 with LLL nodular opacity. Repeat CT chest on 11/22 with new cavitary lesion in the left lung base, and with multifocal bilateral upper lobe GGO (some of which are new), new 1.8 cm fluid collection  with surrounding fat stranding in the left axilla, decreased bilateral loculated pleural effusions. Indeterminate Quant Gold, but negative tuberculin skin tests on mulitple occurences. S/p BAL on 11/22. ID feels less likely fungal.   - Transplant ID following   - Follow up histo and blastomyces serum ab, BDG fungitell and aspergillus Ag (11/22)   - Follow up BAL studies   - ID recommends stopping zosyn (11/15-11/23), start Ceftazdime 2 grams Q8H and levaquin 750 mg daily    - Follow up CT chest in 4 weeks    # Klebsiella pneumoniae and Pseudomonas fluorescens/putida HAP- Klebsiella initially noted trach sputum culture 11/10. Bronch culture 11/12 with Klebsiella and Pseudomonas fluorescens/putida (R-meropenem).     - Abx as above.       # Disseminated Candida - + candida BCx 10/20 and 10/22.  BDG fungitell positive (399) on 10/20. Sputum Cx + Candida albicans persistently.  TC 10/23 without evidence of endocarditis. Ophthalmology consult 10/24 with benign dilated fundoscopic exam.  Candida empyema also noted 10/25, chest tubes inadvertently removed by CVTS 10/28, left chest tube replaced by IR 11/3 as above with ongoing Candida on cultures. Repeat pleural fungal cultures and fungal/bacterial blood cultures on 11/18 NGTD. Transplant as noted above following   - L sided chest tube in place  - Follow up BDG fungitell and Aspergillus galactomannan Ag (11/22)   - Initially on Micafungin (10/22-10/27) transition to fluconazole 400 mg IV daily (start date 10/26), with plans to re-evaluate on 11/24  - EKG for QTc monitoring (ordered for 11/27)   - ID recommends evacuate any remaining R and/or left loculated effusions     # Hypogammaglobinemia - s/p IVIG with plan to repeat IgG on 12/15     # Encephalopathy - likely multi-factorial. Multiple brain imaging with stroke as noted below, but negative for PRESS. Ammonia negative. vEEG with severe diffuse encephalopathy. LP as noted below negative for infection. Neurology  previously following.   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 MRI brain on 10/23 with multifocal subacute infarct within both cerebral hemispheres and left cerebellum. Presumed embolic with afib hx.   - Anticoagulation as noted below     # Afib with hx of RVR - [id number] 4 for HTN, CVA, and DM2. First noted on 10/18, started on amiodarone drip, and converted to NSR. Metoprolol and amidoarone discontinued on 11/20 d/t intermittent bradycardia.   - Anticoagulation with warfarin as noted below  - Continue Rosuvastatin 10 mg daily     # R subclavian DVT - dx on 11/4. Resumed on heparin drip on 11/5 and transitioned to warfarin in setting of thrombocytopenia. HIT panel negative on 11/21.   - Continue warfarin per pharmacy protocol    # DM2 with steroid induced hyperglycemia - Hemoglobin A1c 6.6 pre-operatively. Endocrine recently consulted for steroids induced hyperglycemia.   - Continue Lantus 30 mg BID  - Novolog High Sliding scale insulin Q4H  - BG Q4H     # Hypernatremia - Na 146. Currently receiving 100 mL of free water via TF Q4H. Trend BMP     # Hypomagnesemia - Likely 2/2 suppressed reabsorption from CNI.   - Sliding scale replacement protocol      # Diarrhea - C. Diff negative on 11/20. Rectal tube in place on 11/22. Possibly due to tube feeds or magnesium supplementation.   - Monitor I&O     # Malnutrition - S/p PEGJ  - MVI, folic acid, B6, B12 supplements   - TF per nutrition     ------ Chronic stable medical problems ------    # RA- Dx 5/2021 with + CCP ab and RF. Previously treated with rituximab. Rheum consulted early in admission. On steroids as noted above.   # SALTY - Uses CPAP at bedtime prior to admission. Will need to evaluate for BiPAP after decannulation   # HTN- Continue PTA amlodipine 5 mg daily. PRN labetalol and  hydralazine for goal SBP <140   # Anxiety and depression - Continue PTA lexapro 5 mg daily   # Hypothyroidism - TSH 3.17 on 11/19/21. Continue PTA levothyroxine 25 mcg daily     ------ Resolved Hospital problems -------    # Recurrent GIB - hgb drop on 10/22 s/p 2 unit pRBC transfusion with EGD on 10/23 with NJ/OG tube trauma with scant oozing. Patient then developed progressive hypotension and ultimately CODE BLUE for GIB in setting of anticoagulation with heparin drip, s/p MTP. EGD on 11/2 with large amount of clotted blood in stomach and area of raised mucosa with small adherent clot near PEG tube site that was clipped, no active bleeding.  Maroon stools 11/3, repeat EGD with extensive old blood in stomach, no active bleeding, small nodular area with prior clips clipped again.  Most recent EGD 11/5 with ulcer noted at PEG tube bumper site, gastritis, and suction marks from G tube. TF noted in G tube drainage bag 11/7, AXR with GJ tube tip projecting over proximal duodenum. GJ tube exchanged 11/9 by GI.  - PO PPI BID     # Transaminitis - elevated LFTs post-op, thought to be due to shock liver     # BRENNAN - post operative BRENNAN, Cr peaked at 2.05, with renal function back at baseline with Cr at 0.7    # Recurring fevers - Fevers post-op, Tmax 101.7 POD #1.  Febrile with worsening leukocytosis again 10/25, generally persisting.  LP (10/29), xanthochromic with pleocytosis thought to be appropriate given RBC and WBCs, no ABX recommended per transplant ID and neurology.  S/p empiric meropenem 10/28-11/2.  Now afebrile, ABX as above.    # HSV-  Chronic intermittent active infection pre-transplant with recent HSV infection: crusted lesions throughout left side of jaw, s/p 10 day treatment course of ACV through 10/9.  HSV PCR blood negative 10/17.  S/p ACV ppx as above (started POD #1 instead of POD #8 given HSV history and location).         Diet: Adult Formula Drip Feeding: Continuous Pivot 1.5; Jejunostomy; Goal Rate: 65;  "mL/hr; Medication - Feeding Tube Flush Frequency: At least 15-30 mL water before and after medication administration and with tube clogging; 11/22: Once current liter (V...    DVT Prophylaxis: Warfarin   Watson Catheter: Not present  Central Lines: None  Code Status: Full Code      Disposition Plan   Expected discharge: >7 days recommended to TCU vs ARU once adequate pain management/ tolerating PO medications, antibiotic plan established, mental status at baseline, O2 use less than 2 liters/minute and safe disposition plan/ TCU bed available.     The patient's care was discussed with the Attending Physician, Dr. Jesse Matias, Bedside Nurse and Patient.    Mary Jane Garrett PA-C  Hospitalist Service, [facility]  Securely message with the Vocera Web Console (learn more here)  Text page via [employer] Paging/Directory    Please see sign in/sign out for up to date coverage information    Clinically Significant Risk Factors Present on Admission                ______________________________________________________________________    Interval History   Patient reports his breathing is \"Not bad\". Denies any F/C, CP, N/V, abdominal pain, dysuria, or hematuria. Has been having diarrhea with rectal tube in place. Denies any discomfort with trach or PEG.     Data reviewed today: I reviewed all medications, new labs and imaging results over the last 24 hours.     Physical Exam   Vital Signs: Temp: 98  F (36.7  C) Temp src: Oral BP: (!) 133/90 Pulse: 85   Resp: 14 SpO2: 97 % O2 Device: Trach dome Oxygen Delivery: 30 LPM  Weight: 132 lbs 4.42 oz  GENERAL: Alert and oriented to self, place, and time. NAD. Pleasant and conversational.   HEENT: Anicteric sclera. Mucous membranes moist. Trach in placed on trach dome.   CARDIOVASCULAR: RRR. S1, S2. No murmurs, rubs, or gallops.   RESPIRATORY: Effort normal on Trach dome. Clear to anterior auscultation bilaterally, no rales, rhonchi or " wheezes,  respirations unlabored. L sided chest tube in place.   GI: Abdomen soft, non-tender abdomen without rebound or guarding, normoactive bowel sounds present. PEG in place. Rectal tube in place.   EXTREMITIES: No peripheral edema. No calf asymmetry, erythema, or tenderness.   NEUROLOGICAL: CN II-XII grossly intact. Moving all extremities symmetrically.   SKIN: Intact. Warm and dry. No jaundice.     Data   Recent Labs   Lab 11/23/21  1633 11/23/21  1254 11/23/21  0919 11/23/21  0337 11/23/21  0332 11/22/21  1636 11/22/21  1635 11/22/21  0403 11/22/21  0359 11/21/21  0336 11/21/21 0333   WBC  --   --   --   --  11.3*  --   --   --  13.5*  --  12.2*   HGB  --   --   --   --  9.6*  --   --   --  9.5*  --  9.2*   MCV  --   --   --   --  97  --   --   --  98  --  100   PLT  --   --   --   --  79*  --   --   --  81*  --  86*   INR  --   --   --   --  3.41*  --   --   --  3.47*  --  2.95*   NA  --   --   --   --  146*  146*  --  144  --  144  144  --  141  141   POTASSIUM  --   --   --   --  4.5  4.5  --  5.0  --  4.4  4.4  --  3.8  3.8   CHLORIDE  --   --   --   --  112*  112*  --  114*  --  112*  112*  --  106  106   CO2  --   --   --   --  30  30  --  26  --  26  26  --  29  29   BUN  --   --   --   --  56*  56*  --  47*  --  53*  53*  --  55*  55*   CR  --   --   --   --  0.84  0.84  --  0.74  --  0.82  0.82  --  0.86  0.86   ANIONGAP  --   --   --   --  4  4  --  4  --  6  6  --  6  6   ERIK  --   --   --   --  9.1  9.1  --  8.8  --  9.4  9.4  --  9.0  9.0   * 141* 105*   < > 195*  195*   < > 162*   < > 191*  191*   < > 204*  204*   ALBUMIN  --   --   --   --  2.4*  --   --   --  2.3*  --  2.4*   PROTTOTAL  --   --   --   --  6.2*  --   --   --  6.1*  --  6.4*   BILITOTAL  --   --   --   --  0.4  --   --   --  0.2  --  0.2   ALKPHOS  --   --   --   --  132  --   --   --  127  --  133   ALT  --   --   --   --  31  --   --   --  30  --  29   AST  --   --   --   --  25  --   --   --   22  --  20    < > = values in this interval not displayed.     Recent Results (from the past 24 hour(s))   XR Chest Port 1 View    Narrative    EXAM: XR CHEST PORT 1 VIEW  11/23/2021 1:30 AM     HISTORY:  s/p lung transplant       COMPARISON:  Radiographs 11/21/2021. CT 11/22/2021.    TECHNIQUE: AP view the chest at 30 degrees.    FINDINGS:   Devices, lines, tubes: Tracheostomy tube in place. Left upper  extremity PICC with tip projecting over the mid SVC. Clamshell  sternotomy wires are intact. Left basilar chest tube in place.     Postoperative changes of bilateral lung transplantation. The trachea  is midline. The cardiomediastinal silhouette is stable. Trace  bilateral pleural effusions. Stable bibasilar streaky opacities. No  appreciable pneumothorax. No acute osseous abnormality.        Impression    IMPRESSION:   1. Stable trace bilateral pleural effusions.  2. Stable bibasilar opacities, favoring postoperative atelectasis  and/or edema.    I have personally reviewed the examination and initial interpretation  and I agree with the findings.    FITO PERALES MD         SYSTEM ID:  [id number]     Medications     dextrose Stopped (10/24/21 1008)     Warfarin Therapy Reminder         acetylcysteine  2 mL Nebulization BID     amLODIPine  5 mg Per G Tube Daily     calcium carbonate 600 mg-vitamin D 400 units  1 tablet Oral or Feeding Tube BID w/meals     cefTAZidime  2 g Intravenous Q8H     escitalopram  5 mg Oral or Feeding Tube Daily     fluconazole  400 mg Intravenous Q24H     folic acid-vit B6-vit B12  1 tablet Per G Tube Daily     furosemide  40 mg Intravenous BID     heparin lock flush  5-20 mL Intracatheter Q24H     insulin aspart  1-12 Units Subcutaneous Q4H     insulin glargine  30 Units Subcutaneous BID     levalbuterol  1.25 mg Nebulization BID     levofloxacin  750 mg Intravenous Q24H     levothyroxine  25 mcg Oral Daily     lidocaine  2 patch Transdermal Q24H     lidocaine   Transdermal Q8H      melatonin  10 mg Oral QPM     multivitamins w/minerals  15 mL Per Feeding Tube Daily     mycophenolate  1,000 mg Per J Tube BID     nystatin  1,000,000 Units Swish & Swallow 4x Daily     pantoprazole  40 mg Per Feeding Tube BID     predniSONE  10 mg Per J Tube QPM     predniSONE  12.5 mg Per J Tube Daily     QUEtiapine  25 mg Oral or Feeding Tube BID     QUEtiapine  50 mg Oral At Bedtime     rosuvastatin  10 mg Oral or Feeding Tube Daily     sodium chloride (PF)  10-40 mL Intracatheter Q8H     sodium chloride (PF)  3 mL Intracatheter Q8H     sulfamethoxazole-trimethoprim  1 tablet Oral or Feeding Tube Daily     tacrolimus  2 mg Oral QAM     tacrolimus  2.5 mg Oral QPM     warfarin ANTICOAGULANT  0.5 mg Oral ONCE at 18:00

## 2021-11-23 NOTE — PROGRESS NOTES
11/23/21 1000   General Information   Onset of Illness/Injury or Date of Surgery 10/29/21   Referring Physician Tammie Perez MD   Patient/Family Therapy Goal Statement (SLP) Pt did not state   Pertinent History of Current Problem Edson Thornton is a 56 year old male with a PMH significant for NSIP/ILD, bronchiectasis, moderate PH, RA, SALTY, chronic HSV infection, hypogammaglobulinemia, steroid-induced diabetes, hypothyroidism, PFO, HTN, HLD, duodenal anomaly, anxiety, and depression. Admitted on 9/5/21 from OSH for acute on chronic respiratory failure 2/2 ILD exacerbation, now s/p BSLT on 10/16/21.  Prolonged vent wean s/p trach and PEG/J tube placement with thoracic surgery 10/29. Post-op course otherwise complicated by encephalopathy and diffuse weakness, acute to subacute CVA, afib with RVR, BRENNAN, GI bleed, and Candidemia/Candida empyema, and anxiety. Intermittently tolerating PS/TD trials and with ongoing improvement in mentation and strength.  PMSV/cuff deflation completed per MD order.   General Observations Pt awake and alert   Pain Assessment   Patient Currently in Pain No   Type of Evaluation   Type of Evaluation Artificial Airway (Speaking Valve)   Tracheostomy Assessment (Speaking Valve)   Date of Tracheostomy 10/29/21   Type, Tracheostomy Tube Bivona  (original placement of Shiley #6 cuffed)   Tube Size, Tracheostomy 6   Cuff, Tracheostomy Tube cuffed, deflated   Participation Ability (Speaking Valve) awake/alert;attempts to communicate, mouthing words;leak speech used;follows simple commands   Respiratory Status (Speaking Valve)   Oxygen Supply Trach Dome  (30%FiO2, 30 LPM)   Oral/Tracheal Secretions (Speaking Valve)   Oral Secretions (Speaking Valve Assessment) minimal secretions   Tracheal Secretions (Speaking Valve Assessment) minimal secretions   Speaking Valve Trials (Speaking Valve)   Cuff Inflated at Onset of Evaluation No   Oxygen saturation after cuff deflation 95 %   Respiratory rate after  cuff deflation 20 Per Minute   Secretions/Suction, Cuff Deflation (Speaking Valve)   (No suctioning indicated)   Airflow/Phonation Air flow around trach adequate with finger occlusion;Able to phonate with occlusion of trach   Speaking Valve placed on tracheostomy tube;vital signs monitored throughout trials   Oxygen saturation with PMSV placement   (93-94)   Respiratory Rate with PMSV placement 19 Per Minute   Breath Support (Speaking Valve Trial) exhales through mouth;coordinates speech with breath support   Voice Production (Speaking Valve Trial) voicing achieved;good strength/quality;fair ability to change pitch   Cough Production (Speaking Valve Trial)   (pt with polite refusal to cough per pain)   Secretions During Valve Use (Speaking Valve Trial) secretions managed well during valve use;secretions stable during valve use   Outcome of Trial (Speaking Valve) tolerance is good;no change from baseline;speaking valve removed;cuff deflated  (poor endurance)   Total amount of time with PMSV placement: 13 minutes   Recommendations (Speaking Valve Trials) speaking valve use recommended;maintain speaking valve warning labels on cuff inflation line;maintain speaking valve warning labels at bedside;continue speaking valve trials with SLP present   General Therapy Interventions   Planned Therapy Interventions Communication   Communication Speaking valve instruction   SLP Therapy Assessment/Plan   Criteria for Skilled Therapeutic Interventions Met (SLP Eval) yes;treatment indicated   SLP Diagnosis Aphonia s/p tracheostomy   Rehab Potential (SLP Eval) good, to achieve stated therapy goals   Therapy Frequency (SLP Eval) 6 times/wk   Predicted Duration of Therapy Intervention (SLP Eval) 2 weeks   Comment, Therapy Assessment/Plan (SLP) PMSV assessment completed per MD order.  Pt presents with aphonia s/p tracheostomy.  Recommend PMSV with SLP only.  Recommend cuff deflated as tolerated when on HFTD.  Pt awake and alert, seated  upright in chair.  Bivona TTS #6, cuff deflated prior to SLP arrival.  HFTD, 30% FiO2, 30 LPM. Pt able to produce leak speech with reduced vocal intensity and harsh vocal quality.  Pt tolerated finger occlusion to trach SLP with adequate airflow and voicing.  PMSV placed by SLP, pt tolerated for 13 minutes with VSS.  Voicing WFL, pt with decreased ability to vary pitch.  Pt asking to remove valve per fatigue, poor endurance noted.  SLP to follow, suspect pt will require SLP tx at discharge for PMSV management and formal swallow assessment as appropriate pending progress with PMSV.   Therapy Plan Review/Discharge Plan (SLP)   Therapy Plan Review (SLP) care plan/treatment goals reviewed;evaluation/treatment results reviewed;risks/benefits reviewed;current/potential barriers reviewed;participants voiced agreement with care plan;participants included;patient   Demonstrates Need for Referral to Another Service (SLP) clinical nutrition services/dietitian;occupational therapist;physical therapist;respiratory therapist   SLP Discharge Planning    SLP Discharge Recommendation (DC Rec) Transitional Care Facility;Acute Rehab Center-Motivated patient will benefit from intensive, interdisciplinary therapy.  Anticipate will be able to tolerate 3 hours of therapy per day   SLP Rationale for DC Rec Aphonia s/p trach, needs formal swallow assessment.  If pt were to d/c home, would require SLP for PMSV use, and formal swallow assessment by SLP   SLP Brief overview of current status  Recommend PMSV with SLP only.  Recommend cuff deflated as tolerated when on HFTD.  SLP to follow, suspect pt will require SLP tx at discharge for PMSV management and formal swallow assessment as appropriate pending progress with PMSV.    Total Evaluation Time   Total Evaluation Time (Minutes) 16

## 2021-11-23 NOTE — PROGRESS NOTES
Major Shift Events:  Appeared tired through the morning, but participated in PT and Speech. Able to get a good nap in after therapies. Tolerating tube feeds and continent of urine and rectal pouch remains patent. Transferred to  around 1500. Significant other at bedside and updated on POC.    Plan: Continue therapy.   For vital signs and complete assessments, please see documentation flowsheets.

## 2021-11-23 NOTE — PROGRESS NOTES
Care Management Follow Up    Length of Stay (days): 79    Expected Discharge Date:       Concerns to be Addressed:  Discharge planning     Patient plan of care discussed at interdisciplinary rounds: Yes    Anticipated Discharge Disposition:  TCU vs ARU     Additional Information:  Pt is tolerating trach dome and has transfer order to 6B/D.  Per discussion with Pulmonary team, Dr. Mckeon and chart review, pt is off the vent and doesn't need LTAC placement.  Pt will transfer to TCU vs ARU once medically stable.  RNCC will cont to follow the plan of care.      Jennifer Pate RN, PHN, BSN  4A and 4E/ ICU  Care Coordinator  Phone: [phone]  Pager: [phone]    To get in touch with weekend & Holiday on call RN Care Coordinator  Page [phone] or Care Coordinator Job code/pager- [phone]

## 2021-11-24 ENCOUNTER — APPOINTMENT (OUTPATIENT)
Dept: SPEECH THERAPY | Facility: CLINIC | Age: 56
End: 2021-11-24
Attending: STUDENT IN AN ORGANIZED HEALTH CARE EDUCATION/TRAINING PROGRAM
Payer: COMMERCIAL

## 2021-11-24 ENCOUNTER — APPOINTMENT (OUTPATIENT)
Dept: GENERAL RADIOLOGY | Facility: CLINIC | Age: 56
End: 2021-11-24
Attending: STUDENT IN AN ORGANIZED HEALTH CARE EDUCATION/TRAINING PROGRAM
Payer: COMMERCIAL

## 2021-11-24 ENCOUNTER — APPOINTMENT (OUTPATIENT)
Dept: PHYSICAL THERAPY | Facility: CLINIC | Age: 56
End: 2021-11-24
Attending: STUDENT IN AN ORGANIZED HEALTH CARE EDUCATION/TRAINING PROGRAM
Payer: COMMERCIAL

## 2021-11-24 LAB
ALBUMIN SERPL-MCNC: 2.5 G/DL (ref 3.4–5)
ALP SERPL-CCNC: 137 U/L (ref 40–150)
ALT SERPL W P-5'-P-CCNC: 33 U/L (ref 0–70)
ANION GAP SERPL CALCULATED.3IONS-SCNC: 5 MMOL/L (ref 3–14)
AST SERPL W P-5'-P-CCNC: 24 U/L (ref 0–45)
BACTERIA BRONCH: ABNORMAL
BACTERIA BRONCH: ABNORMAL
BILIRUB SERPL-MCNC: 0.2 MG/DL (ref 0.2–1.3)
BUN SERPL-MCNC: 70 MG/DL (ref 7–30)
CALCIUM SERPL-MCNC: 9.1 MG/DL (ref 8.5–10.1)
CALCIUM SERPL-MCNC: 9.5 MG/DL (ref 8.5–10.1)
CALCIUM SERPL-MCNC: 9.5 MG/DL (ref 8.5–10.1)
CHLORIDE BLD-SCNC: 111 MMOL/L (ref 94–109)
CHLORIDE BLD-SCNC: 111 MMOL/L (ref 94–109)
CHLORIDE BLD-SCNC: 112 MMOL/L (ref 94–109)
CO2 SERPL-SCNC: 30 MMOL/L (ref 20–32)
CREAT SERPL-MCNC: 0.84 MG/DL (ref 0.66–1.25)
CREAT SERPL-MCNC: 0.91 MG/DL (ref 0.66–1.25)
CREAT SERPL-MCNC: 0.91 MG/DL (ref 0.66–1.25)
ERYTHROCYTE [DISTWIDTH] IN BLOOD BY AUTOMATED COUNT: 16.5 % (ref 10–15)
GALACTOMANNAN AG BAL QL: NEGATIVE
GALACTOMANNAN AG SERPL QL IA: NEGATIVE
GALACTOMANNAN AG SPEC IA-ACNC: 0.06
GALACTOMANNAN AG SPEC IA-ACNC: 0.09
GFR SERPL CREATININE-BSD FRML MDRD: >90 ML/MIN/1.73M2
GLUCOSE BLD-MCNC: 129 MG/DL (ref 70–99)
GLUCOSE BLD-MCNC: 129 MG/DL (ref 70–99)
GLUCOSE BLD-MCNC: 197 MG/DL (ref 70–99)
GLUCOSE BLDC GLUCOMTR-MCNC: 124 MG/DL (ref 70–99)
GLUCOSE BLDC GLUCOMTR-MCNC: 142 MG/DL (ref 70–99)
GLUCOSE BLDC GLUCOMTR-MCNC: 159 MG/DL (ref 70–99)
GLUCOSE BLDC GLUCOMTR-MCNC: 174 MG/DL (ref 70–99)
GLUCOSE BLDC GLUCOMTR-MCNC: 198 MG/DL (ref 70–99)
GLUCOSE BLDC GLUCOMTR-MCNC: 91 MG/DL (ref 70–99)
GRAM STAIN RESULT: ABNORMAL
GRAM STAIN RESULT: ABNORMAL
HCT VFR BLD AUTO: 33.8 % (ref 40–53)
HGB BLD-MCNC: 10.2 G/DL (ref 13.3–17.7)
INR PPP: 2.76 (ref 0.85–1.15)
MAGNESIUM SERPL-MCNC: 1.9 MG/DL (ref 1.6–2.3)
MCH RBC QN AUTO: 30.3 PG (ref 26.5–33)
MCHC RBC AUTO-ENTMCNC: 30.2 G/DL (ref 31.5–36.5)
MCV RBC AUTO: 100 FL (ref 78–100)
PHOSPHATE SERPL-MCNC: 3 MG/DL (ref 2.5–4.5)
PLATELET # BLD AUTO: 80 10E3/UL (ref 150–450)
POTASSIUM BLD-SCNC: 3.9 MMOL/L (ref 3.4–5.3)
PROT SERPL-MCNC: 6.5 G/DL (ref 6.8–8.8)
RBC # BLD AUTO: 3.37 10E6/UL (ref 4.4–5.9)
SCANNED LAB RESULT: NORMAL
SCANNED LAB RESULT: NORMAL
SODIUM SERPL-SCNC: 146 MMOL/L (ref 133–144)
SODIUM SERPL-SCNC: 146 MMOL/L (ref 133–144)
SODIUM SERPL-SCNC: 147 MMOL/L (ref 133–144)
WBC # BLD AUTO: 12.3 10E3/UL (ref 4–11)

## 2021-11-24 PROCEDURE — 250N000013 HC RX MED GY IP 250 OP 250 PS 637: Performed by: STUDENT IN AN ORGANIZED HEALTH CARE EDUCATION/TRAINING PROGRAM

## 2021-11-24 PROCEDURE — 80053 COMPREHEN METABOLIC PANEL: CPT

## 2021-11-24 PROCEDURE — 250N000011 HC RX IP 250 OP 636: Performed by: STUDENT IN AN ORGANIZED HEALTH CARE EDUCATION/TRAINING PROGRAM

## 2021-11-24 PROCEDURE — 36592 COLLECT BLOOD FROM PICC: CPT

## 2021-11-24 PROCEDURE — 250N000013 HC RX MED GY IP 250 OP 250 PS 637: Performed by: INTERNAL MEDICINE

## 2021-11-24 PROCEDURE — 250N000011 HC RX IP 250 OP 636

## 2021-11-24 PROCEDURE — 999N000157 HC STATISTIC RCP TIME EA 10 MIN

## 2021-11-24 PROCEDURE — 83735 ASSAY OF MAGNESIUM: CPT | Performed by: THORACIC SURGERY (CARDIOTHORACIC VASCULAR SURGERY)

## 2021-11-24 PROCEDURE — 80048 BASIC METABOLIC PNL TOTAL CA: CPT

## 2021-11-24 PROCEDURE — 71045 X-RAY EXAM CHEST 1 VIEW: CPT

## 2021-11-24 PROCEDURE — 120N000003 HC R&B IMCU UMMC

## 2021-11-24 PROCEDURE — 250N000013 HC RX MED GY IP 250 OP 250 PS 637: Performed by: THORACIC SURGERY (CARDIOTHORACIC VASCULAR SURGERY)

## 2021-11-24 PROCEDURE — 250N000011 HC RX IP 250 OP 636: Performed by: ANESTHESIOLOGY

## 2021-11-24 PROCEDURE — 71045 X-RAY EXAM CHEST 1 VIEW: CPT | Mod: 26 | Performed by: RADIOLOGY

## 2021-11-24 PROCEDURE — 250N000012 HC RX MED GY IP 250 OP 636 PS 637: Performed by: INTERNAL MEDICINE

## 2021-11-24 PROCEDURE — 250N000013 HC RX MED GY IP 250 OP 250 PS 637: Performed by: ANESTHESIOLOGY

## 2021-11-24 PROCEDURE — 250N000012 HC RX MED GY IP 250 OP 636 PS 637: Performed by: NURSE PRACTITIONER

## 2021-11-24 PROCEDURE — 99233 SBSQ HOSP IP/OBS HIGH 50: CPT | Mod: 24 | Performed by: INTERNAL MEDICINE

## 2021-11-24 PROCEDURE — 999N000007 HC SITE CHECK

## 2021-11-24 PROCEDURE — 94640 AIRWAY INHALATION TREATMENT: CPT

## 2021-11-24 PROCEDURE — 94640 AIRWAY INHALATION TREATMENT: CPT | Mod: 76

## 2021-11-24 PROCEDURE — 250N000013 HC RX MED GY IP 250 OP 250 PS 637

## 2021-11-24 PROCEDURE — 92507 TX SP LANG VOICE COMM INDIV: CPT | Mod: GN

## 2021-11-24 PROCEDURE — 999N000043 HC STATISTIC CTO2 CONT OXYGEN TECH TIME EA 90 MIN

## 2021-11-24 PROCEDURE — 84100 ASSAY OF PHOSPHORUS: CPT | Performed by: THORACIC SURGERY (CARDIOTHORACIC VASCULAR SURGERY)

## 2021-11-24 PROCEDURE — 97530 THERAPEUTIC ACTIVITIES: CPT | Mod: GP

## 2021-11-24 PROCEDURE — 250N000013 HC RX MED GY IP 250 OP 250 PS 637: Performed by: NURSE PRACTITIONER

## 2021-11-24 PROCEDURE — 85610 PROTHROMBIN TIME: CPT | Performed by: THORACIC SURGERY (CARDIOTHORACIC VASCULAR SURGERY)

## 2021-11-24 PROCEDURE — 99233 SBSQ HOSP IP/OBS HIGH 50: CPT | Performed by: INTERNAL MEDICINE

## 2021-11-24 PROCEDURE — 85027 COMPLETE CBC AUTOMATED: CPT | Performed by: THORACIC SURGERY (CARDIOTHORACIC VASCULAR SURGERY)

## 2021-11-24 PROCEDURE — 250N000009 HC RX 250: Performed by: INTERNAL MEDICINE

## 2021-11-24 PROCEDURE — 250N000012 HC RX MED GY IP 250 OP 636 PS 637: Performed by: PHYSICIAN ASSISTANT

## 2021-11-24 PROCEDURE — 94668 MNPJ CHEST WALL SBSQ: CPT

## 2021-11-24 PROCEDURE — 250N000011 HC RX IP 250 OP 636: Performed by: INTERNAL MEDICINE

## 2021-11-24 PROCEDURE — 250N000011 HC RX IP 250 OP 636: Performed by: PHYSICIAN ASSISTANT

## 2021-11-24 RX ORDER — MAGNESIUM OXIDE 400 MG/1
400 TABLET ORAL 2 TIMES DAILY
Status: DISCONTINUED | OUTPATIENT
Start: 2021-11-24 | End: 2021-11-24

## 2021-11-24 RX ORDER — MAGNESIUM SULFATE HEPTAHYDRATE 40 MG/ML
2 INJECTION, SOLUTION INTRAVENOUS ONCE
Status: COMPLETED | OUTPATIENT
Start: 2021-11-24 | End: 2021-11-24

## 2021-11-24 RX ORDER — WARFARIN SODIUM 2 MG/1
2 TABLET ORAL
Status: COMPLETED | OUTPATIENT
Start: 2021-11-24 | End: 2021-11-24

## 2021-11-24 RX ADMIN — NYSTATIN 1000000 UNITS: 500000 SUSPENSION ORAL at 19:57

## 2021-11-24 RX ADMIN — NYSTATIN 1000000 UNITS: 500000 SUSPENSION ORAL at 11:25

## 2021-11-24 RX ADMIN — ACETYLCYSTEINE 2 ML: 200 SOLUTION ORAL; RESPIRATORY (INHALATION) at 21:05

## 2021-11-24 RX ADMIN — CEFTAZIDIME 2 G: 2 INJECTION, POWDER, FOR SOLUTION INTRAVENOUS at 18:55

## 2021-11-24 RX ADMIN — MYCOPHENOLATE MOFETIL 1000 MG: 200 POWDER, FOR SUSPENSION ORAL at 19:58

## 2021-11-24 RX ADMIN — INSULIN ASPART 2 UNITS: 100 INJECTION, SOLUTION INTRAVENOUS; SUBCUTANEOUS at 16:32

## 2021-11-24 RX ADMIN — ROSUVASTATIN CALCIUM 10 MG: 10 TABLET, FILM COATED ORAL at 08:10

## 2021-11-24 RX ADMIN — QUETIAPINE FUMARATE 25 MG: 25 TABLET ORAL at 08:14

## 2021-11-24 RX ADMIN — INSULIN ASPART 1 UNITS: 100 INJECTION, SOLUTION INTRAVENOUS; SUBCUTANEOUS at 03:28

## 2021-11-24 RX ADMIN — Medication 5 ML: at 15:43

## 2021-11-24 RX ADMIN — TACROLIMUS 2.5 MG: 5 CAPSULE ORAL at 18:26

## 2021-11-24 RX ADMIN — CEFTAZIDIME 2 G: 2 INJECTION, POWDER, FOR SOLUTION INTRAVENOUS at 03:23

## 2021-11-24 RX ADMIN — HYDROXYZINE HYDROCHLORIDE 50 MG: 50 TABLET, FILM COATED ORAL at 19:57

## 2021-11-24 RX ADMIN — CALCIUM CARBONATE 600 MG (1,500 MG)-VITAMIN D3 400 UNIT TABLET 1 TABLET: at 17:12

## 2021-11-24 RX ADMIN — ACETYLCYSTEINE 2 ML: 200 SOLUTION ORAL; RESPIRATORY (INHALATION) at 07:48

## 2021-11-24 RX ADMIN — PREDNISONE 10 MG: 5 TABLET ORAL at 19:58

## 2021-11-24 RX ADMIN — ESCITALOPRAM 5 MG: 5 TABLET, FILM COATED ORAL at 08:02

## 2021-11-24 RX ADMIN — INSULIN ASPART 3 UNITS: 100 INJECTION, SOLUTION INTRAVENOUS; SUBCUTANEOUS at 00:25

## 2021-11-24 RX ADMIN — LEVALBUTEROL HYDROCHLORIDE 1.25 MG: 1.25 SOLUTION RESPIRATORY (INHALATION) at 21:05

## 2021-11-24 RX ADMIN — LABETALOL HYDROCHLORIDE 20 MG: 5 INJECTION, SOLUTION INTRAVENOUS at 04:05

## 2021-11-24 RX ADMIN — NYSTATIN 1000000 UNITS: 500000 SUSPENSION ORAL at 16:07

## 2021-11-24 RX ADMIN — LEVALBUTEROL HYDROCHLORIDE 1.25 MG: 1.25 SOLUTION RESPIRATORY (INHALATION) at 07:48

## 2021-11-24 RX ADMIN — QUETIAPINE FUMARATE 50 MG: 50 TABLET ORAL at 19:58

## 2021-11-24 RX ADMIN — Medication 40 MG: at 19:59

## 2021-11-24 RX ADMIN — FUROSEMIDE 40 MG: 10 INJECTION INTRAMUSCULAR; INTRAVENOUS at 16:07

## 2021-11-24 RX ADMIN — AMLODIPINE BESYLATE 5 MG: 2.5 TABLET ORAL at 08:02

## 2021-11-24 RX ADMIN — MYCOPHENOLATE MOFETIL 1000 MG: 200 POWDER, FOR SUSPENSION ORAL at 11:36

## 2021-11-24 RX ADMIN — INSULIN GLARGINE 30 UNITS: 100 INJECTION, SOLUTION SUBCUTANEOUS at 20:24

## 2021-11-24 RX ADMIN — Medication 10 MG: at 19:58

## 2021-11-24 RX ADMIN — SULFAMETHOXAZOLE AND TRIMETHOPRIM 1 TABLET: 400; 80 TABLET ORAL at 08:11

## 2021-11-24 RX ADMIN — QUETIAPINE FUMARATE 25 MG: 25 TABLET ORAL at 14:30

## 2021-11-24 RX ADMIN — SODIUM CHLORIDE, PRESERVATIVE FREE 5 ML: 5 INJECTION INTRAVENOUS at 06:12

## 2021-11-24 RX ADMIN — CALCIUM CARBONATE 600 MG (1,500 MG)-VITAMIN D3 400 UNIT TABLET 1 TABLET: at 08:02

## 2021-11-24 RX ADMIN — WARFARIN SODIUM 2 MG: 2 TABLET ORAL at 17:15

## 2021-11-24 RX ADMIN — INSULIN GLARGINE 30 UNITS: 100 INJECTION, SOLUTION SUBCUTANEOUS at 07:59

## 2021-11-24 RX ADMIN — Medication 40 MG: at 09:58

## 2021-11-24 RX ADMIN — LEVOTHYROXINE SODIUM 25 MCG: 0.03 TABLET ORAL at 08:14

## 2021-11-24 RX ADMIN — LABETALOL HYDROCHLORIDE 20 MG: 5 INJECTION, SOLUTION INTRAVENOUS at 11:45

## 2021-11-24 RX ADMIN — FLUCONAZOLE IN SODIUM CHLORIDE 400 MG: 2 INJECTION, SOLUTION INTRAVENOUS at 12:25

## 2021-11-24 RX ADMIN — MULTIVIT AND MINERALS-FERROUS GLUCONATE 9 MG IRON/15 ML ORAL LIQUID 15 ML: at 08:00

## 2021-11-24 RX ADMIN — LEVOFLOXACIN 750 MG: 5 INJECTION, SOLUTION INTRAVENOUS at 17:10

## 2021-11-24 RX ADMIN — NYSTATIN 1000000 UNITS: 500000 SUSPENSION ORAL at 08:01

## 2021-11-24 RX ADMIN — SODIUM CHLORIDE, PRESERVATIVE FREE 5 ML: 5 INJECTION INTRAVENOUS at 17:12

## 2021-11-24 RX ADMIN — FUROSEMIDE 40 MG: 10 INJECTION INTRAMUSCULAR; INTRAVENOUS at 08:01

## 2021-11-24 RX ADMIN — PREDNISONE 12.5 MG: 2.5 TABLET ORAL at 08:14

## 2021-11-24 RX ADMIN — INSULIN ASPART 1 UNITS: 100 INJECTION, SOLUTION INTRAVENOUS; SUBCUTANEOUS at 11:25

## 2021-11-24 RX ADMIN — CEFTAZIDIME 2 G: 2 INJECTION, POWDER, FOR SOLUTION INTRAVENOUS at 11:20

## 2021-11-24 RX ADMIN — Medication 1 TABLET: at 08:10

## 2021-11-24 RX ADMIN — MAGNESIUM SULFATE IN WATER 2 G: 40 INJECTION, SOLUTION INTRAVENOUS at 15:39

## 2021-11-24 RX ADMIN — TACROLIMUS 2 MG: 5 CAPSULE ORAL at 11:36

## 2021-11-24 RX ADMIN — HYDROXYZINE HYDROCHLORIDE 50 MG: 50 TABLET, FILM COATED ORAL at 11:35

## 2021-11-24 ASSESSMENT — ACTIVITIES OF DAILY LIVING (ADL)
ADLS_ACUITY_SCORE: 27
ADLS_ACUITY_SCORE: 29
ADLS_ACUITY_SCORE: 27
ADLS_ACUITY_SCORE: 29
ADLS_ACUITY_SCORE: 27
ADLS_ACUITY_SCORE: 29
ADLS_ACUITY_SCORE: 27
ADLS_ACUITY_SCORE: 29
ADLS_ACUITY_SCORE: 27
ADLS_ACUITY_SCORE: 29
ADLS_ACUITY_SCORE: 27
ADLS_ACUITY_SCORE: 27
ADLS_ACUITY_SCORE: 29
ADLS_ACUITY_SCORE: 27
ADLS_ACUITY_SCORE: 29

## 2021-11-24 ASSESSMENT — MIFFLIN-ST. JEOR: SCORE: 1348

## 2021-11-24 NOTE — PROGRESS NOTES
[facility]    Transplant Infectious Diseases Inpatient Progress Note      Edson Thornton MRN# [id number]   YOB: 1965 Age: 56 year old   Date of Admission and time: 9/5/2021  4:42 PM             Recommendations:   1. Continue flucaonzole.   2. Will follow on urinary Histoplasma Ag.   3. It is best to evacuate any remaining right and/or left loculated effusions since they may still represent empyema with C albicans.   - the duration of the antifungals depends on persistent fluid collections and need for chest tube.   4. Continue ceftazdime and levaquin.   5. Will need follow up CT chest around 12/23/2021.   6. Check EKG in 4 days to check QTc after adding levaquin to fluconazole.     Dr. Eastman is available over the Thanksgiving holiday and the weekend for questions. I will resume the patient's care on Monday.         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 S/p Bi lung transplant. Basiliximab for induction, TAC/MMF/prednisone for maintenance.         Active Problems and Infectious Diseases Issues:   1. LLL cavity.   Aspiration- vs pseudmonal- vs K pneumonia-induced abscess.   I am no sure that this new or was obscured by pleural effusion on prior CT.     Will treat with double coverage ABx for the first few weeks given that severe P aeruginosa is known to develop resistance.   Ceftazidime targets both K pneumonia and P aeruginosa. Will also give levaquin (K pneumonia with TESSIE only intermediate to levaquin)    This is less likely to be fungal infection including endemic fungi. Recent Cocci Ag in the urine was negative (resided in the past in endemic areas). Also Histoplasma rarely reactivates (donor and recipient from an endemic areas) and the patient is already on fluconazole which is active against both  Histoplasma and Cocci. Will check urinary Histo to be on the safe side (serum Histo was negative).    CRAG was negative on 10/29/2021.     2. Invasive candidiasis with C albicans.   Left pleural empyema and candidemia.   The candidemia was positive 11/20/2021 and 11/22/2021, first negative blood cx on 11/23/2021.   The left empyema treated with chest tube.     According to last CT chest, still with bilateral loculated effusions R>L. It is best to drain any loculated effusion to better control invasive candidiasis.     Continue fluconazole.   The duration of the antifungals depends on the persistence of the loculated effusion and the persistent need for chest tube.     3. At risk for drug adverse event.   Given fluconazole/levaquin combination; check EKG in 4 days to rule out QTc prolongation.     4. Granuloma on CT chest prior to transplant   This was not seen in the pathology of the explanted lungs also no evidence of mycobacterial or fungal infection of the resected LN.   There would be no further indications to check monthly Cocci Ag in the urine.     5. Diarrhea.   Likely due to MMF and other drugs including ABx.         Old Problems and Infectious Diseases Issues:   1. Significant travel history throughout [place]; was given ivermectin empirically prior to transplant. Serology was negative but was on ritiximab/MMF/high dose steroids when checked.   2. Indeterminate QuantiFERON while on rituximab/MMF/high dose steroids; was in  and traveled to endemic areas but tubeculin skin test was always negative on multiple occasions prior to becoming immunocompromised. Was not deemed to have LTBI.     Other Infectious Disease issues include:  - QTc 394 as of 11/23/21.   - PCP prophylaxis: bactrim.  - Serostatus: CMV D-/R-, EBV D+/R+, HSV1+/2+, VZV +, Toxo -/-. Received ACV ppx until 11/15/2021.    - Immunization status: Too early to discuss. Did not receive the flu, the pneumonia vaccines or the COVID-19 vaccine  -  Gamma globulin status: 198 as of 11/17/2021, received IVIG on 10/21/2021 and 11/18/2021       Attestation:  I interviewed the patient and obtained history from the patient, and by reviewing the patient's chart including outside records, microbiological data, and radiological data. All data are summarized in this notes.  Ming Abebe MD  [facility]  Contact information available via [facility] Paging/Directory    11/24/2021             Interim History:   Still with diarrhea, no N/V or abdominal pain.   Looks depressed.   No fever.          History of Present Illness:   Transplants:  10/16/2021 (Lung), Postoperative day:  39     This patient is a 56 year old male with ILD due to RA was on MMF, rituximab and high dose steroids prior to lung transplant. S/p Bi lung transplant.   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   Also complicated by candidemia with C albicans on 10/202/2021 in the setting of respiratory samples following transplantation with C albicans. Ophthalmology exam was unremarkable.   micafungin was started on 10/22/2021 and switched to fluconazole on 10/26/2021.   The first negative blood cx were from 10/23/2021.   The patient also underwent left chest tube placement.     On repeat CT chest on 10/22/2021, he was found to have LLL cavity. TID were reconsulted.   The patient has no fever. No shortness of breath. The left chest tube in place but has not been draining.       Exposure History in a prior visit:  Was born in [place], lived in many states including [place] [facility] and [facility]. Currently lives in [place] in a house with his girlfriend/fiancee and her son who's fully vaccinated. No pets. He used to work as a  all over the country.   He used to be in the [employer] and was stationed in [place]. He also lived in [place].   He was tested for TB by [facility] on many occasions and was negative.   No significant  outdoors exposure.   Institutionalized for ETOH use in the past.           Review of Systems:      As mentioned in the HPI otherwise negative by reviewing constitutional symptoms, central and peripheral neurological systems, respiratory system, cardiac system, GI system,  system, musculoskeletal, skin, allergy, and lymphatics.                Allergies:   No Known Allergies          Medications:   Medications that Require Transfusion:     dextrose Stopped (10/24/21 1008)     Warfarin Therapy Reminder       Scheduled Medications:     acetylcysteine  2 mL Nebulization BID     amLODIPine  5 mg Per G Tube Daily     calcium carbonate 600 mg-vitamin D 400 units  1 tablet Oral or Feeding Tube BID w/meals     cefTAZidime  2 g Intravenous Q8H     escitalopram  5 mg Oral or Feeding Tube Daily     fluconazole  400 mg Intravenous Q24H     folic acid-vit B6-vit B12  1 tablet Per G Tube Daily     furosemide  40 mg Intravenous BID     heparin lock flush  5-20 mL Intracatheter Q24H     insulin aspart  1-12 Units Subcutaneous Q4H     insulin glargine  30 Units Subcutaneous BID     levalbuterol  1.25 mg Nebulization BID     levofloxacin  750 mg Intravenous Q24H     levothyroxine  25 mcg Oral Daily     lidocaine  2 patch Transdermal Q24H     lidocaine   Transdermal Q8H     magnesium sulfate  2 g Intravenous Once     melatonin  10 mg Oral QPM     multivitamins w/minerals  15 mL Per Feeding Tube Daily     mycophenolate  1,000 mg Per J Tube BID     nystatin  1,000,000 Units Swish & Swallow 4x Daily     pantoprazole  40 mg Per Feeding Tube BID     predniSONE  10 mg Per J Tube QPM     predniSONE  12.5 mg Per J Tube Daily     QUEtiapine  25 mg Oral or Feeding Tube BID     QUEtiapine  50 mg Oral At Bedtime     rosuvastatin  10 mg Oral or Feeding Tube Daily     sodium chloride (PF)  10-40 mL Intracatheter Q8H     sodium chloride (PF)  3 mL Intracatheter Q8H     sulfamethoxazole-trimethoprim  1 tablet Oral or Feeding Tube Daily     tacrolimus   2 mg Oral QAM     tacrolimus  2.5 mg Oral QPM     warfarin ANTICOAGULANT  2 mg Oral ONCE at 18:00               Physical Exam:   Temp: 97.5  F (36.4  C) Temp src: Oral BP: 134/89 Pulse: 90   Resp: 18 SpO2: 97 % O2 Device: Trach dome Oxygen Delivery: 25 LPM    Wt Readings from Last 4 Encounters:   11/24/21 60.7 kg (133 lb 13.1 oz)     Constitutional: awake, alert, cooperative, no apparent distress and appears at stated age, well nourished.   Head, ENT, Eyes, and Neck: Normocephalic, sinuses non-tender to palpation, external ears without lesions, moist buccal mucosa without oral thrush or ulcers, tonsils without swelling, erythema, or exudate, no tenderness palpating teeth, good dentition, gums without necrosis or abscesses.   PERRL, EOMI, pink conjunctivae, non-icteric sclera.   Neck supple without rigidity, no cervical/axillary/inguinal LA bilaterally.    Lungs: CTA bilaterally, no accessory muscle use.   CVS: RRR, normal S1/S2, no murmur, PMI was not displaced.   Abdomen: non-tender, non-distended, no masses, no bruit, no shifting dullness, normal BS.   Extremities: no pitting edema of bilateral lower extremities, no ulcers, normal ROM of all joints, no swelling or erythema of any of joints and no tenderness to palpation.   Skin: no induration, fluctuation or discharge at the clamshell surgical site, and no rash            Data:   No results found for: ACD4    Inflammatory Markers    Recent Labs   Lab Test 10/29/21  0542 10/14/21  0943 10/12/21  1038 10/10/21  1024 10/08/21  0947 10/06/21  1007 10/04/21  0432 10/02/21  0528   SED 87*  --   --   --   --   --   --   --    CRP 53.0* 23.0* 17.0* 30.0* 34.0* 35.0* 23.0* 28.0*       Immune Globulin Studies     Recent Labs   Lab Test 11/17/21  0337 10/15/21  0907 09/07/21  1324 09/07/21  1100   * 265* 363*  363*  --    IGM  --   --  51  --    IGE  --   --   --  83   IGA  --   --  103  --        Metabolic Studies       Recent Labs   Lab Test 11/24/21  3211  11/24/21  1123 11/24/21  0758 11/24/21  0615 11/24/21  0327 11/24/21  0024 11/23/21  1956 11/23/21  1711 11/23/21  0337 11/23/21  0332 11/22/21  1636 11/22/21  1635 11/22/21  0403 11/22/21  0359 11/21/21  0336 11/21/21  0333 11/15/21  0749 11/15/21  0344 11/04/21  1019 11/04/21  1016 11/04/21  0351 11/04/21  0339 10/22/21  1602 10/22/21  1601 10/22/21  0959 10/22/21  0958   NA  --   --   --  146*  146*  --   --   --  147*  --  146*  146*  --  144  --  144  144  --  141  141   < > 140  140   < >  --    < > 148*  148*   < > 147*  --  147*   POTASSIUM  --   --   --  3.9  3.9  --   --   --  4.2  --  4.5  4.5  --  5.0  --  4.4  4.4  --  3.8  3.8   < > 4.6  4.6   < >  --    < > 4.0  4.0   < > 3.6  --  3.5   CHLORIDE  --   --   --  111*  111*  --   --   --  112*  --  112*  112*  --  114*  --  112*  112*  --  106  106   < > 106  106   < >  --    < > 121*  121*   < > 109  --  106   CO2  --   --   --  30  30  --   --   --  30  --  30  30  --  26  --  26  26  --  29  29   < > 31  31   < >  --    < > 23  23   < > 31  --  34*   ANIONGAP  --   --   --  5  5  --   --   --  5  --  4  4  --  4  --  6  6  --  6  6   < > 3  3   < >  --    < > 4  4   < > 7  --  7   BUN  --   --   --  70*  70*  --   --   --  64*  --  56*  56*  --  47*  --  53*  53*  --  55*  55*   < > 37*  37*   < >  --    < > 86*  86*   < > 68*  --  62*   CR  --   --   --  0.91  0.91  --   --   --  0.90  --  0.84  0.84  --  0.74  --  0.82  0.82  --  0.86  0.86   < > 0.47*  0.47*   < >  --    < > 0.88  0.88   < > 1.47*  --  1.54*   GFRESTIMATED  --   --   --  >90  >90  --   --   --  >90  --  >90  >90  --  >90  --  >90  >90  --  >90  >90   < > >90  >90   < >  --    < > >90  >90   < > 53*  --  50*   * 159* 91 129*  129* 142* 198*   < > 122*   < > 195*  195*   < > 162*   < > 191*  191*   < > 204*  204*   < > 191*  191*   < >  --    < > 138*  138*   < > 148*   < > 92   A1C  --   --   --   --   --   --   --   --    --   --   --   --   --   --   --   --   --  5.3  --   --   --   --   --   --   --   --    ERIK  --   --   --  9.5  9.5  --   --   --  9.4  --  9.1  9.1  --  8.8  --  9.4  9.4  --  9.0  9.0   < > 8.7  8.7   < >  --    < > 8.7  8.7   < > 8.2*  --  7.9*   PHOS  --   --   --  3.0  --   --   --   --   --  4.1  --   --   --  4.0  --  4.1   < > 3.6   < >  --   --  3.7   < > 2.8  --   --    MAG  --   --   --  1.9  --   --   --   --   --  2.1  --   --   --  1.9  --  2.7*   < > 1.5*   < >  --   --  2.0   < > 2.1  --   --    LACT  --   --   --   --   --   --   --   --   --   --   --   --   --   --   --   --   --   --   --  0.8  --  1.4   < >  --    < >  --    CKT  --   --   --   --   --   --   --   --   --   --   --   --   --   --   --   --   --   --   --   --   --   --   --  51  --  55    < > = values in this interval not displayed.       Hepatic Studies    Recent Labs   Lab Test 11/24/21  0615 11/23/21  0332 11/22/21  0359 11/21/21  0333 11/20/21  0318 11/19/21  0341 09/30/21  1059 09/19/21  1629   BILITOTAL 0.2 0.4 0.2 0.2 0.2 0.2   < >  --    ALKPHOS 137 132 127 133 111 123   < >  --    ALBUMIN 2.5* 2.4* 2.3* 2.4* 1.8* 1.9*   < >  --    AST 24 25 22 20 13 18   < >  --    ALT 33 31 30 29 23 29   < >  --    LDH  --   --   --   --   --   --   --  423*    < > = values in this interval not displayed.       Pancreatitis testing    Recent Labs   Lab Test 10/22/21  0407 10/21/21  0354 10/20/21  0308 10/19/21  0338 09/07/21  1324 09/07/21  1100   AMYLASE  --   --   --   --   --  32   TRIG 307* 486* 383* 458* 364*  --        Hematology Studies      Recent Labs   Lab Test 11/24/21  0615 11/23/21  0332 11/22/21  0359 11/21/21  0333 11/20/21  0318 11/19/21  0341 11/02/21  1302 11/02/21  1053 11/02/21  0832 11/02/21  0815 11/02/21  0556 11/01/21  0818 10/25/21  0326 10/24/21  0410 10/23/21  0344   WBC 12.3* 11.3* 13.5* 12.2* 11.2* 11.1*   < > 57.4*  57.4*   < > 37.5* 32.6* 28.4*   < > 21.4* 21.3*   ANEU  --   --   --   --   --    --   --  49.9*  --  28.5* 29.7* 25.3*  --  19.9* 20.0*   ALYM  --   --   --   --   --   --   --  4.0  --  4.9 1.3 1.1  --  0.4* 0.2*   DONALD  --   --   --   --   --   --   --  1.7*  --  2.3* 0.7 1.1  --  0.9 1.1   AEOS  --   --   --   --   --   --   --  0.0  --  0.0 0.0 0.0  --  0.0 0.0   HGB 10.2* 9.6* 9.5* 9.2* 7.6* 8.0*   < > 8.8*   < > 5.8* 8.2* 9.6*   < > 9.8* 9.6*   HCT 33.8* 30.5* 30.7* 30.1* 25.2* 25.0*   < > 26.8*   < > 19.6* 27.3* 31.4*   < > 30.5* 30.2*   PLT 80* 79* 81* 86* 78* 100*   < > 244   < > 382 355 308   < > 201 132*    < > = values in this interval not displayed.       Clotting Studies    Recent Labs   Lab Test 11/24/21  0615 11/23/21  0332 11/22/21  0359 11/21/21  0333 11/03/21  0407 11/02/21  1053 11/02/21  0927 11/02/21  0908 11/02/21  0832 10/23/21  0344 10/22/21  1407   INR 2.76* 3.41* 3.47* 2.95*   < > 1.55*  --    < > 1.38*   < > 1.28*   PTT  --   --   --   --   --  34 32  --  50*  --  43*    < > = values in this interval not displayed.       Arterial Blood Gas Testing    Recent Labs   Lab Test 11/22/21  0426 11/22/21  0103 11/21/21  1029 11/20/21  0324 11/14/21  0534 11/11/21  1740 11/07/21  0626 11/06/21  0438 11/05/21  0504 11/05/21  0336 11/04/21  1016   PH  --   --   --   --   --  7.48*  --  7.43 7.49* 7.49* 7.46*   PCO2  --   --   --   --   --  37  --  37 31* 32* 35   PO2  --   --   --   --   --  106*  --  109* 126* 331* 95   HCO3  --   --   --   --   --  28  --  25 24 24 25   O2PER 40 40 40 40   < > 40   < > 40 40 40 40    < > = values in this interval not displayed.        Urine Studies     Recent Labs   Lab Test 11/01/21  1336 10/25/21  1507 10/22/21  1641 10/17/21  1642 10/17/21  1041   URINEPH 5.5 5.5 7.5* 5.0 5.0   NITRITE Negative Negative Negative Negative Negative   LEUKEST Negative Negative Negative Negative Trace*   WBCU 0 2 3 25* 44*       Vancomycin Levels     Recent Labs   Lab Test 10/18/21  0403   VANCOMYCIN 12.6       Tobramycin levels     No lab results  found.    Gentamicin levels    No lab results found.    Tacrolimus levels    Invalid input(s): TACROLIMUS, TAC, TACR  Transplant Immunosuppression Labs Latest Ref Rng & Units 11/24/2021 11/24/2021 11/23/2021 11/23/2021 11/23/2021   Creat 0.66 - 1.25 mg/dL 0.91 0.91 0.90 0.84 0.84   BUN 7 - 30 mg/dL 70(H) 70(H) 64(H) 56(H) 56(H)   WBC 4.0 - 11.0 10e3/uL - 12.3(H) - - 11.3(H)   Neutrophil % - - - - -   ANEU 1.6 - 8.3 10e3/uL - - - - -       Cyclosporine levels    Invalid input(s): CYCLOSPORINE, CYC    Mycophenolate levels    Invalid input(s): MYPA, MYP    Sirolimus levels    Invalid input(s): SIROLIMUS, SIR, RAPA    CSF testing     Recent Labs   Lab Test 10/29/21  1221   CWBC 80*   CNEU 94   CLYM 3   CMONO 4   CRBC 9,000*   CGLU 83*   CTP 97*         Microbiology:  Blood cx reviewed in the HPI and the A/P   Last check of C difficile  C Difficile Toxin B by PCR   Date Value Ref Range Status   11/20/2021 Negative Negative Final     Comment:     A negative result does not exclude actual disease due to C. difficile and may be due to improper collection, handling and storage of the specimen or the number of organisms in the specimen is below the detection limit of the assay.       Virology:  CMV viral loads    CMV DNA IU/mL   Date Value Ref Range Status   11/22/2021 Not Detected Not Detected IU/mL Final   11/16/2021 Not Detected Not Detected IU/mL Final     CMV viral loads    Recent Labs   Lab Test 11/22/21  1446 11/16/21  0357   CMVQNT Not Detected Not Detected       CMV viral loads    CMV DNA IU/mL   Date Value Ref Range Status   11/22/2021 Not Detected Not Detected IU/mL Final   11/16/2021 Not Detected Not Detected IU/mL Final   11/12/2021 Not Detected Not Detected IU/mL Final   10/26/2021 Not Detected Not Detected IU/mL Final       CMV resistance testing  No lab results found.  No results found for: CMVCID, CMVFOS, CMVGAN     No results found for: H6RES    EBV DNA Copies/mL   Date Value Ref Range Status   11/16/2021  Not Detected Not Detected copies/mL Final       CMV Antibody IgG   Date Value Ref Range Status   10/15/2021 No detectable antibody. No detectable antibody.  Final   09/07/2021 No detectable antibody. No detectable antibody.  Final       Toxoplasma Antibody IgG   Date Value Ref Range Status   09/07/2021 <3.0 0.0 - 7.1 IU/mL Final     Comment:     Negative- Absence of detectable Toxoplasma gondii IgG antibodies. A negative result does not rule out acute infection.         Pathology   Native explanted lungs 10/16/2021  A.  LUNG, LEFT NATIVE, PNEUMONECTOMY:  -Nonspecific interstitial pneumonitis (NSIP), predominant fibrotic pattern, with superimposed organizing pneumonia, reactive pneumocyte type II hyperplasia, intra-alveolar clusters of pigmented macrophages, and areas of end-stage fibrosis.  -Three benign hilar lymph nodes.  B.  LUNG, RIGHT NATIVE, PNEUMONECTOMY:  -Nonspecific interstitial pneumonitis (NSIP), predominant fibrotic pattern, with superimposed organizing pneumonia, reactive pneumocyte type II hyperplasia, intra-alveolar clusters of pigmented macrophages, and areas of end-stage fibrosis.  -Four benign hilar lymph nodes.  C.  Lymph node, level 5, excision:  -Fragments of lymph node, negative for malignancy.      Imaging:  CXR 11/24/2021.   Impression:   1. Stable support devices  2. Creasing opacity at the right lung base superimposed on bibasilar  hazy opacity. This suggests increased atelectasis although  consolidation from pneumonia should be considered.    CT chest 11/22/21   IMPRESSION:   1. New cavitary lesion in the left lung base with multifocal  groundglass opacities some of which are new compared to CT 11/2/2021,  notably in the bilateral upper lobes concerning for ongoing infection.  2. Decreased bilateral loculated pleural effusions.  3. New 1.8 cm fluid collection with surrounding fat stranding in the  left axilla.  4. Similar small pericardial effusion.        Ming Abebe MD   Health  [facility]  Contact information available via [facility] Paging/Directory     11/24/2021

## 2021-11-24 NOTE — PLAN OF CARE
Care Provided: 11/23/2021 from [id number]    Temp: 98  F (36.7  C) Temp src: Oral BP: (!) 143/92 Pulse: 86   Resp: 16 SpO2: 98 % O2 Device: Trach dome Oxygen Delivery: 25 LPM    Neuro: A&Ox4. Trached, but nods appropriately.   Cardiac: NSR  Respiratory: Sating >97% on 30% and 30L trach dome. RT turned to 25%. Did have PMV placed today with speech, tolerated well.   GI: BM per rectal tube, placed 11/22/2021 Tolerating tube feed.  : Adequate urine output.   Activity: Lift assist, turn q2hr.   Pain: At acceptable level on current regimen. Denies any pain.   Skin: no new deficits noted.   Incision/Surgical Site 10/16/21 Bilateral Chest (Active)   Incision Assessment WDL 11/23/21 1630   Nisha-Incision Assessment Erythema 11/23/21 0800   Closure Approximated 11/23/21 1630   Incision Drainage Amount None 11/23/21 1630   Dressing Intervention Open to air / No Dressing 11/23/21 1630     Lab:  On replacement protocal  Replacement  LDAs:    - PICC triple, left upper arm, filter lines used for medications  - G/J tube. J tube infusing feedings. G tube to gravity.   - Rectal tube in place      Shift Events: Transferred to  around 1530. No acute events since arrival.   Plan: Will continue to monitor pt closely and notify primary team with any changes.

## 2021-11-24 NOTE — PROGRESS NOTES
Pulmonary Medicine  Cystic Fibrosis - Lung Transplant Team  Progress Note  2021       Patient: Edson Thornton  MRN: [id number]  : 1965 (age 56 year old)  Transplant: 10/16/2021 (Lung), POD#39  Admission date: 2021    Assessment & Plan:    Edson Thornton is a 56 year old male with a PMH significant for NSIP/ILD, bronchiectasis, moderate PH, RA, SALTY, chronic HSV infection, hypogammaglobulinemia, steroid-induced diabetes, hypothyroidism, PFO, HTN, HLD, duodenal anomaly, anxiety, and depression.  Admitted on 21 from OSH for acute on chronic respiratory failure 2/2 ILD exacerbation, now s/p BSLT on 10/16/21.  Prolonged vent wean s/p trach and PEG/J tube placement with thoracic surgery 10/29.  Post-op course otherwise complicated by encephalopathy and diffuse weakness, acute to subacute CVA, afib with RVR, BRENNAN, GI bleed, Candidemia/Candida empyema, and anxiety.  Code called  for bradycardia/asystole, progressive hypotensive, found to have significant GI bleed, EGD with adherent clot near PEG tube site that was clipped.  Tolerating TD trials since  and ongoing improvement in mentation and weakness.       Today's recommendations:  - TD with Cuff down.  - Consult Speech path for swallow evaluation (ordered).  - Diuresis per Primary team.  - Tacrolimus repeat level  therapeutic, no dose adjustment. Repeat level  (ordered).  - Prednisone taper  not ordered.  - Coccidioides Ag on - repeat.  - Continue Zosyn for 2 week course through .  - Follow BAL cultures from 21.  - Continue fluconazole (re-evaluate around  pending repeat fungal pleural cultures), EKG weekly for QTc monitoring (, ordered).  - Rt pleural - thoracentesis (diagnostic/therapeutic).  - Left pleural - Lytics BID for three days.  - Repeat IgG (12/15, ordered).  - DSA monitoring q2 weeks, () ordered.     S/p BSLT for ILD:  Acute hypoxic respiratory failure s/p trach:   Bilateral  pleural effusions: Unfortunately had not received vaccination for flu, PNA, or COVID-19 PTA.  Explant pathology with NSIP, no malignancy.  PGD 2-3.  Weaned off paralytic 10/19 (for vent dyssynchrony) and Caroline 10/22.  Initial difficulty weaning sedation given agitation then with neurological findings as below.  Prolonged vent wean s/p trach and PEG/J tube placement with thoracic surgery 10/29.  Code called 11/2 for bradycardia/asystole (required 1 round of CPR, no medications) then progressively hypotensive, GI bleed as below.  Chest CT 11/2 with increased bilateral pleural effusions (moderate left, small right), bibasilar atelectasis with area of hypoenhancing parenchyma in LLL (suspicious for infection), and numerous nondisplaced anterior rib fractures bilaterally.  S/p left chest tube placement in IR 11/3 for pleural effusion/empyema (as below), right deferred given very little effusion on US.  Problems with trach cuff leak 11/3 (requiring multiple exchanges), bronch 11/14 with trach in good position with cuff well sealed in trachea and small to moderate secretions mostly in lower lobes. Has tolerated continuous TD 40% since 11/20.   - Nebs: levalbuterol and Mucomyst QID  - Pulmonary toilet with chest physiotherapy QID  - TD as tolerated otherwise bipap for support  - Diuresis per ICU team  - CT chest (11/22) New cavitary lesion in the left lung base, and with multifocal bilateral upper lobe GGO (some of which are new), new 1.8 cm fluid collection with surrounding fat stranding in the left axilla, decreased bilateral loculated pleural effusions.  - BAL on 11/22/21, growing Klebsiella pneumonia.  - CXR daily while chest tube in place, will review today's when completed.  11/24/2021: CXR showing Rt base increased opacity.     Immunosuppression: s/p induction therapy with basiliximab 10/16 (and high dose IV steroid) and 10/20  - Tacrolimus 2 mg BID (suspension, via G tube).  Goal level 8-12. Repeat level 11/22  therapeutic at 10.3, no dose adjustment. Repeat level 11/26 (ordered)  - MMF 1000 mg BID (11/2, decreased given GI bleed, AZA to be avoided given TPMT)  - Prednisolone at 12.5 mg qAM, 10 mg qPM, next taper due 12/5 (not ordered)  Date AM dose (mg) PM dose (mg)   11/21/21 12.5 10   12/5/21 10 10   1/2/22 10 7.5   1/30/22 7.5 7.5   2/27/22 7.5 5   3/27/22 5 5   4/24/22 5 2.5      Prophylaxis:   - Bactrim for PJP ppx (held 11/2-11/5 d/t BRENNAN)  - Nystatin for oral candidiasis ppx, 6 month course  - See below for serologies and viral ppx:    Donor Recipient Intervention   CMV status Negative Negative None, CMV monthly (due 12/14, neg 11/16)   EBV status Positive Positive None, EBV monthly (due 12/14, neg 11/16)   HSV status N/A Positive S/p acyclovir POD #1-30 (recent infection history pre-txp)      ID: Concern for possible Strongyloides exposure pre-transplant s/p ivermectin x1 dose (9/17).  Donor and recipient cultures NGTD.  S/p IV ceftazidime/vancomycin for 48h per protocol and additional empiric ceftazidime 10/19-10/23 given recurrent fevers as below.  Cryptococcal Ag negative 10/23.   - Monthly Coccidioides Ag x12 months post-transplant per ID (urine and serum negative 10/17), 11/17 urine and serum neg  - Serum Histoplasma and Blastomyces (11/22) negative.  - Primary team to discuss CT imaging with Transplant ID for recommendations on antibiotic coverage    Recurring fevers, Resolved:  Fevers post-op, Tmax 101.7 POD #1.  Febrile with worsening leukocytosis again 10/25, generally persisting.  LP (10/29), xanthochromic with pleocytosis thought to be appropriate given RBC and WBCs, no ABX recommended per transplant ID and neurology.  S/p empiric meropenem 10/28-11/2.  Now afebrile, ABX as above.     Klebsiella pneumoniae:  Pseudomonas fluorescens/putida: Klebsiella initially noted trach sputum culture 11/10.  Started on ceftriaxone 11/11, transitioned to ertapenem 11/12-11/13, and back to ceftriaxone 11/14.  Bronch  culture 11/12 with Klebsiella and Pseudomonas fluorescens/putida (R-meropenem).     - ABX: Zosyn (11/15 - 11/23/2021) Started on IV ceftaz/leva (11/23 - now).      Disseminated Candida: Noted on blood cultures 10/20 and 10/22.  BDG fungitell positive (399) on 10/20.  Respiratory cultures with persistent Candida albicans.  TC 10/23 without evidence of endocarditis.  Ophthalmology consult 10/24 with benign dilated fundoscopic exam.  Candida empyema also noted 10/25, chest tubes inadvertently removed by CVTS 10/28, left chest tube replaced by IR 11/3 as above with ongoing Candida on cultures.   - BDG fungitell and Aspergillus galactomannan Ag (11/22)  pending  - Repeat pleural fungal cultures from 11/18 is positive. and the fungal/bacterial blood cultures (11/18), NGTD  - Fluconazole (10/26 per transplant ID, prior micafungin 10/22-10/27), repeat EKG for QTc monitoring 11/23 (ordered), LFTs as below (see transplant ID note 11/5).  11/24/2021: We will do thoracentesis on Rt side (Diagnostic/therapeutic) and left side lytics BID x3 days.     HSV: Chronic intermittent active infection pre-transplant with recent HSV infection: crusted lesions throughout left side of jaw, s/p 10 day treatment course of ACV through 10/9.  HSV PCR blood negative 10/17.  S/p ACV ppx as above (started POD #1 instead of POD #8 given HSV history and location).     Hypogammaglobulinemia: IgG previously low at 364 (9/7).  Noted at 265 at time of transplant, s/p IVIG 10/21, repeat IgG (11/17) 198, s/p IVIG (with premedication) 11/18.  - Repeat IgG (12/15, ordered)     Positive cross match: Note that he received two doses of rituximab in June, which is likely contributing to cross match result.  DSA most recently negative 11/15.  - DSA monitoring q2 weeks, (11/29) ordered     SALTY: Noted pre-transplant.  Home CPAP 6-12 cm H2O.  - Evaluate need for BiPAP after decannulation.     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  MRI 10/23 with multifocal subacute infarcts within both cerebral hemispheres and left cerebellum.  DDx include surgery v embolic v infectious.  Heparin drip started 10/23 (intermittently held with GI bleed as below).  Repeat stroke code 10/25 d/t marked decrease in responsiveness with sedation wean, pupil inequality, and absent gag reflex.  CTA head without obvious new pathology, MRI brain (with and without contrast) primarily revealing for infarct, low likelihood of PRES.  Ammonia normal.  VEEG per neuro with severe diffuse encephalopathy.  Gradual improvement noted since 10/29, repeat head CT 11/2 (following code) without new acute intracranial abnormalities.  - AC management per primary team as below  - PT/OT     Afib with RVR: Noted 10/18, started on amiodarone drip and converted to SR, transitioned to PO 10/21, decreased 10/29. Metoprolol and amiodarone discontinued 11/20 d/t intermittent bradycardia. AC per primary team.     Right subclavian DVT: Seen on UE US from 11/4.  Heparin drip resumed 11/5, transitioned to warfarin 11/20 and in the setting of thrombocytopenia. HIT negative (11/21).      Recurrent GI bleed, Resolved: Hemoglobin dropped to 6.6 10/22, s/p 2 units pRBC.  OG tube with bloody output, EGD 10/23 noted NJ/OG tube trauma with scant oozing.  Progressive hypotension 11/2, hemoglobin 5.8 with bloody G tube output.  Heparin drip held, transitioned to PPI drip, and MTP activated.  EGD 11/2 with large amount of clotted blood in stomach and area of raised mucosa with small adherent clot near PEG tube site that was clipped, no active bleeding.  Maroon stools 11/3, repeat EGD with extensive old blood in stomach, no active bleeding, small nodular area with  "prior clips clipped again.  Most recent EGD 11/5 with ulcer noted at PEG tube bumper site, gastritis, and suction marks from G tube. TF noted in G tube drainage bag 11/7, AXR with GJ tube tip projecting over proximal duodenum. GJ tube exchanged 11/9 by GI.      Elevated LFTs: Shock liver post-op, now resolved.  Intermittent alk phos elevation.     Hypomagnesemia: Suppressed reabsorption 2/2 CNI.  Requiring nearly daily IV replacement.  - IV and PO magnesium supplementation      We appreciate the excellent care provided by the CVICU and CVTS teams.  Recommendations communicated via in person rounding and this note.  Will continue to follow along closely, please do not hesitate to call with any questions or concerns.         Subjective & Interval History:     Breathing comfortably on TD 40%, 40L. Minimal cough. Denies pain. Reports more Depression due to overall outcome. No nausea, reflux, or abdominal discomfort. Loose stools continue, improving. CPT BID. Left chest tube remains with poor output.    Sleep fair.    Review of Systems:     ROS as above otherwise limited due to communication barriers    Physical Exam:     Vital signs:  Temp: 97.5  F (36.4  C) Temp src: Oral BP: 134/89 Pulse: 90   Resp: 18 SpO2: 97 % O2 Device: Trach dome Oxygen Delivery: 25 LPM Height: 162.6 cm (5' 4\") Weight: 60.7 kg (133 lb 13.1 oz)  I/O:     Intake/Output Summary (Last 24 hours) at 11/24/2021 1715  Last data filed at 11/24/2021 1600  Gross per 24 hour   Intake 2320 ml   Output 2145 ml   Net 175 ml       Constitutional: lying in bed, in no apparent distress.   HEENT: Eyes with pink conjunctivae, anicteric.  Oral mucosa moist without lesions.  Trache cdi  PULM: Fair air flow bilaterally.  Bibasilar crackles, no rhonchi, no wheezes.  Non-labored breathing on TD.  CV: Normal S1 and S2.  RRR.  No murmur, gallop, or rub.  No peripheral edema.   ABD: NABS, soft, nontender, nondistended.  PEG/J not visualized  MSK: Able to squeeze left > " right hand and move BLE.  + apparent muscle wasting.   NEURO: Alert, follows commands, nonverbal communication by head nod/shake  SKIN: Warm, dry.  No rash on limited exam.   PSYCH: Mood stable.     Lines, Drains, and Devices:  Peripheral IV 11/02/21 Anterior;Right Upper forearm (Active)   Site Assessment WDL 11/22/21 0800   Line Status Saline locked 11/22/21 0800   Dressing Intervention Dressing reinforced 11/08/21 2000   Phlebitis Scale 0-->no symptoms 11/22/21 0800   Infiltration Scale 0 11/22/21 0800   Infiltration Site Treatment Method  None 11/21/21 0400   Number of days: 20       PICC Triple Lumen 11/04/21 Left Basilic Access. PICC okay to use. (Active)   Site Assessment [facility] 11/22/21 0800   External Cath Length (cm) 1 cm 11/04/21 1747   Extremity Circumference (cm) 28 cm 11/21/21 0000   Dressing Intervention Chlorhexidine patch;Transparent 11/22/21 0800   Dressing Change Due 11/28/21 11/22/21 0800   [place]osley - Status saline locked 11/22/21 0800   [place] - Cap Change Due 11/23/21 11/22/21 0800   Red - Status infusing 11/22/21 0800   Red - Cap Change Due 11/23/21 11/22/21 0800   White - Status saline locked 11/22/21 0800   White - Cap Change Due 11/23/21 11/22/21 0800   Extravasation? No 11/17/21 2000   Line Necessity Yes, meets criteria 11/22/21 0800   Number of days: 18     Data:     LABS    CMP:   Recent Labs   Lab 11/24/21  1605 11/24/21  1547 11/24/21  1123 11/24/21  0758 11/24/21  0615 11/23/21  1956 11/23/21  1711 11/23/21  0337 11/23/21  0332 11/22/21  0403 11/22/21  0359 11/21/21  0336 11/21/21  0333   NA  --  147*  --   --  146*  146*  --  147*  --  146*  146*   < > 144  144  --  141  141   POTASSIUM  --  3.9  --   --  3.9  3.9  --  4.2  --  4.5  4.5   < > 4.4  4.4  --  3.8  3.8   CHLORIDE  --  112*  --   --  111*  111*  --  112*  --  112*  112*   < > 112*  112*  --  106  106   CO2  --  30  --   --  30  30  --  30  --  30  30   < > 26  26  --  29  29   ANIONGAP  --  5  --   --  5  5  --  5   --  4  4   < > 6  6  --  6  6   * 197* 159* 91 129*  129*   < > 122*   < > 195*  195*   < > 191*  191*   < > 204*  204*   BUN  --  70*  --   --  70*  70*  --  64*  --  56*  56*   < > 53*  53*  --  55*  55*   CR  --  0.84  --   --  0.91  0.91  --  0.90  --  0.84  0.84   < > 0.82  0.82  --  0.86  0.86   GFRESTIMATED  --  >90  --   --  >90  >90  --  >90  --  >90  >90   < > >90  >90  --  >90  >90   ERIK  --  9.1  --   --  9.5  9.5  --  9.4  --  9.1  9.1   < > 9.4  9.4  --  9.0  9.0   MAG  --   --   --   --  1.9  --   --   --  2.1  --  1.9  --  2.7*   PHOS  --   --   --   --  3.0  --   --   --  4.1  --  4.0  --  4.1   PROTTOTAL  --   --   --   --  6.5*  --   --   --  6.2*  --  6.1*  --  6.4*   ALBUMIN  --   --   --   --  2.5*  --   --   --  2.4*  --  2.3*  --  2.4*   BILITOTAL  --   --   --   --  0.2  --   --   --  0.4  --  0.2  --  0.2   ALKPHOS  --   --   --   --  137  --   --   --  132  --  127  --  133   AST  --   --   --   --  24  --   --   --  25  --  22  --  20   ALT  --   --   --   --  33  --   --   --  31  --  30  --  29    < > = values in this interval not displayed.     CBC:   Recent Labs   Lab 11/24/21  0615 11/23/21 0332 11/22/21  0359 11/21/21 0333   WBC 12.3* 11.3* 13.5* 12.2*   RBC 3.37* 3.15* 3.13* 3.02*   HGB 10.2* 9.6* 9.5* 9.2*   HCT 33.8* 30.5* 30.7* 30.1*    97 98 100   MCH 30.3 30.5 30.4 30.5   MCHC 30.2* 31.5 30.9* 30.6*   RDW 16.5* 16.7* 16.6* 16.4*   PLT 80* 79* 81* 86*       INR:   Recent Labs   Lab 11/24/21  0615 11/23/21  0332 11/22/21  0359 11/21/21  0333   INR 2.76* 3.41* 3.47* 2.95*       Glucose:   Recent Labs   Lab 11/24/21  1605 11/24/21  1547 11/24/21  1123 11/24/21  0758 11/24/21  0615 11/24/21  0327   * 197* 159* 91 129*  129* 142*       Blood Gas:   Recent Labs   Lab 11/22/21  0426 11/22/21  0103 11/21/21  1029   PHV 7.37 7.41 7.38   PCO2V 43 42 49   PO2V 34 36 34   HCO3V 25 27 29*   LORI -0.4 1.9 3.1*   O2PER 40 40 40       Culture Data  No results for input(s): CULT in the last 168 hours.    Virology Data:   Lab Results   Component Value Date    FLUAH1 Negative 11/22/2021    FLUAH3 Negative 11/22/2021    AS2931 Negative 11/22/2021    IFLUB Negative 11/22/2021    RSVA Negative 11/22/2021    RSVB Negative 11/22/2021    PIV1 Negative 11/22/2021    PIV2 Negative 11/22/2021    PIV3 Negative 11/22/2021    HMPV Negative 11/22/2021    HRVS Negative 11/22/2021    ADVBE Negative 11/22/2021    ADVC Negative 11/22/2021    ADVC Negative 11/12/2021    ADVC Negative 10/17/2021       Historical CMV results (last 3 of prior testing):  Lab Results   Component Value Date    CMVQNT Not Detected 11/22/2021    CMVQNT Not Detected 11/16/2021    CMVQNT Not Detected 11/12/2021     No results found for: CMVLOG    Urine Studies    Recent Labs   Lab Test 11/01/21  1336 10/25/21  1507   URINEPH 5.5 5.5   NITRITE Negative Negative   LEUKEST Negative Negative   WBCU 0 2       Most Recent [facility] Pulmonary Function Testing (FVC/FEV1 only)  No results found for: 20002  No results found for: 20003  No results found for: 20015  No results found for: 20016    IMAGING    Recent Results (from the past 48 hour(s))   XR Chest Port 1 View    Narrative    Exam: XR CHEST PORT 1 VIEW, 11/21/2021 4:28 AM    Comparison: Chest x-ray 11/20/2021    History: s/p lung transplant    Findings:  Single portable AP semiupright view of the chest is obtained.  Postoperative changes of bilateral lung transplantation, clamshell  sternotomy wires are intact. Tracheostomy tube tip is in the thoracic  trachea. Left upper extremity PICC tip terminates in the superior  cavoatrial junction. Left basilar chest tube in place.    Trachea is midline. Mediastinum is within normal limits.  Cardiopulmonary silhouette is within normal limits. Trace bilateral  effusions and associated atelectasis. No appreciable pneumothorax. The  upper abdomen is unremarkable.      Impression    Impression:   1. Slightly reduced  bilateral effusions.  2. Reduced left basilar atelectasis.  3. Slightly increased right basilar atelectasis.    I have personally reviewed the examination and initial interpretation  and I agree with the findings.    SHANDRA CEVALLOS MD         SYSTEM ID:  [id number]   CT Chest w/o Contrast    Narrative    EXAMINATION: CT CHEST W/O CONTRAST, 11/22/2021 6:02 AM    CLINICAL HISTORY: Abnormal xray - pleural effusion    COMPARISON: Radiograph 11/21/2021, 11/20/2021. CT 11/2/2021.    TECHNIQUE: CT imaging obtained through the chest without contrast.  Coronal, sagittal and axial MIP reformatted images obtained and  reviewed.     FINDINGS:    Lines, tubes, devices: Tracheostomy in place. Left upper extremity  PICC terminating at the superior cavoatrial junction. Left basilar  chest tube. Median sternotomy wires are intact.    Lungs: Post surgical changes of bilateral lung transplantation. The  central tracheobronchial tree is patent. Peripheral mucous plugging in  the bilateral lower lobes. Multifocal groundglass opacities some of  which are new compared to CT 11/2/2021. Interval development of a  cavitary lesion of the left lung base measuring 2.7 x 2.9 cm (series 6  image 190).  Decreased bilateral loculated pleural effusions with  associated compressive atelectasis.    Mediastinum: The visualized thyroid is unremarkable.Heart size is  enlarged. Small pericardial effusion. Mild coronary artery  calcifications. The thoracic aorta and main pulmonary artery are  within normal limits. Standard branching pattern of the great vessels.  No abnormal thoracic lymph nodes.    Bones and soft tissues: Subacute bilateral rib fractures. 1.2 x 1.8 cm  fluid collection in the left axilla with surrounding fat stranding  (series 2 image 27). No suspicious osseous lesion. Mild degenerative  changes of the thoracolumbar spine.    Upper abdomen:  Limited evaluation of the upper abdomen. No acute  pathology of the visualized solid organs.  Percutaneous  gastrojejunostomy tube.      Impression    IMPRESSION:   1. New cavitary lesion in the left lung base with multifocal  groundglass opacities some of which are new compared to CT 11/2/2021,  notably in the bilateral upper lobes concerning for ongoing infection.  2. Decreased bilateral loculated pleural effusions.  3. New 1.8 cm fluid collection with surrounding fat stranding in the  left axilla.  4. Similar small pericardial effusion.    I have personally reviewed the examination and initial interpretation  and I agree with the findings.    ARIES DELGADO MD         SYSTEM ID:  [id number]

## 2021-11-24 NOTE — PROGRESS NOTES
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 Corral. Prolonged vent wean s/p trach and PEG/J tube placement with thoracic surgery 10/29.  Post-op course otherwise complicated by encephalopathy and diffuse weakness, acute to subacute CVA, afib with RVR, BRENNAN, GI bleed, Candidemia/Candida empyema, and anxiety.  Code called 11/2 for bradycardia/asystole, progressive hypotensive, found to have significant GI bleed, EGD with adherent clot near PEG tube site that was clipped.  Tolerating TD trials since 11/20 and ongoing improvement in mentation and weakness.  Patient has continued to improve and transferred to the floor 11/23.  Medicine team primary, CVTS following for incision and chest tube management.     - Currently being treated with ceftazidime, levaquin and fluconazole per transplant ID.   - All surgical drains have been removed  - Left pigtail placed 11/3 for empyema/pleural effusion  - Keeping pigtail until at least completion of antifungal  - CT chest 11/22 showed new cavitary lesion in the left lung base, and with multifocal bilateral upper lobe GGO (some of which are new), new 1.8 cm fluid collection with surrounding fat stranding in the left axilla, decreased bilateral loculated pleural effusions.  - Other cares per medicine and pulmonary team    D/w Dr. Shayna De La Fuente PA-FRANCA  Cardiothoracic Surgery  p: [phone]          Interval History:   No acute events overnight. States pain is well managed on current regimen. No acute complaints.          Medications:       acetylcysteine  2 mL Nebulization BID     amLODIPine  5 mg Per G Tube Daily     calcium carbonate 600 mg-vitamin D 400 units  1 tablet Oral or Feeding Tube BID w/meals     cefTAZidime  2 g Intravenous Q8H     escitalopram  " 5 mg Oral or Feeding Tube Daily     fluconazole  400 mg Intravenous Q24H     folic acid-vit B6-vit B12  1 tablet Per G Tube Daily     furosemide  40 mg Intravenous BID     heparin lock flush  5-20 mL Intracatheter Q24H     insulin aspart  1-12 Units Subcutaneous Q4H     insulin glargine  30 Units Subcutaneous BID     levalbuterol  1.25 mg Nebulization BID     levofloxacin  750 mg Intravenous Q24H     levothyroxine  25 mcg Oral Daily     lidocaine  2 patch Transdermal Q24H     lidocaine   Transdermal Q8H     melatonin  10 mg Oral QPM     multivitamins w/minerals  15 mL Per Feeding Tube Daily     mycophenolate  1,000 mg Per J Tube BID     nystatin  1,000,000 Units Swish & Swallow 4x Daily     pantoprazole  40 mg Per Feeding Tube BID     predniSONE  10 mg Per J Tube QPM     predniSONE  12.5 mg Per J Tube Daily     QUEtiapine  25 mg Oral or Feeding Tube BID     QUEtiapine  50 mg Oral At Bedtime     rosuvastatin  10 mg Oral or Feeding Tube Daily     sodium chloride (PF)  10-40 mL Intracatheter Q8H     sodium chloride (PF)  3 mL Intracatheter Q8H     sulfamethoxazole-trimethoprim  1 tablet Oral or Feeding Tube Daily     tacrolimus  2 mg Oral QAM     tacrolimus  2.5 mg Oral QPM     acetaminophen, albuterol, bisacodyl, dextrose, glucose **OR** dextrose **OR** glucagon, heparin lock flush, hydrALAZINE, hydrOXYzine **OR** hydrOXYzine, labetalol, lidocaine, magnesium hydroxide, naloxone **OR** naloxone **OR** naloxone **OR** naloxone, ondansetron **OR** ondansetron, oxyCODONE, oxymetazoline, prochlorperazine **OR** prochlorperazine, QUEtiapine, senna-docusate, sodium chloride (PF), sodium chloride (PF), Warfarin Therapy Reminder          Physical Exam:   Vitals were reviewed  Blood pressure (!) 143/90, pulse 89, temperature 98  F (36.7  C), resp. rate 16, height 1.626 m (5' 4\"), weight 60.7 kg (133 lb 13.1 oz), SpO2 90 %.  Vitals:    11/22/21 0000 11/23/21 0400 11/24/21 0520   Weight: 67.7 kg (149 lb 4.8 oz) 60 kg (132 lb 4.4 " oz) 60.7 kg (133 lb 13.1 oz)      I/O last 3 completed shifts:  In: 2695 [I.V.:545; NG/GT:720]  Out: 2390 [Urine:1875; Emesis/NG output:115; Stool:400]    Gen: A&Ox4, NAD  Pulm:  Bibasilar crackles, no rhonchi, no wheezes. Non-labored breathing on TD.  CV: Normal S1 and S2.  RRR.  No murmur, gallop, or rub.  No peripheral edema.   Abd: NABS, soft, nontender, nondistended.   Tubes/drains: Dressing clean and dry, minimal output serosanguinous output, no air leak.          Data:    All labs and imaging reviewed by me.  Labs:    Data   BMP  Recent Labs   Lab 11/24/21  1123 11/24/21  0758 11/24/21  0615 11/24/21  0327 11/23/21  1956 11/23/21  1711 11/23/21  0337 11/23/21  0332 11/22/21  1636 11/22/21  1635   NA  --   --  146*  146*  --   --  147*  --  146*  146*  --  144   POTASSIUM  --   --  3.9  3.9  --   --  4.2  --  4.5  4.5  --  5.0   CHLORIDE  --   --  111*  111*  --   --  112*  --  112*  112*  --  114*   ERIK  --   --  9.5  9.5  --   --  9.4  --  9.1  9.1  --  8.8   CO2  --   --  30  30  --   --  30  --  30  30  --  26   BUN  --   --  70*  70*  --   --  64*  --  56*  56*  --  47*   CR  --   --  0.91  0.91  --   --  0.90  --  0.84  0.84  --  0.74   * 91 129*  129* 142*   < > 122*   < > 195*  195*   < > 162*    < > = values in this interval not displayed.     CBC  Recent Labs   Lab 11/24/21  0615 11/23/21  0332 11/22/21  0359 11/21/21  0333   WBC 12.3* 11.3* 13.5* 12.2*   RBC 3.37* 3.15* 3.13* 3.02*   HGB 10.2* 9.6* 9.5* 9.2*   HCT 33.8* 30.5* 30.7* 30.1*    97 98 100   MCH 30.3 30.5 30.4 30.5   MCHC 30.2* 31.5 30.9* 30.6*   RDW 16.5* 16.7* 16.6* 16.4*   PLT 80* 79* 81* 86*     INR  Recent Labs   Lab 11/24/21  0615 11/23/21  0332 11/22/21  0359 11/21/21  0333   INR 2.76* 3.41* 3.47* 2.95*      Hepatic Panel   Recent Labs   Lab 11/24/21  0615 11/23/21  0332 11/22/21  0359 11/21/21  0333   AST 24 25 22 20   ALT 33 31 30 29   ALKPHOS 137 132 127 133   BILITOTAL 0.2 0.4 0.2 0.2   ALBUMIN  2.5* 2.4* 2.3* 2.4*     Glucose  Recent Labs   Lab 11/24/21  1123 11/24/21  0758 11/24/21  0615 11/24/21  0327 11/24/21  0024 11/23/21  1956   * 91 129*  129* 142* 198* 148*       Imaging:  Recent Results (from the past 24 hour(s))   XR Chest Port 1 View    Narrative    Exam: XR CHEST PORT 1 VIEW, 11/24/2021 7:54 AM    Indication: s/p lung transplant    Comparison: 11/23/2021    Findings:   Ostomy catheter and left basilar chest tube are unchanged in position.  Mild cardiomegaly. Pulmonary vasculature obscured by mixed opacities  throughout both lungs. When compared to the prior study this appears  slightly increased in the right lower lobe. Priors clamshell  sternotomy.      Impression    Impression:   1. Stable support devices  2. Creasing opacity at the right lung base superimposed on bibasilar  hazy opacity. This suggests increased atelectasis although  consolidation from pneumonia should be considered.    MAURICIO NAVAS MD         SYSTEM ID:  [id number]

## 2021-11-24 NOTE — PROGRESS NOTES
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 Post-op course complicated encephalopathy, and diffuse weakness, acute to subacute by CVA, afib with RVR, BRENNAN, candidemia/candida empyema, and Code Blue due to bradycardia/asystole and hypotension for significant GIB s/p EGD with adherent clot near PEG tube site s/p clips. Transferred from ICU to medicine on 11/23/2021.     Plan for today:  Continue antibiotics  Appreciate input from pulm with chest tube management.   Pulmonary toilet  Clinical psychology consult if patient wishes  PT/OT     # ILD and NSIP s/p BSLT on 10/16/2021, POD 38  # Acute hypoxic respiratory failure s/p tracheostomy on 10/29  # Bilateral pleural effusions   - Transplant pulmonology following, appreciate recs   - IS: s/p basiliximab on 10/16 and 10/20. Continue tacrolimus 2 mg QAM and 2.5 mg QPM (goal 8-12), MMF 1000 mg BID, and prednisone 12.5 mg QAM and 10 mg QPM  - Ppx: Continue bactrim 400-80 mg daily, nystatin QID x6 months  - Monthly Coccidioides Ag x12 months post-transplant per ID (urine and serum negative 11/17)   - DSA every 2 weeks   - Levalbuterol and mucomyst QID and pulm toilet and chest PT QID  - O2 via trach dome as tolerated otherwise BiPAP for support   - Daily CXR   - Diuresis with IV lasix 40 mg BID  - Oxycodone 5 mg Q4H PRN    - PT/OT/SLP      # L lung cavitary lesion - suspect aspiration vs pseudomonal vs K pneumonia induced abscess. CT on 10/25 with LLL nodular opacity. Repeat CT  chest on 11/22 with new cavitary lesion in the left lung base, and with multifocal bilateral upper lobe GGO (some of which are new), new 1.8 cm fluid collection with surrounding fat stranding in the left axilla, decreased bilateral loculated pleural effusions. Indeterminate Quant Gold, but negative tuberculin skin tests on mulitple occurences. S/p BAL on 11/22. ID feels less likely fungal.   - Transplant ID following   - Follow up histo and blastomyces serum ab, BDG fungitell and aspergillus Ag (11/22)   - Follow up BAL studies   - ID recommends stopping zosyn (11/15-11/23), start Ceftazdime 2 grams Q8H and levaquin 750 mg daily    - Follow up CT chest in 4 weeks     # Klebsiella pneumoniae and Pseudomonas fluorescens/putida HAP- Klebsiella initially noted trach sputum culture 11/10. Bronch culture 11/12 with Klebsiella and Pseudomonas fluorescens/putida (R-meropenem).     - Abx as above.       # Disseminated Candida - + candida BCx 10/20 and 10/22.  BDG fungitell positive (399) on 10/20. Sputum Cx + Candida albicans persistently.  TC 10/23 without evidence of endocarditis. Ophthalmology consult 10/24 with benign dilated fundoscopic exam.  Candida empyema also noted 10/25, chest tubes inadvertently removed by CVTS 10/28, left chest tube replaced by IR 11/3 as above with ongoing Candida on cultures. Repeat pleural fungal cultures and fungal/bacterial blood cultures on 11/18 NGTD. Transplant as noted above following   - L sided chest tube in place  - Follow up BDG fungitell and Aspergillus galactomannan Ag (11/22)   - Initially on Micafungin (10/22-10/27) transition to fluconazole 400 mg IV daily (start date 10/26), with plans to re-evaluate on 11/24  - EKG for QTc monitoring (ordered for 11/27)   - ID recommends evacuate any remaining R and/or left loculated effusions      # Hypogammaglobinemia - s/p IVIG with plan to repeat IgG on 12/15     # Encephalopathy - likely multi-factorial. Multiple brain imaging with  stroke as noted below, but negative for PRESS. Ammonia negative. vEEG with severe diffuse encephalopathy. LP as noted below negative for infection. Neurology previously following.   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 MRI brain on 10/23 with multifocal subacute infarct within both cerebral hemispheres and left cerebellum. Presumed embolic with afib hx.   - Anticoagulation as noted below      # Afib with hx of RVR - [id number] for HTN, CVA, and DM2. First noted on 10/18, started on amiodarone drip, and converted to NSR. Metoprolol and amidoarone discontinued on 11/20 d/t intermittent bradycardia.   - Anticoagulation with warfarin as noted below  - Continue Rosuvastatin 10 mg daily      # R subclavian DVT - dx on 11/4. Resumed on heparin drip on 11/5 and transitioned to warfarin in setting of thrombocytopenia. HIT panel negative on 11/21.   - Continue warfarin per pharmacy protocol     # DM2 with steroid induced hyperglycemia - Hemoglobin A1c 6.6 pre-operatively. Endocrine recently consulted for steroids induced hyperglycemia.   - Continue Lantus 30 mg BID  - Novolog High Sliding scale insulin Q4H  - BG Q4H      # Hypernatremia - Na 146. Currently receiving 100 mL of free water via TF Q4H. Trend BMP      # Hypomagnesemia - Likely 2/2 suppressed reabsorption from CNI.   - Sliding scale replacement protocol       # Diarrhea - C. Diff negative on 11/20. Rectal tube in place on 11/22. Possibly due to tube feeds or magnesium supplementation.   - Monitor I&O      # Malnutrition - S/p PEGJ  - MVI, folic acid, B6, B12 supplements   - TF per nutrition      ------ Chronic stable medical problems ------     # RA- Dx 5/2021 with + CCP ab and RF. Previously treated with rituximab. Rheum consulted early in admission. On steroids as noted above.   #  SALTY - Uses CPAP at bedtime prior to admission. Will need to evaluate for BiPAP after decannulation   # HTN- Continue PTA amlodipine 5 mg daily. PRN labetalol and hydralazine for goal SBP <140   # Anxiety and depression - Continue PTA lexapro 5 mg daily   # Hypothyroidism - TSH 3.17 on 11/19/21. Continue PTA levothyroxine 25 mcg daily      ------ Resolved Hospital problems -------     # Recurrent GIB - hgb drop on 10/22 s/p 2 unit pRBC transfusion with EGD on 10/23 with NJ/OG tube trauma with scant oozing. Patient then developed progressive hypotension and ultimately CODE BLUE for GIB in setting of anticoagulation with heparin drip, s/p MTP. EGD on 11/2 with large amount of clotted blood in stomach and area of raised mucosa with small adherent clot near PEG tube site that was clipped, no active bleeding.  Maroon stools 11/3, repeat EGD with extensive old blood in stomach, no active bleeding, small nodular area with prior clips clipped again.  Most recent EGD 11/5 with ulcer noted at PEG tube bumper site, gastritis, and suction marks from G tube. TF noted in G tube drainage bag 11/7, AXR with GJ tube tip projecting over proximal duodenum. GJ tube exchanged 11/9 by GI.  - PO PPI BID      # Transaminitis - elevated LFTs post-op, thought to be due to shock liver      # BRENNAN - post operative BRENNAN, Cr peaked at 2.05, with renal function back at baseline with Cr at 0.7     # Recurring fevers - Fevers post-op, Tmax 101.7 POD #1.  Febrile with worsening leukocytosis again 10/25, generally persisting.  LP (10/29), xanthochromic with pleocytosis thought to be appropriate given RBC and WBCs, no ABX recommended per transplant ID and neurology.  S/p empiric meropenem 10/28-11/2.  Now afebrile, ABX as above.     # HSV-  Chronic intermittent active infection pre-transplant with recent HSV infection: crusted lesions throughout left side of jaw, s/p 10 day treatment course of ACV through 10/9.  HSV PCR blood negative 10/17.  S/p ACV ppx  as above (started POD #1 instead of POD #8 given HSV history and location).                Diet: Adult Formula Drip Feeding: Continuous Pivot 1.5; Jejunostomy; Goal Rate: 65; mL/hr; Medication - Feeding Tube Flush Frequency: At least 15-30 mL water before and after medication administration and with tube clogging; 11/22: Once current liter (V...    DVT Prophylaxis: Heparin SQ  Watson Catheter: Not present  Central Lines: None  Code Status: Full Code      Disposition Plan   Expected discharge:  >7 days recommended to transitional care unit once antibiotic plan established.     The patient's care was discussed with the Bedside Nurse, Patient and Pulm Consultant.    Vincent Koo MD  Hospitalist Service  [facility]  Securely message with the Vocera Web Console (learn more here)  Text page via [employer] Paging/Directory    Please see sign in/sign out for up to date coverage information    Clinically Significant Risk Factors Present on Admission                ______________________________________________________________________    Interval History   Patient seen and examined.  Overall feels okay, his chest discomfort and dyspnea are stable.  Denies, HA, dizziness, N/V, abdominal pain or other symptoms.     Data reviewed today: I reviewed all medications, new labs and imaging results over the last 24 hours. I personally reviewed no images or EKG's today.    Physical Exam   Vital Signs: Temp: 97.5  F (36.4  C) Temp src: Oral BP: 134/89 Pulse: 90   Resp: 18 SpO2: 97 % O2 Device: Trach dome Oxygen Delivery: 25 LPM  Weight: 133 lbs 13.11 oz  General Appearance: NAD  Respiratory: coarse throughout, transmitted upper airway noises, diminished at the bases  Cardiovascular: RRR S1/S2, no m,r,g  GI: soft, NT, ND,   Skin: no rashes  Other: No edema     Data   Recent Labs   Lab 11/24/21  1605 11/24/21  1547 11/24/21  1123 11/24/21  0758 11/24/21  0615 11/23/21  1956 11/23/21  1711  11/23/21  0337 11/23/21  0332 11/22/21  0403 11/22/21  0359   WBC  --   --   --   --  12.3*  --   --   --  11.3*  --  13.5*   HGB  --   --   --   --  10.2*  --   --   --  9.6*  --  9.5*   MCV  --   --   --   --  100  --   --   --  97  --  98   PLT  --   --   --   --  80*  --   --   --  79*  --  81*   INR  --   --   --   --  2.76*  --   --   --  3.41*  --  3.47*   NA  --  147*  --   --  146*  146*  --  147*  --  146*  146*   < > 144  144   POTASSIUM  --  3.9  --   --  3.9  3.9  --  4.2  --  4.5  4.5   < > 4.4  4.4   CHLORIDE  --  112*  --   --  111*  111*  --  112*  --  112*  112*   < > 112*  112*   CO2  --  30  --   --  30  30  --  30  --  30  30   < > 26  26   BUN  --  70*  --   --  70*  70*  --  64*  --  56*  56*   < > 53*  53*   CR  --  0.84  --   --  0.91  0.91  --  0.90  --  0.84  0.84   < > 0.82  0.82   ANIONGAP  --  5  --   --  5  5  --  5  --  4  4   < > 6  6   ERIK  --  9.1  --   --  9.5  9.5  --  9.4  --  9.1  9.1   < > 9.4  9.4   * 197* 159*   < > 129*  129*   < > 122*   < > 195*  195*   < > 191*  191*   ALBUMIN  --   --   --   --  2.5*  --   --   --  2.4*  --  2.3*   PROTTOTAL  --   --   --   --  6.5*  --   --   --  6.2*  --  6.1*   BILITOTAL  --   --   --   --  0.2  --   --   --  0.4  --  0.2   ALKPHOS  --   --   --   --  137  --   --   --  132  --  127   ALT  --   --   --   --  33  --   --   --  31  --  30   AST  --   --   --   --  24  --   --   --  25  --  22    < > = values in this interval not displayed.     Recent Results (from the past 24 hour(s))   XR Chest Port 1 View    Narrative    Exam: XR CHEST PORT 1 VIEW, 11/24/2021 7:54 AM    Indication: s/p lung transplant    Comparison: 11/23/2021    Findings:   Ostomy catheter and left basilar chest tube are unchanged in position.  Mild cardiomegaly. Pulmonary vasculature obscured by mixed opacities  throughout both lungs. When compared to the prior study this appears  slightly increased in the right lower lobe. Priors  clamshell  sternotomy.      Impression    Impression:   1. Stable support devices  2. Creasing opacity at the right lung base superimposed on bibasilar  hazy opacity. This suggests increased atelectasis although  consolidation from pneumonia should be considered.    MAURICIO NAVAS MD         SYSTEM ID:  [id number]     Medications     dextrose Stopped (10/24/21 1008)     Warfarin Therapy Reminder         acetylcysteine  2 mL Nebulization BID     amLODIPine  5 mg Per G Tube Daily     calcium carbonate 600 mg-vitamin D 400 units  1 tablet Oral or Feeding Tube BID w/meals     cefTAZidime  2 g Intravenous Q8H     escitalopram  5 mg Oral or Feeding Tube Daily     fluconazole  400 mg Intravenous Q24H     folic acid-vit B6-vit B12  1 tablet Per G Tube Daily     furosemide  40 mg Intravenous BID     heparin lock flush  5-20 mL Intracatheter Q24H     insulin aspart  1-12 Units Subcutaneous Q4H     insulin glargine  30 Units Subcutaneous BID     levalbuterol  1.25 mg Nebulization BID     levofloxacin  750 mg Intravenous Q24H     levothyroxine  25 mcg Oral Daily     lidocaine  2 patch Transdermal Q24H     lidocaine   Transdermal Q8H     melatonin  10 mg Oral QPM     multivitamins w/minerals  15 mL Per Feeding Tube Daily     mycophenolate  1,000 mg Per J Tube BID     nystatin  1,000,000 Units Swish & Swallow 4x Daily     pantoprazole  40 mg Per Feeding Tube BID     predniSONE  10 mg Per J Tube QPM     predniSONE  12.5 mg Per J Tube Daily     QUEtiapine  25 mg Oral or Feeding Tube BID     QUEtiapine  50 mg Oral At Bedtime     rosuvastatin  10 mg Oral or Feeding Tube Daily     sodium chloride (PF)  10-40 mL Intracatheter Q8H     sodium chloride (PF)  3 mL Intracatheter Q8H     sulfamethoxazole-trimethoprim  1 tablet Oral or Feeding Tube Daily     tacrolimus  2 mg Oral QAM     tacrolimus  2.5 mg Oral QPM     warfarin ANTICOAGULANT  2 mg Oral ONCE at 18:00

## 2021-11-25 ENCOUNTER — APPOINTMENT (OUTPATIENT)
Dept: GENERAL RADIOLOGY | Facility: CLINIC | Age: 56
End: 2021-11-25
Attending: PHYSICIAN ASSISTANT
Payer: COMMERCIAL

## 2021-11-25 ENCOUNTER — APPOINTMENT (OUTPATIENT)
Dept: SPEECH THERAPY | Facility: CLINIC | Age: 56
End: 2021-11-25
Attending: INTERNAL MEDICINE
Payer: COMMERCIAL

## 2021-11-25 LAB
ALBUMIN SERPL-MCNC: 2.4 G/DL (ref 3.4–5)
ALP SERPL-CCNC: 137 U/L (ref 40–150)
ALT SERPL W P-5'-P-CCNC: 32 U/L (ref 0–70)
ANION GAP SERPL CALCULATED.3IONS-SCNC: 6 MMOL/L (ref 3–14)
ANION GAP SERPL CALCULATED.3IONS-SCNC: 8 MMOL/L (ref 3–14)
ANION GAP SERPL CALCULATED.3IONS-SCNC: 8 MMOL/L (ref 3–14)
AST SERPL W P-5'-P-CCNC: 27 U/L (ref 0–45)
BILIRUB SERPL-MCNC: 0.2 MG/DL (ref 0.2–1.3)
BUN SERPL-MCNC: 65 MG/DL (ref 7–30)
BUN SERPL-MCNC: 67 MG/DL (ref 7–30)
BUN SERPL-MCNC: 67 MG/DL (ref 7–30)
CALCIUM SERPL-MCNC: 9.1 MG/DL (ref 8.5–10.1)
CALCIUM SERPL-MCNC: 9.1 MG/DL (ref 8.5–10.1)
CALCIUM SERPL-MCNC: 9.3 MG/DL (ref 8.5–10.1)
CHLORIDE BLD-SCNC: 108 MMOL/L (ref 94–109)
CO2 SERPL-SCNC: 28 MMOL/L (ref 20–32)
CO2 SERPL-SCNC: 28 MMOL/L (ref 20–32)
CO2 SERPL-SCNC: 29 MMOL/L (ref 20–32)
CREAT SERPL-MCNC: 0.82 MG/DL (ref 0.66–1.25)
CREAT SERPL-MCNC: 0.82 MG/DL (ref 0.66–1.25)
CREAT SERPL-MCNC: 0.87 MG/DL (ref 0.66–1.25)
ERYTHROCYTE [DISTWIDTH] IN BLOOD BY AUTOMATED COUNT: 16.5 % (ref 10–15)
GFR SERPL CREATININE-BSD FRML MDRD: >90 ML/MIN/1.73M2
GLUCOSE BLD-MCNC: 137 MG/DL (ref 70–99)
GLUCOSE BLD-MCNC: 190 MG/DL (ref 70–99)
GLUCOSE BLD-MCNC: 190 MG/DL (ref 70–99)
GLUCOSE BLDC GLUCOMTR-MCNC: 103 MG/DL (ref 70–99)
GLUCOSE BLDC GLUCOMTR-MCNC: 107 MG/DL (ref 70–99)
GLUCOSE BLDC GLUCOMTR-MCNC: 114 MG/DL (ref 70–99)
GLUCOSE BLDC GLUCOMTR-MCNC: 167 MG/DL (ref 70–99)
GLUCOSE BLDC GLUCOMTR-MCNC: 174 MG/DL (ref 70–99)
GLUCOSE BLDC GLUCOMTR-MCNC: 194 MG/DL (ref 70–99)
HCT VFR BLD AUTO: 33.2 % (ref 40–53)
HGB BLD-MCNC: 10.1 G/DL (ref 13.3–17.7)
INR PPP: 2.48 (ref 0.85–1.15)
MAGNESIUM SERPL-MCNC: 2.1 MG/DL (ref 1.6–2.3)
MCH RBC QN AUTO: 30.5 PG (ref 26.5–33)
MCHC RBC AUTO-ENTMCNC: 30.4 G/DL (ref 31.5–36.5)
MCV RBC AUTO: 100 FL (ref 78–100)
PHOSPHATE SERPL-MCNC: 2.9 MG/DL (ref 2.5–4.5)
PLATELET # BLD AUTO: 66 10E3/UL (ref 150–450)
POTASSIUM BLD-SCNC: 3.8 MMOL/L (ref 3.4–5.3)
POTASSIUM BLD-SCNC: 4 MMOL/L (ref 3.4–5.3)
POTASSIUM BLD-SCNC: 4 MMOL/L (ref 3.4–5.3)
PROT SERPL-MCNC: 6.4 G/DL (ref 6.8–8.8)
RBC # BLD AUTO: 3.31 10E6/UL (ref 4.4–5.9)
SODIUM SERPL-SCNC: 143 MMOL/L (ref 133–144)
SODIUM SERPL-SCNC: 144 MMOL/L (ref 133–144)
SODIUM SERPL-SCNC: 144 MMOL/L (ref 133–144)
WBC # BLD AUTO: 11.9 10E3/UL (ref 4–11)

## 2021-11-25 PROCEDURE — 80048 BASIC METABOLIC PNL TOTAL CA: CPT

## 2021-11-25 PROCEDURE — 250N000013 HC RX MED GY IP 250 OP 250 PS 637: Performed by: STUDENT IN AN ORGANIZED HEALTH CARE EDUCATION/TRAINING PROGRAM

## 2021-11-25 PROCEDURE — 71045 X-RAY EXAM CHEST 1 VIEW: CPT | Mod: 26 | Performed by: RADIOLOGY

## 2021-11-25 PROCEDURE — 92507 TX SP LANG VOICE COMM INDIV: CPT | Mod: GN

## 2021-11-25 PROCEDURE — 250N000011 HC RX IP 250 OP 636: Performed by: ANESTHESIOLOGY

## 2021-11-25 PROCEDURE — 92526 ORAL FUNCTION THERAPY: CPT | Mod: GN

## 2021-11-25 PROCEDURE — 36592 COLLECT BLOOD FROM PICC: CPT

## 2021-11-25 PROCEDURE — 92610 EVALUATE SWALLOWING FUNCTION: CPT | Mod: GN

## 2021-11-25 PROCEDURE — 250N000011 HC RX IP 250 OP 636: Performed by: STUDENT IN AN ORGANIZED HEALTH CARE EDUCATION/TRAINING PROGRAM

## 2021-11-25 PROCEDURE — 250N000009 HC RX 250: Performed by: INTERNAL MEDICINE

## 2021-11-25 PROCEDURE — 94668 MNPJ CHEST WALL SBSQ: CPT

## 2021-11-25 PROCEDURE — 250N000013 HC RX MED GY IP 250 OP 250 PS 637: Performed by: THORACIC SURGERY (CARDIOTHORACIC VASCULAR SURGERY)

## 2021-11-25 PROCEDURE — 99233 SBSQ HOSP IP/OBS HIGH 50: CPT | Performed by: INTERNAL MEDICINE

## 2021-11-25 PROCEDURE — 84100 ASSAY OF PHOSPHORUS: CPT | Performed by: THORACIC SURGERY (CARDIOTHORACIC VASCULAR SURGERY)

## 2021-11-25 PROCEDURE — 250N000012 HC RX MED GY IP 250 OP 636 PS 637: Performed by: INTERNAL MEDICINE

## 2021-11-25 PROCEDURE — 120N000003 HC R&B IMCU UMMC

## 2021-11-25 PROCEDURE — 86022 PLATELET ANTIBODIES: CPT | Performed by: INTERNAL MEDICINE

## 2021-11-25 PROCEDURE — 250N000011 HC RX IP 250 OP 636

## 2021-11-25 PROCEDURE — 250N000012 HC RX MED GY IP 250 OP 636 PS 637: Performed by: PHYSICIAN ASSISTANT

## 2021-11-25 PROCEDURE — 999N000065 XR CHEST PORT 1 VIEW

## 2021-11-25 PROCEDURE — 85027 COMPLETE CBC AUTOMATED: CPT | Performed by: THORACIC SURGERY (CARDIOTHORACIC VASCULAR SURGERY)

## 2021-11-25 PROCEDURE — 94640 AIRWAY INHALATION TREATMENT: CPT

## 2021-11-25 PROCEDURE — 250N000011 HC RX IP 250 OP 636: Performed by: PHYSICIAN ASSISTANT

## 2021-11-25 PROCEDURE — 94640 AIRWAY INHALATION TREATMENT: CPT | Mod: 76

## 2021-11-25 PROCEDURE — 250N000013 HC RX MED GY IP 250 OP 250 PS 637: Performed by: NURSE PRACTITIONER

## 2021-11-25 PROCEDURE — 250N000013 HC RX MED GY IP 250 OP 250 PS 637: Performed by: ANESTHESIOLOGY

## 2021-11-25 PROCEDURE — 999N000043 HC STATISTIC CTO2 CONT OXYGEN TECH TIME EA 90 MIN

## 2021-11-25 PROCEDURE — 250N000013 HC RX MED GY IP 250 OP 250 PS 637: Performed by: INTERNAL MEDICINE

## 2021-11-25 PROCEDURE — 999N000157 HC STATISTIC RCP TIME EA 10 MIN

## 2021-11-25 PROCEDURE — 99233 SBSQ HOSP IP/OBS HIGH 50: CPT | Mod: 24 | Performed by: INTERNAL MEDICINE

## 2021-11-25 PROCEDURE — 83735 ASSAY OF MAGNESIUM: CPT | Performed by: THORACIC SURGERY (CARDIOTHORACIC VASCULAR SURGERY)

## 2021-11-25 PROCEDURE — 258N000003 HC RX IP 258 OP 636: Performed by: INTERNAL MEDICINE

## 2021-11-25 PROCEDURE — 250N000012 HC RX MED GY IP 250 OP 636 PS 637: Performed by: NURSE PRACTITIONER

## 2021-11-25 PROCEDURE — 250N000011 HC RX IP 250 OP 636: Performed by: INTERNAL MEDICINE

## 2021-11-25 PROCEDURE — 85610 PROTHROMBIN TIME: CPT | Performed by: THORACIC SURGERY (CARDIOTHORACIC VASCULAR SURGERY)

## 2021-11-25 PROCEDURE — 250N000013 HC RX MED GY IP 250 OP 250 PS 637

## 2021-11-25 PROCEDURE — 999N000215 HC STATISTIC HFNC ADULT NON-CPAP

## 2021-11-25 RX ORDER — WARFARIN SODIUM 2 MG/1
2 TABLET ORAL
Status: COMPLETED | OUTPATIENT
Start: 2021-11-25 | End: 2021-11-25

## 2021-11-25 RX ORDER — POTASSIUM CHLORIDE 20MEQ/15ML
10 LIQUID (ML) ORAL ONCE
Status: COMPLETED | OUTPATIENT
Start: 2021-11-25 | End: 2021-11-25

## 2021-11-25 RX ADMIN — CEFTAZIDIME 2 G: 2 INJECTION, POWDER, FOR SOLUTION INTRAVENOUS at 04:00

## 2021-11-25 RX ADMIN — MULTIVIT AND MINERALS-FERROUS GLUCONATE 9 MG IRON/15 ML ORAL LIQUID 15 ML: at 08:23

## 2021-11-25 RX ADMIN — WARFARIN SODIUM 2 MG: 2 TABLET ORAL at 18:04

## 2021-11-25 RX ADMIN — INSULIN GLARGINE 30 UNITS: 100 INJECTION, SOLUTION SUBCUTANEOUS at 21:08

## 2021-11-25 RX ADMIN — FLUCONAZOLE IN SODIUM CHLORIDE 400 MG: 2 INJECTION, SOLUTION INTRAVENOUS at 13:07

## 2021-11-25 RX ADMIN — Medication 5 ML: at 05:36

## 2021-11-25 RX ADMIN — NYSTATIN 1000000 UNITS: 500000 SUSPENSION ORAL at 21:07

## 2021-11-25 RX ADMIN — LEVOTHYROXINE SODIUM 25 MCG: 0.03 TABLET ORAL at 08:22

## 2021-11-25 RX ADMIN — LEVOFLOXACIN 750 MG: 5 INJECTION, SOLUTION INTRAVENOUS at 18:02

## 2021-11-25 RX ADMIN — Medication 40 MG: at 08:33

## 2021-11-25 RX ADMIN — CALCIUM CARBONATE 600 MG (1,500 MG)-VITAMIN D3 400 UNIT TABLET 1 TABLET: at 18:01

## 2021-11-25 RX ADMIN — CEFTAZIDIME 2 G: 2 INJECTION, POWDER, FOR SOLUTION INTRAVENOUS at 21:06

## 2021-11-25 RX ADMIN — CALCIUM CARBONATE 600 MG (1,500 MG)-VITAMIN D3 400 UNIT TABLET 1 TABLET: at 08:22

## 2021-11-25 RX ADMIN — INSULIN ASPART 2 UNITS: 100 INJECTION, SOLUTION INTRAVENOUS; SUBCUTANEOUS at 08:24

## 2021-11-25 RX ADMIN — TACROLIMUS 2 MG: 5 CAPSULE ORAL at 08:21

## 2021-11-25 RX ADMIN — PREDNISONE 12.5 MG: 2.5 TABLET ORAL at 08:21

## 2021-11-25 RX ADMIN — INSULIN GLARGINE 30 UNITS: 100 INJECTION, SOLUTION SUBCUTANEOUS at 08:25

## 2021-11-25 RX ADMIN — ACETYLCYSTEINE 2 ML: 200 SOLUTION ORAL; RESPIRATORY (INHALATION) at 09:31

## 2021-11-25 RX ADMIN — Medication 1 TABLET: at 08:22

## 2021-11-25 RX ADMIN — MYCOPHENOLATE MOFETIL 1000 MG: 200 POWDER, FOR SUSPENSION ORAL at 21:07

## 2021-11-25 RX ADMIN — MYCOPHENOLATE MOFETIL 1000 MG: 200 POWDER, FOR SUSPENSION ORAL at 08:24

## 2021-11-25 RX ADMIN — Medication 10 MG: at 21:08

## 2021-11-25 RX ADMIN — LEVALBUTEROL HYDROCHLORIDE 1.25 MG: 1.25 SOLUTION RESPIRATORY (INHALATION) at 20:56

## 2021-11-25 RX ADMIN — QUETIAPINE FUMARATE 25 MG: 25 TABLET ORAL at 08:21

## 2021-11-25 RX ADMIN — INSULIN ASPART 2 UNITS: 100 INJECTION, SOLUTION INTRAVENOUS; SUBCUTANEOUS at 16:14

## 2021-11-25 RX ADMIN — ACETYLCYSTEINE 2 ML: 200 SOLUTION ORAL; RESPIRATORY (INHALATION) at 20:55

## 2021-11-25 RX ADMIN — NYSTATIN 1000000 UNITS: 500000 SUSPENSION ORAL at 08:22

## 2021-11-25 RX ADMIN — INSULIN ASPART 3 UNITS: 100 INJECTION, SOLUTION INTRAVENOUS; SUBCUTANEOUS at 04:07

## 2021-11-25 RX ADMIN — ESCITALOPRAM 5 MG: 5 TABLET, FILM COATED ORAL at 08:21

## 2021-11-25 RX ADMIN — POTASSIUM CHLORIDE 10 MEQ: 20 SOLUTION ORAL at 21:07

## 2021-11-25 RX ADMIN — Medication 50 ML: at 21:12

## 2021-11-25 RX ADMIN — AMLODIPINE BESYLATE 5 MG: 2.5 TABLET ORAL at 08:29

## 2021-11-25 RX ADMIN — QUETIAPINE FUMARATE 25 MG: 25 TABLET ORAL at 15:22

## 2021-11-25 RX ADMIN — CEFTAZIDIME 2 G: 2 INJECTION, POWDER, FOR SOLUTION INTRAVENOUS at 11:03

## 2021-11-25 RX ADMIN — FUROSEMIDE 40 MG: 10 INJECTION INTRAMUSCULAR; INTRAVENOUS at 08:23

## 2021-11-25 RX ADMIN — SULFAMETHOXAZOLE AND TRIMETHOPRIM 1 TABLET: 400; 80 TABLET ORAL at 08:25

## 2021-11-25 RX ADMIN — ROSUVASTATIN CALCIUM 10 MG: 10 TABLET, FILM COATED ORAL at 08:21

## 2021-11-25 RX ADMIN — Medication 40 MG: at 21:07

## 2021-11-25 RX ADMIN — LEVALBUTEROL HYDROCHLORIDE 1.25 MG: 1.25 SOLUTION RESPIRATORY (INHALATION) at 09:31

## 2021-11-25 RX ADMIN — NYSTATIN 1000000 UNITS: 500000 SUSPENSION ORAL at 13:07

## 2021-11-25 RX ADMIN — FUROSEMIDE 40 MG: 10 INJECTION INTRAMUSCULAR; INTRAVENOUS at 15:22

## 2021-11-25 RX ADMIN — SODIUM CHLORIDE, PRESERVATIVE FREE 5 ML: 5 INJECTION INTRAVENOUS at 18:01

## 2021-11-25 RX ADMIN — OXYCODONE HYDROCHLORIDE 5 MG: 5 TABLET ORAL at 21:36

## 2021-11-25 RX ADMIN — PREDNISONE 10 MG: 5 TABLET ORAL at 21:07

## 2021-11-25 RX ADMIN — TACROLIMUS 2.5 MG: 5 CAPSULE ORAL at 18:05

## 2021-11-25 RX ADMIN — QUETIAPINE FUMARATE 50 MG: 50 TABLET ORAL at 21:08

## 2021-11-25 RX ADMIN — NYSTATIN 1000000 UNITS: 500000 SUSPENSION ORAL at 15:26

## 2021-11-25 ASSESSMENT — ACTIVITIES OF DAILY LIVING (ADL)
ADLS_ACUITY_SCORE: 23
ADLS_ACUITY_SCORE: 29
ADLS_ACUITY_SCORE: 29
ADLS_ACUITY_SCORE: 27
ADLS_ACUITY_SCORE: 29
ADLS_ACUITY_SCORE: 27
ADLS_ACUITY_SCORE: 27
ADLS_ACUITY_SCORE: 25
ADLS_ACUITY_SCORE: 29
ADLS_ACUITY_SCORE: 23
ADLS_ACUITY_SCORE: 25
ADLS_ACUITY_SCORE: 29
ADLS_ACUITY_SCORE: 25
ADLS_ACUITY_SCORE: 23
ADLS_ACUITY_SCORE: 27
ADLS_ACUITY_SCORE: 25
ADLS_ACUITY_SCORE: 29
ADLS_ACUITY_SCORE: 27
ADLS_ACUITY_SCORE: 23
ADLS_ACUITY_SCORE: 27
ADLS_ACUITY_SCORE: 27
ADLS_ACUITY_SCORE: 23

## 2021-11-25 ASSESSMENT — MIFFLIN-ST. JEOR: SCORE: 1354

## 2021-11-25 NOTE — PROGRESS NOTES
Major Shift Events:  A/Ox4 , VSS, LUCIO's but upper extremities are weak. 25 LPM/25% O2 Trach Sally Carlin #6. Patient able to make needs known with PMV and able to speak over trach when PMV out. Patient had 2 incontinent BM's and one continent BM. Continent with urinal. No complaints of pain.       Plan: Continue with monitoring patient, vitals, labs, and I/O's. Continue with POC and notify team of any significant changes.      For vital signs and complete assessments, please see documentation flowsheets.

## 2021-11-25 NOTE — PLAN OF CARE
Care Provided: 11/24/2021 from [id number]    Temp: 97.5  F (36.4  C) Temp src: Oral BP: 134/89 Pulse: 90   Resp: 18 SpO2: 97 % O2 Device: Trach dome Oxygen Delivery: 25 LPM    Neuro: A&Ox4. Upper extremities are weak, but equal and unchanged from previous assessment.   Cardiac: NSR  Respiratory: Sating *>95% on 25% and 25L trach dome. PMV in place with speech today. Okay to place on patient when RN in room. Speech therapy wrote specific orders to follow on the white board.   GI: BMx 2. Loose. Tolerating tube feeds at goal.   : Adequate urine output.   Activity: Up to the chair, pivot transfer x2 today. Turn q2hr while in bed.   Pain: At acceptable level on current regimen. Denies pain at this time.   Skin: no new deficits noted.   Incision/Surgical Site 10/16/21 Bilateral Chest (Active)   Incision Assessment WDL 11/24/21 1650   Closure Approximated 11/24/21 1650   Incision Drainage Amount None 11/24/21 1650   Dressing Intervention Open to air / No Dressing 11/24/21 1650     Lab:  Magnesium replaced via IV. Recheck ordered for the morning.   LDAs:    - Chest tube Left mid back  - PICC triple lumen Left arm      Shift Events: No acute events. Continue current therapy.   Plan: Will continue to monitor pt closely and notify primary team with any changes. ''  Problem: Adult Inpatient Plan of Care  Goal: Patient-Specific Goal (Individualized)  Outcome: Improving  Goal: Absence of Hospital-Acquired Illness or Injury  Outcome: Improving  Intervention: Identify and Manage Fall Risk  Recent Flowsheet Documentation  Taken 11/24/2021 1650 by Kurt Garcia RN  Safety Promotion/Fall Prevention:   assistive device/personal items within reach   nonskid shoes/slippers when out of bed  Taken 11/24/2021 1140 by Kurt Garcia RN  Safety Promotion/Fall Prevention:   assistive device/personal items within reach   nonskid shoes/slippers when out of bed  Taken 11/24/2021 0800 by Kurt Garcia RN  Safety Promotion/Fall  Prevention:   assistive device/personal items within reach   nonskid shoes/slippers when out of bed  Intervention: Prevent Skin Injury  Recent Flowsheet Documentation  Taken 11/24/2021 1650 by Kurt Garcia RN  Body Position:   turned   left  Taken 11/24/2021 1140 by Kurt Garcia RN  Body Position: position changed independently  Taken 11/24/2021 0800 by Kurt Garcia RN  Body Position:   supine, head elevated   turned   left  Intervention: Prevent and Manage VTE (Venous Thromboembolism) Risk  Recent Flowsheet Documentation  Taken 11/24/2021 1650 by Kurt Garcia RN  VTE Prevention/Management: anticoagulant therapy maintained  Taken 11/24/2021 1140 by Kurt Garcia RN  VTE Prevention/Management: anticoagulant therapy maintained  Taken 11/24/2021 0800 by Kurt Garcia RN  VTE Prevention/Management: anticoagulant therapy maintained  Goal: Optimal Comfort and Wellbeing  Outcome: Improving  Goal: Readiness for Transition of Care  Outcome: Improving     Problem: ARDS (Acute Respiratory Distress Syndrome)  Goal: Effective Oxygenation  Outcome: Improving     Problem: Hyperglycemia  Goal: Blood Glucose Level Within Targeted Range  Outcome: Improving     Problem: Hypertension Comorbidity  Goal: Blood Pressure in Desired Range  Outcome: Improving     Problem: Risk for Delirium  Goal: Optimal Coping  Outcome: Improving     Problem: Communication Impairment (Stroke, Ischemic/Transient Ischemic Attack)  Goal: Improved Communication Skills  Outcome: Improving  Intervention: Optimize Communication Skills  Recent Flowsheet Documentation  Taken 11/24/2021 1650 by Kurt Garcia RN  Communication Enhancement Strategies:   call light answered in person   extra time allowed for response  Taken 11/24/2021 1140 by Kurt Garcia RN  Communication Enhancement Strategies:   call light answered in person   extra time allowed for response  Taken 11/24/2021 0800 by Kurt Garcia RN  Communication Enhancement  Strategies:   call light answered in person   extra time allowed for response

## 2021-11-25 NOTE — PROGRESS NOTES
Pulmonary Medicine  Cystic Fibrosis - Lung Transplant Team  Progress Note  2021       Patient: Edson Thornton  MRN: [id number]  : 1965 (age 56 year old)  Transplant: 10/16/2021 (Lung), POD#40  Admission date: 2021    Assessment & Plan:    Edson Thornton is a 56 year old male with a PMH significant for NSIP/ILD, bronchiectasis, moderate PH, RA, SALTY, chronic HSV infection, hypogammaglobulinemia, steroid-induced diabetes, hypothyroidism, PFO, HTN, HLD, duodenal anomaly, anxiety, and depression.  Admitted on 21 from OSH for acute on chronic respiratory failure 2/2 ILD exacerbation, now s/p BSLT on 10/16/21.  Prolonged vent wean s/p trach and PEG/J tube placement with thoracic surgery 10/29.  Post-op course otherwise complicated by encephalopathy and diffuse weakness, acute to subacute CVA, afib with RVR, BRENNAN, GI bleed, Candidemia/Candida empyema, and anxiety.  Code called  for bradycardia/asystole, progressive hypotensive, found to have significant GI bleed, EGD with adherent clot near PEG tube site that was clipped.  Tolerating TD trials since  and ongoing improvement in mentation and weakness.       Today's recommendations:  - TD with Cuff down.  - Diuresis per Primary team.  - Tacrolimus repeat level  therapeutic, no dose adjustment. Repeat level  (ordered).  - Prednisone taper  not ordered.  - Coccidioides Ag on - repeat.  - Continue Zosyn for 2 week course through .  - Continue fluconazole (re-evaluate around  pending repeat fungal pleural cultures), EKG weekly for QTc monitoring (, ordered).  - Rt pleural - thoracentesis (diagnostic/therapeutic).  - Left pleural - Lytics BID for three days (ordered).  - Repeat IgG (12/15, ordered).  - DSA monitoring q2 weeks, () ordered.  - Consider checking for HIT.     S/p BSLT for ILD:  Acute hypoxic respiratory failure s/p trach:   Bilateral pleural effusions: Unfortunately had not received vaccination  for flu, PNA, or COVID-19 PTA.  Explant pathology with NSIP, no malignancy.  PGD 2-3.  Weaned off paralytic 10/19 (for vent dyssynchrony) and Caroline 10/22.  Initial difficulty weaning sedation given agitation then with neurological findings as below.  Prolonged vent wean s/p trach and PEG/J tube placement with thoracic surgery 10/29.  Code called 11/2 for bradycardia/asystole (required 1 round of CPR, no medications) then progressively hypotensive, GI bleed as below.  Chest CT 11/2 with increased bilateral pleural effusions (moderate left, small right), bibasilar atelectasis with area of hypoenhancing parenchyma in LLL (suspicious for infection), and numerous nondisplaced anterior rib fractures bilaterally.  S/p left chest tube placement in IR 11/3 for pleural effusion/empyema (as below), right deferred given very little effusion on US.  Problems with trach cuff leak 11/3 (requiring multiple exchanges), bronch 11/14 with trach in good position with cuff well sealed in trachea and small to moderate secretions mostly in lower lobes. Has tolerated continuous TD 40% since 11/20.   - Nebs: levalbuterol and Mucomyst QID  - Pulmonary toilet with chest physiotherapy QID  - TD as tolerated otherwise bipap for support  - Diuresis per ICU team  - CT chest (11/22) New cavitary lesion in the left lung base, and with multifocal bilateral upper lobe GGO (some of which are new), new 1.8 cm fluid collection with surrounding fat stranding in the left axilla, decreased bilateral loculated pleural effusions.  - BAL on 11/22/21, growing Klebsiella pneumonia.  - CXR daily while chest tube in place, will review today's when completed.  11/25/2021: CXR showing Rt base decreased opacity, Will do Rt sided thoracentesis and lytics on the left side.     Immunosuppression: s/p induction therapy with basiliximab 10/16 (and high dose IV steroid) and 10/20  - Tacrolimus 2 mg BID (suspension, via G tube).  Goal level 8-12. Repeat level 11/22  therapeutic at 10.3, no dose adjustment. Repeat level 11/26 (ordered)  - MMF 1000 mg BID (11/2, decreased given GI bleed, AZA to be avoided given TPMT)  - Prednisolone at 12.5 mg qAM, 10 mg qPM, next taper due 12/5 (not ordered)  Date AM dose (mg) PM dose (mg)   11/21/21 12.5 10   12/5/21 10 10   1/2/22 10 7.5   1/30/22 7.5 7.5   2/27/22 7.5 5   3/27/22 5 5   4/24/22 5 2.5      Prophylaxis:   - Bactrim for PJP ppx (held 11/2-11/5 d/t BRENNAN)  - Nystatin for oral candidiasis ppx, 6 month course  - See below for serologies and viral ppx:    Donor Recipient Intervention   CMV status Negative Negative None, CMV monthly (due 12/14, neg 11/16)   EBV status Positive Positive None, EBV monthly (due 12/14, neg 11/16)   HSV status N/A Positive S/p acyclovir POD #1-30 (recent infection history pre-txp)      ID: Concern for possible Strongyloides exposure pre-transplant s/p ivermectin x1 dose (9/17).  Donor and recipient cultures NGTD.  S/p IV ceftazidime/vancomycin for 48h per protocol and additional empiric ceftazidime 10/19-10/23 given recurrent fevers as below.  Cryptococcal Ag negative 10/23.   - Monthly Coccidioides Ag x12 months post-transplant per ID (urine and serum negative 10/17), 11/17 urine and serum neg  - Serum Histoplasma and Blastomyces (11/22) negative.  - Primary team to discuss CT imaging with Transplant ID for recommendations on antibiotic coverage    Recurring fevers, Resolved:  Fevers post-op, Tmax 101.7 POD #1.  Febrile with worsening leukocytosis again 10/25, generally persisting.  LP (10/29), xanthochromic with pleocytosis thought to be appropriate given RBC and WBCs, no ABX recommended per transplant ID and neurology.  S/p empiric meropenem 10/28-11/2.  Now afebrile, ABX as above.     Klebsiella pneumoniae:  Pseudomonas fluorescens/putida: Klebsiella initially noted trach sputum culture 11/10.  Started on ceftriaxone 11/11, transitioned to ertapenem 11/12-11/13, and back to ceftriaxone 11/14.  Bronch  culture 11/12 with Klebsiella and Pseudomonas fluorescens/putida (R-meropenem).     - ABX: Zosyn (11/15 - 11/23/2021) Started on IV ceftaz/leva (11/23 - now).      Disseminated Candida: Noted on blood cultures 10/20 and 10/22.  BDG fungitell positive (399) on 10/20.  Respiratory cultures with persistent Candida albicans.  TC 10/23 without evidence of endocarditis.  Ophthalmology consult 10/24 with benign dilated fundoscopic exam.  Candida empyema also noted 10/25, chest tubes inadvertently removed by CVTS 10/28, left chest tube replaced by IR 11/3 as above with ongoing Candida on cultures.   - BDG fungitell (11/22) still positive and neg for Aspergillus galactomannan Ag (11/22)  - Repeat pleural fungal cultures from 11/18 is positive. and the fungal/bacterial blood cultures (11/18), NGTD  - Fluconazole (10/26 per transplant ID, prior micafungin 10/22-10/27), repeat EKG for QTc monitoring 11/23 (ordered), LFTs as below (see transplant ID note 11/5).  11/25/2021: We will do thoracentesis on Rt side (Diagnostic/therapeutic) and left side lytics BID x3 days.     HSV: Chronic intermittent active infection pre-transplant with recent HSV infection: crusted lesions throughout left side of jaw, s/p 10 day treatment course of ACV through 10/9.  HSV PCR blood negative 10/17.  S/p ACV ppx as above (started POD #1 instead of POD #8 given HSV history and location).     Hypogammaglobulinemia: IgG previously low at 364 (9/7).  Noted at 265 at time of transplant, s/p IVIG 10/21, repeat IgG (11/17) 198, s/p IVIG (with premedication) 11/18.  - Repeat IgG (12/15, ordered)     Positive cross match: Note that he received two doses of rituximab in June, which is likely contributing to cross match result.  DSA most recently negative 11/15.  - DSA monitoring q2 weeks, (11/29) ordered     SALTY: Noted pre-transplant.  Home CPAP 6-12 cm H2O.  - Evaluate need for BiPAP after decannulation.     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  MRI 10/23 with multifocal subacute infarcts within both cerebral hemispheres and left cerebellum.  DDx include surgery v embolic v infectious.  Heparin drip started 10/23 (intermittently held with GI bleed as below).  Repeat stroke code 10/25 d/t marked decrease in responsiveness with sedation wean, pupil inequality, and absent gag reflex.  CTA head without obvious new pathology, MRI brain (with and without contrast) primarily revealing for infarct, low likelihood of PRES.  Ammonia normal.  VEEG per neuro with severe diffuse encephalopathy.  Gradual improvement noted since 10/29, repeat head CT 11/2 (following code) without new acute intracranial abnormalities.  - AC management per primary team as below  - PT/OT     Afib with RVR: Noted 10/18, started on amiodarone drip and converted to SR, transitioned to PO 10/21, decreased 10/29. Metoprolol and amiodarone discontinued 11/20 d/t intermittent bradycardia. AC per primary team.     Right subclavian DVT: Seen on UE US from 11/4.  Heparin drip resumed 11/5, transitioned to warfarin 11/20 and in the setting of thrombocytopenia. HIT negative (11/21).      Recurrent GI bleed, Resolved: Hemoglobin dropped to 6.6 10/22, s/p 2 units pRBC.  OG tube with bloody output, EGD 10/23 noted NJ/OG tube trauma with scant oozing.  Progressive hypotension 11/2, hemoglobin 5.8 with bloody G tube output.  Heparin drip held, transitioned to PPI drip, and MTP activated.  EGD 11/2 with large amount of clotted blood in stomach and area of raised mucosa with small adherent clot near PEG tube site that was clipped, no active bleeding.  Maroon stools 11/3, repeat EGD with extensive old blood in stomach, no active bleeding, small  "nodular area with prior clips clipped again.  Most recent EGD 11/5 with ulcer noted at PEG tube bumper site, gastritis, and suction marks from G tube. TF noted in G tube drainage bag 11/7, AXR with GJ tube tip projecting over proximal duodenum. GJ tube exchanged 11/9 by GI.      Elevated LFTs: Shock liver post-op, now resolved.  Intermittent alk phos elevation.     Hypomagnesemia: Suppressed reabsorption 2/2 CNI.  Requiring nearly daily IV replacement.  - IV and PO magnesium supplementation      We appreciate the excellent care provided by the CVICU and CVTS teams.  Recommendations communicated via in person rounding and this note.  Will continue to follow along closely, please do not hesitate to call with any questions or concerns.         Subjective & Interval History:     Breathing comfortably on TD 40%, 40L. Minimal cough. Denies pain. No nausea, reflux, or abdominal discomfort. Loose stools continue, improving. CPT BID. Left chest tube remains with poor output.    Sleep fair.    Review of Systems:     ROS as above otherwise limited due to communication barriers    Physical Exam:     Vital signs:  Temp: 97.9  F (36.6  C) Temp src: Oral BP: (!) 137/90 Pulse: 88   Resp: 15 SpO2: 96 % O2 Device: Trach dome Oxygen Delivery: 25 LPM Height: 162.6 cm (5' 4\") Weight: 61.3 kg (135 lb 2.3 oz)  I/O:     Intake/Output Summary (Last 24 hours) at 11/25/2021 1714  Last data filed at 11/25/2021 1700  Gross per 24 hour   Intake 2255 ml   Output 2268 ml   Net -13 ml       Constitutional: lying in bed, in no apparent distress.   HEENT: Eyes with pink conjunctivae, anicteric.  Oral mucosa moist without lesions.  Trache cdi  PULM: Fair air flow bilaterally.  Bibasilar crackles, no rhonchi, no wheezes.  Non-labored breathing on TD.  CV: Normal S1 and S2.  RRR.  No murmur, gallop, or rub.  No peripheral edema.   ABD: NABS, soft, nontender, nondistended.  PEG/J not visualized  MSK: Able to squeeze left > right hand and move " BLE.  + apparent muscle wasting.   NEURO: Alert, follows commands, nonverbal communication by head nod/shake  SKIN: Warm, dry.  No rash on limited exam.   PSYCH: Mood stable.     Lines, Drains, and Devices:  Peripheral IV 11/02/21 Anterior;Right Upper forearm (Active)   Site Assessment WDL 11/22/21 0800   Line Status Saline locked 11/22/21 0800   Dressing Intervention Dressing reinforced 11/08/21 2000   Phlebitis Scale 0-->no symptoms 11/22/21 0800   Infiltration Scale 0 11/22/21 0800   Infiltration Site Treatment Method  None 11/21/21 0400   Number of days: 20       PICC Triple Lumen 11/04/21 Left Basilic Access. PICC okay to use. (Active)   Site Assessment [facility] 11/22/21 0800   External Cath Length (cm) 1 cm 11/04/21 1747   Extremity Circumference (cm) 28 cm 11/21/21 0000   Dressing Intervention Chlorhexidine patch;Transparent 11/22/21 0800   Dressing Change Due 11/28/21 11/22/21 0800   Mosley - Status saline locked 11/22/21 0800   Mosley - Cap Change Due 11/23/21 11/22/21 0800   Red - Status infusing 11/22/21 0800   Red - Cap Change Due 11/23/21 11/22/21 0800   White - Status saline locked 11/22/21 0800   White - Cap Change Due 11/23/21 11/22/21 0800   Extravasation? No 11/17/21 2000   Line Necessity Yes, meets criteria 11/22/21 0800   Number of days: 18     Data:     LABS    CMP:   Recent Labs   Lab 11/25/21  1608 11/25/21  1111 11/25/21  0800 11/25/21  0544 11/24/21  1605 11/24/21  1547 11/24/21  0758 11/24/21  0615 11/23/21  1956 11/23/21  1711 11/23/21  0337 11/23/21  0332 11/22/21  0403 11/22/21  0359   NA  --   --   --  144  144  --  147*  --  146*  146*  --  147*  --  146*  146*   < > 144  144   POTASSIUM  --   --   --  4.0  4.0  --  3.9  --  3.9  3.9  --  4.2  --  4.5  4.5   < > 4.4  4.4   CHLORIDE  --   --   --  108  108  --  112*  --  111*  111*  --  112*  --  112*  112*   < > 112*  112*   CO2  --   --   --  28  28  --  30  --  30  30  --  30  --  30  30   < > 26  26   ANIONGAP  --   --   --   8  8  --  5  --  5  5  --  5  --  4  4   < > 6  6   * 103* 167* 190*  190*   < > 197*   < > 129*  129*   < > 122*   < > 195*  195*   < > 191*  191*   BUN  --   --   --  67*  67*  --  70*  --  70*  70*  --  64*  --  56*  56*   < > 53*  53*   CR  --   --   --  0.82  0.82  --  0.84  --  0.91  0.91  --  0.90  --  0.84  0.84   < > 0.82  0.82   GFRESTIMATED  --   --   --  >90  >90  --  >90  --  >90  >90  --  >90  --  >90  >90   < > >90  >90   ERIK  --   --   --  9.1  9.1  --  9.1  --  9.5  9.5  --  9.4  --  9.1  9.1   < > 9.4  9.4   MAG  --   --   --  2.1  --   --   --  1.9  --   --   --  2.1  --  1.9   PHOS  --   --   --  2.9  --   --   --  3.0  --   --   --  4.1  --  4.0   PROTTOTAL  --   --   --  6.4*  --   --   --  6.5*  --   --   --  6.2*  --  6.1*   ALBUMIN  --   --   --  2.4*  --   --   --  2.5*  --   --   --  2.4*  --  2.3*   BILITOTAL  --   --   --  0.2  --   --   --  0.2  --   --   --  0.4  --  0.2   ALKPHOS  --   --   --  137  --   --   --  137  --   --   --  132  --  127   AST  --   --   --  27  --   --   --  24  --   --   --  25  --  22   ALT  --   --   --  32  --   --   --  33  --   --   --  31  --  30    < > = values in this interval not displayed.     CBC:   Recent Labs   Lab 11/25/21  0544 11/24/21  0615 11/23/21  0332 11/22/21  0359   WBC 11.9* 12.3* 11.3* 13.5*   RBC 3.31* 3.37* 3.15* 3.13*   HGB 10.1* 10.2* 9.6* 9.5*   HCT 33.2* 33.8* 30.5* 30.7*    100 97 98   MCH 30.5 30.3 30.5 30.4   MCHC 30.4* 30.2* 31.5 30.9*   RDW 16.5* 16.5* 16.7* 16.6*   PLT 66* 80* 79* 81*       INR:   Recent Labs   Lab 11/25/21  0544 11/24/21  0615 11/23/21  0332 11/22/21  0359   INR 2.48* 2.76* 3.41* 3.47*       Glucose:   Recent Labs   Lab 11/25/21  1608 11/25/21  1111 11/25/21  0800 11/25/21  0544 11/25/21  0404 11/25/21  0015   * 103* 167* 190*  190* 194* 107*       Blood Gas:   Recent Labs   Lab 11/22/21  0426 11/22/21  0103 11/21/21  1029   PHV 7.37 7.41 7.38   PCO2V 43 42 49    PO2V 34 36 34   HCO3V 25 27 29*   LORI -0.4 1.9 3.1*   O2PER 40 40 40       Culture Data No results for input(s): CULT in the last 168 hours.    Virology Data:   Lab Results   Component Value Date    FLUAH1 Negative 11/22/2021    FLUAH3 Negative 11/22/2021    MP5056 Negative 11/22/2021    IFLUB Negative 11/22/2021    RSVA Negative 11/22/2021    RSVB Negative 11/22/2021    PIV1 Negative 11/22/2021    PIV2 Negative 11/22/2021    PIV3 Negative 11/22/2021    HMPV Negative 11/22/2021    HRVS Negative 11/22/2021    ADVBE Negative 11/22/2021    ADVC Negative 11/22/2021    ADVC Negative 11/12/2021    ADVC Negative 10/17/2021       Historical CMV results (last 3 of prior testing):  Lab Results   Component Value Date    CMVQNT Not Detected 11/22/2021    CMVQNT Not Detected 11/16/2021    CMVQNT Not Detected 11/12/2021     No results found for: CMVLOG    Urine Studies    Recent Labs   Lab Test 11/01/21  1336 10/25/21  1507   URINEPH 5.5 5.5   NITRITE Negative Negative   LEUKEST Negative Negative   WBCU 0 2       Most Recent [place] Pulmonary Function Testing (FVC/FEV1 only)  No results found for: 20002  No results found for: 20003  No results found for: 20015  No results found for: 20016    IMAGING    Recent Results (from the past 48 hour(s))   XR Chest Port 1 View    Narrative    Exam: XR CHEST PORT 1 VIEW, 11/21/2021 4:28 AM    Comparison: Chest x-ray 11/20/2021    History: s/p lung transplant    Findings:  Single portable AP semiupright view of the chest is obtained.  Postoperative changes of bilateral lung transplantation, clamshell  sternotomy wires are intact. Tracheostomy tube tip is in the thoracic  trachea. Left upper extremity PICC tip terminates in the superior  cavoatrial junction. Left basilar chest tube in place.    Trachea is midline. Mediastinum is within normal limits.  Cardiopulmonary silhouette is within normal limits. Trace bilateral  effusions and associated atelectasis. No appreciable pneumothorax.  The  upper abdomen is unremarkable.      Impression    Impression:   1. Slightly reduced bilateral effusions.  2. Reduced left basilar atelectasis.  3. Slightly increased right basilar atelectasis.    I have personally reviewed the examination and initial interpretation  and I agree with the findings.    SHANDRA CEVALLOS MD         SYSTEM ID:  [id number]   CT Chest w/o Contrast    Narrative    EXAMINATION: CT CHEST W/O CONTRAST, 11/22/2021 6:02 AM    CLINICAL HISTORY: Abnormal xray - pleural effusion    COMPARISON: Radiograph 11/21/2021, 11/20/2021. CT 11/2/2021.    TECHNIQUE: CT imaging obtained through the chest without contrast.  Coronal, sagittal and axial MIP reformatted images obtained and  reviewed.     FINDINGS:    Lines, tubes, devices: Tracheostomy in place. Left upper extremity  PICC terminating at the superior cavoatrial junction. Left basilar  chest tube. Median sternotomy wires are intact.    Lungs: Post surgical changes of bilateral lung transplantation. The  central tracheobronchial tree is patent. Peripheral mucous plugging in  the bilateral lower lobes. Multifocal groundglass opacities some of  which are new compared to CT 11/2/2021. Interval development of a  cavitary lesion of the left lung base measuring 2.7 x 2.9 cm (series 6  image 190).  Decreased bilateral loculated pleural effusions with  associated compressive atelectasis.    Mediastinum: The visualized thyroid is unremarkable.Heart size is  enlarged. Small pericardial effusion. Mild coronary artery  calcifications. The thoracic aorta and main pulmonary artery are  within normal limits. Standard branching pattern of the great vessels.  No abnormal thoracic lymph nodes.    Bones and soft tissues: Subacute bilateral rib fractures. 1.2 x 1.8 cm  fluid collection in the left axilla with surrounding fat stranding  (series 2 image 27). No suspicious osseous lesion. Mild degenerative  changes of the thoracolumbar spine.    Upper abdomen:  Limited  evaluation of the upper abdomen. No acute  pathology of the visualized solid organs. Percutaneous  gastrojejunostomy tube.      Impression    IMPRESSION:   1. New cavitary lesion in the left lung base with multifocal  groundglass opacities some of which are new compared to CT 11/2/2021,  notably in the bilateral upper lobes concerning for ongoing infection.  2. Decreased bilateral loculated pleural effusions.  3. New 1.8 cm fluid collection with surrounding fat stranding in the  left axilla.  4. Similar small pericardial effusion.    I have personally reviewed the examination and initial interpretation  and I agree with the findings.    ARIES DELGADO MD         SYSTEM ID:  [id number]

## 2021-11-25 NOTE — PROGRESS NOTES
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 Corral. Prolonged vent wean s/p trach and PEG/J tube placement with thoracic surgery 10/29.  Post-op course otherwise complicated by encephalopathy and diffuse weakness, acute to subacute CVA, afib with RVR, BRENNAN, GI bleed, Candidemia/Candida empyema, and anxiety.  Code called 11/2 for bradycardia/asystole, progressive hypotensive, found to have significant GI bleed, EGD with adherent clot near PEG tube site that was clipped.  Tolerating TD trials since 11/20 and ongoing improvement in mentation and weakness.  Patient has continued to improve and transferred to the floor 11/23.  Medicine team primary, CVTS following for incision and chest tube management.     - Currently being treated with ceftazidime, levaquin and fluconazole per transplant ID.   - All surgical drains have been removed  - Left pigtail placed 11/3 for empyema/pleural effusion  - Keeping pigtail until at least completion of antifungal, starting lytics per pulm 11/24  - CT chest 11/22 showed new cavitary lesion in the left lung base, and with multifocal bilateral upper lobe GGO (some of which are new), new 1.8 cm fluid collection with surrounding fat stranding in the left axilla, decreased bilateral loculated pleural effusions.  - Other cares per medicine and pulmonary team    D/w Dr. Shayna De La Fuente PA-C  Cardiothoracic Surgery  p: [phone]          Interval History:   No acute events overnight. States pain is well managed on current regimen. No acute complaints.          Medications:       acetylcysteine  2 mL Nebulization BID     amLODIPine  5 mg Per G Tube Daily     calcium carbonate 600 mg-vitamin D 400 units  1 tablet Oral or Feeding Tube BID w/meals     cefTAZidime  2 g  "Intravenous Q8H     escitalopram  5 mg Oral or Feeding Tube Daily     fluconazole  400 mg Intravenous Q24H     folic acid-vit B6-vit B12  1 tablet Per G Tube Daily     furosemide  40 mg Intravenous BID     heparin lock flush  5-20 mL Intracatheter Q24H     insulin aspart  1-12 Units Subcutaneous Q4H     insulin glargine  30 Units Subcutaneous BID     levalbuterol  1.25 mg Nebulization BID     levofloxacin  750 mg Intravenous Q24H     levothyroxine  25 mcg Oral Daily     lidocaine  2 patch Transdermal Q24H     lidocaine   Transdermal Q8H     melatonin  10 mg Oral QPM     multivitamins w/minerals  15 mL Per Feeding Tube Daily     mycophenolate  1,000 mg Per J Tube BID     nystatin  1,000,000 Units Swish & Swallow 4x Daily     pantoprazole  40 mg Per Feeding Tube BID     predniSONE  10 mg Per J Tube QPM     predniSONE  12.5 mg Per J Tube Daily     QUEtiapine  25 mg Oral or Feeding Tube BID     QUEtiapine  50 mg Oral At Bedtime     rosuvastatin  10 mg Oral or Feeding Tube Daily     sodium chloride (PF)  10-40 mL Intracatheter Q8H     sodium chloride (PF)  3 mL Intracatheter Q8H     sulfamethoxazole-trimethoprim  1 tablet Oral or Feeding Tube Daily     tacrolimus  2 mg Oral QAM     tacrolimus  2.5 mg Oral QPM     warfarin ANTICOAGULANT  2 mg Oral ONCE at 18:00     acetaminophen, albuterol, bisacodyl, dextrose, glucose **OR** dextrose **OR** glucagon, heparin lock flush, hydrALAZINE, hydrOXYzine **OR** hydrOXYzine, labetalol, lidocaine (viscous), magnesium hydroxide, naloxone **OR** naloxone **OR** naloxone **OR** naloxone, ondansetron **OR** ondansetron, oxyCODONE, oxymetazoline, prochlorperazine **OR** prochlorperazine, QUEtiapine, senna-docusate, sodium chloride (PF), sodium chloride (PF), Warfarin Therapy Reminder          Physical Exam:   Vitals were reviewed  Blood pressure 134/85, pulse 96, temperature 97.6  F (36.4  C), temperature source Oral, resp. rate 11, height 1.626 m (5' 4\"), weight 61.3 kg (135 lb 2.3 oz), " SpO2 96 %.  Vitals:    11/23/21 0400 11/24/21 0520 11/25/21 0400   Weight: 60 kg (132 lb 4.4 oz) 60.7 kg (133 lb 13.1 oz) 61.3 kg (135 lb 2.3 oz)      I/O last 3 completed shifts:  In: 2360 [I.V.:380; NG/GT:810]  Out: 1890 [Urine:1775; Emesis/NG output:115]    Gen: A&Ox4, NAD  Pulm:  Bibasilar crackles, no rhonchi, no wheezes. Non-labored breathing on TD.  CV: Normal S1 and S2.  RRR.  No murmur, gallop, or rub.  No peripheral edema.   Abd: NABS, soft, nontender, nondistended.   Tubes/drains: Dressing clean and dry, minimal output serosanguinous output, no air leak.          Data:    All labs and imaging reviewed by me.  Labs:    Data   BMP  Recent Labs   Lab 11/25/21  0800 11/25/21  0544 11/25/21  0404 11/25/21  0015 11/24/21  1605 11/24/21  1547 11/24/21  0758 11/24/21  0615 11/23/21  1956 11/23/21  1711   NA  --  144  144  --   --   --  147*  --  146*  146*  --  147*   POTASSIUM  --  4.0  4.0  --   --   --  3.9  --  3.9  3.9  --  4.2   CHLORIDE  --  108  108  --   --   --  112*  --  111*  111*  --  112*   ERIK  --  9.1  9.1  --   --   --  9.1  --  9.5  9.5  --  9.4   CO2  --  28  28  --   --   --  30  --  30  30  --  30   BUN  --  67*  67*  --   --   --  70*  --  70*  70*  --  64*   CR  --  0.82  0.82  --   --   --  0.84  --  0.91  0.91  --  0.90   * 190*  190* 194* 107*   < > 197*   < > 129*  129*   < > 122*    < > = values in this interval not displayed.     CBC  Recent Labs   Lab 11/25/21  0544 11/24/21  0615 11/23/21 0332 11/22/21  0359   WBC 11.9* 12.3* 11.3* 13.5*   RBC 3.31* 3.37* 3.15* 3.13*   HGB 10.1* 10.2* 9.6* 9.5*   HCT 33.2* 33.8* 30.5* 30.7*    100 97 98   MCH 30.5 30.3 30.5 30.4   MCHC 30.4* 30.2* 31.5 30.9*   RDW 16.5* 16.5* 16.7* 16.6*   PLT 66* 80* 79* 81*     INR  Recent Labs   Lab 11/25/21  0544 11/24/21  0615 11/23/21  0332 11/22/21  0359   INR 2.48* 2.76* 3.41* 3.47*      Hepatic Panel   Recent Labs   Lab 11/25/21  0544 11/24/21  0615 11/23/21  0332  11/22/21  0359   AST 27 24 25 22   ALT 32 33 31 30   ALKPHOS 137 137 132 127   BILITOTAL 0.2 0.2 0.4 0.2   ALBUMIN 2.4* 2.5* 2.4* 2.3*     Glucose  Recent Labs   Lab 11/25/21  0800 11/25/21  0544 11/25/21  0404 11/25/21  0015 11/24/21  2023 11/24/21  1605   * 190*  190* 194* 107* 124* 174*       Imaging:  No results found for this or any previous visit (from the past 24 hour(s)).

## 2021-11-25 NOTE — PROGRESS NOTES
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 Post-op course complicated encephalopathy, and diffuse weakness, acute to subacute by CVA, afib with RVR, BRENNAN, candidemia/candida empyema, and Code Blue due to bradycardia/asystole and hypotension for significant GIB s/p EGD with adherent clot near PEG tube site s/p clips. Transferred from ICU to medicine on 11/23/2021.     Plan for today:  Continue antibiotics (fluconazole has been stopped)  Pulmonary toilet  Clinical psychology consult if patient wishes  PT/OT     # ILD and NSIP s/p BSLT on 10/16/2021, POD 38  # Acute hypoxic respiratory failure s/p tracheostomy on 10/29  # Bilateral pleural effusions   - Transplant pulmonology following, appreciate recs   - IS: s/p basiliximab on 10/16 and 10/20. Continue tacrolimus 2 mg QAM and 2.5 mg QPM (goal 8-12), MMF 1000 mg BID, and prednisone 12.5 mg QAM and 10 mg QPM  - Ppx: Continue bactrim 400-80 mg daily, nystatin QID x6 months  - Monthly Coccidioides Ag x12 months post-transplant per ID (urine and serum negative 11/17)   - DSA every 2 weeks   - Levalbuterol and mucomyst QID and pulm toilet and chest PT QID  - O2 via trach dome as tolerated otherwise BiPAP for support   - Daily CXR   - Diuresis with IV lasix 40 mg BID  - Oxycodone 5 mg Q4H PRN    - PT/OT/SLP      # L lung cavitary lesion - suspect aspiration vs pseudomonal vs K pneumonia induced abscess. CT on 10/25 with LLL nodular opacity. Repeat CT chest on 11/22 with new  cavitary lesion in the left lung base, and with multifocal bilateral upper lobe GGO (some of which are new), new 1.8 cm fluid collection with surrounding fat stranding in the left axilla, decreased bilateral loculated pleural effusions. Indeterminate Quant Gold, but negative tuberculin skin tests on mulitple occurences. S/p BAL on 11/22. ID feels less likely fungal.   - Transplant ID following   - Follow up histo and blastomyces serum ab, BDG fungitell and aspergillus Ag (11/22)   - Follow up BAL studies   - ID recommends stopping zosyn (11/15-11/23), start Ceftazdime 2 grams Q8H and levaquin 750 mg daily    - Follow up CT chest in 4 weeks  - Plan for diagnostic/therapeutic thoracentesis in IR, likely tomorrow vs monday     # Klebsiella pneumoniae and Pseudomonas fluorescens/putida HAP- Klebsiella initially noted trach sputum culture 11/10. Bronch culture 11/12 with Klebsiella and Pseudomonas fluorescens/putida (R-meropenem).     - Abx as above.       # Disseminated Candida - + candida BCx 10/20 and 10/22.  BDG fungitell positive (399) on 10/20. Sputum Cx + Candida albicans persistently.  TC 10/23 without evidence of endocarditis. Ophthalmology consult 10/24 with benign dilated fundoscopic exam.  Candida empyema also noted 10/25, chest tubes inadvertently removed by CVTS 10/28, left chest tube replaced by IR 11/3 as above with ongoing Candida on cultures. Repeat pleural fungal cultures and fungal/bacterial blood cultures on 11/18 NGTD. Transplant as noted above following   - L sided chest tube in place  - Follow up BDG fungitell and Aspergillus galactomannan Ag (11/22)   - Initially on Micafungin (10/22-10/27) transition to fluconazole 400 mg IV daily (start date 10/26), with plans to re-evaluate on 11/24  - EKG for QTc monitoring (ordered for 11/27)   - ID recommends evacuate any remaining R and/or left loculated effusions      # Hypogammaglobinemia - s/p IVIG with plan to repeat IgG on 12/15     # Encephalopathy  - likely multi-factorial. Multiple brain imaging with stroke as noted below, but negative for PRESS. Ammonia negative. vEEG with severe diffuse encephalopathy. LP as noted below negative for infection. Neurology previously following.   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 MRI brain on 10/23 with multifocal subacute infarct within both cerebral hemispheres and left cerebellum. Presumed embolic with afib hx.   - Anticoagulation as noted below      # Afib with hx of RVR - [id number] 4 for HTN, CVA, and DM2. First noted on 10/18, started on amiodarone drip, and converted to NSR. Metoprolol and amidoarone discontinued on 11/20 d/t intermittent bradycardia.   - Anticoagulation with warfarin as noted below  - Continue Rosuvastatin 10 mg daily      # R subclavian DVT - dx on 11/4. Resumed on heparin drip on 11/5 and transitioned to warfarin in setting of thrombocytopenia. HIT panel negative on 11/21.   - Continue warfarin per pharmacy protocol     # DM2 with steroid induced hyperglycemia - Hemoglobin A1c 6.6 pre-operatively. Endocrine recently consulted for steroids induced hyperglycemia.   - Continue Lantus 30 mg BID  - Novolog High Sliding scale insulin Q4H  - BG Q4H      # Hypernatremia - Na 146. Currently receiving 100 mL of free water via TF Q4H. Trend BMP      # Hypomagnesemia - Likely 2/2 suppressed reabsorption from CNI.   - Sliding scale replacement protocol       # Diarrhea - C. Diff negative on 11/20. Rectal tube in place on 11/22. Possibly due to tube feeds or magnesium supplementation.   - Monitor I&O      # Malnutrition - S/p PEGJ  - MVI, folic acid, B6, B12 supplements   - TF per nutrition      ------ Chronic stable medical problems ------     # RA- Dx 5/2021 with + CCP ab and RF. Previously treated with rituximab. Rheum  consulted early in admission. On steroids as noted above.   # SALTY - Uses CPAP at bedtime prior to admission. Will need to evaluate for BiPAP after decannulation   # HTN- Continue PTA amlodipine 5 mg daily. PRN labetalol and hydralazine for goal SBP <140   # Anxiety and depression - Continue PTA lexapro 5 mg daily   # Hypothyroidism - TSH 3.17 on 11/19/21. Continue PTA levothyroxine 25 mcg daily      ------ Resolved Hospital problems -------     # Recurrent GIB - hgb drop on 10/22 s/p 2 unit pRBC transfusion with EGD on 10/23 with NJ/OG tube trauma with scant oozing. Patient then developed progressive hypotension and ultimately CODE BLUE for GIB in setting of anticoagulation with heparin drip, s/p MTP. EGD on 11/2 with large amount of clotted blood in stomach and area of raised mucosa with small adherent clot near PEG tube site that was clipped, no active bleeding.  Maroon stools 11/3, repeat EGD with extensive old blood in stomach, no active bleeding, small nodular area with prior clips clipped again.  Most recent EGD 11/5 with ulcer noted at PEG tube bumper site, gastritis, and suction marks from G tube. TF noted in G tube drainage bag 11/7, AXR with GJ tube tip projecting over proximal duodenum. GJ tube exchanged 11/9 by GI.  - PO PPI BID      # Transaminitis - elevated LFTs post-op, thought to be due to shock liver      # BRENNAN - post operative BRNENAN, Cr peaked at 2.05, with renal function back at baseline with Cr at 0.7     # Recurring fevers - Fevers post-op, Tmax 101.7 POD #1.  Febrile with worsening leukocytosis again 10/25, generally persisting.  LP (10/29), xanthochromic with pleocytosis thought to be appropriate given RBC and WBCs, no ABX recommended per transplant ID and neurology.  S/p empiric meropenem 10/28-11/2.  Now afebrile, ABX as above.     # HSV-  Chronic intermittent active infection pre-transplant with recent HSV infection: crusted lesions throughout left side of jaw, s/p 10 day treatment course of  ACV through 10/9.  HSV PCR blood negative 10/17.  S/p ACV ppx as above (started POD #1 instead of POD #8 given HSV history and location).         Diet: Adult Formula Drip Feeding: Continuous Pivot 1.5; Jejunostomy; Goal Rate: 65; mL/hr; Medication - Feeding Tube Flush Frequency: At least 15-30 mL water before and after medication administration and with tube clogging; 11/22: Once current liter (V...    DVT Prophylaxis: Heparin SQ  Watson Catheter: Not present  Central Lines: None  Code Status: Full Code      Disposition Plan   Expected discharge:  > 7 days recommended to transitional care unit once antibiotic plan established.     The patient's care was discussed with the Bedside Nurse, Patient and Pulm Consultant.    Vincent Koo MD  Hospitalist Service  [facility]  Securely message with the Vocera Web Console (learn more here)  Text page via [employer] Paging/Directory    Please see sign in/sign out for up to date coverage information    Clinically Significant Risk Factors Present on Admission                ______________________________________________________________________    Interval History   Patient seen and examined.  No acute overnight events.  No HA, dizziness, CP, N/V, abdominal pain or other symptoms currently.     Data reviewed today: I reviewed all medications, new labs and imaging results over the last 24 hours. I personally reviewed no images or EKG's today.    Physical Exam   Vital Signs: Temp: 97.6  F (36.4  C) Temp src: Oral BP: (!) 135/107 Pulse: 73   Resp: 11 SpO2: 96 % O2 Device: Trach dome Oxygen Delivery: 25 LPM  Weight: 135 lbs 2.27 oz  General Appearance: NAD  Respiratory: Normal work of breathing, diminished at the bases, transmitted upper airway noises  Cardiovascular: RRR S1/S2, no m,r,g  GI: soft, NT, ND, +BS  Skin: no rashes  Other: No edema     Data   Recent Labs   Lab 11/25/21  0800 11/25/21  0544 11/25/21  0404 11/24/21  1605  11/24/21  1547 11/24/21  0758 11/24/21  0615 11/23/21  0337 11/23/21  0332   WBC  --  11.9*  --   --   --   --  12.3*  --  11.3*   HGB  --  10.1*  --   --   --   --  10.2*  --  9.6*   MCV  --  100  --   --   --   --  100  --  97   PLT  --  66*  --   --   --   --  80*  --  79*   INR  --  2.48*  --   --   --   --  2.76*  --  3.41*   NA  --  144  144  --   --  147*  --  146*  146*   < > 146*  146*   POTASSIUM  --  4.0  4.0  --   --  3.9  --  3.9  3.9   < > 4.5  4.5   CHLORIDE  --  108  108  --   --  112*  --  111*  111*   < > 112*  112*   CO2  --  28  28  --   --  30  --  30  30   < > 30  30   BUN  --  67*  67*  --   --  70*  --  70*  70*   < > 56*  56*   CR  --  0.82  0.82  --   --  0.84  --  0.91  0.91   < > 0.84  0.84   ANIONGAP  --  8  8  --   --  5  --  5  5   < > 4  4   ERIK  --  9.1  9.1  --   --  9.1  --  9.5  9.5   < > 9.1  9.1   * 190*  190* 194*   < > 197*   < > 129*  129*   < > 195*  195*   ALBUMIN  --  2.4*  --   --   --   --  2.5*  --  2.4*   PROTTOTAL  --  6.4*  --   --   --   --  6.5*  --  6.2*   BILITOTAL  --  0.2  --   --   --   --  0.2  --  0.4   ALKPHOS  --  137  --   --   --   --  137  --  132   ALT  --  32  --   --   --   --  33  --  31   AST  --  27  --   --   --   --  24  --  25    < > = values in this interval not displayed.     No results found for this or any previous visit (from the past 24 hour(s)).  Medications     dextrose Stopped (10/24/21 1008)     Warfarin Therapy Reminder         acetylcysteine  2 mL Nebulization BID     amLODIPine  5 mg Per G Tube Daily     calcium carbonate 600 mg-vitamin D 400 units  1 tablet Oral or Feeding Tube BID w/meals     cefTAZidime  2 g Intravenous Q8H     escitalopram  5 mg Oral or Feeding Tube Daily     fluconazole  400 mg Intravenous Q24H     folic acid-vit B6-vit B12  1 tablet Per G Tube Daily     furosemide  40 mg Intravenous BID     heparin lock flush  5-20 mL Intracatheter Q24H     insulin aspart  1-12 Units  Subcutaneous Q4H     insulin glargine  30 Units Subcutaneous BID     levalbuterol  1.25 mg Nebulization BID     levofloxacin  750 mg Intravenous Q24H     levothyroxine  25 mcg Oral Daily     lidocaine  2 patch Transdermal Q24H     lidocaine   Transdermal Q8H     melatonin  10 mg Oral QPM     multivitamins w/minerals  15 mL Per Feeding Tube Daily     mycophenolate  1,000 mg Per J Tube BID     nystatin  1,000,000 Units Swish & Swallow 4x Daily     pantoprazole  40 mg Per Feeding Tube BID     predniSONE  10 mg Per J Tube QPM     predniSONE  12.5 mg Per J Tube Daily     QUEtiapine  25 mg Oral or Feeding Tube BID     QUEtiapine  50 mg Oral At Bedtime     rosuvastatin  10 mg Oral or Feeding Tube Daily     sodium chloride (PF)  10-40 mL Intracatheter Q8H     sodium chloride (PF)  3 mL Intracatheter Q8H     sulfamethoxazole-trimethoprim  1 tablet Oral or Feeding Tube Daily     tacrolimus  2 mg Oral QAM     tacrolimus  2.5 mg Oral QPM

## 2021-11-25 NOTE — PROGRESS NOTES
11/25/21 1202   General Information   Onset of Illness/Injury or Date of Surgery 09/05/21   Referring Physician Vincent Koo MD   Patient/Family Therapy Goal Statement (SLP) None stated   Pertinent History of Current Problem Edson Thornton is a 57 yo M with PMHx of NSIP/ILD 2/2 Rheumatoid lung disease, RA, bronchiectasis, moderately pulm HTN, SALTY, HTN, HLD, PLD, hypogammaglobinemia, duodenal anomaly, anxiety, and depression s/p bilateral lung transplant on 10/16/2021, with post operative course complicated by prolonged vent wean s/p trach and PEG/J tube placement with thoracic surgery on 10/29. Post-op course complicated encephalopathy, and diffuse weakness, acute to subacute by CVA, afib with RVR, BRENNAN, candidemia/candida empyema, and Code Blue due to bradycardia/asystole and hypotension for significant GIB s/p EGD with adherent clot near PEG tube site s/p clips. Transferred from ICU to medicine on 11/23/2021. Pt has been followed by SLP caselod for PMSV. MD placed swallow eval orders. Clinical swallow eval completed per MD orders to further assess oropharyngeal swallow function.    Past History of Dysphagia No hx of dysphagia per chart review or pt report.    Type of Evaluation   Type of Evaluation Swallow Evaluation   Oral Motor   Oral Musculature generally intact   Structural Abnormalities none present   Mucosal Quality adequate   Dentition (Oral Motor)   Dentition (Oral Motor) adequate dentition   Facial Symmetry (Oral Motor)   Facial Symmetry (Oral Motor) WNL   Lip Function (Oral Motor)   Lip Range of Motion (Oral Motor) WNL   Tongue Function (Oral Motor)   Tongue ROM (Oral Motor) WNL   Jaw Function (Oral Motor)   Jaw Function (Oral Motor) WNL   Cough/Swallow/Gag Reflex (Oral Motor)   Soft Palate/Velum (Oral Motor) WNL   Volitional Throat Clear/Cough (Oral Motor) reduced strength   Vocal Quality/Secretion Management (Oral Motor)   Vocal Quality (Oral Motor) WFL  (with PMSV in place)   Secretion Management  (Oral Motor) WNL   General Swallowing Observations   Current Diet/Method of Nutritional Intake (General Swallowing Observations, NIS) NPO;gastrostomy tube (PEG)   Respiratory Support (General Swallowing Observations) supplemental oxygen  (25LPM with 25% FiO2 via HFTD (PMSV in place))   Swallowing Evaluation Clinical swallow evaluation   Clinical Swallow Evaluation   Feeding Assistance frequent cues/help required   Clinical Swallow Evaluation Textures Trialed thin liquids   Clinical Swallow Eval: Thin Liquid Texture Trial   Mode of Presentation, Thin Liquids spoon;cup;self-fed;fed by clinician   Volume of Liquid or Food Presented 4 ice chips, 3 oz H20   Oral Phase of Swallow WFL   Pharyngeal Phase of Swallow intact   Diagnostic Statement No overt s/sx of aspiration with ice chips and thin H20 via cup. Mild increased WOB with PO trials.    Esophageal Phase of Swallow   Patient reports or presents with symptoms of esophageal dysphagia No   Swallowing Recommendations   Diet Consistency Recommendations ice chips only;NPO   Instrumental Assessment Recommendations VFSS (videofluroscopic swallowing study)   General Therapy Interventions   Planned Therapy Interventions Dysphagia Treatment   Dysphagia treatment   (PO readiness)   SLP Therapy Assessment/Plan   Criteria for Skilled Therapeutic Interventions Met (SLP Eval) yes;treatment indicated   SLP Diagnosis mild oropharyngeal dysphagia    Rehab Potential (SLP Eval) fair, will monitor progress closely   Therapy Frequency (SLP Eval) 5 times/wk   Predicted Duration of Therapy Intervention (SLP Eval) 2 weeks   Comment, Therapy Assessment/Plan (SLP) Clinical swallow eval completed per MD orders. Pt presents with mild oropharyngeal dysphagia in the setting of prolonged hospitlization s/p BSLT. Pt's swallow function assessed with PMSV in place while on 25LPM with 25% FiO2 via HFTD. Pt's oral musculature WFL. Oral cares completed. Pt tolerated ice chips and thin liquids via cup  with no overt s/sx of aspiration. Mild increased WOB noted with PO trials. No additional PO trials given due to high incidence of silent aspiration s/p BSLT. Recommend pt continue NPO with excellent oral cares. OK for small amounts of ice chips for comfort after oral cares with PMSV in place. Recommend completion of XR video swallow study to further assess aspiration risk given high incidence of silent aspiration s/p BSLT. ST to continue to follow. Anticipate pt will require ST follow up at discharge.    Therapy Plan Review/Discharge Plan (SLP)   Therapy Plan Review (SLP) evaluation/treatment results reviewed;care plan/treatment goals reviewed;risks/benefits reviewed;current/potential barriers reviewed;participants voiced agreement with care plan;participants included;patient   Demonstrates Need for Referral to Another Service (SLP) clinical nutrition services/dietitian;occupational therapist;physical therapist;respiratory therapist   SLP Discharge Planning    SLP Discharge Recommendation (DC Rec) [facility] patient will benefit from intensive, interdisciplinary therapy.  Anticipate will be able to tolerate 3 hours of therapy per day;home with home care speech therapy   SLP Rationale for DC Rec pt would benefit from continued ST targeting PMSV tolerance and dysphagia management   SLP Brief overview of current status  Recommend pt continue NPO with excellent oral cares. OK for small amounts of ice chips for comfort after oral cares with PMSV in place. Recommend completion of XR video swallow study to further assess aspiration risk given high incidence of silent aspiration s/p BSLT.    Total Evaluation Time   Total Evaluation Time (Minutes) 13

## 2021-11-26 ENCOUNTER — APPOINTMENT (OUTPATIENT)
Dept: PHYSICAL THERAPY | Facility: CLINIC | Age: 56
End: 2021-11-26
Attending: STUDENT IN AN ORGANIZED HEALTH CARE EDUCATION/TRAINING PROGRAM
Payer: COMMERCIAL

## 2021-11-26 ENCOUNTER — APPOINTMENT (OUTPATIENT)
Dept: GENERAL RADIOLOGY | Facility: CLINIC | Age: 56
End: 2021-11-26
Attending: INTERNAL MEDICINE
Payer: COMMERCIAL

## 2021-11-26 ENCOUNTER — APPOINTMENT (OUTPATIENT)
Dept: SPEECH THERAPY | Facility: CLINIC | Age: 56
End: 2021-11-26
Attending: STUDENT IN AN ORGANIZED HEALTH CARE EDUCATION/TRAINING PROGRAM
Payer: COMMERCIAL

## 2021-11-26 ENCOUNTER — APPOINTMENT (OUTPATIENT)
Dept: OCCUPATIONAL THERAPY | Facility: CLINIC | Age: 56
End: 2021-11-26
Attending: STUDENT IN AN ORGANIZED HEALTH CARE EDUCATION/TRAINING PROGRAM
Payer: COMMERCIAL

## 2021-11-26 LAB
ALBUMIN SERPL-MCNC: 2.5 G/DL (ref 3.4–5)
ALP SERPL-CCNC: 141 U/L (ref 40–150)
ALT SERPL W P-5'-P-CCNC: 30 U/L (ref 0–70)
ANION GAP SERPL CALCULATED.3IONS-SCNC: 8 MMOL/L (ref 3–14)
ANION GAP SERPL CALCULATED.3IONS-SCNC: 8 MMOL/L (ref 3–14)
ANION GAP SERPL CALCULATED.3IONS-SCNC: 9 MMOL/L (ref 3–14)
AST SERPL W P-5'-P-CCNC: 26 U/L (ref 0–45)
BACTERIA CSF CULT: NO GROWTH
BILIRUB SERPL-MCNC: 0.2 MG/DL (ref 0.2–1.3)
BUN SERPL-MCNC: 70 MG/DL (ref 7–30)
BUN SERPL-MCNC: 71 MG/DL (ref 7–30)
BUN SERPL-MCNC: 71 MG/DL (ref 7–30)
CALCIUM SERPL-MCNC: 9.1 MG/DL (ref 8.5–10.1)
CALCIUM SERPL-MCNC: 9.4 MG/DL (ref 8.5–10.1)
CALCIUM SERPL-MCNC: 9.4 MG/DL (ref 8.5–10.1)
CHLORIDE BLD-SCNC: 106 MMOL/L (ref 94–109)
CHLORIDE BLD-SCNC: 108 MMOL/L (ref 94–109)
CHLORIDE BLD-SCNC: 108 MMOL/L (ref 94–109)
CO2 SERPL-SCNC: 27 MMOL/L (ref 20–32)
CO2 SERPL-SCNC: 28 MMOL/L (ref 20–32)
CO2 SERPL-SCNC: 28 MMOL/L (ref 20–32)
CREAT SERPL-MCNC: 0.82 MG/DL (ref 0.66–1.25)
CREAT SERPL-MCNC: 0.82 MG/DL (ref 0.66–1.25)
CREAT SERPL-MCNC: 0.83 MG/DL (ref 0.66–1.25)
ERYTHROCYTE [DISTWIDTH] IN BLOOD BY AUTOMATED COUNT: 16 % (ref 10–15)
GFR SERPL CREATININE-BSD FRML MDRD: >90 ML/MIN/1.73M2
GLUCOSE BLD-MCNC: 165 MG/DL (ref 70–99)
GLUCOSE BLD-MCNC: 165 MG/DL (ref 70–99)
GLUCOSE BLD-MCNC: 212 MG/DL (ref 70–99)
GLUCOSE BLDC GLUCOMTR-MCNC: 123 MG/DL (ref 70–99)
GLUCOSE BLDC GLUCOMTR-MCNC: 154 MG/DL (ref 70–99)
GLUCOSE BLDC GLUCOMTR-MCNC: 168 MG/DL (ref 70–99)
GLUCOSE BLDC GLUCOMTR-MCNC: 185 MG/DL (ref 70–99)
GLUCOSE BLDC GLUCOMTR-MCNC: 185 MG/DL (ref 70–99)
GLUCOSE BLDC GLUCOMTR-MCNC: 211 MG/DL (ref 70–99)
HCT VFR BLD AUTO: 33.7 % (ref 40–53)
HGB BLD-MCNC: 10.3 G/DL (ref 13.3–17.7)
INR PPP: 1.75 (ref 0.85–1.15)
INR PPP: 2.35 (ref 0.85–1.15)
LACTATE SERPL-SCNC: 1.8 MMOL/L (ref 0.7–2)
MAGNESIUM SERPL-MCNC: 1.8 MG/DL (ref 1.6–2.3)
MCH RBC QN AUTO: 30.7 PG (ref 26.5–33)
MCHC RBC AUTO-ENTMCNC: 30.6 G/DL (ref 31.5–36.5)
MCV RBC AUTO: 100 FL (ref 78–100)
P JIROVECII DNA SPEC QL NAA+PROBE: NOT DETECTED
PF4 HEPARIN CMPLX AB SER QL: NEGATIVE
PHOSPHATE SERPL-MCNC: 2.7 MG/DL (ref 2.5–4.5)
PLATELET # BLD AUTO: 72 10E3/UL (ref 150–450)
POTASSIUM BLD-SCNC: 3.9 MMOL/L (ref 3.4–5.3)
POTASSIUM BLD-SCNC: 4.2 MMOL/L (ref 3.4–5.3)
POTASSIUM BLD-SCNC: 4.2 MMOL/L (ref 3.4–5.3)
PROT SERPL-MCNC: 6.4 G/DL (ref 6.8–8.8)
RBC # BLD AUTO: 3.36 10E6/UL (ref 4.4–5.9)
SCANNED LAB RESULT: NORMAL
SODIUM SERPL-SCNC: 142 MMOL/L (ref 133–144)
SODIUM SERPL-SCNC: 144 MMOL/L (ref 133–144)
SODIUM SERPL-SCNC: 144 MMOL/L (ref 133–144)
TACROLIMUS BLD-MCNC: 13.9 UG/L (ref 5–15)
TME LAST DOSE: NORMAL H
TME LAST DOSE: NORMAL H
WBC # BLD AUTO: 14.5 10E3/UL (ref 4–11)

## 2021-11-26 PROCEDURE — 250N000013 HC RX MED GY IP 250 OP 250 PS 637: Performed by: ANESTHESIOLOGY

## 2021-11-26 PROCEDURE — 36592 COLLECT BLOOD FROM PICC: CPT | Performed by: NURSE PRACTITIONER

## 2021-11-26 PROCEDURE — 250N000013 HC RX MED GY IP 250 OP 250 PS 637: Performed by: STUDENT IN AN ORGANIZED HEALTH CARE EDUCATION/TRAINING PROGRAM

## 2021-11-26 PROCEDURE — 83605 ASSAY OF LACTIC ACID: CPT | Performed by: INTERNAL MEDICINE

## 2021-11-26 PROCEDURE — 94668 MNPJ CHEST WALL SBSQ: CPT

## 2021-11-26 PROCEDURE — 250N000012 HC RX MED GY IP 250 OP 636 PS 637: Performed by: NURSE PRACTITIONER

## 2021-11-26 PROCEDURE — 80053 COMPREHEN METABOLIC PANEL: CPT | Performed by: THORACIC SURGERY (CARDIOTHORACIC VASCULAR SURGERY)

## 2021-11-26 PROCEDURE — 85610 PROTHROMBIN TIME: CPT | Performed by: INTERNAL MEDICINE

## 2021-11-26 PROCEDURE — 94640 AIRWAY INHALATION TREATMENT: CPT | Mod: 76

## 2021-11-26 PROCEDURE — 83735 ASSAY OF MAGNESIUM: CPT | Performed by: THORACIC SURGERY (CARDIOTHORACIC VASCULAR SURGERY)

## 2021-11-26 PROCEDURE — 999N000157 HC STATISTIC RCP TIME EA 10 MIN

## 2021-11-26 PROCEDURE — 92611 MOTION FLUOROSCOPY/SWALLOW: CPT | Mod: GN

## 2021-11-26 PROCEDURE — 250N000012 HC RX MED GY IP 250 OP 636 PS 637: Performed by: STUDENT IN AN ORGANIZED HEALTH CARE EDUCATION/TRAINING PROGRAM

## 2021-11-26 PROCEDURE — 80048 BASIC METABOLIC PNL TOTAL CA: CPT

## 2021-11-26 PROCEDURE — 999N000043 HC STATISTIC CTO2 CONT OXYGEN TECH TIME EA 90 MIN

## 2021-11-26 PROCEDURE — 250N000011 HC RX IP 250 OP 636: Performed by: ANESTHESIOLOGY

## 2021-11-26 PROCEDURE — 250N000013 HC RX MED GY IP 250 OP 250 PS 637: Performed by: NURSE PRACTITIONER

## 2021-11-26 PROCEDURE — 250N000009 HC RX 250: Performed by: INTERNAL MEDICINE

## 2021-11-26 PROCEDURE — 250N000012 HC RX MED GY IP 250 OP 636 PS 637: Performed by: INTERNAL MEDICINE

## 2021-11-26 PROCEDURE — 99233 SBSQ HOSP IP/OBS HIGH 50: CPT | Mod: 24 | Performed by: INTERNAL MEDICINE

## 2021-11-26 PROCEDURE — 84100 ASSAY OF PHOSPHORUS: CPT | Performed by: THORACIC SURGERY (CARDIOTHORACIC VASCULAR SURGERY)

## 2021-11-26 PROCEDURE — 999N000215 HC STATISTIC HFNC ADULT NON-CPAP

## 2021-11-26 PROCEDURE — 250N000011 HC RX IP 250 OP 636: Performed by: INTERNAL MEDICINE

## 2021-11-26 PROCEDURE — 74230 X-RAY XM SWLNG FUNCJ C+: CPT

## 2021-11-26 PROCEDURE — 80197 ASSAY OF TACROLIMUS: CPT | Performed by: NURSE PRACTITIONER

## 2021-11-26 PROCEDURE — 85610 PROTHROMBIN TIME: CPT | Performed by: THORACIC SURGERY (CARDIOTHORACIC VASCULAR SURGERY)

## 2021-11-26 PROCEDURE — 99233 SBSQ HOSP IP/OBS HIGH 50: CPT | Performed by: INTERNAL MEDICINE

## 2021-11-26 PROCEDURE — 82310 ASSAY OF CALCIUM: CPT

## 2021-11-26 PROCEDURE — 85027 COMPLETE CBC AUTOMATED: CPT | Performed by: THORACIC SURGERY (CARDIOTHORACIC VASCULAR SURGERY)

## 2021-11-26 PROCEDURE — 250N000011 HC RX IP 250 OP 636: Performed by: STUDENT IN AN ORGANIZED HEALTH CARE EDUCATION/TRAINING PROGRAM

## 2021-11-26 PROCEDURE — 258N000003 HC RX IP 258 OP 636: Performed by: INTERNAL MEDICINE

## 2021-11-26 PROCEDURE — 97110 THERAPEUTIC EXERCISES: CPT | Mod: GO | Performed by: OCCUPATIONAL THERAPIST

## 2021-11-26 PROCEDURE — 120N000003 HC R&B IMCU UMMC

## 2021-11-26 PROCEDURE — 250N000013 HC RX MED GY IP 250 OP 250 PS 637

## 2021-11-26 PROCEDURE — 250N000013 HC RX MED GY IP 250 OP 250 PS 637: Performed by: THORACIC SURGERY (CARDIOTHORACIC VASCULAR SURGERY)

## 2021-11-26 PROCEDURE — 74230 X-RAY XM SWLNG FUNCJ C+: CPT | Mod: 26 | Performed by: STUDENT IN AN ORGANIZED HEALTH CARE EDUCATION/TRAINING PROGRAM

## 2021-11-26 PROCEDURE — 250N000012 HC RX MED GY IP 250 OP 636 PS 637: Performed by: PHYSICIAN ASSISTANT

## 2021-11-26 PROCEDURE — 36592 COLLECT BLOOD FROM PICC: CPT | Performed by: INTERNAL MEDICINE

## 2021-11-26 PROCEDURE — 97530 THERAPEUTIC ACTIVITIES: CPT | Mod: GP

## 2021-11-26 PROCEDURE — 94640 AIRWAY INHALATION TREATMENT: CPT

## 2021-11-26 PROCEDURE — 250N000011 HC RX IP 250 OP 636: Performed by: PHYSICIAN ASSISTANT

## 2021-11-26 PROCEDURE — 92526 ORAL FUNCTION THERAPY: CPT | Mod: GN

## 2021-11-26 RX ORDER — BARIUM SULFATE 400 MG/ML
SUSPENSION ORAL ONCE
Status: COMPLETED | OUTPATIENT
Start: 2021-11-26 | End: 2021-11-26

## 2021-11-26 RX ADMIN — HYDROXYZINE HYDROCHLORIDE 25 MG: 25 TABLET, FILM COATED ORAL at 12:24

## 2021-11-26 RX ADMIN — SODIUM CHLORIDE, PRESERVATIVE FREE 10 ML: 5 INJECTION INTRAVENOUS at 18:01

## 2021-11-26 RX ADMIN — Medication 40 MG: at 08:43

## 2021-11-26 RX ADMIN — TACROLIMUS 2.5 MG: 5 CAPSULE ORAL at 19:55

## 2021-11-26 RX ADMIN — BARIUM SULFATE 1 ML: 400 SUSPENSION ORAL at 13:45

## 2021-11-26 RX ADMIN — CALCIUM CARBONATE 600 MG (1,500 MG)-VITAMIN D3 400 UNIT TABLET 1 TABLET: at 08:44

## 2021-11-26 RX ADMIN — LEVALBUTEROL HYDROCHLORIDE 1.25 MG: 1.25 SOLUTION RESPIRATORY (INHALATION) at 19:25

## 2021-11-26 RX ADMIN — LEVOTHYROXINE SODIUM 25 MCG: 0.03 TABLET ORAL at 08:44

## 2021-11-26 RX ADMIN — MULTIVIT AND MINERALS-FERROUS GLUCONATE 9 MG IRON/15 ML ORAL LIQUID 15 ML: at 08:45

## 2021-11-26 RX ADMIN — OXYCODONE HYDROCHLORIDE 5 MG: 5 TABLET ORAL at 14:51

## 2021-11-26 RX ADMIN — INSULIN ASPART 3 UNITS: 100 INJECTION, SOLUTION INTRAVENOUS; SUBCUTANEOUS at 04:00

## 2021-11-26 RX ADMIN — Medication 5 ML: at 05:59

## 2021-11-26 RX ADMIN — ACETAMINOPHEN 975 MG: 325 TABLET, FILM COATED ORAL at 18:02

## 2021-11-26 RX ADMIN — CEFTAZIDIME 2 G: 2 INJECTION, POWDER, FOR SOLUTION INTRAVENOUS at 19:59

## 2021-11-26 RX ADMIN — ACETYLCYSTEINE 2 ML: 200 SOLUTION ORAL; RESPIRATORY (INHALATION) at 19:25

## 2021-11-26 RX ADMIN — SULFAMETHOXAZOLE AND TRIMETHOPRIM 1 TABLET: 400; 80 TABLET ORAL at 08:45

## 2021-11-26 RX ADMIN — INSULIN ASPART 2 UNITS: 100 INJECTION, SOLUTION INTRAVENOUS; SUBCUTANEOUS at 20:26

## 2021-11-26 RX ADMIN — ROSUVASTATIN CALCIUM 10 MG: 10 TABLET, FILM COATED ORAL at 08:44

## 2021-11-26 RX ADMIN — NYSTATIN 1000000 UNITS: 500000 SUSPENSION ORAL at 08:44

## 2021-11-26 RX ADMIN — ESCITALOPRAM 5 MG: 5 TABLET, FILM COATED ORAL at 08:44

## 2021-11-26 RX ADMIN — OXYCODONE HYDROCHLORIDE 5 MG: 5 TABLET ORAL at 20:01

## 2021-11-26 RX ADMIN — BARIUM SULFATE 1 ML: 400 SUSPENSION ORAL at 13:44

## 2021-11-26 RX ADMIN — FUROSEMIDE 40 MG: 10 INJECTION INTRAMUSCULAR; INTRAVENOUS at 18:01

## 2021-11-26 RX ADMIN — INSULIN ASPART 2 UNITS: 100 INJECTION, SOLUTION INTRAVENOUS; SUBCUTANEOUS at 00:07

## 2021-11-26 RX ADMIN — QUETIAPINE FUMARATE 25 MG: 25 TABLET ORAL at 08:44

## 2021-11-26 RX ADMIN — PHYTONADIONE 2 MG: 10 INJECTION, EMULSION INTRAMUSCULAR; INTRAVENOUS; SUBCUTANEOUS at 10:11

## 2021-11-26 RX ADMIN — INSULIN GLARGINE 30 UNITS: 100 INJECTION, SOLUTION SUBCUTANEOUS at 22:30

## 2021-11-26 RX ADMIN — AMLODIPINE BESYLATE 5 MG: 2.5 TABLET ORAL at 08:45

## 2021-11-26 RX ADMIN — Medication 10 MG: at 20:00

## 2021-11-26 RX ADMIN — CALCIUM CARBONATE 600 MG (1,500 MG)-VITAMIN D3 400 UNIT TABLET 1 TABLET: at 18:02

## 2021-11-26 RX ADMIN — Medication 40 MG: at 19:59

## 2021-11-26 RX ADMIN — INSULIN GLARGINE 30 UNITS: 100 INJECTION, SOLUTION SUBCUTANEOUS at 09:03

## 2021-11-26 RX ADMIN — Medication 50 ML: at 10:02

## 2021-11-26 RX ADMIN — Medication 1 TABLET: at 08:44

## 2021-11-26 RX ADMIN — QUETIAPINE FUMARATE 50 MG: 50 TABLET ORAL at 20:00

## 2021-11-26 RX ADMIN — Medication 50 ML: at 20:32

## 2021-11-26 RX ADMIN — ACETAMINOPHEN 975 MG: 325 TABLET, FILM COATED ORAL at 09:47

## 2021-11-26 RX ADMIN — NYSTATIN 1000000 UNITS: 500000 SUSPENSION ORAL at 18:02

## 2021-11-26 RX ADMIN — LEVALBUTEROL HYDROCHLORIDE 1.25 MG: 1.25 SOLUTION RESPIRATORY (INHALATION) at 09:12

## 2021-11-26 RX ADMIN — CEFTAZIDIME 2 G: 2 INJECTION, POWDER, FOR SOLUTION INTRAVENOUS at 03:53

## 2021-11-26 RX ADMIN — OXYCODONE HYDROCHLORIDE 5 MG: 5 TABLET ORAL at 09:47

## 2021-11-26 RX ADMIN — PREDNISONE 12.5 MG: 2.5 TABLET ORAL at 08:44

## 2021-11-26 RX ADMIN — NYSTATIN 1000000 UNITS: 500000 SUSPENSION ORAL at 20:00

## 2021-11-26 RX ADMIN — ACETYLCYSTEINE 2 ML: 200 SOLUTION ORAL; RESPIRATORY (INHALATION) at 09:12

## 2021-11-26 RX ADMIN — MYCOPHENOLATE MOFETIL 1000 MG: 200 POWDER, FOR SUSPENSION ORAL at 08:43

## 2021-11-26 RX ADMIN — MYCOPHENOLATE MOFETIL 1000 MG: 200 POWDER, FOR SUSPENSION ORAL at 19:59

## 2021-11-26 RX ADMIN — LEVOFLOXACIN 750 MG: 5 INJECTION, SOLUTION INTRAVENOUS at 18:02

## 2021-11-26 RX ADMIN — PREDNISONE 10 MG: 5 TABLET ORAL at 20:00

## 2021-11-26 RX ADMIN — QUETIAPINE FUMARATE 25 MG: 25 TABLET ORAL at 14:51

## 2021-11-26 RX ADMIN — TACROLIMUS 2 MG: 5 CAPSULE ORAL at 08:43

## 2021-11-26 RX ADMIN — FUROSEMIDE 40 MG: 10 INJECTION INTRAMUSCULAR; INTRAVENOUS at 08:45

## 2021-11-26 RX ADMIN — CEFTAZIDIME 2 G: 2 INJECTION, POWDER, FOR SOLUTION INTRAVENOUS at 12:48

## 2021-11-26 ASSESSMENT — ACTIVITIES OF DAILY LIVING (ADL)
ADLS_ACUITY_SCORE: 23
ADLS_ACUITY_SCORE: 25
ADLS_ACUITY_SCORE: 23
ADLS_ACUITY_SCORE: 25
ADLS_ACUITY_SCORE: 25
ADLS_ACUITY_SCORE: 23
ADLS_ACUITY_SCORE: 25
ADLS_ACUITY_SCORE: 23
ADLS_ACUITY_SCORE: 25
ADLS_ACUITY_SCORE: 23
ADLS_ACUITY_SCORE: 25
ADLS_ACUITY_SCORE: 23
ADLS_ACUITY_SCORE: 25
ADLS_ACUITY_SCORE: 25
ADLS_ACUITY_SCORE: 23
ADLS_ACUITY_SCORE: 25

## 2021-11-26 ASSESSMENT — MIFFLIN-ST. JEOR
SCORE: 1389.44
SCORE: 1392

## 2021-11-26 NOTE — PLAN OF CARE
Neuro: A&Ox4 Flat affect. Able to speak with uncapped tracheostomy.   Cardiac: SR. VSS. Goal SBP <140   Respiratory: Sating >94% on tracheal dome at 25 LPM 25% FIO2. Clear and diminished sounds  GI/: Adequate urine output. BM 2x loose.  Diet/appetite: NPO on tube feeding at 65 ml/hr  ml Q4  Activity:  Assist of 2 pivot up to chair.  Pain: Given Oxycodone for pain with relief.  Skin:  Bilateral chest incision   Blanchable skin redness on sacrum, multiple scattered bruises.  LDA's:  Tracheostomy Bivona 6  CT left posterior on -20 suction Flushed with Alteplase BID  PEJ tube - continuous feeding  PEG - draining to gravity  L 3 lumen PICC - KTO and saline locked with line filters  R PIV - saline locked    Plan:   Blood glucose Q4  On K, Mag, Phos protocol  For possible right side Thoracentesis.  Continue with POC. Notify primary team with changes.       Problem: Adult Inpatient Plan of Care  Goal: Absence of Hospital-Acquired Illness or Injury  Outcome: No Change  Intervention: Identify and Manage Fall Risk  Recent Flowsheet Documentation  Taken 11/26/2021 0000 by Lord Victoriano Schmitz, RN  Safety Promotion/Fall Prevention:    activity supervised    assistive device/personal items within reach    clutter free environment maintained    fall prevention program maintained    increase visualization of patient    nonskid shoes/slippers when out of bed    patient and family education  Intervention: Prevent Skin Injury  Recent Flowsheet Documentation  Taken 11/26/2021 0000 by Lord Victoriano Schmitz, RN  Body Position: position changed independently  Intervention: Prevent and Manage VTE (Venous Thromboembolism) Risk  Recent Flowsheet Documentation  Taken 11/26/2021 0000 by Lord Victoriano Schmitz, RN  VTE Prevention/Management:    anticoagulant therapy maintained    bleeding precautions maintained    dorsiflexion/plantar flexion performed    AROM (active range of motion) performed  Intervention: Prevent Infection  Recent  Flowsheet Documentation  Taken 11/26/2021 0000 by Lord Victoriano Schmitz, RN  Infection Prevention:    environmental surveillance performed    equipment surfaces disinfected    hand hygiene promoted    personal protective equipment utilized    rest/sleep promoted    single patient room provided    Problem: ARDS (Acute Respiratory Distress Syndrome)  Goal: Effective Oxygenation  Intervention: Optimize Oxygenation, Ventilation and Perfusion  Recent Flowsheet Documentation  Taken 11/26/2021 0000 by Lord Victoriano Schmitz, RN  Airway/Ventilation Management:    airway patency maintained    pulmonary hygiene promoted    Problem: Communication Impairment (Stroke, Ischemic/Transient Ischemic Attack)  Goal: Improved Communication Skills  Outcome: Improving  Intervention: Optimize Communication Skills  Recent Flowsheet Documentation  Taken 11/26/2021 0000 by Lord Victoriano Schmitz, RN  Communication Enhancement Strategies: call light answered in person

## 2021-11-26 NOTE — PROGRESS NOTES
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 Post-op course complicated encephalopathy, and diffuse weakness, acute to subacute by CVA, afib with RVR, BRENNAN, candidemia/candida empyema, and Code Blue due to bradycardia/asystole and hypotension for significant GIB s/p EGD with adherent clot near PEG tube site s/p clips. Transferred from ICU to medicine on 11/23/2021.     Plan for today:  Continue antibiotics (fluconazole has been stopped)  Plan for right sided therapeutic/diagnostic thoracentesis (possibly tomorrow if patient can position for bedside) vs Monday with IR  TPA/DNase in L CT  Vitamin K to reverse INR in case of thoracentesis tomorrow.   Video swallow today, will advance diet depending results.   Pulmonary toilet  Clinical psychology consult if patient wishes  PT/OT     # ILD and NSIP s/p BSLT on 10/16/2021, POD 38  # Acute hypoxic respiratory failure s/p tracheostomy on 10/29  # Bilateral pleural effusions   - Transplant pulmonology following, appreciate recs   - IS: s/p basiliximab on 10/16 and 10/20. Continue tacrolimus 2 mg QAM and 2.5 mg QPM (goal 8-12), MMF 1000 mg BID, and prednisone 12.5 mg QAM and 10 mg QPM  - Ppx: Continue bactrim 400-80 mg daily, nystatin QID x6 months  - Monthly Coccidioides Ag x12 months post-transplant per ID (urine and serum negative 11/17)   - DSA every 2 weeks   - Levalbuterol and mucomyst QID and pulm toilet and chest PT QID  - O2 via trach dome as tolerated otherwise BiPAP  for support   - Daily CXR   - Diuresis with IV lasix 40 mg BID  - Oxycodone 5 mg Q4H PRN    - PT/OT/SLP      # L lung cavitary lesion - suspect aspiration vs pseudomonal vs K pneumonia induced abscess. CT on 10/25 with LLL nodular opacity. Repeat CT chest on 11/22 with new cavitary lesion in the left lung base, and with multifocal bilateral upper lobe GGO (some of which are new), new 1.8 cm fluid collection with surrounding fat stranding in the left axilla, decreased bilateral loculated pleural effusions. Indeterminate Quant Gold, but negative tuberculin skin tests on mulitple occurences. S/p BAL on 11/22. ID feels less likely fungal.   - Transplant ID following   - Follow up histo and blastomyces serum ab, BDG fungitell and aspergillus Ag (11/22)   - Follow up BAL studies   - ID recommends stopping zosyn (11/15-11/23), start Ceftazdime 2 grams Q8H and levaquin 750 mg daily    - Follow up CT chest in 4 weeks  - Plan for diagnostic/therapeutic thoracentesis in IR, likely tomorrow vs monday     # Klebsiella pneumoniae and Pseudomonas fluorescens/putida HAP- Klebsiella initially noted trach sputum culture 11/10. Bronch culture 11/12 with Klebsiella and Pseudomonas fluorescens/putida (R-meropenem).     - Abx as above.       # Disseminated Candida - + candida BCx 10/20 and 10/22.  BDG fungitell positive (399) on 10/20. Sputum Cx + Candida albicans persistently.  TC 10/23 without evidence of endocarditis. Ophthalmology consult 10/24 with benign dilated fundoscopic exam.  Candida empyema also noted 10/25, chest tubes inadvertently removed by CVTS 10/28, left chest tube replaced by IR 11/3 as above with ongoing Candida on cultures. Repeat pleural fungal cultures and fungal/bacterial blood cultures on 11/18 NGTD. Transplant as noted above following   - L sided chest tube in place  - Follow up BDG fungitell and Aspergillus galactomannan Ag (11/22)   - Initially on Micafungin (10/22-10/27) transition to fluconazole 400 mg IV  daily (start date 10/26), with plans to re-evaluate on 11/24  - EKG for QTc monitoring (ordered for 11/27)   - ID recommends evacuate any remaining R and/or left loculated effusions      # Hypogammaglobinemia - s/p IVIG with plan to repeat IgG on 12/15     # Encephalopathy - likely multi-factorial. Multiple brain imaging with stroke as noted below, but negative for PRESS. Ammonia negative. vEEG with severe diffuse encephalopathy. LP as noted below negative for infection. Neurology previously following.   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 MRI brain on 10/23 with multifocal subacute infarct within both cerebral hemispheres and left cerebellum. Presumed embolic with afib hx.   - Anticoagulation as noted below      # Afib with hx of RVR - [id number] 4 for HTN, CVA, and DM2. First noted on 10/18, started on amiodarone drip, and converted to NSR. Metoprolol and amidoarone discontinued on 11/20 d/t intermittent bradycardia.   - Anticoagulation with warfarin as noted below  - Continue Rosuvastatin 10 mg daily      # R subclavian DVT - dx on 11/4. Resumed on heparin drip on 11/5 and transitioned to warfarin in setting of thrombocytopenia. HIT panel negative on 11/21.   - Continue warfarin per pharmacy protocol     # DM2 with steroid induced hyperglycemia - Hemoglobin A1c 6.6 pre-operatively. Endocrine recently consulted for steroids induced hyperglycemia.   - Continue Lantus 30 mg BID  - Novolog High Sliding scale insulin Q4H  - BG Q4H      # Hypernatremia - Na 146. Currently receiving 100 mL of free water via TF Q4H. Trend BMP      # Hypomagnesemia - Likely 2/2 suppressed reabsorption from CNI.   - Sliding scale replacement protocol       # Diarrhea - C. Diff negative on 11/20. Rectal tube in place on 11/22. Possibly due to tube feeds or  magnesium supplementation.   - Monitor I&O      # Malnutrition - S/p PEGJ  - MVI, folic acid, B6, B12 supplements   - TF per nutrition      ------ Chronic stable medical problems ------     # RA- Dx 5/2021 with + CCP ab and RF. Previously treated with rituximab. Rheum consulted early in admission. On steroids as noted above.   # SALTY - Uses CPAP at bedtime prior to admission. Will need to evaluate for BiPAP after decannulation   # HTN- Continue PTA amlodipine 5 mg daily. PRN labetalol and hydralazine for goal SBP <140   # Anxiety and depression - Continue PTA lexapro 5 mg daily   # Hypothyroidism - TSH 3.17 on 11/19/21. Continue PTA levothyroxine 25 mcg daily      ------ Resolved Hospital problems -------     # Recurrent GIB - hgb drop on 10/22 s/p 2 unit pRBC transfusion with EGD on 10/23 with NJ/OG tube trauma with scant oozing. Patient then developed progressive hypotension and ultimately CODE BLUE for GIB in setting of anticoagulation with heparin drip, s/p MTP. EGD on 11/2 with large amount of clotted blood in stomach and area of raised mucosa with small adherent clot near PEG tube site that was clipped, no active bleeding.  Maroon stools 11/3, repeat EGD with extensive old blood in stomach, no active bleeding, small nodular area with prior clips clipped again.  Most recent EGD 11/5 with ulcer noted at PEG tube bumper site, gastritis, and suction marks from G tube. TF noted in G tube drainage bag 11/7, AXR with GJ tube tip projecting over proximal duodenum. GJ tube exchanged 11/9 by GI.  - PO PPI BID      # Transaminitis - elevated LFTs post-op, thought to be due to shock liver      # BRENNAN - post operative BRENNAN, Cr peaked at 2.05, with renal function back at baseline with Cr at 0.7     # Recurring fevers - Fevers post-op, Tmax 101.7 POD #1.  Febrile with worsening leukocytosis again 10/25, generally persisting.  LP (10/29), xanthochromic with pleocytosis thought to be appropriate given RBC and WBCs,  no ABX recommended per transplant ID and neurology.  S/p empiric meropenem 10/28-11/2.  Now afebrile, ABX as above.     # HSV-  Chronic intermittent active infection pre-transplant with recent HSV infection: crusted lesions throughout left side of jaw, s/p 10 day treatment course of ACV through 10/9.  HSV PCR blood negative 10/17.  S/p ACV ppx as above (started POD #1 instead of POD #8 given HSV history and location).       Diet: Adult Formula Drip Feeding: Continuous Pivot 1.5; Jejunostomy; Goal Rate: 65; mL/hr; Medication - Feeding Tube Flush Frequency: At least 15-30 mL water before and after medication administration and with tube clogging; 11/22: Once current liter (V...  Regular Diet Adult    DVT Prophylaxis: Warfarin  Watson Catheter: Not present  Central Lines: None  Code Status: Full Code      Disposition Plan   Expected discharge: > 7 days recommended to prior living arrangement once antibiotic plan established.     The patient's care was discussed with the Bedside Nurse and Patient.    Vincent Koo MD  Hospitalist Service  [facility]  Securely message with the Vocera Web Console (learn more here)  Text page via [facility] Paging/Directory    Please see sign in/sign out for up to date coverage information    Clinically Significant Risk Factors Present on Admission                ______________________________________________________________________    Interval History   Patient seen and examined.  No acute overnight events.  No HA, dizziness, CP, N/V, abdominal pain or other symptoms.      Data reviewed today: I reviewed all medications, new labs and imaging results over the last 24 hours. I personally reviewed no images or EKG's today.    Physical Exam   Vital Signs: Temp: 98  F (36.7  C) Temp src: Oral BP: (!) 140/96 Pulse: 115   Resp: 28 SpO2: 92 % O2 Device: Trach dome Oxygen Delivery: 20 LPM  Weight: 142 lbs 14.4 oz  General  Appearance: NAD  Respiratory: diminished at the bases, normal work of breathing, stable secretions  Cardiovascular: tachy, regular, S1/S2 no m,r,g  GI: soft, NT, ND, +BS  Skin: no rashes  Other: No edema     Data   Recent Labs   Lab 11/26/21  1433 11/26/21  0841 11/26/21  0603 11/25/21  2129 11/25/21  1758 11/25/21  0800 11/25/21  0544 11/24/21  0758 11/24/21  0615   WBC  --   --  14.5*  --   --   --  11.9*  --  12.3*   HGB  --   --  10.3*  --   --   --  10.1*  --  10.2*   MCV  --   --  100  --   --   --  100  --  100   PLT  --   --  72*  --   --   --  66*  --  80*   INR  --   --  2.35*  --   --   --  2.48*  --  2.76*   NA  --   --  144  144  --  143  --  144  144   < > 146*  146*   POTASSIUM  --   --  4.2  4.2  --  3.8  --  4.0  4.0   < > 3.9  3.9   CHLORIDE  --   --  108  108  --  108  --  108  108   < > 111*  111*   CO2  --   --  28  28  --  29  --  28  28   < > 30  30   BUN  --   --  71*  71*  --  65*  --  67*  67*   < > 70*  70*   CR  --   --  0.82  0.82  --  0.87  --  0.82  0.82   < > 0.91  0.91   ANIONGAP  --   --  8  8  --  6  --  8  8   < > 5  5   ERIK  --   --  9.4  9.4  --  9.3  --  9.1  9.1   < > 9.5  9.5   * 123* 165*  165*   < > 137*   < > 190*  190*   < > 129*  129*   ALBUMIN  --   --  2.5*  --   --   --  2.4*  --  2.5*   PROTTOTAL  --   --  6.4*  --   --   --  6.4*  --  6.5*   BILITOTAL  --   --  0.2  --   --   --  0.2  --  0.2   ALKPHOS  --   --  141  --   --   --  137  --  137   ALT  --   --  30  --   --   --  32  --  33   AST  --   --  26  --   --   --  27  --  24    < > = values in this interval not displayed.     Recent Results (from the past 24 hour(s))   XR Video Swallow with SLP or OT    Narrative    Examination:  Modified Barium Swallow Study with Speech Pathology,  11/26/2021    Comparison: None.    History: Aspiration    Fluoroscopy time: 1.8 minutes.    Findings: Under fluoroscopic guidance, the patient was given orally  administered barium of varying  consistencies in the presence of the  speech pathology service. Thin and mildly thickened barium  administered with and without speaking valve.    The oral phase was normal. There is no delay in swallowing or pooling  of contrast. There is no deep laryngeal penetration or aspiration with  any administered consistency, including pudding and barium coated  cracker.      Impression    Impression:    1. No aspiration of any administered consistency.   2. Please see the speech pathologist report for further details.    I, SHANDRA CEVALLOS MD, attest that I was immediately available to  provide guidance and assistance during the entirety of the procedure.       I have personally reviewed the examination and initial interpretation  and I agree with the findings.    SHANDRA CEVALLOS MD         SYSTEM ID:  [id number]     Medications     dextrose Stopped (10/24/21 1008)     [Held by provider] Warfarin Therapy Reminder         acetylcysteine  2 mL Nebulization BID     alteplase (ACTIVASE) 10 mg and dornase 5 mg in NS syringe for chest tube instillation  50 mL Chest Tube BID     amLODIPine  5 mg Per G Tube Daily     calcium carbonate 600 mg-vitamin D 400 units  1 tablet Oral or Feeding Tube BID w/meals     cefTAZidime  2 g Intravenous Q8H     escitalopram  5 mg Oral or Feeding Tube Daily     folic acid-vit B6-vit B12  1 tablet Per G Tube Daily     furosemide  40 mg Intravenous BID     heparin lock flush  5-20 mL Intracatheter Q24H     insulin aspart  1-12 Units Subcutaneous Q4H     insulin glargine  30 Units Subcutaneous BID     levalbuterol  1.25 mg Nebulization BID     levofloxacin  750 mg Intravenous Q24H     levothyroxine  25 mcg Oral Daily     lidocaine  2 patch Transdermal Q24H     lidocaine   Transdermal Q8H     melatonin  10 mg Oral QPM     multivitamins w/minerals  15 mL Per Feeding Tube Daily     mycophenolate  1,000 mg Per J Tube BID     nystatin  1,000,000 Units Swish & Swallow 4x Daily     pantoprazole  40 mg Per  Feeding Tube BID     predniSONE  10 mg Per J Tube QPM     predniSONE  12.5 mg Per J Tube Daily     QUEtiapine  25 mg Oral or Feeding Tube BID     QUEtiapine  50 mg Oral At Bedtime     rosuvastatin  10 mg Oral or Feeding Tube Daily     sodium chloride (PF)  10-40 mL Intracatheter Q8H     sodium chloride (PF)  3 mL Intracatheter Q8H     sulfamethoxazole-trimethoprim  1 tablet Oral or Feeding Tube Daily     tacrolimus  2 mg Oral QAM     tacrolimus  2.5 mg Oral QPM

## 2021-11-26 NOTE — PLAN OF CARE
Major Shift Events:  Alert and oriented. Bivona 6 trach, on 25% FiO2, 25 LPM with trach dome. Able to talk over trach, tolerated speaking valve today well x1. SR. Goal SBP <140, did not have to give any additional prn BP meds today. TF infusing through J tube at 65 mL/hr, G tube to gravity drainage. Voiding via urinal. L chest tube with no output; plans to start Alteplase this evening. Declines any medications for pain. Repositioned in bed with Ax2 when allows, refused to get up in chair today stating he was exhausted. Monitoring BG q4h.     Plan: Plans for potential R side thoracentesis tomorrow. Continue with POC.  For vital signs and complete assessments, please see documentation flowsheets.

## 2021-11-26 NOTE — PHARMACY-ANTICOAGULATION SERVICE
"Pharmacy Vitamin K Note    Pharmacy was consulted to dose vitamin K for Scheduled surgery within the next 24 hours.    Assessment    Reversal INR goal: \"slightly less than 2.0\"  Patient does not have a mechanical valve.    Anticoagulation Dose History     Recent Dosing and Labs Latest Ref Rng & Units 11/20/2021 11/21/2021 11/22/2021 11/23/2021 11/24/2021 11/25/2021 11/26/2021    Warfarin 0.5 mg - - - - 0.5 mg - - -    Warfarin 1.5 mg - - 1.5 mg - - - - -    Warfarin 2 mg - - - - - 2 mg 2 mg -    Warfarin 3 mg - 3 mg - - - - - -    INR 0.85 - 1.15 2.12(H) 2.95(H) 3.47(H) 3.41(H) 2.76(H) 2.48(H) 2.35(H)        Therapeutic INR Goal: 2-3.      Bleeding Signs/Symptoms: none noted recently in chart review    Plan:  1) Recommend reversal with vitamin K at this time. Dose: 2 mg IV.  2) HOLD any further warfarin doses.   3) Recheck next INR: this evening    The primary team has been contacted about the above plan.    Karlee Pemberton, PharmD    "

## 2021-11-26 NOTE — PROGRESS NOTES
Thoracic surgery brief note     Doing well. Wants to eat. Trach site c/d/i.  Peg-j site c/d/i. GI note reviewed, buttons to be removed on 11/23. I removed the buttons at bedside.    Altagracia Allan MD  Surgery Resident PGY-3  Pg [phone]

## 2021-11-26 NOTE — PROGRESS NOTES
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 Post-op course complicated encephalopathy, and diffuse weakness, acute to subacute by CVA, afib with RVR, BRENNAN, candidemia/candida empyema, and Code Blue due to bradycardia/asystole and hypotension for significant GIB s/p EGD with adherent clot near PEG tube site s/p clips. Transferred from ICU to medicine on 11/23/2021. Pt has been followed by SLP batsheva for PMSV. Video swallow study completed per MD orders to further assess oropharyngeal swallow function.    Type of Evaluation   Type of Evaluation Swallow Evaluation   General Swallowing Observations   Swallowing Evaluation Videofluoroscopic swallow study (VFSS)   Clinical Swallow Evaluation   Feeding Assistance frequent cues/help required   VFSS Evaluation   Radiologist [facility] resident   Views Taken left lateral   VFSS Textures Trialed thin liquids;mildly thick liquids;pureed;solid foods   VFSS Eval: Thin Liquid Texture Trial   Mode of Presentation, Thin Liquid cup;spoon;straw;self-fed;fed by clinician   Order of Presentation 1, 2, 3, 4, 10 (with PMSV), 11-12 (without PMSV)   Preparatory Phase WFL   Oral Phase, Thin Liquid Premature pharyngeal entry   Pharyngeal Phase, Thin Liquid WFL   Rosenbek's Penetration Aspiration Scale: Thin Liquid Trial Results 2 - contrast enters airway, remains above the vocal cords, no residue remains (penetration)   Diagnostic Statement Thin  liquids resulted in minimal flash penetration. No aspiration or significant pharyngeal residuals.    VFSS Eval: Mildly Thick Liquids   Mode of Presentation cup;self-fed   Order of Presentation 5, 6   Preparatory Phase WFL   Oral Phase Premature pharyngeal entry   Pharyngeal Phase Residue in valleculae   Rosenbek's Penetration Aspiration Scale 2 - contrast enters airway, remains above the vocal cords, no residue remains (penetration)   Diagnostic Statement Mildly thick liquids resulted in minimal flash penetration. Pt with mild vallecular residuals which he was able to clear independently with second swallow. No aspiration.    VFSS Evaluation: Puree Solid Texture Trial   Mode of Presentation, Puree spoon;fed by clinician;self-fed   Order of Presentation 7, 8   Preparatory Phase WFL   Oral Phase, Puree WFL   Pharyngeal Phase, Puree WFL   Rosenbek's Penetration Aspiration Scale: Puree Food Trial Results 1 - no aspiration, contrast does not enter airway   Diagnostic Statement No aspiration or penetration. No significant pharyngeal residuals.    VFSS Evaluation: Solid Food Texture Trial   Mode of Presentation, Solid spoon;self-fed   Order of Presentation 9   Preparatory Phase WFL   Oral Phase, Solid WFL   Pharyngeal Phase, Solid WFL   Rosenbek's Penetration Aspiration Scale: Solid Food Trial Results 1 - no aspiration, contrast does not enter airway   Diagnostic Statement No aspiration or penetration. No significant pharyngeal residuals.    Esophageal Phase of Swallow   Patient reports or presents with symptoms of esophageal dysphagia No   Swallowing Recommendations   Diet Consistency Recommendations thin liquids (level 0);regular diet   Supervision Level for Intake close supervision needed   Mode of Delivery Recommendations bolus size, small;food moistened;slow rate of intake   Swallowing Maneuver Recommendations alternate food and liquid intake   Monitoring/Assistance Required (Eating/Swallowing) monitor for cough or  change in vocal quality with intake;optimize oral intake to minimize need for tube feeding;stop eating activities when fatigue is present;cue for finger/lingual sweep if oral pocketing present   Recommended Feeding/Eating Techniques (Swallow Eval) maintain upright sitting position for eating;maintain upright posture during/after eating for 30 minutes;minimize distractions during oral intake;place speaking valve on for intake;provide 6 smaller meals throughout day;provide oral hygiene prior to intake;set-up and prepare tray   Medication Administration Recommendations, Swallowing (SLP) as tolerated   General Therapy Interventions   Planned Therapy Interventions Dysphagia Treatment   Dysphagia treatment Compensatory strategies for swallowing;Instruction of safe swallow strategies   SLP Therapy Assessment/Plan   Criteria for Skilled Therapeutic Interventions Met (SLP Eval) yes;treatment indicated   SLP Diagnosis mild oropharyngeal dysphagia    Rehab Potential (SLP Eval) good, to achieve stated therapy goals   Therapy Frequency (SLP Eval) 5 times/wk   Predicted Duration of Therapy Intervention (SLP Eval) 2 weeks   Comment, Therapy Assessment/Plan (SLP) Video swallow study completed per MD orders. Pt presents with mild oropharyngeal dysphagia under fluroscopy. Pt's swallow function assessed with and without PMSV in place while pt on 30% FiO2 via HFTD. Pt's swallow function characterized by mildly prolonged time in oral phase, reduced hyolaryngeal elevation, and complete epiglottic inversion. Thin and mildly thick liquids resulted in minimal flash penetration. Mildly thick liquids via cup resulted in mild vallecular residuals x1, however pt was able to clear independently with extra swallow. No other aspiration or penetration across PO trials. No significant pharyngeal residuals remaining after PO trials. Recommend pt initiate regular textures and thin liquids. Pt should sit fully upright for all PO, wear PMSV for PO  intake, slow pacing, alternate between consistencies, and take small sips/bites. ST to continue to follow to assess diet tolerance and PMSV tolerance. Anticipate pt will require ST follow up at discharge.    Therapy Plan Review/Discharge Plan (SLP)   Therapy Plan Review (SLP) evaluation/treatment results reviewed;care plan/treatment goals reviewed;risks/benefits reviewed;current/potential barriers reviewed;participants voiced agreement with care plan;participants included;patient   Demonstrates Need for Referral to Another Service (SLP) clinical nutrition services/dietitian;occupational therapist;physical therapist;respiratory therapist   SLP Discharge Planning    SLP Discharge Recommendation (DC Rec) [facility] patient will benefit from intensive, interdisciplinary therapy.  Anticipate will be able to tolerate 3 hours of therapy per day;home with home care speech therapy   SLP Rationale for DC Rec pt would benefit from continued ST targeting diet tolerance and PMSV tolerance.    SLP Brief overview of current status   Recommend pt initiate regular textures and thin liquids. Pt should sit fully upright for all PO, wear PMSV for PO intake, slow pacing, alternate between consistencies, and take small sips/bites.    Total Evaluation Time   Total Evaluation Time (Minutes) 17

## 2021-11-26 NOTE — PROGRESS NOTES
Cardiovascular Surgery Progress Note  11/26/2021           Assessment and Plan:   Patient is a 57 y/o M w Hx of ILD and rheumatoid lung disease, RA, SALTY, hypothyroid, HTN, anxiety and depression, HLD, 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 Croral. Prolonged vent wean s/p trach and PEG/J tube placement with thoracic surgery 10/29.  Post-op course otherwise complicated by encephalopathy and diffuse weakness, acute to subacute CVA, afib with RVR, BRENNAN, GI bleed, Candidemia/Candida empyema, and anxiety.  Code called 11/2 for bradycardia/asystole, progressive hypotensive, found to have significant GI bleed, EGD with adherent clot near PEG tube site that was clipped.  Tolerating TD trials since 11/20 and ongoing improvement in mentation and weakness.  Patient has continued to improve and transferred to the floor 11/23.  Medicine team primary, CVTS following for incision and chest tube management.     - Currently being treated with ceftazidime, levaquin and fluconazole per transplant ID.   - All surgical drains have been removed  - CT chest 11/22 showed new cavitary lesion in the left lung base, and with multifocal bilateral upper lobe GGO (some of which are new), new 1.8 cm fluid collection with surrounding fat stranding in the left axilla, decreased bilateral loculated pleural effusions.  - Left pigtail placed 11/3 for empyema/pleural effusion  - Keeping pigtail until at least completion of antifungal, also starting lytics per pulm 11/25 for 3 days  - Other cares per medicine and pulmonary team    D/w Dr. Shayna De La Fuente PA-C  Cardiothoracic Surgery  p: [phone]          Interval History:   No acute events overnight. States pain is well managed on current regimen. No acute complaints.          Medications:       acetylcysteine  2 mL Nebulization BID     alteplase (ACTIVASE) 10 mg and dornase 5 mg in NS syringe for chest tube instillation  50 mL Chest Tube BID     amLODIPine  5 mg Per  G Tube Daily     calcium carbonate 600 mg-vitamin D 400 units  1 tablet Oral or Feeding Tube BID w/meals     cefTAZidime  2 g Intravenous Q8H     escitalopram  5 mg Oral or Feeding Tube Daily     folic acid-vit B6-vit B12  1 tablet Per G Tube Daily     furosemide  40 mg Intravenous BID     heparin lock flush  5-20 mL Intracatheter Q24H     insulin aspart  1-12 Units Subcutaneous Q4H     insulin glargine  30 Units Subcutaneous BID     levalbuterol  1.25 mg Nebulization BID     levofloxacin  750 mg Intravenous Q24H     levothyroxine  25 mcg Oral Daily     lidocaine  2 patch Transdermal Q24H     lidocaine   Transdermal Q8H     melatonin  10 mg Oral QPM     multivitamins w/minerals  15 mL Per Feeding Tube Daily     mycophenolate  1,000 mg Per J Tube BID     nystatin  1,000,000 Units Swish & Swallow 4x Daily     pantoprazole  40 mg Per Feeding Tube BID     predniSONE  10 mg Per J Tube QPM     predniSONE  12.5 mg Per J Tube Daily     QUEtiapine  25 mg Oral or Feeding Tube BID     QUEtiapine  50 mg Oral At Bedtime     rosuvastatin  10 mg Oral or Feeding Tube Daily     sodium chloride (PF)  10-40 mL Intracatheter Q8H     sodium chloride (PF)  3 mL Intracatheter Q8H     sulfamethoxazole-trimethoprim  1 tablet Oral or Feeding Tube Daily     tacrolimus  2 mg Oral QAM     tacrolimus  2.5 mg Oral QPM     acetaminophen, albuterol, bisacodyl, dextrose, glucose **OR** dextrose **OR** glucagon, heparin lock flush, hydrALAZINE, hydrOXYzine **OR** hydrOXYzine, labetalol, lidocaine (viscous), magnesium hydroxide, naloxone **OR** naloxone **OR** naloxone **OR** naloxone, ondansetron **OR** ondansetron, oxyCODONE, oxymetazoline, prochlorperazine **OR** prochlorperazine, QUEtiapine, senna-docusate, sodium chloride (PF), sodium chloride (PF), [Held by provider] Warfarin Therapy Reminder          Physical Exam:   Vitals were reviewed  Blood pressure (!) 132/94, pulse 101, temperature 98.2  F (36.8  C), temperature source Oral, resp. rate  "22, height 1.626 m (5' 4.02\"), weight 64.8 kg (142 lb 14.4 oz), SpO2 98 %.  Vitals:    11/25/21 0400 11/26/21 0400 11/26/21 1100   Weight: 61.3 kg (135 lb 2.3 oz) 65.1 kg (143 lb 8.3 oz) 64.8 kg (142 lb 14.4 oz)      I/O last 3 completed shifts:  In: 2465 [I.V.:210; NG/GT:760]  Out: 2208 [Urine:1975; Emesis/NG output:150; Chest Tube:83]    Gen: A&Ox4, NAD  Pulm:  Bibasilar crackles, no rhonchi, no wheezes. Non-labored breathing on TD.  CV: Normal S1 and S2.  RRR.  No murmur, gallop, or rub.  No peripheral edema.   Abd: NABS, soft, nontender, nondistended.   Tubes/drains: Dressing clean and dry, 110cc output serosanguinous output, no air leak.          Data:    All labs and imaging reviewed by me.  Labs:    Data   BMP  Recent Labs   Lab 11/26/21  0841 11/26/21  0603 11/26/21  0359 11/26/21  0006 11/25/21  2129 11/25/21  1758 11/25/21  0800 11/25/21  0544 11/24/21  1605 11/24/21  1547   NA  --  144  144  --   --   --  143  --  144  144  --  147*   POTASSIUM  --  4.2  4.2  --   --   --  3.8  --  4.0  4.0  --  3.9   CHLORIDE  --  108  108  --   --   --  108  --  108  108  --  112*   ERIK  --  9.4  9.4  --   --   --  9.3  --  9.1  9.1  --  9.1   CO2  --  28  28  --   --   --  29  --  28  28  --  30   BUN  --  71*  71*  --   --   --  65*  --  67*  67*  --  70*   CR  --  0.82  0.82  --   --   --  0.87  --  0.82  0.82  --  0.84   * 165*  165* 211* 185*   < > 137*   < > 190*  190*   < > 197*    < > = values in this interval not displayed.     CBC  Recent Labs   Lab 11/26/21  0603 11/25/21  0544 11/24/21  0615 11/23/21 0332   WBC 14.5* 11.9* 12.3* 11.3*   RBC 3.36* 3.31* 3.37* 3.15*   HGB 10.3* 10.1* 10.2* 9.6*   HCT 33.7* 33.2* 33.8* 30.5*    100 100 97   MCH 30.7 30.5 30.3 30.5   MCHC 30.6* 30.4* 30.2* 31.5   RDW 16.0* 16.5* 16.5* 16.7*   PLT 72* 66* 80* 79*     INR  Recent Labs   Lab 11/26/21  0603 11/25/21  0544 11/24/21  0615 11/23/21 0332   INR 2.35* 2.48* 2.76* 3.41*      Hepatic Panel "   Recent Labs   Lab 11/26/21  0603 11/25/21  0544 11/24/21  0615 11/23/21  0332   AST 26 27 24 25   ALT 30 32 33 31   ALKPHOS 141 137 137 132   BILITOTAL 0.2 0.2 0.2 0.4   ALBUMIN 2.5* 2.4* 2.5* 2.4*     Glucose  Recent Labs   Lab 11/26/21  0841 11/26/21  0603 11/26/21  0359 11/26/21  0006 11/25/21 2129 11/25/21  1758   * 165*  165* 211* 185* 114* 137*       Imaging:  Recent Results (from the past 24 hour(s))   XR Chest Port 1 View    Narrative    Portable chest    INDICATION: Chest tube disconnected    COMPARISON: 11/24/2021    Findings: Heart size normal. Clamshell sternotomy from prior bilateral  lung transplant. Tracheostomy. Left basilar chest catheter. Left PICC  tip in SVC. Scattered patchy opacities in the lungs appear decreased.      Impression    IMPRESSION: Bilateral lung transplant. Improved aeration of the lungs.    JODI MENDEZ MD         SYSTEM ID:  [id number]

## 2021-11-27 ENCOUNTER — APPOINTMENT (OUTPATIENT)
Dept: SPEECH THERAPY | Facility: CLINIC | Age: 56
End: 2021-11-27
Attending: STUDENT IN AN ORGANIZED HEALTH CARE EDUCATION/TRAINING PROGRAM
Payer: COMMERCIAL

## 2021-11-27 ENCOUNTER — APPOINTMENT (OUTPATIENT)
Dept: GENERAL RADIOLOGY | Facility: CLINIC | Age: 56
End: 2021-11-27
Attending: PEDIATRICS
Payer: COMMERCIAL

## 2021-11-27 LAB
ALBUMIN SERPL-MCNC: 2.3 G/DL (ref 3.4–5)
ALP SERPL-CCNC: 133 U/L (ref 40–150)
ALT SERPL W P-5'-P-CCNC: 27 U/L (ref 0–70)
ANION GAP SERPL CALCULATED.3IONS-SCNC: 4 MMOL/L (ref 3–14)
ANION GAP SERPL CALCULATED.3IONS-SCNC: 4 MMOL/L (ref 3–14)
ANION GAP SERPL CALCULATED.3IONS-SCNC: 7 MMOL/L (ref 3–14)
APPEARANCE FLD: ABNORMAL
AST SERPL W P-5'-P-CCNC: 22 U/L (ref 0–45)
BILIRUB SERPL-MCNC: 0.2 MG/DL (ref 0.2–1.3)
BUN SERPL-MCNC: 64 MG/DL (ref 7–30)
BUN SERPL-MCNC: 65 MG/DL (ref 7–30)
BUN SERPL-MCNC: 65 MG/DL (ref 7–30)
CALCIUM SERPL-MCNC: 9 MG/DL (ref 8.5–10.1)
CHLORIDE BLD-SCNC: 103 MMOL/L (ref 94–109)
CHLORIDE BLD-SCNC: 106 MMOL/L (ref 94–109)
CHLORIDE BLD-SCNC: 106 MMOL/L (ref 94–109)
CO2 SERPL-SCNC: 29 MMOL/L (ref 20–32)
CO2 SERPL-SCNC: 29 MMOL/L (ref 20–32)
CO2 SERPL-SCNC: 30 MMOL/L (ref 20–32)
COLOR FLD: ABNORMAL
CREAT SERPL-MCNC: 0.72 MG/DL (ref 0.66–1.25)
CREAT SERPL-MCNC: 0.72 MG/DL (ref 0.66–1.25)
CREAT SERPL-MCNC: 0.8 MG/DL (ref 0.66–1.25)
ERYTHROCYTE [DISTWIDTH] IN BLOOD BY AUTOMATED COUNT: 16 % (ref 10–15)
GFR SERPL CREATININE-BSD FRML MDRD: >90 ML/MIN/1.73M2
GLUCOSE BLD-MCNC: 152 MG/DL (ref 70–99)
GLUCOSE BLD-MCNC: 159 MG/DL (ref 70–99)
GLUCOSE BLD-MCNC: 159 MG/DL (ref 70–99)
GLUCOSE BLDC GLUCOMTR-MCNC: 110 MG/DL (ref 70–99)
GLUCOSE BLDC GLUCOMTR-MCNC: 137 MG/DL (ref 70–99)
GLUCOSE BLDC GLUCOMTR-MCNC: 164 MG/DL (ref 70–99)
GLUCOSE BLDC GLUCOMTR-MCNC: 165 MG/DL (ref 70–99)
GLUCOSE BLDC GLUCOMTR-MCNC: 184 MG/DL (ref 70–99)
GLUCOSE BLDC GLUCOMTR-MCNC: 189 MG/DL (ref 70–99)
GLUCOSE FLD-MCNC: 133 MG/DL
GRAM STAIN RESULT: NORMAL
GRAM STAIN RESULT: NORMAL
HCT VFR BLD AUTO: 32.5 % (ref 40–53)
HGB BLD-MCNC: 9.9 G/DL (ref 13.3–17.7)
INR PPP: 1.51 (ref 0.85–1.15)
LDH FLD L TO P-CCNC: 307 U/L
MAGNESIUM SERPL-MCNC: 1.5 MG/DL (ref 1.6–2.3)
MCH RBC QN AUTO: 30.4 PG (ref 26.5–33)
MCHC RBC AUTO-ENTMCNC: 30.5 G/DL (ref 31.5–36.5)
MCV RBC AUTO: 100 FL (ref 78–100)
PHOSPHATE SERPL-MCNC: 2.9 MG/DL (ref 2.5–4.5)
PLATELET # BLD AUTO: 78 10E3/UL (ref 150–450)
POTASSIUM BLD-SCNC: 4.2 MMOL/L (ref 3.4–5.3)
POTASSIUM BLD-SCNC: 4.2 MMOL/L (ref 3.4–5.3)
POTASSIUM BLD-SCNC: 4.3 MMOL/L (ref 3.4–5.3)
PROT FLD-MCNC: 4.1 G/DL
PROT SERPL-MCNC: 5.9 G/DL (ref 6.8–8.8)
RBC # BLD AUTO: 3.26 10E6/UL (ref 4.4–5.9)
SODIUM SERPL-SCNC: 139 MMOL/L (ref 133–144)
SODIUM SERPL-SCNC: 139 MMOL/L (ref 133–144)
SODIUM SERPL-SCNC: 140 MMOL/L (ref 133–144)
WBC # BLD AUTO: 14.3 10E3/UL (ref 4–11)
WBC # FLD AUTO: 3414 /UL

## 2021-11-27 PROCEDURE — 85610 PROTHROMBIN TIME: CPT | Performed by: THORACIC SURGERY (CARDIOTHORACIC VASCULAR SURGERY)

## 2021-11-27 PROCEDURE — 87205 SMEAR GRAM STAIN: CPT | Performed by: INTERNAL MEDICINE

## 2021-11-27 PROCEDURE — 80048 BASIC METABOLIC PNL TOTAL CA: CPT

## 2021-11-27 PROCEDURE — 250N000013 HC RX MED GY IP 250 OP 250 PS 637: Performed by: STUDENT IN AN ORGANIZED HEALTH CARE EDUCATION/TRAINING PROGRAM

## 2021-11-27 PROCEDURE — 99233 SBSQ HOSP IP/OBS HIGH 50: CPT | Performed by: INTERNAL MEDICINE

## 2021-11-27 PROCEDURE — 84100 ASSAY OF PHOSPHORUS: CPT | Performed by: THORACIC SURGERY (CARDIOTHORACIC VASCULAR SURGERY)

## 2021-11-27 PROCEDURE — 32555 ASPIRATE PLEURA W/ IMAGING: CPT | Performed by: PEDIATRICS

## 2021-11-27 PROCEDURE — 93005 ELECTROCARDIOGRAM TRACING: CPT

## 2021-11-27 PROCEDURE — 94640 AIRWAY INHALATION TREATMENT: CPT | Mod: 76

## 2021-11-27 PROCEDURE — 250N000012 HC RX MED GY IP 250 OP 636 PS 637: Performed by: INTERNAL MEDICINE

## 2021-11-27 PROCEDURE — 88184 FLOWCYTOMETRY/ TC 1 MARKER: CPT | Performed by: STUDENT IN AN ORGANIZED HEALTH CARE EDUCATION/TRAINING PROGRAM

## 2021-11-27 PROCEDURE — 250N000011 HC RX IP 250 OP 636: Performed by: STUDENT IN AN ORGANIZED HEALTH CARE EDUCATION/TRAINING PROGRAM

## 2021-11-27 PROCEDURE — 89051 BODY FLUID CELL COUNT: CPT | Performed by: INTERNAL MEDICINE

## 2021-11-27 PROCEDURE — 250N000013 HC RX MED GY IP 250 OP 250 PS 637

## 2021-11-27 PROCEDURE — 250N000009 HC RX 250: Performed by: INTERNAL MEDICINE

## 2021-11-27 PROCEDURE — 82945 GLUCOSE OTHER FLUID: CPT | Performed by: INTERNAL MEDICINE

## 2021-11-27 PROCEDURE — 258N000003 HC RX IP 258 OP 636: Performed by: INTERNAL MEDICINE

## 2021-11-27 PROCEDURE — 250N000013 HC RX MED GY IP 250 OP 250 PS 637: Performed by: THORACIC SURGERY (CARDIOTHORACIC VASCULAR SURGERY)

## 2021-11-27 PROCEDURE — 71045 X-RAY EXAM CHEST 1 VIEW: CPT

## 2021-11-27 PROCEDURE — 83735 ASSAY OF MAGNESIUM: CPT | Performed by: THORACIC SURGERY (CARDIOTHORACIC VASCULAR SURGERY)

## 2021-11-27 PROCEDURE — 250N000012 HC RX MED GY IP 250 OP 636 PS 637: Performed by: PHYSICIAN ASSISTANT

## 2021-11-27 PROCEDURE — 92507 TX SP LANG VOICE COMM INDIV: CPT | Mod: GN

## 2021-11-27 PROCEDURE — 250N000011 HC RX IP 250 OP 636: Performed by: ANESTHESIOLOGY

## 2021-11-27 PROCEDURE — 88189 FLOWCYTOMETRY/READ 16 & >: CPT | Performed by: STUDENT IN AN ORGANIZED HEALTH CARE EDUCATION/TRAINING PROGRAM

## 2021-11-27 PROCEDURE — 0W993ZX DRAINAGE OF RIGHT PLEURAL CAVITY, PERCUTANEOUS APPROACH, DIAGNOSTIC: ICD-10-PCS | Performed by: PEDIATRICS

## 2021-11-27 PROCEDURE — 99233 SBSQ HOSP IP/OBS HIGH 50: CPT | Mod: 24 | Performed by: INTERNAL MEDICINE

## 2021-11-27 PROCEDURE — 87075 CULTR BACTERIA EXCEPT BLOOD: CPT | Performed by: INTERNAL MEDICINE

## 2021-11-27 PROCEDURE — 999N000215 HC STATISTIC HFNC ADULT NON-CPAP

## 2021-11-27 PROCEDURE — 250N000011 HC RX IP 250 OP 636: Performed by: INTERNAL MEDICINE

## 2021-11-27 PROCEDURE — 94640 AIRWAY INHALATION TREATMENT: CPT

## 2021-11-27 PROCEDURE — 999N000043 HC STATISTIC CTO2 CONT OXYGEN TECH TIME EA 90 MIN

## 2021-11-27 PROCEDURE — 71045 X-RAY EXAM CHEST 1 VIEW: CPT | Mod: 26 | Performed by: RADIOLOGY

## 2021-11-27 PROCEDURE — 83615 LACTATE (LD) (LDH) ENZYME: CPT | Performed by: INTERNAL MEDICINE

## 2021-11-27 PROCEDURE — 88185 FLOWCYTOMETRY/TC ADD-ON: CPT | Performed by: INTERNAL MEDICINE

## 2021-11-27 PROCEDURE — 250N000013 HC RX MED GY IP 250 OP 250 PS 637: Performed by: ANESTHESIOLOGY

## 2021-11-27 PROCEDURE — 85014 HEMATOCRIT: CPT | Performed by: THORACIC SURGERY (CARDIOTHORACIC VASCULAR SURGERY)

## 2021-11-27 PROCEDURE — 36592 COLLECT BLOOD FROM PICC: CPT

## 2021-11-27 PROCEDURE — 88305 TISSUE EXAM BY PATHOLOGIST: CPT | Mod: TC | Performed by: INTERNAL MEDICINE

## 2021-11-27 PROCEDURE — 84157 ASSAY OF PROTEIN OTHER: CPT | Performed by: INTERNAL MEDICINE

## 2021-11-27 PROCEDURE — 88112 CYTOPATH CELL ENHANCE TECH: CPT | Mod: 26 | Performed by: PATHOLOGY

## 2021-11-27 PROCEDURE — 88305 TISSUE EXAM BY PATHOLOGIST: CPT | Mod: 26 | Performed by: PATHOLOGY

## 2021-11-27 PROCEDURE — 250N000011 HC RX IP 250 OP 636: Performed by: PHYSICIAN ASSISTANT

## 2021-11-27 PROCEDURE — 94668 MNPJ CHEST WALL SBSQ: CPT

## 2021-11-27 PROCEDURE — 999N000157 HC STATISTIC RCP TIME EA 10 MIN

## 2021-11-27 PROCEDURE — 120N000003 HC R&B IMCU UMMC

## 2021-11-27 PROCEDURE — 89050 BODY FLUID CELL COUNT: CPT | Performed by: INTERNAL MEDICINE

## 2021-11-27 PROCEDURE — 250N000013 HC RX MED GY IP 250 OP 250 PS 637: Performed by: NURSE PRACTITIONER

## 2021-11-27 PROCEDURE — 92526 ORAL FUNCTION THERAPY: CPT | Mod: GN

## 2021-11-27 PROCEDURE — 87102 FUNGUS ISOLATION CULTURE: CPT | Performed by: INTERNAL MEDICINE

## 2021-11-27 PROCEDURE — 87070 CULTURE OTHR SPECIMN AEROBIC: CPT | Performed by: INTERNAL MEDICINE

## 2021-11-27 PROCEDURE — 93010 ELECTROCARDIOGRAM REPORT: CPT | Performed by: INTERNAL MEDICINE

## 2021-11-27 RX ORDER — FLUCONAZOLE 2 MG/ML
400 INJECTION, SOLUTION INTRAVENOUS EVERY 24 HOURS
Status: DISCONTINUED | OUTPATIENT
Start: 2021-11-27 | End: 2021-12-13 | Stop reason: HOSPADM

## 2021-11-27 RX ORDER — LORAZEPAM 2 MG/ML
0.5 INJECTION INTRAMUSCULAR ONCE
Status: COMPLETED | OUTPATIENT
Start: 2021-11-27 | End: 2021-11-27

## 2021-11-27 RX ADMIN — INSULIN ASPART 1 UNITS: 100 INJECTION, SOLUTION INTRAVENOUS; SUBCUTANEOUS at 00:24

## 2021-11-27 RX ADMIN — Medication 40 MG: at 20:27

## 2021-11-27 RX ADMIN — ACETYLCYSTEINE 2 ML: 200 SOLUTION ORAL; RESPIRATORY (INHALATION) at 08:47

## 2021-11-27 RX ADMIN — LORAZEPAM 0.5 MG: 2 INJECTION INTRAMUSCULAR; INTRAVENOUS at 12:39

## 2021-11-27 RX ADMIN — INSULIN ASPART 2 UNITS: 100 INJECTION, SOLUTION INTRAVENOUS; SUBCUTANEOUS at 13:46

## 2021-11-27 RX ADMIN — LABETALOL HYDROCHLORIDE 20 MG: 5 INJECTION, SOLUTION INTRAVENOUS at 03:46

## 2021-11-27 RX ADMIN — FUROSEMIDE 40 MG: 10 INJECTION INTRAMUSCULAR; INTRAVENOUS at 15:48

## 2021-11-27 RX ADMIN — INSULIN GLARGINE 30 UNITS: 100 INJECTION, SOLUTION SUBCUTANEOUS at 20:26

## 2021-11-27 RX ADMIN — ACETYLCYSTEINE 2 ML: 200 SOLUTION ORAL; RESPIRATORY (INHALATION) at 20:06

## 2021-11-27 RX ADMIN — CALCIUM CARBONATE 600 MG (1,500 MG)-VITAMIN D3 400 UNIT TABLET 1 TABLET: at 08:06

## 2021-11-27 RX ADMIN — ROSUVASTATIN CALCIUM 10 MG: 10 TABLET, FILM COATED ORAL at 08:07

## 2021-11-27 RX ADMIN — LEVALBUTEROL HYDROCHLORIDE 1.25 MG: 1.25 SOLUTION RESPIRATORY (INHALATION) at 20:06

## 2021-11-27 RX ADMIN — INSULIN ASPART 2 UNITS: 100 INJECTION, SOLUTION INTRAVENOUS; SUBCUTANEOUS at 00:26

## 2021-11-27 RX ADMIN — FUROSEMIDE 40 MG: 10 INJECTION INTRAMUSCULAR; INTRAVENOUS at 08:06

## 2021-11-27 RX ADMIN — SODIUM CHLORIDE, PRESERVATIVE FREE 10 ML: 5 INJECTION INTRAVENOUS at 17:43

## 2021-11-27 RX ADMIN — QUETIAPINE FUMARATE 25 MG: 25 TABLET ORAL at 14:54

## 2021-11-27 RX ADMIN — AMLODIPINE BESYLATE 5 MG: 2.5 TABLET ORAL at 08:06

## 2021-11-27 RX ADMIN — MYCOPHENOLATE MOFETIL 1000 MG: 200 POWDER, FOR SUSPENSION ORAL at 20:27

## 2021-11-27 RX ADMIN — INSULIN ASPART 1 UNITS: 100 INJECTION, SOLUTION INTRAVENOUS; SUBCUTANEOUS at 16:04

## 2021-11-27 RX ADMIN — SULFAMETHOXAZOLE AND TRIMETHOPRIM 1 TABLET: 400; 80 TABLET ORAL at 08:07

## 2021-11-27 RX ADMIN — Medication 50 ML: at 08:37

## 2021-11-27 RX ADMIN — LEVOFLOXACIN 750 MG: 5 INJECTION, SOLUTION INTRAVENOUS at 17:37

## 2021-11-27 RX ADMIN — CEFTAZIDIME 2 G: 2 INJECTION, POWDER, FOR SOLUTION INTRAVENOUS at 03:45

## 2021-11-27 RX ADMIN — NYSTATIN 1000000 UNITS: 500000 SUSPENSION ORAL at 08:06

## 2021-11-27 RX ADMIN — MULTIVIT AND MINERALS-FERROUS GLUCONATE 9 MG IRON/15 ML ORAL LIQUID 15 ML: at 08:06

## 2021-11-27 RX ADMIN — Medication 50 ML: at 20:27

## 2021-11-27 RX ADMIN — Medication 1 TABLET: at 08:06

## 2021-11-27 RX ADMIN — Medication 40 MG: at 08:07

## 2021-11-27 RX ADMIN — INSULIN ASPART 2 UNITS: 100 INJECTION, SOLUTION INTRAVENOUS; SUBCUTANEOUS at 03:39

## 2021-11-27 RX ADMIN — CEFTAZIDIME 2 G: 2 INJECTION, POWDER, FOR SOLUTION INTRAVENOUS at 13:34

## 2021-11-27 RX ADMIN — LEVALBUTEROL HYDROCHLORIDE 1.25 MG: 1.25 SOLUTION RESPIRATORY (INHALATION) at 08:47

## 2021-11-27 RX ADMIN — FLUCONAZOLE IN SODIUM CHLORIDE 400 MG: 2 INJECTION, SOLUTION INTRAVENOUS at 10:36

## 2021-11-27 RX ADMIN — NYSTATIN 1000000 UNITS: 500000 SUSPENSION ORAL at 15:48

## 2021-11-27 RX ADMIN — QUETIAPINE FUMARATE 50 MG: 50 TABLET ORAL at 20:27

## 2021-11-27 RX ADMIN — OXYCODONE HYDROCHLORIDE 5 MG: 5 TABLET ORAL at 11:12

## 2021-11-27 RX ADMIN — ONDANSETRON 4 MG: 2 INJECTION INTRAMUSCULAR; INTRAVENOUS at 08:27

## 2021-11-27 RX ADMIN — NYSTATIN 1000000 UNITS: 500000 SUSPENSION ORAL at 20:27

## 2021-11-27 RX ADMIN — MYCOPHENOLATE MOFETIL 1000 MG: 200 POWDER, FOR SUSPENSION ORAL at 08:07

## 2021-11-27 RX ADMIN — HYDROXYZINE HYDROCHLORIDE 25 MG: 25 TABLET, FILM COATED ORAL at 11:12

## 2021-11-27 RX ADMIN — INSULIN GLARGINE 30 UNITS: 100 INJECTION, SOLUTION SUBCUTANEOUS at 08:08

## 2021-11-27 RX ADMIN — LEVOTHYROXINE SODIUM 25 MCG: 0.03 TABLET ORAL at 08:06

## 2021-11-27 RX ADMIN — Medication 5 ML: at 07:29

## 2021-11-27 RX ADMIN — QUETIAPINE FUMARATE 25 MG: 25 TABLET ORAL at 08:06

## 2021-11-27 RX ADMIN — ESCITALOPRAM 5 MG: 5 TABLET, FILM COATED ORAL at 08:07

## 2021-11-27 RX ADMIN — TACROLIMUS 2 MG: 5 CAPSULE ORAL at 08:07

## 2021-11-27 RX ADMIN — ACETAMINOPHEN 975 MG: 325 TABLET, FILM COATED ORAL at 17:46

## 2021-11-27 RX ADMIN — OXYCODONE HYDROCHLORIDE 5 MG: 5 TABLET ORAL at 20:27

## 2021-11-27 RX ADMIN — PREDNISONE 12.5 MG: 2.5 TABLET ORAL at 08:07

## 2021-11-27 RX ADMIN — NYSTATIN 1000000 UNITS: 500000 SUSPENSION ORAL at 13:47

## 2021-11-27 RX ADMIN — OXYCODONE HYDROCHLORIDE 5 MG: 5 TABLET ORAL at 08:13

## 2021-11-27 RX ADMIN — CEFTAZIDIME 2 G: 2 INJECTION, POWDER, FOR SOLUTION INTRAVENOUS at 20:27

## 2021-11-27 RX ADMIN — TACROLIMUS 2 MG: 5 CAPSULE ORAL at 17:47

## 2021-11-27 RX ADMIN — ACETAMINOPHEN 975 MG: 325 TABLET, FILM COATED ORAL at 03:45

## 2021-11-27 RX ADMIN — CALCIUM CARBONATE 600 MG (1,500 MG)-VITAMIN D3 400 UNIT TABLET 1 TABLET: at 17:46

## 2021-11-27 RX ADMIN — Medication 10 MG: at 20:27

## 2021-11-27 RX ADMIN — PREDNISONE 10 MG: 5 TABLET ORAL at 20:27

## 2021-11-27 ASSESSMENT — ACTIVITIES OF DAILY LIVING (ADL)
ADLS_ACUITY_SCORE: 23

## 2021-11-27 ASSESSMENT — MIFFLIN-ST. JEOR: SCORE: 1401.25

## 2021-11-27 NOTE — PLAN OF CARE
Major Shift Events:  Alert and oriented, flat affect. Using speaking valve to communicate when awake, tolerating well. Bivona 6 trach, on 30% FiO2, 30LPM via trach dome. SR/ST, HR 80s-100s. SBP < goal of 140. TF infusing through J tube, G tube to gravity drainage. Regular diet with PMSV on, poor intake. Monitoring BG levels q4h. Incontinent of stool at times. Voiding in adequate amounts via urinal. L chest tube to -20 H20 suction. Prn Tylenol given for pain, declined Oxycodone. Up with Ax2 and lift to chair this evening.     Plan: Continue with POC, notify team of any changes  For vital signs and complete assessments, please see documentation flowsheets.

## 2021-11-27 NOTE — CONSULTS
Procedure Note    Attending: Yanick Whalen MD   Procedure: Right Thoracentesis  Indication: Evaluation for infected pleural effusion  Risk Assessment: Higher given small pocket, INR corrected to 1.5  Pre-procedure diagnosis: Right pleural effusion  Post-procedure diagnosis: Right pleural effusion with clots    The risks and benefits of the procedure were explained to the patient who expressed understanding and opted to proceed.  Consent was obtained and placed in the chart and the patient was placed on pulse oximetry.  A time out was performed.    An ultrasound probe was used to identify an area of pleural fluid in the right hemithorax.  This area was prepped and draped in the usual sterile fashion.  8 ml of 1% lidocaine was instilled and fluid located using ultrasound guidance.  The thoracentesis catheter and needle were inserted above the rib until fluid obtained then the needle removed.    Using a one-way valve, 60 ml of orange cloudy colored fluid was removed, some clot noted, stopped draining, hypoechoic area still present. A specimen was sent for analysis.    The catheter was removed with the patient exhaling and an occlusive dressing placed.       Ultrasound revealed normal lung sliding after the procedure and a chest radiograph is pending.    The tolerated the procedure well.  Please contact the Consult and Procedure Service if any concerns or complications arise.       Yanick Whalen MD     DOS:  11/27/21

## 2021-11-27 NOTE — PROGRESS NOTES
Pulmonary Medicine  Cystic Fibrosis - Lung Transplant Team  Progress Note  2021       Patient: Edson Thornton  MRN: [id number]  : 1965 (age 56 year old)  Transplant: 10/16/2021 (Lung), POD#42  Admission date: 2021    Assessment & Plan:    Edson Thornton is a 56 year old male with a PMH significant for NSIP/ILD, bronchiectasis, moderate PH, RA, SALTY, chronic HSV infection, hypogammaglobulinemia, steroid-induced diabetes, hypothyroidism, PFO, HTN, HLD, duodenal anomaly, anxiety, and depression.  Admitted on 21 from OSH for acute on chronic respiratory failure 2/2 ILD exacerbation, now s/p BSLT on 10/16/21.  Prolonged vent wean s/p trach and PEG/J tube placement with thoracic surgery 10/29.  Post-op course otherwise complicated by encephalopathy and diffuse weakness, acute to subacute CVA, afib with RVR, BRENNAN, GI bleed, Candidemia/Candida empyema, and anxiety.  Code called  for bradycardia/asystole, progressive hypotensive, found to have significant GI bleed, EGD with adherent clot near PEG tube site that was clipped.  Tolerating TD trials since  and ongoing improvement in mentation and weakness.       Today's recommendations:  - TD with Cuff down.  - Diuresis per Primary team.  - Tacrolimus repeat level .  - Prednisone taper  not ordered.  - Coccidioides Ag on - repeat.  - Continue Zosyn for 2 week course through .  - Continue fluconazole (re-evaluate around  pending repeat fungal pleural cultures), EKG weekly for QTc monitoring (, ordered).  - Rt pleural - thoracentesis (diagnostic/therapeutic).  - Left pleural - Lytics BID for three days  - Repeat IgG (12/15, ordered).  - DSA monitoring q2 weeks, () ordered.     S/p BSLT for ILD:  Acute hypoxic respiratory failure s/p trach:   Bilateral pleural effusions: Unfortunately had not received vaccination for flu, PNA, or COVID-19 PTA.  Explant pathology with NSIP, no malignancy.  PGD 2-3.  Weaned off  paralytic 10/19 (for vent dyssynchrony) and Caroline 10/22.  Initial difficulty weaning sedation given agitation then with neurological findings as below.  Prolonged vent wean s/p trach and PEG/J tube placement with thoracic surgery 10/29.  Code called 11/2 for bradycardia/asystole (required 1 round of CPR, no medications) then progressively hypotensive, GI bleed as below.  Chest CT 11/2 with increased bilateral pleural effusions (moderate left, small right), bibasilar atelectasis with area of hypoenhancing parenchyma in LLL (suspicious for infection), and numerous nondisplaced anterior rib fractures bilaterally.  S/p left chest tube placement in IR 11/3 for pleural effusion/empyema (as below), right deferred given very little effusion on US.  Problems with trach cuff leak 11/3 (requiring multiple exchanges), bronch 11/14 with trach in good position with cuff well sealed in trachea and small to moderate secretions mostly in lower lobes. Has tolerated continuous TD 40% since 11/20.   - Nebs: levalbuterol and Mucomyst QID  - Pulmonary toilet with chest physiotherapy QID  - TD as tolerated otherwise bipap for support  - Diuresis per ICU team  - CT chest (11/22) New cavitary lesion in the left lung base, and with multifocal bilateral upper lobe GGO (some of which are new), new 1.8 cm fluid collection with surrounding fat stranding in the left axilla, decreased bilateral loculated pleural effusions.  - BAL on 11/22/21, growing Klebsiella pneumonia.  - CXR daily while chest tube in place, will review today's when completed.  11/27/2021: CXR showing Rt base opacity. Will try to Yesenia sided thoracentesis. Continue lytics on the left side on 11/25/21 in PM. It is now having bloody fluid.     Immunosuppression: s/p induction therapy with basiliximab 10/16 (and high dose IV steroid) and 10/20  - Tacrolimus decreased to 2 mg BID (suspension, via G tube) on 11/26/2021.  Goal level 8-12. Repeat level 11/22 therapeutic at 13.9, no dose  adjustment. Repeat level 11/29 (ordered).   - MMF 1000 mg BID (11/2, decreased given GI bleed, AZA to be avoided given TPMT)  - Prednisolone at 12.5 mg qAM, 10 mg qPM, next taper due 12/5 (not ordered)  Date AM dose (mg) PM dose (mg)   11/21/21 12.5 10   12/5/21 10 10   1/2/22 10 7.5   1/30/22 7.5 7.5   2/27/22 7.5 5   3/27/22 5 5   4/24/22 5 2.5      Prophylaxis:   - Bactrim for PJP ppx (held 11/2-11/5 d/t BRENNAN)  - Nystatin for oral candidiasis ppx, 6 month course  - See below for serologies and viral ppx:    Donor Recipient Intervention   CMV status Negative Negative None, CMV monthly (due 12/14, neg 11/16)   EBV status Positive Positive None, EBV monthly (due 12/14, neg 11/16)   HSV status N/A Positive S/p acyclovir POD #1-30 (recent infection history pre-txp)      ID: Concern for possible Strongyloides exposure pre-transplant s/p ivermectin x1 dose (9/17).  Donor and recipient cultures NGTD.  S/p IV ceftazidime/vancomycin for 48h per protocol and additional empiric ceftazidime 10/19-10/23 given recurrent fevers as below.  Cryptococcal Ag negative 10/23.   - Monthly Coccidioides Ag x12 months post-transplant per ID (urine and serum negative 10/17), 11/17 urine and serum neg  - Serum Histoplasma and Blastomyces (11/22) negative.    Recurring fevers, Resolved:  Fevers post-op, Tmax 101.7 POD #1.  Febrile with worsening leukocytosis again 10/25, generally persisting.  LP (10/29), xanthochromic with pleocytosis thought to be appropriate given RBC and WBCs, no ABX recommended per transplant ID and neurology.  S/p empiric meropenem 10/28-11/2.  Now afebrile, ABX as above.     Klebsiella pneumoniae:  Pseudomonas fluorescens/putida: Klebsiella initially noted trach sputum culture 11/10.  Started on ceftriaxone 11/11, transitioned to ertapenem 11/12-11/13, and back to ceftriaxone 11/14.  Bronch culture 11/12 with Klebsiella and Pseudomonas fluorescens/putida (R-meropenem).     - ABX: Zosyn (11/15 - 11/23/2021) Started on  IV ceftaz/leva (11/23 - now).      Disseminated Candida: Noted on blood cultures 10/20 and 10/22.  BDG fungitell positive (399) on 10/20.  Respiratory cultures with persistent Candida albicans.  TC 10/23 without evidence of endocarditis.  Ophthalmology consult 10/24 with benign dilated fundoscopic exam.  Candida empyema also noted 10/25, chest tubes inadvertently removed by CVTS 10/28, left chest tube replaced by IR 11/3 as above with ongoing Candida on cultures.   - BDG fungitell (11/22) still positive and neg for Aspergillus galactomannan Ag (11/22)  - Repeat pleural fungal cultures from 11/18 is positive. and the fungal/bacterial blood cultures (11/18), NGTD  - Fluconazole (10/26 per transplant ID, prior micafungin 10/22-10/27), repeat EKG for QTc monitoring 11/23 (ordered), LFTs as below (see transplant ID note 11/5).  11/26/2021: We will do thoracentesis on Rt side (Diagnostic/therapeutic) and left side lytics BID x3 days - started on 11/25/21.     HSV: Chronic intermittent active infection pre-transplant with recent HSV infection: crusted lesions throughout left side of jaw, s/p 10 day treatment course of ACV through 10/9.  HSV PCR blood negative 10/17.  S/p ACV ppx as above (started POD #1 instead of POD #8 given HSV history and location).     Hypogammaglobulinemia: IgG previously low at 364 (9/7).  Noted at 265 at time of transplant, s/p IVIG 10/21, repeat IgG (11/17) 198, s/p IVIG (with premedication) 11/18.  - Repeat IgG (12/15, ordered)     Positive cross match: Note that he received two doses of rituximab in June, which is likely contributing to cross match result.  DSA most recently negative 11/15.  - DSA monitoring q2 weeks, (11/29) ordered     SALTY: Noted pre-transplant.  Home CPAP 6-12 cm H2O.  - Evaluate need for BiPAP after decannulation.     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  MRI 10/23 with multifocal subacute infarcts within both cerebral hemispheres and left cerebellum.  DDx include surgery v embolic v infectious.  Heparin drip started 10/23 (intermittently held with GI bleed as below).  Repeat stroke code 10/25 d/t marked decrease in responsiveness with sedation wean, pupil inequality, and absent gag reflex.  CTA head without obvious new pathology, MRI brain (with and without contrast) primarily revealing for infarct, low likelihood of PRES.  Ammonia normal.  VEEG per neuro with severe diffuse encephalopathy.  Gradual improvement noted since 10/29, repeat head CT 11/2 (following code) without new acute intracranial abnormalities.  - AC management per primary team as below  - PT/OT     Afib with RVR: Noted 10/18, started on amiodarone drip and converted to SR, transitioned to PO 10/21, decreased 10/29. Metoprolol and amiodarone discontinued 11/20 d/t intermittent bradycardia. AC per primary team.     Right subclavian DVT: Seen on UE US from 11/4.  Heparin drip resumed 11/5, transitioned to warfarin 11/20 and in the setting of thrombocytopenia. HIT negative (11/21).      Recurrent GI bleed, Resolved: Hemoglobin dropped to 6.6 10/22, s/p 2 units pRBC.  OG tube with bloody output, EGD 10/23 noted NJ/OG tube trauma with scant oozing.  Progressive hypotension 11/2, hemoglobin 5.8 with bloody G tube output.  Heparin drip held, transitioned to PPI drip, and MTP activated.  EGD 11/2 with large amount of clotted blood in stomach and area of raised mucosa with small adherent clot near PEG tube site that was clipped, no active bleeding.  Maroon stools 11/3, repeat EGD with extensive old blood in stomach, no active bleeding, small nodular area with prior clips clipped again.  Most recent EGD 11/5 with ulcer noted at PEG tube bumper site,  "gastritis, and suction marks from G tube. TF noted in G tube drainage bag 11/7, AXR with GJ tube tip projecting over proximal duodenum. GJ tube exchanged 11/9 by GI.      Elevated LFTs: Shock liver post-op, now resolved.  Intermittent alk phos elevation.     Hypomagnesemia: Suppressed reabsorption 2/2 CNI.  Requiring nearly daily IV replacement.  - IV and PO magnesium supplementation      We appreciate the excellent care provided by the CVICU and CVTS teams.  Recommendations communicated via in person rounding and this note.  Will continue to follow along closely, please do not hesitate to call with any questions or concerns.         Subjective & Interval History:     Breathing comfortably on TD 25%, 25L. Minimal cough. Denies pain. No nausea, reflux, or abdominal discomfort. Loose stools continue, improving. CPT BID. Left chest tube with more blood tinged fluid after instilling lytics.    Sleep fair.    Review of Systems:     ROS as above otherwise limited due to communication barriers    Physical Exam:     Vital signs:  Temp: 98.1  F (36.7  C) Temp src: Oral BP: 123/78 Pulse: 90   Resp: 12 SpO2: 95 % O2 Device: Trach dome Oxygen Delivery: 30 LPM Height: 162.6 cm (5' 4.02\") Weight: 66 kg (145 lb 8.1 oz)  I/O:     Intake/Output Summary (Last 24 hours) at 11/26/2021 1954  Last data filed at 11/26/2021 1800  Gross per 24 hour   Intake 2865 ml   Output 1500 ml   Net 1365 ml       Constitutional: lying in bed, in no apparent distress.   HEENT: Eyes with pink conjunctivae, anicteric.  Oral mucosa moist without lesions.  Trache cdi  PULM: Fair air flow bilaterally.  Bibasilar crackles, no rhonchi, no wheezes.  Non-labored breathing on TD.  CV: Normal S1 and S2.  RRR.  No murmur, gallop, or rub.  No peripheral edema.   ABD: NABS, soft, nontender, nondistended.  PEG/J not visualized  MSK: Able to squeeze left > right hand and move BLE.  + apparent muscle wasting.   NEURO: Alert, follows commands, nonverbal communication by " head nod/shake  SKIN: Warm, dry.  No rash on limited exam.   PSYCH: Mood stable.     Lines, Drains, and Devices:  Peripheral IV 11/02/21 Anterior;Right Upper forearm (Active)   Site Assessment WDL 11/22/21 0800   Line Status Saline locked 11/22/21 0800   Dressing Intervention Dressing reinforced 11/08/21 2000   Phlebitis Scale 0-->no symptoms 11/22/21 0800   Infiltration Scale 0 11/22/21 0800   Infiltration Site Treatment Method  None 11/21/21 0400   Number of days: 20       PICC Triple Lumen 11/04/21 Left Basilic Access. PICC okay to use. (Active)   Site Assessment [facility] 11/22/21 0800   External Cath Length (cm) 1 cm 11/04/21 1747   Extremity Circumference (cm) 28 cm 11/21/21 0000   Dressing Intervention Chlorhexidine patch;Transparent 11/22/21 0800   Dressing Change Due 11/28/21 11/22/21 0800   Mosley - Status saline locked 11/22/21 0800   Mosley - Cap Change Due 11/23/21 11/22/21 0800   Red - Status infusing 11/22/21 0800   Red - Cap Change Due 11/23/21 11/22/21 0800   White - Status saline locked 11/22/21 0800   White - Cap Change Due 11/23/21 11/22/21 0800   Extravasation? No 11/17/21 2000   Line Necessity Yes, meets criteria 11/22/21 0800   Number of days: 18     Data:     LABS    CMP:   Recent Labs   Lab 11/27/21  1625 11/27/21  1604 11/27/21  1323 11/27/21  0753 11/27/21  0732 11/26/21  1808 11/26/21  1708 11/26/21  0841 11/26/21  0603 11/25/21  0800 11/25/21  0544 11/24/21  0758 11/24/21  0615     --   --   --  139  139  --  142  --  144  144   < > 144  144   < > 146*  146*   POTASSIUM 4.3  --   --   --  4.2  4.2  --  3.9  --  4.2  4.2   < > 4.0  4.0   < > 3.9  3.9   CHLORIDE 103  --   --   --  106  106  --  106  --  108  108   < > 108  108   < > 111*  111*   CO2 30  --   --   --  29  29  --  27  --  28  28   < > 28  28   < > 30  30   ANIONGAP 7  --   --   --  4  4  --  9  --  8  8   < > 8  8   < > 5  5   * 164* 184* 137* 159*  159*   < > 212*   < > 165*  165*   < > 190*  190*    < > 129*  129*   BUN 64*  --   --   --  65*  65*  --  70*  --  71*  71*   < > 67*  67*   < > 70*  70*   CR 0.80  --   --   --  0.72  0.72  --  0.83  --  0.82  0.82   < > 0.82  0.82   < > 0.91  0.91   GFRESTIMATED >90  --   --   --  >90  >90  --  >90  --  >90  >90   < > >90  >90   < > >90  >90   ERIK 9.0  --   --   --  9.0  9.0  --  9.1  --  9.4  9.4   < > 9.1  9.1   < > 9.5  9.5   MAG  --   --   --   --  1.5*  --   --   --  1.8  --  2.1  --  1.9   PHOS  --   --   --   --  2.9  --   --   --  2.7  --  2.9  --  3.0   PROTTOTAL  --   --   --   --  5.9*  --   --   --  6.4*  --  6.4*  --  6.5*   ALBUMIN  --   --   --   --  2.3*  --   --   --  2.5*  --  2.4*  --  2.5*   BILITOTAL  --   --   --   --  0.2  --   --   --  0.2  --  0.2  --  0.2   ALKPHOS  --   --   --   --  133  --   --   --  141  --  137  --  137   AST  --   --   --   --  22  --   --   --  26  --  27  --  24   ALT  --   --   --   --  27  --   --   --  30  --  32  --  33    < > = values in this interval not displayed.     CBC:   Recent Labs   Lab 11/27/21  0732 11/26/21  0603 11/25/21  0544 11/24/21  0615   WBC 14.3* 14.5* 11.9* 12.3*   RBC 3.26* 3.36* 3.31* 3.37*   HGB 9.9* 10.3* 10.1* 10.2*   HCT 32.5* 33.7* 33.2* 33.8*    100 100 100   MCH 30.4 30.7 30.5 30.3   MCHC 30.5* 30.6* 30.4* 30.2*   RDW 16.0* 16.0* 16.5* 16.5*   PLT 78* 72* 66* 80*       INR:   Recent Labs   Lab 11/27/21  0732 11/26/21  1708 11/26/21  0603 11/25/21  0544   INR 1.51* 1.75* 2.35* 2.48*       Glucose:   Recent Labs   Lab 11/27/21  1625 11/27/21  1604 11/27/21  1323 11/27/21  0753 11/27/21  0732 11/27/21  0338   * 164* 184* 137* 159*  159* 189*       Blood Gas:   Recent Labs   Lab 11/22/21  0426 11/22/21  0103 11/21/21  1029   PHV 7.37 7.41 7.38   PCO2V 43 42 49   PO2V 34 36 34   HCO3V 25 27 29*   LORI -0.4 1.9 3.1*   O2PER 40 40 40       Culture Data No results for input(s): CULT in the last 168 hours.    Virology Data:   Lab Results   Component Value  Date    FLUAH1 Negative 11/22/2021    FLUAH3 Negative 11/22/2021    [id number] Negative 11/22/2021    IFLUB Negative 11/22/2021    RSVA Negative 11/22/2021    RSVB Negative 11/22/2021    PIV1 Negative 11/22/2021    PIV2 Negative 11/22/2021    PIV3 Negative 11/22/2021    HMPV Negative 11/22/2021    HRVS Negative 11/22/2021    ADVBE Negative 11/22/2021    ADVC Negative 11/22/2021    ADVC Negative 11/12/2021    ADVC Negative 10/17/2021       Historical CMV results (last 3 of prior testing):  Lab Results   Component Value Date    CMVQNT Not Detected 11/22/2021    CMVQNT Not Detected 11/16/2021    CMVQNT Not Detected 11/12/2021     No results found for: CMVLOG    Urine Studies    Recent Labs   Lab Test 11/01/21  1336 10/25/21  1507   URINEPH 5.5 5.5   NITRITE Negative Negative   LEUKEST Negative Negative   WBCU 0 2       Most Recent [facility] Pulmonary Function Testing (FVC/FEV1 only)  No results found for: 20002  No results found for: 20003  No results found for: 20015  No results found for: 20016    IMAGING    Recent Results (from the past 48 hour(s))   XR Chest Port 1 View    Narrative    Exam: XR CHEST PORT 1 VIEW, 11/21/2021 4:28 AM    Comparison: Chest x-ray 11/20/2021    History: s/p lung transplant    Findings:  Single portable AP semiupright view of the chest is obtained.  Postoperative changes of bilateral lung transplantation, clamshell  sternotomy wires are intact. Tracheostomy tube tip is in the thoracic  trachea. Left upper extremity PICC tip terminates in the superior  cavoatrial junction. Left basilar chest tube in place.    Trachea is midline. Mediastinum is within normal limits.  Cardiopulmonary silhouette is within normal limits. Trace bilateral  effusions and associated atelectasis. No appreciable pneumothorax. The  upper abdomen is unremarkable.      Impression    Impression:   1. Slightly reduced bilateral effusions.  2. Reduced left basilar atelectasis.  3. Slightly increased right basilar  atelectasis.    I have personally reviewed the examination and initial interpretation  and I agree with the findings.    SHADNRA CEVALLOS MD         SYSTEM ID:  [id number]   CT Chest w/o Contrast    Narrative    EXAMINATION: CT CHEST W/O CONTRAST, 11/22/2021 6:02 AM    CLINICAL HISTORY: Abnormal xray - pleural effusion    COMPARISON: Radiograph 11/21/2021, 11/20/2021. CT 11/2/2021.    TECHNIQUE: CT imaging obtained through the chest without contrast.  Coronal, sagittal and axial MIP reformatted images obtained and  reviewed.     FINDINGS:    Lines, tubes, devices: Tracheostomy in place. Left upper extremity  PICC terminating at the superior cavoatrial junction. Left basilar  chest tube. Median sternotomy wires are intact.    Lungs: Post surgical changes of bilateral lung transplantation. The  central tracheobronchial tree is patent. Peripheral mucous plugging in  the bilateral lower lobes. Multifocal groundglass opacities some of  which are new compared to CT 11/2/2021. Interval development of a  cavitary lesion of the left lung base measuring 2.7 x 2.9 cm (series 6  image 190).  Decreased bilateral loculated pleural effusions with  associated compressive atelectasis.    Mediastinum: The visualized thyroid is unremarkable.Heart size is  enlarged. Small pericardial effusion. Mild coronary artery  calcifications. The thoracic aorta and main pulmonary artery are  within normal limits. Standard branching pattern of the great vessels.  No abnormal thoracic lymph nodes.    Bones and soft tissues: Subacute bilateral rib fractures. 1.2 x 1.8 cm  fluid collection in the left axilla with surrounding fat stranding  (series 2 image 27). No suspicious osseous lesion. Mild degenerative  changes of the thoracolumbar spine.    Upper abdomen:  Limited evaluation of the upper abdomen. No acute  pathology of the visualized solid organs. Percutaneous  gastrojejunostomy tube.      Impression    IMPRESSION:   1. New cavitary lesion in the  left lung base with multifocal  groundglass opacities some of which are new compared to CT 11/2/2021,  notably in the bilateral upper lobes concerning for ongoing infection.  2. Decreased bilateral loculated pleural effusions.  3. New 1.8 cm fluid collection with surrounding fat stranding in the  left axilla.  4. Similar small pericardial effusion.    I have personally reviewed the examination and initial interpretation  and I agree with the findings.    ARIES DELGADO MD         SYSTEM ID:  [id number]

## 2021-11-27 NOTE — PLAN OF CARE
"Pt afebrile, vital signs stable. Trach dome at 21% FiO2, 20L. Moderate abdominal pain. Pt hesitant on taking any pain medications so ice packs and repositioning frequently. Oxy x 2 and Tylenol x 2 given. Vitamin K given this morning. TPA given through left chest tube. Video swallow study complete. Now pt advanced to regular diet with clear liquids. Possible right sided thoracentesis tomorrow. Worked with PT/OT. Supportive wife at bedside. Continue plan of care.     BP (!) 136/103 (BP Location: Right arm)   Pulse 104   Temp 98.7  F (37.1  C) (Oral)   Resp 14   Ht 1.626 m (5' 4.02\")   Wt 64.8 kg (142 lb 14.4 oz)   SpO2 96%   BMI 24.52 kg/m      "

## 2021-11-27 NOTE — PLAN OF CARE
Neuro: A&Ox4 Flat affect. Able to speak with uncapped tracheostomy.   Cardiac: SR. VSS. Goal SBP <140     Respiratory: Sating >94% on tracheal dome at 20 LPM 21% FIO2. Clear and diminished sounds. Able to tolerate wearing speaking valve for 2 hours.  GI/: Adequate urine output, urine continent. BM x1  loose incontinent.  Diet/appetite: NPO on tube feeding at 65 ml/hr  ml Q4  Activity:  Assist of 2 pivot up to chair.  Pain: Given Oxycodone for pain with relief.  Skin:  Bilateral chest incisions. Multiple post op sites scabs on chest.  Blanchable skin redness on sacrum, multiple scattered bruises.  LDA's:  Tracheostomy Bivona 6  CT left posterior on -20 suction Flushed with Alteplase BID  PEJ tube - continuous feeding  PEG - draining to gravity  L 3 lumen PICC - KTO and saline locked with line filter.  R PIV - saline locked     Plan:   Blood glucose Q4  On K, Mag, Phos protocol  For possible right side Thoracentesis.  Continue with POC. Notify primary team with changes.

## 2021-11-27 NOTE — PLAN OF CARE
"[id number]: Slightly increase trach dome oxygen needs to 35% FiO2, 25L. HR 80s-90s, tachycardic with pain. Repositioning, ice packs, oxy and Tylenol found effective. Starting to eat with mild nausea during - IV zofran helped. 25% of breakfast this morning. Right thoracentesis done at 1245, no complications. Cultures pending. One time order of ativan helped with overall anxiety. Helpful as a daily prn? Loose/watery, incontinent stools x 2. Responding well after morning dose of lasix. Supportive wife at bedside. Continue plan of care.     /88 (BP Location: Right arm)   Pulse 96   Temp 97.8  F (36.6  C) (Oral)   Resp 8   Ht 1.626 m (5' 4.02\")   Wt 66 kg (145 lb 8.1 oz)   SpO2 93%   BMI 24.96 kg/m      "

## 2021-11-27 NOTE — PROGRESS NOTES
[facility]    Medicine Progress Note - Hospitalist Service       Date of Admission:  9/5/2021    Assessment & Plan          Edson Thornton is a 55 yo M with PMHx of NSIP/ILD 2/2 Rheumatoid lung disease, RA, bronchiectasis, moderately pulm HTN, SALTY, HTN, HLD, PLD, hypogammaglobinemia, duodenal anomaly, anxiety, and depression s/p bilateral lung transplant on 10/16/2021, with post operative course complicated by prolonged vent wean s/p trach and PEG/J tube placement with thoracic surgery on 10/29. Post-op course complicated encephalopathy, and diffuse weakness, acute to subacute by CVA, afib with RVR, BRENNAN, candidemia/candida empyema, and Code Blue due to bradycardia/asystole and hypotension for significant GIB s/p EGD with adherent clot near PEG tube site s/p clips. Transferred from ICU to medicine on 11/23/2021.     Plan for today:  Continue antibiotics including fluconazole  Pulmonary toilet  Diagnostic/therapeutic paracentesis with IM today then will restart warfarin.   PT/OT     # ILD and NSIP s/p BSLT on 10/16/2021, POD 38  # Acute hypoxic respiratory failure s/p tracheostomy on 10/29  # Bilateral pleural effusions   - Transplant pulmonology following, appreciate recs   - IS: s/p basiliximab on 10/16 and 10/20. Continue tacrolimus 2 mg QAM and 2.5 mg QPM (goal 8-12), MMF 1000 mg BID, and prednisone 12.5 mg QAM and 10 mg QPM  - Ppx: Continue bactrim 400-80 mg daily, nystatin QID x6 months  - Monthly Coccidioides Ag x12 months post-transplant per ID (urine and serum negative 11/17)   - DSA every 2 weeks   - Levalbuterol and mucomyst QID and pulm toilet and chest PT QID  - O2 via trach dome as tolerated otherwise BiPAP for support   - Daily CXR   - Diuresis with IV lasix 40 mg BID  - Oxycodone 5 mg Q4H PRN    - PT/OT/SLP      # L lung cavitary lesion - suspect aspiration vs pseudomonal vs K pneumonia induced abscess. CT on 10/25 with LLL nodular opacity. Repeat CT chest  on 11/22 with new cavitary lesion in the left lung base, and with multifocal bilateral upper lobe GGO (some of which are new), new 1.8 cm fluid collection with surrounding fat stranding in the left axilla, decreased bilateral loculated pleural effusions. Indeterminate Quant Gold, but negative tuberculin skin tests on mulitple occurences. S/p BAL on 11/22. ID feels less likely fungal.   - Transplant ID following   - Follow up histo and blastomyces serum ab, BDG fungitell and aspergillus Ag (11/22)   - Follow up BAL studies   - ID recommends stopping zosyn (11/15-11/23), start Ceftazdime 2 grams Q8H and levaquin 750 mg daily    - Follow up CT chest in 4 weeks  - Plan for diagnostic/therapeutic thoracentesis today:    - will follow up cell count, cultures, miscellaneous studies.      # Klebsiella pneumoniae and Pseudomonas fluorescens/putida HAP- Klebsiella initially noted trach sputum culture 11/10. Bronch culture 11/12 with Klebsiella and Pseudomonas fluorescens/putida (R-meropenem).     - Abx as above.       # Disseminated Candida - + candida BCx 10/20 and 10/22.  BDG fungitell positive (399) on 10/20. Sputum Cx + Candida albicans persistently.  TC 10/23 without evidence of endocarditis. Ophthalmology consult 10/24 with benign dilated fundoscopic exam.  Candida empyema also noted 10/25, chest tubes inadvertently removed by CVTS 10/28, left chest tube replaced by IR 11/3 as above with ongoing Candida on cultures. Repeat pleural fungal cultures and fungal/bacterial blood cultures on 11/18 NGTD. Transplant as noted above following   - L sided chest tube in place  - Follow up BDG fungitell and Aspergillus galactomannan Ag (11/22)   - Initially on Micafungin (10/22-10/27) transition to fluconazole 400 mg IV daily (start date 10/26), with plans to re-evaluate on 11/24 (was held for one day, then restarted.   - EKG for QTc monitoring (ordered for 11/27)   - ID recommends evacuate any remaining R and/or left loculated  effusions      # Hypogammaglobinemia - s/p IVIG with plan to repeat IgG on 12/15     # Encephalopathy - likely multi-factorial. Multiple brain imaging with stroke as noted below, but negative for PRESS. Ammonia negative. vEEG with severe diffuse encephalopathy. LP as noted below negative for infection. Neurology previously following.   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 MRI brain on 10/23 with multifocal subacute infarct within both cerebral hemispheres and left cerebellum. Presumed embolic with afib hx.   - Anticoagulation as noted below      # Afib with hx of RVR - [id number] for HTN, CVA, and DM2. First noted on 10/18, started on amiodarone drip, and converted to NSR. Metoprolol and amidoarone discontinued on 11/20 d/t intermittent bradycardia.   - Anticoagulation with warfarin as noted below  - Continue Rosuvastatin 10 mg daily      # R subclavian DVT - dx on 11/4. Resumed on heparin drip on 11/5 and transitioned to warfarin in setting of thrombocytopenia. HIT panel negative on 11/21.   - Continue warfarin per pharmacy protocol (held on 11/26 for thora) will restart.       # DM2 with steroid induced hyperglycemia - Hemoglobin A1c 6.6 pre-operatively. Endocrine recently consulted for steroids induced hyperglycemia.   - Continue Lantus 30 mg BID  - Novolog High Sliding scale insulin Q4H  - BG Q4H      # Hypernatremia - Na 146. Currently receiving 100 mL of free water via TF Q4H. Trend BMP      # Hypomagnesemia - Likely 2/2 suppressed reabsorption from CNI.   - Sliding scale replacement protocol       # Diarrhea - C. Diff negative on 11/20. Rectal tube in place on 11/22. Possibly due to tube feeds or magnesium supplementation.   - Monitor I&O      # Malnutrition - S/p PEGJ  - MVI, folic acid, B6, B12 supplements   - TF per  nutrition      ------ Chronic stable medical problems ------     # RA- Dx 5/2021 with + CCP ab and RF. Previously treated with rituximab. Rheum consulted early in admission. On steroids as noted above.   # SALTY - Uses CPAP at bedtime prior to admission. Will need to evaluate for BiPAP after decannulation   # HTN- Continue PTA amlodipine 5 mg daily. PRN labetalol and hydralazine for goal SBP <140   # Anxiety and depression - Continue PTA lexapro 5 mg daily   # Hypothyroidism - TSH 3.17 on 11/19/21. Continue PTA levothyroxine 25 mcg daily      ------ Resolved Hospital problems -------     # Recurrent GIB - hgb drop on 10/22 s/p 2 unit pRBC transfusion with EGD on 10/23 with NJ/OG tube trauma with scant oozing. Patient then developed progressive hypotension and ultimately CODE BLUE for GIB in setting of anticoagulation with heparin drip, s/p MTP. EGD on 11/2 with large amount of clotted blood in stomach and area of raised mucosa with small adherent clot near PEG tube site that was clipped, no active bleeding.  Maroon stools 11/3, repeat EGD with extensive old blood in stomach, no active bleeding, small nodular area with prior clips clipped again.  Most recent EGD 11/5 with ulcer noted at PEG tube bumper site, gastritis, and suction marks from G tube. TF noted in G tube drainage bag 11/7, AXR with GJ tube tip projecting over proximal duodenum. GJ tube exchanged 11/9 by GI.  - PO PPI BID      # Transaminitis - elevated LFTs post-op, thought to be due to shock liver      # BRENNAN - post operative BRENNAN, Cr peaked at 2.05, with renal function back at baseline with Cr at 0.7     # Recurring fevers - Fevers post-op, Tmax 101.7 POD #1.  Febrile with worsening leukocytosis again 10/25, generally persisting.  LP (10/29), xanthochromic with pleocytosis thought to be appropriate given RBC and WBCs, no ABX recommended per transplant ID and neurology.  S/p empiric meropenem 10/28-11/2.  Now afebrile, ABX as above.     # HSV-  Chronic  intermittent active infection pre-transplant with recent HSV infection: crusted lesions throughout left side of jaw, s/p 10 day treatment course of ACV through 10/9.  HSV PCR blood negative 10/17.  S/p ACV ppx as above (started POD #1 instead of POD #8 given HSV history and location).       Diet: Adult Formula Drip Feeding: Continuous Pivot 1.5; Jejunostomy; Goal Rate: 65; mL/hr; Medication - Feeding Tube Flush Frequency: At least 15-30 mL water before and after medication administration and with tube clogging; 11/22: Once current liter (V...  Regular Diet Adult    DVT Prophylaxis: Warfarin  Watson Catheter: Not present  Central Lines: None  Code Status: Full Code      Disposition Plan   Expected discharge:  > 7 days recommended to transitional care unit once antibiotic plan established.     The patient's care was discussed with the Bedside Nurse, Patient and Pulm Consultant.    Vincent Koo MD  Hospitalist Service  [facility]  Securely message with the Vocera Web Console (learn more here)  Text page via [facility] Paging/Directory    Please see sign in/sign out for up to date coverage information    Clinically Significant Risk Factors Present on Admission                ______________________________________________________________________    Interval History   Patient seen and examined, overall feeling okay; however became nauseated eating after taking his medications.  Otherwise feels his breathing is well.  Denies HA, dizziness, CP, abdominal pain or other symptoms currently.      Data reviewed today: I reviewed all medications, new labs and imaging results over the last 24 hours. I personally reviewed no images or EKG's today.    Physical Exam   Vital Signs: Temp: 97.8  F (36.6  C) Temp src: Oral BP: 120/88 Pulse: 96   Resp: 8 SpO2: 93 % O2 Device: Trach dome Oxygen Delivery: 25 LPM  Weight: 145 lbs 8.06 oz  General Appearance: NAD, appears more energetic  today  Respiratory: coarse throughout, diminished at the bases.   Cardiovascular: RRR S1/S2, no m,r,g  GI: soft, NT, ND, +BS  Skin: no rashes  Other: No edema     Data   Recent Labs   Lab 11/27/21  0753 11/27/21  0732 11/27/21  0338 11/26/21  1808 11/26/21  1708 11/26/21  0841 11/26/21  0603 11/25/21  0800 11/25/21  0544   WBC  --  14.3*  --   --   --   --  14.5*  --  11.9*   HGB  --  9.9*  --   --   --   --  10.3*  --  10.1*   MCV  --  100  --   --   --   --  100  --  100   PLT  --  78*  --   --   --   --  72*  --  66*   INR  --  1.51*  --   --  1.75*  --  2.35*  --  2.48*   NA  --  139  139  --   --  142  --  144  144   < > 144  144   POTASSIUM  --  4.2  4.2  --   --  3.9  --  4.2  4.2   < > 4.0  4.0   CHLORIDE  --  106  106  --   --  106  --  108  108   < > 108  108   CO2  --  29  29  --   --  27  --  28  28   < > 28  28   BUN  --  65*  65*  --   --  70*  --  71*  71*   < > 67*  67*   CR  --  0.72  0.72  --   --  0.83  --  0.82  0.82   < > 0.82  0.82   ANIONGAP  --  4  4  --   --  9  --  8  8   < > 8  8   ERIK  --  9.0  9.0  --   --  9.1  --  9.4  9.4   < > 9.1  9.1   * 159*  159* 189*   < > 212*   < > 165*  165*   < > 190*  190*   ALBUMIN  --  2.3*  --   --   --   --  2.5*  --  2.4*   PROTTOTAL  --  5.9*  --   --   --   --  6.4*  --  6.4*   BILITOTAL  --  0.2  --   --   --   --  0.2  --  0.2   ALKPHOS  --  133  --   --   --   --  141  --  137   ALT  --  27  --   --   --   --  30  --  32   AST  --  22  --   --   --   --  26  --  27    < > = values in this interval not displayed.     Recent Results (from the past 24 hour(s))   XR Video Swallow with SLP or OT    Narrative    Examination:  Modified Barium Swallow Study with Speech Pathology,  11/26/2021    Comparison: None.    History: Aspiration    Fluoroscopy time: 1.8 minutes.    Findings: Under fluoroscopic guidance, the patient was given orally  administered barium of varying consistencies in the presence of the  speech  pathology service. Thin and mildly thickened barium  administered with and without speaking valve.    The oral phase was normal. There is no delay in swallowing or pooling  of contrast. There is no deep laryngeal penetration or aspiration with  any administered consistency, including pudding and barium coated  cracker.      Impression    Impression:    1. No aspiration of any administered consistency.   2. Please see the speech pathologist report for further details.    I, SHANDRA CEVALLOS MD, attest that I was immediately available to  provide guidance and assistance during the entirety of the procedure.       I have personally reviewed the examination and initial interpretation  and I agree with the findings.    SHANDRA CEVALLOS MD         SYSTEM ID:  [id number]     Medications     dextrose Stopped (10/24/21 1008)     [Held by provider] Warfarin Therapy Reminder         acetylcysteine  2 mL Nebulization BID     alteplase (ACTIVASE) 10 mg and dornase 5 mg in NS syringe for chest tube instillation  50 mL Chest Tube BID     amLODIPine  5 mg Per G Tube Daily     calcium carbonate 600 mg-vitamin D 400 units  1 tablet Oral or Feeding Tube BID w/meals     cefTAZidime  2 g Intravenous Q8H     escitalopram  5 mg Oral or Feeding Tube Daily     fluconazole  400 mg Intravenous Q24H     folic acid-vit B6-vit B12  1 tablet Per G Tube Daily     furosemide  40 mg Intravenous BID     heparin lock flush  5-20 mL Intracatheter Q24H     insulin aspart  1-12 Units Subcutaneous Q4H     insulin glargine  30 Units Subcutaneous BID     levalbuterol  1.25 mg Nebulization BID     levofloxacin  750 mg Intravenous Q24H     levothyroxine  25 mcg Oral Daily     lidocaine  2 patch Transdermal Q24H     lidocaine   Transdermal Q8H     melatonin  10 mg Oral QPM     multivitamins w/minerals  15 mL Per Feeding Tube Daily     mycophenolate  1,000 mg Per J Tube BID     nystatin  1,000,000 Units Swish & Swallow 4x Daily     pantoprazole  40 mg Per Feeding  Tube BID     predniSONE  10 mg Per J Tube QPM     predniSONE  12.5 mg Per J Tube Daily     QUEtiapine  25 mg Oral or Feeding Tube BID     QUEtiapine  50 mg Oral At Bedtime     rosuvastatin  10 mg Oral or Feeding Tube Daily     sodium chloride (PF)  10-40 mL Intracatheter Q8H     sodium chloride (PF)  3 mL Intracatheter Q8H     sulfamethoxazole-trimethoprim  1 tablet Oral or Feeding Tube Daily     tacrolimus  2 mg Oral QPM     tacrolimus  2 mg Oral QAM

## 2021-11-27 NOTE — PROGRESS NOTES
Pulmonary Medicine  Cystic Fibrosis - Lung Transplant Team  Progress Note  2021       Patient: Edson Thornton  MRN: [id number]  : 1965 (age 56 year old)  Transplant: 10/16/2021 (Lung), POD#41  Admission date: 2021    Assessment & Plan:    Edson Thornton is a 56 year old male with a PMH significant for NSIP/ILD, bronchiectasis, moderate PH, RA, SALTY, chronic HSV infection, hypogammaglobulinemia, steroid-induced diabetes, hypothyroidism, PFO, HTN, HLD, duodenal anomaly, anxiety, and depression.  Admitted on 21 from OSH for acute on chronic respiratory failure 2/2 ILD exacerbation, now s/p BSLT on 10/16/21.  Prolonged vent wean s/p trach and PEG/J tube placement with thoracic surgery 10/29.  Post-op course otherwise complicated by encephalopathy and diffuse weakness, acute to subacute CVA, afib with RVR, BRENNAN, GI bleed, Candidemia/Candida empyema, and anxiety.  Code called  for bradycardia/asystole, progressive hypotensive, found to have significant GI bleed, EGD with adherent clot near PEG tube site that was clipped.  Tolerating TD trials since  and ongoing improvement in mentation and weakness.       Today's recommendations:  - TD with Cuff down.  - Diuresis per Primary team.  - Tacrolimus repeat level . It is a little high at 13.5  - Prednisone taper  not ordered.  - Coccidioides Ag on - repeat.  - Continue Zosyn for 2 week course through .  - Continue fluconazole (re-evaluate around  pending repeat fungal pleural cultures), EKG weekly for QTc monitoring (, ordered).  - Rt pleural - thoracentesis (diagnostic/therapeutic).  - Left pleural - Lytics BID for three days (ordered).  - Repeat IgG (12/15, ordered).  - DSA monitoring q2 weeks, () ordered.     S/p BSLT for ILD:  Acute hypoxic respiratory failure s/p trach:   Bilateral pleural effusions: Unfortunately had not received vaccination for flu, PNA, or COVID-19 PTA.  Explant pathology with NSIP, no  malignancy.  PGD 2-3.  Weaned off paralytic 10/19 (for vent dyssynchrony) and Caroline 10/22.  Initial difficulty weaning sedation given agitation then with neurological findings as below.  Prolonged vent wean s/p trach and PEG/J tube placement with thoracic surgery 10/29.  Code called 11/2 for bradycardia/asystole (required 1 round of CPR, no medications) then progressively hypotensive, GI bleed as below.  Chest CT 11/2 with increased bilateral pleural effusions (moderate left, small right), bibasilar atelectasis with area of hypoenhancing parenchyma in LLL (suspicious for infection), and numerous nondisplaced anterior rib fractures bilaterally.  S/p left chest tube placement in IR 11/3 for pleural effusion/empyema (as below), right deferred given very little effusion on US.  Problems with trach cuff leak 11/3 (requiring multiple exchanges), bronch 11/14 with trach in good position with cuff well sealed in trachea and small to moderate secretions mostly in lower lobes. Has tolerated continuous TD 40% since 11/20.   - Nebs: levalbuterol and Mucomyst QID  - Pulmonary toilet with chest physiotherapy QID  - TD as tolerated otherwise bipap for support  - Diuresis per ICU team  - CT chest (11/22) New cavitary lesion in the left lung base, and with multifocal bilateral upper lobe GGO (some of which are new), new 1.8 cm fluid collection with surrounding fat stranding in the left axilla, decreased bilateral loculated pleural effusions.  - BAL on 11/22/21, growing Klebsiella pneumonia.  - CXR daily while chest tube in place, will review today's when completed.  11/26/2021: CXR showing Rt base opacity, Will do Rt sided thoracentesis and started lytics on the left side on 11/25/21 in PM. It is now having bloody fluid.     Immunosuppression: s/p induction therapy with basiliximab 10/16 (and high dose IV steroid) and 10/20  - Tacrolimus decreased to 2 mg BID (suspension, via G tube) on 11/26/2021.  Goal level 8-12. Repeat level 11/22  therapeutic at 13.9, no dose adjustment. Repeat level 11/29 (ordered). ADvised to   - MMF 1000 mg BID (11/2, decreased given GI bleed, AZA to be avoided given TPMT)  - Prednisolone at 12.5 mg qAM, 10 mg qPM, next taper due 12/5 (not ordered)  Date AM dose (mg) PM dose (mg)   11/21/21 12.5 10   12/5/21 10 10   1/2/22 10 7.5   1/30/22 7.5 7.5   2/27/22 7.5 5   3/27/22 5 5   4/24/22 5 2.5      Prophylaxis:   - Bactrim for PJP ppx (held 11/2-11/5 d/t BRENNAN)  - Nystatin for oral candidiasis ppx, 6 month course  - See below for serologies and viral ppx:    Donor Recipient Intervention   CMV status Negative Negative None, CMV monthly (due 12/14, neg 11/16)   EBV status Positive Positive None, EBV monthly (due 12/14, neg 11/16)   HSV status N/A Positive S/p acyclovir POD #1-30 (recent infection history pre-txp)      ID: Concern for possible Strongyloides exposure pre-transplant s/p ivermectin x1 dose (9/17).  Donor and recipient cultures NGTD.  S/p IV ceftazidime/vancomycin for 48h per protocol and additional empiric ceftazidime 10/19-10/23 given recurrent fevers as below.  Cryptococcal Ag negative 10/23.   - Monthly Coccidioides Ag x12 months post-transplant per ID (urine and serum negative 10/17), 11/17 urine and serum neg  - Serum Histoplasma and Blastomyces (11/22) negative.    Recurring fevers, Resolved:  Fevers post-op, Tmax 101.7 POD #1.  Febrile with worsening leukocytosis again 10/25, generally persisting.  LP (10/29), xanthochromic with pleocytosis thought to be appropriate given RBC and WBCs, no ABX recommended per transplant ID and neurology.  S/p empiric meropenem 10/28-11/2.  Now afebrile, ABX as above.     Klebsiella pneumoniae:  Pseudomonas fluorescens/putida: Klebsiella initially noted trach sputum culture 11/10.  Started on ceftriaxone 11/11, transitioned to ertapenem 11/12-11/13, and back to ceftriaxone 11/14.  Bronch culture 11/12 with Klebsiella and Pseudomonas fluorescens/putida (R-meropenem).     - ABX:  Zosyn (11/15 - 11/23/2021) Started on IV ceftaz/leva (11/23 - now).      Disseminated Candida: Noted on blood cultures 10/20 and 10/22.  BDG fungitell positive (399) on 10/20.  Respiratory cultures with persistent Candida albicans.  TC 10/23 without evidence of endocarditis.  Ophthalmology consult 10/24 with benign dilated fundoscopic exam.  Candida empyema also noted 10/25, chest tubes inadvertently removed by CVTS 10/28, left chest tube replaced by IR 11/3 as above with ongoing Candida on cultures.   - BDG fungitell (11/22) still positive and neg for Aspergillus galactomannan Ag (11/22)  - Repeat pleural fungal cultures from 11/18 is positive. and the fungal/bacterial blood cultures (11/18), NGTD  - Fluconazole (10/26 per transplant ID, prior micafungin 10/22-10/27), repeat EKG for QTc monitoring 11/23 (ordered), LFTs as below (see transplant ID note 11/5).  11/26/2021: We will do thoracentesis on Rt side (Diagnostic/therapeutic) and left side lytics BID x3 days - started on 11/25/21.     HSV: Chronic intermittent active infection pre-transplant with recent HSV infection: crusted lesions throughout left side of jaw, s/p 10 day treatment course of ACV through 10/9.  HSV PCR blood negative 10/17.  S/p ACV ppx as above (started POD #1 instead of POD #8 given HSV history and location).     Hypogammaglobulinemia: IgG previously low at 364 (9/7).  Noted at 265 at time of transplant, s/p IVIG 10/21, repeat IgG (11/17) 198, s/p IVIG (with premedication) 11/18.  - Repeat IgG (12/15, ordered)     Positive cross match: Note that he received two doses of rituximab in June, which is likely contributing to cross match result.  DSA most recently negative 11/15.  - DSA monitoring q2 weeks, (11/29) ordered     SALTY: Noted pre-transplant.  Home CPAP 6-12 cm H2O.  - Evaluate need for BiPAP after decannulation.     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  MRI 10/23 with multifocal subacute infarcts within both cerebral hemispheres and left cerebellum.  DDx include surgery v embolic v infectious.  Heparin drip started 10/23 (intermittently held with GI bleed as below).  Repeat stroke code 10/25 d/t marked decrease in responsiveness with sedation wean, pupil inequality, and absent gag reflex.  CTA head without obvious new pathology, MRI brain (with and without contrast) primarily revealing for infarct, low likelihood of PRES.  Ammonia normal.  VEEG per neuro with severe diffuse encephalopathy.  Gradual improvement noted since 10/29, repeat head CT 11/2 (following code) without new acute intracranial abnormalities.  - AC management per primary team as below  - PT/OT     Afib with RVR: Noted 10/18, started on amiodarone drip and converted to SR, transitioned to PO 10/21, decreased 10/29. Metoprolol and amiodarone discontinued 11/20 d/t intermittent bradycardia. AC per primary team.     Right subclavian DVT: Seen on UE US from 11/4.  Heparin drip resumed 11/5, transitioned to warfarin 11/20 and in the setting of thrombocytopenia. HIT negative (11/21).      Recurrent GI bleed, Resolved: Hemoglobin dropped to 6.6 10/22, s/p 2 units pRBC.  OG tube with bloody output, EGD 10/23 noted NJ/OG tube trauma with scant oozing.  Progressive hypotension 11/2, hemoglobin 5.8 with bloody G tube output.  Heparin drip held, transitioned to PPI drip, and MTP activated.  EGD 11/2 with large amount of clotted blood in stomach and area of raised mucosa with small adherent clot near PEG tube site that was clipped, no active bleeding.  Maroon stools 11/3, repeat EGD with extensive old blood in stomach, no active bleeding, small nodular area with prior clips clipped again.  Most recent EGD 11/5 with  "ulcer noted at PEG tube bumper site, gastritis, and suction marks from G tube. TF noted in G tube drainage bag 11/7, AXR with GJ tube tip projecting over proximal duodenum. GJ tube exchanged 11/9 by GI.      Elevated LFTs: Shock liver post-op, now resolved.  Intermittent alk phos elevation.     Hypomagnesemia: Suppressed reabsorption 2/2 CNI.  Requiring nearly daily IV replacement.  - IV and PO magnesium supplementation      We appreciate the excellent care provided by the CVICU and CVTS teams.  Recommendations communicated via in person rounding and this note.  Will continue to follow along closely, please do not hesitate to call with any questions or concerns.         Subjective & Interval History:     Breathing comfortably on TD 25%, 25L. Minimal cough. Denies pain. No nausea, reflux, or abdominal discomfort. Loose stools continue, improving. CPT BID. Left chest tube with more blood tinged fluid after instilling lytics.    Sleep fair.    Review of Systems:     ROS as above otherwise limited due to communication barriers    Physical Exam:     Vital signs:  Temp: 98.7  F (37.1  C) Temp src: Oral BP: (!) 136/103 Pulse: 104   Resp: 14 SpO2: 97 % O2 Device: Trach dome Oxygen Delivery: 20 LPM Height: 162.6 cm (5' 4.02\") Weight: 64.8 kg (142 lb 14.4 oz)  I/O:     Intake/Output Summary (Last 24 hours) at 11/26/2021 1954  Last data filed at 11/26/2021 1800  Gross per 24 hour   Intake 2865 ml   Output 1500 ml   Net 1365 ml       Constitutional: lying in bed, in no apparent distress.   HEENT: Eyes with pink conjunctivae, anicteric.  Oral mucosa moist without lesions.  Trache cdi  PULM: Fair air flow bilaterally.  Bibasilar crackles, no rhonchi, no wheezes.  Non-labored breathing on TD.  CV: Normal S1 and S2.  RRR.  No murmur, gallop, or rub.  No peripheral edema.   ABD: NABS, soft, nontender, nondistended.  PEG/J not visualized  MSK: Able to squeeze left > right hand and move BLE.  + apparent muscle wasting.   NEURO: Alert, " follows commands, nonverbal communication by head nod/shake  SKIN: Warm, dry.  No rash on limited exam.   PSYCH: Mood stable.     Lines, Drains, and Devices:  Peripheral IV 11/02/21 Anterior;Right Upper forearm (Active)   Site Assessment WDL 11/22/21 0800   Line Status Saline locked 11/22/21 0800   Dressing Intervention Dressing reinforced 11/08/21 2000   Phlebitis Scale 0-->no symptoms 11/22/21 0800   Infiltration Scale 0 11/22/21 0800   Infiltration Site Treatment Method  None 11/21/21 0400   Number of days: 20       PICC Triple Lumen 11/04/21 Left Basilic Access. PICC okay to use. (Active)   Site Assessment WDL 11/22/21 0800   External Cath Length (cm) 1 cm 11/04/21 1747   Extremity Circumference (cm) 28 cm 11/21/21 0000   Dressing Intervention Chlorhexidine patch;Transparent 11/22/21 0800   Dressing Change Due 11/28/21 11/22/21 0800   Mosley - Status saline locked 11/22/21 0800   Mosley - Cap Change Due 11/23/21 11/22/21 0800   Red - Status infusing 11/22/21 0800   Red - Cap Change Due 11/23/21 11/22/21 0800   White - Status saline locked 11/22/21 0800   White - Cap Change Due 11/23/21 11/22/21 0800   Extravasation? No 11/17/21 2000   Line Necessity Yes, meets criteria 11/22/21 0800   Number of days: 18     Data:     LABS    CMP:   Recent Labs   Lab 11/26/21  1808 11/26/21  1708 11/26/21  1433 11/26/21  0841 11/26/21  0603 11/25/21  2129 11/25/21  1758 11/25/21  0800 11/25/21  0544 11/24/21  0758 11/24/21  0615 11/23/21  0337 11/23/21  0332   NA  --  142  --   --  144  144  --  143  --  144  144   < > 146*  146*   < > 146*  146*   POTASSIUM  --  3.9  --   --  4.2  4.2  --  3.8  --  4.0  4.0   < > 3.9  3.9   < > 4.5  4.5   CHLORIDE  --  106  --   --  108  108  --  108  --  108  108   < > 111*  111*   < > 112*  112*   CO2  --  27  --   --  28  28  --  29  --  28  28   < > 30  30   < > 30  30   ANIONGAP  --  9  --   --  8  8  --  6  --  8  8   < > 5  5   < > 4  4   * 212* 154* 123* 165*   165*   < > 137*   < > 190*  190*   < > 129*  129*   < > 195*  195*   BUN  --  70*  --   --  71*  71*  --  65*  --  67*  67*   < > 70*  70*   < > 56*  56*   CR  --  0.83  --   --  0.82  0.82  --  0.87  --  0.82  0.82   < > 0.91  0.91   < > 0.84  0.84   GFRESTIMATED  --  >90  --   --  >90  >90  --  >90  --  >90  >90   < > >90  >90   < > >90  >90   ERIK  --  9.1  --   --  9.4  9.4  --  9.3  --  9.1  9.1   < > 9.5  9.5   < > 9.1  9.1   MAG  --   --   --   --  1.8  --   --   --  2.1  --  1.9  --  2.1   PHOS  --   --   --   --  2.7  --   --   --  2.9  --  3.0  --  4.1   PROTTOTAL  --   --   --   --  6.4*  --   --   --  6.4*  --  6.5*  --  6.2*   ALBUMIN  --   --   --   --  2.5*  --   --   --  2.4*  --  2.5*  --  2.4*   BILITOTAL  --   --   --   --  0.2  --   --   --  0.2  --  0.2  --  0.4   ALKPHOS  --   --   --   --  141  --   --   --  137  --  137  --  132   AST  --   --   --   --  26  --   --   --  27  --  24  --  25   ALT  --   --   --   --  30  --   --   --  32  --  33  --  31    < > = values in this interval not displayed.     CBC:   Recent Labs   Lab 11/26/21  0603 11/25/21  0544 11/24/21  0615 11/23/21  0332   WBC 14.5* 11.9* 12.3* 11.3*   RBC 3.36* 3.31* 3.37* 3.15*   HGB 10.3* 10.1* 10.2* 9.6*   HCT 33.7* 33.2* 33.8* 30.5*    100 100 97   MCH 30.7 30.5 30.3 30.5   MCHC 30.6* 30.4* 30.2* 31.5   RDW 16.0* 16.5* 16.5* 16.7*   PLT 72* 66* 80* 79*       INR:   Recent Labs   Lab 11/26/21  1708 11/26/21  0603 11/25/21  0544 11/24/21  0615   INR 1.75* 2.35* 2.48* 2.76*       Glucose:   Recent Labs   Lab 11/26/21  1808 11/26/21  1708 11/26/21  1433 11/26/21  0841 11/26/21  0603 11/26/21  0359   * 212* 154* 123* 165*  165* 211*       Blood Gas:   Recent Labs   Lab 11/22/21  0426 11/22/21  0103 11/21/21  1029   PHV 7.37 7.41 7.38   PCO2V 43 42 49   PO2V 34 36 34   HCO3V 25 27 29*   LORI -0.4 1.9 3.1*   O2PER 40 40 40       Culture Data No results for input(s): CULT in the last 168  hours.    Virology Data:   Lab Results   Component Value Date    FLUAH1 Negative 11/22/2021    FLUAH3 Negative 11/22/2021    LC0995 Negative 11/22/2021    IFLUB Negative 11/22/2021    RSVA Negative 11/22/2021    RSVB Negative 11/22/2021    PIV1 Negative 11/22/2021    PIV2 Negative 11/22/2021    PIV3 Negative 11/22/2021    HMPV Negative 11/22/2021    HRVS Negative 11/22/2021    ADVBE Negative 11/22/2021    ADVC Negative 11/22/2021    ADVC Negative 11/12/2021    ADVC Negative 10/17/2021       Historical CMV results (last 3 of prior testing):  Lab Results   Component Value Date    CMVQNT Not Detected 11/22/2021    CMVQNT Not Detected 11/16/2021    CMVQNT Not Detected 11/12/2021     No results found for: CMVLOG    Urine Studies    Recent Labs   Lab Test 11/01/21  1336 10/25/21  1507   URINEPH 5.5 5.5   NITRITE Negative Negative   LEUKEST Negative Negative   WBCU 0 2       Most Recent [facility] Pulmonary Function Testing (FVC/FEV1 only)  No results found for: 20002  No results found for: 20003  No results found for: 20015  No results found for: 20016    IMAGING    Recent Results (from the past 48 hour(s))   XR Chest Port 1 View    Narrative    Exam: XR CHEST PORT 1 VIEW, 11/21/2021 4:28 AM    Comparison: Chest x-ray 11/20/2021    History: s/p lung transplant    Findings:  Single portable AP semiupright view of the chest is obtained.  Postoperative changes of bilateral lung transplantation, clamshell  sternotomy wires are intact. Tracheostomy tube tip is in the thoracic  trachea. Left upper extremity PICC tip terminates in the superior  cavoatrial junction. Left basilar chest tube in place.    Trachea is midline. Mediastinum is within normal limits.  Cardiopulmonary silhouette is within normal limits. Trace bilateral  effusions and associated atelectasis. No appreciable pneumothorax. The  upper abdomen is unremarkable.      Impression    Impression:   1. Slightly reduced bilateral effusions.  2. Reduced left basilar  atelectasis.  3. Slightly increased right basilar atelectasis.    I have personally reviewed the examination and initial interpretation  and I agree with the findings.    SHANDRA CEVALLOS MD         SYSTEM ID:  [id number]   CT Chest w/o Contrast    Narrative    EXAMINATION: CT CHEST W/O CONTRAST, 11/22/2021 6:02 AM    CLINICAL HISTORY: Abnormal xray - pleural effusion    COMPARISON: Radiograph 11/21/2021, 11/20/2021. CT 11/2/2021.    TECHNIQUE: CT imaging obtained through the chest without contrast.  Coronal, sagittal and axial MIP reformatted images obtained and  reviewed.     FINDINGS:    Lines, tubes, devices: Tracheostomy in place. Left upper extremity  PICC terminating at the superior cavoatrial junction. Left basilar  chest tube. Median sternotomy wires are intact.    Lungs: Post surgical changes of bilateral lung transplantation. The  central tracheobronchial tree is patent. Peripheral mucous plugging in  the bilateral lower lobes. Multifocal groundglass opacities some of  which are new compared to CT 11/2/2021. Interval development of a  cavitary lesion of the left lung base measuring 2.7 x 2.9 cm (series 6  image 190).  Decreased bilateral loculated pleural effusions with  associated compressive atelectasis.    Mediastinum: The visualized thyroid is unremarkable.Heart size is  enlarged. Small pericardial effusion. Mild coronary artery  calcifications. The thoracic aorta and main pulmonary artery are  within normal limits. Standard branching pattern of the great vessels.  No abnormal thoracic lymph nodes.    Bones and soft tissues: Subacute bilateral rib fractures. 1.2 x 1.8 cm  fluid collection in the left axilla with surrounding fat stranding  (series 2 image 27). No suspicious osseous lesion. Mild degenerative  changes of the thoracolumbar spine.    Upper abdomen:  Limited evaluation of the upper abdomen. No acute  pathology of the visualized solid organs. Percutaneous  gastrojejunostomy tube.       Impression    IMPRESSION:   1. New cavitary lesion in the left lung base with multifocal  groundglass opacities some of which are new compared to CT 11/2/2021,  notably in the bilateral upper lobes concerning for ongoing infection.  2. Decreased bilateral loculated pleural effusions.  3. New 1.8 cm fluid collection with surrounding fat stranding in the  left axilla.  4. Similar small pericardial effusion.    I have personally reviewed the examination and initial interpretation  and I agree with the findings.    ARIES DELGADO MD         SYSTEM ID:  [id number]

## 2021-11-28 ENCOUNTER — APPOINTMENT (OUTPATIENT)
Dept: SPEECH THERAPY | Facility: CLINIC | Age: 56
End: 2021-11-28
Attending: STUDENT IN AN ORGANIZED HEALTH CARE EDUCATION/TRAINING PROGRAM
Payer: COMMERCIAL

## 2021-11-28 LAB
ALBUMIN SERPL-MCNC: 2.1 G/DL (ref 3.4–5)
ALP SERPL-CCNC: 114 U/L (ref 40–150)
ALT SERPL W P-5'-P-CCNC: 25 U/L (ref 0–70)
ANION GAP SERPL CALCULATED.3IONS-SCNC: 4 MMOL/L (ref 3–14)
ANION GAP SERPL CALCULATED.3IONS-SCNC: 4 MMOL/L (ref 3–14)
ANION GAP SERPL CALCULATED.3IONS-SCNC: 6 MMOL/L (ref 3–14)
AST SERPL W P-5'-P-CCNC: 25 U/L (ref 0–45)
ATRIAL RATE - MUSE: 73 BPM
BILIRUB SERPL-MCNC: 0.2 MG/DL (ref 0.2–1.3)
BUN SERPL-MCNC: 57 MG/DL (ref 7–30)
BUN SERPL-MCNC: 60 MG/DL (ref 7–30)
BUN SERPL-MCNC: 60 MG/DL (ref 7–30)
CALCIUM SERPL-MCNC: 8.8 MG/DL (ref 8.5–10.1)
CALCIUM SERPL-MCNC: 9.1 MG/DL (ref 8.5–10.1)
CALCIUM SERPL-MCNC: 9.1 MG/DL (ref 8.5–10.1)
CHLORIDE BLD-SCNC: 102 MMOL/L (ref 94–109)
CHLORIDE BLD-SCNC: 104 MMOL/L (ref 94–109)
CHLORIDE BLD-SCNC: 104 MMOL/L (ref 94–109)
CO2 SERPL-SCNC: 31 MMOL/L (ref 20–32)
CO2 SERPL-SCNC: 32 MMOL/L (ref 20–32)
CO2 SERPL-SCNC: 32 MMOL/L (ref 20–32)
CREAT SERPL-MCNC: 0.78 MG/DL (ref 0.66–1.25)
DIASTOLIC BLOOD PRESSURE - MUSE: NORMAL MMHG
ERYTHROCYTE [DISTWIDTH] IN BLOOD BY AUTOMATED COUNT: 15.9 % (ref 10–15)
GFR SERPL CREATININE-BSD FRML MDRD: >90 ML/MIN/1.73M2
GLUCOSE BLD-MCNC: 164 MG/DL (ref 70–99)
GLUCOSE BLD-MCNC: 169 MG/DL (ref 70–99)
GLUCOSE BLD-MCNC: 169 MG/DL (ref 70–99)
GLUCOSE BLDC GLUCOMTR-MCNC: 127 MG/DL (ref 70–99)
GLUCOSE BLDC GLUCOMTR-MCNC: 131 MG/DL (ref 70–99)
GLUCOSE BLDC GLUCOMTR-MCNC: 141 MG/DL (ref 70–99)
GLUCOSE BLDC GLUCOMTR-MCNC: 143 MG/DL (ref 70–99)
GLUCOSE BLDC GLUCOMTR-MCNC: 199 MG/DL (ref 70–99)
GLUCOSE BLDC GLUCOMTR-MCNC: 233 MG/DL (ref 70–99)
HCT VFR BLD AUTO: 29.1 % (ref 40–53)
HGB BLD-MCNC: 9.3 G/DL (ref 13.3–17.7)
INR PPP: 1.43 (ref 0.85–1.15)
INTERPRETATION ECG - MUSE: NORMAL
LACTATE SERPL-SCNC: 1.2 MMOL/L (ref 0.7–2)
MAGNESIUM SERPL-MCNC: 1.6 MG/DL (ref 1.6–2.3)
MCH RBC QN AUTO: 31 PG (ref 26.5–33)
MCHC RBC AUTO-ENTMCNC: 32 G/DL (ref 31.5–36.5)
MCV RBC AUTO: 97 FL (ref 78–100)
P AXIS - MUSE: 28 DEGREES
PHOSPHATE SERPL-MCNC: 3.1 MG/DL (ref 2.5–4.5)
PLATELET # BLD AUTO: 71 10E3/UL (ref 150–450)
POTASSIUM BLD-SCNC: 4.1 MMOL/L (ref 3.4–5.3)
POTASSIUM BLD-SCNC: 4.1 MMOL/L (ref 3.4–5.3)
POTASSIUM BLD-SCNC: 4.3 MMOL/L (ref 3.4–5.3)
PR INTERVAL - MUSE: 140 MS
PROT SERPL-MCNC: 5.8 G/DL (ref 6.8–8.8)
QRS DURATION - MUSE: 72 MS
QT - MUSE: 420 MS
QTC - MUSE: 462 MS
R AXIS - MUSE: 29 DEGREES
RBC # BLD AUTO: 3 10E6/UL (ref 4.4–5.9)
SODIUM SERPL-SCNC: 139 MMOL/L (ref 133–144)
SODIUM SERPL-SCNC: 140 MMOL/L (ref 133–144)
SODIUM SERPL-SCNC: 140 MMOL/L (ref 133–144)
SYSTOLIC BLOOD PRESSURE - MUSE: NORMAL MMHG
T AXIS - MUSE: 20 DEGREES
VENTRICULAR RATE- MUSE: 73 BPM
WBC # BLD AUTO: 11.7 10E3/UL (ref 4–11)

## 2021-11-28 PROCEDURE — 250N000013 HC RX MED GY IP 250 OP 250 PS 637: Performed by: INTERNAL MEDICINE

## 2021-11-28 PROCEDURE — 250N000012 HC RX MED GY IP 250 OP 636 PS 637: Performed by: PHYSICIAN ASSISTANT

## 2021-11-28 PROCEDURE — 258N000003 HC RX IP 258 OP 636: Performed by: INTERNAL MEDICINE

## 2021-11-28 PROCEDURE — 94640 AIRWAY INHALATION TREATMENT: CPT | Mod: 76

## 2021-11-28 PROCEDURE — 80069 RENAL FUNCTION PANEL: CPT

## 2021-11-28 PROCEDURE — 250N000013 HC RX MED GY IP 250 OP 250 PS 637

## 2021-11-28 PROCEDURE — 250N000011 HC RX IP 250 OP 636: Performed by: INTERNAL MEDICINE

## 2021-11-28 PROCEDURE — 250N000013 HC RX MED GY IP 250 OP 250 PS 637: Performed by: STUDENT IN AN ORGANIZED HEALTH CARE EDUCATION/TRAINING PROGRAM

## 2021-11-28 PROCEDURE — 85027 COMPLETE CBC AUTOMATED: CPT | Performed by: THORACIC SURGERY (CARDIOTHORACIC VASCULAR SURGERY)

## 2021-11-28 PROCEDURE — 92507 TX SP LANG VOICE COMM INDIV: CPT | Mod: GN

## 2021-11-28 PROCEDURE — 82040 ASSAY OF SERUM ALBUMIN: CPT | Performed by: THORACIC SURGERY (CARDIOTHORACIC VASCULAR SURGERY)

## 2021-11-28 PROCEDURE — 83735 ASSAY OF MAGNESIUM: CPT | Performed by: THORACIC SURGERY (CARDIOTHORACIC VASCULAR SURGERY)

## 2021-11-28 PROCEDURE — 99233 SBSQ HOSP IP/OBS HIGH 50: CPT | Performed by: INTERNAL MEDICINE

## 2021-11-28 PROCEDURE — 999N000215 HC STATISTIC HFNC ADULT NON-CPAP

## 2021-11-28 PROCEDURE — 83605 ASSAY OF LACTIC ACID: CPT | Performed by: INTERNAL MEDICINE

## 2021-11-28 PROCEDURE — 94668 MNPJ CHEST WALL SBSQ: CPT

## 2021-11-28 PROCEDURE — 250N000013 HC RX MED GY IP 250 OP 250 PS 637: Performed by: THORACIC SURGERY (CARDIOTHORACIC VASCULAR SURGERY)

## 2021-11-28 PROCEDURE — 999N000044 HC STATISTIC CVC DRESSING CHANGE

## 2021-11-28 PROCEDURE — 250N000011 HC RX IP 250 OP 636: Performed by: PHYSICIAN ASSISTANT

## 2021-11-28 PROCEDURE — 999N000157 HC STATISTIC RCP TIME EA 10 MIN

## 2021-11-28 PROCEDURE — 84100 ASSAY OF PHOSPHORUS: CPT | Performed by: THORACIC SURGERY (CARDIOTHORACIC VASCULAR SURGERY)

## 2021-11-28 PROCEDURE — 250N000012 HC RX MED GY IP 250 OP 636 PS 637: Performed by: INTERNAL MEDICINE

## 2021-11-28 PROCEDURE — 99233 SBSQ HOSP IP/OBS HIGH 50: CPT | Mod: 24 | Performed by: INTERNAL MEDICINE

## 2021-11-28 PROCEDURE — 250N000013 HC RX MED GY IP 250 OP 250 PS 637: Performed by: NURSE PRACTITIONER

## 2021-11-28 PROCEDURE — 80048 BASIC METABOLIC PNL TOTAL CA: CPT

## 2021-11-28 PROCEDURE — 120N000003 HC R&B IMCU UMMC

## 2021-11-28 PROCEDURE — 36592 COLLECT BLOOD FROM PICC: CPT | Performed by: INTERNAL MEDICINE

## 2021-11-28 PROCEDURE — 250N000013 HC RX MED GY IP 250 OP 250 PS 637: Performed by: ANESTHESIOLOGY

## 2021-11-28 PROCEDURE — 85610 PROTHROMBIN TIME: CPT | Performed by: THORACIC SURGERY (CARDIOTHORACIC VASCULAR SURGERY)

## 2021-11-28 PROCEDURE — 250N000011 HC RX IP 250 OP 636: Performed by: ANESTHESIOLOGY

## 2021-11-28 PROCEDURE — 250N000009 HC RX 250: Performed by: INTERNAL MEDICINE

## 2021-11-28 PROCEDURE — 999N000043 HC STATISTIC CTO2 CONT OXYGEN TECH TIME EA 90 MIN

## 2021-11-28 PROCEDURE — 94640 AIRWAY INHALATION TREATMENT: CPT

## 2021-11-28 PROCEDURE — 92526 ORAL FUNCTION THERAPY: CPT | Mod: GN

## 2021-11-28 RX ORDER — WARFARIN SODIUM 3 MG/1
3 TABLET ORAL
Status: COMPLETED | OUTPATIENT
Start: 2021-11-28 | End: 2021-11-28

## 2021-11-28 RX ADMIN — FUROSEMIDE 40 MG: 10 INJECTION INTRAMUSCULAR; INTRAVENOUS at 16:58

## 2021-11-28 RX ADMIN — CEFTAZIDIME 2 G: 2 INJECTION, POWDER, FOR SOLUTION INTRAVENOUS at 13:33

## 2021-11-28 RX ADMIN — PREDNISONE 10 MG: 5 TABLET ORAL at 20:52

## 2021-11-28 RX ADMIN — Medication 40 MG: at 07:54

## 2021-11-28 RX ADMIN — TACROLIMUS 2 MG: 5 CAPSULE ORAL at 07:54

## 2021-11-28 RX ADMIN — FLUCONAZOLE IN SODIUM CHLORIDE 400 MG: 2 INJECTION, SOLUTION INTRAVENOUS at 11:03

## 2021-11-28 RX ADMIN — SULFAMETHOXAZOLE AND TRIMETHOPRIM 1 TABLET: 400; 80 TABLET ORAL at 07:53

## 2021-11-28 RX ADMIN — HYDROXYZINE HYDROCHLORIDE 50 MG: 50 TABLET, FILM COATED ORAL at 20:53

## 2021-11-28 RX ADMIN — ACETAMINOPHEN 975 MG: 325 TABLET, FILM COATED ORAL at 23:53

## 2021-11-28 RX ADMIN — QUETIAPINE FUMARATE 25 MG: 25 TABLET ORAL at 07:53

## 2021-11-28 RX ADMIN — LEVOFLOXACIN 750 MG: 5 INJECTION, SOLUTION INTRAVENOUS at 16:58

## 2021-11-28 RX ADMIN — NYSTATIN 1000000 UNITS: 500000 SUSPENSION ORAL at 16:58

## 2021-11-28 RX ADMIN — INSULIN GLARGINE 30 UNITS: 100 INJECTION, SOLUTION SUBCUTANEOUS at 21:03

## 2021-11-28 RX ADMIN — OXYCODONE HYDROCHLORIDE 5 MG: 5 TABLET ORAL at 23:53

## 2021-11-28 RX ADMIN — LEVOTHYROXINE SODIUM 25 MCG: 0.03 TABLET ORAL at 07:53

## 2021-11-28 RX ADMIN — NYSTATIN 1000000 UNITS: 500000 SUSPENSION ORAL at 07:53

## 2021-11-28 RX ADMIN — CEFTAZIDIME 2 G: 2 INJECTION, POWDER, FOR SOLUTION INTRAVENOUS at 04:24

## 2021-11-28 RX ADMIN — TACROLIMUS 2 MG: 5 CAPSULE ORAL at 18:35

## 2021-11-28 RX ADMIN — Medication 40 MG: at 22:07

## 2021-11-28 RX ADMIN — INSULIN ASPART 4 UNITS: 100 INJECTION, SOLUTION INTRAVENOUS; SUBCUTANEOUS at 12:09

## 2021-11-28 RX ADMIN — LEVALBUTEROL HYDROCHLORIDE 1.25 MG: 1.25 SOLUTION RESPIRATORY (INHALATION) at 08:54

## 2021-11-28 RX ADMIN — QUETIAPINE FUMARATE 25 MG: 25 TABLET ORAL at 14:58

## 2021-11-28 RX ADMIN — LEVALBUTEROL HYDROCHLORIDE 1.25 MG: 1.25 SOLUTION RESPIRATORY (INHALATION) at 19:27

## 2021-11-28 RX ADMIN — MULTIVIT AND MINERALS-FERROUS GLUCONATE 9 MG IRON/15 ML ORAL LIQUID 15 ML: at 07:53

## 2021-11-28 RX ADMIN — OXYCODONE HYDROCHLORIDE 5 MG: 5 TABLET ORAL at 16:58

## 2021-11-28 RX ADMIN — MYCOPHENOLATE MOFETIL 1000 MG: 200 POWDER, FOR SUSPENSION ORAL at 07:54

## 2021-11-28 RX ADMIN — NYSTATIN 1000000 UNITS: 500000 SUSPENSION ORAL at 20:53

## 2021-11-28 RX ADMIN — QUETIAPINE FUMARATE 25 MG: 25 TABLET ORAL at 20:53

## 2021-11-28 RX ADMIN — PREDNISONE 12.5 MG: 2.5 TABLET ORAL at 07:53

## 2021-11-28 RX ADMIN — ROSUVASTATIN CALCIUM 10 MG: 10 TABLET, FILM COATED ORAL at 07:52

## 2021-11-28 RX ADMIN — INSULIN ASPART 2 UNITS: 100 INJECTION, SOLUTION INTRAVENOUS; SUBCUTANEOUS at 08:23

## 2021-11-28 RX ADMIN — Medication 50 ML: at 07:55

## 2021-11-28 RX ADMIN — CALCIUM CARBONATE 600 MG (1,500 MG)-VITAMIN D3 400 UNIT TABLET 1 TABLET: at 07:53

## 2021-11-28 RX ADMIN — Medication 1 TABLET: at 07:53

## 2021-11-28 RX ADMIN — INSULIN ASPART 3 UNITS: 100 INJECTION, SOLUTION INTRAVENOUS; SUBCUTANEOUS at 00:02

## 2021-11-28 RX ADMIN — QUETIAPINE FUMARATE 50 MG: 50 TABLET ORAL at 20:54

## 2021-11-28 RX ADMIN — Medication 10 MG: at 20:53

## 2021-11-28 RX ADMIN — INSULIN GLARGINE 30 UNITS: 100 INJECTION, SOLUTION SUBCUTANEOUS at 08:21

## 2021-11-28 RX ADMIN — CALCIUM CARBONATE 600 MG (1,500 MG)-VITAMIN D3 400 UNIT TABLET 1 TABLET: at 18:35

## 2021-11-28 RX ADMIN — AMLODIPINE BESYLATE 5 MG: 2.5 TABLET ORAL at 07:52

## 2021-11-28 RX ADMIN — ACETAMINOPHEN 975 MG: 325 TABLET, FILM COATED ORAL at 14:58

## 2021-11-28 RX ADMIN — ESCITALOPRAM 5 MG: 5 TABLET, FILM COATED ORAL at 07:53

## 2021-11-28 RX ADMIN — WARFARIN SODIUM 3 MG: 3 TABLET ORAL at 18:35

## 2021-11-28 RX ADMIN — FUROSEMIDE 40 MG: 10 INJECTION INTRAMUSCULAR; INTRAVENOUS at 07:53

## 2021-11-28 RX ADMIN — MYCOPHENOLATE MOFETIL 1000 MG: 200 POWDER, FOR SUSPENSION ORAL at 20:53

## 2021-11-28 RX ADMIN — CEFTAZIDIME 2 G: 2 INJECTION, POWDER, FOR SOLUTION INTRAVENOUS at 20:52

## 2021-11-28 RX ADMIN — ACETYLCYSTEINE 2 ML: 200 SOLUTION ORAL; RESPIRATORY (INHALATION) at 19:27

## 2021-11-28 RX ADMIN — ACETYLCYSTEINE 2 ML: 200 SOLUTION ORAL; RESPIRATORY (INHALATION) at 08:52

## 2021-11-28 ASSESSMENT — ACTIVITIES OF DAILY LIVING (ADL)
ADLS_ACUITY_SCORE: 25
ADLS_ACUITY_SCORE: 25
ADLS_ACUITY_SCORE: 23
ADLS_ACUITY_SCORE: 25
ADLS_ACUITY_SCORE: 23
ADLS_ACUITY_SCORE: 23
ADLS_ACUITY_SCORE: 25

## 2021-11-28 ASSESSMENT — MIFFLIN-ST. JEOR: SCORE: 1402.25

## 2021-11-28 NOTE — PLAN OF CARE
Neuro: A&Ox4.   Cardiac: SR. VSS.   Respiratory: Sating 93-94% on 30% 30 liters O2 via trach.  GI/: Adequate urine output.  Loose bm X2.  Diet/appetite: TFs at 65 ml/hr. Regular diet ordered.  Activity:  Turned with assist of 2.  Pain: Denies.  Skin: No new deficits noted.  LDA's: TL PICC               CT -20 cm                G/J tube. GT to gravity , JT feeds and meds.    Plan: Continue with POC. Notify primary team with changes.

## 2021-11-28 NOTE — PROGRESS NOTES
[facility]    Medicine Progress Note - Hospitalist Service       Date of Admission:  9/5/2021    Assessment & Plan            Edson Thornton is a 55 yo M with PMHx of NSIP/ILD 2/2 Rheumatoid lung disease, RA, bronchiectasis, moderately pulm HTN, SALTY, HTN, HLD, PLD, hypogammaglobinemia, duodenal anomaly, anxiety, and depression s/p bilateral lung transplant on 10/16/2021, with post operative course complicated by prolonged vent wean s/p trach and PEG/J tube placement with thoracic surgery on 10/29. Post-op course complicated encephalopathy, and diffuse weakness, acute to subacute by CVA, afib with RVR, BRENNAN, candidemia/candida empyema, and Code Blue due to bradycardia/asystole and hypotension for significant GIB s/p EGD with adherent clot near PEG tube site s/p clips. Transferred from ICU to medicine on 11/23/2021.     Plan for today:  Await pleural cultures  Discuss heparin bridging while INR recovers  Okay to transfer to   Continue antibiotics  Start Capping trials     # ILD and NSIP s/p BSLT on 10/16/2021, POD 38  # Acute hypoxic respiratory failure s/p tracheostomy on 10/29  # Bilateral pleural effusions   - Transplant pulmonology following, appreciate recs   - IS: s/p basiliximab on 10/16 and 10/20. Continue tacrolimus 2 mg QAM and 2.5 mg QPM (goal 8-12), MMF 1000 mg BID, and prednisone 12.5 mg QAM and 10 mg QPM  - Ppx: Continue bactrim 400-80 mg daily, nystatin QID x6 months  - Monthly Coccidioides Ag x12 months post-transplant per ID (urine and serum negative 11/17)   - DSA every 2 weeks   - Levalbuterol and mucomyst QID and pulm toilet and chest PT QID  - O2 via trach dome as tolerated otherwise BiPAP for support   - Daily CXR   - Diuresis with IV lasix 40 mg BID  - Oxycodone 5 mg Q4H PRN    - PT/OT/SLP      # L lung cavitary lesion - suspect aspiration vs pseudomonal vs K pneumonia induced abscess. CT on 10/25 with LLL nodular opacity. Repeat CT chest on  11/22 with new cavitary lesion in the left lung base, and with multifocal bilateral upper lobe GGO (some of which are new), new 1.8 cm fluid collection with surrounding fat stranding in the left axilla, decreased bilateral loculated pleural effusions. Indeterminate Quant Gold, but negative tuberculin skin tests on mulitple occurences. S/p BAL on 11/22. ID feels less likely fungal.   - Transplant ID following   - Follow up histo and blastomyces serum ab, BDG fungitell and aspergillus Ag (11/22)   - Follow up BAL studies   - ID recommends stopping zosyn (11/15-11/23), start Ceftazdime 2 grams Q8H and levaquin 750 mg daily    - Follow up CT chest in 4 weeks  - Diagnostic/therapeutic thoracentesis 11/27. Exudative, cell count with lymphocyte predominance    - cultures pending     # Klebsiella pneumoniae and Pseudomonas fluorescens/putida HAP- Klebsiella initially noted trach sputum culture 11/10. Bronch culture 11/12 with Klebsiella and Pseudomonas fluorescens/putida (R-meropenem).     - Abx as above.       # Disseminated Candida - + candida BCx 10/20 and 10/22.  BDG fungitell positive (399) on 10/20. Sputum Cx + Candida albicans persistently.  TC 10/23 without evidence of endocarditis. Ophthalmology consult 10/24 with benign dilated fundoscopic exam.  Candida empyema also noted 10/25, chest tubes inadvertently removed by CVTS 10/28, left chest tube replaced by IR 11/3 as above with ongoing Candida on cultures. Repeat pleural fungal cultures and fungal/bacterial blood cultures on 11/18 NGTD. Transplant as noted above following   - L sided chest tube in place  - Follow up BDG fungitell and Aspergillus galactomannan Ag (11/22)   - Initially on Micafungin (10/22-10/27) transition to fluconazole 400 mg IV daily (start date 10/26), with plans to re-evaluate on 11/24 (was held for one day, then restarted.   - EKG for QTc monitoring (ordered for 11/27)   - ID recommends evacuate any remaining R and/or left loculated effusions       # Hypogammaglobinemia - s/p IVIG with plan to repeat IgG on 12/15     # Encephalopathy - likely multi-factorial. Multiple brain imaging with stroke as noted below, but negative for PRESS. Ammonia negative. vEEG with severe diffuse encephalopathy. LP as noted below negative for infection. Neurology previously following.   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 MRI brain on 10/23 with multifocal subacute infarct within both cerebral hemispheres and left cerebellum. Presumed embolic with afib hx.   - Anticoagulation as noted below      # Afib with hx of RVR - [id number] for HTN, CVA, and DM2. First noted on 10/18, started on amiodarone drip, and converted to NSR. Metoprolol and amidoarone discontinued on 11/20 d/t intermittent bradycardia.   - Anticoagulation with warfarin as noted below  - Continue Rosuvastatin 10 mg daily      # R subclavian DVT - dx on 11/4. Resumed on heparin drip on 11/5 and transitioned to warfarin in setting of thrombocytopenia. HIT panel negative on 11/21.   - Continue warfarin per pharmacy protocol (held on 11/26 for thora) will restart.       # DM2 with steroid induced hyperglycemia - Hemoglobin A1c 6.6 pre-operatively. Endocrine recently consulted for steroids induced hyperglycemia.   - Continue Lantus 30 mg BID  - Novolog High Sliding scale insulin Q4H  - BG Q4H      # Hypernatremia - Na 146. Currently receiving 100 mL of free water via TF Q4H. Trend BMP      # Hypomagnesemia - Likely 2/2 suppressed reabsorption from CNI.   - Sliding scale replacement protocol       # Diarrhea - C. Diff negative on 11/20. Rectal tube in place on 11/22. Possibly due to tube feeds or magnesium supplementation.   - Monitor I&O      # Malnutrition - S/p PEGJ  - MVI, folic acid, B6, B12 supplements   - TF per nutrition       ------ Chronic stable medical problems ------     # RA- Dx 5/2021 with + CCP ab and RF. Previously treated with rituximab. Rheum consulted early in admission. On steroids as noted above.   # SALTY - Uses CPAP at bedtime prior to admission. Will need to evaluate for BiPAP after decannulation   # HTN- Continue PTA amlodipine 5 mg daily. PRN labetalol and hydralazine for goal SBP <140   # Anxiety and depression - Continue PTA lexapro 5 mg daily   # Hypothyroidism - TSH 3.17 on 11/19/21. Continue PTA levothyroxine 25 mcg daily      ------ Resolved Hospital problems -------     # Recurrent GIB - hgb drop on 10/22 s/p 2 unit pRBC transfusion with EGD on 10/23 with NJ/OG tube trauma with scant oozing. Patient then developed progressive hypotension and ultimately CODE BLUE for GIB in setting of anticoagulation with heparin drip, s/p MTP. EGD on 11/2 with large amount of clotted blood in stomach and area of raised mucosa with small adherent clot near PEG tube site that was clipped, no active bleeding.  Maroon stools 11/3, repeat EGD with extensive old blood in stomach, no active bleeding, small nodular area with prior clips clipped again.  Most recent EGD 11/5 with ulcer noted at PEG tube bumper site, gastritis, and suction marks from G tube. TF noted in G tube drainage bag 11/7, AXR with GJ tube tip projecting over proximal duodenum. GJ tube exchanged 11/9 by GI.  - PO PPI BID      # Transaminitis - elevated LFTs post-op, thought to be due to shock liver      # BRENNAN - post operative BRENNAN, Cr peaked at 2.05, with renal function back at baseline with Cr at 0.7     # Recurring fevers - Fevers post-op, Tmax 101.7 POD #1.  Febrile with worsening leukocytosis again 10/25, generally persisting.  LP (10/29), xanthochromic with pleocytosis thought to be appropriate given RBC and WBCs, no ABX recommended per transplant ID and neurology.  S/p empiric meropenem 10/28-11/2.  Now afebrile, ABX as above.     # HSV-  Chronic  intermittent active infection pre-transplant with recent HSV infection: crusted lesions throughout left side of jaw, s/p 10 day treatment course of ACV through 10/9.  HSV PCR blood negative 10/17.  S/p ACV ppx as above (started POD #1 instead of POD #8 given HSV history and location).       Diet: Adult Formula Drip Feeding: Continuous Pivot 1.5; Jejunostomy; Goal Rate: 65; mL/hr; Medication - Feeding Tube Flush Frequency: At least 15-30 mL water before and after medication administration and with tube clogging; 11/22: Once current liter (V...  Regular Diet Adult    DVT Prophylaxis: Heparin gtt  Watson Catheter: Not present  Central Lines: None  Code Status: Full Code      Disposition Plan   Expected discharge:  > 7 days recommended to transitional care unit once antibiotic plan established.     The patient's care was discussed with the Bedside Nurse, Patient and Pulm Consultant.    Vincent Koo MD  Hospitalist Service  [facility]  Securely message with the Vocera Web Console (learn more here)  Text page via [employer] Paging/Directory    Please see sign in/sign out for up to date coverage information    Clinically Significant Risk Factors Present on Admission                ______________________________________________________________________    Interval History   Patient seen and examined.  No acute overnight events.  Tolerated thoracentesis well.  Denies HA, dizziness, CP, N/V, abdominal pain or other symptoms     Data reviewed today: I reviewed all medications, new labs and imaging results over the last 24 hours. I personally reviewed no images or EKG's today.    Physical Exam   Vital Signs: Temp: 98.5  F (36.9  C) Temp src: Oral BP: 126/75 Pulse: 78   Resp: 16 SpO2: 93 % O2 Device: Trach dome Oxygen Delivery: 30 LPM  Weight: 145 lbs 11.58 oz  General Appearance: NAD  Respiratory: coarse bilaterally, diminished at the bases, transmitted upper airway  noises  Cardiovascular: RRR S1/S2, no m,r,g  GI: soft, NT, ND, +BS  Skin: no rashes  Other: No edema     Data   Recent Labs   Lab 11/28/21  1207 11/28/21  0604 11/28/21  0414 11/27/21  2046 11/27/21  1625 11/27/21  0753 11/27/21  0732 11/26/21  1808 11/26/21  1708 11/26/21  0841 11/26/21  0603   WBC  --  11.7*  --   --   --   --  14.3*  --   --   --  14.5*   HGB  --  9.3*  --   --   --   --  9.9*  --   --   --  10.3*   MCV  --  97  --   --   --   --  100  --   --   --  100   PLT  --  71*  --   --   --   --  78*  --   --   --  72*   INR  --  1.43*  --   --   --   --  1.51*  --  1.75*  --  2.35*   NA  --  140  140  --   --  140  --  139  139  --  142  --  144  144   POTASSIUM  --  4.1  4.1  --   --  4.3  --  4.2  4.2  --  3.9  --  4.2  4.2   CHLORIDE  --  104  104  --   --  103  --  106  106  --  106  --  108  108   CO2  --  32  32  --   --  30  --  29  29  --  27  --  28  28   BUN  --  60*  60*  --   --  64*  --  65*  65*  --  70*  --  71*  71*   CR  --  0.78  0.78  --   --  0.80  --  0.72  0.72  --  0.83  --  0.82  0.82   ANIONGAP  --  4  4  --   --  7  --  4  4  --  9  --  8  8   ERIK  --  9.1  9.1  --   --  9.0  --  9.0  9.0  --  9.1  --  9.4  9.4   * 169*  169* 127*   < > 152*   < > 159*  159*   < > 212*   < > 165*  165*   ALBUMIN  --  2.1*  --   --   --   --  2.3*  --   --   --  2.5*   PROTTOTAL  --  5.8*  --   --   --   --  5.9*  --   --   --  6.4*   BILITOTAL  --  0.2  --   --   --   --  0.2  --   --   --  0.2   ALKPHOS  --  114  --   --   --   --  133  --   --   --  141   ALT  --  25  --   --   --   --  27  --   --   --  30   AST  --  25  --   --   --   --  22  --   --   --  26    < > = values in this interval not displayed.     Recent Results (from the past 24 hour(s))   XR Chest Port 1 View    Narrative    Exam:  Chest X-ray 11/27/2021 3:57 PM    History: Evaluate for right pneumothorax post thoracentesis    Comparison: 11/25/2021    Findings:   Single portable AP view of  the chest. Tracheostomy tube is in the mid  thoracic trachea. Left basilar chest tube. Left PICC terminates in the  SVC. Unchanged mild left and trace right pleural effusions. No  discernible pneumothorax. Increased left basilar opacity.      Impression    Impression:   1.  No evidence of pneumothorax.Unchanged mild left and trace right  pleural effusions.  2.  Increased left basilar opacity likely represents atelectasis.    FITO PERALES MD         SYSTEM ID:  [id number]     Medications     dextrose Stopped (10/24/21 1008)     Warfarin Therapy Reminder         acetylcysteine  2 mL Nebulization BID     amLODIPine  5 mg Per G Tube Daily     calcium carbonate 600 mg-vitamin D 400 units  1 tablet Oral or Feeding Tube BID w/meals     cefTAZidime  2 g Intravenous Q8H     escitalopram  5 mg Oral or Feeding Tube Daily     fluconazole  400 mg Intravenous Q24H     folic acid-vit B6-vit B12  1 tablet Per G Tube Daily     furosemide  40 mg Intravenous BID     heparin lock flush  5-20 mL Intracatheter Q24H     insulin aspart  1-12 Units Subcutaneous Q4H     insulin glargine  30 Units Subcutaneous BID     levalbuterol  1.25 mg Nebulization BID     levofloxacin  750 mg Intravenous Q24H     levothyroxine  25 mcg Oral Daily     lidocaine  2 patch Transdermal Q24H     lidocaine   Transdermal Q8H     melatonin  10 mg Oral QPM     multivitamins w/minerals  15 mL Per Feeding Tube Daily     mycophenolate  1,000 mg Per J Tube BID     nystatin  1,000,000 Units Swish & Swallow 4x Daily     pantoprazole  40 mg Per Feeding Tube BID     predniSONE  10 mg Per J Tube QPM     predniSONE  12.5 mg Per J Tube Daily     QUEtiapine  25 mg Oral or Feeding Tube BID     QUEtiapine  50 mg Oral At Bedtime     rosuvastatin  10 mg Oral or Feeding Tube Daily     sodium chloride (PF)  10-40 mL Intracatheter Q8H     sodium chloride (PF)  3 mL Intracatheter Q8H     sulfamethoxazole-trimethoprim  1 tablet Oral or Feeding Tube Daily     tacrolimus  2 mg Oral QPM      tacrolimus  2 mg Oral QAM     warfarin ANTICOAGULANT  3 mg Oral ONCE at 18:00

## 2021-11-28 NOTE — PROGRESS NOTES
Pulmonary Medicine  Cystic Fibrosis - Lung Transplant Team  Progress Note  2021       Patient: Edson Thornton  MRN: [id number]  : 1965 (age 56 year old)  Transplant: 10/16/2021 (Lung), POD#43  Admission date: 2021    Assessment & Plan:    Edson Thornton is a 56 year old male with a PMH significant for NSIP/ILD, bronchiectasis, moderate PH, RA, SALTY, chronic HSV infection, hypogammaglobulinemia, steroid-induced diabetes, hypothyroidism, PFO, HTN, HLD, duodenal anomaly, anxiety, and depression.  Admitted on 21 from OSH for acute on chronic respiratory failure 2/2 ILD exacerbation, now s/p BSLT on 10/16/21.  Prolonged vent wean s/p trach and PEG/J tube placement with thoracic surgery 10/29.  Post-op course otherwise complicated by encephalopathy and diffuse weakness, acute to subacute CVA, afib with RVR, BRENNAN, GI bleed, Candidemia/Candida empyema, and anxiety.  Code called  for bradycardia/asystole, progressive hypotensive, found to have significant GI bleed, EGD with adherent clot near PEG tube site that was clipped.  Tolerating TD trials since  and ongoing improvement in mentation and weakness.     Today's recommendations:  - TD with Cuff down and PMSV during the day.  - Trial of Capping today.  - Talk to thoracic surgery for downsizing of Trach.  - Diuresis per Primary team.  - Tacrolimus repeat level .  - Prednisone taper  not ordered.  - Coccidioides Ag on - repeat.  - Continue Zosyn for 2 week course through .  - Continue fluconazole (re-evaluate around  pending repeat fungal pleural cultures), EKG weekly for QTc monitoring (, ordered).  - Repeat IgG (12/15, ordered).  - DSA monitoring q2 weeks, () ordered.  - Consider bronch next few days to assess airways and clean out.     S/p BSLT for ILD:  Acute hypoxic respiratory failure s/p trach:   Bilateral pleural effusions: Unfortunately had not received vaccination for flu, PNA, or COVID-19  PTA.  Explant pathology with NSIP, no malignancy.  PGD 2-3.  Weaned off paralytic 10/19 (for vent dyssynchrony) and Caroline 10/22.  Initial difficulty weaning sedation given agitation then with neurological findings as below.  Prolonged vent wean s/p trach and PEG/J tube placement with thoracic surgery 10/29.  Code called 11/2 for bradycardia/asystole (required 1 round of CPR, no medications) then progressively hypotensive, GI bleed as below.  Chest CT 11/2 with increased bilateral pleural effusions (moderate left, small right), bibasilar atelectasis with area of hypoenhancing parenchyma in LLL (suspicious for infection), and numerous nondisplaced anterior rib fractures bilaterally.  S/p left chest tube placement in IR 11/3 for pleural effusion/empyema (as below), right deferred given very little effusion on US.  Problems with trach cuff leak 11/3 (requiring multiple exchanges), bronch 11/14 with trach in good position with cuff well sealed in trachea and small to moderate secretions mostly in lower lobes. Has tolerated continuous TD 40% since 11/20.   - Nebs: levalbuterol and Mucomyst QID  - Pulmonary toilet with chest physiotherapy QID  - TD as tolerated otherwise bipap for support  - Diuresis per ICU team  - CT chest (11/22) New cavitary lesion in the left lung base, and with multifocal bilateral upper lobe GGO (some of which are new), new 1.8 cm fluid collection with surrounding fat stranding in the left axilla, decreased bilateral loculated pleural effusions.  - BAL on 11/22/21, growing Klebsiella pneumonia.  - Rt diagnostic thoracentesis (11/27/21): Results pending.  - CXR daily while chest tube in place, will review today's when completed.  11/28/2021: CXR showing left trapped lung. Started lytics on the left side on 11/25/21 in PM. It is now having bloody fluid.     Immunosuppression: s/p induction therapy with basiliximab 10/16 (and high dose IV steroid) and 10/20  - Tacrolimus decreased to 2 mg BID  (suspension, via G tube) on 11/26/2021.  Goal level 8-12. Repeat level 11/22 therapeutic at 13.9, no dose adjustment. Repeat level 11/29 (ordered).   - MMF 1000 mg BID (11/2, decreased given GI bleed, AZA to be avoided given TPMT)  - Prednisolone at 12.5 mg qAM, 10 mg qPM, next taper due 12/5 (not ordered)  Date AM dose (mg) PM dose (mg)   11/21/21 12.5 10   12/5/21 10 10   1/2/22 10 7.5   1/30/22 7.5 7.5   2/27/22 7.5 5   3/27/22 5 5   4/24/22 5 2.5      Prophylaxis:   - Bactrim for PJP ppx (held 11/2-11/5 d/t BRENNAN)  - Nystatin for oral candidiasis ppx, 6 month course  - See below for serologies and viral ppx:    Donor Recipient Intervention   CMV status Negative Negative None, CMV monthly (due 12/14, neg 11/16)   EBV status Positive Positive None, EBV monthly (due 12/14, neg 11/16)   HSV status N/A Positive S/p acyclovir POD #1-30 (recent infection history pre-txp)      ID: Concern for possible Strongyloides exposure pre-transplant s/p ivermectin x1 dose (9/17).  Donor and recipient cultures NGTD.  S/p IV ceftazidime/vancomycin for 48h per protocol and additional empiric ceftazidime 10/19-10/23 given recurrent fevers as below.  Cryptococcal Ag negative 10/23.   - Monthly Coccidioides Ag x12 months post-transplant per ID (urine and serum negative 10/17), 11/17 urine and serum neg  - Serum Histoplasma and Blastomyces (11/22) negative.    Recurring fevers, Resolved:  Fevers post-op, Tmax 101.7 POD #1.  Febrile with worsening leukocytosis again 10/25, generally persisting.  LP (10/29), xanthochromic with pleocytosis thought to be appropriate given RBC and WBCs, no ABX recommended per transplant ID and neurology.  S/p empiric meropenem 10/28-11/2.  Now afebrile, ABX as above.     Klebsiella pneumoniae:  Pseudomonas fluorescens/putida: Klebsiella initially noted trach sputum culture 11/10.  Started on ceftriaxone 11/11, transitioned to ertapenem 11/12-11/13, and back to ceftriaxone 11/14.  Bronch culture 11/12 with  Klebsiella and Pseudomonas fluorescens/putida (R-meropenem).     - ABX: Zosyn (11/15 - 11/23/2021) Started on IV ceftaz/leva (11/23 - now).      Disseminated Candida: Noted on blood cultures 10/20 and 10/22.  BDG fungitell positive (399) on 10/20.  Respiratory cultures with persistent Candida albicans.  TC 10/23 without evidence of endocarditis.  Ophthalmology consult 10/24 with benign dilated fundoscopic exam.  Candida empyema also noted 10/25, chest tubes inadvertently removed by CVTS 10/28, left chest tube replaced by IR 11/3 as above with ongoing Candida on cultures.   - BDG fungitell (11/22) still positive and neg for Aspergillus galactomannan Ag (11/22)  - Repeat pleural fungal cultures from 11/18 is positive. and the fungal/bacterial blood cultures (11/18), NGTD  - Fluconazole (10/26 per transplant ID, prior micafungin 10/22-10/27), repeat EKG for QTc monitoring 11/23 (ordered), LFTs as below (see transplant ID note 11/5).  11/26/2021: We will do thoracentesis on Rt side (Diagnostic/therapeutic) and left side lytics BID x3 days - started on 11/25/21.     HSV: Chronic intermittent active infection pre-transplant with recent HSV infection: crusted lesions throughout left side of jaw, s/p 10 day treatment course of ACV through 10/9.  HSV PCR blood negative 10/17.  S/p ACV ppx as above (started POD #1 instead of POD #8 given HSV history and location).     Hypogammaglobulinemia: IgG previously low at 364 (9/7).  Noted at 265 at time of transplant, s/p IVIG 10/21, repeat IgG (11/17) 198, s/p IVIG (with premedication) 11/18.  - Repeat IgG (12/15, ordered)     Positive cross match: Note that he received two doses of rituximab in June, which is likely contributing to cross match result.  DSA most recently negative 11/15.  - DSA monitoring q2 weeks, (11/29) ordered     SALTY: Noted pre-transplant.  Home CPAP 6-12 cm H2O.  - Evaluate need for BiPAP after decannulation.     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  MRI 10/23 with multifocal subacute infarcts within both cerebral hemispheres and left cerebellum.  DDx include surgery v embolic v infectious.  Heparin drip started 10/23 (intermittently held with GI bleed as below).  Repeat stroke code 10/25 d/t marked decrease in responsiveness with sedation wean, pupil inequality, and absent gag reflex.  CTA head without obvious new pathology, MRI brain (with and without contrast) primarily revealing for infarct, low likelihood of PRES.  Ammonia normal.  VEEG per neuro with severe diffuse encephalopathy.  Gradual improvement noted since 10/29, repeat head CT 11/2 (following code) without new acute intracranial abnormalities.  - AC management per primary team as below  - PT/OT     Afib with RVR: Noted 10/18, started on amiodarone drip and converted to SR, transitioned to PO 10/21, decreased 10/29. Metoprolol and amiodarone discontinued 11/20 d/t intermittent bradycardia. AC per primary team.     Right subclavian DVT: Seen on UE US from 11/4.  Heparin drip resumed 11/5, transitioned to warfarin 11/20 and in the setting of thrombocytopenia. HIT negative (11/21).      Recurrent GI bleed, Resolved: Hemoglobin dropped to 6.6 10/22, s/p 2 units pRBC.  OG tube with bloody output, EGD 10/23 noted NJ/OG tube trauma with scant oozing.  Progressive hypotension 11/2, hemoglobin 5.8 with bloody G tube output.  Heparin drip held, transitioned to PPI drip, and MTP activated.  EGD 11/2 with large amount of clotted blood in stomach and area of raised mucosa with small adherent clot near PEG tube site that was clipped, no active bleeding.  Maroon stools 11/3, repeat EGD with extensive old blood in stomach, no active bleeding, small  "nodular area with prior clips clipped again.  Most recent EGD 11/5 with ulcer noted at PEG tube bumper site, gastritis, and suction marks from G tube. TF noted in G tube drainage bag 11/7, AXR with GJ tube tip projecting over proximal duodenum. GJ tube exchanged 11/9 by GI.      Elevated LFTs: Shock liver post-op, now resolved.  Intermittent alk phos elevation.     Hypomagnesemia: Suppressed reabsorption 2/2 CNI.  Requiring nearly daily IV replacement.  - IV and PO magnesium supplementation      We appreciate the excellent care provided by the CVICU and CVTS teams.  Recommendations communicated via in person rounding and this note.  Will continue to follow along closely, please do not hesitate to call with any questions or concerns.         Subjective & Interval History:     Breathing comfortably on TD 25 - 30%, 25L. Minimal cough. Denies pain. No nausea, reflux, or abdominal discomfort. Loose stools continue, improving. CPT BID. Left chest tube with more blood tinged fluid after instilling lytics.    Sleep fair.    Review of Systems:     ROS as above otherwise limited due to communication barriers    Physical Exam:     Vital signs:  Temp: 98.7  F (37.1  C) Temp src: Oral BP: 126/80 Pulse: 107   Resp: 23 SpO2: 94 % O2 Device: Trach dome Oxygen Delivery: 25 LPM Height: 162.6 cm (5' 4.02\") Weight: 66.1 kg (145 lb 11.6 oz)  I/O:     Intake/Output Summary (Last 24 hours) at 11/28/2021 1606  Last data filed at 11/28/2021 1200  Gross per 24 hour   Intake 2335 ml   Output 2975 ml   Net -640 ml     Constitutional: lying in bed, in no apparent distress.   HEENT: Eyes with pink conjunctivae, anicteric.  Oral mucosa moist without lesions.  Trache cdi  PULM: Fair air flow bilaterally.  Bibasilar crackles, no rhonchi, no wheezes.  Non-labored breathing on TD.  CV: Normal S1 and S2.  RRR.  No murmur, gallop, or rub.  No peripheral edema.   ABD: NABS, soft, nontender, nondistended.  PEG/J not visualized  MSK: Able to squeeze left " > right hand and move BLE.  + apparent muscle wasting.   NEURO: Alert, follows commands, nonverbal communication by head nod/shake  SKIN: Warm, dry.  No rash on limited exam.   PSYCH: Mood stable.     Lines, Drains, and Devices:  Peripheral IV 11/02/21 Anterior;Right Upper forearm (Active)   Site Assessment WDL 11/22/21 0800   Line Status Saline locked 11/22/21 0800   Dressing Intervention Dressing reinforced 11/08/21 2000   Phlebitis Scale 0-->no symptoms 11/22/21 0800   Infiltration Scale 0 11/22/21 0800   Infiltration Site Treatment Method  None 11/21/21 0400   Number of days: 20       PICC Triple Lumen 11/04/21 Left Basilic Access. PICC okay to use. (Active)   Site Assessment [facility] 11/22/21 0800   External Cath Length (cm) 1 cm 11/04/21 1747   Extremity Circumference (cm) 28 cm 11/21/21 0000   Dressing Intervention Chlorhexidine patch;Transparent 11/22/21 0800   Dressing Change Due 11/28/21 11/22/21 0800   Mosley - Status saline locked 11/22/21 0800   Mosley - Cap Change Due 11/23/21 11/22/21 0800   Red - Status infusing 11/22/21 0800   Red - Cap Change Due 11/23/21 11/22/21 0800   White - Status saline locked 11/22/21 0800   White - Cap Change Due 11/23/21 11/22/21 0800   Extravasation? No 11/17/21 2000   Line Necessity Yes, meets criteria 11/22/21 0800   Number of days: 18     Data:     LABS    CMP:   Recent Labs   Lab 11/28/21  1207 11/28/21  0604 11/28/21  0414 11/28/21  0001 11/27/21  2046 11/27/21  1625 11/27/21  0753 11/27/21  0732 11/26/21  1808 11/26/21  1708 11/26/21  0841 11/26/21  0603 11/25/21  0800 11/25/21  0544   NA  --  140  140  --   --   --  140  --  139  139  --  142  --  144  144   < > 144  144   POTASSIUM  --  4.1  4.1  --   --   --  4.3  --  4.2  4.2  --  3.9  --  4.2  4.2   < > 4.0  4.0   CHLORIDE  --  104  104  --   --   --  103  --  106  106  --  106  --  108  108   < > 108  108   CO2  --  32  32  --   --   --  30  --  29  29  --  27  --  28  28   < > 28  28   ANIONGAP  --  4   4  --   --   --  7  --  4  4  --  9  --  8  8   < > 8  8   * 169*  169* 127* 199*   < > 152*   < > 159*  159*   < > 212*   < > 165*  165*   < > 190*  190*   BUN  --  60*  60*  --   --   --  64*  --  65*  65*  --  70*  --  71*  71*   < > 67*  67*   CR  --  0.78  0.78  --   --   --  0.80  --  0.72  0.72  --  0.83  --  0.82  0.82   < > 0.82  0.82   GFRESTIMATED  --  >90  >90  --   --   --  >90  --  >90  >90  --  >90  --  >90  >90   < > >90  >90   ERIK  --  9.1  9.1  --   --   --  9.0  --  9.0  9.0  --  9.1  --  9.4  9.4   < > 9.1  9.1   MAG  --  1.6  --   --   --   --   --  1.5*  --   --   --  1.8  --  2.1   PHOS  --  3.1  --   --   --   --   --  2.9  --   --   --  2.7  --  2.9   PROTTOTAL  --  5.8*  --   --   --   --   --  5.9*  --   --   --  6.4*  --  6.4*   ALBUMIN  --  2.1*  --   --   --   --   --  2.3*  --   --   --  2.5*  --  2.4*   BILITOTAL  --  0.2  --   --   --   --   --  0.2  --   --   --  0.2  --  0.2   ALKPHOS  --  114  --   --   --   --   --  133  --   --   --  141  --  137   AST  --  25  --   --   --   --   --  22  --   --   --  26  --  27   ALT  --  25  --   --   --   --   --  27  --   --   --  30  --  32    < > = values in this interval not displayed.     CBC:   Recent Labs   Lab 11/28/21  0604 11/27/21  0732 11/26/21  0603 11/25/21  0544   WBC 11.7* 14.3* 14.5* 11.9*   RBC 3.00* 3.26* 3.36* 3.31*   HGB 9.3* 9.9* 10.3* 10.1*   HCT 29.1* 32.5* 33.7* 33.2*   MCV 97 100 100 100   MCH 31.0 30.4 30.7 30.5   MCHC 32.0 30.5* 30.6* 30.4*   RDW 15.9* 16.0* 16.0* 16.5*   PLT 71* 78* 72* 66*       INR:   Recent Labs   Lab 11/28/21  0604 11/27/21  0732 11/26/21  1708 11/26/21  0603   INR 1.43* 1.51* 1.75* 2.35*       Glucose:   Recent Labs   Lab 11/28/21  1207 11/28/21  0604 11/28/21  0414 11/28/21  0001 11/27/21  2046 11/27/21  1625   * 169*  169* 127* 199* 110* 152*       Blood Gas:   Recent Labs   Lab 11/22/21  0426 11/22/21  0103   PHV 7.37 7.41   PCO2V 43 42   PO2V 34 36    HCO3V 25 27   LORI -0.4 1.9   O2PER 40 40       Culture Data No results for input(s): CULT in the last 168 hours.    Virology Data:   Lab Results   Component Value Date    FLUAH1 Negative 11/22/2021    FLUAH3 Negative 11/22/2021    IO7557 Negative 11/22/2021    IFLUB Negative 11/22/2021    RSVA Negative 11/22/2021    RSVB Negative 11/22/2021    PIV1 Negative 11/22/2021    PIV2 Negative 11/22/2021    PIV3 Negative 11/22/2021    HMPV Negative 11/22/2021    HRVS Negative 11/22/2021    ADVBE Negative 11/22/2021    ADVC Negative 11/22/2021    ADVC Negative 11/12/2021    ADVC Negative 10/17/2021       Historical CMV results (last 3 of prior testing):  Lab Results   Component Value Date    CMVQNT Not Detected 11/22/2021    CMVQNT Not Detected 11/16/2021    CMVQNT Not Detected 11/12/2021     No results found for: CMVLOG    Urine Studies    Recent Labs   Lab Test 11/01/21  1336 10/25/21  1507   URINEPH 5.5 5.5   NITRITE Negative Negative   LEUKEST Negative Negative   WBCU 0 2       Most Recent [facility] Pulmonary Function Testing (FVC/FEV1 only)  No results found for: 20002  No results found for: 20003  No results found for: 20015  No results found for: 20016    IMAGING    Recent Results (from the past 48 hour(s))   XR Chest Port 1 View    Narrative    Exam: XR CHEST PORT 1 VIEW, 11/21/2021 4:28 AM    Comparison: Chest x-ray 11/20/2021    History: s/p lung transplant    Findings:  Single portable AP semiupright view of the chest is obtained.  Postoperative changes of bilateral lung transplantation, clamshell  sternotomy wires are intact. Tracheostomy tube tip is in the thoracic  trachea. Left upper extremity PICC tip terminates in the superior  cavoatrial junction. Left basilar chest tube in place.    Trachea is midline. Mediastinum is within normal limits.  Cardiopulmonary silhouette is within normal limits. Trace bilateral  effusions and associated atelectasis. No appreciable pneumothorax. The  upper abdomen is  unremarkable.      Impression    Impression:   1. Slightly reduced bilateral effusions.  2. Reduced left basilar atelectasis.  3. Slightly increased right basilar atelectasis.    I have personally reviewed the examination and initial interpretation  and I agree with the findings.    SHANDRA CEVALLOS MD         SYSTEM ID:  [id number]   CT Chest w/o Contrast    Narrative    EXAMINATION: CT CHEST W/O CONTRAST, 11/22/2021 6:02 AM    CLINICAL HISTORY: Abnormal xray - pleural effusion    COMPARISON: Radiograph 11/21/2021, 11/20/2021. CT 11/2/2021.    TECHNIQUE: CT imaging obtained through the chest without contrast.  Coronal, sagittal and axial MIP reformatted images obtained and  reviewed.     FINDINGS:    Lines, tubes, devices: Tracheostomy in place. Left upper extremity  PICC terminating at the superior cavoatrial junction. Left basilar  chest tube. Median sternotomy wires are intact.    Lungs: Post surgical changes of bilateral lung transplantation. The  central tracheobronchial tree is patent. Peripheral mucous plugging in  the bilateral lower lobes. Multifocal groundglass opacities some of  which are new compared to CT 11/2/2021. Interval development of a  cavitary lesion of the left lung base measuring 2.7 x 2.9 cm (series 6  image 190).  Decreased bilateral loculated pleural effusions with  associated compressive atelectasis.    Mediastinum: The visualized thyroid is unremarkable.Heart size is  enlarged. Small pericardial effusion. Mild coronary artery  calcifications. The thoracic aorta and main pulmonary artery are  within normal limits. Standard branching pattern of the great vessels.  No abnormal thoracic lymph nodes.    Bones and soft tissues: Subacute bilateral rib fractures. 1.2 x 1.8 cm  fluid collection in the left axilla with surrounding fat stranding  (series 2 image 27). No suspicious osseous lesion. Mild degenerative  changes of the thoracolumbar spine.    Upper abdomen:  Limited evaluation of the upper  abdomen. No acute  pathology of the visualized solid organs. Percutaneous  gastrojejunostomy tube.      Impression    IMPRESSION:   1. New cavitary lesion in the left lung base with multifocal  groundglass opacities some of which are new compared to CT 11/2/2021,  notably in the bilateral upper lobes concerning for ongoing infection.  2. Decreased bilateral loculated pleural effusions.  3. New 1.8 cm fluid collection with surrounding fat stranding in the  left axilla.  4. Similar small pericardial effusion.    I have personally reviewed the examination and initial interpretation  and I agree with the findings.    ARIES DELGADO MD         SYSTEM ID:  [id number]

## 2021-11-29 ENCOUNTER — APPOINTMENT (OUTPATIENT)
Dept: GENERAL RADIOLOGY | Facility: CLINIC | Age: 56
End: 2021-11-29
Attending: NURSE PRACTITIONER
Payer: COMMERCIAL

## 2021-11-29 ENCOUNTER — APPOINTMENT (OUTPATIENT)
Dept: SPEECH THERAPY | Facility: CLINIC | Age: 56
End: 2021-11-29
Attending: STUDENT IN AN ORGANIZED HEALTH CARE EDUCATION/TRAINING PROGRAM
Payer: COMMERCIAL

## 2021-11-29 ENCOUNTER — APPOINTMENT (OUTPATIENT)
Dept: PHYSICAL THERAPY | Facility: CLINIC | Age: 56
End: 2021-11-29
Attending: STUDENT IN AN ORGANIZED HEALTH CARE EDUCATION/TRAINING PROGRAM
Payer: COMMERCIAL

## 2021-11-29 LAB
ALBUMIN SERPL-MCNC: 2.2 G/DL (ref 3.4–5)
ALP SERPL-CCNC: 108 U/L (ref 40–150)
ALT SERPL W P-5'-P-CCNC: 25 U/L (ref 0–70)
ANION GAP SERPL CALCULATED.3IONS-SCNC: 4 MMOL/L (ref 3–14)
ANION GAP SERPL CALCULATED.3IONS-SCNC: 4 MMOL/L (ref 3–14)
ANION GAP SERPL CALCULATED.3IONS-SCNC: 6 MMOL/L (ref 3–14)
AST SERPL W P-5'-P-CCNC: 26 U/L (ref 0–45)
BACTERIA SPEC CULT: ABNORMAL
BILIRUB SERPL-MCNC: 0.2 MG/DL (ref 0.2–1.3)
BUN SERPL-MCNC: 58 MG/DL (ref 7–30)
BUN SERPL-MCNC: 60 MG/DL (ref 7–30)
BUN SERPL-MCNC: 60 MG/DL (ref 7–30)
CALCIUM SERPL-MCNC: 9 MG/DL (ref 8.5–10.1)
CALCIUM SERPL-MCNC: 9.1 MG/DL (ref 8.5–10.1)
CALCIUM SERPL-MCNC: 9.1 MG/DL (ref 8.5–10.1)
CHLORIDE BLD-SCNC: 101 MMOL/L (ref 94–109)
CHLORIDE BLD-SCNC: 101 MMOL/L (ref 94–109)
CHLORIDE BLD-SCNC: 102 MMOL/L (ref 94–109)
CO2 SERPL-SCNC: 30 MMOL/L (ref 20–32)
CO2 SERPL-SCNC: 32 MMOL/L (ref 20–32)
CO2 SERPL-SCNC: 32 MMOL/L (ref 20–32)
CREAT SERPL-MCNC: 0.87 MG/DL (ref 0.66–1.25)
CREAT SERPL-MCNC: 0.93 MG/DL (ref 0.66–1.25)
CREAT SERPL-MCNC: 0.93 MG/DL (ref 0.66–1.25)
ERYTHROCYTE [DISTWIDTH] IN BLOOD BY AUTOMATED COUNT: 15.7 % (ref 10–15)
GFR SERPL CREATININE-BSD FRML MDRD: >90 ML/MIN/1.73M2
GLUCOSE BLD-MCNC: 137 MG/DL (ref 70–99)
GLUCOSE BLD-MCNC: 137 MG/DL (ref 70–99)
GLUCOSE BLD-MCNC: 206 MG/DL (ref 70–99)
GLUCOSE BLDC GLUCOMTR-MCNC: 137 MG/DL (ref 70–99)
GLUCOSE BLDC GLUCOMTR-MCNC: 139 MG/DL (ref 70–99)
GLUCOSE BLDC GLUCOMTR-MCNC: 155 MG/DL (ref 70–99)
GLUCOSE BLDC GLUCOMTR-MCNC: 170 MG/DL (ref 70–99)
GLUCOSE BLDC GLUCOMTR-MCNC: 201 MG/DL (ref 70–99)
GLUCOSE BLDC GLUCOMTR-MCNC: 219 MG/DL (ref 70–99)
GLUCOSE BLDC GLUCOMTR-MCNC: 231 MG/DL (ref 70–99)
HCT VFR BLD AUTO: 29.5 % (ref 40–53)
HGB BLD-MCNC: 9.2 G/DL (ref 13.3–17.7)
INR PPP: 1.31 (ref 0.85–1.15)
LACTATE SERPL-SCNC: 1.4 MMOL/L (ref 0.7–2)
LYMPHOCYTES NFR FLD MANUAL: 96 %
Lab: NORMAL
MAGNESIUM SERPL-MCNC: 1.7 MG/DL (ref 1.6–2.3)
MCH RBC QN AUTO: 30.7 PG (ref 26.5–33)
MCHC RBC AUTO-ENTMCNC: 31.2 G/DL (ref 31.5–36.5)
MCV RBC AUTO: 98 FL (ref 78–100)
MONOS+MACROS NFR FLD MANUAL: 3 %
NEUTS BAND NFR FLD MANUAL: 1 %
PERFORMING LABORATORY: NORMAL
PHOSPHATE SERPL-MCNC: 3.7 MG/DL (ref 2.5–4.5)
PLATELET # BLD AUTO: 76 10E3/UL (ref 150–450)
POTASSIUM BLD-SCNC: 4.3 MMOL/L (ref 3.4–5.3)
POTASSIUM BLD-SCNC: 4.3 MMOL/L (ref 3.4–5.3)
POTASSIUM BLD-SCNC: 4.4 MMOL/L (ref 3.4–5.3)
PROT SERPL-MCNC: 5.7 G/DL (ref 6.8–8.8)
RBC # BLD AUTO: 3 10E6/UL (ref 4.4–5.9)
SODIUM SERPL-SCNC: 137 MMOL/L (ref 133–144)
SODIUM SERPL-SCNC: 137 MMOL/L (ref 133–144)
SODIUM SERPL-SCNC: 138 MMOL/L (ref 133–144)
TACROLIMUS BLD-MCNC: 7 UG/L (ref 5–15)
TEST NAME: NORMAL
TME LAST DOSE: NORMAL H
TME LAST DOSE: NORMAL H
WBC # BLD AUTO: 12.2 10E3/UL (ref 4–11)

## 2021-11-29 PROCEDURE — 80048 BASIC METABOLIC PNL TOTAL CA: CPT

## 2021-11-29 PROCEDURE — 83605 ASSAY OF LACTIC ACID: CPT | Performed by: INTERNAL MEDICINE

## 2021-11-29 PROCEDURE — 250N000013 HC RX MED GY IP 250 OP 250 PS 637: Performed by: THORACIC SURGERY (CARDIOTHORACIC VASCULAR SURGERY)

## 2021-11-29 PROCEDURE — 120N000003 HC R&B IMCU UMMC

## 2021-11-29 PROCEDURE — 250N000012 HC RX MED GY IP 250 OP 636 PS 637: Performed by: PHYSICIAN ASSISTANT

## 2021-11-29 PROCEDURE — 99233 SBSQ HOSP IP/OBS HIGH 50: CPT | Mod: 24 | Performed by: INTERNAL MEDICINE

## 2021-11-29 PROCEDURE — 86833 HLA CLASS II HIGH DEFIN QUAL: CPT | Performed by: NURSE PRACTITIONER

## 2021-11-29 PROCEDURE — 250N000009 HC RX 250: Performed by: INTERNAL MEDICINE

## 2021-11-29 PROCEDURE — 250N000013 HC RX MED GY IP 250 OP 250 PS 637: Performed by: NURSE PRACTITIONER

## 2021-11-29 PROCEDURE — 250N000011 HC RX IP 250 OP 636: Performed by: STUDENT IN AN ORGANIZED HEALTH CARE EDUCATION/TRAINING PROGRAM

## 2021-11-29 PROCEDURE — 94640 AIRWAY INHALATION TREATMENT: CPT | Mod: 76

## 2021-11-29 PROCEDURE — 250N000013 HC RX MED GY IP 250 OP 250 PS 637: Performed by: STUDENT IN AN ORGANIZED HEALTH CARE EDUCATION/TRAINING PROGRAM

## 2021-11-29 PROCEDURE — 71045 X-RAY EXAM CHEST 1 VIEW: CPT

## 2021-11-29 PROCEDURE — 250N000012 HC RX MED GY IP 250 OP 636 PS 637: Performed by: INTERNAL MEDICINE

## 2021-11-29 PROCEDURE — 85610 PROTHROMBIN TIME: CPT | Performed by: THORACIC SURGERY (CARDIOTHORACIC VASCULAR SURGERY)

## 2021-11-29 PROCEDURE — 999N000043 HC STATISTIC CTO2 CONT OXYGEN TECH TIME EA 90 MIN

## 2021-11-29 PROCEDURE — 250N000013 HC RX MED GY IP 250 OP 250 PS 637

## 2021-11-29 PROCEDURE — 92507 TX SP LANG VOICE COMM INDIV: CPT | Mod: GN

## 2021-11-29 PROCEDURE — 97530 THERAPEUTIC ACTIVITIES: CPT | Mod: GP

## 2021-11-29 PROCEDURE — 71045 X-RAY EXAM CHEST 1 VIEW: CPT | Mod: 26 | Performed by: RADIOLOGY

## 2021-11-29 PROCEDURE — 36592 COLLECT BLOOD FROM PICC: CPT | Performed by: INTERNAL MEDICINE

## 2021-11-29 PROCEDURE — 999N000215 HC STATISTIC HFNC ADULT NON-CPAP

## 2021-11-29 PROCEDURE — 999N000157 HC STATISTIC RCP TIME EA 10 MIN

## 2021-11-29 PROCEDURE — 86832 HLA CLASS I HIGH DEFIN QUAL: CPT | Performed by: NURSE PRACTITIONER

## 2021-11-29 PROCEDURE — 250N000013 HC RX MED GY IP 250 OP 250 PS 637: Performed by: INTERNAL MEDICINE

## 2021-11-29 PROCEDURE — 250N000013 HC RX MED GY IP 250 OP 250 PS 637: Performed by: ANESTHESIOLOGY

## 2021-11-29 PROCEDURE — 94668 MNPJ CHEST WALL SBSQ: CPT

## 2021-11-29 PROCEDURE — 36592 COLLECT BLOOD FROM PICC: CPT | Performed by: NURSE PRACTITIONER

## 2021-11-29 PROCEDURE — 83735 ASSAY OF MAGNESIUM: CPT | Performed by: THORACIC SURGERY (CARDIOTHORACIC VASCULAR SURGERY)

## 2021-11-29 PROCEDURE — 84100 ASSAY OF PHOSPHORUS: CPT | Performed by: THORACIC SURGERY (CARDIOTHORACIC VASCULAR SURGERY)

## 2021-11-29 PROCEDURE — 250N000011 HC RX IP 250 OP 636: Performed by: INTERNAL MEDICINE

## 2021-11-29 PROCEDURE — 250N000011 HC RX IP 250 OP 636: Performed by: PHYSICIAN ASSISTANT

## 2021-11-29 PROCEDURE — 85027 COMPLETE CBC AUTOMATED: CPT | Performed by: THORACIC SURGERY (CARDIOTHORACIC VASCULAR SURGERY)

## 2021-11-29 PROCEDURE — 80197 ASSAY OF TACROLIMUS: CPT | Performed by: INTERNAL MEDICINE

## 2021-11-29 PROCEDURE — 250N000011 HC RX IP 250 OP 636: Performed by: ANESTHESIOLOGY

## 2021-11-29 PROCEDURE — 94640 AIRWAY INHALATION TREATMENT: CPT

## 2021-11-29 RX ORDER — OXYCODONE HYDROCHLORIDE 5 MG/1
5-10 TABLET ORAL EVERY 4 HOURS PRN
Status: DISCONTINUED | OUTPATIENT
Start: 2021-11-29 | End: 2021-12-13 | Stop reason: HOSPADM

## 2021-11-29 RX ORDER — WARFARIN SODIUM 2 MG/1
2 TABLET ORAL
Status: COMPLETED | OUTPATIENT
Start: 2021-11-29 | End: 2021-11-29

## 2021-11-29 RX ORDER — MAGNESIUM OXIDE 400 MG/1
400 TABLET ORAL 2 TIMES DAILY
Status: COMPLETED | OUTPATIENT
Start: 2021-11-29 | End: 2021-11-30

## 2021-11-29 RX ORDER — GUAR GUM
1 PACKET (EA) ORAL 2 TIMES DAILY
Status: DISCONTINUED | OUTPATIENT
Start: 2021-11-29 | End: 2021-12-13 | Stop reason: HOSPADM

## 2021-11-29 RX ORDER — HYDROMORPHONE HYDROCHLORIDE 1 MG/ML
0.3 INJECTION, SOLUTION INTRAMUSCULAR; INTRAVENOUS; SUBCUTANEOUS
Status: DISCONTINUED | OUTPATIENT
Start: 2021-11-29 | End: 2021-12-01

## 2021-11-29 RX ADMIN — NYSTATIN 1000000 UNITS: 500000 SUSPENSION ORAL at 17:05

## 2021-11-29 RX ADMIN — ACETYLCYSTEINE 2 ML: 200 SOLUTION ORAL; RESPIRATORY (INHALATION) at 09:00

## 2021-11-29 RX ADMIN — HYDROMORPHONE HYDROCHLORIDE 0.3 MG: 1 INJECTION, SOLUTION INTRAMUSCULAR; INTRAVENOUS; SUBCUTANEOUS at 13:43

## 2021-11-29 RX ADMIN — Medication 1 PACKET: at 20:10

## 2021-11-29 RX ADMIN — LEVALBUTEROL HYDROCHLORIDE 1.25 MG: 1.25 SOLUTION RESPIRATORY (INHALATION) at 21:01

## 2021-11-29 RX ADMIN — INSULIN GLARGINE 30 UNITS: 100 INJECTION, SOLUTION SUBCUTANEOUS at 09:28

## 2021-11-29 RX ADMIN — HYDROXYZINE HYDROCHLORIDE 50 MG: 50 TABLET, FILM COATED ORAL at 15:20

## 2021-11-29 RX ADMIN — LEVALBUTEROL HYDROCHLORIDE 1.25 MG: 1.25 SOLUTION RESPIRATORY (INHALATION) at 08:59

## 2021-11-29 RX ADMIN — OXYCODONE HYDROCHLORIDE 10 MG: 5 TABLET ORAL at 19:56

## 2021-11-29 RX ADMIN — INSULIN ASPART 4 UNITS: 100 INJECTION, SOLUTION INTRAVENOUS; SUBCUTANEOUS at 20:12

## 2021-11-29 RX ADMIN — FUROSEMIDE 40 MG: 10 INJECTION INTRAMUSCULAR; INTRAVENOUS at 17:05

## 2021-11-29 RX ADMIN — QUETIAPINE FUMARATE 25 MG: 25 TABLET ORAL at 05:27

## 2021-11-29 RX ADMIN — HYDROMORPHONE HYDROCHLORIDE 0.3 MG: 1 INJECTION, SOLUTION INTRAMUSCULAR; INTRAVENOUS; SUBCUTANEOUS at 20:20

## 2021-11-29 RX ADMIN — INSULIN GLARGINE 30 UNITS: 100 INJECTION, SOLUTION SUBCUTANEOUS at 20:12

## 2021-11-29 RX ADMIN — OXYCODONE HYDROCHLORIDE 5 MG: 5 TABLET ORAL at 03:22

## 2021-11-29 RX ADMIN — ROSUVASTATIN CALCIUM 10 MG: 10 TABLET, FILM COATED ORAL at 09:23

## 2021-11-29 RX ADMIN — Medication 10 MG: at 19:56

## 2021-11-29 RX ADMIN — HYDROXYZINE HYDROCHLORIDE 25 MG: 25 TABLET, FILM COATED ORAL at 03:22

## 2021-11-29 RX ADMIN — QUETIAPINE FUMARATE 25 MG: 25 TABLET ORAL at 09:27

## 2021-11-29 RX ADMIN — INSULIN ASPART 2 UNITS: 100 INJECTION, SOLUTION INTRAVENOUS; SUBCUTANEOUS at 09:17

## 2021-11-29 RX ADMIN — HYDROXYZINE HYDROCHLORIDE 50 MG: 50 TABLET, FILM COATED ORAL at 19:54

## 2021-11-29 RX ADMIN — SODIUM CHLORIDE, PRESERVATIVE FREE 5 ML: 5 INJECTION INTRAVENOUS at 18:02

## 2021-11-29 RX ADMIN — WARFARIN SODIUM 2 MG: 2 TABLET ORAL at 18:00

## 2021-11-29 RX ADMIN — CEFTAZIDIME 2 G: 2 INJECTION, POWDER, FOR SOLUTION INTRAVENOUS at 19:53

## 2021-11-29 RX ADMIN — QUETIAPINE FUMARATE 25 MG: 25 TABLET ORAL at 13:44

## 2021-11-29 RX ADMIN — LIDOCAINE 2 PATCH: 560 PATCH PERCUTANEOUS; TOPICAL; TRANSDERMAL at 09:22

## 2021-11-29 RX ADMIN — CALCIUM CARBONATE 600 MG (1,500 MG)-VITAMIN D3 400 UNIT TABLET 1 TABLET: at 18:00

## 2021-11-29 RX ADMIN — Medication 400 MG: at 09:24

## 2021-11-29 RX ADMIN — FUROSEMIDE 40 MG: 10 INJECTION INTRAMUSCULAR; INTRAVENOUS at 09:25

## 2021-11-29 RX ADMIN — AMLODIPINE BESYLATE 5 MG: 2.5 TABLET ORAL at 09:23

## 2021-11-29 RX ADMIN — NYSTATIN 1000000 UNITS: 500000 SUSPENSION ORAL at 19:53

## 2021-11-29 RX ADMIN — MYCOPHENOLATE MOFETIL 1000 MG: 200 POWDER, FOR SUSPENSION ORAL at 19:53

## 2021-11-29 RX ADMIN — PREDNISONE 12.5 MG: 2.5 TABLET ORAL at 09:23

## 2021-11-29 RX ADMIN — ACETYLCYSTEINE 2 ML: 200 SOLUTION ORAL; RESPIRATORY (INHALATION) at 21:01

## 2021-11-29 RX ADMIN — TACROLIMUS 2 MG: 5 CAPSULE ORAL at 09:28

## 2021-11-29 RX ADMIN — LEVOFLOXACIN 750 MG: 5 INJECTION, SOLUTION INTRAVENOUS at 17:05

## 2021-11-29 RX ADMIN — TACROLIMUS 2 MG: 5 CAPSULE ORAL at 18:00

## 2021-11-29 RX ADMIN — QUETIAPINE FUMARATE 25 MG: 25 TABLET ORAL at 19:55

## 2021-11-29 RX ADMIN — INSULIN ASPART 4 UNITS: 100 INJECTION, SOLUTION INTRAVENOUS; SUBCUTANEOUS at 17:04

## 2021-11-29 RX ADMIN — LEVOTHYROXINE SODIUM 25 MCG: 0.03 TABLET ORAL at 09:23

## 2021-11-29 RX ADMIN — PREDNISONE 10 MG: 5 TABLET ORAL at 19:54

## 2021-11-29 RX ADMIN — QUETIAPINE FUMARATE 50 MG: 50 TABLET ORAL at 19:56

## 2021-11-29 RX ADMIN — Medication 40 MG: at 09:25

## 2021-11-29 RX ADMIN — Medication 1 TABLET: at 09:24

## 2021-11-29 RX ADMIN — INSULIN ASPART 1 UNITS: 100 INJECTION, SOLUTION INTRAVENOUS; SUBCUTANEOUS at 13:10

## 2021-11-29 RX ADMIN — NYSTATIN 1000000 UNITS: 500000 SUSPENSION ORAL at 09:25

## 2021-11-29 RX ADMIN — MYCOPHENOLATE MOFETIL 1000 MG: 200 POWDER, FOR SUSPENSION ORAL at 09:25

## 2021-11-29 RX ADMIN — MULTIVIT AND MINERALS-FERROUS GLUCONATE 9 MG IRON/15 ML ORAL LIQUID 15 ML: at 09:25

## 2021-11-29 RX ADMIN — FLUCONAZOLE IN SODIUM CHLORIDE 400 MG: 2 INJECTION, SOLUTION INTRAVENOUS at 09:29

## 2021-11-29 RX ADMIN — Medication 400 MG: at 19:55

## 2021-11-29 RX ADMIN — CEFTAZIDIME 2 G: 2 INJECTION, POWDER, FOR SOLUTION INTRAVENOUS at 05:27

## 2021-11-29 RX ADMIN — ACETAMINOPHEN 975 MG: 325 TABLET, FILM COATED ORAL at 18:00

## 2021-11-29 RX ADMIN — OXYCODONE HYDROCHLORIDE 5 MG: 5 TABLET ORAL at 13:00

## 2021-11-29 RX ADMIN — CEFTAZIDIME 2 G: 2 INJECTION, POWDER, FOR SOLUTION INTRAVENOUS at 13:10

## 2021-11-29 RX ADMIN — CALCIUM CARBONATE 600 MG (1,500 MG)-VITAMIN D3 400 UNIT TABLET 1 TABLET: at 09:24

## 2021-11-29 RX ADMIN — OXYCODONE HYDROCHLORIDE 10 MG: 5 TABLET ORAL at 23:31

## 2021-11-29 RX ADMIN — NYSTATIN 1000000 UNITS: 500000 SUSPENSION ORAL at 13:11

## 2021-11-29 RX ADMIN — Medication 40 MG: at 19:53

## 2021-11-29 RX ADMIN — ESCITALOPRAM 5 MG: 5 TABLET, FILM COATED ORAL at 09:24

## 2021-11-29 RX ADMIN — SULFAMETHOXAZOLE AND TRIMETHOPRIM 1 TABLET: 400; 80 TABLET ORAL at 09:24

## 2021-11-29 RX ADMIN — ACETAMINOPHEN 975 MG: 325 TABLET, FILM COATED ORAL at 09:23

## 2021-11-29 RX ADMIN — HYDROXYZINE HYDROCHLORIDE 50 MG: 50 TABLET, FILM COATED ORAL at 09:24

## 2021-11-29 ASSESSMENT — ACTIVITIES OF DAILY LIVING (ADL)
ADLS_ACUITY_SCORE: 25
ADLS_ACUITY_SCORE: 25
ADLS_ACUITY_SCORE: 23
ADLS_ACUITY_SCORE: 23
ADLS_ACUITY_SCORE: 25
ADLS_ACUITY_SCORE: 23
ADLS_ACUITY_SCORE: 25
ADLS_ACUITY_SCORE: 23
ADLS_ACUITY_SCORE: 25
ADLS_ACUITY_SCORE: 23
ADLS_ACUITY_SCORE: 25

## 2021-11-29 ASSESSMENT — MIFFLIN-ST. JEOR: SCORE: 1403.25

## 2021-11-29 NOTE — PLAN OF CARE
"Neuro: A&Ox4. BUE weakness.   Cardiac: SR-ST. Afebrile.   Respiratory: Sating >90% on trach dome at 25L, 21%.. Bivona 6. Pt tolerated speaking valve from 1100-end of shift. Plan to decrease inner cannula size and try a cap trial later this week. Suctioned 3-4 times total, no secretions.   GI/: Adequate urine output, pt using urinal. 3-4 BMs, formed and loose.   Diet/appetite: Tube feed regimen changed per dietician. New plan tube feeds at 80mL/hr from 4150-4873. Reg diet and calorie counting during the day. Plan to increase oral intake. Tolerating increase in tube feeds well, no reported nausea.   Activity:  Assist of 2 pivot with gait belt. Up in chair for 1 hour.   Pain: Pt reported pain 6/10 at start of shift. Up in chair, pt reported 8/10 pain. Primary team notified, PRN oxy adjusted, PRN IV dilaudid added. Pt pain now manageable on current regimen.   Skin: No new deficits noted. Barrier cream used on coccyx.   LDA's: L PICC. Chest tube -20 to suction. Minimal output throughout shift.      Pt reported to RN blurry vision spot. Pt reported vision loss as a \"dead space\" that has been there since he had the chicken pox over 40 years ago. Pt encouraged to work with PT/OT to manage baseline vision loss.     Plan: Pending output from chest tube, plan to pull chest tube tomorrow 11/30. Continue to increase oral intake, manage nausea, tolerate cyclic tube feeds. Continue to increase use of trach speaking valve. Stay on top of pain regimen.    Francheska Brady RN     "

## 2021-11-29 NOTE — PLAN OF CARE
"Trach dome at 25% FiO2, 25L. Speaking valve during the day when awake, tolerated well. Capping trial done by speech therapy today without any problems. Possibly downsizing trach. HR 80s-90s, tachycardic with right rib pain. Repositioning, ice packs, oxy and Tylenol found effective. Ate 50% of breakfast, tolerated well with no nausea. Cultures pending from thoracentesis yesterday. Loose/watery, incontinent stools x 3. Responding well after morning dose of lasix. Supportive wife at bedside. Continue plan of care.     /83 (BP Location: Right arm)   Pulse 98   Temp 98.7  F (37.1  C) (Oral)   Resp 17   Ht 1.626 m (5' 4.02\")   Wt 66.1 kg (145 lb 11.6 oz)   SpO2 94%   BMI 25.00 kg/m          "

## 2021-11-29 NOTE — PROGRESS NOTES
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 Corral. Prolonged vent wean s/p trach and PEG/J tube placement with thoracic surgery 10/29.  Post-op course otherwise complicated by encephalopathy and diffuse weakness, acute to subacute CVA, afib with RVR, BRENNAN, GI bleed, Candidemia/Candida empyema, and anxiety.  Code called 11/2 for bradycardia/asystole, progressive hypotensive, found to have significant GI bleed, EGD with adherent clot near PEG tube site that was clipped.  Tolerating TD trials since 11/20 and ongoing improvement in mentation and weakness.  Patient has continued to improve and transferred to the floor 11/23.  Medicine team primary, CVTS following for incision and chest tube management.     - Currently being treated with ceftazidime, levaquin and fluconazole per transplant ID.   - All surgical drains have been removed  - CT chest 11/22 showed new cavitary lesion in the left lung base, and with multifocal bilateral upper lobe GGO (some of which are new), new 1.8 cm fluid collection with surrounding fat stranding in the left axilla, decreased bilateral loculated pleural effusions.  - Left pigtail placed 11/3 for empyema/pleural effusion  - Keeping pigtail until at least completion of antifungal, also starting lytics per pulm 11/25 for 3 days  - CXR pending   - Keep Chest tube another day, if less than 100 ml out in 24 hours, consider removing if okay with pulm as well.   - Other cares per medicine and pulmonary team    D/w Dr. Shayna Colón, DNP, CNP  [facility] Cardiothoracic Surgery  O: [phone]  Pgr [phone]          Interval History:   No acute events overnight. Slept good. No agitation or delirium. States pain is well managed on current regimen. No acute complaints.           Medications:       acetylcysteine  2 mL Nebulization BID     amLODIPine  5 mg Per G Tube Daily     calcium carbonate 600 mg-vitamin D 400 units  1 tablet Oral or Feeding Tube BID w/meals     cefTAZidime  2 g Intravenous Q8H     escitalopram  5 mg Oral or Feeding Tube Daily     fluconazole  400 mg Intravenous Q24H     folic acid-vit B6-vit B12  1 tablet Per G Tube Daily     furosemide  40 mg Intravenous BID     heparin lock flush  5-20 mL Intracatheter Q24H     insulin aspart  1-12 Units Subcutaneous Q4H     insulin glargine  30 Units Subcutaneous BID     levalbuterol  1.25 mg Nebulization BID     levofloxacin  750 mg Intravenous Q24H     levothyroxine  25 mcg Oral Daily     lidocaine  2 patch Transdermal Q24H     lidocaine   Transdermal Q8H     magnesium oxide  400 mg Oral BID     melatonin  10 mg Oral QPM     multivitamins w/minerals  15 mL Per Feeding Tube Daily     mycophenolate  1,000 mg Per J Tube BID     nystatin  1,000,000 Units Swish & Swallow 4x Daily     pantoprazole  40 mg Per Feeding Tube BID     predniSONE  10 mg Per J Tube QPM     predniSONE  12.5 mg Per J Tube Daily     QUEtiapine  25 mg Oral or Feeding Tube BID     QUEtiapine  50 mg Oral At Bedtime     rosuvastatin  10 mg Oral or Feeding Tube Daily     sodium chloride (PF)  10-40 mL Intracatheter Q8H     sodium chloride (PF)  3 mL Intracatheter Q8H     sulfamethoxazole-trimethoprim  1 tablet Oral or Feeding Tube Daily     tacrolimus  2 mg Oral QPM     tacrolimus  2 mg Oral QAM     acetaminophen, albuterol, bisacodyl, dextrose, glucose **OR** dextrose **OR** glucagon, heparin lock flush, hydrALAZINE, hydrOXYzine **OR** hydrOXYzine, labetalol, lidocaine (viscous), magnesium hydroxide, naloxone **OR** naloxone **OR** naloxone **OR** naloxone, ondansetron **OR** ondansetron, oxyCODONE, oxymetazoline, prochlorperazine **OR** prochlorperazine, QUEtiapine, senna-docusate, sodium chloride (PF), sodium chloride (PF), Warfarin Therapy Reminder           "Physical Exam:   Vitals were reviewed  Blood pressure 117/84, pulse 89, temperature 98.2  F (36.8  C), temperature source Oral, resp. rate 10, height 1.626 m (5' 4.02\"), weight 66.1 kg (145 lb 11.6 oz), SpO2 95 %.  Vitals:    11/26/21 1100 11/27/21 0400 11/28/21 0628   Weight: 64.8 kg (142 lb 14.4 oz) 66 kg (145 lb 8.1 oz) 66.1 kg (145 lb 11.6 oz)      I/O last 3 completed shifts:  In: 2225 [P.O.:340; I.V.:300; NG/GT:740]  Out: 2130 [Urine:2100; Chest Tube:30]    Gen: A&Ox4, NAD. Wife is at the bedside.   Pulm: Essentially clear, no SOB. Has trach, tolerating trach dome, speaking well with trach. Non-labored breathing.  CV: Normal S1 and S2.  RRR.  No murmur, gallop, or rub.  No peripheral edema.   Abd: NABS, soft, nontender, nondistended. G-tube to drainage.   Clamshell incision healed.   Tubes/drains: Dressing clean and dry, 160cc reported out in 24 hours (? Accuracy) output serosanguinous output, no air leak.          Data:    All labs and imaging reviewed by me.  Labs:    Data   BMP  Recent Labs   Lab 11/29/21  0532 11/29/21  0504 11/29/21  0242 11/28/21  2101 11/28/21  1715 11/28/21  1538 11/28/21  1207 11/28/21  0604 11/27/21  2046 11/27/21  1625   NA  --  137  137  --   --   --  139  --  140  140  --  140   POTASSIUM  --  4.3  4.3  --   --   --  4.3  --  4.1  4.1  --  4.3   CHLORIDE  --  101  101  --   --   --  102  --  104  104  --  103   ERIK  --  9.1  9.1  --   --   --  8.8  --  9.1  9.1  --  9.0   CO2  --  32  32  --   --   --  31  --  32  32  --  30   BUN  --  60*  60*  --   --   --  57*  --  60*  60*  --  64*   CR  --  0.93  0.93  --   --   --  0.78  --  0.78  0.78  --  0.80   * 137*  137* 139* 141*   < > 164*   < > 169*  169*   < > 152*    < > = values in this interval not displayed.     CBC  Recent Labs   Lab 11/29/21  0504 11/28/21  0604 11/27/21  0732 11/26/21  0603   WBC 12.2* 11.7* 14.3* 14.5*   RBC 3.00* 3.00* 3.26* 3.36*   HGB 9.2* 9.3* 9.9* 10.3*   HCT 29.5* 29.1* 32.5* " 33.7*   MCV 98 97 100 100   MCH 30.7 31.0 30.4 30.7   MCHC 31.2* 32.0 30.5* 30.6*   RDW 15.7* 15.9* 16.0* 16.0*   PLT 76* 71* 78* 72*     INR  Recent Labs   Lab 11/29/21  0504 11/28/21  0604 11/27/21  0732 11/26/21  1708   INR 1.31* 1.43* 1.51* 1.75*      Hepatic Panel   Recent Labs   Lab 11/29/21  0504 11/28/21  0604 11/27/21  0732 11/26/21  0603   AST 26 25 22 26   ALT 25 25 27 30   ALKPHOS 108 114 133 141   BILITOTAL 0.2 0.2 0.2 0.2   ALBUMIN 2.2* 2.1* 2.3* 2.5*     Glucose  Recent Labs   Lab 11/29/21  0532 11/29/21  0504 11/29/21  0242 11/28/21  2101 11/28/21 2027 11/28/21  1715   * 137*  137* 139* 141* 143* 131*       Imaging:  No results found for this or any previous visit (from the past 24 hour(s)).

## 2021-11-29 NOTE — PROGRESS NOTES
Pulmonary Medicine  Cystic Fibrosis - Lung Transplant Team  Progress Note  2021       Patient: Edson Thornton  MRN: [id number]  : 1965 (age 56 year old)  Transplant: 10/16/2021 (Lung), POD#44)  Admission date: 2021    Assessment & Plan:     Edson Thornton is a 56 year old male with a PMH significant for NSIP/ILD, bronchiectasis, moderate PH, RA, SALTY, chronic HSV infection, hypogammaglobulinemia, steroid-induced diabetes, hypothyroidism, PFO, HTN, HLD, duodenal anomaly, anxiety, and depression.  Admitted on 21 from OSH for acute on chronic respiratory failure 2/2 ILD exacerbation, now s/p BSLT on 10/16/21.  Prolonged vent wean s/p trach and PEG/J tube placement with thoracic surgery 10/29.  Post-op course otherwise complicated by encephalopathy and diffuse weakness, acute to subacute CVA, afib with RVR, BRENNAN, GI bleed, Candidemia/Candida empyema, and anxiety.  Code called  for bradycardia/asystole, progressive hypotensive, found to have significant GI bleed, EGD with adherent clot near PEG tube site that was clipped.  Tolerating TD trials since  and ongoing improvement in mentation and weakness.      Today's recommendations:  - TD with Cuff down and PMSV during the day.  - Capping trial today.   - Diuresis per Primary team.  - Tacrolimus repeat level today slightly subtherapeutic.  Increase to 2.5/2.0, recheck on  (ordered).   - Prednisone taper  not ordered.  - Coccidioides Ag on - repeat.  - Continue fluconazole.  EKG weekly for QTc monitoring (, ordered).  - Repeat IgG (12/15, ordered).  - DSA monitoring q2 weeks, () pending.     S/p BSLT for ILD:  Acute hypoxic respiratory failure s/p trach:   Bilateral pleural effusions: Unfortunately had not received vaccination for flu, PNA, or COVID-19 PTA.  Explant pathology with NSIP, no malignancy.  PGD 2-3.  Weaned off paralytic 10/19 (for vent dyssynchrony) and Caroline 10/22.  Initial difficulty weaning  sedation given agitation then with neurological findings as below.  Prolonged vent wean s/p trach and PEG/J tube placement with thoracic surgery 10/29.  Code called 11/2 for bradycardia/asystole (required 1 round of CPR, no medications) then progressively hypotensive, GI bleed as below.  Chest CT 11/2 with increased bilateral pleural effusions (moderate left, small right), bibasilar atelectasis with area of hypoenhancing parenchyma in LLL (suspicious for infection), and numerous nondisplaced anterior rib fractures bilaterally.  S/p left chest tube placement in IR 11/3 for pleural effusion/empyema (as below), right deferred given very little effusion on US.  Problems with trach cuff leak 11/3 (requiring multiple exchanges), bronch 11/14 with trach in good position with cuff well sealed in trachea and small to moderate secretions mostly in lower lobes. Has tolerated continuous TD 40% since 11/20.   - Nebs: levalbuterol and Mucomyst QID  - Pulmonary toilet with chest physiotherapy QID  - TD as tolerated otherwise bipap for support  - Diuresis per ICU team  - CT chest (11/22) New cavitary lesion in the left lung base, and with multifocal bilateral upper lobe GGO (some of which are new), new 1.8 cm fluid collection with surrounding fat stranding in the left axilla, decreased bilateral loculated pleural effusions.  - BAL on 11/22/21, growing Klebsiella pneumonia.  - Rt diagnostic thoracentesis (11/27/21): Results pending.  - CXR daily while chest tube in place, will review today's when completed.  11/28/2021: CXR showing left trapped lung. Started lytics on the left side on 11/25/21 in PM. It is now having bloody fluid.     Immunosuppression: s/p induction therapy with basiliximab 10/16 (and high dose IV steroid) and 10/20  - Tacrolimus decreased to 2 mg BID (suspension, via G tube) on 11/26/2021.  Goal level 8-12.   - MMF 1000 mg BID (11/2, decreased given GI bleed, AZA to be avoided given TPMT)  - Prednisolone at 12.5  mg qAM, 10 mg qPM, next taper due 12/5 (not ordered)  Date AM dose (mg) PM dose (mg)   11/21/21 12.5 10   12/5/21 10 10   1/2/22 10 7.5   1/30/22 7.5 7.5   2/27/22 7.5 5   3/27/22 5 5   4/24/22 5 2.5      Prophylaxis:   - Bactrim for PJP ppx (held 11/2-11/5 d/t BRENNAN)  - Nystatin for oral candidiasis ppx, 6 month course  - See below for serologies and viral ppx:    Donor Recipient Intervention   CMV status Negative Negative None, CMV monthly (due 12/14, neg 11/16)   EBV status Positive Positive None, EBV monthly (due 12/14, neg 11/16)   HSV status N/A Positive S/p acyclovir POD #1-30 (recent infection history pre-txp)      ID: Concern for possible Strongyloides exposure pre-transplant s/p ivermectin x1 dose (9/17).  Donor and recipient cultures NGTD.  S/p IV ceftazidime/vancomycin for 48h per protocol and additional empiric ceftazidime 10/19-10/23 given recurrent fevers as below.  Cryptococcal Ag negative 10/23.   - Monthly blood Coccidioides Ag x12 months post-transplant per ID (urine and serum negative 10/17), 11/17 urine and serum neg  - Serum Histoplasma and Blastomyces (11/22) negative.     Recurring fevers, Resolved:    Fevers post-op, Tmax 101.7 POD #1.  Febrile with worsening leukocytosis again 10/25, generally persisting.  LP (10/29), xanthochromic with pleocytosis thought to be appropriate given RBC and WBCs, no ABX recommended per transplant ID and neurology.  S/p empiric meropenem 10/28-11/2.  Now afebrile, ABX as above.     Klebsiella pneumoniae:  Pseudomonas fluorescens/putida: Klebsiella initially noted trach sputum culture 11/10.  Started on ceftriaxone 11/11, transitioned to ertapenem 11/12-11/13, and back to ceftriaxone 11/14.  Bronch culture 11/12 with Klebsiella and Pseudomonas fluorescens/putida (R-meropenem).     - ABX: Zosyn (11/15 - 11/23/2021) Started on IV ceftaz/leva (11/23 - now).      Disseminated Candida: Noted on blood cultures 10/20 and 10/22.  BDG fungitell positive (399) on  10/20.  Respiratory cultures with persistent Candida albicans.  TC 10/23 without evidence of endocarditis.  Ophthalmology consult 10/24 with benign dilated fundoscopic exam.  Candida empyema also noted 10/25, chest tubes inadvertently removed by CVTS 10/28, left chest tube replaced by IR 11/3 as above with ongoing Candida on cultures.   - (11/22) BDG fungitell positive galactomannan Ag neg  - Pleural fungal cultures (11/18) Candida albicans.   - BCx (11/18) Negative.  - Fluconazole (10/26- per transplant ID, prior micafungin 10/22-10/27), repeat EKG for QTc monitoring 11/23 (ordered), LFTs as below (see transplant ID note 11/5).  - Repeat thoracentesis on 11/27 (L96%, M3%) cultures NGTD.       HSV: Chronic intermittent active infection pre-transplant with recent HSV infection: crusted lesions throughout left side of jaw, s/p 10 day treatment course of ACV through 10/9.  HSV PCR blood negative 10/17.  S/p ACV ppx as above (started POD #1 instead of POD #8 given HSV history and location).     Hypogammaglobulinemia: IgG previously low at 364 (9/7).  Noted at 265 at time of transplant, s/p IVIG 10/21, repeat IgG (11/17) 198, s/p IVIG (with premedication) 11/18.  - Repeat IgG (12/15, ordered)     Positive cross match: Note that he received two doses of rituximab in June, which is likely contributing to cross match result.  DSA most recently negative 11/15.  - DSA monitoring q2 weeks, (11/29) pending     SALTY: Noted pre-transplant.  Home CPAP 6-12 cm H2O.  - Evaluate need for BiPAP after decannulation.     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  MRI 10/23 with multifocal subacute infarcts within both cerebral hemispheres and left cerebellum.  DDx  include surgery v embolic v infectious.  Heparin drip started 10/23 (intermittently held with GI bleed as below).  Repeat stroke code 10/25 d/t marked decrease in responsiveness with sedation wean, pupil inequality, and absent gag reflex.  CTA head without obvious new pathology, MRI brain (with and without contrast) primarily revealing for infarct, low likelihood of PRES.  Ammonia normal.  VEEG per neuro with severe diffuse encephalopathy.  Gradual improvement noted since 10/29, repeat head CT 11/2 (following code) without new acute intracranial abnormalities.  - AC management per primary team as below  - PT/OT     Afib with RVR: Noted 10/18, started on amiodarone drip and converted to SR, transitioned to PO 10/21, decreased 10/29. Metoprolol and amiodarone discontinued 11/20 d/t intermittent bradycardia. AC per primary team.     Right subclavian DVT: Seen on UE US from 11/4.  Heparin drip resumed 11/5, transitioned to warfarin 11/20 and in the setting of thrombocytopenia. HIT negative (11/21).      Recurrent GI bleed, Resolved: Hemoglobin dropped to 6.6 10/22, s/p 2 units pRBC.  OG tube with bloody output, EGD 10/23 noted NJ/OG tube trauma with scant oozing.  Progressive hypotension 11/2, hemoglobin 5.8 with bloody G tube output.  Heparin drip held, transitioned to PPI drip, and MTP activated.  EGD 11/2 with large amount of clotted blood in stomach and area of raised mucosa with small adherent clot near PEG tube site that was clipped, no active bleeding.  Maroon stools 11/3, repeat EGD with extensive old blood in stomach, no active bleeding, small nodular area with prior clips clipped again.  Most recent EGD 11/5 with ulcer noted at PEG tube bumper site, gastritis, and suction marks from G tube. TF noted in G tube drainage bag 11/7, AXR with GJ tube tip projecting over proximal duodenum. GJ tube exchanged 11/9 by GI.      Elevated LFTs: Shock liver post-op, now resolved.  Intermittent alk phos  "elevation.     Hypomagnesemia: Suppressed reabsorption 2/2 CNI.  Requiring nearly daily IV replacement.  - IV and PO magnesium supplementation      We appreciate the excellent care provided by [place] [id number] team.  Recommendations communicated via in person rounding and this note.  Will continue to follow along closely, please do not hesitate to call with any questions or concerns.      Jody Sifuentes MD [facility]     Subjective & Interval History:     No acute events overnight.  Still on Trach dome 21%.  Reportedly became tachycardic w/ PMSV and was taken off.  Feels somewhat dejected and feels as though he took a step back.     Review of Systems:     Please see HPI, otherwise the complete 10 point ROS is negative.     Physical Exam:     Vital signs:  Temp: 98.2  F (36.8  C) Temp src: Oral BP: 117/84 Pulse: 89   Resp: 10 SpO2: 95 % O2 Device: Trach dome Oxygen Delivery: 25 LPM Height: 162.6 cm (5' 4.02\") Weight: 66.1 kg (145 lb 11.6 oz)  I/O:   Intake/Output Summary (Last 24 hours) at 11/29/2021 0925  Last data filed at 11/29/2021 0847  Gross per 24 hour   Intake 1510 ml   Output 1875 ml   Net -365 ml       GENERAL: alert, NAD  HEENT: NCAT, EOMI, MMM  Neck: Bivona #6 in place  Lungs: good air flow, no crackles, rhonchi or wheezing  CV: RRR, S1S2, no murmurs noted  Abdomen: normoactive BS, soft, non tender  Neuro: AAO X 3  Psychiatric: normal affect, good eye contact  Skin: no rash, jaundice or lesions on limited exam  Extremities: No clubbing, cyanosis or edema.     Lines, Drains, and Devices:  PICC Triple Lumen 11/04/21 Left Basilic Access. PICC okay to use. (Active)   Site Assessment WDL 11/28/21 1900   External Cath Length (cm) 1 cm 11/28/21 1038   Extremity Circumference (cm) 28 cm 11/28/21 1038   Dressing Intervention Chlorhexidine patch;Transparent 11/29/21 0400   Dressing Change Due 12/05/21 11/28/21 1900   PICC Comment CDI/Statlock 11/28/21 1038   [place] - Status saline locked 11/29/21 0400   Gray - Cap Change Due 11/30/21 " 11/28/21 0800   Red - Status saline locked 11/29/21 0400   Red - Cap Change Due 11/30/21 11/28/21 0800   White - Status saline locked 11/29/21 0400   White - Cap Change Due 11/30/21 11/28/21 0800   Extravasation? No 11/17/21 2000   Line Necessity Yes, meets criteria 11/28/21 0800   Number of days: 25     Data:     LABS    CMP:   Recent Labs   Lab 11/29/21  0914 11/29/21  0532 11/29/21  0504 11/29/21  0242 11/28/21  1715 11/28/21  1538 11/28/21  1207 11/28/21  0604 11/27/21  2046 11/27/21  1625 11/27/21  0753 11/27/21  0732 11/26/21  0841 11/26/21  0603   NA  --   --  137  137  --   --  139  --  140  140  --  140  --  139  139   < > 144  144   POTASSIUM  --   --  4.3  4.3  --   --  4.3  --  4.1  4.1  --  4.3  --  4.2  4.2   < > 4.2  4.2   CHLORIDE  --   --  101  101  --   --  102  --  104  104  --  103  --  106  106   < > 108  108   CO2  --   --  32  32  --   --  31  --  32  32  --  30  --  29  29   < > 28  28   ANIONGAP  --   --  4  4  --   --  6  --  4  4  --  7  --  4  4   < > 8  8   * 137* 137*  137* 139*   < > 164*   < > 169*  169*   < > 152*   < > 159*  159*   < > 165*  165*   BUN  --   --  60*  60*  --   --  57*  --  60*  60*  --  64*  --  65*  65*   < > 71*  71*   CR  --   --  0.93  0.93  --   --  0.78  --  0.78  0.78  --  0.80  --  0.72  0.72   < > 0.82  0.82   GFRESTIMATED  --   --  >90  >90  --   --  >90  --  >90  >90  --  >90  --  >90  >90   < > >90  >90   ERIK  --   --  9.1  9.1  --   --  8.8  --  9.1  9.1  --  9.0  --  9.0  9.0   < > 9.4  9.4   MAG  --   --  1.7  --   --   --   --  1.6  --   --   --  1.5*  --  1.8   PHOS  --   --  3.7  --   --   --   --  3.1  --   --   --  2.9  --  2.7   PROTTOTAL  --   --  5.7*  --   --   --   --  5.8*  --   --   --  5.9*  --  6.4*   ALBUMIN  --   --  2.2*  --   --   --   --  2.1*  --   --   --  2.3*  --  2.5*   BILITOTAL  --   --  0.2  --   --   --   --  0.2  --   --   --  0.2  --  0.2   ALKPHOS  --   --  108  --   --   --    --  114  --   --   --  133  --  141   AST  --   --  26  --   --   --   --  25  --   --   --  22  --  26   ALT  --   --  25  --   --   --   --  25  --   --   --  27  --  30    < > = values in this interval not displayed.     CBC:   Recent Labs   Lab 11/29/21  0504 11/28/21  0604 11/27/21  0732 11/26/21  0603   WBC 12.2* 11.7* 14.3* 14.5*   RBC 3.00* 3.00* 3.26* 3.36*   HGB 9.2* 9.3* 9.9* 10.3*   HCT 29.5* 29.1* 32.5* 33.7*   MCV 98 97 100 100   MCH 30.7 31.0 30.4 30.7   MCHC 31.2* 32.0 30.5* 30.6*   RDW 15.7* 15.9* 16.0* 16.0*   PLT 76* 71* 78* 72*       INR:   Recent Labs   Lab 11/29/21  0504 11/28/21  0604 11/27/21  0732 11/26/21  1708   INR 1.31* 1.43* 1.51* 1.75*       Glucose:   Recent Labs   Lab 11/29/21  0914 11/29/21  0532 11/29/21  0504 11/29/21  0242 11/28/21  2101 11/28/21  2027   * 137* 137*  137* 139* 141* 143*       Blood Gas: No lab results found in last 7 days.    Culture Data No results for input(s): CULT in the last 168 hours.    Virology Data:   Lab Results   Component Value Date    FLUAH1 Negative 11/22/2021    FLUAH3 Negative 11/22/2021    ES4497 Negative 11/22/2021    IFLUB Negative 11/22/2021    RSVA Negative 11/22/2021    RSVB Negative 11/22/2021    PIV1 Negative 11/22/2021    PIV2 Negative 11/22/2021    PIV3 Negative 11/22/2021    HMPV Negative 11/22/2021    HRVS Negative 11/22/2021    ADVBE Negative 11/22/2021    ADVC Negative 11/22/2021    ADVC Negative 11/12/2021    ADVC Negative 10/17/2021       Historical CMV results (last 3 of prior testing):  Lab Results   Component Value Date    CMVQNT Not Detected 11/22/2021    CMVQNT Not Detected 11/16/2021    CMVQNT Not Detected 11/12/2021     No results found for: CMVLOG    Urine Studies    Recent Labs   Lab Test 11/01/21  1336 10/25/21  1507   URINEPH 5.5 5.5   NITRITE Negative Negative   LEUKEST Negative Negative   WBCU 0 2       Most Recent [facility] Pulmonary Function Testing (FVC/FEV1 only)  No results found for: 20002  No results  found for: 20003  No results found for: 20015  No results found for: 20016    IMAGING    Recent Results (from the past 48 hour(s))   POC US Guide for Thorcentesis    Impression    Limited chest ultrasound was performed and demonstrated an adequate fluid collection on the right chest. Doppler of the skin demonstrated intercostal vessels below the upper rib as anticipated, no vessels in needle path. A thoracentesis at this site was subsequently performed. (Images/clips did not save correctly with needle placement actually in the pleural space).    Yanick Whalen MD     XR Chest Port 1 View    Narrative    Exam:  Chest X-ray 11/27/2021 3:57 PM    History: Evaluate for right pneumothorax post thoracentesis    Comparison: 11/25/2021    Findings:   Single portable AP view of the chest. Tracheostomy tube is in the mid  thoracic trachea. Left basilar chest tube. Left PICC terminates in the  SVC. Unchanged mild left and trace right pleural effusions. No  discernible pneumothorax. Increased left basilar opacity.      Impression    Impression:   1.  No evidence of pneumothorax.Unchanged mild left and trace right  pleural effusions.  2.  Increased left basilar opacity likely represents atelectasis.    FITO PERALES MD         SYSTEM ID:  [id number]

## 2021-11-29 NOTE — PROGRESS NOTES
"RN paged primary team at 1330 \"Pt reports 8/10 pain, PRN oxy given to little effect. Can we get more pain meds on board? PRN Tylenol cannot be given until 1700. Thanks!\"     Primary team adjusted PRN oxy to 5-10mg and added PRN IV dilaudid to pts pain regimen at 1336.     Francheska Brady RN    "

## 2021-11-29 NOTE — PROGRESS NOTES
"CLINICAL NUTRITION SERVICES - REASSESSMENT NOTE     Nutrition Prescription    RECOMMENDATIONS FOR MDs/PROVIDERS TO ORDER:  Bowel regimen per team.    Malnutrition Status:    Severe malnutrition in the context of acute on chronic illness    Recommendations already ordered by Registered Dietitian (RD):  - Cycled TF: Pivot 1.5 @ 80mL/hr x 16 hrs (1280mL/day) via Jtube will provide: 1920 kcals (30 kcal/kg), 120g PRO (1.9 g/kg), 220 g CHO, 945mL free H20, and 9.6 g fiber daily.   - Added 2 pkts Nutrisource fiber each day will provide an additional 30kcal, 6g of fiber per day.   - Ordered 3-day calorie count.    Future/Additional Recommendations:  Continue to monitor wt, labs, stools, intake, TF tolerance.     EVALUATION OF THE PROGRESS TOWARD GOALS   Diet: Regular  Nutrition Support: Pivot 1.5 Conner @ 65ml/hr (1560ml/day) via Jtube will provide: 2340 kcals (37 kcal/kg), 146 g protein (2.3 g/kg), 1170 ml free H20, 268 g CHO, and 11 g fiber daily.   Intake: 6* Day Average TF was 1420mL/day, which provides 2130 kcal (32kcal/kg) and 133g PRO (2g/kg) per day. This meets 100% of estimated calorie and protein needs.   *pt formula switched from Vital 1.5 to Pivot 1.5 on 11/22.    - pt has had minimal PO intakes since diet advancement 11/26 (fluctuates between 0-50%) per flow sheet.   - at pt visit today, pt states that he has been tolerating his new TF regimen well without noticeable issues. He tells me that he has not been eating much and that he is \"just not hungry.\" He denies feelings of over fullness.     NEW FINDINGS   SLP Following: Recommend pt initiate regular textures and thin liquids. Pt should sit fully upright for all PO, wear PMSV for PO intake, slow pacing, alternate between consistencies, and take small sips/bites, provide 6 smaller meals throughout day.    Weight: 66.2 kg (wt has been fluctuating, but pt is still down 10% of admit wt)    Labs:   BUN 60 (H)  Recent Labs   Lab 11/29/21  0914 11/29/21  0532 " 11/29/21  0504 11/29/21  0242 11/28/21  2101 11/28/21 2027   * 137* 137*  137* 139* 141* 143*      Meds:  Caltrate  Folgard  Lasix  High sliding scale insulin   Lantus pen  Mag-Ox  Liquid MVI+M  Protonix  PRN: Dulcolax, Zofran, Compazine    GI: BM x5 11/28. BM x1 so far today. Loose/watery, incontinent stools per RN notes    Skin: 1+ (trace) edema    MALNUTRITION  % Intake: Decreased intake does not meet criteria - pt is on TF  % Weight Loss: > 7.5% in 3 months (severe)  Subcutaneous Fat Loss: Facial region: Mild, Upper arm: Moderate, Lower arm: Mild and Thoracic/intercostal: Mild  Muscle Loss: Temporal: Mild, Scapular bone: Moderate, Thoracic region (clavicle, acromium bone, deltoid, trapezius, pectoral): Mild, Upper arm (bicep, tricep): Moderate, Lower arm  (forearm): Mild, Dorsal hand: Moderate, Upper leg (quadricep, hamstring): Mild, Patellar region: Mild and Posterior calf: Severe  Fluid Accumulation/Edema: Does not meet criteria - 1+ (trace)  Malnutrition Diagnosis: Severe malnutrition in the context of acute on chronic illness    Previous Goals   Total avg nutritional intake to meet a minimum of  30 kcal/kg and 1.3 g PRO/kg daily (per dosing wt 64 kg).  Evaluation: Met    Previous Nutrition Diagnosis  Malnutrition related to hx of inadequate nutrition intake as evidenced by wt loss, fat loss, muscle loss.     Evaluation: No change, modified below    CURRENT NUTRITION DIAGNOSIS  Malnutrition related to hx of previous inadequate nutrition intake as evidenced by wt loss, fat loss, muscle loss.       INTERVENTIONS  Implementation  -Nutrition Education: Introduced self and role. Educated pt about potential of cycling TF, informed pt of calorie count and its purpose. Encouraged PO intake.   -Collaboration with other providers: Paged team about cycling TF.  -Enteral Nutrition: Modify composition, concentration, rate, schedule and volume. Switch to cycle TF, see above.   -Ordered 3-day calorie  count.    Goals  Total avg nutritional intake to meet a minimum of  30 kcal/kg and 1.3 g PRO/kg daily (per dosing wt 64 kg).    Monitoring/Evaluation   Progress toward goals will be monitored and evaluated per protocol.    Vaishnavi Dorantes  Dietetic Intern    I have read & agree with the above nutrition assessment, intervention and evaluation.     Kenna Torres, RD, LD   6D RD pager [phone]

## 2021-11-30 ENCOUNTER — APPOINTMENT (OUTPATIENT)
Dept: GENERAL RADIOLOGY | Facility: CLINIC | Age: 56
End: 2021-11-30
Attending: NURSE PRACTITIONER
Payer: COMMERCIAL

## 2021-11-30 ENCOUNTER — APPOINTMENT (OUTPATIENT)
Dept: OCCUPATIONAL THERAPY | Facility: CLINIC | Age: 56
End: 2021-11-30
Attending: STUDENT IN AN ORGANIZED HEALTH CARE EDUCATION/TRAINING PROGRAM
Payer: COMMERCIAL

## 2021-11-30 ENCOUNTER — APPOINTMENT (OUTPATIENT)
Dept: PHYSICAL THERAPY | Facility: CLINIC | Age: 56
End: 2021-11-30
Attending: STUDENT IN AN ORGANIZED HEALTH CARE EDUCATION/TRAINING PROGRAM
Payer: COMMERCIAL

## 2021-11-30 ENCOUNTER — APPOINTMENT (OUTPATIENT)
Dept: SPEECH THERAPY | Facility: CLINIC | Age: 56
End: 2021-11-30
Attending: STUDENT IN AN ORGANIZED HEALTH CARE EDUCATION/TRAINING PROGRAM
Payer: COMMERCIAL

## 2021-11-30 LAB
ALBUMIN SERPL-MCNC: 2.2 G/DL (ref 3.4–5)
ALP SERPL-CCNC: 106 U/L (ref 40–150)
ALT SERPL W P-5'-P-CCNC: 27 U/L (ref 0–70)
ANION GAP SERPL CALCULATED.3IONS-SCNC: 4 MMOL/L (ref 3–14)
ANION GAP SERPL CALCULATED.3IONS-SCNC: 6 MMOL/L (ref 3–14)
AST SERPL W P-5'-P-CCNC: 25 U/L (ref 0–45)
BILIRUB SERPL-MCNC: 0.1 MG/DL (ref 0.2–1.3)
BUN SERPL-MCNC: 45 MG/DL (ref 7–30)
BUN SERPL-MCNC: 52 MG/DL (ref 7–30)
CALCIUM SERPL-MCNC: 9 MG/DL (ref 8.5–10.1)
CALCIUM SERPL-MCNC: 9.7 MG/DL (ref 8.5–10.1)
CHLORIDE BLD-SCNC: 103 MMOL/L (ref 94–109)
CHLORIDE BLD-SCNC: 103 MMOL/L (ref 94–109)
CO2 SERPL-SCNC: 30 MMOL/L (ref 20–32)
CO2 SERPL-SCNC: 30 MMOL/L (ref 20–32)
CREAT SERPL-MCNC: 0.82 MG/DL (ref 0.66–1.25)
CREAT SERPL-MCNC: 0.84 MG/DL (ref 0.66–1.25)
ERYTHROCYTE [DISTWIDTH] IN BLOOD BY AUTOMATED COUNT: 15.6 % (ref 10–15)
GFR SERPL CREATININE-BSD FRML MDRD: >90 ML/MIN/1.73M2
GFR SERPL CREATININE-BSD FRML MDRD: >90 ML/MIN/1.73M2
GLUCOSE BLD-MCNC: 122 MG/DL (ref 70–99)
GLUCOSE BLD-MCNC: 133 MG/DL (ref 70–99)
GLUCOSE BLDC GLUCOMTR-MCNC: 108 MG/DL (ref 70–99)
GLUCOSE BLDC GLUCOMTR-MCNC: 117 MG/DL (ref 70–99)
GLUCOSE BLDC GLUCOMTR-MCNC: 124 MG/DL (ref 70–99)
GLUCOSE BLDC GLUCOMTR-MCNC: 132 MG/DL (ref 70–99)
GLUCOSE BLDC GLUCOMTR-MCNC: 143 MG/DL (ref 70–99)
GLUCOSE BLDC GLUCOMTR-MCNC: 218 MG/DL (ref 70–99)
HCT VFR BLD AUTO: 28.3 % (ref 40–53)
HGB BLD-MCNC: 8.9 G/DL (ref 13.3–17.7)
INR PPP: 1.49 (ref 0.85–1.15)
MAGNESIUM SERPL-MCNC: 1.7 MG/DL (ref 1.6–2.3)
MCH RBC QN AUTO: 30.5 PG (ref 26.5–33)
MCHC RBC AUTO-ENTMCNC: 31.4 G/DL (ref 31.5–36.5)
MCV RBC AUTO: 97 FL (ref 78–100)
PATH REPORT.COMMENTS IMP SPEC: NORMAL
PATH REPORT.FINAL DX SPEC: NORMAL
PATH REPORT.MICROSCOPIC SPEC OTHER STN: NORMAL
PATH REPORT.RELEVANT HX SPEC: NORMAL
PHOSPHATE SERPL-MCNC: 3.2 MG/DL (ref 2.5–4.5)
PLATELET # BLD AUTO: 81 10E3/UL (ref 150–450)
POTASSIUM BLD-SCNC: 4 MMOL/L (ref 3.4–5.3)
POTASSIUM BLD-SCNC: 4.5 MMOL/L (ref 3.4–5.3)
PROT SERPL-MCNC: 5.4 G/DL (ref 6.8–8.8)
RBC # BLD AUTO: 2.92 10E6/UL (ref 4.4–5.9)
SARS-COV-2 RNA RESP QL NAA+PROBE: NEGATIVE
SODIUM SERPL-SCNC: 137 MMOL/L (ref 133–144)
SODIUM SERPL-SCNC: 139 MMOL/L (ref 133–144)
WBC # BLD AUTO: 10.4 10E3/UL (ref 4–11)

## 2021-11-30 PROCEDURE — 250N000013 HC RX MED GY IP 250 OP 250 PS 637: Performed by: ANESTHESIOLOGY

## 2021-11-30 PROCEDURE — 97110 THERAPEUTIC EXERCISES: CPT | Mod: GO

## 2021-11-30 PROCEDURE — 85027 COMPLETE CBC AUTOMATED: CPT | Performed by: THORACIC SURGERY (CARDIOTHORACIC VASCULAR SURGERY)

## 2021-11-30 PROCEDURE — 94668 MNPJ CHEST WALL SBSQ: CPT

## 2021-11-30 PROCEDURE — 250N000013 HC RX MED GY IP 250 OP 250 PS 637: Performed by: STUDENT IN AN ORGANIZED HEALTH CARE EDUCATION/TRAINING PROGRAM

## 2021-11-30 PROCEDURE — 97530 THERAPEUTIC ACTIVITIES: CPT | Mod: GO

## 2021-11-30 PROCEDURE — 82247 BILIRUBIN TOTAL: CPT | Performed by: THORACIC SURGERY (CARDIOTHORACIC VASCULAR SURGERY)

## 2021-11-30 PROCEDURE — 97116 GAIT TRAINING THERAPY: CPT | Mod: GP

## 2021-11-30 PROCEDURE — 250N000013 HC RX MED GY IP 250 OP 250 PS 637: Performed by: THORACIC SURGERY (CARDIOTHORACIC VASCULAR SURGERY)

## 2021-11-30 PROCEDURE — 250N000011 HC RX IP 250 OP 636: Performed by: INTERNAL MEDICINE

## 2021-11-30 PROCEDURE — 36592 COLLECT BLOOD FROM PICC: CPT | Performed by: THORACIC SURGERY (CARDIOTHORACIC VASCULAR SURGERY)

## 2021-11-30 PROCEDURE — 250N000013 HC RX MED GY IP 250 OP 250 PS 637

## 2021-11-30 PROCEDURE — 71046 X-RAY EXAM CHEST 2 VIEWS: CPT | Mod: 26 | Performed by: RADIOLOGY

## 2021-11-30 PROCEDURE — 83735 ASSAY OF MAGNESIUM: CPT | Performed by: THORACIC SURGERY (CARDIOTHORACIC VASCULAR SURGERY)

## 2021-11-30 PROCEDURE — 99233 SBSQ HOSP IP/OBS HIGH 50: CPT | Performed by: STUDENT IN AN ORGANIZED HEALTH CARE EDUCATION/TRAINING PROGRAM

## 2021-11-30 PROCEDURE — U0005 INFEC AGEN DETEC AMPLI PROBE: HCPCS | Performed by: STUDENT IN AN ORGANIZED HEALTH CARE EDUCATION/TRAINING PROGRAM

## 2021-11-30 PROCEDURE — 250N000011 HC RX IP 250 OP 636: Performed by: PHYSICIAN ASSISTANT

## 2021-11-30 PROCEDURE — 84100 ASSAY OF PHOSPHORUS: CPT | Performed by: THORACIC SURGERY (CARDIOTHORACIC VASCULAR SURGERY)

## 2021-11-30 PROCEDURE — 999N000157 HC STATISTIC RCP TIME EA 10 MIN

## 2021-11-30 PROCEDURE — 94640 AIRWAY INHALATION TREATMENT: CPT | Mod: 76

## 2021-11-30 PROCEDURE — 94667 MNPJ CHEST WALL 1ST: CPT

## 2021-11-30 PROCEDURE — 250N000011 HC RX IP 250 OP 636: Performed by: ANESTHESIOLOGY

## 2021-11-30 PROCEDURE — 36592 COLLECT BLOOD FROM PICC: CPT

## 2021-11-30 PROCEDURE — 250N000013 HC RX MED GY IP 250 OP 250 PS 637: Performed by: INTERNAL MEDICINE

## 2021-11-30 PROCEDURE — 120N000003 HC R&B IMCU UMMC

## 2021-11-30 PROCEDURE — 250N000009 HC RX 250: Performed by: INTERNAL MEDICINE

## 2021-11-30 PROCEDURE — 82040 ASSAY OF SERUM ALBUMIN: CPT | Performed by: THORACIC SURGERY (CARDIOTHORACIC VASCULAR SURGERY)

## 2021-11-30 PROCEDURE — 250N000013 HC RX MED GY IP 250 OP 250 PS 637: Performed by: NURSE PRACTITIONER

## 2021-11-30 PROCEDURE — 99233 SBSQ HOSP IP/OBS HIGH 50: CPT | Performed by: INTERNAL MEDICINE

## 2021-11-30 PROCEDURE — 250N000012 HC RX MED GY IP 250 OP 636 PS 637: Performed by: INTERNAL MEDICINE

## 2021-11-30 PROCEDURE — 71045 X-RAY EXAM CHEST 1 VIEW: CPT | Mod: 26 | Performed by: RADIOLOGY

## 2021-11-30 PROCEDURE — 250N000011 HC RX IP 250 OP 636: Performed by: STUDENT IN AN ORGANIZED HEALTH CARE EDUCATION/TRAINING PROGRAM

## 2021-11-30 PROCEDURE — 93005 ELECTROCARDIOGRAM TRACING: CPT

## 2021-11-30 PROCEDURE — 250N000012 HC RX MED GY IP 250 OP 636 PS 637: Performed by: PHYSICIAN ASSISTANT

## 2021-11-30 PROCEDURE — 93010 ELECTROCARDIOGRAM REPORT: CPT | Performed by: INTERNAL MEDICINE

## 2021-11-30 PROCEDURE — 99233 SBSQ HOSP IP/OBS HIGH 50: CPT | Mod: 24 | Performed by: PHYSICIAN ASSISTANT

## 2021-11-30 PROCEDURE — 94640 AIRWAY INHALATION TREATMENT: CPT

## 2021-11-30 PROCEDURE — 85610 PROTHROMBIN TIME: CPT | Performed by: THORACIC SURGERY (CARDIOTHORACIC VASCULAR SURGERY)

## 2021-11-30 PROCEDURE — 71045 X-RAY EXAM CHEST 1 VIEW: CPT

## 2021-11-30 PROCEDURE — 97530 THERAPEUTIC ACTIVITIES: CPT | Mod: GP

## 2021-11-30 PROCEDURE — 71046 X-RAY EXAM CHEST 2 VIEWS: CPT

## 2021-11-30 RX ORDER — WARFARIN SODIUM 2 MG/1
2 TABLET ORAL
Status: COMPLETED | OUTPATIENT
Start: 2021-11-30 | End: 2021-11-30

## 2021-11-30 RX ADMIN — NYSTATIN 1000000 UNITS: 500000 SUSPENSION ORAL at 13:51

## 2021-11-30 RX ADMIN — QUETIAPINE FUMARATE 25 MG: 25 TABLET ORAL at 04:14

## 2021-11-30 RX ADMIN — HYDROXYZINE HYDROCHLORIDE 25 MG: 25 TABLET, FILM COATED ORAL at 09:57

## 2021-11-30 RX ADMIN — CEFTAZIDIME 2 G: 2 INJECTION, POWDER, FOR SOLUTION INTRAVENOUS at 13:51

## 2021-11-30 RX ADMIN — ACETYLCYSTEINE 2 ML: 200 SOLUTION ORAL; RESPIRATORY (INHALATION) at 18:27

## 2021-11-30 RX ADMIN — CEFTAZIDIME 2 G: 2 INJECTION, POWDER, FOR SOLUTION INTRAVENOUS at 20:05

## 2021-11-30 RX ADMIN — OXYCODONE HYDROCHLORIDE 10 MG: 5 TABLET ORAL at 04:14

## 2021-11-30 RX ADMIN — QUETIAPINE FUMARATE 50 MG: 50 TABLET ORAL at 20:05

## 2021-11-30 RX ADMIN — CALCIUM CARBONATE 600 MG (1,500 MG)-VITAMIN D3 400 UNIT TABLET 1 TABLET: at 10:01

## 2021-11-30 RX ADMIN — LEVOFLOXACIN 750 MG: 5 INJECTION, SOLUTION INTRAVENOUS at 16:57

## 2021-11-30 RX ADMIN — HYDROXYZINE HYDROCHLORIDE 50 MG: 50 TABLET, FILM COATED ORAL at 20:06

## 2021-11-30 RX ADMIN — ROSUVASTATIN CALCIUM 10 MG: 10 TABLET, FILM COATED ORAL at 09:57

## 2021-11-30 RX ADMIN — LEVALBUTEROL HYDROCHLORIDE 1.25 MG: 1.25 SOLUTION RESPIRATORY (INHALATION) at 09:08

## 2021-11-30 RX ADMIN — FUROSEMIDE 40 MG: 10 INJECTION INTRAMUSCULAR; INTRAVENOUS at 09:55

## 2021-11-30 RX ADMIN — MULTIVIT AND MINERALS-FERROUS GLUCONATE 9 MG IRON/15 ML ORAL LIQUID 15 ML: at 09:55

## 2021-11-30 RX ADMIN — HYDROMORPHONE HYDROCHLORIDE 0.3 MG: 1 INJECTION, SOLUTION INTRAMUSCULAR; INTRAVENOUS; SUBCUTANEOUS at 04:13

## 2021-11-30 RX ADMIN — PREDNISONE 10 MG: 5 TABLET ORAL at 20:05

## 2021-11-30 RX ADMIN — MYCOPHENOLATE MOFETIL 1000 MG: 200 POWDER, FOR SUSPENSION ORAL at 20:05

## 2021-11-30 RX ADMIN — NYSTATIN 1000000 UNITS: 500000 SUSPENSION ORAL at 16:57

## 2021-11-30 RX ADMIN — NYSTATIN 1000000 UNITS: 500000 SUSPENSION ORAL at 20:05

## 2021-11-30 RX ADMIN — Medication 400 MG: at 20:06

## 2021-11-30 RX ADMIN — Medication 1 PACKET: at 09:52

## 2021-11-30 RX ADMIN — Medication 1 TABLET: at 09:56

## 2021-11-30 RX ADMIN — ACETYLCYSTEINE 2 ML: 200 SOLUTION ORAL; RESPIRATORY (INHALATION) at 09:08

## 2021-11-30 RX ADMIN — MYCOPHENOLATE MOFETIL 1000 MG: 200 POWDER, FOR SUSPENSION ORAL at 09:53

## 2021-11-30 RX ADMIN — CALCIUM CARBONATE 600 MG (1,500 MG)-VITAMIN D3 400 UNIT TABLET 1 TABLET: at 18:20

## 2021-11-30 RX ADMIN — OXYCODONE HYDROCHLORIDE 10 MG: 5 TABLET ORAL at 20:06

## 2021-11-30 RX ADMIN — Medication 1 PACKET: at 20:09

## 2021-11-30 RX ADMIN — INSULIN GLARGINE 30 UNITS: 100 INJECTION, SOLUTION SUBCUTANEOUS at 10:56

## 2021-11-30 RX ADMIN — HYDROXYZINE HYDROCHLORIDE 50 MG: 50 TABLET, FILM COATED ORAL at 01:39

## 2021-11-30 RX ADMIN — LEVOTHYROXINE SODIUM 25 MCG: 0.03 TABLET ORAL at 09:58

## 2021-11-30 RX ADMIN — LEVALBUTEROL HYDROCHLORIDE 1.25 MG: 1.25 SOLUTION RESPIRATORY (INHALATION) at 18:26

## 2021-11-30 RX ADMIN — INSULIN ASPART 4 UNITS: 100 INJECTION, SOLUTION INTRAVENOUS; SUBCUTANEOUS at 20:14

## 2021-11-30 RX ADMIN — FUROSEMIDE 40 MG: 10 INJECTION INTRAMUSCULAR; INTRAVENOUS at 16:56

## 2021-11-30 RX ADMIN — TACROLIMUS 2.5 MG: 5 CAPSULE ORAL at 09:54

## 2021-11-30 RX ADMIN — HYDROMORPHONE HYDROCHLORIDE 0.3 MG: 1 INJECTION, SOLUTION INTRAMUSCULAR; INTRAVENOUS; SUBCUTANEOUS at 20:41

## 2021-11-30 RX ADMIN — SULFAMETHOXAZOLE AND TRIMETHOPRIM 1 TABLET: 400; 80 TABLET ORAL at 09:57

## 2021-11-30 RX ADMIN — WARFARIN SODIUM 2 MG: 2 TABLET ORAL at 18:20

## 2021-11-30 RX ADMIN — SODIUM CHLORIDE, PRESERVATIVE FREE 15 ML: 5 INJECTION INTRAVENOUS at 18:20

## 2021-11-30 RX ADMIN — Medication 40 MG: at 09:54

## 2021-11-30 RX ADMIN — QUETIAPINE FUMARATE 25 MG: 25 TABLET ORAL at 13:51

## 2021-11-30 RX ADMIN — Medication 400 MG: at 09:59

## 2021-11-30 RX ADMIN — Medication 10 MG: at 20:05

## 2021-11-30 RX ADMIN — INSULIN GLARGINE 30 UNITS: 100 INJECTION, SOLUTION SUBCUTANEOUS at 20:14

## 2021-11-30 RX ADMIN — Medication 40 MG: at 21:15

## 2021-11-30 RX ADMIN — FLUCONAZOLE IN SODIUM CHLORIDE 400 MG: 2 INJECTION, SOLUTION INTRAVENOUS at 10:56

## 2021-11-30 RX ADMIN — ACETAMINOPHEN 975 MG: 325 TABLET, FILM COATED ORAL at 04:13

## 2021-11-30 RX ADMIN — AMLODIPINE BESYLATE 5 MG: 2.5 TABLET ORAL at 09:55

## 2021-11-30 RX ADMIN — PREDNISONE 12.5 MG: 2.5 TABLET ORAL at 09:57

## 2021-11-30 RX ADMIN — CEFTAZIDIME 2 G: 2 INJECTION, POWDER, FOR SOLUTION INTRAVENOUS at 04:22

## 2021-11-30 RX ADMIN — ESCITALOPRAM 5 MG: 5 TABLET, FILM COATED ORAL at 09:56

## 2021-11-30 RX ADMIN — NYSTATIN 1000000 UNITS: 500000 SUSPENSION ORAL at 09:54

## 2021-11-30 RX ADMIN — TACROLIMUS 2 MG: 5 CAPSULE ORAL at 18:20

## 2021-11-30 RX ADMIN — INSULIN ASPART 1 UNITS: 100 INJECTION, SOLUTION INTRAVENOUS; SUBCUTANEOUS at 04:25

## 2021-11-30 RX ADMIN — QUETIAPINE FUMARATE 25 MG: 25 TABLET ORAL at 10:00

## 2021-11-30 RX ADMIN — ACETAMINOPHEN 975 MG: 325 TABLET, FILM COATED ORAL at 20:05

## 2021-11-30 RX ADMIN — QUETIAPINE FUMARATE 25 MG: 25 TABLET ORAL at 20:06

## 2021-11-30 RX ADMIN — LIDOCAINE 2 PATCH: 560 PATCH PERCUTANEOUS; TOPICAL; TRANSDERMAL at 09:59

## 2021-11-30 RX ADMIN — OXYCODONE HYDROCHLORIDE 10 MG: 5 TABLET ORAL at 10:01

## 2021-11-30 ASSESSMENT — ACTIVITIES OF DAILY LIVING (ADL)
ADLS_ACUITY_SCORE: 23
ADLS_ACUITY_SCORE: 22
ADLS_ACUITY_SCORE: 22
ADLS_ACUITY_SCORE: 23
ADLS_ACUITY_SCORE: 22
ADLS_ACUITY_SCORE: 23
ADLS_ACUITY_SCORE: 22
ADLS_ACUITY_SCORE: 23

## 2021-11-30 NOTE — PROGRESS NOTES
[facility] [place]    Transplant Infectious Diseases Inpatient Progress Note      Edson Thornton MRN# [id number]   YOB: 1965 Age: 56 year old   Date of Admission and time: 9/5/2021  4:42 PM             Recommendations:   1. Continue fluconzole until 12/21/2021 (4 weeks after first negative blood cx).   2. Continue ceftazdime until 12/21/2021.   3. Continue levaquin until 12/7/2021.   4. Please help to schedule a follow up CT chest on 12/21/2021 before his appointment with me.   5. Please include in the patient's discharge instructions, follow up with Dr. Abebe on 12/21/2021 @6:30 PM in a virtual visit.   6. No indicated labs outside of post-transplant labs.   7. No need for monthly urinary Coccidioides Ag.     ID will sign off, please call for questions.         Summary of Presentation:   Transplants:  10/16/2021 (Lung), Postoperative day:  45     This patient is a 56 year old male with ILD due to RA was on MMF, rituximab and high dose steroids prior to lung transplant. S/p Bi lung transplant. Basiliximab for induction, TAC/MMF/prednisone for maintenance.         Active Problems and Infectious Diseases Issues:   1. LLL cavity.   Aspiration- vs pseudmonal- vs K pneumonia-induced abscess.   I am no sure that this is new or was obscured by pleural effusion on prior CT.     Will treat with double coverage ABx for the first few weeks given that severe P aeruginosa is known to develop resistance.   Ceftazidime targets both K pneumonia (two strains with different susceptibilies) and P aeruginosa. Will also give levaquin for two weeks.  Will treat for total of 4 weeks with repeat CT in 4 weeks to follow up on the cavity.     This is less likely to be fungal infection including endemic fungi. Recent Cocci Ag in the urine was negative (resided in the past in endemic areas). Also Histoplasma rarely reactivates (donor and recipient from an endemic areas) and the patient is already on  fluconazole which is active against both Histoplasma and Cocci. Will check urinary Histo to be on the safe side (serum Histo was negative). CRAG was negative on 10/29/2021.     2. Invasive candidiasis with C albicans.   Left pleural empyema and candidemia.   The candidemia was positive 11/20/2021 and 11/22/2021, first negative blood cx on 11/23/2021.   The left empyema treated with chest tube.   The right loculated effusion was drained 11/27/2021 with exudate but no evidence of empyema.     Continue fluconazole for total of 4 weeks.     3. At risk for drug adverse event.   Given fluconazole/levaquin combination; we checked EKG without evidence of QTc prolongation.     4. Granuloma on CT chest prior to transplant   This was not seen in the pathology of the explanted lungs also no evidence of mycobacterial or fungal infection of the resected LN.   There would be no further indications to check monthly Cocci Ag in the urine.     5. Diarrhea.   Likely due to MMF and other drugs including ABx.         Old Problems and Infectious Diseases Issues:   1. Significant travel history throughout [place]; was given ivermectin empirically prior to transplant. Serology was negative but was on ritiximab/MMF/high dose steroids when checked.   2. Indeterminate QuantiFERON while on rituximab/MMF/high dose steroids; was in  and traveled to endemic areas but tubeculin skin test was always negative on multiple occasions prior to becoming immunocompromised. Was not deemed to have LTBI.     Other Infectious Disease issues include:  - QTc 394 as of 11/23/21 and 4/16 as of 11/30/2021.   - PCP prophylaxis: bactrim.  - Serostatus: CMV D-/R-, EBV D+/R+, HSV1+/2+, VZV +, Toxo -/-. Received ACV ppx until 11/15/2021.    - Immunization status: Too early to discuss. Did not receive the flu, the pneumonia vaccines or the COVID-19 vaccine  - Gamma globulin status: 198 as of 11/17/2021, received IVIG on 10/21/2021 and 11/18/2021        Attestation:  I interviewed the patient and obtained history from the patient, and by reviewing the patient's chart including outside records, microbiological data, and radiological data. All data are summarized in this notes.  Ming Abebe MD  [facility]  Contact information available via [facility] Paging/Directory    11/30/2021             Interim History:   No fever.   Now with capped tracheostomy.   No cough or shortness of breath.          History of Present Illness:   Transplants:  10/16/2021 (Lung), Postoperative day:  45     This patient is a 56 year old male with ILD due to RA was on MMF, rituximab and high dose steroids prior to lung transplant. S/p Bi lung transplant.   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   Also complicated by candidemia with C albicans on 10/202/2021 in the setting of respiratory samples following transplantation with C albicans. Ophthalmology exam was unremarkable.   micafungin was started on 10/22/2021 and switched to fluconazole on 10/26/2021.   The first negative blood cx were from 10/23/2021.   The patient also underwent left chest tube placement.     On repeat CT chest on 10/22/2021, he was found to have LLL cavity. TID were reconsulted.   The patient has no fever. No shortness of breath. The left chest tube in place but has not been draining.       Exposure History in a prior visit:  Was born in [place], lived in many states including [facility] and [facility]. Currently lives in [place] in a house with his girlfriend/fiancee and her son who's fully vaccinated. No pets. He used to work as a  all over the country.   He used to be in the [employer] and was stationed in [employer] and [employer]. He also lived in [place].   He was tested for TB by [facility] on many occasions and was negative.   No significant outdoors exposure.   Institutionalized for ETOH use in the past.           Review of Systems:       As mentioned in the HPI otherwise negative by reviewing constitutional symptoms, central and peripheral neurological systems, respiratory system, cardiac system, GI system,  system, musculoskeletal, skin, allergy, and lymphatics.                Allergies:   No Known Allergies          Medications:   Medications that Require Transfusion:     dextrose Stopped (10/24/21 1008)     Warfarin Therapy Reminder       Scheduled Medications:     acetylcysteine  2 mL Nebulization BID     amLODIPine  5 mg Per G Tube Daily     calcium carbonate 600 mg-vitamin D 400 units  1 tablet Oral or Feeding Tube BID w/meals     cefTAZidime  2 g Intravenous Q8H     escitalopram  5 mg Oral or Feeding Tube Daily     fiber modular (NUTRISOURCE FIBER)  1 packet Per Feeding Tube BID     fluconazole  400 mg Intravenous Q24H     folic acid-vit B6-vit B12  1 tablet Per G Tube Daily     furosemide  40 mg Intravenous BID     heparin lock flush  5-20 mL Intracatheter Q24H     insulin aspart  1-12 Units Subcutaneous Q4H     insulin glargine  30 Units Subcutaneous BID     levalbuterol  1.25 mg Nebulization BID     levofloxacin  750 mg Intravenous Q24H     levothyroxine  25 mcg Oral Daily     lidocaine  2 patch Transdermal Q24H     lidocaine   Transdermal Q8H     magnesium oxide  400 mg Oral BID     melatonin  10 mg Oral QPM     multivitamins w/minerals  15 mL Per Feeding Tube Daily     mycophenolate  1,000 mg Per J Tube BID     nystatin  1,000,000 Units Swish & Swallow 4x Daily     pantoprazole  40 mg Per Feeding Tube BID     predniSONE  10 mg Per J Tube QPM     predniSONE  12.5 mg Per J Tube Daily     QUEtiapine  25 mg Oral or Feeding Tube BID     QUEtiapine  50 mg Oral At Bedtime     rosuvastatin  10 mg Oral or Feeding Tube Daily     sodium chloride (PF)  10-40 mL Intracatheter Q8H     sodium chloride (PF)  3 mL Intracatheter Q8H     sulfamethoxazole-trimethoprim  1 tablet Oral or Feeding Tube Daily     tacrolimus  2 mg Oral QPM     tacrolimus   2.5 mg Oral QAM     warfarin ANTICOAGULANT  2 mg Oral ONCE at 18:00               Physical Exam:   Temp: 97.7  F (36.5  C) Temp src: Oral BP: 122/86 Pulse: 112   Resp: 25 SpO2: 95 % O2 Device: None (Room air) (Speaking on/ RA) Oxygen Delivery: 25 LPM    Wt Readings from Last 4 Encounters:   11/29/21 66.2 kg (145 lb 15.1 oz)     Constitutional: awake, alert, cooperative, no apparent distress and appears at stated age, well nourished.   Lungs: CTA bilaterally, no accessory muscle use.   CVS: RRR, normal S1/S2, no murmur, PMI was not displaced.   Abdomen: non-tender, non-distended, no masses, no bruit, no shifting dullness, normal BS.   Extremities: no pitting edema of bilateral lower extremities, no ulcers, normal ROM of all joints, no swelling or erythema of any of joints and no tenderness to palpation.   Skin: no induration, fluctuation or discharge at the clamshell surgical site, and no rash            Data:   No results found for: ACD4    Inflammatory Markers    Recent Labs   Lab Test 10/29/21  0542 10/14/21  0943 10/12/21  1038 10/10/21  1024 10/08/21  0947 10/06/21  1007 10/04/21  0432 10/02/21  0528   SED 87*  --   --   --   --   --   --   --    CRP 53.0* 23.0* 17.0* 30.0* 34.0* 35.0* 23.0* 28.0*       Immune Globulin Studies     Recent Labs   Lab Test 11/17/21  0337 10/15/21  0907 09/07/21  1324 09/07/21  1100   * 265* 363*  363*  --    IGM  --   --  51  --    IGE  --   --   --  83   IGA  --   --  103  --        Metabolic Studies       Recent Labs   Lab Test 11/30/21  1201 11/30/21  0748 11/30/21  0431 11/30/21  0424 11/30/21  0125 11/29/21  2010 11/29/21  1703 11/29/21  1535 11/29/21  0532 11/29/21  0504 11/28/21  1715 11/28/21  1538 11/28/21  1207 11/28/21  0604 11/27/21  2046 11/27/21  1625 11/15/21  0749 11/15/21  0344 10/22/21  1602 10/22/21  1601 10/22/21  0959 10/22/21  0958   NA  --   --  139  --   --   --   --  138  --  137  137  --  139  --  140  140  --  140   < > 140  140   < > 147*  --   147*   POTASSIUM  --   --  4.0  --   --   --   --  4.4  --  4.3  4.3  --  4.3  --  4.1  4.1  --  4.3   < > 4.6  4.6   < > 3.6  --  3.5   CHLORIDE  --   --  103  --   --   --   --  102  --  101  101  --  102  --  104  104  --  103   < > 106  106   < > 109  --  106   CO2  --   --  30  --   --   --   --  30  --  32  32  --  31  --  32  32  --  30   < > 31  31   < > 31  --  34*   ANIONGAP  --   --  6  --   --   --   --  6  --  4  4  --  6  --  4  4  --  7   < > 3  3   < > 7  --  7   BUN  --   --  52*  --   --   --   --  58*  --  60*  60*  --  57*  --  60*  60*  --  64*   < > 37*  37*   < > 68*  --  62*   CR  --   --  0.82  --   --   --   --  0.87  --  0.93  0.93  --  0.78  --  0.78  0.78  --  0.80   < > 0.47*  0.47*   < > 1.47*  --  1.54*   GFRESTIMATED  --   --  >90  --   --   --   --  >90  --  >90  >90  --  >90  --  >90  >90  --  >90   < > >90  >90   < > 53*  --  50*   * 124* 133* 143* 117* 231*   < > 206*   < > 137*  137*   < > 164*   < > 169*  169*   < > 152*   < > 191*  191*   < > 148*   < > 92   A1C  --   --   --   --   --   --   --   --   --   --   --   --   --   --   --   --   --  5.3  --   --   --   --    ERIK  --   --  9.0  --   --   --   --  9.0  --  9.1  9.1  --  8.8  --  9.1  9.1  --  9.0   < > 8.7  8.7   < > 8.2*  --  7.9*   PHOS  --   --  3.2  --   --   --   --   --   --  3.7  --   --   --  3.1  --   --    < > 3.6   < > 2.8  --   --    MAG  --   --  1.7  --   --   --   --   --   --  1.7  --   --   --  1.6  --   --    < > 1.5*   < > 2.1  --   --    LACT  --   --   --   --   --   --   --  1.4  --   --   --  1.2  --   --   --   --    < >  --    < >  --    < >  --    CKT  --   --   --   --   --   --   --   --   --   --   --   --   --   --   --   --   --   --   --  51  --  55    < > = values in this interval not displayed.       Hepatic Studies    Recent Labs   Lab Test 11/30/21  0431 11/29/21  0504 11/28/21  0604 11/27/21  0732 11/26/21  0603 11/25/21  0544 09/30/21  1059  09/19/21  1629   BILITOTAL 0.1* 0.2 0.2 0.2 0.2 0.2   < >  --    ALKPHOS 106 108 114 133 141 137   < >  --    ALBUMIN 2.2* 2.2* 2.1* 2.3* 2.5* 2.4*   < >  --    AST 25 26 25 22 26 27   < >  --    ALT 27 25 25 27 30 32   < >  --    LDH  --   --   --   --   --   --   --  423*    < > = values in this interval not displayed.       Pancreatitis testing    Recent Labs   Lab Test 10/22/21  0407 10/21/21  0354 10/20/21  0308 10/19/21  0338 09/07/21  1324 09/07/21  1100   AMYLASE  --   --   --   --   --  32   TRIG 307* 486* 383* 458* 364*  --        Hematology Studies      Recent Labs   Lab Test 11/30/21  0431 11/29/21  0504 11/28/21  0604 11/27/21  0732 11/26/21  0603 11/25/21  0544 11/02/21  1302 11/02/21  1053 11/02/21  0832 11/02/21  0815 11/02/21  0556 11/01/21  0818 10/25/21  0326 10/24/21  0410 10/23/21  0344   WBC 10.4 12.2* 11.7* 14.3* 14.5* 11.9*   < > 57.4*  57.4*   < > 37.5* 32.6* 28.4*   < > 21.4* 21.3*   ANEU  --   --   --   --   --   --   --  49.9*  --  28.5* 29.7* 25.3*  --  19.9* 20.0*   ALYM  --   --   --   --   --   --   --  4.0  --  4.9 1.3 1.1  --  0.4* 0.2*   DONALD  --   --   --   --   --   --   --  1.7*  --  2.3* 0.7 1.1  --  0.9 1.1   AEOS  --   --   --   --   --   --   --  0.0  --  0.0 0.0 0.0  --  0.0 0.0   HGB 8.9* 9.2* 9.3* 9.9* 10.3* 10.1*   < > 8.8*   < > 5.8* 8.2* 9.6*   < > 9.8* 9.6*   HCT 28.3* 29.5* 29.1* 32.5* 33.7* 33.2*   < > 26.8*   < > 19.6* 27.3* 31.4*   < > 30.5* 30.2*   PLT 81* 76* 71* 78* 72* 66*   < > 244   < > 382 355 308   < > 201 132*    < > = values in this interval not displayed.       Clotting Studies    Recent Labs   Lab Test 11/30/21  0431 11/29/21  0504 11/28/21  0604 11/27/21  0732 11/03/21  0407 11/02/21  1053 11/02/21  0927 11/02/21  0908 11/02/21  0832 10/23/21  0344 10/22/21  1407   INR 1.49* 1.31* 1.43* 1.51*   < > 1.55*  --    < > 1.38*   < > 1.28*   PTT  --   --   --   --   --  34 32  --  50*  --  43*    < > = values in this interval not displayed.       Arterial  Blood Gas Testing    Recent Labs   Lab Test 11/22/21  0426 11/22/21  0103 11/21/21  1029 11/20/21  0324 11/14/21  0534 11/11/21  1740 11/07/21  0626 11/06/21  0438 11/05/21  0504 11/05/21  0336 11/04/21  1016   PH  --   --   --   --   --  7.48*  --  7.43 7.49* 7.49* 7.46*   PCO2  --   --   --   --   --  37  --  37 31* 32* 35   PO2  --   --   --   --   --  106*  --  109* 126* 331* 95   HCO3  --   --   --   --   --  28  --  25 24 24 25   O2PER 40 40 40 40   < > 40   < > 40 40 40 40    < > = values in this interval not displayed.        Urine Studies     Recent Labs   Lab Test 11/01/21  1336 10/25/21  1507 10/22/21  1641 10/17/21  1642 10/17/21  1041   URINEPH 5.5 5.5 7.5* 5.0 5.0   NITRITE Negative Negative Negative Negative Negative   LEUKEST Negative Negative Negative Negative Trace*   WBCU 0 2 3 25* 44*       Vancomycin Levels     Recent Labs   Lab Test 10/18/21  0403   VANCOMYCIN 12.6       Tobramycin levels     No lab results found.    Gentamicin levels    No lab results found.    Tacrolimus levels    Invalid input(s): TACROLIMUS, TAC, TACR  Transplant Immunosuppression Labs Latest Ref Rng & Units 11/30/2021 11/29/2021 11/29/2021 11/29/2021 11/28/2021   Creat 0.66 - 1.25 mg/dL 0.82 0.87 0.93 0.93 0.78   BUN 7 - 30 mg/dL 52(H) 58(H) 60(H) 60(H) 57(H)   WBC 4.0 - 11.0 10e3/uL 10.4 - - 12.2(H) -   Neutrophil % - - - - -   ANEU 1.6 - 8.3 10e3/uL - - - - -       Cyclosporine levels    Invalid input(s): CYCLOSPORINE, CYC    Mycophenolate levels    Invalid input(s): MYPA, MYP    Sirolimus levels    Invalid input(s): SIROLIMUS, SIR, RAPA    CSF testing     Recent Labs   Lab Test 10/29/21  1221   CWBC 80*   CNEU 94   CLYM 3   CMONO 4   CRBC 9,000*   CGLU 83*   CTP 97*         Microbiology:  Blood cx reviewed in the HPI and the A/P   Last check of C difficile  C Difficile Toxin B by PCR   Date Value Ref Range Status   11/20/2021 Negative Negative Final     Comment:     A negative result does not exclude actual disease due  to C. difficile and may be due to improper collection, handling and storage of the specimen or the number of organisms in the specimen is below the detection limit of the assay.       Virology:  CMV viral loads    CMV DNA IU/mL   Date Value Ref Range Status   11/22/2021 Not Detected Not Detected IU/mL Final   11/16/2021 Not Detected Not Detected IU/mL Final     CMV viral loads    Recent Labs   Lab Test 11/22/21  1446 11/16/21  0357   CMVQNT Not Detected Not Detected       CMV viral loads    CMV DNA IU/mL   Date Value Ref Range Status   11/22/2021 Not Detected Not Detected IU/mL Final   11/16/2021 Not Detected Not Detected IU/mL Final   11/12/2021 Not Detected Not Detected IU/mL Final   10/26/2021 Not Detected Not Detected IU/mL Final       CMV resistance testing  No lab results found.  No results found for: CMVCID, CMVFOS, CMVGAN     No results found for: H6RES    EBV DNA Copies/mL   Date Value Ref Range Status   11/16/2021 Not Detected Not Detected copies/mL Final       CMV Antibody IgG   Date Value Ref Range Status   10/15/2021 No detectable antibody. No detectable antibody.  Final   09/07/2021 No detectable antibody. No detectable antibody.  Final       Toxoplasma Antibody IgG   Date Value Ref Range Status   09/07/2021 <3.0 0.0 - 7.1 IU/mL Final     Comment:     Negative- Absence of detectable Toxoplasma gondii IgG antibodies. A negative result does not rule out acute infection.         Pathology   Native explanted lungs 10/16/2021  A.  LUNG, LEFT NATIVE, PNEUMONECTOMY:  -Nonspecific interstitial pneumonitis (NSIP), predominant fibrotic pattern, with superimposed organizing pneumonia, reactive pneumocyte type II hyperplasia, intra-alveolar clusters of pigmented macrophages, and areas of end-stage fibrosis.  -Three benign hilar lymph nodes.  B.  LUNG, RIGHT NATIVE, PNEUMONECTOMY:  -Nonspecific interstitial pneumonitis (NSIP), predominant fibrotic pattern, with superimposed organizing pneumonia, reactive pneumocyte  type II hyperplasia, intra-alveolar clusters of pigmented macrophages, and areas of end-stage fibrosis.  -Four benign hilar lymph nodes.  C.  Lymph node, level 5, excision:  -Fragments of lymph node, negative for malignancy.      Imaging:  CXR 11/24/2021.   Impression:   1. Stable support devices  2. Creasing opacity at the right lung base superimposed on bibasilar  hazy opacity. This suggests increased atelectasis although  consolidation from pneumonia should be considered.    CT chest 11/22/21   IMPRESSION:   1. New cavitary lesion in the left lung base with multifocal  groundglass opacities some of which are new compared to CT 11/2/2021,  notably in the bilateral upper lobes concerning for ongoing infection.  2. Decreased bilateral loculated pleural effusions.  3. New 1.8 cm fluid collection with surrounding fat stranding in the  left axilla.  4. Similar small pericardial effusion.        Ming Abebe MD  [facility]  Contact information available via [facility] Paging/Directory     11/30/2021

## 2021-11-30 NOTE — PROGRESS NOTES
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 Corral. Prolonged vent wean s/p trach and PEG/J tube placement with thoracic surgery 10/29.  Post-op course otherwise complicated by encephalopathy and diffuse weakness, acute to subacute CVA, afib with RVR, BRENNAN, GI bleed, Candidemia/Candida empyema, and anxiety.  Code called 11/2 for bradycardia/asystole, progressive hypotensive, found to have significant GI bleed, EGD with adherent clot near PEG tube site that was clipped.  Tolerating TD trials since 11/20 and ongoing improvement in mentation and weakness.  Patient has continued to improve and transferred to the floor 11/23.  Medicine team primary, CVTS following for incision and chest tube management.     - Currently being treated with ceftazidime, levaquin and fluconazole per transplant ID.   - All surgical drains have been removed  - CT chest 11/22 showed new cavitary lesion in the left lung base, and with multifocal bilateral upper lobe GGO (some of which are new), new 1.8 cm fluid collection with surrounding fat stranding in the left axilla, decreased bilateral loculated pleural effusions.  - Left pigtail placed 11/3 for empyema/pleural effusion  - Keeping pigtail until at least completion of antifungal, also starting lytics per pulm 11/25 for 3 days  - CXR pending   - Plan to remove chest tube today, will discuss with pulmonary.   - Other cares per medicine and pulmonary team    D/w Dr. Shayna Colón, DNP, CNP  [facility] Cardiothoracic Surgery  O: [phone]  Pgr [phone]          Interval History:   No acute events overnight. Slept good. No agitation or delirium. States pain is well managed on current regimen. No acute complaints.          Medications:       acetylcysteine  2 mL  Nebulization BID     amLODIPine  5 mg Per G Tube Daily     calcium carbonate 600 mg-vitamin D 400 units  1 tablet Oral or Feeding Tube BID w/meals     cefTAZidime  2 g Intravenous Q8H     escitalopram  5 mg Oral or Feeding Tube Daily     fiber modular (NUTRISOURCE FIBER)  1 packet Per Feeding Tube BID     fluconazole  400 mg Intravenous Q24H     folic acid-vit B6-vit B12  1 tablet Per G Tube Daily     furosemide  40 mg Intravenous BID     heparin lock flush  5-20 mL Intracatheter Q24H     insulin aspart  1-12 Units Subcutaneous Q4H     insulin glargine  30 Units Subcutaneous BID     levalbuterol  1.25 mg Nebulization BID     levofloxacin  750 mg Intravenous Q24H     levothyroxine  25 mcg Oral Daily     lidocaine  2 patch Transdermal Q24H     lidocaine   Transdermal Q8H     magnesium oxide  400 mg Oral BID     melatonin  10 mg Oral QPM     multivitamins w/minerals  15 mL Per Feeding Tube Daily     mycophenolate  1,000 mg Per J Tube BID     nystatin  1,000,000 Units Swish & Swallow 4x Daily     pantoprazole  40 mg Per Feeding Tube BID     predniSONE  10 mg Per J Tube QPM     predniSONE  12.5 mg Per J Tube Daily     QUEtiapine  25 mg Oral or Feeding Tube BID     QUEtiapine  50 mg Oral At Bedtime     rosuvastatin  10 mg Oral or Feeding Tube Daily     sodium chloride (PF)  10-40 mL Intracatheter Q8H     sodium chloride (PF)  3 mL Intracatheter Q8H     sulfamethoxazole-trimethoprim  1 tablet Oral or Feeding Tube Daily     tacrolimus  2 mg Oral QPM     tacrolimus  2.5 mg Oral QAM     warfarin ANTICOAGULANT  2 mg Oral ONCE at 18:00     acetaminophen, albuterol, bisacodyl, dextrose, glucose **OR** dextrose **OR** glucagon, heparin lock flush, hydrALAZINE, HYDROmorphone, hydrOXYzine **OR** hydrOXYzine, labetalol, lidocaine (viscous), magnesium hydroxide, naloxone **OR** naloxone **OR** naloxone **OR** naloxone, ondansetron **OR** ondansetron, oxyCODONE, oxymetazoline, prochlorperazine **OR** prochlorperazine, QUEtiapine,  "senna-docusate, sodium chloride (PF), sodium chloride (PF), Warfarin Therapy Reminder          Physical Exam:   Vitals were reviewed  Blood pressure 122/86, pulse 112, temperature 97.7  F (36.5  C), temperature source Oral, resp. rate 25, height 1.626 m (5' 4.02\"), weight 66.2 kg (145 lb 15.1 oz), SpO2 95 %.  Vitals:    11/27/21 0400 11/28/21 0628 11/29/21 1053   Weight: 66 kg (145 lb 8.1 oz) 66.1 kg (145 lb 11.6 oz) 66.2 kg (145 lb 15.1 oz)      I/O last 3 completed shifts:  In: 1170 [NG/GT:440]  Out: 1490 [Urine:1400; Emesis/NG output:50; Chest Tube:40]    Gen: A&Ox4, NAD. Sitting up in the wheelchair.   Pulm: Has trach, tolerating trach dome, speaking well with trach. Non-labored breathing.  CV: RRR,  No peripheral edema.   Clamshell incision healed.   Tubes/drains: Chest tube with dressing clean and dry, 50 cc out in 24 hours serosanguinous output, no air leak.          Data:    All labs and imaging reviewed by me.  Labs:    Data   BMP  Recent Labs   Lab 11/30/21  1201 11/30/21  0748 11/30/21  0431 11/30/21  0424 11/29/21  1703 11/29/21  1535 11/29/21  0532 11/29/21  0504 11/28/21  1715 11/28/21  1538   NA  --   --  139  --   --  138  --  137  137  --  139   POTASSIUM  --   --  4.0  --   --  4.4  --  4.3  4.3  --  4.3   CHLORIDE  --   --  103  --   --  102  --  101  101  --  102   ERIK  --   --  9.0  --   --  9.0  --  9.1  9.1  --  8.8   CO2  --   --  30  --   --  30  --  32  32  --  31   BUN  --   --  52*  --   --  58*  --  60*  60*  --  57*   CR  --   --  0.82  --   --  0.87  --  0.93  0.93  --  0.78   * 124* 133* 143*   < > 206*   < > 137*  137*   < > 164*    < > = values in this interval not displayed.     CBC  Recent Labs   Lab 11/30/21  0431 11/29/21  0504 11/28/21  0604 11/27/21  0732   WBC 10.4 12.2* 11.7* 14.3*   RBC 2.92* 3.00* 3.00* 3.26*   HGB 8.9* 9.2* 9.3* 9.9*   HCT 28.3* 29.5* 29.1* 32.5*   MCV 97 98 97 100   MCH 30.5 30.7 31.0 30.4   MCHC 31.4* 31.2* 32.0 30.5*   RDW 15.6* 15.7* " 15.9* 16.0*   PLT 81* 76* 71* 78*     INR  Recent Labs   Lab 11/30/21  0431 11/29/21  0504 11/28/21  0604 11/27/21  0732   INR 1.49* 1.31* 1.43* 1.51*      Hepatic Panel   Recent Labs   Lab 11/30/21  0431 11/29/21  0504 11/28/21  0604 11/27/21  0732   AST 25 26 25 22   ALT 27 25 25 27   ALKPHOS 106 108 114 133   BILITOTAL 0.1* 0.2 0.2 0.2   ALBUMIN 2.2* 2.2* 2.1* 2.3*     Glucose  Recent Labs   Lab 11/30/21  1201 11/30/21  0748 11/30/21  0431 11/30/21  0424 11/30/21  0125 11/29/21  2010   * 124* 133* 143* 117* 231*       Imaging:  Recent Results (from the past 24 hour(s))   XR Chest 2 Views    Narrative    Exam: XR CHEST 2 VW, 11/30/2021 8:51 AM    Indication: chest tube in place    Comparison: 11/29/2021 and 11/27/2021 chest x-ray    Findings:   Upright, AP and lateral views the chest. Tracheostomy cannula, left  PICC and left basilar chest tube are stable in position. Cardiac  silhouette is within normal limits of size. Low lung volumes with  stable, mixed opacities in the lung bases. Small bilateral pleural  effusions. No pneumothorax.      Impression    Impression:   1. Stable position of support devices.  2. Stable, low lung volumes with small bilateral pleural effusions and  mixed opacities in the lung bases, suggestive of atelectasis and/or  mild pulmonary edema.    I have personally reviewed the examination and initial interpretation  and I agree with the findings.    DAVIN ANGUIANO MD         SYSTEM ID:  [id number]

## 2021-11-30 NOTE — PROGRESS NOTES
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 Post-op course complicated encephalopathy, and diffuse weakness, acute to subacute by CVA, afib with RVR, BRENNAN, candidemia/candida empyema, and Code Blue due to bradycardia/asystole and hypotension for significant GIB s/p EGD with adherent clot near PEG tube site s/p clips. Transferred from ICU to medicine on 11/23/2021.      Plan for today:  Await pleural cultures, negative to date  Start capping trials  Monitor Left chest tube output, will need to discuss with pulm/CVTS clamping/removal.    Discussed with him benefit of clinical psychology.  Pt says he he will think about it.     # ILD and NSIP s/p BSLT on 10/16/2021, POD 38  # Acute hypoxic respiratory failure s/p tracheostomy on 10/29  # Bilateral pleural effusions   - Transplant pulmonology following, appreciate recs   - IS: s/p basiliximab on 10/16 and 10/20. Continue tacrolimus 2 mg QAM and 2.5 mg QPM (goal 8-12), MMF 1000 mg BID, and prednisone 12.5 mg QAM and 10 mg QPM  - Ppx: Continue bactrim 400-80 mg daily, nystatin QID x6 months  - Monthly Coccidioides Ag x12 months post-transplant per ID (urine and serum negative 11/17)   - DSA every 2 weeks   - Levalbuterol and mucomyst QID and pulm toilet and chest PT QID  - O2 via trach dome as tolerated otherwise BiPAP for support   - Daily CXR   - Diuresis with IV lasix 40 mg BID  - Oxycodone 5 mg Q4H PRN    - PT/OT/SLP   - Clinically improving, capping trials to start  shortly.     # L lung cavitary lesion - suspect aspiration vs pseudomonal vs K pneumonia induced abscess. CT on 10/25 with LLL nodular opacity. Repeat CT chest on 11/22 with new cavitary lesion in the left lung base, and with multifocal bilateral upper lobe GGO (some of which are new), new 1.8 cm fluid collection with surrounding fat stranding in the left axilla, decreased bilateral loculated pleural effusions. Indeterminate Quant Gold, but negative tuberculin skin tests on mulitple occurences. S/p BAL on 11/22. ID feels less likely fungal.   - Transplant ID following   - Follow up histo and blastomyces serum ab, BDG fungitell and aspergillus Ag (11/22)   - Follow up BAL studies   - ID recommends stopping zosyn (11/15-11/23), start Ceftazdime 2 grams Q8H and levaquin 750 mg daily    - Follow up CT chest in 4 weeks  - Diagnostic/therapeutic thoracentesis 11/27. Exudative, cell count with lymphocyte predominance               - cultures pending     # Klebsiella pneumoniae and Pseudomonas fluorescens/putida HAP- Klebsiella initially noted trach sputum culture 11/10. Bronch culture 11/12 with Klebsiella and Pseudomonas fluorescens/putida (R-meropenem).     - Abx as above.       # Disseminated Candida - + candida BCx 10/20 and 10/22.  BDG fungitell positive (399) on 10/20. Sputum Cx + Candida albicans persistently.  TC 10/23 without evidence of endocarditis. Ophthalmology consult 10/24 with benign dilated fundoscopic exam.  Candida empyema also noted 10/25, chest tubes inadvertently removed by CVTS 10/28, left chest tube replaced by IR 11/3 as above with ongoing Candida on cultures. Repeat pleural fungal cultures and fungal/bacterial blood cultures on 11/18 NGTD. Transplant as noted above following   - L sided chest tube in place  - Follow up BDG fungitell and Aspergillus galactomannan Ag (11/22)   - Initially on Micafungin (10/22-10/27) transition to fluconazole 400 mg IV daily (start date 10/26), with plans to  re-evaluate on 11/24 (was held for one day, then restarted.   - EKG for QTc monitoring (ordered for 11/27)   - ID recommends evacuate any remaining R and/or left loculated effusions      # Hypogammaglobinemia - s/p IVIG with plan to repeat IgG on 12/15     # Encephalopathy - likely multi-factorial. Multiple brain imaging with stroke as noted below, but negative for PRESS. Ammonia negative. vEEG with severe diffuse encephalopathy. LP as noted below negative for infection. Neurology previously following.   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 MRI brain on 10/23 with multifocal subacute infarct within both cerebral hemispheres and left cerebellum. Presumed embolic with afib hx.   - Anticoagulation as noted below      # Afib with hx of RVR - [id number] for HTN, CVA, and DM2. First noted on 10/18, started on amiodarone drip, and converted to NSR. Metoprolol and amidoarone discontinued on 11/20 d/t intermittent bradycardia.   - Anticoagulation with warfarin as noted below  - Continue Rosuvastatin 10 mg daily      # R subclavian DVT - dx on 11/4. Resumed on heparin drip on 11/5 and transitioned to warfarin in setting of thrombocytopenia. HIT panel negative on 11/21.   - Continue warfarin per pharmacy protocol (held on 11/26 for thora) will restart.       # DM2 with steroid induced hyperglycemia - Hemoglobin A1c 6.6 pre-operatively. Endocrine recently consulted for steroids induced hyperglycemia.   - Continue Lantus 30 mg BID  - Novolog High Sliding scale insulin Q4H  - BG Q4H      # Hypernatremia - Na 146. Currently receiving 100 mL of free water via TF Q4H. Trend BMP      # Hypomagnesemia - Likely 2/2 suppressed reabsorption from CNI.   - Sliding scale replacement protocol       # Diarrhea - C. Diff negative on 11/20. Rectal tube in place  on 11/22. Possibly due to tube feeds or magnesium supplementation.   - Monitor I&O      # Malnutrition - S/p PEGJ  - MVI, folic acid, B6, B12 supplements   - TF per nutrition      ------ Chronic stable medical problems ------     # RA- Dx 5/2021 with + CCP ab and RF. Previously treated with rituximab. Rheum consulted early in admission. On steroids as noted above.   # SALTY - Uses CPAP at bedtime prior to admission. Will need to evaluate for BiPAP after decannulation   # HTN- Continue PTA amlodipine 5 mg daily. PRN labetalol and hydralazine for goal SBP <140   # Anxiety and depression - Continue PTA lexapro 5 mg daily   # Hypothyroidism - TSH 3.17 on 11/19/21. Continue PTA levothyroxine 25 mcg daily      ------ Resolved Hospital problems -------     # Recurrent GIB - hgb drop on 10/22 s/p 2 unit pRBC transfusion with EGD on 10/23 with NJ/OG tube trauma with scant oozing. Patient then developed progressive hypotension and ultimately CODE BLUE for GIB in setting of anticoagulation with heparin drip, s/p MTP. EGD on 11/2 with large amount of clotted blood in stomach and area of raised mucosa with small adherent clot near PEG tube site that was clipped, no active bleeding.  Maroon stools 11/3, repeat EGD with extensive old blood in stomach, no active bleeding, small nodular area with prior clips clipped again.  Most recent EGD 11/5 with ulcer noted at PEG tube bumper site, gastritis, and suction marks from G tube. TF noted in G tube drainage bag 11/7, AXR with GJ tube tip projecting over proximal duodenum. GJ tube exchanged 11/9 by GI.  - PO PPI BID      # Transaminitis - elevated LFTs post-op, thought to be due to shock liver      # BRENNAN - post operative BRENNAN, Cr peaked at 2.05, with renal function back at baseline with Cr at 0.7     # Recurring fevers - Fevers post-op, Tmax 101.7 POD #1.  Febrile with worsening leukocytosis again 10/25, generally persisting.  LP (10/29), xanthochromic with pleocytosis thought to be  appropriate given RBC and WBCs, no ABX recommended per transplant ID and neurology.  S/p empiric meropenem 10/28-11/2.  Now afebrile, ABX as above.     # HSV-  Chronic intermittent active infection pre-transplant with recent HSV infection: crusted lesions throughout left side of jaw, s/p 10 day treatment course of ACV through 10/9.  HSV PCR blood negative 10/17.  S/p ACV ppx as above (started POD #1 instead of POD #8 given HSV history and location).             Diet: Regular Diet Adult  Adult Formula Drip Feeding: Continuous Pivot 1.5; Jejunostomy; Goal Rate: 80; mL/hr; From: 4:00 PM; 8:00 AM; Medication - Feeding Tube Flush Frequency: At least 15-30 mL water before and after medication administration and with tube clogging; Amou...  Calorie Counts    DVT Prophylaxis: Heparin SQ  Watson Catheter: Not present  Central Lines: None  Code Status: Full Code      Disposition Plan   Expected discharge:   7 days  recommended to transitional care unit once antibiotic plan established.     The patient's care was discussed with the Patient and Pulm Consultant.    Vincent Koo MD  Hospitalist Service  [facility]  Securely message with the Vocera Web Console (learn more here)  Text page via [facility] Paging/Directory    Please see sign in/sign out for up to date coverage information    Clinically Significant Risk Factors Present on Admission                ______________________________________________________________________    Interval History   Patient seen and examined.  No acute overnight events.  Breathing is improved.  No significant chest pain.  Nausea has resolved.  No HA, dizziness, CP, N/V, abdominal pain or other symptoms .    Data reviewed today: I reviewed all medications, new labs and imaging results over the last 24 hours. I personally reviewed no images or EKG's today.    Physical Exam   Vital Signs: Temp: 97  F (36.1  C) Temp src: Axillary BP: 129/83 Pulse: 72    Resp: 10 SpO2: 96 % O2 Device: None (Room air) Oxygen Delivery: 25 LPM  Weight: 145 lbs 15.11 oz  General Appearance: NAD  Respiratory: CTA b/l  Cardiovascular: RRR S1/S2, no m,r,g  GI: soft, NT, ND, +BS  Skin: no rashes  Other: No edema     Data   Recent Labs   Lab 11/30/21  0748 11/30/21  0431 11/30/21  0424 11/29/21  1703 11/29/21  1535 11/29/21  0532 11/29/21  0504 11/28/21  1207 11/28/21  0604   WBC  --  10.4  --   --   --   --  12.2*  --  11.7*   HGB  --  8.9*  --   --   --   --  9.2*  --  9.3*   MCV  --  97  --   --   --   --  98  --  97   PLT  --  81*  --   --   --   --  76*  --  71*   INR  --  1.49*  --   --   --   --  1.31*  --  1.43*   NA  --  139  --   --  138  --  137  137   < > 140  140   POTASSIUM  --  4.0  --   --  4.4  --  4.3  4.3   < > 4.1  4.1   CHLORIDE  --  103  --   --  102  --  101  101   < > 104  104   CO2  --  30  --   --  30  --  32  32   < > 32  32   BUN  --  52*  --   --  58*  --  60*  60*   < > 60*  60*   CR  --  0.82  --   --  0.87  --  0.93  0.93   < > 0.78  0.78   ANIONGAP  --  6  --   --  6  --  4  4   < > 4  4   ERIK  --  9.0  --   --  9.0  --  9.1  9.1   < > 9.1  9.1   * 133* 143*   < > 206*   < > 137*  137*   < > 169*  169*   ALBUMIN  --  2.2*  --   --   --   --  2.2*  --  2.1*   PROTTOTAL  --  5.4*  --   --   --   --  5.7*  --  5.8*   BILITOTAL  --  0.1*  --   --   --   --  0.2  --  0.2   ALKPHOS  --  106  --   --   --   --  108  --  114   ALT  --  27  --   --   --   --  25  --  25   AST  --  25  --   --   --   --  26  --  25    < > = values in this interval not displayed.     Recent Results (from the past 24 hour(s))   XR Chest Port 1 View    Narrative    Exam: XR CHEST PORT 1 VIEW, 11/29/2021 1:14 PM    Indication: chest tube  in place    Comparison: 11/27/2021 chest x-ray    Findings:   Portable, semiupright view of the chest. Tracheostomy cannula, left  PICC and left basilar chest tube are stable in position. Midline  trachea. Cardiac silhouette is  within normal limits of size. Lung  volumes are low. Small bilateral pleural effusions with overlying  mixed opacities. No pneumothorax.      Impression    Impression:   1. Stable position of support devices  2. Low lung volumes with small bilateral pleural effusions and  overlying opacities, most suggestive of atelectasis.    I have personally reviewed the examination and initial interpretation  and I agree with the findings.    FITO PERALES MD         SYSTEM ID:  [id number]     Medications     dextrose Stopped (10/24/21 1008)     Warfarin Therapy Reminder         acetylcysteine  2 mL Nebulization BID     amLODIPine  5 mg Per G Tube Daily     calcium carbonate 600 mg-vitamin D 400 units  1 tablet Oral or Feeding Tube BID w/meals     cefTAZidime  2 g Intravenous Q8H     escitalopram  5 mg Oral or Feeding Tube Daily     fiber modular (NUTRISOURCE FIBER)  1 packet Per Feeding Tube BID     fluconazole  400 mg Intravenous Q24H     folic acid-vit B6-vit B12  1 tablet Per G Tube Daily     furosemide  40 mg Intravenous BID     heparin lock flush  5-20 mL Intracatheter Q24H     insulin aspart  1-12 Units Subcutaneous Q4H     insulin glargine  30 Units Subcutaneous BID     levalbuterol  1.25 mg Nebulization BID     levofloxacin  750 mg Intravenous Q24H     levothyroxine  25 mcg Oral Daily     lidocaine  2 patch Transdermal Q24H     lidocaine   Transdermal Q8H     magnesium oxide  400 mg Oral BID     melatonin  10 mg Oral QPM     multivitamins w/minerals  15 mL Per Feeding Tube Daily     mycophenolate  1,000 mg Per J Tube BID     nystatin  1,000,000 Units Swish & Swallow 4x Daily     pantoprazole  40 mg Per Feeding Tube BID     predniSONE  10 mg Per J Tube QPM     predniSONE  12.5 mg Per J Tube Daily     QUEtiapine  25 mg Oral or Feeding Tube BID     QUEtiapine  50 mg Oral At Bedtime     rosuvastatin  10 mg Oral or Feeding Tube Daily     sodium chloride (PF)  10-40 mL Intracatheter Q8H     sodium chloride (PF)  3 mL  Intracatheter Q8H     sulfamethoxazole-trimethoprim  1 tablet Oral or Feeding Tube Daily     tacrolimus  2 mg Oral QPM     tacrolimus  2.5 mg Oral QAM     warfarin ANTICOAGULANT  2 mg Oral ONCE at 18:00

## 2021-11-30 NOTE — PROGRESS NOTES
Pulmonary Medicine  Cystic Fibrosis - Lung Transplant Team  Progress Note  2021       Patient: Edson Thornton  MRN: [id number]  : 1965 (age 56 year old)  Transplant: 10/16/2021 (Lung), POD#45  Admission date: 2021    Assessment & Plan:     Edson Thornton is a 56 year old male with a PMH significant for NSIP/ILD, bronchiectasis, moderate PH, RA, SALTY, chronic HSV infection, hypogammaglobulinemia, steroid-induced diabetes, hypothyroidism, PFO, HTN, HLD, duodenal anomaly, anxiety, and depression.  Admitted on 21 from OSH for acute on chronic respiratory failure 2/2 ILD exacerbation, now s/p BSLT on 10/16/21.  Prolonged vent wean s/p trach and PEG/J tube placement with thoracic surgery 10/29.  Post-op course otherwise complicated by encephalopathy and diffuse weakness, acute to subacute CVA, afib with RVR, BRENNAN, GI bleed, Candidemia/Candida empyema, and anxiety.  Code called  for bradycardia/asystole, progressive hypotensive, found to have significant GI bleed, EGD with adherent clot near PEG tube site that was clipped.  Improved mentation with ongoing improvement in weakness.  Tolerating continuous TD since , weaned to RA , and tolerated capping trial .     Today's recommendations:  - Follow pending pleural cultures/studies  - TD with cuff down and PMSV during the day, capping trial as long as tolerated  - VBG ordered tomorrow  - CXR daily, left chest tube management per CVTS  - Tacrolimus level ordered   - Prednisone taper due  (not yet ordered)  - CMV and EBV due  (not yet ordered)  - Coccidioides Ag due  (not yet ordered)  - ABX duration per transplant ID (will need repeat chest CT around time of transplant ID follow up )  - Continue fluconazole through  per transplant ID, EKG for QTc monitoring ordered   - Repeat IgG ordered 12/15  - DSA () pending    S/p BSLT for ILD:  Acute hypoxic respiratory failure, Resolved:   Prolonged vent  weaning s/p trach:  Bilateral pleural effusions: Unfortunately had not received vaccination for flu, PNA, or COVID-19 PTA.  Explant pathology with NSIP, no malignancy.  PGD 2-3.  Weaned off paralytic 10/19 (for vent dyssynchrony) and Caroline 10/22.  Initial difficulty weaning sedation given agitation then with neurological findings as below.  Prolonged vent wean s/p trach and PEG/J tube placement with thoracic surgery 10/29.  Code called 11/2 for bradycardia/asystole (required 1 round of CPR, no medications) then progressively hypotensive, GI bleed as below.  Chest CT 11/2 with increased bilateral pleural effusions (moderate left, small right), bibasilar atelectasis with area of hypoenhancing parenchyma in LLL (suspicious for infection), and numerous nondisplaced anterior rib fractures bilaterally.  S/p left chest tube placement in IR 11/3 for pleural effusion/empyema (as below), right deferred given very little effusion on US, s/p lytics 11/25-11/28 (CXR showing trapped left lung) and right thoracentesis 11/27.  Problems with trach cuff leak 11/3 (requiring multiple exchanges), bronch 11/14 with trach in good position with cuff well sealed in trachea and small to moderate secretions mostly in lower lobes.  Chest CT 11/22 with new cavitary lesion in the left lung base with multifocal GGO in bilateral upper lobes, new 1.8 cm fluid collection with surrounding fat stranding in the left axilla, decreased bilateral loculated pleural effusions, and similar small pericardial effusion.  Tolerating continuous TD since 11/20, weaned to RA 11/28, and tolerated 30 minute capping trial with SLP 11/28.   - Right pleural cultures (11/27) NGTD (note: numerous atypical lymphoid cells present, correlate with flow cytometry added 11/29)  - Nebs: levalbuterol and Mucomyst BID  - Pulmonary toilet with chest physiotherapy BID  - TD with cuff down and PMSV during the day, capping trial as long as tolerated  - VBG 12/1 (ordered)  - Diuresis  per primary team  - CXR daily with left chest tube in place, today with stable, low lung volumes with small bilateral pleural effusions and mixed opacities in left lung base (personally reviewed with Dr. Sifuentes)  - Left chest tube management per CVTS  - Defer bronch, revisit PRN     Immunosuppression: s/p induction therapy with basiliximab 10/16 (and high dose IV steroid) and 10/20  - Tacrolimus 2.5 mg qAM / 2 mg qPM (increased 11/30, suspension, via G tube).  Goal level 8-12.  Repeat level 12/2 (ordered).   - MMF 1000 mg BID (11/2, decreased given GI bleed, AZA to be avoided given TPMT)  - Prednisolone 12.5 mg qAM / 10 mg qPM, next taper due 12/5 (not yet ordered)  Date AM dose (mg) PM dose (mg)   11/21/21 12.5 10   12/5/21 10 10   1/2/22 10 7.5   1/30/22 7.5 7.5   2/27/22 7.5 5   3/27/22 5 5   4/24/22 5 2.5      Prophylaxis:   - Bactrim for PJP ppx (held 11/2-11/5 d/t BRENNAN)  - Nystatin for oral candidiasis ppx, 6 month course  - See below for serologies and viral ppx:    Donor Recipient Intervention   CMV status Negative Negative None, CMV monthly (due 12/14, neg 11/16)   EBV status Positive Positive None, EBV monthly (due 12/14, neg 11/16)   HSV status N/A Positive S/p acyclovir POD #1-30 (recent infection history pre-txp)      ID: Concern for possible Strongyloides exposure pre-transplant s/p ivermectin x1 dose (9/17).  Donor and recipient cultures NGTD.  S/p IV ceftazidime/vancomycin for 48h per protocol and additional empiric ceftazidime 10/19-10/23 given recurrent fevers as below.  Cryptococcal Ag negative 10/29.  Histo and Blasto Ag negative 11/22   - Monthly blood Coccidioides Ag x12 months post-transplant per ID (urine and serum negative 11/17), due 12/17 (not yet ordered)     Recurring fevers, Resolved:  Fevers post-op, Tmax 101.7 POD #1.  Febrile with worsening leukocytosis again 10/25, generally persisting.  LP (10/29), xanthochromic with pleocytosis thought to be appropriate given RBC and WBCs,  no ABX recommended per transplant ID and neurology.  S/p empiric meropenem 10/28-11/2.       Klebsiella pneumoniae:  Pseudomonas fluorescens/putida:   LLL cavity: Klebsiella initially noted trach sputum culture 11/10.  Started on ceftriaxone 11/11, transitioned to ertapenem 11/12-11/13, and back to ceftriaxone 11/14.  Bronch culture 11/12 with Klebsiella and Pseudomonas fluorescens/putida (R-meropenem).  Chest CT 11/22 with LLL cavity as above, BAL with Klebsiella pneumoniae.   - ABX: ceftazidime (11/23-12/21) and levofloxacin (11/23-12/7) per transplant ID; s/p Zosyn (11/15-11/23)  - Transplant ID follow up scheduled 12/21 with repeat chest CT prior      Disseminated Candida: Noted on blood cultures 10/20 and 10/22.  BDG fungitell positive (399) on 10/20.  Respiratory cultures with persistent Candida albicans.  TC 10/23 without evidence of endocarditis.  Ophthalmology consult 10/24 with benign dilated fundoscopic exam.  Candida empyema also noted 10/25, chest tubes inadvertently removed by CVTS 10/28, left chest tube replaced by IR 11/3 as above with ongoing Candida on cultures (11/3, 11/18).  BDG fungitell positive (>500) and Aspergillus galactomannan negative 11/22.  - Fluconazole (10/26-12/21) per transplant ID, repeat EKG for QTc monitoring 12/7 (orderd); s/p micafungin 10/22-10/27  11/26/2021: We will do thoracentesis on Rt side (Diagnostic/therapeutic) and left side lytics BID x3 days - started on 11/25/21.     HSV: Chronic intermittent active infection pre-transplant with recent HSV infection: crusted lesions throughout left side of jaw, s/p 10 day treatment course of ACV through 10/9.  HSV PCR blood negative 10/17.  S/p ACV ppx as above (started POD #1 instead of POD #8 given HSV history and location).     Hypogammaglobulinemia: IgG previously low at 364 (9/7).  Noted at 265 at time of transplant, s/p IVIG 10/21, repeat IgG (11/17) 198, s/p IVIG (with premedication) 11/18.  - Repeat IgG (12/15,  ordered)     Positive cross match: Note that he received two doses of rituximab in June, which is likely contributing to cross match result.  DSA most recently negative 11/15.  - DSA monitoring q2 weeks, (11/29) ordered     SALTY: Noted pre-transplant.  Home CPAP 6-12 cm H2O.  - Evaluate need for BiPAP after decannulation.     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  MRI 10/23 with multifocal subacute infarcts within both cerebral hemispheres and left cerebellum.  DDx include surgery v embolic v infectious.  Heparin drip started 10/23 (intermittently held with GI bleed as below).  Repeat stroke code 10/25 d/t marked decrease in responsiveness with sedation wean, pupil inequality, and absent gag reflex.  CTA head without obvious new pathology, MRI brain (with and without contrast) primarily revealing for infarct, low likelihood of PRES.  Ammonia normal.  VEEG per neuro with severe diffuse encephalopathy.  Ongoing improvement since 10/29, repeat head CT 11/2 (following code) without new acute intracranial abnormalities.  - AC management per primary team as below   - PT/OT      Afib with RVR: Noted 10/18, started on amiodarone drip and converted to SR, transitioned to PO 10/21, decreased 10/29. Metoprolol and amiodarone discontinued 11/20 d/t intermittent bradycardia.  AC per primary team.     Right subclavian DVT: Seen on UE US from 11/4.  Heparin drip resumed 11/5, transitioned to warfarin 11/20 and in the setting of thrombocytopenia.  HIT negative 11/21.      Recurrent GI bleed, Resolved: Hemoglobin dropped to 6.6 10/22, s/p 2 units pRBC.  OG tube with bloody output, EGD 10/23 noted NJ/OG tube trauma with scant oozing.  Progressive  hypotension 11/2, hemoglobin 5.8 with bloody G tube output.  Heparin drip held, transitioned to PPI drip, and MTP activated.  EGD 11/2 with large amount of clotted blood in stomach and area of raised mucosa with small adherent clot near PEG tube site that was clipped, no active bleeding.  Maroon stools 11/3, repeat EGD with extensive old blood in stomach, no active bleeding, small nodular area with prior clips clipped again.  Most recent EGD 11/5 with ulcer noted at PEG tube bumper site, gastritis, and suction marks from G tube. TF noted in G tube drainage bag 11/7, AXR with GJ tube tip projecting over proximal duodenum. GJ tube exchanged 11/9 by GI.      Elevated LFTs, Resolved: Shock liver post-op, now resolved.  Intermittent alk phos elevation, recently normal.     Hypomagnesemia: Suppressed reabsorption 2/2 CNI.  Requiring nearly daily IV replacement.  - Mag-Ox 400 mg BID    We appreciate the excellent care provided by the [place] [id number] team.  Recommendations communicated via in person rounding and this note.  Will continue to follow along closely, please do not hesitate to call with any questions or concerns.    Patient discussed with Dr. Sifuentes.    Keyla Rice PA-C  Inpatient PRINCESS  Pulmonary CF/Transplant     Subjective & Interval History:     Slept well.  On continuous TD with FiO2 recently 21%, tolerating PMSV.  Left chest tube with minimal output.  Having intermittent incisional pain.  Occasionally requiring suctioning, received chest physiotherapy BID yesterday.  Slightly net negative with diuresis yesterday.  Up to the chair for an hour, feels strength is gradually improving.      Review of Systems:     C: No fever, no chills, no change in weight, + decreased appetite  INTEGUMENTARY/SKIN: No rash or obvious new lesions  ENT/MOUTH: No sore throat, no sinus pain, no nasal congestion or drainage  RESP: See interval history  CV: No palpitations, no peripheral edema  GI: No nausea, no vomiting, no change  "in stools, no reflux symptoms  : No dysuria  MUSCULOSKELETAL: No myalgias, no arthralgias  ENDOCRINE:+ blood sugars intermittently elevated  NEURO: No headache, no numbness or tingling  PSYCHIATRIC: Mood stable    Physical Exam:     Vital signs:  Temp: 97  F (36.1  C) Temp src: Axillary BP: 129/83 Pulse: 72   Resp: 10 SpO2: 96 % O2 Device: None (Room air) Oxygen Delivery: 25 LPM Height: 162.6 cm (5' 4.02\") Weight: 66.2 kg (145 lb 15.1 oz)  I/O:     Intake/Output Summary (Last 24 hours) at 11/30/2021 1032  Last data filed at 11/29/2021 1800  Gross per 24 hour   Intake 735 ml   Output 1015 ml   Net -280 ml     Constitutional: Sitting up in bed, in no apparent distress.   HEENT: Eyes with pink conjunctivae, anicteric.  Oral mucosa moist without lesions.  Trach with PMSV in place.  PULM: Mildly diminished air flow to bases bilaterally.  No crackles, no rhonchi, no wheezes.  Non-labored breathing on RA.  CV: Normal S1 and S2.  RRR.  No murmur, gallop, or rub.  No peripheral edema.   ABD: NABS, soft, nontender, nondistended.    MSK: Moves all extremities.  + muscle wasting.   NEURO: Alert, conversant, answering questions appropriately.  SKIN: Warm, dry.  No rash on limited exam.   PSYCH: Mood stable, calm.     Lines, Drains, and Devices:  PICC Triple Lumen 11/04/21 Left Basilic Access. PICC okay to use. (Active)   Site Assessment WDL 11/29/21 1600   External Cath Length (cm) 1 cm 11/28/21 1038   Extremity Circumference (cm) 28 cm 11/28/21 1038   Dressing Intervention Chlorhexidine patch;Transparent 11/30/21 0340   Dressing Change Due 12/05/21 11/29/21 1900   PICC Comment CDI/Statlock 11/28/21 1038   [place] - Status saline locked 11/30/21 0340   Gray - Cap Change Due 11/30/21 11/29/21 1600   Red - Status saline locked 11/30/21 0340   Red - Cap Change Due 11/30/21 11/29/21 1600   White - Status saline locked 11/30/21 0340   White - Cap Change Due 11/30/21 11/29/21 1600   Extravasation? No 11/29/21 1600   Line Necessity Yes, " meets criteria 11/30/21 0340   Number of days: 26     Data:     LABS    CMP:   Recent Labs   Lab 11/30/21  0748 11/30/21  0431 11/30/21  0424 11/30/21  0125 11/29/21  1703 11/29/21  1535 11/29/21  0532 11/29/21  0504 11/28/21  1715 11/28/21  1538 11/28/21  1207 11/28/21  0604 11/27/21  0753 11/27/21  0732   NA  --  139  --   --   --  138  --  137  137  --  139  --  140  140   < > 139  139   POTASSIUM  --  4.0  --   --   --  4.4  --  4.3  4.3  --  4.3  --  4.1  4.1   < > 4.2  4.2   CHLORIDE  --  103  --   --   --  102  --  101  101  --  102  --  104  104   < > 106  106   CO2  --  30  --   --   --  30  --  32  32  --  31  --  32  32   < > 29  29   ANIONGAP  --  6  --   --   --  6  --  4  4  --  6  --  4  4   < > 4  4   * 133* 143* 117*   < > 206*   < > 137*  137*   < > 164*   < > 169*  169*   < > 159*  159*   BUN  --  52*  --   --   --  58*  --  60*  60*  --  57*  --  60*  60*   < > 65*  65*   CR  --  0.82  --   --   --  0.87  --  0.93  0.93  --  0.78  --  0.78  0.78   < > 0.72  0.72   GFRESTIMATED  --  >90  --   --   --  >90  --  >90  >90  --  >90  --  >90  >90   < > >90  >90   ERIK  --  9.0  --   --   --  9.0  --  9.1  9.1  --  8.8  --  9.1  9.1   < > 9.0  9.0   MAG  --  1.7  --   --   --   --   --  1.7  --   --   --  1.6  --  1.5*   PHOS  --  3.2  --   --   --   --   --  3.7  --   --   --  3.1  --  2.9   PROTTOTAL  --  5.4*  --   --   --   --   --  5.7*  --   --   --  5.8*  --  5.9*   ALBUMIN  --  2.2*  --   --   --   --   --  2.2*  --   --   --  2.1*  --  2.3*   BILITOTAL  --  0.1*  --   --   --   --   --  0.2  --   --   --  0.2  --  0.2   ALKPHOS  --  106  --   --   --   --   --  108  --   --   --  114  --  133   AST  --  25  --   --   --   --   --  26  --   --   --  25  --  22   ALT  --  27  --   --   --   --   --  25  --   --   --  25  --  27    < > = values in this interval not displayed.     CBC:   Recent Labs   Lab 11/30/21  0431 11/29/21  0504 11/28/21  0604  11/27/21  0732   WBC 10.4 12.2* 11.7* 14.3*   RBC 2.92* 3.00* 3.00* 3.26*   HGB 8.9* 9.2* 9.3* 9.9*   HCT 28.3* 29.5* 29.1* 32.5*   MCV 97 98 97 100   MCH 30.5 30.7 31.0 30.4   MCHC 31.4* 31.2* 32.0 30.5*   RDW 15.6* 15.7* 15.9* 16.0*   PLT 81* 76* 71* 78*       INR:   Recent Labs   Lab 11/30/21  0431 11/29/21  0504 11/28/21  0604 11/27/21  0732   INR 1.49* 1.31* 1.43* 1.51*       Glucose:   Recent Labs   Lab 11/30/21  0748 11/30/21  0431 11/30/21  0424 11/30/21  0125 11/29/21 2010 11/29/21  1757   * 133* 143* 117* 231* 201*       Blood Gas: No lab results found in last 7 days.    Culture Data No results for input(s): CULT in the last 168 hours.    Virology Data:   Lab Results   Component Value Date    FLUAH1 Negative 11/22/2021    FLUAH3 Negative 11/22/2021    OF2847 Negative 11/22/2021    IFLUB Negative 11/22/2021    RSVA Negative 11/22/2021    RSVB Negative 11/22/2021    PIV1 Negative 11/22/2021    PIV2 Negative 11/22/2021    PIV3 Negative 11/22/2021    HMPV Negative 11/22/2021    HRVS Negative 11/22/2021    ADVBE Negative 11/22/2021    ADVC Negative 11/22/2021    ADVC Negative 11/12/2021    ADVC Negative 10/17/2021       Historical CMV results (last 3 of prior testing):  Lab Results   Component Value Date    CMVQNT Not Detected 11/22/2021    CMVQNT Not Detected 11/16/2021    CMVQNT Not Detected 11/12/2021     No results found for: CMVLOG    Urine Studies    Recent Labs   Lab Test 11/01/21  1336 10/25/21  1507   URINEPH 5.5 5.5   NITRITE Negative Negative   LEUKEST Negative Negative   WBCU 0 2       Most Recent [facility] Pulmonary Function Testing (FVC/FEV1 only)  No results found for: 20002  No results found for: 20003  No results found for: 20015  No results found for: 20016    IMAGING    Recent Results (from the past 48 hour(s))   XR Chest Port 1 View    Narrative    Exam: XR CHEST PORT 1 VIEW, 11/29/2021 1:14 PM    Indication: chest tube  in place    Comparison: 11/27/2021 chest x-ray    Findings:    Portable, semiupright view of the chest. Tracheostomy cannula, left  PICC and left basilar chest tube are stable in position. Midline  trachea. Cardiac silhouette is within normal limits of size. Lung  volumes are low. Small bilateral pleural effusions with overlying  mixed opacities. No pneumothorax.      Impression    Impression:   1. Stable position of support devices  2. Low lung volumes with small bilateral pleural effusions and  overlying opacities, most suggestive of atelectasis.    I have personally reviewed the examination and initial interpretation  and I agree with the findings.    FITO PERALES MD         SYSTEM ID:  [id number]

## 2021-11-30 NOTE — PROGRESS NOTES
[facility]    Medicine Progress Note - Hospitalist Service       Date of Admission:  9/5/2021    Assessment & Plan         Edson Thornton is a 55 yo M with PMHx of NSIP/ILD 2/2 Rheumatoid lung disease, RA, bronchiectasis, moderately pulm HTN, SALTY, HTN, HLD, PLD, hypogammaglobinemia, duodenal anomaly, anxiety, and depression s/p bilateral lung transplant on 10/16/2021, with post operative course complicated by prolonged vent wean s/p trach and PEG/J tube placement with thoracic surgery on 10/29. Post-op course complicated encephalopathy, and diffuse weakness, acute to subacute by CVA, afib with RVR, BRENNAN, candidemia/candida empyema, and Code Blue due to bradycardia/asystole and hypotension for significant GIB s/p EGD with adherent clot near PEG tube site s/p clips. Transferred from ICU to medicine on 11/23/2021.      # ILD and NSIP s/p BSLT on 10/16/2021, POD 38  # Acute hypoxic respiratory failure s/p tracheostomy on 10/29  # Bilateral pleural effusions   - Transplant pulmonology following, appreciate recs   - IS: s/p basiliximab on 10/16 and 10/20. Continue tacrolimus 2 mg QAM and 2.5 mg QPM (goal 8-12), MMF 1000 mg BID, and prednisone 12.5 mg QAM and 10 mg QPM  - Ppx: Continue bactrim 400-80 mg daily, nystatin QID x6 months  - Monthly Coccidioides Ag x12 months post-transplant per ID (urine and serum negative 11/17)   - DSA every 2 weeks   - Levalbuterol and mucomyst QID and pulm toilet and chest PT QID  - O2 via trach dome as tolerated otherwise BiPAP for support   - Daily CXR   - Diuresis with IV lasix 40 mg BID will evaluate daily for continued need  - Oxycodone 5 mg Q4H PRN    - PT/OT/SLP   - Clinically improving starting capping trials     # L lung cavitary lesion - suspect aspiration vs pseudomonal vs K pneumonia induced abscess. CT on 10/25 with LLL nodular opacity. Repeat CT chest on 11/22 with new cavitary lesion in the left lung base, and with  multifocal bilateral upper lobe GGO (some of which are new), new 1.8 cm fluid collection with surrounding fat stranding in the left axilla, decreased bilateral loculated pleural effusions. Indeterminate Quant Gold, but negative tuberculin skin tests on mulitple occurences. S/p BAL on 11/22. ID feels less likely fungal.   - Transplant ID following   - Follow up histo and blastomyces serum ab, BDG fungitell and aspergillus Ag (11/22)   - Follow up BAL studies   - ID recommends stopping zosyn (11/15-11/23), start Ceftazdime 2 grams Q8H (until 12/21) and levaquin 750 mg daily  (until 12/21)  - Follow up CT chest in 4 weeks 12/21  - Please include in the patient's discharge instructions, follow up with Dr. Abebe on 12/21/2021 @6:30 PM in a virtual visit.   - No indicated labs outside of post-transplant labs.     # Klebsiella pneumoniae and Pseudomonas fluorescens/putida HAP- Klebsiella initially noted trach sputum culture 11/10. Bronch culture 11/12 with Klebsiella and Pseudomonas fluorescens/putida (R-meropenem).     - Abx as above.       # Disseminated Candida - + candida BCx 10/20 and 10/22.  BDG fungitell positive (399) on 10/20. Sputum Cx + Candida albicans persistently.  TC 10/23 without evidence of endocarditis. Ophthalmology consult 10/24 with benign dilated fundoscopic exam.  Candida empyema also noted 10/25, chest tubes inadvertently removed by CVTS 10/28, left chest tube replaced by IR 11/3 as above with ongoing Candida on cultures. Repeat pleural fungal cultures and fungal/bacterial blood cultures on 11/18 NGTD. Transplant as noted above following   - L sided chest tube in place  - Follow up BDG fungitell and Aspergillus galactomannan Ag (11/22)   - Initially on Micafungin (10/22-10/27) transition to fluconazole 400 mg IV daily (start date 10/26), with plans to re-evaluate on 11/24 (was held for one day, then restarted.   - ID recommends evacuate any remaining R and/or left loculated effusions      #  Hypogammaglobinemia - s/p IVIG with plan to repeat IgG on 12/15     # Encephalopathy - likely multi-factorial. Multiple brain imaging with stroke as noted below, but negative for PRESS. Ammonia negative. vEEG with severe diffuse encephalopathy. LP as noted below negative for infection. Neurology previously following.   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 MRI brain on 10/23 with multifocal subacute infarct within both cerebral hemispheres and left cerebellum. Presumed embolic with afib hx.   - Anticoagulation as noted below      # Afib with hx of RVR - [id number] 4 for HTN, CVA, and DM2. First noted on 10/18, started on amiodarone drip, and converted to NSR. Metoprolol and amidoarone discontinued on 11/20 d/t intermittent bradycardia.   - Anticoagulation with warfarin as noted below  - Continue Rosuvastatin 10 mg daily      # R subclavian DVT - dx on 11/4. Resumed on heparin drip on 11/5 and transitioned to warfarin in setting of thrombocytopenia. HIT panel negative on 11/21.   - Continue warfarin per pharmacy protocol (held on 11/26 for thora) will restart.       # DM2 with steroid induced hyperglycemia - Hemoglobin A1c 6.6 pre-operatively. Endocrine recently consulted for steroids induced hyperglycemia.   - Continue Lantus 30 mg BID  - Novolog High Sliding scale insulin Q4H  - BG Q4H      # Hypernatremia - Na 146. Currently receiving 100 mL of free water via TF Q4H. Trend BMP      # Hypomagnesemia - Likely 2/2 suppressed reabsorption from CNI.   - Sliding scale replacement protocol       # Diarrhea - C. Diff negative on 11/20. Rectal tube in place on 11/22. Possibly due to tube feeds or magnesium supplementation.   - Monitor I&O      # Malnutrition - S/p PEGJ  - MVI, folic acid, B6, B12 supplements   - TF per nutrition      ------  Chronic stable medical problems ------     # RA- Dx 5/2021 with + CCP ab and RF. Previously treated with rituximab. Rheum consulted early in admission. On steroids as noted above.   # SALTY - Uses CPAP at bedtime prior to admission. Will need to evaluate for BiPAP after decannulation   # HTN- Continue PTA amlodipine 5 mg daily. PRN labetalol and hydralazine for goal SBP <140   # Anxiety and depression - Continue PTA lexapro 5 mg daily   # Hypothyroidism - TSH 3.17 on 11/19/21. Continue PTA levothyroxine 25 mcg daily      ------ Resolved Hospital problems -------     # Recurrent GIB - hgb drop on 10/22 s/p 2 unit pRBC transfusion with EGD on 10/23 with NJ/OG tube trauma with scant oozing. Patient then developed progressive hypotension and ultimately CODE BLUE for GIB in setting of anticoagulation with heparin drip, s/p MTP. EGD on 11/2 with large amount of clotted blood in stomach and area of raised mucosa with small adherent clot near PEG tube site that was clipped, no active bleeding.  Maroon stools 11/3, repeat EGD with extensive old blood in stomach, no active bleeding, small nodular area with prior clips clipped again.  Most recent EGD 11/5 with ulcer noted at PEG tube bumper site, gastritis, and suction marks from G tube. TF noted in G tube drainage bag 11/7, AXR with GJ tube tip projecting over proximal duodenum. GJ tube exchanged 11/9 by GI.  - PO PPI BID      # Transaminitis - elevated LFTs post-op, thought to be due to shock liver      # BRENNAN - post operative BRENNAN, Cr peaked at 2.05, with renal function back at baseline with Cr at 0.7     # Recurring fevers - Fevers post-op, Tmax 101.7 POD #1.  Febrile with worsening leukocytosis again 10/25, generally persisting.  LP (10/29), xanthochromic with pleocytosis thought to be appropriate given RBC and WBCs, no ABX recommended per transplant ID and neurology.  S/p empiric meropenem 10/28-11/2.  Now afebrile, ABX as above.     # HSV-  Chronic intermittent active  infection pre-transplant with recent HSV infection: crusted lesions throughout left side of jaw, s/p 10 day treatment course of ACV through 10/9.  HSV PCR blood negative 10/17.  S/p ACV ppx as above (started POD #1 instead of POD #8 given HSV history and location).             Diet: Regular Diet Adult  Adult Formula Drip Feeding: Continuous Pivot 1.5; Jejunostomy; Goal Rate: 80; mL/hr; From: 4:00 PM; 8:00 AM; Medication - Feeding Tube Flush Frequency: At least 15-30 mL water before and after medication administration and with tube clogging; Amou...  Calorie Counts    DVT Prophylaxis: Heparin SQ  Watson Catheter: Not present  Central Lines: None  Code Status: Full Code      Disposition Plan   Expected discharge:   2-3 days  recommended to transitional care unit once antibiotic plan established.     The patient's care was discussed with the Patient and Pulm Consultant.    Gerardo Mahan DO  Hospitalist Service  [facility]  Securely message with the Vocera Web Console (learn more here)  Text page via [employer] Paging/Directory    Please see sign in/sign out for up to date coverage information    Clinically Significant Risk Factors Present on Admission                ______________________________________________________________________    Interval History   Patient seen and examined.  No acute overnight events. Feeling well today. No issues breathing. No new cough or dyspnea. Tolerating speaking valve but has not had capping yet.    Four point ROS completed and negative unless listed above    Data reviewed today: I reviewed all medications, new labs and imaging results over the last 24 hours. I personally reviewed no images or EKG's today.    Physical Exam   Vital Signs: Temp: 97.7  F (36.5  C) Temp src: Oral BP: 122/86 Pulse: 112   Resp: 25 SpO2: 95 % O2 Device: None (Room air) (Speaking on/ RA) Oxygen Delivery: 25 LPM  Weight: 145 lbs 15.11 oz  General  Appearance: NAD  Respiratory: CTA b/l  Cardiovascular: RRR S1/S2, no m,r,g  GI: soft, NT, ND, +BS  Skin: no rashes  Other: No edema     Data   Recent Labs   Lab 11/30/21  1659 11/30/21  1609 11/30/21  1201 11/30/21  0748 11/30/21  0431 11/29/21  1703 11/29/21  1535 11/29/21  0532 11/29/21  0504 11/28/21  1207 11/28/21  0604   WBC  --   --   --   --  10.4  --   --   --  12.2*  --  11.7*   HGB  --   --   --   --  8.9*  --   --   --  9.2*  --  9.3*   MCV  --   --   --   --  97  --   --   --  98  --  97   PLT  --   --   --   --  81*  --   --   --  76*  --  71*   INR  --   --   --   --  1.49*  --   --   --  1.31*  --  1.43*   NA  --  137  --   --  139  --  138  --  137  137   < > 140  140   POTASSIUM  --  4.5  --   --  4.0  --  4.4  --  4.3  4.3   < > 4.1  4.1   CHLORIDE  --  103  --   --  103  --  102  --  101  101   < > 104  104   CO2  --  30  --   --  30  --  30  --  32  32   < > 32  32   BUN  --  45*  --   --  52*  --  58*  --  60*  60*   < > 60*  60*   CR  --  0.84  --   --  0.82  --  0.87  --  0.93  0.93   < > 0.78  0.78   ANIONGAP  --  4  --   --  6  --  6  --  4  4   < > 4  4   ERIK  --  9.7  --   --  9.0  --  9.0  --  9.1  9.1   < > 9.1  9.1   * 122* 108*   < > 133*   < > 206*   < > 137*  137*   < > 169*  169*   ALBUMIN  --   --   --   --  2.2*  --   --   --  2.2*  --  2.1*   PROTTOTAL  --   --   --   --  5.4*  --   --   --  5.7*  --  5.8*   BILITOTAL  --   --   --   --  0.1*  --   --   --  0.2  --  0.2   ALKPHOS  --   --   --   --  106  --   --   --  108  --  114   ALT  --   --   --   --  27  --   --   --  25  --  25   AST  --   --   --   --  25  --   --   --  26  --  25    < > = values in this interval not displayed.     Recent Results (from the past 24 hour(s))   XR Chest 2 Views    Narrative    Exam: XR CHEST 2 VW, 11/30/2021 8:51 AM    Indication: chest tube in place    Comparison: 11/29/2021 and 11/27/2021 chest x-ray    Findings:   Upright, AP and lateral views the chest.  Tracheostomy cannula, left  PICC and left basilar chest tube are stable in position. Cardiac  silhouette is within normal limits of size. Low lung volumes with  stable, mixed opacities in the lung bases. Small bilateral pleural  effusions. No pneumothorax.      Impression    Impression:   1. Stable position of support devices.  2. Stable, low lung volumes with small bilateral pleural effusions and  mixed opacities in the lung bases, suggestive of atelectasis and/or  mild pulmonary edema.    I have personally reviewed the examination and initial interpretation  and I agree with the findings.    DAVIN ANGUIANO MD         SYSTEM ID:  [id number]   XR Chest Port 1 View    Narrative    Portable chest 11/30/2021 at 1453 hours    COMPARISON: Earlier today 0845 hours    INDICATION: Chest tube removal    FINDINGS: Tracheostomy. Left chest catheter removed. Clamshell  sternotomy from prior bilateral lung transplant. Small left pleural  effusion and trace right pleural effusion with lung base atelectasis  bilaterally.      Impression    IMPRESSION: Trace bilateral pleural effusions. Bilateral lung  transplant. Bibasilar atelectasis. Tracheostomy.    JODI MENDEZ MD         SYSTEM ID:  [id number]     Medications     dextrose Stopped (10/24/21 1008)     Warfarin Therapy Reminder         acetylcysteine  2 mL Nebulization BID     amLODIPine  5 mg Per G Tube Daily     calcium carbonate 600 mg-vitamin D 400 units  1 tablet Oral or Feeding Tube BID w/meals     cefTAZidime  2 g Intravenous Q8H     escitalopram  5 mg Oral or Feeding Tube Daily     fiber modular (NUTRISOURCE FIBER)  1 packet Per Feeding Tube BID     fluconazole  400 mg Intravenous Q24H     folic acid-vit B6-vit B12  1 tablet Per G Tube Daily     furosemide  40 mg Intravenous BID     heparin lock flush  5-20 mL Intracatheter Q24H     insulin aspart  1-12 Units Subcutaneous Q4H     insulin glargine  30 Units Subcutaneous BID     levalbuterol  1.25 mg Nebulization BID      levofloxacin  750 mg Intravenous Q24H     levothyroxine  25 mcg Oral Daily     lidocaine  2 patch Transdermal Q24H     lidocaine   Transdermal Q8H     magnesium oxide  400 mg Oral BID     melatonin  10 mg Oral QPM     multivitamins w/minerals  15 mL Per Feeding Tube Daily     mycophenolate  1,000 mg Per J Tube BID     nystatin  1,000,000 Units Swish & Swallow 4x Daily     pantoprazole  40 mg Per Feeding Tube BID     predniSONE  10 mg Per J Tube QPM     predniSONE  12.5 mg Per J Tube Daily     QUEtiapine  25 mg Oral or Feeding Tube BID     QUEtiapine  50 mg Oral At Bedtime     rosuvastatin  10 mg Oral or Feeding Tube Daily     sodium chloride (PF)  10-40 mL Intracatheter Q8H     sodium chloride (PF)  3 mL Intracatheter Q8H     sulfamethoxazole-trimethoprim  1 tablet Oral or Feeding Tube Daily     tacrolimus  2 mg Oral QPM     tacrolimus  2.5 mg Oral QAM     warfarin ANTICOAGULANT  2 mg Oral ONCE at 18:00

## 2021-11-30 NOTE — PROGRESS NOTES
Thoracic surgery brief note     Doing well. Trach site c/d/i.  Peg-j site c/d/i. Trach currently capped with ST Altagracia Allan MD  Surgery Resident PGY-3  Pg [phone]

## 2021-12-01 ENCOUNTER — APPOINTMENT (OUTPATIENT)
Dept: PHYSICAL THERAPY | Facility: CLINIC | Age: 56
End: 2021-12-01
Attending: STUDENT IN AN ORGANIZED HEALTH CARE EDUCATION/TRAINING PROGRAM
Payer: COMMERCIAL

## 2021-12-01 ENCOUNTER — APPOINTMENT (OUTPATIENT)
Dept: SPEECH THERAPY | Facility: CLINIC | Age: 56
End: 2021-12-01
Attending: STUDENT IN AN ORGANIZED HEALTH CARE EDUCATION/TRAINING PROGRAM
Payer: COMMERCIAL

## 2021-12-01 ENCOUNTER — APPOINTMENT (OUTPATIENT)
Dept: OCCUPATIONAL THERAPY | Facility: CLINIC | Age: 56
End: 2021-12-01
Attending: STUDENT IN AN ORGANIZED HEALTH CARE EDUCATION/TRAINING PROGRAM
Payer: COMMERCIAL

## 2021-12-01 ENCOUNTER — APPOINTMENT (OUTPATIENT)
Dept: GENERAL RADIOLOGY | Facility: CLINIC | Age: 56
End: 2021-12-01
Attending: PHYSICIAN ASSISTANT
Payer: COMMERCIAL

## 2021-12-01 LAB
ALBUMIN SERPL-MCNC: 2.2 G/DL (ref 3.4–5)
ALP SERPL-CCNC: 103 U/L (ref 40–150)
ALT SERPL W P-5'-P-CCNC: 29 U/L (ref 0–70)
ANION GAP SERPL CALCULATED.3IONS-SCNC: 7 MMOL/L (ref 3–14)
AST SERPL W P-5'-P-CCNC: 26 U/L (ref 0–45)
ATRIAL RATE - MUSE: 87 BPM
BACTERIA PLR CULT: ABNORMAL
BASE EXCESS BLDV CALC-SCNC: 6.6 MMOL/L (ref -7.7–1.9)
BILIRUB SERPL-MCNC: 0.2 MG/DL (ref 0.2–1.3)
BUN SERPL-MCNC: 38 MG/DL (ref 7–30)
CALCIUM SERPL-MCNC: 9 MG/DL (ref 8.5–10.1)
CHLORIDE BLD-SCNC: 102 MMOL/L (ref 94–109)
CO2 SERPL-SCNC: 29 MMOL/L (ref 20–32)
CREAT SERPL-MCNC: 0.74 MG/DL (ref 0.66–1.25)
DIASTOLIC BLOOD PRESSURE - MUSE: NORMAL MMHG
DONOR IDENTIFICATION: NORMAL
DQB5: 2522
DSA COMMENTS: NORMAL
DSA PRESENT: YES
DSA TEST METHOD: NORMAL
ERYTHROCYTE [DISTWIDTH] IN BLOOD BY AUTOMATED COUNT: 15.4 % (ref 10–15)
GFR SERPL CREATININE-BSD FRML MDRD: >90 ML/MIN/1.73M2
GLUCOSE BLD-MCNC: 150 MG/DL (ref 70–99)
GLUCOSE BLDC GLUCOMTR-MCNC: 114 MG/DL (ref 70–99)
GLUCOSE BLDC GLUCOMTR-MCNC: 130 MG/DL (ref 70–99)
GLUCOSE BLDC GLUCOMTR-MCNC: 138 MG/DL (ref 70–99)
GLUCOSE BLDC GLUCOMTR-MCNC: 170 MG/DL (ref 70–99)
GLUCOSE BLDC GLUCOMTR-MCNC: 209 MG/DL (ref 70–99)
GLUCOSE BLDC GLUCOMTR-MCNC: 87 MG/DL (ref 70–99)
HCO3 BLDV-SCNC: 32 MMOL/L (ref 21–28)
HCT VFR BLD AUTO: 28.9 % (ref 40–53)
HGB BLD-MCNC: 9 G/DL (ref 13.3–17.7)
INR PPP: 1.74 (ref 0.85–1.15)
INTERPRETATION ECG - MUSE: NORMAL
MAGNESIUM SERPL-MCNC: 1.6 MG/DL (ref 1.6–2.3)
MCH RBC QN AUTO: 30.6 PG (ref 26.5–33)
MCHC RBC AUTO-ENTMCNC: 31.1 G/DL (ref 31.5–36.5)
MCV RBC AUTO: 98 FL (ref 78–100)
O2/TOTAL GAS SETTING VFR VENT: 2 %
ORGAN: NORMAL
P AXIS - MUSE: 35 DEGREES
PATH REPORT.COMMENTS IMP SPEC: NORMAL
PATH REPORT.COMMENTS IMP SPEC: NORMAL
PATH REPORT.FINAL DX SPEC: NORMAL
PATH REPORT.GROSS SPEC: NORMAL
PATH REPORT.MICROSCOPIC SPEC OTHER STN: NORMAL
PATH REPORT.RELEVANT HX SPEC: NORMAL
PCO2 BLDV: 50 MM HG (ref 40–50)
PH BLDV: 7.42 [PH] (ref 7.32–7.43)
PHOSPHATE SERPL-MCNC: 3.3 MG/DL (ref 2.5–4.5)
PLATELET # BLD AUTO: 89 10E3/UL (ref 150–450)
PO2 BLDV: 43 MM HG (ref 25–47)
POTASSIUM BLD-SCNC: 3.9 MMOL/L (ref 3.4–5.3)
PR INTERVAL - MUSE: 150 MS
PROT SERPL-MCNC: 5.5 G/DL (ref 6.8–8.8)
QRS DURATION - MUSE: 78 MS
QT - MUSE: 346 MS
QTC - MUSE: 416 MS
R AXIS - MUSE: 28 DEGREES
RBC # BLD AUTO: 2.94 10E6/UL (ref 4.4–5.9)
SA 1 CELL: NORMAL
SA 1 TEST METHOD: NORMAL
SA 2 CELL: NORMAL
SA 2 TEST METHOD: NORMAL
SA1 HI RISK ABY: NORMAL
SA1 MOD RISK ABY: NORMAL
SA2 HI RISK ABY: NORMAL
SA2 MOD RISK ABY: NORMAL
SCANNED LAB RESULT: NORMAL
SODIUM SERPL-SCNC: 138 MMOL/L (ref 133–144)
SYSTOLIC BLOOD PRESSURE - MUSE: NORMAL MMHG
T AXIS - MUSE: -16 DEGREES
UNACCEPTABLE ANTIGENS: NORMAL
UNOS CPRA: 0
VENTRICULAR RATE- MUSE: 87 BPM
WBC # BLD AUTO: 10 10E3/UL (ref 4–11)
ZZZSA 1  COMMENTS: NORMAL
ZZZSA 2 COMMENTS: NORMAL

## 2021-12-01 PROCEDURE — 250N000013 HC RX MED GY IP 250 OP 250 PS 637: Performed by: STUDENT IN AN ORGANIZED HEALTH CARE EDUCATION/TRAINING PROGRAM

## 2021-12-01 PROCEDURE — 250N000013 HC RX MED GY IP 250 OP 250 PS 637: Performed by: NURSE PRACTITIONER

## 2021-12-01 PROCEDURE — 83735 ASSAY OF MAGNESIUM: CPT | Performed by: THORACIC SURGERY (CARDIOTHORACIC VASCULAR SURGERY)

## 2021-12-01 PROCEDURE — 85014 HEMATOCRIT: CPT | Performed by: THORACIC SURGERY (CARDIOTHORACIC VASCULAR SURGERY)

## 2021-12-01 PROCEDURE — 97116 GAIT TRAINING THERAPY: CPT | Mod: GP

## 2021-12-01 PROCEDURE — 82803 BLOOD GASES ANY COMBINATION: CPT | Performed by: PHYSICIAN ASSISTANT

## 2021-12-01 PROCEDURE — 250N000011 HC RX IP 250 OP 636: Performed by: STUDENT IN AN ORGANIZED HEALTH CARE EDUCATION/TRAINING PROGRAM

## 2021-12-01 PROCEDURE — 250N000012 HC RX MED GY IP 250 OP 636 PS 637: Performed by: INTERNAL MEDICINE

## 2021-12-01 PROCEDURE — 71045 X-RAY EXAM CHEST 1 VIEW: CPT | Mod: 26 | Performed by: STUDENT IN AN ORGANIZED HEALTH CARE EDUCATION/TRAINING PROGRAM

## 2021-12-01 PROCEDURE — 250N000011 HC RX IP 250 OP 636: Performed by: INTERNAL MEDICINE

## 2021-12-01 PROCEDURE — 84100 ASSAY OF PHOSPHORUS: CPT | Performed by: THORACIC SURGERY (CARDIOTHORACIC VASCULAR SURGERY)

## 2021-12-01 PROCEDURE — 250N000009 HC RX 250: Performed by: INTERNAL MEDICINE

## 2021-12-01 PROCEDURE — 250N000013 HC RX MED GY IP 250 OP 250 PS 637: Performed by: ANESTHESIOLOGY

## 2021-12-01 PROCEDURE — 250N000012 HC RX MED GY IP 250 OP 636 PS 637: Performed by: STUDENT IN AN ORGANIZED HEALTH CARE EDUCATION/TRAINING PROGRAM

## 2021-12-01 PROCEDURE — 71045 X-RAY EXAM CHEST 1 VIEW: CPT

## 2021-12-01 PROCEDURE — 250N000013 HC RX MED GY IP 250 OP 250 PS 637: Performed by: PHYSICIAN ASSISTANT

## 2021-12-01 PROCEDURE — 36592 COLLECT BLOOD FROM PICC: CPT | Performed by: PHYSICIAN ASSISTANT

## 2021-12-01 PROCEDURE — 120N000003 HC R&B IMCU UMMC

## 2021-12-01 PROCEDURE — 999N000157 HC STATISTIC RCP TIME EA 10 MIN

## 2021-12-01 PROCEDURE — 97530 THERAPEUTIC ACTIVITIES: CPT | Mod: GP

## 2021-12-01 PROCEDURE — 85610 PROTHROMBIN TIME: CPT | Performed by: THORACIC SURGERY (CARDIOTHORACIC VASCULAR SURGERY)

## 2021-12-01 PROCEDURE — 250N000013 HC RX MED GY IP 250 OP 250 PS 637: Performed by: THORACIC SURGERY (CARDIOTHORACIC VASCULAR SURGERY)

## 2021-12-01 PROCEDURE — 250N000011 HC RX IP 250 OP 636: Performed by: ANESTHESIOLOGY

## 2021-12-01 PROCEDURE — 92507 TX SP LANG VOICE COMM INDIV: CPT | Mod: GN

## 2021-12-01 PROCEDURE — 94640 AIRWAY INHALATION TREATMENT: CPT

## 2021-12-01 PROCEDURE — 250N000012 HC RX MED GY IP 250 OP 636 PS 637: Performed by: PHYSICIAN ASSISTANT

## 2021-12-01 PROCEDURE — 94640 AIRWAY INHALATION TREATMENT: CPT | Mod: 76

## 2021-12-01 PROCEDURE — 80053 COMPREHEN METABOLIC PANEL: CPT | Performed by: THORACIC SURGERY (CARDIOTHORACIC VASCULAR SURGERY)

## 2021-12-01 PROCEDURE — 250N000013 HC RX MED GY IP 250 OP 250 PS 637

## 2021-12-01 PROCEDURE — 99233 SBSQ HOSP IP/OBS HIGH 50: CPT | Performed by: STUDENT IN AN ORGANIZED HEALTH CARE EDUCATION/TRAINING PROGRAM

## 2021-12-01 PROCEDURE — 97535 SELF CARE MNGMENT TRAINING: CPT | Mod: GO | Performed by: OCCUPATIONAL THERAPIST

## 2021-12-01 PROCEDURE — 250N000013 HC RX MED GY IP 250 OP 250 PS 637: Performed by: INTERNAL MEDICINE

## 2021-12-01 PROCEDURE — 250N000011 HC RX IP 250 OP 636: Performed by: PHYSICIAN ASSISTANT

## 2021-12-01 PROCEDURE — 94668 MNPJ CHEST WALL SBSQ: CPT

## 2021-12-01 RX ORDER — PREDNISONE 10 MG/1
10 TABLET ORAL 2 TIMES DAILY
Status: DISCONTINUED | OUTPATIENT
Start: 2021-12-05 | End: 2021-12-13 | Stop reason: HOSPADM

## 2021-12-01 RX ORDER — WARFARIN SODIUM 2 MG/1
2 TABLET ORAL
Status: COMPLETED | OUTPATIENT
Start: 2021-12-01 | End: 2021-12-01

## 2021-12-01 RX ORDER — METHOCARBAMOL 500 MG/1
500 TABLET, FILM COATED ORAL 4 TIMES DAILY
Status: DISCONTINUED | OUTPATIENT
Start: 2021-12-01 | End: 2021-12-02

## 2021-12-01 RX ORDER — MAGNESIUM OXIDE 400 MG/1
400 TABLET ORAL 2 TIMES DAILY
Status: DISCONTINUED | OUTPATIENT
Start: 2021-12-01 | End: 2021-12-08

## 2021-12-01 RX ADMIN — CEFTAZIDIME 2 G: 2 INJECTION, POWDER, FOR SOLUTION INTRAVENOUS at 19:41

## 2021-12-01 RX ADMIN — INSULIN ASPART 2 UNITS: 100 INJECTION, SOLUTION INTRAVENOUS; SUBCUTANEOUS at 09:10

## 2021-12-01 RX ADMIN — NYSTATIN 1000000 UNITS: 500000 SUSPENSION ORAL at 08:50

## 2021-12-01 RX ADMIN — FLUCONAZOLE IN SODIUM CHLORIDE 400 MG: 2 INJECTION, SOLUTION INTRAVENOUS at 09:53

## 2021-12-01 RX ADMIN — QUETIAPINE FUMARATE 25 MG: 25 TABLET ORAL at 08:51

## 2021-12-01 RX ADMIN — NYSTATIN 1000000 UNITS: 500000 SUSPENSION ORAL at 12:27

## 2021-12-01 RX ADMIN — TACROLIMUS 2 MG: 5 CAPSULE ORAL at 17:56

## 2021-12-01 RX ADMIN — HYDROMORPHONE HYDROCHLORIDE 0.3 MG: 1 INJECTION, SOLUTION INTRAMUSCULAR; INTRAVENOUS; SUBCUTANEOUS at 09:53

## 2021-12-01 RX ADMIN — WARFARIN SODIUM 2 MG: 2 TABLET ORAL at 17:54

## 2021-12-01 RX ADMIN — ESCITALOPRAM 5 MG: 5 TABLET, FILM COATED ORAL at 08:47

## 2021-12-01 RX ADMIN — Medication 1 PACKET: at 08:48

## 2021-12-01 RX ADMIN — TACROLIMUS 2.5 MG: 5 CAPSULE ORAL at 08:52

## 2021-12-01 RX ADMIN — PREDNISONE 10 MG: 5 TABLET ORAL at 19:38

## 2021-12-01 RX ADMIN — Medication 5 ML: at 07:15

## 2021-12-01 RX ADMIN — HYDROXYZINE HYDROCHLORIDE 25 MG: 25 TABLET, FILM COATED ORAL at 04:07

## 2021-12-01 RX ADMIN — Medication 40 MG: at 08:50

## 2021-12-01 RX ADMIN — ACETAMINOPHEN 975 MG: 325 TABLET, FILM COATED ORAL at 06:23

## 2021-12-01 RX ADMIN — METHOCARBAMOL 500 MG: 500 TABLET ORAL at 13:53

## 2021-12-01 RX ADMIN — HYDROXYZINE HYDROCHLORIDE 50 MG: 50 TABLET, FILM COATED ORAL at 19:38

## 2021-12-01 RX ADMIN — ACETYLCYSTEINE 2 ML: 200 SOLUTION ORAL; RESPIRATORY (INHALATION) at 09:02

## 2021-12-01 RX ADMIN — CALCIUM CARBONATE 600 MG (1,500 MG)-VITAMIN D3 400 UNIT TABLET 1 TABLET: at 17:54

## 2021-12-01 RX ADMIN — MYCOPHENOLATE MOFETIL 1000 MG: 200 POWDER, FOR SUSPENSION ORAL at 08:50

## 2021-12-01 RX ADMIN — Medication 40 MG: at 19:40

## 2021-12-01 RX ADMIN — ACETYLCYSTEINE 2 ML: 200 SOLUTION ORAL; RESPIRATORY (INHALATION) at 21:16

## 2021-12-01 RX ADMIN — MULTIVIT AND MINERALS-FERROUS GLUCONATE 9 MG IRON/15 ML ORAL LIQUID 15 ML: at 08:49

## 2021-12-01 RX ADMIN — SULFAMETHOXAZOLE AND TRIMETHOPRIM 1 TABLET: 400; 80 TABLET ORAL at 08:51

## 2021-12-01 RX ADMIN — LEVALBUTEROL HYDROCHLORIDE 1.25 MG: 1.25 SOLUTION RESPIRATORY (INHALATION) at 21:16

## 2021-12-01 RX ADMIN — Medication 1 PACKET: at 20:24

## 2021-12-01 RX ADMIN — AMLODIPINE BESYLATE 5 MG: 2.5 TABLET ORAL at 08:46

## 2021-12-01 RX ADMIN — CEFTAZIDIME 2 G: 2 INJECTION, POWDER, FOR SOLUTION INTRAVENOUS at 04:06

## 2021-12-01 RX ADMIN — ROSUVASTATIN CALCIUM 10 MG: 10 TABLET, FILM COATED ORAL at 08:51

## 2021-12-01 RX ADMIN — INSULIN GLARGINE 30 UNITS: 100 INJECTION, SOLUTION SUBCUTANEOUS at 08:48

## 2021-12-01 RX ADMIN — METHOCARBAMOL 500 MG: 500 TABLET ORAL at 16:01

## 2021-12-01 RX ADMIN — QUETIAPINE FUMARATE 25 MG: 25 TABLET ORAL at 13:54

## 2021-12-01 RX ADMIN — METHOCARBAMOL 500 MG: 500 TABLET ORAL at 19:38

## 2021-12-01 RX ADMIN — FUROSEMIDE 40 MG: 10 INJECTION INTRAMUSCULAR; INTRAVENOUS at 08:48

## 2021-12-01 RX ADMIN — LEVALBUTEROL HYDROCHLORIDE 1.25 MG: 1.25 SOLUTION RESPIRATORY (INHALATION) at 09:02

## 2021-12-01 RX ADMIN — MENTHOL 1 PATCH: 205.5 PATCH TOPICAL at 16:02

## 2021-12-01 RX ADMIN — CALCIUM CARBONATE 600 MG (1,500 MG)-VITAMIN D3 400 UNIT TABLET 1 TABLET: at 08:47

## 2021-12-01 RX ADMIN — Medication 400 MG: at 19:39

## 2021-12-01 RX ADMIN — OXYCODONE HYDROCHLORIDE 10 MG: 5 TABLET ORAL at 01:09

## 2021-12-01 RX ADMIN — QUETIAPINE FUMARATE 25 MG: 25 TABLET ORAL at 01:09

## 2021-12-01 RX ADMIN — NYSTATIN 1000000 UNITS: 500000 SUSPENSION ORAL at 19:37

## 2021-12-01 RX ADMIN — INSULIN ASPART 3 UNITS: 100 INJECTION, SOLUTION INTRAVENOUS; SUBCUTANEOUS at 19:44

## 2021-12-01 RX ADMIN — MYCOPHENOLATE MOFETIL 1000 MG: 200 POWDER, FOR SUSPENSION ORAL at 19:40

## 2021-12-01 RX ADMIN — OXYCODONE HYDROCHLORIDE 10 MG: 5 TABLET ORAL at 19:40

## 2021-12-01 RX ADMIN — QUETIAPINE FUMARATE 50 MG: 50 TABLET ORAL at 19:39

## 2021-12-01 RX ADMIN — Medication 1 TABLET: at 08:48

## 2021-12-01 RX ADMIN — Medication 400 MG: at 16:01

## 2021-12-01 RX ADMIN — CEFTAZIDIME 2 G: 2 INJECTION, POWDER, FOR SOLUTION INTRAVENOUS at 12:27

## 2021-12-01 RX ADMIN — LEVOFLOXACIN 750 MG: 5 INJECTION, SOLUTION INTRAVENOUS at 17:55

## 2021-12-01 RX ADMIN — PREDNISONE 12.5 MG: 2.5 TABLET ORAL at 08:50

## 2021-12-01 RX ADMIN — OXYCODONE HYDROCHLORIDE 10 MG: 5 TABLET ORAL at 06:23

## 2021-12-01 RX ADMIN — LEVOTHYROXINE SODIUM 25 MCG: 0.03 TABLET ORAL at 08:49

## 2021-12-01 RX ADMIN — QUETIAPINE FUMARATE 25 MG: 25 TABLET ORAL at 19:39

## 2021-12-01 RX ADMIN — NYSTATIN 1000000 UNITS: 500000 SUSPENSION ORAL at 16:01

## 2021-12-01 RX ADMIN — HYDROMORPHONE HYDROCHLORIDE 0.3 MG: 1 INJECTION, SOLUTION INTRAMUSCULAR; INTRAVENOUS; SUBCUTANEOUS at 04:06

## 2021-12-01 RX ADMIN — INSULIN GLARGINE 30 UNITS: 100 INJECTION, SOLUTION SUBCUTANEOUS at 19:44

## 2021-12-01 RX ADMIN — Medication 10 MG: at 19:38

## 2021-12-01 ASSESSMENT — ACTIVITIES OF DAILY LIVING (ADL)
ADLS_ACUITY_SCORE: 22

## 2021-12-01 NOTE — PLAN OF CARE
Speech Language Therapy Discharge Summary    Reason for therapy discharge:    All goals and outcomes met, no further needs identified.    Progress towards therapy goal(s). See goals on Care Plan in [facility] electronic health record for goal details.  Goals met    Therapy recommendation(s):    No further therapy is recommended.   Recommend pt continue regular textures and thin liquids. Pt should sit fully upright for all PO, wear trach cap or PMSV for PO intake, slow pacing, alternate between consistencies, and take small sips/bites. Recommend pt continue to wear trach cap as tolerated, cuff must be fully deflated when wearing trach cap.  Should pt tolerate trach capping for 24-48 hours with O2 sats >90% and no deep suctioning needs, pt would be appropriate for consideration of decannulation from ST standpoint-- defer timing to pulmonology team.  No further SLP needs identified, SLP to complete order.

## 2021-12-01 NOTE — PROGRESS NOTES
No acute changes overnight. Pt trach was capped all night. TF through [employer] started at 1700 and will run until 0800 this AM. PRN tylenol, atarax, and oxy given for pain. Vital signs stable. Will continue to monitor.

## 2021-12-01 NOTE — PROGRESS NOTES
Pulmonary Medicine  Cystic Fibrosis - Lung Transplant Team  Progress Note  2021       Patient: Edson Thornton  MRN: [id number]  : 1965 (age 56 year old)  Transplant: 10/16/2021 (Lung), POD#46  Admission date: 2021    Assessment & Plan:     Edson Thornton is a 56 year old male with a PMH significant for NSIP/ILD, bronchiectasis, moderate PH, RA, SALTY, chronic HSV infection, hypogammaglobulinemia, steroid-induced diabetes, hypothyroidism, PFO, HTN, HLD, duodenal anomaly, anxiety, and depression.  Admitted on 21 from OSH for acute on chronic respiratory failure 2/2 ILD exacerbation, now s/p BSLT on 10/16/21.  Prolonged vent wean s/p trach and PEG/J tube placement with thoracic surgery 10/29.  Post-op course otherwise complicated by encephalopathy and diffuse weakness, acute to subacute CVA, afib with RVR, BRENNAN, GI bleed, Candidemia/Candida empyema, and anxiety.  Code called  for bradycardia/asystole, progressive hypotensive, found to have significant GI bleed, EGD with adherent clot near PEG tube site that was clipped.  Improved mentation with ongoing improvement in weakness.  Continuous TD since , weaned to RA , and tolerating continuous trach capping since .     Today's recommendations:  - Continue capping trach as tolerated, supplemental oxygen as needed to maintain SpO2 >92%  - Agree with transitioning to PO diuresis if needed  - CXR today as below, repeat ordered 12/3  - Tacrolimus level ordered tomorrow  - Prednisone taper ordered   - CMV and EBV ordered   - Coccidioides Ag (blood) ordered   - ABX duration per transplant ID (will need repeat chest CT around time of transplant ID follow up )  - Continue fluconazole through  per transplant ID, EKG for QTc monitoring ordered   - IgG ordered 12/15  - DSA ordered   - Oral magnesium supplementation ordered  - Agree with discontinuing IV Dilaudid      S/p BSLT for ILD:  Acute hypoxic  respiratory failure, Resolved:   Prolonged vent weaning s/p trach:  Bilateral pleural effusions: Unfortunately had not received vaccination for flu, PNA, or COVID-19 PTA.  Explant pathology with NSIP, no malignancy.  PGD 2-3.  Weaned off paralytic 10/19 (for vent dyssynchrony) and Caroline 10/22.  Initial difficulty weaning sedation given agitation then with neurological findings as below.  Prolonged vent wean s/p trach and PEG/J tube placement with thoracic surgery 10/29.  Code called 11/2 for bradycardia/asystole (required 1 round of CPR, no medications) then progressively hypotensive, GI bleed as below.  Chest CT 11/2 with increased bilateral pleural effusions (moderate left, small right), bibasilar atelectasis with area of hypoenhancing parenchyma in LLL (suspicious for infection), and numerous nondisplaced anterior rib fractures bilaterally.  S/p left chest tube placement in IR 11/3 for pleural effusion/empyema (as below), right deferred given very little effusion on US.  S/p lytics 11/25-11/28 (CXR showing trapped left lung) and right thoracentesis 11/27.  Left chest tube removed 11/30.  Problems with trach cuff leak 11/3 (requiring multiple exchanges), bronch 11/14 with trach in good position with cuff well sealed in trachea and small to moderate secretions mostly in lower lobes.  Chest CT 11/22 with new cavitary lesion in the left lung base with multifocal GGO in bilateral upper lobes, new 1.8 cm fluid collection with surrounding fat stranding in the left axilla, decreased bilateral loculated pleural effusions, and similar small pericardial effusion.  Continuous TD since 11/20, weaned to RA 11/28, and tolerating continuous trach capping since 11/30.   - Right pleural cultures (11/27) NGTD (reviewed flow cytometry with Dr. Sifuentes 12/1, no concerns)  - Nebs: levalbuterol and Mucomyst BID  - Pulmonary toilet with chest physiotherapy BID  - Continue capping trach as tolerated, supplemental oxygen as needed to  maintain SpO2 >92%  - Diuresis per primary team, agree with transition to PO if needed  - CXR today with small bilateral pleural effusions and bibasilar atelectasis, no appreciable PTX (personally reviewed), repeat 12/3 (ordered)  - Defer bronch, revisit PRN     Immunosuppression: s/p induction therapy with basiliximab 10/16 (and high dose IV steroid) and 10/20  - Tacrolimus 2.5 mg qAM / 2 mg qPM (increased 11/30, suspension, via G tube).  Goal level 8-12.  Repeat level 12/2 (ordered).   - MMF 1000 mg BID (11/2, decreased given GI bleed, AZA to be avoided given TPMT)  - Prednisolone 12.5 mg qAM / 10 mg qPM, next taper due 12/5 (ordered)  Date AM dose (mg) PM dose (mg)   11/21/21 12.5 10   12/5/21 10 10   1/2/22 10 7.5   1/30/22 7.5 7.5   2/27/22 7.5 5   3/27/22 5 5   4/24/22 5 2.5      Prophylaxis:   - Bactrim for PJP ppx (held 11/2-11/5 d/t BRENNAN)  - Nystatin for oral candidiasis ppx, 6 month course  - See below for serologies and viral ppx:    Donor Recipient Intervention   CMV status Negative Negative None, CMV monthly (due 12/14, neg 11/16)   EBV status Positive Positive None, EBV monthly (due 12/14, neg 11/16)   HSV status N/A Positive S/p acyclovir POD #1-30 (recent infection history pre-txp)      ID: Concern for possible Strongyloides exposure pre-transplant s/p ivermectin x1 dose (9/17).  Donor and recipient cultures NGTD.  S/p IV ceftazidime/vancomycin for 48h per protocol and additional empiric ceftazidime 10/19-10/23 given recurrent fevers as below.  Cryptococcal Ag negative 10/29.  Histo and Blasto Ag negative 11/22   - Monthly blood Coccidioides Ag x12 months post-transplant per ID (urine and serum negative 11/17), due 12/17 (ordered)     Recurring fevers, Resolved:  Fevers post-op, Tmax 101.7 POD #1.  Febrile with worsening leukocytosis again 10/25, generally persisting.  LP (10/29), xanthochromic with pleocytosis thought to be appropriate given RBC and WBCs, no ABX recommended per transplant ID and  neurology.  S/p empiric meropenem 10/28-11/2.       Klebsiella pneumoniae:  Pseudomonas fluorescens/putida:   LLL cavity: Klebsiella initially noted trach sputum culture 11/10.  Started on ceftriaxone 11/11, transitioned to ertapenem 11/12-11/13, and back to ceftriaxone 11/14.  Bronch culture 11/12 with Klebsiella and Pseudomonas fluorescens/putida (R-meropenem).  Chest CT 11/22 with LLL cavity as above, BAL with Klebsiella pneumoniae.   - ABX: ceftazidime (11/23-12/21) and levofloxacin (11/23-12/7) per transplant ID; s/p Zosyn (11/15-11/23)  - Transplant ID follow up scheduled 12/21 with repeat chest CT prior      Disseminated Candida: Noted on blood cultures 10/20 and 10/22.  BDG fungitell positive (399) on 10/20.  Respiratory cultures with persistent Candida albicans.  TC 10/23 without evidence of endocarditis.  Ophthalmology consult 10/24 with benign dilated fundoscopic exam.  Candida empyema also noted 10/25, chest tubes inadvertently removed by CVTS 10/28, left chest tube replaced by IR 11/3 as above with ongoing Candida on cultures (11/3, 11/18).  BDG fungitell positive (>500) and Aspergillus galactomannan negative 11/22.  - Fluconazole (10/26-12/21) per transplant ID, repeat EKG for QTc monitoring 12/7 (ordered); s/p micafungin 10/22-10/27     HSV: Chronic intermittent active infection pre-transplant with recent HSV infection: crusted lesions throughout left side of jaw, s/p 10 day treatment course of ACV through 10/9.  HSV PCR blood negative 10/17.  S/p ACV ppx as above (started POD #1 instead of POD #8 given HSV history and location).     Hypogammaglobulinemia: IgG previously low at 364 (9/7).  Noted at 265 at time of transplant, s/p IVIG 10/21, repeat IgG (11/17) 198, s/p IVIG (with premedication) 11/18.  - Repeat IgG (12/15, ordered)     Positive cross match:   Positive DSA: Note that he received two doses of rituximab in June, which is likely contributing to cross match result.  DSA had been negative  through 11/15 and newly positive 11/29 with DQB5 and mfi 2522.  - DSA monitoring weekly (12/6, ordered)     SALTY: Noted pre-transplant.  Home CPAP 6-12 cm H2O.  - Evaluate need for BiPAP after decannulation.     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  MRI 10/23 with multifocal subacute infarcts within both cerebral hemispheres and left cerebellum.  DDx include surgery v embolic v infectious.  Heparin drip started 10/23 (intermittently held with GI bleed as below).  Repeat stroke code 10/25 d/t marked decrease in responsiveness with sedation wean, pupil inequality, and absent gag reflex.  CTA head without obvious new pathology, MRI brain (with and without contrast) primarily revealing for infarct, low likelihood of PRES.  Ammonia normal.  VEEG per neuro with severe diffuse encephalopathy.  Ongoing improvement since 10/29, repeat head CT 11/2 (following code) without new acute intracranial abnormalities.  - AC management per primary team as below   - PT/OT      Afib with RVR: Noted 10/18, started on amiodarone drip and converted to SR, transitioned to PO 10/21, decreased 10/29. Metoprolol and amiodarone discontinued 11/20 d/t intermittent bradycardia.  AC per primary team.      Right subclavian DVT: Seen on UE US from 11/4.  Heparin drip resumed 11/5, transitioned to warfarin 11/20 and in the setting of thrombocytopenia.  HIT negative 11/21.      Recurrent GI bleed, Resolved: Hemoglobin dropped to 6.6 10/22, s/p 2 units pRBC.  OG tube with bloody output, EGD 10/23 noted NJ/OG tube trauma with scant oozing.  Progressive hypotension 11/2, hemoglobin 5.8 with bloody G tube output.  Heparin drip held, transitioned to PPI drip, and MTP  activated.  EGD 11/2 with large amount of clotted blood in stomach and area of raised mucosa with small adherent clot near PEG tube site that was clipped, no active bleeding.  Maroon stools 11/3, repeat EGD with extensive old blood in stomach, no active bleeding, small nodular area with prior clips clipped again.  Most recent EGD 11/5 with ulcer noted at PEG tube bumper site, gastritis, and suction marks from G tube. TF noted in G tube drainage bag 11/7, AXR with GJ tube tip projecting over proximal duodenum. GJ tube exchanged 11/9 by GI.      Elevated LFTs, Resolved: Shock liver post-op, now resolved.  Intermittent alk phos elevation, recently normal.     Hypomagnesemia: Suppressed reabsorption 2/2 CNI.  Requiring intermittent IV and PO replacement.  - Mag-Ox 400 mg BID (ordered)    We appreciate the excellent care provided by the [place] [id number] team.  Recommendations communicated via telephone and this note.  Will continue to follow along closely, please do not hesitate to call with any questions or concerns.    Patient discussed with Dr. Sifuentes.    Keyla Rice PA-C  Inpatient PRINCESS  Pulmonary CF/Transplant     Subjective & Interval History:     Tolerating trach capping since yesterday afternoon.  Placed on 2L NC overnight for questionable drop in SpO2, did not feel dyspneic at the time.  Endorses chest tightness at times and some bilateral incisional pain, receiving PO oxycodone and IV Dilaudid.  Cough productive, unsure of color, received chest physiotherapy BID.      Review of Systems:     C: No fever, no chills, no change in weight, + decreased appetite  INTEGUMENTARY/SKIN: No rash or obvious new lesions  ENT/MOUTH: No sore throat, no sinus pain, no nasal congestion or drainage  RESP: See interval history  CV: No peripheral edema  GI: No nausea, no vomiting, no change in stools, no reflux symptoms  : No dysuria  MUSCULOSKELETAL: No myalgias, no arthralgias  ENDOCRINE: + blood sugars intermittently  "elevated  NEURO: No headache, no numbness or tingling  PSYCHIATRIC: Mood stable    Physical Exam:     Vital signs:  Temp: 97.6  F (36.4  C) Temp src: Axillary BP: 121/73 Pulse: 88   Resp: 13 SpO2: 97 % O2 Device: Nasal cannula Oxygen Delivery: 2 LPM Height: 162.6 cm (5' 4.02\") Weight: 66.2 kg (145 lb 15.1 oz)  I/O:     Intake/Output Summary (Last 24 hours) at 12/1/2021 1117  Last data filed at 12/1/2021 0900  Gross per 24 hour   Intake 1463 ml   Output 1875 ml   Net -412 ml     Constitutional: Sitting up in bed, in no apparent distress.   HEENT: Eyes with pink conjunctivae, anicteric.  Oral mucosa moist without lesions.  Trach capped.  PULM: Mildly diminished air flow to bases bilaterally.  No crackles, no rhonchi, no wheezes.  Non-labored breathing on 2L NC.  CV: Normal S1 and S2.  RRR.  No murmur, gallop, or rub.  No peripheral edema.   ABD: NABS, soft, nontender, nondistended.    MSK: Moves all extremities.  + muscle wasting.   NEURO: Alert, conversant, answering questions appropriately.   SKIN: Warm, dry.  No rash on limited exam.   PSYCH: Mood stable, calm.     Lines, Drains, and Devices:  PICC Triple Lumen 11/04/21 Left Basilic Access. PICC okay to use. (Active)   Site Assessment WDL 12/01/21 0800   External Cath Length (cm) 1 cm 11/30/21 1600   Extremity Circumference (cm) 28 cm 11/28/21 1038   Dressing Intervention Chlorhexidine patch 12/01/21 0800   Dressing Change Due 12/05/21 12/01/21 0000   PICC Comment CDI/Statlock 11/28/21 1038   Mosley - Status saline locked 12/01/21 0800   [place]osley - Cap Change Due 11/30/21 11/30/21 1600   Red - Status saline locked 12/01/21 0800   Red - Cap Change Due 11/30/21 11/30/21 1600   White - Status saline locked 12/01/21 0800   White - Cap Change Due 11/30/21 11/30/21 1600   Extravasation? No 12/01/21 0800   Line Necessity Yes, meets criteria 12/01/21 0800   Number of days: 27     Data:     LABS    CMP:   Recent Labs   Lab 12/01/21  0902 12/01/21  0725 12/01/21  0413 " 12/01/21  0113 11/30/21  1659 11/30/21  1609 11/30/21  0748 11/30/21  0431 11/29/21  1703 11/29/21  1535 11/29/21  0532 11/29/21  0504 11/28/21  1207 11/28/21  0604   NA  --  138  --   --   --  137  --  139  --  138  --  137  137   < > 140  140   POTASSIUM  --  3.9  --   --   --  4.5  --  4.0  --  4.4  --  4.3  4.3   < > 4.1  4.1   CHLORIDE  --  102  --   --   --  103  --  103  --  102  --  101  101   < > 104  104   CO2  --  29  --   --   --  30  --  30  --  30  --  32  32   < > 32  32   ANIONGAP  --  7  --   --   --  4  --  6  --  6  --  4  4   < > 4  4   * 150* 130* 138*   < > 122*   < > 133*   < > 206*   < > 137*  137*   < > 169*  169*   BUN  --  38*  --   --   --  45*  --  52*  --  58*  --  60*  60*   < > 60*  60*   CR  --  0.74  --   --   --  0.84  --  0.82  --  0.87  --  0.93  0.93   < > 0.78  0.78   GFRESTIMATED  --  >90  --   --   --  >90  --  >90  --  >90  --  >90  >90   < > >90  >90   ERIK  --  9.0  --   --   --  9.7  --  9.0  --  9.0  --  9.1  9.1   < > 9.1  9.1   MAG  --  1.6  --   --   --   --   --  1.7  --   --   --  1.7  --  1.6   PHOS  --  3.3  --   --   --   --   --  3.2  --   --   --  3.7  --  3.1   PROTTOTAL  --  5.5*  --   --   --   --   --  5.4*  --   --   --  5.7*  --  5.8*   ALBUMIN  --  2.2*  --   --   --   --   --  2.2*  --   --   --  2.2*  --  2.1*   BILITOTAL  --  0.2  --   --   --   --   --  0.1*  --   --   --  0.2  --  0.2   ALKPHOS  --  103  --   --   --   --   --  106  --   --   --  108  --  114   AST  --  26  --   --   --   --   --  25  --   --   --  26  --  25   ALT  --  29  --   --   --   --   --  27  --   --   --  25  --  25    < > = values in this interval not displayed.     CBC:   Recent Labs   Lab 12/01/21  0725 11/30/21  0431 11/29/21  0504 11/28/21  0604   WBC 10.0 10.4 12.2* 11.7*   RBC 2.94* 2.92* 3.00* 3.00*   HGB 9.0* 8.9* 9.2* 9.3*   HCT 28.9* 28.3* 29.5* 29.1*   MCV 98 97 98 97   MCH 30.6 30.5 30.7 31.0   MCHC 31.1* 31.4* 31.2* 32.0   RDW 15.4*  15.6* 15.7* 15.9*   PLT 89* 81* 76* 71*       INR:   Recent Labs   Lab 12/01/21  0725 11/30/21  0431 11/29/21  0504 11/28/21  0604   INR 1.74* 1.49* 1.31* 1.43*       Glucose:   Recent Labs   Lab 12/01/21  0902 12/01/21  0725 12/01/21  0413 12/01/21  0113 11/30/21 2013 11/30/21  1659   * 150* 130* 138* 218* 132*       Blood Gas:   Recent Labs   Lab 12/01/21  0725   PHV 7.42   PCO2V 50   PO2V 43   HCO3V 32*   LORI 6.6*   O2PER 2       Culture Data No results for input(s): CULT in the last 168 hours.    Virology Data:   Lab Results   Component Value Date    FLUAH1 Negative 11/22/2021    FLUAH3 Negative 11/22/2021    [id number] Negative 11/22/2021    IFLUB Negative 11/22/2021    RSVA Negative 11/22/2021    RSVB Negative 11/22/2021    PIV1 Negative 11/22/2021    PIV2 Negative 11/22/2021    PIV3 Negative 11/22/2021    HMPV Negative 11/22/2021    HRVS Negative 11/22/2021    ADVBE Negative 11/22/2021    ADVC Negative 11/22/2021    ADVC Negative 11/12/2021    ADVC Negative 10/17/2021       Historical CMV results (last 3 of prior testing):  Lab Results   Component Value Date    CMVQNT Not Detected 11/22/2021    CMVQNT Not Detected 11/16/2021    CMVQNT Not Detected 11/12/2021     No results found for: CMVLOG    Urine Studies    Recent Labs   Lab Test 11/01/21  1336 10/25/21  1507   URINEPH 5.5 5.5   NITRITE Negative Negative   LEUKEST Negative Negative   WBCU 0 2       Most Recent [facility] Pulmonary Function Testing (FVC/FEV1 only)  No results found for: 20002  No results found for: 20003  No results found for: 20015  No results found for: 20016    IMAGING    Recent Results (from the past 48 hour(s))   XR Chest Port 1 View    Narrative    Exam: XR CHEST PORT 1 VIEW, 11/29/2021 1:14 PM    Indication: chest tube  in place    Comparison: 11/27/2021 chest x-ray    Findings:   Portable, semiupright view of the chest. Tracheostomy cannula, left  PICC and left basilar chest tube are stable in position. Midline  trachea. Cardiac  silhouette is within normal limits of size. Lung  volumes are low. Small bilateral pleural effusions with overlying  mixed opacities. No pneumothorax.      Impression    Impression:   1. Stable position of support devices  2. Low lung volumes with small bilateral pleural effusions and  overlying opacities, most suggestive of atelectasis.    I have personally reviewed the examination and initial interpretation  and I agree with the findings.    FITO PERALES MD         SYSTEM ID:  [id number]   XR Chest 2 Views    Narrative    Exam: XR CHEST 2 VW, 11/30/2021 8:51 AM    Indication: chest tube in place    Comparison: 11/29/2021 and 11/27/2021 chest x-ray    Findings:   Upright, AP and lateral views the chest. Tracheostomy cannula, left  PICC and left basilar chest tube are stable in position. Cardiac  silhouette is within normal limits of size. Low lung volumes with  stable, mixed opacities in the lung bases. Small bilateral pleural  effusions. No pneumothorax.      Impression    Impression:   1. Stable position of support devices.  2. Stable, low lung volumes with small bilateral pleural effusions and  mixed opacities in the lung bases, suggestive of atelectasis and/or  mild pulmonary edema.    I have personally reviewed the examination and initial interpretation  and I agree with the findings.    DAVIN ANGUIANO MD         SYSTEM ID:  [id number]   XR Chest Port 1 View    Narrative    Portable chest 11/30/2021 at 1453 hours    COMPARISON: Earlier today 0845 hours    INDICATION: Chest tube removal    FINDINGS: Tracheostomy. Left chest catheter removed. Clamshell  sternotomy from prior bilateral lung transplant. Small left pleural  effusion and trace right pleural effusion with lung base atelectasis  bilaterally.      Impression    IMPRESSION: Trace bilateral pleural effusions. Bilateral lung  transplant. Bibasilar atelectasis. Tracheostomy.    JODI MENDEZ MD         SYSTEM ID:  [id number]   XR Chest Port 1 View     Narrative    Exam: XR CHEST PORT 1 VIEW, 12/1/2021 8:47 AM    Indication: s/p lung transplant    Comparison: Chest x-ray 11/30/2021    Findings:     Frontal view x-ray of the chest. Tracheostomy tube projects over the  midthoracic trachea. Unchanged left upper extremity PICC, clamshell  sternotomy wires, mediastinal clips. No appreciable pneumothorax.  Persistent small bilateral pleural effusions. Unchanged  cardiomediastinal silhouette. Visualized osseous structure and upper  abdomen within normal limits. Bibasilar atelectasis..      Impression    Impression:     1. Postsurgical changes of lung transplant.  2. Small bilateral pleural effusions and bibasilar atelectasis.    SHANDRA CEVALLOS MD         SYSTEM ID:  [id number]

## 2021-12-01 NOTE — PROGRESS NOTES
Calorie Count  Intake recorded for: 11/30  Total Kcals: 0 Total Protein: 0g  Kcals from Hospital Food: 0   Protein: 0g  Kcals from Outside Food (average):0 Protein: 0g  # Meals Ordered from Kitchen: 2 meals  # Meals Recorded: no intake recorded.   # Supplements Recorded: no intake recorded.

## 2021-12-01 NOTE — PROGRESS NOTES
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 Post-op course complicated encephalopathy, and diffuse weakness, acute to subacute by CVA, afib with RVR, BRENNAN, candidemia/candida empyema, and Code Blue due to bradycardia/asystole and hypotension for significant GIB s/p EGD with adherent clot near PEG tube site s/p clips. Transferred from ICU to medicine on 11/23/2021. Weaned off ventilator and chest tubes now removed. Appears ready for discharge to ARU.      ILD and NSIP s/p BSLT on 10/16/2021, POD 38  Acute hypoxic respiratory failure s/p tracheostomy on 10/29  Bilateral pleural effusions   - Transplant pulmonology following, appreciate recs   - IS: s/p basiliximab on 10/16 and 10/20. Continue tacrolimus 2 mg QAM and 2.5 mg QPM (goal 8-12), MMF 1000 mg BID, and prednisone 12.5 mg QAM and 10 mg QPM  - Ppx: Continue bactrim 400-80 mg daily, nystatin QID x6 months  - Monthly Coccidioides no longer suggested by ID  - DSA every 2 weeks   - Levalbuterol and mucomyst QID and pulm toilet and chest PT QID  - PT/OT/SLP   - TD with cuff down and PMSV during the day, capping trial as long as tolerated  - no longer requiring diuresis   - Oxycodone 5-10 mg Q4H   - added robaxin for concern for muscle spasm and icy hot patch   - Off of IV dilaudid 12/1     L lung cavitary lesion   suspect aspiration vs pseudomonal vs K pneumonia induced abscess. CT on 10/25 with LLL nodular opacity. Repeat CT chest on 11/22 with new cavitary lesion in the  left lung base, and with multifocal bilateral upper lobe GGO (some of which are new), new 1.8 cm fluid collection with surrounding fat stranding in the left axilla, decreased bilateral loculated pleural effusions. Indeterminate Quant Gold, but negative tuberculin skin tests on mulitple occurences. S/p BAL on 11/22. ID feels less likely fungal. B D glucan positive but has known candidemia. Cocci ag in urine negatie, serum histo negative, CRAG negative  - Transplant ID following   - ID recommends stopping zosyn (11/15-11/23), start Ceftazdime 2 grams Q8H (until 12/21) and levaquin 750 mg daily  (until 12/07)  - Follow up CT chest in 4 weeks 12/21  - Please include in the patient's discharge instructions, follow up with Dr. Abebe on 12/21/2021 @6:30 PM in a virtual visit.   - No indicated labs outside of post-transplant labs.     Klebsiella pneumoniae and Pseudomonas fluorescens/putida HAP  Klebsiella initially noted trach sputum culture 11/10. Bronch culture 11/12 with Klebsiella and Pseudomonas fluorescens/putida (R-meropenem).     - Abx as above.       Disseminated Candida   + candida BCx 10/20 and 10/22.  BDG fungitell positive (399) on 10/20. Sputum Cx + Candida albicans persistently.  TC 10/23 without evidence of endocarditis. Ophthalmology consult 10/24 with benign dilated fundoscopic exam.  Candida empyema also noted 10/25, chest tubes inadvertently removed by CVTS 10/28, left chest tube replaced by IR 11/3 as above with ongoing Candida on cultures. Repeat pleural fungal cultures and fungal/bacterial blood cultures on 11/18 NGTD. Transplant as noted above following   - L chest tube removed 12/1  - Initially on Micafungin (10/22-10/27) transition to fluconazole 400 mg IV daily (start date 10/26 end date 12/21)  - ID recommends evacuate any remaining R and/or left loculated effusions      Hypogammaglobinemia  s/p IVIG with plan to repeat IgG on 12/15     Encephalopathy, resolved   likely multi-factorial.  Multiple brain imaging with stroke as noted below, but negative for PRESS. Ammonia negative. vEEG with severe diffuse encephalopathy. LP as noted below negative for infection. Neurology previously following.   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 MRI brain on 10/23 with multifocal subacute infarct within both cerebral hemispheres and left cerebellum. Presumed embolic with afib hx.   - Anticoagulation as noted below      # Afib with hx of RVR - [id number] 4 for HTN, CVA, and DM2. First noted on 10/18, started on amiodarone drip, and converted to NSR. Metoprolol and amidoarone discontinued on 11/20 d/t intermittent bradycardia.   - Anticoagulation with warfarin as noted below  - Continue Rosuvastatin 10 mg daily      R subclavian DVT   dx on 11/4. Resumed on heparin drip on 11/5 and transitioned to warfarin in setting of thrombocytopenia. HIT panel negative on 11/21.   - Continue warfarin per pharmacy protocol (held on 11/26 for thora) will restart.       DM2 with steroid induced hyperglycemia   Hemoglobin A1c 6.6 pre-operatively. Endocrine recently consulted for steroids induced hyperglycemia.   - Continue Lantus 30 mg BID  - Novolog High Sliding scale insulin Q4H  - BG Q4H      Hypomagnesemia   Likely 2/2 suppressed reabsorption from CNI.   - Sliding scale replacement protocol       Diarrhea   C. Diff negative on 11/20. Rectal tube in place on 11/22. Possibly due to tube feeds or magnesium supplementation.   - Monitor I&O      Malnutrition   S/p PEGJ  - MVI, folic acid, B6, B12 supplements   - TF per nutrition      ------ Chronic stable medical problems ------     RA- Dx 5/2021 with + CCP ab and RF. Previously treated with rituximab. Rheum consulted early in admission. On steroids as noted above.   SALTY - Uses CPAP at bedtime prior to  admission. Will need to evaluate for BiPAP after decannulation   HTN- Continue PTA amlodipine 5 mg daily. PRN labetalol and hydralazine for goal SBP <140   Anxiety and depression - Continue PTA lexapro 5 mg daily   Hypothyroidism - TSH 3.17 on 11/19/21. Continue PTA levothyroxine 25 mcg daily      ------ Resolved Hospital problems -------     Recurrent GIB - hgb drop on 10/22 s/p 2 unit pRBC transfusion with EGD on 10/23 with NJ/OG tube trauma with scant oozing. Patient then developed progressive hypotension and ultimately CODE BLUE for GIB in setting of anticoagulation with heparin drip, s/p MTP. EGD on 11/2 with large amount of clotted blood in stomach and area of raised mucosa with small adherent clot near PEG tube site that was clipped, no active bleeding.  Maroon stools 11/3, repeat EGD with extensive old blood in stomach, no active bleeding, small nodular area with prior clips clipped again.  Most recent EGD 11/5 with ulcer noted at PEG tube bumper site, gastritis, and suction marks from G tube. TF noted in G tube drainage bag 11/7, AXR with GJ tube tip projecting over proximal duodenum. GJ tube exchanged 11/9 by GI.  - PO PPI BID      Transaminitis - elevated LFTs post-op, thought to be due to shock liver      BRENNAN - post operative BRENNAN, Cr peaked at 2.05, with renal function back at baseline with Cr at 0.7     Recurring fevers - Fevers post-op, Tmax 101.7 POD #1.  Febrile with worsening leukocytosis again 10/25, generally persisting.  LP (10/29), xanthochromic with pleocytosis thought to be appropriate given RBC and WBCs, no ABX recommended per transplant ID and neurology.  S/p empiric meropenem 10/28-11/2.  Now afebrile, ABX as above.     HSV-  Chronic intermittent active infection pre-transplant with recent HSV infection: crusted lesions throughout left side of jaw, s/p 10 day treatment course of ACV through 10/9.  HSV PCR blood negative 10/17.  S/p ACV ppx as above (started POD #1 instead of POD #8 given  HSV history and location).     Hypernatremia, resolved  Due to inability to have oral intake. Resolved now that he is PO and able to access water.       Diet: Regular Diet Adult  Adult Formula Drip Feeding: Continuous Pivot 1.5; Jejunostomy; Goal Rate: 80; mL/hr; From: 4:00 PM; 8:00 AM; Medication - Feeding Tube Flush Frequency: At least 15-30 mL water before and after medication administration and with tube clogging; Amou...  Calorie Counts    DVT Prophylaxis: Heparin SQ  Watson Catheter: Not present  Central Lines: None  Code Status: Full Code      Disposition Plan   Expected discharge: medically ready to discharge to TCU recommended to transitional care unit once antibiotic plan established.     The patient's care was discussed with the Patient and Pulm Consultant.    Gerardo Mahan DO  Hospitalist Service  [facility]  Securely message with the Vocera Web Console (learn more here)  Text page via [employer] Paging/Directory    Please see sign in/sign out for up to date coverage information    Clinically Significant Risk Factors Present on Admission                ______________________________________________________________________    Interval History   Patient seen and examined.  No acute overnight events.He notes some increased sharp pain over his incision and his left axillary region. The axillary pain feels a bit like a muscle spasm. It is relieved with IV dilaudid but he is willing to just take oral. Frustrated with his weakness. Breathing doing well, no swelling.     Four point ROS completed and negative unless listed above    Data reviewed today: I reviewed all medications, new labs and imaging results over the last 24 hours. I personally reviewed no images or EKG's today.    Physical Exam   Vital Signs: Temp: 97.5  F (36.4  C) Temp src: Oral BP: 118/76 Pulse: 111   Resp: 20 SpO2: 95 % O2 Device: None (Room air) Oxygen Delivery: 2 LPM  Weight: 145 lbs 15.11 oz  General  Appearance: NAD  Respiratory: CTA b/l  Cardiovascular: RRR S1/S2, no m,r,g  GI: soft, NT, ND, +BS  Skin: no rashes  Other: No edema     Data   Recent Labs   Lab 12/01/21  1229 12/01/21  0902 12/01/21  0725 11/30/21  1659 11/30/21  1609 11/30/21  0748 11/30/21  0431 11/29/21  0532 11/29/21  0504   WBC  --   --  10.0  --   --   --  10.4  --  12.2*   HGB  --   --  9.0*  --   --   --  8.9*  --  9.2*   MCV  --   --  98  --   --   --  97  --  98   PLT  --   --  89*  --   --   --  81*  --  76*   INR  --   --  1.74*  --   --   --  1.49*  --  1.31*   NA  --   --  138  --  137  --  139   < > 137  137   POTASSIUM  --   --  3.9  --  4.5  --  4.0   < > 4.3  4.3   CHLORIDE  --   --  102  --  103  --  103   < > 101  101   CO2  --   --  29  --  30  --  30   < > 32  32   BUN  --   --  38*  --  45*  --  52*   < > 60*  60*   CR  --   --  0.74  --  0.84  --  0.82   < > 0.93  0.93   ANIONGAP  --   --  7  --  4  --  6   < > 4  4   ERIK  --   --  9.0  --  9.7  --  9.0   < > 9.1  9.1   GLC 87 170* 150*   < > 122*   < > 133*   < > 137*  137*   ALBUMIN  --   --  2.2*  --   --   --  2.2*  --  2.2*   PROTTOTAL  --   --  5.5*  --   --   --  5.4*  --  5.7*   BILITOTAL  --   --  0.2  --   --   --  0.1*  --  0.2   ALKPHOS  --   --  103  --   --   --  106  --  108   ALT  --   --  29  --   --   --  27  --  25   AST  --   --  26  --   --   --  25  --  26    < > = values in this interval not displayed.     Recent Results (from the past 24 hour(s))   XR Chest Port 1 View    Narrative    Portable chest 11/30/2021 at 1453 hours    COMPARISON: Earlier today 0845 hours    INDICATION: Chest tube removal    FINDINGS: Tracheostomy. Left chest catheter removed. Clamshell  sternotomy from prior bilateral lung transplant. Small left pleural  effusion and trace right pleural effusion with lung base atelectasis  bilaterally.      Impression    IMPRESSION: Trace bilateral pleural effusions. Bilateral lung  transplant. Bibasilar atelectasis.  Tracheostomy.    JODI MENDEZ MD         SYSTEM ID:  [id number]   XR Chest Port 1 View    Narrative    Exam: XR CHEST PORT 1 VIEW, 12/1/2021 8:47 AM    Indication: s/p lung transplant    Comparison: Chest x-ray 11/30/2021    Findings:     Frontal view x-ray of the chest. Tracheostomy tube projects over the  midthoracic trachea. Unchanged left upper extremity PICC, clamshell  sternotomy wires, mediastinal clips. No appreciable pneumothorax.  Persistent small bilateral pleural effusions. Unchanged  cardiomediastinal silhouette. Visualized osseous structure and upper  abdomen within normal limits. Bibasilar atelectasis..      Impression    Impression:     1. Postsurgical changes of lung transplant.  2. Small bilateral pleural effusions and bibasilar atelectasis.    SHANDRA CEVALLOS MD         SYSTEM ID:  [id number]     Medications     dextrose Stopped (10/24/21 1008)     Warfarin Therapy Reminder         acetylcysteine  2 mL Nebulization BID     amLODIPine  5 mg Per G Tube Daily     calcium carbonate 600 mg-vitamin D 400 units  1 tablet Oral or Feeding Tube BID w/meals     cefTAZidime  2 g Intravenous Q8H     escitalopram  5 mg Oral or Feeding Tube Daily     fiber modular (NUTRISOURCE FIBER)  1 packet Per Feeding Tube BID     fluconazole  400 mg Intravenous Q24H     folic acid-vit B6-vit B12  1 tablet Per G Tube Daily     heparin lock flush  5-20 mL Intracatheter Q24H     insulin aspart  1-12 Units Subcutaneous Q4H     insulin glargine  30 Units Subcutaneous BID     levalbuterol  1.25 mg Nebulization BID     levofloxacin  750 mg Intravenous Q24H     levothyroxine  25 mcg Oral Daily     lidocaine  2 patch Transdermal Q24H     lidocaine   Transdermal Q8H     magnesium oxide  400 mg Oral BID     melatonin  10 mg Oral QPM     menthol   Transdermal Q8H     methocarbamol  500 mg Oral 4x Daily     multivitamins w/minerals  15 mL Per Feeding Tube Daily     mycophenolate  1,000 mg Per J Tube BID     nystatin  1,000,000 Units  Swish & Swallow 4x Daily     pantoprazole  40 mg Per Feeding Tube BID     [START ON 12/5/2021] predniSONE  10 mg Oral or Feeding Tube BID     predniSONE  10 mg Per J Tube QPM     predniSONE  12.5 mg Per J Tube Daily     QUEtiapine  25 mg Oral or Feeding Tube BID     QUEtiapine  50 mg Oral At Bedtime     rosuvastatin  10 mg Oral or Feeding Tube Daily     sodium chloride (PF)  10-40 mL Intracatheter Q8H     sodium chloride (PF)  3 mL Intracatheter Q8H     sulfamethoxazole-trimethoprim  1 tablet Oral or Feeding Tube Daily     tacrolimus  2 mg Oral QPM     tacrolimus  2.5 mg Oral QAM     warfarin ANTICOAGULANT  2 mg Oral ONCE at 18:00

## 2021-12-01 NOTE — PROGRESS NOTES
Cardiovascular Surgery Progress Note  12/01/2021           Assessment and Plan:   Patient is a 57 y/o M w Hx of ILD and rheumatoid lung disease, RA, SALTY, hypothyroid, HTN, anxiety and depression, HLD, 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 Corral. Prolonged vent wean s/p trach and PEG/J tube placement with thoracic surgery 10/29.  Post-op course otherwise complicated by encephalopathy and diffuse weakness, acute to subacute CVA, afib with RVR, BRENNAN, GI bleed, Candidemia/Candida empyema, and anxiety.  Code called 11/2 for bradycardia/asystole, progressive hypotensive, found to have significant GI bleed, EGD with adherent clot near PEG tube site that was clipped.  Tolerating TD trials since 11/20 and ongoing improvement in mentation and weakness.  Patient has continued to improve and transferred to the floor 11/23.  Medicine team primary, CVTS following for incision and chest tube management.     - Currently being treated with ceftazidime, levaquin and fluconazole per transplant ID.   - All surgical drains have been removed  - CT chest 11/22 showed new cavitary lesion in the left lung base, and with multifocal bilateral upper lobe GGO (some of which are new), new 1.8 cm fluid collection with surrounding fat stranding in the left axilla, decreased bilateral loculated pleural effusions.  - Left pigtail placed 11/3 for empyema/pleural effusion  - Discussed with pulmonary, okay to remove pigtail 11/30.   - Removed chest tube pigtail 11/30 (had only 50 ml/24hrs out) without complication.   - clamshell incision WDI, healing nicely   - Continue Sternal precaution for full 12 weeks post-op.   - Follow up with CVTS as needed.   - Other cares per medicine and pulmonary team    CVTS will sign off, thank you.    D/w Dr. Shayna Colón, DNP, CNP  [facility] Cardiothoracic Surgery  O: [phone]  Pgr [phone]          Interval History:   No acute events overnight. Slept good. No agitation or  delirium. States pain is well managed on current regimen. No acute complaints.          Medications:       acetylcysteine  2 mL Nebulization BID     amLODIPine  5 mg Per G Tube Daily     calcium carbonate 600 mg-vitamin D 400 units  1 tablet Oral or Feeding Tube BID w/meals     cefTAZidime  2 g Intravenous Q8H     escitalopram  5 mg Oral or Feeding Tube Daily     fiber modular (NUTRISOURCE FIBER)  1 packet Per Feeding Tube BID     fluconazole  400 mg Intravenous Q24H     folic acid-vit B6-vit B12  1 tablet Per G Tube Daily     furosemide  40 mg Intravenous BID     heparin lock flush  5-20 mL Intracatheter Q24H     insulin aspart  1-12 Units Subcutaneous Q4H     insulin glargine  30 Units Subcutaneous BID     levalbuterol  1.25 mg Nebulization BID     levofloxacin  750 mg Intravenous Q24H     levothyroxine  25 mcg Oral Daily     lidocaine  2 patch Transdermal Q24H     lidocaine   Transdermal Q8H     melatonin  10 mg Oral QPM     multivitamins w/minerals  15 mL Per Feeding Tube Daily     mycophenolate  1,000 mg Per J Tube BID     nystatin  1,000,000 Units Swish & Swallow 4x Daily     pantoprazole  40 mg Per Feeding Tube BID     predniSONE  10 mg Per J Tube QPM     predniSONE  12.5 mg Per J Tube Daily     QUEtiapine  25 mg Oral or Feeding Tube BID     QUEtiapine  50 mg Oral At Bedtime     rosuvastatin  10 mg Oral or Feeding Tube Daily     sodium chloride (PF)  10-40 mL Intracatheter Q8H     sodium chloride (PF)  3 mL Intracatheter Q8H     sulfamethoxazole-trimethoprim  1 tablet Oral or Feeding Tube Daily     tacrolimus  2 mg Oral QPM     tacrolimus  2.5 mg Oral QAM     acetaminophen, albuterol, bisacodyl, dextrose, glucose **OR** dextrose **OR** glucagon, heparin lock flush, hydrALAZINE, HYDROmorphone, hydrOXYzine **OR** hydrOXYzine, labetalol, lidocaine (viscous), magnesium hydroxide, naloxone **OR** naloxone **OR** naloxone **OR** naloxone, ondansetron **OR** ondansetron, oxyCODONE, oxymetazoline, prochlorperazine  "**OR** prochlorperazine, QUEtiapine, senna-docusate, sodium chloride (PF), sodium chloride (PF), Warfarin Therapy Reminder          Physical Exam:   Vitals were reviewed  Blood pressure 121/73, pulse 76, temperature 97.6  F (36.4  C), temperature source Axillary, resp. rate 10, height 1.626 m (5' 4.02\"), weight 66.2 kg (145 lb 15.1 oz), SpO2 98 %.  Vitals:    11/27/21 0400 11/28/21 0628 11/29/21 1053   Weight: 66 kg (145 lb 8.1 oz) 66.1 kg (145 lb 11.6 oz) 66.2 kg (145 lb 15.1 oz)      I/O last 3 completed shifts:  In: 2693 [P.O.:200; I.V.:33; NG/GT:460]  Out: 2000 [Urine:1875; Emesis/NG output:75; Chest Tube:50]    Gen: A&Ox4, NAD. .   Pulm: Has trach which is capped, on O2 per nc speaking well around trach. Non-labored breathing.  CV: RRR,  No peripheral edema.   Clamshell incision healed.   Tubes/drains: Chest dressing clean and dry.          Data:    All labs and imaging reviewed by me.  Labs:    Data   BMP  Recent Labs   Lab 12/01/21  0725 12/01/21  0413 12/01/21  0113 11/30/21 2013 11/30/21  1659 11/30/21  1609 11/30/21  0748 11/30/21  0431 11/29/21  1703 11/29/21  1535     --   --   --   --  137  --  139  --  138   POTASSIUM 3.9  --   --   --   --  4.5  --  4.0  --  4.4   CHLORIDE 102  --   --   --   --  103  --  103  --  102   ERIK 9.0  --   --   --   --  9.7  --  9.0  --  9.0   CO2 29  --   --   --   --  30  --  30  --  30   BUN 38*  --   --   --   --  45*  --  52*  --  58*   CR 0.74  --   --   --   --  0.84  --  0.82  --  0.87   * 130* 138* 218*   < > 122*   < > 133*   < > 206*    < > = values in this interval not displayed.     CBC  Recent Labs   Lab 12/01/21 0725 11/30/21  0431 11/29/21  0504 11/28/21  0604   WBC 10.0 10.4 12.2* 11.7*   RBC 2.94* 2.92* 3.00* 3.00*   HGB 9.0* 8.9* 9.2* 9.3*   HCT 28.9* 28.3* 29.5* 29.1*   MCV 98 97 98 97   MCH 30.6 30.5 30.7 31.0   MCHC 31.1* 31.4* 31.2* 32.0   RDW 15.4* 15.6* 15.7* 15.9*   PLT 89* 81* 76* 71*     INR  Recent Labs   Lab 12/01/21 0725 " 11/30/21  0431 11/29/21  0504 11/28/21  0604   INR 1.74* 1.49* 1.31* 1.43*      Hepatic Panel   Recent Labs   Lab 12/01/21  0725 11/30/21  0431 11/29/21  0504 11/28/21  0604   AST 26 25 26 25   ALT 29 27 25 25   ALKPHOS 103 106 108 114   BILITOTAL 0.2 0.1* 0.2 0.2   ALBUMIN 2.2* 2.2* 2.2* 2.1*     Glucose  Recent Labs   Lab 12/01/21  0725 12/01/21  0413 12/01/21  0113 11/30/21 2013 11/30/21  1659 11/30/21  1609   * 130* 138* 218* 132* 122*       Imaging:  Recent Results (from the past 24 hour(s))   XR Chest Port 1 View    Narrative    Portable chest 11/30/2021 at 1453 hours    COMPARISON: Earlier today 0845 hours    INDICATION: Chest tube removal    FINDINGS: Tracheostomy. Left chest catheter removed. Clamshell  sternotomy from prior bilateral lung transplant. Small left pleural  effusion and trace right pleural effusion with lung base atelectasis  bilaterally.      Impression    IMPRESSION: Trace bilateral pleural effusions. Bilateral lung  transplant. Bibasilar atelectasis. Tracheostomy.    JODI MENDEZ MD         SYSTEM ID:  [id number]   XR Chest Port 1 View    Narrative    Exam: XR CHEST PORT 1 VIEW, 12/1/2021 8:47 AM    Indication: s/p lung transplant    Comparison: Chest x-ray 11/30/2021    Findings:     Frontal view x-ray of the chest. Tracheostomy tube projects over the  midthoracic trachea. Unchanged left upper extremity PICC, clamshell  sternotomy wires, mediastinal clips. No appreciable pneumothorax.  Persistent small bilateral pleural effusions. Unchanged  cardiomediastinal silhouette. Visualized osseous structure and upper  abdomen within normal limits. Bibasilar atelectasis..      Impression    Impression:     1. Postsurgical changes of lung transplant.  2. Small bilateral pleural effusions and bibasilar atelectasis.    SHANDRA CEVALLOS MD         SYSTEM ID:  [id number]

## 2021-12-02 ENCOUNTER — APPOINTMENT (OUTPATIENT)
Dept: OCCUPATIONAL THERAPY | Facility: CLINIC | Age: 56
End: 2021-12-02
Attending: STUDENT IN AN ORGANIZED HEALTH CARE EDUCATION/TRAINING PROGRAM
Payer: COMMERCIAL

## 2021-12-02 ENCOUNTER — APPOINTMENT (OUTPATIENT)
Dept: PHYSICAL THERAPY | Facility: CLINIC | Age: 56
End: 2021-12-02
Attending: STUDENT IN AN ORGANIZED HEALTH CARE EDUCATION/TRAINING PROGRAM
Payer: COMMERCIAL

## 2021-12-02 LAB
ALBUMIN SERPL-MCNC: 2.1 G/DL (ref 3.4–5)
ALP SERPL-CCNC: 106 U/L (ref 40–150)
ALT SERPL W P-5'-P-CCNC: 28 U/L (ref 0–70)
ANION GAP SERPL CALCULATED.3IONS-SCNC: 7 MMOL/L (ref 3–14)
AST SERPL W P-5'-P-CCNC: 24 U/L (ref 0–45)
BACTERIA BLD CULT: NO GROWTH
BACTERIA PLR CULT: NO GROWTH
BILIRUB SERPL-MCNC: 0.3 MG/DL (ref 0.2–1.3)
BUN SERPL-MCNC: 38 MG/DL (ref 7–30)
CALCIUM SERPL-MCNC: 9 MG/DL (ref 8.5–10.1)
CHLORIDE BLD-SCNC: 104 MMOL/L (ref 94–109)
CO2 SERPL-SCNC: 29 MMOL/L (ref 20–32)
CREAT SERPL-MCNC: 0.71 MG/DL (ref 0.66–1.25)
ERYTHROCYTE [DISTWIDTH] IN BLOOD BY AUTOMATED COUNT: 15.3 % (ref 10–15)
GFR SERPL CREATININE-BSD FRML MDRD: >90 ML/MIN/1.73M2
GLUCOSE BLD-MCNC: 126 MG/DL (ref 70–99)
GLUCOSE BLDC GLUCOMTR-MCNC: 123 MG/DL (ref 70–99)
GLUCOSE BLDC GLUCOMTR-MCNC: 125 MG/DL (ref 70–99)
GLUCOSE BLDC GLUCOMTR-MCNC: 151 MG/DL (ref 70–99)
GLUCOSE BLDC GLUCOMTR-MCNC: 155 MG/DL (ref 70–99)
GLUCOSE BLDC GLUCOMTR-MCNC: 194 MG/DL (ref 70–99)
GLUCOSE BLDC GLUCOMTR-MCNC: 99 MG/DL (ref 70–99)
HCT VFR BLD AUTO: 28.2 % (ref 40–53)
HGB BLD-MCNC: 8.7 G/DL (ref 13.3–17.7)
INR PPP: 2.15 (ref 0.85–1.15)
LACTATE SERPL-SCNC: 1.7 MMOL/L (ref 0.7–2)
MAGNESIUM SERPL-MCNC: 1.6 MG/DL (ref 1.6–2.3)
MCH RBC QN AUTO: 30.4 PG (ref 26.5–33)
MCHC RBC AUTO-ENTMCNC: 30.9 G/DL (ref 31.5–36.5)
MCV RBC AUTO: 99 FL (ref 78–100)
PHOSPHATE SERPL-MCNC: 3.2 MG/DL (ref 2.5–4.5)
PLATELET # BLD AUTO: 90 10E3/UL (ref 150–450)
POTASSIUM BLD-SCNC: 4.1 MMOL/L (ref 3.4–5.3)
PROT SERPL-MCNC: 5.6 G/DL (ref 6.8–8.8)
RBC # BLD AUTO: 2.86 10E6/UL (ref 4.4–5.9)
SODIUM SERPL-SCNC: 140 MMOL/L (ref 133–144)
TACROLIMUS BLD-MCNC: 9.1 UG/L (ref 5–15)
TME LAST DOSE: NORMAL H
TME LAST DOSE: NORMAL H
WBC # BLD AUTO: 9.3 10E3/UL (ref 4–11)

## 2021-12-02 PROCEDURE — 80053 COMPREHEN METABOLIC PANEL: CPT | Performed by: THORACIC SURGERY (CARDIOTHORACIC VASCULAR SURGERY)

## 2021-12-02 PROCEDURE — 94640 AIRWAY INHALATION TREATMENT: CPT

## 2021-12-02 PROCEDURE — 85027 COMPLETE CBC AUTOMATED: CPT | Performed by: THORACIC SURGERY (CARDIOTHORACIC VASCULAR SURGERY)

## 2021-12-02 PROCEDURE — 999N000157 HC STATISTIC RCP TIME EA 10 MIN

## 2021-12-02 PROCEDURE — 250N000011 HC RX IP 250 OP 636: Performed by: STUDENT IN AN ORGANIZED HEALTH CARE EDUCATION/TRAINING PROGRAM

## 2021-12-02 PROCEDURE — 250N000013 HC RX MED GY IP 250 OP 250 PS 637: Performed by: NURSE PRACTITIONER

## 2021-12-02 PROCEDURE — 120N000003 HC R&B IMCU UMMC

## 2021-12-02 PROCEDURE — 250N000013 HC RX MED GY IP 250 OP 250 PS 637: Performed by: STUDENT IN AN ORGANIZED HEALTH CARE EDUCATION/TRAINING PROGRAM

## 2021-12-02 PROCEDURE — 250N000013 HC RX MED GY IP 250 OP 250 PS 637: Performed by: ANESTHESIOLOGY

## 2021-12-02 PROCEDURE — 250N000013 HC RX MED GY IP 250 OP 250 PS 637: Performed by: PHYSICIAN ASSISTANT

## 2021-12-02 PROCEDURE — 97535 SELF CARE MNGMENT TRAINING: CPT | Mod: GO

## 2021-12-02 PROCEDURE — 250N000012 HC RX MED GY IP 250 OP 636 PS 637: Performed by: INTERNAL MEDICINE

## 2021-12-02 PROCEDURE — 99233 SBSQ HOSP IP/OBS HIGH 50: CPT | Performed by: STUDENT IN AN ORGANIZED HEALTH CARE EDUCATION/TRAINING PROGRAM

## 2021-12-02 PROCEDURE — 250N000013 HC RX MED GY IP 250 OP 250 PS 637: Performed by: INTERNAL MEDICINE

## 2021-12-02 PROCEDURE — 97530 THERAPEUTIC ACTIVITIES: CPT | Mod: GP

## 2021-12-02 PROCEDURE — 250N000009 HC RX 250: Performed by: INTERNAL MEDICINE

## 2021-12-02 PROCEDURE — 97116 GAIT TRAINING THERAPY: CPT | Mod: GP

## 2021-12-02 PROCEDURE — 94668 MNPJ CHEST WALL SBSQ: CPT

## 2021-12-02 PROCEDURE — 250N000012 HC RX MED GY IP 250 OP 636 PS 637: Performed by: PHYSICIAN ASSISTANT

## 2021-12-02 PROCEDURE — 36592 COLLECT BLOOD FROM PICC: CPT | Performed by: STUDENT IN AN ORGANIZED HEALTH CARE EDUCATION/TRAINING PROGRAM

## 2021-12-02 PROCEDURE — 97110 THERAPEUTIC EXERCISES: CPT | Mod: GO

## 2021-12-02 PROCEDURE — 250N000013 HC RX MED GY IP 250 OP 250 PS 637

## 2021-12-02 PROCEDURE — 94640 AIRWAY INHALATION TREATMENT: CPT | Mod: 76

## 2021-12-02 PROCEDURE — 80197 ASSAY OF TACROLIMUS: CPT | Performed by: PHYSICIAN ASSISTANT

## 2021-12-02 PROCEDURE — 36592 COLLECT BLOOD FROM PICC: CPT | Performed by: THORACIC SURGERY (CARDIOTHORACIC VASCULAR SURGERY)

## 2021-12-02 PROCEDURE — 83735 ASSAY OF MAGNESIUM: CPT | Performed by: THORACIC SURGERY (CARDIOTHORACIC VASCULAR SURGERY)

## 2021-12-02 PROCEDURE — 99233 SBSQ HOSP IP/OBS HIGH 50: CPT | Mod: 24 | Performed by: PHYSICIAN ASSISTANT

## 2021-12-02 PROCEDURE — 85610 PROTHROMBIN TIME: CPT | Performed by: THORACIC SURGERY (CARDIOTHORACIC VASCULAR SURGERY)

## 2021-12-02 PROCEDURE — 84100 ASSAY OF PHOSPHORUS: CPT | Performed by: THORACIC SURGERY (CARDIOTHORACIC VASCULAR SURGERY)

## 2021-12-02 PROCEDURE — 83605 ASSAY OF LACTIC ACID: CPT | Performed by: STUDENT IN AN ORGANIZED HEALTH CARE EDUCATION/TRAINING PROGRAM

## 2021-12-02 PROCEDURE — 250N000011 HC RX IP 250 OP 636: Performed by: HOSPITALIST

## 2021-12-02 PROCEDURE — 250N000013 HC RX MED GY IP 250 OP 250 PS 637: Performed by: THORACIC SURGERY (CARDIOTHORACIC VASCULAR SURGERY)

## 2021-12-02 RX ORDER — HYDROMORPHONE HYDROCHLORIDE 1 MG/ML
.3-.5 INJECTION, SOLUTION INTRAMUSCULAR; INTRAVENOUS; SUBCUTANEOUS EVERY 6 HOURS PRN
Status: DISCONTINUED | OUTPATIENT
Start: 2021-12-02 | End: 2021-12-02

## 2021-12-02 RX ORDER — WARFARIN SODIUM 1 MG/1
1 TABLET ORAL
Status: COMPLETED | OUTPATIENT
Start: 2021-12-02 | End: 2021-12-02

## 2021-12-02 RX ORDER — ACETAMINOPHEN 325 MG/1
975 TABLET ORAL EVERY 8 HOURS
Status: DISCONTINUED | OUTPATIENT
Start: 2021-12-02 | End: 2021-12-13 | Stop reason: HOSPADM

## 2021-12-02 RX ORDER — CYCLOBENZAPRINE HCL 5 MG
10 TABLET ORAL AT BEDTIME
Status: DISCONTINUED | OUTPATIENT
Start: 2021-12-02 | End: 2021-12-13 | Stop reason: HOSPADM

## 2021-12-02 RX ADMIN — HYDROXYZINE HYDROCHLORIDE 25 MG: 25 TABLET, FILM COATED ORAL at 05:14

## 2021-12-02 RX ADMIN — OXYCODONE HYDROCHLORIDE 10 MG: 5 TABLET ORAL at 05:14

## 2021-12-02 RX ADMIN — NYSTATIN 1000000 UNITS: 500000 SUSPENSION ORAL at 16:15

## 2021-12-02 RX ADMIN — PREDNISONE 10 MG: 5 TABLET ORAL at 20:49

## 2021-12-02 RX ADMIN — CALCIUM CARBONATE 600 MG (1,500 MG)-VITAMIN D3 400 UNIT TABLET 1 TABLET: at 18:36

## 2021-12-02 RX ADMIN — QUETIAPINE FUMARATE 25 MG: 25 TABLET ORAL at 08:32

## 2021-12-02 RX ADMIN — ACETAMINOPHEN 975 MG: 325 TABLET, FILM COATED ORAL at 13:01

## 2021-12-02 RX ADMIN — ACETYLCYSTEINE 2 ML: 200 SOLUTION ORAL; RESPIRATORY (INHALATION) at 10:42

## 2021-12-02 RX ADMIN — CEFTAZIDIME 2 G: 2 INJECTION, POWDER, FOR SOLUTION INTRAVENOUS at 20:45

## 2021-12-02 RX ADMIN — NYSTATIN 1000000 UNITS: 500000 SUSPENSION ORAL at 08:32

## 2021-12-02 RX ADMIN — FLUCONAZOLE IN SODIUM CHLORIDE 400 MG: 2 INJECTION, SOLUTION INTRAVENOUS at 11:10

## 2021-12-02 RX ADMIN — Medication 1 PACKET: at 20:52

## 2021-12-02 RX ADMIN — CYCLOBENZAPRINE HYDROCHLORIDE 10 MG: 5 TABLET, FILM COATED ORAL at 22:20

## 2021-12-02 RX ADMIN — MYCOPHENOLATE MOFETIL 1000 MG: 200 POWDER, FOR SUSPENSION ORAL at 08:31

## 2021-12-02 RX ADMIN — QUETIAPINE FUMARATE 50 MG: 50 TABLET ORAL at 20:48

## 2021-12-02 RX ADMIN — OXYCODONE HYDROCHLORIDE 10 MG: 5 TABLET ORAL at 11:11

## 2021-12-02 RX ADMIN — QUETIAPINE FUMARATE 25 MG: 25 TABLET ORAL at 16:15

## 2021-12-02 RX ADMIN — CEFTAZIDIME 2 G: 2 INJECTION, POWDER, FOR SOLUTION INTRAVENOUS at 13:02

## 2021-12-02 RX ADMIN — MULTIVIT AND MINERALS-FERROUS GLUCONATE 9 MG IRON/15 ML ORAL LIQUID 15 ML: at 08:31

## 2021-12-02 RX ADMIN — HYDROMORPHONE HYDROCHLORIDE 0.5 MG: 1 INJECTION, SOLUTION INTRAMUSCULAR; INTRAVENOUS; SUBCUTANEOUS at 00:49

## 2021-12-02 RX ADMIN — ACETAMINOPHEN 975 MG: 325 TABLET, FILM COATED ORAL at 05:14

## 2021-12-02 RX ADMIN — SULFAMETHOXAZOLE AND TRIMETHOPRIM 1 TABLET: 400; 80 TABLET ORAL at 08:33

## 2021-12-02 RX ADMIN — ACETYLCYSTEINE 2 ML: 200 SOLUTION ORAL; RESPIRATORY (INHALATION) at 21:33

## 2021-12-02 RX ADMIN — Medication 40 MG: at 20:47

## 2021-12-02 RX ADMIN — NYSTATIN 1000000 UNITS: 500000 SUSPENSION ORAL at 13:02

## 2021-12-02 RX ADMIN — NYSTATIN 1000000 UNITS: 500000 SUSPENSION ORAL at 20:49

## 2021-12-02 RX ADMIN — QUETIAPINE FUMARATE 25 MG: 25 TABLET ORAL at 00:50

## 2021-12-02 RX ADMIN — INSULIN GLARGINE 30 UNITS: 100 INJECTION, SOLUTION SUBCUTANEOUS at 20:53

## 2021-12-02 RX ADMIN — LEVOFLOXACIN 750 MG: 5 INJECTION, SOLUTION INTRAVENOUS at 16:28

## 2021-12-02 RX ADMIN — Medication 5 ML: at 06:15

## 2021-12-02 RX ADMIN — Medication 10 MG: at 20:50

## 2021-12-02 RX ADMIN — PREDNISONE 12.5 MG: 2.5 TABLET ORAL at 08:32

## 2021-12-02 RX ADMIN — ESCITALOPRAM 5 MG: 5 TABLET, FILM COATED ORAL at 08:29

## 2021-12-02 RX ADMIN — Medication 400 MG: at 20:49

## 2021-12-02 RX ADMIN — LEVOTHYROXINE SODIUM 25 MCG: 0.03 TABLET ORAL at 08:30

## 2021-12-02 RX ADMIN — ACETAMINOPHEN 975 MG: 325 TABLET, FILM COATED ORAL at 22:20

## 2021-12-02 RX ADMIN — Medication 1 TABLET: at 08:30

## 2021-12-02 RX ADMIN — MYCOPHENOLATE MOFETIL 1000 MG: 200 POWDER, FOR SUSPENSION ORAL at 20:55

## 2021-12-02 RX ADMIN — METHOCARBAMOL 500 MG: 500 TABLET ORAL at 13:02

## 2021-12-02 RX ADMIN — SODIUM CHLORIDE, PRESERVATIVE FREE 5 ML: 5 INJECTION INTRAVENOUS at 18:38

## 2021-12-02 RX ADMIN — OXYCODONE HYDROCHLORIDE 10 MG: 5 TABLET ORAL at 18:36

## 2021-12-02 RX ADMIN — INSULIN ASPART 1 UNITS: 100 INJECTION, SOLUTION INTRAVENOUS; SUBCUTANEOUS at 20:52

## 2021-12-02 RX ADMIN — TACROLIMUS 2.5 MG: 5 CAPSULE ORAL at 08:33

## 2021-12-02 RX ADMIN — METHOCARBAMOL 500 MG: 500 TABLET ORAL at 08:31

## 2021-12-02 RX ADMIN — LEVALBUTEROL HYDROCHLORIDE 1.25 MG: 1.25 SOLUTION RESPIRATORY (INHALATION) at 10:42

## 2021-12-02 RX ADMIN — ROSUVASTATIN CALCIUM 10 MG: 10 TABLET, FILM COATED ORAL at 08:33

## 2021-12-02 RX ADMIN — CEFTAZIDIME 2 G: 2 INJECTION, POWDER, FOR SOLUTION INTRAVENOUS at 05:13

## 2021-12-02 RX ADMIN — INSULIN ASPART 3 UNITS: 100 INJECTION, SOLUTION INTRAVENOUS; SUBCUTANEOUS at 02:53

## 2021-12-02 RX ADMIN — CALCIUM CARBONATE 600 MG (1,500 MG)-VITAMIN D3 400 UNIT TABLET 1 TABLET: at 08:29

## 2021-12-02 RX ADMIN — Medication 40 MG: at 11:10

## 2021-12-02 RX ADMIN — LEVALBUTEROL HYDROCHLORIDE 1.25 MG: 1.25 SOLUTION RESPIRATORY (INHALATION) at 21:33

## 2021-12-02 RX ADMIN — TACROLIMUS 2 MG: 5 CAPSULE ORAL at 18:37

## 2021-12-02 RX ADMIN — INSULIN GLARGINE 30 UNITS: 100 INJECTION, SOLUTION SUBCUTANEOUS at 08:30

## 2021-12-02 RX ADMIN — Medication 1 PACKET: at 08:29

## 2021-12-02 RX ADMIN — Medication 400 MG: at 13:02

## 2021-12-02 RX ADMIN — AMLODIPINE BESYLATE 5 MG: 2.5 TABLET ORAL at 09:00

## 2021-12-02 RX ADMIN — OXYCODONE HYDROCHLORIDE 10 MG: 5 TABLET ORAL at 00:49

## 2021-12-02 RX ADMIN — INSULIN ASPART 1 UNITS: 100 INJECTION, SOLUTION INTRAVENOUS; SUBCUTANEOUS at 08:43

## 2021-12-02 RX ADMIN — WARFARIN SODIUM 1 MG: 1 TABLET ORAL at 18:37

## 2021-12-02 ASSESSMENT — ACTIVITIES OF DAILY LIVING (ADL)
ADLS_ACUITY_SCORE: 20
ADLS_ACUITY_SCORE: 20
ADLS_ACUITY_SCORE: 22
ADLS_ACUITY_SCORE: 20
ADLS_ACUITY_SCORE: 22
ADLS_ACUITY_SCORE: 20
ADLS_ACUITY_SCORE: 22
ADLS_ACUITY_SCORE: 20
ADLS_ACUITY_SCORE: 20
ADLS_ACUITY_SCORE: 22
ADLS_ACUITY_SCORE: 22
ADLS_ACUITY_SCORE: 20
ADLS_ACUITY_SCORE: 22
ADLS_ACUITY_SCORE: 20
ADLS_ACUITY_SCORE: 22
ADLS_ACUITY_SCORE: 20

## 2021-12-02 NOTE — PROGRESS NOTES
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 Post-op course complicated encephalopathy, and diffuse weakness, acute to subacute by CVA, afib with RVR, BRENNAN, candidemia/candida empyema, and Code Blue due to bradycardia/asystole and hypotension for significant GIB s/p EGD with adherent clot near PEG tube site s/p clips. Transferred from ICU to medicine on 11/23/2021. Weaned off ventilator and chest tubes now removed. Appears ready for discharge to ARU.      ILD and NSIP s/p BSLT on 10/16/2021, POD 38  Acute hypoxic respiratory failure s/p tracheostomy on 10/29  Bilateral pleural effusions   - Transplant pulmonology following, appreciate recs   - IS: s/p basiliximab on 10/16 and 10/20. Continue tacrolimus 2 mg QAM and 2.5 mg QPM (goal 8-12), MMF 1000 mg BID, and prednisone 12.5 mg QAM and 10 mg QPM  - Ppx: Continue bactrim 400-80 mg daily, nystatin QID x6 months  - Monthly Coccidioides no longer suggested by ID  - DSA every 2 weeks   - Levalbuterol and mucomyst QID and pulm toilet and chest PT QID  - PT/OT/SLP   - TD with cuff down and PMSV during the day, capping trial as long as tolerated  - no longer requiring diuresis   - Oxycodone 5-10 mg Q4H   - Stopping robaxin and adding flexaril at night for pain/muscle spasm   - Discussed trying to use oxy Q4H instead of IV and patient agreeable   - Schedule tylenol     L lung cavitary lesion   suspect aspiration vs pseudomonal vs K pneumonia induced abscess. CT on 10/25 with LLL  nodular opacity. Repeat CT chest on 11/22 with new cavitary lesion in the left lung base, and with multifocal bilateral upper lobe GGO (some of which are new), new 1.8 cm fluid collection with surrounding fat stranding in the left axilla, decreased bilateral loculated pleural effusions. Indeterminate Quant Gold, but negative tuberculin skin tests on mulitple occurences. S/p BAL on 11/22. ID feels less likely fungal. B D glucan positive but has known candidemia. Cocci ag in urine negatie, serum histo negative, CRAG negative  - Transplant ID following   - ID recommends stopping zosyn (11/15-11/23), start Ceftazdime 2 grams Q8H (until 12/21) and levaquin 750 mg daily  (until 12/07)  - Follow up CT chest in 4 weeks 12/21  - Please include in the patient's discharge instructions, follow up with Dr. Abebe on 12/21/2021 @6:30 PM in a virtual visit.   - No indicated labs outside of post-transplant labs.     Klebsiella pneumoniae and Pseudomonas fluorescens/putida HAP  Klebsiella initially noted trach sputum culture 11/10. Bronch culture 11/12 with Klebsiella and Pseudomonas fluorescens/putida (R-meropenem).     - Abx as above.       Disseminated Candida   + candida BCx 10/20 and 10/22.  BDG fungitell positive (399) on 10/20. Sputum Cx + Candida albicans persistently.  TC 10/23 without evidence of endocarditis. Ophthalmology consult 10/24 with benign dilated fundoscopic exam.  Candida empyema also noted 10/25, chest tubes inadvertently removed by CVTS 10/28, left chest tube replaced by IR 11/3 as above with ongoing Candida on cultures. Repeat pleural fungal cultures and fungal/bacterial blood cultures on 11/18 NGTD. Transplant as noted above following   - L chest tube removed 12/1  - Initially on Micafungin (10/22-10/27) transition to fluconazole 400 mg IV daily (start date 10/26 end date 12/21)  - ID recommends evacuate any remaining R and/or left loculated effusions      Hypogammaglobinemia  s/p IVIG with plan to repeat  IgG on 12/15     Encephalopathy, resolved   likely multi-factorial. Multiple brain imaging with stroke as noted below, but negative for PRESS. Ammonia negative. vEEG with severe diffuse encephalopathy. LP as noted below negative for infection. Neurology previously following.   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 MRI brain on 10/23 with multifocal subacute infarct within both cerebral hemispheres and left cerebellum. Presumed embolic with afib hx.   - Anticoagulation as noted below      # Afib with hx of RVR - [id number] 4 for HTN, CVA, and DM2. First noted on 10/18, started on amiodarone drip, and converted to NSR. Metoprolol and amidoarone discontinued on 11/20 d/t intermittent bradycardia.   - Anticoagulation with warfarin as noted below  - Continue Rosuvastatin 10 mg daily      R subclavian DVT   dx on 11/4. Resumed on heparin drip on 11/5 and transitioned to warfarin in setting of thrombocytopenia. HIT panel negative on 11/21.   - Continue warfarin per pharmacy protocol (held on 11/26 for thora) will restart.       DM2 with steroid induced hyperglycemia   Hemoglobin A1c 6.6 pre-operatively. Endocrine recently consulted for steroids induced hyperglycemia.   - Continue Lantus 30 mg BID  - Novolog High Sliding scale insulin Q4H  - BG Q4H      Hypomagnesemia   Likely 2/2 suppressed reabsorption from CNI.   - Sliding scale replacement protocol       Diarrhea   C. Diff negative on 11/20. Rectal tube in place on 11/22. Possibly due to tube feeds or magnesium supplementation.   - Monitor I&O      Malnutrition   S/p PEGJ  - MVI, folic acid, B6, B12 supplements   - TF per nutrition      ------ Chronic stable medical problems ------     RA- Dx 5/2021 with + CCP ab and RF. Previously treated with rituximab. Rheum consulted early in admission.  On steroids as noted above.   SALTY - Uses CPAP at bedtime prior to admission. Will need to evaluate for BiPAP after decannulation   HTN- Continue PTA amlodipine 5 mg daily. PRN labetalol and hydralazine for goal SBP <140   Anxiety and depression - Continue PTA lexapro 5 mg daily   Hypothyroidism - TSH 3.17 on 11/19/21. Continue PTA levothyroxine 25 mcg daily      ------ Resolved Hospital problems -------     Recurrent GIB - hgb drop on 10/22 s/p 2 unit pRBC transfusion with EGD on 10/23 with NJ/OG tube trauma with scant oozing. Patient then developed progressive hypotension and ultimately CODE BLUE for GIB in setting of anticoagulation with heparin drip, s/p MTP. EGD on 11/2 with large amount of clotted blood in stomach and area of raised mucosa with small adherent clot near PEG tube site that was clipped, no active bleeding.  Maroon stools 11/3, repeat EGD with extensive old blood in stomach, no active bleeding, small nodular area with prior clips clipped again.  Most recent EGD 11/5 with ulcer noted at PEG tube bumper site, gastritis, and suction marks from G tube. TF noted in G tube drainage bag 11/7, AXR with GJ tube tip projecting over proximal duodenum. GJ tube exchanged 11/9 by GI.  - PO PPI BID      Transaminitis - elevated LFTs post-op, thought to be due to shock liver      BRENNAN - post operative BRENNAN, Cr peaked at 2.05, with renal function back at baseline with Cr at 0.7     Recurring fevers - Fevers post-op, Tmax 101.7 POD #1.  Febrile with worsening leukocytosis again 10/25, generally persisting.  LP (10/29), xanthochromic with pleocytosis thought to be appropriate given RBC and WBCs, no ABX recommended per transplant ID and neurology.  S/p empiric meropenem 10/28-11/2.  Now afebrile, ABX as above.     HSV-  Chronic intermittent active infection pre-transplant with recent HSV infection: crusted lesions throughout left side of jaw, s/p 10 day treatment course of ACV through 10/9.  HSV PCR blood negative  10/17.  S/p ACV ppx as above (started POD #1 instead of POD #8 given HSV history and location).     Hypernatremia, resolved  Due to inability to have oral intake. Resolved now that he is PO and able to access water.       Diet: Regular Diet Adult  Adult Formula Drip Feeding: Continuous Pivot 1.5; Jejunostomy; Goal Rate: 80; mL/hr; From: 4:00 PM; 8:00 AM; Medication - Feeding Tube Flush Frequency: At least 15-30 mL water before and after medication administration and with tube clogging; Amou...  Calorie Counts    DVT Prophylaxis: Heparin SQ  Watson Catheter: Not present  Central Lines: None  Code Status: Full Code      Disposition Plan   Expected discharge: medically ready to discharge to TCU recommended to transitional care unit once antibiotic plan established.     The patient's care was discussed with the Patient and Pulm Consultant.    Gerardo Mahan DO  Hospitalist Service  [facility]  Securely message with the Vocera Web Console (learn more here)  Text page via [employer] Paging/Directory    Please see sign in/sign out for up to date coverage information    Clinically Significant Risk Factors Present on Admission                ______________________________________________________________________    Interval History   Patient seen and examined. Patient had pain overnight and had a dose of IV dilaudid, Discussed this today with patient and that dilaudid is not a sleeping medication. Discussed trying to use oral pan meds more often and adding some adjuvants which patient was agreeable to. Breathing is the same, he does endorse some pleuritic pain with deep breaths. No abdominal pain, no LE edema.    Four point ROS completed and negative unless listed above    Data reviewed today: I reviewed all medications, new labs and imaging results over the last 24 hours. I personally reviewed no images or EKG's today.    Physical Exam   Vital Signs: Temp: 97.8  F (36.6  C) Temp src:  Oral BP: 113/82 Pulse: 98   Resp: 16 SpO2: 94 % O2 Device: None (Room air)    Weight: 145 lbs 15.11 oz  General Appearance: NAD  Respiratory: CTA b/l  Cardiovascular: RRR S1/S2, no m,r,g  GI: soft, NT, ND, +BS  Skin: no rashes  Other: No edema     Data   Recent Labs   Lab 12/02/21  1305 12/02/21  0843 12/02/21  0620 12/01/21  0902 12/01/21  0725 11/30/21  1659 11/30/21  1609 11/30/21  0748 11/30/21  0431   WBC  --   --  9.3  --  10.0  --   --   --  10.4   HGB  --   --  8.7*  --  9.0*  --   --   --  8.9*   MCV  --   --  99  --  98  --   --   --  97   PLT  --   --  90*  --  89*  --   --   --  81*   INR  --   --  2.15*  --  1.74*  --   --   --  1.49*   NA  --   --  140  --  138  --  137  --  139   POTASSIUM  --   --  4.1  --  3.9  --  4.5  --  4.0   CHLORIDE  --   --  104  --  102  --  103  --  103   CO2  --   --  29  --  29  --  30  --  30   BUN  --   --  38*  --  38*  --  45*  --  52*   CR  --   --  0.71  --  0.74  --  0.84  --  0.82   ANIONGAP  --   --  7  --  7  --  4  --  6   ERIK  --   --  9.0  --  9.0  --  9.7  --  9.0   * 151* 126*   < > 150*   < > 122*   < > 133*   ALBUMIN  --   --  2.1*  --  2.2*  --   --   --  2.2*   PROTTOTAL  --   --  5.6*  --  5.5*  --   --   --  5.4*   BILITOTAL  --   --  0.3  --  0.2  --   --   --  0.1*   ALKPHOS  --   --  106  --  103  --   --   --  106   ALT  --   --  28  --  29  --   --   --  27   AST  --   --  24  --  26  --   --   --  25    < > = values in this interval not displayed.     No results found for this or any previous visit (from the past 24 hour(s)).  Medications     dextrose Stopped (10/24/21 1008)     Warfarin Therapy Reminder         acetaminophen  975 mg Oral or Feeding Tube Q8H     acetylcysteine  2 mL Nebulization BID     amLODIPine  5 mg Per G Tube Daily     calcium carbonate 600 mg-vitamin D 400 units  1 tablet Oral or Feeding Tube BID w/meals     cefTAZidime  2 g Intravenous Q8H     cyclobenzaprine  10 mg Oral At Bedtime     escitalopram  5 mg Oral or  Feeding Tube Daily     fiber modular (NUTRISOURCE FIBER)  1 packet Per Feeding Tube BID     fluconazole  400 mg Intravenous Q24H     folic acid-vit B6-vit B12  1 tablet Per G Tube Daily     heparin lock flush  5-20 mL Intracatheter Q24H     insulin aspart  1-12 Units Subcutaneous Q4H     insulin glargine  30 Units Subcutaneous BID     levalbuterol  1.25 mg Nebulization BID     levofloxacin  750 mg Intravenous Q24H     levothyroxine  25 mcg Oral Daily     lidocaine  2 patch Transdermal Q24H     lidocaine   Transdermal Q8H     magnesium oxide  400 mg Oral BID     melatonin  10 mg Oral QPM     menthol   Transdermal Q8H     multivitamins w/minerals  15 mL Per Feeding Tube Daily     mycophenolate  1,000 mg Per J Tube BID     nystatin  1,000,000 Units Swish & Swallow 4x Daily     pantoprazole  40 mg Per Feeding Tube BID     [START ON 12/5/2021] predniSONE  10 mg Oral or Feeding Tube BID     predniSONE  10 mg Per J Tube QPM     predniSONE  12.5 mg Per J Tube Daily     QUEtiapine  25 mg Oral or Feeding Tube BID     QUEtiapine  50 mg Oral At Bedtime     rosuvastatin  10 mg Oral or Feeding Tube Daily     sodium chloride (PF)  10-40 mL Intracatheter Q8H     sodium chloride (PF)  3 mL Intracatheter Q8H     sulfamethoxazole-trimethoprim  1 tablet Oral or Feeding Tube Daily     tacrolimus  2 mg Oral QPM     tacrolimus  2.5 mg Oral QAM     warfarin ANTICOAGULANT  1 mg Oral ONCE at 18:00

## 2021-12-02 NOTE — PROGRESS NOTES
Calorie Count  Intake recorded for: 12/1  Total Kcals: 331 Total Protein: 19g  Kcals from Hospital Food: 331  Protein: 19g  Kcals from Outside Food (average):0 Protein: 0g  # Meals Ordered from Kitchen: 1 meal   # Meals Recorded: 1 meal (First - 100% sausage greg, 50% omelet w/ guevara, cheese & onion, 25% coffee)  # Supplements Recorded: 0

## 2021-12-02 NOTE — PROGRESS NOTES
Assessment and Plan:   Assessment:   Patient A&O x 4. Vital signs stable. No acute changes. SR to Sinus tach with HR 's - increases with activity. Up to chair x 1. Bed side commode x 2. Pivot to commode with assist of 2. Patient oxygen saturation is 96% on room air. Tube feeds held 8am - 4pm. Encouraged food intake - remains on calorie count.       Plan:   Will continue to monitor and assess.

## 2021-12-02 NOTE — PROGRESS NOTES
Pulmonary Medicine  Cystic Fibrosis - Lung Transplant Team  Progress Note  2021       Patient: Edson Thornton  MRN: [id number]  : 1965 (age 56 year old)  Transplant: 10/16/2021 (Lung), POD#47  Admission date: 2021    Assessment & Plan:     Edson Thornton is a 56 year old male with a PMH significant for NSIP/ILD, bronchiectasis, moderate PH, RA, SALTY, chronic HSV infection, hypogammaglobulinemia, steroid-induced diabetes, hypothyroidism, PFO, HTN, HLD, duodenal anomaly, anxiety, and depression.  Admitted on 21 from OSH for acute on chronic respiratory failure 2/2 ILD exacerbation, now s/p BSLT on 10/16/21.  Prolonged vent wean s/p trach and PEG/J tube placement with thoracic surgery 10/29.  Post-op course otherwise complicated by encephalopathy and diffuse weakness, acute to subacute CVA, afib with RVR, BRENNAN, GI bleed, Candidemia/Candida empyema, and anxiety.  Code called  for bradycardia/asystole, progressive hypotensive, found to have significant GI bleed, EGD with adherent clot near PEG tube site that was clipped.  Ongoing improvement in weakness, working on pain management.  Continuous TD since , weaned to RA , and tolerating continuous trach capping since .     Today's recommendations:  - Continue capping trach as tolerated, supplemental oxygen as needed to maintain SpO2 >92%  - Will revisit trach decannulation in the next 1-2 days  - CXR ordered tomorrow  - Tacrolimus level therapeutic, no dose adjustment, repeat level ordered   - Prednisone taper ordered   - CMV and EBV ordered   - Coccidioides Ag (blood) ordered   - ABX duration per transplant ID (will need repeat chest CT around time of transplant ID follow up )  - Continue fluconazole through  per transplant ID, EKG for QTc monitoring ordered   - IgG ordered 12/15  - DSA ordered      S/p BSLT for ILD:  Acute hypoxic respiratory failure, Resolved:   Prolonged vent weaning s/p  trach:  Bilateral pleural effusions: Unfortunately had not received vaccination for flu, PNA, or COVID-19 PTA.  Explant pathology with NSIP, no malignancy.  PGD 2-3.  Weaned off paralytic 10/19 (for vent dyssynchrony) and Caroline 10/22.  Initial difficulty weaning sedation given agitation then with neurological findings as below.  Prolonged vent wean s/p trach and PEG/J tube placement with thoracic surgery 10/29.  Code called 11/2 for bradycardia/asystole (required 1 round of CPR, no medications) then progressively hypotensive, GI bleed as below.  Chest CT 11/2 with increased bilateral pleural effusions (moderate left, small right), bibasilar atelectasis with area of hypoenhancing parenchyma in LLL (suspicious for infection), and numerous nondisplaced anterior rib fractures bilaterally.  S/p left chest tube placement in IR 11/3 for pleural effusion/empyema (as below), right deferred given very little effusion on US.  S/p lytics 11/25-11/28 (CXR showing trapped left lung) and right thoracentesis 11/27.  Left chest tube removed 11/30.  Problems with trach cuff leak 11/3 (requiring multiple exchanges), bronch 11/14 with trach in good position with cuff well sealed in trachea and small to moderate secretions mostly in lower lobes.  Chest CT 11/22 with new cavitary lesion in the left lung base with multifocal GGO in bilateral upper lobes, new 1.8 cm fluid collection with surrounding fat stranding in the left axilla, decreased bilateral loculated pleural effusions, and similar small pericardial effusion.  Continuous TD since 11/20, weaned to RA 11/28, and tolerating continuous trach capping since 11/30.  Working on pain management with increase in activity level.  - Right pleural cultures (11/27) NGTD  - Nebs: levalbuterol and Mucomyst BID  - Pulmonary toilet with chest physiotherapy BID  - Continue capping trach as tolerated, supplemental oxygen as needed to maintain SpO2 >92%  - Will revisit trach decannulation in the next  1-2 days  - Volume management per primary team  - CXR 12/3 (ordered)  - Defer bronch, revisit PRN  - Pain management per primary team     Immunosuppression: s/p induction therapy with basiliximab 10/16 (and high dose IV steroid) and 10/20  - Tacrolimus 2.5 mg qAM / 2 mg qPM (increased 11/30, suspension, via G tube).  Goal level 8-12.  Level 12/2 therapeutic at 9.1, no dose adjustment, repeat level 12/5 (ordered).  - MMF 1000 mg BID (11/2, decreased given GI bleed, AZA to be avoided given TPMT)  - Prednisolone 12.5 mg qAM / 10 mg qPM, next taper due 12/5 (ordered)  Date AM dose (mg) PM dose (mg)   11/21/21 12.5 10   12/5/21 10 10   1/2/22 10 7.5   1/30/22 7.5 7.5   2/27/22 7.5 5   3/27/22 5 5   4/24/22 5 2.5      Prophylaxis:   - Bactrim for PJP ppx (held 11/2-11/5 d/t BRENNAN)  - Nystatin for oral candidiasis ppx, 6 month course  - See below for serologies and viral ppx:    Donor Recipient Intervention   CMV status Negative Negative None, CMV monthly (due 12/14, neg 11/16)   EBV status Positive Positive None, EBV monthly (due 12/14, neg 11/16)   HSV status N/A Positive S/p acyclovir POD #1-30 (recent infection history pre-txp)      ID: Concern for possible Strongyloides exposure pre-transplant s/p ivermectin x1 dose (9/17).  Donor and recipient cultures NGTD.  S/p IV ceftazidime/vancomycin for 48h per protocol and additional empiric ceftazidime 10/19-10/23 given recurrent fevers as below.  Cryptococcal Ag negative 10/29.   - Monthly blood Coccidioides Ag x12 months post-transplant per ID (urine and serum negative 11/17), due 12/17 (ordered)     Recurring fevers, Resolved:  Fevers post-op, Tmax 101.7 POD #1.  Febrile with worsening leukocytosis again 10/25, generally persisting.  LP (10/29), xanthochromic with pleocytosis thought to be appropriate given RBC and WBCs, no ABX recommended per transplant ID and neurology.  S/p empiric meropenem 10/28-11/2.       Klebsiella pneumoniae:  Pseudomonas fluorescens/putida:   L  cavity: Klebsiella initially noted trach sputum culture 11/10.  Started on ceftriaxone 11/11, transitioned to ertapenem 11/12-11/13, and back to ceftriaxone 11/14.  Bronch culture 11/12 with Klebsiella and Pseudomonas fluorescens/putida (R-meropenem).  Chest CT 11/22 with LLL cavity as above, BAL with Klebsiella pneumoniae.  Histo Ag 11/22, 11/23 and Blast Ag 11/22 negative.  - ABX: ceftazidime (11/23-12/21) and levofloxacin (11/23-12/7) per transplant ID; s/p Zosyn (11/15-11/23)  - Transplant ID follow up scheduled 12/21 with repeat chest CT prior      Disseminated Candida: Noted on blood cultures 10/20 and 10/22.  BDG fungitell positive (399) on 10/20.  Respiratory cultures with persistent Candida albicans.  TC 10/23 without evidence of endocarditis.  Ophthalmology consult 10/24 with benign dilated fundoscopic exam.  Candida empyema also noted 10/25, chest tubes inadvertently removed by CVTS 10/28, left chest tube replaced by IR 11/3 as above with ongoing Candida on cultures (11/3, 11/18).  BDG fungitell positive (>500) and Aspergillus galactomannan negative 11/22.  - Fluconazole (10/26-12/21) per transplant ID, repeat EKG for QTc monitoring 12/7 (ordered); s/p micafungin 10/22-10/27     HSV: Chronic intermittent active infection pre-transplant with recent HSV infection: crusted lesions throughout left side of jaw, s/p 10 day treatment course of ACV through 10/9.  HSV PCR blood negative 10/17.  S/p ACV ppx as above (started POD #1 instead of POD #8 given HSV history and location).     Hypogammaglobulinemia: IgG previously low at 364 (9/7).  Noted at 265 at time of transplant, s/p IVIG 10/21, repeat IgG (11/17) 198, s/p IVIG (with premedication) 11/18.  - Repeat IgG (12/15, ordered)     Positive cross match:   Positive DSA: Note that he received two doses of rituximab in June, which is likely contributing to cross match result.  DSA had been negative through 11/15 and newly positive 11/29 with DQB5 and mfi  2522.  - DSA monitoring weekly (12/6, ordered)     SALTY: Noted pre-transplant.  Home CPAP 6-12 cm H2O.  - Evaluate need for BiPAP after decannulation.     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  MRI 10/23 with multifocal subacute infarcts within both cerebral hemispheres and left cerebellum.  DDx include surgery v embolic v infectious.  Heparin drip started 10/23 (intermittently held with GI bleed as below).  Repeat stroke code 10/25 d/t marked decrease in responsiveness with sedation wean, pupil inequality, and absent gag reflex.  CTA head without obvious new pathology, MRI brain (with and without contrast) primarily revealing for infarct, low likelihood of PRES.  Ammonia normal.  VEEG per neuro with severe diffuse encephalopathy.  Ongoing improvement since 10/29, repeat head CT 11/2 (following code) without new acute intracranial abnormalities.  - AC management per primary team as below   - PT/OT      Afib with RVR: Noted 10/18, started on amiodarone drip and converted to SR, transitioned to PO 10/21, decreased 10/29.  Metoprolol and amiodarone discontinued 11/19 d/t intermittent bradycardia.  AC per primary team.      Right subclavian DVT: Seen on UE US from 11/4.  Heparin drip resumed 11/5, transitioned to warfarin 11/20 and in the setting of thrombocytopenia.  HIT negative 11/21.      Recurrent GI bleed, Resolved: Hemoglobin dropped to 6.6 10/22, s/p 2 units pRBC.  OG tube with bloody output, EGD 10/23 noted NJ/OG tube trauma with scant oozing.  Progressive hypotension 11/2, hemoglobin 5.8 with bloody G tube output.  Heparin drip held, transitioned to PPI drip, and MTP activated.  EGD 11/2 with large amount of clotted blood in  stomach and area of raised mucosa with small adherent clot near PEG tube site that was clipped, no active bleeding.  Maroon stools 11/3, repeat EGD with extensive old blood in stomach, no active bleeding, small nodular area with prior clips clipped again.  Most recent EGD 11/5 with ulcer noted at PEG tube bumper site, gastritis, and suction marks from G tube. TF noted in G tube drainage bag 11/7, AXR with GJ tube tip projecting over proximal duodenum. GJ tube exchanged 11/9 by GI.      Elevated LFTs, Resolved: Shock liver post-op, now resolved.  Intermittent alk phos elevation, recently normal.     Hypomagnesemia: Suppressed reabsorption 2/2 CNI.  Requiring intermittent IV and PO replacement.  - Mag-Ox 400 mg BID    We appreciate the excellent care provided by the [place] [id number] team.  Recommendations communicated via in person rounding and this note.  Will continue to follow along closely, please do not hesitate to call with any questions or concerns.    Patient discussed with Dr. Sifuentes.    Keyla Rice PA-C  Inpatient PRINCESS  Pulmonary CF/Transplant     Subjective & Interval History:     Difficulty with adequate pain control in the evening due to increased activity during the day.  IV Dilaudid reordered overnight.  Feels incisional pain is somewhat controlled with PO oxycodone.  Trach remains capped, on RA, gradually improving dyspnea both at rest and with activity.  Received chest physiotherapy BID yesterday.  Appetite poor, intake of 331 kcal and 19 g protein yesterday.    Review of Systems:     C: No fever, no chills, no change in weight  INTEGUMENTARY/SKIN: No rash or obvious new lesions  ENT/MOUTH: No sore throat, no sinus pain, no nasal congestion or drainage  RESP: See interval history  CV: No peripheral edema  GI: No nausea, no vomiting, no change in stools, no reflux symptoms  : No dysuria  MUSCULOSKELETAL: See interval history  ENDOCRINE: + blood sugars intermittently elevated  NEURO: No headache, no  "numbness or tingling  PSYCHIATRIC: Mood stable    Physical Exam:     Vital signs:  Temp: 97.8  F (36.6  C) Temp src: Oral BP: 113/82 Pulse: 98   Resp: 16 SpO2: 94 % O2 Device: None (Room air) Oxygen Delivery: 2 LPM Height: 162.6 cm (5' 4.02\") Weight: 66.2 kg (145 lb 15.1 oz)  I/O:     Intake/Output Summary (Last 24 hours) at 12/2/2021 1334  Last data filed at 12/2/2021 1200  Gross per 24 hour   Intake 360 ml   Output 650 ml   Net -290 ml     Constitutional: Sitting up in bed, in no apparent distress.   HEENT: Eyes with pink conjunctivae, anicteric.  Oral mucosa moist without lesions.  Trach capped.  PULM: Mildly diminished air flow to bases bilaterally.  No crackles, no rhonchi, no wheezes.  Non-labored breathing on RA.  CV: Normal S1 and S2.  RRR.  No murmur, gallop, or rub.  No peripheral edema.   ABD: NABS, soft, nontender, nondistended.    MSK: Moves all extremities.  + muscle wasting.   NEURO: Alert, conversant.   SKIN: Warm, dry.  No rash on limited exam.  Clamshell incision intact without drainage.   PSYCH: Mood stable, calm.     Lines, Drains, and Devices:  PICC Triple Lumen 11/04/21 Left Basilic Access. PICC okay to use. (Active)   Site Assessment WDL 12/02/21 1200   External Cath Length (cm) 1 cm 11/30/21 1600   Extremity Circumference (cm) 28 cm 11/28/21 1038   Dressing Intervention Chlorhexidine patch 12/02/21 1200   Dressing Change Due 12/05/21 12/02/21 0400   PICC Comment CDI/Statlock 11/28/21 1038   Mosley - Status saline locked 12/02/21 1200   [place]osley - Cap Change Due 11/30/21 11/30/21 1600   Red - Status saline locked 12/02/21 1200   Red - Cap Change Due 11/30/21 11/30/21 1600   White - Status saline locked 12/02/21 1200   White - Cap Change Due 11/30/21 11/30/21 1600   Extravasation? No 12/01/21 0800   Line Necessity Yes, meets criteria 12/02/21 1200   Number of days: 28     Data:     LABS    CMP:   Recent Labs   Lab 12/02/21  1305 12/02/21  0843 12/02/21  0620 12/02/21  0522 12/01/21  0902 " 12/01/21  0725 11/30/21  1659 11/30/21  1609 11/30/21  0748 11/30/21  0431 11/29/21  0532 11/29/21  0504   NA  --   --  140  --   --  138  --  137  --  139   < > 137  137   POTASSIUM  --   --  4.1  --   --  3.9  --  4.5  --  4.0   < > 4.3  4.3   CHLORIDE  --   --  104  --   --  102  --  103  --  103   < > 101  101   CO2  --   --  29  --   --  29  --  30  --  30   < > 32  32   ANIONGAP  --   --  7  --   --  7  --  4  --  6   < > 4  4   * 151* 126* 99   < > 150*   < > 122*   < > 133*   < > 137*  137*   BUN  --   --  38*  --   --  38*  --  45*  --  52*   < > 60*  60*   CR  --   --  0.71  --   --  0.74  --  0.84  --  0.82   < > 0.93  0.93   GFRESTIMATED  --   --  >90  --   --  >90  --  >90  --  >90   < > >90  >90   ERIK  --   --  9.0  --   --  9.0  --  9.7  --  9.0   < > 9.1  9.1   MAG  --   --  1.6  --   --  1.6  --   --   --  1.7  --  1.7   PHOS  --   --  3.2  --   --  3.3  --   --   --  3.2  --  3.7   PROTTOTAL  --   --  5.6*  --   --  5.5*  --   --   --  5.4*  --  5.7*   ALBUMIN  --   --  2.1*  --   --  2.2*  --   --   --  2.2*  --  2.2*   BILITOTAL  --   --  0.3  --   --  0.2  --   --   --  0.1*  --  0.2   ALKPHOS  --   --  106  --   --  103  --   --   --  106  --  108   AST  --   --  24  --   --  26  --   --   --  25  --  26   ALT  --   --  28  --   --  29  --   --   --  27  --  25    < > = values in this interval not displayed.     CBC:   Recent Labs   Lab 12/02/21  0620 12/01/21  0725 11/30/21  0431 11/29/21  0504   WBC 9.3 10.0 10.4 12.2*   RBC 2.86* 2.94* 2.92* 3.00*   HGB 8.7* 9.0* 8.9* 9.2*   HCT 28.2* 28.9* 28.3* 29.5*   MCV 99 98 97 98   MCH 30.4 30.6 30.5 30.7   MCHC 30.9* 31.1* 31.4* 31.2*   RDW 15.3* 15.4* 15.6* 15.7*   PLT 90* 89* 81* 76*       INR:   Recent Labs   Lab 12/02/21  0620 12/01/21 0725 11/30/21  0431 11/29/21  0504   INR 2.15* 1.74* 1.49* 1.31*       Glucose:   Recent Labs   Lab 12/02/21  1305 12/02/21  0843 12/02/21  0620 12/02/21  0522 12/02/21  0053 12/01/21  1944   GLC  125* 151* 126* 99 194* 209*       Blood Gas:   Recent Labs   Lab 12/01/21  0725   PHV 7.42   PCO2V 50   PO2V 43   HCO3V 32*   LORI 6.6*   O2PER 2       Culture Data No results for input(s): CULT in the last 168 hours.    Virology Data:   Lab Results   Component Value Date    FLUAH1 Negative 11/22/2021    FLUAH3 Negative 11/22/2021    BW0105 Negative 11/22/2021    IFLUB Negative 11/22/2021    RSVA Negative 11/22/2021    RSVB Negative 11/22/2021    PIV1 Negative 11/22/2021    PIV2 Negative 11/22/2021    PIV3 Negative 11/22/2021    HMPV Negative 11/22/2021    HRVS Negative 11/22/2021    ADVBE Negative 11/22/2021    ADVC Negative 11/22/2021    ADVC Negative 11/12/2021    ADVC Negative 10/17/2021       Historical CMV results (last 3 of prior testing):  Lab Results   Component Value Date    CMVQNT Not Detected 11/22/2021    CMVQNT Not Detected 11/16/2021    CMVQNT Not Detected 11/12/2021     No results found for: CMVLOG    Urine Studies    Recent Labs   Lab Test 11/01/21  1336 10/25/21  1507   URINEPH 5.5 5.5   NITRITE Negative Negative   LEUKEST Negative Negative   WBCU 0 2       Most Recent [facility] Pulmonary Function Testing (FVC/FEV1 only)  No results found for: 20002  No results found for: 20003  No results found for: 20015  No results found for: 20016    IMAGING    Recent Results (from the past 48 hour(s))   XR Chest Port 1 View    Narrative    Portable chest 11/30/2021 at 1453 hours    COMPARISON: Earlier today 0845 hours    INDICATION: Chest tube removal    FINDINGS: Tracheostomy. Left chest catheter removed. Clamshell  sternotomy from prior bilateral lung transplant. Small left pleural  effusion and trace right pleural effusion with lung base atelectasis  bilaterally.      Impression    IMPRESSION: Trace bilateral pleural effusions. Bilateral lung  transplant. Bibasilar atelectasis. Tracheostomy.    JODI MENDEZ MD         SYSTEM ID:  [id number]   XR Chest Port 1 View    Narrative    Exam: XR CHEST PORT 1 VIEW,  12/1/2021 8:47 AM    Indication: s/p lung transplant    Comparison: Chest x-ray 11/30/2021    Findings:     Frontal view x-ray of the chest. Tracheostomy tube projects over the  midthoracic trachea. Unchanged left upper extremity PICC, clamshell  sternotomy wires, mediastinal clips. No appreciable pneumothorax.  Persistent small bilateral pleural effusions. Unchanged  cardiomediastinal silhouette. Visualized osseous structure and upper  abdomen within normal limits. Bibasilar atelectasis..      Impression    Impression:     1. Postsurgical changes of lung transplant.  2. Small bilateral pleural effusions and bibasilar atelectasis.    SHANDRA CEVALLOS MD         SYSTEM ID:  [id number]

## 2021-12-02 NOTE — PROGRESS NOTES
Assessment and Plan:   Assessment:   No significant assessment changes. Patient A&O x 4. Trach capped - Patient on RA with O2 saturations >94%. Patient Sinus rhythm to sinus tach with activity. Patient hear rate 95 @ rest and up to 121 when active. Patient Up to chair x 2. Patient ambulated in segura with PT 50 ft. Patient's tube feeds clamped [id number].       Plan:   Will continue to monitor and assess. Will continue to encourage activity.

## 2021-12-03 ENCOUNTER — APPOINTMENT (OUTPATIENT)
Dept: GENERAL RADIOLOGY | Facility: CLINIC | Age: 56
End: 2021-12-03
Attending: PHYSICIAN ASSISTANT
Payer: COMMERCIAL

## 2021-12-03 ENCOUNTER — APPOINTMENT (OUTPATIENT)
Dept: OCCUPATIONAL THERAPY | Facility: CLINIC | Age: 56
End: 2021-12-03
Attending: STUDENT IN AN ORGANIZED HEALTH CARE EDUCATION/TRAINING PROGRAM
Payer: COMMERCIAL

## 2021-12-03 ENCOUNTER — APPOINTMENT (OUTPATIENT)
Dept: PHYSICAL THERAPY | Facility: CLINIC | Age: 56
End: 2021-12-03
Attending: STUDENT IN AN ORGANIZED HEALTH CARE EDUCATION/TRAINING PROGRAM
Payer: COMMERCIAL

## 2021-12-03 LAB
ALBUMIN SERPL-MCNC: 2.2 G/DL (ref 3.4–5)
ALP SERPL-CCNC: 106 U/L (ref 40–150)
ALT SERPL W P-5'-P-CCNC: 27 U/L (ref 0–70)
ANION GAP SERPL CALCULATED.3IONS-SCNC: 5 MMOL/L (ref 3–14)
AST SERPL W P-5'-P-CCNC: 21 U/L (ref 0–45)
BILIRUB SERPL-MCNC: 0.1 MG/DL (ref 0.2–1.3)
BUN SERPL-MCNC: 37 MG/DL (ref 7–30)
CALCIUM SERPL-MCNC: 8.8 MG/DL (ref 8.5–10.1)
CHLORIDE BLD-SCNC: 105 MMOL/L (ref 94–109)
CO2 SERPL-SCNC: 28 MMOL/L (ref 20–32)
CREAT SERPL-MCNC: 0.66 MG/DL (ref 0.66–1.25)
ERYTHROCYTE [DISTWIDTH] IN BLOOD BY AUTOMATED COUNT: 15.2 % (ref 10–15)
GFR SERPL CREATININE-BSD FRML MDRD: >90 ML/MIN/1.73M2
GLUCOSE BLD-MCNC: 222 MG/DL (ref 70–99)
GLUCOSE BLDC GLUCOMTR-MCNC: 103 MG/DL (ref 70–99)
GLUCOSE BLDC GLUCOMTR-MCNC: 142 MG/DL (ref 70–99)
GLUCOSE BLDC GLUCOMTR-MCNC: 164 MG/DL (ref 70–99)
GLUCOSE BLDC GLUCOMTR-MCNC: 223 MG/DL (ref 70–99)
GLUCOSE BLDC GLUCOMTR-MCNC: 275 MG/DL (ref 70–99)
GLUCOSE BLDC GLUCOMTR-MCNC: 72 MG/DL (ref 70–99)
HCT VFR BLD AUTO: 27.4 % (ref 40–53)
HGB BLD-MCNC: 8.6 G/DL (ref 13.3–17.7)
INR PPP: 2.32 (ref 0.85–1.15)
MAGNESIUM SERPL-MCNC: 1.3 MG/DL (ref 1.6–2.3)
MCH RBC QN AUTO: 30.5 PG (ref 26.5–33)
MCHC RBC AUTO-ENTMCNC: 31.4 G/DL (ref 31.5–36.5)
MCV RBC AUTO: 97 FL (ref 78–100)
PHOSPHATE SERPL-MCNC: 2.4 MG/DL (ref 2.5–4.5)
PLATELET # BLD AUTO: 91 10E3/UL (ref 150–450)
POTASSIUM BLD-SCNC: 4.4 MMOL/L (ref 3.4–5.3)
PROT SERPL-MCNC: 5.6 G/DL (ref 6.8–8.8)
RBC # BLD AUTO: 2.82 10E6/UL (ref 4.4–5.9)
SODIUM SERPL-SCNC: 138 MMOL/L (ref 133–144)
WBC # BLD AUTO: 10.8 10E3/UL (ref 4–11)

## 2021-12-03 PROCEDURE — 36592 COLLECT BLOOD FROM PICC: CPT | Performed by: THORACIC SURGERY (CARDIOTHORACIC VASCULAR SURGERY)

## 2021-12-03 PROCEDURE — 97530 THERAPEUTIC ACTIVITIES: CPT | Mod: GP

## 2021-12-03 PROCEDURE — 84100 ASSAY OF PHOSPHORUS: CPT | Performed by: THORACIC SURGERY (CARDIOTHORACIC VASCULAR SURGERY)

## 2021-12-03 PROCEDURE — 250N000013 HC RX MED GY IP 250 OP 250 PS 637: Performed by: STUDENT IN AN ORGANIZED HEALTH CARE EDUCATION/TRAINING PROGRAM

## 2021-12-03 PROCEDURE — 94640 AIRWAY INHALATION TREATMENT: CPT

## 2021-12-03 PROCEDURE — 250N000011 HC RX IP 250 OP 636: Performed by: STUDENT IN AN ORGANIZED HEALTH CARE EDUCATION/TRAINING PROGRAM

## 2021-12-03 PROCEDURE — 97116 GAIT TRAINING THERAPY: CPT | Mod: GP

## 2021-12-03 PROCEDURE — 250N000013 HC RX MED GY IP 250 OP 250 PS 637: Performed by: PHYSICIAN ASSISTANT

## 2021-12-03 PROCEDURE — 85027 COMPLETE CBC AUTOMATED: CPT | Performed by: THORACIC SURGERY (CARDIOTHORACIC VASCULAR SURGERY)

## 2021-12-03 PROCEDURE — 250N000013 HC RX MED GY IP 250 OP 250 PS 637: Performed by: ANESTHESIOLOGY

## 2021-12-03 PROCEDURE — 250N000013 HC RX MED GY IP 250 OP 250 PS 637

## 2021-12-03 PROCEDURE — 250N000012 HC RX MED GY IP 250 OP 636 PS 637: Performed by: INTERNAL MEDICINE

## 2021-12-03 PROCEDURE — 999N000157 HC STATISTIC RCP TIME EA 10 MIN

## 2021-12-03 PROCEDURE — 250N000012 HC RX MED GY IP 250 OP 636 PS 637: Performed by: PHYSICIAN ASSISTANT

## 2021-12-03 PROCEDURE — 99207 PR CDG-MDM COMPONENT: MEETS MODERATE - UP CODED: CPT | Performed by: PSYCHIATRY & NEUROLOGY

## 2021-12-03 PROCEDURE — 71046 X-RAY EXAM CHEST 2 VIEWS: CPT

## 2021-12-03 PROCEDURE — 99254 IP/OBS CNSLTJ NEW/EST MOD 60: CPT | Performed by: PSYCHIATRY & NEUROLOGY

## 2021-12-03 PROCEDURE — 250N000013 HC RX MED GY IP 250 OP 250 PS 637: Performed by: NURSE PRACTITIONER

## 2021-12-03 PROCEDURE — 83735 ASSAY OF MAGNESIUM: CPT | Performed by: THORACIC SURGERY (CARDIOTHORACIC VASCULAR SURGERY)

## 2021-12-03 PROCEDURE — 250N000009 HC RX 250: Performed by: INTERNAL MEDICINE

## 2021-12-03 PROCEDURE — 82040 ASSAY OF SERUM ALBUMIN: CPT | Performed by: THORACIC SURGERY (CARDIOTHORACIC VASCULAR SURGERY)

## 2021-12-03 PROCEDURE — 71046 X-RAY EXAM CHEST 2 VIEWS: CPT | Mod: 26 | Performed by: RADIOLOGY

## 2021-12-03 PROCEDURE — 250N000009 HC RX 250: Performed by: STUDENT IN AN ORGANIZED HEALTH CARE EDUCATION/TRAINING PROGRAM

## 2021-12-03 PROCEDURE — 99207 PR NON-BILLABLE SERV PER CHARTING: CPT | Mod: 24 | Performed by: PHYSICIAN ASSISTANT

## 2021-12-03 PROCEDURE — 214N000001 HC R&B CCU UMMC

## 2021-12-03 PROCEDURE — 97530 THERAPEUTIC ACTIVITIES: CPT | Mod: GO

## 2021-12-03 PROCEDURE — 99233 SBSQ HOSP IP/OBS HIGH 50: CPT | Performed by: STUDENT IN AN ORGANIZED HEALTH CARE EDUCATION/TRAINING PROGRAM

## 2021-12-03 PROCEDURE — 99233 SBSQ HOSP IP/OBS HIGH 50: CPT | Mod: 24 | Performed by: INTERNAL MEDICINE

## 2021-12-03 PROCEDURE — 85610 PROTHROMBIN TIME: CPT | Performed by: THORACIC SURGERY (CARDIOTHORACIC VASCULAR SURGERY)

## 2021-12-03 PROCEDURE — 250N000013 HC RX MED GY IP 250 OP 250 PS 637: Performed by: THORACIC SURGERY (CARDIOTHORACIC VASCULAR SURGERY)

## 2021-12-03 RX ORDER — WARFARIN SODIUM 1 MG/1
1 TABLET ORAL
Status: COMPLETED | OUTPATIENT
Start: 2021-12-03 | End: 2021-12-03

## 2021-12-03 RX ORDER — MAGNESIUM SULFATE HEPTAHYDRATE 40 MG/ML
4 INJECTION, SOLUTION INTRAVENOUS ONCE
Status: COMPLETED | OUTPATIENT
Start: 2021-12-03 | End: 2021-12-03

## 2021-12-03 RX ORDER — FUROSEMIDE 40 MG
40 TABLET ORAL DAILY
Status: DISCONTINUED | OUTPATIENT
Start: 2021-12-03 | End: 2021-12-13 | Stop reason: HOSPADM

## 2021-12-03 RX ORDER — ZINC/PETROLATUM,WHITE
PASTE (GRAM) TOPICAL
Status: DISCONTINUED | OUTPATIENT
Start: 2021-12-03 | End: 2021-12-13 | Stop reason: HOSPADM

## 2021-12-03 RX ADMIN — QUETIAPINE FUMARATE 25 MG: 25 TABLET ORAL at 08:54

## 2021-12-03 RX ADMIN — INSULIN ASPART 4 UNITS: 100 INJECTION, SOLUTION INTRAVENOUS; SUBCUTANEOUS at 00:13

## 2021-12-03 RX ADMIN — INSULIN ASPART 1 UNITS: 100 INJECTION, SOLUTION INTRAVENOUS; SUBCUTANEOUS at 20:12

## 2021-12-03 RX ADMIN — ACETAMINOPHEN 975 MG: 325 TABLET, FILM COATED ORAL at 05:59

## 2021-12-03 RX ADMIN — MULTIVIT AND MINERALS-FERROUS GLUCONATE 9 MG IRON/15 ML ORAL LIQUID 15 ML: at 13:06

## 2021-12-03 RX ADMIN — ESCITALOPRAM 5 MG: 5 TABLET, FILM COATED ORAL at 08:54

## 2021-12-03 RX ADMIN — ACETAMINOPHEN 975 MG: 325 TABLET, FILM COATED ORAL at 22:29

## 2021-12-03 RX ADMIN — SULFAMETHOXAZOLE AND TRIMETHOPRIM 1 TABLET: 400; 80 TABLET ORAL at 08:54

## 2021-12-03 RX ADMIN — CYCLOBENZAPRINE HYDROCHLORIDE 10 MG: 5 TABLET, FILM COATED ORAL at 22:29

## 2021-12-03 RX ADMIN — Medication 1 PACKET: at 20:22

## 2021-12-03 RX ADMIN — LEVOFLOXACIN 750 MG: 5 INJECTION, SOLUTION INTRAVENOUS at 17:40

## 2021-12-03 RX ADMIN — CALCIUM CARBONATE 600 MG (1,500 MG)-VITAMIN D3 400 UNIT TABLET 1 TABLET: at 17:40

## 2021-12-03 RX ADMIN — TACROLIMUS 2.5 MG: 5 CAPSULE ORAL at 08:53

## 2021-12-03 RX ADMIN — HYDROXYZINE HYDROCHLORIDE 25 MG: 25 TABLET, FILM COATED ORAL at 22:29

## 2021-12-03 RX ADMIN — AMLODIPINE BESYLATE 5 MG: 2.5 TABLET ORAL at 08:54

## 2021-12-03 RX ADMIN — Medication 10 MG: at 20:07

## 2021-12-03 RX ADMIN — CALCIUM CARBONATE 600 MG (1,500 MG)-VITAMIN D3 400 UNIT TABLET 1 TABLET: at 12:29

## 2021-12-03 RX ADMIN — NYSTATIN 1000000 UNITS: 500000 SUSPENSION ORAL at 09:50

## 2021-12-03 RX ADMIN — Medication 1 TABLET: at 12:29

## 2021-12-03 RX ADMIN — Medication 400 MG: at 20:07

## 2021-12-03 RX ADMIN — INSULIN GLARGINE 30 UNITS: 100 INJECTION, SOLUTION SUBCUTANEOUS at 08:53

## 2021-12-03 RX ADMIN — LEVOTHYROXINE SODIUM 25 MCG: 0.03 TABLET ORAL at 08:54

## 2021-12-03 RX ADMIN — CEFTAZIDIME 2 G: 2 INJECTION, POWDER, FOR SOLUTION INTRAVENOUS at 03:08

## 2021-12-03 RX ADMIN — WARFARIN SODIUM 1 MG: 1 TABLET ORAL at 17:40

## 2021-12-03 RX ADMIN — ACETAMINOPHEN 975 MG: 325 TABLET, FILM COATED ORAL at 15:08

## 2021-12-03 RX ADMIN — Medication 40 MG: at 20:17

## 2021-12-03 RX ADMIN — MYCOPHENOLATE MOFETIL 1000 MG: 200 POWDER, FOR SUSPENSION ORAL at 20:17

## 2021-12-03 RX ADMIN — Medication 9 MMOL: at 15:18

## 2021-12-03 RX ADMIN — CEFTAZIDIME 2 G: 2 INJECTION, POWDER, FOR SOLUTION INTRAVENOUS at 20:05

## 2021-12-03 RX ADMIN — LEVALBUTEROL HYDROCHLORIDE 1.25 MG: 1.25 SOLUTION RESPIRATORY (INHALATION) at 08:38

## 2021-12-03 RX ADMIN — FUROSEMIDE 40 MG: 40 TABLET ORAL at 15:08

## 2021-12-03 RX ADMIN — Medication 40 MG: at 08:56

## 2021-12-03 RX ADMIN — MYCOPHENOLATE MOFETIL 1000 MG: 200 POWDER, FOR SUSPENSION ORAL at 08:53

## 2021-12-03 RX ADMIN — ROSUVASTATIN CALCIUM 10 MG: 10 TABLET, FILM COATED ORAL at 12:29

## 2021-12-03 RX ADMIN — FLUCONAZOLE IN SODIUM CHLORIDE 400 MG: 2 INJECTION, SOLUTION INTRAVENOUS at 09:50

## 2021-12-03 RX ADMIN — NYSTATIN 1000000 UNITS: 500000 SUSPENSION ORAL at 15:39

## 2021-12-03 RX ADMIN — INSULIN ASPART 1 UNITS: 100 INJECTION, SOLUTION INTRAVENOUS; SUBCUTANEOUS at 15:58

## 2021-12-03 RX ADMIN — CEFTAZIDIME 2 G: 2 INJECTION, POWDER, FOR SOLUTION INTRAVENOUS at 12:27

## 2021-12-03 RX ADMIN — QUETIAPINE FUMARATE 25 MG: 25 TABLET ORAL at 15:08

## 2021-12-03 RX ADMIN — INSULIN GLARGINE 30 UNITS: 100 INJECTION, SOLUTION SUBCUTANEOUS at 20:12

## 2021-12-03 RX ADMIN — Medication 1 PACKET: at 12:30

## 2021-12-03 RX ADMIN — PREDNISONE 12.5 MG: 2.5 TABLET ORAL at 08:55

## 2021-12-03 RX ADMIN — PREDNISONE 10 MG: 5 TABLET ORAL at 20:07

## 2021-12-03 RX ADMIN — NYSTATIN 1000000 UNITS: 500000 SUSPENSION ORAL at 20:23

## 2021-12-03 RX ADMIN — TACROLIMUS 2 MG: 5 CAPSULE ORAL at 17:40

## 2021-12-03 RX ADMIN — INSULIN ASPART 6 UNITS: 100 INJECTION, SOLUTION INTRAVENOUS; SUBCUTANEOUS at 03:07

## 2021-12-03 RX ADMIN — ACETYLCYSTEINE 2 ML: 200 SOLUTION ORAL; RESPIRATORY (INHALATION) at 08:38

## 2021-12-03 RX ADMIN — QUETIAPINE FUMARATE 50 MG: 50 TABLET ORAL at 20:07

## 2021-12-03 RX ADMIN — Medication 400 MG: at 12:29

## 2021-12-03 RX ADMIN — MAGNESIUM SULFATE IN WATER 4 G: 40 INJECTION, SOLUTION INTRAVENOUS at 09:57

## 2021-12-03 ASSESSMENT — ACTIVITIES OF DAILY LIVING (ADL)
ADLS_ACUITY_SCORE: 20

## 2021-12-03 NOTE — PROGRESS NOTES
Pulmonary Medicine  Cystic Fibrosis - Lung Transplant Team  Progress Note  2021       Patient: Edson Thornton  MRN: [id number]  : 1965 (age 56 year old)  Transplant: 10/16/2021 (Lung), POD#48  Admission date: 2021    Assessment & Plan:     Edson Thornton is a 56 year old male with a PMH significant for NSIP/ILD, bronchiectasis, moderate PH, RA, SALTY, chronic HSV infection, hypogammaglobulinemia, steroid-induced diabetes, hypothyroidism, PFO, HTN, HLD, duodenal anomaly, anxiety, and depression.  Admitted on 21 from OSH for acute on chronic respiratory failure 2/2 ILD exacerbation, now s/p BSLT on 10/16/21.  Prolonged vent wean s/p trach and PEG/J tube placement with thoracic surgery 10/29.  Post-op course otherwise complicated by encephalopathy and diffuse weakness, acute to subacute CVA, afib with RVR, BRENNAN, GI bleed, Candidemia/Candida empyema, and anxiety.  Code called  for bradycardia/asystole, progressive hypotensive, found to have significant GI bleed, EGD with adherent clot near PEG tube site that was clipped.  Ongoing improvement in weakness and pain management.  Continuous TD since , weaned to RA , and tolerating continuous trach capping since .     Today's recommendations:  - Continue capping trach as tolerated, will revisit trach decannulation in the next 1-2 days  - Diuresis today  - CXR today as below, repeat tomorrow ordered  - Tacrolimus level therapeutic, no dose adjustment, repeat level ordered   - Prednisone taper ordered   - CMV and EBV ordered   - Coccidioides Ag (blood) ordered   - Sputum cultures ordered  - ABX duration per transplant ID (will need repeat chest CT around time of transplant ID follow up )  - Continue fluconazole through  per transplant ID, EKG for QTc monitoring ordered   - IgG ordered 12/15  - DSA ordered   - Monitor ability to increase oral magnesium supplementation pending stools  - Patient  agreeable to psychiatry consult, consider health psychology consult as well     S/p BSLT for ILD:  Acute hypoxic respiratory failure, Resolved:   Prolonged vent weaning s/p trach:  Bilateral pleural effusions: Unfortunately had not received vaccination for flu, PNA, or COVID-19 PTA.  Explant pathology with NSIP, no malignancy.  PGD 2-3.  Weaned off paralytic 10/19 (for vent dyssynchrony) and Caroline 10/22.  Initial difficulty weaning sedation given agitation then with neurological findings as below.  Prolonged vent wean s/p trach and PEG/J tube placement with thoracic surgery 10/29.  Code called 11/2 for bradycardia/asystole (required 1 round of CPR, no medications) then progressively hypotensive, GI bleed as below.  Chest CT 11/2 with increased bilateral pleural effusions (moderate left, small right), bibasilar atelectasis with area of hypoenhancing parenchyma in LLL (suspicious for infection), and numerous nondisplaced anterior rib fractures bilaterally.  S/p left chest tube placement in IR 11/3 for pleural effusion/empyema (as below), right deferred given very little effusion on US.  S/p lytics 11/25-11/28 (CXR showing trapped left lung) and right thoracentesis 11/27.  Left chest tube removed 11/30.  Problems with trach cuff leak 11/3 (requiring multiple exchanges), bronch 11/14 with trach in good position with cuff well sealed in trachea and small to moderate secretions mostly in lower lobes.  Chest CT 11/22 with new cavitary lesion in the left lung base with multifocal GGO in bilateral upper lobes, new 1.8 cm fluid collection with surrounding fat stranding in the left axilla, decreased bilateral loculated pleural effusions, and similar small pericardial effusion.  Continuous TD since 11/20, weaned to RA 11/28, and tolerating continuous trach capping since 11/30 (last needed oxygen 12/1).    - Right pleural cultures (11/27) NGTD  - Nebs: levalbuterol and Mucomyst BID  - Pulmonary toilet with chest  physiotherapy BID  - Continue capping trach as tolerated, supplemental oxygen as needed to maintain SpO2 >92%  - Will revisit trach decannulation in the next 1-2 days  - Volume management per primary team, diuresis today  - CXR today with moderate left-sided loculated pleural effusion, stable small right-sided pleural effusion, and bibasilar atelectasis (personally reviewed with Dr. Sifuentes), repeat 12/4 (ordered)  - Defer bronch, revisit PRN  - Pain management per primary team  - May need overnight oximetry study prior to discharge     Immunosuppression: s/p induction therapy with basiliximab 10/16 (and high dose IV steroid) and 10/20  - Tacrolimus 2.5 mg qAM / 2 mg qPM (increased 11/30, suspension, via G tube).  Goal level 8-12.  Repeat level 12/5 (ordered).  - MMF 1000 mg BID (11/2, decreased given GI bleed, AZA to be avoided given TPMT)  - Prednisolone 12.5 mg qAM / 10 mg qPM, next taper due 12/5 (ordered)  Date AM dose (mg) PM dose (mg)   11/21/21 12.5 10   12/5/21 10 10   1/2/22 10 7.5   1/30/22 7.5 7.5   2/27/22 7.5 5   3/27/22 5 5   4/24/22 5 2.5      Prophylaxis:   - Bactrim for PJP ppx (held 11/2-11/5 d/t BRENNAN)  - Nystatin for oral candidiasis ppx, 6 month course  - See below for serologies and viral ppx:    Donor Recipient Intervention   CMV status Negative Negative None, CMV monthly (due 12/14, neg 11/16)   EBV status Positive Positive None, EBV monthly (due 12/14, neg 11/16)   HSV status N/A Positive S/p acyclovir POD #1-30 (recent infection history pre-txp)      ID: Concern for possible Strongyloides exposure pre-transplant s/p ivermectin x1 dose (9/17).  Donor and recipient cultures NGTD.  S/p IV ceftazidime/vancomycin for 48h per protocol and additional empiric ceftazidime 10/19-10/23 given recurrent fevers as below.  Cryptococcal Ag negative 10/29.  Reports some blood mixed in sputum 12/3.   - Monthly blood Coccidioides Ag x12 months post-transplant per ID (urine and serum negative 11/17), due 12/17  (ordered)  - Bacterial and fungal sputum cultures (ordered)     Recurring fevers, Resolved:  Fevers post-op, Tmax 101.7 POD #1.  Febrile with worsening leukocytosis again 10/25, generally persisting.  LP (10/29), xanthochromic with pleocytosis thought to be appropriate given RBC and WBCs, no ABX recommended per transplant ID and neurology.  S/p empiric meropenem 10/28-11/2.       Klebsiella pneumoniae:  Pseudomonas fluorescens/putida:   LLL cavity: Klebsiella initially noted trach sputum culture 11/10.  Started on ceftriaxone 11/11, transitioned to ertapenem 11/12-11/13, and back to ceftriaxone 11/14.  Bronch culture 11/12 with Klebsiella and Pseudomonas fluorescens/putida (R-meropenem).  Chest CT 11/22 with LLL cavity as above, BAL with Klebsiella pneumoniae.  Histo Ag 11/22, 11/23 and Blast Ag 11/22 negative.  - ABX: ceftazidime (11/23-12/21) and levofloxacin (11/23-12/7) per transplant ID; s/p Zosyn (11/15-11/23)  - Transplant ID follow up scheduled 12/21 with repeat chest CT prior      Disseminated Candida: Noted on blood cultures 10/20 and 10/22.  BDG fungitell positive (399) on 10/20.  Respiratory cultures with persistent Candida albicans.  TC 10/23 without evidence of endocarditis.  Ophthalmology consult 10/24 with benign dilated fundoscopic exam.  Candida empyema also noted 10/25, chest tubes inadvertently removed by CVTS 10/28, left chest tube replaced by IR 11/3 as above with ongoing Candida on cultures (11/3, 11/18).  BDG fungitell positive (>500) and Aspergillus galactomannan negative 11/22.  - Fluconazole (10/26-12/21) per transplant ID, repeat EKG for QTc monitoring 12/7 (ordered); s/p micafungin 10/22-10/27     HSV: Chronic intermittent active infection pre-transplant with recent HSV infection: crusted lesions throughout left side of jaw, s/p 10 day treatment course of ACV through 10/9.  HSV PCR blood negative 10/17.  S/p ACV ppx as above (started POD #1 instead of POD #8 given HSV history and  location).     Hypogammaglobulinemia: IgG previously low at 364 (9/7).  Noted at 265 at time of transplant, s/p IVIG 10/21, repeat IgG (11/17) 198, s/p IVIG (with premedication) 11/18.  - Repeat IgG (12/15, ordered)     Positive cross match:   Positive DSA: Note that he received two doses of rituximab in June, which is likely contributing to cross match result.  DSA had been negative through 11/15 and newly positive 11/29 with DQB5 and mfi 2522.  - DSA monitoring weekly (12/6, ordered)     SALTY: Noted pre-transplant.  Home CPAP 6-12 cm H2O.  - Evaluate need for BiPAP after decannulation.     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  MRI 10/23 with multifocal subacute infarcts within both cerebral hemispheres and left cerebellum.  DDx include surgery v embolic v infectious.  Heparin drip started 10/23 (intermittently held with GI bleed as below).  Repeat stroke code 10/25 d/t marked decrease in responsiveness with sedation wean, pupil inequality, and absent gag reflex.  CTA head without obvious new pathology, MRI brain (with and without contrast) primarily revealing for infarct, low likelihood of PRES.  Ammonia normal.  VEEG per neuro with severe diffuse encephalopathy.  Ongoing improvement since 10/29, repeat head CT 11/2 (following code) without new acute intracranial abnormalities.  - AC management per primary team as below   - PT/OT      Afib with RVR: Noted 10/18, started on amiodarone drip and converted to SR, transitioned to PO 10/21, decreased 10/29.  Metoprolol and amiodarone discontinued 11/19 d/t intermittent bradycardia.  AC per primary team.      Right subclavian DVT: Seen on UE US from 11/4.  Heparin drip resumed 11/5,  transitioned to warfarin 11/20 and in the setting of thrombocytopenia.  HIT negative 11/21.      Recurrent GI bleed, Resolved: Hemoglobin dropped to 6.6 10/22, s/p 2 units pRBC.  OG tube with bloody output, EGD 10/23 noted NJ/OG tube trauma with scant oozing.  Progressive hypotension 11/2, hemoglobin 5.8 with bloody G tube output.  Heparin drip held, transitioned to PPI drip, and MTP activated.  EGD 11/2 with large amount of clotted blood in stomach and area of raised mucosa with small adherent clot near PEG tube site that was clipped, no active bleeding.  Maroon stools 11/3, repeat EGD with extensive old blood in stomach, no active bleeding, small nodular area with prior clips clipped again.  Most recent EGD 11/5 with ulcer noted at PEG tube bumper site, gastritis, and suction marks from G tube. TF noted in G tube drainage bag 11/7, AXR with GJ tube tip projecting over proximal duodenum. GJ tube exchanged 11/9 by GI.      Elevated LFTs, Resolved: Shock liver post-op, now resolved.  Intermittent alk phos elevation, recently normal.     Hypomagnesemia: Suppressed reabsorption 2/2 CNI.  Requiring intermittent IV and PO replacement.  - Mag-Ox 400 mg BID, monitor ability to increase pending stools    We appreciate the excellent care provided by the [place] [id number] team.  Recommendations communicated via in person rounding and this note.  Will continue to follow along closely, please do not hesitate to call with any questions or concerns.    Patient discussed with Dr. Sifuentes.    Keyla Rcie PA-C  Inpatient PRINCESS  Pulmonary CF/Transplant     Subjective & Interval History:     Slept poorly and frustrated by early morning transfer to new room.  Trach capped, on RA.  Breathing feels tight although no significant change over the last couple days.  Cough productive of some blood mixed with sputum this morning.  Received chest physiotherapy once yesterday.  Incisional pain ongoing, IV Dilaudid discontinued yesterday, using  "prn oxycodone.  Appetite remains poor, ate small amount of BID meals yesterday.  Endorses feeling worried and overwhelmed.    Review of Systems:     C: No fever, no chills, no change in weight  INTEGUMENTARY/SKIN: No rash or obvious new lesions  ENT/MOUTH: No sore throat, no sinus pain, no nasal congestion or drainage  RESP: See interval history  CV: No peripheral edema  GI: No nausea, no vomiting, no change in loose stools, no reflux symptoms  : No dysuria  MUSCULOSKELETAL: See interval history  ENDOCRINE: + blood sugars intermittently elevated  NEURO: No headache, no numbness or tingling  PSYCHIATRIC: See interval history    Physical Exam:     Vital signs:  Temp: 97.6  F (36.4  C) Temp src: Oral BP: 133/86 Pulse: 96   Resp: 20 SpO2: 96 % O2 Device: None (Room air) Oxygen Delivery: 2 LPM Height: 162.6 cm (5' 4.02\") Weight: 66.2 kg (145 lb 15.1 oz)  I/O:     Intake/Output Summary (Last 24 hours) at 12/3/2021 1226  Last data filed at 12/3/2021 0900  Gross per 24 hour   Intake 1480 ml   Output 1700 ml   Net -220 ml     Constitutional: Sitting up in bed, in no apparent distress.   HEENT: Eyes with pink conjunctivae, anicteric.  Oral mucosa moist without lesions.  Trach capped.  PULM: Dimiminished air flow to bases bilaterally.  No crackles, no rhonchi, no wheezes.  Mildly labored breathing on RA (just got back from the commode).  CV: Normal S1 and S2.  RRR.  No murmur, gallop, or rub.  No peripheral edema.   ABD: NABS, soft, nontender, nondistended.    MSK: Moves all extremities.  + muscle wasting.   NEURO: Alert, conversant.   SKIN: Warm, dry.  No rash on limited exam.   PSYCH: Calm.     Lines, Drains, and Devices:  PICC Triple Lumen 11/04/21 Left Basilic Access. PICC okay to use. (Active)   Site Assessment WDL 12/03/21 0400   External Cath Length (cm) 1 cm 11/30/21 1600   Extremity Circumference (cm) 28 cm 11/28/21 1038   Dressing Intervention Chlorhexidine patch 12/03/21 0000   Dressing Change Due 12/05/21 " 12/03/21 0400   PICC Comment CDI 12/03/21 0400   Gray - Status heparin locked 12/03/21 0400   [place] - Cap Change Due 12/04/21 12/03/21 0400   Red - Status heparin locked 12/03/21 0400   Red - Cap Change Due 12/04/21 12/03/21 0400   White - Status infusing 12/03/21 0400   White - Cap Change Due 12/04/21 12/03/21 0400   Extravasation? No 12/02/21 2000   Line Necessity Yes, meets criteria 12/03/21 0400   Number of days: 29     Data:     LABS    CMP:   Recent Labs   Lab 12/03/21  1147 12/03/21  0927 12/03/21  0553 12/03/21  0306 12/02/21  0843 12/02/21  0620 12/01/21  0902 12/01/21  0725 11/30/21  1659 11/30/21  1609 11/30/21  0748 11/30/21  0431   NA  --   --  138  --   --  140  --  138  --  137  --  139   POTASSIUM  --   --  4.4  --   --  4.1  --  3.9  --  4.5  --  4.0   CHLORIDE  --   --  105  --   --  104  --  102  --  103  --  103   CO2  --   --  28  --   --  29  --  29  --  30  --  30   ANIONGAP  --   --  5  --   --  7  --  7  --  4  --  6   GLC 72 103* 222* 275*   < > 126*   < > 150*   < > 122*   < > 133*   BUN  --   --  37*  --   --  38*  --  38*  --  45*  --  52*   CR  --   --  0.66  --   --  0.71  --  0.74  --  0.84  --  0.82   GFRESTIMATED  --   --  >90  --   --  >90  --  >90  --  >90  --  >90   ERIK  --   --  8.8  --   --  9.0  --  9.0  --  9.7  --  9.0   MAG  --   --  1.3*  --   --  1.6  --  1.6  --   --   --  1.7   PHOS  --   --  2.4*  --   --  3.2  --  3.3  --   --   --  3.2   PROTTOTAL  --   --  5.6*  --   --  5.6*  --  5.5*  --   --   --  5.4*   ALBUMIN  --   --  2.2*  --   --  2.1*  --  2.2*  --   --   --  2.2*   BILITOTAL  --   --  0.1*  --   --  0.3  --  0.2  --   --   --  0.1*   ALKPHOS  --   --  106  --   --  106  --  103  --   --   --  106   AST  --   --  21  --   --  24  --  26  --   --   --  25   ALT  --   --  27  --   --  28  --  29  --   --   --  27    < > = values in this interval not displayed.     CBC:   Recent Labs   Lab 12/03/21  0553 12/02/21  0620 12/01/21  0725 11/30/21  0431   WBC 10.8  9.3 10.0 10.4   RBC 2.82* 2.86* 2.94* 2.92*   HGB 8.6* 8.7* 9.0* 8.9*   HCT 27.4* 28.2* 28.9* 28.3*   MCV 97 99 98 97   MCH 30.5 30.4 30.6 30.5   MCHC 31.4* 30.9* 31.1* 31.4*   RDW 15.2* 15.3* 15.4* 15.6*   PLT 91* 90* 89* 81*       INR:   Recent Labs   Lab 12/03/21  0553 12/02/21  0620 12/01/21  0725 11/30/21  0431   INR 2.32* 2.15* 1.74* 1.49*       Glucose:   Recent Labs   Lab 12/03/21  1147 12/03/21  0927 12/03/21  0553 12/03/21  0306 12/03/21  0012 12/02/21  2030   GLC 72 103* 222* 275* 223* 155*       Blood Gas:   Recent Labs   Lab 12/01/21  0725   PHV 7.42   PCO2V 50   PO2V 43   HCO3V 32*   LORI 6.6*   O2PER 2       Culture Data No results for input(s): CULT in the last 168 hours.    Virology Data:   Lab Results   Component Value Date    FLUAH1 Negative 11/22/2021    FLUAH3 Negative 11/22/2021    DU1707 Negative 11/22/2021    IFLUB Negative 11/22/2021    RSVA Negative 11/22/2021    RSVB Negative 11/22/2021    PIV1 Negative 11/22/2021    PIV2 Negative 11/22/2021    PIV3 Negative 11/22/2021    HMPV Negative 11/22/2021    HRVS Negative 11/22/2021    ADVBE Negative 11/22/2021    ADVC Negative 11/22/2021    ADVC Negative 11/12/2021    ADVC Negative 10/17/2021       Historical CMV results (last 3 of prior testing):  Lab Results   Component Value Date    CMVQNT Not Detected 11/22/2021    CMVQNT Not Detected 11/16/2021    CMVQNT Not Detected 11/12/2021     No results found for: CMVLOG    Urine Studies    Recent Labs   Lab Test 11/01/21  1336 10/25/21  1507   URINEPH 5.5 5.5   NITRITE Negative Negative   LEUKEST Negative Negative   WBCU 0 2       Most Recent [facility] Pulmonary Function Testing (FVC/FEV1 only)  No results found for: 20002  No results found for: 20003  No results found for: 20015  No results found for: 20016    IMAGING    No results found for this or any previous visit (from the past 48 hour(s)).

## 2021-12-03 NOTE — DISCHARGE INSTRUCTIONS
AFTER YOU GO HOME FROM YOUR HEART SURGERY  ([facility] lung transplant/10/16/2021)    You had a sternotomy, avoid lifting anything greater than ten pounds for 6 weeks after surgery and then less than 20 pounds for an additional 6 weeks. Do not reach backwards or use arms to push out of chair. Do not let people pull on your arms to assist with standing. Avoid twisting or reaching too far across your body.  Avoid strenuous activities such as bowling, vacuuming, raking, shoveling, golf or tennis for 12 weeks after your surgery. It is okay to resume sex if you feel comfortable in doing so. You may have to try different positions with your partner.  Splint your chest incision by hugging a pillow or bringing your arms across your chest when coughing or sneezing. Please try to sleep on your back for the first 4-6 weeks to avoid extra stress on your sternum (breastbone) while it is healing.     Shower or wash your incisions twice daily with soap and water (or as instructed), pat dry. Keep wound clean and dry, showers are okay after discharge, but don't let spray hit directly on incision. No baths or swimming for 1 month. Cover chest tube sites with gauze until they stop draining, then leave open to air. It is not abnormal for chest tube sites to drain yellowish/clear fluid for up to 2-3 weeks after surgery.     Watch for signs of infection: increased redness, tenderness, warmth or any drainage from sternum incision.  Also a temperature > 100.5 F or chills. Call your surgeon or primary care provider's office immediately. Remove any skin glue left on incisions after 10-14 days. This will not affect your incision and can speed up healing.    Exercise is very important in your recovery. Please follow the guidelines set up for you in your cardiac rehab classes at the hospital. If outpatient cardiac rehab was ordered for you, we highly recommend you participate. If you have problems arranging your cardiac rehab, please call  [phone] for all locations, with the exception of [place], please call [phone] and [place], please call [phone].    Avoid sitting for prolonged periods of time, try to walk every hour during the day. If you have a leg incision, elevate your leg often when you are not walking.    Nutrition  Diet: Regular diet as tolerated, or as recommended by your team/doctors. Tube feeding as ordered.   *Post-Transplant Diet Guidelines - Follow recommendations on the Guide to Your Diet after Transplant handout (summary below).    -  Maintain a healthy weight.  -  Eat a heart-healthy diet (low sodium, low saturated and trans-fat) once your appetite improves to normal.  -  Control blood sugar.  -  Limit sodium (salt).  -  Take calcium and vitamin D supplement if your doctor or team orders.  -  Eat more protein for six to eight weeks after transplant.    -  Prevent food poisoning, store and prepare foods to the proper temperature, practice good handwashing, heat all deli meat (to 165 degrees Fahrenheit), avoid raw fish and meats (including uncooked eggs, non-pasteurized or raw milk, and non-pasteurized or raw cheeses including brie, camembert, blue-veined cheese, and Mexican queso fresco), and throw out leftovers older than two days.   -  In some cases (but not in all cases), adjust potassium intake.     Check your weight when you get home from the hospital and continue to check it daily through your recovery for at least a month. If you notice a weight gain of 2-3 pounds in a week, notify your primary care physician, cardiologist or surgeon.    Bowel activity may be slow after surgery. If necessary, you may take an over the counter laxative such as Milk of Magnesia or Miralax. You may have stool softeners prescribed (docusate sodium, Senokot). We recommend using stool softeners while using narcotics for pain (oxycodone/percocet, hydrocodone/vicodin, hydromorphone/dilaudid).      Wean OFF of narcotics (oxycodone,  dilaudid, hydrocodone) as soon as possible. You should continue taking acetaminophen as long as you have any surgical pain as the first choice for pain control and add narcotics as necessary for pain to be tolerable.      DO NOT SMOKE.  IF YOU NEED HELP QUITTING, PLEASE TALK WITH YOUR CARDIOLOGIST OR PRIMARY DOCTOR.    REGARDING PRESCRIPTION REFILLS.  If you need a refill on your pain medication contact us to discuss your pain and a possible one time refill.   All other medications will be adjusted, discontinued and re-filled by your primary care physician and/or your cardiologist as they were prior to your surgery. We have given you enough for one to three month with possibly one refill.    POST-OPERATIVE CLINIC VISITS  Your follow up appointments will be scheduled by your transplant coordinator.   If there is a need to return to see CT Surgery, please call our  Lora Cool at [phone].     SURGICAL QUESTIONS  Please call Paulo Cardona, Tammie Bishop, or Tammie Holt with surgical recovery and medication questions, their phone numbers are listed below.  They will assist you with your needs and contact other surgery care team members as indicated.    On weekends or after hours, please call [phone] and ask the  to page the Cardiothoracic Surgery fellow on call.      Thank you,    Your Cardiothoracic Surgery Team  Paulo Cardona RN Care Coordinator-  [phone]   Tammie Bishop RN Care Coordinator-  [phone]  Tammie Holt RN Care Coordinator- [phone]

## 2021-12-03 NOTE — PROGRESS NOTES
Pt is alert and oriented x 4. On RA with O2 sat >94%. Lungs sound clear anteriorly and diminished on the bases. Trach in place, capped and foam dressing CDI. SR to ST. Afebrile. BP stable. BG covered with sliding scale. Tube feeding infusing and tolerated.  Pt being moved to 6C. Report given to TIFFANY Pastrana.

## 2021-12-03 NOTE — PROGRESS NOTES
CLINICAL NUTRITION SERVICES - DISCHARGE NOTE    Patient s discharge needs assessed and discharge planning has been conducted with the multidisciplinary transplant care team including physicians, pharmacy, social work and transplant coordinator.    Follow up/Monitoring:  Once discharged, place outpatient nutrition consult via the transplant team if nutrition concerns arise.     Inessa Patel, MS, RD, LD, [facility]   6C Pgr: [phone]

## 2021-12-03 NOTE — PLAN OF CARE
Vitals stable, denies significant pain this shift.  Patient continues to be severely SOB with activity but maintains SpO2 sats above 92% on room air.   Mg 1.3 replaced per RN managed protocol. Next check with AM labs 12/4.    Start calorie counts this week 12/5-12/7. Patient continues to have no appetite.   Continues to have

## 2021-12-03 NOTE — PROGRESS NOTES
Thoracic surgery brief note:    Doing well. Shelton site c/d/i.  Peg-j site c/d/i. TF running. Shelton currently capped - off supplemental O2.      Eduardo Alford  General Surgery PGY1     December 3, 2021  4:44 PM

## 2021-12-03 NOTE — PROGRESS NOTES
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 Post-op course complicated encephalopathy, and diffuse weakness, acute to subacute by CVA, afib with RVR, BRENNAN, candidemia/candida empyema, and Code Blue due to bradycardia/asystole and hypotension for significant GIB s/p EGD with adherent clot near PEG tube site s/p clips. Transferred from ICU to medicine on 11/23/2021. Weaned off ventilator and chest tubes now removed. Appears ready for discharge to ARU.      ILD and NSIP s/p BSLT on 10/16/2021, POD 38  Acute hypoxic respiratory failure s/p tracheostomy on 10/29  Bilateral pleural effusions   - Transplant pulmonology following, appreciate recs   - IS: s/p basiliximab on 10/16 and 10/20. Continue tacrolimus 2 mg QAM and 2.5 mg QPM (goal 8-12), MMF 1000 mg BID, and prednisone 12.5 mg QAM and 10 mg QPM  - Ppx: Continue bactrim 400-80 mg daily, nystatin QID x6 months  - Monthly Coccidioides no longer suggested by ID  - DSA every 2 weeks   - Levalbuterol and mucomyst QID and pulm toilet and chest PT QID  - PT/OT/SLP   - TD with cuff down and PMSV during the day, capping trial as long as tolerated  - no longer requiring diuresis   - Oxycodone 5-10 mg Q4H   - Discussed trying to use oxy Q4H instead of IV and patient agreeable   - Schedule tylenol     L lung cavitary lesion   suspect aspiration vs pseudomonal vs K pneumonia induced abscess. CT on 10/25 with LLL nodular opacity. Repeat CT chest on 11/22 with new cavitary lesion in the  left lung base, and with multifocal bilateral upper lobe GGO (some of which are new), new 1.8 cm fluid collection with surrounding fat stranding in the left axilla, decreased bilateral loculated pleural effusions. Indeterminate Quant Gold, but negative tuberculin skin tests on mulitple occurences. S/p BAL on 11/22. ID feels less likely fungal. B D glucan positive but has known candidemia. Cocci ag in urine negatie, serum histo negative, CRAG negative  - Transplant ID following   - ID recommends stopping zosyn (11/15-11/23), start Ceftazdime 2 grams Q8H (until 12/21) and levaquin 750 mg daily  (until 12/07)  - Follow up CT chest in 4 weeks 12/21  - Please include in the patient's discharge instructions, follow up with Dr. Abebe on 12/21/2021 @6:30 PM in a virtual visit.   - No indicated labs outside of post-transplant labs.     Klebsiella pneumoniae and Pseudomonas fluorescens/putida HAP  Klebsiella initially noted trach sputum culture 11/10. Bronch culture 11/12 with Klebsiella and Pseudomonas fluorescens/putida (R-meropenem).     - Abx as above.       Disseminated Candida   + candida BCx 10/20 and 10/22.  BDG fungitell positive (399) on 10/20. Sputum Cx + Candida albicans persistently.  TC 10/23 without evidence of endocarditis. Ophthalmology consult 10/24 with benign dilated fundoscopic exam.  Candida empyema also noted 10/25, chest tubes inadvertently removed by CVTS 10/28, left chest tube replaced by IR 11/3 as above with ongoing Candida on cultures. Repeat pleural fungal cultures and fungal/bacterial blood cultures on 11/18 NGTD. Transplant as noted above following   - L chest tube removed 12/1  - Initially on Micafungin (10/22-10/27) transition to fluconazole 400 mg IV daily (start date 10/26 end date 12/21)  - ID recommends evacuate any remaining R and/or left loculated effusions      Hypogammaglobinemia  s/p IVIG with plan to repeat IgG on 12/15     Encephalopathy, resolved   likely multi-factorial.  Multiple brain imaging with stroke as noted below, but negative for PRESS. Ammonia negative. vEEG with severe diffuse encephalopathy. LP as noted below negative for infection. Neurology previously following.   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 MRI brain on 10/23 with multifocal subacute infarct within both cerebral hemispheres and left cerebellum. Presumed embolic with afib hx.   - Anticoagulation as noted below      Afib with hx of RVR - [id number] for HTN, CVA, and DM2. First noted on 10/18, started on amiodarone drip, and converted to NSR. Metoprolol and amidoarone discontinued on 11/20 d/t intermittent bradycardia.   - Anticoagulation with warfarin as noted below  - Continue Rosuvastatin 10 mg daily      R subclavian DVT   dx on 11/4. Resumed on heparin drip on 11/5 and transitioned to warfarin in setting of thrombocytopenia. HIT panel negative on 11/21.   - Continue warfarin per pharmacy protocol (held on 11/26 for thora) will restart.       DM2 with steroid induced hyperglycemia   Hemoglobin A1c 6.6 pre-operatively. Endocrine recently consulted for steroids induced hyperglycemia.   - Continue Lantus 30 mg BID  - Novolog High Sliding scale insulin Q4H  - BG Q4H      Hypomagnesemia   Likely 2/2 suppressed reabsorption from CNI.   - Sliding scale replacement protocol       Diarrhea   C. Diff negative on 11/20. Rectal tube in place on 11/22. Possibly due to tube feeds or magnesium supplementation.   - Monitor I&O      Malnutrition   S/p PEGJ  - MVI, folic acid, B6, B12 supplements   - TF per nutrition      Concern for chipped tooth  Poor dental hygiene  Patient noted he feels like he may have chipped his tooth or has a loose tooth after eating guevara. It is not painful.   - Dental hygiene consult placed  ------ Chronic stable  medical problems ------     RA- Dx 5/2021 with + CCP ab and RF. Previously treated with rituximab. Rheum consulted early in admission. On steroids as noted above.   SALTY - Uses CPAP at bedtime prior to admission. Will need to evaluate for BiPAP after decannulation   HTN- Continue PTA amlodipine 5 mg daily. PRN labetalol and hydralazine for goal SBP <140   Anxiety and depression - Continue PTA lexapro 5 mg daily   Hypothyroidism - TSH 3.17 on 11/19/21. Continue PTA levothyroxine 25 mcg daily      ------ Resolved Hospital problems -------     Recurrent GIB - hgb drop on 10/22 s/p 2 unit pRBC transfusion with EGD on 10/23 with NJ/OG tube trauma with scant oozing. Patient then developed progressive hypotension and ultimately CODE BLUE for GIB in setting of anticoagulation with heparin drip, s/p MTP. EGD on 11/2 with large amount of clotted blood in stomach and area of raised mucosa with small adherent clot near PEG tube site that was clipped, no active bleeding.  Maroon stools 11/3, repeat EGD with extensive old blood in stomach, no active bleeding, small nodular area with prior clips clipped again.  Most recent EGD 11/5 with ulcer noted at PEG tube bumper site, gastritis, and suction marks from G tube. TF noted in G tube drainage bag 11/7, AXR with GJ tube tip projecting over proximal duodenum. GJ tube exchanged 11/9 by GI.  - PO PPI BID      Transaminitis - elevated LFTs post-op, thought to be due to shock liver      BRENNAN - post operative BRENNAN, Cr peaked at 2.05, with renal function back at baseline with Cr at 0.7     Recurring fevers - Fevers post-op, Tmax 101.7 POD #1.  Febrile with worsening leukocytosis again 10/25, generally persisting.  LP (10/29), xanthochromic with pleocytosis thought to be appropriate given RBC and WBCs, no ABX recommended per transplant ID and neurology.  S/p empiric meropenem 10/28-11/2.  Now afebrile, ABX as above.     HSV-  Chronic intermittent active infection pre-transplant with recent HSV  infection: crusted lesions throughout left side of jaw, s/p 10 day treatment course of ACV through 10/9.  HSV PCR blood negative 10/17.  S/p ACV ppx as above (started POD #1 instead of POD #8 given HSV history and location).     Hypernatremia, resolved  Due to inability to have oral intake. Resolved now that he is PO and able to access water.       Diet: Regular Diet Adult  Adult Formula Drip Feeding: Continuous Vital 1.5; Jejunostomy; Goal Rate: 80; mL/hr; From: 4:00 PM; 8:00 AM; Medication - Feeding Tube Flush Frequency: See free water flush order. At least 15-30 mL water before and after medication administration ...  Calorie Counts  Snacks/Supplements Adult: Ensure Clear; Between Meals    DVT Prophylaxis: Heparin SQ  Watson Catheter: Not present  Central Lines: None  Code Status: Full Code      Disposition Plan   Expected discharge: medically ready to discharge to TCU recommended to transitional care unit once antibiotic plan established.     The patient's care was discussed with the Patient and Pulm Consultant.    Gerardo Mahan DO  Hospitalist Service  [facility]  Securely message with the Vocera Web Console (learn more here)  Text page via [facility] Paging/Directory    Please see sign in/sign out for up to date coverage information    Clinically Significant Risk Factors Present on Admission                ______________________________________________________________________    Interval History   Patient seen and examined. No acute events overnight. He moved rooms overnight and was woken up and has not slept since then so he is upset about that. He had fallen asleep ok prior to moving. Otherwise he thinks his pain is about the same, no increased dyspnea but does endorse a little pink tinged sputum, no LE edema.    Four point ROS completed and negative unless listed above    Data reviewed today: I reviewed all medications, new labs and imaging results over the last 24  hours. I personally reviewed no images or EKG's today.    Physical Exam   Vital Signs: Temp: 97.6  F (36.4  C) Temp src: Oral BP: 133/86 Pulse: 96   Resp: 20 SpO2: 96 % O2 Device: None (Room air)    Weight: 145 lbs 15.11 oz  General Appearance: NAD  Respiratory: CTA b/l  Cardiovascular: RRR S1/S2, no m,r,g  GI: soft, NT, ND, +BS  Skin: no rashes  Other: No edema     Data   Recent Labs   Lab 12/03/21  1147 12/03/21  0927 12/03/21  0553 12/02/21  0843 12/02/21  0620 12/01/21  0902 12/01/21  0725   WBC  --   --  10.8  --  9.3  --  10.0   HGB  --   --  8.6*  --  8.7*  --  9.0*   MCV  --   --  97  --  99  --  98   PLT  --   --  91*  --  90*  --  89*   INR  --   --  2.32*  --  2.15*  --  1.74*   NA  --   --  138  --  140  --  138   POTASSIUM  --   --  4.4  --  4.1  --  3.9   CHLORIDE  --   --  105  --  104  --  102   CO2  --   --  28  --  29  --  29   BUN  --   --  37*  --  38*  --  38*   CR  --   --  0.66  --  0.71  --  0.74   ANIONGAP  --   --  5  --  7  --  7   ERIK  --   --  8.8  --  9.0  --  9.0   GLC 72 103* 222*   < > 126*   < > 150*   ALBUMIN  --   --  2.2*  --  2.1*  --  2.2*   PROTTOTAL  --   --  5.6*  --  5.6*  --  5.5*   BILITOTAL  --   --  0.1*  --  0.3  --  0.2   ALKPHOS  --   --  106  --  106  --  103   ALT  --   --  27  --  28  --  29   AST  --   --  21  --  24  --  26    < > = values in this interval not displayed.     Recent Results (from the past 24 hour(s))   XR Chest 2 Views    Narrative    Exam: XR CHEST 2 VW, 12/3/2021 11:32 AM    Indication: interval follow up, h/o lung transplant, effusions    Comparison: Chest x-ray 12/1/2021    Findings:   PA and lateral views of the chest. Tracheostomy tube projects over the  midthoracic trachea. Stable left upper extremity PICC. Postsurgical  changes of chest with stable clamshell sternotomy wires and an  surgical clips.    Trachea is midline. Stable cardiomediastinal silhouette. No  appreciable pneumothorax. Stable blunting of bilateral costophrenic  angles.  Streaky bibasilar opacities. Loculated left-sided pleural  effusion. No focal airspace opacity. Old right clavicular fracture. No  acute osseous abnormalities.      Impression    Impression:   1. Postsurgical changes of bilateral lung transplant.  2. Moderate left-sided loculated pleural effusion. Stable small  right-sided pleural effusion.  3. Bibasilar atelectasis.     Medications     dextrose Stopped (10/24/21 1008)     Warfarin Therapy Reminder         acetaminophen  975 mg Oral or Feeding Tube Q8H     acetylcysteine  2 mL Nebulization BID     amLODIPine  5 mg Per G Tube Daily     calcium carbonate 600 mg-vitamin D 400 units  1 tablet Oral or Feeding Tube BID w/meals     cefTAZidime  2 g Intravenous Q8H     cyclobenzaprine  10 mg Oral At Bedtime     escitalopram  5 mg Oral or Feeding Tube Daily     fiber modular (NUTRISOURCE FIBER)  1 packet Per Feeding Tube BID     fluconazole  400 mg Intravenous Q24H     folic acid-vit B6-vit B12  1 tablet Per G Tube Daily     furosemide  40 mg Oral Daily     heparin lock flush  5-20 mL Intracatheter Q24H     insulin aspart  1-12 Units Subcutaneous Q4H     insulin glargine  30 Units Subcutaneous BID     levalbuterol  1.25 mg Nebulization BID     levofloxacin  750 mg Intravenous Q24H     levothyroxine  25 mcg Oral Daily     lidocaine  2 patch Transdermal Q24H     lidocaine   Transdermal Q8H     magnesium oxide  400 mg Oral BID     melatonin  10 mg Oral QPM     menthol   Transdermal Q8H     multivitamins w/minerals  15 mL Per Feeding Tube Daily     mycophenolate  1,000 mg Per J Tube BID     nystatin  1,000,000 Units Swish & Swallow 4x Daily     pantoprazole  40 mg Per Feeding Tube BID     sodium phosphate  9 mmol Intravenous Once     [START ON 12/5/2021] predniSONE  10 mg Oral or Feeding Tube BID     predniSONE  10 mg Per J Tube QPM     predniSONE  12.5 mg Per J Tube Daily     QUEtiapine  25 mg Oral or Feeding Tube BID     QUEtiapine  50 mg Oral At Bedtime     rosuvastatin  10  mg Oral or Feeding Tube Daily     sodium chloride (PF)  10-40 mL Intracatheter Q8H     sodium chloride (PF)  3 mL Intracatheter Q8H     sulfamethoxazole-trimethoprim  1 tablet Oral or Feeding Tube Daily     tacrolimus  2 mg Oral QPM     tacrolimus  2.5 mg Oral QAM     warfarin ANTICOAGULANT  1 mg Oral ONCE at 18:00

## 2021-12-03 NOTE — PROGRESS NOTES
Calorie Count  Intake recorded for: 12/2  Total Kcals: 0 Total Protein: 0g  Kcals from Hospital Food: 0   Protein: 0g  Kcals from Outside Food (average):0 Protein: 0g  # Meals Ordered from Kitchen: 1 meal  # Meals Recorded: No food intake recorded  # Supplements Recorded: No food intake recorded    Per Epic Food Report, Pt consumed 75% at 12PM but there is not enough information to calculate calories/protein.

## 2021-12-03 NOTE — CONSULTS
"          Initial Psychiatric Consult   Consult date: December 3, 2021         Reason for Consult, requesting source:    Depression   Requesting source: Gerardo Kalli    Labs and imaging reviewed. Collateral information by his wife who was present.         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     Has had additional complications, but now close to being ready for TCU.     He is seen today for psychiatric consultation due to the primary team's concern that he is depressed.  He is indeed feeling depressed and anxious.  He says he has been depressed for many years for which she has been taking Lexapro.  He still has a depressed mood, loss of interest usual activities, poor appetite and sleep difficulties.  He is not feeling hopeless or suicidal, but is very discouraged and does not see much progress with his health.        Past Psychiatric History:   No psychiatric hospitalizations.  He can remember taking Celexa and Viibryd and he has been seeing a psychotherapist long-term.  They have been in touch by phone during his hospitalization.        Substance Use and History:   He ran problems with alcohol use many years ago and so quit on his own.  No drugs or tobacco        Past Medical History:   PAST MEDICAL HISTORY:   Past Medical History:   Diagnosis Date     BRENNAN (acute kidney injury) (H) 10/17/2021     Anxiety      Depression      HLD (hyperlipidemia)      HTN (hypertension)      Hypothyroidism      ILD (interstitial lung disease) (H)      SALTY on CPAP      Oxygen dependent     BL 4L since ~6/2021     Rheumatoid arthritis (H)     signs ~5/2020, dx " 5/2021     S/P lung transplant (H) 10/16/2021     Shock liver 10/17/2021     Steroid-induced hyperglycemia      Traction bronchiectasis (H)        PAST SURGICAL HISTORY:   Past Surgical History:   Procedure Laterality Date     COLONOSCOPY W/ BIOPSIES AND POLYPECTOMY  07/21/2020     CV CORONARY ANGIOGRAM N/A 09/08/2021    Procedure: Coronary Angiogram with possible intervention;  Surgeon: Jovon Bullock MD;  Location:  [facility]     CV RIGHT HEART CATH MEASUREMENTS RECORDED N/A 09/08/2021    Procedure: Right Heart Cath;  Surgeon: Jovon Bullock MD;  Location:  [facility]     ESOPHAGOSCOPY, GASTROSCOPY, DUODENOSCOPY (EGD), COMBINED N/A 10/23/2021    Procedure: ESOPHAGOGASTRODUODENOSCOPY (EGD);  Surgeon: Miquel Pisano MD;  Location: [facility]     ESOPHAGOSCOPY, GASTROSCOPY, DUODENOSCOPY (EGD), COMBINED N/A 11/02/2021    Procedure: ESOPHAGOGASTRODUODENOSCOPY (EGD);  Surgeon: Daniel Ortiz MD;  Location: [facility]     ESOPHAGOSCOPY, GASTROSCOPY, DUODENOSCOPY (EGD), COMBINED N/A 11/5/2021    Procedure: ESOPHAGOGASTRODUODENOSCOPY (EGD);  Surgeon: Ronnell Hernandez MD;  Location: [facility]     EXTRACTION(S) DENTAL N/A 09/22/2021    Procedure: EXTRACTION tooth #19;  Surgeon: Deepak Tobin DDS;  Location: [facility]     HERNIA REPAIR       IR CHEST TUBE PLACEMENT NON-TUNNELLED LEFT  11/03/2021     PICC TRIPLE LUMEN PLACEMENT Left 11/04/2021    5FR TL PICC. Right non occlusive thrombus subclavian vein.     REPLACE GASTROJEJUNOSTOMY TUBE, PERCUTANEOUS N/A 11/9/2021    Procedure: REPLACEMENT, GASTROJEJUNOSTOMY TUBE, PERCUTANEOUS;  Surgeon: Hernando Rodriguez MD;  Location: [facility]     right acl       TRACHEOSTOMY PERCUTANEOUS N/A 10/29/2021    Procedure: Percutaneous Tracheostomy,;  Surgeon: Celine Jenkins MD;  Location: [facility]     TRANSPLANT LUNG RECIPIENT SINGLE X2 Bilateral 10/16/2021    Procedure: TRANSPLANT, LUNG, RECIPIENT, BILATERAL, Bronchoscopy, on-pump  perfusion, bilateral clamshell sternotomy;  Surgeon: Yanick Corral MD;  Location: [facility]     XR ACROMIOCLAVICULAR JOINT BILATERAL               Family History:   FAMILY HISTORY:   Family History   Problem Relation Age of Onset     Diabetes Type 1 Mother      Heart Disease Mother      Chronic Obstructive Pulmonary Disease Mother      Rheumatoid Arthritis Father      Emphysema Paternal Grandfather    Depression runs in the family, he is not aware of substance use problems        Social History:   DC moved a lot due to his father being in the  and he did serve in the [employer] after graduating high school.  He has worked a variety of jobs over the years.  He was  in 2011 and his wife brought 3 children in the relationship.         Physical ROS:   The 10 point Review of Systems is negative other than noted in the HPI or here.           Medications:     Current Facility-Administered Medications   Medication     acetaminophen (TYLENOL) tablet 975 mg     acetylcysteine (MUCOMYST) 20 % nebulizer solution 2 mL     albuterol (PROVENTIL) neb solution 2.5 mg     amLODIPine (NORVASC) tablet 5 mg     bisacodyl (DULCOLAX) Suppository 10 mg     calcium carbonate 600 mg-vitamin D 400 units (CALTRATE) per tablet 1 tablet     cefTAZidime (FORTAZ) 2 g vial to attach to  ml bag for ADULTS or NS 50 ml bag for PEDS     cyclobenzaprine (FLEXERIL) tablet 10 mg     dextrose 10% infusion     glucose gel 15-30 g    Or     dextrose 50 % injection 25-50 mL    Or     glucagon injection 1 mg     escitalopram (LEXAPRO) tablet 5 mg     fiber modular (NUTRISOURCE FIBER) packet 1 packet     fluconazole (DIFLUCAN) intermittent infusion 400 mg in NaCl     folic acid-vit B6-vit B12 (FOLGARD) per tablet 1 tablet     furosemide (LASIX) tablet 40 mg     heparin lock flush 10 UNIT/ML injection 5-20 mL     heparin lock flush 10 UNIT/ML injection 5-20 mL     hydrALAZINE (APRESOLINE) injection 20 mg     hydrOXYzine (ATARAX) tablet 25 mg     Or     hydrOXYzine (ATARAX) tablet 50 mg     insulin aspart (NovoLOG) injection (RAPID ACTING)     insulin glargine (LANTUS PEN) injection 30 Units     labetalol (NORMODYNE/TRANDATE) injection 20 mg     levalbuterol (XOPENEX) neb solution 1.25 mg     levofloxacin (LEVAQUIN) infusion 750 mg     levothyroxine (SYNTHROID/LEVOTHROID) tablet 25 mcg     Lidocaine (LIDOCARE) 4 % Patch 2 patch     lidocaine (XYLOCAINE) 2 % solution 5 mL     lidocaine patch in PLACE     magnesium hydroxide (MILK OF MAGNESIA) suspension 30 mL     magnesium oxide (MAG-OX) tablet 400 mg     melatonin tablet 10 mg     menthol (ICY HOT) 5 % patch 1 patch    And     menthol (ICY HOT) Patch in Place     multivitamins w/minerals liquid 15 mL     mycophenolate (CELLCEPT BRAND) suspension 1,000 mg     naloxone (NARCAN) injection 0.2 mg    Or     naloxone (NARCAN) injection 0.4 mg    Or     naloxone (NARCAN) injection 0.2 mg    Or     naloxone (NARCAN) injection 0.4 mg     nystatin (MYCOSTATIN) suspension 1,000,000 Units     ondansetron (ZOFRAN-ODT) ODT tab 4 mg    Or     ondansetron (ZOFRAN) injection 4 mg     oxyCODONE (ROXICODONE) tablet 5-10 mg     oxymetazoline (AFRIN) 0.05 % spray 2 spray     pantoprazole (PROTONIX) 2 mg/mL suspension 40 mg     Potassium Phosphate 9 mMol/250 mL intermittent infusion 9 mmol     [START ON 12/5/2021] predniSONE (DELTASONE) tablet 10 mg     predniSONE (DELTASONE) tablet 10 mg     predniSONE (DELTASONE) tablet 12.5 mg     prochlorperazine (COMPAZINE) injection 10 mg    Or     prochlorperazine (COMPAZINE) tablet 10 mg     QUEtiapine (SEROquel) tablet 25 mg     QUEtiapine (SEROquel) tablet 25 mg     QUEtiapine (SEROquel) tablet 50 mg     rosuvastatin (CRESTOR) tablet 10 mg     senna-docusate (SENOKOT-S/PERICOLACE) 8.6-50 MG per tablet 1 tablet     sodium chloride (PF) 0.9% PF flush 10-20 mL     sodium chloride (PF) 0.9% PF flush 10-40 mL     sodium chloride (PF) 0.9% PF flush 3 mL     sodium chloride (PF) 0.9% PF  flush 3 mL     sulfamethoxazole-trimethoprim (BACTRIM) 400-80 MG per tablet 1 tablet     tacrolimus (GENERIC EQUIVALENT) suspension 2 mg     tacrolimus (GENERIC EQUIVALENT) suspension 2.5 mg     warfarin ANTICOAGULANT (COUMADIN) tablet 1 mg     Warfarin Therapy Reminder (Check START DATE - warfarin may be starting in the FUTURE)     zinc-white petrolatum (ILEX) 58.3 % paste              Allergies:   No Known Allergies       Labs:     Recent Results (from the past 48 hour(s))   Glucose by meter    Collection Time: 12/01/21  3:59 PM   Result Value Ref Range    GLUCOSE BY METER POCT 114 (H) 70 - 99 mg/dL   Glucose by meter    Collection Time: 12/01/21  7:44 PM   Result Value Ref Range    GLUCOSE BY METER POCT 209 (H) 70 - 99 mg/dL   Glucose by meter    Collection Time: 12/02/21 12:53 AM   Result Value Ref Range    GLUCOSE BY METER POCT 194 (H) 70 - 99 mg/dL   Glucose by meter    Collection Time: 12/02/21  5:22 AM   Result Value Ref Range    GLUCOSE BY METER POCT 99 70 - 99 mg/dL   CBC with platelets    Collection Time: 12/02/21  6:20 AM   Result Value Ref Range    WBC Count 9.3 4.0 - 11.0 10e3/uL    RBC Count 2.86 (L) 4.40 - 5.90 10e6/uL    Hemoglobin 8.7 (L) 13.3 - 17.7 g/dL    Hematocrit 28.2 (L) 40.0 - 53.0 %    MCV 99 78 - 100 fL    MCH 30.4 26.5 - 33.0 pg    MCHC 30.9 (L) 31.5 - 36.5 g/dL    RDW 15.3 (H) 10.0 - 15.0 %    Platelet Count 90 (L) 150 - 450 10e3/uL   Comprehensive metabolic panel    Collection Time: 12/02/21  6:20 AM   Result Value Ref Range    Sodium 140 133 - 144 mmol/L    Potassium 4.1 3.4 - 5.3 mmol/L    Chloride 104 94 - 109 mmol/L    Carbon Dioxide (CO2) 29 20 - 32 mmol/L    Anion Gap 7 3 - 14 mmol/L    Urea Nitrogen 38 (H) 7 - 30 mg/dL    Creatinine 0.71 0.66 - 1.25 mg/dL    Calcium 9.0 8.5 - 10.1 mg/dL    Glucose 126 (H) 70 - 99 mg/dL    Alkaline Phosphatase 106 40 - 150 U/L    AST 24 0 - 45 U/L    ALT 28 0 - 70 U/L    Protein Total 5.6 (L) 6.8 - 8.8 g/dL    Albumin 2.1 (L) 3.4 - 5.0 g/dL     Bilirubin Total 0.3 0.2 - 1.3 mg/dL    GFR Estimate >90 >60 mL/min/1.73m2   INR    Collection Time: 12/02/21  6:20 AM   Result Value Ref Range    INR 2.15 (H) 0.85 - 1.15   Magnesium    Collection Time: 12/02/21  6:20 AM   Result Value Ref Range    Magnesium 1.6 1.6 - 2.3 mg/dL   Phosphorus    Collection Time: 12/02/21  6:20 AM   Result Value Ref Range    Phosphorus 3.2 2.5 - 4.5 mg/dL   Tacrolimus by Tandem Mass Spectrometry    Collection Time: 12/02/21  6:20 AM   Result Value Ref Range    Tacrolimus by Tandem Mass Spectrometry 9.1 5.0 - 15.0 ug/L    Tacrolimus Last Dose Date      Tacrolimus Last Dose Time     Glucose by meter    Collection Time: 12/02/21  8:43 AM   Result Value Ref Range    GLUCOSE BY METER POCT 151 (H) 70 - 99 mg/dL   Glucose by meter    Collection Time: 12/02/21  1:05 PM   Result Value Ref Range    GLUCOSE BY METER POCT 125 (H) 70 - 99 mg/dL   Glucose by meter    Collection Time: 12/02/21  4:18 PM   Result Value Ref Range    GLUCOSE BY METER POCT 123 (H) 70 - 99 mg/dL   Lactic Acid STAT    Collection Time: 12/02/21  6:16 PM   Result Value Ref Range    Lactic Acid 1.7 0.7 - 2.0 mmol/L   Glucose by meter    Collection Time: 12/02/21  8:30 PM   Result Value Ref Range    GLUCOSE BY METER POCT 155 (H) 70 - 99 mg/dL   Glucose by meter    Collection Time: 12/03/21 12:12 AM   Result Value Ref Range    GLUCOSE BY METER POCT 223 (H) 70 - 99 mg/dL   Glucose by meter    Collection Time: 12/03/21  3:06 AM   Result Value Ref Range    GLUCOSE BY METER POCT 275 (H) 70 - 99 mg/dL   CBC with platelets    Collection Time: 12/03/21  5:53 AM   Result Value Ref Range    WBC Count 10.8 4.0 - 11.0 10e3/uL    RBC Count 2.82 (L) 4.40 - 5.90 10e6/uL    Hemoglobin 8.6 (L) 13.3 - 17.7 g/dL    Hematocrit 27.4 (L) 40.0 - 53.0 %    MCV 97 78 - 100 fL    MCH 30.5 26.5 - 33.0 pg    MCHC 31.4 (L) 31.5 - 36.5 g/dL    RDW 15.2 (H) 10.0 - 15.0 %    Platelet Count 91 (L) 150 - 450 10e3/uL   Comprehensive metabolic panel    Collection  "Time: 12/03/21  5:53 AM   Result Value Ref Range    Sodium 138 133 - 144 mmol/L    Potassium 4.4 3.4 - 5.3 mmol/L    Chloride 105 94 - 109 mmol/L    Carbon Dioxide (CO2) 28 20 - 32 mmol/L    Anion Gap 5 3 - 14 mmol/L    Urea Nitrogen 37 (H) 7 - 30 mg/dL    Creatinine 0.66 0.66 - 1.25 mg/dL    Calcium 8.8 8.5 - 10.1 mg/dL    Glucose 222 (H) 70 - 99 mg/dL    Alkaline Phosphatase 106 40 - 150 U/L    AST 21 0 - 45 U/L    ALT 27 0 - 70 U/L    Protein Total 5.6 (L) 6.8 - 8.8 g/dL    Albumin 2.2 (L) 3.4 - 5.0 g/dL    Bilirubin Total 0.1 (L) 0.2 - 1.3 mg/dL    GFR Estimate >90 >60 mL/min/1.73m2   INR    Collection Time: 12/03/21  5:53 AM   Result Value Ref Range    INR 2.32 (H) 0.85 - 1.15   Magnesium    Collection Time: 12/03/21  5:53 AM   Result Value Ref Range    Magnesium 1.3 (L) 1.6 - 2.3 mg/dL   Phosphorus    Collection Time: 12/03/21  5:53 AM   Result Value Ref Range    Phosphorus 2.4 (L) 2.5 - 4.5 mg/dL   Glucose by meter    Collection Time: 12/03/21  9:27 AM   Result Value Ref Range    GLUCOSE BY METER POCT 103 (H) 70 - 99 mg/dL   Glucose by meter    Collection Time: 12/03/21 11:47 AM   Result Value Ref Range    GLUCOSE BY METER POCT 72 70 - 99 mg/dL          Physical and Psychiatric Examination:     /86   Pulse 96   Temp 97.6  F (36.4  C) (Oral)   Resp 20   Ht 1.626 m (5' 4.02\")   Wt 66.2 kg (145 lb 15.1 oz)   SpO2 96%   BMI 25.04 kg/m    Weight is 145 lbs 15.11 oz  Body mass index is 25.04 kg/m .    Physical Exam:  I have reviewed the physical exam as documented by by the medical team and agree with findings and assessment and have no additional findings to add at this time.    Mental Status Exam:  Sitting up in the hospital bed, is well groomed, pleasant, cooperative, but irritable. Speech fluent. Moves upper extremities without difficulty. Associations tight. Mood is \"down.\"  Affect quite restricted, irritable. Thought process logical, linear. Thought content negative for suicidal thoughts or " "delusions. Orientation, recent and remote memory, attention span and concentration are not formally assessed (my interview was cut short). Fund of knowledge, use of language appropriate. Insight and judgment fair.               DSM-5 Diagnosis:   296.32 (F33.1) Major Depressive Disorder, Recurrent Episode, Moderate _ and With anxious distress  300.01 (F41.0) Panic Disorder  300.02 (F41.1) Generalized Anxiety Disorder   Alcohol use disorder, in remission for < 20 years          Assessment:   He is clearly depressed and also having quite a bit of anxiety.  He is discouraged by his slow recovery from surgery and the associated complications.  The dose of Lexapro he was taking prior to discharge and currently is too low.  He is getting some psychotherapy by phone he mentions that seeing one of our psychologist in the hospital may be helpful.          Summary of Recommendations:   I would increase Lexapro to 10 mg/day and in a week or so consider increasing to 20 mg.  If there is no cardiac contraindications he is the sort of patient that may benefit from 2 or 3 weeks of stimulants such as Ritalin 5 mg in the morning and early afternoon, increasing to 10 mg per dose as tolerated.  I would order a health psychology consult  Page me or re-consult psychiatry as needed.     Sundar Lazar M.D.   [facility]   Contact information available via [facility] Paging/Directory.  If I am not available, then [facility] intake ([phone]) should know who   Is on call          \"This dictation was performed with voice recognition software and may contain errors,  omissions and inadvertent word substitution.\"           "

## 2021-12-03 NOTE — PROGRESS NOTES
"CLINICAL NUTRITION SERVICES     Nutrition Prescription    RECOMMENDATIONS FOR MDs/PROVIDERS TO ORDER:  1. Continue TFs, as modified today (12/3), to help meet pt's increased needs. Adjust as needed, pending kcal counts.    2. Adjust free water flushes via feeding tube as needed, pending fluid status.    3. Rec additional vitamin D supplementation if lab is low (lab ordered for 12/4)    Malnutrition Status:    See prior RD notes    Recommendations already ordered by Registered Dietitian (RD):  Modified TF formula  Kcal counts 12/5-12/7  Oral supplements    Vitamin D lab      Future/Additional Recommendations:  See prior RD notes  Monitor K+ trends  Continue calcium/vitamin D twice daily and multivitamin/minerals.  Continue folgard as per team.      Checking kcal counts    Nutrition Support (via J-port of GJ): Pivot 1.5 at 80mL/hr x 16 hrs (1280mL/day) via Consilium Softwaretube will provide: 1920 kcals (30 kcal/kg), 120g PRO (1.9 g/kg), 220 g CHO, 945mL free H20, and 9.6+ g fiber daily. Ordered to receive Nutrisource Fiber, 1 pkt twice daily. Each packet of Nutrisource Fiber provides 3 g soluble fiber, 15 kcals, 4 g CHO, and 60 mL H2O.     Diet: Regular diet since 11/26.   Intake: Per RN 11/30, \"little appetite but eating some throughout the day.\" Lack of appetite, early satiety per pt report.    Kcal counts:   11/30      # Meals Ordered from Kitchen: 2 meals. # Meals Recorded: no intake recorded. # Supplements Recorded: no intake recorded.    12/1       Total Kcals: 331       Total Protein: 19g. # Meals Ordered from Kitchen: 1 meal. # Meals Recorded: 1 meal (First - 100% sausage greg, 50% omelet w/ guevara, cheese & onion, 25% coffee) # Supplements Recorded: 0   12/2        # Meals Ordered from Kitchen: 1 meal. # Meals Recorded: No food intake recorded. # Supplements Recorded: No food intake recorded. Per Epic Food Report, Pt consumed 75% at 12PM but there is not enough information to calculate calories/protein.   * No data available " 11/30 and 12/2. On 12/1, not meeting estimated needs of of 1900 - 2400 kcal/day (30 - 37 kcal/kg) and 85 - 130+ g protein/day (1.3 - 2+ g/kg/day).     INTERVENTIONS:  Implementation:  Nutrition Education (pt and significant other): Encouraged small, frequent meals. Encouraged pt to try oral supplements. Goal is to be able to decrease, then discontinue TFs when warranted. Provided handouts: Guide to Your Diet after Transplant and Food Safety booklet.  Medical food supplement therapy: Discussed oral supplement options with pt. Ordered berry Ensure Clear twice daily.   Collaboration with other providers: Discussed TF change with provider.  Enteral Nutrition: Modified TF formula to Vital 1.5 (semi-elemental) via J-port of GJ tube at 80 mL/hr x 16 hrs which provides 1280 mL TF, 1920 kcals (30 kcals/kg), 87 g protein (1.4 g protein/kg), 978 mL H2O, 239 g CHO, and 8+ g fiber daily. Continue Nutrisource Fiber, 2 pkts daily. Each packet of Nutrisource Fiber provides 3 g soluble fiber, 15 kcals, 4 g CHO, and 60 mL H2O.   Ordered kcal counts 12/5-12/7  Ordered a vitamin D lab   Added nutrition discharge instructions.     Follow up/Monitoring:  Will continue to follow pt.    Inessa Patel, MS, RD, LD, [facility]   6C Pgr: [phone]

## 2021-12-03 NOTE — PLAN OF CARE
Neuro: A&Ox4. Neuro checks q 4 hours are intact. Tremors noted in extremities. Mood is withdrawn/sad. Strengths 4/5.   Cardiac: SR. VSS.   Respiratory: Sating greater than 94% on RA. Lung fields clear/diminished.  GI/: Adequate urine output. BM reported earlier today.  Diet/appetite: Tolerating regular diet. Poor appetite. Denies nausea. Cyclic tube feeding started at 4pm.. BG checks every 4 hours.  Activity:  Assist of 1-2, up with therapy today. Encouraging movement in bed.   Pain: At acceptable level on current regimen. Rest used for pain management.  Skin: No new deficits noted. CHG bath completed per protocol. Electrodes changed per protocol.   LDA's: old chest tube sites appear WNL and healing. PEG site reddened, and dressing changed. Bivona CDI. PICC CDI  Bruising noted on all extremities and abdomen.  Plan: Magnesium replaced. Phos replaced. Lasix given per MAR. Continue with POC. Notify [facility] 11 with changes.

## 2021-12-03 NOTE — PROGRESS NOTES
"Transfer  Transferred from: 6D  Via:bed  Reason for transfer:Pt appropriate for 6C- improved patient condition  Family: offered to notify family, pt requesting to wait until morning  Belongings: Pt states all belongings are present. At transfer, pt had phone, , glasses, headphones, personal food, and water bottle  Chart: Sent with pt  Medications: Meds from bin sent with pt  Report called from: Cindy RN      2 RN skin check completed with Lobito ELDRIDGE RN and Victoriano ARCHER RN. On arrival, pt A/O x4, sinus rhythm, no SOB on RA, trach capped, tube feeds running through J site, G site capped, denies pain, antibiotic running through PICC.     Scheduled tylenol given @ 0600. Daily weight to be done with PT.     Plan: Continue to monitor pt    /86 (BP Location: Right arm)   Pulse 93   Temp 98.3  F (36.8  C) (Oral)   Resp 16   Ht 1.626 m (5' 4.02\")   Wt 66.2 kg (145 lb 15.1 oz)   SpO2 94%   BMI 25.04 kg/m      "

## 2021-12-04 ENCOUNTER — APPOINTMENT (OUTPATIENT)
Dept: GENERAL RADIOLOGY | Facility: CLINIC | Age: 56
End: 2021-12-04
Attending: PHYSICIAN ASSISTANT
Payer: COMMERCIAL

## 2021-12-04 ENCOUNTER — APPOINTMENT (OUTPATIENT)
Dept: PHYSICAL THERAPY | Facility: CLINIC | Age: 56
End: 2021-12-04
Attending: STUDENT IN AN ORGANIZED HEALTH CARE EDUCATION/TRAINING PROGRAM
Payer: COMMERCIAL

## 2021-12-04 LAB
ALBUMIN SERPL-MCNC: 2.4 G/DL (ref 3.4–5)
ALP SERPL-CCNC: 113 U/L (ref 40–150)
ALT SERPL W P-5'-P-CCNC: 27 U/L (ref 0–70)
ANION GAP SERPL CALCULATED.3IONS-SCNC: 7 MMOL/L (ref 3–14)
AST SERPL W P-5'-P-CCNC: 23 U/L (ref 0–45)
BACTERIA PLR CULT: NORMAL
BILIRUB SERPL-MCNC: 0.2 MG/DL (ref 0.2–1.3)
BUN SERPL-MCNC: 24 MG/DL (ref 7–30)
CALCIUM SERPL-MCNC: 9.4 MG/DL (ref 8.5–10.1)
CHLORIDE BLD-SCNC: 106 MMOL/L (ref 94–109)
CO2 SERPL-SCNC: 27 MMOL/L (ref 20–32)
CREAT SERPL-MCNC: 0.69 MG/DL (ref 0.66–1.25)
ERYTHROCYTE [DISTWIDTH] IN BLOOD BY AUTOMATED COUNT: 15 % (ref 10–15)
GFR SERPL CREATININE-BSD FRML MDRD: >90 ML/MIN/1.73M2
GLUCOSE BLD-MCNC: 121 MG/DL (ref 70–99)
GLUCOSE BLDC GLUCOMTR-MCNC: 113 MG/DL (ref 70–99)
GLUCOSE BLDC GLUCOMTR-MCNC: 127 MG/DL (ref 70–99)
GLUCOSE BLDC GLUCOMTR-MCNC: 131 MG/DL (ref 70–99)
GLUCOSE BLDC GLUCOMTR-MCNC: 140 MG/DL (ref 70–99)
GLUCOSE BLDC GLUCOMTR-MCNC: 164 MG/DL (ref 70–99)
GLUCOSE BLDC GLUCOMTR-MCNC: 88 MG/DL (ref 70–99)
HCT VFR BLD AUTO: 30.6 % (ref 40–53)
HGB BLD-MCNC: 9.6 G/DL (ref 13.3–17.7)
INR PPP: 2.13 (ref 0.85–1.15)
MAGNESIUM SERPL-MCNC: 1.8 MG/DL (ref 1.6–2.3)
MCH RBC QN AUTO: 30.7 PG (ref 26.5–33)
MCHC RBC AUTO-ENTMCNC: 31.4 G/DL (ref 31.5–36.5)
MCV RBC AUTO: 98 FL (ref 78–100)
PHOSPHATE SERPL-MCNC: 3 MG/DL (ref 2.5–4.5)
PLATELET # BLD AUTO: 118 10E3/UL (ref 150–450)
POTASSIUM BLD-SCNC: 4.4 MMOL/L (ref 3.4–5.3)
PROT SERPL-MCNC: 6.2 G/DL (ref 6.8–8.8)
RBC # BLD AUTO: 3.13 10E6/UL (ref 4.4–5.9)
SODIUM SERPL-SCNC: 140 MMOL/L (ref 133–144)
WBC # BLD AUTO: 11 10E3/UL (ref 4–11)

## 2021-12-04 PROCEDURE — 36592 COLLECT BLOOD FROM PICC: CPT | Performed by: THORACIC SURGERY (CARDIOTHORACIC VASCULAR SURGERY)

## 2021-12-04 PROCEDURE — 82306 VITAMIN D 25 HYDROXY: CPT | Performed by: ANESTHESIOLOGY

## 2021-12-04 PROCEDURE — 250N000013 HC RX MED GY IP 250 OP 250 PS 637

## 2021-12-04 PROCEDURE — 97110 THERAPEUTIC EXERCISES: CPT | Mod: GP

## 2021-12-04 PROCEDURE — 71046 X-RAY EXAM CHEST 2 VIEWS: CPT | Mod: 26 | Performed by: RADIOLOGY

## 2021-12-04 PROCEDURE — 250N000013 HC RX MED GY IP 250 OP 250 PS 637: Performed by: THORACIC SURGERY (CARDIOTHORACIC VASCULAR SURGERY)

## 2021-12-04 PROCEDURE — 250N000011 HC RX IP 250 OP 636: Performed by: STUDENT IN AN ORGANIZED HEALTH CARE EDUCATION/TRAINING PROGRAM

## 2021-12-04 PROCEDURE — 250N000013 HC RX MED GY IP 250 OP 250 PS 637: Performed by: PHYSICIAN ASSISTANT

## 2021-12-04 PROCEDURE — 250N000013 HC RX MED GY IP 250 OP 250 PS 637: Performed by: STUDENT IN AN ORGANIZED HEALTH CARE EDUCATION/TRAINING PROGRAM

## 2021-12-04 PROCEDURE — 97530 THERAPEUTIC ACTIVITIES: CPT | Mod: GP

## 2021-12-04 PROCEDURE — 250N000013 HC RX MED GY IP 250 OP 250 PS 637: Performed by: ANESTHESIOLOGY

## 2021-12-04 PROCEDURE — 94640 AIRWAY INHALATION TREATMENT: CPT

## 2021-12-04 PROCEDURE — 99233 SBSQ HOSP IP/OBS HIGH 50: CPT | Performed by: STUDENT IN AN ORGANIZED HEALTH CARE EDUCATION/TRAINING PROGRAM

## 2021-12-04 PROCEDURE — 85027 COMPLETE CBC AUTOMATED: CPT | Performed by: THORACIC SURGERY (CARDIOTHORACIC VASCULAR SURGERY)

## 2021-12-04 PROCEDURE — 250N000012 HC RX MED GY IP 250 OP 636 PS 637: Performed by: PHYSICIAN ASSISTANT

## 2021-12-04 PROCEDURE — 83735 ASSAY OF MAGNESIUM: CPT | Performed by: THORACIC SURGERY (CARDIOTHORACIC VASCULAR SURGERY)

## 2021-12-04 PROCEDURE — 250N000009 HC RX 250: Performed by: INTERNAL MEDICINE

## 2021-12-04 PROCEDURE — 94640 AIRWAY INHALATION TREATMENT: CPT | Mod: 76

## 2021-12-04 PROCEDURE — 71046 X-RAY EXAM CHEST 2 VIEWS: CPT

## 2021-12-04 PROCEDURE — 94668 MNPJ CHEST WALL SBSQ: CPT

## 2021-12-04 PROCEDURE — 97116 GAIT TRAINING THERAPY: CPT | Mod: GP

## 2021-12-04 PROCEDURE — 250N000013 HC RX MED GY IP 250 OP 250 PS 637: Performed by: NURSE PRACTITIONER

## 2021-12-04 PROCEDURE — 999N000157 HC STATISTIC RCP TIME EA 10 MIN

## 2021-12-04 PROCEDURE — 85610 PROTHROMBIN TIME: CPT | Performed by: THORACIC SURGERY (CARDIOTHORACIC VASCULAR SURGERY)

## 2021-12-04 PROCEDURE — 250N000012 HC RX MED GY IP 250 OP 636 PS 637: Performed by: INTERNAL MEDICINE

## 2021-12-04 PROCEDURE — 80053 COMPREHEN METABOLIC PANEL: CPT | Performed by: THORACIC SURGERY (CARDIOTHORACIC VASCULAR SURGERY)

## 2021-12-04 PROCEDURE — 87102 FUNGUS ISOLATION CULTURE: CPT | Performed by: PHYSICIAN ASSISTANT

## 2021-12-04 PROCEDURE — 99233 SBSQ HOSP IP/OBS HIGH 50: CPT | Mod: 24 | Performed by: INTERNAL MEDICINE

## 2021-12-04 PROCEDURE — 214N000001 HC R&B CCU UMMC

## 2021-12-04 PROCEDURE — 84100 ASSAY OF PHOSPHORUS: CPT | Performed by: STUDENT IN AN ORGANIZED HEALTH CARE EDUCATION/TRAINING PROGRAM

## 2021-12-04 PROCEDURE — 250N000013 HC RX MED GY IP 250 OP 250 PS 637: Performed by: INTERNAL MEDICINE

## 2021-12-04 RX ORDER — MAGNESIUM SULFATE HEPTAHYDRATE 40 MG/ML
2 INJECTION, SOLUTION INTRAVENOUS ONCE
Status: COMPLETED | OUTPATIENT
Start: 2021-12-04 | End: 2021-12-04

## 2021-12-04 RX ORDER — LOPERAMIDE HCL 2 MG
2 CAPSULE ORAL 4 TIMES DAILY PRN
Status: DISCONTINUED | OUTPATIENT
Start: 2021-12-04 | End: 2021-12-13 | Stop reason: HOSPADM

## 2021-12-04 RX ORDER — MAGNESIUM OXIDE 400 MG/1
400 TABLET ORAL 2 TIMES DAILY
Status: DISCONTINUED | OUTPATIENT
Start: 2021-12-04 | End: 2021-12-04

## 2021-12-04 RX ADMIN — MAGNESIUM SULFATE HEPTAHYDRATE 2 G: 40 INJECTION, SOLUTION INTRAVENOUS at 09:48

## 2021-12-04 RX ADMIN — PREDNISONE 10 MG: 5 TABLET ORAL at 20:27

## 2021-12-04 RX ADMIN — NYSTATIN 1000000 UNITS: 500000 SUSPENSION ORAL at 12:48

## 2021-12-04 RX ADMIN — ACETAMINOPHEN 975 MG: 325 TABLET, FILM COATED ORAL at 22:53

## 2021-12-04 RX ADMIN — INSULIN ASPART 1 UNITS: 100 INJECTION, SOLUTION INTRAVENOUS; SUBCUTANEOUS at 17:44

## 2021-12-04 RX ADMIN — CYCLOBENZAPRINE HYDROCHLORIDE 10 MG: 5 TABLET, FILM COATED ORAL at 22:53

## 2021-12-04 RX ADMIN — SODIUM CHLORIDE, PRESERVATIVE FREE 5 ML: 5 INJECTION INTRAVENOUS at 17:44

## 2021-12-04 RX ADMIN — NYSTATIN 1000000 UNITS: 500000 SUSPENSION ORAL at 16:43

## 2021-12-04 RX ADMIN — ACETAMINOPHEN 975 MG: 325 TABLET, FILM COATED ORAL at 14:25

## 2021-12-04 RX ADMIN — Medication 1 PACKET: at 20:27

## 2021-12-04 RX ADMIN — FLUCONAZOLE IN SODIUM CHLORIDE 400 MG: 2 INJECTION, SOLUTION INTRAVENOUS at 09:49

## 2021-12-04 RX ADMIN — PREDNISONE 12.5 MG: 2.5 TABLET ORAL at 09:54

## 2021-12-04 RX ADMIN — ROSUVASTATIN CALCIUM 10 MG: 10 TABLET, FILM COATED ORAL at 09:54

## 2021-12-04 RX ADMIN — MYCOPHENOLATE MOFETIL 1000 MG: 200 POWDER, FOR SUSPENSION ORAL at 09:51

## 2021-12-04 RX ADMIN — NYSTATIN 1000000 UNITS: 500000 SUSPENSION ORAL at 09:52

## 2021-12-04 RX ADMIN — ACETYLCYSTEINE 2 ML: 200 SOLUTION ORAL; RESPIRATORY (INHALATION) at 09:01

## 2021-12-04 RX ADMIN — INSULIN GLARGINE 30 UNITS: 100 INJECTION, SOLUTION SUBCUTANEOUS at 20:47

## 2021-12-04 RX ADMIN — LEVALBUTEROL HYDROCHLORIDE 1.25 MG: 1.25 SOLUTION RESPIRATORY (INHALATION) at 19:46

## 2021-12-04 RX ADMIN — HYDROXYZINE HYDROCHLORIDE 25 MG: 25 TABLET, FILM COATED ORAL at 14:25

## 2021-12-04 RX ADMIN — LOPERAMIDE HYDROCHLORIDE 2 MG: 2 CAPSULE ORAL at 12:48

## 2021-12-04 RX ADMIN — Medication 400 MG: at 20:27

## 2021-12-04 RX ADMIN — AMLODIPINE BESYLATE 5 MG: 2.5 TABLET ORAL at 09:53

## 2021-12-04 RX ADMIN — LEVOTHYROXINE SODIUM 25 MCG: 0.03 TABLET ORAL at 09:54

## 2021-12-04 RX ADMIN — TACROLIMUS 2 MG: 5 CAPSULE ORAL at 17:46

## 2021-12-04 RX ADMIN — Medication 1 TABLET: at 09:53

## 2021-12-04 RX ADMIN — CALCIUM CARBONATE 600 MG (1,500 MG)-VITAMIN D3 400 UNIT TABLET 1 TABLET: at 09:54

## 2021-12-04 RX ADMIN — ACETAMINOPHEN 975 MG: 325 TABLET, FILM COATED ORAL at 06:11

## 2021-12-04 RX ADMIN — TACROLIMUS 2.5 MG: 5 CAPSULE ORAL at 09:51

## 2021-12-04 RX ADMIN — LEVALBUTEROL HYDROCHLORIDE 1.25 MG: 1.25 SOLUTION RESPIRATORY (INHALATION) at 09:01

## 2021-12-04 RX ADMIN — CEFTAZIDIME 2 G: 2 INJECTION, POWDER, FOR SOLUTION INTRAVENOUS at 03:41

## 2021-12-04 RX ADMIN — MYCOPHENOLATE MOFETIL 1000 MG: 200 POWDER, FOR SUSPENSION ORAL at 20:27

## 2021-12-04 RX ADMIN — OXYCODONE HYDROCHLORIDE 10 MG: 5 TABLET ORAL at 11:41

## 2021-12-04 RX ADMIN — INSULIN GLARGINE 30 UNITS: 100 INJECTION, SOLUTION SUBCUTANEOUS at 10:08

## 2021-12-04 RX ADMIN — ACETYLCYSTEINE 2 ML: 200 SOLUTION ORAL; RESPIRATORY (INHALATION) at 19:53

## 2021-12-04 RX ADMIN — CEFTAZIDIME 2 G: 2 INJECTION, POWDER, FOR SOLUTION INTRAVENOUS at 19:59

## 2021-12-04 RX ADMIN — ESCITALOPRAM 5 MG: 5 TABLET, FILM COATED ORAL at 09:53

## 2021-12-04 RX ADMIN — QUETIAPINE FUMARATE 50 MG: 50 TABLET ORAL at 20:27

## 2021-12-04 RX ADMIN — Medication 10 MG: at 20:27

## 2021-12-04 RX ADMIN — NYSTATIN 1000000 UNITS: 500000 SUSPENSION ORAL at 20:27

## 2021-12-04 RX ADMIN — MULTIVIT AND MINERALS-FERROUS GLUCONATE 9 MG IRON/15 ML ORAL LIQUID 15 ML: at 09:50

## 2021-12-04 RX ADMIN — Medication 400 MG: at 12:48

## 2021-12-04 RX ADMIN — Medication 1 PACKET: at 10:23

## 2021-12-04 RX ADMIN — FUROSEMIDE 40 MG: 40 TABLET ORAL at 09:54

## 2021-12-04 RX ADMIN — QUETIAPINE FUMARATE 25 MG: 25 TABLET ORAL at 09:54

## 2021-12-04 RX ADMIN — CALCIUM CARBONATE 600 MG (1,500 MG)-VITAMIN D3 400 UNIT TABLET 1 TABLET: at 17:44

## 2021-12-04 RX ADMIN — Medication 40 MG: at 20:26

## 2021-12-04 RX ADMIN — Medication 40 MG: at 09:50

## 2021-12-04 RX ADMIN — SULFAMETHOXAZOLE AND TRIMETHOPRIM 1 TABLET: 400; 80 TABLET ORAL at 09:53

## 2021-12-04 RX ADMIN — Medication 1.5 MG: at 17:45

## 2021-12-04 RX ADMIN — CEFTAZIDIME 2 G: 2 INJECTION, POWDER, FOR SOLUTION INTRAVENOUS at 12:48

## 2021-12-04 RX ADMIN — LEVOFLOXACIN 750 MG: 5 INJECTION, SOLUTION INTRAVENOUS at 16:43

## 2021-12-04 RX ADMIN — QUETIAPINE FUMARATE 25 MG: 25 TABLET ORAL at 13:34

## 2021-12-04 ASSESSMENT — ACTIVITIES OF DAILY LIVING (ADL)
ADLS_ACUITY_SCORE: 20

## 2021-12-04 NOTE — PLAN OF CARE
D: Admitted 9/5 with respiratory failure. S/p BLST on 10/6. Post-op course with multiple complications.     I: Monitored vitals and assessed pt status.   Changed:  -Pt had one instance of coughing/shortness of breath and requested suctioning via trach - no significant secretions present.   -Still needs sputum specimen collected, unable to expectorate this shift.    Running:  -Triple lumen PICC. Saline locked/TKO + abx.   -Tube feeds cycled 4pm-8AM at 80 mL/hr.  q4h.   PRN:  -Atarax x1, effective.     A: A0x4, anxious. VSS, on room air. Tolerating capped trach - bivona 6. Sinus rhythm/tach. Afebrile. Denies pain. On K, Mg, Phos replacement protocols. BM+.Voiding in adequate amounts.  Blood sugars q4h Up to commode x1 over night. During transfer pt became significantly short of breath, but maintained oxygen saturations >90%. Old chest tube sites on abdomen WNL. PEG tube with G clamped, J with enteral feedings. Up with assist of 1-2 and gait belt/walker. Appeared to sleep intermittently between cares.     I/O this shift:  In: 400   Out: 400 [Urine:400]    Temp:  [97.6  F (36.4  C)-98.9  F (37.2  C)] 98.1  F (36.7  C)  Pulse:  [] 94  Resp:  [15-20] 16  BP: (120-144)/() 133/90  SpO2:  [94 %-97 %] 96 %      P: Continue to monitor Pt status and report changes to treatment team.

## 2021-12-04 NOTE — PROGRESS NOTES
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 Post-op course complicated encephalopathy, and diffuse weakness, acute to subacute by CVA, afib with RVR, BRENNAN, candidemia/candida empyema, and Code Blue due to bradycardia/asystole and hypotension for significant GIB s/p EGD with adherent clot near PEG tube site s/p clips. Transferred from ICU to medicine on 11/23/2021. Weaned off ventilator and chest tubes now removed. Appears ready for discharge to ARU.      ILD and NSIP s/p BSLT on 10/16/2021  Acute hypoxic respiratory failure s/p tracheostomy on 10/29  Bilateral pleural effusions   - Transplant pulmonology following, appreciate recs   - IS: s/p basiliximab on 10/16 and 10/20. Continue tacrolimus 2 mg QAM and 2.5 mg QPM (goal 8-12), MMF 1000 mg BID, and prednisone 12.5 mg QAM and 10 mg QPM  - Ppx: Continue bactrim 400-80 mg daily, nystatin QID x6 months  - Monthly Coccidioides no longer suggested by ID  - DSA every 2 weeks   - Levalbuterol and mucomyst QID and pulm toilet and chest PT QID  - PT/OT/SLP   - TD with cuff down and PMSV during the day, capping trial as long as tolerated  - Restarted oral diuresis at 40 mg lasix daily   - Oxycodone 5-10 mg Q4H   - Discussed trying to use oxy Q4H instead of IV and patient agreeable   - Schedule tylenol     L lung cavitary lesion   suspect aspiration vs pseudomonal vs K pneumonia induced abscess. CT on 10/25 with LLL nodular opacity. Repeat CT chest on 11/22 with new cavitary lesion  in the left lung base, and with multifocal bilateral upper lobe GGO (some of which are new), new 1.8 cm fluid collection with surrounding fat stranding in the left axilla, decreased bilateral loculated pleural effusions. Indeterminate Quant Gold, but negative tuberculin skin tests on mulitple occurences. S/p BAL on 11/22. ID feels less likely fungal. B D glucan positive but has known candidemia. Cocci ag in urine negatie, serum histo negative, CRAG negative  - Transplant ID following   - ID recommends stopping zosyn (11/15-11/23), start Ceftazdime 2 grams Q8H (until 12/21) and levaquin 750 mg daily  (until 12/07)  - Follow up CT chest in 4 weeks 12/21  - Please include in the patient's discharge instructions, follow up with Dr. Abebe on 12/21/2021 @6:30 PM in a virtual visit.   - No indicated labs outside of post-transplant labs.     Klebsiella pneumoniae and Pseudomonas fluorescens/putida HAP  Klebsiella initially noted trach sputum culture 11/10. Bronch culture 11/12 with Klebsiella and Pseudomonas fluorescens/putida (R-meropenem).     - Abx as above.       Disseminated Candida   + candida BCx 10/20 and 10/22.  BDG fungitell positive (399) on 10/20. Sputum Cx + Candida albicans persistently.  TC 10/23 without evidence of endocarditis. Ophthalmology consult 10/24 with benign dilated fundoscopic exam.  Candida empyema also noted 10/25, chest tubes inadvertently removed by CVTS 10/28, left chest tube replaced by IR 11/3 as above with ongoing Candida on cultures. Repeat pleural fungal cultures and fungal/bacterial blood cultures on 11/18 NGTD. Transplant as noted above following   - L chest tube removed 12/1  - Initially on Micafungin (10/22-10/27) transition to fluconazole 400 mg IV daily (start date 10/26 end date 12/21)  - ID recommends evacuate any remaining R and/or left loculated effusions      Hypogammaglobinemia  s/p IVIG with plan to repeat IgG on 12/15     Encephalopathy, resolved   likely multi-factorial.  Multiple brain imaging with stroke as noted below, but negative for PRESS. Ammonia negative. vEEG with severe diffuse encephalopathy. LP as noted below negative for infection. Neurology previously following.   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 MRI brain on 10/23 with multifocal subacute infarct within both cerebral hemispheres and left cerebellum. Presumed embolic with afib hx.   - Anticoagulation as noted below      Afib with hx of RVR - [id number] 4 for HTN, CVA, and DM2. First noted on 10/18, started on amiodarone drip, and converted to NSR. Metoprolol and amidoarone discontinued on 11/20 d/t intermittent bradycardia.   - Anticoagulation with warfarin as noted below  - Continue Rosuvastatin 10 mg daily      R subclavian DVT   dx on 11/4. Resumed on heparin drip on 11/5 and transitioned to warfarin in setting of thrombocytopenia. HIT panel negative on 11/21.   - Continue warfarin per pharmacy protocol (held on 11/26 for thora) will restart.       DM2 with steroid induced hyperglycemia   Hemoglobin A1c 6.6 pre-operatively. Endocrine recently consulted for steroids induced hyperglycemia.   - Continue Lantus 30 mg BID  - Novolog High Sliding scale insulin Q4H  - BG Q4H      Hypomagnesemia   Likely 2/2 suppressed reabsorption from CNI.   - Sliding scale replacement protocol       Diarrhea   C. Diff negative on 11/20. Rectal tube in place on 11/22. Possibly due to tube feeds or magnesium supplementation.   - Monitor I&O      Malnutrition   S/p PEGJ  - MVI, folic acid, B6, B12 supplements   - TF per nutrition      Concern for chipped tooth  Poor dental hygiene  Patient noted he feels like he may have chipped his tooth or has a loose tooth after eating guevara. It is not painful.   - Dental hygiene consult placed  ------ Chronic stable  medical problems ------     RA- Dx 5/2021 with + CCP ab and RF. Previously treated with rituximab. Rheum consulted early in admission. On steroids as noted above.   SALTY - Uses CPAP at bedtime prior to admission. Will need to evaluate for BiPAP after decannulation   HTN- Continue PTA amlodipine 5 mg daily. PRN labetalol and hydralazine for goal SBP <140   Anxiety and depression - Continue PTA lexapro 5 mg daily   Hypothyroidism - TSH 3.17 on 11/19/21. Continue PTA levothyroxine 25 mcg daily      ------ Resolved Hospital problems -------     Recurrent GIB - hgb drop on 10/22 s/p 2 unit pRBC transfusion with EGD on 10/23 with NJ/OG tube trauma with scant oozing. Patient then developed progressive hypotension and ultimately CODE BLUE for GIB in setting of anticoagulation with heparin drip, s/p MTP. EGD on 11/2 with large amount of clotted blood in stomach and area of raised mucosa with small adherent clot near PEG tube site that was clipped, no active bleeding.  Maroon stools 11/3, repeat EGD with extensive old blood in stomach, no active bleeding, small nodular area with prior clips clipped again.  Most recent EGD 11/5 with ulcer noted at PEG tube bumper site, gastritis, and suction marks from G tube. TF noted in G tube drainage bag 11/7, AXR with GJ tube tip projecting over proximal duodenum. GJ tube exchanged 11/9 by GI.  - PO PPI BID      Transaminitis - elevated LFTs post-op, thought to be due to shock liver      BRENNAN - post operative BRENNAN, Cr peaked at 2.05, with renal function back at baseline with Cr at 0.7     Recurring fevers - Fevers post-op, Tmax 101.7 POD #1.  Febrile with worsening leukocytosis again 10/25, generally persisting.  LP (10/29), xanthochromic with pleocytosis thought to be appropriate given RBC and WBCs, no ABX recommended per transplant ID and neurology.  S/p empiric meropenem 10/28-11/2.  Now afebrile, ABX as above.     HSV-  Chronic intermittent active infection pre-transplant with recent HSV  infection: crusted lesions throughout left side of jaw, s/p 10 day treatment course of ACV through 10/9.  HSV PCR blood negative 10/17.  S/p ACV ppx as above (started POD #1 instead of POD #8 given HSV history and location).     Hypernatremia, resolved  Due to inability to have oral intake. Resolved now that he is PO and able to access water.       Diet: Regular Diet Adult  Adult Formula Drip Feeding: Continuous Vital 1.5; Jejunostomy; Goal Rate: 80; mL/hr; From: 4:00 PM; 8:00 AM; Medication - Feeding Tube Flush Frequency: See free water flush order. At least 15-30 mL water before and after medication administration ...  Calorie Counts  Snacks/Supplements Adult: Ensure Clear; Between Meals    DVT Prophylaxis: Heparin SQ  Watson Catheter: Not present  Central Lines: None  Code Status: Full Code      Disposition Plan   Expected discharge: medically ready to discharge to TCU recommended to transitional care unit once antibiotic plan established.     The patient's care was discussed with the Patient and Pulm Consultant.    Gerardo Mahan DO  Hospitalist Service  [facility]  Securely message with the Vocera Web Console (learn more here)  Text page via [employer] Paging/Directory    Please see sign in/sign out for up to date coverage information    Clinically Significant Risk Factors Present on Admission                ______________________________________________________________________    Interval History   Patient seen and examined. No acute events overnight. Slept better overnight but still feels short of breath when laying flat. This is no different than when he was still on IV diuretics. He is still weak and gets winded very easily. Otherwise he has no other issues.    Four point ROS completed and negative unless listed above    Data reviewed today: I reviewed all medications, new labs and imaging results over the last 24 hours. I personally reviewed CXR with improvement in  congestion     Physical Exam   Vital Signs: Temp: 98.3  F (36.8  C) Temp src: Oral BP: 132/84 Pulse: 106   Resp: 18 SpO2: 92 % O2 Device: None (Room air)    Weight: 145 lbs 15.11 oz  General Appearance: NAD  Respiratory: CTA b/l  Cardiovascular: RRR S1/S2, no m,r,g  GI: soft, NT, ND, +BS  Skin: no rashes  Other: No edema     Data   Recent Labs   Lab 12/04/21  1247 12/04/21  0959 12/04/21  0607 12/03/21  0927 12/03/21  0553 12/02/21  0843 12/02/21  0620   WBC  --   --  11.0  --  10.8  --  9.3   HGB  --   --  9.6*  --  8.6*  --  8.7*   MCV  --   --  98  --  97  --  99   PLT  --   --  118*  --  91*  --  90*   INR  --   --  2.13*  --  2.32*  --  2.15*   NA  --   --  140  --  138  --  140   POTASSIUM  --   --  4.4  --  4.4  --  4.1   CHLORIDE  --   --  106  --  105  --  104   CO2  --   --  27  --  28  --  29   BUN  --   --  24  --  37*  --  38*   CR  --   --  0.69  --  0.66  --  0.71   ANIONGAP  --   --  7  --  5  --  7   ERIK  --   --  9.4  --  8.8  --  9.0   * 88 121*   < > 222*   < > 126*   ALBUMIN  --   --  2.4*  --  2.2*  --  2.1*   PROTTOTAL  --   --  6.2*  --  5.6*  --  5.6*   BILITOTAL  --   --  0.2  --  0.1*  --  0.3   ALKPHOS  --   --  113  --  106  --  106   ALT  --   --  27  --  27  --  28   AST  --   --  23  --  21  --  24    < > = values in this interval not displayed.     Recent Results (from the past 24 hour(s))   XR Chest 2 Views    Narrative    EXAM: XR CHEST 2 VW  12/4/2021 9:42 AM     HISTORY:  interval follow up, lung transplant       COMPARISON:  12/13/2021    FINDINGS:   Frontal and lateral chest the chest. Tracheostomy tube projects over  the mid thoracic trachea. Postsurgical changes of bilateral lung  transplantation. Clamshell sternotomy wires are intact. Left PICC tip  in the SVC. The trachea is midline. No appreciable pneumothorax.  Increased right pleural effusion and unchanged loculated left pleural  effusions with persistent bibasilar opacities. The visualized upper  abdomen is  unremarkable. Partially visualized gastrostomy tube.  Chronic right clavicular deformity. No acute osseous abnormality.  Multilevel degenerative changes of the spine.      Impression    IMPRESSION:   1. Stable postoperative changes of bilateral lung transplantation. No  new acute airspace disease.  2. Increased right simple and unchanged loculated left pleural  effusion with associated basilar atelectasis.    I have personally reviewed the examination and initial interpretation  and I agree with the findings.    JODI MENDEZ MD         SYSTEM ID:  [id number]     Medications     dextrose Stopped (10/24/21 1008)     Warfarin Therapy Reminder         acetaminophen  975 mg Oral or Feeding Tube Q8H     acetylcysteine  2 mL Nebulization BID     amLODIPine  5 mg Per G Tube Daily     calcium carbonate 600 mg-vitamin D 400 units  1 tablet Oral or Feeding Tube BID w/meals     cefTAZidime  2 g Intravenous Q8H     cyclobenzaprine  10 mg Oral At Bedtime     escitalopram  5 mg Oral or Feeding Tube Daily     fiber modular (NUTRISOURCE FIBER)  1 packet Per Feeding Tube BID     fluconazole  400 mg Intravenous Q24H     folic acid-vit B6-vit B12  1 tablet Per G Tube Daily     furosemide  40 mg Oral Daily     heparin lock flush  5-20 mL Intracatheter Q24H     insulin aspart  1-12 Units Subcutaneous Q4H     insulin glargine  30 Units Subcutaneous BID     levalbuterol  1.25 mg Nebulization BID     levofloxacin  750 mg Intravenous Q24H     levothyroxine  25 mcg Oral Daily     lidocaine  2 patch Transdermal Q24H     lidocaine   Transdermal Q8H     magnesium oxide  400 mg Oral BID     melatonin  10 mg Oral QPM     menthol   Transdermal Q8H     multivitamins w/minerals  15 mL Per Feeding Tube Daily     mycophenolate  1,000 mg Per J Tube BID     nystatin  1,000,000 Units Swish & Swallow 4x Daily     pantoprazole  40 mg Per Feeding Tube BID     [START ON 12/5/2021] predniSONE  10 mg Oral or Feeding Tube BID     predniSONE  10 mg Per J  Tube QPM     QUEtiapine  25 mg Oral or Feeding Tube BID     QUEtiapine  50 mg Oral At Bedtime     rosuvastatin  10 mg Oral or Feeding Tube Daily     sodium chloride (PF)  10-40 mL Intracatheter Q8H     sodium chloride (PF)  3 mL Intracatheter Q8H     sulfamethoxazole-trimethoprim  1 tablet Oral or Feeding Tube Daily     tacrolimus  2 mg Oral QPM     tacrolimus  2.5 mg Oral QAM     warfarin ANTICOAGULANT  1.5 mg Oral ONCE at 18:00

## 2021-12-04 NOTE — PROGRESS NOTES
Pulmonary Medicine  Cystic Fibrosis - Lung Transplant Team  Progress Note  2021       Patient: Edson Thornton  MRN: [id number]  : 1965 (age 56 year old)  Transplant: 10/16/2021 (Lung), POD#49)  Admission date: 2021    Assessment & Plan:     Edson Thornton is a 56 year old male with a PMH significant for NSIP/ILD, bronchiectasis, moderate PH, RA, SALTY, chronic HSV infection, hypogammaglobulinemia, steroid-induced diabetes, hypothyroidism, PFO, HTN, HLD, duodenal anomaly, anxiety, and depression.  Admitted on 21 from OSH for acute on chronic respiratory failure 2/2 ILD exacerbation, now s/p BSLT on 10/16/21.  Prolonged vent wean s/p trach and PEG/J tube placement with thoracic surgery 10/29.  Post-op course otherwise complicated by encephalopathy and diffuse weakness, acute to subacute CVA, afib with RVR, BRENNAN, GI bleed, Candidemia/Candida empyema, and anxiety.  Code called  for bradycardia/asystole, progressive hypotensive, found to have significant GI bleed, EGD with adherent clot near PEG tube site that was clipped.  Ongoing improvement in weakness and pain management.  Continuous TD since , weaned to RA , and tolerating continuous trach capping since .     Today's recommendations:  - Continuet to cap trach. Will assess for decannulation daily.   - Agree with continuing diuresis.   - Tacrolimus level therapeutic, no dose adjustment, repeat level ordered   - Prednisone taper ordered   - CMV and EBV ordered   - Coccidioides Ag (blood) ordered   - Sputum cultures ordered  - ABX duration per transplant ID (will need repeat chest CT around time of transplant ID follow up )  - Continue fluconazole through  per transplant ID, EKG for QTc monitoring ordered   - IgG ordered 12/15  - DSA ordered   - Monitor ability to increase oral magnesium supplementation pending stools     S/p BSLT for ILD:  Acute hypoxic respiratory failure,  Resolved:   Prolonged vent weaning s/p trach:  Bilateral pleural effusions: Unfortunately had not received vaccination for flu, PNA, or COVID-19 PTA.  Explant pathology with NSIP, no malignancy.  PGD 2-3.  Weaned off paralytic 10/19 (for vent dyssynchrony) and Caroline 10/22.  Initial difficulty weaning sedation given agitation then with neurological findings as below.  Prolonged vent wean s/p trach and PEG/J tube placement with thoracic surgery 10/29.  Code called 11/2 for bradycardia/asystole (required 1 round of CPR, no medications) then progressively hypotensive, GI bleed as below.  Chest CT 11/2 with increased bilateral pleural effusions (moderate left, small right), bibasilar atelectasis with area of hypoenhancing parenchyma in LLL (suspicious for infection), and numerous nondisplaced anterior rib fractures bilaterally.  S/p left chest tube placement in IR 11/3 for pleural effusion/empyema (as below), right deferred given very little effusion on US.  S/p lytics 11/25-11/28 (CXR showing trapped left lung) and right thoracentesis 11/27.  Left chest tube removed 11/30.  Problems with trach cuff leak 11/3 (requiring multiple exchanges), bronch 11/14 with trach in good position with cuff well sealed in trachea and small to moderate secretions mostly in lower lobes.  Chest CT 11/22 with new cavitary lesion in the left lung base with multifocal GGO in bilateral upper lobes, new 1.8 cm fluid collection with surrounding fat stranding in the left axilla, decreased bilateral loculated pleural effusions, and similar small pericardial effusion.  Continuous TD since 11/20, weaned to RA 11/28, and tolerating continuous trach capping since 11/30 (last needed oxygen 12/1).    - Right pleural cultures (11/27) NGTD  - Nebs: levalbuterol and Mucomyst BID  - Pulmonary toilet with chest physiotherapy BID  - Continue capping trach as tolerated, supplemental oxygen as needed to maintain SpO2 >92%  - Will revisit trach decannulation in the  next 1-2 days  - Volume management per primary team, diuresis today  - CXR today with moderate left-sided loculated pleural effusion, stable small right-sided pleural effusion, and bibasilar atelectasis (personally reviewed with Dr. Sifuentes), repeat 12/4 (ordered)  - Defer bronch, revisit PRN  - Pain management per primary team  - May need overnight oximetry study prior to discharge     Immunosuppression: s/p induction therapy with basiliximab 10/16 (and high dose IV steroid) and 10/20  - Tacrolimus 2.5 mg qAM / 2 mg qPM (increased 11/30, suspension, via G tube).  Goal level 8-12.  Repeat level 12/5 (ordered).  - MMF 1000 mg BID (11/2, decreased given GI bleed, AZA to be avoided given TPMT)  - Prednisolone 12.5 mg qAM / 10 mg qPM, next taper due 12/5 (ordered)  Date AM dose (mg) PM dose (mg)   11/21/21 12.5 10   12/5/21 10 10   1/2/22 10 7.5   1/30/22 7.5 7.5   2/27/22 7.5 5   3/27/22 5 5   4/24/22 5 2.5      Prophylaxis:   - Bactrim for PJP ppx (held 11/2-11/5 d/t BRENNAN)  - Nystatin for oral candidiasis ppx, 6 month course  - See below for serologies and viral ppx:    Donor Recipient Intervention   CMV status Negative Negative None, CMV monthly (due 12/14, neg 11/16)   EBV status Positive Positive None, EBV monthly (due 12/14, neg 11/16)   HSV status N/A Positive S/p acyclovir POD #1-30 (recent infection history pre-txp)      ID: Concern for possible Strongyloides exposure pre-transplant s/p ivermectin x1 dose (9/17).  Donor and recipient cultures NGTD.  S/p IV ceftazidime/vancomycin for 48h per protocol and additional empiric ceftazidime 10/19-10/23 given recurrent fevers as below.  Cryptococcal Ag negative 10/29.  Reports some blood mixed in sputum 12/3.   - Monthly blood Coccidioides Ag x12 months post-transplant per ID (urine and serum negative 11/17), due 12/17 (ordered)  - Bacterial and fungal sputum cultures (ordered)     Recurring fevers, Resolved:  Fevers post-op, Tmax 101.7 POD #1.  Febrile with worsening  leukocytosis again 10/25, generally persisting.  LP (10/29), xanthochromic with pleocytosis thought to be appropriate given RBC and WBCs, no ABX recommended per transplant ID and neurology.  S/p empiric meropenem 10/28-11/2.       Klebsiella pneumoniae:  Pseudomonas fluorescens/putida:   LLL cavity: Klebsiella initially noted trach sputum culture 11/10.  Started on ceftriaxone 11/11, transitioned to ertapenem 11/12-11/13, and back to ceftriaxone 11/14.  Bronch culture 11/12 with Klebsiella and Pseudomonas fluorescens/putida (R-meropenem).  Chest CT 11/22 with LLL cavity as above, BAL with Klebsiella pneumoniae.  Histo Ag 11/22, 11/23 and Blast Ag 11/22 negative.  - ABX: ceftazidime (11/23-12/21) and levofloxacin (11/23-12/7) per transplant ID; s/p Zosyn (11/15-11/23)  - Transplant ID follow up scheduled 12/21 with repeat chest CT prior      Disseminated Candida: Noted on blood cultures 10/20 and 10/22.  BDG fungitell positive (399) on 10/20.  Respiratory cultures with persistent Candida albicans.  TC 10/23 without evidence of endocarditis.  Ophthalmology consult 10/24 with benign dilated fundoscopic exam.  Candida empyema also noted 10/25, chest tubes inadvertently removed by CVTS 10/28, left chest tube replaced by IR 11/3 as above with ongoing Candida on cultures (11/3, 11/18).  BDG fungitell positive (>500) and Aspergillus galactomannan negative 11/22.  - Fluconazole (10/26-12/21) per transplant ID, repeat EKG for QTc monitoring 12/7 (ordered); s/p micafungin 10/22-10/27     HSV: Chronic intermittent active infection pre-transplant with recent HSV infection: crusted lesions throughout left side of jaw, s/p 10 day treatment course of ACV through 10/9.  HSV PCR blood negative 10/17.  S/p ACV ppx as above (started POD #1 instead of POD #8 given HSV history and location).     Hypogammaglobulinemia: IgG previously low at 364 (9/7).  Noted at 265 at time of transplant, s/p IVIG 10/21, repeat IgG (11/17) 198, s/p IVIG  (with premedication) 11/18.  - Repeat IgG (12/15, ordered)     Positive cross match:   Positive DSA: Note that he received two doses of rituximab in June, which is likely contributing to cross match result.  DSA had been negative through 11/15 and newly positive 11/29 with DQB5 and mfi 2522.  - DSA monitoring weekly (12/6, ordered)     SALTY: Noted pre-transplant.  Home CPAP 6-12 cm H2O.  - Evaluate need for BiPAP after decannulation.     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  MRI 10/23 with multifocal subacute infarcts within both cerebral hemispheres and left cerebellum.  DDx include surgery v embolic v infectious.  Heparin drip started 10/23 (intermittently held with GI bleed as below).  Repeat stroke code 10/25 d/t marked decrease in responsiveness with sedation wean, pupil inequality, and absent gag reflex.  CTA head without obvious new pathology, MRI brain (with and without contrast) primarily revealing for infarct, low likelihood of PRES.  Ammonia normal.  VEEG per neuro with severe diffuse encephalopathy.  Ongoing improvement since 10/29, repeat head CT 11/2 (following code) without new acute intracranial abnormalities.  - AC management per primary team as below   - PT/OT      Afib with RVR: Noted 10/18, started on amiodarone drip and converted to SR, transitioned to PO 10/21, decreased 10/29.  Metoprolol and amiodarone discontinued 11/19 d/t intermittent bradycardia.  AC per primary team.      Right subclavian DVT: Seen on UE US from 11/4.  Heparin drip resumed 11/5, transitioned to warfarin 11/20 and in the setting of thrombocytopenia.  HIT negative 11/21.      Recurrent GI bleed, Resolved: Hemoglobin dropped to 6.6  "10/22, s/p 2 units pRBC.  OG tube with bloody output, EGD 10/23 noted NJ/OG tube trauma with scant oozing.  Progressive hypotension 11/2, hemoglobin 5.8 with bloody G tube output.  Heparin drip held, transitioned to PPI drip, and MTP activated.  EGD 11/2 with large amount of clotted blood in stomach and area of raised mucosa with small adherent clot near PEG tube site that was clipped, no active bleeding.  Maroon stools 11/3, repeat EGD with extensive old blood in stomach, no active bleeding, small nodular area with prior clips clipped again.  Most recent EGD 11/5 with ulcer noted at PEG tube bumper site, gastritis, and suction marks from G tube. TF noted in G tube drainage bag 11/7, AXR with GJ tube tip projecting over proximal duodenum. GJ tube exchanged 11/9 by GI.      Elevated LFTs, Resolved: Shock liver post-op, now resolved.  Intermittent alk phos elevation, recently normal.     Hypomagnesemia: Suppressed reabsorption 2/2 CNI.  Requiring intermittent IV and PO replacement.  - Mag-Ox 400 mg BID, monitor ability to increase pending stools     We appreciate the excellent care provided by the [place] [id number] team.  Recommendations communicated via in person rounding and this note.  Will continue to follow along closely, please do not hesitate to call with any questions or concerns.    Jody Sifuentes MD [facility]     Subjective & Interval History:     Feels about the same, still feels somewhat dyspneic with exertion.  Denies any sputum production.  Feels talking to psych yesterday was helpful.     Review of Systems:     Please see HPI, otherwise the complete 10 point ROS is negative.     Physical Exam:     Vital signs:  Temp: 98.3  F (36.8  C) Temp src: Oral BP: 132/84 Pulse: 106   Resp: 18 SpO2: 92 % O2 Device: None (Room air) Oxygen Delivery: 2 LPM Height: 162.6 cm (5' 4.02\") Weight:  (pt requesting to be weighed later with PT. )  I/O:   Intake/Output Summary (Last 24 hours) at 12/4/2021 1404  Last data filed at " 12/4/2021 1200  Gross per 24 hour   Intake 2697 ml   Output 3000 ml   Net -303 ml       GENERAL: alert, NAD  HEENT: NCAT, EOMI, no scleral icterus, oral mucosa moist  Lungs: good air flow, no crackles, rhonchi or wheezing  Bivona #6 trach in place, cuff down and capped.    CV: RRR, S1S2, no murmurs noted  Abdomen: normoactive BS, soft, non tender  Neuro: AAO X 3  Psychiatric: flat affect, good eye contact  Skin: no rash, jaundice or lesions on limited exam  Extremities: No clubbing, cyanosis or edema.      Lines, Drains, and Devices:  PICC Triple Lumen 11/04/21 Left Basilic Access. PICC okay to use. (Active)   Site Assessment WDL 12/04/21 0945   External Cath Length (cm) 1 cm 11/30/21 1600   Extremity Circumference (cm) 28 cm 11/28/21 1038   Dressing Intervention Chlorhexidine patch 12/03/21 0000   Dressing Change Due 12/05/21 12/04/21 0945   PICC Comment CDI 12/03/21 0400   Mosley - Status infusing 12/04/21 0945   Mosley - Cap Change Due 12/07/21 12/04/21 0945   Red - Status saline locked 12/04/21 0945   Red - Cap Change Due 12/07/21 12/04/21 0945   White - Status infusing 12/04/21 0945   White - Cap Change Due 12/07/21 12/04/21 0945   Extravasation? No 12/04/21 0945   Line Necessity Yes, meets criteria 12/04/21 0945   Number of days: 30     Data:     LABS    CMP:   Recent Labs   Lab 12/04/21  1247 12/04/21  0959 12/04/21  0607 12/04/21  0338 12/03/21  0927 12/03/21  0553 12/02/21  0843 12/02/21  0620 12/01/21  0902 12/01/21  0725   NA  --   --  140  --   --  138  --  140  --  138   POTASSIUM  --   --  4.4  --   --  4.4  --  4.1  --  3.9   CHLORIDE  --   --  106  --   --  105  --  104  --  102   CO2  --   --  27  --   --  28  --  29  --  29   ANIONGAP  --   --  7  --   --  5  --  7  --  7   * 88 121* 140*   < > 222*   < > 126*   < > 150*   BUN  --   --  24  --   --  37*  --  38*  --  38*   CR  --   --  0.69  --   --  0.66  --  0.71  --  0.74   GFRESTIMATED  --   --  >90  --   --  >90  --  >90  --  >90   ERIK  --    --  9.4  --   --  8.8  --  9.0  --  9.0   MAG  --   --  1.8  --   --  1.3*  --  1.6  --  1.6   PHOS  --   --  3.0  --   --  2.4*  --  3.2  --  3.3   PROTTOTAL  --   --  6.2*  --   --  5.6*  --  5.6*  --  5.5*   ALBUMIN  --   --  2.4*  --   --  2.2*  --  2.1*  --  2.2*   BILITOTAL  --   --  0.2  --   --  0.1*  --  0.3  --  0.2   ALKPHOS  --   --  113  --   --  106  --  106  --  103   AST  --   --  23  --   --  21  --  24  --  26   ALT  --   --  27  --   --  27  --  28  --  29    < > = values in this interval not displayed.     CBC:   Recent Labs   Lab 12/04/21  0607 12/03/21  0553 12/02/21 0620 12/01/21  0725   WBC 11.0 10.8 9.3 10.0   RBC 3.13* 2.82* 2.86* 2.94*   HGB 9.6* 8.6* 8.7* 9.0*   HCT 30.6* 27.4* 28.2* 28.9*   MCV 98 97 99 98   MCH 30.7 30.5 30.4 30.6   MCHC 31.4* 31.4* 30.9* 31.1*   RDW 15.0 15.2* 15.3* 15.4*   * 91* 90* 89*       INR:   Recent Labs   Lab 12/04/21  0607 12/03/21  0553 12/02/21  0620 12/01/21  0725   INR 2.13* 2.32* 2.15* 1.74*       Glucose:   Recent Labs   Lab 12/04/21  1247 12/04/21  0959 12/04/21  0607 12/04/21  0338 12/04/21  0001 12/03/21 2004   * 88 121* 140* 127* 164*       Blood Gas:   Recent Labs   Lab 12/01/21  0725   PHV 7.42   PCO2V 50   PO2V 43   HCO3V 32*   LORI 6.6*   O2PER 2       Culture Data No results for input(s): CULT in the last 168 hours.    Virology Data:   Lab Results   Component Value Date    FLUAH1 Negative 11/22/2021    FLUAH3 Negative 11/22/2021    [id number] Negative 11/22/2021    IFLUB Negative 11/22/2021    RSVA Negative 11/22/2021    RSVB Negative 11/22/2021    PIV1 Negative 11/22/2021    PIV2 Negative 11/22/2021    PIV3 Negative 11/22/2021    HMPV Negative 11/22/2021    HRVS Negative 11/22/2021    ADVBE Negative 11/22/2021    ADVC Negative 11/22/2021    ADVC Negative 11/12/2021    ADVC Negative 10/17/2021       Historical CMV results (last 3 of prior testing):  Lab Results   Component Value Date    CMVQNT Not Detected 11/22/2021    CMVQNT Not  Detected 11/16/2021    CMVQNT Not Detected 11/12/2021     No results found for: CMVLOG    Urine Studies    Recent Labs   Lab Test 11/01/21  1336 10/25/21  1507   URINEPH 5.5 5.5   NITRITE Negative Negative   LEUKEST Negative Negative   WBCU 0 2       Most Recent [place] Pulmonary Function Testing (FVC/FEV1 only)  No results found for: 20002  No results found for: 20003  No results found for: 20015  No results found for: 20016    IMAGING    Recent Results (from the past 48 hour(s))   XR Chest 2 Views    Narrative    Exam: XR CHEST 2 VW, 12/3/2021 11:32 AM    Indication: interval follow up, h/o lung transplant, effusions    Comparison: Chest x-ray 12/1/2021    Findings:   PA and lateral views of the chest. Tracheostomy tube projects over the  midthoracic trachea. Stable left upper extremity PICC. Postsurgical  changes of chest with stable clamshell sternotomy wires and an  surgical clips.    Trachea is midline. Stable cardiomediastinal silhouette. No  appreciable pneumothorax. Stable blunting of bilateral costophrenic  angles. Streaky bibasilar opacities. Loculated left-sided pleural  effusion. No focal airspace opacity. Old right clavicular fracture. No  acute osseous abnormalities.      Impression    Impression:   1. Postsurgical changes of bilateral lung transplant.  2. Moderate left-sided loculated pleural effusion. Stable small  right-sided pleural effusion.  3. Bibasilar atelectasis.    I have personally reviewed the examination and initial interpretation  and I agree with the findings.    DAVIN ANGUIANO MD         SYSTEM ID:  [id number]   XR Chest 2 Views    Narrative    EXAM: XR CHEST 2 VW  12/4/2021 9:42 AM     HISTORY:  interval follow up, lung transplant       COMPARISON:  12/13/2021    FINDINGS:   Frontal and lateral chest the chest. Tracheostomy tube projects over  the mid thoracic trachea. Postsurgical changes of bilateral lung  transplantation. Clamshell sternotomy wires are intact. Left PICC tip  in the  SVC. The trachea is midline. No appreciable pneumothorax.  Increased right pleural effusion and unchanged loculated left pleural  effusions with persistent bibasilar opacities. The visualized upper  abdomen is unremarkable. Partially visualized gastrostomy tube.  Chronic right clavicular deformity. No acute osseous abnormality.  Multilevel degenerative changes of the spine.      Impression    IMPRESSION:   1. Stable postoperative changes of bilateral lung transplantation. No  new acute airspace disease.  2. Increased right simple and unchanged loculated left pleural  effusion with associated basilar atelectasis.    I have personally reviewed the examination and initial interpretation  and I agree with the findings.    JODI MENDEZ MD         SYSTEM ID:  [id number]

## 2021-12-04 NOTE — PLAN OF CARE
Pt is A/O x4. Ax1 with GB/SBA to commode. VSS, **L O2. PRN Oxy given for pain. Lung sounds clear throughout. Trach capped. Cough present with no sputum production. Dressing changed and CDI. Continue encouragement of IS. BS active. Loose stools mixed with urine. Low appetite. Regular diet and TF. G tube currently clamped. TF to run per orders. Free water flushes given. Sternal incision and chest tube insertion site KIM. PRN atarax given for anxiety prior to therapy this afternoon. Mag replaced, and re-check in AM. Triple lumen PICC TKO. BG checks Q4H. No coverage needed. Tele NSR/ST. Patient currently sitting up in chair with call light in reach. Discharge plans pending further progression.

## 2021-12-05 LAB
ALBUMIN SERPL-MCNC: 2.3 G/DL (ref 3.4–5)
ALP SERPL-CCNC: 109 U/L (ref 40–150)
ALT SERPL W P-5'-P-CCNC: 27 U/L (ref 0–70)
ANION GAP SERPL CALCULATED.3IONS-SCNC: 6 MMOL/L (ref 3–14)
AST SERPL W P-5'-P-CCNC: 21 U/L (ref 0–45)
BILIRUB SERPL-MCNC: 0.2 MG/DL (ref 0.2–1.3)
BUN SERPL-MCNC: 23 MG/DL (ref 7–30)
CALCIUM SERPL-MCNC: 9.1 MG/DL (ref 8.5–10.1)
CHLORIDE BLD-SCNC: 105 MMOL/L (ref 94–109)
CO2 SERPL-SCNC: 27 MMOL/L (ref 20–32)
CREAT SERPL-MCNC: 0.66 MG/DL (ref 0.66–1.25)
ERYTHROCYTE [DISTWIDTH] IN BLOOD BY AUTOMATED COUNT: 14.6 % (ref 10–15)
GFR SERPL CREATININE-BSD FRML MDRD: >90 ML/MIN/1.73M2
GLUCOSE BLD-MCNC: 197 MG/DL (ref 70–99)
GLUCOSE BLDC GLUCOMTR-MCNC: 113 MG/DL (ref 70–99)
GLUCOSE BLDC GLUCOMTR-MCNC: 154 MG/DL (ref 70–99)
GLUCOSE BLDC GLUCOMTR-MCNC: 168 MG/DL (ref 70–99)
GLUCOSE BLDC GLUCOMTR-MCNC: 173 MG/DL (ref 70–99)
GLUCOSE BLDC GLUCOMTR-MCNC: 186 MG/DL (ref 70–99)
GLUCOSE BLDC GLUCOMTR-MCNC: 88 MG/DL (ref 70–99)
HCT VFR BLD AUTO: 28.7 % (ref 40–53)
HGB BLD-MCNC: 9 G/DL (ref 13.3–17.7)
INR PPP: 2.35 (ref 0.85–1.15)
MAGNESIUM SERPL-MCNC: 1.7 MG/DL (ref 1.6–2.3)
MCH RBC QN AUTO: 30.7 PG (ref 26.5–33)
MCHC RBC AUTO-ENTMCNC: 31.4 G/DL (ref 31.5–36.5)
MCV RBC AUTO: 98 FL (ref 78–100)
PHOSPHATE SERPL-MCNC: 3.2 MG/DL (ref 2.5–4.5)
PLATELET # BLD AUTO: 115 10E3/UL (ref 150–450)
POTASSIUM BLD-SCNC: 4.5 MMOL/L (ref 3.4–5.3)
PROT SERPL-MCNC: 5.9 G/DL (ref 6.8–8.8)
RBC # BLD AUTO: 2.93 10E6/UL (ref 4.4–5.9)
SODIUM SERPL-SCNC: 138 MMOL/L (ref 133–144)
TACROLIMUS BLD-MCNC: 7.7 UG/L (ref 5–15)
TME LAST DOSE: NORMAL H
TME LAST DOSE: NORMAL H
WBC # BLD AUTO: 9.5 10E3/UL (ref 4–11)

## 2021-12-05 PROCEDURE — 99233 SBSQ HOSP IP/OBS HIGH 50: CPT | Mod: 24 | Performed by: INTERNAL MEDICINE

## 2021-12-05 PROCEDURE — 85610 PROTHROMBIN TIME: CPT | Performed by: STUDENT IN AN ORGANIZED HEALTH CARE EDUCATION/TRAINING PROGRAM

## 2021-12-05 PROCEDURE — 80053 COMPREHEN METABOLIC PANEL: CPT | Performed by: STUDENT IN AN ORGANIZED HEALTH CARE EDUCATION/TRAINING PROGRAM

## 2021-12-05 PROCEDURE — 250N000011 HC RX IP 250 OP 636: Performed by: STUDENT IN AN ORGANIZED HEALTH CARE EDUCATION/TRAINING PROGRAM

## 2021-12-05 PROCEDURE — 250N000012 HC RX MED GY IP 250 OP 636 PS 637: Performed by: STUDENT IN AN ORGANIZED HEALTH CARE EDUCATION/TRAINING PROGRAM

## 2021-12-05 PROCEDURE — 250N000013 HC RX MED GY IP 250 OP 250 PS 637: Performed by: NURSE PRACTITIONER

## 2021-12-05 PROCEDURE — 250N000013 HC RX MED GY IP 250 OP 250 PS 637: Performed by: ANESTHESIOLOGY

## 2021-12-05 PROCEDURE — 250N000012 HC RX MED GY IP 250 OP 636 PS 637: Performed by: PHYSICIAN ASSISTANT

## 2021-12-05 PROCEDURE — 250N000013 HC RX MED GY IP 250 OP 250 PS 637: Performed by: STUDENT IN AN ORGANIZED HEALTH CARE EDUCATION/TRAINING PROGRAM

## 2021-12-05 PROCEDURE — 84100 ASSAY OF PHOSPHORUS: CPT | Performed by: STUDENT IN AN ORGANIZED HEALTH CARE EDUCATION/TRAINING PROGRAM

## 2021-12-05 PROCEDURE — 99233 SBSQ HOSP IP/OBS HIGH 50: CPT | Performed by: STUDENT IN AN ORGANIZED HEALTH CARE EDUCATION/TRAINING PROGRAM

## 2021-12-05 PROCEDURE — 250N000013 HC RX MED GY IP 250 OP 250 PS 637: Performed by: PHYSICIAN ASSISTANT

## 2021-12-05 PROCEDURE — 250N000009 HC RX 250: Performed by: INTERNAL MEDICINE

## 2021-12-05 PROCEDURE — 999N000157 HC STATISTIC RCP TIME EA 10 MIN

## 2021-12-05 PROCEDURE — 250N000013 HC RX MED GY IP 250 OP 250 PS 637

## 2021-12-05 PROCEDURE — 36592 COLLECT BLOOD FROM PICC: CPT | Performed by: STUDENT IN AN ORGANIZED HEALTH CARE EDUCATION/TRAINING PROGRAM

## 2021-12-05 PROCEDURE — 94640 AIRWAY INHALATION TREATMENT: CPT

## 2021-12-05 PROCEDURE — 999N000044 HC STATISTIC CVC DRESSING CHANGE

## 2021-12-05 PROCEDURE — 250N000013 HC RX MED GY IP 250 OP 250 PS 637: Performed by: THORACIC SURGERY (CARDIOTHORACIC VASCULAR SURGERY)

## 2021-12-05 PROCEDURE — 94640 AIRWAY INHALATION TREATMENT: CPT | Mod: 76

## 2021-12-05 PROCEDURE — 94668 MNPJ CHEST WALL SBSQ: CPT

## 2021-12-05 PROCEDURE — 85027 COMPLETE CBC AUTOMATED: CPT | Performed by: STUDENT IN AN ORGANIZED HEALTH CARE EDUCATION/TRAINING PROGRAM

## 2021-12-05 PROCEDURE — 80197 ASSAY OF TACROLIMUS: CPT | Performed by: PHYSICIAN ASSISTANT

## 2021-12-05 PROCEDURE — 94667 MNPJ CHEST WALL 1ST: CPT

## 2021-12-05 PROCEDURE — 83735 ASSAY OF MAGNESIUM: CPT | Performed by: STUDENT IN AN ORGANIZED HEALTH CARE EDUCATION/TRAINING PROGRAM

## 2021-12-05 PROCEDURE — 250N000012 HC RX MED GY IP 250 OP 636 PS 637: Performed by: INTERNAL MEDICINE

## 2021-12-05 PROCEDURE — 214N000001 HC R&B CCU UMMC

## 2021-12-05 PROCEDURE — 87205 SMEAR GRAM STAIN: CPT | Performed by: PHYSICIAN ASSISTANT

## 2021-12-05 RX ORDER — MYCOPHENOLATE MOFETIL 250 MG/1
1000 CAPSULE ORAL
Status: DISCONTINUED | OUTPATIENT
Start: 2021-12-05 | End: 2021-12-08

## 2021-12-05 RX ORDER — ESCITALOPRAM OXALATE 10 MG/1
10 TABLET ORAL DAILY
Status: DISCONTINUED | OUTPATIENT
Start: 2021-12-06 | End: 2021-12-13 | Stop reason: HOSPADM

## 2021-12-05 RX ORDER — MULTIPLE VITAMINS W/ MINERALS TAB 9MG-400MCG
1 TAB ORAL DAILY
Status: DISCONTINUED | OUTPATIENT
Start: 2021-12-06 | End: 2021-12-13 | Stop reason: HOSPADM

## 2021-12-05 RX ORDER — MAGNESIUM OXIDE 400 MG/1
400 TABLET ORAL 2 TIMES DAILY
Status: DISCONTINUED | OUTPATIENT
Start: 2021-12-05 | End: 2021-12-06

## 2021-12-05 RX ORDER — PANTOPRAZOLE SODIUM 40 MG/1
40 TABLET, DELAYED RELEASE ORAL
Status: DISCONTINUED | OUTPATIENT
Start: 2021-12-05 | End: 2021-12-13 | Stop reason: HOSPADM

## 2021-12-05 RX ORDER — TACROLIMUS 1 MG/1
2 CAPSULE ORAL
Status: DISCONTINUED | OUTPATIENT
Start: 2021-12-05 | End: 2021-12-05

## 2021-12-05 RX ORDER — AMLODIPINE BESYLATE 2.5 MG/1
5 TABLET ORAL DAILY
Status: DISCONTINUED | OUTPATIENT
Start: 2021-12-06 | End: 2021-12-13 | Stop reason: HOSPADM

## 2021-12-05 RX ADMIN — Medication 10 MG: at 19:55

## 2021-12-05 RX ADMIN — Medication 40 MG: at 08:37

## 2021-12-05 RX ADMIN — MULTIVIT AND MINERALS-FERROUS GLUCONATE 9 MG IRON/15 ML ORAL LIQUID 15 ML: at 08:37

## 2021-12-05 RX ADMIN — Medication 1 PACKET: at 08:59

## 2021-12-05 RX ADMIN — NYSTATIN 1000000 UNITS: 500000 SUSPENSION ORAL at 19:55

## 2021-12-05 RX ADMIN — INSULIN GLARGINE 30 UNITS: 100 INJECTION, SOLUTION SUBCUTANEOUS at 08:48

## 2021-12-05 RX ADMIN — CEFTAZIDIME 2 G: 2 INJECTION, POWDER, FOR SOLUTION INTRAVENOUS at 20:01

## 2021-12-05 RX ADMIN — CALCIUM CARBONATE 600 MG (1,500 MG)-VITAMIN D3 400 UNIT TABLET 1 TABLET: at 08:45

## 2021-12-05 RX ADMIN — MYCOPHENOLATE MOFETIL 1000 MG: 250 CAPSULE ORAL at 18:26

## 2021-12-05 RX ADMIN — TACROLIMUS 2.5 MG: 1 CAPSULE ORAL at 13:07

## 2021-12-05 RX ADMIN — CALCIUM CARBONATE 600 MG (1,500 MG)-VITAMIN D3 400 UNIT TABLET 1 TABLET: at 18:28

## 2021-12-05 RX ADMIN — NYSTATIN 1000000 UNITS: 500000 SUSPENSION ORAL at 11:12

## 2021-12-05 RX ADMIN — Medication 1 PACKET: at 19:56

## 2021-12-05 RX ADMIN — LEVOFLOXACIN 750 MG: 5 INJECTION, SOLUTION INTRAVENOUS at 16:32

## 2021-12-05 RX ADMIN — Medication 400 MG: at 11:08

## 2021-12-05 RX ADMIN — INSULIN ASPART 2 UNITS: 100 INJECTION, SOLUTION INTRAVENOUS; SUBCUTANEOUS at 06:24

## 2021-12-05 RX ADMIN — QUETIAPINE FUMARATE 25 MG: 25 TABLET ORAL at 08:44

## 2021-12-05 RX ADMIN — ROSUVASTATIN CALCIUM 10 MG: 10 TABLET, FILM COATED ORAL at 08:45

## 2021-12-05 RX ADMIN — TACROLIMUS 2.5 MG: 1 CAPSULE ORAL at 18:28

## 2021-12-05 RX ADMIN — CYCLOBENZAPRINE HYDROCHLORIDE 10 MG: 5 TABLET, FILM COATED ORAL at 21:36

## 2021-12-05 RX ADMIN — PREDNISONE 10 MG: 10 TABLET ORAL at 08:45

## 2021-12-05 RX ADMIN — Medication 400 MG: at 20:04

## 2021-12-05 RX ADMIN — FUROSEMIDE 40 MG: 40 TABLET ORAL at 08:45

## 2021-12-05 RX ADMIN — LEVALBUTEROL HYDROCHLORIDE 1.25 MG: 1.25 SOLUTION RESPIRATORY (INHALATION) at 09:48

## 2021-12-05 RX ADMIN — PANTOPRAZOLE SODIUM 40 MG: 40 TABLET, DELAYED RELEASE ORAL at 16:32

## 2021-12-05 RX ADMIN — MYCOPHENOLATE MOFETIL 1000 MG: 200 POWDER, FOR SUSPENSION ORAL at 08:36

## 2021-12-05 RX ADMIN — ACETYLCYSTEINE 2 ML: 200 SOLUTION ORAL; RESPIRATORY (INHALATION) at 09:48

## 2021-12-05 RX ADMIN — Medication 1.5 MG: at 18:28

## 2021-12-05 RX ADMIN — QUETIAPINE FUMARATE 50 MG: 50 TABLET ORAL at 19:55

## 2021-12-05 RX ADMIN — AMLODIPINE BESYLATE 5 MG: 2.5 TABLET ORAL at 08:45

## 2021-12-05 RX ADMIN — INSULIN GLARGINE 30 UNITS: 100 INJECTION, SOLUTION SUBCUTANEOUS at 21:37

## 2021-12-05 RX ADMIN — ESCITALOPRAM 5 MG: 5 TABLET, FILM COATED ORAL at 08:45

## 2021-12-05 RX ADMIN — INSULIN ASPART 1 UNITS: 100 INJECTION, SOLUTION INTRAVENOUS; SUBCUTANEOUS at 21:37

## 2021-12-05 RX ADMIN — Medication 1 TABLET: at 08:45

## 2021-12-05 RX ADMIN — LOPERAMIDE HYDROCHLORIDE 2 MG: 2 CAPSULE ORAL at 11:19

## 2021-12-05 RX ADMIN — LEVALBUTEROL HYDROCHLORIDE 1.25 MG: 1.25 SOLUTION RESPIRATORY (INHALATION) at 20:23

## 2021-12-05 RX ADMIN — FLUCONAZOLE IN SODIUM CHLORIDE 400 MG: 2 INJECTION, SOLUTION INTRAVENOUS at 09:02

## 2021-12-05 RX ADMIN — CEFTAZIDIME 2 G: 2 INJECTION, POWDER, FOR SOLUTION INTRAVENOUS at 04:03

## 2021-12-05 RX ADMIN — PREDNISONE 10 MG: 10 TABLET ORAL at 19:55

## 2021-12-05 RX ADMIN — QUETIAPINE FUMARATE 25 MG: 25 TABLET ORAL at 14:10

## 2021-12-05 RX ADMIN — SULFAMETHOXAZOLE AND TRIMETHOPRIM 1 TABLET: 400; 80 TABLET ORAL at 08:45

## 2021-12-05 RX ADMIN — CEFTAZIDIME 2 G: 2 INJECTION, POWDER, FOR SOLUTION INTRAVENOUS at 11:08

## 2021-12-05 RX ADMIN — INSULIN ASPART 1 UNITS: 100 INJECTION, SOLUTION INTRAVENOUS; SUBCUTANEOUS at 01:28

## 2021-12-05 RX ADMIN — NYSTATIN 1000000 UNITS: 500000 SUSPENSION ORAL at 16:32

## 2021-12-05 RX ADMIN — LEVOTHYROXINE SODIUM 25 MCG: 0.03 TABLET ORAL at 08:45

## 2021-12-05 RX ADMIN — ACETYLCYSTEINE 2 ML: 200 SOLUTION ORAL; RESPIRATORY (INHALATION) at 20:24

## 2021-12-05 RX ADMIN — NYSTATIN 1000000 UNITS: 500000 SUSPENSION ORAL at 08:51

## 2021-12-05 ASSESSMENT — ACTIVITIES OF DAILY LIVING (ADL)
ADLS_ACUITY_SCORE: 20

## 2021-12-05 ASSESSMENT — MIFFLIN-ST. JEOR: SCORE: 1401.25

## 2021-12-05 NOTE — PLAN OF CARE
Pt is A/O x4. Ax1 with GB and walker. Stand ad pivot to commode and chair. VSS, RA. States mild pain that's generalized, but declines pain medication/interventions when offered. Lung sounds wheezy this AM, and clear post neb treatments. MYERS noted, and SOB reported per pt sometimes at rest and with activity. Trach capped, dressing changed, site CDI. BS active x4. PEG tube clamped. TF to run at [id number]. Dressing changed, site CDI. BG checks Q4H. Tele NSR/ST with exertion. Mag replaced per protocol. Regular diet with no appetite Encourage drinking ensure and high protein jello/magic cup. Calroie counts 12/5 and 12/6. Patient currently sitting up in chair, call light in reach. Discharge plans to ARU pending.

## 2021-12-05 NOTE — PLAN OF CARE
D/His trach is capped and he is on tube feeds. He does not use either LIdo or Icy Hot patches. He continues on ATB. He was up in recliner and moved into bed. He states he does not have much appetite. He drinks occasional supplements. Transferred to bed with assist of 1  D/tube feeding pump problem so feeds off until new pump arrived, so will delay getting a BS and giving insulin or Lantus for an hour after starting tube feeds. The new pump seems to be working well.   A/slow progress  P/team evaluating daily when to pull trach. Monitor for changes

## 2021-12-05 NOTE — PROGRESS NOTES
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 Post-op course complicated encephalopathy, and diffuse weakness, acute to subacute by CVA, afib with RVR, BRENNAN, candidemia/candida empyema, and Code Blue due to bradycardia/asystole and hypotension for significant GIB s/p EGD with adherent clot near PEG tube site s/p clips. Transferred from ICU to medicine on 11/23/2021. Weaned off ventilator and chest tubes now removed. Appears ready for discharge to ARU.      ILD and NSIP s/p BSLT on 10/16/2021  Acute hypoxic respiratory failure s/p tracheostomy on 10/29  Bilateral pleural effusions   - Transplant pulmonology following, appreciate recs   - IS: s/p basiliximab on 10/16 and 10/20. Continue tacrolimus 2 mg QAM and 2.5 mg QPM (goal 8-12), MMF 1000 mg BID, and prednisone 12.5 mg QAM and 10 mg QPM  - Ppx: Continue bactrim 400-80 mg daily, nystatin QID x6 months  - Monthly Coccidioides no longer suggested by ID  - DSA every 2 weeks   - Levalbuterol and mucomyst QID and pulm toilet and chest PT QID  - PT/OT/SLP   - TD with cuff down and PMSV during the day, capping trial as long as tolerated  - Restarted oral diuresis at 40 mg lasix daily   - Oxycodone 5-10 mg Q4H   - Discussed trying to use oxy Q4H instead of IV and patient agreeable   - Schedule tylenol     L lung cavitary lesion   suspect aspiration vs pseudomonal vs K pneumonia induced abscess. CT on 10/25 with LLL nodular opacity. Repeat CT chest on 11/22 with new cavitary lesion  in the left lung base, and with multifocal bilateral upper lobe GGO (some of which are new), new 1.8 cm fluid collection with surrounding fat stranding in the left axilla, decreased bilateral loculated pleural effusions. Indeterminate Quant Gold, but negative tuberculin skin tests on mulitple occurences. S/p BAL on 11/22. ID feels less likely fungal. B D glucan positive but has known candidemia. Cocci ag in urine negatie, serum histo negative, CRAG negative  - Transplant ID following   - ID recommends stopping zosyn (11/15-11/23), start Ceftazdime 2 grams Q8H (until 12/21) and levaquin 750 mg daily  (until 12/07)  - Follow up CT chest in 4 weeks 12/21  - Please include in the patient's discharge instructions, follow up with Dr. Abebe on 12/21/2021 @6:30 PM in a virtual visit.   - No indicated labs outside of post-transplant labs.     Klebsiella pneumoniae and Pseudomonas fluorescens/putida HAP  Klebsiella initially noted trach sputum culture 11/10. Bronch culture 11/12 with Klebsiella and Pseudomonas fluorescens/putida (R-meropenem).     - Abx as above.       Disseminated Candida   + candida BCx 10/20 and 10/22.  BDG fungitell positive (399) on 10/20. Sputum Cx + Candida albicans persistently.  TC 10/23 without evidence of endocarditis. Ophthalmology consult 10/24 with benign dilated fundoscopic exam.  Candida empyema also noted 10/25, chest tubes inadvertently removed by CVTS 10/28, left chest tube replaced by IR 11/3 as above with ongoing Candida on cultures. Repeat pleural fungal cultures and fungal/bacterial blood cultures on 11/18 NGTD. Transplant as noted above following   - L chest tube removed 12/1  - Initially on Micafungin (10/22-10/27) transition to fluconazole 400 mg IV daily (start date 10/26 end date 12/21)  - ID recommends evacuate any remaining R and/or left loculated effusions      Hypogammaglobinemia  s/p IVIG with plan to repeat IgG on 12/15     Encephalopathy, resolved   likely multi-factorial.  Multiple brain imaging with stroke as noted below, but negative for PRESS. Ammonia negative. vEEG with severe diffuse encephalopathy. LP as noted below negative for infection. Neurology previously following.   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 MRI brain on 10/23 with multifocal subacute infarct within both cerebral hemispheres and left cerebellum. Presumed embolic with afib hx.   - Anticoagulation as noted below      Afib with hx of RVR - [id number] 4 for HTN, CVA, and DM2. First noted on 10/18, started on amiodarone drip, and converted to NSR. Metoprolol and amidoarone discontinued on 11/20 d/t intermittent bradycardia.   - Anticoagulation with warfarin as noted below  - Continue Rosuvastatin 10 mg daily      R subclavian DVT   dx on 11/4. Resumed on heparin drip on 11/5 and transitioned to warfarin in setting of thrombocytopenia. HIT panel negative on 11/21.   - Continue warfarin per pharmacy protocol (held on 11/26 for thora) will restart.       DM2 with steroid induced hyperglycemia   Hemoglobin A1c 6.6 pre-operatively. Endocrine recently consulted for steroids induced hyperglycemia.   - Continue Lantus 30 mg BID  - Novolog High Sliding scale insulin Q4H  - BG Q4H      Hypomagnesemia   Likely 2/2 suppressed reabsorption from CNI.   - Sliding scale replacement protocol       Diarrhea   C. Diff negative on 11/20. Rectal tube in place on 11/22. Possibly due to tube feeds or magnesium supplementation.   - Monitor I&O      Malnutrition   S/p PEGJ  - MVI, folic acid, B6, B12 supplements   - TF per nutrition      Anxiety and depression   Psychiatry reconsulted and after discussion suggest increasing lexapro. Will discuss with patient regarding starting ritalin in the AM for motivation.  - Lexapro increased to 10 mg daily could increase  to 20 in a week ~12/12    Concern for chipped tooth  Poor dental hygiene  Patient noted he feels like he may have chipped his tooth or has a loose tooth after eating guevara. It is not painful.   - Dental hygiene consult placed  ------ Chronic stable medical problems ------     RA- Dx 5/2021 with + CCP ab and RF. Previously treated with rituximab. Rheum consulted early in admission. On steroids as noted above.   SALTY - Uses CPAP at bedtime prior to admission. Will need to evaluate for BiPAP after decannulation   HTN- Continue PTA amlodipine 5 mg daily. PRN labetalol and hydralazine for goal SBP <140   Hypothyroidism - TSH 3.17 on 11/19/21. Continue PTA levothyroxine 25 mcg daily      ------ Resolved Hospital problems -------     Recurrent GIB - hgb drop on 10/22 s/p 2 unit pRBC transfusion with EGD on 10/23 with NJ/OG tube trauma with scant oozing. Patient then developed progressive hypotension and ultimately CODE BLUE for GIB in setting of anticoagulation with heparin drip, s/p MTP. EGD on 11/2 with large amount of clotted blood in stomach and area of raised mucosa with small adherent clot near PEG tube site that was clipped, no active bleeding.  Maroon stools 11/3, repeat EGD with extensive old blood in stomach, no active bleeding, small nodular area with prior clips clipped again.  Most recent EGD 11/5 with ulcer noted at PEG tube bumper site, gastritis, and suction marks from G tube. TF noted in G tube drainage bag 11/7, AXR with GJ tube tip projecting over proximal duodenum. GJ tube exchanged 11/9 by GI.  - PO PPI BID      Transaminitis - elevated LFTs post-op, thought to be due to shock liver      BRENNAN - post operative BRENNAN, Cr peaked at 2.05, with renal function back at baseline with Cr at 0.7     Recurring fevers - Fevers post-op, Tmax 101.7 POD #1.  Febrile with worsening leukocytosis again 10/25, generally persisting.  LP (10/29), xanthochromic with pleocytosis thought to be appropriate given RBC and WBCs,  no ABX recommended per transplant ID and neurology.  S/p empiric meropenem 10/28-11/2.  Now afebrile, ABX as above.     HSV-  Chronic intermittent active infection pre-transplant with recent HSV infection: crusted lesions throughout left side of jaw, s/p 10 day treatment course of ACV through 10/9.  HSV PCR blood negative 10/17.  S/p ACV ppx as above (started POD #1 instead of POD #8 given HSV history and location).     Hypernatremia, resolved  Due to inability to have oral intake. Resolved now that he is PO and able to access water.       Diet: Regular Diet Adult  Adult Formula Drip Feeding: Continuous Vital 1.5; Jejunostomy; Goal Rate: 80; mL/hr; From: 4:00 PM; 8:00 AM; Medication - Feeding Tube Flush Frequency: See free water flush order. At least 15-30 mL water before and after medication administration ...  Calorie Counts  Snacks/Supplements Adult: Ensure Clear; Between Meals    DVT Prophylaxis: Heparin SQ  Watson Catheter: Not present  Central Lines: None  Code Status: Full Code      Disposition Plan   Expected discharge: medically ready to discharge to TCU recommended to transitional care unit once antibiotic plan established.     The patient's care was discussed with the Patient and Pulm Consultant.    Gerardo Mahan DO  Hospitalist Service  [facility]  Securely message with the Vocera Web Console (learn more here)  Text page via [facility] Paging/Directory    Please see sign in/sign out for up to date coverage information    Clinically Significant Risk Factors Present on Admission                ______________________________________________________________________    Interval History   Patient seen and examined. No acute events overnight. Feels about the same today. No increase in dyspnea or cough. Continues to have some mild pain that is bothersome.    Four point ROS completed and negative unless listed above    Data reviewed today: I reviewed all medications, new  labs and imaging results over the last 24 hours.    Physical Exam   Vital Signs: Temp: 97.6  F (36.4  C) Temp src: Oral BP: 137/88 Pulse: 107   Resp: 18 SpO2: 94 % O2 Device: None (Room air)    Weight: 145 lbs 8.06 oz  General Appearance: NAD  Respiratory: CTA b/l  Cardiovascular: RRR S1/S2, no m,r,g  GI: soft, NT, ND, +BS  Skin: no rashes  Other: No edema     Data   Recent Labs   Lab 12/05/21  1130 12/05/21  0739 12/05/21  0637 12/04/21  0959 12/04/21  0607 12/03/21  0927 12/03/21  0553   WBC  --   --  9.5  --  11.0  --  10.8   HGB  --   --  9.0*  --  9.6*  --  8.6*   MCV  --   --  98  --  98  --  97   PLT  --   --  115*  --  118*  --  91*   INR  --   --  2.35*  --  2.13*  --  2.32*   NA  --   --  138  --  140  --  138   POTASSIUM  --   --  4.5  --  4.4  --  4.4   CHLORIDE  --   --  105  --  106  --  105   CO2  --   --  27  --  27  --  28   BUN  --   --  23  --  24  --  37*   CR  --   --  0.66  --  0.69  --  0.66   ANIONGAP  --   --  6  --  7  --  5   ERIK  --   --  9.1  --  9.4  --  8.8   GLC 88 154* 197*   < > 121*   < > 222*   ALBUMIN  --   --  2.3*  --  2.4*  --  2.2*   PROTTOTAL  --   --  5.9*  --  6.2*  --  5.6*   BILITOTAL  --   --  0.2  --  0.2  --  0.1*   ALKPHOS  --   --  109  --  113  --  106   ALT  --   --  27  --  27  --  27   AST  --   --  21  --  23  --  21    < > = values in this interval not displayed.     No results found for this or any previous visit (from the past 24 hour(s)).  Medications     dextrose Stopped (10/24/21 1008)     Warfarin Therapy Reminder         acetaminophen  975 mg Oral or Feeding Tube Q8H     acetylcysteine  2 mL Nebulization BID     [START ON 12/6/2021] amLODIPine  5 mg Oral Daily     calcium carbonate 600 mg-vitamin D 400 units  1 tablet Oral or Feeding Tube BID w/meals     cefTAZidime  2 g Intravenous Q8H     cyclobenzaprine  10 mg Oral At Bedtime     [START ON 12/6/2021] escitalopram  10 mg Oral or Feeding Tube Daily     fiber modular (NUTRISOURCE FIBER)  1 packet Per  Feeding Tube BID     fluconazole  400 mg Intravenous Q24H     [START ON 12/6/2021] folic acid-vit B6-vit B12  1 tablet Oral Daily     furosemide  40 mg Oral Daily     heparin lock flush  5-20 mL Intracatheter Q24H     insulin aspart  1-12 Units Subcutaneous Q4H     insulin glargine  30 Units Subcutaneous BID     levalbuterol  1.25 mg Nebulization BID     levofloxacin  750 mg Intravenous Q24H     levothyroxine  25 mcg Oral Daily     lidocaine  2 patch Transdermal Q24H     lidocaine   Transdermal Q8H     magnesium oxide  400 mg Oral BID     magnesium oxide  400 mg Oral BID     melatonin  10 mg Oral QPM     menthol   Transdermal Q8H     [START ON 12/6/2021] multivitamin w/minerals  1 tablet Oral Daily     mycophenolate  1,000 mg Oral BID IS     nystatin  1,000,000 Units Swish & Swallow 4x Daily     pantoprazole  40 mg Oral BID AC     predniSONE  10 mg Oral or Feeding Tube BID     QUEtiapine  25 mg Oral or Feeding Tube BID     QUEtiapine  50 mg Oral At Bedtime     rosuvastatin  10 mg Oral or Feeding Tube Daily     sodium chloride (PF)  10-40 mL Intracatheter Q8H     sodium chloride (PF)  3 mL Intracatheter Q8H     sulfamethoxazole-trimethoprim  1 tablet Oral or Feeding Tube Daily     tacrolimus  2 mg Oral QPM     [START ON 12/6/2021] tacrolimus  2.5 mg Oral QAM     warfarin ANTICOAGULANT  1.5 mg Oral ONCE at 18:00

## 2021-12-05 NOTE — PLAN OF CARE
Temp: 98  F (36.7  C) Temp src: Oral BP: (!) 143/95 Pulse: 102   Resp: 20 SpO2: 92 % O2 Device: None (Room air)       D: Admitted with ILD complications, now s/p BSLT 10/16 c/b CVA, encephalopathy, acute resp failure, afib RVR, BRENNAN, candida empyema, pleural effusion s/p CT, PEA arrest d/t GIB 11/12. Hx NSIP, RA, bronchiectasis, pHTN, SALTY, HTN, HLD, PLD, hypogammaglobinemia, duodenal anomaly, anxiety, depression    I/A: Bret is A&O x4, withdrawn. Tele in place, ST. VSS on RA, very SOB with activity. Trach in place, capped. PICC in place, hep locked. PEG infusing TF at 80ml/hr (cycled from 7534-5907). No PRN pain or anxiety meds needed this shift. Up with assist x1. Slept well overnight    P: Continue to monitor and follow POC. Possible trach decannulation 12/6. Notify Gold 11 with changes

## 2021-12-05 NOTE — PROGRESS NOTES
Pulmonary Medicine  Cystic Fibrosis - Lung Transplant Team  Progress Note  2021       Patient: Edson Thornton  MRN: [id number]  : 1965 (age 56 year old)  Transplant: 10/16/2021 (Lung), POD#50)  Admission date: 2021    Assessment & Plan:     Edson Thornton is a 56 year old male with a PMH significant for NSIP/ILD, bronchiectasis, moderate PH, RA, SALTY, chronic HSV infection, hypogammaglobulinemia, steroid-induced diabetes, hypothyroidism, PFO, HTN, HLD, duodenal anomaly, anxiety, and depression.  Admitted on 21 from OSH for acute on chronic respiratory failure 2/2 ILD exacerbation, now s/p BSLT on 10/16/21.  Prolonged vent wean s/p trach and PEG/J tube placement with thoracic surgery 10/29.  Post-op course otherwise complicated by encephalopathy and diffuse weakness, acute to subacute CVA, afib with RVR, BRENNAN, GI bleed, Candidemia/Candida empyema, and anxiety.  Code called  for bradycardia/asystole, progressive hypotensive, found to have significant GI bleed, EGD with adherent clot near PEG tube site that was clipped.  Ongoing improvement in weakness and pain management.  Continuous TD since , weaned to RA , and tolerating continuous trach capping since .     Today's recommendations:  - Continuet to cap trach. Will assess for decannulation daily.   - Agree with continuing diuresis.   - Tacrolimus level was slightly subtherapeutic at 7.7 this morning. Dose increased to 2.5mg BID.  Next level on  (ordered).   - Prednisone taper today (ordered). Next taper on  (not ordered).  - CMV and EBV ordered   - Coccidioides Ag (blood) ordered   - Sputum cultures () NGTD  - ABX duration per transplant ID (will need repeat chest CT around time of transplant ID follow up )  - Continue fluconazole through  per transplant ID, EKG for QTc monitoring ordered   - IgG ordered 12/15  - DSA ordered   - Monitor ability to increase oral magnesium  supplementation pending stools     S/p BSLT for ILD:  Acute hypoxic respiratory failure, Resolved:   Prolonged vent weaning s/p trach:  Bilateral pleural effusions: Unfortunately had not received vaccination for flu, PNA, or COVID-19 PTA.  Explant pathology with NSIP, no malignancy.  PGD 2-3.  Weaned off paralytic 10/19 (for vent dyssynchrony) and Caroline 10/22.  Initial difficulty weaning sedation given agitation then with neurological findings as below.  Prolonged vent wean s/p trach and PEG/J tube placement with thoracic surgery 10/29.  Code called 11/2 for bradycardia/asystole (required 1 round of CPR, no medications) then progressively hypotensive, GI bleed as below.  Chest CT 11/2 with increased bilateral pleural effusions (moderate left, small right), bibasilar atelectasis with area of hypoenhancing parenchyma in LLL (suspicious for infection), and numerous nondisplaced anterior rib fractures bilaterally.  S/p left chest tube placement in IR 11/3 for pleural effusion/empyema (as below), right deferred given very little effusion on US.  S/p lytics 11/25-11/28 (CXR showing trapped left lung) and right thoracentesis 11/27.  Left chest tube removed 11/30.  Problems with trach cuff leak 11/3 (requiring multiple exchanges), bronch 11/14 with trach in good position with cuff well sealed in trachea and small to moderate secretions mostly in lower lobes.  Chest CT 11/22 with new cavitary lesion in the left lung base with multifocal GGO in bilateral upper lobes, new 1.8 cm fluid collection with surrounding fat stranding in the left axilla, decreased bilateral loculated pleural effusions, and similar small pericardial effusion.  Continuous TD since 11/20, weaned to RA 11/28, and tolerating continuous trach capping since 11/30 (last needed oxygen 12/1).    - Right pleural cultures (11/27) NGTD  - Nebs: levalbuterol and Mucomyst BID  - Pulmonary toilet with chest physiotherapy BID  - Continue capping trach as tolerated,  supplemental oxygen as needed to maintain SpO2 >92%  - Will revisit trach decannulation in the next 1-2 days  - Volume management per primary team, diuresis today  - CXR today with moderate left-sided loculated pleural effusion, stable small right-sided pleural effusion, and bibasilar atelectasis (personally reviewed with Dr. Sifuentes), repeat 12/4 (ordered)  - Defer bronch, revisit PRN  - Pain management per primary team  - May need overnight oximetry study prior to discharge     Immunosuppression: s/p induction therapy with basiliximab 10/16 (and high dose IV steroid) and 10/20  - Tacrolimus 2.5 mg qAM / 2 mg qPM (increased 11/30, suspension, via G tube).  Goal level 8-12.    - MMF 1000 mg BID (11/2, decreased given GI bleed, AZA to be avoided given TPMT)  - Prednisolone 12.5 mg qAM / 10 mg qPM, next taper due 12/5 (ordered)  Date AM dose (mg) PM dose (mg)   11/21/21 12.5 10   12/5/21 10 10   1/2/22 10 7.5   1/30/22 7.5 7.5   2/27/22 7.5 5   3/27/22 5 5   4/24/22 5 2.5      Prophylaxis:   - Bactrim for PJP ppx (held 11/2-11/5 d/t BRENNAN)  - Nystatin for oral candidiasis ppx, 6 month course  - See below for serologies and viral ppx:    Donor Recipient Intervention   CMV status Negative Negative None, CMV monthly (due 12/14, neg 11/16)   EBV status Positive Positive None, EBV monthly (due 12/14, neg 11/16)   HSV status N/A Positive S/p acyclovir POD #1-30 (recent infection history pre-txp)      ID: Concern for possible Strongyloides exposure pre-transplant s/p ivermectin x1 dose (9/17).  Donor and recipient cultures NGTD.  S/p IV ceftazidime/vancomycin for 48h per protocol and additional empiric ceftazidime 10/19-10/23 given recurrent fevers as below.  Cryptococcal Ag negative 10/29.  Reports some blood mixed in sputum 12/3.   - Monthly blood Coccidioides Ag x12 months post-transplant per ID (urine and serum negative 11/17), due 12/17 (ordered)  - Bacterial and fungal sputum cultures (ordered)     Recurring fevers,  Resolved:  Fevers post-op, Tmax 101.7 POD #1.  Febrile with worsening leukocytosis again 10/25, generally persisting.  LP (10/29), xanthochromic with pleocytosis thought to be appropriate given RBC and WBCs, no ABX recommended per transplant ID and neurology.  S/p empiric meropenem 10/28-11/2.       Klebsiella pneumoniae:  Pseudomonas fluorescens/putida:   LLL cavity: Klebsiella initially noted trach sputum culture 11/10.  Started on ceftriaxone 11/11, transitioned to ertapenem 11/12-11/13, and back to ceftriaxone 11/14.  Bronch culture 11/12 with Klebsiella and Pseudomonas fluorescens/putida (R-meropenem).  Chest CT 11/22 with LLL cavity as above, BAL with Klebsiella pneumoniae.  Histo Ag 11/22, 11/23 and Blast Ag 11/22 negative.  - ABX: ceftazidime (11/23-12/21) and levofloxacin (11/23-12/7) per transplant ID; s/p Zosyn (11/15-11/23)  - Transplant ID follow up scheduled 12/21 with repeat chest CT prior      Disseminated Candida: Noted on blood cultures 10/20 and 10/22.  BDG fungitell positive (399) on 10/20.  Respiratory cultures with persistent Candida albicans.  TC 10/23 without evidence of endocarditis.  Ophthalmology consult 10/24 with benign dilated fundoscopic exam.  Candida empyema also noted 10/25, chest tubes inadvertently removed by CVTS 10/28, left chest tube replaced by IR 11/3 as above with ongoing Candida on cultures (11/3, 11/18).  BDG fungitell positive (>500) and Aspergillus galactomannan negative 11/22.  - Fluconazole (10/26-12/21) per transplant ID, repeat EKG for QTc monitoring 12/7 (ordered); s/p micafungin 10/22-10/27     HSV: Chronic intermittent active infection pre-transplant with recent HSV infection: crusted lesions throughout left side of jaw, s/p 10 day treatment course of ACV through 10/9.  HSV PCR blood negative 10/17.  S/p ACV ppx as above (started POD #1 instead of POD #8 given HSV history and location).     Hypogammaglobulinemia: IgG previously low at 364 (9/7).  Noted at 265 at  time of transplant, s/p IVIG 10/21, repeat IgG (11/17) 198, s/p IVIG (with premedication) 11/18.  - Repeat IgG (12/15, ordered)     Positive cross match:   Positive DSA: Note that he received two doses of rituximab in June, which is likely contributing to cross match result.  DSA had been negative through 11/15 and newly positive 11/29 with DQB5 and mfi 2522.  - DSA monitoring weekly (12/6, ordered)     SALTY: Noted pre-transplant.  Home CPAP 6-12 cm H2O.  - Evaluate need for BiPAP after decannulation.     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  MRI 10/23 with multifocal subacute infarcts within both cerebral hemispheres and left cerebellum.  DDx include surgery v embolic v infectious.  Heparin drip started 10/23 (intermittently held with GI bleed as below).  Repeat stroke code 10/25 d/t marked decrease in responsiveness with sedation wean, pupil inequality, and absent gag reflex.  CTA head without obvious new pathology, MRI brain (with and without contrast) primarily revealing for infarct, low likelihood of PRES.  Ammonia normal.  VEEG per neuro with severe diffuse encephalopathy.  Ongoing improvement since 10/29, repeat head CT 11/2 (following code) without new acute intracranial abnormalities.  - AC management per primary team as below   - PT/OT      Afib with RVR: Noted 10/18, started on amiodarone drip and converted to SR, transitioned to PO 10/21, decreased 10/29.  Metoprolol and amiodarone discontinued 11/19 d/t intermittent bradycardia.  AC per primary team.      Right subclavian DVT: Seen on UE US from 11/4.  Heparin drip resumed 11/5, transitioned to warfarin 11/20 and in the setting of thrombocytopenia.  HIT negative  11/21.      Recurrent GI bleed, Resolved: Hemoglobin dropped to 6.6 10/22, s/p 2 units pRBC.  OG tube with bloody output, EGD 10/23 noted NJ/OG tube trauma with scant oozing.  Progressive hypotension 11/2, hemoglobin 5.8 with bloody G tube output.  Heparin drip held, transitioned to PPI drip, and MTP activated.  EGD 11/2 with large amount of clotted blood in stomach and area of raised mucosa with small adherent clot near PEG tube site that was clipped, no active bleeding.  Maroon stools 11/3, repeat EGD with extensive old blood in stomach, no active bleeding, small nodular area with prior clips clipped again.  Most recent EGD 11/5 with ulcer noted at PEG tube bumper site, gastritis, and suction marks from G tube. TF noted in G tube drainage bag 11/7, AXR with GJ tube tip projecting over proximal duodenum. GJ tube exchanged 11/9 by GI.      Elevated LFTs, Resolved: Shock liver post-op, now resolved.  Intermittent alk phos elevation, recently normal.     Hypomagnesemia: Suppressed reabsorption 2/2 CNI.  Requiring intermittent IV and PO replacement.  - Mag-Ox 400 mg BID, monitor ability to increase pending stools     We appreciate the excellent care provided by the [place] [id number] team.  Recommendations communicated via in person rounding and this note.  Will continue to follow along closely, please do not hesitate to call with any questions or concerns.     Jody Sifuentes MD [facility]     Subjective & Interval History:     Breathing feels a little better today.  Coughed up sputum this morning but did not get it in the cup. Reports slightly yellow with tanya old blood. Still feels dyspneic with exertion.  Still does not have much of an appetite.      Review of Systems:     Please see HPI, otherwise the complete 10 point ROS is negative.     Physical Exam:     Vital signs:  Temp: 97.9  F (36.6  C) Temp src: Oral BP: (!) 138/91 Pulse: 102   Resp: 18 SpO2: 94 % O2 Device: None (Room air) Oxygen Delivery: 2 LPM Height: 162.6 cm  "(5' 4.02\") Weight: 66 kg (145 lb 8.1 oz)  I/O:     Intake/Output Summary (Last 24 hours) at 12/5/2021 0930  Last data filed at 12/5/2021 0600  Gross per 24 hour   Intake 2804 ml   Output 2300 ml   Net 504 ml       GENERAL: alert, NAD  HEENT: NCAT, EOMI, no scleral icterus, OMM.   Neck: Bivona #6 TTS in place, cuff down and capped.  Lungs: good air flow, no crackles, rhonchi or wheezing  CV: RRR, S1S2, no murmurs noted  Abdomen: normoactive BS, soft, non tender  Neuro: AAO X 3  Psychiatric: flat affect, good eye contact  Skin: no rash, jaundice or lesions on limited exam  Extremities: No clubbing, cyanosis or edema.      Lines, Drains, and Devices:  PICC Triple Lumen 11/04/21 Left Basilic Access. PICC okay to use. (Active)   Site Assessment WDL 12/05/21 0900   External Cath Length (cm) 1 cm 11/30/21 1600   Extremity Circumference (cm) 28 cm 11/28/21 1038   Dressing Intervention Chlorhexidine patch;Transparent 12/04/21 2000   Dressing Change Due 12/05/21 12/04/21 0945   PICC Comment CDI 12/05/21 0900   Mosley - Status heparin locked 12/05/21 0900   [place] - Cap Change Due 12/07/21 12/04/21 0945   Red - Status infusing 12/05/21 0900   Red - Cap Change Due 12/07/21 12/04/21 0945   White - Status heparin locked 12/05/21 0900   White - Cap Change Due 12/07/21 12/04/21 0945   Extravasation? No 12/04/21 0945   Line Necessity Yes, meets criteria 12/05/21 0900   Number of days: 31     Data:     LABS    CMP:   Recent Labs   Lab 12/05/21  0739 12/05/21  0637 12/05/21  0623 12/05/21  0126 12/04/21  0959 12/04/21  0607 12/03/21  0927 12/03/21  0553 12/02/21  0843 12/02/21  0620   NA  --  138  --   --   --  140  --  138  --  140   POTASSIUM  --  4.5  --   --   --  4.4  --  4.4  --  4.1   CHLORIDE  --  105  --   --   --  106  --  105  --  104   CO2  --  27  --   --   --  27  --  28  --  29   ANIONGAP  --  6  --   --   --  7  --  5  --  7   * 197* 186* 173*   < > 121*   < > 222*   < > 126*   BUN  --  23  --   --   --  24  --  37* "  --  38*   CR  --  0.66  --   --   --  0.69  --  0.66  --  0.71   GFRESTIMATED  --  >90  --   --   --  >90  --  >90  --  >90   ERIK  --  9.1  --   --   --  9.4  --  8.8  --  9.0   MAG  --  1.7  --   --   --  1.8  --  1.3*  --  1.6   PHOS  --  3.2  --   --   --  3.0  --  2.4*  --  3.2   PROTTOTAL  --  5.9*  --   --   --  6.2*  --  5.6*  --  5.6*   ALBUMIN  --  2.3*  --   --   --  2.4*  --  2.2*  --  2.1*   BILITOTAL  --  0.2  --   --   --  0.2  --  0.1*  --  0.3   ALKPHOS  --  109  --   --   --  113  --  106  --  106   AST  --  21  --   --   --  23  --  21  --  24   ALT  --  27  --   --   --  27  --  27  --  28    < > = values in this interval not displayed.     CBC:   Recent Labs   Lab 12/05/21 0637 12/04/21 0607 12/03/21 0553 12/02/21 0620   WBC 9.5 11.0 10.8 9.3   RBC 2.93* 3.13* 2.82* 2.86*   HGB 9.0* 9.6* 8.6* 8.7*   HCT 28.7* 30.6* 27.4* 28.2*   MCV 98 98 97 99   MCH 30.7 30.7 30.5 30.4   MCHC 31.4* 31.4* 31.4* 30.9*   RDW 14.6 15.0 15.2* 15.3*   * 118* 91* 90*       INR:   Recent Labs   Lab 12/05/21 0637 12/04/21 0607 12/03/21 0553 12/02/21  0620   INR 2.35* 2.13* 2.32* 2.15*       Glucose:   Recent Labs   Lab 12/05/21  0739 12/05/21 0637 12/05/21  0623 12/05/21  0126 12/04/21  2045 12/04/21  1656   * 197* 186* 173* 131* 164*       Blood Gas:   Recent Labs   Lab 12/01/21  0725   PHV 7.42   PCO2V 50   PO2V 43   HCO3V 32*   LORI 6.6*   O2PER 2       Culture Data No results for input(s): CULT in the last 168 hours.    Virology Data:   Lab Results   Component Value Date    FLUAH1 Negative 11/22/2021    FLUAH3 Negative 11/22/2021    LT7635 Negative 11/22/2021    IFLUB Negative 11/22/2021    RSVA Negative 11/22/2021    RSVB Negative 11/22/2021    PIV1 Negative 11/22/2021    PIV2 Negative 11/22/2021    PIV3 Negative 11/22/2021    HMPV Negative 11/22/2021    HRVS Negative 11/22/2021    ADVBE Negative 11/22/2021    ADVC Negative 11/22/2021    ADVC Negative 11/12/2021    ADVC Negative 10/17/2021        Historical CMV results (last 3 of prior testing):  Lab Results   Component Value Date    CMVQNT Not Detected 11/22/2021    CMVQNT Not Detected 11/16/2021    CMVQNT Not Detected 11/12/2021     No results found for: CMVLOG    Urine Studies    Recent Labs   Lab Test 11/01/21  1336 10/25/21  1507   URINEPH 5.5 5.5   NITRITE Negative Negative   LEUKEST Negative Negative   WBCU 0 2       Most Recent [facility] Pulmonary Function Testing (FVC/FEV1 only)  No results found for: 20002  No results found for: 20003  No results found for: 20015  No results found for: 20016    IMAGING    Recent Results (from the past 48 hour(s))   XR Chest 2 Views    Narrative    Exam: XR CHEST 2 VW, 12/3/2021 11:32 AM    Indication: interval follow up, h/o lung transplant, effusions    Comparison: Chest x-ray 12/1/2021    Findings:   PA and lateral views of the chest. Tracheostomy tube projects over the  midthoracic trachea. Stable left upper extremity PICC. Postsurgical  changes of chest with stable clamshell sternotomy wires and an  surgical clips.    Trachea is midline. Stable cardiomediastinal silhouette. No  appreciable pneumothorax. Stable blunting of bilateral costophrenic  angles. Streaky bibasilar opacities. Loculated left-sided pleural  effusion. No focal airspace opacity. Old right clavicular fracture. No  acute osseous abnormalities.      Impression    Impression:   1. Postsurgical changes of bilateral lung transplant.  2. Moderate left-sided loculated pleural effusion. Stable small  right-sided pleural effusion.  3. Bibasilar atelectasis.    I have personally reviewed the examination and initial interpretation  and I agree with the findings.    DAVIN ANGUIANO MD         SYSTEM ID:  [id number]   XR Chest 2 Views    Narrative    EXAM: XR CHEST 2 VW  12/4/2021 9:42 AM     HISTORY:  interval follow up, lung transplant       COMPARISON:  12/13/2021    FINDINGS:   Frontal and lateral chest the chest. Tracheostomy tube projects over  the  mid thoracic trachea. Postsurgical changes of bilateral lung  transplantation. Clamshell sternotomy wires are intact. Left PICC tip  in the SVC. The trachea is midline. No appreciable pneumothorax.  Increased right pleural effusion and unchanged loculated left pleural  effusions with persistent bibasilar opacities. The visualized upper  abdomen is unremarkable. Partially visualized gastrostomy tube.  Chronic right clavicular deformity. No acute osseous abnormality.  Multilevel degenerative changes of the spine.      Impression    IMPRESSION:   1. Stable postoperative changes of bilateral lung transplantation. No  new acute airspace disease.  2. Increased right simple and unchanged loculated left pleural  effusion with associated basilar atelectasis.    I have personally reviewed the examination and initial interpretation  and I agree with the findings.    JODI MENDEZ MD         SYSTEM ID:  [id number]

## 2021-12-06 ENCOUNTER — APPOINTMENT (OUTPATIENT)
Dept: OCCUPATIONAL THERAPY | Facility: CLINIC | Age: 56
End: 2021-12-06
Attending: STUDENT IN AN ORGANIZED HEALTH CARE EDUCATION/TRAINING PROGRAM
Payer: COMMERCIAL

## 2021-12-06 ENCOUNTER — APPOINTMENT (OUTPATIENT)
Dept: PHYSICAL THERAPY | Facility: CLINIC | Age: 56
End: 2021-12-06
Attending: STUDENT IN AN ORGANIZED HEALTH CARE EDUCATION/TRAINING PROGRAM
Payer: COMMERCIAL

## 2021-12-06 LAB
ALBUMIN SERPL-MCNC: 2.4 G/DL (ref 3.4–5)
ALP SERPL-CCNC: 114 U/L (ref 40–150)
ALT SERPL W P-5'-P-CCNC: 32 U/L (ref 0–70)
ANION GAP SERPL CALCULATED.3IONS-SCNC: 9 MMOL/L (ref 3–14)
AST SERPL W P-5'-P-CCNC: 26 U/L (ref 0–45)
BACTERIA BRONCH: NORMAL
BILIRUB SERPL-MCNC: 0.2 MG/DL (ref 0.2–1.3)
BUN SERPL-MCNC: 23 MG/DL (ref 7–30)
CALCIUM SERPL-MCNC: 9.1 MG/DL (ref 8.5–10.1)
CHLORIDE BLD-SCNC: 106 MMOL/L (ref 94–109)
CO2 SERPL-SCNC: 26 MMOL/L (ref 20–32)
CREAT SERPL-MCNC: 0.65 MG/DL (ref 0.66–1.25)
ERYTHROCYTE [DISTWIDTH] IN BLOOD BY AUTOMATED COUNT: 14.6 % (ref 10–15)
GFR SERPL CREATININE-BSD FRML MDRD: >90 ML/MIN/1.73M2
GLUCOSE BLD-MCNC: 199 MG/DL (ref 70–99)
GLUCOSE BLDC GLUCOMTR-MCNC: 117 MG/DL (ref 70–99)
GLUCOSE BLDC GLUCOMTR-MCNC: 144 MG/DL (ref 70–99)
GLUCOSE BLDC GLUCOMTR-MCNC: 166 MG/DL (ref 70–99)
GLUCOSE BLDC GLUCOMTR-MCNC: 187 MG/DL (ref 70–99)
GLUCOSE BLDC GLUCOMTR-MCNC: 191 MG/DL (ref 70–99)
GLUCOSE BLDC GLUCOMTR-MCNC: 193 MG/DL (ref 70–99)
HCT VFR BLD AUTO: 30.4 % (ref 40–53)
HGB BLD-MCNC: 9.6 G/DL (ref 13.3–17.7)
INR PPP: 2.35 (ref 0.85–1.15)
MAGNESIUM SERPL-MCNC: 1.4 MG/DL (ref 1.6–2.3)
MCH RBC QN AUTO: 30.1 PG (ref 26.5–33)
MCHC RBC AUTO-ENTMCNC: 31.6 G/DL (ref 31.5–36.5)
MCV RBC AUTO: 95 FL (ref 78–100)
PHOSPHATE SERPL-MCNC: 3.1 MG/DL (ref 2.5–4.5)
PLATELET # BLD AUTO: 113 10E3/UL (ref 150–450)
POTASSIUM BLD-SCNC: 4.1 MMOL/L (ref 3.4–5.3)
PROT SERPL-MCNC: 6.1 G/DL (ref 6.8–8.8)
RBC # BLD AUTO: 3.19 10E6/UL (ref 4.4–5.9)
SODIUM SERPL-SCNC: 141 MMOL/L (ref 133–144)
WBC # BLD AUTO: 8.2 10E3/UL (ref 4–11)

## 2021-12-06 PROCEDURE — 250N000011 HC RX IP 250 OP 636: Performed by: STUDENT IN AN ORGANIZED HEALTH CARE EDUCATION/TRAINING PROGRAM

## 2021-12-06 PROCEDURE — 97535 SELF CARE MNGMENT TRAINING: CPT | Mod: GO

## 2021-12-06 PROCEDURE — 86832 HLA CLASS I HIGH DEFIN QUAL: CPT | Performed by: PHYSICIAN ASSISTANT

## 2021-12-06 PROCEDURE — 97116 GAIT TRAINING THERAPY: CPT | Mod: GP

## 2021-12-06 PROCEDURE — 86833 HLA CLASS II HIGH DEFIN QUAL: CPT | Performed by: PHYSICIAN ASSISTANT

## 2021-12-06 PROCEDURE — 250N000013 HC RX MED GY IP 250 OP 250 PS 637

## 2021-12-06 PROCEDURE — 80053 COMPREHEN METABOLIC PANEL: CPT | Performed by: STUDENT IN AN ORGANIZED HEALTH CARE EDUCATION/TRAINING PROGRAM

## 2021-12-06 PROCEDURE — 999N000157 HC STATISTIC RCP TIME EA 10 MIN

## 2021-12-06 PROCEDURE — 250N000012 HC RX MED GY IP 250 OP 636 PS 637: Performed by: PHYSICIAN ASSISTANT

## 2021-12-06 PROCEDURE — 97110 THERAPEUTIC EXERCISES: CPT | Mod: GP

## 2021-12-06 PROCEDURE — 250N000012 HC RX MED GY IP 250 OP 636 PS 637: Performed by: STUDENT IN AN ORGANIZED HEALTH CARE EDUCATION/TRAINING PROGRAM

## 2021-12-06 PROCEDURE — 250N000013 HC RX MED GY IP 250 OP 250 PS 637: Performed by: ANESTHESIOLOGY

## 2021-12-06 PROCEDURE — 250N000009 HC RX 250: Performed by: INTERNAL MEDICINE

## 2021-12-06 PROCEDURE — 250N000013 HC RX MED GY IP 250 OP 250 PS 637: Performed by: STUDENT IN AN ORGANIZED HEALTH CARE EDUCATION/TRAINING PROGRAM

## 2021-12-06 PROCEDURE — 94668 MNPJ CHEST WALL SBSQ: CPT

## 2021-12-06 PROCEDURE — 36592 COLLECT BLOOD FROM PICC: CPT | Performed by: PHYSICIAN ASSISTANT

## 2021-12-06 PROCEDURE — 83735 ASSAY OF MAGNESIUM: CPT | Performed by: STUDENT IN AN ORGANIZED HEALTH CARE EDUCATION/TRAINING PROGRAM

## 2021-12-06 PROCEDURE — 97530 THERAPEUTIC ACTIVITIES: CPT | Mod: GO

## 2021-12-06 PROCEDURE — 214N000001 HC R&B CCU UMMC

## 2021-12-06 PROCEDURE — 250N000013 HC RX MED GY IP 250 OP 250 PS 637: Performed by: THORACIC SURGERY (CARDIOTHORACIC VASCULAR SURGERY)

## 2021-12-06 PROCEDURE — 250N000013 HC RX MED GY IP 250 OP 250 PS 637: Performed by: PHYSICIAN ASSISTANT

## 2021-12-06 PROCEDURE — 94640 AIRWAY INHALATION TREATMENT: CPT

## 2021-12-06 PROCEDURE — 97530 THERAPEUTIC ACTIVITIES: CPT | Mod: GP

## 2021-12-06 PROCEDURE — 250N000013 HC RX MED GY IP 250 OP 250 PS 637: Performed by: NURSE PRACTITIONER

## 2021-12-06 PROCEDURE — 99233 SBSQ HOSP IP/OBS HIGH 50: CPT | Performed by: STUDENT IN AN ORGANIZED HEALTH CARE EDUCATION/TRAINING PROGRAM

## 2021-12-06 PROCEDURE — 85014 HEMATOCRIT: CPT | Performed by: STUDENT IN AN ORGANIZED HEALTH CARE EDUCATION/TRAINING PROGRAM

## 2021-12-06 PROCEDURE — 250N000012 HC RX MED GY IP 250 OP 636 PS 637: Performed by: INTERNAL MEDICINE

## 2021-12-06 PROCEDURE — 84100 ASSAY OF PHOSPHORUS: CPT | Performed by: STUDENT IN AN ORGANIZED HEALTH CARE EDUCATION/TRAINING PROGRAM

## 2021-12-06 PROCEDURE — 85610 PROTHROMBIN TIME: CPT | Performed by: STUDENT IN AN ORGANIZED HEALTH CARE EDUCATION/TRAINING PROGRAM

## 2021-12-06 PROCEDURE — 99233 SBSQ HOSP IP/OBS HIGH 50: CPT | Performed by: INTERNAL MEDICINE

## 2021-12-06 RX ORDER — MAGNESIUM SULFATE HEPTAHYDRATE 40 MG/ML
4 INJECTION, SOLUTION INTRAVENOUS ONCE
Status: COMPLETED | OUTPATIENT
Start: 2021-12-06 | End: 2021-12-06

## 2021-12-06 RX ADMIN — QUETIAPINE FUMARATE 25 MG: 25 TABLET ORAL at 08:32

## 2021-12-06 RX ADMIN — ACETYLCYSTEINE 2 ML: 200 SOLUTION ORAL; RESPIRATORY (INHALATION) at 08:08

## 2021-12-06 RX ADMIN — ESCITALOPRAM OXALATE 10 MG: 10 TABLET ORAL at 08:32

## 2021-12-06 RX ADMIN — Medication 1.5 MG: at 17:49

## 2021-12-06 RX ADMIN — PREDNISONE 10 MG: 10 TABLET ORAL at 20:14

## 2021-12-06 RX ADMIN — Medication 1 TABLET: at 08:32

## 2021-12-06 RX ADMIN — INSULIN ASPART 2 UNITS: 100 INJECTION, SOLUTION INTRAVENOUS; SUBCUTANEOUS at 16:24

## 2021-12-06 RX ADMIN — INSULIN ASPART 3 UNITS: 100 INJECTION, SOLUTION INTRAVENOUS; SUBCUTANEOUS at 05:26

## 2021-12-06 RX ADMIN — LOPERAMIDE HYDROCHLORIDE 2 MG: 2 CAPSULE ORAL at 16:19

## 2021-12-06 RX ADMIN — QUETIAPINE FUMARATE 50 MG: 50 TABLET ORAL at 20:14

## 2021-12-06 RX ADMIN — INSULIN ASPART 2 UNITS: 100 INJECTION, SOLUTION INTRAVENOUS; SUBCUTANEOUS at 00:44

## 2021-12-06 RX ADMIN — QUETIAPINE FUMARATE 25 MG: 25 TABLET ORAL at 14:48

## 2021-12-06 RX ADMIN — INSULIN ASPART 3 UNITS: 100 INJECTION, SOLUTION INTRAVENOUS; SUBCUTANEOUS at 12:30

## 2021-12-06 RX ADMIN — PANTOPRAZOLE SODIUM 40 MG: 40 TABLET, DELAYED RELEASE ORAL at 08:31

## 2021-12-06 RX ADMIN — CYCLOBENZAPRINE HYDROCHLORIDE 10 MG: 5 TABLET, FILM COATED ORAL at 21:42

## 2021-12-06 RX ADMIN — NYSTATIN 1000000 UNITS: 500000 SUSPENSION ORAL at 16:19

## 2021-12-06 RX ADMIN — CEFTAZIDIME 2 G: 2 INJECTION, POWDER, FOR SOLUTION INTRAVENOUS at 03:58

## 2021-12-06 RX ADMIN — Medication 1 TABLET: at 08:30

## 2021-12-06 RX ADMIN — CALCIUM CARBONATE 600 MG (1,500 MG)-VITAMIN D3 400 UNIT TABLET 1 TABLET: at 17:47

## 2021-12-06 RX ADMIN — CALCIUM CARBONATE 600 MG (1,500 MG)-VITAMIN D3 400 UNIT TABLET 1 TABLET: at 08:31

## 2021-12-06 RX ADMIN — TACROLIMUS 2.5 MG: 1 CAPSULE ORAL at 17:47

## 2021-12-06 RX ADMIN — CEFTAZIDIME 2 G: 2 INJECTION, POWDER, FOR SOLUTION INTRAVENOUS at 12:28

## 2021-12-06 RX ADMIN — TACROLIMUS 2.5 MG: 1 CAPSULE ORAL at 08:30

## 2021-12-06 RX ADMIN — SULFAMETHOXAZOLE AND TRIMETHOPRIM 1 TABLET: 400; 80 TABLET ORAL at 08:32

## 2021-12-06 RX ADMIN — INSULIN GLARGINE 30 UNITS: 100 INJECTION, SOLUTION SUBCUTANEOUS at 08:32

## 2021-12-06 RX ADMIN — AMLODIPINE BESYLATE 5 MG: 2.5 TABLET ORAL at 08:30

## 2021-12-06 RX ADMIN — INSULIN ASPART 1 UNITS: 100 INJECTION, SOLUTION INTRAVENOUS; SUBCUTANEOUS at 20:08

## 2021-12-06 RX ADMIN — FUROSEMIDE 40 MG: 40 TABLET ORAL at 08:31

## 2021-12-06 RX ADMIN — SODIUM CHLORIDE, PRESERVATIVE FREE 5 ML: 5 INJECTION INTRAVENOUS at 05:41

## 2021-12-06 RX ADMIN — Medication 1 PACKET: at 20:06

## 2021-12-06 RX ADMIN — CEFTAZIDIME 2 G: 2 INJECTION, POWDER, FOR SOLUTION INTRAVENOUS at 19:36

## 2021-12-06 RX ADMIN — MAGNESIUM SULFATE IN WATER 4 G: 40 INJECTION, SOLUTION INTRAVENOUS at 08:52

## 2021-12-06 RX ADMIN — QUETIAPINE FUMARATE 25 MG: 25 TABLET ORAL at 14:51

## 2021-12-06 RX ADMIN — Medication 10 MG: at 20:14

## 2021-12-06 RX ADMIN — PREDNISONE 10 MG: 10 TABLET ORAL at 08:32

## 2021-12-06 RX ADMIN — Medication 1 PACKET: at 08:41

## 2021-12-06 RX ADMIN — MYCOPHENOLATE MOFETIL 1000 MG: 250 CAPSULE ORAL at 08:31

## 2021-12-06 RX ADMIN — Medication 400 MG: at 20:14

## 2021-12-06 RX ADMIN — Medication 400 MG: at 12:28

## 2021-12-06 RX ADMIN — ROSUVASTATIN CALCIUM 10 MG: 10 TABLET, FILM COATED ORAL at 08:31

## 2021-12-06 RX ADMIN — NYSTATIN 1000000 UNITS: 500000 SUSPENSION ORAL at 12:27

## 2021-12-06 RX ADMIN — LEVOFLOXACIN 750 MG: 5 INJECTION, SOLUTION INTRAVENOUS at 16:59

## 2021-12-06 RX ADMIN — NYSTATIN 1000000 UNITS: 500000 SUSPENSION ORAL at 20:14

## 2021-12-06 RX ADMIN — PANTOPRAZOLE SODIUM 40 MG: 40 TABLET, DELAYED RELEASE ORAL at 16:19

## 2021-12-06 RX ADMIN — LEVALBUTEROL HYDROCHLORIDE 1.25 MG: 1.25 SOLUTION RESPIRATORY (INHALATION) at 08:08

## 2021-12-06 RX ADMIN — NYSTATIN 1000000 UNITS: 500000 SUSPENSION ORAL at 08:42

## 2021-12-06 RX ADMIN — INSULIN GLARGINE 30 UNITS: 100 INJECTION, SOLUTION SUBCUTANEOUS at 20:06

## 2021-12-06 RX ADMIN — FLUCONAZOLE IN SODIUM CHLORIDE 400 MG: 2 INJECTION, SOLUTION INTRAVENOUS at 09:51

## 2021-12-06 RX ADMIN — LEVOTHYROXINE SODIUM 25 MCG: 0.03 TABLET ORAL at 08:32

## 2021-12-06 RX ADMIN — MYCOPHENOLATE MOFETIL 1000 MG: 250 CAPSULE ORAL at 17:47

## 2021-12-06 ASSESSMENT — ACTIVITIES OF DAILY LIVING (ADL)
ADLS_ACUITY_SCORE: 20
ADLS_ACUITY_SCORE: 18
ADLS_ACUITY_SCORE: 20
ADLS_ACUITY_SCORE: 18
ADLS_ACUITY_SCORE: 20
ADLS_ACUITY_SCORE: 18
ADLS_ACUITY_SCORE: 20
ADLS_ACUITY_SCORE: 20
ADLS_ACUITY_SCORE: 18
ADLS_ACUITY_SCORE: 20
ADLS_ACUITY_SCORE: 18
ADLS_ACUITY_SCORE: 18
ADLS_ACUITY_SCORE: 20
ADLS_ACUITY_SCORE: 18
ADLS_ACUITY_SCORE: 20
ADLS_ACUITY_SCORE: 20
ADLS_ACUITY_SCORE: 18
ADLS_ACUITY_SCORE: 20
ADLS_ACUITY_SCORE: 18
ADLS_ACUITY_SCORE: 18

## 2021-12-06 NOTE — PROGRESS NOTES
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 Post-op course complicated encephalopathy, and diffuse weakness, acute to subacute by CVA, afib with RVR, BRENNAN, candidemia/candida empyema, and Code Blue due to bradycardia/asystole and hypotension for significant GIB s/p EGD with adherent clot near PEG tube site s/p clips. Transferred from ICU to medicine on 11/23/2021. Weaned off ventilator and chest tubes now removed. Appears ready for discharge to ARU.      ILD and NSIP s/p BSLT on 10/16/2021  Acute hypoxic respiratory failure s/p tracheostomy on 10/29  Bilateral pleural effusions   - Transplant pulmonology following, appreciate recs   - IS: s/p basiliximab on 10/16 and 10/20. Continue tacrolimus 2 mg QAM and 2.5 mg QPM (goal 8-12), MMF 1000 mg BID, and prednisone 12.5 mg QAM and 10 mg QPM  - Ppx: Continue bactrim 400-80 mg daily, nystatin QID x6 months  - Monthly Coccidioides no longer suggested by ID  - DSA every 2 weeks   - Levalbuterol and mucomyst QID and pulm toilet and chest PT QID  - PT/OT/SLP   - TD with cuff down and PMSV during the day, capping trial as long as tolerated  - Restarted oral diuresis at 40 mg lasix daily   - Oxycodone 5-10 mg Q4H   - Discussed trying to use oxy Q4H instead of IV and patient agreeable   - Schedule tylenol     L lung cavitary lesion   suspect aspiration vs pseudomonal vs K pneumonia induced abscess. CT on 10/25 with LLL nodular opacity. Repeat CT chest on 11/22 with new cavitary lesion  in the left lung base, and with multifocal bilateral upper lobe GGO (some of which are new), new 1.8 cm fluid collection with surrounding fat stranding in the left axilla, decreased bilateral loculated pleural effusions. Indeterminate Quant Gold, but negative tuberculin skin tests on mulitple occurences. S/p BAL on 11/22. ID feels less likely fungal. B D glucan positive but has known candidemia. Cocci ag in urine negatie, serum histo negative, CRAG negative  - Transplant ID following   - ID recommends stopping zosyn (11/15-11/23), start Ceftazdime 2 grams Q8H (until 12/21) and levaquin 750 mg daily  (until 12/07)  - Follow up CT chest in 4 weeks 12/21  - Please include in the patient's discharge instructions, follow up with Dr. Abebe on 12/21/2021 @6:30 PM in a virtual visit.   - No indicated labs outside of post-transplant labs.     Klebsiella pneumoniae and Pseudomonas fluorescens/putida HAP  Klebsiella initially noted trach sputum culture 11/10. Bronch culture 11/12 with Klebsiella and Pseudomonas fluorescens/putida (R-meropenem).     - Abx as above.       Disseminated Candida   + candida BCx 10/20 and 10/22.  BDG fungitell positive (399) on 10/20. Sputum Cx + Candida albicans persistently.  TC 10/23 without evidence of endocarditis. Ophthalmology consult 10/24 with benign dilated fundoscopic exam.  Candida empyema also noted 10/25, chest tubes inadvertently removed by CVTS 10/28, left chest tube replaced by IR 11/3 as above with ongoing Candida on cultures. Repeat pleural fungal cultures and fungal/bacterial blood cultures on 11/18 NGTD. Transplant as noted above following   - L chest tube removed 12/1  - Initially on Micafungin (10/22-10/27) transition to fluconazole 400 mg IV daily (start date 10/26 end date 12/21)  - ID recommends evacuate any remaining R and/or left loculated effusions      Hypogammaglobinemia  s/p IVIG with plan to repeat IgG on 12/15     Encephalopathy, resolved   likely multi-factorial.  Multiple brain imaging with stroke as noted below, but negative for PRESS. Ammonia negative. vEEG with severe diffuse encephalopathy. LP as noted below negative for infection. Neurology previously following.   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 MRI brain on 10/23 with multifocal subacute infarct within both cerebral hemispheres and left cerebellum. Presumed embolic with afib hx.   - Anticoagulation as noted below      Afib with hx of RVR - [id number] 4 for HTN, CVA, and DM2. First noted on 10/18, started on amiodarone drip, and converted to NSR. Metoprolol and amidoarone discontinued on 11/20 d/t intermittent bradycardia.   - Anticoagulation with warfarin as noted below  - Continue Rosuvastatin 10 mg daily      R subclavian DVT   dx on 11/4. Resumed on heparin drip on 11/5 and transitioned to warfarin in setting of thrombocytopenia. HIT panel negative on 11/21.   - Continue warfarin per pharmacy protocol     DM2 with steroid induced hyperglycemia   Hemoglobin A1c 6.6 pre-operatively. Endocrine recently consulted for steroids induced hyperglycemia.   - Continue Lantus 30 mg BID  - Novolog High Sliding scale insulin Q4H  - BG Q4H      Hypomagnesemia   Likely 2/2 suppressed reabsorption from CNI.   - Sliding scale replacement protocol       Diarrhea   C. Diff negative on 11/20. Rectal tube in place on 11/22. Possibly due to tube feeds or magnesium supplementation.   - Monitor I&O      Malnutrition   S/p PEGJ  - MVI, folic acid, B6, B12 supplements   - TF per nutrition      Anxiety and depression   Psychiatry reconsulted and after discussion suggest increasing lexapro. Will discuss with patient regarding starting ritalin in the AM for motivation.  - Lexapro increased to 10 mg daily could increase to 20 in a week ~12/12    Concern for  chipped tooth  Poor dental hygiene  Patient noted he feels like he may have chipped his tooth or has a loose tooth after eating guevara. It is not painful.   - Dental hygiene consult placed and chipped tooth stable rec oral hygiene cares     ------ Chronic stable medical problems ------     RA- Dx 5/2021 with + CCP ab and RF. Previously treated with rituximab. Rheum consulted early in admission. On steroids as noted above.   SALTY - Uses CPAP at bedtime prior to admission. Will need to evaluate for BiPAP after decannulation   HTN- Continue PTA amlodipine 5 mg daily. PRN labetalol and hydralazine for goal SBP <140   Hypothyroidism - TSH 3.17 on 11/19/21. Continue PTA levothyroxine 25 mcg daily      ------ Resolved Hospital problems -------     Recurrent GIB - hgb drop on 10/22 s/p 2 unit pRBC transfusion with EGD on 10/23 with NJ/OG tube trauma with scant oozing. Patient then developed progressive hypotension and ultimately CODE BLUE for GIB in setting of anticoagulation with heparin drip, s/p MTP. EGD on 11/2 with large amount of clotted blood in stomach and area of raised mucosa with small adherent clot near PEG tube site that was clipped, no active bleeding.  Maroon stools 11/3, repeat EGD with extensive old blood in stomach, no active bleeding, small nodular area with prior clips clipped again.  Most recent EGD 11/5 with ulcer noted at PEG tube bumper site, gastritis, and suction marks from G tube. TF noted in G tube drainage bag 11/7, AXR with GJ tube tip projecting over proximal duodenum. GJ tube exchanged 11/9 by GI.  - PO PPI BID      Transaminitis - elevated LFTs post-op, thought to be due to shock liver      BRENNAN - post operative BRENNAN, Cr peaked at 2.05, with renal function back at baseline with Cr at 0.7     Recurring fevers - Fevers post-op, Tmax 101.7 POD #1.  Febrile with worsening leukocytosis again 10/25, generally persisting.  LP (10/29), xanthochromic with pleocytosis thought to be appropriate given RBC  and WBCs, no ABX recommended per transplant ID and neurology.  S/p empiric meropenem 10/28-11/2.  Now afebrile, ABX as above.     HSV-  Chronic intermittent active infection pre-transplant with recent HSV infection: crusted lesions throughout left side of jaw, s/p 10 day treatment course of ACV through 10/9.  HSV PCR blood negative 10/17.  S/p ACV ppx as above (started POD #1 instead of POD #8 given HSV history and location).     Hypernatremia, resolved  Due to inability to have oral intake. Resolved now that he is PO and able to access water.       Diet: Regular Diet Adult  Adult Formula Drip Feeding: Continuous Vital 1.5; Jejunostomy; Goal Rate: 80; mL/hr; From: 4:00 PM; 8:00 AM; Medication - Feeding Tube Flush Frequency: See free water flush order. At least 15-30 mL water before and after medication administration ...  Calorie Counts  Snacks/Supplements Adult: Ensure Clear; Between Meals    DVT Prophylaxis: Heparin SQ  Watson Catheter: Not present  Central Lines: None  Code Status: Full Code      Disposition Plan   Expected discharge: medically ready to discharge to TCU recommended to transitional care unit once antibiotic plan established.     The patient's care was discussed with the Patient and Pulm Consultant.    Gerardo Mahan DO  Hospitalist Service  [facility]  Securely message with the Vocera Web Console (learn more here)  Text page via [facility] Paging/Directory    Please see sign in/sign out for up to date coverage information    Clinically Significant Risk Factors Present on Admission                ______________________________________________________________________    Interval History   Patient seen and examined. No acute events overnight. Doing well today appears to be breathing less labored. Able to lay a little flatter. Does feel like respirations are about the same though. No new cough. Pain about the same as prior. Slept ok last night.    Four point ROS  completed and negative unless listed above    Data reviewed today: I reviewed all medications, new labs and imaging results over the last 24 hours.    Physical Exam   Vital Signs: Temp: 98.4  F (36.9  C) Temp src: Axillary BP: 121/82 Pulse: 107   Resp: 18 SpO2: 94 % O2 Device: None (Room air)    Weight: 145 lbs 8.06 oz  General Appearance: NAD  Respiratory: CTA b/l  Cardiovascular: RRR S1/S2, no m,r,g  GI: soft, NT, ND, +BS  Skin: no rashes  Other: No edema     Data   Recent Labs   Lab 12/06/21  1230 12/06/21  0839 12/06/21  0554 12/05/21  0739 12/05/21  0637 12/04/21  0959 12/04/21  0607   WBC  --   --  8.2  --  9.5  --  11.0   HGB  --   --  9.6*  --  9.0*  --  9.6*   MCV  --   --  95  --  98  --  98   PLT  --   --  113*  --  115*  --  118*   INR  --   --  2.35*  --  2.35*  --  2.13*   NA  --   --  141  --  138  --  140   POTASSIUM  --   --  4.1  --  4.5  --  4.4   CHLORIDE  --   --  106  --  105  --  106   CO2  --   --  26  --  27  --  27   BUN  --   --  23  --  23  --  24   CR  --   --  0.65*  --  0.66  --  0.69   ANIONGAP  --   --  9  --  6  --  7   ERIK  --   --  9.1  --  9.1  --  9.4   * 117* 199*   < > 197*   < > 121*   ALBUMIN  --   --  2.4*  --  2.3*  --  2.4*   PROTTOTAL  --   --  6.1*  --  5.9*  --  6.2*   BILITOTAL  --   --  0.2  --  0.2  --  0.2   ALKPHOS  --   --  114  --  109  --  113   ALT  --   --  32  --  27  --  27   AST  --   --  26  --  21  --  23    < > = values in this interval not displayed.     No results found for this or any previous visit (from the past 24 hour(s)).  Medications     dextrose Stopped (10/24/21 1008)     Warfarin Therapy Reminder         acetaminophen  975 mg Oral or Feeding Tube Q8H     acetylcysteine  2 mL Nebulization BID     amLODIPine  5 mg Oral Daily     calcium carbonate 600 mg-vitamin D 400 units  1 tablet Oral or Feeding Tube BID w/meals     cefTAZidime  2 g Intravenous Q8H     cyclobenzaprine  10 mg Oral At Bedtime     escitalopram  10 mg Oral or Feeding  Tube Daily     fiber modular (NUTRISOURCE FIBER)  1 packet Per Feeding Tube BID     fluconazole  400 mg Intravenous Q24H     folic acid-vit B6-vit B12  1 tablet Oral Daily     furosemide  40 mg Oral Daily     heparin lock flush  5-20 mL Intracatheter Q24H     insulin aspart  1-12 Units Subcutaneous Q4H     insulin glargine  30 Units Subcutaneous BID     levalbuterol  1.25 mg Nebulization BID     levofloxacin  750 mg Intravenous Q24H     levothyroxine  25 mcg Oral Daily     lidocaine  2 patch Transdermal Q24H     lidocaine   Transdermal Q8H     magnesium oxide  400 mg Oral BID     melatonin  10 mg Oral QPM     menthol   Transdermal Q8H     multivitamin w/minerals  1 tablet Oral Daily     mycophenolate  1,000 mg Oral BID IS     nystatin  1,000,000 Units Swish & Swallow 4x Daily     pantoprazole  40 mg Oral BID AC     predniSONE  10 mg Oral or Feeding Tube BID     QUEtiapine  25 mg Oral or Feeding Tube BID     QUEtiapine  50 mg Oral At Bedtime     rosuvastatin  10 mg Oral or Feeding Tube Daily     sodium chloride (PF)  10-40 mL Intracatheter Q8H     sodium chloride (PF)  3 mL Intracatheter Q8H     sulfamethoxazole-trimethoprim  1 tablet Oral or Feeding Tube Daily     tacrolimus  2.5 mg Oral QAM     tacrolimus  2.5 mg Oral QPM     warfarin ANTICOAGULANT  1.5 mg Oral ONCE at 18:00

## 2021-12-06 NOTE — CONSULTS
"Dental Hygiene Consult Service        Edson Thornton MRN# [id number]   YOB: 1965 Age: 56 year old     Date of Admission: 9/5/2021  PCP is Lia Spicer  Date of Service: 12/6/2021           Reason for Consult:   Referring MD & Reason for Visit: I was asked by Keren Hood MD, to see Edson Thornton for assess chipped tooth, no pain.          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 Post-op course complicated encephalopathy, and diffuse weakness, acute to subacute by CVA, afib with RVR, BRENNAN, candidemia/candida empyema, and Code Blue due to bradycardia/asystole and hypotension for significant GIB s/p EGD with adherent clot near PEG tube site s/p clips. Transferred from ICU to medicine on 11/23/2021. Weaned off ventilator and chest tubes now removed. Appears ready for discharge to ARU. .  Dental and oropharyngeal history is pertinent for hx of pneumonia, hypoxic respiratory failure, hx of clenching/grinding, tooth EXTs.  The patient reports chipped tooth, no pain.     The patient has a risk for aspiration.     The patient  does not have a history of dentures or dental appliances.    Current oral products and cares performed by the patient and/or nursing include: patient states \"it's hit or miss\", and sometimes will have oral care.               Past Medical History:     Past Medical History:   Diagnosis Date     BRENNAN (acute kidney injury) (H) 10/17/2021     Anxiety      Depression      HLD (hyperlipidemia)      HTN (hypertension)      Hypothyroidism      ILD (interstitial lung disease) (H)      SALTY on CPAP      Oxygen " dependent     BL 4L since ~6/2021     Rheumatoid arthritis (H)     signs ~5/2020, dx 5/2021     S/P lung transplant (H) 10/16/2021     Shock liver 10/17/2021     Steroid-induced hyperglycemia      Traction bronchiectasis (H)                Social History:     Social History     Tobacco Use     Smoking status: Never Smoker     Smokeless tobacco: Never Used   Substance Use Topics     Alcohol use: Never     Drug use: Never              Medications:   No current facility-administered medications on file prior to encounter.  acetaminophen (TYLENOL) 325 MG tablet, Take 325 mg by mouth every 6 hours as needed for mild pain  amLODIPine (NORVASC) 5 MG tablet, Take 5 mg by mouth daily  escitalopram (LEXAPRO) 5 MG tablet, Take 5 mg by mouth daily  fluticasone-vilanterol (BREO ELLIPTA) 200-25 MCG/INH inhaler, Inhale 1 puff into the lungs daily  insulin aspart (NOVOLOG FLEXPEN) 100 UNIT/ML pen, Inject 3-11 Units Subcutaneous With meals and at bedtime.  levothyroxine (SYNTHROID/LEVOTHROID) 25 MCG tablet, Take 25 mcg by mouth daily  omeprazole (PRILOSEC) 40 MG DR capsule, Take 40 mg by mouth 2 times daily (before meals)  potassium chloride ER (KLOR-CON M) 20 MEQ CR tablet, Take 20 mEq by mouth daily  sulfamethoxazole-trimethoprim (BACTRIM DS) 800-160 MG tablet, Take 1 tablet by mouth Every Mon, Wed, Fri Morning               Physical Exam:   Head and neck exam:  WNL, no asymmetry or swelling, no pain on palpation.     Soft Tissue exam:  Tissues are generally erythematous, EXT site on LL has food/bacteria debris in it, but otherwise seems to be healing well. Oral tissues are slightly dry.     Hard Tissue exam:  Patient has severe wear and erosion on teeth. Wears NG at home, does not have it here. Sheered enamel off of #5 facial. Does not appear to extend vertically or across the occlusal surface of the tooth. No pain in vestibular areas, no fistula obviously present. Fracture appears to be limited to enamel based on visual  inspection only. However, there is also a large filling in this tooth.          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 Post-op course complicated encephalopathy, and diffuse weakness, acute to subacute by CVA, afib with RVR, BRENNAN, candidemia/candida empyema, and Code Blue due to bradycardia/asystole and hypotension for significant GIB s/p EGD with adherent clot near PEG tube site s/p clips. Transferred from ICU to medicine on 11/23/2021. Weaned off ventilator and chest tubes now removed. Appears ready for discharge to ARU. , oral exam concerning for risk for aspiration pneumonia, recent extraction, recent chipped tooth.     Assessment and Plan:  #chipped tooth, clinically appears to be contained to enamel based on visual inspection  #Pneumonia risk, aspiration risk. Limited oral cares.   -  Recommended workup includes: no CT at this time is indicated.   -  General Practice Dentistry referral recommended? Not at this time, unless there are changes.   -  Oral and Maxillofacial Surgery referral recommended? No.     Oral Care Plan:    - Due to pt's recent hx of ventilator, respiratory status: oral care is imperative to improved outcomes and reducing risk of aspiration pneumonia and HAP.   -Please order oral hygiene care  - Brushing (with assistance or reminders per pt request) 2-3x/day  - Rinse with, OR deliver Chlorhexidine or other antiseptic mouth rinse via toothette sponge to oral tissues 2-3x/day to reduce bacterial challenge. This should be done at least 30 min after brushing, as TP ingredients inactivate CHX.   -Advised pt to alert  medical team if any changes with UL tooth-- if he notices pain, swelling, or drainage.       Indira Seth  [phone]

## 2021-12-06 NOTE — PROGRESS NOTES
"Neuro: A&Ox4.  Patient and wife expressed concern to attending Dr. Mckeon regarding Blurred vision new in right eye and \"spot\" as well as tremors in hand.  Was encouraged per Dr. Mckeon tremors will improve.  Patient and wife accepting of MD explanation and plan to watch symptoms.  Cardiac: ST . VSS.No fever.  Respiratory: Sating 94%  on RA. Trache Capped.  MYERS and takes a few minutes to recover.  Dr. Mckeon indicated that \"trache would be  Removed by  Thursday.\".   uate urine output. BM X1  Diet/appetite: Tolerating * diet. Eating well.  Activity:  Assist of one  up to chair and in halls.  Use of walker and gait belt.  Pain: Denies pain.     Skin: Incision and chest tube site healing. No redness or swelling or  Drainage.     LDA's: WNL's    Plan: Continue with POC. Notify primary team with changes.    "

## 2021-12-06 NOTE — PROGRESS NOTES
"CLINICAL NUTRITION SERVICES - REASSESSMENT NOTE     Nutrition Prescription    RECOMMENDATIONS FOR MDs/PROVIDERS TO ORDER:  1. Consider prn bowel regimen   2. Adjust free water flushes via feeding tube as needed, pending fluid status.    3. Rec additional vitamin D supplementation if lab is low (lab ordered for 12/4)    Malnutrition Status:    Severe malnutrition in the context of acute on chronic illness    Recommendations already ordered by Registered Dietitian (RD):  Continue EN as ordered  Continue Reg diet + Ensure clear btw meals    Future/Additional Recommendations:  1. Continue calcium/vitamin D twice daily and multivitamin/minerals.  2. Continue folgard as per team.   3. Monitor [place] count results 12/5-12/7 to determine if TF rate could decrease.   4. Monitor stooling, wt, and labs.     EVALUATION OF THE PROGRESS TOWARD GOALS   Diet: Regular + Ensure clear btw meals  Nutrition Support: Vital 1.5 (semi-elemental) via J-port of GJ tube at 80 mL/hr x 16 hrs which provides 1280 mL TF, 1920 kcals (30 kcals/kg), 87 g protein (1.4 g protein/kg), 978 mL H2O, 239 g CHO, and 8+ g fiber daily. Continue Nutrisource Fiber, 2 pkts daily. Each packet of Nutrisource Fiber provides 3 g soluble fiber, 15 kcals, 4 g CHO, and 60 mL H2O.      EN intake:    11/29-12/2: Pivot 1.5 provided 843 mL, 1265 kcals (20 kcals/kg), 79 g protein (1.2 g pro/kg) daily which provided 67% of estimated kcal needs and 93% of estimated protein needs. 12/3-12/5: Vital 1.5 provided 1040 mL, 1560 kcals (24 kcals/kg), 71 g protein (1.1 g pro/kg) daily which provided 82% of estimated kcal needs and 84% of estimated protein needs.     Per RN note 12/5, \"Regular diet with no appetite\".    PO Intake: Intake: Per RN 11/30, \"little appetite but eating some throughout the day.\" Lack of appetite, early satiety per pt report.               Kcal counts:              11/30      # Meals Ordered from Kitchen: 2 meals. # Meals Recorded: no intake recorded. # " Supplements Recorded: no intake recorded.               12/1       Total Kcals: 331       Total Protein: 19g. # Meals Ordered from Kitchen: 1 meal. # Meals Recorded: 1 meal (First - 100% sausage greg, 50% omelet w/ guevara, cheese & onion, 25% coffee) # Supplements Recorded: 0              12/2        # Meals Ordered from Kitchen: 1 meal. # Meals Recorded: No food intake recorded. # Supplements Recorded: No food intake recorded. Per Epic Food Report, Pt consumed 75% at 12PM but there is not enough information to calculate calories/protein.              * No data available 11/30 and 12/2. On 12/1, not meeting estimated needs of of 1900 - 2400 kcal/day (30 - 37 kcal/kg) and 85 - 130+ g protein/day (1.3 - 2+ g/kg/day).         Calorie Counts scheduled 12/5-12/7    NEW FINDINGS   Weight:   66 kg (145 lb 8.1 oz) - wt stable since assessment one week ago. Overall, wt down 10% since admit.    Labs:     Cr 0.65 (L)  Mg 1.4 (L)  BG   Awaiting Vit D lab ordered 12/4    Medications:    Caltrate  Folgard  Lasix  HSSI  Lantus pen @ 30 unit(s)   Mag-Ox  Mag sulfate  Mycophenolate  Prednisone  Tacrolimus    GI:  Last BM noted 12/4. BM x 1-2 daily prior to 12/4.     MALNUTRITION  % Intake: Decreased intake does not meet criteria - pt is on TF  % Weight Loss: 7.5% in 3 months (severe)  Subcutaneous Fat Loss: Facial region: Mild, Upper arm: Moderate, Lower arm: Mild and Thoracic/intercostal: Mild  Muscle Loss: Temporal: Mild, Scapular bone: Moderate, Thoracic region (clavicle, acromium bone, deltoid, trapezius, pectoral): Mild, Upper arm (bicep, tricep): Moderate, Lower arm  (forearm): Mild, Dorsal hand: Moderate, Upper leg (quadricep, hamstring): Mild, Patellar region: Mild and Posterior calf: Severe  Fluid Accumulation/Edema: Does not meet criteria- 1+ (trace)  Malnutrition Diagnosis: Severe malnutrition in the context of acute on chronic illness    Previous Goals   Total avg nutritional intake to meet a minimum of  30 kcal/kg  and 1.3 g PRO/kg daily (per dosing wt 64 kg).  Evaluation: Not met    Previous Nutrition Diagnosis  Malnutrition related to hx of previous inadequate nutrition intake as evidenced by wt loss, fat loss, muscle loss.     Evaluation: No change    CURRENT NUTRITION DIAGNOSIS  Malnutrition related to hx of previous inadequate nutrition intake as evidenced by wt loss, fat loss, muscle loss.       INTERVENTIONS  Implementation  Continue EN as ordered    Goals  Total avg nutritional intake to meet a minimum of 30 kcal/kg and 1.3 g PRO/kg daily (per dosing wt 64 kg).    Monitoring/Evaluation  Progress toward goals will be monitored and evaluated per protocol.    Betina Traylor RDN, LD  6[address] Pg [phone]

## 2021-12-06 NOTE — PROGRESS NOTES
Calorie Count  Intake recorded for: 12/5  Total Kcals: 240 Total Protein: 5g  Kcals from Hospital Food: 240   Protein: 5g  Kcals from Outside Food (average):0 Protein: 0g  # Meals Ordered from Kitchen: 1 meal   # Meals Recorded: 0 meals  # Supplements Recorded: 100% 1 Ensure Clear

## 2021-12-06 NOTE — PLAN OF CARE
Temp: 98.2  F (36.8  C) Temp src: Oral BP: (!) 122/95 Pulse: 110   Resp: 20 SpO2: 93 % O2 Device: None (Room air)       D: Admitted with ILD complications, now s/p BSLT 10/16 c/b CVA, encephalopathy, acute resp failure, afib RVR, BRENNAN, candida empyema, pleural effusion s/p CT, PEA arrest d/t GIB 11/12. Hx NSIP, RA, bronchiectasis, pHTN, SALTY, HTN, HLD, PLD, hypogammaglobinemia, duodenal anomaly, anxiety, depression     I/A: Bret is A&O x4, withdrawn. Tele in place, SR-T. VSS on RA, very SOB with activity. Trach in place, capped. PICC in place, hep locked. PEG infusing TF at 80ml/hr (cycled from 6450-0781). No PRN pain or anxiety meds needed this shift. Up with assist x1. Slept well overnight     P: Continue to monitor and follow POC. Possible trach decannulation 12/6. Notify Semaj Jade with changes

## 2021-12-07 ENCOUNTER — APPOINTMENT (OUTPATIENT)
Dept: OCCUPATIONAL THERAPY | Facility: CLINIC | Age: 56
End: 2021-12-07
Attending: STUDENT IN AN ORGANIZED HEALTH CARE EDUCATION/TRAINING PROGRAM
Payer: COMMERCIAL

## 2021-12-07 ENCOUNTER — APPOINTMENT (OUTPATIENT)
Dept: PHYSICAL THERAPY | Facility: CLINIC | Age: 56
End: 2021-12-07
Attending: STUDENT IN AN ORGANIZED HEALTH CARE EDUCATION/TRAINING PROGRAM
Payer: COMMERCIAL

## 2021-12-07 ENCOUNTER — APPOINTMENT (OUTPATIENT)
Dept: GENERAL RADIOLOGY | Facility: CLINIC | Age: 56
End: 2021-12-07
Attending: INTERNAL MEDICINE
Payer: COMMERCIAL

## 2021-12-07 ENCOUNTER — HOME INFUSION (PRE-WILLOW HOME INFUSION) (OUTPATIENT)
Dept: PHARMACY | Facility: CLINIC | Age: 56
End: 2021-12-07
Payer: COMMERCIAL

## 2021-12-07 LAB
ALBUMIN SERPL-MCNC: 2.3 G/DL (ref 3.4–5)
ALP SERPL-CCNC: 106 U/L (ref 40–150)
ALT SERPL W P-5'-P-CCNC: 27 U/L (ref 0–70)
ANION GAP SERPL CALCULATED.3IONS-SCNC: 7 MMOL/L (ref 3–14)
AST SERPL W P-5'-P-CCNC: 22 U/L (ref 0–45)
ATRIAL RATE - MUSE: 88 BPM
BACTERIA SPT CULT: NORMAL
BILIRUB SERPL-MCNC: <0.1 MG/DL (ref 0.2–1.3)
BUN SERPL-MCNC: 31 MG/DL (ref 7–30)
CALCIUM SERPL-MCNC: 8.8 MG/DL (ref 8.5–10.1)
CHLORIDE BLD-SCNC: 107 MMOL/L (ref 94–109)
CO2 SERPL-SCNC: 25 MMOL/L (ref 20–32)
CREAT SERPL-MCNC: 0.68 MG/DL (ref 0.66–1.25)
DIASTOLIC BLOOD PRESSURE - MUSE: NORMAL MMHG
DONOR IDENTIFICATION: NORMAL
DQB5: 1873
DSA COMMENTS: NORMAL
DSA PRESENT: YES
DSA TEST METHOD: NORMAL
ERYTHROCYTE [DISTWIDTH] IN BLOOD BY AUTOMATED COUNT: 14.6 % (ref 10–15)
GFR SERPL CREATININE-BSD FRML MDRD: >90 ML/MIN/1.73M2
GLUCOSE BLD-MCNC: 174 MG/DL (ref 70–99)
GLUCOSE BLDC GLUCOMTR-MCNC: 112 MG/DL (ref 70–99)
GLUCOSE BLDC GLUCOMTR-MCNC: 133 MG/DL (ref 70–99)
GLUCOSE BLDC GLUCOMTR-MCNC: 163 MG/DL (ref 70–99)
GLUCOSE BLDC GLUCOMTR-MCNC: 183 MG/DL (ref 70–99)
GLUCOSE BLDC GLUCOMTR-MCNC: 211 MG/DL (ref 70–99)
GRAM STAIN RESULT: NORMAL
HCT VFR BLD AUTO: 29 % (ref 40–53)
HGB BLD-MCNC: 9 G/DL (ref 13.3–17.7)
INR PPP: 2.51 (ref 0.85–1.15)
INTERPRETATION ECG - MUSE: NORMAL
MAGNESIUM SERPL-MCNC: 1.9 MG/DL (ref 1.6–2.3)
MCH RBC QN AUTO: 29.9 PG (ref 26.5–33)
MCHC RBC AUTO-ENTMCNC: 31 G/DL (ref 31.5–36.5)
MCV RBC AUTO: 96 FL (ref 78–100)
ORGAN: NORMAL
P AXIS - MUSE: 6 DEGREES
PHOSPHATE SERPL-MCNC: 3.4 MG/DL (ref 2.5–4.5)
PLATELET # BLD AUTO: 128 10E3/UL (ref 150–450)
POTASSIUM BLD-SCNC: 4.8 MMOL/L (ref 3.4–5.3)
PR INTERVAL - MUSE: 124 MS
PROT SERPL-MCNC: 5.8 G/DL (ref 6.8–8.8)
QRS DURATION - MUSE: 58 MS
QT - MUSE: 312 MS
QTC - MUSE: 377 MS
R AXIS - MUSE: 35 DEGREES
RBC # BLD AUTO: 3.01 10E6/UL (ref 4.4–5.9)
SA 1 CELL: NORMAL
SA 1 TEST METHOD: NORMAL
SA 2 CELL: NORMAL
SA 2 TEST METHOD: NORMAL
SA1 HI RISK ABY: NORMAL
SA1 MOD RISK ABY: NORMAL
SA2 HI RISK ABY: NORMAL
SA2 MOD RISK ABY: NORMAL
SODIUM SERPL-SCNC: 139 MMOL/L (ref 133–144)
SYSTOLIC BLOOD PRESSURE - MUSE: NORMAL MMHG
T AXIS - MUSE: 17 DEGREES
UNACCEPTABLE ANTIGENS: NORMAL
UNOS CPRA: 0
VENTRICULAR RATE- MUSE: 88 BPM
WBC # BLD AUTO: 8.7 10E3/UL (ref 4–11)
ZZZSA 1  COMMENTS: NORMAL
ZZZSA 2 COMMENTS: NORMAL

## 2021-12-07 PROCEDURE — 85027 COMPLETE CBC AUTOMATED: CPT | Performed by: STUDENT IN AN ORGANIZED HEALTH CARE EDUCATION/TRAINING PROGRAM

## 2021-12-07 PROCEDURE — 250N000013 HC RX MED GY IP 250 OP 250 PS 637: Performed by: PHYSICIAN ASSISTANT

## 2021-12-07 PROCEDURE — 250N000011 HC RX IP 250 OP 636: Performed by: STUDENT IN AN ORGANIZED HEALTH CARE EDUCATION/TRAINING PROGRAM

## 2021-12-07 PROCEDURE — 93010 ELECTROCARDIOGRAM REPORT: CPT | Performed by: INTERNAL MEDICINE

## 2021-12-07 PROCEDURE — 71045 X-RAY EXAM CHEST 1 VIEW: CPT | Mod: 26 | Performed by: RADIOLOGY

## 2021-12-07 PROCEDURE — 97116 GAIT TRAINING THERAPY: CPT | Mod: GP

## 2021-12-07 PROCEDURE — 250N000013 HC RX MED GY IP 250 OP 250 PS 637: Performed by: STUDENT IN AN ORGANIZED HEALTH CARE EDUCATION/TRAINING PROGRAM

## 2021-12-07 PROCEDURE — 97530 THERAPEUTIC ACTIVITIES: CPT | Mod: GP

## 2021-12-07 PROCEDURE — 250N000012 HC RX MED GY IP 250 OP 636 PS 637: Performed by: INTERNAL MEDICINE

## 2021-12-07 PROCEDURE — 250N000012 HC RX MED GY IP 250 OP 636 PS 637: Performed by: PHYSICIAN ASSISTANT

## 2021-12-07 PROCEDURE — 71045 X-RAY EXAM CHEST 1 VIEW: CPT

## 2021-12-07 PROCEDURE — 97530 THERAPEUTIC ACTIVITIES: CPT | Mod: GO

## 2021-12-07 PROCEDURE — 85610 PROTHROMBIN TIME: CPT | Performed by: STUDENT IN AN ORGANIZED HEALTH CARE EDUCATION/TRAINING PROGRAM

## 2021-12-07 PROCEDURE — 99233 SBSQ HOSP IP/OBS HIGH 50: CPT | Performed by: STUDENT IN AN ORGANIZED HEALTH CARE EDUCATION/TRAINING PROGRAM

## 2021-12-07 PROCEDURE — 250N000013 HC RX MED GY IP 250 OP 250 PS 637: Performed by: ANESTHESIOLOGY

## 2021-12-07 PROCEDURE — 214N000001 HC R&B CCU UMMC

## 2021-12-07 PROCEDURE — 250N000013 HC RX MED GY IP 250 OP 250 PS 637: Performed by: THORACIC SURGERY (CARDIOTHORACIC VASCULAR SURGERY)

## 2021-12-07 PROCEDURE — 94640 AIRWAY INHALATION TREATMENT: CPT

## 2021-12-07 PROCEDURE — 84100 ASSAY OF PHOSPHORUS: CPT | Performed by: STUDENT IN AN ORGANIZED HEALTH CARE EDUCATION/TRAINING PROGRAM

## 2021-12-07 PROCEDURE — 250N000009 HC RX 250: Performed by: INTERNAL MEDICINE

## 2021-12-07 PROCEDURE — 250N000012 HC RX MED GY IP 250 OP 636 PS 637: Performed by: STUDENT IN AN ORGANIZED HEALTH CARE EDUCATION/TRAINING PROGRAM

## 2021-12-07 PROCEDURE — 80053 COMPREHEN METABOLIC PANEL: CPT | Performed by: STUDENT IN AN ORGANIZED HEALTH CARE EDUCATION/TRAINING PROGRAM

## 2021-12-07 PROCEDURE — 99233 SBSQ HOSP IP/OBS HIGH 50: CPT | Performed by: INTERNAL MEDICINE

## 2021-12-07 PROCEDURE — 250N000013 HC RX MED GY IP 250 OP 250 PS 637: Performed by: NURSE PRACTITIONER

## 2021-12-07 PROCEDURE — 93005 ELECTROCARDIOGRAM TRACING: CPT

## 2021-12-07 PROCEDURE — 97535 SELF CARE MNGMENT TRAINING: CPT | Mod: GO

## 2021-12-07 PROCEDURE — 36592 COLLECT BLOOD FROM PICC: CPT | Performed by: STUDENT IN AN ORGANIZED HEALTH CARE EDUCATION/TRAINING PROGRAM

## 2021-12-07 PROCEDURE — 250N000009 HC RX 250: Performed by: STUDENT IN AN ORGANIZED HEALTH CARE EDUCATION/TRAINING PROGRAM

## 2021-12-07 PROCEDURE — 83735 ASSAY OF MAGNESIUM: CPT | Performed by: STUDENT IN AN ORGANIZED HEALTH CARE EDUCATION/TRAINING PROGRAM

## 2021-12-07 RX ORDER — WARFARIN SODIUM 1 MG/1
1 TABLET ORAL
Status: COMPLETED | OUTPATIENT
Start: 2021-12-07 | End: 2021-12-07

## 2021-12-07 RX ADMIN — ESCITALOPRAM OXALATE 10 MG: 10 TABLET ORAL at 08:46

## 2021-12-07 RX ADMIN — PANTOPRAZOLE SODIUM 40 MG: 40 TABLET, DELAYED RELEASE ORAL at 08:45

## 2021-12-07 RX ADMIN — QUETIAPINE FUMARATE 25 MG: 25 TABLET ORAL at 08:45

## 2021-12-07 RX ADMIN — FLUCONAZOLE IN SODIUM CHLORIDE 400 MG: 2 INJECTION, SOLUTION INTRAVENOUS at 09:29

## 2021-12-07 RX ADMIN — ACETAMINOPHEN 975 MG: 325 TABLET, FILM COATED ORAL at 21:09

## 2021-12-07 RX ADMIN — QUETIAPINE FUMARATE 25 MG: 25 TABLET ORAL at 14:33

## 2021-12-07 RX ADMIN — SULFAMETHOXAZOLE AND TRIMETHOPRIM 1 TABLET: 400; 80 TABLET ORAL at 08:46

## 2021-12-07 RX ADMIN — MYCOPHENOLATE MOFETIL 1000 MG: 250 CAPSULE ORAL at 17:18

## 2021-12-07 RX ADMIN — LEVOTHYROXINE SODIUM 25 MCG: 0.03 TABLET ORAL at 08:46

## 2021-12-07 RX ADMIN — LEVALBUTEROL HYDROCHLORIDE 1.25 MG: 1.25 SOLUTION RESPIRATORY (INHALATION) at 08:24

## 2021-12-07 RX ADMIN — ACETAMINOPHEN 975 MG: 325 TABLET, FILM COATED ORAL at 08:42

## 2021-12-07 RX ADMIN — Medication 400 MG: at 19:35

## 2021-12-07 RX ADMIN — ACETAMINOPHEN 975 MG: 325 TABLET, FILM COATED ORAL at 14:33

## 2021-12-07 RX ADMIN — AMLODIPINE BESYLATE 5 MG: 2.5 TABLET ORAL at 08:46

## 2021-12-07 RX ADMIN — WARFARIN SODIUM 1 MG: 1 TABLET ORAL at 17:18

## 2021-12-07 RX ADMIN — QUETIAPINE FUMARATE 50 MG: 50 TABLET ORAL at 19:34

## 2021-12-07 RX ADMIN — PANTOPRAZOLE SODIUM 40 MG: 40 TABLET, DELAYED RELEASE ORAL at 16:34

## 2021-12-07 RX ADMIN — NYSTATIN 1000000 UNITS: 500000 SUSPENSION ORAL at 12:05

## 2021-12-07 RX ADMIN — INSULIN ASPART 3 UNITS: 100 INJECTION, SOLUTION INTRAVENOUS; SUBCUTANEOUS at 19:38

## 2021-12-07 RX ADMIN — Medication 1 TABLET: at 08:45

## 2021-12-07 RX ADMIN — NYSTATIN 1000000 UNITS: 500000 SUSPENSION ORAL at 17:17

## 2021-12-07 RX ADMIN — Medication 1 PACKET: at 19:35

## 2021-12-07 RX ADMIN — CALCIUM CARBONATE 600 MG (1,500 MG)-VITAMIN D3 400 UNIT TABLET 1 TABLET: at 17:18

## 2021-12-07 RX ADMIN — ROSUVASTATIN CALCIUM 10 MG: 10 TABLET, FILM COATED ORAL at 08:45

## 2021-12-07 RX ADMIN — PREDNISONE 10 MG: 10 TABLET ORAL at 08:44

## 2021-12-07 RX ADMIN — Medication 10 MG: at 19:34

## 2021-12-07 RX ADMIN — NYSTATIN 1000000 UNITS: 500000 SUSPENSION ORAL at 21:09

## 2021-12-07 RX ADMIN — INSULIN ASPART 2 UNITS: 100 INJECTION, SOLUTION INTRAVENOUS; SUBCUTANEOUS at 04:10

## 2021-12-07 RX ADMIN — Medication 400 MG: at 12:05

## 2021-12-07 RX ADMIN — CEFTAZIDIME 2 G: 2 INJECTION, POWDER, FOR SOLUTION INTRAVENOUS at 12:05

## 2021-12-07 RX ADMIN — MYCOPHENOLATE MOFETIL 1000 MG: 250 CAPSULE ORAL at 08:43

## 2021-12-07 RX ADMIN — CEFTAZIDIME 2 G: 2 INJECTION, POWDER, FOR SOLUTION INTRAVENOUS at 04:03

## 2021-12-07 RX ADMIN — SODIUM CHLORIDE, PRESERVATIVE FREE 5 ML: 5 INJECTION INTRAVENOUS at 06:01

## 2021-12-07 RX ADMIN — INSULIN GLARGINE 30 UNITS: 100 INJECTION, SOLUTION SUBCUTANEOUS at 08:47

## 2021-12-07 RX ADMIN — Medication 1 PACKET: at 08:54

## 2021-12-07 RX ADMIN — INSULIN GLARGINE 30 UNITS: 100 INJECTION, SOLUTION SUBCUTANEOUS at 19:37

## 2021-12-07 RX ADMIN — NYSTATIN 1000000 UNITS: 500000 SUSPENSION ORAL at 08:42

## 2021-12-07 RX ADMIN — CEFTAZIDIME 2 G: 2 INJECTION, POWDER, FOR SOLUTION INTRAVENOUS at 19:35

## 2021-12-07 RX ADMIN — TACROLIMUS 2.5 MG: 1 CAPSULE ORAL at 17:18

## 2021-12-07 RX ADMIN — PREDNISONE 10 MG: 10 TABLET ORAL at 19:35

## 2021-12-07 RX ADMIN — LEVOFLOXACIN 750 MG: 5 INJECTION, SOLUTION INTRAVENOUS at 16:34

## 2021-12-07 RX ADMIN — ALBUTEROL SULFATE 2.5 MG: 2.5 SOLUTION RESPIRATORY (INHALATION) at 19:47

## 2021-12-07 RX ADMIN — TACROLIMUS 2.5 MG: 1 CAPSULE ORAL at 08:43

## 2021-12-07 RX ADMIN — ACETYLCYSTEINE 2 ML: 200 SOLUTION ORAL; RESPIRATORY (INHALATION) at 08:24

## 2021-12-07 RX ADMIN — INSULIN ASPART 1 UNITS: 100 INJECTION, SOLUTION INTRAVENOUS; SUBCUTANEOUS at 00:40

## 2021-12-07 RX ADMIN — CYCLOBENZAPRINE HYDROCHLORIDE 10 MG: 5 TABLET, FILM COATED ORAL at 21:10

## 2021-12-07 RX ADMIN — FUROSEMIDE 40 MG: 40 TABLET ORAL at 08:44

## 2021-12-07 RX ADMIN — CALCIUM CARBONATE 600 MG (1,500 MG)-VITAMIN D3 400 UNIT TABLET 1 TABLET: at 08:45

## 2021-12-07 ASSESSMENT — MIFFLIN-ST. JEOR: SCORE: 1403.25

## 2021-12-07 ASSESSMENT — ACTIVITIES OF DAILY LIVING (ADL)
ADLS_ACUITY_SCORE: 20
ADLS_ACUITY_SCORE: 16
ADLS_ACUITY_SCORE: 16
ADLS_ACUITY_SCORE: 20
ADLS_ACUITY_SCORE: 16
ADLS_ACUITY_SCORE: 20
ADLS_ACUITY_SCORE: 20
ADLS_ACUITY_SCORE: 16
ADLS_ACUITY_SCORE: 20
ADLS_ACUITY_SCORE: 16
ADLS_ACUITY_SCORE: 16
ADLS_ACUITY_SCORE: 20
ADLS_ACUITY_SCORE: 20
ADLS_ACUITY_SCORE: 17
ADLS_ACUITY_SCORE: 20
ADLS_ACUITY_SCORE: 18
ADLS_ACUITY_SCORE: 17
ADLS_ACUITY_SCORE: 16
ADLS_ACUITY_SCORE: 20
ADLS_ACUITY_SCORE: 20
ADLS_ACUITY_SCORE: 18
ADLS_ACUITY_SCORE: 17

## 2021-12-07 NOTE — PROGRESS NOTES
Therapy: IV ABX   Insurance: [facility]   Ded: $1000.00  Met: $1000.00    Co-Insurance: 70/30  Max Out of Pocket: $6000.00  Met: $6000.00  Patient now covered 100% since his ded and oop are met       Please contact Intake with any questions, [phone] or In Basket pool, [facility] ([phone]).

## 2021-12-07 NOTE — PROGRESS NOTES
Calorie Count  Intake recorded for: 12/6  Total Kcals: 1110 Total Protein: 55g  Kcals from Hospital Food: 870  Protein: 44g  Kcals from Outside Food (average):240 Protein: 11g  # Meals Ordered from Kitchen: food from outside the hospital   # Meals Recorded: 1 meal - 100% flour tortilla w/ cheese - from outside the hospital  # Supplements Recorded: 100% 3 Ensure Clears, 1 Gelatein Plus

## 2021-12-07 NOTE — PLAN OF CARE
Neuro: A&Ox4.   Cardiac: SR. VSS.   Respiratory: Sating WNL on RA.  GI/: Adequate urine output. No BM.  Diet/appetite: Regular diet.  Activity:  Up ad [place] in room.  Pain: Tylenol given for headache x1.  Skin: No  deficits noted.  LDA's: PIV x1, NS locked.    Plan: Continue with POC. Notify primary team with changes.

## 2021-12-07 NOTE — PLAN OF CARE
Cares from [id number]    D: Admitted on 9/5/21 from OSH for acute on chronic respiratory failure 2/2 ILD exacerbation, now s/p BSLT on 10/16/21.  Prolonged vent wean s/p trach and PEG/J tube placement with thoracic surgery 10/29.  Post-op course otherwise complicated by encephalopathy and diffuse weakness, acute to subacute CVA, afib with RVR, BRENNAN, GI bleed, Candidemia/Candida empyema, and anxiety.  Code called 11/2 for bradycardia/asystole, progressive hypotensive, found to have significant GI bleed, EGD with adherent clot near PEG tube site that was clipped. Continuous TD since 11/20, weaned to RA 11/28, and tolerating continuous trach capping since 11/30    PMH significant for NSIP/ILD, bronchiectasis, moderate PH, RA, SALTY, chronic HSV infection, hypogammaglobulinemia, steroid-induced diabetes, hypothyroidism, PFO, HTN, HLD, duodenal anomaly, anxiety, and depression    I: Monitored vitals and assessed pt status. Encouraged activity.      Changed: No changes for the four hours. Nystatin swish and swallow spilt x2 on accident.  Running: TKO; Since pt was with PT TF was started at 1630 so they will go until 0830 12/8.   PRN: NA  Tele: ST 100s  O2: RA  Mobility: Assist one walker and gait belt. Pt worked with PT at 1500.      A: A&Ox4. VSS. Afebrile. Pt is tired from working with PT/OT today. Neuro intact. Lungs are clear to dim. SOB with exertion and gets anxious when breathing hard. Pt needs to continue to work on slowing breaths. Pt said today was a bad day mentally as he is very tired. Fiance brought in a hot pocket for dinner. Pt taking meds whole. TF running and will run until 0830 12/8. Urinal voiding. BM on commode x1 with therapy. Loose BM brown.      P: Continue to monitor pt status and report changes to treatment team.

## 2021-12-07 NOTE — PLAN OF CARE
Neuro: A&Ox4. Withdrawn.  Cardiac: ST VSS.   Respiratory: Sating WNL on RA.Tracheostomy capped. MYERS.  GI/: Adequate urine output.   Diet/appetite: TFs at 80ml/hr from [id number]. Regular diet ordered.  Activity:  Not OOB.  Pain: Denies.  Skin: Incision approximated.  LDA's: DL PICC. G/J tube.    Plan: Continue with POC. Notify primary team with changes.

## 2021-12-07 NOTE — PLAN OF CARE
Neuro: A&Ox4. Withdrawn but pleasant.  Cardiac: Afebrile, ST, BPs stable.   Respiratory: RA. Bivona #6 capped since last week, site cleaned and new dressing applied this evening. MYERS. Unproductive cough.   GI/: Voiding spontaneously but small amounts. No BM this shift, but reported diarrhea earlier today and has had diarrhea past three days.  Diet/appetite: Regular diet but little appetite. Tolerating ensures throughout the day and tried gelatein this evening. Calorie counts through tomorrow.Denies nausea. Cyclical TF 4p-8a via J port of PEG.  Activity: Up with A1, GB, walker. Spent most of day in chair, declined OOB to chair this evening.  Pain: . Minimal but chronic back pain, some clamshell pain. Declines scheduled tylenol and prn oxycodone. Takes scheduled bedtime flexeril.  Skin: Healed clamshell. Scabbed old CT sites. Abdominal and arm bruising from subcutaneous shots.  Lines: TL PICC Left arm. IV infusing for intermittent abx.   Replacements: Mg replaced on day shift. Rechecks in AM.

## 2021-12-07 NOTE — PROGRESS NOTES
Pulmonary Medicine  Cystic Fibrosis - Lung Transplant Team  Progress Note  2021       Patient: Edson Thornton  MRN: [id number]  : 1965 (age 56 year old)  Transplant: 10/16/2021 (Lung), POD#51)  Admission date: 2021    Assessment & Plan:     Edson Thornton is a 56 year old male with a PMH significant for NSIP/ILD, bronchiectasis, moderate PH, RA, SALTY, chronic HSV infection, hypogammaglobulinemia, steroid-induced diabetes, hypothyroidism, PFO, HTN, HLD, duodenal anomaly, anxiety, and depression.  Admitted on 21 from OSH for acute on chronic respiratory failure 2/2 ILD exacerbation, now s/p BSLT on 10/16/21.  Prolonged vent wean s/p trach and PEG/J tube placement with thoracic surgery 10/29.  Post-op course otherwise complicated by encephalopathy and diffuse weakness, acute to subacute CVA, afib with RVR, BRENNAN, GI bleed, Candidemia/Candida empyema, and anxiety.  Code called  for bradycardia/asystole, progressive hypotensive, found to have significant GI bleed, EGD with adherent clot near PEG tube site that was clipped.  Ongoing improvement in weakness and pain management.  Continuous TD since , weaned to RA , and tolerating continuous trach capping since .     Today's recommendations:  - Continuet to cap trach. Will assess for decannulation daily.   - Agree with continuing diuresis.   - Tacrolimus level was slightly subtherapeutic at 7.7 this morning. Dose increased to 2.5mg BID.  Next level on  (ordered).   - Prednisone taper on  (not ordered).  - CMV and EBV ordered   - Coccidioides Ag (blood) ordered   - Sputum cultures () NGTD  - ABX duration per transplant ID (will need repeat chest CT around time of transplant ID follow up )  - Continue fluconazole through  per transplant ID, EKG for QTc monitoring ordered   - IgG ordered 12/15  - DSA ordered   - Monitor ability to increase oral magnesium supplementation pending  stools     S/p BSLT for ILD:  Acute hypoxic respiratory failure, Resolved:   Prolonged vent weaning s/p trach:  Bilateral pleural effusions: Unfortunately had not received vaccination for flu, PNA, or COVID-19 PTA.  Explant pathology with NSIP, no malignancy.  PGD 2-3.  Weaned off paralytic 10/19 (for vent dyssynchrony) and Caroline 10/22.  Initial difficulty weaning sedation given agitation then with neurological findings as below.  Prolonged vent wean s/p trach and PEG/J tube placement with thoracic surgery 10/29.  Code called 11/2 for bradycardia/asystole (required 1 round of CPR, no medications) then progressively hypotensive, GI bleed as below.  Chest CT 11/2 with increased bilateral pleural effusions (moderate left, small right), bibasilar atelectasis with area of hypoenhancing parenchyma in LLL (suspicious for infection), and numerous nondisplaced anterior rib fractures bilaterally.  S/p left chest tube placement in IR 11/3 for pleural effusion/empyema (as below), right deferred given very little effusion on US.  S/p lytics 11/25-11/28 (CXR showing trapped left lung) and right thoracentesis 11/27.  Left chest tube removed 11/30.  Problems with trach cuff leak 11/3 (requiring multiple exchanges), bronch 11/14 with trach in good position with cuff well sealed in trachea and small to moderate secretions mostly in lower lobes.  Chest CT 11/22 with new cavitary lesion in the left lung base with multifocal GGO in bilateral upper lobes, new 1.8 cm fluid collection with surrounding fat stranding in the left axilla, decreased bilateral loculated pleural effusions, and similar small pericardial effusion.  Continuous TD since 11/20, weaned to RA 11/28, and tolerating continuous trach capping since 11/30 (last needed oxygen 12/1).    - Right pleural cultures (11/27) NGTD  - Nebs: levalbuterol and Mucomyst BID  - Pulmonary toilet with chest physiotherapy BID  - Continue capping trach as tolerated, supplemental oxygen as needed  to maintain SpO2 >92%  - Will revisit trach decannulation in the next 1-2 days  - Volume management per primary team, diuresis today  - CXR today with moderate left-sided loculated pleural effusion, stable small right-sided pleural effusion, and bibasilar atelectasis (personally reviewed with Dr. Sifuentes), repeat 12/7(ordered)  - Defer bronch, revisit PRN  - Pain management per primary team  - May need overnight oximetry study prior to discharge     Immunosuppression: s/p induction therapy with basiliximab 10/16 (and high dose IV steroid) and 10/20  - Tacrolimus 2.5 mg qAM / 2 mg qPM (increased 11/30, suspension, via G tube).  Goal level 8-12. Repeat level on 12/9.  - MMF 1000 mg BID (11/2, decreased given GI bleed, AZA to be avoided given TPMT)  - Prednisolone 10 mg qAM / 10 mg qPM, next taper due 1/2/22  Date AM dose (mg) PM dose (mg)   11/21/21 12.5 10   12/5/21 10 10   1/2/22 10 7.5   1/30/22 7.5 7.5   2/27/22 7.5 5   3/27/22 5 5   4/24/22 5 2.5      Prophylaxis:   - Bactrim for PJP ppx (held 11/2-11/5 d/t BRENNAN)  - Nystatin for oral candidiasis ppx, 6 month course  - See below for serologies and viral ppx:    Donor Recipient Intervention   CMV status Negative Negative None, CMV monthly (due 12/14, neg 11/16)   EBV status Positive Positive None, EBV monthly (due 12/14, neg 11/16)   HSV status N/A Positive S/p acyclovir POD #1-30 (recent infection history pre-txp)      ID: Concern for possible Strongyloides exposure pre-transplant s/p ivermectin x1 dose (9/17).  Donor and recipient cultures NGTD.  S/p IV ceftazidime/vancomycin for 48h per protocol and additional empiric ceftazidime 10/19-10/23 given recurrent fevers as below.  Cryptococcal Ag negative 10/29.  Reports some blood mixed in sputum 12/3.   - Monthly blood Coccidioides Ag x12 months post-transplant per ID (urine and serum negative 11/17), due 12/17 (ordered)  - Bacterial and fungal sputum cultures 12/5  - NGTD.     Recurring fevers, Resolved:  Fevers  post-op, Tmax 101.7 POD #1.  Febrile with worsening leukocytosis again 10/25, generally persisting.  LP (10/29), xanthochromic with pleocytosis thought to be appropriate given RBC and WBCs, no ABX recommended per transplant ID and neurology.  S/p empiric meropenem 10/28-11/2.       Klebsiella pneumoniae:  Pseudomonas fluorescens/putida:   LLL cavity: Klebsiella initially noted trach sputum culture 11/10.  Started on ceftriaxone 11/11, transitioned to ertapenem 11/12-11/13, and back to ceftriaxone 11/14.  Bronch culture 11/12 with Klebsiella and Pseudomonas fluorescens/putida (R-meropenem).  Chest CT 11/22 with LLL cavity as above, BAL with Klebsiella pneumoniae.  Histo Ag 11/22, 11/23 and Blast Ag 11/22 negative.  - ABX: ceftazidime (11/23-12/21) and levofloxacin (11/23-12/7) per transplant ID; s/p Zosyn (11/15-11/23)  - Transplant ID follow up scheduled 12/21 with repeat chest CT prior      Disseminated Candida: Noted on blood cultures 10/20 and 10/22.  BDG fungitell positive (399) on 10/20.  Respiratory cultures with persistent Candida albicans.  TC 10/23 without evidence of endocarditis.  Ophthalmology consult 10/24 with benign dilated fundoscopic exam.  Candida empyema also noted 10/25, chest tubes inadvertently removed by CVTS 10/28, left chest tube replaced by IR 11/3 as above with ongoing Candida on cultures (11/3, 11/18).  BDG fungitell positive (>500) and Aspergillus galactomannan negative 11/22.  - Fluconazole (10/26-12/21) per transplant ID, repeat EKG for QTc monitoring 12/7 (ordered); s/p micafungin 10/22-10/27     HSV: Chronic intermittent active infection pre-transplant with recent HSV infection: crusted lesions throughout left side of jaw, s/p 10 day treatment course of ACV through 10/9.  HSV PCR blood negative 10/17.  S/p ACV ppx as above (started POD #1 instead of POD #8 given HSV history and location).     Hypogammaglobulinemia: IgG previously low at 364 (9/7).  Noted at 265 at time of  transplant, s/p IVIG 10/21, repeat IgG (11/17) 198, s/p IVIG (with premedication) 11/18.  - Repeat IgG (12/15, ordered)     Positive cross match:   Positive DSA: Note that he received two doses of rituximab in June, which is likely contributing to cross match result.  DSA had been negative through 11/15 and newly positive 11/29 with DQB5 and mfi 2522.  - DSA monitoring weekly (12/6, results pending)     SALTY: Noted pre-transplant.  Home CPAP 6-12 cm H2O.  - Evaluate need for BiPAP after decannulation.     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  MRI 10/23 with multifocal subacute infarcts within both cerebral hemispheres and left cerebellum.  DDx include surgery v embolic v infectious.  Heparin drip started 10/23 (intermittently held with GI bleed as below).  Repeat stroke code 10/25 d/t marked decrease in responsiveness with sedation wean, pupil inequality, and absent gag reflex.  CTA head without obvious new pathology, MRI brain (with and without contrast) primarily revealing for infarct, low likelihood of PRES.  Ammonia normal.  VEEG per neuro with severe diffuse encephalopathy.  Ongoing improvement since 10/29, repeat head CT 11/2 (following code) without new acute intracranial abnormalities.  - AC management per primary team as below   - PT/OT      Afib with RVR: Noted 10/18, started on amiodarone drip and converted to SR, transitioned to PO 10/21, decreased 10/29.  Metoprolol and amiodarone discontinued 11/19 d/t intermittent bradycardia.  AC per primary team.      Right subclavian DVT: Seen on UE US from 11/4.  Heparin drip resumed 11/5, transitioned to warfarin 11/20 and in the setting of thrombocytopenia.  HIT negative  11/21.      Recurrent GI bleed, Resolved: Hemoglobin dropped to 6.6 10/22, s/p 2 units pRBC.  OG tube with bloody output, EGD 10/23 noted NJ/OG tube trauma with scant oozing.  Progressive hypotension 11/2, hemoglobin 5.8 with bloody G tube output.  Heparin drip held, transitioned to PPI drip, and MTP activated.  EGD 11/2 with large amount of clotted blood in stomach and area of raised mucosa with small adherent clot near PEG tube site that was clipped, no active bleeding.  Maroon stools 11/3, repeat EGD with extensive old blood in stomach, no active bleeding, small nodular area with prior clips clipped again.  Most recent EGD 11/5 with ulcer noted at PEG tube bumper site, gastritis, and suction marks from G tube. TF noted in G tube drainage bag 11/7, AXR with GJ tube tip projecting over proximal duodenum. GJ tube exchanged 11/9 by GI.      Elevated LFTs, Resolved: Shock liver post-op, now resolved.  Intermittent alk phos elevation, recently normal.     Hypomagnesemia: Suppressed reabsorption 2/2 CNI.  Requiring intermittent IV and PO replacement.  - Mag-Ox 400 mg BID, monitor ability to increase pending stools    Mild Thrombocytopenia: Improving but not back to baseline. it is stable in 110's.     We appreciate the excellent care provided by the [place] [id number] team.  Recommendations communicated via in person rounding and this note.  Will continue to follow along closely, please do not hesitate to call with any questions or concerns.       Subjective & Interval History:     Breathing is stable. Bothered by Tremors.  Coughed up sputum occ. Reports slightly yellow. Still feels dyspneic with exertion.  Still does not have much of an appetite.      Review of Systems:     Please see HPI, otherwise the complete 10 point ROS is negative.     Physical Exam:     Vital signs:  Temp: 98.6  F (37  C) Temp src: Oral BP: 118/85 Pulse: 108   Resp: 20 SpO2: 95 % O2 Device: None (Room air) Oxygen Delivery: 2 LPM Height: 162.6 cm  "(5' 4.02\") Weight: 66 kg (145 lb 8.1 oz)  I/O:     Intake/Output Summary (Last 24 hours) at 12/5/2021 0930  Last data filed at 12/5/2021 0600  Gross per 24 hour   Intake 2804 ml   Output 2300 ml   Net 504 ml       GENERAL: alert, NAD  HEENT: NCAT, EOMI, no scleral icterus, OMM.   Neck: Bivona #6 TTS in place, cuff down and capped.  Lungs: good air flow, no crackles, rhonchi or wheezing  CV: RRR, S1S2, no murmurs noted  Abdomen: normoactive BS, soft, non tender  Neuro: AAO X 3  Psychiatric: flat affect, good eye contact  Skin: no rash, jaundice or lesions on limited exam  Extremities: No clubbing, cyanosis or edema.      Lines, Drains, and Devices:  PICC Triple Lumen 11/04/21 Left Basilic Access. PICC okay to use. (Active)   Site Assessment WDL 12/05/21 0900   External Cath Length (cm) 1 cm 11/30/21 1600   Extremity Circumference (cm) 28 cm 11/28/21 1038   Dressing Intervention Chlorhexidine patch;Transparent 12/04/21 2000   Dressing Change Due 12/05/21 12/04/21 0945   PICC Comment CDI 12/05/21 0900   Mosley - Status heparin locked 12/05/21 0900   [place] - Cap Change Due 12/07/21 12/04/21 0945   Red - Status infusing 12/05/21 0900   Red - Cap Change Due 12/07/21 12/04/21 0945   White - Status heparin locked 12/05/21 0900   White - Cap Change Due 12/07/21 12/04/21 0945   Extravasation? No 12/04/21 0945   Line Necessity Yes, meets criteria 12/05/21 0900   Number of days: 31     Data:     LABS    CMP:   Recent Labs   Lab 12/06/21  2005 12/06/21  1623 12/06/21  1230 12/06/21  0839 12/06/21  0554 12/05/21  0739 12/05/21  0637 12/04/21  0959 12/04/21  0607 12/03/21  0927 12/03/21  0553   NA  --   --   --   --  141  --  138  --  140  --  138   POTASSIUM  --   --   --   --  4.1  --  4.5  --  4.4  --  4.4   CHLORIDE  --   --   --   --  106  --  105  --  106  --  105   CO2  --   --   --   --  26  --  27  --  27  --  28   ANIONGAP  --   --   --   --  9  --  6  --  7  --  5   * 166* 191* 117* 199*   < > 197*   < > 121*   < > " 222*   BUN  --   --   --   --  23  --  23  --  24  --  37*   CR  --   --   --   --  0.65*  --  0.66  --  0.69  --  0.66   GFRESTIMATED  --   --   --   --  >90  --  >90  --  >90  --  >90   ERIK  --   --   --   --  9.1  --  9.1  --  9.4  --  8.8   MAG  --   --   --   --  1.4*  --  1.7  --  1.8  --  1.3*   PHOS  --   --   --   --  3.1  --  3.2  --  3.0  --  2.4*   PROTTOTAL  --   --   --   --  6.1*  --  5.9*  --  6.2*  --  5.6*   ALBUMIN  --   --   --   --  2.4*  --  2.3*  --  2.4*  --  2.2*   BILITOTAL  --   --   --   --  0.2  --  0.2  --  0.2  --  0.1*   ALKPHOS  --   --   --   --  114  --  109  --  113  --  106   AST  --   --   --   --  26  --  21  --  23  --  21   ALT  --   --   --   --  32  --  27  --  27  --  27    < > = values in this interval not displayed.     CBC:   Recent Labs   Lab 12/06/21 0554 12/05/21 0637 12/04/21 0607 12/03/21  0553   WBC 8.2 9.5 11.0 10.8   RBC 3.19* 2.93* 3.13* 2.82*   HGB 9.6* 9.0* 9.6* 8.6*   HCT 30.4* 28.7* 30.6* 27.4*   MCV 95 98 98 97   MCH 30.1 30.7 30.7 30.5   MCHC 31.6 31.4* 31.4* 31.4*   RDW 14.6 14.6 15.0 15.2*   * 115* 118* 91*       INR:   Recent Labs   Lab 12/06/21 0554 12/05/21 0637 12/04/21 0607 12/03/21  0553   INR 2.35* 2.35* 2.13* 2.32*       Glucose:   Recent Labs   Lab 12/06/21  2005 12/06/21  1623 12/06/21  1230 12/06/21  0839 12/06/21  0554 12/06/21  0525   * 166* 191* 117* 199* 193*       Blood Gas:   Recent Labs   Lab 12/01/21  0725   PHV 7.42   PCO2V 50   PO2V 43   HCO3V 32*   LORI 6.6*   O2PER 2       Culture Data No results for input(s): CULT in the last 168 hours.    Virology Data:   Lab Results   Component Value Date    FLUAH1 Negative 11/22/2021    FLUAH3 Negative 11/22/2021    ES6842 Negative 11/22/2021    IFLUB Negative 11/22/2021    RSVA Negative 11/22/2021    RSVB Negative 11/22/2021    PIV1 Negative 11/22/2021    PIV2 Negative 11/22/2021    PIV3 Negative 11/22/2021    HMPV Negative 11/22/2021    HRVS Negative 11/22/2021    ADVBE  Negative 11/22/2021    ADVC Negative 11/22/2021    ADVC Negative 11/12/2021    ADVC Negative 10/17/2021       Historical CMV results (last 3 of prior testing):  Lab Results   Component Value Date    CMVQNT Not Detected 11/22/2021    CMVQNT Not Detected 11/16/2021    CMVQNT Not Detected 11/12/2021     No results found for: CMVLOG    Urine Studies    Recent Labs   Lab Test 11/01/21  1336 10/25/21  1507   URINEPH 5.5 5.5   NITRITE Negative Negative   LEUKEST Negative Negative   WBCU 0 2       Most Recent [facility] Pulmonary Function Testing (FVC/FEV1 only)  No results found for: 20002  No results found for: 20003  No results found for: 20015  No results found for: 20016    IMAGING    Recent Results (from the past 48 hour(s))   XR Chest 2 Views    Narrative    Exam: XR CHEST 2 VW, 12/3/2021 11:32 AM    Indication: interval follow up, h/o lung transplant, effusions    Comparison: Chest x-ray 12/1/2021    Findings:   PA and lateral views of the chest. Tracheostomy tube projects over the  midthoracic trachea. Stable left upper extremity PICC. Postsurgical  changes of chest with stable clamshell sternotomy wires and an  surgical clips.    Trachea is midline. Stable cardiomediastinal silhouette. No  appreciable pneumothorax. Stable blunting of bilateral costophrenic  angles. Streaky bibasilar opacities. Loculated left-sided pleural  effusion. No focal airspace opacity. Old right clavicular fracture. No  acute osseous abnormalities.      Impression    Impression:   1. Postsurgical changes of bilateral lung transplant.  2. Moderate left-sided loculated pleural effusion. Stable small  right-sided pleural effusion.  3. Bibasilar atelectasis.    I have personally reviewed the examination and initial interpretation  and I agree with the findings.    DAVIN ANGUIANO MD         SYSTEM ID:  [id number]   XR Chest 2 Views    Narrative    EXAM: XR CHEST 2 VW  12/4/2021 9:42 AM     HISTORY:  interval follow up, lung transplant        COMPARISON:  12/13/2021    FINDINGS:   Frontal and lateral chest the chest. Tracheostomy tube projects over  the mid thoracic trachea. Postsurgical changes of bilateral lung  transplantation. Clamshell sternotomy wires are intact. Left PICC tip  in the SVC. The trachea is midline. No appreciable pneumothorax.  Increased right pleural effusion and unchanged loculated left pleural  effusions with persistent bibasilar opacities. The visualized upper  abdomen is unremarkable. Partially visualized gastrostomy tube.  Chronic right clavicular deformity. No acute osseous abnormality.  Multilevel degenerative changes of the spine.      Impression    IMPRESSION:   1. Stable postoperative changes of bilateral lung transplantation. No  new acute airspace disease.  2. Increased right simple and unchanged loculated left pleural  effusion with associated basilar atelectasis.    I have personally reviewed the examination and initial interpretation  and I agree with the findings.    JODI MENDEZ MD         SYSTEM ID:  [id number]

## 2021-12-07 NOTE — PROGRESS NOTES
Neuro: A&Ox4.   Cardiac: SR/ST. VSS.  No fever   Respiratory: Sating 94-95% on RA.  GI/: Adequate urine output. BM X1  Diet/appetite: Tolerating Regular diet. Eating fair to poor.  Activity:  Assist of one  up to chair and in halls.  Pain: Denies pain.  Skin: Intact.  Arm  Pits slighty red and peeling .  No pain.   Incision intact.  Chest tube site scabbed over.     LDA's: Right PICC intact patent    Plan: Continue with POC. Notify primary team with changes.

## 2021-12-08 ENCOUNTER — APPOINTMENT (OUTPATIENT)
Dept: PHYSICAL THERAPY | Facility: CLINIC | Age: 56
End: 2021-12-08
Attending: STUDENT IN AN ORGANIZED HEALTH CARE EDUCATION/TRAINING PROGRAM
Payer: COMMERCIAL

## 2021-12-08 ENCOUNTER — APPOINTMENT (OUTPATIENT)
Dept: OCCUPATIONAL THERAPY | Facility: CLINIC | Age: 56
End: 2021-12-08
Attending: STUDENT IN AN ORGANIZED HEALTH CARE EDUCATION/TRAINING PROGRAM
Payer: COMMERCIAL

## 2021-12-08 LAB
ALBUMIN SERPL-MCNC: 2.3 G/DL (ref 3.4–5)
ALP SERPL-CCNC: 107 U/L (ref 40–150)
ALT SERPL W P-5'-P-CCNC: 25 U/L (ref 0–70)
ANION GAP SERPL CALCULATED.3IONS-SCNC: 6 MMOL/L (ref 3–14)
AST SERPL W P-5'-P-CCNC: 17 U/L (ref 0–45)
BILIRUB SERPL-MCNC: 0.2 MG/DL (ref 0.2–1.3)
BUN SERPL-MCNC: 35 MG/DL (ref 7–30)
CALCIUM SERPL-MCNC: 8.9 MG/DL (ref 8.5–10.1)
CHLORIDE BLD-SCNC: 106 MMOL/L (ref 94–109)
CO2 SERPL-SCNC: 27 MMOL/L (ref 20–32)
CREAT SERPL-MCNC: 0.7 MG/DL (ref 0.66–1.25)
ERYTHROCYTE [DISTWIDTH] IN BLOOD BY AUTOMATED COUNT: 14.5 % (ref 10–15)
GFR SERPL CREATININE-BSD FRML MDRD: >90 ML/MIN/1.73M2
GLUCOSE BLD-MCNC: 177 MG/DL (ref 70–99)
GLUCOSE BLDC GLUCOMTR-MCNC: 107 MG/DL (ref 70–99)
GLUCOSE BLDC GLUCOMTR-MCNC: 126 MG/DL (ref 70–99)
GLUCOSE BLDC GLUCOMTR-MCNC: 147 MG/DL (ref 70–99)
GLUCOSE BLDC GLUCOMTR-MCNC: 164 MG/DL (ref 70–99)
GLUCOSE BLDC GLUCOMTR-MCNC: 184 MG/DL (ref 70–99)
GLUCOSE BLDC GLUCOMTR-MCNC: 228 MG/DL (ref 70–99)
GLUCOSE BLDC GLUCOMTR-MCNC: 269 MG/DL (ref 70–99)
GLUCOSE BLDC GLUCOMTR-MCNC: 60 MG/DL (ref 70–99)
GLUCOSE BLDC GLUCOMTR-MCNC: 91 MG/DL (ref 70–99)
HCT VFR BLD AUTO: 27.8 % (ref 40–53)
HGB BLD-MCNC: 8.8 G/DL (ref 13.3–17.7)
INR PPP: 2.52 (ref 0.85–1.15)
MAGNESIUM SERPL-MCNC: 1.7 MG/DL (ref 1.6–2.3)
MCH RBC QN AUTO: 29.7 PG (ref 26.5–33)
MCHC RBC AUTO-ENTMCNC: 31.7 G/DL (ref 31.5–36.5)
MCV RBC AUTO: 94 FL (ref 78–100)
PHOSPHATE SERPL-MCNC: 2.9 MG/DL (ref 2.5–4.5)
PLATELET # BLD AUTO: 118 10E3/UL (ref 150–450)
POTASSIUM BLD-SCNC: 4.7 MMOL/L (ref 3.4–5.3)
PROT SERPL-MCNC: 5.7 G/DL (ref 6.8–8.8)
RBC # BLD AUTO: 2.96 10E6/UL (ref 4.4–5.9)
SODIUM SERPL-SCNC: 139 MMOL/L (ref 133–144)
TACROLIMUS BLD-MCNC: 10.6 UG/L (ref 5–15)
TME LAST DOSE: NORMAL H
TME LAST DOSE: NORMAL H
WBC # BLD AUTO: 8.5 10E3/UL (ref 4–11)

## 2021-12-08 PROCEDURE — 97116 GAIT TRAINING THERAPY: CPT | Mod: GP

## 2021-12-08 PROCEDURE — 250N000011 HC RX IP 250 OP 636: Performed by: STUDENT IN AN ORGANIZED HEALTH CARE EDUCATION/TRAINING PROGRAM

## 2021-12-08 PROCEDURE — 97535 SELF CARE MNGMENT TRAINING: CPT | Mod: GO

## 2021-12-08 PROCEDURE — 97110 THERAPEUTIC EXERCISES: CPT | Mod: GO

## 2021-12-08 PROCEDURE — 250N000012 HC RX MED GY IP 250 OP 636 PS 637: Performed by: INTERNAL MEDICINE

## 2021-12-08 PROCEDURE — 99233 SBSQ HOSP IP/OBS HIGH 50: CPT | Performed by: STUDENT IN AN ORGANIZED HEALTH CARE EDUCATION/TRAINING PROGRAM

## 2021-12-08 PROCEDURE — 250N000013 HC RX MED GY IP 250 OP 250 PS 637: Performed by: STUDENT IN AN ORGANIZED HEALTH CARE EDUCATION/TRAINING PROGRAM

## 2021-12-08 PROCEDURE — 84100 ASSAY OF PHOSPHORUS: CPT | Performed by: STUDENT IN AN ORGANIZED HEALTH CARE EDUCATION/TRAINING PROGRAM

## 2021-12-08 PROCEDURE — 250N000009 HC RX 250: Performed by: INTERNAL MEDICINE

## 2021-12-08 PROCEDURE — 999N000007 HC SITE CHECK

## 2021-12-08 PROCEDURE — 99233 SBSQ HOSP IP/OBS HIGH 50: CPT | Mod: 24 | Performed by: NURSE PRACTITIONER

## 2021-12-08 PROCEDURE — 250N000012 HC RX MED GY IP 250 OP 636 PS 637: Performed by: NURSE PRACTITIONER

## 2021-12-08 PROCEDURE — 83735 ASSAY OF MAGNESIUM: CPT | Performed by: STUDENT IN AN ORGANIZED HEALTH CARE EDUCATION/TRAINING PROGRAM

## 2021-12-08 PROCEDURE — 94640 AIRWAY INHALATION TREATMENT: CPT | Mod: 76

## 2021-12-08 PROCEDURE — 250N000012 HC RX MED GY IP 250 OP 636 PS 637: Performed by: STUDENT IN AN ORGANIZED HEALTH CARE EDUCATION/TRAINING PROGRAM

## 2021-12-08 PROCEDURE — 99223 1ST HOSP IP/OBS HIGH 75: CPT | Mod: 24 | Performed by: PHYSICAL MEDICINE & REHABILITATION

## 2021-12-08 PROCEDURE — 97530 THERAPEUTIC ACTIVITIES: CPT | Mod: GP

## 2021-12-08 PROCEDURE — 94668 MNPJ CHEST WALL SBSQ: CPT

## 2021-12-08 PROCEDURE — 94640 AIRWAY INHALATION TREATMENT: CPT

## 2021-12-08 PROCEDURE — 82040 ASSAY OF SERUM ALBUMIN: CPT | Performed by: STUDENT IN AN ORGANIZED HEALTH CARE EDUCATION/TRAINING PROGRAM

## 2021-12-08 PROCEDURE — 250N000013 HC RX MED GY IP 250 OP 250 PS 637: Performed by: ANESTHESIOLOGY

## 2021-12-08 PROCEDURE — 214N000001 HC R&B CCU UMMC

## 2021-12-08 PROCEDURE — 85027 COMPLETE CBC AUTOMATED: CPT | Performed by: STUDENT IN AN ORGANIZED HEALTH CARE EDUCATION/TRAINING PROGRAM

## 2021-12-08 PROCEDURE — 250N000009 HC RX 250: Performed by: NURSE PRACTITIONER

## 2021-12-08 PROCEDURE — 250N000013 HC RX MED GY IP 250 OP 250 PS 637: Performed by: NURSE PRACTITIONER

## 2021-12-08 PROCEDURE — 999N000157 HC STATISTIC RCP TIME EA 10 MIN

## 2021-12-08 PROCEDURE — 250N000012 HC RX MED GY IP 250 OP 636 PS 637: Performed by: PHYSICIAN ASSISTANT

## 2021-12-08 PROCEDURE — 250N000013 HC RX MED GY IP 250 OP 250 PS 637: Performed by: THORACIC SURGERY (CARDIOTHORACIC VASCULAR SURGERY)

## 2021-12-08 PROCEDURE — 80197 ASSAY OF TACROLIMUS: CPT | Performed by: INTERNAL MEDICINE

## 2021-12-08 PROCEDURE — 85610 PROTHROMBIN TIME: CPT | Performed by: STUDENT IN AN ORGANIZED HEALTH CARE EDUCATION/TRAINING PROGRAM

## 2021-12-08 RX ORDER — LEVALBUTEROL INHALATION SOLUTION 1.25 MG/3ML
1.25 SOLUTION RESPIRATORY (INHALATION) 3 TIMES DAILY
Status: DISCONTINUED | OUTPATIENT
Start: 2021-12-08 | End: 2021-12-13 | Stop reason: HOSPADM

## 2021-12-08 RX ORDER — MYCOPHENOLATE MOFETIL 250 MG/1
1000 CAPSULE ORAL 2 TIMES DAILY
Status: DISCONTINUED | OUTPATIENT
Start: 2021-12-08 | End: 2021-12-13 | Stop reason: HOSPADM

## 2021-12-08 RX ORDER — FLUTICASONE PROPIONATE 50 MCG
1 SPRAY, SUSPENSION (ML) NASAL DAILY
Status: DISCONTINUED | OUTPATIENT
Start: 2021-12-08 | End: 2021-12-13 | Stop reason: HOSPADM

## 2021-12-08 RX ORDER — ACETYLCYSTEINE 200 MG/ML
2 SOLUTION ORAL; RESPIRATORY (INHALATION) 3 TIMES DAILY
Status: DISCONTINUED | OUTPATIENT
Start: 2021-12-08 | End: 2021-12-13 | Stop reason: HOSPADM

## 2021-12-08 RX ORDER — MAGNESIUM OXIDE 400 MG/1
400 TABLET ORAL EVERY EVENING
Status: DISCONTINUED | OUTPATIENT
Start: 2021-12-08 | End: 2021-12-13 | Stop reason: HOSPADM

## 2021-12-08 RX ORDER — WARFARIN SODIUM 1 MG/1
1 TABLET ORAL
Status: COMPLETED | OUTPATIENT
Start: 2021-12-08 | End: 2021-12-08

## 2021-12-08 RX ORDER — MAGNESIUM OXIDE 400 MG/1
800 TABLET ORAL EVERY MORNING
Status: DISCONTINUED | OUTPATIENT
Start: 2021-12-09 | End: 2021-12-13 | Stop reason: HOSPADM

## 2021-12-08 RX ADMIN — CEFTAZIDIME 2 G: 2 INJECTION, POWDER, FOR SOLUTION INTRAVENOUS at 13:16

## 2021-12-08 RX ADMIN — FUROSEMIDE 40 MG: 40 TABLET ORAL at 08:58

## 2021-12-08 RX ADMIN — ESCITALOPRAM OXALATE 10 MG: 10 TABLET ORAL at 09:00

## 2021-12-08 RX ADMIN — QUETIAPINE FUMARATE 25 MG: 25 TABLET ORAL at 09:00

## 2021-12-08 RX ADMIN — INSULIN ASPART 2 UNITS: 100 INJECTION, SOLUTION INTRAVENOUS; SUBCUTANEOUS at 09:21

## 2021-12-08 RX ADMIN — ACETYLCYSTEINE 2 ML: 200 SOLUTION ORAL; RESPIRATORY (INHALATION) at 15:27

## 2021-12-08 RX ADMIN — LEVALBUTEROL HYDROCHLORIDE 1.25 MG: 1.25 SOLUTION RESPIRATORY (INHALATION) at 21:45

## 2021-12-08 RX ADMIN — CALCIUM CARBONATE 600 MG (1,500 MG)-VITAMIN D3 400 UNIT TABLET 1 TABLET: at 09:00

## 2021-12-08 RX ADMIN — PANTOPRAZOLE SODIUM 40 MG: 40 TABLET, DELAYED RELEASE ORAL at 16:43

## 2021-12-08 RX ADMIN — QUETIAPINE FUMARATE 25 MG: 25 TABLET ORAL at 13:16

## 2021-12-08 RX ADMIN — TACROLIMUS 2.5 MG: 1 CAPSULE ORAL at 08:58

## 2021-12-08 RX ADMIN — NYSTATIN 1000000 UNITS: 500000 SUSPENSION ORAL at 13:16

## 2021-12-08 RX ADMIN — AMLODIPINE BESYLATE 5 MG: 2.5 TABLET ORAL at 09:00

## 2021-12-08 RX ADMIN — ACETYLCYSTEINE 2 ML: 200 SOLUTION ORAL; RESPIRATORY (INHALATION) at 21:45

## 2021-12-08 RX ADMIN — LEVOTHYROXINE SODIUM 25 MCG: 0.03 TABLET ORAL at 09:00

## 2021-12-08 RX ADMIN — SODIUM CHLORIDE, PRESERVATIVE FREE 5 ML: 5 INJECTION INTRAVENOUS at 19:57

## 2021-12-08 RX ADMIN — NYSTATIN 1000000 UNITS: 500000 SUSPENSION ORAL at 16:43

## 2021-12-08 RX ADMIN — Medication 10 MG: at 19:57

## 2021-12-08 RX ADMIN — NYSTATIN 1000000 UNITS: 500000 SUSPENSION ORAL at 19:57

## 2021-12-08 RX ADMIN — TACROLIMUS 2.5 MG: 1 CAPSULE ORAL at 17:53

## 2021-12-08 RX ADMIN — PREDNISONE 10 MG: 10 TABLET ORAL at 19:58

## 2021-12-08 RX ADMIN — WARFARIN SODIUM 1 MG: 1 TABLET ORAL at 17:56

## 2021-12-08 RX ADMIN — INSULIN GLARGINE 30 UNITS: 100 INJECTION, SOLUTION SUBCUTANEOUS at 09:01

## 2021-12-08 RX ADMIN — PREDNISONE 10 MG: 10 TABLET ORAL at 09:00

## 2021-12-08 RX ADMIN — LEVALBUTEROL HYDROCHLORIDE 1.25 MG: 1.25 SOLUTION RESPIRATORY (INHALATION) at 15:27

## 2021-12-08 RX ADMIN — MYCOPHENOLATE MOFETIL 1000 MG: 250 CAPSULE ORAL at 08:58

## 2021-12-08 RX ADMIN — ACETAMINOPHEN 975 MG: 325 TABLET, FILM COATED ORAL at 13:16

## 2021-12-08 RX ADMIN — CYCLOBENZAPRINE HYDROCHLORIDE 10 MG: 5 TABLET, FILM COATED ORAL at 22:14

## 2021-12-08 RX ADMIN — MYCOPHENOLATE MOFETIL 1000 MG: 250 CAPSULE ORAL at 19:58

## 2021-12-08 RX ADMIN — QUETIAPINE FUMARATE 50 MG: 50 TABLET ORAL at 19:57

## 2021-12-08 RX ADMIN — INSULIN ASPART 1 UNITS: 100 INJECTION, SOLUTION INTRAVENOUS; SUBCUTANEOUS at 23:16

## 2021-12-08 RX ADMIN — Medication 1 PACKET: at 19:59

## 2021-12-08 RX ADMIN — ACETYLCYSTEINE 2 ML: 200 SOLUTION ORAL; RESPIRATORY (INHALATION) at 07:50

## 2021-12-08 RX ADMIN — NYSTATIN 1000000 UNITS: 500000 SUSPENSION ORAL at 08:57

## 2021-12-08 RX ADMIN — FLUCONAZOLE IN SODIUM CHLORIDE 400 MG: 2 INJECTION, SOLUTION INTRAVENOUS at 10:38

## 2021-12-08 RX ADMIN — Medication 1 PACKET: at 09:01

## 2021-12-08 RX ADMIN — INSULIN GLARGINE 30 UNITS: 100 INJECTION, SOLUTION SUBCUTANEOUS at 20:09

## 2021-12-08 RX ADMIN — INSULIN ASPART 4 UNITS: 100 INJECTION, SOLUTION INTRAVENOUS; SUBCUTANEOUS at 05:06

## 2021-12-08 RX ADMIN — LEVALBUTEROL HYDROCHLORIDE 1.25 MG: 1.25 SOLUTION RESPIRATORY (INHALATION) at 07:50

## 2021-12-08 RX ADMIN — CEFTAZIDIME 2 G: 2 INJECTION, POWDER, FOR SOLUTION INTRAVENOUS at 04:57

## 2021-12-08 RX ADMIN — CALCIUM CARBONATE 600 MG (1,500 MG)-VITAMIN D3 400 UNIT TABLET 1 TABLET: at 17:56

## 2021-12-08 RX ADMIN — CEFTAZIDIME 2 G: 2 INJECTION, POWDER, FOR SOLUTION INTRAVENOUS at 19:56

## 2021-12-08 RX ADMIN — SULFAMETHOXAZOLE AND TRIMETHOPRIM 1 TABLET: 400; 80 TABLET ORAL at 09:00

## 2021-12-08 RX ADMIN — ROSUVASTATIN CALCIUM 10 MG: 10 TABLET, FILM COATED ORAL at 09:04

## 2021-12-08 RX ADMIN — Medication 1 TABLET: at 09:00

## 2021-12-08 RX ADMIN — INSULIN ASPART 6 UNITS: 100 INJECTION, SOLUTION INTRAVENOUS; SUBCUTANEOUS at 01:16

## 2021-12-08 RX ADMIN — PANTOPRAZOLE SODIUM 40 MG: 40 TABLET, DELAYED RELEASE ORAL at 08:58

## 2021-12-08 RX ADMIN — Medication 400 MG: at 17:57

## 2021-12-08 RX ADMIN — ACETAMINOPHEN 975 MG: 325 TABLET, FILM COATED ORAL at 22:14

## 2021-12-08 ASSESSMENT — ACTIVITIES OF DAILY LIVING (ADL)
ADLS_ACUITY_SCORE: 17
ADLS_ACUITY_SCORE: 16
ADLS_ACUITY_SCORE: 16
ADLS_ACUITY_SCORE: 17
ADLS_ACUITY_SCORE: 17
ADLS_ACUITY_SCORE: 16
ADLS_ACUITY_SCORE: 16
ADLS_ACUITY_SCORE: 17
ADLS_ACUITY_SCORE: 16
ADLS_ACUITY_SCORE: 17
ADLS_ACUITY_SCORE: 16
ADLS_ACUITY_SCORE: 16
ADLS_ACUITY_SCORE: 17
ADLS_ACUITY_SCORE: 16
ADLS_ACUITY_SCORE: 17

## 2021-12-08 ASSESSMENT — MIFFLIN-ST. JEOR: SCORE: 1403.5

## 2021-12-08 NOTE — PROGRESS NOTES
Pulmonary Medicine  Cystic Fibrosis - Lung Transplant Team  Progress Note  2021       Patient: Edson Thornton  MRN: [id number]  : 1965 (age 56 year old)  Transplant: 10/16/2021 (Lung), POD#52)  Admission date: 2021    Assessment & Plan:     Edson Thornton is a 56 year old male with a PMH significant for NSIP/ILD, bronchiectasis, moderate PH, RA, SALTY, chronic HSV infection, hypogammaglobulinemia, steroid-induced diabetes, hypothyroidism, PFO, HTN, HLD, duodenal anomaly, anxiety, and depression.  Admitted on 21 from OSH for acute on chronic respiratory failure 2/2 ILD exacerbation, now s/p BSLT on 10/16/21.  Prolonged vent wean s/p trach and PEG/J tube placement with thoracic surgery 10/29.  Post-op course otherwise complicated by encephalopathy and diffuse weakness, acute to subacute CVA, afib with RVR, BRENNAN, GI bleed, Candidemia/Candida empyema, and anxiety.  Code called  for bradycardia/asystole, progressive hypotensive, found to have significant GI bleed, EGD with adherent clot near PEG tube site that was clipped.  Ongoing improvement in weakness and pain management.  Continuous TD since , weaned to RA , and tolerating continuous trach capping since .     Today's recommendations:  - Continue to cap trach. Will decannulate tomorrow.  - Agree with continuing diuresis.   - Tacrolimus level was slightly subtherapeutic at 7.7 on 21. Dose increased to 2.5mg BID.  Next level on  (ordered).   - Prednisone taper on  (not ordered).  - CMV and EBV ordered .  - Coccidioides Ag (blood) ordered .  - Sputum cultures () NGTD.  - ABX duration per transplant ID (will need repeat chest CT around time of transplant ID follow up )  - Continue fluconazole through  per transplant ID, EKG for QTc monitoring ordered   - IgG ordered 12/15  - DSA ordered   - Monitor ability to increase oral magnesium supplementation pending stools     S/p BSLT  for ILD:  Acute hypoxic respiratory failure, Resolved:   Prolonged vent weaning s/p trach:  Bilateral pleural effusions: Unfortunately had not received vaccination for flu, PNA, or COVID-19 PTA.  Explant pathology with NSIP, no malignancy.  PGD 2-3.  Weaned off paralytic 10/19 (for vent dyssynchrony) and Caroline 10/22.  Initial difficulty weaning sedation given agitation then with neurological findings as below.  Prolonged vent wean s/p trach and PEG/J tube placement with thoracic surgery 10/29.  Code called 11/2 for bradycardia/asystole (required 1 round of CPR, no medications) then progressively hypotensive, GI bleed as below.  Chest CT 11/2 with increased bilateral pleural effusions (moderate left, small right), bibasilar atelectasis with area of hypoenhancing parenchyma in LLL (suspicious for infection), and numerous nondisplaced anterior rib fractures bilaterally.  S/p left chest tube placement in IR 11/3 for pleural effusion/empyema (as below), right deferred given very little effusion on US.  S/p lytics 11/25-11/28 (CXR showing trapped left lung) and right thoracentesis 11/27.  Left chest tube removed 11/30.  Problems with trach cuff leak 11/3 (requiring multiple exchanges), bronch 11/14 with trach in good position with cuff well sealed in trachea and small to moderate secretions mostly in lower lobes.  Chest CT 11/22 with new cavitary lesion in the left lung base with multifocal GGO in bilateral upper lobes, new 1.8 cm fluid collection with surrounding fat stranding in the left axilla, decreased bilateral loculated pleural effusions, and similar small pericardial effusion.  Continuous TD since 11/20, weaned to RA 11/28, and tolerating continuous trach capping since 11/30 (last needed oxygen 12/1).    - Right pleural cultures (11/27) NGTD  - Nebs: levalbuterol and Mucomyst BID  - Pulmonary toilet with chest physiotherapy BID  - Continue capping trach as tolerated, supplemental oxygen as needed to maintain SpO2  >92%  - Will revisit trach decannulation in the next 1-2 days  - Volume management per primary team, diuresis today  - CXR today with moderate left-sided loculated pleural effusion, stable small right-sided pleural effusion, and bibasilar atelectasis (personally reviewed), repeat 12/7(ordered)  - Defer bronch, revisit PRN  - Pain management per primary team  - May need overnight oximetry study prior to discharge     Immunosuppression: s/p induction therapy with basiliximab 10/16 (and high dose IV steroid) and 10/20  - Tacrolimus 2.5 mg qAM / 2 mg qPM (increased 11/30, suspension, via G tube).  Goal level 8-12. Repeat level on 12/9.  - MMF 1000 mg BID (11/2, decreased given GI bleed, AZA to be avoided given TPMT)  - Prednisolone 10 mg qAM / 10 mg qPM, next taper due 1/2/22  Date AM dose (mg) PM dose (mg)   11/21/21 12.5 10   12/5/21 10 10   1/2/22 10 7.5   1/30/22 7.5 7.5   2/27/22 7.5 5   3/27/22 5 5   4/24/22 5 2.5      Prophylaxis:   - Bactrim for PJP ppx (held 11/2-11/5 d/t BRENNAN)  - Nystatin for oral candidiasis ppx, 6 month course  - See below for serologies and viral ppx:    Donor Recipient Intervention   CMV status Negative Negative None, CMV monthly (due 12/14, neg 11/16)   EBV status Positive Positive None, EBV monthly (due 12/14, neg 11/16)   HSV status N/A Positive S/p acyclovir POD #1-30 (recent infection history pre-txp)      ID: Concern for possible Strongyloides exposure pre-transplant s/p ivermectin x1 dose (9/17).  Donor and recipient cultures NGTD.  S/p IV ceftazidime/vancomycin for 48h per protocol and additional empiric ceftazidime 10/19-10/23 given recurrent fevers as below.  Cryptococcal Ag negative 10/29.  Reports some blood mixed in sputum 12/3.   - Monthly blood Coccidioides Ag x12 months post-transplant per ID (urine and serum negative 11/17), due 12/17 (ordered)  - Bacterial and fungal sputum cultures 12/5  - NGTD.     Recurring fevers, Resolved:  Fevers post-op, Tmax 101.7 POD #1.  Febrile  with worsening leukocytosis again 10/25, generally persisting.  LP (10/29), xanthochromic with pleocytosis thought to be appropriate given RBC and WBCs, no ABX recommended per transplant ID and neurology.  S/p empiric meropenem 10/28-11/2.       Klebsiella pneumoniae:  Pseudomonas fluorescens/putida:   LLL cavity: Klebsiella initially noted trach sputum culture 11/10.  Started on ceftriaxone 11/11, transitioned to ertapenem 11/12-11/13, and back to ceftriaxone 11/14.  Bronch culture 11/12 with Klebsiella and Pseudomonas fluorescens/putida (R-meropenem).  Chest CT 11/22 with LLL cavity as above, BAL with Klebsiella pneumoniae.  Histo Ag 11/22, 11/23 and Blast Ag 11/22 negative.  - ABX: ceftazidime (11/23-12/21) and levofloxacin (11/23-12/7) per transplant ID; s/p Zosyn (11/15-11/23)  - Transplant ID follow up scheduled 12/21 with repeat chest CT prior      Disseminated Candida: Noted on blood cultures 10/20 and 10/22.  BDG fungitell positive (399) on 10/20.  Respiratory cultures with persistent Candida albicans.  TC 10/23 without evidence of endocarditis.  Ophthalmology consult 10/24 with benign dilated fundoscopic exam.  Candida empyema also noted 10/25, chest tubes inadvertently removed by CVTS 10/28, left chest tube replaced by IR 11/3 as above with ongoing Candida on cultures (11/3, 11/18).  BDG fungitell positive (>500) and Aspergillus galactomannan negative 11/22.  - Fluconazole (10/26-12/21) per transplant ID, repeat EKG for QTc monitoring 12/7 (ordered); s/p micafungin 10/22-10/27     HSV: Chronic intermittent active infection pre-transplant with recent HSV infection: crusted lesions throughout left side of jaw, s/p 10 day treatment course of ACV through 10/9.  HSV PCR blood negative 10/17.  S/p ACV ppx as above (started POD #1 instead of POD #8 given HSV history and location).     Hypogammaglobulinemia: IgG previously low at 364 (9/7).  Noted at 265 at time of transplant, s/p IVIG 10/21, repeat  IgG (11/17) 198, s/p IVIG (with premedication) 11/18.  - Repeat IgG (12/15, ordered)     Positive cross match:   Positive DSA: Note that he received two doses of rituximab in June, which is likely contributing to cross match result.  DSA had been negative through 11/15 and newly positive 11/29 with DQB5 and mfi 2522.  - DSA monitoring weekly (12/6 - it is stable). Will repeat next Monday (12/13/21)     SALTY: Noted pre-transplant.  Home CPAP 6-12 cm H2O.  - Evaluate need for BiPAP after decannulation.     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  MRI 10/23 with multifocal subacute infarcts within both cerebral hemispheres and left cerebellum.  DDx include surgery v embolic v infectious.  Heparin drip started 10/23 (intermittently held with GI bleed as below).  Repeat stroke code 10/25 d/t marked decrease in responsiveness with sedation wean, pupil inequality, and absent gag reflex.  CTA head without obvious new pathology, MRI brain (with and without contrast) primarily revealing for infarct, low likelihood of PRES.  Ammonia normal.  VEEG per neuro with severe diffuse encephalopathy.  Ongoing improvement since 10/29, repeat head CT 11/2 (following code) without new acute intracranial abnormalities.  - AC management per primary team as below   - PT/OT      Afib with RVR: Noted 10/18, started on amiodarone drip and converted to SR, transitioned to PO 10/21, decreased 10/29.  Metoprolol and amiodarone discontinued 11/19 d/t intermittent bradycardia.  AC per primary team.      Right subclavian DVT: Seen on UE US from 11/4.  Heparin drip resumed 11/5, transitioned to warfarin 11/20 and in the setting of thrombocytopenia.  HIT negative  "11/21.      Recurrent GI bleed, Resolved: Hemoglobin dropped to 6.6 10/22, s/p 2 units pRBC.  OG tube with bloody output, EGD 10/23 noted NJ/OG tube trauma with scant oozing.  Progressive hypotension 11/2, hemoglobin 5.8 with bloody G tube output.  Heparin drip held, transitioned to PPI drip, and MTP activated.  EGD 11/2 with large amount of clotted blood in stomach and area of raised mucosa with small adherent clot near PEG tube site that was clipped, no active bleeding.  Maroon stools 11/3, repeat EGD with extensive old blood in stomach, no active bleeding, small nodular area with prior clips clipped again.  Most recent EGD 11/5 with ulcer noted at PEG tube bumper site, gastritis, and suction marks from G tube. TF noted in G tube drainage bag 11/7, AXR with GJ tube tip projecting over proximal duodenum. GJ tube exchanged 11/9 by GI.      Elevated LFTs, Resolved: Shock liver post-op, now resolved.  Intermittent alk phos elevation, recently normal.     Hypomagnesemia: Suppressed reabsorption 2/2 CNI.  Requiring intermittent IV and PO replacement.  - Mag-Ox 400 mg BID, monitor ability to increase pending stools    Mild Thrombocytopenia: Improving but not back to baseline. it is stable in 110's.     We appreciate the excellent care provided by the [place] [id number] team.  Recommendations communicated via in person rounding and this note.  Will continue to follow along closely, please do not hesitate to call with any questions or concerns.       Subjective & Interval History:     Breathing is stable. Coughed up sputum occ.vStill feels dyspneic with exertion.  Still does not have much of an appetite.      Review of Systems:     Please see HPI, otherwise the complete 10 point ROS is negative.     Physical Exam:     Vital signs:  Temp: 98.5  F (36.9  C) Temp src: Oral BP: 131/85 Pulse: 102   Resp: 18 SpO2: 95 % O2 Device: None (Room air) Oxygen Delivery: 2 LPM Height: 162.6 cm (5' 4.02\") Weight: 66.2 kg (145 lb 15.1 " oz)  I/O:     Intake/Output Summary (Last 24 hours) at 12/7/2021 2346  Last data filed at 12/7/2021 2300  Gross per 24 hour   Intake 1540 ml   Output 1650 ml   Net -110 ml     GENERAL: alert, NAD  HEENT: NCAT, EOMI, no scleral icterus, OMM.   Neck: Bivona #6 TTS in place, cuff down and capped.  Lungs: good air flow, no crackles, rhonchi or wheezing  CV: RRR, S1S2, no murmurs noted  Abdomen: normoactive BS, soft, non tender  Neuro: AAO X 3  Psychiatric: flat affect, good eye contact  Skin: no rash, jaundice or lesions on limited exam  Extremities: No clubbing, cyanosis or edema.      Lines, Drains, and Devices:  PICC Triple Lumen 11/04/21 Left Basilic Access. PICC okay to use. (Active)   Site Assessment WDL 12/05/21 0900   External Cath Length (cm) 1 cm 11/30/21 1600   Extremity Circumference (cm) 28 cm 11/28/21 1038   Dressing Intervention Chlorhexidine patch;Transparent 12/04/21 2000   Dressing Change Due 12/05/21 12/04/21 0945   PICC Comment CDI 12/05/21 0900   Mosley - Status heparin locked 12/05/21 0900   [place]ley - Cap Change Due 12/07/21 12/04/21 0945   Red - Status infusing 12/05/21 0900   Red - Cap Change Due 12/07/21 12/04/21 0945   White - Status heparin locked 12/05/21 0900   White - Cap Change Due 12/07/21 12/04/21 0945   Extravasation? No 12/04/21 0945   Line Necessity Yes, meets criteria 12/05/21 0900   Number of days: 31     Data:     LABS    CMP:   Recent Labs   Lab 12/07/21  1937 12/07/21  1620 12/07/21  0833 12/07/21  0605 12/06/21  0839 12/06/21  0554 12/05/21  0739 12/05/21  0637 12/04/21  0959 12/04/21  0607   NA  --   --   --  139  --  141  --  138  --  140   POTASSIUM  --   --   --  4.8  --  4.1  --  4.5  --  4.4   CHLORIDE  --   --   --  107  --  106  --  105  --  106   CO2  --   --   --  25  --  26  --  27  --  27   ANIONGAP  --   --   --  7  --  9  --  6  --  7   * 112* 133* 174*   < > 199*   < > 197*   < > 121*   BUN  --   --   --  31*  --  23  --  23  --  24   CR  --   --   --  0.68  --   0.65*  --  0.66  --  0.69   GFRESTIMATED  --   --   --  >90  --  >90  --  >90  --  >90   ERIK  --   --   --  8.8  --  9.1  --  9.1  --  9.4   MAG  --   --   --  1.9  --  1.4*  --  1.7  --  1.8   PHOS  --   --   --  3.4  --  3.1  --  3.2  --  3.0   PROTTOTAL  --   --   --  5.8*  --  6.1*  --  5.9*  --  6.2*   ALBUMIN  --   --   --  2.3*  --  2.4*  --  2.3*  --  2.4*   BILITOTAL  --   --   --  <0.1*  --  0.2  --  0.2  --  0.2   ALKPHOS  --   --   --  106  --  114  --  109  --  113   AST  --   --   --  22  --  26  --  21  --  23   ALT  --   --   --  27  --  32  --  27  --  27    < > = values in this interval not displayed.     CBC:   Recent Labs   Lab 12/07/21  0605 12/06/21  0554 12/05/21 0637 12/04/21  0607   WBC 8.7 8.2 9.5 11.0   RBC 3.01* 3.19* 2.93* 3.13*   HGB 9.0* 9.6* 9.0* 9.6*   HCT 29.0* 30.4* 28.7* 30.6*   MCV 96 95 98 98   MCH 29.9 30.1 30.7 30.7   MCHC 31.0* 31.6 31.4* 31.4*   RDW 14.6 14.6 14.6 15.0   * 113* 115* 118*       INR:   Recent Labs   Lab 12/07/21  0605 12/06/21  0554 12/05/21  0637 12/04/21  0607   INR 2.51* 2.35* 2.35* 2.13*       Glucose:   Recent Labs   Lab 12/07/21  1937 12/07/21  1620 12/07/21  0833 12/07/21  0605 12/07/21  0409 12/07/21  0038   * 112* 133* 174* 183* 163*       Blood Gas:   Recent Labs   Lab 12/01/21  0725   PHV 7.42   PCO2V 50   PO2V 43   HCO3V 32*   LORI 6.6*   O2PER 2       Culture Data No results for input(s): CULT in the last 168 hours.    Virology Data:   Lab Results   Component Value Date    FLUAH1 Negative 11/22/2021    FLUAH3 Negative 11/22/2021    HU2455 Negative 11/22/2021    IFLUB Negative 11/22/2021    RSVA Negative 11/22/2021    RSVB Negative 11/22/2021    PIV1 Negative 11/22/2021    PIV2 Negative 11/22/2021    PIV3 Negative 11/22/2021    HMPV Negative 11/22/2021    HRVS Negative 11/22/2021    ADVBE Negative 11/22/2021    ADVC Negative 11/22/2021    ADVC Negative 11/12/2021    ADVC Negative 10/17/2021       Historical CMV results (last 3 of  prior testing):  Lab Results   Component Value Date    CMVQNT Not Detected 11/22/2021    CMVQNT Not Detected 11/16/2021    CMVQNT Not Detected 11/12/2021     No results found for: CMVLOG    Urine Studies    Recent Labs   Lab Test 11/01/21  1336 10/25/21  1507   URINEPH 5.5 5.5   NITRITE Negative Negative   LEUKEST Negative Negative   WBCU 0 2       Most Recent [facility] Pulmonary Function Testing (FVC/FEV1 only)  No results found for: 20002  No results found for: 20003  No results found for: 20015  No results found for: 20016    IMAGING    Recent Results (from the past 48 hour(s))   XR Chest 2 Views    Narrative    Exam: XR CHEST 2 VW, 12/3/2021 11:32 AM    Indication: interval follow up, h/o lung transplant, effusions    Comparison: Chest x-ray 12/1/2021    Findings:   PA and lateral views of the chest. Tracheostomy tube projects over the  midthoracic trachea. Stable left upper extremity PICC. Postsurgical  changes of chest with stable clamshell sternotomy wires and an  surgical clips.    Trachea is midline. Stable cardiomediastinal silhouette. No  appreciable pneumothorax. Stable blunting of bilateral costophrenic  angles. Streaky bibasilar opacities. Loculated left-sided pleural  effusion. No focal airspace opacity. Old right clavicular fracture. No  acute osseous abnormalities.      Impression    Impression:   1. Postsurgical changes of bilateral lung transplant.  2. Moderate left-sided loculated pleural effusion. Stable small  right-sided pleural effusion.  3. Bibasilar atelectasis.    I have personally reviewed the examination and initial interpretation  and I agree with the findings.    DAVIN ANGUIANO MD         SYSTEM ID:  [id number]   XR Chest 2 Views    Narrative    EXAM: XR CHEST 2 VW  12/4/2021 9:42 AM     HISTORY:  interval follow up, lung transplant       COMPARISON:  12/13/2021    FINDINGS:   Frontal and lateral chest the chest. Tracheostomy tube projects over  the mid thoracic trachea. Postsurgical  changes of bilateral lung  transplantation. Clamshell sternotomy wires are intact. Left PICC tip  in the SVC. The trachea is midline. No appreciable pneumothorax.  Increased right pleural effusion and unchanged loculated left pleural  effusions with persistent bibasilar opacities. The visualized upper  abdomen is unremarkable. Partially visualized gastrostomy tube.  Chronic right clavicular deformity. No acute osseous abnormality.  Multilevel degenerative changes of the spine.      Impression    IMPRESSION:   1. Stable postoperative changes of bilateral lung transplantation. No  new acute airspace disease.  2. Increased right simple and unchanged loculated left pleural  effusion with associated basilar atelectasis.    I have personally reviewed the examination and initial interpretation  and I agree with the findings.    JODI MENDEZ MD         SYSTEM ID:  [id number]

## 2021-12-08 NOTE — PROGRESS NOTES
"Neuro: A&Ox4.  Reports \"no blurred vision\" but more of a \"blind spot\".   No head ache or numbness or tingling of upper or lower extremites.   Reported not \"getting any sleep during the night\".  Primary attending ordered 1;  No vital  Signs or Tube feeding flushes during the night.  Weight  Later in the morning.   Patient informed of change in cares.  Cardiac: SR. VSS. No fever.   Respiratory: Sating 96% on RA.  MYERS.  No acute distress.  Productive cough of thick blood tinged sputum.  No report of shortness of breath at rest.    GI/: Adequate urine output. BM X2.  Loosely formed stool.   Diet/appetite: Tolerating regular  diet. Eating fair.  Declined to order breakfast.  Glucose this A.M 189 and prior to  Lunch 60.  At which time patient was eating lunch.  Glucose monitored q 15 minutes.  FSG  after eating.    Activity:  Up to bedside commode. Declined to sit up in chair due to feeling \"tired\" and a lack of sleep.  Pain: Denies pain.  Skin: No new deficits noted.  LDA's:WNL's.  Flushes well.    Plan: Continue with POC. Notify primary team with changes.    "

## 2021-12-08 NOTE — PROGRESS NOTES
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 Post-op course complicated encephalopathy, and diffuse weakness, acute to subacute by CVA, afib with RVR, BRENNAN, candidemia/candida empyema, and Code Blue due to bradycardia/asystole and hypotension for significant GIB s/p EGD with adherent clot near PEG tube site s/p clips. Transferred from ICU to medicine on 11/23/2021. Weaned off ventilator and chest tubes now removed. Appears ready for discharge to ARU.      ILD and NSIP s/p BSLT on 10/16/2021  Acute hypoxic respiratory failure s/p tracheostomy on 10/29  Bilateral pleural effusions   - Transplant pulmonology following, appreciate recs   - IS: s/p basiliximab on 10/16 and 10/20. Continue tacrolimus 2 mg QAM and 2.5 mg QPM (goal 8-12), MMF 1000 mg BID, and prednisone 12.5 mg QAM and 10 mg QPM  - Ppx: Continue bactrim 400-80 mg daily, nystatin QID x6 months  - Monthly Coccidioides no longer suggested by ID  - DSA every 2 weeks   - Levalbuterol and mucomyst QID and pulm toilet and chest PT QID  - PT/OT/SLP   - TD with cuff down and PMSV during the day, capping trial as long as tolerated  - Restarted oral diuresis at 40 mg lasix daily   - Oxycodone 5-10 mg Q4H  - Schedule tylenol     L lung cavitary lesion   suspect aspiration vs pseudomonal vs K pneumonia induced abscess. CT on 10/25 with LLL nodular opacity. Repeat CT chest on 11/22 with new cavitary lesion in the left lung base, and with multifocal bilateral upper lobe GGO  (some of which are new), new 1.8 cm fluid collection with surrounding fat stranding in the left axilla, decreased bilateral loculated pleural effusions. Indeterminate Quant Gold, but negative tuberculin skin tests on mulitple occurences. S/p BAL on 11/22. ID feels less likely fungal. B D glucan positive but has known candidemia. Cocci ag in urine negatie, serum histo negative, CRAG negative  - Transplant ID following   - ID recommends stopping zosyn (11/15-11/23), start Ceftazdime 2 grams Q8H (until 12/21) and levaquin 750 mg daily (until 12/07)  - Follow up CT chest in 4 weeks 12/21  - Please include in the patient's discharge instructions, follow up with Dr. Abebe on 12/21/2021 @6:30 PM in a virtual visit.   - No indicated labs outside of post-transplant labs.     Klebsiella pneumoniae and Pseudomonas fluorescens/putida HAP  Klebsiella initially noted trach sputum culture 11/10. Bronch culture 11/12 with Klebsiella and Pseudomonas fluorescens/putida (R-meropenem).     - Abx as above.       Disseminated Candida   + candida BCx 10/20 and 10/22.  BDG fungitell positive (399) on 10/20. Sputum Cx + Candida albicans persistently.  TC 10/23 without evidence of endocarditis. Ophthalmology consult 10/24 with benign dilated fundoscopic exam.  Candida empyema also noted 10/25, chest tubes inadvertently removed by CVTS 10/28, left chest tube replaced by IR 11/3 as above with ongoing Candida on cultures. Repeat pleural fungal cultures and fungal/bacterial blood cultures on 11/18 NGTD. Transplant as noted above following   - L chest tube removed 12/1  - Initially on Micafungin (10/22-10/27) transition to fluconazole 400 mg IV daily (start date 10/26 end date 12/21)  - ID recommends evacuate any remaining R and/or left loculated effusions      Hypogammaglobinemia  s/p IVIG with plan to repeat IgG on 12/15     Encephalopathy, resolved   likely multi-factorial. Multiple brain imaging with stroke as noted below, but negative for  PRESS. Ammonia negative. vEEG with severe diffuse encephalopathy. LP as noted below negative for infection. Neurology previously following.   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 MRI brain on 10/23 with multifocal subacute infarct within both cerebral hemispheres and left cerebellum. Presumed embolic with afib hx.   - Anticoagulation as noted below      Afib with hx of RVR - [id number] 4 for HTN, CVA, and DM2. First noted on 10/18, started on amiodarone drip, and converted to NSR. Metoprolol and amidoarone discontinued on 11/20 d/t intermittent bradycardia.   - Anticoagulation with warfarin as noted below  - Continue Rosuvastatin 10 mg daily      R subclavian DVT   dx on 11/4. Resumed on heparin drip on 11/5 and transitioned to warfarin in setting of thrombocytopenia. HIT panel negative on 11/21.   - Continue warfarin per pharmacy protocol     DM2 with steroid induced hyperglycemia   Hemoglobin A1c 6.6 pre-operatively. Endocrine recently consulted for steroids induced hyperglycemia.   - Continue Lantus 30 mg BID  - Novolog High Sliding scale insulin Q4H  - BG Q4H      Hypomagnesemia   Likely 2/2 suppressed reabsorption from CNI.   - Sliding scale replacement protocol       Diarrhea   C. Diff negative on 11/20. Rectal tube in place on 11/22. Possibly due to tube feeds or magnesium supplementation.   - Monitor I&O      Malnutrition   S/p PEGJ  - MVI, folic acid, B6, B12 supplements   - TF per nutrition      Anxiety and depression   Psychiatry reconsulted and after discussion suggest increasing lexapro. Will discuss with patient regarding starting ritalin in the AM for motivation.  - Lexapro increased to 10 mg daily could increase to 20 in a week ~12/12    Concern for chipped tooth  Poor dental hygiene  Patient noted he feels like he may  have chipped his tooth or has a loose tooth after eating guevara. It is not painful.   - Dental hygiene consult placed and chipped tooth stable rec oral hygiene cares     ------ Chronic stable medical problems ------     RA- Dx 5/2021 with + CCP ab and RF. Previously treated with rituximab. Rheum consulted early in admission. On steroids as noted above.   SALTY - Uses CPAP at bedtime prior to admission. Will need to evaluate for BiPAP after decannulation   HTN- Continue PTA amlodipine 5 mg daily. PRN labetalol and hydralazine for goal SBP <140   Hypothyroidism - TSH 3.17 on 11/19/21. Continue PTA levothyroxine 25 mcg daily      ------ Resolved Hospital problems -------     Recurrent GIB - hgb drop on 10/22 s/p 2 unit pRBC transfusion with EGD on 10/23 with NJ/OG tube trauma with scant oozing. Patient then developed progressive hypotension and ultimately CODE BLUE for GIB in setting of anticoagulation with heparin drip, s/p MTP. EGD on 11/2 with large amount of clotted blood in stomach and area of raised mucosa with small adherent clot near PEG tube site that was clipped, no active bleeding.  Maroon stools 11/3, repeat EGD with extensive old blood in stomach, no active bleeding, small nodular area with prior clips clipped again.  Most recent EGD 11/5 with ulcer noted at PEG tube bumper site, gastritis, and suction marks from G tube. TF noted in G tube drainage bag 11/7, AXR with GJ tube tip projecting over proximal duodenum. GJ tube exchanged 11/9 by GI.  - PO PPI BID      Transaminitis - elevated LFTs post-op, thought to be due to shock liver      BRENNAN - post operative BRENNAN, Cr peaked at 2.05, with renal function back at baseline with Cr at 0.7     Recurring fevers - Fevers post-op, Tmax 101.7 POD #1.  Febrile with worsening leukocytosis again 10/25, generally persisting.  LP (10/29), xanthochromic with pleocytosis thought to be appropriate given RBC and WBCs, no ABX recommended per transplant ID and neurology.  S/p  empiric meropenem 10/28-11/2.  Now afebrile, ABX as above.     HSV-  Chronic intermittent active infection pre-transplant with recent HSV infection: crusted lesions throughout left side of jaw, s/p 10 day treatment course of ACV through 10/9.  HSV PCR blood negative 10/17.  S/p ACV ppx as above (started POD #1 instead of POD #8 given HSV history and location).     Hypernatremia, resolved  Due to inability to have oral intake. Resolved now that he is PO and able to access water.       Diet: Regular Diet Adult  Adult Formula Drip Feeding: Continuous Vital 1.5; Jejunostomy; Goal Rate: 80; mL/hr; From: 4:00 PM; 8:00 AM; Medication - Feeding Tube Flush Frequency: See free water flush order. At least 15-30 mL water before and after medication administration ...  Calorie Counts  Snacks/Supplements Adult: Ensure Clear; Between Meals    DVT Prophylaxis: Heparin SQ  Watson Catheter: Not present  Central Lines: None  Code Status: Full Code      Disposition Plan   Expected discharge: medically ready to discharge to TCU recommended to transitional care unit once safe disposition plan/ TCU bed available.     The patient's care was discussed with the Patient and Pulm Consultant.    Gume Baldwin MD  Hospitalist Service  [facility]  Securely message with the Vocera Web Console (learn more here)  Text page via [facility] Paging/Directory    Please see sign in/sign out for up to date coverage information    Clinically Significant Risk Factors Present on Admission                ______________________________________________________________________    Interval History   Patient seen and examined. No acute events overnight. Feeling ok. Denies pain. Appetite poor. Tired today. Breathing is stable.    Four point ROS completed and negative unless listed above    Data reviewed today: I reviewed all medications, new labs and imaging results over the last 24 hours.    Physical Exam   Vital Signs: Temp:  98.5  F (36.9  C) Temp src: Oral BP: 131/85 Pulse: 102   Resp: 18 SpO2: 95 % O2 Device: None (Room air)    Weight: 145 lbs 15.11 oz  General Appearance: NAD  Respiratory: CTA b/l  Cardiovascular: RRR S1/S2, no m,r,g  GI: soft, NT, ND, +BS  Skin: no rashes  Other: No edema     Data   Recent Labs   Lab 12/07/21  1937 12/07/21  1620 12/07/21  0833 12/07/21  0605 12/06/21  0839 12/06/21  0554 12/05/21  0739 12/05/21  0637   WBC  --   --   --  8.7  --  8.2  --  9.5   HGB  --   --   --  9.0*  --  9.6*  --  9.0*   MCV  --   --   --  96  --  95  --  98   PLT  --   --   --  128*  --  113*  --  115*   INR  --   --   --  2.51*  --  2.35*  --  2.35*   NA  --   --   --  139  --  141  --  138   POTASSIUM  --   --   --  4.8  --  4.1  --  4.5   CHLORIDE  --   --   --  107  --  106  --  105   CO2  --   --   --  25  --  26  --  27   BUN  --   --   --  31*  --  23  --  23   CR  --   --   --  0.68  --  0.65*  --  0.66   ANIONGAP  --   --   --  7  --  9  --  6   ERIK  --   --   --  8.8  --  9.1  --  9.1   * 112* 133* 174*   < > 199*   < > 197*   ALBUMIN  --   --   --  2.3*  --  2.4*  --  2.3*   PROTTOTAL  --   --   --  5.8*  --  6.1*  --  5.9*   BILITOTAL  --   --   --  <0.1*  --  0.2  --  0.2   ALKPHOS  --   --   --  106  --  114  --  109   ALT  --   --   --  27  --  32  --  27   AST  --   --   --  22  --  26  --  21    < > = values in this interval not displayed.     Recent Results (from the past 24 hour(s))   XR Chest Port 1 View    Narrative    EXAM: XR CHEST PORT 1 VIEW  12/7/2021 8:13 AM     HISTORY:  Lung tx. Candida empyema.    COMPARISON:  Chest x-ray 12/4/2021 and CT chest 11/22/2021    TECHNIQUE: Portable AP semiupright view of the chest.    FINDINGS:   Patient is rotated.  Tracheostomy tube in stable position. Left PICC line tip is in the mid  SVC.. Stable postsurgical appearance of bilateral lung transplant with  stable sternotomy wires and surgical clips.    Trachea is midline. Stable cardiomediastinal silhouette.  No  appreciable pneumothorax. Stable right pleural effusion and loculated  left pleural effusion. Stable bibasilar opacities. Right lower lobe  opacity. Cavitary left lower lung lesion. Chronic right clavicular  deformity. No acute osseous abnormality.      Impression    IMPRESSION:   1. Stable postoperative changes in the chest status post bilateral  lung transplantation.  2. Stable loculated left pleural effusion with overlying atelectasis  and right pleural effusion with right lower lobe opacity corresponding  to the loculated effusion seen on CT 11/22/2021.  3. Stable appearance of left lower lobe cavitary nodule.    I have personally reviewed the examination and initial interpretation  and I agree with the findings.    DAVIN ANGUIANO MD         SYSTEM ID:  [id number]     Medications     dextrose Stopped (10/24/21 1008)     Warfarin Therapy Reminder         acetaminophen  975 mg Oral or Feeding Tube Q8H     acetylcysteine  2 mL Nebulization BID     amLODIPine  5 mg Oral Daily     calcium carbonate 600 mg-vitamin D 400 units  1 tablet Oral or Feeding Tube BID w/meals     cefTAZidime  2 g Intravenous Q8H     cyclobenzaprine  10 mg Oral At Bedtime     escitalopram  10 mg Oral or Feeding Tube Daily     fiber modular (NUTRISOURCE FIBER)  1 packet Per Feeding Tube BID     fluconazole  400 mg Intravenous Q24H     folic acid-vit B6-vit B12  1 tablet Oral Daily     furosemide  40 mg Oral Daily     heparin lock flush  5-20 mL Intracatheter Q24H     insulin aspart  1-12 Units Subcutaneous Q4H     insulin glargine  30 Units Subcutaneous BID     levalbuterol  1.25 mg Nebulization BID     levothyroxine  25 mcg Oral Daily     lidocaine  2 patch Transdermal Q24H     lidocaine   Transdermal Q8H     magnesium oxide  400 mg Oral BID     melatonin  10 mg Oral QPM     menthol   Transdermal Q8H     multivitamin w/minerals  1 tablet Oral Daily     mycophenolate  1,000 mg Oral BID IS     nystatin  1,000,000 Units Swish & Swallow 4x Daily      pantoprazole  40 mg Oral BID AC     predniSONE  10 mg Oral or Feeding Tube BID     QUEtiapine  25 mg Oral or Feeding Tube BID     QUEtiapine  50 mg Oral At Bedtime     rosuvastatin  10 mg Oral or Feeding Tube Daily     sodium chloride (PF)  10-40 mL Intracatheter Q8H     sodium chloride (PF)  3 mL Intracatheter Q8H     sulfamethoxazole-trimethoprim  1 tablet Oral or Feeding Tube Daily     tacrolimus  2.5 mg Oral QAM     tacrolimus  2.5 mg Oral QPM

## 2021-12-08 NOTE — PLAN OF CARE
Code Status: Full  Team: Gold 11    D: .Admitted with ILD complications, now s/p BSLT 10/16 c/b CVA, encephalopathy, acute resp failure, afib RVR, BRENNAN, candida empyema, pleural effusion s/p CT, PEA arrest d/t GIB 11/12. Hx NSIP, RA, bronchiectasis, pHTN, SALTY, HTN, HLD, PLD, hypogammaglobinemia, duodenal anomaly, anxiety, depression    I: Monitored Vitals and assessed patients status. Advised to increase protein intake. Reassured patient that anxiety related to activity is not uncommon with post op lung. Encouraged to increase OOB.       Running: tube feeds 80ml/hr in J port (4p-8am).       A: A&Ox4, VSS on RA, bivina trache removed by physician. Occlusive dressing observed over trache site.  SR/ST. Known vision change due to CVA. Please limit disturbances overnight. No vital signs overnight ok. Please check blood sugars. Cluster cares. Patient is tachycardic, tachypneic when ambulating . Patient uses IS frequently. Pleasant and cooperative with cares. Able to make needs known. Up in chair until 8pm tonight. Ate 25% of dinner.     P: Continue to monitor pt status and report changes to treatment team. Per rehab MD: patient ok to transition to ARU.

## 2021-12-08 NOTE — PROGRESS NOTES
Pulmonary Medicine  Cystic Fibrosis - Lung Transplant Team  Progress Note  2021       Patient: Edson Thornton  MRN: [id number]  : 1965 (age 56 year old)  Transplant: 10/16/2021 (Lung), POD#53  Admission date: 2021    Assessment & Plan:     Edson Thornton is a 56 year old male with a PMH significant for NSIP/ILD, bronchiectasis, moderate PH, RA, SALTY, chronic HSV infection, hypogammaglobulinemia, steroid-induced diabetes, hypothyroidism, PFO, HTN, HLD, duodenal anomaly, anxiety, and depression.  Admitted on 21 from OSH for acute on chronic respiratory failure 2/2 ILD exacerbation, now s/p BSLT on 10/16/21.  Prolonged vent wean s/p trach and PEG/J tube placement with thoracic surgery 10/29.  Post-op course also complicated by encephalopathy and diffuse weakness, acute to subacute CVA, afib with RVR, BRENNAN, GI bleed, Candidemia/Candida empyema, and anxiety.  Code called  for bradycardia/asystole, progressive hypotensive, found to have significant GI bleed, EGD with adherent clot near PEG tube site that was clipped.  Ongoing improvement in weakness, pain, and respiratory status.       Today's recommendations:  - Nebs increased from BID to TID, continue BID CPT for now  - Trach decannulated today  - [place]ro level today therapeutic, repeat level  (ordered)  - Next prednisone taper due   - CMV and EBV ordered   - Repeat DSA ordered   - Following recent bacterial and fungal sputum cultures  - Monthly blood Coccidioides Ag due  (ordered)  - Continue ceftaz and fluconazole courses through   - Repeat IgG ordered 12/15  - Evaluate need for BiPAP after trach site has healed  - Mag oxide dose increased     S/p BSLT for ILD:  Acute hypoxic respiratory failure, Resolved:   Prolonged vent weaning s/p trach, Resolved:  Bilateral pleural effusions: Explant pathology with NSIP, no malignancy.  Prolonged vent wean s/p trach and PEG/J tube placement with thoracic surgery 10/29.   Code called 11/2 for bradycardia/asystole (required 1 round of CPR, no medications) then progressively hypotensive, GI bleed as below.  S/p left chest tube placement (11/3-11/30) for pleural effusion/empyem s/p lytics 11/25-11/28 (CXR showing trapped left lung), right thoracentesis 11/27 (cultures negative).  Chest CT (11/22) with new LLL cavitary lesion with multifocal GGO in BUL, new 1.8 cm fluid collection with surrounding fat stranding in the left axilla, decreased bilateral loculated pleural effusions, and similar small pericardial effusion.  Hypoxia resolved 12/1 and trach has remained capped exclusively since 11/30, tolerating well.  CXR (12/7) with stable effusions and RLL opacity.  - Nebs: levalbuterol and Mucomyst increased from BID to TID d/t chest tightness and dyspnea with tenacious secretions  - Pulmonary toilet with chest physiotherapy BID, continue for now  - Trach decannulated today  - Volume management per primary team  - Repeat CXR 12/10 (ordered)     Immunosuppression: s/p induction therapy with basiliximab 10/16 (and high dose IV steroid) and 10/20  - Tacrolimus 2.5 mg qBID (increased 12/6).  Goal level 8-12. Level today therapeutic, repeat level 12/11 (ordered).  - MMF 1000 mg BID (decreased 11/2 given GI bleed, AZA to be avoided given TPMT)  - Prednisolone 10 mg BID, next taper due 1/2/22 (not yet ordered)  Date AM dose (mg) PM dose (mg)   12/5/21 10 10   1/2/22 10 7.5   1/30/22 7.5 7.5   2/27/22 7.5 5   3/27/22 5 5   4/24/22 5 2.5      Prophylaxis:   - Bactrim for PJP ppx (held 11/2-11/5 d/t BRENNAN)  - Nystatin for oral candidiasis ppx, 6 month course  - See below for serologies and viral ppx:    Donor Recipient Intervention   CMV status Negative Negative None, CMV monthly (ordered 12/14)   EBV status Positive Positive None, EBV monthly (ordered 12/14)   HSV status N/A Positive S/p acyclovir POD #1-30 (recent infection history pre-txp)     Positive cross match:   Positive DSA: Note that he  received two doses of rituximab in June, which is likely contributing to cross match result.  DSA newly positive 11/29 with DQB5 (mfi 2522), decreased with f/u on 12/6  - Repeat DSA ordered 12/13    ID: Concern for possible Strongyloides exposure pre-transplant s/p ivermectin x1 dose (9/17).  Donor and recipient cultures NGTD.  S/p IV ceftazidime/vancomycin for 48h per protocol and additional empiric ceftazidime 10/19-10/23 given recurrent fevers as below.  Cryptococcal Ag negative 10/29.  Reports some blood mixed in sputum 12/3, persisting intermittently.   - Bacterial (12/5) and fungal (12/4) sputum cultures NGTD  - Monthly blood Coccidioides Ag x12 months post-transplant per ID (urine and serum negative 11/17), due 12/17 (ordered)     Klebsiella pneumoniae:  Pseudomonas fluorescens/putida:   LLL cavity: Klebsiella initially noted trach sputum culture 11/10.  Started on ceftriaxone 11/11, transitioned to ertapenem 11/12-11/13, and back to ceftriaxone 11/14.  Bronch culture 11/12 with Klebsiella and Pseudomonas fluorescens/putida (R-meropenem).  Chest CT 11/22 with LLL cavity as above, BAL with Klebsiella pneumoniae.  Histo Ag 11/22, 11/23 and Blast Ag 11/22 negative.  - ABX: ceftazidime (11/23-12/21) per transplant ID; s/p Zosyn (11/15-11/23) and levofloxacin (11/23-12/7)  - Transplant ID follow up scheduled 12/21 with repeat chest CT prior      Disseminated Candida: Noted on blood cultures 10/20 and 10/22.  BDG fungitell positive (399) on 10/20.  Respiratory cultures with persistent Candida albicans.  TC 10/23 without evidence of endocarditis.  Ophthalmology consult 10/24 with benign dilated fundoscopic exam.  Candida empyema also noted 10/25, chest tubes inadvertently removed by CVTS 10/28, left chest tube replaced by IR 11/3 as above with ongoing Candida on cultures (11/3, 11/18).  BDG fungitell positive (>500) and Aspergillus galactomannan negative 11/22.  - Fluconazole (10/26-12/21) per transplant ID (ETG  12/7 stable)     HSV: Chronic intermittent active infection pre-transplant with recent HSV infection: crusted lesions throughout left side of jaw, s/p 10 day treatment course of ACV through 10/9.  HSV PCR blood negative 10/17.  S/p ACV ppx as above (started POD #1 instead of POD #8 given HSV history and location).     Hypogammaglobulinemia: IgG previously low at 364 (9/7).  Noted at 265 at time of transplant, s/p IVIG 10/21, repeat IgG (11/17) 198, s/p IVIG (with premedication) 11/18.  - Repeat IgG (12/15, ordered)      SALTY: Noted pre-transplant.  Home CPAP 6-12 cm H2O.  - Evaluate need for BiPAP after trach site has healed     Other relevant problems being managed by primary team:     Acute to subacute embolic CVA:   Encephalopathy and diffuse weakness, Improving: Stroke code 10/22 d/t limited movement of BLE, CT head with infarcts in bilateral cerebral hemispheres and left cerebella hemisphere (presumed embolic), no acute intracranial hemorrhage.  MRI 10/23 with multifocal subacute infarcts within both cerebral hemispheres and left cerebellum.  DDx include surgery v embolic v infectious.  Heparin drip started 10/23 (intermittently held with GI bleed as below).  Repeat stroke code 10/25 d/t marked decrease in responsiveness with sedation wean, pupil inequality, and absent gag reflex.  CTA head without obvious new pathology, MRI brain (with and without contrast) primarily revealing for infarct, low likelihood of PRES.  Ammonia normal.  VEEG per neuro with severe diffuse encephalopathy.  Ongoing improvement since 10/29, repeat head CT 11/2 (following code) without new acute intracranial abnormalities.  - AC management per primary team  - PT/OT      Afib with RVR: Noted 10/18, started on amiodarone drip and converted to SR, transitioned to PO 10/21, decreased 10/29.  Metoprolol and amiodarone discontinued 11/19 d/t intermittent bradycardia.  AC per primary team.      Right subclavian DVT: Seen on UE US from  "11/4.  Heparin drip resumed 11/5, transitioned to warfarin 11/20 and in the setting of thrombocytopenia.  HIT negative 11/21.     Hypomagnesemia: Suppressed reabsorption 2/2 CNI.  Requiring intermittent IV and PO replacement.  - Mag-Ox 400 mg BID, increased today to 800 mg qAM / 400 mg qPM (ordered)     Mild thrombocytopenia: Improving but not back to baseline, stable in 110's.     We appreciate the excellent care provided by the [place] [id number] team.  Recommendations communicated via telephone and this note.  Will continue to follow along closely, please do not hesitate to call with any questions or concerns.    Patient discussed with Dr. Mckeon.    Renetta Holloway, DNP, APRN, CNP  Inpatient Nurse Practitioner  Pulmonary CF/Transplant     Subjective & Interval History:     Remains stable on RA with no acute pulmonary complaints, though continues to report dyspnea and occasional chest tightness.  Also endorses anxiety, may be contributing (ramps up prior to dyspnea).  Intermittent cough with thick tenacious secretions (yellow, some old blood streaking).    Review of Systems:     C: No fever, no chills, no change in weight, no change in appetite  INTEGUMENTARY/SKIN: No rash or obvious new lesions  ENT/MOUTH: No sore throat, no sinus pain, + nasal congestion and drainage  RESP: See interval history  CV: No chest pain, no palpitations, no peripheral edema, no orthopnea  GI: No nausea, no vomiting, + stable loose stools, no reflux symptoms  : No dysuria  MUSCULOSKELETAL: No myalgias, no arthralgias  ENDOCRINE: Blood sugars intermittently elevated  NEURO: + occ headache, no new numbness or tingling  PSYCHIATRIC: See interval history    Physical Exam:     Vital signs:  Temp: 97.9  F (36.6  C) Temp src: Oral BP: 127/80 Pulse: 107   Resp: 18 SpO2: 96 % O2 Device: None (Room air) Oxygen Delivery: 2 LPM Height: 162.6 cm (5' 4.02\") Weight: 66.2 kg (146 lb)  I/O:     Intake/Output Summary (Last 24 hours) at 12/8/2021 0846  Last " data filed at 12/8/2021 0700  Gross per 24 hour   Intake 1400 ml   Output 2075 ml   Net -675 ml     Constitutional: Sitting up in bed, in no apparent distress.   HEENT: Eyes with pink conjunctivae, anicteric.  Oral mucosa moist without lesions.  Trach site cdi, decannulated easily, Primapore applied to stoma.  PULM: Diminished bases bilaterally.  No crackles, no rhonchi, no wheezes.  Non-labored breathing on RA.  CV: Normal S1 and S2.  RRR.  No murmur, gallop, or rub.  No peripheral edema.   ABD: NABS, soft, nontender, nondistended.  PEG/J site with minimal erythema.  MSK: Moves all extremities.   NEURO: Alert and oriented, conversant.   SKIN: Warm, dry.  No rash on limited exam. Lesion to right lower lip.  PSYCH: Flat affect, mildly anxious.    Lines, Drains, and Devices:  PICC Triple Lumen 11/04/21 Left Basilic Access. PICC okay to use. (Active)   Site Assessment WDL 12/08/21 0200   External Cath Length (cm) 1 cm 12/05/21 1027   Extremity Circumference (cm) 28 cm 11/28/21 1038   Dressing Intervention Chlorhexidine patch;Transparent 12/08/21 0200   Dressing Change Due 12/12/21 12/08/21 0200   PICC Comment Statlock 12/05/21 1027   Gray - Status saline locked 12/08/21 0200   Gray - Cap Change Due 12/10/21 12/08/21 0200   Red - Status saline locked 12/08/21 0200   Red - Cap Change Due 12/10/21 12/08/21 0200   White - Status infusing 12/08/21 0200   White - Cap Change Due 12/10/21 12/08/21 0200   Extravasation? No 12/08/21 0200   Line Necessity Yes, meets criteria 12/08/21 0200   Number of days: 34     Data:     LABS    CMP:   Recent Labs   Lab 12/08/21  0604 12/08/21  0506 12/08/21  0115 12/07/21  1937 12/07/21  0833 12/07/21  0605 12/06/21  0839 12/06/21  0554 12/05/21  0739 12/05/21  0637     --   --   --   --  139  --  141  --  138   POTASSIUM 4.7  --   --   --   --  4.8  --  4.1  --  4.5   CHLORIDE 106  --   --   --   --  107  --  106  --  105   CO2 27  --   --   --   --  25  --  26  --  27   ANIONGAP 6  --    --   --   --  7  --  9  --  6   * 228* 269* 211*   < > 174*   < > 199*   < > 197*   BUN 35*  --   --   --   --  31*  --  23  --  23   CR 0.70  --   --   --   --  0.68  --  0.65*  --  0.66   GFRESTIMATED >90  --   --   --   --  >90  --  >90  --  >90   ERIK 8.9  --   --   --   --  8.8  --  9.1  --  9.1   MAG 1.7  --   --   --   --  1.9  --  1.4*  --  1.7   PHOS 2.9  --   --   --   --  3.4  --  3.1  --  3.2   PROTTOTAL 5.7*  --   --   --   --  5.8*  --  6.1*  --  5.9*   ALBUMIN 2.3*  --   --   --   --  2.3*  --  2.4*  --  2.3*   BILITOTAL 0.2  --   --   --   --  <0.1*  --  0.2  --  0.2   ALKPHOS 107  --   --   --   --  106  --  114  --  109   AST 17  --   --   --   --  22  --  26  --  21   ALT 25  --   --   --   --  27  --  32  --  27    < > = values in this interval not displayed.     CBC:   Recent Labs   Lab 12/08/21 0604 12/07/21  0605 12/06/21  0554 12/05/21  0637   WBC 8.5 8.7 8.2 9.5   RBC 2.96* 3.01* 3.19* 2.93*   HGB 8.8* 9.0* 9.6* 9.0*   HCT 27.8* 29.0* 30.4* 28.7*   MCV 94 96 95 98   MCH 29.7 29.9 30.1 30.7   MCHC 31.7 31.0* 31.6 31.4*   RDW 14.5 14.6 14.6 14.6   * 128* 113* 115*       INR:   Recent Labs   Lab 12/08/21 0604 12/07/21 0605 12/06/21  0554 12/05/21  0637   INR 2.52* 2.51* 2.35* 2.35*       Glucose:   Recent Labs   Lab 12/08/21  0604 12/08/21  0506 12/08/21  0115 12/07/21  1937 12/07/21  1620 12/07/21  0833   * 228* 269* 211* 112* 133*       Blood Gas: No lab results found in last 7 days.    Culture Data No results for input(s): CULT in the last 168 hours.    Virology Data:   Lab Results   Component Value Date    FLUAH1 Negative 11/22/2021    FLUAH3 Negative 11/22/2021    EB6370 Negative 11/22/2021    IFLUB Negative 11/22/2021    RSVA Negative 11/22/2021    RSVB Negative 11/22/2021    PIV1 Negative 11/22/2021    PIV2 Negative 11/22/2021    PIV3 Negative 11/22/2021    HMPV Negative 11/22/2021    HRVS Negative 11/22/2021    ADVBE Negative 11/22/2021    ADVC Negative  11/22/2021    ADVC Negative 11/12/2021    ADVC Negative 10/17/2021       Historical CMV results (last 3 of prior testing):  Lab Results   Component Value Date    CMVQNT Not Detected 11/22/2021    CMVQNT Not Detected 11/16/2021    CMVQNT Not Detected 11/12/2021     No results found for: CMVLOG    Urine Studies    Recent Labs   Lab Test 11/01/21  1336 10/25/21  1507   URINEPH 5.5 5.5   NITRITE Negative Negative   LEUKEST Negative Negative   WBCU 0 2       Most Recent [facility] Pulmonary Function Testing (FVC/FEV1 only)  No results found for: 20002  No results found for: 20003  No results found for: 20015  No results found for: 20016    IMAGING    Recent Results (from the past 48 hour(s))   XR Chest Port 1 View    Narrative    EXAM: XR CHEST PORT 1 VIEW  12/7/2021 8:13 AM     HISTORY:  Lung tx. Candida empyema.    COMPARISON:  Chest x-ray 12/4/2021 and CT chest 11/22/2021    TECHNIQUE: Portable AP semiupright view of the chest.    FINDINGS:   Patient is rotated.  Tracheostomy tube in stable position. Left PICC line tip is in the mid  SVC.. Stable postsurgical appearance of bilateral lung transplant with  stable sternotomy wires and surgical clips.    Trachea is midline. Stable cardiomediastinal silhouette. No  appreciable pneumothorax. Stable right pleural effusion and loculated  left pleural effusion. Stable bibasilar opacities. Right lower lobe  opacity. Cavitary left lower lung lesion. Chronic right clavicular  deformity. No acute osseous abnormality.      Impression    IMPRESSION:   1. Stable postoperative changes in the chest status post bilateral  lung transplantation.  2. Stable loculated left pleural effusion with overlying atelectasis  and right pleural effusion with right lower lobe opacity corresponding  to the loculated effusion seen on CT 11/22/2021.  3. Stable appearance of left lower lobe cavitary nodule.    I have personally reviewed the examination and initial interpretation  and I agree with the  findings.    DAVIN ANGUIANO MD         SYSTEM ID:  [id number]

## 2021-12-08 NOTE — PROGRESS NOTES
Admitted with ILD complications, now s/p BSLT 10/16 c/b CVA, encephalopathy, acute resp failure, afib RVR, BRENNAN, candida empyema, pleural effusion s/p CT, PEA arrest d/t GIB 11/12. Hx NSIP, RA, bronchiectasis, pHTN, SALTY, HTN, HLD, PLD, hypogammaglobinemia, duodenal anomaly, anxiety, depression    A&Ox4. Anxious. Denies numbness and tingling. Bivona 6 cuffed trach capped. C/o SOB with rest and activity. C/o chest tightness. PRN Albuterol given with effect. VSS on RA. SR/ST 's. Voiding adequately in the urinal. Loose stool. LBM 11/7. PEG-J tube with TF running from 4p-8a in J port. G port clamped. Regular diet. Calorie counts. q4h FSG. Clamshell incision and old CT sites KIM and WNL. Denies pain. Ax1. Gold 11 primary.

## 2021-12-08 NOTE — PROGRESS NOTES
[facility]    Medicine Progress Note - Hospitalist Service       Date of Admission:  9/5/2021    Assessment & Plan           Edson Thornton is a 55 yo M with PMHx of NSIP/ILD 2/2 Rheumatoid lung disease, RA, bronchiectasis, moderately pulm HTN, SALTY, HTN, HLD, PLD, hypogammaglobinemia, duodenal anomaly, anxiety, and depression s/p bilateral lung transplant on 10/16/2021, with post operative course complicated by prolonged vent wean s/p trach and PEG/J tube placement with thoracic surgery on 10/29. Post-op course complicated encephalopathy, and diffuse weakness, acute to subacute by CVA, afib with RVR, BRENNAN, candidemia/candida empyema, and Code Blue due to bradycardia/asystole and hypotension for significant GIB s/p EGD with adherent clot near PEG tube site s/p clips. Transferred from ICU to medicine on 11/23/2021.  Likely to be decannulated 12/8.  Ready for discharge to area.     ILD and NSIP s/p BSLT on 10/16/2021  Acute hypoxic respiratory failure s/p tracheostomy on 10/29  Bilateral pleural effusions   - Transplant pulmonology following, appreciate recs   - IS: s/p basiliximab on 10/16 and 10/20. Continue tacrolimus, MMF, prednisone per transplant pulmonary dosing  - Ppx: Continue bactrim 400-80 mg daily, nystatin QID x6 months  - Monthly Coccidioides no longer suggested by ID  - DSA every 2 weeks   - Levalbuterol and mucomyst QID and pulm toilet and chest PT QID  - PT/OT/SLP   - Diuresis at 40 mg lasix daily   - Oxycodone 5-10 mg Q4H  - Schedule tylenol     Left lung cavitary lesion   Suspect aspiration vs pseudomonal vs K pneumonia induced abscess. CT on 10/25 with LLL nodular opacity. Repeat CT chest on 11/22 with new cavitary lesion in the left lung base, and with multifocal bilateral upper lobe GGO (some of which are new), new 1.8 cm fluid collection with surrounding fat stranding in the left axilla, decreased bilateral loculated pleural effusions. Indeterminate  Quant Gold, but negative tuberculin skin tests on mulitple occurences. S/p BAL on 11/22. ID feels less likely fungal. B D glucan positive but has known candidemia. Cocci ag in urine negatie, serum histo negative, CRAG negative  - Transplant ID following   - ID recommends stopping zosyn (11/15-11/23), start Ceftazdime 2 grams Q8H (until 12/21) and levaquin 750 mg daily (until 12/07)  - Follow up CT chest in 4 weeks 12/21  - Please include in the patient's discharge instructions, follow up with Dr. Abebe on 12/21/2021 @6:30 PM in a virtual visit.   - No indicated labs outside of post-transplant labs.     Klebsiella pneumoniae and Pseudomonas fluorescens/putida HAP  Klebsiella initially noted trach sputum culture 11/10. Bronch culture 11/12 with Klebsiella and Pseudomonas fluorescens/putida (R-meropenem).     - Abx as above      Invasive candidiasis with Candida albicans  Positive candida BCx 10/20 and 10/22.  BDG fungitell positive (399) on 10/20. Sputum Cx + Candida albicans persistently.  TC 10/23 without evidence of endocarditis. Ophthalmology consult 10/24 with benign dilated fundoscopic exam.  Candida empyema also noted 10/25, chest tubes inadvertently removed by CVTS 10/28, left chest tube replaced by IR 11/3 as above with ongoing Candida on cultures. Repeat pleural fungal cultures and fungal/bacterial blood cultures on 11/18 NGTD. Transplant as noted above following   -Left chest tube removed 12/1  - Initially on Micafungin (10/22-10/27) transition to fluconazole 400 mg IV daily (start date 10/26 end date 12/21)     Hypogammaglobinemia  Received IVIG with plan to repeat IgG on 12/15.    Encephalopathy, resolved  Likely multi-factorial in the setting of stroke, prolonged critical illness.  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 MRI brain on 10/23 with  multifocal subacute infarct within both cerebral hemispheres and left cerebellum. Presumed embolic with afib hx.   - Anticoagulation as noted below      Atrial fibrillation  [id number] for HTN, CVA, and DM2. First noted on 10/18, started on amiodarone drip, and converted to NSR. Metoprolol and amidoarone discontinued on 11/20 due to intermittent bradycardia.   - Anticoagulation with warfarin as noted below  - Continue Rosuvastatin 10 mg daily      Right subclavian DVT   dx on 11/4. Resumed on heparin drip on 11/5 and transitioned to warfarin in setting of thrombocytopenia. HIT panel negative on 11/21.   - Continue warfarin per pharmacy protocol     DM2 with steroid induced hyperglycemia   Hemoglobin A1c 6.6 pre-operatively. Endocrine recently consulted for steroids induced hyperglycemia.   - Continue Lantus 30 mg BID  - Novolog High Sliding scale insulin Q4H  - BG Q4H      Diarrhea   C. Diff negative on 11/20. Rectal tube in place on 11/22. Possibly due to tube feeds or magnesium supplementation.   - Monitor I&O      Severe malnutrition in the context of acute on chronic illness  Patient currently has a PEG J-tube.  -Multivitamin, folic acid, B6, B12 supplements   - TF per nutrition; continue oral diet as well  -Hopefully over the next couple of weeks we can decrease tube feeds and allow the patient to eat more food by mouth     Anxiety and depression   Psychiatry reconsulted and after discussion suggest increasing lexapro. Will discuss with patient regarding starting ritalin in the AM for motivation.  - Lexapro increased to 10 mg daily could increase to 20 in a week ~12/12  -As needed hydroxyzine    Concern for chipped tooth  Poor dental hygiene  Patient noted he feels like he may have chipped his tooth or has a loose tooth after eating guevara. It is not painful.   - Dental hygiene consult placed and chipped tooth stable rec oral hygiene cares     ------ Chronic stable medical problems ------     RA- Dx 5/2021  with + CCP ab and RF. Previously treated with rituximab. Rheum consulted early in admission. On steroids as noted above.   SALTY - Uses CPAP at bedtime prior to admission. Will need to evaluate for BiPAP after decannulation   HTN- Continue PTA amlodipine 5 mg daily. PRN labetalol and hydralazine for goal SBP <140   Hypothyroidism - TSH 3.17 on 11/19/21. Continue PTA levothyroxine 25 mcg daily      ------ Resolved Hospital problems -------     Recurrent GIB - hgb drop on 10/22 s/p 2 unit pRBC transfusion with EGD on 10/23 with NJ/OG tube trauma with scant oozing. Patient then developed progressive hypotension and ultimately CODE BLUE for GIB in setting of anticoagulation with heparin drip, s/p MTP. EGD on 11/2 with large amount of clotted blood in stomach and area of raised mucosa with small adherent clot near PEG tube site that was clipped, no active bleeding.  Maroon stools 11/3, repeat EGD with extensive old blood in stomach, no active bleeding, small nodular area with prior clips clipped again.  Most recent EGD 11/5 with ulcer noted at PEG tube bumper site, gastritis, and suction marks from G tube. TF noted in G tube drainage bag 11/7, AXR with GJ tube tip projecting over proximal duodenum. GJ tube exchanged 11/9 by GI.  - PO PPI BID      HSV  Chronic intermittent active infection pre-transplant with recent HSV infection: crusted lesions throughout left side of jaw, s/p 10 day treatment course of ACV through 10/9.  HSV PCR blood negative 10/17.  S/p ACV ppx as above (started POD #1 instead of POD #8 given HSV history and location).     Hypernatremia, resolved  Due to inability to have oral intake. Resolved now that he is PO and able to access water.       Diet: Regular Diet Adult  Adult Formula Drip Feeding: Continuous Vital 1.5; Jejunostomy; Goal Rate: 80; mL/hr; From: 4:00 PM; 8:00 AM; Medication - Feeding Tube Flush Frequency: See free water flush order. At least 15-30 mL water before and after medication  administration ...  Calorie Counts  Snacks/Supplements Adult: Ensure Clear; Between Meals    DVT Prophylaxis: Heparin SQ  Watson Catheter: Not present  Central Lines: None  Code Status: Full Code      Disposition Plan   Expected discharge: medically ready to discharge to TCU recommended to transitional care unit once safe disposition plan/ TCU bed available.     The patient's care was discussed with the Patient and Pulm Consultant.    Gume Baldwin MD  Hospitalist Service  [facility]  Securely message with the Vocera Web Console (learn more here)  Text page via [place] Paging/Directory    Please see sign in/sign out for up to date coverage information    Clinically Significant Risk Factors Present on Admission                ______________________________________________________________________    Interval History   Patient seen and examined. No acute events overnight.  Continues to have cough and is able to mobilize some secretions.  He is not sleeping well mostly because of interruptions overnight including lab draws and vital sign checks.  He is fairly frustrated about not being able to sleep night.  He otherwise feels okay and is looking forward to hospital discharge.    Four point ROS completed and negative unless listed above    Data reviewed today: I reviewed all medications, new labs and imaging results over the last 24 hours.    Physical Exam   Vital Signs: Temp: 97.9  F (36.6  C) Temp src: Oral BP: 127/80 Pulse: 119 (ADL task )   Resp: 18 SpO2: 96 % O2 Device: None (Room air)    Weight: 146 lbs 0 oz  General Appearance: Patient is in no acute distress.  He appears comfortable sitting in bed.  He appears stated age.  Respiratory: Rhonchi noted in both lung fields.  Breath sounds are relatively coarse but he has good air movement and good chest excursion.  Cardiovascular: RRR S1/S2, extremities are warm and well perfused  GI: soft, nontender, nondistended  Skin:  Warm, no jaundice  Neuro: Alert and oriented to person, place, time, situation.  Speech is normal.  Moves all extremities symmetrically and equally.    Data   Recent Labs   Lab 12/08/21  0921 12/08/21  0604 12/08/21  0506 12/07/21  0833 12/07/21  0605 12/06/21  0839 12/06/21  0554   WBC  --  8.5  --   --  8.7  --  8.2   HGB  --  8.8*  --   --  9.0*  --  9.6*   MCV  --  94  --   --  96  --  95   PLT  --  118*  --   --  128*  --  113*   INR  --  2.52*  --   --  2.51*  --  2.35*   NA  --  139  --   --  139  --  141   POTASSIUM  --  4.7  --   --  4.8  --  4.1   CHLORIDE  --  106  --   --  107  --  106   CO2  --  27  --   --  25  --  26   BUN  --  35*  --   --  31*  --  23   CR  --  0.70  --   --  0.68  --  0.65*   ANIONGAP  --  6  --   --  7  --  9   ERIK  --  8.9  --   --  8.8  --  9.1   * 177* 228*   < > 174*   < > 199*   ALBUMIN  --  2.3*  --   --  2.3*  --  2.4*   PROTTOTAL  --  5.7*  --   --  5.8*  --  6.1*   BILITOTAL  --  0.2  --   --  <0.1*  --  0.2   ALKPHOS  --  107  --   --  106  --  114   ALT  --  25  --   --  27  --  32   AST  --  17  --   --  22  --  26    < > = values in this interval not displayed.     No results found for this or any previous visit (from the past 24 hour(s)).  Medications     dextrose Stopped (10/24/21 1008)     Warfarin Therapy Reminder         acetaminophen  975 mg Oral or Feeding Tube Q8H     acetylcysteine  2 mL Nebulization TID     amLODIPine  5 mg Oral Daily     calcium carbonate 600 mg-vitamin D 400 units  1 tablet Oral or Feeding Tube BID w/meals     cefTAZidime  2 g Intravenous Q8H     cyclobenzaprine  10 mg Oral At Bedtime     escitalopram  10 mg Oral or Feeding Tube Daily     fiber modular (NUTRISOURCE FIBER)  1 packet Per Feeding Tube BID     fluconazole  400 mg Intravenous Q24H     fluticasone  1 spray Both Nostrils Daily     folic acid-vit B6-vit B12  1 tablet Oral Daily     furosemide  40 mg Oral Daily     heparin lock flush  5-20 mL Intracatheter Q24H     insulin  aspart  1-12 Units Subcutaneous Q4H     insulin glargine  30 Units Subcutaneous BID     levalbuterol  1.25 mg Nebulization TID     levothyroxine  25 mcg Oral Daily     lidocaine  2 patch Transdermal Q24H     lidocaine   Transdermal Q8H     [START ON 12/9/2021] magnesium oxide  800 mg Oral QAM    And     magnesium oxide  400 mg Oral QPM     melatonin  10 mg Oral QPM     menthol   Transdermal Q8H     multivitamin w/minerals  1 tablet Oral Daily     mycophenolate  1,000 mg Oral BID     nystatin  1,000,000 Units Swish & Swallow 4x Daily     pantoprazole  40 mg Oral BID AC     predniSONE  10 mg Oral or Feeding Tube BID     QUEtiapine  25 mg Oral or Feeding Tube BID     QUEtiapine  50 mg Oral At Bedtime     rosuvastatin  10 mg Oral or Feeding Tube Daily     sodium chloride (PF)  10-40 mL Intracatheter Q8H     sodium chloride (PF)  3 mL Intracatheter Q8H     sulfamethoxazole-trimethoprim  1 tablet Oral or Feeding Tube Daily     tacrolimus  2.5 mg Oral QAM     tacrolimus  2.5 mg Oral QPM     warfarin ANTICOAGULANT  1 mg Oral ONCE at 18:00

## 2021-12-08 NOTE — PLAN OF CARE
Code Status: Full  Team: Gold 11    D: .Admitted with ILD complications, now s/p BSLT 10/16 c/b CVA, encephalopathy, acute resp failure, afib RVR, BRENNAN, candida empyema, pleural effusion s/p CT, PEA arrest d/t GIB 11/12. Hx NSIP, RA, bronchiectasis, pHTN, SALTY, HTN, HLD, PLD, hypogammaglobinemia, duodenal anomaly, anxiety, depression    I: Monitored Vitals and assessed patients status      Running: tube feeds 80ml/hr q4 hour flush 100 ml in J port (4p-8am)         A: A&Ox4, VSS on RA, bivona 6 cuffed trach capped, Tele Shows Sinus rhythm/ Sinus tach,  afebrile, urinating adequately in the urinal, regular diet, calorie counts, clamshell incision open to air WNL.      P: Continue to monitor pt status and report changes to treatment team    [id number]

## 2021-12-08 NOTE — CONSULTS
[facility]   PM&R CONSULT    Consulting Provider: Gume Mercado MD  Reason for Consult: Assessment of rehabilitation   Location of Patient: 6411  Date of Encounter: 12/8/2021   Date of Admission: 9/5/2021        ASSESSMENT/PLAN:        HPI:      Edson Thornton is a 57 yo M with PMHx of NSIP/ILD 2/2 Rheumatoid lung disease, RA, bronchiectasis, moderately pulm HTN, SALTY, HTN, HLD, PLD, hypogammaglobinemia, duodenal anomaly, anxiety, and depression s/p bilateral lung transplant on 10/16/2021, with post operative course complicated by prolonged vent wean s/p trach and PEG/J tube placement with thoracic surgery on 10/29. Post-op course complicated encephalopathy, and diffuse weakness, acute to subacute by CVA, afib with RVR, BRENNAN, candidemia/candida empyema, and Code Blue due to bradycardia/asystole and hypotension for significant GIB s/p EGD with adherent clot near PEG tube site s/p clips. Transferred from ICU to medicine on 11/23/2021.  Likely to be decannulated 12/8.  Ready for discharge to area    The patient is below is prior functional level. He has been hospitalized since September 5th and has significant deconditioning. He did also have CVA while hospitalized and now has RUE weakness and bilateral dysmetria. There can also be contribution from Tacrolimus. He is recovering well. He is requiring cues for safety and SBA/CGA for transfers from . He is standing and walking with a FWW. Other deficits include cognition and vision. His fiance will be available upon discharge to provide 24/7 support if needed. He would be a good candidate for acute inpatient rehabilitation given his numerous functional deficits and medical complexity.     Recommendations:  -Discharge to ARU for 2-3 week stay  -Will need OT, PT, and SLP    PREVIOUS LEVEL OF FUNCTION   At baseline he is independent in ADLs and IADLs and uses no assistive device. More recently prior to hospitalization he had become  increasingly short of breath and was dependent upon spouse for ADL/IADLs, needs help getting dressed due to shortness of breath.    CURRENT FUNCTION   PT: transferring to and from WC and bed with CGA and Wolfgang    OT: supine to sitting at EOB with SBA    SLP: Recommend pt initiate regular textures and thin liquids. Pt should sit fully upright for all PO, wear PMSV for PO intake, slow pacing, alternate between consistencies, and take small sips/bites.      LIVING SITUATION/SUPPORT  Spouse in house with stairs to enter      Past Medical History:  Past Medical History:   Diagnosis Date     BRENNAN (acute kidney injury) (H) 10/17/2021     Anxiety      Depression      HLD (hyperlipidemia)      HTN (hypertension)      Hypothyroidism      ILD (interstitial lung disease) (H)      SALTY on CPAP      Oxygen dependent     BL 4L since ~6/2021     Rheumatoid arthritis (H)     signs ~5/2020, dx 5/2021     S/P lung transplant (H) 10/16/2021     Shock liver 10/17/2021     Steroid-induced hyperglycemia      Traction bronchiectasis (H)            Current Medications:  Current Facility-Administered Medications   Medication     acetaminophen (TYLENOL) tablet 975 mg     acetylcysteine (MUCOMYST) 20 % nebulizer solution 2 mL     albuterol (PROVENTIL) neb solution 2.5 mg     amLODIPine (NORVASC) tablet 5 mg     bisacodyl (DULCOLAX) Suppository 10 mg     calcium carbonate 600 mg-vitamin D 400 units (CALTRATE) per tablet 1 tablet     cefTAZidime (FORTAZ) 2 g vial to attach to  ml bag for ADULTS or NS 50 ml bag for PEDS     cyclobenzaprine (FLEXERIL) tablet 10 mg     dextrose 10% infusion     glucose gel 15-30 g    Or     dextrose 50 % injection 25-50 mL    Or     glucagon injection 1 mg     escitalopram (LEXAPRO) tablet 10 mg     fiber modular (NUTRISOURCE FIBER) packet 1 packet     fluconazole (DIFLUCAN) intermittent infusion 400 mg in NaCl     fluticasone (FLONASE) 50 MCG/ACT spray 1 spray     folic acid-vit B6-vit B12 (FOLGARD) per tablet  1 tablet     furosemide (LASIX) tablet 40 mg     heparin lock flush 10 UNIT/ML injection 5-20 mL     heparin lock flush 10 UNIT/ML injection 5-20 mL     hydrALAZINE (APRESOLINE) injection 20 mg     hydrOXYzine (ATARAX) tablet 25 mg    Or     hydrOXYzine (ATARAX) tablet 50 mg     insulin aspart (NovoLOG) injection (RAPID ACTING)     insulin glargine (LANTUS PEN) injection 30 Units     labetalol (NORMODYNE/TRANDATE) injection 20 mg     levalbuterol (XOPENEX) neb solution 1.25 mg     levothyroxine (SYNTHROID/LEVOTHROID) tablet 25 mcg     Lidocaine (LIDOCARE) 4 % Patch 2 patch     lidocaine (XYLOCAINE) 2 % solution 5 mL     lidocaine patch in PLACE     loperamide (IMODIUM) capsule 2 mg     magnesium hydroxide (MILK OF MAGNESIA) suspension 30 mL     [START ON 12/9/2021] magnesium oxide (MAG-OX) tablet 800 mg    And     magnesium oxide (MAG-OX) tablet 400 mg     melatonin tablet 10 mg     menthol (ICY HOT) 5 % patch 1 patch    And     menthol (ICY HOT) Patch in Place     multivitamin w/minerals (THERA-VIT-M) tablet 1 tablet     mycophenolate (GENERIC EQUIVALENT) capsule 1,000 mg     naloxone (NARCAN) injection 0.2 mg    Or     naloxone (NARCAN) injection 0.4 mg    Or     naloxone (NARCAN) injection 0.2 mg    Or     naloxone (NARCAN) injection 0.4 mg     nystatin (MYCOSTATIN) suspension 1,000,000 Units     ondansetron (ZOFRAN-ODT) ODT tab 4 mg    Or     ondansetron (ZOFRAN) injection 4 mg     oxyCODONE (ROXICODONE) tablet 5-10 mg     oxymetazoline (AFRIN) 0.05 % spray 2 spray     pantoprazole (PROTONIX) EC tablet 40 mg     predniSONE (DELTASONE) tablet 10 mg     prochlorperazine (COMPAZINE) injection 10 mg    Or     prochlorperazine (COMPAZINE) tablet 10 mg     QUEtiapine (SEROquel) tablet 25 mg     QUEtiapine (SEROquel) tablet 25 mg     QUEtiapine (SEROquel) tablet 50 mg     rosuvastatin (CRESTOR) tablet 10 mg     senna-docusate (SENOKOT-S/PERICOLACE) 8.6-50 MG per tablet 1 tablet     sodium chloride (PF) 0.9% PF flush  "10-20 mL     sodium chloride (PF) 0.9% PF flush 10-40 mL     sodium chloride (PF) 0.9% PF flush 3 mL     sodium chloride (PF) 0.9% PF flush 3 mL     sulfamethoxazole-trimethoprim (BACTRIM) 400-80 MG per tablet 1 tablet     tacrolimus (GENERIC EQUIVALENT) capsule 2.5 mg     tacrolimus (GENERIC EQUIVALENT) capsule 2.5 mg     warfarin ANTICOAGULANT (COUMADIN) tablet 1 mg     Warfarin Therapy Reminder (Check START DATE - warfarin may be starting in the FUTURE)     zinc-white petrolatum (ILEX) 58.3 % paste         On examination   /80 (BP Location: Right arm)   Pulse 94   Temp 98.2  F (36.8  C) (Oral)   Resp 18   Ht 1.626 m (5' 4.02\")   Wt 66.2 kg (146 lb)   SpO2 95%   BMI 25.05 kg/m      Physical Exam  VITAL SIGNS:  /75 (BP Location: Right arm)   Pulse 99   Temp 97.7  F (36.5  C) (Oral)   Resp 22   Ht 1.626 m (5' 4.02\")   Wt 66.2 kg (146 lb)   SpO2 94%   BMI 25.05 kg/m    BMI:  Estimated body mass index is 25.05 kg/m  as calculated from the following:    Height as of this encounter: 1.626 m (5' 4.02\").    Weight as of this encounter: 66.2 kg (146 lb).     General: NAD, pleasant and cooperative  HEENT: EOMI  Pulmonary: Dyspnea on exertion  Cardiovascular: Acyanotic  MSK/neuro: bilateral dysmetria  Motor  RUE4/5  LUE 5/5  RLE 5/5  LLE 5/5    Light Touch  Normal sensation to light touch in all 4 extremities    Finger to nose: impaired, tremors          Labs   Lab Results   Component Value Date    WBC 8.5 12/08/2021    HGB 8.8 (L) 12/08/2021    HCT 27.8 (L) 12/08/2021    MCV 94 12/08/2021     (L) 12/08/2021     Lab Results   Component Value Date     12/08/2021    POTASSIUM 4.7 12/08/2021    CHLORIDE 106 12/08/2021    CO2 27 12/08/2021     (H) 12/08/2021     Lab Results   Component Value Date    GFRESTIMATED >90 12/08/2021     Lab Results   Component Value Date    AST 17 12/08/2021    ALT 25 12/08/2021    ALKPHOS 107 12/08/2021    BILITOTAL 0.2 12/08/2021    DOREEN 25 11/03/2021 "     Lab Results   Component Value Date    INR 2.52 (H) 12/08/2021     Lab Results   Component Value Date    BUN 35 (H) 12/08/2021    CR 0.70 12/08/2021       The patient's assessment and plan was discussed with my attending physician Dr. Romeo Ayala, DO  PGY-4 PM&R Resident

## 2021-12-09 ENCOUNTER — APPOINTMENT (OUTPATIENT)
Dept: PHYSICAL THERAPY | Facility: CLINIC | Age: 56
End: 2021-12-09
Attending: STUDENT IN AN ORGANIZED HEALTH CARE EDUCATION/TRAINING PROGRAM
Payer: COMMERCIAL

## 2021-12-09 LAB
ANION GAP SERPL CALCULATED.3IONS-SCNC: 5 MMOL/L (ref 3–14)
BUN SERPL-MCNC: 28 MG/DL (ref 7–30)
CALCIUM SERPL-MCNC: 9.1 MG/DL (ref 8.5–10.1)
CHLORIDE BLD-SCNC: 105 MMOL/L (ref 94–109)
CO2 SERPL-SCNC: 28 MMOL/L (ref 20–32)
CREAT SERPL-MCNC: 0.64 MG/DL (ref 0.66–1.25)
ERYTHROCYTE [DISTWIDTH] IN BLOOD BY AUTOMATED COUNT: 14.6 % (ref 10–15)
GFR SERPL CREATININE-BSD FRML MDRD: >90 ML/MIN/1.73M2
GLUCOSE BLD-MCNC: 153 MG/DL (ref 70–99)
GLUCOSE BLDC GLUCOMTR-MCNC: 103 MG/DL (ref 70–99)
GLUCOSE BLDC GLUCOMTR-MCNC: 145 MG/DL (ref 70–99)
GLUCOSE BLDC GLUCOMTR-MCNC: 149 MG/DL (ref 70–99)
GLUCOSE BLDC GLUCOMTR-MCNC: 188 MG/DL (ref 70–99)
GLUCOSE BLDC GLUCOMTR-MCNC: 205 MG/DL (ref 70–99)
HCT VFR BLD AUTO: 29.3 % (ref 40–53)
HGB BLD-MCNC: 9.5 G/DL (ref 13.3–17.7)
INR PPP: 2.18 (ref 0.85–1.15)
MCH RBC QN AUTO: 30.2 PG (ref 26.5–33)
MCHC RBC AUTO-ENTMCNC: 32.4 G/DL (ref 31.5–36.5)
MCV RBC AUTO: 93 FL (ref 78–100)
PLATELET # BLD AUTO: 142 10E3/UL (ref 150–450)
POTASSIUM BLD-SCNC: 4.7 MMOL/L (ref 3.4–5.3)
RBC # BLD AUTO: 3.15 10E6/UL (ref 4.4–5.9)
SARS-COV-2 RNA RESP QL NAA+PROBE: NEGATIVE
SODIUM SERPL-SCNC: 138 MMOL/L (ref 133–144)
WBC # BLD AUTO: 9.2 10E3/UL (ref 4–11)

## 2021-12-09 PROCEDURE — 85027 COMPLETE CBC AUTOMATED: CPT | Performed by: INTERNAL MEDICINE

## 2021-12-09 PROCEDURE — 250N000013 HC RX MED GY IP 250 OP 250 PS 637: Performed by: STUDENT IN AN ORGANIZED HEALTH CARE EDUCATION/TRAINING PROGRAM

## 2021-12-09 PROCEDURE — 99232 SBSQ HOSP IP/OBS MODERATE 35: CPT | Mod: 24 | Performed by: NURSE PRACTITIONER

## 2021-12-09 PROCEDURE — 97530 THERAPEUTIC ACTIVITIES: CPT | Mod: GP

## 2021-12-09 PROCEDURE — 250N000012 HC RX MED GY IP 250 OP 636 PS 637: Performed by: PHYSICIAN ASSISTANT

## 2021-12-09 PROCEDURE — 85610 PROTHROMBIN TIME: CPT | Performed by: STUDENT IN AN ORGANIZED HEALTH CARE EDUCATION/TRAINING PROGRAM

## 2021-12-09 PROCEDURE — 250N000013 HC RX MED GY IP 250 OP 250 PS 637: Performed by: NURSE PRACTITIONER

## 2021-12-09 PROCEDURE — 80048 BASIC METABOLIC PNL TOTAL CA: CPT | Performed by: INTERNAL MEDICINE

## 2021-12-09 PROCEDURE — 94668 MNPJ CHEST WALL SBSQ: CPT

## 2021-12-09 PROCEDURE — 250N000013 HC RX MED GY IP 250 OP 250 PS 637: Performed by: ANESTHESIOLOGY

## 2021-12-09 PROCEDURE — U0003 INFECTIOUS AGENT DETECTION BY NUCLEIC ACID (DNA OR RNA); SEVERE ACUTE RESPIRATORY SYNDROME CORONAVIRUS 2 (SARS-COV-2) (CORONAVIRUS DISEASE [COVID-19]), AMPLIFIED PROBE TECHNIQUE, MAKING USE OF HIGH THROUGHPUT TECHNOLOGIES AS DESCRIBED BY CMS-2020-01-R: HCPCS | Performed by: STUDENT IN AN ORGANIZED HEALTH CARE EDUCATION/TRAINING PROGRAM

## 2021-12-09 PROCEDURE — 97110 THERAPEUTIC EXERCISES: CPT | Mod: GP

## 2021-12-09 PROCEDURE — 250N000011 HC RX IP 250 OP 636: Performed by: STUDENT IN AN ORGANIZED HEALTH CARE EDUCATION/TRAINING PROGRAM

## 2021-12-09 PROCEDURE — 214N000001 HC R&B CCU UMMC

## 2021-12-09 PROCEDURE — 99232 SBSQ HOSP IP/OBS MODERATE 35: CPT | Performed by: STUDENT IN AN ORGANIZED HEALTH CARE EDUCATION/TRAINING PROGRAM

## 2021-12-09 PROCEDURE — 250N000012 HC RX MED GY IP 250 OP 636 PS 637: Performed by: NURSE PRACTITIONER

## 2021-12-09 PROCEDURE — 94640 AIRWAY INHALATION TREATMENT: CPT | Mod: 76

## 2021-12-09 PROCEDURE — 97116 GAIT TRAINING THERAPY: CPT | Mod: GP

## 2021-12-09 PROCEDURE — 94640 AIRWAY INHALATION TREATMENT: CPT

## 2021-12-09 PROCEDURE — 999N000157 HC STATISTIC RCP TIME EA 10 MIN

## 2021-12-09 PROCEDURE — 250N000012 HC RX MED GY IP 250 OP 636 PS 637: Performed by: STUDENT IN AN ORGANIZED HEALTH CARE EDUCATION/TRAINING PROGRAM

## 2021-12-09 PROCEDURE — 250N000009 HC RX 250: Performed by: NURSE PRACTITIONER

## 2021-12-09 PROCEDURE — 250N000012 HC RX MED GY IP 250 OP 636 PS 637: Performed by: INTERNAL MEDICINE

## 2021-12-09 PROCEDURE — 250N000013 HC RX MED GY IP 250 OP 250 PS 637: Performed by: THORACIC SURGERY (CARDIOTHORACIC VASCULAR SURGERY)

## 2021-12-09 RX ORDER — WARFARIN SODIUM 1 MG/1
2 TABLET ORAL
Status: COMPLETED | OUTPATIENT
Start: 2021-12-09 | End: 2021-12-09

## 2021-12-09 RX ADMIN — TACROLIMUS 2.5 MG: 1 CAPSULE ORAL at 17:10

## 2021-12-09 RX ADMIN — LEVOTHYROXINE SODIUM 25 MCG: 0.03 TABLET ORAL at 09:18

## 2021-12-09 RX ADMIN — Medication 1 TABLET: at 09:17

## 2021-12-09 RX ADMIN — CEFTAZIDIME 2 G: 2 INJECTION, POWDER, FOR SOLUTION INTRAVENOUS at 12:16

## 2021-12-09 RX ADMIN — PANTOPRAZOLE SODIUM 40 MG: 40 TABLET, DELAYED RELEASE ORAL at 06:30

## 2021-12-09 RX ADMIN — TACROLIMUS 2.5 MG: 1 CAPSULE ORAL at 09:17

## 2021-12-09 RX ADMIN — CEFTAZIDIME 2 G: 2 INJECTION, POWDER, FOR SOLUTION INTRAVENOUS at 03:02

## 2021-12-09 RX ADMIN — ESCITALOPRAM OXALATE 10 MG: 10 TABLET ORAL at 09:18

## 2021-12-09 RX ADMIN — QUETIAPINE FUMARATE 25 MG: 25 TABLET ORAL at 09:18

## 2021-12-09 RX ADMIN — INSULIN ASPART 1 UNITS: 100 INJECTION, SOLUTION INTRAVENOUS; SUBCUTANEOUS at 21:19

## 2021-12-09 RX ADMIN — Medication 1 PACKET: at 09:21

## 2021-12-09 RX ADMIN — ACETYLCYSTEINE 2 ML: 200 SOLUTION ORAL; RESPIRATORY (INHALATION) at 08:29

## 2021-12-09 RX ADMIN — FLUCONAZOLE IN SODIUM CHLORIDE 400 MG: 2 INJECTION, SOLUTION INTRAVENOUS at 09:22

## 2021-12-09 RX ADMIN — PREDNISONE 10 MG: 10 TABLET ORAL at 20:57

## 2021-12-09 RX ADMIN — CEFTAZIDIME 2 G: 2 INJECTION, POWDER, FOR SOLUTION INTRAVENOUS at 20:57

## 2021-12-09 RX ADMIN — ACETYLCYSTEINE 2 ML: 200 SOLUTION ORAL; RESPIRATORY (INHALATION) at 14:52

## 2021-12-09 RX ADMIN — Medication 800 MG: at 09:17

## 2021-12-09 RX ADMIN — LEVALBUTEROL HYDROCHLORIDE 1.25 MG: 1.25 SOLUTION RESPIRATORY (INHALATION) at 08:29

## 2021-12-09 RX ADMIN — HYDROXYZINE HYDROCHLORIDE 25 MG: 25 TABLET, FILM COATED ORAL at 14:30

## 2021-12-09 RX ADMIN — INSULIN ASPART 2 UNITS: 100 INJECTION, SOLUTION INTRAVENOUS; SUBCUTANEOUS at 23:03

## 2021-12-09 RX ADMIN — NYSTATIN 1000000 UNITS: 500000 SUSPENSION ORAL at 12:16

## 2021-12-09 RX ADMIN — Medication 1 PACKET: at 21:16

## 2021-12-09 RX ADMIN — MYCOPHENOLATE MOFETIL 1000 MG: 250 CAPSULE ORAL at 20:56

## 2021-12-09 RX ADMIN — ROSUVASTATIN CALCIUM 10 MG: 10 TABLET, FILM COATED ORAL at 09:17

## 2021-12-09 RX ADMIN — ACETAMINOPHEN 975 MG: 325 TABLET, FILM COATED ORAL at 14:31

## 2021-12-09 RX ADMIN — WARFARIN SODIUM 2 MG: 1 TABLET ORAL at 17:10

## 2021-12-09 RX ADMIN — Medication 400 MG: at 17:11

## 2021-12-09 RX ADMIN — LEVALBUTEROL HYDROCHLORIDE 1.25 MG: 1.25 SOLUTION RESPIRATORY (INHALATION) at 14:52

## 2021-12-09 RX ADMIN — NYSTATIN 1000000 UNITS: 500000 SUSPENSION ORAL at 17:10

## 2021-12-09 RX ADMIN — INSULIN GLARGINE 30 UNITS: 100 INJECTION, SOLUTION SUBCUTANEOUS at 09:16

## 2021-12-09 RX ADMIN — CALCIUM CARBONATE 600 MG (1,500 MG)-VITAMIN D3 400 UNIT TABLET 1 TABLET: at 09:18

## 2021-12-09 RX ADMIN — INSULIN ASPART 1 UNITS: 100 INJECTION, SOLUTION INTRAVENOUS; SUBCUTANEOUS at 09:16

## 2021-12-09 RX ADMIN — ACETYLCYSTEINE 2 ML: 200 SOLUTION ORAL; RESPIRATORY (INHALATION) at 20:25

## 2021-12-09 RX ADMIN — NYSTATIN 1000000 UNITS: 500000 SUSPENSION ORAL at 20:55

## 2021-12-09 RX ADMIN — Medication 10 MG: at 20:57

## 2021-12-09 RX ADMIN — QUETIAPINE FUMARATE 50 MG: 50 TABLET ORAL at 20:57

## 2021-12-09 RX ADMIN — ACETAMINOPHEN 975 MG: 325 TABLET, FILM COATED ORAL at 23:00

## 2021-12-09 RX ADMIN — FLUTICASONE PROPIONATE 1 SPRAY: 50 SPRAY, METERED NASAL at 12:29

## 2021-12-09 RX ADMIN — QUETIAPINE FUMARATE 25 MG: 25 TABLET ORAL at 14:31

## 2021-12-09 RX ADMIN — PREDNISONE 10 MG: 10 TABLET ORAL at 09:17

## 2021-12-09 RX ADMIN — INSULIN GLARGINE 30 UNITS: 100 INJECTION, SOLUTION SUBCUTANEOUS at 21:19

## 2021-12-09 RX ADMIN — AMLODIPINE BESYLATE 5 MG: 2.5 TABLET ORAL at 09:16

## 2021-12-09 RX ADMIN — FUROSEMIDE 40 MG: 40 TABLET ORAL at 09:18

## 2021-12-09 RX ADMIN — ACETAMINOPHEN 975 MG: 325 TABLET, FILM COATED ORAL at 06:30

## 2021-12-09 RX ADMIN — CYCLOBENZAPRINE HYDROCHLORIDE 10 MG: 5 TABLET, FILM COATED ORAL at 23:00

## 2021-12-09 RX ADMIN — MYCOPHENOLATE MOFETIL 1000 MG: 250 CAPSULE ORAL at 09:16

## 2021-12-09 RX ADMIN — PANTOPRAZOLE SODIUM 40 MG: 40 TABLET, DELAYED RELEASE ORAL at 17:10

## 2021-12-09 RX ADMIN — SULFAMETHOXAZOLE AND TRIMETHOPRIM 1 TABLET: 400; 80 TABLET ORAL at 09:18

## 2021-12-09 RX ADMIN — INSULIN ASPART 1 UNITS: 100 INJECTION, SOLUTION INTRAVENOUS; SUBCUTANEOUS at 17:10

## 2021-12-09 RX ADMIN — INSULIN ASPART 3 UNITS: 100 INJECTION, SOLUTION INTRAVENOUS; SUBCUTANEOUS at 03:01

## 2021-12-09 RX ADMIN — LEVALBUTEROL HYDROCHLORIDE 1.25 MG: 1.25 SOLUTION RESPIRATORY (INHALATION) at 20:25

## 2021-12-09 RX ADMIN — NYSTATIN 1000000 UNITS: 500000 SUSPENSION ORAL at 09:16

## 2021-12-09 RX ADMIN — CALCIUM CARBONATE 600 MG (1,500 MG)-VITAMIN D3 400 UNIT TABLET 1 TABLET: at 17:10

## 2021-12-09 ASSESSMENT — ACTIVITIES OF DAILY LIVING (ADL)
ADLS_ACUITY_SCORE: 16
ADLS_ACUITY_SCORE: 18
ADLS_ACUITY_SCORE: 16
ADLS_ACUITY_SCORE: 18
ADLS_ACUITY_SCORE: 16

## 2021-12-09 NOTE — PROGRESS NOTES
Care Coordinator  D/I: per update from Mallory Allen RN--sw: Rhoda tariq is aware that he is ready for ARU and per Leisa there is insurance issue so possibly resolved by tomorrow.  P: sw to follow for ARU.

## 2021-12-09 NOTE — PLAN OF CARE
Neuro: A&O x4. Sleep promoted. Slept throughout night.   Cardiac: SR/SB. VSS. Afebrile  Respiratory: Tolerating RA. LS clear.   GI/: Denied nausea, bowel sounds audible. No BM this shift. Void without difficulty; adequate uo.   Diet: TF cycled at 80mL/hr; to be clamped at 0800. Reg diet with supplemental drinks. BG checks q4h.   Skin: Clamshell and old CT sites scabbed.   Pain: denied  LDAs: L TL PICC with filters.  Electrolytes: Await lab results.  Mobility: Ambulated with 1 in room. No OOB activity this shift.      Plan:  Continue to monitor and follow POC.

## 2021-12-09 NOTE — INTERIM SUMMARY
"[facility] Pre-Admission Screen    Referral Source:  [facility] [address] [place] [address]  Admit date to referring facility: 9/5/2021    Physical Medicine and Rehab Consult Completed: Yes, Dr. 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 He transferred to [facility] on 9/5/21 for lung transplant workup.  He is now s/p bilateral lung transplantation on 10/16/21.  He has had a very prolonged hospitalization (over 3 months) w/ numerous complications including acute hypoxic respiratory failure w/ prolonged vent weaning requiring trach (now decannulated 12/8/21), bilateral pleural effusions, left lung cavitary lesion, candida empyema, HAP, and encephalopathy. Other significant complications include multifocal embolic CVA affecting B cerebral hemispheres and L cerebellum w/ resultant RUE weakness and B dysmetria, tremors attributed to tacrolimus, severe acute on chronic deconditioning, atrial fibriallation, CODE blue d/t asystole, and GIB s/p EGD. He is now medically stable and ready to discharge to acute inpatient rehab.  The patient requires an intensive inpatient rehab program to address the following acute impairments:impaired activity tolerance, impaired coordination, impaired judgement, impaired safety awareness, severe acute on chronic deconditioning, dyspnea on exertion, impaired balance, RUE weakness, bilateral dysmetria, and tremors. These impairments are contributing to " functional limitations specifically impacting his ability to safely and independently perform transfers, ambulate, climb stairs to enter his home, perform curb negotiation in the community, as well as complete ADLs and IADLs.     Current Active Medical Management Needs/Risks for Clinical Complications  The patient requires the high level of rehabilitation physician supervision that accompanies the provision of intensive rehabilitation therapy.  The patient needs the services of the rehabilitation physician to assess the patient medically and functionally and to modify the course of treatment as needed to maximize the patient's capacity to benefit from the rehabilitation process.  The patient requires medical mgmt and assessment of:    Pulmonary function in setting of B single lung transplantation, c/b acute hypoxic respiratory failure s/p tracheostomy (now decannulated 12/8/210), bilateral pleural effusions, left lung cavitary lesion, hospital acquired pneumonia, and invasive candidiasis: pt at high risk for respiratory decompensation and infection. Encourage pulmonary hygiene w/ chest physiotherapy BID; Levalbuterol and Mucomyst nebs TID (increased 12/8) d/t chest tightness, dyspnea and tenacious secretions. Pt needs close monitoring of respiratory status w/ activity in setting of dyspnea, tachypnea, labored breathing, and elevated HR response to activity. Pt also on Lasix for diuresis. Evaluated need for BiPAP after trach site is healed d/t history of SALTY pre-transplant. Pt requiring IV Ceftazdime Q8 and IV Fluconazole Q24 until 12/21.     Immunosuppression: Tacrolimus (goal 8-12) - recently titrated 12/11/21 d/t supratherapeutic level, MMF, Prednisone (next prednisone taper due 1/2/22); prophylaxis: Bactrim, Nystatin QID x6 months. Pt will require close monitoring of labs - including CMV, EBV, IgG on 12/14, CXR/CBC/BMP and Tac levels on Mon/Thursday; DSA ordered 12/12 and follow Q2 weeks (Mondays), monthly blood  Coccidioides Ag due 12/17.     Neurologic status in setting multifocal embolic CVA affecting B cerebral hemispheres and left cerebellum: assess for neurologic recovery; provide stroke education and risk factor reduction strategies (on Rosuvastatin for HLD).  Pt w/ new RUE weakness in setting of CVA - pt is right handed at baseline.  Pt also w/ encephalopathy (resolved) - on Seroquel and Melatonin.     Cardiac function in setting of Atrial Fibrillation and HTN: Metoprolol and Amiodarone discontinued d/t intermittent bradycardia. Amlodipine w/ goal SBP <140 mmHg. Will need close assessment of cardiac response to activity.     Anticoagulation in setting of embolic CVA, R subclavian DVT, and Afib: Warfarin. Pt at risk for further VTE and strokes.     Diabetes type 2 w/ steroid induced hyperglycemia: Lantus 30 mg BID, Novolog High sliding scale insulin; assess blood sugars Q4 as pt at risk for further steroid induced hyperglycemia in setting of Prednisone use.    Pain: Scheduled Tylenol, Flexeril. Optimize pain control for best functional outcomes and participation in rehab therapies.     Severe malnutrition: PEG J-tube cycled feeds  + on reg diet. On calorie counts. Needs close assessment of oral intake w/ small frequent meals encouraged utilizing oral supplements. Wean tube feeds as nutritional status allows.     Mental health in setting of anxiety, depression, and prolonged hospitalization (>3 months): Psychiatry involved, Lexaprol increased to 10mg daily and could be increase to 20 the week of 12/12/21; may benefit from Ritalin for motivation. PRN Hydroxyzine. Pt at risk for further sleep and mood disorders (depression) in setting of CVA. Pt may benefit from health psychology consult.    The patient is also at risk for falls w/ injury in setting of weakness, impaired activity tolerance, and impaired balance.     Past Medical/Surgical History  Surgery in the past 100 days: Yes  Additional relevant past medical  history: ILD, rheumatoid lung disease (baseline 4LPM O2 since 6/2021), RA, SALTY, hypothyroid, HTN,anxiety and depression, HLD, duodenal anomaly, hernia repair, R ACL, XR acromioclavicular joint bilateral.     Level of Functioning Prior to Admission:  LIVING ENVIRONMENT  People in home: significant other  Current Living Arrangements: house  Home Accessibility: stairs to enter home,stairs within home   Number of Stairs, Main Entrance: 2   Stair Railings, Main Entrance: none   Number of Stairs, Within Home, Primary: 12   Stair Railings, Within Home, Primary: railing on right side (ascending)  Transportation Anticipated: family or friend will provide  Living Environment Comments: Pt's bedroom is on upper level, reports having bathroom on main and upper level. Walk in shower, no GB present.     SELF-CARE  Usual Activity Tolerance: fair  Regular Exercise: No  Equipment Currently Used at Home: Uses home O2.  Activity/Exercise/Self-Care Comment: Pt reports SO does not work, able to assist PRN. Pt reports increasing SOB prior to admission. SO was helping with LB dressing and showering. Pt using O2 at baseline. Did not drive at baseline. He enjoys sports at baseline.    DISABILITY/FUNCTION  Concentrating, Remembering or Making Decisions Difficulty: no  Difficulty Communicating: no  Difficulty Eating/Swallowing: no  Walking or Climbing Stairs Difficulty: yes   Walking or Climbing Stairs: ambulation and stairs difficulty, requires equipment Mobility Management: O2 + rests  Dressing/Bathing Difficulty: yes   Dressing/Bathing: bathing difficulty, assistance 1 person   Dressing/Bathing Management: recently requiring A for LB dressing & showering PTA  Toileting issues: no  Doing Errands Independently Difficulty (such as shopping): yes   Errands Management: Peg was doing all errands  Fall history within last six months: no;    Change in Functional Status Since Onset of Current Illness/Injury: yes    Level of Function: GG Scale  (Section GG Functional Ability and Goals; CMS's LOPEZ Version 3.0 Manual effective 10.1.2019):  PT Current Function Goals for Rehab   Bed Rolling 4 Supervision or touching assitance 6 Independent   Supine to Sit 4 Supervision or touching assitance 6 Independent   Sit to Stand 3 Partial/moderate assistance 6 Independent   Transfer 3 Partial/moderate assistance 6 Independent   Ambulation 1 Dependent 6 Independent   Stairs Not completed 6 Independent     OT Current Function Goals for Rehab   Feeding 5 Setup or clean-up assistance 6 Independent   Grooming 4 Supervision or touching assitance 6 Independent   Bathing 3 Partial/moderate assistance 6 Independent   Upper Body Dressing 4 Supervision or touching assitance 6 Independent   Lower Body Dressing Not completed 6 Independent   Toileting 1 Dependent 6 Independent   Toilet Transfer 3 Partial/moderate assistance 6 Independent   Tub/Shower Transfer 3 Partial/moderate assistance 6 Independent   Cognition Not Assessed Independent     SLP Current Function Goals for Rehab   Swallow Not Impaired Not applicable   Communication Not Impaired Not applicable       Current Diet:  0-Thin, 7-Regular/easy to chew and Cycled tube feeds    Summary Statement:  The patient requires cues for safe energy conservation techniques and pacing strategies due to severe acute on chronic deconditioning in setting of prolonged hospitalization. He needs close monitoring of his cardiopulmonary response to activity d/t tachypnea, dyspnea on exertion, and elevated heart rate response w/ activity. He performs supine > sit transfers w/ HOB elevated, SBA w/ use of bed railing. He requires extended rest breaks d/e to MYERS w/ all activity. Pt performs STS transfers to WW w/ min A and cues for safe transfer technique in setting of sternal precautions, as well as cues for breathing management.  He ambulates w/ WW 20 ft + 10 ft + 15 ft x2 w/ WW and w/c follow. He demos significant dyspnea and needs cues for pacing,  posture, breathing mechanics. He is able to perform at most 2 minutes and 45 seconds of sustained aerobic exercise on Nustep at level 1 (no resistance) w/ OMNI rating of 7 and SOB scale rating of 4. Bret would benefit from ongoing rehab therapies to address his strength and coordination, specifically d/t RUE weakness in setting of R hand dominance. He demos deficits w/ RUE coordination, dexterity, strength, as well as tremors which impact his ability to safely and effectively complete ADLs.  He would benefit from cognitive assessment, as well as visual testing by OT in setting of CVA.  He performs seated g/h cares w/ setup A, standing g/h tasks w/ CGA w/ multiple seated rest breaks needed d/t dyspnea and fatigue. Pt requiring ~40 minutes total to take a shower including 10 minute rest after walking to bathroom/shower and 10 minutes rest after exiting shower, as pt w/ SOB and tachypnea.     Expected Therapies and Services Required During Inpatient Rehab Admission  Intensity of Therapy: Patient requires intensive therapies not available in a lesser level of care. Patient is motivated, making gains, and can tolerate 3 hours of therapy a day.  Physical Therapy: 90 minutes per day, 7 days a week for 18 days  Occupational Therapy: 90 minutes per day, 7 days a week for 18 days  Speech and Language Therapy: No SLP needs anticipated at this time.   Rehabilitation Nursing Needs: Patient requires 24 hour Rehab Nursing to manage wound care, vitals, medication education, positioning, carryover of new rehab techniques, care coordination, skin integrity, blood sugar management, diabetes education, assess neurologic status, pain management, stroke education, provide safe environment for patient at falls risk, monitor nutritional intake,  and reinforce energy conservation and pacing strategies in order to optimize pt's functional independence.    Precautions/restrictions/special needs:  Precautions: fall precautions and sternal/B  thoracotomy precautions  Restrictions: No lifting > 20# thru 12/16/21.  Special Needs: IVs, Transplant and private room d/t lung transplant   Designated Visitor: Peg (significant other)    Expected Level of Improvement: Pt will achieve a level of mod IND for transfers, gait, stairs, and ADLs in order to disch to local housing.   Expected Length of time to achieve: 18 days    Anticipated Discharge Needs:  Anticipated Discharge Destination: [facility]  Anticipated Discharge Support: significant other Peg  24/7 support available : Yes  Identified caregiver(s):  Peg  Anticipated Discharge Needs: Home with outpatient therapy, Outpatient Cardiac/Pulmonary Rehab, Follow-up with Transplant Coordinator and IV antibiotics     follow up with Dr. Abebe on 12/21/2021 @6:30 PM in a virtual visit (anticipate this appt will need to be rescheduled)    Identified challenges/barriers: none.    Rehab Admission Liaison Signature/Date/Time:    Physician statement of review and agreement:  I have reviewed and am in agreement of the need for IRF stay to address above functional and medical needs. In addition to above statements address, Patient requires intensive active and ongoing therapeutic intervention and multiple therapies; Patient requires medical supervision; Expected to actively participate in the intensive rehab program; Sufficiently stable to actively participate; Expectation for measurable improvement in functional capacity or adaption to impairments.    Physician Signature/Date/Time:

## 2021-12-09 NOTE — PROGRESS NOTES
[facility]    Medicine Progress Note - Hospitalist Service       Date of Admission:  9/5/2021    Assessment & Plan           Edson Thornton is a 55 yo M with PMHx of NSIP/ILD 2/2 Rheumatoid lung disease, RA, bronchiectasis, moderately pulm HTN, SALTY, HTN, HLD, PLD, hypogammaglobinemia, duodenal anomaly, anxiety, and depression s/p bilateral lung transplant on 10/16/2021, with post operative course complicated by prolonged vent wean s/p trach and PEG/J tube placement with thoracic surgery on 10/29. Post-op course complicated encephalopathy, and diffuse weakness, acute to subacute by CVA, afib with RVR, BRENNAN, candidemia/candida empyema, and Code Blue due to bradycardia/asystole and hypotension for significant GIB s/p EGD with adherent clot near PEG tube site s/p clips. Transferred from ICU to medicine on 11/23/2021.  Likely to be decannulated 12/8.  Ready for discharge to ARU.     ILD and NSIP s/p BSLT on 10/16/2021  Acute hypoxic respiratory failure s/p tracheostomy on 10/29/21 and decannulation on 12/8/2021  Bilateral pleural effusions   - Transplant pulmonology following, appreciate recs   - IS: s/p basiliximab on 10/16 and 10/20. Continue tacrolimus, MMF, prednisone per transplant pulmonary dosing  - Ppx: Continue bactrim 400-80 mg daily, nystatin QID x6 months  - Monthly Coccidioides no longer suggested by ID  - DSA every 2 weeks   - Levalbuterol and mucomyst QID and pulm toilet and chest PT QID  - PT/OT/SLP   - Diuresis at 40 mg lasix daily   - Oxycodone 5-10 mg Q4H  - Schedule tylenol     Left lung cavitary lesion   Suspect aspiration vs pseudomonal vs K pneumonia induced abscess. CT on 10/25 with LLL nodular opacity. Repeat CT chest on 11/22 with new cavitary lesion in the left lung base, and with multifocal bilateral upper lobe GGO (some of which are new), new 1.8 cm fluid collection with surrounding fat stranding in the left axilla, decreased bilateral loculated  pleural effusions. Indeterminate Quant Gold, but negative tuberculin skin tests on mulitple occurences. S/p BAL on 11/22. ID feels less likely fungal. B D glucan positive but has known candidemia. Cocci ag in urine negatie, serum histo negative, CRAG negative  - Transplant ID following   - ID recommends stopping zosyn (11/15-11/23), start Ceftazdime 2 grams Q8H (until 12/21) and levaquin 750 mg daily (until 12/07)  - Follow up CT chest in 4 weeks 12/21  - Please include in the patient's discharge instructions, follow up with Dr. Abebe on 12/21/2021 @6:30 PM in a virtual visit.      Klebsiella pneumoniae and Pseudomonas fluorescens/putida HAP  Klebsiella initially noted trach sputum culture 11/10. Bronch culture 11/12 with Klebsiella and Pseudomonas fluorescens/putida (R-meropenem).     - Abx as above      Invasive candidiasis with Candida albicans  Positive candida BCx 10/20 and 10/22.  BDG fungitell positive (399) on 10/20. Sputum Cx + Candida albicans persistently.  TC 10/23 without evidence of endocarditis. Ophthalmology consult 10/24 with benign dilated fundoscopic exam.  Candida empyema also noted 10/25, chest tubes inadvertently removed by CVTS 10/28, left chest tube replaced by IR 11/3 as above with ongoing Candida on cultures. Repeat pleural fungal cultures and fungal/bacterial blood cultures on 11/18 NGTD. Transplant as noted above following   -Left chest tube removed 12/1  - Initially on Micafungin (10/22-10/27) transition to fluconazole 400 mg IV daily (start date 10/26 end date 12/21)     Hypogammaglobinemia  Received IVIG with plan to repeat IgG on 12/15.    Encephalopathy, resolved  Likely multi-factorial in the setting of stroke, prolonged critical illness.  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 MRI brain on 10/23 with multifocal subacute infarct  within both cerebral hemispheres and left cerebellum. Presumed embolic with afib hx.   - Anticoagulation as noted below      Atrial fibrillation  [id number] for HTN, CVA, and DM2. First noted on 10/18, started on amiodarone drip, and converted to NSR. Metoprolol and amidoarone discontinued on 11/20 due to intermittent bradycardia.   - Anticoagulation with warfarin as noted below  - Continue Rosuvastatin 10 mg daily      Right subclavian DVT   dx on 11/4. Resumed on heparin drip on 11/5 and transitioned to warfarin in setting of thrombocytopenia. HIT panel negative on 11/21.   - Continue warfarin per pharmacy protocol     DM2 with steroid induced hyperglycemia   Hemoglobin A1c 6.6 pre-operatively. Endocrine recently consulted for steroids induced hyperglycemia.   - Continue Lantus 30 mg BID  - Novolog High Sliding scale insulin Q4H  - BG Q4H      Diarrhea   C. Diff negative on 11/20. Rectal tube in place on 11/22. Possibly due to tube feeds or magnesium supplementation.   - Monitor I&O      Severe malnutrition in the context of acute on chronic illness  Patient currently has a PEG J-tube.  -Multivitamin, folic acid, B6, B12 supplements   - TF per nutrition; continue oral diet as well  -Hopefully over the next couple of weeks we can decrease tube feeds and allow the patient to eat more food by mouth     Anxiety and depression   Psychiatry reconsulted and after discussion suggest increasing lexapro. Will discuss with patient regarding starting ritalin in the AM for motivation.  - Lexapro increased to 10 mg daily could increase to 20 in a week ~12/12  -As needed hydroxyzine    Concern for chipped tooth  Poor dental hygiene  Patient noted he feels like he may have chipped his tooth or has a loose tooth after eating guevara. It is not painful.   - Dental hygiene consult placed and chipped tooth stable rec oral hygiene cares     ------ Chronic stable medical problems ------     RA- Dx 5/2021 with + CCP ab and RF.  Previously treated with rituximab. Rheum consulted early in admission. On steroids as noted above.   SALTY - Uses CPAP at bedtime prior to admission. Will need to evaluate for BiPAP after decannulation   HTN- Continue PTA amlodipine 5 mg daily. PRN labetalol and hydralazine for goal SBP <140   Hypothyroidism - TSH 3.17 on 11/19/21. Continue PTA levothyroxine 25 mcg daily      ------ Resolved Hospital problems -------     Recurrent GIB - hgb drop on 10/22 s/p 2 unit pRBC transfusion with EGD on 10/23 with NJ/OG tube trauma with scant oozing. Patient then developed progressive hypotension and ultimately CODE BLUE for GIB in setting of anticoagulation with heparin drip, s/p MTP. EGD on 11/2 with large amount of clotted blood in stomach and area of raised mucosa with small adherent clot near PEG tube site that was clipped, no active bleeding.  Maroon stools 11/3, repeat EGD with extensive old blood in stomach, no active bleeding, small nodular area with prior clips clipped again.  Most recent EGD 11/5 with ulcer noted at PEG tube bumper site, gastritis, and suction marks from G tube. TF noted in G tube drainage bag 11/7, AXR with GJ tube tip projecting over proximal duodenum. GJ tube exchanged 11/9 by GI.  - PO PPI BID      HSV  Chronic intermittent active infection pre-transplant with recent HSV infection: crusted lesions throughout left side of jaw, s/p 10 day treatment course of ACV through 10/9.  HSV PCR blood negative 10/17.  S/p ACV ppx as above (started POD #1 instead of POD #8 given HSV history and location).     Hypernatremia, resolved  Due to inability to have oral intake. Resolved now that he is PO and able to access water.       Diet: Regular Diet Adult  Adult Formula Drip Feeding: Continuous Vital 1.5; Jejunostomy; Goal Rate: 80; mL/hr; From: 4:00 PM; 8:00 AM; Medication - Feeding Tube Flush Frequency: See free water flush order. At least 15-30 mL water before and after medication administration  ...  Snacks/Supplements Adult: Ensure Clear; Between Meals    DVT Prophylaxis: Heparin SQ  Watson Catheter: Not present  Central Lines: None  Code Status: Full Code      Disposition Plan   Expected discharge: medically ready to discharge to TCU recommended to transitional care unit once safe disposition plan/ TCU bed available.     The patient's care was discussed with the Patient and Pulm Consultant.    Gume Baldwin MD  Hospitalist Service  [facility]  Securely message with the Vocera Web Console (learn more here)  Text page via [employer] Paging/Directory    Please see sign in/sign out for up to date coverage information    Clinically Significant Risk Factors Present on Admission                ______________________________________________________________________    Interval History   Decannulated yesterday.  Feeling well this morning.  Fiancé present at bedside.  Ambulating in the room but feels tired.    Four point ROS completed and negative unless listed above    Data reviewed today: I reviewed all medications, new labs and imaging results over the last 24 hours.    Physical Exam   Vital Signs: Temp: 98  F (36.7  C) Temp src: Oral BP: 124/84 Pulse: 87   Resp: 18 SpO2: 94 % O2 Device: None (Room air)    Weight: 146 lbs 0 oz  General Appearance: Patient is in no acute distress.  He appears comfortable sitting in bed.  He appears stated age.  Respiratory: Normal work of breathing.  Decannulated tracheostomy.  Cardiovascular: RRR S1/S2,  GI: soft, nontender, nondistended  Skin: no jaundice  Neuro: Alert and oriented to person, place, time, situation.  Speech is normal.  Moves all extremities symmetrically and equally.    Data   Recent Labs   Lab 12/09/21  1227 12/09/21  0641 12/09/21  0252 12/08/21  0921 12/08/21  0604 12/07/21  0833 12/07/21  0605   WBC  --  9.2  --   --  8.5  --  8.7   HGB  --  9.5*  --   --  8.8*  --  9.0*   MCV  --  93  --   --  94  --  96   PLT  --   142*  --   --  118*  --  128*   INR  --  2.18*  --   --  2.52*  --  2.51*   NA  --  138  --   --  139  --  139   POTASSIUM  --  4.7  --   --  4.7  --  4.8   CHLORIDE  --  105  --   --  106  --  107   CO2  --  28  --   --  27  --  25   BUN  --  28  --   --  35*  --  31*   CR  --  0.64*  --   --  0.70  --  0.68   ANIONGAP  --  5  --   --  6  --  7   ERIK  --  9.1  --   --  8.9  --  8.8   * 153* 205*   < > 177*   < > 174*   ALBUMIN  --   --   --   --  2.3*  --  2.3*   PROTTOTAL  --   --   --   --  5.7*  --  5.8*   BILITOTAL  --   --   --   --  0.2  --  <0.1*   ALKPHOS  --   --   --   --  107  --  106   ALT  --   --   --   --  25  --  27   AST  --   --   --   --  17  --  22    < > = values in this interval not displayed.     No results found for this or any previous visit (from the past 24 hour(s)).  Medications     dextrose Stopped (10/24/21 1008)     Warfarin Therapy Reminder         acetaminophen  975 mg Oral or Feeding Tube Q8H     acetylcysteine  2 mL Nebulization TID     amLODIPine  5 mg Oral Daily     calcium carbonate 600 mg-vitamin D 400 units  1 tablet Oral or Feeding Tube BID w/meals     cefTAZidime  2 g Intravenous Q8H     cyclobenzaprine  10 mg Oral At Bedtime     escitalopram  10 mg Oral or Feeding Tube Daily     fiber modular (NUTRISOURCE FIBER)  1 packet Per Feeding Tube BID     fluconazole  400 mg Intravenous Q24H     fluticasone  1 spray Both Nostrils Daily     folic acid-vit B6-vit B12  1 tablet Oral Daily     furosemide  40 mg Oral Daily     heparin lock flush  5-20 mL Intracatheter Q24H     insulin aspart  1-12 Units Subcutaneous Q4H     insulin glargine  30 Units Subcutaneous BID     levalbuterol  1.25 mg Nebulization TID     levothyroxine  25 mcg Oral Daily     lidocaine  2 patch Transdermal Q24H     lidocaine   Transdermal Q8H     magnesium oxide  800 mg Oral QAM    And     magnesium oxide  400 mg Oral QPM     melatonin  10 mg Oral QPM     menthol   Transdermal Q8H     multivitamin  w/minerals  1 tablet Oral Daily     mycophenolate  1,000 mg Oral BID     nystatin  1,000,000 Units Swish & Swallow 4x Daily     pantoprazole  40 mg Oral BID AC     predniSONE  10 mg Oral or Feeding Tube BID     QUEtiapine  25 mg Oral or Feeding Tube BID     QUEtiapine  50 mg Oral At Bedtime     rosuvastatin  10 mg Oral or Feeding Tube Daily     sodium chloride (PF)  10-40 mL Intracatheter Q8H     sodium chloride (PF)  3 mL Intracatheter Q8H     sulfamethoxazole-trimethoprim  1 tablet Oral or Feeding Tube Daily     tacrolimus  2.5 mg Oral QAM     tacrolimus  2.5 mg Oral QPM     warfarin ANTICOAGULANT  2 mg Oral ONCE at 18:00

## 2021-12-09 NOTE — PROGRESS NOTES
Pulmonary Medicine  Cystic Fibrosis - Lung Transplant Team  Progress Note  2021       Patient: Edson Thornton  MRN: [id number]  : 1965 (age 56 year old)  Transplant: 10/16/2021 (Lung), POD#54  Admission date: 2021    Assessment & Plan:     Edson Thornton is a 56 year old male with a PMH significant for NSIP/ILD, bronchiectasis, moderate PH, RA, SALTY, chronic HSV infection, hypogammaglobulinemia, steroid-induced diabetes, hypothyroidism, PFO, HTN, HLD, duodenal anomaly, anxiety, and depression.  Admitted on 21 from OSH for acute on chronic respiratory failure 2/2 ILD exacerbation, now s/p BSLT on 10/16/21.  Prolonged vent wean s/p trach (10/29-) and PEG/J tube (10/29).  Post-op course also complicated by encephalopathy and diffuse weakness, acute to subacute CVA, afib with RVR, BRENNAN, GI bleed, Candidemia/Candida empyema, and anxiety.  Code called  for bradycardia/asystole, progressive hypotensive, found to have significant GI bleed, EGD with adherent clot near PEG tube site that was clipped.  Ongoing improvement in weakness, pain, and respiratory status.  Discharge to ARU pending insurance approval and bed availability     Today's recommendations:  - Tacro level  (ordered)  - Next prednisone taper due   - CMV and EBV ordered   - Repeat DSA ordered   - Following recent bacterial and fungal sputum cultures  - Monthly blood Coccidioides Ag due  (ordered)  - Continue ceftaz and fluconazole courses through   - Repeat IgG ordered 12/15  - Evaluate need for BiPAP after trach site has healed     S/p BSLT for ILD:  Acute hypoxic respiratory failure, Resolved:   Prolonged vent weaning s/p trach, Resolved:  Bilateral pleural effusions: Explant pathology with NSIP, no malignancy.  Prolonged vent wean s/p trach and PEG/J tube placement with thoracic surgery 10/29.  Code called 11/2 for bradycardia/asystole (required 1 round of CPR, no medications) then progressively  hypotensive, GI bleed as below.  S/p left chest tube placement (11/3-11/30) for pleural effusion/empyem s/p lytics 11/25-11/28 (CXR showing trapped left lung), right thoracentesis 11/27 (cultures negative).  Chest CT (11/22) with new LLL cavitary lesion with multifocal GGO in BUL, new 1.8 cm fluid collection with surrounding fat stranding in the left axilla, decreased bilateral loculated pleural effusions, and similar small pericardial effusion.  Hypoxia resolved 12/1.  CXR (12/7) with stable effusions and RLL opacity.  Trach decannulated 12/8.   and trach has remained capped exclusively since 11/30, tolerating well.    - Nebs: levalbuterol and Mucomyst TID (increased 12/8) d/t chest tightness and dyspnea with tenacious secretions  - Pulmonary toilet with chest physiotherapy BID, continue for now  - Volume management per primary team  - Repeat CXR 12/10 (ordered)     Immunosuppression: s/p induction therapy with basiliximab 10/16 (and high dose IV steroid) and 10/20  - Tacrolimus 2.5 mg qBID (increased 12/6).  Goal level 8-12. Repeat level 12/11 (ordered).  - MMF 1000 mg BID (decreased 11/2 given GI bleed, AZA to be avoided given TPMT)  - Prednisolone 10 mg BID, next taper due 1/2/22 (not yet ordered)  Date AM dose (mg) PM dose (mg)   12/5/21 10 10   1/2/22 10 7.5   1/30/22 7.5 7.5   2/27/22 7.5 5   3/27/22 5 5   4/24/22 5 2.5      Prophylaxis:   - Bactrim for PJP ppx (held 11/2-11/5 d/t BRENNAN)  - Nystatin for oral candidiasis ppx, 6 month course  - See below for serologies and viral ppx:    Donor Recipient Intervention   CMV status Negative Negative None, CMV monthly (ordered 12/14)   EBV status Positive Positive None, EBV monthly (ordered 12/14)   HSV status N/A Positive S/p acyclovir POD #1-30 (recent infection history pre-txp)      Positive cross match:   Positive DSA: Note that he received two doses of rituximab in June, which is likely contributing to cross match result.  DSA newly positive 11/29 with DQB5 (mfi  2522), decreased with f/u on 12/6  - Repeat DSA ordered 12/13     ID: Concern for possible Strongyloides exposure pre-transplant s/p ivermectin x1 dose (9/17).  Donor and recipient cultures NGTD.  S/p IV ceftazidime/vancomycin for 48h per protocol and additional empiric ceftazidime 10/19-10/23 given recurrent fevers as below.  Cryptococcal Ag negative 10/29.  Reports some blood mixed in sputum 12/3, persisting intermittently.   - Bacterial (12/5) and fungal (12/4) sputum cultures NGTD  - Monthly blood Coccidioides Ag x12 months post-transplant per ID (urine and serum negative 11/17), due 12/17 (ordered)     Klebsiella pneumoniae:  Pseudomonas fluorescens/putida:   LLL cavity: Klebsiella initially noted trach sputum culture 11/10.  Started on ceftriaxone 11/11, transitioned to ertapenem 11/12-11/13, and back to ceftriaxone 11/14.  Bronch culture 11/12 with Klebsiella and Pseudomonas fluorescens/putida (R-meropenem).  Chest CT 11/22 with LLL cavity as above, BAL with Klebsiella pneumoniae.  Histo Ag 11/22, 11/23 and Blast Ag 11/22 negative.  - ABX: ceftazidime (11/23-12/21) per transplant ID; s/p Zosyn (11/15-11/23) and levofloxacin (11/23-12/7)  - Transplant ID follow up scheduled 12/21 with repeat chest CT prior      Disseminated Candida: Noted on blood cultures 10/20 and 10/22.  BDG fungitell positive (399) on 10/20.  Respiratory cultures with persistent Candida albicans.  TC 10/23 without evidence of endocarditis.  Ophthalmology consult 10/24 with benign dilated fundoscopic exam.  Candida empyema also noted 10/25, chest tubes inadvertently removed by CVTS 10/28, left chest tube replaced by IR 11/3 as above with ongoing Candida on cultures (11/3, 11/18).  BDG fungitell positive (>500) and Aspergillus galactomannan negative 11/22.  - Fluconazole (10/26-12/21) per transplant ID (ETG 12/7 stable)     HSV: Chronic intermittent active infection pre-transplant with recent HSV infection: crusted lesions throughout left  side of jaw, s/p 10 day treatment course of ACV through 10/9.  HSV PCR blood negative 10/17.  S/p ACV ppx as above (started POD #1 instead of POD #8 given HSV history and location).     Hypogammaglobulinemia: IgG previously low at 364 (9/7).  Noted at 265 at time of transplant, s/p IVIG 10/21, repeat IgG (11/17) 198, s/p IVIG (with premedication) 11/18.  - Repeat IgG (12/15, ordered)      SALTY: Noted pre-transplant.  Home CPAP 6-12 cm H2O.  - Evaluate need for BiPAP after trach site has healed     Other relevant problems being managed by primary team:     Acute to subacute embolic CVA:   Encephalopathy and diffuse weakness, Improving: Stroke code 10/22 d/t limited movement of BLE, CT head with infarcts in bilateral cerebral hemispheres and left cerebella hemisphere (presumed embolic), no acute intracranial hemorrhage.  MRI 10/23 with multifocal subacute infarcts within both cerebral hemispheres and left cerebellum.  DDx include surgery v embolic v infectious.  Heparin drip started 10/23 (intermittently held with GI bleed as below).  Repeat stroke code 10/25 d/t marked decrease in responsiveness with sedation wean, pupil inequality, and absent gag reflex.  CTA head without obvious new pathology, MRI brain (with and without contrast) primarily revealing for infarct, low likelihood of PRES.  Ammonia normal.  VEEG per neuro with severe diffuse encephalopathy.  Ongoing improvement since 10/29, repeat head CT 11/2 (following code) without new acute intracranial abnormalities.  - AC management per primary team  - PT/OT      Afib with RVR: Noted 10/18, started on amiodarone drip and converted to SR, transitioned to PO 10/21, decreased 10/29.  Metoprolol and amiodarone discontinued 11/19 d/t intermittent bradycardia.  AC per primary team.      Right subclavian DVT: Seen on UE US from 11/4.  Heparin drip resumed 11/5, transitioned to warfarin 11/20 and in the setting of thrombocytopenia.  HIT negative  "11/21.     Hypomagnesemia: Suppressed reabsorption 2/2 CNI.  Requiring intermittent IV and PO replacement.  - Mag-Ox 800 mg qAM / 400 mg qPM (increased 12/8)     Mild thrombocytopenia: Improving but not back to baseline, stable in 110's.     We appreciate the excellent care provided by the [place] [id number] team.  Recommendations communicated via telephone and this note.  Will continue to follow along closely, please do not hesitate to call with any questions or concerns.     Patient discussed with Dr. Mckeon.    Renetta Holloway, DNP, APRN, CNP  Inpatient Nurse Practitioner  Pulmonary CF/Transplant     Subjective & Interval History:     No acute events.  Remains on RA, no issues after trach decannulation yesterday.  Tolerating cycle tube feeds, loose stools stable at 1-2 yesterday.    Review of Systems:     C: No fever, no chills, no change in weight, no change in appetite  INTEGUMENTARY/SKIN: No rash or obvious new lesions  ENT/MOUTH: No sore throat, no sinus pain, + nasal congestion and drainage  RESP: See interval history  CV: No chest pain, no palpitations, no peripheral edema, no orthopnea  GI: No nausea, no vomiting, no reflux symptoms  : No dysuria  MUSCULOSKELETAL: No myalgias, no arthralgias  ENDOCRINE: Blood sugars intermittently elevated  NEURO: + occ headache, no new numbness or tingling  PSYCHIATRIC: See interval history    Physical Exam:     Vital signs:  Temp: 97.6  F (36.4  C) Temp src: Oral BP: 127/79 Pulse: 101   Resp: 18 SpO2: 94 % O2 Device: None (Room air) Oxygen Delivery: 2 LPM Height: 162.6 cm (5' 4.02\") Weight: 66.2 kg (146 lb)  I/O:     Intake/Output Summary (Last 24 hours) at 12/9/2021 0832  Last data filed at 12/9/2021 0632  Gross per 24 hour   Intake 2120 ml   Output 2075 ml   Net 45 ml     Constitutional: Lying in bed in no apparent distress.   HEENT: Eyes with pink conjunctivae, anicteric.  Oral mucosa moist without lesions.  Trach stoma site cdi.  PULM: Diminished bases bilaterally.  No " crackles, no rhonchi, no wheezes.  Non-labored breathing on RA.  CV: Normal S1 and S2.  RRR.  No murmur, gallop, or rub.  No peripheral edema.   ABD: NABS, soft, nontender, nondistended.  PEG/J site not visualized.  MSK: Moves all extremities.   NEURO: Sleeping but easily arousable, conversant.   SKIN: Warm, dry.  No rash on limited exam. Lesion to right lower lip.  PSYCH: Flat affect,calm.    Lines, Drains, and Devices:  PICC Triple Lumen 11/04/21 Left Basilic Access. PICC okay to use. (Active)   Site Assessment WDL 12/08/21 2259   External Cath Length (cm) 1 cm 12/05/21 1027   Extremity Circumference (cm) 28 cm 11/28/21 1038   Dressing Intervention Chlorhexidine patch;Transparent 12/08/21 2259   Dressing Change Due 12/12/21 12/08/21 2259   PICC Comment site check, CDI, SL, ecchymotic 12/08/21 1023   [place] - Status saline locked 12/08/21 2259   [place] - Cap Change Due 12/10/21 12/08/21 2259   Red - Status saline locked 12/08/21 2259   Red - Cap Change Due 12/10/21 12/08/21 2259   White - Status saline locked 12/08/21 2259   White - Cap Change Due 12/10/21 12/08/21 2259   Extravasation? No 12/08/21 2259   Line Necessity Yes, meets criteria 12/08/21 2259   Number of days: 35     Data:     LABS    CMP:   Recent Labs   Lab 12/09/21  0641 12/09/21  0252 12/08/21  2316 12/08/21  2004 12/08/21  0921 12/08/21  0604 12/07/21  0833 12/07/21  0605 12/06/21  0839 12/06/21  0554 12/05/21  0739 12/05/21  0637     --   --   --   --  139  --  139  --  141  --  138   POTASSIUM 4.7  --   --   --   --  4.7  --  4.8  --  4.1  --  4.5   CHLORIDE 105  --   --   --   --  106  --  107  --  106  --  105   CO2 28  --   --   --   --  27  --  25  --  26  --  27   ANIONGAP 5  --   --   --   --  6  --  7  --  9  --  6   * 205* 164* 126*   < > 177*   < > 174*   < > 199*   < > 197*   BUN 28  --   --   --   --  35*  --  31*  --  23  --  23   CR 0.64*  --   --   --   --  0.70  --  0.68  --  0.65*  --  0.66   GFRESTIMATED >90  --   --   --    --  >90  --  >90  --  >90  --  >90   ERIK 9.1  --   --   --   --  8.9  --  8.8  --  9.1  --  9.1   MAG  --   --   --   --   --  1.7  --  1.9  --  1.4*  --  1.7   PHOS  --   --   --   --   --  2.9  --  3.4  --  3.1  --  3.2   PROTTOTAL  --   --   --   --   --  5.7*  --  5.8*  --  6.1*  --  5.9*   ALBUMIN  --   --   --   --   --  2.3*  --  2.3*  --  2.4*  --  2.3*   BILITOTAL  --   --   --   --   --  0.2  --  <0.1*  --  0.2  --  0.2   ALKPHOS  --   --   --   --   --  107  --  106  --  114  --  109   AST  --   --   --   --   --  17  --  22  --  26  --  21   ALT  --   --   --   --   --  25  --  27  --  32  --  27    < > = values in this interval not displayed.     CBC:   Recent Labs   Lab 12/09/21 0641 12/08/21 0604 12/07/21 0605 12/06/21  0554   WBC 9.2 8.5 8.7 8.2   RBC 3.15* 2.96* 3.01* 3.19*   HGB 9.5* 8.8* 9.0* 9.6*   HCT 29.3* 27.8* 29.0* 30.4*   MCV 93 94 96 95   MCH 30.2 29.7 29.9 30.1   MCHC 32.4 31.7 31.0* 31.6   RDW 14.6 14.5 14.6 14.6   * 118* 128* 113*       INR:   Recent Labs   Lab 12/09/21 0641 12/08/21 0604 12/07/21 0605 12/06/21  0554   INR 2.18* 2.52* 2.51* 2.35*       Glucose:   Recent Labs   Lab 12/09/21 0641 12/09/21  0252 12/08/21  2316 12/08/21  2004 12/08/21  1612 12/08/21  1340   * 205* 164* 126* 107* 147*       Blood Gas: No lab results found in last 7 days.    Culture Data No results for input(s): CULT in the last 168 hours.    Virology Data:   Lab Results   Component Value Date    FLUAH1 Negative 11/22/2021    FLUAH3 Negative 11/22/2021    HL8252 Negative 11/22/2021    IFLUB Negative 11/22/2021    RSVA Negative 11/22/2021    RSVB Negative 11/22/2021    PIV1 Negative 11/22/2021    PIV2 Negative 11/22/2021    PIV3 Negative 11/22/2021    HMPV Negative 11/22/2021    HRVS Negative 11/22/2021    ADVBE Negative 11/22/2021    ADVC Negative 11/22/2021    ADVC Negative 11/12/2021    ADVC Negative 10/17/2021       Historical CMV results (last 3 of prior testing):  Lab Results    Component Value Date    CMVQNT Not Detected 11/22/2021    CMVQNT Not Detected 11/16/2021    CMVQNT Not Detected 11/12/2021     No results found for: CMVLOG    Urine Studies    Recent Labs   Lab Test 11/01/21  1336 10/25/21  1507   URINEPH 5.5 5.5   NITRITE Negative Negative   LEUKEST Negative Negative   WBCU 0 2       Most Recent [place] Pulmonary Function Testing (FVC/FEV1 only)  No results found for: 20002  No results found for: 20003  No results found for: 20015  No results found for: 20016    IMAGING    No results found for this or any previous visit (from the past 48 hour(s)).

## 2021-12-10 ENCOUNTER — APPOINTMENT (OUTPATIENT)
Dept: PHYSICAL THERAPY | Facility: CLINIC | Age: 56
End: 2021-12-10
Attending: STUDENT IN AN ORGANIZED HEALTH CARE EDUCATION/TRAINING PROGRAM
Payer: COMMERCIAL

## 2021-12-10 ENCOUNTER — APPOINTMENT (OUTPATIENT)
Dept: GENERAL RADIOLOGY | Facility: CLINIC | Age: 56
End: 2021-12-10
Attending: NURSE PRACTITIONER
Payer: COMMERCIAL

## 2021-12-10 ENCOUNTER — APPOINTMENT (OUTPATIENT)
Dept: OCCUPATIONAL THERAPY | Facility: CLINIC | Age: 56
End: 2021-12-10
Attending: STUDENT IN AN ORGANIZED HEALTH CARE EDUCATION/TRAINING PROGRAM
Payer: COMMERCIAL

## 2021-12-10 LAB
ALBUMIN SERPL-MCNC: 2.5 G/DL (ref 3.4–5)
ALP SERPL-CCNC: 105 U/L (ref 40–150)
ALT SERPL W P-5'-P-CCNC: 24 U/L (ref 0–70)
ANION GAP SERPL CALCULATED.3IONS-SCNC: 8 MMOL/L (ref 3–14)
AST SERPL W P-5'-P-CCNC: 19 U/L (ref 0–45)
BACTERIA BRONCH: NO GROWTH
BILIRUB SERPL-MCNC: 0.5 MG/DL (ref 0.2–1.3)
BUN SERPL-MCNC: 30 MG/DL (ref 7–30)
CALCIUM SERPL-MCNC: 9 MG/DL (ref 8.5–10.1)
CHLORIDE BLD-SCNC: 103 MMOL/L (ref 94–109)
CO2 SERPL-SCNC: 27 MMOL/L (ref 20–32)
CREAT SERPL-MCNC: 0.69 MG/DL (ref 0.66–1.25)
ERYTHROCYTE [DISTWIDTH] IN BLOOD BY AUTOMATED COUNT: 14.3 % (ref 10–15)
GFR SERPL CREATININE-BSD FRML MDRD: >90 ML/MIN/1.73M2
GLUCOSE BLD-MCNC: 203 MG/DL (ref 70–99)
GLUCOSE BLDC GLUCOMTR-MCNC: 110 MG/DL (ref 70–99)
GLUCOSE BLDC GLUCOMTR-MCNC: 136 MG/DL (ref 70–99)
GLUCOSE BLDC GLUCOMTR-MCNC: 172 MG/DL (ref 70–99)
GLUCOSE BLDC GLUCOMTR-MCNC: 195 MG/DL (ref 70–99)
GLUCOSE BLDC GLUCOMTR-MCNC: 208 MG/DL (ref 70–99)
HCT VFR BLD AUTO: 29.2 % (ref 40–53)
HGB BLD-MCNC: 9.3 G/DL (ref 13.3–17.7)
INR PPP: 2.18 (ref 0.85–1.15)
MAGNESIUM SERPL-MCNC: 1.6 MG/DL (ref 1.6–2.3)
MCH RBC QN AUTO: 29.5 PG (ref 26.5–33)
MCHC RBC AUTO-ENTMCNC: 31.8 G/DL (ref 31.5–36.5)
MCV RBC AUTO: 93 FL (ref 78–100)
PHOSPHATE SERPL-MCNC: 3.6 MG/DL (ref 2.5–4.5)
PLATELET # BLD AUTO: 141 10E3/UL (ref 150–450)
POTASSIUM BLD-SCNC: 4.7 MMOL/L (ref 3.4–5.3)
PROT SERPL-MCNC: 5.9 G/DL (ref 6.8–8.8)
RBC # BLD AUTO: 3.15 10E6/UL (ref 4.4–5.9)
SODIUM SERPL-SCNC: 138 MMOL/L (ref 133–144)
WBC # BLD AUTO: 9 10E3/UL (ref 4–11)

## 2021-12-10 PROCEDURE — 99233 SBSQ HOSP IP/OBS HIGH 50: CPT | Mod: 24 | Performed by: INTERNAL MEDICINE

## 2021-12-10 PROCEDURE — 250N000012 HC RX MED GY IP 250 OP 636 PS 637: Performed by: PHYSICIAN ASSISTANT

## 2021-12-10 PROCEDURE — 99232 SBSQ HOSP IP/OBS MODERATE 35: CPT | Performed by: STUDENT IN AN ORGANIZED HEALTH CARE EDUCATION/TRAINING PROGRAM

## 2021-12-10 PROCEDURE — 250N000012 HC RX MED GY IP 250 OP 636 PS 637: Performed by: INTERNAL MEDICINE

## 2021-12-10 PROCEDURE — 250N000012 HC RX MED GY IP 250 OP 636 PS 637: Performed by: STUDENT IN AN ORGANIZED HEALTH CARE EDUCATION/TRAINING PROGRAM

## 2021-12-10 PROCEDURE — 94640 AIRWAY INHALATION TREATMENT: CPT

## 2021-12-10 PROCEDURE — 999N000157 HC STATISTIC RCP TIME EA 10 MIN

## 2021-12-10 PROCEDURE — 250N000013 HC RX MED GY IP 250 OP 250 PS 637: Performed by: STUDENT IN AN ORGANIZED HEALTH CARE EDUCATION/TRAINING PROGRAM

## 2021-12-10 PROCEDURE — 97535 SELF CARE MNGMENT TRAINING: CPT | Mod: GO

## 2021-12-10 PROCEDURE — 250N000009 HC RX 250: Performed by: NURSE PRACTITIONER

## 2021-12-10 PROCEDURE — 94640 AIRWAY INHALATION TREATMENT: CPT | Mod: 76

## 2021-12-10 PROCEDURE — 250N000013 HC RX MED GY IP 250 OP 250 PS 637: Performed by: ANESTHESIOLOGY

## 2021-12-10 PROCEDURE — 250N000011 HC RX IP 250 OP 636: Performed by: STUDENT IN AN ORGANIZED HEALTH CARE EDUCATION/TRAINING PROGRAM

## 2021-12-10 PROCEDURE — 214N000001 HC R&B CCU UMMC

## 2021-12-10 PROCEDURE — 83735 ASSAY OF MAGNESIUM: CPT | Performed by: STUDENT IN AN ORGANIZED HEALTH CARE EDUCATION/TRAINING PROGRAM

## 2021-12-10 PROCEDURE — 94668 MNPJ CHEST WALL SBSQ: CPT

## 2021-12-10 PROCEDURE — 250N000013 HC RX MED GY IP 250 OP 250 PS 637: Performed by: THORACIC SURGERY (CARDIOTHORACIC VASCULAR SURGERY)

## 2021-12-10 PROCEDURE — 97530 THERAPEUTIC ACTIVITIES: CPT | Mod: GO

## 2021-12-10 PROCEDURE — 71046 X-RAY EXAM CHEST 2 VIEWS: CPT

## 2021-12-10 PROCEDURE — 85610 PROTHROMBIN TIME: CPT | Performed by: STUDENT IN AN ORGANIZED HEALTH CARE EDUCATION/TRAINING PROGRAM

## 2021-12-10 PROCEDURE — 250N000013 HC RX MED GY IP 250 OP 250 PS 637: Performed by: NURSE PRACTITIONER

## 2021-12-10 PROCEDURE — 85027 COMPLETE CBC AUTOMATED: CPT | Performed by: STUDENT IN AN ORGANIZED HEALTH CARE EDUCATION/TRAINING PROGRAM

## 2021-12-10 PROCEDURE — 84100 ASSAY OF PHOSPHORUS: CPT | Performed by: STUDENT IN AN ORGANIZED HEALTH CARE EDUCATION/TRAINING PROGRAM

## 2021-12-10 PROCEDURE — 97110 THERAPEUTIC EXERCISES: CPT | Mod: GP

## 2021-12-10 PROCEDURE — 71046 X-RAY EXAM CHEST 2 VIEWS: CPT | Mod: 26 | Performed by: RADIOLOGY

## 2021-12-10 PROCEDURE — 94667 MNPJ CHEST WALL 1ST: CPT

## 2021-12-10 PROCEDURE — 250N000012 HC RX MED GY IP 250 OP 636 PS 637: Performed by: NURSE PRACTITIONER

## 2021-12-10 PROCEDURE — 82947 ASSAY GLUCOSE BLOOD QUANT: CPT | Performed by: STUDENT IN AN ORGANIZED HEALTH CARE EDUCATION/TRAINING PROGRAM

## 2021-12-10 RX ORDER — WARFARIN SODIUM 1 MG/1
2 TABLET ORAL
Status: COMPLETED | OUTPATIENT
Start: 2021-12-10 | End: 2021-12-10

## 2021-12-10 RX ADMIN — PREDNISONE 10 MG: 10 TABLET ORAL at 19:39

## 2021-12-10 RX ADMIN — CEFTAZIDIME 2 G: 2 INJECTION, POWDER, FOR SOLUTION INTRAVENOUS at 04:35

## 2021-12-10 RX ADMIN — Medication 1 PACKET: at 19:41

## 2021-12-10 RX ADMIN — CALCIUM CARBONATE 600 MG (1,500 MG)-VITAMIN D3 400 UNIT TABLET 1 TABLET: at 17:43

## 2021-12-10 RX ADMIN — ROSUVASTATIN CALCIUM 10 MG: 10 TABLET, FILM COATED ORAL at 08:37

## 2021-12-10 RX ADMIN — Medication 1 TABLET: at 08:35

## 2021-12-10 RX ADMIN — CEFTAZIDIME 2 G: 2 INJECTION, POWDER, FOR SOLUTION INTRAVENOUS at 19:52

## 2021-12-10 RX ADMIN — PANTOPRAZOLE SODIUM 40 MG: 40 TABLET, DELAYED RELEASE ORAL at 15:54

## 2021-12-10 RX ADMIN — INSULIN ASPART 3 UNITS: 100 INJECTION, SOLUTION INTRAVENOUS; SUBCUTANEOUS at 16:04

## 2021-12-10 RX ADMIN — INSULIN ASPART 2 UNITS: 100 INJECTION, SOLUTION INTRAVENOUS; SUBCUTANEOUS at 19:52

## 2021-12-10 RX ADMIN — NYSTATIN 1000000 UNITS: 500000 SUSPENSION ORAL at 15:54

## 2021-12-10 RX ADMIN — ACETYLCYSTEINE 2 ML: 200 SOLUTION ORAL; RESPIRATORY (INHALATION) at 09:18

## 2021-12-10 RX ADMIN — LEVALBUTEROL HYDROCHLORIDE 1.25 MG: 1.25 SOLUTION RESPIRATORY (INHALATION) at 20:43

## 2021-12-10 RX ADMIN — Medication 1 PACKET: at 08:48

## 2021-12-10 RX ADMIN — INSULIN GLARGINE 30 UNITS: 100 INJECTION, SOLUTION SUBCUTANEOUS at 19:53

## 2021-12-10 RX ADMIN — HYDROXYZINE HYDROCHLORIDE 50 MG: 50 TABLET, FILM COATED ORAL at 14:26

## 2021-12-10 RX ADMIN — SULFAMETHOXAZOLE AND TRIMETHOPRIM 1 TABLET: 400; 80 TABLET ORAL at 08:34

## 2021-12-10 RX ADMIN — ACETAMINOPHEN 975 MG: 325 TABLET, FILM COATED ORAL at 06:36

## 2021-12-10 RX ADMIN — MYCOPHENOLATE MOFETIL 1000 MG: 250 CAPSULE ORAL at 19:39

## 2021-12-10 RX ADMIN — Medication 400 MG: at 17:43

## 2021-12-10 RX ADMIN — ACETAMINOPHEN 975 MG: 325 TABLET, FILM COATED ORAL at 13:31

## 2021-12-10 RX ADMIN — FLUCONAZOLE IN SODIUM CHLORIDE 400 MG: 2 INJECTION, SOLUTION INTRAVENOUS at 11:17

## 2021-12-10 RX ADMIN — Medication 1 PACKET: at 08:46

## 2021-12-10 RX ADMIN — Medication 1 TABLET: at 08:36

## 2021-12-10 RX ADMIN — ACETYLCYSTEINE 2 ML: 200 SOLUTION ORAL; RESPIRATORY (INHALATION) at 20:43

## 2021-12-10 RX ADMIN — INSULIN ASPART 3 UNITS: 100 INJECTION, SOLUTION INTRAVENOUS; SUBCUTANEOUS at 04:35

## 2021-12-10 RX ADMIN — QUETIAPINE FUMARATE 25 MG: 25 TABLET ORAL at 08:37

## 2021-12-10 RX ADMIN — ACETAMINOPHEN 975 MG: 325 TABLET, FILM COATED ORAL at 21:58

## 2021-12-10 RX ADMIN — LEVALBUTEROL HYDROCHLORIDE 1.25 MG: 1.25 SOLUTION RESPIRATORY (INHALATION) at 09:18

## 2021-12-10 RX ADMIN — WARFARIN SODIUM 2 MG: 1 TABLET ORAL at 17:44

## 2021-12-10 RX ADMIN — Medication 10 ML: at 11:28

## 2021-12-10 RX ADMIN — TACROLIMUS 2.5 MG: 1 CAPSULE ORAL at 17:43

## 2021-12-10 RX ADMIN — QUETIAPINE FUMARATE 25 MG: 25 TABLET ORAL at 13:31

## 2021-12-10 RX ADMIN — QUETIAPINE FUMARATE 50 MG: 50 TABLET ORAL at 19:39

## 2021-12-10 RX ADMIN — PANTOPRAZOLE SODIUM 40 MG: 40 TABLET, DELAYED RELEASE ORAL at 06:36

## 2021-12-10 RX ADMIN — FUROSEMIDE 40 MG: 40 TABLET ORAL at 08:37

## 2021-12-10 RX ADMIN — CYCLOBENZAPRINE HYDROCHLORIDE 10 MG: 5 TABLET, FILM COATED ORAL at 21:58

## 2021-12-10 RX ADMIN — ESCITALOPRAM OXALATE 10 MG: 10 TABLET ORAL at 08:37

## 2021-12-10 RX ADMIN — NYSTATIN 1000000 UNITS: 500000 SUSPENSION ORAL at 08:39

## 2021-12-10 RX ADMIN — Medication 10 MG: at 19:39

## 2021-12-10 RX ADMIN — Medication 800 MG: at 08:35

## 2021-12-10 RX ADMIN — CEFTAZIDIME 2 G: 2 INJECTION, POWDER, FOR SOLUTION INTRAVENOUS at 12:51

## 2021-12-10 RX ADMIN — NYSTATIN 1000000 UNITS: 500000 SUSPENSION ORAL at 12:51

## 2021-12-10 RX ADMIN — MYCOPHENOLATE MOFETIL 1000 MG: 250 CAPSULE ORAL at 08:32

## 2021-12-10 RX ADMIN — PREDNISONE 10 MG: 10 TABLET ORAL at 08:34

## 2021-12-10 RX ADMIN — INSULIN GLARGINE 30 UNITS: 100 INJECTION, SOLUTION SUBCUTANEOUS at 08:25

## 2021-12-10 RX ADMIN — LEVOTHYROXINE SODIUM 25 MCG: 0.03 TABLET ORAL at 08:37

## 2021-12-10 RX ADMIN — AMLODIPINE BESYLATE 5 MG: 2.5 TABLET ORAL at 08:38

## 2021-12-10 RX ADMIN — TACROLIMUS 2.5 MG: 1 CAPSULE ORAL at 08:33

## 2021-12-10 RX ADMIN — SODIUM CHLORIDE, PRESERVATIVE FREE 15 ML: 5 INJECTION INTRAVENOUS at 19:40

## 2021-12-10 RX ADMIN — FLUTICASONE PROPIONATE 1 SPRAY: 50 SPRAY, METERED NASAL at 08:54

## 2021-12-10 RX ADMIN — NYSTATIN 1000000 UNITS: 500000 SUSPENSION ORAL at 19:39

## 2021-12-10 RX ADMIN — CALCIUM CARBONATE 600 MG (1,500 MG)-VITAMIN D3 400 UNIT TABLET 1 TABLET: at 08:35

## 2021-12-10 ASSESSMENT — ACTIVITIES OF DAILY LIVING (ADL)
ADLS_ACUITY_SCORE: 16
ADLS_ACUITY_SCORE: 16
ADLS_ACUITY_SCORE: 15
ADLS_ACUITY_SCORE: 16
ADLS_ACUITY_SCORE: 16
ADLS_ACUITY_SCORE: 15
ADLS_ACUITY_SCORE: 16
ADLS_ACUITY_SCORE: 15
ADLS_ACUITY_SCORE: 16
ADLS_ACUITY_SCORE: 16
ADLS_ACUITY_SCORE: 15
ADLS_ACUITY_SCORE: 16
ADLS_ACUITY_SCORE: 15
ADLS_ACUITY_SCORE: 16

## 2021-12-10 ASSESSMENT — MIFFLIN-ST. JEOR: SCORE: 1413.93

## 2021-12-10 NOTE — PROGRESS NOTES
CLINICAL NUTRITION SERVICES     Nutrition Prescription    RECOMMENDATIONS FOR MDs/PROVIDERS TO ORDER:  1. Adjust free water flushes via feeding tube as needed, pending fluid status.  2. Order vitamin D supplementation if lab 12/4 is low (lab is currently pending).    Malnutrition Status:    See prior RD notes    Recommendations already ordered by Registered Dietitian (RD):  Decreased TF regimen  Kcal counts 12/11-12/13  Modified oral supplements    Future/Additional Recommendations:  See prior RD notes     Summarizing kcal counts, checking oral intake.    Nutrition Support: Vital 1.5 (semi-elemental) via J-port of GJ tube at 80 mL/hr x 16 hrs which provides 1280 mL TF, 1920 kcals (30 kcals/kg), 87 g protein (1.4 g protein/kg), 978 mL H2O, 239 g CHO, and 8+ g fiber daily. Nutrisource Fiber, 2 pkts daily. Each packet of Nutrisource Fiber provides 3 g soluble fiber, 15 kcals, 4 g CHO, and 60 mL H2O.     Diet: Regular diet order with Ensure Clear (berry at 10:00 and 14:00).  Intake: Per discussion with pt today (12/10), he never has an appetite but eats because he needs to eat. He is consuming one to two Ensure Clear oral supplements daily. He did have a cherry Gelatein and liked it. Feels he is eating about the same or maybe a little more over the last few days.    Kcal counts:   12/5       Total Kcals: 240       Total Protein: 5g. # Meals Ordered from Kitchen: 1 meal. # Meals Recorded: 0 meals. # Supplements Recorded: 100% 1 Ensure Clear   12/6       Total Kcals: 1110     Total Protein: 55g. # Meals Ordered from Kitchen: food from outside the hospital. # Meals Recorded: 1 meal - 100% flour tortilla w/ cheese - from outside the hospital. # Supplements Recorded: 100% 3 Ensure Clears, 1 Gelatein Plus    12/7       Total Kcals: 0           Total Protein: 0g. # Meals Ordered from Kitchen: 1 small meal. # Meals Recorded: no intake recorded. # Supplements Recorded: no intake recorded.    * Pt consumed a two-day average of  675 kcals and 30 g protein daily. This does not meet estimated needs of 1900 - 2400 kcal/day (30 - 37 kcal/kg) and 85 - 130+ g protein/day (1.3 - 2+ g/kg/day).    INTERVENTIONS:  Implementation:  Collaboration with other providers: Discussed pt with provider. Per provider, ok to decrease TF in an effort to increase oral intake. Notified of nutrition plan. Rec adjust long-acting insulin accordingly with decrease in TF. Updated RN regarding modified TF orders.   Enteral Nutrition: Decreased Vital 1.5 TF  to 80 mL/hr x ~12 hrs from ~6p-6a (infuse 1 L TF daily) which provides 1000 mL TF, 1500 kcals (23 kcals/kg), 68 g protein (1.1 g protein/kg), 764 mL H2O, 187 g CHO, and 0+ g fiber daily. Continue Nutrisource Fiber, as ordered.   Medical food supplement therapy: Discussed oral supplements with pt. Ordered to rotate berry Ensure Clear with apple Ensure Clear. Ordered orange or cherry Gelatein oral supplements at supper.   Ordered kcal counts 12/11-12/13  Nutrition education for nutrition relationship to health/disease: Encouraged oral intake and small, frequent meals (utilizing oral supplements)    Follow up/Monitoring:  Will continue to follow pt.    Inessa Patel, MS, RD, LD, [facility]   6C Pgr: [phone]

## 2021-12-10 NOTE — PROGRESS NOTES
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 Post-op course complicated encephalopathy, and diffuse weakness, acute to subacute by CVA, afib with RVR, BRENNAN, candidemia/candida empyema, and Code Blue due to bradycardia/asystole and hypotension for significant GIB s/p EGD with adherent clot near PEG tube site s/p clips. Transferred from ICU to medicine on 11/23/2021.  Likely to be decannulated 12/8.  Ready for discharge to ARU.     ILD and NSIP s/p BSLT on 10/16/2021  Acute hypoxic respiratory failure s/p tracheostomy on 10/29/21 and decannulation on 12/8/2021  Bilateral pleural effusions   - Transplant pulmonology consultation appreciated  - IS: s/p basiliximab on 10/16 and 10/20. Continue tacrolimus, MMF, prednisone per transplant pulmonary dosing  - Ppx: Continue bactrim 400-80 mg daily, nystatin QID x6 months  - Monthly Coccidioides no longer suggested by ID  - DSA every 2 weeks   - Levalbuterol and mucomyst QID and pulm toilet and chest PT QID  - PT/OT/SLP   - 40 mg lasix daily   - Oxycodone 5-10 mg Q4H  - Schedule tylenol     Left lung cavitary lesion   Suspect aspiration vs pseudomonal vs K pneumonia induced abscess. CT on 10/25 with LLL nodular opacity. Repeat CT chest on 11/22 with new cavitary lesion in the left lung base, and with multifocal bilateral upper lobe GGO (some of which are new), new 1.8 cm fluid collection with surrounding fat stranding in the left axilla, decreased bilateral loculated pleural  effusions. Indeterminate Quant Gold, but negative tuberculin skin tests on mulitple occurences. S/p BAL on 11/22. ID feels less likely fungal. B D glucan positive but has known candidemia. Cocci ag in urine negatie, serum histo negative, CRAG negative.  -Appreciate transplant infectious disease consultation  - ID recommends stopping zosyn (11/15-11/23), start Ceftazdime 2 grams Q8H (until 12/21) and levaquin 750 mg daily (until 12/07)  - Follow up CT chest in 4 weeks on 12/21  - Please include in the patient's discharge instructions, follow up with Dr. Abebe on 12/21/2021 @6:30 PM in a virtual visit.      Klebsiella pneumoniae and Pseudomonas fluorescens/putida HAP  Klebsiella initially noted trach sputum culture 11/10. Bronch culture 11/12 with Klebsiella and Pseudomonas fluorescens/putida (R-meropenem).     - Abx as above      Invasive candidiasis with Candida albicans  Positive candida BCx 10/20 and 10/22.  BDG fungitell positive (399) on 10/20. Sputum Cx + Candida albicans persistently.  TC 10/23 without evidence of endocarditis. Ophthalmology consult 10/24 with benign dilated fundoscopic exam.  Candida empyema also noted 10/25, chest tubes inadvertently removed by CVTS 10/28, left chest tube replaced by IR 11/3 as above with ongoing Candida on cultures. Repeat pleural fungal cultures and fungal/bacterial blood cultures on 11/18 NGTD. Transplant as noted above following   -Left chest tube removed 12/1  - Initially on Micafungin (10/22-10/27) transition to fluconazole 400 mg IV daily (start date 10/26 end date 12/21)     Hypogammaglobinemia  Received IVIG with plan to repeat IgG on 12/15.    Encephalopathy, resolved  Likely multi-factorial in the setting of stroke, prolonged critical illness.  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 MRI brain on 10/23 with  multifocal subacute infarct within both cerebral hemispheres and left cerebellum. Presumed embolic with afib hx.   - Anticoagulation as noted below      Atrial fibrillation  [id number] for HTN, CVA, and DM2. First noted on 10/18, started on amiodarone drip, and converted to NSR. Metoprolol and amidoarone discontinued on 11/20 due to intermittent bradycardia.   - Anticoagulation with warfarin as noted below  - Continue Rosuvastatin 10 mg daily      Right subclavian DVT   dx on 11/4. Resumed on heparin drip on 11/5 and transitioned to warfarin in setting of thrombocytopenia. HIT panel negative on 11/21.   - Continue warfarin per pharmacy protocol     DM2 with steroid induced hyperglycemia   Hemoglobin A1c 6.6 pre-operatively. Endocrine recently consulted for steroids induced hyperglycemia.   - Continue Lantus 30 mg BID  - Novolog High Sliding scale insulin Q4H  - BG Q4H      Diarrhea   C. Diff negative on 11/20. Rectal tube in place on 11/22. Possibly due to tube feeds or magnesium supplementation.   - Monitor I&O      Severe malnutrition in the context of acute on chronic illness  Patient currently has a PEG J-tube.  -Multivitamin, folic acid, B6, B12 supplements   - TF per nutrition; continue oral diet as well  -Hopefully over the next couple of weeks we can decrease tube feeds and allow the patient to eat more food by mouth     Anxiety and depression   Psychiatry reconsulted and after discussion suggest increasing lexapro. Will discuss with patient regarding starting ritalin in the AM for motivation.  - Lexapro increased to 10 mg daily could increase to 20 in a week ~12/12  -As needed hydroxyzine    Concern for chipped tooth  Poor dental hygiene  Patient noted he feels like he may have chipped his tooth or has a loose tooth after eating guevara. It is not painful.   - Dental hygiene consult placed and chipped tooth stable rec oral hygiene cares     ------ Chronic stable medical problems ------     RA- Dx 5/2021  with + CCP ab and RF. Previously treated with rituximab. Rheum consulted early in admission. On steroids as noted above.   SALTY - Uses CPAP at bedtime prior to admission. Will need to evaluate for BiPAP after decannulation   HTN- Continue PTA amlodipine 5 mg daily. PRN labetalol and hydralazine for goal SBP <140   Hypothyroidism - TSH 3.17 on 11/19/21. Continue PTA levothyroxine 25 mcg daily      ------ Resolved Hospital problems -------     Recurrent GIB - hgb drop on 10/22 s/p 2 unit pRBC transfusion with EGD on 10/23 with NJ/OG tube trauma with scant oozing. Patient then developed progressive hypotension and ultimately CODE BLUE for GIB in setting of anticoagulation with heparin drip, s/p MTP. EGD on 11/2 with large amount of clotted blood in stomach and area of raised mucosa with small adherent clot near PEG tube site that was clipped, no active bleeding.  Maroon stools 11/3, repeat EGD with extensive old blood in stomach, no active bleeding, small nodular area with prior clips clipped again.  Most recent EGD 11/5 with ulcer noted at PEG tube bumper site, gastritis, and suction marks from G tube. TF noted in G tube drainage bag 11/7, AXR with GJ tube tip projecting over proximal duodenum. GJ tube exchanged 11/9 by GI.  - PO PPI BID      HSV  Chronic intermittent active infection pre-transplant with recent HSV infection: crusted lesions throughout left side of jaw, s/p 10 day treatment course of ACV through 10/9.  HSV PCR blood negative 10/17.  S/p ACV ppx as above (started POD #1 instead of POD #8 given HSV history and location).     Hypernatremia, resolved  Due to inability to have oral intake. Resolved now that he is PO and able to access water.       Diet: Regular Diet Adult  Adult Formula Drip Feeding: Continuous Vital 1.5; Jejunostomy; Goal Rate: 80; mL/hr; From: 4:00 PM; 8:00 AM; Medication - Feeding Tube Flush Frequency: See free water flush order. At least 15-30 mL water before and after medication  administration ...  Snacks/Supplements Adult: Ensure Clear; Between Meals    DVT Prophylaxis: Heparin SQ  Watson Catheter: Not present  Central Lines: None  Code Status: Full Code      Disposition Plan   Expected discharge: medically ready to discharge to TCU recommended to transitional care unit once safe disposition plan/ TCU bed available.     The patient's care was discussed with the Patient and Pulm Consultant.    Gume Baldwin MD  Hospitalist Service  [facility]  Securely message with the Vocera Web Console (learn more here)  Text page via [employer] Paging/Directory    Please see sign in/sign out for up to date coverage information    Clinically Significant Risk Factors Present on Admission                ______________________________________________________________________    Interval History   Breathing feels about the same.  Somewhat labored after ambulating from the bed to the wheelchair to go for x-ray.  Appetite is still poor.  Ready to discharge from the hospital.    Four point ROS completed and negative unless listed above    Data reviewed today: I reviewed all medications, new labs and imaging results over the last 24 hours.    Physical Exam   Vital Signs: Temp: 97.5  F (36.4  C) Temp src: Oral BP: 131/84 Pulse: 97   Resp: 16 SpO2: 93 % O2 Device: None (Room air)    Weight: 148 lbs 4.8 oz  General Appearance: Patient is in no acute distress.  He appears comfortable sitting in bed.  He appears stated age.  Respiratory: Normal work of breathing.  Decannulated tracheostomy.  Cardiovascular: RRR, S1/S2,  GI: soft, nontender, nondistended  Skin: no jaundice  Neuro: Alert and oriented to person, place, time, situation.  Speech is normal.  Moves all extremities symmetrically and equally.    Data   Recent Labs   Lab 12/10/21  1222 12/10/21  0823 12/10/21  0647 12/09/21  1227 12/09/21  0641 12/08/21  0921 12/08/21  0604   WBC  --   --  9.0  --  9.2  --  8.5   HGB  --    --  9.3*  --  9.5*  --  8.8*   MCV  --   --  93  --  93  --  94   PLT  --   --  141*  --  142*  --  118*   INR  --   --  2.18*  --  2.18*  --  2.52*   NA  --   --  138  --  138  --  139   POTASSIUM  --   --  4.7  --  4.7  --  4.7   CHLORIDE  --   --  103  --  105  --  106   CO2  --   --  27  --  28  --  27   BUN  --   --  30  --  28  --  35*   CR  --   --  0.69  --  0.64*  --  0.70   ANIONGAP  --   --  8  --  5  --  6   ERIK  --   --  9.0  --  9.1  --  8.9   * 110* 203*   < > 153*   < > 177*   ALBUMIN  --   --  2.5*  --   --   --  2.3*   PROTTOTAL  --   --  5.9*  --   --   --  5.7*   BILITOTAL  --   --  0.5  --   --   --  0.2   ALKPHOS  --   --  105  --   --   --  107   ALT  --   --  24  --   --   --  25   AST  --   --  19  --   --   --  17    < > = values in this interval not displayed.     Recent Results (from the past 24 hour(s))   XR Chest 2 Views    Narrative    EXAM: XR CHEST 2 VW  12/10/2021 10:31 AM     HISTORY:  Interval f/u       COMPARISON:  12/7/2021 and 12/14/2021    FINDINGS:   PA and lateral radiographs of chest. Postoperative changes of  bilateral lung transplantation. Left upper extremity PICC projects  over the mid SVC. Clamshell sternotomy wires are intact. The trachea  is midline. The cardiac silhouette is unchanged. No appreciable  pneumothorax. Slight increase in right-sided pleural effusion with  associated basilar opacities. No significant change in loculated  nonloculated left pleural effusion. Unchanged cavitary left lower lung  lesion. Chronic right clavicle deformity. No acute osseous  abnormality.       Impression    IMPRESSION:   1. Stable post surgical changes from bilateral lung transplantation.  2. Slight increase in right-sided pleural effusion and overall  unchanged loculated left pleural effusions.  3. Stable left lower lobe cavitary nodule.    I have personally reviewed the examination and initial interpretation  and I agree with the findings.    DAVIN ANGUIANO MD          SYSTEM ID:  [id number]     Medications     dextrose Stopped (10/24/21 1008)     Warfarin Therapy Reminder         acetaminophen  975 mg Oral or Feeding Tube Q8H     acetylcysteine  2 mL Nebulization TID     amLODIPine  5 mg Oral Daily     calcium carbonate 600 mg-vitamin D 400 units  1 tablet Oral or Feeding Tube BID w/meals     cefTAZidime  2 g Intravenous Q8H     cyclobenzaprine  10 mg Oral At Bedtime     escitalopram  10 mg Oral or Feeding Tube Daily     fiber modular (NUTRISOURCE FIBER)  1 packet Per Feeding Tube BID     fluconazole  400 mg Intravenous Q24H     fluticasone  1 spray Both Nostrils Daily     folic acid-vit B6-vit B12  1 tablet Oral Daily     furosemide  40 mg Oral Daily     heparin lock flush  5-20 mL Intracatheter Q24H     insulin aspart  1-12 Units Subcutaneous Q4H     insulin glargine  30 Units Subcutaneous BID     levalbuterol  1.25 mg Nebulization TID     levothyroxine  25 mcg Oral Daily     lidocaine  2 patch Transdermal Q24H     lidocaine   Transdermal Q8H     magnesium oxide  800 mg Oral QAM    And     magnesium oxide  400 mg Oral QPM     melatonin  10 mg Oral QPM     menthol   Transdermal Q8H     multivitamin w/minerals  1 tablet Oral Daily     mycophenolate  1,000 mg Oral BID     nystatin  1,000,000 Units Swish & Swallow 4x Daily     pantoprazole  40 mg Oral BID AC     predniSONE  10 mg Oral or Feeding Tube BID     QUEtiapine  25 mg Oral or Feeding Tube BID     QUEtiapine  50 mg Oral At Bedtime     rosuvastatin  10 mg Oral or Feeding Tube Daily     sodium chloride (PF)  10-40 mL Intracatheter Q8H     sodium chloride (PF)  3 mL Intracatheter Q8H     sulfamethoxazole-trimethoprim  1 tablet Oral or Feeding Tube Daily     tacrolimus  2.5 mg Oral QAM     tacrolimus  2.5 mg Oral QPM     warfarin ANTICOAGULANT  2 mg Oral ONCE at 18:00

## 2021-12-10 NOTE — PLAN OF CARE
Pt admitted 9/5 ILD s/p BSLT 10/16 with multiple complications.  SR/ST low 100's, VS'S on RA and denied pain.  TF will continue at 1800 via PEG/J.  Conner counts start at midnight.  Old trach site healing nicely.  Otherwise, pt resting well and up assist of one.  Rehab bed pending.  Continue to monitor and with POC.

## 2021-12-10 NOTE — PLAN OF CARE
Dx: S/p BSLT on 10/16/21    Neuro: A&O x4. Slept throughout night.   Cardiac: SR. VSS. Afebrile  Respiratory: Tolerating RA. LS clear.   GI/: Denied nausea, bowel sounds audible. No BM this shift. Void without difficulty; adequate uo.   Diet: TF cycled at 80mL/hr via PEG/J tube; to be clamped at 1000. Reg diet with supplemental drinks. BG checks q4h.   Skin: Clamshell and old CT sites scabbed. old trach site dressing CDI  Pain: denied  LDAs: L TL PICC with filters.  Electrolytes: Await lab results.  Mobility: Ambulated with 1 in room. No OOB activity this shift.         Plan:  Repeat CXR today. Await insurance approval for ARU placement and open bed.

## 2021-12-10 NOTE — PLAN OF CARE
"D: Admitted with ILD complications, now s/p BSLT 10/16 c/b CVA, encephalopathy, acute resp failure, afib RVR, BRENNAN, candida empyema, pleural effusion s/p CT, PEA arrest d/t GIB 11/12.  Past Medical History: NSIP, RA, bronchiectasis, pHTN, SALTY, HTN, HLD, PLD, hypogammaglobinemia, duodenal anomaly, anxiety, and depression      I: Monitored and assessed pt status; nursing interventions performed as ordered.    A: A&Ox4, tunnel vision loss, tremors, and weakness remain present, although improving, s/p acute to subacute embolic CVA 10/22/12. VSS, afebrile, on RA with sats ~95%. Trach removed yesterday, pt denies shortness of breath/difficulty breathing. Primapore dressing over trach site changed, site appears to be healing well. Sinus Rhythm/Sinus Tach on the tele monitor. Continues on IV abx. Minimal appetite, poor po intake, on cyclic TF's overnight at 80 mL/hr, usually 4 pm-8 am, however, started at 6 pm to allow opportunity for pt to eat dinner as he reported having \"somewhat\" of an appetite--and ended up eating >50% of ordered dinner tray. Blood sugars q four hours. PEG site cleaned and drsg changed, site WDL. Pt able to take medications by mouth. Incontinent of stool x1, otherwise up to commode. Up with SBA/assist of 1; worked with PT today in gym on [employer]. Ezio visited at bedside during afternoon, supportive and interactive with patient and his cares.    P: Tube feeds to run until 10 am tomorrow morning due to later start this evening (6 pm instead of 4 pm). Continue to await TCU bed availability. Contact Medicine [place] [phone] Team for questions or concerns.    Tammie Tadeo, RN  Cardiology  " Hospital chart

## 2021-12-10 NOTE — PROGRESS NOTES
[address] [facility] 
[phone] Patient: Makayla Ledezma MRN: [id number] :1965 You are allergic to the following No active allergies Recent Documentation Height Weight BMI OB Status Smoking Status 1.575 m 78.4 kg 31.61 kg/m2 Hysterectomy Never Smoker Emergency Contacts Name Discharge Info Relation Home Work Mobile Hola Daigle DISCHARGE CAREGIVER [3] Spouse [3] [phone] About your hospitalization You were admitted on:  February 10, 2017 You last received care in the:  \[facility]\"" PACU You were discharged on:  February 10, 2017 Unit phone number:  [phone] Why you were hospitalized Your primary diagnosis was:  Not on File Providers Seen During Your Hospitalizations Provider Role Specialty Primary office phone Claire Nails MD Attending Provider Plastic Surgery [phone] Your Primary Care Physician (PCP) Primary Care Physician Office Phone Office Fax Masha Malin [phone] Follow-up Information Follow up With Details Comments Contact Info [facility] [address] [facility] 
[phone] Claire Nails MD Schedule an appointment as soon as possible for a visit in 2 week(s)  [address] [facility] 
[phone] Current Discharge Medication List  
  
START taking these medications Dose & Instructions Dispensing Information Comments Morning Noon Evening Bedtime  
 cephALEXin 500 mg capsule Commonly known as:  Dene Decree Your next dose is: Today, Tomorrow Other:  _________ Dose:  500 mg Take 1 Cap by mouth four (4) times daily for 5 days. Quantity:  20 Cap Refills:  0 HYDROmorphone 2 mg tablet Patient Name:  Edson Thornton     Anticipated Discharge Date:  Sunday, 12/10/21    Discharge Disposition:   ARU:  [facility]    Transportation Needs: Wheel chair transport  Name of Transportation Company and Phone: [employer]. [phone]      Additional Services/Equipment Arranged:  None.      Persons notified of above discharge plan:  Bret and his fianceAnne-Marie.    Patient / Family response to discharge plan:  Agreeable.      Education provided by  at discharge: Reminded patient of role of transplant  in out patient setting and provided contact info for , and also reminded pt about support group. He stated he is hopeful he will come back to group in the next week or two.     Provided [employer] Donor Letter Writing packet : YES    Patient and family discharge goal: To get home.  Provided Education on discharge plan: YES  Patient agreeable to discharge plan:  YES  A list of Medicare Certified Facilities was provided to the patient and/or family to encourage patient choice. Patient's choices for facility are:  [facility]  Will NH provide Skilled rehabilitation or complex medical:  YES  General information regarding anticipated insurance coverage and possible out of pocket cost was discussed. Patient and patient's family are aware patient may incur the cost of transportation to the facility, pending insurance payment: YES  Barriers to discharge: Bed availability and medical readiness.       Medicare Notice of Rights provided to the patient/family:  YES    KD Shepherd     Commonly known as:  DILAUDID Your next dose is: Today, Tomorrow Other:  _________ Dose:  2 mg Take 1 Tab by mouth every four (4) hours as needed for Pain. Max Daily Amount: 12 mg. Quantity:  40 Tab Refills:  0  
     
   
   
   
  
 promethazine 12.5 mg tablet Commonly known as:  PHENERGAN Your next dose is: Today, Tomorrow Other:  _________ Dose:  12.5 mg Take 1 Tab by mouth every six (6) hours as needed for Nausea. Quantity:  10 Tab Refills:  2 CONTINUE these medications which have NOT CHANGED Dose & Instructions Dispensing Information Comments Morning Noon Evening Bedtime ALLEGRA 180 mg tablet Generic drug:  fexofenadine Your next dose is: Today, Tomorrow Other:  _________ Dose:  180 mg Take 180 mg by mouth daily. Refills:  0 ALPRAZolam 0.25 mg tablet Commonly known as:  Sharyon Kida Your next dose is: Today, Tomorrow Other:  _________ Dose:  0.25 mg Take 1 Tab by mouth three (3) times daily as needed for Anxiety. Max Daily Amount: 0.75 mg. Quantity:  25 Tab Refills:  0  
     
   
   
   
  
 mometasone 50 mcg/actuation nasal spray Commonly known as:  Adolph Pelt Your next dose is: Today, Tomorrow Other:  _________ Dose:  2 Spray 2 Sprays by Both Nostrils route daily. Refills:  0  
     
   
   
   
  
 omeprazole 20 mg capsule Commonly known as:  PRILOSEC Your next dose is: Today, Tomorrow Other:  _________ Dose:  20 mg Take 20 mg by mouth daily. Refills:  0  
     
   
   
   
  
 tamoxifen 20 mg tablet Commonly known as:  NOLVADEX Your next dose is: Today, Tomorrow Other:  _________ Dose:  20 mg Take 20 mg by mouth daily. Refills:  0  
     
   
   
   
  
 traMADol 50 mg tablet Commonly known as:  Stephany Ca  
   
 Your next dose is: Today, Tomorrow Other:  _________ Dose:  50 mg Take 50 mg by mouth every six (6) hours as needed for Pain. Indications: Pain Refills:  0 VOLTAREN 1 % Gel Generic drug:  diclofenac Your next dose is: Today, Tomorrow Other:  _________ Dose:  4 g Apply 4 g to affected area four (4) times daily as needed. Refills:  0 Where to Get Your Medications Information on where to get these meds will be given to you by the nurse or doctor. ! Ask your nurse or doctor about these medications  
  cephALEXin 500 mg capsule HYDROmorphone 2 mg tablet  
 promethazine 12.5 mg tablet Discharge Instructions May remove dressings in 24hrs and shower and get incisions wet. Wear bra at all times unless showering. No heavy lifting or vigorous activity. FOLLOW UP VISIT Appointment in: Two Weeks Call [phone] to make appt DISCHARGE SUMMARY from Nurse The following personal items are in your possession at time of discharge: 
 
Dental Appliances: None Home Medications: None Jewelry: None Clothing: Other (comment) (street clothes) Other Valuables: Eyeglasses (and eyeglass case) Personal Items Sent to Safe: declined PATIENT INSTRUCTIONS: 
 
After general anesthesia or intravenous sedation, for 24 hours or while taking prescription Narcotics: · Limit your activities · Do not drive and operate hazardous machinery · Do not make important personal or business decisions · Do  not drink alcoholic beverages · If you have not urinated within 8 hours after discharge, please contact your surgeon on call. Report the following to your surgeon: 
· Excessive pain, swelling, redness or odor of or around the surgical area · Temperature over 100.5 · Nausea and vomiting lasting longer than 4 hours or if unable to take medications · Any signs of decreased circulation or nerve impairment to extremity: change in color, persistent  numbness, tingling, coldness or increase pain · Any questions What to do at Home: *  Please give a list of your current medications to your Primary Care Provider. *  Please update this list whenever your medications are discontinued, doses are 
    changed, or new medications (including over-the-counter products) are added. *  Please carry medication information at all times in case of emergency situations. These are general instructions for a healthy lifestyle: No smoking/ No tobacco products/ Avoid exposure to second hand smoke Surgeon General's Warning:  Quitting smoking now greatly reduces serious risk to your health. Obesity, smoking, and sedentary lifestyle greatly increases your risk for illness A healthy diet, regular physical exercise & weight monitoring are important for maintaining a healthy lifestyle You may be retaining fluid if you have a history of heart failure or if you experience any of the following symptoms:  Weight gain of 3 pounds or more overnight or 5 pounds in a week, increased swelling in our hands or feet or shortness of breath while lying flat in bed. Please call your doctor as soon as you notice any of these symptoms; do not wait until your next office visit. Recognize signs and symptoms of STROKE: 
 
F-face looks uneven A-arms unable to move or move unevenly S-speech slurred or non-existent T-time-call 911 as soon as signs and symptoms begin-DO NOT go Back to bed or wait to see if you get better-TIME IS BRAIN. Warning Signs of HEART ATTACK Call 911 if you have these symptoms: 
? Chest discomfort. Most heart attacks involve discomfort in the center of the chest that lasts more than a few minutes, or that goes away and comes back. It can feel like uncomfortable pressure, squeezing, fullness, or pain. ? Discomfort in other areas of the upper body. Symptoms can include pain or discomfort in one or both arms, the back, neck, jaw, or stomach. ? Shortness of breath with or without chest discomfort. ? Other signs may include breaking out in a cold sweat, nausea, or lightheadedness. Don't wait more than five minutes to call [address]! Fast action can save your life. Calling 911 is almost always the fastest way to get lifesaving treatment. Emergency Medical Services staff can begin treatment when they arrive  up to an hour sooner than if someone gets to the hospital by car. The discharge information has been reviewed with the patient and caregiver. The patient and caregiver verbalized understanding. Discharge medications reviewed with the patient and caregiver and appropriate educational materials and side effects teaching were provided. A common side effect of anesthesia following surgery is nausea and/or vomiting. In order to decrease symptoms, it is wise to avoid foods that are high in fat, greasy foods, milk products, and spicy foods for the first 24 hours. Acceptable foods for the first 24 hours following surgery include but are not limited to: 
 
? soup 
? broth 
?  toast  
? crackers ? applesauce 
? bananas  
? mashed potatoes, 
? soft or scrambled eggs 
? oatmeal 
?  jello It is important to eat when taking your pain medication. This will help to prevent nausea. If possible, please try to time your meals with your medications. It is very important to stay hydrated following surgery. Sip fluids frequently while awake. Avoid acidic drinks such as citrus juices and soda for 24 hours. Carbonated beverages may cause bloating and gas. Acceptable fluids include: 
 
? water (flavor packets may add variety) ? coffee or tea (in moderation) ? Gatorade ? Lubertha Cardinal ? apple juice 
? cranberry juice You are encouraged to cough and deep breathe every hour when awake.  This will help to prevent respiratory complications following anesthesia. You may want to hug a pillow when coughing and sneezing to add additional support to the surgical area and to decrease discomfort if you had abdominal or chest surgery. If you are discharged home with support stockings, you may remove them after 24 hours. Support stockings are used to help prevent blood clots in the legs following surgery. Please take time to review all of your Home Care Instructions and Medication Information sheets provided in your discharge packet. If you have any questions, please contact your surgeons office. Thank you. How to Care for Your Wound After Its Treated With DERMABOND* Topical Skin Adhesive DERMABOND* Topical Skin Adhesive (2-octyl cyanoacrylate) is a sterile, liquid skin adhesive 
that holds wound edges together. The film will usually remain in place for 5 to 10 days, then 
naturally fall off your skin. The following will answer some of your questions and provide instructions for proper care for your 
wound while it is healing: CHECK WOUND APPEARANCE 
 Some swelling, redness, and pain are common with all wounds and normally will go away as the 
wound heals. If swelling, redness, or pain increases or if the wound feels warm to the touch, 
contact a doctor. Also contact a doctor if the wound edges reopen or separate. REPLACE BANDAGES 
 If your wound is bandaged, keep the bandage dry.  Replace the dressing daily until the adhesive film has fallen off or if the 
bandage should become wet, unless otherwise instructed by your 
physician.  When changing the dressing, do not place tape directly over the DERMABOND adhesive film, because removing the tape later may also 
remove the film. AVOID TOPICAL MEDICATIONS  Do not apply liquid or ointment medications or any other product to your wound while the DERMABOND adhesive film is in place.  These may loosen the film before your wound is healed. KEEP WOUND DRY AND PROTECTED  You may occasionally and briefly wet your wound in the shower or bath. Do not soak or scrub 
your wound, do not swim, and avoid periods of heavy perspiration until the DERMABOND 
adhesive has naturally fallen off. After showering or bathing, gently blot your wound dry with a 
soft towel. If a protective dressing is being used, apply a fresh, dry bandage, being sure to keep 
the tape off the DERMABOND adhesive film.  Apply a clean, dry bandage over the wound if necessary to protect it.  Protect your wound from injury until the skin has had sufficient time to heal. 
 Do not scratch, rub, or pick at the DERMABOND adhesive film. This may loosen the film before 
your wound is healed.  Protect the wound from prolonged exposure to sunlight or tanning lamps while the film is in 
place. If you have any questions or concerns about this product, please consult your doctor. *Trademark ©[employer] 2002 Cephalexin (By mouth) Cephalexin (dck-q-BXO-in) Treats infections. This medicine is a cephalosporin antibiotic. Brand Name(s):Keflex There may be other brand names for this medicine. When This Medicine Should Not Be Used: This medicine is not right for everyone. Do not use this medicine if you had an allergic reaction to cephalexin or another cephalosporin medicine. How to Use This Medicine:  
Capsule, Tablet, Tablet for Suspension, Liquid · Your doctor will tell you how much medicine to use. Do not use more than directed. · Read and follow the patient instructions that come with this medicine. Talk to your doctor or pharmacist if you have any questions. · You may take your medicine with food or milk to avoid stomach upset. · Oral liquid: Shake well just before use. Measure the oral liquid medicine with a marked measuring spoon, oral syringe, or medicine cup.  
· Take all of the medicine in your prescription to clear up your infection, even if you feel better after the first few doses. · Missed dose: Take a dose as soon as you remember. If it is almost time for your next dose, wait until then and take a regular dose. Do not take extra medicine to make up for a missed dose. · Capsule or tablet: Store at room temperature away from heat, moisture, and direct light. · Oral liquid: Store in the refrigerator for 14 days. After 14 days, throw away any unused medicine. Do not freeze. Drugs and Foods to Avoid: Ask your doctor or pharmacist before using any other medicine, including over-the-counter medicines, vitamins, and herbal products. · Some medicines and foods can affect how cephalexin works. Tell your doctor if you are also using ¨ Metformin ¨ Probenecid Warnings While Using This Medicine: · Tell your doctor if you are pregnant or breastfeeding, or if you have kidney disease, liver disease, or a history of digestive problems, such as colitis. Tell your doctor if you are allergic to penicillin. · This medicine can cause diarrhea. Call your doctor if the diarrhea becomes severe, does not stop, or is bloody. Do not take any medicine to stop diarrhea until you have talked to your doctor. Diarrhea can occur 2 months or more after you stop taking this medicine. · Tell any doctor or dentist who treats you that you are using this medicine. This medicine may affect certain medical test results. · Call your doctor if your symptoms do not improve or if they get worse. · Keep all medicine out of the reach of children. Never share your medicine with anyone. Possible Side Effects While Using This Medicine:  
Call your doctor right away if you notice any of these side effects: · Allergic reaction: Itching or hives, swelling in your face or hands, swelling or tingling in your mouth or throat, chest tightness, trouble breathing · Blistering, peeling, red skin rash · Severe diarrhea, especially if bloody or ongoing · Severe stomach pain, vomiting If you notice these less serious side effects, talk with your doctor: · Mild diarrhea or nausea If you notice other side effects that you think are caused by this medicine, tell your doctor. Call your doctor for medical advice about side effects. You may report side effects to FDA at 3-691-FDA-7910 © 2016 [address] is for End User's use only and may not be sold, redistributed or otherwise used for commercial purposes. The above information is an  only. It is not intended as medical advice for individual conditions or treatments. Talk to your doctor, nurse or pharmacist before following any medical regimen to see if it is safe and effective for you. Hydromorphone (By mouth) Hydromorphone (qce-tpzp-ISM-fone) Treats moderate to severe pain. This medicine is a narcotic pain reliever. Brand Name(s):Bridget Gordon There may be other brand names for this medicine. When This Medicine Should Not Be Used: This medicine is not right for everyone. Do not use it if you had an allergic reaction to hydromorphone or sulfites, or if you have severe lung or breathing problems, paralytic ileus, or stomach or bowel blockage or narrowing. How to Use This Medicine:  
Long Acting Capsule, Liquid, Tablet, Long Acting Tablet · Take your medicine as directed. Your dose may need to be changed several times to find what works best for you. An overdose can be dangerous. Follow directions carefully so you do not get too much medicine at one time. · Measure the oral liquid medicine with a marked measuring spoon, oral syringe, or medicine cup. · Swallow the extended-release capsule whole. Do not crush, break, or chew it. · Swallow the extended-release tablet whole. Do not crush, break, or chew it. · If you take the extended-release tablet, part of the tablet may pass into your stools. This is normal and is nothing to worry about. · If you get any of the liquid medicine on your skin, rinse it with cool water right away. · Hydromorphone extended-release capsules or tablets work differently than regular hydromorphone tablets, even at the same dose. Do not switch from one form to another unless your doctor tells you to. · This medicine should come with a Medication Guide. Ask your pharmacist for a copy if you do not have one. · Missed dose:  
¨ Extended-release capsules or tablets: If you miss a dose, skip the missed dose and take your next dose at the usual time the next day. Do not double doses. ¨ Liquid: Take a dose as soon as you remember. If it is almost time for your next dose, wait until then and take a regular dose. Do not take extra medicine to make up for a missed dose. · Store the medicine in a closed container at room temperature, away from heat, moisture, and direct light. Store the medicine in a safe and secure place. Do not throw unused medicine in the trash. Ask your pharmacist about the best way to dispose of medicine you do not use. Drugs and Foods to Avoid: Ask your doctor or pharmacist before using any other medicine, including over-the-counter medicines, vitamins, and herbal products. · Some medicines can affect how hydromorphone works. Tell your doctor if you are using any of the following: ¨ A phenothiazine medicine ¨ An MAO inhibitor (MAOI) within the past 14 days · Tell your doctor if you use anything else that makes you sleepy. Some examples are allergy medicine, narcotic pain medicine, and alcohol. Tell your doctor if you are also using buprenorphine, butorphanol, nalbuphine, pentazocine, or a muscle relaxer. · Do not drink alcohol while you are using this medicine. Warnings While Using This Medicine: · Tell your doctor if you are pregnant or breastfeeding, or if you have kidney disease, liver disease, lung disease or breathing problems (such as asthma or COPD), low blood pressure, an underactive thyroid, [place] disease, cystic fibrosis, pancreas problems, gallbladder problems, an enlarged prostate, trouble urinating, or stomach or bowel problems. Tell your doctor if you have a history of head injury, brain tumor, seizures, depression, or alcohol or drug abuse. · This medicine may cause the following problems: 
¨ High risk of overdose, which can lead to death ¨ Respiratory depression (serious breathing problem that can be life-threatening) · This medicine can be habit-forming. Do not use more than your prescribed dose. Call your doctor if you think your medicine is not working. · Do not stop using this medicine suddenly. Your doctor will need to slowly decrease your dose before you stop it completely. · This medicine may make you dizzy, drowsy, or lightheaded. Do not drive or do anything else that could be dangerous until you know how this medicine affects you. Sit or lie down if you feel dizzy. Stand up carefully. · This medicine may cause constipation, especially with long-term use. Ask your doctor if you should use a laxative to prevent and treat constipation. · Keep all medicine out of the reach of children. Never share your medicine with anyone. Possible Side Effects While Using This Medicine:  
Call your doctor right away if you notice any of these side effects: · Allergic reaction: Itching or hives, swelling in your face or hands, swelling or tingling in your mouth or throat, chest tightness, trouble breathing · Blue lips, fingernails, or skin · Extreme dizziness or weakness, shallow breathing, slow or uneven heartbeat, sweating, cold or clammy skin, seizures · Severe confusion, lightheadedness, dizziness, fainting · Severe constipation, stomach pain, or vomiting · Trouble breathing or slow breathing If you notice these less serious side effects, talk with your doctor: · Mild constipation, nausea, or vomiting · Mild sleepiness or tiredness If you notice other side effects that you think are caused by this medicine, tell your doctor. Call your doctor for medical advice about side effects. You may report side effects to FDA at 9-647-QCR-3683 © 2016 [address] is for End User's use only and may not be sold, redistributed or otherwise used for commercial purposes. The above information is an  only. It is not intended as medical advice for individual conditions or treatments. Talk to your doctor, nurse or pharmacist before following any medical regimen to see if it is safe and effective for you. Promethazine (By mouth) Promethazine (proe-METH-a-zeen) Treats allergies and motion sickness. Also used before and after surgery and other procedures as a sedative and to control pain or nausea and vomiting. This medicine is a phenothiazine. Brand Name(s):Phenergan, Promacot There may be other brand names for this medicine. When This Medicine Should Not Be Used: This medicine is not right for everyone. Do not use it if you had an allergic reaction to promethazine or another phenothiazine medicine, or while you are having asthma symptoms or similar breathing problems. How to Use This Medicine:  
Tablet, Liquid · Your doctor will tell you how much medicine to use. Do not use more than directed. · Measure the oral liquid medicine with a marked measuring spoon, oral syringe, or medicine cup. · Missed dose: Take a dose as soon as you remember. If it is almost time for your next dose, wait until then and take a regular dose. Do not take extra medicine to make up for a missed dose. · Store the medicine in a closed container at room temperature, away from heat, moisture, and direct light. Do not freeze the oral liquid. Drugs and Foods to Avoid: Ask your doctor or pharmacist before using any other medicine, including over-the-counter medicines, vitamins, and herbal products. · Some medicines can affect how promethazine works. Tell your doctor if you are also using an MAO inhibitor (MAOI). · Tell your doctor if you use anything else that makes you sleepy. Some examples are allergy medicine, narcotic pain medicine, and alcohol. Warnings While Using This Medicine: · Tell your doctor if you are pregnant or breastfeeding, or if you have liver disease, heart or blood vessel disease, glaucoma, a stomach ulcer, bowel problems, an enlarged prostate, bone marrow problems, trouble urinating, or seizures. Also tell your doctor if you have breathing problems, such as COPD, asthma, or sleep apnea. · This medicine may cause the following problems: ¨ Breathing problems, which could be life-threatening ¨ Neuroleptic malignant syndrome (a nerve disorder that can be life-threatening) ¨ Liver problems · Use in children: Give the medicine exactly as directed by the child's doctor. Too much of this medicine can cause death in a young child. Do not give this medicine to a child younger than 3years old, unless your doctor tells you to. · This medicine may make you dizzy or drowsy. Do not drive or do anything that could be dangerous until you know how this medicine affects you. · Tell any doctor or dentist who treats you that you are using this medicine. This medicine may affect certain medical test results. · This medicine may make your skin more sensitive to sunlight. Wear sunscreen. Do not use sunlamps or tanning beds. · Keep all medicine out of the reach of children. Never share your medicine with anyone. Possible Side Effects While Using This Medicine:  
Call your doctor right away if you notice any of these side effects: · Allergic reaction: Itching or hives, swelling in your face or hands, swelling or tingling in your mouth or throat, chest tightness, trouble breathing · Fever, sweating, confusion, uneven heartbeat, muscle stiffness · Lightheadedness or fainting · Seeing or hearing things that are not there (especially in children) · Seizures · Trouble breathing, slow breathing · Twitching or muscle movements you cannot control · Yellow skin or eyes If you notice these less serious side effects, talk with your doctor: · Blurred vision · Nausea, vomiting, constipation If you notice other side effects that you think are caused by this medicine, tell your doctor. Call your doctor for medical advice about side effects. You may report side effects to FDA at 6-590-RBT-9988 © 2016 [address] is for End User's use only and may not be sold, redistributed or otherwise used for commercial purposes. The above information is an  only. It is not intended as medical advice for individual conditions or treatments. Talk to your doctor, nurse or pharmacist before following any medical regimen to see if it is safe and effective for you. Discharge Orders None Introducing [facility]! Dear Erikmaddie Strickland: Thank you for requesting a [employer] account. Our records indicate that you already have an active [employer] account. You can access your account anytime at [email or web address] Did you know that you can access your hospital and ER discharge instructions at any time in [employer]? You can also review all of your test results from your hospital stay or ER visit. Additional Information If you have questions, please visit the Frequently Asked Questions section of the [employer] website at [email or web address] Remember, [employer] is NOT to be used for urgent needs. For medical emergencies, dial 911. Now available from your iPhone and Android! General Information Please provide this summary of care documentation to your next provider. Patient Signature:  ____________________________________________________________ Date:  ____________________________________________________________  
  
Suzon Mems Provider Signature:  ____________________________________________________________ Date:  ____________________________________________________________

## 2021-12-10 NOTE — PROGRESS NOTES
Rehab Admissions:  I met w/ Bret and his significant other Peg this morning to discuss [facility]. We discussed skilled PT/OT/SLP services for ELOS of 2-3 weeks prior to disch to [facility]. Peg will be his designated visitor. Discussed visitor policy and [facility]C location w/ option for Peg to take shuttle on the weekdays. Bret and Peg were receptive to our discussion. Bret is eager to disch, since he reports he has been hospitalized since this summer. [facility] insurance auth for [facility] has been obtained. Pt is currently on AR[facility] waitlist, pending bed availability.     Thank you for the referral, we will continue to follow this patient for post acute placement.     Determination of admission is based upon the patient's need for an intensive, interdisciplinary approach to rehabilitation, their ability to progress, their ability to tolerate intensive therapies, their need for daily physician supervision, their need for twenty four hour nursing assistance, and their ability and willingness to participate in such a program.    Jayda Warren CM  Rehab Liaison/  [facility] [place] [facility]  12/10/2021    11:40 AM

## 2021-12-11 LAB
ACID FAST STAIN (ARUP): NORMAL
ACID FAST STAIN (ARUP): NORMAL
GLUCOSE BLDC GLUCOMTR-MCNC: 109 MG/DL (ref 70–99)
GLUCOSE BLDC GLUCOMTR-MCNC: 113 MG/DL (ref 70–99)
GLUCOSE BLDC GLUCOMTR-MCNC: 150 MG/DL (ref 70–99)
GLUCOSE BLDC GLUCOMTR-MCNC: 165 MG/DL (ref 70–99)
GLUCOSE BLDC GLUCOMTR-MCNC: 171 MG/DL (ref 70–99)
GLUCOSE BLDC GLUCOMTR-MCNC: 99 MG/DL (ref 70–99)
HOLD SPECIMEN: NORMAL
INR PPP: 2.24 (ref 0.85–1.15)
PHOSPHATE SERPL-MCNC: 3.4 MG/DL (ref 2.5–4.5)
POTASSIUM BLD-SCNC: 4.9 MMOL/L (ref 3.4–5.3)
TACROLIMUS BLD-MCNC: 11.6 UG/L (ref 5–15)
TME LAST DOSE: NORMAL H
TME LAST DOSE: NORMAL H

## 2021-12-11 PROCEDURE — 84132 ASSAY OF SERUM POTASSIUM: CPT | Performed by: STUDENT IN AN ORGANIZED HEALTH CARE EDUCATION/TRAINING PROGRAM

## 2021-12-11 PROCEDURE — 250N000011 HC RX IP 250 OP 636: Performed by: STUDENT IN AN ORGANIZED HEALTH CARE EDUCATION/TRAINING PROGRAM

## 2021-12-11 PROCEDURE — 999N000044 HC STATISTIC CVC DRESSING CHANGE

## 2021-12-11 PROCEDURE — 94640 AIRWAY INHALATION TREATMENT: CPT

## 2021-12-11 PROCEDURE — 94640 AIRWAY INHALATION TREATMENT: CPT | Mod: 76

## 2021-12-11 PROCEDURE — 250N000013 HC RX MED GY IP 250 OP 250 PS 637: Performed by: STUDENT IN AN ORGANIZED HEALTH CARE EDUCATION/TRAINING PROGRAM

## 2021-12-11 PROCEDURE — 99233 SBSQ HOSP IP/OBS HIGH 50: CPT | Mod: 24 | Performed by: PHYSICIAN ASSISTANT

## 2021-12-11 PROCEDURE — 84100 ASSAY OF PHOSPHORUS: CPT | Performed by: STUDENT IN AN ORGANIZED HEALTH CARE EDUCATION/TRAINING PROGRAM

## 2021-12-11 PROCEDURE — 250N000013 HC RX MED GY IP 250 OP 250 PS 637: Performed by: THORACIC SURGERY (CARDIOTHORACIC VASCULAR SURGERY)

## 2021-12-11 PROCEDURE — 250N000013 HC RX MED GY IP 250 OP 250 PS 637: Performed by: ANESTHESIOLOGY

## 2021-12-11 PROCEDURE — 214N000001 HC R&B CCU UMMC

## 2021-12-11 PROCEDURE — 250N000012 HC RX MED GY IP 250 OP 636 PS 637: Performed by: PHYSICIAN ASSISTANT

## 2021-12-11 PROCEDURE — 36592 COLLECT BLOOD FROM PICC: CPT | Performed by: STUDENT IN AN ORGANIZED HEALTH CARE EDUCATION/TRAINING PROGRAM

## 2021-12-11 PROCEDURE — 80197 ASSAY OF TACROLIMUS: CPT | Performed by: NURSE PRACTITIONER

## 2021-12-11 PROCEDURE — 250N000012 HC RX MED GY IP 250 OP 636 PS 637: Performed by: NURSE PRACTITIONER

## 2021-12-11 PROCEDURE — 99233 SBSQ HOSP IP/OBS HIGH 50: CPT | Performed by: STUDENT IN AN ORGANIZED HEALTH CARE EDUCATION/TRAINING PROGRAM

## 2021-12-11 PROCEDURE — 250N000013 HC RX MED GY IP 250 OP 250 PS 637: Performed by: NURSE PRACTITIONER

## 2021-12-11 PROCEDURE — 250N000012 HC RX MED GY IP 250 OP 636 PS 637: Performed by: STUDENT IN AN ORGANIZED HEALTH CARE EDUCATION/TRAINING PROGRAM

## 2021-12-11 PROCEDURE — 999N000157 HC STATISTIC RCP TIME EA 10 MIN

## 2021-12-11 PROCEDURE — 94668 MNPJ CHEST WALL SBSQ: CPT

## 2021-12-11 PROCEDURE — 85610 PROTHROMBIN TIME: CPT | Performed by: STUDENT IN AN ORGANIZED HEALTH CARE EDUCATION/TRAINING PROGRAM

## 2021-12-11 PROCEDURE — 250N000009 HC RX 250: Performed by: NURSE PRACTITIONER

## 2021-12-11 RX ORDER — TACROLIMUS 1 MG/1
2 CAPSULE ORAL
Status: DISCONTINUED | OUTPATIENT
Start: 2021-12-11 | End: 2021-12-13 | Stop reason: HOSPADM

## 2021-12-11 RX ADMIN — NYSTATIN 1000000 UNITS: 500000 SUSPENSION ORAL at 19:57

## 2021-12-11 RX ADMIN — INSULIN ASPART 2 UNITS: 100 INJECTION, SOLUTION INTRAVENOUS; SUBCUTANEOUS at 00:27

## 2021-12-11 RX ADMIN — PANTOPRAZOLE SODIUM 40 MG: 40 TABLET, DELAYED RELEASE ORAL at 16:09

## 2021-12-11 RX ADMIN — ACETAMINOPHEN 975 MG: 325 TABLET, FILM COATED ORAL at 22:11

## 2021-12-11 RX ADMIN — QUETIAPINE FUMARATE 25 MG: 25 TABLET ORAL at 08:22

## 2021-12-11 RX ADMIN — LEVOTHYROXINE SODIUM 25 MCG: 0.03 TABLET ORAL at 08:22

## 2021-12-11 RX ADMIN — FLUCONAZOLE IN SODIUM CHLORIDE 400 MG: 2 INJECTION, SOLUTION INTRAVENOUS at 10:27

## 2021-12-11 RX ADMIN — MYCOPHENOLATE MOFETIL 1000 MG: 250 CAPSULE ORAL at 19:57

## 2021-12-11 RX ADMIN — AMLODIPINE BESYLATE 5 MG: 2.5 TABLET ORAL at 08:22

## 2021-12-11 RX ADMIN — CYCLOBENZAPRINE HYDROCHLORIDE 10 MG: 5 TABLET, FILM COATED ORAL at 22:12

## 2021-12-11 RX ADMIN — SODIUM CHLORIDE, PRESERVATIVE FREE 5 ML: 5 INJECTION INTRAVENOUS at 19:58

## 2021-12-11 RX ADMIN — ACETYLCYSTEINE 2 ML: 200 SOLUTION ORAL; RESPIRATORY (INHALATION) at 18:16

## 2021-12-11 RX ADMIN — CALCIUM CARBONATE 600 MG (1,500 MG)-VITAMIN D3 400 UNIT TABLET 1 TABLET: at 17:45

## 2021-12-11 RX ADMIN — INSULIN GLARGINE 30 UNITS: 100 INJECTION, SOLUTION SUBCUTANEOUS at 19:59

## 2021-12-11 RX ADMIN — ACETAMINOPHEN 975 MG: 325 TABLET, FILM COATED ORAL at 14:38

## 2021-12-11 RX ADMIN — QUETIAPINE FUMARATE 50 MG: 50 TABLET ORAL at 19:57

## 2021-12-11 RX ADMIN — PREDNISONE 10 MG: 10 TABLET ORAL at 08:20

## 2021-12-11 RX ADMIN — INSULIN ASPART 1 UNITS: 100 INJECTION, SOLUTION INTRAVENOUS; SUBCUTANEOUS at 20:15

## 2021-12-11 RX ADMIN — INSULIN ASPART 2 UNITS: 100 INJECTION, SOLUTION INTRAVENOUS; SUBCUTANEOUS at 04:32

## 2021-12-11 RX ADMIN — Medication 1 TABLET: at 08:22

## 2021-12-11 RX ADMIN — CEFTAZIDIME 2 G: 2 INJECTION, POWDER, FOR SOLUTION INTRAVENOUS at 20:15

## 2021-12-11 RX ADMIN — FUROSEMIDE 40 MG: 40 TABLET ORAL at 08:20

## 2021-12-11 RX ADMIN — NYSTATIN 1000000 UNITS: 500000 SUSPENSION ORAL at 11:42

## 2021-12-11 RX ADMIN — LEVALBUTEROL HYDROCHLORIDE 1.25 MG: 1.25 SOLUTION RESPIRATORY (INHALATION) at 18:19

## 2021-12-11 RX ADMIN — Medication 1 PACKET: at 08:23

## 2021-12-11 RX ADMIN — NYSTATIN 1000000 UNITS: 500000 SUSPENSION ORAL at 08:22

## 2021-12-11 RX ADMIN — Medication 1 PACKET: at 19:58

## 2021-12-11 RX ADMIN — LEVALBUTEROL HYDROCHLORIDE 1.25 MG: 1.25 SOLUTION RESPIRATORY (INHALATION) at 13:53

## 2021-12-11 RX ADMIN — ROSUVASTATIN CALCIUM 10 MG: 10 TABLET, FILM COATED ORAL at 08:22

## 2021-12-11 RX ADMIN — PREDNISONE 10 MG: 10 TABLET ORAL at 19:57

## 2021-12-11 RX ADMIN — CEFTAZIDIME 2 G: 2 INJECTION, POWDER, FOR SOLUTION INTRAVENOUS at 11:42

## 2021-12-11 RX ADMIN — SULFAMETHOXAZOLE AND TRIMETHOPRIM 1 TABLET: 400; 80 TABLET ORAL at 08:22

## 2021-12-11 RX ADMIN — Medication 400 MG: at 17:45

## 2021-12-11 RX ADMIN — LEVALBUTEROL HYDROCHLORIDE 1.25 MG: 1.25 SOLUTION RESPIRATORY (INHALATION) at 07:56

## 2021-12-11 RX ADMIN — NYSTATIN 1000000 UNITS: 500000 SUSPENSION ORAL at 16:09

## 2021-12-11 RX ADMIN — Medication 1.5 MG: at 17:46

## 2021-12-11 RX ADMIN — ACETYLCYSTEINE 2 ML: 200 SOLUTION ORAL; RESPIRATORY (INHALATION) at 07:56

## 2021-12-11 RX ADMIN — QUETIAPINE FUMARATE 25 MG: 25 TABLET ORAL at 14:39

## 2021-12-11 RX ADMIN — Medication 1 TABLET: at 08:21

## 2021-12-11 RX ADMIN — CEFTAZIDIME 2 G: 2 INJECTION, POWDER, FOR SOLUTION INTRAVENOUS at 04:29

## 2021-12-11 RX ADMIN — HYDROXYZINE HYDROCHLORIDE 50 MG: 50 TABLET, FILM COATED ORAL at 14:39

## 2021-12-11 RX ADMIN — TACROLIMUS 2.5 MG: 1 CAPSULE ORAL at 08:21

## 2021-12-11 RX ADMIN — ACETYLCYSTEINE 2 ML: 200 SOLUTION ORAL; RESPIRATORY (INHALATION) at 13:53

## 2021-12-11 RX ADMIN — INSULIN GLARGINE 30 UNITS: 100 INJECTION, SOLUTION SUBCUTANEOUS at 08:27

## 2021-12-11 RX ADMIN — Medication 10 MG: at 19:57

## 2021-12-11 RX ADMIN — TACROLIMUS 2 MG: 1 CAPSULE ORAL at 17:45

## 2021-12-11 RX ADMIN — FLUTICASONE PROPIONATE 1 SPRAY: 50 SPRAY, METERED NASAL at 08:23

## 2021-12-11 RX ADMIN — PANTOPRAZOLE SODIUM 40 MG: 40 TABLET, DELAYED RELEASE ORAL at 08:22

## 2021-12-11 RX ADMIN — ESCITALOPRAM OXALATE 10 MG: 10 TABLET ORAL at 08:21

## 2021-12-11 RX ADMIN — MYCOPHENOLATE MOFETIL 1000 MG: 250 CAPSULE ORAL at 08:21

## 2021-12-11 RX ADMIN — Medication 800 MG: at 08:21

## 2021-12-11 RX ADMIN — CALCIUM CARBONATE 600 MG (1,500 MG)-VITAMIN D3 400 UNIT TABLET 1 TABLET: at 08:21

## 2021-12-11 ASSESSMENT — ACTIVITIES OF DAILY LIVING (ADL)
ADLS_ACUITY_SCORE: 16
ADLS_ACUITY_SCORE: 15
ADLS_ACUITY_SCORE: 15
ADLS_ACUITY_SCORE: 16
ADLS_ACUITY_SCORE: 15
ADLS_ACUITY_SCORE: 15
ADLS_ACUITY_SCORE: 16
ADLS_ACUITY_SCORE: 16
ADLS_ACUITY_SCORE: 15
ADLS_ACUITY_SCORE: 16
ADLS_ACUITY_SCORE: 15
ADLS_ACUITY_SCORE: 15

## 2021-12-11 ASSESSMENT — MIFFLIN-ST. JEOR: SCORE: 1420.28

## 2021-12-11 NOTE — PROGRESS NOTES
Pulmonary Medicine  Cystic Fibrosis - Lung Transplant Team  Progress Note  12/10/2021       Patient: Edson Thornton  MRN: [id number]  : 1965 (age 56 year old)  Transplant: 10/16/2021 (Lung), POD#55  Admission date: 2021    Assessment & Plan:     Edson Thornton is a 56 year old male with a PMH significant for NSIP/ILD, bronchiectasis, moderate PH, RA, SALTY, chronic HSV infection, hypogammaglobulinemia, steroid-induced diabetes, hypothyroidism, PFO, HTN, HLD, duodenal anomaly, anxiety, and depression.  Admitted on 21 from OSH for acute on chronic respiratory failure 2/2 ILD exacerbation, now s/p BSLT on 10/16/21.  Prolonged vent wean s/p trach (10/29-) and PEG/J tube (10/29).  Post-op course also complicated by encephalopathy and diffuse weakness, acute to subacute CVA, afib with RVR, BRENNAN, GI bleed, Candidemia/Candida empyema, and anxiety.  Code called  for bradycardia/asystole, progressive hypotensive, found to have significant GI bleed, EGD with adherent clot near PEG tube site that was clipped.  Ongoing improvement in weakness, pain, and respiratory status.  Discharge to ARU pending insurance approval and bed availability     Today's recommendations:  - Tacro level  (ordered)  - Next prednisone taper due   - Will need CMV and EBV on   - If going to TCU: then CXR/CBC/BMP and Tac levels on MOn/Thursday.  - Repeat DSA ordered  and follow [id number] ()  - Following recent bacterial and fungal sputum cultures on  and   - Monthly blood Coccidioides Ag due  (ordered)  - Continue ceftaz and fluconazole courses through   - Repeat IgG ordered 12/15  - Evaluate need for BiPAP after trach site has healed     S/p BSLT for ILD:  Acute hypoxic respiratory failure, Resolved:   Prolonged vent weaning s/p trach, Resolved:  Bilateral pleural effusions: Explant pathology with NSIP, no malignancy.  Prolonged vent wean s/p trach and PEG/J tube placement with thoracic  surgery 10/29.  Code called 11/2 for bradycardia/asystole (required 1 round of CPR, no medications) then progressively hypotensive, GI bleed as below.  S/p left chest tube placement (11/3-11/30) for pleural effusion/empyem s/p lytics 11/25-11/28 (CXR showing trapped left lung), right thoracentesis 11/27 (cultures negative).  Chest CT (11/22) with new LLL cavitary lesion with multifocal GGO in BUL, new 1.8 cm fluid collection with surrounding fat stranding in the left axilla, decreased bilateral loculated pleural effusions, and similar small pericardial effusion.  Hypoxia resolved 12/1.  CXR (12/7) with stable effusions and RLL opacity.  Trach decannulated 12/8.   and trach has remained capped exclusively since 11/30, tolerating well.    - Nebs: levalbuterol and Mucomyst TID (increased 12/8) d/t chest tightness and dyspnea with tenacious secretions  - Pulmonary toilet with chest physiotherapy BID, continue for now  - Volume management per primary team  - Repeat CXR 12/10 (ordered)     Immunosuppression: s/p induction therapy with basiliximab 10/16 (and high dose IV steroid) and 10/20  - Tacrolimus 2.5 mg qBID (increased 12/6).  Goal level 8-12. Repeat level 12/11 (ordered).  - MMF 1000 mg BID (decreased 11/2 given GI bleed, AZA to be avoided given TPMT)  - Prednisolone 10 mg BID, next taper due 1/2/22 (not yet ordered)  Date AM dose (mg) PM dose (mg)   12/5/21 10 10   1/2/22 10 7.5   1/30/22 7.5 7.5   2/27/22 7.5 5   3/27/22 5 5   4/24/22 5 2.5      Prophylaxis:   - Bactrim for PJP ppx (held 11/2-11/5 d/t BRENNAN)  - Nystatin for oral candidiasis ppx, 6 month course  - See below for serologies and viral ppx:    Donor Recipient Intervention   CMV status Negative Negative None, CMV monthly (ordered 12/14)   EBV status Positive Positive None, EBV monthly (ordered 12/14)   HSV status N/A Positive S/p acyclovir POD #1-30 (recent infection history pre-txp)      Positive cross match:   Positive DSA: Note that he received two  doses of rituximab in June, which is likely contributing to cross match result.  DSA newly positive 11/29 with DQB5 (mfi 2522), decreased with f/u on 12/6  - Repeat DSA ordered 12/13     ID: Concern for possible Strongyloides exposure pre-transplant s/p ivermectin x1 dose (9/17).  Donor and recipient cultures NGTD.  S/p IV ceftazidime/vancomycin for 48h per protocol and additional empiric ceftazidime 10/19-10/23 given recurrent fevers as below.  Cryptococcal Ag negative 10/29.  Reports some blood mixed in sputum 12/3, persisting intermittently.   - Bacterial (12/5) and fungal (12/4) sputum cultures NGTD  - Monthly blood Coccidioides Ag x12 months post-transplant per ID (urine and serum negative 11/17), due 12/17 (ordered)     Klebsiella pneumoniae:  Pseudomonas fluorescens/putida:   LLL cavity: Klebsiella initially noted trach sputum culture 11/10.  Started on ceftriaxone 11/11, transitioned to ertapenem 11/12-11/13, and back to ceftriaxone 11/14.  Bronch culture 11/12 with Klebsiella and Pseudomonas fluorescens/putida (R-meropenem).  Chest CT 11/22 with LLL cavity as above, BAL with Klebsiella pneumoniae.  Histo Ag 11/22, 11/23 and Blast Ag 11/22 negative.  - ABX: ceftazidime (11/23-12/21) per transplant ID; s/p Zosyn (11/15-11/23) and levofloxacin (11/23-12/7)  - Transplant ID follow up scheduled 12/21 with repeat chest CT prior      Disseminated Candida: Noted on blood cultures 10/20 and 10/22.  BDG fungitell positive (399) on 10/20.  Respiratory cultures with persistent Candida albicans.  TC 10/23 without evidence of endocarditis.  Ophthalmology consult 10/24 with benign dilated fundoscopic exam.  Candida empyema also noted 10/25, chest tubes inadvertently removed by CVTS 10/28, left chest tube replaced by IR 11/3 as above with ongoing Candida on cultures (11/3, 11/18).  BDG fungitell positive (>500) and Aspergillus galactomannan negative 11/22.  - Fluconazole (10/26-12/21) per transplant ID (ETG 12/7  stable)     HSV: Chronic intermittent active infection pre-transplant with recent HSV infection: crusted lesions throughout left side of jaw, s/p 10 day treatment course of ACV through 10/9.  HSV PCR blood negative 10/17.  S/p ACV ppx as above (started POD #1 instead of POD #8 given HSV history and location).     Hypogammaglobulinemia: IgG previously low at 364 (9/7).  Noted at 265 at time of transplant, s/p IVIG 10/21, repeat IgG (11/17) 198, s/p IVIG (with premedication) 11/18.  - Repeat IgG (12/15, ordered)      SALTY: Noted pre-transplant.  Home CPAP 6-12 cm H2O.  - Evaluate need for BiPAP after trach site has healed     Other relevant problems being managed by primary team:     Acute to subacute embolic CVA:   Encephalopathy and diffuse weakness, Improving: Stroke code 10/22 d/t limited movement of BLE, CT head with infarcts in bilateral cerebral hemispheres and left cerebella hemisphere (presumed embolic), no acute intracranial hemorrhage.  MRI 10/23 with multifocal subacute infarcts within both cerebral hemispheres and left cerebellum.  DDx include surgery v embolic v infectious.  Heparin drip started 10/23 (intermittently held with GI bleed as below).  Repeat stroke code 10/25 d/t marked decrease in responsiveness with sedation wean, pupil inequality, and absent gag reflex.  CTA head without obvious new pathology, MRI brain (with and without contrast) primarily revealing for infarct, low likelihood of PRES.  Ammonia normal.  VEEG per neuro with severe diffuse encephalopathy.  Ongoing improvement since 10/29, repeat head CT 11/2 (following code) without new acute intracranial abnormalities.  - AC management per primary team  - PT/OT      Afib with RVR: Noted 10/18, started on amiodarone drip and converted to SR, transitioned to PO 10/21, decreased 10/29.  Metoprolol and amiodarone discontinued 11/19 d/t intermittent bradycardia.  AC per primary team.      Right subclavian DVT: Seen on UE US from  "11/4.  Heparin drip resumed 11/5, transitioned to warfarin 11/20 and in the setting of thrombocytopenia.  HIT negative 11/21.     Hypomagnesemia: Suppressed reabsorption 2/2 CNI.  Requiring intermittent IV and PO replacement.  - Mag-Ox 800 mg qAM / 400 mg qPM (increased 12/8)     Mild thrombocytopenia: Improving but not back to baseline, stable in 110's.     We appreciate the excellent care provided by the [place] [id number] team.  Recommendations communicated via telephone and this note.  Will continue to follow along closely, please do not hesitate to call with any questions or concerns.       Subjective & Interval History:     No acute events.  Remains on RA, no issues after trach decannulated.  Tolerating cycle tube feeds, loose stools stable at 1-2 yesterday.    Review of Systems:     C: No fever, no chills, no change in weight, no change in appetite  INTEGUMENTARY/SKIN: No rash or obvious new lesions  ENT/MOUTH: No sore throat, no sinus pain, + nasal congestion and drainage  RESP: See interval history  CV: No chest pain, no palpitations, no peripheral edema, no orthopnea  GI: No nausea, no vomiting, no reflux symptoms  : No dysuria  MUSCULOSKELETAL: No myalgias, no arthralgias  ENDOCRINE: Blood sugars intermittently elevated  NEURO: + occ headache, no new numbness or tingling  PSYCHIATRIC: See interval history    Physical Exam:     Vital signs:  Temp: 97.8  F (36.6  C) Temp src: Oral BP: 118/79 Pulse: 101   Resp: 18 SpO2: 97 % O2 Device: None (Room air) Oxygen Delivery: 2 LPM Height: 162.6 cm (5' 4.02\") Weight: 67.3 kg (148 lb 4.8 oz)  I/O:     Intake/Output Summary (Last 24 hours) at 12/9/2021 0832  Last data filed at 12/9/2021 0632  Gross per 24 hour   Intake 2120 ml   Output 2075 ml   Net 45 ml     Constitutional: Lying in bed in no apparent distress.   HEENT: Eyes with pink conjunctivae, anicteric.  Oral mucosa moist without lesions.  Trach stoma site cdi.  PULM: Diminished bases bilaterally.  No crackles, " no rhonchi, no wheezes.  Non-labored breathing on RA.  CV: Normal S1 and S2.  RRR.  No murmur, gallop, or rub.  No peripheral edema.   ABD: NABS, soft, nontender, nondistended.  PEG/J site not visualized.  MSK: Moves all extremities.   NEURO: Sleeping but easily arousable, conversant.   SKIN: Warm, dry.  No rash on limited exam. Lesion to right lower lip.  PSYCH: Flat affect,calm.    Lines, Drains, and Devices:  PICC Triple Lumen 11/04/21 Left Basilic Access. PICC okay to use. (Active)   Site Assessment WDL 12/08/21 2259   External Cath Length (cm) 1 cm 12/05/21 1027   Extremity Circumference (cm) 28 cm 11/28/21 1038   Dressing Intervention Chlorhexidine patch;Transparent 12/08/21 2259   Dressing Change Due 12/12/21 12/08/21 2259   PICC Comment site check, CDI, SL, ecchymotic 12/08/21 1023   Mosley - Status saline locked 12/08/21 2259   Mosley - Cap Change Due 12/10/21 12/08/21 2259   Red - Status saline locked 12/08/21 2259   Red - Cap Change Due 12/10/21 12/08/21 2259   White - Status saline locked 12/08/21 2259   White - Cap Change Due 12/10/21 12/08/21 2259   Extravasation? No 12/08/21 2259   Line Necessity Yes, meets criteria 12/08/21 2259   Number of days: 35     Data:     LABS    CMP:   Recent Labs   Lab 12/10/21  1952 12/10/21  1559 12/10/21  1222 12/10/21  0823 12/10/21  0647 12/09/21  1227 12/09/21  0641 12/08/21  0921 12/08/21  0604 12/07/21  0833 12/07/21  0605 12/06/21  0839 12/06/21  0554   NA  --   --   --   --  138  --  138  --  139  --  139  --  141   POTASSIUM  --   --   --   --  4.7  --  4.7  --  4.7  --  4.8  --  4.1   CHLORIDE  --   --   --   --  103  --  105  --  106  --  107  --  106   CO2  --   --   --   --  27  --  28  --  27  --  25  --  26   ANIONGAP  --   --   --   --  8  --  5  --  6  --  7  --  9   * 208* 136* 110* 203*   < > 153*   < > 177*   < > 174*   < > 199*   BUN  --   --   --   --  30  --  28  --  35*  --  31*  --  23   CR  --   --   --   --  0.69  --  0.64*  --  0.70  --   0.68  --  0.65*   GFRESTIMATED  --   --   --   --  >90  --  >90  --  >90  --  >90  --  >90   ERIK  --   --   --   --  9.0  --  9.1  --  8.9  --  8.8  --  9.1   MAG  --   --   --   --  1.6  --   --   --  1.7  --  1.9  --  1.4*   PHOS  --   --   --   --  3.6  --   --   --  2.9  --  3.4  --  3.1   PROTTOTAL  --   --   --   --  5.9*  --   --   --  5.7*  --  5.8*  --  6.1*   ALBUMIN  --   --   --   --  2.5*  --   --   --  2.3*  --  2.3*  --  2.4*   BILITOTAL  --   --   --   --  0.5  --   --   --  0.2  --  <0.1*  --  0.2   ALKPHOS  --   --   --   --  105  --   --   --  107  --  106  --  114   AST  --   --   --   --  19  --   --   --  17  --  22  --  26   ALT  --   --   --   --  24  --   --   --  25  --  27  --  32    < > = values in this interval not displayed.     CBC:   Recent Labs   Lab 12/10/21  0647 12/09/21  0641 12/08/21  0604 12/07/21  0605   WBC 9.0 9.2 8.5 8.7   RBC 3.15* 3.15* 2.96* 3.01*   HGB 9.3* 9.5* 8.8* 9.0*   HCT 29.2* 29.3* 27.8* 29.0*   MCV 93 93 94 96   MCH 29.5 30.2 29.7 29.9   MCHC 31.8 32.4 31.7 31.0*   RDW 14.3 14.6 14.5 14.6   * 142* 118* 128*       INR:   Recent Labs   Lab 12/10/21  0647 12/09/21  0641 12/08/21  0604 12/07/21  0605   INR 2.18* 2.18* 2.52* 2.51*       Glucose:   Recent Labs   Lab 12/10/21  1952 12/10/21  1559 12/10/21  1222 12/10/21  0823 12/10/21  0647 12/10/21  0438   * 208* 136* 110* 203* 195*       Blood Gas: No lab results found in last 7 days.    Culture Data No results for input(s): CULT in the last 168 hours.    Virology Data:   Lab Results   Component Value Date    FLUAH1 Negative 11/22/2021    FLUAH3 Negative 11/22/2021    WV3510 Negative 11/22/2021    IFLUB Negative 11/22/2021    RSVA Negative 11/22/2021    RSVB Negative 11/22/2021    PIV1 Negative 11/22/2021    PIV2 Negative 11/22/2021    PIV3 Negative 11/22/2021    HMPV Negative 11/22/2021    HRVS Negative 11/22/2021    ADVBE Negative 11/22/2021    ADVC Negative 11/22/2021    ADVC Negative 11/12/2021     ADVC Negative 10/17/2021       Historical CMV results (last 3 of prior testing):  Lab Results   Component Value Date    CMVQNT Not Detected 11/22/2021    CMVQNT Not Detected 11/16/2021    CMVQNT Not Detected 11/12/2021     No results found for: CMVLOG    Urine Studies    Recent Labs   Lab Test 11/01/21  1336 10/25/21  1507   URINEPH 5.5 5.5   NITRITE Negative Negative   LEUKEST Negative Negative   WBCU 0 2       Most Recent [place] Pulmonary Function Testing (FVC/FEV1 only)  No results found for: 20002  No results found for: 20003  No results found for: 20015  No results found for: 20016    IMAGING    No results found for this or any previous visit (from the past 48 hour(s)).

## 2021-12-11 NOTE — PROGRESS NOTES
Neuro: A&Ox4.   Cardiac: Afebrile, VSS.   Respiratory: RA   GI/: Voiding spontaneously. No BM this shift.   Diet/appetite: Tolerating diet. Poor appetite Denies nausea   Activity: Up SBA  Pain: Denies   Skin: No new deficits noted.  Lines:PICC- Locked  Drains: PEG, tube feeds cycled 6p-6a.  No new complaints.Will continue to monitor and follow plan of care.

## 2021-12-11 NOTE — PLAN OF CARE
D: Admitted on 9/5/21 from OSH for acute on chronic respiratory failure 2/2 ILD exacerbation, now s/p BSLT on 10/16/21.  Prolonged vent wean s/p trach (10/29-12/8) and PEG/J tube (10/29).  Post-op course also complicated by encephalopathy and diffuse weakness, CVA, afib with RVR, BRENNAN, GI bleed, Candidemia/Candida empyema, and anxiety.  Code called 11/2 for bradycardia/asystole, progressive hypotensive, found to have significant GI bleed, EGD with adherent clot near PEG tube.  PMH significant for NSIP/ILD, bronchiectasis, moderate PH, RA, SALTY, chronic HSV infection, hypogammaglobulinemia, steroid-induced diabetes, hypothyroidism, PFO, HTN, HLD, duodenal anomaly, anxiety, and depression.      I: Monitored vitals and assessed pt status.   Running:IV Abx via PICC.    A: A0x4. VSS, on RA. NSR. Afebrile. Pain denied. UO WNL. Blood sugars Q4. TF via PEG 6p-6a @80ml/hr. Old trach site covered. Conner counts in progress, pt has not eaten anything po tonight. Plan for Rehab Sunday.    I/O this shift:  In: 680 [NG/GT:200]  Out: 375 [Urine:375]    Temp:  [97.5  F (36.4  C)-97.9  F (36.6  C)] 97.9  F (36.6  C)  Pulse:  [] 82  Resp:  [16-18] 18  BP: (117-134)/(79-85) 128/85  SpO2:  [92 %-97 %] 94 %      P: Continue to monitor Pt status and report changes to treatment team.

## 2021-12-11 NOTE — PROGRESS NOTES
Pulmonary Medicine  Cystic Fibrosis - Lung Transplant Team  Progress Note  2021       Patient: Edson Thornton  MRN: [id number]  : 1965 (age 56 year old)  Transplant: 10/16/2021 (Lung), POD#56  Admission date: 2021    Assessment & Plan:     Edson Thornton is a 56 year old male with a PMH significant for NSIP/ILD, bronchiectasis, moderate PH, RA, SALTY, chronic HSV infection, hypogammaglobulinemia, steroid-induced diabetes, hypothyroidism, PFO, HTN, HLD, duodenal anomaly, anxiety, and depression.  Admitted on 21 from OSH for acute on chronic respiratory failure 2/2 ILD exacerbation, now s/p BSLT on 10/16/21. Prolonged vent wean s/p trach (10/29-) and PEG/J tube (10/29). Post-op course also complicated by encephalopathy and diffuse weakness, acute to subacute CVA, afib with RVR, BRENNAN, GI bleed, Candidemia/Candida empyema, and anxiety. Code called  for bradycardia/asystole, progressive hypotension, found to have significant GI bleed, EGD with adherent clot near PEG tube site that was clipped. Improving clinically; possible discharge to ARU on Monday, pending bed availability.      Recommendations:  - Repeat DSA ordered  and follow [id number] ()  - Decrease tacrolimus for supra therapeutic level and recheck level on Tuesday,  (ordered)  - Consolidate labs (CMV, EBV and IgG) on 12/15  - Monthly blood Coccidioides Ag due  (ordered)  - Continue ceftaz and fluconazole courses through   - Next prednisone taper due   - If going to TCU: then CXR/CBC/BMP and Tac levels on Mon/Thursday  - Evaluate need for BiPAP after trach site has healed     S/p BSLT for ILD:  Acute hypoxic respiratory failure, Resolved:   Prolonged vent weaning s/p trach, Resolved:  Bilateral pleural effusions: Explant pathology with NSIP, no malignancy. Prolonged vent wean s/p trach and PEG/J tube placement with thoracic surgery 10/29. Code called  for bradycardia/asystole (required 1 round of  "CPR, no medications) then progressive hypotension, GI bleed as below.  S/p left chest tube placement (11/3-11/30) for pleural effusion/empyem s/p lytics 11/25-11/28 (CXR showing trapped left lung), right thoracentesis 11/27 (cultures negative). Chest CT (11/22) with new LLL cavitary lesion with multifocal GGO in BUL, new 1.8 cm fluid collection with surrounding fat stranding in the left axilla, decreased bilateral loculated pleural effusions, and similar small pericardial effusion. Hypoxia resolved 12/1. CXR (12/7) with stable effusions and RLL opacity. Trach decannulated 12/8. No new pulmonary complaints, today, feels \"tight\" across his chest (suspect related to incision); sating mid 90s on room air.   - Nebs: levalbuterol and Mucomyst TID (increased 12/8) d/t chest tightness and dyspnea with tenacious secretions  - Pulmonary toilet with chest physiotherapy BID, continue for now  - Volume management per primary team     Immunosuppression: s/p induction therapy with basiliximab 10/16 (and high dose IV steroid) and 10/20  - Tacrolimus 2.5 mg qBID (increased 12/6).  Goal level 8-12. Level today of 11.6 at 13 hours; decrease dose to 2.5 mg qAM/2.0 mg qPM and recheck a level on Tuesday, 12/14 (ordered)  - MMF 1000 mg BID (decreased 11/2 given GI bleed, AZA to be avoided given TPMT)  - Prednisolone 10 mg BID, next taper due 1/2/22 (not yet ordered)    Date AM dose (mg) PM dose (mg)   12/5/21 10 10   1/2/22 10 7.5   1/30/22 7.5 7.5   2/27/22 7.5 5   3/27/22 5 5   4/24/22 5 2.5      Prophylaxis:   - Bactrim for PJP ppx (held 11/2-11/5 d/t BRENNAN)  - Nystatin for oral candidiasis ppx, 6 month course  - See below for serologies and viral ppx:      Donor Recipient Intervention   CMV status Negative Negative None, CMV monthly    EBV status Positive Positive None, EBV monthly    HSV status N/A Positive S/p acyclovir POD #1-30 (recent infection history pre-txp)      Positive cross match:   Positive DSA: Note that he received two " doses of rituximab in June, which is likely contributing to cross match result.  DSA newly positive 11/29 with DQB5 (mfi 2522), decreased with f/u on 12/6  - Repeat DSA ordered 12/12     ID: Concern for possible Strongyloides exposure pre-transplant s/p ivermectin x 1 dose (9/17). Donor and recipient cultures NGTD. S/p IV ceftazidime/vancomycin for 48h per protocol and additional empiric ceftazidime 10/19-10/23 given recurrent fevers as below. Cryptococcal Ag negative 10/29. Reports some blood mixed in sputum 12/3, persisting intermittently.   - Bacterial (12/5) and fungal (12/4) sputum cultures NGTD  - Monthly blood Coccidioides Ag x12 months post-transplant per ID (urine and serum negative 11/17), due 12/17 (ordered)     Klebsiella pneumoniae:  Pseudomonas fluorescens/putida:   LLL cavity: Klebsiella initially noted trach sputum culture 11/10. Started on ceftriaxone 11/11, transitioned to ertapenem 11/12-11/13, and back to ceftriaxone 11/14.  Bronch culture 11/12 with Klebsiella and Pseudomonas fluorescens/putida (R-meropenem).  Chest CT 11/22 with LLL cavity as above, BAL with Klebsiella pneumoniae. Histo Ag 11/22, 11/23 and Blast Ag 11/22 negative. S/p Zosyn (11/15-11/23) and levofloxacin (11/23-12/7).   - ABX: ceftazidime (11/23-12/21) per transplant ID  - Transplant ID follow up scheduled 12/21 with repeat chest CT prior      Disseminated Candida: Noted on blood cultures 10/20 and 10/22.  BDG fungitell positive (399) on 10/20.  Respiratory cultures with persistent Candida albicans.  TC 10/23 without evidence of endocarditis.  Ophthalmology consult 10/24 with benign dilated fundoscopic exam.  Candida empyema also noted 10/25, chest tubes inadvertently removed by CVTS 10/28, left chest tube replaced by IR 11/3 as above with ongoing Candida on cultures (11/3, 11/18).  BDG fungitell positive (>500) and Aspergillus galactomannan negative 11/22.  - Fluconazole (10/26-12/21) per transplant ID      HSV: Chronic  intermittent active infection pre-transplant with recent HSV infection: crusted lesions throughout left side of jaw, s/p 10 day treatment course of ACV through 10/9.  HSV PCR blood negative 10/17.  S/p ACV ppx as above (started POD #1 instead of POD #8 given HSV history and location).     Hypogammaglobulinemia: IgG previously low at 364 (9/7).  Noted at 265 at time of transplant, s/p IVIG 10/21, repeat IgG (11/17) 198, s/p IVIG (with premedication) 11/18.  - Repeat IgG 12/15 (ordered)      SALTY: Noted pre-transplant. Home CPAP 6-12 cm H2O.  - Evaluate need for BiPAP after trach site has healed     Other relevant problems being managed by primary team:     Acute to subacute embolic CVA:   Encephalopathy and diffuse weakness, Improving: Stroke code 10/22 d/t limited movement of BLE, CT head with infarcts in bilateral cerebral hemispheres and left cerebella hemisphere (presumed embolic), no acute intracranial hemorrhage.  MRI 10/23 with multifocal subacute infarcts within both cerebral hemispheres and left cerebellum.  DDx include surgery v embolic v infectious.  Heparin drip started 10/23 (intermittently held with GI bleed as below).  Repeat stroke code 10/25 d/t marked decrease in responsiveness with sedation wean, pupil inequality, and absent gag reflex.  CTA head without obvious new pathology, MRI brain (with and without contrast) primarily revealing for infarct, low likelihood of PRES.  Ammonia normal.  VEEG per neuro with severe diffuse encephalopathy.  Ongoing improvement since 10/29, repeat head CT 11/2 (following code) without new acute intracranial abnormalities.  - AC management per primary team  - PT/OT      Afib with RVR: Noted 10/18, started on amiodarone drip and converted to SR, transitioned to PO 10/21, decreased 10/29.  Metoprolol and amiodarone discontinued 11/19 d/t intermittent bradycardia.  AC per primary team.      Right subclavian DVT: Seen on UE US from 11/4.  Heparin drip resumed 11/5,  "transitioned to warfarin 11/20 and in the setting of thrombocytopenia.  HIT negative 11/21.     Hypomagnesemia: Suppressed reabsorption 2/2 CNI.  Requiring intermittent IV and PO replacement.  - Mag-Ox 800 mg qAM / 400 mg qPM (increased 12/8)     We appreciate the excellent care provided by the [place] [id number] team.  Recommendations communicated via telephone and this note.  Will continue to follow along closely, please do not hesitate to call with any questions or concerns.    Angela Luna PA-C  CF/Transplant  [id number]    Subjective & Interval History:     No fever or chills, feels his breathing is \"tight\" but this is not new. Maybe his cough is slightly more productive, he is not sure. No new nausea or abdominal pain, having BMs.     Review of Systems:     Please see interval history, otherwise 10 point review is negative.     Physical Exam:     Vital signs:  Temp: 97.9  F (36.6  C) Temp src: Oral BP: 128/85 Pulse: 82   Resp: 18 SpO2: 94 % O2 Device: None (Room air) Oxygen Delivery: 2 LPM Height: 162.6 cm (5' 4.02\") Weight: 67.9 kg (149 lb 11.2 oz)  I/O:     Intake/Output Summary (Last 24 hours) at 12/9/2021 0832  Last data filed at 12/9/2021 0632  Gross per 24 hour   Intake 2120 ml   Output 2075 ml   Net 45 ml     Constitutional: Sleeping, but does answer questions  HEENT: Eyes with pink conjunctivae, anicteric.  Oral mucosa moist without lesions  PULM: Decreased, scattered expiratory wheezing on right that cleared with deep breaths  CV: Normal S1 and S2.  RRR.  No murmur, gallop, or rub.  No peripheral edema  ABD: NABS, soft, nontender, nondistended.  PEG/J site not visualized  MSK: Moves all extremities  NEURO: Sleeping, but does answer questions  SKIN: Warm, dry.  No rash on limited exam  PSYCH: Flat affect, calm    Lines, Drains, and Devices:  PICC Triple Lumen 11/04/21 Left Basilic Access. PICC okay to use. (Active)   Site Assessment WDL 12/08/21 6699   External Cath Length (cm) 1 cm 12/05/21 1027   Extremity " Circumference (cm) 28 cm 11/28/21 1038   Dressing Intervention Chlorhexidine patch;Transparent 12/08/21 2259   Dressing Change Due 12/12/21 12/08/21 2259   PICC Comment site check, CDI, SL, ecchymotic 12/08/21 1023   [place] - Status saline locked 12/08/21 2259   [place] - Cap Change Due 12/10/21 12/08/21 2259   Red - Status saline locked 12/08/21 2259   Red - Cap Change Due 12/10/21 12/08/21 2259   White - Status saline locked 12/08/21 2259   White - Cap Change Due 12/10/21 12/08/21 2259   Extravasation? No 12/08/21 2259   Line Necessity Yes, meets criteria 12/08/21 2259   Number of days: 35     Data:     LABS    CMP:   Recent Labs   Lab 12/11/21  0431 12/11/21  0025 12/10/21  1952 12/10/21  1559 12/10/21  0823 12/10/21  0647 12/09/21  1227 12/09/21  0641 12/08/21  0921 12/08/21  0604 12/07/21  0833 12/07/21  0605 12/06/21  0839 12/06/21  0554   NA  --   --   --   --   --  138  --  138  --  139  --  139  --  141   POTASSIUM  --   --   --   --   --  4.7  --  4.7  --  4.7  --  4.8  --  4.1   CHLORIDE  --   --   --   --   --  103  --  105  --  106  --  107  --  106   CO2  --   --   --   --   --  27  --  28  --  27  --  25  --  26   ANIONGAP  --   --   --   --   --  8  --  5  --  6  --  7  --  9   * 171* 172* 208*   < > 203*   < > 153*   < > 177*   < > 174*   < > 199*   BUN  --   --   --   --   --  30  --  28  --  35*  --  31*  --  23   CR  --   --   --   --   --  0.69  --  0.64*  --  0.70  --  0.68  --  0.65*   GFRESTIMATED  --   --   --   --   --  >90  --  >90  --  >90  --  >90  --  >90   ERIK  --   --   --   --   --  9.0  --  9.1  --  8.9  --  8.8  --  9.1   MAG  --   --   --   --   --  1.6  --   --   --  1.7  --  1.9  --  1.4*   PHOS  --   --   --   --   --  3.6  --   --   --  2.9  --  3.4  --  3.1   PROTTOTAL  --   --   --   --   --  5.9*  --   --   --  5.7*  --  5.8*  --  6.1*   ALBUMIN  --   --   --   --   --  2.5*  --   --   --  2.3*  --  2.3*  --  2.4*   BILITOTAL  --   --   --   --   --  0.5  --   --   --   0.2  --  <0.1*  --  0.2   ALKPHOS  --   --   --   --   --  105  --   --   --  107  --  106  --  114   AST  --   --   --   --   --  19  --   --   --  17  --  22  --  26   ALT  --   --   --   --   --  24  --   --   --  25  --  27  --  32    < > = values in this interval not displayed.     CBC:   Recent Labs   Lab 12/10/21  0647 12/09/21  0641 12/08/21  0604 12/07/21  0605   WBC 9.0 9.2 8.5 8.7   RBC 3.15* 3.15* 2.96* 3.01*   HGB 9.3* 9.5* 8.8* 9.0*   HCT 29.2* 29.3* 27.8* 29.0*   MCV 93 93 94 96   MCH 29.5 30.2 29.7 29.9   MCHC 31.8 32.4 31.7 31.0*   RDW 14.3 14.6 14.5 14.6   * 142* 118* 128*       INR:   Recent Labs   Lab 12/10/21  0647 12/09/21  0641 12/08/21  0604 12/07/21  0605   INR 2.18* 2.18* 2.52* 2.51*       Glucose:   Recent Labs   Lab 12/11/21  0431 12/11/21  0025 12/10/21  1952 12/10/21  1559 12/10/21  1222 12/10/21  0823   * 171* 172* 208* 136* 110*       Blood Gas: No lab results found in last 7 days.    Culture Data No results for input(s): CULT in the last 168 hours.    Virology Data:   Lab Results   Component Value Date    FLUAH1 Negative 11/22/2021    FLUAH3 Negative 11/22/2021    SW7508 Negative 11/22/2021    IFLUB Negative 11/22/2021    RSVA Negative 11/22/2021    RSVB Negative 11/22/2021    PIV1 Negative 11/22/2021    PIV2 Negative 11/22/2021    PIV3 Negative 11/22/2021    HMPV Negative 11/22/2021    HRVS Negative 11/22/2021    ADVBE Negative 11/22/2021    ADVC Negative 11/22/2021    ADVC Negative 11/12/2021    ADVC Negative 10/17/2021       Historical CMV results (last 3 of prior testing):  Lab Results   Component Value Date    CMVQNT Not Detected 11/22/2021    CMVQNT Not Detected 11/16/2021    CMVQNT Not Detected 11/12/2021     No results found for: CMVLOG    Urine Studies    Recent Labs   Lab Test 11/01/21  1336 10/25/21  1507   URINEPH 5.5 5.5   NITRITE Negative Negative   LEUKEST Negative Negative   WBCU 0 2       Most Recent [place] Pulmonary Function Testing (FVC/FEV1 only)  No  results found for: 20002  No results found for: 20003  No results found for: 20015  No results found for: 20016    IMAGING    No results found for this or any previous visit (from the past 48 hour(s)).

## 2021-12-11 NOTE — PLAN OF CARE
Shift summary [id number]  D: Pt is s/p bilateral lung transplant. Hospitalization course c/b numerous things(see MD note) Pt now ready for rehab.  A/I: Pt is alert and oriented.Pt up with SBA, Pt has been in SR/ST  other VSS. Pt sating well on RA, lungs noted to have some crackles in the LLL. Encouraged to use IS. Pt has PEG tube with intermittent TF at 80/hr from 6p to 6A. Pt's on reg diet appetite fair. Pt ate 1/2 of food brought in by SO. Pt's FSBG stable. Sliding scale coverage given when needed.Pt voiding. Pt has had BM. Pt denies any c/o pain. Pt receiving IV antbx.Pt triggered sepsis protocol. LA 1.7.Pt frustrated with extended hospitalization but anticipating rehab on Sunday.Pt has TL PICC.Pt has old trach site covered with dressing, CDI. Pt would like shower but was told couldn't d/t healing trach site will need to check with MD.  P: Anticipate discharge to rehab on Sunday.

## 2021-12-11 NOTE — PROGRESS NOTES
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 Post-op course complicated encephalopathy, and diffuse weakness, acute to subacute by CVA, afib with RVR, BRENNAN, candidemia/candida empyema, and Code Blue due to bradycardia/asystole and hypotension for significant GIB s/p EGD with adherent clot near PEG tube site s/p clips. Transferred from ICU to medicine on 11/23/2021.  Likely to be decannulated 12/8.  Ready for discharge to ARU; bed maybe ready Monday.     ILD and NSIP s/p BSLT on 10/16/2021  Acute hypoxic respiratory failure s/p tracheostomy on 10/29/21 and decannulation on 12/8/2021  Bilateral pleural effusions   - Transplant pulmonology consultation appreciated  - IS: s/p basiliximab on 10/16 and 10/20. Continue tacrolimus, MMF, prednisone per transplant pulmonary dosing  - Ppx: Continue bactrim 400-80 mg daily, nystatin QID x6 months  - Monthly Coccidioides no longer suggested by ID  - DSA every 2 weeks   - Levalbuterol and mucomyst QID and pulm toilet and chest PT QID  - PT/OT/SLP   - 40 mg lasix daily   - Oxycodone 5-10 mg Q4H prn  - Schedule tylenol     Left lung cavitary lesion   Suspect aspiration vs pseudomonal vs K pneumonia induced abscess. CT on 10/25 with LLL nodular opacity. Repeat CT chest on 11/22 with new cavitary lesion in the left lung base, and with multifocal bilateral upper lobe GGO (some of which are new), new 1.8 cm fluid collection with surrounding fat stranding in the left axilla, decreased  bilateral loculated pleural effusions. Indeterminate Quant Gold, but negative tuberculin skin tests on mulitple occurences. S/p BAL on 11/22. ID feels less likely fungal. B D glucan positive but has known candidemia. Cocci ag in urine negatie, serum histo negative, CRAG negative.  -Appreciate transplant infectious disease consultation  - ID recommends stopping zosyn (11/15-11/23), start Ceftazdime 2 grams Q8H (until 12/21) and levaquin 750 mg daily (until 12/07)  - Follow up CT chest in 4 weeks on 12/21  - Please include in the patient's discharge instructions, follow up with Dr. Abebe on 12/21/2021 @6:30 PM in a virtual visit.      Klebsiella pneumoniae and Pseudomonas fluorescens/putida HAP  Klebsiella initially noted trach sputum culture 11/10. Bronch culture 11/12 with Klebsiella and Pseudomonas fluorescens/putida (R-meropenem).     - Abx as above      Invasive candidiasis with Candida albicans  Positive candida BCx 10/20 and 10/22.  BDG fungitell positive (399) on 10/20. Sputum Cx + Candida albicans persistently.  TC 10/23 without evidence of endocarditis. Ophthalmology consult 10/24 with benign dilated fundoscopic exam.  Candida empyema also noted 10/25, chest tubes inadvertently removed by CVTS 10/28, left chest tube replaced by IR 11/3 as above with ongoing Candida on cultures. Repeat pleural fungal cultures and fungal/bacterial blood cultures on 11/18 NGTD. Transplant as noted above following   -Left chest tube removed 12/1  - Initially on Micafungin (10/22-10/27) transition to fluconazole 400 mg IV daily (start date 10/26 end date 12/21)     Hypogammaglobinemia  Received IVIG with plan to repeat IgG on 12/15.    Encephalopathy, resolved  Likely multi-factorial in the setting of stroke, prolonged critical illness.  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  MRI brain on 10/23 with multifocal subacute infarct within both cerebral hemispheres and left cerebellum. Presumed embolic with afib hx.   - Anticoagulation as noted below      Atrial fibrillation  [id number] 4 for HTN, CVA, and DM2. First noted on 10/18, started on amiodarone drip, and converted to NSR. Metoprolol and amidoarone discontinued on 11/20 due to intermittent bradycardia.   - Anticoagulation with warfarin as noted below  - Continue Rosuvastatin 10 mg daily      Right subclavian DVT   dx on 11/4. Resumed on heparin drip on 11/5 and transitioned to warfarin in setting of thrombocytopenia. HIT panel negative on 11/21.   - Continue warfarin per pharmacy protocol     DM2 with steroid induced hyperglycemia   Hemoglobin A1c 6.6 pre-operatively. Endocrine recently consulted for steroids induced hyperglycemia.   - Continue Lantus 30 mg BID  - Novolog High Sliding scale insulin Q4H  - BG Q4H      Diarrhea   C. Diff negative on 11/20. Likely 2/2 tube feeding formula.  - Monitor I&O      Severe malnutrition in the context of acute on chronic illness  Patient currently has a PEG J-tube.  -Multivitamin, folic acid, B6, B12 supplements   - TF per nutrition; continue oral diet as well  -Hopefully over the next couple of weeks we can decrease tube feeds and allow the patient to eat more food by mouth     Anxiety and depression   Psychiatry reconsulted and after discussion suggest increasing lexapro. Will discuss with patient regarding starting ritalin in the AM for motivation.  - Lexapro increased to 10 mg daily could increase to 20 in a week ~12/12  -As needed hydroxyzine    Concern for chipped tooth  Poor dental hygiene  Patient noted he feels like he may have chipped his tooth or has a loose tooth after eating guevara. It is not painful.   - Dental hygiene consult placed and chipped tooth stable rec oral hygiene cares     ------ Chronic stable medical problems ------     RA- Dx 5/2021 with + CCP ab and RF. Previously  treated with rituximab. Rheum consulted early in admission. On steroids as noted above.   SALTY - Uses CPAP at bedtime prior to admission. Will need to evaluate for BiPAP after decannulation   HTN- Continue PTA amlodipine 5 mg daily. PRN labetalol and hydralazine for goal SBP <140   Hypothyroidism - TSH 3.17 on 11/19/21. Continue PTA levothyroxine 25 mcg daily      ------ Resolved Hospital problems -------     Recurrent GIB - hgb drop on 10/22 s/p 2 unit pRBC transfusion with EGD on 10/23 with NJ/OG tube trauma with scant oozing. Patient then developed progressive hypotension and ultimately CODE BLUE for GIB in setting of anticoagulation with heparin drip, s/p MTP. EGD on 11/2 with large amount of clotted blood in stomach and area of raised mucosa with small adherent clot near PEG tube site that was clipped, no active bleeding.  Maroon stools 11/3, repeat EGD with extensive old blood in stomach, no active bleeding, small nodular area with prior clips clipped again.  Most recent EGD 11/5 with ulcer noted at PEG tube bumper site, gastritis, and suction marks from G tube. TF noted in G tube drainage bag 11/7, AXR with GJ tube tip projecting over proximal duodenum. GJ tube exchanged 11/9 by GI.  - PO PPI BID      HSV  Chronic intermittent active infection pre-transplant with recent HSV infection: crusted lesions throughout left side of jaw, s/p 10 day treatment course of ACV through 10/9.  HSV PCR blood negative 10/17.  S/p ACV ppx as above (started POD #1 instead of POD #8 given HSV history and location).     Hypernatremia, resolved  Due to inability to have oral intake. Resolved now that he is PO and able to access water.       Diet: Regular Diet Adult  Adult Formula Drip Feeding: Specified Time Vital 1.5; Jejunostomy; Goal Rate: 80 mL/hr x ~12 hrs (infuse 1 L daily); mL/hr; From: 6:00 PM; 6:00 AM; Medication - Feeding Tube Flush Frequency: See free water flush order. At least 15-30 mL water befo...  Calorie  Counts  Snacks/Supplements Adult: Ensure Clear; Between Meals  Snacks/Supplements Adult: Other; Gelatein (cherry or orange) @ supper; With Meals    DVT Prophylaxis: Heparin SQ  Watson Catheter: Not present  Central Lines: None  Code Status: Full Code      Disposition Plan   Expected discharge: medically ready to discharge to TCU recommended to transitional care unit once safe disposition plan/ TCU bed available.     The patient's care was discussed with the Patient and Pulm Consultant.    Gume Baldwin MD  Hospitalist Service  [facility]  Securely message with the Vocera Web Console (learn more here)  Text page via [facility] Paging/Directory    Please see sign in/sign out for up to date coverage information    Clinically Significant Risk Factors Present on Admission                ______________________________________________________________________    Interval History   Still has cough, not bothersome. Appetite poor. Denies pain. Ready for discharge.    Four point ROS completed and negative unless listed above    Data reviewed today: I reviewed all medications, new labs and imaging results over the last 24 hours.    Physical Exam   Vital Signs: Temp: 97.7  F (36.5  C) Temp src: Oral BP: (!) 128/90 Pulse: 82   Resp: 18 SpO2: 95 % O2 Device: None (Room air)    Weight: 149 lbs 11.2 oz  General Appearance: Patient is in no acute distress.  He appears comfortable sitting in bed.  He appears stated age.  Respiratory: Normal work of breathing.  Decannulated tracheostomy.  Cardiovascular: RRR, S1/S2,  GI: soft, nontender, nondistended  Skin: no jaundice  Neuro: Alert and oriented to person, place, time, situation.  Speech is normal.  Moves all extremities symmetrically and equally.    Data   Recent Labs   Lab 12/11/21  0819 12/11/21  0649 12/11/21  0431 12/11/21  0025 12/10/21  0823 12/10/21  0647 12/09/21  1227 12/09/21  0641 12/08/21  0921 12/08/21  0604   WBC  --   --   --   --    --  9.0  --  9.2  --  8.5   HGB  --   --   --   --   --  9.3*  --  9.5*  --  8.8*   MCV  --   --   --   --   --  93  --  93  --  94   PLT  --   --   --   --   --  141*  --  142*  --  118*   INR  --  2.24*  --   --   --  2.18*  --  2.18*  --  2.52*   NA  --   --   --   --   --  138  --  138  --  139   POTASSIUM  --  4.9  --   --   --  4.7  --  4.7  --  4.7   CHLORIDE  --   --   --   --   --  103  --  105  --  106   CO2  --   --   --   --   --  27  --  28  --  27   BUN  --   --   --   --   --  30  --  28  --  35*   CR  --   --   --   --   --  0.69  --  0.64*  --  0.70   ANIONGAP  --   --   --   --   --  8  --  5  --  6   ERIK  --   --   --   --   --  9.0  --  9.1  --  8.9   GLC 99  --  165* 171*   < > 203*   < > 153*   < > 177*   ALBUMIN  --   --   --   --   --  2.5*  --   --   --  2.3*   PROTTOTAL  --   --   --   --   --  5.9*  --   --   --  5.7*   BILITOTAL  --   --   --   --   --  0.5  --   --   --  0.2   ALKPHOS  --   --   --   --   --  105  --   --   --  107   ALT  --   --   --   --   --  24  --   --   --  25   AST  --   --   --   --   --  19  --   --   --  17    < > = values in this interval not displayed.     No results found for this or any previous visit (from the past 24 hour(s)).  Medications     dextrose Stopped (10/24/21 1008)     Warfarin Therapy Reminder         acetaminophen  975 mg Oral or Feeding Tube Q8H     acetylcysteine  2 mL Nebulization TID     amLODIPine  5 mg Oral Daily     calcium carbonate 600 mg-vitamin D 400 units  1 tablet Oral or Feeding Tube BID w/meals     cefTAZidime  2 g Intravenous Q8H     cyclobenzaprine  10 mg Oral At Bedtime     escitalopram  10 mg Oral or Feeding Tube Daily     fiber modular (NUTRISOURCE FIBER)  1 packet Per Feeding Tube BID     fluconazole  400 mg Intravenous Q24H     fluticasone  1 spray Both Nostrils Daily     folic acid-vit B6-vit B12  1 tablet Oral Daily     furosemide  40 mg Oral Daily     heparin lock flush  5-20 mL Intracatheter Q24H     insulin aspart   1-12 Units Subcutaneous Q4H     insulin glargine  30 Units Subcutaneous BID     levalbuterol  1.25 mg Nebulization TID     levothyroxine  25 mcg Oral Daily     lidocaine  2 patch Transdermal Q24H     lidocaine   Transdermal Q8H     magnesium oxide  800 mg Oral QAM    And     magnesium oxide  400 mg Oral QPM     melatonin  10 mg Oral QPM     menthol   Transdermal Q8H     multivitamin w/minerals  1 tablet Oral Daily     mycophenolate  1,000 mg Oral BID     nystatin  1,000,000 Units Swish & Swallow 4x Daily     pantoprazole  40 mg Oral BID AC     predniSONE  10 mg Oral or Feeding Tube BID     QUEtiapine  25 mg Oral or Feeding Tube BID     QUEtiapine  50 mg Oral At Bedtime     rosuvastatin  10 mg Oral or Feeding Tube Daily     sodium chloride (PF)  10-40 mL Intracatheter Q8H     sodium chloride (PF)  3 mL Intracatheter Q8H     sulfamethoxazole-trimethoprim  1 tablet Oral or Feeding Tube Daily     tacrolimus  2.5 mg Oral QAM     tacrolimus  2.5 mg Oral QPM

## 2021-12-11 NOTE — PROGRESS NOTES
Care Management Follow Up    Length of Stay (days): 97    Expected Discharge Date: 12/10/2021     Concerns to be Addressed:       Patient plan of care discussed at interdisciplinary rounds: Yes    Anticipated Discharge Disposition:   ARU *[id number]     Anticipated Discharge Services: therapies   Anticipated Discharge DME:      Patient/family educated on Medicare website which has current facility and service quality ratings:  yes  Education Provided on the Discharge Plan: yes   Patient/Family in Agreement with the Plan:  yes    Referrals Placed by CM/SW:    Private pay costs discussed: Not applicable    Additional Information:  SW spoke with  ARU liaison (Trina). No beds are available on Sunday as the person planned for discharge from ARU on Sunday, will not be discharging that day. There may be a bed available possibly Monday, 12/13/21.    W/C transport will need to be scheduled via [employer]  [facility] ([phone])    JOSE ANTONIO Giang, [facility]  Social Work Services/Care Management, Casual staff  [facility]      For weekend social work needs, contact information below and avail on Amcom:  4A, 4C, 4E, 5A, 5B pager [phone]  6A, 6B, 6C, 6D  pager [phone]  7A, 7B, 7C, 7D, 5C pager [phone]  on-call/after hours pager [phone]    weekend RN care coordinator pager [phone] (ID: [id number])  (home d/c with needs incl home care, assisted living facility returns, durable medical equip, IV antibiotics)

## 2021-12-12 ENCOUNTER — APPOINTMENT (OUTPATIENT)
Dept: PHYSICAL THERAPY | Facility: CLINIC | Age: 56
End: 2021-12-12
Attending: STUDENT IN AN ORGANIZED HEALTH CARE EDUCATION/TRAINING PROGRAM
Payer: COMMERCIAL

## 2021-12-12 LAB
ALBUMIN SERPL-MCNC: 2.5 G/DL (ref 3.4–5)
ALP SERPL-CCNC: 104 U/L (ref 40–150)
ALT SERPL W P-5'-P-CCNC: 26 U/L (ref 0–70)
ANION GAP SERPL CALCULATED.3IONS-SCNC: 7 MMOL/L (ref 3–14)
AST SERPL W P-5'-P-CCNC: 17 U/L (ref 0–45)
BILIRUB SERPL-MCNC: 0.2 MG/DL (ref 0.2–1.3)
BUN SERPL-MCNC: 34 MG/DL (ref 7–30)
CALCIUM SERPL-MCNC: 8.6 MG/DL (ref 8.5–10.1)
CHLORIDE BLD-SCNC: 108 MMOL/L (ref 94–109)
CO2 SERPL-SCNC: 26 MMOL/L (ref 20–32)
CREAT SERPL-MCNC: 1 MG/DL (ref 0.66–1.25)
ERYTHROCYTE [DISTWIDTH] IN BLOOD BY AUTOMATED COUNT: 14.2 % (ref 10–15)
GFR SERPL CREATININE-BSD FRML MDRD: 84 ML/MIN/1.73M2
GLUCOSE BLD-MCNC: 167 MG/DL (ref 70–99)
GLUCOSE BLDC GLUCOMTR-MCNC: 109 MG/DL (ref 70–99)
GLUCOSE BLDC GLUCOMTR-MCNC: 138 MG/DL (ref 70–99)
GLUCOSE BLDC GLUCOMTR-MCNC: 165 MG/DL (ref 70–99)
GLUCOSE BLDC GLUCOMTR-MCNC: 187 MG/DL (ref 70–99)
GLUCOSE BLDC GLUCOMTR-MCNC: 249 MG/DL (ref 70–99)
GLUCOSE BLDC GLUCOMTR-MCNC: 92 MG/DL (ref 70–99)
HCT VFR BLD AUTO: 30.6 % (ref 40–53)
HGB BLD-MCNC: 9.8 G/DL (ref 13.3–17.7)
INR PPP: 2.52 (ref 0.85–1.15)
MAGNESIUM SERPL-MCNC: 1.5 MG/DL (ref 1.6–2.3)
MCH RBC QN AUTO: 29.7 PG (ref 26.5–33)
MCHC RBC AUTO-ENTMCNC: 32 G/DL (ref 31.5–36.5)
MCV RBC AUTO: 93 FL (ref 78–100)
PHOSPHATE SERPL-MCNC: 3.7 MG/DL (ref 2.5–4.5)
PLATELET # BLD AUTO: 144 10E3/UL (ref 150–450)
POTASSIUM BLD-SCNC: 4.5 MMOL/L (ref 3.4–5.3)
PROT SERPL-MCNC: 5.8 G/DL (ref 6.8–8.8)
RBC # BLD AUTO: 3.3 10E6/UL (ref 4.4–5.9)
SODIUM SERPL-SCNC: 141 MMOL/L (ref 133–144)
WBC # BLD AUTO: 9.7 10E3/UL (ref 4–11)

## 2021-12-12 PROCEDURE — 250N000013 HC RX MED GY IP 250 OP 250 PS 637: Performed by: ANESTHESIOLOGY

## 2021-12-12 PROCEDURE — 250N000012 HC RX MED GY IP 250 OP 636 PS 637: Performed by: PHYSICIAN ASSISTANT

## 2021-12-12 PROCEDURE — 85027 COMPLETE CBC AUTOMATED: CPT | Performed by: STUDENT IN AN ORGANIZED HEALTH CARE EDUCATION/TRAINING PROGRAM

## 2021-12-12 PROCEDURE — 99232 SBSQ HOSP IP/OBS MODERATE 35: CPT | Performed by: STUDENT IN AN ORGANIZED HEALTH CARE EDUCATION/TRAINING PROGRAM

## 2021-12-12 PROCEDURE — 250N000013 HC RX MED GY IP 250 OP 250 PS 637: Performed by: STUDENT IN AN ORGANIZED HEALTH CARE EDUCATION/TRAINING PROGRAM

## 2021-12-12 PROCEDURE — 250N000011 HC RX IP 250 OP 636: Performed by: STUDENT IN AN ORGANIZED HEALTH CARE EDUCATION/TRAINING PROGRAM

## 2021-12-12 PROCEDURE — 82040 ASSAY OF SERUM ALBUMIN: CPT | Performed by: STUDENT IN AN ORGANIZED HEALTH CARE EDUCATION/TRAINING PROGRAM

## 2021-12-12 PROCEDURE — 86833 HLA CLASS II HIGH DEFIN QUAL: CPT | Performed by: STUDENT IN AN ORGANIZED HEALTH CARE EDUCATION/TRAINING PROGRAM

## 2021-12-12 PROCEDURE — 97530 THERAPEUTIC ACTIVITIES: CPT | Mod: GP

## 2021-12-12 PROCEDURE — 214N000001 HC R&B CCU UMMC

## 2021-12-12 PROCEDURE — 250N000012 HC RX MED GY IP 250 OP 636 PS 637: Performed by: STUDENT IN AN ORGANIZED HEALTH CARE EDUCATION/TRAINING PROGRAM

## 2021-12-12 PROCEDURE — 250N000009 HC RX 250: Performed by: NURSE PRACTITIONER

## 2021-12-12 PROCEDURE — 36592 COLLECT BLOOD FROM PICC: CPT | Performed by: STUDENT IN AN ORGANIZED HEALTH CARE EDUCATION/TRAINING PROGRAM

## 2021-12-12 PROCEDURE — 94667 MNPJ CHEST WALL 1ST: CPT

## 2021-12-12 PROCEDURE — 97116 GAIT TRAINING THERAPY: CPT | Mod: GP

## 2021-12-12 PROCEDURE — 94640 AIRWAY INHALATION TREATMENT: CPT | Mod: 76

## 2021-12-12 PROCEDURE — 94640 AIRWAY INHALATION TREATMENT: CPT

## 2021-12-12 PROCEDURE — 83735 ASSAY OF MAGNESIUM: CPT | Performed by: STUDENT IN AN ORGANIZED HEALTH CARE EDUCATION/TRAINING PROGRAM

## 2021-12-12 PROCEDURE — 999N000157 HC STATISTIC RCP TIME EA 10 MIN

## 2021-12-12 PROCEDURE — 85610 PROTHROMBIN TIME: CPT | Performed by: STUDENT IN AN ORGANIZED HEALTH CARE EDUCATION/TRAINING PROGRAM

## 2021-12-12 PROCEDURE — 250N000013 HC RX MED GY IP 250 OP 250 PS 637: Performed by: THORACIC SURGERY (CARDIOTHORACIC VASCULAR SURGERY)

## 2021-12-12 PROCEDURE — 250N000013 HC RX MED GY IP 250 OP 250 PS 637: Performed by: NURSE PRACTITIONER

## 2021-12-12 PROCEDURE — 250N000012 HC RX MED GY IP 250 OP 636 PS 637: Performed by: NURSE PRACTITIONER

## 2021-12-12 PROCEDURE — 84100 ASSAY OF PHOSPHORUS: CPT | Performed by: STUDENT IN AN ORGANIZED HEALTH CARE EDUCATION/TRAINING PROGRAM

## 2021-12-12 PROCEDURE — 86832 HLA CLASS I HIGH DEFIN QUAL: CPT | Performed by: STUDENT IN AN ORGANIZED HEALTH CARE EDUCATION/TRAINING PROGRAM

## 2021-12-12 PROCEDURE — 94668 MNPJ CHEST WALL SBSQ: CPT

## 2021-12-12 RX ORDER — CYCLOBENZAPRINE HCL 10 MG
10 TABLET ORAL AT BEDTIME
Status: ON HOLD | DISCHARGE
Start: 2021-12-12 | End: 2021-12-29

## 2021-12-12 RX ORDER — FLUTICASONE PROPIONATE 50 MCG
1 SPRAY, SUSPENSION (ML) NASAL DAILY
Status: ON HOLD | DISCHARGE
Start: 2021-12-13 | End: 2021-12-29

## 2021-12-12 RX ORDER — AMOXICILLIN 250 MG
1 CAPSULE ORAL
Status: ON HOLD | DISCHARGE
Start: 2021-12-12 | End: 2021-12-29

## 2021-12-12 RX ORDER — BISACODYL 10 MG
10 SUPPOSITORY, RECTAL RECTAL DAILY PRN
Status: ON HOLD | DISCHARGE
Start: 2021-12-12 | End: 2021-12-29

## 2021-12-12 RX ORDER — QUETIAPINE FUMARATE 50 MG/1
50 TABLET, FILM COATED ORAL AT BEDTIME
Status: ON HOLD | DISCHARGE
Start: 2021-12-12 | End: 2021-12-28

## 2021-12-12 RX ORDER — TACROLIMUS 1 MG/1
2 CAPSULE ORAL EVERY EVENING
Status: ON HOLD | DISCHARGE
Start: 2021-12-12 | End: 2021-12-29

## 2021-12-12 RX ORDER — QUETIAPINE FUMARATE 25 MG/1
25 TABLET, FILM COATED ORAL 3 TIMES DAILY PRN
Status: ON HOLD | DISCHARGE
Start: 2021-12-12 | End: 2021-12-28

## 2021-12-12 RX ORDER — ROSUVASTATIN CALCIUM 10 MG/1
10 TABLET, COATED ORAL DAILY
Status: ON HOLD | DISCHARGE
Start: 2021-12-13 | End: 2021-12-29

## 2021-12-12 RX ORDER — ZINC/PETROLATUM,WHITE
1 PASTE (GRAM) TOPICAL
Status: ON HOLD | DISCHARGE
Start: 2021-12-12 | End: 2021-12-29

## 2021-12-12 RX ORDER — LEVOTHYROXINE SODIUM 25 UG/1
25 TABLET ORAL DAILY
Status: ON HOLD | DISCHARGE
Start: 2021-12-13 | End: 2021-12-29

## 2021-12-12 RX ORDER — MAGNESIUM OXIDE 400 MG/1
400 TABLET ORAL EVERY EVENING
Status: ON HOLD | DISCHARGE
Start: 2021-12-12 | End: 2021-12-29

## 2021-12-12 RX ORDER — LEVALBUTEROL INHALATION SOLUTION 1.25 MG/3ML
1.25 SOLUTION RESPIRATORY (INHALATION) 3 TIMES DAILY
Qty: 90 ML | Status: ON HOLD | DISCHARGE
Start: 2021-12-12 | End: 2021-12-29

## 2021-12-12 RX ORDER — PHENOL 1.4 %
10 AEROSOL, SPRAY (ML) MUCOUS MEMBRANE EVERY EVENING
DISCHARGE
Start: 2021-12-12 | End: 2022-01-03

## 2021-12-12 RX ORDER — PREDNISONE 10 MG/1
10 TABLET ORAL 2 TIMES DAILY
Status: ON HOLD | DISCHARGE
Start: 2021-12-12 | End: 2021-12-29

## 2021-12-12 RX ORDER — HYDROXYZINE HYDROCHLORIDE 25 MG/1
25 TABLET, FILM COATED ORAL EVERY 6 HOURS PRN
Status: ON HOLD | DISCHARGE
Start: 2021-12-12 | End: 2021-12-28

## 2021-12-12 RX ORDER — WARFARIN SODIUM 3 MG/1
1.5 TABLET ORAL DAILY
Status: ON HOLD | DISCHARGE
Start: 2021-12-12 | End: 2021-12-29

## 2021-12-12 RX ORDER — SULFAMETHOXAZOLE AND TRIMETHOPRIM 400; 80 MG/1; MG/1
1 TABLET ORAL DAILY
Status: ON HOLD | DISCHARGE
Start: 2021-12-13 | End: 2021-12-29

## 2021-12-12 RX ORDER — LOPERAMIDE HCL 2 MG
2 CAPSULE ORAL 4 TIMES DAILY PRN
Status: ON HOLD | DISCHARGE
Start: 2021-12-12 | End: 2021-12-28

## 2021-12-12 RX ORDER — MYCOPHENOLATE MOFETIL 250 MG/1
1000 CAPSULE ORAL 2 TIMES DAILY
Status: ON HOLD | DISCHARGE
Start: 2021-12-12 | End: 2021-12-29

## 2021-12-12 RX ORDER — TACROLIMUS 0.5 MG/1
2.5 CAPSULE ORAL EVERY MORNING
Status: ON HOLD | DISCHARGE
Start: 2021-12-12 | End: 2021-12-29

## 2021-12-12 RX ORDER — LIDOCAINE 4 G/G
2 PATCH TOPICAL EVERY 24 HOURS
Status: ON HOLD | DISCHARGE
Start: 2021-12-12 | End: 2021-12-28

## 2021-12-12 RX ORDER — OXYMETAZOLINE HYDROCHLORIDE 0.05 G/100ML
2 SPRAY NASAL EVERY 4 HOURS PRN
Status: ON HOLD | DISCHARGE
Start: 2021-12-12 | End: 2021-12-28

## 2021-12-12 RX ORDER — LIDOCAINE HYDROCHLORIDE 20 MG/ML
5 SOLUTION OROPHARYNGEAL
Status: ON HOLD | DISCHARGE
Start: 2021-12-12 | End: 2021-12-28

## 2021-12-12 RX ORDER — CEFTAZIDIME 2 G/1
2 INJECTION, POWDER, FOR SOLUTION INTRAVENOUS EVERY 8 HOURS
Status: ON HOLD | DISCHARGE
Start: 2021-12-12 | End: 2021-12-28

## 2021-12-12 RX ORDER — ALBUTEROL SULFATE 0.83 MG/ML
2.5 SOLUTION RESPIRATORY (INHALATION)
Qty: 90 ML | Status: ON HOLD | DISCHARGE
Start: 2021-12-12 | End: 2021-12-29

## 2021-12-12 RX ORDER — ACETYLCYSTEINE 200 MG/ML
2 SOLUTION ORAL; RESPIRATORY (INHALATION) 3 TIMES DAILY
Status: ON HOLD | DISCHARGE
Start: 2021-12-12 | End: 2021-12-29

## 2021-12-12 RX ORDER — FUROSEMIDE 40 MG
40 TABLET ORAL DAILY
Status: ON HOLD | DISCHARGE
Start: 2021-12-13 | End: 2021-12-29

## 2021-12-12 RX ORDER — NYSTATIN 100000/ML
1000000 SUSPENSION, ORAL (FINAL DOSE FORM) ORAL 4 TIMES DAILY
Status: ON HOLD | DISCHARGE
Start: 2021-12-12 | End: 2021-12-29

## 2021-12-12 RX ORDER — PROCHLORPERAZINE MALEATE 10 MG
10 TABLET ORAL EVERY 6 HOURS PRN
Status: ON HOLD | DISCHARGE
Start: 2021-12-12 | End: 2021-12-28

## 2021-12-12 RX ORDER — MAGNESIUM OXIDE 400 MG/1
800 TABLET ORAL EVERY MORNING
Status: ON HOLD | DISCHARGE
Start: 2021-12-13 | End: 2021-12-29

## 2021-12-12 RX ORDER — OXYCODONE HYDROCHLORIDE 5 MG/1
5-10 TABLET ORAL EVERY 4 HOURS PRN
Qty: 30 TABLET | Refills: 0 | Status: SHIPPED | OUTPATIENT
Start: 2021-12-12 | End: 2021-12-12

## 2021-12-12 RX ORDER — FLUCONAZOLE 2 MG/ML
400 INJECTION, SOLUTION INTRAVENOUS EVERY 24 HOURS
Status: ON HOLD | DISCHARGE
Start: 2021-12-13 | End: 2021-12-29

## 2021-12-12 RX ORDER — ESCITALOPRAM OXALATE 10 MG/1
10 TABLET ORAL DAILY
Status: ON HOLD | DISCHARGE
Start: 2021-12-13 | End: 2021-12-29

## 2021-12-12 RX ORDER — PANTOPRAZOLE SODIUM 40 MG/1
40 TABLET, DELAYED RELEASE ORAL
Status: ON HOLD | DISCHARGE
Start: 2021-12-12 | End: 2021-12-29

## 2021-12-12 RX ORDER — MULTIPLE VITAMINS W/ MINERALS TAB 9MG-400MCG
1 TAB ORAL DAILY
Status: ON HOLD | DISCHARGE
Start: 2021-12-13 | End: 2021-12-29

## 2021-12-12 RX ORDER — ACETAMINOPHEN 325 MG/1
975 TABLET ORAL EVERY 8 HOURS
DISCHARGE
Start: 2021-12-12 | End: 2022-01-03

## 2021-12-12 RX ORDER — ONDANSETRON 4 MG/1
4 TABLET, ORALLY DISINTEGRATING ORAL EVERY 6 HOURS PRN
Status: ON HOLD | DISCHARGE
Start: 2021-12-12 | End: 2021-12-29

## 2021-12-12 RX ORDER — GUAR GUM
1 PACKET (EA) ORAL 2 TIMES DAILY
Status: ON HOLD | DISCHARGE
Start: 2021-12-12 | End: 2021-12-28

## 2021-12-12 RX ORDER — OXYCODONE HYDROCHLORIDE 5 MG/1
5-10 TABLET ORAL EVERY 4 HOURS PRN
Qty: 30 TABLET | Refills: 0 | Status: ON HOLD | OUTPATIENT
Start: 2021-12-12 | End: 2021-12-28

## 2021-12-12 RX ORDER — QUETIAPINE FUMARATE 25 MG/1
25 TABLET, FILM COATED ORAL 2 TIMES DAILY
Status: ON HOLD | DISCHARGE
Start: 2021-12-12 | End: 2021-12-28

## 2021-12-12 RX ADMIN — Medication 1 TABLET: at 09:36

## 2021-12-12 RX ADMIN — FLUTICASONE PROPIONATE 1 SPRAY: 50 SPRAY, METERED NASAL at 09:31

## 2021-12-12 RX ADMIN — Medication 1.5 MG: at 18:26

## 2021-12-12 RX ADMIN — INSULIN GLARGINE 30 UNITS: 100 INJECTION, SOLUTION SUBCUTANEOUS at 09:28

## 2021-12-12 RX ADMIN — ACETAMINOPHEN 975 MG: 325 TABLET, FILM COATED ORAL at 06:16

## 2021-12-12 RX ADMIN — HYDROXYZINE HYDROCHLORIDE 50 MG: 50 TABLET, FILM COATED ORAL at 14:30

## 2021-12-12 RX ADMIN — INSULIN ASPART 2 UNITS: 100 INJECTION, SOLUTION INTRAVENOUS; SUBCUTANEOUS at 00:21

## 2021-12-12 RX ADMIN — Medication 1 PACKET: at 20:32

## 2021-12-12 RX ADMIN — CALCIUM CARBONATE 600 MG (1,500 MG)-VITAMIN D3 400 UNIT TABLET 1 TABLET: at 09:23

## 2021-12-12 RX ADMIN — Medication 800 MG: at 09:28

## 2021-12-12 RX ADMIN — Medication 1 TABLET: at 09:23

## 2021-12-12 RX ADMIN — QUETIAPINE FUMARATE 25 MG: 25 TABLET ORAL at 14:29

## 2021-12-12 RX ADMIN — CEFTAZIDIME 2 G: 2 INJECTION, POWDER, FOR SOLUTION INTRAVENOUS at 20:52

## 2021-12-12 RX ADMIN — ROSUVASTATIN CALCIUM 10 MG: 10 TABLET, FILM COATED ORAL at 09:23

## 2021-12-12 RX ADMIN — ACETAMINOPHEN 975 MG: 325 TABLET, FILM COATED ORAL at 21:52

## 2021-12-12 RX ADMIN — MYCOPHENOLATE MOFETIL 1000 MG: 250 CAPSULE ORAL at 20:31

## 2021-12-12 RX ADMIN — NYSTATIN 1000000 UNITS: 500000 SUSPENSION ORAL at 20:31

## 2021-12-12 RX ADMIN — SODIUM CHLORIDE, PRESERVATIVE FREE 15 ML: 5 INJECTION INTRAVENOUS at 20:32

## 2021-12-12 RX ADMIN — NYSTATIN 1000000 UNITS: 500000 SUSPENSION ORAL at 11:25

## 2021-12-12 RX ADMIN — Medication 1 PACKET: at 09:36

## 2021-12-12 RX ADMIN — Medication 400 MG: at 18:25

## 2021-12-12 RX ADMIN — FLUCONAZOLE IN SODIUM CHLORIDE 400 MG: 2 INJECTION, SOLUTION INTRAVENOUS at 09:20

## 2021-12-12 RX ADMIN — HYDROXYZINE HYDROCHLORIDE 50 MG: 50 TABLET, FILM COATED ORAL at 06:23

## 2021-12-12 RX ADMIN — PANTOPRAZOLE SODIUM 40 MG: 40 TABLET, DELAYED RELEASE ORAL at 09:23

## 2021-12-12 RX ADMIN — FUROSEMIDE 40 MG: 40 TABLET ORAL at 09:23

## 2021-12-12 RX ADMIN — CEFTAZIDIME 2 G: 2 INJECTION, POWDER, FOR SOLUTION INTRAVENOUS at 11:25

## 2021-12-12 RX ADMIN — INSULIN ASPART 2 UNITS: 100 INJECTION, SOLUTION INTRAVENOUS; SUBCUTANEOUS at 04:39

## 2021-12-12 RX ADMIN — MYCOPHENOLATE MOFETIL 1000 MG: 250 CAPSULE ORAL at 09:23

## 2021-12-12 RX ADMIN — PREDNISONE 10 MG: 10 TABLET ORAL at 09:28

## 2021-12-12 RX ADMIN — ACETAMINOPHEN 975 MG: 325 TABLET, FILM COATED ORAL at 14:34

## 2021-12-12 RX ADMIN — AMLODIPINE BESYLATE 5 MG: 2.5 TABLET ORAL at 09:23

## 2021-12-12 RX ADMIN — PREDNISONE 10 MG: 10 TABLET ORAL at 20:31

## 2021-12-12 RX ADMIN — LEVALBUTEROL HYDROCHLORIDE 1.25 MG: 1.25 SOLUTION RESPIRATORY (INHALATION) at 21:15

## 2021-12-12 RX ADMIN — ESCITALOPRAM OXALATE 10 MG: 10 TABLET ORAL at 09:23

## 2021-12-12 RX ADMIN — CEFTAZIDIME 2 G: 2 INJECTION, POWDER, FOR SOLUTION INTRAVENOUS at 04:30

## 2021-12-12 RX ADMIN — QUETIAPINE FUMARATE 25 MG: 25 TABLET ORAL at 09:23

## 2021-12-12 RX ADMIN — Medication 10 MG: at 20:31

## 2021-12-12 RX ADMIN — QUETIAPINE FUMARATE 50 MG: 50 TABLET ORAL at 20:31

## 2021-12-12 RX ADMIN — NYSTATIN 1000000 UNITS: 500000 SUSPENSION ORAL at 09:24

## 2021-12-12 RX ADMIN — CALCIUM CARBONATE 600 MG (1,500 MG)-VITAMIN D3 400 UNIT TABLET 1 TABLET: at 18:24

## 2021-12-12 RX ADMIN — NYSTATIN 1000000 UNITS: 500000 SUSPENSION ORAL at 16:17

## 2021-12-12 RX ADMIN — PANTOPRAZOLE SODIUM 40 MG: 40 TABLET, DELAYED RELEASE ORAL at 16:17

## 2021-12-12 RX ADMIN — ACETYLCYSTEINE 2 ML: 200 SOLUTION ORAL; RESPIRATORY (INHALATION) at 08:28

## 2021-12-12 RX ADMIN — INSULIN GLARGINE 30 UNITS: 100 INJECTION, SOLUTION SUBCUTANEOUS at 20:52

## 2021-12-12 RX ADMIN — SULFAMETHOXAZOLE AND TRIMETHOPRIM 1 TABLET: 400; 80 TABLET ORAL at 09:23

## 2021-12-12 RX ADMIN — LEVOTHYROXINE SODIUM 25 MCG: 0.03 TABLET ORAL at 09:23

## 2021-12-12 RX ADMIN — ACETYLCYSTEINE 2 ML: 200 SOLUTION ORAL; RESPIRATORY (INHALATION) at 21:15

## 2021-12-12 RX ADMIN — TACROLIMUS 2 MG: 1 CAPSULE ORAL at 18:24

## 2021-12-12 RX ADMIN — TACROLIMUS 2.5 MG: 1 CAPSULE ORAL at 09:23

## 2021-12-12 RX ADMIN — LEVALBUTEROL HYDROCHLORIDE 1.25 MG: 1.25 SOLUTION RESPIRATORY (INHALATION) at 08:28

## 2021-12-12 RX ADMIN — CYCLOBENZAPRINE HYDROCHLORIDE 10 MG: 5 TABLET, FILM COATED ORAL at 21:52

## 2021-12-12 ASSESSMENT — ACTIVITIES OF DAILY LIVING (ADL)
ADLS_ACUITY_SCORE: 17
ADLS_ACUITY_SCORE: 15
ADLS_ACUITY_SCORE: 15
ADLS_ACUITY_SCORE: 17
ADLS_ACUITY_SCORE: 17
ADLS_ACUITY_SCORE: 15
ADLS_ACUITY_SCORE: 17
ADLS_ACUITY_SCORE: 17
ADLS_ACUITY_SCORE: 15
ADLS_ACUITY_SCORE: 17
ADLS_ACUITY_SCORE: 15
ADLS_ACUITY_SCORE: 17
ADLS_ACUITY_SCORE: 17
ADLS_ACUITY_SCORE: 15
ADLS_ACUITY_SCORE: 17
ADLS_ACUITY_SCORE: 17
ADLS_ACUITY_SCORE: 15
ADLS_ACUITY_SCORE: 17
ADLS_ACUITY_SCORE: 17

## 2021-12-12 ASSESSMENT — MIFFLIN-ST. JEOR: SCORE: 1411.21

## 2021-12-12 NOTE — PROGRESS NOTES
Calorie Count  Intake recorded for: 12/11  Total Kcals: 0 Total Protein: 0g  Kcals from Hospital Food: 0   Protein: 0g  Kcals from Outside Food (average):0 Protein: 0g  # Meals Ordered from Kitchen: 0  # Meals Recorded: No food intake recorded  # Supplements Recorded: No intake recorded

## 2021-12-12 NOTE — PROGRESS NOTES
Care Management Discharge Note    Discharge Date: 12/13/21 at 1200 via [facility]     Discharge Disposition: [facility]  Ph: [phone]    Discharge Services: None    Discharge DME: None    Discharge Transportation: [facility] ([phone])    Private pay costs discussed: N/A    PAS Confirmation Code: N/A, going to ARU  Patient/family educated on Medicare website which has current facility and service quality ratings: yes    Education Provided on the Discharge Plan:  yes  Persons Notified of Discharge Plans: Patient, Dr. Nicolasa Zavaleta, Nursing Staff, ARU Staff, Transportation  Patient/Family in Agreement with the Plan: yes    Handoff Referral Completed: SW will complete on 12/13    Additional Information:  Staff: Bedside RN please call ARU to complete nurse to nurse. MD aware that orders and discharge summary need to be completed prior to discharge.     SW: Weekday SW to send external hand off.      JOSE ANTONIO Gruber, GENIA  Weekend  on 12/12/21  [facility]  Pager: [phone]    For weekend social work needs, contact information below and available on Amcom:  4A, 4C, 4E, 5A, 5B  pager [phone]  6A, 6B, 6C, 6D  pager [phone]  7A, 7B, 7C, 7D, 5C  pager [phone]    For weekend RN care coordinator needs (home discharge with needs including home care, assisted living facility returns, durable medical equip, IV antibiotics) - page [phone].

## 2021-12-12 NOTE — PLAN OF CARE
Code status: Full     Team: gold 10  PRN: atarax x 1    Neuro: Ao*4, anxious  Cardiac/Tele:  SR  Respiratory: Room air, MYERS with activity  GI/:  urinating via urinal/toliet, large soft BM x 1  Endocrine: ACHS, no insulin given  Skin: Old chest tube incision and clamshell healing  LDAs: triple lumen PICC  Activity: SBA  Pain: denies     Plan: discharge tomorrow afternoon    Nazanin Mccullough, RN  Time cared for 0700 - 1530

## 2021-12-12 NOTE — PLAN OF CARE
[id number] shift summary    D:Pt is s/p bilateral lung transplant. Hospitalization c/b multiple issues(see MD notes)  A/I: Pt has been very sleepy this shift. Pt did not want to eat. FSBG WNL. Pt's VSS. Pt has denied any c/o pain. Pt continues on cycled TF at 80/hr with 100 ml h20 flushes Q 4 hrs.Pt did get up around 2000 and agreed to take a shower with help. Pt ambulated from bed to toilet then rested about 10 min and went from toilet to shower. Pt washed with technicare, hair shampoo'd. Showered lasted approximately 20 min. Pt then ambulated from shower to bathroom door where chair placed and pt sat and rested approximately 10 min then went from chair to bed. Pt becomes very SOB and tachypnea. Pt takes a short while to recover. Sats maintain. Pt's old trach site cleansed along with PEG site. Pt's old CT sites reddened and scabbed but KIM. Pt's penis swollen/edematous.  P: Continue current POC. Possible TCU Monday if bed available

## 2021-12-12 NOTE — PHARMACY-TRANSPLANT NOTE
Solid Organ Transplant Recipient Prior to Discharge Note    56 year old male s/p BSLT transplant on 10/16/21.    Pharmacy has monitored for medication interactions and immunosuppression levels in conjunction with the multidisciplinary team. In anticipation for discharge, medication therapy needs have been addressed daily throughout the current admission via multidisciplinary rounds and/or discussions, order verification, daily clinical pharmacy review, and communication with prescribers.  Gigi Gutiérrez, Pharm.D, BCPS

## 2021-12-12 NOTE — PLAN OF CARE
Pt admitted 9/5 from OSH for acute on chronic respiratory failure 2/2 ILD exacerbation, now s/p BSLT on 10/16/21 c/b prolonged vent wean s/p trach (removed 12/8) and PEG/J tube (10/29). Other complications include encephalopathey, CVA, AFib with RVR, BRENNAN, GIB, candidemia/candida empyema, and anxiety. PMH includes ILD, bronchiectasis, pulm HTN, RA, SALTY, chronic HSV infection, hypogammaglobulinemia, steroid-induced DM, hypothyroidism, PFO, HTN, HLD, duodenal anomaly, and depression.     Neuro: A&O x 4. Pleasant affect. Calls appropriately. Slept well overnight. Gave PRN hydroxyzine 1x for episode of anxiety with SOB and diaphoresis.  Cardiac: SR/ST 90s-100s. SBP 110s-130s. Denies dizziness, chest pain, or palpitations  Respiratory: Sating well on RA. LS clear. MYERS.   GI/: Good UOP. Last BM 12/11. PEG/J tube clamped.   Endocrine: Q4H BG checks overnight with TF. Gave insulin 2x.   Diet/Appetite: Regular. Low appetite. Cycled TF off at 0600.   Skin: Trach site dressing CDI.  LDA: L TL PICC HL.  Activity: SBA.   Pain: Denies.     Plan: Possible discharge to TCU tomorrow. Continue to monitor and alert team to any changes or concerns.

## 2021-12-12 NOTE — PROGRESS NOTES
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 Post-op course complicated encephalopathy, and diffuse weakness, acute to subacute by CVA, afib with RVR, BRENNAN, candidemia/candida empyema, and Code Blue due to bradycardia/asystole and hypotension for significant GIB s/p EGD with adherent clot near PEG tube site s/p clips. Transferred from ICU to medicine on 11/23/2021.  Likely to be decannulated 12/8.  Ready for discharge to ARU; bed maybe ready Monday.     ILD and NSIP s/p BSLT on 10/16/2021  Acute hypoxic respiratory failure s/p tracheostomy on 10/29/21 and decannulation on 12/8/2021  Bilateral pleural effusions   - Transplant pulmonology consultation appreciated  - IS: s/p basiliximab on 10/16 and 10/20. Continue tacrolimus, MMF, prednisone per transplant pulmonary dosing  - Ppx: Continue bactrim 400-80 mg daily, nystatin QID x6 months  - Monthly Coccidioides no longer suggested by ID  - DSA every 2 weeks   - Levalbuterol and mucomyst QID and pulm toilet and chest PT QID  - PT/OT/SLP   - 40 mg lasix daily   - Oxycodone 5-10 mg Q4H prn  - Schedule tylenol     Left lung cavitary lesion   Suspect aspiration vs pseudomonal vs K pneumonia induced abscess. CT on 10/25 with LLL nodular opacity. Repeat CT chest on 11/22 with new cavitary lesion in the left lung base, and with multifocal bilateral upper lobe GGO (some of which are new), new 1.8 cm fluid collection with surrounding fat stranding in the left axilla, decreased  bilateral loculated pleural effusions. Indeterminate Quant Gold, but negative tuberculin skin tests on mulitple occurences. S/p BAL on 11/22. ID feels less likely fungal. B D glucan positive but has known candidemia. Cocci ag in urine negatie, serum histo negative, CRAG negative.  -Appreciate transplant infectious disease consultation  - ID recommends stopping zosyn (11/15-11/23), start Ceftazdime 2 grams Q8H (until 12/21) and levaquin 750 mg daily (until 12/07)  - Follow up CT chest in 4 weeks on 12/21  - Please include in the patient's discharge instructions, follow up with Dr. Abebe on 12/21/2021 @6:30 PM in a virtual visit.      Klebsiella pneumoniae and Pseudomonas fluorescens/putida HAP  Klebsiella initially noted trach sputum culture 11/10. Bronch culture 11/12 with Klebsiella and Pseudomonas fluorescens/putida (R-meropenem).     - Abx as above      Invasive candidiasis with Candida albicans  Positive candida BCx 10/20 and 10/22.  BDG fungitell positive (399) on 10/20. Sputum Cx + Candida albicans persistently.  TC 10/23 without evidence of endocarditis. Ophthalmology consult 10/24 with benign dilated fundoscopic exam.  Candida empyema also noted 10/25, chest tubes inadvertently removed by CVTS 10/28, left chest tube replaced by IR 11/3 as above with ongoing Candida on cultures. Repeat pleural fungal cultures and fungal/bacterial blood cultures on 11/18 NGTD. Transplant as noted above following   -Left chest tube removed 12/1  - Initially on Micafungin (10/22-10/27) transition to fluconazole 400 mg IV daily (start date 10/26 end date 12/21)     Hypogammaglobinemia  Received IVIG with plan to repeat IgG on 12/15.    Encephalopathy, resolved  Likely multi-factorial in the setting of stroke, prolonged critical illness.  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  MRI brain on 10/23 with multifocal subacute infarct within both cerebral hemispheres and left cerebellum. Presumed embolic with afib hx.   - Anticoagulation as noted below      Atrial fibrillation  [id number] for HTN, CVA, and DM2. First noted on 10/18, started on amiodarone drip, and converted to NSR. Metoprolol and amidoarone discontinued on 11/20 due to intermittent bradycardia.   - Anticoagulation with warfarin as noted below  - Continue Rosuvastatin 10 mg daily      Right subclavian DVT   dx on 11/4. Resumed on heparin drip on 11/5 and transitioned to warfarin in setting of thrombocytopenia. HIT panel negative on 11/21.   - Continue warfarin per pharmacy protocol     DM2 with steroid induced hyperglycemia   Hemoglobin A1c 6.6 pre-operatively. Endocrine recently consulted for steroids induced hyperglycemia.   - Continue Lantus 30 mg BID  - Novolog High Sliding scale insulin Q4H  - BG Q4H      Diarrhea   C. Diff negative on 11/20. Likely 2/2 tube feeding formula.  - Monitor I&O      Severe malnutrition in the context of acute on chronic illness  Patient currently has a PEG J-tube.  -Multivitamin, folic acid, B6, B12 supplements   - TF per nutrition; continue oral diet as well  -Hopefully over the next couple of weeks we can decrease tube feeds and allow the patient to eat more food by mouth     Anxiety and depression   Psychiatry reconsulted and after discussion suggest increasing lexapro. Will discuss with patient regarding starting ritalin in the AM for motivation.  - Lexapro increased to 10 mg daily could increase to 20 in a week ~12/12  -As needed hydroxyzine    Concern for chipped tooth  Poor dental hygiene  Patient noted he feels like he may have chipped his tooth or has a loose tooth after eating guevara. It is not painful.   - Dental hygiene consult placed and chipped tooth stable rec oral hygiene cares     ------ Chronic stable medical problems ------     RA- Dx 5/2021 with + CCP ab and RF. Previously  treated with rituximab. Rheum consulted early in admission. On steroids as noted above.   SALTY - Uses CPAP at bedtime prior to admission. Will need to evaluate for BiPAP after decannulation   HTN- Continue PTA amlodipine 5 mg daily. PRN labetalol and hydralazine for goal SBP <140   Hypothyroidism - TSH 3.17 on 11/19/21. Continue PTA levothyroxine 25 mcg daily      ------ Resolved Hospital problems -------     Recurrent GIB - hgb drop on 10/22 s/p 2 unit pRBC transfusion with EGD on 10/23 with NJ/OG tube trauma with scant oozing. Patient then developed progressive hypotension and ultimately CODE BLUE for GIB in setting of anticoagulation with heparin drip, s/p MTP. EGD on 11/2 with large amount of clotted blood in stomach and area of raised mucosa with small adherent clot near PEG tube site that was clipped, no active bleeding.  Maroon stools 11/3, repeat EGD with extensive old blood in stomach, no active bleeding, small nodular area with prior clips clipped again.  Most recent EGD 11/5 with ulcer noted at PEG tube bumper site, gastritis, and suction marks from G tube. TF noted in G tube drainage bag 11/7, AXR with GJ tube tip projecting over proximal duodenum. GJ tube exchanged 11/9 by GI.  - PO PPI BID      HSV  Chronic intermittent active infection pre-transplant with recent HSV infection: crusted lesions throughout left side of jaw, s/p 10 day treatment course of ACV through 10/9.  HSV PCR blood negative 10/17.  S/p ACV ppx as above (started POD #1 instead of POD #8 given HSV history and location).     Hypernatremia, resolved  Due to inability to have oral intake. Resolved now that he is PO and able to access water.       Diet: Regular Diet Adult  Adult Formula Drip Feeding: Specified Time Vital 1.5; Jejunostomy; Goal Rate: 80 mL/hr x ~12 hrs (infuse 1 L daily); mL/hr; From: 6:00 PM; 6:00 AM; Medication - Feeding Tube Flush Frequency: See free water flush order. At least 15-30 mL water befo...  Calorie  Counts  Snacks/Supplements Adult: Ensure Clear; Between Meals  Snacks/Supplements Adult: Other; Gelatein (cherry or orange) @ supper; With Meals  Diet  Adult Formula Tube Feeding    DVT Prophylaxis: Heparin SQ  Watson Catheter: Not present  Central Lines: None  Code Status: Full Code      Disposition Plan   Expected discharge: medically ready to discharge to TCU recommended to transitional care unit once safe disposition plan/ TCU bed available.     The patient's care was discussed with the Patient and Pulm Consultant.    Gume Baldwin MD  Hospitalist Service  [facility]  Securely message with the Vocera Web Console (learn more here)  Text page via [employer] Paging/Directory    Please see sign in/sign out for up to date coverage information    Clinically Significant Risk Factors Present on Admission                ______________________________________________________________________    Interval History   Feeling ok. Breathing stable. Appetite not great.     Four point ROS completed and negative unless listed above    Data reviewed today: I reviewed all medications, new labs and imaging results over the last 24 hours.    Physical Exam   Vital Signs: Temp: 98  F (36.7  C) Temp src: Oral BP: 138/86 Pulse: 107   Resp: 20 SpO2: 96 % O2 Device: None (Room air)    Weight: 147 lbs 11.2 oz  General Appearance: Patient is in no acute distress.  He appears comfortable sitting in bed.  He appears stated age.  Respiratory: Normal work of breathing.  Decannulated tracheostomy.  Cardiovascular: RRR, S1/S2,  GI: soft, nontender, nondistended  Skin: no jaundice  Neuro: Alert and oriented to person, place, time, situation.  Speech is normal.  Moves all extremities symmetrically and equally.    Data   Recent Labs   Lab 12/12/21  1146 12/12/21  0933 12/12/21  0519 12/11/21  0819 12/11/21  0649 12/10/21  0823 12/10/21  0647 12/09/21  1227 12/09/21  0641   WBC  --   --  9.7  --   --   --  9.0  --   9.2   HGB  --   --  9.8*  --   --   --  9.3*  --  9.5*   MCV  --   --  93  --   --   --  93  --  93   PLT  --   --  144*  --   --   --  141*  --  142*   INR  --   --  2.52*  --  2.24*  --  2.18*  --  2.18*   NA  --   --  141  --   --   --  138  --  138   POTASSIUM  --   --  4.5  --  4.9  --  4.7  --  4.7   CHLORIDE  --   --  108  --   --   --  103  --  105   CO2  --   --  26  --   --   --  27  --  28   BUN  --   --  34*  --   --   --  30  --  28   CR  --   --  1.00  --   --   --  0.69  --  0.64*   ANIONGAP  --   --  7  --   --   --  8  --  5   ERIK  --   --  8.6  --   --   --  9.0  --  9.1   * 92 167*   < >  --    < > 203*   < > 153*   ALBUMIN  --   --  2.5*  --   --   --  2.5*  --   --    PROTTOTAL  --   --  5.8*  --   --   --  5.9*  --   --    BILITOTAL  --   --  0.2  --   --   --  0.5  --   --    ALKPHOS  --   --  104  --   --   --  105  --   --    ALT  --   --  26  --   --   --  24  --   --    AST  --   --  17  --   --   --  19  --   --     < > = values in this interval not displayed.     No results found for this or any previous visit (from the past 24 hour(s)).  Medications     dextrose Stopped (10/24/21 1008)     Warfarin Therapy Reminder         acetaminophen  975 mg Oral or Feeding Tube Q8H     acetylcysteine  2 mL Nebulization TID     amLODIPine  5 mg Oral Daily     calcium carbonate 600 mg-vitamin D 400 units  1 tablet Oral or Feeding Tube BID w/meals     cefTAZidime  2 g Intravenous Q8H     cyclobenzaprine  10 mg Oral At Bedtime     escitalopram  10 mg Oral or Feeding Tube Daily     fiber modular (NUTRISOURCE FIBER)  1 packet Per Feeding Tube BID     fluconazole  400 mg Intravenous Q24H     fluticasone  1 spray Both Nostrils Daily     folic acid-vit B6-vit B12  1 tablet Oral Daily     furosemide  40 mg Oral Daily     heparin lock flush  5-20 mL Intracatheter Q24H     insulin aspart  1-12 Units Subcutaneous Q4H     insulin glargine  30 Units Subcutaneous BID     levalbuterol  1.25 mg Nebulization  TID     levothyroxine  25 mcg Oral Daily     lidocaine  2 patch Transdermal Q24H     lidocaine   Transdermal Q8H     magnesium oxide  800 mg Oral QAM    And     magnesium oxide  400 mg Oral QPM     melatonin  10 mg Oral QPM     menthol   Transdermal Q8H     multivitamin w/minerals  1 tablet Oral Daily     mycophenolate  1,000 mg Oral BID     nystatin  1,000,000 Units Swish & Swallow 4x Daily     pantoprazole  40 mg Oral BID AC     predniSONE  10 mg Oral or Feeding Tube BID     QUEtiapine  25 mg Oral or Feeding Tube BID     QUEtiapine  50 mg Oral At Bedtime     rosuvastatin  10 mg Oral or Feeding Tube Daily     sodium chloride (PF)  10-40 mL Intracatheter Q8H     sodium chloride (PF)  3 mL Intracatheter Q8H     sulfamethoxazole-trimethoprim  1 tablet Oral or Feeding Tube Daily     tacrolimus  2 mg Oral QPM     tacrolimus  2.5 mg Oral QAM     warfarin ANTICOAGULANT  1.5 mg Oral ONCE at 18:00

## 2021-12-13 ENCOUNTER — HOSPITAL ENCOUNTER (INPATIENT)
Facility: CLINIC | Age: 56
LOS: 17 days | Discharge: HOME HEALTH | End: 2021-12-30
Attending: PHYSICAL MEDICINE & REHABILITATION | Admitting: PHYSICAL MEDICINE & REHABILITATION
Payer: COMMERCIAL

## 2021-12-13 VITALS
BODY MASS INDEX: 25.32 KG/M2 | DIASTOLIC BLOOD PRESSURE: 84 MMHG | HEART RATE: 90 BPM | WEIGHT: 148.3 LBS | TEMPERATURE: 98.1 F | HEIGHT: 64 IN | SYSTOLIC BLOOD PRESSURE: 137 MMHG | OXYGEN SATURATION: 98 % | RESPIRATION RATE: 16 BRPM

## 2021-12-13 DIAGNOSIS — J85.1 ABSCESS OF LOWER LOBE OF LEFT LUNG WITH PNEUMONIA (H): ICD-10-CM

## 2021-12-13 DIAGNOSIS — G47.01 INSOMNIA DUE TO MEDICAL CONDITION: ICD-10-CM

## 2021-12-13 DIAGNOSIS — N17.9 AKI (ACUTE KIDNEY INJURY) (H): ICD-10-CM

## 2021-12-13 DIAGNOSIS — F43.22 ADJUSTMENT DISORDER WITH ANXIOUS MOOD: ICD-10-CM

## 2021-12-13 DIAGNOSIS — Z94.2 S/P LUNG TRANSPLANT (H): Chronic | ICD-10-CM

## 2021-12-13 DIAGNOSIS — K72.00 SHOCK LIVER: ICD-10-CM

## 2021-12-13 DIAGNOSIS — E11.65 TYPE 2 DIABETES MELLITUS WITH HYPERGLYCEMIA, WITH LONG-TERM CURRENT USE OF INSULIN (H): ICD-10-CM

## 2021-12-13 DIAGNOSIS — I48.91 ATRIAL FIBRILLATION, UNSPECIFIED TYPE (H): ICD-10-CM

## 2021-12-13 DIAGNOSIS — I63.9 ISCHEMIC STROKE (H): Primary | ICD-10-CM

## 2021-12-13 DIAGNOSIS — Z79.4 TYPE 2 DIABETES MELLITUS WITH HYPERGLYCEMIA, WITH LONG-TERM CURRENT USE OF INSULIN (H): ICD-10-CM

## 2021-12-13 DIAGNOSIS — I10 BENIGN ESSENTIAL HYPERTENSION: ICD-10-CM

## 2021-12-13 LAB
ACID FAST STAIN (ARUP): NORMAL
ACID FAST STAIN (ARUP): NORMAL
DONOR IDENTIFICATION: NORMAL
DQB5: 1703
DSA COMMENTS: NORMAL
DSA PRESENT: YES
DSA TEST METHOD: NORMAL
GLUCOSE BLDC GLUCOMTR-MCNC: 122 MG/DL (ref 70–99)
GLUCOSE BLDC GLUCOMTR-MCNC: 124 MG/DL (ref 70–99)
GLUCOSE BLDC GLUCOMTR-MCNC: 143 MG/DL (ref 70–99)
GLUCOSE BLDC GLUCOMTR-MCNC: 188 MG/DL (ref 70–99)
GLUCOSE BLDC GLUCOMTR-MCNC: 268 MG/DL (ref 70–99)
GLUCOSE BLDC GLUCOMTR-MCNC: 89 MG/DL (ref 70–99)
HOLD SPECIMEN: NORMAL
HOLD SPECIMEN: NORMAL
INR PPP: 2.57 (ref 0.85–1.15)
ORGAN: NORMAL
SA 1 CELL: NORMAL
SA 1 TEST METHOD: NORMAL
SA 2 CELL: NORMAL
SA 2 TEST METHOD: NORMAL
SA1 HI RISK ABY: NORMAL
SA1 MOD RISK ABY: NORMAL
SA2 HI RISK ABY: NORMAL
SA2 MOD RISK ABY: NORMAL
UNACCEPTABLE ANTIGENS: NORMAL
UNOS CPRA: 0
ZZZSA 1  COMMENTS: NORMAL
ZZZSA 2 COMMENTS: NORMAL

## 2021-12-13 PROCEDURE — 250N000012 HC RX MED GY IP 250 OP 636 PS 637: Performed by: PHYSICIAN ASSISTANT

## 2021-12-13 PROCEDURE — 99207 PR CONSULT E&M CHANGED TO INITIAL LEVEL: CPT | Performed by: INTERNAL MEDICINE

## 2021-12-13 PROCEDURE — 94640 AIRWAY INHALATION TREATMENT: CPT

## 2021-12-13 PROCEDURE — 94668 MNPJ CHEST WALL SBSQ: CPT

## 2021-12-13 PROCEDURE — 250N000012 HC RX MED GY IP 250 OP 636 PS 637: Performed by: INTERNAL MEDICINE

## 2021-12-13 PROCEDURE — 250N000013 HC RX MED GY IP 250 OP 250 PS 637: Performed by: STUDENT IN AN ORGANIZED HEALTH CARE EDUCATION/TRAINING PROGRAM

## 2021-12-13 PROCEDURE — 250N000012 HC RX MED GY IP 250 OP 636 PS 637: Performed by: NURSE PRACTITIONER

## 2021-12-13 PROCEDURE — 272N000083 HC NUTRITION PRODUCT SEMIELEM INTERMED LITER

## 2021-12-13 PROCEDURE — 250N000012 HC RX MED GY IP 250 OP 636 PS 637: Performed by: STUDENT IN AN ORGANIZED HEALTH CARE EDUCATION/TRAINING PROGRAM

## 2021-12-13 PROCEDURE — 250N000009 HC RX 250: Performed by: INTERNAL MEDICINE

## 2021-12-13 PROCEDURE — 999N000157 HC STATISTIC RCP TIME EA 10 MIN

## 2021-12-13 PROCEDURE — 99223 1ST HOSP IP/OBS HIGH 75: CPT | Mod: 24 | Performed by: PHYSICAL MEDICINE & REHABILITATION

## 2021-12-13 PROCEDURE — 250N000011 HC RX IP 250 OP 636: Performed by: STUDENT IN AN ORGANIZED HEALTH CARE EDUCATION/TRAINING PROGRAM

## 2021-12-13 PROCEDURE — 250N000009 HC RX 250: Performed by: NURSE PRACTITIONER

## 2021-12-13 PROCEDURE — 85610 PROTHROMBIN TIME: CPT | Performed by: STUDENT IN AN ORGANIZED HEALTH CARE EDUCATION/TRAINING PROGRAM

## 2021-12-13 PROCEDURE — 250N000011 HC RX IP 250 OP 636: Performed by: INTERNAL MEDICINE

## 2021-12-13 PROCEDURE — 250N000013 HC RX MED GY IP 250 OP 250 PS 637: Performed by: NURSE PRACTITIONER

## 2021-12-13 PROCEDURE — 36592 COLLECT BLOOD FROM PICC: CPT | Performed by: STUDENT IN AN ORGANIZED HEALTH CARE EDUCATION/TRAINING PROGRAM

## 2021-12-13 PROCEDURE — 250N000009 HC RX 250: Performed by: PHYSICAL MEDICINE & REHABILITATION

## 2021-12-13 PROCEDURE — 99233 SBSQ HOSP IP/OBS HIGH 50: CPT | Mod: 24 | Performed by: PHYSICIAN ASSISTANT

## 2021-12-13 PROCEDURE — 99239 HOSP IP/OBS DSCHRG MGMT >30: CPT | Performed by: STUDENT IN AN ORGANIZED HEALTH CARE EDUCATION/TRAINING PROGRAM

## 2021-12-13 PROCEDURE — 99222 1ST HOSP IP/OBS MODERATE 55: CPT | Performed by: INTERNAL MEDICINE

## 2021-12-13 PROCEDURE — 250N000013 HC RX MED GY IP 250 OP 250 PS 637: Performed by: INTERNAL MEDICINE

## 2021-12-13 PROCEDURE — 128N000003 HC R&B REHAB

## 2021-12-13 PROCEDURE — 94640 AIRWAY INHALATION TREATMENT: CPT | Mod: 76

## 2021-12-13 PROCEDURE — 250N000013 HC RX MED GY IP 250 OP 250 PS 637: Performed by: PHYSICAL MEDICINE & REHABILITATION

## 2021-12-13 PROCEDURE — 250N000013 HC RX MED GY IP 250 OP 250 PS 637: Performed by: ANESTHESIOLOGY

## 2021-12-13 RX ORDER — LEVALBUTEROL INHALATION SOLUTION 1.25 MG/3ML
1.25 SOLUTION RESPIRATORY (INHALATION) 3 TIMES DAILY
Status: DISCONTINUED | OUTPATIENT
Start: 2021-12-13 | End: 2021-12-29

## 2021-12-13 RX ORDER — TACROLIMUS 1 MG/1
2 CAPSULE ORAL EVERY EVENING
Status: DISCONTINUED | OUTPATIENT
Start: 2021-12-13 | End: 2021-12-23

## 2021-12-13 RX ORDER — ACETYLCYSTEINE 200 MG/ML
2 SOLUTION ORAL; RESPIRATORY (INHALATION) 3 TIMES DAILY
Status: DISCONTINUED | OUTPATIENT
Start: 2021-12-13 | End: 2021-12-13

## 2021-12-13 RX ORDER — LIDOCAINE 4 G/G
2 PATCH TOPICAL EVERY 24 HOURS
Status: DISCONTINUED | OUTPATIENT
Start: 2021-12-13 | End: 2021-12-19

## 2021-12-13 RX ORDER — PROCHLORPERAZINE MALEATE 10 MG
10 TABLET ORAL EVERY 6 HOURS PRN
Status: DISCONTINUED | OUTPATIENT
Start: 2021-12-13 | End: 2021-12-28

## 2021-12-13 RX ORDER — PANTOPRAZOLE SODIUM 40 MG/1
40 TABLET, DELAYED RELEASE ORAL
Status: DISCONTINUED | OUTPATIENT
Start: 2021-12-13 | End: 2021-12-30 | Stop reason: HOSPADM

## 2021-12-13 RX ORDER — HYDROXYZINE HYDROCHLORIDE 25 MG/1
25 TABLET, FILM COATED ORAL EVERY 6 HOURS PRN
Status: DISCONTINUED | OUTPATIENT
Start: 2021-12-13 | End: 2021-12-28

## 2021-12-13 RX ORDER — ACETYLCYSTEINE 200 MG/ML
2 SOLUTION ORAL; RESPIRATORY (INHALATION) 3 TIMES DAILY
Status: DISCONTINUED | OUTPATIENT
Start: 2021-12-13 | End: 2021-12-29

## 2021-12-13 RX ORDER — ROSUVASTATIN CALCIUM 10 MG/1
10 TABLET, COATED ORAL DAILY
Status: DISCONTINUED | OUTPATIENT
Start: 2021-12-14 | End: 2021-12-30 | Stop reason: HOSPADM

## 2021-12-13 RX ORDER — MAGNESIUM OXIDE 400 MG/1
800 TABLET ORAL EVERY MORNING
Status: DISCONTINUED | OUTPATIENT
Start: 2021-12-14 | End: 2021-12-20

## 2021-12-13 RX ORDER — ACETAMINOPHEN 325 MG/1
975 TABLET ORAL EVERY 8 HOURS
Status: DISCONTINUED | OUTPATIENT
Start: 2021-12-13 | End: 2021-12-19

## 2021-12-13 RX ORDER — HEPARIN SODIUM,PORCINE 10 UNIT/ML
5-20 VIAL (ML) INTRAVENOUS
Status: DISCONTINUED | OUTPATIENT
Start: 2021-12-13 | End: 2021-12-13 | Stop reason: HOSPADM

## 2021-12-13 RX ORDER — NALOXONE HYDROCHLORIDE 0.4 MG/ML
0.2 INJECTION, SOLUTION INTRAMUSCULAR; INTRAVENOUS; SUBCUTANEOUS
Status: DISCONTINUED | OUTPATIENT
Start: 2021-12-13 | End: 2021-12-30 | Stop reason: HOSPADM

## 2021-12-13 RX ORDER — NICOTINE POLACRILEX 4 MG
15-30 LOZENGE BUCCAL
Status: DISCONTINUED | OUTPATIENT
Start: 2021-12-13 | End: 2021-12-30 | Stop reason: HOSPADM

## 2021-12-13 RX ORDER — SULFAMETHOXAZOLE AND TRIMETHOPRIM 400; 80 MG/1; MG/1
1 TABLET ORAL DAILY
Status: DISCONTINUED | OUTPATIENT
Start: 2021-12-14 | End: 2021-12-30 | Stop reason: HOSPADM

## 2021-12-13 RX ORDER — ACETAMINOPHEN 325 MG/1
975 TABLET ORAL EVERY 8 HOURS
Status: DISCONTINUED | OUTPATIENT
Start: 2021-12-13 | End: 2021-12-13

## 2021-12-13 RX ORDER — HEPARIN SODIUM,PORCINE 10 UNIT/ML
5-20 VIAL (ML) INTRAVENOUS EVERY 24 HOURS
Status: DISCONTINUED | OUTPATIENT
Start: 2021-12-13 | End: 2021-12-13 | Stop reason: HOSPADM

## 2021-12-13 RX ORDER — QUETIAPINE FUMARATE 50 MG/1
50 TABLET, FILM COATED ORAL AT BEDTIME
Status: DISCONTINUED | OUTPATIENT
Start: 2021-12-13 | End: 2021-12-20

## 2021-12-13 RX ORDER — BISACODYL 10 MG
10 SUPPOSITORY, RECTAL RECTAL DAILY PRN
Status: DISCONTINUED | OUTPATIENT
Start: 2021-12-13 | End: 2021-12-29

## 2021-12-13 RX ORDER — ESCITALOPRAM OXALATE 10 MG/1
10 TABLET ORAL DAILY
Status: DISCONTINUED | OUTPATIENT
Start: 2021-12-14 | End: 2021-12-14

## 2021-12-13 RX ORDER — CEFTAZIDIME 2 G/1
2 INJECTION, POWDER, FOR SOLUTION INTRAVENOUS EVERY 8 HOURS
Status: COMPLETED | OUTPATIENT
Start: 2021-12-13 | End: 2021-12-21

## 2021-12-13 RX ORDER — QUETIAPINE FUMARATE 25 MG/1
25 TABLET, FILM COATED ORAL 3 TIMES DAILY PRN
Status: DISCONTINUED | OUTPATIENT
Start: 2021-12-13 | End: 2021-12-28

## 2021-12-13 RX ORDER — MAGNESIUM OXIDE 400 MG/1
400 TABLET ORAL EVERY EVENING
Status: DISCONTINUED | OUTPATIENT
Start: 2021-12-13 | End: 2021-12-20

## 2021-12-13 RX ORDER — LIDOCAINE HYDROCHLORIDE 20 MG/ML
5 SOLUTION OROPHARYNGEAL
Status: DISCONTINUED | OUTPATIENT
Start: 2021-12-13 | End: 2021-12-28

## 2021-12-13 RX ORDER — AMLODIPINE BESYLATE 5 MG/1
5 TABLET ORAL DAILY
Status: DISCONTINUED | OUTPATIENT
Start: 2021-12-14 | End: 2021-12-30 | Stop reason: HOSPADM

## 2021-12-13 RX ORDER — GUAR GUM
1 PACKET (EA) ORAL 2 TIMES DAILY
Status: DISCONTINUED | OUTPATIENT
Start: 2021-12-13 | End: 2021-12-20 | Stop reason: CLARIF

## 2021-12-13 RX ORDER — QUETIAPINE FUMARATE 25 MG/1
25 TABLET, FILM COATED ORAL 2 TIMES DAILY
Status: DISCONTINUED | OUTPATIENT
Start: 2021-12-14 | End: 2021-12-13

## 2021-12-13 RX ORDER — MYCOPHENOLATE MOFETIL 250 MG/1
1000 CAPSULE ORAL 2 TIMES DAILY
Status: DISCONTINUED | OUTPATIENT
Start: 2021-12-13 | End: 2021-12-30 | Stop reason: HOSPADM

## 2021-12-13 RX ORDER — PREDNISONE 10 MG/1
10 TABLET ORAL 2 TIMES DAILY
Status: DISCONTINUED | OUTPATIENT
Start: 2021-12-13 | End: 2021-12-30 | Stop reason: HOSPADM

## 2021-12-13 RX ORDER — FLUCONAZOLE 2 MG/ML
400 INJECTION, SOLUTION INTRAVENOUS EVERY 24 HOURS
Status: COMPLETED | OUTPATIENT
Start: 2021-12-14 | End: 2021-12-21

## 2021-12-13 RX ORDER — ZINC/PETROLATUM,WHITE
1 PASTE (GRAM) TOPICAL
Status: DISCONTINUED | OUTPATIENT
Start: 2021-12-13 | End: 2021-12-30 | Stop reason: HOSPADM

## 2021-12-13 RX ORDER — FLUTICASONE PROPIONATE 50 MCG
1 SPRAY, SUSPENSION (ML) NASAL DAILY
Status: DISCONTINUED | OUTPATIENT
Start: 2021-12-14 | End: 2021-12-30 | Stop reason: HOSPADM

## 2021-12-13 RX ORDER — NYSTATIN 100000/ML
1000000 SUSPENSION, ORAL (FINAL DOSE FORM) ORAL 4 TIMES DAILY
Status: DISCONTINUED | OUTPATIENT
Start: 2021-12-13 | End: 2021-12-30 | Stop reason: HOSPADM

## 2021-12-13 RX ORDER — FUROSEMIDE 40 MG
40 TABLET ORAL DAILY
Status: DISCONTINUED | OUTPATIENT
Start: 2021-12-14 | End: 2021-12-30 | Stop reason: HOSPADM

## 2021-12-13 RX ORDER — CYCLOBENZAPRINE HCL 5 MG
10 TABLET ORAL AT BEDTIME
Status: DISCONTINUED | OUTPATIENT
Start: 2021-12-13 | End: 2021-12-13

## 2021-12-13 RX ORDER — NALOXONE HYDROCHLORIDE 0.4 MG/ML
0.4 INJECTION, SOLUTION INTRAMUSCULAR; INTRAVENOUS; SUBCUTANEOUS
Status: DISCONTINUED | OUTPATIENT
Start: 2021-12-13 | End: 2021-12-30 | Stop reason: HOSPADM

## 2021-12-13 RX ORDER — DEXTROSE MONOHYDRATE 25 G/50ML
25-50 INJECTION, SOLUTION INTRAVENOUS
Status: DISCONTINUED | OUTPATIENT
Start: 2021-12-13 | End: 2021-12-30 | Stop reason: HOSPADM

## 2021-12-13 RX ORDER — LEVOTHYROXINE SODIUM 25 UG/1
25 TABLET ORAL DAILY
Status: DISCONTINUED | OUTPATIENT
Start: 2021-12-14 | End: 2021-12-30 | Stop reason: HOSPADM

## 2021-12-13 RX ORDER — ONDANSETRON 4 MG/1
4 TABLET, ORALLY DISINTEGRATING ORAL EVERY 6 HOURS PRN
Status: DISCONTINUED | OUTPATIENT
Start: 2021-12-13 | End: 2021-12-30 | Stop reason: HOSPADM

## 2021-12-13 RX ORDER — AMOXICILLIN 250 MG
1 CAPSULE ORAL
Status: DISCONTINUED | OUTPATIENT
Start: 2021-12-13 | End: 2021-12-30 | Stop reason: HOSPADM

## 2021-12-13 RX ORDER — DEXTROSE MONOHYDRATE 100 MG/ML
INJECTION, SOLUTION INTRAVENOUS CONTINUOUS PRN
Status: DISCONTINUED | OUTPATIENT
Start: 2021-12-13 | End: 2021-12-30 | Stop reason: HOSPADM

## 2021-12-13 RX ORDER — MULTIPLE VITAMINS W/ MINERALS TAB 9MG-400MCG
1 TAB ORAL DAILY
Status: DISCONTINUED | OUTPATIENT
Start: 2021-12-14 | End: 2021-12-30 | Stop reason: HOSPADM

## 2021-12-13 RX ORDER — OXYCODONE HYDROCHLORIDE 5 MG/1
5-10 TABLET ORAL EVERY 4 HOURS PRN
Status: DISCONTINUED | OUTPATIENT
Start: 2021-12-13 | End: 2021-12-16

## 2021-12-13 RX ORDER — ALBUTEROL SULFATE 0.83 MG/ML
2.5 SOLUTION RESPIRATORY (INHALATION)
Status: DISCONTINUED | OUTPATIENT
Start: 2021-12-13 | End: 2021-12-29

## 2021-12-13 RX ADMIN — Medication 1.5 MG: at 17:25

## 2021-12-13 RX ADMIN — MYCOPHENOLATE MOFETIL 1000 MG: 250 CAPSULE ORAL at 21:44

## 2021-12-13 RX ADMIN — Medication 1 TABLET: at 08:38

## 2021-12-13 RX ADMIN — QUETIAPINE FUMARATE 25 MG: 25 TABLET ORAL at 13:11

## 2021-12-13 RX ADMIN — Medication 400 MG: at 17:27

## 2021-12-13 RX ADMIN — FLUCONAZOLE IN SODIUM CHLORIDE 400 MG: 2 INJECTION, SOLUTION INTRAVENOUS at 10:16

## 2021-12-13 RX ADMIN — CEFTAZIDIME 2 G: 2 INJECTION, POWDER, FOR SOLUTION INTRAVENOUS at 12:24

## 2021-12-13 RX ADMIN — INSULIN GLARGINE 30 UNITS: 100 INJECTION, SOLUTION SUBCUTANEOUS at 21:45

## 2021-12-13 RX ADMIN — FLUTICASONE PROPIONATE 1 SPRAY: 50 SPRAY, METERED NASAL at 08:44

## 2021-12-13 RX ADMIN — ROSUVASTATIN CALCIUM 10 MG: 10 TABLET, FILM COATED ORAL at 08:39

## 2021-12-13 RX ADMIN — LEVALBUTEROL HYDROCHLORIDE 1.25 MG: 1.25 SOLUTION RESPIRATORY (INHALATION) at 21:30

## 2021-12-13 RX ADMIN — ACETYLCYSTEINE 2 ML: 200 SOLUTION ORAL; RESPIRATORY (INHALATION) at 08:06

## 2021-12-13 RX ADMIN — PANTOPRAZOLE SODIUM 40 MG: 40 TABLET, DELAYED RELEASE ORAL at 17:27

## 2021-12-13 RX ADMIN — CEFTAZIDIME 2 G: 2 INJECTION, POWDER, FOR SOLUTION INTRAVENOUS at 20:16

## 2021-12-13 RX ADMIN — ACETAMINOPHEN 975 MG: 325 TABLET, FILM COATED ORAL at 20:21

## 2021-12-13 RX ADMIN — NYSTATIN 1000000 UNITS: 100000 SUSPENSION ORAL at 17:29

## 2021-12-13 RX ADMIN — INSULIN ASPART 2 UNITS: 100 INJECTION, SOLUTION INTRAVENOUS; SUBCUTANEOUS at 20:21

## 2021-12-13 RX ADMIN — NYSTATIN 1000000 UNITS: 500000 SUSPENSION ORAL at 08:38

## 2021-12-13 RX ADMIN — PANTOPRAZOLE SODIUM 40 MG: 40 TABLET, DELAYED RELEASE ORAL at 08:39

## 2021-12-13 RX ADMIN — Medication 800 MG: at 08:39

## 2021-12-13 RX ADMIN — INSULIN ASPART 6 UNITS: 100 INJECTION, SOLUTION INTRAVENOUS; SUBCUTANEOUS at 04:09

## 2021-12-13 RX ADMIN — ESCITALOPRAM OXALATE 10 MG: 10 TABLET ORAL at 08:39

## 2021-12-13 RX ADMIN — PREDNISONE 10 MG: 10 TABLET ORAL at 08:39

## 2021-12-13 RX ADMIN — LEVOTHYROXINE SODIUM 25 MCG: 0.03 TABLET ORAL at 08:39

## 2021-12-13 RX ADMIN — INSULIN ASPART 1 UNITS: 100 INJECTION, SOLUTION INTRAVENOUS; SUBCUTANEOUS at 17:22

## 2021-12-13 RX ADMIN — SULFAMETHOXAZOLE AND TRIMETHOPRIM 1 TABLET: 400; 80 TABLET ORAL at 08:39

## 2021-12-13 RX ADMIN — PREDNISONE 10 MG: 10 TABLET ORAL at 21:44

## 2021-12-13 RX ADMIN — CEFTAZIDIME 2 G: 2 INJECTION, POWDER, FOR SOLUTION INTRAVENOUS at 04:08

## 2021-12-13 RX ADMIN — ACETYLCYSTEINE 2 ML: 200 SOLUTION ORAL; RESPIRATORY (INHALATION) at 21:30

## 2021-12-13 RX ADMIN — TACROLIMUS 2.5 MG: 1 CAPSULE ORAL at 08:39

## 2021-12-13 RX ADMIN — QUETIAPINE FUMARATE 50 MG: 50 TABLET ORAL at 21:44

## 2021-12-13 RX ADMIN — NYSTATIN 1000000 UNITS: 500000 SUSPENSION ORAL at 12:03

## 2021-12-13 RX ADMIN — ACETYLCYSTEINE 2 ML: 200 SOLUTION ORAL; RESPIRATORY (INHALATION) at 14:01

## 2021-12-13 RX ADMIN — INSULIN GLARGINE 30 UNITS: 100 INJECTION, SOLUTION SUBCUTANEOUS at 08:42

## 2021-12-13 RX ADMIN — MYCOPHENOLATE MOFETIL 1000 MG: 250 CAPSULE ORAL at 08:38

## 2021-12-13 RX ADMIN — FUROSEMIDE 40 MG: 40 TABLET ORAL at 08:38

## 2021-12-13 RX ADMIN — CALCIUM CARBONATE 600 MG (1,500 MG)-VITAMIN D3 400 UNIT TABLET 1 TABLET: at 08:38

## 2021-12-13 RX ADMIN — AMLODIPINE BESYLATE 5 MG: 2.5 TABLET ORAL at 08:38

## 2021-12-13 RX ADMIN — NYSTATIN 1000000 UNITS: 100000 SUSPENSION ORAL at 20:21

## 2021-12-13 RX ADMIN — TACROLIMUS 2 MG: 1 CAPSULE ORAL at 17:31

## 2021-12-13 RX ADMIN — CALCIUM CARBONATE 600 MG (1,500 MG)-VITAMIN D3 400 UNIT TABLET 1 TABLET: at 17:27

## 2021-12-13 RX ADMIN — Medication 1 PACKET: at 08:48

## 2021-12-13 RX ADMIN — ACETAMINOPHEN 975 MG: 325 TABLET, FILM COATED ORAL at 13:11

## 2021-12-13 RX ADMIN — LEVALBUTEROL HYDROCHLORIDE 1.25 MG: 1.25 SOLUTION RESPIRATORY (INHALATION) at 14:01

## 2021-12-13 RX ADMIN — HYDROXYZINE HYDROCHLORIDE 50 MG: 50 TABLET, FILM COATED ORAL at 06:30

## 2021-12-13 RX ADMIN — Medication 5 ML: at 05:35

## 2021-12-13 RX ADMIN — LEVALBUTEROL HYDROCHLORIDE 1.25 MG: 1.25 SOLUTION RESPIRATORY (INHALATION) at 08:06

## 2021-12-13 RX ADMIN — QUETIAPINE FUMARATE 25 MG: 25 TABLET ORAL at 08:39

## 2021-12-13 RX ADMIN — Medication 1 PACKET: at 21:43

## 2021-12-13 ASSESSMENT — ACTIVITIES OF DAILY LIVING (ADL)
ADLS_ACUITY_SCORE: 27
ADLS_ACUITY_SCORE: 14
ADLS_ACUITY_SCORE: 17
ADLS_ACUITY_SCORE: 17
ADLS_ACUITY_SCORE: 14
ADLS_ACUITY_SCORE: 18
ADLS_ACUITY_SCORE: 17
ADLS_ACUITY_SCORE: 18
ADLS_ACUITY_SCORE: 17
ADLS_ACUITY_SCORE: 14
ADLS_ACUITY_SCORE: 14
ADLS_ACUITY_SCORE: 12
ADLS_ACUITY_SCORE: 17
ADLS_ACUITY_SCORE: 18
ADLS_ACUITY_SCORE: 14
ADLS_ACUITY_SCORE: 14
ADLS_ACUITY_SCORE: 18
ADLS_ACUITY_SCORE: 17

## 2021-12-13 ASSESSMENT — MIFFLIN-ST. JEOR
SCORE: 1413.93
SCORE: 1433.19

## 2021-12-13 NOTE — DISCHARGE SUMMARY
[facility]  Hospitalist Discharge Summary      Date of Admission:  9/5/2021  Date of Discharge:  12/13/2021  Discharging Provider: Gume Baldwin MD  Discharge Team: Hospitalist Service, Gold 11    Discharge Diagnoses   Acute on chronic hypoxemic respiratory failure  ILD and NSIP  Bilateral sequential lung transplant  Bilateral pleural effusions  Cavitary lesion in the left lung  Klebsiella pneumoniae and Pseudomonas pneumonia  Invasive candidiasis  Hypogammaglobulinemia  Encephalopathy, multifactorial, resolved  Subacute CVA  Atrial fibrillation  Right upper extremity subclavian DVT  Type 2 diabetes mellitus on insulin  Severe malnutrition in the context of acute on chronic illness  Anxiety  Depression  Rheumatoid arthritis  SALTY  Hypertension  Hypothyroidism  HSV  Hypernatremia  Gastrointestinal blood loss      Follow-ups Needed After Discharge   Follow-up Appointments     Follow Up and recommended labs and tests      1. Follow up with pulmonary medicine as scheduled.             Discharge Disposition   Discharged to rehabilitation facility  Condition at discharge: Stable  Patient ready to discharge to a skilled nursing facility as soon as possible in order to create capacity for patients related to the COVID-19 pandemic.    Hospital Course   Edson Thornton is a 57 yo M with PMHx of NSIP/ILD 2/2 Rheumatoid lung disease, rheumatoid arthritis, bronchiectasis, pulmonary HTN, SALTY, essential HTN, HLD, PLD, hypogammaglobinemia, duodenal anomaly, anxiety, and depression.  Patient was initially admitted to the hospital on 9/5/2021 with acute on chronic hypoxemic respiratory failure for transplant work-up.  He underwent bilateral lung transplant on 10/16/2021, with post operative course complicated by prolonged ventilator wean s/p trach and PEG/J tube placement with thoracic surgery on 10/29. Post-operative course complicated encephalopathy, subacute CVA, afib with RVR, BRENNAN,  candidemia/candida empyema, and brief bradycardic arrest and subsequent hypotension in the setting of gastrointestinal blood loss.  Patient was transferred from [facility] to [facility] on 11/23/2021.    His tracheostomy was decannulated on 12/8/2021.  He currently is stable off of oxygen.      ------------------------------  ILD and NSIP s/p BSLT on 10/16/2021  Acute on chronic hypoxic respiratory failure s/p tracheostomy on 10/29/21 and decannulation on 12/8/2021  Bilateral pleural effusions, improved  - Transplant pulmonology consultation appreciated  -Immunosuppression: s/p basiliximab on 10/16 and 10/20. Continue tacrolimus, MMF, prednisone per transplant pulmonary medicine recommendations  -Prophylaxis: Continue bactrim 400-80 mg daily, nystatin QID x6 months  - DSA every 2 weeks   - Levalbuterol and mucomyst QID and pulm toilet and chest PT QID  - PT/OT/SLP   -Continue 40 mg of Lasix daily, this has been a good maintenance dose for the patient  - Oxycodone 5-10 mg Q4H prn, would aggressively taper this over the course of the next week to 2 weeks  - Schedule tylenol     Left lung cavitary lesion   Suspect aspiration vs pseudomonal vs Klebsiella pneumonia related abscess. CT on 10/25 with LLL nodular opacity. Repeat CT chest on 11/22 with new cavitary lesion in the left lung base, and with multifocal bilateral upper lobe GGO (some of which are new), new 1.8 cm fluid collection with surrounding fat stranding in the left axilla, decreased bilateral loculated pleural effusions. Indeterminate Quant Gold, but negative tuberculin skin tests on mulitple occurences. S/p BAL on 11/22. ID feels less likely fungal. BD glucan positive but has known candidemia. Cocci antigen in urine negative, serum histoplasmosis negative, CRAG negative.  -Appreciate transplant infectious disease consultation  - ID Ceftazdime 2 grams Q8H (until 12/21)  - Follow up CT chest in 4 weeks on 12/21  - Please include in the patient's discharge  instructions, follow up with Dr. Abebe on 12/21/2021 @6:30 PM in a virtual visit.      Klebsiella pneumoniae and Pseudomonas fluorescens/putida HAP  Klebsiella initially noted trach sputum culture 11/10. Bronch culture 11/12 with Klebsiella and Pseudomonas fluorescens/putida (R-meropenem).     - Abx as above      Invasive candidiasis with Candida albicans  Positive candida BCx 10/20 and 10/22.  BDG fungitell positive (399) on 10/20. Sputum Cx + Candida albicans persistently.  TC 10/23 without evidence of endocarditis. Ophthalmology consult 10/24 with benign dilated fundoscopic exam.  Candida empyema also noted 10/25, chest tubes inadvertently removed by CVTS 10/28, left chest tube replaced by IR 11/3 as above with ongoing Candida on cultures. Repeat pleural fungal cultures and fungal/bacterial blood cultures on 11/18 NGTD.   -No longer has any chest tubes in place  -Initially on Micafungin (10/22-10/27) transition to fluconazole 400 mg IV daily (start date 10/26 end date 12/21)     Hypogammaglobinemia  Received IVIG with plan to repeat IgG on 12/15.    Encephalopathy, resolved  Difficulty sleeping  Likely multi-factorial in the setting of stroke, prolonged critical illness.  - Seroquel 25 mg BID and 50 mg at bedtime, and seroquel 25 mg TID PRN; could start to wean these medications over the next couple of weeks.  - Melatonin 10 mg at bedtime     Acute to subacute embolic CVA   CODE STROKE on 10/22, due to limited movement of bilateral lower extremities. MRI brain on 10/23 with multifocal subacute infarct within both cerebral hemispheres and left cerebellum. Presumed embolic with afib hx. He is currently able to ambulate with relative ease.  - Anticoagulation as noted below.     Atrial fibrillation  [id number] for HTN, CVA, and DM2. First noted on 10/18, started on amiodarone drip, and converted to NSR. Metoprolol and amidoarone discontinued on 11/20 due to intermittent bradycardia.   - Anticoagulation with  warfarin as noted below.  - Continue Rosuvastatin 10 mg daily.     Right subclavian DVT   Diagnosed on 11/4 on ultrasound.  - Continue warfarin per pharmacy protocol     DM2 with steroid induced hyperglycemia  Hemoglobin A1c 6.6 pre-operatively. Endocrine recently consulted for steroids induced hyperglycemia.   - Continue Lantus 30 mg BID  - Novolog High Sliding scale insulin Q4H; once patient is off tube feeds could transition to 4 times daily with meals.  -Blood glucose Q4H; once patient is off tube feeds could transition to 4 times daily with meals.     Severe malnutrition in the context of acute on chronic illness  Patient currently has a PEG J-tube and is getting the Jorde of nutrition through cycle tube feeds.  -Multivitamin, folic acid, B6, B12 supplements   - TF per nutrition; continue oral diet as well  -Hopefully over the next couple of weeks we can decrease tube feeds and allow the patient to eat more food by mouth.  Currently on cycle tube feeds.     Anxiety and depression   Psychiatry reconsulted and after discussion suggest increasing lexapro. Will discuss with patient regarding starting ritalin in the AM for motivation.  - Lexapro increased to 10 mg daily could increase to 20 in a week ~12/12.  -As needed hydroxyzine.     Concern for chipped tooth  Poor dental hygiene  Patient noted he feels like he may have chipped his tooth or has a loose tooth after eating guevara. It is not painful.   - Dental hygiene consult placed and chipped tooth stable rec oral hygiene cares.     ------ Chronic stable medical problems ------     Rheumatoid arthritis- Dx 5/2021 with + CCP antibody and RF. Previously treated with rituximab. Rheum consulted early in admission. On steroids as noted above.   SALTY - Uses CPAP at bedtime prior to admission.  HTN- Continue PTA amlodipine.   Hypothyroidism - TSH 3.17 on 11/19/21. Continue PTA levothyroxine 25 mcg daily.     ------ Resolved Hospital problems -------     Recurrent GIB -  hgb drop on 10/22 s/p 2 unit pRBC transfusion with EGD on 10/23 with NJ/OG tube trauma with scant oozing. Patient then developed progressive hypotension and ultimately CODE BLUE for GIB in setting of anticoagulation with heparin drip, s/p massive transfusion protocol. EGD on 11/2 with large amount of clotted blood in stomach and area of raised mucosa with small adherent clot near PEG tube site that was clipped, no active bleeding.  Maroon stools 11/3, repeat EGD with extensive old blood in stomach, no active bleeding, small nodular area with prior clips clipped again.  Most recent EGD 11/5 with ulcer noted at PEG tube bumper site, gastritis, and suction marks from G tube. TF noted in G tube drainage bag 11/7, AXR with GJ tube tip projecting over proximal duodenum. GJ tube exchanged 11/9 by GI.  - PO PPI BID      HSV  Chronic intermittent active infection pre-transplant with recent HSV infection: crusted lesions throughout left side of jaw, s/p 10 day treatment course of ACV through 10/9.  HSV PCR blood negative 10/17.  S/p ACV ppx as above (started POD #1 instead of POD #8 given HSV history and location).     Diet:  Tube feeds and regular diet  DVT Prophylaxis: Heparin SQ  Watson Catheter: Not present  Central Lines: None  Code Status: Full Code    Consultations This Hospital Stay   MEDICATION HISTORY IP PHARMACY CONSULT  PULMONARY CF/TRANSPLANT ADULT IP CONSULT  RHEUMATOLOGY IP CONSULT  WOUND OSTOMY CONTINENCE NURSE  IP CONSULT  PHYSICAL THERAPY ADULT IP CONSULT  PULMONARY GENERAL ADULT IP CONSULT  SOCIAL WORK IP CONSULT  CARDIOTHORACIC SURGERY ADULT IP CONSULT  PULMONARY CF/TRANSPLANT ADULT IP CONSULT  PALLIATIVE CARE ADULT IP CONSULT  NUTRITION SERVICES ADULT IP CONSULT  CARDIOLOGY GENERAL ADULT IP CONSULT  PHARMACY IP CONSULT  PHARMACY IP CONSULT  DENTAL HYGIENE IP ADULT CONSULT - UU  WOUND OSTOMY CONTINENCE NURSE  IP CONSULT  INFECTIOUS DISEASE TRANSPLANT SOT ADULT IP CONSULT  DENTISTRY ADULT IP  CONSULT  DERMATOLOGY IP CONSULT  INFECTIOUS DISEASE TRANSPLANT SOT ADULT IP CONSULT  PSYCHOLOGY ADULT IP CONSULT  CARDIAC REHAB IP CONSULT  PULMONARY CF/TRANSPLANT ADULT IP CONSULT  OCCUPATIONAL THERAPY ADULT IP CONSULT  PHYSICAL THERAPY ADULT IP CONSULT  NUTRITION SERVICES ADULT IP CONSULT  SOT MEDICATION HISTORY IP PHARMACY CONSULT  PHARMACY IP CONSULT  PHARMACY IP CONSULT  NUTRITION SERVICES ADULT IP CONSULT  PHARMACY IP CONSULT  GI LUMINAL ADULT IP CONSULT  OPHTHALMOLOGY IP CONSULT  INFECTIOUS DISEASE TRANSPLANT SOT ADULT IP CONSULT  THORACIC SURGERY ADULT IP CONSULT  PHARMACY IP CONSULT  PHARMACY IP CONSULT  VASCULAR ACCESS CARE ADULT IP CONSULT  INTERNAL MEDICINE PROCEDURE TEAM ADULT IP CONSULT E[place] - LUMBAR PUNCTURE  INTERVENTIONAL RADIOLOGY ADULT/PEDS IP CONSULT  ENT IP CONSULT  PHARMACY IP CONSULT  PHARMACY IP CONSULT  NEPHROLOGY ICU IP CONSULT  GI LUMINAL ADULT IP CONSULT  INTERVENTIONAL RADIOLOGY ADULT/PEDS IP CONSULT  PHARMACY IP CONSULT  VASCULAR ACCESS FOR PICC PLACEMENT ADULT IP CONSULT  PHARMACY IP CONSULT  PHARMACY IP CONSULT  PHARMACY IP CONSULT  PHARMACY IP CONSULT  PHARMACY IP CONSULT  PHARMACY IP CONSULT  INTERVENTIONAL RADIOLOGY ADULT/PEDS IP CONSULT  CARE MANAGEMENT / SOCIAL WORK IP CONSULT  GI LUMINAL ADULT IP CONSULT  PHARMACY TO DOSE WARFARIN  ENDOCRINE DIABETES ADULT IP CONSULT  INFECTIOUS DISEASE TRANSPLANT SOT ADULT IP CONSULT  INFECTIOUS DISEASE TRANSPLANT HSCT/ HEME MALIG ADULT IP CONSULT  SPEECH LANGUAGE PATH ADULT IP CONSULT  SPEAKING VALVE WITH CUFF DEFLATION IP CONSULT  SPEECH LANGUAGE PATH ADULT IP CONSULT  PHARMACY TO DOSE PHYTONADIONE  INTERNAL MEDICINE PROCEDURE TEAM ADULT IP CONSULT E[place] - THORACENTESIS  PSYCHIATRY IP CONSULT  DENTAL HYGIENE IP ADULT CONSULT - UU  PHYSICAL MEDICINE & REHAB ASSESSMENT FOR REHAB PLACEMENT ADULT IP CONSULT  PHYSICAL THERAPY ADULT IP CONSULT  OCCUPATIONAL THERAPY ADULT IP CONSULT  SPEECH LANGUAGE PATH ADULT IP CONSULT    Code Status    Full Code    Time Spent on this Encounter   I, Gume Baldwin MD, personally saw the patient today and spent greater than 30 minutes discharging this patient.       Guem Baldwin MD  [facility] [address] [address]  Phone: [phone]  ______________________________________________________________________    Physical Exam   Vital Signs: Temp: 98.3  F (36.8  C) Temp src: Oral BP: 135/84 Pulse: 103   Resp: 20 SpO2: 96 % O2 Device: None (Room air)    Weight: 147 lbs 11.2 oz         Primary Care Physician   Lia Spicer    Discharge Orders      General info for SNF    Length of Stay Estimate: Short Term Care: Estimated # of Days <30  Condition at Discharge: Improving  Level of care:skilled   Rehabilitation Potential: Good  Admission H&P remains valid and up-to-date: Yes  Recent Chemotherapy: N/A  Use Nursing Home Standing Orders: Yes     Mantoux instructions    Give two-step Mantoux (PPD) Per Facility Policy Yes     Follow Up and recommended labs and tests    1. Follow up with pulmonary medicine as scheduled.     Reason for your hospital stay    You were in the hospital for lung transplant. Your hospital stay was complicated in the post-transplant setting but you are now recovering well and will discharge to a rehab facility for ongoing rehabilitation.     Activity - Up with nursing assistance     Physical Therapy Adult Consult    Evaluate and treat as clinically indicated.    Reason:  post lung transplant     Occupational Therapy Adult Consult    Evaluate and treat as clinically indicated.    Reason:  post lung transplant     Speech Language Path Adult Consult    Evaluate and treat as clinically indicated.    Reason:  post lung transplant     Adult Formula Tube Feeding    Follow this regimen upon discharge: Orders Placed This Encounter      Calorie Counts      Calorie Counts      Calorie Counts      Adult Formula Drip Feeding: Specified Time Vital 1.5; Jejunostomy; Goal  Rate: 80 mL/hr x ~12 hrs (infuse 1 L daily); mL/hr; From: 6:00 PM; 6:00 AM; Medication - Feeding Tube Flush Frequency: See free water flush order. At least 15-30 mL water befo...      Calorie Counts      Snacks/Supplements Adult: Ensure Clear; Betw     Diet    Follow this diet upon discharge: Orders Placed This Encounter      Calorie Counts      Calorie Counts      Calorie Counts      Adult Formula Drip Feeding: Specified Time Vital 1.5; Jejunostomy; Goal Rate: 80 mL/hr x ~12 hrs (infuse 1 L daily); mL/hr; From: 6:00 PM; 6:00 AM; Medication - Feeding Tube Flush Frequency: See free water flush order. At least 15-30 mL water befo...      Calorie Counts      Snacks/Supplements Adult: Ensure Clear; Between       Significant Results and Procedures   Most Recent 3 CBC's:  Recent Labs   Lab Test 12/12/21  0519 12/10/21  0647 12/09/21  0641   WBC 9.7 9.0 9.2   HGB 9.8* 9.3* 9.5*   MCV 93 93 93   * 141* 142*       Discharge Medications   Current Discharge Medication List      START taking these medications    Details   acetylcysteine (MUCOMYST) 20 % neb solution Take 2 mLs by nebulization 3 times daily    Associated Diagnoses: S/P lung transplant (H)      albuterol (PROVENTIL) (2.5 MG/3ML) 0.083% neb solution Take 1 vial (2.5 mg) by nebulization every 2 hours as needed for shortness of breath / dyspnea  Qty: 90 mL    Associated Diagnoses: S/P lung transplant (H)      bisacodyl (DULCOLAX) 10 MG suppository Place 1 suppository (10 mg) rectally daily as needed for constipation (Use if Magnesium hydroxide (MILK of MAGNESIA) not effective after 24 hours. May discontinue if patient having bowel movement.)    Associated Diagnoses: S/P lung transplant (H)      calcium carbonate 600 mg-vitamin D 400 units (CALTRATE) 600-400 MG-UNIT per tablet 1 tablet by Oral or Feeding Tube route 2 times daily (with meals)    Associated Diagnoses: S/P lung transplant (H)      cefTAZidime (FORTAZ) 2 g vial Inject 2 g into the vein every 8  hours    Associated Diagnoses: S/P lung transplant (H)      cyclobenzaprine (FLEXERIL) 10 MG tablet Take 1 tablet (10 mg) by mouth At Bedtime    Associated Diagnoses: S/P lung transplant (H)      fluconazole (DIFLUCAN) 400-0.9 MG/200ML-% infusion Inject 200 mLs (400 mg) into the vein every 24 hours    Associated Diagnoses: S/P lung transplant (H)      fluticasone (FLONASE) 50 MCG/ACT nasal spray Spray 1 spray into both nostrils daily    Associated Diagnoses: S/P lung transplant (H)      folic acid-vit B6-vit B12 (FOLGARD) 0.8-10-0.115 MG TABS per tablet Take 1 tablet by mouth daily    Associated Diagnoses: S/P lung transplant (H)      furosemide (LASIX) 40 MG tablet Take 1 tablet (40 mg) by mouth daily    Associated Diagnoses: S/P lung transplant (H)      Guar Gum (FIBER MODULAR, NUTRISOURCE FIBER,) packet 1 packet by Per Feeding Tube route 2 times daily    Associated Diagnoses: S/P lung transplant (H)      hydrOXYzine (ATARAX) 25 MG tablet Take 1 tablet (25 mg) by mouth every 6 hours as needed for other (adjuvant pain)    Associated Diagnoses: S/P lung transplant (H)      insulin glargine (LANTUS PEN) 100 UNIT/ML pen Inject 30 Units Subcutaneous 2 times daily  Qty: 15 mL    Comments: If Lantus is not covered by insurance, may substitute Basaglar at same dose and frequency.    Associated Diagnoses: S/P lung transplant (H)      levalbuterol (XOPENEX) 1.25 MG/3ML neb solution Take 3 mLs (1.25 mg) by nebulization 3 times daily  Qty: 90 mL    Associated Diagnoses: S/P lung transplant (H)      Lidocaine (LIDOCARE) 4 % Patch Place 2 patches onto the skin every 24 hours To prevent lidocaine toxicity, patient should be patch free for 12 hrs daily.    Associated Diagnoses: S/P lung transplant (H)      lidocaine, viscous, (XYLOCAINE) 2 % solution Take 5 mLs by mouth every 2 hours as needed for moderate pain ; Max 8 doses/24 hour period.    Associated Diagnoses: S/P lung transplant (H)      loperamide (IMODIUM) 2 MG capsule  Take 1 capsule (2 mg) by mouth 4 times daily as needed for diarrhea    Associated Diagnoses: S/P lung transplant (H)      magnesium hydroxide (MILK OF MAGNESIA) 400 MG/5ML suspension Take 30 mLs by mouth daily as needed for constipation (Use if preventive measures (senna-docusate, docusate, and polyethylene glycol) are not effective.)    Associated Diagnoses: S/P lung transplant (H)      !! magnesium oxide (MAG-OX) 400 MG tablet Take 2 tablets (800 mg) by mouth every morning    Associated Diagnoses: S/P lung transplant (H)      !! magnesium oxide (MAG-OX) 400 MG tablet Take 1 tablet (400 mg) by mouth every evening    Associated Diagnoses: S/P lung transplant (H)      menthol (ICY HOT) 5 % PTCH Apply 1 patch topically every 8 hours as needed for muscle soreness    Associated Diagnoses: S/P lung transplant (H)      multivitamin w/minerals (THERA-VIT-M) tablet Take 1 tablet by mouth daily    Associated Diagnoses: S/P lung transplant (H)      mycophenolate (GENERIC EQUIVALENT) 250 MG capsule Take 4 capsules (1,000 mg) by mouth 2 times daily    Associated Diagnoses: S/P lung transplant (H)      nystatin (MYCOSTATIN) 991377 UNIT/ML suspension Swish and swallow 10 mLs (1,000,000 Units) in mouth 4 times daily    Associated Diagnoses: S/P lung transplant (H)      ondansetron (ZOFRAN-ODT) 4 MG ODT tab Take 1 tablet (4 mg) by mouth every 6 hours as needed for nausea or vomiting    Associated Diagnoses: S/P lung transplant (H)      oxyCODONE (ROXICODONE) 5 MG tablet Take 1-2 tablets (5-10 mg) by mouth every 4 hours as needed for moderate to severe pain  Qty: 30 tablet, Refills: 0    Associated Diagnoses: S/P lung transplant (H)      oxymetazoline (AFRIN) 0.05 % nasal spray Apply 2 sprays topically every 4 hours as needed for other (Ear bleeding)    Associated Diagnoses: S/P lung transplant (H)      pantoprazole (PROTONIX) 40 MG EC tablet Take 1 tablet (40 mg) by mouth 2 times daily (before meals)    Associated Diagnoses: S/P  lung transplant (H)      predniSONE (DELTASONE) 10 MG tablet 1 tablet (10 mg) by Oral or Feeding Tube route 2 times daily    Associated Diagnoses: S/P lung transplant (H)      prochlorperazine (COMPAZINE) 10 MG tablet Take 1 tablet (10 mg) by mouth every 6 hours as needed for nausea or vomiting    Associated Diagnoses: S/P lung transplant (H)      !! QUEtiapine (SEROQUEL) 25 MG tablet 1 tablet (25 mg) by Oral or Feeding Tube route 2 times daily    Associated Diagnoses: S/P lung transplant (H)      !! QUEtiapine (SEROQUEL) 25 MG tablet Take 1 tablet (25 mg) by mouth 3 times daily as needed (agitation)    Associated Diagnoses: S/P lung transplant (H)      !! QUEtiapine (SEROQUEL) 50 MG tablet Take 1 tablet (50 mg) by mouth At Bedtime    Associated Diagnoses: S/P lung transplant (H)      rosuvastatin (CRESTOR) 10 MG tablet 1 tablet (10 mg) by Oral or Feeding Tube route daily    Associated Diagnoses: S/P lung transplant (H)      senna-docusate (SENOKOT-S/PERICOLACE) 8.6-50 MG tablet 1 tablet by Oral or Feeding Tube route nightly as needed for constipation    Associated Diagnoses: S/P lung transplant (H)      sulfamethoxazole-trimethoprim (BACTRIM) 400-80 MG tablet 1 tablet by Oral or Feeding Tube route daily    Associated Diagnoses: S/P lung transplant (H)      tacrolimus (GENERIC EQUIVALENT) 0.5 MG capsule Take 5 capsules (2.5 mg) by mouth every morning    Associated Diagnoses: S/P lung transplant (H)      tacrolimus (GENERIC EQUIVALENT) 1 MG capsule Take 2 capsules (2 mg) by mouth every evening    Associated Diagnoses: S/P lung transplant (H)      warfarin ANTICOAGULANT (COUMADIN) 3 MG tablet Take 0.5 tablets (1.5 mg) by mouth daily    Associated Diagnoses: S/P lung transplant (H)      zinc-white petrolatum (ILEX) 58.3 % external paste Apply 1 g topically every hour as needed for skin protection    Associated Diagnoses: S/P lung transplant (H)       !! - Potential duplicate medications found. Please discuss with  provider.      CONTINUE these medications which have CHANGED    Details   acetaminophen (TYLENOL) 325 MG tablet 3 tablets (975 mg) by Oral or Feeding Tube route every 8 hours    Associated Diagnoses: S/P lung transplant (H)      escitalopram (LEXAPRO) 10 MG tablet 1 tablet (10 mg) by Oral or Feeding Tube route daily    Associated Diagnoses: S/P lung transplant (H)      insulin aspart (NOVOLOG PEN) 100 UNIT/ML pen Inject 1-12 Units Subcutaneous every 4 hours  Qty: 15 mL    Associated Diagnoses: S/P lung transplant (H)      levothyroxine (SYNTHROID/LEVOTHROID) 25 MCG tablet Take 1 tablet (25 mcg) by mouth daily    Associated Diagnoses: S/P lung transplant (H)         CONTINUE these medications which have NOT CHANGED    Details   amLODIPine (NORVASC) 5 MG tablet Take 5 mg by mouth daily      fluticasone-vilanterol (BREO ELLIPTA) 200-25 MCG/INH inhaler Inhale 1 puff into the lungs daily      melatonin 10 MG TABS tablet Take 1 tablet (10 mg) by mouth every evening    Associated Diagnoses: S/P lung transplant (H)         STOP taking these medications       omeprazole (PRILOSEC) 40 MG DR capsule Comments:   Reason for Stopping:         potassium chloride ER (KLOR-CON M) 20 MEQ CR tablet Comments:   Reason for Stopping:         sulfamethoxazole-trimethoprim (BACTRIM DS) 800-160 MG tablet Comments:   Reason for Stopping:             Allergies   No Known Allergies

## 2021-12-13 NOTE — CONSULTS
HOSPITALIST INITIAL CONSULT NOTE    Referring Provider:  Johnny Coronado MD      Reason for Consult     Management     History of Present Illness     Edson Thornton is 57 yo M with PMHx of NSIP/ILD 2/2 Rheumatoid lung disease, rheumatoid arthritis, bronchiectasis, pulmonary HTN, SALTY, essential HTN, HLD, PLD, hypogammaglobinemia, duodenal anomaly, anxiety, and depression is being admitted to  [facility] on 12/13/2021 for ongoing rehabilitation after hospitalization at [facility] from 09/05/2021 to 12/13/2021.  Patient was initially admitted to the hospital on 9/5/2021 with acute on chronic hypoxemic respiratory failure for transplant work-up.  He underwent bilateral lung transplant on 10/16/2021, with post operative course complicated by prolonged ventilator wean s/p trach and PEG/J tube placement with thoracic surgery on 10/29. Post-operative course complicated encephalopathy, subacute CVA, afib with RVR, BRENNAN, candidemia/candida empyema, and brief bradycardic arrest and subsequent hypotension in the setting of gastrointestinal blood loss.  His tracheostomy was decannulated on 12/8/2021. He currently is stable off of oxygen. He is feeding through PEG tube.     Currently reports doing well. No nausea, vomiting, chest pain, shortness of breath, lightheadedness or dizziness.               Past Medical History     Past Medical History:   Diagnosis Date     BRENNAN (acute kidney injury) (H) 10/17/2021     Anxiety      Depression      HLD (hyperlipidemia)      HTN (hypertension)      Hypothyroidism      ILD (interstitial lung disease) (H)      SALTY on CPAP      Oxygen dependent     BL 4L since ~6/2021     Rheumatoid arthritis (H)     signs ~5/2020, dx 5/2021     S/P lung transplant (H) 10/16/2021     Shock liver 10/17/2021     Steroid-induced hyperglycemia      Traction bronchiectasis (H)           Past Surgical History     Past Surgical History:   Procedure Laterality Date     COLONOSCOPY W/ BIOPSIES AND  POLYPECTOMY  07/21/2020     CV CORONARY ANGIOGRAM N/A 09/08/2021    Procedure: Coronary Angiogram with possible intervention;  Surgeon: Jovon Bullock MD;  Location:  HEART CARDIAC CATH LAB     CV RIGHT HEART CATH MEASUREMENTS RECORDED N/A 09/08/2021    Procedure: Right Heart Cath;  Surgeon: Jovon Bullock MD;  Location:  HEART CARDIAC CATH LAB     ESOPHAGOSCOPY, GASTROSCOPY, DUODENOSCOPY (EGD), COMBINED N/A 10/23/2021    Procedure: ESOPHAGOGASTRODUODENOSCOPY (EGD);  Surgeon: Miquel Pisano MD;  Location:  GI     ESOPHAGOSCOPY, GASTROSCOPY, DUODENOSCOPY (EGD), COMBINED N/A 11/02/2021    Procedure: ESOPHAGOGASTRODUODENOSCOPY (EGD);  Surgeon: Daniel Ortiz MD;  Location:  GI     ESOPHAGOSCOPY, GASTROSCOPY, DUODENOSCOPY (EGD), COMBINED N/A 11/5/2021    Procedure: ESOPHAGOGASTRODUODENOSCOPY (EGD);  Surgeon: Ronnell Hernandez MD;  Location:  GI     EXTRACTION(S) DENTAL N/A 09/22/2021    Procedure: EXTRACTION tooth #19;  Surgeon: Deepak Tobin DDS;  Location: [facility] OR     HERNIA REPAIR       IR CHEST TUBE PLACEMENT NON-TUNNELLED LEFT  11/03/2021     PICC TRIPLE LUMEN PLACEMENT Left 11/04/2021    5FR TL PICC. Right non occlusive thrombus subclavian vein.     REPLACE GASTROJEJUNOSTOMY TUBE, PERCUTANEOUS N/A 11/9/2021    Procedure: REPLACEMENT, GASTROJEJUNOSTOMY TUBE, PERCUTANEOUS;  Surgeon: Hernando Rodriguez MD;  Location: [facility] GI     right acl       TRACHEOSTOMY PERCUTANEOUS N/A 10/29/2021    Procedure: Percutaneous Tracheostomy,;  Surgeon: Celine Jenkins MD;  [facility]: [facility]     TRANSPLANT LUNG RECIPIENT SINGLE X2 Bilateral 10/16/2021    Procedure: TRANSPLANT, LUNG, RECIPIENT, BILATERAL, Bronchoscopy, on-pump perfusion, bilateral clamshell sternotomy;  Surgeon: Yanick Corral MD;  Location: [facility] OR     XR ACROMIOCLAVICULAR JOINT BILATERAL            Medications     All his current medications are reviewed in current medication section of [facility].  [place]  medications are reviewed.  No current facility-administered medications for this encounter.     Current Outpatient Medications   Medication     acetaminophen (TYLENOL) 325 MG tablet     acetylcysteine (MUCOMYST) 20 % neb solution     albuterol (PROVENTIL) (2.5 MG/3ML) 0.083% neb solution     bisacodyl (DULCOLAX) 10 MG suppository     calcium carbonate 600 mg-vitamin D 400 units (CALTRATE) 600-400 MG-UNIT per tablet     cefTAZidime (FORTAZ) 2 g vial     cyclobenzaprine (FLEXERIL) 10 MG tablet     escitalopram (LEXAPRO) 10 MG tablet     fluconazole (DIFLUCAN) 400-0.9 MG/200ML-% infusion     fluticasone (FLONASE) 50 MCG/ACT nasal spray     folic acid-vit B6-vit B12 (FOLGARD) 0.8-10-0.115 MG TABS per tablet     furosemide (LASIX) 40 MG tablet     Guar Gum (FIBER MODULAR, NUTRISOURCE FIBER,) packet     hydrOXYzine (ATARAX) 25 MG tablet     insulin aspart (NOVOLOG PEN) 100 UNIT/ML pen     insulin glargine (LANTUS PEN) 100 UNIT/ML pen     levalbuterol (XOPENEX) 1.25 MG/3ML neb solution     levothyroxine (SYNTHROID/LEVOTHROID) 25 MCG tablet     Lidocaine (LIDOCARE) 4 % Patch     lidocaine, viscous, (XYLOCAINE) 2 % solution     loperamide (IMODIUM) 2 MG capsule     magnesium hydroxide (MILK OF MAGNESIA) 400 MG/5ML suspension     magnesium oxide (MAG-OX) 400 MG tablet     magnesium oxide (MAG-OX) 400 MG tablet     melatonin 10 MG TABS tablet     menthol (ICY HOT) 5 % PTCH     multivitamin w/minerals (THERA-VIT-M) tablet     mycophenolate (GENERIC EQUIVALENT) 250 MG capsule     nystatin (MYCOSTATIN) 254463 UNIT/ML suspension     ondansetron (ZOFRAN-ODT) 4 MG ODT tab     oxyCODONE (ROXICODONE) 5 MG tablet     oxymetazoline (AFRIN) 0.05 % nasal spray     pantoprazole (PROTONIX) 40 MG EC tablet     predniSONE (DELTASONE) 10 MG tablet     prochlorperazine (COMPAZINE) 10 MG tablet     QUEtiapine (SEROQUEL) 25 MG tablet     QUEtiapine (SEROQUEL) 25 MG tablet     QUEtiapine (SEROQUEL) 50 MG tablet     rosuvastatin (CRESTOR) 10 MG  tablet     senna-docusate (SENOKOT-S/PERICOLACE) 8.6-50 MG tablet     sulfamethoxazole-trimethoprim (BACTRIM) 400-80 MG tablet     tacrolimus (GENERIC EQUIVALENT) 0.5 MG capsule     tacrolimus (GENERIC EQUIVALENT) 1 MG capsule     warfarin ANTICOAGULANT (COUMADIN) 3 MG tablet     zinc-white petrolatum (ILEX) 58.3 % external paste     Facility-Administered Medications Ordered in Other Encounters   Medication     acetaminophen (TYLENOL) tablet 975 mg     acetylcysteine (MUCOMYST) 20 % nebulizer solution 2 mL     albuterol (PROVENTIL) neb solution 2.5 mg     amLODIPine (NORVASC) tablet 5 mg     bisacodyl (DULCOLAX) Suppository 10 mg     calcium carbonate 600 mg-vitamin D 400 units (CALTRATE) per tablet 1 tablet     cefTAZidime (FORTAZ) 2 g vial to attach to  ml bag for ADULTS or NS 50 ml bag for PEDS     cyclobenzaprine (FLEXERIL) tablet 10 mg     dextrose 10% infusion     glucose gel 15-30 g    Or     dextrose 50 % injection 25-50 mL    Or     glucagon injection 1 mg     escitalopram (LEXAPRO) tablet 10 mg     fiber modular (NUTRISOURCE FIBER) packet 1 packet     fluconazole (DIFLUCAN) intermittent infusion 400 mg in NaCl     fluticasone (FLONASE) 50 MCG/ACT spray 1 spray     folic acid-vit B6-vit B12 (FOLGARD) per tablet 1 tablet     furosemide (LASIX) tablet 40 mg     heparin lock flush 10 UNIT/ML injection 5-20 mL     heparin lock flush 10 UNIT/ML injection 5-20 mL     heparin lock flush 10 UNIT/ML injection 5-20 mL     heparin lock flush 10 UNIT/ML injection 5-20 mL     hydrALAZINE (APRESOLINE) injection 20 mg     hydrOXYzine (ATARAX) tablet 25 mg    Or     hydrOXYzine (ATARAX) tablet 50 mg     insulin aspart (NovoLOG) injection (RAPID ACTING)     insulin glargine (LANTUS PEN) injection 30 Units     labetalol (NORMODYNE/TRANDATE) injection 20 mg     levalbuterol (XOPENEX) neb solution 1.25 mg     levothyroxine (SYNTHROID/LEVOTHROID) tablet 25 mcg     Lidocaine (LIDOCARE) 4 % Patch 2 patch     lidocaine  (XYLOCAINE) 2 % solution 5 mL     lidocaine patch in PLACE     loperamide (IMODIUM) capsule 2 mg     magnesium hydroxide (MILK OF MAGNESIA) suspension 30 mL     magnesium oxide (MAG-OX) tablet 800 mg    And     magnesium oxide (MAG-OX) tablet 400 mg     melatonin tablet 10 mg     menthol (ICY HOT) 5 % patch 1 patch    And     menthol (ICY HOT) Patch in Place     multivitamin w/minerals (THERA-VIT-M) tablet 1 tablet     mycophenolate (GENERIC EQUIVALENT) capsule 1,000 mg     naloxone (NARCAN) injection 0.2 mg    Or     naloxone (NARCAN) injection 0.4 mg    Or     naloxone (NARCAN) injection 0.2 mg    Or     naloxone (NARCAN) injection 0.4 mg     nystatin (MYCOSTATIN) suspension 1,000,000 Units     ondansetron (ZOFRAN-ODT) ODT tab 4 mg    Or     ondansetron (ZOFRAN) injection 4 mg     oxyCODONE (ROXICODONE) tablet 5-10 mg     oxymetazoline (AFRIN) 0.05 % spray 2 spray     pantoprazole (PROTONIX) EC tablet 40 mg     predniSONE (DELTASONE) tablet 10 mg     prochlorperazine (COMPAZINE) injection 10 mg    Or     prochlorperazine (COMPAZINE) tablet 10 mg     QUEtiapine (SEROquel) tablet 25 mg     QUEtiapine (SEROquel) tablet 25 mg     QUEtiapine (SEROquel) tablet 50 mg     rosuvastatin (CRESTOR) tablet 10 mg     senna-docusate (SENOKOT-S/PERICOLACE) 8.6-50 MG per tablet 1 tablet     sodium chloride (PF) 0.9% PF flush 10-20 mL     sodium chloride (PF) 0.9% PF flush 10-20 mL     sodium chloride (PF) 0.9% PF flush 10-40 mL     sodium chloride (PF) 0.9% PF flush 10-40 mL     sodium chloride (PF) 0.9% PF flush 3 mL     sodium chloride (PF) 0.9% PF flush 3 mL     sulfamethoxazole-trimethoprim (BACTRIM) 400-80 MG per tablet 1 tablet     tacrolimus (GENERIC EQUIVALENT) capsule 2 mg     tacrolimus (GENERIC EQUIVALENT) capsule 2.5 mg     warfarin ANTICOAGULANT (COUMADIN) half-tab 1.5 mg     Warfarin Therapy Reminder (Check START DATE - warfarin may be starting in the FUTURE)     zinc-white petrolatum (ILEX) 58.3 % paste     "    Allergies      No Known Allergies     Family History     Family History   Problem Relation Age of Onset     Diabetes Type 1 Mother      Heart Disease Mother      Chronic Obstructive Pulmonary Disease Mother      Rheumatoid Arthritis Father      Emphysema Paternal Grandfather       Mother:   Father:       Social History     Social History     Socioeconomic History     Marital status: Single     Spouse name: Not on file     Number of children: Not on file     Years of education: Not on file     Highest education level: Not on file   Occupational History     Not on file   Tobacco Use     Smoking status: Never Smoker     Smokeless tobacco: Never Used   Substance and Sexual Activity     Alcohol use: Never     Drug use: Never     Sexual activity: Not on file   Other Topics Concern     Parent/sibling w/ CABG, MI or angioplasty before 65F 55M? Not Asked   Social History Narrative     Not on file     Social Determinants of Health     Financial Resource Strain: Not on file   Food Insecurity: Not on file   Transportation Needs: Not on file   Physical Activity: Not on file   Stress: Not on file   Social Connections: Not on file   Intimate Partner Violence: Not on file   Housing Stability: Not on file        Review of Systems   A 10 point review of systems was taken and largely negative except for that which was stated above.      Physical Exam   General:   Vital signs:    There were no vitals taken for this visit.  Estimated body mass index is 25.44 kg/m  as calculated from the following:    Height as of 11/26/21: 1.626 m (5' 4.02\").    Weight as of an earlier encounter on 12/13/21: 67.3 kg (148 lb 4.8 oz).      Constitutional:   Eye: No icterus, no pallor  Mouth/ENT: Normal oral mucosa.   Cardiovascular: LVAD Hum. B/l 2+  Pre tibial edema. Mild JVD  Respiratory: B/L CTA  GI: Soft, NT, BS+ G tube inplace  : No Watson's catheter  Neurology: Alert, awake, and oriented. No tremors  Psych: Mood stable  MSK:  Integumentary: No " rashes on exposed area.   Heme/Lymph/Imm:           Laboratory and Imaging Studies     Laboratory and Imaging studies reviewed in the results review section of Epic. Pertinent studies are as below:    CMP  Recent Labs   Lab 12/13/21  0843 12/13/21  0416 12/13/21  0028 12/12/21 2050 12/12/21  0933 12/12/21  0519 12/11/21  0819 12/11/21  0649 12/10/21  0823 12/10/21  0647 12/09/21  1227 12/09/21  0641 12/08/21  0921 12/08/21  0604 12/07/21  0833 12/07/21  0605   NA  --   --   --   --   --  141  --   --   --  138  --  138  --  139  --  139   POTASSIUM  --   --   --   --   --  4.5  --  4.9  --  4.7  --  4.7  --  4.7  --  4.8   CHLORIDE  --   --   --   --   --  108  --   --   --  103  --  105  --  106  --  107   CO2  --   --   --   --   --  26  --   --   --  27  --  28  --  27  --  25   ANIONGAP  --   --   --   --   --  7  --   --   --  8  --  5  --  6  --  7   GLC 89 268* 124* 138*   < > 167*   < >  --    < > 203*   < > 153*   < > 177*   < > 174*   BUN  --   --   --   --   --  34*  --   --   --  30  --  28  --  35*  --  31*   CR  --   --   --   --   --  1.00  --   --   --  0.69  --  0.64*  --  0.70  --  0.68   GFRESTIMATED  --   --   --   --   --  84  --   --   --  >90  --  >90  --  >90  --  >90   ERIK  --   --   --   --   --  8.6  --   --   --  9.0  --  9.1  --  8.9  --  8.8   MAG  --   --   --   --   --  1.5*  --   --   --  1.6  --   --   --  1.7  --  1.9   PHOS  --   --   --   --   --  3.7  --  3.4  --  3.6  --   --   --  2.9  --  3.4   PROTTOTAL  --   --   --   --   --  5.8*  --   --   --  5.9*  --   --   --  5.7*  --  5.8*   ALBUMIN  --   --   --   --   --  2.5*  --   --   --  2.5*  --   --   --  2.3*  --  2.3*   BILITOTAL  --   --   --   --   --  0.2  --   --   --  0.5  --   --   --  0.2  --  <0.1*   ALKPHOS  --   --   --   --   --  104  --   --   --  105  --   --   --  107  --  106   AST  --   --   --   --   --  17  --   --   --  19  --   --   --  17  --  22   ALT  --   --   --   --   --  26  --   --   --  24  --    --   --  25  --  27    < > = values in this interval not displayed.     CBC  Recent Labs   Lab 12/12/21  0519 12/10/21  0647 12/09/21  0641 12/08/21  0604   WBC 9.7 9.0 9.2 8.5   RBC 3.30* 3.15* 3.15* 2.96*   HGB 9.8* 9.3* 9.5* 8.8*   HCT 30.6* 29.2* 29.3* 27.8*   MCV 93 93 93 94   MCH 29.7 29.5 30.2 29.7   MCHC 32.0 31.8 32.4 31.7   RDW 14.2 14.3 14.6 14.5   * 141* 142* 118*     INR  Recent Labs   Lab 12/13/21  0539 12/12/21  0519 12/11/21  0649 12/10/21  0647   INR 2.57* 2.52* 2.24* 2.18*     Arterial Blood GasNo lab results found in last 7 days.       Impression/Recommendations     55 yo M with PMHx of NSIP/ILD 2/2 Rheumatoid lung disease, rheumatoid arthritis, bronchiectasis, pulmonary HTN, SALTY, essential HTN, HLD, PLD, hypogammaglobinemia, duodenal anomaly, anxiety, and depression is being admitted to  [facility] on 12/13/2021 for ongoing rehabilitation after hospitalization at [facility] from 09/05/2021 to 12/13/2021.  Patient was initially admitted to the hospital on 9/5/2021 with acute on chronic hypoxemic respiratory failure for transplant work-up.  He underwent bilateral lung transplant on 10/16/2021, with post operative course complicated by prolonged ventilator wean s/p trach and PEG/J tube placement with thoracic surgery on 10/29. Post-operative course complicated encephalopathy, subacute CVA, afib with RVR, BRENNAN, candidemia/candida empyema, and brief bradycardic arrest and subsequent hypotension in the setting of gastrointestinal blood loss.  His tracheostomy was decannulated on 12/8/2021. He currently is stable off of oxygen. He is feeding through PEG tube.      ILD and NSIP s/p BSLT on 10/16/2021  Acute on chronic hypoxic respiratory failure s/p tracheostomy on 10/29/21 and decannulation on 12/8/2021  Bilateral pleural effusions, improved  * Transplant pulmonology consultation appreciated  * Immunosuppression: s/p basiliximab on 10/16 and 10/20.On tacrolimus, MMF, prednisone  per transplant pulmonary medicine recommendations  * Prophylaxis: On bactrim 400-80 mg daily, nystatin QID x6 months  - Continue immunosuppression and prophylaxis as above  - DSA every 2 weeks   - Levalbuterol and mucomyst QID and pulm toilet and chest PT QID  - PT/OT/SLP   -Continue 40 mg of Lasix daily  - Oxycodone 5-10 mg Q4H prn, would aggressively taper this over the course of the next week to 2 weeks  - Schedule tylenol     # Left lung cavitary lesion   Suspect aspiration vs pseudomonal vs Klebsiella pneumonia related abscess. CT on 10/25 with LLL nodular opacity. Repeat CT chest on 11/22 with new cavitary lesion in the left lung base, and with multifocal bilateral upper lobe GGO (some of which are new), new 1.8 cm fluid collection with surrounding fat stranding in the left axilla, decreased bilateral loculated pleural effusions. Indeterminate Quant Gold, but negative tuberculin skin tests on mulitple occurences. S/p BAL on 11/22. ID feels less likely fungal. BD glucan positive but has known candidemia. Cocci antigen in urine negative, serum histoplasmosis negative, CRAG negative.  Appreciate transplant infectious disease consultation  - IV Ceftazdime 2 grams Q8H (until 12/21)  - Follow up CT chest in 4 weeks on 12/21  - Please include in the patient's discharge instructions, follow up with Dr. Abebe on 12/21/2021 @6:30 PM in a virtual visit.      # Klebsiella pneumoniae and Pseudomonas fluorescens/putida HAP  Klebsiella initially noted trach sputum culture 11/10. Bronch culture 11/12 with Klebsiella and Pseudomonas fluorescens/putida (R-meropenem).     - Treat as above      # Invasive candidiasis with Candida albicans  Positive candida BCx 10/20 and 10/22.  BDG fungitell positive (399) on 10/20. Sputum Cx + Candida albicans persistently.  TC 10/23 without evidence of endocarditis. Ophthalmology consult 10/24 with benign dilated fundoscopic exam.  Candida empyema also noted 10/25, chest tubes inadvertently  removed by CVTS 10/28, left chest tube replaced by IR 11/3 as above with ongoing Candida on cultures. Repeat pleural fungal cultures and fungal/bacterial blood cultures on 11/18 NGTD.   * No longer has any chest tubes in place  * Initially on Micafungin (10/22-10/27) transition to fluconazole 400 mg IV daily (start date 10/26 end date 12/21)  - Continue Fluconazole 400 mg IV daily     # Hypogammaglobinemia  Received IVIG with plan to repeat IgG on 12/15.    # Encephalopathy, resolved  # Difficulty sleeping  Likely multi-factorial in the setting of stroke, prolonged critical illness.  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 MRI brain on 10/23 with multifocal subacute infarct within both cerebral hemispheres and left cerebellum. Presumed embolic with afib hx. He is currently able to ambulate with relative ease.  - Anticoagulation as noted below.     # Atrial fibrillation  [id number] for HTN, CVA, and DM2. First noted on 10/18, started on amiodarone drip, and converted to NSR. Metoprolol and amidoarone discontinued on 11/20 due to intermittent bradycardia.   - Anticoagulation with warfarin as noted below.  - Continue Rosuvastatin 10 mg daily.     # Right subclavian DVT   Diagnosed on 11/4 on ultrasound.  - Continue warfarin per pharmacy protocol     # DM2 with steroid induced hyperglycemia  Hemoglobin A1c 6.6 pre-operatively. Endocrine recently consulted for steroids induced hyperglycemia.   - Continue Lantus 30 mg BID  - Novolog High Sliding scale insulin Q4H; once patient is off tube feeds could transition to 4 times daily with meals.  -Blood glucose Q4H; once patient is off tube feeds could transition to 4 times daily with meals.     # Severe malnutrition in the context of acute on chronic illness  Patient  currently has a PEG J-tube and is getting the Jorde of nutrition through cycle tube feeds.  -Multivitamin, folic acid, B6, B12 supplements   - TF per nutrition; continue oral diet as well  -Hopefully over the next couple of weeks we can decrease tube feeds and allow the patient to eat more food by mouth.  Currently on cycle tube feeds.     # Anxiety and depression   Psychiatry reconsulted and after discussion suggest increasing lexapro. Will discuss with patient regarding starting ritalin in the AM for motivation.  - Lexapro increased to 10 mg daily could increase to 20 in a week ~12/12.  -As needed hydroxyzine.     # Concern for chipped tooth  # Poor dental hygiene  Patient noted he feels like he may have chipped his tooth or has a loose tooth after eating guevara. It is not painful.   - Dental hygiene consult placed and chipped tooth stable rec oral hygiene cares.     # Rheumatoid arthritis- Dx 5/2021 with + CCP antibody and RF. Previously treated with rituximab. Rheum consulted early in admission. On steroids as noted above.   # SALTY - Uses CPAP at bedtime prior to admission.  # HTN- Continue PTA amlodipine.   # Hypothyroidism - TSH 3.17 on 11/19/21. Continue PTA levothyroxine 25 mcg daily.     # Recurrent GIB - hgb drop on 10/22 s/p 2 unit pRBC transfusion with EGD on 10/23 with NJ/OG tube trauma with scant oozing. Patient then developed progressive hypotension and ultimately CODE BLUE for GIB in setting of anticoagulation with heparin drip, s/p massive transfusion protocol. EGD on 11/2 with large amount of clotted blood in stomach and area of raised mucosa with small adherent clot near PEG tube site that was clipped, no active bleeding.  Maroon stools 11/3, repeat EGD with extensive old blood in stomach, no active bleeding, small nodular area with prior clips clipped again.  Most recent EGD 11/5 with ulcer noted at PEG tube bumper site, gastritis, and suction marks from G tube. TF noted in G tube  drainage bag 11/7, AXR with GJ tube tip projecting over proximal duodenum. GJ tube exchanged 11/9 by GI.  - PO PPI BID      # HSV  Chronic intermittent active infection pre-transplant with recent HSV infection: crusted lesions throughout left side of jaw, s/p 10 day treatment course of ACV through 10/9.  HSV PCR blood negative 10/17.  S/p ACV ppx as above (started POD #1 instead of POD #8 given HSV history and location).     # Diet:  Tube feeds and regular diet  # DVT Prophylaxis: Heparin SQ  # Watson Catheter: Not present  # Central Lines: None  # Code Status: Full Code              Maury Lucero  Internal Medicine/Hospitalist  [facility]  [phone]

## 2021-12-13 NOTE — PLAN OF CARE
Pt admitted 9/5 from OSH for acute on chronic respiratory failure 2/2 ILD exacerbation, now s/p BSLT on 10/16/21 c/b prolonged vent wean s/p trach (removed 12/8) and PEG/J tube (10/29). Other complications include encephalopathey, CVA, AFib with RVR, BRENNAN, GIB, candidemia/candida empyema, and anxiety. PMH includes ILD, bronchiectasis, pulm HTN, RA, SALTY, chronic HSV infection, hypogammaglobulinemia, steroid-induced DM, hypothyroidism, PFO, HTN, HLD, duodenal anomaly, and depression.      Neuro: A&O x 4. Pleasant affect. Calls appropriately. Slept well overnight. Gave PRN hydroxyzine 1x.  Cardiac: SR 80s-90s. SBP 110s-130s. Denies dizziness, chest pain, or palpitations  Respiratory: Sating well on RA. LS clear. MYERS.   GI/: Good UOP. Last BM 12/12. PEG/J tube clamped.   Endocrine: Q4H BG checks overnight with TF.    Diet/Appetite: Regular. Low appetite. Cycled TF off at 0600.   Skin: Trach site dressing CDI.  LDA: L TL PICC HL.  Activity: SBA.   Pain: Denies. Refused scheduled Tylenol     Plan: Transfer to TCU at 1200. Continue to monitor and alert team to any changes or concerns.

## 2021-12-13 NOTE — PLAN OF CARE
Shift summary [id number]    D:Pt is s/p bilateral lung transplant with extended hospitalization d/t multiple complications(see MD notes)  A/I: Pt is doing better. Pt is ready for ARU and is planned for transfer tomorrow at noon. Pt is alert and oriented, sating well on RA. Pt does become extremely SOB with activity and requires  significant rests periods for only short bursts of movement. Pt did have shower and shampoo yesterday and tolerated with rests between movements. Pt's body has multiple bruises and scabs but incisions healing. Pt has peg tube which has cycled TF running at 80/hr from 6 P to 6 A. G tube clamped. Pt has Q 4 hr FSBG. Pt has not required any sliding scale coverage. Pt's appetite poor but taking supplemental boost drinks, had 2 this shift. Pt voiding in urinal independently. Pt has TL PICC HL. Pt denies any c/o pain but is on scheduled tylenol and flexeril.  P: Continue current POC anticipate transfer to ARU tomorrow at 1200.

## 2021-12-13 NOTE — PROGRESS NOTES
Pulmonary Medicine  Cystic Fibrosis - Lung Transplant Team  Progress Note  2021       Patient: Edson Thornton  MRN: [id number]  : 1965 (age 56 year old)  Transplant: 10/16/2021 (Lung), POD#58  Admission date: 2021    Assessment & Plan:     Edson Thornton is a 56 year old male with a PMH significant for NSIP/ILD, bronchiectasis, moderate PH, RA, SALTY, chronic HSV infection, hypogammaglobulinemia, steroid-induced diabetes, hypothyroidism, PFO, HTN, HLD, duodenal anomaly, anxiety, and depression.  Admitted on 21 from OSH for acute on chronic respiratory failure 2/2 ILD exacerbation, now s/p BSLT on 10/16/21. Prolonged vent wean s/p trach (10/29-) and PEG/J tube (10/29). Post-op course also complicated by encephalopathy and diffuse weakness, acute to subacute CVA, afib with RVR, BRENNAN, GI bleed, Candidemia/Candida empyema, and anxiety.  Code called 11/2 for bradycardia/asystole, progressive hypotension, found to have significant GI bleed, EGD with adherent clot near PEG tube site that was clipped.  Improving clinically, plan for discharge to ARU today.     Discharge recommendations:  - Repeat CXR  then qMonday for monitoring  - CMP, Mg++, CBC qMTh minimally   - Tacrolimus level qMTh  - Prednisone taper due 22   - CMV, EBV, and IgG due on 12/15  - DSA () pending, monitoring every 2 weeks  - Coccidioides Ag (blood) due , monitoring monthly   - Continue ceftazidime and fluconazole through  per transplant ID (will need transplant ID follow up and chest CT around )  - Evaluate need for BiPAP after trach site has healed    S/p BSLT for ILD:  Acute hypoxic respiratory failure, Resolved:   Prolonged vent weaning s/p trach, Resolved:  Bilateral pleural effusions: Explant pathology with NSIP, no malignancy.  Prolonged vent wean s/p trach and PEG/J tube placement with thoracic surgery 10/29.  Code called 11/2 for bradycardia/asystole (required 1 round of CPR, no  medications) then progressive hypotension, GI bleed as below.  S/p left chest tube placement (11/3-11/30) for pleural effusion/empyem s/p lytics 11/25-11/28 (CXR showing trapped left lung), right thoracentesis 11/27 (cultures negative).  Chest CT (11/22) with new LLL cavitary lesion with multifocal GGO in BUL, new 1.8 cm fluid collection with surrounding fat stranding in the left axilla, decreased bilateral loculated pleural effusions, and similar small pericardial effusion.  Hypoxia resolved 12/1, trach decannulated 12/8.  CXR 12/10 with slight increase in right pleural effusion and unchanged loculated left pleural effusion, stable LLL cavitary nodule.  - Nebs: levalbuterol and Mucomyst TID (increased 12/8) d/t chest tightness and dyspnea with tenacious secretions  - Pulmonary toilet with chest physiotherapy BID  - Volume management per primary team  - Repeat CXR 12/16 then qMonday for monitoring  - CMP, Mg++, CBC qMTh minimally      Immunosuppression: s/p induction therapy with basiliximab 10/16 (and high dose IV steroid) and 10/20  - Tacrolimus 2.5 mg qAM / 2 mg qPM (decreased 12/11).  Goal level 8-12.  Repeat level 12/16 for qMTh monitoring.  - MMF 1000 mg BID (decreased 11/2 given GI bleed, AZA to be avoided given TPMT)  - Prednisone 10 mg BID, next taper due 1/2/22 (not yet ordered)  Date AM dose (mg) PM dose (mg)   12/5/21 10 10   1/2/22 10 7.5   1/30/22 7.5 7.5   2/27/22 7.5 5   3/27/22 5 5   4/24/22 5 2.5      Prophylaxis:   - Bactrim for PJP ppx (held 11/2-11/5 d/t BRENNAN)  - Nystatin for oral candidiasis ppx, 6 month course  - See below for serologies and viral ppx:    Donor Recipient Intervention   CMV status Negative Negative None, CMV monthly   EBV status Positive Positive None, EBV monthly    HSV status N/A Positive S/p acyclovir POD #1-30 (recent infection history pre-txp)      Positive cross match:   Positive DSA: Note that he received two doses of rituximab in June, which is likely contributing to  cross match result.  DSA newly positive 11/29 with DQB5 (mfi 2522), decreased (mfi 1873) on 12/6  - Repeat DSA (12/12) pending, monitoring every 2 weeks     ID: Concern for possible Strongyloides exposure pre-transplant s/p ivermectin x 1 dose (9/17). Donor and recipient cultures NGTD. S/p IV ceftazidime/vancomycin for 48h per protocol and additional empiric ceftazidime 10/19-10/23 given recurrent fevers as below. Cryptococcal Ag negative 10/29. Reports some blood mixed in sputum 12/3, persisting intermittently.   - Bacterial (12/5) and fungal (12/4) sputum cultures NGTD  - Monthly blood Coccidioides Ag x12 months post-transplant per ID (urine and serum negative 11/17), due 12/17 (ordered)     Klebsiella pneumoniae:  Pseudomonas fluorescens/putida:   LLL cavity: Klebsiella initially noted trach sputum culture 11/10. Started on ceftriaxone 11/11, transitioned to ertapenem 11/12-11/13, and back to ceftriaxone 11/14.  Bronch culture 11/12 with Klebsiella and Pseudomonas fluorescens/putida (R-meropenem).  Chest CT 11/22 with LLL cavity as above, BAL with Klebsiella pneumoniae. Histo Ag 11/22, 11/23 and Blast Ag 11/22 negative. S/p Zosyn (11/15-11/23) and levofloxacin (11/23-12/7).   - ABX: ceftazidime (11/23-12/21) per transplant ID  - Transplant ID follow up scheduled 12/21 with repeat chest CT prior      Disseminated Candida: Noted on blood cultures 10/20 and 10/22.  BDG fungitell positive (399) on 10/20.  Respiratory cultures with persistent Candida albicans.  TC 10/23 without evidence of endocarditis.  Ophthalmology consult 10/24 with benign dilated fundoscopic exam.  Candida empyema also noted 10/25, chest tubes inadvertently removed by CVTS 10/28, left chest tube replaced by IR 11/3 as above with ongoing Candida on cultures (11/3, 11/18).  BDG fungitell positive (>500) and Aspergillus galactomannan negative 11/22.  - Fluconazole (10/26-12/21) per transplant ID      HSV: Chronic intermittent active  infection pre-transplant with recent HSV infection: crusted lesions throughout left side of jaw, s/p 10 day treatment course of ACV through 10/9.  HSV PCR blood negative 10/17.  S/p ACV ppx as above (started POD #1 instead of POD #8 given HSV history and location).     Hypogammaglobulinemia: IgG previously low at 364 (9/7).  Noted at 265 at time of transplant, s/p IVIG 10/21, repeat IgG (11/17) 198, s/p IVIG (with premedication) 11/18.  - Repeat IgG 12/15 (ordered)      SALTY: Noted pre-transplant.  Home CPAP 6-12 cm H2O.  - Evaluate need for BiPAP after trach site has healed     Other relevant problems being managed by primary team:     Acute to subacute embolic CVA:   Encephalopathy and diffuse weakness, Improving: Stroke code 10/22 d/t limited movement of BLE, CT head with infarcts in bilateral cerebral hemispheres and left cerebella hemisphere (presumed embolic), no acute intracranial hemorrhage.  MRI 10/23 with multifocal subacute infarcts within both cerebral hemispheres and left cerebellum.  DDx include surgery v embolic v infectious.  Heparin drip started 10/23 (intermittently held with GI bleed as below).  Repeat stroke code 10/25 d/t marked decrease in responsiveness with sedation wean, pupil inequality, and absent gag reflex.  CTA head without obvious new pathology, MRI brain (with and without contrast) primarily revealing for infarct, low likelihood of PRES.  Ammonia normal.  VEEG per neuro with severe diffuse encephalopathy.  Ongoing improvement since 10/29, repeat head CT 11/2 (following code) without new acute intracranial abnormalities.  - AC management per primary team  - PT/OT      Afib with RVR: Noted 10/18, started on amiodarone drip and converted to SR, transitioned to PO 10/21, decreased 10/29.  Metoprolol and amiodarone discontinued 11/19 d/t intermittent bradycardia.  AC per primary team.      Right subclavian DVT: Seen on UE US from 11/4.  Heparin drip resumed 11/5, transitioned to warfarin  "11/20 and in the setting of thrombocytopenia.  HIT negative 11/21.     Hypomagnesemia: Suppressed reabsorption 2/2 CNI.  Requiring intermittent IV and PO replacement.  - Mag-Ox 800 mg qAM / 400 mg qPM (increased 12/8)    We appreciate the excellent care provided by the [place] [id number] team.  Recommendations communicated via telephone and this note.  Will continue to follow along closely, please do not hesitate to call with any questions or concerns.    Patient discussed with Dr. Sifuentes.    Keyla Rice PA-C  Inpatient PRINCESS  Pulmonary CF/Transplant     Subjective & Interval History:     Slept poorly.  Remains on RA, breathing fees tight/restricted, no significant change over the past couple days.  No cough or sputum production.  Denies chest pain.  Appetite remains poor, on cycled TF.  Adequate urine output with diuresis.      Review of Systems:     C: No fever, no chills, no change in weight  INTEGUMENTARY/SKIN: No rash or obvious new lesions  ENT/MOUTH: No sore throat, no sinus pain, no nasal congestion or drainage  RESP: See interval history  CV: No palpitations, no peripheral edema  GI: No nausea, no vomiting, no change in stools, no reflux symptoms  : No dysuria  MUSCULOSKELETAL: No myalgias, no arthralgias  ENDOCRINE: Blood sugars intermittently elevated  NEURO: No headache, no numbness or tingling  PSYCHIATRIC: Mood stable    Physical Exam:     Vital signs:  Temp: 98.1  F (36.7  C) Temp src: Oral BP: 137/84 Pulse: 90   Resp: 16 SpO2: 98 % O2 Device: None (Room air) Oxygen Delivery: 2 LPM Height: 162.6 cm (5' 4.02\") Weight: 67.3 kg (148 lb 4.8 oz)  I/O:     Intake/Output Summary (Last 24 hours) at 12/13/2021 1620  Last data filed at 12/13/2021 1310  Gross per 24 hour   Intake 720 ml   Output 1530 ml   Net -810 ml     Constitutional: Lying in bed, in no apparent distress.   HEENT: Eyes with pink conjunctivae, anicteric.  Oral mucosa moist without lesions.  Prior trach site covered with dressing.  PULM: " Mildly diminished air flow to bases bilaterally.  No crackles, no rhonchi, no wheezes.  Non-labored breathing on RA.  CV: Normal S1 and S2.  RRR.  No murmur, gallop, or rub.  No peripheral edema.   ABD: NABS, soft, nontender, nondistended.  PEG/J tube site not visualized.   MSK: Moves all extremities.  No apparent muscle wasting.   NEURO: Sleeping, easily wakes to voice, answering questions appropriately.   SKIN: Warm, dry.  No rash on limited exam.   PSYCH: Flat affect, calm.     Lines, Drains, and Devices:  PICC Triple Lumen 11/04/21 Left Basilic Access. PICC okay to use. (Active)   Site Assessment WDL except;Ecchymotic 12/13/21 0837   External Cath Length (cm) 2.5 cm 12/11/21 1219   Extremity Circumference (cm) 28 cm 11/28/21 1038   Dressing Intervention Chlorhexidine patch;Transparent 12/13/21 0837   Dressing Change Due 12/18/21 12/13/21 0000   PICC Comment CDI 12/13/21 0000   Gray - Status heparin locked 12/13/21 0837   [place] - Cap Change Due 12/14/21 12/13/21 0000   Red - Status heparin locked 12/13/21 0837   Red - Cap Change Due 12/14/21 12/13/21 0000   White - Status heparin locked 12/13/21 0837   White - Cap Change Due 12/14/21 12/13/21 0000   Extravasation? No 12/13/21 0000   Line Necessity Yes, meets criteria 12/13/21 0837   Number of days: 39     Data:     LABS    CMP:   Recent Labs   Lab 12/13/21  1202 12/13/21  0843 12/13/21  0416 12/13/21  0028 12/12/21  0933 12/12/21  0519 12/11/21  0819 12/11/21  0649 12/10/21  0823 12/10/21  0647 12/09/21  1227 12/09/21  0641 12/08/21  0921 12/08/21  0604 12/07/21  0833 12/07/21  0605   NA  --   --   --   --   --  141  --   --   --  138  --  138  --  139  --  139   POTASSIUM  --   --   --   --   --  4.5  --  4.9  --  4.7  --  4.7  --  4.7  --  4.8   CHLORIDE  --   --   --   --   --  108  --   --   --  103  --  105  --  106  --  107   CO2  --   --   --   --   --  26  --   --   --  27  --  28  --  27  --  25   ANIONGAP  --   --   --   --   --  7  --   --   --  8  --   5  --  6  --  7   * 89 268* 124*   < > 167*   < >  --    < > 203*   < > 153*   < > 177*   < > 174*   BUN  --   --   --   --   --  34*  --   --   --  30  --  28  --  35*  --  31*   CR  --   --   --   --   --  1.00  --   --   --  0.69  --  0.64*  --  0.70  --  0.68   GFRESTIMATED  --   --   --   --   --  84  --   --   --  >90  --  >90  --  >90  --  >90   ERIK  --   --   --   --   --  8.6  --   --   --  9.0  --  9.1  --  8.9  --  8.8   MAG  --   --   --   --   --  1.5*  --   --   --  1.6  --   --   --  1.7  --  1.9   PHOS  --   --   --   --   --  3.7  --  3.4  --  3.6  --   --   --  2.9  --  3.4   PROTTOTAL  --   --   --   --   --  5.8*  --   --   --  5.9*  --   --   --  5.7*  --  5.8*   ALBUMIN  --   --   --   --   --  2.5*  --   --   --  2.5*  --   --   --  2.3*  --  2.3*   BILITOTAL  --   --   --   --   --  0.2  --   --   --  0.5  --   --   --  0.2  --  <0.1*   ALKPHOS  --   --   --   --   --  104  --   --   --  105  --   --   --  107  --  106   AST  --   --   --   --   --  17  --   --   --  19  --   --   --  17  --  22   ALT  --   --   --   --   --  26  --   --   --  24  --   --   --  25  --  27    < > = values in this interval not displayed.     CBC:   Recent Labs   Lab 12/12/21  0519 12/10/21  0647 12/09/21  0641 12/08/21  0604   WBC 9.7 9.0 9.2 8.5   RBC 3.30* 3.15* 3.15* 2.96*   HGB 9.8* 9.3* 9.5* 8.8*   HCT 30.6* 29.2* 29.3* 27.8*   MCV 93 93 93 94   MCH 29.7 29.5 30.2 29.7   MCHC 32.0 31.8 32.4 31.7   RDW 14.2 14.3 14.6 14.5   * 141* 142* 118*       INR:   Recent Labs   Lab 12/13/21  0539 12/12/21  0519 12/11/21  0649 12/10/21  0647   INR 2.57* 2.52* 2.24* 2.18*       Glucose:   Recent Labs   Lab 12/13/21  1202 12/13/21  0843 12/13/21  0416 12/13/21  0028 12/12/21  2050 12/12/21  1555   * 89 268* 124* 138* 109*       Blood Gas: No lab results found in last 7 days.    Culture Data No results for input(s): CULT in the last 168 hours.    Virology Data:   Lab Results   Component Value Date     FLUAH1 Negative 11/22/2021    FLUAH3 Negative 11/22/2021    LU9053 Negative 11/22/2021    IFLUB Negative 11/22/2021    RSVA Negative 11/22/2021    RSVB Negative 11/22/2021    PIV1 Negative 11/22/2021    PIV2 Negative 11/22/2021    PIV3 Negative 11/22/2021    HMPV Negative 11/22/2021    HRVS Negative 11/22/2021    ADVBE Negative 11/22/2021    ADVC Negative 11/22/2021    ADVC Negative 11/12/2021    ADVC Negative 10/17/2021       Historical CMV results (last 3 of prior testing):  Lab Results   Component Value Date    CMVQNT Not Detected 11/22/2021    CMVQNT Not Detected 11/16/2021    CMVQNT Not Detected 11/12/2021     No results found for: CMVLOG    Urine Studies    Recent Labs   Lab Test 11/01/21  1336 10/25/21  1507   URINEPH 5.5 5.5   NITRITE Negative Negative   LEUKEST Negative Negative   WBCU 0 2       Most Recent [place] Pulmonary Function Testing (FVC/FEV1 only)  No results found for: 20002  No results found for: 20003  No results found for: 20015  No results found for: 20016    IMAGING    No results found for this or any previous visit (from the past 48 hour(s)).

## 2021-12-13 NOTE — PHARMACY-ANTICOAGULATION SERVICE
Clinical Pharmacy - Warfarin Dosing Consult     Pharmacy has been consulted to manage this patient s warfarin therapy.  Indication: Atrial Fibrillation  Therapy Goal: INR 2-3  Warfarin Prior to Admission: Yes  Warfarin PTA Regimen: See [facility] MAR  Significant drug interactions: fluconazole, bactrim (reduced warfarin clearance, increased INR)  Recent documented change in oral intake/nutrition: Unknown  Dose Comments: 1.5mg today    INR   Date Value Ref Range Status   12/13/2021 2.57 (H) 0.85 - 1.15 Final   12/12/2021 2.52 (H) 0.85 - 1.15 Final       Recommend warfarin 1.5 mg today.  Pharmacy will monitor Edson Thornton daily and order warfarin doses to achieve specified goal.      Please contact pharmacy as soon as possible if the warfarin needs to be held for a procedure or if the warfarin goals change.      David Siddiqi, PharmD

## 2021-12-13 NOTE — PROGRESS NOTES
DISCHARGE   Discharged to: ARU  Via: Wheelchair  Accompanied by: Family  Discharge Instructions: diet, activity, medications, follow up appointments, when to call the MD, and what to watchout for (i.e. s/s of infection, increasing SOB, palpitations, chest pain,)  Prescriptions: To be filled by   ARU   pharmacy per pt's request; medication list reviewed & sent with pt  Follow Up Appointments: arranged; information given  Belongings: All sent with pt  IV: in place. Will be used for antibiotics  Report called.   Telemetry: off  Pt exhibits understanding of above discharge instructions; all questions answered.  Discharge Paperwork: faxed    Opal Yi RN

## 2021-12-13 NOTE — PHARMACY-CONSULT NOTE
Pharmacy Tube Feeding Consult    Medication reviewed for administration by feeding tube and for potential food/drug interactions.    Recommendation: No changes are needed at this time. Patient able to take medications PO     Pharmacy will continue to follow as new medications are ordered.

## 2021-12-13 NOTE — H&P
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  Postoperative course was initially complicated by prolonged vent wean s/p trach and PEG placement with CT surgery on 10/29.  Patient developed significant encephalopathy and diffuse bilateral upper extremity weakness, found to have multifocal acute to subacute CVA of bilateral frontal and occipital lobes, thought to be embolic versus perioperative in nature.  Hospitalization was further complicated by new Afib with RVR, BRENNAN, candidemia with Candida empyema currently on IV fluconazole, hospital-acquired Klebsiella/Pseudomonas pneumonia currently on IV ceftazidime CODE BLUE associated with bradycardia/asystole and significant GI bleed requiring massive transfusion protocol, s/p EGD and clipping with adherent clot near PEG site.  Patient was transferred from ICU to medicine on 11/23/2021 and decannulated 12/8/2021.  During hospitalization, rheumatology, CT surgery, cardiology, palliative care, dental, ID, pulmonology, neurology, GI, ophthalmology, ENT, nephrology, endocrine, psychiatry all consulted.    During his acute hospitalization, patient was seen and evaluated by PT, OT, SLP and PM&R consult service.  All specialties collectively recommended that patient would benefit from ongoing therapies in the acute inpatient rehabilitation setting.     Functionally, the patient is ambulating  20'+10'+15' x 2 with  "FWW and WC follow he has significant dyspnea on exertion requiring cues for pacing, posture, breathing mechanics.  He is able to for perform 2 minutes 45 seconds of aerobic activity on the NuStep at level 1.  He has significant deficits with right upper extremity coordination, dexterity, strength as well as tremors which impact his ADLs.  He would benefit from a cognitive assessment as well as visit visual testing by OT in the setting of CVA.  Performs grooming with set up assist seated, standing tasks with CGA and multiple seated rests due to dyspnea.  He requires approximately 40 minutes to take a shower including 10 minutes rest after walking to and exiting the shower.    Currently, the patient is medically appropriate and is assessed to have needs and will benefit from an inpatient acute rehabilitation comprehensive program working with PT, OT and SLP, and will also benefit from supervision and management of Rehab Nursing and Rehab MD.     Patient reports significant difficulty falling asleep due to his \"mind racing\" and processing all of the events during his hospitalization which he just recently learned about.  He is encouraged that he does not require any oxygen at this time, though has continued to have significant dyspnea with exertion, with sats in the high 80s to low 90s.  He otherwise feels well and denies chest pain, abdominal pain, fever/chills.  He does have some tingling in his fingertips, which has been there for some time, and a longstanding left visual field deficit that has been present since childhood. Does not have any trouble with bowel/bladder    PAST MEDICAL HISTORY  Past Medical History:   Diagnosis Date     BRENNAN (acute kidney injury) (H) 10/17/2021     Anxiety      Depression      HLD (hyperlipidemia)      HTN (hypertension)      Hypothyroidism      ILD (interstitial lung disease) (H)      SALTY on CPAP      Oxygen dependent     BL 4L since ~6/2021     Rheumatoid arthritis (H)     signs " ~5/2020, dx 5/2021     S/P lung transplant (H) 10/16/2021     Shock liver 10/17/2021     Steroid-induced hyperglycemia      Traction bronchiectasis (H)        SURGICAL HISTORY  Past Surgical History:   Procedure Laterality Date     COLONOSCOPY W/ BIOPSIES AND POLYPECTOMY  07/21/2020     CV CORONARY ANGIOGRAM N/A 09/08/2021    Procedure: Coronary Angiogram with possible intervention;  Surgeon: Jovon Bullock MD;  Location:  HEART CARDIAC CATH LAB     CV RIGHT HEART CATH MEASUREMENTS RECORDED N/A 09/08/2021    Procedure: Right Heart Cath;  Surgeon: Jovon Bullock MD;  Location:  HEART CARDIAC CATH LAB     ESOPHAGOSCOPY, GASTROSCOPY, DUODENOSCOPY (EGD), COMBINED N/A 10/23/2021    Procedure: ESOPHAGOGASTRODUODENOSCOPY (EGD);  Surgeon: Miquel Pisano MD;  Location:  GI     ESOPHAGOSCOPY, GASTROSCOPY, DUODENOSCOPY (EGD), COMBINED N/A 11/02/2021    Procedure: ESOPHAGOGASTRODUODENOSCOPY (EGD);  Surgeon: Daniel Ortiz MD;  Location:  GI     ESOPHAGOSCOPY, GASTROSCOPY, DUODENOSCOPY (EGD), COMBINED N/A 11/5/2021    Procedure: ESOPHAGOGASTRODUODENOSCOPY (EGD);  Surgeon: Ronnell Hernandez MD;  [facility]: [facility]     EXTRACTION(S) DENTAL N/A 09/22/2021    Procedure: EXTRACTION tooth #19;  Surgeon: Deepak Tobin DDS;  [facility]     HERNIA REPAIR       IR CHEST TUBE PLACEMENT NON-TUNNELLED LEFT  11/03/2021     PICC TRIPLE LUMEN PLACEMENT Left 11/04/2021    5FR TL PICC. Right non occlusive thrombus subclavian vein.     REPLACE GASTROJEJUNOSTOMY TUBE, PERCUTANEOUS N/A 11/9/2021    Procedure: REPLACEMENT, GASTROJEJUNOSTOMY TUBE, PERCUTANEOUS;  Surgeon: Hernando Rodriguez MD;  [facility]     right acl       TRACHEOSTOMY PERCUTANEOUS N/A 10/29/2021    Procedure: Percutaneous Tracheostomy,;  Surgeon: Celine Jenkins MD;  [facility]     TRANSPLANT LUNG RECIPIENT SINGLE X2 Bilateral 10/16/2021    Procedure: TRANSPLANT, LUNG, RECIPIENT, BILATERAL, Bronchoscopy, on-pump  perfusion, bilateral clamshell sternotomy;  Surgeon: Yanick Corral MD;  [facility]     XR ACROMIOCLAVICULAR JOINT BILATERAL         SOCIAL HISTORY  Marital Status:    Living situation: Lives with spouse in multilevel home in [place]. 2 steps to enter, 12 within. Bedroom on upper level, bathroom on main and upper level  Family support: spouse does not work and would be able to support as needed,   Vocational History:   Tobacco use: None  Alcohol use: None  Illicit drug use: None      FAMILY HISTORY  Family History   Problem Relation Age of Onset     Diabetes Type 1 Mother      Heart Disease Mother      Chronic Obstructive Pulmonary Disease Mother      Rheumatoid Arthritis Father      Emphysema Paternal Grandfather          PRIOR FUNCTIONAL HISTORY   Pt was previously independent with all ADLs/IADLs, transfers, mobility and gait. Just prior to admission had been dependent on spouse for assistance with ADL/iADLs due to shortness of breath, particularly dressing and showering. He was using O2 at baseline.     MEDICATIONS  Medications Prior to Admission   Medication Sig Dispense Refill Last Dose     amLODIPine (NORVASC) 5 MG tablet Take 5 mg by mouth daily        fluticasone-vilanterol (BREO ELLIPTA) 200-25 MCG/INH inhaler Inhale 1 puff into the lungs daily        [DISCONTINUED] acetaminophen (TYLENOL) 325 MG tablet Take 325 mg by mouth every 6 hours as needed for mild pain        [DISCONTINUED] escitalopram (LEXAPRO) 5 MG tablet Take 5 mg by mouth daily        [DISCONTINUED] insulin aspart (NOVOLOG FLEXPEN) 100 UNIT/ML pen Inject 3-11 Units Subcutaneous With meals and at bedtime.        [DISCONTINUED] levothyroxine (SYNTHROID/LEVOTHROID) 25 MCG tablet Take 25 mcg by mouth daily        [DISCONTINUED] omeprazole (PRILOSEC) 40 MG DR capsule Take 40 mg by mouth 2 times daily (before meals)        [DISCONTINUED] potassium chloride ER (KLOR-CON M) 20 MEQ CR tablet Take 20 mEq by mouth daily         "[DISCONTINUED] sulfamethoxazole-trimethoprim (BACTRIM DS) 800-160 MG tablet Take 1 tablet by mouth Every Mon, Wed, Fri Morning           ALLERGIES   No Known Allergies      REVIEW OF SYSTEMS  A 10 point ROS was performed and negative unless otherwise noted in HPI.     PHYSICAL EXAM  VITAL SIGNS:  There were no vitals taken for this visit.  BMI:  Estimated body mass index is 25.44 kg/m  as calculated from the following:    Height as of 11/26/21: 1.626 m (5' 4.02\").    Weight as of an earlier encounter on 12/13/21: 67.3 kg (148 lb 4.8 oz).     General: NAD, pleasant and cooperative   HEENT: ATNC, oropharynx clear with MMM, EOMI, PERRL. Healing HSV on L chin/jaw  Pulmonary: clear breath sounds b/l, no rales/wheezing . Slightly diminished at R lung base   Cardiovascular: RRR, no murmur   Abdominal: soft, non-tender, non-distended. PEG site c/d/i  Extremities: warm, well perfused, no edema in bilateral lower extremities, no tenderness in calves   MSK/neuro:   Mental Status:  alert and oriented x4   Cranial Nerves:  1. 2nd CN: Pupils equal, round, reactive to light and accomodation. and visual fields deficits in R inferior central (new), L peripheral (old from when young).   2. 3rd,4th,6th CN:  EOMI, appropriate pupillary responses  3. 5th CN: facial sensation intact   4. 7th CN: face symmetrical   5. 8th CN: functional hearing bilaterally  6. 9th, 10th CN: palate elevates symmetrically   7. 11th CN: sternocleidomastoids and trapezii strong   8. 12th CN: tongue midline and without fasciculations     Sensory: Normal to light touch in bilateral upper and lower extremities    Strength: give way strength throughout associated with tremor   SF  EF  EE  WE  G  I  HF  KE  DF  EHL  PF   R  4+ 4- 4+ 4- 4- 4- 4- 4+ 4+ 4- 4+  L  4+  4+ 4+ 4+ 4+ 4+ 4+ 4+ 4+ 4+ 4+   Reflexes: 3+ brisk, no clonus   Abad's test: negative bilaterally    Tone per modified Cole Scale: normal   Abnormal movements: tremor    Coordination: no " dysmetria   Speech: fluent, no dysarthria   Cognition: intact to superficial conversation   Gait: not assessed  Skin: Thoracotomy sites healing well, no drainage or erythema      LABS  Pertinent labs reviewed    Lab Results   Component Value Date    WBC 9.7 12/12/2021    HGB 9.8 (L) 12/12/2021    HCT 30.6 (L) 12/12/2021    MCV 93 12/12/2021     (L) 12/12/2021     Lab Results   Component Value Date     12/12/2021    POTASSIUM 4.5 12/12/2021    CHLORIDE 108 12/12/2021    CO2 26 12/12/2021     (H) 12/13/2021     Lab Results   Component Value Date    GFRESTIMATED 84 12/12/2021     Lab Results   Component Value Date    AST 17 12/12/2021    ALT 26 12/12/2021    ALKPHOS 104 12/12/2021    BILITOTAL 0.2 12/12/2021    DOREEN 25 11/03/2021     Lab Results   Component Value Date    INR 2.57 (H) 12/13/2021     Lab Results   Component Value Date    BUN 34 (H) 12/12/2021    CR 1.00 12/12/2021         ASSESSMENT/PLAN:  Edson Thornton is a 56 year old right hand dominant male with a hx of NSIP/ILD associated with rheumatoid lung disease, hypertension, hyperlipidemia, RA who initially presented 9/5/2021 with acute on chronic respiratory failure for transplant work-up s/p bilateral lung transplant (10/16/21) with prolonged hospitalization associated with multifocal bilateral acute/subacute ischemic infarcts of bilateral frontal, occipital lobes.  Postoperative course complicated by prolonged ventilator wean s/p trach and PEG placement (10/29), encephalopathy, new Afib with RVR, BRENNAN, candidemia/Candida empyema, and brief bradycardic arrest with subsequent hypotension in the setting of GI bleed.  [facility] and was decannulated 12/8/2021.  He is currently stable on room air at admission to acute rehab.  He presents with right upper extremity weakness, bilateral dysmetria/tremors, acute on chronic severe deconditioning, dyspnea on exertion, and impairment of ADLs and gait.        Admission to acute inpatient rehab 12/13/2021  .    Impairment group code: Stroke Ischemic 01.3 Bilateral Involvement: embolic CVA affecting B cerebral hemispheres and L cerebellum, as well as s/p bilateral lung transplantation      1. PT, OT 90 minutes of each on a daily basis, in addition to rehab nursing and close management of physiatrist.   2. Impairment of ADL's/idals:  OT for 90 min daily to work on upper and lower body self care, dressing, toileting, bathing, energy conservation techniques with use of ADs as needed.   3. Impairment of mobility:   PT for 90 min daily to work on gait exercises, strengthening, endurance buildup, transfers with use of walker as needed.   4. Impairment of cognition:  Patient is high risk for impairment given his hx of stroke, prolonged hospitalization and encephalopathy. OT for full cognitive screen, will consider formal SLP pending results of screen.   5. Rehab RN to administer medication, patient education on medication taking, VS monitoring, bowel regimen, glucose monitoring and wound care/surgical wound dressing changes and monitoring.   6. Medical Conditions - Hospital ist team consulted, appreciate assistance    Neuro/Psych  #Multifocal acute to subacute ischemic stroke, etiology likely embolic vs perioperative  Initially noted 10/22 and 11/6 with stroke code called for change in exam.  MRI brain 10/23 with infarcts noted in both cerebral hemispheres (R frontal, L corona radiata, bilateral occipital), left cerebellum, presumed associated with new Afib vs perioperative. Bilateral upper extremity paresis (R>L), suspicion for some cortical blindness  - Stroke risk factor management:              -Warfarin anticoagulation for Afib, pharmacy to dose              -BP goals: SBP<140              - Rosuvastatin 10 mg qday              -Not smoking              -Hgb A1c: 6.6    #Pain  -Acetaminophen 975mg q8h  -Lidocaine patch  -Menthol  patch    #Encephalopathy  #Sleep  -Delirium precautions  -Seroquel 25mg twice daily, 50 mg at bedtime, 25mg 3 times daily as needed; wean as able  -Melatonin 10 nightly     #Mood  #History of anxiety and depression  - PTA Lexapro increased to 10 mg while in acute hospital  -Could consider increase to 20 mg around 12/12  -Hydroxyzine as needed         Pulmonary  #ILD, NISP, s/p bilateral lung transplant (10/16/2021)  #Acute on chronic respiratory failure s/p tracheostomy (10/29/21) and decannulation (12/8/21)  #Bilateral Pleural Effusions  -Sternal precautions until 12 wks post op (1/8/2022)  -Immunosuppression:   -s/p basiliximab on 10/16, 10/20   -Continue tacrolimus 2.5mg qam and 2mg qhs, goal 8-12   -Continue MMF 1000mg BID   -Continue prednisone 10mg BID  -Monitoring lab/imaging   -DSA q2wks   -Monthly Coccidioides Ag due 12/17   -CMV, EBV, IgG on 12/14    -CXR, CBC, BMP, Tac level M/Th  -Continue Levalbuterol and mucomyst nebs QID  -Aggressive pulmonary toilet and chest PT QID  -Continue Lasix 40mg qday    #SALTY  -PTA CPAP     ID  #Immunosuppression Prophylaxsis   -Bactrim x6 months  -Nystatin x6 months    #L lung cavitary lesion  #Hospital-acquired pneumonia with Klebsiella pneumoniae, Pseudomonas fluorescens/putida  Suspected aspiration versus pseudomonal vs Klebsiella pneumonia related to abscess.  CT on 1025 with left lower lobe nodular opacity with repeat on 11/22 showing new cavitary lesion in left lung base, multifocal bilateral upper lobe groundglass opacity, and new 1.8 cm fluid collection with surrounding fat stranding in left axilla, decreased bilateral loculated pleural effusions.  QuantiFERON gold indeterminate, negative tuberculin skin test on multiple occurrences.  Underwent BAL on 11/22 with cultures growing Klebsiella and Pseudomonas fluorescens/putida, resistant to meropenem.  ID was consulted and feels this is likely fungal.  B-D glucan positive with known candidemia.  Cocci antigen in urine  negative, serum histoplasmosis negative, CRAG negative  -IV ceftazidime 2g q8hrs (end 12/21)  -Follow-up chest CT 12/21 and follow-up with Dr. Abebe on 12/21/2021 (will likely need to reschedule)    #Invasive Candida albicans candidiasis  Positive Candida blood cultures 10/20, 10/22.  Fungitell positive (399).  TC 10/23 without evidence of endocarditis. Ophthalmic consulted 10/24, benign funduscopic exam.  Candida empyema 10/25.  Repeated pleural effusion fungal cultures and fungal/bacterial blood cultures have been without growth since 11/18.  Initially on micafungin (10/22-10/27) before transition to fluconazole IV  -Continue IV Fluconazole 400mg daily (10/26-12/21)    #hx of HSV  Chronic intermittent infection pretransplant, with recent HSV infection while in acute hospital (crusted lesions along left jaw) s/p 10d treatment until 10/9.     Cardiovascular  #Afib  Initially noted 10/18, converted to NSR with amio ggt. Metoprolol and amiodarone discontinued due to bradycardia.  -Warfarin as dosed by pharmacy    #Hypertension  -PTA amlodipine 5 mg daily    Heme  #R subclavian DVT  -Warfarin anticoagulation, pharmacy to dose    #Hypogammaglobulinemia  S/p IVIG  -Repeat IgG 12/15    Endocrine  #Type 2 diabetes  #Steroid-induced hyperglycemia  -Endocrine consulted while in acute hospital, appreciate recs   -Continue Lantus 30 mg BID   -High intensity sliding scale insulin q4h while on TF   - FSBG checks q4h    -Transition  BG checks and sliding scale insulin to QID once regularly eating meals    #Hypothyroidism  TSH 3.17 on 11/19  -Continue levothyroxine 25 mcg daily    Renal  #BRENNAN, resolved  Cr 1.0 12/12, slightly increased from prior.   -Monitor intermittently    MSK/Rheum  #Rheumatoid arthritis  Previously treated with rituximab.  Rheumatology familiar and consulted early in admission.  -Steroids as above    GI/FEN  #Severe malnutrition  Patient currently has PEG in place, receiving majority of nutrition through  cycled tube feeds  -Continue multivitamin, folate, B6, B12 supplements  -TF per dietician, appreciate assistance.  Currently cycled  -Encourage p.o. intake     #hx of GI Bleed  Patient with progressive hypotension and CODE BLUE for bradycardia/asystole associated with GI bleed in the setting of anticoagulation, requiring massive transfusion protocol on 10/22.  EGD on 11/2 with clotted blood in stomach, adherent clot near PEG site that was clipped without active bleeding.  Most recent EGD 11/5 with ulcer noted near the PEG bumper site, gastritis, suction marks from the G-tube.  GJ tube was exchanged 11/9 by GI. Hgb 9.8 12/12  -Monitor hemoglobin intermittently.  -Continue PPI BID      Bowel/Bladder  Bowel, continent  Constipation  - Senna-docusate 1-2 tablets BID  - Miralax QDAY PRN  - Bisacodyl suppository 10mg QDAY PRN     Bladder, continent    1. Adjustment to disability:  Clinical psychology to eval and treat as indicated  2. FEN: Regular/easy chew 7 with thin liquids, cycled TF  3. Bowel: continent, meds as above  4. Bladder: continent  5. DVT Prophylaxis: warfarin  6. GI Prophylaxis: PPI  7. Code: full  8. Disposition: Local housing - [facility]  9. ELOS:  2-3 weeks  10. Rehab prognosis:  good  9. Follow up Appointments on Discharge:    -Neurology 1-2 months after discharge   -Outpatient cardiac/pulmonary Rehab   -Transplant Coordinator   -ID: Follow up with Dr. Abebe on 12/21/2021 (likely need to reschedule)    Patient was seen and discussed with attending physician, Dr. Cliff Austin MD  PGY-2  Physical Medicine & Rehabilitation

## 2021-12-14 ENCOUNTER — APPOINTMENT (OUTPATIENT)
Dept: OCCUPATIONAL THERAPY | Facility: CLINIC | Age: 56
End: 2021-12-14
Attending: PHYSICAL MEDICINE & REHABILITATION
Payer: COMMERCIAL

## 2021-12-14 ENCOUNTER — APPOINTMENT (OUTPATIENT)
Dept: PHYSICAL THERAPY | Facility: CLINIC | Age: 56
End: 2021-12-14
Attending: PHYSICAL MEDICINE & REHABILITATION
Payer: COMMERCIAL

## 2021-12-14 LAB
ANION GAP SERPL CALCULATED.3IONS-SCNC: 9 MMOL/L (ref 3–14)
BASOPHILS # BLD AUTO: 0.1 10E3/UL (ref 0–0.2)
BASOPHILS NFR BLD AUTO: 1 %
BUN SERPL-MCNC: 33 MG/DL (ref 7–30)
CALCIUM SERPL-MCNC: 9.9 MG/DL (ref 8.5–10.1)
CHLORIDE BLD-SCNC: 101 MMOL/L (ref 94–109)
CO2 SERPL-SCNC: 25 MMOL/L (ref 20–32)
CREAT SERPL-MCNC: 0.82 MG/DL (ref 0.66–1.25)
DEPRECATED CALCIDIOL+CALCIFEROL SERPL-MC: 44 UG/L (ref 20–75)
EOSINOPHIL # BLD AUTO: 0 10E3/UL (ref 0–0.7)
EOSINOPHIL NFR BLD AUTO: 0 %
ERYTHROCYTE [DISTWIDTH] IN BLOOD BY AUTOMATED COUNT: 13.8 % (ref 10–15)
GFR SERPL CREATININE-BSD FRML MDRD: >90 ML/MIN/1.73M2
GLUCOSE BLD-MCNC: 115 MG/DL (ref 70–99)
GLUCOSE BLDC GLUCOMTR-MCNC: 120 MG/DL (ref 70–99)
GLUCOSE BLDC GLUCOMTR-MCNC: 134 MG/DL (ref 70–99)
GLUCOSE BLDC GLUCOMTR-MCNC: 139 MG/DL (ref 70–99)
GLUCOSE BLDC GLUCOMTR-MCNC: 139 MG/DL (ref 70–99)
GLUCOSE BLDC GLUCOMTR-MCNC: 168 MG/DL (ref 70–99)
GLUCOSE BLDC GLUCOMTR-MCNC: 206 MG/DL (ref 70–99)
GLUCOSE BLDC GLUCOMTR-MCNC: 82 MG/DL (ref 70–99)
HCT VFR BLD AUTO: 36.2 % (ref 40–53)
HGB BLD-MCNC: 11.5 G/DL (ref 13.3–17.7)
IMM GRANULOCYTES # BLD: 0.4 10E3/UL
IMM GRANULOCYTES NFR BLD: 3 %
INR PPP: 2.25 (ref 0.86–1.14)
LYMPHOCYTES # BLD AUTO: 0.6 10E3/UL (ref 0.8–5.3)
LYMPHOCYTES NFR BLD AUTO: 4 %
MCH RBC QN AUTO: 28.7 PG (ref 26.5–33)
MCHC RBC AUTO-ENTMCNC: 31.8 G/DL (ref 31.5–36.5)
MCV RBC AUTO: 90 FL (ref 78–100)
MONOCYTES # BLD AUTO: 0.6 10E3/UL (ref 0–1.3)
MONOCYTES NFR BLD AUTO: 5 %
NEUTROPHILS # BLD AUTO: 11.6 10E3/UL (ref 1.6–8.3)
NEUTROPHILS NFR BLD AUTO: 87 %
NRBC # BLD AUTO: 0 10E3/UL
NRBC BLD AUTO-RTO: 0 /100
PLATELET # BLD AUTO: 218 10E3/UL (ref 150–450)
POTASSIUM BLD-SCNC: 5 MMOL/L (ref 3.4–5.3)
RBC # BLD AUTO: 4.01 10E6/UL (ref 4.4–5.9)
SODIUM SERPL-SCNC: 135 MMOL/L (ref 133–144)
VITAMIN D2 SERPL-MCNC: 14 UG/L
VITAMIN D3 SERPL-MCNC: 30 UG/L
WBC # BLD AUTO: 13.2 10E3/UL (ref 4–11)

## 2021-12-14 PROCEDURE — 97167 OT EVAL HIGH COMPLEX 60 MIN: CPT | Mod: GO | Performed by: STUDENT IN AN ORGANIZED HEALTH CARE EDUCATION/TRAINING PROGRAM

## 2021-12-14 PROCEDURE — 36415 COLL VENOUS BLD VENIPUNCTURE: CPT | Performed by: PHYSICAL MEDICINE & REHABILITATION

## 2021-12-14 PROCEDURE — 250N000012 HC RX MED GY IP 250 OP 636 PS 637: Performed by: NURSE PRACTITIONER

## 2021-12-14 PROCEDURE — 97530 THERAPEUTIC ACTIVITIES: CPT | Mod: GP

## 2021-12-14 PROCEDURE — 128N000003 HC R&B REHAB

## 2021-12-14 PROCEDURE — 97535 SELF CARE MNGMENT TRAINING: CPT | Mod: GO | Performed by: STUDENT IN AN ORGANIZED HEALTH CARE EDUCATION/TRAINING PROGRAM

## 2021-12-14 PROCEDURE — 250N000013 HC RX MED GY IP 250 OP 250 PS 637

## 2021-12-14 PROCEDURE — 250N000011 HC RX IP 250 OP 636: Performed by: PHYSICAL MEDICINE & REHABILITATION

## 2021-12-14 PROCEDURE — 250N000009 HC RX 250: Performed by: PHYSICAL MEDICINE & REHABILITATION

## 2021-12-14 PROCEDURE — 99207 PR NO BILLABLE SERVICE THIS VISIT: CPT | Performed by: NURSE PRACTITIONER

## 2021-12-14 PROCEDURE — 250N000009 HC RX 250: Performed by: INTERNAL MEDICINE

## 2021-12-14 PROCEDURE — 272N000083 HC NUTRITION PRODUCT SEMIELEM INTERMED LITER

## 2021-12-14 PROCEDURE — 94640 AIRWAY INHALATION TREATMENT: CPT

## 2021-12-14 PROCEDURE — 999N000157 HC STATISTIC RCP TIME EA 10 MIN

## 2021-12-14 PROCEDURE — 250N000012 HC RX MED GY IP 250 OP 636 PS 637: Performed by: INTERNAL MEDICINE

## 2021-12-14 PROCEDURE — 85610 PROTHROMBIN TIME: CPT | Performed by: INTERNAL MEDICINE

## 2021-12-14 PROCEDURE — 82310 ASSAY OF CALCIUM: CPT | Performed by: PHYSICAL MEDICINE & REHABILITATION

## 2021-12-14 PROCEDURE — 97163 PT EVAL HIGH COMPLEX 45 MIN: CPT | Mod: GP

## 2021-12-14 PROCEDURE — 250N000013 HC RX MED GY IP 250 OP 250 PS 637: Performed by: PHYSICAL MEDICINE & REHABILITATION

## 2021-12-14 PROCEDURE — 250N000011 HC RX IP 250 OP 636: Performed by: INTERNAL MEDICINE

## 2021-12-14 PROCEDURE — 85004 AUTOMATED DIFF WBC COUNT: CPT | Performed by: PHYSICAL MEDICINE & REHABILITATION

## 2021-12-14 PROCEDURE — 99232 SBSQ HOSP IP/OBS MODERATE 35: CPT | Performed by: INTERNAL MEDICINE

## 2021-12-14 PROCEDURE — 94640 AIRWAY INHALATION TREATMENT: CPT | Mod: 76

## 2021-12-14 PROCEDURE — 99232 SBSQ HOSP IP/OBS MODERATE 35: CPT | Mod: 24 | Performed by: PHYSICAL MEDICINE & REHABILITATION

## 2021-12-14 PROCEDURE — 250N000013 HC RX MED GY IP 250 OP 250 PS 637: Performed by: INTERNAL MEDICINE

## 2021-12-14 RX ORDER — SODIUM CHLORIDE 9 MG/ML
INJECTION, SOLUTION INTRAVENOUS
Status: DISCONTINUED
Start: 2021-12-14 | End: 2021-12-15 | Stop reason: HOSPADM

## 2021-12-14 RX ORDER — ESCITALOPRAM OXALATE 20 MG/1
20 TABLET ORAL DAILY
Status: DISCONTINUED | OUTPATIENT
Start: 2021-12-15 | End: 2021-12-30 | Stop reason: HOSPADM

## 2021-12-14 RX ORDER — WARFARIN SODIUM 1 MG/1
2 TABLET ORAL
Status: COMPLETED | OUTPATIENT
Start: 2021-12-14 | End: 2021-12-14

## 2021-12-14 RX ADMIN — Medication 1 PACKET: at 10:00

## 2021-12-14 RX ADMIN — ACETYLCYSTEINE 2 ML: 200 SOLUTION ORAL; RESPIRATORY (INHALATION) at 19:59

## 2021-12-14 RX ADMIN — CEFTAZIDIME 2 G: 2 INJECTION, POWDER, FOR SOLUTION INTRAVENOUS at 12:47

## 2021-12-14 RX ADMIN — INSULIN ASPART 3 UNITS: 100 INJECTION, SOLUTION INTRAVENOUS; SUBCUTANEOUS at 00:21

## 2021-12-14 RX ADMIN — CEFTAZIDIME 2 G: 2 INJECTION, POWDER, FOR SOLUTION INTRAVENOUS at 04:51

## 2021-12-14 RX ADMIN — CALCIUM CARBONATE 600 MG (1,500 MG)-VITAMIN D3 400 UNIT TABLET 1 TABLET: at 18:17

## 2021-12-14 RX ADMIN — LEVOTHYROXINE SODIUM 25 MCG: 25 TABLET ORAL at 08:09

## 2021-12-14 RX ADMIN — Medication 1 TABLET: at 08:13

## 2021-12-14 RX ADMIN — LEVALBUTEROL HYDROCHLORIDE 1.25 MG: 1.25 SOLUTION RESPIRATORY (INHALATION) at 07:21

## 2021-12-14 RX ADMIN — MULTIPLE VITAMINS W/ MINERALS TAB 1 TABLET: TAB at 08:09

## 2021-12-14 RX ADMIN — QUETIAPINE FUMARATE 50 MG: 50 TABLET ORAL at 21:04

## 2021-12-14 RX ADMIN — INSULIN GLARGINE 30 UNITS: 100 INJECTION, SOLUTION SUBCUTANEOUS at 08:12

## 2021-12-14 RX ADMIN — ALBUTEROL SULFATE 2.5 MG: 2.5 SOLUTION RESPIRATORY (INHALATION) at 19:59

## 2021-12-14 RX ADMIN — ROSUVASTATIN CALCIUM 10 MG: 10 TABLET, FILM COATED ORAL at 08:09

## 2021-12-14 RX ADMIN — ESCITALOPRAM OXALATE 10 MG: 10 TABLET ORAL at 08:09

## 2021-12-14 RX ADMIN — FLUTICASONE PROPIONATE 1 SPRAY: 50 SPRAY, METERED NASAL at 08:12

## 2021-12-14 RX ADMIN — ACETYLCYSTEINE 2 ML: 200 SOLUTION ORAL; RESPIRATORY (INHALATION) at 07:21

## 2021-12-14 RX ADMIN — WARFARIN SODIUM 2 MG: 1 TABLET ORAL at 18:17

## 2021-12-14 RX ADMIN — NYSTATIN 1000000 UNITS: 100000 SUSPENSION ORAL at 12:47

## 2021-12-14 RX ADMIN — NYSTATIN 1000000 UNITS: 100000 SUSPENSION ORAL at 16:43

## 2021-12-14 RX ADMIN — CALCIUM CARBONATE 600 MG (1,500 MG)-VITAMIN D3 400 UNIT TABLET 1 TABLET: at 08:09

## 2021-12-14 RX ADMIN — INSULIN HUMAN 30 UNITS: 100 INJECTION, SUSPENSION SUBCUTANEOUS at 18:16

## 2021-12-14 RX ADMIN — INSULIN ASPART 2 UNITS: 100 INJECTION, SOLUTION INTRAVENOUS; SUBCUTANEOUS at 04:50

## 2021-12-14 RX ADMIN — Medication 400 MG: at 18:17

## 2021-12-14 RX ADMIN — PREDNISONE 10 MG: 10 TABLET ORAL at 20:45

## 2021-12-14 RX ADMIN — PREDNISONE 10 MG: 10 TABLET ORAL at 08:09

## 2021-12-14 RX ADMIN — ACETAMINOPHEN 975 MG: 325 TABLET, FILM COATED ORAL at 12:47

## 2021-12-14 RX ADMIN — MYCOPHENOLATE MOFETIL 1000 MG: 250 CAPSULE ORAL at 20:45

## 2021-12-14 RX ADMIN — SULFAMETHOXAZOLE AND TRIMETHOPRIM 1 TABLET: 400; 80 TABLET ORAL at 08:09

## 2021-12-14 RX ADMIN — ACETYLCYSTEINE 2 ML: 200 SOLUTION ORAL; RESPIRATORY (INHALATION) at 12:16

## 2021-12-14 RX ADMIN — Medication 1 PACKET: at 20:46

## 2021-12-14 RX ADMIN — TACROLIMUS 2 MG: 1 CAPSULE ORAL at 18:17

## 2021-12-14 RX ADMIN — PANTOPRAZOLE SODIUM 40 MG: 40 TABLET, DELAYED RELEASE ORAL at 16:43

## 2021-12-14 RX ADMIN — PANTOPRAZOLE SODIUM 40 MG: 40 TABLET, DELAYED RELEASE ORAL at 08:09

## 2021-12-14 RX ADMIN — FUROSEMIDE 40 MG: 40 TABLET ORAL at 08:09

## 2021-12-14 RX ADMIN — Medication 800 MG: at 08:09

## 2021-12-14 RX ADMIN — NYSTATIN 1000000 UNITS: 100000 SUSPENSION ORAL at 08:08

## 2021-12-14 RX ADMIN — CEFTAZIDIME 2 G: 2 INJECTION, POWDER, FOR SOLUTION INTRAVENOUS at 19:56

## 2021-12-14 RX ADMIN — TACROLIMUS 2.5 MG: 1 CAPSULE ORAL at 08:09

## 2021-12-14 RX ADMIN — NYSTATIN 1000000 UNITS: 100000 SUSPENSION ORAL at 19:53

## 2021-12-14 RX ADMIN — FLUCONAZOLE, SODIUM CHLORIDE 400 MG: 2 INJECTION INTRAVENOUS at 10:00

## 2021-12-14 RX ADMIN — LEVALBUTEROL HYDROCHLORIDE 1.25 MG: 1.25 SOLUTION RESPIRATORY (INHALATION) at 12:16

## 2021-12-14 RX ADMIN — MYCOPHENOLATE MOFETIL 1000 MG: 250 CAPSULE ORAL at 08:08

## 2021-12-14 RX ADMIN — AMLODIPINE BESYLATE 5 MG: 5 TABLET ORAL at 08:09

## 2021-12-14 ASSESSMENT — ACTIVITIES OF DAILY LIVING (ADL)
ADLS_ACUITY_SCORE: 27
ADLS_ACUITY_SCORE: 25
ADLS_ACUITY_SCORE: 27
ADLS_ACUITY_SCORE: 27
ADLS_ACUITY_SCORE: 25
ADLS_ACUITY_SCORE: 27
ADLS_ACUITY_SCORE: 25
ADLS_ACUITY_SCORE: 27
ADLS_ACUITY_SCORE: 25
ADLS_ACUITY_SCORE: 25
ADLS_ACUITY_SCORE: 27
ADLS_ACUITY_SCORE: 27

## 2021-12-14 NOTE — PROGRESS NOTES
"   12/14/21 0642   Quick Adds   Type of Visit Initial Occupational Therapy Evaluation   Living Environment   People in home spouse   Current Living Arrangements house   Transportation Anticipated family or friend will provide   Living Environment Comments 2 PILO and 12 stairs to bedroom, walk in shower downstairs and a shower chair. standard height toilet   Self-Care   Usual Activity Tolerance fair   Current Activity Tolerance poor   Equipment Currently Used at Home shower chair;walker, rolling   Activity/Exercise/Self-Care Comment Ptused O2 prior to admission, wife did IADL and started assisting with ADL. Pt was a .    Disability/Function   Hearing Difficulty or Deaf no   Concentrating, Remembering or Making Decisions Difficulty no   Difficulty Communicating no   Difficulty Eating/Swallowing no   Walking or Climbing Stairs Difficulty yes   Dressing/Bathing Difficulty yes   Toileting issues yes   Doing Errands Independently Difficulty (such as shopping) yes   Fall history within last six months no   Change in Functional Status Since Onset of Current Illness/Injury yes   General Information   Onset of Illness/Injury or Date of Surgery 12/13/21   Referring Physician Dr Lan Coronado   Patient/Family Therapy Goal Statement (OT) to take care of himself do more than 1-2 minutes of stuff.    Additional Occupational Profile Info/Pertinent History of Current Problem Per chart \"medical history of RA, interstitial lung disease, sleep apnea, hypertension, hyperlipidemia, hypogammaglobulinemia, anxiety, and depression who was transferred to [facility] from an outside hospital on 9/5/2021 with worsening respiratory failure and evaluation of lung transplant.  He did ultimately require bilateral lung transplants on 10/16/2021.  His hospitalization has been very prolonged and complicated, including bilateral multifocal embolic strokes, difficulty weaning status post trach (now decannulated) and PEG placement, recurrent GI bleed " "with progressive hypotension and CODE BLUE call requiring massive transfusion protocol with ulcer noted at the PEG bumper site, encephalopathy, atrial fibrillation with RVR, BRENNAN, Candida empyema and candidemia currently on IV fluconazole, HCAP on IV ceftazidime, hypogammaglobulin anemia status post IVIG, and right subclavian DVT.\"   Existing Precautions/Restrictions fall;sternal;immunosuppressed  (enteral tube feeds, free water)   Left Upper Extremity (Weight-bearing Status)   (10# weigth limit)   Right Upper Extremity (Weight-bearing Status)   (10# weigth limit)   Cognitive Status Examination   Orientation Status orientation to person, place and time   Cognitive Status Comments SLP to be consulted 24/30 on the SLUMS   Visual Perception   Impact of Vision Impairment on Function (Vision) L field cut at baseline. new blind spot on the right side. Pt reports it is impacting his depth perception   Sensory   Sensory Comments tinging in feet and numbness in hands. Pt could feel light touch in BUE and stereognosis is intact.    Pain Assessment   Patient Currently in Pain Yes, see Vital Sign flowsheet  (pain by the chest tubne entrance)   Range of Motion Comprehensive   Comment, General Range of Motion BUE WFL   Strength Comprehensive (MMT)   Comment, General Manual Muscle Testing (MMT) Assessment not formally assessed due to sternal precautions, overall deconditioning, BUE 4/5   Coordination   Coordination Comments tremors in BUE impacting ADL   ARC Assessment Only   Acute Rehab Functional Assessment See IP Rehab Daily Documentation Flowsheet for Functional Mobility/ADL Assessment   Clinical Impression   Criteria for Skilled Therapeutic Interventions Met (OT) yes   OT Diagnosis ADL and mobility deficit   OT Problem List-Impairments impacting ADL problems related to;balance;cognition;coordination;strength;sensation;pain;post-surgical precautions;vision   ADL comments/analysis ADL and mobility deficit   Assessment of " Occupational Performance 5 or more Performance Deficits   Identified Performance Deficits ADL and mobility deficit   Planned Therapy Interventions (OT) ADL retraining;IADL retraining;balance training;cognition;fine motor coordination training;transfer training;strengthening;progressive activity/exercise;visual perception   Clinical Decision Making Complexity (OT) high complexity   Therapy Frequency (OT) Daily   Predicted Duration of Therapy 2-3 weeks   Anticipated Equipment Needs Upon Discharge (OT)   (TBD)   Risk & Benefits of therapy have been explained evaluation/treatment results reviewed;care plan/treatment goals reviewed   Comment-Clinical Impression Pt is below basdeline due to poor activity tolerance, strength, cognition, coordination, visualperceptual deficits and will benefit from skilled OT to improve his function and independence   Total Evaluation Time (Minutes)   Total Evaluation Time (Minutes) 10

## 2021-12-14 NOTE — PLAN OF CARE
FOCUS/GOAL  Bowel management, Bladder management, Medication management, Pain management, Medical management, Mobility, Skin integrity, and Safety management    ASSESSMENT, INTERVENTIONS AND CONTINUING PLAN FOR GOAL:      Pt is alert and oriented x 4. BUE tremors present. Very agitated regarding his cares. Refused lunch due to level 7 diet. Seen by resident and diet changed to regular. IV ABX running. PICC line patent, saline locked, blood return noted, dressing CDI. PEG site CDI, flushed with 240 ml water.Old trach site dressing CDI. Tube feeding scheduled from 6 pm to 6 am. Continent of bowel and bladder. Ax1 transfers. Reg diet, thin liquids. Denied pain (received scheduled tylenol), nausea and vomiting, SOB. Alarms on, call light within reach. Will continue with plan of care.

## 2021-12-14 NOTE — PROGRESS NOTES
[facility]   Acute Rehabilitation Unit  Daily progress note    INTERVAL HISTORY  No acute events overnight. Patient very upset about frequent BG checks, IV antibiotic administrations, reportedly yelling at nurses. Upon discussion this morning, he is very frustrated because, while the cares/ BG checks/meds have not changed, his cares were less clustered overnight. We discuss that we will try to better cluster his cares. Breathing at baseline. Denies chest pain abdominal pain, shortness of breath, fever chills. No further concerns.    Functionally, awaiting assessments with therapies.     MEDICATIONS  Scheduled meds    acetaminophen  975 mg Oral or Feeding Tube Q8H     acetylcysteine  2 mL Nebulization TID     amLODIPine  5 mg Oral Daily     calcium carbonate 600 mg-vitamin D 400 units  1 tablet Oral or Feeding Tube BID w/meals     cefTAZidime  2 g Intravenous Q8H     escitalopram  10 mg Oral or Feeding Tube Daily     fiber modular (NUTRISOURCE FIBER)  1 packet Per Feeding Tube BID     fluconazole  400 mg Intravenous Q24H     fluticasone  1 spray Both Nostrils Daily     folic acid-vit B6-vit B12  1 tablet Oral Daily     furosemide  40 mg Oral Daily     insulin aspart  1-12 Units Subcutaneous Q4H     insulin glargine  30 Units Subcutaneous BID     levalbuterol  1.25 mg Nebulization TID     levothyroxine  25 mcg Oral Daily     Lidocaine  2 patch Transdermal Q24H     lidocaine   Transdermal Q8H     magnesium oxide  400 mg Oral QPM     magnesium oxide  800 mg Oral QAM     menthol   Transdermal Q8H     multivitamin w/minerals  1 tablet Oral Daily     mycophenolate  1,000 mg Oral BID     nystatin  1,000,000 Units Swish & Swallow 4x Daily     pantoprazole  40 mg Oral BID AC     predniSONE  10 mg Oral or Feeding Tube BID     QUEtiapine  50 mg Oral At Bedtime     rosuvastatin  10 mg Oral or Feeding Tube Daily     sodium chloride (PF)  10-40 mL Intracatheter Q8H      "sulfamethoxazole-trimethoprim  1 tablet Oral or Feeding Tube Daily     tacrolimus  2 mg Oral QPM     tacrolimus  2.5 mg Oral QAM       PRN meds:  albuterol, bisacodyl, dextrose, glucose **OR** dextrose **OR** glucagon, hydrOXYzine, lidocaine (viscous), magnesium hydroxide, melatonin, menthol, naloxone **OR** naloxone **OR** naloxone **OR** naloxone, ondansetron, oxyCODONE, - MEDICATION INSTRUCTIONS -, prochlorperazine, QUEtiapine, senna-docusate, sodium chloride (PF), Warfarin Therapy Reminder, zinc-white petrolatum      PHYSICAL EXAM  BP (!) 151/77 (BP Location: Left arm)   Pulse 98   Temp 97.4  F (36.3  C) (Oral)   Resp 20   Ht 1.646 m (5' 4.8\")   Wt 67.9 kg (149 lb 12.8 oz)   SpO2 94%   BMI 25.08 kg/m    Gen: Awake, coooperative, frustrated appearing  HEENT: atraumatic, no discharge from nares or ears, EOM intact  Cardio: RRR, no murmur auscultated  Pulm: Non-labored breathing, CTAB with diminished sounds at R lung base  Abd: Soft, non-tender to palpation, bowel sounds present. PEG c/d/i  Ext: No edema in lower or upper extremities b/l, no calf tenderness  Neuro/MSK: alert, oriented, no new slurring of words, answers questions appropriately, follows commands  Wound: clamshell and thoracostomy sites healing well      LABS  Recent Labs   Lab 12/14/21  1142 12/14/21  1106 12/14/21  0753 12/13/21  0843 12/13/21  0539 12/12/21  0933 12/12/21  0519 12/11/21  0819 12/11/21  0649 12/10/21  0823 12/10/21  0647   WBC  --  13.2*  --   --   --   --  9.7  --   --   --  9.0   HGB  --  11.5*  --   --   --   --  9.8*  --   --   --  9.3*   MCV  --  90  --   --   --   --  93  --   --   --  93   PLT  --  218  --   --   --   --  144*  --   --   --  141*   INR  --  2.25*  --   --  2.57*  --  2.52*  --  2.24*  --  2.18*   NA  --  135  --   --   --   --  141  --   --   --  138   POTASSIUM  --  5.0  --   --   --   --  4.5  --  4.9  --  4.7   CHLORIDE  --  101  --   --   --   --  108  --   --   --  103   CO2  --  25  --   --   --  "  --  26  --   --   --  27   BUN  --  33*  --   --   --   --  34*  --   --   --  30   CR  --  0.82  --   --   --   --  1.00  --   --   --  0.69   ANIONGAP  --  9  --   --   --   --  7  --   --   --  8   ERIK  --  9.9  --   --   --   --  8.6  --   --   --  9.0   * 115* 134*   < >  --    < > 167*   < >  --    < > 203*   ALBUMIN  --   --   --   --   --   --  2.5*  --   --   --  2.5*   PROTTOTAL  --   --   --   --   --   --  5.8*  --   --   --  5.9*   BILITOTAL  --   --   --   --   --   --  0.2  --   --   --  0.5   ALKPHOS  --   --   --   --   --   --  104  --   --   --  105   ALT  --   --   --   --   --   --  26  --   --   --  24   AST  --   --   --   --   --   --  17  --   --   --  19    < > = values in this interval not displayed.     No results found for this or any previous visit (from the past 24 hour(s)).    ASSESSMENT AND PLAN    Edson Thornton is a 56 year old right hand dominant male with a hx of NSIP/ILD associated with rheumatoid lung disease, hypertension, hyperlipidemia, RA who initially presented 9/5/2021 with acute on chronic respiratory failure for transplant work-up s/p bilateral lung transplant (10/16/21) with prolonged hospitalization associated with multifocal bilateral acute/subacute ischemic infarcts of bilateral frontal, occipital lobes.  Postoperative course complicated by prolonged ventilator wean s/p trach and PEG placement (10/29), encephalopathy, new Afib with RVR, BRENNAN, candidemia/Candida empyema, and brief bradycardic arrest with subsequent hypotension in the setting of GI bleed.  [facility] and was decannulated 12/8/2021.  He is currently stable on room air at admission to acute rehab.  He presents with right upper extremity weakness, bilateral dysmetria/tremors, acute on chronic severe deconditioning, dyspnea on exertion, and impairment of ADLs and gait.     Admission to acute inpatient rehab 12/13/2021  .    Impairment group code:  Stroke Ischemic 01.3 Bilateral Involvement: embolic CVA affecting B cerebral hemispheres and L cerebellum, as well as s/p bilateral lung transplantation     1. PT, OT 90 minutes of each on a daily basis, in addition to rehab nursing and close management of physiatrist.   2. Impairment of ADLs/IADLs:  OT for 60 min daily to work on upper and lower body self care, dressing, toileting, bathing, energy conservation techniques with use of ADs as needed.   3. Impairment of mobility:   PT for 60 min daily to work on gait exercises, strengthening, endurance buildup, transfers with use of walker as needed.   4. Impairment of cognition:  SLP for 60 minutes for cognitive evaluation and treatment strategies for higher level cognitive deficits, and memory impairment.  5. Rehab RN to administer medication, patient education on medication taking, VS monitoring, bowel regimen, glucose monitoring and wound care/surgical wound dressing changes and monitoring.   6. Medical Conditions - Hospital ist team consulted, appreciate assistance     Neuro/Psych  #Multifocal acute to subacute ischemic stroke, etiology likely embolic vs perioperative  Initially noted 10/22 and 11/6 with stroke code called for change in exam.  MRI brain 10/23 with infarcts noted in both cerebral hemispheres (R frontal, L corona radiata, bilateral occipital), left cerebellum, presumed associated with new Afib vs perioperative. Bilateral upper extremity paresis (R>L), suspicion for some cortical blindness  - Stroke risk factor management:              -Warfarin anticoagulation for Afib, pharmacy to dose              -BP goals: SBP<140              - Rosuvastatin 10 mg qday              -Not smoking              -Hgb A1c: 6.6     #Pain  -Acetaminophen 975mg q8h  -Lidocaine patch  -Menthol patch     #Encephalopathy  #Sleep  -Delirium precautions  -Seroquel 50 mg at bedtime, 25mg TID prn; scheduled 25mg discontinued 12/14, continue to wean as able  -Melatonin 10  nightly     #Mood  #History of anxiety and depression  - PTA Lexapro increased to 10 mg while in acute hospital, increased to 20mg 12/14  -Hydroxyzine as needed         Pulmonary  #ILD, NISP, s/p bilateral lung transplant (10/16/2021)  #Acute on chronic respiratory failure s/p tracheostomy (10/29/21) and decannulation (12/8/21)  #Bilateral Pleural Effusions  -Sternal precautions until 12 wks post op (1/8/2022)  -Immunosuppression:              -s/p basiliximab on 10/16, 10/20              -Continue tacrolimus 2.5mg qam and 2mg qhs, goal 8-12              -Continue MMF 1000mg BID              -Continue prednisone 10mg BID  -Monitoring lab/imaging              -DSA q2wks              -Monthly Coccidioides Ag due 12/17              -CMV, EBV, IgG on 12/14               -CXR, CBC, BMP, Tac level M/Th  -Continue Levalbuterol and mucomyst nebs QID  -Aggressive pulmonary toilet and chest PT QID  -Continue Lasix 40mg qday     #SALTY  -PTA CPAP     ID  #Immunosuppression Prophylaxsis   -Bactrim x6 months  -Nystatin x6 months     #L lung cavitary lesion  #Hospital-acquired pneumonia with Klebsiella pneumoniae, Pseudomonas fluorescens/putida  Suspected aspiration versus pseudomonal vs Klebsiella pneumonia related to abscess.  CT on 1025 with left lower lobe nodular opacity with repeat on 11/22 showing new cavitary lesion in left lung base, multifocal bilateral upper lobe groundglass opacity, and new 1.8 cm fluid collection with surrounding fat stranding in left axilla, decreased bilateral loculated pleural effusions.  QuantiFERON gold indeterminate, negative tuberculin skin test on multiple occurrences.  Underwent BAL on 11/22 with cultures growing Klebsiella and Pseudomonas fluorescens/putida, resistant to meropenem.  ID was consulted and feels this is likely fungal.  B-D glucan positive with known candidemia.  Cocci antigen in urine negative, serum histoplasmosis negative, CRAG negative  -IV ceftazidime 2g q8hrs (end  12/21)  -Follow-up chest CT 12/21 and follow-up with Dr. Abebe on 12/21/2021 (will likely need to reschedule)     #Invasive Candida albicans candidiasis  Positive Candida blood cultures 10/20, 10/22.  Fungitell positive (399).  TC 10/23 without evidence of endocarditis. Ophthalmic consulted 10/24, benign funduscopic exam.  Candida empyema 10/25.  Repeated pleural effusion fungal cultures and fungal/bacterial blood cultures have been without growth since 11/18.  Initially on micafungin (10/22-10/27) before transition to fluconazole IV  -Continue IV Fluconazole 400mg daily (10/26-12/21)     #hx of HSV  Chronic intermittent infection pretransplant, with recent HSV infection while in acute hospital (crusted lesions along left jaw) s/p 10d treatment until 10/9.     Cardiovascular  #Afib  Initially noted 10/18, converted to NSR with amio ggt. Metoprolol and amiodarone discontinued due to bradycardia.  -Warfarin as dosed by pharmacy     #Hypertension  -PTA amlodipine 5 mg daily     Heme  #Leukocytosis  New leukocytosis to 13.2 from 9.7 12/12. No new infectious symptoms    #R subclavian DVT  -Warfarin anticoagulation, pharmacy to dose     #Hypogammaglobulinemia  S/p IVIG  -Repeat IgG 12/15     Endocrine  #Type 2 diabetes  #Steroid-induced hyperglycemia  -Endocrine consulted while in acute hospital. Reconsulted 12/14 with anticipation of transitioning TF, appreciate recs              -Continue Lantus 30 mg BID              -High intensity sliding scale insulin q4h while on TF              - FSBG checks q4h                          -Transition  BG checks and sliding scale insulin to QID once regularly eating meals     #Hypothyroidism  TSH 3.17 on 11/19  -Continue levothyroxine 25 mcg daily     Renal  #BRENNAN, resolved  Cr 0.82 12/14  -Monitor intermittently     MSK/Rheum  #Rheumatoid arthritis  Previously treated with rituximab.  Rheumatology familiar and consulted early in admission.  -Steroids as above     GI/FEN  #Severe  malnutrition  Patient currently has PEG in place, receiving majority of nutrition through cycled tube feeds  -Continue multivitamin, folate, B6, B12 supplements  -TF per dietician, appreciate assistance.  Currently cycled  -Encourage p.o. intake     #hx of GI Bleed  Patient with progressive hypotension and CODE BLUE for bradycardia/asystole associated with GI bleed in the setting of anticoagulation, requiring massive transfusion protocol on 10/22.  EGD on 11/2 with clotted blood in stomach, adherent clot near PEG site that was clipped without active bleeding.  Most recent EGD 11/5 with ulcer noted near the PEG bumper site, gastritis, suction marks from the G-tube.  GJ tube was exchanged 11/9 by GI. Hgb 9.8 12/12  -Monitor hemoglobin intermittently.  -Continue PPI BID      Bowel/Bladder  Bowel, continent  Constipation  - Senna-docusate 1-2 tablets BID  - Miralax QDAY PRN  - Bisacodyl suppository 10mg QDAY PRN     Bladder, continent     1. Adjustment to disability:  Clinical psychology to eval and treat as indicated  2. FEN: Regular with thin liquids, cycled TF  3. Bowel: continent, meds as above  4. Bladder: continent  5. DVT Prophylaxis: warfarin  6. GI Prophylaxis: PPI  7. Code: full  8. Disposition: Local housing - [facility]  9. ELOS:  2-3 weeks  10. Rehab prognosis:  good  9. Follow up Appointments on Discharge:                -Neurology 1-2 months after discharge               -Outpatient cardiac/pulmonary Rehab               -Transplant Coordinator               -ID: Follow up with Dr. Abebe on 12/21/2021 (likely need to reschedule)     Patient was seen and discussed with attending physician, Dr. Cliff Austin MD  PGY-2  Physical Medicine & Rehabilitation

## 2021-12-14 NOTE — PLAN OF CARE
Discharge Planner Post-Acute Rehab PT:     Discharge Plan: [facility] with HCPT, then home to [place]    Precautions: Falls, dyspnea on exertion    Current Status:  Bed Mobility: Min A  Transfer: CGA with WW  Gait: CGA x 15 ft with WW  Stairs: 5 step ups with min A  Balance: Multiple LOB during session, recommending CGA at all times    Assessment:  Pt is below baseline for functional mobility s/p BSLT and prolonged hospital stay. He is on RA with SpO2 >95% during session. Severly limited endurance and SOB with even mild exertion. ELOS 2-3 weeks to improve durability and IND in order to d/c to [facility].     Other Barriers to Discharge (DME, Family Training, etc): Significant deconditioning

## 2021-12-14 NOTE — PLAN OF CARE
FOCUS/GOAL  Medication management, Skin integrity, Carryover of rehab techniques, and Safety management    ASSESSMENT, INTERVENTIONS AND CONTINUING PLAN FOR GOAL:  Pt was A/O, able to use call light and make needs known. Denies pain, SOB, chest pain nor dizziness. VSS. Assist of 1 walker with  transfers. On level 7 diet, thin liquids, takes medicines whole. Cycle TF at 80ml/hr with 100ml water flushes q 4hrs which started at 6 PM and ends at 6 AM tomorrow. Cont of BL, no BM this shift, LBM-12/13/21. BG monitored. PEG tube and old trach site dressings changed. Will continue with current POC.

## 2021-12-14 NOTE — PROGRESS NOTES
IP Diabetes Management  Daily Note           Assessment and Plan:   HPI: Edson Thornton is a 56 year old male with a PMH significant for NSIP/ILD, bronchiectasis, moderate PH, RA, SALTY, chronic HSV infection, hypogammaglobulinemia, steroid-induced diabetes, hypothyroidism, PFO, HTN, HLD, duodenal anomaly, anxiety, and depression.  Admitted on 9/5/21 from OSH for acute on chronic respiratory failure 2/2 ILD exacerbation, now s/p BSLT on 10/16/21.    His hospitalization has been very prolonged and complicated, including bilateral multifocal embolic strokes, difficulty weaning status post trach (now decannulated) and PEG placement, recurrent GI bleed with progressive hypotension and CODE BLUE call requiring massive transfusion protocol with ulcer noted at the PEG bumper site, encephalopathy, atrial fibrillation with RVR, BRENNAN, Candida empyema and candidemia currently on IV fluconazole, HCAP on IV ceftazidime, hypogammaglobulin anemia status post IVIG, and right subclavian DVT.  Admitted to ARU on 12/13/21.  Endocrine called to assist with insulin and TF, anticipating transition to bolus TF in coming days.  Endocrine had signed off on 11/19/21.     Assessment:   1)steroid induced Diabetes Mellitus, controlled (A1c 6.6 prior to transplant), with TF and steroid induced hyperglycemia  2) tube feeding induced hyperglycemia  3) S/P BSLT 10/16/21      Plan:    -NPH 30 units tonight at start of TF   -reduce AM Lantus to 15 units   -discontinue PM Lantus                 -Novolog high resistance sliding scale Q 4 hours    -start 1:15g CHO with meals, snacks, to start tomorrow AM                 -BG monitoring Q 4 hours                 -hypoglycemia protocol                 -diabetes education needs will be assessed closer to discharge, when TF plan solidified                 -on discharge, will recommend outpatient follow up with [facility]ology service or endocrinology closer to home in [place], [place]      Plan discussed  with primary team.     Interval History and Assessment: interval glucose trend reviewed:         BG largely within target on Lantus 30 units BID, no significant hypoglycemia when TF off, likely because no CHO coverage ordered.  TF is Vital 1.5 at 80 ml/hour on 6p-6a to provide 187g CHO.      May transiton to bolus feeds in coming days.  Will likely need some basal insulin (Lantus) on board as well as NPH to cover TF cycled on for 12 hours.  Once he switches to bolus feeds-will do fixed dose of novolog for that.      Will reduce AM Lantus to 15 units tomorrow AM and start 1:15g CHO coverage with breakfast.          Current nutritional intake and type: Orders Placed This Encounter      Combination Diet Regular Diet      Planned Procedures/surgeries: none  Steroid planning: pred 10 mg PO BID, taper    D5W-containing solutions/medications: medications as suspensions that likely contain glucose    PTA Diabetes Regimen:   Lantus 5 units daily  Novolog 1:15g CHO with meals  novolog 3-11 units with meals and bedtime, ?sliding scale insulin   BG monitor: One Touch Verio  Frequency of checks: unknown    Discharge Planning: TBD           Diabetes History:   Type of Diabetes: Steroid Induced Diabetes Mellitus, stress hyperglycemia  Lab Results   Component Value Date    A1C 5.3 11/15/2021    A1C 6.6 09/07/2021    A1C 6.5 09/05/2021              Review of Systems:     Unable to perform           Medications:     Current Facility-Administered Medications   Medication     acetaminophen (TYLENOL) tablet 975 mg     acetylcysteine (MUCOMYST) 20 % nebulizer solution 2 mL     albuterol (PROVENTIL) neb solution 2.5 mg     amLODIPine (NORVASC) tablet 5 mg     bisacodyl (DULCOLAX) Suppository 10 mg     calcium carbonate 600 mg-vitamin D 400 units (CALTRATE) per tablet 1 tablet     cefTAZidime (FORTAZ) 2 g vial to attach to  ml bag for ADULTS or NS 50 ml bag for PEDS     dextrose 10% infusion     glucose gel 15-30 g    Or     dextrose  50 % injection 25-50 mL    Or     glucagon injection 1 mg     [START ON 12/15/2021] escitalopram (LEXAPRO) tablet 20 mg     fiber modular (NUTRISOURCE FIBER) packet 1 packet     fluconazole (DIFLUCAN) intermittent infusion 400 mg in NaCl     fluticasone (FLONASE) 50 MCG/ACT spray 1 spray     folic acid-vit B6-vit B12 (FOLGARD) per tablet 1 tablet     furosemide (LASIX) tablet 40 mg     hydrOXYzine (ATARAX) tablet 25 mg     insulin aspart (NovoLOG) injection (RAPID ACTING)     insulin glargine (LANTUS PEN) injection 30 Units     levalbuterol (XOPENEX) neb solution 1.25 mg     levothyroxine (SYNTHROID/LEVOTHROID) tablet 25 mcg     Lidocaine (LIDOCARE) 4 % Patch 2 patch     lidocaine (viscous) (XYLOCAINE) 2 % solution 5 mL     lidocaine patch in PLACE     magnesium hydroxide (MILK OF MAGNESIA) suspension 30 mL     magnesium oxide (MAG-OX) tablet 400 mg     magnesium oxide (MAG-OX) tablet 800 mg     melatonin tablet 10 mg     menthol (ICY HOT) 5 % patch 1 patch     menthol (ICY HOT) Patch in Place     multivitamin w/minerals (THERA-VIT-M) tablet 1 tablet     mycophenolate (GENERIC EQUIVALENT) capsule 1,000 mg     naloxone (NARCAN) injection 0.2 mg    Or     naloxone (NARCAN) injection 0.4 mg    Or     naloxone (NARCAN) injection 0.2 mg    Or     naloxone (NARCAN) injection 0.4 mg     nystatin (MYCOSTATIN) suspension 1,000,000 Units     ondansetron (ZOFRAN-ODT) ODT tab 4 mg     oxyCODONE (ROXICODONE) tablet 5-10 mg     pantoprazole (PROTONIX) EC tablet 40 mg     Patient is already receiving anticoagulation with heparin, enoxaparin (LOVENOX), warfarin (COUMADIN)  or other anticoagulant medication     predniSONE (DELTASONE) tablet 10 mg     prochlorperazine (COMPAZINE) tablet 10 mg     QUEtiapine (SEROquel) tablet 25 mg     QUEtiapine (SEROquel) tablet 50 mg     rosuvastatin (CRESTOR) tablet 10 mg     senna-docusate (SENOKOT-S/PERICOLACE) 8.6-50 MG per tablet 1 tablet     sodium chloride (PF) 0.9% PF flush 10-20 mL      "sodium chloride (PF) 0.9% PF flush 10-40 mL     sulfamethoxazole-trimethoprim (BACTRIM) 400-80 MG per tablet 1 tablet     tacrolimus (GENERIC EQUIVALENT) capsule 2 mg     tacrolimus (GENERIC EQUIVALENT) capsule 2.5 mg     warfarin ANTICOAGULANT (COUMADIN) tablet 2 mg     Warfarin Therapy Reminder (Check START DATE - warfarin may be starting in the FUTURE)     zinc-white petrolatum (ILEX) 58.3 % paste 1 g            Physical Exam:    BP (!) 151/77 (BP Location: Left arm)   Pulse 98   Temp 97.4  F (36.3  C) (Oral)   Resp 20   Ht 1.646 m (5' 4.8\")   Wt 67.9 kg (149 lb 12.8 oz)   SpO2 94%   BMI 25.08 kg/m    Unable to perform-patient not answering telephone            Data:     Recent Labs   Lab 12/14/21  1142 12/14/21  1106 12/14/21  0753 12/14/21  0450 12/14/21  0019 12/13/21 2007   * 115* 134* 168* 206* 188*     Lab Results   Component Value Date    WBC 13.2 (H) 12/14/2021    WBC 9.7 12/12/2021    WBC 9.0 12/10/2021    HGB 11.5 (L) 12/14/2021    HGB 9.8 (L) 12/12/2021    HGB 9.3 (L) 12/10/2021    HCT 36.2 (L) 12/14/2021    HCT 30.6 (L) 12/12/2021    HCT 29.2 (L) 12/10/2021    MCV 90 12/14/2021    MCV 93 12/12/2021    MCV 93 12/10/2021     12/14/2021     (L) 12/12/2021     (L) 12/10/2021     Lab Results   Component Value Date     12/14/2021     12/12/2021     12/10/2021    POTASSIUM 5.0 12/14/2021    POTASSIUM 4.5 12/12/2021    POTASSIUM 4.9 12/11/2021    CHLORIDE 101 12/14/2021    CHLORIDE 108 12/12/2021    CHLORIDE 103 12/10/2021    CO2 25 12/14/2021    CO2 26 12/12/2021    CO2 27 12/10/2021     (H) 12/14/2021     (H) 12/14/2021     (H) 12/14/2021     Lab Results   Component Value Date    BUN 33 (H) 12/14/2021    BUN 34 (H) 12/12/2021    BUN 30 12/10/2021     Lab Results   Component Value Date    TSH 3.17 11/19/2021    TSH 7.90 (H) 10/29/2021    TSH 0.11 (L) 09/07/2021     Lab Results   Component Value Date    AST 17 12/12/2021    AST 19 " 12/10/2021    AST 17 12/08/2021    ALT 26 12/12/2021    ALT 24 12/10/2021    ALT 25 12/08/2021    ALKPHOS 104 12/12/2021    ALKPHOS 105 12/10/2021    ALKPHOS 107 12/08/2021           35 minutes spent on the date of the encounter doing chart review, history and exam, documentation and further activities per the note      Unable to physically see patient today and he was not answering phone to do virtual consult.  Will see tomorrow 12/15/21.    To contact Endocrine Diabetes service:   From 8AM-4PM: page inpatient diabetes provider that is following the patient  For questions or updates from 4PM-8AM: page the diabetes job code for on call fellow: [phone]    ILIR Salomon, CNP  Inpatient Diabetes Management Service  Pager [phone]

## 2021-12-14 NOTE — PLAN OF CARE
Occupational Therapy Discharge Summary    Reason for therapy discharge:    Discharged to acute rehabilitation facility.    Progress towards therapy goal(s). See goals on Care Plan in [facility] electronic health record for goal details.  Goals partially met.  Barriers to achieving goals:   discharge from facility.    Therapy recommendation(s):    Continued therapy is recommended.  Rationale/Recommendations:  Pt will benefit from continued therapy in ARU setting to maximize functional independence, strength and endurance.

## 2021-12-14 NOTE — PHARMACY-MEDICATION REGIMEN REVIEW
Pharmacy Medication Regimen Review  Edson Thornton is a 56 year old male who is currently in the Acute Rehab Unit.    Assessment: Upon review of the medications and patient chart the following irregularities were found:     Medications without defined durations:   -Fluconazole: Currently on for treatment of invasive candidiasis. Per transplant ID recommendations, planned stop date 12/21. Consider adding this to current order.   -Ceftazidime: Currently on for treatment of left lung cavitary lesion. Per transplant ID, planned stop date 12/21. Consider adding this to current order.     Significant Drug Interactions:   -Warfarin +Bactrim/Fluconazole: Reduced warfarin clearance and increased INR. Pharmacy will continue to monitor, warfarin dose requirements are likely to increase when patient completes fluconazole therapy.   -Fluconazole + Tacrolimus: Reduced tacrolimus clearance.     Other Recommendations:   -Consider adding hold parameters to amlodipine order.   -If patient tolerating escitalopram 10mg, could consider increasing to 20mg dose per psych recommendations from 12/3/21 consult note.      Plan:   1. Consider adding 12/21/21 stop date to fluconazole and ceftazidime orders.   2. Consider adding hold parameters to amlodipine order.   3. Consider increasing escitalopram dose 20mg daily if current dose tolerated by patient per 12/3/21 psych recommendations.     Attending provider will be sent this note for review.  If there are any emergent issues noted above, pharmacist will contact provider directly by phone.      Pharmacy will periodically review the resident's medication regimen for any PRN medications not administered in > 72 hours and discontinue them. The pharmacist will discuss gradual dose reductions of psychopharmacologic medications with interdisciplinary team on a regular basis.    Please contact pharmacy if the above does not answer specific medication questions/concerns.    Background:  A pharmacist  has reviewed all medications and pertinent medical history today.  Medications were reviewed for appropriate use and any irregularities found are listed with recommendations.      Current Facility-Administered Medications:      acetaminophen (TYLENOL) tablet 975 mg, 975 mg, Oral or Feeding Tube, Q8H, Johnny Coronado MD, 975 mg at 12/13/21 2021     acetylcysteine (MUCOMYST) 20 % nebulizer solution 2 mL, 2 mL, Nebulization, TID, Johnny Coronado MD, 2 mL at 12/14/21 0721     albuterol (PROVENTIL) neb solution 2.5 mg, 2.5 mg, Nebulization, Q2H PRN, Maury Lucero MD     amLODIPine (NORVASC) tablet 5 mg, 5 mg, Oral, Daily, Maury Lucero MD, 5 mg at 12/14/21 0809     bisacodyl (DULCOLAX) Suppository 10 mg, 10 mg, Rectal, Daily PRN, Maury Lucero MD     calcium carbonate 600 mg-vitamin D 400 units (CALTRATE) per tablet 1 tablet, 1 tablet, Oral or Feeding Tube, BID w/meals, Maury Lucero MD, 1 tablet at 12/14/21 0809     cefTAZidime (FORTAZ) 2 g vial to attach to  ml bag for ADULTS or NS 50 ml bag for PEDS, 2 g, Intravenous, Q8H, Maury Lucero MD, Last Rate: 200 mL/hr at 12/14/21 0451, 2 g at 12/14/21 0451     dextrose 10% infusion, , Intravenous, Continuous PRN, Johnny Coronado MD     glucose gel 15-30 g, 15-30 g, Oral, Q15 Min PRN **OR** dextrose 50 % injection 25-50 mL, 25-50 mL, Intravenous, Q15 Min PRN **OR** glucagon injection 1 mg, 1 mg, Subcutaneous, Q15 Min PRN, Maury Lucero MD     escitalopram (LEXAPRO) tablet 10 mg, 10 mg, Oral or Feeding Tube, Daily, Maury Lucero MD, 10 mg at 12/14/21 0809     fiber modular (NUTRISOURCE FIBER) packet 1 packet, 1 packet, Per Feeding Tube, BID, Maury Lucero MD, 1 packet at 12/13/21 2143     fluconazole (DIFLUCAN) intermittent infusion 400 mg in NaCl, 400 mg, Intravenous, Q24H, Maury Lucero MD     fluticasone (FLONASE) 50 MCG/ACT spray 1 spray, 1 spray, Both Nostrils, Daily, Maury Lucero MD, 1 spray at 12/14/21 0812     folic acid-vit  B6-vit B12 (FOLGARD) per tablet 1 tablet, 1 tablet, Oral, Daily, Maury Lucero MD, 1 tablet at 12/14/21 0813     furosemide (LASIX) tablet 40 mg, 40 mg, Oral, Daily, Maury Lucero MD, 40 mg at 12/14/21 0809     hydrOXYzine (ATARAX) tablet 25 mg, 25 mg, Oral, Q6H PRN, Maury Lucero MD     insulin aspart (NovoLOG) injection (RAPID ACTING), 1-12 Units, Subcutaneous, Q4H, Maury Lucero MD, 2 Units at 12/14/21 0450     insulin glargine (LANTUS PEN) injection 30 Units, 30 Units, Subcutaneous, BID, Maury Lucero MD, 30 Units at 12/14/21 0812     levalbuterol (XOPENEX) neb solution 1.25 mg, 1.25 mg, Nebulization, TID, Maury Lucero MD, 1.25 mg at 12/14/21 0721     levothyroxine (SYNTHROID/LEVOTHROID) tablet 25 mcg, 25 mcg, Oral, Daily, Maury Lucero MD, 25 mcg at 12/14/21 0809     Lidocaine (LIDOCARE) 4 % Patch 2 patch, 2 patch, Transdermal, Q24H, Maury Lucero MD     lidocaine (viscous) (XYLOCAINE) 2 % solution 5 mL, 5 mL, Mouth/Throat, Q2H PRN, Maury Lucero MD     lidocaine patch in PLACE, , Transdermal, Q8H, Johnny Coronado MD     magnesium hydroxide (MILK OF MAGNESIA) suspension 30 mL, 30 mL, Oral, Daily PRN, Maury Lucero MD     magnesium oxide (MAG-OX) tablet 400 mg, 400 mg, Oral, QPM, Maury Lucero MD, 400 mg at 12/13/21 1727     magnesium oxide (MAG-OX) tablet 800 mg, 800 mg, Oral, QAM, Maury Lucero MD, 800 mg at 12/14/21 0809     melatonin tablet 10 mg, 10 mg, Oral, At Bedtime PRN, Johnny Coronado MD     menthol (ICY HOT) 5 % patch 1 patch, 1 patch, Topical, Q8H PRN, Maury Lucero MD     menthol (ICY HOT) Patch in Place, , Transdermal, Q8H, Johnny Coronado MD     multivitamin w/minerals (THERA-VIT-M) tablet 1 tablet, 1 tablet, Oral, Daily, Maury Lucero MD, 1 tablet at 12/14/21 0809     mycophenolate (GENERIC EQUIVALENT) capsule 1,000 mg, 1,000 mg, Oral, BID, Maury Lucero MD, 1,000 mg at 12/14/21 0808     naloxone (NARCAN) injection 0.2 mg, 0.2 mg, Intravenous, Q2  Min PRN **OR** naloxone (NARCAN) injection 0.4 mg, 0.4 mg, Intravenous, Q2 Min PRN **OR** naloxone (NARCAN) injection 0.2 mg, 0.2 mg, Intramuscular, Q2 Min PRN **OR** naloxone (NARCAN) injection 0.4 mg, 0.4 mg, Intramuscular, Q2 Min PRN, Johnny Coronado MD     nystatin (MYCOSTATIN) suspension 1,000,000 Units, 1,000,000 Units, Swish & Swallow, 4x Daily, Maury Lucero MD, 1,000,000 Units at 12/14/21 0808     ondansetron (ZOFRAN-ODT) ODT tab 4 mg, 4 mg, Oral, Q6H PRN, Maury Lucero MD     oxyCODONE (ROXICODONE) tablet 5-10 mg, 5-10 mg, Oral, Q4H PRN, Maury Lucero MD     pantoprazole (PROTONIX) EC tablet 40 mg, 40 mg, Oral, BID AC, Maury Lucero MD, 40 mg at 12/14/21 0809     Patient is already receiving anticoagulation with heparin, enoxaparin (LOVENOX), warfarin (COUMADIN)  or other anticoagulant medication, , Does not apply, Continuous PRN, Johnny Coronado MD     predniSONE (DELTASONE) tablet 10 mg, 10 mg, Oral or Feeding Tube, BID, Maury Lucero MD, 10 mg at 12/14/21 0809     prochlorperazine (COMPAZINE) tablet 10 mg, 10 mg, Oral, Q6H PRN, Maury Lucero MD     QUEtiapine (SEROquel) tablet 25 mg, 25 mg, Oral, TID PRN, Maury Lucero MD     QUEtiapine (SEROquel) tablet 50 mg, 50 mg, Oral, At Bedtime, Maury Lucero MD, 50 mg at 12/13/21 2144     rosuvastatin (CRESTOR) tablet 10 mg, 10 mg, Oral or Feeding Tube, Daily, Maury Lucero MD, 10 mg at 12/14/21 0809     senna-docusate (SENOKOT-S/PERICOLACE) 8.6-50 MG per tablet 1 tablet, 1 tablet, Oral or Feeding Tube, At Bedtime PRN, Maury Luceor MD     sodium chloride (PF) 0.9% PF flush 10-20 mL, 10-20 mL, Intracatheter, q1 min prn, Johnny Coronado MD     sodium chloride (PF) 0.9% PF flush 10-40 mL, 10-40 mL, Intracatheter, Q8H, Johnny Coronado MD, 30 mL at 12/14/21 0814     sulfamethoxazole-trimethoprim (BACTRIM) 400-80 MG per tablet 1 tablet, 1 tablet, Oral or Feeding Tube, Daily, Maury Lucero MD, 1 tablet at 12/14/21 0809      tacrolimus (GENERIC EQUIVALENT) capsule 2 mg, 2 mg, Oral, QPM, Maury Lucero MD, 2 mg at 12/13/21 1731     tacrolimus (GENERIC EQUIVALENT) capsule 2.5 mg, 2.5 mg, Oral, QAM, Maury Lucero MD, 2.5 mg at 12/14/21 0809     Warfarin Therapy Reminder (Check START DATE - warfarin may be starting in the FUTURE), 1 each, Does not apply, Continuous PRN, Maury Lucero MD     zinc-white petrolatum (ILEX) 58.3 % paste 1 g, 1 applicator, Topical, Q1H PRN, Maury Lucero MD  No current outpatient prescriptions on file.   PMH: ILD and rheumatoid lung dx, RA, SALTY, hypothyroid, HTN,anxiety, depression, HLD, duodenal anomaly, AF, lung transplant 10/16/21    David Siddiqi, ShahrzadD

## 2021-12-14 NOTE — PLAN OF CARE
Discharge Planner Post-Acute Rehab OT:     Discharge Plan: to [facility] with fiance    Precautions: sternal, fall    Current Status:  ADLs:    Mobility: CGA for in room mobility using walker    Grooming: CGA standing at the sink, chair behind for intermittent rest breaks    Dressing: TBD    Bathing: TBD    Toileting: CGA for transfer using walker  IADLs: fiance will assist  Vision/Cognition: L visual deficit at baseline and new right eye blind spot. Pt scored 24/3 on the SLUMS indicating mild deficit    Assessment: Eval completed, pt is very deconditioned and with significant tremors impacting ADL. Pt will discharge to [facility] initially with his fiance. ELOS is 2-3 weeks pending progress.     Other Barriers to Discharge (DME, Family Training, etc): poor activity endurance

## 2021-12-14 NOTE — PROGRESS NOTES
CLINICAL NUTRITION SERVICES - ASSESSMENT NOTE     Nutrition Prescription    RECOMMENDATIONS FOR MDs/PROVIDERS TO ORDER:  Update RD if there is a need to consider TF formula change due to K+ trends     Malnutrition Status:    Severe malnutrition in the context of acute on chronic illness or injury     Recommendations already ordered by Registered Dietitian (RD):  None today - RD placed orders for TF as below upon pt admission to ARU on 12/14    Future/Additional Recommendations:  Monitor K+ trends and may need to consider formula change if no improvements  Monitor po intakes via calorie counts to assess ability to wean pt from tube feeding  Monitor weight and lab trends      REASON FOR ASSESSMENT  Edson Thornton is a/an 56 year old male assessed by the dietitian for Provider Order - Registered Dietitian to Assess and Order TF per Medical Nutrition Therapy Protocol    NUTRITION/MEDICAL HISTORY  Per chart review: Pt admits to ARU for rehabilitation in the setting of s/p bilateral lung transplant on 10/16/2021 with significantly prolonged hospitalization complicated by prolonged vent wean s/p trach and PEG placement on 10/29, encephalopathy and diffuse bilateral upper extremity weakness, found to have multifocal 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  Patient was transferred from ICU to medicine on 11/23/2021 and de cannulated 12/8/2021. 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    Pt admits to ARU on 12/13, RD ordered TF as pt was on prior to hospital discharge,  noted pt on cycled TF regimen with Vital 1.5, regimen reduced from 16 hr infusion to 12 hr infusion on 12/10 to encourage po intakes.    RD visited pt and family at bedside today (12/14), introduced self and reviewed TF plan of care, noted TF reductions recently prior to admit to ARU, so plan will be to continue TF as ordered, consider adjustments to TF in the coming days based on calorie count documentation, RD reviewed orders placed on 12/14 for PRN supplements of Ensure Clear or Gelatein, pt prefers PRN orders as has extra serving in room from transfer, RD encouraged pt to take po as tolerated and encouraged pt to remind nursing of any outside foods or supplement stock in room so pt gets credit for po intakes and we can continue to wean TF, pt/family at bedside verbalize understanding and agreement with plan.     CURRENT NUTRITION ORDERS  Diet: 2 g Potassium  Intake/Tolerance: To be determined, RD ordered calorie count on 12/13 to start today(12/14)  Hospital calorie counts:  12/12 - 473  kcals, 25 gm protein (1 meal, 1 supplement)  12/11 - 0 kcals,  0 gm protein (0 meals, 0 supplement)    Supplements: RD ordered Ensure Clear or Gelatein PRN upon pt's admit to ARU on 12/13    Nutrition Support: RD ordered cycled J-TF with Vital 1.5 at 80 ml/hr x12 hrs (1 L per night) from 6 pm to 6 am with 100 ml water flush q 4 hrs to provide 1500 kcals, 67 gm protein, 187 gm CHO, 6 gm fiber, 1364 ml water (additionally noted provides 2000 mg K+) - meeting 73% lower end kcal needs, 83% lower end protein needs, 67% water needs     LABS  Labs reviewed, K+ trends noted     MEDICATIONS  PRN Oxycodone, Coumadin, Lexapro, High resistance correction scale insulin q 4 hrs, insulin to gm CHO dosing at meals and snacks, Lantus, NPH insulin (given for TF cycle), Diflucan, Crestor, Seroquel, Tacrolimus, Mycophenolate, Fortaz, Prednisone, Lasix, Folgard, Nutrisource Fiber BID, Caltrate, Mag-ox, Bactrim, Nystatin swish/swallow, MVI,  "Synthroid, Protonix     ANTHROPOMETRICS  Height: 164.6 cm (5' 4.8\")  Most Recent Weight: 67.9 kg (149 lb 12.8 oz)    IBW: 61.8 kg  BMI: Overweight BMI 25-29.9  Weight History:   12/12/21 0600 67 kg (147 lb 11.2 oz)   12/05/21 0500 66 kg (145 lb 8.1 oz)   11/12/21 0000 66.8 kg (147 lb 4.3 oz)   09/12/21 0500 68.6 kg (151 lb 3.8 oz)   09/05/21 1752 73.3 kg (161 lb 8 oz)      75.6 kg (166 lb 10.7 oz) 09/05/2021 - per CE     Weight assessment: Weight up 4 lbs in 1 week, up 2 lbs in 1 month, significant 10.2% weight loss in 3 months.     Dosing Weight: 67.9 kg    ASSESSED NUTRITION NEEDS  Estimated Energy Needs: 0422-5034 kcals/day (30 - 35 kcals/kg)  Justification: Repletion, now >8 weeks transplant   Estimated Protein Needs:  grams protein/day (1.2 - 1.5 grams of pro/kg)  Justification: Repletion, now >8 weeks transplant   Estimated Fluid Needs: 2035 mL/day (30 mL/kg)   Justification: Maintenance    PHYSICAL FINDINGS  Poor dental hygiene/chipped tooth noted in Provider notes    MALNUTRITION  % Intake: Intake doesn't meet criteria as pt is being supplemented with EN support   % Weight Loss: > 7.5% in 3 months (severe)  Subcutaneous Fat Loss: Facial region: mild   Muscle Loss: Temporal: mild   Fluid Accumulation/Edema: None noted  Malnutrition Diagnosis: Severe malnutrition in the context of acute on chronic illness or injury     NUTRITION DIAGNOSIS  Inadequate oral intake vs increased nutrient needs related to decreased appetite/post transplant status as evidenced by continued need for enteral nutrition support and therapeutic recommendations       INTERVENTIONS  Implementation  Nutrition Education: RD reviewed nutrition plan of care with pt/wife at bedside - both agreeing with plan    Calorie count - ordered upon admit  Enteral Nutrition - cycled TF ordered upon admit   Feeding tube flush - ordered upon admit   Medical food supplement therapy - PRN ordered upon admit - pt agreeing to continue PRN order "     Goals  Patient to consume % of nutritionally adequate meal trays TID, or the equivalent with supplements/snacks to be able to be wean from tube feeding.     Monitoring/Evaluation  Progress toward goals will be monitored and evaluated per protocol.    Zenaida Washington RD, CNSC, LD  ARU RD pager: [phone]  Weekend/holiday pager: [phone]

## 2021-12-14 NOTE — PROGRESS NOTES
"[facility], [place]   Internal Medicine Daily Note           Interval History/Events     Overnight events reviewed  Reports doing well  No nausea, vomiting, chest pain, shortness of breath           Review of Systems        4 point ROS including Respiratory, CV, GI and , other than that noted above is negative      Medications   I have reviewed current medications  in the \"current medication\" section of Epic.  Relevant changes include:     Physical Exam   General:       Vital signs:    Blood pressure (!) 151/77, pulse 98, temperature 97.4  F (36.3  C), temperature source Oral, resp. rate 20, height 1.646 m (5' 4.8\"), weight 67.9 kg (149 lb 12.8 oz), SpO2 94 %.  Estimated body mass index is 25.08 kg/m  as calculated from the following:    Height as of this encounter: 1.646 m (5' 4.8\").    Weight as of this encounter: 67.9 kg (149 lb 12.8 oz).      Intake/Output Summary (Last 24 hours) at 12/14/2021 1525  Last data filed at 12/14/2021 1346  Gross per 24 hour   Intake 1290 ml   Output 1655 ml   Net -365 ml        Constitutional: Sitting on chair in no acute distress  Eye: No icterus, no pallor  Mouth/ENT: Normal oral mucosa  Cardiovascular: S1, S2 normal.   Respiratory: B/L CTA  GI: Soft, NT, BS+  :   Neurology: Alert, awake, and oriented. No focal neuro deficit.   Psych:   MSK:   Integumentary:   Heme/Lymph/Imm:      Laboratory and Imaging Studies     I have reviewed  laboratory and imaging studies in the Epic. Pertinent findings are as below:    BMP  Recent Labs   Lab 12/14/21  1142 12/14/21  1106 12/14/21  0753 12/14/21  0450 12/12/21  0933 12/12/21  0519 12/11/21  0819 12/11/21  0649 12/10/21  0823 12/10/21  0647 12/09/21  1227 12/09/21  0641   NA  --  135  --   --   --  141  --   --   --  138  --  138   POTASSIUM  --  5.0  --   --   --  4.5  --  4.9  --  4.7  --  4.7   CHLORIDE  --  101  --   --   --  108  --   --   --  103  --  105   ERIK  --  9.9  --   --   --  8.6  --   --   --  " 9.0  --  9.1   CO2  --  25  --   --   --  26  --   --   --  27  --  28   BUN  --  33*  --   --   --  34*  --   --   --  30  --  28   CR  --  0.82  --   --   --  1.00  --   --   --  0.69  --  0.64*   * 115* 134* 168*   < > 167*   < >  --    < > 203*   < > 153*    < > = values in this interval not displayed.     CBC  Recent Labs   Lab 12/14/21  1106 12/12/21  0519 12/10/21  0647 12/09/21  0641   WBC 13.2* 9.7 9.0 9.2   RBC 4.01* 3.30* 3.15* 3.15*   HGB 11.5* 9.8* 9.3* 9.5*   HCT 36.2* 30.6* 29.2* 29.3*   MCV 90 93 93 93   MCH 28.7 29.7 29.5 30.2   MCHC 31.8 32.0 31.8 32.4   RDW 13.8 14.2 14.3 14.6    144* 141* 142*     INR  Recent Labs   Lab 12/14/21  1106 12/13/21  0539 12/12/21  0519 12/11/21  0649   INR 2.25* 2.57* 2.52* 2.24*     LFTs  Recent Labs   Lab 12/12/21  0519 12/10/21  0647 12/08/21  0604   ALKPHOS 104 105 107   AST 17 19 17   ALT 26 24 25   BILITOTAL 0.2 0.5 0.2   PROTTOTAL 5.8* 5.9* 5.7*   ALBUMIN 2.5* 2.5* 2.3*      PANCNo lab results found in last 7 days.        Impression/Plan      55 yo M with PMHx of NSIP/ILD 2/2 Rheumatoid lung disease, rheumatoid arthritis, bronchiectasis, pulmonary HTN, SALTY, essential HTN, HLD, PLD, hypogammaglobinemia, duodenal anomaly, anxiety, and depression [facility] for ongoing rehabilitation after hospitalization at [facility] from 09/05/2021 to 12/13/2021.  Patient was initially admitted to the hospital on 9/5/2021 with acute on chronic hypoxemic respiratory failure for transplant work-up.  He underwent bilateral lung transplant on 10/16/2021, with post operative course complicated by prolonged ventilator wean s/p trach and PEG/J tube placement with thoracic surgery on 10/29. Post-operative course complicated encephalopathy, subacute CVA, afib with RVR, BRENNAN, candidemia/candida empyema, and brief bradycardic arrest and subsequent hypotension in the setting of gastrointestinal blood loss.  His tracheostomy  was decannulated on 12/8/2021. He currently is stable off of oxygen. He is feeding through PEG tube.      ILD and NSIP s/p BSLT on 10/16/2021  Acute on chronic hypoxic respiratory failure s/p tracheostomy on 10/29/21 and decannulation on 12/8/2021  Bilateral pleural effusions, improved  * Transplant pulmonology consultation appreciated  * Immunosuppression: s/p basiliximab on 10/16 and 10/20.On tacrolimus, MMF, prednisone per transplant pulmonary medicine recommendations  * Prophylaxis: On bactrim 400-80 mg daily, nystatin QID x6 months  - Continue immunosuppression and prophylaxis as above  - DSA every 2 weeks   - Levalbuterol and mucomyst QID and pulm toilet and chest PT QID  - PT/OT/SLP   -Continue 40 mg of Lasix daily  - Oxycodone 5-10 mg Q4H prn, would aggressively taper this over the course of the next week to 2 weeks  - Schedule tylenol  - Tacrolimus level on 12/16  - CBC, BMP weekly      # Left lung cavitary lesion   Suspect aspiration vs pseudomonal vs Klebsiella pneumonia related abscess. CT on 10/25 with LLL nodular opacity. Repeat CT chest on 11/22 with new cavitary lesion in the left lung base, and with multifocal bilateral upper lobe GGO (some of which are new), new 1.8 cm fluid collection with surrounding fat stranding in the left axilla, decreased bilateral loculated pleural effusions. Indeterminate Quant Gold, but negative tuberculin skin tests on mulitple occurences. S/p BAL on 11/22. ID feels less likely fungal. BD glucan positive but has known candidemia. Cocci antigen in urine negative, serum histoplasmosis negative, CRAG negative.  Appreciate transplant infectious disease consultation  - IV Ceftazdime 2 grams Q8H (until 12/21)  - Follow up CT chest in 4 weeks on 12/21  - Please include in the patient's discharge instructions, follow up with Dr. Abebe on 12/21/2021 @6:30 PM in a virtual visit.      # Klebsiella pneumoniae and Pseudomonas fluorescens/putida HAP  Klebsiella initially noted trach  sputum culture 11/10. Bronch culture 11/12 with Klebsiella and Pseudomonas fluorescens/putida (R-meropenem).     - Treat as above      # Invasive candidiasis with Candida albicans  Positive candida BCx 10/20 and 10/22.  BDG fungitell positive (399) on 10/20. Sputum Cx + Candida albicans persistently.  TC 10/23 without evidence of endocarditis. Ophthalmology consult 10/24 with benign dilated fundoscopic exam.  Candida empyema also noted 10/25, chest tubes inadvertently removed by CVTS 10/28, left chest tube replaced by IR 11/3 as above with ongoing Candida on cultures. Repeat pleural fungal cultures and fungal/bacterial blood cultures on 11/18 NGTD.   * No longer has any chest tubes in place  * Initially on Micafungin (10/22-10/27) transition to fluconazole 400 mg IV daily (start date 10/26 end date 12/21)  - Continue Fluconazole 400 mg IV daily     # Hypogammaglobinemia  Received IVIG with plan to repeat IgG on 12/15.    # Encephalopathy, resolved  # Difficulty sleeping  Likely multi-factorial in the setting of stroke, prolonged critical illness.  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 MRI brain on 10/23 with multifocal subacute infarct within both cerebral hemispheres and left cerebellum. Presumed embolic with afib hx. He is currently able to ambulate with relative ease.  - Anticoagulation as noted below.     # Atrial fibrillation  [id number] 4 for HTN, CVA, and DM2. First noted on 10/18, started on amiodarone drip, and converted to NSR. Metoprolol and amidoarone discontinued on 11/20 due to intermittent bradycardia.   - Anticoagulation with warfarin as noted below.  - Continue Rosuvastatin 10 mg daily.     # Right subclavian DVT   Diagnosed on 11/4 on ultrasound.  - Continue warfarin per pharmacy  protocol     # DM2 with steroid induced hyperglycemia  Hemoglobin A1c 6.6 pre-operatively. Endocrine recently consulted for steroids induced hyperglycemia.   - Continue Lantus 30 mg BID  - Novolog High Sliding scale insulin Q4H; once patient is off tube feeds could transition to 4 times daily with meals.  -Blood glucose Q4H; once patient is off tube feeds could transition to 4 times daily with meals.     # Severe malnutrition in the context of acute on chronic illness  Patient currently has a PEG J-tube and is getting the Jorde of nutrition through cycle tube feeds.  -Multivitamin, folic acid, B6, B12 supplements   - TF per nutrition; continue oral diet as well  -Hopefully over the next couple of weeks we can decrease tube feeds and allow the patient to eat more food by mouth.  Currently on cycle tube feeds.     # Anxiety and depression   Psychiatry reconsulted and after discussion suggest increasing lexapro. Will discuss with patient regarding starting ritalin in the AM for motivation.  - Lexapro increased to 10 mg daily could increase to 20 in a week ~12/12.  -As needed hydroxyzine.     # Concern for chipped tooth  # Poor dental hygiene  Patient noted he feels like he may have chipped his tooth or has a loose tooth after eating guevara. It is not painful.   - Dental hygiene consult placed and chipped tooth stable rec oral hygiene cares.     # Rheumatoid arthritis- Dx 5/2021 with + CCP antibody and RF. Previously treated with rituximab. Rheum consulted early in admission. On steroids as noted above.   # SALTY - Uses CPAP at bedtime prior to admission.  # HTN- Continue PTA amlodipine.   # Hypothyroidism - TSH 3.17 on 11/19/21. Continue PTA levothyroxine 25 mcg daily.     # Recurrent GIB - hgb drop on 10/22 s/p 2 unit pRBC transfusion with EGD on 10/23 with NJ/OG tube trauma with scant oozing. Patient then developed progressive hypotension and ultimately CODE BLUE for GIB in setting of anticoagulation with heparin drip,  s/p massive transfusion protocol. EGD on 11/2 with large amount of clotted blood in stomach and area of raised mucosa with small adherent clot near PEG tube site that was clipped, no active bleeding.  Maroon stools 11/3, repeat EGD with extensive old blood in stomach, no active bleeding, small nodular area with prior clips clipped again.  Most recent EGD 11/5 with ulcer noted at PEG tube bumper site, gastritis, and suction marks from G tube. TF noted in G tube drainage bag 11/7, AXR with GJ tube tip projecting over proximal duodenum. GJ tube exchanged 11/9 by GI.  - PO PPI BID      # HSV  Chronic intermittent active infection pre-transplant with recent HSV infection: crusted lesions throughout left side of jaw, s/p 10 day treatment course of ACV through 10/9.  HSV PCR blood negative 10/17.  S/p ACV ppx as above (started POD #1 instead of POD #8 given HSV history and location).     # Diet:  Tube feeds and regular diet  # DVT Prophylaxis: Heparin SQ  # Watson Catheter: Not present  # Central Lines: None  # Code Status: Full Code    Pt's care was discussed with bedside RN, patient and  during Care Team Rounds.               Maury Lucero MD  Hospitalist ( Internal medicine)  Pager: [phone]

## 2021-12-14 NOTE — PLAN OF CARE
"FOCUS/GOAL  Medical management    ASSESSMENT, INTERVENTIONS AND CONTINUING PLAN FOR GOAL:  Admitted yesterday, patient had hard time to fall asleep and had intermittent sleep due to  Scheduled medication/treatment.He would like to sleep throughout the night w/o interruption while he has BG check Q 4H ( midnight and 0400 for NOC) plus an IV at 04:00. He was very upset at 0400 for the IV infusion. When the light was on to administer the scheduled IV medication, patient started screaming at writer  Stating he need his sleep,, he does not see why  We are coming into his room so many times when he needs to sleep. Care explained, patient seemed understanding and apologized. The reaction was way more than the stimulus, he was shaking and become \"red\" screaming at the to  of his lungs. After a little conversation patient told writer that he has been in the hospital for 6 month  And he is tired!  Sticky note sent to provider  To discuss the importance of his med and  contacted via email to have patient speak to some one to decompress..  He declined Tylenol at 0400.  Scheduled Tylenol at 07:30 not given and advanced for 0800 to allow rest.  Only on lumen assessed of the PICC line assessed this shift ( purple) due to patient's reaction before IV administration. Blood return present.Voided using urinal. Will continue with POC.  "

## 2021-12-14 NOTE — PROGRESS NOTES
"   12/14/21 0800   Quick Adds   Type of Visit Initial PT Evaluation      Language English   Living Environment   People in home spouse   Living Environment Comments Will go to [facility] after discharge. Home is in [place], [place]. 2 PILO and 12 stairs to bedroom.    Self-Care   Usual Activity Tolerance fair   Current Activity Tolerance poor   Regular Exercise No   Equipment Currently Used at Home shower chair;walker, rolling   Activity/Exercise/Self-Care Comment Pt used O2 prior to admission, fiance did IADL and started assisting with ADL. Pt was a .    Disability/Function   Hearing Difficulty or Deaf no   Concentrating, Remembering or Making Decisions Difficulty no   Difficulty Communicating no   Difficulty Eating/Swallowing no   Walking or Climbing Stairs Difficulty yes   Dressing/Bathing Difficulty yes   Toileting issues yes   Doing Errands Independently Difficulty (such as shopping) yes   Fall history within last six months no   Change in Functional Status Since Onset of Current Illness/Injury yes   General Information   Onset of Illness/Injury or Date of Surgery 09/05/21   Referring Physician Dr. Lan Coronado   Patient/Family Therapy Goals Statement (PT) \"To be able to walk and do the stairs on my own\"   Pertinent History of Current Problem (include personal factors and/or comorbidities that impact the POC) \"55 yo M with PMHx of NSIP/ILD 2/2 Rheumatoid lung disease, rheumatoid arthritis, bronchiectasis, pulmonary HTN, SALTY, essential HTN, HLD, PLD, hypogammaglobinemia, duodenal anomaly, anxiety, and depression is [facility] for ongoing rehabilitation after hospitalization at [facility] from 09/05/2021 to 12/13/2021.  Patient was initially admitted to the hospital on 9/5/2021 with acute on chronic hypoxemic respiratory failure for transplant work-up.  He underwent bilateral lung transplant on 10/16/2021, with post operative course complicated by " "prolonged ventilator wean s/p trach and PEG/J tube placement with thoracic surgery on 10/29. Post-operative course complicated encephalopathy, subacute CVA, afib with RVR, BRENNAN, candidemia/candida empyema, and brief bradycardic arrest and subsequent hypotension in the setting of gastrointestinal blood loss.  His tracheostomy was decannulated on 12/8/2021. He currently is stable off of oxygen. He is feeding through PEG tube.\"   Existing Precautions/Restrictions fall   Heart Disease Risk Factors High blood pressure;Lack of physical activity;Dislipidemia;Overweight;Stress;Medical history;Gender;Age   General Observations SOB with all exertion. But SpO2 remains >95% on RA. HRs >120 bpm throughout session.    Cognition   Cognitive Status Comments Appropraite and conversational, mild anxiety noted   Pain Assessment   Patient Currently in Pain No   Integumentary/Edema   Integumentary/Edema Comments No swelling in BLE. Trach site dressed, multiple healing incisions to anterior abdomen. PEG tube in place. Abraisions to chin and lips.    Posture    Posture Forward head position;Kyphosis   Range of Motion (ROM)   ROM Comment ROM WFL throughout, but tightness and restriction noted in chest wall and thoracic spine.    Strength   Strength Comments Generally deconditioned - SOB with exertion d/t debility. 4/5 in all muscles of BLE. Very SOB during static muscle testing   ARC Assessment Only   Acute Rehab Functional Assessment See IP Rehab Daily Documentation Flowsheet for Functional Mobility/ADL Assessment   Balance   Balance Comments Multiple posterior LOB requiring min A to prevent falls.    Sensory Examination   Sensory Perception Comments Endorses numbness in B feet distal to ankles. Intact proptioception, light touch, vibration, and protective sensation.    Coordination   Coordination Comments Intention tremors in BLE and BUE. States it prevents him from using hands well.    Muscle Tone   Muscle Tone no deficits were " identified   Clinical Impression   Criteria for Skilled Therapeutic Intervention yes, treatment indicated   PT Diagnosis (PT) debility, difficulty walking   Influenced by the following impairments see clinical impression comments   Functional limitations due to impairments see clinical impression comments   Clinical Presentation Unstable/Unpredictable   Clinical Presentation Rationale Highly complex d/t multisystem deficits and prolonged hospitalization. Likely with cog deficits that may impair IND.    Clinical Decision Making (Complexity) high complexity   Therapy Frequency (PT) Daily   Predicted Duration of Therapy Intervention (days/wks) 2-3 weeks   Planned Therapy Interventions (PT) balance training;bed mobility training;gait training;home exercise program;neuromuscular re-education;ROM (range of motion);stair training;patient/family education;postural re-education;home program guidelines;progressive activity/exercise;risk factor education;wheelchair management/propulsion training;transfer training;strengthening;stretching   Anticipated Equipment Needs at Discharge (PT) walker, rolling   Risk & Benefits of therapy have been explained evaluation/treatment results reviewed;risks/benefits reviewed;care plan/treatment goals reviewed;current/potential barriers reviewed;participants voiced agreement with care plan;participants included;patient   Clinical Impression Comments Pt is below baseline for functional mobility s/p BSLT and prolonged hospital stay. He is on RA with SpO2 >95% during session. Severly limited endurance and SOB with even mild exertion. ELOS 2-3 weeks to improve durability and IND in order to d/c to [facility].    Total Evaluation Time   Total Evaluation Time (Minutes) 30

## 2021-12-15 ENCOUNTER — APPOINTMENT (OUTPATIENT)
Dept: PHYSICAL THERAPY | Facility: CLINIC | Age: 56
End: 2021-12-15
Attending: PHYSICAL MEDICINE & REHABILITATION
Payer: COMMERCIAL

## 2021-12-15 ENCOUNTER — APPOINTMENT (OUTPATIENT)
Dept: OCCUPATIONAL THERAPY | Facility: CLINIC | Age: 56
End: 2021-12-15
Attending: PHYSICAL MEDICINE & REHABILITATION
Payer: COMMERCIAL

## 2021-12-15 ENCOUNTER — APPOINTMENT (OUTPATIENT)
Dept: SPEECH THERAPY | Facility: CLINIC | Age: 56
End: 2021-12-15
Attending: PHYSICAL MEDICINE & REHABILITATION
Payer: COMMERCIAL

## 2021-12-15 LAB
BASOPHILS # BLD AUTO: 0 10E3/UL (ref 0–0.2)
BASOPHILS NFR BLD AUTO: 0 %
CMV DNA SPEC NAA+PROBE-ACNC: NOT DETECTED IU/ML
EOSINOPHIL # BLD AUTO: 0 10E3/UL (ref 0–0.7)
EOSINOPHIL NFR BLD AUTO: 0 %
ERYTHROCYTE [DISTWIDTH] IN BLOOD BY AUTOMATED COUNT: 14.2 % (ref 10–15)
GLUCOSE BLDC GLUCOMTR-MCNC: 104 MG/DL (ref 70–99)
GLUCOSE BLDC GLUCOMTR-MCNC: 143 MG/DL (ref 70–99)
GLUCOSE BLDC GLUCOMTR-MCNC: 170 MG/DL (ref 70–99)
GLUCOSE BLDC GLUCOMTR-MCNC: 231 MG/DL (ref 70–99)
GLUCOSE BLDC GLUCOMTR-MCNC: 86 MG/DL (ref 70–99)
HCT VFR BLD AUTO: 31.7 % (ref 40–53)
HGB BLD-MCNC: 10.2 G/DL (ref 13.3–17.7)
HOLD SPECIMEN: NORMAL
IGG SERPL-MCNC: 365 MG/DL (ref 610–1616)
IMM GRANULOCYTES # BLD: 0.2 10E3/UL
IMM GRANULOCYTES NFR BLD: 2 %
INR PPP: 2.41 (ref 0.86–1.14)
LYMPHOCYTES # BLD AUTO: 0.9 10E3/UL (ref 0.8–5.3)
LYMPHOCYTES NFR BLD AUTO: 8 %
MCH RBC QN AUTO: 29 PG (ref 26.5–33)
MCHC RBC AUTO-ENTMCNC: 32.2 G/DL (ref 31.5–36.5)
MCV RBC AUTO: 90 FL (ref 78–100)
MONOCYTES # BLD AUTO: 0.9 10E3/UL (ref 0–1.3)
MONOCYTES NFR BLD AUTO: 8 %
NEUTROPHILS # BLD AUTO: 9 10E3/UL (ref 1.6–8.3)
NEUTROPHILS NFR BLD AUTO: 82 %
NRBC # BLD AUTO: 0 10E3/UL
NRBC BLD AUTO-RTO: 0 /100
PLATELET # BLD AUTO: 193 10E3/UL (ref 150–450)
POTASSIUM BLD-SCNC: 4.4 MMOL/L (ref 3.4–5.3)
RBC # BLD AUTO: 3.52 10E6/UL (ref 4.4–5.9)
WBC # BLD AUTO: 11 10E3/UL (ref 4–11)

## 2021-12-15 PROCEDURE — 84132 ASSAY OF SERUM POTASSIUM: CPT | Performed by: INTERNAL MEDICINE

## 2021-12-15 PROCEDURE — 250N000011 HC RX IP 250 OP 636: Performed by: PHYSICAL MEDICINE & REHABILITATION

## 2021-12-15 PROCEDURE — 128N000003 HC R&B REHAB

## 2021-12-15 PROCEDURE — 999N000157 HC STATISTIC RCP TIME EA 10 MIN

## 2021-12-15 PROCEDURE — 92610 EVALUATE SWALLOWING FUNCTION: CPT | Mod: GN

## 2021-12-15 PROCEDURE — 99232 SBSQ HOSP IP/OBS MODERATE 35: CPT | Mod: 24 | Performed by: PHYSICAL MEDICINE & REHABILITATION

## 2021-12-15 PROCEDURE — 99232 SBSQ HOSP IP/OBS MODERATE 35: CPT | Performed by: INTERNAL MEDICINE

## 2021-12-15 PROCEDURE — 82784 ASSAY IGA/IGD/IGG/IGM EACH: CPT | Performed by: PHYSICIAN ASSISTANT

## 2021-12-15 PROCEDURE — 94640 AIRWAY INHALATION TREATMENT: CPT

## 2021-12-15 PROCEDURE — 250N000013 HC RX MED GY IP 250 OP 250 PS 637: Performed by: INTERNAL MEDICINE

## 2021-12-15 PROCEDURE — 85004 AUTOMATED DIFF WBC COUNT: CPT | Performed by: INTERNAL MEDICINE

## 2021-12-15 PROCEDURE — 87799 DETECT AGENT NOS DNA QUANT: CPT | Performed by: PHYSICIAN ASSISTANT

## 2021-12-15 PROCEDURE — 36415 COLL VENOUS BLD VENIPUNCTURE: CPT | Performed by: INTERNAL MEDICINE

## 2021-12-15 PROCEDURE — 250N000013 HC RX MED GY IP 250 OP 250 PS 637: Performed by: PHYSICAL MEDICINE & REHABILITATION

## 2021-12-15 PROCEDURE — 97110 THERAPEUTIC EXERCISES: CPT | Mod: GP

## 2021-12-15 PROCEDURE — 250N000009 HC RX 250: Performed by: INTERNAL MEDICINE

## 2021-12-15 PROCEDURE — 36415 COLL VENOUS BLD VENIPUNCTURE: CPT | Performed by: PHYSICIAN ASSISTANT

## 2021-12-15 PROCEDURE — 250N000009 HC RX 250: Performed by: PHYSICAL MEDICINE & REHABILITATION

## 2021-12-15 PROCEDURE — 94640 AIRWAY INHALATION TREATMENT: CPT | Mod: 76

## 2021-12-15 PROCEDURE — 250N000012 HC RX MED GY IP 250 OP 636 PS 637: Performed by: INTERNAL MEDICINE

## 2021-12-15 PROCEDURE — 99233 SBSQ HOSP IP/OBS HIGH 50: CPT | Mod: 24 | Performed by: CLINICAL NURSE SPECIALIST

## 2021-12-15 PROCEDURE — 272N000083 HC NUTRITION PRODUCT SEMIELEM INTERMED LITER

## 2021-12-15 PROCEDURE — 97535 SELF CARE MNGMENT TRAINING: CPT | Mod: GO

## 2021-12-15 PROCEDURE — 85610 PROTHROMBIN TIME: CPT | Performed by: INTERNAL MEDICINE

## 2021-12-15 RX ORDER — WARFARIN SODIUM 1 MG/1
2 TABLET ORAL
Status: COMPLETED | OUTPATIENT
Start: 2021-12-15 | End: 2021-12-15

## 2021-12-15 RX ADMIN — Medication 1 PACKET: at 21:24

## 2021-12-15 RX ADMIN — LEVALBUTEROL HYDROCHLORIDE 1.25 MG: 1.25 SOLUTION RESPIRATORY (INHALATION) at 08:07

## 2021-12-15 RX ADMIN — FLUCONAZOLE, SODIUM CHLORIDE 400 MG: 2 INJECTION INTRAVENOUS at 09:48

## 2021-12-15 RX ADMIN — ACETAMINOPHEN 975 MG: 325 TABLET, FILM COATED ORAL at 12:54

## 2021-12-15 RX ADMIN — AMLODIPINE BESYLATE 5 MG: 5 TABLET ORAL at 09:44

## 2021-12-15 RX ADMIN — Medication 400 MG: at 18:17

## 2021-12-15 RX ADMIN — PREDNISONE 10 MG: 10 TABLET ORAL at 21:21

## 2021-12-15 RX ADMIN — NYSTATIN 1000000 UNITS: 100000 SUSPENSION ORAL at 09:44

## 2021-12-15 RX ADMIN — PANTOPRAZOLE SODIUM 40 MG: 40 TABLET, DELAYED RELEASE ORAL at 09:44

## 2021-12-15 RX ADMIN — LEVOTHYROXINE SODIUM 25 MCG: 25 TABLET ORAL at 09:42

## 2021-12-15 RX ADMIN — NYSTATIN 1000000 UNITS: 100000 SUSPENSION ORAL at 15:58

## 2021-12-15 RX ADMIN — NYSTATIN 1000000 UNITS: 100000 SUSPENSION ORAL at 21:25

## 2021-12-15 RX ADMIN — CEFTAZIDIME 2 G: 2 INJECTION, POWDER, FOR SOLUTION INTRAVENOUS at 12:54

## 2021-12-15 RX ADMIN — CALCIUM CARBONATE 600 MG (1,500 MG)-VITAMIN D3 400 UNIT TABLET 1 TABLET: at 18:17

## 2021-12-15 RX ADMIN — ACETYLCYSTEINE 2 ML: 200 SOLUTION ORAL; RESPIRATORY (INHALATION) at 08:07

## 2021-12-15 RX ADMIN — INSULIN ASPART 4 UNITS: 100 INJECTION, SOLUTION INTRAVENOUS; SUBCUTANEOUS at 05:04

## 2021-12-15 RX ADMIN — FLUTICASONE PROPIONATE 1 SPRAY: 50 SPRAY, METERED NASAL at 09:50

## 2021-12-15 RX ADMIN — FUROSEMIDE 40 MG: 40 TABLET ORAL at 09:43

## 2021-12-15 RX ADMIN — ACETYLCYSTEINE 2 ML: 200 SOLUTION ORAL; RESPIRATORY (INHALATION) at 13:41

## 2021-12-15 RX ADMIN — ROSUVASTATIN CALCIUM 10 MG: 10 TABLET, FILM COATED ORAL at 09:45

## 2021-12-15 RX ADMIN — ESCITALOPRAM OXALATE 20 MG: 20 TABLET ORAL at 09:43

## 2021-12-15 RX ADMIN — Medication 800 MG: at 09:42

## 2021-12-15 RX ADMIN — CEFTAZIDIME 2 G: 2 INJECTION, POWDER, FOR SOLUTION INTRAVENOUS at 05:04

## 2021-12-15 RX ADMIN — Medication 1 TABLET: at 09:41

## 2021-12-15 RX ADMIN — WARFARIN SODIUM 2 MG: 1 TABLET ORAL at 18:17

## 2021-12-15 RX ADMIN — MYCOPHENOLATE MOFETIL 1000 MG: 250 CAPSULE ORAL at 09:41

## 2021-12-15 RX ADMIN — LEVALBUTEROL HYDROCHLORIDE 1.25 MG: 1.25 SOLUTION RESPIRATORY (INHALATION) at 19:44

## 2021-12-15 RX ADMIN — PANTOPRAZOLE SODIUM 40 MG: 40 TABLET, DELAYED RELEASE ORAL at 15:58

## 2021-12-15 RX ADMIN — CALCIUM CARBONATE 600 MG (1,500 MG)-VITAMIN D3 400 UNIT TABLET 1 TABLET: at 10:05

## 2021-12-15 RX ADMIN — MYCOPHENOLATE MOFETIL 1000 MG: 250 CAPSULE ORAL at 21:21

## 2021-12-15 RX ADMIN — Medication 1 PACKET: at 10:02

## 2021-12-15 RX ADMIN — ACETYLCYSTEINE 2 ML: 200 SOLUTION ORAL; RESPIRATORY (INHALATION) at 19:44

## 2021-12-15 RX ADMIN — PREDNISONE 10 MG: 10 TABLET ORAL at 09:44

## 2021-12-15 RX ADMIN — SULFAMETHOXAZOLE AND TRIMETHOPRIM 1 TABLET: 400; 80 TABLET ORAL at 09:42

## 2021-12-15 RX ADMIN — LEVALBUTEROL HYDROCHLORIDE 1.25 MG: 1.25 SOLUTION RESPIRATORY (INHALATION) at 13:41

## 2021-12-15 RX ADMIN — NYSTATIN 1000000 UNITS: 100000 SUSPENSION ORAL at 12:54

## 2021-12-15 RX ADMIN — MULTIPLE VITAMINS W/ MINERALS TAB 1 TABLET: TAB at 09:42

## 2021-12-15 RX ADMIN — CEFTAZIDIME 2 G: 2 INJECTION, POWDER, FOR SOLUTION INTRAVENOUS at 20:26

## 2021-12-15 RX ADMIN — TACROLIMUS 2.5 MG: 1 CAPSULE ORAL at 09:43

## 2021-12-15 RX ADMIN — TACROLIMUS 2 MG: 1 CAPSULE ORAL at 18:17

## 2021-12-15 RX ADMIN — QUETIAPINE FUMARATE 50 MG: 50 TABLET ORAL at 21:21

## 2021-12-15 ASSESSMENT — ACTIVITIES OF DAILY LIVING (ADL)
ADLS_ACUITY_SCORE: 25
ADLS_ACUITY_SCORE: 25
ADLS_ACUITY_SCORE: 23
ADLS_ACUITY_SCORE: 26
ADLS_ACUITY_SCORE: 23
ADLS_ACUITY_SCORE: 23
ADLS_ACUITY_SCORE: 25
ADLS_ACUITY_SCORE: 23
ADLS_ACUITY_SCORE: 25
ADLS_ACUITY_SCORE: 23
ADLS_ACUITY_SCORE: 25
ADLS_ACUITY_SCORE: 25
ADLS_ACUITY_SCORE: 23
ADLS_ACUITY_SCORE: 26
ADLS_ACUITY_SCORE: 26
ADLS_ACUITY_SCORE: 23
ADLS_ACUITY_SCORE: 25
ADLS_ACUITY_SCORE: 23

## 2021-12-15 NOTE — PLAN OF CARE
Denies pain, Alert and oriented x4. CGA 1 to ambulate to BR. Voided spontaneously on the toilet. Continent of BM. Independent with [place] cares. Poor appetite in  the morning. Did not order breakfast. Ordered lunch and ate 100%. Cab coverage insulin administered. Sliding scale insulin not given due to parameters. Tube feeding cycled irrigated with 400 ml x2, G-tube  site WDL. Dressing applied. PICC patent and IV antibiotic administered. Will continue POC.

## 2021-12-15 NOTE — PLAN OF CARE
"Discharge Planner Post-Acute Rehab SLP:     Discharge Plan: [facility] with jar[facility]     Precautions: aspiration precautions, sternal, fall    Current Status:  Communication: WFL. Mildly breathy, trach stoma covered.  Cognition: Will finish assessment 12/16. Overall noted deficits in attention and executive function, memory. Reports increased difficulty with ST memory   Swallow: Regular, thin liquid. Medications to be given orally with thin liquids.     Assessment: Pt seen at bedside for dysphagia assessment following prolonged hospitalization after lung transplant. Pt currently on regular texture, thin liquid diet. Pt seen with PO current diet. Pt with timely mastication, AP transit, and no oral residuals. Single instance poor coordination/delay swallow with larger cup sip thin liquid resulting in aspiration event. Wet vocal quality cleared with cue to cough/throat clear. Pt reported this happens \"from time to time\". Likely difficulty resulting from difficulty with coordination and breath support for timing of swallow. Further trials completed with 02 measured and pt 02 dropped from 97% to 94% with presentation of liquid. However, no other s/sx aspiration indicated with other PO trials. SLP provided education re: ensuring take small single sips at time. RECOMMEND: continue regular texture, thin liquid diet. SLP to continue to montior 02 levels and aspiration risk. If consistent, would likely trial thickened liquids/modify diet as indicated. Pt educated re: this and verbalized understanding.        Other Barriers to Discharge (Family Training, etc):     "

## 2021-12-15 NOTE — PROGRESS NOTES
IP Diabetes Management  Daily Note           Assessment and Plan:   HPI: Edson Thornton is a 56 year old male with a PMH significant for NSIP/ILD, bronchiectasis, moderate PH, RA, SALTY, chronic HSV infection, hypogammaglobulinemia, steroid-induced diabetes, hypothyroidism, PFO, HTN, HLD, duodenal anomaly, anxiety, and depression.  Admitted on 9/5/21 from OSH for acute on chronic respiratory failure 2/2 ILD exacerbation, now s/p BSLT on 10/16/21.    His hospitalization has been very prolonged and complicated, including bilateral multifocal embolic strokes, difficulty weaning status post trach (now decannulated) and PEG placement, recurrent GI bleed with progressive hypotension and CODE BLUE call requiring massive transfusion protocol with ulcer noted at the PEG bumper site, encephalopathy, atrial fibrillation with RVR, BRENNAN, Candida empyema and candidemia currently on IV fluconazole, HCAP on IV ceftazidime, hypogammaglobulin anemia status post IVIG, and right subclavian DVT.  Admitted to [facility]U on 12/13/21.  Endocrine called to assist with insulin and TF, anticipating transition to bolus TF in coming days.  Endocrine had signed off on 11/19/21 and recalled 12/15/21.    Assessment:   1)steroid induced Diabetes Mellitus, controlled (A1c 6.6 prior to transplant), with TF and steroid induced hyperglycemia  2) tube feeding induced hyperglycemia  3) S/P BSLT 10/16/21      Plan:    -NPH 30 units increase to 45 units tonight at start of TF (due to reduced glargine in circulation)   -reduced AM Lantus to 15 units   -discontinue PM Lantus                 -Novolog high resistance sliding scale Q 4 hours -- this will continue tonight, but then will remove Q4 overnight for pt sleep if it is safe   -start 1:15g CHO with meals, snacks,                  -BG monitoring Q 4 hours                 -hypoglycemia protocol                 -diabetes education needs will be assessed closer to discharge, when TF plan solidified                  -on discharge, will recommend outpatient follow up with [facility] service or endocrinology closer to home in [place], [place]      Plan discussed with pt and RN    Interval History and Assessment: interval glucose trend reviewed:     No significant hypoglycemia when TF off, likely because no CHO coverage ordered.  TF is Vital 1.5 at 80 ml/hour on 6p-6a to provide 187g CHO.      Redistributed insulin for cycle TF, using NPH, last night.  Note received correction at 0500 and then dropped signif following TF off at 0600.    May transiton to bolus feeds in coming days.  Will likely need some basal insulin (Lantus) on board as well as NPH to cover TF cycled on for 12 hours.  Once he switches to bolus feeds-will do fixed dose of novolog for that.      States has no appetite.  Never does.  But, trying to eat-- oreos, chips and sandwhich at bedside.          Current nutritional intake and type: Orders Placed This Encounter      2 Gram K Diet      Planned Procedures/surgeries: none  Steroid planning: pred 10 mg PO BID, taper    D5W-containing solutions/medications: medications as suspensions that likely contain glucose    PTA Diabetes Regimen:   Lantus 5 units daily  Novolog 1:15g CHO with meals  novolog 3-11 units with meals and bedtime, ?sliding scale insulin   BG monitor: One Touch Verio  Frequency of checks: unknown    Discharge Planning: TBD           Diabetes History:   Type of Diabetes: Steroid Induced Diabetes Mellitus, stress hyperglycemia  Lab Results   Component Value Date    A1C 5.3 11/15/2021    A1C 6.6 09/07/2021    A1C 6.5 09/05/2021              Review of Systems:     See interval hx           Medications:     Current Facility-Administered Medications   Medication     acetaminophen (TYLENOL) tablet 975 mg     acetylcysteine (MUCOMYST) 20 % nebulizer solution 2 mL     albuterol (PROVENTIL) neb solution 2.5 mg     amLODIPine (NORVASC) tablet 5 mg     bisacodyl (DULCOLAX) Suppository 10 mg     calcium  carbonate 600 mg-vitamin D 400 units (CALTRATE) per tablet 1 tablet     cefTAZidime (FORTAZ) 2 g vial to attach to  ml bag for ADULTS or NS 50 ml bag for PEDS     dextrose 10% infusion     glucose gel 15-30 g    Or     dextrose 50 % injection 25-50 mL    Or     glucagon injection 1 mg     escitalopram (LEXAPRO) tablet 20 mg     fiber modular (NUTRISOURCE FIBER) packet 1 packet     fluconazole (DIFLUCAN) intermittent infusion 400 mg in NaCl     fluticasone (FLONASE) 50 MCG/ACT spray 1 spray     folic acid-vit B6-vit B12 (FOLGARD) per tablet 1 tablet     furosemide (LASIX) tablet 40 mg     hydrOXYzine (ATARAX) tablet 25 mg     insulin aspart (NovoLOG) injection (RAPID ACTING)     insulin aspart (NovoLOG) injection (RAPID ACTING)     insulin aspart (NovoLOG) injection (RAPID ACTING)     insulin glargine (LANTUS PEN) injection 15 Units     insulin NPH injection 30 Units     levalbuterol (XOPENEX) neb solution 1.25 mg     levothyroxine (SYNTHROID/LEVOTHROID) tablet 25 mcg     Lidocaine (LIDOCARE) 4 % Patch 2 patch     lidocaine (viscous) (XYLOCAINE) 2 % solution 5 mL     lidocaine patch in PLACE     magnesium hydroxide (MILK OF MAGNESIA) suspension 30 mL     magnesium oxide (MAG-OX) tablet 400 mg     magnesium oxide (MAG-OX) tablet 800 mg     melatonin tablet 10 mg     menthol (ICY HOT) 5 % patch 1 patch     menthol (ICY HOT) Patch in Place     multivitamin w/minerals (THERA-VIT-M) tablet 1 tablet     mycophenolate (GENERIC EQUIVALENT) capsule 1,000 mg     naloxone (NARCAN) injection 0.2 mg    Or     naloxone (NARCAN) injection 0.4 mg    Or     naloxone (NARCAN) injection 0.2 mg    Or     naloxone (NARCAN) injection 0.4 mg     nystatin (MYCOSTATIN) suspension 1,000,000 Units     ondansetron (ZOFRAN-ODT) ODT tab 4 mg     oxyCODONE (ROXICODONE) tablet 5-10 mg     pantoprazole (PROTONIX) EC tablet 40 mg     Patient is already receiving anticoagulation with heparin, enoxaparin (LOVENOX), warfarin (COUMADIN)  or other  "anticoagulant medication     predniSONE (DELTASONE) tablet 10 mg     prochlorperazine (COMPAZINE) tablet 10 mg     QUEtiapine (SEROquel) tablet 25 mg     QUEtiapine (SEROquel) tablet 50 mg     rosuvastatin (CRESTOR) tablet 10 mg     senna-docusate (SENOKOT-S/PERICOLACE) 8.6-50 MG per tablet 1 tablet     sodium chloride (PF) 0.9% PF flush 10-20 mL     sodium chloride (PF) 0.9% PF flush 10-40 mL     sulfamethoxazole-trimethoprim (BACTRIM) 400-80 MG per tablet 1 tablet     tacrolimus (GENERIC EQUIVALENT) capsule 2 mg     tacrolimus (GENERIC EQUIVALENT) capsule 2.5 mg     warfarin ANTICOAGULANT (COUMADIN) tablet 2 mg     Warfarin Therapy Reminder (Check START DATE - warfarin may be starting in the FUTURE)     zinc-white petrolatum (ILEX) 58.3 % paste 1 g            Physical Exam:    /87 (BP Location: Right arm)   Pulse 83   Temp 98  F (36.7  C) (Oral)   Resp 16   Ht 1.646 m (5' 4.8\")   Wt 67.9 kg (149 lb 12.8 oz)   SpO2 96%   BMI 25.08 kg/m    Gen: resting in bed, NAD  Neck: trach site covered  Resp: unlabored , no cough  Mental status: alert and oriented, communicating clearly  Pysch: calm, slightly flat affect            Data:     Recent Labs   Lab 12/15/21  1137 12/15/21  0739 12/15/21  0459 12/14/21  2346 12/14/21  1950 12/14/21  1706   * 86 231* 139* 139* 82     Lab Results   Component Value Date    WBC 11.0 12/15/2021    WBC 13.2 (H) 12/14/2021    WBC 9.7 12/12/2021    HGB 10.2 (L) 12/15/2021    HGB 11.5 (L) 12/14/2021    HGB 9.8 (L) 12/12/2021    HCT 31.7 (L) 12/15/2021    HCT 36.2 (L) 12/14/2021    HCT 30.6 (L) 12/12/2021    MCV 90 12/15/2021    MCV 90 12/14/2021    MCV 93 12/12/2021     12/15/2021     12/14/2021     (L) 12/12/2021     Lab Results   Component Value Date     12/14/2021     12/12/2021     12/10/2021    POTASSIUM 4.4 12/15/2021    POTASSIUM 5.0 12/14/2021    POTASSIUM 4.5 12/12/2021    CHLORIDE 101 12/14/2021    CHLORIDE 108 12/12/2021    " CHLORIDE 103 12/10/2021    CO2 25 12/14/2021    CO2 26 12/12/2021    CO2 27 12/10/2021     (H) 12/15/2021    GLC 86 12/15/2021     (H) 12/15/2021     Lab Results   Component Value Date    BUN 33 (H) 12/14/2021    BUN 34 (H) 12/12/2021    BUN 30 12/10/2021     Lab Results   Component Value Date    TSH 3.17 11/19/2021    TSH 7.90 (H) 10/29/2021    TSH 0.11 (L) 09/07/2021     Lab Results   Component Value Date    AST 17 12/12/2021    AST 19 12/10/2021    AST 17 12/08/2021    ALT 26 12/12/2021    ALT 24 12/10/2021    ALT 25 12/08/2021    ALKPHOS 104 12/12/2021    ALKPHOS 105 12/10/2021    ALKPHOS 107 12/08/2021           I spent a total of 35 minutes bedside and on the inpatient unit managing the glycemic care of Edson Thornton. Over 50% of my time on the unit was spent counseling the patient  and/or coordinating care regarding steroid, TF exacerbated diabetes.  See note for details.  Tammie Shamir APRN -[id number]      To contact Endocrine Diabetes service:   From 8AM-4PM: page inpatient diabetes provider that is following the patient  For questions or updates from 4PM-8AM: page the diabetes job code for on call fellow: [phone]

## 2021-12-15 NOTE — PLAN OF CARE
Discharge Planner Post-Acute Rehab OT:     Discharge Plan: to [facility] with fiance    Precautions: sternal, fall    Current Status:  ADLs:    Mobility: CGA for in room mobility using walker    Grooming: CGA standing at the sink, chair behind for intermittent rest breaks    Dressing: TBD UB dsg (unable d/t nsg request leave IV running on 12/15), Mod A LB dsg    Bathing: Min A w/shower chair    Toileting: CGA for transfer using walker  IADLs: fiance will assist  Vision/Cognition: L visual deficit at baseline and new right eye blind spot. Pt scored 24/3 on the SLUMS indicating mild deficit    Assessment: Shower assessment completed, pt Min A to wash below knees d/t level of fatigue. Focused on pacing and energy conservation. Increasing fatigue with shower resulted in increasing assist for dressing after, functional levels noted above. Cont per pPOC    Other Barriers to Discharge (DME, Family Training, etc): poor activity endurance

## 2021-12-15 NOTE — PROGRESS NOTES
Calorie Count:  12/14: 279 kcal and 21 g protein (1 meals - pt only ordered dinner meal, 0 supplements)    Zenaida Washington RD, CNSC, LD  [facility] RD pager: [phone]  Weekend/holiday pager: [phone]

## 2021-12-15 NOTE — PROGRESS NOTES
"   12/15/21 0900   General Information   Onset of Illness/Injury or Date of Surgery 09/05/21   Referring Physician  Johnny Coronado MD   Pertinent History of Current Problem per H&P:\"56-year-old gentleman with a past medical history of RA, interstitial lung disease, sleep apnea, hypertension, hyperlipidemia, hypogammaglobulinemia, anxiety, and depression who was transferred to [facility] from an outside hospital on 9/5/2021 with worsening respiratory failure and evaluation of lung transplant.  \"   General Observations Pt seen by SLP during prolonged hospitalization for dysphagia. VFSS completed 11/26 recommending regular texture, thin liquid. Last seen by SLP 12/1 with trach site capped and noted tolerating current diet. Cracked tooth on regular texture and was changed to IDD 7. Pt reported disinterest in softer foods and was switched back. SLP to determine if appropriate for regular texture.   Past History of Dysphagia seen by SLP for dysphagia in hospital otherwise no previous hx dysphagia   Disability/Function   Wear Glasses or Blind yes   Vision Management glasses   Concentrating, Remembering or Making Decisions Difficulty yes   Concentration Management st memory   Pain Assessment   Patient Currently in Pain No   Type of Evaluation   Type of Evaluation Swallow Evaluation   Oral Motor   Oral Musculature generally intact   Dentition (Oral Motor)   Dentition (Oral Motor) natural dentition   Facial Symmetry (Oral Motor)   Facial Symmetry (Oral Motor) WNL   Lip Function (Oral Motor)   Lip Range of Motion (Oral Motor) WNL   Lip Strength (Oral Motor) WNL   Lip Sensitivity (Oral Motor) intact   Tongue Function (Oral Motor)   Tongue ROM (Oral Motor) elevation is impaired   Elevation, Tongue ROM Impairment (Oral Motor) bilateral   Tongue Strength (Oral Motor) strength decreased   Jaw Function (Oral Motor)   Jaw Function (Oral Motor) WNL   Cough/Swallow/Gag Reflex (Oral Motor)   Soft Palate/Velum (Oral Motor) WNL "   Volitional Throat Clear/Cough (Oral Motor) WNL   Volitional Swallow (Oral Motor) WNL   Vocal Quality/Secretion Management (Oral Motor)   Vocal Quality (Oral Motor) breathy   Secretion Management (Oral Motor) WNL   General Swallowing Observations   Current Diet/Method of Nutritional Intake (General Swallowing Observations, NIS) regular diet;thin liquids (level 0)   Respiratory Support (General Swallowing Observations) none   Swallowing Evaluation Clinical swallow evaluation   Clinical Swallow Evaluation   Feeding Assistance no assistance needed   Additional evaluation(s) completed today Yes;Recommended   Rationale for completing additional evaluation cognitive impairment   Clinical Swallow Evaluation Textures Trialed thin liquids;solid foods   Clinical Swallow Eval: Thin Liquid Texture Trial   Mode of Presentation, Thin Liquids self-fed;straw;cup   Volume of Liquid or Food Presented 6 oz   Oral Phase of Swallow WFL   Pharyngeal Phase of Swallow impaired   Diagnostic Statement single instance delayed coordination of swallowing followed by wet vocal quality and resolving with cued cough/throat clear   Clinical Swallow Evaluation: Solid Food Texture Trial   Mode of Presentation self-fed   Volume Presented 1 cracker   Oral Phase WFL   Pharyngeal Phase intact   Esophageal Phase of Swallow   Patient reports or presents with symptoms of esophageal dysphagia No   Swallowing Recommendations   Diet Consistency Recommendations regular diet;thin liquids (level 0)   Mode of Delivery Recommendations bolus size, small   Swallowing Maneuver Recommendations alternate food and liquid intake   Monitoring/Assistance Required (Eating/Swallowing) stop eating activities when fatigue is present;monitor for cough or change in vocal quality with intake   Recommended Feeding/Eating Techniques (Swallow Eval) maintain upright sitting position for eating   Medication Administration Recommendations, Swallowing (SLP) whole, thin   General Therapy  "Interventions   Planned Therapy Interventions Dysphagia Treatment   Dysphagia treatment Instruction of safe swallow strategies   SLP Therapy Assessment/Plan   Criteria for Skilled Therapeutic Interventions Met (SLP Eval) yes   SLP Diagnosis mild pharyngeal dysphagia   Rehab Potential (SLP Eval) good, to achieve stated therapy goals   Therapy Frequency (SLP Eval) daily   Predicted Duration of Therapy Intervention (SLP Eval) 3 weeks   Comment, Therapy Assessment/Plan (SLP) Pt seen at bedside for dysphagia assessment following prolonged hospitalization after lung transplant. Pt currently on regular texture, thin liquid diet. Pt seen with PO current diet. Pt with timely mastication, AP transit, and no oral residuals. Single instance poor coordination/delay swallow with larger cup sip thin liquid resulting in aspiration event. Wet vocal quality cleared with cue to cough/throat clear. Pt reported this happens \"from time to time\". Likely difficulty resulting from difficulty with coordination and breath support for timing of swallow. Further trials completed with 02 measured and pt 02 dropped from 97% to 94% with presentation of liquid. However, no other s/sx aspiration indicated with other PO trials. SLP provided education re: ensuring take small single sips at time. RECOMMEND: continue regular texture, thin liquid diet. SLP to continue to montior 02 levels and aspiration risk. If consistent, would likely trial thickened liquids/modify diet as indicated. Pt educated re: this and verbalized understanding.   Therapy Plan Review/Discharge Plan (SLP)   Therapy Plan Review (SLP) evaluation/treatment results reviewed;care plan/treatment goals reviewed;risks/benefits reviewed;participants voiced agreement with care plan    Total Evaluation Time   Total Evaluation Time (Minutes) 60  (dysphagia)     "

## 2021-12-15 NOTE — CONSULTS
Social Work: Initial Assessment with Discharge Plan    Patient Name: Edson Thornton  : 1965  Age: 56 year old  MRN: [id number]  Completed assessment with: Chart review and interview with patient   Admitted to ARU: 2021    Presenting Information   Date of SW assessment: December 15, 2021  Health Care Directive: Copy in Chart (POA completed as well)   Primary Health Care Agent: Patient/self   Secondary Health Care Agent: Spouse/firoel Peg   Living Situation: Lives in [facility] in a house with spouse. 2 PILO and 12 STI to bedroom. Walk in shower, shower chair, standard toilet.   Previous Functional Status: Per pt chart, pt was starting to get more assistance from spouse with ADLs. Spouse completes most/all IADLs and household chores. On O2 PTA. Was working as a .   DME available: Shower chair, home O2   Patient and family understanding of hospitalization: Appropriate, A&Ox4.   Cultural/Language/Spiritual Considerations: 55 y/o male, english-speaking, Yazidism-florecita.     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  Postoperative course was initially complicated by prolonged vent wean s/p trach and PEG placement with CT surgery on 10/29.  Patient developed significant encephalopathy and diffuse bilateral upper extremity weakness, found to have multifocal acute to subacute CVA of bilateral frontal and occipital lobes, thought to be embolic versus perioperative in nature.  Hospitalization was further  complicated by new Afib with RVR, BRENNAN, candidemia with Candida empyema currently on IV fluconazole, hospital-acquired Klebsiella/Pseudomonas pneumonia currently on IV ceftazidime CODE BLUE associated with bradycardia/asystole and significant GI bleed requiring massive transfusion protocol, s/p EGD and clipping with adherent clot near PEG site.  Patient was transferred from ICU to medicine on 11/23/2021 and decannulated 12/8/2021.  During hospitalization, rheumatology, CT surgery, cardiology, palliative care, dental, ID, pulmonology, neurology, GI, ophthalmology, ENT, nephrology, endocrine, psychiatry all consulted.    Provider Information   Primary Care Physician:Lia Spicer--Not listed on face sheet   : [facility]      Mental Health/Chemical Dependency:   Diagnosis: Anxiety reported. Per report from bedside RN, difficulty coping/adjusting to long medical course and long hospitalization. Eager to discharge.   Alcohol/Tobacco/Narcotis: None reported  Support/Services in Place: Per previous SW note, pt has been attending weekly online support groups for lung transplant patients.   Services Needed/Recommended: Supportive services available by consult (Health Psychology and Alondra services)   Sexuality/Intimacy: Not discussed     Support System  Marital Status: -doesn't work, can and will be caregiver at D/C  Family support: Zenia, step daughter (also listed on health care directive) and pt's dad (lives in [place]) and stepmom.   Other support available: See above     Community Resources  Current in home services: None reported   Previous services: None reported     Financial/Employment/Education  Employment Status: Worked as a  PTA. Served in the [employer].    Income Source: Salary/wages and spouse income (social security income)  Education: 12th grade   Financial Concerns:  None reported or identified per chart review   Insurance: BCBS of [place] --Jenn insurance CM  contact number #[phone], ex [phone]    Discharge Plan   Patient and family discharge goal: [place] with spouse. HC vs OP pending progress. RN CC following and will assist with coordinating.   Provided Education on discharge plan: Yes  Patient agreeable to discharge plan:  Yes  Provided education and attained signature for Medicare IM and IRF Patient Rights and Privacy Information provided to patient : N/A  Provided patient with [place] [facility] [place] Resources: YES  Barriers to discharge: None identified     Discharge Recommendations   Disposition: See above   Transportation Needs: Family assistance   Name of Transportation Company and Phone: N/A     Additional comments   Discharge 12/30 pending progress. RN CC will coordinate discharge needs. This writer available if additional needs arise. No questions or concerns reported at this time.     Please invite to Care Conference:  N/A     [place], [facility]  [place] [facility]   Phone: [phone]  I   Pager: [phone]

## 2021-12-15 NOTE — PLAN OF CARE
Care Coordination:    Writer met with patient and spouse to introduce self and role of care coordination. Patient's tentative discharge date is 12/30. Patient's spouse has been staying at [facility] since 11/19 and that is where patient plans to discharge. Currently, patient is requiring tube feeding via peg tube. Patient states that the Dietician met with him and his goal is to wean off of tube feeding by discharge. Writer did send a benefit check to [facility] just in case. Patient states that he does have a CPAP at home, but it is old. He states he did have a new CPAP, but [employer] did remove it from his home. Writer to follow up on this. Patient does not have a nebulizer at home in the case that he is discharged with nebulizer treatments. Patient states that he is not new to Coumadin and he and spouse do not believe they need a PLC education class. Writer informed patient and spouse that they will receive general transplant education, as well as pharmacy education for transplant medications. Writer did update transplant coordinator and PharmD about patient's discharge date. Writer provided patient and spouse with contact information for any questions or concerns. Writer will continue to assist with coordination of care and discharge planning.     Carolyne Adams, RN, BSN, CRRN  Patient Care Management Coordinator  Acute Rehabilitation Unit/Transitional Care Unit  PH: [phone]  Pager: [phone]

## 2021-12-15 NOTE — PROGRESS NOTES
"[facility], [place]   Internal Medicine Daily Note           Interval History/Events     Overnight events reviewed  Reports doing well  No nausea, vomiting, chest pain, shortness of breath  No lightheadedness or dizziness.        Review of Systems        4 point ROS including Respiratory, CV, GI and , other than that noted above is negative      Medications   I have reviewed current medications  in the \"current medication\" section of Epic.  Relevant changes include:     Physical Exam   General:       Vital signs:    Blood pressure 131/87, pulse 83, temperature 98  F (36.7  C), temperature source Oral, resp. rate 16, height 1.646 m (5' 4.8\"), weight 67.9 kg (149 lb 12.8 oz), SpO2 95 %.  Estimated body mass index is 25.08 kg/m  as calculated from the following:    Height as of this encounter: 1.646 m (5' 4.8\").    Weight as of this encounter: 67.9 kg (149 lb 12.8 oz).      Intake/Output Summary (Last 24 hours) at 12/14/2021 1525  Last data filed at 12/14/2021 1346  Gross per 24 hour   Intake 1290 ml   Output 1655 ml   Net -365 ml        Constitutional: Sitting on chair in no acute distress  Eye: No icterus, no pallor  Mouth/ENT: Normal oral mucosa  Cardiovascular: S1, S2 normal.   Respiratory: B/L CTA  GI: Soft, NT, BS+  :   Neurology: Alert, awake, and oriented. No focal neuro deficit.   Psych:   MSK:   Integumentary:   Heme/Lymph/Imm:      Laboratory and Imaging Studies     I have reviewed  laboratory and imaging studies in the Epic. Pertinent findings are as below:    [facility]  Recent Labs   Lab 12/15/21  1139 12/15/21  1137 12/15/21  0739 12/15/21  0459 12/14/21  2346 12/14/21  1142 12/14/21  1106 12/12/21  0933 12/12/21  0519 12/11/21  0819 12/11/21  0649 12/10/21  0823 12/10/21  0647 12/09/21  1227 12/09/21  0641   NA  --   --   --   --   --   --  135  --  141  --   --   --  138  --  138   POTASSIUM 4.4  --   --   --   --   --  5.0  --  4.5  --  4.9  --  4.7  --  4.7   CHLORIDE  --   " --   --   --   --   --  101  --  108  --   --   --  103  --  105   ERIK  --   --   --   --   --   --  9.9  --  8.6  --   --   --  9.0  --  9.1   CO2  --   --   --   --   --   --  25  --  26  --   --   --  27  --  28   BUN  --   --   --   --   --   --  33*  --  34*  --   --   --  30  --  28   CR  --   --   --   --   --   --  0.82  --  1.00  --   --   --  0.69  --  0.64*   GLC  --  104* 86 231* 139*   < > 115*   < > 167*   < >  --    < > 203*   < > 153*    < > = values in this interval not displayed.     CBC  Recent Labs   Lab 12/15/21  0647 12/14/21  1106 12/12/21  0519 12/10/21  0647   WBC 11.0 13.2* 9.7 9.0   RBC 3.52* 4.01* 3.30* 3.15*   HGB 10.2* 11.5* 9.8* 9.3*   HCT 31.7* 36.2* 30.6* 29.2*   MCV 90 90 93 93   MCH 29.0 28.7 29.7 29.5   MCHC 32.2 31.8 32.0 31.8   RDW 14.2 13.8 14.2 14.3    218 144* 141*     INR  Recent Labs   Lab 12/15/21  0647 12/14/21  1106 12/13/21  0539 12/12/21  0519   INR 2.41* 2.25* 2.57* 2.52*     LFTs  Recent Labs   Lab 12/12/21  0519 12/10/21  0647   ALKPHOS 104 105   AST 17 19   ALT 26 24   BILITOTAL 0.2 0.5   PROTTOTAL 5.8* 5.9*   ALBUMIN 2.5* 2.5*      PANCNo lab results found in last 7 days.        Impression/Plan      57 yo M with PMHx of NSIP/ILD 2/2 Rheumatoid lung disease, rheumatoid arthritis, bronchiectasis, pulmonary HTN, SALTY, essential HTN, HLD, PLD, hypogammaglobinemia, duodenal anomaly, anxiety, and depression is being admitted to  ARU on 12/13/2021 for ongoing rehabilitation after hospitalization at [facility] from 09/05/2021 to 12/13/2021.  Patient was initially admitted to the hospital on 9/5/2021 with acute on chronic hypoxemic respiratory failure for transplant work-up.  He underwent bilateral lung transplant on 10/16/2021, with post operative course complicated by prolonged ventilator wean s/p trach and PEG/J tube placement with thoracic surgery on 10/29. Post-operative course complicated encephalopathy, subacute CVA, afib with RVR, BRENNAN,  candidemia/candida empyema, and brief bradycardic arrest and subsequent hypotension in the setting of gastrointestinal blood loss.  His tracheostomy was decannulated on 12/8/2021. He currently is stable off of oxygen. He is feeding through PEG tube.      # ILD and NSIP s/p BSLT on 10/16/2021  # Acute on chronic hypoxic respiratory failure s/p tracheostomy on 10/29/21 and decannulation on 12/8/2021  # Bilateral pleural effusions, improved  * Transplant pulmonology consultation appreciated  * Immunosuppression: s/p basiliximab on 10/16 and 10/20.On tacrolimus, MMF, prednisone per transplant pulmonary medicine recommendations  * Prophylaxis: On bactrim 400-80 mg daily, nystatin QID x6 months  K level 4.4, White count normal.     - Continue immunosuppression and prophylaxis as above  - DSA every 2 weeks   - Levalbuterol and mucomyst QID and pulm toilet and chest PT QID  - PT/OT/SLP   -Continue 40 mg of Lasix daily  - Oxycodone 5-10 mg Q4H prn, would aggressively taper this over the course of the next week to 2 weeks  - Schedule tylenol  - Tacrolimus level on 12/16  - CBC, BMP weekly      # Left lung cavitary lesion   Suspect aspiration vs pseudomonal vs Klebsiella pneumonia related abscess. CT on 10/25 with LLL nodular opacity. Repeat CT chest on 11/22 with new cavitary lesion in the left lung base, and with multifocal bilateral upper lobe GGO (some of which are new), new 1.8 cm fluid collection with surrounding fat stranding in the left axilla, decreased bilateral loculated pleural effusions. Indeterminate Quant Gold, but negative tuberculin skin tests on mulitple occurences. S/p BAL on 11/22. ID feels less likely fungal. BD glucan positive but has known candidemia. Cocci antigen in urine negative, serum histoplasmosis negative, CRAG negative.  Appreciate transplant infectious disease consultation  - IV Ceftazdime 2 grams Q8H (until 12/21)  - Follow up CT chest in 4 weeks on 12/21  - Please include in the patient's  discharge instructions, follow up with Dr. Abebe on 12/21/2021 @6:30 PM in a virtual visit.      # Klebsiella pneumoniae and Pseudomonas fluorescens/putida HAP  Klebsiella initially noted trach sputum culture 11/10. Bronch culture 11/12 with Klebsiella and Pseudomonas fluorescens/putida (R-meropenem).     - Treat as above      # Invasive candidiasis with Candida albicans  Positive candida BCx 10/20 and 10/22.  BDG fungitell positive (399) on 10/20. Sputum Cx + Candida albicans persistently.  TC 10/23 without evidence of endocarditis. Ophthalmology consult 10/24 with benign dilated fundoscopic exam.  Candida empyema also noted 10/25, chest tubes inadvertently removed by CVTS 10/28, left chest tube replaced by IR 11/3 as above with ongoing Candida on cultures. Repeat pleural fungal cultures and fungal/bacterial blood cultures on 11/18 NGTD.   * No longer has any chest tubes in place  * Initially on Micafungin (10/22-10/27) transition to fluconazole 400 mg IV daily (start date 10/26 end date 12/21)  - Continue Fluconazole 400 mg IV daily     # Hypogammaglobinemia  Received IVIG with plan to repeat IgG on 12/15.    # Encephalopathy, resolved  # Difficulty sleeping  Likely multi-factorial in the setting of stroke, prolonged critical illness.  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 MRI brain on 10/23 with multifocal subacute infarct within both cerebral hemispheres and left cerebellum. Presumed embolic with afib hx. He is currently able to ambulate with relative ease.  - Anticoagulation as noted below.     # Atrial fibrillation  [id number] 4 for HTN, CVA, and DM2. First noted on 10/18, started on amiodarone drip, and converted to NSR. Metoprolol and amidoarone discontinued on 11/20 due to intermittent bradycardia.    - Anticoagulation with warfarin as noted below.  - Continue Rosuvastatin 10 mg daily.     # Right subclavian DVT   Diagnosed on 11/4 on ultrasound.  - Continue warfarin per pharmacy protocol     # DM2 with steroid induced hyperglycemia  Hemoglobin A1c 6.6 pre-operatively. Endocrine recently consulted for steroids induced hyperglycemia.   - Continue Lantus 30 mg BID  - Novolog High Sliding scale insulin Q4H; once patient is off tube feeds could transition to 4 times daily with meals.  -Blood glucose Q4H; once patient is off tube feeds could transition to 4 times daily with meals.     # Severe malnutrition in the context of acute on chronic illness  Patient currently has a PEG J-tube and is getting the Jorde of nutrition through cycle tube feeds.  -Multivitamin, folic acid, B6, B12 supplements   - TF per nutrition; continue oral diet as well  -Hopefully over the next couple of weeks we can decrease tube feeds and allow the patient to eat more food by mouth.  Currently on cycle tube feeds.     # Anxiety and depression   Psychiatry reconsulted and after discussion suggest increasing lexapro. Will discuss with patient regarding starting ritalin in the AM for motivation.  - Lexapro increased to 10 mg daily could increase to 20 in a week ~12/12.  -As needed hydroxyzine.     # Concern for chipped tooth  # Poor dental hygiene  Patient noted he feels like he may have chipped his tooth or has a loose tooth after eating guevara. It is not painful.   - Dental hygiene consult placed and chipped tooth stable rec oral hygiene cares.     # Rheumatoid arthritis- Dx 5/2021 with + CCP antibody and RF. Previously treated with rituximab. Rheum consulted early in admission. On steroids as noted above.   # SALTY - Uses CPAP at bedtime prior to admission.  # HTN- Continue PTA amlodipine.   # Hypothyroidism - TSH 3.17 on 11/19/21. Continue PTA levothyroxine 25 mcg daily.     # Recurrent GIB - hgb drop on 10/22 s/p 2 unit pRBC transfusion with  EGD on 10/23 with NJ/OG tube trauma with scant oozing. Patient then developed progressive hypotension and ultimately CODE BLUE for GIB in setting of anticoagulation with heparin drip, s/p massive transfusion protocol. EGD on 11/2 with large amount of clotted blood in stomach and area of raised mucosa with small adherent clot near PEG tube site that was clipped, no active bleeding.  Maroon stools 11/3, repeat EGD with extensive old blood in stomach, no active bleeding, small nodular area with prior clips clipped again.  Most recent EGD 11/5 with ulcer noted at PEG tube bumper site, gastritis, and suction marks from G tube. TF noted in G tube drainage bag 11/7, AXR with GJ tube tip projecting over proximal duodenum. GJ tube exchanged 11/9 by GI.  - PO PPI BID      # HSV  Chronic intermittent active infection pre-transplant with recent HSV infection: crusted lesions throughout left side of jaw, s/p 10 day treatment course of ACV through 10/9.  HSV PCR blood negative 10/17.  S/p ACV ppx as above (started POD #1 instead of POD #8 given HSV history and location).     # Diet:  Tube feeds and regular diet  # DVT Prophylaxis: Heparin SQ  # Watson Catheter: Not present  # Central Lines: None  # Code Status: Full Code    Pt's care was discussed with bedside RN, patient and  during Care Team Rounds.               Maury Lucero MD  Hospitalist ( Internal medicine)  Pager: [phone]

## 2021-12-15 NOTE — PROGRESS NOTES
[facility]   Acute Rehabilitation Unit  Daily progress note    INTERVAL HISTORY  No acute events overnight.  Cares were more clustered and he was less upset overnight, however reports still not sleeping well.  This morning has no new complaints.  Has L sided precordial pain at the site of his prior chest tube, and continued dyspnea with exertion.  Gets tachycardic with physical activity.  Denies any problems with bowel or bladder but appetite remains poor.  Functionally he is CGA for grooming, Mod A LBD, Min A bathing, ambualting 15 ft with a WW, transfers with CGA, and bed mobility with Min A.      MEDICATIONS  Scheduled meds    acetaminophen  975 mg Oral or Feeding Tube Q8H     acetylcysteine  2 mL Nebulization TID     amLODIPine  5 mg Oral Daily     calcium carbonate 600 mg-vitamin D 400 units  1 tablet Oral or Feeding Tube BID w/meals     cefTAZidime  2 g Intravenous Q8H     escitalopram  20 mg Oral or Feeding Tube Daily     fiber modular (NUTRISOURCE FIBER)  1 packet Per Feeding Tube BID     fluconazole  400 mg Intravenous Q24H     fluticasone  1 spray Both Nostrils Daily     folic acid-vit B6-vit B12  1 tablet Oral Daily     furosemide  40 mg Oral Daily     insulin aspart  1-12 Units Subcutaneous 5 times per day     insulin aspart   Subcutaneous TID w/meals     insulin glargine  15 Units Subcutaneous QAM AC     insulin NPH  45 Units Subcutaneous Q24H     levalbuterol  1.25 mg Nebulization TID     levothyroxine  25 mcg Oral Daily     Lidocaine  2 patch Transdermal Q24H     lidocaine   Transdermal Q8H     magnesium oxide  400 mg Oral QPM     magnesium oxide  800 mg Oral QAM     multivitamin w/minerals  1 tablet Oral Daily     mycophenolate  1,000 mg Oral BID     nystatin  1,000,000 Units Swish & Swallow 4x Daily     pantoprazole  40 mg Oral BID AC     predniSONE  10 mg Oral or Feeding Tube BID     QUEtiapine  50 mg Oral At Bedtime     rosuvastatin  10 mg Oral or Feeding  "Tube Daily     sodium chloride (PF)  10-40 mL Intracatheter Q8H     sulfamethoxazole-trimethoprim  1 tablet Oral or Feeding Tube Daily     tacrolimus  2 mg Oral QPM     tacrolimus  2.5 mg Oral QAM     warfarin ANTICOAGULANT  2 mg Oral ONCE at 18:00       PRN meds:  albuterol, bisacodyl, dextrose, glucose **OR** dextrose **OR** glucagon, hydrOXYzine, insulin aspart, lidocaine (viscous), magnesium hydroxide, melatonin, menthol, menthol, naloxone **OR** naloxone **OR** naloxone **OR** naloxone, ondansetron, oxyCODONE, - MEDICATION INSTRUCTIONS -, prochlorperazine, QUEtiapine, senna-docusate, sodium chloride (PF), Warfarin Therapy Reminder, zinc-white petrolatum      PHYSICAL EXAM  /79 (BP Location: Right arm)   Pulse 111   Temp 97.3  F (36.3  C) (Oral)   Resp 20   Ht 1.646 m (5' 4.8\")   Wt 67.9 kg (149 lb 12.8 oz)   SpO2 94%   BMI 25.08 kg/m    Gen: Awake, coooperative  HEENT: atraumatic, no discharge from nares or ears, EOM intact  Cardio: Tachycardic, no murmur auscultated  Pulm: Non-labored breathing, CTAB with diminished sounds at R lung base  Abd: Soft, non-tender to palpation, bowel sounds present. PEG c/d/i  Ext: No edema in lower or upper extremities b/l, no calf tenderness  Neuro/MSK: alert, oriented, no new slurring of words, answers questions appropriately, follows commands      LABS  Recent Labs   Lab 12/15/21  1139 12/15/21  1137 12/15/21  0739 12/15/21  0647 12/15/21  0459 12/14/21  1142 12/14/21  1106 12/13/21  0843 12/13/21  0539 12/12/21  0933 12/12/21  0519 12/10/21  0823 12/10/21  0647   WBC  --   --   --  11.0  --   --  13.2*  --   --   --  9.7  --  9.0   HGB  --   --   --  10.2*  --   --  11.5*  --   --   --  9.8*  --  9.3*   MCV  --   --   --  90  --   --  90  --   --   --  93  --  93   PLT  --   --   --  193  --   --  218  --   --   --  144*  --  141*   INR  --   --   --  2.41*  --   --  2.25*  --  2.57*  --  2.52*   < > 2.18*   NA  --   --   --   --   --   --  135  --   --   --  141 "  --  138   POTASSIUM 4.4  --   --   --   --   --  5.0  --   --   --  4.5   < > 4.7   CHLORIDE  --   --   --   --   --   --  101  --   --   --  108  --  103   CO2  --   --   --   --   --   --  25  --   --   --  26  --  27   BUN  --   --   --   --   --   --  33*  --   --   --  34*  --  30   CR  --   --   --   --   --   --  0.82  --   --   --  1.00  --  0.69   ANIONGAP  --   --   --   --   --   --  9  --   --   --  7  --  8   ERIK  --   --   --   --   --   --  9.9  --   --   --  8.6  --  9.0   GLC  --  104* 86  --  231*   < > 115*   < >  --    < > 167*   < > 203*   ALBUMIN  --   --   --   --   --   --   --   --   --   --  2.5*  --  2.5*   PROTTOTAL  --   --   --   --   --   --   --   --   --   --  5.8*  --  5.9*   BILITOTAL  --   --   --   --   --   --   --   --   --   --  0.2  --  0.5   ALKPHOS  --   --   --   --   --   --   --   --   --   --  104  --  105   ALT  --   --   --   --   --   --   --   --   --   --  26  --  24   AST  --   --   --   --   --   --   --   --   --   --  17  --  19    < > = values in this interval not displayed.     No results found for this or any previous visit (from the past 24 hour(s)).    ASSESSMENT AND PLAN    Edson Thornton is a 56 year old right hand dominant male with a hx of NSIP/ILD associated with rheumatoid lung disease, hypertension, hyperlipidemia, RA who initially presented 9/5/2021 with acute on chronic respiratory failure for transplant work-up s/p bilateral lung transplant (10/16/21) with prolonged hospitalization associated with multifocal bilateral acute/subacute ischemic infarcts of bilateral frontal, occipital lobes.  Postoperative course complicated by prolonged ventilator wean s/p trach and PEG placement (10/29), encephalopathy, new Afib with RVR, BRENNAN, candidemia/Candida empyema, and brief bradycardic arrest with subsequent hypotension in the setting of GI bleed.  [facility] and was decannulated 12/8/2021.  He is currently  stable on room air at admission to acute rehab.  He presents with right upper extremity weakness, bilateral dysmetria/tremors, acute on chronic severe deconditioning, dyspnea on exertion, and impairment of ADLs and gait.     Admission to acute inpatient rehab 12/13/2021  .    Impairment group code: Stroke Ischemic 01.3 Bilateral Involvement: embolic CVA affecting B cerebral hemispheres and L cerebellum, as well as s/p bilateral lung transplantation     1. PT, OT 90 minutes of each on a daily basis, in addition to rehab nursing and close management of physiatrist.   2. Impairment of ADLs/IADLs:  OT for 60 min daily to work on upper and lower body self care, dressing, toileting, bathing, energy conservation techniques with use of ADs as needed.   3. Impairment of mobility:   PT for 60 min daily to work on gait exercises, strengthening, endurance buildup, transfers with use of walker as needed.   4. Impairment of cognition:  SLP for 60 minutes for cognitive evaluation and treatment strategies for higher level cognitive deficits, and memory impairment.  5. Rehab RN to administer medication, patient education on medication taking, VS monitoring, bowel regimen, glucose monitoring and wound care/surgical wound dressing changes and monitoring.   6. Medical Conditions - Hospital ist team consulted, appreciate assistance     Neuro/Psych  #Multifocal acute to subacute ischemic stroke, etiology likely embolic vs perioperative  Initially noted 10/22 and 11/6 with stroke code called for change in exam.  MRI brain 10/23 with infarcts noted in both cerebral hemispheres (R frontal, L corona radiata, bilateral occipital), left cerebellum, presumed associated with new Afib vs perioperative. Bilateral upper extremity paresis (R>L), suspicion for some cortical blindness  - Stroke risk factor management:              -Warfarin anticoagulation for Afib, pharmacy to dose              -BP goals: SBP<140              - Rosuvastatin 10 mg  qday              -Not smoking              -Hgb A1c: 6.6     #Pain  -Acetaminophen 975mg q8h  -Lidocaine patch  -Menthol patch     #Encephalopathy  #Sleep  -Delirium precautions  -Seroquel 50 mg at bedtime, 25mg TID prn; scheduled 25mg discontinued 12/14, continue to wean as able  -Melatonin 10 nightly     #Mood  #History of anxiety and depression  - PTA Lexapro increased to 10 mg while in acute hospital, increased to 20mg 12/14  -Hydroxyzine as needed         Pulmonary  #ILD, NISP, s/p bilateral lung transplant (10/16/2021)  #Acute on chronic respiratory failure s/p tracheostomy (10/29/21) and decannulation (12/8/21)  #Bilateral Pleural Effusions  -Sternal precautions until 12 wks post op (1/8/2022)  -Immunosuppression:              -s/p basiliximab on 10/16, 10/20              -Continue tacrolimus 2.5mg qam and 2mg qhs, goal 8-12              -Continue MMF 1000mg BID              -Continue prednisone 10mg BID  -Monitoring lab/imaging              -DSA q2wks              -Monthly Coccidioides Ag due 12/17              -CMV, EBV from 12/15 pending.              -CXR, CBC, BMP, Tac level M/Th  -Continue Levalbuterol and mucomyst nebs QID  -Aggressive pulmonary toilet and chest PT QID  -Continue Lasix 40mg qday     #SALTY  -PTA CPAP     ID  #Immunosuppression Prophylaxsis   -Bactrim x6 months  -Nystatin x6 months     #L lung cavitary lesion  #Hospital-acquired pneumonia with Klebsiella pneumoniae, Pseudomonas fluorescens/putida  Suspected aspiration versus pseudomonal vs Klebsiella pneumonia related to abscess.  CT on 1025 with left lower lobe nodular opacity with repeat on 11/22 showing new cavitary lesion in left lung base, multifocal bilateral upper lobe groundglass opacity, and new 1.8 cm fluid collection with surrounding fat stranding in left axilla, decreased bilateral loculated pleural effusions.  QuantiFERON gold indeterminate, negative tuberculin skin test on multiple occurrences.  Underwent BAL on 11/22 with  cultures growing Klebsiella and Pseudomonas fluorescens/putida, resistant to meropenem.  ID was consulted and feels this is likely fungal.  B-D glucan positive with known candidemia.  Cocci antigen in urine negative, serum histoplasmosis negative, CRAG negative  -IV ceftazidime 2g q8hrs (end 12/21)  -Follow-up chest CT 12/21 and will discuss with ID/Dr. Abebe after     #Invasive Candida albicans candidiasis  Positive Candida blood cultures 10/20, 10/22.  Fungitell positive (399).  TC 10/23 without evidence of endocarditis. Ophthalmic consulted 10/24, benign funduscopic exam.  Candida empyema 10/25.  Repeated pleural effusion fungal cultures and fungal/bacterial blood cultures have been without growth since 11/18.  Initially on micafungin (10/22-10/27) before transition to fluconazole IV  -Continue IV Fluconazole 400mg daily (10/26-12/21)     #hx of HSV  Chronic intermittent infection pretransplant, with recent HSV infection while in acute hospital (crusted lesions along left jaw) s/p 10d treatment until 10/9.     Cardiovascular  #Afib  Initially noted 10/18, converted to NSR with amio ggt. Metoprolol and amiodarone discontinued due to bradycardia.  -Warfarin as dosed by pharmacy     #Hypertension  -PTA amlodipine 5 mg daily     Heme  #Leukocytosis  New leukocytosis to 13.2 on 12/14, but improved to 11.0 today.  No new infectious symptoms at this time.  Continue to monitor CBCs and for signs/symptoms of infection    #R subclavian DVT  -Warfarin anticoagulation, pharmacy to dose     #Hypogammaglobulinemia  S/p IVIG  -Repeat IgG 12/15 365     Endocrine  #Type 2 diabetes  #Steroid-induced hyperglycemia  -Endocrine consulted while in acute hospital. Reconsulted 12/14 with anticipation of transitioning TF, appreciate recs                 -NPH 30 units increase to 45 units tonight at start of TF (due to reduced glargine in circulation)                 -reduced AM Lantus to 15 units                 -discontinue PM  Lantus                 -Novolog high resistance sliding scale Q 4 hours -- this will continue tonight, but then will remove Q4 overnight for pt sleep if it is safe                 -start 1:15g CHO with meals, snacks,                  -BG monitoring Q 4 hours                 -hypoglycemia protocol                 -diabetes education needs will be assessed closer to discharge, when TF plan solidified                 -on discharge, will recommend outpatient follow up with [facility] service or endocrinology closer to home in [place]        #Hypothyroidism  TSH 3.17 on 11/19  -Continue levothyroxine 25 mcg daily     Renal  #BRENNAN, resolved  Cr 0.82 12/14  -Monitor intermittently     MSK/Rheum  #Rheumatoid arthritis  Previously treated with rituximab.  Rheumatology familiar and consulted early in admission.  -Steroids as above     GI/FEN  #Severe malnutrition  Patient currently has PEG in place, receiving majority of nutrition through cycled tube feeds  -Continue multivitamin, folate, B6, B12 supplements  -TF per dietician, appreciate assistance.  Currently cycled  -Encourage p.o. intake     #hx of GI Bleed  Patient with progressive hypotension and CODE BLUE for bradycardia/asystole associated with GI bleed in the setting of anticoagulation, requiring massive transfusion protocol on 10/22.  EGD on 11/2 with clotted blood in stomach, adherent clot near PEG site that was clipped without active bleeding.  Most recent EGD 11/5 with ulcer noted near the PEG bumper site, gastritis, suction marks from the G-tube.  GJ tube was exchanged 11/9 by GI. Hgb 10.2 12/15  -Monitor hemoglobin intermittently.  -Continue PPI BID      Bowel/Bladder  Bowel, continent  Constipation  - Senna-docusate 1-2 tablets BID  - Miralax QDAY PRN  - Bisacodyl suppository 10mg QDAY PRN     Bladder, continent     1. Adjustment to disability:  Clinical psychology to eval and treat as indicated  2. FEN: Regular with thin liquids, cycled  TF  3. Bowel: continent, meds as above  4. Bladder: continent  5. DVT Prophylaxis: warfarin  6. GI Prophylaxis: PPI  7. Code: full  8. Disposition: Local housing - [facility]  9. ELOS:  2-3 weeks  10. Rehab prognosis:  good  9. Follow up Appointments on Discharge:                -Neurology 1-2 months after discharge               -Outpatient cardiac/pulmonary Rehab               -Transplant Coordinator               -ID: Follow up with Dr. Abebe on 12/21/2021 (likely need to reschedule)     Lan Coronado MD  Department of Rehabilitation Medicine    Time Spent on this Encounter   I, Lan Coronado, spent a total of 25 minutes face-to-face or managing the care of Edson Thornton. Over 50% of my time on the unit was spent counseling the patient and coordinating care. See note for details.

## 2021-12-15 NOTE — PLAN OF CARE
"FOCUS/GOAL  Nutrition/Feeding/Swallowing precautions, Mobility, Skin integrity, and Safety management    ASSESSMENT, INTERVENTIONS AND CONTINUING PLAN FOR GOAL:    VS: BP (!) 151/77 (BP Location: Left arm)   Pulse 98   Temp 97.4  F (36.3  C) (Oral)   Resp 20   Ht 1.646 m (5' 4.8\")   Wt 67.9 kg (149 lb 12.8 oz)   SpO2 94%   BMI 25.08 kg/m       O2: Stable on room air.    Output: Continent of both bowel and bladder   Last BM: 12/14   Activity: Assist x 1 with walker and gait belt   Skin: Old trach site, PEG, PICC, bruises on arms   Pain: denies   CMS: Baseline numbness and tingling in bilateral hands and feet. Denies any new numbness or tingling. Tremors in bilateral upper extremities.    Dressing: Old trach site and PEG dressing changed    Diet: 2 Gram K diet, Tube feeding Vital 1.5 80 ml/hr x12, FWF 100ml q4hr   LDA: Right arm triple lumen PICC, PEG   Equipment: IV pole, kangaroo pump, personal belongings   Plan: A&Ox4. Uses call light appropriately and makes needs known.   - Pt ambulated to bathroom O2 dropped to 86%, O2 offered PRN but refused. O2 Sat. increased to 96% when transferred back to bed.         "

## 2021-12-15 NOTE — PLAN OF CARE
FOCUS/GOAL  Medical management    ASSESSMENT, INTERVENTIONS AND CONTINUING PLAN FOR GOAL:  Asleep at shift change, patient had a better night of sleep.  Cares clustered as possible to allow rest and sleep. Denied pain, declined 0400 Tylenol.  Enteral feeding completed around 0600 AM. PEG patent. Only one lumen of PICC flushed this shift ( the one used to infuse scheduled IV med.). Voided using urinal. Will continue with POC.

## 2021-12-16 ENCOUNTER — APPOINTMENT (OUTPATIENT)
Dept: SPEECH THERAPY | Facility: CLINIC | Age: 56
End: 2021-12-16
Attending: PHYSICAL MEDICINE & REHABILITATION
Payer: COMMERCIAL

## 2021-12-16 ENCOUNTER — APPOINTMENT (OUTPATIENT)
Dept: PHYSICAL THERAPY | Facility: CLINIC | Age: 56
End: 2021-12-16
Attending: PHYSICAL MEDICINE & REHABILITATION
Payer: COMMERCIAL

## 2021-12-16 ENCOUNTER — APPOINTMENT (OUTPATIENT)
Dept: GENERAL RADIOLOGY | Facility: CLINIC | Age: 56
End: 2021-12-16
Attending: INTERNAL MEDICINE
Payer: COMMERCIAL

## 2021-12-16 ENCOUNTER — APPOINTMENT (OUTPATIENT)
Dept: OCCUPATIONAL THERAPY | Facility: CLINIC | Age: 56
End: 2021-12-16
Attending: PHYSICAL MEDICINE & REHABILITATION
Payer: COMMERCIAL

## 2021-12-16 DIAGNOSIS — Z94.2 LUNG REPLACED BY TRANSPLANT (H): Primary | ICD-10-CM

## 2021-12-16 DIAGNOSIS — D84.9 IMMUNOSUPPRESSED STATUS (H): ICD-10-CM

## 2021-12-16 LAB
ANION GAP SERPL CALCULATED.3IONS-SCNC: 4 MMOL/L (ref 3–14)
BASOPHILS # BLD AUTO: 0 10E3/UL (ref 0–0.2)
BASOPHILS NFR BLD AUTO: 0 %
BUN SERPL-MCNC: 29 MG/DL (ref 7–30)
CALCIUM SERPL-MCNC: 9.5 MG/DL (ref 8.5–10.1)
CHLORIDE BLD-SCNC: 106 MMOL/L (ref 94–109)
CO2 SERPL-SCNC: 27 MMOL/L (ref 20–32)
CREAT SERPL-MCNC: 0.74 MG/DL (ref 0.66–1.25)
EBV DNA # SPEC NAA+PROBE: NOT DETECTED COPIES/ML
EOSINOPHIL # BLD AUTO: 0 10E3/UL (ref 0–0.7)
EOSINOPHIL NFR BLD AUTO: 0 %
ERYTHROCYTE [DISTWIDTH] IN BLOOD BY AUTOMATED COUNT: 14.1 % (ref 10–15)
GFR SERPL CREATININE-BSD FRML MDRD: >90 ML/MIN/1.73M2
GLUCOSE BLD-MCNC: 90 MG/DL (ref 70–99)
GLUCOSE BLDC GLUCOMTR-MCNC: 112 MG/DL (ref 70–99)
GLUCOSE BLDC GLUCOMTR-MCNC: 112 MG/DL (ref 70–99)
GLUCOSE BLDC GLUCOMTR-MCNC: 116 MG/DL (ref 70–99)
GLUCOSE BLDC GLUCOMTR-MCNC: 173 MG/DL (ref 70–99)
GLUCOSE BLDC GLUCOMTR-MCNC: 184 MG/DL (ref 70–99)
GLUCOSE BLDC GLUCOMTR-MCNC: 254 MG/DL (ref 70–99)
GLUCOSE BLDC GLUCOMTR-MCNC: 81 MG/DL (ref 70–99)
HCT VFR BLD AUTO: 33.7 % (ref 40–53)
HGB BLD-MCNC: 10.6 G/DL (ref 13.3–17.7)
HOLD SPECIMEN: NORMAL
IMM GRANULOCYTES # BLD: 0.2 10E3/UL
IMM GRANULOCYTES NFR BLD: 1 %
INR PPP: 2.64 (ref 0.86–1.14)
LACTATE SERPL-SCNC: 1.6 MMOL/L (ref 0.7–2)
LYMPHOCYTES # BLD AUTO: 0.8 10E3/UL (ref 0.8–5.3)
LYMPHOCYTES NFR BLD AUTO: 5 %
MCH RBC QN AUTO: 28 PG (ref 26.5–33)
MCHC RBC AUTO-ENTMCNC: 31.5 G/DL (ref 31.5–36.5)
MCV RBC AUTO: 89 FL (ref 78–100)
MONOCYTES # BLD AUTO: 0.9 10E3/UL (ref 0–1.3)
MONOCYTES NFR BLD AUTO: 6 %
NEUTROPHILS # BLD AUTO: 13.9 10E3/UL (ref 1.6–8.3)
NEUTROPHILS NFR BLD AUTO: 88 %
NRBC # BLD AUTO: 0 10E3/UL
NRBC BLD AUTO-RTO: 0 /100
PLATELET # BLD AUTO: 199 10E3/UL (ref 150–450)
POTASSIUM BLD-SCNC: 4.8 MMOL/L (ref 3.4–5.3)
RBC # BLD AUTO: 3.79 10E6/UL (ref 4.4–5.9)
SODIUM SERPL-SCNC: 137 MMOL/L (ref 133–144)
TACROLIMUS BLD-MCNC: 8.6 UG/L (ref 5–15)
TME LAST DOSE: NORMAL H
TME LAST DOSE: NORMAL H
WBC # BLD AUTO: 15.8 10E3/UL (ref 4–11)

## 2021-12-16 PROCEDURE — 97530 THERAPEUTIC ACTIVITIES: CPT | Mod: GP

## 2021-12-16 PROCEDURE — 99232 SBSQ HOSP IP/OBS MODERATE 35: CPT | Mod: 24 | Performed by: PHYSICAL MEDICINE & REHABILITATION

## 2021-12-16 PROCEDURE — 97129 THER IVNTJ 1ST 15 MIN: CPT | Mod: GN

## 2021-12-16 PROCEDURE — 128N000003 HC R&B REHAB

## 2021-12-16 PROCEDURE — 85025 COMPLETE CBC W/AUTO DIFF WBC: CPT | Performed by: PHYSICAL MEDICINE & REHABILITATION

## 2021-12-16 PROCEDURE — 36415 COLL VENOUS BLD VENIPUNCTURE: CPT | Performed by: PHYSICAL MEDICINE & REHABILITATION

## 2021-12-16 PROCEDURE — 94640 AIRWAY INHALATION TREATMENT: CPT

## 2021-12-16 PROCEDURE — 97110 THERAPEUTIC EXERCISES: CPT | Mod: GO | Performed by: STUDENT IN AN ORGANIZED HEALTH CARE EDUCATION/TRAINING PROGRAM

## 2021-12-16 PROCEDURE — 82435 ASSAY OF BLOOD CHLORIDE: CPT | Performed by: PHYSICAL MEDICINE & REHABILITATION

## 2021-12-16 PROCEDURE — 83605 ASSAY OF LACTIC ACID: CPT | Performed by: PHYSICAL MEDICINE & REHABILITATION

## 2021-12-16 PROCEDURE — 96125 COGNITIVE TEST BY HC PRO: CPT | Mod: GN

## 2021-12-16 PROCEDURE — 272N000083 HC NUTRITION PRODUCT SEMIELEM INTERMED LITER

## 2021-12-16 PROCEDURE — 250N000012 HC RX MED GY IP 250 OP 636 PS 637: Performed by: INTERNAL MEDICINE

## 2021-12-16 PROCEDURE — 85610 PROTHROMBIN TIME: CPT | Performed by: INTERNAL MEDICINE

## 2021-12-16 PROCEDURE — 250N000009 HC RX 250: Performed by: PHYSICAL MEDICINE & REHABILITATION

## 2021-12-16 PROCEDURE — 250N000013 HC RX MED GY IP 250 OP 250 PS 637: Performed by: INTERNAL MEDICINE

## 2021-12-16 PROCEDURE — 71046 X-RAY EXAM CHEST 2 VIEWS: CPT | Mod: 26 | Performed by: RADIOLOGY

## 2021-12-16 PROCEDURE — 250N000013 HC RX MED GY IP 250 OP 250 PS 637: Performed by: PHYSICAL MEDICINE & REHABILITATION

## 2021-12-16 PROCEDURE — 999N000157 HC STATISTIC RCP TIME EA 10 MIN

## 2021-12-16 PROCEDURE — 94640 AIRWAY INHALATION TREATMENT: CPT | Mod: 76

## 2021-12-16 PROCEDURE — 250N000011 HC RX IP 250 OP 636: Performed by: PHYSICAL MEDICINE & REHABILITATION

## 2021-12-16 PROCEDURE — 99233 SBSQ HOSP IP/OBS HIGH 50: CPT | Mod: 24 | Performed by: CLINICAL NURSE SPECIALIST

## 2021-12-16 PROCEDURE — 97535 SELF CARE MNGMENT TRAINING: CPT | Mod: GO | Performed by: STUDENT IN AN ORGANIZED HEALTH CARE EDUCATION/TRAINING PROGRAM

## 2021-12-16 PROCEDURE — 250N000009 HC RX 250: Performed by: INTERNAL MEDICINE

## 2021-12-16 PROCEDURE — 97130 THER IVNTJ EA ADDL 15 MIN: CPT | Mod: GN

## 2021-12-16 PROCEDURE — 71046 X-RAY EXAM CHEST 2 VIEWS: CPT

## 2021-12-16 PROCEDURE — 36592 COLLECT BLOOD FROM PICC: CPT | Performed by: INTERNAL MEDICINE

## 2021-12-16 PROCEDURE — 99232 SBSQ HOSP IP/OBS MODERATE 35: CPT | Performed by: INTERNAL MEDICINE

## 2021-12-16 PROCEDURE — 92526 ORAL FUNCTION THERAPY: CPT | Mod: GN

## 2021-12-16 PROCEDURE — 80197 ASSAY OF TACROLIMUS: CPT | Performed by: NURSE PRACTITIONER

## 2021-12-16 RX ORDER — DIPHENHYDRAMINE HYDROCHLORIDE 50 MG/ML
50 INJECTION INTRAMUSCULAR; INTRAVENOUS ONCE
Status: DISCONTINUED | OUTPATIENT
Start: 2021-12-16 | End: 2021-12-16

## 2021-12-16 RX ORDER — ACETAMINOPHEN 325 MG/1
650 TABLET ORAL ONCE
Status: DISCONTINUED | OUTPATIENT
Start: 2021-12-16 | End: 2021-12-16

## 2021-12-16 RX ORDER — WARFARIN SODIUM 1 MG/1
2 TABLET ORAL
Status: COMPLETED | OUTPATIENT
Start: 2021-12-16 | End: 2021-12-16

## 2021-12-16 RX ORDER — DIPHENHYDRAMINE HCL 25 MG
50 CAPSULE ORAL ONCE
Status: DISCONTINUED | OUTPATIENT
Start: 2021-12-16 | End: 2021-12-16

## 2021-12-16 RX ORDER — METHYLPREDNISOLONE SODIUM SUCCINATE 125 MG/2ML
125 INJECTION, POWDER, LYOPHILIZED, FOR SOLUTION INTRAMUSCULAR; INTRAVENOUS
Status: DISCONTINUED | OUTPATIENT
Start: 2021-12-16 | End: 2021-12-16

## 2021-12-16 RX ORDER — DIPHENHYDRAMINE HYDROCHLORIDE 50 MG/ML
50 INJECTION INTRAMUSCULAR; INTRAVENOUS
Status: DISCONTINUED | OUTPATIENT
Start: 2021-12-16 | End: 2021-12-16

## 2021-12-16 RX ORDER — ACETAMINOPHEN 325 MG/1
650 TABLET ORAL
Status: DISCONTINUED | OUTPATIENT
Start: 2021-12-16 | End: 2021-12-16

## 2021-12-16 RX ORDER — DIPHENHYDRAMINE HCL 25 MG
50 CAPSULE ORAL
Status: DISCONTINUED | OUTPATIENT
Start: 2021-12-16 | End: 2021-12-16

## 2021-12-16 RX ORDER — OXYCODONE HYDROCHLORIDE 5 MG/1
5 TABLET ORAL 3 TIMES DAILY PRN
Status: DISCONTINUED | OUTPATIENT
Start: 2021-12-16 | End: 2021-12-20 | Stop reason: CLARIF

## 2021-12-16 RX ADMIN — AMLODIPINE BESYLATE 5 MG: 5 TABLET ORAL at 08:38

## 2021-12-16 RX ADMIN — CEFTAZIDIME 2 G: 2 INJECTION, POWDER, FOR SOLUTION INTRAVENOUS at 20:07

## 2021-12-16 RX ADMIN — CALCIUM CARBONATE 600 MG (1,500 MG)-VITAMIN D3 400 UNIT TABLET 1 TABLET: at 08:38

## 2021-12-16 RX ADMIN — LEVALBUTEROL HYDROCHLORIDE 1.25 MG: 1.25 SOLUTION RESPIRATORY (INHALATION) at 21:27

## 2021-12-16 RX ADMIN — FLUTICASONE PROPIONATE 1 SPRAY: 50 SPRAY, METERED NASAL at 08:39

## 2021-12-16 RX ADMIN — SULFAMETHOXAZOLE AND TRIMETHOPRIM 1 TABLET: 400; 80 TABLET ORAL at 08:39

## 2021-12-16 RX ADMIN — Medication 1 TABLET: at 08:37

## 2021-12-16 RX ADMIN — QUETIAPINE FUMARATE 50 MG: 50 TABLET ORAL at 20:10

## 2021-12-16 RX ADMIN — NYSTATIN 1000000 UNITS: 100000 SUSPENSION ORAL at 08:38

## 2021-12-16 RX ADMIN — PREDNISONE 10 MG: 10 TABLET ORAL at 08:38

## 2021-12-16 RX ADMIN — PANTOPRAZOLE SODIUM 40 MG: 40 TABLET, DELAYED RELEASE ORAL at 16:24

## 2021-12-16 RX ADMIN — FUROSEMIDE 40 MG: 40 TABLET ORAL at 08:38

## 2021-12-16 RX ADMIN — ACETYLCYSTEINE 2 ML: 200 SOLUTION ORAL; RESPIRATORY (INHALATION) at 21:27

## 2021-12-16 RX ADMIN — LEVALBUTEROL HYDROCHLORIDE 1.25 MG: 1.25 SOLUTION RESPIRATORY (INHALATION) at 13:39

## 2021-12-16 RX ADMIN — MYCOPHENOLATE MOFETIL 1000 MG: 250 CAPSULE ORAL at 20:10

## 2021-12-16 RX ADMIN — NYSTATIN 1000000 UNITS: 100000 SUSPENSION ORAL at 16:24

## 2021-12-16 RX ADMIN — PANTOPRAZOLE SODIUM 40 MG: 40 TABLET, DELAYED RELEASE ORAL at 08:38

## 2021-12-16 RX ADMIN — ROSUVASTATIN CALCIUM 10 MG: 10 TABLET, FILM COATED ORAL at 08:46

## 2021-12-16 RX ADMIN — ESCITALOPRAM OXALATE 20 MG: 20 TABLET ORAL at 08:38

## 2021-12-16 RX ADMIN — CEFTAZIDIME 2 G: 2 INJECTION, POWDER, FOR SOLUTION INTRAVENOUS at 12:32

## 2021-12-16 RX ADMIN — Medication 800 MG: at 08:37

## 2021-12-16 RX ADMIN — FLUCONAZOLE, SODIUM CHLORIDE 400 MG: 2 INJECTION INTRAVENOUS at 09:32

## 2021-12-16 RX ADMIN — LEVALBUTEROL HYDROCHLORIDE 1.25 MG: 1.25 SOLUTION RESPIRATORY (INHALATION) at 08:52

## 2021-12-16 RX ADMIN — MYCOPHENOLATE MOFETIL 1000 MG: 250 CAPSULE ORAL at 08:37

## 2021-12-16 RX ADMIN — NYSTATIN 1000000 UNITS: 100000 SUSPENSION ORAL at 12:31

## 2021-12-16 RX ADMIN — Medication 400 MG: at 18:32

## 2021-12-16 RX ADMIN — NYSTATIN 1000000 UNITS: 100000 SUSPENSION ORAL at 20:10

## 2021-12-16 RX ADMIN — ACETAMINOPHEN 975 MG: 325 TABLET, FILM COATED ORAL at 12:30

## 2021-12-16 RX ADMIN — CALCIUM CARBONATE 600 MG (1,500 MG)-VITAMIN D3 400 UNIT TABLET 1 TABLET: at 18:32

## 2021-12-16 RX ADMIN — WARFARIN SODIUM 2 MG: 1 TABLET ORAL at 18:33

## 2021-12-16 RX ADMIN — TACROLIMUS 2 MG: 1 CAPSULE ORAL at 18:33

## 2021-12-16 RX ADMIN — TACROLIMUS 2.5 MG: 1 CAPSULE ORAL at 08:37

## 2021-12-16 RX ADMIN — CEFTAZIDIME 2 G: 2 INJECTION, POWDER, FOR SOLUTION INTRAVENOUS at 04:52

## 2021-12-16 RX ADMIN — ACETYLCYSTEINE 2 ML: 200 SOLUTION ORAL; RESPIRATORY (INHALATION) at 13:39

## 2021-12-16 RX ADMIN — PREDNISONE 10 MG: 10 TABLET ORAL at 20:10

## 2021-12-16 RX ADMIN — LEVOTHYROXINE SODIUM 25 MCG: 25 TABLET ORAL at 08:37

## 2021-12-16 RX ADMIN — ACETYLCYSTEINE 2 ML: 200 SOLUTION ORAL; RESPIRATORY (INHALATION) at 08:52

## 2021-12-16 RX ADMIN — Medication 1 PACKET: at 08:39

## 2021-12-16 RX ADMIN — MULTIPLE VITAMINS W/ MINERALS TAB 1 TABLET: TAB at 08:38

## 2021-12-16 RX ADMIN — ALBUTEROL SULFATE 2.5 MG: 2.5 SOLUTION RESPIRATORY (INHALATION) at 07:55

## 2021-12-16 RX ADMIN — Medication 1 PACKET: at 20:17

## 2021-12-16 ASSESSMENT — ACTIVITIES OF DAILY LIVING (ADL)
ADLS_ACUITY_SCORE: 23
ADLS_ACUITY_SCORE: 24
ADLS_ACUITY_SCORE: 23
ADLS_ACUITY_SCORE: 24
ADLS_ACUITY_SCORE: 23
ADLS_ACUITY_SCORE: 24
ADLS_ACUITY_SCORE: 23
ADLS_ACUITY_SCORE: 24
ADLS_ACUITY_SCORE: 23

## 2021-12-16 NOTE — PLAN OF CARE
FOCUS/GOAL  Bowel management, Bladder management, Nutrition/Feeding/Swallowing precautions, Pain management, Mobility, Skin integrity, and Cognition/Memory/Judgment/Problem solving    ASSESSMENT, INTERVENTIONS AND CONTINUING PLAN FOR GOAL:    Patient A&Ox4. Patient denies pain, headache, CP, nausea, new numbness and tingling, and fever. Dyspneic during exertion. Transfers Ax1 with walker, up to toilet. Cont of urine, uses urinal, incont of bowel, loose MD BM during shift. Level 7- Easy to chew and 2g K diet, thin liquids, and takes pills whole. Patient refused dinner. J-tube receiving Vital 1.5 at 80 mL/hr from 6pm to 6am, 100cc water flushes every 4 hrs. LUE triple lumen non-powered PICC, saline locked after abx. Bgs 170 and 143. Blanchable redness noted to buttock, trach site dressing changed during shift. Chest tube wounds approximated. VSS. Continue POC.

## 2021-12-16 NOTE — PLAN OF CARE
Sob breath with therapy this morning which limited his participation, o2 sat's remains at 95% despite sob, also increased tremors noted to the extent that he cannot hold his water cup steady, also observe his face to be red without no rash,redness  subsided before lunch, PMR and the resident saw him at the time and was later seen by medicine. SOB was better whilst amb to the BR around lunch time  compared to this morning. C xray was completed this afternoon, see results. Patient at good at breakfast  and consumed 70 grams of carbs and 15 grams from lunch, carb coverage completed. BG's today were 118 and 81. Picc line his left patent and IV abx administered, will continue POC

## 2021-12-16 NOTE — PROGRESS NOTES
IP Diabetes Management  Daily Note           Assessment and Plan:   HPI: Edson Thornton is a 56 year old male with a PMH significant for NSIP/ILD, bronchiectasis, moderate PH, RA, SALTY, chronic HSV infection, hypogammaglobulinemia, steroid-induced diabetes, hypothyroidism, PFO, HTN, HLD, duodenal anomaly, anxiety, and depression.  Admitted on 9/5/21 from OSH for acute on chronic respiratory failure 2/2 ILD exacerbation, now s/p BSLT on 10/16/21.    His hospitalization has been very prolonged and complicated, including bilateral multifocal embolic strokes, difficulty weaning status post trach (now decannulated) and PEG placement, recurrent GI bleed with progressive hypotension and CODE BLUE call requiring massive transfusion protocol with ulcer noted at the PEG bumper site, encephalopathy, atrial fibrillation with RVR, BRENNAN, Candida empyema and candidemia currently on IV fluconazole, HCAP on IV ceftazidime, hypogammaglobulin anemia status post IVIG, and right subclavian DVT.  Admitted to ARU on 12/13/21.  Endocrine called to assist with insulin and TF, anticipating transition to bolus TF in coming days.  Endocrine had signed off on 11/19/21 and recalled 12/15/21.    Assessment:   1)steroid induced Diabetes Mellitus, controlled (A1c 6.6 prior to transplant), with TF and steroid induced hyperglycemia  2) tube feeding induced hyperglycemia  3) S/P BSLT 10/16/21      Plan:    -NPH 45 units at start of TF cycle   - AM Lantus to 15 units--> reduce to 13 units tomorrow    -Novolog high resistance sliding scale change to 5x, 0200, 0800, 1200, 1800, 2200   -continue 1:15g CHO with meals, snacks,                  -BG monitoring QAC, HS                  -hypoglycemia protocol                 -diabetes education needs will be assessed closer to discharge, when TF plan solidified                 -on discharge, will recommend outpatient follow up with [facility] service or endocrinology closer to home [facility]  [place]      Plan discussed with pt and RN and primary team.  Diabetes service will sign off from daily visits for now.  Please page when next nutrition change is due.  Prednisone taper is unlikely to call for insulin dose change, but monitoring recommended.    Interval History and Assessment: interval glucose trend reviewed:     No significant hypoglycemia when TF off, likely because no CHO coverage ordered.  TF is Vital 1.5 at 80 ml/hour on 6p-6a to provide 187g CHO.      --Redistributed insulin for cycle TF, using NPH    [place] says no questions about insulin plan.      May transiton to bolus feeds in coming days-- though note oral calorie intake yesterday was fairly low.   Once he switches to bolus feeds-will do fixed dose of novolog for that.      Not charted, but RN confirms pt did get 7 units for 98 grams carb at lunch yesterday          Current nutritional intake and type: Orders Placed This Encounter      2 Gram K Diet      Planned Procedures/surgeries: none  Steroid planning: pred 10 mg PO BID, taper next on   To 10 + 7.5    D5W-containing solutions/medications: medications as suspensions that likely contain glucose    PTA Diabetes Regimen:   Lantus 5 units daily  Novolog 1:15g CHO with meals  novolog 3-11 units with meals and bedtime, ?sliding scale insulin   BG monitor: One Touch Verio  Frequency of checks: unknown    Discharge Plannin/30?           Diabetes History:   Type of Diabetes: Steroid Induced Diabetes Mellitus, stress hyperglycemia  Lab Results   Component Value Date    A1C 5.3 11/15/2021    A1C 6.6 2021    A1C 6.5 2021              Review of Systems:     See interval hx           Medications:     Current Facility-Administered Medications   Medication     acetaminophen (TYLENOL) tablet 975 mg     acetylcysteine (MUCOMYST) 20 % nebulizer solution 2 mL     albuterol (PROVENTIL) neb solution 2.5 mg     amLODIPine (NORVASC) tablet 5 mg     bisacodyl (DULCOLAX) Suppository 10 mg      calcium carbonate 600 mg-vitamin D 400 units (CALTRATE) per tablet 1 tablet     cefTAZidime (FORTAZ) 2 g vial to attach to  ml bag for ADULTS or NS 50 ml bag for PEDS     dextrose 10% infusion     glucose gel 15-30 g    Or     dextrose 50 % injection 25-50 mL    Or     glucagon injection 1 mg     escitalopram (LEXAPRO) tablet 20 mg     fiber modular (NUTRISOURCE FIBER) packet 1 packet     fluconazole (DIFLUCAN) intermittent infusion 400 mg in NaCl     fluticasone (FLONASE) 50 MCG/ACT spray 1 spray     folic acid-vit B6-vit B12 (FOLGARD) per tablet 1 tablet     furosemide (LASIX) tablet 40 mg     hydrOXYzine (ATARAX) tablet 25 mg     insulin aspart (NovoLOG) injection (RAPID ACTING)     insulin aspart (NovoLOG) injection (RAPID ACTING)     insulin aspart (NovoLOG) injection (RAPID ACTING)     insulin glargine (LANTUS PEN) injection 15 Units     insulin NPH injection 45 Units     levalbuterol (XOPENEX) neb solution 1.25 mg     levothyroxine (SYNTHROID/LEVOTHROID) tablet 25 mcg     Lidocaine (LIDOCARE) 4 % Patch 2 patch     lidocaine (viscous) (XYLOCAINE) 2 % solution 5 mL     lidocaine patch in PLACE     magnesium hydroxide (MILK OF MAGNESIA) suspension 30 mL     magnesium oxide (MAG-OX) tablet 400 mg     magnesium oxide (MAG-OX) tablet 800 mg     melatonin tablet 10 mg     menthol (ICY HOT) 5 % patch 1 patch     multivitamin w/minerals (THERA-VIT-M) tablet 1 tablet     mycophenolate (GENERIC EQUIVALENT) capsule 1,000 mg     naloxone (NARCAN) injection 0.2 mg    Or     naloxone (NARCAN) injection 0.4 mg    Or     naloxone (NARCAN) injection 0.2 mg    Or     naloxone (NARCAN) injection 0.4 mg     nystatin (MYCOSTATIN) suspension 1,000,000 Units     ondansetron (ZOFRAN-ODT) ODT tab 4 mg     oxyCODONE (ROXICODONE) tablet 5-10 mg     pantoprazole (PROTONIX) EC tablet 40 mg     Patient is already receiving anticoagulation with heparin, enoxaparin (LOVENOX), warfarin (COUMADIN)  or other anticoagulant medication  "    predniSONE (DELTASONE) tablet 10 mg     prochlorperazine (COMPAZINE) tablet 10 mg     QUEtiapine (SEROquel) tablet 25 mg     QUEtiapine (SEROquel) tablet 50 mg     rosuvastatin (CRESTOR) tablet 10 mg     senna-docusate (SENOKOT-S/PERICOLACE) 8.6-50 MG per tablet 1 tablet     sodium chloride (PF) 0.9% PF flush 10-20 mL     sodium chloride (PF) 0.9% PF flush 10-40 mL     sulfamethoxazole-trimethoprim (BACTRIM) 400-80 MG per tablet 1 tablet     tacrolimus (GENERIC EQUIVALENT) capsule 2 mg     tacrolimus (GENERIC EQUIVALENT) capsule 2.5 mg     Warfarin Therapy Reminder (Check START DATE - warfarin may be starting in the FUTURE)     zinc-white petrolatum (ILEX) 58.3 % paste 1 g            Physical Exam:    /84 (BP Location: Right arm)   Pulse 97   Temp 97.3  F (36.3  C) (Oral)   Resp 20   Ht 1.646 m (5' 4.8\")   Wt 67.9 kg (149 lb 12.8 oz)   SpO2 100%   BMI 25.08 kg/m    Gen: up in room  Neck:   Resp: unlabored , no cough  Mental status: alert and oriented, communicating clearly  Pysch: calm, slightly flat affect            Data:     Recent Labs   Lab 12/16/21  0542 12/16/21  0210 12/15/21  2157 12/15/21  1704 12/15/21  1137 12/15/21  0739   * 173* 143* 170* 104* 86     Lab Results   Component Value Date    WBC 11.0 12/15/2021    WBC 13.2 (H) 12/14/2021    WBC 9.7 12/12/2021    HGB 10.2 (L) 12/15/2021    HGB 11.5 (L) 12/14/2021    HGB 9.8 (L) 12/12/2021    HCT 31.7 (L) 12/15/2021    HCT 36.2 (L) 12/14/2021    HCT 30.6 (L) 12/12/2021    MCV 90 12/15/2021    MCV 90 12/14/2021    MCV 93 12/12/2021     12/15/2021     12/14/2021     (L) 12/12/2021     Lab Results   Component Value Date     12/14/2021     12/12/2021     12/10/2021    POTASSIUM 4.4 12/15/2021    POTASSIUM 5.0 12/14/2021    POTASSIUM 4.5 12/12/2021    CHLORIDE 101 12/14/2021    CHLORIDE 108 12/12/2021    CHLORIDE 103 12/10/2021    CO2 25 12/14/2021    CO2 26 12/12/2021    CO2 27 12/10/2021     " (H) 12/16/2021     (H) 12/16/2021     (H) 12/15/2021     Lab Results   Component Value Date    BUN 33 (H) 12/14/2021    BUN 34 (H) 12/12/2021    BUN 30 12/10/2021     Lab Results   Component Value Date    TSH 3.17 11/19/2021    TSH 7.90 (H) 10/29/2021    TSH 0.11 (L) 09/07/2021     Lab Results   Component Value Date    AST 17 12/12/2021    AST 19 12/10/2021    AST 17 12/08/2021    ALT 26 12/12/2021    ALT 24 12/10/2021    ALT 25 12/08/2021    ALKPHOS 104 12/12/2021    ALKPHOS 105 12/10/2021    ALKPHOS 107 12/08/2021           I spent a total of 35 minutes bedside and on the inpatient unit managing the glycemic care of Edson Thornton. Over 50% of my time on the unit was spent counseling the patient  and/or coordinating care regarding steroid, TF exacerbated diabetes.  See note for details.  Tammie Barksdale APRN [id number]      To contact Endocrine Diabetes service:   From 8AM-4PM: page inpatient diabetes provider that is following the patient  For questions or updates from 4PM-8AM: page the diabetes job code for on call fellow: [phone]

## 2021-12-16 NOTE — PROGRESS NOTES
Pulmonary Medicine  Cystic Fibrosis - Lung Transplant Team  Progress Note  2021       Patient: Edson Thornton  MRN: [id number]  : 1965 (age 56 year old)  Transplant: 10/16/2021 (Lung), POD#61  Admission date: 2021    Assessment & Plan:     Edson Thornton is a 56 year old male with a PMH significant for NSIP/ILD, bronchiectasis, moderate PH, RA, SALTY, chronic HSV infection, hypogammaglobulinemia, steroid-induced diabetes, hypothyroidism, PFO, HTN, HLD, duodenal anomaly, anxiety, and depression.  Admitted on 21 from OSH for acute on chronic respiratory failure 2/2 ILD exacerbation, now s/p BSLT on 10/16/21. Prolonged vent wean s/p trach (10/29-) and PEG/J tube (10/29). Post-op course also complicated by encephalopathy and diffuse weakness, acute to subacute CVA, afib with RVR, BRENNAN, GI bleed, Candidemia/Candida empyema, and anxiety.  Code called  for bradycardia/asystole, progressive hypotension, found to have significant GI bleed, EGD with adherent clot near PEG tube site that was clipped. Discharged to ARU for ongoing rehabilitation.     Recommendations:   - Coccidioides Ag (blood)  (ordered)  - CXR on Monday,  (ordered)  - Timed tacrolimus on Monday,  (ordered)  - EBV pending  - DSA due  (ordered)  - Prednisone taper due 22   - Continue ceftazidime and fluconazole through  per transplant ID (will need transplant ID follow up and chest CT around )  - Evaluate need for BiPAP after trach site has healed    S/p BSLT for ILD:  Acute hypoxic respiratory failure, Resolved:   Prolonged vent weaning s/p trach, Resolved:  Bilateral pleural effusions: Explant pathology with NSIP, no malignancy.  Prolonged vent wean s/p trach and PEG/J tube placement with thoracic surgery 10/29.  Code called 112 for bradycardia/asystole (required 1 round of CPR, no medications) then progressive hypotension, GI bleed as below.  S/p left chest tube placement (11/3-) for  pleural effusion/empyem s/p lytics 11/25-11/28 (CXR showing trapped left lung), right thoracentesis 11/27 (cultures negative).  Chest CT (11/22) with new LLL cavitary lesion with multifocal GGO in BUL, new 1.8 cm fluid collection with surrounding fat stranding in the left axilla, decreased bilateral loculated pleural effusions, and similar small pericardial effusion. Hypoxia resolved 12/1, trach decannulated 12/8. Per primary team, patient was taking longer to recover during therapy today, just left for a CXR. Sating mid-upper 90s on room air, no fever or complaint of cough (already on antibiotic, antifungal and anticoagulation). CXR reviewed and demonstrates stable small right effusion and left effusion with left basilar cavitary lesion.   - Nebs: levalbuterol and Mucomyst TID (increased 12/8) d/t chest tightness and dyspnea with tenacious secretions  - Pulmonary toilet with chest physiotherapy BID  - Volume management per primary team  - CXR Mondays  - CMP, Mg++, CBC qMTh     Immunosuppression: s/p induction therapy with basiliximab 10/16 (and high dose IV steroid) and 10/20  - Tacrolimus 2.5 mg qAM / 2 mg qPM (decreased 12/11).  Goal level 8-12. Level today of 8.6 at 13 hours, so therapeutic. No dose change, recheck a level on Monday, 12/20  - MMF 1000 mg BID (decreased 11/2 given GI bleed, AZA to be avoided given TPMT)  - Prednisone 10 mg BID, next taper due 1/2/22 (not yet ordered)    Date AM dose (mg) PM dose (mg)   12/5/21 10 10   1/2/22 10 7.5   1/30/22 7.5 7.5   2/27/22 7.5 5   3/27/22 5 5   4/24/22 5 2.5      Prophylaxis:   - Bactrim for PJP ppx (held 11/2-11/5 d/t BRENNAN)  - Nystatin for oral candidiasis ppx, 6 month course  - See below for serologies and viral ppx:      Donor Recipient Intervention   CMV status Negative Negative None, CMV monthly   EBV status Positive Positive None, EBV monthly    HSV status N/A Positive S/p acyclovir POD #1-30 (recent infection history pre-txp)      Positive cross  match:   Positive DSA: Note that he received two doses of rituximab in June, which is likely contributing to cross match result.  DSA newly positive 11/29 with DQB5 (mfi 2522), decreased (mfi 1873) on 12/6. DSA on 12/12, decreased further to 1703.   - Repeat DSA (12/26) every two weeks     ID: Concern for possible Strongyloides exposure pre-transplant s/p ivermectin x 1 dose (9/17). Donor and recipient cultures NGTD. S/p IV ceftazidime/vancomycin for 48h per protocol and additional empiric ceftazidime 10/19-10/23 given recurrent fevers as below. Cryptococcal Ag negative 10/29. Reports some blood mixed in sputum 12/3, persisting intermittently. Bacterial (12/5) and fungal (12/4) sputum cultures NGTD  - Monthly blood Coccidioides Ag x12 months post-transplant per ID (urine and serum negative 11/17), due 12/17     Klebsiella pneumoniae:  Pseudomonas fluorescens/putida:   LLL cavity: Klebsiella initially noted trach sputum culture 11/10. Started on ceftriaxone 11/11, transitioned to ertapenem 11/12-11/13, and back to ceftriaxone 11/14.  Bronch culture 11/12 with Klebsiella and Pseudomonas fluorescens/putida (R-meropenem).  Chest CT 11/22 with LLL cavity as above, BAL with Klebsiella pneumoniae. Histo Ag 11/22, 11/23 and Blast Ag 11/22 negative. S/p Zosyn (11/15-11/23) and levofloxacin (11/23-12/7).   - ABX: ceftazidime (11/23-12/21) per transplant ID  - Transplant ID follow up scheduled 12/21 with repeat chest CT prior      Disseminated Candida: Noted on blood cultures 10/20 and 10/22.  BDG fungitell positive (399) on 10/20.  Respiratory cultures with persistent Candida albicans.  TC 10/23 without evidence of endocarditis.  Ophthalmology consult 10/24 with benign dilated fundoscopic exam.  Candida empyema also noted 10/25, chest tubes inadvertently removed by CVTS 10/28, left chest tube replaced by IR 11/3 as above with ongoing Candida on cultures (11/3, 11/18).  BDG fungitell positive (>500) and Aspergillus  "galactomannan negative 11/22.  - Fluconazole (10/26-12/21) per transplant ID      HSV: Chronic intermittent active infection pre-transplant with recent HSV infection: crusted lesions throughout left side of jaw, s/p 10 day treatment course of ACV through 10/9.  HSV PCR blood negative 10/17.  S/p ACV ppx as above (started POD #1 instead of POD #8 given HSV history and location).     Hypogammaglobulinemia: IgG previously low at 364 (9/7).  Noted at 265 at time of transplant, s/p IVIG 10/21, repeat IgG (11/17) 198, s/p IVIG (with premedication) 11/18. Repeat IVIG on 12/15 of 365; IVIG indicated (and ordered) per protocol, but was told ARU is unable to administer IVIG and patient is unable to leave to receive it in an infusion center.  - Repeat IgG ~ 12/30      SALTY: Noted pre-transplant. Home CPAP 6-12 cm H2O.  - Evaluate need for BiPAP after trach site has healed     Hypomagnesemia: Suppressed reabsorption 2/2 CNI.  Requiring intermittent IV and PO replacement.  - Mag-Ox 800 mg qAM / 400 mg qPM (increased 12/8)      Angela Luna PA-C  [id number]     Subjective & Interval History:     Unable to interview as patient left for imaging just as I arrived. Talked to primary team and patient was more tremulous today and has some increased dyspnea/time to recovery with therapy. No fever or cough. Noted he has not been sleeping well and was wondering if fatigue is playing a role    Review of Systems:     Please see interval history, otherwise 10 point review negative    Physical Exam:     Vital signs:  Temp: 97.3  F (36.3  C) Temp src: Oral BP: 125/84 Pulse: 111   Resp: 20 SpO2: 95 % O2 Device: None (Room air)   Height: 164.6 cm (5' 4.8\") Weight: 67.9 kg (149 lb 12.8 oz)  I/O:     Intake/Output Summary (Last 24 hours) at 12/13/2021 1620  Last data filed at 12/13/2021 1310  Gross per 24 hour   Intake 720 ml   Output 1530 ml   Net -810 ml     Patient was not on the floor to examine    Lines, Drains, and Devices:  PICC Triple Lumen " 11/04/21 Left Basilic Access. PICC okay to use. (Active)   Site Assessment WDL except;Ecchymotic 12/13/21 0837   External Cath Length (cm) 2.5 cm 12/11/21 1219   Extremity Circumference (cm) 28 cm 11/28/21 1038   Dressing Intervention Chlorhexidine patch;Transparent 12/13/21 0837   Dressing Change Due 12/18/21 12/13/21 0000   PICC Comment CDI 12/13/21 0000   Gray - Status heparin locked 12/13/21 0837   [place] - Cap Change Due 12/14/21 12/13/21 0000   Red - Status heparin locked 12/13/21 0837   Red - Cap Change Due 12/14/21 12/13/21 0000   White - Status heparin locked 12/13/21 0837   White - Cap Change Due 12/14/21 12/13/21 0000   Extravasation? No 12/13/21 0000   Line Necessity Yes, meets criteria 12/13/21 0837   Number of days: 39     Data:     LABS    CMP:   Recent Labs   Lab 12/16/21  0831 12/16/21  0542 12/16/21  0210 12/15/21  2157 12/15/21  1704 12/15/21  1139 12/14/21  1142 12/14/21  1106 12/12/21  0933 12/12/21  0519 12/11/21  0819 12/11/21  0649 12/10/21  0823 12/10/21  0647   NA  --   --   --   --   --   --   --  135  --  141  --   --   --  138   POTASSIUM  --   --   --   --   --  4.4  --  5.0  --  4.5  --  4.9  --  4.7   CHLORIDE  --   --   --   --   --   --   --  101  --  108  --   --   --  103   CO2  --   --   --   --   --   --   --  25  --  26  --   --   --  27   ANIONGAP  --   --   --   --   --   --   --  9  --  7  --   --   --  8   GLC 81 184* 173* 143*   < >  --    < > 115*   < > 167*   < >  --    < > 203*   BUN  --   --   --   --   --   --   --  33*  --  34*  --   --   --  30   CR  --   --   --   --   --   --   --  0.82  --  1.00  --   --   --  0.69   GFRESTIMATED  --   --   --   --   --   --   --  >90  --  84  --   --   --  >90   ERIK  --   --   --   --   --   --   --  9.9  --  8.6  --   --   --  9.0   MAG  --   --   --   --   --   --   --   --   --  1.5*  --   --   --  1.6   PHOS  --   --   --   --   --   --   --   --   --  3.7  --  3.4  --  3.6   PROTTOTAL  --   --   --   --   --   --   --   --    --  5.8*  --   --   --  5.9*   ALBUMIN  --   --   --   --   --   --   --   --   --  2.5*  --   --   --  2.5*   BILITOTAL  --   --   --   --   --   --   --   --   --  0.2  --   --   --  0.5   ALKPHOS  --   --   --   --   --   --   --   --   --  104  --   --   --  105   AST  --   --   --   --   --   --   --   --   --  17  --   --   --  19   ALT  --   --   --   --   --   --   --   --   --  26  --   --   --  24    < > = values in this interval not displayed.     CBC:   Recent Labs   Lab 12/15/21  0647 12/14/21  1106 12/12/21  0519 12/10/21  0647   WBC 11.0 13.2* 9.7 9.0   RBC 3.52* 4.01* 3.30* 3.15*   HGB 10.2* 11.5* 9.8* 9.3*   HCT 31.7* 36.2* 30.6* 29.2*   MCV 90 90 93 93   MCH 29.0 28.7 29.7 29.5   MCHC 32.2 31.8 32.0 31.8   RDW 14.2 13.8 14.2 14.3    218 144* 141*       INR:   Recent Labs   Lab 12/16/21  0707 12/15/21  0647 12/14/21  1106 12/13/21  0539   INR 2.64* 2.41* 2.25* 2.57*       Glucose:   Recent Labs   Lab 12/16/21  0831 12/16/21  0542 12/16/21  0210 12/15/21  2157 12/15/21  1704 12/15/21  1137   GLC 81 184* 173* 143* 170* 104*       Blood Gas: No lab results found in last 7 days.    Culture Data No results for input(s): CULT in the last 168 hours.    Virology Data:   Lab Results   Component Value Date    FLUAH1 Negative 11/22/2021    FLUAH3 Negative 11/22/2021    EC4929 Negative 11/22/2021    IFLUB Negative 11/22/2021    RSVA Negative 11/22/2021    RSVB Negative 11/22/2021    PIV1 Negative 11/22/2021    PIV2 Negative 11/22/2021    PIV3 Negative 11/22/2021    HMPV Negative 11/22/2021    HRVS Negative 11/22/2021    ADVBE Negative 11/22/2021    ADVC Negative 11/22/2021    ADVC Negative 11/12/2021    ADVC Negative 10/17/2021       Historical CMV results (last 3 of prior testing):  Lab Results   Component Value Date    CMVQNT Not Detected 12/15/2021    CMVQNT Not Detected 11/22/2021    CMVQNT Not Detected 11/16/2021     No results found for: CMVLOG    Urine Studies    Recent Labs   Lab Test  11/01/21  1336 10/25/21  1507   URINEPH 5.5 5.5   NITRITE Negative Negative   LEUKEST Negative Negative   WBCU 0 2       Most Recent [facility] Pulmonary Function Testing (FVC/FEV1 only)  No results found for: 20002  No results found for: 20003  No results found for: 20015  No results found for: 20016    IMAGING    No results found for this or any previous visit (from the past 48 hour(s)).

## 2021-12-16 NOTE — PLAN OF CARE
Discharge Planner Post-Acute Rehab SLP:     Discharge Plan: [facility] with fiance     Precautions: aspiration precautions, sternal, fall    Current Status:  Communication: WFL. Mildly breathy, trach stoma covered.  Cognition: Mild-moderate cognitive impairments with deficits re: attention, exeuctive function, memory, and visuospatial skills.  Swallow: Regular, thin liquid. Medications to be given orally with thin liquids. Will continue to monitor.     Assessment: Pt seen with meal over lunch with regular texture, thin liquid (straw). Pt with moderate difficulty self feeding d/t shaking but does not interfer with intake. Pt with timely mastication, AP transit, and limited oral residuals. Pt 02 remained WFL during all PO with mild drop single instance but otherwise WFL. Pt tolerated PO without overt s/sx aspiration. Continues to benefit from set up assist. Will continue to monitor tolerance of liquids from distance but overall do feel pt appropriate to sign off dysphagia goals.    Pt seen at bedside for cognitive linguistic assessment. Completed CLQT, see report for breakdown of scores. Overall, pt scored mild cognitive impairments with moderate deficits re: attention, exeuctive function, and memory. Pt visuospatial skills also impacted and moderately impaired. Pt with mild visual impairments likely impacting overall function. IND PLOF included working (), managing finances, appts, and medication. Pt presenting with below baseline level of function and would benefit from skilled speech therapy to treat cognitive impairments through structural and functional tasks.      Cognitive-Linguistic Quick Test results    Cognitive Domain  Severity Rating  Attention        mod  Memory        mild  Executive Functions       mod  Language        WFL  Visuospatial Skills       mod    Composite Severity Rating      mild    Clock Drawing Severity Rating    mod            Other Barriers to Discharge (Family Training,  etc):

## 2021-12-16 NOTE — PROGRESS NOTES
[facility]    Progress Note - TCU Service        Date of Admission:  12/13/2021    Assessment & Plan             Mr. 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      # ILD and NSIP s/p BSLT on 10/16/2021  # Acute on chronic hypoxic respiratory failure s/p tracheostomy on 10/29/21 and decannulation on 12/8/2021  # Bilateral pleural effusions, improved  - Transplant pulmonology consulted, appreciate recs  - Continue tacrolimus, MMF, prednisone  - Continue bactrim 400-80 mg daily, nystatin QID  - DSA every 2 weeks   - Levalbuterol and mucomyst QID and pulm toilet and chest PT QID  - PT/OT/SLP   - Continue 40 mg of Lasix daily  - Change oxycodone to TID PRN  - Scheduled tylenol  - Tacrolimus level on 12/16 (ordered)  - CBC, BMP weekly      # Left lung cavitary lesion   # Klebsiella pneumoniae and Pseudomonas fluorescens/putida HAP  aspiration vs pseudomonal vs Klebsiella pneumonia related abscess. Indeterminate Quant Gold, but negative tuberculin skin tests on mulitple occurences. S/p BAL on 11/22.Cocci antigen in urine negative, serum, BAL histoplasmosis negative, CRAG negative, respiratory viral panel (-), TB (-), candida growth from bal culture.  - Transplant ID consulted, appreciate recs  - IV Ceftazdime 2 grams Q8H (until 12/21)  - Follow up CT chest in 4 weeks on 12/21  - Please include in the patient's discharge instructions, follow up with Dr. Abebe on 12/21/2021 @6:30 PM in a virtual visit.       # Invasive candidiasis with Candida albicans  Positive candida BCx 10/20 and  10/22.  BDG fungitell positive (399) on 10/20. Sputum Cx + Candida albicans persistently.  TC 10/23 without evidence of endocarditis. Ophthalmology consult 10/24 with benign dilated fundoscopic exam.  Candida empyema improved after chest tube drainage. Repeat pleural fungal cultures and fungal/bacterial blood cultures negative.   - Continue Fluconazole 400 mg IV daily     # Hypogammaglobinemia  Received IVIG. IgG levels yesterday 365.   - Appreciate lung transplant team recs    # Encephalopathy, resolved  # Difficulty sleeping  Likely multi-factorial in the setting of stroke, prolonged critical illness.  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 MRI brain on 10/23 with multifocal subacute infarct within both cerebral hemispheres and left cerebellum. Presumed embolic with afib hx, identified while inpatient. He is currently able to ambulate with relative ease.  - Continue warfarin per pharmacy protocol      # Right subclavian DVT   Diagnosed on 11/4 on ultrasound.  - Continue warfarin per pharmacy protocol     # DM2 with steroid induced hyperglycemia  Hemoglobin A1c 6.6 pre-operatively. Endocrine recently consulted for steroids induced hyperglycemia.   - Continue glargine 15 IU qAM  - Continue NPH 45 IU qHS  - Novolog High Sliding scale insulin QID with meals  -Blood glucose QID with meals     # Severe malnutrition in the context of acute on chronic illness  Patient currently has a PEG J-tube and is getting the Jorde of nutrition through cycle tube feeds.  -Multivitamin, folic acid, B6, B12 supplements   - TF per nutrition; continue oral diet as well     # Anxiety and depression   Psychiatry reconsulted and after discussion suggest increasing lexapro. Will discuss with patient regarding starting  ritalin in the AM for motivation.  - Lexapro increased to 10 mg daily could increase to 20 in a week ~12/12.  -As needed hydroxyzine.     # Concern for chipped tooth  # Poor dental hygiene  Patient noted he feels like he may have chipped his tooth or has a loose tooth after eating guevara. It is not painful.   - Dental hygiene consult placed and chipped tooth stable rec oral hygiene cares.     # Rheumatoid arthritis- Dx 5/2021 with + CCP antibody and RF. Previously treated with rituximab. Rheum consulted early in admission. On steroids as noted above.   # SALTY - Uses CPAP at bedtime prior to admission.  # HTN- Continue PTA amlodipine.   # Hypothyroidism - TSH 3.17 on 11/19/21. Continue PTA levothyroxine 25 mcg daily.     # Recurrent GIB - hgb drop on 10/22 s/p 2 unit pRBC transfusion with EGD on 10/23 with NJ/OG tube trauma with scant oozing. Patient then developed progressive hypotension and ultimately CODE BLUE for GIB in setting of anticoagulation with heparin drip, s/p massive transfusion protocol. EGD on 11/2 with large amount of clotted blood in stomach and area of raised mucosa with small adherent clot near PEG tube site that was clipped, no active bleeding.  Maroon stools 11/3, repeat EGD with extensive old blood in stomach, no active bleeding, small nodular area with prior clips clipped again.  Most recent EGD 11/5 with ulcer noted at PEG tube bumper site, gastritis, and suction marks from G tube. TF noted in G tube drainage bag 11/7, AXR with GJ tube tip projecting over proximal duodenum. GJ tube exchanged 11/9 by GI.  - PO PPI BID    .      Diet: Adult Formula Drip Feeding: Continuous Vital 1.5; Jejunostomy; Goal Rate: 80 ml/hr x12 hrs from 6 PM to 6 AM (1 L per night); mL/hr; From: 6:00 PM; 6:00 AM; Medication - Feeding Tube Flush Frequency: At least 15-30 mL water before and after medica...  Calorie Counts  Snacks/Supplements Adult: Other; Ensure Clear or Gelatein PRN; With Meals  Room Service  2 Gram K  Diet    DVT Prophylaxis: Warfarin  Watson Catheter: Not present  Fluids: None  Central Lines: None  Code Status: Full Code      Disposition Plan   Expected Discharge: 12/30/2021     Anticipated discharge location:  Awaiting care coordination huddle       The patient's care was discussed with the Attending Physician, Dr. Vinson.    Sherri Layne MD  TCU [facility]  Securely message with the Vocera Web Console (learn more here)  Text page via [employer] Paging/Directory        Clinically Significant Risk Factors Present on Admission           # Severe Malnutrition, POA: based on Weight loss;Subcutaneous fat loss;Muscle loss (12/14/21 1700)     ______________________________________________________________________    Interval History   No acute events overnight. Yesterday's notes reviewed AM. Bret complains that he has bowel incontinence with loose stools but no true diarrhea. He reports it remains unchanged over the past days. His breathing is fine at rest, however he endorses significant shortness of breath on exertion. He was having breakfast and is eager to work with PT.    Data reviewed today: I reviewed all medications, new labs and imaging results over the last 24 hours. I personally reviewed no images or EKG's today.    Physical Exam   Vital Signs: Temp: 97.3  F (36.3  C) Temp src: Oral BP: 125/84 Pulse: 111   Resp: 20 SpO2: 95 % O2 Device: None (Room air)    Weight: 149 lbs 12.8 oz  General Appearance: Lying on bed, NAD, answers questions appropriately  Respiratory: Tracheostomy incision healing. CTAB, no rales, murnurs or wheezing.  Cardiovascular: RRR, S1, S2, no mrg  GI: PEG tube intact in place, BS +, non distended, non tender, no HSM  Skin: MEMO (-), no rash appreciated  Neuro: AAOX4, CN II-XII grossly intact. Fasciculations on facial muscles and tongue. Significant resting tremor on both upper extremities. Strength is 4- on knee extension  bilaterally, 4- on RUE, 3+ on LUE, 4+ elbow flexion,  slightly weak, mild atrophy appreciated on dorsal hands bilaterally. Sensation intact throughout the body, the patient endorses tingling sensation on hands and feet bilaterally.     Data   Recent Labs   Lab 12/16/21  0831 12/16/21  0707 12/16/21  0542 12/16/21  0210 12/15/21  1704 12/15/21  1139 12/15/21  0739 12/15/21  0647 12/14/21  1142 12/14/21  1106 12/12/21  0933 12/12/21  0519 12/10/21  0823 12/10/21  0647   WBC  --   --   --   --   --   --   --  11.0  --  13.2*  --  9.7  --  9.0   HGB  --   --   --   --   --   --   --  10.2*  --  11.5*  --  9.8*  --  9.3*   MCV  --   --   --   --   --   --   --  90  --  90  --  93  --  93   PLT  --   --   --   --   --   --   --  193  --  218  --  144*  --  141*   INR  --  2.64*  --   --   --   --   --  2.41*  --  2.25*   < > 2.52*   < > 2.18*   NA  --   --   --   --   --   --   --   --   --  135  --  141  --  138   POTASSIUM  --   --   --   --   --  4.4  --   --   --  5.0  --  4.5   < > 4.7   CHLORIDE  --   --   --   --   --   --   --   --   --  101  --  108  --  103   CO2  --   --   --   --   --   --   --   --   --  25  --  26  --  27   BUN  --   --   --   --   --   --   --   --   --  33*  --  34*  --  30   CR  --   --   --   --   --   --   --   --   --  0.82  --  1.00  --  0.69   ANIONGAP  --   --   --   --   --   --   --   --   --  9  --  7  --  8   ERIK  --   --   --   --   --   --   --   --   --  9.9  --  8.6  --  9.0   GLC 81  --  184* 173*   < >  --    < >  --    < > 115*   < > 167*   < > 203*   ALBUMIN  --   --   --   --   --   --   --   --   --   --   --  2.5*  --  2.5*   PROTTOTAL  --   --   --   --   --   --   --   --   --   --   --  5.8*  --  5.9*   BILITOTAL  --   --   --   --   --   --   --   --   --   --   --  0.2  --  0.5   ALKPHOS  --   --   --   --   --   --   --   --   --   --   --  104  --  105   ALT  --   --   --   --   --   --   --   --   --   --   --  26  --  24   AST  --   --   --   --   --   --    --   --   --   --   --  17  --  19    < > = values in this interval not displayed.

## 2021-12-16 NOTE — PLAN OF CARE
Discharge Planner Post-Acute Rehab OT:     Discharge Plan: to [facility] with fiance    Precautions: sternal, fall    Current Status:  ADLs:    Mobility: CGA for in room mobility using walker    Grooming: CGA standing at the sink, chair behind for intermittent rest breaks    Dressing: TBD UB dsg (unable d/t nsg request leave IV running on 12/15), Mod A LB dsg    Bathing: Min A w/shower chair    Toileting: CGA for transfer using walker  IADLs: fiance will assist  Vision/Cognition: L visual deficit at baseline and new right eye blind spot. Pt scored 24/30 on the SLUMS indicating mild deficit    Assessment: Pt seemed more SOB with activity and with more tremors today.     Other Barriers to Discharge (DME, Family Training, etc): poor activity endurance

## 2021-12-16 NOTE — PLAN OF CARE
FOCUS/GOAL  Medical management    ASSESSMENT, INTERVENTIONS AND CONTINUING PLAN FOR GOAL:  Awake at shift change watching TV, patient had a good night of sleep. Cycled enteral feeding infused per order and completed .PEG successfully flushed at the end of feeding. PICC patent all 3 lumens. Blood return noted. Denied pain, Declined 0400 Tylenol.  Voided using urinal. No BM reported this shift. BG were 173 and 184.  Will continue with POC

## 2021-12-16 NOTE — PROGRESS NOTES
CLINICAL NUTRITION SERVICES - BRIEF NOTE     Nutrition Prescription      Recommendations already ordered by Registered Dietitian (RD):  RD will adjust J-TF formula to Novasource Renal at 80 ml/hr x12 hrs from 6 pm to 6 am (1 L per night) with 100 ml water flush q 4 hrs to provide 2000 kcals, 90 gm protein, 183 gm CHO, no fiber, 945 mg K+ or 24 mEq, 1317 ml water/day    --It is noted that change to this formula with current rate and run time will actually be giving more kcals/protein than previous. RD plans to re-assess TF plan of care with pt and Providers on Friday. This afternoon rounding Resident requested formula change to lower K+ formula, and would like to assess if pt may benefit from a transition to bolus regimen, will plan to re-look at this tomorrow and make further adjustments to TF to attempt to encourage po intakes and wean pt from TF in conjunction with updating Endocrinology for insulin adjustments, TF plan chosen as above to match prior CHO content for current insulin regimen.     FYI to Endocrinology: please note formula change to Novasource Renal as above from Vital 1.5 is only 4 gm CHO difference (187 gm CHO prior to now 183 gm CHO)     Future/Additional Recommendations:  Follow up with pt/Endocrinology/Resident on further adjustment needs to TF     NEW FINDINGS   RD spoke with rounding Resident regarding labs and possible need to change TF to lower K+ formula, resident followed back up with RD later this afternoon and did request RD make TF changes, discussed plan to match CHO as closely as able in change to not have to make significant changes to insulin plan of care today, Resident agreeing, also discussed consideration of bolus regimen as well, RD will plan to discuss this with pt tomorrow, reviewed pt not eating too much at present via calorie count documentation.     Results for VENKATESH GUERRIER (MRN [id number]) as of 12/16/2021 16:31   12/12/2021 05:19 12/14/2021 11:06 12/15/2021 11:39  12/16/2021 07:05   Potassium 4.5 5.0 4.4 4.8     INTERVENTIONS  Implementation  Collaboration with other providers - as discussed above   Enteral Nutrition - Formula change for continued elevated K+ levels   Feeding tube flush - continue as ordered     Monitoring/Evaluation  Will continue to monitor and evaluate per protocol.    Zenaida Washington RD, CNSC, LD  [facility] RD pager: [phone]  Weekend/holiday pager: [phone]

## 2021-12-16 NOTE — PROGRESS NOTES
Calorie Count:  12/15: 558 kcal and 13 g protein (1 meal - pt only ordered lunch meal, 0 supplements)    Zenaida Washington RD, CNSC, LD  [facility] RD pager: [phone]  Weekend/holiday pager: [phone]

## 2021-12-16 NOTE — PROGRESS NOTES
"   12/16/21 1100   General Information   Onset of Illness/Injury or Date of Surgery 09/05/21   Referring Physician  Johnny Coronado MD   Pertinent History of Current Problem per H&P:\"56-year-old gentleman with a past medical history of RA, interstitial lung disease, sleep apnea, hypertension, hyperlipidemia, hypogammaglobulinemia, anxiety, and depression who was transferred to [facility] from an outside hospital on 9/5/2021 with worsening respiratory failure and evaluation of lung transplant.  \"   General Observations Pt seen by SLP during prolonged hospitalization for dysphagia. VFSS completed 11/26 recommending regular texture, thin liquid. Last seen by SLP 12/1 with trach site capped and noted tolerating current diet. Cracked tooth on regular texture and was changed to IDD 7. Pt reported disinterest in softer foods and was switched back. SLP to determine if appropriate for regular texture.   Disability/Function   Wear Glasses or Blind yes   Vision Management glasses   Concentrating, Remembering or Making Decisions Difficulty yes   Concentration Management st memory   Pain Assessment   Patient Currently in Pain No   Type of Evaluation   Type of Evaluation Speech, Language, Cognition   Auditory Comprehension   Follows Commands (Auditory Comprehension) WNL   Verbal Expression   Confrontational Naming (Verbal Expression) WNL   Cognition   Cognitive Status mild-mod cognitive linguistic impairment    Cognitive Function attention deficit;executive function deficit;memory deficit   Additional cognitive-linguistic evaluation indicated  Please see separate report for results   Attention Deficit (Cognition) moderate deficit   Executive Function Deficit (Cognition) moderate deficit   Memory Deficit (Cognition) minimal deficit   General Therapy Interventions   Planned Therapy Interventions Cognitive Treatment   Cognitive treatment Internal memory strategy training;External memory strategy training;Progressive attention " training   SLP Therapy Assessment/Plan   Criteria for Skilled Therapeutic Interventions Met (SLP Eval) yes   SLP Diagnosis mild-mod cognitive linguistic impairment    Rehab Potential (SLP Eval) good, to achieve stated therapy goals   Therapy Frequency (SLP Eval) daily   Predicted Duration of Therapy Intervention (SLP Eval) 3 weeks   Comment, Therapy Assessment/Plan (SLP) Pt seen at bedside for cognitive linguistic assessment. Completed CLQT, see report for breakdown of scores. Overall, pt scored mild cognitive impairments with moderate deficits re: attention, exeuctive function, and memory. Pt visuospatial skills also impacted and moderately impaired. Pt with mild visual impairments likely impacting overall function. IND PLOF included working (), managing finances, appts, and medication. Pt presenting with below baseline level of function and would benefit from skilled speech therapy to treat cognitive impairments through structural and functional tasks.    Therapy Plan Review/Discharge Plan (SLP)   Therapy Plan Review (SLP) evaluation/treatment results reviewed;care plan/treatment goals reviewed;risks/benefits reviewed;participants voiced agreement with care plan   SLP Discharge Planning    SLP Discharge Recommendation (DC Rec) home with home care speech therapy;home with outpatient speech therapy;Transitional Care Facility    Total Evaluation Time   Total Evaluation Time (Minutes) 30  (cog)

## 2021-12-16 NOTE — PROGRESS NOTES
[facility]   Acute Rehabilitation Unit  Daily progress note    INTERVAL HISTORY  No acute events overnight.  Per nursing, good night of sleep. Has been feeling increasingly tremulous and short of breath today with minimal activity. He feels he need to take longer, more frequent rest breaks  Sats remain >95% despite subjective dyspnea. Continues to have mild L sided chest pain at the site of his old chest tube. Discuss he has AM labs pending and a chest XR today that will offer us more information.  Functionally he is CGA for grooming, Mod A LBD, Min A bathing, ambualting 15 ft with a WW, transfers with CGA, and bed mobility with Min A. Remains cleared for regular diet/thin liquids    MEDICATIONS  Scheduled meds    acetaminophen  975 mg Oral or Feeding Tube Q8H     acetylcysteine  2 mL Nebulization TID     amLODIPine  5 mg Oral Daily     calcium carbonate 600 mg-vitamin D 400 units  1 tablet Oral or Feeding Tube BID w/meals     cefTAZidime  2 g Intravenous Q8H     escitalopram  20 mg Oral or Feeding Tube Daily     fiber modular (NUTRISOURCE FIBER)  1 packet Per Feeding Tube BID     fluconazole  400 mg Intravenous Q24H     fluticasone  1 spray Both Nostrils Daily     folic acid-vit B6-vit B12  1 tablet Oral Daily     furosemide  40 mg Oral Daily     insulin aspart  1-12 Units Subcutaneous 5 times per day     insulin aspart   Subcutaneous TID w/meals     insulin glargine  15 Units Subcutaneous QAM AC     insulin NPH  45 Units Subcutaneous Q24H     levalbuterol  1.25 mg Nebulization TID     levothyroxine  25 mcg Oral Daily     Lidocaine  2 patch Transdermal Q24H     lidocaine   Transdermal Q8H     magnesium oxide  400 mg Oral QPM     magnesium oxide  800 mg Oral QAM     multivitamin w/minerals  1 tablet Oral Daily     mycophenolate  1,000 mg Oral BID     nystatin  1,000,000 Units Swish & Swallow 4x Daily     pantoprazole  40 mg Oral BID AC     predniSONE  10 mg Oral or Feeding Tube  "BID     QUEtiapine  50 mg Oral At Bedtime     rosuvastatin  10 mg Oral or Feeding Tube Daily     sodium chloride (PF)  10-40 mL Intracatheter Q8H     sulfamethoxazole-trimethoprim  1 tablet Oral or Feeding Tube Daily     tacrolimus  2 mg Oral QPM     tacrolimus  2.5 mg Oral QAM       PRN meds:  albuterol, bisacodyl, dextrose, glucose **OR** dextrose **OR** glucagon, hydrOXYzine, insulin aspart, lidocaine (viscous), magnesium hydroxide, melatonin, menthol, naloxone **OR** naloxone **OR** naloxone **OR** naloxone, ondansetron, oxyCODONE, - MEDICATION INSTRUCTIONS -, prochlorperazine, QUEtiapine, senna-docusate, sodium chloride (PF), Warfarin Therapy Reminder, zinc-white petrolatum      PHYSICAL EXAM  /84 (BP Location: Right arm)   Pulse 97   Temp 97.3  F (36.3  C) (Oral)   Resp 20   Ht 1.646 m (5' 4.8\")   Wt 67.9 kg (149 lb 12.8 oz)   SpO2 100%   BMI 25.08 kg/m    Gen: Awake, coooperative  HEENT: atraumatic, no discharge from nares or ears, EOM intact  Cardio: Borderline tachycardic, no murmur auscultated  Pulm: Non-labored breathing, CTAB with diminished sounds at R lung base  Abd: Soft, non-tender to palpation, bowel sounds present. PEG c/d/i  Ext: No edema in lower or upper extremities b/l, no calf tenderness  Neuro/MSK: alert, oriented, no new slurring of words, answers questions appropriately, follows commands. Increased tremor from prior      LABS  Recent Labs   Lab 12/16/21  0707 12/16/21  0542 12/16/21  0210 12/15/21  2157 12/15/21  1704 12/15/21  1139 12/15/21  0739 12/15/21  0647 12/14/21  1142 12/14/21  1106 12/12/21  0933 12/12/21  0519 12/10/21  0823 12/10/21  0647   WBC  --   --   --   --   --   --   --  11.0  --  13.2*  --  9.7  --  9.0   HGB  --   --   --   --   --   --   --  10.2*  --  11.5*  --  9.8*  --  9.3*   MCV  --   --   --   --   --   --   --  90  --  90  --  93  --  93   PLT  --   --   --   --   --   --   --  193  --  218  --  144*  --  141*   INR 2.64*  --   --   --   --   --   " --  2.41*  --  2.25*   < > 2.52*   < > 2.18*   NA  --   --   --   --   --   --   --   --   --  135  --  141  --  138   POTASSIUM  --   --   --   --   --  4.4  --   --   --  5.0  --  4.5   < > 4.7   CHLORIDE  --   --   --   --   --   --   --   --   --  101  --  108  --  103   CO2  --   --   --   --   --   --   --   --   --  25  --  26  --  27   BUN  --   --   --   --   --   --   --   --   --  33*  --  34*  --  30   CR  --   --   --   --   --   --   --   --   --  0.82  --  1.00  --  0.69   ANIONGAP  --   --   --   --   --   --   --   --   --  9  --  7  --  8   ERIK  --   --   --   --   --   --   --   --   --  9.9  --  8.6  --  9.0   GLC  --  184* 173* 143*   < >  --    < >  --    < > 115*   < > 167*   < > 203*   ALBUMIN  --   --   --   --   --   --   --   --   --   --   --  2.5*  --  2.5*   PROTTOTAL  --   --   --   --   --   --   --   --   --   --   --  5.8*  --  5.9*   BILITOTAL  --   --   --   --   --   --   --   --   --   --   --  0.2  --  0.5   ALKPHOS  --   --   --   --   --   --   --   --   --   --   --  104  --  105   ALT  --   --   --   --   --   --   --   --   --   --   --  26  --  24   AST  --   --   --   --   --   --   --   --   --   --   --  17  --  19    < > = values in this interval not displayed.     No results found for this or any previous visit (from the past 24 hour(s)).    ASSESSMENT AND PLAN    Edson Thornton is a 56 year old right hand dominant male with a hx of NSIP/ILD associated with rheumatoid lung disease, hypertension, hyperlipidemia, RA who initially presented 9/5/2021 with acute on chronic respiratory failure for transplant work-up s/p bilateral lung transplant (10/16/21) with prolonged hospitalization associated with multifocal bilateral acute/subacute ischemic infarcts of bilateral frontal, occipital lobes.  Postoperative course complicated by prolonged ventilator wean s/p trach and PEG placement (10/29), encephalopathy, new Afib with RVR, BRENNAN, candidemia/Candida empyema, and brief  bradycardic arrest with subsequent hypotension in the setting of GI bleed.  [facility] and was decannulated 12/8/2021.  He is currently stable on room air at admission to acute rehab.  He presents with right upper extremity weakness, bilateral dysmetria/tremors, acute on chronic severe deconditioning, dyspnea on exertion, and impairment of ADLs and gait.     Admission to acute inpatient rehab 12/13/2021  .    Impairment group code: Stroke Ischemic 01.3 Bilateral Involvement: embolic CVA affecting B cerebral hemispheres and L cerebellum, as well as s/p bilateral lung transplantation     1. PT, OT 90 minutes of each on a daily basis, in addition to rehab nursing and close management of physiatrist.   2. Impairment of ADLs/IADLs:  OT for 60 min daily to work on upper and lower body self care, dressing, toileting, bathing, energy conservation techniques with use of ADs as needed.   3. Impairment of mobility:   PT for 60 min daily to work on gait exercises, strengthening, endurance buildup, transfers with use of walker as needed.   4. Impairment of cognition:  SLP for 60 minutes for cognitive evaluation and treatment strategies for higher level cognitive deficits, and memory impairment.  5. Rehab RN to administer medication, patient education on medication taking, VS monitoring, bowel regimen, glucose monitoring and wound care/surgical wound dressing changes and monitoring.   6. Medical Conditions - Hospital ist team consulted, appreciate assistance     Neuro/Psych  #Multifocal acute to subacute ischemic stroke, etiology likely embolic vs perioperative  Initially noted 10/22 and 11/6 with stroke code called for change in exam.  MRI brain 10/23 with infarcts noted in both cerebral hemispheres (R frontal, L corona radiata, bilateral occipital), left cerebellum, presumed associated with new Afib vs perioperative. Bilateral upper extremity paresis (R>L), suspicion for some cortical  blindness  - Stroke risk factor management:              -Warfarin anticoagulation for Afib, pharmacy to dose              -BP goals: SBP<140              - Rosuvastatin 10 mg qday              -Not smoking              -Hgb A1c: 6.6     #Pain  -Acetaminophen 975mg q8h  -Lidocaine patch  -Menthol patch     #Encephalopathy  #Sleep  -Delirium precautions  -Seroquel 50 mg at bedtime, 25mg TID prn; scheduled 25mg discontinued 12/14, continue to wean as able  -Melatonin 10 nightly     #Mood  #History of anxiety and depression  - PTA Lexapro increased to 10 mg while in acute hospital, increased to 20mg 12/14  -Hydroxyzine as needed         Pulmonary  #ILD, NISP, s/p bilateral lung transplant (10/16/2021)  #Acute on chronic respiratory failure s/p tracheostomy (10/29/21) and decannulation (12/8/21)  #Bilateral Pleural Effusions  -Sternal precautions until 12 wks post op (1/8/2022)  -Immunosuppression:              -s/p basiliximab on 10/16, 10/20              -Continue tacrolimus 2.5mg qam and 2mg qhs, goal 8-12              -Continue MMF 1000mg BID              -Continue prednisone 10mg BID  -Monitoring lab/imaging              -DSA q2wks              -Monthly Coccidioides Ag due 12/17              -CMV, EBV from 12/15 pending.              -CXR, CBC, BMP, Tac level M/Th  -Continue Levalbuterol and mucomyst nebs QID  -Aggressive pulmonary toilet and chest PT QID  -Continue Lasix 40mg qday     #SALTY  -PTA CPAP     ID  #Immunosuppression Prophylaxsis   -Bactrim x6 months  -Nystatin x6 months     #L lung cavitary lesion  #Hospital-acquired pneumonia with Klebsiella pneumoniae, Pseudomonas fluorescens/putida  Suspected aspiration versus pseudomonal vs Klebsiella pneumonia related to abscess.  CT on 1025 with left lower lobe nodular opacity with repeat on 11/22 showing new cavitary lesion in left lung base, multifocal bilateral upper lobe groundglass opacity, and new 1.8 cm fluid collection with surrounding fat stranding in  left axilla, decreased bilateral loculated pleural effusions.  QuantiFERON gold indeterminate, negative tuberculin skin test on multiple occurrences.  Underwent BAL on 11/22 with cultures growing Klebsiella and Pseudomonas fluorescens/putida, resistant to meropenem.  ID was consulted and feels this is likely fungal.  B-D glucan positive with known candidemia.  Cocci antigen in urine negative, serum histoplasmosis negative, CRAG negative  -IV ceftazidime 2g q8hrs (end 12/21)  -Follow-up chest CT 12/21 and will discuss with ID/Dr. Abebe after     #Invasive Candida albicans candidiasis  Positive Candida blood cultures 10/20, 10/22.  Fungitell positive (399).  TC 10/23 without evidence of endocarditis. Ophthalmic consulted 10/24, benign funduscopic exam.  Candida empyema 10/25.  Repeated pleural effusion fungal cultures and fungal/bacterial blood cultures have been without growth since 11/18.  Initially on micafungin (10/22-10/27) before transition to fluconazole IV  -Continue IV Fluconazole 400mg daily (10/26-12/21)     #hx of HSV  Chronic intermittent infection pretransplant, with recent HSV infection while in acute hospital (crusted lesions along left jaw) s/p 10d treatment until 10/9.     Cardiovascular  #Afib  Initially noted 10/18, converted to NSR with amio ggt. Metoprolol and amiodarone discontinued due to bradycardia. INR 2.64 12/16  -Warfarin as dosed by pharmacy     #Hypertension  -PTA amlodipine 5 mg daily     Heme  #Leukocytosis  New leukocytosis to 13.2 on 12/14, but improved to 11.0 today.  No new infectious symptoms at this time.  Continue to monitor CBCs and for signs/symptoms of infection    #R subclavian DVT  -Warfarin anticoagulation, pharmacy to dose     #Hypogammaglobulinemia  S/p IVIG  -Repeat IgG 12/15 365     Endocrine  #Type 2 diabetes  #Steroid-induced hyperglycemia  -Endocrine consulted while in acute hospital. Reconsulted 12/14 with anticipation of transitioning TF, appreciate recs                  -NPH 30 units increase to 45 units tonight at start of TF (due to reduced glargine in circulation)                 -reduced AM Lantus to 15 units                 -discontinue PM Lantus                 -Novolog high resistance sliding scale Q 4 hours -- this will continue tonight, but then will remove Q4 overnight for pt sleep if it is safe                 -start 1:15g CHO with meals, snacks,                  -BG monitoring Q 4 hours                 -hypoglycemia protocol                 -diabetes education needs will be assessed closer to discharge, when TF plan solidified                 -on discharge, will recommend outpatient follow up with [facility] service or endocrinology closer to home in [place], [place]        #Hypothyroidism  TSH 3.17 on 11/19  -Continue levothyroxine 25 mcg daily     Renal  #BRENNAN, resolved  Cr 0.82 12/14  -Monitor intermittently     MSK/Rheum  #Rheumatoid arthritis  Previously treated with rituximab.  Rheumatology familiar and consulted early in admission.  -Steroids as above     GI/FEN  #Severe malnutrition  Patient currently has PEG in place, receiving majority of nutrition through cycled tube feeds  -Continue multivitamin, folate, B6, B12 supplements  -TF per dietician, appreciate assistance.  Currently cycled  -Encourage p.o. intake     #hx of GI Bleed  Patient with progressive hypotension and CODE BLUE for bradycardia/asystole associated with GI bleed in the setting of anticoagulation, requiring massive transfusion protocol on 10/22.  EGD on 11/2 with clotted blood in stomach, adherent clot near PEG site that was clipped without active bleeding.  Most recent EGD 11/5 with ulcer noted near the PEG bumper site, gastritis, suction marks from the G-tube.  GJ tube was exchanged 11/9 by GI. Hgb 10.2 12/15  -Monitor hemoglobin intermittently.  -Continue PPI BID      Bowel/Bladder  Bowel, continent  Constipation  - Senna-docusate 1-2 tablets BID  - Miralax QDAY PRN  -  Bisacodyl suppository 10mg QDAY PRN     Bladder, continent     1. Adjustment to disability:  Clinical psychology to eval and treat as indicated  2. FEN: Regular with thin liquids, cycled TF  3. Bowel: continent, meds as above  4. Bladder: continent  5. DVT Prophylaxis: warfarin  6. GI Prophylaxis: PPI  7. Code: full  8. Disposition: Local housing - [facility]  9. ELOS:  2-3 weeks  10. Rehab prognosis:  good  9. Follow up Appointments on Discharge:                -Neurology 1-2 months after discharge               -Outpatient cardiac/pulmonary Rehab               -Transplant Coordinator               -ID: Follow up with Dr. Abebe on 12/21/2021 (likely need to reschedule)     Mo Austin MD  PGY-2  Physical Medicine & Rehabilitation

## 2021-12-17 ENCOUNTER — APPOINTMENT (OUTPATIENT)
Dept: SPEECH THERAPY | Facility: CLINIC | Age: 56
End: 2021-12-17
Attending: PHYSICAL MEDICINE & REHABILITATION
Payer: COMMERCIAL

## 2021-12-17 ENCOUNTER — APPOINTMENT (OUTPATIENT)
Dept: OCCUPATIONAL THERAPY | Facility: CLINIC | Age: 56
End: 2021-12-17
Attending: PHYSICAL MEDICINE & REHABILITATION
Payer: COMMERCIAL

## 2021-12-17 ENCOUNTER — APPOINTMENT (OUTPATIENT)
Dept: PHYSICAL THERAPY | Facility: CLINIC | Age: 56
End: 2021-12-17
Attending: PHYSICAL MEDICINE & REHABILITATION
Payer: COMMERCIAL

## 2021-12-17 LAB
BASOPHILS # BLD AUTO: 0 10E3/UL (ref 0–0.2)
BASOPHILS NFR BLD AUTO: 0 %
EOSINOPHIL # BLD AUTO: 0.1 10E3/UL (ref 0–0.7)
EOSINOPHIL NFR BLD AUTO: 1 %
ERYTHROCYTE [DISTWIDTH] IN BLOOD BY AUTOMATED COUNT: 14 % (ref 10–15)
GLUCOSE BLDC GLUCOMTR-MCNC: 107 MG/DL (ref 70–99)
GLUCOSE BLDC GLUCOMTR-MCNC: 123 MG/DL (ref 70–99)
GLUCOSE BLDC GLUCOMTR-MCNC: 123 MG/DL (ref 70–99)
GLUCOSE BLDC GLUCOMTR-MCNC: 154 MG/DL (ref 70–99)
GLUCOSE BLDC GLUCOMTR-MCNC: 96 MG/DL (ref 70–99)
HCT VFR BLD AUTO: 31.2 % (ref 40–53)
HGB BLD-MCNC: 9.9 G/DL (ref 13.3–17.7)
IMM GRANULOCYTES # BLD: 0.2 10E3/UL
IMM GRANULOCYTES NFR BLD: 2 %
INR PPP: 2.77 (ref 0.85–1.15)
LYMPHOCYTES # BLD AUTO: 0.9 10E3/UL (ref 0.8–5.3)
LYMPHOCYTES NFR BLD AUTO: 8 %
MCH RBC QN AUTO: 28.4 PG (ref 26.5–33)
MCHC RBC AUTO-ENTMCNC: 31.7 G/DL (ref 31.5–36.5)
MCV RBC AUTO: 90 FL (ref 78–100)
MONOCYTES # BLD AUTO: 1 10E3/UL (ref 0–1.3)
MONOCYTES NFR BLD AUTO: 8 %
NEUTROPHILS # BLD AUTO: 9.5 10E3/UL (ref 1.6–8.3)
NEUTROPHILS NFR BLD AUTO: 81 %
NRBC # BLD AUTO: 0 10E3/UL
NRBC BLD AUTO-RTO: 0 /100
PLATELET # BLD AUTO: 171 10E3/UL (ref 150–450)
RBC # BLD AUTO: 3.48 10E6/UL (ref 4.4–5.9)
WBC # BLD AUTO: 11.6 10E3/UL (ref 4–11)

## 2021-12-17 PROCEDURE — 250N000013 HC RX MED GY IP 250 OP 250 PS 637: Performed by: PHYSICAL MEDICINE & REHABILITATION

## 2021-12-17 PROCEDURE — 99233 SBSQ HOSP IP/OBS HIGH 50: CPT | Mod: 24 | Performed by: NURSE PRACTITIONER

## 2021-12-17 PROCEDURE — 128N000003 HC R&B REHAB

## 2021-12-17 PROCEDURE — 97130 THER IVNTJ EA ADDL 15 MIN: CPT | Mod: GN | Performed by: SPEECH-LANGUAGE PATHOLOGIST

## 2021-12-17 PROCEDURE — 97110 THERAPEUTIC EXERCISES: CPT | Mod: GP

## 2021-12-17 PROCEDURE — 999N000125 HC STATISTIC PATIENT MED CONFERENCE < 30 MIN: Performed by: SPEECH-LANGUAGE PATHOLOGIST

## 2021-12-17 PROCEDURE — 85610 PROTHROMBIN TIME: CPT | Performed by: INTERNAL MEDICINE

## 2021-12-17 PROCEDURE — 250N000013 HC RX MED GY IP 250 OP 250 PS 637: Performed by: INTERNAL MEDICINE

## 2021-12-17 PROCEDURE — 99232 SBSQ HOSP IP/OBS MODERATE 35: CPT | Performed by: INTERNAL MEDICINE

## 2021-12-17 PROCEDURE — 99233 SBSQ HOSP IP/OBS HIGH 50: CPT | Mod: 24 | Performed by: PHYSICAL MEDICINE & REHABILITATION

## 2021-12-17 PROCEDURE — 999N000150 HC STATISTIC PT MED CONFERENCE < 30 MIN

## 2021-12-17 PROCEDURE — 999N000125 HC STATISTIC PATIENT MED CONFERENCE < 30 MIN: Performed by: STUDENT IN AN ORGANIZED HEALTH CARE EDUCATION/TRAINING PROGRAM

## 2021-12-17 PROCEDURE — 94640 AIRWAY INHALATION TREATMENT: CPT

## 2021-12-17 PROCEDURE — 250N000011 HC RX IP 250 OP 636: Performed by: PHYSICAL MEDICINE & REHABILITATION

## 2021-12-17 PROCEDURE — 94640 AIRWAY INHALATION TREATMENT: CPT | Mod: 76

## 2021-12-17 PROCEDURE — 250N000009 HC RX 250: Performed by: INTERNAL MEDICINE

## 2021-12-17 PROCEDURE — 36415 COLL VENOUS BLD VENIPUNCTURE: CPT | Performed by: PHYSICAL MEDICINE & REHABILITATION

## 2021-12-17 PROCEDURE — 97530 THERAPEUTIC ACTIVITIES: CPT | Mod: GO | Performed by: STUDENT IN AN ORGANIZED HEALTH CARE EDUCATION/TRAINING PROGRAM

## 2021-12-17 PROCEDURE — 97129 THER IVNTJ 1ST 15 MIN: CPT | Mod: GN | Performed by: SPEECH-LANGUAGE PATHOLOGIST

## 2021-12-17 PROCEDURE — 250N000009 HC RX 250: Performed by: PHYSICAL MEDICINE & REHABILITATION

## 2021-12-17 PROCEDURE — 250N000012 HC RX MED GY IP 250 OP 636 PS 637: Performed by: INTERNAL MEDICINE

## 2021-12-17 PROCEDURE — 85025 COMPLETE CBC W/AUTO DIFF WBC: CPT | Performed by: PHYSICAL MEDICINE & REHABILITATION

## 2021-12-17 PROCEDURE — 999N000157 HC STATISTIC RCP TIME EA 10 MIN

## 2021-12-17 PROCEDURE — 87449 NOS EACH ORGANISM AG IA: CPT | Performed by: PHYSICIAN ASSISTANT

## 2021-12-17 PROCEDURE — 97535 SELF CARE MNGMENT TRAINING: CPT | Mod: GO | Performed by: STUDENT IN AN ORGANIZED HEALTH CARE EDUCATION/TRAINING PROGRAM

## 2021-12-17 RX ADMIN — LEVALBUTEROL HYDROCHLORIDE 1.25 MG: 1.25 SOLUTION RESPIRATORY (INHALATION) at 08:02

## 2021-12-17 RX ADMIN — Medication 1 TABLET: at 08:26

## 2021-12-17 RX ADMIN — CALCIUM CARBONATE 600 MG (1,500 MG)-VITAMIN D3 400 UNIT TABLET 1 TABLET: at 18:19

## 2021-12-17 RX ADMIN — FUROSEMIDE 40 MG: 40 TABLET ORAL at 08:26

## 2021-12-17 RX ADMIN — NYSTATIN 1000000 UNITS: 100000 SUSPENSION ORAL at 08:26

## 2021-12-17 RX ADMIN — TACROLIMUS 2.5 MG: 1 CAPSULE ORAL at 08:26

## 2021-12-17 RX ADMIN — MYCOPHENOLATE MOFETIL 1000 MG: 250 CAPSULE ORAL at 21:51

## 2021-12-17 RX ADMIN — Medication 1 PACKET: at 08:27

## 2021-12-17 RX ADMIN — Medication 25 MG: at 22:34

## 2021-12-17 RX ADMIN — LEVALBUTEROL HYDROCHLORIDE 1.25 MG: 1.25 SOLUTION RESPIRATORY (INHALATION) at 13:55

## 2021-12-17 RX ADMIN — Medication 1.5 MG: at 18:19

## 2021-12-17 RX ADMIN — ACETAMINOPHEN 975 MG: 325 TABLET, FILM COATED ORAL at 04:05

## 2021-12-17 RX ADMIN — MULTIPLE VITAMINS W/ MINERALS TAB 1 TABLET: TAB at 08:26

## 2021-12-17 RX ADMIN — Medication 800 MG: at 08:27

## 2021-12-17 RX ADMIN — QUETIAPINE FUMARATE 50 MG: 50 TABLET ORAL at 21:51

## 2021-12-17 RX ADMIN — PREDNISONE 10 MG: 10 TABLET ORAL at 21:51

## 2021-12-17 RX ADMIN — NYSTATIN 1000000 UNITS: 100000 SUSPENSION ORAL at 12:49

## 2021-12-17 RX ADMIN — ACETYLCYSTEINE 2 ML: 200 SOLUTION ORAL; RESPIRATORY (INHALATION) at 20:06

## 2021-12-17 RX ADMIN — TACROLIMUS 2 MG: 1 CAPSULE ORAL at 18:19

## 2021-12-17 RX ADMIN — MYCOPHENOLATE MOFETIL 1000 MG: 250 CAPSULE ORAL at 08:25

## 2021-12-17 RX ADMIN — Medication 400 MG: at 18:18

## 2021-12-17 RX ADMIN — CALCIUM CARBONATE 600 MG (1,500 MG)-VITAMIN D3 400 UNIT TABLET 1 TABLET: at 08:26

## 2021-12-17 RX ADMIN — ACETYLCYSTEINE 2 ML: 200 SOLUTION ORAL; RESPIRATORY (INHALATION) at 08:02

## 2021-12-17 RX ADMIN — CEFTAZIDIME 2 G: 2 INJECTION, POWDER, FOR SOLUTION INTRAVENOUS at 20:09

## 2021-12-17 RX ADMIN — ROSUVASTATIN CALCIUM 10 MG: 10 TABLET, FILM COATED ORAL at 08:26

## 2021-12-17 RX ADMIN — CEFTAZIDIME 2 G: 2 INJECTION, POWDER, FOR SOLUTION INTRAVENOUS at 12:48

## 2021-12-17 RX ADMIN — PANTOPRAZOLE SODIUM 40 MG: 40 TABLET, DELAYED RELEASE ORAL at 06:09

## 2021-12-17 RX ADMIN — ESCITALOPRAM OXALATE 20 MG: 20 TABLET ORAL at 08:26

## 2021-12-17 RX ADMIN — Medication 1 PACKET: at 21:51

## 2021-12-17 RX ADMIN — LEVOTHYROXINE SODIUM 25 MCG: 25 TABLET ORAL at 08:27

## 2021-12-17 RX ADMIN — AMLODIPINE BESYLATE 5 MG: 5 TABLET ORAL at 08:25

## 2021-12-17 RX ADMIN — NYSTATIN 1000000 UNITS: 100000 SUSPENSION ORAL at 19:54

## 2021-12-17 RX ADMIN — NYSTATIN 1000000 UNITS: 100000 SUSPENSION ORAL at 16:19

## 2021-12-17 RX ADMIN — SULFAMETHOXAZOLE AND TRIMETHOPRIM 1 TABLET: 400; 80 TABLET ORAL at 08:26

## 2021-12-17 RX ADMIN — PREDNISONE 10 MG: 10 TABLET ORAL at 08:27

## 2021-12-17 RX ADMIN — FLUCONAZOLE, SODIUM CHLORIDE 400 MG: 2 INJECTION INTRAVENOUS at 10:36

## 2021-12-17 RX ADMIN — LEVALBUTEROL HYDROCHLORIDE 1.25 MG: 1.25 SOLUTION RESPIRATORY (INHALATION) at 20:06

## 2021-12-17 RX ADMIN — PANTOPRAZOLE SODIUM 40 MG: 40 TABLET, DELAYED RELEASE ORAL at 16:20

## 2021-12-17 RX ADMIN — ACETYLCYSTEINE 2 ML: 200 SOLUTION ORAL; RESPIRATORY (INHALATION) at 13:54

## 2021-12-17 RX ADMIN — CEFTAZIDIME 2 G: 2 INJECTION, POWDER, FOR SOLUTION INTRAVENOUS at 04:05

## 2021-12-17 RX ADMIN — FLUTICASONE PROPIONATE 1 SPRAY: 50 SPRAY, METERED NASAL at 08:32

## 2021-12-17 ASSESSMENT — ACTIVITIES OF DAILY LIVING (ADL)
ADLS_ACUITY_SCORE: 24
ADLS_ACUITY_SCORE: 25

## 2021-12-17 ASSESSMENT — MIFFLIN-ST. JEOR: SCORE: 1436.82

## 2021-12-17 NOTE — PLAN OF CARE
Discharge Planner Post-Acute Rehab SLP:      Discharge Plan: [facility] with [facility]      Precautions: aspiration precautions, sternal, fall     Current Status:  Communication: WFL. Mildly breathy, trach stoma covered.  Cognition: Mild-moderate cognitive impairments with deficits re: attention, exeuctive function, memory, and visuospatial skills.  Swallow: Regular, thin liquid. Medications to be given orally with thin liquids. Will continue to monitor.      Assessment: Did well with thin fluid trials, no sx aspiration with straw.  Worked on cognitive task for alternating attention, sequencing, working memory, needed min/moderate cueing for correct responses, but did improve with repetition. Slight increased difficulty as task progressed in complexity.  Assessed reading comprehension paragraph level.. occasionally misread words, needed review information to answer questions appropriately.  Pt does report occasional word finding difficulty - will further assess/tx PRN         Other Barriers to Discharge (Family Training, etc):

## 2021-12-17 NOTE — PROGRESS NOTES
3-Day Calorie Count Assessment:  12/14: 279 kcal and 21 g protein (1 meals - pt only ordered dinner meal, 0 supplements)  12/15: 558 kcal and 13 g protein (1 meal - pt only ordered lunch meal, 0 supplements)  12/16: 1737 kcal and 59 g protein (3 meals + estimated outside food, 0 supplements)  3 day average PO intake = 858 kcal (42% minimum energy needs) and 31 g protein (38% minimum protein needs)    Pt reviewed during team round, discussed nutrition plan of care and different options for adjustments of feeding schedule and discussed plan to further wean TF to encourage po intakes, MD suggested continue cycled plan, RD will update orders to below.    RD then visited pt later at bedside, reviewed improving intakes, reviewed plan for TF below, encouraged pt to drink supplements PRN for increased calorie/protein intake, encouraged pt to be choosing and focusing on consuming high calorie/high protein menu items, reviewed plan to continue calorie count, and encouraged pt to continue to document any outside foods so pt gets credit, pt verbalizes understanding and agreeing with nutrition plan of care as below.     RD paged Endocrinology and updated them on change of CHO amounts to address NPH order.     Plan/Recommendations:  RD changed cycled J-TF to Nepro w/carbsteady at 60 ml/hr x12 hrs from 6 pm to 6 am with 100 ml water flush q 4 hrs to provide 1296 kcals, 58 gm protein, 110 gm CHO, 9 gm fiber, 1123 ml water/day - TF now reduced to ~63% lower end kcal needs, ~70% lower end protein needs. Re-ordered calorie count x3 more days, continue diet and supplement orders as ordered.     Zenaida Washington RD, CNSC, LD  ARU RD pager: [phone]  Weekend/holiday pager: [phone]

## 2021-12-17 NOTE — PLAN OF CARE
FOCUS/GOAL  Medical management    ASSESSMENT, INTERVENTIONS AND CONTINUING PLAN FOR GOAL:  Pt is alert and oriented. Has tremors on bilateral hands. Needs help in using the urinal. He wants to sleep tonight but he is cooperative w/ all the treatments he has. On cycled TF from 6pm to 6am w/ 100cc water flush. Can take pills whole. He has IV antibiotics every 8 hrs. Has triple lumen on left forearm. Use the gray one during IV meds infusion. All PICC line has blood return, saline locked, cap changed. Can take pills whole w/ sips of thin liquid. Warm blanket provided after he had medication. Slept in between cares. Cont w/ POC.

## 2021-12-17 NOTE — PLAN OF CARE
Discharge Planner Post-Acute Rehab PT:     Discharge Plan: [facility] with [facility], then home to [place]    Precautions: Falls, dyspnea on exertion    Current Status:  Bed Mobility: Min A  Transfer: CGA with WW  Gait: CGA x 15 ft with WW  Stairs: 5 step ups with min A  Balance: Multiple LOB during session, recommending CGA at all times    Assessment:  Increased work of breathing today, but SpO2 >95% throughout. Increased difficulty tolerating basic mobility today.    Other Barriers to Discharge (DME, Family Training, etc): Significant deconditioning

## 2021-12-17 NOTE — PLAN OF CARE
FOCUS/GOAL  Bowel management, Bladder management, Nutrition/Feeding/Swallowing precautions, Pain management, Wound care management, Mobility, Skin integrity, and Cognition/Memory/Judgment/Problem solving    ASSESSMENT, INTERVENTIONS AND CONTINUING PLAN FOR GOAL:    Patient A&Ox4. Patient denies pain, headache, CP, nausea, new numbness and tingling, and fever. Dyspneic during exertion. Transfers Ax1 with walker, up to toilet. Cont of urine, uses urinal, incont of bowel, no BM during shift, LBM 12/15. Level 7- Easy to chew and 2g K diet, thin liquids, and takes pills whole. J-tube receiving Novasource Renal at 80 mL/hr from 6pm to 6am, 100cc water flushes every 4 hrs. PEG tube dressing changed. LUE triple lumen non-powered PICC, saline locked after abx. Bgs 254 and 112. Blanchable redness noted to buttock, trach site dressing clean, dry, and intact. Chest tube wounds approximated. VSS. Continue POC.

## 2021-12-17 NOTE — PROGRESS NOTES
[facility]   Acute Rehabilitation Unit  Daily progress note    INTERVAL HISTORY  No acute events overnight. Triggered sepsis protocol for tachycardia, leukocytosis, lactate 1.6. Feeling better today though still feels shortness of breath/dyspnea was slightly below baseline. Tremors also somewhat improved. We discuss that this is possibly associated with fatigue as he continues to have poor sleep. We discuss addition of trazodone prn, which he is agrees to try. Denies pain or further concerns.     Functionally he is CGA for grooming, Mod A LBD, Min A bathing, ambualting 15 ft with a WW, transfers with CGA, and bed mobility with Min A. Remains cleared for regular diet/thin liquids. Please see team rounds note for further details.    MEDICATIONS  Scheduled meds    acetaminophen  975 mg Oral or Feeding Tube Q8H     acetylcysteine  2 mL Nebulization TID     amLODIPine  5 mg Oral Daily     calcium carbonate 600 mg-vitamin D 400 units  1 tablet Oral or Feeding Tube BID w/meals     cefTAZidime  2 g Intravenous Q8H     escitalopram  20 mg Oral or Feeding Tube Daily     fiber modular (NUTRISOURCE FIBER)  1 packet Per Feeding Tube BID     fluconazole  400 mg Intravenous Q24H     fluticasone  1 spray Both Nostrils Daily     folic acid-vit B6-vit B12  1 tablet Oral Daily     furosemide  40 mg Oral Daily     insulin aspart  1-12 Units Subcutaneous 5 times per day     insulin aspart   Subcutaneous TID w/meals     insulin glargine  13 Units Subcutaneous QAM     insulin NPH  45 Units Subcutaneous Q24H     levalbuterol  1.25 mg Nebulization TID     levothyroxine  25 mcg Oral Daily     Lidocaine  2 patch Transdermal Q24H     lidocaine   Transdermal Q8H     magnesium oxide  400 mg Oral QPM     magnesium oxide  800 mg Oral QAM     multivitamin w/minerals  1 tablet Oral Daily     mycophenolate  1,000 mg Oral BID     nystatin  1,000,000 Units Swish & Swallow 4x Daily     pantoprazole  40 mg Oral BID  "AC     predniSONE  10 mg Oral or Feeding Tube BID     QUEtiapine  50 mg Oral At Bedtime     rosuvastatin  10 mg Oral or Feeding Tube Daily     sodium chloride (PF)  10-40 mL Intracatheter Q8H     sulfamethoxazole-trimethoprim  1 tablet Oral or Feeding Tube Daily     tacrolimus  2 mg Oral QPM     tacrolimus  2.5 mg Oral QAM       PRN meds:  albuterol, bisacodyl, dextrose, glucose **OR** dextrose **OR** glucagon, hydrOXYzine, insulin aspart, lidocaine (viscous), magnesium hydroxide, melatonin, naloxone **OR** naloxone **OR** naloxone **OR** naloxone, ondansetron, oxyCODONE, - MEDICATION INSTRUCTIONS -, prochlorperazine, QUEtiapine, senna-docusate, sodium chloride (PF), Warfarin Therapy Reminder, zinc-white petrolatum      PHYSICAL EXAM  BP (!) 149/76 (BP Location: Right arm)   Pulse 103   Temp 96.9  F (36.1  C) (Oral)   Resp 20   Ht 1.646 m (5' 4.8\")   Wt 68.3 kg (150 lb 9.6 oz)   SpO2 95%   BMI 25.22 kg/m    Gen: Awake, coooperative  HEENT: atraumatic, no discharge from nares or ears, EOM intact  Cardio: Borderline tachycardic, no murmur auscultated  Pulm: Non-labored breathing, CTAB with diminished sounds at R lung base  Abd: Soft, non-tender to palpation, bowel sounds present. PEG c/d/i  Ext: No edema in lower or upper extremities b/l, no calf tenderness  Neuro/MSK: alert, oriented, no new slurring of words, answers questions appropriately, follows commands. Increased tremor from prior      LABS  Recent Labs   Lab 12/17/21  0200 12/16/21  2036 12/16/21  1912 12/16/21  1655 12/16/21  1611 12/16/21  0831 12/16/21  0707 12/16/21  0705 12/15/21  1704 12/15/21  1139 12/15/21  0739 12/15/21  0647 12/14/21  1142 12/14/21  1106 12/12/21  0933 12/12/21  0519   WBC  --   --   --   --  15.8*  --   --   --   --   --   --  11.0  --  13.2*  --  9.7   HGB  --   --   --   --  10.6*  --   --   --   --   --   --  10.2*  --  11.5*  --  9.8*   MCV  --   --   --   --  89  --   --   --   --   --   --  90  --  90  --  93   PLT  -- "   --   --   --  199  --   --   --   --   --   --  193  --  218  --  144*   INR  --   --   --   --   --   --  2.64*  --   --   --   --  2.41*  --  2.25*   < > 2.52*   NA  --   --   --   --   --   --   --  137  --   --   --   --   --  135  --  141   POTASSIUM  --   --   --   --   --   --   --  4.8  --  4.4  --   --   --  5.0  --  4.5   CHLORIDE  --   --   --   --   --   --   --  106  --   --   --   --   --  101  --  108   CO2  --   --   --   --   --   --   --  27  --   --   --   --   --  25  --  26   BUN  --   --   --   --   --   --   --  29  --   --   --   --   --  33*  --  34*   CR  --   --   --   --   --   --   --  0.74  --   --   --   --   --  0.82  --  1.00   ANIONGAP  --   --   --   --   --   --   --  4  --   --   --   --   --  9  --  7   ERIK  --   --   --   --   --   --   --  9.5  --   --   --   --   --  9.9  --  8.6   * 112* 254*   < >  --    < >  --  90   < >  --    < >  --    < > 115*   < > 167*   ALBUMIN  --   --   --   --   --   --   --   --   --   --   --   --   --   --   --  2.5*   PROTTOTAL  --   --   --   --   --   --   --   --   --   --   --   --   --   --   --  5.8*   BILITOTAL  --   --   --   --   --   --   --   --   --   --   --   --   --   --   --  0.2   ALKPHOS  --   --   --   --   --   --   --   --   --   --   --   --   --   --   --  104   ALT  --   --   --   --   --   --   --   --   --   --   --   --   --   --   --  26   AST  --   --   --   --   --   --   --   --   --   --   --   --   --   --   --  17    < > = values in this interval not displayed.     Recent Results (from the past 24 hour(s))   XR Chest 2 Views    Narrative    EXAM: XR CHEST 2 VW  12/16/2021 1:56 PM     HISTORY:  Increased SOB   . Bilateral lung transplant Recent Candida  empyema.    COMPARISON:  Chest x-ray 12/10/2021    TECHNIQUE: PA and lateral views the chest    FINDINGS:   Left PICC line with tip in the distal SVC. Postsurgical changes of  bilateral lung transplantation with contrast on the wires and  surgical  clips. Surgical clips over the left upper abdomen..    The trachea is midline. The cardiomediastinal silhouette is stable the  pulmonary vasculature is indistinct. No appreciable pneumothorax.  Stable blunting of the right costophrenic angle. Stable loculated left  pleural effusion. Stable cavitary left lower lung lesion. Chronic  right clavicular deformity.. No acute osseous abnormality.      Impression    IMPRESSION:  1. Stable postsurgical changes of bilateral lung transplantation.  2. Stable right simple pleural effusion and left loculated pleural  effusion.  3. Stable left lower lobe cavitary nodule.    I have personally reviewed the examination and initial interpretation  and I agree with the findings.    JODI MENDEZ MD         SYSTEM ID:  [id number]       ASSESSMENT AND PLAN    Edson Thornton is a 56 year old right hand dominant male with a hx of NSIP/ILD associated with rheumatoid lung disease, hypertension, hyperlipidemia, RA who initially presented 9/5/2021 with acute on chronic respiratory failure for transplant work-up s/p bilateral lung transplant (10/16/21) with prolonged hospitalization associated with multifocal bilateral acute/subacute ischemic infarcts of bilateral frontal, occipital lobes.  Postoperative course complicated by prolonged ventilator wean s/p trach and PEG placement (10/29), encephalopathy, new Afib with RVR, BRENNAN, candidemia/Candida empyema, and brief bradycardic arrest with subsequent hypotension in the setting of GI bleed.  [facility] and was decannulated 12/8/2021.  He is currently stable on room air at admission to acute rehab.  He presents with right upper extremity weakness, bilateral dysmetria/tremors, acute on chronic severe deconditioning, dyspnea on exertion, and impairment of ADLs and gait.     Admission to acute inpatient rehab 12/13/2021  .    Impairment group code: Stroke Ischemic 01.3 Bilateral Involvement:  embolic CVA affecting B cerebral hemispheres and L cerebellum, as well as s/p bilateral lung transplantation     1. PT, OT 90 minutes of each on a daily basis, in addition to rehab nursing and close management of physiatrist.   2. Impairment of ADLs/IADLs:  OT for 60 min daily to work on upper and lower body self care, dressing, toileting, bathing, energy conservation techniques with use of ADs as needed.   3. Impairment of mobility:   PT for 60 min daily to work on gait exercises, strengthening, endurance buildup, transfers with use of walker as needed.   4. Impairment of cognition:  SLP for 60 minutes for cognitive evaluation and treatment strategies for higher level cognitive deficits, and memory impairment.  5. Rehab RN to administer medication, patient education on medication taking, VS monitoring, bowel regimen, glucose monitoring and wound care/surgical wound dressing changes and monitoring.   6. Medical Conditions - Hospital ist team consulted, appreciate assistance     Neuro/Psych  #Multifocal acute to subacute ischemic stroke, etiology likely embolic vs perioperative  Initially noted 10/22 and 11/6 with stroke code called for change in exam.  MRI brain 10/23 with infarcts noted in both cerebral hemispheres (R frontal, L corona radiata, bilateral occipital), left cerebellum, presumed associated with new Afib vs perioperative. Bilateral upper extremity paresis (R>L), suspicion for some cortical blindness  - Stroke risk factor management:              -Warfarin anticoagulation for Afib, pharmacy to dose              -BP goals: SBP<140              - Rosuvastatin 10 mg qday              -Not smoking              -Hgb A1c: 6.6     #Pain  -Acetaminophen 975mg q8h  -Lidocaine patch  -Menthol patch     #Encephalopathy  #Sleep  -Delirium precautions  -Seroquel 50 mg at bedtime, 25mg TID prn; scheduled 25mg discontinued 12/14, continue to wean as able  -Melatonin 10mg qhs  -Trazodone 25mg prn     #Mood  #History of  anxiety and depression  - PTA Lexapro increased to 10 mg while in acute hospital, increased to 20mg 12/14  -Hydroxyzine as needed         Pulmonary  #ILD, NISP, s/p bilateral lung transplant (10/16/2021)  #Acute on chronic respiratory failure s/p tracheostomy (10/29/21) and decannulation (12/8/21)  #Bilateral Pleural Effusions  -Sternal precautions until 12 wks post op (1/8/2022)  -Immunosuppression:              -s/p basiliximab on 10/16, 10/20              -Continue tacrolimus 2.5mg qam and 2mg qhs, goal 8-12              -Continue MMF 1000mg BID              -Continue prednisone 10mg BID  -Monitoring lab/imaging              -DSA q2wks              -Monthly Coccidioides Ag due 12/17              -CMV, EBV from 12/15 pending.              -CXR, CBC, BMP, Tac level M/Th  -Continue Levalbuterol and mucomyst nebs QID  -Aggressive pulmonary toilet and chest PT QID  -Continue Lasix 40mg qday     #SALTY  -PTA CPAP     ID  #Immunosuppression Prophylaxsis   -Bactrim x6 months  -Nystatin x6 months     #L lung cavitary lesion  #Hospital-acquired pneumonia with Klebsiella pneumoniae, Pseudomonas fluorescens/putida  Suspected aspiration versus pseudomonal vs Klebsiella pneumonia related to abscess.  CT on 1025 with left lower lobe nodular opacity with repeat on 11/22 showing new cavitary lesion in left lung base, multifocal bilateral upper lobe groundglass opacity, and new 1.8 cm fluid collection with surrounding fat stranding in left axilla, decreased bilateral loculated pleural effusions.  QuantiFERON gold indeterminate, negative tuberculin skin test on multiple occurrences.  Underwent BAL on 11/22 with cultures growing Klebsiella and Pseudomonas fluorescens/putida, resistant to meropenem.  ID was consulted and feels this is likely fungal.  B-D glucan positive with known candidemia.  Cocci antigen in urine negative, serum histoplasmosis negative, CRAG negative  -IV ceftazidime 2g q8hrs (end 12/21)  -Follow-up chest CT 12/21  and will discuss with ID/Dr. Abebe after     #Invasive Candida albicans candidiasis  Positive Candida blood cultures 10/20, 10/22.  Fungitell positive (399).  TC 10/23 without evidence of endocarditis. Ophthalmic consulted 10/24, benign funduscopic exam.  Candida empyema 10/25.  Repeated pleural effusion fungal cultures and fungal/bacterial blood cultures have been without growth since 11/18.  Initially on micafungin (10/22-10/27) before transition to fluconazole IV  -Continue IV Fluconazole 400mg daily (10/26-12/21)     #hx of HSV  Chronic intermittent infection pretransplant, with recent HSV infection while in acute hospital (crusted lesions along left jaw) s/p 10d treatment until 10/9.     Cardiovascular  #Afib  Initially noted 10/18, converted to NSR with amio ggt. Metoprolol and amiodarone discontinued due to bradycardia. INR 2.77 12/17  -Warfarin as dosed by pharmacy     #Hypertension  -PTA amlodipine 5 mg daily     Heme  #Leukocytosis  New leukocytosis to 13.2 on 12/14--> 11.0 12/15 -->15.8 12/16 --> 11.6 12/17. Given prednisone, likely sits upper limit of normal. Patient with worsening dyspnea on exertion, SOB on 12/16 but CXR equivocal and remains afebrile. Already on abx/antifungals. May be stress response to when patient becomes more anxious/agitated. Continue to monitor CBCs and for signs/symptoms of infection     #R subclavian DVT  -Warfarin anticoagulation, pharmacy to dose     #Hypogammaglobulinemia  S/p IVIG  -Repeat IgG 12/15 365  -Repeat ~12/30     Endocrine  #Type 2 diabetes  #Steroid-induced hyperglycemia  -Endocrine consulted while in acute hospital. Reconsulted 12/14 with anticipation of transitioning TF, appreciate recs. Discussed TF decrease, will update recs accordingly                 -NPH 45 units at start of TF cycle                 - AM Lantus to 15 units--> reduce to 13 units 12/17                  -Novolog high resistance sliding scale change to 5x, 0200, 0800, 1200, 1800, 2200                  -continue 1:15g CHO with meals, snacks,                  -BG monitoring QAC, HS                  -hypoglycemia protocol                 -diabetes education needs will be assessed closer to discharge, when TF plan solidified                 -on discharge, will recommend outpatient follow up with [facility] service or endocrinology closer to home in [place]     #Hypothyroidism  TSH 3.17 on 11/19  -Continue levothyroxine 25 mcg daily     Renal  #BRENNAN, resolved  Cr 0.82 12/14  -Monitor intermittently     MSK/Rheum  #Rheumatoid arthritis  Previously treated with rituximab.  Rheumatology familiar and consulted early in admission.  -Steroids as above     GI/FEN  #Severe malnutrition  Patient currently has PEG in place, receiving majority of nutrition through cycled tube feeds. Has had borderline K (4.8-5.0) and transitioned to renal formulation.   -Continue multivitamin, folate, B6, B12 supplements  -TF per dietician, appreciate assistance.  Currently cycled, adjusting formula/amount 12/17  -Encourage p.o. intake     #hx of GI Bleed  Patient with progressive hypotension and CODE BLUE for bradycardia/asystole associated with GI bleed in the setting of anticoagulation, requiring massive transfusion protocol on 10/22.  EGD on 11/2 with clotted blood in stomach, adherent clot near PEG site that was clipped without active bleeding.  Most recent EGD 11/5 with ulcer noted near the PEG bumper site, gastritis, suction marks from the G-tube.  GJ tube was exchanged 11/9 by GI. Hgb 10.2 12/15  -Monitor hemoglobin intermittently.  -Continue PPI BID      Bowel/Bladder  Bowel, continent  Constipation  - Senna-docusate 1-2 tablets BID  - Miralax QDAY PRN  - Bisacodyl suppository 10mg QDAY PRN     Bladder, continent     1. Adjustment to disability:  Clinical psychology to eval and treat as indicated  2. FEN: Regular with thin liquids, cycled TF  3. Bowel: continent, meds as above  4. Bladder: continent  5. DVT  Prophylaxis: warfarin  6. GI Prophylaxis: PPI  7. Code: full  8. Disposition: Local housing - [place] [facility]  9. ELOS:  2-3 weeks  10. Rehab prognosis:  good  9. Follow up Appointments on Discharge:                -Neurology 1-2 months after discharge               -Outpatient cardiac/pulmonary Rehab               -Transplant Coordinator               -ID: Follow up with Dr. Abebe on 12/21/2021 (likely need to reschedule)     Mo Austin MD  PGY-2  Physical Medicine & Rehabilitation

## 2021-12-17 NOTE — PROGRESS NOTES
[facility]    Medicine Progress Note - Hospitalist Service       Date of Admission:  12/13/2021    Assessment & Plan             Mr. 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      # ILD and NSIP s/p BSLT on 10/16/2021  # Acute on chronic hypoxic respiratory failure s/p tracheostomy on 10/29/21 and decannulation on 12/8/2021  # Bilateral pleural effusions, improved  - Transplant pulmonology following. IS dosing per Pulmonary team.    -Immunosuppression: s/p induction therapy with basiliximab 10/16 (and high dose IV steroid) and 10/20 Continue tacrolimus 2.5 mg qAM / 2 mg qPM (decreased 12/11).  Goal level 8-12.  [id number] mg BID (decreased 11/2 given GI bleed, AZA to be avoided given TPMT) , prednisone 10 mg BID, next taper due 1/2/22   Prophylaxis:   - Bactrim for PJP ppx (held 11/2-11/5 d/t BRENNAN)  - Nystatin for oral candidiasis ppx, 6 month course  Serology:     Donor Recipient Intervention   CMV status Negative Negative None, CMV monthly   EBV status Positive Positive None, EBV monthly    HSV status N/A Positive S/p acyclovir POD #1-30 (recent infection history pre-txp)      Positive cross match:   Positive DSA: Note that he received two doses of rituximab in June, which is likely contributing to cross match result.  DSA newly positive 11/29 with DQB5 (mfi 2522), decreased (mfi 1873) on 12/6. DSA on 12/12, decreased further to 1703.   - Repeat DSA (12/26) every two weeks    Others:   - Levalbuterol and mucomyst TID and pulm toilet and chest PT BID  - PT/OT/SLP -@ PM&R team.   -  Continue 40 mg of Lasix daily  - Change oxycodone 5 mg TID PRN  - Scheduled acetaminophen 975 tid  - CXR Mondays  - CMP, Mg++, CBC qMTh      # Left lung cavitary lesion   # Klebsiella pneumoniae and Pseudomonas fluorescens/putida HAP  aspiration vs pseudomonal vs Klebsiella pneumonia related abscess. Indeterminate Quant Gold, but negative tuberculin skin tests on mulitple occurences. S/p BAL on 11/22.Cocci antigen in urine negative, serum, BAL histoplasmosis negative, CRAG negative, respiratory viral panel (-), TB (-), candida growth from bal culture. Transplant ID consulted.     - IV Ceftazdime 2 grams Q8H (until 12/21)  - Follow up CT chest in 4 weeks on 12/21  - Follow up with Dr. Abebe on 12/21/2021 @6:30 PM in a virtual visit.       # Invasive candidiasis with Candida albicans  Positive candida BCx 10/20 and 10/22.  BDG fungitell positive (399) on 10/20. Sputum Cx + Candida albicans persistently.  TC 10/23 without evidence of endocarditis. Ophthalmology consult 10/24 with benign dilated fundoscopic exam.  Candida empyema improved after chest tube drainage. Repeat pleural fungal cultures and fungal/bacterial blood cultures negative.     - Continue Fluconazole 400 mg IV daily until 12.21.     # HSV: Chronic intermittent active infection pre-transplant with recent HSV infection: crusted lesions throughout left side of jaw, s/p 10 day treatment course of ACV through 10/9.  HSV PCR blood negative 10/17.  S/p ACV ppx as above (started POD #1 instead of POD #8 given HSV history and location).    # Hypogammaglobinemia:   IgG previously low at 364 (9/7).  Noted at 265 at time of transplant, s/p IVIG 10/21, repeat IgG (11/17) 198, s/p IVIG (with premedication) 11/18. Repeat IVIG on 12/15 of 365;   Per Pulm: IVIG indicated but [facility]  unable to administer IVIG and patient is unable to leave to receive it in an infusion center.  - Repeat IgG ~ 12/30  - Further per Transplant team.     # Encephalopathy, resolved  # Difficulty  sleeping  Likely multi-factorial in the setting of stroke, prolonged critical illness.  - Ct Seroquel 25 mg TID prn and 50 mg at bedtime. Wean down as tolerated.   - Melatonin 10 mg at bedtime  - Trazodone 25 mg prn.      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 MRI brain on 10/23 with multifocal subacute infarct within both cerebral hemispheres and left cerebellum. Presumed embolic with afib hx, identified while inpatient. He is currently able to ambulate with relative ease.    - Continue warfarin per pharmacy protocol      # Right subclavian DVT   Diagnosed on 11/4 on ultrasound.    - Continue warfarin per pharmacy protocol     # DM2 with steroid induced hyperglycemia  Hemoglobin A1c 6.6 pre-operatively. Endocrine recently consulted for steroids induced hyperglycemia.     -Continue glargine 13 IU qAM  -Continue NPH 45 IU qHS  -Novolog High Sliding scale insulin   -Novolog carbh coverage 1/15 gm cho  -Blood glucose QID with meals      # Severe malnutrition in the context of acute on chronic illness  Patient currently has a PEG J-tube and is getting the Jorde of nutrition through cycle tube feeds.  - Multivitamin, folic acid, B6, B12 supplements   - TF per nutrition; continue oral diet as well     # Anxiety and depression   Psychiatry reconsulted and after discussion suggest increasing lexapro. Will discuss with patient regarding starting ritalin in the AM for motivation.  -Ct Lexapro 20 mg daily (increased dose)  -As needed hydroxyzine.     # Concern for chipped tooth  # Poor dental hygiene  Patient noted he feels like he may have chipped his tooth or has a loose tooth after eating gueavra. It is not painful.   - Dental hygiene consult placed and chipped tooth stable_ rec oral hygiene cares.     # Rheumatoid arthritis- Dx 5/2021 with + CCP antibody and RF. Previously treated with rituximab. Rheum consulted early in admission.  On steroids as noted above.     # SALTY - Noted pre-transplant. Home CPAP 6-12 cm H2O.  - Evaluate need for BiPAP after trach site has healed     # HTN- Continue PTA amlodipine.   Stable.     # Hypothyroidism - TSH 3.17 on 11/19/21.   -Continue PTA levothyroxine 25 mcg daily.     # Recurrent GIB - hgb drop on 10/22 s/p 2 unit pRBC transfusion with EGD on 10/23 with NJ/OG tube trauma with scant oozing. Patient then developed progressive hypotension and ultimately CODE BLUE for GIB in setting of anticoagulation with heparin drip, s/p massive transfusion protocol. EGD on 11/2 with large amount of clotted blood in stomach and area of raised mucosa with small adherent clot near PEG tube site that was clipped, no active bleeding.  Maroon stools 11/3, repeat EGD with extensive old blood in stomach, no active bleeding, small nodular area with prior clips clipped again.  Most recent EGD 11/5 with ulcer noted at PEG tube bumper site, gastritis, and suction marks from G tube. TF noted in G tube drainage bag 11/7, AXR with GJ tube tip projecting over proximal duodenum. GJ tube exchanged 11/9 by GI.    - PO PPI BID    .   # Hypomagnesemia: Suppressed reabsorption 2/2 CNI.  Requiring intermittent IV and PO replacement.  - Mag-Ox 800 mg qAM / 400 mg qPM (increased 12/8)    # Tremors: ? IS/Tacrolimus related. Ct to monitor.         Diet: Snacks/Supplements Adult: Other; Ensure Clear or Gelatein PRN; With Meals  Room Service  2 Gram K Diet  Adult Formula Drip Feeding: Continuous Novasource Renal; Jejunostomy; Goal Rate: 80 ml/hr x12 hrs from 6 PM to 6 AM (1 L per night); mL/hr; From: 6:00 PM; 6:00 AM; Medication - Feeding Tube Flush Frequency: At least 15-30 mL water before and after...  Calorie Counts    DVT Prophylaxis: Warfarin  Watson Catheter: Not present  Central Lines: None  Code Status: Full Code      Disposition Plan defer to primary       The patient's care was discussed with the Patient and Primary team.    Sadiq Valdez,  MD  Hospitalist Service  [facility]  Securely message with the [employer] Web Console (learn more here)  Text page via [employer] Paging/Directory        ______________________________________________________________________    Interval History   Interval events reviewed.   Says overall feeling slightly better today  Still w/ MYERS+ however says stable to better today  Intermittent ongoing, stable cough.   Denies fever  No chest pain  Gen weakness.   No NV or pain abdomen.   No new sensory or motor complaint.   No new rash.    No other new or acute medical concern      Data reviewed today: I reviewed all medications, new labs and imaging results over the last 24 hours. I personally reviewed no images or EKG's today.    Physical Exam   Vital Signs: Temp: 96.9  F (36.1  C) Temp src: Oral BP: (!) 149/76 Pulse: 103   Resp: 20 SpO2: 95 % O2 Device: None (Room air)    Weight: 150 lbs 9.6 oz      General Appearance:  Awake, interactive, NAD  Respiratory: Non labored breathing at rest. CTA bl  Cardiovascular: RRR s1s2  GI: Soft. NT. ND.  Extremities: Distally wwp. No pedal edema  Skin: No acute rash on exposed areas.   Neuro: moving all extremities. Tremors both UE+  Others: anxious.       Data   Recent Labs   Lab 12/17/21  1200 12/17/21  0819 12/17/21  0759 12/17/21  0200 12/16/21  1655 12/16/21  1611 12/16/21  0831 12/16/21  0707 12/16/21  0705 12/15/21  1704 12/15/21  1139 12/15/21  0739 12/15/21  0647 12/14/21  1142 12/14/21  1106 12/12/21  0933 12/12/21  0519   WBC  --   --  11.6*  --   --  15.8*  --   --   --   --   --   --  11.0  --  13.2*  --  9.7   HGB  --   --  9.9*  --   --  10.6*  --   --   --   --   --   --  10.2*  --  11.5*  --  9.8*   MCV  --   --  90  --   --  89  --   --   --   --   --   --  90  --  90  --  93   PLT  --   --  171  --   --  199  --   --   --   --   --   --  193  --  218  --  144*   INR  --   --  2.77*  --   --   --   --  2.64*  --   --   --   --  2.41*  --   2.25*   < > 2.52*   NA  --   --   --   --   --   --   --   --  137  --   --   --   --   --  135  --  141   POTASSIUM  --   --   --   --   --   --   --   --  4.8  --  4.4  --   --   --  5.0  --  4.5   CHLORIDE  --   --   --   --   --   --   --   --  106  --   --   --   --   --  101  --  108   CO2  --   --   --   --   --   --   --   --  27  --   --   --   --   --  25  --  26   BUN  --   --   --   --   --   --   --   --  29  --   --   --   --   --  33*  --  34*   CR  --   --   --   --   --   --   --   --  0.74  --   --   --   --   --  0.82  --  1.00   ANIONGAP  --   --   --   --   --   --   --   --  4  --   --   --   --   --  9  --  7   ERIK  --   --   --   --   --   --   --   --  9.5  --   --   --   --   --  9.9  --  8.6   * 107*  --  154*   < >  --    < >  --  90   < >  --    < >  --    < > 115*   < > 167*   ALBUMIN  --   --   --   --   --   --   --   --   --   --   --   --   --   --   --   --  2.5*   PROTTOTAL  --   --   --   --   --   --   --   --   --   --   --   --   --   --   --   --  5.8*   BILITOTAL  --   --   --   --   --   --   --   --   --   --   --   --   --   --   --   --  0.2   ALKPHOS  --   --   --   --   --   --   --   --   --   --   --   --   --   --   --   --  104   ALT  --   --   --   --   --   --   --   --   --   --   --   --   --   --   --   --  26   AST  --   --   --   --   --   --   --   --   --   --   --   --   --   --   --   --  17    < > = values in this interval not displayed.     Recent Results (from the past 24 hour(s))   XR Chest 2 Views    Narrative    EXAM: XR CHEST 2 VW  12/16/2021 1:56 PM     HISTORY:  Increased SOB   . Bilateral lung transplant Recent Candida  empyema.    COMPARISON:  Chest x-ray 12/10/2021    TECHNIQUE: PA and lateral views the chest    FINDINGS:   Left PICC line with tip in the distal SVC. Postsurgical changes of  bilateral lung transplantation with contrast on the wires and surgical  clips. Surgical clips over the left upper abdomen..    The trachea is  midline. The cardiomediastinal silhouette is stable the  pulmonary vasculature is indistinct. No appreciable pneumothorax.  Stable blunting of the right costophrenic angle. Stable loculated left  pleural effusion. Stable cavitary left lower lung lesion. Chronic  right clavicular deformity.. No acute osseous abnormality.      Impression    IMPRESSION:  1. Stable postsurgical changes of bilateral lung transplantation.  2. Stable right simple pleural effusion and left loculated pleural  effusion.  3. Stable left lower lobe cavitary nodule.    I have personally reviewed the examination and initial interpretation  and I agree with the findings.    JODI MENDEZ MD         SYSTEM ID:  [id number]     Medications     dextrose       - MEDICATION INSTRUCTIONS -       Warfarin Therapy Reminder         acetaminophen  975 mg Oral or Feeding Tube Q8H     acetylcysteine  2 mL Nebulization TID     amLODIPine  5 mg Oral Daily     calcium carbonate 600 mg-vitamin D 400 units  1 tablet Oral or Feeding Tube BID w/meals     cefTAZidime  2 g Intravenous Q8H     escitalopram  20 mg Oral or Feeding Tube Daily     fiber modular (NUTRISOURCE FIBER)  1 packet Per Feeding Tube BID     fluconazole  400 mg Intravenous Q24H     fluticasone  1 spray Both Nostrils Daily     folic acid-vit B6-vit B12  1 tablet Oral Daily     furosemide  40 mg Oral Daily     insulin aspart  1-12 Units Subcutaneous 5 times per day     insulin aspart   Subcutaneous TID w/meals     insulin glargine  13 Units Subcutaneous QAM     insulin NPH  45 Units Subcutaneous Q24H     levalbuterol  1.25 mg Nebulization TID     levothyroxine  25 mcg Oral Daily     Lidocaine  2 patch Transdermal Q24H     lidocaine   Transdermal Q8H     magnesium oxide  400 mg Oral QPM     magnesium oxide  800 mg Oral QAM     multivitamin w/minerals  1 tablet Oral Daily     mycophenolate  1,000 mg Oral BID     nystatin  1,000,000 Units Swish & Swallow 4x Daily     pantoprazole  40 mg Oral BID AC      predniSONE  10 mg Oral or Feeding Tube BID     QUEtiapine  50 mg Oral At Bedtime     rosuvastatin  10 mg Oral or Feeding Tube Daily     sodium chloride (PF)  10-40 mL Intracatheter Q8H     sulfamethoxazole-trimethoprim  1 tablet Oral or Feeding Tube Daily     tacrolimus  2 mg Oral QPM     tacrolimus  2.5 mg Oral QAM     warfarin ANTICOAGULANT  1.5 mg Oral ONCE at 18:00

## 2021-12-17 NOTE — CARE CONFERENCE
Acute Rehab Care Conference/Team Rounds      Type: Team Rounds    Present: Dr. Johnny Coronado, Mo Austin Resident, Swathi Abbott RN, Yeimy Stratton SW, Lydia Hogan OT, Astrid Isaac PT, Liliya Bowen SLP, Ragini Morris , Zenaida Felix Dietician, Patient Bret Thornton      Discharge Barriers/Treatment/Education    Rehab Diagnosis: Embolic CVA    Active Medical Co-morbidities/Prognosis: ILD s/p b/l lung transplants, SALTY, HTN, HLD, hypogammaglobulinemia, anxiety, depression, dyspnea, pulmonary effusion, DVT, GI bleed, encephalopathy, tremors, a, fib with RVR, BRENNAN, candidate empyema, candidemia, HCAP, hypothyroidism, DM II, RA, malnutrition s/p PEG tube    Safety: is alert and oriented. S/p lung transplant and sustained Ischemic stroke. Noted for generalized weakness w/ tremors on upper extremities. Per report CGA1 gb and walker for ambulation. On cycled TF from 6pm to 6am, via J tube. BG 5x a day w/ insulin coverage.    Pain: No complain of pain.    Medications, Skin, Tubes/Lines: Can take medication whole w/ thin liquid. Sternal incision to chest  And chest tube site are KIM. W/ blanchable redness to coccyx. Has triple lumen to left fore arm. Has IV antibiotic every 8 hrs.    Swallowing/Nutrition:SLP pt advanced to regular diet, thin fluids. Will plan to follow up, regarding safety/tolerance thin fluids     Bowel/Bladder: Cont of B/B. LBM 12/15/21.    Psychosocial: In a relationship with s/o. Will go to [place] at discharge. Long hospital stay. No prior substance abuse or financial concerns reported. Good support.     ADLs/IADLs:  Pt is CGA for UB cares, MOD A for LB dressing, MIN A for bathing and CGA/MIN A for transfers and ambulation with walker. Pt significantly limited by fatigue and SOB with activity. Pt tremors in UE and full body impact ADL performance as well. Pt has good support and will discharge to [facility] initially with HH.    Mobility: Making slow progress towards goals. Still biggest  barrier is fatigue and very poor activity tolerance.  Bed Mobility: Min A  Transfer: CGA with WW  Gait: CGA x 15 ft with WW  Stairs: 5 step ups with min A  Balance: Multiple LOB during session, recommending CGA at all times    Cognition/Language:Pt seen at bedside for cognitive linguistic assessment. Overall, pt scored mild cognitive impairments with moderate deficits re: attention, exeuctive function, and memory. Pt visuospatial skills also impacted and moderately impaired. Pt with mild visual impairments likely impacting overall function. IND PLOF included working (), managing finances, appts, and medication. Pt presenting with below baseline level of function and would benefit from skilled treatment to treat cognitive impairments through structural and functional tasks.      Plan of Care and goals reviewed and updated.    Discharge Plan/Recommendations    Fall Precautions: continue    Overall plan for the patient:   Medicine, pulmonary, and endocrine teams assistance greatly appreciated for medical management.  Functionally, is very deconditioned and tachycardic and dyspneic with activity, especially standing.  Able to ambulate up to 15 ft before he requires a several minute long rest break.  CGA for seated ADLs.  Noted decreased safety awareness and anxiety present. Mild to mod cog deficits present with decreased attention, executive functioning, and memory.  On regular diet with thins, but appetite is poor and needs supplemental tube feeds but weaning down as able.  Tentative discharge date 12/30, although given current trajectory anticipate will need longer and will re-evaluate next week.        Utilization Review and Continued Stay Justification    Medical Necessity Criteria:    For any criteria that is not met, please document reason and plan for discharge, transfer, or modification of plan of care to address.    Requires intensive rehabilitation program to treat functional deficits?:  Yes    Requires 3x per week or greater involvement of rehabilitation physician to oversee rehabilitation program?: Yes    Requires rehabilitation nursing interventions?: Yes    Patient is making functional progress?: Yes    There is a potential for additional functional progress? Yes    Patient is participating in therapy 3 hours per day a minimum of 5 days per week or 15 hours per week in 7 day period?:Yes    Has discharge needs that require coordinated discharge planning approach?:Yes      Barriers/Concerns related to meeting medical necessity criteria:  Significant deconditioning    Team Plan to Address Concern:  Ongoing therapies to appropriate patient and medical tolerance      Final Physician Sign off    Statement of Approval: I have reviewed and agree with the recommendations and documentation in this care conference note.       Patient Goals  Social Work Goals: Confirm discharge recommendations with therapy, coordinate safe discharge plan and remain available to support and assist as needed.     OT Frequency: daily 60-75 min  OT goal: hygiene/grooming: modified independent  OT goal: upper body dressing: Modified independent  OT goal: lower body dressing: Modified independent  OT goal: upper body bathing: Supervision/stand-by assist  OT goal: lower body bathing: Supervision/stand-by assist  OT Goal: transfer: Supervision/stand-by assist (tub transfer)  OT goal: toilet transfer/toileting: Modified independent,toilet transfer,cleaning and garment management  OT goal: meal preparation: Modified independent,with simple meal preparation  OT goal: cognitive: Patient/caregiver will verbalize understanding of cognitive assessment results/recommendations as needed for safe discharge planning  OT goal 1: Pt will be IND with UB HEP to improve BUE function for ADL     PT Frequency: daily 60-90 minutes  PT goal: bed mobility: Supine to/from sit,Independent  PT goal: transfers: Modified independent,Bed to/from chair,Sit  to/from stand (WW)  PT goal: gait: Modified independent,Rolling walker,100 feet  PT goal: stairs: Modified independent,Greater than 10 stairs (12 stairs with rail per home setup)  PT goal 1: Car transfer into family vehicle, SBA using WW         SLP Frequency: daily  SLP Swallow Goal:  Safely tolerate diet without signs/symptoms of aspiration: Regular diet,Thin liquids,With use of swallow precautions   SLP goal 1: Pt will demonstrate and initiate a plan within a structured task with 90% accuracy and min cues  SLP goal 2: Pt will implement strategies to increase attention to visual and verbal information through strutcured and functional tasks with 90% accuracy and min cues     RN Goal: Medication Management: Pt will demonstrate ability to manage medications safely before discharge by completing MAP as needed.  RN Goal: Skin Integrity: Pt will demonstrate understanding of maintaining skin integrity by turning and repositioning frerquently and asking for assistance as needed.  RN Goal: Carryover of Rehab Techniques: Pt will utilize therapy techniques nikki as independent as possible prior to discharge.  RN Goal: Safety Management: Pt will make needs known and use call light appropriately to mobilize prior to discharge.

## 2021-12-17 NOTE — PLAN OF CARE
Individualized Overall Plan Of Care (IOPOC)      Rehab diagnosis/Impairment Group Code: Stroke ischemic 01.3 bilateral involvement: embolic cva affecting b cerebral hemispheres and l cerebellum, as well as s/p b lung transplantation c/b acute hypoxic respiratory failure s/p tracheostomy (now decannulated)  Ischemic stroke (h)       Expected functional outcome: Mod I for ambulation, mobility, ADLs     Clinical Impression Comments: Participating with therapies    Mobility: Pt is below baseline for functional mobility s/p BSLT and prolonged hospital stay. He is on RA with SpO2 >95% during session. Severly limited endurance and SOB with even mild exertion. ELOS 2-3 weeks to improve durability and IND in order to d/c to [email or web address].     ADL: Pt is below basdeline due to poor activity tolerance, strength, cognition, coordination, visualperceptual deficits and will benefit from skilled OT to improve his function and independence    Communication/Cognition/Swallow: Pt seen at bedside for cognitive linguistic assessment. Completed CLQT, see report for breakdown of scores. Overall, pt scored mild cognitive impairments with moderate deficits re: attention, exeuctive function, and memory. Pt visuospatial skills also impacted and moderately impaired. Pt with mild visual impairments likely impacting overall function. IND PLOF included working (), managing finances, appts, and medication. Pt presenting with below baseline level of function and would benefit from skilled speech therapy to treat cognitive impairments through structural and functional tasks.      Intensity of therapy:   PT 60 minutes, Daily, for 17 days  OT 60 minutes, Daily, for 17 days  SLP 60 minutes, daily, for 17 days    Orthotics None  Education tube feeding and wound care  Neuropsychology Testing: No  Other:  None      Medical Prognosis: Fair      Physician summary statement: Participating well but limited by fatigue and dyspnea.  Medicine,  pulmonary, and endocrine teams following along.  Continue with PT/OT/SLP.      Discharge destination: [facility]  Discharge rehabilitation needs: home care, RN, PT, OT and SLP      Estimated length of stay: 17 days      Rehabilitation Physician Johnny Coronado MD

## 2021-12-17 NOTE — PROGRESS NOTES
Diabetes Consult Daily  Progress Note          Assessment/Plan:     HPI:  Mr. 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     IDS paged today to re-follow and adjust insulin pending a reduction in TF - see RD noted.     Assessment:     1)  steroid induced Diabetes Mellitus, controlled (A1c 6.6 prior to transplant), with TF and steroid induced hyperglycemia  2)  tube feeding induced hyperglycemia - plan for reduction today  3)  S/P BSLT 10/16/21        Plan:                  - Decrease NPH 27 units at start of TF cycle                 - Decrease AM Lantus to 11 units                 - Novolog high resistance sliding scale change to 5x, 0200, 0800, 1200, 1800, 2200 - during TF times                 - Decrease Novolog prandial to 1:17g CHO with meals, snacks,                  - BG monitoring QAC, HS                  - hypoglycemia protocol                 - diabetes education needs will be assessed closer to discharge, when TF plan solidified                 - on discharge, will recommend outpatient follow up with [facility] service or endocrinology closer to home in [place], [place]    Plan discussed with patient, bedside RN, and primary team via this note.           Interval History:     The last 24 hours progress and nursing notes reviewed.      BG trend:      Call from team - plan is to reduce TF today from 183 g cho to 110 g cho.  40% reduction   Ratio will remain the same at 0.25 as it was previously 45 unit(s) /183 g cho = 0.245  27 unit(s) / 110 g cho = 0.245    Bret has no concerns  "over the change. Advised of the anticipated changes with the new TF plan.      Recent Labs   Lab 12/17/21  1200 12/17/21  0819 12/17/21  0200 12/16/21 2036 12/16/21  1912 12/16/21  1655   * 107* 154* 112* 254* 112*           Nutrition:     Orders Placed This Encounter      Regular Diet Adult Thin Liquids (level 0)          PTA Regimen:     Plan/Recommendations per RD today 12/17:  RD changed cycled J-TF to Nepro w/carbsteady at 60 ml/hr x12 hrs from 6 pm to 6 am with 100 ml water flush q 4 hrs to provide 1296 kcals, 58 gm protein, 110 gm CHO, 9 gm fiber, 1123 ml water/day - TF now reduced to ~63% lower end kcal needs, ~70% lower end protein needs. Re-ordered calorie count x3 more days, continue diet and supplement orders as ordered.           Review of Systems:   CC:  denies    Constitutional:   No fever/chills  ENT/Mouth:   No hearing changes, no ear pain, no sore throat, no rhinorrhea, no difficulty swallowing  Eyes:  No eye pain, no discharge, no vision changes  CV:   No CP/SOB, no new edema  Resp:  No cough, no wheezing  GI:   No N/V, denies constipation, denies diarrhea  :  No dysuria, frequency, or hematuria  Musk:  No new joint swelling/pain, no back pain  Skin/heme:   No new rashes/bruises/open areas.  No pruritis  Neuro:   No new weakness, denies numbness/tingling, no headache  Psych:   No new anxiety, denies mood changes or depression  Endocrine:  No new polyuria or polydipsia         Medications:   Steroid planning:  Prednisone 10 mg BID       Physical Exam:   BP (!) 149/76 (BP Location: Right arm)   Pulse 103   Temp 96.9  F (36.1  C) (Oral)   Resp 20   Ht 1.646 m (5' 4.8\")   Wt 68.3 kg (150 lb 9.6 oz)   SpO2 95%   BMI 25.22 kg/m      General:   NAD, pleasant  Lungs:  unremarkable, no new cough, no SOB  Mental Status:  Baseline, alert and oriented x3  Psych:   Cooperative, good eye contact, full/appropriate affect          Data:     Lab Results   Component Value Date    A1C 5.3 11/15/2021 "    A1C 6.6 09/07/2021    A1C 6.5 09/05/2021            CBC RESULTS:   Recent Labs   Lab Test 12/17/21  0759   WBC 11.6*   RBC 3.48*   HGB 9.9*   HCT 31.2*   MCV 90   MCH 28.4   MCHC 31.7   RDW 14.0        Recent Labs   Lab Test 12/17/21  1200 12/17/21  0819 12/16/21  0831 12/16/21  0705 12/15/21  1704 12/15/21  1139 12/14/21  1142 12/14/21  1106   NA  --   --   --  137  --   --   --  135   POTASSIUM  --   --   --  4.8  --  4.4  --  5.0   CHLORIDE  --   --   --  106  --   --   --  101   CO2  --   --   --  27  --   --   --  25   ANIONGAP  --   --   --  4  --   --   --  9   * 107*   < > 90   < >  --    < > 115*   BUN  --   --   --  29  --   --   --  33*   CR  --   --   --  0.74  --   --   --  0.82   ERIK  --   --   --  9.5  --   --   --  9.9    < > = values in this interval not displayed.     Liver Function Studies -   Recent Labs   Lab Test 12/12/21  0519   PROTTOTAL 5.8*   ALBUMIN 2.5*   BILITOTAL 0.2   ALKPHOS 104   AST 17   ALT 26     Lab Results   Component Value Date    INR 2.77 12/17/2021    INR 2.64 12/16/2021    INR 2.41 12/15/2021    INR 2.25 12/14/2021    INR 2.57 12/13/2021    INR 2.52 12/12/2021    INR 2.24 12/11/2021    INR 2.18 12/10/2021    INR 2.18 12/09/2021    INR 2.52 12/08/2021    INR 2.51 12/07/2021    INR 2.35 12/06/2021         ILIR Contreras CNP   Inpatient Diabetes Management Service  Pager - [phone]  Friday - Monday 0800 - 1600 hrs    To contact Endocrine Diabetes service:   From 8AM-4PM: page inpatient diabetes provider that is following the patient that day (see filed or incomplete progress notes/consult notes).  If uncertain of provider assignment: page job code [phone].  For questions or updates from 4PM-8AM: page the diabetes job code for on call fellow: [phone]    Please notify inpatient diabetes service if changes are planned to steroids, nutrition, or if procedures are planned requiring prolonged NPO status.Diabetes Management Team job code: [phone]    I spent a total  of 35 minutes on the date of the encounter doing chart review, history and exam, documentation and further activities per the note.  Over 50% of my time on the unit was spent counseling the patient and/or coordinating care regarding acute hyperglycemic management.  See note for details.

## 2021-12-17 NOTE — PLAN OF CARE
FOCUS/GOAL  Bowel management, Bladder management, Pain management, Mobility, Skin integrity, and Safety    ASSESSMENT, INTERVENTIONS AND CONTINUING PLAN FOR GOAL:  A/O, VS stable. Regular/thin, pills whole, supplemental tube feed overnight. Continent of bowel and bladder. No complaints of pain this shift. Assist of 1 with walker to transfer. No new skin concerns. Calling appropriately with light. Continue POC. PICC in arm flushing with blood return in all lumens.

## 2021-12-18 ENCOUNTER — APPOINTMENT (OUTPATIENT)
Dept: PHYSICAL THERAPY | Facility: CLINIC | Age: 56
End: 2021-12-18
Attending: PHYSICAL MEDICINE & REHABILITATION
Payer: COMMERCIAL

## 2021-12-18 ENCOUNTER — APPOINTMENT (OUTPATIENT)
Dept: SPEECH THERAPY | Facility: CLINIC | Age: 56
End: 2021-12-18
Attending: PHYSICAL MEDICINE & REHABILITATION
Payer: COMMERCIAL

## 2021-12-18 ENCOUNTER — APPOINTMENT (OUTPATIENT)
Dept: OCCUPATIONAL THERAPY | Facility: CLINIC | Age: 56
End: 2021-12-18
Attending: PHYSICAL MEDICINE & REHABILITATION
Payer: COMMERCIAL

## 2021-12-18 LAB
GLUCOSE BLDC GLUCOMTR-MCNC: 117 MG/DL (ref 70–99)
GLUCOSE BLDC GLUCOMTR-MCNC: 127 MG/DL (ref 70–99)
GLUCOSE BLDC GLUCOMTR-MCNC: 128 MG/DL (ref 70–99)
GLUCOSE BLDC GLUCOMTR-MCNC: 95 MG/DL (ref 70–99)
GLUCOSE BLDC GLUCOMTR-MCNC: 98 MG/DL (ref 70–99)
HOLD SPECIMEN: NORMAL
HOLD SPECIMEN: NORMAL
INR PPP: 2.66 (ref 0.85–1.15)

## 2021-12-18 PROCEDURE — 250N000009 HC RX 250: Performed by: INTERNAL MEDICINE

## 2021-12-18 PROCEDURE — 97110 THERAPEUTIC EXERCISES: CPT | Mod: GO

## 2021-12-18 PROCEDURE — 97130 THER IVNTJ EA ADDL 15 MIN: CPT | Mod: GN | Performed by: SPEECH-LANGUAGE PATHOLOGIST

## 2021-12-18 PROCEDURE — 36415 COLL VENOUS BLD VENIPUNCTURE: CPT | Performed by: INTERNAL MEDICINE

## 2021-12-18 PROCEDURE — 97530 THERAPEUTIC ACTIVITIES: CPT | Mod: GP

## 2021-12-18 PROCEDURE — 94640 AIRWAY INHALATION TREATMENT: CPT

## 2021-12-18 PROCEDURE — 250N000013 HC RX MED GY IP 250 OP 250 PS 637: Performed by: PHYSICAL MEDICINE & REHABILITATION

## 2021-12-18 PROCEDURE — 250N000009 HC RX 250: Performed by: PHYSICAL MEDICINE & REHABILITATION

## 2021-12-18 PROCEDURE — 97535 SELF CARE MNGMENT TRAINING: CPT | Mod: GO

## 2021-12-18 PROCEDURE — 128N000003 HC R&B REHAB

## 2021-12-18 PROCEDURE — 250N000011 HC RX IP 250 OP 636: Performed by: PHYSICAL MEDICINE & REHABILITATION

## 2021-12-18 PROCEDURE — 250N000012 HC RX MED GY IP 250 OP 636 PS 637: Performed by: INTERNAL MEDICINE

## 2021-12-18 PROCEDURE — 999N000157 HC STATISTIC RCP TIME EA 10 MIN

## 2021-12-18 PROCEDURE — 97110 THERAPEUTIC EXERCISES: CPT | Mod: GP

## 2021-12-18 PROCEDURE — 94640 AIRWAY INHALATION TREATMENT: CPT | Mod: 76

## 2021-12-18 PROCEDURE — 99232 SBSQ HOSP IP/OBS MODERATE 35: CPT | Performed by: INTERNAL MEDICINE

## 2021-12-18 PROCEDURE — 250N000013 HC RX MED GY IP 250 OP 250 PS 637: Performed by: INTERNAL MEDICINE

## 2021-12-18 PROCEDURE — 85610 PROTHROMBIN TIME: CPT | Performed by: INTERNAL MEDICINE

## 2021-12-18 PROCEDURE — 97129 THER IVNTJ 1ST 15 MIN: CPT | Mod: GN | Performed by: SPEECH-LANGUAGE PATHOLOGIST

## 2021-12-18 RX ORDER — WARFARIN SODIUM 1 MG/1
2 TABLET ORAL
Status: COMPLETED | OUTPATIENT
Start: 2021-12-18 | End: 2021-12-18

## 2021-12-18 RX ADMIN — AMLODIPINE BESYLATE 5 MG: 5 TABLET ORAL at 08:39

## 2021-12-18 RX ADMIN — LEVALBUTEROL HYDROCHLORIDE 1.25 MG: 1.25 SOLUTION RESPIRATORY (INHALATION) at 08:29

## 2021-12-18 RX ADMIN — FLUTICASONE PROPIONATE 1 SPRAY: 50 SPRAY, METERED NASAL at 08:41

## 2021-12-18 RX ADMIN — PREDNISONE 10 MG: 10 TABLET ORAL at 22:00

## 2021-12-18 RX ADMIN — NYSTATIN 1000000 UNITS: 100000 SUSPENSION ORAL at 08:40

## 2021-12-18 RX ADMIN — ESCITALOPRAM OXALATE 20 MG: 20 TABLET ORAL at 08:40

## 2021-12-18 RX ADMIN — FUROSEMIDE 40 MG: 40 TABLET ORAL at 08:40

## 2021-12-18 RX ADMIN — CEFTAZIDIME 2 G: 2 INJECTION, POWDER, FOR SOLUTION INTRAVENOUS at 12:15

## 2021-12-18 RX ADMIN — LEVALBUTEROL HYDROCHLORIDE 1.25 MG: 1.25 SOLUTION RESPIRATORY (INHALATION) at 15:10

## 2021-12-18 RX ADMIN — NYSTATIN 1000000 UNITS: 100000 SUSPENSION ORAL at 16:38

## 2021-12-18 RX ADMIN — PANTOPRAZOLE SODIUM 40 MG: 40 TABLET, DELAYED RELEASE ORAL at 06:43

## 2021-12-18 RX ADMIN — WARFARIN SODIUM 2 MG: 1 TABLET ORAL at 17:58

## 2021-12-18 RX ADMIN — Medication 800 MG: at 08:39

## 2021-12-18 RX ADMIN — INSULIN ASPART 10 UNITS: 100 INJECTION, SOLUTION INTRAVENOUS; SUBCUTANEOUS at 12:15

## 2021-12-18 RX ADMIN — SULFAMETHOXAZOLE AND TRIMETHOPRIM 1 TABLET: 400; 80 TABLET ORAL at 08:40

## 2021-12-18 RX ADMIN — NYSTATIN 1000000 UNITS: 100000 SUSPENSION ORAL at 12:15

## 2021-12-18 RX ADMIN — TACROLIMUS 2.5 MG: 1 CAPSULE ORAL at 08:40

## 2021-12-18 RX ADMIN — CALCIUM CARBONATE 600 MG (1,500 MG)-VITAMIN D3 400 UNIT TABLET 1 TABLET: at 08:39

## 2021-12-18 RX ADMIN — Medication 25 MG: at 22:00

## 2021-12-18 RX ADMIN — ROSUVASTATIN CALCIUM 10 MG: 10 TABLET, FILM COATED ORAL at 08:40

## 2021-12-18 RX ADMIN — MYCOPHENOLATE MOFETIL 1000 MG: 250 CAPSULE ORAL at 08:39

## 2021-12-18 RX ADMIN — ACETYLCYSTEINE 2 ML: 200 SOLUTION ORAL; RESPIRATORY (INHALATION) at 08:31

## 2021-12-18 RX ADMIN — QUETIAPINE FUMARATE 50 MG: 50 TABLET ORAL at 22:00

## 2021-12-18 RX ADMIN — CEFTAZIDIME 2 G: 2 INJECTION, POWDER, FOR SOLUTION INTRAVENOUS at 19:53

## 2021-12-18 RX ADMIN — MULTIPLE VITAMINS W/ MINERALS TAB 1 TABLET: TAB at 08:39

## 2021-12-18 RX ADMIN — CALCIUM CARBONATE 600 MG (1,500 MG)-VITAMIN D3 400 UNIT TABLET 1 TABLET: at 17:58

## 2021-12-18 RX ADMIN — Medication 1 PACKET: at 22:00

## 2021-12-18 RX ADMIN — Medication 400 MG: at 17:58

## 2021-12-18 RX ADMIN — LEVOTHYROXINE SODIUM 25 MCG: 25 TABLET ORAL at 08:40

## 2021-12-18 RX ADMIN — MYCOPHENOLATE MOFETIL 1000 MG: 250 CAPSULE ORAL at 22:00

## 2021-12-18 RX ADMIN — LEVALBUTEROL HYDROCHLORIDE 1.25 MG: 1.25 SOLUTION RESPIRATORY (INHALATION) at 20:30

## 2021-12-18 RX ADMIN — ACETYLCYSTEINE 2 ML: 200 SOLUTION ORAL; RESPIRATORY (INHALATION) at 20:30

## 2021-12-18 RX ADMIN — CEFTAZIDIME 2 G: 2 INJECTION, POWDER, FOR SOLUTION INTRAVENOUS at 04:27

## 2021-12-18 RX ADMIN — TACROLIMUS 2 MG: 1 CAPSULE ORAL at 17:58

## 2021-12-18 RX ADMIN — Medication 1 PACKET: at 08:42

## 2021-12-18 RX ADMIN — NYSTATIN 1000000 UNITS: 100000 SUSPENSION ORAL at 20:01

## 2021-12-18 RX ADMIN — Medication 1 TABLET: at 08:39

## 2021-12-18 RX ADMIN — ACETYLCYSTEINE 2 ML: 200 SOLUTION ORAL; RESPIRATORY (INHALATION) at 15:11

## 2021-12-18 RX ADMIN — INSULIN ASPART 3 UNITS: 100 INJECTION, SOLUTION INTRAVENOUS; SUBCUTANEOUS at 18:38

## 2021-12-18 RX ADMIN — PANTOPRAZOLE SODIUM 40 MG: 40 TABLET, DELAYED RELEASE ORAL at 16:38

## 2021-12-18 RX ADMIN — PREDNISONE 10 MG: 10 TABLET ORAL at 08:39

## 2021-12-18 RX ADMIN — FLUCONAZOLE, SODIUM CHLORIDE 400 MG: 2 INJECTION INTRAVENOUS at 10:04

## 2021-12-18 ASSESSMENT — ACTIVITIES OF DAILY LIVING (ADL)
ADLS_ACUITY_SCORE: 25

## 2021-12-18 NOTE — PLAN OF CARE
Discharge Planner Post-Acute Rehab SLP:      Discharge Plan: [facility] with fiance      Precautions: aspiration precautions, sternal, fall     Current Status:  Communication: WFL. Mildly breathy, trach stoma covered.  Cognition: Mild-moderate cognitive impairments with deficits re: attention, exeuctive function, memory, and visuospatial skills.  Swallow: Regular, thin liquid. Medications to be given orally with thin liquids. Will continue to monitor.      Assessment:  SLP: worked on planning, reasoning  needing min cues for strategies, correcting errors, occasional visual perceptual difficulty. Engaged in memory task, matching moderate complex level minimal difficulty wtih recall.   -10 minutes pt engaged in phone call     Other Barriers to Discharge (Family Training, etc):

## 2021-12-18 NOTE — PROGRESS NOTES
[facility]   Acute Rehabilitation Unit  Daily progress note    INTERVAL HISTORY  No acute events overnight. Patient reports frequent interruptions overnight for cares contributing to poor sleep. His shortness of breath and dyspnea on exertion are better today and closer to his baseline. Continues to have baseline cough and pain at L chest tube site. Minimal appetite per baseline but hopeful that TF changes will help him to eat more. Denies palpitation, abdominal pain, further concerns. Per therapies, performance has improved back to baseline.     MEDICATIONS  Scheduled meds    acetaminophen  975 mg Oral or Feeding Tube Q8H     acetylcysteine  2 mL Nebulization TID     amLODIPine  5 mg Oral Daily     calcium carbonate 600 mg-vitamin D 400 units  1 tablet Oral or Feeding Tube BID w/meals     cefTAZidime  2 g Intravenous Q8H     escitalopram  20 mg Oral or Feeding Tube Daily     fiber modular (NUTRISOURCE FIBER)  1 packet Per Feeding Tube BID     fluconazole  400 mg Intravenous Q24H     fluticasone  1 spray Both Nostrils Daily     folic acid-vit B6-vit B12  1 tablet Oral Daily     furosemide  40 mg Oral Daily     insulin aspart  1-12 Units Subcutaneous 5 times per day     insulin aspart   Subcutaneous TID w/meals     insulin glargine  11 Units Subcutaneous QAM     insulin NPH  27 Units Subcutaneous Q24H     levalbuterol  1.25 mg Nebulization TID     levothyroxine  25 mcg Oral Daily     Lidocaine  2 patch Transdermal Q24H     lidocaine   Transdermal Q8H     magnesium oxide  400 mg Oral QPM     magnesium oxide  800 mg Oral QAM     multivitamin w/minerals  1 tablet Oral Daily     mycophenolate  1,000 mg Oral BID     nystatin  1,000,000 Units Swish & Swallow 4x Daily     pantoprazole  40 mg Oral BID AC     predniSONE  10 mg Oral or Feeding Tube BID     QUEtiapine  50 mg Oral At Bedtime     rosuvastatin  10 mg Oral or Feeding Tube Daily     sodium chloride (PF)  10-40 mL Intracatheter  "Q8H     sulfamethoxazole-trimethoprim  1 tablet Oral or Feeding Tube Daily     tacrolimus  2 mg Oral QPM     tacrolimus  2.5 mg Oral QAM       PRN meds:  albuterol, bisacodyl, dextrose, glucose **OR** dextrose **OR** glucagon, hydrOXYzine, insulin aspart, lidocaine (viscous), magnesium hydroxide, melatonin, naloxone **OR** naloxone **OR** naloxone **OR** naloxone, ondansetron, oxyCODONE, - MEDICATION INSTRUCTIONS -, prochlorperazine, QUEtiapine, senna-docusate, sodium chloride (PF), traZODone, Warfarin Therapy Reminder, zinc-white petrolatum      PHYSICAL EXAM  /80 (BP Location: Right arm)   Pulse 85   Temp 97.9  F (36.6  C) (Oral)   Resp 20   Ht 1.646 m (5' 4.8\")   Wt 68.3 kg (150 lb 9.6 oz)   SpO2 91%   BMI 25.22 kg/m    Gen: Awake, coooperative  HEENT: atraumatic, no discharge from nares or ears, EOM intact  Cardio: Borderline tachycardic, no murmur auscultated  Pulm: Non-labored breathing, CTAB with diminished sounds at R lung base  Abd: Soft, non-tender to palpation, bowel sounds present. PEG c/d/i  Ext: No edema in lower or upper extremities b/l, no calf tenderness  Neuro/MSK: alert, oriented, no new slurring of words, answers questions appropriately, follows commands. Increased tremor from prior      LABS  Recent Labs   Lab 12/18/21  0808 12/18/21  0635 12/18/21  0215 12/17/21  2129 12/17/21  0819 12/17/21  0759 12/16/21  1655 12/16/21  1611 12/16/21  0831 12/16/21  0707 12/16/21  0705 12/15/21  1704 12/15/21  1139 12/15/21  0739 12/15/21  0647 12/14/21  1142 12/14/21  1106 12/12/21  0933 12/12/21  0519   WBC  --   --   --   --   --  11.6*  --  15.8*  --   --   --   --   --   --  11.0  --  13.2*  --  9.7   HGB  --   --   --   --   --  9.9*  --  10.6*  --   --   --   --   --   --  10.2*  --  11.5*  --  9.8*   MCV  --   --   --   --   --  90  --  89  --   --   --   --   --   --  90  --  90  --  93   PLT  --   --   --   --   --  171  --  199  --   --   --   --   --   --  193  --  218  --  144*   INR "  --  2.66*  --   --   --  2.77*  --   --   --  2.64*  --   --   --   --  2.41*  --  2.25*   < > 2.52*   NA  --   --   --   --   --   --   --   --   --   --  137  --   --   --   --   --  135  --  141   POTASSIUM  --   --   --   --   --   --   --   --   --   --  4.8  --  4.4  --   --   --  5.0  --  4.5   CHLORIDE  --   --   --   --   --   --   --   --   --   --  106  --   --   --   --   --  101  --  108   CO2  --   --   --   --   --   --   --   --   --   --  27  --   --   --   --   --  25  --  26   BUN  --   --   --   --   --   --   --   --   --   --  29  --   --   --   --   --  33*  --  34*   CR  --   --   --   --   --   --   --   --   --   --  0.74  --   --   --   --   --  0.82  --  1.00   ANIONGAP  --   --   --   --   --   --   --   --   --   --  4  --   --   --   --   --  9  --  7   ERIK  --   --   --   --   --   --   --   --   --   --  9.5  --   --   --   --   --  9.9  --  8.6   GLC 95  --  128* 96   < >  --    < >  --    < >  --  90   < >  --    < >  --    < > 115*   < > 167*   ALBUMIN  --   --   --   --   --   --   --   --   --   --   --   --   --   --   --   --   --   --  2.5*   PROTTOTAL  --   --   --   --   --   --   --   --   --   --   --   --   --   --   --   --   --   --  5.8*   BILITOTAL  --   --   --   --   --   --   --   --   --   --   --   --   --   --   --   --   --   --  0.2   ALKPHOS  --   --   --   --   --   --   --   --   --   --   --   --   --   --   --   --   --   --  104   ALT  --   --   --   --   --   --   --   --   --   --   --   --   --   --   --   --   --   --  26   AST  --   --   --   --   --   --   --   --   --   --   --   --   --   --   --   --   --   --  17    < > = values in this interval not displayed.     No results found for this or any previous visit (from the past 24 hour(s)).    ASSESSMENT AND PLAN    Edson Thornton is a 56 year old right hand dominant male with a hx of NSIP/ILD associated with rheumatoid lung disease, hypertension, hyperlipidemia, RA who initially presented  9/5/2021 with acute on chronic respiratory failure for transplant work-up s/p bilateral lung transplant (10/16/21) with prolonged hospitalization associated with multifocal bilateral acute/subacute ischemic infarcts of bilateral frontal, occipital lobes.  Postoperative course complicated by prolonged ventilator wean s/p trach and PEG placement (10/29), encephalopathy, new Afib with RVR, BRENNAN, candidemia/Candida empyema, and brief bradycardic arrest with subsequent hypotension in the setting of GI bleed.  [facility] and was decannulated 12/8/2021.  He is currently stable on room air at admission to acute rehab.  He presents with right upper extremity weakness, bilateral dysmetria/tremors, acute on chronic severe deconditioning, dyspnea on exertion, and impairment of ADLs and gait.     Admission to acute inpatient rehab 12/13/2021  .    Impairment group code: Stroke Ischemic 01.3 Bilateral Involvement: embolic CVA affecting B cerebral hemispheres and L cerebellum, as well as s/p bilateral lung transplantation     1. PT, OT 90 minutes of each on a daily basis, in addition to rehab nursing and close management of physiatrist.   2. Impairment of ADLs/IADLs:  OT for 60 min daily to work on upper and lower body self care, dressing, toileting, bathing, energy conservation techniques with use of ADs as needed.   3. Impairment of mobility:   PT for 60 min daily to work on gait exercises, strengthening, endurance buildup, transfers with use of walker as needed.   4. Impairment of cognition:  SLP for 60 minutes for cognitive evaluation and treatment strategies for higher level cognitive deficits, and memory impairment.  5. Rehab RN to administer medication, patient education on medication taking, VS monitoring, bowel regimen, glucose monitoring and wound care/surgical wound dressing changes and monitoring.   6. Medical Conditions - Hospital ist team consulted, appreciate  assistance     Neuro/Psych  #Multifocal acute to subacute ischemic stroke, etiology likely embolic vs perioperative  Initially noted 10/22 and 11/6 with stroke code called for change in exam.  MRI brain 10/23 with infarcts noted in both cerebral hemispheres (R frontal, L corona radiata, bilateral occipital), left cerebellum, presumed associated with new Afib vs perioperative. Bilateral upper extremity paresis (R>L), suspicion for some cortical blindness  - Stroke risk factor management:              -Warfarin anticoagulation for Afib, pharmacy to dose              -BP goals: SBP<140              - Rosuvastatin 10 mg qday              -Not smoking              -Hgb A1c: 6.6     #Pain  -Acetaminophen 975mg q8h  -Lidocaine patch  -Menthol patch     #Encephalopathy  #Sleep  -Delirium precautions  -Seroquel 50 mg at bedtime, 25mg TID prn; scheduled 25mg discontinued 12/14, continue to wean as able  -Melatonin 10mg qhs  -Trazodone 25mg prn     #Mood  #History of anxiety and depression  - PTA Lexapro increased to 10 mg while in acute hospital, increased to 20mg 12/14  -Hydroxyzine as needed         Pulmonary  #ILD, NISP, s/p bilateral lung transplant (10/16/2021)  #Acute on chronic respiratory failure s/p tracheostomy (10/29/21) and decannulation (12/8/21)  #Bilateral Pleural Effusions  -Sternal precautions until 12 wks post op (1/8/2022)  -Immunosuppression:              -s/p basiliximab on 10/16, 10/20              -Continue tacrolimus 2.5mg qam and 2mg qhs, goal 8-12              -Continue MMF 1000mg BID              -Continue prednisone 10mg BID  -Monitoring lab/imaging              -DSA q2wks              -Monthly Coccidioides Ag due 1/17              -CMV, EBV per transplant team              -CXR, CBC, BMP, Tac level M/Th  -Continue Levalbuterol and mucomyst nebs QID  -Aggressive pulmonary toilet and chest PT QID  -Continue Lasix 40mg qday     #SALTY  -PTA CPAP     ID  #Immunosuppression Prophylaxsis   -Bactrim x6  months  -Nystatin x6 months     #L lung cavitary lesion  #Hospital-acquired pneumonia with Klebsiella pneumoniae, Pseudomonas fluorescens/putida  Suspected aspiration versus pseudomonal vs Klebsiella pneumonia related to abscess.  CT on 1025 with left lower lobe nodular opacity with repeat on 11/22 showing new cavitary lesion in left lung base, multifocal bilateral upper lobe groundglass opacity, and new 1.8 cm fluid collection with surrounding fat stranding in left axilla, decreased bilateral loculated pleural effusions.  QuantiFERON gold indeterminate, negative tuberculin skin test on multiple occurrences.  Underwent BAL on 11/22 with cultures growing Klebsiella and Pseudomonas fluorescens/putida, resistant to meropenem.  ID was consulted and feels this is likely fungal.  B-D glucan positive with known candidemia.  Cocci antigen in urine negative, serum histoplasmosis negative, CRAG negative  -IV ceftazidime 2g q8hrs (end 12/21)  -Follow-up chest CT 12/21 and will discuss with ID/Dr. Abebe after     #Invasive Candida albicans candidiasis  Positive Candida blood cultures 10/20, 10/22.  Fungitell positive (399).  TC 10/23 without evidence of endocarditis. Ophthalmic consulted 10/24, benign funduscopic exam.  Candida empyema 10/25.  Repeated pleural effusion fungal cultures and fungal/bacterial blood cultures have been without growth since 11/18.  Initially on micafungin (10/22-10/27) before transition to fluconazole IV  -Continue IV Fluconazole 400mg daily (10/26-12/21)     #hx of HSV  Chronic intermittent infection pretransplant, with recent HSV infection while in acute hospital (crusted lesions along left jaw) s/p 10d treatment until 10/9.     Cardiovascular  #Afib  Initially noted 10/18, converted to NSR with amio ggt. Metoprolol and amiodarone discontinued due to bradycardia. INR 2.66 12/18  -Warfarin as dosed by pharmacy     #Hypertension  -PTA amlodipine 5 mg daily     Heme  #Leukocytosis  New leukocytosis to  13.2 on 12/14--> 11.0 12/15 -->15.8 12/16 --> 11.6 12/17. Given prednisone, likely sits upper limit of normal. Patient with worsening dyspnea on exertion, SOB on 12/16 but CXR equivocal and remains afebrile. Already on abx/antifungals. May be stress response to when patient becomes more anxious/agitated. Continue to monitor CBCs and for signs/symptoms of infection     #R subclavian DVT  -Warfarin anticoagulation, pharmacy to dose     #Hypogammaglobulinemia  S/p IVIG  -Repeat IgG 12/15 365  -Repeat ~12/30     Endocrine  #Type 2 diabetes  #Steroid-induced hyperglycemia  -Endocrine consulted while in acute hospital. Reconsulted 12/14 with anticipation of transitioning TF, appreciate recs. Discussed TF decrease, recs adjusted 12/18                 - Decrease NPH 27 units at start of TF cycle                 - Decrease AM Lantus to 11 units                 - Novolog high resistance sliding scale change to 5x, 0200, 0800, 1200, 1800, 2200 - during TF times                 - Decrease Novolog prandial to 1:17g CHO with meals, snacks,                  - BG monitoring QAC, HS                  - hypoglycemia protocol                 - diabetes education needs will be assessed closer to discharge, when TF plan solidified                 - on discharge, will recommend outpatient follow up with [facility] service or endocrinology closer to home in [place], [place]     #Hypothyroidism  TSH 3.17 on 11/19  -Continue levothyroxine 25 mcg daily     Renal  #BRENNAN, resolved  Cr 0.82 12/14  -Monitor intermittently     MSK/Rheum  #Rheumatoid arthritis  Previously treated with rituximab.  Rheumatology familiar and consulted early in admission.  -Steroids as above     GI/FEN  #Severe malnutrition  Patient currently has PEG in place, receiving majority of nutrition through cycled tube feeds. Has had borderline K (4.8-5.0) and transitioned to renal formulation.   -Continue multivitamin, folate, B6, B12 supplements  -TF per dietician,  appreciate assistance.  Currently cycled, adjusting formula/amount 12/17  -Encourage p.o. intake     #hx of GI Bleed  Patient with progressive hypotension and CODE BLUE for bradycardia/asystole associated with GI bleed in the setting of anticoagulation, requiring massive transfusion protocol on 10/22.  EGD on 11/2 with clotted blood in stomach, adherent clot near PEG site that was clipped without active bleeding.  Most recent EGD 11/5 with ulcer noted near the PEG bumper site, gastritis, suction marks from the G-tube.  GJ tube was exchanged 11/9 by GI. Hgb 10.2 12/15  -Monitor hemoglobin intermittently.  -Continue PPI BID      Bowel/Bladder  Bowel, continent  Constipation  - Senna-docusate 1-2 tablets BID  - Miralax QDAY PRN  - Bisacodyl suppository 10mg QDAY PRN     Bladder, continent     1. Adjustment to disability:  Clinical psychology to eval and treat as indicated  2. FEN: Regular with thin liquids, cycled TF  3. Bowel: continent, meds as above  4. Bladder: continent  5. DVT Prophylaxis: warfarin  6. GI Prophylaxis: PPI  7. Code: full  8. Disposition: Local housing - [facility], [place]  9. ELOS:  2-3 weeks  10. Rehab prognosis:  good  9. Follow up Appointments on Discharge:                -Neurology 1-2 months after discharge               -Outpatient cardiac/pulmonary Rehab               -Transplant Coordinator               -ID: Follow up with Dr. Abebe on 12/21/2021 (likely need to reschedule)     Mo Austin MD  PGY-2  Physical Medicine & Rehabilitation

## 2021-12-18 NOTE — PLAN OF CARE
Discharge Planner Post-Acute Rehab OT:     Discharge Plan: to [facility] with fiance    Precautions: sternal, fall    Current Status:  ADLs:    Mobility: CGA for in room mobility using walker    Grooming: CGA standing at the sink, chair behind for intermittent rest breaks    Dressing: TBD UB dsg (unable d/t nsg request leave IV running on 12/15), Mod A LB dsg    Bathing: Min A w/shower chair    Toileting: CGA for transfer using walker  IADLs: fiance will assist  Vision/Cognition: L visual deficit at baseline and new right eye blind spot. Pt scored 24/30 on the SLUMS indicating mild deficit    Assessment: Pt report improved tolerance for toileting task feeling less out of breath than usual.   Pt had to warm up to session with Ub ex in bed and then dressing and ambulation to bathroom    Other Barriers to Discharge (DME, Family Training, etc): poor activity endurance

## 2021-12-18 NOTE — PLAN OF CARE
FOCUS/GOAL  Bowel management, Bladder management, and Pain management    ASSESSMENT, INTERVENTIONS AND CONTINUING PLAN FOR GOAL:      Pt is alert and oriented x4, using call light and able to make needs known. Assist of one with walker.  at 0200. Slept well during the overnight. Denies pain. Tube feeding infusing per order. Continent of bowel and bladder. Continue with plan of care.

## 2021-12-18 NOTE — PLAN OF CARE
FOCUS/GOAL  Bowel management, Bladder management, Pain management, Mobility, Skin integrity, and Safety management    ASSESSMENT, INTERVENTIONS AND CONTINUING PLAN FOR GOAL:  A/O, VS stable. Regular/thin, pills whole, supplemental tube feeding overnight, calorie counting. Ate most of meal. Continent of bowel and bladder. No complains of pain this shift. Assist of 1 with walker to transfer. No new skin concerns. Calling appropriately with light. Continue POC.

## 2021-12-18 NOTE — PLAN OF CARE
FOCUS/GOAL  Bowel management, Bladder management, and Pain management    ASSESSMENT, INTERVENTIONS AND CONTINUING PLAN FOR GOAL:      Pt is alert and oriented x4, using call light and able to make needs known. Mod I in the room with walker. Pt is wearing  CPAP. Slept well during the overnight. Denies pain. Continent of bowel and bladder. Continue with plan of care.

## 2021-12-18 NOTE — PROGRESS NOTES
Diabetes Consult Daily  Progress Note          Assessment/Plan:     HPI:  Mr. Thornton is a 57 yo M with PMHx of NSIP/ILD 2/2 Rheumatoid lung disease, s/p bilateral lung transplant, failure to wean off the ventilator, s/p tracheostomy and PEG tube insertion, rheumatoid arthritis, bronchiectasis, pulmonary HTN, SALTY, essential HTN, HLD, PLD, hypogammaglobinemia, duodenal anomaly, anxiety, and depression admitted to  [facility] on 12/13/2021 for ongoing rehabilitation s/p [facility] hospitalization from 09/05/2021 to 12/13/2021.     IDS to sign off from daily visit -  Please page when next nutrition change is due.  Prednisone taper is unlikely to call for insulin dose change, but monitoring recommended.     Assessment:     1)  steroid induced Diabetes Mellitus, controlled (A1c 6.6 prior to transplant), with TF and steroid induced hyperglycemia  2)  tube feeding induced hyperglycemia - plan for reduction today  3)  S/P BSLT 10/16/21        Plan:                  - Decrease NPH 24 units at start of TF cycle - Do not give if TF not started                  - Decrease AM Lantus to 11 units and further reduce to 9 unit(s) tomorrow (12/19)                 - Novolog high resistance sliding scale change to 5x, 0200, 0800, 1200, 1800, 2200 - during TF times                 - Decrease Novolog prandial to 1:20 g CHO with meals, snacks starting at 1200 today                 - BG monitoring QAC, HS                  - hypoglycemia protocol                 - diabetes education needs will be assessed closer to discharge, when TF plan solidified                 - on discharge, will recommend outpatient follow up with [facility] service or endocrinology closer to home in [place], [place]    **IDS to sign off from daily visits for now.  Please page when next nutrition change is due.  Prednisone taper is unlikely to call for insulin dose change, but monitoring recommended**.     Plan discussed with patient, bedside  "RN, and primary team via this note.           Interval History:     The last 24 hours progress and nursing notes reviewed.      BG trend:  Stable however on lower end this AM - insulins reduced       Woody updated on additional adjustments and he has no new questions or concerns    Text page communication to primary team, IDS signing off.     Recent Labs   Lab 12/18/21  0215 12/17/21  2129 12/17/21  1707 12/17/21  1200 12/17/21  0819 12/17/21  0200   * 96 123* 123* 107* 154*           Nutrition:     Orders Placed This Encounter      Regular Diet Adult Thin Liquids (level 0)          PTA Regimen:     Plan/Recommendations per RD today 12/17:  RD changed cycled J-TF to Nepro w/carbsteady at 60 ml/hr x12 hrs from 6 pm to 6 am with 100 ml water flush q 4 hrs to provide 1296 kcals, 58 gm protein, 110 gm CHO, 9 gm fiber, 1123 ml water/day - TF now reduced to ~63% lower end kcal needs, ~70% lower end protein needs. Re-ordered calorie count x3 more days, continue diet and supplement orders as ordered.           Review of Systems:   CC:  denies    Constitutional:   No fever/chills  ENT/Mouth:   No hearing changes, no ear pain, no sore throat, no rhinorrhea, no difficulty swallowing  Eyes:  No eye pain, no discharge, no vision changes  CV:   No CP/SOB, no new edema  Resp:  No cough, no wheezing  GI:   No N/V, denies constipation, denies diarrhea  :  No dysuria, frequency, or hematuria  Musk:  No new joint swelling/pain, no back pain  Skin/heme:   No new rashes/bruises/open areas.  No pruritis  Neuro:   No new weakness, denies numbness/tingling, no headache  Psych:   No new anxiety, denies mood changes or depression  Endocrine:  No new polyuria or polydipsia         Medications:   Steroid planning:  Prednisone 10 mg BID       Physical Exam:   BP (!) 149/84 (BP Location: Left arm, Patient Position: Supine)   Pulse 105   Temp (!) 95.7  F (35.4  C) (Tympanic)   Resp 20   Ht 1.646 m (5' 4.8\")   Wt 68.3 kg (150 lb 9.6 " oz)   SpO2 96%   BMI 25.22 kg/m      General:  pleasant, in no acute distress.   HEENT:  NC/AT. MMM, sclera not injected  Lungs:  unremarkable, no new cough, no SOB  ABD:  rounded, soft, non-tender  Skin:  warm and dry, no obvious lesions - healing area to chin - some dried blood noted in beard.  MSK:   moving all extremities  Mental Status:  Alert and oriented x3  Psych:   Cooperative, poor eye contact, withdrawn affect         Data:     Lab Results   Component Value Date    A1C 5.3 11/15/2021    A1C 6.6 09/07/2021    A1C 6.5 09/05/2021            CBC RESULTS: Recent Labs   Lab Test 12/17/21  0759   WBC 11.6*   RBC 3.48*   HGB 9.9*   HCT 31.2*   MCV 90   MCH 28.4   MCHC 31.7   RDW 14.0        Recent Labs   Lab Test 12/18/21  0808 12/18/21  0215 12/16/21  0831 12/16/21  0705 12/15/21  1704 12/15/21  1139 12/14/21  1142 12/14/21  1106   NA  --   --   --  137  --   --   --  135   POTASSIUM  --   --   --  4.8  --  4.4  --  5.0   CHLORIDE  --   --   --  106  --   --   --  101   CO2  --   --   --  27  --   --   --  25   ANIONGAP  --   --   --  4  --   --   --  9   GLC 95 128*   < > 90   < >  --    < > 115*   BUN  --   --   --  29  --   --   --  33*   CR  --   --   --  0.74  --   --   --  0.82   ERIK  --   --   --  9.5  --   --   --  9.9    < > = values in this interval not displayed.       Liver Function Studies -   Recent Labs   Lab Test 12/12/21  0519   PROTTOTAL 5.8*   ALBUMIN 2.5*   BILITOTAL 0.2   ALKPHOS 104   AST 17   ALT 26     Lab Results   Component Value Date    INR 2.77 12/17/2021    INR 2.64 12/16/2021    INR 2.41 12/15/2021    INR 2.25 12/14/2021    INR 2.57 12/13/2021    INR 2.52 12/12/2021    INR 2.24 12/11/2021    INR 2.18 12/10/2021    INR 2.18 12/09/2021    INR 2.52 12/08/2021    INR 2.51 12/07/2021    INR 2.35 12/06/2021       ILIR Contreras CNP   Inpatient Diabetes Management Service  Pager - [phone]  Friday - Monday 0800 - 1600 hrs    To contact Endocrine Diabetes service:   From  8AM-4PM: page inpatient diabetes provider that is following the patient that day (see filed or incomplete progress notes/consult notes).  If uncertain of provider assignment: page job code [phone].  For questions or updates from 4PM-8AM: page the diabetes job code for on call fellow: [phone]    Please notify inpatient diabetes service if changes are planned to steroids, nutrition, or if procedures are planned requiring prolonged NPO status.Diabetes Management Team job code: [phone]    I spent a total of 35 minutes on the date of the encounter doing chart review, history and exam, documentation and further activities per the note.  Over 50% of my time on the unit was spent counseling the patient and/or coordinating care regarding acute hyperglycemic management.  See note for details.

## 2021-12-18 NOTE — PLAN OF CARE
"FOCUS/GOAL  Medication management, Skin integrity, Carryover of rehab techniques, and Safety management    ASSESSMENT, INTERVENTIONS AND CONTINUING PLAN FOR GOAL:      VS: BP (!) 149/84 (BP Location: Left arm, Patient Position: Supine)   Pulse 105   Temp (!) 95.7  F (35.4  C) (Tympanic)   Resp 20   Ht 1.646 m (5' 4.8\")   Wt 68.3 kg (150 lb 9.6 oz)   SpO2 96%   BMI 25.22 kg/m       O2: Stable on room air. Shortness with breath with activity. Offered O2 inhalation but refused.    Output: Voids spontaneously in bathroom/urinal   Last BM: 12/17 per pt report   Activity: Assist x 1 with gait belt and walker   Skin: Old trach site, PEG, bruises    Pain: denies   CMS: Baseline numbness and tingling in bilateral hands and feet. Denies new n/t. Tremors in bilateral upper extremities   Dressing: Old trach site and PEG dressing changed. PICC dressing CDI   Diet: Regular. CHO count. Tube feeding Nepro with carbsteady- 60 ml/hr x 12 hrs.  ml q4hr   LDA: Left arm triple lumen PICC and PEG   Equipment: IV pump, kangaroo pump, personal belongings   Plan: A&Ox4. Uses call light appropriately and makes needs known.          "

## 2021-12-18 NOTE — PROGRESS NOTES
[facility]    Medicine Progress Note - Hospitalist Service       Date of Admission:  12/13/2021    Assessment & Plan             Mr. Thornton is a 57 yo M with PMHx of NSIP/ILD 2/2 Rheumatoid lung disease, s/p bilateral lung transplant, failure to wean off the ventilator, s/p tracheostomy and PEG tube insertion, rheumatoid arthritis, bronchiectasis, pulmonary HTN, SALTY, essential HTN, HLD, PLD, hypogammaglobinemia, duodenal anomaly, anxiety, and depression admitted to  [facility] on 12/13/2021 for ongoing rehabilitation s/p [facility] hospitalization from 09/05/2021 to 12/13/2021.     Today's changes: 12/18/2021  Doing well.   No new concern.   No changes.        # ILD and NSIP s/p BSLT on 10/16/2021  # Acute on chronic hypoxic respiratory failure s/p tracheostomy on 10/29/21 and decannulation on 12/8/2021  # Bilateral pleural effusions, improved.  - Transplant pulmonology following. IS dosing per Pulmonary team.    -Immunosuppression: s/p induction therapy with basiliximab 10/16 (and high dose IV steroid) and 10/20 Continue tacrolimus 2.5 mg qAM / 2 mg qPM (decreased 12/11).  Goal level 8-12.  [id number] mg BID (decreased 11/2 given GI bleed, AZA to be avoided given TPMT) , prednisone 10 mg BID, next taper due 1/2/22   Prophylaxis:   - Bactrim for PJP ppx (held 11/2-11/5 d/t BRENNAN)  - Nystatin for oral candidiasis ppx, 6 month course  Serology:     Donor Recipient Intervention   CMV status Negative Negative None, CMV monthly   EBV status Positive Positive None, EBV monthly    HSV status N/A Positive S/p acyclovir POD #1-30 (recent infection history pre-txp)      Positive cross match:   Positive DSA: Note that he received two doses of rituximab in June, which is likely contributing to cross match result.  DSA newly positive 11/29 with DQB5 (mfi 2522), decreased (mfi 1873) on 12/6. DSA on 12/12, decreased further to 1703.   - Repeat DSA (12/26) every two weeks    Others:   - Levalbuterol and  mucomyst TID and pulm toilet and chest PT BID  - PT/OT/SLP -@ PM&R team.   - Continue 40 mg of Lasix daily  - Change oxycodone 5 mg TID PRN  - Scheduled acetaminophen 975 tid  - CXR Mondays  - CMP, Mg++, CBC qMTh      # Left lung cavitary lesion   # Klebsiella pneumoniae and Pseudomonas fluorescens/putida HAP  aspiration vs pseudomonal vs Klebsiella pneumonia related abscess. Indeterminate Quant Gold, but negative tuberculin skin tests on mulitple occurences. S/p BAL on 11/22.Cocci antigen in urine negative, serum, BAL histoplasmosis negative, CRAG negative, respiratory viral panel (-), TB (-), candida growth from bal culture. Transplant ID consulted.     - IV Ceftazdime 2 grams Q8H (until 12/21)  - Follow up CT chest in 4 weeks on 12/21  - Follow up with Dr. Abebe on 12/21/2021 @6:30 PM in a virtual visit.       # Invasive candidiasis with Candida albicans  Positive candida BCx 10/20 and 10/22.  BDG fungitell positive (399) on 10/20. Sputum Cx + Candida albicans persistently.  TC 10/23 without evidence of endocarditis. Ophthalmology consult 10/24 with benign dilated fundoscopic exam.  Candida empyema improved after chest tube drainage. Repeat pleural fungal cultures and fungal/bacterial blood cultures negative.     - Continue Fluconazole 400 mg IV daily until 12.21.     # HSV: Chronic intermittent active infection pre-transplant with recent HSV infection: crusted lesions throughout left side of jaw, s/p 10 day treatment course of ACV through 10/9.  HSV PCR blood negative 10/17.  S/p ACV ppx as above (started POD #1 instead of POD #8 given HSV history and location).    # Hypogammaglobinemia:   IgG previously low at 364 (9/7).  Noted at 265 at time of transplant, s/p IVIG 10/21, repeat IgG (11/17) 198, s/p IVIG (with premedication) 11/18. Repeat IVIG on 12/15 of 365;   Per Pulm: IVIG indicated but [facility]U  unable to administer IVIG and patient is unable to leave to receive it in an infusion center.  - Repeat IgG ~  12/30  - Further per Transplant team.     # Encephalopathy, resolved  # Difficulty sleeping  Likely multi-factorial in the setting of stroke, prolonged critical illness.  - Ct Seroquel 25 mg TID prn and 50 mg at bedtime. Wean down as tolerated.   - Melatonin 10 mg at bedtime  - Trazodone 25 mg prn.      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 MRI brain on 10/23 with multifocal subacute infarct within both cerebral hemispheres and left cerebellum. Presumed embolic with afib hx, identified while inpatient. He is currently able to ambulate with relative ease.    - Continue warfarin per pharmacy protocol      # Right subclavian DVT   Diagnosed on 11/4 on ultrasound.    - Continue warfarin per pharmacy protocol     # DM2 with steroid induced hyperglycemia  Hemoglobin A1c 6.6 pre-operatively. Endocrine recently consulted for steroids induced hyperglycemia.     -Continue glargine 13 IU qAM  -Continue NPH 45 IU qHS  -Novolog High Sliding scale insulin   -Novolog carbh coverage 1/15 gm cho  -Blood glucose QID with meals      # Severe malnutrition in the context of acute on chronic illness  Patient currently has a PEG J-tube and is getting the Jorde of nutrition through cycle tube feeds.  - Multivitamin, folic acid, B6, B12 supplements   - TF per nutrition; continue oral diet as well     # Anxiety and depression   Psychiatry reconsulted and after discussion suggest increasing lexapro. Will discuss with patient regarding starting ritalin in the AM for motivation.  -Ct Lexapro 20 mg daily (increased dose)  -As needed hydroxyzine.     # Concern for chipped tooth  # Poor dental hygiene  Patient noted he feels like he may have chipped his tooth or has a loose tooth after eating guevara. It is not painful.   - Dental hygiene consult placed and chipped tooth stable_ rec oral hygiene cares.     # Rheumatoid arthritis- Dx 5/2021 with +  CCP antibody and RF. Previously treated with rituximab. Rheum consulted early in admission. On steroids as noted above.     # SALTY - Noted pre-transplant. Home CPAP 6-12 cm H2O.  - Evaluate need for BiPAP after trach site has healed     # HTN- Continue PTA amlodipine.   Stable.     # Hypothyroidism - TSH 3.17 on 11/19/21.   -Continue PTA levothyroxine 25 mcg daily.     # Recurrent GIB - hgb drop on 10/22 s/p 2 unit pRBC transfusion with EGD on 10/23 with NJ/OG tube trauma with scant oozing. Patient then developed progressive hypotension and ultimately CODE BLUE for GIB in setting of anticoagulation with heparin drip, s/p massive transfusion protocol. EGD on 11/2 with large amount of clotted blood in stomach and area of raised mucosa with small adherent clot near PEG tube site that was clipped, no active bleeding.  Maroon stools 11/3, repeat EGD with extensive old blood in stomach, no active bleeding, small nodular area with prior clips clipped again.  Most recent EGD 11/5 with ulcer noted at PEG tube bumper site, gastritis, and suction marks from G tube. TF noted in G tube drainage bag 11/7, AXR with GJ tube tip projecting over proximal duodenum. GJ tube exchanged 11/9 by GI.    - PO PPI BID    .   # Hypomagnesemia: Suppressed reabsorption 2/2 CNI.  Requiring intermittent IV and PO replacement.  - Mag-Ox 800 mg qAM / 400 mg qPM (increased 12/8)    # Tremors: ? IS/Tacrolimus related. Ct to monitor.         Diet: Snacks/Supplements Adult: Other; Ensure Clear or Gelatein PRN; With Meals  Room Service  Calorie Counts  Regular Diet Adult Thin Liquids (level 0)  Adult Formula Drip Feeding: Continuous Nepro with Carbsteady; Jejunostomy; Goal Rate: 60 ml/hr x12 hrs from 6 PM to 6 AM; mL/hr; From: 6:00 PM; 6:00 AM; Medication - Feeding Tube Flush Frequency: At least 15-30 mL water before and after medication...    DVT Prophylaxis: Warfarin  Watson Catheter: Not present  Central Lines: None  Code Status: Full Code       Disposition Plan defer to primary       The patient's care was discussed with the Patient and Primary team.    Sadiq Valdez MD  Hospitalist Service  [facility]  Securely message with the [employer] Web Console (learn more here)  Text page via [employer] Paging/Directory        ______________________________________________________________________    Interval History   Interval events reviewed.   Says overall feeling slightly better today  Still w/ MYERS+ however says stable to better today  Intermittent ongoing, stable cough.   Denies fever  No chest pain  Gen weakness.   No NV or pain abdomen.   No new sensory or motor complaint.   No new rash.    No other new or acute medical concern      Data reviewed today: I reviewed all medications, new labs and imaging results over the last 24 hours. I personally reviewed no images or EKG's today.    Physical Exam   Vital Signs: Temp: 97.9  F (36.6  C) Temp src: Oral BP: 135/80 Pulse: 85   Resp: 20 SpO2: 91 % O2 Device: None (Room air)    Weight: 150 lbs 9.6 oz      General Appearance:  Awake, interactive, NAD  Respiratory: Non labored breathing at rest. CTA bl  Cardiovascular: RRR s1s2  GI: Soft. NT. ND.  Extremities: Distally wwp. No pedal edema  Skin: No acute rash on exposed areas.   Neuro: moving all extremities. Tremors both UE+  Others: anxious.       Data   Recent Labs   Lab 12/18/21  0808 12/18/21  0635 12/18/21  0215 12/17/21  2129 12/17/21  0819 12/17/21  0759 12/16/21  1655 12/16/21  1611 12/16/21  0831 12/16/21  0707 12/16/21  0705 12/15/21  1704 12/15/21  1139 12/15/21  0739 12/15/21  0647 12/14/21  1142 12/14/21  1106 12/12/21  0933 12/12/21  0519   WBC  --   --   --   --   --  11.6*  --  15.8*  --   --   --   --   --   --  11.0  --  13.2*  --  9.7   HGB  --   --   --   --   --  9.9*  --  10.6*  --   --   --   --   --   --  10.2*  --  11.5*  --  9.8*   MCV  --   --   --   --   --  90  --  89  --   --   --   --   --   --   90  --  90  --  93   PLT  --   --   --   --   --  171  --  199  --   --   --   --   --   --  193  --  218  --  144*   INR  --  2.66*  --   --   --  2.77*  --   --   --  2.64*  --   --   --   --  2.41*  --  2.25*   < > 2.52*   NA  --   --   --   --   --   --   --   --   --   --  137  --   --   --   --   --  135  --  141   POTASSIUM  --   --   --   --   --   --   --   --   --   --  4.8  --  4.4  --   --   --  5.0  --  4.5   CHLORIDE  --   --   --   --   --   --   --   --   --   --  106  --   --   --   --   --  101  --  108   CO2  --   --   --   --   --   --   --   --   --   --  27  --   --   --   --   --  25  --  26   BUN  --   --   --   --   --   --   --   --   --   --  29  --   --   --   --   --  33*  --  34*   CR  --   --   --   --   --   --   --   --   --   --  0.74  --   --   --   --   --  0.82  --  1.00   ANIONGAP  --   --   --   --   --   --   --   --   --   --  4  --   --   --   --   --  9  --  7   ERIK  --   --   --   --   --   --   --   --   --   --  9.5  --   --   --   --   --  9.9  --  8.6   GLC 95  --  128* 96   < >  --    < >  --    < >  --  90   < >  --    < >  --    < > 115*   < > 167*   ALBUMIN  --   --   --   --   --   --   --   --   --   --   --   --   --   --   --   --   --   --  2.5*   PROTTOTAL  --   --   --   --   --   --   --   --   --   --   --   --   --   --   --   --   --   --  5.8*   BILITOTAL  --   --   --   --   --   --   --   --   --   --   --   --   --   --   --   --   --   --  0.2   ALKPHOS  --   --   --   --   --   --   --   --   --   --   --   --   --   --   --   --   --   --  104   ALT  --   --   --   --   --   --   --   --   --   --   --   --   --   --   --   --   --   --  26   AST  --   --   --   --   --   --   --   --   --   --   --   --   --   --   --   --   --   --  17    < > = values in this interval not displayed.     No results found for this or any previous visit (from the past 24 hour(s)).  Medications     dextrose       - MEDICATION INSTRUCTIONS -       Warfarin Therapy  Reminder         acetaminophen  975 mg Oral or Feeding Tube Q8H     acetylcysteine  2 mL Nebulization TID     amLODIPine  5 mg Oral Daily     calcium carbonate 600 mg-vitamin D 400 units  1 tablet Oral or Feeding Tube BID w/meals     cefTAZidime  2 g Intravenous Q8H     escitalopram  20 mg Oral or Feeding Tube Daily     fiber modular (NUTRISOURCE FIBER)  1 packet Per Feeding Tube BID     fluconazole  400 mg Intravenous Q24H     fluticasone  1 spray Both Nostrils Daily     folic acid-vit B6-vit B12  1 tablet Oral Daily     furosemide  40 mg Oral Daily     insulin aspart  1-12 Units Subcutaneous 5 times per day     insulin aspart   Subcutaneous TID w/meals     insulin glargine  11 Units Subcutaneous QAM     insulin NPH  27 Units Subcutaneous Q24H     levalbuterol  1.25 mg Nebulization TID     levothyroxine  25 mcg Oral Daily     Lidocaine  2 patch Transdermal Q24H     lidocaine   Transdermal Q8H     magnesium oxide  400 mg Oral QPM     magnesium oxide  800 mg Oral QAM     multivitamin w/minerals  1 tablet Oral Daily     mycophenolate  1,000 mg Oral BID     nystatin  1,000,000 Units Swish & Swallow 4x Daily     pantoprazole  40 mg Oral BID AC     predniSONE  10 mg Oral or Feeding Tube BID     QUEtiapine  50 mg Oral At Bedtime     rosuvastatin  10 mg Oral or Feeding Tube Daily     sodium chloride (PF)  10-40 mL Intracatheter Q8H     sulfamethoxazole-trimethoprim  1 tablet Oral or Feeding Tube Daily     tacrolimus  2 mg Oral QPM     tacrolimus  2.5 mg Oral QAM     warfarin ANTICOAGULANT  2 mg Oral ONCE at 18:00

## 2021-12-19 ENCOUNTER — APPOINTMENT (OUTPATIENT)
Dept: SPEECH THERAPY | Facility: CLINIC | Age: 56
End: 2021-12-19
Attending: PHYSICAL MEDICINE & REHABILITATION
Payer: COMMERCIAL

## 2021-12-19 ENCOUNTER — APPOINTMENT (OUTPATIENT)
Dept: PHYSICAL THERAPY | Facility: CLINIC | Age: 56
End: 2021-12-19
Attending: PHYSICAL MEDICINE & REHABILITATION
Payer: COMMERCIAL

## 2021-12-19 ENCOUNTER — APPOINTMENT (OUTPATIENT)
Dept: OCCUPATIONAL THERAPY | Facility: CLINIC | Age: 56
End: 2021-12-19
Attending: PHYSICAL MEDICINE & REHABILITATION
Payer: COMMERCIAL

## 2021-12-19 LAB
GLUCOSE BLDC GLUCOMTR-MCNC: 106 MG/DL (ref 70–99)
GLUCOSE BLDC GLUCOMTR-MCNC: 110 MG/DL (ref 70–99)
GLUCOSE BLDC GLUCOMTR-MCNC: 115 MG/DL (ref 70–99)
GLUCOSE BLDC GLUCOMTR-MCNC: 117 MG/DL (ref 70–99)
GLUCOSE BLDC GLUCOMTR-MCNC: 149 MG/DL (ref 70–99)
HOLD SPECIMEN: NORMAL
HOLD SPECIMEN: NORMAL
INR PPP: 2.54 (ref 0.85–1.15)

## 2021-12-19 PROCEDURE — 36415 COLL VENOUS BLD VENIPUNCTURE: CPT | Performed by: INTERNAL MEDICINE

## 2021-12-19 PROCEDURE — 999N000157 HC STATISTIC RCP TIME EA 10 MIN

## 2021-12-19 PROCEDURE — 94640 AIRWAY INHALATION TREATMENT: CPT | Mod: 76

## 2021-12-19 PROCEDURE — 250N000009 HC RX 250: Performed by: PHYSICAL MEDICINE & REHABILITATION

## 2021-12-19 PROCEDURE — 250N000009 HC RX 250: Performed by: INTERNAL MEDICINE

## 2021-12-19 PROCEDURE — 99232 SBSQ HOSP IP/OBS MODERATE 35: CPT | Performed by: INTERNAL MEDICINE

## 2021-12-19 PROCEDURE — 97750 PHYSICAL PERFORMANCE TEST: CPT | Mod: GP | Performed by: STUDENT IN AN ORGANIZED HEALTH CARE EDUCATION/TRAINING PROGRAM

## 2021-12-19 PROCEDURE — 97535 SELF CARE MNGMENT TRAINING: CPT | Mod: GO

## 2021-12-19 PROCEDURE — 250N000013 HC RX MED GY IP 250 OP 250 PS 637: Performed by: PHYSICAL MEDICINE & REHABILITATION

## 2021-12-19 PROCEDURE — 85610 PROTHROMBIN TIME: CPT | Performed by: INTERNAL MEDICINE

## 2021-12-19 PROCEDURE — 97129 THER IVNTJ 1ST 15 MIN: CPT | Mod: GN | Performed by: SPEECH-LANGUAGE PATHOLOGIST

## 2021-12-19 PROCEDURE — 97130 THER IVNTJ EA ADDL 15 MIN: CPT | Mod: GN | Performed by: SPEECH-LANGUAGE PATHOLOGIST

## 2021-12-19 PROCEDURE — 99231 SBSQ HOSP IP/OBS SF/LOW 25: CPT | Mod: 24 | Performed by: PHYSICAL MEDICINE & REHABILITATION

## 2021-12-19 PROCEDURE — 97129 THER IVNTJ 1ST 15 MIN: CPT | Mod: GN | Performed by: REHABILITATION PRACTITIONER

## 2021-12-19 PROCEDURE — 94640 AIRWAY INHALATION TREATMENT: CPT

## 2021-12-19 PROCEDURE — 97110 THERAPEUTIC EXERCISES: CPT | Mod: GO

## 2021-12-19 PROCEDURE — 250N000012 HC RX MED GY IP 250 OP 636 PS 637: Performed by: INTERNAL MEDICINE

## 2021-12-19 PROCEDURE — 250N000013 HC RX MED GY IP 250 OP 250 PS 637: Performed by: INTERNAL MEDICINE

## 2021-12-19 PROCEDURE — 128N000003 HC R&B REHAB

## 2021-12-19 PROCEDURE — 250N000011 HC RX IP 250 OP 636: Performed by: PHYSICAL MEDICINE & REHABILITATION

## 2021-12-19 PROCEDURE — 97530 THERAPEUTIC ACTIVITIES: CPT | Mod: GP | Performed by: STUDENT IN AN ORGANIZED HEALTH CARE EDUCATION/TRAINING PROGRAM

## 2021-12-19 RX ORDER — WARFARIN SODIUM 2 MG/1
2 TABLET ORAL
Status: COMPLETED | OUTPATIENT
Start: 2021-12-19 | End: 2021-12-19

## 2021-12-19 RX ORDER — ACETAMINOPHEN 325 MG/1
975 TABLET ORAL EVERY 8 HOURS PRN
Status: DISCONTINUED | OUTPATIENT
Start: 2021-12-19 | End: 2021-12-30 | Stop reason: HOSPADM

## 2021-12-19 RX ADMIN — LEVALBUTEROL HYDROCHLORIDE 1.25 MG: 1.25 SOLUTION RESPIRATORY (INHALATION) at 13:38

## 2021-12-19 RX ADMIN — Medication 1 TABLET: at 07:38

## 2021-12-19 RX ADMIN — ACETYLCYSTEINE 2 ML: 200 SOLUTION ORAL; RESPIRATORY (INHALATION) at 08:15

## 2021-12-19 RX ADMIN — FLUCONAZOLE, SODIUM CHLORIDE 400 MG: 2 INJECTION INTRAVENOUS at 10:19

## 2021-12-19 RX ADMIN — QUETIAPINE FUMARATE 50 MG: 50 TABLET ORAL at 21:07

## 2021-12-19 RX ADMIN — INSULIN ASPART 2 UNITS: 100 INJECTION, SOLUTION INTRAVENOUS; SUBCUTANEOUS at 12:29

## 2021-12-19 RX ADMIN — Medication 1 PACKET: at 09:16

## 2021-12-19 RX ADMIN — NYSTATIN 1000000 UNITS: 100000 SUSPENSION ORAL at 12:29

## 2021-12-19 RX ADMIN — CEFTAZIDIME 2 G: 2 INJECTION, POWDER, FOR SOLUTION INTRAVENOUS at 05:18

## 2021-12-19 RX ADMIN — Medication 25 MG: at 21:07

## 2021-12-19 RX ADMIN — Medication 400 MG: at 18:21

## 2021-12-19 RX ADMIN — PANTOPRAZOLE SODIUM 40 MG: 40 TABLET, DELAYED RELEASE ORAL at 07:38

## 2021-12-19 RX ADMIN — NYSTATIN 1000000 UNITS: 100000 SUSPENSION ORAL at 20:29

## 2021-12-19 RX ADMIN — PREDNISONE 10 MG: 10 TABLET ORAL at 20:29

## 2021-12-19 RX ADMIN — CEFTAZIDIME 2 G: 2 INJECTION, POWDER, FOR SOLUTION INTRAVENOUS at 12:29

## 2021-12-19 RX ADMIN — CALCIUM CARBONATE 600 MG (1,500 MG)-VITAMIN D3 400 UNIT TABLET 1 TABLET: at 07:38

## 2021-12-19 RX ADMIN — NYSTATIN 1000000 UNITS: 100000 SUSPENSION ORAL at 15:53

## 2021-12-19 RX ADMIN — INSULIN ASPART 2 UNITS: 100 INJECTION, SOLUTION INTRAVENOUS; SUBCUTANEOUS at 10:18

## 2021-12-19 RX ADMIN — INSULIN GLARGINE 9 UNITS: 100 INJECTION, SOLUTION SUBCUTANEOUS at 09:11

## 2021-12-19 RX ADMIN — NYSTATIN 1000000 UNITS: 100000 SUSPENSION ORAL at 07:38

## 2021-12-19 RX ADMIN — ROSUVASTATIN CALCIUM 10 MG: 10 TABLET, FILM COATED ORAL at 07:37

## 2021-12-19 RX ADMIN — MYCOPHENOLATE MOFETIL 1000 MG: 250 CAPSULE ORAL at 20:29

## 2021-12-19 RX ADMIN — TACROLIMUS 2 MG: 1 CAPSULE ORAL at 18:22

## 2021-12-19 RX ADMIN — WARFARIN SODIUM 2 MG: 2 TABLET ORAL at 18:21

## 2021-12-19 RX ADMIN — FLUTICASONE PROPIONATE 1 SPRAY: 50 SPRAY, METERED NASAL at 07:37

## 2021-12-19 RX ADMIN — ACETYLCYSTEINE 2 ML: 200 SOLUTION ORAL; RESPIRATORY (INHALATION) at 13:38

## 2021-12-19 RX ADMIN — SULFAMETHOXAZOLE AND TRIMETHOPRIM 1 TABLET: 400; 80 TABLET ORAL at 07:38

## 2021-12-19 RX ADMIN — AMLODIPINE BESYLATE 5 MG: 5 TABLET ORAL at 07:38

## 2021-12-19 RX ADMIN — TACROLIMUS 2.5 MG: 1 CAPSULE ORAL at 09:11

## 2021-12-19 RX ADMIN — Medication 1 PACKET: at 20:34

## 2021-12-19 RX ADMIN — FUROSEMIDE 40 MG: 40 TABLET ORAL at 07:38

## 2021-12-19 RX ADMIN — CEFTAZIDIME 2 G: 2 INJECTION, POWDER, FOR SOLUTION INTRAVENOUS at 20:26

## 2021-12-19 RX ADMIN — MULTIPLE VITAMINS W/ MINERALS TAB 1 TABLET: TAB at 07:38

## 2021-12-19 RX ADMIN — MYCOPHENOLATE MOFETIL 1000 MG: 250 CAPSULE ORAL at 09:11

## 2021-12-19 RX ADMIN — INSULIN ASPART 3 UNITS: 100 INJECTION, SOLUTION INTRAVENOUS; SUBCUTANEOUS at 20:26

## 2021-12-19 RX ADMIN — ACETYLCYSTEINE 2 ML: 200 SOLUTION ORAL; RESPIRATORY (INHALATION) at 20:47

## 2021-12-19 RX ADMIN — LEVOTHYROXINE SODIUM 25 MCG: 25 TABLET ORAL at 07:37

## 2021-12-19 RX ADMIN — Medication 800 MG: at 09:11

## 2021-12-19 RX ADMIN — ESCITALOPRAM OXALATE 20 MG: 20 TABLET ORAL at 07:37

## 2021-12-19 RX ADMIN — LEVALBUTEROL HYDROCHLORIDE 1.25 MG: 1.25 SOLUTION RESPIRATORY (INHALATION) at 20:47

## 2021-12-19 RX ADMIN — PREDNISONE 10 MG: 10 TABLET ORAL at 09:11

## 2021-12-19 RX ADMIN — LEVALBUTEROL HYDROCHLORIDE 1.25 MG: 1.25 SOLUTION RESPIRATORY (INHALATION) at 08:15

## 2021-12-19 RX ADMIN — PANTOPRAZOLE SODIUM 40 MG: 40 TABLET, DELAYED RELEASE ORAL at 15:53

## 2021-12-19 RX ADMIN — CALCIUM CARBONATE 600 MG (1,500 MG)-VITAMIN D3 400 UNIT TABLET 1 TABLET: at 18:21

## 2021-12-19 ASSESSMENT — ACTIVITIES OF DAILY LIVING (ADL)
ADLS_ACUITY_SCORE: 25
ADLS_ACUITY_SCORE: 26
ADLS_ACUITY_SCORE: 26
ADLS_ACUITY_SCORE: 25
ADLS_ACUITY_SCORE: 26
ADLS_ACUITY_SCORE: 25
ADLS_ACUITY_SCORE: 26
ADLS_ACUITY_SCORE: 26
ADLS_ACUITY_SCORE: 25
ADLS_ACUITY_SCORE: 26

## 2021-12-19 NOTE — PROGRESS NOTES
[facility]   Acute Rehabilitation Unit  Daily progress note    INTERVAL HISTORY  No acute events overnight. Continue to have multiple awakenings overnight, which is frustrating. He understands the BG check and abx need to be at a certain time but wonders if he is able to have his overnight cares as consolidated as possible. A little more fatigued today, which he thinks may be associated with working hard with therapies. Shortness of breath is roughly baseline. Denies palpitation, abdominal pain, further concerns. Discuss that he has monitoring labs and CXR tomorrow    MEDICATIONS  Scheduled meds    acetylcysteine  2 mL Nebulization TID     amLODIPine  5 mg Oral Daily     calcium carbonate 600 mg-vitamin D 400 units  1 tablet Oral or Feeding Tube BID w/meals     cefTAZidime  2 g Intravenous Q8H     escitalopram  20 mg Oral or Feeding Tube Daily     fiber modular (NUTRISOURCE FIBER)  1 packet Per Feeding Tube BID     fluconazole  400 mg Intravenous Q24H     fluticasone  1 spray Both Nostrils Daily     folic acid-vit B6-vit B12  1 tablet Oral Daily     furosemide  40 mg Oral Daily     insulin aspart   Subcutaneous TID w/meals     insulin aspart  1-12 Units Subcutaneous 5 times per day     insulin glargine  9 Units Subcutaneous QAM     insulin NPH  24 Units Subcutaneous Q24H     levalbuterol  1.25 mg Nebulization TID     levothyroxine  25 mcg Oral Daily     Lidocaine  2 patch Transdermal Q24H     lidocaine   Transdermal Q8H     magnesium oxide  400 mg Oral QPM     magnesium oxide  800 mg Oral QAM     multivitamin w/minerals  1 tablet Oral Daily     mycophenolate  1,000 mg Oral BID     nystatin  1,000,000 Units Swish & Swallow 4x Daily     pantoprazole  40 mg Oral BID AC     predniSONE  10 mg Oral or Feeding Tube BID     QUEtiapine  50 mg Oral At Bedtime     rosuvastatin  10 mg Oral or Feeding Tube Daily     sodium chloride (PF)  10-40 mL Intracatheter Q8H      "sulfamethoxazole-trimethoprim  1 tablet Oral or Feeding Tube Daily     tacrolimus  2 mg Oral QPM     tacrolimus  2.5 mg Oral QAM     warfarin ANTICOAGULANT  2 mg Oral ONCE at 18:00       PRN meds:  acetaminophen, albuterol, bisacodyl, dextrose, glucose **OR** dextrose **OR** glucagon, hydrOXYzine, insulin aspart, lidocaine (viscous), magnesium hydroxide, melatonin, naloxone **OR** naloxone **OR** naloxone **OR** naloxone, ondansetron, oxyCODONE, - MEDICATION INSTRUCTIONS -, prochlorperazine, QUEtiapine, senna-docusate, sodium chloride (PF), traZODone, Warfarin Therapy Reminder, zinc-white petrolatum      PHYSICAL EXAM  /74 (BP Location: Right arm)   Pulse 99   Temp 98.2  F (36.8  C) (Oral)   Resp 18   Ht 1.646 m (5' 4.8\")   Wt 68.3 kg (150 lb 9.6 oz)   SpO2 93%   BMI 25.22 kg/m    Gen: Awake, coooperative  HEENT: atraumatic, no discharge from nares or ears, EOM intact  Cardio: Borderline tachycardic, no murmur auscultated  Pulm: Non-labored breathing, CTAB with diminished sounds at R lung base  Abd: Soft, non-tender to palpation, bowel sounds present. PEG c/d/i  Ext: No edema in lower or upper extremities b/l, no calf tenderness  Neuro/MSK: alert, oriented, no new slurring of words, answers questions appropriately, follows commands. Increased tremor from prior      LABS  Recent Labs   Lab 12/19/21  0743 12/19/21  0622 12/19/21  0215 12/18/21  2153 12/18/21  0808 12/18/21  0635 12/17/21  0819 12/17/21  0759 12/16/21  1655 12/16/21  1611 12/16/21  0707 12/16/21  0705 12/15/21  1704 12/15/21  1139 12/15/21  0739 12/15/21  0647 12/14/21  1142 12/14/21  1106   WBC  --   --   --   --   --   --   --  11.6*  --  15.8*  --   --   --   --   --  11.0  --  13.2*   HGB  --   --   --   --   --   --   --  9.9*  --  10.6*  --   --   --   --   --  10.2*  --  11.5*   MCV  --   --   --   --   --   --   --  90  --  89  --   --   --   --   --  90  --  90   PLT  --   --   --   --   --   --   --  171  --  199  --   --   --   " --   --  193  --  218   INR  --  2.54*  --   --   --  2.66*  --  2.77*  --   --    < >  --   --   --   --  2.41*  --  2.25*   NA  --   --   --   --   --   --   --   --   --   --   --  137  --   --   --   --   --  135   POTASSIUM  --   --   --   --   --   --   --   --   --   --   --  4.8  --  4.4  --   --   --  5.0   CHLORIDE  --   --   --   --   --   --   --   --   --   --   --  106  --   --   --   --   --  101   CO2  --   --   --   --   --   --   --   --   --   --   --  27  --   --   --   --   --  25   BUN  --   --   --   --   --   --   --   --   --   --   --  29  --   --   --   --   --  33*   CR  --   --   --   --   --   --   --   --   --   --   --  0.74  --   --   --   --   --  0.82   ANIONGAP  --   --   --   --   --   --   --   --   --   --   --  4  --   --   --   --   --  9   ERIK  --   --   --   --   --   --   --   --   --   --   --  9.5  --   --   --   --   --  9.9   *  --  149* 127*   < >  --    < >  --    < >  --    < > 90   < >  --    < >  --    < > 115*    < > = values in this interval not displayed.     No results found for this or any previous visit (from the past 24 hour(s)).    ASSESSMENT AND PLAN    Edson Thornton is a 56 year old right hand dominant male with a hx of NSIP/ILD associated with rheumatoid lung disease, hypertension, hyperlipidemia, RA who initially presented 9/5/2021 with acute on chronic respiratory failure for transplant work-up s/p bilateral lung transplant (10/16/21) with prolonged hospitalization associated with multifocal bilateral acute/subacute ischemic infarcts of bilateral frontal, occipital lobes.  Postoperative course complicated by prolonged ventilator wean s/p trach and PEG placement (10/29), encephalopathy, new Afib with RVR, BRENNAN, candidemia/Candida empyema, and brief bradycardic arrest with subsequent hypotension in the setting of GI bleed.  [facility] and was decannulated 12/8/2021.  He is currently stable on room air  at admission to acute rehab.  He presents with right upper extremity weakness, bilateral dysmetria/tremors, acute on chronic severe deconditioning, dyspnea on exertion, and impairment of ADLs and gait.     Admission to acute inpatient rehab 12/13/2021  .    Impairment group code: Stroke Ischemic 01.3 Bilateral Involvement: embolic CVA affecting B cerebral hemispheres and L cerebellum, as well as s/p bilateral lung transplantation     1. PT, OT 90 minutes of each on a daily basis, in addition to rehab nursing and close management of physiatrist.   2. Impairment of ADLs/IADLs:  OT for 60 min daily to work on upper and lower body self care, dressing, toileting, bathing, energy conservation techniques with use of ADs as needed.   3. Impairment of mobility:   PT for 60 min daily to work on gait exercises, strengthening, endurance buildup, transfers with use of walker as needed.   4. Impairment of cognition:  SLP for 60 minutes for cognitive evaluation and treatment strategies for higher level cognitive deficits, and memory impairment.  5. Rehab RN to administer medication, patient education on medication taking, VS monitoring, bowel regimen, glucose monitoring and wound care/surgical wound dressing changes and monitoring.   6. Medical Conditions - Hospital ist team consulted, appreciate assistance     Neuro/Psych  #Multifocal acute to subacute ischemic stroke, etiology likely embolic vs perioperative  Initially noted 10/22 and 11/6 with stroke code called for change in exam.  MRI brain 10/23 with infarcts noted in both cerebral hemispheres (R frontal, L corona radiata, bilateral occipital), left cerebellum, presumed associated with new Afib vs perioperative. Bilateral upper extremity paresis (R>L), suspicion for some cortical blindness  - Stroke risk factor management:              -Warfarin anticoagulation for Afib, pharmacy to dose              -BP goals: SBP<140              - Rosuvastatin 10 mg qday              -Not  smoking              -Hgb A1c: 6.6     #Pain  -Acetaminophen 975mg q8h  -Lidocaine patch  -Menthol patch     #Encephalopathy  #Sleep  -Delirium precautions  -Seroquel 50 mg at bedtime, 25mg TID prn; scheduled 25mg discontinued 12/14, continue to wean as able  -Melatonin 10mg qhs  -Trazodone 25mg prn     #Mood  #History of anxiety and depression  - PTA Lexapro increased to 10 mg while in acute hospital, increased to 20mg 12/14  -Hydroxyzine as needed         Pulmonary  #ILD, NISP, s/p bilateral lung transplant (10/16/2021)  #Acute on chronic respiratory failure s/p tracheostomy (10/29/21) and decannulation (12/8/21)  #Bilateral Pleural Effusions  -Sternal precautions until 12 wks post op (1/8/2022)  -Immunosuppression:              -s/p basiliximab on 10/16, 10/20              -Continue tacrolimus 2.5mg qam and 2mg qhs, goal 8-12              -Continue MMF 1000mg BID              -Continue prednisone 10mg BID  -Monitoring lab/imaging              -DSA q2wks              -Monthly Coccidioides Ag due 1/17              -CMV, EBV per transplant team              -CXR, CBC, BMP, Tac level M/Th  -Continue Levalbuterol and mucomyst nebs QID  -Aggressive pulmonary toilet and chest PT QID  -Continue Lasix 40mg qday     #SALTY  -PTA CPAP     ID  #Immunosuppression Prophylaxsis   -Bactrim x6 months  -Nystatin x6 months     #L lung cavitary lesion  #Hospital-acquired pneumonia with Klebsiella pneumoniae, Pseudomonas fluorescens/putida  Suspected aspiration versus pseudomonal vs Klebsiella pneumonia related to abscess.  CT on 1025 with left lower lobe nodular opacity with repeat on 11/22 showing new cavitary lesion in left lung base, multifocal bilateral upper lobe groundglass opacity, and new 1.8 cm fluid collection with surrounding fat stranding in left axilla, decreased bilateral loculated pleural effusions.  QuantiFERON gold indeterminate, negative tuberculin skin test on multiple occurrences.  Underwent BAL on 11/22 with  cultures growing Klebsiella and Pseudomonas fluorescens/putida, resistant to meropenem.  ID was consulted and feels this is likely fungal.  B-D glucan positive with known candidemia.  Cocci antigen in urine negative, serum histoplasmosis negative, CRAG negative  -IV ceftazidime 2g q8hrs (end 12/21)  -Follow-up chest CT 12/21 and will discuss with ID/Dr. Abebe after     #Invasive Candida albicans candidiasis  Positive Candida blood cultures 10/20, 10/22.  Fungitell positive (399).  TC 10/23 without evidence of endocarditis. Ophthalmic consulted 10/24, benign funduscopic exam.  Candida empyema 10/25.  Repeated pleural effusion fungal cultures and fungal/bacterial blood cultures have been without growth since 11/18.  Initially on micafungin (10/22-10/27) before transition to fluconazole IV  -Continue IV Fluconazole 400mg daily (10/26-12/21)     #hx of HSV  Chronic intermittent infection pretransplant, with recent HSV infection while in acute hospital (crusted lesions along left jaw) s/p 10d treatment until 10/9.     Cardiovascular  #Afib  Initially noted 10/18, converted to NSR with amio ggt. Metoprolol and amiodarone discontinued due to bradycardia. INR 2.66 12/18  -Warfarin as dosed by pharmacy     #Hypertension  -PTA amlodipine 5 mg daily     Heme  #Leukocytosis  New leukocytosis to 13.2 on 12/14--> 11.0 12/15 -->15.8 12/16 --> 11.6 12/17. Given prednisone, likely sits upper limit of normal. Patient with worsening dyspnea on exertion, SOB on 12/16 but CXR equivocal and remains afebrile. Already on abx/antifungals. May be stress response to when patient becomes more anxious/agitated. Continue to monitor CBCs and for signs/symptoms of infection     #R subclavian DVT  -Warfarin anticoagulation, pharmacy to dose     #Hypogammaglobulinemia  S/p IVIG  -Repeat IgG 12/15 365  -Repeat ~12/30     Endocrine  #Type 2 diabetes  #Steroid-induced hyperglycemia  -Endocrine consulted while in acute hospital. Reconsulted 12/14 with  anticipation of transitioning TF, appreciate recs. Discussed TF decrease, recs adjusted 12/19. Page if making TF changes                  -Decrease NPH 24 units at start of TF cycle - Do not give if TF not started                  - Decrease AM Lantus to 11 units and further reduce to 9 unit(s) tomorrow (12/19)                 - Novolog high resistance sliding scale change to 5x, 0200, 0800, 1200, 1800, 2200 - during TF times                 - Decrease Novolog prandial to 1:20 g CHO with meals, snacks starting at 1200 today                 - BG monitoring QAC, HS                  - hypoglycemia protocol                 - diabetes education needs will be assessed closer to discharge, when TF plan solidified                 - on discharge, will recommend outpatient follow up with [facility] service or endocrinology closer to home in [place], [place]     #Hypothyroidism  TSH 3.17 on 11/19  -Continue levothyroxine 25 mcg daily     Renal  #BRENNAN, resolved  Cr 0.82 12/14  -Monitor intermittently     MSK/Rheum  #Rheumatoid arthritis  Previously treated with rituximab.  Rheumatology familiar and consulted early in admission.  -Steroids as above     GI/FEN  #Severe malnutrition  Patient currently has PEG in place, receiving majority of nutrition through cycled tube feeds. Has had borderline K (4.8-5.0) and transitioned to renal formulation.   -Continue multivitamin, folate, B6, B12 supplements  -TF per dietician, appreciate assistance.  Currently cycled, adjusting formula/amount 12/17  -Encourage p.o. intake     #hx of GI Bleed  Patient with progressive hypotension and CODE BLUE for bradycardia/asystole associated with GI bleed in the setting of anticoagulation, requiring massive transfusion protocol on 10/22.  EGD on 11/2 with clotted blood in stomach, adherent clot near PEG site that was clipped without active bleeding.  Most recent EGD 11/5 with ulcer noted near the PEG bumper site, gastritis, suction marks from  the G-tube.  GJ tube was exchanged 11/9 by GI. Hgb 10.2 12/15  -Monitor hemoglobin intermittently.  -Continue PPI BID      Bowel/Bladder  Bowel, continent  Constipation  - Senna-docusate 1-2 tablets BID  - Miralax QDAY PRN  - Bisacodyl suppository 10mg QDAY PRN     Bladder, continent     1. Adjustment to disability:  Clinical psychology to eval and treat as indicated  2. FEN: Regular with thin liquids, cycled TF  3. Bowel: continent, meds as above  4. Bladder: continent  5. DVT Prophylaxis: warfarin  6. GI Prophylaxis: PPI  7. Code: full  8. Disposition: Local [facility] - [facility]  9. ELOS:  2-3 weeks  10. Rehab prognosis:  good  9. Follow up Appointments on Discharge:                -Neurology 1-2 months after discharge               -Outpatient cardiac/pulmonary Rehab               -Transplant Coordinator               -ID: Follow up with Dr. Abebe on 12/21/2021 (likely need to reschedule)     Mo Austin MD  PGY-2  Physical Medicine & Rehabilitation

## 2021-12-19 NOTE — PLAN OF CARE
Discharge Planner Post-Acute Rehab OT:     Discharge Plan: to [facility] with fiance    Precautions: sternal, fall    Current Status:  ADLs:    Mobility: CGA for in room mobility using walker    Grooming: CGA standing at the sink, chair behind for intermittent rest breaks    Dressing: TBD UB dsg (unable d/t nsg request leave IV running on 12/15), Mod A LB dsg    Bathing: Min A w/shower chair    Toileting: CGA for transfer using walker  IADLs: fiance will assist  Vision/Cognition: L visual deficit at baseline and new right eye blind spot. Pt scored 24/30 on the SLUMS indicating mild deficit    Assessment: Pt ambulate bed<>BR with decreased SOB per pt report.   Stand at sink 1 X 1.25 seconds.  Ub ex sitting up in bed 1 minutes X 24    Other Barriers to Discharge (DME, Family Training, etc): poor activity endurance

## 2021-12-19 NOTE — PLAN OF CARE
"Discharge Planner Post-Acute Rehab PT:     Discharge Plan: [facility] with [employer], then home [place], [place]    Precautions: Falls, dyspnea on exertion    Current Status:  Bed Mobility: Min A  Transfer: CGA with WW  Gait: CGA x 15 ft with WW  Stairs: 5 step ups with min A  Balance: Multiple LOB during session, recommending CGA at all times  30 seconds STS from 20\" mat using hands on FWW 12/19/21 = 6, OMNI 3/10  TUG 12/19/21, CGA with FWW = 20 seconds, OMNI 7/10    Assessment:  Pt continues to be impulsive during transfers, sitting before properly positioned. Completed TUG and 30 seconds STS. INcreased RR with all activity, O2 sats in mid 90s, HR up to 120.    Other Barriers to Discharge (DME, Family Training, etc): Significant deconditioning  "

## 2021-12-19 NOTE — PLAN OF CARE
FOCUS/GOAL  Medication management, Skin integrity, Carryover of rehab techniques, and Safety management    ASSESSMENT, INTERVENTIONS AND CONTINUING PLAN FOR GOAL:  Pt was A/O, able to use call light and make needs known. VSS. Denies pain, SOB, chest pain nor dizziness. Assist of 1 walker with transfers. On cons carb, regular diet, thin liquids, takes medicines whole. Cycle TF at 60ml/hr with 100ml water flushes q 4hrs to run x 12 hrs. Cont of BL, no BM this shift, LBM-12/18/21. Old trach and PEG tube dressings, CDI. PICC line in place, blood return noted on all 3 lumens, saline locked. BG monitored. PRN trazodone given per request. Will continue with current POC

## 2021-12-19 NOTE — PROGRESS NOTES
[facility]    Medicine Progress Note - Hospitalist Service       Date of Admission:  12/13/2021    Assessment & Plan             Mr. Thornton is a 57 yo M with PMHx of NSIP/ILD 2/2 Rheumatoid lung disease, s/p bilateral lung transplant, failure to wean off the ventilator, s/p tracheostomy and PEG tube insertion, rheumatoid arthritis, bronchiectasis, pulmonary HTN, SALTY, essential HTN, HLD, PLD, hypogammaglobinemia, duodenal anomaly, anxiety, and depression admitted to  [facility] on 12/13/2021 for ongoing rehabilitation s/p [facility] hospitalization from 09/05/2021 to 12/13/2021.     Today's changes: 12/19/2021    Doing well.   No new concern/changes.           # ILD and NSIP s/p BSLT on 10/16/2021  # Acute on chronic hypoxic respiratory failure s/p tracheostomy on 10/29/21 and decannulation on 12/8/2021  # Bilateral pleural effusions, improved.  - Transplant pulmonology following. IS dosing per Pulmonary team.    -Immunosuppression: s/p induction therapy with basiliximab 10/16 (and high dose IV steroid) and 10/20 Continue tacrolimus 2.5 mg qAM / 2 mg qPM (decreased 12/11).  Goal level 8-12. 12/16: 8.6.  [id number] mg BID (decreased 11/2 given GI bleed, AZA to be avoided given TPMT) , prednisone 10 mg BID, next taper due 1/2/22   Prophylaxis:   - Bactrim for PJP ppx (held 11/2-11/5 d/t BRENNAN)  - Nystatin for oral candidiasis ppx, 6 month course  Serology:     Donor Recipient Intervention   CMV status Negative Negative None, CMV monthly   EBV status Positive Positive None, EBV monthly    HSV status N/A Positive S/p acyclovir POD #1-30 (recent infection history pre-txp)      Positive cross match:   Positive DSA: Note that he received two doses of rituximab in June, which is likely contributing to cross match result.  DSA newly positive 11/29 with DQB5 (mfi 2522), decreased (mfi 1873) on 12/6. DSA on 12/12, decreased further to 1703.   - Repeat DSA (12/26) every two weeks    Others:   -  Levalbuterol and mucomyst TID and pulm toilet and chest PT BID  - PT/OT/SLP -@ PM&R team.   - Continue 40 mg of Lasix daily  -  Oxycodone 5 mg TID PRN, wean down as tolerated.   - Scheduled acetaminophen 975 tid  - CXR Mondays  - CMP, Mg++, CBC qMTh      # Left lung cavitary lesion   # Klebsiella pneumoniae and Pseudomonas fluorescens/putida HAP  aspiration vs pseudomonal vs Klebsiella pneumonia related abscess. Indeterminate Quant Gold, but negative tuberculin skin tests on mulitple occurences. S/p BAL on 11/22.Cocci antigen in urine negative, serum, BAL histoplasmosis negative, CRAG negative, respiratory viral panel (-), TB (-), candida growth from bal culture. Transplant ID consulted.     - IV Ceftazdime 2 grams Q8H (until 12/21)  - Follow up CT chest in 4 weeks on 12/21  - Follow up with Dr. Abebe on 12/21/2021 @6:30 PM in a virtual visit.       # Invasive candidiasis with Candida albicans  Positive candida BCx 10/20 and 10/22.  BDG fungitell positive (399) on 10/20. Sputum Cx + Candida albicans persistently.  TC 10/23 without evidence of endocarditis. Ophthalmology consult 10/24 with benign dilated fundoscopic exam.  Candida empyema improved after chest tube drainage. Repeat pleural fungal cultures and fungal/bacterial blood cultures negative.     - Continue Fluconazole 400 mg IV daily until 12.21.     # HSV: Chronic intermittent active infection pre-transplant with recent HSV infection: crusted lesions throughout left side of jaw, s/p 10 day treatment course of ACV through 10/9.  HSV PCR blood negative 10/17.  S/p ACV ppx as above (started POD #1 instead of POD #8 given HSV history and location).    # Hypogammaglobinemia:   IgG previously low at 364 (9/7).  Noted at 265 at time of transplant, s/p IVIG 10/21, repeat IgG (11/17) 198, s/p IVIG (with premedication) 11/18. Repeat IVIG on 12/15 of 365;   Per Pulm: IVIG indicated but [facility]  unable to administer IVIG and patient is unable to leave to receive it in an  infusion center.  - Repeat IgG ~ 12/30  - Further per Transplant team.     # Encephalopathy, resolved  # Difficulty sleeping  Likely multi-factorial in the setting of stroke, prolonged critical illness.  - Ct Seroquel 25 mg TID prn and 50 mg at bedtime. Wean down as tolerated.   - Melatonin 10 mg at bedtime  - Trazodone 25 mg prn.      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 MRI brain on 10/23 with multifocal subacute infarct within both cerebral hemispheres and left cerebellum. Presumed embolic with afib hx, identified while inpatient. He is currently able to ambulate with relative ease.    - Continue warfarin per pharmacy protocol      # Right subclavian DVT   Diagnosed on 11/4 on ultrasound.  - Continue warfarin per pharmacy protocol  - INR therapeutic.      # DM2 with steroid induced hyperglycemia  Hemoglobin A1c 6.6 pre-operatively. Endocrine recently consulted for steroids induced hyperglycemia.     -Continue glargine 13 IU qAM  -Continue NPH 45 IU qHS  -Novolog High Sliding scale insulin   -Novolog carbh coverage 1/15 gm cho  -Blood glucose QID with meals     Recent Labs   Lab 12/19/21  0743 12/19/21  0215 12/18/21  2153 12/18/21  1635 12/18/21  1127 12/18/21  0808   * 149* 127* 117* 98 95        # Severe malnutrition in the context of acute on chronic illness  Patient currently has a PEG J-tube and is getting the Jorde of nutrition through cycle tube feeds.  - Multivitamin, folic acid, B6, B12 supplements   - TF per nutrition; continue oral diet as well     # Anxiety and depression   Psychiatry reconsulted and after discussion suggest increasing lexapro. Will discuss with patient regarding starting ritalin in the AM for motivation.  -Ct Lexapro 20 mg daily (increased dose)  -As needed hydroxyzine.     # Concern for chipped tooth  # Poor dental hygiene  Patient noted he feels like he may have chipped his tooth  or has a loose tooth after eating guevara. It is not painful.   - Dental hygiene consult placed and chipped tooth stable_ rec oral hygiene cares.     # Rheumatoid arthritis- Dx 5/2021 with + CCP antibody and RF. Previously treated with rituximab. Rheum consulted early in admission. On steroids as noted above.     # SALTY - Noted pre-transplant. Home CPAP 6-12 cm H2O.  - Evaluate need for BiPAP after trach site has healed     # HTN- Continue PTA amlodipine.   Stable.     # Hypothyroidism - TSH 3.17 on 11/19/21.   -Continue PTA levothyroxine 25 mcg daily.     # Recurrent GIB - hgb drop on 10/22 s/p 2 unit pRBC transfusion with EGD on 10/23 with NJ/OG tube trauma with scant oozing. Patient then developed progressive hypotension and ultimately CODE BLUE for GIB in setting of anticoagulation with heparin drip, s/p massive transfusion protocol. EGD on 11/2 with large amount of clotted blood in stomach and area of raised mucosa with small adherent clot near PEG tube site that was clipped, no active bleeding.  Maroon stools 11/3, repeat EGD with extensive old blood in stomach, no active bleeding, small nodular area with prior clips clipped again.  Most recent EGD 11/5 with ulcer noted at PEG tube bumper site, gastritis, and suction marks from G tube. TF noted in G tube drainage bag 11/7, AXR with GJ tube tip projecting over proximal duodenum. GJ tube exchanged 11/9 by GI.    - PO PPI BID    .   # Hypomagnesemia: Suppressed reabsorption 2/2 CNI.  Requiring intermittent IV and PO replacement.  - Mag-Ox 800 mg qAM / 400 mg qPM (increased 12/8)    # Tremors: ? IS/Tacrolimus related. Ct to monitor.   Improving.      Diet: Snacks/Supplements Adult: Other; Ensure Clear or Gelatein PRN; With Meals  Room Service  Calorie Counts  Regular Diet Adult Thin Liquids (level 0)  Adult Formula Drip Feeding: Continuous Nepro with Carbsteady; Jejunostomy; Goal Rate: 60 ml/hr x12 hrs from 6 PM to 6 AM; mL/hr; From: 6:00 PM; 6:00 AM; Medication -  Feeding Tube Flush Frequency: At least 15-30 mL water before and after medication...    DVT Prophylaxis: Warfarin  Watson Catheter: Not present  Central Lines: None  Code Status: Full Code      Disposition Plan defer to primary       The patient's care was discussed with the Patient and Primary team.    Sadiq Valdez MD  Hospitalist Service  [facility]  Securely message with the Vocera Web Console (learn more here)  Text page via [employer] Paging/Directory        ______________________________________________________________________    Interval History   Interval events reviewed.   Says overall feeling better  Intermittent ongoing, stable cough. MYERS+  Denies fever  No chest pain  Gen weakness.   No NV or pain abdomen.   UE tremors: improving. Worse at night.   No new sensory or motor complaint.   No new rash.    No other new or acute medical concern      Data reviewed today: I reviewed all medications, new labs and imaging results over the last 24 hours. I personally reviewed no images or EKG's today.    Physical Exam   Vital Signs: Temp: 98.2  F (36.8  C) Temp src: Oral BP: 124/74 Pulse: 99   Resp: 18 SpO2: 93 % O2 Device: None (Room air)    Weight: 150 lbs 9.6 oz      General Appearance:  Awake, interactive, NAD  Respiratory: Non labored breathing at rest. CTA bl   Cardiovascular: RRR s1s2  GI: Soft. NT. ND.  Extremities: Distally wwp. No pedal edema  Skin: No acute rash on exposed areas.   Neuro: moving all extremities. Tremors both UE+  Others: anxious.       Data   Recent Labs   Lab 12/19/21  0743 12/19/21  0622 12/19/21  0215 12/18/21  2153 12/18/21  0808 12/18/21  0635 12/17/21  0819 12/17/21  0759 12/16/21  1655 12/16/21  1611 12/16/21  0707 12/16/21  0705 12/15/21  1704 12/15/21  1139 12/15/21  0739 12/15/21  0647 12/14/21  1142 12/14/21  1106   WBC  --   --   --   --   --   --   --  11.6*  --  15.8*  --   --   --   --   --  11.0  --  13.2*   HGB  --   --   --   --    --   --   --  9.9*  --  10.6*  --   --   --   --   --  10.2*  --  11.5*   MCV  --   --   --   --   --   --   --  90  --  89  --   --   --   --   --  90  --  90   PLT  --   --   --   --   --   --   --  171  --  199  --   --   --   --   --  193  --  218   INR  --  2.54*  --   --   --  2.66*  --  2.77*  --   --    < >  --   --   --   --  2.41*  --  2.25*   NA  --   --   --   --   --   --   --   --   --   --   --  137  --   --   --   --   --  135   POTASSIUM  --   --   --   --   --   --   --   --   --   --   --  4.8  --  4.4  --   --   --  5.0   CHLORIDE  --   --   --   --   --   --   --   --   --   --   --  106  --   --   --   --   --  101   CO2  --   --   --   --   --   --   --   --   --   --   --  27  --   --   --   --   --  25   BUN  --   --   --   --   --   --   --   --   --   --   --  29  --   --   --   --   --  33*   CR  --   --   --   --   --   --   --   --   --   --   --  0.74  --   --   --   --   --  0.82   ANIONGAP  --   --   --   --   --   --   --   --   --   --   --  4  --   --   --   --   --  9   ERIK  --   --   --   --   --   --   --   --   --   --   --  9.5  --   --   --   --   --  9.9   *  --  149* 127*   < >  --    < >  --    < >  --    < > 90   < >  --    < >  --    < > 115*    < > = values in this interval not displayed.     No results found for this or any previous visit (from the past 24 hour(s)).  Medications     dextrose       - MEDICATION INSTRUCTIONS -       Warfarin Therapy Reminder         acetylcysteine  2 mL Nebulization TID     amLODIPine  5 mg Oral Daily     calcium carbonate 600 mg-vitamin D 400 units  1 tablet Oral or Feeding Tube BID w/meals     cefTAZidime  2 g Intravenous Q8H     escitalopram  20 mg Oral or Feeding Tube Daily     fiber modular (NUTRISOURCE FIBER)  1 packet Per Feeding Tube BID     fluconazole  400 mg Intravenous Q24H     fluticasone  1 spray Both Nostrils Daily     folic acid-vit B6-vit B12  1 tablet Oral Daily     furosemide  40 mg Oral Daily     insulin  aspart   Subcutaneous TID w/meals     insulin aspart  1-12 Units Subcutaneous 5 times per day     insulin glargine  9 Units Subcutaneous QAM     insulin NPH  24 Units Subcutaneous Q24H     levalbuterol  1.25 mg Nebulization TID     levothyroxine  25 mcg Oral Daily     magnesium oxide  400 mg Oral QPM     magnesium oxide  800 mg Oral QAM     multivitamin w/minerals  1 tablet Oral Daily     mycophenolate  1,000 mg Oral BID     nystatin  1,000,000 Units Swish & Swallow 4x Daily     pantoprazole  40 mg Oral BID AC     predniSONE  10 mg Oral or Feeding Tube BID     QUEtiapine  50 mg Oral At Bedtime     rosuvastatin  10 mg Oral or Feeding Tube Daily     sodium chloride (PF)  10-40 mL Intracatheter Q8H     sulfamethoxazole-trimethoprim  1 tablet Oral or Feeding Tube Daily     tacrolimus  2 mg Oral QPM     tacrolimus  2.5 mg Oral QAM     warfarin ANTICOAGULANT  2 mg Oral ONCE at 18:00

## 2021-12-19 NOTE — PLAN OF CARE
"FOCUS/GOAL  Medication management, Wound care management, Medical management, Discharge planning, Mobility, Skin integrity, Safety management, and Prevention of secondary complications    ASSESSMENT, INTERVENTIONS AND CONTINUING PLAN FOR GOAL:  A/Ox4. On RA. Denies pain, CP, N/T, N/V, dizziness. Dyspnea on exertion, has scheduled nebs. Ax1 with gb/walker. Voiding w/o difficulty in bedside urinal, LBM 12/18, continent. Regular diet, fair appetite. , 115. Carb coverage per orders with calorie count. L triple lumen PICC SL in between IV ABX. Old trach site gauze dressing changed. PEG site dressing changed, flushed per orders. TF 6P-6A. Pt able to make needs known and call light within reach. Continue POC     /74 (BP Location: Right arm)   Pulse 99   Temp 98.2  F (36.8  C) (Oral)   Resp 18   Ht 1.646 m (5' 4.8\")   Wt 68.3 kg (150 lb 9.6 oz)   SpO2 93%   BMI 25.22 kg/m       "

## 2021-12-19 NOTE — PLAN OF CARE
Discharge Planner Post-Acute Rehab SLP:      Discharge Plan: [facility] with fiance      Precautions: aspiration precautions, sternal, fall     Current Status:  Communication: WFL. Mildly breathy, trach stoma covered.  Cognition: Mild-moderate cognitive impairments with deficits re: attention, exeuctive function, memory, and visuospatial skills.  Swallow: Regular, thin liquid. Medications to be given orally with thin liquids. Will continue to monitor.      Assessment:   SLP: worked on task for planning, reasoning mild difficulty with attention and planning. Also engaged in memory task, mild difficulty with retention. Did well with instructing S.O with new activities/strategies.  Other Barriers to Discharge (Family Training, etc):

## 2021-12-20 ENCOUNTER — APPOINTMENT (OUTPATIENT)
Dept: SPEECH THERAPY | Facility: CLINIC | Age: 56
End: 2021-12-20
Attending: PHYSICAL MEDICINE & REHABILITATION
Payer: COMMERCIAL

## 2021-12-20 ENCOUNTER — APPOINTMENT (OUTPATIENT)
Dept: OCCUPATIONAL THERAPY | Facility: CLINIC | Age: 56
End: 2021-12-20
Attending: PHYSICAL MEDICINE & REHABILITATION
Payer: COMMERCIAL

## 2021-12-20 ENCOUNTER — APPOINTMENT (OUTPATIENT)
Dept: PHYSICAL THERAPY | Facility: CLINIC | Age: 56
End: 2021-12-20
Attending: PHYSICAL MEDICINE & REHABILITATION
Payer: COMMERCIAL

## 2021-12-20 ENCOUNTER — APPOINTMENT (OUTPATIENT)
Dept: CT IMAGING | Facility: CLINIC | Age: 56
End: 2021-12-20
Attending: PHYSICIAN ASSISTANT
Payer: COMMERCIAL

## 2021-12-20 ENCOUNTER — APPOINTMENT (OUTPATIENT)
Dept: GENERAL RADIOLOGY | Facility: CLINIC | Age: 56
End: 2021-12-20
Attending: PHYSICIAN ASSISTANT
Payer: COMMERCIAL

## 2021-12-20 LAB
ANION GAP SERPL CALCULATED.3IONS-SCNC: 7 MMOL/L (ref 3–14)
BACTERIA BRONCH: ABNORMAL
BACTERIA BRONCH: NO GROWTH
BACTERIA PLR CULT: ABNORMAL
BASOPHILS # BLD AUTO: 0 10E3/UL (ref 0–0.2)
BASOPHILS NFR BLD AUTO: 0 %
BUN SERPL-MCNC: 30 MG/DL (ref 7–30)
CALCIUM SERPL-MCNC: 9.2 MG/DL (ref 8.5–10.1)
CHLORIDE BLD-SCNC: 108 MMOL/L (ref 94–109)
CO2 SERPL-SCNC: 27 MMOL/L (ref 20–32)
CREAT SERPL-MCNC: 0.82 MG/DL (ref 0.66–1.25)
EOSINOPHIL # BLD AUTO: 0.1 10E3/UL (ref 0–0.7)
EOSINOPHIL NFR BLD AUTO: 1 %
ERYTHROCYTE [DISTWIDTH] IN BLOOD BY AUTOMATED COUNT: 14.1 % (ref 10–15)
GFR SERPL CREATININE-BSD FRML MDRD: >90 ML/MIN/1.73M2
GLUCOSE BLD-MCNC: 129 MG/DL (ref 70–99)
GLUCOSE BLDC GLUCOMTR-MCNC: 110 MG/DL (ref 70–99)
GLUCOSE BLDC GLUCOMTR-MCNC: 131 MG/DL (ref 70–99)
GLUCOSE BLDC GLUCOMTR-MCNC: 138 MG/DL (ref 70–99)
GLUCOSE BLDC GLUCOMTR-MCNC: 160 MG/DL (ref 70–99)
GLUCOSE BLDC GLUCOMTR-MCNC: 89 MG/DL (ref 70–99)
HCT VFR BLD AUTO: 31.4 % (ref 40–53)
HGB BLD-MCNC: 9.5 G/DL (ref 13.3–17.7)
IMM GRANULOCYTES # BLD: 0.2 10E3/UL
IMM GRANULOCYTES NFR BLD: 2 %
INR PPP: 2.22 (ref 0.85–1.15)
LYMPHOCYTES # BLD AUTO: 0.9 10E3/UL (ref 0.8–5.3)
LYMPHOCYTES NFR BLD AUTO: 10 %
MAGNESIUM SERPL-MCNC: 1.5 MG/DL (ref 1.6–2.3)
MCH RBC QN AUTO: 27.5 PG (ref 26.5–33)
MCHC RBC AUTO-ENTMCNC: 30.3 G/DL (ref 31.5–36.5)
MCV RBC AUTO: 91 FL (ref 78–100)
MONOCYTES # BLD AUTO: 0.8 10E3/UL (ref 0–1.3)
MONOCYTES NFR BLD AUTO: 8 %
NEUTROPHILS # BLD AUTO: 7.3 10E3/UL (ref 1.6–8.3)
NEUTROPHILS NFR BLD AUTO: 79 %
NRBC # BLD AUTO: 0 10E3/UL
NRBC BLD AUTO-RTO: 0 /100
PLATELET # BLD AUTO: 165 10E3/UL (ref 150–450)
POTASSIUM BLD-SCNC: 4.2 MMOL/L (ref 3.4–5.3)
RBC # BLD AUTO: 3.45 10E6/UL (ref 4.4–5.9)
SODIUM SERPL-SCNC: 142 MMOL/L (ref 133–144)
TACROLIMUS BLD-MCNC: 9.3 UG/L (ref 5–15)
TACROLIMUS BLD-MCNC: 9.9 UG/L (ref 5–15)
TME LAST DOSE: NORMAL H
WBC # BLD AUTO: 9.3 10E3/UL (ref 4–11)

## 2021-12-20 PROCEDURE — 94640 AIRWAY INHALATION TREATMENT: CPT | Mod: 76

## 2021-12-20 PROCEDURE — 999N000125 HC STATISTIC PATIENT MED CONFERENCE < 30 MIN: Performed by: STUDENT IN AN ORGANIZED HEALTH CARE EDUCATION/TRAINING PROGRAM

## 2021-12-20 PROCEDURE — 250N000012 HC RX MED GY IP 250 OP 636 PS 637: Performed by: INTERNAL MEDICINE

## 2021-12-20 PROCEDURE — 71260 CT THORAX DX C+: CPT | Mod: 26 | Performed by: RADIOLOGY

## 2021-12-20 PROCEDURE — 99233 SBSQ HOSP IP/OBS HIGH 50: CPT | Mod: 24 | Performed by: PHYSICIAN ASSISTANT

## 2021-12-20 PROCEDURE — 71046 X-RAY EXAM CHEST 2 VIEWS: CPT

## 2021-12-20 PROCEDURE — 71046 X-RAY EXAM CHEST 2 VIEWS: CPT | Mod: 26 | Performed by: RADIOLOGY

## 2021-12-20 PROCEDURE — 250N000013 HC RX MED GY IP 250 OP 250 PS 637: Performed by: PHYSICAL MEDICINE & REHABILITATION

## 2021-12-20 PROCEDURE — 250N000011 HC RX IP 250 OP 636: Performed by: PHYSICAL MEDICINE & REHABILITATION

## 2021-12-20 PROCEDURE — 71260 CT THORAX DX C+: CPT

## 2021-12-20 PROCEDURE — 250N000009 HC RX 250: Performed by: PHYSICAL MEDICINE & REHABILITATION

## 2021-12-20 PROCEDURE — 85610 PROTHROMBIN TIME: CPT | Performed by: INTERNAL MEDICINE

## 2021-12-20 PROCEDURE — 97530 THERAPEUTIC ACTIVITIES: CPT | Mod: GP

## 2021-12-20 PROCEDURE — 97535 SELF CARE MNGMENT TRAINING: CPT | Mod: GO

## 2021-12-20 PROCEDURE — 80197 ASSAY OF TACROLIMUS: CPT | Performed by: PHYSICIAN ASSISTANT

## 2021-12-20 PROCEDURE — 999N000125 HC STATISTIC PATIENT MED CONFERENCE < 30 MIN: Performed by: SPEECH-LANGUAGE PATHOLOGIST

## 2021-12-20 PROCEDURE — 999N000157 HC STATISTIC RCP TIME EA 10 MIN

## 2021-12-20 PROCEDURE — 99232 SBSQ HOSP IP/OBS MODERATE 35: CPT | Performed by: INTERNAL MEDICINE

## 2021-12-20 PROCEDURE — 250N000009 HC RX 250: Performed by: INTERNAL MEDICINE

## 2021-12-20 PROCEDURE — 94664 DEMO&/EVAL PT USE INHALER: CPT

## 2021-12-20 PROCEDURE — 250N000013 HC RX MED GY IP 250 OP 250 PS 637: Performed by: INTERNAL MEDICINE

## 2021-12-20 PROCEDURE — 85025 COMPLETE CBC W/AUTO DIFF WBC: CPT | Performed by: PHYSICIAN ASSISTANT

## 2021-12-20 PROCEDURE — 999N000150 HC STATISTIC PT MED CONFERENCE < 30 MIN

## 2021-12-20 PROCEDURE — 92507 TX SP LANG VOICE COMM INDIV: CPT | Mod: GN | Performed by: SPEECH-LANGUAGE PATHOLOGIST

## 2021-12-20 PROCEDURE — 80197 ASSAY OF TACROLIMUS: CPT

## 2021-12-20 PROCEDURE — 128N000003 HC R&B REHAB

## 2021-12-20 PROCEDURE — 250N000013 HC RX MED GY IP 250 OP 250 PS 637: Performed by: PHYSICIAN ASSISTANT

## 2021-12-20 PROCEDURE — 82310 ASSAY OF CALCIUM: CPT | Performed by: PHYSICIAN ASSISTANT

## 2021-12-20 PROCEDURE — 250N000012 HC RX MED GY IP 250 OP 636 PS 637: Performed by: NURSE PRACTITIONER

## 2021-12-20 PROCEDURE — 97530 THERAPEUTIC ACTIVITIES: CPT | Mod: GO

## 2021-12-20 PROCEDURE — 83735 ASSAY OF MAGNESIUM: CPT | Performed by: PHYSICIAN ASSISTANT

## 2021-12-20 PROCEDURE — 94640 AIRWAY INHALATION TREATMENT: CPT

## 2021-12-20 PROCEDURE — 97110 THERAPEUTIC EXERCISES: CPT | Mod: GP

## 2021-12-20 PROCEDURE — 36592 COLLECT BLOOD FROM PICC: CPT | Performed by: INTERNAL MEDICINE

## 2021-12-20 PROCEDURE — 99233 SBSQ HOSP IP/OBS HIGH 50: CPT | Mod: 24 | Performed by: PHYSICAL MEDICINE & REHABILITATION

## 2021-12-20 RX ORDER — IOPAMIDOL 755 MG/ML
100 INJECTION, SOLUTION INTRAVASCULAR ONCE
Status: COMPLETED | OUTPATIENT
Start: 2021-12-20 | End: 2021-12-20

## 2021-12-20 RX ORDER — MAGNESIUM OXIDE 400 MG/1
800 TABLET ORAL 2 TIMES DAILY
Status: DISCONTINUED | OUTPATIENT
Start: 2021-12-20 | End: 2021-12-30 | Stop reason: HOSPADM

## 2021-12-20 RX ORDER — WARFARIN SODIUM 2 MG/1
2 TABLET ORAL
Status: COMPLETED | OUTPATIENT
Start: 2021-12-20 | End: 2021-12-20

## 2021-12-20 RX ORDER — QUETIAPINE FUMARATE 25 MG/1
25 TABLET, FILM COATED ORAL AT BEDTIME
Status: COMPLETED | OUTPATIENT
Start: 2021-12-20 | End: 2021-12-22

## 2021-12-20 RX ADMIN — QUETIAPINE FUMARATE 25 MG: 25 TABLET ORAL at 21:02

## 2021-12-20 RX ADMIN — PANTOPRAZOLE SODIUM 40 MG: 40 TABLET, DELAYED RELEASE ORAL at 17:24

## 2021-12-20 RX ADMIN — INSULIN GLARGINE 9 UNITS: 100 INJECTION, SOLUTION SUBCUTANEOUS at 10:29

## 2021-12-20 RX ADMIN — NYSTATIN 1000000 UNITS: 100000 SUSPENSION ORAL at 08:28

## 2021-12-20 RX ADMIN — Medication 1 PACKET: at 08:35

## 2021-12-20 RX ADMIN — NYSTATIN 1000000 UNITS: 100000 SUSPENSION ORAL at 17:24

## 2021-12-20 RX ADMIN — SODIUM CHLORIDE 65 ML: 9 INJECTION, SOLUTION INTRAVENOUS at 19:46

## 2021-12-20 RX ADMIN — LEVOTHYROXINE SODIUM 25 MCG: 25 TABLET ORAL at 08:46

## 2021-12-20 RX ADMIN — LEVALBUTEROL HYDROCHLORIDE 1.25 MG: 1.25 SOLUTION RESPIRATORY (INHALATION) at 08:37

## 2021-12-20 RX ADMIN — ACETYLCYSTEINE 2 ML: 200 SOLUTION ORAL; RESPIRATORY (INHALATION) at 12:12

## 2021-12-20 RX ADMIN — INSULIN ASPART 6 UNITS: 100 INJECTION, SOLUTION INTRAVENOUS; SUBCUTANEOUS at 10:26

## 2021-12-20 RX ADMIN — LEVALBUTEROL HYDROCHLORIDE 1.25 MG: 1.25 SOLUTION RESPIRATORY (INHALATION) at 12:13

## 2021-12-20 RX ADMIN — CEFTAZIDIME 2 G: 2 INJECTION, POWDER, FOR SOLUTION INTRAVENOUS at 20:13

## 2021-12-20 RX ADMIN — LEVALBUTEROL HYDROCHLORIDE 1.25 MG: 1.25 SOLUTION RESPIRATORY (INHALATION) at 20:16

## 2021-12-20 RX ADMIN — WARFARIN SODIUM 2 MG: 2 TABLET ORAL at 17:23

## 2021-12-20 RX ADMIN — TACROLIMUS 2.5 MG: 1 CAPSULE ORAL at 08:27

## 2021-12-20 RX ADMIN — Medication 25 MG: at 21:13

## 2021-12-20 RX ADMIN — INSULIN ASPART 4 UNITS: 100 INJECTION, SOLUTION INTRAVENOUS; SUBCUTANEOUS at 18:08

## 2021-12-20 RX ADMIN — CEFTAZIDIME 2 G: 2 INJECTION, POWDER, FOR SOLUTION INTRAVENOUS at 12:54

## 2021-12-20 RX ADMIN — CALCIUM CARBONATE 600 MG (1,500 MG)-VITAMIN D3 400 UNIT TABLET 1 TABLET: at 08:28

## 2021-12-20 RX ADMIN — PREDNISONE 10 MG: 10 TABLET ORAL at 08:28

## 2021-12-20 RX ADMIN — ACETYLCYSTEINE 2 ML: 200 SOLUTION ORAL; RESPIRATORY (INHALATION) at 08:38

## 2021-12-20 RX ADMIN — AMLODIPINE BESYLATE 5 MG: 5 TABLET ORAL at 08:29

## 2021-12-20 RX ADMIN — PREDNISONE 10 MG: 10 TABLET ORAL at 21:02

## 2021-12-20 RX ADMIN — PANTOPRAZOLE SODIUM 40 MG: 40 TABLET, DELAYED RELEASE ORAL at 08:27

## 2021-12-20 RX ADMIN — Medication 1 TABLET: at 08:25

## 2021-12-20 RX ADMIN — FLUCONAZOLE, SODIUM CHLORIDE 400 MG: 2 INJECTION INTRAVENOUS at 10:16

## 2021-12-20 RX ADMIN — MULTIPLE VITAMINS W/ MINERALS TAB 1 TABLET: TAB at 08:28

## 2021-12-20 RX ADMIN — TACROLIMUS 2 MG: 1 CAPSULE ORAL at 17:23

## 2021-12-20 RX ADMIN — MYCOPHENOLATE MOFETIL 1000 MG: 250 CAPSULE ORAL at 21:02

## 2021-12-20 RX ADMIN — MYCOPHENOLATE MOFETIL 1000 MG: 250 CAPSULE ORAL at 08:25

## 2021-12-20 RX ADMIN — NYSTATIN 1000000 UNITS: 100000 SUSPENSION ORAL at 12:58

## 2021-12-20 RX ADMIN — Medication 800 MG: at 21:02

## 2021-12-20 RX ADMIN — ACETYLCYSTEINE 2 ML: 200 SOLUTION ORAL; RESPIRATORY (INHALATION) at 20:16

## 2021-12-20 RX ADMIN — ROSUVASTATIN CALCIUM 10 MG: 10 TABLET, FILM COATED ORAL at 08:25

## 2021-12-20 RX ADMIN — FLUTICASONE PROPIONATE 1 SPRAY: 50 SPRAY, METERED NASAL at 08:46

## 2021-12-20 RX ADMIN — ESCITALOPRAM OXALATE 20 MG: 20 TABLET ORAL at 08:28

## 2021-12-20 RX ADMIN — FUROSEMIDE 40 MG: 40 TABLET ORAL at 08:28

## 2021-12-20 RX ADMIN — SULFAMETHOXAZOLE AND TRIMETHOPRIM 1 TABLET: 400; 80 TABLET ORAL at 08:28

## 2021-12-20 RX ADMIN — NYSTATIN 1000000 UNITS: 100000 SUSPENSION ORAL at 21:02

## 2021-12-20 RX ADMIN — CEFTAZIDIME 2 G: 2 INJECTION, POWDER, FOR SOLUTION INTRAVENOUS at 03:16

## 2021-12-20 RX ADMIN — Medication 800 MG: at 08:33

## 2021-12-20 RX ADMIN — CALCIUM CARBONATE 600 MG (1,500 MG)-VITAMIN D3 400 UNIT TABLET 1 TABLET: at 17:23

## 2021-12-20 RX ADMIN — IOPAMIDOL 74 ML: 755 INJECTION, SOLUTION INTRAVENOUS at 19:46

## 2021-12-20 ASSESSMENT — ACTIVITIES OF DAILY LIVING (ADL)
ADLS_ACUITY_SCORE: 26

## 2021-12-20 NOTE — PROGRESS NOTES
Pulmonary Medicine  Cystic Fibrosis - Lung Transplant Team  Progress Note  2021       Patient: Edson Thornton  MRN: [id number]  : 1965 (age 56 year old)  Transplant: 10/16/2021 (Lung), POD#65  Admission date: 2021    Assessment & Plan:     Edson Thornton is a 56 year old male with a PMH significant for NSIP/ILD, bronchiectasis, moderate PH, RA, SALTY, chronic HSV infection, hypogammaglobulinemia, steroid-induced diabetes, hypothyroidism, PFO, HTN, HLD, duodenal anomaly, anxiety, and depression.  Admitted on 21 from OSH for acute on chronic respiratory failure 2/2 ILD exacerbation, now s/p BSLT on 10/16/21. Prolonged vent wean s/p trach (10/29-) and PEG/J tube (10/29). Post-op course also complicated by encephalopathy and diffuse weakness, acute to subacute CVA, afib with RVR, BRENNAN, GI bleed, Candidemia/Candida empyema, and anxiety.  Code called  for bradycardia/asystole, progressive hypotension, found to have significant GI bleed, EGD with adherent clot near PEG tube site that was clipped. Discharged to ARU for ongoing rehabilitation.     Recommendations:   - Coccidioides Ag (blood)  pending  - Increase Mg oxide to 800 mg BID (ordered)  - Continue ceftazidime and fluconazole until seen by Transplant ID, tomorrow,   - Tacrolimus level on Thursday,  (ordered); will need adjustment if fluconazole discontinued (recommend waiting until Thursday level)  - DSA level on Monday,  (ordered)  - IgG on  (ordered)  - Prednisone taper due 22 (not ordered)  - Evaluate need for BiPAP after trach site has healed    S/p BSLT for ILD:  Acute hypoxic respiratory failure, Resolved:   Prolonged vent weaning s/p trach, Resolved:  Bilateral pleural effusions: Explant pathology with NSIP, no malignancy. Prolonged vent wean s/p trach and PEG/J tube placement with thoracic surgery 10/29. Code called  for bradycardia/asystole (required 1 round of CPR, no medications) then  progressive hypotension, GI bleed as below. S/p left chest tube placement (11/3-11/30) for pleural effusion/empyem s/p lytics 11/25-11/28 (CXR showing trapped left lung), right thoracentesis 11/27 (cultures negative).  Chest CT (11/22) with new LLL cavitary lesion with multifocal GGO in BUL, new 1.8 cm fluid collection with surrounding fat stranding in the left axilla, decreased bilateral loculated pleural effusions, and similar small pericardial effusion. Hypoxia resolved 12/1, trach decannulated 12/8. CMV/EBV (12/15) negative. No new pulmonary complaints today, continues to have dyspnea with activity (mainly standing), but slowing improving. CXR reviewed and demonstrates small effusions and LLL cavitary lesions; scheduled for CT chest later today.   - Nebs: levalbuterol and Mucomyst TID  - Pulmonary toilet with chest physiotherapy BID  - Volume management per primary team  - CXR Mondays  - CMP, Mg++, CBC qMTh     Immunosuppression: s/p induction therapy with basiliximab 10/16 (and high dose IV steroid) and 10/20  - Tacrolimus 2.5 mg qAM / 2 mg qPM (decreased 12/11).  Goal level 8-12. Level today 9.9 at 13 1/2 hours; no dose change, recheck a level on Thursday, 12/23  - MMF 1000 mg BID (decreased 11/2 given GI bleed, AZA to be avoided given TPMT)  - Prednisone 10 mg BID, next taper due 1/2/22 (not yet ordered)    Date AM dose (mg) PM dose (mg)   12/5/21 10 10   1/2/22 10 7.5   1/30/22 7.5 7.5   2/27/22 7.5 5   3/27/22 5 5   4/24/22 5 2.5      Prophylaxis:   - Bactrim for PJP ppx (held 11/2-11/5 d/t BRENNAN)  - Nystatin for oral candidiasis ppx, 6 month course  - See below for serologies and viral ppx:      Donor Recipient Intervention   CMV status Negative Negative None, CMV monthly   EBV status Positive Positive None, EBV monthly    HSV status N/A Positive S/p acyclovir POD #1-30 (recent infection history pre-txp)      Positive cross match:   Positive DSA: Note that he received two doses of rituximab in June, which is  likely contributing to cross match result.  DSA newly positive 11/29 with DQB5 (mfi 2522), decreased (mfi 1873) on 12/6. DSA on 12/12, decreased further to 1703.   - Repeat DSA every two weeks, next on 12/27     ID: Concern for possible Strongyloides exposure pre-transplant s/p ivermectin x 1 dose (9/17). Donor and recipient cultures NGTD. S/p IV ceftazidime/vancomycin for 48h per protocol and additional empiric ceftazidime 10/19-10/23 given recurrent fevers as below. Cryptococcal Ag negative 10/29. Reports some blood mixed in sputum 12/3, persisting intermittently. Bacterial (12/5) and fungal (12/4) sputum cultures NGTD  - Monthly blood Coccidioides Ag x 12 months post-transplant per ID (urine and serum negative 11/17), pending from 12/17     Klebsiella pneumoniae:  Pseudomonas fluorescens/putida:   LLL cavity: Klebsiella initially noted trach sputum culture 11/10. Started on ceftriaxone 11/11, transitioned to ertapenem 11/12-11/13, and back to ceftriaxone 11/14.  Bronch culture 11/12 with Klebsiella and Pseudomonas fluorescens/putida (R-meropenem).  Chest CT 11/22 with LLL cavity as above, BAL with Klebsiella pneumoniae. Histo Ag 11/22, 11/23 and Blast Ag 11/22 negative. S/p Zosyn (11/15-11/23) and levofloxacin (11/23-12/7).   - ABX: ceftazidime (11/23-12/21) per transplant ID  - Transplant ID follow up scheduled 12/21 with repeat chest CT prior      Disseminated Candida: Noted on blood cultures 10/20 and 10/22.  BDG fungitell positive (399) on 10/20.  Respiratory cultures with persistent Candida albicans.  TC 10/23 without evidence of endocarditis.  Ophthalmology consult 10/24 with benign dilated fundoscopic exam.  Candida empyema also noted 10/25, chest tubes inadvertently removed by CVTS 10/28, left chest tube replaced by IR 11/3 as above with ongoing Candida on cultures (11/3, 11/18).  BDG fungitell positive (>500) and Aspergillus galactomannan negative 11/22.  - Fluconazole (10/26-12/21) per transplant  "ID      HSV: Chronic intermittent active infection pre-transplant with recent HSV infection: crusted lesions throughout left side of jaw, s/p 10 day treatment course of ACV through 10/9.  HSV PCR blood negative 10/17. S/p ACV ppx as above.     Hypogammaglobulinemia: IgG previously low at 364 (9/7).  Noted at 265 at time of transplant, s/p IVIG 10/21, repeat IgG (11/17) 198, s/p IVIG (with premedication) 11/18. Repeat IVIG on 12/15 of 365; IVIG indicated (and ordered) per protocol, but was told ARU is unable to administer IVIG and patient is unable to leave to receive it in an infusion center.  - Repeat IgG ~ 12/30      SALTY: Noted pre-transplant. Home CPAP 6-12 cm H2O.  - Evaluate need for BiPAP after trach site has healed     Hypomagnesemia: Suppressed reabsorption 2/2 CNI.  Mg low at 1.5 today.   - Increase Mg oxide to 800 mg IBD    Angela Luna PA-C  [id number]  After 5pm, on holidays and weekends, please page Pulm [facility]     Subjective & Interval History:     No new pulmonary complaints, notes ongoing shortness of breath with standing/activity, improving. No fever, chills, does have a productive cough in the am, but this is unchanged as well. No nausea or vomiting, BMs are loose. Tremor is unchanged.     Review of Systems:     Please see interval history, otherwise 10 point review negative    Physical Exam:     Vital signs:  Temp: 97.6  F (36.4  C) Temp src: Oral BP: 112/69 Pulse: 97   Resp: 20 SpO2: 95 % O2 Device: None (Room air)   Height: 164.6 cm (5' 4.8\") Weight: 68.3 kg (150 lb 9.6 oz)  I/O:     Intake/Output Summary (Last 24 hours) at 12/13/2021 1620  Last data filed at 12/13/2021 1310  Gross per 24 hour   Intake 720 ml   Output 1530 ml   Net -810 ml     General: alert, standing by the bed playing dominoes  HEENT: NCAT, EOMI, oral mucosa pink and moist  Resp: good breath sounds, mainly clear with few scattered basilar crackles and decreased in bases  CV: RRR, S1S2, no murmur, no edema  Abdomen: NABS, soft  MSK: " moving around the room  Neuro: AAO  Psych: normal affect    Lines, Drains, and Devices:  PICC Triple Lumen 11/04/21 Left Basilic Access. PICC okay to use. (Active)   Site Assessment WDL except;Ecchymotic 12/13/21 0837   External Cath Length (cm) 2.5 cm 12/11/21 1219   Extremity Circumference (cm) 28 cm 11/28/21 1038   Dressing Intervention Chlorhexidine patch;Transparent 12/13/21 0837   Dressing Change Due 12/18/21 12/13/21 0000   PICC Comment CDI 12/13/21 0000   Gray - Status heparin locked 12/13/21 0837   Mosley - Cap Change Due 12/14/21 12/13/21 0000   Red - Status heparin locked 12/13/21 0837   Red - Cap Change Due 12/14/21 12/13/21 0000   White - Status heparin locked 12/13/21 0837   White - Cap Change Due 12/14/21 12/13/21 0000   Extravasation? No 12/13/21 0000   Line Necessity Yes, meets criteria 12/13/21 0837   Number of days: 39     Data:     LABS    CMP:   Recent Labs   Lab 12/20/21  1217 12/20/21  0824 12/20/21  0752 12/20/21  0312 12/16/21  0831 12/16/21  0705 12/15/21  1704 12/15/21  1139 12/14/21  1142 12/14/21  1106   NA  --   --  142  --   --  137  --   --   --  135   POTASSIUM  --   --  4.2  --   --  4.8  --  4.4  --  5.0   CHLORIDE  --   --  108  --   --  106  --   --   --  101   CO2  --   --  27  --   --  27  --   --   --  25   ANIONGAP  --   --  7  --   --  4  --   --   --  9   GLC 89 110* 129* 160*   < > 90   < >  --    < > 115*   BUN  --   --  30  --   --  29  --   --   --  33*   CR  --   --  0.82  --   --  0.74  --   --   --  0.82   GFRESTIMATED  --   --  >90  --   --  >90  --   --   --  >90   ERIK  --   --  9.2  --   --  9.5  --   --   --  9.9   MAG  --   --  1.5*  --   --   --   --   --   --   --     < > = values in this interval not displayed.     CBC:   Recent Labs   Lab 12/20/21  0752 12/17/21  0759 12/16/21  1611 12/15/21  0647   WBC 9.3 11.6* 15.8* 11.0   RBC 3.45* 3.48* 3.79* 3.52*   HGB 9.5* 9.9* 10.6* 10.2*   HCT 31.4* 31.2* 33.7* 31.7*   MCV 91 90 89 90   MCH 27.5 28.4 28.0 29.0   MCHC  30.3* 31.7 31.5 32.2   RDW 14.1 14.0 14.1 14.2    171 199 193       INR:   Recent Labs   Lab 12/20/21  0752 12/19/21  0622 12/18/21  0635 12/17/21  0759   INR 2.22* 2.54* 2.66* 2.77*       Glucose:   Recent Labs   Lab 12/20/21  1217 12/20/21  0824 12/20/21  0752 12/20/21  0312 12/19/21  2201 12/19/21  1850   GLC 89 110* 129* 160* 110* 106*       Blood Gas: No lab results found in last 7 days.    Culture Data No results for input(s): CULT in the last 168 hours.    Virology Data:   Lab Results   Component Value Date    FLUAH1 Negative 11/22/2021    FLUAH3 Negative 11/22/2021    KS1273 Negative 11/22/2021    IFLUB Negative 11/22/2021    RSVA Negative 11/22/2021    RSVB Negative 11/22/2021    PIV1 Negative 11/22/2021    PIV2 Negative 11/22/2021    PIV3 Negative 11/22/2021    HMPV Negative 11/22/2021    HRVS Negative 11/22/2021    ADVBE Negative 11/22/2021    ADVC Negative 11/22/2021    ADVC Negative 11/12/2021    ADVC Negative 10/17/2021       Historical CMV results (last 3 of prior testing):  Lab Results   Component Value Date    CMVQNT Not Detected 12/15/2021    CMVQNT Not Detected 11/22/2021    CMVQNT Not Detected 11/16/2021     No results found for: CMVLOG    Urine Studies    Recent Labs   Lab Test 11/01/21  1336 10/25/21  1507   URINEPH 5.5 5.5   NITRITE Negative Negative   LEUKEST Negative Negative   WBCU 0 2       Most Recent Breeze Pulmonary Function Testing (FVC/FEV1 only)  No results found for: 20002  No results found for: 20003  No results found for: 20015  No results found for: 20016    IMAGING    No results found for this or any previous visit (from the past 48 hour(s)).

## 2021-12-20 NOTE — PLAN OF CARE
Discharge Planner Post-Acute Rehab OT:     Discharge Plan: to [facility] with fiance    Precautions: sternal, fall    Current Status:  ADLs:    Mobility: CGA for in room mobility using walker    Grooming: CGA standing at the sink, chair behind for intermittent rest breaks    Dressing: TBD UB dsg (unable d/t nsg request leave IV running on 12/15), Mod A LB dsg    Bathing: Min A w/shower chair    Toileting: CGA for transfer using walker, SBA for [place]-cares to follow precautions  IADLs: fiance will assist  Vision/Cognition: L visual deficit at baseline and new right eye blind spot. Pt scored 24/30 on the SLUMS indicating mild deficit    Assessment: Pt ambulated bed<>bathroom with decreased SOB, completing toilet transfer with CGA using FWW, SBA for [place] cares for maintaining sternal precautions. Standing at sink 3 x 1.5 minutes before seated rest breaks. Engaging in grooming/oral hygiene with CGA at sink, with increased fatigue completing oral hygiene while seated. Transitioned to gym in wheelchair, engaging in standing tolerance activities at pantry with sorting objects and visual scanning while maintaining sternal precautions. Pt standing 3x for 2 minutes each before requiring seated rest breaks. Per pt report, increased fatigue with standing activity and requires ~2-3 minutes for recovery. Pt engaged in standing cognitive and visual scanning activity in room with increased standing tolerance with arm support on tabletop surface, maintaining standing ~4.5 minutes before seated rest break. Pt continues to benefit from skilled OT services to continue to progress independence in ADL/IADLs and standing tolerance. Continue with OT POC.       Other Barriers to Discharge (DME, Family Training, etc): poor activity endurance

## 2021-12-20 NOTE — PROGRESS NOTES
[facility]    Progress Note - TCU Service        Date of Admission:  12/13/2021    Assessment & Plan             Mr. Thornton is a 57 yo M with PMHx of NSIP/ILD 2/2 Rheumatoid lung disease, s/p bilateral lung transplant, failure to wean off the ventilator, s/p tracheostomy and PEG tube insertion, rheumatoid arthritis, bronchiectasis, pulmonary HTN, SALTY, essential HTN, HLD, PLD, hypogammaglobinemia, duodenal anomaly, anxiety, and depression admitted to [facility] on 12/13/2021 for ongoing rehabilitation s/p [facility] hospitalization from 09/05/2021 to 12/13/2021.     Today's changes: 12/2021  Doing well.   No new concern. No changes.  Repeat weekly CXR  CBC/BMP labs per transplant pulmonology  Monthly EBV/CMV levels ordered    # ILD and NSIP s/p BSLT on 10/16/2021  # Acute on chronic hypoxic respiratory failure s/p tracheostomy on 10/29/21 and decannulation on 12/8/2021  # Bilateral pleural effusions, improved.  - Transplant pulmonology following. IS dosing per Pulmonary team.    Prophylaxis:   - Bactrim for PJP ppx (held 11/2-11/5 d/t BRENNAN)  - Nystatin for oral candidiasis ppx, 6 month course  - Monthly EBV, CMV labs (ordered for tomorrow)       -Immunosuppression: s/p induction therapy with basiliximab 10/16 (and high dose IV steroid) and 10/20 Continue tacrolimus 2.5 mg qAM / 2 mg qPM (decreased 12/11).  Goal level 8-12. 12/16: 8.6.  [id number] mg BID (decreased 11/2 given GI bleed, AZA to be avoided given TPMT) , prednisone 10 mg BID, next taper due 1/2/22   - qM/Th CBC/BMP tacrolimus levels    Positive cross match:   Positive DSA: Note that he received two doses of rituximab in June, which is likely contributing to cross match result.  DSA newly positive 11/29 with DQB5 (mfi 2522), decreased (mfi 1873) on 12/6. DSA on 12/12, decreased further to 1703.   - Repeat DSA (12/26) every two weeks     Others:   - Levalbuterol and mucomyst TID and pulm toilet and chest PT BID  -  PT/OT/SLP -@ PM&R team.   - Continue 40 mg of Lasix daily  -  Oxycodone discontinued   - Acetaminophen PRN 975mg tid    # Left lung cavitary lesion   # Klebsiella pneumoniae and Pseudomonas fluorescens/putida HAP  aspiration vs pseudomonal vs Klebsiella pneumonia related abscess. Indeterminate Quant Gold, but negative tuberculin skin tests on mulitple occurences. S/p BAL on 11/22.Cocci antigen in urine negative, serum, BAL histoplasmosis negative, CRAG negative, respiratory viral panel (-), TB (-), candida growth from bal culture. Transplant ID consulted.      - IV Ceftazdime 2 grams Q8H (until 12/21)  - Follow up CT chest in 4 weeks on 12/21 (ordered)  - Follow up with Dr. Abebe on 12/21/2021 @6:30 PM in a virtual visit.     # Hypogammaglobinemia:   IgG previously low at 364 (9/7).  Noted at 265 at time of transplant, s/p IVIG 10/21, repeat IgG (11/17) 198, s/p IVIG (with premedication) 11/18. Repeat IVIG on 12/15 of 365;   Per Pulm: IVIG indicated but ARU  unable to administer IVIG and patient is unable to leave to receive it in an infusion center.  - Repeat IgG ~ 12/30  - Further per Transplant team.     # Encephalopathy, resolved  # Difficulty sleeping  Likely multi-factorial in the setting of stroke, prolonged critical illness.  - Ct Seroquel 25 mg TID prn and 50 mg at bedtime. Wean down as tolerated.   - Melatonin 10 mg at bedtime  - Trazodone 25 mg prn.      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 MRI brain on 10/23 with multifocal subacute infarct within both cerebral hemispheres and left cerebellum. Presumed embolic with afib hx, identified while inpatient. He is currently able to ambulate with relative ease.     - Continue warfarin per pharmacy protocol      # Right subclavian DVT   Diagnosed on 11/4 on ultrasound.  - Continue warfarin per pharmacy protocol  - INR therapeutic.      # DM2 with steroid induced  hyperglycemia  Hemoglobin A1c 6.6 pre-operatively. Endocrine recently consulted for steroids induced hyperglycemia.      -Continue glargine 9IU qAM  -Continue NPH 45 IU at bedtime when on Tube feeds  -Has not required correction insulin over the past 3 days    # Severe malnutrition in the context of acute on chronic illness  Patient currently has a PEG J-tube and is getting the Jorde of nutrition through cycle tube feeds.  - Multivitamin, folic acid, B6, B12 supplements   - TF per nutrition; continue oral diet as well     # Anxiety and depression   Psychiatry reconsulted and after discussion suggest increasing lexapro. Will discuss with patient regarding starting ritalin in the AM for motivation.  -Ct Lexapro 20 mg daily (increased dose)  -As needed hydroxyzine.     # Concern for chipped tooth  # Poor dental hygiene  Patient noted he feels like he may have chipped his tooth or has a loose tooth after eating guevara. It is not painful.   - Dental hygiene consult placed and chipped tooth stable_ rec oral hygiene cares.     # Rheumatoid arthritis- Dx 5/2021 with + CCP antibody and RF. Previously treated with rituximab. Rheum consulted early in admission. On steroids as noted above.      # SALTY - Noted pre-transplant. Home CPAP 6-12 cm H2O.  - Evaluate need for BiPAP after trach site has healed     # HTN- Continue PTA amlodipine.   Stable.      # Hypothyroidism - TSH 3.17 on 11/19/21.   -Continue PTA levothyroxine 25 mcg daily.     # Recurrent GIB - hgb drop on 10/22 s/p 2 unit pRBC transfusion with EGD on 10/23 with NJ/OG tube trauma with scant oozing. Patient then developed progressive hypotension and ultimately CODE BLUE for GIB in setting of anticoagulation with heparin drip, s/p massive transfusion protocol. EGD on 11/2 with large amount of clotted blood in stomach and area of raised mucosa with small adherent clot near PEG tube site that was clipped, no active bleeding.  Maroon stools 11/3, repeat EGD with extensive  old blood in stomach, no active bleeding, small nodular area with prior clips clipped again.  Most recent EGD 11/5 with ulcer noted at PEG tube bumper site, gastritis, and suction marks from G tube. TF noted in G tube drainage bag 11/7, AXR with GJ tube tip projecting over proximal duodenum. GJ tube exchanged 11/9 by GI.     - PO PPI BID    .   # Hypomagnesemia: Suppressed reabsorption 2/2 CNI.  Requiring intermittent IV and PO replacement.  - Mag-Ox 800 mg qAM / 400 mg qPM (increased 12/8)     # Tremors: Potentially tacrolimus related. Ct to monitor.   Improving.      Diet: Snacks/Supplements Adult: Other; Ensure Clear or Gelatein PRN; With Meals  Room Service  Regular Diet Adult Thin Liquids (level 0)  Adult Formula Drip Feeding: Continuous Nepro with Carbsteady; Jejunostomy; Goal Rate: 60 ml/hr x12 hrs from 6 PM to 6 AM; mL/hr; From: 6:00 PM; 6:00 AM; Medication - Feeding Tube Flush Frequency: At least 15-30 mL water before and after medication...  Calorie Counts    DVT Prophylaxis: Warfarin dosed by pharmacy  Watson Catheter: Not present  Fluids: None  Central Lines: None  Code Status: Full Code      Disposition Plan   Expected Discharge: 12/30/2021     Anticipated discharge location:  Awaiting care coordination huddle         The patient's care was discussed with the Attending Physician, Dr. Valdez.    Sherri Layne MD  TCU Service  [facility]  Securely message with the Inside Web Console (learn more here)  Text page via [employer] Paging/Directory        ______________________________________________________________________    Interval History   No acute events overnight. Yesterday's notes reviewed AM. Patient reports his strength is getting better but is concerned about his bowel movements.     Data reviewed today: I reviewed all medications, new labs and imaging results over the last 24 hours. I personally reviewed no images or EKG's today.    Physical Exam    Vital Signs: Temp: 97.6  F (36.4  C) Temp src: Oral BP: 112/69 Pulse: 97   Resp: 20 SpO2: 95 % O2 Device: None (Room air)    Weight: 150 lbs 9.6 oz  General Appearance: AAOx3, sitting on his chair eating breakfast, answers questions appropriately  Respiratory: CTAB, no crackes, rales or wheezing  Cardiovascular: S1, S2, tachycardic, no m/r/g  GI: BS(+), soft, non-tender, non-distended, PEG tube in place  Skin: No MEMO  Other: AAOx3, Hand  weaker on the left side compared to the right. Cranial nerves grossly normal. Strength 5/5 on elbow flexion, 4+ bilaterally for arm extension, 5/5 on leg raise, 5/5 for dorsiflexion. Intentional tremor on bilateral upper extremities improved compared to previous examination    Data   Recent Labs   Lab 12/20/21  0824 12/20/21  0752 12/20/21  0312 12/19/21  0743 12/19/21  0622 12/18/21  0808 12/18/21  0635 12/17/21  0819 12/17/21  0759 12/16/21  1655 12/16/21  1611 12/16/21  0707 12/16/21  0705 12/15/21  1704 12/15/21  1139 12/14/21  1142 12/14/21  1106   WBC  --  9.3  --   --   --   --   --   --  11.6*  --  15.8*  --   --   --   --    < > 13.2*   HGB  --  9.5*  --   --   --   --   --   --  9.9*  --  10.6*  --   --   --   --    < > 11.5*   MCV  --  91  --   --   --   --   --   --  90  --  89  --   --   --   --    < > 90   PLT  --  165  --   --   --   --   --   --  171  --  199  --   --   --   --    < > 218   INR  --  2.22*  --   --  2.54*  --  2.66*  --  2.77*  --   --    < >  --   --   --    < > 2.25*   NA  --  142  --   --   --   --   --   --   --   --   --   --  137  --   --   --  135   POTASSIUM  --  4.2  --   --   --   --   --   --   --   --   --   --  4.8  --  4.4  --  5.0   CHLORIDE  --  108  --   --   --   --   --   --   --   --   --   --  106  --   --   --  101   CO2  --  27  --   --   --   --   --   --   --   --   --   --  27  --   --   --  25   BUN  --  30  --   --   --   --   --   --   --   --   --   --  29  --   --   --  33*   CR  --  0.82  --   --   --   --   --    --   --   --   --   --  0.74  --   --   --  0.82   ANIONGAP  --  7  --   --   --   --   --   --   --   --   --   --  4  --   --   --  9   ERIK  --  9.2  --   --   --   --   --   --   --   --   --   --  9.5  --   --   --  9.9   * 129* 160*   < >  --    < >  --    < >  --    < >  --    < > 90   < >  --    < > 115*    < > = values in this interval not displayed.

## 2021-12-20 NOTE — PLAN OF CARE
FOCUS/GOAL  Bowel management, Medical management, Skin integrity, and Psychosocial needs    ASSESSMENT, INTERVENTIONS AND CONTINUING PLAN FOR GOAL:  Pt A/O x 4, Assist of 1 with walker.  Pt continent of bowel and bladder this shift, using urinal and up to the toilet. LBM 12/19.  Pt experienced shortness of breath after activity, O2 at 94%, RT administered nebulized medications.  Pt chest tube drainage sites are open to air with no drainage, L PICC line dressing changed, Peg tube dressing clean, dry, intact.  Tube feeding started at 1830 running at 60 mL/hr with 100 mL water flush q 4 hours.  Pt had PRN Trazadone.  Pt has tremors in bilateral hands, some tremors in feet when they are not dependent.   and 110 this shift.

## 2021-12-20 NOTE — PLAN OF CARE
FOCUS/GOAL  Bowel management, Bladder management, and Pain management    ASSESSMENT, INTERVENTIONS AND CONTINUING PLAN FOR GOAL:    Pt slept well during the overnight. Denies pain or sob. Using call light appropriately. TF infusing per order via PEG tube. PICC LUE patent and flushing well. Continent  of bowel and bladder. Continue with plan of care.

## 2021-12-20 NOTE — PROGRESS NOTES
CLINICAL NUTRITION SERVICES - REASSESSMENT NOTE     Nutrition Prescription    RECOMMENDATIONS FOR MDs/PROVIDERS TO ORDER:  Please address insulin plan of care/re-consult Endocrinology for recommendations with hold of TF if deemed appropriate and/or address NPH insulin with hold of TF      Malnutrition Status:    Severe malnutrition in the context of acute on chronic illness or injury    Recommendations already ordered by Registered Dietitian (RD):  Place cycled J-TF orders on hold, discontinue Fibersource, and adjust water flushes to 200 ml TID at [phone]    Adjust supplement order to Nepro once daily at breakfast meal     Re-order calorie count x3 more days   Continue regular diet as ordered     Future/Additional Recommendations:  Continue to monitor calorie counts to assess po intake adequacy and ability to discontinue TF  Monitor acceptance of supplement change   Continue to monitor weight and lab trends      EVALUATION OF THE PROGRESS TOWARD GOALS   Diet: Regular  Supplement: Ensure Clear or Gelatein PRN  3-Day Calorie Count:  12/17: 1000 kcal and 20 g protein (estimated from only 1 outside meal - Tommy Darby)   12/18: 1524 kcal and 43 g protein (2 meals, 0 supplements)  12/19: 965 kcal and 41 g protein (2 meals breakfast and dinner - lunch was ordered but no intake documented, 0 supplements)  3 day average PO intake = 1163 kcal (57% minimum energy needs) and 34 g protein (43% minimum protein needs)    Nutrition Support: Cycled J-TF with Nepro w/carbsteady at 60 ml/hr x12 hrs from 6 pm to 6 am with 100 ml water flush q 4 hrs to provide 1296 kcals, 58 gm protein, 110 gm CHO, 9 gm fiber, 1123 ml water/day    Tube feeding tolerance: Pt reports no specific complaints regarding TF today.       NEW FINDINGS   MAR reviewed. Labs reviewed. Pt reviewed during team rounds. MD agreeing of RD suggestion of hold of TF as long as pt also agreeing, RD then visited pt later at bedside, discussed po intakes and holding  TF, pt and family at bedside agreeing with plan of care, pt continues to slowly have increased meal intakes and is noted to have outside food as well, agreeing to trying Nepro orally once daily to supplement intakes.     12/17/21 0656 68.3 kg (150 lb 9.6 oz)   12/13/21 1551 67.9 kg (149 lb 12.8 oz)     12/12/21 0600 67 kg (147 lb 11.2 oz)   12/05/21 0500 66 kg (145 lb 8.1 oz)   11/12/21 0000 66.8 kg (147 lb 4.3 oz)   09/12/21 0500 68.6 kg (151 lb 3.8 oz)   09/05/21 1752 73.3 kg (161 lb 8 oz)        75.6 kg (166 lb 10.7 oz) 09/05/2021 - per CE      Weight assessment: Weight up 5 lbs in >1 week, significant 9.6% weight loss in >3 months.     MALNUTRITION  % Intake: Intake does not meet criteria as pt was being supplemented with EN support   % Weight Loss: > 7.5% in 3 months (severe)  Subcutaneous Fat Loss: Facial region: mild   Muscle Loss: Temporal: mild   Fluid Accumulation/Edema: None noted  Malnutrition Diagnosis: Severe malnutrition in the context of acute on chronic illness or injury     Previous Goals   Patient to consume % of nutritionally adequate meal trays TID, or the equivalent with supplements/snacks to be able to be wean from tube feeding  Evaluation: Improving/not quite yet met     Previous Nutrition Diagnosis  Inadequate oral intake vs increased nutrient needs related to decreased appetite/post transplant status as evidenced by continued need for enteral nutrition support and therapeutic recommendations  Evaluation: Improving/continue with same diagnosis     CURRENT NUTRITION DIAGNOSIS  Inadequate oral intake vs increased nutrient needs related to decreased appetite/post transplant status as evidenced by continued need for enteral nutrition support and therapeutic recommendations     INTERVENTIONS  Implementation  Re-order calorie count x3 more days   Collaboration with other providers - discussed plan of care with rounding Providers   Enteral Nutrition - Place orders on hold   Feeding tube flush  - adjusted as above in light of TF hold   Medical food supplement therapy - orders adjusted as above     Goals  Patient to consume % of nutritionally adequate meal trays TID, or the equivalent with supplements/snacks.    Monitoring/Evaluation  Progress toward goals will be monitored and evaluated per protocol.    Zenaida Washington RD, CNSC, LD  [facility] RD pager: [phone]  Weekend/holiday pager: [phone]

## 2021-12-20 NOTE — CARE CONFERENCE
"Acute Rehab Care Conference/Team Rounds      Type: Team Rounds    Present: Dr. Johnny Coronado, Susan San PA, Christos Rush PT, Lydia Hogan OT, Liliya Bowen SLP, Carolyne Adams RN CC, Yeimy Churchill SW, Zenaida Washington RD, Valentina Chang RN and Patient Bret Thornton.         Discharge Barriers/Treatment/Education    Rehab Diagnosis: Embolic CVA     Active Medical Co-morbidities/Prognosis: ILD s/p b/l lung transplants, SALTY, HTN, HLD, hypogammaglobulinemia, anxiety, depression, dyspnea, pulmonary effusion, DVT, GI bleed, encephalopathy, tremors, a, fib with RVR, BRENNAN, candidate empyema, candidemia, HCAP, hypothyroidism, DM II, RA, malnutrition s/p PEG tube    Safety: Pt is alert and oriented x4    Pain: Denies pain    Medications, Skin, Tubes/Lines: PEG tube, PICC LUE    Swallowing/Nutrition:SLP: swallowing goal met  Bowel/Bladder: Continent of bowel and bladder, LBM 12/18    Psychosocial:    ADLs/IADLs: Pt's activity tolerance slowly improving. Pt is CGA for in room amb with walker. CGA/SBA for most ADL tasks with exception of LB dressing and bathing. Pt needs to greatly improve his activity tolerance and overall strength. BUE tremors impact ADL as well but pt seems to be adapting well. Pt to discharge to [facility] with fiance. Recommend HH OT.    Mobility: Continues to be limited by SOB, fatigue and anxiety. Working towards more independent amb. Estimated discharge 12/30/21. Considering w/c for home mobility. HH PT at [facility].    Bed Mobility: Min A  Transfer: CGA with WW  Gait: CGA x 15 ft with WW  Stairs: 5 step ups with min A  Balance: Multiple LOB during session, recommending CGA at all times  30 seconds STS from 20\" mat using hands on FWW 12/19/21 = 6, OMNI 3/10  TUG 12/19/21, CGA with FWW = 20 seconds, OMNI 7/10    Cognition/Language: SLP pt improving mild difficulty with more complex cognitive linguistic skills for memory, reasoning/executive function pt may benefit from MAP, further SLP " tx.    Community Re-Entry: w/c based for community distances.    Transportation: requires assist from friends/family.    Decision maker: self    Plan of Care and goals reviewed and updated.    Discharge Plan/Recommendations    Fall Precautions: continue    Overall plan for the patient:   Ongoing medicine, pulmonary, and endocrine teams assistance greatly appreciated for medical management.  PO intake improving and will try holding tube feeds.  Follow up CT chest scheduled for tomorrow.  Functionally, continues to be dyspneic and easily fatigues with activity, able to walk 15 feet before needing a break.  May need WC upon discharge for energy conservation.  With OT, does well with ADLs at a seated level but decreased endurance and tremors continue to be barriers.  Cognition improving but still with mild cognitive deficits noted including decreased executive function and new learning.  Has met swallowing goals.  Will evaluate progress on a weekly basis, current tentative discharge date is 12/30, however anticipate will need longer.  Discharge plan to [facility] with  therapies.        Utilization Review and Continued Stay Justification    Medical Necessity Criteria:    For any criteria that is not met, please document reason and plan for discharge, transfer, or modification of plan of care to address.    Requires intensive rehabilitation program to treat functional deficits?: Yes    Requires 3x per week or greater involvement of rehabilitation physician to oversee rehabilitation program?: Yes    Requires rehabilitation nursing interventions?: Yes    Patient is making functional progress?: Yes    There is a potential for additional functional progress? Yes    Patient is participating in therapy 3 hours per day a minimum of 5 days per week or 15 hours per week in 7 day period?:Yes    Has discharge needs that require coordinated discharge planning approach?:Yes        Barriers/Concerns related to meeting medical  necessity criteria:  Significant deconditioning     Team Plan to Address Concern:  Ongoing therapies to appropriate patient and medical tolerance    Final Physician Sign off    Statement of Approval: I have reviewed and agree with the recommendations and documentation in this care conference note.       Patient Goals  Social Work Goals: RN CC following. No SW needs identified at this time.    OT goal: hygiene/grooming: modified independent  OT goal: upper body dressing: Modified independent  OT goal: lower body dressing: Modified independent  OT goal: upper body bathing: Supervision/stand-by assist  OT goal: lower body bathing: Supervision/stand-by assist  OT Goal: transfer: Supervision/stand-by assist (tub transfer)  OT goal: toilet transfer/toileting: Modified independent,toilet transfer,cleaning and garment management  OT goal: meal preparation: Modified independent,with simple meal preparation  OT goal: cognitive: Patient/caregiver will verbalize understanding of cognitive assessment results/recommendations as needed for safe discharge planning  OT goal 1: Pt will be IND with UB HEP to improve BUE function for ADL    PT Frequency: daily 60-90 minutes  PT goal: bed mobility: Supine to/from sit,Independent  PT goal: transfers: Modified independent,Bed to/from chair,Sit to/from stand (WW)  PT goal: gait: Modified independent,Rolling walker,100 feet  PT goal: stairs: Modified independent,Greater than 10 stairs (12 stairs with rail per home setup)  PT goal 1: Car transfer into family vehicle, SBA using WW       SLP Frequency: daily  SLP Swallow Goal:  Safely tolerate diet without signs/symptoms of aspiration: Regular diet,Thin liquids,With use of swallow precautions   SLP goal 1: Pt will demonstrate and initiate a plan within a structured task with 90% accuracy and min cues  SLP goal 2: Pt will implement strategies to increase attention to visual and verbal information through strutcured and functional tasks with 90%  accuracy and min cues  SLP goal 3: SLP:pt to use memory strategies for new learning/retention (up to 3-4 pieces information )80% accuracy min cues for increased independence with IADLS            RN goal 1. Medication Management: Pt will demonstrate ability to manage medications safely before discharge by completing MAP as needed.  RN goal 2. Skin Integrity: Pt will demonstrate understanding of maintaining skin integrity by turning and repositioning frerquently and asking for assistance as needed.  RN goal 3. Carryover of Rehab Techniques: Pt will utilize therapy techniques to be as independent as possible prior to discharge.  RN goal 4. Patient/ family will attend PLC stroke and coumadin class and verbalize understanding of teachings to discharge.

## 2021-12-20 NOTE — PROGRESS NOTES
[facility]   Acute Rehabilitation Unit  Daily progress note    INTERVAL HISTORY  Weekend notes reviewed.  Pt seen in team rounds with david present.  No acute events overnight.  This morning has no new concerns or complaints.  Still with dyspnea on exertion and tremors, both of which he reports are stable.  No chest pain other than pain at his prior chest tube site.  His appetite is improving, currently eating about 50% of his caloric needs and we will hold tube feeds tonight to try and stimulate appetite further.  Plan for repeat CT chest tomorrow and consult ID for anti-fungal/antibiotic recommendations.  For full functional updates, see team rounds note from today.      MEDICATIONS  Scheduled meds    acetylcysteine  2 mL Nebulization TID     amLODIPine  5 mg Oral Daily     calcium carbonate 600 mg-vitamin D 400 units  1 tablet Oral or Feeding Tube BID w/meals     cefTAZidime  2 g Intravenous Q8H     escitalopram  20 mg Oral or Feeding Tube Daily     fluconazole  400 mg Intravenous Q24H     fluticasone  1 spray Both Nostrils Daily     folic acid-vit B6-vit B12  1 tablet Oral Daily     furosemide  40 mg Oral Daily     insulin aspart  1-7 Units Subcutaneous TID AC     insulin aspart  1-5 Units Subcutaneous At Bedtime     insulin aspart   Subcutaneous TID w/meals     insulin glargine  9 Units Subcutaneous QAM     [Held by provider] insulin NPH  24 Units Subcutaneous Q24H     levalbuterol  1.25 mg Nebulization TID     levothyroxine  25 mcg Oral Daily     magnesium oxide  400 mg Oral QPM     magnesium oxide  800 mg Oral QAM     multivitamin w/minerals  1 tablet Oral Daily     mycophenolate  1,000 mg Oral BID     nystatin  1,000,000 Units Swish & Swallow 4x Daily     pantoprazole  40 mg Oral BID AC     predniSONE  10 mg Oral or Feeding Tube BID     QUEtiapine  50 mg Oral At Bedtime     rosuvastatin  10 mg Oral or Feeding Tube Daily     sodium chloride (PF)  10-40 mL Intracatheter  "Q8H     sulfamethoxazole-trimethoprim  1 tablet Oral or Feeding Tube Daily     tacrolimus  2 mg Oral QPM     tacrolimus  2.5 mg Oral QAM     warfarin ANTICOAGULANT  2 mg Oral ONCE at 18:00       PRN meds:  acetaminophen, albuterol, bisacodyl, dextrose, glucose **OR** dextrose **OR** glucagon, hydrOXYzine, insulin aspart, lidocaine (viscous), magnesium hydroxide, melatonin, naloxone **OR** naloxone **OR** naloxone **OR** naloxone, ondansetron, - MEDICATION INSTRUCTIONS -, prochlorperazine, QUEtiapine, senna-docusate, sodium chloride (PF), traZODone, Warfarin Therapy Reminder, zinc-white petrolatum      PHYSICAL EXAM  /69 (BP Location: Right arm)   Pulse 97   Temp 97.6  F (36.4  C) (Oral)   Resp 20   Ht 1.646 m (5' 4.8\")   Wt 68.3 kg (150 lb 9.6 oz)   SpO2 95%   BMI 25.22 kg/m    Gen: Awake, coooperative  HEENT: atraumatic, no discharge from nares or ears, EOM intact  Cardio: Mildly tachycardic, no murmur auscultated  Pulm: Non-labored breathing, +expiratory wheeze and diminished sounds at R lung base  Abd: Soft, non-tender to palpation, bowel sounds present. PEG c/d/i  Ext: No edema in lower or upper extremities b/l, no calf tenderness  Neuro/MSK: alert, oriented, no new slurring of words, answers questions appropriately, follows commands. +tremor present.      LABS  Recent Labs   Lab 12/20/21  1217 12/20/21  0824 12/20/21  0752 12/19/21  0743 12/19/21  0622 12/18/21  0808 12/18/21  0635 12/17/21  0819 12/17/21  0759 12/16/21  1655 12/16/21  1611 12/16/21  0707 12/16/21  0705 12/15/21  1704 12/15/21  1139 12/14/21  1142 12/14/21  1106   WBC  --   --  9.3  --   --   --   --   --  11.6*  --  15.8*  --   --   --   --    < > 13.2*   HGB  --   --  9.5*  --   --   --   --   --  9.9*  --  10.6*  --   --   --   --    < > 11.5*   MCV  --   --  91  --   --   --   --   --  90  --  89  --   --   --   --    < > 90   PLT  --   --  165  --   --   --   --   --  171  --  199  --   --   --   --    < > 218   INR  --   --  " 2.22*  --  2.54*  --  2.66*  --  2.77*  --   --    < >  --   --   --    < > 2.25*   NA  --   --  142  --   --   --   --   --   --   --   --   --  137  --   --   --  135   POTASSIUM  --   --  4.2  --   --   --   --   --   --   --   --   --  4.8  --  4.4  --  5.0   CHLORIDE  --   --  108  --   --   --   --   --   --   --   --   --  106  --   --   --  101   CO2  --   --  27  --   --   --   --   --   --   --   --   --  27  --   --   --  25   BUN  --   --  30  --   --   --   --   --   --   --   --   --  29  --   --   --  33*   CR  --   --  0.82  --   --   --   --   --   --   --   --   --  0.74  --   --   --  0.82   ANIONGAP  --   --  7  --   --   --   --   --   --   --   --   --  4  --   --   --  9   ERIK  --   --  9.2  --   --   --   --   --   --   --   --   --  9.5  --   --   --  9.9   GLC 89 110* 129*   < >  --    < >  --    < >  --    < >  --    < > 90   < >  --    < > 115*    < > = values in this interval not displayed.     Recent Results (from the past 24 hour(s))   XR Chest 2 Views    Narrative    Chest 2 views    INDICATION: Interval, lung transplant    COMPARISON: 12/16/2021     findings: Clamshell sternotomy from prior bilateral lung transplant  again noted. Continued blunting of both costophrenic angles appears  similar. Heart size normal. Possible cavitary opacity in the left lung  base appears similar.      Impression    IMPRESSION: No significant changes from 12/16/2021. Prior bilateral  lung transplant, bilateral pleural effusions versus scarring and  possible left lung base cavitary opacity.    JODI MENDEZ MD         SYSTEM ID:  [id number]       ASSESSMENT AND PLAN    Edson Thornton is a 56 year old right hand dominant male with a hx of NSIP/ILD associated with rheumatoid lung disease, hypertension, hyperlipidemia, RA who initially presented 9/5/2021 with acute on chronic respiratory failure for transplant work-up s/p bilateral lung transplant (10/16/21) with prolonged hospitalization associated  with multifocal bilateral acute/subacute ischemic infarcts of bilateral frontal, occipital lobes.  Postoperative course complicated by prolonged ventilator wean s/p trach and PEG placement (10/29), encephalopathy, new Afib with RVR, BRENNAN, candidemia/Candida empyema, and brief bradycardic arrest with subsequent hypotension in the setting of GI bleed.  [facility] and was decannulated 12/8/2021.  He is currently stable on room air at admission to acute rehab.  He presents with right upper extremity weakness, bilateral dysmetria/tremors, acute on chronic severe deconditioning, dyspnea on exertion, and impairment of ADLs and gait.     Admission to acute inpatient rehab 12/13/2021  .    Impairment group code: Stroke Ischemic 01.3 Bilateral Involvement: embolic CVA affecting B cerebral hemispheres and L cerebellum, as well as s/p bilateral lung transplantation     1. PT, OT 90 minutes of each on a daily basis, in addition to rehab nursing and close management of physiatrist.   2. Impairment of ADLs/IADLs:  OT for 60 min daily to work on upper and lower body self care, dressing, toileting, bathing, energy conservation techniques with use of ADs as needed.   3. Impairment of mobility:   PT for 60 min daily to work on gait exercises, strengthening, endurance buildup, transfers with use of walker as needed.   4. Impairment of cognition:  SLP for 60 minutes for cognitive evaluation and treatment strategies for higher level cognitive deficits, and memory impairment.  5. Rehab RN to administer medication, patient education on medication taking, VS monitoring, bowel regimen, glucose monitoring and wound care/surgical wound dressing changes and monitoring.   6. Medical Conditions - Hospital ist team consulted, appreciate assistance     Neuro/Psych  #Multifocal acute to subacute ischemic stroke, etiology likely embolic vs perioperative  Initially noted 10/22 and 11/6 with stroke code called  for change in exam.  MRI brain 10/23 with infarcts noted in both cerebral hemispheres (R frontal, L corona radiata, bilateral occipital), left cerebellum, presumed associated with new Afib vs perioperative. Bilateral upper extremity paresis (R>L), suspicion for some cortical blindness  - Stroke risk factor management:              -Warfarin anticoagulation for Afib, pharmacy to dose              -BP goals: SBP<140              - Rosuvastatin 10 mg qday              -Not smoking              -Hgb A1c: 6.6     #Pain  -Acetaminophen 975mg q8h PRN  -Lidocaine patch  -Menthol patch     #Encephalopathy  #Sleep  -Delirium precautions  -Seroquel 50 mg at bedtime, 25mg TID prn; scheduled 25mg discontinued 12/14, continue to wean as able.  Decrease Seroquel further today to 25 mg at bedtime.  -Melatonin 10mg qhs  -Trazodone 25mg prn     #Mood  #History of anxiety and depression  - PTA Lexapro increased to 10 mg while in acute hospital, increased to 20mg 12/14  -Hydroxyzine as needed         Pulmonary  #ILD, NISP, s/p bilateral lung transplant (10/16/2021)  #Acute on chronic respiratory failure s/p tracheostomy (10/29/21) and decannulation (12/8/21)  #Bilateral Pleural Effusions  -Sternal precautions until 12 wks post op (1/8/2022)  -Immunosuppression:              -s/p basiliximab on 10/16, 10/20              -Continue tacrolimus 2.5mg qam and 2mg qhs, goal 8-12              -Continue MMF 1000mg BID              -Continue prednisone 10mg BID  -Monitoring lab/imaging              -DSA q2wks              -Monthly Coccidioides Ag due 1/17              -CMV, EBV per transplant team              -CXR, CBC, BMP, Tac level M/Th  -Continue Levalbuterol and mucomyst nebs QID  -Aggressive pulmonary toilet and chest PT QID  -Continue Lasix 40mg qday     #SALTY  -PTA CPAP     ID  #Immunosuppression Prophylaxsis   -Bactrim x6 months  -Nystatin x6 months     #L lung cavitary lesion  #Hospital-acquired pneumonia with Klebsiella pneumoniae,  Pseudomonas fluorescens/putida  Suspected aspiration versus pseudomonal vs Klebsiella pneumonia related to abscess.  CT on 1025 with left lower lobe nodular opacity with repeat on 11/22 showing new cavitary lesion in left lung base, multifocal bilateral upper lobe groundglass opacity, and new 1.8 cm fluid collection with surrounding fat stranding in left axilla, decreased bilateral loculated pleural effusions.  QuantiFERON gold indeterminate, negative tuberculin skin test on multiple occurrences.  Underwent BAL on 11/22 with cultures growing Klebsiella and Pseudomonas fluorescens/putida, resistant to meropenem.  ID was consulted and feels this is likely fungal.  B-D glucan positive with known candidemia.  Cocci antigen in urine negative, serum histoplasmosis negative, CRAG negative  -IV ceftazidime 2g q8hrs (end 12/21)  -Follow-up chest CT 12/21 and will discuss with ID/Dr. Abebe after     #Invasive Candida albicans candidiasis  Positive Candida blood cultures 10/20, 10/22.  Fungitell positive (399).  TC 10/23 without evidence of endocarditis. Ophthalmic consulted 10/24, benign funduscopic exam.  Candida empyema 10/25.  Repeated pleural effusion fungal cultures and fungal/bacterial blood cultures have been without growth since 11/18.  Initially on micafungin (10/22-10/27) before transition to fluconazole IV  -Continue IV Fluconazole 400mg daily (10/26-12/21)     #hx of HSV  Chronic intermittent infection pretransplant, with recent HSV infection while in acute hospital (crusted lesions along left jaw) s/p 10d treatment until 10/9.     Cardiovascular  #Afib  Initially noted 10/18, converted to NSR with amio ggt. Metoprolol and amiodarone discontinued due to bradycardia. INR 2.22 12/20  -Warfarin as dosed by pharmacy     #Hypertension  -PTA amlodipine 5 mg daily     Heme  #Leukocytosis  New leukocytosis to 13.2 on 12/14--> 11.0 12/15 -->15.8 12/16 --> 11.6 12/17. Given prednisone, likely sits upper limit of normal.  Patient with worsening dyspnea on exertion, SOB on 12/16 but CXR equivocal and remains afebrile. Already on abx/antifungals. May be stress response to when patient becomes more anxious/agitated. Continue to monitor CBCs and for signs/symptoms of infection  -Now returned to normal limits at 9.3 on 12/20.    #R subclavian DVT  -Warfarin anticoagulation, pharmacy to dose     #Hypogammaglobulinemia  S/p IVIG  -Repeat IgG 12/15 365  -Repeat ~12/30     Endocrine  #Type 2 diabetes  #Steroid-induced hyperglycemia  -Endocrine consulted while in acute hospital. Reconsulted 12/14 with anticipation of transitioning TF.  Will plan on holding tube feeds starting tonight.  Will discontinue NPH and add sliding scale.  Continue Lantus 9 units daily     #Hypothyroidism  TSH 3.17 on 11/19  -Continue levothyroxine 25 mcg daily     Renal  #BRENNAN, resolved  Cr 0.82 12/20, stable  -Monitor intermittently     MSK/Rheum  #Rheumatoid arthritis  Previously treated with rituximab.  Rheumatology familiar and consulted early in admission.  -Steroids as above     GI/FEN  #Severe malnutrition  Patient currently has PEG in place.  Now receiving ~50% of nutrition via PO intake.  Will hold tube feeds starting tonight to stimulate appetite further.  Monitor calorie counts.  -Continue multivitamin, folate, B6, B12 supplements  -Dietician assistance appreciated  -Encourage p.o. intake     #hx of GI Bleed  Patient with progressive hypotension and CODE BLUE for bradycardia/asystole associated with GI bleed in the setting of anticoagulation, requiring massive transfusion protocol on 10/22.  EGD on 11/2 with clotted blood in stomach, adherent clot near PEG site that was clipped without active bleeding.  Most recent EGD 11/5 with ulcer noted near the PEG bumper site, gastritis, suction marks from the G-tube.  GJ tube was exchanged 11/9 by GI. Hgb 9.5 12/20  -Monitor hemoglobin intermittently.  -Continue PPI  BID      Bowel/Bladder  Bowel, continent  Constipation  - Senna-docusate 1-2 tablets BID  - Miralax QDAY PRN  - Bisacodyl suppository 10mg QDAY PRN     Bladder, continent     1. Adjustment to disability:  Clinical psychology to eval and treat as indicated  2. FEN: Regular with thin liquids, holding tube feeds  3. Bowel: continent, meds as above  4. Bladder: continent  5. DVT Prophylaxis: warfarin  6. GI Prophylaxis: PPI  7. Code: full  8. Disposition: Local housing - [facility]  9. ELOS:  2-3 weeks  10. Rehab prognosis:  good  9. Follow up Appointments on Discharge:                -Neurology 1-2 months after discharge               -Outpatient cardiac/pulmonary Rehab               -Transplant Coordinator               -AISHA Coronado MD  Department of Rehabilitation Medicine    Time Spent on this Encounter   I, Lan Coronado, spent a total of 35 minutes face-to-face or managing the care of Edson Thornton. Over 50% of my time on the unit was spent counseling the patient and coordinating care. See note for details.

## 2021-12-20 NOTE — PLAN OF CARE
Cognition: Pt is AOx4. Called appropriately for needs.  Pain/neuro: Denied pain. Reported presence of baseline numbess/tingling in extremities. Tremors bilaterally in upper extremities noted.   Skin: No new concerns.  Bowel/bladder: Continent of bowel and bladder.  Transfer: A1 with walker and gait belt.  Vital signs: VSS. Denied SOB, chest pain.  BG at lunchtime was 89. Carb coverage not given. Pt had snack of brook crackers and OJ.  New concerns: Chest x-ray pending results. Tube feeding on hold. Calorie counts continuing. Ate 100% of breakfast.

## 2021-12-20 NOTE — PLAN OF CARE
"Discharge Planner Post-Acute Rehab PT:     Discharge Plan: [facility] with [facility], [place]    Precautions: Falls, dyspnea on exertion    Current Status:  Bed Mobility: Min A  Transfer: CGA with WW  Gait: CGA x 45ft with WW and w/c follow  Stairs: 5 step ups with min A  Balance: Multiple LOB during session and rapid sitting, recommending CGA at all times  30 seconds STS from 20\" mat using hands on FWW 12/19/21 = 6, OMNI 3/10  TUG 12/19/21, CGA with FWW = 20 seconds, OMNI 7/10    Assessment:  Pt continues to be impulsive during transfers, sitting before properly positioned, keeping hands on walker instead of reaching back. When performing STP, use cue of placing hand on surface being transferred to for increased safety. Amb 45ft, FWW, CGA with w/c follow. NuStep for 12min at Lvl 2 with one rest break at 6 min for pursed lip breathing.    Other Barriers to Discharge (DME, Family Training, etc): Significant deconditioning  "

## 2021-12-21 ENCOUNTER — APPOINTMENT (OUTPATIENT)
Dept: SPEECH THERAPY | Facility: CLINIC | Age: 56
End: 2021-12-21
Attending: PHYSICAL MEDICINE & REHABILITATION
Payer: COMMERCIAL

## 2021-12-21 ENCOUNTER — APPOINTMENT (OUTPATIENT)
Dept: OCCUPATIONAL THERAPY | Facility: CLINIC | Age: 56
End: 2021-12-21
Attending: PHYSICAL MEDICINE & REHABILITATION
Payer: COMMERCIAL

## 2021-12-21 ENCOUNTER — APPOINTMENT (OUTPATIENT)
Dept: EDUCATION SERVICES | Facility: CLINIC | Age: 56
End: 2021-12-21
Attending: PHYSICAL MEDICINE & REHABILITATION
Payer: COMMERCIAL

## 2021-12-21 ENCOUNTER — APPOINTMENT (OUTPATIENT)
Dept: PHYSICAL THERAPY | Facility: CLINIC | Age: 56
End: 2021-12-21
Attending: PHYSICAL MEDICINE & REHABILITATION
Payer: COMMERCIAL

## 2021-12-21 LAB
GLUCOSE BLDC GLUCOMTR-MCNC: 106 MG/DL (ref 70–99)
GLUCOSE BLDC GLUCOMTR-MCNC: 124 MG/DL (ref 70–99)
GLUCOSE BLDC GLUCOMTR-MCNC: 133 MG/DL (ref 70–99)
GLUCOSE BLDC GLUCOMTR-MCNC: 149 MG/DL (ref 70–99)
GLUCOSE BLDC GLUCOMTR-MCNC: 168 MG/DL (ref 70–99)
HOLD SPECIMEN: NORMAL
HOLD SPECIMEN: NORMAL
INR PPP: 2.3 (ref 0.86–1.14)
SCANNED LAB RESULT: NORMAL

## 2021-12-21 PROCEDURE — 128N000003 HC R&B REHAB

## 2021-12-21 PROCEDURE — 250N000013 HC RX MED GY IP 250 OP 250 PS 637: Performed by: PHYSICAL MEDICINE & REHABILITATION

## 2021-12-21 PROCEDURE — 250N000013 HC RX MED GY IP 250 OP 250 PS 637: Performed by: PHYSICIAN ASSISTANT

## 2021-12-21 PROCEDURE — 94664 DEMO&/EVAL PT USE INHALER: CPT

## 2021-12-21 PROCEDURE — 250N000011 HC RX IP 250 OP 636: Performed by: PHYSICAL MEDICINE & REHABILITATION

## 2021-12-21 PROCEDURE — 99232 SBSQ HOSP IP/OBS MODERATE 35: CPT | Performed by: INTERNAL MEDICINE

## 2021-12-21 PROCEDURE — 99233 SBSQ HOSP IP/OBS HIGH 50: CPT | Mod: 24 | Performed by: PHYSICIAN ASSISTANT

## 2021-12-21 PROCEDURE — 97129 THER IVNTJ 1ST 15 MIN: CPT | Mod: GN | Performed by: REHABILITATION PRACTITIONER

## 2021-12-21 PROCEDURE — 250N000009 HC RX 250: Performed by: INTERNAL MEDICINE

## 2021-12-21 PROCEDURE — 94640 AIRWAY INHALATION TREATMENT: CPT

## 2021-12-21 PROCEDURE — 97129 THER IVNTJ 1ST 15 MIN: CPT | Mod: GN | Performed by: SPEECH-LANGUAGE PATHOLOGIST

## 2021-12-21 PROCEDURE — 97535 SELF CARE MNGMENT TRAINING: CPT | Mod: GO | Performed by: STUDENT IN AN ORGANIZED HEALTH CARE EDUCATION/TRAINING PROGRAM

## 2021-12-21 PROCEDURE — 97110 THERAPEUTIC EXERCISES: CPT | Mod: GP

## 2021-12-21 PROCEDURE — 97530 THERAPEUTIC ACTIVITIES: CPT | Mod: GO | Performed by: STUDENT IN AN ORGANIZED HEALTH CARE EDUCATION/TRAINING PROGRAM

## 2021-12-21 PROCEDURE — 99223 1ST HOSP IP/OBS HIGH 75: CPT | Performed by: INTERNAL MEDICINE

## 2021-12-21 PROCEDURE — 250N000009 HC RX 250: Performed by: PHYSICAL MEDICINE & REHABILITATION

## 2021-12-21 PROCEDURE — 97130 THER IVNTJ EA ADDL 15 MIN: CPT | Mod: GN | Performed by: REHABILITATION PRACTITIONER

## 2021-12-21 PROCEDURE — 94640 AIRWAY INHALATION TREATMENT: CPT | Mod: 76

## 2021-12-21 PROCEDURE — 85610 PROTHROMBIN TIME: CPT | Performed by: INTERNAL MEDICINE

## 2021-12-21 PROCEDURE — 250N000012 HC RX MED GY IP 250 OP 636 PS 637: Performed by: INTERNAL MEDICINE

## 2021-12-21 PROCEDURE — 36592 COLLECT BLOOD FROM PICC: CPT | Performed by: INTERNAL MEDICINE

## 2021-12-21 PROCEDURE — 999N000157 HC STATISTIC RCP TIME EA 10 MIN

## 2021-12-21 PROCEDURE — 250N000013 HC RX MED GY IP 250 OP 250 PS 637: Performed by: INTERNAL MEDICINE

## 2021-12-21 RX ORDER — WARFARIN SODIUM 2 MG/1
2 TABLET ORAL
Status: COMPLETED | OUTPATIENT
Start: 2021-12-21 | End: 2021-12-21

## 2021-12-21 RX ADMIN — Medication 1 TABLET: at 09:02

## 2021-12-21 RX ADMIN — Medication 25 MG: at 21:48

## 2021-12-21 RX ADMIN — ESCITALOPRAM OXALATE 20 MG: 20 TABLET ORAL at 09:02

## 2021-12-21 RX ADMIN — NYSTATIN 1000000 UNITS: 100000 SUSPENSION ORAL at 11:28

## 2021-12-21 RX ADMIN — NYSTATIN 1000000 UNITS: 100000 SUSPENSION ORAL at 19:46

## 2021-12-21 RX ADMIN — PANTOPRAZOLE SODIUM 40 MG: 40 TABLET, DELAYED RELEASE ORAL at 16:59

## 2021-12-21 RX ADMIN — ACETYLCYSTEINE 2 ML: 200 SOLUTION ORAL; RESPIRATORY (INHALATION) at 08:21

## 2021-12-21 RX ADMIN — Medication 800 MG: at 09:02

## 2021-12-21 RX ADMIN — AMLODIPINE BESYLATE 5 MG: 5 TABLET ORAL at 08:55

## 2021-12-21 RX ADMIN — MYCOPHENOLATE MOFETIL 1000 MG: 250 CAPSULE ORAL at 21:49

## 2021-12-21 RX ADMIN — CEFTAZIDIME 2 G: 2 INJECTION, POWDER, FOR SOLUTION INTRAVENOUS at 11:28

## 2021-12-21 RX ADMIN — LEVALBUTEROL HYDROCHLORIDE 1.25 MG: 1.25 SOLUTION RESPIRATORY (INHALATION) at 12:32

## 2021-12-21 RX ADMIN — CEFTAZIDIME 2 G: 2 INJECTION, POWDER, FOR SOLUTION INTRAVENOUS at 19:47

## 2021-12-21 RX ADMIN — PREDNISONE 10 MG: 10 TABLET ORAL at 21:48

## 2021-12-21 RX ADMIN — ACETYLCYSTEINE 2 ML: 200 SOLUTION ORAL; RESPIRATORY (INHALATION) at 20:44

## 2021-12-21 RX ADMIN — Medication 800 MG: at 21:49

## 2021-12-21 RX ADMIN — NYSTATIN 1000000 UNITS: 100000 SUSPENSION ORAL at 09:08

## 2021-12-21 RX ADMIN — CALCIUM CARBONATE 600 MG (1,500 MG)-VITAMIN D3 400 UNIT TABLET 1 TABLET: at 09:02

## 2021-12-21 RX ADMIN — TACROLIMUS 2 MG: 1 CAPSULE ORAL at 17:45

## 2021-12-21 RX ADMIN — TACROLIMUS 2.5 MG: 1 CAPSULE ORAL at 08:54

## 2021-12-21 RX ADMIN — QUETIAPINE FUMARATE 25 MG: 25 TABLET ORAL at 21:48

## 2021-12-21 RX ADMIN — LEVALBUTEROL HYDROCHLORIDE 1.25 MG: 1.25 SOLUTION RESPIRATORY (INHALATION) at 08:21

## 2021-12-21 RX ADMIN — WARFARIN SODIUM 2 MG: 2 TABLET ORAL at 17:45

## 2021-12-21 RX ADMIN — ACETYLCYSTEINE 2 ML: 200 SOLUTION ORAL; RESPIRATORY (INHALATION) at 12:32

## 2021-12-21 RX ADMIN — CALCIUM CARBONATE 600 MG (1,500 MG)-VITAMIN D3 400 UNIT TABLET 1 TABLET: at 17:45

## 2021-12-21 RX ADMIN — PANTOPRAZOLE SODIUM 40 MG: 40 TABLET, DELAYED RELEASE ORAL at 09:02

## 2021-12-21 RX ADMIN — MYCOPHENOLATE MOFETIL 1000 MG: 250 CAPSULE ORAL at 08:55

## 2021-12-21 RX ADMIN — FUROSEMIDE 40 MG: 40 TABLET ORAL at 09:02

## 2021-12-21 RX ADMIN — MULTIPLE VITAMINS W/ MINERALS TAB 1 TABLET: TAB at 09:02

## 2021-12-21 RX ADMIN — FLUTICASONE PROPIONATE 1 SPRAY: 50 SPRAY, METERED NASAL at 09:19

## 2021-12-21 RX ADMIN — PREDNISONE 10 MG: 10 TABLET ORAL at 08:55

## 2021-12-21 RX ADMIN — LEVOTHYROXINE SODIUM 25 MCG: 25 TABLET ORAL at 09:02

## 2021-12-21 RX ADMIN — INSULIN GLARGINE 9 UNITS: 100 INJECTION, SOLUTION SUBCUTANEOUS at 09:19

## 2021-12-21 RX ADMIN — NYSTATIN 1000000 UNITS: 100000 SUSPENSION ORAL at 16:59

## 2021-12-21 RX ADMIN — CEFTAZIDIME 2 G: 2 INJECTION, POWDER, FOR SOLUTION INTRAVENOUS at 04:09

## 2021-12-21 RX ADMIN — SULFAMETHOXAZOLE AND TRIMETHOPRIM 1 TABLET: 400; 80 TABLET ORAL at 08:55

## 2021-12-21 RX ADMIN — INSULIN ASPART 4 UNITS: 100 INJECTION, SOLUTION INTRAVENOUS; SUBCUTANEOUS at 19:43

## 2021-12-21 RX ADMIN — FLUCONAZOLE, SODIUM CHLORIDE 400 MG: 2 INJECTION INTRAVENOUS at 09:20

## 2021-12-21 RX ADMIN — LEVALBUTEROL HYDROCHLORIDE 1.25 MG: 1.25 SOLUTION RESPIRATORY (INHALATION) at 20:44

## 2021-12-21 RX ADMIN — ROSUVASTATIN CALCIUM 10 MG: 10 TABLET, FILM COATED ORAL at 09:02

## 2021-12-21 ASSESSMENT — ACTIVITIES OF DAILY LIVING (ADL)
ADLS_ACUITY_SCORE: 26
ADLS_ACUITY_SCORE: 24
ADLS_ACUITY_SCORE: 26
ADLS_ACUITY_SCORE: 24
ADLS_ACUITY_SCORE: 26

## 2021-12-21 NOTE — CONSULTS
Stroke Education Note    The following information has been reviewed with the patient and family:    1. Warning signs of stroke    2. Calling 911 if having warning signs of stroke    3. All modifiable risk factors: hypertension, CAD, atrial fib, diabetes, hypercholesterolemia, smoking, substance abuse, diet, physical inactivity, obesity, sleep apnea.    4. Patient's risk factors for stroke which include: HTN, HLD, SALTY, A. Fib, physical inactivity    5. Follow-up plan for after discharge    6. Discharge medications which include: amlodipine, Lasix, insulin, warfarin, rosuvastatin    In addition, the above information was given to the patient and family in writing as a part of the [facility] Stroke Class Handout.    Learner's response to risk factors / lifestyle modification education: Taking steps     Lissette Patel RN

## 2021-12-21 NOTE — PLAN OF CARE
Discharge Planner Post-Acute Rehab OT:     Discharge Plan: to [facility] with fiance    Precautions: sternal, fall    Current Status:  ADLs:    Mobility: CGA for in room mobility using walker    Grooming: CGA standing at the sink, chair behind for intermittent rest breaks    Dressing: TBD UB dsg (unable d/t nsg request leave IV running on 12/15), Mod A LB dsg    Bathing: Min A w/shower chair    Toileting: CGA for transfer using walker, SBA for [place]-cares to follow precautions  IADLs: fiance will assist  Vision/Cognition: L visual deficit at baseline and new right eye blind spot. Pt scored 24/30 on the SLUMS indicating mild deficit    Assessment: Working on safety with short distance amb and transfers. Pt improving when he doesn't rush and when he moves at slow pace. Pt also walked from the therapy gym to his room with CGA and use of walker. WC follow.     Other Barriers to Discharge (DME, Family Training, etc): poor activity endurance

## 2021-12-21 NOTE — PLAN OF CARE
Discharge Planner Post-Acute Rehab SLP:      Discharge Plan: [facility] with fiance , further SLP TBD     Precautions: aspiration precautions, sternal, fall     Current Status:  Communication: WFL.   Cognition: Mild-moderate cognitive impairments with deficits re: attention, exeuctive function, memory, and visuospatial skills, word finding.  Swallow: Regular, thin liquid. Medications to be given orally with thin liquids. Will continue to monitor.      Assessment:  SLP; worked on word finding moderate complex level, mild difficulty generating definitions, and naming items described improved with cueing for alternate words.  Other Barriers to Discharge (Family Training, etc):

## 2021-12-21 NOTE — CONSULTS
[facility]    Transplant Infectious Diseases Inpatient Consultation      Edson Thornton MRN# [id number]   YOB: 1965 Age: 56 year old   Date of Admission and time: 12/13/2021  3:33 PM     Reason for consult: I was asked by Dr. Berry to evaluate this patient for follow up on empyema and pneumonia.             Recommendations:   1. Please continue fluconazole for two additional weeks (until 1/18/2022) to complete 8 weeks of therapy for Genevieve empyema.   2. Please continue ceftazidime for four additional weeks until 1/18/2022 however, when ready for discharge he may be discharged on PO levaquin 500 mg daily.   3. Repeat CT chest without contrast 1/18/2022 before his appointment with me.   4. Please include in the patient's discharge instructions follow up with me in a virtual visit on 1/18/2022 at 6:00 PM.   5. No need to check monthly Coccidioides AG.     ID will sign off, please call for questions.         Summary of Presentation:   Transplants:  10/16/2021 (Lung), Postoperative day:  66     This patient is a 56 year old male with ILD due to RA was on MMF, rituximab and high dose steroids prior to lung transplant. S/p Bi lung transplant. Basiliximab for induction, TAC/MMF/prednisone for maintenance.         Active Problems and Infectious Diseases Issues:   1. LLL cavity.   Aspiration- vs pseudmonal- vs K pneumonia-induced abscess.   I am no sure that this is new or was obscured by pleural effusion on prior CT.      We treated with double coverage ABx (ceftazidime/levofloxacine) for the first few weeks until 12/7/2021 then single coverage with ceftazidime.   The CT is showing improvement but no resolution of the LLL cavity, so will continue therapy with ceftazidime for an additional 4 weeks then repeat CT again.   We should be able to switch to PO levaquin instead of ceftazidime when he's ready to be discharged from the ARU.   He tolerated the combination of  levaquin/fluconazole in the near past without QTc prolongation.      This is less likely to be fungal infection including endemic fungi. Recent Cocci Ag in the urine was negative (resided in the past in endemic areas). Also Histoplasma rarely reactivates (donor and recipient from an endemic areas) and the patient is already on fluconazole which is active against both Histoplasma and Cocci. Will check urinary Histo to be on the safe side (serum Histo was negative). CRAG was negative on 10/29/2021.      2. Invasive candidiasis with C albicans.   Left pleural empyema and candidemia.   The candidemia was positive 11/20/2021 and 11/22/2021, first negative blood cx on 11/23/2021.   The left empyema treated with chest tube 11/18/2021 followed by antifungals starting 11/22/2021.   The right loculated effusion was drained 11/27/2021 with exudate but no evidence of empyema.      The CT is still with loculated effusion but is improving.   Will extend the therapy for additional 4 weeks (total of 8 weeks).      3. Granuloma on CT chest prior to transplant   This was not seen in the pathology of the explanted lungs also no evidence of mycobacterial or fungal infection of the resected LN.   There would be no further indications to check monthly Cocci Ag in the urine.         Old Problems and Infectious Diseases Issues:   1. Significant travel history throughout [place]; was given ivermectin empirically prior to transplant. Serology was negative but was on ritiximab/MMF/high dose steroids when checked.   2. Indeterminate QuantiFERON while on rituximab/MMF/high dose steroids; was in  and traveled to endemic areas but tubeculin skin test was always negative on multiple occasions prior to becoming immunocompromised. Was not deemed to have LTBI.     Other Infectious Disease issues include:  - QTc 377 as of 12/7/2021.   - PCP prophylaxis: bactrim.   - Serostatus: CMV D-/R-, EBV D+/R+, HSV1+/2+, VZV +, Toxo -/-. Received ACV ppx  until 11/15/2021.    - Immunization status: Too early to discuss. Did not receive the flu, the pneumonia vaccines or the COVID-19 vaccine  - Gamma globulin status: 365 as of 12/15/2021, received IVIG on 10/21/2021 and 11/18/2021     Thank you very much Dr. Berry for involving me in the care of Mr. Edson Thornton. Please do not hesitate to call me for any question.     Attestation:  I interviewed the patient and obtained history from the patient, and by reviewing the patient's chart including outside records, microbiological data, and radiological data. All data are summarized in this notes.  Ming Abebe MD  [facility]  Contact information available via [facility] Paging/Directory    12/21/2021             History of Present Illness:   Transplants:  10/16/2021 (Lung), Postoperative day:  66     This patient is a 56 year old male with ILD due to RA was on MMF, rituximab and high dose steroids prior to lung transplant. S/p Bi lung transplant.   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   Also complicated by candidemia with C albicans on 10/202/2021 in the setting of respiratory samples following transplantation with C albicans. Ophthalmology exam was unremarkable.   micafungin was started on 11/22/2021 and switched to fluconazole on 11/26/2021.   The first negative blood cx were from 11/23/2021.   The patient also underwent left chest tube placement.      On repeat CT chest on 11/22/2021, he was found to have LLL cavity and being treated with antibiotics.     The patient is currently in the ARU, no dyspnea, no chest pain, no cough, no fever. All chest tubes were removed. He is currently on room air without tracheostomy.       Exposure History in a prior visit:  Was born in [place], lived in many states including [place] [facility] and [facility]. Currently lives in [place] in a house with his girlfriend/fiancee and her son who's fully vaccinated. No  pets. He used to work as a  all over the country.   He used to be in the [employer] and was stationed in [facility] and [facility]. He also lived in [place].   He was tested for TB by [facility] on many occasions and was negative.   No significant outdoors exposure.   Institutionalized for ETOH use in the past.               Review of Systems:      As mentioned in the HPI otherwise negative by reviewing constitutional symptoms, central and peripheral neurological systems, respiratory system, cardiac system, GI system,  system, musculoskeletal, skin, allergy, and lymphatics.                  Past Medical History:     Past Medical History:   Diagnosis Date     BRENNAN (acute kidney injury) (H) 10/17/2021     Anxiety      Depression      HLD (hyperlipidemia)      HTN (hypertension)      Hypothyroidism      ILD (interstitial lung disease) (H)      SALTY on CPAP      Oxygen dependent     BL 4L since ~6/2021     Rheumatoid arthritis (H)     signs ~5/2020, dx 5/2021     S/P lung transplant (H) 10/16/2021     Shock liver 10/17/2021     Steroid-induced hyperglycemia      Traction bronchiectasis (H)             Past Surgical History:     Past Surgical History:   Procedure Laterality Date     COLONOSCOPY W/ BIOPSIES AND POLYPECTOMY  07/21/2020     CV CORONARY ANGIOGRAM N/A 09/08/2021    Procedure: Coronary Angiogram with possible intervention;  Surgeon: Jovon Bullock MD;  Location:  HEART CARDIAC CATH LAB     CV RIGHT HEART CATH MEASUREMENTS RECORDED N/A 09/08/2021    Procedure: Right Heart Cath;  Surgeon: Jovon Bullock MD;  Location: [facility] CARDIAC CATH LAB     ESOPHAGOSCOPY, GASTROSCOPY, DUODENOSCOPY (EGD), COMBINED N/A 10/23/2021    Procedure: ESOPHAGOGASTRODUODENOSCOPY (EGD);  Surgeon: Miquel Pisano MD;  Location: [facility]     ESOPHAGOSCOPY, GASTROSCOPY, DUODENOSCOPY (EGD), COMBINED N/A 11/02/2021    Procedure: ESOPHAGOGASTRODUODENOSCOPY (EGD);  Surgeon: Daniel Ortiz MD;  Location:  GI      ESOPHAGOSCOPY, GASTROSCOPY, DUODENOSCOPY (EGD), COMBINED N/A 11/5/2021    Procedure: ESOPHAGOGASTRODUODENOSCOPY (EGD);  Surgeon: Ronnell Hernandez MD;  Location: [facility] GI     EXTRACTION(S) DENTAL N/A 09/22/2021    Procedure: EXTRACTION tooth #19;  Surgeon: Deepak Tobin DDS;  Location: [facility] OR     HERNIA REPAIR       IR CHEST TUBE PLACEMENT NON-TUNNELLED LEFT  11/03/2021     PICC TRIPLE LUMEN PLACEMENT Left 11/04/2021    5FR TL PICC. Right non occlusive thrombus subclavian vein.     REPLACE GASTROJEJUNOSTOMY TUBE, PERCUTANEOUS N/A 11/9/2021    Procedure: REPLACEMENT, GASTROJEJUNOSTOMY TUBE, PERCUTANEOUS;  Surgeon: Hernando Rodriguez MD;  Location: [facility] GI     right acl       TRACHEOSTOMY PERCUTANEOUS N/A 10/29/2021    Procedure: Percutaneous Tracheostomy,;  Surgeon: Celine Jenkins MD;  Location: [facility] OR     TRANSPLANT LUNG RECIPIENT SINGLE X2 Bilateral 10/16/2021    Procedure: TRANSPLANT, LUNG, RECIPIENT, BILATERAL, Bronchoscopy, on-pump perfusion, bilateral clamshell sternotomy;  Surgeon: Yanick Corral MD;  Location: [facility] OR     XR ACROMIOCLAVICULAR JOINT BILATERAL              Social History:     Social History     Tobacco Use     Smoking status: Never Smoker     Smokeless tobacco: Never Used   Substance Use Topics     Alcohol use: Never            Family History:   I have reviewed this patient's family history  Family History   Problem Relation Age of Onset     Diabetes Type 1 Mother      Heart Disease Mother      Chronic Obstructive Pulmonary Disease Mother      Rheumatoid Arthritis Father      Emphysema Paternal Grandfather             Immunizations:     There is no immunization history on file for this patient.         Allergies:   No Known Allergies          Medications:   Medications that Require Transfusion:     dextrose       - MEDICATION INSTRUCTIONS -       Warfarin Therapy Reminder       Scheduled Medications:     acetylcysteine  2 mL Nebulization TID     amLODIPine  5 mg Oral Daily      calcium carbonate 600 mg-vitamin D 400 units  1 tablet Oral or Feeding Tube BID w/meals     cefTAZidime  2 g Intravenous Q8H     escitalopram  20 mg Oral or Feeding Tube Daily     fluticasone  1 spray Both Nostrils Daily     folic acid-vit B6-vit B12  1 tablet Oral Daily     furosemide  40 mg Oral Daily     insulin aspart  1-7 Units Subcutaneous TID AC     insulin aspart  1-5 Units Subcutaneous At Bedtime     insulin aspart   Subcutaneous TID w/meals     insulin glargine  9 Units Subcutaneous QAM     [Held by provider] insulin NPH  24 Units Subcutaneous Q24H     levalbuterol  1.25 mg Nebulization TID     levothyroxine  25 mcg Oral Daily     magnesium oxide  800 mg Oral BID     multivitamin w/minerals  1 tablet Oral Daily     mycophenolate  1,000 mg Oral BID     nystatin  1,000,000 Units Swish & Swallow 4x Daily     pantoprazole  40 mg Oral BID AC     predniSONE  10 mg Oral or Feeding Tube BID     QUEtiapine  25 mg Oral At Bedtime     rosuvastatin  10 mg Oral or Feeding Tube Daily     sodium chloride (PF)  10-40 mL Intracatheter Q8H     sulfamethoxazole-trimethoprim  1 tablet Oral or Feeding Tube Daily     tacrolimus  2 mg Oral QPM     tacrolimus  2.5 mg Oral QAM     warfarin ANTICOAGULANT  2 mg Oral ONCE at 18:00               Physical Exam:   Temp: 97.2  F (36.2  C) Temp src: Oral BP: 127/81 Pulse: 107   Resp: 20 SpO2: 97 % O2 Device: None (Room air)      Wt Readings from Last 4 Encounters:   12/17/21 68.3 kg (150 lb 9.6 oz)   12/13/21 67.3 kg (148 lb 4.8 oz)     Constitutional: awake, alert, cooperative, no apparent distress and appears at stated age, well nourished.   No able to assess during a phone visit.          Data:   No results found for: ACD4    Inflammatory Markers    Recent Labs   Lab Test 10/29/21  0542 10/14/21  0943 10/12/21  1038 10/10/21  1024 10/08/21  0947 10/06/21  1007 10/04/21  0432 10/02/21  0528   SED 87*  --   --   --   --   --   --   --    CRP 53.0* 23.0* 17.0* 30.0* 34.0* 35.0* 23.0*  28.0*       Immune Globulin Studies     Recent Labs   Lab Test 12/15/21  0647 11/17/21  0337 10/15/21  0907 09/07/21  1324 09/07/21  1100   * 198* 265* 363*  363*  --    IGM  --   --   --  51  --    IGE  --   --   --   --  83   IGA  --   --   --  103  --        Metabolic Studies       Recent Labs   Lab Test 12/21/21  1203 12/21/21  0808 12/21/21  0210 12/20/21  2104 12/20/21  1707 12/20/21  1217 12/20/21  0824 12/20/21  0752 12/16/21  1912 12/16/21  1757 12/16/21  0831 12/16/21  0705 12/15/21  1704 12/15/21  1139 12/14/21  1142 12/14/21  1106 12/12/21  0933 12/12/21  0519 12/11/21  0819 12/11/21  0649 12/10/21  0823 12/10/21  0647 12/09/21  1227 12/09/21  0641 12/02/21  2030 12/02/21  1816 11/15/21  0749 11/15/21  0344 10/22/21  1602 10/22/21  1601 10/22/21  0959 10/22/21  0958   NA  --   --   --   --   --   --   --  142  --   --   --  137  --   --   --  135  --  141  --   --   --  138  --  138   < >  --    < > 140  140   < > 147*  --  147*   POTASSIUM  --   --   --   --   --   --   --  4.2  --   --   --  4.8  --  4.4  --  5.0  --  4.5  --  4.9  --  4.7  --  4.7   < >  --    < > 4.6  4.6   < > 3.6  --  3.5   CHLORIDE  --   --   --   --   --   --   --  108  --   --   --  106  --   --   --  101  --  108  --   --   --  103  --  105   < >  --    < > 106  106   < > 109  --  106   CO2  --   --   --   --   --   --   --  27  --   --   --  27  --   --   --  25  --  26  --   --   --  27  --  28   < >  --    < > 31  31   < > 31  --  34*   ANIONGAP  --   --   --   --   --   --   --  7  --   --   --  4  --   --   --  9  --  7  --   --   --  8  --  5   < >  --    < > 3  3   < > 7  --  7   BUN  --   --   --   --   --   --   --  30  --   --   --  29  --   --   --  33*  --  34*  --   --   --  30  --  28   < >  --    < > 37*  37*   < > 68*  --  62*   CR  --   --   --   --   --   --   --  0.82  --   --   --  0.74  --   --   --  0.82  --  1.00  --   --   --  0.69  --  0.64*   < >  --    < > 0.47*  0.47*   < > 1.47*  --   1.54*   GFRESTIMATED  --   --   --   --   --   --   --  >90  --   --   --  >90  --   --   --  >90  --  84  --   --   --  >90  --  >90   < >  --    < > >90  >90   < > 53*  --  50*   * 124* 133* 131* 138* 89   < > 129*   < >  --    < > 90   < >  --    < > 115*   < > 167*   < >  --    < > 203*   < > 153*   < >  --    < > 191*  191*   < > 148*   < > 92   A1C  --   --   --   --   --   --   --   --   --   --   --   --   --   --   --   --   --   --   --   --   --   --   --   --   --   --   --  5.3  --   --   --   --    ERIK  --   --   --   --   --   --   --  9.2  --   --   --  9.5  --   --   --  9.9  --  8.6  --   --   --  9.0  --  9.1   < >  --    < > 8.7  8.7   < > 8.2*  --  7.9*   PHOS  --   --   --   --   --   --   --   --   --   --   --   --   --   --   --   --   --  3.7  --  3.4  --  3.6  --   --    < >  --    < > 3.6   < > 2.8  --   --    MAG  --   --   --   --   --   --   --  1.5*  --   --   --   --   --   --   --   --   --  1.5*  --   --   --  1.6  --   --    < >  --    < > 1.5*   < > 2.1  --   --    LACT  --   --   --   --   --   --   --   --   --  1.6  --   --   --   --   --   --   --   --   --   --   --   --   --   --   --  1.7   < >  --    < >  --    < >  --    CKT  --   --   --   --   --   --   --   --   --   --   --   --   --   --   --   --   --   --   --   --   --   --   --   --   --   --   --   --   --  51  --  55    < > = values in this interval not displayed.       Hepatic Studies    Recent Labs   Lab Test 12/12/21  0519 12/10/21  0647 12/08/21  0604 12/07/21  0605 12/06/21  0554 12/05/21  0637 09/30/21  1059 09/19/21  1629   BILITOTAL 0.2 0.5 0.2 <0.1* 0.2 0.2   < >  --    ALKPHOS 104 105 107 106 114 109   < >  --    ALBUMIN 2.5* 2.5* 2.3* 2.3* 2.4* 2.3*   < >  --    AST 17 19 17 22 26 21   < >  --    ALT 26 24 25 27 32 27   < >  --    LDH  --   --   --   --   --   --   --  423*    < > = values in this interval not displayed.       Pancreatitis testing    Recent Labs   Lab Test 10/22/21  0407  10/21/21  0354 10/20/21  0308 10/19/21  0338 09/07/21  1324 09/07/21  1100   AMYLASE  --   --   --   --   --  32   TRIG 307* 486* 383* 458* 364*  --        Hematology Studies      Recent Labs   Lab Test 12/20/21  0752 12/17/21  0759 12/16/21  1611 12/15/21  0647 12/14/21  1106 12/12/21  0519 11/02/21  1302 11/02/21  1053 11/02/21  0832 11/02/21  0815 11/02/21  0556 11/01/21  0818 10/25/21  0326 10/24/21  0410 10/23/21  0344   WBC 9.3 11.6* 15.8* 11.0 13.2* 9.7   < > 57.4*  57.4*   < > 37.5* 32.6* 28.4*   < > 21.4* 21.3*   ANEU  --   --   --   --   --   --   --  49.9*  --  28.5* 29.7* 25.3*  --  19.9* 20.0*   ALYM  --   --   --   --   --   --   --  4.0  --  4.9 1.3 1.1  --  0.4* 0.2*   DONALD  --   --   --   --   --   --   --  1.7*  --  2.3* 0.7 1.1  --  0.9 1.1   AEOS  --   --   --   --   --   --   --  0.0  --  0.0 0.0 0.0  --  0.0 0.0   HGB 9.5* 9.9* 10.6* 10.2* 11.5* 9.8*   < > 8.8*   < > 5.8* 8.2* 9.6*   < > 9.8* 9.6*   HCT 31.4* 31.2* 33.7* 31.7* 36.2* 30.6*   < > 26.8*   < > 19.6* 27.3* 31.4*   < > 30.5* 30.2*    171 199 193 218 144*   < > 244   < > 382 355 308   < > 201 132*    < > = values in this interval not displayed.       Clotting Studies    Recent Labs   Lab Test 12/21/21  0659 12/20/21  0752 12/19/21  0622 12/18/21  0635 11/03/21  0407 11/02/21  1053 11/02/21  0927 11/02/21  0908 11/02/21  0832 10/23/21  0344 10/22/21  1407   INR 2.30* 2.22* 2.54* 2.66*   < > 1.55*  --    < > 1.38*   < > 1.28*   PTT  --   --   --   --   --  34 32  --  50*  --  43*    < > = values in this interval not displayed.       Arterial Blood Gas Testing    Recent Labs   Lab Test 12/01/21  0725 11/22/21  0426 11/22/21  0103 11/21/21  1029 11/14/21  0534 11/11/21  1740 11/07/21  0626 11/06/21  0438 11/05/21  0504 11/05/21  0336 11/04/21  1016   PH  --   --   --   --   --  7.48*  --  7.43 7.49* 7.49* 7.46*   PCO2  --   --   --   --   --  37  --  37 31* 32* 35   PO2  --   --   --   --   --  106*  --  109* 126* 331* 95   HCO3   --   --   --   --   --  28  --  25 24 24 25   O2PER 2 40 40 40   < > 40   < > 40 40 40 40    < > = values in this interval not displayed.        Urine Studies     Recent Labs   Lab Test 11/01/21  1336 10/25/21  1507 10/22/21  1641 10/17/21  1642 10/17/21  1041   URINEPH 5.5 5.5 7.5* 5.0 5.0   NITRITE Negative Negative Negative Negative Negative   LEUKEST Negative Negative Negative Negative Trace*   WBCU 0 2 3 25* 44*       Vancomycin Levels     Recent Labs   Lab Test 10/18/21  0403   VANCOMYCIN 12.6       Tobramycin levels     No lab results found.    Gentamicin levels    No lab results found.    Tacrolimus levels    Invalid input(s): TACROLIMUS, TAC, TACR  Transplant Immunosuppression Labs Latest Ref Rng & Units 12/20/2021 12/17/2021 12/16/2021 12/15/2021 12/14/2021   Creat 0.66 - 1.25 mg/dL 0.82 - 0.74 - 0.82   BUN 7 - 30 mg/dL 30 - 29 - 33(H)   WBC 4.0 - 11.0 10e3/uL 9.3 11.6(H) 15.8(H) 11.0 13.2(H)   Neutrophil % 79 81 88 82 87   ANEU 1.6 - 8.3 10e3/uL - - - - -       Cyclosporine levels    Invalid input(s): CYCLOSPORINE, CYC    Mycophenolate levels    Invalid input(s): MYPA, MYP    Sirolimus levels    Invalid input(s): SIROLIMUS, SIR, RAPA    CSF testing     Recent Labs   Lab Test 10/29/21  1221   CWBC 80*   CNEU 94   CLYM 3   CMONO 4   CRBC 9,000*   CGLU 83*   CTP 97*         Microbiology:  Blood cx negative.   Last check of C difficile  C Difficile Toxin B by PCR   Date Value Ref Range Status   11/20/2021 Negative Negative Final     Comment:     A negative result does not exclude actual disease due to C. difficile and may be due to improper collection, handling and storage of the specimen or the number of organisms in the specimen is below the detection limit of the assay.       Virology:  CMV viral loads    CMV DNA IU/mL   Date Value Ref Range Status   12/15/2021 Not Detected Not Detected IU/mL Final     CMV viral loads    Recent Labs   Lab Test 12/15/21  0647   CMVQNT Not Detected       CMV viral loads     CMV DNA IU/mL   Date Value Ref Range Status   12/15/2021 Not Detected Not Detected IU/mL Final   11/22/2021 Not Detected Not Detected IU/mL Final   11/16/2021 Not Detected Not Detected IU/mL Final   11/12/2021 Not Detected Not Detected IU/mL Final   10/26/2021 Not Detected Not Detected IU/mL Final       CMV resistance testing  No lab results found.  No results found for: CMVCID, CMVFOS, CMVGAN     No results found for: H6RES    EBV DNA Copies/mL   Date Value Ref Range Status   12/15/2021 Not Detected Not Detected copies/mL Final   11/16/2021 Not Detected Not Detected copies/mL Final       CMV Antibody IgG   Date Value Ref Range Status   10/15/2021 No detectable antibody. No detectable antibody.  Final   09/07/2021 No detectable antibody. No detectable antibody.  Final       Toxoplasma Antibody IgG   Date Value Ref Range Status   09/07/2021 <3.0 0.0 - 7.1 IU/mL Final     Comment:     Negative- Absence of detectable Toxoplasma gondii IgG antibodies. A negative result does not rule out acute infection.       Pathology   Native explanted lungs 10/16/2021  A.  LUNG, LEFT NATIVE, PNEUMONECTOMY:  -Nonspecific interstitial pneumonitis (NSIP), predominant fibrotic pattern, with superimposed organizing pneumonia, reactive pneumocyte type II hyperplasia, intra-alveolar clusters of pigmented macrophages, and areas of end-stage fibrosis.  -Three benign hilar lymph nodes.  B.  LUNG, RIGHT NATIVE, PNEUMONECTOMY:  -Nonspecific interstitial pneumonitis (NSIP), predominant fibrotic pattern, with superimposed organizing pneumonia, reactive pneumocyte type II hyperplasia, intra-alveolar clusters of pigmented macrophages, and areas of end-stage fibrosis.  -Four benign hilar lymph nodes.  C.  Lymph node, level 5, excision:  -Fragments of lymph node, negative for malignancy.        Imaging:  CT chest 12/2/2021 reviewed by myself.   IMPRESSION:   1. Overall decreased (since 11/22/2021 chest CT) size and number of  bilateral  infectious/inflammatory groundglass opacities.  2. Decreased but persistent left lower lobe cavitary lesion.  3. Decreased bilateral pleural loculated effusions.  CT chest 11/22/21   IMPRESSION:   1. New cavitary lesion in the left lung base with multifocal  groundglass opacities some of which are new compared to CT 11/2/2021,  notably in the bilateral upper lobes concerning for ongoing infection.  2. Decreased bilateral loculated pleural effusions.  3. New 1.8 cm fluid collection with surrounding fat stranding in the  left axilla.  4. Similar small pericardial effusion.        Ming Abebe MD  [facility]  Contact information available via [facility] Paging/Directory     12/21/2021

## 2021-12-21 NOTE — PLAN OF CARE
FOCUS/GOAL  Bowel management, Bladder management, Pain management, Mobility, Skin integrity, and Safety management    ASSESSMENT, INTERVENTIONS AND CONTINUING PLAN FOR GOAL:  A/O, VS stable. Regular/thin, pills whole with water. Ate most of meal, no insulin needed for blood sugar, only gave correction for carbs eaten. Continent of bowel and bladder using the urinal and toilet. Last BM yesterday. Pain in coccyx, relieved with position change out of wheelchair to bed. SBA with walker to transfer, short of breath with transferring. No new skin concerns. Calling appropriately with light, alarms in use, call light in reach. Continue POC.

## 2021-12-21 NOTE — PLAN OF CARE
FOCUS/GOAL  Medical management    ASSESSMENT, INTERVENTIONS AND CONTINUING PLAN FOR GOAL:  Patient alert and oriented, able to make needs known.  Slept most of the night, awaken in between medication administration and toilet needs. No pain /headache/chest pain complained, breathing normal. Patient is continent of Bladder, used urinal independently, emptied by staff. PICC line to LUE patent and intact, received IV antibiotics every 8hours, flushed IV line as ordered. Patient prefers not to be disturbed at night, frequent rounding implemented for safety, minimized noise so not to disturb his sleep. Call light in reach, bed alarm ON, no concern at this time, may continue with POC.

## 2021-12-21 NOTE — PLAN OF CARE
Triple lumen PICC flushed & patent.  IV Diflucan & Fortaz administered via PICC w/no issues.  Pt has poor appetite. Did not eat breakfast.  BG at noon was 168. Pt forgot to call before drinking nepro at 11am. He stated he is used to calling for food but it slipped his mind with the nepro since it is a liquid.  Fiance at bedside most of shift.  Reports usually eating one meal a day at home in the evening. He likes ice cream & would like a magic cup supplementation if possible, with preference of chocolate then vanilla. RD paged & Dr Torres for orders. No new orders at this time.

## 2021-12-21 NOTE — PLAN OF CARE
Discharge Planner Post-Acute Rehab SLP:      Discharge Plan: [facility] with jarret , further SLP TBD     Precautions: aspiration precautions, sternal, fall     Current Status:  Communication: WFL.   Cognition: Mild-moderate cognitive impairments with deficits re: attention, exeuctive function, memory, and visuospatial skills, word finding.  Swallow: Regular, thin liquid. Medications to be given orally with thin liquids. Will continue to monitor.      Assessment:  SLP: worked on numerical reasoning word problems dealing wth time, needed assist 30% increased processing time. -10 minutes 2/2 RT    Barriers to discharge:

## 2021-12-21 NOTE — PROGRESS NOTES
Calorie Count:  12/20: 1799 kcal and 65 g protein (3 meals - breakfast from , lunch was just brook crackers and juice per RN notes, and dinner outside meal of taco roman)    Zenaida Washington RD, CNSC, LD  ARU RD pager: [phone]

## 2021-12-21 NOTE — PROGRESS NOTES
ONEAL received a vm from Insurance  Jenn PH: [phone], ext [phone]. Jenn requesting update on discharge. ONEAL called Jenn back, left a vm and provided her with anticipated discharge date, information that pt stay may be extended and the direct number for RN CC who is assisting with coordinating discharge. No additional SW needs at this time.     [facility]   [facility]   Direct Phone: [phone]  I   Pager: [phone]  I  Fax: [phone]

## 2021-12-21 NOTE — PLAN OF CARE
"Discharge Planner Post-Acute Rehab PT:      Discharge Plan: [facility] with RUDY wheat, then home to [place], [place]     Precautions: Falls, dyspnea on exertion     Current Status:  Bed Mobility: Min A  Transfer: CGA with WW  Gait: CGA x 45ft with WW and w/c follow  Stairs: 5 step ups with min A  Balance: Multiple LOB during session and rapid sitting, recommending CGA at all times  30 seconds STS from 20\" mat using hands on FWW 12/19/21 = 6, OMNI 3/10  TUG 12/19/21, CGA with FWW = 20 seconds, OMNI 7/10     Assessment:  Although pt started session off with rushed transfers and not fully turning to surface before sitting, writer did notice improved safety during functional transfer drill reps. Fatigues quickly with MYERS, needs frequent and prolonged rest breaks.        Other Barriers to Discharge (DME, Family Training, etc): Significant deconditioning  "

## 2021-12-21 NOTE — PROGRESS NOTES
[facility]    Progress Note - TCU Service        Date of Admission:  12/13/2021    Assessment & Plan             Mr. 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     Today's changes: 12/21/21  No new issues.   He is ambulating.   Pt's wife at bedside.   Case discussed with PM&R team.       # ILD and NSIP s/p BSLT on 10/16/2021  # Acute on chronic hypoxic respiratory failure s/p tracheostomy on 10/29/21 and decannulation on 12/8/2021  # Bilateral pleural effusions, improved.  - Transplant pulmonology following. IS dosing per Pulmonary team.    Prophylaxis:   - Bactrim for PJP ppx (held 11/2-11/5 d/t BRENNAN)  - Nystatin for oral candidiasis ppx, 6 month course  - Continue monitoring Monthly EBV, CMV     -Immunosuppression: s/p induction therapy with basiliximab 10/16 (and high dose IV steroid) and 10/20 Continue tacrolimus 2.5 mg qAM / 2 mg qPM (decreased 12/11).  Goal level 8-12. 12/16: 8.6.  SST4690 mg BID (decreased 11/2 given GI bleed, AZA to be avoided given TPMT) , prednisone 10 mg BID, next taper due 1/2/22   - qM/Th CBC/BMP tacrolimus levels    Positive cross match:   Positive DSA: Note that he received two doses of rituximab in June, which is likely contributing to cross match result.  DSA newly positive 11/29 with DQB5 (mfi 2522), decreased (mfi 1873) on 12/6. DSA on 12/12, decreased further to 1703.   - Repeat DSA (12/26) every two weeks     Others:   - Levalbuterol and mucomyst TID and pulm toilet and chest PT BID  - PT/OT/SLP -@ PM&R team.   - Continue 40 mg of Lasix  daily  -  Oxycodone discontinued   - Acetaminophen PRN 975mg tid    # Left lung cavitary lesion   # Klebsiella pneumoniae and Pseudomonas fluorescens/putida HAP  aspiration vs pseudomonal vs Klebsiella pneumonia related abscess. Indeterminate Quant Gold, but negative tuberculin skin tests on mulitple occurences. S/p BAL on 11/22.Cocci antigen in urine negative, serum, BAL histoplasmosis negative, CRAG negative, respiratory viral panel (-), TB (-), candida growth from bal culture. Transplant ID consulted.      - IV Ceftazdime 2 grams Q8H (until 12/21)  - New CT chest - Overall decreased (since 11/22/2021 chest CT) size and number of bilateral infectious/inflammatory groundglass opacities  - Follow up with Dr. Abeeb on 12/21/2021 @6:30 PM in a virtual visit.     # Hypogammaglobinemia:   IgG previously low at 364 (9/7).  Noted at 265 at time of transplant, s/p IVIG 10/21, repeat IgG (11/17) 198, s/p IVIG (with premedication) 11/18. Repeat IVIG on 12/15 of 365;   Per Pulm: IVIG indicated but ARU  unable to administer IVIG and patient is unable to leave to receive it in an infusion center.  - Repeat IgG ~ 12/30  - Further per Transplant team.     # Encephalopathy, resolved  # Difficulty sleeping  Likely multi-factorial in the setting of stroke, prolonged critical illness.  - Ct Seroquel 25 mg TID prn and 50 mg at bedtime. Wean down as tolerated.   - Melatonin 10 mg at bedtime  - Trazodone 25 mg prn.      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 MRI brain on 10/23 with multifocal subacute infarct within both cerebral hemispheres and left cerebellum. Presumed embolic with afib hx, identified while inpatient. He is currently able to ambulate with relative ease.     - Continue warfarin per pharmacy protocol      # Right subclavian DVT   Diagnosed on 11/4 on ultrasound.  - Continue warfarin per pharmacy protocol  - INR therapeutic.       # DM2 with steroid induced hyperglycemia  Hemoglobin A1c 6.6 pre-operatively. Endocrine recently consulted for steroids induced hyperglycemia.      -Continue glargine 9IU qAM  -Continue NPH 45 IU at bedtime when on Tube feeds  -Has not required correction insulin over the past 3 days    # Severe malnutrition in the context of acute on chronic illness  Patient currently has a PEG J-tube and is getting the Jorde of nutrition through cycle tube feeds.  - Multivitamin, folic acid, B6, B12 supplements   - TF per nutrition; continue oral diet as well     # Anxiety and depression   Psychiatry reconsulted and after discussion suggest increasing lexapro. Will discuss with patient regarding starting ritalin in the AM for motivation.  -Ct Lexapro 20 mg daily (increased dose)  -As needed hydroxyzine.     # Concern for chipped tooth  # Poor dental hygiene  Patient noted he feels like he may have chipped his tooth or has a loose tooth after eating guevara. It is not painful.   - Dental hygiene consult placed and chipped tooth stable_ rec oral hygiene cares.     # Rheumatoid arthritis- Dx 5/2021 with + CCP antibody and RF. Previously treated with rituximab. Rheum consulted early in admission. On steroids as noted above.      # SALTY - Noted pre-transplant. Home CPAP 6-12 cm H2O.  - Evaluate need for BiPAP after trach site has healed     # HTN- Continue PTA amlodipine.   Stable.      # Hypothyroidism - TSH 3.17 on 11/19/21.   -Continue PTA levothyroxine 25 mcg daily.     # Recurrent GIB - hgb drop on 10/22 s/p 2 unit pRBC transfusion with EGD on 10/23 with NJ/OG tube trauma with scant oozing. Patient then developed progressive hypotension and ultimately CODE BLUE for GIB in setting of anticoagulation with heparin drip, s/p massive transfusion protocol. EGD on 11/2 with large amount of clotted blood in stomach and area of raised mucosa with small adherent clot near PEG tube site that was clipped, no active bleeding.  Maroon stools  11/3, repeat EGD with extensive old blood in stomach, no active bleeding, small nodular area with prior clips clipped again.  Most recent EGD 11/5 with ulcer noted at PEG tube bumper site, gastritis, and suction marks from G tube. TF noted in G tube drainage bag 11/7, AXR with GJ tube tip projecting over proximal duodenum. GJ tube exchanged 11/9 by GI.     - PO PPI BID    .   # Hypomagnesemia: Suppressed reabsorption 2/2 CNI.  Requiring intermittent IV and PO replacement.  - Mag-Ox 800 mg qAM / 400 mg qPM (increased 12/8)     # Tremors: Potentially tacrolimus related. Ct to monitor.   Improving.      Diet: Room Service  Regular Diet Adult Thin Liquids (level 0)  Calorie Counts  Adult Formula Drip Feeding: Continuous Nepro with Carbsteady; Jejunostomy; Goal Rate: Hold starting today (12/20); mL/hr; Medication - Feeding Tube Flush Frequency: At least 15-30 mL water before and after medication administration and with tube c...  Snacks/Supplements Adult: Nepro Oral Supplement; With Meals    DVT Prophylaxis: Warfarin dosed by pharmacy  Watson Catheter: Not present  Fluids: None  Central Lines: None  Code Status: Full Code      Disposition Plan   Expected Discharge: 12/30/2021     Anticipated discharge location:  Awaiting care coordination huddle         The patient's care was discussed with the Attending Physician, Dr. Valdez.    Conrad Martin MD  TCU Service  [facility]  Securely message with the judo Web Console (learn more here)  Text page via [facility] Paging/Directory        ______________________________________________________________________    Interval History   No acute events overnight.   He was pleasant.   No new issues.   Denies chest pain, palpitations, shortness of breath, nausea, vomit, fever or chills.     Data reviewed today: I reviewed all medications, new labs and imaging results over the last 24 hours. I personally reviewed no images or EKG's  today.    Physical Exam   Vital Signs: Temp: 96.9  F (36.1  C) Temp src: Oral BP: 111/64 Pulse: 85   Resp: 20 SpO2: 94 % O2 Device: None (Room air)    Weight: 150 lbs 9.6 oz  General Appearance: Alert, room air, no acute distress.   Respiratory: CTAB, no crackes, rales or wheezing  Cardiovascular: S1, S2, tachycardic, no m/r/g  GI: BS(+), soft, non-tender, non-distended, PEG tube in place  Skin: No MEMO  Other: AAOx3, moving all extremities.     Data   Recent Labs   Lab 12/21/21  0808 12/21/21  0659 12/21/21  0210 12/20/21  2104 12/20/21  0824 12/20/21  0752 12/19/21  0743 12/19/21  0622 12/17/21  0819 12/17/21  0759 12/16/21  1655 12/16/21  1611 12/16/21  0707 12/16/21  0705 12/15/21  1704 12/15/21  1139   WBC  --   --   --   --   --  9.3  --   --   --  11.6*  --  15.8*  --   --   --   --    HGB  --   --   --   --   --  9.5*  --   --   --  9.9*  --  10.6*  --   --   --   --    MCV  --   --   --   --   --  91  --   --   --  90  --  89  --   --   --   --    PLT  --   --   --   --   --  165  --   --   --  171  --  199  --   --   --   --    INR  --  2.30*  --   --   --  2.22*  --  2.54*   < > 2.77*  --   --    < >  --   --   --    NA  --   --   --   --   --  142  --   --   --   --   --   --   --  137  --   --    POTASSIUM  --   --   --   --   --  4.2  --   --   --   --   --   --   --  4.8  --  4.4   CHLORIDE  --   --   --   --   --  108  --   --   --   --   --   --   --  106  --   --    CO2  --   --   --   --   --  27  --   --   --   --   --   --   --  27  --   --    BUN  --   --   --   --   --  30  --   --   --   --   --   --   --  29  --   --    CR  --   --   --   --   --  0.82  --   --   --   --   --   --   --  0.74  --   --    ANIONGAP  --   --   --   --   --  7  --   --   --   --   --   --   --  4  --   --    ERIK  --   --   --   --   --  9.2  --   --   --   --   --   --   --  9.5  --   --    *  --  133* 131*   < > 129*   < >  --    < >  --    < >  --    < > 90   < >  --     < > = values in this interval not  displayed.

## 2021-12-21 NOTE — PROGRESS NOTES
"  General acute hospital   Acute Rehabilitation Unit  Daily progress note    INTERVAL HISTORY  Seen sitting up in chair working with OT on sit to stand transfers, per patient and OT strength, activity tolerance, and breathing/ anxiety associated with activity has improved.  Denies dizziness, n/v/d, breathing at rest without difficulty.  Says he has not appetite but this is a chronic issue for him, \" I have never felt hungry my whole life\".  Instead says he eats \"because it's time and I have to\". He is trying to increase intake because would like to be without tube feeding.      We discussed CT chest and plan for follow up with infectious disease today.  Coordinating consult visit with transplant ID.       SLP: Assessment:  SLP: worked on numerical reasoning word problems dealing wth time, needed assist 30% increased processing time.    OT: Working on safety with short distance amb and transfers. Pt improving when he doesn't rush and when he moves at slow pace. Pt also walked from the therapy gym to his room with CGA and use of walker. WC follow.     MEDICATIONS  Scheduled meds    acetylcysteine  2 mL Nebulization TID     amLODIPine  5 mg Oral Daily     calcium carbonate 600 mg-vitamin D 400 units  1 tablet Oral or Feeding Tube BID w/meals     cefTAZidime  2 g Intravenous Q8H     escitalopram  20 mg Oral or Feeding Tube Daily     fluticasone  1 spray Both Nostrils Daily     folic acid-vit B6-vit B12  1 tablet Oral Daily     furosemide  40 mg Oral Daily     insulin aspart  1-7 Units Subcutaneous TID AC     insulin aspart  1-5 Units Subcutaneous At Bedtime     insulin aspart   Subcutaneous TID w/meals     insulin glargine  9 Units Subcutaneous QAM     [Held by provider] insulin NPH  24 Units Subcutaneous Q24H     levalbuterol  1.25 mg Nebulization TID     levothyroxine  25 mcg Oral Daily     magnesium oxide  800 mg Oral BID     multivitamin w/minerals  1 tablet Oral Daily     mycophenolate  " "1,000 mg Oral BID     nystatin  1,000,000 Units Swish & Swallow 4x Daily     pantoprazole  40 mg Oral BID AC     predniSONE  10 mg Oral or Feeding Tube BID     QUEtiapine  25 mg Oral At Bedtime     rosuvastatin  10 mg Oral or Feeding Tube Daily     sodium chloride (PF)  10-40 mL Intracatheter Q8H     sulfamethoxazole-trimethoprim  1 tablet Oral or Feeding Tube Daily     tacrolimus  2 mg Oral QPM     tacrolimus  2.5 mg Oral QAM     warfarin ANTICOAGULANT  2 mg Oral ONCE at 18:00       PRN meds:  acetaminophen, albuterol, bisacodyl, dextrose, glucose **OR** dextrose **OR** glucagon, hydrOXYzine, insulin aspart, lidocaine (viscous), magnesium hydroxide, melatonin, naloxone **OR** naloxone **OR** naloxone **OR** naloxone, ondansetron, - MEDICATION INSTRUCTIONS -, prochlorperazine, QUEtiapine, senna-docusate, sodium chloride (PF), traZODone, Warfarin Therapy Reminder, zinc-white petrolatum      PHYSICAL EXAM  /64 (BP Location: Right arm)   Pulse 85   Temp 96.9  F (36.1  C) (Oral)   Resp 20   Ht 1.646 m (5' 4.8\")   Wt 68.3 kg (150 lb 9.6 oz)   SpO2 94%   BMI 25.22 kg/m    Gen: Awake, coooperative NAD  HEENT: MMM eomi  Cardio: rrr  Pulm: Non-labored breathing at rest, with lungs clear diminished at bases.  Abd: Soft, non-tender to palpation, bowel sounds present. PEG in place   Ext: No edema in lower or upper extremities b/l, no calf tenderness  Neuro/MSK: alert, oriented, answers questions appropriately, follows commands. +tremor present.      LABS  Recent Labs   Lab 12/21/21  1203 12/21/21  0808 12/21/21  0659 12/21/21  0210 12/20/21  0824 12/20/21  0752 12/19/21  0743 12/19/21  0622 12/17/21  0819 12/17/21  0759 12/16/21  1655 12/16/21  1611 12/16/21  0707 12/16/21  0705 12/15/21  1704 12/15/21  1139   WBC  --   --   --   --   --  9.3  --   --   --  11.6*  --  15.8*  --   --   --   --    HGB  --   --   --   --   --  9.5*  --   --   --  9.9*  --  10.6*  --   --   --   --    MCV  --   --   --   --   --  91  " --   --   --  90  --  89  --   --   --   --    PLT  --   --   --   --   --  165  --   --   --  171  --  199  --   --   --   --    INR  --   --  2.30*  --   --  2.22*  --  2.54*   < > 2.77*  --   --    < >  --   --   --    NA  --   --   --   --   --  142  --   --   --   --   --   --   --  137  --   --    POTASSIUM  --   --   --   --   --  4.2  --   --   --   --   --   --   --  4.8  --  4.4   CHLORIDE  --   --   --   --   --  108  --   --   --   --   --   --   --  106  --   --    CO2  --   --   --   --   --  27  --   --   --   --   --   --   --  27  --   --    BUN  --   --   --   --   --  30  --   --   --   --   --   --   --  29  --   --    CR  --   --   --   --   --  0.82  --   --   --   --   --   --   --  0.74  --   --    ANIONGAP  --   --   --   --   --  7  --   --   --   --   --   --   --  4  --   --    ERIK  --   --   --   --   --  9.2  --   --   --   --   --   --   --  9.5  --   --    * 124*  --  133*   < > 129*   < >  --    < >  --    < >  --    < > 90   < >  --     < > = values in this interval not displayed.     Recent Results (from the past 24 hour(s))   CT Chest w Contrast    Narrative    EXAM: CT CHEST W CONTRAST, 12/20/2021    INDICATION: History of empyema, lll cavitary lesion..    COMPARISON: CT of the chest dated 11/22/2021, same day radiograph of  the chest at 926.    TECHNIQUE: CT imaging obtained through the chest with 74 mL Isovue 370  intravenous contrast. Coronal and axial MIP reformatted images  obtained.     FINDINGS:    Lungs:  Central tracheobronchial tree is patent. Surgical changes of bilateral  lung transplantation with clamshell sternotomy wires. Prior  tracheostomy tract is patent. Waxing and waning but overall decreased  size and number of groundglass pulmonary opacities, now greatest in  the right upper lobe. The left lower lobe cavitary lesion is smaller  measuring 25 x 24 mm, previously 32 x 29 mm. Decreased bilateral  moderate loculated pleural effusions. Scattered areas of  peripheral  linear atelectasis versus scarring. No pneumothoraces    Chest:   Heart is normal size without pericardial effusion.  Mild coronary  artery calcifications.  Non-aneurysmal thoracic aorta. No central  pulmonary embolism. No pathologically enlarged thoracic lymph nodes.  Left-sided PICC with tip in the upper SVC.    Upper Abdomen:   Gastrojejunostomy tube in place with balloon in the gastric lumen.    Bones / Soft Tissues:   No suspicious lesions. Chronic and healing anterolateral rib  fractures. Continued bony fragmentation but improved alignment at the  clamshell sternotomy site.      Impression    IMPRESSION:   1. Overall decreased (since 11/22/2021 chest CT) size and number of  bilateral infectious/inflammatory groundglass opacities.  2. Decreased but persistent left lower lobe cavitary lesion.  3. Decreased bilateral pleural loculated effusions.    I have personally reviewed the examination and initial interpretation  and I agree with the findings.    ANUPAM GASTON DO         SYSTEM ID:  [id number]       ASSESSMENT AND PLAN    Edson Thornton is a 56 year old right hand dominant male with a hx of NSIP/ILD associated with rheumatoid lung disease, hypertension, hyperlipidemia, RA who initially presented 9/5/2021 with acute on chronic respiratory failure for transplant work-up s/p bilateral lung transplant (10/16/21) with prolonged hospitalization associated with multifocal bilateral acute/subacute ischemic infarcts of bilateral frontal, occipital lobes.  Postoperative course complicated by prolonged ventilator wean s/p trach and PEG placement (10/29), encephalopathy, new Afib with RVR, BRENNAN, candidemia/Candida empyema, and brief bradycardic arrest with subsequent hypotension in the setting of GI bleed.  [facility] and was decannulated 12/8/2021.  He is currently stable on room air at admission to acute rehab.  He presents with right upper extremity weakness,  bilateral dysmetria/tremors, acute on chronic severe deconditioning, dyspnea on exertion, and impairment of ADLs and gait.     Admission to acute inpatient rehab 12/13/2021  .    Impairment group code: Stroke Ischemic 01.3 Bilateral Involvement: embolic CVA affecting B cerebral hemispheres and L cerebellum, as well as s/p bilateral lung transplantation     1. PT, OT 90 minutes of each on a daily basis, in addition to rehab nursing and close management of physiatrist.   2. Impairment of ADLs/IADLs:  OT for 60 min daily to work on upper and lower body self care, dressing, toileting, bathing, energy conservation techniques with use of ADs as needed.   3. Impairment of mobility:   PT for 60 min daily to work on gait exercises, strengthening, endurance buildup, transfers with use of walker as needed.   4. Impairment of cognition:  SLP for 60 minutes for cognitive evaluation and treatment strategies for higher level cognitive deficits, and memory impairment.  5. Rehab RN to administer medication, patient education on medication taking, VS monitoring, bowel regimen, glucose monitoring and wound care/surgical wound dressing changes and monitoring.   6. Medical Conditions - Hospital ist team consulted, appreciate assistance     Neuro/Psych  #Multifocal acute to subacute ischemic stroke, etiology likely embolic vs perioperative  Initially noted 10/22 and 11/6 with stroke code called for change in exam.  MRI brain 10/23 with infarcts noted in both cerebral hemispheres (R frontal, L corona radiata, bilateral occipital), left cerebellum, presumed associated with new Afib vs perioperative. Bilateral upper extremity paresis (R>L), suspicion for some cortical blindness  - Stroke risk factor management:              -Warfarin anticoagulation for Afib,appreciate pharmacy assistance with dosing              -BP goals: SBP<140              -continue Rosuvastatin 10 mg qday              -Not smoking              -Hgb A1c:  6.6%     #Pain  -Acetaminophen 975mg q8h PRN  -Lidocaine patch  -Menthol patch     #Encephalopathy  #Sleep  -Delirium precautions  -Seroquel 25 mg at bedtime (reduced 12/20)- continue to wean off.   -Melatonin 10mg qhs  -Trazodone 25mg prn     #Mood  #History of anxiety and depression  - PTA Lexapro increased to 10 mg while in acute hospital, increased to 20mg 12/14  -Hydroxyzine as needed         Pulmonary  #ILD, NISP, s/p bilateral lung transplant (10/16/2021)  #Acute on chronic respiratory failure s/p tracheostomy (10/29/21) and decannulation (12/8/21)  #Bilateral Pleural Effusions  -Sternal precautions until 12 wks post op (1/8/2022)  -Immunosuppression:              -Continue tacrolimus  goal 8-12              -Continue MMF 1000mg BID              -Continue prednisone 10mg BID  -Monitoring lab/imaging              -DSA q2wks              -Monthly Coccidioides Ag per transplant completed 12/17              -CMV, EBV per transplant team              -CXR, CBC, BMP, Tac level M/Th  -Continue Levalbuterol and mucomyst nebs QID  -Aggressive pulmonary toilet and chest PT QID  -Continue Lasix 40mg qday     #SALTY  -PTA CPAP- once trach site fully healed      ID  #Immunosuppression Prophylaxsis   -Bactrim x6 months  -Nystatin x6 months     #L lung cavitary lesion  #Hospital-acquired pneumonia with Klebsiella pneumoniae, Pseudomonas fluorescens/putida  Suspected aspiration versus pseudomonal vs Klebsiella pneumonia related to abscess.  CT  11/22 showing new cavitary lesion in left lung base, multifocal bilateral upper lobe groundglass opacity, and new 1.8 cm fluid collection with surrounding fat stranding in left axilla, decreased bilateral loculated pleural effusions.  QuantiFERON gold indeterminate, negative tuberculin skin test on multiple occurrences.  Underwent BAL on 11/22 with cultures growing Klebsiella and Pseudomonas fluorescens/putida, resistant to meropenem.  ID was consulted and feels this is likely fungal.   B-D glucan positive with known candidemia.  Cocci antigen in urine negative, serum histoplasmosis negative, CRAG negative  -IV ceftazidime 2g q8hrs (end 12/21)  -Follow-up chest CT 12/20   1. Overall decreased (since 11/22/2021 chest CT) size and number of bilateral infectious/inflammatory groundglass opacities. 2. Decreased but persistent left lower lobe cavitary lesion.  3. Decreased bilateral pleural loculated effusions.plan for follow up consult per Dr. Abebe 12/21/21.      #Invasive Candida albicans candidiasis  Positive Candida blood cultures 10/20, 10/22.  Fungitell positive (399).  TC 10/23 without evidence of endocarditis. Ophthalmic consulted 10/24, benign funduscopic exam.  Candida empyema 10/25.  Repeated pleural effusion fungal cultures and fungal/bacterial blood cultures have been without growth since 11/18.  Initially on micafungin (10/22-10/27) before transition to fluconazole IV  -Continue IV Fluconazole 400mg daily (10/26-12/21)- appreciate recs per Dr. Abebe 12/21/21.     #hx of HSV  Chronic intermittent infection pretransplant, with recent HSV infection while in acute hospital (crusted lesions along left jaw) s/p 10d treatment until 10/9.     Cardiovascular  #Afib  Initially noted 10/18, converted to NSR with amio ggt. Metoprolol and amiodarone discontinued due to bradycardia. INR: 2.3 12/21  -Warfarin as dosed by pharmacy     #Hypertension  -continue PTA amlodipine 5 mg daily  -also on lasix 40 mg daily      Heme  #Leukocytosis  New leukocytosis to 13.2 on 12/14 -Now returned to normal limits at 9.3 on 12/20.      #R subclavian DVT  -Warfarin anticoagulation, pharmacy to dose     #Hypogammaglobulinemia  S/p IVIG  -Repeat IgG 12/15 365  -Repeat ~12/30     Endocrine  #Type 2 diabetes  #Steroid-induced hyperglycemia  -Endocrine consulted while in acute hospital.  Tube feeds on hold starting 12/20.   -  Continue Lantus 9 units daily, sliding scale insulin and carbohydrate coverage- titrate as  indicated.      #Hypothyroidism  TSH 3.17 on 11/19  -Continue levothyroxine 25 mcg daily     Renal  #BRENNAN, resolved  Cr 0.82 12/20, stable  -Monitor intermittently     MSK/Rheum  #Rheumatoid arthritis  Previously treated with rituximab.  Rheumatology familiar and consulted early in admission.  -Steroids as above     GI/FEN  #Severe malnutrition  Patient currently has PEG in place.  TF held starteding 12/20. Monitor calorie counts.  -Continue multivitamin, folate, B6, B12 supplements  -Dietician assistance appreciated  -Encourage p.o. intake     #hx of GI Bleed  Patient with progressive hypotension and CODE BLUE for bradycardia/asystole associated with GI bleed in the setting of anticoagulation, requiring massive transfusion protocol on 10/22.  EGD on 11/2 with clotted blood in stomach, adherent clot near PEG site that was clipped without active bleeding.  Most recent EGD 11/5 with ulcer noted near the PEG bumper site, gastritis, suction marks from the G-tube.  GJ tube was exchanged 11/9 by GI. Hgb 9.5 12/20  -Monitor hemoglobin intermittently.  -Continue PPI BID      Bowel/Bladder  Bowel, continent  Constipation  - Senna-docusate 1-2 tablets BID  - Miralax QDAY PRN  - Bisacodyl suppository 10mg QDAY PRN     Bladder, continent     1. Adjustment to disability:  Clinical psychology to eval and treat as indicated  2. FEN: Regular with thin liquids, holding tube feeds  3. Bowel: continent, meds as above  4. Bladder: continent  5. DVT Prophylaxis: warfarin  6. GI Prophylaxis: PPI  7. Code: full  8. Disposition: Local housing - [facility]  9. ELOS:  2-3 weeks  10. Rehab prognosis:  good  9. Follow up Appointments on Discharge:                -Neurology 1-2 months after discharge               -Outpatient cardiac/pulmonary Rehab               -Transplant Coordinator               -ID     Susan San PA-C   Department of Rehabilitation Medicine    Time Spent on this Encounter   I, Lan Coronado, spent a total of 35  minutes face-to-face or managing the care of Edson Thornton. Over 50% of my time on the unit was spent counseling the patient and coordinating care. See note for details.

## 2021-12-22 ENCOUNTER — APPOINTMENT (OUTPATIENT)
Dept: OCCUPATIONAL THERAPY | Facility: CLINIC | Age: 56
End: 2021-12-22
Attending: PHYSICAL MEDICINE & REHABILITATION
Payer: COMMERCIAL

## 2021-12-22 ENCOUNTER — APPOINTMENT (OUTPATIENT)
Dept: SPEECH THERAPY | Facility: CLINIC | Age: 56
End: 2021-12-22
Attending: PHYSICAL MEDICINE & REHABILITATION
Payer: COMMERCIAL

## 2021-12-22 ENCOUNTER — APPOINTMENT (OUTPATIENT)
Dept: PHYSICAL THERAPY | Facility: CLINIC | Age: 56
End: 2021-12-22
Attending: PHYSICAL MEDICINE & REHABILITATION
Payer: COMMERCIAL

## 2021-12-22 LAB
ACID FAST STAIN (ARUP): NORMAL
GLUCOSE BLDC GLUCOMTR-MCNC: 104 MG/DL (ref 70–99)
GLUCOSE BLDC GLUCOMTR-MCNC: 113 MG/DL (ref 70–99)
GLUCOSE BLDC GLUCOMTR-MCNC: 122 MG/DL (ref 70–99)
GLUCOSE BLDC GLUCOMTR-MCNC: 146 MG/DL (ref 70–99)
GLUCOSE BLDC GLUCOMTR-MCNC: 94 MG/DL (ref 70–99)
INR PPP: 2.36 (ref 0.86–1.14)

## 2021-12-22 PROCEDURE — 97110 THERAPEUTIC EXERCISES: CPT | Mod: GP

## 2021-12-22 PROCEDURE — 250N000009 HC RX 250: Performed by: INTERNAL MEDICINE

## 2021-12-22 PROCEDURE — 94664 DEMO&/EVAL PT USE INHALER: CPT

## 2021-12-22 PROCEDURE — 128N000003 HC R&B REHAB

## 2021-12-22 PROCEDURE — 85610 PROTHROMBIN TIME: CPT | Performed by: INTERNAL MEDICINE

## 2021-12-22 PROCEDURE — 250N000013 HC RX MED GY IP 250 OP 250 PS 637: Performed by: PHYSICAL MEDICINE & REHABILITATION

## 2021-12-22 PROCEDURE — 99233 SBSQ HOSP IP/OBS HIGH 50: CPT | Mod: 24 | Performed by: PHYSICAL MEDICINE & REHABILITATION

## 2021-12-22 PROCEDURE — 94640 AIRWAY INHALATION TREATMENT: CPT | Mod: 76

## 2021-12-22 PROCEDURE — 97535 SELF CARE MNGMENT TRAINING: CPT | Mod: GO

## 2021-12-22 PROCEDURE — 97129 THER IVNTJ 1ST 15 MIN: CPT | Mod: GN

## 2021-12-22 PROCEDURE — 250N000009 HC RX 250: Performed by: PHYSICAL MEDICINE & REHABILITATION

## 2021-12-22 PROCEDURE — 250N000011 HC RX IP 250 OP 636: Performed by: PHYSICIAN ASSISTANT

## 2021-12-22 PROCEDURE — 97130 THER IVNTJ EA ADDL 15 MIN: CPT | Mod: GN

## 2021-12-22 PROCEDURE — 36592 COLLECT BLOOD FROM PICC: CPT | Performed by: INTERNAL MEDICINE

## 2021-12-22 PROCEDURE — 250N000013 HC RX MED GY IP 250 OP 250 PS 637: Performed by: PHYSICIAN ASSISTANT

## 2021-12-22 PROCEDURE — 94640 AIRWAY INHALATION TREATMENT: CPT

## 2021-12-22 PROCEDURE — 250N000013 HC RX MED GY IP 250 OP 250 PS 637: Performed by: INTERNAL MEDICINE

## 2021-12-22 PROCEDURE — 250N000012 HC RX MED GY IP 250 OP 636 PS 637: Performed by: INTERNAL MEDICINE

## 2021-12-22 PROCEDURE — 99232 SBSQ HOSP IP/OBS MODERATE 35: CPT | Performed by: INTERNAL MEDICINE

## 2021-12-22 PROCEDURE — 999N000157 HC STATISTIC RCP TIME EA 10 MIN

## 2021-12-22 RX ORDER — CEFTAZIDIME 2 G/1
2 INJECTION, POWDER, FOR SOLUTION INTRAVENOUS EVERY 8 HOURS
Status: COMPLETED | OUTPATIENT
Start: 2021-12-22 | End: 2021-12-29

## 2021-12-22 RX ORDER — WARFARIN SODIUM 2 MG/1
2 TABLET ORAL
Status: COMPLETED | OUTPATIENT
Start: 2021-12-22 | End: 2021-12-22

## 2021-12-22 RX ORDER — FLUCONAZOLE 2 MG/ML
400 INJECTION, SOLUTION INTRAVENOUS EVERY 24 HOURS
Status: DISCONTINUED | OUTPATIENT
Start: 2021-12-22 | End: 2021-12-30 | Stop reason: HOSPADM

## 2021-12-22 RX ADMIN — PREDNISONE 10 MG: 10 TABLET ORAL at 21:16

## 2021-12-22 RX ADMIN — CALCIUM CARBONATE 600 MG (1,500 MG)-VITAMIN D3 400 UNIT TABLET 1 TABLET: at 08:47

## 2021-12-22 RX ADMIN — TACROLIMUS 2 MG: 1 CAPSULE ORAL at 18:20

## 2021-12-22 RX ADMIN — MULTIPLE VITAMINS W/ MINERALS TAB 1 TABLET: TAB at 08:47

## 2021-12-22 RX ADMIN — MYCOPHENOLATE MOFETIL 1000 MG: 250 CAPSULE ORAL at 21:16

## 2021-12-22 RX ADMIN — PREDNISONE 10 MG: 10 TABLET ORAL at 08:47

## 2021-12-22 RX ADMIN — FLUCONAZOLE, SODIUM CHLORIDE 400 MG: 2 INJECTION INTRAVENOUS at 08:45

## 2021-12-22 RX ADMIN — FLUTICASONE PROPIONATE 1 SPRAY: 50 SPRAY, METERED NASAL at 08:48

## 2021-12-22 RX ADMIN — CEFTAZIDIME 2 G: 2 INJECTION, POWDER, FOR SOLUTION INTRAVENOUS at 11:05

## 2021-12-22 RX ADMIN — NYSTATIN 1000000 UNITS: 100000 SUSPENSION ORAL at 13:22

## 2021-12-22 RX ADMIN — LEVALBUTEROL HYDROCHLORIDE 1.25 MG: 1.25 SOLUTION RESPIRATORY (INHALATION) at 13:44

## 2021-12-22 RX ADMIN — QUETIAPINE FUMARATE 25 MG: 25 TABLET ORAL at 21:16

## 2021-12-22 RX ADMIN — SULFAMETHOXAZOLE AND TRIMETHOPRIM 1 TABLET: 400; 80 TABLET ORAL at 08:46

## 2021-12-22 RX ADMIN — TACROLIMUS 2.5 MG: 1 CAPSULE ORAL at 08:46

## 2021-12-22 RX ADMIN — ROSUVASTATIN CALCIUM 10 MG: 10 TABLET, FILM COATED ORAL at 08:46

## 2021-12-22 RX ADMIN — NYSTATIN 1000000 UNITS: 100000 SUSPENSION ORAL at 21:16

## 2021-12-22 RX ADMIN — ACETYLCYSTEINE 2 ML: 200 SOLUTION ORAL; RESPIRATORY (INHALATION) at 21:25

## 2021-12-22 RX ADMIN — Medication 25 MG: at 21:16

## 2021-12-22 RX ADMIN — Medication 800 MG: at 08:47

## 2021-12-22 RX ADMIN — MYCOPHENOLATE MOFETIL 1000 MG: 250 CAPSULE ORAL at 08:46

## 2021-12-22 RX ADMIN — Medication 1 TABLET: at 08:47

## 2021-12-22 RX ADMIN — PANTOPRAZOLE SODIUM 40 MG: 40 TABLET, DELAYED RELEASE ORAL at 08:47

## 2021-12-22 RX ADMIN — NYSTATIN 1000000 UNITS: 100000 SUSPENSION ORAL at 16:29

## 2021-12-22 RX ADMIN — CALCIUM CARBONATE 600 MG (1,500 MG)-VITAMIN D3 400 UNIT TABLET 1 TABLET: at 18:21

## 2021-12-22 RX ADMIN — INSULIN ASPART 2 UNITS: 100 INJECTION, SOLUTION INTRAVENOUS; SUBCUTANEOUS at 11:05

## 2021-12-22 RX ADMIN — LEVALBUTEROL HYDROCHLORIDE 1.25 MG: 1.25 SOLUTION RESPIRATORY (INHALATION) at 10:19

## 2021-12-22 RX ADMIN — WARFARIN SODIUM 2 MG: 2 TABLET ORAL at 18:20

## 2021-12-22 RX ADMIN — ACETYLCYSTEINE 2 ML: 200 SOLUTION ORAL; RESPIRATORY (INHALATION) at 10:20

## 2021-12-22 RX ADMIN — PANTOPRAZOLE SODIUM 40 MG: 40 TABLET, DELAYED RELEASE ORAL at 16:29

## 2021-12-22 RX ADMIN — Medication 800 MG: at 21:16

## 2021-12-22 RX ADMIN — CEFTAZIDIME 2 G: 2 INJECTION, POWDER, FOR SOLUTION INTRAVENOUS at 18:20

## 2021-12-22 RX ADMIN — LEVOTHYROXINE SODIUM 25 MCG: 25 TABLET ORAL at 08:47

## 2021-12-22 RX ADMIN — NYSTATIN 1000000 UNITS: 100000 SUSPENSION ORAL at 08:46

## 2021-12-22 RX ADMIN — LEVALBUTEROL HYDROCHLORIDE 1.25 MG: 1.25 SOLUTION RESPIRATORY (INHALATION) at 21:25

## 2021-12-22 RX ADMIN — FUROSEMIDE 40 MG: 40 TABLET ORAL at 08:47

## 2021-12-22 RX ADMIN — INSULIN GLARGINE 9 UNITS: 100 INJECTION, SOLUTION SUBCUTANEOUS at 08:47

## 2021-12-22 RX ADMIN — AMLODIPINE BESYLATE 5 MG: 5 TABLET ORAL at 08:47

## 2021-12-22 RX ADMIN — ACETYLCYSTEINE 2 ML: 200 SOLUTION ORAL; RESPIRATORY (INHALATION) at 13:45

## 2021-12-22 RX ADMIN — ESCITALOPRAM OXALATE 20 MG: 20 TABLET ORAL at 08:47

## 2021-12-22 ASSESSMENT — ACTIVITIES OF DAILY LIVING (ADL)
ADLS_ACUITY_SCORE: 22
ADLS_ACUITY_SCORE: 24
ADLS_ACUITY_SCORE: 22
ADLS_ACUITY_SCORE: 22
ADLS_ACUITY_SCORE: 24
ADLS_ACUITY_SCORE: 24
ADLS_ACUITY_SCORE: 22
ADLS_ACUITY_SCORE: 24

## 2021-12-22 NOTE — PLAN OF CARE
FOCUS/GOAL  Medication management, Mobility, Skin integrity, and Energy conservation    ASSESSMENT, INTERVENTIONS AND CONTINUING PLAN FOR GOAL:    A&O x 4. Calls appropriately. Transfers with CGA and FWW. Does have some dyspnea with exertion but declines the need for oxygen. Denies pain or dizziness. Regular diet, thin liquids, takes pills whole. Pt is on consistent carb diet. Triple lumen PICC to LUE, IV abx infused this shift per orders. Old trach and PEG tube dressings CDI, PEG tube flushed with 200 mL. Continent of bowel and bladder. Last BM 12/21. PRN trazodone given per patients request. Discharge plans pending medical readiness.     Hodan Aguilar RN

## 2021-12-22 NOTE — PROGRESS NOTES
Calorie Count:  12/21: 2182 kcal and 86 g protein (2 meals, 2 supplements)    Zenaida Washington RD, CNSC, LD  ARU RD pager: [phone]

## 2021-12-22 NOTE — PLAN OF CARE
Discharge Planner Post-Acute Rehab SLP:      Discharge Plan: [facility] with fiance , further SLP TBD     Precautions: aspiration precautions, sternal, fall     Current Status:  Communication: WFL.   Cognition: Mild-moderate cognitive impairments with deficits re: attention, exeuctive function, memory, and visuospatial skills, word finding.  Swallow: Regular, thin liquid. Medications to be given orally with thin liquids. Will continue to monitor.      Assessment:  Targeted visual attention. Pt introduced to strategies to  Reduce visual load and increase sustained /alternating attention to information presented visually. Pt with great implementation of strategy and completed tasks  With 100% accuracy, min cues. Benefited from few instances mod cues to redirect to all  Information and ensure accuracy of content.   Other Barriers to Discharge (Family Training, etc):

## 2021-12-22 NOTE — PLAN OF CARE
Discharge Planner Post-Acute Rehab OT:     Discharge Plan: to [facility] with fiance    Precautions: sternal, fall    Current Status:  ADLs:    Mobility: CGA for in room mobility using walker    Grooming: CGA standing at the sink, chair behind for intermittent rest breaks, Seated only w/ set up to manage fatigue.     Dressing: UB TBD due to IV line, CGA/SBA for LB donning pants seated and standing to pull up, additional effort due to fatigue- pt may benefit from a reacher to decreased trunk bending.     Bathing: Min A w/shower chair    Toileting: CGA for transfer using walker, SBA for [place]-cares to follow precautions  IADLs: fiance will assist  Vision/Cognition: L visual deficit at baseline and new right eye blind spot. Pt scored 24/30 on the SLUMS indicating mild deficit    Assessment: Pt was motivated this AM session for OT but more reported more limited BLE activity tolerance and declined short ambulation to gym. Pt completed ADLs seated and toilet tsfe/ needs. Pt self pacing and taking breaks to manage his fatigue but sometimes fatigue being a safety issue when pt needing to sit down too quickly, pt educated to prompt this OT when needing to sit to manage IV pole/lines carefully.    Pt educated on EC strategies and seated tasks to manage his fatigue safely and pt was receptive to education. Pt can continue to incorporate EC strategies into his daily routine and work on UE calisthenics to increase upper respiratory conditioning and breathing.     -15 mins MD/resident in with patient    Other Barriers to Discharge (DME, Family Training, etc): poor activity endurance

## 2021-12-22 NOTE — PROGRESS NOTES
[facility]    Progress Note - TCU Service        Date of Admission:  12/13/2021    Assessment & Plan             Mr. 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     Today's changes: 12/21/21  No new issues.   Continue current plan of care  Continue IV ceftazidime and fluconazole per ID  Discontinued CCI, bedtime correction insulin as patient has not needed them for the past days.    # ILD and NSIP s/p BSLT on 10/16/2021  # Acute on chronic hypoxic respiratory failure s/p tracheostomy on 10/29/21 and decannulation on 12/8/2021  # Bilateral pleural effusions, improved.  - Transplant pulmonology following. IS dosing per Pulmonary team.    Prophylaxis:   - Bactrim for PJP ppx (held 11/2-11/5 d/t BRENNAN)  - Nystatin for oral candidiasis ppx, 6 month course  - Monthly EBV, CMV labs (last sent on 12/15/21, negative for both)    -Immunosuppression: s/p induction therapy with basiliximab 10/16 (and high dose IV steroid) and 10/20 Continue tacrolimus 2.5 mg qAM / 2 mg qPM (decreased 12/11).  Goal level 8-12. 12/16: 8.6.  [id number] mg BID (decreased 11/2 given GI bleed, AZA to be avoided given TPMT) , prednisone 10 mg BID, next taper due 1/2/22   - qM/Th CBC/BMP tacrolimus levels, fluconazole continued per ID     Positive cross match:   Positive DSA: Note that he received two doses of rituximab in June, which is likely contributing to cross match result.  DSA newly positive 11/29 with DQB5 (mfi 2522), decreased (mfi 1873) on 12/6. DSA on 12/12, decreased further to 1703.   - Repeat DSA (12/26)  every two weeks     Others:   - Levalbuterol and mucomyst TID and pulm toilet and chest PT BID  - PT/OT/SLP -@ PM&R team.   - Continue 40 mg of Lasix daily  -  Oxycodone discontinued   - Acetaminophen PRN 975mg tid    # Left lung cavitary lesion   # Klebsiella pneumoniae and Pseudomonas fluorescens/putida HAP  #Candida empyema  aspiration vs pseudomonal vs Klebsiella pneumonia related abscess. Indeterminate Quant Gold, but negative tuberculin skin tests on mulitple occurences. S/p BAL on 11/22.Cocci antigen in urine negative, serum, BAL histoplasmosis negative, CRAG negative, respiratory viral panel (-), TB (-), candida growth from bal culture. Transplant ID consulted, saw the patient on 12/21.      -  Continue IV Ceftazdime 2 grams Q8H (until 1/18/22), switch to levofloxacin 500mg PO after discharge from TCU  - Discontinue monthly coccidioides Ag  - Continue IV fluconazole till 1/18/22  - Follow up CT chest on 1/18/22 (ordered)  - Follow up with Dr. Abebe on 1/18/2022 @6:00 PM in a virtual visit.     # Hypogammaglobinemia:   IgG previously low at 364 (9/7).  Noted at 265 at time of transplant, s/p IVIG 10/21, repeat IgG (11/17) 198, s/p IVIG (with premedication) 11/18. Repeat IVIG on 12/15 of 365;   Per Pulm: IVIG indicated but ARU  unable to administer IVIG and patient is unable to leave to receive it in an infusion center.  - Repeat IgG ~ 12/30  - Further per Transplant team.     # Encephalopathy, resolved  # Difficulty sleeping  Likely multi-factorial in the setting of stroke, prolonged critical illness.  - Ct Seroquel 25 mg TID prn and 50 mg at bedtime. Wean down as tolerated.   - Melatonin 10 mg at bedtime  - Trazodone 25 mg prn.      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 MRI brain on 10/23 with multifocal subacute infarct within both cerebral hemispheres and left cerebellum. Presumed embolic with afib hx, identified  while inpatient. He is currently able to ambulate with relative ease.     - Continue warfarin per pharmacy protocol      # Right subclavian DVT   Diagnosed on 11/4 on ultrasound.  - Continue warfarin per pharmacy protocol  - INR therapeutic.      # DM2 with steroid induced hyperglycemia  Hemoglobin A1c 6.6 pre-operatively. Endocrine recently consulted for steroids induced hyperglycemia.      -Continue glargine 9IU qAM  -Continue NPH 45 IU at bedtime when on Tube feeds  -Has not required correction insulin over the past 5 days, discontinued the carbohydrate consistent and the bedtime correction insulins.     # Severe malnutrition in the context of acute on chronic illness  Patient currently has a PEG J-tube and is getting the Jorde of nutrition through cycle tube feeds.  - Multivitamin, folic acid, B6, B12 supplements   - TF per nutrition; continue oral diet as well     # Anxiety and depression   Psychiatry reconsulted and after discussion suggest increasing lexapro. Will discuss with patient regarding starting ritalin in the AM for motivation.  -Ct Lexapro 20 mg daily (increased dose)  -As needed hydroxyzine.     # Concern for chipped tooth  # Poor dental hygiene  Patient noted he feels like he may have chipped his tooth or has a loose tooth after eating guevara. It is not painful.   - Dental hygiene consult placed and chipped tooth stable_ rec oral hygiene cares.     # Rheumatoid arthritis- Dx 5/2021 with + CCP antibody and RF. Previously treated with rituximab. Rheum consulted early in admission. On steroids as noted above.      # SALTY - Noted pre-transplant. Home CPAP 6-12 cm H2O.  - Evaluate need for BiPAP after trach site has healed     # HTN- Continue PTA amlodipine.   Stable.      # Hypothyroidism - TSH 3.17 on 11/19/21.   -Continue PTA levothyroxine 25 mcg daily.     # Recurrent GIB - hgb drop on 10/22 s/p 2 unit pRBC transfusion with EGD on 10/23 with NJ/OG tube trauma with scant oozing. Patient then developed  progressive hypotension and ultimately CODE BLUE for GIB in setting of anticoagulation with heparin drip, s/p massive transfusion protocol. EGD on 11/2 with large amount of clotted blood in stomach and area of raised mucosa with small adherent clot near PEG tube site that was clipped, no active bleeding.  Maroon stools 11/3, repeat EGD with extensive old blood in stomach, no active bleeding, small nodular area with prior clips clipped again.  Most recent EGD 11/5 with ulcer noted at PEG tube bumper site, gastritis, and suction marks from G tube. TF noted in G tube drainage bag 11/7, AXR with GJ tube tip projecting over proximal duodenum. GJ tube exchanged 11/9 by GI.     - PO PPI BID    .   # Hypomagnesemia: Suppressed reabsorption 2/2 CNI.  Requiring intermittent IV and PO replacement.  - Mag-Ox 800 mg qAM / 400 mg qPM (increased 12/8)     # Tremors: Potentially tacrolimus related. Ct to monitor.   Improving.      Diet: Room Service  Regular Diet Adult Thin Liquids (level 0)  Calorie Counts  Adult Formula Drip Feeding: Continuous Nepro with Carbsteady; Jejunostomy; Goal Rate: Hold starting today (12/20); mL/hr; Medication - Feeding Tube Flush Frequency: At least 15-30 mL water before and after medication administration and with tube c...  Snacks/Supplements Adult: Nepro Oral Supplement; With Meals    DVT Prophylaxis: Warfarin  Watson Catheter: Not present  Fluids: None  Central Lines: None  Code Status: Full Code      Disposition Plan   Expected Discharge: 12/30/2021     Anticipated discharge location:  Awaiting care coordination huddle       The patient's care was discussed with the Attending Physician, Dr. Gallegos.    Sherri Layne MD  TCU Service  [facility]  Securely message with the Vocera Web Console (learn more here)  Text page via [employer] Paging/Directory    ______________________________________________________________________    Interval History   No acute  events overnight. Yesterday's notes reviewed AM. Patient reports improved breathing, and bowel movements getting more regular. Slept well overnight, denies pain.    Data reviewed today: I reviewed all medications, new labs and imaging results over the last 24 hours. I personally reviewed no images or EKG's today.    Physical Exam   Vital Signs: Temp: 97.5  F (36.4  C) Temp src: Oral BP: 127/89 Pulse: 102   Resp: 20 SpO2: 97 % O2 Device: None (Room air)    Weight: 150 lbs 9.6 oz  General Appearance: AAOx4, sitting on chair, NAD, appears comfortable  Respiratory: CTAB, no crackles, rales or wheezing, patient on room air  Cardiovascular: S1, S2, rrr, no m/r/g  GI: Soft, non-tender, non distended. bs (+), PEG Tube in place  Skin: MEMO (-)  Other: AAOx4, Hand  weaker on the left side compared to the right. Strength 5/5 on elbow flexion, 4+ bilaterally for arm extension, 4 on leg raise, 5/5 for dorsiflexion.Intentional tremor on bilateral upper extremities much less intense compared to previous visit  Data   Recent Labs   Lab 12/22/21  1052 12/22/21  0753 12/22/21  0724 12/22/21  0214 12/21/21  0808 12/21/21  0659 12/20/21  0824 12/20/21  0752 12/17/21  0819 12/17/21  0759 12/16/21  1655 12/16/21  1611 12/16/21  0707 12/16/21  0705 12/15/21  1704 12/15/21  1139   WBC  --   --   --   --   --   --   --  9.3  --  11.6*  --  15.8*  --   --   --   --    HGB  --   --   --   --   --   --   --  9.5*  --  9.9*  --  10.6*  --   --   --   --    MCV  --   --   --   --   --   --   --  91  --  90  --  89  --   --   --   --    PLT  --   --   --   --   --   --   --  165  --  171  --  199  --   --   --   --    INR  --   --  2.36*  --   --  2.30*  --  2.22*   < > 2.77*  --   --    < >  --   --   --    NA  --   --   --   --   --   --   --  142  --   --   --   --   --  137  --   --    POTASSIUM  --   --   --   --   --   --   --  4.2  --   --   --   --   --  4.8  --  4.4   CHLORIDE  --   --   --   --   --   --   --  108  --   --   --   --    --  106  --   --    CO2  --   --   --   --   --   --   --  27  --   --   --   --   --  27  --   --    BUN  --   --   --   --   --   --   --  30  --   --   --   --   --  29  --   --    CR  --   --   --   --   --   --   --  0.82  --   --   --   --   --  0.74  --   --    ANIONGAP  --   --   --   --   --   --   --  7  --   --   --   --   --  4  --   --    ERIK  --   --   --   --   --   --   --  9.2  --   --   --   --   --  9.5  --   --    * 122*  --  146*   < >  --    < > 129*   < >  --    < >  --    < > 90   < >  --     < > = values in this interval not displayed.

## 2021-12-22 NOTE — PLAN OF CARE
Discharge Planner Post-Acute Rehab SLP:      Discharge Plan: [facility] with fiance , further SLP TBD     Precautions: aspiration precautions, sternal, fall     Current Status:  Communication: WFL.   Cognition: Mild-moderate cognitive impairments with deficits re: attention, exeuctive function, memory, and visuospatial skills, word finding.  Swallow: Regular, thin liquid. Medications to be given orally with thin liquids. Will continue to monitor.      Assessment:  Targeted visual attention. Pt introduced to strategies to  Reduce visual load and increase sustained /alternating attention to information presented visually. Pt with great implementation of strategy and completed tasks  With 100% accuracy, min cues. Benefited from few instances mod cues to redirect to all  Information and ensure accuracy of content.     Pt participated in using visual to solve and answer functional math ?s'. Pt introduced to strategies to assist in IND, accuracy, and visual organization of task. Pt completed task with 90% accuracy, beneiftted from cues to redirect to all information presented and to slow down      Other Barriers to Discharge (Family Training, etc):

## 2021-12-22 NOTE — PLAN OF CARE
FOCUS/GOAL  Medical management    ASSESSMENT, INTERVENTIONS AND CONTINUING PLAN FOR GOAL:  Patient is pleasantly alert, able to make needs known, slept comfortably overnight awaken in between toilet needs and cares, patient appears comfortable when doing rounds, breathing normal, no physical indication of pain noted. Patient used urinal independently, emptied by staff. PICC line to LUE flushed as ordered, FSG @ 0200 was 146. Safety rounding checked implemented, bed alarm ON, call light in reach, no concern at this time, may continue with POC.

## 2021-12-22 NOTE — PROGRESS NOTES
[facility]   Acute Rehabilitation Unit  Daily progress note    INTERVAL HISTORY  No acute events overnight.  This morning Bret feels well and has no concerns or complaints.  Activity tolerance and dyspnea are improving, although shortness of breath with activity is still present.  Appetite also improving, and ate 2182 calories per RD yesterday although he notes it takes him 1 to 1.5 hours to complete a meal.  Denies chest pain.  Had follow up with ID yesterday and Fluconazole and Ceftazidime will be continued given CT findings.  Functionally he is CGA for mobility, toileting, grooming, and dressing, Min A bathing, ambulating 45 ft with CGA and a WW with WC follow.    MEDICATIONS  Scheduled meds    acetylcysteine  2 mL Nebulization TID     amLODIPine  5 mg Oral Daily     calcium carbonate 600 mg-vitamin D 400 units  1 tablet Oral or Feeding Tube BID w/meals     cefTAZidime  2 g Intravenous Q8H     escitalopram  20 mg Oral or Feeding Tube Daily     fluconazole  400 mg Intravenous Q24H     fluticasone  1 spray Both Nostrils Daily     folic acid-vit B6-vit B12  1 tablet Oral Daily     furosemide  40 mg Oral Daily     insulin aspart  1-7 Units Subcutaneous TID AC     insulin glargine  9 Units Subcutaneous QAM     [Held by provider] insulin NPH  24 Units Subcutaneous Q24H     levalbuterol  1.25 mg Nebulization TID     levothyroxine  25 mcg Oral Daily     magnesium oxide  800 mg Oral BID     multivitamin w/minerals  1 tablet Oral Daily     mycophenolate  1,000 mg Oral BID     nystatin  1,000,000 Units Swish & Swallow 4x Daily     pantoprazole  40 mg Oral BID AC     predniSONE  10 mg Oral or Feeding Tube BID     QUEtiapine  25 mg Oral At Bedtime     rosuvastatin  10 mg Oral or Feeding Tube Daily     sodium chloride (PF)  10-40 mL Intracatheter Q8H     sulfamethoxazole-trimethoprim  1 tablet Oral or Feeding Tube Daily     tacrolimus  2 mg Oral QPM     tacrolimus  2.5 mg Oral QAM  "      PRN meds:  acetaminophen, albuterol, bisacodyl, dextrose, glucose **OR** dextrose **OR** glucagon, hydrOXYzine, lidocaine (viscous), magnesium hydroxide, melatonin, naloxone **OR** naloxone **OR** naloxone **OR** naloxone, ondansetron, - MEDICATION INSTRUCTIONS -, prochlorperazine, QUEtiapine, senna-docusate, sodium chloride (PF), traZODone, Warfarin Therapy Reminder, zinc-white petrolatum      PHYSICAL EXAM  /89 (BP Location: Right arm)   Pulse 102   Temp 97.5  F (36.4  C) (Oral)   Resp 20   Ht 1.646 m (5' 4.8\")   Wt 68.3 kg (150 lb 9.6 oz)   SpO2 97%   BMI 25.22 kg/m    Gen: Awake, coooperative NAD  HEENT: Trach site now healed  Cardio: RRR, S1+S2, no m/r/g  Pulm: Non-labored breathing at rest, with lungs clear diminished at bases.  Abd: Soft, non-tender to palpation, bowel sounds present. PEG in place   Ext: No edema in lower or upper extremities b/l, no calf tenderness  Neuro/MSK: alert, oriented, answers questions appropriately, follows commands. Tremor improved today.      LABS  Recent Labs   Lab 12/22/21  1052 12/22/21  0753 12/22/21  0724 12/22/21  0214 12/21/21  0808 12/21/21  0659 12/20/21  0824 12/20/21  0752 12/17/21  0819 12/17/21  0759 12/16/21  1655 12/16/21  1611 12/16/21  0707 12/16/21  0705 12/15/21  1704 12/15/21  1139   WBC  --   --   --   --   --   --   --  9.3  --  11.6*  --  15.8*  --   --   --   --    HGB  --   --   --   --   --   --   --  9.5*  --  9.9*  --  10.6*  --   --   --   --    MCV  --   --   --   --   --   --   --  91  --  90  --  89  --   --   --   --    PLT  --   --   --   --   --   --   --  165  --  171  --  199  --   --   --   --    INR  --   --  2.36*  --   --  2.30*  --  2.22*   < > 2.77*  --   --    < >  --   --   --    NA  --   --   --   --   --   --   --  142  --   --   --   --   --  137  --   --    POTASSIUM  --   --   --   --   --   --   --  4.2  --   --   --   --   --  4.8  --  4.4   CHLORIDE  --   --   --   --   --   --   --  108  --   --   --   --   " --  106  --   --    CO2  --   --   --   --   --   --   --  27  --   --   --   --   --  27  --   --    BUN  --   --   --   --   --   --   --  30  --   --   --   --   --  29  --   --    CR  --   --   --   --   --   --   --  0.82  --   --   --   --   --  0.74  --   --    ANIONGAP  --   --   --   --   --   --   --  7  --   --   --   --   --  4  --   --    ERIK  --   --   --   --   --   --   --  9.2  --   --   --   --   --  9.5  --   --    * 122*  --  146*   < >  --    < > 129*   < >  --    < >  --    < > 90   < >  --     < > = values in this interval not displayed.     No results found for this or any previous visit (from the past 24 hour(s)).    ASSESSMENT AND PLAN    Edson Thornton is a 56 year old right hand dominant male with a hx of NSIP/ILD associated with rheumatoid lung disease, hypertension, hyperlipidemia, RA who initially presented 9/5/2021 with acute on chronic respiratory failure for transplant work-up s/p bilateral lung transplant (10/16/21) with prolonged hospitalization associated with multifocal bilateral acute/subacute ischemic infarcts of bilateral frontal, occipital lobes.  Postoperative course complicated by prolonged ventilator wean s/p trach and PEG placement (10/29), encephalopathy, new Afib with RVR, BRENNAN, candidemia/Candida empyema, and brief bradycardic arrest with subsequent hypotension in the setting of GI bleed.  [facility] and was decannulated 12/8/2021.  He is currently stable on room air at admission to acute rehab.  He presents with right upper extremity weakness, bilateral dysmetria/tremors, acute on chronic severe deconditioning, dyspnea on exertion, and impairment of ADLs and gait.     Admission to acute inpatient rehab 12/13/2021  .    Impairment group code: Stroke Ischemic 01.3 Bilateral Involvement: embolic CVA affecting B cerebral hemispheres and L cerebellum, as well as s/p bilateral lung transplantation     1. PT, OT 90 minutes  of each on a daily basis, in addition to rehab nursing and close management of physiatrist.   2. Impairment of ADLs/IADLs:  OT for 60 min daily to work on upper and lower body self care, dressing, toileting, bathing, energy conservation techniques with use of ADs as needed.   3. Impairment of mobility:   PT for 60 min daily to work on gait exercises, strengthening, endurance buildup, transfers with use of walker as needed.   4. Impairment of cognition:  SLP for 60 minutes for cognitive evaluation and treatment strategies for higher level cognitive deficits, and memory impairment.  5. Rehab RN to administer medication, patient education on medication taking, VS monitoring, bowel regimen, glucose monitoring and wound care/surgical wound dressing changes and monitoring.   6. Medical Conditions - Hospitalist team consulted, appreciate assistance     Neuro/Psych  #Multifocal acute to subacute ischemic stroke, etiology likely embolic vs perioperative  Initially noted 10/22 and 11/6 with stroke code called for change in exam.  MRI brain 10/23 with infarcts noted in both cerebral hemispheres (R frontal, L corona radiata, bilateral occipital), left cerebellum, presumed associated with new Afib vs perioperative. Bilateral upper extremity paresis (R>L), suspicion for some cortical blindness  - Stroke risk factor management:              -Warfarin anticoagulation for Afib,appreciate pharmacy assistance with dosing              -BP goals: SBP<140              -continue Rosuvastatin 10 mg qday              -Not smoking              -Hgb A1c: 6.6%     #Pain  -Acetaminophen 975mg q8h PRN  -Lidocaine patch  -Menthol patch     #Encephalopathy  #Sleep  -Delirium precautions  -Seroquel 25 mg at bedtime (reduced 12/20)- continue to wean off.  Plan to discontinue after tonight's dose.   -Melatonin 10mg qhs  -Trazodone 25mg prn     #Mood  #History of anxiety and depression  - PTA Lexapro increased to 10 mg while in acute hospital,  increased to 20mg 12/14  -Hydroxyzine as needed         Pulmonary  #ILD, NISP, s/p bilateral lung transplant (10/16/2021)  #Acute on chronic respiratory failure s/p tracheostomy (10/29/21) and decannulation (12/8/21)  #Bilateral Pleural Effusions  -Sternal precautions until 12 wks post op (1/8/2022)  -Immunosuppression:              -Continue tacrolimus  goal 8-12              -Continue MMF 1000mg BID              -Continue prednisone 10mg BID  -Monitoring lab/imaging              -DSA q2wks              -Monthly Coccidioides Ag per transplant completed 12/17              -CMV, EBV per transplant team              -CXR, CBC, BMP, Tac level M/Th  -Continue Levalbuterol and mucomyst nebs QID  -Aggressive pulmonary toilet and chest PT QID  -Continue Lasix 40mg qday     #SALTY  -PTA CPAP  -Trach site now healed.  Per pulm note, evaluate need for BiPAP once this occurs.    ID  #Immunosuppression Prophylaxsis   -Bactrim x6 months  -Nystatin x6 months     #L lung cavitary lesion  #Hospital-acquired pneumonia with Klebsiella pneumoniae, Pseudomonas fluorescens/putida  Suspected aspiration versus pseudomonal vs Klebsiella pneumonia related to abscess.  CT  11/22 showing new cavitary lesion in left lung base, multifocal bilateral upper lobe groundglass opacity, and new 1.8 cm fluid collection with surrounding fat stranding in left axilla, decreased bilateral loculated pleural effusions.  QuantiFERON gold indeterminate, negative tuberculin skin test on multiple occurrences.  Underwent BAL on 11/22 with cultures growing Klebsiella and Pseudomonas fluorescens/putida, resistant to meropenem.  ID was consulted and feels this is likely fungal.  B-D glucan positive with known candidemia.  Cocci antigen in urine negative, serum histoplasmosis negative, CRAG negative  -Follow-up chest CT 12/20   1. Overall decreased (since 11/22/2021 chest CT) size and number of bilateral infectious/inflammatory groundglass opacities. 2. Decreased but  persistent left lower lobe cavitary lesion.  -Followed up with Dr. Abebe 12/21, recs appreciated   -Continue fluconazole for two additional weeks (until 1/18/2022) to complete 8 weeks of therapy for Genevieve empyema.    -Continue ceftazidime for four additional weeks until 1/18/2022 however, when ready for discharge he may be discharged on PO levaquin 500 mg daily.    -Repeat CT chest without contrast 1/18/2022 before his appointment with Dr. Abebe (1/18/22 at 6:00pm via virtual visit)   -No need to check monthly Coccidioides AG.      #Invasive Candida albicans candidiasis  Positive Candida blood cultures 10/20, 10/22.  Fungitell positive (399).  TC 10/23 without evidence of endocarditis. Ophthalmic consulted 10/24, benign funduscopic exam.  Candida empyema 10/25.  Repeated pleural effusion fungal cultures and fungal/bacterial blood cultures have been without growth since 11/18.  Initially on micafungin (10/22-10/27) before transition to fluconazole IV  -Continue fluconazole for two additional weeks (until 1/18/2022) to complete 8 weeks of therapy for Genevieve empyema per ID follow up recs 12/21     #hx of HSV  Chronic intermittent infection pretransplant, with recent HSV infection while in acute hospital (crusted lesions along left jaw) s/p 10d treatment until 10/9.     Cardiovascular  #Afib  Initially noted 10/18, converted to NSR with amio ggt. Metoprolol and amiodarone discontinued due to bradycardia. INR: 2.36 12/22  -Warfarin as dosed by pharmacy     #Hypertension  -continue PTA amlodipine 5 mg daily  -also on lasix 40 mg daily      Heme  #Leukocytosis  New leukocytosis to 13.2 on 12/14 -Now returned to normal limits at 9.3 on 12/20.      #R subclavian DVT  -Warfarin anticoagulation, pharmacy to dose     #Hypogammaglobulinemia  S/p IVIG  -Repeat IgG 12/15 365  -Repeat ~12/30     Endocrine  #Type 2 diabetes  #Steroid-induced hyperglycemia  -Endocrine consulted while in acute hospital.  Tube feeds on hold starting  12/20.   -  Continue Lantus 9 units daily, sliding scale insulin and carbohydrate coverage- titrate as indicated.      #Hypothyroidism  TSH 3.17 on 11/19  -Continue levothyroxine 25 mcg daily     Renal  #BRENNAN, resolved  Cr 0.82 12/20, stable  -Monitor intermittently     MSK/Rheum  #Rheumatoid arthritis  Previously treated with rituximab.  Rheumatology familiar and consulted early in admission.  -Steroids as above     GI/FEN  #Severe malnutrition  Patient currently has PEG in place.  TF held starteding 12/20. Monitor calorie counts.  If continues to eat well, can consider removal of PEG tube.  -Continue multivitamin, folate, B6, B12 supplements  -Dietician assistance appreciated  -Encourage p.o. intake     #hx of GI Bleed  Patient with progressive hypotension and CODE BLUE for bradycardia/asystole associated with GI bleed in the setting of anticoagulation, requiring massive transfusion protocol on 10/22.  EGD on 11/2 with clotted blood in stomach, adherent clot near PEG site that was clipped without active bleeding.  Most recent EGD 11/5 with ulcer noted near the PEG bumper site, gastritis, suction marks from the G-tube.  GJ tube was exchanged 11/9 by GI. Hgb 9.5 12/20  -Monitor hemoglobin intermittently.  -Continue PPI BID      Bowel/Bladder  Bowel, continent  Constipation  - Senna-docusate 1-2 tablets BID  - Miralax QDAY PRN  - Bisacodyl suppository 10mg QDAY PRN     Bladder, continent     1. Adjustment to disability:  Clinical psychology to eval and treat as indicated  2. FEN: Regular with thin liquids, holding tube feeds  3. Bowel: continent, meds as above  4. Bladder: continent  5. DVT Prophylaxis: warfarin  6. GI Prophylaxis: PPI  7. Code: full  8. Disposition: Local housing - [place]  9. ELOS:  2-3 weeks  10. Rehab prognosis:  good  9. Follow up Appointments on Discharge:                -Neurology 1-2 months after discharge               -Outpatient cardiac/pulmonary Rehab               -Transplant  Coordinator               -AISHA Coronado MD  Department of Rehabilitation Medicine        Time Spent on this Encounter   I, Lan Coronado, spent a total of 35 minutes face-to-face or managing the care of Edson Shieldsbrandon. Over 50% of my time on the unit was spent counseling the patient and coordinating care. See note for details.

## 2021-12-22 NOTE — PLAN OF CARE
FOCUS/GOAL  Bowel management, Bladder management, Pain management, Mobility, Skin integrity, and Safety management    ASSESSMENT, INTERVENTIONS AND CONTINUING PLAN FOR GOAL:  A/O, VS stable. Regular/thin, pills whole with water. , 106. Ate small snack, declined breakfast and lunch, generally only eats one meal. Encouraging intake to ensure adequate nutrition. Continent of bowel and bladder, last BM yesterday. No complaints of pain this shift. CGA with walker to transfer, dyspnea on exertion, wheelchair for longer distances currently. No new skin concerns, abdomen and arms bruised from injections. Calling appropriately with light. All safety factors in place, call light within reach. Continue POC.

## 2021-12-22 NOTE — PLAN OF CARE
"Discharge Planner Post-Acute Rehab PT:      Discharge Plan: [facility] with RUDY wheat, then home to [place]     Precautions: Falls, dyspnea on exertion     Current Status:  Bed Mobility: Min A  Transfer: SBA with WW  Gait:  ft with WW  Stairs: 5 step ups with min A  Balance: Multiple LOB during session and rapid sitting, recommending CGA at all times  30 seconds STS from 20\" mat using hands on FWW 12/19/21 = 6, OMNI 3/10  TUG 12/19/21, CGA with FWW = 20 seconds, OMNI 7/10     Assessment:  Patient with increased ease of breathing today. Able to amb 140 ft x 4 during session with SBA only. Tolerating mobility well, on track for discharge ~12/30.     Other Barriers to Discharge (DME, Family Training, etc): Significant deconditioning  "

## 2021-12-23 ENCOUNTER — APPOINTMENT (OUTPATIENT)
Dept: OCCUPATIONAL THERAPY | Facility: CLINIC | Age: 56
End: 2021-12-23
Attending: PHYSICAL MEDICINE & REHABILITATION
Payer: COMMERCIAL

## 2021-12-23 ENCOUNTER — APPOINTMENT (OUTPATIENT)
Dept: PHYSICAL THERAPY | Facility: CLINIC | Age: 56
End: 2021-12-23
Attending: PHYSICAL MEDICINE & REHABILITATION
Payer: COMMERCIAL

## 2021-12-23 ENCOUNTER — APPOINTMENT (OUTPATIENT)
Dept: SPEECH THERAPY | Facility: CLINIC | Age: 56
End: 2021-12-23
Attending: PHYSICAL MEDICINE & REHABILITATION
Payer: COMMERCIAL

## 2021-12-23 LAB
ALBUMIN SERPL-MCNC: 2.8 G/DL (ref 3.4–5)
ALP SERPL-CCNC: 91 U/L (ref 40–150)
ALT SERPL W P-5'-P-CCNC: 31 U/L (ref 0–70)
ANION GAP SERPL CALCULATED.3IONS-SCNC: 8 MMOL/L (ref 3–14)
AST SERPL W P-5'-P-CCNC: 22 U/L (ref 0–45)
BASOPHILS # BLD AUTO: 0 10E3/UL (ref 0–0.2)
BASOPHILS NFR BLD AUTO: 0 %
BILIRUB DIRECT SERPL-MCNC: <0.1 MG/DL (ref 0–0.2)
BILIRUB SERPL-MCNC: 0.2 MG/DL (ref 0.2–1.3)
BUN SERPL-MCNC: 29 MG/DL (ref 7–30)
CALCIUM SERPL-MCNC: 9.2 MG/DL (ref 8.5–10.1)
CHLORIDE BLD-SCNC: 107 MMOL/L (ref 94–109)
CO2 SERPL-SCNC: 26 MMOL/L (ref 20–32)
CREAT SERPL-MCNC: 0.89 MG/DL (ref 0.66–1.25)
EOSINOPHIL # BLD AUTO: 0.1 10E3/UL (ref 0–0.7)
EOSINOPHIL NFR BLD AUTO: 1 %
ERYTHROCYTE [DISTWIDTH] IN BLOOD BY AUTOMATED COUNT: 14.1 % (ref 10–15)
GFR SERPL CREATININE-BSD FRML MDRD: >90 ML/MIN/1.73M2
GLUCOSE BLD-MCNC: 130 MG/DL (ref 70–99)
GLUCOSE BLDC GLUCOMTR-MCNC: 121 MG/DL (ref 70–99)
GLUCOSE BLDC GLUCOMTR-MCNC: 131 MG/DL (ref 70–99)
GLUCOSE BLDC GLUCOMTR-MCNC: 187 MG/DL (ref 70–99)
GLUCOSE BLDC GLUCOMTR-MCNC: 200 MG/DL (ref 70–99)
GLUCOSE BLDC GLUCOMTR-MCNC: 99 MG/DL (ref 70–99)
HCT VFR BLD AUTO: 33.5 % (ref 40–53)
HGB BLD-MCNC: 10.3 G/DL (ref 13.3–17.7)
IMM GRANULOCYTES # BLD: 0.3 10E3/UL
IMM GRANULOCYTES NFR BLD: 3 %
INR PPP: 2.43 (ref 0.86–1.14)
LYMPHOCYTES # BLD AUTO: 0.8 10E3/UL (ref 0.8–5.3)
LYMPHOCYTES NFR BLD AUTO: 9 %
MAGNESIUM SERPL-MCNC: 1.6 MG/DL (ref 1.6–2.3)
MCH RBC QN AUTO: 27.3 PG (ref 26.5–33)
MCHC RBC AUTO-ENTMCNC: 30.7 G/DL (ref 31.5–36.5)
MCV RBC AUTO: 89 FL (ref 78–100)
MONOCYTES # BLD AUTO: 0.7 10E3/UL (ref 0–1.3)
MONOCYTES NFR BLD AUTO: 8 %
NEUTROPHILS # BLD AUTO: 6.9 10E3/UL (ref 1.6–8.3)
NEUTROPHILS NFR BLD AUTO: 79 %
NRBC # BLD AUTO: 0 10E3/UL
NRBC BLD AUTO-RTO: 0 /100
PLATELET # BLD AUTO: 187 10E3/UL (ref 150–450)
POTASSIUM BLD-SCNC: 4.5 MMOL/L (ref 3.4–5.3)
PROT SERPL-MCNC: 6 G/DL (ref 6.8–8.8)
RBC # BLD AUTO: 3.77 10E6/UL (ref 4.4–5.9)
SODIUM SERPL-SCNC: 141 MMOL/L (ref 133–144)
TACROLIMUS BLD-MCNC: 11.9 UG/L (ref 5–15)
TME LAST DOSE: NORMAL H
TME LAST DOSE: NORMAL H
WBC # BLD AUTO: 8.8 10E3/UL (ref 4–11)

## 2021-12-23 PROCEDURE — 97110 THERAPEUTIC EXERCISES: CPT | Mod: GP

## 2021-12-23 PROCEDURE — 94640 AIRWAY INHALATION TREATMENT: CPT | Mod: 76

## 2021-12-23 PROCEDURE — 250N000012 HC RX MED GY IP 250 OP 636 PS 637: Performed by: PHYSICIAN ASSISTANT

## 2021-12-23 PROCEDURE — 94640 AIRWAY INHALATION TREATMENT: CPT

## 2021-12-23 PROCEDURE — 82248 BILIRUBIN DIRECT: CPT | Performed by: PHYSICIAN ASSISTANT

## 2021-12-23 PROCEDURE — 36592 COLLECT BLOOD FROM PICC: CPT | Performed by: PHYSICIAN ASSISTANT

## 2021-12-23 PROCEDURE — 250N000013 HC RX MED GY IP 250 OP 250 PS 637: Performed by: PHYSICAL MEDICINE & REHABILITATION

## 2021-12-23 PROCEDURE — 250N000011 HC RX IP 250 OP 636: Performed by: PHYSICAL MEDICINE & REHABILITATION

## 2021-12-23 PROCEDURE — 85004 AUTOMATED DIFF WBC COUNT: CPT | Performed by: PHYSICIAN ASSISTANT

## 2021-12-23 PROCEDURE — 99232 SBSQ HOSP IP/OBS MODERATE 35: CPT | Performed by: INTERNAL MEDICINE

## 2021-12-23 PROCEDURE — 250N000009 HC RX 250: Performed by: PHYSICAL MEDICINE & REHABILITATION

## 2021-12-23 PROCEDURE — 97129 THER IVNTJ 1ST 15 MIN: CPT | Mod: GN

## 2021-12-23 PROCEDURE — 97130 THER IVNTJ EA ADDL 15 MIN: CPT | Mod: GN

## 2021-12-23 PROCEDURE — 250N000013 HC RX MED GY IP 250 OP 250 PS 637: Performed by: PHYSICIAN ASSISTANT

## 2021-12-23 PROCEDURE — 82310 ASSAY OF CALCIUM: CPT | Performed by: PHYSICIAN ASSISTANT

## 2021-12-23 PROCEDURE — 80197 ASSAY OF TACROLIMUS: CPT

## 2021-12-23 PROCEDURE — 250N000009 HC RX 250: Performed by: INTERNAL MEDICINE

## 2021-12-23 PROCEDURE — 97535 SELF CARE MNGMENT TRAINING: CPT | Mod: GO

## 2021-12-23 PROCEDURE — 83735 ASSAY OF MAGNESIUM: CPT | Performed by: PHYSICIAN ASSISTANT

## 2021-12-23 PROCEDURE — 99232 SBSQ HOSP IP/OBS MODERATE 35: CPT | Mod: 24 | Performed by: PHYSICAL MEDICINE & REHABILITATION

## 2021-12-23 PROCEDURE — 999N000157 HC STATISTIC RCP TIME EA 10 MIN

## 2021-12-23 PROCEDURE — 258N000003 HC RX IP 258 OP 636

## 2021-12-23 PROCEDURE — 250N000012 HC RX MED GY IP 250 OP 636 PS 637: Performed by: INTERNAL MEDICINE

## 2021-12-23 PROCEDURE — 128N000003 HC R&B REHAB

## 2021-12-23 PROCEDURE — 97110 THERAPEUTIC EXERCISES: CPT | Mod: GO

## 2021-12-23 PROCEDURE — 85610 PROTHROMBIN TIME: CPT | Performed by: INTERNAL MEDICINE

## 2021-12-23 PROCEDURE — 250N000011 HC RX IP 250 OP 636: Performed by: PHYSICIAN ASSISTANT

## 2021-12-23 PROCEDURE — 250N000013 HC RX MED GY IP 250 OP 250 PS 637: Performed by: INTERNAL MEDICINE

## 2021-12-23 PROCEDURE — 99233 SBSQ HOSP IP/OBS HIGH 50: CPT | Mod: 24 | Performed by: PHYSICIAN ASSISTANT

## 2021-12-23 RX ORDER — WARFARIN SODIUM 2 MG/1
2 TABLET ORAL
Status: COMPLETED | OUTPATIENT
Start: 2021-12-23 | End: 2021-12-23

## 2021-12-23 RX ORDER — PREDNISONE 10 MG/1
10 TABLET ORAL DAILY
Status: DISCONTINUED | OUTPATIENT
Start: 2022-01-02 | End: 2021-12-30 | Stop reason: HOSPADM

## 2021-12-23 RX ORDER — HEPARIN SODIUM,PORCINE 10 UNIT/ML
5-20 VIAL (ML) INTRAVENOUS
Status: DISCONTINUED | OUTPATIENT
Start: 2021-12-23 | End: 2021-12-30 | Stop reason: HOSPADM

## 2021-12-23 RX ORDER — SODIUM CHLORIDE 9 MG/ML
INJECTION, SOLUTION INTRAVENOUS
Status: COMPLETED
Start: 2021-12-23 | End: 2021-12-23

## 2021-12-23 RX ORDER — HEPARIN SODIUM,PORCINE 10 UNIT/ML
5-20 VIAL (ML) INTRAVENOUS EVERY 24 HOURS
Status: DISCONTINUED | OUTPATIENT
Start: 2021-12-23 | End: 2021-12-30 | Stop reason: HOSPADM

## 2021-12-23 RX ORDER — TACROLIMUS 1 MG/1
2 CAPSULE ORAL
Status: DISCONTINUED | OUTPATIENT
Start: 2021-12-23 | End: 2021-12-30 | Stop reason: HOSPADM

## 2021-12-23 RX ADMIN — NYSTATIN 1000000 UNITS: 100000 SUSPENSION ORAL at 12:31

## 2021-12-23 RX ADMIN — CEFTAZIDIME 2 G: 2 INJECTION, POWDER, FOR SOLUTION INTRAVENOUS at 03:37

## 2021-12-23 RX ADMIN — Medication 5 ML: at 19:18

## 2021-12-23 RX ADMIN — ROSUVASTATIN CALCIUM 10 MG: 10 TABLET, FILM COATED ORAL at 09:39

## 2021-12-23 RX ADMIN — MULTIPLE VITAMINS W/ MINERALS TAB 1 TABLET: TAB at 09:24

## 2021-12-23 RX ADMIN — LEVALBUTEROL HYDROCHLORIDE 1.25 MG: 1.25 SOLUTION RESPIRATORY (INHALATION) at 20:30

## 2021-12-23 RX ADMIN — ACETYLCYSTEINE 2 ML: 200 SOLUTION ORAL; RESPIRATORY (INHALATION) at 08:35

## 2021-12-23 RX ADMIN — NYSTATIN 1000000 UNITS: 100000 SUSPENSION ORAL at 15:56

## 2021-12-23 RX ADMIN — TACROLIMUS 2 MG: 1 CAPSULE ORAL at 17:42

## 2021-12-23 RX ADMIN — SULFAMETHOXAZOLE AND TRIMETHOPRIM 1 TABLET: 400; 80 TABLET ORAL at 09:24

## 2021-12-23 RX ADMIN — Medication 800 MG: at 09:22

## 2021-12-23 RX ADMIN — LEVALBUTEROL HYDROCHLORIDE 1.25 MG: 1.25 SOLUTION RESPIRATORY (INHALATION) at 13:46

## 2021-12-23 RX ADMIN — Medication 15 ML: at 14:00

## 2021-12-23 RX ADMIN — FLUCONAZOLE, SODIUM CHLORIDE 400 MG: 2 INJECTION INTRAVENOUS at 09:26

## 2021-12-23 RX ADMIN — PANTOPRAZOLE SODIUM 40 MG: 40 TABLET, DELAYED RELEASE ORAL at 09:24

## 2021-12-23 RX ADMIN — Medication 1 TABLET: at 09:22

## 2021-12-23 RX ADMIN — CEFTAZIDIME 2 G: 2 INJECTION, POWDER, FOR SOLUTION INTRAVENOUS at 12:31

## 2021-12-23 RX ADMIN — PREDNISONE 10 MG: 10 TABLET ORAL at 09:24

## 2021-12-23 RX ADMIN — FUROSEMIDE 40 MG: 40 TABLET ORAL at 09:24

## 2021-12-23 RX ADMIN — ACETYLCYSTEINE 2 ML: 200 SOLUTION ORAL; RESPIRATORY (INHALATION) at 13:46

## 2021-12-23 RX ADMIN — LEVOTHYROXINE SODIUM 25 MCG: 25 TABLET ORAL at 09:24

## 2021-12-23 RX ADMIN — CALCIUM CARBONATE 600 MG (1,500 MG)-VITAMIN D3 400 UNIT TABLET 1 TABLET: at 09:24

## 2021-12-23 RX ADMIN — TACROLIMUS 2.5 MG: 1 CAPSULE ORAL at 09:23

## 2021-12-23 RX ADMIN — ACETYLCYSTEINE 2 ML: 200 SOLUTION ORAL; RESPIRATORY (INHALATION) at 20:30

## 2021-12-23 RX ADMIN — NYSTATIN 1000000 UNITS: 100000 SUSPENSION ORAL at 09:22

## 2021-12-23 RX ADMIN — AMLODIPINE BESYLATE 5 MG: 5 TABLET ORAL at 09:24

## 2021-12-23 RX ADMIN — Medication 800 MG: at 20:10

## 2021-12-23 RX ADMIN — WARFARIN SODIUM 2 MG: 2 TABLET ORAL at 17:42

## 2021-12-23 RX ADMIN — LEVALBUTEROL HYDROCHLORIDE 1.25 MG: 1.25 SOLUTION RESPIRATORY (INHALATION) at 08:35

## 2021-12-23 RX ADMIN — CEFTAZIDIME 2 G: 2 INJECTION, POWDER, FOR SOLUTION INTRAVENOUS at 18:28

## 2021-12-23 RX ADMIN — INSULIN GLARGINE 9 UNITS: 100 INJECTION, SOLUTION SUBCUTANEOUS at 09:21

## 2021-12-23 RX ADMIN — NYSTATIN 1000000 UNITS: 100000 SUSPENSION ORAL at 20:11

## 2021-12-23 RX ADMIN — PANTOPRAZOLE SODIUM 40 MG: 40 TABLET, DELAYED RELEASE ORAL at 15:56

## 2021-12-23 RX ADMIN — MYCOPHENOLATE MOFETIL 1000 MG: 250 CAPSULE ORAL at 20:11

## 2021-12-23 RX ADMIN — CALCIUM CARBONATE 600 MG (1,500 MG)-VITAMIN D3 400 UNIT TABLET 1 TABLET: at 17:42

## 2021-12-23 RX ADMIN — SODIUM CHLORIDE 500 ML: 9 INJECTION, SOLUTION INTRAVENOUS at 09:52

## 2021-12-23 RX ADMIN — FLUTICASONE PROPIONATE 1 SPRAY: 50 SPRAY, METERED NASAL at 09:21

## 2021-12-23 RX ADMIN — MYCOPHENOLATE MOFETIL 1000 MG: 250 CAPSULE ORAL at 09:22

## 2021-12-23 RX ADMIN — ESCITALOPRAM OXALATE 20 MG: 20 TABLET ORAL at 09:24

## 2021-12-23 RX ADMIN — PREDNISONE 10 MG: 10 TABLET ORAL at 20:11

## 2021-12-23 ASSESSMENT — ACTIVITIES OF DAILY LIVING (ADL)
ADLS_ACUITY_SCORE: 22
ADLS_ACUITY_SCORE: 24
ADLS_ACUITY_SCORE: 22
ADLS_ACUITY_SCORE: 24
ADLS_ACUITY_SCORE: 22
ADLS_ACUITY_SCORE: 22
ADLS_ACUITY_SCORE: 24
ADLS_ACUITY_SCORE: 22
ADLS_ACUITY_SCORE: 24
ADLS_ACUITY_SCORE: 22

## 2021-12-23 NOTE — PLAN OF CARE
Cognition: Pt is AOx4 and makes needs known.  Pain: Denied pain. Has baseline tingling in feet and numbness in hands.  Skin: Has herpes lesion on lower lip and chin.   Bowel/bladder: Continent of bowel and bladder.  Transfer: A1 with walker and gait belt.   Vital signs: VSS. Denied SOB, chest pain.

## 2021-12-23 NOTE — PLAN OF CARE
Discharge Planner Post-Acute Rehab SLP:      Discharge Plan: [facility] with jarret , further SLP TBD     Precautions: aspiration precautions, sternal, fall     Current Status:  Communication: WFL.   Cognition: Mild-moderate cognitive impairments with deficits re: attention, exeuctive function, memory, and visuospatial skills, word finding.  Swallow: Regular, thin liquid. Medications to be given orally with thin liquids. Will continue to monitor.      Assessment:  Pt participated in higher level problem solving task using visual to solve and answer functional math problems. Pt reported 2/3 strategies learned from yesterday to aid completion and IND with task. Pt re introduced to strategies and to continue to implement through task.Pt overall completed task with 90% accuracy, min cues. Did benefit from intermittent supervision and redirection to material and include all targets within calculation. Excellent implementation of strategies to aid orientation to  all information. Task left incomplete, plan to review next session.      Other Barriers to Discharge (Family Training, etc):

## 2021-12-23 NOTE — PLAN OF CARE
Discharge Planner Post-Acute Rehab OT:      Discharge Plan: to [facility] with fiance     Precautions: sternal, fall     Current Status:  ADLs:  Mobility: SBA for in room mobility using walker  Grooming: SBA standing at the sink, chair  near by for intermittent rest breaks, Seated only w/ set up to manage fatigue.   Dressing: UB SBA, SBA for LB donning pants seated and standing to pull up, additional effort due to fatigue- pt may benefit from a reacher to decreased trunk bending.   Bathing: Min A w/shower chair  Toileting: SBA for transfer using walker, SBA for [place]-cares to follow precautions  IADLs: fiance will assist  Vision/Cognition: L visual deficit at baseline and new right eye blind spot. Pt scored 24/30 on the SLUMS indicating mild deficit     Assessment: Pt was motivated this AM session for OT. Pt completed ADLs mostly seated and toilet tsfe/ needs. Pt self pacing and taking breaks to manage his fatigue with increased awareness today after EC/WS education. Pt educated on EC strategies and seated tasks to manage his fatigue safely and pt was receptive to education. Pt can continue to incorporate EC strategies into his daily routine and work on UE calisthenics to increase upper respiratory conditioning and breathing.      Other Barriers to Discharge (DME, Family Training, etc): poor activity endurance

## 2021-12-23 NOTE — PLAN OF CARE
"Discharge Planner Post-Acute Rehab PT:      Discharge Plan: [facility] with JANUSZ wheatT, then home to [place], [place]     Precautions: Falls, dyspnea on exertion     Current Status:  Bed Mobility: Min A  Transfer: SBA with WW  Gait:  ft with WW  Stairs: 5 step ups with min A  Balance: Multiple LOB during session and rapid sitting, recommending CGA at all times  30 seconds STS from 20\" mat using hands on FWW 12/19/21 = 6, OMNI 3/10  TUG 12/19/21, CGA with FWW = 20 seconds, OMNI 7/10     Assessment: Able to complete 15 minutes continuous on NuStep today. Showing improved CP endurance and tolerance to exercise.     Other Barriers to Discharge (DME, Family Training, etc): Significant deconditioning  "

## 2021-12-23 NOTE — PLAN OF CARE
FOCUS/GOAL  Medical management, Mobility, Reinforcement of self-care/ADL, and Safety management    ASSESSMENT, INTERVENTIONS AND CONTINUING PLAN FOR GOAL:    A&O x 4. Calls appropriately. Transfers with CGA and FWW. Does have some dyspnea with exertion but declines the need for oxygen. Denies pain or dizziness. Regular diet, thin liquids, takes pills whole. Triple lumen PICC to LUE, IV abx infused this shift per orders. PEG tube dressing CDI, flushed with 200mL of water. Continent of bowel and bladder. Last BM 12/22. Discharge plans pending medical readiness.    Hodan Aguilar RN

## 2021-12-23 NOTE — PROGRESS NOTES
[facility]   Acute Rehabilitation Unit  Daily progress note    INTERVAL HISTORY  No acute events overnight, but reports not sleeping very well due to many thoughts going on.  He otherwise has no concerns or complaints today.  Dyspnea still present but continues to improve, and was able to do 15 minutes continuously on the NuStep.  Eating also improving, per RD note averaged 1929 calories over the past 3 days.  Will plan on removing feeding tube in the next 1-2 days.  Functionally, ambulating 140 ft with SBA and a WW, 5 step ups with Min A, but did have multiple LOB episodes with PT.      MEDICATIONS  Scheduled meds    acetylcysteine  2 mL Nebulization TID     amLODIPine  5 mg Oral Daily     calcium carbonate 600 mg-vitamin D 400 units  1 tablet Oral or Feeding Tube BID w/meals     cefTAZidime  2 g Intravenous Q8H     escitalopram  20 mg Oral or Feeding Tube Daily     fluconazole  400 mg Intravenous Q24H     fluticasone  1 spray Both Nostrils Daily     folic acid-vit B6-vit B12  1 tablet Oral Daily     furosemide  40 mg Oral Daily     heparin lock flush  5-20 mL Intracatheter Q24H     insulin aspart  1-7 Units Subcutaneous TID AC     insulin glargine  9 Units Subcutaneous QAM     levalbuterol  1.25 mg Nebulization TID     levothyroxine  25 mcg Oral Daily     magnesium oxide  800 mg Oral BID     multivitamin w/minerals  1 tablet Oral Daily     mycophenolate  1,000 mg Oral BID     nystatin  1,000,000 Units Swish & Swallow 4x Daily     pantoprazole  40 mg Oral BID AC     predniSONE  10 mg Oral or Feeding Tube BID     rosuvastatin  10 mg Oral or Feeding Tube Daily     sodium chloride (PF)  10-40 mL Intracatheter Q8H     sulfamethoxazole-trimethoprim  1 tablet Oral or Feeding Tube Daily     tacrolimus  2 mg Oral BID IS     warfarin ANTICOAGULANT  2 mg Oral ONCE at 18:00       PRN meds:  acetaminophen, albuterol, bisacodyl, dextrose, glucose **OR** dextrose **OR** glucagon, heparin  "lock flush, hydrOXYzine, lidocaine (viscous), magnesium hydroxide, melatonin, naloxone **OR** naloxone **OR** naloxone **OR** naloxone, ondansetron, - MEDICATION INSTRUCTIONS -, prochlorperazine, QUEtiapine, senna-docusate, sodium chloride (PF), sodium chloride (PF), traZODone, Warfarin Therapy Reminder, zinc-white petrolatum      PHYSICAL EXAM  /80   Pulse 100   Temp 97.3  F (36.3  C) (Oral)   Resp 20   Ht 1.646 m (5' 4.8\")   Wt 68.3 kg (150 lb 9.6 oz)   SpO2 98%   BMI 25.22 kg/m       Gen: Awake, coooperative NAD  HEENT: Trach site now healed  Cardio: RRR, S1+S2, no m/r/g  Pulm: Non-labored breathing at rest, with lungs clear diminished at bases.  Abd: Soft, non-tender to palpation, bowel sounds present. PEG in place   Ext: No edema in lower or upper extremities b/l, no calf tenderness  Neuro/MSK: alert, oriented, answers questions appropriately, follows commands.      LABS  Recent Labs   Lab 12/23/21  1204 12/23/21  0815 12/23/21  0714 12/22/21  0753 12/22/21  0724 12/21/21  0808 12/21/21  0659 12/20/21  0824 12/20/21  0752 12/17/21  0819 12/17/21  0759   WBC  --   --  8.8  --   --   --   --   --  9.3  --  11.6*   HGB  --   --  10.3*  --   --   --   --   --  9.5*  --  9.9*   MCV  --   --  89  --   --   --   --   --  91  --  90   PLT  --   --  187  --   --   --   --   --  165  --  171   INR  --   --  2.43*  --  2.36*  --  2.30*  --  2.22*   < > 2.77*   NA  --   --  141  --   --   --   --   --  142  --   --    POTASSIUM  --   --  4.5  --   --   --   --   --  4.2  --   --    CHLORIDE  --   --  107  --   --   --   --   --  108  --   --    CO2  --   --  26  --   --   --   --   --  27  --   --    BUN  --   --  29  --   --   --   --   --  30  --   --    CR  --   --  0.89  --   --   --   --   --  0.82  --   --    ANIONGAP  --   --  8  --   --   --   --   --  7  --   --    ERIK  --   --  9.2  --   --   --   --   --  9.2  --   --    * 121* 130*   < >  --    < >  --    < > 129*   < >  --    ALBUMIN  --   " --  2.8*  --   --   --   --   --   --   --   --    PROTTOTAL  --   --  6.0*  --   --   --   --   --   --   --   --    BILITOTAL  --   --  0.2  --   --   --   --   --   --   --   --    ALKPHOS  --   --  91  --   --   --   --   --   --   --   --    ALT  --   --  31  --   --   --   --   --   --   --   --    AST  --   --  22  --   --   --   --   --   --   --   --     < > = values in this interval not displayed.     No results found for this or any previous visit (from the past 24 hour(s)).    ASSESSMENT AND PLAN    Edson Thornton is a 56 year old right hand dominant male with a hx of NSIP/ILD associated with rheumatoid lung disease, hypertension, hyperlipidemia, RA who initially presented 9/5/2021 with acute on chronic respiratory failure for transplant work-up s/p bilateral lung transplant (10/16/21) with prolonged hospitalization associated with multifocal bilateral acute/subacute ischemic infarcts of bilateral frontal, occipital lobes.  Postoperative course complicated by prolonged ventilator wean s/p trach and PEG placement (10/29), encephalopathy, new Afib with RVR, BRENNAN, candidemia/Candida empyema, and brief bradycardic arrest with subsequent hypotension in the setting of GI bleed.  [facility] and was decannulated 12/8/2021.  He is currently stable on room air at admission to acute rehab.  He presents with right upper extremity weakness, bilateral dysmetria/tremors, acute on chronic severe deconditioning, dyspnea on exertion, and impairment of ADLs and gait.     Admission to acute inpatient rehab 12/13/2021  .    Impairment group code: Stroke Ischemic 01.3 Bilateral Involvement: embolic CVA affecting B cerebral hemispheres and L cerebellum, as well as s/p bilateral lung transplantation     1. PT, OT 90 minutes of each on a daily basis, in addition to rehab nursing and close management of physiatrist.   2. Impairment of ADLs/IADLs:  OT for 60 min daily to work on upper and  lower body self care, dressing, toileting, bathing, energy conservation techniques with use of ADs as needed.   3. Impairment of mobility:   PT for 60 min daily to work on gait exercises, strengthening, endurance buildup, transfers with use of walker as needed.   4. Impairment of cognition:  SLP for 60 minutes for cognitive evaluation and treatment strategies for higher level cognitive deficits, and memory impairment.  5. Rehab RN to administer medication, patient education on medication taking, VS monitoring, bowel regimen, glucose monitoring and wound care/surgical wound dressing changes and monitoring.   6. Medical Conditions - Hospitalist team consulted, appreciate assistance     Neuro/Psych  #Multifocal acute to subacute ischemic stroke, etiology likely embolic vs perioperative  Initially noted 10/22 and 11/6 with stroke code called for change in exam.  MRI brain 10/23 with infarcts noted in both cerebral hemispheres (R frontal, L corona radiata, bilateral occipital), left cerebellum, presumed associated with new Afib vs perioperative. Bilateral upper extremity paresis (R>L), suspicion for some cortical blindness  - Stroke risk factor management:              -Warfarin anticoagulation for Afib, appreciate pharmacy assistance with dosing              -BP goals: SBP<140              -continue Rosuvastatin 10 mg qday              -Not smoking              -Hgb A1c: 6.6%     #Pain  -Acetaminophen 975mg q8h PRN  -Lidocaine patch  -Menthol patch     #Encephalopathy  #Sleep  -Delirium precautions  -Now completed Seroquel weaning (12/22)  -Melatonin 10mg qhs  -Trazodone 25mg prn     #Mood  #History of anxiety and depression  - PTA Lexapro increased to 10 mg while in acute hospital, increased to 20mg 12/14  -Hydroxyzine as needed         Pulmonary  #ILD, NISP, s/p bilateral lung transplant (10/16/2021)  #Acute on chronic respiratory failure s/p tracheostomy (10/29/21) and decannulation (12/8/21)  #Bilateral Pleural  Effusions  -Sternal precautions until 12 wks post op (1/8/2022)  -Immunosuppression:              -Continue tacrolimus  goal 8-12              -Continue MMF 1000mg BID              -Continue prednisone 10mg BID  -Monitoring lab/imaging              -DSA q2wks              -Monthly Coccidioides Ag per transplant completed 12/17              -CMV, EBV per transplant team              -CXR, CBC, BMP, Tac level M/Th  -Continue Levalbuterol and mucomyst nebs QID  -Aggressive pulmonary toilet and chest PT QID  -Continue Lasix 40mg qday     #SALTY  -PTA CPAP  -Trach site now healed.  IM discussed with pulmonology, no indication for CPAP or BiPAP at this time.     ID  #Immunosuppression Prophylaxsis   -Bactrim x6 months  -Nystatin x6 months     #L lung cavitary lesion  #Hospital-acquired pneumonia with Klebsiella pneumoniae, Pseudomonas fluorescens/putida  Suspected aspiration versus pseudomonal vs Klebsiella pneumonia related to abscess.  CT  11/22 showing new cavitary lesion in left lung base, multifocal bilateral upper lobe groundglass opacity, and new 1.8 cm fluid collection with surrounding fat stranding in left axilla, decreased bilateral loculated pleural effusions.  QuantiFERON gold indeterminate, negative tuberculin skin test on multiple occurrences.  Underwent BAL on 11/22 with cultures growing Klebsiella and Pseudomonas fluorescens/putida, resistant to meropenem.  ID was consulted and feels this is likely fungal.  B-D glucan positive with known candidemia.  Cocci antigen in urine negative, serum histoplasmosis negative, CRAG negative  -Follow-up chest completed CT 12/20   1. Overall decreased (since 11/22/2021 chest CT) size and number of bilateral infectious/inflammatory groundglass opacities. 2. Decreased but persistent left lower lobe cavitary lesion.  -Followed up with Dr. Abebe 12/21, recs appreciated   -Continue fluconazole for two additional weeks (until 1/18/2022) to complete 8 weeks of therapy for Genevieve  empyema.    -Continue ceftazidime for four additional weeks until 1/18/2022 however, when ready for discharge he may be discharged on PO levaquin 500 mg daily.    -Repeat CT chest without contrast 1/18/2022 before his appointment with Dr. Abebe (1/18/22 at 6:00pm via virtual visit)   -No need to check monthly Coccidioides AG.      #Invasive Candida albicans candidiasis  Positive Candida blood cultures 10/20, 10/22.  Fungitell positive (399).  TC 10/23 without evidence of endocarditis. Ophthalmic consulted 10/24, benign funduscopic exam.  Candida empyema 10/25.  Repeated pleural effusion fungal cultures and fungal/bacterial blood cultures have been without growth since 11/18.  Initially on micafungin (10/22-10/27) before transition to fluconazole IV  -Continue fluconazole for two additional weeks (until 1/18/2022) to complete 8 weeks of therapy for Genevieve empyema per ID follow up recs 12/21     #hx of HSV  Chronic intermittent infection pretransplant, with recent HSV infection while in acute hospital (crusted lesions along left jaw) s/p 10d treatment until 10/9.     Cardiovascular  #Afib  Initially noted 10/18, converted to NSR with amio ggt. Metoprolol and amiodarone discontinued due to bradycardia. INR: 2.43 12/22  -Warfarin as dosed by pharmacy     #Hypertension  -continue PTA amlodipine 5 mg daily  -also on lasix 40 mg daily      Heme  #Leukocytosis  New leukocytosis to 13.2 on 12/14 -Now returned to normal limits and stable at 8.8 on 12/23      #R subclavian DVT  -Warfarin anticoagulation, pharmacy to dose     #Hypogammaglobulinemia  S/p IVIG  -Repeat IgG 12/15 365  -Repeat ~12/30     Endocrine  #Type 2 diabetes  #Steroid-induced hyperglycemia  -Endocrine consulted while in acute hospital.  Tube feeds on hold starting 12/20.   -  Continue Lantus 9 units daily, sliding scale insulin and carbohydrate coverage- titrate as indicated.      #Hypothyroidism  TSH 3.17 on 11/19  -Continue levothyroxine 25 mcg  daily     Renal  #BRENNAN, resolved  Cr 0.89 12/20, stable  -Monitor intermittently     MSK/Rheum  #Rheumatoid arthritis  Previously treated with rituximab.  Rheumatology familiar and consulted early in admission.  -Steroids as above     GI/FEN  #Severe malnutrition  Patient currently has PEG in place.  TF held starting 12/20. Averaging nearly 2000 cals/day.  Anticipate removing PEG/J tube in the next 1-2 days.  -Continue multivitamin, folate, B6, B12 supplements  -Dietician assistance appreciated  -Encourage p.o. intake     #hx of GI Bleed  Patient with progressive hypotension and CODE BLUE for bradycardia/asystole associated with GI bleed in the setting of anticoagulation, requiring massive transfusion protocol on 10/22.  EGD on 11/2 with clotted blood in stomach, adherent clot near PEG site that was clipped without active bleeding.  Most recent EGD 11/5 with ulcer noted near the PEG bumper site, gastritis, suction marks from the G-tube.  GJ tube was exchanged 11/9 by GI. Hgb 10.3 12/23  -Monitor hemoglobin intermittently.  -Continue PPI BID      Bowel/Bladder  Bowel, continent  Constipation  - Senna-docusate 1-2 tablets BID  - Miralax QDAY PRN  - Bisacodyl suppository 10mg QDAY PRN     Bladder, continent     1. Adjustment to disability:  Clinical psychology to eval and treat as indicated  2. FEN: Regular with thin liquids, holding tube feeds  3. Bowel: continent, meds as above  4. Bladder: continent  5. DVT Prophylaxis: warfarin  6. GI Prophylaxis: PPI  7. Code: full  8. Disposition: Local housing - [facility]  9. ELOS:  2-3 weeks  10. Rehab prognosis:  good  9. Follow up Appointments on Discharge:                -Neurology 1-2 months after discharge               -Outpatient cardiac/pulmonary Rehab               -Transplant Coordinator               -AISHA Coronado MD  Department of Rehabilitation Medicine        Time Spent on this Encounter   I, Lan Coronado, spent a total of 25 minutes face-to-face or  managing the care of Edson Thornton. Over 50% of my time on the unit was spent counseling the patient and coordinating care. See note for details.

## 2021-12-23 NOTE — PROGRESS NOTES
Nutrition Brief    Calorie Counts    12/20: 1799 kcal and 65 g protein (3 meals - breakfast from , lunch was just brook crackers and juice per RN notes, and dinner outside meal of taco [employer])  12/21: 2182 kcal and 86 g protein (2 meals, 2 supplements)  12/22: 1807 kcal and 67 g protein (1 meals, 1 supplements)    3 day average PO intake = 1929 kcal (95% minimum energy needs) and 73 g protein (91% minimum protein needs)    PO intake improved / adequate with cycled Jtube feedings on hold since 12/20.       Interventions:  1. Discontinue TF order / continue water flushes for tube patency  2. Discontinue calorie counts  3. Continue to encourage po / intake of ONS (Nepro)    Zenia Pearce RD, LD  Unit Pager: [phone]

## 2021-12-23 NOTE — PROGRESS NOTES
Pulmonary Medicine  Cystic Fibrosis - Lung Transplant Team  Progress Note  2021       Patient: Edson Thornton  MRN: [id number]  : 1965 (age 56 year old)  Transplant: 10/16/2021 (Lung), POD#68  Admission date: 2021    Assessment & Plan:     Edson Thornton is a 56 year old male with a PMH significant for NSIP/ILD, bronchiectasis, moderate PH, RA, SALTY, chronic HSV infection, hypogammaglobulinemia, steroid-induced diabetes, hypothyroidism, PFO, HTN, HLD, duodenal anomaly, anxiety, and depression.  Admitted on 21 from OSH for acute on chronic respiratory failure 2/2 ILD exacerbation, now s/p BSLT on 10/16/21.  Prolonged vent wean s/p trach (10/29-) and PEG/J tube (10/29).  Post-op course also complicated by encephalopathy and diffuse weakness, acute to subacute CVA, afib with RVR, BRENNAN, GI bleed, Candidemia/Candida empyema, and anxiety.  Code called  for bradycardia/asystole, progressive hypotension, found to have significant GI bleed, EGD with adherent clot near PEG tube site that was clipped.  Discharged to ARU  for ongoing rehabilitation.      Recommendations:   - CXR ordered   - Tacrolimus level therapeutic although 13h level, dose decreased, repeat level ordered   - Prednisone taper ordered 22  - CMV and EBV due 1/15/22 (not yet ordered)  - DSA ordered   - Continue current antimicrobials per transplant ID, follow up with chest CT prior 22  - IgG ordered     S/p BSLT for ILD:  Acute hypoxic respiratory failure, Resolved:   Prolonged vent weaning s/p trach, Resolved:  Bilateral pleural effusions: Explant pathology with NSIP, no malignancy.  Prolonged vent wean s/p trach and PEG/J tube placement with thoracic surgery 10/29.  Code called 2 for bradycardia/asystole (required 1 round of CPR, no medications) then progressive hypotension, GI bleed as below.  S/p left chest tube placement (11/3-) for pleural effusion/empyem s/p lytics -  (CXR showing trapped left lung), right thoracentesis 11/27 (cultures negative).  Chest CT (11/22) with new LLL cavitary lesion with multifocal GGO in BUL, new 1.8 cm fluid collection with surrounding fat stranding in the left axilla, decreased bilateral loculated pleural effusions, and similar small pericardial effusion.  Hypoxia resolved 12/1, trach decannulated 12/8.  CMV/EBV negative 12/15.  Chest CT 12/20 with overall decreased size and number of bilateral infectious/inflammatory GGO, decreased but persistent LLL cavitary lesion, and decreased bilateral pleural loculated effusions.  Remains on RA, ongoing MYERS, minimal cough/sputum mostly around time of neb treatments.  - Nebs: levalbuterol and Mucomyst TID  - Encourage frequent use of IS and Aerobika  - Volume management per primary team  - CXR Mondays (ordered 12/27)  - CMP, Mg++, CBC qMTh minimally     Immunosuppression: s/p induction therapy with basiliximab 10/16 (and high dose IV steroid) and 10/20  - Tacrolimus 2.5 mg qAM / 2 mg qPM (decreased 12/11).  Goal level 8-12.  Level 12/23 therapeutic at 11.9 although 13h, dose decreased to 2 mg BID, repeat level 12/27 (ordered).  - MMF 1000 mg BID (decreased 11/2 given GI bleed, AZA to be avoided given TPMT)  - Prednisone 10 mg BID, next taper due 1/2/22 (ordered)  Date AM dose (mg) PM dose (mg)   12/5/21 10 10   1/2/22 10 7.5   1/30/22 7.5 7.5   2/27/22 7.5 5   3/27/22 5 5   4/24/22 5 2.5      Prophylaxis:   - Bactrim for PJP ppx (held 11/2-11/5 d/t BRENNAN)  - Nystatin for oral candidiasis ppx, 6 month course  - See below for serologies and viral ppx:    Donor Recipient Intervention   CMV status Negative Negative None, CMV monthly   EBV status Positive Positive None, EBV monthly   HSV status N/A Positive S/p acyclovir POD #1-30 (recent infection history pre-txp)      Positive cross match:   Positive DSA: Note that he received two doses of rituximab in June, which is likely contributing to cross match  result.  DSA newly positive 11/29 with DQB5 (mfi 2522), decreased (mfi 1873) on 12/6.  DSA on 12/12, mfi decreased further to 1703.    - Repeat DSA every two weeks, next on 12/27 (ordered)     ID: Concern for possible Strongyloides exposure pre-transplant s/p ivermectin x1 dose (9/17).  Donor and recipient cultures NGTD.  S/p IV ceftazidime/vancomycin for 48h per protocol and additional empiric ceftazidime 10/19-10/23 given recurrent fevers as below.  Cryptococcal Ag negative 10/29.  Blood mixed in sputum 12/3, none reported recently.  Coccidiodes blood Ag last negative 12/17.      Klebsiella pneumoniae:  Pseudomonas fluorescens/putida:   LLL cavity: Klebsiella initially noted trach sputum culture 11/10. Started on ceftriaxone 11/11, transitioned to ertapenem 11/12-11/13, and back to ceftriaxone 11/14.  Bronch culture 11/12 with Klebsiella and Pseudomonas fluorescens/putida (R-meropenem).  Chest CT 11/22 with LLL cavity as above, BAL with Klebsiella pneumoniae. Histo Ag 11/22, 11/23 and Blast Ag 11/22 negative. S/p Zosyn (11/15-11/23) and levofloxacin (11/23-12/7).  Transplant ID evaluated 12/21 with recommendation to continue ceftazidime (po levofloxacin at time of discharge) and fluconazole (as below) until 1/18/22.  - ABX: ceftazidime (11/23-1/18) per transplant ID  - Transplant ID follow up scheduled 1/18/22 with repeat chest CT prior      Disseminated Candida: Noted on blood cultures 10/20 and 10/22.  BDG fungitell positive (399) on 10/20.  Respiratory cultures with persistent Candida albicans.  TC 10/23 without evidence of endocarditis.  Ophthalmology consult 10/24 with benign dilated fundoscopic exam.  Candida empyema also noted 10/25, chest tubes inadvertently removed by CVTS 10/28, left chest tube replaced by IR 11/3 as above with ongoing Candida on cultures (11/3, 11/18).  BDG fungitell positive (>500) and Aspergillus galactomannan negative 11/22.  - Fluconazole (10/26-1/18) per transplant ID      HSV:  Chronic intermittent active infection pre-transplant with recent HSV infection: crusted lesions throughout left side of jaw, s/p 10 day treatment course of ACV through 10/9.  HSV PCR blood negative 10/17.  S/p ACV ppx as above.     Hypogammaglobulinemia: IgG previously low at 364 (9/7).  Noted at 265 at time of transplant, s/p IVIG 10/21, repeat IgG (11/17) 198, s/p IVIG (with premedication) 11/18.  Repeat IVIG on 12/15 of 365.  IVIG indicated (and ordered) per protocol, but was told ARU is unable to administer IVIG and patient is unable to leave to receive it in an infusion center.  - Repeat IgG 12/30 (ordered)      SALTY: Noted pre-transplant.  Home CPAP 6-12 cm H2O.  - Consider repeat sleep study as OP, revisit need for BiPAP/CPAP if change in clinical course     Hypomagnesemia: Suppressed reabsorption 2/2 CNI.  Mg 1.6 on 12/23.   - Mag-Ox 800 mg BID (increased 12/20)    We appreciate the excellent care provided by the ARU team.  Recommendations communicated via telephone and this note.  Will continue to follow along closely, please do not hesitate to call with any questions or concerns.    Patient discussed with Dr. Wells.    Keyla Rice PA-C  Inpatient PRINCESS  Pulmonary CF/Transplant     Subjective & Interval History:     Remains on RA, ongoing MYERS.  Exercise tolerance slowly improving.  Cough mostly productive in the morning and following neb treatments, unchanged.  Appetite remains diminished although caloric intake improving with holding TF.      Review of Systems:     C: No fever, no chills  INTEGUMENTARY/SKIN: No rash or obvious new lesions  ENT/MOUTH: No sore throat, no sinus pain, no nasal congestion or drainage  RESP: See interval history  CV: No chest pain, no palpitations, no peripheral edema  GI: No nausea, no vomiting, no change in stools, no reflux symptoms  : No dysuria  MUSCULOSKELETAL: No myalgias, no arthralgias  ENDOCRINE: Blood sugars with adequate control  NEURO: No headache, +  "intermittent numbness/tingling to feet and hands, + tremors  PSYCHIATRIC: Mood stable    Physical Exam:     Vital signs:  Temp: 97.3  F (36.3  C) Temp src: Oral BP: 126/80 Pulse: 98   Resp: 20 SpO2: 98 % O2 Device: None (Room air)   Height: 164.6 cm (5' 4.8\") Weight: 68.3 kg (150 lb 9.6 oz)  I/O:     Intake/Output Summary (Last 24 hours) at 12/23/2021 1331  Last data filed at 12/23/2021 0954  Gross per 24 hour   Intake 760 ml   Output 1200 ml   Net -440 ml     Constitutional: Sitting up in bed, in no apparent distress.   HEENT: Eyes with pink conjunctivae, anicteric.  Oral mucosa moist without lesions.   PULM: Mildly diminished air flow to bases bilaterally.  Bibasilar crackles.  No rhonchi, no wheezes.  Non-labored breathing on RA.  CV: Normal S1 and S2.  RRR.  No murmur, gallop, or rub.  No peripheral edema.   ABD: NABS, soft, nondistended.    MSK: Moves all extremities.  + muscle wasting.   NEURO: Alert, conversant.   SKIN: Warm, dry.  No rash on limited exam.   PSYCH: Mood stable, calm.     Lines, Drains, and Devices:  PICC Triple Lumen 11/04/21 Left Basilic Access. PICC okay to use. (Active)   Site Assessment WDL 12/23/21 0400   External Cath Length (cm) 4 cm 12/19/21 1700   Extremity Circumference (cm) 28 cm 11/28/21 1038   Dressing Intervention New dressing 12/19/21 1630   Dressing Change Due 12/26/21 12/19/21 1630   PICC Comment CDI 12/13/21 0000   Mosley - Status saline locked 12/23/21 0400   Mosley - Cap Change Due 12/14/21 12/13/21 0000   Red - Status saline locked 12/23/21 0400   Red - Cap Change Due 12/14/21 12/13/21 0000   White - Status saline locked 12/23/21 0400   White - Cap Change Due 12/14/21 12/13/21 0000   Extravasation? Yes 12/19/21 1630   Line Necessity Yes, meets criteria 12/19/21 1630   Number of days: 49     Data:     LABS    CMP:   Recent Labs   Lab 12/23/21  1204 12/23/21  0815 12/23/21  0714 12/23/21  0213 12/20/21  0824 12/20/21  0752   NA  --   --  141  --   --  142   POTASSIUM  --   --  " 4.5  --   --  4.2   CHLORIDE  --   --  107  --   --  108   CO2  --   --  26  --   --  27   ANIONGAP  --   --  8  --   --  7   * 121* 130* 131*   < > 129*   BUN  --   --  29  --   --  30   CR  --   --  0.89  --   --  0.82   GFRESTIMATED  --   --  >90  --   --  >90   ERIK  --   --  9.2  --   --  9.2   MAG  --   --  1.6  --   --  1.5*   PROTTOTAL  --   --  6.0*  --   --   --    ALBUMIN  --   --  2.8*  --   --   --    BILITOTAL  --   --  0.2  --   --   --    ALKPHOS  --   --  91  --   --   --    AST  --   --  22  --   --   --    ALT  --   --  31  --   --   --     < > = values in this interval not displayed.     CBC:   Recent Labs   Lab 12/23/21  0714 12/20/21  0752 12/17/21  0759 12/16/21  1611   WBC 8.8 9.3 11.6* 15.8*   RBC 3.77* 3.45* 3.48* 3.79*   HGB 10.3* 9.5* 9.9* 10.6*   HCT 33.5* 31.4* 31.2* 33.7*   MCV 89 91 90 89   MCH 27.3 27.5 28.4 28.0   MCHC 30.7* 30.3* 31.7 31.5   RDW 14.1 14.1 14.0 14.1    165 171 199       INR:   Recent Labs   Lab 12/23/21  0714 12/22/21  0724 12/21/21  0659 12/20/21  0752   INR 2.43* 2.36* 2.30* 2.22*       Glucose:   Recent Labs   Lab 12/23/21  1204 12/23/21  0815 12/23/21  0714 12/23/21  0213 12/22/21  2114 12/22/21  1652   * 121* 130* 131* 113* 94       Blood Gas: No lab results found in last 7 days.    Culture Data No results for input(s): CULT in the last 168 hours.    Virology Data:   Lab Results   Component Value Date    FLUAH1 Negative 11/22/2021    FLUAH3 Negative 11/22/2021    IG1958 Negative 11/22/2021    IFLUB Negative 11/22/2021    RSVA Negative 11/22/2021    RSVB Negative 11/22/2021    PIV1 Negative 11/22/2021    PIV2 Negative 11/22/2021    PIV3 Negative 11/22/2021    HMPV Negative 11/22/2021    HRVS Negative 11/22/2021    ADVBE Negative 11/22/2021    ADVC Negative 11/22/2021    ADVC Negative 11/12/2021    ADVC Negative 10/17/2021       Historical CMV results (last 3 of prior testing):  Lab Results   Component Value Date    CMVQNT Not Detected  12/15/2021    CMVQNT Not Detected 11/22/2021    CMVQNT Not Detected 11/16/2021     No results found for: CMVLOG    Urine Studies    Recent Labs   Lab Test 11/01/21  1336 10/25/21  1507   URINEPH 5.5 5.5   NITRITE Negative Negative   LEUKEST Negative Negative   WBCU 0 2       Most Recent B[facility]ze Pulmonary Function Testing (FVC/FEV1 only)  No results found for: 20002  No results found for: 20003  No results found for: 20015  No results found for: 20016    IMAGING    No results found for this or any previous visit (from the past 48 hour(s)).

## 2021-12-23 NOTE — PROGRESS NOTES
[facility]    Progress Note - TCU Service        Date of Admission:  12/13/2021    Assessment & Plan             Mr. Thornton is a 57 yo M with PMHx of NSIP/ILD 2/2 Rheumatoid lung disease, s/p bilateral lung transplant, failure to wean off the ventilator, s/p tracheostomy and PEG tube insertion, rheumatoid arthritis, bronchiectasis, pulmonary HTN, SALTY, essential HTN, HLD, PLD, hypogammaglobinemia, duodenal anomaly, anxiety, and depression admitted to [facility] on 12/13/2021 for ongoing rehabilitation s/p [facility] hospitalization from 09/05/2021 to 12/13/2021.    Today's changes: 12/21/21  No new issues.   Continue current plan of care  No current indication for CPAP or BiPAP, consider sleep study as outpatient per Pulmonary       # ILD and NSIP s/p BSLT on 10/16/2021  # Acute on chronic hypoxic respiratory failure s/p tracheostomy on 10/29/21 and decannulation on 12/8/2021  # Bilateral pleural effusions, improved.  - Transplant pulmonology following. IS dosing per Pulmonary team.     Prophylaxis:   - Bactrim for PJP ppx (held 11/2-11/5 d/t BRENNAN)  - Nystatin for oral candidiasis ppx, 6 month course  - Monthly EBV, CMV labs (last sent on 12/15/21, negative for both)     -Immunosuppression: s/p induction therapy with basiliximab 10/16 (and high dose IV steroid) and 10/20 Continue tacrolimus 2.5 mg qAM / 2 mg qPM (decreased 12/11).  Goal level 8-12. 12/16: 8.6.  [id number] mg BID (decreased 11/2 given GI bleed, AZA to be avoided given TPMT) , prednisone 10 mg BID, next taper due 1/2/22   - qM/Th CBC/BMP tacrolimus levels, fluconazole continued per ID     Positive cross match:   Positive DSA: Note that he received two doses of rituximab in June, which is likely contributing to cross match result.  DSA newly positive 11/29 with DQB5 (mfi 2522), decreased (mfi 1873) on 12/6. DSA on 12/12, decreased further to 1703.   - Repeat DSA (12/26) every two weeks     Others:   - Levalbuterol and  mucomyst TID and pulm toilet and chest PT BID  - PT/OT/SLP -@ PM&R team.   - Continue 40 mg of Lasix daily  -  Oxycodone discontinued   - Acetaminophen PRN 975mg tid     # Left lung cavitary lesion   # Klebsiella pneumoniae and Pseudomonas fluorescens/putida HAP  #Candida empyema  aspiration vs pseudomonal vs Klebsiella pneumonia related abscess. Indeterminate Quant Gold, but negative tuberculin skin tests on mulitple occurences. S/p BAL on 11/22.Cocci antigen in urine negative, serum, BAL histoplasmosis negative, CRAG negative, respiratory viral panel (-), TB (-), candida growth from bal culture. Transplant ID consulted, saw the patient on 12/21.      -  Continue IV Ceftazdime 2 grams Q8H (until 1/18/22), switch to levofloxacin 500mg PO after discharge from TCU  - Discontinue monthly coccidioides Ag  - Continue IV fluconazole till 1/18/22  - Follow up CT chest on 1/18/22 (ordered)  - Follow up with Dr. Abebe on 1/18/2022 @6:00 PM in a virtual visit.      # Hypogammaglobinemia:   IgG previously low at 364 (9/7).  Noted at 265 at time of transplant, s/p IVIG 10/21, repeat IgG (11/17) 198, s/p IVIG (with premedication) 11/18. Repeat IVIG on 12/15 of 365;   Per Pulm: IVIG indicated but ARU  unable to administer IVIG and patient is unable to leave to receive it in an infusion center.  - Repeat IgG ~ 12/30  - Further per Transplant team.     # Encephalopathy, resolved  # Difficulty sleeping  Likely multi-factorial in the setting of stroke, prolonged critical illness.  - Ct Seroquel 25 mg TID prn and 50 mg at bedtime. Wean down as tolerated.   - Melatonin 10 mg at bedtime  - Trazodone 25 mg prn.      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 MRI brain on 10/23 with multifocal subacute infarct within both cerebral hemispheres and left cerebellum. Presumed embolic with afib hx, identified while inpatient. He is currently able to  ambulate with relative ease.     - Continue warfarin per pharmacy protocol      # Right subclavian DVT   Diagnosed on 11/4 on ultrasound.  - Continue warfarin per pharmacy protocol  - INR therapeutic.      # DM2 with steroid induced hyperglycemia  Hemoglobin A1c 6.6 pre-operatively. Endocrine recently consulted for steroids induced hyperglycemia.      -Continue glargine 9IU qAM     # Severe malnutrition in the context of acute on chronic illness (resolving)  Patient currently has a PEG J-tube and is getting the Jorde of nutrition through cycle tube feeds.  - Multivitamin, folic acid, B6, B12 supplements   - TF now discontinued, only water flushes for patency. Patient has adequate caloric intake PO, PEG J-tube to be removed     # Anxiety and depression   Psychiatry reconsulted and after discussion suggest increasing lexapro. Will discuss with patient regarding starting ritalin in the AM for motivation.  -Ct Lexapro 20 mg daily (increased dose)  -As needed hydroxyzine.     # Concern for chipped tooth  # Poor dental hygiene  Patient noted he feels like he may have chipped his tooth or has a loose tooth after eating guevara. It is not painful.   - Dental hygiene consult placed and chipped tooth stable_ rec oral hygiene cares.     # Rheumatoid arthritis- Dx 5/2021 with + CCP antibody and RF. Previously treated with rituximab. Rheum consulted early in admission. On steroids as noted above.      # SALTY - Noted pre-transplant. Home CPAP 6-12 cm H2O. Patient currently sleeps without any issues, denies night awakenings or snoring. Discussed with Transplant Pulm, no current indication for CPAP or BiPAP.  - Consider sleep study as outpatient     # HTN  - Continue PTA amlodipine.   Stable.      # Hypothyroidism - TSH 3.17 on 11/19/21.   -Continue PTA levothyroxine 25 mcg daily.     # Recurrent GIB - hgb drop on 10/22 s/p 2 unit pRBC transfusion with EGD on 10/23 with NJ/OG tube trauma with scant oozing. Patient then developed  progressive hypotension and ultimately CODE BLUE for GIB in setting of anticoagulation with heparin drip, s/p massive transfusion protocol. EGD on 11/2 with large amount of clotted blood in stomach and area of raised mucosa with small adherent clot near PEG tube site that was clipped, no active bleeding.  Maroon stools 11/3, repeat EGD with extensive old blood in stomach, no active bleeding, small nodular area with prior clips clipped again.  Most recent EGD 11/5 with ulcer noted at PEG tube bumper site, gastritis, and suction marks from G tube. TF noted in G tube drainage bag 11/7, AXR with GJ tube tip projecting over proximal duodenum. GJ tube exchanged 11/9 by GI.     - PO PPI BID    .   # Hypomagnesemia: Suppressed reabsorption 2/2 CNI.  Requiring intermittent IV and PO replacement.  - Mag-Ox 800 mg qAM / 400 mg qPM (increased 12/8)     # Tremors: Potentially tacrolimus related. Ct to monitor.   Improving.      Diet: Room Service  Regular Diet Adult Thin Liquids (level 0)  Snacks/Supplements Adult: Nepro Oral Supplement; With Meals    DVT Prophylaxis: Warfarin  Watson Catheter: Not present  Fluids: None  Central Lines: None  Code Status: Full Code      Disposition Plan   Expected Discharge: 12/30/2021     Anticipated discharge location:  Awaiting care coordination huddle   Delays: None       The patient's care was discussed with the Attending Physician, Dr. Quintero.    Sherri Layne MD  TCU Service  [facility]  Securely message with the Vocera Web Console (learn more here)  Text page via [employer] Paging/Directory        Clinically Significant Risk Factors Present on Admission                    ______________________________________________________________________    Interval History   No acute events overnight. Yesterday's notes reviewed AM. Patient in a good mood, denies any pain or difficulty breathing at rest. Reports his bowel movements to be further improved.      Data reviewed today: I reviewed all medications, new labs and imaging results over the last 24 hours. I personally reviewed no images or EKG's today.    Physical Exam   Vital Signs: Temp: 97.3  F (36.3  C) Temp src: Oral BP: 126/80 Pulse: 98   Resp: 20 SpO2: 98 % O2 Device: None (Room air)    Weight: 150 lbs 9.6 oz  General Appearance:  AAOx4, sitting on chair, NAD, appears comfortable  Respiratory: CTAB, no crackles, rales or wheezing, patient on room air  Cardiovascular: S1, S2, rrr, no m/r/g  GI: Soft, non-tender, non distended. bs (+), PEG Tube in place  Skin: MEMO (-)    Data   Recent Labs   Lab 12/23/21  1204 12/23/21  0815 12/23/21  0714 12/22/21  0753 12/22/21  0724 12/21/21  0808 12/21/21  0659 12/20/21  0824 12/20/21  0752 12/17/21  0819 12/17/21  0759   WBC  --   --  8.8  --   --   --   --   --  9.3  --  11.6*   HGB  --   --  10.3*  --   --   --   --   --  9.5*  --  9.9*   MCV  --   --  89  --   --   --   --   --  91  --  90   PLT  --   --  187  --   --   --   --   --  165  --  171   INR  --   --  2.43*  --  2.36*  --  2.30*  --  2.22*   < > 2.77*   NA  --   --  141  --   --   --   --   --  142  --   --    POTASSIUM  --   --  4.5  --   --   --   --   --  4.2  --   --    CHLORIDE  --   --  107  --   --   --   --   --  108  --   --    CO2  --   --  26  --   --   --   --   --  27  --   --    BUN  --   --  29  --   --   --   --   --  30  --   --    CR  --   --  0.89  --   --   --   --   --  0.82  --   --    ANIONGAP  --   --  8  --   --   --   --   --  7  --   --    ERIK  --   --  9.2  --   --   --   --   --  9.2  --   --    * 121* 130*   < >  --    < >  --    < > 129*   < >  --    ALBUMIN  --   --  2.8*  --   --   --   --   --   --   --   --    PROTTOTAL  --   --  6.0*  --   --   --   --   --   --   --   --    BILITOTAL  --   --  0.2  --   --   --   --   --   --   --   --    ALKPHOS  --   --  91  --   --   --   --   --   --   --   --    ALT  --   --  31  --   --   --   --   --   --   --   --    AST   --   --  22  --   --   --   --   --   --   --   --     < > = values in this interval not displayed.

## 2021-12-23 NOTE — PLAN OF CARE
Alert and oriented. Able to make needs known. Call light in reach. Offers no C/O pain/discomfort so far this shift. MYERS.  Assist of 1, gait belt. Up with stand ,pivot transfer, wheelchair based. Continent of bowel and  bladder. Last BM on 12/22.   Left triple lumen PICC patent, IV antibiotic infused without difficulty.  PEG patent, clamped. 0200 BG was 131. Appears to be sleeping during rounds, Continue with plan of care.

## 2021-12-24 ENCOUNTER — APPOINTMENT (OUTPATIENT)
Dept: PHYSICAL THERAPY | Facility: CLINIC | Age: 56
End: 2021-12-24
Attending: PHYSICAL MEDICINE & REHABILITATION
Payer: COMMERCIAL

## 2021-12-24 ENCOUNTER — APPOINTMENT (OUTPATIENT)
Dept: SPEECH THERAPY | Facility: CLINIC | Age: 56
End: 2021-12-24
Attending: PHYSICAL MEDICINE & REHABILITATION
Payer: COMMERCIAL

## 2021-12-24 ENCOUNTER — APPOINTMENT (OUTPATIENT)
Dept: OCCUPATIONAL THERAPY | Facility: CLINIC | Age: 56
End: 2021-12-24
Attending: PHYSICAL MEDICINE & REHABILITATION
Payer: COMMERCIAL

## 2021-12-24 ENCOUNTER — HOME INFUSION (PRE-WILLOW HOME INFUSION) (OUTPATIENT)
Dept: PHARMACY | Facility: CLINIC | Age: 56
End: 2021-12-24
Payer: COMMERCIAL

## 2021-12-24 LAB
ACID FAST STAIN (ARUP): NORMAL
GLUCOSE BLDC GLUCOMTR-MCNC: 119 MG/DL (ref 70–99)
GLUCOSE BLDC GLUCOMTR-MCNC: 133 MG/DL (ref 70–99)
GLUCOSE BLDC GLUCOMTR-MCNC: 175 MG/DL (ref 70–99)
GLUCOSE BLDC GLUCOMTR-MCNC: 177 MG/DL (ref 70–99)
HOLD SPECIMEN: NORMAL
INR PPP: 2.48 (ref 0.86–1.14)

## 2021-12-24 PROCEDURE — 128N000003 HC R&B REHAB

## 2021-12-24 PROCEDURE — 250N000009 HC RX 250: Performed by: PHYSICAL MEDICINE & REHABILITATION

## 2021-12-24 PROCEDURE — 250N000011 HC RX IP 250 OP 636: Performed by: PHYSICIAN ASSISTANT

## 2021-12-24 PROCEDURE — 250N000012 HC RX MED GY IP 250 OP 636 PS 637: Performed by: PHYSICIAN ASSISTANT

## 2021-12-24 PROCEDURE — 999N000157 HC STATISTIC RCP TIME EA 10 MIN

## 2021-12-24 PROCEDURE — 250N000013 HC RX MED GY IP 250 OP 250 PS 637: Performed by: PHYSICAL MEDICINE & REHABILITATION

## 2021-12-24 PROCEDURE — 99232 SBSQ HOSP IP/OBS MODERATE 35: CPT | Mod: 24 | Performed by: PHYSICAL MEDICINE & REHABILITATION

## 2021-12-24 PROCEDURE — 97130 THER IVNTJ EA ADDL 15 MIN: CPT | Mod: GN

## 2021-12-24 PROCEDURE — 258N000003 HC RX IP 258 OP 636

## 2021-12-24 PROCEDURE — 94640 AIRWAY INHALATION TREATMENT: CPT | Mod: 76

## 2021-12-24 PROCEDURE — 99232 SBSQ HOSP IP/OBS MODERATE 35: CPT | Performed by: INTERNAL MEDICINE

## 2021-12-24 PROCEDURE — 97129 THER IVNTJ 1ST 15 MIN: CPT | Mod: GN

## 2021-12-24 PROCEDURE — 250N000013 HC RX MED GY IP 250 OP 250 PS 637: Performed by: INTERNAL MEDICINE

## 2021-12-24 PROCEDURE — 97535 SELF CARE MNGMENT TRAINING: CPT | Mod: GO | Performed by: STUDENT IN AN ORGANIZED HEALTH CARE EDUCATION/TRAINING PROGRAM

## 2021-12-24 PROCEDURE — 250N000013 HC RX MED GY IP 250 OP 250 PS 637: Performed by: PHYSICIAN ASSISTANT

## 2021-12-24 PROCEDURE — 250N000012 HC RX MED GY IP 250 OP 636 PS 637: Performed by: INTERNAL MEDICINE

## 2021-12-24 PROCEDURE — 97110 THERAPEUTIC EXERCISES: CPT | Mod: GP

## 2021-12-24 PROCEDURE — 94640 AIRWAY INHALATION TREATMENT: CPT

## 2021-12-24 PROCEDURE — 250N000011 HC RX IP 250 OP 636: Performed by: PHYSICAL MEDICINE & REHABILITATION

## 2021-12-24 PROCEDURE — 85610 PROTHROMBIN TIME: CPT | Performed by: INTERNAL MEDICINE

## 2021-12-24 PROCEDURE — 36592 COLLECT BLOOD FROM PICC: CPT | Performed by: INTERNAL MEDICINE

## 2021-12-24 PROCEDURE — 250N000009 HC RX 250: Performed by: INTERNAL MEDICINE

## 2021-12-24 RX ORDER — SODIUM CHLORIDE 9 MG/ML
INJECTION, SOLUTION INTRAVENOUS
Status: COMPLETED
Start: 2021-12-24 | End: 2021-12-24

## 2021-12-24 RX ORDER — WARFARIN SODIUM 2 MG/1
2 TABLET ORAL
Status: COMPLETED | OUTPATIENT
Start: 2021-12-24 | End: 2021-12-24

## 2021-12-24 RX ADMIN — FLUCONAZOLE, SODIUM CHLORIDE 400 MG: 2 INJECTION INTRAVENOUS at 10:14

## 2021-12-24 RX ADMIN — CEFTAZIDIME 2 G: 2 INJECTION, POWDER, FOR SOLUTION INTRAVENOUS at 12:29

## 2021-12-24 RX ADMIN — FUROSEMIDE 40 MG: 40 TABLET ORAL at 10:22

## 2021-12-24 RX ADMIN — Medication 800 MG: at 10:22

## 2021-12-24 RX ADMIN — Medication 5 ML: at 06:06

## 2021-12-24 RX ADMIN — NYSTATIN 1000000 UNITS: 100000 SUSPENSION ORAL at 10:19

## 2021-12-24 RX ADMIN — LEVOTHYROXINE SODIUM 25 MCG: 25 TABLET ORAL at 10:22

## 2021-12-24 RX ADMIN — Medication 10 ML: at 19:07

## 2021-12-24 RX ADMIN — Medication 1 TABLET: at 10:20

## 2021-12-24 RX ADMIN — NYSTATIN 1000000 UNITS: 100000 SUSPENSION ORAL at 12:34

## 2021-12-24 RX ADMIN — NYSTATIN 1000000 UNITS: 100000 SUSPENSION ORAL at 21:34

## 2021-12-24 RX ADMIN — ROSUVASTATIN CALCIUM 10 MG: 10 TABLET, FILM COATED ORAL at 10:20

## 2021-12-24 RX ADMIN — Medication 800 MG: at 21:35

## 2021-12-24 RX ADMIN — LEVALBUTEROL HYDROCHLORIDE 1.25 MG: 1.25 SOLUTION RESPIRATORY (INHALATION) at 08:21

## 2021-12-24 RX ADMIN — Medication 25 MG: at 21:35

## 2021-12-24 RX ADMIN — SODIUM CHLORIDE 500 ML: 9 INJECTION, SOLUTION INTRAVENOUS at 12:35

## 2021-12-24 RX ADMIN — PANTOPRAZOLE SODIUM 40 MG: 40 TABLET, DELAYED RELEASE ORAL at 16:41

## 2021-12-24 RX ADMIN — CEFTAZIDIME 2 G: 2 INJECTION, POWDER, FOR SOLUTION INTRAVENOUS at 03:35

## 2021-12-24 RX ADMIN — ACETYLCYSTEINE 2 ML: 200 SOLUTION ORAL; RESPIRATORY (INHALATION) at 20:19

## 2021-12-24 RX ADMIN — ACETYLCYSTEINE 2 ML: 200 SOLUTION ORAL; RESPIRATORY (INHALATION) at 08:21

## 2021-12-24 RX ADMIN — MYCOPHENOLATE MOFETIL 1000 MG: 250 CAPSULE ORAL at 21:35

## 2021-12-24 RX ADMIN — Medication 10 MG: at 21:35

## 2021-12-24 RX ADMIN — Medication 15 ML: at 12:34

## 2021-12-24 RX ADMIN — AMLODIPINE BESYLATE 5 MG: 5 TABLET ORAL at 10:24

## 2021-12-24 RX ADMIN — SULFAMETHOXAZOLE AND TRIMETHOPRIM 1 TABLET: 400; 80 TABLET ORAL at 10:22

## 2021-12-24 RX ADMIN — CALCIUM CARBONATE 600 MG (1,500 MG)-VITAMIN D3 400 UNIT TABLET 1 TABLET: at 18:55

## 2021-12-24 RX ADMIN — NYSTATIN 1000000 UNITS: 100000 SUSPENSION ORAL at 16:41

## 2021-12-24 RX ADMIN — ACETYLCYSTEINE 2 ML: 200 SOLUTION ORAL; RESPIRATORY (INHALATION) at 13:04

## 2021-12-24 RX ADMIN — LEVALBUTEROL HYDROCHLORIDE 1.25 MG: 1.25 SOLUTION RESPIRATORY (INHALATION) at 13:04

## 2021-12-24 RX ADMIN — MYCOPHENOLATE MOFETIL 1000 MG: 250 CAPSULE ORAL at 10:19

## 2021-12-24 RX ADMIN — CEFTAZIDIME 2 G: 2 INJECTION, POWDER, FOR SOLUTION INTRAVENOUS at 19:00

## 2021-12-24 RX ADMIN — CALCIUM CARBONATE 600 MG (1,500 MG)-VITAMIN D3 400 UNIT TABLET 1 TABLET: at 10:21

## 2021-12-24 RX ADMIN — PANTOPRAZOLE SODIUM 40 MG: 40 TABLET, DELAYED RELEASE ORAL at 10:22

## 2021-12-24 RX ADMIN — PREDNISONE 10 MG: 10 TABLET ORAL at 10:20

## 2021-12-24 RX ADMIN — MULTIPLE VITAMINS W/ MINERALS TAB 1 TABLET: TAB at 10:23

## 2021-12-24 RX ADMIN — TACROLIMUS 2 MG: 1 CAPSULE ORAL at 18:55

## 2021-12-24 RX ADMIN — WARFARIN SODIUM 2 MG: 2 TABLET ORAL at 18:55

## 2021-12-24 RX ADMIN — INSULIN GLARGINE 9 UNITS: 100 INJECTION, SOLUTION SUBCUTANEOUS at 10:25

## 2021-12-24 RX ADMIN — TACROLIMUS 2 MG: 1 CAPSULE ORAL at 10:25

## 2021-12-24 RX ADMIN — PREDNISONE 10 MG: 10 TABLET ORAL at 21:36

## 2021-12-24 RX ADMIN — LEVALBUTEROL HYDROCHLORIDE 1.25 MG: 1.25 SOLUTION RESPIRATORY (INHALATION) at 20:19

## 2021-12-24 RX ADMIN — ESCITALOPRAM OXALATE 20 MG: 20 TABLET ORAL at 10:24

## 2021-12-24 RX ADMIN — FLUTICASONE PROPIONATE 1 SPRAY: 50 SPRAY, METERED NASAL at 10:19

## 2021-12-24 ASSESSMENT — ACTIVITIES OF DAILY LIVING (ADL)
ADLS_ACUITY_SCORE: 24
ADLS_ACUITY_SCORE: 24
ADLS_ACUITY_SCORE: 26
ADLS_ACUITY_SCORE: 24
ADLS_ACUITY_SCORE: 24
ADLS_ACUITY_SCORE: 26
ADLS_ACUITY_SCORE: 24
ADLS_ACUITY_SCORE: 24
ADLS_ACUITY_SCORE: 26
ADLS_ACUITY_SCORE: 24
ADLS_ACUITY_SCORE: 26
ADLS_ACUITY_SCORE: 26
ADLS_ACUITY_SCORE: 24
ADLS_ACUITY_SCORE: 26
ADLS_ACUITY_SCORE: 24

## 2021-12-24 ASSESSMENT — MIFFLIN-ST. JEOR: SCORE: 1414.14

## 2021-12-24 NOTE — PLAN OF CARE
Discharge Planner Post-Acute Rehab SLP:      Discharge Plan: [facility] with [facility] , further SLP TBD     Precautions: aspiration precautions, sternal, fall     Current Status:  Communication: WFL.   Cognition: Mild-moderate cognitive impairments with deficits re: attention, exeuctive function, memory, and visuospatial skills, word finding.  Swallow: Regular, thin liquid. Medications to be given orally with thin liquids. Will continue to monitor.      Assessment: Pt with completion of assigned work from yesterday, 100% accuracy. Pt introduced to strategies to aid selection  and recall of important information via personal phone. Pt participated in task implementing strategies and demonstrates good carryover of learned material into practice. Pt encouraged to continue using functions upon discharge to increase IND with planning and keeping track of appts and other daily events. Pt given functional daily planning task. Plan to complete this PM.       Other Barriers to Discharge (Family Training, etc):

## 2021-12-24 NOTE — PLAN OF CARE
Patient sleeping for most of shift, does not like to be disturbed during the night but cooperative with necessary checks and IV.  BG at 0200 was 119, IV abx infused without concerns, has PICC triple lumen.  Patient denies pain, has peg that is just being flushed will be removed in next couple of days per provider.  No additional care concerns at this time, continue with POC.

## 2021-12-24 NOTE — PLAN OF CARE
"Discharge Planner Post-Acute Rehab PT:      Discharge Plan: [facility] with JANUSZ wheatT, then home to [facility]     Precautions: Falls, dyspnea on exertion     Current Status:  Bed Mobility: Min A  Transfer: SBA with WW  Gait:  ft with WW  Stairs: 5 step ups with min A  Balance: Multiple LOB during session and rapid sitting, recommending CGA at all times  30 seconds STS from 20\" mat using hands on FWW 12/19/21 = 6, OMNI 3/10  TUG 12/19/21, CGA with FWW = 20 seconds, OMNI 7/10     Assessment: Progressing well with functional IND, likely close to MOD I, but limited by various IVs and need for walker to ambulate.    Other Barriers to Discharge (DME, Family Training, etc): Significant deconditioning  "

## 2021-12-24 NOTE — PROGRESS NOTES
[facility]    Progress Note - TCU Service        Date of Admission:  12/13/2021    Assessment & Plan             Mr. 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    Today's changes: 12/23/21  No new issues.      # ILD and NSIP s/p BSLT on 10/16/2021  # Acute on chronic hypoxic respiratory failure s/p tracheostomy on 10/29/21 and decannulation on 12/8/2021  # Bilateral pleural effusions, improved.  - Transplant pulmonology following. IS dosing per Pulmonary team.  - No need for BiPAP, he can continue on CPAP and follow-up with sleep medicine as outpatient.      Prophylaxis:   - Bactrim for PJP ppx (held 11/2-11/5 d/t BRENNAN)  - Nystatin for oral candidiasis ppx, 6 month course  - Monthly EBV, CMV labs (last sent on 12/15/21, negative for both)     -Immunosuppression: s/p induction therapy with basiliximab 10/16 (and high dose IV steroid) and 10/20 Continue tacrolimus 2.5 mg qAM / 2 mg qPM (decreased 12/11).  Goal level 8-12. 12/16: 8.6.  [id number] mg BID (decreased 11/2 given GI bleed, AZA to be avoided given TPMT) , prednisone 10 mg BID, next taper due 1/2/22   - qM/Th CBC/BMP tacrolimus levels, fluconazole continued per ID     Positive cross match:   Positive DSA: Note that he received two doses of rituximab in June, which is likely contributing to cross match result.  DSA newly positive 11/29 with DQB5 (mfi 2522), decreased (mfi 1873) on 12/6. DSA on 12/12, decreased further to 1703.   - Repeat DSA (12/26) every two weeks     Others:   - Levalbuterol and mucomyst TID and pulm toilet  and chest PT BID  - PT/OT/SLP -@ PM&R team.   - Continue 40 mg of Lasix daily  -  Oxycodone discontinued   - Acetaminophen PRN 975mg tid     # Left lung cavitary lesion   # Klebsiella pneumoniae and Pseudomonas fluorescens/putida HAP  #Candida empyema  aspiration vs pseudomonal vs Klebsiella pneumonia related abscess. Indeterminate Quant Gold, but negative tuberculin skin tests on mulitple occurences. S/p BAL on 11/22.Cocci antigen in urine negative, serum, BAL histoplasmosis negative, CRAG negative, respiratory viral panel (-), TB (-), candida growth from bal culture. Transplant ID consulted, saw the patient on 12/21.      -  Continue IV Ceftazdime 2 grams Q8H (until 1/18/22), switch to levofloxacin 500mg PO after discharge from ARU  - Discontinue monthly coccidioides Ag  - Continue IV fluconazole till 1/18/22  - Follow up CT chest on 1/18/22 (ordered)  - Follow up with Dr. Abebe on 1/18/2022 @6:00 PM in a virtual visit.      # Hypogammaglobinemia:   IgG previously low at 364 (9/7).  Noted at 265 at time of transplant, s/p IVIG 10/21, repeat IgG (11/17) 198, s/p IVIG (with premedication) 11/18. Repeat IVIG on 12/15 of 365;   Per Pulm: IVIG indicated but ARU  unable to administer IVIG and patient is unable to leave to receive it in an infusion center.  - Repeat IgG ~ 12/30  - Further per Transplant team.     # Encephalopathy, resolved  # Difficulty sleeping  Likely multi-factorial in the setting of stroke, prolonged critical illness.  - Ct Seroquel 25 mg TID prn and 50 mg at bedtime. Wean down as tolerated.   - Mental status appears stable   - Melatonin 10 mg at bedtime  - Trazodone 25 mg prn.      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 MRI brain on 10/23 with multifocal subacute infarct within both cerebral hemispheres and left cerebellum. Presumed embolic with afib hx, identified while inpatient. He is currently able to  ambulate with relative ease.     - Continue warfarin per pharmacy protocol      # Right subclavian DVT   Diagnosed on 11/4 on ultrasound.  - Continue warfarin per pharmacy protocol  - INR therapeutic.      # DM2 with steroid induced hyperglycemia  Hemoglobin A1c 6.6 pre-operatively. Endocrine recently consulted for steroids induced hyperglycemia.      -Continue glargine 9IU qAM     # Severe malnutrition in the context of acute on chronic illness (resolving)  Patient currently has a PEG J-tube and is getting the Jorde of nutrition through cycle tube feeds.  - Multivitamin, folic acid, B6, B12 supplements   - TF now discontinued, only water flushes for patency. Patient has adequate caloric intake PO, PEG J-tube to be removed     # Anxiety and depression   Psychiatry reconsulted and after discussion suggest increasing lexapro. Will discuss with patient regarding starting ritalin in the AM for motivation.  -Ct Lexapro 20 mg daily (increased dose)  -As needed hydroxyzine.     # Concern for chipped tooth  # Poor dental hygiene  Patient noted he feels like he may have chipped his tooth or has a loose tooth after eating guevara. It is not painful.   - Dental hygiene consult placed and chipped tooth stable_ rec oral hygiene cares.     # Rheumatoid arthritis- Dx 5/2021 with + CCP antibody and RF. Previously treated with rituximab. Rheum consulted early in admission. On steroids as noted above.      # SALTY - Noted pre-transplant. Home CPAP 6-12 cm H2O. Patient currently sleeps without any issues, denies night awakenings or snoring. Discussed with Transplant Pulm, no current indication for CPAP or BiPAP.  - Consider sleep study as outpatient     # HTN  - Continue PTA amlodipine.   Stable.      # Hypothyroidism - TSH 3.17 on 11/19/21.   -Continue PTA levothyroxine 25 mcg daily.     # Recurrent GIB - hgb drop on 10/22 s/p 2 unit pRBC transfusion with EGD on 10/23 with NJ/OG tube trauma with scant oozing. Patient then developed  progressive hypotension and ultimately CODE BLUE for GIB in setting of anticoagulation with heparin drip, s/p massive transfusion protocol. EGD on 11/2 with large amount of clotted blood in stomach and area of raised mucosa with small adherent clot near PEG tube site that was clipped, no active bleeding.  Maroon stools 11/3, repeat EGD with extensive old blood in stomach, no active bleeding, small nodular area with prior clips clipped again.  Most recent EGD 11/5 with ulcer noted at PEG tube bumper site, gastritis, and suction marks from G tube. TF noted in G tube drainage bag 11/7, AXR with GJ tube tip projecting over proximal duodenum. GJ tube exchanged 11/9 by GI.     - PO PPI BID    .   # Hypomagnesemia: Suppressed reabsorption 2/2 CNI.  Requiring intermittent IV and PO replacement.  - Mag-Ox 800 mg qAM / 400 mg qPM (increased 12/8)     # Tremors: Potentially tacrolimus related. Ct to monitor.   Improving.      Diet: Room Service  Regular Diet Adult Thin Liquids (level 0)  Snacks/Supplements Adult: Nepro Oral Supplement; With Meals    DVT Prophylaxis: Warfarin  Watson Catheter: Not present  Fluids: None  Central Lines: None  Code Status: Full Code      Disposition Plan   Expected Discharge: 12/30/2021     Anticipated discharge location:  Awaiting care coordination huddle   Delays: None       The patient's care was discussed with the Attending Physician, Dr. Quintero.    Conrad Martin MD  TCU Service  [facility]  Securely message with the [employer] Web Console (learn more here)  Text page via [employer] [place] Paging/Directory        Clinically Significant Risk Factors Present on Admission                    ______________________________________________________________________    Interval History     No acute events overnight.   He was pleasant.   Other ROS negative.     Data reviewed today: I reviewed all medications, new labs and imaging results over the last 24 hours. I  personally reviewed no images or EKG's today.    Physical Exam   Vital Signs: Temp: 98.3  F (36.8  C) Temp src: Oral BP: (!) 145/86 Pulse: 96   Resp: 18 SpO2: 100 % O2 Device: None (Room air)    Weight: 145 lbs 9.6 oz  General Appearance:  AAOx4, sitting on chair, NAD, appears comfortable  Respiratory: CTAB, no crackles, rales or wheezing, patient on room air  Cardiovascular: S1, S2, rrr, no m/r/g  GI: Soft, non-tender, non distended. bs (+), PEG Tube in place  Skin: MEMO (-)    Data   Recent Labs   Lab 12/24/21  1315 12/24/21  0612 12/24/21  0146 12/23/21  2111 12/23/21  0815 12/23/21  0714 12/22/21  0753 12/22/21  0724 12/20/21  0824 12/20/21  0752   WBC  --   --   --   --   --  8.8  --   --   --  9.3   HGB  --   --   --   --   --  10.3*  --   --   --  9.5*   MCV  --   --   --   --   --  89  --   --   --  91   PLT  --   --   --   --   --  187  --   --   --  165   INR  --  2.48*  --   --   --  2.43*  --  2.36*   < > 2.22*   NA  --   --   --   --   --  141  --   --   --  142   POTASSIUM  --   --   --   --   --  4.5  --   --   --  4.2   CHLORIDE  --   --   --   --   --  107  --   --   --  108   CO2  --   --   --   --   --  26  --   --   --  27   BUN  --   --   --   --   --  29  --   --   --  30   CR  --   --   --   --   --  0.89  --   --   --  0.82   ANIONGAP  --   --   --   --   --  8  --   --   --  7   ERIK  --   --   --   --   --  9.2  --   --   --  9.2   *  --  119* 99   < > 130*   < >  --    < > 129*   ALBUMIN  --   --   --   --   --  2.8*  --   --   --   --    PROTTOTAL  --   --   --   --   --  6.0*  --   --   --   --    BILITOTAL  --   --   --   --   --  0.2  --   --   --   --    ALKPHOS  --   --   --   --   --  91  --   --   --   --    ALT  --   --   --   --   --  31  --   --   --   --    AST  --   --   --   --   --  22  --   --   --   --     < > = values in this interval not displayed.

## 2021-12-24 NOTE — PROGRESS NOTES
[facility]   Acute Rehabilitation Unit  Daily progress note    INTERVAL HISTORY  No acute events overnight.  This morning Mr. Thornton has no new concerns or complaints.  Still dyspneic with activity, but improving.  He continues to eat better and I removed his PEG/J tube today at bedside without complication.  Denies chest pain, other some soreness still at his prior chest tube site.  Functionally, SBA for mobility with a walker in the room, SBA for grooming and dressing, Min A bathing, SBA toileting and pericares, ambulating 140 ft ft with SBA and a WW.     MEDICATIONS  Scheduled meds    acetylcysteine  2 mL Nebulization TID     amLODIPine  5 mg Oral Daily     calcium carbonate 600 mg-vitamin D 400 units  1 tablet Oral or Feeding Tube BID w/meals     cefTAZidime  2 g Intravenous Q8H     escitalopram  20 mg Oral or Feeding Tube Daily     fluconazole  400 mg Intravenous Q24H     fluticasone  1 spray Both Nostrils Daily     folic acid-vit B6-vit B12  1 tablet Oral Daily     furosemide  40 mg Oral Daily     heparin lock flush  5-20 mL Intracatheter Q24H     insulin aspart  1-7 Units Subcutaneous TID AC     insulin glargine  9 Units Subcutaneous QAM     levalbuterol  1.25 mg Nebulization TID     levothyroxine  25 mcg Oral Daily     magnesium oxide  800 mg Oral BID     multivitamin w/minerals  1 tablet Oral Daily     mycophenolate  1,000 mg Oral BID     nystatin  1,000,000 Units Swish & Swallow 4x Daily     pantoprazole  40 mg Oral BID AC     [START ON 1/2/2022] predniSONE  10 mg Oral Daily     predniSONE  10 mg Oral or Feeding Tube BID     [START ON 1/2/2022] predniSONE  7.5 mg Oral QPM     rosuvastatin  10 mg Oral or Feeding Tube Daily     sodium chloride (PF)  10-40 mL Intracatheter Q8H     sodium chloride         sulfamethoxazole-trimethoprim  1 tablet Oral or Feeding Tube Daily     tacrolimus  2 mg Oral BID IS     warfarin ANTICOAGULANT  2 mg Oral ONCE at 18:00       PRN  "meds:  acetaminophen, albuterol, bisacodyl, dextrose, glucose **OR** dextrose **OR** glucagon, heparin lock flush, hydrOXYzine, lidocaine (viscous), magnesium hydroxide, melatonin, naloxone **OR** naloxone **OR** naloxone **OR** naloxone, ondansetron, - MEDICATION INSTRUCTIONS -, prochlorperazine, QUEtiapine, senna-docusate, sodium chloride (PF), sodium chloride (PF), traZODone, Warfarin Therapy Reminder, zinc-white petrolatum      PHYSICAL EXAM  BP (!) 145/86 (BP Location: Right arm)   Pulse 96   Temp 98.3  F (36.8  C) (Oral)   Resp 18   Ht 1.646 m (5' 4.8\")   Wt 66 kg (145 lb 9.6 oz)   SpO2 99%   BMI 24.38 kg/m       Gen: Awake, coooperative NAD  HEENT: Trach site now healed  Cardio: RRR, S1+S2, no m/r/g  Pulm: Non-labored breathing at rest, with lungs clear diminished at bases.  Abd: Soft, non-tender to palpation, bowel sounds present. PEG in place which was removed at bedside today and dressing applied.  Ext: No edema in lower or upper extremities b/l, no calf tenderness  Neuro/MSK: alert, oriented, answers questions appropriately, follows commands.      LABS  Recent Labs   Lab 12/24/21  0612 12/24/21  0146 12/23/21  2111 12/23/21  1703 12/23/21  0815 12/23/21  0714 12/22/21  0753 12/22/21  0724 12/20/21  0824 12/20/21  0752   WBC  --   --   --   --   --  8.8  --   --   --  9.3   HGB  --   --   --   --   --  10.3*  --   --   --  9.5*   MCV  --   --   --   --   --  89  --   --   --  91   PLT  --   --   --   --   --  187  --   --   --  165   INR 2.48*  --   --   --   --  2.43*  --  2.36*   < > 2.22*   NA  --   --   --   --   --  141  --   --   --  142   POTASSIUM  --   --   --   --   --  4.5  --   --   --  4.2   CHLORIDE  --   --   --   --   --  107  --   --   --  108   CO2  --   --   --   --   --  26  --   --   --  27   BUN  --   --   --   --   --  29  --   --   --  30   CR  --   --   --   --   --  0.89  --   --   --  0.82   ANIONGAP  --   --   --   --   --  8  --   --   --  7   ERIK  --   --   --   --   -- "  9.2  --   --   --  9.2   GLC  --  119* 99 200*   < > 130*   < >  --    < > 129*   ALBUMIN  --   --   --   --   --  2.8*  --   --   --   --    PROTTOTAL  --   --   --   --   --  6.0*  --   --   --   --    BILITOTAL  --   --   --   --   --  0.2  --   --   --   --    ALKPHOS  --   --   --   --   --  91  --   --   --   --    ALT  --   --   --   --   --  31  --   --   --   --    AST  --   --   --   --   --  22  --   --   --   --     < > = values in this interval not displayed.     No results found for this or any previous visit (from the past 24 hour(s)).    ASSESSMENT AND PLAN    Edson Thornton is a 56 year old right hand dominant male with a hx of NSIP/ILD associated with rheumatoid lung disease, hypertension, hyperlipidemia, RA who initially presented 9/5/2021 with acute on chronic respiratory failure for transplant work-up s/p bilateral lung transplant (10/16/21) with prolonged hospitalization associated with multifocal bilateral acute/subacute ischemic infarcts of bilateral frontal, occipital lobes.  Postoperative course complicated by prolonged ventilator wean s/p trach and PEG placement (10/29), encephalopathy, new Afib with RVR, BRENNAN, candidemia/Candida empyema, and brief bradycardic arrest with subsequent hypotension in the setting of GI bleed.  [facility] and was decannulated 12/8/2021.  He is currently stable on room air at admission to acute rehab.  He presents with right upper extremity weakness, bilateral dysmetria/tremors, acute on chronic severe deconditioning, dyspnea on exertion, and impairment of ADLs and gait.     Admission to acute inpatient rehab 12/13/2021  .    Impairment group code: Stroke Ischemic 01.3 Bilateral Involvement: embolic CVA affecting B cerebral hemispheres and L cerebellum, as well as s/p bilateral lung transplantation     1. PT, OT 90 minutes of each on a daily basis, in addition to rehab nursing and close management of physiatrist.    2. Impairment of ADLs/IADLs:  OT for 60 min daily to work on upper and lower body self care, dressing, toileting, bathing, energy conservation techniques with use of ADs as needed.   3. Impairment of mobility:   PT for 60 min daily to work on gait exercises, strengthening, endurance buildup, transfers with use of walker as needed.   4. Impairment of cognition:  SLP for 60 minutes for cognitive evaluation and treatment strategies for higher level cognitive deficits, and memory impairment.  5. Rehab RN to administer medication, patient education on medication taking, VS monitoring, bowel regimen, glucose monitoring and wound care/surgical wound dressing changes and monitoring.   6. Medical Conditions - Hospitalist team consulted, appreciate assistance     Neuro/Psych  #Multifocal acute to subacute ischemic stroke, etiology likely embolic vs perioperative  Initially noted 10/22 and 11/6 with stroke code called for change in exam.  MRI brain 10/23 with infarcts noted in both cerebral hemispheres (R frontal, L corona radiata, bilateral occipital), left cerebellum, presumed associated with new Afib vs perioperative. Bilateral upper extremity paresis (R>L), suspicion for some cortical blindness  - Stroke risk factor management:              -Warfarin anticoagulation for Afib, appreciate pharmacy assistance with dosing              -BP goals: SBP<140              -continue Rosuvastatin 10 mg qday              -Not smoking              -Hgb A1c: 6.6%     #Pain  -Acetaminophen 975mg q8h PRN  -Lidocaine patch  -Menthol patch     #Encephalopathy  #Sleep  -Delirium precautions  -Now completed Seroquel weaning (12/22)  -Melatonin 10mg qhs  -Trazodone 25mg prn     #Mood  #History of anxiety and depression  - PTA Lexapro increased to 10 mg while in acute hospital, increased to 20mg 12/14  -Hydroxyzine as needed         Pulmonary  #ILD, NISP, s/p bilateral lung transplant (10/16/2021)  #Acute on chronic respiratory failure s/p  tracheostomy (10/29/21) and decannulation (12/8/21)  #Bilateral Pleural Effusions  -Sternal precautions until 12 wks post op (1/8/2022)  -Immunosuppression:              -Continue tacrolimus  goal 8-12              -Continue MMF 1000mg BID              -Continue prednisone 10mg BID  -Monitoring lab/imaging              -DSA q2wks              -Monthly Coccidioides Ag per transplant completed 12/17              -CMV, EBV per transplant team              -CXR, CBC, BMP, Tac level M/Th  -Continue Levalbuterol and mucomyst nebs QID  -Aggressive pulmonary toilet and chest PT QID  -Continue Lasix 40mg qday     #SALTY  -PTA CPAP  -Trach site now healed.  IM discussed with pulmonology, no indication for CPAP or BiPAP at this time.     ID  #Immunosuppression Prophylaxsis   -Bactrim x6 months  -Nystatin x6 months     #L lung cavitary lesion  #Hospital-acquired pneumonia with Klebsiella pneumoniae, Pseudomonas fluorescens/putida  Suspected aspiration versus pseudomonal vs Klebsiella pneumonia related to abscess.  CT  11/22 showing new cavitary lesion in left lung base, multifocal bilateral upper lobe groundglass opacity, and new 1.8 cm fluid collection with surrounding fat stranding in left axilla, decreased bilateral loculated pleural effusions.  QuantiFERON gold indeterminate, negative tuberculin skin test on multiple occurrences.  Underwent BAL on 11/22 with cultures growing Klebsiella and Pseudomonas fluorescens/putida, resistant to meropenem.  ID was consulted and feels this is likely fungal.  B-D glucan positive with known candidemia.  Cocci antigen in urine negative, serum histoplasmosis negative, CRAG negative  -Follow-up chest completed CT 12/20   1. Overall decreased (since 11/22/2021 chest CT) size and number of bilateral infectious/inflammatory groundglass opacities. 2. Decreased but persistent left lower lobe cavitary lesion.  -Followed up with Dr. Abebe 12/21, recs appreciated   -Continue fluconazole for two  additional weeks (until 1/18/2022) to complete 8 weeks of therapy for Genevieve empyema.    -Continue ceftazidime for four additional weeks until 1/18/2022 however, when ready for discharge he may be discharged on PO levaquin 500 mg daily.    -Repeat CT chest without contrast 1/18/2022 before his appointment with Dr. Abebe (1/18/22 at 6:00pm via virtual visit)   -No need to check monthly Coccidioides AG.      #Invasive Candida albicans candidiasis  Positive Candida blood cultures 10/20, 10/22.  Fungitell positive (399).  TC 10/23 without evidence of endocarditis. Ophthalmic consulted 10/24, benign funduscopic exam.  Candida empyema 10/25.  Repeated pleural effusion fungal cultures and fungal/bacterial blood cultures have been without growth since 11/18.  Initially on micafungin (10/22-10/27) before transition to fluconazole IV  -Continue fluconazole for two additional weeks (until 1/18/2022) to complete 8 weeks of therapy for Genevieve empyema per ID follow up recs 12/21     #hx of HSV  Chronic intermittent infection pretransplant, with recent HSV infection while in acute hospital (crusted lesions along left jaw) s/p 10d treatment until 10/9.     Cardiovascular  #Afib  Initially noted 10/18, converted to NSR with amio ggt. Metoprolol and amiodarone discontinued due to bradycardia. INR: 2.48 12/24  -Warfarin as dosed by pharmacy     #Hypertension  -continue PTA amlodipine 5 mg daily  -also on lasix 40 mg daily      Heme  #Leukocytosis  New leukocytosis to 13.2 on 12/14 -Now returned to normal limits and stable at 8.8 on 12/23      #R subclavian DVT  -Warfarin anticoagulation, pharmacy to dose     #Hypogammaglobulinemia  S/p IVIG  -Repeat IgG 12/15 365  -Repeat ~12/30     Endocrine  #Type 2 diabetes  #Steroid-induced hyperglycemia  -Endocrine consulted while in acute hospital.  Tube feeds on hold starting 12/20.   -  Continue Lantus 9 units daily, sliding scale insulin and carbohydrate coverage- titrate as indicated.       #Hypothyroidism  TSH 3.17 on 11/19  -Continue levothyroxine 25 mcg daily     Renal  #BRENNAN, resolved  Cr 0.89 12/23, stable  -Monitor intermittently     MSK/Rheum  #Rheumatoid arthritis  Previously treated with rituximab.  Rheumatology familiar and consulted early in admission.  -Steroids as above     GI/FEN  #Severe malnutrition in the context of acute on chronic illness or injury and evidenced by >7.5% weight loss in the past 3 months  -PEG/J tube removed 12/24 at bedside with improved PO intake.  -Continue multivitamin, folate, B6, B12 supplements  -Dietician assistance appreciated       #hx of GI Bleed  Patient with progressive hypotension and CODE BLUE for bradycardia/asystole associated with GI bleed in the setting of anticoagulation, requiring massive transfusion protocol on 10/22.  EGD on 11/2 with clotted blood in stomach, adherent clot near PEG site that was clipped without active bleeding.  Most recent EGD 11/5 with ulcer noted near the PEG bumper site, gastritis, suction marks from the G-tube.  GJ tube was exchanged 11/9 by GI. Hgb 10.3 12/23  -Monitor hemoglobin intermittently.  -Continue PPI BID      Bowel/Bladder  Bowel, continent  Constipation  - Senna-docusate 1-2 tablets BID  - Miralax QDAY PRN  - Bisacodyl suppository 10mg QDAY PRN     Bladder, continent     1. Adjustment to disability:  Clinical psychology to eval and treat as indicated  2. FEN: Regular with thin liquids  3. Bowel: continent, meds as above  4. Bladder: continent  5. DVT Prophylaxis: warfarin  6. GI Prophylaxis: PPI  7. Code: full  8. Disposition: Local housing - [facility]  9. ELOS:  2-3 weeks  10. Rehab prognosis:  good  9. Follow up Appointments on Discharge:                -Neurology 1-2 months after discharge               -Outpatient cardiac/pulmonary Rehab               -Transplant Coordinator               -AISHA Coronado MD  Department of Rehabilitation Medicine        Time Spent on this Encounter   Lan GERARD  Cliff, spent a total of 25 minutes face-to-face or managing the care of Edson Thornton. Over 50% of my time on the unit was spent counseling the patient and coordinating care. See note for details.

## 2021-12-24 NOTE — PLAN OF CARE
Discharge Planner Post-Acute Rehab OT:      Discharge Plan: to [facility] with fiance     Precautions: sternal, fall     Current Status:  ADLs:    Mobility: SBA for in room mobility using walker    Grooming: SBA standing at the sink, chair  near by for intermittent rest breaks, Seated only w/ set up to manage fatigue.     Dressing: UB SBA, SBA for LB donning pants seated and standing to pull up, additional effort due to fatigue- pt may benefit from a reacher to decreased trunk bending.     Bathing: Min A w/shower chair    Toileting: SBA for transfer using walker, SBA for [place]-cares to follow precautions  IADLs: fiance will assist  Vision/Cognition: L visual deficit at baseline and new right eye blind spot. Pt scored 24/30 on the SLUMS indicating mild deficit     Assessment: Pt making improvements in endurance and ADL. Pt does often rub into items on the right side due top right visual deficit. Pt was educate don scanning environments but currently requires cues. Will need to work on this to build independence.       Other Barriers to Discharge (DME, Family Training, etc): poor activity endurance

## 2021-12-24 NOTE — PLAN OF CARE
Orientation: Alert and oriented x4  Bowel: Continent of bowel. LBM 12/23  Bladder: Continent of bladder.  Pain: Denies pain.  Ambulation/Transfers: Assist of 1 with walker.  Blood sugars: Pt ate before am blood sugar could be obtained. Lunch bg required no insulin.  Diet/ Liquids: Regular thin  Tubes/ Lines/ Drains: Triple lumen PICC heparin locked with blood return. PEG tube removed today without incident.     Bed alarm on for safety, call light within reach. Continue with POC.

## 2021-12-24 NOTE — PLAN OF CARE
FOCUS/GOAL  Medication management, Mobility, and Safety management    ASSESSMENT, INTERVENTIONS AND CONTINUING PLAN FOR GOAL:    A&O x 4. Calls appropriately. Transfers with CGA and FWW. Does have some dyspnea with exertion but declines the need for oxygen. Denies pain or dizziness. Regular diet, thin liquids, takes pills whole. Triple lumen PICC to LUE, IV abx infused this shift per order. PICC heparin locked after infusion. PEG tube CDI, flushed with 200mL of water. Continent of bowel and bladder. Last BM 12/22. Discharge plans pending medical readiness.    Hodan Aguilar RN

## 2021-12-25 LAB
GLUCOSE BLDC GLUCOMTR-MCNC: 110 MG/DL (ref 70–99)
GLUCOSE BLDC GLUCOMTR-MCNC: 131 MG/DL (ref 70–99)
GLUCOSE BLDC GLUCOMTR-MCNC: 134 MG/DL (ref 70–99)
GLUCOSE BLDC GLUCOMTR-MCNC: 177 MG/DL (ref 70–99)
HOLD SPECIMEN: NORMAL
HOLD SPECIMEN: NORMAL
INR PPP: 2.3 (ref 0.86–1.14)

## 2021-12-25 PROCEDURE — 250N000012 HC RX MED GY IP 250 OP 636 PS 637: Performed by: PHYSICIAN ASSISTANT

## 2021-12-25 PROCEDURE — 250N000009 HC RX 250: Performed by: INTERNAL MEDICINE

## 2021-12-25 PROCEDURE — 250N000013 HC RX MED GY IP 250 OP 250 PS 637: Performed by: PHYSICAL MEDICINE & REHABILITATION

## 2021-12-25 PROCEDURE — 250N000009 HC RX 250: Performed by: PHYSICAL MEDICINE & REHABILITATION

## 2021-12-25 PROCEDURE — 36592 COLLECT BLOOD FROM PICC: CPT | Performed by: INTERNAL MEDICINE

## 2021-12-25 PROCEDURE — 94640 AIRWAY INHALATION TREATMENT: CPT

## 2021-12-25 PROCEDURE — 250N000011 HC RX IP 250 OP 636: Performed by: PHYSICIAN ASSISTANT

## 2021-12-25 PROCEDURE — 128N000003 HC R&B REHAB

## 2021-12-25 PROCEDURE — 250N000011 HC RX IP 250 OP 636: Performed by: PHYSICAL MEDICINE & REHABILITATION

## 2021-12-25 PROCEDURE — 250N000013 HC RX MED GY IP 250 OP 250 PS 637: Performed by: PHYSICIAN ASSISTANT

## 2021-12-25 PROCEDURE — 999N000157 HC STATISTIC RCP TIME EA 10 MIN

## 2021-12-25 PROCEDURE — 258N000003 HC RX IP 258 OP 636

## 2021-12-25 PROCEDURE — 250N000013 HC RX MED GY IP 250 OP 250 PS 637: Performed by: INTERNAL MEDICINE

## 2021-12-25 PROCEDURE — 94640 AIRWAY INHALATION TREATMENT: CPT | Mod: 76

## 2021-12-25 PROCEDURE — 85610 PROTHROMBIN TIME: CPT | Performed by: INTERNAL MEDICINE

## 2021-12-25 PROCEDURE — 250N000012 HC RX MED GY IP 250 OP 636 PS 637: Performed by: INTERNAL MEDICINE

## 2021-12-25 RX ORDER — SODIUM CHLORIDE 9 MG/ML
INJECTION, SOLUTION INTRAVENOUS
Status: COMPLETED
Start: 2021-12-25 | End: 2021-12-25

## 2021-12-25 RX ORDER — WARFARIN SODIUM 2 MG/1
2 TABLET ORAL
Status: COMPLETED | OUTPATIENT
Start: 2021-12-25 | End: 2021-12-25

## 2021-12-25 RX ADMIN — CALCIUM CARBONATE 600 MG (1,500 MG)-VITAMIN D3 400 UNIT TABLET 1 TABLET: at 10:20

## 2021-12-25 RX ADMIN — ACETYLCYSTEINE 2 ML: 200 SOLUTION ORAL; RESPIRATORY (INHALATION) at 13:01

## 2021-12-25 RX ADMIN — Medication 25 MG: at 21:04

## 2021-12-25 RX ADMIN — ESCITALOPRAM OXALATE 20 MG: 20 TABLET ORAL at 10:18

## 2021-12-25 RX ADMIN — MYCOPHENOLATE MOFETIL 1000 MG: 250 CAPSULE ORAL at 10:18

## 2021-12-25 RX ADMIN — FUROSEMIDE 40 MG: 40 TABLET ORAL at 10:19

## 2021-12-25 RX ADMIN — TACROLIMUS 2 MG: 1 CAPSULE ORAL at 10:20

## 2021-12-25 RX ADMIN — LEVALBUTEROL HYDROCHLORIDE 1.25 MG: 1.25 SOLUTION RESPIRATORY (INHALATION) at 09:35

## 2021-12-25 RX ADMIN — TACROLIMUS 2 MG: 1 CAPSULE ORAL at 17:50

## 2021-12-25 RX ADMIN — Medication 800 MG: at 10:19

## 2021-12-25 RX ADMIN — MULTIPLE VITAMINS W/ MINERALS TAB 1 TABLET: TAB at 10:20

## 2021-12-25 RX ADMIN — WARFARIN SODIUM 2 MG: 2 TABLET ORAL at 17:50

## 2021-12-25 RX ADMIN — CEFTAZIDIME 2 G: 2 INJECTION, POWDER, FOR SOLUTION INTRAVENOUS at 10:49

## 2021-12-25 RX ADMIN — ACETYLCYSTEINE 2 ML: 200 SOLUTION ORAL; RESPIRATORY (INHALATION) at 21:29

## 2021-12-25 RX ADMIN — Medication 800 MG: at 21:04

## 2021-12-25 RX ADMIN — CEFTAZIDIME 2 G: 2 INJECTION, POWDER, FOR SOLUTION INTRAVENOUS at 03:03

## 2021-12-25 RX ADMIN — LEVALBUTEROL HYDROCHLORIDE 1.25 MG: 1.25 SOLUTION RESPIRATORY (INHALATION) at 13:01

## 2021-12-25 RX ADMIN — LEVOTHYROXINE SODIUM 25 MCG: 25 TABLET ORAL at 10:20

## 2021-12-25 RX ADMIN — PANTOPRAZOLE SODIUM 40 MG: 40 TABLET, DELAYED RELEASE ORAL at 10:19

## 2021-12-25 RX ADMIN — SULFAMETHOXAZOLE AND TRIMETHOPRIM 1 TABLET: 400; 80 TABLET ORAL at 10:20

## 2021-12-25 RX ADMIN — PANTOPRAZOLE SODIUM 40 MG: 40 TABLET, DELAYED RELEASE ORAL at 16:00

## 2021-12-25 RX ADMIN — Medication 10 ML: at 03:58

## 2021-12-25 RX ADMIN — Medication 10 MG: at 21:05

## 2021-12-25 RX ADMIN — INSULIN GLARGINE 9 UNITS: 100 INJECTION, SOLUTION SUBCUTANEOUS at 10:22

## 2021-12-25 RX ADMIN — CEFTAZIDIME 2 G: 2 INJECTION, POWDER, FOR SOLUTION INTRAVENOUS at 18:18

## 2021-12-25 RX ADMIN — ROSUVASTATIN CALCIUM 10 MG: 10 TABLET, FILM COATED ORAL at 10:21

## 2021-12-25 RX ADMIN — FLUCONAZOLE, SODIUM CHLORIDE 400 MG: 2 INJECTION INTRAVENOUS at 10:44

## 2021-12-25 RX ADMIN — NYSTATIN 1000000 UNITS: 100000 SUSPENSION ORAL at 10:21

## 2021-12-25 RX ADMIN — NYSTATIN 1000000 UNITS: 100000 SUSPENSION ORAL at 16:00

## 2021-12-25 RX ADMIN — Medication 15 ML: at 19:02

## 2021-12-25 RX ADMIN — Medication 15 ML: at 12:26

## 2021-12-25 RX ADMIN — PREDNISONE 10 MG: 10 TABLET ORAL at 21:04

## 2021-12-25 RX ADMIN — NYSTATIN 1000000 UNITS: 100000 SUSPENSION ORAL at 13:15

## 2021-12-25 RX ADMIN — NYSTATIN 1000000 UNITS: 100000 SUSPENSION ORAL at 21:05

## 2021-12-25 RX ADMIN — MYCOPHENOLATE MOFETIL 1000 MG: 250 CAPSULE ORAL at 21:05

## 2021-12-25 RX ADMIN — SODIUM CHLORIDE 500 ML: 9 INJECTION, SOLUTION INTRAVENOUS at 10:49

## 2021-12-25 RX ADMIN — FLUTICASONE PROPIONATE 1 SPRAY: 50 SPRAY, METERED NASAL at 10:50

## 2021-12-25 RX ADMIN — LEVALBUTEROL HYDROCHLORIDE 1.25 MG: 1.25 SOLUTION RESPIRATORY (INHALATION) at 21:28

## 2021-12-25 RX ADMIN — AMLODIPINE BESYLATE 5 MG: 5 TABLET ORAL at 10:19

## 2021-12-25 RX ADMIN — PREDNISONE 10 MG: 10 TABLET ORAL at 10:21

## 2021-12-25 RX ADMIN — Medication 1 TABLET: at 10:21

## 2021-12-25 RX ADMIN — CALCIUM CARBONATE 600 MG (1,500 MG)-VITAMIN D3 400 UNIT TABLET 1 TABLET: at 17:50

## 2021-12-25 RX ADMIN — ACETYLCYSTEINE 2 ML: 200 SOLUTION ORAL; RESPIRATORY (INHALATION) at 09:35

## 2021-12-25 ASSESSMENT — ACTIVITIES OF DAILY LIVING (ADL)
ADLS_ACUITY_SCORE: 26
ADLS_ACUITY_SCORE: 23
ADLS_ACUITY_SCORE: 26
ADLS_ACUITY_SCORE: 26
ADLS_ACUITY_SCORE: 23
ADLS_ACUITY_SCORE: 23
ADLS_ACUITY_SCORE: 25
ADLS_ACUITY_SCORE: 26
ADLS_ACUITY_SCORE: 25
ADLS_ACUITY_SCORE: 26
ADLS_ACUITY_SCORE: 23
ADLS_ACUITY_SCORE: 26
ADLS_ACUITY_SCORE: 25
ADLS_ACUITY_SCORE: 26
ADLS_ACUITY_SCORE: 25
ADLS_ACUITY_SCORE: 26

## 2021-12-25 NOTE — PLAN OF CARE
FOCUS/GOAL  Bowel management, Bladder management, Pain management, Wound care management, Mobility, Skin integrity, and Safety management    ASSESSMENT, INTERVENTIONS AND CONTINUING PLAN FOR GOAL:  A/O x4, VSS on RA.  Up w/ CG gait belt and walker to the bathroom.  Continent of bowel and bladder, last BM this shift.  Regular thin diet, takes his pills well whole. Bgs were 175 and .  Bilateral upper extremity tremors continue.  Left triple lumen picc intact, wnl heparin locked.  Old Peg tube site dressing changed.  No c/o pain.  Cont w/ POC.

## 2021-12-25 NOTE — PLAN OF CARE
Orientation: Alert and oriented x4  Bowel: Continent of bowel. LBM 12/25  Bladder: Continent of bladder.  Pain: Denies pain.  Ambulation/Transfers: Assist of 1 with walker.  Blood sugars: No insulin required per sliding scale  Diet/ Liquids: Regular thin  Tubes/ Lines/ Drains: Triple lumen PICC heparin locked with blood return.      Bed alarm on for safety, call light within reach. Continue with POC.

## 2021-12-25 NOTE — PLAN OF CARE
FOCUS/GOAL  Bowel management, Bladder management, and Mobility    ASSESSMENT, INTERVENTIONS AND CONTINUING PLAN FOR GOAL:  Pt is alert and oriented. Continent of B&B. LBM 12/24. CGA with walker and GB. Denied pain. Call light within reach. Bed alarms on. Will continue with POC.

## 2021-12-26 ENCOUNTER — APPOINTMENT (OUTPATIENT)
Dept: SPEECH THERAPY | Facility: CLINIC | Age: 56
End: 2021-12-26
Attending: PHYSICAL MEDICINE & REHABILITATION
Payer: COMMERCIAL

## 2021-12-26 ENCOUNTER — APPOINTMENT (OUTPATIENT)
Dept: OCCUPATIONAL THERAPY | Facility: CLINIC | Age: 56
End: 2021-12-26
Attending: PHYSICAL MEDICINE & REHABILITATION
Payer: COMMERCIAL

## 2021-12-26 LAB
GLUCOSE BLDC GLUCOMTR-MCNC: 114 MG/DL (ref 70–99)
GLUCOSE BLDC GLUCOMTR-MCNC: 139 MG/DL (ref 70–99)
GLUCOSE BLDC GLUCOMTR-MCNC: 148 MG/DL (ref 70–99)
GLUCOSE BLDC GLUCOMTR-MCNC: 149 MG/DL (ref 70–99)
GLUCOSE BLDC GLUCOMTR-MCNC: 149 MG/DL (ref 70–99)
INR PPP: 2.61 (ref 0.86–1.14)

## 2021-12-26 PROCEDURE — 250N000009 HC RX 250: Performed by: INTERNAL MEDICINE

## 2021-12-26 PROCEDURE — 97129 THER IVNTJ 1ST 15 MIN: CPT | Mod: GN

## 2021-12-26 PROCEDURE — 36592 COLLECT BLOOD FROM PICC: CPT | Performed by: INTERNAL MEDICINE

## 2021-12-26 PROCEDURE — 250N000013 HC RX MED GY IP 250 OP 250 PS 637: Performed by: PHYSICAL MEDICINE & REHABILITATION

## 2021-12-26 PROCEDURE — 97110 THERAPEUTIC EXERCISES: CPT | Mod: GO

## 2021-12-26 PROCEDURE — 99231 SBSQ HOSP IP/OBS SF/LOW 25: CPT | Mod: 24 | Performed by: STUDENT IN AN ORGANIZED HEALTH CARE EDUCATION/TRAINING PROGRAM

## 2021-12-26 PROCEDURE — 97530 THERAPEUTIC ACTIVITIES: CPT | Mod: GO

## 2021-12-26 PROCEDURE — 250N000012 HC RX MED GY IP 250 OP 636 PS 637: Performed by: PHYSICIAN ASSISTANT

## 2021-12-26 PROCEDURE — 250N000013 HC RX MED GY IP 250 OP 250 PS 637: Performed by: INTERNAL MEDICINE

## 2021-12-26 PROCEDURE — 258N000003 HC RX IP 258 OP 636

## 2021-12-26 PROCEDURE — 250N000011 HC RX IP 250 OP 636: Performed by: PHYSICIAN ASSISTANT

## 2021-12-26 PROCEDURE — 128N000003 HC R&B REHAB

## 2021-12-26 PROCEDURE — 250N000011 HC RX IP 250 OP 636: Performed by: PHYSICAL MEDICINE & REHABILITATION

## 2021-12-26 PROCEDURE — 94640 AIRWAY INHALATION TREATMENT: CPT | Mod: 76

## 2021-12-26 PROCEDURE — 94640 AIRWAY INHALATION TREATMENT: CPT

## 2021-12-26 PROCEDURE — 99232 SBSQ HOSP IP/OBS MODERATE 35: CPT | Performed by: INTERNAL MEDICINE

## 2021-12-26 PROCEDURE — 85610 PROTHROMBIN TIME: CPT | Performed by: INTERNAL MEDICINE

## 2021-12-26 PROCEDURE — 97535 SELF CARE MNGMENT TRAINING: CPT | Mod: GO

## 2021-12-26 PROCEDURE — 250N000013 HC RX MED GY IP 250 OP 250 PS 637: Performed by: PHYSICIAN ASSISTANT

## 2021-12-26 PROCEDURE — 250N000009 HC RX 250: Performed by: PHYSICAL MEDICINE & REHABILITATION

## 2021-12-26 PROCEDURE — 97130 THER IVNTJ EA ADDL 15 MIN: CPT | Mod: GN

## 2021-12-26 PROCEDURE — 250N000012 HC RX MED GY IP 250 OP 636 PS 637: Performed by: INTERNAL MEDICINE

## 2021-12-26 PROCEDURE — 999N000157 HC STATISTIC RCP TIME EA 10 MIN

## 2021-12-26 RX ORDER — SODIUM CHLORIDE 9 MG/ML
INJECTION, SOLUTION INTRAVENOUS
Status: COMPLETED
Start: 2021-12-26 | End: 2021-12-26

## 2021-12-26 RX ORDER — WARFARIN SODIUM 2 MG/1
2 TABLET ORAL
Status: COMPLETED | OUTPATIENT
Start: 2021-12-26 | End: 2021-12-26

## 2021-12-26 RX ADMIN — Medication 15 ML: at 11:53

## 2021-12-26 RX ADMIN — TACROLIMUS 2 MG: 1 CAPSULE ORAL at 10:03

## 2021-12-26 RX ADMIN — AMLODIPINE BESYLATE 5 MG: 5 TABLET ORAL at 10:04

## 2021-12-26 RX ADMIN — CEFTAZIDIME 2 G: 2 INJECTION, POWDER, FOR SOLUTION INTRAVENOUS at 02:57

## 2021-12-26 RX ADMIN — PREDNISONE 10 MG: 10 TABLET ORAL at 21:10

## 2021-12-26 RX ADMIN — ESCITALOPRAM OXALATE 20 MG: 20 TABLET ORAL at 10:04

## 2021-12-26 RX ADMIN — Medication 15 ML: at 03:37

## 2021-12-26 RX ADMIN — SULFAMETHOXAZOLE AND TRIMETHOPRIM 1 TABLET: 400; 80 TABLET ORAL at 10:04

## 2021-12-26 RX ADMIN — INSULIN GLARGINE 9 UNITS: 100 INJECTION, SOLUTION SUBCUTANEOUS at 11:17

## 2021-12-26 RX ADMIN — MYCOPHENOLATE MOFETIL 1000 MG: 250 CAPSULE ORAL at 10:02

## 2021-12-26 RX ADMIN — SODIUM CHLORIDE 500 ML: 9 INJECTION, SOLUTION INTRAVENOUS at 09:54

## 2021-12-26 RX ADMIN — LEVALBUTEROL HYDROCHLORIDE 1.25 MG: 1.25 SOLUTION RESPIRATORY (INHALATION) at 21:30

## 2021-12-26 RX ADMIN — LEVOTHYROXINE SODIUM 25 MCG: 25 TABLET ORAL at 10:04

## 2021-12-26 RX ADMIN — Medication 10 MG: at 21:11

## 2021-12-26 RX ADMIN — MULTIPLE VITAMINS W/ MINERALS TAB 1 TABLET: TAB at 10:04

## 2021-12-26 RX ADMIN — CEFTAZIDIME 2 G: 2 INJECTION, POWDER, FOR SOLUTION INTRAVENOUS at 18:45

## 2021-12-26 RX ADMIN — LEVALBUTEROL HYDROCHLORIDE 1.25 MG: 1.25 SOLUTION RESPIRATORY (INHALATION) at 09:03

## 2021-12-26 RX ADMIN — NYSTATIN 1000000 UNITS: 100000 SUSPENSION ORAL at 11:53

## 2021-12-26 RX ADMIN — NYSTATIN 1000000 UNITS: 100000 SUSPENSION ORAL at 15:34

## 2021-12-26 RX ADMIN — Medication 800 MG: at 10:03

## 2021-12-26 RX ADMIN — NYSTATIN 1000000 UNITS: 100000 SUSPENSION ORAL at 10:04

## 2021-12-26 RX ADMIN — Medication 1 TABLET: at 10:02

## 2021-12-26 RX ADMIN — MYCOPHENOLATE MOFETIL 1000 MG: 250 CAPSULE ORAL at 21:10

## 2021-12-26 RX ADMIN — Medication 25 MG: at 21:11

## 2021-12-26 RX ADMIN — FLUCONAZOLE, SODIUM CHLORIDE 400 MG: 2 INJECTION INTRAVENOUS at 09:54

## 2021-12-26 RX ADMIN — CALCIUM CARBONATE 600 MG (1,500 MG)-VITAMIN D3 400 UNIT TABLET 1 TABLET: at 10:04

## 2021-12-26 RX ADMIN — PREDNISONE 10 MG: 10 TABLET ORAL at 10:02

## 2021-12-26 RX ADMIN — ACETYLCYSTEINE 2 ML: 200 SOLUTION ORAL; RESPIRATORY (INHALATION) at 13:11

## 2021-12-26 RX ADMIN — LEVALBUTEROL HYDROCHLORIDE 1.25 MG: 1.25 SOLUTION RESPIRATORY (INHALATION) at 13:11

## 2021-12-26 RX ADMIN — Medication 800 MG: at 21:11

## 2021-12-26 RX ADMIN — NYSTATIN 1000000 UNITS: 100000 SUSPENSION ORAL at 21:10

## 2021-12-26 RX ADMIN — FUROSEMIDE 40 MG: 40 TABLET ORAL at 10:04

## 2021-12-26 RX ADMIN — FLUTICASONE PROPIONATE 1 SPRAY: 50 SPRAY, METERED NASAL at 11:16

## 2021-12-26 RX ADMIN — ACETYLCYSTEINE 2 ML: 200 SOLUTION ORAL; RESPIRATORY (INHALATION) at 21:30

## 2021-12-26 RX ADMIN — ACETYLCYSTEINE 2 ML: 200 SOLUTION ORAL; RESPIRATORY (INHALATION) at 09:03

## 2021-12-26 RX ADMIN — CEFTAZIDIME 2 G: 2 INJECTION, POWDER, FOR SOLUTION INTRAVENOUS at 11:54

## 2021-12-26 RX ADMIN — PANTOPRAZOLE SODIUM 40 MG: 40 TABLET, DELAYED RELEASE ORAL at 15:34

## 2021-12-26 RX ADMIN — PANTOPRAZOLE SODIUM 40 MG: 40 TABLET, DELAYED RELEASE ORAL at 10:04

## 2021-12-26 RX ADMIN — Medication 15 ML: at 19:02

## 2021-12-26 RX ADMIN — CALCIUM CARBONATE 600 MG (1,500 MG)-VITAMIN D3 400 UNIT TABLET 1 TABLET: at 18:45

## 2021-12-26 RX ADMIN — ROSUVASTATIN CALCIUM 10 MG: 10 TABLET, FILM COATED ORAL at 10:02

## 2021-12-26 RX ADMIN — TACROLIMUS 2 MG: 1 CAPSULE ORAL at 18:46

## 2021-12-26 RX ADMIN — WARFARIN SODIUM 2 MG: 2 TABLET ORAL at 18:45

## 2021-12-26 ASSESSMENT — ACTIVITIES OF DAILY LIVING (ADL)
ADLS_ACUITY_SCORE: 23
ADLS_ACUITY_SCORE: 23
ADLS_ACUITY_SCORE: 22
ADLS_ACUITY_SCORE: 23
ADLS_ACUITY_SCORE: 22
ADLS_ACUITY_SCORE: 23
ADLS_ACUITY_SCORE: 22
ADLS_ACUITY_SCORE: 23
ADLS_ACUITY_SCORE: 22
ADLS_ACUITY_SCORE: 23
ADLS_ACUITY_SCORE: 22
ADLS_ACUITY_SCORE: 23
ADLS_ACUITY_SCORE: 22
ADLS_ACUITY_SCORE: 23
ADLS_ACUITY_SCORE: 22
ADLS_ACUITY_SCORE: 23
ADLS_ACUITY_SCORE: 23
ADLS_ACUITY_SCORE: 22
ADLS_ACUITY_SCORE: 22

## 2021-12-26 NOTE — PLAN OF CARE
Discharge Planner Post-Acute Rehab SLP:      Discharge Plan: [facility] with fiance , further SLP TBD     Precautions: aspiration precautions, sternal, fall     Current Status:  Communication: WFL.   Cognition: Mild-moderate cognitive impairments with deficits re: attention, exeuctive function, memory, and visuospatial skills, word finding.  Swallow: Regular, thin liquid. Medications to be given orally with thin liquids. Will continue to monitor.      Assessment: Pt participated in functional planning task given prompts via FAVRS. Pt re introduced to strategies to assist in independence and accuracy with task. Pt with excellent sustained attention to details and use of strategies independently. Plan to complete in next session      Other Barriers to Discharge (Family Training, etc):

## 2021-12-26 NOTE — PLAN OF CARE
FOCUS/GOAL  Bowel management, Bladder management, and Mobility    ASSESSMENT, INTERVENTIONS AND CONTINUING PLAN FOR GOAL:  Pt is alert and oriented. Continent of B&B. Uses urinal with staff assistance in emptying. LBM 12/24. CGAx1 with walker and GB. Denied pain. Pt slept throughout the shift. PICC lumens (x3) WDL and heparin locked. . Call light within reach and bed alarms on. Will continue with POC.

## 2021-12-26 NOTE — PLAN OF CARE
Discharge Planner Post-Acute Rehab OT:      Discharge Plan: to [facility] with fiance     Precautions: sternal, fall     Current Status:  ADLs:    Mobility: SBA for in room mobility using walker    Grooming: SBA standing at the sink, chair  near by for intermittent rest breaks, Seated only w/ set up to manage fatigue.     Dressing: UB SBA, SBA for LB donning pants seated and standing to pull up, additional effort due to fatigue- pt may benefit from a reacher to decreased trunk bending.     Bathing: Min A w/shower chair    Toileting: SBA for transfer using walker, SBA for [place]-cares to follow precautions  IADLs: fiance will assist  Vision/Cognition: L visual deficit at baseline and new right eye blind spot. Pt scored 24/30 on the SLUMS indicating mild deficit     Assessment: Pt continues to make improvements in endurance and ADLs safety and indep.Seated rest breaks were very beneficial for pt this day, which increased overall endurance with tx. Pt did run into obstacle on the right side- floor level due to right visual deficit; continued education on scanning environment, radha on R side; currently, continues to require cues. Continue to work on this to build independence. Will benefit from continued activity tolerance and ECT training to maximize ADLs safety and indep in room.      Other Barriers to Discharge (DME, Family Training, etc): poor activity endurance, but improving.

## 2021-12-26 NOTE — PROGRESS NOTES
[facility]    Progress Note - TCU Service        Date of Admission:  12/13/2021    Assessment & Plan             Mr. 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    Today's changes: 12/25/21    No acute events overnight.   No new issues.   On track to discharge next week.   Feeding tube was removed without complications.   Good PO intake.   Breathing is stable.        # ILD and NSIP s/p BSLT on 10/16/2021  # Acute on chronic hypoxic respiratory failure s/p tracheostomy on 10/29/21 and decannulation on 12/8/2021  # Bilateral pleural effusions, improved.  - Pulmonary team following the patient.   - No need for BiPAP, he can continue on CPAP at night and follow-up with sleep medicine as outpatient.      Prophylaxis:   - Bactrim for PJP ppx   - Nystatin for oral candidiasis ppx, 6 month course  - Monthly EBV, CMV labs (last sent on 12/15/21, negative for both)     -Immunosuppression: s/p induction therapy with basiliximab 10/16 (and high dose IV steroid) and 10/20 Continue tacrolimus 2 mg BID. Goal level 8-12.  -MMF 1000 mg BID   -Prednisone 10 mg BID, next taper due 1/2/22   - qM/Th CBC/BMP tacrolimus levels, fluconazole continued per ID     Positive cross match:   Positive DSA: Note that he received two doses of rituximab in June, which is likely contributing to cross match result.  DSA newly positive 11/29 with DQB5 (mfi 2522), decreased (mfi 1873) on 12/6. DSA on 12/12, decreased further to 1703.   - Repeat DSA (12/26) every two weeks     Others:   - Levalbuterol and mucomyst TID  and pulm toilet and chest PT BID  - PT/OT/SLP -@ PM&R team.   - Continue 40 mg of Lasix daily  - Oxycodone discontinued   - Acetaminophen PRN 975mg tid     # Left lung cavitary lesion   # Klebsiella pneumoniae and Pseudomonas fluorescens/putida HAP  #Candida empyema  aspiration vs pseudomonal vs Klebsiella pneumonia related abscess. Indeterminate Quant Gold, but negative tuberculin skin tests on mulitple occurences. S/p BAL on 11/22.Cocci antigen in urine negative, serum, BAL histoplasmosis negative, CRAG negative, respiratory viral panel (-), TB (-), candida growth from bal culture. Transplant ID consulted, saw the patient on 12/21.      - Continue IV Ceftazdime 2 grams Q8H (until 1/18/22), switch to levofloxacin 500mg PO after discharge from ARU  - Continue IV fluconazole till 1/18/22  - Follow up CT chest on 1/18/22 (ordered)  - Follow up with Dr. Abebe on 1/18/2022 @6:00 PM in a virtual visit.      # Hypogammaglobinemia:   IgG previously low at 364 (9/7).  Noted at 265 at time of transplant, s/p IVIG 10/21, repeat IgG (11/17) 198, s/p IVIG (with premedication) 11/18. Repeat IVIG on 12/15 of 365;   Per Pulm: IVIG indicated but ARU  unable to administer IVIG and patient is unable to leave to receive it in an infusion center.  - Repeat IgG ~ 12/30  - Further per Transplant team.     # Encephalopathy, resolved  # Difficulty sleeping  Likely multi-factorial in the setting of stroke, prolonged critical illness.  - Ct Seroquel 25 mg TID prn and 50 mg at bedtime. Wean down as tolerated.   - Melatonin 10 mg at bedtime  - Trazodone 25 mg prn.   - Alert and oriented. Encephalopathy resolved.      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 MRI brain on 10/23 with multifocal subacute infarct within both cerebral hemispheres and left cerebellum. Presumed embolic with afib hx, identified while inpatient. He is currently able to ambulate  with relative ease.     - Continue warfarin per pharmacy protocol  - INR therapeutic    #Upper extremity tremors   -Most likely multifactorial related to previous strokes and tacrolimus. Tremors are chronic.   -Follow-up as outpatient.       # Right subclavian DVT   Diagnosed on 11/4 on ultrasound.  - Continue warfarin per pharmacy protocol  - INR therapeutic.      # DM2 with steroid induced hyperglycemia  -BG stable.   -Continue on current insulin regimen.      # Severe malnutrition in the context of acute on chronic illness   - Feeding tube removed.      # Anxiety and depression   Psychiatry reconsulted and after discussion suggest increasing lexapro. Will discuss with patient regarding starting ritalin in the AM for motivation.  -Ct Lexapro 20 mg daily   -As needed hydroxyzine.     # Rheumatoid arthritis- Dx 5/2021 with + CCP antibody and RF. Previously treated with rituximab. Rheum consulted early in admission. On steroids as noted above.      # SALTY   -Continue on CPAP  -Consider sleep study as outpatient     # HTN  - Continue PTA amlodipine.   Stable.      # Hypothyroidism - TSH 3.17 on 11/19/21.   -Continue PTA levothyroxine 25 mcg daily.     # Recurrent GIB - hgb drop on 10/22 s/p 2 unit pRBC transfusion with EGD on 10/23 with NJ/OG tube trauma with scant oozing. Patient then developed progressive hypotension and ultimately CODE BLUE for GIB in setting of anticoagulation with heparin drip, s/p massive transfusion protocol. EGD on 11/2 with large amount of clotted blood in stomach and area of raised mucosa with small adherent clot near PEG tube site that was clipped, no active bleeding.  Maroon stools 11/3, repeat EGD with extensive old blood in stomach, no active bleeding, small nodular area with prior clips clipped again.  Most recent EGD 11/5 with ulcer noted at PEG tube bumper site, gastritis, and suction marks from G tube. TF noted in G tube drainage bag 11/7, AXR with GJ tube tip projecting over proximal  duodenum. GJ tube exchanged 11/9 by GI.     -PO PPI BID   -HGB stable.    .   # Hypomagnesemia: Suppressed reabsorption 2/2 CNI.  Requiring intermittent IV and PO replacement.  - Mag-Ox 800 mg qAM / 400 mg qPM (increased 12/8)        Diet: Room Service  Regular Diet Adult Thin Liquids (level 0)  Snacks/Supplements Adult: Nepro Oral Supplement; With Meals    DVT Prophylaxis: Warfarin  Watson Catheter: Not present  Fluids: None  Central Lines: None  Code Status: Full Code      Disposition Plan   Expected Discharge: 12/30/2021     Anticipated discharge location:  Awaiting care coordination huddle   Delays: None         Conrad Martin MD  TCU Service  [facility]  Securely message with the Vocera Web Console (learn more here)  Text page via [email or web address] Paging/Directory    ______________________________________________________________________    Interval History     No acute events overnight.   He was pleasant.   No new issues.   Good PO Intake.   Generalized weakness is improving.   No chest pain, palpitations, shortness of breath, nausea, vomit, fever or chills.     Data reviewed today: I reviewed all medications, new labs and imaging results over the last 24 hours. I personally reviewed no images or EKG's today.    Physical Exam   Vital Signs: Temp: 98.6  F (37  C) Temp src: Oral BP: 139/73 Pulse: 100   Resp: 18 SpO2: 96 % O2 Device: None (Room air)    Weight: 145 lbs 9.6 oz  General Appearance:  AAOx4, room air, NAD, appears comfortable  Respiratory: Normal respiratory effort, clear lungs.   Cardiovascular: S1, S2, rrr, no m/r/g  GI: Soft, non-tender, non distended, + BS.  Neuro: Alert, oriented x 3, moving all extremities, mild upper extremity tremors.     Data   Recent Labs   Lab 12/26/21  0906 12/26/21  0616 12/26/21  0610 12/26/21  0214 12/25/21  1001 12/25/21  0643 12/24/21  1315 12/24/21  0612 12/23/21  0815 12/23/21  0714 12/20/21  0824 12/20/21  0752   WBC  --   --    --   --   --   --   --   --   --  8.8  --  9.3   HGB  --   --   --   --   --   --   --   --   --  10.3*  --  9.5*   MCV  --   --   --   --   --   --   --   --   --  89  --  91   PLT  --   --   --   --   --   --   --   --   --  187  --  165   INR  --   --  2.61*  --   --  2.30*  --  2.48*  --  2.43*   < > 2.22*   NA  --   --   --   --   --   --   --   --   --  141  --  142   POTASSIUM  --   --   --   --   --   --   --   --   --  4.5  --  4.2   CHLORIDE  --   --   --   --   --   --   --   --   --  107  --  108   CO2  --   --   --   --   --   --   --   --   --  26  --  27   BUN  --   --   --   --   --   --   --   --   --  29  --  30   CR  --   --   --   --   --   --   --   --   --  0.89  --  0.82   ANIONGAP  --   --   --   --   --   --   --   --   --  8  --  7   ERIK  --   --   --   --   --   --   --   --   --  9.2  --  9.2   * 149*  --  148*   < >  --    < >  --    < > 130*   < > 129*   ALBUMIN  --   --   --   --   --   --   --   --   --  2.8*  --   --    PROTTOTAL  --   --   --   --   --   --   --   --   --  6.0*  --   --    BILITOTAL  --   --   --   --   --   --   --   --   --  0.2  --   --    ALKPHOS  --   --   --   --   --   --   --   --   --  91  --   --    ALT  --   --   --   --   --   --   --   --   --  31  --   --    AST  --   --   --   --   --   --   --   --   --  22  --   --     < > = values in this interval not displayed.

## 2021-12-26 NOTE — PROGRESS NOTES
[facility]   Acute Rehabilitation Unit  Daily progress note    INTERVAL HISTORY  Patient is doing well today. He has no new concerns at this time. Signout received from RN, no new issues. Denies chest pain, abdominal pain, shortness of breath, headache, nausea, vomiting, fevers, chills.    MEDICATIONS  Scheduled meds    acetylcysteine  2 mL Nebulization TID     amLODIPine  5 mg Oral Daily     calcium carbonate 600 mg-vitamin D 400 units  1 tablet Oral or Feeding Tube BID w/meals     cefTAZidime  2 g Intravenous Q8H     escitalopram  20 mg Oral or Feeding Tube Daily     fluconazole  400 mg Intravenous Q24H     fluticasone  1 spray Both Nostrils Daily     folic acid-vit B6-vit B12  1 tablet Oral Daily     furosemide  40 mg Oral Daily     heparin lock flush  5-20 mL Intracatheter Q24H     insulin aspart  1-7 Units Subcutaneous TID AC     insulin glargine  9 Units Subcutaneous QAM     levalbuterol  1.25 mg Nebulization TID     levothyroxine  25 mcg Oral Daily     magnesium oxide  800 mg Oral BID     multivitamin w/minerals  1 tablet Oral Daily     mycophenolate  1,000 mg Oral BID     nystatin  1,000,000 Units Swish & Swallow 4x Daily     pantoprazole  40 mg Oral BID AC     [START ON 1/2/2022] predniSONE  10 mg Oral Daily     predniSONE  10 mg Oral or Feeding Tube BID     [START ON 1/2/2022] predniSONE  7.5 mg Oral QPM     rosuvastatin  10 mg Oral or Feeding Tube Daily     sodium chloride (PF)  10-40 mL Intracatheter Q8H     sulfamethoxazole-trimethoprim  1 tablet Oral or Feeding Tube Daily     tacrolimus  2 mg Oral BID IS     warfarin ANTICOAGULANT  2 mg Oral ONCE at 18:00       PRN meds:  acetaminophen, albuterol, bisacodyl, dextrose, glucose **OR** dextrose **OR** glucagon, heparin lock flush, hydrOXYzine, lidocaine (viscous), magnesium hydroxide, melatonin, naloxone **OR** naloxone **OR** naloxone **OR** naloxone, ondansetron, - MEDICATION INSTRUCTIONS -, prochlorperazine,  "QUEtiapine, senna-docusate, sodium chloride (PF), sodium chloride (PF), traZODone, Warfarin Therapy Reminder, zinc-white petrolatum      PHYSICAL EXAM  /73 (BP Location: Right arm, Patient Position: Semi-Gaines's)   Pulse 100   Temp 98.6  F (37  C) (Oral)   Resp 18   Ht 1.646 m (5' 4.8\")   Wt 66 kg (145 lb 9.6 oz)   SpO2 96%   BMI 24.38 kg/m       Gen: Awake, coooperative NAD  HEENT: EOMI  Cardio: RRR, S1+S2, no m/r/g  Pulm: CTAB  Abd: Soft, non-tender to palpation, bowel sounds present.  Ext: no calf tenderness  Neuro/MSK: alert, oriented, answers questions appropriately, follows commands.      LABS  Recent Labs   Lab 12/26/21  0906 12/26/21  0616 12/26/21  0610 12/26/21  0214 12/25/21  1001 12/25/21  0643 12/24/21  1315 12/24/21  0612 12/23/21  0815 12/23/21  0714 12/20/21  0824 12/20/21  0752   WBC  --   --   --   --   --   --   --   --   --  8.8  --  9.3   HGB  --   --   --   --   --   --   --   --   --  10.3*  --  9.5*   MCV  --   --   --   --   --   --   --   --   --  89  --  91   PLT  --   --   --   --   --   --   --   --   --  187  --  165   INR  --   --  2.61*  --   --  2.30*  --  2.48*  --  2.43*   < > 2.22*   NA  --   --   --   --   --   --   --   --   --  141  --  142   POTASSIUM  --   --   --   --   --   --   --   --   --  4.5  --  4.2   CHLORIDE  --   --   --   --   --   --   --   --   --  107  --  108   CO2  --   --   --   --   --   --   --   --   --  26  --  27   BUN  --   --   --   --   --   --   --   --   --  29  --  30   CR  --   --   --   --   --   --   --   --   --  0.89  --  0.82   ANIONGAP  --   --   --   --   --   --   --   --   --  8  --  7   ERIK  --   --   --   --   --   --   --   --   --  9.2  --  9.2   * 149*  --  148*   < >  --    < >  --    < > 130*   < > 129*   ALBUMIN  --   --   --   --   --   --   --   --   --  2.8*  --   --    PROTTOTAL  --   --   --   --   --   --   --   --   --  6.0*  --   --    BILITOTAL  --   --   --   --   --   --   --   --   --  0.2  --   " --    ALKPHOS  --   --   --   --   --   --   --   --   --  91  --   --    ALT  --   --   --   --   --   --   --   --   --  31  --   --    AST  --   --   --   --   --   --   --   --   --  22  --   --     < > = values in this interval not displayed.     No results found for this or any previous visit (from the past 24 hour(s)).    ASSESSMENT AND PLAN    Edson Thornton is a 56 year old right hand dominant male with a hx of NSIP/ILD associated with rheumatoid lung disease, hypertension, hyperlipidemia, RA who initially presented 9/5/2021 with acute on chronic respiratory failure for transplant work-up s/p bilateral lung transplant (10/16/21) with prolonged hospitalization associated with multifocal bilateral acute/subacute ischemic infarcts of bilateral frontal, occipital lobes.  Postoperative course complicated by prolonged ventilator wean s/p trach and PEG placement (10/29), encephalopathy, new Afib with RVR, BRENNAN, candidemia/Candida empyema, and brief bradycardic arrest with subsequent hypotension in the setting of GI bleed.  [facility] and was decannulated 12/8/2021.  He is currently stable on room air at admission to acute rehab.  He presents with right upper extremity weakness, bilateral dysmetria/tremors, acute on chronic severe deconditioning, dyspnea on exertion, and impairment of ADLs and gait.     Admission to acute inpatient rehab 12/13/2021  .    Impairment group code: Stroke Ischemic 01.3 Bilateral Involvement: embolic CVA affecting B cerebral hemispheres and L cerebellum, as well as s/p bilateral lung transplantation     1. PT, OT 90 minutes of each on a daily basis, in addition to rehab nursing and close management of physiatrist.   2. Impairment of ADLs/IADLs:  OT for 60 min daily to work on upper and lower body self care, dressing, toileting, bathing, energy conservation techniques with use of ADs as needed.   3. Impairment of mobility:   PT for 60 min daily  to work on gait exercises, strengthening, endurance buildup, transfers with use of walker as needed.   4. Impairment of cognition:  SLP for 60 minutes for cognitive evaluation and treatment strategies for higher level cognitive deficits, and memory impairment.  5. Rehab RN to administer medication, patient education on medication taking, VS monitoring, bowel regimen, glucose monitoring and wound care/surgical wound dressing changes and monitoring.   6. Medical Conditions - Hospitalist team consulted, appreciate assistance     Neuro/Psych  #Multifocal acute to subacute ischemic stroke, etiology likely embolic vs perioperative  Initially noted 10/22 and 11/6 with stroke code called for change in exam.  MRI brain 10/23 with infarcts noted in both cerebral hemispheres (R frontal, L corona radiata, bilateral occipital), left cerebellum, presumed associated with new Afib vs perioperative. Bilateral upper extremity paresis (R>L), suspicion for some cortical blindness  - Stroke risk factor management:              -Warfarin anticoagulation for Afib, appreciate pharmacy assistance with dosing              -BP goals: SBP<140              -continue Rosuvastatin 10 mg qday              -Not smoking              -Hgb A1c: 6.6%     #Pain  -Acetaminophen 975mg q8h PRN  -Lidocaine patch  -Menthol patch     #Encephalopathy  #Sleep  -Delirium precautions  -Now completed Seroquel weaning (12/22)  -Melatonin 10mg qhs  -Trazodone 25mg prn     #Mood  #History of anxiety and depression  - PTA Lexapro increased to 10 mg while in acute hospital, increased to 20mg 12/14  -Hydroxyzine as needed         Pulmonary  #ILD, NISP, s/p bilateral lung transplant (10/16/2021)  #Acute on chronic respiratory failure s/p tracheostomy (10/29/21) and decannulation (12/8/21)  #Bilateral Pleural Effusions  -Sternal precautions until 12 wks post op (1/8/2022)  -Immunosuppression:              -Continue tacrolimus  goal 8-12              -Continue MMF 1000mg  BID              -Continue prednisone 10mg BID  -Monitoring lab/imaging              -DSA q2wks              -Monthly Coccidioides Ag per transplant completed 12/17              -CMV, EBV per transplant team              -CXR, CBC, BMP, Tac level M/Th  -Continue Levalbuterol and mucomyst nebs QID  -Aggressive pulmonary toilet and chest PT QID  -Continue Lasix 40mg qday     #SALTY  -PTA CPAP  -Trach site now healed.  IM discussed with pulmonology, no indication for CPAP or BiPAP at this time.     ID  #Immunosuppression Prophylaxsis   -Bactrim x6 months  -Nystatin x6 months     #L lung cavitary lesion  #Hospital-acquired pneumonia with Klebsiella pneumoniae, Pseudomonas fluorescens/putida  Suspected aspiration versus pseudomonal vs Klebsiella pneumonia related to abscess.  CT  11/22 showing new cavitary lesion in left lung base, multifocal bilateral upper lobe groundglass opacity, and new 1.8 cm fluid collection with surrounding fat stranding in left axilla, decreased bilateral loculated pleural effusions.  QuantiFERON gold indeterminate, negative tuberculin skin test on multiple occurrences.  Underwent BAL on 11/22 with cultures growing Klebsiella and Pseudomonas fluorescens/putida, resistant to meropenem.  ID was consulted and feels this is likely fungal.  B-D glucan positive with known candidemia.  Cocci antigen in urine negative, serum histoplasmosis negative, CRAG negative  -Follow-up chest completed CT 12/20   1. Overall decreased (since 11/22/2021 chest CT) size and number of bilateral infectious/inflammatory groundglass opacities. 2. Decreased but persistent left lower lobe cavitary lesion.  -Followed up with Dr. Abebe 12/21, recs appreciated   -Continue fluconazole for two additional weeks (until 1/18/2022) to complete 8 weeks of therapy for Genevieve empyema.    -Continue ceftazidime for four additional weeks until 1/18/2022 however, when ready for discharge he may be discharged on PO levaquin 500 mg daily.     -Repeat CT chest without contrast 1/18/2022 before his appointment with Dr. Abebe (1/18/22 at 6:00pm via virtual visit)   -No need to check monthly Coccidioides AG.      #Invasive Candida albicans candidiasis  Positive Candida blood cultures 10/20, 10/22.  Fungitell positive (399).  TC 10/23 without evidence of endocarditis. Ophthalmic consulted 10/24, benign funduscopic exam.  Candida empyema 10/25.  Repeated pleural effusion fungal cultures and fungal/bacterial blood cultures have been without growth since 11/18.  Initially on micafungin (10/22-10/27) before transition to fluconazole IV  -Continue fluconazole for two additional weeks (until 1/18/2022) to complete 8 weeks of therapy for Genevieve empyema per ID follow up recs 12/21     #hx of HSV  Chronic intermittent infection pretransplant, with recent HSV infection while in acute hospital (crusted lesions along left jaw) s/p 10d treatment until 10/9.     Cardiovascular  #Afib  Initially noted 10/18, converted to NSR with amio ggt. Metoprolol and amiodarone discontinued due to bradycardia. INR: 2.48 12/24  -Warfarin as dosed by pharmacy     #Hypertension  -continue PTA amlodipine 5 mg daily  -also on lasix 40 mg daily      Heme  #Leukocytosis  New leukocytosis to 13.2 on 12/14 -Now returned to normal limits and stable at 8.8 on 12/23      #R subclavian DVT  -Warfarin anticoagulation, pharmacy to dose     #Hypogammaglobulinemia  S/p IVIG  -Repeat IgG 12/15 365  -Repeat ~12/30     Endocrine  #Type 2 diabetes  #Steroid-induced hyperglycemia  -Endocrine consulted while in acute hospital.  Tube feeds on hold starting 12/20.   -  Continue Lantus 9 units daily, sliding scale insulin and carbohydrate coverage- titrate as indicated.      #Hypothyroidism  TSH 3.17 on 11/19  -Continue levothyroxine 25 mcg daily     Renal  #BRENNAN, resolved  Cr 0.89 12/23, stable  -Monitor intermittently     MSK/Rheum  #Rheumatoid arthritis  Previously treated with rituximab.  Rheumatology  familiar and consulted early in admission.  -Steroids as above     GI/FEN  #Severe malnutrition in the context of acute on chronic illness or injury and evidenced by >7.5% weight loss in the past 3 months  -PEG/J tube removed 12/24 at bedside with improved PO intake.  -Continue multivitamin, folate, B6, B12 supplements  -Dietician assistance appreciated       #hx of GI Bleed  Patient with progressive hypotension and CODE BLUE for bradycardia/asystole associated with GI bleed in the setting of anticoagulation, requiring massive transfusion protocol on 10/22.  EGD on 11/2 with clotted blood in stomach, adherent clot near PEG site that was clipped without active bleeding.  Most recent EGD 11/5 with ulcer noted near the PEG bumper site, gastritis, suction marks from the G-tube.  GJ tube was exchanged 11/9 by GI. Hgb 10.3 12/23  -Monitor hemoglobin intermittently.  -Continue PPI BID      Bowel/Bladder  Bowel, continent  Constipation  - Senna-docusate 1-2 tablets BID  - Miralax QDAY PRN  - Bisacodyl suppository 10mg QDAY PRN     Bladder, continent     1. Adjustment to disability:  Clinical psychology to eval and treat as indicated  2. FEN: Regular with thin liquids  3. Bowel: continent, meds as above  4. Bladder: continent  5. DVT Prophylaxis: warfarin  6. GI Prophylaxis: PPI  7. Code: full  8. Disposition: Local housing - [facility]  9. ELOS:  2-3 weeks  10. Rehab prognosis:  good  9. Follow up Appointments on Discharge:                -Neurology 1-2 months after discharge               -Outpatient cardiac/pulmonary Rehab               -Transplant Coordinator               -ID     The patient's assessment and plan was discussed with my attending physician Dr. Aditya Ayala, DO  PGY-4 PM&R Resident

## 2021-12-26 NOTE — PLAN OF CARE
Orientation: Alert and oriented x4  Bowel: Continent of bowel. LBM 12/25  Bladder: Continent of bladder.  Pain: Denies pain.  Ambulation/Transfers: Assist of 1 with walker.  Blood sugars: No insulin required per sliding scale, afternoon insulin not given due to the patient eating breakfast late d/t therapy.  Diet/ Liquids: Regular thin  Tubes/ Lines/ Drains: Triple lumen PICC heparin locked with blood return.

## 2021-12-26 NOTE — PLAN OF CARE
FOCUS/GOAL  Bowel management, Bladder management, Pain management, Mobility, Skin integrity, and Safety management    ASSESSMENT, INTERVENTIONS AND CONTINUING PLAN FOR GOAL:    A/O x4, VSS on RA.  Up w/ CG gait belt and walker to the bathroom.  Continent of bowel and bladder, last BM today.  Pt had a shower tonight.  Regular thin diet, takes his pills well whole.  Bgs were 177 and .  No c/o pain, no prn meds given.  Cont w/ POC.

## 2021-12-27 ENCOUNTER — APPOINTMENT (OUTPATIENT)
Dept: SPEECH THERAPY | Facility: CLINIC | Age: 56
End: 2021-12-27
Attending: PHYSICAL MEDICINE & REHABILITATION
Payer: COMMERCIAL

## 2021-12-27 ENCOUNTER — APPOINTMENT (OUTPATIENT)
Dept: PHYSICAL THERAPY | Facility: CLINIC | Age: 56
End: 2021-12-27
Attending: PHYSICAL MEDICINE & REHABILITATION
Payer: COMMERCIAL

## 2021-12-27 ENCOUNTER — APPOINTMENT (OUTPATIENT)
Dept: OCCUPATIONAL THERAPY | Facility: CLINIC | Age: 56
End: 2021-12-27
Attending: PHYSICAL MEDICINE & REHABILITATION
Payer: COMMERCIAL

## 2021-12-27 ENCOUNTER — APPOINTMENT (OUTPATIENT)
Dept: GENERAL RADIOLOGY | Facility: CLINIC | Age: 56
End: 2021-12-27
Attending: PHYSICIAN ASSISTANT
Payer: COMMERCIAL

## 2021-12-27 LAB
ANION GAP SERPL CALCULATED.3IONS-SCNC: 7 MMOL/L (ref 3–14)
BACTERIA PLR CULT: NO GROWTH
BASOPHILS # BLD AUTO: 0.1 10E3/UL (ref 0–0.2)
BASOPHILS NFR BLD AUTO: 0 %
BUN SERPL-MCNC: 24 MG/DL (ref 7–30)
CALCIUM SERPL-MCNC: 9.5 MG/DL (ref 8.5–10.1)
CHLORIDE BLD-SCNC: 106 MMOL/L (ref 94–109)
CO2 SERPL-SCNC: 26 MMOL/L (ref 20–32)
CREAT SERPL-MCNC: 0.89 MG/DL (ref 0.66–1.25)
EOSINOPHIL # BLD AUTO: 0 10E3/UL (ref 0–0.7)
EOSINOPHIL NFR BLD AUTO: 0 %
ERYTHROCYTE [DISTWIDTH] IN BLOOD BY AUTOMATED COUNT: 13.9 % (ref 10–15)
GFR SERPL CREATININE-BSD FRML MDRD: >90 ML/MIN/1.73M2
GLUCOSE BLD-MCNC: 132 MG/DL (ref 70–99)
GLUCOSE BLDC GLUCOMTR-MCNC: 118 MG/DL (ref 70–99)
GLUCOSE BLDC GLUCOMTR-MCNC: 119 MG/DL (ref 70–99)
GLUCOSE BLDC GLUCOMTR-MCNC: 136 MG/DL (ref 70–99)
GLUCOSE BLDC GLUCOMTR-MCNC: 140 MG/DL (ref 70–99)
GLUCOSE BLDC GLUCOMTR-MCNC: 162 MG/DL (ref 70–99)
HCT VFR BLD AUTO: 35.1 % (ref 40–53)
HGB BLD-MCNC: 11 G/DL (ref 13.3–17.7)
IMM GRANULOCYTES # BLD: 0.3 10E3/UL
IMM GRANULOCYTES NFR BLD: 2 %
INR PPP: 2.71 (ref 0.86–1.14)
LYMPHOCYTES # BLD AUTO: 0.8 10E3/UL (ref 0.8–5.3)
LYMPHOCYTES NFR BLD AUTO: 7 %
MAGNESIUM SERPL-MCNC: 1.7 MG/DL (ref 1.6–2.3)
MCH RBC QN AUTO: 27.4 PG (ref 26.5–33)
MCHC RBC AUTO-ENTMCNC: 31.3 G/DL (ref 31.5–36.5)
MCV RBC AUTO: 88 FL (ref 78–100)
MONOCYTES # BLD AUTO: 0.7 10E3/UL (ref 0–1.3)
MONOCYTES NFR BLD AUTO: 6 %
NEUTROPHILS # BLD AUTO: 9.5 10E3/UL (ref 1.6–8.3)
NEUTROPHILS NFR BLD AUTO: 85 %
NRBC # BLD AUTO: 0 10E3/UL
NRBC BLD AUTO-RTO: 0 /100
PLATELET # BLD AUTO: 177 10E3/UL (ref 150–450)
POTASSIUM BLD-SCNC: 4.4 MMOL/L (ref 3.4–5.3)
RBC # BLD AUTO: 4.01 10E6/UL (ref 4.4–5.9)
SODIUM SERPL-SCNC: 139 MMOL/L (ref 133–144)
TACROLIMUS BLD-MCNC: 12.2 UG/L (ref 5–15)
TME LAST DOSE: NORMAL H
TME LAST DOSE: NORMAL H
WBC # BLD AUTO: 11.3 10E3/UL (ref 4–11)

## 2021-12-27 PROCEDURE — 258N000003 HC RX IP 258 OP 636

## 2021-12-27 PROCEDURE — 83735 ASSAY OF MAGNESIUM: CPT | Performed by: PHYSICIAN ASSISTANT

## 2021-12-27 PROCEDURE — 250N000013 HC RX MED GY IP 250 OP 250 PS 637: Performed by: PHYSICAL MEDICINE & REHABILITATION

## 2021-12-27 PROCEDURE — 80197 ASSAY OF TACROLIMUS: CPT

## 2021-12-27 PROCEDURE — 97110 THERAPEUTIC EXERCISES: CPT | Mod: GP | Performed by: STUDENT IN AN ORGANIZED HEALTH CARE EDUCATION/TRAINING PROGRAM

## 2021-12-27 PROCEDURE — 97535 SELF CARE MNGMENT TRAINING: CPT | Mod: GO | Performed by: STUDENT IN AN ORGANIZED HEALTH CARE EDUCATION/TRAINING PROGRAM

## 2021-12-27 PROCEDURE — 86833 HLA CLASS II HIGH DEFIN QUAL: CPT | Performed by: PHYSICAL MEDICINE & REHABILITATION

## 2021-12-27 PROCEDURE — 250N000013 HC RX MED GY IP 250 OP 250 PS 637: Performed by: INTERNAL MEDICINE

## 2021-12-27 PROCEDURE — 99232 SBSQ HOSP IP/OBS MODERATE 35: CPT | Performed by: INTERNAL MEDICINE

## 2021-12-27 PROCEDURE — 85025 COMPLETE CBC W/AUTO DIFF WBC: CPT | Performed by: PHYSICIAN ASSISTANT

## 2021-12-27 PROCEDURE — 99233 SBSQ HOSP IP/OBS HIGH 50: CPT | Mod: 24 | Performed by: PHYSICAL MEDICINE & REHABILITATION

## 2021-12-27 PROCEDURE — 999N000150 HC STATISTIC PT MED CONFERENCE < 30 MIN

## 2021-12-27 PROCEDURE — 999N000125 HC STATISTIC PATIENT MED CONFERENCE < 30 MIN: Performed by: SPEECH-LANGUAGE PATHOLOGIST

## 2021-12-27 PROCEDURE — 250N000012 HC RX MED GY IP 250 OP 636 PS 637: Performed by: INTERNAL MEDICINE

## 2021-12-27 PROCEDURE — 71046 X-RAY EXAM CHEST 2 VIEWS: CPT | Mod: 26 | Performed by: RADIOLOGY

## 2021-12-27 PROCEDURE — 999N000125 HC STATISTIC PATIENT MED CONFERENCE < 30 MIN: Performed by: STUDENT IN AN ORGANIZED HEALTH CARE EDUCATION/TRAINING PROGRAM

## 2021-12-27 PROCEDURE — 250N000011 HC RX IP 250 OP 636: Performed by: PHYSICIAN ASSISTANT

## 2021-12-27 PROCEDURE — 97130 THER IVNTJ EA ADDL 15 MIN: CPT | Mod: GN | Performed by: SPEECH-LANGUAGE PATHOLOGIST

## 2021-12-27 PROCEDURE — 999N000157 HC STATISTIC RCP TIME EA 10 MIN

## 2021-12-27 PROCEDURE — 36592 COLLECT BLOOD FROM PICC: CPT | Performed by: INTERNAL MEDICINE

## 2021-12-27 PROCEDURE — 250N000011 HC RX IP 250 OP 636: Performed by: PHYSICAL MEDICINE & REHABILITATION

## 2021-12-27 PROCEDURE — 97750 PHYSICAL PERFORMANCE TEST: CPT | Mod: GP | Performed by: STUDENT IN AN ORGANIZED HEALTH CARE EDUCATION/TRAINING PROGRAM

## 2021-12-27 PROCEDURE — 94640 AIRWAY INHALATION TREATMENT: CPT

## 2021-12-27 PROCEDURE — 85610 PROTHROMBIN TIME: CPT | Performed by: INTERNAL MEDICINE

## 2021-12-27 PROCEDURE — 97530 THERAPEUTIC ACTIVITIES: CPT | Mod: GP | Performed by: STUDENT IN AN ORGANIZED HEALTH CARE EDUCATION/TRAINING PROGRAM

## 2021-12-27 PROCEDURE — 71046 X-RAY EXAM CHEST 2 VIEWS: CPT

## 2021-12-27 PROCEDURE — 250N000009 HC RX 250: Performed by: PHYSICAL MEDICINE & REHABILITATION

## 2021-12-27 PROCEDURE — 250N000012 HC RX MED GY IP 250 OP 636 PS 637: Performed by: PHYSICIAN ASSISTANT

## 2021-12-27 PROCEDURE — 97129 THER IVNTJ 1ST 15 MIN: CPT | Mod: GN | Performed by: SPEECH-LANGUAGE PATHOLOGIST

## 2021-12-27 PROCEDURE — 80048 BASIC METABOLIC PNL TOTAL CA: CPT | Performed by: PHYSICIAN ASSISTANT

## 2021-12-27 PROCEDURE — 86832 HLA CLASS I HIGH DEFIN QUAL: CPT | Performed by: PHYSICAL MEDICINE & REHABILITATION

## 2021-12-27 PROCEDURE — 250N000013 HC RX MED GY IP 250 OP 250 PS 637: Performed by: PHYSICIAN ASSISTANT

## 2021-12-27 PROCEDURE — 94640 AIRWAY INHALATION TREATMENT: CPT | Mod: 76

## 2021-12-27 PROCEDURE — 128N000003 HC R&B REHAB

## 2021-12-27 PROCEDURE — 97530 THERAPEUTIC ACTIVITIES: CPT | Mod: GO | Performed by: STUDENT IN AN ORGANIZED HEALTH CARE EDUCATION/TRAINING PROGRAM

## 2021-12-27 PROCEDURE — 250N000009 HC RX 250: Performed by: INTERNAL MEDICINE

## 2021-12-27 RX ORDER — SODIUM CHLORIDE 9 MG/ML
INJECTION, SOLUTION INTRAVENOUS
Status: COMPLETED
Start: 2021-12-27 | End: 2021-12-27

## 2021-12-27 RX ADMIN — SULFAMETHOXAZOLE AND TRIMETHOPRIM 1 TABLET: 400; 80 TABLET ORAL at 08:46

## 2021-12-27 RX ADMIN — LEVALBUTEROL HYDROCHLORIDE 1.25 MG: 1.25 SOLUTION RESPIRATORY (INHALATION) at 09:36

## 2021-12-27 RX ADMIN — PREDNISONE 10 MG: 10 TABLET ORAL at 08:46

## 2021-12-27 RX ADMIN — NYSTATIN 1000000 UNITS: 100000 SUSPENSION ORAL at 12:54

## 2021-12-27 RX ADMIN — PANTOPRAZOLE SODIUM 40 MG: 40 TABLET, DELAYED RELEASE ORAL at 08:46

## 2021-12-27 RX ADMIN — MYCOPHENOLATE MOFETIL 1000 MG: 250 CAPSULE ORAL at 08:46

## 2021-12-27 RX ADMIN — PANTOPRAZOLE SODIUM 40 MG: 40 TABLET, DELAYED RELEASE ORAL at 16:32

## 2021-12-27 RX ADMIN — CEFTAZIDIME 2 G: 2 INJECTION, POWDER, FOR SOLUTION INTRAVENOUS at 12:54

## 2021-12-27 RX ADMIN — Medication 5 ML: at 06:33

## 2021-12-27 RX ADMIN — FUROSEMIDE 40 MG: 40 TABLET ORAL at 08:46

## 2021-12-27 RX ADMIN — Medication 1.5 MG: at 17:22

## 2021-12-27 RX ADMIN — Medication 800 MG: at 20:14

## 2021-12-27 RX ADMIN — CEFTAZIDIME 2 G: 2 INJECTION, POWDER, FOR SOLUTION INTRAVENOUS at 18:29

## 2021-12-27 RX ADMIN — Medication 1 TABLET: at 08:46

## 2021-12-27 RX ADMIN — NYSTATIN 1000000 UNITS: 100000 SUSPENSION ORAL at 08:46

## 2021-12-27 RX ADMIN — LEVALBUTEROL HYDROCHLORIDE 1.25 MG: 1.25 SOLUTION RESPIRATORY (INHALATION) at 20:22

## 2021-12-27 RX ADMIN — NYSTATIN 1000000 UNITS: 100000 SUSPENSION ORAL at 20:14

## 2021-12-27 RX ADMIN — ESCITALOPRAM OXALATE 20 MG: 20 TABLET ORAL at 08:46

## 2021-12-27 RX ADMIN — MYCOPHENOLATE MOFETIL 1000 MG: 250 CAPSULE ORAL at 20:13

## 2021-12-27 RX ADMIN — NYSTATIN 1000000 UNITS: 100000 SUSPENSION ORAL at 16:32

## 2021-12-27 RX ADMIN — INSULIN GLARGINE 9 UNITS: 100 INJECTION, SOLUTION SUBCUTANEOUS at 08:52

## 2021-12-27 RX ADMIN — LEVALBUTEROL HYDROCHLORIDE 1.25 MG: 1.25 SOLUTION RESPIRATORY (INHALATION) at 12:41

## 2021-12-27 RX ADMIN — SODIUM CHLORIDE: 9 INJECTION, SOLUTION INTRAVENOUS at 12:55

## 2021-12-27 RX ADMIN — ACETYLCYSTEINE 2 ML: 200 SOLUTION ORAL; RESPIRATORY (INHALATION) at 09:36

## 2021-12-27 RX ADMIN — FLUCONAZOLE, SODIUM CHLORIDE 400 MG: 2 INJECTION INTRAVENOUS at 09:07

## 2021-12-27 RX ADMIN — ROSUVASTATIN CALCIUM 10 MG: 10 TABLET, FILM COATED ORAL at 08:46

## 2021-12-27 RX ADMIN — ACETYLCYSTEINE 2 ML: 200 SOLUTION ORAL; RESPIRATORY (INHALATION) at 12:41

## 2021-12-27 RX ADMIN — Medication 15 ML: at 12:55

## 2021-12-27 RX ADMIN — TACROLIMUS 2 MG: 1 CAPSULE ORAL at 17:22

## 2021-12-27 RX ADMIN — AMLODIPINE BESYLATE 5 MG: 5 TABLET ORAL at 08:46

## 2021-12-27 RX ADMIN — LEVOTHYROXINE SODIUM 25 MCG: 25 TABLET ORAL at 08:46

## 2021-12-27 RX ADMIN — CALCIUM CARBONATE 600 MG (1,500 MG)-VITAMIN D3 400 UNIT TABLET 1 TABLET: at 08:46

## 2021-12-27 RX ADMIN — PREDNISONE 10 MG: 10 TABLET ORAL at 20:14

## 2021-12-27 RX ADMIN — CEFTAZIDIME 2 G: 2 INJECTION, POWDER, FOR SOLUTION INTRAVENOUS at 02:56

## 2021-12-27 RX ADMIN — FLUTICASONE PROPIONATE 1 SPRAY: 50 SPRAY, METERED NASAL at 08:52

## 2021-12-27 RX ADMIN — TACROLIMUS 2 MG: 1 CAPSULE ORAL at 08:46

## 2021-12-27 RX ADMIN — MULTIPLE VITAMINS W/ MINERALS TAB 1 TABLET: TAB at 08:46

## 2021-12-27 RX ADMIN — ACETYLCYSTEINE 2 ML: 200 SOLUTION ORAL; RESPIRATORY (INHALATION) at 20:22

## 2021-12-27 RX ADMIN — Medication 800 MG: at 08:46

## 2021-12-27 RX ADMIN — Medication 5 ML: at 19:13

## 2021-12-27 RX ADMIN — CALCIUM CARBONATE 600 MG (1,500 MG)-VITAMIN D3 400 UNIT TABLET 1 TABLET: at 17:22

## 2021-12-27 ASSESSMENT — ACTIVITIES OF DAILY LIVING (ADL)
ADLS_ACUITY_SCORE: 22
ADLS_ACUITY_SCORE: 25
ADLS_ACUITY_SCORE: 24
ADLS_ACUITY_SCORE: 22
ADLS_ACUITY_SCORE: 25
ADLS_ACUITY_SCORE: 22
ADLS_ACUITY_SCORE: 22
ADLS_ACUITY_SCORE: 24
ADLS_ACUITY_SCORE: 22
ADLS_ACUITY_SCORE: 25
ADLS_ACUITY_SCORE: 25
ADLS_ACUITY_SCORE: 22
ADLS_ACUITY_SCORE: 25
ADLS_ACUITY_SCORE: 24
ADLS_ACUITY_SCORE: 24

## 2021-12-27 NOTE — PROGRESS NOTES
[facility]    Progress Note - TCU Service        Date of Admission:  12/13/2021    Assessment & Plan             Mr. 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     Today's changes: 12/25/21   No acute events overnight.   No new issues.   On track to discharge next week.   Good PO intake.   Breathing is stable.   DSA ordered      # ILD and NSIP s/p BSLT on 10/16/2021  # Acute on chronic hypoxic respiratory failure s/p tracheostomy on 10/29/21 and decannulation on 12/8/2021  # Bilateral pleural effusions, improved.  - Pulmonary team following the patient.   - No need for BiPAP, he can continue on CPAP at night and follow-up with sleep medicine as outpatient.     Prophylaxis:   - Bactrim for PJP ppx   - Nystatin for oral candidiasis ppx, 6 month course  - Monthly EBV labs (last sent on 12/15/21)    -Immunosuppression: s/p induction therapy with basiliximab 10/16 (and high dose IV steroid) and 10/20 Continue tacrolimus 2 mg BID. Goal level 8-12.  -MMF 1000 mg BID   -Prednisone 10 mg BID, next taper due 1/2/22   - qM/Th CBC/BMP tacrolimus levels, fluconazole continued per ID     Positive cross match:   Positive DSA: Note that he received two doses of rituximab in June, which is likely contributing to cross match result.  DSA newly positive 11/29 with DQB5 (mfi 2522), decreased (mfi 1873) on 12/6. DSA on 12/12, decreased further to 1703.   - Repeat DSA every two weeks (ordered for today)    Others:   - Levalbuterol and mucomyst TID and pulm toilet and chest PT BID  - PT/OT/SLP -@  PM&R team.   - Continue 40 mg of Lasix daily  - Oxycodone discontinued   - Acetaminophen PRN 975mg tid     # Left lung cavitary lesion   # Klebsiella pneumoniae and Pseudomonas fluorescens/putida HAP  #Candida empyema  aspiration vs pseudomonal vs Klebsiella pneumonia related abscess. Indeterminate Quant Gold, but negative tuberculin skin tests on mulitple occurences. S/p BAL on 11/22.Cocci antigen in urine negative, serum, BAL histoplasmosis negative, CRAG negative, respiratory viral panel (-), TB (-), candida growth from bal culture. Transplant ID consulted, saw the patient on 12/21.      - Continue IV Ceftazdime 2 grams Q8H (until 1/18/22), switch to levofloxacin 500mg PO after discharge from ARU  - Continue IV fluconazole till 1/18/22  - Follow up CT chest on 1/18/22 (ordered)  - Follow up with Dr. Abebe on 1/18/2022 @6:00 PM in a virtual visit.      # Hypogammaglobinemia:   IgG previously low at 364 (9/7).  Noted at 265 at time of transplant, s/p IVIG 10/21, repeat IgG (11/17) 198, s/p IVIG (with premedication) 11/18. Repeat IVIG on 12/15 of 365;   Per Pulm: IVIG indicated but ARU  unable to administer IVIG and patient is unable to leave to receive it in an infusion center.  - Repeat IgG ~ 12/30  - Further per Transplant team.     # Encephalopathy, resolved  # Difficulty sleeping  Likely multi-factorial in the setting of stroke, prolonged critical illness.  - Ct Seroquel 25 mg TID prn and 50 mg at bedtime. Wean down as tolerated.   - Melatonin 10 mg at bedtime  - Trazodone 25 mg prn.   - Alert and oriented. Encephalopathy resolved.       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 MRI brain on 10/23 with multifocal subacute infarct within both cerebral hemispheres and left cerebellum. Presumed embolic with afib hx, identified while inpatient. He is currently able to ambulate with relative ease.     - Continue warfarin per  pharmacy protocol  - INR therapeutic     #Upper extremity tremors   -Most likely multifactorial related to previous strokes and tacrolimus. Tremors are chronic.   -Follow-up as outpatient.       # Right subclavian DVT   Diagnosed on 11/4 on ultrasound.  - Continue warfarin per pharmacy protocol  - INR therapeutic.      # DM2 with steroid induced hyperglycemia  -BG stable.   -Continue on current insulin regimen.      # Severe malnutrition in the context of acute on chronic illness   - Feeding tube removed.      # Anxiety and depression   Psychiatry reconsulted and after discussion suggest increasing lexapro. Will discuss with patient regarding starting ritalin in the AM for motivation.  -Ct Lexapro 20 mg daily   -As needed hydroxyzine.     # Rheumatoid arthritis- Dx 5/2021 with + CCP antibody and RF. Previously treated with rituximab. Rheum consulted early in admission. On steroids as noted above.      # SALTY   -Continue on CPAP  -Consider sleep study as outpatient     # HTN  - Continue PTA amlodipine.   Stable.      # Hypothyroidism - TSH 3.17 on 11/19/21.   -Continue PTA levothyroxine 25 mcg daily.     # Recurrent GIB - hgb drop on 10/22 s/p 2 unit pRBC transfusion with EGD on 10/23 with NJ/OG tube trauma with scant oozing. Patient then developed progressive hypotension and ultimately CODE BLUE for GIB in setting of anticoagulation with heparin drip, s/p massive transfusion protocol. EGD on 11/2 with large amount of clotted blood in stomach and area of raised mucosa with small adherent clot near PEG tube site that was clipped, no active bleeding.  Maroon stools 11/3, repeat EGD with extensive old blood in stomach, no active bleeding, small nodular area with prior clips clipped again.  Most recent EGD 11/5 with ulcer noted at PEG tube bumper site, gastritis, and suction marks from G tube. TF noted in G tube drainage bag 11/7, AXR with GJ tube tip projecting over proximal duodenum. GJ tube exchanged 11/9 by  GI.     -PO PPI BID   -HGB stable.    .   # Hypomagnesemia: Suppressed reabsorption 2/2 CNI.  Requiring intermittent IV and PO replacement.  - Mag-Ox 800 mg qAM / 400 mg qPM (increased 12/8)      Diet: Room Service  Regular Diet Adult Thin Liquids (level 0)  Snacks/Supplements Adult: Nepro Oral Supplement; With Meals    DVT Prophylaxis: Warfarin  Watson Catheter: Not present  Fluids: None  Central Lines: None  Code Status: Full Code      Disposition Plan   Expected Discharge: 12/30/2021     Anticipated discharge location:  Awaiting care coordination huddle     The patient's care was discussed with the Attending Physician, Dr. Pollard.    Sherri Layne MD  TCU Service  [facility]  Securely message with the Vocera Web Console (learn more here)  Text page via [employer] Paging/Directory        Clinically Significant Risk Factors Present on Admission                    ______________________________________________________________________    Interval History    No acute events overnight. Yesterday's notes reviewed AM. Patient was seen while working with OT. Pleasant and cooperative, no new concerns today, ordered DSA.     Data reviewed today: I reviewed all medications, new labs and imaging results over the last 24 hours. I personally reviewed no images or EKG's today.    Physical Exam   Vital Signs: Temp: 97.7  F (36.5  C) Temp src: Oral BP: 122/67 Pulse: 89   Resp: 20 SpO2: 98 % O2 Device: None (Room air)    Weight: 145 lbs 9.6 oz  General Appearance:  AAOx4, room air, NAD, appears comfortable  Respiratory: Normal respiratory effort, clear lungs.   Cardiovascular: S1, S2, rrr, no m/r/g  GI: Soft, non-tender, non distended, + BS.  Neuro: Alert, oriented x 3, moving all extremities, mild upper extremity tremors.     Data   Recent Labs   Lab 12/27/21  1207 12/27/21  0828 12/27/21  0641 12/26/21  0616 12/26/21  0610 12/25/21  1001 12/25/21  0643 12/23/21  0815  12/23/21  0714   WBC  --   --  11.3*  --   --   --   --   --  8.8   HGB  --   --  11.0*  --   --   --   --   --  10.3*   MCV  --   --  88  --   --   --   --   --  89   PLT  --   --  177  --   --   --   --   --  187   INR  --   --  2.71*  --  2.61*  --  2.30*   < > 2.43*   NA  --   --  139  --   --   --   --   --  141   POTASSIUM  --   --  4.4  --   --   --   --   --  4.5   CHLORIDE  --   --  106  --   --   --   --   --  107   CO2  --   --  26  --   --   --   --   --  26   BUN  --   --  24  --   --   --   --   --  29   CR  --   --  0.89  --   --   --   --   --  0.89   ANIONGAP  --   --  7  --   --   --   --   --  8   ERIK  --   --  9.5  --   --   --   --   --  9.2   * 119* 132*   < >  --    < >  --    < > 130*   ALBUMIN  --   --   --   --   --   --   --   --  2.8*   PROTTOTAL  --   --   --   --   --   --   --   --  6.0*   BILITOTAL  --   --   --   --   --   --   --   --  0.2   ALKPHOS  --   --   --   --   --   --   --   --  91   ALT  --   --   --   --   --   --   --   --  31   AST  --   --   --   --   --   --   --   --  22    < > = values in this interval not displayed.

## 2021-12-27 NOTE — PLAN OF CARE
"Discharge Planner Post-Acute Rehab PT:      Discharge Plan: [facility] with [facility] wheatT, then home to [facility] [place], [place]     Precautions: Falls, dyspnea on exertion     Current Status:  Bed Mobility: Min A  Transfer: SBA with WW  Gait:  ft with WW  Stairs: 5 step ups with min A  Balance: Multiple LOB during session and rapid sitting, recommending CGA at all times  30 seconds STS from 20\" mat using hands on FWW 12/19/21 = 6, OMNI 3/10    STS from 20\" mat 12/27 = 8x with hands on  Thighs , OMNI 5/10  TUG 12/19/21, CGA with FWW = 20 seconds, OMNI 7/10   12/27/21 = 15.8 seconds, OMNI 2/10, close SBA with FWW     Assessment: Pt reporting having a harder day, but remains motivated to participate to best of his ability. AM session limited d/t toileting and RT giving nebulizer- Re-test TUG and 30 sec STS, both improved (see above). PM session introduced 4WW, completed short trial walking to segura and in/out bathroom. Pt using safely and states he prefers vs FWW.     Other Barriers to Discharge (DME, Family Training, etc):   Significant deconditioning  4WW  Family training for stairs, car transfer, energy conservation concepts, fall prevention and floor recovery  "

## 2021-12-27 NOTE — PROGRESS NOTES
[facility]   Acute Rehabilitation Unit  Daily progress note    INTERVAL HISTORY  Weekend notes reviewed.  No acute events and this morning Bret has no new concerns or complaints.  Feels dyspnea and appetite are about the same, but continues to make progress with endurance and therapies.  Denies chest pain, and is doing well after PEG tube removed.  For full functional updates, see team rounds note from today.     MEDICATIONS  Scheduled meds    acetylcysteine  2 mL Nebulization TID     amLODIPine  5 mg Oral Daily     calcium carbonate 600 mg-vitamin D 400 units  1 tablet Oral or Feeding Tube BID w/meals     cefTAZidime  2 g Intravenous Q8H     escitalopram  20 mg Oral or Feeding Tube Daily     fluconazole  400 mg Intravenous Q24H     fluticasone  1 spray Both Nostrils Daily     folic acid-vit B6-vit B12  1 tablet Oral Daily     furosemide  40 mg Oral Daily     heparin lock flush  5-20 mL Intracatheter Q24H     insulin aspart  1-7 Units Subcutaneous TID AC     insulin glargine  9 Units Subcutaneous QAM     levalbuterol  1.25 mg Nebulization TID     levothyroxine  25 mcg Oral Daily     magnesium oxide  800 mg Oral BID     multivitamin w/minerals  1 tablet Oral Daily     mycophenolate  1,000 mg Oral BID     nystatin  1,000,000 Units Swish & Swallow 4x Daily     pantoprazole  40 mg Oral BID AC     [START ON 1/2/2022] predniSONE  10 mg Oral Daily     predniSONE  10 mg Oral or Feeding Tube BID     [START ON 1/2/2022] predniSONE  7.5 mg Oral QPM     rosuvastatin  10 mg Oral or Feeding Tube Daily     sodium chloride (PF)  10-40 mL Intracatheter Q8H     sulfamethoxazole-trimethoprim  1 tablet Oral or Feeding Tube Daily     tacrolimus  2 mg Oral BID IS     warfarin ANTICOAGULANT  1.5 mg Oral ONCE at 18:00       PRN meds:  acetaminophen, albuterol, bisacodyl, dextrose, glucose **OR** dextrose **OR** glucagon, heparin lock flush, hydrOXYzine, lidocaine (viscous), magnesium hydroxide,  "melatonin, naloxone **OR** naloxone **OR** naloxone **OR** naloxone, ondansetron, - MEDICATION INSTRUCTIONS -, prochlorperazine, QUEtiapine, senna-docusate, sodium chloride (PF), sodium chloride (PF), traZODone, Warfarin Therapy Reminder, zinc-white petrolatum      PHYSICAL EXAM  /67 (BP Location: Right arm)   Pulse 89   Temp 97.7  F (36.5  C) (Oral)   Resp 20   Ht 1.646 m (5' 4.8\")   Wt 66 kg (145 lb 9.6 oz)   SpO2 98%   BMI 24.38 kg/m       Gen: Awake, coooperative NAD, participating in OT  HEENT: Trach site now healed  Cardio: RRR, S1+S2, no m/r/g  Pulm: Non-labored breathing at rest, with lungs clear diminished at bases.  Abd: Soft, non-tender to palpation, bowel sounds present. PEG tube now removed.  Ext: No edema in lower or upper extremities b/l, no calf tenderness  Neuro/MSK: alert, oriented, answers questions appropriately, follows commands.      LABS  Recent Labs   Lab 12/27/21  0828 12/27/21  0641 12/27/21  0202 12/26/21  0616 12/26/21  0610 12/25/21  1001 12/25/21  0643 12/23/21  0815 12/23/21  0714   WBC  --  11.3*  --   --   --   --   --   --  8.8   HGB  --  11.0*  --   --   --   --   --   --  10.3*   MCV  --  88  --   --   --   --   --   --  89   PLT  --  177  --   --   --   --   --   --  187   INR  --  2.71*  --   --  2.61*  --  2.30*   < > 2.43*   NA  --  139  --   --   --   --   --   --  141   POTASSIUM  --  4.4  --   --   --   --   --   --  4.5   CHLORIDE  --  106  --   --   --   --   --   --  107   CO2  --  26  --   --   --   --   --   --  26   BUN  --  24  --   --   --   --   --   --  29   CR  --  0.89  --   --   --   --   --   --  0.89   ANIONGAP  --  7  --   --   --   --   --   --  8   ERIK  --  9.5  --   --   --   --   --   --  9.2   * 132* 136*   < >  --    < >  --    < > 130*   ALBUMIN  --   --   --   --   --   --   --   --  2.8*   PROTTOTAL  --   --   --   --   --   --   --   --  6.0*   BILITOTAL  --   --   --   --   --   --   --   --  0.2   ALKPHOS  --   --   --   --   " --   --   --   --  91   ALT  --   --   --   --   --   --   --   --  31   AST  --   --   --   --   --   --   --   --  22    < > = values in this interval not displayed.     No results found for this or any previous visit (from the past 24 hour(s)).    ASSESSMENT AND PLAN    Edson Thornton is a 56 year old right hand dominant male with a hx of NSIP/ILD associated with rheumatoid lung disease, hypertension, hyperlipidemia, RA who initially presented 9/5/2021 with acute on chronic respiratory failure for transplant work-up s/p bilateral lung transplant (10/16/21) with prolonged hospitalization associated with multifocal bilateral acute/subacute ischemic infarcts of bilateral frontal, occipital lobes.  Postoperative course complicated by prolonged ventilator wean s/p trach and PEG placement (10/29), encephalopathy, new Afib with RVR, BRENNAN, candidemia/Candida empyema, and brief bradycardic arrest with subsequent hypotension in the setting of GI bleed.  [facility] and was decannulated 12/8/2021.  He is currently stable on room air at admission to acute rehab.  He presents with right upper extremity weakness, bilateral dysmetria/tremors, acute on chronic severe deconditioning, dyspnea on exertion, and impairment of ADLs and gait.     Admission to acute inpatient rehab 12/13/2021  .    Impairment group code: Stroke Ischemic 01.3 Bilateral Involvement: embolic CVA affecting B cerebral hemispheres and L cerebellum, as well as s/p bilateral lung transplantation     1. PT, OT, SLP for 60 minutes of each on a daily basis, in addition to rehab nursing and close management of physiatrist.   2. Impairment of ADLs/IADLs:  OT for 60 min daily to work on upper and lower body self care, dressing, toileting, bathing, energy conservation techniques with use of ADs as needed.   3. Impairment of mobility:   PT for 60 min daily to work on gait exercises, strengthening, endurance buildup, transfers  with use of walker as needed.   4. Impairment of cognition:  SLP for 60 minutes for cognitive evaluation and treatment strategies for higher level cognitive deficits, and memory impairment.  5. Rehab RN to administer medication, patient education on medication taking, VS monitoring, bowel regimen, glucose monitoring and wound care/surgical wound dressing changes and monitoring.   6. Medical Conditions - Hospitalist team consulted, appreciate assistance     Neuro/Psych  #Multifocal acute to subacute ischemic stroke, etiology likely embolic vs perioperative  Initially noted 10/22 and 11/6 with stroke code called for change in exam.  MRI brain 10/23 with infarcts noted in both cerebral hemispheres (R frontal, L corona radiata, bilateral occipital), left cerebellum, presumed associated with new Afib vs perioperative. Bilateral upper extremity paresis (R>L), suspicion for some cortical blindness  - Stroke risk factor management:              -Warfarin anticoagulation for Afib, appreciate pharmacy assistance with dosing              -BP goals: SBP<140              -continue Rosuvastatin 10 mg qday              -Not smoking              -Hgb A1c: 6.6%     #Pain  -Acetaminophen 975mg q8h PRN  -Lidocaine patch  -Menthol patch     #Encephalopathy  #Sleep  -Delirium precautions  -Now completed Seroquel weaning (12/22)  -Melatonin 10mg qhs  -Trazodone 25mg prn     #Mood  #History of anxiety and depression  - PTA Lexapro increased to 10 mg while in acute hospital, increased to 20mg 12/14  -Hydroxyzine as needed         Pulmonary  #ILD, NISP, s/p bilateral lung transplant (10/16/2021)  #Acute on chronic respiratory failure s/p tracheostomy (10/29/21) and decannulation (12/8/21)  #Bilateral Pleural Effusions  -Sternal precautions until 12 wks post op (1/8/2022)  -Immunosuppression:              -Continue tacrolimus  goal 8-12              -Continue MMF 1000mg BID              -Continue prednisone - Taper plan per pulmonary.   Currently on 10 mg 10 mg BID (next taper 1/2/22)  -Monitoring lab/imaging              -DSA q2wks              -Coccidioides Ag per transplant completed 12/17 - Per ID, no further need to check monthly              -CMV, EBV per transplant team              -CXR, CBC, BMP, Tac level M/Th  -Continue Levalbuterol TID  -Pulmonary toilet, encourage IS  -Continue Lasix 40mg qday     #SALTY  -Trach site now healed.  IM discussed with pulmonology, no indication for CPAP or BiPAP at this time. Consider outpatient sleep study if indicated.    ID  #Immunosuppression Prophylaxsis   -Bactrim x6 months  -Nystatin x6 months     #L lung cavitary lesion  #Hospital-acquired pneumonia with Klebsiella pneumoniae, Pseudomonas fluorescens/putida  Suspected aspiration versus pseudomonal vs Klebsiella pneumonia related to abscess.  CT  11/22 showing new cavitary lesion in left lung base, multifocal bilateral upper lobe groundglass opacity, and new 1.8 cm fluid collection with surrounding fat stranding in left axilla, decreased bilateral loculated pleural effusions.  QuantiFERON gold indeterminate, negative tuberculin skin test on multiple occurrences.  Underwent BAL on 11/22 with cultures growing Klebsiella and Pseudomonas fluorescens/putida, resistant to meropenem.  ID was consulted and feels this is likely fungal.  B-D glucan positive with known candidemia.  Cocci antigen in urine negative, serum histoplasmosis negative, CRAG negative  -Follow-up chest completed CT 12/20   1. Overall decreased (since 11/22/2021 chest CT) size and number of bilateral infectious/inflammatory groundglass opacities. 2. Decreased but persistent left lower lobe cavitary lesion.  -Followed up with Dr. Abebe 12/21, recs appreciated   -Continue fluconazole for two additional weeks (until 1/18/2022) to complete 8 weeks of therapy for Genevieve empyema.    -Continue ceftazidime for four additional weeks until 1/18/2022 however, when ready for discharge he may be  discharged on PO levaquin 500 mg daily.    -Repeat CT chest without contrast 1/18/2022 before his appointment with Dr. Abebe (1/18/22 at 6:00pm via virtual visit)   -No need to check monthly Coccidioides AG.      #Invasive Candida albicans candidiasis  Positive Candida blood cultures 10/20, 10/22.  Fungitell positive (399).  TC 10/23 without evidence of endocarditis. Ophthalmic consulted 10/24, benign funduscopic exam.  Candida empyema 10/25.  Repeated pleural effusion fungal cultures and fungal/bacterial blood cultures have been without growth since 11/18.  Initially on micafungin (10/22-10/27) before transition to fluconazole IV  -Continue fluconazole for two additional weeks (until 1/18/2022) to complete 8 weeks of therapy for Genevieve empyema per ID follow up recs 12/21     #hx of HSV  Chronic intermittent infection pretransplant, with recent HSV infection while in acute hospital (crusted lesions along left jaw) s/p 10d treatment until 10/9.     Cardiovascular  #Afib  Initially noted 10/18, converted to NSR with amio ggt. Metoprolol and amiodarone discontinued due to bradycardia. INR: 2.71 12/27  -Warfarin as dosed by pharmacy     #Hypertension  -continue PTA amlodipine 5 mg daily  -also on lasix 40 mg daily      Heme  #Leukocytosis  Previously 13.2 on 12/14, then decreased but today is mildly elevated at 11.3.  No signs of infection at this time.  Monitor only for now.  If spikes a fever or shows signs of infection, will do full workup.       #R subclavian DVT  -Warfarin anticoagulation, pharmacy to dose     #Hypogammaglobulinemia  S/p IVIG  -Repeat IgG 12/15 365  -Repeat ~12/30     Endocrine  #Type 2 diabetes  #Steroid-induced hyperglycemia  -Endocrine consulted while in acute hospital.  Tube feeds on hold starting 12/20 and PEG/J tube now removed.    -  Continue Lantus 9 units daily, sliding scale insulin and carbohydrate coverage- titrate as indicated.      #Hypothyroidism  TSH 3.17 on 11/19  -Continue  levothyroxine 25 mcg daily     Renal  #BRENNAN, resolved  Cr 0.89 12/27, stable  -Monitor intermittently     MSK/Rheum  #Rheumatoid arthritis  Previously treated with rituximab.  Rheumatology familiar and consulted early in admission.  -Steroids as above     GI/FEN  #Severe malnutrition in the context of acute on chronic illness or injury and evidenced by >7.5% weight loss in the past 3 months  -PEG/J tube removed 12/24 at bedside with improved PO intake.  -Continue multivitamin, folate, B6, B12 supplements  -Dietician assistance appreciated       #hx of GI Bleed  Patient with progressive hypotension and CODE BLUE for bradycardia/asystole associated with GI bleed in the setting of anticoagulation, requiring massive transfusion protocol on 10/22.  EGD on 11/2 with clotted blood in stomach, adherent clot near PEG site that was clipped without active bleeding.  Most recent EGD 11/5 with ulcer noted near the PEG bumper site, gastritis, suction marks from the G-tube.  GJ tube was exchanged 11/9 by GI. Hgb improved to 11 on 12/27  -Monitor hemoglobin intermittently.  -Continue PPI BID      Bowel/Bladder  Bowel, continent  Constipation  - Senna-docusate 1-2 tablets BID  - Miralax QDAY PRN  - Bisacodyl suppository 10mg QDAY PRN     Bladder, continent     1. Adjustment to disability:  Clinical psychology to eval and treat as indicated  2. FEN: Regular with thin liquids  3. Bowel: continent, meds as above  4. Bladder: continent  5. DVT Prophylaxis: warfarin  6. GI Prophylaxis: PPI  7. Code: full  8. Disposition: Local housing - [facility]  9. ELOS:  Tentative discharge date 12/30/21  10. Rehab prognosis:  good  9. Follow up Appointments on Discharge:                -Neurology 1-2 months after discharge               -Outpatient cardiac/pulmonary Rehab               -Transplant Coordinator               -AISHA Coronado MD  Department of Rehabilitation Medicine        Time Spent on this Encounter   I, Lan Coronado, spent a  total of 35 minutes face-to-face or managing the care of Edson Thornton. Over 50% of my time on the unit was spent counseling the patient and coordinating care. See note for details.

## 2021-12-27 NOTE — PLAN OF CARE
"Acute Rehab Care Conference/Team Rounds      Type: Team Rounds    Present: Lan Coronado MD; Susan San PA-C; Moira Terrell, RN; Liliya Bowen, SLP; Lydia Hogan, OTR/L; Christos Rush, PT; John NO, Zenaida Washington, RD      Discharge Barriers/Treatment/Education    Rehab Diagnosis: Embolic CVA     Active Medical Co-morbidities/Prognosis: ILD s/p b/l lung transplants, SALTY, HTN, HLD, hypogammaglobulinemia, anxiety, depression, dyspnea, pulmonary effusion, DVT, GI bleed, encephalopathy, tremors, a, fib with RVR, BRENNAN, candidate empyema, candidemia, HCAP, hypothyroidism, DM II, RA, malnutrition s/p PEG tube    Safety: CGA  with walker, used call light appropriately.    Pain: denies pain    Medications, Skin, Tubes/Lines: Takes medication whole, PICC line to LUE triple lumen, intact and patent    Swallowing/Nutrition: PEG tube now removed    Bowel/Bladder: Continent of Bladder, used urinal independently at night, LBM 12/26/2021    Psychosocial: [facility] [place] house with spouse. 55 y/o male, english-speaking, [employer]. [facility] . Anxiety reported. Per report from bedside RN, difficulty coping/adjusting to long medical course and long hospitalization. No chemical health concerns.      ADLs/IADLs: Pt is SBA for standing ADL and routine using walker. Pt sits for energy conservation. Pt's endurance is getting better but his right visual deficit does impact independence as he does require cues and assist at times. Pt's fiance will be assisting. Pt will require a suction cup GB and shower chair. Recommend HH OT.    Mobility: Pt making steady gains with endurance, demo sup<>sit Wolfgang, STS FWW Wolfgang, amb ' SBA, and jazzy 5x6\" steps B rails Wolfgang. Rec HH PT. Pt owns FWW for use at home.    Cognition/Language: SLP mild difficulty at more complex level with reasoning/executive function, memory may be nearing baseline, although does benefit from cueing/assist. Pt may " benefit from MAP, further SLP TBD    Community Re-Entry: w/c based for community distances, plan to amb home distances with FWW    Transportation: requires assist from family    Decision maker: self    Plan of Care and goals reviewed and updated.    Discharge Plan/Recommendations    Fall Precautions: continue    Overall plan for the patient:   Ongoing medicine and pulmonary teams assistance greatly appreciated for medical management.  PO intake improved and PEG tube now removed. Continues to make functional progress, particularly in the past week.  Now ambulating 140 ft with a walkre, Min A bed mobility, SBA for transfers.  Needs Min A for stairs, but does not need to perform any for discharge to [facility], and per significant other does not need to navigate stairs at their home either.  SBA for ADLs but still needs cues and supervision due to IV pole and R vision deficits which cause him to bump into objects. Determining if FWW or 4WW will be best upon discharge.  Mild cognitive deficits persist but improving and not a major barrier to discharge.  Given progress over the past week, he is on track for discharge to [facility] on 12/30/21 with  PT, OT, SLP, RN, and HHA.  Will need family training and multiple education classes including PICC/IV antibiotic, anticoagulation, stroke, and DM prior to discharge.  Will also initiate MAP program.        Utilization Review and Continued Stay Justification    Medical Necessity Criteria:    For any criteria that is not met, please document reason and plan for discharge, transfer, or modification of plan of care to address.    Requires intensive rehabilitation program to treat functional deficits?: Yes    Requires 3x per week or greater involvement of rehabilitation physician to oversee rehabilitation program?: Yes    Requires rehabilitation nursing interventions?: Yes    Patient is making functional progress?: Yes    There is a potential for additional functional  progress? Yes    Patient is participating in therapy 3 hours per day a minimum of 5 days per week or 15 hours per week in 7 day period?:Yes    Has discharge needs that require coordinated discharge planning approach?:Yes      Barriers/Concerns related to meeting medical necessity criteria:  None    Team Plan to Address Concern:  N/A      Final Physician Sign off    Statement of Approval: I have reviewed and agree with the recommendations and documentation in this care conference note.       Patient Goals  SW: Confirm discharge recommendations with therapy, coordinate safe discharge plan and remain available to support and assist as needed.         OT Frequency: daily 60-75 min  OT goal: hygiene/grooming: modified independent  OT goal: upper body dressing: Modified independent  OT goal: lower body dressing: Modified independent  OT goal: upper body bathing: Supervision/stand-by assist  OT goal: lower body bathing: Supervision/stand-by assist  OT Goal: transfer: Supervision/stand-by assist (tub transfer)  OT goal: toilet transfer/toileting: Modified independent,toilet transfer,cleaning and garment management  OT goal: meal preparation: Modified independent,with simple meal preparation  OT goal: cognitive: Patient/caregiver will verbalize understanding of cognitive assessment results/recommendations as needed for safe discharge planning  OT goal 1: Pt will be IND with UB HEP to improve BUE function for ADL     PT Frequency: daily 60-90 minutes  PT goal: bed mobility: Supine to/from sit,Independent  PT goal: transfers: Modified independent,Bed to/from chair,Sit to/from stand (WW)  PT goal: gait: Modified independent,Rolling walker,100 feet  PT goal: stairs: Modified independent,Greater than 10 stairs (12 stairs with rail per home setup)  PT goal 1: Car transfer into family vehicle, SBA using WW     SLP Frequency: daily  SLP Swallow Goal:  Safely tolerate diet without signs/symptoms of aspiration: Regular diet,Thin  liquids,With use of swallow precautions goalmet   SLP goal 1: Pt will demonstrate and initiate a plan within a structured task with 90% accuracy and min cues  SLP goal 2: Pt will implement strategies to increase attention to visual and verbal information through strutcured and functional tasks with 90% accuracy and min cues  SLP goal 3: SLP:pt to use memory strategies for new learning/retention (up to 3-4 pieces information )80% accuracy min cues for increased independence with IADLS                                 Patient/Family Goal: Medication Management: Pt will demonstrate ability to manage medications safely before discharge by completing MAP as needed.      Goal: Skin Integrity: Pt will demonstrate understanding of maintaining skin integrity by turning and repositioning frerquently and asking for assistance as needed.     Goal: Carryover of Rehab Techniques: Pt will utilize therapy techniques nikki as independent as possible prior to discharge.    Goal: Safety Management: Pt will make needs known and use call light appropriately to mobilize prior to discharge.

## 2021-12-27 NOTE — PROGRESS NOTES
Lung Transplant Consult Follow Up Note   December 27, 2021            Assessment and Plan:   Edson Thornton is a 56 year old male with a PMH significant for NSIP/ILD, bronchiectasis, moderate PH, RA, SALTY, chronic HSV infection, hypogammaglobulinemia, steroid-induced diabetes, hypothyroidism, PFO, HTN, HLD, duodenal anomaly, anxiety, and depression.  Admitted on 9/5/21 from OSH for acute on chronic respiratory failure 2/2 ILD exacerbation, now s/p BSLT on 10/16/21.  Prolonged vent wean s/p trach (10/29-12/8) and PEG/J tube (10/29).  Post-op course also complicated by encephalopathy and diffuse weakness, acute to subacute CVA, afib with RVR, BRENNAN, GI bleed, Candidemia/Candida empyema, and anxiety.  Code called 11/2 for bradycardia/asystole, progressive hypotension, found to have significant GI bleed, EGD with adherent clot near PEG tube site that was clipped.  Discharged to ARU 12/13 for ongoing rehabilitation. Continued gradual clinical improvement.     Recommendations:   - Tacrolimus level therapeutic at 12.2 on 12/27. Continue current dose and recheck tomorrow (ordered)  - Prednisone taper due 1/2/22  - CMV and EBV due 1/15/22 (not yet ordered)  - Continue fluconazole. Switch from ceftazidime to levofloxacin on discharge  - Recheck IgG with clinic follow up.  - With planned discharge tomorrow, please check CBC, BMP and mag in AM (ordered)  - OK to discontinue nebulized medication.  Please discharge with xopenex inhaler PRN  - DSA increased on 12/27.  Please recheck in 1 week.     S/p BSLT for ILD:  Acute hypoxic respiratory failure, Resolved:   Prolonged vent weaning s/p trach, Resolved:  Bilateral pleural effusions: Explant pathology with NSIP, no malignancy.  Prolonged vent wean s/p trach and PEG/J tube placement with thoracic surgery 10/29.  Code called 11/2 for bradycardia/asystole (required 1 round of CPR, no medications) then progressive hypotension, GI bleed as below.  S/p left chest tube  placement (11/3-11/30) for pleural effusion/empyem s/p lytics 11/25-11/28 (CXR showing trapped left lung), right thoracentesis 11/27 (cultures negative).  Chest CT (11/22) with new LLL cavitary lesion with multifocal GGO in BUL, new 1.8 cm fluid collection with surrounding fat stranding in the left axilla, decreased bilateral loculated pleural effusions, and similar small pericardial effusion.  Hypoxia resolved 12/1, trach decannulated 12/8.  CMV/EBV negative 12/15.  Chest CT 12/20 with overall decreased size and number of bilateral infectious/inflammatory GGO, decreased but persistent LLL cavitary lesion, and decreased bilateral pleural loculated effusions.  Remains on RA, ongoing MYERS, minimal cough/sputum mostly around time of neb treatments.  - Chest x-ray (12/27), reviewed by me, persistent elevated right hemidiaphragm left lower lobe cavity with air-fluid level. Noted on CT of 12/20  - Nebs: levalbuterol and Mucomyst TID. Recommend stopping nebulized medication at discharge and discharge with xopenex MDI to use PRN.   - Encourage frequent use of IS and Aerobika  - Volume management per primary team  - CXR with clinic follow up  - CMP, Mg++, CBC qMTh minimally     Immunosuppression: s/p induction therapy with basiliximab 10/16 (and high dose IV steroid) and 10/20  - Tacrolimus goal level 8-12.  Level 12/27 therapeutic at 12.2. Continue current dose and recheck Thursday (ordered)  - MMF 1000 mg BID (decreased 11/2 given GI bleed, AZA to be avoided given TPMT)  - Prednisone 10 mg BID, next taper due 1/2/22.  Date AM dose (mg) PM dose (mg)   12/5/21 10 10   1/2/22 10 7.5   1/30/22 7.5 7.5   2/27/22 7.5 5   3/27/22 5 5   4/24/22 5 2.5      Prophylaxis:   - Bactrim for PJP ppx (held 11/2-11/5 d/t BRENNAN)  - Nystatin for oral candidiasis ppx, 6 month course  - See below for serologies and viral ppx:    Donor Recipient Intervention   CMV status Negative Negative None, CMV monthly   EBV status Positive Positive None, EBV  monthly   HSV status N/A Positive S/p acyclovir POD #1-30 (recent infection history pre-txp)      Positive cross match:   Positive DSA: Note that he received two doses of rituximab in June, which is likely contributing to cross match result.  DSA newly positive 11/29 with DQB5 (mfi 2522), decreased (mfi 1873) on 12/6.  DSA on 12/12, mfi decreased further to 1703.    - DSA elevated on 12/27 but clinically improving.  Recheck in 1 week.  If continued increase or any clinical decline, will need to consider intervening. For now, maintain optimal immunosuppression.  - Repeat DSA every two weeks      Date DSA mfi Biopsy (date) Treatment   11/1/2021 None      11/15/2021 None      11/29/2021 DQB5 2522     12/6/2021 DQB5 1873     12/12/2021 DQB5 1703     12/27/2021 DQB5 3924              ID: Concern for possible Strongyloides exposure pre-transplant s/p ivermectin x1 dose (9/17).  Donor and recipient cultures NGTD.  S/p IV ceftazidime/vancomycin for 48h per protocol and additional empiric ceftazidime 10/19-10/23 given recurrent fevers as below.  Cryptococcal Ag negative 10/29.  Blood mixed in sputum 12/3, none reported recently.  Coccidiodes blood Ag last negative 12/17.      Klebsiella pneumoniae:  Pseudomonas fluorescens/putida:   LLL cavity: Klebsiella initially noted trach sputum culture 11/10. Started on ceftriaxone 11/11, transitioned to ertapenem 11/12-11/13, and back to ceftriaxone 11/14.  Bronch culture 11/12 with Klebsiella and Pseudomonas fluorescens/putida (R-meropenem).  Chest CT 11/22 with LLL cavity as above, BAL with Klebsiella pneumoniae. Histo Ag 11/22, 11/23 and Blast Ag 11/22 negative. S/p Zosyn (11/15-11/23) and levofloxacin (11/23-12/7).  Transplant ID evaluated 12/21 with recommendation to continue ceftazidime (po levofloxacin at time of discharge) and fluconazole (as below) until 1/18/22.  - ABX: ceftazidime (11/23-1/18) per transplant ID, change to levofloxacin at discharge per transplant ID.  -  Transplant ID follow up scheduled 1/18/22 with repeat chest CT prior      Disseminated Candida: Noted on blood cultures 10/20 and 10/22.  BDG fungitell positive (399) on 10/20.  Respiratory cultures with persistent Candida albicans.  TC 10/23 without evidence of endocarditis.  Ophthalmology consult 10/24 with benign dilated fundoscopic exam.  Candida empyema also noted 10/25, chest tubes inadvertently removed by CVTS 10/28, left chest tube replaced by IR 11/3 as above with ongoing Candida on cultures (11/3, 11/18).  BDG fungitell positive (>500) and Aspergillus galactomannan negative 11/22.  - continue Fluconazole until transplant ID follow up.     HSV: Chronic intermittent active infection pre-transplant with recent HSV infection: crusted lesions throughout left side of jaw, s/p 10 day treatment course of ACV through 10/9.  HSV PCR blood negative 10/17.  S/p ACV ppx as above.     Hypogammaglobulinemia: IgG previously low at 364 (9/7).  Noted at 265 at time of transplant, s/p IVIG 10/21, repeat IgG (11/17) 198, s/p IVIG (with premedication) 11/18.  Repeat IVIG on 12/15 of 365.  IVIG indicated (and ordered) per protocol, but was told ARU is unable to administer IVIG and patient is unable to leave to receive it in an infusion center.  - Repeat IgG 12/30 (ordered)      SALTY: Noted pre-transplant.  Home CPAP 6-12 cm H2O.  - Consider repeat sleep study as OP, revisit need for BiPAP/CPAP if change in clinical course     Hypomagnesemia: Suppressed reabsorption 2/2 CNI.  Mg 1.7 on 12/27.  - Mag-Ox 800 mg BID (increased 12/20)     We appreciate the excellent care provided by the ARU team.  Recommendations communicated via telephone and this note.  Will continue to follow along closely, please do not hesitate to call with any questions or concerns.      Abiodun Woods MD  [phone]            Interval History:   Recovering well  Dyspnea at rest: None  Dyspnea on exertion: Gradual improvement  Cough:intermittent, mostly with  nebs  Sputum:  Small amt of clear. No chest congestion  Hemoptysis: none   Chest Pain: mild incisional           Review of Systems:   C: NEGATIVE for fever, chills  INTEGUMENTARY/SKIN: no rash or obvious new lesions  ENT/MOUTH: no sore throat, new sinus pain or nasal drainage  RESP: see interval history  CV: NEGATIVE for chest pain, palpitations or peripheral edema  GI: no nausea, vomiting, change in stools  : no dysuria  MUSCULOSKELETAL: hand stiffness            Medications:       sodium chloride         acetylcysteine  2 mL Nebulization TID     amLODIPine  5 mg Oral Daily     calcium carbonate 600 mg-vitamin D 400 units  1 tablet Oral or Feeding Tube BID w/meals     cefTAZidime  2 g Intravenous Q8H     escitalopram  20 mg Oral or Feeding Tube Daily     fluconazole  400 mg Intravenous Q24H     fluticasone  1 spray Both Nostrils Daily     folic acid-vit B6-vit B12  1 tablet Oral Daily     furosemide  40 mg Oral Daily     heparin lock flush  5-20 mL Intracatheter Q24H     insulin aspart  1-7 Units Subcutaneous TID AC     insulin glargine  9 Units Subcutaneous QAM     levalbuterol  1.25 mg Nebulization TID     levothyroxine  25 mcg Oral Daily     magnesium oxide  800 mg Oral BID     multivitamin w/minerals  1 tablet Oral Daily     mycophenolate  1,000 mg Oral BID     nystatin  1,000,000 Units Swish & Swallow 4x Daily     pantoprazole  40 mg Oral BID AC     [START ON 1/2/2022] predniSONE  10 mg Oral Daily     predniSONE  10 mg Oral or Feeding Tube BID     [START ON 1/2/2022] predniSONE  7.5 mg Oral QPM     rosuvastatin  10 mg Oral or Feeding Tube Daily     sodium chloride (PF)  10-40 mL Intracatheter Q8H     sulfamethoxazole-trimethoprim  1 tablet Oral or Feeding Tube Daily     tacrolimus  2 mg Oral BID IS     warfarin ANTICOAGULANT  1.5 mg Oral ONCE at 18:00     acetaminophen, albuterol, bisacodyl, dextrose, glucose **OR** dextrose **OR** glucagon, heparin lock flush, hydrOXYzine, lidocaine (viscous), magnesium  hydroxide, melatonin, naloxone **OR** naloxone **OR** naloxone **OR** naloxone, ondansetron, - MEDICATION INSTRUCTIONS -, prochlorperazine, QUEtiapine, senna-docusate, sodium chloride (PF), sodium chloride (PF), traZODone, Warfarin Therapy Reminder, zinc-white petrolatum         Physical Exam:   Temp:  [97.7  F (36.5  C)] 97.7  F (36.5  C)  Pulse:  [] 89  Resp:  [18-20] 20  BP: (122-133)/(67-83) 122/67  SpO2:  [94 %-98 %] 95 %    Intake/Output Summary (Last 24 hours) at 12/27/2021 1247  Last data filed at 12/27/2021 0842  Gross per 24 hour   Intake 265 ml   Output 1300 ml   Net -1035 ml     Constitutional:   Awake, alert and in no apparent distress     Eyes:   nonicteric     ENT:    oral mucosa moist without lesions     Neck:   Supple without supraclavicular or cervical lymphadenopathy     Lungs:   Good air flow.  Minimal left basilar crackles. No rhonchi.  No wheezes.     Cardiovascular:   Normal S1 and S2.  RRR.  No murmur. No gallop. No rub.     Abdomen:   NABS, soft, nondistended, nontender.  No HSM.     Musculoskeletal:   No edema     Neurologic:   Alert and conversant.     Skin:   Warm, dry.  No rash on limited exam.             Data:   All laboratory and imaging data reviewed.    Results for orders placed or performed during the hospital encounter of 12/13/21 (from the past 24 hour(s))   Glucose by meter   Result Value Ref Range    GLUCOSE BY METER POCT 149 (H) 70 - 99 mg/dL   Glucose by meter   Result Value Ref Range    GLUCOSE BY METER POCT 139 (H) 70 - 99 mg/dL   Glucose by meter   Result Value Ref Range    GLUCOSE BY METER POCT 136 (H) 70 - 99 mg/dL   CBC with Platelets & Differential    Narrative    The following orders were created for panel order CBC with Platelets & Differential.  Procedure                               Abnormality         Status                     ---------                               -----------         ------                     CBC with platelets and d...[[id number]]   Abnormal            Final result                 Please view results for these tests on the individual orders.   INR   Result Value Ref Range    INR 2.71 (H) 0.86 - 1.14   Basic metabolic panel   Result Value Ref Range    Sodium 139 133 - 144 mmol/L    Potassium 4.4 3.4 - 5.3 mmol/L    Chloride 106 94 - 109 mmol/L    Carbon Dioxide (CO2) 26 20 - 32 mmol/L    Anion Gap 7 3 - 14 mmol/L    Urea Nitrogen 24 7 - 30 mg/dL    Creatinine 0.89 0.66 - 1.25 mg/dL    Calcium 9.5 8.5 - 10.1 mg/dL    Glucose 132 (H) 70 - 99 mg/dL    GFR Estimate >90 >60 mL/min/1.73m2   Magnesium   Result Value Ref Range    Magnesium 1.7 1.6 - 2.3 mg/dL   Tacrolimus by Tandem Mass Spectrometry   Result Value Ref Range    Tacrolimus by Tandem Mass Spectrometry 12.2 5.0 - 15.0 ug/L    Tacrolimus Last Dose Date      Tacrolimus Last Dose Time      Narrative    This test was developed and its performance characteristics determined by the [facility],  Special [facility]. It has not been cleared or approved by the FDA. The laboratory is regulated under CLIA as qualified to perform high-complexity testing. This test is used for clinical purposes. It should not be regarded as investigational or for research.   PRA Donor Specific Antibody    Narrative    The following orders were created for panel order PRA Donor Specific Antibody.  Procedure                               Abnormality         Status                     ---------                               -----------         ------                     PRA Donor Specific Antibody[[id number]]                      In process                   Please view results for these tests on the individual orders.   CBC with platelets and differential   Result Value Ref Range    WBC Count 11.3 (H) 4.0 - 11.0 10e3/uL    RBC Count 4.01 (L) 4.40 - 5.90 10e6/uL    Hemoglobin 11.0 (L) 13.3 - 17.7 g/dL    Hematocrit 35.1 (L) 40.0 - 53.0 %    MCV 88 78 - 100 fL    MCH 27.4 26.5 - 33.0 pg     MCHC 31.3 (L) 31.5 - 36.5 g/dL    RDW 13.9 10.0 - 15.0 %    Platelet Count 177 150 - 450 10e3/uL    % Neutrophils 85 %    % Lymphocytes 7 %    % Monocytes 6 %    % Eosinophils 0 %    % Basophils 0 %    % Immature Granulocytes 2 %    NRBCs per 100 WBC 0 <1 /100    Absolute Neutrophils 9.5 (H) 1.6 - 8.3 10e3/uL    Absolute Lymphocytes 0.8 0.8 - 5.3 10e3/uL    Absolute Monocytes 0.7 0.0 - 1.3 10e3/uL    Absolute Eosinophils 0.0 0.0 - 0.7 10e3/uL    Absolute Basophils 0.1 0.0 - 0.2 10e3/uL    Absolute Immature Granulocytes 0.3 <=0.4 10e3/uL    Absolute NRBCs 0.0 10e3/uL   Glucose by meter   Result Value Ref Range    GLUCOSE BY METER POCT 119 (H) 70 - 99 mg/dL   Glucose by meter   Result Value Ref Range    GLUCOSE BY METER POCT 162 (H) 70 - 99 mg/dL

## 2021-12-27 NOTE — PLAN OF CARE
FOCUS/GOAL  Bowel management, Bladder management, Pain management, Mobility, Skin integrity, and Safety management    ASSESSMENT, INTERVENTIONS AND CONTINUING PLAN FOR GOAL:  A/O x4, VSS on RA.  Up CG w/ gait belt and walker to the  bathroom.  Continent of bowel and bladder, last BM today.  Regular thin diet, takes his pills well whole.  Bgs were 149 and .  No c/o pain, no prn meds given.  Left triple lumen PICC dressing changed this shift. WNL and heparin locked.  Cont w/ POC.

## 2021-12-27 NOTE — PLAN OF CARE
FOCUS/GOAL  Medication management, Medical management, and Reinforcement of self-care/ADL    ASSESSMENT, INTERVENTIONS AND CONTINUING PLAN FOR GOAL:  Patient is alert/oriented x4, has been able to call appropriately for care needs on this shift.  Patient denies any pain, is able to ambulate with CGAo1 and walker.  Patient's BG this am was 119 and 162 before lunch. Formal care rounds held for patient today, discussed educations needs and starting on MAP.  Med list updated for patient and explanation of expectation given, continue to reinforce.  Patient has PICC triple lumen, all lines flushing and heparin locked.  Old trach and old peg sites healing, encourage adequate PO intake, patient has poor appetite.  No additional care concerns at this time, continue with POC.

## 2021-12-27 NOTE — PROGRESS NOTES
CLINICAL NUTRITION SERVICES - REASSESSMENT NOTE     Nutrition Prescription    RECOMMENDATIONS FOR MDs/PROVIDERS TO ORDER:  None today - pt reviewed face to face during team rounds     Malnutrition Status:    Severe vs non-severe malnutrition in the context of acute on chronic illness or injury     Recommendations already ordered by Registered Dietitian (RD):  RD will update supplement order to Kim Nepro once daily at breakfast up to TID at all meals PRN (pt can request extra servings at any time)    Future/Additional Recommendations:  Continue to monitor meal intakes/supplement acceptance, weight and lab trends      EVALUATION OF THE PROGRESS TOWARD GOALS   Diet: Regular  Supplement: Kim Nepro once daily at breakfast meal   Nutrition Support: None, G/J tube removed on 12/24  Intake: % per flow sheets, previous days calorie counts from 12/20-12/22 indicated pt averaged 95% minimum energy needs and 91% minimum protein needs     NEW FINDINGS   MAR reviewed. Labs reviewed. Pt reviewed during team rounds and visited at bedside, noted feeding tube removed and pt continues to have improved po intakes, reviewed supplement orders and will adjust to above to allow pt to order more PRN, also reviewed supplement options for once pt is discharged, encouraged pt to continue to increase po intakes for continued recovery.     12/24/21 0622 66 kg (145 lb 9.6 oz)   12/17/21 0656 68.3 kg (150 lb 9.6 oz)   12/13/21 1551 67.9 kg (149 lb 12.8 oz)     12/12/21 0600 67 kg (147 lb 11.2 oz)   12/05/21 0500 66 kg (145 lb 8.1 oz)   11/12/21 0000 66.8 kg (147 lb 4.3 oz)   09/12/21 0500 68.6 kg (151 lb 3.8 oz)   09/05/21 1752 73.3 kg (161 lb 8 oz)        75.6 kg (166 lb 10.7 oz) 09/05/2021 - per CE     Weight assessment: Significant 3% weight loss in 1 week vs weighing accuracy, non-significant 1.3% weight loss in 1 month, non-significant 3.9% weight loss in 3 months.     MALNUTRITION  % Intake: No decreased intake noted  % Weight  Loss: > 2% in 1 week (severe) vs accuracy   Subcutaneous Fat Loss: Facial region: mild   Muscle Loss: Temporal: mild   Fluid Accumulation/Edema: None noted  Malnutrition Diagnosis: Severe vs non-severe malnutrition in the context of acute on chronic illness or injury     Previous Goals   Patient to consume % of nutritionally adequate meal trays TID, or the equivalent with supplements/snacks.  Evaluation: Met    Previous Nutrition Diagnosis  Inadequate oral intake vs increased nutrient needs related to decreased appetite/post transplant status as evidenced by continued need for enteral nutrition support and therapeutic recommendations   Evaluation: Improving    CURRENT NUTRITION DIAGNOSIS  Predicted inadequate oral intake vs increased nutrient needs related to decreased but improving appetite/post transplant status as evidenced by therapeutic recommendations     INTERVENTIONS  Implementation  Medical food supplement therapy - orders adjusted as above     Goals  Patient to consume % of nutritionally adequate meal trays TID, or the equivalent with supplements/snacks.    Monitoring/Evaluation  Progress toward goals will be monitored and evaluated per protocol.    Zenaida Washington RD, CNSC, LD  [facility]AH[facility] pager: [phone]

## 2021-12-28 ENCOUNTER — APPOINTMENT (OUTPATIENT)
Dept: OCCUPATIONAL THERAPY | Facility: CLINIC | Age: 56
End: 2021-12-28
Attending: PHYSICAL MEDICINE & REHABILITATION
Payer: COMMERCIAL

## 2021-12-28 ENCOUNTER — APPOINTMENT (OUTPATIENT)
Dept: SPEECH THERAPY | Facility: CLINIC | Age: 56
End: 2021-12-28
Attending: PHYSICAL MEDICINE & REHABILITATION
Payer: COMMERCIAL

## 2021-12-28 ENCOUNTER — APPOINTMENT (OUTPATIENT)
Dept: PHYSICAL THERAPY | Facility: CLINIC | Age: 56
End: 2021-12-28
Attending: PHYSICAL MEDICINE & REHABILITATION
Payer: COMMERCIAL

## 2021-12-28 ENCOUNTER — TELEPHONE (OUTPATIENT)
Dept: TRANSPLANT | Facility: CLINIC | Age: 56
End: 2021-12-28
Payer: COMMERCIAL

## 2021-12-28 DIAGNOSIS — Z79.899 ENCOUNTER FOR LONG-TERM (CURRENT) USE OF HIGH-RISK MEDICATION: ICD-10-CM

## 2021-12-28 DIAGNOSIS — Z94.2 LUNG REPLACED BY TRANSPLANT (H): Primary | ICD-10-CM

## 2021-12-28 LAB
DONOR IDENTIFICATION: NORMAL
DQB5: 3924
DSA COMMENTS: NORMAL
DSA PRESENT: YES
DSA TEST METHOD: NORMAL
GLUCOSE BLDC GLUCOMTR-MCNC: 110 MG/DL (ref 70–99)
GLUCOSE BLDC GLUCOMTR-MCNC: 118 MG/DL (ref 70–99)
GLUCOSE BLDC GLUCOMTR-MCNC: 136 MG/DL (ref 70–99)
GLUCOSE BLDC GLUCOMTR-MCNC: 144 MG/DL (ref 70–99)
GLUCOSE BLDC GLUCOMTR-MCNC: 91 MG/DL (ref 70–99)
HOLD SPECIMEN: NORMAL
HOLD SPECIMEN: NORMAL
INR PPP: 2.82 (ref 0.86–1.14)
ORGAN: NORMAL
SA 1 CELL: NORMAL
SA 1 TEST METHOD: NORMAL
SA 2 CELL: NORMAL
SA 2 TEST METHOD: NORMAL
SA1 HI RISK ABY: NORMAL
SA1 MOD RISK ABY: NORMAL
SA2 HI RISK ABY: NORMAL
SA2 MOD RISK ABY: NORMAL
UNACCEPTABLE ANTIGENS: NORMAL
UNOS CPRA: 26
ZZZSA 1  COMMENTS: NORMAL
ZZZSA 2 COMMENTS: NORMAL

## 2021-12-28 PROCEDURE — 36592 COLLECT BLOOD FROM PICC: CPT | Performed by: PHYSICAL MEDICINE & REHABILITATION

## 2021-12-28 PROCEDURE — 85610 PROTHROMBIN TIME: CPT | Performed by: INTERNAL MEDICINE

## 2021-12-28 PROCEDURE — 250N000012 HC RX MED GY IP 250 OP 636 PS 637: Performed by: INTERNAL MEDICINE

## 2021-12-28 PROCEDURE — 999N000157 HC STATISTIC RCP TIME EA 10 MIN

## 2021-12-28 PROCEDURE — 250N000011 HC RX IP 250 OP 636: Performed by: PHYSICIAN ASSISTANT

## 2021-12-28 PROCEDURE — 97110 THERAPEUTIC EXERCISES: CPT | Mod: GO | Performed by: STUDENT IN AN ORGANIZED HEALTH CARE EDUCATION/TRAINING PROGRAM

## 2021-12-28 PROCEDURE — 97129 THER IVNTJ 1ST 15 MIN: CPT | Mod: GN

## 2021-12-28 PROCEDURE — 250N000012 HC RX MED GY IP 250 OP 636 PS 637: Performed by: PHYSICIAN ASSISTANT

## 2021-12-28 PROCEDURE — 250N000013 HC RX MED GY IP 250 OP 250 PS 637: Performed by: INTERNAL MEDICINE

## 2021-12-28 PROCEDURE — 94640 AIRWAY INHALATION TREATMENT: CPT | Mod: 76

## 2021-12-28 PROCEDURE — 94640 AIRWAY INHALATION TREATMENT: CPT

## 2021-12-28 PROCEDURE — 97110 THERAPEUTIC EXERCISES: CPT | Mod: GP

## 2021-12-28 PROCEDURE — 250N000009 HC RX 250: Performed by: PHYSICAL MEDICINE & REHABILITATION

## 2021-12-28 PROCEDURE — 97130 THER IVNTJ EA ADDL 15 MIN: CPT | Mod: GN

## 2021-12-28 PROCEDURE — 250N000011 HC RX IP 250 OP 636: Performed by: PHYSICAL MEDICINE & REHABILITATION

## 2021-12-28 PROCEDURE — 99232 SBSQ HOSP IP/OBS MODERATE 35: CPT | Performed by: INTERNAL MEDICINE

## 2021-12-28 PROCEDURE — 250N000013 HC RX MED GY IP 250 OP 250 PS 637: Performed by: PHYSICAL MEDICINE & REHABILITATION

## 2021-12-28 PROCEDURE — 97530 THERAPEUTIC ACTIVITIES: CPT | Mod: GO

## 2021-12-28 PROCEDURE — 250N000009 HC RX 250: Performed by: INTERNAL MEDICINE

## 2021-12-28 PROCEDURE — 128N000003 HC R&B REHAB

## 2021-12-28 PROCEDURE — 99233 SBSQ HOSP IP/OBS HIGH 50: CPT | Mod: 24 | Performed by: PHYSICIAN ASSISTANT

## 2021-12-28 PROCEDURE — 250N000013 HC RX MED GY IP 250 OP 250 PS 637: Performed by: PHYSICIAN ASSISTANT

## 2021-12-28 RX ORDER — LEVOFLOXACIN 500 MG/1
500 TABLET, FILM COATED ORAL DAILY
Status: DISCONTINUED | OUTPATIENT
Start: 2021-12-30 | End: 2021-12-29

## 2021-12-28 RX ADMIN — TACROLIMUS 2 MG: 1 CAPSULE ORAL at 09:10

## 2021-12-28 RX ADMIN — TACROLIMUS 2 MG: 1 CAPSULE ORAL at 18:16

## 2021-12-28 RX ADMIN — ESCITALOPRAM OXALATE 20 MG: 20 TABLET ORAL at 09:10

## 2021-12-28 RX ADMIN — Medication 1.5 MG: at 18:16

## 2021-12-28 RX ADMIN — PREDNISONE 10 MG: 10 TABLET ORAL at 20:07

## 2021-12-28 RX ADMIN — FLUTICASONE PROPIONATE 1 SPRAY: 50 SPRAY, METERED NASAL at 09:12

## 2021-12-28 RX ADMIN — Medication 25 MG: at 23:40

## 2021-12-28 RX ADMIN — CALCIUM CARBONATE 600 MG (1,500 MG)-VITAMIN D3 400 UNIT TABLET 1 TABLET: at 09:08

## 2021-12-28 RX ADMIN — SULFAMETHOXAZOLE AND TRIMETHOPRIM 1 TABLET: 400; 80 TABLET ORAL at 09:10

## 2021-12-28 RX ADMIN — FUROSEMIDE 40 MG: 40 TABLET ORAL at 09:09

## 2021-12-28 RX ADMIN — LEVALBUTEROL HYDROCHLORIDE 1.25 MG: 1.25 SOLUTION RESPIRATORY (INHALATION) at 20:10

## 2021-12-28 RX ADMIN — Medication 800 MG: at 16:38

## 2021-12-28 RX ADMIN — MYCOPHENOLATE MOFETIL 1000 MG: 250 CAPSULE ORAL at 09:09

## 2021-12-28 RX ADMIN — NYSTATIN 1000000 UNITS: 100000 SUSPENSION ORAL at 09:12

## 2021-12-28 RX ADMIN — CALCIUM CARBONATE 600 MG (1,500 MG)-VITAMIN D3 400 UNIT TABLET 1 TABLET: at 18:16

## 2021-12-28 RX ADMIN — PANTOPRAZOLE SODIUM 40 MG: 40 TABLET, DELAYED RELEASE ORAL at 16:39

## 2021-12-28 RX ADMIN — CEFTAZIDIME 2 G: 2 INJECTION, POWDER, FOR SOLUTION INTRAVENOUS at 12:13

## 2021-12-28 RX ADMIN — MULTIPLE VITAMINS W/ MINERALS TAB 1 TABLET: TAB at 09:08

## 2021-12-28 RX ADMIN — ACETYLCYSTEINE 2 ML: 200 SOLUTION ORAL; RESPIRATORY (INHALATION) at 20:10

## 2021-12-28 RX ADMIN — Medication 800 MG: at 09:09

## 2021-12-28 RX ADMIN — NYSTATIN 1000000 UNITS: 100000 SUSPENSION ORAL at 20:07

## 2021-12-28 RX ADMIN — Medication 1 TABLET: at 09:11

## 2021-12-28 RX ADMIN — Medication 5 ML: at 07:22

## 2021-12-28 RX ADMIN — ACETYLCYSTEINE 2 ML: 200 SOLUTION ORAL; RESPIRATORY (INHALATION) at 12:44

## 2021-12-28 RX ADMIN — LEVALBUTEROL HYDROCHLORIDE 1.25 MG: 1.25 SOLUTION RESPIRATORY (INHALATION) at 12:43

## 2021-12-28 RX ADMIN — NYSTATIN 1000000 UNITS: 100000 SUSPENSION ORAL at 12:11

## 2021-12-28 RX ADMIN — Medication 15 ML: at 12:14

## 2021-12-28 RX ADMIN — NYSTATIN 1000000 UNITS: 100000 SUSPENSION ORAL at 16:39

## 2021-12-28 RX ADMIN — LEVALBUTEROL HYDROCHLORIDE 1.25 MG: 1.25 SOLUTION RESPIRATORY (INHALATION) at 07:48

## 2021-12-28 RX ADMIN — FLUCONAZOLE, SODIUM CHLORIDE 400 MG: 2 INJECTION INTRAVENOUS at 09:23

## 2021-12-28 RX ADMIN — CEFTAZIDIME 2 G: 2 INJECTION, POWDER, FOR SOLUTION INTRAVENOUS at 18:18

## 2021-12-28 RX ADMIN — ROSUVASTATIN CALCIUM 10 MG: 10 TABLET, FILM COATED ORAL at 09:11

## 2021-12-28 RX ADMIN — CEFTAZIDIME 2 G: 2 INJECTION, POWDER, FOR SOLUTION INTRAVENOUS at 03:05

## 2021-12-28 RX ADMIN — PANTOPRAZOLE SODIUM 40 MG: 40 TABLET, DELAYED RELEASE ORAL at 09:11

## 2021-12-28 RX ADMIN — Medication 5 ML: at 19:08

## 2021-12-28 RX ADMIN — INSULIN GLARGINE 9 UNITS: 100 INJECTION, SOLUTION SUBCUTANEOUS at 09:13

## 2021-12-28 RX ADMIN — LEVOTHYROXINE SODIUM 25 MCG: 25 TABLET ORAL at 09:11

## 2021-12-28 RX ADMIN — PREDNISONE 10 MG: 10 TABLET ORAL at 09:10

## 2021-12-28 RX ADMIN — ACETYLCYSTEINE 2 ML: 200 SOLUTION ORAL; RESPIRATORY (INHALATION) at 07:48

## 2021-12-28 RX ADMIN — AMLODIPINE BESYLATE 5 MG: 5 TABLET ORAL at 09:10

## 2021-12-28 RX ADMIN — MYCOPHENOLATE MOFETIL 1000 MG: 250 CAPSULE ORAL at 20:07

## 2021-12-28 ASSESSMENT — ACTIVITIES OF DAILY LIVING (ADL)
ADLS_ACUITY_SCORE: 24
ADLS_ACUITY_SCORE: 23
ADLS_ACUITY_SCORE: 25
ADLS_ACUITY_SCORE: 24
ADLS_ACUITY_SCORE: 24
ADLS_ACUITY_SCORE: 25
ADLS_ACUITY_SCORE: 23
ADLS_ACUITY_SCORE: 25
ADLS_ACUITY_SCORE: 23
ADLS_ACUITY_SCORE: 24
ADLS_ACUITY_SCORE: 23
ADLS_ACUITY_SCORE: 25
ADLS_ACUITY_SCORE: 24
ADLS_ACUITY_SCORE: 25

## 2021-12-28 ASSESSMENT — MIFFLIN-ST. JEOR: SCORE: 1420.49

## 2021-12-28 NOTE — PROGRESS NOTES
[facility]   Acute Rehabilitation Unit  Daily progress note    INTERVAL HISTORY  Seen sitting up in bed, says he is doing well.  Breathing without issue, therapy going well.  Planning for discharge 12/30, will complete IV infusion class and anticoagulation class prior to discharge.  Will start Med Admin Program with nursing. Therapy notes progress ongoing     SLP: worked on moderate complex task for planning, reasoning  min cueing needed for accuracy, correcting errors.Also went through medication list, reviewed procedure for MAP, pt knew indication for most.      OT: Pt continues to make improvements in endurance and ADLs safety and indep.Seated  Rest breaks continue to be very beneficial in order to tolerate increaseed activity. Continued to reinforce R side scanning to avoid bumping into obstacles, continues to require cues for awareness. Plan for IND day tomorrow AM and family tranining in afternoon.    PT: making good gains in PT - on track for discharge to [facility] with support from GIN Ruvalcaba. Patient will likely not reach MOD I by the time of discharge d/t IVs and vision deficits.     MEDICATIONS  Scheduled meds    - Medication Assessment Program - Rehab Services   Does not apply See Admin Instructions     acetylcysteine  2 mL Nebulization TID     amLODIPine  5 mg Oral Daily     calcium carbonate 600 mg-vitamin D 400 units  1 tablet Oral or Feeding Tube BID w/meals     cefTAZidime  2 g Intravenous Q8H     escitalopram  20 mg Oral or Feeding Tube Daily     fluconazole  400 mg Intravenous Q24H     fluticasone  1 spray Both Nostrils Daily     folic acid-vit B6-vit B12  1 tablet Oral Daily     furosemide  40 mg Oral Daily     heparin lock flush  5-20 mL Intracatheter Q24H     insulin aspart  1-7 Units Subcutaneous TID AC     insulin glargine  9 Units Subcutaneous QAM     levalbuterol  1.25 mg Nebulization TID     levothyroxine  25 mcg Oral Daily     magnesium oxide  800  "mg Oral BID     multivitamin w/minerals  1 tablet Oral Daily     mycophenolate  1,000 mg Oral BID     nystatin  1,000,000 Units Swish & Swallow 4x Daily     pantoprazole  40 mg Oral BID AC     [START ON 1/2/2022] predniSONE  10 mg Oral Daily     predniSONE  10 mg Oral or Feeding Tube BID     [START ON 1/2/2022] predniSONE  7.5 mg Oral QPM     rosuvastatin  10 mg Oral or Feeding Tube Daily     sodium chloride (PF)  10-40 mL Intracatheter Q8H     sulfamethoxazole-trimethoprim  1 tablet Oral or Feeding Tube Daily     tacrolimus  2 mg Oral BID IS       PRN meds:  acetaminophen, albuterol, bisacodyl, dextrose, glucose **OR** dextrose **OR** glucagon, heparin lock flush, hydrOXYzine, lidocaine (viscous), magnesium hydroxide, melatonin, naloxone **OR** naloxone **OR** naloxone **OR** naloxone, ondansetron, - MEDICATION INSTRUCTIONS -, prochlorperazine, QUEtiapine, senna-docusate, sodium chloride (PF), sodium chloride (PF), traZODone, Warfarin Therapy Reminder, zinc-white petrolatum      PHYSICAL EXAM  /78 (BP Location: Right arm)   Pulse 99   Temp 97.6  F (36.4  C) (Oral)   Resp 16   Ht 1.646 m (5' 4.8\")   Wt 66 kg (145 lb 9.6 oz)   SpO2 94%   BMI 24.38 kg/m       Gen: Awake, coooperative NAD, participating in OT  HEENT: Trach site now healed, mmm  Cardio: RRR, S1+S2, no m/r/g  Pulm: Non-labored breathing at rest, with lungs clear diminished at bases.  Abd: Soft, non-tender to palpation, bowel sounds present. PEG tube now removed dry scab in place  Ext: No edema in lower or upper extremities b/l, no calf tenderness  Neuro/MSK: alert, oriented, answers questions appropriately, follows commands. tremor      LABS  Recent Labs   Lab 12/28/21  0822 12/28/21  0727 12/28/21  0231 12/27/21  2138 12/27/21  0828 12/27/21  0641 12/26/21  0616 12/26/21  0610 12/23/21  0815 12/23/21  0714   WBC  --   --   --   --   --  11.3*  --   --   --  8.8   HGB  --   --   --   --   --  11.0*  --   --   --  10.3*   MCV  --   --   --   " --   --  88  --   --   --  89   PLT  --   --   --   --   --  177  --   --   --  187   INR  --  2.82*  --   --   --  2.71*  --  2.61*   < > 2.43*   NA  --   --   --   --   --  139  --   --   --  141   POTASSIUM  --   --   --   --   --  4.4  --   --   --  4.5   CHLORIDE  --   --   --   --   --  106  --   --   --  107   CO2  --   --   --   --   --  26  --   --   --  26   BUN  --   --   --   --   --  24  --   --   --  29   CR  --   --   --   --   --  0.89  --   --   --  0.89   ANIONGAP  --   --   --   --   --  7  --   --   --  8   ERIK  --   --   --   --   --  9.5  --   --   --  9.2   *  --  136* 118*   < > 132*   < >  --    < > 130*   ALBUMIN  --   --   --   --   --   --   --   --   --  2.8*   PROTTOTAL  --   --   --   --   --   --   --   --   --  6.0*   BILITOTAL  --   --   --   --   --   --   --   --   --  0.2   ALKPHOS  --   --   --   --   --   --   --   --   --  91   ALT  --   --   --   --   --   --   --   --   --  31   AST  --   --   --   --   --   --   --   --   --  22    < > = values in this interval not displayed.     Recent Results (from the past 24 hour(s))   XR Chest 2 Views    Narrative    EXAM: XR CHEST 2 VW  12/27/2021 12:30 PM     HISTORY:  Routine, h/o lung transplant, known LLL lesion       COMPARISON:  Chest CT 12/20/2021. Chest radiograph 12/20/2021,  12/16/2021    TECHNIQUE: 2 views of the chest    FINDINGS:   Seated PA and lateral views of the chest. Postsurgical changes of  bilateral lung transplantation including clamshell sternotomy wires  and mediastinal surgical clips. Left upper extremity PICC tip  terminates in the high SVC. Endoscopy clips overlying the upper  abdomen.    Trachea is midline. Heart size is normal. Stable blunting of  costophrenic angles bilaterally. Slight decrease in size of loculated  left-sided pleural effusion best appreciated lateral view. Right-sided  pleural effusion is not significantly changed. Redemonstration of left  lower lobe cavitary lesion with possible  small air-fluid level. No  acute osseous abnormalities.      Impression    IMPRESSION:   1. Postsurgical changes of bilateral lung transplantation.  2. Slight improvement of loculated left-sided pleural effusion.  Right-sided pleural effusion not significantly changed.  3. Redemonstration of left lower lobe cavitary lesion with new small  air-fluid level.     I have personally reviewed the examination and initial interpretation  and I agree with the findings.    ANUPAM GASTON,          SYSTEM ID:  [id number]       ASSESSMENT AND PLAN    Edson Thornton is a 56 year old right hand dominant male with a hx of NSIP/ILD associated with rheumatoid lung disease, hypertension, hyperlipidemia, RA who initially presented 9/5/2021 with acute on chronic respiratory failure for transplant work-up s/p bilateral lung transplant (10/16/21) with prolonged hospitalization associated with multifocal bilateral acute/subacute ischemic infarcts of bilateral frontal, occipital lobes.  Postoperative course complicated by prolonged ventilator wean s/p trach and PEG placement (10/29), encephalopathy, new Afib with RVR, BRENNAN, candidemia/Candida empyema, and brief bradycardic arrest with subsequent hypotension in the setting of GI bleed.  [facility] and was decannulated 12/8/2021.  He is currently stable on room air at admission to acute rehab.  He presents with right upper extremity weakness, bilateral dysmetria/tremors, acute on chronic severe deconditioning, dyspnea on exertion, and impairment of ADLs and gait.     Admission to acute inpatient rehab 12/13/2021  .    Impairment group code: Stroke Ischemic 01.3 Bilateral Involvement: embolic CVA affecting B cerebral hemispheres and L cerebellum, as well as s/p bilateral lung transplantation     1. PT, OT, SLP for 60 minutes of each on a daily basis, in addition to rehab nursing and close management of physiatrist.   2. Impairment of ADLs/IADLs:  OT  for 60 min daily to work on upper and lower body self care, dressing, toileting, bathing, energy conservation techniques with use of ADs as needed.   3. Impairment of mobility:   PT for 60 min daily to work on gait exercises, strengthening, endurance buildup, transfers with use of walker as needed.   4. Impairment of cognition:  SLP for 60 minutes for cognitive evaluation and treatment strategies for higher level cognitive deficits, and memory impairment.  5. Rehab RN to administer medication, patient education on medication taking, VS monitoring, bowel regimen, glucose monitoring and wound care/surgical wound dressing changes and monitoring.   6. Medical Conditions - Hospitalist team consulted, appreciate assistance     Neuro/Psych  #Multifocal acute to subacute ischemic stroke, etiology likely embolic vs perioperative  Initially noted 10/22 and 11/6 with stroke code called for change in exam.  MRI brain 10/23 with infarcts noted in both cerebral hemispheres (R frontal, L corona radiata, bilateral occipital), left cerebellum, presumed associated with new Afib vs perioperative. Bilateral upper extremity paresis (R>L), suspicion for some cortical blindness  - Stroke risk factor management:              -Warfarin anticoagulation for Afib, appreciate pharmacy assistance with dosing              -BP goals: SBP<140              -continue Rosuvastatin 10 mg qday              -Not smoking              -Hgb A1c: 6.6%        #Encephalopathy  #Sleep  -Delirium precautions  -Now completed Seroquel weaning (12/22)  -Melatonin 10mg qhs  -Trazodone 25mg prn     #Mood  #History of anxiety and depression  - PTA Lexapro increased to 10 mg while in acute hospital, increased to 20mg 12/14, not using will discontinue hydoxyzine         Pulmonary  #ILD, NISP, s/p bilateral lung transplant (10/16/2021)  #Acute on chronic respiratory failure s/p tracheostomy (10/29/21) and decannulation (12/8/21)  #Bilateral Pleural Effusions  -Sternal  precautions until 12 wks post op (1/8/2022)  -Immunosuppression:              -Continue tacrolimus  goal 8-12              -Continue MMF 1000mg BID              -Continue prednisone - Taper plan per pulmonary.  Currently on 10 mg 10 mg BID (next taper 1/2/22)  -Monitoring lab/imaging              -DSA q2wks              -Coccidioides Ag per transplant completed 12/17 - Per ID, no further need to check monthly              -CMV, EBV per transplant team              -CXR, CBC, BMP, Tac level M/Th  -Continue Levalbuterol TID, mucomyst- call out of transplant ID ? Ongoing need/ recs for neb they plan to see patient today and will clarify discharge neb recs.   -Pulmonary toilet, encourage IS  -Continue Lasix 40mg qday     #SALTY  -Trach site now healed.  IM discussed with pulmonology, no indication for CPAP or BiPAP at this time. Consider outpatient sleep study if indicated.    ID  #Immunosuppression Prophylaxsis   -Bactrim x6 months  -Nystatin x6 months     #L lung cavitary lesion  #Hospital-acquired pneumonia with Klebsiella pneumoniae, Pseudomonas fluorescens/putida  Suspected aspiration versus pseudomonal vs Klebsiella pneumonia related to abscess.  CT  11/22 showing new cavitary lesion in left lung base, multifocal bilateral upper lobe groundglass opacity, and new 1.8 cm fluid collection with surrounding fat stranding in left axilla, decreased bilateral loculated pleural effusions.  QuantiFERON gold indeterminate, negative tuberculin skin test on multiple occurrences.  Underwent BAL on 11/22 with cultures growing Klebsiella and Pseudomonas fluorescens/putida, resistant to meropenem.  ID was consulted and feels this is likely fungal.  B-D glucan positive with known candidemia.  Cocci antigen in urine negative, serum histoplasmosis negative, CRAG negative  -Follow-up chest completed CT 12/20   1. Overall decreased (since 11/22/2021 chest CT) size and number of bilateral infectious/inflammatory groundglass opacities. 2.  Decreased but persistent left lower lobe cavitary lesion.  -Followed up with Dr. Abebe 12/21, recs appreciated   -Continue fluconazole for two additional weeks (until 1/18/2022) to complete 8 weeks of therapy for Genevieve empyema.    -Continue ceftazidime for four additional weeks until 1/18/2022 however, when ready for discharge he may be discharged on PO levaquin 500 mg daily.    -Repeat CT chest without contrast 1/18/2022 before his appointment with Dr. Abebe (1/18/22 at 6:00pm via virtual visit)   -No need to check monthly Coccidioides AG.      #Invasive Candida albicans candidiasis  Positive Candida blood cultures 10/20, 10/22.  Fungitell positive (399).  TC 10/23 without evidence of endocarditis. Ophthalmic consulted 10/24, benign funduscopic exam.  Candida empyema 10/25.  Repeated pleural effusion fungal cultures and fungal/bacterial blood cultures have been without growth since 11/18.  Initially on micafungin (10/22-10/27) before transition to fluconazole IV  -Continue fluconazole for two additional weeks (until 1/18/2022) to complete 8 weeks of therapy for Genevieve empyema per ID follow up recs 12/21     #hx of HSV  Chronic intermittent infection pretransplant, with recent HSV infection while in acute hospital (crusted lesions along left jaw) s/p 10d treatment completed 10/9     Cardiovascular  #Afib  Initially noted 10/18, converted to NSR with amio ggt. Metoprolol and amiodarone discontinued due to bradycardia.  -Warfarin as dosed by pharmacy  -will complete anticoagulation class prior to discharge      #Hypertension  -continue PTA amlodipine 5 mg daily  -also on lasix 40 mg daily      Heme  #Leukocytosis  Previously 13.2 on 12/14, then decreased but today is mildly elevated at 11.3.  No signs of infection at this time.  Monitor for now.      #R subclavian DVT  -Warfarin anticoagulation, pharmacy to dose     #Hypogammaglobulinemia  S/p IVIG  -Repeat IgG 12/15 365  -Repeat ~12/30     Endocrine  #Type 2  diabetes  #Steroid-induced hyperglycemia  -Endocrine consulted while in acute hospital.  Tube feeds on hold starting 12/20 and PEG/J tube now removed.    -  Continue Lantus 9 units daily,   - discontinue sliding scale insulin  - titrate as indicated- anticipate decrease/dc long acting insulin with steroid taper.      #Hypothyroidism  TSH 3.17 on 11/19  -Continue levothyroxine 25 mcg daily     Renal  #BRENNAN, resolved  Cr 0.89 12/27, stable  -Monitor intermittently     MSK/Rheum  #Rheumatoid arthritis  Previously treated with rituximab.  Rheumatology familiar and consulted early in admission.  -Steroids as above     GI/FEN  #Severe malnutrition in the context of acute on chronic illness or injury and evidenced by >7.5% weight loss in the past 3 months -PEG/J tube removed 12/24 at bedside with improved PO intake.  -Continue multivitamin, folate, B6, B12 supplements     #hx of GI Bleed  Patient with progressive hypotension and CODE BLUE for bradycardia/asystole associated with GI bleed in the setting of anticoagulation, requiring massive transfusion protocol on 10/22.  EGD on 11/2 with clotted blood in stomach, adherent clot near PEG site that was clipped without active bleeding.  Most recent EGD 11/5 with ulcer noted near the PEG bumper site, gastritis, suction marks from the G-tube.  GJ tube was exchanged 11/9 by GI. Hgb improved to 11 on 12/27  -Monitor hemoglobin intermittently.  -Continue PPI BID      Bowel/Bladder  Bowel, continent  Constipation  - Senna-docusate 1-2 tablets BID  - Miralax QDAY PRN  - Bisacodyl suppository 10mg QDAY PRN     Bladder, continent     1. Adjustment to disability:  Clinical psychology to eval and treat as indicated  2. FEN: Regular with thin liquids  3. Bowel: continent, meds as above  4. Bladder: continent  5. DVT Prophylaxis: warfarin  6. GI Prophylaxis: PPI  7. Code: full  8. Disposition: Local housing - [facility]  9. ELOS:  Tentative discharge date 12/30/21  10. Rehab prognosis:   good  9. Follow up Appointments on Discharge:                -Neurology 1-2 months after discharge               -Outpatient cardiac/pulmonary Rehab               -Transplant Coordinator               -ID     Susan REYNAC  Department of Rehabilitation Medicine        Time Spent on this Encounter   ISusan spent a total of 35 minutes face-to-face or managing the care of Edson Thornton. Over 50% of my time on the unit was spent counseling the patient and coordinating care. See note for details.

## 2021-12-28 NOTE — PROGRESS NOTES
[facility]    Progress Note - Hospitalist.        Date of Admission:  12/13/2021    Assessment & Plan             Mr. 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    Today's changes: 12/28/2021    No acute events overnight.   No new issues.   NO new changes.        # ILD and NSIP s/p BSLT on 10/16/2021  # Acute on chronic hypoxic respiratory failure s/p tracheostomy on 10/29/21 and decannulation on 12/8/2021  # Bilateral pleural effusions, improved.  - Pulmonary team following the patient.   - No need for BiPAP, he can continue on CPAP at night and follow-up with sleep medicine as outpatient.      Prophylaxis:   - Bactrim for PJP ppx   - Nystatin for oral candidiasis ppx, 6 month course  - Monthly EBV, CMV labs (last sent on 12/15/21, negative for both)     -Immunosuppression: s/p induction therapy with basiliximab 10/16 (and high dose IV steroid) and 10/20 Continue tacrolimus 2 mg BID. Goal level 8-12.  -MMF 1000 mg BID   -Prednisone 10 mg BID, next taper due 1/2/22   - qM/Th CBC/BMP tacrolimus levels, fluconazole continued per ID     Positive cross match:   Positive DSA: Note that he received two doses of rituximab in June, which is likely contributing to cross match result.  DSA newly positive 11/29 with DQB5 (mfi 2522), decreased (mfi 1873) on 12/6. DSA on 12/12, decreased further to 1703.   - Repeat DSA (12/26) every two weeks     Others:   - Levalbuterol and mucomyst TID and pulm toilet and chest PT BID  - PT/OT/SLP -@ PM&R team.   - Continue 40 mg of Lasix  daily  - Oxycodone discontinued   - Acetaminophen PRN 975mg tid     # Left lung cavitary lesion   # Klebsiella pneumoniae and Pseudomonas fluorescens/putida HAP  #Candida empyema  aspiration vs pseudomonal vs Klebsiella pneumonia related abscess. Indeterminate Quant Gold, but negative tuberculin skin tests on mulitple occurences. S/p BAL on 11/22.Cocci antigen in urine negative, serum, BAL histoplasmosis negative, CRAG negative, respiratory viral panel (-), TB (-), candida growth from bal culture. Transplant ID consulted, saw the patient on 12/21.      - Continue IV Ceftazdime 2 grams Q8H (until 1/18/22), switch to levofloxacin 500mg PO after discharge from ARU  - Continue IV fluconazole till 1/18/22  - Follow up CT chest on 1/18/22 (ordered)  - Follow up with Dr. Abebe on 1/18/2022 @6:00 PM in a virtual visit.      # Hypogammaglobinemia:   IgG previously low at 364 (9/7).  Noted at 265 at time of transplant, s/p IVIG 10/21, repeat IgG (11/17) 198, s/p IVIG (with premedication) 11/18. Repeat IVIG on 12/15 of 365;   Per Pulm: IVIG indicated but ARU  unable to administer IVIG and patient is unable to leave to receive it in an infusion center.  - Repeat IgG ~ 12/30  - Further per Transplant team.     # Encephalopathy, resolved  # Difficulty sleeping  Likely multi-factorial in the setting of stroke, prolonged critical illness.  - Ct Seroquel 25 mg TID prn and 50 mg at bedtime. Wean down as tolerated.   - Melatonin 10 mg at bedtime  - Trazodone 25 mg prn.   - Alert and oriented. Encephalopathy resolved.      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 MRI brain on 10/23 with multifocal subacute infarct within both cerebral hemispheres and left cerebellum. Presumed embolic with afib hx, identified while inpatient. He is currently able to ambulate with relative ease.     - Continue warfarin per pharmacy protocol  - INR  therapeutic    #Upper extremity tremors   -Most likely multifactorial related to previous strokes and tacrolimus. Tremors are chronic.   -Follow-up as outpatient.       # Right subclavian DVT   Diagnosed on 11/4 on ultrasound.  - Continue warfarin per pharmacy protocol  - INR therapeutic.      # DM2 with steroid induced hyperglycemia  -BG stable.   -Continue on current insulin regimen.      # Severe malnutrition in the context of acute on chronic illness   - Feeding tube removed.      # Anxiety and depression   Psychiatry reconsulted and after discussion suggest increasing lexapro. Will discuss with patient regarding starting ritalin in the AM for motivation.  -Ct Lexapro 20 mg daily   -As needed hydroxyzine.     # Rheumatoid arthritis- Dx 5/2021 with + CCP antibody and RF. Previously treated with rituximab. Rheum consulted early in admission. On steroids as noted above.      # SALTY   -Continue on CPAP  -Consider sleep study as outpatient     # HTN  - Continue PTA amlodipine.   Stable.      # Hypothyroidism - TSH 3.17 on 11/19/21.   -Continue PTA levothyroxine 25 mcg daily.     # Recurrent GIB - hgb drop on 10/22 s/p 2 unit pRBC transfusion with EGD on 10/23 with NJ/OG tube trauma with scant oozing. Patient then developed progressive hypotension and ultimately CODE BLUE for GIB in setting of anticoagulation with heparin drip, s/p massive transfusion protocol. EGD on 11/2 with large amount of clotted blood in stomach and area of raised mucosa with small adherent clot near PEG tube site that was clipped, no active bleeding.  Maroon stools 11/3, repeat EGD with extensive old blood in stomach, no active bleeding, small nodular area with prior clips clipped again.  Most recent EGD 11/5 with ulcer noted at PEG tube bumper site, gastritis, and suction marks from G tube. TF noted in G tube drainage bag 11/7, AXR with GJ tube tip projecting over proximal duodenum. GJ tube exchanged 11/9 by GI.     -PO PPI BID   -HGB stable.     .   # Hypomagnesemia: Suppressed reabsorption 2/2 CNI.  Requiring intermittent IV and PO replacement.  - Mag-Ox 800 mg qAM / 400 mg qPM (increased 12/8)        Diet: Room Service  Regular Diet Adult Thin Liquids (level 0)  Snacks/Supplements Adult: Nepro Oral Supplement; With Meals    DVT Prophylaxis: Warfarin  Watson Catheter: Not present  Fluids: None  Central Lines: None  Code Status: Full Code      Disposition Plan   Expected Discharge: 12/30/2021     Anticipated discharge location:  Awaiting care coordination huddle   Delays: None         Sadiq Valdez MD  TCU Service  [facility]  Securely message with the Vocera Web Console (learn more here)  Text page via [employer] Paging/Directory    ______________________________________________________________________    Interval History     Interval events reviewed.   States doing better  Denies fever  No cp or sob  No LH or dizziness.   No NV or pain abdomen.   No new sensory or motor complaint.   No new rash.  Stable mood.     No other new or acute medical concern       Data reviewed today: I reviewed all medications, new labs and imaging results over the last 24 hours. I personally reviewed no images or EKG's today.    Physical Exam   Vital Signs: Temp: 97.6  F (36.4  C) Temp src: Oral BP: 126/78 Pulse: 99   Resp: 16 SpO2: 95 % O2 Device: None (Room air)    Weight: 147 lbs 0 oz  General Appearance:  AAOx4, room air, NAD, appears comfortable  Respiratory: Normal respiratory effort, clear lungs.   Cardiovascular: S1, S2, rrr  GI: Soft, non-tender, non distended, + BS.  Neuro: Alert, oriented x 3, moving all extremities, mild upper extremity tremors.     Data   Recent Labs   Lab 12/28/21  1221 12/28/21  0822 12/28/21  0727 12/28/21  0231 12/27/21  0828 12/27/21  0641 12/26/21  0616 12/26/21  0610 12/23/21  0815 12/23/21  0714   WBC  --   --   --   --   --  11.3*  --   --   --  8.8   HGB  --   --   --   --   --  11.0*  --   --    --  10.3*   MCV  --   --   --   --   --  88  --   --   --  89   PLT  --   --   --   --   --  177  --   --   --  187   INR  --   --  2.82*  --   --  2.71*  --  2.61*   < > 2.43*   NA  --   --   --   --   --  139  --   --   --  141   POTASSIUM  --   --   --   --   --  4.4  --   --   --  4.5   CHLORIDE  --   --   --   --   --  106  --   --   --  107   CO2  --   --   --   --   --  26  --   --   --  26   BUN  --   --   --   --   --  24  --   --   --  29   CR  --   --   --   --   --  0.89  --   --   --  0.89   ANIONGAP  --   --   --   --   --  7  --   --   --  8   ERIK  --   --   --   --   --  9.5  --   --   --  9.2   * 118*  --  136*   < > 132*   < >  --    < > 130*   ALBUMIN  --   --   --   --   --   --   --   --   --  2.8*   PROTTOTAL  --   --   --   --   --   --   --   --   --  6.0*   BILITOTAL  --   --   --   --   --   --   --   --   --  0.2   ALKPHOS  --   --   --   --   --   --   --   --   --  91   ALT  --   --   --   --   --   --   --   --   --  31   AST  --   --   --   --   --   --   --   --   --  22    < > = values in this interval not displayed.

## 2021-12-28 NOTE — PLAN OF CARE
FOCUS/GOAL  Medication management, Skin integrity, Carryover of rehab techniques, and Safety management    ASSESSMENT, INTERVENTIONS AND CONTINUING PLAN FOR GOAL:  Pt was A/O, able to use call light and make needs known. Denies pain, SOB, chest pain, N/V, nor dizziness. VSS. CGA walker with transfers. On regular diet, thin liquids, takes medicines. Cont of BL, no BM thi shift, LBM-12/27/21. PICC line in place, blood return noted, dressing CDI. Old trach site, KIM, old PEG tube site dressing CDI. BG monitored. Instructed on MAP. Will continue with current POC.

## 2021-12-28 NOTE — PLAN OF CARE
"Discharge Planner Post-Acute Rehab PT:      Discharge Plan: [facility] with RUDY wheat, then home to [facility]     Precautions: Falls, dyspnea on exertion     Current Status:  Bed Mobility: Min A  Transfer: SBA with WW  Gait:  ft with WW  Stairs: 5 step ups with min A  Balance: Multiple LOB during session and rapid sitting, recommending CGA at all times    30 seconds STS from 20\" mat using hands on FWW 12/19/21 = 6, OMNI 3/10           STS from 20\" mat 12/27 = 8x with hands on  Thighs , OMNI 5/10  TUG 12/19/21, CGA with FWW = 20 seconds, OMNI 7/10           12/27/21 = 15.8 seconds, OMNI 2/10, close SBA with FWW     Assessment: Pt making good gains in PT - on track for discharge to [facility] with support from GIN Ruvalcaba. Patient will likely not reach MOD I by the time of discharge d/t IVs and vision deficits.     Other Barriers to Discharge (DME, Family Training, etc):   Significant deconditioning  4WW  Family training for stairs, car transfer, energy conservation concepts, fall prevention and floor recovery  "

## 2021-12-28 NOTE — PLAN OF CARE
Discharge Planner Post-Acute Rehab SLP:      Discharge Plan: [facility] with fiance , further SLP homecare     Precautions: sternal, fall     Current Status:  Communication: WFL.   Cognition: Mild  cognitive linguistic impairments with deficits re: attention, exeuctive function, memory, and visuospatial skills, word finding.  Swallow: Regular, thin liquid. Medications to be given orally with thin liquids. Will continue to monitor.      Assessment:  Pt with difficulty navigating what to do when faced with problem with download from device, requried cues to determine solution and follow through. Pt participated in higher level language association/reasoning task  via Chain of Thought. Pt required mod initial cues to determine assocations but as task progressed required less support. Reviewed MAP and plan.            Other Barriers to Discharge (Family Training, etc):

## 2021-12-28 NOTE — PLAN OF CARE
"  VS: Blood pressure 126/78, pulse 99, temperature 97.6  F (36.4  C), temperature source Oral, resp. rate 16, height 1.646 m (5' 4.8\"), weight 66.7 kg (147 lb), SpO2 95 %.    Denies chest pain, nausea, dizziness.   O2: Room air, dyspnea on exertion. Lungs clear and equal bilat.   Output: Ambulates to toilet or uses urinal independently. Adequate output.   Last BM: 12/28.   Activity: Assist x1 with 4WW and gait belt.   Skin: Bialt chest incision approximated. PEG tube removal site approximated. Old trach site approximated.   Pain: Denies pain.   CMS: Denies any changes to sensation.   Dressing: Chest and trach sites open to air. PEG tube covered with dry gauze.   Diet: Reg diet, thin liquids, takes pills whole.   LDA: Surgical incisions, triple lumen PICC.   Equipment: 4WW, gait belt, urinal, IV pole.   Plan: Continue with plan of care, on track to discharge 12/30.   Additional Info: Pt alert and oriented x4, able to make needs known with call light in reach at all times. Pt had a a very busy day with therapy, which he believes exacerbated his tremors. Pt's fiancee visited today. MAP started for pt, pill containers in med room in locked box.       "

## 2021-12-28 NOTE — PLAN OF CARE
FOCUS/GOAL  Medical management    ASSESSMENT, INTERVENTIONS AND CONTINUING PLAN FOR GOAL:  Patient is alert and oriented, able to make needs known, used call light if assistance needed. Was awake at the start of the start of the shift, denied pain, chest pain, headache, no N&V, breathing normal. Able to sleep most of the night, awaken in between toilet needs and medication administration, was calm and cooperative when sleep interrupted, Continent of Bladder, used urinal independently, emptied by staff, no BM this shift. Bed alarm ON, 3 side rails UP, safety rounding checked implemented, no concern at this time, may continue with POC.

## 2021-12-28 NOTE — PLAN OF CARE
Discharge Planner Post-Acute Rehab OT:      Discharge Plan: to [facility] with fiance     Precautions: sternal, fall     Current Status:  ADLs:    Mobility: SBA for in room mobility using walker    Grooming: SBA standing at the sink, chair  near by for intermittent rest breaks, Seated only w/ set up to manage fatigue.     Dressing: UB SBA, SBA for LB donning pants seated and standing to pull up, additional effort due to fatigue- pt may benefit from a reacher to decreased trunk bending.     Bathing: Min A w/shower chair    Toileting: SBA for transfer using walker, SBA for [place]-cares to follow precautions  IADLs: fiance will assist  Vision/Cognition: L visual deficit at baseline and new right eye blind spot. Pt scored 24/30 on the SLUMS indicating mild deficit     Assessment: Pt continues to make improvements in endurance and ADLs safety and indep.Seated  Rest breaks continue to be very beneficial in order to tolerate increaseed activity. Continued to reinforce R side scanning to avoid bumping into obstacles, continues to require cues for awareness. Plan for IND day tomorrow AM and family tranining in afternoon.      Other Barriers to Discharge (DME, Family Training, etc): poor activity endurance, but improving.

## 2021-12-28 NOTE — TELEPHONE ENCOUNTER
A pharmacist spent 35 minutes providing medication teaching with Edson Thornton for discharge with a focus on new medications/dose changes.  The discharge medication list was reviewed with the patient/family and the following points were discussed, as applicable: Name, description, purpose, dose/strength, duration of medications, common side effects, food/medications to avoid, action to be taken if dose is missed and how to obtain refills.  The patient will be responsible for managing medications. Additionally, the following transplant related education was covered: Purpose of medication card, Timing of medications and day of lab draw considerations , Emesis or missed dose, Prescription Insurance  and Discharge process for receiving meds   Patient will  transplant supplies including 7 day pill organizer and BP monitor, at discharge pharmacy along with medications.  Patient will use local pharmacy or other mail order for outpatient medications. Local [facility]  Clinical Pharmacy Consult:                                                      Transplant Specific:   Date of Transplant: 10/16/2021  Type of Transplant: lung  First Transplant: yes  History of rejection: no    Immunosuppression Regimen   TAC 2 mg qAM & 2 mg qPM, Prednisone 10 mg qAM & 10 mg qPM and MMF 1,000 mg qAM & 1,000 mg qPM  Patient specific goal: Tac 8 to 12  Most recent level: 12.2., date 12/27  Immunosuppressant Levels:  Therapeutic  Pt adherent to lab draws: yes  Scr:   Lab Results   Component Value Date    CR 0.89 12/27/2021     Side effects: Tremors #Upper extremity tremors   -Most likely multifactorial related to previous strokes and tacrolimus. Tremors are chronic.     Prophylactic Medications  Antibacterial:  Bactrim 400/80 mg daily  Scheduled Discontinue Date: Lifelong    Antifungal: Nystatin four times daily  Scheduled Discontinue Date: 6 months    Antiviral: Acyclovir POD#1-30  Scheduled Discontinue Date: Therapy Complete    Acid  Reducer: Protonix (pantoprazole) 40 mg twice daily  Scheduled Reviewed Date: review in clinic    Thrombosis Prevention: warfarin  Scheduled Discontinue Date: review in clinic    Blood Pressure Management  Frequency of home Blood Pressure checks: twice daily  Most recent home BP: 126/78  Patient Blood pressure goal: <140/90  Patient blood pressure at goal:  yes  Hospitalizations/ER visits since last assessment: 0      Med rec/DUR performed: yes  Med Rec Discrepancies: no    Reminders:    1. Bring to first clinic appt: med box, med card, bp monitor, all medications being taken, and lab book.  2.   MTM pharmacist visit on first clinic appt and if ok, again in 3 to 4 months during follow up appt.  3.   Avoid Grapefruit and Grapefruit juice.   4.   Avoid herbal supplements. If wish to take other medications or supplements, call your coordinator.   5.   Keep lab appts.   6.   Can use apps on phone like [employer] to help manage medication lists and reminders.   7.   Make sure you are protecting your skin by wearing long sleeves and applying sunscreen to exposed skin, for any significant time in the sun.     Transplant Coordinator is Mady Clark, Pharm.D.  [facility]  [phone]

## 2021-12-29 ENCOUNTER — DOCUMENTATION ONLY (OUTPATIENT)
Dept: ANTICOAGULATION | Facility: CLINIC | Age: 56
End: 2021-12-29
Payer: COMMERCIAL

## 2021-12-29 ENCOUNTER — APPOINTMENT (OUTPATIENT)
Dept: PHYSICAL THERAPY | Facility: CLINIC | Age: 56
End: 2021-12-29
Attending: PHYSICAL MEDICINE & REHABILITATION
Payer: COMMERCIAL

## 2021-12-29 ENCOUNTER — APPOINTMENT (OUTPATIENT)
Dept: OCCUPATIONAL THERAPY | Facility: CLINIC | Age: 56
End: 2021-12-29
Attending: PHYSICAL MEDICINE & REHABILITATION
Payer: COMMERCIAL

## 2021-12-29 ENCOUNTER — APPOINTMENT (OUTPATIENT)
Dept: SPEECH THERAPY | Facility: CLINIC | Age: 56
End: 2021-12-29
Attending: PHYSICAL MEDICINE & REHABILITATION
Payer: COMMERCIAL

## 2021-12-29 PROBLEM — I48.91 ATRIAL FIBRILLATION, UNSPECIFIED TYPE (H): Status: ACTIVE | Noted: 2021-12-29

## 2021-12-29 LAB
GLUCOSE BLDC GLUCOMTR-MCNC: 115 MG/DL (ref 70–99)
GLUCOSE BLDC GLUCOMTR-MCNC: 128 MG/DL (ref 70–99)
GLUCOSE BLDC GLUCOMTR-MCNC: 86 MG/DL (ref 70–99)
GLUCOSE BLDC GLUCOMTR-MCNC: 99 MG/DL (ref 70–99)

## 2021-12-29 PROCEDURE — 97129 THER IVNTJ 1ST 15 MIN: CPT | Mod: GN | Performed by: SPEECH-LANGUAGE PATHOLOGIST

## 2021-12-29 PROCEDURE — 128N000003 HC R&B REHAB

## 2021-12-29 PROCEDURE — 250N000013 HC RX MED GY IP 250 OP 250 PS 637: Performed by: PHYSICIAN ASSISTANT

## 2021-12-29 PROCEDURE — 99233 SBSQ HOSP IP/OBS HIGH 50: CPT | Mod: 24 | Performed by: INTERNAL MEDICINE

## 2021-12-29 PROCEDURE — 94640 AIRWAY INHALATION TREATMENT: CPT

## 2021-12-29 PROCEDURE — 97530 THERAPEUTIC ACTIVITIES: CPT | Mod: GP

## 2021-12-29 PROCEDURE — 250N000009 HC RX 250: Performed by: PHYSICAL MEDICINE & REHABILITATION

## 2021-12-29 PROCEDURE — 250N000013 HC RX MED GY IP 250 OP 250 PS 637: Performed by: INTERNAL MEDICINE

## 2021-12-29 PROCEDURE — 97530 THERAPEUTIC ACTIVITIES: CPT | Mod: GO

## 2021-12-29 PROCEDURE — 250N000009 HC RX 250: Performed by: INTERNAL MEDICINE

## 2021-12-29 PROCEDURE — 250N000012 HC RX MED GY IP 250 OP 636 PS 637: Performed by: INTERNAL MEDICINE

## 2021-12-29 PROCEDURE — 250N000011 HC RX IP 250 OP 636: Performed by: PHYSICAL MEDICINE & REHABILITATION

## 2021-12-29 PROCEDURE — 250N000011 HC RX IP 250 OP 636: Performed by: PHYSICIAN ASSISTANT

## 2021-12-29 PROCEDURE — 250N000013 HC RX MED GY IP 250 OP 250 PS 637: Performed by: PHYSICAL MEDICINE & REHABILITATION

## 2021-12-29 PROCEDURE — 99232 SBSQ HOSP IP/OBS MODERATE 35: CPT | Mod: 24 | Performed by: PHYSICAL MEDICINE & REHABILITATION

## 2021-12-29 PROCEDURE — 999N000157 HC STATISTIC RCP TIME EA 10 MIN

## 2021-12-29 PROCEDURE — 99232 SBSQ HOSP IP/OBS MODERATE 35: CPT | Performed by: INTERNAL MEDICINE

## 2021-12-29 PROCEDURE — 250N000012 HC RX MED GY IP 250 OP 636 PS 637: Performed by: PHYSICIAN ASSISTANT

## 2021-12-29 PROCEDURE — 97535 SELF CARE MNGMENT TRAINING: CPT | Mod: GO

## 2021-12-29 PROCEDURE — 97130 THER IVNTJ EA ADDL 15 MIN: CPT | Mod: GN | Performed by: SPEECH-LANGUAGE PATHOLOGIST

## 2021-12-29 RX ORDER — PREDNISONE 5 MG/1
TABLET ORAL
Qty: 150 TABLET | Refills: 0 | Status: SHIPPED | OUTPATIENT
Start: 2021-12-29 | End: 2022-01-13

## 2021-12-29 RX ORDER — TACROLIMUS 1 MG/1
2 CAPSULE ORAL 2 TIMES DAILY
Qty: 150 CAPSULE | Refills: 0 | Status: SHIPPED | OUTPATIENT
Start: 2021-12-29 | End: 2021-12-30

## 2021-12-29 RX ORDER — FUROSEMIDE 40 MG
40 TABLET ORAL DAILY
Qty: 30 TABLET | Refills: 0 | Status: SHIPPED | OUTPATIENT
Start: 2021-12-29 | End: 2022-01-07

## 2021-12-29 RX ORDER — LEVOFLOXACIN 500 MG/1
500 TABLET, FILM COATED ORAL EVERY EVENING
Status: DISCONTINUED | OUTPATIENT
Start: 2021-12-29 | End: 2021-12-30 | Stop reason: HOSPADM

## 2021-12-29 RX ORDER — WARFARIN SODIUM 2 MG/1
2 TABLET ORAL
Status: COMPLETED | OUTPATIENT
Start: 2021-12-29 | End: 2021-12-29

## 2021-12-29 RX ORDER — WARFARIN SODIUM 1 MG/1
2 TABLET ORAL DAILY
Qty: 90 TABLET | Refills: 0 | Status: SHIPPED | OUTPATIENT
Start: 2021-12-29 | End: 2022-01-11

## 2021-12-29 RX ORDER — MAGNESIUM OXIDE 400 MG/1
800 TABLET ORAL 2 TIMES DAILY
Qty: 120 TABLET | Refills: 0 | Status: SHIPPED | OUTPATIENT
Start: 2021-12-29 | End: 2022-01-13

## 2021-12-29 RX ORDER — PHENOL 1.4 %
10 AEROSOL, SPRAY (ML) MUCOUS MEMBRANE
Qty: 30 TABLET | Refills: 0 | Status: SHIPPED | OUTPATIENT
Start: 2021-12-29 | End: 2022-01-13

## 2021-12-29 RX ORDER — FLUCONAZOLE 2 MG/ML
400 INJECTION, SOLUTION INTRAVENOUS EVERY 24 HOURS
Qty: 3600 ML | Refills: 0 | Status: SHIPPED | OUTPATIENT
Start: 2021-12-31 | End: 2022-01-19

## 2021-12-29 RX ORDER — PANTOPRAZOLE SODIUM 40 MG/1
40 TABLET, DELAYED RELEASE ORAL
Qty: 60 TABLET | Refills: 0 | Status: SHIPPED | OUTPATIENT
Start: 2021-12-29 | End: 2022-01-28

## 2021-12-29 RX ORDER — ESCITALOPRAM OXALATE 20 MG/1
20 TABLET ORAL DAILY
Qty: 30 TABLET | Refills: 0 | Status: SHIPPED | OUTPATIENT
Start: 2021-12-29 | End: 2022-01-13

## 2021-12-29 RX ORDER — TRAZODONE HYDROCHLORIDE 50 MG/1
25 TABLET, FILM COATED ORAL
Qty: 60 TABLET | Refills: 0 | Status: SHIPPED | OUTPATIENT
Start: 2021-12-29 | End: 2022-01-19

## 2021-12-29 RX ORDER — WARFARIN SODIUM 1 MG/1
2 TABLET ORAL EVERY EVENING
Qty: 90 TABLET | Refills: 0 | Status: ON HOLD | OUTPATIENT
Start: 2021-12-29 | End: 2022-06-22

## 2021-12-29 RX ORDER — MULTIPLE VITAMINS W/ MINERALS TAB 9MG-400MCG
1 TAB ORAL DAILY
Qty: 30 TABLET | Refills: 0 | Status: SHIPPED | OUTPATIENT
Start: 2021-12-29 | End: 2022-01-13

## 2021-12-29 RX ORDER — HYDROXYZINE HYDROCHLORIDE 25 MG/1
25 TABLET, FILM COATED ORAL 3 TIMES DAILY PRN
Qty: 90 TABLET | Refills: 0 | Status: SHIPPED | OUTPATIENT
Start: 2021-12-29 | End: 2022-01-07

## 2021-12-29 RX ORDER — MYCOPHENOLATE MOFETIL 250 MG/1
1000 CAPSULE ORAL 2 TIMES DAILY
Qty: 240 CAPSULE | Refills: 0 | Status: SHIPPED | OUTPATIENT
Start: 2021-12-29 | End: 2022-01-13

## 2021-12-29 RX ORDER — ROSUVASTATIN CALCIUM 10 MG/1
10 TABLET, COATED ORAL DAILY
Qty: 30 TABLET | Refills: 0 | Status: SHIPPED | OUTPATIENT
Start: 2021-12-29 | End: 2022-01-13

## 2021-12-29 RX ORDER — NYSTATIN 100000/ML
1000000 SUSPENSION, ORAL (FINAL DOSE FORM) ORAL 4 TIMES DAILY
Qty: 1500 ML | Refills: 0 | Status: SHIPPED | OUTPATIENT
Start: 2021-12-29 | End: 2022-01-13

## 2021-12-29 RX ORDER — FLUTICASONE PROPIONATE 50 MCG
1 SPRAY, SUSPENSION (ML) NASAL DAILY
Qty: 16 G | Refills: 0 | Status: SHIPPED | OUTPATIENT
Start: 2021-12-29 | End: 2022-01-13

## 2021-12-29 RX ORDER — LEVOTHYROXINE SODIUM 25 UG/1
25 TABLET ORAL DAILY
Qty: 30 TABLET | Refills: 0 | Status: SHIPPED | OUTPATIENT
Start: 2021-12-29 | End: 2022-01-13

## 2021-12-29 RX ORDER — SULFAMETHOXAZOLE AND TRIMETHOPRIM 400; 80 MG/1; MG/1
1 TABLET ORAL DAILY
Qty: 30 TABLET | Refills: 0 | Status: SHIPPED | OUTPATIENT
Start: 2021-12-29 | End: 2022-01-13

## 2021-12-29 RX ORDER — LEVALBUTEROL TARTRATE 45 UG/1
2 AEROSOL, METERED ORAL EVERY 4 HOURS PRN
Qty: 15 G | Refills: 0 | Status: SHIPPED | OUTPATIENT
Start: 2021-12-29 | End: 2022-03-24

## 2021-12-29 RX ORDER — LEVALBUTEROL TARTRATE 45 UG/1
2 AEROSOL, METERED ORAL EVERY 4 HOURS PRN
Status: DISCONTINUED | OUTPATIENT
Start: 2021-12-29 | End: 2021-12-30 | Stop reason: HOSPADM

## 2021-12-29 RX ORDER — HYDROXYZINE HYDROCHLORIDE 25 MG/1
25 TABLET, FILM COATED ORAL 3 TIMES DAILY PRN
Status: DISCONTINUED | OUTPATIENT
Start: 2021-12-29 | End: 2021-12-30 | Stop reason: HOSPADM

## 2021-12-29 RX ORDER — LEVOFLOXACIN 500 MG/1
500 TABLET, FILM COATED ORAL DAILY
Qty: 30 TABLET | Refills: 0 | Status: SHIPPED | OUTPATIENT
Start: 2021-12-30 | End: 2022-01-19

## 2021-12-29 RX ORDER — AMLODIPINE BESYLATE 5 MG/1
5 TABLET ORAL DAILY
Qty: 30 TABLET | Refills: 0 | Status: SHIPPED | OUTPATIENT
Start: 2021-12-29 | End: 2022-01-13

## 2021-12-29 RX ADMIN — LEVOTHYROXINE SODIUM 25 MCG: 25 TABLET ORAL at 09:11

## 2021-12-29 RX ADMIN — Medication 5 ML: at 18:50

## 2021-12-29 RX ADMIN — PREDNISONE 10 MG: 10 TABLET ORAL at 09:12

## 2021-12-29 RX ADMIN — NYSTATIN 1000000 UNITS: 100000 SUSPENSION ORAL at 12:55

## 2021-12-29 RX ADMIN — LEVALBUTEROL HYDROCHLORIDE 1.25 MG: 1.25 SOLUTION RESPIRATORY (INHALATION) at 08:16

## 2021-12-29 RX ADMIN — MYCOPHENOLATE MOFETIL 1000 MG: 250 CAPSULE ORAL at 09:11

## 2021-12-29 RX ADMIN — FLUTICASONE PROPIONATE 1 SPRAY: 50 SPRAY, METERED NASAL at 09:19

## 2021-12-29 RX ADMIN — NYSTATIN 1000000 UNITS: 100000 SUSPENSION ORAL at 20:03

## 2021-12-29 RX ADMIN — ROSUVASTATIN CALCIUM 10 MG: 10 TABLET, FILM COATED ORAL at 09:15

## 2021-12-29 RX ADMIN — LEVOFLOXACIN 500 MG: 500 TABLET, FILM COATED ORAL at 20:03

## 2021-12-29 RX ADMIN — CEFTAZIDIME 2 G: 2 INJECTION, POWDER, FOR SOLUTION INTRAVENOUS at 18:05

## 2021-12-29 RX ADMIN — FLUCONAZOLE, SODIUM CHLORIDE 400 MG: 2 INJECTION INTRAVENOUS at 08:14

## 2021-12-29 RX ADMIN — CALCIUM CARBONATE 600 MG (1,500 MG)-VITAMIN D3 400 UNIT TABLET 1 TABLET: at 18:11

## 2021-12-29 RX ADMIN — FUROSEMIDE 40 MG: 40 TABLET ORAL at 09:20

## 2021-12-29 RX ADMIN — AMLODIPINE BESYLATE 5 MG: 5 TABLET ORAL at 09:21

## 2021-12-29 RX ADMIN — Medication 800 MG: at 16:39

## 2021-12-29 RX ADMIN — Medication 800 MG: at 12:57

## 2021-12-29 RX ADMIN — CALCIUM CARBONATE 600 MG (1,500 MG)-VITAMIN D3 400 UNIT TABLET 1 TABLET: at 09:11

## 2021-12-29 RX ADMIN — SULFAMETHOXAZOLE AND TRIMETHOPRIM 1 TABLET: 400; 80 TABLET ORAL at 09:12

## 2021-12-29 RX ADMIN — WARFARIN SODIUM 2 MG: 2 TABLET ORAL at 18:11

## 2021-12-29 RX ADMIN — PANTOPRAZOLE SODIUM 40 MG: 40 TABLET, DELAYED RELEASE ORAL at 09:14

## 2021-12-29 RX ADMIN — NYSTATIN 1000000 UNITS: 100000 SUSPENSION ORAL at 09:15

## 2021-12-29 RX ADMIN — TACROLIMUS 2 MG: 1 CAPSULE ORAL at 18:11

## 2021-12-29 RX ADMIN — MULTIPLE VITAMINS W/ MINERALS TAB 1 TABLET: TAB at 09:12

## 2021-12-29 RX ADMIN — ACETYLCYSTEINE 2 ML: 200 SOLUTION ORAL; RESPIRATORY (INHALATION) at 08:16

## 2021-12-29 RX ADMIN — PREDNISONE 10 MG: 10 TABLET ORAL at 20:03

## 2021-12-29 RX ADMIN — Medication 15 ML: at 10:39

## 2021-12-29 RX ADMIN — NYSTATIN 1000000 UNITS: 100000 SUSPENSION ORAL at 16:39

## 2021-12-29 RX ADMIN — Medication 1 TABLET: at 09:11

## 2021-12-29 RX ADMIN — TACROLIMUS 2 MG: 1 CAPSULE ORAL at 09:16

## 2021-12-29 RX ADMIN — CEFTAZIDIME 2 G: 2 INJECTION, POWDER, FOR SOLUTION INTRAVENOUS at 10:39

## 2021-12-29 RX ADMIN — ESCITALOPRAM OXALATE 20 MG: 20 TABLET ORAL at 09:11

## 2021-12-29 RX ADMIN — PANTOPRAZOLE SODIUM 40 MG: 40 TABLET, DELAYED RELEASE ORAL at 16:39

## 2021-12-29 RX ADMIN — MYCOPHENOLATE MOFETIL 1000 MG: 250 CAPSULE ORAL at 20:03

## 2021-12-29 RX ADMIN — CEFTAZIDIME 2 G: 2 INJECTION, POWDER, FOR SOLUTION INTRAVENOUS at 02:33

## 2021-12-29 ASSESSMENT — ACTIVITIES OF DAILY LIVING (ADL)
ADLS_ACUITY_SCORE: 23
ADLS_ACUITY_SCORE: 23
ADLS_ACUITY_SCORE: 22
ADLS_ACUITY_SCORE: 23
ADLS_ACUITY_SCORE: 23
ADLS_ACUITY_SCORE: 21
ADLS_ACUITY_SCORE: 21
ADLS_ACUITY_SCORE: 22
ADLS_ACUITY_SCORE: 23
ADLS_ACUITY_SCORE: 22
ADLS_ACUITY_SCORE: 23
ADLS_ACUITY_SCORE: 21
ADLS_ACUITY_SCORE: 21
ADLS_ACUITY_SCORE: 23

## 2021-12-29 ASSESSMENT — MIFFLIN-ST. JEOR: SCORE: 1419.58

## 2021-12-29 NOTE — PLAN OF CARE
Care Coordination:    Writer met with patient and significant other today in preparation for discharge tomorrow. Patient has a busy morning with PLC education for IV medications, PICC line, and anticoagulation. Patient med with [facility] Liaison today. Writer provided list of contacts for discharge. He received his transplant pharmacy education and had already received general transplant education. Patient and spouse did not have any questions for writer. Writer encouraged them to call writer if they had questions post-discharge. Transplant team aware of discharge tomorrow, 12/30/21.      Contacts for Discharge      PATIENT NAME:  Bret Thornton     Items Suppliers Additional information     Medications       [facility]   PH: [phone] A 30-day supply of medications was sent to the listed pharmacy.  Ongoing medication ordering will be by your primary care provider. Please attend the appointment scheduled on your behalf to ensure a smooth transition for medication refills.      Home Care      [facility]  PH: [phone]   Refer to other materials in your discharge paperwork for supply list and additional contact information. You will be receiving a home care RN, Physical Therapist, Occupational Therapist, Speech Therapist, and Home Health Aide. Call the 24-hour nurse line for immediate advisement.     Home Infusion Services   [facility]  PH: [phone] [facility] provides the supplies needed for IV medications. Call them if you have any questions.           Transplant Coordinator   Mady Marin RN  PH: [phone] Mady is your transplant coordinator. Call her with any questions or concerns.    Coordinated by:   Your Nurse Care Coordinator: Carolyne Adams RN, BSN, CRRN  [phone]               Carolyne Adams RN, BSN, CRRN  Patient Care Management Coordinator  Acute Rehabilitation Unit/Transitional Care Unit  PH: [phone]  Pager: [phone]

## 2021-12-29 NOTE — DISCHARGE INSTRUCTIONS
Follow-up Appointments:    - Pulmonary Transplant Team  You are scheduled for Labs on Monday, January 3rd at 7:15 AM, followed by an appointment with Pulmonology at 7:30 AM.    Address [facility]  Clinics and Surgery Center    [address] [place] [address], [address]    [place] [address]  Phone             [phone]     - Infectious Disease  You are scheduled for a virtual visit with Dr. Ming Abebe on Tuesday, January 18th at 6:00 PM. If you have any questions regarding this appointment or need to make any changes, please contact the clinic number below.     Address  [facility]  Phone   [phone]    - Neurology  You are scheduled to see Dr. Albarran on Wednesday, March 30th at 8:00 AM.    Address [facility] - Neurology    Clinics and Surgery Center    [address] [place] [address] [address]    [place] [address]  Phone  [phone]      ---------------------------------------------      Lab Draws  CBC, BMP, Mag, Tacrolimus, INR (every Monday/Thursday)       Home Care Services  [facility]  PH: [phone]  Fax: [phone]   Nurse, physical therapy, occupational therapy, and speech therapy    Home Infusion Services  [facility]   Ph: [phone]    [facility] supplies your IV medications and supplies.          To reduce the risk of subsequent stroke there are several important factors including optimal management of anticoagulants, blood pressure, cholesterol, diabetes and smoking abstinence.    Anticoagulation:  You are on Warfarin  Target INR 2-3    Blood Pressure:  Keeping your blood pressures less than 130/80 has been shown to reduce risk of recurrent stroke. Recording your blood pressure and heart rate once daily in a log book can help you and your providers make decisions on optimal management. You are encouraged to bring your log book with you to your primary physician and/or cardiology doctor  "visits.    You are currently on amlodpine to help control your blood pressure. Several lifestyle modifications have been associated with blood pressure reduction and are an important part of a comprehensive plan. These include: weight loss (if over-weight); a diet low in salt and cholesterol and rich in fruits and vegetables; regular aerobic physical activity and limited alcohol consumption.    Diabetes:  Optimal management of diabetes not only reduces risk of another stroke, it can help with healing process from your recent stroke. Blood glucose levels measure how well you're controlling your diabetes. An additional test \"hemoglobin A1C\" reflects how you have been overall with glucose control in the prior few months. This should be less than 7 though even lower below 6 is considered normal. Your recent Hgb A1C was   Lab Results   Component Value Date    A1C 5.3 11/15/2021    A1C 6.6 09/07/2021    A1C 6.5 09/05/2021      This should be followed up with your primary provider and/or endocrinologist.    Your present plan is to check blood glucose consistently Before Meals and at Bedtime. These should be recorded along with date/time and any relevant notes such as food consumed, insulin/medication taken etc. This helps you and your providers make decisions on optimal management. You're encouraged to bring you log book with to your primary and/or endocrinology doctor visits.  Your current medications include Insulin Glargine (Lantus). Specific doses and frequencies listed elsewhere.     Diet:  Diet and exercise are very important for diabetes regardless of being on medication or not. Be aware of and moderate your carbohydrate intake as instructed by doctor, dietitian or nurse.  Regular physical activity may be more difficult since your stroke though doing what you can on a daily basis is helpful.     Cholesterol:  Traditional target levels for LDL cholesterol or \"bad cholesterol\" is less than 130 however once you have " "had a stroke, your target LDL level is now less than 70. Additional recommendations such as increasing your HDL or \"good\" cholesterol and lowering your triglyceride level can also be important.    Your most recent lipid panel was   Lab Results   Component Value Date    CHOL 214 09/07/2021     Lab Results   Component Value Date    HDL 51 09/07/2021     Lab Results   Component Value Date     09/07/2021     Lab Results   Component Value Date    TRIG 307 10/22/2021     This should be followed up in 2-3 months with your primary provider.    Smoking:  Continue to remain tobacco free.  "

## 2021-12-29 NOTE — PROGRESS NOTES
[facility]  NURSE LIAISON NOTE  Met with patient and partner at bedside. He is expected to DC tomorrow am.  Educated on I role in Pt DC to home.  They state they are comfortable doing home infusion and is able to infusion independently.  HP  Mock Teach, syringe-air removal by spouse.  She went through all steps of HP ABX administration, flushing and proper sequence of ABX step for administration. She needed several cues on process and technique. Pt was very shaky, and not able to perform the infusion at this time. They have PLC tomorrow am, and will have a SNV on Friday AM for reinforcement of education.   Educated to keep dressing CDI, and to cover dressing during bathing. Encouraged to call [facility] for any questions, clarifications or problems.  Educated on Deliveries, importance of refrigerating medications. They were engaged during this RN Visit in hospital room.  He states they are comfortable doing Home Infusion.  They understand to expect possible phone calls from [facility].  Education provided on RN/RPH on call 24/7, phone number provided.  Educated to review [facility] folder and contents, including Service Agreement and Consents, and educational Materials.  He verbalizes understanding of above.  DC: Thursday am.  DC Therapies-Diflucan q 24 hrs, via HP, dosing at 0900  Delivery/Supplies: to pt home  LINES/TUBES: TLNV PICC  AGENCY: [facility]  SNV: Friday am 8-9am    Dianne Tomlin [facility]-Nurse Liaison  [email or web address]  My Cell:  [phone]  M-F  [facility] OFFICE  24/hrs  [phone]

## 2021-12-29 NOTE — PHARMACY-ANTICOAGULATION SERVICE
Clinical Pharmacy- Warfarin Discharge Note  This patient is currently on warfarin for the treatment of Atrial fibrillation.  INR Goal= 2-3    Anticoagulation Dose History     Recent Dosing and Labs Latest Ref Rng & Units 12/22/2021 12/23/2021 12/24/2021 12/25/2021 12/26/2021 12/27/2021 12/28/2021    SHARRIZ IMS TEMPLATE - - - - - - 1.5 mg 1.5 mg    Warfarin 2 mg - 2 mg 2 mg 2 mg 2 mg 2 mg - -    INR 0.86 - 1.14 2.36(H) 2.43(H) 2.48(H) 2.30(H) 2.61(H) 2.71(H) 2.82(H)        Vitamin K doses administered during the last 7 days: none  FFP administered during the last 7 days: none    Recommend discharging the patient on a warfarin regimen of 2 mg daily until next INR recheck with a prescription for warfarin 1mg tablets.      The patient should have an INR checked Monday, January 3rd.    Pamela Alicia, PharmD, BCPS

## 2021-12-29 NOTE — PLAN OF CARE
FOCUS/GOAL  Medical management    ASSESSMENT, INTERVENTIONS AND CONTINUING PLAN FOR GOAL:  Patient is alert and oriented, able to make needs known, used call light appropriately. Requested PRN sleep aide at the start of the shift, slept comfortably overnight, awaken in between toilet needs and medication administration, minimized noise and clustered care to let patient sleep more at night. Patient denied pain, chest pain, N&V, headache, no respiratory distress. Urinal at bedside, independently used, emptied by staff, voiding well, no BM this shift. 3 side rails UP, bed alarm ON, call light in reach, safety rounding checked implemented, no concern at this time, may continue with POC.

## 2021-12-29 NOTE — PROGRESS NOTES
"  [facility]   Acute Rehabilitation Unit  Daily progress note    INTERVAL HISTORY  No acute events overnight.  This morning Bret has no new concerns or complaints.  He is seen going through his medications and checking them with his med list, although tremors are significant this morning.  However his list states that Coumadin is \"Discontinued\", although I explained to him that he will be continuing with the Coumadin but may be showing up like that because the doses change based on INR values.  Discharge is planned for tomorrow, and he says he feels ready.  Denies chest pain and feels dyspnea with exertion is improving.  Will complete PICC and anticoagulation education classes prior to leaving.  Family training to occur today in the afternoon.    MEDICATIONS  Scheduled meds    - Medication Assessment Program - Rehab Services   Does not apply See Admin Instructions     amLODIPine  5 mg Oral Daily     calcium carbonate 600 mg-vitamin D 400 units  1 tablet Oral or Feeding Tube BID w/meals     cefTAZidime  2 g Intravenous Q8H     escitalopram  20 mg Oral or Feeding Tube Daily     fluconazole  400 mg Intravenous Q24H     fluticasone  1 spray Both Nostrils Daily     folic acid-vit B6-vit B12  1 tablet Oral Daily     furosemide  40 mg Oral Daily     heparin lock flush  5-20 mL Intracatheter Q24H     insulin glargine  7 Units Subcutaneous QAM     levofloxacin  500 mg Oral QPM     levothyroxine  25 mcg Oral Daily     magnesium oxide  800 mg Oral BID     multivitamin w/minerals  1 tablet Oral Daily     mycophenolate  1,000 mg Oral BID     nystatin  1,000,000 Units Swish & Swallow 4x Daily     pantoprazole  40 mg Oral BID AC     [START ON 1/2/2022] predniSONE  10 mg Oral Daily     predniSONE  10 mg Oral or Feeding Tube BID     [START ON 1/2/2022] predniSONE  7.5 mg Oral QPM     rosuvastatin  10 mg Oral or Feeding Tube Daily     sodium chloride (PF)  10-40 mL Intracatheter Q8H     " "sulfamethoxazole-trimethoprim  1 tablet Oral or Feeding Tube Daily     tacrolimus  2 mg Oral BID IS     warfarin ANTICOAGULANT  2 mg Oral ONCE at 18:00       PRN meds:  acetaminophen, dextrose, glucose **OR** dextrose **OR** glucagon, heparin lock flush, hydrOXYzine, levalbuterol, melatonin, naloxone **OR** naloxone **OR** naloxone **OR** naloxone, ondansetron, - MEDICATION INSTRUCTIONS -, senna-docusate, sodium chloride (PF), sodium chloride (PF), traZODone, Warfarin Therapy Reminder, zinc-white petrolatum      PHYSICAL EXAM  /83 (BP Location: Right arm)   Pulse 86   Temp (!) 96.7  F (35.9  C) (Oral)   Resp 20   Ht 1.646 m (5' 4.8\")   Wt 66.6 kg (146 lb 12.8 oz)   SpO2 97%   BMI 24.58 kg/m       Gen: Awake, coooperative NAD  HEENT: Trach site now healed, mmm  Cardio: RRR, S1+S2, no m/r/g  Pulm: Non-labored breathing at rest, with lungs clear diminished at bases.  Abd: Soft, non-tender to palpation, bowel sounds present. Dressing over prior PEG tube site  Ext: No edema in lower or upper extremities b/l, no calf tenderness  Neuro/MSK: alert, oriented, answers questions appropriately, follows commands. Tremor present.      LABS  Recent Labs   Lab 12/29/21  0819 12/28/21  2108 12/28/21  1707 12/28/21  0822 12/28/21  0727 12/27/21  0828 12/27/21  0641 12/26/21  0616 12/26/21  0610 12/23/21  0815 12/23/21  0714   WBC  --   --   --   --   --   --  11.3*  --   --   --  8.8   HGB  --   --   --   --   --   --  11.0*  --   --   --  10.3*   MCV  --   --   --   --   --   --  88  --   --   --  89   PLT  --   --   --   --   --   --  177  --   --   --  187   INR  --   --   --   --  2.82*  --  2.71*  --  2.61*   < > 2.43*   NA  --   --   --   --   --   --  139  --   --   --  141   POTASSIUM  --   --   --   --   --   --  4.4  --   --   --  4.5   CHLORIDE  --   --   --   --   --   --  106  --   --   --  107   CO2  --   --   --   --   --   --  26  --   --   --  26   BUN  --   --   --   --   --   --  24  --   --   --  29 "   CR  --   --   --   --   --   --  0.89  --   --   --  0.89   ANIONGAP  --   --   --   --   --   --  7  --   --   --  8   ERIK  --   --   --   --   --   --  9.5  --   --   --  9.2   GLC 86 91 144*   < >  --    < > 132*   < >  --    < > 130*   ALBUMIN  --   --   --   --   --   --   --   --   --   --  2.8*   PROTTOTAL  --   --   --   --   --   --   --   --   --   --  6.0*   BILITOTAL  --   --   --   --   --   --   --   --   --   --  0.2   ALKPHOS  --   --   --   --   --   --   --   --   --   --  91   ALT  --   --   --   --   --   --   --   --   --   --  31   AST  --   --   --   --   --   --   --   --   --   --  22    < > = values in this interval not displayed.     No results found for this or any previous visit (from the past 24 hour(s)).    ASSESSMENT AND PLAN    Edson Thornton is a 56 year old right hand dominant male with a hx of NSIP/ILD associated with rheumatoid lung disease, hypertension, hyperlipidemia, RA who initially presented 9/5/2021 with acute on chronic respiratory failure for transplant work-up s/p bilateral lung transplant (10/16/21) with prolonged hospitalization associated with multifocal bilateral acute/subacute ischemic infarcts of bilateral frontal, occipital lobes.  Postoperative course complicated by prolonged ventilator wean s/p trach and PEG placement (10/29), encephalopathy, new Afib with RVR, BRENNAN, candidemia/Candida empyema, and brief bradycardic arrest with subsequent hypotension in the setting of GI bleed.  [facility] and was decannulated 12/8/2021.  He is currently stable on room air at admission to acute rehab.  He presents with right upper extremity weakness, bilateral dysmetria/tremors, acute on chronic severe deconditioning, dyspnea on exertion, and impairment of ADLs and gait.     Admission to acute inpatient rehab 12/13/2021  .    Impairment group code: Stroke Ischemic 01.3 Bilateral Involvement: embolic CVA affecting B cerebral  hemispheres and L cerebellum, as well as s/p bilateral lung transplantation     1. PT, OT, SLP for 60 minutes of each on a daily basis, in addition to rehab nursing and close management of physiatrist.   2. Impairment of ADLs/IADLs:  OT for 60 min daily to work on upper and lower body self care, dressing, toileting, bathing, energy conservation techniques with use of ADs as needed.   3. Impairment of mobility:   PT for 60 min daily to work on gait exercises, strengthening, endurance buildup, transfers with use of walker as needed.   4. Impairment of cognition:  SLP for 60 minutes for cognitive evaluation and treatment strategies for higher level cognitive deficits, and memory impairment.  5. Rehab RN to administer medication, patient education on medication taking, VS monitoring, bowel regimen, glucose monitoring and wound care/surgical wound dressing changes and monitoring.   6. Medical Conditions - Hospitalist team consulted, appreciate assistance     Neuro/Psych  #Multifocal acute to subacute ischemic stroke, etiology likely embolic vs perioperative  Initially noted 10/22 and 11/6 with stroke code called for change in exam.  MRI brain 10/23 with infarcts noted in both cerebral hemispheres (R frontal, L corona radiata, bilateral occipital), left cerebellum, presumed associated with new Afib vs perioperative. Bilateral upper extremity paresis (R>L), suspicion for some cortical blindness  - Stroke risk factor management:              -Warfarin anticoagulation for Afib, appreciate pharmacy assistance with dosing              -BP goals: SBP<140              -continue Rosuvastatin 10 mg qday              -Not smoking              -Hgb A1c: 6.6%        #Encephalopathy  #Sleep  -Delirium precautions  -Now completed Seroquel weaning (12/22)  -Melatonin 10mg qhs  -Trazodone 25mg prn     #Mood  #History of anxiety and depression  - PTA Lexapro increased to 10 mg while in acute hospital, increased to 20mg 12/14, not using  will discontinue hydoxyzine         Pulmonary  #ILD, NISP, s/p bilateral lung transplant (10/16/2021)  #Acute on chronic respiratory failure s/p tracheostomy (10/29/21) and decannulation (12/8/21)  #Bilateral Pleural Effusions  -Sternal precautions until 12 wks post op (1/8/2022)  -Immunosuppression:              -Continue tacrolimus  goal 8-12              -Continue MMF 1000mg BID              -Continue prednisone - Taper plan per pulmonary.  Currently on 10 mg 10 mg BID (next taper 1/2/22)  -Monitoring lab/imaging              -DSA q2wks              -Coccidioides Ag per transplant completed 12/17 - Per ID, no further need to check monthly              -CMV, EBV per transplant team              -CXR, CBC, BMP, Tac level M/Th  -Continue Levalbuterol TID, mucomyst- until discharge, then change to Xopenex MDI PRN  -Pulmonary toilet, encourage IS  -Continue Lasix 40mg qday     #SALTY  -Trach site now healed.  IM discussed with pulmonology, no indication for CPAP or BiPAP at this time. Consider outpatient sleep study if indicated.    ID  #Immunosuppression Prophylaxsis   -Bactrim x6 months  -Nystatin x6 months     #L lung cavitary lesion  #Hospital-acquired pneumonia with Klebsiella pneumoniae, Pseudomonas fluorescens/putida  Suspected aspiration versus pseudomonal vs Klebsiella pneumonia related to abscess.  CT  11/22 showing new cavitary lesion in left lung base, multifocal bilateral upper lobe groundglass opacity, and new 1.8 cm fluid collection with surrounding fat stranding in left axilla, decreased bilateral loculated pleural effusions.  QuantiFERON gold indeterminate, negative tuberculin skin test on multiple occurrences.  Underwent BAL on 11/22 with cultures growing Klebsiella and Pseudomonas fluorescens/putida, resistant to meropenem.  ID was consulted and feels this is likely fungal.  B-D glucan positive with known candidemia.  Cocci antigen in urine negative, serum histoplasmosis negative, CRAG  negative  -Follow-up chest completed CT 12/20   1. Overall decreased (since 11/22/2021 chest CT) size and number of bilateral infectious/inflammatory groundglass opacities. 2. Decreased but persistent left lower lobe cavitary lesion.  -Followed up with Dr. Abebe 12/21, recs appreciated   -Continue fluconazole for two additional weeks (until 1/18/2022) to complete 8 weeks of therapy for Genevieve empyema.    -Continue ceftazidime for four additional weeks until 1/18/2022 however, when ready for discharge he may be discharged on PO levaquin 500 mg daily.    -Repeat CT chest without contrast 1/18/2022 before his appointment with Dr. Abebe (1/18/22 at 6:00pm via virtual visit)   -No need to check monthly Coccidioides AG.      #Invasive Candida albicans candidiasis  Positive Candida blood cultures 10/20, 10/22.  Fungitell positive (399).  TC 10/23 without evidence of endocarditis. Ophthalmic consulted 10/24, benign funduscopic exam.  Candida empyema 10/25.  Repeated pleural effusion fungal cultures and fungal/bacterial blood cultures have been without growth since 11/18.  Initially on micafungin (10/22-10/27) before transition to fluconazole IV  -Continue fluconazole for two additional weeks (until 1/18/2022) to complete 8 weeks of therapy for Genevieve empyema per ID follow up recs 12/21     #hx of HSV  Chronic intermittent infection pretransplant, with recent HSV infection while in acute hospital (crusted lesions along left jaw) s/p 10d treatment completed 10/9     Cardiovascular  #Afib  Initially noted 10/18, converted to NSR with amio ggt. Metoprolol and amiodarone discontinued due to bradycardia.  -Warfarin as dosed by pharmacy  -will complete anticoagulation class prior to discharge      #Hypertension  -continue PTA amlodipine 5 mg daily  -also on lasix 40 mg daily      Heme  #Leukocytosis  Previously 13.2 on 12/14, then decreased but mildly elevated at 11.3 on 12/27.  No signs of infection at this time.  Monitor for  now.      #R subclavian DVT  -Warfarin anticoagulation, pharmacy to dose     #Hypogammaglobulinemia  S/p IVIG  -Repeat IgG 12/15 365  -Repeat ~12/30     Endocrine  #Type 2 diabetes  #Steroid-induced hyperglycemia  -Endocrine consulted while in acute hospital.  Tube feeds on hold starting 12/20 and PEG/J tube now removed.    - Decrease Lantus to 7 units daily with lower glucose values.  May be able to discontinue in the future, especially as steroids decrease,   - discontinue sliding scale insulin  - titrate as indicated- anticipate decrease/dc long acting insulin with steroid taper.      #Hypothyroidism  TSH 3.17 on 11/19  -Continue levothyroxine 25 mcg daily     Renal  #BRENNAN, resolved  Cr 0.89 12/27, stable  -Monitor intermittently     MSK/Rheum  #Rheumatoid arthritis  Previously treated with rituximab.  Rheumatology familiar and consulted early in admission.  -Steroids as above     GI/FEN  #Severe malnutrition in the context of acute on chronic illness or injury and evidenced by >7.5% weight loss in the past 3 months   -PEG/J tube removed 12/24 at bedside with improved PO intake.  -Continue multivitamin, folate, B6, B12 supplements     #hx of GI Bleed  Patient with progressive hypotension and CODE BLUE for bradycardia/asystole associated with GI bleed in the setting of anticoagulation, requiring massive transfusion protocol on 10/22.  EGD on 11/2 with clotted blood in stomach, adherent clot near PEG site that was clipped without active bleeding.  Most recent EGD 11/5 with ulcer noted near the PEG bumper site, gastritis, suction marks from the G-tube.  GJ tube was exchanged 11/9 by GI. Hgb improved to 11 on 12/27  -Monitor hemoglobin intermittently.  -Continue PPI BID      Bowel/Bladder  Bowel, continent  Constipation  - Senna-docusate 1-2 tablets BID  - Miralax QDAY PRN  - Bisacodyl suppository 10mg QDAY PRN     Bladder, continent     1. Adjustment to disability:  Clinical psychology to eval and treat as  indicated  2. FEN: Regular with thin liquids  3. Bowel: continent, meds as above  4. Bladder: continent  5. DVT Prophylaxis: warfarin  6. GI Prophylaxis: PPI  7. Code: full  8. Disposition: Local housing - [facility]  9. ELOS:  Tentative discharge date 12/30/21  10. Rehab prognosis:  good  9. Follow up Appointments on Discharge:                -Neurology 1-2 months after discharge               -Outpatient cardiac/pulmonary Rehab               -Transplant Coordinator               -ID 1/18 with repeat CT chest  Prior    Lan Coronado MD  Department of Rehabilitation Medicine    Time Spent on this Encounter   I, Lan Coronado, spent a total of 25 minutes face-to-face or managing the care of Edson Thornton. Over 50% of my time on the unit was spent counseling the patient and coordinating care. See note for details.

## 2021-12-29 NOTE — PLAN OF CARE
Speech Language Therapy Discharge Summary    Reason for therapy discharge:    Discharged to home with home therapy.    Progress towards therapy goal(s). See goals on Care Plan in [facility] electronic health record for goal details.  Goals partially met.  Barriers to achieving goals:   discharge from facility.    Therapy recommendation(s):    Continued therapy is recommended.  Rationale/Recommendations:  Ongoing therapy will targeting return to highest level of functional independence with goals targeting cognition in the areas of problem solving, attention, and short-term/working recall..

## 2021-12-29 NOTE — PROGRESS NOTES
[facility]    Progress Note - Hospitalist.        Date of Admission:  12/13/2021    Assessment & Plan             Mr. 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    Today's changes: 12/29/2021  No acute events overnight.   No new issues.   NO new changes.     See Pulmonary recommendations from today 12.29 regarding discharge and follow-up planning.      # ILD and NSIP s/p BSLT on 10/16/2021  # Acute on chronic hypoxic respiratory failure s/p tracheostomy on 10/29/21 and decannulation on 12/8/2021  # Bilateral pleural effusions, improved.  - Pulmonary team following the patient.   - No need for BiPAP, he can continue on CPAP at night and follow-up with sleep medicine as outpatient.      Prophylaxis:   - Bactrim for PJP ppx   - Nystatin for oral candidiasis ppx, 6 month course  - Monthly EBV, CMV labs (last sent on 12/15/21, negative for both)     -Immunosuppression: s/p induction therapy with basiliximab 10/16 (and high dose IV steroid) and 10/20 Continue tacrolimus 2 mg BID. Goal level 8-12.  -MMF 1000 mg BID   -Prednisone 10 mg BID, next taper due 1/2/22   - qM/Th CBC/BMP tacrolimus levels, fluconazole continued per ID     Positive cross match:   Positive DSA: Note that he received two doses of rituximab in June, which is likely contributing to cross match result.  DSA newly positive 11/29 with DQB5 (mfi 2522), decreased (mfi 1873) on 12/6. DSA on 12/12, decreased further to 1703.   - Repeat DSA (12/26) every two weeks     Others:   - Levalbuterol and mucomyst TID and pulm  toilet and chest PT BID  - PT/OT/SLP -@ PM&R team.   - Continue 40 mg of Lasix daily  - Oxycodone discontinued   - Acetaminophen PRN 975mg tid     # Left lung cavitary lesion   # Klebsiella pneumoniae and Pseudomonas fluorescens/putida HAP  #Candida empyema  aspiration vs pseudomonal vs Klebsiella pneumonia related abscess. Indeterminate Quant Gold, but negative tuberculin skin tests on mulitple occurences. S/p BAL on 11/22.Cocci antigen in urine negative, serum, BAL histoplasmosis negative, CRAG negative, respiratory viral panel (-), TB (-), candida growth from bal culture. Transplant ID consulted, saw the patient on 12/21.      - Continue IV Ceftazdime 2 grams Q8H (until 1/18/22), switch to levofloxacin 500mg PO after discharge from ARU  - Continue IV fluconazole till 1/18/22  - Follow up CT chest on 1/18/22 (ordered)  - Follow up with Dr. Abebe on 1/18/2022 @6:00 PM in a virtual visit.      # Hypogammaglobinemia:   IgG previously low at 364 (9/7).  Noted at 265 at time of transplant, s/p IVIG 10/21, repeat IgG (11/17) 198, s/p IVIG (with premedication) 11/18. Repeat IVIG on 12/15 of 365;   Per Pulm: IVIG indicated but ARU  unable to administer IVIG and patient is unable to leave to receive it in an infusion center.  - Repeat IgG ~ 12/30  - Further per Transplant team.     # Encephalopathy, resolved  # Difficulty sleeping  Likely multi-factorial in the setting of stroke, prolonged critical illness.  - Ct Seroquel 25 mg TID prn and 50 mg at bedtime. Wean down as tolerated.   - Melatonin 10 mg at bedtime  - Trazodone 25 mg prn.   - Alert and oriented. Encephalopathy resolved.      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 MRI brain on 10/23 with multifocal subacute infarct within both cerebral hemispheres and left cerebellum. Presumed embolic with afib hx, identified while inpatient. He is currently able to ambulate with  relative ease.     - Continue warfarin per pharmacy protocol  - INR therapeutic    #Upper extremity tremors   -Most likely multifactorial related to previous strokes and tacrolimus. Tremors are chronic.   -Follow-up as outpatient.       # Right subclavian DVT   Diagnosed on 11/4 on ultrasound.  - Continue warfarin per pharmacy protocol  - INR therapeutic.      # DM2 with steroid induced hyperglycemia  -BG stable.   -Continue on current insulin regimen.      # Severe malnutrition in the context of acute on chronic illness   - Feeding tube removed.      # Anxiety and depression   Psychiatry reconsulted and after discussion suggest increasing lexapro. Will discuss with patient regarding starting ritalin in the AM for motivation.  -Ct Lexapro 20 mg daily   -As needed hydroxyzine.     # Rheumatoid arthritis- Dx 5/2021 with + CCP antibody and RF. Previously treated with rituximab. Rheum consulted early in admission. On steroids as noted above.      # SALTY   -Continue on CPAP  -Consider sleep study as outpatient     # HTN  - Continue PTA amlodipine.   Stable.      # Hypothyroidism - TSH 3.17 on 11/19/21.   -Continue PTA levothyroxine 25 mcg daily.     # Recurrent GIB - hgb drop on 10/22 s/p 2 unit pRBC transfusion with EGD on 10/23 with NJ/OG tube trauma with scant oozing. Patient then developed progressive hypotension and ultimately CODE BLUE for GIB in setting of anticoagulation with heparin drip, s/p massive transfusion protocol. EGD on 11/2 with large amount of clotted blood in stomach and area of raised mucosa with small adherent clot near PEG tube site that was clipped, no active bleeding.  Maroon stools 11/3, repeat EGD with extensive old blood in stomach, no active bleeding, small nodular area with prior clips clipped again.  Most recent EGD 11/5 with ulcer noted at PEG tube bumper site, gastritis, and suction marks from G tube. TF noted in G tube drainage bag 11/7, AXR with GJ tube tip projecting over proximal  duodenum. GJ tube exchanged 11/9 by GI.     -PO PPI BID   -HGB stable.    .   # Hypomagnesemia: Suppressed reabsorption 2/2 CNI.  Requiring intermittent IV and PO replacement.  - Mag-Ox 800 mg qAM / 400 mg qPM (increased 12/8)        Diet: Room Service  Regular Diet Adult Thin Liquids (level 0)  Snacks/Supplements Adult: Nepro Oral Supplement; With Meals  Diet    DVT Prophylaxis: Warfarin  Watson Catheter: Not present  Fluids: None  Central Lines: None  Code Status: Full Code      Disposition Plan   Expected Discharge: 12/30/2021     Anticipated discharge location:  Awaiting care coordination huddle   Delays: None         Sadiq Valdez MD  TCU Service  [facility]  Securely message with the Vocera Web Console (learn more here)  Text page via [employer] Paging/Directory    ______________________________________________________________________    Interval History     Interval events reviewed.   States doing better  Denies fever  No cp or sob  No LH or dizziness.   No NV or pain abdomen.   No new sensory or motor complaint.   No new rash.  Stable mood.     No other new or acute medical concern       Data reviewed today: I reviewed all medications, new labs and imaging results over the last 24 hours. I personally reviewed no images or EKG's today.    Physical Exam   Vital Signs: Temp: (!) 96.7  F (35.9  C) Temp src: Oral BP: 138/83 Pulse: 86   Resp: 20 SpO2: 97 % O2 Device: None (Room air)    Weight: 146 lbs 12.8 oz  General Appearance:  AAOx4, room air, NAD, appears comfortable  Respiratory: Normal respiratory effort, clear lungs.   Cardiovascular: S1, S2, rrr  GI: Soft, non-tender, non distended, + BS.  Neuro: Alert, oriented x 3, moving all extremities, mild upper extremity tremors.     Data   Recent Labs   Lab 12/29/21  0819 12/28/21  2108 12/28/21  1707 12/28/21  0822 12/28/21  0727 12/27/21  0828 12/27/21  0641 12/26/21  0616 12/26/21  0610 12/23/21  0815 12/23/21  0714    WBC  --   --   --   --   --   --  11.3*  --   --   --  8.8   HGB  --   --   --   --   --   --  11.0*  --   --   --  10.3*   MCV  --   --   --   --   --   --  88  --   --   --  89   PLT  --   --   --   --   --   --  177  --   --   --  187   INR  --   --   --   --  2.82*  --  2.71*  --  2.61*   < > 2.43*   NA  --   --   --   --   --   --  139  --   --   --  141   POTASSIUM  --   --   --   --   --   --  4.4  --   --   --  4.5   CHLORIDE  --   --   --   --   --   --  106  --   --   --  107   CO2  --   --   --   --   --   --  26  --   --   --  26   BUN  --   --   --   --   --   --  24  --   --   --  29   CR  --   --   --   --   --   --  0.89  --   --   --  0.89   ANIONGAP  --   --   --   --   --   --  7  --   --   --  8   ERIK  --   --   --   --   --   --  9.5  --   --   --  9.2   GLC 86 91 144*   < >  --    < > 132*   < >  --    < > 130*   ALBUMIN  --   --   --   --   --   --   --   --   --   --  2.8*   PROTTOTAL  --   --   --   --   --   --   --   --   --   --  6.0*   BILITOTAL  --   --   --   --   --   --   --   --   --   --  0.2   ALKPHOS  --   --   --   --   --   --   --   --   --   --  91   ALT  --   --   --   --   --   --   --   --   --   --  31   AST  --   --   --   --   --   --   --   --   --   --  22    < > = values in this interval not displayed.

## 2021-12-29 NOTE — PLAN OF CARE
FOCUS/GOAL  Medication management    ASSESSMENT, INTERVENTIONS AND CONTINUING PLAN FOR GOAL:  On Map. Pt called for am meds on time and picked out correct medications with increased time. Needing only one correction; pt picked out tacrolimus 1mg, needed 2mg. Educated pt and updated med card.   Pt forgot to call at noon. Reviewed med list w/ pt.  BG 86 before breakfast. BG 99 before lunch. Lantus dose decreased today. Pt demonstrated lantus administration although having hand tremors.   CGA and walker for mobility. Continent of bowel and bladder, used toilet. LBM today.   IV abx given via picc without problem.   VSS. Pt participating in therapies.   Plan for PLC class for IV med/picc care and coumadin tomorrow and discharge to [facility].

## 2021-12-29 NOTE — PLAN OF CARE
FOCUS/GOAL  Medication management, Skin integrity, Carryover of rehab techniques, and Safety management    ASSESSMENT, INTERVENTIONS AND CONTINUING PLAN FOR GOAL:  Pt was A/O, able to use call light and make needs known. Denies pain, SOB, chest pain, N/V, nor dizziness. VSS. CGA walker with transfers. On regular diet, thin liquids, takes medicines. Cont of BL, no BM thi shift, LBM-12/27/21. PICC line in place, blood return noted, dressing CDI. Old trach site, KIM, old PEG tube site dressing CDI. BG monitored. On MAP, called on time and identified meds to take correctly. Will continue with current POC.     I have personally performed a face to face diagnostic evaluation on this patient. I have reviewed the ACP note and agree with the history, exam and plan of care, except as noted.

## 2021-12-29 NOTE — PLAN OF CARE
"Physical Therapy Discharge Summary    Reason for therapy discharge:    Discharged to [facility] with HCPT    Progress towards therapy goal(s). See goals on Care Plan in [facility] electronic health record for goal details.  Goals met    Therapy recommendation(s):    Continued therapy is recommended.  Rationale/Recommendations:  Pt is still significantly deconditioned and has dyspnea with small amounts of exertion. Recommending ongoing HCPT and eventual progression to OP Pulmonary Rehab to maximize CP function and return to PLOF..     Discharge Plan: [facility] with jarret, HCPT, then [facility] [place]     Precautions: Falls, dyspnea on exertion     Current Status:  Bed Mobility: IND  Transfer: MOD I with 4WW  Gait: MOD I 175 ft with 4WW  Stairs: 4 stairs B rails, MOD I  Balance: Runs into objects on R side, 50% of the time. Able to self correct.     30 seconds STS from 20\" mat using hands on FWW 12/19/21 = 6, OMNI 3/10           STS from 20\" mat 12/27 = 8x with hands on  Thighs , OMNI 5/10  TUG 12/19/21 = 20 seconds, OMNI 7/10           12/27/21 = 15.8 seconds, OMNI 2/10     4WW - arrriving tomorrow  Family training for completed  "

## 2021-12-29 NOTE — PLAN OF CARE
Occupational Therapy Discharge Summary    Reason for therapy discharge:    Discharged to [facility]  with HCOT    Progress towards therapy goal(s). See goals on Care Plan in [facility] electronic health record for goal details.  Goals Met    Therapy recommendation(s):    Continued therapy is recommended.  Rationale/Recommendations:  Pt has a low activity tolerance and endurance. He is deconditioned and requires many rest breaks due to dyspnea. Recommending HCOT to return to PLOF and have increased independence with ADLs/IADLs. .

## 2021-12-29 NOTE — PROGRESS NOTES
Referral Date:12/29/21  Physician Responsible for Anticoagulation:Dr. Woods  Indication: S/P lung transplant, DVT                            Goal Range: 2-3                           Duration:indefinite  INR Referral is in EPIC:    y      Standing Lab Orders are in EPIC:   No, patient at Rehab  Patient Contacted: no, inpatient  Appropriate for Education yes, seen in Pt. Learning center  Topic covered include:  Introduction to coumadin     Proper Administration                       Lab Testing   Signs/Symptoms of Bleeding           Signs/Symptoms of Clotting or Stroke  Dietary Intake of Vitamin K               Drug  Interactions  Future Medical or Dental Procedures  no  Currently on Lovenox:  n  Currently on Warfarin: y   Patient will Have Home care TBD   Patient in a TCU or Assisted Living no  [place] or Outside Lab:Outside (lives in [place])  New Recommended Warfarin Regimen:TBD  Next Scheduled Labs: 1/3/22  HIPAA Release Form Sent:   no   Welcome Letter and Warfarin Educational Packet Sent:  yes

## 2021-12-30 ENCOUNTER — TELEPHONE (OUTPATIENT)
Dept: ANTICOAGULATION | Facility: CLINIC | Age: 56
End: 2021-12-30
Payer: COMMERCIAL

## 2021-12-30 ENCOUNTER — APPOINTMENT (OUTPATIENT)
Dept: EDUCATION SERVICES | Facility: CLINIC | Age: 56
End: 2021-12-30
Attending: PHYSICAL MEDICINE & REHABILITATION
Payer: COMMERCIAL

## 2021-12-30 ENCOUNTER — APPOINTMENT (OUTPATIENT)
Dept: SPEECH THERAPY | Facility: CLINIC | Age: 56
End: 2021-12-30
Attending: PHYSICAL MEDICINE & REHABILITATION
Payer: COMMERCIAL

## 2021-12-30 ENCOUNTER — HOME INFUSION (PRE-WILLOW HOME INFUSION) (OUTPATIENT)
Dept: PHARMACY | Facility: CLINIC | Age: 56
End: 2021-12-30
Payer: COMMERCIAL

## 2021-12-30 ENCOUNTER — TELEPHONE (OUTPATIENT)
Dept: TRANSPLANT | Facility: CLINIC | Age: 56
End: 2021-12-30
Payer: COMMERCIAL

## 2021-12-30 ENCOUNTER — TELEPHONE (OUTPATIENT)
Dept: PULMONOLOGY | Facility: CLINIC | Age: 56
End: 2021-12-30
Payer: COMMERCIAL

## 2021-12-30 VITALS
TEMPERATURE: 97.4 F | WEIGHT: 146.8 LBS | BODY MASS INDEX: 24.46 KG/M2 | HEART RATE: 94 BPM | SYSTOLIC BLOOD PRESSURE: 140 MMHG | HEIGHT: 65 IN | RESPIRATION RATE: 20 BRPM | DIASTOLIC BLOOD PRESSURE: 92 MMHG | OXYGEN SATURATION: 95 %

## 2021-12-30 DIAGNOSIS — Z94.2 S/P LUNG TRANSPLANT (H): Chronic | ICD-10-CM

## 2021-12-30 DIAGNOSIS — I48.91 ATRIAL FIBRILLATION, UNSPECIFIED TYPE (H): Primary | ICD-10-CM

## 2021-12-30 LAB
ANION GAP SERPL CALCULATED.3IONS-SCNC: 7 MMOL/L (ref 3–14)
BUN SERPL-MCNC: 24 MG/DL (ref 7–30)
CALCIUM SERPL-MCNC: 9.7 MG/DL (ref 8.5–10.1)
CHLORIDE BLD-SCNC: 105 MMOL/L (ref 94–109)
CO2 SERPL-SCNC: 27 MMOL/L (ref 20–32)
CREAT SERPL-MCNC: 1 MG/DL (ref 0.66–1.25)
ERYTHROCYTE [DISTWIDTH] IN BLOOD BY AUTOMATED COUNT: 14 % (ref 10–15)
GFR SERPL CREATININE-BSD FRML MDRD: 88 ML/MIN/1.73M2
GLUCOSE BLD-MCNC: 100 MG/DL (ref 70–99)
GLUCOSE BLDC GLUCOMTR-MCNC: 100 MG/DL (ref 70–99)
HCT VFR BLD AUTO: 36.4 % (ref 40–53)
HGB BLD-MCNC: 11.5 G/DL (ref 13.3–17.7)
IGG SERPL-MCNC: 446 MG/DL (ref 610–1616)
INR PPP: 2.79 (ref 0.86–1.14)
MAGNESIUM SERPL-MCNC: 1.5 MG/DL (ref 1.6–2.3)
MCH RBC QN AUTO: 27.2 PG (ref 26.5–33)
MCHC RBC AUTO-ENTMCNC: 31.6 G/DL (ref 31.5–36.5)
MCV RBC AUTO: 86 FL (ref 78–100)
PERFORMING LABORATORY: NORMAL
PLATELET # BLD AUTO: 197 10E3/UL (ref 150–450)
POTASSIUM BLD-SCNC: 4.6 MMOL/L (ref 3.4–5.3)
RBC # BLD AUTO: 4.23 10E6/UL (ref 4.4–5.9)
SCANNED LAB RESULT: NORMAL
SODIUM SERPL-SCNC: 139 MMOL/L (ref 133–144)
TACROLIMUS BLD-MCNC: 14.1 UG/L (ref 5–15)
TEST NAME: NORMAL
TME LAST DOSE: NORMAL H
TME LAST DOSE: NORMAL H
WBC # BLD AUTO: 11.1 10E3/UL (ref 4–11)

## 2021-12-30 PROCEDURE — 83735 ASSAY OF MAGNESIUM: CPT | Performed by: INTERNAL MEDICINE

## 2021-12-30 PROCEDURE — 250N000013 HC RX MED GY IP 250 OP 250 PS 637: Performed by: PHYSICAL MEDICINE & REHABILITATION

## 2021-12-30 PROCEDURE — 97129 THER IVNTJ 1ST 15 MIN: CPT | Mod: GN

## 2021-12-30 PROCEDURE — 80197 ASSAY OF TACROLIMUS: CPT

## 2021-12-30 PROCEDURE — 80048 BASIC METABOLIC PNL TOTAL CA: CPT | Performed by: INTERNAL MEDICINE

## 2021-12-30 PROCEDURE — 82784 ASSAY IGA/IGD/IGG/IGM EACH: CPT | Performed by: PHYSICAL MEDICINE & REHABILITATION

## 2021-12-30 PROCEDURE — 250N000011 HC RX IP 250 OP 636: Performed by: PHYSICAL MEDICINE & REHABILITATION

## 2021-12-30 PROCEDURE — 36592 COLLECT BLOOD FROM PICC: CPT | Performed by: INTERNAL MEDICINE

## 2021-12-30 PROCEDURE — 99239 HOSP IP/OBS DSCHRG MGMT >30: CPT | Mod: 24 | Performed by: PHYSICAL MEDICINE & REHABILITATION

## 2021-12-30 PROCEDURE — 250N000012 HC RX MED GY IP 250 OP 636 PS 637: Performed by: PHYSICIAN ASSISTANT

## 2021-12-30 PROCEDURE — 250N000012 HC RX MED GY IP 250 OP 636 PS 637: Performed by: INTERNAL MEDICINE

## 2021-12-30 PROCEDURE — 97130 THER IVNTJ EA ADDL 15 MIN: CPT | Mod: GN

## 2021-12-30 PROCEDURE — 85610 PROTHROMBIN TIME: CPT | Performed by: PHYSICAL MEDICINE & REHABILITATION

## 2021-12-30 PROCEDURE — 250N000013 HC RX MED GY IP 250 OP 250 PS 637: Performed by: PHYSICIAN ASSISTANT

## 2021-12-30 PROCEDURE — 250N000011 HC RX IP 250 OP 636: Performed by: PHYSICIAN ASSISTANT

## 2021-12-30 PROCEDURE — 85027 COMPLETE CBC AUTOMATED: CPT | Performed by: INTERNAL MEDICINE

## 2021-12-30 PROCEDURE — 250N000013 HC RX MED GY IP 250 OP 250 PS 637: Performed by: INTERNAL MEDICINE

## 2021-12-30 RX ORDER — TACROLIMUS 1 MG/1
CAPSULE ORAL
Qty: 150 CAPSULE | Refills: 0 | COMMUNITY
Start: 2021-12-30 | End: 2022-01-07

## 2021-12-30 RX ORDER — WARFARIN SODIUM 2 MG/1
2 TABLET ORAL
Status: DISCONTINUED | OUTPATIENT
Start: 2021-12-30 | End: 2021-12-30 | Stop reason: HOSPADM

## 2021-12-30 RX ADMIN — ROSUVASTATIN CALCIUM 10 MG: 10 TABLET, FILM COATED ORAL at 08:10

## 2021-12-30 RX ADMIN — FLUTICASONE PROPIONATE 1 SPRAY: 50 SPRAY, METERED NASAL at 08:23

## 2021-12-30 RX ADMIN — LEVOTHYROXINE SODIUM 25 MCG: 25 TABLET ORAL at 08:11

## 2021-12-30 RX ADMIN — AMLODIPINE BESYLATE 5 MG: 5 TABLET ORAL at 08:11

## 2021-12-30 RX ADMIN — FLUCONAZOLE, SODIUM CHLORIDE 400 MG: 2 INJECTION INTRAVENOUS at 08:03

## 2021-12-30 RX ADMIN — MYCOPHENOLATE MOFETIL 1000 MG: 250 CAPSULE ORAL at 08:22

## 2021-12-30 RX ADMIN — FUROSEMIDE 40 MG: 40 TABLET ORAL at 08:11

## 2021-12-30 RX ADMIN — ESCITALOPRAM OXALATE 20 MG: 20 TABLET ORAL at 08:13

## 2021-12-30 RX ADMIN — CALCIUM CARBONATE 600 MG (1,500 MG)-VITAMIN D3 400 UNIT TABLET 1 TABLET: at 08:12

## 2021-12-30 RX ADMIN — NYSTATIN 1000000 UNITS: 100000 SUSPENSION ORAL at 08:10

## 2021-12-30 RX ADMIN — Medication 15 ML: at 10:27

## 2021-12-30 RX ADMIN — Medication 5 ML: at 07:25

## 2021-12-30 RX ADMIN — PREDNISONE 10 MG: 10 TABLET ORAL at 08:12

## 2021-12-30 RX ADMIN — NYSTATIN 1000000 UNITS: 100000 SUSPENSION ORAL at 11:20

## 2021-12-30 RX ADMIN — Medication 1 TABLET: at 08:11

## 2021-12-30 RX ADMIN — PANTOPRAZOLE SODIUM 40 MG: 40 TABLET, DELAYED RELEASE ORAL at 08:11

## 2021-12-30 RX ADMIN — TACROLIMUS 2 MG: 1 CAPSULE ORAL at 08:10

## 2021-12-30 RX ADMIN — Medication 800 MG: at 11:20

## 2021-12-30 RX ADMIN — MULTIPLE VITAMINS W/ MINERALS TAB 1 TABLET: TAB at 08:12

## 2021-12-30 RX ADMIN — SULFAMETHOXAZOLE AND TRIMETHOPRIM 1 TABLET: 400; 80 TABLET ORAL at 08:11

## 2021-12-30 ASSESSMENT — ACTIVITIES OF DAILY LIVING (ADL)
ADLS_ACUITY_SCORE: 23

## 2021-12-30 NOTE — RESULT ENCOUNTER NOTE
Tacrolimus level 14.1 at 12 hours, on 12/30/2021.  Goal 8-12.   Current dose 2 mg in AM, 2 mg in PM    Dose changed to 2 mg in AM, 1 mg in PM   Recheck level in 3-5    See phone encounter from Dr. Woods.

## 2021-12-30 NOTE — PLAN OF CARE
FOCUS/GOAL  Medication management, Skin integrity, and Carryover of rehab techniques    ASSESSMENT, INTERVENTIONS AND CONTINUING PLAN FOR GOAL:  Pt was A/O, able to use call light and make needs known. Denies pain, SOB, chest pain, N/V, nor dizziness. VSS. CGA walker with transfers. On regular diet, thin liquids, takes medicines. Cont of BL, no BM thi shift, LBM-12/29/21. PICC line in place, blood return noted, dressing CDI. Old trach site, KIM, old PEG tube site dressing CDI. BG monitored. On MAP, called on time and identified meds correctly. CHG aminta wipe completed. For discharge tomorrow, home meds delivered, endorsed to NOC nurse. Will continue with current POC.

## 2021-12-30 NOTE — DISCHARGE SUMMARY
[facility] Unit  Discharge summary     Date of Admission: 12/13/2021  Date of Discharge: 12/30/2021  Disposition: [facility] with  services  Primary Care Physician: Lia Spicer  Attending physician: Johnny Coronado MD  Other significant physician provider(s): Mo Austin PGY-2, Susan San PA, hospitalist team, lung transplant team      DISCHARGE DIAGNOSIS      Embolic CVA    ILD s/p b/l lung transplants, SALTY, HTN, HLD, hypogammaglobulinemia, anxiety, depression, dyspnea, respiratory failure s/p trach (now decannulated) pulmonary effusion, DVT, GI bleed, encephalopathy, tremors, a. 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  Postoperative course was initially complicated by prolonged vent wean s/p trach and PEG placement with CT surgery on 10/29.  Patient developed significant encephalopathy and diffuse bilateral upper extremity weakness, found to have multifocal acute to subacute CVA of bilateral frontal and occipital lobes, thought to be embolic versus perioperative in nature.  Hospitalization was further complicated by new Afib with RVR, BRENNAN, candidemia with Candida empyema currently on IV fluconazole, hospital-acquired Klebsiella/Pseudomonas pneumonia currently on IV ceftazidime CODE BLUE associated with bradycardia/asystole and significant GI bleed requiring massive transfusion protocol, s/p EGD and clipping with adherent clot near PEG site.  Patient was transferred from ICU to medicine on 11/23/2021 and  "decannulated 12/8/2021.  During hospitalization, rheumatology, CT surgery, cardiology, palliative care, dental, ID, pulmonology, neurology, GI, ophthalmology, ENT, nephrology, endocrine, psychiatry all consulted.      REHABILITATION COURSE  Edson Thornton was admitted to the [facility] acute inpatient rehabilitation unit on 12/13/21 where he participated in PT, OT, and SLP for 3 hrs/day.  During his stay his PEG tube was successfully removed after ensuring adequate PO intake and required downtitration of his diabetic medications.  A follow up CT chest demonstrated decreased but persistent LLL cavitary lesion and b/l loculated effusions, and thus his course of antibiotics/anti-fungals were extended per ID.  He will follow up with them in January with another CT chest prior.  Functionally he made excellent progress, and at the time of discharge his functional status was as follows:    PT:  Continued therapy is recommended.  Rationale/Recommendations:  Pt is still significantly deconditioned and has dyspnea with small amounts of exertion. Recommending ongoing HCPT and eventual progression to OP Pulmonary Rehab to maximize CP function and return to PLOF..     Discharge Plan: [facility] with jarret, HCPT[facility]     Precautions: Falls, dyspnea on exertion     Current Status:  Bed Mobility: IND  Transfer: MOD I with 4WW  Gait: MOD I 175 ft with 4WW  Stairs: 4 stairs B rails, MOD I  Balance: Runs into objects on R side, 50% of the time. Able to self correct.      30 seconds STS from 20\" mat using hands on FWW 12/19/21 = 6, OMNI 3/10                      STS from 20\" mat 12/27 = 8x with hands on  Thighs , OMNI 5/10  TUG 12/19/21 = 20 seconds, OMNI 7/10           12/27/21 = 15.8 seconds, OMNI 2/10     4WW - arrriving tomorrow  Family training for completed    OT:  Continued therapy is recommended.  Rationale/Recommendations:  Pt has a low activity tolerance and endurance. He is deconditioned and " requires many rest breaks due to dyspnea. Recommending HCOT to return to PLOF and have increased independence with ADLs/IADLs. .    SLP:  Communication: JUNIE.   Cognition: Mild  cognitive linguistic impairments with deficits re: attention, exeuctive function, memory, and visuospatial skills, word finding.  Swallow: Regular, thin liquid. Medications to be given orally with thin liquids. Will continue to monitor.      Assessment:  Pt observed to completing MAP program with medication with 100% accuracy. Demonstrating understanding of  medication and when to take them. Discussed with pt plan to continue implement upon dc and consider using  larger med box to store all meds needed. Reviewed recommendations for discharge. Discharge note completed.       MEDICAL COURSE  Neuro/Psych  #Multifocal acute to subacute ischemic stroke, etiology likely embolic vs perioperative  Initially noted 10/22 and 11/6 with stroke code called for change in exam.  MRI brain 10/23 with infarcts noted in both cerebral hemispheres (R frontal, L corona radiata, bilateral occipital), left cerebellum, presumed associated with new Afib vs perioperative. Bilateral upper extremity paresis (R>L), suspicion for some cortical blindness  - Stroke risk factor management:              -Warfarin anticoagulation for Afib, appreciate pharmacy assistance with dosing              -BP goals: SBP<140              -continue Rosuvastatin 10 mg qday              -Not smoking              -Hgb A1c: 6.6%        #Encephalopathy  #Sleep  -Delirium precautions  -Now completed Seroquel weaning (12/22)  -Melatonin 10mg qhs  -Trazodone 25mg prn     #Mood  #History of anxiety and depression  - PTA Lexapro increased to 10 mg while in acute hospital, increased to 20mg 12/14,   -hyrdoxyzine discontinued due to lack of use         Pulmonary  #ILD, NISP, s/p bilateral lung transplant (10/16/2021)  #Acute on chronic respiratory failure s/p tracheostomy (10/29/21) and decannulation  (12/8/21)  #Bilateral Pleural Effusions  -Sternal precautions until 12 wks post op (1/8/2022)  -Immunosuppression:              -Continue tacrolimus  goal 8-12              -Continue MMF 1000mg BID              -Continue prednisone - Taper plan per pulmonary.  Currently on 10 mg 10 mg BID (next taper 1/2/22)  -Monitoring lab/imaging              -DSA q2wks              -Coccidioides Ag per transplant completed 12/17 - Per ID, no further need to check monthly              -CMV, EBV per transplant team              -CXR, CBC, BMP, Tac level M/Th  -Per pulmonary upon discharge have discontinued Levalbuterol and Mucomyst, and have started Xopenex MDI PRN  -Pulmonary toilet, encourage IS  -Continue Lasix 40mg qday     #SALTY  -Trach site now healed.  IM discussed with pulmonology, no indication for CPAP or BiPAP at this time. Consider outpatient sleep study if indicated.     ID  #Immunosuppression Prophylaxsis   -Bactrim x6 months  -Nystatin x6 months     #L lung cavitary lesion  #Hospital-acquired pneumonia with Klebsiella pneumoniae, Pseudomonas fluorescens/putida  Suspected aspiration versus pseudomonal vs Klebsiella pneumonia related to abscess.  CT  11/22 showing new cavitary lesion in left lung base, multifocal bilateral upper lobe groundglass opacity, and new 1.8 cm fluid collection with surrounding fat stranding in left axilla, decreased bilateral loculated pleural effusions.  QuantiFERON gold indeterminate, negative tuberculin skin test on multiple occurrences.  Underwent BAL on 11/22 with cultures growing Klebsiella and Pseudomonas fluorescens/putida, resistant to meropenem.  ID was consulted and feels this is likely fungal.  B-D glucan positive with known candidemia.  Cocci antigen in urine negative, serum histoplasmosis negative, CRAG negative  -Follow-up chest completed CT 12/20   1. Overall decreased (since 11/22/2021 chest CT) size and number of bilateral infectious/inflammatory groundglass opacities. 2.  Decreased but persistent left lower lobe cavitary lesion.  -Followed up with Dr. Abebe 12/21, recs appreciated              -Continue fluconazole for two additional weeks (until 1/18/2022) to complete 8 weeks of therapy for Genevieve empyema.               -Continue ceftazidime for four additional weeks until 1/18/2022 however, when ready for discharge he may be discharged on PO levaquin 500 mg daily.               -Repeat CT chest without contrast 1/18/2022 before his appointment with Dr. Abebe (1/18/22 at 6:00pm via virtual visit)              -No need to check monthly Coccidioides AG.      #Invasive Candida albicans candidiasis  Positive Candida blood cultures 10/20, 10/22.  Fungitell positive (399).  TC 10/23 without evidence of endocarditis. Ophthalmic consulted 10/24, benign funduscopic exam.  Candida empyema 10/25.  Repeated pleural effusion fungal cultures and fungal/bacterial blood cultures have been without growth since 11/18.  Initially on micafungin (10/22-10/27) before transition to fluconazole IV  -Continue fluconazole for two additional weeks (until 1/18/2022) to complete 8 weeks of therapy for Genevieve empyema per ID follow up recs 12/21     #hx of HSV  Chronic intermittent infection pretransplant, with recent HSV infection while in acute hospital (crusted lesions along left jaw) s/p 10d treatment completed 10/9     Cardiovascular  #Afib  Initially noted 10/18, converted to NSR with amio ggt. Metoprolol and amiodarone discontinued due to bradycardia.  -Warfarin as dosed by pharmacy.  Will discharge on Coumadin 2 mg daily with further adjustments per INR clinic  -will complete anticoagulation class prior to discharge      #Hypertension  -continue PTA amlodipine 5 mg daily  -also on lasix 40 mg daily      Heme  #Leukocytosis  Previously 13.2 on 12/14, then decreased but mildly elevated at 11.1 on 12/30, stable.   No signs of infection at this time.  Repeat as outpatient.      #R subclavian DVT  -Warfarin  anticoagulation, pharmacy to dose     #Hypogammaglobulinemia  S/p IVIG  -IgG 12/15 365  -Repeat 12/30 446     Endocrine  #Type 2 diabetes  #Steroid-induced hyperglycemia  -Endocrine consulted while in acute hospital.  Tube feeds on hold starting 12/20 and PEG/J tube now removed.    - Decrease Lantus to 7 units daily with lower glucose values.  May be able to discontinue in the future, especially as steroids decrease,   - discontinue sliding scale insulin  - titrate as indicated- anticipate decrease/dc long acting insulin with steroid taper.      #Hypothyroidism  TSH 3.17 on 11/19  -Continue levothyroxine 25 mcg daily     Renal  #BRENNAN, resolved  Cr 0.89 12/27, stable  -Monitor intermittently     MSK/Rheum  #Rheumatoid arthritis  Previously treated with rituximab.  Rheumatology familiar and consulted early in admission.  -Steroids as above     GI/FEN  #Severe malnutrition in the context of acute on chronic illness or injury and evidenced by >7.5% weight loss in the past 3 months   -PEG/J tube removed 12/24 at bedside with improved PO intake.  -Continue multivitamin, folate, B6, B12 supplements     #hx of GI Bleed  Patient with progressive hypotension and CODE BLUE for bradycardia/asystole associated with GI bleed in the setting of anticoagulation, requiring massive transfusion protocol on 10/22.  EGD on 11/2 with clotted blood in stomach, adherent clot near PEG site that was clipped without active bleeding.  Most recent EGD 11/5 with ulcer noted near the PEG bumper site, gastritis, suction marks from the G-tube.  GJ tube was exchanged 11/9 by GI. Hgb improved to 115 on 12/30  -Continue PPI BID      Bowel/Bladder  Bowel, continent  Constipation  - Senna-docusate 1-2 tablets BID  - Miralax QDAY PRN  - Bisacodyl suppository 10mg QDAY PRN     Bladder, continent     Discharge location:  Local [facility] - [facility]  Follow up Appointments on Discharge:                -Neurology 1-2 months after  discharge               -Outpatient cardiac/pulmonary Rehab               -Transplant Coordinator to arrange transplant related follow ups               -ID 1/18 with repeat CT chest  Prior    DISCHARGE MEDICATIONS  Current Discharge Medication List      START taking these medications    Details   levalbuterol (XOPENEX HFA) 45 MCG/ACT inhaler Inhale 2 puffs into the lungs every 4 hours as needed for wheezing or shortness of breath / dyspnea  Qty: 15 g, Refills: 0    Associated Diagnoses: S/P lung transplant (H)      levofloxacin (LEVAQUIN) 500 MG tablet Take 1 tablet (500 mg) by mouth daily  Qty: 30 tablet, Refills: 0    Associated Diagnoses: Abscess of lower lobe of left lung with pneumonia (H)      traZODone (DESYREL) 50 MG tablet Take 0.5 tablets (25 mg) by mouth nightly as needed for sleep  Qty: 60 tablet, Refills: 0    Associated Diagnoses: Insomnia due to medical condition         CONTINUE these medications which have CHANGED    Details   amLODIPine (NORVASC) 5 MG tablet Take 1 tablet (5 mg) by mouth daily  Qty: 30 tablet, Refills: 0    Associated Diagnoses: Benign essential hypertension      calcium carbonate 600 mg-vitamin D 400 units (CALTRATE) 600-400 MG-UNIT per tablet 1 tablet by Oral or Feeding Tube route 2 times daily (with meals)  Qty: 60 tablet, Refills: 0    Associated Diagnoses: S/P lung transplant (H)      escitalopram (LEXAPRO) 20 MG tablet Take 1 tablet (20 mg) by mouth daily  Qty: 30 tablet, Refills: 0    Associated Diagnoses: S/P lung transplant (H)      fluconazole (DIFLUCAN) 400-0.9 MG/200ML-% infusion Inject 200 mLs (400 mg) into the vein every 24 hours for 18 days  Qty: 3600 mL, Refills: 0    Associated Diagnoses: S/P lung transplant (H)      fluticasone (FLONASE) 50 MCG/ACT nasal spray Spray 1 spray into both nostrils daily  Qty: 16 g, Refills: 0    Associated Diagnoses: S/P lung transplant (H)      folic acid-vit B6-vit B12 (FOLGARD) 0.8-10-0.115 MG TABS per tablet Take 1 tablet by mouth  daily  Qty: 30 tablet, Refills: 0    Associated Diagnoses: S/P lung transplant (H)      furosemide (LASIX) 40 MG tablet Take 1 tablet (40 mg) by mouth daily  Qty: 30 tablet, Refills: 0    Associated Diagnoses: S/P lung transplant (H)      hydrOXYzine (ATARAX) 25 MG tablet Take 1 tablet (25 mg) by mouth 3 times daily as needed for itching or anxiety  Qty: 90 tablet, Refills: 0    Associated Diagnoses: Adjustment disorder with anxious mood      insulin glargine (LANTUS PEN) 100 UNIT/ML pen Inject 7 Units Subcutaneous every morning  Qty: 15 mL, Refills: 0    Comments: If Lantus is not covered by insurance, may substitute Basaglar at same dose and frequency.    Associated Diagnoses: Type 2 diabetes mellitus with hyperglycemia, with long-term current use of insulin (H)      levothyroxine (SYNTHROID/LEVOTHROID) 25 MCG tablet Take 1 tablet (25 mcg) by mouth daily  Qty: 30 tablet, Refills: 0    Associated Diagnoses: S/P lung transplant (H)      magnesium oxide (MAG-OX) 400 MG tablet Take 2 tablets (800 mg) by mouth 2 times daily  Qty: 120 tablet, Refills: 0    Associated Diagnoses: S/P lung transplant (H)      multivitamin w/minerals (THERA-VIT-M) tablet Take 1 tablet by mouth daily  Qty: 30 tablet, Refills: 0    Associated Diagnoses: S/P lung transplant (H)      mycophenolate (GENERIC EQUIVALENT) 250 MG capsule Take 4 capsules (1,000 mg) by mouth 2 times daily  Qty: 240 capsule, Refills: 0    Associated Diagnoses: S/P lung transplant (H)      nystatin (MYCOSTATIN) 941403 UNIT/ML suspension Swish and swallow 10 mLs (1,000,000 Units) in mouth 4 times daily  Qty: 1500 mL, Refills: 0    Associated Diagnoses: S/P lung transplant (H)      pantoprazole (PROTONIX) 40 MG EC tablet Take 1 tablet (40 mg) by mouth 2 times daily (before meals)  Qty: 60 tablet, Refills: 0    Associated Diagnoses: S/P lung transplant (H)      predniSONE (DELTASONE) 5 MG tablet Take 10 mg (2 tabs) by mouth twice daily through 1/1/22.  Then on 1/2/21 take  10 mg in am and 7.5 mg (1 and a half tabs) in afternoon.  Further taper as outlined by transplant team.  Qty: 150 tablet, Refills: 0    Associated Diagnoses: S/P lung transplant (H)      rosuvastatin (CRESTOR) 10 MG tablet Take 1 tablet (10 mg) by mouth daily  Qty: 30 tablet, Refills: 0    Associated Diagnoses: S/P lung transplant (H)      sulfamethoxazole-trimethoprim (BACTRIM) 400-80 MG tablet Take 1 tablet by mouth daily  Qty: 30 tablet, Refills: 0    Associated Diagnoses: S/P lung transplant (H)      tacrolimus (GENERIC EQUIVALENT) 1 MG capsule Take 2 capsules (2 mg) by mouth 2 times daily Dose titration per transplant team.  Qty: 150 capsule, Refills: 0    Associated Diagnoses: S/P lung transplant (H)      !! warfarin ANTICOAGULANT (COUMADIN) 1 MG tablet Take 2 tablets (2 mg) by mouth daily  Qty: 90 tablet, Refills: 0    Associated Diagnoses: S/P lung transplant (H)      !! warfarin ANTICOAGULANT (COUMADIN) 1 MG tablet Take 2 tablets (2 mg) by mouth every evening Further dosing per anticoagulation clinic  Qty: 90 tablet, Refills: 0    Associated Diagnoses: Atrial fibrillation, unspecified type (H)       !! - Potential duplicate medications found. Please discuss with provider.      CONTINUE these medications which have NOT CHANGED    Details   acetaminophen (TYLENOL) 325 MG tablet 3 tablets (975 mg) by Oral or Feeding Tube route every 8 hours    Associated Diagnoses: S/P lung transplant (H)      !! melatonin 10 MG TABS tablet Take 1 tablet (10 mg) by mouth nightly as needed for sleep  Qty: 30 tablet, Refills: 0    Associated Diagnoses: Insomnia due to medical condition      !! melatonin 10 MG TABS tablet Take 1 tablet (10 mg) by mouth every evening    Associated Diagnoses: S/P lung transplant (H)       !! - Potential duplicate medications found. Please discuss with provider.      STOP taking these medications       acetylcysteine (MUCOMYST) 20 % neb solution Comments:   Reason for Stopping:         albuterol  (PROVENTIL) (2.5 MG/3ML) 0.083% neb solution Comments:   Reason for Stopping:         bisacodyl (DULCOLAX) 10 MG suppository Comments:   Reason for Stopping:         cefTAZidime (FORTAZ) 2 g vial Comments:   Reason for Stopping:         cyclobenzaprine (FLEXERIL) 10 MG tablet Comments:   Reason for Stopping:         fluticasone-vilanterol (BREO ELLIPTA) 200-25 MCG/INH inhaler Comments:   Reason for Stopping:         Guar Gum (FIBER MODULAR, NUTRISOURCE FIBER,) packet Comments:   Reason for Stopping:         insulin aspart (NOVOLOG PEN) 100 UNIT/ML pen Comments:   Reason for Stopping:         levalbuterol (XOPENEX) 1.25 MG/3ML neb solution Comments:   Reason for Stopping:         Lidocaine (LIDOCARE) 4 % Patch Comments:   Reason for Stopping:         lidocaine, viscous, (XYLOCAINE) 2 % solution Comments:   Reason for Stopping:         loperamide (IMODIUM) 2 MG capsule Comments:   Reason for Stopping:         magnesium hydroxide (MILK OF MAGNESIA) 400 MG/5ML suspension Comments:   Reason for Stopping:         menthol (ICY HOT) 5 % PTCH Comments:   Reason for Stopping:         ondansetron (ZOFRAN-ODT) 4 MG ODT tab Comments:   Reason for Stopping:         oxyCODONE (ROXICODONE) 5 MG tablet Comments:   Reason for Stopping:         oxymetazoline (AFRIN) 0.05 % nasal spray Comments:   Reason for Stopping:         prochlorperazine (COMPAZINE) 10 MG tablet Comments:   Reason for Stopping:         QUEtiapine (SEROQUEL) 25 MG tablet Comments:   Reason for Stopping:         QUEtiapine (SEROQUEL) 25 MG tablet Comments:   Reason for Stopping:         QUEtiapine (SEROQUEL) 50 MG tablet Comments:   Reason for Stopping:         senna-docusate (SENOKOT-S/PERICOLACE) 8.6-50 MG tablet Comments:   Reason for Stopping:         tacrolimus (GENERIC EQUIVALENT) 0.5 MG capsule Comments:   Reason for Stopping:         zinc-white petrolatum (ILEX) 58.3 % external paste Comments:   Reason for Stopping:                 DISCHARGE INSTRUCTIONS AND  FOLLOW UP  Discharge Procedure Orders   Home care nursing referral   Referral Priority: Routine: Next available opening Referral Type: Home Health Therapies & Aides   Number of Visits Requested: 1     Home infusion referral   Referral Priority: Routine: Next available opening Referral Type: Consultation   Number of Visits Requested: 1     Home Care PT Referral for Hospital Discharge   Referral Priority: Routine: Next available opening Referral Type: Home Health Therapies & Aides   Number of Visits Requested: 1     Home Care OT Referral for Hospital Discharge   Referral Priority: Routine: Next available opening Referral Type: Consultation   Number of Visits Requested: 1     Anticoagulation Clinic Referral   Referred to Provider: VANNESA JIMÉNEZ     Reason for your hospital stay   Order Comments: Admitted for rehabilitation following prolonged hospitalization in setting of lung transplant complicated by stroke, lung infection.     Activity   Order Comments: Your activity upon discharge: activity as tolerated, assist from Peg/ therapy as recommended.     Order Specific Question Answer Comments   Is discharge order? Yes      Follow Up and recommended labs and tests   Order Comments: Twice weekly, CBC, BMP, Mag, Phos, Tacrolimus level per transplant team recs.   Follow up CT lungs (high resolution without contrast) prior to 1/18/21 appointment.     Monitor and record   Order Comments: blood glucose 2-3 times a day, before meals and at bedtime and as needed if symptomatic  weight every day     MD face to face encounter   Order Comments: Documentation of Face to Face and Certification for Home Health Services    I certify that patient: Edson Thornton is under my care and that I, or a nurse practitioner or physician's assistant working with me, had a face-to-face encounter that meets the physician face-to-face encounter requirements with this patient on: 12/28/2021.    This encounter with the patient was in whole,  or in part, for the following medical condition, which is the primary reason for home health care: impaired strength/ activity tolerance.    I certify that, based on my findings, the following services are medically necessary home health services: Nursing, Occupational Therapy, and Physical Therapy.    My clinical findings support the need for the above services because: Nurse is needed: To assess home safety/medication management after changes in medications or other medical regimen., Occupational Therapy Services are needed to assess and treat cognitive ability and address ADL safety due to impairment in activity tolerance., and Physical Therapy Services are needed to assess and treat the following functional impairments: strength/activity tolerance.    Further, I certify that my clinical findings support that this patient is homebound (i.e. absences from home require considerable and taxing effort and are for medical reasons or [employer] services or infrequently or of short duration when for other reasons) because: Leaving home is medically contraindicated for the following reason(s): Infection risk / immunocompromised state where it is safer for them to receive services in the home...    Based on the above findings. I certify that this patient is confined to the home and needs intermittent skilled nursing care, physical therapy and/or speech therapy.  The patient is under my care, and I have initiated the establishment of the plan of care.  This patient will be followed by a physician who will periodically review the plan of care.  Physician/Provider to provide follow up care: Lia Spicer    Attending hospital physician (the Medicare certified [facility] provider): Johnny Coronado MD  Physician Signature: See electronic signature associated with these discharge orders.  Date: 12/28/2021     IV access   Order Comments: **Ordering Provider MUST call/page Care Coordinator/ to discuss arranging  this service**    You are going home with the following vascular access device: PICC.     Adult [facility] Follow-up and recommended labs and tests   Order Comments: Follow up pulmonary transplant as scheduled  Follow up transplant ID with imaging prior as scheduled 1/18/21  Follow up stroke neurology.    Appointments on [place] and/or [facility] (with [facility] or [facility] provider or service). Call [phone] if you haven't heard regarding these appointments within 7 days of discharge.     Diet   Order Comments: Follow this diet upon discharge:       Regular Diet Adult Thin Liquids (level 0)     Order Specific Question Answer Comments   Is discharge order? Yes           PHYSICAL EXAMINATION    Most recent Vital Signs:   Vitals:    12/28/21 2212 12/29/21 0613 12/29/21 1607 12/30/21 0801   BP: 127/80 138/83 135/80 (!) 140/92   BP Location: Right arm Right arm Right arm Right arm   Pulse:  86 98 94   Resp:  20 20 20   Temp:  (!) 96.7  F (35.9  C) (!) 96.2  F (35.7  C) 97.4  F (36.3  C)   TempSrc:  Oral Oral Oral   SpO2:  97% 95% 95%   Weight:  66.6 kg (146 lb 12.8 oz)     Height:             Gen: Awake, coooperative NAD  HEENT: Trach site now healed, mmm  Cardio: RRR, S1+S2, no m/r/g  Pulm: Non-labored breathing at rest, with lungs clear diminished at bases.  Abd: Soft, non-tender to palpation, bowel sounds present. Dressing over prior PEG tube site  Ext: No edema in lower or upper extremities b/l, no calf tenderness  Neuro/MSK: alert, oriented, answers questions appropriately, follows commands. Tremor present. MMT 5/5 to b/l UEs and LEs except 4/5 to R triceps and 4/5 to b/l hip flexors.          45 minutes spent in discharge, including >50% in counseling and coordination of care, medication review and plan of care recommended on follow up.     Discharge summary was forwarded to Lia Spicer (PCP) at the time of discharge, so as to bridge from hospital to outpatient care.     It was our pleasure to care for  Edson Thornton during this hospitalization. Please do not hesitate to contact me should there be questions regarding the hospital course or discharge plan.      Lan Coronado MD  Department of Rehabilitation Medicine

## 2021-12-30 NOTE — CONSULTS
Met with patient and SO for warfarin education. Reviewed reason for taking warfarin, how it works, importance of compliance and taking it as prescribed.     Explained INR lab and how it measures warfarin levels and the goal INR level. Educated on s/sx of clotting and bleeding to watch for.    Discussed diet modifications and how vitamin K interacts with warfarin, medications and herbal supplements to avoid that interfere with warfarin, avoiding alcohol and tobacco.     Educated on lifestyle changes (traveling, exercise/activity considerations, planning for surgery, avoiding injury/bleeding).     Patient and SO asked appropriate questions and verbalized understanding.    Literature given: Guide to Warfarin Therapy, Warfarin Therapy Calendar.

## 2021-12-30 NOTE — TELEPHONE ENCOUNTER
The patient was discharged from the rehabilitation unit this morning.  Tacrolimus level is elevated at 14.1.  It appears the patient only has 1 mg tablets.  Dose will be reduced to 2 mg in the morning and 1 mg in the evening.  A level will be checked with clinic follow-up early next week.  (Discussed by phone with the patient.)  Abiodun Woods MD

## 2021-12-30 NOTE — PROGRESS NOTES
[facility]  Nurse Liaison    PT discharging today, reviewed delivery process and delivery 3-6p to the [facility].  They attended the PLC this am, and Peg, his SO, states it went well. Encouraged to listen for phone/VM form the [place] agency regarding upcoming SNV tomorrow.     Dianne [facility]-Nurse Liaison  Cell:  [phone] Mon-Fri  [email or web address]  [facility]-24 hrs  [phone]

## 2021-12-30 NOTE — TELEPHONE ENCOUNTER
RECORDS RECEIVED FROM: Internal   REASON FOR VISIT: hosp f/u   Date of Appt: 3/30/22   NOTES (FOR ALL VISITS) STATUS DETAILS   OFFICE NOTE from referring provider Internal SEE INPATIENT NOTES   OFFICE NOTE from other specialist N/A    DISCHARGE SUMMARY from hospital Internal [facility] Acute Rehab:  12/13/21-12/30/21    [facility]:  9/5/21-12/13/21   DISCHARGE REPORT from the ER N/A    OPERATIVE REPORT N/A    MEDICATION LIST Internal    IMAGING  (FOR ALL VISITS)     EMG N/A    EEG N/A    LUMBAR PUNCTURE N/A    DANNY SCAN N/A    ULTRASOUND (CAROTID BILAT) *VASCULAR* N/A    MRI (HEAD, NECK, SPINE) Internal [facility]:  MRI Brain 10/26/21  MRI Brain 10/23/21   CT (HEAD, NECK, SPINE) [facility]:  CT Head 11/5/21  CT Head 11/2/21  CTA Head Neck 10/25/21  CT Head 10/25/21  CTA Head Neck 10/22/21  CT Head Perfusion 10/22/21  CT Head 10/22/21

## 2021-12-30 NOTE — PLAN OF CARE
"  VS: /80 (BP Location: Right arm)   Pulse 98   Temp (!) 96.2  F (35.7  C) (Oral)   Resp 20   Ht 1.646 m (5' 4.8\")   Wt 66.6 kg (146 lb 12.8 oz)   SpO2 95%   BMI 24.58 kg/m     O2: Room air. Pt denied SOB   Output: Continent    Last BM: 12/29/21. Continent   Activity: A-1 with walker   Skin/Dressing: Old trach site - Open to air.   Old chest tube wounds scabbed over on upper abdomen Open to air.   Old PEG tube bandaged with dry gauze - CDI.  Bruising on lower abdomen.    Pain: Denied pain this shift.    Neuro/CMS: A/Ox4, intact. Pt denied any new chest or abdominal pain, n/v, n/t, or dizziness   Diet: Regular diet. Whole pills   LDA: PICC line LUE triple lumen saline locked.   Plan: Continue POC  Expected discharge 12/30/21   Additional Info: Pt is able to make needs known.       "

## 2021-12-30 NOTE — CONSULTS
Met with patient and SO, Peg at the bedside for PICC education and IV med administration teaching. Discussed basic PICC cares/safety. Patient reports he has severe hand tremors and that Peg will be administering the antibiotic.     Peg correctly taught back prepping syringes, flushing PICC line with saline and heparin after demonstration by this RN.    Taught the SASH method for IV fluconazole administration. Peg correctly administered abx via elastomeric device, using the SASH method.    Patient and SO engaged in education and demonstrated understanding.    Literature given: Handwashing and Skin Care, Caring for Your PICC at Home, Changing the End Cap, Flushing the Line with Heparin, Saline or Citrate, Instructions for IV Medicine Ball.

## 2021-12-30 NOTE — TELEPHONE ENCOUNTER
Patient discharged from rehab today, called to check in.     -Patient states things are going well. Reviewed medications with transplant pharmacist before discharge.   - reviewed numbers to call if any questions arise  - discussed checking VS BID, parameters VS should be in and when to call coordinator.  - instructed patient to write down VS and any questions in vital sign book.   - reviewed how to obtain tacrolimus at 12hr level with lab appointments.     Patient has no questions/concerns at this time. Will call with questions, concerns, updates.

## 2021-12-30 NOTE — TELEPHONE ENCOUNTER
ANTICOAGULATION  MANAGEMENT: Discharge Review    Edson Thornton chart reviewed for anticoagulation continuity of care    Hospital Admission on - for Lung transplant complicated by stroke and lung infection.    Discharge disposition: Home with Home Care    Results:    Recent labs: (last 7 days)     21  0612 21  0643 21  0610 21  0641 21  0727 21  0730   INR 2.48* 2.30* 2.61* 2.71* 2.82* 2.79*     Anticoagulation inpatient management:     See calender    Anticoagulation discharge instructions:     Warfarin dosinmg daily with an INR check on 1/3/22.   Bridging: No   INR goal change: No      Medication changes affecting anticoagulation: Yes: Levaquin, prednisone, Bactrim and tacro    Additional factors affecting anticoagulation: Yes: Patient is recovering from a long surgery.     Plan     Agree with dosing adjustment on discharge    Patient not contacted    Anticoagulation Calendar updated    Terrie Cedillo RN

## 2021-12-30 NOTE — PLAN OF CARE
FOCUS/GOAL  Discharge planning    ASSESSMENT, INTERVENTIONS AND CONTINUING PLAN FOR GOAL:  Pt is alert and oriented x4. Denies pain. VSS. Has dyspnea w/ exertion but O2 oumar >94% on RA w/ activity. Needing SBA and walker for mobility in room. Had bm x2 in toilet, independent w/ perineal hygiene.    before breakfast. Pt/ Peg attended PLC IV infusion/picc care and coumadin class and verbalized understanding of teachings.   AVS and discharge medications were reviewed with pt and Peg.   Picc w/ triple lumens patent w/ good blood returns.   Pt discharged to [facility] at 1130 left the unit via W/C.

## 2021-12-30 NOTE — PLAN OF CARE
Discharge Planner Post-Acute Rehab SLP:      Discharge Plan: [facility] with fiance , further SLP homecare     Precautions: sternal, fall     Current Status:  Communication: WFL.   Cognition: Mild  cognitive linguistic impairments with deficits re: attention, exeuctive function, memory, and visuospatial skills, word finding.  Swallow: Regular, thin liquid. Medications to be given orally with thin liquids. Will continue to monitor.      Assessment:  Pt observed to completing MAP program with medication with 100% accuracy. Demonstrating understanding of  medication and when to take them. Discussed with pt plan to continue implement upon dc and consider using  larger med box to store all meds needed. Reviewed recommendations for discharge. Discharge note completed.        Other Barriers to Discharge (Family Training, etc):

## 2021-12-31 ENCOUNTER — MEDICAL CORRESPONDENCE (OUTPATIENT)
Dept: HEALTH INFORMATION MANAGEMENT | Facility: CLINIC | Age: 56
End: 2021-12-31
Payer: COMMERCIAL

## 2021-12-31 ENCOUNTER — TELEPHONE (OUTPATIENT)
Dept: TRANSPLANT | Facility: CLINIC | Age: 56
End: 2021-12-31
Payer: COMMERCIAL

## 2021-12-31 NOTE — TELEPHONE ENCOUNTER
"Called patient to check in before the weekend.     First night \"was a little rough\", unable to sleep. Patient did not take melatonin or trazadone last night. Patient will take sleep medications tonight. Advised patient to take melatonin a couple hours before bed, and if not able to sleep, than take trazodone (but before midnight).     Patient verbalized understanding and agreement of plan. [facility] message sent with instructions on how to reach on call coordinator over the weekend per patient request.     Reviewed appointments on Monday again.     Patient will call back with questions, concerns ,updates.   "

## 2021-12-31 NOTE — TELEPHONE ENCOUNTER
Bret is a recent lung transplant patient who discharged from [facility] on 12/30/2021.     The patient will be responsible for managing medications.    If patient has medication needs while staying locally he plans to utilize [facility] pharmacy at [address] [place] [address]. When he returns home, patient plans to use local [facility] pharmacy.    Upon discharge, all his medications were $0 except for some OTC products. His insurance plan is set to restart tomorrow.     Patient actually has 2 separate primary insurances. Patient has only been using his [facility] plan and was not aware of pharmacy benefits with his [employer] plan.    PHARMACY BENEFIT: [employer]  PROCESSING INFO: ID#[id number] GRP#[id number] PCN#[id number] BIN#[id number] (EFFECTIVE 1/1/21)   NO DEDUCTIBLE & OUT-OF-POCKET $6850  COPAY STRUCTURE:  $20 FOR TIER 1  %50 COINSURANCE FOR TIER 2  %50 COINSURANCE FOR TIER 3 MEDICATIONS    OTHER PHARMACY BENEFIT: [facility]   PROCESSING INFO: ID#[id number] GRP#[id number] PCN#[id number] BIN#[id number] (EFFECTIVE 1/1/21)   DEDUCTIBLE & OUT-OF-POCKET $6000  COPAY STRUCTURE:  $12 FOR GENERIC  $50 FOR BRAND MEDICATIONS    [employer]CLAIM SPECIALTY #28:   MYCOPHENOLATE 250MG=$20 WITH [employer], $0 WITH [facility]   PROGRAF 1MG=$185.71 WITH [employer],  PA REQUIRED WITH [facility]  TACROLIMUS 1MG=$20 WITH [employer], $0 WITH [facility]  CYCLOSPORINE 100MG=$20 WITH [employer], $0 WITH [facility]  VALGANCICLOVIR 450MG=$20 WITH [employer], $0 WITH [facility]      Brodie Clark Pharm.D.  [facility]  [phone]

## 2022-01-02 NOTE — PROGRESS NOTES
[facility]  January 3, 2022         Assessment and Plan:   Edson Thornton is a 56 year old male with NSIP/ILD, bronchiectasis, moderate PH, RA, SALTY, chronic HSV infection, hypogammaglobulinemia, steroid-induced diabetes, hypothyroidism, PFO, HTN, HLD, duodenal anomaly, anxiety, and depression. Admitted on 9/5/21 from OSH for acute on chronic respiratory failure 2/2 ILD exacerbation now s/p BSLT on 10/16/21. Prolonged vent wean s/p trach (10/29-12/8) and PEG/J tube (10/29-12/24).  Post-op course also complicated by encephalopathy and diffuse weakness, acute to subacute CVA, afib with RVR, BRENNAN, GI bleed, Candidemia/Candida empyema, and anxiety.  Code called 11/2 for bradycardia/asystole, progressive hypotension, found to have significant GI bleed, EGD with adherent clot near PEG tube site that was clipped. Discharged to ARU 12/13-12/30 for ongoing rehabilitation. This is his first post discharge follow up.     1. S/p BSLT for ILD:  Bilateral pleural effusions: complicated by above. S/p left chest tube placement (11/3-11/30) for pleural effusion/empyem s/p lytics 11/25-11/28 (CXR showing trapped left lung), right thoracentesis 11/27.  Chest CT (11/22) with new LLL cavitary lesion with multifocal GGO in BUL, new 1.8 cm fluid collection with surrounding fat stranding in the left axilla, decreased bilateral loculated pleural effusions, and similar small pericardial effusion. Trach decannulated 12/8. CMV/EBV 12/15 negative. Chest CT 12/20 with overall decreased size and number of bilateral infectious/inflammatory GGO, decreased but persistent LLL cavitary lesion, and decreased bilateral pleural loculated effusions. CXR reviewed today demonstrates stable right effusion, slight decrease in RLL opacity, ongoing LLL lesion. PFTs are lower than expected for his first post transplant, likely exacerbated by acute illness, ongoing weakness/deconditioning.       Immunosuppression: s/p induction therapy with basiliximab 10/16 (and high dose IV steroid) and 10/20/  - Tacrolimus goal level 8-12, pending for today  - MMF 1000 mg BID (decreased 11/2 given GI bleed, AZA to be avoided given TPMT)  - Prednisone 10 mg/7.5 mg per taper below    Date AM dose (mg) PM dose (mg)   12/5/21 10 10   1/2/22 10 7.5   1/30/22 7.5 7.5   2/27/22 7.5 5   3/27/22 5 5   4/24/22 5 2.5      Prophylaxis:   - Bactrim for PJP ppx, monitor given BRENNAN  - Nystatin for oral candidiasis ppx, 6 month course  - See below for serologies and viral ppx:  - Check EBV every 3 months      Donor Recipient Intervention   CMV status Negative Negative CMV monthly   EBV status Positive Positive EBV every 3 months   HSV status N/A Positive S/p acyclovir POD #1-30 (recent infection history pre-txp)      2. Positive cross match:   Positive DSA: notes that he received two doses of rituximab in June, which is likely contributing to cross match result. DSA newly positive 11/29 with DQB5 (mfi 2522), decreased (mfi 1873) on 12/6, then increased to 3924 up from 1703 on 12/27.   - DSA pending today.      3. Concern for possible Strongyloides exposure pre-transplant: s/p ivermectin x 1 dose.  Donor and recipient cultures NGTD.      4. Klebsiella pneumoniae:  Pseudomonas fluorescens/putida:   LLL cavity: Klebsiella initially noted trach sputum culture 11/10. Started on ceftriaxone 11/11, transitioned to ertapenem 11/12-11/13, and back to ceftriaxone 11/14.  Bronch culture 11/12 with Klebsiella and Pseudomonas fluorescens/putida.  Chest CT 11/22 with LLL cavity as above, BAL with Klebsiella pneumoniae. Histo Ag 11/22, 11/23 and Blast Ag 11/22 negative. S/p Zosyn (11/15-11/23) and levofloxacin (11/23-12/7).  Transplant ID evaluated 12/21 with recommendation to continue ceftazidime (po levofloxacin at time of discharge) until 1/18/22.  - Continue levaquin   - Transplant ID follow up scheduled 1/18/22 with repeat chest CT  prior      5. Disseminated Candida: on blood cultures 10/20 and 10/22. Respiratory cultures with persistent Candida albicans. TC 10/23 without evidence of endocarditis.  Ophthalmology consult 10/24 with benign dilated fundoscopic exam.  Candida empyema also noted 10/25, chest tubes inadvertently removed by CVTS 10/28, left chest tube replaced by IR 11/3 as above with ongoing Candida on cultures (11/3, 11/18).  BDG fungitell positive (>500) and Aspergillus galactomannan negative 11/22.  - Continue IV fluconazole until 1/18/22  - Transplant f/u per above     6. HSV:chronic intermittent active infection pre-transplant with recent HSV infection with crusted lesions throughout left side of jaw, s/p 10 day treatment course of ACV through 10/9.     7. Hypogammaglobulinemia: IgG previously low at 364 on 9/7.  Noted at 265 at time of transplant, s/p IVIG 10/21, repeat IgG  on11/17 still low at 198, s/p IVIG on 11/18.  Repeat IVIG on 12/15 of 365.  IVIG indicated (and ordered) per protocol, but was told ARU is unable to administer IVIG and patient is unable to leave to receive it in an infusion center.  - IgG pending for today    8. BRENNAN: Cr up to 1.79 from 1.00, progressive increase over the last two weeks secondary to pre renal, unsure how much fluid he is taking in, now exacerbated by emesis this am. Did take lasix this morning, no edema on exam.  - Hold lasix T/Wed, resume at 20 mg daily on Thursday with reevaluation for ongoing need in clinic on Friday  - IVF today and tomorrow, recheck labs on Wed    9. Nausea and vomiting: nausea started last evening, emesis X 1 today, did throw up two MMF pills. Having small BMs.   - IVF per above  - AXR today      10. SALTY: noted pre-transplant. Supposed to be on home CPAP, will need to confirm.    11. Hypomagnesemia: secondary to CNI. Mg 1.4 today.   - Will give IV Mg 2 grams today  - Continue Mg oxide 800 mg BID, may need to increase    12. HTN: BP elevated in clinic today at  128/91, likely secondary to current acute N/V.   - Continue amlodipine    13. Depression/anxiety: mood is stable. Follows with his PCP.   - Continue escitalopram    14. Steroid induced hyperglycemia: last AIC of 5.3 on 11/15/21. BS have ranged from 111-145 with recent prednisone reduction yesterday.   - Decrease Lantus to 5 U daily    15. Multifocal acute to subacute ischemic stroke: etiology likely embolic vs perioperative. MRI brain 10/23 with infarcts noted in both cerebral hemispheres (R frontal, L corona radiata, bilateral occipital), left cerebellum, presumed associated with new Afib vs perioperative. Bilateral upper extremity paresis (R>L), suspicion for some cortical blindness. SBP goal < 140.   - Continue warfarin (also for atrial fib ), BS control and rosuvastatin    16. Afib with RVR: noted 10/18, started on amiodarone drip and converted to SR, transitioned to PO 10/21, decreased 10/29.  Metoprolol and amiodarone discontinued 11/19 d/t intermittent bradycardia.   - Consider Zio Patch in the next few weeks with EP follow up  - On warfarin    17. Rheumatoid arthritis: previously treated with rituximab.  Rheumatology familiar and consulted early in admission. Will need follow up.      18. Severe malnutrition in the context of acute on chronic illness: or injury and evidenced by >7.5% weight loss in the past 3 months. PEG/J tube removed 12/24 at bedside with improved PO intake. Appetite is okay.   -Continue multivitamin, folate, B6, B12 supplements     19. H/o GIB: progressive hypotension and CODE BLUE for bradycardia/asystole associated with GI bleed in the setting of anticoagulation, requiring massive transfusion protocol on 10/22. EGD on 11/2 with clotted blood in stomach, adherent clot near PEG site that was clipped without active bleeding.  Most recent EGD 11/5 with ulcer noted near the PEG bumper site, gastritis, suction marks from the G-tube. GJ tube was exchanged 11/9 by GI, removed on 12/24.  "  -Continue PPI BID    20. Hypothyroidism: TSH 3.17 on 11/19.   -Continue levothyroxine 25 mcg daily      RTC: Friday with week with Dr. Johns  Influenza and other vaccinations: has not had the covid vaccine; deferred Jerry today given N/V, discussed how this can be administered at next visit if he is feeling well, then can receive his first covid vaccine dose 2 weeks later (which will also put him > 3 months post transplant)  Annual dermatology visit:    Angela Luna PA-C  Pulmonary, Allergy, Critical Care and Sleep Medicine        Interval History:     Since discharge, the weekend went well until last night. Started to have nausea last night, vomiting X 1 this am, did throw up two pills (MMF). No abdominal pain, no bloating or gas. Having BMs, one/day, small to medium, no straining. No fever or chills, breathing is labored at times with walking, or sitting up, not new. Overall, feels his breathing is improving. Minimal cough with occasional mucous production. Started pulmonary rehab tomorrow. Hydrating is \"okay\" and eating is \"so-so\". Unsure how much fluid he is taking in.           Review of Systems:   Please see HPI, otherwise the complete 10 point ROS is negative.           Past Medical and Surgical History:     Past Medical History:   Diagnosis Date     BRENNAN (acute kidney injury) (H) 10/17/2021     Anxiety      Depression      HLD (hyperlipidemia)      HTN (hypertension)      Hypothyroidism      ILD (interstitial lung disease) (H)      SALTY on CPAP      Oxygen dependent     BL 4L since ~6/2021     Rheumatoid arthritis (H)     signs ~5/2020, dx 5/2021     S/P lung transplant (H) 10/16/2021     Shock liver 10/17/2021     Steroid-induced hyperglycemia      Traction bronchiectasis (H)      Past Surgical History:   Procedure Laterality Date     COLONOSCOPY W/ BIOPSIES AND POLYPECTOMY  07/21/2020     CV CORONARY ANGIOGRAM N/A 09/08/2021    Procedure: Coronary Angiogram with possible intervention;  Surgeon: Kobe " Jovon Richard MD;  Location:  HEART CARDIAC CATH LAB     CV RIGHT HEART CATH MEASUREMENTS RECORDED N/A 09/08/2021    Procedure: Right Heart Cath;  Surgeon: Jovon Bullock MD;  Location:  HEART CARDIAC CATH LAB     ESOPHAGOSCOPY, GASTROSCOPY, DUODENOSCOPY (EGD), COMBINED N/A 10/23/2021    Procedure: ESOPHAGOGASTRODUODENOSCOPY (EGD);  Surgeon: Miquel Pisano MD;  Location:  GI     ESOPHAGOSCOPY, GASTROSCOPY, DUODENOSCOPY (EGD), COMBINED N/A 11/02/2021    Procedure: ESOPHAGOGASTRODUODENOSCOPY (EGD);  Surgeon: Daniel Ortiz MD;  Location:  GI     ESOPHAGOSCOPY, GASTROSCOPY, DUODENOSCOPY (EGD), COMBINED N/A 11/5/2021    Procedure: ESOPHAGOGASTRODUODENOSCOPY (EGD);  Surgeon: Ronnell Hernandez MD;  Location:  GI     EXTRACTION(S) DENTAL N/A 09/22/2021    Procedure: EXTRACTION tooth #19;  Surgeon: Deepak Tobin DDS;  Location: [facility] OR     HERNIA REPAIR       IR CHEST TUBE PLACEMENT NON-TUNNELLED LEFT  11/03/2021     PICC TRIPLE LUMEN PLACEMENT Left 11/04/2021    5FR TL PICC. Right non occlusive thrombus subclavian vein.     REPLACE GASTROJEJUNOSTOMY TUBE, PERCUTANEOUS N/A 11/9/2021    Procedure: REPLACEMENT, GASTROJEJUNOSTOMY TUBE, PERCUTANEOUS;  Surgeon: Hernando Rodriguez MD;  [facility]     right acl       TRACHEOSTOMY PERCUTANEOUS N/A 10/29/2021    Procedure: Percutaneous Tracheostomy,;  Surgeon: Ceilne Jenkins MD;  Location: [facility] OR     TRANSPLANT LUNG RECIPIENT SINGLE X2 Bilateral 10/16/2021    Procedure: TRANSPLANT, LUNG, RECIPIENT, BILATERAL, Bronchoscopy, on-pump perfusion, bilateral clamshell sternotomy;  Surgeon: Yanick Corral MD;  Location: [facility] OR     XR ACROMIOCLAVICULAR JOINT BILATERAL             Family History:     Family History   Problem Relation Age of Onset     Diabetes Type 1 Mother      Heart Disease Mother      Chronic Obstructive Pulmonary Disease Mother      Rheumatoid Arthritis Father      Emphysema Paternal Grandfather              Social History:     Social History     Socioeconomic History     Marital status: Single     Spouse name: Not on file     Number of children: Not on file     Years of education: Not on file     Highest education level: Not on file   Occupational History     Not on file   Tobacco Use     Smoking status: Never Smoker     Smokeless tobacco: Never Used   Substance and Sexual Activity     Alcohol use: Never     Drug use: Never     Sexual activity: Not on file   Other Topics Concern     Parent/sibling w/ CABG, MI or angioplasty before 65F 55M? Not Asked   Social History Narrative     Not on file     Social Determinants of Health     Financial Resource Strain: Not on file   Food Insecurity: Not on file   Transportation Needs: Not on file   Physical Activity: Not on file   Stress: Not on file   Social Connections: Not on file   Intimate Partner Violence: Not on file   Housing Stability: Not on file            Medications:     Current Outpatient Medications   Medication     acetaminophen (TYLENOL) 325 MG tablet     amLODIPine (NORVASC) 5 MG tablet     calcium carbonate 600 mg-vitamin D 400 units (CALTRATE) 600-400 MG-UNIT per tablet     escitalopram (LEXAPRO) 20 MG tablet     fluconazole (DIFLUCAN) 400-0.9 MG/200ML-% infusion     fluticasone (FLONASE) 50 MCG/ACT nasal spray     folic acid-vit B6-vit B12 (FOLGARD) 0.8-10-0.115 MG TABS per tablet     furosemide (LASIX) 40 MG tablet     hydrOXYzine (ATARAX) 25 MG tablet     insulin glargine (LANTUS PEN) 100 UNIT/ML pen     levalbuterol (XOPENEX HFA) 45 MCG/ACT inhaler     levofloxacin (LEVAQUIN) 500 MG tablet     levothyroxine (SYNTHROID/LEVOTHROID) 25 MCG tablet     magnesium oxide (MAG-OX) 400 MG tablet     melatonin 10 MG TABS tablet     multivitamin w/minerals (THERA-VIT-M) tablet     mycophenolate (GENERIC EQUIVALENT) 250 MG capsule     nystatin (MYCOSTATIN) 989149 UNIT/ML suspension     pantoprazole (PROTONIX) 40 MG EC tablet     predniSONE (DELTASONE) 5 MG tablet      rosuvastatin (CRESTOR) 10 MG tablet     sulfamethoxazole-trimethoprim (BACTRIM) 400-80 MG tablet     tacrolimus (GENERIC EQUIVALENT) 1 MG capsule     traZODone (DESYREL) 50 MG tablet     warfarin ANTICOAGULANT (COUMADIN) 1 MG tablet     warfarin ANTICOAGULANT (COUMADIN) 1 MG tablet     No current facility-administered medications for this visit.            Physical Exam:   BP (!) 128/91   Pulse 114   SpO2 95%     GENERAL: alert, NAD  HEENT: NCAT, EOMI, no scleral icterus, oral mucosa moist and without lesions  Neck: no cervical or supraclavicular adenopathy  Lungs: moderate airflow, decreased in bases  CV: RRR, S1S2, no murmurs noted  Abdomen: normoactive BS, soft, mild periumbilical tenderness  Lymph: no edema  Neuro: AAO X 3, CN 2-12 grossly intact  Psychiatric: normal affect, good eye contact  Skin: no rash, jaundice or lesions on limited exam         Data:   All laboratory and imaging data reviewed.      Recent Results (from the past 168 hour(s))   Glucose by meter    Collection Time: 12/27/21 12:07 PM   Result Value Ref Range    GLUCOSE BY METER POCT 162 (H) 70 - 99 mg/dL   Glucose by meter    Collection Time: 12/27/21  5:17 PM   Result Value Ref Range    GLUCOSE BY METER POCT 140 (H) 70 - 99 mg/dL   Glucose by meter    Collection Time: 12/27/21  9:38 PM   Result Value Ref Range    GLUCOSE BY METER POCT 118 (H) 70 - 99 mg/dL   Glucose by meter    Collection Time: 12/28/21  2:31 AM   Result Value Ref Range    GLUCOSE BY METER POCT 136 (H) 70 - 99 mg/dL   INR    Collection Time: 12/28/21  7:27 AM   Result Value Ref Range    INR 2.82 (H) 0.86 - 1.14   Extra Green Top (Lithium Heparin) Tube    Collection Time: 12/28/21  7:27 AM   Result Value Ref Range    Hold Specimen JIC    Extra Purple Top Tube    Collection Time: 12/28/21  7:27 AM   Result Value Ref Range    Hold Specimen JIC    Glucose by meter    Collection Time: 12/28/21  8:22 AM   Result Value Ref Range    GLUCOSE BY METER POCT 118 (H) 70 - 99 mg/dL    Glucose by meter    Collection Time: 12/28/21 12:21 PM   Result Value Ref Range    GLUCOSE BY METER POCT 110 (H) 70 - 99 mg/dL   Glucose by meter    Collection Time: 12/28/21  5:07 PM   Result Value Ref Range    GLUCOSE BY METER POCT 144 (H) 70 - 99 mg/dL   Glucose by meter    Collection Time: 12/28/21  9:08 PM   Result Value Ref Range    GLUCOSE BY METER POCT 91 70 - 99 mg/dL   Glucose by meter    Collection Time: 12/29/21  8:19 AM   Result Value Ref Range    GLUCOSE BY METER POCT 86 70 - 99 mg/dL   Glucose by meter    Collection Time: 12/29/21 12:25 PM   Result Value Ref Range    GLUCOSE BY METER POCT 99 70 - 99 mg/dL   Glucose by meter    Collection Time: 12/29/21  5:43 PM   Result Value Ref Range    GLUCOSE BY METER POCT 115 (H) 70 - 99 mg/dL   Glucose by meter    Collection Time: 12/29/21  9:35 PM   Result Value Ref Range    GLUCOSE BY METER POCT 128 (H) 70 - 99 mg/dL   Tacrolimus by Tandem Mass Spectrometry    Collection Time: 12/30/21  7:30 AM   Result Value Ref Range    Tacrolimus by Tandem Mass Spectrometry 14.1 5.0 - 15.0 ug/L    Tacrolimus Last Dose Date      Tacrolimus Last Dose Time     INR    Collection Time: 12/30/21  7:30 AM   Result Value Ref Range    INR 2.79 (H) 0.86 - 1.14   CBC with platelets    Collection Time: 12/30/21  7:30 AM   Result Value Ref Range    WBC Count 11.1 (H) 4.0 - 11.0 10e3/uL    RBC Count 4.23 (L) 4.40 - 5.90 10e6/uL    Hemoglobin 11.5 (L) 13.3 - 17.7 g/dL    Hematocrit 36.4 (L) 40.0 - 53.0 %    MCV 86 78 - 100 fL    MCH 27.2 26.5 - 33.0 pg    MCHC 31.6 31.5 - 36.5 g/dL    RDW 14.0 10.0 - 15.0 %    Platelet Count 197 150 - 450 10e3/uL   Basic metabolic panel    Collection Time: 12/30/21  7:30 AM   Result Value Ref Range    Sodium 139 133 - 144 mmol/L    Potassium 4.6 3.4 - 5.3 mmol/L    Chloride 105 94 - 109 mmol/L    Carbon Dioxide (CO2) 27 20 - 32 mmol/L    Anion Gap 7 3 - 14 mmol/L    Urea Nitrogen 24 7 - 30 mg/dL    Creatinine 1.00 0.66 - 1.25 mg/dL    Calcium 9.7 8.5 -  10.1 mg/dL    Glucose 100 (H) 70 - 99 mg/dL    GFR Estimate 88 >60 mL/min/1.73m2   Magnesium    Collection Time: 12/30/21  7:30 AM   Result Value Ref Range    Magnesium 1.5 (L) 1.6 - 2.3 mg/dL   IgG    Collection Time: 12/30/21  7:30 AM   Result Value Ref Range    Immunoglobulin G 446 (L) 610-1,616 mg/dL   Glucose by meter    Collection Time: 12/30/21  8:11 AM   Result Value Ref Range    GLUCOSE BY METER POCT 100 (H) 70 - 99 mg/dL   Basic metabolic panel    Collection Time: 01/03/22  7:43 AM   Result Value Ref Range    Sodium 141 133 - 144 mmol/L    Potassium 4.4 3.4 - 5.3 mmol/L    Chloride 105 94 - 109 mmol/L    Carbon Dioxide (CO2) 22 20 - 32 mmol/L    Anion Gap 14 3 - 14 mmol/L    Urea Nitrogen 39 (H) 7 - 30 mg/dL    Creatinine 1.79 (H) 0.66 - 1.25 mg/dL    Calcium 10.2 (H) 8.5 - 10.1 mg/dL    Glucose 114 (H) 70 - 99 mg/dL    GFR Estimate 44 (L) >60 mL/min/1.73m2   Magnesium    Collection Time: 01/03/22  7:43 AM   Result Value Ref Range    Magnesium 1.4 (L) 1.6 - 2.3 mg/dL   CBC with platelets    Collection Time: 01/03/22  7:43 AM   Result Value Ref Range    WBC Count 14.2 (H) 4.0 - 11.0 10e3/uL    RBC Count 5.26 4.40 - 5.90 10e6/uL    Hemoglobin 14.1 13.3 - 17.7 g/dL    Hematocrit 43.4 40.0 - 53.0 %    MCV 83 78 - 100 fL    MCH 26.8 26.5 - 33.0 pg    MCHC 32.5 31.5 - 36.5 g/dL    RDW 14.0 10.0 - 15.0 %    Platelet Count 237 150 - 450 10e3/uL   INR    Collection Time: 01/03/22  7:43 AM   Result Value Ref Range    INR 4.01 (H) 0.85 - 1.15   General PFT Lab (Please always keep checked)    Collection Time: 01/03/22  8:00 AM   Result Value Ref Range    FVC-Pred 3.98 L    FVC-Pre 2.11 L    FVC-%Pred-Pre 52 %    FEV1-Pre 1.27 L    FEV1-%Pred-Pre 40 %    FEV1FVC-Pred 79 %    FEV1FVC-Pre 60 %    FEFMax-Pred 8.37 L/sec    FEFMax-Pre 4.48 L/sec    FEFMax-%Pred-Pre 53 %    FEF2575-Pred 2.83 L/sec    FEF2575-Pre 0.62 L/sec    RWT5234-%Pred-Pre 21 %    ExpTime-Pre 7.29 sec    FIFMax-Pre 4.70 L/sec    FEV1FEV6-Pred 80 %     FEV1FEV6-Pre 62 %     PFT interpretation:  Maneuver: valid and meets ATS guidelines  Severe obstruction with decreased FEV1/FVC and FEV1  The decrease in FVC may represent air trapping v. restrictive physiology.  Lung volumes would be necessary to determine.

## 2022-01-03 ENCOUNTER — ANCILLARY PROCEDURE (OUTPATIENT)
Dept: GENERAL RADIOLOGY | Facility: CLINIC | Age: 57
End: 2022-01-03
Attending: PHYSICIAN ASSISTANT
Payer: COMMERCIAL

## 2022-01-03 ENCOUNTER — LAB (OUTPATIENT)
Dept: LAB | Facility: CLINIC | Age: 57
End: 2022-01-03
Payer: COMMERCIAL

## 2022-01-03 ENCOUNTER — HOME INFUSION (PRE-WILLOW HOME INFUSION) (OUTPATIENT)
Dept: PHARMACY | Facility: CLINIC | Age: 57
End: 2022-01-03

## 2022-01-03 ENCOUNTER — ANCILLARY PROCEDURE (OUTPATIENT)
Dept: GENERAL RADIOLOGY | Facility: CLINIC | Age: 57
End: 2022-01-03
Attending: INTERNAL MEDICINE
Payer: COMMERCIAL

## 2022-01-03 ENCOUNTER — OFFICE VISIT (OUTPATIENT)
Dept: PULMONOLOGY | Facility: CLINIC | Age: 57
End: 2022-01-03
Attending: PHYSICIAN ASSISTANT
Payer: COMMERCIAL

## 2022-01-03 ENCOUNTER — INFUSION THERAPY VISIT (OUTPATIENT)
Dept: INFUSION THERAPY | Facility: CLINIC | Age: 57
End: 2022-01-03
Attending: PHYSICIAN ASSISTANT
Payer: COMMERCIAL

## 2022-01-03 ENCOUNTER — TELEPHONE (OUTPATIENT)
Dept: TRANSPLANT | Facility: CLINIC | Age: 57
End: 2022-01-03

## 2022-01-03 ENCOUNTER — ANTICOAGULATION THERAPY VISIT (OUTPATIENT)
Dept: ANTICOAGULATION | Facility: CLINIC | Age: 57
End: 2022-01-03

## 2022-01-03 VITALS — DIASTOLIC BLOOD PRESSURE: 91 MMHG | OXYGEN SATURATION: 95 % | SYSTOLIC BLOOD PRESSURE: 128 MMHG | HEART RATE: 114 BPM

## 2022-01-03 VITALS — SYSTOLIC BLOOD PRESSURE: 135 MMHG | DIASTOLIC BLOOD PRESSURE: 91 MMHG

## 2022-01-03 DIAGNOSIS — Z94.2 LUNG REPLACED BY TRANSPLANT (H): ICD-10-CM

## 2022-01-03 DIAGNOSIS — I48.91 ATRIAL FIBRILLATION, UNSPECIFIED TYPE (H): Primary | ICD-10-CM

## 2022-01-03 DIAGNOSIS — Z79.4 TYPE 2 DIABETES MELLITUS WITH HYPERGLYCEMIA, WITH LONG-TERM CURRENT USE OF INSULIN (H): ICD-10-CM

## 2022-01-03 DIAGNOSIS — E11.65 TYPE 2 DIABETES MELLITUS WITH HYPERGLYCEMIA, WITH LONG-TERM CURRENT USE OF INSULIN (H): ICD-10-CM

## 2022-01-03 DIAGNOSIS — Z79.899 ENCOUNTER FOR LONG-TERM (CURRENT) USE OF HIGH-RISK MEDICATION: ICD-10-CM

## 2022-01-03 DIAGNOSIS — Z94.2 S/P LUNG TRANSPLANT (H): Chronic | ICD-10-CM

## 2022-01-03 DIAGNOSIS — I48.91 ATRIAL FIBRILLATION, UNSPECIFIED TYPE (H): ICD-10-CM

## 2022-01-03 DIAGNOSIS — N17.9 AKI (ACUTE KIDNEY INJURY) (H): Primary | ICD-10-CM

## 2022-01-03 DIAGNOSIS — Z94.2 S/P LUNG TRANSPLANT (H): ICD-10-CM

## 2022-01-03 LAB
ANION GAP SERPL CALCULATED.3IONS-SCNC: 14 MMOL/L (ref 3–14)
BUN SERPL-MCNC: 39 MG/DL (ref 7–30)
CALCIUM SERPL-MCNC: 10.2 MG/DL (ref 8.5–10.1)
CHLORIDE BLD-SCNC: 105 MMOL/L (ref 94–109)
CO2 SERPL-SCNC: 22 MMOL/L (ref 20–32)
CREAT SERPL-MCNC: 1.79 MG/DL (ref 0.66–1.25)
ERYTHROCYTE [DISTWIDTH] IN BLOOD BY AUTOMATED COUNT: 14 % (ref 10–15)
EXPTIME-PRE: 7.29 SEC
FEF2575-%PRED-PRE: 21 %
FEF2575-PRE: 0.62 L/SEC
FEF2575-PRED: 2.83 L/SEC
FEFMAX-%PRED-PRE: 53 %
FEFMAX-PRE: 4.48 L/SEC
FEFMAX-PRED: 8.37 L/SEC
FEV1-%PRED-PRE: 40 %
FEV1-PRE: 1.27 L
FEV1FEV6-PRE: 62 %
FEV1FEV6-PRED: 80 %
FEV1FVC-PRE: 60 %
FEV1FVC-PRED: 79 %
FIFMAX-PRE: 4.7 L/SEC
FVC-%PRED-PRE: 52 %
FVC-PRE: 2.11 L
FVC-PRED: 3.98 L
GFR SERPL CREATININE-BSD FRML MDRD: 44 ML/MIN/1.73M2
GLUCOSE BLD-MCNC: 114 MG/DL (ref 70–99)
HCT VFR BLD AUTO: 43.4 % (ref 40–53)
HGB BLD-MCNC: 14.1 G/DL (ref 13.3–17.7)
IGG SERPL-MCNC: 502 MG/DL (ref 610–1616)
INR PPP: 4.01 (ref 0.85–1.15)
MAGNESIUM SERPL-MCNC: 1.4 MG/DL (ref 1.6–2.3)
MCH RBC QN AUTO: 26.8 PG (ref 26.5–33)
MCHC RBC AUTO-ENTMCNC: 32.5 G/DL (ref 31.5–36.5)
MCV RBC AUTO: 83 FL (ref 78–100)
PLATELET # BLD AUTO: 237 10E3/UL (ref 150–450)
POTASSIUM BLD-SCNC: 4.4 MMOL/L (ref 3.4–5.3)
RBC # BLD AUTO: 5.26 10E6/UL (ref 4.4–5.9)
SODIUM SERPL-SCNC: 141 MMOL/L (ref 133–144)
TACROLIMUS BLD-MCNC: 16.2 UG/L (ref 5–15)
TME LAST DOSE: ABNORMAL H
TME LAST DOSE: ABNORMAL H
WBC # BLD AUTO: 14.2 10E3/UL (ref 4–11)

## 2022-01-03 PROCEDURE — 83735 ASSAY OF MAGNESIUM: CPT | Performed by: PATHOLOGY

## 2022-01-03 PROCEDURE — 94375 RESPIRATORY FLOW VOLUME LOOP: CPT | Performed by: PHYSICIAN ASSISTANT

## 2022-01-03 PROCEDURE — 96365 THER/PROPH/DIAG IV INF INIT: CPT

## 2022-01-03 PROCEDURE — 80197 ASSAY OF TACROLIMUS: CPT | Mod: 90 | Performed by: PATHOLOGY

## 2022-01-03 PROCEDURE — 74018 RADEX ABDOMEN 1 VIEW: CPT | Mod: GC | Performed by: RADIOLOGY

## 2022-01-03 PROCEDURE — 82784 ASSAY IGA/IGD/IGG/IGM EACH: CPT | Mod: 90 | Performed by: PATHOLOGY

## 2022-01-03 PROCEDURE — 86833 HLA CLASS II HIGH DEFIN QUAL: CPT | Performed by: PATHOLOGY

## 2022-01-03 PROCEDURE — 96366 THER/PROPH/DIAG IV INF ADDON: CPT

## 2022-01-03 PROCEDURE — 80048 BASIC METABOLIC PNL TOTAL CA: CPT | Performed by: PATHOLOGY

## 2022-01-03 PROCEDURE — 258N000003 HC RX IP 258 OP 636: Performed by: PHYSICIAN ASSISTANT

## 2022-01-03 PROCEDURE — 85027 COMPLETE CBC AUTOMATED: CPT | Performed by: PATHOLOGY

## 2022-01-03 PROCEDURE — 250N000011 HC RX IP 250 OP 636: Performed by: PHYSICIAN ASSISTANT

## 2022-01-03 PROCEDURE — 99000 SPECIMEN HANDLING OFFICE-LAB: CPT | Performed by: PATHOLOGY

## 2022-01-03 PROCEDURE — 36415 COLL VENOUS BLD VENIPUNCTURE: CPT | Performed by: PATHOLOGY

## 2022-01-03 PROCEDURE — 86832 HLA CLASS I HIGH DEFIN QUAL: CPT | Performed by: PATHOLOGY

## 2022-01-03 PROCEDURE — 99496 TRANSJ CARE MGMT HIGH F2F 7D: CPT | Mod: 25 | Performed by: PHYSICIAN ASSISTANT

## 2022-01-03 PROCEDURE — G0463 HOSPITAL OUTPT CLINIC VISIT: HCPCS | Mod: 25

## 2022-01-03 PROCEDURE — 71046 X-RAY EXAM CHEST 2 VIEWS: CPT | Mod: GC | Performed by: RADIOLOGY

## 2022-01-03 PROCEDURE — 85610 PROTHROMBIN TIME: CPT | Performed by: PATHOLOGY

## 2022-01-03 RX ORDER — ACETAMINOPHEN 325 MG/1
975 TABLET ORAL EVERY 8 HOURS PRN
COMMUNITY
Start: 2022-01-03 | End: 2022-03-24

## 2022-01-03 RX ORDER — MAGNESIUM SULFATE HEPTAHYDRATE 40 MG/ML
2 INJECTION, SOLUTION INTRAVENOUS ONCE
Status: CANCELLED
Start: 2022-01-03 | End: 2022-01-03

## 2022-01-03 RX ORDER — MAGNESIUM SULFATE HEPTAHYDRATE 40 MG/ML
2 INJECTION, SOLUTION INTRAVENOUS ONCE
Status: COMPLETED | OUTPATIENT
Start: 2022-01-03 | End: 2022-01-03

## 2022-01-03 RX ADMIN — SODIUM CHLORIDE 1000 ML: 9 INJECTION, SOLUTION INTRAVENOUS at 10:00

## 2022-01-03 RX ADMIN — MAGNESIUM SULFATE HEPTAHYDRATE 2 G: 40 INJECTION, SOLUTION INTRAVENOUS at 10:04

## 2022-01-03 NOTE — LETTER
1/3/2022         RE: Edson Thornton  [address]  [place] [address]        Dear Colleague,    Thank you for referring your patient, Edson Thornton, to the [facility]. Please see a copy of my visit note below.    Infusion Nursing Note:  Edson Thornton presents today for IVF/Mag.    Patient seen by provider today: Yes: in clinic   present during visit today: Not Applicable.    Note:   -1 L of NS infused over 1 hr  -2g of magnesium infused over 1 hr  -Will be back tomorrow for fluids only    Intravenous Access:  PICC.    Treatment Conditions:  Not Applicable.    Post Infusion Assessment:  Patient tolerated infusion without incident.  Blood return noted pre and post infusion.  Site patent and intact, free from redness, edema or discomfort.  No evidence of extravasations.     Discharge Plan:   AVS to patient via MYCHART.  Patient will return 1/4/22 for next appointment.   Patient discharged in stable condition accompanied by: self.  Departure Mode: Wheelchair.    Autumn Peng RN    Administrations This Visit     0.9% sodium chloride BOLUS     Admin Date  01/03/2022 Action  New Bag Dose  1,000 mL Rate  1,000 mL/hr Route  Intravenous Administered By  Autumn Peng, TIFFANY          magnesium sulfate 2 g in water intermittent infusion     Admin Date  01/03/2022 Action  New Bag Dose  2 g Rate  50 mL/hr Route  Intravenous Administered By  Autumn Peng RN                  Again, thank you for allowing me to participate in the care of your patient.      Sincerely,    [facility]

## 2022-01-03 NOTE — LETTER
1/3/2022     RE: Edson Thornton  [address]  [place] [address]    Dear Colleague,    Thank you for referring your patient, Edson Thornton, to the [facility] [place]. Please see a copy of my visit note below.    [facility]  January 3, 2022         Assessment and Plan:   Edson Thornton is a 56 year old male with NSIP/ILD, bronchiectasis, moderate PH, RA, SALTY, chronic HSV infection, hypogammaglobulinemia, steroid-induced diabetes, hypothyroidism, PFO, HTN, HLD, duodenal anomaly, anxiety, and depression. Admitted on 9/5/21 from OSH for acute on chronic respiratory failure 2/2 ILD exacerbation now s/p BSLT on 10/16/21. Prolonged vent wean s/p trach (10/29-12/8) and PEG/J tube (10/29-12/24).  Post-op course also complicated by encephalopathy and diffuse weakness, acute to subacute CVA, afib with RVR, BRENNAN, GI bleed, Candidemia/Candida empyema, and anxiety.  Code called 11/2 for bradycardia/asystole, progressive hypotension, found to have significant GI bleed, EGD with adherent clot near PEG tube site that was clipped. Discharged to ARU 12/13-12/30 for ongoing rehabilitation. This is his first post discharge follow up.     1. S/p BSLT for ILD:  Bilateral pleural effusions: complicated by above. S/p left chest tube placement (11/3-11/30) for pleural effusion/empyem s/p lytics 11/25-11/28 (CXR showing trapped left lung), right thoracentesis 11/27.  Chest CT (11/22) with new LLL cavitary lesion with multifocal GGO in BUL, new 1.8 cm fluid collection with surrounding fat stranding in the left axilla, decreased bilateral loculated pleural effusions, and similar small pericardial effusion. Trach decannulated 12/8. CMV/EBV 12/15 negative. Chest CT 12/20 with overall decreased size and number of bilateral infectious/inflammatory GGO, decreased but persistent LLL cavitary lesion, and decreased bilateral  pleural loculated effusions. CXR reviewed today demonstrates stable right effusion, slight decrease in RLL opacity, ongoing LLL lesion. PFTs are lower than expected for his first post transplant, likely exacerbated by acute illness, ongoing weakness/deconditioning.      Immunosuppression: s/p induction therapy with basiliximab 10/16 (and high dose IV steroid) and 10/20/  - Tacrolimus goal level 8-12, pending for today  - MMF 1000 mg BID (decreased 11/2 given GI bleed, AZA to be avoided given TPMT)  - Prednisone 10 mg/7.5 mg per taper below    Date AM dose (mg) PM dose (mg)   12/5/21 10 10   1/2/22 10 7.5   1/30/22 7.5 7.5   2/27/22 7.5 5   3/27/22 5 5   4/24/22 5 2.5      Prophylaxis:   - Bactrim for PJP ppx, monitor given BRENNAN  - Nystatin for oral candidiasis ppx, 6 month course  - See below for serologies and viral ppx:  - Check EBV every 3 months      Donor Recipient Intervention   CMV status Negative Negative CMV monthly   EBV status Positive Positive EBV every 3 months   HSV status N/A Positive S/p acyclovir POD #1-30 (recent infection history pre-txp)      2. Positive cross match:   Positive DSA: notes that he received two doses of rituximab in June, which is likely contributing to cross match result. DSA newly positive 11/29 with DQB5 (mfi 2522), decreased (mfi 1873) on 12/6, then increased to 3924 up from 1703 on 12/27.   - DSA pending today.      3. Concern for possible Strongyloides exposure pre-transplant: s/p ivermectin x 1 dose.  Donor and recipient cultures NGTD.      4. Klebsiella pneumoniae:  Pseudomonas fluorescens/putida:   LLL cavity: Klebsiella initially noted trach sputum culture 11/10. Started on ceftriaxone 11/11, transitioned to ertapenem 11/12-11/13, and back to ceftriaxone 11/14.  Bronch culture 11/12 with Klebsiella and Pseudomonas fluorescens/putida.  Chest CT 11/22 with LLL cavity as above, BAL with Klebsiella pneumoniae. Histo Ag 11/22, 11/23 and Blast Ag 11/22 negative. S/p  Zosyn (11/15-11/23) and levofloxacin (11/23-12/7).  Transplant ID evaluated 12/21 with recommendation to continue ceftazidime (po levofloxacin at time of discharge) until 1/18/22.  - Continue levaquin   - Transplant ID follow up scheduled 1/18/22 with repeat chest CT prior      5. Disseminated Candida: on blood cultures 10/20 and 10/22. Respiratory cultures with persistent Candida albicans. TC 10/23 without evidence of endocarditis.  Ophthalmology consult 10/24 with benign dilated fundoscopic exam.  Candida empyema also noted 10/25, chest tubes inadvertently removed by CVTS 10/28, left chest tube replaced by IR 11/3 as above with ongoing Candida on cultures (11/3, 11/18).  BDG fungitell positive (>500) and Aspergillus galactomannan negative 11/22.  - Continue IV fluconazole until 1/18/22  - Transplant f/u per above     6. HSV:chronic intermittent active infection pre-transplant with recent HSV infection with crusted lesions throughout left side of jaw, s/p 10 day treatment course of ACV through 10/9.     7. Hypogammaglobulinemia: IgG previously low at 364 on 9/7.  Noted at 265 at time of transplant, s/p IVIG 10/21, repeat IgG  on11/17 still low at 198, s/p IVIG on 11/18.  Repeat IVIG on 12/15 of 365.  IVIG indicated (and ordered) per protocol, but was told ARU is unable to administer IVIG and patient is unable to leave to receive it in an infusion center.  - IgG pending for today    8. BRENNAN: Cr up to 1.79 from 1.00, progressive increase over the last two weeks secondary to pre renal, unsure how much fluid he is taking in, now exacerbated by emesis this am. Did take lasix this morning, no edema on exam.  - Hold lasix T/Wed, resume at 20 mg daily on Thursday with reevaluation for ongoing need in clinic on Friday  - IVF today and tomorrow, recheck labs on Wed    9. Nausea and vomiting: nausea started last evening, emesis X 1 today, did throw up two MMF pills. Having small BMs.   - IVF per above  - AXR today      10.  SALTY: noted pre-transplant. Supposed to be on home CPAP, will need to confirm.    11. Hypomagnesemia: secondary to CNI. Mg 1.4 today.   - Will give IV Mg 2 grams today  - Continue Mg oxide 800 mg BID, may need to increase    12. HTN: BP elevated in clinic today at 128/91, likely secondary to current acute N/V.   - Continue amlodipine    13. Depression/anxiety: mood is stable. Follows with his PCP.   - Continue escitalopram    14. Steroid induced hyperglycemia: last AIC of 5.3 on 11/15/21. BS have ranged from 111-145 with recent prednisone reduction yesterday.   - Decrease Lantus to 5 U daily    15. Multifocal acute to subacute ischemic stroke: etiology likely embolic vs perioperative. MRI brain 10/23 with infarcts noted in both cerebral hemispheres (R frontal, L corona radiata, bilateral occipital), left cerebellum, presumed associated with new Afib vs perioperative. Bilateral upper extremity paresis (R>L), suspicion for some cortical blindness. SBP goal < 140.   - Continue warfarin (also for atrial fib ), BS control and rosuvastatin    16. Afib with RVR: noted 10/18, started on amiodarone drip and converted to SR, transitioned to PO 10/21, decreased 10/29.  Metoprolol and amiodarone discontinued 11/19 d/t intermittent bradycardia.   - Consider Zio Patch in the next few weeks with EP follow up  - On warfarin    17. Rheumatoid arthritis: previously treated with rituximab.  Rheumatology familiar and consulted early in admission. Will need follow up.      18. Severe malnutrition in the context of acute on chronic illness: or injury and evidenced by >7.5% weight loss in the past 3 months. PEG/J tube removed 12/24 at bedside with improved PO intake. Appetite is okay.   -Continue multivitamin, folate, B6, B12 supplements     19. H/o GIB: progressive hypotension and CODE BLUE for bradycardia/asystole associated with GI bleed in the setting of anticoagulation, requiring massive transfusion protocol on 10/22. EGD on 11/2 with  "clotted blood in stomach, adherent clot near PEG site that was clipped without active bleeding.  Most recent EGD 11/5 with ulcer noted near the PEG bumper site, gastritis, suction marks from the G-tube. GJ tube was exchanged 11/9 by GI, removed on 12/24.   -Continue PPI BID    20. Hypothyroidism: TSH 3.17 on 11/19.   -Continue levothyroxine 25 mcg daily      RTC: Friday with week with Dr. Johns  Influenza and other vaccinations: has not had the covid vaccine; deferred Evusheld today given N/V, discussed how this can be administered at next visit if he is feeling well, then can receive his first covid vaccine dose 2 weeks later (which will also put him > 3 months post transplant)  Annual dermatology visit:    Angela Luna PA-C  Pulmonary, Allergy, Critical Care and Sleep Medicine        Interval History:     Since discharge, the weekend went well until last night. Started to have nausea last night, vomiting X 1 this am, did throw up two pills (MMF). No abdominal pain, no bloating or gas. Having BMs, one/day, small to medium, no straining. No fever or chills, breathing is labored at times with walking, or sitting up, not new. Overall, feels his breathing is improving. Minimal cough with occasional mucous production. Started pulmonary rehab tomorrow. Hydrating is \"okay\" and eating is \"so-so\". Unsure how much fluid he is taking in.           Review of Systems:   Please see HPI, otherwise the complete 10 point ROS is negative.           Past Medical and Surgical History:     Past Medical History:   Diagnosis Date     BRENNAN (acute kidney injury) (H) 10/17/2021     Anxiety      Depression      HLD (hyperlipidemia)      HTN (hypertension)      Hypothyroidism      ILD (interstitial lung disease) (H)      SALTY on CPAP      Oxygen dependent     BL 4L since ~6/2021     Rheumatoid arthritis (H)     signs ~5/2020, dx 5/2021     S/P lung transplant (H) 10/16/2021     Shock liver 10/17/2021     Steroid-induced hyperglycemia      " Traction bronchiectasis (H)      Past Surgical History:   Procedure Laterality Date     COLONOSCOPY W/ BIOPSIES AND POLYPECTOMY  07/21/2020     CV CORONARY ANGIOGRAM N/A 09/08/2021    Procedure: Coronary Angiogram with possible intervention;  Surgeon: Jovon Bullock MD;  Location:  HEART CARDIAC CATH LAB     CV RIGHT HEART CATH MEASUREMENTS RECORDED N/A 09/08/2021    Procedure: Right Heart Cath;  Surgeon: Jovon Bullock MD;  Location:  HEART CARDIAC CATH LAB     ESOPHAGOSCOPY, GASTROSCOPY, DUODENOSCOPY (EGD), COMBINED N/A 10/23/2021    Procedure: ESOPHAGOGASTRODUODENOSCOPY (EGD);  Surgeon: Miquel Pisano MD;  Location:  GI     ESOPHAGOSCOPY, GASTROSCOPY, DUODENOSCOPY (EGD), COMBINED N/A 11/02/2021    Procedure: ESOPHAGOGASTRODUODENOSCOPY (EGD);  Surgeon: Daniel Ortiz MD;  Location:  GI     ESOPHAGOSCOPY, GASTROSCOPY, DUODENOSCOPY (EGD), COMBINED N/A 11/5/2021    Procedure: ESOPHAGOGASTRODUODENOSCOPY (EGD);  Surgeon: Ronnell Hernandez MD;  Location:  GI     EXTRACTION(S) DENTAL N/A 09/22/2021    Procedure: EXTRACTION tooth #19;  Surgeon: Deepak Tobin DDS;  Location: [facility] OR     HERNIA REPAIR       IR CHEST TUBE PLACEMENT NON-TUNNELLED LEFT  11/03/2021     PICC TRIPLE LUMEN PLACEMENT Left 11/04/2021    5FR TL PICC. Right non occlusive thrombus subclavian vein.     REPLACE GASTROJEJUNOSTOMY TUBE, PERCUTANEOUS N/A 11/9/2021    Procedure: REPLACEMENT, GASTROJEJUNOSTOMY TUBE, PERCUTANEOUS;  Surgeon: Hernando Rodriguez MD;  Location: [facility] GI     right acl       TRACHEOSTOMY PERCUTANEOUS N/A 10/29/2021    Procedure: Percutaneous Tracheostomy,;  Surgeon: Celine Jenkins MD;  Location: [facility] OR     TRANSPLANT LUNG RECIPIENT SINGLE X2 Bilateral 10/16/2021    Procedure: TRANSPLANT, LUNG, RECIPIENT, BILATERAL, Bronchoscopy, on-pump perfusion, bilateral clamshell sternotomy;  Surgeon: Yanick Corral MD;  Location: [facility] OR     XR ACROMIOCLAVICULAR JOINT BILATERAL              Family History:     Family History   Problem Relation Age of Onset     Diabetes Type 1 Mother      Heart Disease Mother      Chronic Obstructive Pulmonary Disease Mother      Rheumatoid Arthritis Father      Emphysema Paternal Grandfather             Social History:     Social History     Socioeconomic History     Marital status: Single     Spouse name: Not on file     Number of children: Not on file     Years of education: Not on file     Highest education level: Not on file   Occupational History     Not on file   Tobacco Use     Smoking status: Never Smoker     Smokeless tobacco: Never Used   Substance and Sexual Activity     Alcohol use: Never     Drug use: Never     Sexual activity: Not on file   Other Topics Concern     Parent/sibling w/ CABG, MI or angioplasty before 65F 55M? Not Asked   Social History Narrative     Not on file     Social Determinants of Health     Financial Resource Strain: Not on file   Food Insecurity: Not on file   Transportation Needs: Not on file   Physical Activity: Not on file   Stress: Not on file   Social Connections: Not on file   Intimate Partner Violence: Not on file   Housing Stability: Not on file            Medications:     Current Outpatient Medications   Medication     acetaminophen (TYLENOL) 325 MG tablet     amLODIPine (NORVASC) 5 MG tablet     calcium carbonate 600 mg-vitamin D 400 units (CALTRATE) 600-400 MG-UNIT per tablet     escitalopram (LEXAPRO) 20 MG tablet     fluconazole (DIFLUCAN) 400-0.9 MG/200ML-% infusion     fluticasone (FLONASE) 50 MCG/ACT nasal spray     folic acid-vit B6-vit B12 (FOLGARD) 0.8-10-0.115 MG TABS per tablet     furosemide (LASIX) 40 MG tablet     hydrOXYzine (ATARAX) 25 MG tablet     insulin glargine (LANTUS PEN) 100 UNIT/ML pen     levalbuterol (XOPENEX HFA) 45 MCG/ACT inhaler     levofloxacin (LEVAQUIN) 500 MG tablet     levothyroxine (SYNTHROID/LEVOTHROID) 25 MCG tablet     magnesium oxide (MAG-OX) 400 MG tablet     melatonin 10  MG TABS tablet     multivitamin w/minerals (THERA-VIT-M) tablet     mycophenolate (GENERIC EQUIVALENT) 250 MG capsule     nystatin (MYCOSTATIN) 805531 UNIT/ML suspension     pantoprazole (PROTONIX) 40 MG EC tablet     predniSONE (DELTASONE) 5 MG tablet     rosuvastatin (CRESTOR) 10 MG tablet     sulfamethoxazole-trimethoprim (BACTRIM) 400-80 MG tablet     tacrolimus (GENERIC EQUIVALENT) 1 MG capsule     traZODone (DESYREL) 50 MG tablet     warfarin ANTICOAGULANT (COUMADIN) 1 MG tablet     warfarin ANTICOAGULANT (COUMADIN) 1 MG tablet     No current facility-administered medications for this visit.            Physical Exam:   BP (!) 128/91   Pulse 114   SpO2 95%     GENERAL: alert, NAD  HEENT: NCAT, EOMI, no scleral icterus, oral mucosa moist and without lesions  Neck: no cervical or supraclavicular adenopathy  Lungs: moderate airflow, decreased in bases  CV: RRR, S1S2, no murmurs noted  Abdomen: normoactive BS, soft, mild periumbilical tenderness  Lymph: no edema  Neuro: AAO X 3, CN 2-12 grossly intact  Psychiatric: normal affect, good eye contact  Skin: no rash, jaundice or lesions on limited exam         Data:   All laboratory and imaging data reviewed.      Recent Results (from the past 168 hour(s))   Glucose by meter    Collection Time: 12/27/21 12:07 PM   Result Value Ref Range    GLUCOSE BY METER POCT 162 (H) 70 - 99 mg/dL   Glucose by meter    Collection Time: 12/27/21  5:17 PM   Result Value Ref Range    GLUCOSE BY METER POCT 140 (H) 70 - 99 mg/dL   Glucose by meter    Collection Time: 12/27/21  9:38 PM   Result Value Ref Range    GLUCOSE BY METER POCT 118 (H) 70 - 99 mg/dL   Glucose by meter    Collection Time: 12/28/21  2:31 AM   Result Value Ref Range    GLUCOSE BY METER POCT 136 (H) 70 - 99 mg/dL   INR    Collection Time: 12/28/21  7:27 AM   Result Value Ref Range    INR 2.82 (H) 0.86 - 1.14   Extra Green Top (Lithium Heparin) Tube    Collection Time: 12/28/21  7:27 AM   Result Value Ref Range    Hold  Specimen JIC    Extra Purple Top Tube    Collection Time: 12/28/21  7:27 AM   Result Value Ref Range    Hold Specimen JIC    Glucose by meter    Collection Time: 12/28/21  8:22 AM   Result Value Ref Range    GLUCOSE BY METER POCT 118 (H) 70 - 99 mg/dL   Glucose by meter    Collection Time: 12/28/21 12:21 PM   Result Value Ref Range    GLUCOSE BY METER POCT 110 (H) 70 - 99 mg/dL   Glucose by meter    Collection Time: 12/28/21  5:07 PM   Result Value Ref Range    GLUCOSE BY METER POCT 144 (H) 70 - 99 mg/dL   Glucose by meter    Collection Time: 12/28/21  9:08 PM   Result Value Ref Range    GLUCOSE BY METER POCT 91 70 - 99 mg/dL   Glucose by meter    Collection Time: 12/29/21  8:19 AM   Result Value Ref Range    GLUCOSE BY METER POCT 86 70 - 99 mg/dL   Glucose by meter    Collection Time: 12/29/21 12:25 PM   Result Value Ref Range    GLUCOSE BY METER POCT 99 70 - 99 mg/dL   Glucose by meter    Collection Time: 12/29/21  5:43 PM   Result Value Ref Range    GLUCOSE BY METER POCT 115 (H) 70 - 99 mg/dL   Glucose by meter    Collection Time: 12/29/21  9:35 PM   Result Value Ref Range    GLUCOSE BY METER POCT 128 (H) 70 - 99 mg/dL   Tacrolimus by Tandem Mass Spectrometry    Collection Time: 12/30/21  7:30 AM   Result Value Ref Range    Tacrolimus by Tandem Mass Spectrometry 14.1 5.0 - 15.0 ug/L    Tacrolimus Last Dose Date      Tacrolimus Last Dose Time     INR    Collection Time: 12/30/21  7:30 AM   Result Value Ref Range    INR 2.79 (H) 0.86 - 1.14   CBC with platelets    Collection Time: 12/30/21  7:30 AM   Result Value Ref Range    WBC Count 11.1 (H) 4.0 - 11.0 10e3/uL    RBC Count 4.23 (L) 4.40 - 5.90 10e6/uL    Hemoglobin 11.5 (L) 13.3 - 17.7 g/dL    Hematocrit 36.4 (L) 40.0 - 53.0 %    MCV 86 78 - 100 fL    MCH 27.2 26.5 - 33.0 pg    MCHC 31.6 31.5 - 36.5 g/dL    RDW 14.0 10.0 - 15.0 %    Platelet Count 197 150 - 450 10e3/uL   Basic metabolic panel    Collection Time: 12/30/21  7:30 AM   Result Value Ref Range     Sodium 139 133 - 144 mmol/L    Potassium 4.6 3.4 - 5.3 mmol/L    Chloride 105 94 - 109 mmol/L    Carbon Dioxide (CO2) 27 20 - 32 mmol/L    Anion Gap 7 3 - 14 mmol/L    Urea Nitrogen 24 7 - 30 mg/dL    Creatinine 1.00 0.66 - 1.25 mg/dL    Calcium 9.7 8.5 - 10.1 mg/dL    Glucose 100 (H) 70 - 99 mg/dL    GFR Estimate 88 >60 mL/min/1.73m2   Magnesium    Collection Time: 12/30/21  7:30 AM   Result Value Ref Range    Magnesium 1.5 (L) 1.6 - 2.3 mg/dL   IgG    Collection Time: 12/30/21  7:30 AM   Result Value Ref Range    Immunoglobulin G 446 (L) 610-1,616 mg/dL   Glucose by meter    Collection Time: 12/30/21  8:11 AM   Result Value Ref Range    GLUCOSE BY METER POCT 100 (H) 70 - 99 mg/dL   Basic metabolic panel    Collection Time: 01/03/22  7:43 AM   Result Value Ref Range    Sodium 141 133 - 144 mmol/L    Potassium 4.4 3.4 - 5.3 mmol/L    Chloride 105 94 - 109 mmol/L    Carbon Dioxide (CO2) 22 20 - 32 mmol/L    Anion Gap 14 3 - 14 mmol/L    Urea Nitrogen 39 (H) 7 - 30 mg/dL    Creatinine 1.79 (H) 0.66 - 1.25 mg/dL    Calcium 10.2 (H) 8.5 - 10.1 mg/dL    Glucose 114 (H) 70 - 99 mg/dL    GFR Estimate 44 (L) >60 mL/min/1.73m2   Magnesium    Collection Time: 01/03/22  7:43 AM   Result Value Ref Range    Magnesium 1.4 (L) 1.6 - 2.3 mg/dL   CBC with platelets    Collection Time: 01/03/22  7:43 AM   Result Value Ref Range    WBC Count 14.2 (H) 4.0 - 11.0 10e3/uL    RBC Count 5.26 4.40 - 5.90 10e6/uL    Hemoglobin 14.1 13.3 - 17.7 g/dL    Hematocrit 43.4 40.0 - 53.0 %    MCV 83 78 - 100 fL    MCH 26.8 26.5 - 33.0 pg    MCHC 32.5 31.5 - 36.5 g/dL    RDW 14.0 10.0 - 15.0 %    Platelet Count 237 150 - 450 10e3/uL   INR    Collection Time: 01/03/22  7:43 AM   Result Value Ref Range    INR 4.01 (H) 0.85 - 1.15   General PFT Lab (Please always keep checked)    Collection Time: 01/03/22  8:00 AM   Result Value Ref Range    FVC-Pred 3.98 L    FVC-Pre 2.11 L    FVC-%Pred-Pre 52 %    FEV1-Pre 1.27 L    FEV1-%Pred-Pre 40 %    FEV1FVC-Pred  79 %    FEV1FVC-Pre 60 %    FEFMax-Pred 8.37 L/sec    FEFMax-Pre 4.48 L/sec    FEFMax-%Pred-Pre 53 %    FEF2575-Pred 2.83 L/sec    FEF2575-Pre 0.62 L/sec    CCZ9462-%Pred-Pre 21 %    ExpTime-Pre 7.29 sec    FIFMax-Pre 4.70 L/sec    FEV1FEV6-Pred 80 %    FEV1FEV6-Pre 62 %     PFT interpretation:  Maneuver: valid and meets ATS guidelines  Severe obstruction with decreased FEV1/FVC and FEV1  The decrease in FVC may represent air trapping v. restrictive physiology.  Lung volumes would be necessary to determine.    Again, thank you for allowing me to participate in the care of your patient.      Sincerely,    Angela Luna PA-C

## 2022-01-03 NOTE — NURSING NOTE
"Transplant Coordinator Note    Reason for visit: Post lung transplant follow up visit   Coordinator: Present (Gisel in person)   Caregiver:  Mohini, pt's significant other    Health concerns addressed today:  1. N/V: started last evening; threw up once this AM  2. : small bowel movements daily  3. Respiratory: \"labored at times\" with activity; intermittent cough (not worse than last week)  4. Mood: \"feels okay,\" med management via pt's PCP    Activity/rehab: Starting pulmonary rehab 1/4  Oxygen needs: Room air  Pain management/RX: NA - PRN tylenol available  Diabetic management: On insulin; Managed by Michael Weldon - roopa 1/5  Next Bronch due:   High risk donor:   Risk Criteria Labs:   CMV status:  Valcyte stopped:   EBV status:  DVT/PE:  Post op AFIB/follow up with EP:  AC/asa: warfarin  PJP prophylactic: bactrim    COVID:  1. COVID-19 infection (yes/no, date of most recent positive test):   2. Status/instructions given about COVID-19 vaccine:     Pt Education: medications (use/dose/side effects), how/when to call coordinator, frequency of labs, s/s of infection/rejection, call prior to starting any new medications, lab/vital sign book    Health Maintenance:     Last colonoscopy:     Next colonoscopy due:     Dermatology:    Vaccinations this visit:     Labs, CXR, PFTs reviewed with patient  Medication record reviewed and reconciled  Questions and concerns addressed    Patient Instructions  1. Hydrate with 70-80 oz fluids daily.  2. Continue to exercise daily or most days of the week.  3. Follow up with your primary care provider for annual gender health maintenance procedures.  4. Follow up with colonoscopy schedule.  5. Follow up with annual dermatology visits.  6. Take your afternoon prednisone at 4:30 PM.  7. Bring your lab book and medication card to each clinic visit.  8. Increase your flonase to twice a day to see if that helps clear up some congestion.  9. Don't take your lasix on Tuesday or Wednesday.  " Decrease furosemide (lasix) to 20 mg on Thursday.  10. Decrease your insuiln glargine (lantus) to 5 units.  11. Get an abdominal x-ray today.  12. Get 1 L of IV fluids today and tomorrow.  13. Get 2 grams of IV magnesium at [facility] today at 10 AM.    Next transplant clinic appointment:  Friday, 1/7, with CXR and labs with Dr. Johns  Next lab draw: 1/7      AVS printed at time of check out

## 2022-01-03 NOTE — PROGRESS NOTES
ANTICOAGULATION MANAGEMENT     Edson Thornton 56 year old male is on warfarin with supratherapeutic INR result. (Goal INR 2.0-3.0)    Recent labs: (last 7 days)     01/03/22  0743   INR 4.01*       ASSESSMENT     Source(s): Patient/Caregiver Call     Warfarin doses taken: Warfarin taken as instructed  Diet: No new diet changes identified  New illness, injury, or hospitalization: Yes: Recently discharged after having a lung transplant   Medication/supplement changes: None noted Pt continues to take Bactrim/Nystatin/Prednisone/Diflucan/Levaquin  Signs or symptoms of bleeding or clotting: No  Previous INR: Therapeutic last 2(+) visits  Additional findings: Pt believes he has labs on Friday 1/7     PLAN     Recommended plan for no diet, medication or health factor changes affecting INR     Dosing Instructions: Hold dose then Decrease your warfarin dose (21.4% change) with next INR in 4 days       Summary  As of 1/3/2022    Full warfarin instructions:  1/3: Hold; 1/5: 1 mg; Otherwise 1 mg every Tue, Thu, Sat; 2 mg all other days   Next INR check:  1/6/2022             Telephone call with Edson who verbalizes understanding and agrees to plan and who agrees to plan and repeated back plan correctly    Orders given to  Homecare nurse/facility to recheck    Education provided: None required    Plan made with [facility] Pharmacist Usha Choi, TIFFANY  Anticoagulation Clinic  1/3/2022    _______________________________________________________________________     Anticoagulation Episode Summary     Current INR goal:  2.0-3.0   TTR:  --   Target end date:  Indefinite   Send INR reminders to:  [facility]    Indications    Atrial fibrillation  unspecified type (H) [I48.91]           Comments:  [facility]   Transplant Labs twice a week  Staying Local: [facility] then back to [place]         Anticoagulation Care Providers     Provider Role Specialty Phone number    Abiodun Woods MD  Referring Pulmonary Disease [phone]

## 2022-01-03 NOTE — PATIENT INSTRUCTIONS
Patient Instructions  1. Hydrate with 70-80 oz fluids daily.  2. Continue to exercise daily or most days of the week.  3. Follow up with your primary care provider for annual gender health maintenance procedures.  4. Follow up with colonoscopy schedule.  5. Follow up with annual dermatology visits.  6. Take your afternoon prednisone at 4:30 PM.  7. Bring your lab book and medication card to each clinic visit.  8. Increase your flonase to twice a day to see if that helps clear up some congestion.  9. Don't take your lasix on Tuesday or Wednesday.  Decrease furosemide (lasix) to 20 mg on Thursday.  10. Decrease your insuiln glargine (lantus) to 5 units.  11. Get an abdominal x-ray today.  12. Get 1 L of IV fluids today and tomorrow.  13. Get 2 grams of IV magnesium at [facility] today at 10 AM.    Next transplant clinic appointment:  Friday, 1/7, with CXR and labs with Dr. Johns  Next lab draw: 1/7    ~~~~~~~~~~~~~~~~~~~~~~~~~    Thoracic Transplant Office phone [phone], fax [phone]    Office Hours 8:30 - 5:00     For after-hours urgent issues, please dial [phone], and ask to speak with the Thoracic Transplant Coordinator On-Call.  --------------------  To expedite your medication refill(s), please contact your pharmacy and have them fax a refill request to: [phone]  .   *Please allow 3 business days for routine medication refills.  *Please allow 5 business days for controlled substance medication refills.    **For Diabetic medications and supplies refill(s), please contact your pharmacy and have them contact your Endocrine team.  --------------------  For scheduling appointments call [phone].  --------------------  Please Note: If you are active on [employer], all future test results will be sent by [employer] message only, and will no longer be called to patient. You may also receive communication directly from your physician.

## 2022-01-03 NOTE — PATIENT INSTRUCTIONS
Dear Edson Thornton    Thank you for choosing [facility] ([facility]) for your infusion.  The following information is a summary of our appointment as well as important reminders.      We look forward in seeing you on your next appointment here at [facility] ([facility]).  Please don t hesitate to call us at [phone] to reschedule any of your appointments or to speak with one of the [facility] registered nurses.  It was a pleasure taking care of you today.    Sincerely,    [facility]  [address] [place] [address]  [id number]  Phone:  [phone]  Patient Education     Magnesium Sulfate Injection 500 mg/mL  Uses  This medicine is used for the following purposes:    irregular heart beat    low magnesium levels    pregnancy problem  Instructions  Always inspect the medicine before using.  Check the medicine before each use. If the liquid medicine has any particles in it, appears discolored, or if the vial appears damaged, do not use it.  Keep this medicine at room temperature.  Please tell your doctor and pharmacist about all the medicines you take. Include both prescription and over-the-counter medicines. Also tell them about any vitamins, herbal medicines, or anything else you take for your health.  It is very important that you follow your doctor's instructions for all blood tests.  It is very important that you keep all appointments for medical exams and tests while on this medicine.  Cautions  Tell your doctor and pharmacist if you ever had an allergic reaction to a medicine. Symptoms of an allergic reaction can include trouble breathing, skin rash, itching, swelling, or severe dizziness.  Some patients taking this medicine have experienced serious side effects. Please speak with your doctor to understand the risks and benefits associated with this medicine.  Tell  the doctor or pharmacist if you are pregnant, planning to be pregnant, or breastfeeding.  Ask your pharmacist if this medicine can interact with any of your other medicines. Be sure to tell them about all the medicines you take.  Side Effects  The following is a list of some common side effects from this medicine. Please speak with your doctor about what you should do if you experience these or other side effects.    diarrhea    change in the amount of fat under the skin around the injection site    pain near injection site  Call your doctor or get medical help right away if you notice any of these more serious side effects:    irregular heart beat    confusion    dizziness    drowsiness or sedation    fainting    feeling of heat or flushing    slow heartbeat    low blood pressure    tight or rigid muscles    muscle weakness    shortness of breath    unusual or unexplained tiredness or weakness  A few people may have an allergic reactions to this medicine. Symptoms can include difficulty breathing, skin rash, itching, swelling, or severe dizziness. If you notice any of these symptoms, seek medical help quickly.  Extra  Please speak with your doctor, nurse, or pharmacist if you have any questions about this medicine.  [email or web address]  IMPORTANT NOTE: This document tells you briefly how to take your medicine, but it does not tell you all there is to know about it.Your doctor or pharmacist may give you other documents about your medicine. Please talk to them if you have any questions.Always follow their advice. There is a more complete description of this medicine available in English.Scan this code on your smartphone or tablet or use the web address below. You can also ask your pharmacist for a printout. If you have any questions, please ask your pharmacist.     2021 [employer].

## 2022-01-03 NOTE — TELEPHONE ENCOUNTER
Tacrolimus level 16.2 at 13.5 hours, on 1/3/22.  Goal 8-12.   Current dose 2 mg in AM, 1 mg in PM    Dose changed to 1 mg in AM, 1 mg in PM   Recheck level in 5-7 days    Discussed with Bret and patient agreed with plan. Will check another Tacrolimus level on Friday 1/7 at appointment.     Patient states he hasn't had any more vomiting today. Abdominal Xray normal. Advised patient to call if vomiting continues and/or cannot keep IMS medications down.

## 2022-01-03 NOTE — PROGRESS NOTES
Infusion Nursing Note:  Edson Thornton presents today for IVF/Mag.    Patient seen by provider today: Yes: in clinic   present during visit today: Not Applicable.    Note:   -1 L of NS infused over 1 hr  -2g of magnesium infused over 1 hr  -Will be back tomorrow for fluids only    Intravenous Access:  PICC.    Treatment Conditions:  Not Applicable.    Post Infusion Assessment:  Patient tolerated infusion without incident.  Blood return noted pre and post infusion.  Site patent and intact, free from redness, edema or discomfort.  No evidence of extravasations.     Discharge Plan:   [facility]  Patient will return 1/4/22 for next appointment.   Patient discharged in stable condition accompanied by: self.  Departure Mode: Wheelchair.    Autumn Peng, TIFFANY    Administrations This Visit     0.9% sodium chloride BOLUS     Admin Date  01/03/2022 Action  New Bag Dose  1,000 mL Rate  1,000 mL/hr Route  Intravenous Administered By  Autumn Peng, RN          magnesium sulfate 2 g in water intermittent infusion     Admin Date  01/03/2022 Action  New Bag Dose  2 g Rate  50 mL/hr Route  Intravenous Administered By  Autumn Peng, RN

## 2022-01-03 NOTE — NURSING NOTE
Chief Complaint   Patient presents with     Follow Up     ltx f/u     Vitals were taken and medications were reconciled.     SAURABH Morillo

## 2022-01-03 NOTE — LETTER
Date:January 3, 2022      Patient was self referred, no letter generated. Do not send.        [facility] Health Information

## 2022-01-04 ENCOUNTER — HOSPITAL ENCOUNTER (OUTPATIENT)
Dept: CARDIAC REHAB | Facility: CLINIC | Age: 57
End: 2022-01-04
Attending: INTERNAL MEDICINE
Payer: COMMERCIAL

## 2022-01-04 ENCOUNTER — INFUSION THERAPY VISIT (OUTPATIENT)
Dept: INFUSION THERAPY | Facility: CLINIC | Age: 57
End: 2022-01-04
Attending: PHYSICIAN ASSISTANT
Payer: COMMERCIAL

## 2022-01-04 VITALS
TEMPERATURE: 98.5 F | DIASTOLIC BLOOD PRESSURE: 86 MMHG | SYSTOLIC BLOOD PRESSURE: 142 MMHG | OXYGEN SATURATION: 95 % | HEART RATE: 90 BPM

## 2022-01-04 DIAGNOSIS — Z94.2 S/P LUNG TRANSPLANT (H): ICD-10-CM

## 2022-01-04 DIAGNOSIS — Z94.2 LUNG REPLACED BY TRANSPLANT (H): ICD-10-CM

## 2022-01-04 DIAGNOSIS — N17.9 AKI (ACUTE KIDNEY INJURY) (H): Primary | ICD-10-CM

## 2022-01-04 LAB
DONOR IDENTIFICATION: NORMAL
DQB5: 3072
DSA COMMENTS: NORMAL
DSA PRESENT: YES
DSA TEST METHOD: NORMAL
ORGAN: NORMAL
SA 1 CELL: NORMAL
SA 1 TEST METHOD: NORMAL
SA 2 CELL: NORMAL
SA 2 TEST METHOD: NORMAL
SA1 HI RISK ABY: NORMAL
SA1 MOD RISK ABY: NORMAL
SA2 HI RISK ABY: NORMAL
SA2 MOD RISK ABY: NORMAL
UNACCEPTABLE ANTIGENS: NORMAL
UNOS CPRA: 26
ZZZSA 1  COMMENTS: NORMAL
ZZZSA 2 COMMENTS: NORMAL

## 2022-01-04 PROCEDURE — G0237 THERAPEUTIC PROCD STRG ENDUR: HCPCS

## 2022-01-04 PROCEDURE — 250N000011 HC RX IP 250 OP 636: Performed by: PHYSICIAN ASSISTANT

## 2022-01-04 PROCEDURE — 258N000003 HC RX IP 258 OP 636: Performed by: PHYSICIAN ASSISTANT

## 2022-01-04 PROCEDURE — 96360 HYDRATION IV INFUSION INIT: CPT

## 2022-01-04 PROCEDURE — G0238 OTH RESP PROC, INDIV: HCPCS

## 2022-01-04 RX ORDER — HEPARIN SODIUM,PORCINE 10 UNIT/ML
5 VIAL (ML) INTRAVENOUS
Status: CANCELLED
Start: 2022-01-04

## 2022-01-04 RX ORDER — HEPARIN SODIUM,PORCINE 10 UNIT/ML
5 VIAL (ML) INTRAVENOUS
Status: DISCONTINUED | OUTPATIENT
Start: 2022-01-04 | End: 2022-01-04 | Stop reason: HOSPADM

## 2022-01-04 RX ADMIN — SODIUM CHLORIDE 1000 ML: 9 INJECTION, SOLUTION INTRAVENOUS at 11:41

## 2022-01-04 RX ADMIN — Medication 5 ML: at 11:44

## 2022-01-04 RX ADMIN — Medication 5 ML: at 11:43

## 2022-01-04 RX ADMIN — Medication 5 ML: at 12:49

## 2022-01-04 NOTE — PROGRESS NOTES
Nursing Note  Edson Thornton [facility] for:   Chief Complaint   Patient presents with     Infusion     IVF     During today's [facility] appointment, orders from Angela Luna were completed.  Frequency: as needed    Progress note:  Patient identification verified by name and date of birth.  Assessment completed.  Vitals recorded in Doc Flowsheets.  Patient was provided with education regarding medication/procedure and possible side effects.  Patient verbalized understanding.     present during visit today: Not Applicable.    Treatment Conditions: Non-applicable.    Premedications: were not ordered.    Drug Waste Record: No    Infusion length and rate:  infusion given over approximately 1 hours  999 ml/hr    Labs: were not ordered for this appointment.    Vascular access: PICC accessed today. PICC dressing changed.     Is the next appt scheduled? As needed   Asymptomatic COVID test completed? no    Post Infusion Assessment:  Patient tolerated infusion without incident.     Discharge Plan:   Follow up plan of care with: primary care provider, and transplant coordinator.  Discharge instructions were reviewed with patient.  Patient/representative verbalized understanding of discharge instructions and all questions answered.  Patient discharged [facility] in stable condition.    Usha Yun RN    Administrations This Visit     0.9% sodium chloride BOLUS     Admin Date  01/04/2022 Action  New Bag Dose  1,000 mL Rate  1,000 mL/hr Route  Intravenous Administered By  Usha Yun RN          heparin lock flush 10 UNIT/ML injection 5 mL     Admin Date  01/04/2022 Action  Given Dose  5 mL Route  Intracatheter Administered By  Usha Yun RN           Admin Date  01/04/2022 Action  Given Dose  5 mL Route  Intracatheter Administered By  Usha Yun RN                /77   Pulse 117   Temp 98.5   F (36.9  C) (Oral)   SpO2 95%

## 2022-01-04 NOTE — LETTER
1/4/2022         RE: Edson Thornton  [address]  [place] [address]        Dear Colleague,    Thank you for referring your patient, Edson Thornton, to the [facility]. Please see a copy of my visit note below.    Nursing Note  Edson Thornton [facility] for:   Chief Complaint   Patient presents with     Infusion     IVF     During today's [facility] appointment, orders from Angela Luna were completed.  Frequency: as needed    Progress note:  Patient identification verified by name and date of birth.  Assessment completed.  Vitals recorded in Doc Flowsheets.  Patient was provided with education regarding medication/procedure and possible side effects.  Patient verbalized understanding.     present during visit today: Not Applicable.    Treatment Conditions: Non-applicable.    Premedications: were not ordered.    Drug Waste Record: No    Infusion length and rate:  infusion given over approximately 1 hours  999 ml/hr    Labs: were not ordered for this appointment.    Vascular access: PICC accessed today. PICC dressing changed.     Is the next appt scheduled? As needed   Asymptomatic COVID test completed? no    Post Infusion Assessment:  Patient tolerated infusion without incident.     Discharge Plan:   Follow up plan of care with: primary care provider, and transplant coordinator.  Discharge instructions were reviewed with patient.  Patient/representative verbalized understanding of discharge instructions and all questions answered.  Patient discharged from [facility] in stable condition.    Usha Yun RN    Administrations This Visit     0.9% sodium chloride BOLUS     Admin Date  01/04/2022 Action  New Bag Dose  1,000 mL Rate  1,000 mL/hr Route  Intravenous Administered By  Usha Yun, TIFFANY          heparin lock flush 10 UNIT/ML injection  5 mL     Admin Date  01/04/2022 Action  Given Dose  5 mL Route  Intracatheter Administered By  Usha Yun, TIFFANY           Admin Date  01/04/2022 Action  Given Dose  5 mL Route  Intracatheter Administered By  Usha Yun, RN                /77   Pulse 117   Temp 98.5  F (36.9  C) (Oral)   SpO2 95%           Again, thank you for allowing me to participate in the care of your patient.      Sincerely,    [facility]

## 2022-01-05 ENCOUNTER — ANTICOAGULATION THERAPY VISIT (OUTPATIENT)
Dept: ANTICOAGULATION | Facility: CLINIC | Age: 57
End: 2022-01-05

## 2022-01-05 ENCOUNTER — LAB REQUISITION (OUTPATIENT)
Dept: LAB | Facility: CLINIC | Age: 57
End: 2022-01-05
Payer: COMMERCIAL

## 2022-01-05 ENCOUNTER — TELEPHONE (OUTPATIENT)
Dept: TRANSPLANT | Facility: CLINIC | Age: 57
End: 2022-01-05

## 2022-01-05 DIAGNOSIS — Z79.899 ENCOUNTER FOR LONG-TERM (CURRENT) USE OF HIGH-RISK MEDICATION: ICD-10-CM

## 2022-01-05 DIAGNOSIS — D84.9 IMMUNOSUPPRESSED STATUS (H): ICD-10-CM

## 2022-01-05 DIAGNOSIS — I48.91 ATRIAL FIBRILLATION, UNSPECIFIED TYPE (H): Primary | ICD-10-CM

## 2022-01-05 DIAGNOSIS — Z94.2 LUNG TRANSPLANT STATUS (H): ICD-10-CM

## 2022-01-05 DIAGNOSIS — Z94.2 LUNG REPLACED BY TRANSPLANT (H): Primary | ICD-10-CM

## 2022-01-05 DIAGNOSIS — R11.0 NAUSEA: ICD-10-CM

## 2022-01-05 LAB
ANION GAP SERPL CALCULATED.3IONS-SCNC: 9 MMOL/L (ref 3–14)
BASOPHILS # BLD AUTO: 0.1 10E3/UL (ref 0–0.2)
BASOPHILS NFR BLD AUTO: 0 %
BUN SERPL-MCNC: 27 MG/DL (ref 7–30)
CALCIUM SERPL-MCNC: 9.6 MG/DL (ref 8.5–10.1)
CHLORIDE BLD-SCNC: 104 MMOL/L (ref 94–109)
CO2 SERPL-SCNC: 27 MMOL/L (ref 20–32)
CREAT SERPL-MCNC: 1.45 MG/DL (ref 0.66–1.25)
EOSINOPHIL # BLD AUTO: 0 10E3/UL (ref 0–0.7)
EOSINOPHIL NFR BLD AUTO: 0 %
ERYTHROCYTE [DISTWIDTH] IN BLOOD BY AUTOMATED COUNT: 14.3 % (ref 10–15)
GFR SERPL CREATININE-BSD FRML MDRD: 57 ML/MIN/1.73M2
GLUCOSE BLD-MCNC: 86 MG/DL (ref 70–99)
HCT VFR BLD AUTO: 38.6 % (ref 40–53)
HGB BLD-MCNC: 12.4 G/DL (ref 13.3–17.7)
IMM GRANULOCYTES # BLD: 0.2 10E3/UL
IMM GRANULOCYTES NFR BLD: 2 %
INR PPP: 4.85 (ref 0.85–1.15)
LYMPHOCYTES # BLD AUTO: 0.9 10E3/UL (ref 0.8–5.3)
LYMPHOCYTES NFR BLD AUTO: 8 %
MAGNESIUM SERPL-MCNC: 1.4 MG/DL (ref 1.6–2.3)
MCH RBC QN AUTO: 27.1 PG (ref 26.5–33)
MCHC RBC AUTO-ENTMCNC: 32.1 G/DL (ref 31.5–36.5)
MCV RBC AUTO: 84 FL (ref 78–100)
MONOCYTES # BLD AUTO: 0.9 10E3/UL (ref 0–1.3)
MONOCYTES NFR BLD AUTO: 8 %
NEUTROPHILS # BLD AUTO: 9.2 10E3/UL (ref 1.6–8.3)
NEUTROPHILS NFR BLD AUTO: 82 %
NRBC # BLD AUTO: 0 10E3/UL
NRBC BLD AUTO-RTO: 0 /100
PHOSPHATE SERPL-MCNC: 2.4 MG/DL (ref 2.5–4.5)
PLATELET # BLD AUTO: 197 10E3/UL (ref 150–450)
POTASSIUM BLD-SCNC: 3.9 MMOL/L (ref 3.4–5.3)
RBC # BLD AUTO: 4.58 10E6/UL (ref 4.4–5.9)
SODIUM SERPL-SCNC: 140 MMOL/L (ref 133–144)
WBC # BLD AUTO: 11.4 10E3/UL (ref 4–11)

## 2022-01-05 PROCEDURE — 85610 PROTHROMBIN TIME: CPT | Performed by: INTERNAL MEDICINE

## 2022-01-05 PROCEDURE — 83735 ASSAY OF MAGNESIUM: CPT | Performed by: INTERNAL MEDICINE

## 2022-01-05 PROCEDURE — 80048 BASIC METABOLIC PNL TOTAL CA: CPT | Performed by: INTERNAL MEDICINE

## 2022-01-05 PROCEDURE — 80197 ASSAY OF TACROLIMUS: CPT | Performed by: INTERNAL MEDICINE

## 2022-01-05 PROCEDURE — 84100 ASSAY OF PHOSPHORUS: CPT | Performed by: INTERNAL MEDICINE

## 2022-01-05 PROCEDURE — 85025 COMPLETE CBC W/AUTO DIFF WBC: CPT | Performed by: INTERNAL MEDICINE

## 2022-01-05 RX ORDER — ONDANSETRON 4 MG/1
4 TABLET, ORALLY DISINTEGRATING ORAL EVERY 6 HOURS PRN
Qty: 30 TABLET | Refills: 3 | Status: SHIPPED | OUTPATIENT
Start: 2022-01-05 | End: 2022-03-24

## 2022-01-05 NOTE — TELEPHONE ENCOUNTER
Patient calls, has been having nausea since Sunday. No emesis but feels like always on the verge of wanting to throw up. Doesn't seem to correlate with eating, drinking or medications.   Patient had transplant appointment Monday, 1/3. Abdominal xray not significant, patient not constipated, having regular BMs.     Discussed with Dr. Woosd:  - Rx for zofran  - if nausea doesn't improve with zofran, consider compazine or gastric emtpying test.     Patient had labs drawn today with Home care.   Discussed patient's creatinine with Angela Luna:  - patient to increase fluids to 70oz daily.   - continue holding lasix tomorrow and Friday  - labs recheck Friday before appt with Dr. Johns.     Patient verbalized understanding and agreement of plan. Will call back with questions, concerns, updates.

## 2022-01-05 NOTE — PROGRESS NOTES
ANTICOAGULATION MANAGEMENT     Edson Thornton 56 year old male is on warfarin with supratherapeutic INR result. (Goal INR 2.0-3.0)    Recent labs: (last 7 days)     01/05/22  1030   INR 4.85*       ASSESSMENT     Source(s): Chart Review and Patient/Caregiver Call     Warfarin doses taken: Warfarin taken as instructed  Diet: No new diet changes identified--Bret reports he is not eating very much--he has had a lot of nausea, vomited x 2.  He was prescribed zofran today  New illness, injury, or hospitalization: Yes: recent lung transplant 10/2021    Medication/supplement changes: tacrolimus dose decreased.  He is on bactrim, nystatin, prednisone, diflucan, levaquin  Signs or symptoms of bleeding or clotting: No  Previous INR: Supratherapeutic  Additional findings: None     PLAN     Recommended plan for ongoing change(s) affecting INR     Dosing Instructions: Hold 2 doses then continue your current warfarin dose with next INR in 2 days       Summary  As of 1/5/2022    Full warfarin instructions:  1/5: Hold; 1/6: Hold; Otherwise 1 mg every Tue, Thu, Sat; 2 mg all other days   Next INR check:  1/7/2022             Telephone call with Edson who verbalizes understanding and agrees to plan    spoke with Autumn at [facility] and also left a message for their RN scheduling an INR on Friday.  Autumn verified that Bret does have a home care visit scheduled on 1/7/22    Education provided: Please call back if any changes to your diet, medications or how you've been taking warfarin, Monitoring for bleeding signs and symptoms, When to seek medical attention/emergency care and Contact [phone] with any changes, questions or concerns.     Plan made with [facility] Pharmacist Usha Chau, RN  Anticoagulation Clinic  1/5/2022    _______________________________________________________________________     Anticoagulation Episode Summary     Current INR goal:  2.0-3.0   TTR:  --   Target end date:  Indefinite    Send INR reminders to:  [facility]    Indications    Atrial fibrillation  unspecified type (H) [I48.91]           Comments:  [facility]   Transplant Labs twice a week  Staying Local: [facility] then back to [place]         Anticoagulation Care Providers     Provider Role Specialty Phone number    Abiodun Woods MD Referring Pulmonary Disease [phone]

## 2022-01-06 ENCOUNTER — TELEPHONE (OUTPATIENT)
Dept: TRANSPLANT | Facility: CLINIC | Age: 57
End: 2022-01-06
Payer: COMMERCIAL

## 2022-01-06 ENCOUNTER — VIRTUAL VISIT (OUTPATIENT)
Dept: PHARMACY | Facility: CLINIC | Age: 57
End: 2022-01-06
Payer: COMMERCIAL

## 2022-01-06 ENCOUNTER — HOME INFUSION (PRE-WILLOW HOME INFUSION) (OUTPATIENT)
Dept: PHARMACY | Facility: CLINIC | Age: 57
End: 2022-01-06
Payer: COMMERCIAL

## 2022-01-06 ENCOUNTER — TELEPHONE (OUTPATIENT)
Dept: PULMONOLOGY | Facility: CLINIC | Age: 57
End: 2022-01-06
Payer: COMMERCIAL

## 2022-01-06 ENCOUNTER — HOSPITAL ENCOUNTER (OUTPATIENT)
Dept: CARDIAC REHAB | Facility: CLINIC | Age: 57
End: 2022-01-06
Attending: INTERNAL MEDICINE
Payer: COMMERCIAL

## 2022-01-06 DIAGNOSIS — R11.0 NAUSEA: ICD-10-CM

## 2022-01-06 DIAGNOSIS — Z94.2 S/P LUNG TRANSPLANT (H): Primary | ICD-10-CM

## 2022-01-06 DIAGNOSIS — R73.9 HYPERGLYCEMIA: ICD-10-CM

## 2022-01-06 LAB
TACROLIMUS BLD-MCNC: 29.9 UG/L (ref 5–15)
TME LAST DOSE: ABNORMAL H
TME LAST DOSE: ABNORMAL H

## 2022-01-06 PROCEDURE — 99605 MTMS BY PHARM NP 15 MIN: CPT | Performed by: PHARMACIST

## 2022-01-06 PROCEDURE — 99607 MTMS BY PHARM ADDL 15 MIN: CPT | Performed by: PHARMACIST

## 2022-01-06 PROCEDURE — G0238 OTH RESP PROC, INDIV: HCPCS

## 2022-01-06 NOTE — TELEPHONE ENCOUNTER
DATE:  1/6/2022     TIME OF RECEIPT FROM LAB:0810    LAB TEST:  Tac level    LAB VALUE: 33.1    RESULTS GIVEN WITH READ-BACK TO:Mady Marin    TIME LAB VALUE REPORTED TO PROVIDER:0811  Phone #[phone]

## 2022-01-06 NOTE — TELEPHONE ENCOUNTER
[facility] Call Center    Phone Message    May a detailed message be left on voicemail: no     Reason for Call: Other: Marlin from [facility] would like a call back for verbal orders for PT 2x a week for 3 weeks and 1x a week for 1 week . Please reach out to discuss     Action Taken: Message routed to:  [facility] Pulm    Travel Screening: Not Applicable

## 2022-01-06 NOTE — PROGRESS NOTES
Medication Therapy Management (MTM) Encounter    ASSESSMENT:                            Medication Adherence/Access: patient had some confusion about which medications he was actually taking at what times. Would benefit from an in person review and pill box set up education.     Lung Transplant: Patient to take Anti-rejection medications (Mycophenolate Mofetil and Tacrolimus) at 7:45AM and 8PM. We prefer these meds to be taken 12 hours apart. Patient has been taking Tacrolimus 2mg every morning and 1 mg every evening instead of the 1mg twice daily listed in EMR.  Mady, RN coordinator, would like patient to continue this dose for now as patient is getting a level drawn tomorrow.    Nausea: Stable. Nausea has improved over the past few days with Ondansetron treatment.     Hyperglycemia:  Stable. BG at goal  per patient.     PLAN:                            1. Take Mycophenolate Mofetil and Tacrolimus at 7:45 am and 8 pm, 12 hours apart.   2. Bring in pill box, medications, blood sugar monitor, and med card for our follow up appointment next Tuesday at 1 PM.   3. Continue Tacrolimus 2mg every morning and 1mg every evening.    Follow-up: Tuesday at 1 PM    SUBJECTIVE/OBJECTIVE:                          Edson Thornton is a 56 year old male called for a transitions of care visit. He was discharged from rehab/ [facility] on 12/30 for lung txp rehab.      Reason for visit: Initial post transplant.    Allergies/ADRs: Reviewed in chart  Past Medical History: Reviewed in chart  Tobacco: He reports that he has never smoked. He has never used smokeless tobacco.  Alcohol: not currently using    Medication Adherence/Access: Patient uses pill box(es) and has assistance with medication administration: jarret Ruvalcaba.  Patient takes medications 4 time(s) per day. 7:45AM, 4:30PM, 6PM, 8PM  Per patient, misses medication 4 times per week.   The patient fills medications at [facility]: NO, fills medications at local pharmacy.    Lung  Transplant:  Current immunosuppressants include Mycophenolate Mofetil 1000mg twice daily,TAC 2mg every morning and 1mg every evening, prednisone 10mg every morning and 7.5mg every evening.  Pt reports Nausea  Transplant date: 10/16/21  Estimated Creatinine Clearance: 53.6 mL/min (A) (based on SCr of 1.45 mg/dL (H)).   CMV prophylaxis:  Donor (-), Recipient (-), no prophylaxis  PCP prophylaxis: Bactrim S S daily  Thrush prophylaxis:Nystatin 6 months post tx  PPI use: Pantoprazole 40mg twice daily.   Supplements: Mag Oxide 800mg twice daily ( 2 hours from MMF) .  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   Immunizations: annual flu shot unknown; Cfsfpbyml28:  unknown; Prevnar 13: unknown; TDaP:  2018; Shingrix: 2018, 2019, HBV: unknown, COVID: unknown    Nausea: Pt is taking Ondansetron ODT 4mg as needed. Gets nauseous and has some vomiting every time he tries to eat. Started  night. Ondansetron works well for him.     Hyperglycemia:  Currently taking Lantus 5 unit(s) daily. Patient is not experiencing side effects.  Blood sugar monitorin time(s) daily. Ranges (patient reported):   100-120 per patient.   Symptoms of low blood sugar? none  Symptoms of high blood sugar? none  Urine Albumin: No results found for: UMALCR   Lab Results   Component Value Date    A1C 5.3 11/15/2021    A1C 6.6 2021    A1C 6.5 2021     Today's Vitals: There were no vitals taken for this visit.  ----------------  Post Discharge Medication Reconciliation Status: discharge medications reconciled and changed, per note/orders.    I spent 32 minutes with this patient today. I offer these suggestions for consideration by txp team. A copy of the visit note was provided to the patient's referring provider.    The patient was sent via [employer] a summary of these recommendations.     Michael Weldon, PharmD  [facility] Pharmacist    Phone:  [phone]     Telemedicine Visit Details  Type of service:  Telephone visit  Start Time: 12:39 PM  End Time: 1:11 PM  Originating Location (patient location): Home  Distant Location (provider location):  [facility]     Medication Therapy Recommendations  S/P lung transplant (H)    Current Medication: tacrolimus (GENERIC EQUIVALENT) 1 MG capsule   Rationale: Incorrect administration - Dosage too low - Effectiveness   Recommendation: Change Administration Time   Status: Patient Agreed - Adherence/Education

## 2022-01-06 NOTE — TELEPHONE ENCOUNTER
Critical high tac level from 1/5.   Called patient, states he took medication that AM before lab draw.   Recheck tomorrow in clinic.     Patient's nausea continues to be well controlled with Zofran.     Patient has no questions or concerns at this time. Will call with questions, concerns,updates.

## 2022-01-06 NOTE — PROGRESS NOTES
[facility]  Pulmonary Transplant Follow Up Visit  January 7, 2022    Reason for Visit  Edson Thornton is a 56 year old year old male who is being seen for RECHECK (lung tx follow up )               Lung Tx Summary:     Transplants:  10/16/2021 (Lung), Postoperative day:  82    Edson Thornton is a 56 year old year old male who underwent BSLT transplant on 10/16/2021 (Lung) for NSIP/ILD with bronchiectasis, currently postoperative day:  82. He has a PMH of hypogammglobulinemia, and steroid induced DM. His post transplant course is complicated by early encephalopathy with CVA, afib RVR, GIB, and candidemia/candida empyema, with cardiac arrest on 11/2 from acute GIB. He was subsequently resuscitated and ultimately discharged to ARU 12/13- 12/30.   He was last seen by GERA Manuel on 1/3/22         Interval Histories:     January 6, 2022    Received 1L NS on 1/3/22 along with 2g of Mg. Doesn't feel like he's walking as far as he was in rehab and feels like his strength is regressing. Nausea is improved since last visit, no emesis. No fevers, no chills, no night sweats. No heartburn/acid reflux.  No cough, no wheezing, minimal sputum production, does feel dyspneic with minimal exertion and thinks this may be worse than previous but also thinks that he may be more deconditioned. Stools are getting looser than what they were, usually about twice a day, not liquid. Feels full all the time, but denies nausea, vomiting especially with addition of Zofran. Has a little tenderness along left side of incision with inspiration, chronic since surgery. No sternal popping or clicking. +paresthesias in the hands since surgery.     Exercise  Previously pulmonary rehab, will be transitioning to physical therapy instead based on Pulm rehab recommendations      The patient was seen and examined by Janee Johns MD     A complete ROS was otherwise negative except as noted  in the HPI.           Medications:   Medications were reviewed and read talked to patient and fiancé extensively throughout the visit and updated    Current Outpatient Medications   Medication     acetaminophen (TYLENOL) 325 MG tablet     amLODIPine (NORVASC) 5 MG tablet     calcium carbonate 600 mg-vitamin D 400 units (CALTRATE) 600-400 MG-UNIT per tablet     escitalopram (LEXAPRO) 20 MG tablet     fluconazole (DIFLUCAN) 400-0.9 MG/200ML-% infusion     fluticasone (FLONASE) 50 MCG/ACT nasal spray     folic acid-vit B6-vit B12 (FOLGARD) 0.8-10-0.115 MG TABS per tablet     insulin glargine (LANTUS PEN) 100 UNIT/ML pen     levalbuterol (XOPENEX HFA) 45 MCG/ACT inhaler     levofloxacin (LEVAQUIN) 500 MG tablet     levothyroxine (SYNTHROID/LEVOTHROID) 25 MCG tablet     magnesium oxide (MAG-OX) 400 MG tablet     melatonin 10 MG TABS tablet     multivitamin w/minerals (THERA-VIT-M) tablet     mycophenolate (GENERIC EQUIVALENT) 250 MG capsule     nystatin (MYCOSTATIN) 761758 UNIT/ML suspension     ondansetron (ZOFRAN-ODT) 4 MG ODT tab     pantoprazole (PROTONIX) 40 MG EC tablet     predniSONE (DELTASONE) 5 MG tablet     rosuvastatin (CRESTOR) 10 MG tablet     sulfamethoxazole-trimethoprim (BACTRIM) 400-80 MG tablet     traZODone (DESYREL) 50 MG tablet     tacrolimus (GENERIC EQUIVALENT) 0.5 MG capsule     tacrolimus (GENERIC EQUIVALENT) 1 MG capsule     warfarin ANTICOAGULANT (COUMADIN) 1 MG tablet     warfarin ANTICOAGULANT (COUMADIN) 1 MG tablet     No current facility-administered medications for this visit.            Allergies:     No Known Allergies         Past Medical and Past Surgical History:     Past Medical History:   Diagnosis Date     BRENNAN (acute kidney injury) (H) 10/17/2021     Anxiety      Depression      HLD (hyperlipidemia)      HTN (hypertension)      Hypothyroidism      ILD (interstitial lung disease) (H)      SALTY on CPAP      Oxygen dependent     BL 4L since ~6/2021     Rheumatoid arthritis (H)      signs ~5/2020, dx 5/2021     S/P lung transplant (H) 10/16/2021     Shock liver 10/17/2021     Steroid-induced hyperglycemia      Traction bronchiectasis (H)        Past Surgical History:   Procedure Laterality Date     COLONOSCOPY W/ BIOPSIES AND POLYPECTOMY  07/21/2020     CV CORONARY ANGIOGRAM N/A 09/08/2021    Procedure: Coronary Angiogram with possible intervention;  Surgeon: Jovon Bullock MD;  Location:  HEART CARDIAC CATH LAB     CV RIGHT HEART CATH MEASUREMENTS RECORDED N/A 09/08/2021    Procedure: Right Heart Cath;  Surgeon: Jovon Bullock MD;  Location:  HEART CARDIAC CATH LAB     ESOPHAGOSCOPY, GASTROSCOPY, DUODENOSCOPY (EGD), COMBINED N/A 10/23/2021    Procedure: ESOPHAGOGASTRODUODENOSCOPY (EGD);  Surgeon: Miquel Pisano MD;  [facility]     ESOPHAGOSCOPY, GASTROSCOPY, DUODENOSCOPY (EGD), COMBINED N/A 11/02/2021    Procedure: ESOPHAGOGASTRODUODENOSCOPY (EGD);  Surgeon: Daniel Ortiz MD;  Location:  GI     ESOPHAGOSCOPY, GASTROSCOPY, DUODENOSCOPY (EGD), COMBINED N/A 11/5/2021    Procedure: ESOPHAGOGASTRODUODENOSCOPY (EGD);  Surgeon: Ronnell Hernandez MD;  [facility]     EXTRACTION(S) DENTAL N/A 09/22/2021    Procedure: EXTRACTION tooth #19;  Surgeon: Deepak Tobin DDS;  [facility]     HERNIA REPAIR       IR CHEST TUBE PLACEMENT NON-TUNNELLED LEFT  11/03/2021     PICC TRIPLE LUMEN PLACEMENT Left 11/04/2021    5FR TL PICC. Right non occlusive thrombus subclavian vein.     REPLACE GASTROJEJUNOSTOMY TUBE, PERCUTANEOUS N/A 11/9/2021    Procedure: REPLACEMENT, GASTROJEJUNOSTOMY TUBE, PERCUTANEOUS;  Surgeon: Hernando Rodriguez MD;  [facility]     right acl       TRACHEOSTOMY PERCUTANEOUS N/A 10/29/2021    Procedure: Percutaneous Tracheostomy,;  Surgeon: Celine Jenkins MD;  [facility]     TRANSPLANT LUNG RECIPIENT SINGLE X2 Bilateral 10/16/2021    Procedure: TRANSPLANT, LUNG, RECIPIENT, BILATERAL, Bronchoscopy, on-pump perfusion,  bilateral clamshell sternotomy;  Surgeon: Yanick Corral MD;  [facility]     XR ACROMIOCLAVICULAR JOINT BILATERAL               Social History:     Social History     Socioeconomic History     Marital status: Single     Spouse name: Not on file     Number of children: Not on file     Years of education: Not on file     Highest education level: Not on file   Occupational History     Not on file   Tobacco Use     Smoking status: Never Smoker     Smokeless tobacco: Never Used   Substance and Sexual Activity     Alcohol use: Never     Drug use: Never     Sexual activity: Not on file   Other Topics Concern     Parent/sibling w/ CABG, MI or angioplasty before 65F 55M? Not Asked   Social History Narrative     Not on file     Social Determinants of Health     Financial Resource Strain: Not on file   Food Insecurity: Not on file   Transportation Needs: Not on file   Physical Activity: Not on file   Stress: Not on file   Social Connections: Not on file   Intimate Partner Violence: Not on file   Housing Stability: Not on file     Social Updates:          Rejection and Infection History     Rejection Hx    DATE INDICATION  PATH BAL/MICRO TREATMENT                Infectious Hx           Exam:     /78   Pulse 96   Wt 64 kg (141 lb)   SpO2 93%   BMI 23.61 kg/m    Body mass index is 23.61 kg/m .    GENERAL APPEARANCE: Well developed, well nourished, alert, appears frail and deconditioned mildly disheveled  EYES: PERRL, EOMI  HENT: Nasal mucosa with no edema and no hyperemia. No nasal polyps.  EARS: Canals clear, TMs normal  MOUTH: Oral mucosa is moist, without any lesions, no tonsillar enlargement, no oropharyngeal exudate.  RESP: normal percussion, good air flow throughout.  No crackles. No rhonchi. No wheezes.  CV: Normal S1, S2, regular rhythm, tachycardic, no murmur.  No rub. No gallop. No LE edema.   ABDOMEN:  Bowel sounds normal, soft, nontender, no HSM or masses.   MS: extremities normal. + clubbing.  No cyanosis.  SKIN: no rash on limited exam  NEURO: Significant baseline tremor  PSYCH: mentation appears normal. and affect normal to slightly flat         Data:     Results:  Recent Results (from the past 168 hour(s))   Basic metabolic panel    Collection Time: 01/03/22  7:43 AM   Result Value Ref Range    Sodium 141 133 - 144 mmol/L    Potassium 4.4 3.4 - 5.3 mmol/L    Chloride 105 94 - 109 mmol/L    Carbon Dioxide (CO2) 22 20 - 32 mmol/L    Anion Gap 14 3 - 14 mmol/L    Urea Nitrogen 39 (H) 7 - 30 mg/dL    Creatinine 1.79 (H) 0.66 - 1.25 mg/dL    Calcium 10.2 (H) 8.5 - 10.1 mg/dL    Glucose 114 (H) 70 - 99 mg/dL    GFR Estimate 44 (L) >60 mL/min/1.73m2   Magnesium    Collection Time: 01/03/22  7:43 AM   Result Value Ref Range    Magnesium 1.4 (L) 1.6 - 2.3 mg/dL   CBC with platelets    Collection Time: 01/03/22  7:43 AM   Result Value Ref Range    WBC Count 14.2 (H) 4.0 - 11.0 10e3/uL    RBC Count 5.26 4.40 - 5.90 10e6/uL    Hemoglobin 14.1 13.3 - 17.7 g/dL    Hematocrit 43.4 40.0 - 53.0 %    MCV 83 78 - 100 fL    MCH 26.8 26.5 - 33.0 pg    MCHC 32.5 31.5 - 36.5 g/dL    RDW 14.0 10.0 - 15.0 %    Platelet Count 237 150 - 450 10e3/uL   INR    Collection Time: 01/03/22  7:43 AM   Result Value Ref Range    INR 4.01 (H) 0.85 - 1.15   IgG    Collection Time: 01/03/22  7:43 AM   Result Value Ref Range    Immunoglobulin G 502 (L) 610-1,616 mg/dL   Tacrolimus by Tandem Mass Spectrometry    Collection Time: 01/03/22  7:43 AM   Result Value Ref Range    Tacrolimus by Tandem Mass Spectrometry 16.2 (H) 5.0 - 15.0 ug/L    Tacrolimus Last Dose Date 1/2/2022     Tacrolimus Last Dose Time  6:00 PM    HLA Donor Specific Antibody    Collection Time: 01/03/22  7:43 AM   Result Value Ref Range    Donor Identification 10/16/2021     Organ Lung     DSA Present YES     DQB5 3072     DSA Comments        Flow Single Antigen Beads assays are intended for detection/identification of IgG anti-HLA antibodies. Mfi values may not accurately  quantify donor-specific antibody levels in all instances.    DSA Test Method [facility]    HLA Tal Class I, Single Antigen    Collection Time: 01/03/22  7:43 AM   Result Value Ref Range    SA 1 TEST METHOD [facility]     SA 1 CELL Class I     SA1 HI RISK TAL None     SA1 MOD RISK TAL B:7     SA 1  COMMENTS        Test performed by modified procedure. Serum heat inactivated and tested by a modified ([place]) protocol including fetal calf serum addition. High-risk, mfi >3,000. Mod-risk, mfi 500-3,000.   HLA Tal Class II, Single Antigen    Collection Time: 01/03/22  7:43 AM   Result Value Ref Range    SA 2 TEST METHOD [facility]     SA 2 CELL Class II     SA2 HI RISK TAL DQ:5     SA2 MOD RISK TAL None     SA 2 COMMENTS        Test performed by modified procedure. Serum heat inactivated and tested by a modified ([place]) protocol including fetal calf serum addition. High-risk, mfi >3,000. Mod-risk, mfi 500-3,000.   HLA Tal, CPRA    Collection Time: 01/03/22  7:43 AM   Result Value Ref Range    UNOS CPRA 26     UNACCEPTABLE ANTIGENS DQ:5     General PFT Lab (Please always keep checked)    Collection Time: 01/03/22  8:00 AM   Result Value Ref Range    FVC-Pred 3.98 L    FVC-Pre 2.11 L    FVC-%Pred-Pre 52 %    FEV1-Pre 1.27 L    FEV1-%Pred-Pre 40 %    FEV1FVC-Pred 79 %    FEV1FVC-Pre 60 %    FEFMax-Pred 8.37 L/sec    FEFMax-Pre 4.48 L/sec    FEFMax-%Pred-Pre 53 %    FEF2575-Pred 2.83 L/sec    FEF2575-Pre 0.62 L/sec    LMW8855-%Pred-Pre 21 %    ExpTime-Pre 7.29 sec    FIFMax-Pre 4.70 L/sec    FEV1FEV6-Pred 80 %    FEV1FEV6-Pre 62 %   Basic metabolic panel    Collection Time: 01/05/22 10:30 AM   Result Value Ref Range    Sodium 140 133 - 144 mmol/L    Potassium 3.9 3.4 - 5.3 mmol/L    Chloride 104 94 - 109 mmol/L    Carbon Dioxide (CO2) 27 20 - 32 mmol/L    Anion Gap 9 3 - 14 mmol/L    Urea Nitrogen 27 7 - 30 mg/dL    Creatinine 1.45 (H) 0.66 - 1.25 mg/dL    Calcium 9.6 8.5 - 10.1 mg/dL    Glucose 86 70 - 99 mg/dL    GFR Estimate 57 (L) >60  mL/min/1.73m2   Magnesium    Collection Time: 01/05/22 10:30 AM   Result Value Ref Range    Magnesium 1.4 (L) 1.6 - 2.3 mg/dL   Phosphorus    Collection Time: 01/05/22 10:30 AM   Result Value Ref Range    Phosphorus 2.4 (L) 2.5 - 4.5 mg/dL   INR    Collection Time: 01/05/22 10:30 AM   Result Value Ref Range    INR 4.85 (H) 0.85 - 1.15   CBC with platelets and differential    Collection Time: 01/05/22 10:30 AM   Result Value Ref Range    WBC Count 11.4 (H) 4.0 - 11.0 10e3/uL    RBC Count 4.58 4.40 - 5.90 10e6/uL    Hemoglobin 12.4 (L) 13.3 - 17.7 g/dL    Hematocrit 38.6 (L) 40.0 - 53.0 %    MCV 84 78 - 100 fL    MCH 27.1 26.5 - 33.0 pg    MCHC 32.1 31.5 - 36.5 g/dL    RDW 14.3 10.0 - 15.0 %    Platelet Count 197 150 - 450 10e3/uL    % Neutrophils 82 %    % Lymphocytes 8 %    % Monocytes 8 %    % Eosinophils 0 %    % Basophils 0 %    % Immature Granulocytes 2 %    NRBCs per 100 WBC 0 <1 /100    Absolute Neutrophils 9.2 (H) 1.6 - 8.3 10e3/uL    Absolute Lymphocytes 0.9 0.8 - 5.3 10e3/uL    Absolute Monocytes 0.9 0.0 - 1.3 10e3/uL    Absolute Eosinophils 0.0 0.0 - 0.7 10e3/uL    Absolute Basophils 0.1 0.0 - 0.2 10e3/uL    Absolute Immature Granulocytes 0.2 <=0.4 10e3/uL    Absolute NRBCs 0.0 10e3/uL                       Date Place TLC (%) FVC (%) FEV1 (%) FEV1/FVC DLCO (%) Note   1/3/22 [facility]   2.11 52 1.27 40 60   First post txp                                                  Results as noted above.    CXR:  Chest x-ray personally reviewed on January 7, 2022, noted bilateral small pleural effusions and small area of cavitation in the left lower chest space does not appear vastly different from last chest x-ray 4 days prior         Assessment and Plan:     Transplants:    Edson Thornton is a 56 year old year old male who underwent BSLT transplant on 10/16/2021 (Lung) for NSIP/ILD with bronchiectasis,  He has a PMH of hypogammglobulinemia, and steroid induced DM. His post transplant course is complicated by early  encephalopathy with CVA, afib RVR, GIB, and candidemia/candida empyema, with cardiac arrest on 11/2 from acute GIB. He was subsequently resuscitated and ultimately discharged to ARU 12/13- 12/30.     PULMONARY    Transplant:       Allograft Function: Post op course very complicated with persistent b/l pleural effusions and candida empyema leading to left trapped lung and LLL cavitary lesion. Initial post txp PFTs are lower than expected.  Suspect at this point that he is also severely deconditioned.  Chest x-ray reviewed today has small bilateral pleural effusions we will need to monitor these for potential drainage if increased.  Left lower lobe cavitation does not appear grossly changed follow-up with transplant ID as noted below.  No PFTs today.  Suspect dyspnea is related to deconditioning.  -Switch from [facility] to physical therapy per recommendations of [facility] and in discussion with patient      Immunosuppression:  Induction therapy: Basilixmab and high dose steroids  Tac goal 8-12  MMF 1000 bid (avoid AZA given TPMT)   Prednisone 10/7.5    Date AM dose (mg) PM dose (mg)   1/2/22 10 7.5   1/30/22 7.5 7.5   2/27/22 7.5 5   3/27/22 5 5   4/24/22 5 2.5         Prophylaxis:   PJP: Bactrim  CMV: D -/R-: CMV monthly check  EBV: D+ /R+: EBV q 3 months   Fungal: Nystatin    Positive X-Match: Positive DSA, received 2 doses of ritux in June which is likely contributing to x-match result. DSA newly positive 11/29 DQB5 (mfi 2522) which has been oscillating around the mean with a peak of 3924 on 12/6 and most recently 1703 on 12/27.     Concern for Strongyloides exposure: Pre transplant s/p ivermectin x 1 dose, donor and recip cultures NGTD.     LLL Cavitation: Klebsiella pneumoniae, Pseudomonas fluorescens/putida: Txp ID following has been on multiple rounds of antibiotics and currently on levofloxacin until 1/18/22 with follow up with Txp ID for same date with repeat chest CT at that time.     Disseminated Candida:  Candidemia 10/20/21 and resp cultures along with candida empyema. Remains on IV fluconazole until 1/18/22, txp ID follow up.   -If creatinine remains elevated will need to dose reduce fluconazole    Hypogammaglobulinemia: IgG required repletion several times and despite requiring was unable to receive at ARU.     BRENNAN: Baseline Cr of 1, progressive increase over several weeks in ARU likely prereanl  - Stop lasix for now  -He will increase his fluid intake over the weekend may need IVF next week  - IVF received on 1/3    Nausea/Vomiting: Has been having nausea and vomiting with eating, zofran is helping with symptoms.  And has not had any emesis since prior to last visit    Hyperglycemia: Steroid induced, on 5U lantus.     SALTY: Pretxp was supposed to be on home CPAP.     HTN: BP previously elevated in clinic 128/91  - Monitor  - Cont amlodipine    Depression/anxiety:   - Escitalopram    Multifocal Ischemic CVA: Etio embolic vs. Perioperative initially with symptoms b/l UE paresis R>L, thought 2/2 new onset afib.   - SBP goal <140  - Continue warfarin and rosuvastatin    AFib with RVR:  Metop and amio d/c'd due to intermittent bradycardia.   - Zio patch with EP follow up  - Warfarin      Rheumatoid Arthritis:  Previously treated with ritux.   - Will eventually require follow up     Malnutrition: PEG/J removed 12/24 with improved PO intake, appetite   - MV  - Folate, B6, B12 supplements    GIB: Severe progressive hypotension with resultant sally/asystole 2/2 GIB in setting of anticoagulation requiring massive transfusion protocol in October during hospitalization and found to be secondary to ulceration near PEG bumper site.   - PPI BID    Hypothyroidism: TSH 3.17 on 11/19  - Levothyroxine 25 mcg daily    Health Literacy: Reviewed medications with patient and wife today several times over and reclarified which medications were anti-infective in which medications were antirejection.  There was some significant confusion  and difficulty with editing meds and doses.  Has follow-up with pharmacy this coming week to clarify and reteach.      Maintenance:  Derm Exam: Due Annually  Colon Ca Screening:  DEXA:   Imms:   Uufnszhhy42:  unknown; Prevnar 13: unknown  TDaP:  2018  Shingrix: 2018, 2019  Evusheld administered 1/7/22    The patient was seen and evaluated by me. We discussed the risks and benefits of receiving EVUSHELD, as well as alternative treatments. The Emergency Use Administration (EUA) was provided to the patient for review and an opportunity for questions was provided. Patient wishes to proceed with EVUSHELD.        CHANGES TODAY    - Will need EP follow up and zio patch    - Hold on pulm rehab and will start PT instead    - Will potentially need to dose reduce fluconazole if Cr remains elevated    - Medications extensively reviewed, has follow up with pharmacy will need to reinforce education of medications at every visit    - Stop lasix    I personally spent 40 minutes in documentation, the interview and exam, and review of the chart/labs/imaging on 1/7/22            Janee Johns MD  [facility]   Pulmonary Transplant   Pre Transplant Coordinator: Margoth Medeiros  Ph: [phone]  Post Transplant Coordinator: Mady Marin  Fax: [phone]  Ph: [phone]

## 2022-01-06 NOTE — PATIENT INSTRUCTIONS
Recommendations from today's MTM visit:                                                       1. Take Mycophenolate Mofetil and Tacrolimus at 7:45 am and 8 pm, 12 hours apart.   2. Bring in pill box, medications, blood sugar monitor, and med card for our follow up appointment next Tuesday at 1 PM.   3. Continue Tacrolimus 2mg every morning and 1mg every evening.    Follow-up: Tuesday at 1PM    It was great to speak with you today.  I value your experience and would be very thankful for your time with providing feedback on our clinic survey. You may receive a survey via email or text message in the next few days.     To schedule another MTM appointment, please call the clinic directly or you may call the MTM scheduling line at [phone] or toll-free at [phone].     My Clinical Pharmacist's contact information:                                                      Please feel free to contact me with any questions or concerns you have.      Michael Weldon, Rachel  MTM Pharmacist    Phone: [phone]

## 2022-01-07 ENCOUNTER — TELEPHONE (OUTPATIENT)
Dept: TRANSPLANT | Facility: CLINIC | Age: 57
End: 2022-01-07

## 2022-01-07 ENCOUNTER — OFFICE VISIT (OUTPATIENT)
Dept: TRANSPLANT | Facility: CLINIC | Age: 57
End: 2022-01-07
Attending: PHYSICIAN ASSISTANT
Payer: COMMERCIAL

## 2022-01-07 ENCOUNTER — ANCILLARY PROCEDURE (OUTPATIENT)
Dept: GENERAL RADIOLOGY | Facility: CLINIC | Age: 57
End: 2022-01-07
Attending: PHYSICIAN ASSISTANT
Payer: COMMERCIAL

## 2022-01-07 ENCOUNTER — LAB (OUTPATIENT)
Dept: LAB | Facility: CLINIC | Age: 57
End: 2022-01-07
Attending: PHYSICIAN ASSISTANT
Payer: COMMERCIAL

## 2022-01-07 ENCOUNTER — ANTICOAGULATION THERAPY VISIT (OUTPATIENT)
Dept: ANTICOAGULATION | Facility: CLINIC | Age: 57
End: 2022-01-07

## 2022-01-07 ENCOUNTER — HOME INFUSION (PRE-WILLOW HOME INFUSION) (OUTPATIENT)
Dept: PHARMACY | Facility: CLINIC | Age: 57
End: 2022-01-07

## 2022-01-07 VITALS
OXYGEN SATURATION: 93 % | SYSTOLIC BLOOD PRESSURE: 105 MMHG | BODY MASS INDEX: 23.61 KG/M2 | DIASTOLIC BLOOD PRESSURE: 78 MMHG | HEART RATE: 96 BPM | WEIGHT: 141 LBS

## 2022-01-07 DIAGNOSIS — Z94.2 S/P LUNG TRANSPLANT (H): Chronic | ICD-10-CM

## 2022-01-07 DIAGNOSIS — E11.65 TYPE 2 DIABETES MELLITUS WITH HYPERGLYCEMIA, WITH LONG-TERM CURRENT USE OF INSULIN (H): ICD-10-CM

## 2022-01-07 DIAGNOSIS — Z79.4 TYPE 2 DIABETES MELLITUS WITH HYPERGLYCEMIA, WITH LONG-TERM CURRENT USE OF INSULIN (H): ICD-10-CM

## 2022-01-07 DIAGNOSIS — D84.9 IMMUNOSUPPRESSED STATUS (H): ICD-10-CM

## 2022-01-07 DIAGNOSIS — I48.91 ATRIAL FIBRILLATION, UNSPECIFIED TYPE (H): Primary | ICD-10-CM

## 2022-01-07 DIAGNOSIS — Z94.2 LUNG REPLACED BY TRANSPLANT (H): Primary | ICD-10-CM

## 2022-01-07 LAB
ANION GAP SERPL CALCULATED.3IONS-SCNC: 13 MMOL/L (ref 3–14)
BUN SERPL-MCNC: 30 MG/DL (ref 7–30)
CALCIUM SERPL-MCNC: 9.9 MG/DL (ref 8.5–10.1)
CHLORIDE BLD-SCNC: 104 MMOL/L (ref 94–109)
CMV DNA SPEC NAA+PROBE-ACNC: NOT DETECTED IU/ML
CMV DNA SPEC NAA+PROBE-ACNC: NOT DETECTED IU/ML
CO2 SERPL-SCNC: 25 MMOL/L (ref 20–32)
CREAT SERPL-MCNC: 1.67 MG/DL (ref 0.66–1.25)
ERYTHROCYTE [DISTWIDTH] IN BLOOD BY AUTOMATED COUNT: 14.4 % (ref 10–15)
GFR SERPL CREATININE-BSD FRML MDRD: 48 ML/MIN/1.73M2
GLUCOSE BLD-MCNC: 84 MG/DL (ref 70–99)
HCT VFR BLD AUTO: 39.4 % (ref 40–53)
HGB BLD-MCNC: 12.8 G/DL (ref 13.3–17.7)
INR PPP: 3.82 (ref 0.85–1.15)
MAGNESIUM SERPL-MCNC: 1.6 MG/DL (ref 1.6–2.3)
MCH RBC QN AUTO: 26.6 PG (ref 26.5–33)
MCHC RBC AUTO-ENTMCNC: 32.5 G/DL (ref 31.5–36.5)
MCV RBC AUTO: 82 FL (ref 78–100)
PLATELET # BLD AUTO: 185 10E3/UL (ref 150–450)
POTASSIUM BLD-SCNC: 3.8 MMOL/L (ref 3.4–5.3)
RBC # BLD AUTO: 4.82 10E6/UL (ref 4.4–5.9)
SODIUM SERPL-SCNC: 142 MMOL/L (ref 133–144)
TACROLIMUS BLD-MCNC: 12.9 UG/L (ref 5–15)
TME LAST DOSE: NORMAL H
TME LAST DOSE: NORMAL H
WBC # BLD AUTO: 13.5 10E3/UL (ref 4–11)

## 2022-01-07 PROCEDURE — 87799 DETECT AGENT NOS DNA QUANT: CPT | Mod: 90 | Performed by: PATHOLOGY

## 2022-01-07 PROCEDURE — 99215 OFFICE O/P EST HI 40 MIN: CPT | Mod: 24 | Performed by: INTERNAL MEDICINE

## 2022-01-07 PROCEDURE — 83735 ASSAY OF MAGNESIUM: CPT | Performed by: PATHOLOGY

## 2022-01-07 PROCEDURE — 71046 X-RAY EXAM CHEST 2 VIEWS: CPT | Mod: GC | Performed by: RADIOLOGY

## 2022-01-07 PROCEDURE — 250N000011 HC RX IP 250 OP 636: Performed by: INTERNAL MEDICINE

## 2022-01-07 PROCEDURE — G0463 HOSPITAL OUTPT CLINIC VISIT: HCPCS | Mod: 25

## 2022-01-07 PROCEDURE — 80048 BASIC METABOLIC PNL TOTAL CA: CPT | Performed by: PATHOLOGY

## 2022-01-07 PROCEDURE — 36415 COLL VENOUS BLD VENIPUNCTURE: CPT | Performed by: PATHOLOGY

## 2022-01-07 PROCEDURE — 85027 COMPLETE CBC AUTOMATED: CPT | Performed by: PATHOLOGY

## 2022-01-07 PROCEDURE — 96372 THER/PROPH/DIAG INJ SC/IM: CPT | Performed by: INTERNAL MEDICINE

## 2022-01-07 PROCEDURE — M0220 HC INJECTION TIXAGEVIMAB & CILGAVIMAB (EVUSHELD): HCPCS | Performed by: INTERNAL MEDICINE

## 2022-01-07 PROCEDURE — 85610 PROTHROMBIN TIME: CPT | Performed by: PATHOLOGY

## 2022-01-07 PROCEDURE — 80197 ASSAY OF TACROLIMUS: CPT | Mod: 90 | Performed by: PATHOLOGY

## 2022-01-07 PROCEDURE — 99000 SPECIMEN HANDLING OFFICE-LAB: CPT | Performed by: PATHOLOGY

## 2022-01-07 RX ORDER — TACROLIMUS 1 MG/1
1 CAPSULE ORAL 2 TIMES DAILY
Qty: 60 CAPSULE | Refills: 11 | Status: SHIPPED | OUTPATIENT
Start: 2022-01-07 | End: 2022-01-14

## 2022-01-07 RX ORDER — TACROLIMUS 0.5 MG/1
0.5 CAPSULE ORAL EVERY MORNING
Qty: 30 CAPSULE | Refills: 11 | Status: SHIPPED | OUTPATIENT
Start: 2022-01-07 | End: 2022-01-14

## 2022-01-07 RX ADMIN — Medication: at 15:31

## 2022-01-07 NOTE — PROGRESS NOTES
ANTICOAGULATION MANAGEMENT     Edson Thornton 56 year old male is on warfarin with supratherapeutic INR result. (Goal INR 2.0-3.0)    Recent labs: (last 7 days)     01/07/22  0836   INR 3.82*       ASSESSMENT     Source(s): Chart Review and Patient/Caregiver Call     Warfarin doses taken: Warfarin taken as instructed, Held Warfarin on 1/5/22 & 1/6/22  Diet: No new diet changes identified  New illness, injury, or hospitalization: No, patient took Zofran, and nausea has resolved.  Medication/supplement changes: Continues bactrim, nystatin, prednisone, diflucan, levaquin   Signs or symptoms of bleeding or clotting: No  Previous INR: Supratherapeutic  Additional findings: None     PLAN     Recommended plan for no diet, medication or health factor changes affecting INR     Dosing Instructions: Hold dose then continue your current warfarin dose with next INR in 3 days       Summary  As of 1/7/2022    Full warfarin instructions:  1/7: Hold; Otherwise 1 mg every Tue, Thu, Sat; 2 mg all other days   Next INR check:  1/10/2022             Telephone call with Edson who agrees to plan and repeated back plan correctly    Check at provider office visit    Education provided: Please call back if any changes to your diet, medications or how you've been taking warfarin, Monitoring for bleeding signs and symptoms, Monitoring for clotting signs and symptoms and When to seek medical attention/emergency care    Plan made with [facility] Pharmacist Nick Richmond, RN  Anticoagulation Clinic  1/7/2022    _______________________________________________________________________     Anticoagulation Episode Summary     Current INR goal:  2.0-3.0   TTR:  --   Target end date:  Indefinite   Send INR reminders to:  [facility]    Indications    Atrial fibrillation  unspecified type (H) [I48.91]           Comments:  [facility] P: [phone]  Transplant Labs twice a week  Staying Local: [facility] Gabriel then back to  [place]         Anticoagulation Care Providers     Provider Role Specialty Phone number    Abiodun Woods MD Referring Pulmonary Disease [phone]

## 2022-01-07 NOTE — PATIENT INSTRUCTIONS
Patient Instructions  1. Continue to hydrate with 60-70 oz fluids daily.  2. Continue to exercise daily or most days of the week.  3. Decrease your tacrolimus dose to 1.5mg in the AM and 1mg in the PM.   4. Stop taking furosemide (Lasix).   5. Your current dose of prednisone is 10mg in the AM and 7.5mg in the PM.   Your taper is below   Date AM dose (mg) PM dose (mg)        1/2/22 10 7.5   1/30/22 7.5 7.5   2/27/22 7.5 5   3/27/22 5 5   4/24/22 5 2.5       6. It doesn't seem like the COVID vaccine is working well in lung transplant patients. A number of lung transplant patients have gotten sick with COVID even after receiving the vaccines.  Based on our recent experience, it can be life-threatening to get COVID  even after being vaccinated. Please continue to act like you did not get the COVID vaccine - social distancing, wearing a mask, good hand hygiene, etc. If the people around you are vaccinated, it will help reduce the risk of you getting COVID. All members of your household should be vaccinated.      Next transplant clinic appointment: Monday 1/10 with CXR, labs and PFTs  Next lab draw: Monday, 1/10      ~~~~~~~~~~~~~~~~~~~~~~~~~    Thoracic Transplant Office phone [phone], fax [phone]    Office Hours 8:30 - 5:00     For after-hours urgent issues, please dial [phone], and ask to speak with the Thoracic Transplant Coordinator On-Call.  --------------------  To expedite your medication refill(s), please contact your pharmacy and have them fax a refill request to: [phone]  .   *Please allow 3 business days for routine medication refills.  *Please allow 5 business days for controlled substance medication refills.    **For Diabetic medications and supplies refill(s), please contact your pharmacy and have them contact your Endocrine team.  --------------------  For scheduling appointments call [phone].  --------------------  Please Note: If you are active on [facility], all future test  results will be sent by [employer] message only, and will no longer be called to patient. You may also receive communication directly from your physician.

## 2022-01-07 NOTE — PROGRESS NOTES
Transplant Coordinator Note    Reason for visit: Post lung transplant follow up visit   Coordinator: Present   Caregiver:  Peg BROWN    Health concerns addressed today:  1. Respiratory - shortness of breath with activity - maybe a little worse than when in rehab. No cough, no congestion. No wheezing, no sputum production  2. Feeling fatigued/weak - can't walk as far as did in rehab, doesn't feel like able to do as much.   3. Has tremors - comes and goes, worse later in the day.    4. GI nausea - improved with zofran. Denies heartburn. Bms loose, usually 2 BM/day. No urgency/accidents.   5. No fevers, chills, no night sweats.   6. ENT: no issues, at baseline    Activity/rehab: home PT until stronger, then pulmonary rehab  Oxygen needs: room air  Pain management/RX: some pain with big breaths - not new.   Next Bronch due: deferred d/t COVID  Risk Criteria Labs:  due POD 90  CMV status:D-/R-  EBV status: D+/R+  PJP prophylactic: Bactrim    COVID:  1. COVID-19 infection (yes/no, date of most recent positive test):   2. Status/instructions given about COVID-19 vaccine:     Pt Education: medications (use/dose/side effects), how/when to call coordinator, frequency of labs, s/s of infection/rejection, call prior to starting any new medications, lab/vital sign book    Health Maintenance:     Last colonoscopy:     Next colonoscopy due:     Dermatology:    Vaccinations this visit:     Labs, CXR, PFTs reviewed with patient  Medication record reviewed and reconciled  Questions and concerns addressed    Patient Instructions  1. Continue to hydrate with 60-70 oz fluids daily.  2. Continue to exercise daily or most days of the week.  3. Decrease your tacrolimus dose to 1.5mg in the AM and 1mg in the PM.   4. Stop taking furosemide (Lasix).   5. Your current dose of prednisone is 10mg in the AM and 7.5mg in the PM.   Your taper is below   Date AM dose (mg) PM dose (mg)        1/2/22 10 7.5   1/30/22 7.5 7.5   2/27/22 7.5 5   3/27/22  5 5   4/24/22 5 2.5       6. It doesn't seem like the COVID vaccine is working well in lung transplant patients. A number of lung transplant patients have gotten sick with COVID even after receiving the vaccines.  Based on our recent experience, it can be life-threatening to get COVID  even after being vaccinated. Please continue to act like you did not get the COVID vaccine - social distancing, wearing a mask, good hand hygiene, etc. If the people around you are vaccinated, it will help reduce the risk of you getting COVID. All members of your household should be vaccinated.      Next transplant clinic appointment: Monday 1/10 with CXR, labs and PFTs  Next lab draw: Monday, 1/10      AVS printed at time of check out

## 2022-01-07 NOTE — LETTER
Date:January 11, 2022      Patient was self referred, no letter generated. Do not send.        [facility] Health Information

## 2022-01-07 NOTE — NURSING NOTE
Chief Complaint   Patient presents with     RECHECK     lung tx follow up        /78   Pulse 96   Wt 64 kg (141 lb)   SpO2 93%   BMI 23.61 kg/m      EVUSHELD Administration Note:  Edson Thornton presents today for EVUSHELD.  Patient seen by provider today: Yes: Dr. Johns   present during visit today: Not Applicable.    Note: N/A.    Confirmed patient received the Emergency Use Authorization Fact Sheet for Patients, Parents and Caregivers prior to receiving medication. Confirmed patient had conversation with provider about medication and no further questions at this time.     Post Infusion Assessment:  Patient observed for 60 minutes post administration per protocol.   Patient was observed in the room for a minimum of 10 minutes after injection per standard, then remained in the buidling for a total of 60 minute observation period.      Discharge Plan:   Patient and/or family verbalized understanding of discharge instructions and all questions answered.      Autumn Almonte, CMA

## 2022-01-07 NOTE — LETTER
1/7/2022         RE: Edson Thornton  [address]  [place] [address]        Dear Colleague,    Thank you for referring your patient, Edson Thornton, to the [facility]. Please see a copy of my visit note below.    [facility]  Pulmonary Transplant Follow Up Visit  January 7, 2022    Reason for Visit  Edson Thornton is a 56 year old year old male who is being seen for RECHECK (lung tx follow up )               Lung Tx Summary:     Transplants:  10/16/2021 (Lung), Postoperative day:  82    Edson Thornton is a 56 year old year old male who underwent BSLT transplant on 10/16/2021 (Lung) for NSIP/ILD with bronchiectasis, currently postoperative day:  82. He has a PMH of hypogammglobulinemia, and steroid induced DM. His post transplant course is complicated by early encephalopathy with CVA, afib RVR, GIB, and candidemia/candida empyema, with cardiac arrest on 11/2 from acute GIB. He was subsequently resuscitated and ultimately discharged to ARU 12/13- 12/30.   He was last seen by GERA Manuel on 1/3/22         Interval Histories:     January 6, 2022    Received 1L NS on 1/3/22 along with 2g of Mg. Doesn't feel like he's walking as far as he was in rehab and feels like his strength is regressing. Nausea is improved since last visit, no emesis. No fevers, no chills, no night sweats. No heartburn/acid reflux.  No cough, no wheezing, minimal sputum production, does feel dyspneic with minimal exertion and thinks this may be worse than previous but also thinks that he may be more deconditioned. Stools are getting looser than what they were, usually about twice a day, not liquid. Feels full all the time, but denies nausea, vomiting especially with addition of Zofran. Has a little tenderness along left side of incision with inspiration, chronic since surgery. No sternal popping or clicking. +paresthesias in the hands since  surgery.     Exercise  Previously pulmonary rehab, will be transitioning to physical therapy instead based on Pulm rehab recommendations      The patient was seen and examined by Janee Johns MD     A complete ROS was otherwise negative except as noted in the HPI.           Medications:   Medications were reviewed and read talked to patient and fiancé extensively throughout the visit and updated    Current Outpatient Medications   Medication     acetaminophen (TYLENOL) 325 MG tablet     amLODIPine (NORVASC) 5 MG tablet     calcium carbonate 600 mg-vitamin D 400 units (CALTRATE) 600-400 MG-UNIT per tablet     escitalopram (LEXAPRO) 20 MG tablet     fluconazole (DIFLUCAN) 400-0.9 MG/200ML-% infusion     fluticasone (FLONASE) 50 MCG/ACT nasal spray     folic acid-vit B6-vit B12 (FOLGARD) 0.8-10-0.115 MG TABS per tablet     insulin glargine (LANTUS PEN) 100 UNIT/ML pen     levalbuterol (XOPENEX HFA) 45 MCG/ACT inhaler     levofloxacin (LEVAQUIN) 500 MG tablet     levothyroxine (SYNTHROID/LEVOTHROID) 25 MCG tablet     magnesium oxide (MAG-OX) 400 MG tablet     melatonin 10 MG TABS tablet     multivitamin w/minerals (THERA-VIT-M) tablet     mycophenolate (GENERIC EQUIVALENT) 250 MG capsule     nystatin (MYCOSTATIN) 202639 UNIT/ML suspension     ondansetron (ZOFRAN-ODT) 4 MG ODT tab     pantoprazole (PROTONIX) 40 MG EC tablet     predniSONE (DELTASONE) 5 MG tablet     rosuvastatin (CRESTOR) 10 MG tablet     sulfamethoxazole-trimethoprim (BACTRIM) 400-80 MG tablet     traZODone (DESYREL) 50 MG tablet     tacrolimus (GENERIC EQUIVALENT) 0.5 MG capsule     tacrolimus (GENERIC EQUIVALENT) 1 MG capsule     warfarin ANTICOAGULANT (COUMADIN) 1 MG tablet     warfarin ANTICOAGULANT (COUMADIN) 1 MG tablet     No current facility-administered medications for this visit.            Allergies:     No Known Allergies         Past Medical and Past Surgical History:     Past Medical History:   Diagnosis Date     BRENNAN (acute kidney  injury) (H) 10/17/2021     Anxiety      Depression      HLD (hyperlipidemia)      HTN (hypertension)      Hypothyroidism      ILD (interstitial lung disease) (H)      SALTY on CPAP      Oxygen dependent     BL 4L since ~6/2021     Rheumatoid arthritis (H)     signs ~5/2020, dx 5/2021     S/P lung transplant (H) 10/16/2021     Shock liver 10/17/2021     Steroid-induced hyperglycemia      Traction bronchiectasis (H)        Past Surgical History:   Procedure Laterality Date     COLONOSCOPY W/ BIOPSIES AND POLYPECTOMY  07/21/2020     CV CORONARY ANGIOGRAM N/A 09/08/2021    Procedure: Coronary Angiogram with possible intervention;  Surgeon: Jovon Bullock MD;  Location:  HEART CARDIAC CATH LAB     CV RIGHT HEART CATH MEASUREMENTS RECORDED N/A 09/08/2021    Procedure: Right Heart Cath;  Surgeon: Jovon Bullock MD;  Location:  HEART CARDIAC CATH LAB     ESOPHAGOSCOPY, GASTROSCOPY, DUODENOSCOPY (EGD), COMBINED N/A 10/23/2021    Procedure: ESOPHAGOGASTRODUODENOSCOPY (EGD);  Surgeon: Miquel Pisano MD;  Location:  GI     ESOPHAGOSCOPY, GASTROSCOPY, DUODENOSCOPY (EGD), COMBINED N/A 11/02/2021    Procedure: ESOPHAGOGASTRODUODENOSCOPY (EGD);  Surgeon: Daniel Ortiz MD;  Location:  GI     ESOPHAGOSCOPY, GASTROSCOPY, DUODENOSCOPY (EGD), COMBINED N/A 11/5/2021    Procedure: ESOPHAGOGASTRODUODENOSCOPY (EGD);  Surgeon: Ronnell Hernandez MD;  Location:  GI     EXTRACTION(S) DENTAL N/A 09/22/2021    Procedure: EXTRACTION tooth #19;  Surgeon: Deepak Tobin DDS;  Location:  OR     HERNIA REPAIR       IR CHEST TUBE PLACEMENT NON-TUNNELLED LEFT  11/03/2021     PICC TRIPLE LUMEN PLACEMENT Left 11/04/2021    5FR TL PICC. Right non occlusive thrombus subclavian vein.     REPLACE GASTROJEJUNOSTOMY TUBE, PERCUTANEOUS N/A 11/9/2021    Procedure: REPLACEMENT, GASTROJEJUNOSTOMY TUBE, PERCUTANEOUS;  Surgeon: Hernando Rodriguez MD;  Location: [facility]     right acl       TRACHEOSTOMY PERCUTANEOUS N/A  10/29/2021    Procedure: Percutaneous Tracheostomy,;  Surgeon: Celine Jenkins MD;  Location: [facility]     TRANSPLANT LUNG RECIPIENT SINGLE X2 Bilateral 10/16/2021    Procedure: TRANSPLANT, LUNG, RECIPIENT, BILATERAL, Bronchoscopy, on-pump perfusion, bilateral clamshell sternotomy;  Surgeon: Yanick Corral MD;  Location: [facility] OR     XR ACROMIOCLAVICULAR JOINT BILATERAL               Social History:     Social History     Socioeconomic History     Marital status: Single     Spouse name: Not on file     Number of children: Not on file     Years of education: Not on file     Highest education level: Not on file   Occupational History     Not on file   Tobacco Use     Smoking status: Never Smoker     Smokeless tobacco: Never Used   Substance and Sexual Activity     Alcohol use: Never     Drug use: Never     Sexual activity: Not on file   Other Topics Concern     Parent/sibling w/ CABG, MI or angioplasty before 65F 55M? Not Asked   Social History Narrative     Not on file     Social Determinants of Health     Financial Resource Strain: Not on file   Food Insecurity: Not on file   Transportation Needs: Not on file   Physical Activity: Not on file   Stress: Not on file   Social Connections: Not on file   Intimate Partner Violence: Not on file   Housing Stability: Not on file     Social Updates:          Rejection and Infection History     Rejection Hx    DATE INDICATION  PATH BAL/MICRO TREATMENT                Infectious Hx           Exam:     /78   Pulse 96   Wt 64 kg (141 lb)   SpO2 93%   BMI 23.61 kg/m    Body mass index is 23.61 kg/m .    GENERAL APPEARANCE: Well developed, well nourished, alert, appears frail and deconditioned mildly disheveled  EYES: PERRL, EOMI  HENT: Nasal mucosa with no edema and no hyperemia. No nasal polyps.  EARS: Canals clear, TMs normal  MOUTH: Oral mucosa is moist, without any lesions, no tonsillar enlargement, no oropharyngeal exudate.  RESP: normal percussion, good  air flow throughout.  No crackles. No rhonchi. No wheezes.  CV: Normal S1, S2, regular rhythm, tachycardic, no murmur.  No rub. No gallop. No LE edema.   ABDOMEN:  Bowel sounds normal, soft, nontender, no HSM or masses.   MS: extremities normal. + clubbing. No cyanosis.  SKIN: no rash on limited exam  NEURO: Significant baseline tremor  PSYCH: mentation appears normal. and affect normal to slightly flat         Data:     Results:  Recent Results (from the past 168 hour(s))   Basic metabolic panel    Collection Time: 01/03/22  7:43 AM   Result Value Ref Range    Sodium 141 133 - 144 mmol/L    Potassium 4.4 3.4 - 5.3 mmol/L    Chloride 105 94 - 109 mmol/L    Carbon Dioxide (CO2) 22 20 - 32 mmol/L    Anion Gap 14 3 - 14 mmol/L    Urea Nitrogen 39 (H) 7 - 30 mg/dL    Creatinine 1.79 (H) 0.66 - 1.25 mg/dL    Calcium 10.2 (H) 8.5 - 10.1 mg/dL    Glucose 114 (H) 70 - 99 mg/dL    GFR Estimate 44 (L) >60 mL/min/1.73m2   Magnesium    Collection Time: 01/03/22  7:43 AM   Result Value Ref Range    Magnesium 1.4 (L) 1.6 - 2.3 mg/dL   CBC with platelets    Collection Time: 01/03/22  7:43 AM   Result Value Ref Range    WBC Count 14.2 (H) 4.0 - 11.0 10e3/uL    RBC Count 5.26 4.40 - 5.90 10e6/uL    Hemoglobin 14.1 13.3 - 17.7 g/dL    Hematocrit 43.4 40.0 - 53.0 %    MCV 83 78 - 100 fL    MCH 26.8 26.5 - 33.0 pg    MCHC 32.5 31.5 - 36.5 g/dL    RDW 14.0 10.0 - 15.0 %    Platelet Count 237 150 - 450 10e3/uL   INR    Collection Time: 01/03/22  7:43 AM   Result Value Ref Range    INR 4.01 (H) 0.85 - 1.15   IgG    Collection Time: 01/03/22  7:43 AM   Result Value Ref Range    Immunoglobulin G 502 (L) 610-1,616 mg/dL   Tacrolimus by Tandem Mass Spectrometry    Collection Time: 01/03/22  7:43 AM   Result Value Ref Range    Tacrolimus by Tandem Mass Spectrometry 16.2 (H) 5.0 - 15.0 ug/L    Tacrolimus Last Dose Date 1/2/2022     Tacrolimus Last Dose Time  6:00 PM    HLA Donor Specific Antibody    Collection Time: 01/03/22  7:43 AM   Result  Value Ref Range    Donor Identification 10/16/2021     Organ Lung     DSA Present YES     DQB5 3072     DSA Comments        Flow Single Antigen Beads assays are intended for detection/identification of IgG anti-HLA antibodies. Mfi values may not accurately quantify donor-specific antibody levels in all instances.    DSA Test Method [facility]    HLA Tal Class I, Single Antigen    Collection Time: 01/03/22  7:43 AM   Result Value Ref Range    SA 1 TEST METHOD [facility]     SA 1 CELL Class I     SA1 HI RISK TAL None     SA1 MOD RISK TAL B:7     SA 1  COMMENTS        Test performed by modified procedure. Serum heat inactivated and tested by a modified ([place]) protocol including fetal calf serum addition. High-risk, mfi >3,000. Mod-risk, mfi 500-3,000.   HLA Tal Class II, Single Antigen    Collection Time: 01/03/22  7:43 AM   Result Value Ref Range    SA 2 TEST METHOD [facility]     SA 2 CELL Class II     SA2 HI RISK TAL DQ:5     SA2 MOD RISK TAL None     SA 2 COMMENTS        Test performed by modified procedure. Serum heat inactivated and tested by a modified ([place]) protocol including fetal calf serum addition. High-risk, mfi >3,000. Mod-risk, mfi 500-3,000.   HLA Tal, CPRA    Collection Time: 01/03/22  7:43 AM   Result Value Ref Range    UNOS CPRA 26     UNACCEPTABLE ANTIGENS DQ:5     General PFT Lab (Please always keep checked)    Collection Time: 01/03/22  8:00 AM   Result Value Ref Range    FVC-Pred 3.98 L    FVC-Pre 2.11 L    FVC-%Pred-Pre 52 %    FEV1-Pre 1.27 L    FEV1-%Pred-Pre 40 %    FEV1FVC-Pred 79 %    FEV1FVC-Pre 60 %    FEFMax-Pred 8.37 L/sec    FEFMax-Pre 4.48 L/sec    FEFMax-%Pred-Pre 53 %    FEF2575-Pred 2.83 L/sec    FEF2575-Pre 0.62 L/sec    EHW3092-%Pred-Pre 21 %    ExpTime-Pre 7.29 sec    FIFMax-Pre 4.70 L/sec    FEV1FEV6-Pred 80 %    FEV1FEV6-Pre 62 %   Basic metabolic panel    Collection Time: 01/05/22 10:30 AM   Result Value Ref Range    Sodium 140 133 - 144 mmol/L    Potassium 3.9 3.4 - 5.3 mmol/L     Chloride 104 94 - 109 mmol/L    Carbon Dioxide (CO2) 27 20 - 32 mmol/L    Anion Gap 9 3 - 14 mmol/L    Urea Nitrogen 27 7 - 30 mg/dL    Creatinine 1.45 (H) 0.66 - 1.25 mg/dL    Calcium 9.6 8.5 - 10.1 mg/dL    Glucose 86 70 - 99 mg/dL    GFR Estimate 57 (L) >60 mL/min/1.73m2   Magnesium    Collection Time: 01/05/22 10:30 AM   Result Value Ref Range    Magnesium 1.4 (L) 1.6 - 2.3 mg/dL   Phosphorus    Collection Time: 01/05/22 10:30 AM   Result Value Ref Range    Phosphorus 2.4 (L) 2.5 - 4.5 mg/dL   INR    Collection Time: 01/05/22 10:30 AM   Result Value Ref Range    INR 4.85 (H) 0.85 - 1.15   CBC with platelets and differential    Collection Time: 01/05/22 10:30 AM   Result Value Ref Range    WBC Count 11.4 (H) 4.0 - 11.0 10e3/uL    RBC Count 4.58 4.40 - 5.90 10e6/uL    Hemoglobin 12.4 (L) 13.3 - 17.7 g/dL    Hematocrit 38.6 (L) 40.0 - 53.0 %    MCV 84 78 - 100 fL    MCH 27.1 26.5 - 33.0 pg    MCHC 32.1 31.5 - 36.5 g/dL    RDW 14.3 10.0 - 15.0 %    Platelet Count 197 150 - 450 10e3/uL    % Neutrophils 82 %    % Lymphocytes 8 %    % Monocytes 8 %    % Eosinophils 0 %    % Basophils 0 %    % Immature Granulocytes 2 %    NRBCs per 100 WBC 0 <1 /100    Absolute Neutrophils 9.2 (H) 1.6 - 8.3 10e3/uL    Absolute Lymphocytes 0.9 0.8 - 5.3 10e3/uL    Absolute Monocytes 0.9 0.0 - 1.3 10e3/uL    Absolute Eosinophils 0.0 0.0 - 0.7 10e3/uL    Absolute Basophils 0.1 0.0 - 0.2 10e3/uL    Absolute Immature Granulocytes 0.2 <=0.4 10e3/uL    Absolute NRBCs 0.0 10e3/uL                       Date Place TLC (%) FVC (%) FEV1 (%) FEV1/FVC DLCO (%) Note   1/3/22 [facility]   2.11 52 1.27 40 60   First post txp                                                  Results as noted above.    CXR:  Chest x-ray personally reviewed on January 7, 2022, noted bilateral small pleural effusions and small area of cavitation in the left lower chest space does not appear vastly different from last chest x-ray 4 days prior         Assessment and Plan:      Transplants:    Edson Thornton is a 56 year old year old male who underwent BSLT transplant on 10/16/2021 (Lung) for NSIP/ILD with bronchiectasis,  He has a PMH of hypogammglobulinemia, and steroid induced DM. His post transplant course is complicated by early encephalopathy with CVA, afib RVR, GIB, and candidemia/candida empyema, with cardiac arrest on 11/2 from acute GIB. He was subsequently resuscitated and ultimately discharged to ARU 12/13- 12/30.     PULMONARY    Transplant:       Allograft Function: Post op course very complicated with persistent b/l pleural effusions and candida empyema leading to left trapped lung and LLL cavitary lesion. Initial post txp PFTs are lower than expected.  Suspect at this point that he is also severely deconditioned.  Chest x-ray reviewed today has small bilateral pleural effusions we will need to monitor these for potential drainage if increased.  Left lower lobe cavitation does not appear grossly changed follow-up with transplant ID as noted below.  No PFTs today.  Suspect dyspnea is related to deconditioning.  -Switch from [facility] to physical therapy per recommendations of [facility] and in discussion with patient      Immunosuppression:  Induction therapy: Basilixmab and high dose steroids  Tac goal 8-12  MMF 1000 bid (avoid AZA given TPMT)   Prednisone 10/7.5    Date AM dose (mg) PM dose (mg)   1/2/22 10 7.5   1/30/22 7.5 7.5   2/27/22 7.5 5   3/27/22 5 5   4/24/22 5 2.5         Prophylaxis:   PJP: Bactrim  CMV: D -/R-: CMV monthly check  EBV: D+ /R+: EBV q 3 months   Fungal: Nystatin    Positive X-Match: Positive DSA, received 2 doses of ritux in June which is likely contributing to x-match result. DSA newly positive 11/29 DQB5 (mfi 2522) which has been oscillating around the mean with a peak of 3924 on 12/6 and most recently 1703 on 12/27.     Concern for Strongyloides exposure: Pre transplant s/p ivermectin x 1 dose, donor and recip cultures NGTD.     LLL  Cavitation: Klebsiella pneumoniae, Pseudomonas fluorescens/putida: Txp ID following has been on multiple rounds of antibiotics and currently on levofloxacin until 1/18/22 with follow up with Txp ID for same date with repeat chest CT at that time.     Disseminated Candida: Candidemia 10/20/21 and resp cultures along with candida empyema. Remains on IV fluconazole until 1/18/22, txp ID follow up.   -If creatinine remains elevated will need to dose reduce fluconazole    Hypogammaglobulinemia: IgG required repletion several times and despite requiring was unable to receive at ARU.     BRENNAN: Baseline Cr of 1, progressive increase over several weeks in ARU likely prereanl  - Stop lasix for now  -He will increase his fluid intake over the weekend may need IVF next week  - IVF received on 1/3    Nausea/Vomiting: Has been having nausea and vomiting with eating, zofran is helping with symptoms.  And has not had any emesis since prior to last visit    Hyperglycemia: Steroid induced, on 5U lantus.     SALTY: Pretxp was supposed to be on home CPAP.     HTN: BP previously elevated in clinic 128/91  - Monitor  - Cont amlodipine    Depression/anxiety:   - Escitalopram    Multifocal Ischemic CVA: Etio embolic vs. Perioperative initially with symptoms b/l UE paresis R>L, thought 2/2 new onset afib.   - SBP goal <140  - Continue warfarin and rosuvastatin    AFib with RVR:  Metop and amio d/c'd due to intermittent bradycardia.   - Zio patch with EP follow up  - Warfarin      Rheumatoid Arthritis:  Previously treated with ritux.   - Will eventually require follow up     Malnutrition: PEG/J removed 12/24 with improved PO intake, appetite   - MV  - Folate, B6, B12 supplements    GIB: Severe progressive hypotension with resultant sally/asystole 2/2 GIB in setting of anticoagulation requiring massive transfusion protocol in October during hospitalization and found to be secondary to ulceration near PEG bumper site.   - PPI  BID    Hypothyroidism: TSH 3.17 on 11/19  - Levothyroxine 25 mcg daily    Health Literacy: Reviewed medications with patient and wife today several times over and reclarified which medications were anti-infective in which medications were antirejection.  There was some significant confusion and difficulty with editing meds and doses.  Has follow-up with pharmacy this coming week to clarify and reteach.      Maintenance:  Derm Exam: Due Annually  Colon Ca Screening:  DEXA:   Imms:   [id number]:  unknown; Prevnar 13: unknown  TDaP:  2018  Shingrix: 2018, 2019  Evusheld administered 1/7/22    The patient was seen and evaluated by me. We discussed the risks and benefits of receiving EVUSHELD, as well as alternative treatments. The Emergency Use Administration (EUA) was provided to the patient for review and an opportunity for questions was provided. Patient wishes to proceed with EVUSHELD.        CHANGES TODAY    - Will need EP follow up and zio patch    - Hold on pulm rehab and will start PT instead    - Will potentially need to dose reduce fluconazole if Cr remains elevated    - Medications extensively reviewed, has follow up with pharmacy will need to reinforce education of medications at every visit    - Stop lasix    I personally spent 40 minutes in documentation, the interview and exam, and review of the chart/labs/imaging on 1/7/22            Janee Johns MD  [facility]  [facility]   Pulmonary Transplant   Pre Transplant Coordinator: Margoth Medeiros  Ph: [phone]  Post Transplant Coordinator: Mady Marin  Fax: [phone]  Ph: [phone]        Transplant Coordinator Note    Reason for visit: Post lung transplant follow up visit   Coordinator: Present   Caregiver:  Peg BROWN    Health concerns addressed today:  1. Respiratory - shortness of breath with activity - maybe a little worse than when in rehab. No cough, no congestion. No wheezing, no sputum  production  2. Feeling fatigued/weak - can't walk as far as did in rehab, doesn't feel like able to do as much.   3. Has tremors - comes and goes, worse later in the day.    4. GI nausea - improved with zofran. Denies heartburn. Bms loose, usually 2 BM/day. No urgency/accidents.   5. No fevers, chills, no night sweats.   6. ENT: no issues, at baseline    Activity/rehab: home PT until stronger, then pulmonary rehab  Oxygen needs: room air  Pain management/RX: some pain with big breaths - not new.   Next Bronch due: deferred d/t COVID  Risk Criteria Labs:  due POD 90  CMV status:D-/R-  EBV status: D+/R+  PJP prophylactic: Bactrim    COVID:  1. COVID-19 infection (yes/no, date of most recent positive test):   2. Status/instructions given about COVID-19 vaccine:     Pt Education: medications (use/dose/side effects), how/when to call coordinator, frequency of labs, s/s of infection/rejection, call prior to starting any new medications, lab/vital sign book    Health Maintenance:     Last colonoscopy:     Next colonoscopy due:     Dermatology:    Vaccinations this visit:     Labs, CXR, PFTs reviewed with patient  Medication record reviewed and reconciled  Questions and concerns addressed    Patient Instructions  1. Continue to hydrate with 60-70 oz fluids daily.  2. Continue to exercise daily or most days of the week.  3. Decrease your tacrolimus dose to 1.5mg in the AM and 1mg in the PM.   4. Stop taking furosemide (Lasix).   5. Your current dose of prednisone is 10mg in the AM and 7.5mg in the PM.   Your taper is below   Date AM dose (mg) PM dose (mg)        1/2/22 10 7.5   1/30/22 7.5 7.5   2/27/22 7.5 5   3/27/22 5 5   4/24/22 5 2.5       6. It doesn't seem like the COVID vaccine is working well in lung transplant patients. A number of lung transplant patients have gotten sick with COVID even after receiving the vaccines.  Based on our recent experience, it can be life-threatening to get COVID  even after being  vaccinated. Please continue to act like you did not get the COVID vaccine - social distancing, wearing a mask, good hand hygiene, etc. If the people around you are vaccinated, it will help reduce the risk of you getting COVID. All members of your household should be vaccinated.      Next transplant clinic appointment: Monday 1/10 with CXR, labs and PFTs  Next lab draw: Monday, 1/10      AVS printed at time of check out            Again, thank you for allowing me to participate in the care of your patient.        Sincerely,        Janee Johns MD

## 2022-01-08 NOTE — TELEPHONE ENCOUNTER
Patient calls on-call coordinator reporting tacrolimus dose was changed today in clinic, but updated Rx not sent and he needs 0.5 mg capsules. Updated Rx sent to the [facility] pharmacy, he will  tomorrow.

## 2022-01-09 ENCOUNTER — TELEPHONE (OUTPATIENT)
Dept: TRANSPLANT | Facility: CLINIC | Age: 57
End: 2022-01-09
Payer: COMMERCIAL

## 2022-01-09 NOTE — TELEPHONE ENCOUNTER
Dr. Mckeon requesting to see patient tomorrow at 1000 instead of 3pm tomorrow. Pt agreeable, changed appointments (x-ray full, but added to lab appointment notes to do before appt if possible). Pt to be there by 0845 tomorrow and reminded to take tacrolimus around 8:45 pm tonight for 12 hour trough tomorrow.

## 2022-01-10 ENCOUNTER — ANCILLARY PROCEDURE (OUTPATIENT)
Dept: GENERAL RADIOLOGY | Facility: CLINIC | Age: 57
End: 2022-01-10
Attending: INTERNAL MEDICINE
Payer: COMMERCIAL

## 2022-01-10 ENCOUNTER — OFFICE VISIT (OUTPATIENT)
Dept: PULMONOLOGY | Facility: CLINIC | Age: 57
End: 2022-01-10
Attending: INTERNAL MEDICINE
Payer: COMMERCIAL

## 2022-01-10 ENCOUNTER — ANTICOAGULATION THERAPY VISIT (OUTPATIENT)
Dept: ANTICOAGULATION | Facility: CLINIC | Age: 57
End: 2022-01-10

## 2022-01-10 ENCOUNTER — LAB (OUTPATIENT)
Dept: LAB | Facility: CLINIC | Age: 57
End: 2022-01-10
Payer: COMMERCIAL

## 2022-01-10 VITALS
HEART RATE: 73 BPM | RESPIRATION RATE: 17 BRPM | DIASTOLIC BLOOD PRESSURE: 76 MMHG | SYSTOLIC BLOOD PRESSURE: 133 MMHG | HEIGHT: 65 IN | OXYGEN SATURATION: 97 % | WEIGHT: 143 LBS | BODY MASS INDEX: 23.82 KG/M2

## 2022-01-10 DIAGNOSIS — D84.9 IMMUNOSUPPRESSED STATUS (H): ICD-10-CM

## 2022-01-10 DIAGNOSIS — Z94.2 LUNG REPLACED BY TRANSPLANT (H): ICD-10-CM

## 2022-01-10 DIAGNOSIS — Z94.2 LUNG REPLACED BY TRANSPLANT (H): Primary | ICD-10-CM

## 2022-01-10 DIAGNOSIS — I48.91 ATRIAL FIBRILLATION, UNSPECIFIED TYPE (H): Primary | ICD-10-CM

## 2022-01-10 DIAGNOSIS — I48.91 ATRIAL FIBRILLATION, UNSPECIFIED TYPE (H): ICD-10-CM

## 2022-01-10 LAB
ANION GAP SERPL CALCULATED.3IONS-SCNC: 12 MMOL/L (ref 3–14)
BUN SERPL-MCNC: 23 MG/DL (ref 7–30)
CALCIUM SERPL-MCNC: 9.4 MG/DL (ref 8.5–10.1)
CHLORIDE BLD-SCNC: 106 MMOL/L (ref 94–109)
CMV DNA SPEC NAA+PROBE-ACNC: NOT DETECTED IU/ML
CO2 SERPL-SCNC: 24 MMOL/L (ref 20–32)
CREAT SERPL-MCNC: 1.19 MG/DL (ref 0.66–1.25)
EBV DNA # SPEC NAA+PROBE: NOT DETECTED COPIES/ML
ERYTHROCYTE [DISTWIDTH] IN BLOOD BY AUTOMATED COUNT: 14.6 % (ref 10–15)
EXPTIME-PRE: 7.69 SEC
FEF2575-%PRED-PRE: 21 %
FEF2575-PRE: 0.61 L/SEC
FEF2575-PRED: 2.83 L/SEC
FEFMAX-%PRED-PRE: 52 %
FEFMAX-PRE: 4.35 L/SEC
FEFMAX-PRED: 8.37 L/SEC
FEV1-%PRED-PRE: 39 %
FEV1-PRE: 1.24 L
FEV1FEV6-PRE: 63 %
FEV1FEV6-PRED: 80 %
FEV1FVC-PRE: 61 %
FEV1FVC-PRED: 79 %
FIFMAX-PRE: 4.27 L/SEC
FVC-%PRED-PRE: 51 %
FVC-PRE: 2.03 L
FVC-PRED: 3.98 L
GFR SERPL CREATININE-BSD FRML MDRD: 72 ML/MIN/1.73M2
GLUCOSE BLD-MCNC: 94 MG/DL (ref 70–99)
HBV CORE AB SERPL QL IA: NONREACTIVE
HBV SURFACE AG SERPL QL IA: NONREACTIVE
HCT VFR BLD AUTO: 40.6 % (ref 40–53)
HCV AB SERPL QL IA: NONREACTIVE
HGB BLD-MCNC: 12.9 G/DL (ref 13.3–17.7)
HIV 1+2 AB+HIV1 P24 AG SERPL QL IA: NONREACTIVE
INR PPP: 2.25 (ref 0.85–1.15)
MAGNESIUM SERPL-MCNC: 1.5 MG/DL (ref 1.6–2.3)
MCH RBC QN AUTO: 26.5 PG (ref 26.5–33)
MCHC RBC AUTO-ENTMCNC: 31.8 G/DL (ref 31.5–36.5)
MCV RBC AUTO: 84 FL (ref 78–100)
PLATELET # BLD AUTO: 168 10E3/UL (ref 150–450)
POTASSIUM BLD-SCNC: 3.7 MMOL/L (ref 3.4–5.3)
RBC # BLD AUTO: 4.86 10E6/UL (ref 4.4–5.9)
SODIUM SERPL-SCNC: 142 MMOL/L (ref 133–144)
TACROLIMUS BLD-MCNC: 12.7 UG/L (ref 5–15)
TME LAST DOSE: NORMAL H
TME LAST DOSE: NORMAL H
WBC # BLD AUTO: 10.7 10E3/UL (ref 4–11)

## 2022-01-10 PROCEDURE — 86833 HLA CLASS II HIGH DEFIN QUAL: CPT | Performed by: PATHOLOGY

## 2022-01-10 PROCEDURE — 80048 BASIC METABOLIC PNL TOTAL CA: CPT | Performed by: PATHOLOGY

## 2022-01-10 PROCEDURE — 87389 HIV-1 AG W/HIV-1&-2 AB AG IA: CPT | Mod: 90 | Performed by: PATHOLOGY

## 2022-01-10 PROCEDURE — 99214 OFFICE O/P EST MOD 30 MIN: CPT | Mod: 24 | Performed by: INTERNAL MEDICINE

## 2022-01-10 PROCEDURE — 80197 ASSAY OF TACROLIMUS: CPT | Mod: 90 | Performed by: PATHOLOGY

## 2022-01-10 PROCEDURE — 99000 SPECIMEN HANDLING OFFICE-LAB: CPT | Performed by: PATHOLOGY

## 2022-01-10 PROCEDURE — 71046 X-RAY EXAM CHEST 2 VIEWS: CPT | Mod: GC | Performed by: RADIOLOGY

## 2022-01-10 PROCEDURE — 85027 COMPLETE CBC AUTOMATED: CPT | Performed by: PATHOLOGY

## 2022-01-10 PROCEDURE — 83735 ASSAY OF MAGNESIUM: CPT | Performed by: PATHOLOGY

## 2022-01-10 PROCEDURE — 86704 HEP B CORE ANTIBODY TOTAL: CPT | Mod: 90 | Performed by: PATHOLOGY

## 2022-01-10 PROCEDURE — G0463 HOSPITAL OUTPT CLINIC VISIT: HCPCS | Mod: 25

## 2022-01-10 PROCEDURE — 94375 RESPIRATORY FLOW VOLUME LOOP: CPT | Performed by: INTERNAL MEDICINE

## 2022-01-10 PROCEDURE — 85610 PROTHROMBIN TIME: CPT | Performed by: PATHOLOGY

## 2022-01-10 PROCEDURE — 86832 HLA CLASS I HIGH DEFIN QUAL: CPT | Performed by: PATHOLOGY

## 2022-01-10 PROCEDURE — 86803 HEPATITIS C AB TEST: CPT | Mod: 90 | Performed by: PATHOLOGY

## 2022-01-10 PROCEDURE — 87340 HEPATITIS B SURFACE AG IA: CPT | Mod: 90 | Performed by: PATHOLOGY

## 2022-01-10 PROCEDURE — 36415 COLL VENOUS BLD VENIPUNCTURE: CPT | Performed by: PATHOLOGY

## 2022-01-10 ASSESSMENT — PAIN SCALES - GENERAL: PAINLEVEL: MILD PAIN (2)

## 2022-01-10 ASSESSMENT — MIFFLIN-ST. JEOR: SCORE: 1402.34

## 2022-01-10 NOTE — NURSING NOTE
Chief Complaint   Patient presents with     RECHECK     Return Lung TX     Medications reviewed and vital signs taken.   Irma Montiel, CMA

## 2022-01-10 NOTE — PROGRESS NOTES
"Transplant Coordinator Note    Reason for visit: Post lung transplant follow up visit   Coordinator: Present   Caregiver:      Health concerns addressed today:  1. Respiratory - shortness of breath with activity  2. GI: don't feel hungry, nausea improved with zofran. Sometimes gets full easily, sometimes better. Loose stools - 2 BM/day  3. ENT: no issues at baseline  4. Sleeping well at night with trazodone/melatonin   5. Checking BGs at home - \"pretty good\"    Activity/rehab: home PT, went out walking yesterday. Using walker for balance  Oxygen needs: room air  Pain management/RX: nothing new  Next Bronch due: deferred d/t COVID  CMV status: D-/R-  EBV status: D+/R+  Post op AFIB/follow up with EP: [facility]  AC/asa: Coumadin  PJP prophylactic: Bactrim     COVID:  1. COVID-19 infection (yes/no, date of most recent positive test): no  2. Status/instructions given about COVID-19 vaccine: Jerry on 1/7/2022    Pt Education: medications (use/dose/side effects), how/when to call coordinator, frequency of labs, s/s of infection/rejection, call prior to starting any new medications, lab/vital sign book    Health Maintenance:     Last colonoscopy:     Next colonoscopy due:     Dermatology:    Vaccinations this visit:     Labs, CXR, PFTs reviewed with patient  Medication record reviewed and reconciled  Questions and concerns addressed    Patient Instructions  1. Continue to hydrate with 60-70 oz fluids daily.  2. Continue to exercise daily or most days of the week.  3. It doesn't seem like the COVID vaccine is working well in lung transplant patients. A number of lung transplant patients have gotten sick with COVID even after receiving the vaccines.  Based on our recent experience, it can be life-threatening to get COVID  even after being vaccinated. Please continue to act like you did not get the COVID vaccine - social distancing, wearing a mask, good hand hygiene, etc. If the people around you are vaccinated, it " will help reduce the risk of you getting COVID. All members of your household should be vaccinated.  4. Decrease Lantus to 3 units daily. Continue monitoring your blood sugars.    Next transplant clinic appointment: 1 week with CXR, labs and PFTs  Next lab draw: 1 week      AVS printed at time of check out

## 2022-01-10 NOTE — PATIENT INSTRUCTIONS
Patient Instructions  1. Continue to hydrate with 60-70 oz fluids daily.  2. Continue to exercise daily or most days of the week.  3. It doesn't seem like the COVID vaccine is working well in lung transplant patients. A number of lung transplant patients have gotten sick with COVID even after receiving the vaccines.  Based on our recent experience, it can be life-threatening to get COVID  even after being vaccinated. Please continue to act like you did not get the COVID vaccine - social distancing, wearing a mask, good hand hygiene, etc. If the people around you are vaccinated, it will help reduce the risk of you getting COVID. All members of your household should be vaccinated.  4. Decrease Lantus to 3 units daily. Continue monitoring your blood sugars.    Next transplant clinic appointment: 1 week with CXR, labs and PFTs  Next lab draw: 1 week      ~~~~~~~~~~~~~~~~~~~~~~~~~    Thoracic Transplant Office phone [phone], fax [phone]    Office Hours 8:30 - 5:00     For after-hours urgent issues, please dial [phone], and ask to speak with the Thoracic Transplant Coordinator On-Call.  --------------------  To expedite your medication refill(s), please contact your pharmacy and have them fax a refill request to: [phone]  .   *Please allow 3 business days for routine medication refills.  *Please allow 5 business days for controlled substance medication refills.    **For Diabetic medications and supplies refill(s), please contact your pharmacy and have them contact your Endocrine team.  --------------------  For scheduling appointments call [phone].  --------------------  Please Note: If you are active on [facility], all future test results will be sent by [facility] message only, and will no longer be called to patient. You may also receive communication directly from your physician.    
31.2

## 2022-01-10 NOTE — PROGRESS NOTES
ANTICOAGULATION MANAGEMENT     Edson Thornton 56 year old male is on warfarin with therapeutic INR result. (Goal INR 2.0-3.0)    Recent labs: (last 7 days)     01/10/22  0919   INR 2.25*       ASSESSMENT     Source(s): Chart Review and Patient/Caregiver Call     Warfarin doses taken: Less warfarin taken than planned which may be affecting INR and Warfarin recently held for supra therapeutic INR which may be affecting INR.  Bret reports he has not taken any warfarin since 1/4/2022  Diet: No new diet changes identified  New illness, injury, or hospitalization: No  Medication/supplement changes: decrease in insulin dose  Signs or symptoms of bleeding or clotting: No  Previous INR: Supratherapeutic  Additional findings: None     PLAN     Recommended plan for ongoing change(s) affecting INR     Dosing Instructions:  Decrease your warfarin dose (36.4% change) with next INR in 2 days       Summary  As of 1/10/2022    Full warfarin instructions:  1/11: 0.5 mg; Otherwise 1 mg every day   Next INR check:  1/12/2022             Telephone call with Edson who agrees to plan and repeated back plan correctly    Lab visit scheduled    Education provided: Please call back if any changes to your diet, medications or how you've been taking warfarin, Warfarin tablet strength--verified with Bret that he has pink 1 mg tablets of warfarin  .Contact [phone] with any changes, questions or concerns.     Plan made with [facility] Pharmacist Usha Chau RN  Anticoagulation Clinic  1/10/2022    _______________________________________________________________________     Anticoagulation Episode Summary     Current INR goal:  2.0-3.0   TTR:  60.6 % (2 d)   Target end date:  Indefinite   Send INR reminders to:  [facility]    Indications    Atrial fibrillation  unspecified type (H) [I48.91]           Comments:  [facility] P: [phone]  Transplant Labs twice a week  Staying Local: [facility] then back  to [place]         Anticoagulation Care Providers     Provider Role Specialty Phone number    Abiodun Woods MD Referring Pulmonary Disease [phone]

## 2022-01-10 NOTE — LETTER
1/10/2022         RE: Edson Thornton  [address] [place] [address]        Dear Colleague,    Thank you for referring your patient, Edson Thornton, to the [facility] FOR LUNG SCIENCE AND [facility] [place]. Please see a copy of my visit note below.    [facility] Lung Science and [facility]  Pulmonary Transplant Follow Up Visit  January 10, 2022    Reason for Visit  Edson Thornton is a 56 year old male who is being seen for RECHECK (Return Lung TX )           Lung Tx Summary:     Transplants:  10/16/2021 (Lung), Postoperative day:  86    Edson Thornton is a 56 year old male who underwent BSLT transplant on 10/16/2021 (Lung) for NSIP/ILD with bronchiectasis, currently postoperative day:  86. He has a PMH of hypogammglobulinemia, and steroid induced DM. His post transplant course is complicated by early encephalopathy with CVA, afib RVR, GIB, and candidemia/candida empyema, with cardiac arrest on 11/2 from acute GIB. He was subsequently resuscitated and ultimately discharged to ARU 12/13- 12/30.   He was last seen by  on 1/7/22         Interval Histories:   Odell 10, 2022   Overall no new complaints. He comes to clinic in wheel chair. He was able to walk a long hallway at the apartment with walker. Still uses walker in the apartment. No F/C/S. He has some SOB with standing and/or walking. He does not have much of a cough/wheezing.   No CP. No leg swelling  Sleep fair.  Appetite remains poor. BM's normal. No heart burn. He has nausea and it taking Zofran BID.   +paresthesias in the hands since surgery.   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 He has some 'double vision' due to this and leading to headaches.      January 6, 2022  Received 1L NS on 1/3/22 along with 2g of Mg. Doesn't feel like he's walking as far as he was in rehab and feels like his strength is regressing. Nausea is improved since last visit, no emesis. No fevers, no  chills, no night sweats. No heartburn/acid reflux.  No cough, no wheezing, minimal sputum production, does feel dyspneic with minimal exertion and thinks this may be worse than previous but also thinks that he may be more deconditioned. Stools are getting looser than what they were, usually about twice a day, not liquid. Feels full all the time, but denies nausea, vomiting especially with addition of Zofran. Has a little tenderness along left side of incision with inspiration, chronic since surgery. No sternal popping or clicking. +paresthesias in the hands since surgery.     Exercise  Previously pulmonary rehab, will be transitioning to physical therapy instead based on Pulm rehab recommendations      The patient was seen and examined by Pan Mckeon MD     A complete ROS was otherwise negative except as noted in the HPI.           Medications:   Medications were reviewed and read talked to patient and fiancé extensively throughout the visit and updated    Current Outpatient Medications   Medication     acetaminophen (TYLENOL) 325 MG tablet     amLODIPine (NORVASC) 5 MG tablet     calcium carbonate 600 mg-vitamin D 400 units (CALTRATE) 600-400 MG-UNIT per tablet     escitalopram (LEXAPRO) 20 MG tablet     fluconazole (DIFLUCAN) 400-0.9 MG/200ML-% infusion     fluticasone (FLONASE) 50 MCG/ACT nasal spray     folic acid-vit B6-vit B12 (FOLGARD) 0.8-10-0.115 MG TABS per tablet     insulin glargine (LANTUS PEN) 100 UNIT/ML pen     levalbuterol (XOPENEX HFA) 45 MCG/ACT inhaler     levofloxacin (LEVAQUIN) 500 MG tablet     levothyroxine (SYNTHROID/LEVOTHROID) 25 MCG tablet     magnesium oxide (MAG-OX) 400 MG tablet     melatonin 10 MG TABS tablet     multivitamin w/minerals (THERA-VIT-M) tablet     mycophenolate (GENERIC EQUIVALENT) 250 MG capsule     nystatin (MYCOSTATIN) 943280 UNIT/ML suspension     ondansetron (ZOFRAN-ODT) 4 MG ODT tab     pantoprazole (PROTONIX) 40 MG EC tablet     predniSONE (DELTASONE) 5 MG  tablet     rosuvastatin (CRESTOR) 10 MG tablet     sulfamethoxazole-trimethoprim (BACTRIM) 400-80 MG tablet     tacrolimus (GENERIC EQUIVALENT) 0.5 MG capsule     tacrolimus (GENERIC EQUIVALENT) 1 MG capsule     traZODone (DESYREL) 50 MG tablet     warfarin ANTICOAGULANT (COUMADIN) 1 MG tablet     warfarin ANTICOAGULANT (COUMADIN) 1 MG tablet     No current facility-administered medications for this visit.            Allergies:     No Known Allergies         Past Medical and Past Surgical History:     Past Medical History:   Diagnosis Date     BRENNAN (acute kidney injury) (H) 10/17/2021     Anxiety      Depression      HLD (hyperlipidemia)      HTN (hypertension)      Hypothyroidism      ILD (interstitial lung disease) (H)      SALTY on CPAP      Oxygen dependent     BL 4L since ~6/2021     Rheumatoid arthritis (H)     signs ~5/2020, dx 5/2021     S/P lung transplant (H) 10/16/2021     Shock liver 10/17/2021     Steroid-induced hyperglycemia      Traction bronchiectasis (H)        Past Surgical History:   Procedure Laterality Date     COLONOSCOPY W/ BIOPSIES AND POLYPECTOMY  07/21/2020     CV CORONARY ANGIOGRAM N/A 09/08/2021    Procedure: Coronary Angiogram with possible intervention;  Surgeon: Jovon Bullock MD;  Location:  HEART CARDIAC CATH LAB     CV RIGHT HEART CATH MEASUREMENTS RECORDED N/A 09/08/2021    Procedure: Right Heart Cath;  Surgeon: Jovon Bullock MD;  Location: [facility] CARDIAC CATH LAB     ESOPHAGOSCOPY, GASTROSCOPY, DUODENOSCOPY (EGD), COMBINED N/A 10/23/2021    Procedure: ESOPHAGOGASTRODUODENOSCOPY (EGD);  Surgeon: Miquel Pisano MD;  Location:  GI     ESOPHAGOSCOPY, GASTROSCOPY, DUODENOSCOPY (EGD), COMBINED N/A 11/02/2021    Procedure: ESOPHAGOGASTRODUODENOSCOPY (EGD);  Surgeon: Daniel Ortiz MD;  Location:  GI     ESOPHAGOSCOPY, GASTROSCOPY, DUODENOSCOPY (EGD), COMBINED N/A 11/5/2021    Procedure: ESOPHAGOGASTRODUODENOSCOPY (EGD);  Surgeon: Ronnell Hernandez  MD Les;  Location: [facility] GI     EXTRACTION(S) DENTAL N/A 09/22/2021    Procedure: EXTRACTION tooth #19;  Surgeon: Deepak Tobin DDS;  Location: [facility] OR     HERNIA REPAIR       IR CHEST TUBE PLACEMENT NON-TUNNELLED LEFT  11/03/2021     PICC TRIPLE LUMEN PLACEMENT Left 11/04/2021    5FR TL PICC. Right non occlusive thrombus subclavian vein.     REPLACE GASTROJEJUNOSTOMY TUBE, PERCUTANEOUS N/A 11/9/2021    Procedure: REPLACEMENT, GASTROJEJUNOSTOMY TUBE, PERCUTANEOUS;  Surgeon: Hernando Rodriguez MD;  Location: [facility] GI     right acl       TRACHEOSTOMY PERCUTANEOUS N/A 10/29/2021    Procedure: Percutaneous Tracheostomy,;  Surgeon: Celine Jenkins MD;  Location: [facility] OR     TRANSPLANT LUNG RECIPIENT SINGLE X2 Bilateral 10/16/2021    Procedure: TRANSPLANT, LUNG, RECIPIENT, BILATERAL, Bronchoscopy, on-pump perfusion, bilateral clamshell sternotomy;  Surgeon: Yanick Corral MD;  Location: [facility] OR     XR ACROMIOCLAVICULAR JOINT BILATERAL               Social History:     Social History     Socioeconomic History     Marital status: Single     Spouse name: Not on file     Number of children: Not on file     Years of education: Not on file     Highest education level: Not on file   Occupational History     Not on file   Tobacco Use     Smoking status: Never Smoker     Smokeless tobacco: Never Used   Substance and Sexual Activity     Alcohol use: Never     Drug use: Never     Sexual activity: Not on file   Other Topics Concern     Parent/sibling w/ CABG, MI or angioplasty before 65F 55M? Not Asked   Social History Narrative     Not on file     Social Determinants of Health     Financial Resource Strain: Not on file   Food Insecurity: Not on file   Transportation Needs: Not on file   Physical Activity: Not on file   Stress: Not on file   Social Connections: Not on file   Intimate Partner Violence: Not on file   Housing Stability: Not on file     Social Updates:          Rejection and Infection History     Rejection  "Hx    DATE INDICATION  PATH BAL/MICRO TREATMENT                Infectious Hx           Exam:     /76   Pulse 73   Resp 17   Ht 1.646 m (5' 4.8\")   Wt 64.9 kg (143 lb)   SpO2 97%   BMI 23.94 kg/m    Body mass index is 23.94 kg/m .    GENERAL APPEARANCE: Well developed, well nourished, alert, appears frail and deconditioned mildly disheveled  EYES: PERRL, EOMI  HENT: Nasal mucosa with no edema and no hyperemia. No nasal polyps.  EARS: Canals clear, TMs normal  MOUTH: Oral mucosa is moist, without any lesions, no tonsillar enlargement, no oropharyngeal exudate.  RESP: normal percussion, good air flow throughout.  No crackles. No rhonchi. No wheezes.  CV: Normal S1, S2, regular rhythm, tachycardic, no murmur.  No rub. No gallop. No LE edema.   ABDOMEN:  Bowel sounds normal, soft, nontender, no HSM or masses.   MS: extremities normal. + clubbing. No cyanosis.  SKIN: no rash on limited exam  NEURO: Significant baseline tremor  PSYCH: mentation appears normal. and affect normal to slightly flat         Data:     Results:  Recent Results (from the past 168 hour(s))   Basic metabolic panel    Collection Time: 01/05/22 10:30 AM   Result Value Ref Range    Sodium 140 133 - 144 mmol/L    Potassium 3.9 3.4 - 5.3 mmol/L    Chloride 104 94 - 109 mmol/L    Carbon Dioxide (CO2) 27 20 - 32 mmol/L    Anion Gap 9 3 - 14 mmol/L    Urea Nitrogen 27 7 - 30 mg/dL    Creatinine 1.45 (H) 0.66 - 1.25 mg/dL    Calcium 9.6 8.5 - 10.1 mg/dL    Glucose 86 70 - 99 mg/dL    GFR Estimate 57 (L) >60 mL/min/1.73m2   Magnesium    Collection Time: 01/05/22 10:30 AM   Result Value Ref Range    Magnesium 1.4 (L) 1.6 - 2.3 mg/dL   Phosphorus    Collection Time: 01/05/22 10:30 AM   Result Value Ref Range    Phosphorus 2.4 (L) 2.5 - 4.5 mg/dL   Tacrolimus by Tandem Mass Spectrometry    Collection Time: 01/05/22 10:30 AM   Result Value Ref Range    Tacrolimus by Tandem Mass Spectrometry 29.9 (HH) 5.0 - 15.0 ug/L    Tacrolimus Last Dose Date      " Tacrolimus Last Dose Time     INR    Collection Time: 01/05/22 10:30 AM   Result Value Ref Range    INR 4.85 (H) 0.85 - 1.15   CBC with platelets and differential    Collection Time: 01/05/22 10:30 AM   Result Value Ref Range    WBC Count 11.4 (H) 4.0 - 11.0 10e3/uL    RBC Count 4.58 4.40 - 5.90 10e6/uL    Hemoglobin 12.4 (L) 13.3 - 17.7 g/dL    Hematocrit 38.6 (L) 40.0 - 53.0 %    MCV 84 78 - 100 fL    MCH 27.1 26.5 - 33.0 pg    MCHC 32.1 31.5 - 36.5 g/dL    RDW 14.3 10.0 - 15.0 %    Platelet Count 197 150 - 450 10e3/uL    % Neutrophils 82 %    % Lymphocytes 8 %    % Monocytes 8 %    % Eosinophils 0 %    % Basophils 0 %    % Immature Granulocytes 2 %    NRBCs per 100 WBC 0 <1 /100    Absolute Neutrophils 9.2 (H) 1.6 - 8.3 10e3/uL    Absolute Lymphocytes 0.9 0.8 - 5.3 10e3/uL    Absolute Monocytes 0.9 0.0 - 1.3 10e3/uL    Absolute Eosinophils 0.0 0.0 - 0.7 10e3/uL    Absolute Basophils 0.1 0.0 - 0.2 10e3/uL    Absolute Immature Granulocytes 0.2 <=0.4 10e3/uL    Absolute NRBCs 0.0 10e3/uL   CMV Quantitative, PCR (Blood)    Collection Time: 01/07/22  8:36 AM    Specimen: Hand, Right; Blood   Result Value Ref Range    CMV DNA IU/mL Not Detected Not Detected IU/mL   Basic metabolic panel    Collection Time: 01/07/22  8:36 AM   Result Value Ref Range    Sodium 142 133 - 144 mmol/L    Potassium 3.8 3.4 - 5.3 mmol/L    Chloride 104 94 - 109 mmol/L    Carbon Dioxide (CO2) 25 20 - 32 mmol/L    Anion Gap 13 3 - 14 mmol/L    Urea Nitrogen 30 7 - 30 mg/dL    Creatinine 1.67 (H) 0.66 - 1.25 mg/dL    Calcium 9.9 8.5 - 10.1 mg/dL    Glucose 84 70 - 99 mg/dL    GFR Estimate 48 (L) >60 mL/min/1.73m2   Magnesium    Collection Time: 01/07/22  8:36 AM   Result Value Ref Range    Magnesium 1.6 1.6 - 2.3 mg/dL   CBC with platelets    Collection Time: 01/07/22  8:36 AM   Result Value Ref Range    WBC Count 13.5 (H) 4.0 - 11.0 10e3/uL    RBC Count 4.82 4.40 - 5.90 10e6/uL    Hemoglobin 12.8 (L) 13.3 - 17.7 g/dL    Hematocrit 39.4 (L) 40.0  - 53.0 %    MCV 82 78 - 100 fL    MCH 26.6 26.5 - 33.0 pg    MCHC 32.5 31.5 - 36.5 g/dL    RDW 14.4 10.0 - 15.0 %    Platelet Count 185 150 - 450 10e3/uL   CMV DNA quantification    Collection Time: 01/07/22  8:36 AM    Specimen: Hand, Right; Blood   Result Value Ref Range    CMV DNA IU/mL Not Detected Not Detected IU/mL   INR    Collection Time: 01/07/22  8:36 AM   Result Value Ref Range    INR 3.82 (H) 0.85 - 1.15   Tacrolimus level    Collection Time: 01/07/22  8:36 AM   Result Value Ref Range    Tacrolimus by Tandem Mass Spectrometry 12.9 5.0 - 15.0 ug/L    Tacrolimus Last Dose Date 1/5/2022     Tacrolimus Last Dose Time  8:00 PM    Basic metabolic panel    Collection Time: 01/10/22  9:19 AM   Result Value Ref Range    Sodium 142 133 - 144 mmol/L    Potassium 3.7 3.4 - 5.3 mmol/L    Chloride 106 94 - 109 mmol/L    Carbon Dioxide (CO2) 24 20 - 32 mmol/L    Anion Gap 12 3 - 14 mmol/L    Urea Nitrogen 23 7 - 30 mg/dL    Creatinine 1.19 0.66 - 1.25 mg/dL    Calcium 9.4 8.5 - 10.1 mg/dL    Glucose 94 70 - 99 mg/dL    GFR Estimate 72 >60 mL/min/1.73m2   Magnesium    Collection Time: 01/10/22  9:19 AM   Result Value Ref Range    Magnesium 1.5 (L) 1.6 - 2.3 mg/dL   CBC with platelets    Collection Time: 01/10/22  9:19 AM   Result Value Ref Range    WBC Count 10.7 4.0 - 11.0 10e3/uL    RBC Count 4.86 4.40 - 5.90 10e6/uL    Hemoglobin 12.9 (L) 13.3 - 17.7 g/dL    Hematocrit 40.6 40.0 - 53.0 %    MCV 84 78 - 100 fL    MCH 26.5 26.5 - 33.0 pg    MCHC 31.8 31.5 - 36.5 g/dL    RDW 14.6 10.0 - 15.0 %    Platelet Count 168 150 - 450 10e3/uL   INR    Collection Time: 01/10/22  9:19 AM   Result Value Ref Range    INR 2.25 (H) 0.85 - 1.15   General PFT Lab (Please always keep checked)    Collection Time: 01/10/22  9:33 AM   Result Value Ref Range    FVC-Pred 3.98 L    FVC-Pre 2.03 L    FVC-%Pred-Pre 51 %    FEV1-Pre 1.24 L    FEV1-%Pred-Pre 39 %    FEV1FVC-Pred 79 %    FEV1FVC-Pre 61 %    FEFMax-Pred 8.37 L/sec    FEFMax-Pre 4.35  L/sec    FEFMax-%Pred-Pre 52 %    FEF2575-Pred 2.83 L/sec    FEF2575-Pre 0.61 L/sec    BDR4212-%Pred-Pre 21 %    ExpTime-Pre 7.69 sec    FIFMax-Pre 4.27 L/sec    FEV1FEV6-Pred 80 %    FEV1FEV6-Pre 63 %                       Date Place TLC (%) FVC (%) FEV1 (%) FEV1/FVC DLCO (%) Note   1/3/22 [facility]   2.11 52 1.27 40 60   First post txp    1/10/22 [facility]   2.03 51 1.24 39 61                                      Results as noted above.    Chest x-ray personally reviewed on January 10, 2022, noted bilateral small pleural effusions and small area of cavitation in the left lower chest space does not appear vastly different from last chest x-ray 4 days prior         Assessment and Plan:     Transplants:    Edson Thornton is a 56 year old male who underwent BSLT transplant on 10/16/2021 (Lung) for NSIP/ILD with bronchiectasis,  He has a PMH of hypogammglobulinemia, and steroid induced DM. His post transplant course is complicated by early encephalopathy with CVA, afib RVR, GIB, and candidemia/candida empyema, with cardiac arrest on 11/2 from acute GIB. He was subsequently resuscitated and ultimately discharged to ARU 12/13- 12/30.     PULMONARY    Transplant:       Allograft Function: Post op course very complicated with persistent b/l pleural effusions and candida empyema leading to left trapped lung and LLL cavitary lesion. Initial post txp PFTs are lower than expected.  Suspect at this point that he is also severely deconditioned.  Chest x-ray reviewed today has small bilateral pleural effusions we will need to monitor these for potential drainage if increased.  Left lower lobe cavitation does not appear grossly changed follow-up with transplant ID as noted below.  No PFTs today.  Suspect dyspnea is related to deconditioning.  -Switched from Pulm rehab to physical therapy per recommendations of Pulm rehab and in discussion with patient    Immunosuppression:  Induction therapy: Basilixmab and high dose steroids  Tac goal  8-12  MMF 1000 bid (avoid AZA given TPMT)   Prednisone 10/7.5    Date AM dose (mg) PM dose (mg)   1/2/22 10 7.5   1/30/22 7.5 7.5   2/27/22 7.5 5   3/27/22 5 5   4/24/22 5 2.5         Prophylaxis:   PJP: Bactrim  CMV: D -/R-: CMV monthly check  EBV: D+ /R+: EBV q 3 months   Fungal: Nystatin    Positive X-Match: Positive DSA, received 2 doses of ritux in June which is likely contributing to x-match result. 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   1/10/2022: Will continue to monitor every two weeks for now.    Concern for Strongyloides exposure: Pre transplant s/p ivermectin x 1 dose, donor and recip cultures NGTD.     LLL Cavitation: Klebsiella pneumoniae, Pseudomonas fluorescens/putida: Txp ID following has been on multiple rounds of antibiotics and currently on levofloxacin until 1/18/22 with follow up with Txp ID for same date with repeat chest CT at that time.     Disseminated Candida: Candidemia 10/20/21 and resp cultures along with candida empyema. Remains on IV fluconazole until 1/18/22, txp ID follow up.   1/10/2022: Cr is better but GFR is ~70. Will not reduce dose for now to avoid under treatment and especially as we will stopping it in 8days.    Hypogammaglobulinemia: IgG required repletion several times and despite requiring was unable to receive at ARU.   1/10/2022: Last IGG was 502 on 1/3, will monitor and not replace for now.    BRENNAN: Baseline Cr of 1, progressive increase over several weeks in ARU likely prerenal. Stopped lasix on 1/3. IVF given since last visit.  1/10/2022: Cr is better but not back to baseline. He is drinking 60 -70oz/day. Encouraged to continue to do so. Tac level pending.    Nausea/Vomiting: Has been having nausea and vomiting with eating, zofran is helping with symptoms.  And has not had any emesis since prior to last visit    Hyperglycemia: Steroid induced, on 5U lantus.    1/10/2022: Will decrease lantus to 3U/day. If BS are good at next visit consider stopping lantus.    SALTY: Pretxp was supposed to be on home CPAP.     HTN: BP previously elevated in clinic 128/91  - Monitor  - Cont amlodipine    Depression/anxiety:   - Escitalopram    Multifocal Ischemic CVA: Etio embolic vs. Perioperative initially with symptoms b/l UE paresis R>L, thought 2/2 new onset afib.   - SBP goal <140  - Continue warfarin and rosuvastatin    AFib with RVR:  Metop and amio d/c'd due to intermittent bradycardia.   - Zio patch with EP follow up - pending.  - Warfarin      Rheumatoid Arthritis:  Previously treated with ritux.   - Will eventually require follow up     Malnutrition: PEG/J removed 12/24 with improved PO intake, appetite   - MV  - Folate, B6, B12 supplements    GIB: Severe progressive hypotension with resultant sally/asystole 2/2 GIB in setting of anticoagulation requiring massive transfusion protocol in October during hospitalization and found to be secondary to ulceration near PEG bumper site.   - PPI BID, durn to be determined.    Hypothyroidism: TSH 3.17 on 11/19  - Levothyroxine 25 mcg daily    Health Literacy: Reviewed medications with patient and wife today several times over and reclarified which medications were anti-infective in which medications were antirejection.  There was some significant confusion and difficulty with editing meds and doses.  Has follow-up with pharmacy this coming week to clarify and reteach.      Maintenance:  Derm Exam: Due Annually  Colon Ca Screening:  DEXA:   Imms:   [id number]:  unknown; Prevnar 13: unknown  TDaP:  2018  Shingrix: 2018, 2019  Jerry administered 1/7/22      CHANGES TODAY  - Lantus to 3U  - Flu vaccine at next visit.  - C19 vaccine in three weeks.    I personally spent 40 minutes in documentation, the interview and exam, and review of the chart/labs/imaging on 1/10/22          Transplant Coordinator Note    Reason for visit: Post lung  "transplant follow up visit   Coordinator: Present   Caregiver:      Health concerns addressed today:  1. Respiratory - shortness of breath with activity  2. GI: don't feel hungry, nausea improved with zofran. Sometimes gets full easily, sometimes better. Loose stools - 2 BM/day  3. ENT: no issues at baseline  4. Sleeping well at night with trazodone/melatonin   5. Checking BGs at home - \"pretty good\"    Activity/rehab: home PT, went out walking yesterday. Using walker for balance  Oxygen needs: room air  Pain management/RX: nothing new  Next Bronch due: deferred d/t COVID  CMV status: D-/R-  EBV status: D+/R+  Post op AFIB/follow up with EP: A fib inpatient  AC/asa: Coumadin  PJP prophylactic: Bactrim     COVID:  1. COVID-19 infection (yes/no, date of most recent positive test): no  2. Status/instructions given about COVID-19 vaccine: Jerry on 1/7/2022    Pt Education: medications (use/dose/side effects), how/when to call coordinator, frequency of labs, s/s of infection/rejection, call prior to starting any new medications, lab/vital sign book    Health Maintenance:     Last colonoscopy:     Next colonoscopy due:     Dermatology:    Vaccinations this visit:     Labs, CXR, PFTs reviewed with patient  Medication record reviewed and reconciled  Questions and concerns addressed    Patient Instructions  1. Continue to hydrate with 60-70 oz fluids daily.  2. Continue to exercise daily or most days of the week.  3. It doesn't seem like the COVID vaccine is working well in lung transplant patients. A number of lung transplant patients have gotten sick with COVID even after receiving the vaccines.  Based on our recent experience, it can be life-threatening to get COVID  even after being vaccinated. Please continue to act like you did not get the COVID vaccine - social distancing, wearing a mask, good hand hygiene, etc. If the people around you are vaccinated, it will help reduce the risk of you getting COVID. All members " of your household should be vaccinated.  4. Decrease Lantus to 3 units daily. Continue monitoring your blood sugars.    Next transplant clinic appointment: 1 week with CXR, labs and PFTs  Next lab draw: 1 week      AVS printed at time of check out            Again, thank you for allowing me to participate in the care of your patient.        Sincerely,        Pan Mckeon MD

## 2022-01-10 NOTE — PROGRESS NOTES
[facility]  Pulmonary Transplant Follow Up Visit  January 10, 2022    Reason for Visit  Edson Thornton is a 56 year old male who is being seen for RECHECK (Return Lung TX )           Lung Tx Summary:     Transplants:  10/16/2021 (Lung), Postoperative day:  86    Edson Thornton is a 56 year old male who underwent BSLT transplant on 10/16/2021 (Lung) for NSIP/ILD with bronchiectasis, currently postoperative day:  86. He has a PMH of hypogammglobulinemia, and steroid induced DM. His post transplant course is complicated by early encephalopathy with CVA, afib RVR, GIB, and candidemia/candida empyema, with cardiac arrest on 11/2 from acute GIB. He was subsequently resuscitated and ultimately discharged to ARU 12/13- 12/30.   He was last seen by  on 1/7/22         Interval Histories:   Odell 10, 2022   Overall no new complaints. He comes to clinic in wheel chair. He was able to walk a long hallway at the apartment with walker. Still uses walker in the apartment. No F/C/S. He has some SOB with standing and/or walking. He does not have much of a cough/wheezing.   No CP. No leg swelling  Sleep fair.  Appetite remains poor. BM's normal. No heart burn. He has nausea and it taking Zofran BID.   +paresthesias in the hands since surgery.   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 He has some 'double vision' due to this and leading to headaches.      January 6, 2022  Received 1L NS on 1/3/22 along with 2g of Mg. Doesn't feel like he's walking as far as he was in rehab and feels like his strength is regressing. Nausea is improved since last visit, no emesis. No fevers, no chills, no night sweats. No heartburn/acid reflux.  No cough, no wheezing, minimal sputum production, does feel dyspneic with minimal exertion and thinks this may be worse than previous but also thinks that he may be more deconditioned. Stools are getting looser than what they were, usually about  twice a day, not liquid. Feels full all the time, but denies nausea, vomiting especially with addition of Zofran. Has a little tenderness along left side of incision with inspiration, chronic since surgery. No sternal popping or clicking. +paresthesias in the hands since surgery.     Exercise  Previously pulmonary rehab, will be transitioning to physical therapy instead based on Pulm rehab recommendations      The patient was seen and examined by Pan Mckeon MD     A complete ROS was otherwise negative except as noted in the HPI.           Medications:   Medications were reviewed and read talked to patient and fiancé extensively throughout the visit and updated    Current Outpatient Medications   Medication     acetaminophen (TYLENOL) 325 MG tablet     amLODIPine (NORVASC) 5 MG tablet     calcium carbonate 600 mg-vitamin D 400 units (CALTRATE) 600-400 MG-UNIT per tablet     escitalopram (LEXAPRO) 20 MG tablet     fluconazole (DIFLUCAN) 400-0.9 MG/200ML-% infusion     fluticasone (FLONASE) 50 MCG/ACT nasal spray     folic acid-vit B6-vit B12 (FOLGARD) 0.8-10-0.115 MG TABS per tablet     insulin glargine (LANTUS PEN) 100 UNIT/ML pen     levalbuterol (XOPENEX HFA) 45 MCG/ACT inhaler     levofloxacin (LEVAQUIN) 500 MG tablet     levothyroxine (SYNTHROID/LEVOTHROID) 25 MCG tablet     magnesium oxide (MAG-OX) 400 MG tablet     melatonin 10 MG TABS tablet     multivitamin w/minerals (THERA-VIT-M) tablet     mycophenolate (GENERIC EQUIVALENT) 250 MG capsule     nystatin (MYCOSTATIN) 949506 UNIT/ML suspension     ondansetron (ZOFRAN-ODT) 4 MG ODT tab     pantoprazole (PROTONIX) 40 MG EC tablet     predniSONE (DELTASONE) 5 MG tablet     rosuvastatin (CRESTOR) 10 MG tablet     sulfamethoxazole-trimethoprim (BACTRIM) 400-80 MG tablet     tacrolimus (GENERIC EQUIVALENT) 0.5 MG capsule     tacrolimus (GENERIC EQUIVALENT) 1 MG capsule     traZODone (DESYREL) 50 MG tablet     warfarin ANTICOAGULANT (COUMADIN) 1 MG tablet      warfarin ANTICOAGULANT (COUMADIN) 1 MG tablet     No current facility-administered medications for this visit.            Allergies:     No Known Allergies         Past Medical and Past Surgical History:     Past Medical History:   Diagnosis Date     BRENNNA (acute kidney injury) (H) 10/17/2021     Anxiety      Depression      HLD (hyperlipidemia)      HTN (hypertension)      Hypothyroidism      ILD (interstitial lung disease) (H)      SALTY on CPAP      Oxygen dependent     BL 4L since ~6/2021     Rheumatoid arthritis (H)     signs ~5/2020, dx 5/2021     S/P lung transplant (H) 10/16/2021     Shock liver 10/17/2021     Steroid-induced hyperglycemia      Traction bronchiectasis (H)        Past Surgical History:   Procedure Laterality Date     COLONOSCOPY W/ BIOPSIES AND POLYPECTOMY  07/21/2020     CV CORONARY ANGIOGRAM N/A 09/08/2021    Procedure: Coronary Angiogram with possible intervention;  Surgeon: Jovon Bullock MD;  Location:  HEART CARDIAC CATH LAB     CV RIGHT HEART CATH MEASUREMENTS RECORDED N/A 09/08/2021    Procedure: Right Heart Cath;  Surgeon: Jovon Bullock MD;  Location:  HEART CARDIAC CATH LAB     ESOPHAGOSCOPY, GASTROSCOPY, DUODENOSCOPY (EGD), COMBINED N/A 10/23/2021    Procedure: ESOPHAGOGASTRODUODENOSCOPY (EGD);  Surgeon: Miquel Pisano MD;  Location:  GI     ESOPHAGOSCOPY, GASTROSCOPY, DUODENOSCOPY (EGD), COMBINED N/A 11/02/2021    Procedure: ESOPHAGOGASTRODUODENOSCOPY (EGD);  Surgeon: Daniel Ortiz MD;  Location:  GI     ESOPHAGOSCOPY, GASTROSCOPY, DUODENOSCOPY (EGD), COMBINED N/A 11/5/2021    Procedure: ESOPHAGOGASTRODUODENOSCOPY (EGD);  Surgeon: Ronnell Hernandez MD;  Location:  GI     EXTRACTION(S) DENTAL N/A 09/22/2021    Procedure: EXTRACTION tooth #19;  Surgeon: Deepak Tobin DDS;  Location:  OR     HERNIA REPAIR       IR CHEST TUBE PLACEMENT NON-TUNNELLED LEFT  11/03/2021     PICC TRIPLE LUMEN PLACEMENT Left 11/04/2021    5FR TL PICC.  "Right non occlusive thrombus subclavian vein.     REPLACE GASTROJEJUNOSTOMY TUBE, PERCUTANEOUS N/A 11/9/2021    Procedure: REPLACEMENT, GASTROJEJUNOSTOMY TUBE, PERCUTANEOUS;  Surgeon: Hernando Rodriguez MD;  Location: [facility] GI     right acl       TRACHEOSTOMY PERCUTANEOUS N/A 10/29/2021    Procedure: Percutaneous Tracheostomy,;  Surgeon: Celine Jenkins MD;  Location: [facility] OR     TRANSPLANT LUNG RECIPIENT SINGLE X2 Bilateral 10/16/2021    Procedure: TRANSPLANT, LUNG, RECIPIENT, BILATERAL, Bronchoscopy, on-pump perfusion, bilateral clamshell sternotomy;  Surgeon: Yanick Corral MD;  Location: [facility] OR     XR ACROMIOCLAVICULAR JOINT BILATERAL               Social History:     Social History     Socioeconomic History     Marital status: Single     Spouse name: Not on file     Number of children: Not on file     Years of education: Not on file     Highest education level: Not on file   Occupational History     Not on file   Tobacco Use     Smoking status: Never Smoker     Smokeless tobacco: Never Used   Substance and Sexual Activity     Alcohol use: Never     Drug use: Never     Sexual activity: Not on file   Other Topics Concern     Parent/sibling w/ CABG, MI or angioplasty before 65F 55M? Not Asked   Social History Narrative     Not on file     Social Determinants of Health     Financial Resource Strain: Not on file   Food Insecurity: Not on file   Transportation Needs: Not on file   Physical Activity: Not on file   Stress: Not on file   Social Connections: Not on file   Intimate Partner Violence: Not on file   Housing Stability: Not on file     Social Updates:          Rejection and Infection History     Rejection Hx    DATE INDICATION  PATH BAL/MICRO TREATMENT                Infectious Hx           Exam:     /76   Pulse 73   Resp 17   Ht 1.646 m (5' 4.8\")   Wt 64.9 kg (143 lb)   SpO2 97%   BMI 23.94 kg/m    Body mass index is 23.94 kg/m .    GENERAL APPEARANCE: Well developed, well nourished, alert, " appears frail and deconditioned mildly disheveled  EYES: PERRL, EOMI  HENT: Nasal mucosa with no edema and no hyperemia. No nasal polyps.  EARS: Canals clear, TMs normal  MOUTH: Oral mucosa is moist, without any lesions, no tonsillar enlargement, no oropharyngeal exudate.  RESP: normal percussion, good air flow throughout.  No crackles. No rhonchi. No wheezes.  CV: Normal S1, S2, regular rhythm, tachycardic, no murmur.  No rub. No gallop. No LE edema.   ABDOMEN:  Bowel sounds normal, soft, nontender, no HSM or masses.   MS: extremities normal. + clubbing. No cyanosis.  SKIN: no rash on limited exam  NEURO: Significant baseline tremor  PSYCH: mentation appears normal. and affect normal to slightly flat         Data:     Results:  Recent Results (from the past 168 hour(s))   Basic metabolic panel    Collection Time: 01/05/22 10:30 AM   Result Value Ref Range    Sodium 140 133 - 144 mmol/L    Potassium 3.9 3.4 - 5.3 mmol/L    Chloride 104 94 - 109 mmol/L    Carbon Dioxide (CO2) 27 20 - 32 mmol/L    Anion Gap 9 3 - 14 mmol/L    Urea Nitrogen 27 7 - 30 mg/dL    Creatinine 1.45 (H) 0.66 - 1.25 mg/dL    Calcium 9.6 8.5 - 10.1 mg/dL    Glucose 86 70 - 99 mg/dL    GFR Estimate 57 (L) >60 mL/min/1.73m2   Magnesium    Collection Time: 01/05/22 10:30 AM   Result Value Ref Range    Magnesium 1.4 (L) 1.6 - 2.3 mg/dL   Phosphorus    Collection Time: 01/05/22 10:30 AM   Result Value Ref Range    Phosphorus 2.4 (L) 2.5 - 4.5 mg/dL   Tacrolimus by Tandem Mass Spectrometry    Collection Time: 01/05/22 10:30 AM   Result Value Ref Range    Tacrolimus by Tandem Mass Spectrometry 29.9 (HH) 5.0 - 15.0 ug/L    Tacrolimus Last Dose Date      Tacrolimus Last Dose Time     INR    Collection Time: 01/05/22 10:30 AM   Result Value Ref Range    INR 4.85 (H) 0.85 - 1.15   CBC with platelets and differential    Collection Time: 01/05/22 10:30 AM   Result Value Ref Range    WBC Count 11.4 (H) 4.0 - 11.0 10e3/uL    RBC Count 4.58 4.40 - 5.90 10e6/uL     Hemoglobin 12.4 (L) 13.3 - 17.7 g/dL    Hematocrit 38.6 (L) 40.0 - 53.0 %    MCV 84 78 - 100 fL    MCH 27.1 26.5 - 33.0 pg    MCHC 32.1 31.5 - 36.5 g/dL    RDW 14.3 10.0 - 15.0 %    Platelet Count 197 150 - 450 10e3/uL    % Neutrophils 82 %    % Lymphocytes 8 %    % Monocytes 8 %    % Eosinophils 0 %    % Basophils 0 %    % Immature Granulocytes 2 %    NRBCs per 100 WBC 0 <1 /100    Absolute Neutrophils 9.2 (H) 1.6 - 8.3 10e3/uL    Absolute Lymphocytes 0.9 0.8 - 5.3 10e3/uL    Absolute Monocytes 0.9 0.0 - 1.3 10e3/uL    Absolute Eosinophils 0.0 0.0 - 0.7 10e3/uL    Absolute Basophils 0.1 0.0 - 0.2 10e3/uL    Absolute Immature Granulocytes 0.2 <=0.4 10e3/uL    Absolute NRBCs 0.0 10e3/uL   CMV Quantitative, PCR (Blood)    Collection Time: 01/07/22  8:36 AM    Specimen: Hand, Right; Blood   Result Value Ref Range    CMV DNA IU/mL Not Detected Not Detected IU/mL   Basic metabolic panel    Collection Time: 01/07/22  8:36 AM   Result Value Ref Range    Sodium 142 133 - 144 mmol/L    Potassium 3.8 3.4 - 5.3 mmol/L    Chloride 104 94 - 109 mmol/L    Carbon Dioxide (CO2) 25 20 - 32 mmol/L    Anion Gap 13 3 - 14 mmol/L    Urea Nitrogen 30 7 - 30 mg/dL    Creatinine 1.67 (H) 0.66 - 1.25 mg/dL    Calcium 9.9 8.5 - 10.1 mg/dL    Glucose 84 70 - 99 mg/dL    GFR Estimate 48 (L) >60 mL/min/1.73m2   Magnesium    Collection Time: 01/07/22  8:36 AM   Result Value Ref Range    Magnesium 1.6 1.6 - 2.3 mg/dL   CBC with platelets    Collection Time: 01/07/22  8:36 AM   Result Value Ref Range    WBC Count 13.5 (H) 4.0 - 11.0 10e3/uL    RBC Count 4.82 4.40 - 5.90 10e6/uL    Hemoglobin 12.8 (L) 13.3 - 17.7 g/dL    Hematocrit 39.4 (L) 40.0 - 53.0 %    MCV 82 78 - 100 fL    MCH 26.6 26.5 - 33.0 pg    MCHC 32.5 31.5 - 36.5 g/dL    RDW 14.4 10.0 - 15.0 %    Platelet Count 185 150 - 450 10e3/uL   CMV DNA quantification    Collection Time: 01/07/22  8:36 AM    Specimen: Hand, Right; Blood   Result Value Ref Range    CMV DNA IU/mL Not Detected  Not Detected IU/mL   INR    Collection Time: 01/07/22  8:36 AM   Result Value Ref Range    INR 3.82 (H) 0.85 - 1.15   Tacrolimus level    Collection Time: 01/07/22  8:36 AM   Result Value Ref Range    Tacrolimus by Tandem Mass Spectrometry 12.9 5.0 - 15.0 ug/L    Tacrolimus Last Dose Date 1/5/2022     Tacrolimus Last Dose Time  8:00 PM    Basic metabolic panel    Collection Time: 01/10/22  9:19 AM   Result Value Ref Range    Sodium 142 133 - 144 mmol/L    Potassium 3.7 3.4 - 5.3 mmol/L    Chloride 106 94 - 109 mmol/L    Carbon Dioxide (CO2) 24 20 - 32 mmol/L    Anion Gap 12 3 - 14 mmol/L    Urea Nitrogen 23 7 - 30 mg/dL    Creatinine 1.19 0.66 - 1.25 mg/dL    Calcium 9.4 8.5 - 10.1 mg/dL    Glucose 94 70 - 99 mg/dL    GFR Estimate 72 >60 mL/min/1.73m2   Magnesium    Collection Time: 01/10/22  9:19 AM   Result Value Ref Range    Magnesium 1.5 (L) 1.6 - 2.3 mg/dL   CBC with platelets    Collection Time: 01/10/22  9:19 AM   Result Value Ref Range    WBC Count 10.7 4.0 - 11.0 10e3/uL    RBC Count 4.86 4.40 - 5.90 10e6/uL    Hemoglobin 12.9 (L) 13.3 - 17.7 g/dL    Hematocrit 40.6 40.0 - 53.0 %    MCV 84 78 - 100 fL    MCH 26.5 26.5 - 33.0 pg    MCHC 31.8 31.5 - 36.5 g/dL    RDW 14.6 10.0 - 15.0 %    Platelet Count 168 150 - 450 10e3/uL   INR    Collection Time: 01/10/22  9:19 AM   Result Value Ref Range    INR 2.25 (H) 0.85 - 1.15   General PFT Lab (Please always keep checked)    Collection Time: 01/10/22  9:33 AM   Result Value Ref Range    FVC-Pred 3.98 L    FVC-Pre 2.03 L    FVC-%Pred-Pre 51 %    FEV1-Pre 1.24 L    FEV1-%Pred-Pre 39 %    FEV1FVC-Pred 79 %    FEV1FVC-Pre 61 %    FEFMax-Pred 8.37 L/sec    FEFMax-Pre 4.35 L/sec    FEFMax-%Pred-Pre 52 %    FEF2575-Pred 2.83 L/sec    FEF2575-Pre 0.61 L/sec    ZID1400-%Pred-Pre 21 %    ExpTime-Pre 7.69 sec    FIFMax-Pre 4.27 L/sec    FEV1FEV6-Pred 80 %    FEV1FEV6-Pre 63 %                       Date Place TLC (%) FVC (%) FEV1 (%) FEV1/FVC DLCO (%) Note   1/3/22 [facility]   2.11  52 1.27 40 60   First post txp    1/10/22 [facility]   2.03 51 1.24 39 61                                      Results as noted above.    Chest x-ray personally reviewed on January 10, 2022, noted bilateral small pleural effusions and small area of cavitation in the left lower chest space does not appear vastly different from last chest x-ray 4 days prior         Assessment and Plan:     Transplants:    Edson Thornton is a 56 year old male who underwent BSLT transplant on 10/16/2021 (Lung) for NSIP/ILD with bronchiectasis,  He has a PMH of hypogammglobulinemia, and steroid induced DM. His post transplant course is complicated by early encephalopathy with CVA, afib RVR, GIB, and candidemia/candida empyema, with cardiac arrest on 11/2 from acute GIB. He was subsequently resuscitated and ultimately discharged to ARU 12/13- 12/30.     PULMONARY    Transplant:       Allograft Function: Post op course very complicated with persistent b/l pleural effusions and candida empyema leading to left trapped lung and LLL cavitary lesion. Initial post txp PFTs are lower than expected.  Suspect at this point that he is also severely deconditioned.  Chest x-ray reviewed today has small bilateral pleural effusions we will need to monitor these for potential drainage if increased.  Left lower lobe cavitation does not appear grossly changed follow-up with transplant ID as noted below.  No PFTs today.  Suspect dyspnea is related to deconditioning.  -Switched from Pulm rehab to physical therapy per recommendations of Pulm rehab and in discussion with patient    Immunosuppression:  Induction therapy: Basilixmab and high dose steroids  Tac goal 8-12  MMF 1000 bid (avoid AZA given TPMT)   Prednisone 10/7.5    Date AM dose (mg) PM dose (mg)   1/2/22 10 7.5   1/30/22 7.5 7.5   2/27/22 7.5 5   3/27/22 5 5   4/24/22 5 2.5         Prophylaxis:   PJP: Bactrim  CMV: D -/R-: CMV monthly check  EBV: D+ /R+: EBV q 3 months   Fungal: Nystatin    Positive  X-Match: Positive DSA, received 2 doses of ritux in June which is likely contributing to x-match result. 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   1/10/2022: Will continue to monitor every two weeks for now.    Concern for Strongyloides exposure: Pre transplant s/p ivermectin x 1 dose, donor and recip cultures NGTD.     LLL Cavitation: Klebsiella pneumoniae, Pseudomonas fluorescens/putida: Txp ID following has been on multiple rounds of antibiotics and currently on levofloxacin until 1/18/22 with follow up with Txp ID for same date with repeat chest CT at that time.     Disseminated Candida: Candidemia 10/20/21 and resp cultures along with candida empyema. Remains on IV fluconazole until 1/18/22, txp ID follow up.   1/10/2022: Cr is better but GFR is ~70. Will not reduce dose for now to avoid under treatment and especially as we will stopping it in 8days.    Hypogammaglobulinemia: IgG required repletion several times and despite requiring was unable to receive at ARU.   1/10/2022: Last IGG was 502 on 1/3, will monitor and not replace for now.    BRENNAN: Baseline Cr of 1, progressive increase over several weeks in ARU likely prerenal. Stopped lasix on 1/3. IVF given since last visit.  1/10/2022: Cr is better but not back to baseline. He is drinking 60 -70oz/day. Encouraged to continue to do so. Tac level pending.    Nausea/Vomiting: Has been having nausea and vomiting with eating, zofran is helping with symptoms.  And has not had any emesis since prior to last visit    Hyperglycemia: Steroid induced, on 5U lantus.   1/10/2022: Will decrease lantus to 3U/day. If BS are good at next visit consider stopping lantus.    SALTY: Pretxp was supposed to be on home CPAP.     HTN: BP previously elevated in clinic 128/91  - Monitor  - Cont amlodipine    Depression/anxiety:   - Escitalopram    Multifocal Ischemic CVA: Etio embolic  vs. Perioperative initially with symptoms b/l UE paresis R>L, thought 2/2 new onset afib.   - SBP goal <140  - Continue warfarin and rosuvastatin    AFib with RVR:  Metop and amio d/c'd due to intermittent bradycardia.   - Zio patch with EP follow up - pending.  - Warfarin      Rheumatoid Arthritis:  Previously treated with ritux.   - Will eventually require follow up     Malnutrition: PEG/J removed 12/24 with improved PO intake, appetite   - MV  - Folate, B6, B12 supplements    GIB: Severe progressive hypotension with resultant sally/asystole 2/2 GIB in setting of anticoagulation requiring massive transfusion protocol in October during hospitalization and found to be secondary to ulceration near PEG bumper site.   - PPI BID, durn to be determined.    Hypothyroidism: TSH 3.17 on 11/19  - Levothyroxine 25 mcg daily    Health Literacy: Reviewed medications with patient and wife today several times over and reclarified which medications were anti-infective in which medications were antirejection.  There was some significant confusion and difficulty with editing meds and doses.  Has follow-up with pharmacy this coming week to clarify and reteach.      Maintenance:  Derm Exam: Due Annually  Colon Ca Screening:  DEXA:   Imms:   [id number]:  unknown; Prevnar 13: unknown  TDaP:  2018  Shingrix: 2018, 2019  Jerry administered 1/7/22      CHANGES TODAY  - Lantus to 3U  - Flu vaccine at next visit.  - C19 vaccine in three weeks.    I personally spent 40 minutes in documentation, the interview and exam, and review of the chart/labs/imaging on 1/10/22

## 2022-01-11 ENCOUNTER — OFFICE VISIT (OUTPATIENT)
Dept: PHARMACY | Facility: CLINIC | Age: 57
End: 2022-01-11
Payer: COMMERCIAL

## 2022-01-11 ENCOUNTER — HOME INFUSION (PRE-WILLOW HOME INFUSION) (OUTPATIENT)
Dept: PHARMACY | Facility: CLINIC | Age: 57
End: 2022-01-11
Payer: COMMERCIAL

## 2022-01-11 ENCOUNTER — TELEPHONE (OUTPATIENT)
Dept: TRANSPLANT | Facility: CLINIC | Age: 57
End: 2022-01-11
Payer: COMMERCIAL

## 2022-01-11 DIAGNOSIS — Z94.2 S/P LUNG TRANSPLANT (H): Primary | ICD-10-CM

## 2022-01-11 DIAGNOSIS — I48.91 ATRIAL FIBRILLATION, UNSPECIFIED TYPE (H): ICD-10-CM

## 2022-01-11 DIAGNOSIS — R73.9 HYPERGLYCEMIA: ICD-10-CM

## 2022-01-11 DIAGNOSIS — R11.0 NAUSEA: ICD-10-CM

## 2022-01-11 LAB
DONOR IDENTIFICATION: NORMAL
DQB5: 4032
DSA COMMENTS: NORMAL
DSA PRESENT: YES
DSA TEST METHOD: NORMAL
ORGAN: NORMAL
SA 1 CELL: NORMAL
SA 1 TEST METHOD: NORMAL
SA 2 CELL: NORMAL
SA 2 TEST METHOD: NORMAL
SA1 HI RISK ABY: NORMAL
SA1 MOD RISK ABY: NORMAL
SA2 HI RISK ABY: NORMAL
SA2 MOD RISK ABY: NORMAL
UNACCEPTABLE ANTIGENS: NORMAL
UNOS CPRA: 26
ZZZSA 1  COMMENTS: NORMAL
ZZZSA 2 COMMENTS: NORMAL

## 2022-01-11 PROCEDURE — 99607 MTMS BY PHARM ADDL 15 MIN: CPT | Performed by: PHARMACIST

## 2022-01-11 PROCEDURE — 99606 MTMS BY PHARM EST 15 MIN: CPT | Performed by: PHARMACIST

## 2022-01-11 NOTE — PROGRESS NOTES
Medication Therapy Management (MTM) Encounter    ASSESSMENT:                            Medication Adherence/Access: reviewed and updated medication card. Patient will be taking medications at 8, 12, 5, 8. He will take his txp meds 12 hours apart. Discussed numbering card and bottles to make things more simple.     Lung Transplant:  Stable    Nausea: Patient's nausea is resolved while scheduling Ondansetron 4mg QID, recommend he start using it PRN as prescribed.     Hyperglycemia due to steroid use:  BG well controlled. No changes today.     Afib: Pt is taking Warfarin incorrectly (twice daily dosing). Per INR clinic note from yesterday he should take 0.5mg today (which he already did this morning) and 1mg every other day unless told otherwise.     PLAN:                            1. Warfarin dose per INR clinic: today, 0.5mg once (you already took this), tomorrow 1mg unless told otherwise by INR clinic.   2. Try taking Ondansetron as needed, you can take up to 4 x daily.   3. Consider numbering your medication card and your medication bottles to make your set up easier. Always double check the name of the medication before putting it in the box.     Follow-up: 3/30      SUBJECTIVE/OBJECTIVE:                          Edson Thornton is a 56 year old male coming in for a follow-up visit. He was referred to me from txp team. Patient was accompanied by Peg. Today's visit is a follow-up MTM visit from 1/6     Reason for visit: Patient did not bring pill box or medications today, but we did update his med card.     Allergies/ADRs: Reviewed in chart  Past Medical History: Reviewed in chart  Tobacco: He reports that he has never smoked. He has never used smokeless tobacco.  Alcohol: not currently using    Medication Adherence/Access: Patient's medication card still has txp emdications at 8am and 4-6 pm despite last week's visit.     Lung Transplant:  Current immunosuppressants include Mycophenolate Mofetil 1000mg twice  daily,TAC 2mg every morning and 1mg every evening, prednisone 10mg (2 tabs) every morning and 7.5mg (1.5 tabs) every evening.  Pt reports no issues.   Transplant date: 10/16/21  Estimated Creatinine Clearance: 63.6 mL/min (based on SCr of 1.19 mg/dL).  CMV prophylaxis:  Donor (-), Recipient (-), no prophylaxis  PCP prophylaxis: Bactrim S S daily  Thrush prophylaxis:Nystatin 6 months post tx  PPI use: Pantoprazole 40mg twice daily.   Supplements: Mag Oxide 800mg twice daily ( 2 hours from MMF) .  Tx Coordinator: Mady Marin, Using Med Card: Yes  Recent Infections:  Disseminated candida continuing IV fluconazole until 1/18/22.   Immunizations: annual flu shot unknown; [id number]:  unknown; Prevnar 13: unknown; TDaP:  2018; Shingrix: 2018, 2019, HBV: unknown, COVID: unknown    Nausea: Pt is taking Ondansetron 4mg ODT QID scheduled. Has not had nausea since taking it at this dose.     Hyperglycemia due to steroid use:  Currently taking Lantus 3 units daily. Patient is not experiencing side effects.  Ranges (patient reported):   Highest was 234, it was only time over 200. He was sitting on the couch eating candy.   Symptoms of low blood sugar? None, one reading of 78 since discharge.  Symptoms of high blood sugar? none  Lab Results   Component Value Date    A1C 5.3 11/15/2021    A1C 6.6 09/07/2021    A1C 6.5 09/05/2021     Afib: Patient is currently taking warfarin 0.5mg every morning and 1mg every evening for anticoagulation. Patient reports no current concerns of bruising or bleeding.     Today's Vitals: There were no vitals taken for this visit.  ----------------    I spent 45 minutes with this patient today. I offer these suggestions for consideration by txp team. A copy of the visit note was provided to the patient's referring provider.    The patient was given a summary of these recommendations.     Michael Weldon, PharmD  [facility] Pharmacist    Phone: [phone]      Medication Therapy  Recommendations  Atrial fibrillation, unspecified type (H)    Current Medication: warfarin ANTICOAGULANT (COUMADIN) 1 MG tablet   Rationale: Does not understand instructions - Adherence - Adherence   Recommendation: Provide Adherence Intervention   Status: Patient Agreed - Adherence/Education         Nausea    Current Medication: ondansetron (ZOFRAN-ODT) 4 MG ODT tab   Rationale: Dose too high - Dosage too high - Safety   Recommendation: Decrease Frequency   Status: Patient Agreed - Adherence/Education

## 2022-01-11 NOTE — TELEPHONE ENCOUNTER
Tacrolimus level 12.7 at 13 hours, on 1/10/2022.  Goal 8-12.   Current dose 1.5 mg in AM, 1 mg in PM    Patient confirmed dose decreased last Friday. Given creatinine improved, will recheck level tomorrow with home care and if level still elevated, will decreased dose.     Discussed with patient   [employer] message sent

## 2022-01-11 NOTE — PATIENT INSTRUCTIONS
Recommendations from today's MTM visit:                                                       1. Warfarin dose per INR clinic: today, 0.5mg once (you already took this), tomorrow 1mg unless told otherwise by INR clinic.   2. Try taking Ondansetron as needed, you can take up to 4 x daily.   3. Consider numbering your medication card and your medication bottles to make your set up easier. Always double check the name of the medication before putting it in the box.     Follow-up: 33/30 bat 9:30am, phone call    It was great to speak with you today.  I value your experience and would be very thankful for your time with providing feedback on our clinic survey. You may receive a survey via email or text message in the next few days.     To schedule another MTM appointment, please call the clinic directly or you may call the MTM scheduling line at [phone] or toll-free at [phone].     My Clinical Pharmacist's contact information:                                                      Please feel free to contact me with any questions or concerns you have.      Michael Weldon, PharmD  [facility] Pharmacist    Phone: [phone]

## 2022-01-12 ENCOUNTER — ANTICOAGULATION THERAPY VISIT (OUTPATIENT)
Dept: ANTICOAGULATION | Facility: CLINIC | Age: 57
End: 2022-01-12

## 2022-01-12 ENCOUNTER — LAB (OUTPATIENT)
Dept: LAB | Facility: CLINIC | Age: 57
End: 2022-01-12
Payer: COMMERCIAL

## 2022-01-12 DIAGNOSIS — I48.91 ATRIAL FIBRILLATION, UNSPECIFIED TYPE (H): Primary | ICD-10-CM

## 2022-01-12 DIAGNOSIS — I48.91 ATRIAL FIBRILLATION, UNSPECIFIED TYPE (H): ICD-10-CM

## 2022-01-12 LAB
INR BLD: 2.4 (ref 0.9–1.1)
INR PPP: 2.18 (ref 0.85–1.15)

## 2022-01-12 NOTE — PROGRESS NOTES
present for registration, RN assessment, consult with Dr. aDnie Roe and Dr. Jennifer Yousif; as well as OR nurses. Rounded on patient with staff, interpreting services were offered and contact information was provided.      Constantin Chan CHI//   Patient Relations & Interpreting Services  p: [phone] / [email or web address] [email or web address] MANAGEMENT     Edson Thornton 56 year old male is on warfarin with therapeutic INR result. (Goal INR 2.0-3.0)    Recent labs: (last 7 days)     01/12/22  1009   INR 2.4*       ASSESSMENT     Source(s): Chart Review and Patient/Caregiver Call       Warfarin doses taken: See MTM visit on 1/11/22. Pt took 1.5 mg on 1/10/22,0.5 mg on 1/11/22    Diet: No new diet changes identified    New illness, injury, or hospitalization: Yes: Lung transplant 10/16/21    Medication/supplement changes: diflucan infusion daily 12/31-1/18/22    Signs or symptoms of bleeding or clotting: No    Previous INR: Therapeutic last visit; previously outside of goal range    Additional findings: None     PLAN     Recommended plan for ongoing change(s) affecting INR     Dosing Instructions: Continue your current warfarin dose with next INR in 2 days       Summary  As of 1/12/2022    Full warfarin instructions:  1 mg every day   Next INR check:  1/14/2022             Telephone call with Edson who verbalizes understanding and agrees to plan    Lab visit scheduled    Education provided: Importance of notifying clinic for changes in medications; a sooner lab recheck maybe needed. and Contact [phone] with any changes, questions or concerns.     Plan made with [facility] Pharmacist Antonella Ashley, RN  Anticoagulation Clinic  1/12/2022    _______________________________________________________________________     Anticoagulation Episode Summary     Current INR goal:  2.0-3.0   TTR:  78.7 % (4 d)   Target end date:  Indefinite   Send INR reminders to:  [facility]    Indications    Atrial fibrillation  unspecified type (H) [I48.91]           Comments:  [facility] P: [phone]  Transplant Labs twice a week  [facility] then back [facility] [place]         Anticoagulation Care Providers     Provider Role Specialty Phone number    Abiodun Woods MD Referring Pulmonary Disease  [phone]

## 2022-01-13 ENCOUNTER — TELEPHONE (OUTPATIENT)
Dept: TRANSPLANT | Facility: CLINIC | Age: 57
End: 2022-01-13

## 2022-01-13 ENCOUNTER — HOME INFUSION (PRE-WILLOW HOME INFUSION) (OUTPATIENT)
Dept: PHARMACY | Facility: CLINIC | Age: 57
End: 2022-01-13
Payer: COMMERCIAL

## 2022-01-13 ENCOUNTER — TELEPHONE (OUTPATIENT)
Dept: TRANSPLANT | Facility: CLINIC | Age: 57
End: 2022-01-13
Payer: COMMERCIAL

## 2022-01-13 ENCOUNTER — DOCUMENTATION ONLY (OUTPATIENT)
Dept: ANTICOAGULATION | Facility: CLINIC | Age: 57
End: 2022-01-13
Payer: COMMERCIAL

## 2022-01-13 DIAGNOSIS — G47.01 INSOMNIA DUE TO MEDICAL CONDITION: ICD-10-CM

## 2022-01-13 DIAGNOSIS — Z94.2 S/P LUNG TRANSPLANT (H): Chronic | ICD-10-CM

## 2022-01-13 DIAGNOSIS — I10 BENIGN ESSENTIAL HYPERTENSION: ICD-10-CM

## 2022-01-13 PROCEDURE — 80197 ASSAY OF TACROLIMUS: CPT | Performed by: INTERNAL MEDICINE

## 2022-01-13 RX ORDER — ROSUVASTATIN CALCIUM 10 MG/1
10 TABLET, COATED ORAL DAILY
Qty: 30 TABLET | Refills: 11 | Status: SHIPPED | OUTPATIENT
Start: 2022-01-13 | End: 2022-03-24

## 2022-01-13 RX ORDER — FLUTICASONE PROPIONATE 50 MCG
1 SPRAY, SUSPENSION (ML) NASAL DAILY
Qty: 16 G | Refills: 11 | Status: SHIPPED | OUTPATIENT
Start: 2022-01-13 | End: 2022-03-24

## 2022-01-13 RX ORDER — PHENOL 1.4 %
10 AEROSOL, SPRAY (ML) MUCOUS MEMBRANE
Qty: 30 TABLET | Refills: 3 | Status: SHIPPED | OUTPATIENT
Start: 2022-01-13 | End: 2022-03-24

## 2022-01-13 RX ORDER — NYSTATIN 100000/ML
1000000 SUSPENSION, ORAL (FINAL DOSE FORM) ORAL 4 TIMES DAILY
Qty: 1500 ML | Refills: 4 | Status: SHIPPED | OUTPATIENT
Start: 2022-01-13 | End: 2022-03-24

## 2022-01-13 RX ORDER — ESCITALOPRAM OXALATE 20 MG/1
20 TABLET ORAL DAILY
Qty: 30 TABLET | Refills: 11 | Status: SHIPPED | OUTPATIENT
Start: 2022-01-13 | End: 2022-03-24

## 2022-01-13 RX ORDER — MULTIPLE VITAMINS W/ MINERALS TAB 9MG-400MCG
1 TAB ORAL DAILY
Qty: 30 TABLET | Refills: 11 | Status: SHIPPED | OUTPATIENT
Start: 2022-01-13 | End: 2022-03-21

## 2022-01-13 RX ORDER — MYCOPHENOLATE MOFETIL 250 MG/1
1250 CAPSULE ORAL 2 TIMES DAILY
Qty: 300 CAPSULE | Refills: 11 | Status: SHIPPED | OUTPATIENT
Start: 2022-01-13 | End: 2022-03-21

## 2022-01-13 RX ORDER — AMLODIPINE BESYLATE 5 MG/1
5 TABLET ORAL DAILY
Qty: 30 TABLET | Refills: 11 | Status: SHIPPED | OUTPATIENT
Start: 2022-01-13 | End: 2022-03-11

## 2022-01-13 RX ORDER — LEVOTHYROXINE SODIUM 25 UG/1
25 TABLET ORAL DAILY
Qty: 30 TABLET | Refills: 11 | Status: SHIPPED | OUTPATIENT
Start: 2022-01-13 | End: 2022-03-24

## 2022-01-13 RX ORDER — MAGNESIUM OXIDE 400 MG/1
800 TABLET ORAL 2 TIMES DAILY
Qty: 120 TABLET | Refills: 11 | Status: SHIPPED | OUTPATIENT
Start: 2022-01-13 | End: 2022-01-25

## 2022-01-13 RX ORDER — PREDNISONE 5 MG/1
TABLET ORAL
Qty: 150 TABLET | Refills: 3 | Status: SHIPPED | OUTPATIENT
Start: 2022-01-13 | End: 2022-02-28

## 2022-01-13 RX ORDER — SULFAMETHOXAZOLE AND TRIMETHOPRIM 400; 80 MG/1; MG/1
1 TABLET ORAL DAILY
Qty: 30 TABLET | Refills: 11 | Status: SHIPPED | OUTPATIENT
Start: 2022-01-13 | End: 2022-03-24

## 2022-01-13 NOTE — PROGRESS NOTES
ANTICOAGULATION  MANAGEMENT     Interacting Medication Review    Interacting medication(s): Sulfamethoxazole-Trimethoprim (Bactrim) with warfarin.    Duration: Long-term    Indication: Prophylaxis    New medication?: No, long term medication. No affect on INR anticipated at this time.       PLAN     Continue current warfarin dose. Recommend to check INR on 1/14/22    Patient was not contacted    No adjustment to Anticoagulation Calendar was required    Mesha Chau RN

## 2022-01-13 NOTE — TELEPHONE ENCOUNTER
DSA positive with DCB5 increasing over last several weeks and 4032 on 1-10-22.  Per Dr. Woods will increase mycophenolate dosing to 1250mg BID. Called patient and reviewed results and medication change and he will implement. Will plan to repeat DSA testing in one week. Will have support staff send out PRA kit to patient.

## 2022-01-13 NOTE — TELEPHONE ENCOUNTER
Patient DSA continues to be elevated.   Dr. Christopher discussed with providers:  -  Continue to monitor  - increase MMF to 1250mg BID.

## 2022-01-14 ENCOUNTER — ANTICOAGULATION THERAPY VISIT (OUTPATIENT)
Dept: ANTICOAGULATION | Facility: CLINIC | Age: 57
End: 2022-01-14

## 2022-01-14 ENCOUNTER — HOME INFUSION (PRE-WILLOW HOME INFUSION) (OUTPATIENT)
Dept: PHARMACY | Facility: CLINIC | Age: 57
End: 2022-01-14
Payer: COMMERCIAL

## 2022-01-14 ENCOUNTER — TELEPHONE (OUTPATIENT)
Dept: TRANSPLANT | Facility: CLINIC | Age: 57
End: 2022-01-14

## 2022-01-14 ENCOUNTER — LAB (OUTPATIENT)
Dept: LAB | Facility: CLINIC | Age: 57
End: 2022-01-14
Payer: COMMERCIAL

## 2022-01-14 DIAGNOSIS — Z94.2 S/P LUNG TRANSPLANT (H): Chronic | ICD-10-CM

## 2022-01-14 DIAGNOSIS — I48.91 ATRIAL FIBRILLATION, UNSPECIFIED TYPE (H): Primary | ICD-10-CM

## 2022-01-14 DIAGNOSIS — I48.91 ATRIAL FIBRILLATION, UNSPECIFIED TYPE (H): ICD-10-CM

## 2022-01-14 LAB — INR BLD: 2.5 (ref 0.9–1.1)

## 2022-01-14 RX ORDER — TACROLIMUS 1 MG/1
1 CAPSULE ORAL 2 TIMES DAILY
Qty: 60 CAPSULE | Refills: 11 | Status: SHIPPED | OUTPATIENT
Start: 2022-01-14 | End: 2022-01-19

## 2022-01-14 NOTE — TELEPHONE ENCOUNTER
Tacrolimus level 23.3 at 16 hours, on 1/13/2022.  Goal 8-12.   Current dose 1.5 mg in AM, 1 mg in PM    Dose changed to 1 mg in AM, 1 mg in PM   Recheck level in 3-5    Discussed with patient   MyChart message sent

## 2022-01-14 NOTE — PROGRESS NOTES
ANTICOAGULATION MANAGEMENT     Edson Thornton 56 year old male is on warfarin with therapeutic INR result. (Goal INR 2.0-3.0)    Recent labs: (last 7 days)     01/14/22  0944   INR 2.5*       ASSESSMENT     Source(s): Patient/Caregiver Call       Warfarin doses taken: Warfarin taken as instructed    Diet: No new diet changes identified    New illness, injury, or hospitalization: No    Medication/supplement changes: diflucan infusion daily 12/31/21 - 1/18/22    Signs or symptoms of bleeding or clotting: No    Previous INR: Therapeutic last 2(+) visits    Additional findings: None     PLAN     Recommended plan for no diet, medication or health factor changes affecting INR     Dosing Instructions: Continue your current warfarin dose with next INR in 3 days       Summary  As of 1/14/2022    Full warfarin instructions:  1 mg every day   Next INR check:  1/17/2022             Telephone call with Edson who verbalizes understanding and agrees to plan    Lab visit scheduled--Bret has other labs already scheduled--added INR on to them    Education provided: Please call back if any changes to your diet, medications or how you've been taking warfarin and Contact [phone] with any changes, questions or concerns.     Plan made per [facility] anticoagulation protocol    Mesha Chau RN  Anticoagulation Clinic  1/14/2022    _______________________________________________________________________     Anticoagulation Episode Summary     Current INR goal:  2.0-3.0   TTR:  85.3 % (6 d)   Target end date:  Indefinite   Send INR reminders to:  [facility]    Indications    Atrial fibrillation  unspecified type (H) [I48.91]           Comments:  [facility] P: [phone]  Transplant Labs twice a week  Staying Local: [facility] then back to [place]         Anticoagulation Care Providers     Provider Role Specialty Phone number    Abiodun Woods MD Referring Pulmonary Disease [phone]

## 2022-01-17 ENCOUNTER — ANTICOAGULATION THERAPY VISIT (OUTPATIENT)
Dept: ANTICOAGULATION | Facility: CLINIC | Age: 57
End: 2022-01-17

## 2022-01-17 ENCOUNTER — LAB (OUTPATIENT)
Dept: LAB | Facility: CLINIC | Age: 57
End: 2022-01-17
Payer: COMMERCIAL

## 2022-01-17 ENCOUNTER — HOME INFUSION (PRE-WILLOW HOME INFUSION) (OUTPATIENT)
Dept: PHARMACY | Facility: CLINIC | Age: 57
End: 2022-01-17

## 2022-01-17 DIAGNOSIS — Z79.899 ENCOUNTER FOR LONG-TERM (CURRENT) USE OF HIGH-RISK MEDICATION: ICD-10-CM

## 2022-01-17 DIAGNOSIS — Z94.2 LUNG REPLACED BY TRANSPLANT (H): ICD-10-CM

## 2022-01-17 DIAGNOSIS — I48.91 ATRIAL FIBRILLATION, UNSPECIFIED TYPE (H): Primary | ICD-10-CM

## 2022-01-17 DIAGNOSIS — D84.9 IMMUNOSUPPRESSED STATUS (H): ICD-10-CM

## 2022-01-17 DIAGNOSIS — Z94.2 S/P LUNG TRANSPLANT (H): ICD-10-CM

## 2022-01-17 DIAGNOSIS — Z94.2 S/P LUNG TRANSPLANT (H): Primary | ICD-10-CM

## 2022-01-17 DIAGNOSIS — I48.91 ATRIAL FIBRILLATION, UNSPECIFIED TYPE (H): ICD-10-CM

## 2022-01-17 LAB
ANION GAP SERPL CALCULATED.3IONS-SCNC: 10 MMOL/L (ref 3–14)
BUN SERPL-MCNC: 16 MG/DL (ref 7–30)
CALCIUM SERPL-MCNC: 9.9 MG/DL (ref 8.5–10.1)
CHLORIDE BLD-SCNC: 107 MMOL/L (ref 94–109)
CO2 SERPL-SCNC: 26 MMOL/L (ref 20–32)
CREAT SERPL-MCNC: 1.05 MG/DL (ref 0.66–1.25)
ERYTHROCYTE [DISTWIDTH] IN BLOOD BY AUTOMATED COUNT: 15.1 % (ref 10–15)
GFR SERPL CREATININE-BSD FRML MDRD: 83 ML/MIN/1.73M2
GLUCOSE BLD-MCNC: 98 MG/DL (ref 70–99)
HCT VFR BLD AUTO: 40.8 % (ref 40–53)
HGB BLD-MCNC: 13 G/DL (ref 13.3–17.7)
INR BLD: 1.8 (ref 0.9–1.1)
MAGNESIUM SERPL-MCNC: 1.7 MG/DL (ref 1.6–2.3)
MCH RBC QN AUTO: 27 PG (ref 26.5–33)
MCHC RBC AUTO-ENTMCNC: 31.9 G/DL (ref 31.5–36.5)
MCV RBC AUTO: 85 FL (ref 78–100)
PHOSPHATE SERPL-MCNC: 2.4 MG/DL (ref 2.5–4.5)
PLATELET # BLD AUTO: 164 10E3/UL (ref 150–450)
POTASSIUM BLD-SCNC: 4.3 MMOL/L (ref 3.4–5.3)
RBC # BLD AUTO: 4.81 10E6/UL (ref 4.4–5.9)
SODIUM SERPL-SCNC: 143 MMOL/L (ref 133–144)
TACROLIMUS BLD-MCNC: 8.8 UG/L (ref 5–15)
TME LAST DOSE: NORMAL H
TME LAST DOSE: NORMAL H
WBC # BLD AUTO: 11.2 10E3/UL (ref 4–11)

## 2022-01-17 PROCEDURE — 99000 SPECIMEN HANDLING OFFICE-LAB: CPT | Performed by: PATHOLOGY

## 2022-01-17 PROCEDURE — 36415 COLL VENOUS BLD VENIPUNCTURE: CPT | Performed by: PATHOLOGY

## 2022-01-17 PROCEDURE — 87799 DETECT AGENT NOS DNA QUANT: CPT | Mod: 90 | Performed by: PATHOLOGY

## 2022-01-17 PROCEDURE — 80197 ASSAY OF TACROLIMUS: CPT | Mod: 90 | Performed by: PATHOLOGY

## 2022-01-17 PROCEDURE — 86832 HLA CLASS I HIGH DEFIN QUAL: CPT | Performed by: PATHOLOGY

## 2022-01-17 PROCEDURE — 80048 BASIC METABOLIC PNL TOTAL CA: CPT | Performed by: PATHOLOGY

## 2022-01-17 PROCEDURE — 85027 COMPLETE CBC AUTOMATED: CPT | Performed by: PATHOLOGY

## 2022-01-17 PROCEDURE — 86833 HLA CLASS II HIGH DEFIN QUAL: CPT | Performed by: PATHOLOGY

## 2022-01-17 PROCEDURE — 83735 ASSAY OF MAGNESIUM: CPT | Performed by: PATHOLOGY

## 2022-01-17 PROCEDURE — 85610 PROTHROMBIN TIME: CPT | Performed by: PATHOLOGY

## 2022-01-17 PROCEDURE — 84100 ASSAY OF PHOSPHORUS: CPT | Performed by: PATHOLOGY

## 2022-01-18 ENCOUNTER — VIRTUAL VISIT (OUTPATIENT)
Dept: INFECTIOUS DISEASES | Facility: CLINIC | Age: 57
End: 2022-01-18
Attending: INTERNAL MEDICINE
Payer: COMMERCIAL

## 2022-01-18 DIAGNOSIS — J86.9 PLEURAL EMPYEMA (H): ICD-10-CM

## 2022-01-18 DIAGNOSIS — J15.1 PNEUMONIA OF LEFT LUNG DUE TO PSEUDOMONAS SPECIES, UNSPECIFIED PART OF LUNG (H): ICD-10-CM

## 2022-01-18 DIAGNOSIS — J15.0: ICD-10-CM

## 2022-01-18 DIAGNOSIS — Z94.2 LUNG REPLACED BY TRANSPLANT (H): ICD-10-CM

## 2022-01-18 DIAGNOSIS — Z23 NEED FOR VACCINATION: ICD-10-CM

## 2022-01-18 DIAGNOSIS — J98.4 PNEUMONIA WITH CAVITY OF LUNG: Primary | ICD-10-CM

## 2022-01-18 DIAGNOSIS — J18.9 PNEUMONIA WITH CAVITY OF LUNG: Primary | ICD-10-CM

## 2022-01-18 DIAGNOSIS — Z00.6 EXAMINATION OF PARTICIPANT OR CONTROL IN CLINICAL RESEARCH: Primary | ICD-10-CM

## 2022-01-18 DIAGNOSIS — B37.7 CANDIDEMIA (H): ICD-10-CM

## 2022-01-18 LAB
CMV DNA SPEC NAA+PROBE-ACNC: NOT DETECTED IU/ML
CMV DNA SPEC NAA+PROBE-ACNC: NOT DETECTED IU/ML
DONOR IDENTIFICATION: NORMAL
DQB5: 1849
DSA COMMENTS: NORMAL
DSA PRESENT: YES
DSA TEST METHOD: NORMAL
ORGAN: NORMAL
SA 1 CELL: NORMAL
SA 1 TEST METHOD: NORMAL
SA 2 CELL: NORMAL
SA 2 TEST METHOD: NORMAL
SA1 HI RISK ABY: NORMAL
SA1 MOD RISK ABY: NORMAL
SA2 HI RISK ABY: NORMAL
SA2 MOD RISK ABY: NORMAL
UNACCEPTABLE ANTIGENS: NORMAL
UNOS CPRA: 26
ZZZSA 1  COMMENTS: NORMAL
ZZZSA 2 COMMENTS: NORMAL

## 2022-01-18 PROCEDURE — 99214 OFFICE O/P EST MOD 30 MIN: CPT | Mod: 95 | Performed by: INTERNAL MEDICINE

## 2022-01-18 NOTE — PROGRESS NOTES
[facility]  January 19, 2022         Assessment and Plan:   Edson Thornton is a 56 year old male with NSIP/ILD, bronchiectasis, moderate PH, RA, SALTY, chronic HSV infection, hypogammaglobulinemia, steroid-induced diabetes, hypothyroidism, PFO, HTN, HLD, duodenal anomaly, anxiety, and depression. Admitted on 9/5/21 from OSH for acute on chronic respiratory failure 2/2 ILD exacerbation now s/p BSLT on 10/16/21. Prolonged vent wean s/p trach and PEG/J tube.  Post-op course also complicated by encephalopathy and diffuse weakness, acute to subacute CVA, afib with RVR, BRENNAN, GI bleed, Candidemia/Candida empyema, and anxiety. Code called 11/2 for bradycardia/asystole, progressive hypotension, found to have significant GI bleed, EGD with adherent clot near PEG tube site that was clipped. Seen today for routine follow up.     1. S/p BSLT for ILD:  Bilateral pleural effusions: complicated by above. No new pulmonary complaints, continues to work with PT/OT at home. Dyspnea with exertion only, improving. Sating 96% on room air. CMV 1/17 negative. CXR reviewed today demonstrates stable effusions and LLL consolidation. PFTs are lower than expected, improved slightly from prior, likely secondary to significant weakness/deconditioning.   - CT chest today per below  - Continue MMF 1250 mg BID (AZA to be avoided given TPMT), tacrolimus (goal 8-12) and prednisone     Date AM dose (mg) PM dose (mg)   12/5/21 10 10   1/2/22 10 7.5   1/30/22 7.5 7.5   2/27/22 7.5 5   3/27/22 5 5   4/24/22 5 2.5      Prophylaxis:   - Bactrim for PJP ppx, monitor given BRENNAN  - Nystatin fX 6 months  - Check EBV every 3 months, last negative on 1/7      Donor Recipient Intervention   CMV status Negative Negative CMV monthly   EBV status Positive Positive EBV every 3 months   HSV status N/A Positive S/p acyclovir POD #1-30 (recent infection history pre-txp)      2. Positive cross match:   Positive  DSA: received two doses of rituximab in June, which is likely contributing to cross match result. DSA newly positive 11/29, most recently with DQB5 down to 1849 (from 4032).   - Monitor in two weeks     3. Klebsiella pneumoniae:  Pseudomonas fluorescens/putida:   LLL cavity: completed course of levaquin yesterday (see prior notes for course).  - CT chest today      4. Disseminated Candida: on blood cultures 10/20 and 10/22. Respiratory cultures with persistent Candida albicans. TC 10/23 without evidence of endocarditis.  Ophthalmology consult 10/24 with benign dilated fundoscopic exam.  Candida empyema also noted 10/25, chest tubes inadvertently removed by CVTS 10/28, left chest tube replaced by IR 11/3 as above with ongoing Candida on cultures (11/3, 11/18). Following with Transplant ID.  - Continue IV fluconazole   - CT chest today to determine treatment course     5. Recurrent HSV: chronic intermittent active infection pre-transplant with HSV infection s/p 10 day treatment course of ACV through 10/9. Now iwith new lesions just inferior to right lower lip and small lesions on jaw  - Restart valacyclovir today, reassess at visit next week for extended course  - Likely to need suppressive antiviral     6. Hypogammaglobulinemia: s/p IVIG 10/21, repeat IgG  on11/17 still low at 198, s/p IVIG on 11/18. Last IgG of 502 on 1/3.  - Recommend recheck in one month, 2/3     7. SALTY: noted pre-transplant. Supposed to be on home CPAP, but has not been using it.  - Resume CPAP, will need to f/u with Sleep Medicine at home with repeat sleep study    8. Hypomagnesemia: secondary to CNI. Mg 1.7 today.   - Continue Mg oxide 800 mg BID    9. HTN: BP well controlled today.   - Continue amlodipine    10. Depression/anxiety: mood is stable. Follows with his PCP.   - Continue escitalopram    11. Steroid induced hyperglycemia: last AIC of 5.3 on 11/15/21. BS have ranged from low 100s to 150s.   - On Lantus 3 U daily, plan to discontinue  in the next few weeks    12. Multifocal acute to subacute ischemic stroke: etiology likely embolic vs perioperative. MRI brain 10/23 with infarcts noted in both cerebral hemispheres, left cerebellum, presumed associated with new Afib vs perioperative. Bilateral upper extremity paresis (R>L), suspicion for some cortical blindness. SBP goal < 140.   - Continue warfarin, HTN and BS control and rosuvastatin  - Needs to schedule f/u with Neurology    13. Afib with RVR: noted 10/18, started on amiodarone drip and converted to SR, transitioned to PO 10/21, decreased 10/29. Metoprolol and amiodarone discontinued 11/19 d/t intermittent bradycardia.   - Zio patch today; will schedule f/u with EP   - On warfarin    14. Rheumatoid arthritis: previously treated with rituximab. Rheumatology familiar and consulted early in admission. Will need follow up.      15. Severe malnutrition: PEG/J tube removed 12/24 at bedside with improved PO intake. Weight has been declining, down to 139 lbs today.   -Continue multivitamin, folate, B6, B12 supplements  - May need dietician to f/u with patient     16. H/o GIB: recent EGD 11/5 with ulcer noted near the PEG bumper site, gastritis, suction marks from the G-tube. GJ tube was exchanged 11/9 by GI, removed on 12/24.   -Continue PPI BID    RTC: next week prior to bronch  Influenza and other vaccinations: Evushshruti completed on 1/7; flu vaccine today; will need covid vaccine in the next 1-2 weeks  Annual dermatology visit: did not discuss    Angela Luna PA-C  Pulmonary, Allergy, Critical Care and Sleep Medicine        Interval History:     Continues with home PT/OT, walking the halls. Feels like he is getting stronger. Does have some shortness of breath with walking and therapy, but feels like it's getting better. No fever or chills, does have a morning cough, not productive and stale. No chest pain or palpitations. No nausea or vomiting, stools are soft, no diarrhea. Drinking about 70 oz per  day.          Review of Systems:   Please see HPI, otherwise the complete 10 point ROS is negative.           Past Medical and Surgical History:     Past Medical History:   Diagnosis Date     BRENNAN (acute kidney injury) (H) 10/17/2021     Anxiety      Depression      HLD (hyperlipidemia)      HTN (hypertension)      Hypothyroidism      ILD (interstitial lung disease) (H)      SALTY on CPAP      Oxygen dependent     BL 4L since ~6/2021     Rheumatoid arthritis (H)     signs ~5/2020, dx 5/2021     S/P lung transplant (H) 10/16/2021     Shock liver 10/17/2021     Steroid-induced hyperglycemia      Traction bronchiectasis (H)      Past Surgical History:   Procedure Laterality Date     COLONOSCOPY W/ BIOPSIES AND POLYPECTOMY  07/21/2020     CV CORONARY ANGIOGRAM N/A 09/08/2021    Procedure: Coronary Angiogram with possible intervention;  Surgeon: Jovon Bullock MD;  Location:  HEART CARDIAC CATH LAB     CV RIGHT HEART CATH MEASUREMENTS RECORDED N/A 09/08/2021    Procedure: Right Heart Cath;  Surgeon: Jovon Bullock MD;  Location:  HEART CARDIAC CATH LAB     ESOPHAGOSCOPY, GASTROSCOPY, DUODENOSCOPY (EGD), COMBINED N/A 10/23/2021    Procedure: ESOPHAGOGASTRODUODENOSCOPY (EGD);  Surgeon: Miquel Pisano MD;  Location:  GI     ESOPHAGOSCOPY, GASTROSCOPY, DUODENOSCOPY (EGD), COMBINED N/A 11/02/2021    Procedure: ESOPHAGOGASTRODUODENOSCOPY (EGD);  Surgeon: Daniel Ortiz MD;  Location:  GI     ESOPHAGOSCOPY, GASTROSCOPY, DUODENOSCOPY (EGD), COMBINED N/A 11/5/2021    Procedure: ESOPHAGOGASTRODUODENOSCOPY (EGD);  Surgeon: Ronnell Hernandez MD;  Location:  GI     EXTRACTION(S) DENTAL N/A 09/22/2021    Procedure: EXTRACTION tooth #19;  Surgeon: Deepak Tobin DDS;  Location:  OR     HERNIA REPAIR       IR CHEST TUBE PLACEMENT NON-TUNNELLED LEFT  11/03/2021     PICC TRIPLE LUMEN PLACEMENT Left 11/04/2021    5FR TL PICC. Right non occlusive thrombus subclavian vein.     REPLACE  GASTROJEJUNOSTOMY TUBE, PERCUTANEOUS N/A 11/9/2021    Procedure: REPLACEMENT, GASTROJEJUNOSTOMY TUBE, PERCUTANEOUS;  Surgeon: Hernando Rodriguez MD;  Location: [facility] GI     right acl       TRACHEOSTOMY PERCUTANEOUS N/A 10/29/2021    Procedure: Percutaneous Tracheostomy,;  Surgeon: Celine Jenkins MD;  Location: [facility] OR     TRANSPLANT LUNG RECIPIENT SINGLE X2 Bilateral 10/16/2021    Procedure: TRANSPLANT, LUNG, RECIPIENT, BILATERAL, Bronchoscopy, on-pump perfusion, bilateral clamshell sternotomy;  Surgeon: Yanick Corral MD;  Location: [facility] OR     XR ACROMIOCLAVICULAR JOINT BILATERAL             Family History:     Family History   Problem Relation Age of Onset     Diabetes Type 1 Mother      Heart Disease Mother      Chronic Obstructive Pulmonary Disease Mother      Rheumatoid Arthritis Father      Emphysema Paternal Grandfather             Social History:     Social History     Socioeconomic History     Marital status: Single     Spouse name: Not on file     Number of children: Not on file     Years of education: Not on file     Highest education level: Not on file   Occupational History     Not on file   Tobacco Use     Smoking status: Never Smoker     Smokeless tobacco: Never Used   Substance and Sexual Activity     Alcohol use: Never     Drug use: Never     Sexual activity: Not on file   Other Topics Concern     Parent/sibling w/ CABG, MI or angioplasty before 65F 55M? Not Asked   Social History Narrative     Not on file     Social Determinants of Health     Financial Resource Strain: Not on file   Food Insecurity: Not on file   Transportation Needs: Not on file   Physical Activity: Not on file   Stress: Not on file   Social Connections: Not on file   Intimate Partner Violence: Not on file   Housing Stability: Not on file            Medications:     Current Outpatient Medications   Medication     valACYclovir (VALTREX) 1000 mg tablet     acetaminophen (TYLENOL) 325 MG tablet     amLODIPine (NORVASC) 5 MG  "tablet     calcium carbonate 600 mg-vitamin D 400 units (CALTRATE) 600-400 MG-UNIT per tablet     escitalopram (LEXAPRO) 20 MG tablet     fluticasone (FLONASE) 50 MCG/ACT nasal spray     folic acid-vit B6-vit B12 (FOLGARD) 0.8-10-0.115 MG TABS per tablet     insulin glargine (LANTUS PEN) 100 UNIT/ML pen     levalbuterol (XOPENEX HFA) 45 MCG/ACT inhaler     levothyroxine (SYNTHROID/LEVOTHROID) 25 MCG tablet     magnesium oxide (MAG-OX) 400 MG tablet     Melatonin 10 MG TABS tablet     multivitamin w/minerals (THERA-VIT-M) tablet     mycophenolate (GENERIC EQUIVALENT) 250 MG capsule     nystatin (MYCOSTATIN) 409899 UNIT/ML suspension     ondansetron (ZOFRAN-ODT) 4 MG ODT tab     pantoprazole (PROTONIX) 40 MG EC tablet     predniSONE (DELTASONE) 5 MG tablet     rosuvastatin (CRESTOR) 10 MG tablet     sulfamethoxazole-trimethoprim (BACTRIM) 400-80 MG tablet     tacrolimus (GENERIC EQUIVALENT) 1 MG capsule     warfarin ANTICOAGULANT (COUMADIN) 1 MG tablet     Current Facility-Administered Medications   Medication     influenza recomb quadrivalent PF (FLUBLOK) injection 0.5 mL            Physical Exam:   /86 (BP Location: Right arm, Patient Position: Chair, Cuff Size: Adult Regular)   Pulse 91   Ht 1.646 m (5' 4.8\")   Wt 63 kg (139 lb)   SpO2 96%   BMI 23.27 kg/m      GENERAL: alert, NAD  HEENT: NCAT, EOMI, no scleral icterus, oral mucosa moist and without lesions  Neck: no cervical or supraclavicular adenopathy  Lungs: moderate airflow, decreased in bases  CV: RRR, S1S2, no murmurs noted  Abdomen: normoactive BS, soft  Lymph: no edema  Neuro: AAO X 3, CN 2-12 grossly intact  Psychiatric: normal affect, good eye contact  Skin: maculopapular lesion below right lip with few smaller scattered lesions on chin         Data:   All laboratory and imaging data reviewed.      Recent Results (from the past 168 hour(s))   Tacrolimus by Tandem Mass Spectrometry    Collection Time: 01/13/22 12:05 PM   Result Value Ref Range "    Tacrolimus by Tandem Mass Spectrometry 23.3 (HH) 5.0 - 15.0 ug/L    Tacrolimus Last Dose Date      Tacrolimus Last Dose Time     Extra Red Top Tube    Collection Time: 01/13/22 12:05 PM   Result Value Ref Range    Hold Specimen JIC    Extra Green Top (Lithium Heparin) Tube    Collection Time: 01/13/22 12:05 PM   Result Value Ref Range    Hold Specimen JIC    INR point of care    Collection Time: 01/14/22  9:44 AM   Result Value Ref Range    INR 2.5 (H) 0.9 - 1.1   Basic metabolic panel    Collection Time: 01/17/22  8:53 AM   Result Value Ref Range    Sodium 143 133 - 144 mmol/L    Potassium 4.3 3.4 - 5.3 mmol/L    Chloride 107 94 - 109 mmol/L    Carbon Dioxide (CO2) 26 20 - 32 mmol/L    Anion Gap 10 3 - 14 mmol/L    Urea Nitrogen 16 7 - 30 mg/dL    Creatinine 1.05 0.66 - 1.25 mg/dL    Calcium 9.9 8.5 - 10.1 mg/dL    Glucose 98 70 - 99 mg/dL    GFR Estimate 83 >60 mL/min/1.73m2   Magnesium    Collection Time: 01/17/22  8:53 AM   Result Value Ref Range    Magnesium 1.7 1.6 - 2.3 mg/dL   CBC with platelets    Collection Time: 01/17/22  8:53 AM   Result Value Ref Range    WBC Count 11.2 (H) 4.0 - 11.0 10e3/uL    RBC Count 4.81 4.40 - 5.90 10e6/uL    Hemoglobin 13.0 (L) 13.3 - 17.7 g/dL    Hematocrit 40.8 40.0 - 53.0 %    MCV 85 78 - 100 fL    MCH 27.0 26.5 - 33.0 pg    MCHC 31.9 31.5 - 36.5 g/dL    RDW 15.1 (H) 10.0 - 15.0 %    Platelet Count 164 150 - 450 10e3/uL   CMV DNA quantification    Collection Time: 01/17/22  8:53 AM    Specimen: Arm, Right; Blood   Result Value Ref Range    CMV DNA IU/mL Not Detected Not Detected IU/mL   Tacrolimus level    Collection Time: 01/17/22  8:53 AM   Result Value Ref Range    Tacrolimus by Tandem Mass Spectrometry 8.8 5.0 - 15.0 ug/L    Tacrolimus Last Dose Date      Tacrolimus Last Dose Time     INR point of care    Collection Time: 01/17/22  8:53 AM   Result Value Ref Range    INR 1.8 (H) 0.9 - 1.1   CMV DNA quantification    Collection Time: 01/17/22  8:53 AM    Specimen: Arm,  Right; Blood   Result Value Ref Range    CMV DNA IU/mL Not Detected Not Detected IU/mL   Phosphorus    Collection Time: 01/17/22  8:53 AM   Result Value Ref Range    Phosphorus 2.4 (L) 2.5 - 4.5 mg/dL   HLA Donor Specific Antibody    Collection Time: 01/17/22  8:53 AM   Result Value Ref Range    Donor Identification 10/16/2021     Organ Lung     DSA Present YES     DQB5 1849     DSA Comments        Flow Single Antigen Beads assays are intended for detection/identification of IgG anti-HLA antibodies. Mfi values may not accurately quantify donor-specific antibody levels in all instances.    DSA Test Method SA FCS    HLA Tal Class I, Single Antigen    Collection Time: 01/17/22  8:53 AM   Result Value Ref Range    SA 1 TEST METHOD SA FCS     SA 1 CELL Class I     SA1 HI RISK TAL None     SA1 MOD RISK TAL B:7     SA 1  COMMENTS        Test performed by modified procedure. Serum heat inactivated and tested by a modified ([place]) protocol including fetal calf serum addition. High-risk, mfi >3,000. Mod-risk, mfi 500-3,000.   HLA Tal, CPRA    Collection Time: 01/17/22  8:53 AM   Result Value Ref Range    UNOS CPRA 26     UNACCEPTABLE ANTIGENS DQ:5       HLA Tal Class II, Single Antigen    Collection Time: 01/17/22  8:53 AM   Result Value Ref Range    SA 2 TEST METHOD SA FCS     SA 2 CELL Class II     SA2 HI RISK TAL None     SA2 MOD RISK TAL DQ:5     SA 2 COMMENTS        Test performed by modified procedure. Serum heat inactivated and tested by a modified ([place]) protocol including fetal calf serum addition. High-risk, mfi >3,000. Mod-risk, mfi 500-3,000.   INR    Collection Time: 01/19/22  9:16 AM   Result Value Ref Range    INR 1.58 (H) 0.85 - 1.15   General PFT Lab (Please always keep checked)    Collection Time: 01/19/22  9:25 AM   Result Value Ref Range    FVC-Pred 3.98 L    FVC-Pre 2.21 L    FVC-%Pred-Pre 55 %    FEV1-Pre 1.31 L    FEV1-%Pred-Pre 41 %    FEV1FVC-Pred 79 %    FEV1FVC-Pre 59 %    FEFMax-Pred 8.37 L/sec     FEFMax-Pre 4.05 L/sec    FEFMax-%Pred-Pre 48 %    FEF2575-Pred 2.83 L/sec    FEF2575-Pre 0.62 L/sec    CYX5128-%Pred-Pre 21 %    ExpTime-Pre 8.63 sec    FIFMax-Pre 4.05 L/sec    FEV1FEV6-Pred 80 %    FEV1FEV6-Pre 61 %     PFT interpretation:  Maneuver: valid and meets ATS guidelines  Severe obstruction with decreased FEV1/FVC and FEV1  Compared to prior: FEV1 of 1.31 is 70 ml above prior  The decrease in FVC may represent air trapping v. restrictive physiology.  Lung volumes would be necessary to determine.

## 2022-01-18 NOTE — PROGRESS NOTES
Bret is a 56 year old who is being evaluated via a billable video visit.      How would you like to obtain your AVS? MyChart  If the video visit is dropped, the invitation should be resent by: Text to cell phone: [phone]  Will anyone else be joining your video visit? No      Video Start Time: 5:57 PM  Video-Visit Details    Type of service:  Video Visit    Video End Time:6:05 PM    Originating Location (pt. Location): Other lodge    Distant Location (provider location):  [facility] [place]     Platform used for Video Visit: Katelynn Almonte CMA

## 2022-01-18 NOTE — LETTER
1/18/2022       RE: Edson Thornton  [address]  [place] [address]     Dear Colleague,    Thank you for referring your patient, Edson Thornton, to the [facility] INFECTIOUS DISEASE CLIN[facility] [place] at [facility]. Please see a copy of my visit note below.    [facility]    Transplant Infectious Diseases Outpatient Progress note      Patient:  Edson Thornton, Date of birth 1965, Medical record number [id number]  Date of Visit:  01/18/2022         Recommendations:   1. CT chest.   - decision regarding levaquin and fluconazole depends on CT findings.   2. Discuss COVID-19 vaccine in 6 months when the evusheld effect wanes.   3. Due for the seasonal influenza vaccine, repeat the shingrix vaccine series (was not given properly in the past), and the prevnar vaccine followed by pneumovax 8 weeks later.     RTC: depending on CT findings.         Summary of Presentation:   Transplants:  10/16/2021 (Lung), Postoperative day:  94     This patient is a 56 year old male with ILD due to RA was on MMF, rituximab and high dose steroids prior to lung transplant. S/p Bi lung transplant. Basiliximab for induction, TAC/MMF/prednisone for maintenance.         Active Problems and Infectious Diseases Issues:   1. LLL cavity.   Aspiration- vs pseudmonal (putida/fluorescens)- vs K pneumonia-induced abscess.   I am no sure that this is new or was obscured by pleural effusion on prior CT.   We treated with double coverage ABx (ceftazidime/levofloxacine) for the first few weeks until 12/7/2021 then single coverage with ceftazidime followed by levaquin (currently week 8 of therapy).   The follow up CT showed improvement but no resolution of the LLL cavity so we continued ABx  for an additional 4 weeks with a plant to repeat CT again today 1/18/2022 but that was not scheduled.  Will arrange for the follow up CT chest and continue levaquin pending  the results.      He tolerated the combination of levaquin/fluconazole in the near past without QTc prolongation.      This is less likely to be fungal infection including endemic fungi. Recent Cocci Ag in the urine was negative (resided in the past in endemic areas). Also Histoplasma rarely reactivates (donor and recipient from an endemic areas) and the patient is already on fluconazole which is active against both Histoplasma and Cocci. Will check urinary Histo to be on the safe side (serum Histo was negative). CRAG was negative on 10/29/2021.      2. Invasive candidiasis with C albicans.   Left pleural empyema and candidemia.   The candidemia was positive 10/20/2021 and 10/22/2021, first negative blood cx on 10/23/2021.   The left empyema treated with chest tube 10/18/2021 followed by antifungals starting 10/22/2021.   The right loculated effusion was drained 10/27/2021 with exudate but no evidence of empyema.      The follow up CT was still with loculated effusion but is improving. We extended the therapy for additional 4 weeks (total of 12 weeks) pending repeat CT which was not scheduled.  Will arrange for follow up CT and continue fluconazole pending results.       3. Granuloma on CT chest prior to transplant   This was not seen in the pathology of the explanted lungs also no evidence of mycobacterial or fungal infection of the resected LN.   There would be no further indications to check monthly Cocci Ag in the urine.          Old Problems and Infectious Diseases Issues:   1. Significant travel history throughout [place]; was given ivermectin empirically prior to transplant. Serology was negative but was on ritiximab/MMF/high dose steroids when checked.   2. Indeterminate QuantiFERON while on rituximab/MMF/high dose steroids; was in  and traveled to endemic areas but tubeculin skin test was always negative on multiple occasions prior to becoming immunocompromised. Was not deemed to have LTBI.      Other  Infectious Disease issues include:  - QTc 377 as of 12/7/2021.   - PCP prophylaxis: bactrim.   - received evusheld.   - Serostatus: CMV D-/R-, EBV D+/R+, HSV1+/2+, VZV +, Toxo -/-. Received ACV ppx until 11/15/2021.    - Immunization status: received evusheld, so not a candidate for the COVID-19 vaccine. However, a candidate for the seasonal infulenza vaccine, prevenar followed by pneumovax 8 months later. Received shingrix vaccine but 9 months apart instead of 6 months apart, would revaccinate.    - Gamma globulin status: 365 as of 12/15/2021, received IVIG on 10/21/2021 and 11/18/2021.       Attestation:  Total duration of visit including chart review, reviewing labs and imaging, interviewing and examining the patient, documentation, and sending communication to the patient and to the primary treating team, all at the same day of this encounter, is: 30 minutes.   Ming Abebe MD    Contact information available via [facility] Paging/Directory     01/18/2022         Interim History:   The patient was discharged from ARU.   No fever, no chills.   Still requiring physical therapy.   No other complaints.         HPI:       Transplants:  10/16/2021 (Lung); Postoperative day:  94    This patient is a 56 year old male with ILD due to RA was on MMF, rituximab and high dose steroids prior to lung transplant. S/p Bi lung transplant.   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   Also complicated by candidemia with C albicans on 10/22/2021 in the setting of respiratory samples following transplantation with C albicans. Ophthalmology exam was unremarkable.   micafungin was started on 10/22/2021 and switched to fluconazole on 10/26/2021.   The first negative blood cx were from 10/23/2021.   The patient also underwent left chest tube placement.      On repeat CT chest on 11/22/2021, he was found to have LLL cavity and being treated with antibiotics.       Exposure History in a prior visit:  Was born in  [place], lived in many states including [facility] and [facility]. Currently lives in [place] in a house with his girlfriend/fiancee and her son who's fully vaccinated. No pets. He used to work as a  all over the country.   He used to be in the [employer] and was stationed in [place] and [facility]. He also lived in [place].   He was tested for TB by [facility] on many occasions and was negative.   No significant outdoors exposure.   Institutionalized for ETOH use in the past.            Past Medical History:     Past Medical History:   Diagnosis Date     BRENNAN (acute kidney injury) (H) 10/17/2021     Anxiety      Depression      HLD (hyperlipidemia)      HTN (hypertension)      Hypothyroidism      ILD (interstitial lung disease) (H)      SALTY on CPAP      Oxygen dependent     BL 4L since ~6/2021     Rheumatoid arthritis (H)     signs ~5/2020, dx 5/2021     S/P lung transplant (H) 10/16/2021     Shock liver 10/17/2021     Steroid-induced hyperglycemia      Traction bronchiectasis (H)              Past Surgical History:     Past Surgical History:   Procedure Laterality Date     COLONOSCOPY W/ BIOPSIES AND POLYPECTOMY  07/21/2020     CV CORONARY ANGIOGRAM N/A 09/08/2021    Procedure: Coronary Angiogram with possible intervention;  Surgeon: Jovon Bullock MD;  Location:  HEART CARDIAC CATH LAB     CV RIGHT HEART CATH MEASUREMENTS RECORDED N/A 09/08/2021    Procedure: Right Heart Cath;  Surgeon: Jovon Bullock MD;  Location: [facility] CARDIAC CATH LAB     ESOPHAGOSCOPY, GASTROSCOPY, DUODENOSCOPY (EGD), COMBINED N/A 10/23/2021    Procedure: ESOPHAGOGASTRODUODENOSCOPY (EGD);  Surgeon: Miquel Pisano MD;  Location:  GI     ESOPHAGOSCOPY, GASTROSCOPY, DUODENOSCOPY (EGD), COMBINED N/A 11/02/2021    Procedure: ESOPHAGOGASTRODUODENOSCOPY (EGD);  Surgeon: Daniel Ortiz MD;  Location:  GI     ESOPHAGOSCOPY, GASTROSCOPY, DUODENOSCOPY (EGD), COMBINED N/A 11/5/2021    Procedure:  ESOPHAGOGASTRODUODENOSCOPY (EGD);  Surgeon: Ronnell Hernandez MD;  Location: [facility] GI     EXTRACTION(S) DENTAL N/A 09/22/2021    Procedure: EXTRACTION tooth #19;  Surgeon: Deepak Tobin DDS;  Location: [facility] OR     HERNIA REPAIR       IR CHEST TUBE PLACEMENT NON-TUNNELLED LEFT  11/03/2021     PICC TRIPLE LUMEN PLACEMENT Left 11/04/2021    5FR TL PICC. Right non occlusive thrombus subclavian vein.     REPLACE GASTROJEJUNOSTOMY TUBE, PERCUTANEOUS N/A 11/9/2021    Procedure: REPLACEMENT, GASTROJEJUNOSTOMY TUBE, PERCUTANEOUS;  Surgeon: Hernando Rodriguez MD;  Location: [facility] GI     right acl       TRACHEOSTOMY PERCUTANEOUS N/A 10/29/2021    Procedure: Percutaneous Tracheostomy,;  Surgeon: Celine Jenkins MD;  Location: [facility] OR     TRANSPLANT LUNG RECIPIENT SINGLE X2 Bilateral 10/16/2021    Procedure: TRANSPLANT, LUNG, RECIPIENT, BILATERAL, Bronchoscopy, on-pump perfusion, bilateral clamshell sternotomy;  Surgeon: Yanick Corral MD;  Location: [facility] OR     XR ACROMIOCLAVICULAR JOINT BILATERAL                 Social History:     Social History     Tobacco Use     Smoking status: Never Smoker     Smokeless tobacco: Never Used   Substance Use Topics     Alcohol use: Never             Immunizations:     Immunization History   Administered Date(s) Administered     Tdap (Adacel,Boostrix) 02/15/2018     Zoster vaccine recombinant adjuvanted (SHINGRIX) 10/22/2018, 07/09/2019             Allergies:   No Known Allergies          Medications:     Current Outpatient Medications   Medication Sig     acetaminophen (TYLENOL) 325 MG tablet 3 tablets (975 mg) by Oral or Feeding Tube route every 8 hours as needed for mild pain     amLODIPine (NORVASC) 5 MG tablet Take 1 tablet (5 mg) by mouth daily     calcium carbonate 600 mg-vitamin D 400 units (CALTRATE) 600-400 MG-UNIT per tablet 1 tablet by Oral or Feeding Tube route 2 times daily (with meals)     escitalopram (LEXAPRO) 20 MG tablet Take 1 tablet (20 mg) by mouth daily      fluconazole (DIFLUCAN) 400-0.9 MG/200ML-% infusion Inject 200 mLs (400 mg) into the vein every 24 hours for 18 days     fluticasone (FLONASE) 50 MCG/ACT nasal spray Spray 1 spray into both nostrils daily     folic acid-vit B6-vit B12 (FOLGARD) 0.8-10-0.115 MG TABS per tablet Take 1 tablet by mouth daily     insulin glargine (LANTUS PEN) 100 UNIT/ML pen Inject 5 Units Subcutaneous every morning     levalbuterol (XOPENEX HFA) 45 MCG/ACT inhaler Inhale 2 puffs into the lungs every 4 hours as needed for wheezing or shortness of breath / dyspnea     levofloxacin (LEVAQUIN) 500 MG tablet Take 1 tablet (500 mg) by mouth daily     levothyroxine (SYNTHROID/LEVOTHROID) 25 MCG tablet Take 1 tablet (25 mcg) by mouth daily     magnesium oxide (MAG-OX) 400 MG tablet Take 2 tablets (800 mg) by mouth 2 times daily     Melatonin 10 MG TABS tablet Take 1 tablet (10 mg) by mouth nightly as needed for sleep     multivitamin w/minerals (THERA-VIT-M) tablet Take 1 tablet by mouth daily     mycophenolate (GENERIC EQUIVALENT) 250 MG capsule Take 5 capsules (1,250 mg) by mouth 2 times daily     nystatin (MYCOSTATIN) 495118 UNIT/ML suspension Swish and swallow 10 mLs (1,000,000 Units) in mouth 4 times daily     ondansetron (ZOFRAN-ODT) 4 MG ODT tab Take 1 tablet (4 mg) by mouth every 6 hours as needed for nausea     pantoprazole (PROTONIX) 40 MG EC tablet Take 1 tablet (40 mg) by mouth 2 times daily (before meals)     predniSONE (DELTASONE) 5 MG tablet Take 10 mg in am and 7.5 mg (1 and a half tabs) in afternoon. Then on 1/30 decrease dose to 7.5mg in the AM and 7.5mg in the PM. Further taper as outlined by transplant team.     rosuvastatin (CRESTOR) 10 MG tablet Take 1 tablet (10 mg) by mouth daily     sulfamethoxazole-trimethoprim (BACTRIM) 400-80 MG tablet Take 1 tablet by mouth daily     tacrolimus (GENERIC EQUIVALENT) 1 MG capsule Take 1 capsule (1 mg) by mouth 2 times daily Total dose 1 mg in the AM and 1 mg in the PM     traZODone  (DESYREL) 50 MG tablet Take 0.5 tablets (25 mg) by mouth nightly as needed for sleep     warfarin ANTICOAGULANT (COUMADIN) 1 MG tablet Take 2 tablets (2 mg) by mouth every evening Further dosing per anticoagulation clinic (Patient taking differently: Take 0.5-1 mg by mouth every evening Further dosing per anticoagulation clinic)     No current facility-administered medications for this visit.            Review of Systems:     As mentioned in the interim history otherwise negative by reviewing constitutional symptoms, central and peripheral neurological systems, respiratory system, cardiac system, GI system,  system, musculoskeletal, skin, allergy, and lymphatics.                  Physical Exam:   There were no vitals filed for this visit.   No vitals are available during this virtual visit.   The patient did not look short of breath or distressed during the video visit.   Constitutional: awake, alert, cooperative, no apparent distress and appears at stated age, well nourished.            Laboratory Data:     No results found for: ACD4    Inflammatory Markers    Recent Labs   Lab Test 10/29/21  0542 10/14/21  0943 10/12/21  1038 10/10/21  1024 10/08/21  0947 10/06/21  1007 10/04/21  0432 10/02/21  0528   SED 87*  --   --   --   --   --   --   --    CRP 53.0* 23.0* 17.0* 30.0* 34.0* 35.0* 23.0* 28.0*       Immune Globulin Studies      Recent Labs   Lab Test 01/03/22  0743 12/30/21  0730 12/15/21  0647 11/17/21  0337 10/15/21  0907 09/07/21  1324 09/07/21  1100   * 446* 365* 198* 265* 363*  363*  --    IGM  --   --   --   --   --  51  --    IGE  --   --   --   --   --   --  83   IGA  --   --   --   --   --  103  --        Metabolic Studies    Recent Labs   Lab Test 01/17/22  0853 01/10/22  0919 01/07/22  0836 01/05/22  1030 01/03/22  0743 12/30/21  0811 12/30/21  0730 12/16/21  1912 12/16/21  1757 12/02/21  2030 12/02/21  1816 11/15/21  0749 11/15/21  0344 10/22/21  1602 10/22/21  1601 10/22/21  0959  10/22/21  0958 09/07/21 2057 09/07/21  1324    142 142 140 141  --  139   < >  --    < >  --    < > 140  140   < > 147*  --  147*   < > 138   POTASSIUM 4.3 3.7 3.8 3.9 4.4  --  4.6   < >  --    < >  --    < > 4.6  4.6   < > 3.6  --  3.5   < > 3.9   CHLORIDE 107 106 104 104 105  --  105   < >  --    < >  --    < > 106  106   < > 109  --  106   < > 107   CO2 26 24 25 27 22  --  27   < >  --    < >  --    < > 31  31   < > 31  --  34*   < > 25   ANIONGAP 10 12 13 9 14  --  7   < >  --    < >  --    < > 3  3   < > 7  --  7   < > 6   BUN 16 23 30 27 39*  --  24   < >  --    < >  --    < > 37*  37*   < > 68*  --  62*   < > 23   CR 1.05 1.19 1.67* 1.45* 1.79*  --  1.00   < >  --    < >  --    < > 0.47*  0.47*   < > 1.47*  --  1.54*   < > 0.69   GFRESTIMATED 83 72 48* 57* 44*  --  88   < >  --    < >  --    < > >90  >90   < > 53*  --  50*   < > >90   GLC 98 94 84 86 114* 100* 100*   < >  --    < >  --    < > 191*  191*   < > 148*   < > 92   < > 151*   A1C  --   --   --   --   --   --   --   --   --   --   --   --  5.3  --   --   --   --   --   --    ERIK 9.9 9.4 9.9 9.6 10.2*  --  9.7   < >  --    < >  --    < > 8.7  8.7   < > 8.2*  --  7.9*   < > 8.8   PHOS 2.4*  --   --  2.4*  --   --   --   --   --    < >  --    < > 3.6   < > 2.8  --   --    < >  --    MAG 1.7 1.5* 1.6 1.4* 1.4*  --  1.5*   < >  --    < >  --    < > 1.5*   < > 2.1  --   --    < >  --    LACT  --   --   --   --   --   --   --   --  1.6  --  1.7   < >  --    < >  --    < >  --    < >  --    CKT  --   --   --   --   --   --   --   --   --   --   --   --   --   --  51  --  55  --  41    < > = values in this interval not displayed.       Hepatic Studies    Recent Labs   Lab Test 12/23/21  0714 12/12/21  0519 12/10/21  0647 12/08/21  0604 12/07/21  0605 12/06/21  0554 09/30/21  1059 09/19/21  1629   BILITOTAL 0.2 0.2 0.5 0.2 <0.1* 0.2   < >  --    ALKPHOS 91 104 105 107 106 114   < >  --    PROTTOTAL 6.0* 5.8* 5.9* 5.7* 5.8* 6.1*   < >  --     ALBUMIN 2.8* 2.5* 2.5* 2.3* 2.3* 2.4*   < >  --    AST 22 17 19 17 22 26   < >  --    ALT 31 26 24 25 27 32   < >  --    LDH  --   --   --   --   --   --   --  423*    < > = values in this interval not displayed.       Hematology Studies     Recent Labs   Lab Test 01/17/22  0853 01/10/22  0919 01/07/22  0836 01/05/22  1030 01/03/22  0743 12/30/21  0730 11/02/21  1302 11/02/21  1053 11/02/21  0832 11/02/21  0815 11/02/21  0556 11/01/21  0818 10/25/21  0326 10/24/21  0410 10/23/21  0344   WBC 11.2* 10.7 13.5* 11.4* 14.2* 11.1*   < > 57.4*  57.4*   < > 37.5* 32.6* 28.4*   < > 21.4* 21.3*   ANEU  --   --   --   --   --   --   --  49.9*  --  28.5* 29.7* 25.3*  --  19.9* 20.0*   ALYM  --   --   --   --   --   --   --  4.0  --  4.9 1.3 1.1  --  0.4* 0.2*   DONALD  --   --   --   --   --   --   --  1.7*  --  2.3* 0.7 1.1  --  0.9 1.1   AEOS  --   --   --   --   --   --   --  0.0  --  0.0 0.0 0.0  --  0.0 0.0   HGB 13.0* 12.9* 12.8* 12.4* 14.1 11.5*   < > 8.8*   < > 5.8* 8.2* 9.6*   < > 9.8* 9.6*   HCT 40.8 40.6 39.4* 38.6* 43.4 36.4*   < > 26.8*   < > 19.6* 27.3* 31.4*   < > 30.5* 30.2*    168 185 197 237 197   < > 244   < > 382 355 308   < > 201 132*    < > = values in this interval not displayed.       Clotting Studies    Recent Labs   Lab Test 01/17/22  0853 01/14/22  0944 01/12/22  1009 01/12/22  1008 11/03/21  0407 11/02/21  1053 11/02/21  0927 11/02/21  0908 11/02/21  0832 10/23/21  0344 10/22/21  1407   INR 1.8* 2.5* 2.4* 2.18*   < > 1.55*  --    < > 1.38*   < > 1.28*   PTT  --   --   --   --   --  34 32  --  50*  --  43*    < > = values in this interval not displayed.       Urine Studies    Recent Labs   Lab Test 11/01/21  1336 10/25/21  1507 10/22/21  1641 10/17/21  1642 10/17/21  1041   URINEPH 5.5 5.5 7.5* 5.0 5.0   NITRITE Negative Negative Negative Negative Negative   LEUKEST Negative Negative Negative Negative Trace*   WBCU 0 2 3 25* 44*         Microbiology:  Last 6 Culture results with specimen  source  No results found for: CULT No results found for: SDES     Last check of C difficile  C Difficile Toxin B by PCR   Date Value Ref Range Status   11/20/2021 Negative Negative Final     Comment:     A negative result does not exclude actual disease due to C. difficile and may be due to improper collection, handling and storage of the specimen or the number of organisms in the specimen is below the detection limit of the assay.         Virology:  CMV viral loads    CMV viral loads    CMV DNA IU/mL   Date Value Ref Range Status   01/17/2022 Not Detected Not Detected IU/mL Final   01/17/2022 Not Detected Not Detected IU/mL Final     CMV viral loads    Recent Labs   Lab Test 01/17/22  0853   CMVQNT Not Detected  Not Detected       CMV viral loads    CMV DNA IU/mL   Date Value Ref Range Status   01/17/2022 Not Detected Not Detected IU/mL Final   01/17/2022 Not Detected Not Detected IU/mL Final   01/10/2022 Not Detected Not Detected IU/mL Final   01/07/2022 Not Detected Not Detected IU/mL Final   01/07/2022 Not Detected Not Detected IU/mL Final   12/15/2021 Not Detected Not Detected IU/mL Final   11/22/2021 Not Detected Not Detected IU/mL Final   11/16/2021 Not Detected Not Detected IU/mL Final   11/12/2021 Not Detected Not Detected IU/mL Final       CMV resistance testing  No lab results found.  No results found for: CMVCID, CMVFOS, CMVGAN     EBV viral loads     Recent Labs   Lab Test 01/07/22  0836 12/15/21  0647 11/16/21  0357   EBRES Not Detected Not Detected Not Detected     EBV DNA Copies/mL   Date Value Ref Range Status   01/07/2022 Not Detected Not Detected copies/mL Final   12/15/2021 Not Detected Not Detected copies/mL Final   11/16/2021 Not Detected Not Detected copies/mL Final       Human Herpes Virus 6 viral loads    No results found for: H6RES No results found for: H6SPEC    CMV Antibody IgG   Date Value Ref Range Status   10/15/2021 No detectable antibody. No detectable antibody.  Final   09/07/2021  No detectable antibody. No detectable antibody.  Final     Toxoplasma Antibody IgG   Date Value Ref Range Status   09/07/2021 <3.0 0.0 - 7.1 IU/mL Final     Comment:     Negative- Absence of detectable Toxoplasma gondii IgG antibodies. A negative result does not rule out acute infection.       Pathology:  Native explanted lungs 10/16/2021  A.  LUNG, LEFT NATIVE, PNEUMONECTOMY:  -Nonspecific interstitial pneumonitis (NSIP), predominant fibrotic pattern, with superimposed organizing pneumonia, reactive pneumocyte type II hyperplasia, intra-alveolar clusters of pigmented macrophages, and areas of end-stage fibrosis.  -Three benign hilar lymph nodes.  B.  LUNG, RIGHT NATIVE, PNEUMONECTOMY:  -Nonspecific interstitial pneumonitis (NSIP), predominant fibrotic pattern, with superimposed organizing pneumonia, reactive pneumocyte type II hyperplasia, intra-alveolar clusters of pigmented macrophages, and areas of end-stage fibrosis.  -Four benign hilar lymph nodes.  C.  Lymph node, level 5, excision:  -Fragments of lymph node, negative for malignancy.      Imaging:  Imaging:  CT chest 12/20/2021 reviewed by myself.   IMPRESSION:   1. Overall decreased (since 11/22/2021 chest CT) size and number of  bilateral infectious/inflammatory groundglass opacities.  2. Decreased but persistent left lower lobe cavitary lesion.  3. Decreased bilateral pleural loculated effusions.  CT chest 11/22/21   IMPRESSION:   1. New cavitary lesion in the left lung base with multifocal  groundglass opacities some of which are new compared to CT 11/2/2021,  notably in the bilateral upper lobes concerning for ongoing infection.  2. Decreased bilateral loculated pleural effusions.  3. New 1.8 cm fluid collection with surrounding fat stranding in the  left axilla.  4. Similar small pericardial effusion.      Bret is a 56 year old who is being evaluated via a billable video visit.      How would you like to obtain your AVS? MyChart  If the video visit is  dropped, the invitation should be resent by: Text to cell phone: [phone]  Will anyone else be joining your video visit? No      Video Start Time: 5:57 PM  Video-Visit Details    Type of service:  Video Visit    Video End Time:6:05 PM    Originating Location (pt. Location): Other lodge    Distant Location (provider location):  [facility] [place]     Platform used for Video Visit: Katelynn Almonte CMA      Again, thank you for allowing me to participate in the care of your patient.      Sincerely,    Ming Abebe MD

## 2022-01-18 NOTE — RESULT ENCOUNTER NOTE
Tacrolimus level 8.8 at 12 hours, on 1/17.  Goal 8-12.   Current dose 1 mg in AM, 1 mg in PM    Level at goal, no change in dose.    [employer] message sent

## 2022-01-18 NOTE — LETTER
Date:January 19, 2022      Patient was self referred, no letter generated. Do not send.        [facility] Health Information

## 2022-01-18 NOTE — PROGRESS NOTES
[facility]    Transplant Infectious Diseases Outpatient Progress note      Patient:  Edson Thornton, Date of birth 1965, Medical record number [id number]  Date of Visit:  01/18/2022         Recommendations:   1. CT chest.   - decision regarding levaquin and fluconazole depends on CT findings.   2. Discuss COVID-19 vaccine in 6 months when the evusheld effect wanes.   3. Due for the seasonal influenza vaccine, repeat the shingrix vaccine series (was not given properly in the past), and the prevnar vaccine followed by pneumovax 8 weeks later.     RTC: depending on CT findings.         Summary of Presentation:   Transplants:  10/16/2021 (Lung), Postoperative day:  94     This patient is a 56 year old male with ILD due to RA was on MMF, rituximab and high dose steroids prior to lung transplant. S/p Bi lung transplant. Basiliximab for induction, TAC/MMF/prednisone for maintenance.         Active Problems and Infectious Diseases Issues:   1. LLL cavity.   Aspiration- vs pseudmonal (putida/fluorescens)- vs K pneumonia-induced abscess.   I am no sure that this is new or was obscured by pleural effusion on prior CT.   We treated with double coverage ABx (ceftazidime/levofloxacine) for the first few weeks until 12/7/2021 then single coverage with ceftazidime followed by levaquin (currently week 8 of therapy).   The follow up CT showed improvement but no resolution of the LLL cavity so we continued ABx  for an additional 4 weeks with a plant to repeat CT again today 1/18/2022 but that was not scheduled.  Will arrange for the follow up CT chest and continue levaquin pending the results.      He tolerated the combination of levaquin/fluconazole in the near past without QTc prolongation.      This is less likely to be fungal infection including endemic fungi. Recent Cocci Ag in the urine was negative (resided in the past in endemic areas). Also Histoplasma rarely reactivates (donor and recipient  from an endemic areas) and the patient is already on fluconazole which is active against both Histoplasma and Cocci. Will check urinary Histo to be on the safe side (serum Histo was negative). CRAG was negative on 10/29/2021.      2. Invasive candidiasis with C albicans.   Left pleural empyema and candidemia.   The candidemia was positive 10/20/2021 and 10/22/2021, first negative blood cx on 10/23/2021.   The left empyema treated with chest tube 10/18/2021 followed by antifungals starting 10/22/2021.   The right loculated effusion was drained 10/27/2021 with exudate but no evidence of empyema.      The follow up CT was still with loculated effusion but is improving. We extended the therapy for additional 4 weeks (total of 12 weeks) pending repeat CT which was not scheduled.  Will arrange for follow up CT and continue fluconazole pending results.       3. Granuloma on CT chest prior to transplant   This was not seen in the pathology of the explanted lungs also no evidence of mycobacterial or fungal infection of the resected LN.   There would be no further indications to check monthly Cocci Ag in the urine.          Old Problems and Infectious Diseases Issues:   1. Significant travel history throughout [place]; was given ivermectin empirically prior to transplant. Serology was negative but was on ritiximab/MMF/high dose steroids when checked.   2. Indeterminate QuantiFERON while on rituximab/MMF/high dose steroids; was in  and traveled to endemic areas but tubeculin skin test was always negative on multiple occasions prior to becoming immunocompromised. Was not deemed to have LTBI.      Other Infectious Disease issues include:  - QTc 377 as of 12/7/2021.   - PCP prophylaxis: bactrim.   - received evusheld.   - Serostatus: CMV D-/R-, EBV D+/R+, HSV1+/2+, VZV +, Toxo -/-. Received ACV ppx until 11/15/2021.    - Immunization status: received evusheld, so not a candidate for the COVID-19 vaccine. However, a candidate  for the seasonal infulenza vaccine, prevenar followed by pneumovax 8 months later. Received shingrix vaccine but 9 months apart instead of 6 months apart, would revaccinate.    - Gamma globulin status: 365 as of 12/15/2021, received IVIG on 10/21/2021 and 11/18/2021.       Attestation:  Total duration of visit including chart review, reviewing labs and imaging, interviewing and examining the patient, documentation, and sending communication to the patient and to the primary treating team, all at the same day of this encounter, is: 30 minutes.   Ming Abebe MD    Contact information available via [facility] Paging/Directory     01/18/2022         Interim History:   The patient was discharged from ARU.   No fever, no chills.   Still requiring physical therapy.   No other complaints.         HPI:       Transplants:  10/16/2021 (Lung); Postoperative day:  94    This patient is a 56 year old male with ILD due to RA was on MMF, rituximab and high dose steroids prior to lung transplant. S/p Bi lung transplant.   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   Also complicated by candidemia with C albicans on 10/22/2021 in the setting of respiratory samples following transplantation with C albicans. Ophthalmology exam was unremarkable.   micafungin was started on 10/22/2021 and switched to fluconazole on 10/26/2021.   The first negative blood cx were from 10/23/2021.   The patient also underwent left chest tube placement.      On repeat CT chest on 11/22/2021, he was found to have LLL cavity and being treated with antibiotics.       Exposure History in a prior visit:  Was born in [place], lived in many states including [facility] and [facility]. Currently lives in [place] in a house with his girlfriend/fiancee and her son who's fully vaccinated. No pets. He used to work as a  all over the country.   He used to be in the [employer] and was stationed in [place] and [place]. He also lived in  [place].   He was tested for TB by [facility] on many occasions and was negative.   No significant outdoors exposure.   Institutionalized for ETOH use in the past.            Past Medical History:     Past Medical History:   Diagnosis Date     BRENNAN (acute kidney injury) (H) 10/17/2021     Anxiety      Depression      HLD (hyperlipidemia)      HTN (hypertension)      Hypothyroidism      ILD (interstitial lung disease) (H)      SALTY on CPAP      Oxygen dependent     BL 4L since ~6/2021     Rheumatoid arthritis (H)     signs ~5/2020, dx 5/2021     S/P lung transplant (H) 10/16/2021     Shock liver 10/17/2021     Steroid-induced hyperglycemia      Traction bronchiectasis (H)              Past Surgical History:     Past Surgical History:   Procedure Laterality Date     COLONOSCOPY W/ BIOPSIES AND POLYPECTOMY  07/21/2020     CV CORONARY ANGIOGRAM N/A 09/08/2021    Procedure: Coronary Angiogram with possible intervention;  Surgeon: Jovon Bullock MD;  Location:  HEART CARDIAC CATH LAB     CV RIGHT HEART CATH MEASUREMENTS RECORDED N/A 09/08/2021    Procedure: Right Heart Cath;  Surgeon: Jovon Bullock MD;  Location:  HEART CARDIAC CATH LAB     ESOPHAGOSCOPY, GASTROSCOPY, DUODENOSCOPY (EGD), COMBINED N/A 10/23/2021    Procedure: ESOPHAGOGASTRODUODENOSCOPY (EGD);  Surgeon: Miquel Pisano MD;  Location:  GI     ESOPHAGOSCOPY, GASTROSCOPY, DUODENOSCOPY (EGD), COMBINED N/A 11/02/2021    Procedure: ESOPHAGOGASTRODUODENOSCOPY (EGD);  Surgeon: Daniel Ortiz MD;  Location:  GI     ESOPHAGOSCOPY, GASTROSCOPY, DUODENOSCOPY (EGD), COMBINED N/A 11/5/2021    Procedure: ESOPHAGOGASTRODUODENOSCOPY (EGD);  Surgeon: Ronnell Hernandez MD;  Location:  GI     EXTRACTION(S) DENTAL N/A 09/22/2021    Procedure: EXTRACTION tooth #19;  Surgeon: Deepak Tobin DDS;  Location:  OR     HERNIA REPAIR       IR CHEST TUBE PLACEMENT NON-TUNNELLED LEFT  11/03/2021     PICC TRIPLE LUMEN PLACEMENT Left 11/04/2021     5FR TL PICC. Right non occlusive thrombus subclavian vein.     REPLACE GASTROJEJUNOSTOMY TUBE, PERCUTANEOUS N/A 11/9/2021    Procedure: REPLACEMENT, GASTROJEJUNOSTOMY TUBE, PERCUTANEOUS;  Surgeon: Hernando Rodriguez MD;  Location: [facility] GI     right acl       TRACHEOSTOMY PERCUTANEOUS N/A 10/29/2021    Procedure: Percutaneous Tracheostomy,;  Surgeon: Celine Jenkins MD;  Location: [facility] OR     TRANSPLANT LUNG RECIPIENT SINGLE X2 Bilateral 10/16/2021    Procedure: TRANSPLANT, LUNG, RECIPIENT, BILATERAL, Bronchoscopy, on-pump perfusion, bilateral clamshell sternotomy;  Surgeon: Yanick Corral MD;  Location: [facility] OR     XR ACROMIOCLAVICULAR JOINT BILATERAL                 Social History:     Social History     Tobacco Use     Smoking status: Never Smoker     Smokeless tobacco: Never Used   Substance Use Topics     Alcohol use: Never             Immunizations:     Immunization History   Administered Date(s) Administered     Tdap (Adacel,Boostrix) 02/15/2018     Zoster vaccine recombinant adjuvanted (SHINGRIX) 10/22/2018, 07/09/2019             Allergies:   No Known Allergies          Medications:     Current Outpatient Medications   Medication Sig     acetaminophen (TYLENOL) 325 MG tablet 3 tablets (975 mg) by Oral or Feeding Tube route every 8 hours as needed for mild pain     amLODIPine (NORVASC) 5 MG tablet Take 1 tablet (5 mg) by mouth daily     calcium carbonate 600 mg-vitamin D 400 units (CALTRATE) 600-400 MG-UNIT per tablet 1 tablet by Oral or Feeding Tube route 2 times daily (with meals)     escitalopram (LEXAPRO) 20 MG tablet Take 1 tablet (20 mg) by mouth daily     fluconazole (DIFLUCAN) 400-0.9 MG/200ML-% infusion Inject 200 mLs (400 mg) into the vein every 24 hours for 18 days     fluticasone (FLONASE) 50 MCG/ACT nasal spray Spray 1 spray into both nostrils daily     folic acid-vit B6-vit B12 (FOLGARD) 0.8-10-0.115 MG TABS per tablet Take 1 tablet by mouth daily     insulin glargine (LANTUS PEN) 100  UNIT/ML pen Inject 5 Units Subcutaneous every morning     levalbuterol (XOPENEX HFA) 45 MCG/ACT inhaler Inhale 2 puffs into the lungs every 4 hours as needed for wheezing or shortness of breath / dyspnea     levofloxacin (LEVAQUIN) 500 MG tablet Take 1 tablet (500 mg) by mouth daily     levothyroxine (SYNTHROID/LEVOTHROID) 25 MCG tablet Take 1 tablet (25 mcg) by mouth daily     magnesium oxide (MAG-OX) 400 MG tablet Take 2 tablets (800 mg) by mouth 2 times daily     Melatonin 10 MG TABS tablet Take 1 tablet (10 mg) by mouth nightly as needed for sleep     multivitamin w/minerals (THERA-VIT-M) tablet Take 1 tablet by mouth daily     mycophenolate (GENERIC EQUIVALENT) 250 MG capsule Take 5 capsules (1,250 mg) by mouth 2 times daily     nystatin (MYCOSTATIN) 709129 UNIT/ML suspension Swish and swallow 10 mLs (1,000,000 Units) in mouth 4 times daily     ondansetron (ZOFRAN-ODT) 4 MG ODT tab Take 1 tablet (4 mg) by mouth every 6 hours as needed for nausea     pantoprazole (PROTONIX) 40 MG EC tablet Take 1 tablet (40 mg) by mouth 2 times daily (before meals)     predniSONE (DELTASONE) 5 MG tablet Take 10 mg in am and 7.5 mg (1 and a half tabs) in afternoon. Then on 1/30 decrease dose to 7.5mg in the AM and 7.5mg in the PM. Further taper as outlined by transplant team.     rosuvastatin (CRESTOR) 10 MG tablet Take 1 tablet (10 mg) by mouth daily     sulfamethoxazole-trimethoprim (BACTRIM) 400-80 MG tablet Take 1 tablet by mouth daily     tacrolimus (GENERIC EQUIVALENT) 1 MG capsule Take 1 capsule (1 mg) by mouth 2 times daily Total dose 1 mg in the AM and 1 mg in the PM     traZODone (DESYREL) 50 MG tablet Take 0.5 tablets (25 mg) by mouth nightly as needed for sleep     warfarin ANTICOAGULANT (COUMADIN) 1 MG tablet Take 2 tablets (2 mg) by mouth every evening Further dosing per anticoagulation clinic (Patient taking differently: Take 0.5-1 mg by mouth every evening Further dosing per anticoagulation clinic)     No current  facility-administered medications for this visit.            Review of Systems:     As mentioned in the interim history otherwise negative by reviewing constitutional symptoms, central and peripheral neurological systems, respiratory system, cardiac system, GI system,  system, musculoskeletal, skin, allergy, and lymphatics.                  Physical Exam:   There were no vitals filed for this visit.   No vitals are available during this virtual visit.   The patient did not look short of breath or distressed during the video visit.   Constitutional: awake, alert, cooperative, no apparent distress and appears at stated age, well nourished.            Laboratory Data:     No results found for: ACD4    Inflammatory Markers    Recent Labs   Lab Test 10/29/21  0542 10/14/21  0943 10/12/21  1038 10/10/21  1024 10/08/21  0947 10/06/21  1007 10/04/21  0432 10/02/21  0528   SED 87*  --   --   --   --   --   --   --    CRP 53.0* 23.0* 17.0* 30.0* 34.0* 35.0* 23.0* 28.0*       Immune Globulin Studies      Recent Labs   Lab Test 01/03/22  0743 12/30/21  0730 12/15/21  0647 11/17/21  0337 10/15/21  0907 09/07/21  1324 09/07/21  1100   * 446* 365* 198* 265* 363*  363*  --    IGM  --   --   --   --   --  51  --    IGE  --   --   --   --   --   --  83   IGA  --   --   --   --   --  103  --        Metabolic Studies    Recent Labs   Lab Test 01/17/22  0853 01/10/22  0919 01/07/22  0836 01/05/22  1030 01/03/22  0743 12/30/21  0811 12/30/21  0730 12/16/21  1912 12/16/21  1757 12/02/21  2030 12/02/21  1816 11/15/21  0749 11/15/21  0344 10/22/21  1602 10/22/21  1601 10/22/21  0959 10/22/21  0958 09/07/21  2057 09/07/21  1324    142 142 140 141  --  139   < >  --    < >  --    < > 140  140   < > 147*  --  147*   < > 138   POTASSIUM 4.3 3.7 3.8 3.9 4.4  --  4.6   < >  --    < >  --    < > 4.6  4.6   < > 3.6  --  3.5   < > 3.9   CHLORIDE 107 106 104 104 105  --  105   < >  --    < >  --    < > 106  106   < > 109  --  106    < > 107   CO2 26 24 25 27 22  --  27   < >  --    < >  --    < > 31  31   < > 31  --  34*   < > 25   ANIONGAP 10 12 13 9 14  --  7   < >  --    < >  --    < > 3  3   < > 7  --  7   < > 6   BUN 16 23 30 27 39*  --  24   < >  --    < >  --    < > 37*  37*   < > 68*  --  62*   < > 23   CR 1.05 1.19 1.67* 1.45* 1.79*  --  1.00   < >  --    < >  --    < > 0.47*  0.47*   < > 1.47*  --  1.54*   < > 0.69   GFRESTIMATED 83 72 48* 57* 44*  --  88   < >  --    < >  --    < > >90  >90   < > 53*  --  50*   < > >90   GLC 98 94 84 86 114* 100* 100*   < >  --    < >  --    < > 191*  191*   < > 148*   < > 92   < > 151*   A1C  --   --   --   --   --   --   --   --   --   --   --   --  5.3  --   --   --   --   --   --    ERIK 9.9 9.4 9.9 9.6 10.2*  --  9.7   < >  --    < >  --    < > 8.7  8.7   < > 8.2*  --  7.9*   < > 8.8   PHOS 2.4*  --   --  2.4*  --   --   --   --   --    < >  --    < > 3.6   < > 2.8  --   --    < >  --    MAG 1.7 1.5* 1.6 1.4* 1.4*  --  1.5*   < >  --    < >  --    < > 1.5*   < > 2.1  --   --    < >  --    LACT  --   --   --   --   --   --   --   --  1.6  --  1.7   < >  --    < >  --    < >  --    < >  --    CKT  --   --   --   --   --   --   --   --   --   --   --   --   --   --  51  --  55  --  41    < > = values in this interval not displayed.       Hepatic Studies    Recent Labs   Lab Test 12/23/21  0714 12/12/21  0519 12/10/21  0647 12/08/21  0604 12/07/21  0605 12/06/21  0554 09/30/21  1059 09/19/21  1629   BILITOTAL 0.2 0.2 0.5 0.2 <0.1* 0.2   < >  --    ALKPHOS 91 104 105 107 106 114   < >  --    PROTTOTAL 6.0* 5.8* 5.9* 5.7* 5.8* 6.1*   < >  --    ALBUMIN 2.8* 2.5* 2.5* 2.3* 2.3* 2.4*   < >  --    AST 22 17 19 17 22 26   < >  --    ALT 31 26 24 25 27 32   < >  --    LDH  --   --   --   --   --   --   --  423*    < > = values in this interval not displayed.       Hematology Studies     Recent Labs   Lab Test 01/17/22  0853 01/10/22  0919 01/07/22  0836 01/05/22  1030 01/03/22  0743 12/30/21  0730  11/02/21  1302 11/02/21  1053 11/02/21  0832 11/02/21  0815 11/02/21  0556 11/01/21  0818 10/25/21  0326 10/24/21  0410 10/23/21  0344   WBC 11.2* 10.7 13.5* 11.4* 14.2* 11.1*   < > 57.4*  57.4*   < > 37.5* 32.6* 28.4*   < > 21.4* 21.3*   ANEU  --   --   --   --   --   --   --  49.9*  --  28.5* 29.7* 25.3*  --  19.9* 20.0*   ALYM  --   --   --   --   --   --   --  4.0  --  4.9 1.3 1.1  --  0.4* 0.2*   DONALD  --   --   --   --   --   --   --  1.7*  --  2.3* 0.7 1.1  --  0.9 1.1   AEOS  --   --   --   --   --   --   --  0.0  --  0.0 0.0 0.0  --  0.0 0.0   HGB 13.0* 12.9* 12.8* 12.4* 14.1 11.5*   < > 8.8*   < > 5.8* 8.2* 9.6*   < > 9.8* 9.6*   HCT 40.8 40.6 39.4* 38.6* 43.4 36.4*   < > 26.8*   < > 19.6* 27.3* 31.4*   < > 30.5* 30.2*    168 185 197 237 197   < > 244   < > 382 355 308   < > 201 132*    < > = values in this interval not displayed.       Clotting Studies    Recent Labs   Lab Test 01/17/22  0853 01/14/22  0944 01/12/22  1009 01/12/22  1008 11/03/21  0407 11/02/21  1053 11/02/21  0927 11/02/21  0908 11/02/21  0832 10/23/21  0344 10/22/21  1407   INR 1.8* 2.5* 2.4* 2.18*   < > 1.55*  --    < > 1.38*   < > 1.28*   PTT  --   --   --   --   --  34 32  --  50*  --  43*    < > = values in this interval not displayed.       Urine Studies    Recent Labs   Lab Test 11/01/21  1336 10/25/21  1507 10/22/21  1641 10/17/21  1642 10/17/21  1041   URINEPH 5.5 5.5 7.5* 5.0 5.0   NITRITE Negative Negative Negative Negative Negative   LEUKEST Negative Negative Negative Negative Trace*   WBCU 0 2 3 25* 44*         Microbiology:  Last 6 Culture results with specimen source  No results found for: CULT No results found for: SDES     Last check of C difficile  C Difficile Toxin B by PCR   Date Value Ref Range Status   11/20/2021 Negative Negative Final     Comment:     A negative result does not exclude actual disease due to C. difficile and may be due to improper collection, handling and storage of the specimen or the  number of organisms in the specimen is below the detection limit of the assay.         Virology:  CMV viral loads    CMV viral loads    CMV DNA IU/mL   Date Value Ref Range Status   01/17/2022 Not Detected Not Detected IU/mL Final   01/17/2022 Not Detected Not Detected IU/mL Final     CMV viral loads    Recent Labs   Lab Test 01/17/22  0853   CMVQNT Not Detected  Not Detected       CMV viral loads    CMV DNA IU/mL   Date Value Ref Range Status   01/17/2022 Not Detected Not Detected IU/mL Final   01/17/2022 Not Detected Not Detected IU/mL Final   01/10/2022 Not Detected Not Detected IU/mL Final   01/07/2022 Not Detected Not Detected IU/mL Final   01/07/2022 Not Detected Not Detected IU/mL Final   12/15/2021 Not Detected Not Detected IU/mL Final   11/22/2021 Not Detected Not Detected IU/mL Final   11/16/2021 Not Detected Not Detected IU/mL Final   11/12/2021 Not Detected Not Detected IU/mL Final       CMV resistance testing  No lab results found.  No results found for: CMVCID, CMVFOS, CMVGAN     EBV viral loads     Recent Labs   Lab Test 01/07/22  0836 12/15/21  0647 11/16/21  0357   EBRES Not Detected Not Detected Not Detected     EBV DNA Copies/mL   Date Value Ref Range Status   01/07/2022 Not Detected Not Detected copies/mL Final   12/15/2021 Not Detected Not Detected copies/mL Final   11/16/2021 Not Detected Not Detected copies/mL Final       Human Herpes Virus 6 viral loads    No results found for: H6RES No results found for: H6SPEC    CMV Antibody IgG   Date Value Ref Range Status   10/15/2021 No detectable antibody. No detectable antibody.  Final   09/07/2021 No detectable antibody. No detectable antibody.  Final     Toxoplasma Antibody IgG   Date Value Ref Range Status   09/07/2021 <3.0 0.0 - 7.1 IU/mL Final     Comment:     Negative- Absence of detectable Toxoplasma gondii IgG antibodies. A negative result does not rule out acute infection.       Pathology:  Native explanted lungs 10/16/2021  A.  LUNG, LEFT  NATIVE, PNEUMONECTOMY:  -Nonspecific interstitial pneumonitis (NSIP), predominant fibrotic pattern, with superimposed organizing pneumonia, reactive pneumocyte type II hyperplasia, intra-alveolar clusters of pigmented macrophages, and areas of end-stage fibrosis.  -Three benign hilar lymph nodes.  B.  LUNG, RIGHT NATIVE, PNEUMONECTOMY:  -Nonspecific interstitial pneumonitis (NSIP), predominant fibrotic pattern, with superimposed organizing pneumonia, reactive pneumocyte type II hyperplasia, intra-alveolar clusters of pigmented macrophages, and areas of end-stage fibrosis.  -Four benign hilar lymph nodes.  C.  Lymph node, level 5, excision:  -Fragments of lymph node, negative for malignancy.      Imaging:  Imaging:  CT chest 12/20/2021 reviewed by myself.   IMPRESSION:   1. Overall decreased (since 11/22/2021 chest CT) size and number of  bilateral infectious/inflammatory groundglass opacities.  2. Decreased but persistent left lower lobe cavitary lesion.  3. Decreased bilateral pleural loculated effusions.  CT chest 11/22/21   IMPRESSION:   1. New cavitary lesion in the left lung base with multifocal  groundglass opacities some of which are new compared to CT 11/2/2021,  notably in the bilateral upper lobes concerning for ongoing infection.  2. Decreased bilateral loculated pleural effusions.  3. New 1.8 cm fluid collection with surrounding fat stranding in the  left axilla.  4. Similar small pericardial effusion.

## 2022-01-19 ENCOUNTER — HOME INFUSION (PRE-WILLOW HOME INFUSION) (OUTPATIENT)
Dept: PHARMACY | Facility: CLINIC | Age: 57
End: 2022-01-19

## 2022-01-19 ENCOUNTER — TELEPHONE (OUTPATIENT)
Dept: TRANSPLANT | Facility: CLINIC | Age: 57
End: 2022-01-19

## 2022-01-19 ENCOUNTER — ANCILLARY PROCEDURE (OUTPATIENT)
Dept: CARDIOLOGY | Facility: CLINIC | Age: 57
End: 2022-01-19
Attending: PHYSICIAN ASSISTANT
Payer: COMMERCIAL

## 2022-01-19 ENCOUNTER — ANCILLARY PROCEDURE (OUTPATIENT)
Dept: CT IMAGING | Facility: CLINIC | Age: 57
End: 2022-01-19
Attending: PHYSICIAN ASSISTANT
Payer: COMMERCIAL

## 2022-01-19 ENCOUNTER — LAB (OUTPATIENT)
Dept: LAB | Facility: CLINIC | Age: 57
End: 2022-01-19
Attending: PHYSICIAN ASSISTANT
Payer: COMMERCIAL

## 2022-01-19 ENCOUNTER — ANCILLARY PROCEDURE (OUTPATIENT)
Dept: GENERAL RADIOLOGY | Facility: CLINIC | Age: 57
End: 2022-01-19
Attending: PHYSICIAN ASSISTANT
Payer: COMMERCIAL

## 2022-01-19 ENCOUNTER — DOCUMENTATION ONLY (OUTPATIENT)
Dept: INFECTIOUS DISEASES | Facility: CLINIC | Age: 57
End: 2022-01-19

## 2022-01-19 ENCOUNTER — OFFICE VISIT (OUTPATIENT)
Dept: PULMONOLOGY | Facility: CLINIC | Age: 57
End: 2022-01-19
Attending: PHYSICIAN ASSISTANT
Payer: COMMERCIAL

## 2022-01-19 ENCOUNTER — ANTICOAGULATION THERAPY VISIT (OUTPATIENT)
Dept: ANTICOAGULATION | Facility: CLINIC | Age: 57
End: 2022-01-19

## 2022-01-19 VITALS
HEART RATE: 91 BPM | WEIGHT: 139 LBS | DIASTOLIC BLOOD PRESSURE: 86 MMHG | SYSTOLIC BLOOD PRESSURE: 124 MMHG | HEIGHT: 65 IN | OXYGEN SATURATION: 96 % | BODY MASS INDEX: 23.16 KG/M2

## 2022-01-19 DIAGNOSIS — Z94.2 LUNG REPLACED BY TRANSPLANT (H): ICD-10-CM

## 2022-01-19 DIAGNOSIS — I48.91 ATRIAL FIBRILLATION, UNSPECIFIED TYPE (H): ICD-10-CM

## 2022-01-19 DIAGNOSIS — Z94.2 S/P LUNG TRANSPLANT (H): Primary | ICD-10-CM

## 2022-01-19 DIAGNOSIS — Z00.6 EXAMINATION OF PARTICIPANT OR CONTROL IN CLINICAL RESEARCH: ICD-10-CM

## 2022-01-19 DIAGNOSIS — Z94.2 LUNG REPLACED BY TRANSPLANT (H): Primary | ICD-10-CM

## 2022-01-19 DIAGNOSIS — Z94.2 S/P LUNG TRANSPLANT (H): Chronic | ICD-10-CM

## 2022-01-19 DIAGNOSIS — B00.2 PRIMARY HSV INFECTION OF MOUTH: ICD-10-CM

## 2022-01-19 DIAGNOSIS — I48.91 ATRIAL FIBRILLATION, UNSPECIFIED TYPE (H): Primary | ICD-10-CM

## 2022-01-19 LAB
EBV DNA # SPEC NAA+PROBE: NOT DETECTED COPIES/ML
EXPTIME-PRE: 8.63 SEC
FEF2575-%PRED-PRE: 21 %
FEF2575-PRE: 0.62 L/SEC
FEF2575-PRED: 2.83 L/SEC
FEFMAX-%PRED-PRE: 48 %
FEFMAX-PRE: 4.05 L/SEC
FEFMAX-PRED: 8.37 L/SEC
FEV1-%PRED-PRE: 41 %
FEV1-PRE: 1.31 L
FEV1FEV6-PRE: 61 %
FEV1FEV6-PRED: 80 %
FEV1FVC-PRE: 59 %
FEV1FVC-PRED: 79 %
FIFMAX-PRE: 4.05 L/SEC
FVC-%PRED-PRE: 55 %
FVC-PRE: 2.21 L
FVC-PRED: 3.98 L
INR PPP: 1.58 (ref 0.85–1.15)

## 2022-01-19 PROCEDURE — G0463 HOSPITAL OUTPT CLINIC VISIT: HCPCS | Mod: 25

## 2022-01-19 PROCEDURE — 71250 CT THORAX DX C-: CPT | Mod: GC | Performed by: RADIOLOGY

## 2022-01-19 PROCEDURE — 93248 EXT ECG>7D<15D REV&INTERPJ: CPT | Performed by: INTERNAL MEDICINE

## 2022-01-19 PROCEDURE — 85610 PROTHROMBIN TIME: CPT | Performed by: PATHOLOGY

## 2022-01-19 PROCEDURE — 87516 HEPATITIS B DNA AMP PROBE: CPT | Mod: 90 | Performed by: PATHOLOGY

## 2022-01-19 PROCEDURE — 99000 SPECIMEN HANDLING OFFICE-LAB: CPT | Performed by: PATHOLOGY

## 2022-01-19 PROCEDURE — 87535 HIV-1 PROBE&REVERSE TRNSCRPJ: CPT | Mod: 90 | Performed by: PATHOLOGY

## 2022-01-19 PROCEDURE — 87521 HEPATITIS C PROBE&RVRS TRNSC: CPT | Mod: 90 | Performed by: PATHOLOGY

## 2022-01-19 PROCEDURE — 94375 RESPIRATORY FLOW VOLUME LOOP: CPT | Performed by: PHYSICIAN ASSISTANT

## 2022-01-19 PROCEDURE — 99214 OFFICE O/P EST MOD 30 MIN: CPT | Mod: 25 | Performed by: PHYSICIAN ASSISTANT

## 2022-01-19 PROCEDURE — 93246 EXT ECG>7D<15D RECORDING: CPT

## 2022-01-19 PROCEDURE — 90682 RIV4 VACC RECOMBINANT DNA IM: CPT | Performed by: PHYSICIAN ASSISTANT

## 2022-01-19 PROCEDURE — 71046 X-RAY EXAM CHEST 2 VIEWS: CPT | Performed by: STUDENT IN AN ORGANIZED HEALTH CARE EDUCATION/TRAINING PROGRAM

## 2022-01-19 PROCEDURE — 250N000011 HC RX IP 250 OP 636: Performed by: PHYSICIAN ASSISTANT

## 2022-01-19 PROCEDURE — 36415 COLL VENOUS BLD VENIPUNCTURE: CPT | Performed by: PATHOLOGY

## 2022-01-19 PROCEDURE — G0008 ADMIN INFLUENZA VIRUS VAC: HCPCS | Performed by: PHYSICIAN ASSISTANT

## 2022-01-19 RX ORDER — TACROLIMUS 0.5 MG/1
0.5 CAPSULE ORAL 2 TIMES DAILY
Qty: 60 CAPSULE | Refills: 11 | Status: SHIPPED | OUTPATIENT
Start: 2022-01-19 | End: 2022-01-28

## 2022-01-19 RX ORDER — VALACYCLOVIR HYDROCHLORIDE 1 G/1
1000 TABLET, FILM COATED ORAL 2 TIMES DAILY
Qty: 14 TABLET | Refills: 0 | Status: SHIPPED | OUTPATIENT
Start: 2022-01-19 | End: 2022-02-10

## 2022-01-19 RX ORDER — TACROLIMUS 1 MG/1
1 CAPSULE ORAL 2 TIMES DAILY
Qty: 60 CAPSULE | Refills: 11 | Status: SHIPPED | OUTPATIENT
Start: 2022-01-19 | End: 2022-01-28

## 2022-01-19 RX ADMIN — INFLUENZA A VIRUS A/WISCONSIN/588/2019 (H1N1) RECOMBINANT HEMAGGLUTININ ANTIGEN, INFLUENZA A VIRUS A/TASMANIA/503/2020 (H3N2) RECOMBINANT HEMAGGLUTININ ANTIGEN, INFLUENZA B VIRUS B/WASHINGTON/02/2019 RECOMBINANT HEMAGGLUTININ ANTIGEN, AND INFLUENZA B VIRUS B/PHUKET/3073/2013 RECOMBINANT HEMAGGLUTININ ANTIGEN 0.5 ML: 45; 45; 45; 45 INJECTION INTRAMUSCULAR at 10:37

## 2022-01-19 ASSESSMENT — PAIN SCALES - GENERAL: PAINLEVEL: NO PAIN (0)

## 2022-01-19 ASSESSMENT — MIFFLIN-ST. JEOR: SCORE: 1384.2

## 2022-01-19 NOTE — RESULT ENCOUNTER NOTE
Chest CT reviewed by ID.   Will stop IV fluconazole.   Will increase patient's tacrolimus dose to 1.5mg BID   Tacrolimus level on 1/25    Patient verbalized understanding and agreement of plan. Will call back with questions, concerns, updates.

## 2022-01-19 NOTE — PROGRESS NOTES
ANTICOAGULATION MANAGEMENT     Edson Thornton 56 year old male is on warfarin with subtherapeutic INR result. (Goal INR 2.0-3.0)    Recent labs: (last 7 days)     01/19/22  0916   INR 1.58*       ASSESSMENT     Source(s): Chart Review and Patient/Caregiver Call     Warfarin doses taken: Reviewed in chart  Diet: No new diet changes identified  New illness, injury, or hospitalization: No  Medication/supplement changes: IV diflucan stopped on 1/18.  Signs or symptoms of bleeding or clotting: No  Previous INR: Subtherapeutic  Additional findings: None     PLAN     Recommended plan for no diet, medication or health factor changes affecting INR     Dosing Instructions:  Increase your warfarin dose (23% change) with next INR in 2 days       Summary  As of 1/19/2022    Full warfarin instructions:  1 mg every Sun, Tue; 2 mg all other days   Next INR check:  1/21/2022             Detailed voice message left for Edson with dosing instructions and follow up date.     Contact [phone] to schedule and with any changes, questions or concerns.     Education provided: Please call back if any changes to your diet, medications or how you've been taking warfarin and Contact [phone] with any changes, questions or concerns.     Plan made with [facility] Pharmacist Usha Cedillo RN  Anticoagulation Clinic  1/19/2022    _______________________________________________________________________     Anticoagulation Episode Summary     Current INR goal:  2.0-3.0   TTR:  66.6 % (1.6 wk)   Target end date:  Indefinite   Send INR reminders to:  [facility]    Indications    Atrial fibrillation  unspecified type (H) [I48.91]           Comments:  [facility] P: [phone]  Transplant Labs twice a week  Staying Local: [facility] then back to [place]         Anticoagulation Care Providers     Provider Role Specialty Phone number    Abiodun Woods MD Referring Pulmonary Disease [phone]

## 2022-01-19 NOTE — PROGRESS NOTES
Per Angela Quigley, patient to have 14 day Zio patch monitor placed.  Diagnosis: Lung replaced by transplant Z94.2  Monitor placed: Yes  Patient Instructed: Yes  Patient verbalized understanding: Yes  Holter # [id number]  Instructed by FAVIO

## 2022-01-19 NOTE — NURSING NOTE
Chief Complaint   Patient presents with     Lung Transplant     1 week follow up      Vitals were taken and medications were reconciled.    Cathie Toribio RMA  9:41 AM

## 2022-01-19 NOTE — LETTER
1/19/2022         RE: Edson Thornton  [address]  [place] [address]        Dear Colleague,    Thank you for referring your patient, Edson Thornton, to the [facility]. Please see a copy of my visit note below.    [facility]  January 19, 2022         Assessment and Plan:   Edson Thornton is a 56 year old male with NSIP/ILD, bronchiectasis, moderate PH, RA, SALTY, chronic HSV infection, hypogammaglobulinemia, steroid-induced diabetes, hypothyroidism, PFO, HTN, HLD, duodenal anomaly, anxiety, and depression. Admitted on 9/5/21 from OSH for acute on chronic respiratory failure 2/2 ILD exacerbation now s/p BSLT on 10/16/21. Prolonged vent wean s/p trach and PEG/J tube.  Post-op course also complicated by encephalopathy and diffuse weakness, acute to subacute CVA, afib with RVR, BRENNAN, GI bleed, Candidemia/Candida empyema, and anxiety. Code called 11/2 for bradycardia/asystole, progressive hypotension, found to have significant GI bleed, EGD with adherent clot near PEG tube site that was clipped. Seen today for routine follow up.     1. S/p BSLT for ILD:  Bilateral pleural effusions: complicated by above. No new pulmonary complaints, continues to work with PT/OT at home. Dyspnea with exertion only, improving. Sating 96% on room air. CMV 1/17 negative. CXR reviewed today demonstrates stable effusions and LLL consolidation. PFTs are lower than expected, improved slightly from prior, likely secondary to significant weakness/deconditioning.   - CT chest today per below  - Continue MMF 1250 mg BID (AZA to be avoided given TPMT), tacrolimus (goal 8-12) and prednisone     Date AM dose (mg) PM dose (mg)   12/5/21 10 10   1/2/22 10 7.5   1/30/22 7.5 7.5   2/27/22 7.5 5   3/27/22 5 5   4/24/22 5 2.5      Prophylaxis:   - Bactrim for PJP ppx, monitor given BRENNAN  - Nystatin fX 6 months  - Check EBV  every 3 months, last negative on 1/7      Donor Recipient Intervention   CMV status Negative Negative CMV monthly   EBV status Positive Positive EBV every 3 months   HSV status N/A Positive S/p acyclovir POD #1-30 (recent infection history pre-txp)      2. Positive cross match:   Positive DSA: received two doses of rituximab in June, which is likely contributing to cross match result. DSA newly positive 11/29, most recently with DQB5 down to 1849 (from 4032).   - Monitor in two weeks     3. Klebsiella pneumoniae:  Pseudomonas fluorescens/putida:   LLL cavity: completed course of levaquin yesterday (see prior notes for course).  - CT chest today      4. Disseminated Candida: on blood cultures 10/20 and 10/22. Respiratory cultures with persistent Candida albicans. TC 10/23 without evidence of endocarditis.  Ophthalmology consult 10/24 with benign dilated fundoscopic exam.  Candida empyema also noted 10/25, chest tubes inadvertently removed by CVTS 10/28, left chest tube replaced by IR 11/3 as above with ongoing Candida on cultures (11/3, 11/18). Following with Transplant ID.  - Continue IV fluconazole   - CT chest today to determine treatment course     5. Recurrent HSV: chronic intermittent active infection pre-transplant with HSV infection s/p 10 day treatment course of ACV through 10/9. Now iwith new lesions just inferior to right lower lip and small lesions on jaw  - Restart valacyclovir today, reassess at visit next week for extended course  - Likely to need suppressive antiviral     6. Hypogammaglobulinemia: s/p IVIG 10/21, repeat IgG  on11/17 still low at 198, s/p IVIG on 11/18. Last IgG of 502 on 1/3.  - Recommend recheck in one month, 2/3     7. SALTY: noted pre-transplant. Supposed to be on home CPAP, but has not been using it.  - Resume CPAP, will need to f/u with Sleep Medicine at home with repeat sleep study    8. Hypomagnesemia: secondary to CNI. Mg 1.7 today.   - Continue Mg oxide 800 mg BID    9. HTN:  BP well controlled today.   - Continue amlodipine    10. Depression/anxiety: mood is stable. Follows with his PCP.   - Continue escitalopram    11. Steroid induced hyperglycemia: last AIC of 5.3 on 11/15/21. BS have ranged from low 100s to 150s.   - On Lantus 3 U daily, plan to discontinue in the next few weeks    12. Multifocal acute to subacute ischemic stroke: etiology likely embolic vs perioperative. MRI brain 10/23 with infarcts noted in both cerebral hemispheres, left cerebellum, presumed associated with new Afib vs perioperative. Bilateral upper extremity paresis (R>L), suspicion for some cortical blindness. SBP goal < 140.   - Continue warfarin, HTN and BS control and rosuvastatin  - Needs to schedule f/u with Neurology    13. Afib with RVR: noted 10/18, started on amiodarone drip and converted to SR, transitioned to PO 10/21, decreased 10/29. Metoprolol and amiodarone discontinued 11/19 d/t intermittent bradycardia.   - Zio patch today; will schedule f/u with EP   - On warfarin    14. Rheumatoid arthritis: previously treated with rituximab. Rheumatology familiar and consulted early in admission. Will need follow up.      15. Severe malnutrition: PEG/J tube removed 12/24 at bedside with improved PO intake. Weight has been declining, down to 139 lbs today.   -Continue multivitamin, folate, B6, B12 supplements  - May need dietician to f/u with patient     16. H/o GIB: recent EGD 11/5 with ulcer noted near the PEG bumper site, gastritis, suction marks from the G-tube. GJ tube was exchanged 11/9 by GI, removed on 12/24.   -Continue PPI BID    RTC: next week prior to bronch  Influenza and other vaccinations: Evusheld completed on 1/7; flu vaccine today; will need covid vaccine in the next 1-2 weeks  Annual dermatology visit: did not discuss    Angela Luna PA-C  Pulmonary, Allergy, Critical Care and Sleep Medicine        Interval History:     Continues with home PT/OT, walking the halls. Feels like he is getting  stronger. Does have some shortness of breath with walking and therapy, but feels like it's getting better. No fever or chills, does have a morning cough, not productive and stale. No chest pain or palpitations. No nausea or vomiting, stools are soft, no diarrhea. Drinking about 70 oz per day.          Review of Systems:   Please see HPI, otherwise the complete 10 point ROS is negative.           Past Medical and Surgical History:     Past Medical History:   Diagnosis Date     BRENNAN (acute kidney injury) (H) 10/17/2021     Anxiety      Depression      HLD (hyperlipidemia)      HTN (hypertension)      Hypothyroidism      ILD (interstitial lung disease) (H)      SALTY on CPAP      Oxygen dependent     BL 4L since ~6/2021     Rheumatoid arthritis (H)     signs ~5/2020, dx 5/2021     S/P lung transplant (H) 10/16/2021     Shock liver 10/17/2021     Steroid-induced hyperglycemia      Traction bronchiectasis (H)      Past Surgical History:   Procedure Laterality Date     COLONOSCOPY W/ BIOPSIES AND POLYPECTOMY  07/21/2020     CV CORONARY ANGIOGRAM N/A 09/08/2021    Procedure: Coronary Angiogram with possible intervention;  Surgeon: Jovon Bullock MD;  Location:  HEART CARDIAC CATH LAB     CV RIGHT HEART CATH MEASUREMENTS RECORDED N/A 09/08/2021    Procedure: Right Heart Cath;  Surgeon: Jovon Bullock MD;  Location: [facility] CARDIAC CATH LAB     ESOPHAGOSCOPY, GASTROSCOPY, DUODENOSCOPY (EGD), COMBINED N/A 10/23/2021    Procedure: ESOPHAGOGASTRODUODENOSCOPY (EGD);  Surgeon: Miquel Pisano MD;  Location:  GI     ESOPHAGOSCOPY, GASTROSCOPY, DUODENOSCOPY (EGD), COMBINED N/A 11/02/2021    Procedure: ESOPHAGOGASTRODUODENOSCOPY (EGD);  Surgeon: Daniel Ortiz MD;  Location:  GI     ESOPHAGOSCOPY, GASTROSCOPY, DUODENOSCOPY (EGD), COMBINED N/A 11/5/2021    Procedure: ESOPHAGOGASTRODUODENOSCOPY (EGD);  Surgeon: Ronnell Hernandez MD;  Location:  GI     EXTRACTION(S) DENTAL N/A 09/22/2021     Procedure: EXTRACTION tooth #19;  Surgeon: Deepak Tobin DDS;  Location: [facility] OR     HERNIA REPAIR       IR CHEST TUBE PLACEMENT NON-TUNNELLED LEFT  11/03/2021     PICC TRIPLE LUMEN PLACEMENT Left 11/04/2021    5FR TL PICC. Right non occlusive thrombus subclavian vein.     REPLACE GASTROJEJUNOSTOMY TUBE, PERCUTANEOUS N/A 11/9/2021    Procedure: REPLACEMENT, GASTROJEJUNOSTOMY TUBE, PERCUTANEOUS;  Surgeon: Hernando Rodriguez MD;  Location: [facility] GI     right acl       TRACHEOSTOMY PERCUTANEOUS N/A 10/29/2021    Procedure: Percutaneous Tracheostomy,;  Surgeon: Celine Jenkins MD;  Location: [facility] OR     TRANSPLANT LUNG RECIPIENT SINGLE X2 Bilateral 10/16/2021    Procedure: TRANSPLANT, LUNG, RECIPIENT, BILATERAL, Bronchoscopy, on-pump perfusion, bilateral clamshell sternotomy;  Surgeon: Yainck Corral MD;  Location: [facility] OR     XR ACROMIOCLAVICULAR JOINT BILATERAL             Family History:     Family History   Problem Relation Age of Onset     Diabetes Type 1 Mother      Heart Disease Mother      Chronic Obstructive Pulmonary Disease Mother      Rheumatoid Arthritis Father      Emphysema Paternal Grandfather             Social History:     Social History     Socioeconomic History     Marital status: Single     Spouse name: Not on file     Number of children: Not on file     Years of education: Not on file     Highest education level: Not on file   Occupational History     Not on file   Tobacco Use     Smoking status: Never Smoker     Smokeless tobacco: Never Used   Substance and Sexual Activity     Alcohol use: Never     Drug use: Never     Sexual activity: Not on file   Other Topics Concern     Parent/sibling w/ CABG, MI or angioplasty before 65F 55M? Not Asked   Social History Narrative     Not on file     Social Determinants of Health     Financial Resource Strain: Not on file   Food Insecurity: Not on file   Transportation Needs: Not on file   Physical Activity: Not on file   Stress: Not on file   Social  "Connections: Not on file   Intimate Partner Violence: Not on file   Housing Stability: Not on file            Medications:     Current Outpatient Medications   Medication     valACYclovir (VALTREX) 1000 mg tablet     acetaminophen (TYLENOL) 325 MG tablet     amLODIPine (NORVASC) 5 MG tablet     calcium carbonate 600 mg-vitamin D 400 units (CALTRATE) 600-400 MG-UNIT per tablet     escitalopram (LEXAPRO) 20 MG tablet     fluticasone (FLONASE) 50 MCG/ACT nasal spray     folic acid-vit B6-vit B12 (FOLGARD) 0.8-10-0.115 MG TABS per tablet     insulin glargine (LANTUS PEN) 100 UNIT/ML pen     levalbuterol (XOPENEX HFA) 45 MCG/ACT inhaler     levothyroxine (SYNTHROID/LEVOTHROID) 25 MCG tablet     magnesium oxide (MAG-OX) 400 MG tablet     Melatonin 10 MG TABS tablet     multivitamin w/minerals (THERA-VIT-M) tablet     mycophenolate (GENERIC EQUIVALENT) 250 MG capsule     nystatin (MYCOSTATIN) 314744 UNIT/ML suspension     ondansetron (ZOFRAN-ODT) 4 MG ODT tab     pantoprazole (PROTONIX) 40 MG EC tablet     predniSONE (DELTASONE) 5 MG tablet     rosuvastatin (CRESTOR) 10 MG tablet     sulfamethoxazole-trimethoprim (BACTRIM) 400-80 MG tablet     tacrolimus (GENERIC EQUIVALENT) 1 MG capsule     warfarin ANTICOAGULANT (COUMADIN) 1 MG tablet     Current Facility-Administered Medications   Medication     influenza recomb quadrivalent PF (FLUBLOK) injection 0.5 mL            Physical Exam:   /86 (BP Location: Right arm, Patient Position: Chair, Cuff Size: Adult Regular)   Pulse 91   Ht 1.646 m (5' 4.8\")   Wt 63 kg (139 lb)   SpO2 96%   BMI 23.27 kg/m      GENERAL: alert, NAD  HEENT: NCAT, EOMI, no scleral icterus, oral mucosa moist and without lesions  Neck: no cervical or supraclavicular adenopathy  Lungs: moderate airflow, decreased in bases  CV: RRR, S1S2, no murmurs noted  Abdomen: normoactive BS, soft  Lymph: no edema  Neuro: AAO X 3, CN 2-12 grossly intact  Psychiatric: normal affect, good eye contact  Skin: " maculopapular lesion below right lip with few smaller scattered lesions on chin         Data:   All laboratory and imaging data reviewed.      Recent Results (from the past 168 hour(s))   Tacrolimus by Tandem Mass Spectrometry    Collection Time: 01/13/22 12:05 PM   Result Value Ref Range    Tacrolimus by Tandem Mass Spectrometry 23.3 (HH) 5.0 - 15.0 ug/L    Tacrolimus Last Dose Date      Tacrolimus Last Dose Time     Extra Red Top Tube    Collection Time: 01/13/22 12:05 PM   Result Value Ref Range    Hold Specimen JIC    Extra Green Top (Lithium Heparin) Tube    Collection Time: 01/13/22 12:05 PM   Result Value Ref Range    Hold Specimen JIC    INR point of care    Collection Time: 01/14/22  9:44 AM   Result Value Ref Range    INR 2.5 (H) 0.9 - 1.1   Basic metabolic panel    Collection Time: 01/17/22  8:53 AM   Result Value Ref Range    Sodium 143 133 - 144 mmol/L    Potassium 4.3 3.4 - 5.3 mmol/L    Chloride 107 94 - 109 mmol/L    Carbon Dioxide (CO2) 26 20 - 32 mmol/L    Anion Gap 10 3 - 14 mmol/L    Urea Nitrogen 16 7 - 30 mg/dL    Creatinine 1.05 0.66 - 1.25 mg/dL    Calcium 9.9 8.5 - 10.1 mg/dL    Glucose 98 70 - 99 mg/dL    GFR Estimate 83 >60 mL/min/1.73m2   Magnesium    Collection Time: 01/17/22  8:53 AM   Result Value Ref Range    Magnesium 1.7 1.6 - 2.3 mg/dL   CBC with platelets    Collection Time: 01/17/22  8:53 AM   Result Value Ref Range    WBC Count 11.2 (H) 4.0 - 11.0 10e3/uL    RBC Count 4.81 4.40 - 5.90 10e6/uL    Hemoglobin 13.0 (L) 13.3 - 17.7 g/dL    Hematocrit 40.8 40.0 - 53.0 %    MCV 85 78 - 100 fL    MCH 27.0 26.5 - 33.0 pg    MCHC 31.9 31.5 - 36.5 g/dL    RDW 15.1 (H) 10.0 - 15.0 %    Platelet Count 164 150 - 450 10e3/uL   CMV DNA quantification    Collection Time: 01/17/22  8:53 AM    Specimen: Arm, Right; Blood   Result Value Ref Range    CMV DNA IU/mL Not Detected Not Detected IU/mL   Tacrolimus level    Collection Time: 01/17/22  8:53 AM   Result Value Ref Range    Tacrolimus by Tandem  Mass Spectrometry 8.8 5.0 - 15.0 ug/L    Tacrolimus Last Dose Date      Tacrolimus Last Dose Time     INR point of care    Collection Time: 01/17/22  8:53 AM   Result Value Ref Range    INR 1.8 (H) 0.9 - 1.1   CMV DNA quantification    Collection Time: 01/17/22  8:53 AM    Specimen: Arm, Right; Blood   Result Value Ref Range    CMV DNA IU/mL Not Detected Not Detected IU/mL   Phosphorus    Collection Time: 01/17/22  8:53 AM   Result Value Ref Range    Phosphorus 2.4 (L) 2.5 - 4.5 mg/dL   HLA Donor Specific Antibody    Collection Time: 01/17/22  8:53 AM   Result Value Ref Range    Donor Identification 10/16/2021     Organ Lung     DSA Present YES     DQB5 1849     DSA Comments        Flow Single Antigen Beads assays are intended for detection/identification of IgG anti-HLA antibodies. Mfi values may not accurately quantify donor-specific antibody levels in all instances.    DSA Test Method SA FCS    HLA Tal Class I, Single Antigen    Collection Time: 01/17/22  8:53 AM   Result Value Ref Range    SA 1 TEST METHOD  FCS     SA 1 CELL Class I     SA1 HI RISK TAL None     SA1 MOD RISK TAL B:7     SA 1  COMMENTS        Test performed by modified procedure. Serum heat inactivated and tested by a modified (Bend) protocol including fetal calf serum addition. High-risk, mfi >3,000. Mod-risk, mfi 500-3,000.   HLA Tal, CPRA    Collection Time: 01/17/22  8:53 AM   Result Value Ref Range    UNOS CPRA 26     UNACCEPTABLE ANTIGENS DQ:5       HLA Tal Class II, Single Antigen    Collection Time: 01/17/22  8:53 AM   Result Value Ref Range    SA 2 TEST METHOD SA FCS     SA 2 CELL Class II     SA2 HI RISK TAL None     SA2 MOD RISK TAL DQ:5     SA 2 COMMENTS        Test performed by modified procedure. Serum heat inactivated and tested by a modified (Bend) protocol including fetal calf serum addition. High-risk, mfi >3,000. Mod-risk, mfi 500-3,000.   INR    Collection Time: 01/19/22  9:16 AM   Result Value Ref Range    INR 1.58 (H)  0.85 - 1.15   General PFT Lab (Please always keep checked)    Collection Time: 01/19/22  9:25 AM   Result Value Ref Range    FVC-Pred 3.98 L    FVC-Pre 2.21 L    FVC-%Pred-Pre 55 %    FEV1-Pre 1.31 L    FEV1-%Pred-Pre 41 %    FEV1FVC-Pred 79 %    FEV1FVC-Pre 59 %    FEFMax-Pred 8.37 L/sec    FEFMax-Pre 4.05 L/sec    FEFMax-%Pred-Pre 48 %    FEF2575-Pred 2.83 L/sec    FEF2575-Pre 0.62 L/sec    MPO5396-%Pred-Pre 21 %    ExpTime-Pre 8.63 sec    FIFMax-Pre 4.05 L/sec    FEV1FEV6-Pred 80 %    FEV1FEV6-Pre 61 %     PFT interpretation:  Maneuver: valid and meets ATS guidelines  Severe obstruction with decreased FEV1/FVC and FEV1  Compared to prior: FEV1 of 1.31 is 70 ml above prior  The decrease in FVC may represent air trapping v. restrictive physiology.  Lung volumes would be necessary to determine.    Transplant Coordinator Note    Reason for visit: Post lung transplant follow up visit   Coordinator: Present   Caregiver:      Health concerns addressed today:  1. Respiratory - getting short of breath with activity - getting better. Cough in AM - same as before  2. GI: nausea/emesis resolved. No abdominal pain. Soft stools,   3. ENT: no issues, at baseline.      Activity/rehab: home OT/PT  Oxygen needs: room air  Pain management/RX: denies  Diabetic management: managed by endocrine  Next Bronch due:   CMV status: D-/R-  EBV status: D+/R+  PJP prophylactic: Bactrim    COVID:  1. COVID-19 infection (yes/no, date of most recent positive test):   2. Status/instructions given about COVID-19 vaccine: influenza vaccine    Pt Education: medications (use/dose/side effects), how/when to call coordinator, frequency of labs, s/s of infection/rejection, call prior to starting any new medications, lab/vital sign book    Health Maintenance:     Last colonoscopy:     Next colonoscopy due:     Dermatology:    Vaccinations this visit:     Labs, CXR, PFTs reviewed with patient  Medication record reviewed and reconciled  Questions and  concerns addressed    Patient Instructions  1. Continue to hydrate with 60-70 oz fluids daily.  2. Continue to exercise daily or most days of the week.  3. Follow up with your primary care provider for annual gender health maintenance procedures.  4. Follow up with colonoscopy schedule.  5. Follow up with annual dermatology visits.  6. It doesn't seem like the COVID vaccine is working well in lung transplant patients. A number of lung transplant patients have gotten sick with COVID even after receiving the vaccines.  Based on our recent experience, it can be life-threatening to get COVID  even after being vaccinated. Please continue to act like you did not get the COVID vaccine - social distancing, wearing a mask, good hand hygiene, etc. If the people around you are vaccinated, it will help reduce the risk of you getting COVID. All members of your household should be vaccinated.  7. Chest CT today after clinic.   8. You are done with Levaquin after today.   9. We are going to give you 7 days of Valtrex 1000mg twice a day.    Next transplant clinic appointment: 1 week with CXR, labs and PFTs  Next lab draw: Weekly      AVS printed at time of check out            Again, thank you for allowing me to participate in the care of your patient.        Sincerely,        Angela Luna PA-C

## 2022-01-19 NOTE — TELEPHONE ENCOUNTER
Chest CT reviewed by ID. Patient discontinuing IV fluconazole after today.   Tacrolimus dose increased to 1.5mg BID.   Level recheck 1/25.     Patient verbalized understanding and agreement of plan. Will call with questions, concerns, updates.

## 2022-01-19 NOTE — PROGRESS NOTES
The CT chest continues to show improvement of the cavitary lesion and the pleural effusion.   Will discontinue fluconazole and levofloxacin. The PICC line can be discontinued from the ID stand of point.   This was communicated with pulmonary, [facility] and the patient.     Ming Abebe MD

## 2022-01-19 NOTE — PATIENT INSTRUCTIONS
Patient Instructions  1. Continue to hydrate with 60-70 oz fluids daily.  2. Continue to exercise daily or most days of the week.  3. Follow up with your primary care provider for annual gender health maintenance procedures.  4. Follow up with colonoscopy schedule.  5. Follow up with annual dermatology visits.  6. It doesn't seem like the COVID vaccine is working well in lung transplant patients. A number of lung transplant patients have gotten sick with COVID even after receiving the vaccines.  Based on our recent experience, it can be life-threatening to get COVID  even after being vaccinated. Please continue to act like you did not get the COVID vaccine - social distancing, wearing a mask, good hand hygiene, etc. If the people around you are vaccinated, it will help reduce the risk of you getting COVID. All members of your household should be vaccinated.  7. Chest CT today after clinic.   8. You are done with Levaquin after today.   9. We are going to give you 7 days of Valtrex 1000mg twice a day.    Next transplant clinic appointment: 1 week with CXR, labs and PFTs  Next lab draw: Weekly      ~~~~~~~~~~~~~~~~~~~~~~~~~    Thoracic Transplant Office phone [phone], fax [phone]    Office Hours 8:30 - 5:00     For after-hours urgent issues, please dial [phone], and ask to speak with the Thoracic Transplant Coordinator On-Call.  --------------------  To expedite your medication refill(s), please contact your pharmacy and have them fax a refill request to: [phone]  .   *Please allow 3 business days for routine medication refills.  *Please allow 5 business days for controlled substance medication refills.    **For Diabetic medications and supplies refill(s), please contact your pharmacy and have them contact your Endocrine team.  --------------------  For scheduling appointments call [phone].  --------------------  Please Note: If you are active on [employer], all future test results will be sent  by [employer] message only, and will no longer be called to patient. You may also receive communication directly from your physician.

## 2022-01-19 NOTE — PROGRESS NOTES
Transplant Coordinator Note    Reason for visit: Post lung transplant follow up visit   Coordinator: Present   Caregiver:      Health concerns addressed today:  1. Respiratory - getting short of breath with activity - getting better. Cough in AM - same as before  2. GI: nausea/emesis resolved. No abdominal pain. Soft stools,   3. ENT: no issues, at baseline.      Activity/rehab: home OT/PT  Oxygen needs: room air  Pain management/RX: denies  Diabetic management: managed by endocrine  Next Bronch due:   CMV status: D-/R-  EBV status: D+/R+  PJP prophylactic: Bactrim    COVID:  1. COVID-19 infection (yes/no, date of most recent positive test):   2. Status/instructions given about COVID-19 vaccine: influenza vaccine    Pt Education: medications (use/dose/side effects), how/when to call coordinator, frequency of labs, s/s of infection/rejection, call prior to starting any new medications, lab/vital sign book    Health Maintenance:     Last colonoscopy:     Next colonoscopy due:     Dermatology:    Vaccinations this visit:     Labs, CXR, PFTs reviewed with patient  Medication record reviewed and reconciled  Questions and concerns addressed    Patient Instructions  1. Continue to hydrate with 60-70 oz fluids daily.  2. Continue to exercise daily or most days of the week.  3. Follow up with your primary care provider for annual gender health maintenance procedures.  4. Follow up with colonoscopy schedule.  5. Follow up with annual dermatology visits.  6. It doesn't seem like the COVID vaccine is working well in lung transplant patients. A number of lung transplant patients have gotten sick with COVID even after receiving the vaccines.  Based on our recent experience, it can be life-threatening to get COVID  even after being vaccinated. Please continue to act like you did not get the COVID vaccine - social distancing, wearing a mask, good hand hygiene, etc. If the people around you are vaccinated, it will help reduce the  risk of you getting COVID. All members of your household should be vaccinated.  7. Chest CT today after clinic.   8. You are done with Levaquin after today.   9. We are going to give you 7 days of Valtrex 1000mg twice a day.    Next transplant clinic appointment: 1 week with CXR, labs and PFTs  Next lab draw: Weekly      AVS printed at time of check out

## 2022-01-19 NOTE — LETTER
Date:January 19, 2022      Patient was self referred, no letter generated. Do not send.        [facility] Information

## 2022-01-20 ENCOUNTER — TELEPHONE (OUTPATIENT)
Dept: TRANSPLANT | Facility: CLINIC | Age: 57
End: 2022-01-20
Payer: COMMERCIAL

## 2022-01-20 ENCOUNTER — LAB REQUISITION (OUTPATIENT)
Dept: LAB | Facility: CLINIC | Age: 57
End: 2022-01-20
Payer: COMMERCIAL

## 2022-01-20 ENCOUNTER — TELEPHONE (OUTPATIENT)
Dept: ANTICOAGULATION | Facility: CLINIC | Age: 57
End: 2022-01-20

## 2022-01-20 DIAGNOSIS — E43 UNSPECIFIED SEVERE PROTEIN-CALORIE MALNUTRITION (H): ICD-10-CM

## 2022-01-20 LAB
ANION GAP SERPL CALCULATED.3IONS-SCNC: 4 MMOL/L (ref 3–14)
BASOPHILS # BLD AUTO: 0 10E3/UL (ref 0–0.2)
BASOPHILS NFR BLD AUTO: 0 %
BUN SERPL-MCNC: 22 MG/DL (ref 7–30)
CALCIUM SERPL-MCNC: 9 MG/DL (ref 8.5–10.1)
CHLORIDE BLD-SCNC: 111 MMOL/L (ref 94–109)
CO2 SERPL-SCNC: 28 MMOL/L (ref 20–32)
CREAT SERPL-MCNC: 0.84 MG/DL (ref 0.66–1.25)
EOSINOPHIL # BLD AUTO: 0.1 10E3/UL (ref 0–0.7)
EOSINOPHIL NFR BLD AUTO: 1 %
ERYTHROCYTE [DISTWIDTH] IN BLOOD BY AUTOMATED COUNT: 15.4 % (ref 10–15)
GFR SERPL CREATININE-BSD FRML MDRD: >90 ML/MIN/1.73M2
GLUCOSE BLD-MCNC: 113 MG/DL (ref 70–99)
HCT VFR BLD AUTO: 36.3 % (ref 40–53)
HGB BLD-MCNC: 11.8 G/DL (ref 13.3–17.7)
IMM GRANULOCYTES # BLD: 0.1 10E3/UL
IMM GRANULOCYTES NFR BLD: 1 %
LYMPHOCYTES # BLD AUTO: 0.9 10E3/UL (ref 0.8–5.3)
LYMPHOCYTES NFR BLD AUTO: 9 %
MAGNESIUM SERPL-MCNC: 1.4 MG/DL (ref 1.6–2.3)
MCH RBC QN AUTO: 27.1 PG (ref 26.5–33)
MCHC RBC AUTO-ENTMCNC: 32.5 G/DL (ref 31.5–36.5)
MCV RBC AUTO: 83 FL (ref 78–100)
MONOCYTES # BLD AUTO: 0.8 10E3/UL (ref 0–1.3)
MONOCYTES NFR BLD AUTO: 8 %
NEUTROPHILS # BLD AUTO: 8.4 10E3/UL (ref 1.6–8.3)
NEUTROPHILS NFR BLD AUTO: 81 %
NRBC # BLD AUTO: 0 10E3/UL
NRBC BLD AUTO-RTO: 0 /100
PHOSPHATE SERPL-MCNC: 2.6 MG/DL (ref 2.5–4.5)
PLATELET # BLD AUTO: 157 10E3/UL (ref 150–450)
POTASSIUM BLD-SCNC: 3.2 MMOL/L (ref 3.4–5.3)
RBC # BLD AUTO: 4.35 10E6/UL (ref 4.4–5.9)
SODIUM SERPL-SCNC: 143 MMOL/L (ref 133–144)
WBC # BLD AUTO: 10.2 10E3/UL (ref 4–11)

## 2022-01-20 PROCEDURE — 85025 COMPLETE CBC W/AUTO DIFF WBC: CPT | Performed by: INTERNAL MEDICINE

## 2022-01-20 PROCEDURE — 83735 ASSAY OF MAGNESIUM: CPT | Performed by: INTERNAL MEDICINE

## 2022-01-20 PROCEDURE — 84100 ASSAY OF PHOSPHORUS: CPT | Performed by: INTERNAL MEDICINE

## 2022-01-20 PROCEDURE — 80048 BASIC METABOLIC PNL TOTAL CA: CPT | Performed by: INTERNAL MEDICINE

## 2022-01-20 NOTE — TELEPHONE ENCOUNTER
MATHEUS-PROCEDURAL ANTICOAGULATION  MANAGEMENT    ASSESSMENT     Warfarin interruption plan for Bronchoscopy with Biopsies on 1/26/22.    Indication for Anticoagulation: Atrial Fibrillation and DVT      [id number] =5 (Hypertension, Diabetes, Stroke and Vascular)      Acute to subacute CVA 10/22/21, presumed embolic      Right subclavian DVT 11/4/21      Bleeding risk:  o Recurrent GIB 10/22/21 hgb to 6.6 and 11/2/21 hgb to 5.8 with hypovolemic shock and PEA arrest      Heparin held 11/2 to 11/5- resumed 11/5 at low intensity (goal anti-xa 0.25-0.5) with Right subclavian DVT. Last heparin rate: 900 units/hr = 21,600 units/day (11/20) Last antiXa 11/20: 0.4      Warfarin started 11/16/21, therapeutic 11/20/21      Bilateral lung transplant 10/16/21      [place]-Procedure Risk stratification for thromboembolism: high (2012 Chest guidelines, 2017 ACC periprocedure pathway for NVAF Expert Consensus and 2018 American Society of Hematology guideline)    NVAF: 2017 ACC periprocedure pathway for NVAF  for high risk stratification ([id number] score 7-9 OR hx of stroke, TIA or systemic embolism within 3 months) advises no bridge if major bleed within last 3 months and otherwise likely bridge    2012 CHEST Perioperative Management guidelines suggests bridging patients with a mechanical heart valve, atrial fibrillation, or VTE at high risk for thromboembolism     RECOMMENDATION     Stroke  ~ 3 months ago, DVT within 2.5 months, GI bleed within 2.5 months; ensure bronchoscopy optimally timed      For Anticipated bronch on 1/26 consider:  o Holding warfarin 4 days (INR 1.58 yesterday)  o Bridge with Lovenox 60 mg Q 12 hours (1 mg/kg Q 12 hrs for CrCl > 60 and BMI <= 40)  - Start Enoxaparin 1/22  - Last dose 1/25 AM      Post-Procedure:  o Resume warfarin dose if okay with provider doing procedure on night of procedure, 1/26 PM: 3 mg x 1 then resume current dose    o Resume bridging ~ 24 hours post procedure (prophyalxis dose); 48-72  "hours post procedure (therapeutic) as directed by provider performing procedure.        Plan routed to referring provider for approval  ?   Antonella Ren, [facility]    SUBJECTIVE/OBJECTIVE     Edson Thornton, a 56 year old male    Goal INR Range: 2.0-3.0     Wt Readings from Last 3 Encounters:   01/19/22 63 kg (139 lb)   01/10/22 64.9 kg (143 lb)   01/07/22 64 kg (141 lb)      Ideal body weight: 61 kg (134 lb 9.1 oz)  Adjusted ideal body weight: 61.8 kg (136 lb 5.5 oz)     Estimated body mass index is 23.27 kg/m  as calculated from the following:    Height as of 1/19/22: 1.646 m (5' 4.8\").    Weight as of 1/19/22: 63 kg (139 lb).    Lab Results   Component Value Date    INR 1.58 (H) 01/19/2022    INR 1.8 (H) 01/17/2022    INR 2.5 (H) 01/14/2022     Lab Results   Component Value Date    HGB 11.8 01/20/2022    HCT 36.3 01/20/2022     01/20/2022     Lab Results   Component Value Date    CR 0.84 01/20/2022    CR 1.05 01/17/2022    CR 1.19 01/10/2022     Estimated Creatinine Clearance: 87.6 mL/min (based on SCr of 0.84 mg/dL).  "

## 2022-01-20 NOTE — TELEPHONE ENCOUNTER
----- Message -----   From: Mady Marin RN   Sent: 1/19/2022   5:27 PM CST   To: [facility]   Subject: Bronchoscopy 1/26 - hold coumadin                 Hello,     I'm going to schedule Bret for a bronchoscopy 1/26 morning. We will be obtaining biopsies.     Can someone please call Bret with a plan for his anticoagulation?     Thanks!   Mady

## 2022-01-21 ENCOUNTER — HOME INFUSION (PRE-WILLOW HOME INFUSION) (OUTPATIENT)
Dept: PHARMACY | Facility: CLINIC | Age: 57
End: 2022-01-21
Payer: COMMERCIAL

## 2022-01-21 ENCOUNTER — TELEPHONE (OUTPATIENT)
Dept: TRANSPLANT | Facility: CLINIC | Age: 57
End: 2022-01-21
Payer: COMMERCIAL

## 2022-01-21 ENCOUNTER — ANTICOAGULATION THERAPY VISIT (OUTPATIENT)
Dept: ANTICOAGULATION | Facility: CLINIC | Age: 57
End: 2022-01-21
Payer: COMMERCIAL

## 2022-01-21 DIAGNOSIS — I48.91 ATRIAL FIBRILLATION, UNSPECIFIED TYPE (H): Primary | ICD-10-CM

## 2022-01-21 LAB
HBV DNA SERPL QL NAA+PROBE: NORMAL
HCV RNA SERPL QL NAA+PROBE: NORMAL
HIV1+2 RNA SERPL QL NAA+PROBE: NORMAL
INR (EXTERNAL): 1.8 (ref 0.9–1.1)

## 2022-01-21 NOTE — PROGRESS NOTES
The following plan was approved by Dr. Woods in preparation for a bronch with biopsy on 1/26.    Hold warfarin X 4 days and bridge with Lovenox 1mg/kg    Current weight is 63 kilos    1/22 Start holding warfarin   1/23 hold warfarin  1/24 start Lovenox 60mg Q 12 hours  1/25 Lovenox 60mg AM dose only  1/26/22 Day of procedure.  If okay to restart anticoagulation then patient will restart warfarin evening of procedure and Lovenox should be started POD# 1.  Patient should remain on Lovenox until INR therapeutic    Plan is discussed with patient and outline of plan is sent via my chart.

## 2022-01-21 NOTE — TELEPHONE ENCOUNTER
Patient Mg 1.4  Discussed with Dr. Woods. Increase Mg oxide to 3 tablets (1200mg) BID.    Patient verbalized understanding and agreement. Will watch for GI symptoms (abdominal cramping, diarrhea) and decrease back down to two tablets BID if symptoms occur.     Will call back with questions, concerns, updates.

## 2022-01-21 NOTE — PROGRESS NOTES
ANTICOAGULATION MANAGEMENT     Edson Thornton 56 year old male is on warfarin with subtherapeutic INR result. (Goal INR 2.0-3.0)    Recent labs: (last 7 days)     01/21/22  0000   INR 1.8*       ASSESSMENT     Source(s): Home Care/Facility Nurse     Warfarin doses taken: Warfarin taken as instructed  Diet: No new diet changes identified  New illness, injury, or hospitalization: No  Medication/supplement changes: None noted  Signs or symptoms of bleeding or clotting: No  Previous INR: Subtherapeutic  Additional findings: Upcoming surgery/procedure 1/26. Per previous note pt will start holding Warfarin on 1/22 and bridge with Lovenox      PLAN     Recommended plan for temporary change(s) affecting INR     Dosing Instructions: Pt will take a 2mg dose tonight then start holding Warfarin on 1/22 and will bridge with Lovenox. with next INR in 5 days       Summary  As of 1/21/2022    Full warfarin instructions:  1/22: Hold; 1/23: Hold; 1/24: Hold; 1/25: Hold; Otherwise 1 mg every Sun, Tue; 2 mg all other days   Next INR check:               Telephone call with home care nurse Charleen  who verbalizes understanding and agrees to plan and who agrees to plan and repeated back plan correctly    Lab visit scheduled    Education provided: None required    Plan made per [facility] anticoagulation protocol    Suni Choi RN  Anticoagulation Clinic  1/21/2022    _______________________________________________________________________     Anticoagulation Episode Summary     Current INR goal:  2.0-3.0   TTR:  58.3 % (1.9 wk)   Target end date:  Indefinite   Send INR reminders to:  [facility]    Indications    Atrial fibrillation  unspecified type (H) [I48.91]           Comments:  [facility] P: [phone]  Transplant Labs twice a week  Staying Local: [facility] then back to [place]         Anticoagulation Care Providers     Provider Role Specialty Phone number    Abiodun Woods MD Referring Pulmonary  [facility] [phone]

## 2022-01-24 DIAGNOSIS — D84.9 IMMUNOSUPPRESSED STATUS (H): ICD-10-CM

## 2022-01-24 DIAGNOSIS — Z94.2 S/P LUNG TRANSPLANT (H): Primary | ICD-10-CM

## 2022-01-25 ENCOUNTER — LAB (OUTPATIENT)
Dept: LAB | Facility: CLINIC | Age: 57
End: 2022-01-25
Payer: COMMERCIAL

## 2022-01-25 ENCOUNTER — OFFICE VISIT (OUTPATIENT)
Dept: TRANSPLANT | Facility: CLINIC | Age: 57
End: 2022-01-25
Attending: INTERNAL MEDICINE
Payer: COMMERCIAL

## 2022-01-25 ENCOUNTER — HOME INFUSION (PRE-WILLOW HOME INFUSION) (OUTPATIENT)
Dept: PHARMACY | Facility: CLINIC | Age: 57
End: 2022-01-25

## 2022-01-25 ENCOUNTER — DOCUMENTATION ONLY (OUTPATIENT)
Dept: ANTICOAGULATION | Facility: CLINIC | Age: 57
End: 2022-01-25

## 2022-01-25 ENCOUNTER — ANCILLARY PROCEDURE (OUTPATIENT)
Dept: GENERAL RADIOLOGY | Facility: CLINIC | Age: 57
End: 2022-01-25
Attending: INTERNAL MEDICINE
Payer: COMMERCIAL

## 2022-01-25 ENCOUNTER — LAB (OUTPATIENT)
Dept: LAB | Facility: CLINIC | Age: 57
End: 2022-01-25
Attending: INTERNAL MEDICINE

## 2022-01-25 ENCOUNTER — TELEPHONE (OUTPATIENT)
Dept: TRANSPLANT | Facility: CLINIC | Age: 57
End: 2022-01-25

## 2022-01-25 VITALS
WEIGHT: 146 LBS | HEART RATE: 78 BPM | OXYGEN SATURATION: 96 % | SYSTOLIC BLOOD PRESSURE: 145 MMHG | DIASTOLIC BLOOD PRESSURE: 88 MMHG | BODY MASS INDEX: 24.45 KG/M2

## 2022-01-25 DIAGNOSIS — Z94.2 S/P LUNG TRANSPLANT (H): ICD-10-CM

## 2022-01-25 DIAGNOSIS — D84.9 IMMUNOSUPPRESSED STATUS (H): ICD-10-CM

## 2022-01-25 DIAGNOSIS — Z94.2 S/P LUNG TRANSPLANT (H): Primary | ICD-10-CM

## 2022-01-25 DIAGNOSIS — M06.9 RHEUMATOID ARTHRITIS INVOLVING BOTH HANDS, UNSPECIFIED WHETHER RHEUMATOID FACTOR PRESENT (H): ICD-10-CM

## 2022-01-25 DIAGNOSIS — Z94.2 LUNG REPLACED BY TRANSPLANT (H): ICD-10-CM

## 2022-01-25 DIAGNOSIS — Z94.2 S/P LUNG TRANSPLANT (H): Chronic | ICD-10-CM

## 2022-01-25 DIAGNOSIS — H53.2 DIPLOPIA: ICD-10-CM

## 2022-01-25 DIAGNOSIS — H92.21 BLOOD IN EAR CANAL, RIGHT: ICD-10-CM

## 2022-01-25 DIAGNOSIS — I48.91 ATRIAL FIBRILLATION, UNSPECIFIED TYPE (H): ICD-10-CM

## 2022-01-25 DIAGNOSIS — Z79.4 TYPE 2 DIABETES MELLITUS WITH HYPERGLYCEMIA, WITH LONG-TERM CURRENT USE OF INSULIN (H): ICD-10-CM

## 2022-01-25 DIAGNOSIS — B00.2 PRIMARY HSV INFECTION OF MOUTH: ICD-10-CM

## 2022-01-25 DIAGNOSIS — J84.178: ICD-10-CM

## 2022-01-25 DIAGNOSIS — E11.65 TYPE 2 DIABETES MELLITUS WITH HYPERGLYCEMIA, WITH LONG-TERM CURRENT USE OF INSULIN (H): ICD-10-CM

## 2022-01-25 DIAGNOSIS — I48.91 ATRIAL FIBRILLATION, UNSPECIFIED TYPE (H): Primary | ICD-10-CM

## 2022-01-25 DIAGNOSIS — M05.10: ICD-10-CM

## 2022-01-25 LAB
ANION GAP SERPL CALCULATED.3IONS-SCNC: 7 MMOL/L (ref 3–14)
BUN SERPL-MCNC: 17 MG/DL (ref 7–30)
CALCIUM SERPL-MCNC: 9.5 MG/DL (ref 8.5–10.1)
CHLORIDE BLD-SCNC: 109 MMOL/L (ref 94–109)
CMV DNA SPEC NAA+PROBE-ACNC: NOT DETECTED IU/ML
CO2 SERPL-SCNC: 28 MMOL/L (ref 20–32)
CREAT SERPL-MCNC: 0.8 MG/DL (ref 0.66–1.25)
ERYTHROCYTE [DISTWIDTH] IN BLOOD BY AUTOMATED COUNT: 15.3 % (ref 10–15)
EXPTIME-PRE: 8.05 SEC
FEF2575-%PRED-PRE: 20 %
FEF2575-PRE: 0.58 L/SEC
FEF2575-PRED: 2.83 L/SEC
FEFMAX-%PRED-PRE: 48 %
FEFMAX-PRE: 4.09 L/SEC
FEFMAX-PRED: 8.37 L/SEC
FEV1-%PRED-PRE: 39 %
FEV1-PRE: 1.24 L
FEV1FEV6-PRE: 59 %
FEV1FEV6-PRED: 80 %
FEV1FVC-PRE: 58 %
FEV1FVC-PRED: 79 %
FIFMAX-PRE: 3.6 L/SEC
FVC-%PRED-PRE: 53 %
FVC-PRE: 2.12 L
FVC-PRED: 3.98 L
GFR SERPL CREATININE-BSD FRML MDRD: >90 ML/MIN/1.73M2
GLUCOSE BLD-MCNC: 98 MG/DL (ref 70–99)
HCT VFR BLD AUTO: 38.3 % (ref 40–53)
HGB BLD-MCNC: 12.1 G/DL (ref 13.3–17.7)
INR PPP: 1.11 (ref 0.85–1.15)
MAGNESIUM SERPL-MCNC: 1.5 MG/DL (ref 1.6–2.3)
MCH RBC QN AUTO: 26.7 PG (ref 26.5–33)
MCHC RBC AUTO-ENTMCNC: 31.6 G/DL (ref 31.5–36.5)
MCV RBC AUTO: 84 FL (ref 78–100)
PLATELET # BLD AUTO: 170 10E3/UL (ref 150–450)
POTASSIUM BLD-SCNC: 4.2 MMOL/L (ref 3.4–5.3)
RBC # BLD AUTO: 4.54 10E6/UL (ref 4.4–5.9)
SARS-COV-2 RNA RESP QL NAA+PROBE: NEGATIVE
SODIUM SERPL-SCNC: 144 MMOL/L (ref 133–144)
TACROLIMUS BLD-MCNC: 4.9 UG/L (ref 5–15)
TME LAST DOSE: ABNORMAL H
TME LAST DOSE: ABNORMAL H
WBC # BLD AUTO: 9.3 10E3/UL (ref 4–11)

## 2022-01-25 PROCEDURE — 99000 SPECIMEN HANDLING OFFICE-LAB: CPT | Performed by: PATHOLOGY

## 2022-01-25 PROCEDURE — 71046 X-RAY EXAM CHEST 2 VIEWS: CPT | Mod: GC | Performed by: RADIOLOGY

## 2022-01-25 PROCEDURE — 99417 PROLNG OP E/M EACH 15 MIN: CPT | Performed by: INTERNAL MEDICINE

## 2022-01-25 PROCEDURE — 80048 BASIC METABOLIC PNL TOTAL CA: CPT | Performed by: PATHOLOGY

## 2022-01-25 PROCEDURE — 83735 ASSAY OF MAGNESIUM: CPT | Performed by: PATHOLOGY

## 2022-01-25 PROCEDURE — U0003 INFECTIOUS AGENT DETECTION BY NUCLEIC ACID (DNA OR RNA); SEVERE ACUTE RESPIRATORY SYNDROME CORONAVIRUS 2 (SARS-COV-2) (CORONAVIRUS DISEASE [COVID-19]), AMPLIFIED PROBE TECHNIQUE, MAKING USE OF HIGH THROUGHPUT TECHNOLOGIES AS DESCRIBED BY CMS-2020-01-R: HCPCS | Mod: 90 | Performed by: PATHOLOGY

## 2022-01-25 PROCEDURE — 36415 COLL VENOUS BLD VENIPUNCTURE: CPT | Performed by: PATHOLOGY

## 2022-01-25 PROCEDURE — 80197 ASSAY OF TACROLIMUS: CPT | Mod: 90 | Performed by: PATHOLOGY

## 2022-01-25 PROCEDURE — 85610 PROTHROMBIN TIME: CPT | Performed by: PATHOLOGY

## 2022-01-25 PROCEDURE — U0005 INFEC AGEN DETEC AMPLI PROBE: HCPCS | Mod: 90 | Performed by: PATHOLOGY

## 2022-01-25 PROCEDURE — 99215 OFFICE O/P EST HI 40 MIN: CPT | Mod: 25 | Performed by: INTERNAL MEDICINE

## 2022-01-25 PROCEDURE — 94375 RESPIRATORY FLOW VOLUME LOOP: CPT | Performed by: INTERNAL MEDICINE

## 2022-01-25 PROCEDURE — 85027 COMPLETE CBC AUTOMATED: CPT | Performed by: PATHOLOGY

## 2022-01-25 RX ORDER — MAGNESIUM OXIDE 400 MG/1
1200 TABLET ORAL 2 TIMES DAILY
Qty: 120 TABLET | Refills: 11 | COMMUNITY
Start: 2022-01-25 | End: 2022-02-11

## 2022-01-25 RX ORDER — VALACYCLOVIR HYDROCHLORIDE 1 G/1
1000 TABLET, FILM COATED ORAL 2 TIMES DAILY
Qty: 14 TABLET | Refills: 0 | OUTPATIENT
Start: 2022-01-25

## 2022-01-25 NOTE — NURSING NOTE
Chief Complaint   Patient presents with     RECHECK     1 week follow up       Blood pressure (!) 145/88, pulse 78, weight 66.2 kg (146 lb), SpO2 96 %.      Autumn Almonte, CMA

## 2022-01-25 NOTE — PROGRESS NOTES
Reason for Visit  Edson Thornton is a 56 year old year old male who is being seen for RECHECK (1 week follow up)      Assessment and plan:   Edson Thornton is a 56 year old male with NSIP/ILD, bronchiectasis, moderate PH, RA, SALTY, chronic HSV infection, hypogammaglobulinemia, steroid-induced diabetes, hypothyroidism, PFO, HTN, HLD, duodenal anomaly, anxiety, and depression. Admitted on 9/5/21 from OSH for acute on chronic respiratory failure 2/2 ILD exacerbation now s/p BSLT on 10/16/21. Prolonged vent wean s/p trach and PEG/J tube.  Post-op course also complicated by encephalopathy and diffuse weakness, acute to subacute CVA, afib with RVR, BRENNAN, GI bleed, Candidemia/Candida empyema, and anxiety. Code called 11/2 for bradycardia/asystole, progressive hypotension, found to have significant GI bleed, EGD with adherent clot near PEG tube site that was clipped.  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    Pulmonary/lung transplant: The patient reports gradual improvement in his exercise tolerance.  Occasional cough, unchanged.  Oxygen saturation is adequate.  Chest x-ray, reviewed by me with possible elevation of the right hemidiaphragm, unchanged from previous, no acute infiltrate and otherwise no change from previous.  Chest CT following last visit was reviewed by me, there is a small left effusion, parenchyma is unremarkable with resolution of previous groundglass changes, no explanation for decreased PFTs.  PFTs with severe ventilatory defect, not significantly changed from the past few visits.  Persistently below the level expected at this point following transplantation.  Etiology of the decreased PFTs is unclear.  With the elevated right hemidiaphragm, the possibility of diaphragm dysfunction needs to be considered.  Sniff test will be pursued.  With the persistently low  PFTs, will proceed with bronchoscopy with lavage and biopsy assuming that no significant airway stenosis is identified (discussed with bronchoscopist). Will include C4D staining if possible. The patient will continue his current immunosuppression.  Continue prednisone taper.  Continue current CellCept.  Tacrolimus will be adjusted to maintain a level of 8-12.      Date AM dose (mg) PM dose (mg)   12/5/21 10 10   1/2/22 10 7.5   1/30/22 7.5 7.5   2/27/22 7.5 5   3/27/22 5 5   4/24/22 5 2.5          Positive crossmatch: The patient had a retrospective positive crossmatch following transplantation, attributed to rituximab received in June.  Subsequent DSA is positive, have been increasing although recently improving.  Continue monitoring.  Date DSA mfi Biopsy (date) Treatment   10/17/2021  none      10/23/2021  none      11/1/2021  none      11/15/2021  none      11/29/2021  DQ B5  2522     12/6/2021  DQ B5  1873     12/12/2021  DQ B5  1703     12/27/2021  DQ B5  3924     1/3/2022  DQ B5  3072     1/10/2022  DQ B5  4032     1/17/2022  DQ B5  1849                  Prophylaxis: Continue Bactrim for PJP and nocardia.  Recent labial HSV resolving with valacyclovir.  Continue CMV and EBV monitoring.      Donor Recipient Intervention   CMV status Negative Negative CMV monthly   EBV status Positive Positive EBV every 3 months   HSV status N/A Positive S/p acyclovir POD #1-30 (recent infection history pre-txp)       Ophthalmology: The patient reports double vision and blind spots which have been present for weeks, possibly since transplant.  Possibly related to perioperative CVA but will pursue ophthalmology evaluation.    Rheumatoid arthritis: CCP antibodies elevated and elevated quantitative rheumatoid factor 5/11/2021 at [facility].  Transplant for RA associated ILD.  The patient reports persistent hand joint stiffness.  We will pursue rheumatology referral for further evaluation.  Previously treated with  rituximab.    Right ear bleeding: Old blood noted in auditory canal and tympanic membrane on exam today.  Patient denies any trauma or manipulation.  We will pursue ENT assessment.    Atrial fibrillation with RVR: Previously treated with amiodarone in October.  Metoprolol and amiodarone discontinued in November due to bradycardia.  The patient appears to be in sinus rhythm on exam today.  The patient is anticoagulated (although currently held for bronchoscopy).  Follow-up in EP clinic.    Klebsiella pneumonia: Completed levofloxacin 1 week ago.  Cavity on CT improving.    Disseminated Candida: Candidemia on 10/20 and 10/22, persistent Candida and respiratory cultures, candida empyema on 10/25.  IV fluconazole recently discontinued in ID clinic.  Now recommending initiation of oral fluconazole.    Recurrent HSV: Recent labial HSV, resolving with oral Gabrielle acyclovir.  If recurrent, will need chronic suppressive therapy.    Hypogammaglobulinemia: Last received IVIG on 11/18.  Continue monthly monitoring.    Obstructive sleep apnea: Reinitiated CPAP.  Will need repeat sleep study when fully recovered from transplant.    Hypomagnesemia: Magnesium level is low but improved from last visit.  Magnesium dose increased last visit.  Continue monitoring.    Hypertension: Blood pressure is adequately controlled with current amlodipine.    Depression/anxiety: Adequately controlled with current Lexapro.    History of GI bleed: EGD 11/5 with ulcer near the PEG bumper, gastritis and suction marks from the G-tube.  Continue PPI.    Steroid-induced hyperglycemia: Blood sugar adequately controlled with current long-acting insulin.    Hypothyroidism: Continue current levothyroxine.    Multifocal acute to subacute ischemic stroke: etiology likely embolic vs perioperative. MRI brain 10/23 with infarcts noted in both cerebral hemispheres, left cerebellum, presumed associated with new Afib vs perioperative. Bilateral upper extremity paresis  (R>L), suspicion for some cortical blindness. SBP goal < 140.   - Continue warfarin, HTN and BS control and rosuvastatin  - Needs to schedule f/u with Neurology    History of severe malnutrition: PEG/J removed 12/24.  Oral intake adequate.  Weight increased.  Continue supplemental vitamins.    Follow-up in 2 weeks with labs, x-ray and PFTs.  Bronchoscopy tomorrow.  Referrals initiated for rheumatology, ophthalmology, otolaryngology and electrophysiology.  Order for sniff test submitted.      90  minutes required on the day of the visit to review chart, interview and examine patient, review labs and imaging, formulate a plan, document and submit orders.     Abiodun Woods MD     Lung TX HPI  Transplants:  10/16/2021 (Lung), Postoperative day:  101    The patient was seen and examined by Abiodun Woods MD   Edson Thornton is a 56 year old male with NSIP/ILD, bronchiectasis, moderate PH, RA, SALTY, chronic HSV infection, hypogammaglobulinemia, steroid-induced diabetes, hypothyroidism, PFO, HTN, HLD, duodenal anomaly, anxiety, and depression. Admitted on 9/5/21 from OSH for acute on chronic respiratory failure 2/2 ILD exacerbation now s/p BSLT on 10/16/21. Prolonged vent wean s/p trach and PEG/J tube.  Post-op course also complicated by encephalopathy and diffuse weakness, acute to subacute CVA, afib with RVR, BRENNAN, GI bleed, Candidemia/Candida empyema, and anxiety. Code called 11/2 for bradycardia/asystole, progressive hypotension, found to have significant GI bleed, EGD with adherent clot near PEG tube site that was clipped.  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    Breathing is comfortable at rest.  The patient reports gradual improvement in exercise tolerance.  Still limited to walking short distances.  Limited both by lower extremity weakness and dyspnea.   Occasional cough mostly in the morning.  No sputum production.  Cough is unchanged.  No fever, chills or night sweats.    Review of systems:  Appetite is good  Chronic ear plugging, unchanged  Double vision and blind spots since surgery.  No nausea, vomiting or abdominal pain.  Occasional loose stool.  Easy bruising  Joint stiffness in the hands.  A complete ROS was otherwise negative except as noted in the HPI.    Current Outpatient Medications   Medication     insulin glargine (LANTUS PEN) 100 UNIT/ML pen     magnesium oxide (MAG-OX) 400 MG tablet     pantoprazole (PROTONIX) 40 MG EC tablet     acetaminophen (TYLENOL) 325 MG tablet     amLODIPine (NORVASC) 5 MG tablet     calcium carbonate 600 mg-vitamin D 400 units (CALTRATE) 600-400 MG-UNIT per tablet     enoxaparin ANTICOAGULANT (LOVENOX) 60 MG/0.6ML syringe     escitalopram (LEXAPRO) 20 MG tablet     fluticasone (FLONASE) 50 MCG/ACT nasal spray     folic acid-vit B6-vit B12 (FOLGARD) 0.8-10-0.115 MG TABS per tablet     levalbuterol (XOPENEX HFA) 45 MCG/ACT inhaler     levothyroxine (SYNTHROID/LEVOTHROID) 25 MCG tablet     Melatonin 10 MG TABS tablet     multivitamin w/minerals (THERA-VIT-M) tablet     mycophenolate (GENERIC EQUIVALENT) 250 MG capsule     nystatin (MYCOSTATIN) 366797 UNIT/ML suspension     ondansetron (ZOFRAN-ODT) 4 MG ODT tab     predniSONE (DELTASONE) 5 MG tablet     rosuvastatin (CRESTOR) 10 MG tablet     sulfamethoxazole-trimethoprim (BACTRIM) 400-80 MG tablet     tacrolimus (GENERIC EQUIVALENT) 0.5 MG capsule     tacrolimus (GENERIC EQUIVALENT) 1 MG capsule     valACYclovir (VALTREX) 1000 mg tablet     warfarin ANTICOAGULANT (COUMADIN) 1 MG tablet     No current facility-administered medications for this visit.     No Known Allergies  Past Medical History:   Diagnosis Date     BRENNAN (acute kidney injury) (H) 10/17/2021     Anxiety      Depression      HLD (hyperlipidemia)      HTN (hypertension)      Hypothyroidism      ILD (interstitial  lung disease) (H)      SALTY on CPAP      Oxygen dependent     BL 4L since ~6/2021     Rheumatoid arthritis (H)     signs ~5/2020, dx 5/2021     S/P lung transplant (H) 10/16/2021     Shock liver 10/17/2021     Steroid-induced hyperglycemia      Traction bronchiectasis (H)        Past Surgical History:   Procedure Laterality Date     COLONOSCOPY W/ BIOPSIES AND POLYPECTOMY  07/21/2020     CV CORONARY ANGIOGRAM N/A 09/08/2021    Procedure: Coronary Angiogram with possible intervention;  Surgeon: Jovon Bullock MD;  Location:  HEART CARDIAC CATH LAB     CV RIGHT HEART CATH MEASUREMENTS RECORDED N/A 09/08/2021    Procedure: Right Heart Cath;  Surgeon: Jovon Bullock MD;  Location:  HEART CARD[facility]AC CATH LAB     ESOPHAGOSCOPY, GASTROSCOPY, DUODENOSCOPY (EGD), COMBINED N/A 10/23/2021    Procedure: ESOPHAGOGASTRODUODENOSCOPY (EGD);  Surgeon: Miquel Pisano MD;  Location:  GI     ESOPHAGOSCOPY, GASTROSCOPY, DUODENOSCOPY (EGD), COMBINED N/A 11/02/2021    Procedure: ESOPHAGOGASTRODUODENOSCOPY (EGD);  Surgeon: Daniel Ortiz MD;  Location:  GI     ESOPHAGOSCOPY, GASTROSCOPY, DUODENOSCOPY (EGD), COMBINED N/A 11/5/2021    Procedure: ESOPHAGOGASTRODUODENOSCOPY (EGD);  Surgeon: Ronnell Hernandez MD;  Location:  GI     EXTRACTION(S) DENTAL N/A 09/22/2021    Procedure: EXTRACTION tooth #19;  Surgeon: Deepak Tobin DDS;  Location: [facility] OR     HERNIA REPAIR       IR CHEST TUBE PLACEMENT NON-TUNNELLED LEFT  11/03/2021     PICC TRIPLE LUMEN PLACEMENT Left 11/04/2021    5FR TL PICC. Right non occlusive thrombus subclavian vein.     REPLACE GASTROJEJUNOSTOMY TUBE, PERCUTANEOUS N/A 11/9/2021    Procedure: REPLACEMENT, GASTROJEJUNOSTOMY TUBE, PERCUTANEOUS;  Surgeon: Hernando Rodriguez MD;  Location: [facility] GI     right acl       TRACHEOSTOMY PERCUTANEOUS N/A 10/29/2021    Procedure: Percutaneous Tracheostomy,;  Surgeon: Celine Jenkins MD;  Location: [facility] OR     TRANSPLANT LUNG RECIPIENT SINGLE  X2 Bilateral 10/16/2021    Procedure: TRANSPLANT, LUNG, RECIPIENT, BILATERAL, Bronchoscopy, on-pump perfusion, bilateral clamshell sternotomy;  Surgeon: Yanick Corral MD;  Location: [facility] OR     XR ACROMIOCLAVICULAR JOINT BILATERAL         Social History     Socioeconomic History     Marital status: Single     Spouse name: Not on file     Number of children: Not on file     Years of education: Not on file     Highest education level: Not on file   Occupational History     Not on file   Tobacco Use     Smoking status: Never Smoker     Smokeless tobacco: Never Used   Substance and Sexual Activity     Alcohol use: Never     Drug use: Never     Sexual activity: Not on file   Other Topics Concern     Parent/sibling w/ CABG, MI or angioplasty before 65F 55M? Not Asked   Social History Narrative     Not on file     Social Determinants of Health     Financial Resource Strain: Not on file   Food Insecurity: Not on file   Transportation Needs: Not on file   Physical Activity: Not on file   Stress: Not on file   Social Connections: Not on file   Intimate Partner Violence: Not on file   Housing Stability: Not on file         BP (!) 145/88   Pulse 78   Wt 66.2 kg (146 lb)   SpO2 96%   BMI 24.45 kg/m    Body mass index is 24.45 kg/m .  Exam:   GENERAL APPEARANCE: Well developed, well nourished, alert, and in no apparent distress.  EYES: PERRL, EOMI  HENT: Nasal mucosa with no edema and no hyperemia. No nasal polyps.  EARS: Appears to be old blood in the right auditory canal and obscuring the right tympanic membrane.  No active bleeding appreciated left auditory canal is clear with normal tympanic membrane.  MOUTH: Oral mucosa is moist, without any lesions, no tonsillar enlargement, no oropharyngeal exudate.  NECK: supple, no masses, no thyromegaly.  LYMPHATICS: No significant axillary, cervical, or supraclavicular nodes.  RESP: normal percussion, decreased airflow at the right base.  No crackles. No rhonchi. No  wheezes.  CV: Normal S1, S2, regular rhythm, normal rate. No murmur.  No rub. No gallop. No LE edema.   ABDOMEN:  Bowel sounds normal, soft, nontender, no HSM or masses.   MS: extremities normal. (+)clubbing. No cyanosis.  SKIN: no rash on limited exam  NEURO: Mentation intact, speech normal, normal strength and tone, normal gait and stance  PSYCH: mentation appears normal. and affect normal/bright  Results:  Recent Results (from the past 168 hour(s))   INR    Collection Time: 01/19/22  9:16 AM   Result Value Ref Range    INR 1.58 (H) 0.85 - 1.15   HBV HCV HIV by VISHAL    Collection Time: 01/19/22  9:17 AM   Result Value Ref Range    HEPATITIS B BY VISHAL Non-Reactive     HCV by VISHAL Non-Reactive     HIV By Vishal Non-Reactive    General PFT Lab (Please always keep checked)    Collection Time: 01/19/22  9:25 AM   Result Value Ref Range    FVC-Pred 3.98 L    FVC-Pre 2.21 L    FVC-%Pred-Pre 55 %    FEV1-Pre 1.31 L    FEV1-%Pred-Pre 41 %    FEV1FVC-Pred 79 %    FEV1FVC-Pre 59 %    FEFMax-Pred 8.37 L/sec    FEFMax-Pre 4.05 L/sec    FEFMax-%Pred-Pre 48 %    FEF2575-Pred 2.83 L/sec    FEF2575-Pre 0.62 L/sec    DNP2844-%Pred-Pre 21 %    ExpTime-Pre 8.63 sec    FIFMax-Pre 4.05 L/sec    FEV1FEV6-Pred 80 %    FEV1FEV6-Pre 61 %   Basic metabolic panel    Collection Time: 01/20/22  2:30 PM   Result Value Ref Range    Sodium 143 133 - 144 mmol/L    Potassium 3.2 (L) 3.4 - 5.3 mmol/L    Chloride 111 (H) 94 - 109 mmol/L    Carbon Dioxide (CO2) 28 20 - 32 mmol/L    Anion Gap 4 3 - 14 mmol/L    Urea Nitrogen 22 7 - 30 mg/dL    Creatinine 0.84 0.66 - 1.25 mg/dL    Calcium 9.0 8.5 - 10.1 mg/dL    Glucose 113 (H) 70 - 99 mg/dL    GFR Estimate >90 >60 mL/min/1.73m2   Magnesium    Collection Time: 01/20/22  2:30 PM   Result Value Ref Range    Magnesium 1.4 (L) 1.6 - 2.3 mg/dL   Phosphorus    Collection Time: 01/20/22  2:30 PM   Result Value Ref Range    Phosphorus 2.6 2.5 - 4.5 mg/dL   CBC with platelets and differential    Collection Time:  01/20/22  2:30 PM   Result Value Ref Range    WBC Count 10.2 4.0 - 11.0 10e3/uL    RBC Count 4.35 (L) 4.40 - 5.90 10e6/uL    Hemoglobin 11.8 (L) 13.3 - 17.7 g/dL    Hematocrit 36.3 (L) 40.0 - 53.0 %    MCV 83 78 - 100 fL    MCH 27.1 26.5 - 33.0 pg    MCHC 32.5 31.5 - 36.5 g/dL    RDW 15.4 (H) 10.0 - 15.0 %    Platelet Count 157 150 - 450 10e3/uL    % Neutrophils 81 %    % Lymphocytes 9 %    % Monocytes 8 %    % Eosinophils 1 %    % Basophils 0 %    % Immature Granulocytes 1 %    NRBCs per 100 WBC 0 <1 /100    Absolute Neutrophils 8.4 (H) 1.6 - 8.3 10e3/uL    Absolute Lymphocytes 0.9 0.8 - 5.3 10e3/uL    Absolute Monocytes 0.8 0.0 - 1.3 10e3/uL    Absolute Eosinophils 0.1 0.0 - 0.7 10e3/uL    Absolute Basophils 0.0 0.0 - 0.2 10e3/uL    Absolute Immature Granulocytes 0.1 <=0.4 10e3/uL    Absolute NRBCs 0.0 10e3/uL   INR (External Result)    Collection Time: 01/21/22 12:00 AM   Result Value Ref Range    INR (External) 1.8 (A) 0.9 - 1.1   Basic metabolic panel    Collection Time: 01/25/22  9:54 AM   Result Value Ref Range    Sodium 144 133 - 144 mmol/L    Potassium 4.2 3.4 - 5.3 mmol/L    Chloride 109 94 - 109 mmol/L    Carbon Dioxide (CO2) 28 20 - 32 mmol/L    Anion Gap 7 3 - 14 mmol/L    Urea Nitrogen 17 7 - 30 mg/dL    Creatinine 0.80 0.66 - 1.25 mg/dL    Calcium 9.5 8.5 - 10.1 mg/dL    Glucose 98 70 - 99 mg/dL    GFR Estimate >90 >60 mL/min/1.73m2   Magnesium    Collection Time: 01/25/22  9:54 AM   Result Value Ref Range    Magnesium 1.5 (L) 1.6 - 2.3 mg/dL   CBC with platelets    Collection Time: 01/25/22  9:54 AM   Result Value Ref Range    WBC Count 9.3 4.0 - 11.0 10e3/uL    RBC Count 4.54 4.40 - 5.90 10e6/uL    Hemoglobin 12.1 (L) 13.3 - 17.7 g/dL    Hematocrit 38.3 (L) 40.0 - 53.0 %    MCV 84 78 - 100 fL    MCH 26.7 26.5 - 33.0 pg    MCHC 31.6 31.5 - 36.5 g/dL    RDW 15.3 (H) 10.0 - 15.0 %    Platelet Count 170 150 - 450 10e3/uL   INR    Collection Time: 01/25/22  9:54 AM   Result Value Ref Range    INR 1.11  0.85 - 1.15   Tacrolimus level    Collection Time: 01/25/22  9:54 AM   Result Value Ref Range    Tacrolimus by Tandem Mass Spectrometry 4.9 (L) 5.0 - 15.0 ug/L    Tacrolimus Last Dose Date 1/24/2022     Tacrolimus Last Dose Time  8:00 PM    General PFT Lab (Please always keep checked)    Collection Time: 01/25/22 10:09 AM   Result Value Ref Range    FVC-Pred 3.98 L    FVC-Pre 2.12 L    FVC-%Pred-Pre 53 %    FEV1-Pre 1.24 L    FEV1-%Pred-Pre 39 %    FEV1FVC-Pred 79 %    FEV1FVC-Pre 58 %    FEFMax-Pred 8.37 L/sec    FEFMax-Pre 4.09 L/sec    FEFMax-%Pred-Pre 48 %    FEF2575-Pred 2.83 L/sec    FEF2575-Pre 0.58 L/sec    GTB8450-%Pred-Pre 20 %    ExpTime-Pre 8.05 sec    FIFMax-Pre 3.60 L/sec    FEV1FEV6-Pred 80 %    FEV1FEV6-Pre 59 %       Results as noted above.    PFT Interpretation:  Severe obstructive ventilatory defect.  Unchanged from previous.   Similar to recent baseline.  Valid Maneuver

## 2022-01-25 NOTE — PROGRESS NOTES
Anticoagulation Management        Today's INR result of 1.11 is subtherapeutic (goal INR of 2.0-3.0).  Result received from: Clinic Lab    Follow up required to 1/26/22, patient scheduled for a Bronchoscopy.  Currently holding and bridging.    Will follow up post procedure on 1/26/22.  No call made to patient.      Anticoagulation clinic to follow up    Nick Hunter RN

## 2022-01-25 NOTE — TELEPHONE ENCOUNTER
Endoscopy calls to say patient did not get COVID test this AM.   Called lab, said no body did the test despite the order being in (and due 1/25) and note made in appointment.     Requested patient go to lab now to get pre-procedure COVID test.     Patient verbalized understanding and agreement of plan. Will call with issues

## 2022-01-25 NOTE — PATIENT INSTRUCTIONS
Patient Instructions  1. Continue to hydrate with 60-70 oz fluids daily.  2. Continue to exercise daily or most days of the week.  3. Follow up with your primary care provider for annual gender health maintenance procedures.  4. Follow up with colonoscopy schedule.  5. Follow up with annual dermatology visits.  6. It doesn't seem like the COVID vaccine is working well in lung transplant patients. A number of lung transplant patients have gotten sick with COVID even after receiving the vaccines.  Based on our recent experience, it can be life-threatening to get COVID  even after being vaccinated. Please continue to act like you did not get the COVID vaccine - social distancing, wearing a mask, good hand hygiene, etc. If the people around you are vaccinated, it will help reduce the risk of you getting COVID. All members of your household should be vaccinated.  7. We will get you scheduled for a sniff test with your next visit.   8. We're going to place a rheumatology referral  9. We are going to place an ENT (ear nose throat) referral   10. We are going to place a ophthalmology referral for your eyes.     Next transplant clinic appointment: 2 weeks with CXR, labs and PFTs  Next lab draw: weekly    You are scheduled for a bronchoscopy on 1/26 at 8AM at the [facility] on the 3rd floor. Arrive 1 hour early (7AM).     Instructions     1. Nothing to eat or drink 8 hours before procedure.  2. Hold your morning medications. Bring them with you to take after the procedure.  3. Have a  available to take you home.   4. Hold your dose of Lantus tonight or tomorrow morning.   5. Do not take any more Lovenox today or tomorrow morning.   6. If you are taking coumadin you will need contact your coumadin clinic for instructions.    ~~~~~~~~~~~~~~~~~~~~~~~~~    Thoracic Transplant Office phone [phone], fax [phone]    Office Hours 8:30 - 5:00     For after-hours urgent issues,  please dial [phone], and ask to speak with the Thoracic Transplant Coordinator On-Call.  --------------------  To expedite your medication refill(s), please contact your pharmacy and have them fax a refill request to: [phone]   *Please allow 3 business days for routine medication refills.  *Please allow 5 business days for controlled substance medication refills.    **For Diabetic medications and supplies refill(s), please contact your pharmacy and have them contact your Endocrine team.  --------------------  For scheduling appointments call [phone].  --------------------  Please Note: If you are active on [facility], all future test results will be sent by [facility] message only, and will no longer be called to patient. You may also receive communication directly from your physician.

## 2022-01-25 NOTE — LETTER
1/25/2022         RE: Edson Thornton  [address]  [place] [address]        Dear Colleague,    Thank you for referring your patient, Edson Thornton, to the [facility]. Please see a copy of my visit note below.    Reason for Visit  Edson Thornton is a 56 year old year old male who is being seen for RECHECK (1 week follow up)      Assessment and plan:   Edson Thornton is a 56 year old male with NSIP/ILD, bronchiectasis, moderate PH, RA, SALTY, chronic HSV infection, hypogammaglobulinemia, steroid-induced diabetes, hypothyroidism, PFO, HTN, HLD, duodenal anomaly, anxiety, and depression. Admitted on 9/5/21 from OSH for acute on chronic respiratory failure 2/2 ILD exacerbation now s/p BSLT on 10/16/21. Prolonged vent wean s/p trach and PEG/J tube.  Post-op course also complicated by encephalopathy and diffuse weakness, acute to subacute CVA, afib with RVR, BRENNAN, GI bleed, Candidemia/Candida empyema, and anxiety. Code called 11/2 for bradycardia/asystole, progressive hypotension, found to have significant GI bleed, EGD with adherent clot near PEG tube site that was clipped.  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    Pulmonary/lung transplant: The patient reports gradual improvement in his exercise tolerance.  Occasional cough, unchanged.  Oxygen saturation is adequate.  Chest x-ray, reviewed by me with possible elevation of the right hemidiaphragm, unchanged from previous, no acute infiltrate and otherwise no change from previous.  Chest CT following last visit was reviewed by me, there is a small left effusion, parenchyma is unremarkable with resolution of previous groundglass changes, no explanation for decreased PFTs.  PFTs with severe ventilatory defect, not significantly changed from the past few visits.  Persistently below the level  expected at this point following transplantation.  Etiology of the decreased PFTs is unclear.  With the elevated right hemidiaphragm, the possibility of diaphragm dysfunction needs to be considered.  Sniff test will be pursued.  With the persistently low PFTs, will proceed with bronchoscopy with lavage and biopsy assuming that no significant airway stenosis is identified (discussed with bronchoscopist). Will include C4D staining if possible. The patient will continue his current immunosuppression.  Continue prednisone taper.  Continue current CellCept.  Tacrolimus will be adjusted to maintain a level of 8-12.      Date AM dose (mg) PM dose (mg)   12/5/21 10 10   1/2/22 10 7.5   1/30/22 7.5 7.5   2/27/22 7.5 5   3/27/22 5 5   4/24/22 5 2.5          Positive crossmatch: The patient had a retrospective positive crossmatch following transplantation, attributed to rituximab received in June.  Subsequent DSA is positive, have been increasing although recently improving.  Continue monitoring.  Date DSA mfi Biopsy (date) Treatment   10/17/2021  none      10/23/2021  none      11/1/2021  none      11/15/2021  none      11/29/2021  DQ B5  2522     12/6/2021  DQ B5  1873     12/12/2021  DQ B5  1703     12/27/2021  DQ B5  3924     1/3/2022  DQ B5  3072     1/10/2022  DQ B5  4032     1/17/2022  DQ B5  1849                  Prophylaxis: Continue Bactrim for PJP and nocardia.  Recent labial HSV resolving with valacyclovir.  Continue CMV and EBV monitoring.      Donor Recipient Intervention   CMV status Negative Negative CMV monthly   EBV status Positive Positive EBV every 3 months   HSV status N/A Positive S/p acyclovir POD #1-30 (recent infection history pre-txp)       Ophthalmology: The patient reports double vision and blind spots which have been present for weeks, possibly since transplant.  Possibly related to perioperative CVA but will pursue ophthalmology evaluation.    Rheumatoid arthritis: CCP antibodies elevated and  elevated quantitative rheumatoid factor 5/11/2021 at [facility].  Transplant for RA associated ILD.  The patient reports persistent hand joint stiffness.  We will pursue rheumatology referral for further evaluation.  Previously treated with rituximab.    Right ear bleeding: Old blood noted in auditory canal and tympanic membrane on exam today.  Patient denies any trauma or manipulation.  We will pursue ENT assessment.    Atrial fibrillation with RVR: Previously treated with amiodarone in October.  Metoprolol and amiodarone discontinued in November due to bradycardia.  The patient appears to be in sinus rhythm on exam today.  The patient is anticoagulated (although currently held for bronchoscopy).  Follow-up in EP clinic.    Klebsiella pneumonia: Completed levofloxacin 1 week ago.  Cavity on CT improving.    Disseminated Candida: Candidemia on 10/20 and 10/22, persistent Candida and respiratory cultures, candida empyema on 10/25.  IV fluconazole recently discontinued in ID clinic.  Now recommending initiation of oral fluconazole.    Recurrent HSV: Recent labial HSV, resolving with oral Gabrielle acyclovir.  If recurrent, will need chronic suppressive therapy.    Hypogammaglobulinemia: Last received IVIG on 11/18.  Continue monthly monitoring.    Obstructive sleep apnea: Reinitiated CPAP.  Will need repeat sleep study when fully recovered from transplant.    Hypomagnesemia: Magnesium level is low but improved from last visit.  Magnesium dose increased last visit.  Continue monitoring.    Hypertension: Blood pressure is adequately controlled with current amlodipine.    Depression/anxiety: Adequately controlled with current Lexapro.    History of GI bleed: EGD 11/5 with ulcer near the PEG bumper, gastritis and suction marks from the G-tube.  Continue PPI.    Steroid-induced hyperglycemia: Blood sugar adequately controlled with current long-acting insulin.    Hypothyroidism: Continue current levothyroxine.    Multifocal  acute to subacute ischemic stroke: etiology likely embolic vs perioperative. MRI brain 10/23 with infarcts noted in both cerebral hemispheres, left cerebellum, presumed associated with new Afib vs perioperative. Bilateral upper extremity paresis (R>L), suspicion for some cortical blindness. SBP goal < 140.   - Continue warfarin, HTN and BS control and rosuvastatin  - Needs to schedule f/u with Neurology    History of severe malnutrition: PEG/J removed 12/24.  Oral intake adequate.  Weight increased.  Continue supplemental vitamins.    Follow-up in 2 weeks with labs, x-ray and PFTs.  Bronchoscopy tomorrow.  Referrals initiated for rheumatology, ophthalmology, otolaryngology and electrophysiology.  Order for sniff test submitted.      90  minutes required on the day of the visit to review chart, interview and examine patient, review labs and imaging, formulate a plan, document and submit orders.     Abiodun Woods MD     Lung TX HPI  Transplants:  10/16/2021 (Lung), Postoperative day:  101    The patient was seen and examined by Abiodun Woods MD   Edson Thornton is a 56 year old male with NSIP/ILD, bronchiectasis, moderate PH, RA, SALTY, chronic HSV infection, hypogammaglobulinemia, steroid-induced diabetes, hypothyroidism, PFO, HTN, HLD, duodenal anomaly, anxiety, and depression. Admitted on 9/5/21 from OSH for acute on chronic respiratory failure 2/2 ILD exacerbation now s/p BSLT on 10/16/21. Prolonged vent wean s/p trach and PEG/J tube.  Post-op course also complicated by encephalopathy and diffuse weakness, acute to subacute CVA, afib with RVR, BRENNAN, GI bleed, Candidemia/Candida empyema, and anxiety. Code called 11/2 for bradycardia/asystole, progressive hypotension, found to have significant GI bleed, EGD with adherent clot near PEG tube site that was clipped.  The patient had a positive crossmatch following transplant which was attributed to rituximab patient had received in June 2021 but  subsequently has had consistently positive DSA.    Breathing is comfortable at rest.  The patient reports gradual improvement in exercise tolerance.  Still limited to walking short distances.  Limited both by lower extremity weakness and dyspnea.  Occasional cough mostly in the morning.  No sputum production.  Cough is unchanged.  No fever, chills or night sweats.    Review of systems:  Appetite is good  Chronic ear plugging, unchanged  Double vision and blind spots since surgery.  No nausea, vomiting or abdominal pain.  Occasional loose stool.  Easy bruising  Joint stiffness in the hands.  A complete ROS was otherwise negative except as noted in the HPI.    Current Outpatient Medications   Medication     insulin glargine (LANTUS PEN) 100 UNIT/ML pen     magnesium oxide (MAG-OX) 400 MG tablet     pantoprazole (PROTONIX) 40 MG EC tablet     acetaminophen (TYLENOL) 325 MG tablet     amLODIPine (NORVASC) 5 MG tablet     calcium carbonate 600 mg-vitamin D 400 units (CALTRATE) 600-400 MG-UNIT per tablet     enoxaparin ANTICOAGULANT (LOVENOX) 60 MG/0.6ML syringe     escitalopram (LEXAPRO) 20 MG tablet     fluticasone (FLONASE) 50 MCG/ACT nasal spray     folic acid-vit B6-vit B12 (FOLGARD) 0.8-10-0.115 MG TABS per tablet     levalbuterol (XOPENEX HFA) 45 MCG/ACT inhaler     levothyroxine (SYNTHROID/LEVOTHROID) 25 MCG tablet     Melatonin 10 MG TABS tablet     multivitamin w/minerals (THERA-VIT-M) tablet     mycophenolate (GENERIC EQUIVALENT) 250 MG capsule     nystatin (MYCOSTATIN) 764234 UNIT/ML suspension     ondansetron (ZOFRAN-ODT) 4 MG ODT tab     predniSONE (DELTASONE) 5 MG tablet     rosuvastatin (CRESTOR) 10 MG tablet     sulfamethoxazole-trimethoprim (BACTRIM) 400-80 MG tablet     tacrolimus (GENERIC EQUIVALENT) 0.5 MG capsule     tacrolimus (GENERIC EQUIVALENT) 1 MG capsule     valACYclovir (VALTREX) 1000 mg tablet     warfarin ANTICOAGULANT (COUMADIN) 1 MG tablet     No current facility-administered medications  for this visit.     No Known Allergies  Past Medical History:   Diagnosis Date     BRENNAN (acute kidney injury) (H) 10/17/2021     Anxiety      Depression      HLD (hyperlipidemia)      HTN (hypertension)      Hypothyroidism      ILD (interstitial lung disease) (H)      SALTY on CPAP      Oxygen dependent     BL 4L since ~6/2021     Rheumatoid arthritis (H)     signs ~5/2020, dx 5/2021     S/P lung transplant (H) 10/16/2021     Shock liver 10/17/2021     Steroid-induced hyperglycemia      Traction bronchiectasis (H)        Past Surgical History:   Procedure Laterality Date     COLONOSCOPY W/ BIOPSIES AND POLYPECTOMY  07/21/2020     CV CORONARY ANGIOGRAM N/A 09/08/2021    Procedure: Coronary Angiogram with possible intervention;  Surgeon: Jovon Bullock MD;  Location:  HEART CARDIAC CATH LAB     CV RIGHT HEART CATH MEASUREMENTS RECORDED N/A 09/08/2021    Procedure: Right Heart Cath;  Surgeon: Jovon Bullock MD;  Location:  HEART CARDIAC CATH LAB     ESOPHAGOSCOPY, GASTROSCOPY, DUODENOSCOPY (EGD), COMBINED N/A 10/23/2021    Procedure: ESOPHAGOGASTRODUODENOSCOPY (EGD);  Surgeon: Miquel Pisano MD;  Location:  GI     ESOPHAGOSCOPY, GASTROSCOPY, DUODENOSCOPY (EGD), COMBINED N/A 11/02/2021    Procedure: ESOPHAGOGASTRODUODENOSCOPY (EGD);  Surgeon: Daniel Ortiz MD;  Location:  GI     ESOPHAGOSCOPY, GASTROSCOPY, DUODENOSCOPY (EGD), COMBINED N/A 11/5/2021    Procedure: ESOPHAGOGASTRODUODENOSCOPY (EGD);  Surgeon: Ronnell Hernandez MD;  Location:  GI     EXTRACTION(S) DENTAL N/A 09/22/2021    Procedure: EXTRACTION tooth #19;  Surgeon: Deepak Tobin DDS;  Location:  OR     HERNIA REPAIR       IR CHEST TUBE PLACEMENT NON-TUNNELLED LEFT  11/03/2021     PICC TRIPLE LUMEN PLACEMENT Left 11/04/2021    5FR TL PICC. Right non occlusive thrombus subclavian vein.     REPLACE GASTROJEJUNOSTOMY TUBE, PERCUTANEOUS N/A 11/9/2021    Procedure: REPLACEMENT, GASTROJEJUNOSTOMY TUBE,  PERCUTANEOUS;  Surgeon: Hernando Rodriguez MD;  Location: [facility] GI     right acl       TRACHEOSTOMY PERCUTANEOUS N/A 10/29/2021    Procedure: Percutaneous Tracheostomy,;  Surgeon: Celine Jenkins MD;  Location: [facility] OR     TRANSPLANT LUNG RECIPIENT SINGLE X2 Bilateral 10/16/2021    Procedure: TRANSPLANT, LUNG, RECIPIENT, BILATERAL, Bronchoscopy, on-pump perfusion, bilateral clamshell sternotomy;  Surgeon: Yanick Corral MD;  Location: [facility] OR     XR ACROMIOCLAVICULAR JOINT BILATERAL         Social History     Socioeconomic History     Marital status: Single     Spouse name: Not on file     Number of children: Not on file     Years of education: Not on file     Highest education level: Not on file   Occupational History     Not on file   Tobacco Use     Smoking status: Never Smoker     Smokeless tobacco: Never Used   Substance and Sexual Activity     Alcohol use: Never     Drug use: Never     Sexual activity: Not on file   Other Topics Concern     Parent/sibling w/ CABG, MI or angioplasty before 65F 55M? Not Asked   Social History Narrative     Not on file     Social Determinants of Health     Financial Resource Strain: Not on file   Food Insecurity: Not on file   Transportation Needs: Not on file   Physical Activity: Not on file   Stress: Not on file   Social Connections: Not on file   Intimate Partner Violence: Not on file   Housing Stability: Not on file         BP (!) 145/88   Pulse 78   Wt 66.2 kg (146 lb)   SpO2 96%   BMI 24.45 kg/m    Body mass index is 24.45 kg/m .  Exam:   GENERAL APPEARANCE: Well developed, well nourished, alert, and in no apparent distress.  EYES: PERRL, EOMI  HENT: Nasal mucosa with no edema and no hyperemia. No nasal polyps.  EARS: Appears to be old blood in the right auditory canal and obscuring the right tympanic membrane.  No active bleeding appreciated left auditory canal is clear with normal tympanic membrane.  MOUTH: Oral mucosa is moist, without any lesions, no tonsillar  enlargement, no oropharyngeal exudate.  NECK: supple, no masses, no thyromegaly.  LYMPHATICS: No significant axillary, cervical, or supraclavicular nodes.  RESP: normal percussion, decreased airflow at the right base.  No crackles. No rhonchi. No wheezes.  CV: Normal S1, S2, regular rhythm, normal rate. No murmur.  No rub. No gallop. No LE edema.   ABDOMEN:  Bowel sounds normal, soft, nontender, no HSM or masses.   MS: extremities normal. (+)clubbing. No cyanosis.  SKIN: no rash on limited exam  NEURO: Mentation intact, speech normal, normal strength and tone, normal gait and stance  PSYCH: mentation appears normal. and affect normal/bright  Results:  Recent Results (from the past 168 hour(s))   INR    Collection Time: 01/19/22  9:16 AM   Result Value Ref Range    INR 1.58 (H) 0.85 - 1.15   HBV HCV HIV by VISHAL    Collection Time: 01/19/22  9:17 AM   Result Value Ref Range    HEPATITIS B BY VISHAL Non-Reactive     HCV by VISHAL Non-Reactive     HIV By Vishal Non-Reactive    General PFT Lab (Please always keep checked)    Collection Time: 01/19/22  9:25 AM   Result Value Ref Range    FVC-Pred 3.98 L    FVC-Pre 2.21 L    FVC-%Pred-Pre 55 %    FEV1-Pre 1.31 L    FEV1-%Pred-Pre 41 %    FEV1FVC-Pred 79 %    FEV1FVC-Pre 59 %    FEFMax-Pred 8.37 L/sec    FEFMax-Pre 4.05 L/sec    FEFMax-%Pred-Pre 48 %    FEF2575-Pred 2.83 L/sec    FEF2575-Pre 0.62 L/sec    HBR3162-%Pred-Pre 21 %    ExpTime-Pre 8.63 sec    FIFMax-Pre 4.05 L/sec    FEV1FEV6-Pred 80 %    FEV1FEV6-Pre 61 %   Basic metabolic panel    Collection Time: 01/20/22  2:30 PM   Result Value Ref Range    Sodium 143 133 - 144 mmol/L    Potassium 3.2 (L) 3.4 - 5.3 mmol/L    Chloride 111 (H) 94 - 109 mmol/L    Carbon Dioxide (CO2) 28 20 - 32 mmol/L    Anion Gap 4 3 - 14 mmol/L    Urea Nitrogen 22 7 - 30 mg/dL    Creatinine 0.84 0.66 - 1.25 mg/dL    Calcium 9.0 8.5 - 10.1 mg/dL    Glucose 113 (H) 70 - 99 mg/dL    GFR Estimate >90 >60 mL/min/1.73m2   Magnesium    Collection Time:  01/20/22  2:30 PM   Result Value Ref Range    Magnesium 1.4 (L) 1.6 - 2.3 mg/dL   Phosphorus    Collection Time: 01/20/22  2:30 PM   Result Value Ref Range    Phosphorus 2.6 2.5 - 4.5 mg/dL   CBC with platelets and differential    Collection Time: 01/20/22  2:30 PM   Result Value Ref Range    WBC Count 10.2 4.0 - 11.0 10e3/uL    RBC Count 4.35 (L) 4.40 - 5.90 10e6/uL    Hemoglobin 11.8 (L) 13.3 - 17.7 g/dL    Hematocrit 36.3 (L) 40.0 - 53.0 %    MCV 83 78 - 100 fL    MCH 27.1 26.5 - 33.0 pg    MCHC 32.5 31.5 - 36.5 g/dL    RDW 15.4 (H) 10.0 - 15.0 %    Platelet Count 157 150 - 450 10e3/uL    % Neutrophils 81 %    % Lymphocytes 9 %    % Monocytes 8 %    % Eosinophils 1 %    % Basophils 0 %    % Immature Granulocytes 1 %    NRBCs per 100 WBC 0 <1 /100    Absolute Neutrophils 8.4 (H) 1.6 - 8.3 10e3/uL    Absolute Lymphocytes 0.9 0.8 - 5.3 10e3/uL    Absolute Monocytes 0.8 0.0 - 1.3 10e3/uL    Absolute Eosinophils 0.1 0.0 - 0.7 10e3/uL    Absolute Basophils 0.0 0.0 - 0.2 10e3/uL    Absolute Immature Granulocytes 0.1 <=0.4 10e3/uL    Absolute NRBCs 0.0 10e3/uL   INR (External Result)    Collection Time: 01/21/22 12:00 AM   Result Value Ref Range    INR (External) 1.8 (A) 0.9 - 1.1   Basic metabolic panel    Collection Time: 01/25/22  9:54 AM   Result Value Ref Range    Sodium 144 133 - 144 mmol/L    Potassium 4.2 3.4 - 5.3 mmol/L    Chloride 109 94 - 109 mmol/L    Carbon Dioxide (CO2) 28 20 - 32 mmol/L    Anion Gap 7 3 - 14 mmol/L    Urea Nitrogen 17 7 - 30 mg/dL    Creatinine 0.80 0.66 - 1.25 mg/dL    Calcium 9.5 8.5 - 10.1 mg/dL    Glucose 98 70 - 99 mg/dL    GFR Estimate >90 >60 mL/min/1.73m2   Magnesium    Collection Time: 01/25/22  9:54 AM   Result Value Ref Range    Magnesium 1.5 (L) 1.6 - 2.3 mg/dL   CBC with platelets    Collection Time: 01/25/22  9:54 AM   Result Value Ref Range    WBC Count 9.3 4.0 - 11.0 10e3/uL    RBC Count 4.54 4.40 - 5.90 10e6/uL    Hemoglobin 12.1 (L) 13.3 - 17.7 g/dL    Hematocrit 38.3  "(L) 40.0 - 53.0 %    MCV 84 78 - 100 fL    MCH 26.7 26.5 - 33.0 pg    MCHC 31.6 31.5 - 36.5 g/dL    RDW 15.3 (H) 10.0 - 15.0 %    Platelet Count 170 150 - 450 10e3/uL   INR    Collection Time: 01/25/22  9:54 AM   Result Value Ref Range    INR 1.11 0.85 - 1.15   Tacrolimus level    Collection Time: 01/25/22  9:54 AM   Result Value Ref Range    Tacrolimus by Tandem Mass Spectrometry 4.9 (L) 5.0 - 15.0 ug/L    Tacrolimus Last Dose Date 1/24/2022     Tacrolimus Last Dose Time  8:00 PM    General PFT Lab (Please always keep checked)    Collection Time: 01/25/22 10:09 AM   Result Value Ref Range    FVC-Pred 3.98 L    FVC-Pre 2.12 L    FVC-%Pred-Pre 53 %    FEV1-Pre 1.24 L    FEV1-%Pred-Pre 39 %    FEV1FVC-Pred 79 %    FEV1FVC-Pre 58 %    FEFMax-Pred 8.37 L/sec    FEFMax-Pre 4.09 L/sec    FEFMax-%Pred-Pre 48 %    FEF2575-Pred 2.83 L/sec    FEF2575-Pre 0.58 L/sec    [id number]-%Pred-Pre 20 %    ExpTime-Pre 8.05 sec    FIFMax-Pre 3.60 L/sec    FEV1FEV6-Pred 80 %    FEV1FEV6-Pre 59 %       Results as noted above.    PFT Interpretation:  Severe obstructive ventilatory defect.  Unchanged from previous.   Similar to recent baseline.  Valid Maneuver                Transplant Coordinator Note    Reason for visit: Post lung transplant follow up visit   Coordinator: Present   Caregiver:  SO     Health concerns addressed today:  1. Respiratory - no illnesses since last visit. No shortness of breath at rest. Getting winded with activities - improving and able to walk further. Able to walk around apartment without walker. When walking, stop because short of breath and leg weakness. Some coughing occasionally, no sputum - not new  2. GI: appetite good. No nausea/vomiting. Diarrhea off and on, BM 1-3x/day.   3.  ENT: ears \"plugged up\" - chronic issue.   4. Vision blurry, double vision since surgery    Activity/rehab: home OT/PT  Oxygen needs: room air, CPAP NOC  Pain management/RX: joint pain/stiffness  Next Bronch due: tomorrow, 1/26  CMV " status: D-/R-  EBV status: D+/R+  DVT/PE:h/o of stroke  Post op AFIB/follow up with EP: h/o a fib inpatient  AC/asa: on coumadin, bridged to Lovenox for bronch tomorrow  PJP prophylactic:  Bactrim    COVID:  1. COVID-19 infection (yes/no, date of most recent positive test):   2. Status/instructions given about COVID-19 vaccine:     Pt Education: medications (use/dose/side effects), how/when to call coordinator, frequency of labs, s/s of infection/rejection, call prior to starting any new medications, lab/vital sign book    Health Maintenance:     Last colonoscopy:     Next colonoscopy due:     Dermatology:    Vaccinations this visit:     Labs, CXR, PFTs reviewed with patient  Medication record reviewed and reconciled  Questions and concerns addressed    Patient Instructions  1. Continue to hydrate with 60-70 oz fluids daily.  2. Continue to exercise daily or most days of the week.  3. Follow up with your primary care provider for annual gender health maintenance procedures.  4. Follow up with colonoscopy schedule.  5. Follow up with annual dermatology visits.  6. It doesn't seem like the COVID vaccine is working well in lung transplant patients. A number of lung transplant patients have gotten sick with COVID even after receiving the vaccines.  Based on our recent experience, it can be life-threatening to get COVID  even after being vaccinated. Please continue to act like you did not get the COVID vaccine - social distancing, wearing a mask, good hand hygiene, etc. If the people around you are vaccinated, it will help reduce the risk of you getting COVID. All members of your household should be vaccinated.  7. We will get you scheduled for a sniff test with your next visit.   8. We're going to place a rheumatology referral  9. We are going to place an ENT (ear nose throat) referral   10. We are going to place a ophthalmology referral for your eyes.     Next transplant clinic appointment: 2 weeks with CXR, labs and  PFTs  Next lab draw: weekly      AVS printed at time of check out            Again, thank you for allowing me to participate in the care of your patient.        Sincerely,        Abiodun Woods MD

## 2022-01-25 NOTE — PROGRESS NOTES
"Transplant Coordinator Note    Reason for visit: Post lung transplant follow up visit   Coordinator: Present   Caregiver:  SO     Health concerns addressed today:  1. Respiratory - no illnesses since last visit. No shortness of breath at rest. Getting winded with activities - improving and able to walk further. Able to walk around apartment without walker. When walking, stop because short of breath and leg weakness. Some coughing occasionally, no sputum - not new  2. GI: appetite good. No nausea/vomiting. Diarrhea off and on, BM 1-3x/day.   3.  ENT: ears \"plugged up\" - chronic issue.   4. Vision blurry, double vision since surgery    Activity/rehab: home OT/PT  Oxygen needs: room air, CPAP NOC  Pain management/RX: joint pain/stiffness  Next Bronch due: tomorrow, 1/26  CMV status: D-/R-  EBV status: D+/R+  DVT/PE:h/o of stroke  Post op AFIB/follow up with EP: h/o a fib inpatient  AC/asa: on coumadin, bridged to Lovenox for bronch tomorrow  PJP prophylactic:  Bactrim    COVID:  1. COVID-19 infection (yes/no, date of most recent positive test):   2. Status/instructions given about COVID-19 vaccine:     Pt Education: medications (use/dose/side effects), how/when to call coordinator, frequency of labs, s/s of infection/rejection, call prior to starting any new medications, lab/vital sign book    Health Maintenance:     Last colonoscopy:     Next colonoscopy due:     Dermatology:    Vaccinations this visit:     Labs, CXR, PFTs reviewed with patient  Medication record reviewed and reconciled  Questions and concerns addressed    Patient Instructions  1. Continue to hydrate with 60-70 oz fluids daily.  2. Continue to exercise daily or most days of the week.  3. Follow up with your primary care provider for annual gender health maintenance procedures.  4. Follow up with colonoscopy schedule.  5. Follow up with annual dermatology visits.  6. It doesn't seem like the COVID vaccine is working well in lung transplant patients. A " number of lung transplant patients have gotten sick with COVID even after receiving the vaccines.  Based on our recent experience, it can be life-threatening to get COVID  even after being vaccinated. Please continue to act like you did not get the COVID vaccine - social distancing, wearing a mask, good hand hygiene, etc. If the people around you are vaccinated, it will help reduce the risk of you getting COVID. All members of your household should be vaccinated.  7. We will get you scheduled for a sniff test with your next visit.   8. We're going to place a rheumatology referral  9. We are going to place an ENT (ear nose throat) referral   10. We are going to place a ophthalmology referral for your eyes.     Next transplant clinic appointment: 2 weeks with CXR, labs and PFTs  Next lab draw: weekly      AVS printed at time of check out

## 2022-01-25 NOTE — LETTER
Date:January 28, 2022      Patient was self referred, no letter generated. Do not send.        [facility] Health Information

## 2022-01-25 NOTE — TELEPHONE ENCOUNTER
Tacrolimus level 4.9 at 14 hours, on 1/25/2022.  Goal 8-12.   Current dose 1.5 mg in AM, 1.5 mg in PM    Dose changed to 2 mg in AM, 2 mg in PM   Recheck level in 3-5    Discussed with patient   MyChart message sent

## 2022-01-26 ENCOUNTER — APPOINTMENT (OUTPATIENT)
Dept: GENERAL RADIOLOGY | Facility: CLINIC | Age: 57
End: 2022-01-26
Attending: INTERNAL MEDICINE
Payer: COMMERCIAL

## 2022-01-26 ENCOUNTER — TELEPHONE (OUTPATIENT)
Dept: OPHTHALMOLOGY | Facility: CLINIC | Age: 57
End: 2022-01-26

## 2022-01-26 ENCOUNTER — OFFICE VISIT (OUTPATIENT)
Dept: CARDIOLOGY | Facility: CLINIC | Age: 57
End: 2022-01-26
Attending: INTERNAL MEDICINE
Payer: COMMERCIAL

## 2022-01-26 ENCOUNTER — ALLIED HEALTH/NURSE VISIT (OUTPATIENT)
Dept: RESEARCH | Facility: CLINIC | Age: 57
End: 2022-01-26

## 2022-01-26 ENCOUNTER — HOSPITAL ENCOUNTER (OUTPATIENT)
Facility: CLINIC | Age: 57
Discharge: HOME | End: 2022-01-26
Attending: INTERNAL MEDICINE | Admitting: INTERNAL MEDICINE
Payer: COMMERCIAL

## 2022-01-26 ENCOUNTER — TELEPHONE (OUTPATIENT)
Dept: ANTICOAGULATION | Facility: CLINIC | Age: 57
End: 2022-01-26

## 2022-01-26 VITALS
SYSTOLIC BLOOD PRESSURE: 162 MMHG | BODY MASS INDEX: 24.92 KG/M2 | DIASTOLIC BLOOD PRESSURE: 102 MMHG | OXYGEN SATURATION: 96 % | HEART RATE: 103 BPM | WEIGHT: 146 LBS | HEIGHT: 64 IN

## 2022-01-26 VITALS
WEIGHT: 147.05 LBS | HEIGHT: 64 IN | SYSTOLIC BLOOD PRESSURE: 152 MMHG | RESPIRATION RATE: 14 BRPM | OXYGEN SATURATION: 95 % | DIASTOLIC BLOOD PRESSURE: 87 MMHG | TEMPERATURE: 97.6 F | HEART RATE: 67 BPM | BODY MASS INDEX: 25.1 KG/M2

## 2022-01-26 DIAGNOSIS — I48.91 ATRIAL FIBRILLATION, UNSPECIFIED TYPE (H): Primary | ICD-10-CM

## 2022-01-26 DIAGNOSIS — I48.91 ATRIAL FIBRILLATION, UNSPECIFIED TYPE (H): ICD-10-CM

## 2022-01-26 DIAGNOSIS — Z00.6 EXAMINATION OF PARTICIPANT OR CONTROL IN CLINICAL RESEARCH: Primary | ICD-10-CM

## 2022-01-26 LAB
APPEARANCE FLD: ABNORMAL
CMV DNA SPEC NAA+PROBE-ACNC: NOT DETECTED IU/ML
COLOR FLD: COLORLESS
GLUCOSE BLDC GLUCOMTR-MCNC: 99 MG/DL (ref 70–99)
GRAM STAIN RESULT: NORMAL
GRAM STAIN RESULT: NORMAL
LYMPHOCYTES NFR FLD MANUAL: 2 %
Lab: NORMAL
MONOS+MACROS NFR FLD MANUAL: NORMAL %
NEUTS BAND NFR FLD MANUAL: 4 %
OTHER CELLS FLD MANUAL: 95 %
PATH REPORT.COMMENTS IMP SPEC: NORMAL
PATH REPORT.COMMENTS IMP SPEC: NORMAL
PATH REPORT.FINAL DX SPEC: NORMAL
PATH REPORT.GROSS SPEC: NORMAL
PATH REPORT.RELEVANT HX SPEC: NORMAL
PERFORMING LABORATORY: NORMAL
SPECIMEN STATUS: NORMAL
TEST NAME: NORMAL
WBC # FLD AUTO: 151 /UL

## 2022-01-26 PROCEDURE — 88350 IMFLUOR EA ADDL 1ANTB STN PX: CPT | Mod: TC | Performed by: INTERNAL MEDICINE

## 2022-01-26 PROCEDURE — 87116 MYCOBACTERIA CULTURE: CPT | Performed by: INTERNAL MEDICINE

## 2022-01-26 PROCEDURE — 31624 DX BRONCHOSCOPE/LAVAGE: CPT | Mod: GC | Performed by: INTERNAL MEDICINE

## 2022-01-26 PROCEDURE — 250N000011 HC RX IP 250 OP 636: Performed by: INTERNAL MEDICINE

## 2022-01-26 PROCEDURE — 31628 BRONCHOSCOPY/LUNG BX EACH: CPT

## 2022-01-26 PROCEDURE — 89051 BODY FLUID CELL COUNT: CPT | Performed by: INTERNAL MEDICINE

## 2022-01-26 PROCEDURE — 88312 SPECIAL STAINS GROUP 1: CPT | Mod: 26 | Performed by: PATHOLOGY

## 2022-01-26 PROCEDURE — 87102 FUNGUS ISOLATION CULTURE: CPT | Mod: 91 | Performed by: INTERNAL MEDICINE

## 2022-01-26 PROCEDURE — G0463 HOSPITAL OUTPT CLINIC VISIT: HCPCS | Mod: 25

## 2022-01-26 PROCEDURE — 99215 OFFICE O/P EST HI 40 MIN: CPT | Performed by: INTERNAL MEDICINE

## 2022-01-26 PROCEDURE — 87205 SMEAR GRAM STAIN: CPT | Performed by: INTERNAL MEDICINE

## 2022-01-26 PROCEDURE — 31628 BRONCHOSCOPY/LUNG BX EACH: CPT | Mod: GC | Performed by: INTERNAL MEDICINE

## 2022-01-26 PROCEDURE — 31624 DX BRONCHOSCOPE/LAVAGE: CPT | Performed by: INTERNAL MEDICINE

## 2022-01-26 PROCEDURE — 31632 BRONCHOSCOPY/LUNG BX ADDL: CPT | Mod: GC | Performed by: INTERNAL MEDICINE

## 2022-01-26 PROCEDURE — 87102 FUNGUS ISOLATION CULTURE: CPT | Performed by: INTERNAL MEDICINE

## 2022-01-26 PROCEDURE — 88312 SPECIAL STAINS GROUP 1: CPT | Mod: TC | Performed by: INTERNAL MEDICINE

## 2022-01-26 PROCEDURE — 71046 X-RAY EXAM CHEST 2 VIEWS: CPT | Mod: 26 | Performed by: RADIOLOGY

## 2022-01-26 PROCEDURE — 99152 MOD SED SAME PHYS/QHP 5/>YRS: CPT | Mod: GC | Performed by: INTERNAL MEDICINE

## 2022-01-26 PROCEDURE — 250N000009 HC RX 250: Performed by: INTERNAL MEDICINE

## 2022-01-26 PROCEDURE — 87070 CULTURE OTHR SPECIMN AEROBIC: CPT | Performed by: INTERNAL MEDICINE

## 2022-01-26 PROCEDURE — 87486 CHLMYD PNEUM DNA AMP PROBE: CPT | Performed by: INTERNAL MEDICINE

## 2022-01-26 PROCEDURE — 82962 GLUCOSE BLOOD TEST: CPT

## 2022-01-26 PROCEDURE — 999N000065 XR CHEST 2 VW

## 2022-01-26 PROCEDURE — 87798 DETECT AGENT NOS DNA AMP: CPT | Performed by: INTERNAL MEDICINE

## 2022-01-26 PROCEDURE — 88346 IMFLUOR 1ST 1ANTB STAIN PX: CPT | Mod: 26 | Performed by: PATHOLOGY

## 2022-01-26 PROCEDURE — 84999 UNLISTED CHEMISTRY PROCEDURE: CPT | Performed by: INTERNAL MEDICINE

## 2022-01-26 PROCEDURE — G0500 MOD SEDAT ENDO SERVICE >5YRS: HCPCS | Performed by: INTERNAL MEDICINE

## 2022-01-26 PROCEDURE — 99153 MOD SED SAME PHYS/QHP EA: CPT | Performed by: INTERNAL MEDICINE

## 2022-01-26 PROCEDURE — 87581 M.PNEUMON DNA AMP PROBE: CPT | Performed by: INTERNAL MEDICINE

## 2022-01-26 PROCEDURE — 88108 CYTOPATH CONCENTRATE TECH: CPT | Mod: 26 | Performed by: PATHOLOGY

## 2022-01-26 PROCEDURE — 88307 TISSUE EXAM BY PATHOLOGIST: CPT | Mod: 26 | Performed by: PATHOLOGY

## 2022-01-26 PROCEDURE — 87633 RESP VIRUS 12-25 TARGETS: CPT | Performed by: INTERNAL MEDICINE

## 2022-01-26 RX ORDER — LIDOCAINE HYDROCHLORIDE 40 MG/ML
INJECTION, SOLUTION RETROBULBAR PRN
Status: COMPLETED | OUTPATIENT
Start: 2022-01-26 | End: 2022-01-26

## 2022-01-26 RX ORDER — FENTANYL CITRATE 50 UG/ML
INJECTION, SOLUTION INTRAMUSCULAR; INTRAVENOUS PRN
Status: COMPLETED | OUTPATIENT
Start: 2022-01-26 | End: 2022-01-26

## 2022-01-26 RX ORDER — HEPARIN SODIUM,PORCINE 10 UNIT/ML
5 VIAL (ML) INTRAVENOUS
Status: DISCONTINUED | OUTPATIENT
Start: 2022-01-26 | End: 2022-01-26 | Stop reason: HOSPADM

## 2022-01-26 RX ORDER — LIDOCAINE HYDROCHLORIDE 20 MG/ML
SOLUTION OROPHARYNGEAL PRN
Status: COMPLETED | OUTPATIENT
Start: 2022-01-26 | End: 2022-01-26

## 2022-01-26 RX ORDER — LIDOCAINE HYDROCHLORIDE 10 MG/ML
INJECTION, SOLUTION INFILTRATION; PERINEURAL PRN
Status: COMPLETED | OUTPATIENT
Start: 2022-01-26 | End: 2022-01-26

## 2022-01-26 RX ORDER — MAGNESIUM HYDROXIDE 1200 MG/15ML
LIQUID ORAL PRN
Status: COMPLETED | OUTPATIENT
Start: 2022-01-26 | End: 2022-01-26

## 2022-01-26 RX ORDER — LIDOCAINE HYDROCHLORIDE AND EPINEPHRINE 10; 10 MG/ML; UG/ML
INJECTION, SOLUTION INFILTRATION; PERINEURAL PRN
Status: COMPLETED | OUTPATIENT
Start: 2022-01-26 | End: 2022-01-26

## 2022-01-26 RX ADMIN — FENTANYL CITRATE 50 MCG: 50 INJECTION, SOLUTION INTRAMUSCULAR; INTRAVENOUS at 08:26

## 2022-01-26 RX ADMIN — TOPICAL ANESTHETIC 1 SPRAY: 200 SPRAY DENTAL; PERIODONTAL at 08:22

## 2022-01-26 RX ADMIN — LIDOCAINE HYDROCHLORIDE AND EPINEPHRINE 5 ML: 10; 10 INJECTION, SOLUTION INFILTRATION; PERINEURAL at 08:36

## 2022-01-26 RX ADMIN — MIDAZOLAM 1 MG: 1 INJECTION INTRAMUSCULAR; INTRAVENOUS at 08:22

## 2022-01-26 RX ADMIN — FENTANYL CITRATE 100 MCG: 50 INJECTION, SOLUTION INTRAMUSCULAR; INTRAVENOUS at 08:22

## 2022-01-26 RX ADMIN — SODIUM CHLORIDE 120 ML: 900 IRRIGANT IRRIGATION at 08:29

## 2022-01-26 RX ADMIN — LIDOCAINE HYDROCHLORIDE AND EPINEPHRINE 5 ML: 10; 10 INJECTION, SOLUTION INFILTRATION; PERINEURAL at 08:34

## 2022-01-26 RX ADMIN — LIDOCAINE HYDROCHLORIDE 10 ML: 20 SOLUTION ORAL; TOPICAL at 08:21

## 2022-01-26 RX ADMIN — MIDAZOLAM 1 MG: 1 INJECTION INTRAMUSCULAR; INTRAVENOUS at 08:26

## 2022-01-26 RX ADMIN — LIDOCAINE HYDROCHLORIDE 12 ML: 10 INJECTION, SOLUTION INFILTRATION; PERINEURAL at 08:27

## 2022-01-26 RX ADMIN — LIDOCAINE HYDROCHLORIDE 9 ML: 40 INJECTION, SOLUTION RETROBULBAR; TOPICAL at 08:26

## 2022-01-26 RX ADMIN — LIDOCAINE HYDROCHLORIDE AND EPINEPHRINE 5 ML: 10; 10 INJECTION, SOLUTION INFILTRATION; PERINEURAL at 08:45

## 2022-01-26 ASSESSMENT — PAIN SCALES - GENERAL: PAINLEVEL: MILD PAIN (3)

## 2022-01-26 ASSESSMENT — MIFFLIN-ST. JEOR
SCORE: 1408
SCORE: 1406

## 2022-01-26 NOTE — NURSING NOTE
Chief Complaint   Patient presents with     New Patient     new ep     Vitals were taken and medications reconciled.    Vimal Chavarria, EMT  3:29 PM

## 2022-01-26 NOTE — PROGRESS NOTES
HPI:   Bret is a pleasant 56-year-old male who was kindly referred by Dr. Abiodun Woods regarding the atrial fibrillation episodes that occurred last fall postoperatively.  Patient had received lung transplant and apparently had a very complex subsequent course with prolonged hospitalization.  He had already been started on warfarin (patient says since last summer), and the basis for continuing is to be determined.    At today's visit patient does not indicate any symptomatic cardiac rhythm disturbance.  He does recollect that he was in hospital and has little recollection of occurred prior to his discharge.  In any case, he is current rhythm status is being evaluated by a Zio patch in situ.  I do not have the results of that recording.  Consequently it is unclear whether Bret is continuing to have episodes of atrial fibrillation or not.    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  He is in a wheelchair today.  He on the other hand does not have any new cardiac symptoms and denies any excessive breathlessness, heart failure related symptoms or peripheral edema.    In discussion today I indicated to the patient that we would follow the results of the Zio patch and then advise the pulmonary regarding an opinion related to prolonged anticoagulation or need for antiarrhythmic strategy.  I indicated to him that many patients with atrial fibrillation, whether paroxysmal or permanent, may be asymptomatic and only cardiac monitoring.  Be helpful in assessing atrial fibrillation burden.  He voiced understanding and agreement with the plan..    PAST MEDICAL HISTORY:  Past Medical History:   Diagnosis Date     BRENNAN (acute kidney injury) (H) 10/17/2021     Anxiety      Depression      HLD (hyperlipidemia)      HTN (hypertension)      Hypothyroidism      ILD (interstitial lung disease) (H)      SALTY on CPAP      Oxygen dependent     BL 4L since  ~6/2021     Rheumatoid arthritis (H)     signs ~5/2020, dx 5/2021     S/P lung transplant (H) 10/16/2021     Shock liver 10/17/2021     Steroid-induced hyperglycemia      Traction bronchiectasis (H)        CURRENT MEDICATIONS:  Current Outpatient Medications   Medication Sig Dispense Refill     acetaminophen (TYLENOL) 325 MG tablet 3 tablets (975 mg) by Oral or Feeding Tube route every 8 hours as needed for mild pain       amLODIPine (NORVASC) 5 MG tablet Take 1 tablet (5 mg) by mouth daily 30 tablet 11     calcium carbonate 600 mg-vitamin D 400 units (CALTRATE) 600-400 MG-UNIT per tablet 1 tablet by Oral or Feeding Tube route 2 times daily (with meals) 60 tablet 11     escitalopram (LEXAPRO) 20 MG tablet Take 1 tablet (20 mg) by mouth daily 30 tablet 11     fluticasone (FLONASE) 50 MCG/ACT nasal spray Spray 1 spray into both nostrils daily 16 g 11     folic acid-vit B6-vit B12 (FOLGARD) 0.8-10-0.115 MG TABS per tablet Take 1 tablet by mouth daily 30 tablet 11     insulin glargine (LANTUS PEN) 100 UNIT/ML pen Inject 3 Units Subcutaneous every morning 15 mL 0     levalbuterol (XOPENEX HFA) 45 MCG/ACT inhaler Inhale 2 puffs into the lungs every 4 hours as needed for wheezing or shortness of breath / dyspnea 15 g 0     levothyroxine (SYNTHROID/LEVOTHROID) 25 MCG tablet Take 1 tablet (25 mcg) by mouth daily 30 tablet 11     magnesium oxide (MAG-OX) 400 MG tablet Take 3 tablets (1,200 mg) by mouth 2 times daily 120 tablet 11     Melatonin 10 MG TABS tablet Take 1 tablet (10 mg) by mouth nightly as needed for sleep 30 tablet 3     multivitamin w/minerals (THERA-VIT-M) tablet Take 1 tablet by mouth daily 30 tablet 11     mycophenolate (GENERIC EQUIVALENT) 250 MG capsule Take 5 capsules (1,250 mg) by mouth 2 times daily 300 capsule 11     nystatin (MYCOSTATIN) 032141 UNIT/ML suspension Swish and swallow 10 mLs (1,000,000 Units) in mouth 4 times daily 1500 mL 4     ondansetron (ZOFRAN-ODT) 4 MG ODT tab Take 1 tablet (4 mg)  by mouth every 6 hours as needed for nausea 30 tablet 3     pantoprazole (PROTONIX) 40 MG EC tablet Take 1 tablet (40 mg) by mouth 2 times daily (before meals) 60 tablet 0     predniSONE (DELTASONE) 5 MG tablet Take 10 mg in am and 7.5 mg (1 and a half tabs) in afternoon. Then on 1/30 decrease dose to 7.5mg in the AM and 7.5mg in the PM. Further taper as outlined by transplant team. 150 tablet 3     rosuvastatin (CRESTOR) 10 MG tablet Take 1 tablet (10 mg) by mouth daily 30 tablet 11     sulfamethoxazole-trimethoprim (BACTRIM) 400-80 MG tablet Take 1 tablet by mouth daily 30 tablet 11     tacrolimus (GENERIC EQUIVALENT) 0.5 MG capsule Take 1 capsule (0.5 mg) by mouth 2 times daily Total dose: 1.5mg in the AM and 1.5mg in the PM 60 capsule 11     tacrolimus (GENERIC EQUIVALENT) 1 MG capsule Take 1 capsule (1 mg) by mouth 2 times daily Total dose 1.5 mg in the AM and 1.5 mg in the PM 60 capsule 11     warfarin ANTICOAGULANT (COUMADIN) 1 MG tablet Take 2 tablets (2 mg) by mouth every evening Further dosing per anticoagulation clinic (Patient taking differently: Take 0.5-1 mg by mouth every evening Further dosing per anticoagulation clinic) 90 tablet 0     enoxaparin ANTICOAGULANT (LOVENOX) 60 MG/0.6ML syringe Inject 0.6 mLs (60 mg) Subcutaneous 2 times daily (Patient not taking: Reported on 1/26/2022) 12 mL 1     valACYclovir (VALTREX) 1000 mg tablet Take 1 tablet (1,000 mg) by mouth 2 times daily for 7 days (Patient not taking: Reported on 1/26/2022) 14 tablet 0       PAST SURGICAL HISTORY:  Past Surgical History:   Procedure Laterality Date     COLONOSCOPY W/ BIOPSIES AND POLYPECTOMY  07/21/2020     CV CORONARY ANGIOGRAM N/A 09/08/2021    Procedure: Coronary Angiogram with possible intervention;  Surgeon: Jovon Bullock MD;  Location: [facility] CARDIAC CATH LAB     CV RIGHT HEART CATH MEASUREMENTS RECORDED N/A 09/08/2021    Procedure: Right Heart Cath;  Surgeon: Jovon Bullock MD;  Location: [facility]  CARDIAC CATH LAB     ESOPHAGOSCOPY, GASTROSCOPY, DUODENOSCOPY (EGD), COMBINED N/A 10/23/2021    Procedure: ESOPHAGOGASTRODUODENOSCOPY (EGD);  Surgeon: Miquel Pisano MD;  Location: [facility] GI     ESOPHAGOSCOPY, GASTROSCOPY, DUODENOSCOPY (EGD), COMBINED N/A 11/02/2021    Procedure: ESOPHAGOGASTRODUODENOSCOPY (EGD);  Surgeon: Daniel Ortiz MD;  Location: [facility] GI     ESOPHAGOSCOPY, GASTROSCOPY, DUODENOSCOPY (EGD), COMBINED N/A 11/5/2021    Procedure: ESOPHAGOGASTRODUODENOSCOPY (EGD);  Surgeon: Ronnell Hernandez MD;  Location: [facility] GI     EXTRACTION(S) DENTAL N/A 09/22/2021    Procedure: EXTRACTION tooth #19;  Surgeon: Deepak Tobin DDS;  Location: [facility] OR     HERNIA REPAIR       IR CHEST TUBE PLACEMENT NON-TUNNELLED LEFT  11/03/2021     PICC TRIPLE LUMEN PLACEMENT Left 11/04/2021    5FR TL PICC. Right non occlusive thrombus subclavian vein.     REPLACE GASTROJEJUNOSTOMY TUBE, PERCUTANEOUS N/A 11/9/2021    Procedure: REPLACEMENT, GASTROJEJUNOSTOMY TUBE, PERCUTANEOUS;  Surgeon: Hernando Rodriguez MD;  Location: [facility] GI     right acl       TRACHEOSTOMY PERCUTANEOUS N/A 10/29/2021    Procedure: Percutaneous Tracheostomy,;  Surgeon: Celine Jenkins MD;  Location: [facility] OR     TRANSPLANT LUNG RECIPIENT SINGLE X2 Bilateral 10/16/2021    Procedure: TRANSPLANT, LUNG, RECIPIENT, BILATERAL, Bronchoscopy, on-pump perfusion, bilateral clamshell sternotomy;  Surgeon: Yanick Corral MD;  Location: [facility] OR     XR ACROMIOCLAVICULAR JOINT BILATERAL         ALLERGIES:   No Known Allergies    FAMILY HISTORY:  Family History   Problem Relation Age of Onset     Diabetes Type 1 Mother      Heart Disease Mother      Chronic Obstructive Pulmonary Disease Mother      Rheumatoid Arthritis Father      Emphysema Paternal Grandfather         SOCIAL HISTORY:  Social History     Tobacco Use     Smoking status: Never Smoker     Smokeless tobacco: Never Used   Substance Use Topics     Alcohol use: Never     Drug use: Never  "      ROS:   Constitutional: No fever, chills, or sweats. Weight stable.   ENT: No visual disturbance, ear ache, epistaxis, sore throat.   Cardiovascular: As per HPI.   Respiratory: No cough, hemoptysis.    GI: No nausea, vomiting, .   : No hematuria.   Integument: Negative.   Psychiatric: Negative.   Hematologic:  Easy bruising, no easy bleeding.  Neuro: Negative.   Endocrinology: No significant heat or cold intolerance   Musculoskeletal: No myalgia.    Exam:  BP (!) 162/102 (BP Location: Right arm, Patient Position: Chair, Cuff Size: Adult Regular)   Pulse 103   Ht 1.63 m (5' 4.17\")   Wt 66.2 kg (146 lb)   SpO2 96%   BMI 24.93 kg/m    GENERAL APPEARANCE: In a wheelchair, alert and no distress  HEENT: no icterus, , no central cyanosis  NECK: no adenopathy, no asymmetry, masses, or scars, thyroid normal to palpation and no bruits, JVP not elevated  RESPIRATORY: lungs clear to auscultation - no rales, rhonchi or wheezes, no use of accessory muscles, no retractions, respirations are unlabored, normal respiratory rate  CARDIOVASCULAR: regular rhythm, normal S1 with physiologic split S2, no S3 or S4 and no murmur, click or rub, precordium quiet with normal PMI.  ABDOMEN: soft, non tender,   NEURO: alert and oriented to person/place/time, normal speech, gait and affect  SKIN: no ecchymoses, no rashes    Labs:  CBC RESULTS:   Lab Results   Component Value Date    WBC 9.3 01/25/2022    RBC 4.54 01/25/2022    HGB 12.1 (L) 01/25/2022    HCT 38.3 (L) 01/25/2022    MCV 84 01/25/2022    MCH 26.7 01/25/2022    MCHC 31.6 01/25/2022    RDW 15.3 (H) 01/25/2022     01/25/2022       BMP RESULTS:  Lab Results   Component Value Date     01/25/2022    POTASSIUM 4.2 01/25/2022    POTASSIUM 4.8 11/02/2021    CHLORIDE 109 01/25/2022    CO2 28 01/25/2022    ANIONGAP 7 01/25/2022    GLC 99 01/26/2022    GLC 98 01/25/2022    BUN 17 01/25/2022    CR 0.80 01/25/2022    GFRESTIMATED >90 01/25/2022    ERIK 9.5 01/25/2022    "     INR RESULTS:  Lab Results   Component Value Date    INR 1.11 01/25/2022    INR 1.8 (A) 01/21/2022    INR 1.58 (H) 01/19/2022    INR 1.8 (H) 01/17/2022    INR 2.5 (H) 01/14/2022    INR 2.4 (H) 01/12/2022    INR 2.18 (H) 01/12/2022    INR 2.25 (H) 01/10/2022       Procedures:  PULMONARY FUNCTION TESTS:   PFT Latest Ref Rng & Units 1/25/2022   FVC L 2.12   FEV1 L 1.24   FVC% % 53   FEV1% % 39         ECHOCARDIOGRAM:   No results found for this or any previous visit (from the past 8760 hour(s)).      Assessment and Plan:   1.  Status post lung transplant  2.  Postoperative atrial fibrillation-uncertain whether atrial fibrillation continues-currently being monitored with Zio patch  3.  Anticoagulated with warfarin    Plan  1.  Assess Zio patch outcome and assess need for antiarrhythmic therapy/anticoagulation long-term  2.  Follow-up as needed    Total elapsed time today with chart review, clinic visit and documentation 45 minutes    I very much appreciated the opportunity to see and assess Edson Thornton in the clinic . Please do not hesitate to contact my office if you have any questions or concerns.      Sundra Matthews MD  Cardiac Arrhythmia Service  [facility]  [phone]        CC  VANNESA JIMÉNEZ

## 2022-01-26 NOTE — OR NURSING
Procedure: Bronch with R middle lobe BAL and R middle and lower lobe Bx under fluroscopy  Sedation: Conscious sedation   Specimens: Handled by RT  O2: 2-4 LPM NC  Other: 2 view CXR ordered for 1000    Patient tolerated procedure well. Patient stable on transfer to .

## 2022-01-26 NOTE — OR NURSING
Patient is resting comfortably, coughing occasionally with adequate clearing. Patient is awaiting chest x-ray scheduled at 1000. Patient and family aware. Call light is within reach.

## 2022-01-26 NOTE — DISCHARGE INSTRUCTIONS
Flexible Bronchoscopy  A flexible bronchoscopy is an exam of the airways of your lungs. A thin, flexible tube called a bronchoscope is used. It has a light and small camera that let the healthcare provider see your airways.     Before your test    Follow your healthcare provider's instructions carefully. If you don t, the exam may be canceled. Or you may need to take it again.    If you are taking blood-thinning medicine, ask your provider if you should stop taking the medicine before this test.    Follow any directions you are given for not eating or drinking before the test.    Don't smoke for 24 hours before the test.    You will need to remove any dentures or removable devices from your mouth.    Right before the test, you will be given sedating medicines to help you relax. The medicine may be given by an IV (intravenously) into one of your veins. In addition, your nose and throat may be numbed with a special spray to help prevent gagging and coughing.    If you are having this test as an outpatient, make sure you have an adult friend or family member to drive you home.    During your test  Bronchoscopy takes 45 to 60 minutes and includes the following steps:     You may be given medicine (anesthesia) so that you are unconscious or asleep during the procedure.    The healthcare provider puts the tube into your nose or mouth.    If you have not been given anesthesia, you might feel a gagging sensation. To help ease this feeling, you will be told to swallow or take deep breaths. Your airway will remain open even with the tube in place. But you won t be able to talk.    The provider checks your airway. He or she may also remove tiny tissue samples for a biopsy.  After your test    You may have a mild sore throat or cough. Your voice may also be hoarse.    Don't eat or drink until the anesthesia wears off.    If you had a biopsy, you might see traces of blood being coughed up.    When to call your healthcare  provider  Call your healthcare provider right away if you have any of the following:    Shortness of breath    Chest pain    Bleeding from your nose or throat    Coughing up a large amount of blood    A fever above 100.4 F ( 38 C) for more than 24 hours, or as advised by your provider    New symptoms or symptoms that get worse  When to call 911  Call 911 if you have:     Chest pain    Severe shortness of breath or trouble breathing  Marlys last reviewed this educational content on 10/1/2019    5716-0841 [employer]. All rights reserved. This information is not intended as a substitute for professional medical care. Always follow your healthcare professional's instructions.

## 2022-01-26 NOTE — PROGRESS NOTES
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  IRB#[id number]    ICF Version Date 8/27/2021 / IRB Approval Date: 10/11/2021     Date of Consent Confirmation Call: 1/26/2022    The subject was screened and is eligible for this study. The patient meets all of the inclusion criteria and none of the exclusion criteria. This subject was previously mailed a consent form and contacted by the research team. The subject verbally confirmed interest over the phone and mailed back a signed HIPAA form and an informed consent form. This note is documenting that the study team has received the enrollment forms and that the patient was re contacted by the study team and confirmed enrollment over the phone.     The subject was told:  -that the study involves research   -the purpose of the research study  -the expected duration of the study and the approximate number of subject sought  -of procedures that are identified as experimental  -of reasonably foreseeable risks or discomforts to the subject  -of any benefits to the subject or others that may be expected from the research  -of alternative procedures and/or treatment  -how the confidentiality of records would be maintained  -whether or not compensation and medical treatments are available should injury occur as a result of the study  -who to contact if they have questions related to the research study or questions regarding research subjects' rights  -that participation is completely voluntary and that their decision to or not to participate will have no impact on their relationships with the [facility] and the staff    No study procedures were completed prior to the consent being obtained.  The use of historical information (lab or assessments) used for the purpose of the study was approved by subject.  The subject was fully aware that we would be reviewing their medical record for the  study.  The subject demonstrated an understanding of what the study involved.  Specifically, how this study differed from standard of care at our center and what was required of the subject as part of the study.  The subject reviewed the consent form and was given the opportunity to ask questions before signing.  Questions and concerns were answered by the study staff and/or study physician.  A copy of the signed informed consent document was mailed back to the subject:  [x] Yes [] No    Date of mailin2022  The consent require the use of a :   [] Yes [x]   No                             A 'short form' consent was used:     [] Yes [x]  No                                                                                      If yes, provide name of witness:   The subject required a legally-authorized representative (LAR) to sign on their behalf:                                                    [] Yes    [x] No                 If yes, please record name of LAR:     Questions to Evaluate Subject Comprehension of Study:     Question: Adequate Response? If No, explain what actions were taken   What is being studied? [x] Yes   [] No     If you participate, what will be different than if you decide not to participate?  [x] Yes  [] No     How long will the study last; will you be required to return for visits? [] Yes  [x] No   What kinds of risks are there? [x] Yes  [] No       Do you understand that you can withdraw consent at any time and for any reason while participating in the study? [x] Yes  [] No       Study Coordinators:  Reyna Rosenbaum  [phone]    [email or web address]      :      Michael Vargas       [phone]    [email or web address]  PI:  Nae HUTCHINSON      Physical Exam

## 2022-01-26 NOTE — TELEPHONE ENCOUNTER
Mady Marin RN Poehls, Cindy Jolene, PA-C; [facility]; Abiodun Woods MD 5 days ago     AL    I talked to Dr. Woods and he is fine with the plan.     Thanks for checking!     Mady

## 2022-01-26 NOTE — LETTER
1/26/2022      RE: Edson Thornton  [address]  [place] [address]       Dear Colleague,    Thank you for the opportunity to participate in the care of your patient, Edson Thornton, at the [facility] [place] [facility]. Please see a copy of my visit note below.    HPI:   Bret is a pleasant 56-year-old male who was kindly referred by Dr. Abiodun Woods regarding the atrial fibrillation episodes that occurred last fall postoperatively.  Patient had received lung transplant and apparently had a very complex subsequent course with prolonged hospitalization.  He had already been started on warfarin (patient says since last summer), and the basis for continuing is to be determined.    At today's visit patient does not indicate any symptomatic cardiac rhythm disturbance.  He does recollect that he was in hospital and has little recollection of occurred prior to his discharge.  In any case, he is current rhythm status is being evaluated by a Zio patch in situ.  I do not have the results of that recording.  Consequently it is unclear whether Bret is continuing to have episodes of atrial fibrillation or not.    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  He is in a wheelchair today.  He on the other hand does not have any new cardiac symptoms and denies any excessive breathlessness, heart failure related symptoms or peripheral edema.    In discussion today I indicated to the patient that we would follow the results of the Zio patch and then advise the pulmonary regarding an opinion related to prolonged anticoagulation or need for antiarrhythmic strategy.  I indicated to him that many patients with atrial fibrillation, whether paroxysmal or permanent, may be asymptomatic and only cardiac monitoring.  Be helpful in assessing atrial fibrillation burden.  He voiced  understanding and agreement with the plan..    PAST MEDICAL HISTORY:  Past Medical History:   Diagnosis Date     BRENNAN (acute kidney injury) (H) 10/17/2021     Anxiety      Depression      HLD (hyperlipidemia)      HTN (hypertension)      Hypothyroidism      ILD (interstitial lung disease) (H)      SALTY on CPAP      Oxygen dependent     BL 4L since ~6/2021     Rheumatoid arthritis (H)     signs ~5/2020, dx 5/2021     S/P lung transplant (H) 10/16/2021     Shock liver 10/17/2021     Steroid-induced hyperglycemia      Traction bronchiectasis (H)        CURRENT MEDICATIONS:  Current Outpatient Medications   Medication Sig Dispense Refill     acetaminophen (TYLENOL) 325 MG tablet 3 tablets (975 mg) by Oral or Feeding Tube route every 8 hours as needed for mild pain       amLODIPine (NORVASC) 5 MG tablet Take 1 tablet (5 mg) by mouth daily 30 tablet 11     calcium carbonate 600 mg-vitamin D 400 units (CALTRATE) 600-400 MG-UNIT per tablet 1 tablet by Oral or Feeding Tube route 2 times daily (with meals) 60 tablet 11     escitalopram (LEXAPRO) 20 MG tablet Take 1 tablet (20 mg) by mouth daily 30 tablet 11     fluticasone (FLONASE) 50 MCG/ACT nasal spray Spray 1 spray into both nostrils daily 16 g 11     folic acid-vit B6-vit B12 (FOLGARD) 0.8-10-0.115 MG TABS per tablet Take 1 tablet by mouth daily 30 tablet 11     insulin glargine (LANTUS PEN) 100 UNIT/ML pen Inject 3 Units Subcutaneous every morning 15 mL 0     levalbuterol (XOPENEX HFA) 45 MCG/ACT inhaler Inhale 2 puffs into the lungs every 4 hours as needed for wheezing or shortness of breath / dyspnea 15 g 0     levothyroxine (SYNTHROID/LEVOTHROID) 25 MCG tablet Take 1 tablet (25 mcg) by mouth daily 30 tablet 11     magnesium oxide (MAG-OX) 400 MG tablet Take 3 tablets (1,200 mg) by mouth 2 times daily 120 tablet 11     Melatonin 10 MG TABS tablet Take 1 tablet (10 mg) by mouth nightly as needed for sleep 30 tablet 3     multivitamin w/minerals (THERA-VIT-M) tablet Take  1 tablet by mouth daily 30 tablet 11     mycophenolate (GENERIC EQUIVALENT) 250 MG capsule Take 5 capsules (1,250 mg) by mouth 2 times daily 300 capsule 11     nystatin (MYCOSTATIN) 412659 UNIT/ML suspension Swish and swallow 10 mLs (1,000,000 Units) in mouth 4 times daily 1500 mL 4     ondansetron (ZOFRAN-ODT) 4 MG ODT tab Take 1 tablet (4 mg) by mouth every 6 hours as needed for nausea 30 tablet 3     pantoprazole (PROTONIX) 40 MG EC tablet Take 1 tablet (40 mg) by mouth 2 times daily (before meals) 60 tablet 0     predniSONE (DELTASONE) 5 MG tablet Take 10 mg in am and 7.5 mg (1 and a half tabs) in afternoon. Then on 1/30 decrease dose to 7.5mg in the AM and 7.5mg in the PM. Further taper as outlined by transplant team. 150 tablet 3     rosuvastatin (CRESTOR) 10 MG tablet Take 1 tablet (10 mg) by mouth daily 30 tablet 11     sulfamethoxazole-trimethoprim (BACTRIM) 400-80 MG tablet Take 1 tablet by mouth daily 30 tablet 11     tacrolimus (GENERIC EQUIVALENT) 0.5 MG capsule Take 1 capsule (0.5 mg) by mouth 2 times daily Total dose: 1.5mg in the AM and 1.5mg in the PM 60 capsule 11     tacrolimus (GENERIC EQUIVALENT) 1 MG capsule Take 1 capsule (1 mg) by mouth 2 times daily Total dose 1.5 mg in the AM and 1.5 mg in the PM 60 capsule 11     warfarin ANTICOAGULANT (COUMADIN) 1 MG tablet Take 2 tablets (2 mg) by mouth every evening Further dosing per anticoagulation clinic (Patient taking differently: Take 0.5-1 mg by mouth every evening Further dosing per anticoagulation clinic) 90 tablet 0     enoxaparin ANTICOAGULANT (LOVENOX) 60 MG/0.6ML syringe Inject 0.6 mLs (60 mg) Subcutaneous 2 times daily (Patient not taking: Reported on 1/26/2022) 12 mL 1     valACYclovir (VALTREX) 1000 mg tablet Take 1 tablet (1,000 mg) by mouth 2 times daily for 7 days (Patient not taking: Reported on 1/26/2022) 14 tablet 0       PAST SURGICAL HISTORY:  Past Surgical History:   Procedure Laterality Date     COLONOSCOPY W/ BIOPSIES AND  POLYPECTOMY  07/21/2020     CV CORONARY ANGIOGRAM N/A 09/08/2021    Procedure: Coronary Angiogram with possible intervention;  Surgeon: Jovon Bullock MD;  Location:  HEART CARDIAC CATH LAB     CV RIGHT HEART CATH MEASUREMENTS RECORDED N/A 09/08/2021    Procedure: Right Heart Cath;  Surgeon: Jovon Bullock MD;  Location:  HEART CARDIAC CATH LAB     ESOPHAGOSCOPY, GASTROSCOPY, DUODENOSCOPY (EGD), COMBINED N/A 10/23/2021    Procedure: ESOPHAGOGASTRODUODENOSCOPY (EGD);  Surgeon: Miquel Pisano MD;  Location:  GI     ESOPHAGOSCOPY, GASTROSCOPY, DUODENOSCOPY (EGD), COMBINED N/A 11/02/2021    Procedure: ESOPHAGOGASTRODUODENOSCOPY (EGD);  Surgeon: Daniel Ortiz MD;  Location:  GI     ESOPHAGOSCOPY, GASTROSCOPY, DUODENOSCOPY (EGD), COMBINED N/A 11/5/2021    Procedure: ESOPHAGOGASTRODUODENOSCOPY (EGD);  Surgeon: Ronnell Hernandez MD;  Location:  GI     EXTRACTION(S) DENTAL N/A 09/22/2021    Procedure: EXTRACTION tooth #19;  Surgeon: Deepak Tobin DDS;  Location: [facility] OR     HERNIA REPAIR       IR CHEST TUBE PLACEMENT NON-TUNNELLED LEFT  11/03/2021     PICC TRIPLE LUMEN PLACEMENT Left 11/04/2021    5FR TL PICC. Right non occlusive thrombus subclavian vein.     REPLACE GASTROJEJUNOSTOMY TUBE, PERCUTANEOUS N/A 11/9/2021    Procedure: REPLACEMENT, GASTROJEJUNOSTOMY TUBE, PERCUTANEOUS;  Surgeon: Hernando Rodriguez MD;  Location: [facility] GI     right acl       TRACHEOSTOMY PERCUTANEOUS N/A 10/29/2021    Procedure: Percutaneous Tracheostomy,;  Surgeon: Celine Jenkins MD;  Location: [facility] OR     TRANSPLANT LUNG RECIPIENT SINGLE X2 Bilateral 10/16/2021    Procedure: TRANSPLANT, LUNG, RECIPIENT, BILATERAL, Bronchoscopy, on-pump perfusion, bilateral clamshell sternotomy;  Surgeon: Yanick Corral MD;  Location: [facility] OR     XR ACROMIOCLAVICULAR JOINT BILATERAL         ALLERGIES:   No Known Allergies    FAMILY HISTORY:  Family History   Problem Relation Age of Onset     Diabetes  "Type 1 Mother      Heart Disease Mother      Chronic Obstructive Pulmonary Disease Mother      Rheumatoid Arthritis Father      Emphysema Paternal Grandfather         SOCIAL HISTORY:  Social History     Tobacco Use     Smoking status: Never Smoker     Smokeless tobacco: Never Used   Substance Use Topics     Alcohol use: Never     Drug use: Never       ROS:   Constitutional: No fever, chills, or sweats. Weight stable.   ENT: No visual disturbance, ear ache, epistaxis, sore throat.   Cardiovascular: As per HPI.   Respiratory: No cough, hemoptysis.    GI: No nausea, vomiting, .   : No hematuria.   Integument: Negative.   Psychiatric: Negative.   Hematologic:  Easy bruising, no easy bleeding.  Neuro: Negative.   Endocrinology: No significant heat or cold intolerance   Musculoskeletal: No myalgia.    Exam:  BP (!) 162/102 (BP Location: Right arm, Patient Position: Chair, Cuff Size: Adult Regular)   Pulse 103   Ht 1.63 m (5' 4.17\")   Wt 66.2 kg (146 lb)   SpO2 96%   BMI 24.93 kg/m    GENERAL APPEARANCE: In a wheelchair, alert and no distress  HEENT: no icterus, , no central cyanosis  NECK: no adenopathy, no asymmetry, masses, or scars, thyroid normal to palpation and no bruits, JVP not elevated  RESPIRATORY: lungs clear to auscultation - no rales, rhonchi or wheezes, no use of accessory muscles, no retractions, respirations are unlabored, normal respiratory rate  CARDIOVASCULAR: regular rhythm, normal S1 with physiologic split S2, no S3 or S4 and no murmur, click or rub, precordium quiet with normal PMI.  ABDOMEN: soft, non tender,   NEURO: alert and oriented to person/place/time, normal speech, gait and affect  SKIN: no ecchymoses, no rashes    Labs:  CBC RESULTS:   Lab Results   Component Value Date    WBC 9.3 01/25/2022    RBC 4.54 01/25/2022    HGB 12.1 (L) 01/25/2022    HCT 38.3 (L) 01/25/2022    MCV 84 01/25/2022    MCH 26.7 01/25/2022    MCHC 31.6 01/25/2022    RDW 15.3 (H) 01/25/2022     01/25/2022 "       BMP RESULTS:  Lab Results   Component Value Date     01/25/2022    POTASSIUM 4.2 01/25/2022    POTASSIUM 4.8 11/02/2021    CHLORIDE 109 01/25/2022    CO2 28 01/25/2022    ANIONGAP 7 01/25/2022    GLC 99 01/26/2022    GLC 98 01/25/2022    BUN 17 01/25/2022    CR 0.80 01/25/2022    GFRESTIMATED >90 01/25/2022    ERIK 9.5 01/25/2022        INR RESULTS:  Lab Results   Component Value Date    INR 1.11 01/25/2022    INR 1.8 (A) 01/21/2022    INR 1.58 (H) 01/19/2022    INR 1.8 (H) 01/17/2022    INR 2.5 (H) 01/14/2022    INR 2.4 (H) 01/12/2022    INR 2.18 (H) 01/12/2022    INR 2.25 (H) 01/10/2022       Procedures:  PULMONARY FUNCTION TESTS:   PFT Latest Ref Rng & Units 1/25/2022   FVC L 2.12   FEV1 L 1.24   FVC% % 53   FEV1% % 39         ECHOCARDIOGRAM:   No results found for this or any previous visit (from the past 8760 hour(s)).      Assessment and Plan:   1.  Status post lung transplant  2.  Postoperative atrial fibrillation-uncertain whether atrial fibrillation continues-currently being monitored with Zio patch  3.  Anticoagulated with warfarin    Plan  1.  Assess Zio patch outcome and assess need for antiarrhythmic therapy/anticoagulation long-term  2.  Follow-up as needed    Total elapsed time today with chart review, clinic visit and documentation 45 minutes    I very much appreciated the opportunity to see and assess Edson Thornton in the clinic . Please do not hesitate to contact my office if you have any questions or concerns.      Sundar Matthews MD  Cardiac Arrhythmia Service  [facility]  [phone]        CC  VANNESA JIMÉNEZ

## 2022-01-26 NOTE — TELEPHONE ENCOUNTER
"Spoke to pt at 1135    Pt s/p double lung transplant in October-- pt in sedated naty til thanksgiving and after waking up pt states right eye blind spot in vision covering center of vision and binocular diplopia    Pt covers either eye and diplopia goes away-- angle diplopia per pt and seems worse in AM after waking up    Pt has not seen eye provider since vision change.    Scheduled with Dr. Elizondo february 7th at [facility] location    Pt aware of date/time/location/duration    Note to facilitator    Miguel Roche RN 11:51 AM 01/26/22              Edson Thornton \"[place]\" [phone]      Left message with direct number at [phone]    Miguel Roche RN 11:30 AM 01/26/22        [facility] Call Center    Phone Message    May a detailed message be left on voicemail: no     Reason for Call: Appointment Intake    Referring Provider Name: JESSY VANNESA AMADO  Diagnosis and/or Symptoms: Diplopia    Additional Information: Diplopia and blind spot after lung transplant      Sending to clinic for review due to blind spots.        Action Taken: Other: Eye    Travel Screening: Not Applicable                                                                        "

## 2022-01-26 NOTE — PATIENT INSTRUCTIONS
You were seen in the Electrophysiology Clinic today by: Dr. Matthews    Plan:     Medication Changes: none      Labs/Tests Needed: we will review ziopatch results and make recommendations      Follow up visit: none - unless ziopatch shows something abnormal      Further Instructions: none      Your Care Team:  EP Cardiology   Telephone Number     Nurse Line  Rosita Dewitt RN  [phone]     For scheduling appts or procedures:    Medina Mora   [phone]   For the Device Clinic (Pacemakers, ICDs, Loop Recorders)    During business hours: [phone]  After business hours:   [phone]- select option 4 and ask for job code [id number].     On-call cardiologist for after hours or on weekends: [phone], option #4, and ask to speak to the on-call cardiologist.     Cardiovascular Clinic:   [address] [place] [address]. [place] [address]      As always, Thank you for trusting us with your health care needs!

## 2022-01-26 NOTE — TELEPHONE ENCOUNTER
ANTICOAGULATION  MANAGEMENT: Discharge Review    Edson Thornton chart reviewed for anticoagulation continuity of care    Outpatient surgery/procedure on 1/26/22 for Bronchoscopy.    Discharge disposition: Staying locally at [facility]    Results:    Recent labs: (last 7 days)     01/21/22  0000 01/25/22  0954   INR 1.8* 1.11     Anticoagulation inpatient management:     held warfarin due to Bronchoscopy     Anticoagulation discharge instructions:     Warfarin dosing: increase dose to 3mg one time today and Next INR 1/28/22   Bridging: bridging with enoxaparin (Lovenox)   Recommend resuming Lovenox per Dr. Woods tomorrow morning.  24 hours post procedure.         INR goal change: No      Medication changes affecting anticoagulation: No    Additional factors affecting anticoagulation: Yes: Risk of bleeding/clotting post procedure.    Plan         Post-Procedure:  ? Resume warfarin dose if okay with provider doing procedure on night of procedure, 1/26 PM: 3 mg x 1 then resume current dose     ? Resume bridging ~ 24 hours post procedure (prophyalxis dose); 48-72 hours post procedure (therapeutic) as directed by provider performing procedure.           Recommend to check INR on 1/28/22  Recommend to adjust dose to 3mg boost one time today    Spoke with Woody    Anticoagulation Calendar updated    Nick Hunter, RN

## 2022-01-27 ENCOUNTER — MEDICAL CORRESPONDENCE (OUTPATIENT)
Dept: HEALTH INFORMATION MANAGEMENT | Facility: CLINIC | Age: 57
End: 2022-01-27

## 2022-01-27 ENCOUNTER — TELEPHONE (OUTPATIENT)
Dept: TRANSPLANT | Facility: CLINIC | Age: 57
End: 2022-01-27

## 2022-01-27 ENCOUNTER — LAB REQUISITION (OUTPATIENT)
Dept: LAB | Facility: CLINIC | Age: 57
End: 2022-01-27
Payer: COMMERCIAL

## 2022-01-27 ENCOUNTER — ANCILLARY PROCEDURE (OUTPATIENT)
Dept: GENERAL RADIOLOGY | Facility: CLINIC | Age: 57
End: 2022-01-27
Attending: INTERNAL MEDICINE
Payer: COMMERCIAL

## 2022-01-27 DIAGNOSIS — E43 UNSPECIFIED SEVERE PROTEIN-CALORIE MALNUTRITION (H): ICD-10-CM

## 2022-01-27 DIAGNOSIS — Z94.2 S/P LUNG TRANSPLANT (H): Chronic | ICD-10-CM

## 2022-01-27 LAB
ANION GAP SERPL CALCULATED.3IONS-SCNC: 7 MMOL/L (ref 3–14)
BASOPHILS # BLD AUTO: 0 10E3/UL (ref 0–0.2)
BASOPHILS NFR BLD AUTO: 0 %
BUN SERPL-MCNC: 12 MG/DL (ref 7–30)
CALCIUM SERPL-MCNC: 9.5 MG/DL (ref 8.5–10.1)
CHLORIDE BLD-SCNC: 109 MMOL/L (ref 94–109)
CO2 SERPL-SCNC: 27 MMOL/L (ref 20–32)
CREAT SERPL-MCNC: 0.78 MG/DL (ref 0.66–1.25)
EOSINOPHIL # BLD AUTO: 0 10E3/UL (ref 0–0.7)
EOSINOPHIL NFR BLD AUTO: 0 %
ERYTHROCYTE [DISTWIDTH] IN BLOOD BY AUTOMATED COUNT: 15.9 % (ref 10–15)
GFR SERPL CREATININE-BSD FRML MDRD: >90 ML/MIN/1.73M2
GLUCOSE BLD-MCNC: 108 MG/DL (ref 70–99)
HCT VFR BLD AUTO: 35.7 % (ref 40–53)
HGB BLD-MCNC: 11.4 G/DL (ref 13.3–17.7)
HOLD SPECIMEN: NORMAL
IMM GRANULOCYTES # BLD: 0.2 10E3/UL
IMM GRANULOCYTES NFR BLD: 2 %
LYMPHOCYTES # BLD AUTO: 0.7 10E3/UL (ref 0.8–5.3)
LYMPHOCYTES NFR BLD AUTO: 9 %
MAGNESIUM SERPL-MCNC: 1.5 MG/DL (ref 1.6–2.3)
MAYO MISC RESULT: NORMAL
MCH RBC QN AUTO: 27 PG (ref 26.5–33)
MCHC RBC AUTO-ENTMCNC: 31.9 G/DL (ref 31.5–36.5)
MCV RBC AUTO: 84 FL (ref 78–100)
MONOCYTES # BLD AUTO: 0.5 10E3/UL (ref 0–1.3)
MONOCYTES NFR BLD AUTO: 7 %
NEUTROPHILS # BLD AUTO: 6.3 10E3/UL (ref 1.6–8.3)
NEUTROPHILS NFR BLD AUTO: 82 %
NRBC # BLD AUTO: 0 10E3/UL
NRBC BLD AUTO-RTO: 0 /100
PHOSPHATE SERPL-MCNC: 2.5 MG/DL (ref 2.5–4.5)
PLATELET # BLD AUTO: 169 10E3/UL (ref 150–450)
POTASSIUM BLD-SCNC: 3.8 MMOL/L (ref 3.4–5.3)
RBC # BLD AUTO: 4.23 10E6/UL (ref 4.4–5.9)
SODIUM SERPL-SCNC: 143 MMOL/L (ref 133–144)
TACROLIMUS BLD-MCNC: 8 UG/L (ref 5–15)
TME LAST DOSE: NORMAL H
TME LAST DOSE: NORMAL H
WBC # BLD AUTO: 7.7 10E3/UL (ref 4–11)

## 2022-01-27 PROCEDURE — 80197 ASSAY OF TACROLIMUS: CPT | Performed by: INTERNAL MEDICINE

## 2022-01-27 PROCEDURE — 80048 BASIC METABOLIC PNL TOTAL CA: CPT | Performed by: INTERNAL MEDICINE

## 2022-01-27 PROCEDURE — 83735 ASSAY OF MAGNESIUM: CPT | Performed by: INTERNAL MEDICINE

## 2022-01-27 PROCEDURE — 85025 COMPLETE CBC W/AUTO DIFF WBC: CPT | Performed by: INTERNAL MEDICINE

## 2022-01-27 PROCEDURE — 76000 FLUOROSCOPY <1 HR PHYS/QHP: CPT | Mod: GC | Performed by: RADIOLOGY

## 2022-01-27 PROCEDURE — 84100 ASSAY OF PHOSPHORUS: CPT | Performed by: INTERNAL MEDICINE

## 2022-01-27 NOTE — TELEPHONE ENCOUNTER
Called patient back, requested tacrolimus level tomorrow at clinic. Appointment already made.     Patient verbalized understanding and agreement of plan. Will call back with questions, concerns, updates.

## 2022-01-27 NOTE — TELEPHONE ENCOUNTER
Pt forgot HHN was coming today and took his medications  Before she got here  Do you want him to go to lab tomorrow and just get level re-drawn or wait till Mon ?

## 2022-01-28 ENCOUNTER — ANTICOAGULATION THERAPY VISIT (OUTPATIENT)
Dept: ANTICOAGULATION | Facility: CLINIC | Age: 57
End: 2022-01-28

## 2022-01-28 ENCOUNTER — TELEPHONE (OUTPATIENT)
Dept: TRANSPLANT | Facility: CLINIC | Age: 57
End: 2022-01-28

## 2022-01-28 ENCOUNTER — HOME INFUSION (PRE-WILLOW HOME INFUSION) (OUTPATIENT)
Dept: PHARMACY | Facility: CLINIC | Age: 57
End: 2022-01-28

## 2022-01-28 ENCOUNTER — LAB (OUTPATIENT)
Dept: LAB | Facility: CLINIC | Age: 57
End: 2022-01-28
Payer: COMMERCIAL

## 2022-01-28 DIAGNOSIS — Z94.2 S/P LUNG TRANSPLANT (H): Chronic | ICD-10-CM

## 2022-01-28 DIAGNOSIS — I48.91 ATRIAL FIBRILLATION, UNSPECIFIED TYPE (H): ICD-10-CM

## 2022-01-28 DIAGNOSIS — I48.91 ATRIAL FIBRILLATION, UNSPECIFIED TYPE (H): Primary | ICD-10-CM

## 2022-01-28 DIAGNOSIS — Z94.2 LUNG REPLACED BY TRANSPLANT (H): ICD-10-CM

## 2022-01-28 DIAGNOSIS — D84.9 IMMUNOSUPPRESSED STATUS (H): ICD-10-CM

## 2022-01-28 DIAGNOSIS — Z94.2 S/P LUNG TRANSPLANT (H): ICD-10-CM

## 2022-01-28 LAB
ANION GAP SERPL CALCULATED.3IONS-SCNC: 7 MMOL/L (ref 3–14)
BACTERIA BRONCH: NO GROWTH
BUN SERPL-MCNC: 13 MG/DL (ref 7–30)
CALCIUM SERPL-MCNC: 9.4 MG/DL (ref 8.5–10.1)
CHLORIDE BLD-SCNC: 108 MMOL/L (ref 94–109)
CMV DNA SPEC NAA+PROBE-ACNC: NOT DETECTED IU/ML
CO2 SERPL-SCNC: 30 MMOL/L (ref 20–32)
CREAT SERPL-MCNC: 0.81 MG/DL (ref 0.66–1.25)
ERYTHROCYTE [DISTWIDTH] IN BLOOD BY AUTOMATED COUNT: 16 % (ref 10–15)
GFR SERPL CREATININE-BSD FRML MDRD: >90 ML/MIN/1.73M2
GLUCOSE BLD-MCNC: 91 MG/DL (ref 70–99)
HCT VFR BLD AUTO: 39.4 % (ref 40–53)
HGB BLD-MCNC: 12.4 G/DL (ref 13.3–17.7)
INR PPP: 1.04 (ref 0.85–1.15)
MAGNESIUM SERPL-MCNC: 1.6 MG/DL (ref 1.6–2.3)
MCH RBC QN AUTO: 26.8 PG (ref 26.5–33)
MCHC RBC AUTO-ENTMCNC: 31.5 G/DL (ref 31.5–36.5)
MCV RBC AUTO: 85 FL (ref 78–100)
PLATELET # BLD AUTO: 158 10E3/UL (ref 150–450)
POTASSIUM BLD-SCNC: 3.8 MMOL/L (ref 3.4–5.3)
RBC # BLD AUTO: 4.63 10E6/UL (ref 4.4–5.9)
SODIUM SERPL-SCNC: 145 MMOL/L (ref 133–144)
TACROLIMUS BLD-MCNC: 5.7 UG/L (ref 5–15)
TME LAST DOSE: NORMAL H
TME LAST DOSE: NORMAL H
WBC # BLD AUTO: 7.3 10E3/UL (ref 4–11)

## 2022-01-28 PROCEDURE — 80197 ASSAY OF TACROLIMUS: CPT | Mod: 90 | Performed by: PATHOLOGY

## 2022-01-28 PROCEDURE — 85610 PROTHROMBIN TIME: CPT | Performed by: PATHOLOGY

## 2022-01-28 PROCEDURE — 36415 COLL VENOUS BLD VENIPUNCTURE: CPT | Performed by: PATHOLOGY

## 2022-01-28 PROCEDURE — 85027 COMPLETE CBC AUTOMATED: CPT | Performed by: PATHOLOGY

## 2022-01-28 PROCEDURE — 99000 SPECIMEN HANDLING OFFICE-LAB: CPT | Performed by: PATHOLOGY

## 2022-01-28 PROCEDURE — 83735 ASSAY OF MAGNESIUM: CPT | Performed by: PATHOLOGY

## 2022-01-28 PROCEDURE — 80048 BASIC METABOLIC PNL TOTAL CA: CPT | Performed by: PATHOLOGY

## 2022-01-28 RX ORDER — TACROLIMUS 1 MG/1
2 CAPSULE ORAL 2 TIMES DAILY
Qty: 120 CAPSULE | Refills: 11 | Status: SHIPPED | OUTPATIENT
Start: 2022-01-28 | End: 2022-01-31

## 2022-01-28 RX ORDER — PANTOPRAZOLE SODIUM 40 MG/1
40 TABLET, DELAYED RELEASE ORAL
Qty: 60 TABLET | Refills: 11 | Status: SHIPPED | OUTPATIENT
Start: 2022-01-28 | End: 2022-03-24

## 2022-01-28 NOTE — TELEPHONE ENCOUNTER
FUTURE VISIT INFORMATION      FUTURE VISIT INFORMATION:    Date: 3/1/22    Time: 2PM    Location: [facility]  REFERRAL INFORMATION:    Referring provider:  Abiodun Woods MD    Referring providers clinic:  [facility] Transplant Pulmonary Disease     Reason for visit/diagnosis  blood in right auditory canal/TM- Referred by Abiodun Woods MD in  SOT LUNG- audio after    RECORDS REQUESTED FROM:       Clinic name Comments Records Status Imaging Status     [facility] Transplant Pulmonary Disease  1/25/22 note and referral from Abiodun Woods MD [facility]    Imaging 11/5/21 CT Head   10/26/21 MR Brain  [facility] PACS

## 2022-01-28 NOTE — TELEPHONE ENCOUNTER
cough up blood x 1 in sputum, bright red (less than 1 teaspoon of sputum, about 80% red). Feeling more congested since bronch but improving slowly.   No changes in breathing. VSS:  , /102 - before AM meds, O2 97%.     Patient will continue monitoring and call back if bloody sputum does not improve or changes in breathing.     Biopsy results reviewed, okay to pull PICC line per Dr. Woods. [facility] notified.     Tacrolimus level 5.7 at 13.3 hours, on 1/28/2022.  Goal 8-12.   Current dose 1.5 mg in AM, 1.5 mg in PM.    Dose changed to 2 mg in AM, 2 mg in PM   Recheck level in 3-5    Discussed with patient   In Hand [employer] message sent

## 2022-01-28 NOTE — TELEPHONE ENCOUNTER
Patient Call: Medication Refill  Route to LPN  Instruct the patient to first contact their pharmacy. If they have called their pharmacy and require further assistance, route to LPN.    Pharmacy Name: [facility]  Pharmacy Location: [address] [place] [address]  Name of Medication: Protonix Dose: 40 mg eC   When will the patient be out of this medication?: Less than 3 days (Route high priority)

## 2022-01-28 NOTE — PROGRESS NOTES
ANTICOAGULATION MANAGEMENT     Edson Thornton 56 year old male is on warfarin with subtherapeutic INR result. (Goal INR 2.0-3.0)    Recent labs: (last 7 days)     01/28/22  0926   INR 1.04       ASSESSMENT     Source(s): Chart Review     Warfarin doses taken: Warfarin recently held for Bronch which may be affecting INR  Diet: No new diet changes identified  New illness, injury, or hospitalization: No  Medication/supplement changes: IV Diflucan stopped on 1/18/22 Warfarin dose increased due to stopping Diflucan on 1/18  Signs or symptoms of bleeding or clotting: No  Previous INR: Subtherapeutic  Additional findings: Bridging with Enoxaparin until INR >= 2.0  enoxaparin ANTICOAGULANT (LOVENOX) 60 MG/0.6ML syringe 12 mL 1 1/21/2022  --   Sig - Route: Inject 0.6 mLs (60 mg) Subcutaneous every  12 Hours daily - Subcutaneous                   PLAN     Recommended plan for temporary change(s) affecting INR     Dosing Instructions: Booster dose then Increase your warfarin dose (8.3% change) with next INR in 3 days       Summary  As of 1/28/2022    Full warfarin instructions:  1/28: 3 mg; Otherwise 1 mg every Tue; 2 mg all other days   Next INR check:  1/31/2022             Detailed voice message left for Edson with dosing instructions and follow up date.     Check at provider office visit    Education provided: Please call back if any changes to your diet, medications or how you've been taking warfarin and Lovenox/Heparin education provided: prescribed dose and frequency     Plan made with [facility] Pharmacist Nick Hobbs, RN  Anticoagulation Clinic  1/28/2022    _______________________________________________________________________     Anticoagulation Episode Summary     Current INR goal:  2.0-3.0   TTR:  37.2 % (2.9 wk)   Target end date:  Indefinite   Send INR reminders to:  [facility]    Indications    Atrial fibrillation  unspecified type (H) [I48.91]           Comments:  [facility]  P: [phone]  Transplant Labs twice a week  Staying Local: [facility] then back to [place]         Anticoagulation Care Providers     Provider Role Specialty Phone number    Abiodun Woods MD Referring Pulmonary Disease [phone]

## 2022-01-31 ENCOUNTER — TELEPHONE (OUTPATIENT)
Dept: TRANSPLANT | Facility: CLINIC | Age: 57
End: 2022-01-31

## 2022-01-31 ENCOUNTER — APPOINTMENT (OUTPATIENT)
Dept: CT IMAGING | Facility: CLINIC | Age: 57
End: 2022-01-31
Attending: EMERGENCY MEDICINE
Payer: COMMERCIAL

## 2022-01-31 ENCOUNTER — LAB (OUTPATIENT)
Dept: LAB | Facility: CLINIC | Age: 57
End: 2022-01-31
Payer: COMMERCIAL

## 2022-01-31 ENCOUNTER — HOSPITAL ENCOUNTER (EMERGENCY)
Facility: CLINIC | Age: 57
Discharge: HOME | End: 2022-02-01
Attending: EMERGENCY MEDICINE | Admitting: EMERGENCY MEDICINE
Payer: COMMERCIAL

## 2022-01-31 ENCOUNTER — HOME INFUSION (PRE-WILLOW HOME INFUSION) (OUTPATIENT)
Dept: PHARMACY | Facility: CLINIC | Age: 57
End: 2022-01-31

## 2022-01-31 ENCOUNTER — NURSE TRIAGE (OUTPATIENT)
Dept: NURSING | Facility: CLINIC | Age: 57
End: 2022-01-31

## 2022-01-31 ENCOUNTER — ANTICOAGULATION THERAPY VISIT (OUTPATIENT)
Dept: ANTICOAGULATION | Facility: CLINIC | Age: 57
End: 2022-01-31

## 2022-01-31 VITALS
SYSTOLIC BLOOD PRESSURE: 150 MMHG | HEART RATE: 73 BPM | OXYGEN SATURATION: 94 % | TEMPERATURE: 98.6 F | RESPIRATION RATE: 18 BRPM | DIASTOLIC BLOOD PRESSURE: 103 MMHG

## 2022-01-31 DIAGNOSIS — Z94.2 S/P LUNG TRANSPLANT (H): Primary | ICD-10-CM

## 2022-01-31 DIAGNOSIS — Z79.01: ICD-10-CM

## 2022-01-31 DIAGNOSIS — Z94.2 HISTORY OF LUNG TRANSPLANT (H): ICD-10-CM

## 2022-01-31 DIAGNOSIS — D84.9 IMMUNOSUPPRESSED STATUS (H): ICD-10-CM

## 2022-01-31 DIAGNOSIS — Z94.2 LUNG REPLACED BY TRANSPLANT (H): ICD-10-CM

## 2022-01-31 DIAGNOSIS — S01.81XA LACERATION OF FOREHEAD, INITIAL ENCOUNTER: ICD-10-CM

## 2022-01-31 DIAGNOSIS — I48.91 ATRIAL FIBRILLATION, UNSPECIFIED TYPE (H): ICD-10-CM

## 2022-01-31 DIAGNOSIS — I48.91 ATRIAL FIBRILLATION, UNSPECIFIED TYPE (H): Primary | ICD-10-CM

## 2022-01-31 LAB
ANION GAP SERPL CALCULATED.3IONS-SCNC: 1 MMOL/L (ref 3–14)
ANION GAP SERPL CALCULATED.3IONS-SCNC: 7 MMOL/L (ref 3–14)
BASOPHILS # BLD AUTO: 0 10E3/UL (ref 0–0.2)
BASOPHILS NFR BLD AUTO: 0 %
BRONCHOSCOPY: NORMAL
BUN SERPL-MCNC: 10 MG/DL (ref 7–30)
BUN SERPL-MCNC: 11 MG/DL (ref 7–30)
CALCIUM SERPL-MCNC: 9.2 MG/DL (ref 8.5–10.1)
CALCIUM SERPL-MCNC: 9.5 MG/DL (ref 8.5–10.1)
CHLORIDE BLD-SCNC: 109 MMOL/L (ref 94–109)
CHLORIDE BLD-SCNC: 112 MMOL/L (ref 94–109)
CO2 SERPL-SCNC: 26 MMOL/L (ref 20–32)
CO2 SERPL-SCNC: 27 MMOL/L (ref 20–32)
CREAT SERPL-MCNC: 0.78 MG/DL (ref 0.66–1.25)
CREAT SERPL-MCNC: 1.04 MG/DL (ref 0.66–1.25)
EOSINOPHIL # BLD AUTO: 0 10E3/UL (ref 0–0.7)
EOSINOPHIL NFR BLD AUTO: 0 %
ERYTHROCYTE [DISTWIDTH] IN BLOOD BY AUTOMATED COUNT: 15.9 % (ref 10–15)
ERYTHROCYTE [DISTWIDTH] IN BLOOD BY AUTOMATED COUNT: 16.1 % (ref 10–15)
GFR SERPL CREATININE-BSD FRML MDRD: 84 ML/MIN/1.73M2
GFR SERPL CREATININE-BSD FRML MDRD: >90 ML/MIN/1.73M2
GLUCOSE BLD-MCNC: 112 MG/DL (ref 70–99)
GLUCOSE BLD-MCNC: 97 MG/DL (ref 70–99)
HCT VFR BLD AUTO: 35.4 % (ref 40–53)
HCT VFR BLD AUTO: 38.3 % (ref 40–53)
HGB BLD-MCNC: 11.2 G/DL (ref 13.3–17.7)
HGB BLD-MCNC: 12.3 G/DL (ref 13.3–17.7)
IMM GRANULOCYTES # BLD: 0.2 10E3/UL
IMM GRANULOCYTES NFR BLD: 3 %
INR PPP: 1.03 (ref 0.85–1.15)
INR PPP: 1.07 (ref 0.85–1.15)
LYMPHOCYTES # BLD AUTO: 0.9 10E3/UL (ref 0.8–5.3)
LYMPHOCYTES NFR BLD AUTO: 11 %
MAGNESIUM SERPL-MCNC: 1.9 MG/DL (ref 1.6–2.3)
MCH RBC QN AUTO: 26.5 PG (ref 26.5–33)
MCH RBC QN AUTO: 26.7 PG (ref 26.5–33)
MCHC RBC AUTO-ENTMCNC: 31.6 G/DL (ref 31.5–36.5)
MCHC RBC AUTO-ENTMCNC: 32.1 G/DL (ref 31.5–36.5)
MCV RBC AUTO: 83 FL (ref 78–100)
MCV RBC AUTO: 84 FL (ref 78–100)
MONOCYTES # BLD AUTO: 1 10E3/UL (ref 0–1.3)
MONOCYTES NFR BLD AUTO: 12 %
NEUTROPHILS # BLD AUTO: 6.1 10E3/UL (ref 1.6–8.3)
NEUTROPHILS NFR BLD AUTO: 74 %
NRBC # BLD AUTO: 0 10E3/UL
NRBC BLD AUTO-RTO: 0 /100
PLATELET # BLD AUTO: 163 10E3/UL (ref 150–450)
PLATELET # BLD AUTO: 168 10E3/UL (ref 150–450)
POTASSIUM BLD-SCNC: 3.4 MMOL/L (ref 3.4–5.3)
POTASSIUM BLD-SCNC: 3.6 MMOL/L (ref 3.4–5.3)
RBC # BLD AUTO: 4.22 10E6/UL (ref 4.4–5.9)
RBC # BLD AUTO: 4.6 10E6/UL (ref 4.4–5.9)
SODIUM SERPL-SCNC: 139 MMOL/L (ref 133–144)
SODIUM SERPL-SCNC: 143 MMOL/L (ref 133–144)
TACROLIMUS BLD-MCNC: 3.5 UG/L (ref 5–15)
TME LAST DOSE: ABNORMAL H
TME LAST DOSE: ABNORMAL H
WBC # BLD AUTO: 6.4 10E3/UL (ref 4–11)
WBC # BLD AUTO: 8.2 10E3/UL (ref 4–11)

## 2022-01-31 PROCEDURE — 99283 EMERGENCY DEPT VISIT LOW MDM: CPT | Mod: 25 | Performed by: EMERGENCY MEDICINE

## 2022-01-31 PROCEDURE — 12011 RPR F/E/E/N/L/M 2.5 CM/<: CPT | Performed by: EMERGENCY MEDICINE

## 2022-01-31 PROCEDURE — 272N000047 HC ADHESIVE DERMABOND SKIN: Performed by: EMERGENCY MEDICINE

## 2022-01-31 PROCEDURE — 80197 ASSAY OF TACROLIMUS: CPT | Mod: 90 | Performed by: PATHOLOGY

## 2022-01-31 PROCEDURE — 99000 SPECIMEN HANDLING OFFICE-LAB: CPT | Performed by: PATHOLOGY

## 2022-01-31 PROCEDURE — 250N000013 HC RX MED GY IP 250 OP 250 PS 637: Performed by: EMERGENCY MEDICINE

## 2022-01-31 PROCEDURE — 70450 CT HEAD/BRAIN W/O DYE: CPT | Mod: 26 | Performed by: RADIOLOGY

## 2022-01-31 PROCEDURE — 85025 COMPLETE CBC W/AUTO DIFF WBC: CPT | Performed by: PATHOLOGY

## 2022-01-31 PROCEDURE — 250N000011 HC RX IP 250 OP 636: Performed by: EMERGENCY MEDICINE

## 2022-01-31 PROCEDURE — 96372 THER/PROPH/DIAG INJ SC/IM: CPT | Mod: 59 | Performed by: EMERGENCY MEDICINE

## 2022-01-31 PROCEDURE — 99285 EMERGENCY DEPT VISIT HI MDM: CPT | Mod: 25 | Performed by: EMERGENCY MEDICINE

## 2022-01-31 PROCEDURE — 80048 BASIC METABOLIC PNL TOTAL CA: CPT | Performed by: EMERGENCY MEDICINE

## 2022-01-31 PROCEDURE — 80048 BASIC METABOLIC PNL TOTAL CA: CPT | Performed by: PATHOLOGY

## 2022-01-31 PROCEDURE — 85610 PROTHROMBIN TIME: CPT | Performed by: PATHOLOGY

## 2022-01-31 PROCEDURE — 70450 CT HEAD/BRAIN W/O DYE: CPT

## 2022-01-31 PROCEDURE — 36415 COLL VENOUS BLD VENIPUNCTURE: CPT | Performed by: PATHOLOGY

## 2022-01-31 PROCEDURE — 36415 COLL VENOUS BLD VENIPUNCTURE: CPT | Performed by: EMERGENCY MEDICINE

## 2022-01-31 PROCEDURE — 83735 ASSAY OF MAGNESIUM: CPT | Performed by: PATHOLOGY

## 2022-01-31 PROCEDURE — 85610 PROTHROMBIN TIME: CPT | Performed by: EMERGENCY MEDICINE

## 2022-01-31 PROCEDURE — 250N000012 HC RX MED GY IP 250 OP 636 PS 637: Performed by: EMERGENCY MEDICINE

## 2022-01-31 RX ORDER — TACROLIMUS 0.5 MG/1
0.5 CAPSULE ORAL 2 TIMES DAILY
Qty: 60 CAPSULE | Refills: 11 | Status: SHIPPED | OUTPATIENT
Start: 2022-01-31 | End: 2022-02-03

## 2022-01-31 RX ORDER — TACROLIMUS 1 MG/1
3 CAPSULE ORAL 2 TIMES DAILY
Qty: 180 CAPSULE | Refills: 11 | Status: SHIPPED | OUTPATIENT
Start: 2022-01-31 | End: 2022-02-03

## 2022-01-31 RX ORDER — WARFARIN SODIUM 4 MG/1
4 TABLET ORAL ONCE
Status: COMPLETED | OUTPATIENT
Start: 2022-01-31 | End: 2022-01-31

## 2022-01-31 RX ORDER — AMLODIPINE BESYLATE 5 MG/1
5 TABLET ORAL ONCE
Status: COMPLETED | OUTPATIENT
Start: 2022-01-31 | End: 2022-01-31

## 2022-01-31 RX ORDER — MYCOPHENOLATE MOFETIL 250 MG/1
1250 CAPSULE ORAL ONCE
Status: COMPLETED | OUTPATIENT
Start: 2022-01-31 | End: 2022-01-31

## 2022-01-31 RX ADMIN — AMLODIPINE BESYLATE 5 MG: 5 TABLET ORAL at 21:38

## 2022-01-31 RX ADMIN — NYSTATIN 10 ML: 100000 SUSPENSION ORAL at 21:38

## 2022-01-31 RX ADMIN — MYCOPHENOLATE MOFETIL 1250 MG: 250 CAPSULE ORAL at 21:38

## 2022-01-31 RX ADMIN — TACROLIMUS 3.5 MG: 1 CAPSULE ORAL at 21:38

## 2022-01-31 RX ADMIN — ENOXAPARIN SODIUM 60 MG: 60 INJECTION SUBCUTANEOUS at 21:38

## 2022-01-31 RX ADMIN — WARFARIN SODIUM 4 MG: 4 TABLET ORAL at 21:38

## 2022-01-31 ASSESSMENT — ENCOUNTER SYMPTOMS
DIFFICULTY URINATING: 0
HEADACHES: 1
SORE THROAT: 0
COLOR CHANGE: 0
EYE REDNESS: 0
SHORTNESS OF BREATH: 0
CONFUSION: 0
ARTHRALGIAS: 0
DIARRHEA: 0
COUGH: 0
DIZZINESS: 0
FEVER: 0
PALPITATIONS: 0
VOMITING: 0
ABDOMINAL PAIN: 0
NAUSEA: 0
CHILLS: 0

## 2022-01-31 NOTE — TELEPHONE ENCOUNTER
Tacrolimus level 3.5 at 12 hours, on 1/31/2022.  Goal 8-12.   Current dose 2 mg in AM, 2 mg in PM    Take extra 2mg now,  Dose changed to 3.5 mg in AM, 3.5 mg in PM   Recheck level in 3    Discussed with patient   MyChart message sent     Patient inquiring about getting COVID vaccine.   Patient got Evusheld 2 weeks ago. Informed patient currently holding on COVID vaccine after Evusheld.   Will discuss patient needing pneumonia vaccine at next visit 2/10.     Patient verbalized understanding and agreement of plan. Will call back with questions, concerns, updates.

## 2022-01-31 NOTE — PROGRESS NOTES
ANTICOAGULATION MANAGEMENT     Edson Thornton 56 year old male is on warfarin with subtherapeutic INR result. (Goal INR 2.0-3.0)    Recent labs: (last 7 days)     01/31/22  0850   INR 1.03       ASSESSMENT     Source(s): Chart Review and Patient/Caregiver Call     Warfarin doses taken: Warfarin taken as instructed  Diet: No new diet changes identified  New illness, injury, or hospitalization: No  Medication/supplement changes: pt off diflucan 1/18/22  Signs or symptoms of bleeding or clotting: No  Previous INR: Subtherapeutic  Additional findings: Bridging with Enoxaparin until INR >= 2.0 x 1 ([facility] protocol goal INR 2-3)   PLAN     Recommended plan for temporary change(s) affecting INR     Dosing Instructions: Booster dose then Increase your warfarin dose (61.5% change) with next INR in 3 days       Summary  As of 1/31/2022    Full warfarin instructions:  1/31: 4 mg; Otherwise 3 mg every day   Next INR check:  2/3/2022             Telephone call with Edson who verbalizes understanding and agrees to plan    Lab visit scheduled    Education provided: Goal range and significance of current result and normal effect on INR due to the diflucan being discontinued.    Plan made with [facility] Pharmacist Usha Lopez, RN  Anticoagulation Clinic  1/31/2022    _______________________________________________________________________     Anticoagulation Episode Summary     Current INR goal:  2.0-3.0   TTR:  32.4 % (3.3 wk)   Target end date:  Indefinite   Send INR reminders to:  [facility]    Indications    Atrial fibrillation  unspecified type (H) [I48.91]           Comments:  [facility] P: [phone]  Transplant Labs twice a week  Staying Local: [facility] then back to [place]         Anticoagulation Care Providers     Provider Role Specialty Phone number    Abiodun Woods MD Referring Pulmonary Disease [phone]

## 2022-02-01 ENCOUNTER — APPOINTMENT (OUTPATIENT)
Dept: CT IMAGING | Facility: CLINIC | Age: 57
End: 2022-02-01
Attending: EMERGENCY MEDICINE
Payer: COMMERCIAL

## 2022-02-01 ENCOUNTER — DOCUMENTATION ONLY (OUTPATIENT)
Dept: ANTICOAGULATION | Facility: CLINIC | Age: 57
End: 2022-02-01

## 2022-02-01 DIAGNOSIS — I48.91 ATRIAL FIBRILLATION, UNSPECIFIED TYPE (H): Primary | ICD-10-CM

## 2022-02-01 LAB
PATH REPORT.ADDENDUM SPEC: NORMAL
PATH REPORT.COMMENTS IMP SPEC: NORMAL
PATH REPORT.COMMENTS IMP SPEC: NORMAL
PATH REPORT.FINAL DX SPEC: NORMAL
PATH REPORT.GROSS SPEC: NORMAL
PATH REPORT.MICROSCOPIC SPEC OTHER STN: NORMAL
PATH REPORT.RELEVANT HX SPEC: NORMAL
PHOTO IMAGE: NORMAL

## 2022-02-01 PROCEDURE — 70450 CT HEAD/BRAIN W/O DYE: CPT

## 2022-02-01 PROCEDURE — 70450 CT HEAD/BRAIN W/O DYE: CPT | Mod: 26 | Performed by: RADIOLOGY

## 2022-02-01 NOTE — ED NOTES
"Pt. Appears to be agitated . Pt is in hallway near [facility] stating \" I have bee n here for 9 hours . I need to leave \" . Pt refuses to retake vital signs .  "

## 2022-02-01 NOTE — PROGRESS NOTES
ANTICOAGULATION  MANAGEMENT: Discharge Review    Edson Thornton chart reviewed for anticoagulation continuity of care    Emergency room visit on 1/31/22-2/1/22 for right forehead laceration with mild bleeding.    Discharge disposition: Home    Results:    Recent labs: (last 7 days)     01/28/22  0926 01/31/22  0850 01/31/22  1919   INR 1.04 1.03 1.07     Anticoagulation inpatient management:     home regimen continued    Anticoagulation discharge instructions:     Warfarin dosing: per [facility] encounter yesterday-  Booster dose then Increase your warfarin dose (61.5% change) with next INR in 3 days      Bridging: bridging with enoxaparin (Lovenox)-until INR >= 2.0 x 1 ([facility] protocol goal INR 2-3)   INR goal change: No      Medication changes affecting anticoagulation: Yes: pt completed diflucan 1/18/22      Additional factors affecting anticoagulation: Yes: head injury on Warfarin and Lovenox    Plan     Recommend to check INR on 2/3/22 as previously scheduled    Patient not contacted    Anticoagulation Calendar updated    ESSIE COLÓN, RN

## 2022-02-01 NOTE — DISCHARGE INSTRUCTIONS
You have been seen in the emergency department today for head injury.  We have repaired your laceration with medical grade adhesive.  This adhesive will fall off on its own in 5 to 7 days.  You can use ice to help with pain or swelling.  Tylenol is also safe.  We would not recommend ibuprofen due to the fact that you are on blood thinners.    Follow-up with your clinic as needed.

## 2022-02-01 NOTE — ED TRIAGE NOTES
Lung transplant in October, following transplant pt had multiple TIAs- resulting in some R sided deficits.   Pt was walking today in hallway and ran into the edge of the wall, hitting the R side of his head. Anticoagulated, coumadin, Lovenox

## 2022-02-01 NOTE — TELEPHONE ENCOUNTER
Patient calling to report a head injury he just sustained.  He has already called 911 and paramedics are there and spoke to RN on the phone.  No further need for triage.    Loren Álvarez RN  [facility]

## 2022-02-01 NOTE — ED NOTES
Bed: ED17  Expected date:   Expected time:   Means of arrival:   Comments:  H 427   56 male  Fall hit head, bleeding, anticoag
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
## 2022-02-02 ENCOUNTER — TELEPHONE (OUTPATIENT)
Dept: ANTICOAGULATION | Facility: CLINIC | Age: 57
End: 2022-02-02
Payer: COMMERCIAL

## 2022-02-02 DIAGNOSIS — I48.91 ATRIAL FIBRILLATION, UNSPECIFIED TYPE (H): Primary | ICD-10-CM

## 2022-02-02 NOTE — TELEPHONE ENCOUNTER
Received a call from Lakshmi, [facility]  She states they are seeing Bret and will be able to draw his INR.  Bret has an appointment at the [facility] tomorrow, 2/3/22 and will have his labs drawn then.  Please call Lakshmi (ph: [phone]) with Bret's INR result, warfarin recommendations, and next INR date after INR on 2/3/22.  Mesha Chau RN

## 2022-02-03 ENCOUNTER — TELEPHONE (OUTPATIENT)
Dept: TRANSPLANT | Facility: CLINIC | Age: 57
End: 2022-02-03

## 2022-02-03 ENCOUNTER — LAB (OUTPATIENT)
Dept: LAB | Facility: CLINIC | Age: 57
End: 2022-02-03

## 2022-02-03 ENCOUNTER — ANTICOAGULATION THERAPY VISIT (OUTPATIENT)
Dept: ANTICOAGULATION | Facility: CLINIC | Age: 57
End: 2022-02-03

## 2022-02-03 DIAGNOSIS — Z94.2 LUNG REPLACED BY TRANSPLANT (H): ICD-10-CM

## 2022-02-03 DIAGNOSIS — I48.91 ATRIAL FIBRILLATION, UNSPECIFIED TYPE (H): ICD-10-CM

## 2022-02-03 DIAGNOSIS — Z94.2 S/P LUNG TRANSPLANT (H): ICD-10-CM

## 2022-02-03 DIAGNOSIS — D84.9 IMMUNOSUPPRESSED STATUS (H): ICD-10-CM

## 2022-02-03 DIAGNOSIS — I48.91 ATRIAL FIBRILLATION, UNSPECIFIED TYPE (H): Primary | ICD-10-CM

## 2022-02-03 LAB
ANION GAP SERPL CALCULATED.3IONS-SCNC: 6 MMOL/L (ref 3–14)
BUN SERPL-MCNC: 15 MG/DL (ref 7–30)
CALCIUM SERPL-MCNC: 9.2 MG/DL (ref 8.5–10.1)
CHLORIDE BLD-SCNC: 111 MMOL/L (ref 94–109)
CMV DNA SPEC NAA+PROBE-ACNC: NOT DETECTED IU/ML
CO2 SERPL-SCNC: 28 MMOL/L (ref 20–32)
CREAT SERPL-MCNC: 0.74 MG/DL (ref 0.66–1.25)
ERYTHROCYTE [DISTWIDTH] IN BLOOD BY AUTOMATED COUNT: 15.9 % (ref 10–15)
GFR SERPL CREATININE-BSD FRML MDRD: >90 ML/MIN/1.73M2
GLUCOSE BLD-MCNC: 85 MG/DL (ref 70–99)
HCT VFR BLD AUTO: 35.8 % (ref 40–53)
HGB BLD-MCNC: 11.5 G/DL (ref 13.3–17.7)
INR PPP: 1.05 (ref 0.85–1.15)
MAGNESIUM SERPL-MCNC: 1.8 MG/DL (ref 1.6–2.3)
MCH RBC QN AUTO: 26.9 PG (ref 26.5–33)
MCHC RBC AUTO-ENTMCNC: 32.1 G/DL (ref 31.5–36.5)
MCV RBC AUTO: 84 FL (ref 78–100)
PLATELET # BLD AUTO: 146 10E3/UL (ref 150–450)
POTASSIUM BLD-SCNC: 3.4 MMOL/L (ref 3.4–5.3)
RBC # BLD AUTO: 4.28 10E6/UL (ref 4.4–5.9)
SODIUM SERPL-SCNC: 145 MMOL/L (ref 133–144)
TACROLIMUS BLD-MCNC: 4.7 UG/L (ref 5–15)
TME LAST DOSE: ABNORMAL H
TME LAST DOSE: ABNORMAL H
WBC # BLD AUTO: 5.9 10E3/UL (ref 4–11)

## 2022-02-03 PROCEDURE — 99000 SPECIMEN HANDLING OFFICE-LAB: CPT | Performed by: PATHOLOGY

## 2022-02-03 PROCEDURE — 86833 HLA CLASS II HIGH DEFIN QUAL: CPT | Performed by: PATHOLOGY

## 2022-02-03 PROCEDURE — 85027 COMPLETE CBC AUTOMATED: CPT | Performed by: PATHOLOGY

## 2022-02-03 PROCEDURE — 36415 COLL VENOUS BLD VENIPUNCTURE: CPT | Performed by: PATHOLOGY

## 2022-02-03 PROCEDURE — 80048 BASIC METABOLIC PNL TOTAL CA: CPT | Performed by: PATHOLOGY

## 2022-02-03 PROCEDURE — 85610 PROTHROMBIN TIME: CPT | Performed by: PATHOLOGY

## 2022-02-03 PROCEDURE — 80197 ASSAY OF TACROLIMUS: CPT | Mod: 90 | Performed by: PATHOLOGY

## 2022-02-03 PROCEDURE — 83735 ASSAY OF MAGNESIUM: CPT | Performed by: PATHOLOGY

## 2022-02-03 PROCEDURE — 86832 HLA CLASS I HIGH DEFIN QUAL: CPT | Performed by: PATHOLOGY

## 2022-02-03 RX ORDER — WARFARIN SODIUM 2 MG/1
TABLET ORAL
Qty: 90 TABLET | Refills: 1 | Status: SHIPPED | OUTPATIENT
Start: 2022-02-03 | End: 2022-03-24

## 2022-02-03 RX ORDER — TACROLIMUS 1 MG/1
CAPSULE ORAL
Qty: 270 CAPSULE | Refills: 11 | Status: SHIPPED | OUTPATIENT
Start: 2022-02-03 | End: 2022-03-16

## 2022-02-03 RX ORDER — TACROLIMUS 0.5 MG/1
0.5 CAPSULE ORAL EVERY EVENING
Qty: 30 CAPSULE | Refills: 11 | Status: SHIPPED | OUTPATIENT
Start: 2022-02-03 | End: 2022-03-16

## 2022-02-03 NOTE — TELEPHONE ENCOUNTER
Tacrolimus level 4.7 at 12 hours, on 2/3/22.  Goal 8-12.   Current dose 3.5 mg in AM, 3.5 mg in PM    Dose changed to 5 mg in AM, 4.5 mg in PM   Recheck level on Monday. Lab [place] 0745 at the [facility]     Discussed with patient.    [employer] message sent.

## 2022-02-03 NOTE — PROGRESS NOTES
ANTICOAGULATION MANAGEMENT     Edson Thornton 56 year old male is on warfarin with subtherapeutic INR result. (Goal INR 2.0-3.0)    Recent labs: (last 7 days)     02/03/22  0853   INR 1.05       ASSESSMENT     Source(s): Chart Review and Patient/Caregiver Call     Warfarin doses taken: Warfarin taken as instructed  Diet: No new diet changes identified  New illness, injury, or hospitalization: No  Medication/supplement changes: Diflucan was stopped 1/18/22  Signs or symptoms of bleeding or clotting: No  Previous INR: Subtherapeutic  Additional findings: Patient will continue with Lovenox 60mg BID.      PLAN     Recommended plan for no diet, medication or health factor changes affecting INR     Dosing Instructions: Booster dose then Increase your warfarin dose (33% change) with next INR in 4 days       Summary  As of 2/3/2022    Full warfarin instructions:  2/3: 6 mg; Otherwise 4 mg every day   Next INR check:  2/7/2022             Telephone call with Edson who verbalizes understanding and agrees to plan and who agrees to plan and repeated back plan correctly    Orders given to  Homecare nurse/facility to recheck.  Message is left for Lakshmi TORREZ also.     Education provided: Lovenox/Heparin education provided: prescribed dose and frequency     Plan made per [facility] anticoagulation protocol    Terrie Cedillo, RN  Anticoagulation Clinic  2/3/2022    _______________________________________________________________________     Anticoagulation Episode Summary     Current INR goal:  2.0-3.0   TTR:  28.8 % (3.7 wk)   Target end date:  Indefinite   Send INR reminders to:  [facility]    Indications    Atrial fibrillation  unspecified type (H) [I48.91]           Comments:  [facility] P: [phone]  Transplant Labs twice a week  Staying Local: [facility] then back to [place]         Anticoagulation Care Providers     Provider Role Specialty Phone number    Abiodun Woods MD Referring Pulmonary  [facility] [phone]

## 2022-02-04 LAB
DONOR IDENTIFICATION: NORMAL
DQB5: 2488
DSA COMMENTS: NORMAL
DSA PRESENT: YES
DSA TEST METHOD: NORMAL
ORGAN: NORMAL
SA 1 CELL: NORMAL
SA 1 TEST METHOD: NORMAL
SA 2 CELL: NORMAL
SA 2 TEST METHOD: NORMAL
SA1 HI RISK ABY: NORMAL
SA1 MOD RISK ABY: NORMAL
SA2 HI RISK ABY: NORMAL
SA2 MOD RISK ABY: NORMAL
UNACCEPTABLE ANTIGENS: NORMAL
UNOS CPRA: 26
ZZZSA 1  COMMENTS: NORMAL
ZZZSA 2 COMMENTS: NORMAL

## 2022-02-07 ENCOUNTER — ANTICOAGULATION THERAPY VISIT (OUTPATIENT)
Dept: ANTICOAGULATION | Facility: CLINIC | Age: 57
End: 2022-02-07

## 2022-02-07 ENCOUNTER — LAB (OUTPATIENT)
Dept: LAB | Facility: CLINIC | Age: 57
End: 2022-02-07
Payer: COMMERCIAL

## 2022-02-07 ENCOUNTER — OFFICE VISIT (OUTPATIENT)
Dept: OPHTHALMOLOGY | Facility: CLINIC | Age: 57
End: 2022-02-07
Attending: INTERNAL MEDICINE
Payer: COMMERCIAL

## 2022-02-07 DIAGNOSIS — J84.178: ICD-10-CM

## 2022-02-07 DIAGNOSIS — I48.91 ATRIAL FIBRILLATION, UNSPECIFIED TYPE (H): Primary | ICD-10-CM

## 2022-02-07 DIAGNOSIS — M06.9 RHEUMATOID ARTHRITIS INVOLVING BOTH HANDS, UNSPECIFIED WHETHER RHEUMATOID FACTOR PRESENT (H): ICD-10-CM

## 2022-02-07 DIAGNOSIS — Z79.4 TYPE 2 DIABETES MELLITUS WITH HYPERGLYCEMIA, WITH LONG-TERM CURRENT USE OF INSULIN (H): ICD-10-CM

## 2022-02-07 DIAGNOSIS — Z94.2 S/P LUNG TRANSPLANT (H): ICD-10-CM

## 2022-02-07 DIAGNOSIS — H53.2 DIPLOPIA: ICD-10-CM

## 2022-02-07 DIAGNOSIS — E11.65 TYPE 2 DIABETES MELLITUS WITH HYPERGLYCEMIA, WITH LONG-TERM CURRENT USE OF INSULIN (H): ICD-10-CM

## 2022-02-07 DIAGNOSIS — M05.10: ICD-10-CM

## 2022-02-07 DIAGNOSIS — I48.91 ATRIAL FIBRILLATION, UNSPECIFIED TYPE (H): ICD-10-CM

## 2022-02-07 DIAGNOSIS — H53.40 VISUAL FIELD DEFECT: Primary | ICD-10-CM

## 2022-02-07 DIAGNOSIS — H92.21 BLOOD IN EAR CANAL, RIGHT: ICD-10-CM

## 2022-02-07 DIAGNOSIS — H53.40 VISUAL FIELD DEFECT: ICD-10-CM

## 2022-02-07 LAB
ANION GAP SERPL CALCULATED.3IONS-SCNC: 7 MMOL/L (ref 3–14)
BUN SERPL-MCNC: 18 MG/DL (ref 7–30)
CALCIUM SERPL-MCNC: 9 MG/DL (ref 8.5–10.1)
CHLORIDE BLD-SCNC: 112 MMOL/L (ref 94–109)
CMV DNA SPEC NAA+PROBE-ACNC: NOT DETECTED IU/ML
CO2 SERPL-SCNC: 25 MMOL/L (ref 20–32)
CREAT SERPL-MCNC: 0.82 MG/DL (ref 0.66–1.25)
ERYTHROCYTE [DISTWIDTH] IN BLOOD BY AUTOMATED COUNT: 15.9 % (ref 10–15)
GFR SERPL CREATININE-BSD FRML MDRD: >90 ML/MIN/1.73M2
GLUCOSE BLD-MCNC: 97 MG/DL (ref 70–99)
HCT VFR BLD AUTO: 36.7 % (ref 40–53)
HGB BLD-MCNC: 11.8 G/DL (ref 13.3–17.7)
INR PPP: 1.17 (ref 0.85–1.15)
MAGNESIUM SERPL-MCNC: 1.5 MG/DL (ref 1.8–2.6)
MCH RBC QN AUTO: 26.9 PG (ref 26.5–33)
MCHC RBC AUTO-ENTMCNC: 32.2 G/DL (ref 31.5–36.5)
MCV RBC AUTO: 84 FL (ref 78–100)
PLATELET # BLD AUTO: 164 10E3/UL (ref 150–450)
POTASSIUM BLD-SCNC: 3.7 MMOL/L (ref 3.4–5.3)
RBC # BLD AUTO: 4.38 10E6/UL (ref 4.4–5.9)
SODIUM SERPL-SCNC: 144 MMOL/L (ref 133–144)
TACROLIMUS BLD-MCNC: 9.1 UG/L (ref 5–15)
TME LAST DOSE: NORMAL H
TME LAST DOSE: NORMAL H
WBC # BLD AUTO: 7.5 10E3/UL (ref 4–11)

## 2022-02-07 PROCEDURE — 86832 HLA CLASS I HIGH DEFIN QUAL: CPT | Performed by: PATHOLOGY

## 2022-02-07 PROCEDURE — 92060 SENSORIMOTOR EXAMINATION: CPT | Performed by: INTERNAL MEDICINE

## 2022-02-07 PROCEDURE — 87799 DETECT AGENT NOS DNA QUANT: CPT | Mod: 90 | Performed by: PATHOLOGY

## 2022-02-07 PROCEDURE — 83735 ASSAY OF MAGNESIUM: CPT | Mod: 90 | Performed by: PATHOLOGY

## 2022-02-07 PROCEDURE — 99000 SPECIMEN HANDLING OFFICE-LAB: CPT | Performed by: PATHOLOGY

## 2022-02-07 PROCEDURE — 92133 CPTRZD OPH DX IMG PST SGM ON: CPT | Performed by: INTERNAL MEDICINE

## 2022-02-07 PROCEDURE — 86833 HLA CLASS II HIGH DEFIN QUAL: CPT | Performed by: PATHOLOGY

## 2022-02-07 PROCEDURE — 80048 BASIC METABOLIC PNL TOTAL CA: CPT | Performed by: PATHOLOGY

## 2022-02-07 PROCEDURE — 92060 SENSORIMOTOR EXAMINATION: CPT | Mod: 26 | Performed by: INTERNAL MEDICINE

## 2022-02-07 PROCEDURE — 36415 COLL VENOUS BLD VENIPUNCTURE: CPT | Performed by: PATHOLOGY

## 2022-02-07 PROCEDURE — 99205 OFFICE O/P NEW HI 60 MIN: CPT | Mod: GC | Performed by: INTERNAL MEDICINE

## 2022-02-07 PROCEDURE — 92081 LIMITED VISUAL FIELD XM: CPT | Performed by: INTERNAL MEDICINE

## 2022-02-07 PROCEDURE — 80197 ASSAY OF TACROLIMUS: CPT | Mod: 90 | Performed by: PATHOLOGY

## 2022-02-07 PROCEDURE — 85610 PROTHROMBIN TIME: CPT | Performed by: PATHOLOGY

## 2022-02-07 PROCEDURE — 85027 COMPLETE CBC AUTOMATED: CPT | Performed by: PATHOLOGY

## 2022-02-07 PROCEDURE — G0463 HOSPITAL OUTPT CLINIC VISIT: HCPCS | Performed by: TECHNICIAN/TECHNOLOGIST

## 2022-02-07 ASSESSMENT — TONOMETRY
OD_IOP_MMHG: 18
IOP_METHOD: ICARE
OS_IOP_MMHG: 18

## 2022-02-07 ASSESSMENT — VISUAL ACUITY
METHOD: SNELLEN - LINEAR
OD_CC: 20/20
CORRECTION_TYPE: GLASSES
OS_CC: 20/20
OD_CC+: -2
OS_CC+: -1

## 2022-02-07 ASSESSMENT — SLIT LAMP EXAM - LIDS
COMMENTS: NORMAL
COMMENTS: NORMAL

## 2022-02-07 ASSESSMENT — EXTERNAL EXAM - LEFT EYE: OS_EXAM: NORMAL

## 2022-02-07 ASSESSMENT — CUP TO DISC RATIO: OS_RATIO: 0.1

## 2022-02-07 ASSESSMENT — CONF VISUAL FIELD
OD_NORMAL: 1
METHOD: COUNTING FINGERS
OS_NORMAL: 1

## 2022-02-07 NOTE — PROGRESS NOTES
ANTICOAGULATION MANAGEMENT     Edson Thornton 56 year old male is on warfarin with subtherapeutic INR result. (Goal INR 2.0-3.0)    Recent labs: (last 7 days)     02/07/22  0808   INR 1.17*       ASSESSMENT     Source(s): Chart Review     Warfarin doses taken: Reviewed in chart  Diet: Unable to assess and request a call back if this has changed  New illness, injury, or hospitalization: No  Medication/supplement changes: None noted  Signs or symptoms of bleeding or clotting: No  Previous INR: Subtherapeutic  Additional findings: Bridging with Enoxaparin until INR >= 2.0 Continue with Lovenox 60mg Q 12 Hours.     PLAN     Recommended plan for no diet, medication or health factor changes affecting INR     Dosing Instructions: Booster dose then Increase your warfarin dose (17.9% change) with next INR in 3 days       Summary  As of 2/7/2022    Full warfarin instructions:  2/7: 8 mg; Otherwise 4 mg every Sun, Thu; 5 mg all other days   Next INR check:  2/10/2022             Detailed voice message left for Edson with dosing instructions and follow up date.     Lab visit scheduled    Education provided: Please call back if any changes to your diet, medications or how you've been taking warfarin and Contact [phone] with any changes, questions or concerns.     Plan made with [facility] Pharmacist Usha Choi, RN  Anticoagulation Clinic  2/7/2022    _______________________________________________________________________     Anticoagulation Episode Summary     Current INR goal:  2.0-3.0   TTR:  25.0 % (1 mo)   Target end date:  Indefinite   Send INR reminders to:  [facility]    Indications    Atrial fibrillation  unspecified type (H) [I48.91]           Comments:  [facility] P: [phone]  Transplant Labs twice a week  Staying Local: [facility] then back to [place]         Anticoagulation Care Providers     Provider Role Specialty Phone number    Abiodun Woods MD Referring  Pulmonary Disease [phone]

## 2022-02-07 NOTE — PROGRESS NOTES
1. 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 He is a  and reports that he is unable to drive for three years because of his stroke and lung issues. Discussed that if he applied for class C license we would be able to attest that he meets criteria.     2. Binocular diagonal diplopia/skew deviation likely also secondary to scattered posterior circulation infarcts - patient reports improvement in binocular diplopia since its onset after stroke. Hyper reverses with lateral gazes. Patient reports that he is managing well at this time. Discussed that prism is not a good option because of the hypertropia reversal. Advised occlusive therapy for now. Skew may spontaneously improve as time goes on.    3. Right optic neuropathy - Question whether patient had anterior ischemic optic neuropathy (AION) at some point in the past given small C/D ratio. No compressive etiology on MRI brain. Patient reports daily headaches on top of the head but nothing in the temples. Strong TA pulses bilaterally. Otherwise GCA ROS negative. Patient has RA and ILD and is on chronic prednisone thus there is no utility in checking inflammatory markers. All these factors in combination with mild peripheral field loss in the right eye GCA is considered extremely unlikely.  Patient reports a chicken pox infection in the LEFT eye and is sure he has never had an issues in the right eye. No history of discrete episodic painful vision loss in the right eye. Unilateral glaucoma was considered however his injury follows ISNT rule with 10/11 color plated in the right eye. No APD noted by technician but will re-evaluate this at next visit.     Will recheck in 4-6 weeks to ensure no evolution of right eye optic neuropathy and to re-assess skew.     Edson BACA  Norberto is a pleasant 56 year old White male who presents to my neuro-ophthalmology clinic today for evaluation of vision    HPI:  Patient presenting for evaluation of field loss and diplopia that occurred after bilateral lung transplants in October.     Today complains of right inferior hemiquadrant loss. This has not changed since onset during hospitalization. Also reports binocular diagonal diplopia that is worse at the end of the day. Denies ptosis, dysphagia, dysarthria. Still gets SOB but has significant lung history.     He is a  by trade and reports that he cannot drive for 3 years because of his health issues in addition to his vision problems.    He gets frequent headaches on the top of his head but denies jaw claudication, new or worsening hip or shoulder girdle pain. Recent weight loss since his hospitalization.       Independent historians: Patient    Review of outside testing:  MRI brain wwo contrast 10/23/21  Impression:  Multifocal subacute infarcts within both cerebral hemispheres and left  cerebellum all of which appear present on prior head CT 10/22/2021.  Minimal petechial hemorrhage associated with the infarct in the left  occipital lobe.     I have personally reviewed the examination and initial interpretation  and I agree with the findings.    MRI brain wwo contrast 10/26/21                                                                  Impression:  1. Evolving acute age multifocal acute infarctions in both cerebral  hemispheres and left cerebellum. No new areas of infarction when  compared with prior MRI brain on 10/23/2021.  2. Minimally increased punctate regions of susceptibility effect  within areas of infarction, corresponding to petechial hemorrhage  within previously identified multifocal acute infarctions.  3. Head MRA demonstrates no definite aneurysm or stenosis of the major  intracranial arteries.  4. Neck MRA demonstrates patent major cervical arteries.     My  interpretation performed today of outside testing: Agree with above    Review of outside clinical notes: Reviewed noted from admission in 10/2021    Past ocular history: chickenpox affecting vision in the left eye    FHx: retinal detachment in father, no glaucoma, no AMD that he knows of  RA, DMII,     Medical History: ILD, RA, Bilateral lung transplant, A fib on Coumadin + Lovenox, strokes     Family history / social history:  EtOH: none  Tobacco: no smoking  Drugs: no drugs    Exam:  Visual acuity with correction was 20/20 in the right eye and 20/20 in the left eye. IOP was 18 in the right eye and 18 in the left eye. Visual fields were full in both eyes. Ocular motility was full in all gaze directions. Color plates were 10/11 in the right eye and 11/11 in the left eye.  Pupils were equal, round and reactive to light with no RAPD.     Strabismus exam: right HT flick in primary with worsening in right gaze and reversal in left gaze.  Anterior segment exam: Right eye traumatic changes including periorbital edema, subconj heme  Dilated fundus exam: right optic nerve head pallor to rim temporally with cupping, left nerve head healthy with small C/D ratio.    2+ TA pulses.     Tests ordered and interpreted today:  OCT rNFL demonstrated a mean NFL thickness of 70 in the right eye and 82 in the left eye. Thickness profile demonstrated thinning in the superior temporal inferior quadrant(s) in the right eye and normal thickness in all quadrants in the left eye.  GCL loss in the right eye.   VF: DMV field was performed. This demosntrated a right homonymous inferior hemiquadrantopia with generalized peripheral field constriction in the right eye.         73 minutes spent on the date of encounter doing chart review, history and exam, documentation, and further activities as noted above.     Barrie Elizondo, DO   PGY-5 Neuro-Ophthalmology Fellow    Attending Physician Attestation:  Complete documentation of historical and exam  elements from today's encounter can be found in the full encounter summary report (not reduplicated in this progress note).  I personally obtained the chief complaint(s) and history of present illness.  I confirmed and edited as necessary the review of systems, past medical/surgical history, family history, social history, and examination findings as documented by others; and I examined the patient myself.  I personally reviewed the relevant tests, images, and reports as documented above.  I formulated and edited as necessary the assessment and plan and discussed the findings and management plan with the patient and family. - Barrie Elizondo, DO

## 2022-02-08 DIAGNOSIS — D84.9 IMMUNOSUPPRESSED STATUS (H): ICD-10-CM

## 2022-02-08 DIAGNOSIS — Z94.2 S/P LUNG TRANSPLANT (H): Primary | ICD-10-CM

## 2022-02-08 DIAGNOSIS — I48.91 ATRIAL FIBRILLATION, UNSPECIFIED TYPE (H): ICD-10-CM

## 2022-02-08 LAB — EBV DNA # SPEC NAA+PROBE: NOT DETECTED COPIES/ML

## 2022-02-09 LAB
DONOR IDENTIFICATION: NORMAL
DQB5: 1874
DSA COMMENTS: NORMAL
DSA PRESENT: YES
DSA TEST METHOD: NORMAL
ORGAN: NORMAL
SA 1 CELL: NORMAL
SA 1 TEST METHOD: NORMAL
SA 2 CELL: NORMAL
SA 2 TEST METHOD: NORMAL
SA1 HI RISK ABY: NORMAL
SA1 MOD RISK ABY: NORMAL
SA2 HI RISK ABY: NORMAL
SA2 MOD RISK ABY: NORMAL
UNACCEPTABLE ANTIGENS: NORMAL
UNOS CPRA: 26
ZZZSA 1  COMMENTS: NORMAL
ZZZSA 2 COMMENTS: NORMAL

## 2022-02-10 ENCOUNTER — ANCILLARY PROCEDURE (OUTPATIENT)
Dept: GENERAL RADIOLOGY | Facility: CLINIC | Age: 57
End: 2022-02-10
Attending: INTERNAL MEDICINE
Payer: COMMERCIAL

## 2022-02-10 ENCOUNTER — LAB (OUTPATIENT)
Dept: LAB | Facility: CLINIC | Age: 57
End: 2022-02-10
Attending: INTERNAL MEDICINE
Payer: COMMERCIAL

## 2022-02-10 ENCOUNTER — ANTICOAGULATION THERAPY VISIT (OUTPATIENT)
Dept: ANTICOAGULATION | Facility: CLINIC | Age: 57
End: 2022-02-10

## 2022-02-10 ENCOUNTER — HOME INFUSION (PRE-WILLOW HOME INFUSION) (OUTPATIENT)
Dept: PHARMACY | Facility: CLINIC | Age: 57
End: 2022-02-10

## 2022-02-10 ENCOUNTER — OFFICE VISIT (OUTPATIENT)
Dept: TRANSPLANT | Facility: CLINIC | Age: 57
End: 2022-02-10
Attending: INTERNAL MEDICINE
Payer: COMMERCIAL

## 2022-02-10 VITALS
HEART RATE: 97 BPM | BODY MASS INDEX: 25.44 KG/M2 | SYSTOLIC BLOOD PRESSURE: 136 MMHG | TEMPERATURE: 98.5 F | DIASTOLIC BLOOD PRESSURE: 92 MMHG | OXYGEN SATURATION: 95 % | WEIGHT: 149 LBS

## 2022-02-10 DIAGNOSIS — B00.2 PRIMARY HSV INFECTION OF MOUTH: ICD-10-CM

## 2022-02-10 DIAGNOSIS — E11.65 TYPE 2 DIABETES MELLITUS WITH HYPERGLYCEMIA, WITH LONG-TERM CURRENT USE OF INSULIN (H): ICD-10-CM

## 2022-02-10 DIAGNOSIS — Z94.2 S/P LUNG TRANSPLANT (H): Primary | ICD-10-CM

## 2022-02-10 DIAGNOSIS — Z79.4 TYPE 2 DIABETES MELLITUS WITH HYPERGLYCEMIA, WITH LONG-TERM CURRENT USE OF INSULIN (H): ICD-10-CM

## 2022-02-10 DIAGNOSIS — I48.91 ATRIAL FIBRILLATION, UNSPECIFIED TYPE (H): ICD-10-CM

## 2022-02-10 DIAGNOSIS — I48.91 ATRIAL FIBRILLATION, UNSPECIFIED TYPE (H): Primary | ICD-10-CM

## 2022-02-10 DIAGNOSIS — M06.9 RHEUMATOID ARTHRITIS INVOLVING BOTH HANDS, UNSPECIFIED WHETHER RHEUMATOID FACTOR PRESENT (H): ICD-10-CM

## 2022-02-10 DIAGNOSIS — M05.10: ICD-10-CM

## 2022-02-10 DIAGNOSIS — Z94.2 S/P LUNG TRANSPLANT (H): Chronic | ICD-10-CM

## 2022-02-10 DIAGNOSIS — H53.2 DIPLOPIA: ICD-10-CM

## 2022-02-10 DIAGNOSIS — H92.21 BLOOD IN EAR CANAL, RIGHT: ICD-10-CM

## 2022-02-10 DIAGNOSIS — J84.178: ICD-10-CM

## 2022-02-10 DIAGNOSIS — D84.9 IMMUNOSUPPRESSED STATUS (H): ICD-10-CM

## 2022-02-10 DIAGNOSIS — Z94.2 LUNG REPLACED BY TRANSPLANT (H): ICD-10-CM

## 2022-02-10 LAB
ANION GAP SERPL CALCULATED.3IONS-SCNC: 9 MMOL/L (ref 3–14)
BUN SERPL-MCNC: 14 MG/DL (ref 7–30)
CALCIUM SERPL-MCNC: 9.6 MG/DL (ref 8.5–10.1)
CHLORIDE BLD-SCNC: 106 MMOL/L (ref 94–109)
CO2 SERPL-SCNC: 26 MMOL/L (ref 20–32)
CREAT SERPL-MCNC: 0.9 MG/DL (ref 0.66–1.25)
ERYTHROCYTE [DISTWIDTH] IN BLOOD BY AUTOMATED COUNT: 15.7 % (ref 10–15)
EXPTIME-PRE: 8.16 SEC
FEF2575-%PRED-PRE: 25 %
FEF2575-PRE: 0.72 L/SEC
FEF2575-PRED: 2.83 L/SEC
FEFMAX-%PRED-PRE: 46 %
FEFMAX-PRE: 3.89 L/SEC
FEFMAX-PRED: 8.37 L/SEC
FEV1-%PRED-PRE: 42 %
FEV1-PRE: 1.33 L
FEV1FEV6-PRE: 62 %
FEV1FEV6-PRED: 80 %
FEV1FVC-PRE: 62 %
FEV1FVC-PRED: 79 %
FIFMAX-PRE: 4.51 L/SEC
FVC-%PRED-PRE: 54 %
FVC-PRE: 2.17 L
FVC-PRED: 3.98 L
GFR SERPL CREATININE-BSD FRML MDRD: >90 ML/MIN/1.73M2
GLUCOSE BLD-MCNC: 96 MG/DL (ref 70–99)
HCT VFR BLD AUTO: 40.1 % (ref 40–53)
HGB BLD-MCNC: 12.6 G/DL (ref 13.3–17.7)
INR PPP: 1.9 (ref 0.85–1.15)
MAGNESIUM SERPL-MCNC: 1.4 MG/DL (ref 1.8–2.6)
MCH RBC QN AUTO: 26.3 PG (ref 26.5–33)
MCHC RBC AUTO-ENTMCNC: 31.4 G/DL (ref 31.5–36.5)
MCV RBC AUTO: 84 FL (ref 78–100)
PLATELET # BLD AUTO: 179 10E3/UL (ref 150–450)
POTASSIUM BLD-SCNC: 4.2 MMOL/L (ref 3.4–5.3)
RBC # BLD AUTO: 4.79 10E6/UL (ref 4.4–5.9)
SODIUM SERPL-SCNC: 141 MMOL/L (ref 133–144)
TACROLIMUS BLD-MCNC: 9.2 UG/L (ref 5–15)
TME LAST DOSE: NORMAL H
TME LAST DOSE: NORMAL H
WBC # BLD AUTO: 6.8 10E3/UL (ref 4–11)

## 2022-02-10 PROCEDURE — 94375 RESPIRATORY FLOW VOLUME LOOP: CPT | Performed by: INTERNAL MEDICINE

## 2022-02-10 PROCEDURE — 36415 COLL VENOUS BLD VENIPUNCTURE: CPT | Performed by: PATHOLOGY

## 2022-02-10 PROCEDURE — 71046 X-RAY EXAM CHEST 2 VIEWS: CPT | Mod: GC | Performed by: RADIOLOGY

## 2022-02-10 PROCEDURE — 80197 ASSAY OF TACROLIMUS: CPT | Mod: 90 | Performed by: PATHOLOGY

## 2022-02-10 PROCEDURE — 85610 PROTHROMBIN TIME: CPT | Performed by: PATHOLOGY

## 2022-02-10 PROCEDURE — 99000 SPECIMEN HANDLING OFFICE-LAB: CPT | Performed by: PATHOLOGY

## 2022-02-10 PROCEDURE — G0463 HOSPITAL OUTPT CLINIC VISIT: HCPCS

## 2022-02-10 PROCEDURE — 86833 HLA CLASS II HIGH DEFIN QUAL: CPT | Performed by: PATHOLOGY

## 2022-02-10 PROCEDURE — 80048 BASIC METABOLIC PNL TOTAL CA: CPT | Performed by: PATHOLOGY

## 2022-02-10 PROCEDURE — 86832 HLA CLASS I HIGH DEFIN QUAL: CPT | Performed by: PATHOLOGY

## 2022-02-10 PROCEDURE — 83735 ASSAY OF MAGNESIUM: CPT | Mod: 90 | Performed by: PATHOLOGY

## 2022-02-10 PROCEDURE — 85027 COMPLETE CBC AUTOMATED: CPT | Performed by: PATHOLOGY

## 2022-02-10 PROCEDURE — 87799 DETECT AGENT NOS DNA QUANT: CPT | Mod: 90 | Performed by: PATHOLOGY

## 2022-02-10 PROCEDURE — 99214 OFFICE O/P EST MOD 30 MIN: CPT | Mod: 25 | Performed by: INTERNAL MEDICINE

## 2022-02-10 RX ORDER — VALACYCLOVIR HYDROCHLORIDE 1 G/1
1000 TABLET, FILM COATED ORAL 2 TIMES DAILY
Qty: 28 TABLET | Refills: 0 | Status: SHIPPED | OUTPATIENT
Start: 2022-02-10 | End: 2022-02-28

## 2022-02-10 NOTE — NURSING NOTE
Chief Complaint   Patient presents with     Follow Up     follow up 1 week lung tx       Blood pressure (!) 136/92, pulse 97, temperature 98.5  F (36.9  C), weight 67.6 kg (149 lb), SpO2 95 %.      Autumn Almonte, CMA

## 2022-02-10 NOTE — LETTER
Date:February 10, 2022      Patient was self referred, no letter generated. Do not send.        [facility] Health Information

## 2022-02-10 NOTE — PROGRESS NOTES
"Transplant Coordinator Note    Reason for visit: Post lung transplant follow up visit   Coordinator: Present   Caregiver:  SO    Health concerns addressed today:  1. Respiratory: breathing not back to baseline since bronch. Still coughing blood - sometimes old blood, sometimes new blood (sometimes a little, sometimes a little less than a teaspoon) - improving and less frequent. Shortness of breath with activity.   2. GI:   3. ENT:   4.  Ophthalmology - blind spot on right side - walked into wall, black right eye. ER visit, CT head x 2 done. Double vision.   5. Stroke - right weaker than left. Improving.    6. Mood \"not good\"  7. Not sleeping    Activity/rehab: home OT/PT  Oxygen needs: room air, CPAP NOC  Pain management/RX: denies  CMV status: D-/R-  EBV status: D+/R+  DVT/PE:  Post op AFIB/follow up with EP:  AC/asa: on coumadin  PJP prophylactic: Bactrim    COVID:  1. COVID-19 infection (yes/no, date of most recent positive test):   2. Status/instructions given about COVID-19 vaccine:     Pt Education: medications (use/dose/side effects), how/when to call coordinator, frequency of labs, s/s of infection/rejection, call prior to starting any new medications, lab/vital sign book    Health Maintenance:     Last colonoscopy:     Next colonoscopy due:     Dermatology:    Vaccinations this visit:     Labs, CXR, PFTs reviewed with patient  Medication record reviewed and reconciled  Questions and concerns addressed    Patient Instructions  1. Continue to hydrate with 60-70 oz fluids daily.  2. Continue to exercise daily or most days of the week.  3. Follow up with your primary care provider for annual gender health maintenance procedures.  4. Follow up with colonoscopy schedule.  5. Follow up with annual dermatology visits.  6. It doesn't seem like the COVID vaccine is working well in lung transplant patients. A number of lung transplant patients have gotten sick with COVID even after receiving the vaccines.  Based on " our recent experience, it can be life-threatening to get COVID  even after being vaccinated. Please continue to act like you did not get the COVID vaccine - social distancing, wearing a mask, good hand hygiene, etc. If the people around you are vaccinated, it will help reduce the risk of you getting COVID. All members of your household should be vaccinated.  7. Start Valtrex 1000mg twice a day for 14 days for the herpes.     Next transplant clinic appointment: 2 weeks with CXR, labs and PFTs  Next lab draw: weekly      AVS printed at time of check out

## 2022-02-10 NOTE — PROGRESS NOTES
This is a recent snapshot of the patient's [facility] medical record.  For current drug dose and complete information and questions, call [phone] or In Basket pool, [facility] home infusion ([id number])  CSN Number:  [id number]

## 2022-02-10 NOTE — PROGRESS NOTES
[facility]  Lung Transplant Clinic - Return Visit -  February 10, 2022         Assessment and Plan:   Edson Thornton is a 56 year old male with history NSIP/ILD, bronchiectasis, moderate PH, RA, SALTY, chronic HSV infection, hypogammaglobulinemia, steroid-induced diabetes, hypothyroidism, PFO, HTN, HLD, duodenal anomaly, anxiety, and depression. Admitted on 9/5/21 from OSH for acute on chronic respiratory failure 2/2 ILD exacerbation, underwent BSLT on 10/16/21. Prolonged vent wean s/p trach and PEG/J tube.  Post-op course also complicated by encephalopathy and diffuse weakness, acute to subacute CVA, afib with RVR, BRENNAN, GI bleed, Candidemia/Candida empyema, and anxiety. Code called 11/2 for bradycardia/asystole, progressive hypotension, found to have significant GI bleed, EGD with adherent clot near PEG tube site that was clipped.  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    # status post bilateral lung transplantation for ILD on 10/2021.  -Today, he states that he has been a little more dyspneic since his bronchoscopy.  He has been coughing up blood ranging from bright red blood to rust colored, but is overall decreasing.  -CXR, personally reviewed, shows improvement in his right basilar opacity seen on his post bronc CXR.  No new infiltrates or effusions are noted.  -DQ B5 DSA has been decreasing in MFI, last from 2/7 MFI 1,874.  CMV was negative from 2/7.  Repeat CMV and EBV is pending from today.  Immunosuppression:  - Prednisone, tacrolimus and CellCept 1250 mg twice daily.  -Tacrolimus goal is 8-12.  Continue to monitor intensively for toxicity.  AM dose (mg) PM dose (mg)    12/5/21 10 10   1/2/22 10 7.5   1/30/22 7.5 7.5   2/27/22 7.5 5   3/27/22 5 5   4/24/22 5 2.5   Prophylaxis:  - Bactrim for PJP and Nocardia.      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   -Discussed findings with Lionel Mckeon and Chuck.  Given stability and PFTs, imaging, and decreasing in the thighs will continue to monitor DSA for now.    Date DSA mfi Biopsy (date) Treatment   10/17/2021  none         10/23/2021  none         11/1/2021  none         11/15/2021  none         11/29/2021  DQ B5  2522       12/6/2021  DQ B5  1873       12/12/2021  DQ B5  1703       12/27/2021  DQ B5  3924       1/3/2022  DQ B5  3072       1/10/2022  DQ B5  4032       1/17/2022  DQ B5  1849        2/3/2022 DQB5   2488 1/26 2/7/2022 DQB5 1874       # Double vision and blind spots  - Was referred to ophthalmology     # Rheumatoid arthritis  - Rheum referral, has an appointment on 4/26.      # Afib w/ RVR  - Was on Amio perioperatively.  Metoprolol and Amio were both discontinued 2/2 bradycardia in November.   - He is anticoagulated with warfarin, due to f/u with EP    # Klebsiella PNA w/ cavitation  - Competed Levofloxacin    # Disseminated candidemia, candida empyema  - IV fluconazole, transitioned to PO Fluconazole now discontinued after his last CTs.     # SALTY   - On CPAP.  Will need follow-up with sleep medicine.    # HTN  -Amlodipine 5 mg daily    # Depression / anxiety  - On Lexapro 20 mg daily, reports stable mood.    # Hypothyroidism  - Levothyroxine    # Multifocal acute to subacute ischemic stroke   - MRI brain 10/23 with infarcts noted in both cerebral hemispheres, left cerebellum, presumed associated with new Afib vs perioperative. Bilateral upper extremity paresis (R>L), suspicion for some cortical blindness. SBP goal < 140.   - Continue warfarin, HTN and BS control and rosuvastatin  - Needs to schedule f/u with Neurology    # Health maintenance  - He received Evusheld in 1/2022.  Will need Shingrix in the future.       Coordinator/MD: Tania/Chuck    I spent 50 minutes total today, reviewing medical records including progress notes, multiple imaging studies from his previous available records, and documentation.      RTC: 2 weeks     Jody Sifuentes MD [facility]  Pulmonary and Critical Care Medicine        PATIENT PROFILE:     Current age:                    56 year old  Underlying lung disease: Rheumatoid Disease    Transplant date(s):        10/16/2021 (Lung)  Transplant POD(s):        117  Lung transplant type(s): Bilateral Sequential Lung      Transplant coordinator:   Mady Marin  Transplant provider(s):    Negin Snyder, Yanick Corral, Abiodun Woods    Active Patient Thresholds     Lab Low High Effective Since Comment    Tacrolimus Level 8 12 11/12/2021               Interval History:   Edson Thornton is a 56 year old male with history of NSIP/ILD, bronchiectasis, moderate PH, RA, SALTY, chronic HSV infection, hypogammaglobulinemia, steroid-induced diabetes, hypothyroidism, PFO, HTN, HLD, duodenal anomaly, anxiety, and depression. Admitted on 9/5/21 from OSH for acute on chronic respiratory failure 2/2 ILD exacerbation, underwent BSLT on 10/16/21. Prolonged vent wean s/p trach and PEG/J tube.  Post-op course also complicated by encephalopathy and diffuse weakness, acute to subacute CVA, afib with RVR, BRENNAN, GI bleed, Candidemia/Candida empyema, and anxiety. Code called 11/2 for bradycardia/asystole, progressive hypotension, found to have significant GI bleed, EGD with adherent clot near PEG tube site that was clipped.  The patient had a positive crossmatch following transplant which was attributed to rituximab patient had received in June 2021 but subsequently has had consistently positive DS    Since his last visit, he has unfortunately suffered trauma to his right eye after turning into a corner on a wall.  He states that he has blind spots in his right eye since his strokes, and does not see well  sometimes.  He was evaluated at the ED, had 2 head CTs which were both negative.  He reports residual pain, no changes in his vision currently.      He also states that since his bronchoscopy, his breathing seems to be more labored.  He was initially coughing up bright red blood, no mixed between bright red blood and rust colored.  At times it is just blood, is big is less than a teaspoon, but overall improving.    [facility] still [facility] in [facility], and is receiving home PT sessions.  He is using a wheelchair today, he states that he only uses this when he goes to the doctor's offices.    In terms of his rheumatoid arthritis, he occasionally experiences morning stiffness in his PCP that can last more than an hour.  He does not notice any difference with decreasing steroid doses.  He has an appointment with rheumatology scheduled in April.          Review of Systems:   Please see HPI, otherwise the complete 10 point ROS is negative.           Past Medical and Surgical History:     Past Medical History:   Diagnosis Date     BRENNAN (acute kidney injury) (H) 10/17/2021     Anxiety      Depression      HLD (hyperlipidemia)      HTN (hypertension)      Hypothyroidism      ILD (interstitial lung disease) (H)      SALTY on CPAP      Oxygen dependent     BL 4L since ~6/2021     Rheumatoid arthritis (H)     signs ~5/2020, dx 5/2021     S/P lung transplant (H) 10/16/2021     Shock liver 10/17/2021     Steroid-induced hyperglycemia      Traction bronchiectasis (H)      Past Surgical History:   Procedure Laterality Date     BRONCHOSCOPY (RIGID OR FLEXIBLE), DIAGNOSTIC N/A 1/26/2022    Procedure: BRONCHOSCOPY, WITH BRONCHOALVEOLAR LAVAGE AND BIOPSY;  Surgeon: Perlman, David Morris, MD;  Location:  GI     COLONOSCOPY W/ BIOPSIES AND POLYPECTOMY  07/21/2020     CV CORONARY ANGIOGRAM N/A 09/08/2021    Procedure: Coronary Angiogram with possible intervention;  Surgeon: Jovon Bullock MD;  Location:  HEART CARDIAC CATH LAB     CV  RIGHT HEART CATH MEASUREMENTS RECORDED N/A 09/08/2021    Procedure: Right Heart Cath;  Surgeon: Jovon Bullock MD;  Location:  HEART CARDIAC CATH LAB     ESOPHAGOSCOPY, GASTROSCOPY, DUODENOSCOPY (EGD), COMBINED N/A 10/23/2021    Procedure: ESOPHAGOGASTRODUODENOSCOPY (EGD);  Surgeon: Miquel Pisano MD;  Location:  GI     ESOPHAGOSCOPY, GASTROSCOPY, DUODENOSCOPY (EGD), COMBINED N/A 11/02/2021    Procedure: ESOPHAGOGASTRODUODENOSCOPY (EGD);  Surgeon: Daniel Ortiz MD;  Location:  GI     ESOPHAGOSCOPY, GASTROSCOPY, DUODENOSCOPY (EGD), COMBINED N/A 11/5/2021    Procedure: ESOPHAGOGASTRODUODENOSCOPY (EGD);  Surgeon: Ronnell Hernandez MD;  Location:  GI     EXTRACTION(S) DENTAL N/A 09/22/2021    Procedure: EXTRACTION tooth #19;  Surgeon: Deepak Tobin DDS;  Location: [facility] OR     HERNIA REPAIR       IR CHEST TUBE PLACEMENT NON-TUNNELLED LEFT  11/03/2021     PICC TRIPLE LUMEN PLACEMENT Left 11/04/2021    5FR TL PICC. Right non occlusive thrombus subclavian vein.     REPLACE GASTROJEJUNOSTOMY TUBE, PERCUTANEOUS N/A 11/9/2021    Procedure: REPLACEMENT, GASTROJEJUNOSTOMY TUBE, PERCUTANEOUS;  Surgeon: Hernando Rodriguez MD;  Location: [facility] GI     right acl       TRACHEOSTOMY PERCUTANEOUS N/A 10/29/2021    Procedure: Percutaneous Tracheostomy,;  Surgeon: Celine Jenkins MD;  Location: [facility] OR     TRANSPLANT LUNG RECIPIENT SINGLE X2 Bilateral 10/16/2021    Procedure: TRANSPLANT, LUNG, RECIPIENT, BILATERAL, Bronchoscopy, on-pump perfusion, bilateral clamshell sternotomy;  Surgeon: Yanick Corral MD;  Location: [facility] OR     XR ACROMIOCLAVICULAR JOINT BILATERAL             Family History:     Family History   Problem Relation Age of Onset     Diabetes Type 1 Mother      Heart Disease Mother      Chronic Obstructive Pulmonary Disease Mother      Rheumatoid Arthritis Father      Emphysema Paternal Grandfather             Social History:     Social History     Socioeconomic History      Marital status: Single     Spouse name: Not on file     Number of children: Not on file     Years of education: Not on file     Highest education level: Not on file   Occupational History     Not on file   Tobacco Use     Smoking status: Never Smoker     Smokeless tobacco: Never Used   Substance and Sexual Activity     Alcohol use: Never     Drug use: Never     Sexual activity: Not on file   Other Topics Concern     Parent/sibling w/ CABG, MI or angioplasty before 65F 55M? Not Asked   Social History Narrative     Not on file     Social Determinants of Health     Financial Resource Strain: Not on file   Food Insecurity: Not on file   Transportation Needs: Not on file   Physical Activity: Not on file   Stress: Not on file   Social Connections: Not on file   Intimate Partner Violence: Not on file   Housing Stability: Not on file            Medications:     Current Outpatient Medications   Medication     acetaminophen (TYLENOL) 325 MG tablet     amLODIPine (NORVASC) 5 MG tablet     calcium carbonate 600 mg-vitamin D 400 units (CALTRATE) 600-400 MG-UNIT per tablet     enoxaparin ANTICOAGULANT (LOVENOX) 60 MG/0.6ML syringe     enoxaparin ANTICOAGULANT (LOVENOX) 60 MG/0.6ML syringe     escitalopram (LEXAPRO) 20 MG tablet     fluticasone (FLONASE) 50 MCG/ACT nasal spray     folic acid-vit B6-vit B12 (FOLGARD) 0.8-10-0.115 MG TABS per tablet     insulin glargine (LANTUS PEN) 100 UNIT/ML pen     levalbuterol (XOPENEX HFA) 45 MCG/ACT inhaler     levothyroxine (SYNTHROID/LEVOTHROID) 25 MCG tablet     magnesium oxide (MAG-OX) 400 MG tablet     Melatonin 10 MG TABS tablet     multivitamin w/minerals (THERA-VIT-M) tablet     mycophenolate (GENERIC EQUIVALENT) 250 MG capsule     nystatin (MYCOSTATIN) 058171 UNIT/ML suspension     ondansetron (ZOFRAN-ODT) 4 MG ODT tab     pantoprazole (PROTONIX) 40 MG EC tablet     predniSONE (DELTASONE) 5 MG tablet     rosuvastatin (CRESTOR) 10 MG tablet     sulfamethoxazole-trimethoprim (BACTRIM)  400-80 MG tablet     tacrolimus (GENERIC EQUIVALENT) 0.5 MG capsule     tacrolimus (GENERIC EQUIVALENT) 1 MG capsule     valACYclovir (VALTREX) 1000 mg tablet     warfarin ANTICOAGULANT (COUMADIN) 1 MG tablet     warfarin ANTICOAGULANT (COUMADIN) 2 MG tablet     No current facility-administered medications for this visit.            Physical Exam:   BP (!) 136/92   Pulse 97   Temp 98.5  F (36.9  C)   Wt 67.6 kg (149 lb)   SpO2 95%   BMI 25.44 kg/m      GENERAL: alert, NAD  HEENT: NCAT, ecchymosis surrounding the right orbit.    Neck: no cervical or supraclavicular adenopathy  Lungs: good air flow, no crackles, rhonchi or wheezing  CV: RRR, S1S2, no murmurs noted  Abdomen: normoactive BS, soft, non tender   Neuro: AAO X 3  Psychiatric: normal affect, good eye contact  Skin: no rash, jaundice or lesions on limited exam  Extremities: No clubbing, cyanosis or edema.         Data:   All laboratory and imaging data reviewed.      PFT interpretation: February 10, 2022  Maneuver: valid and meets ATS guidelines  Spirometry: FEV1/FVC 62.  FVC is 54% predicted, suggestive of moderate restriction.   Compared to his last exam on 1/25, there is no significant change.

## 2022-02-10 NOTE — PATIENT INSTRUCTIONS
~~~~~~~~~~~~~~~~~~~~~~~~~    Thoracic Transplant Office phone [phone], fax [phone]    Office Hours 8:30 - 5:00     For after-hours urgent issues, please dial [phone], and ask to speak with the Thoracic Transplant Coordinator On-Call.  --------------------  To expedite your medication refill(s), please contact your pharmacy and have them fax a refill request to: [phone]  .   *Please allow 3 business days for routine medication refills.  *Please allow 5 business days for controlled substance medication refills.    **For Diabetic medications and supplies refill(s), please contact your pharmacy and have them contact your Endocrine team.  --------------------  For scheduling appointments call [phone].  --------------------  Please Note: If you are active on [employer], all future test results will be sent by [employer] message only, and will no longer be called to patient. You may also receive communication directly from your physician.

## 2022-02-10 NOTE — PROGRESS NOTES
ANTICOAGULATION MANAGEMENT     Edson Thornton 56 year old male is on warfarin with subtherapeutic INR result. (Goal INR 2.0-3.0)    Recent labs: (last 7 days)     02/10/22  1206   INR 1.90*       ASSESSMENT     Source(s): Chart Review and Patient/Caregiver Call     Warfarin doses taken: Warfarin taken as instructed  Diet: No new diet changes identified  New illness, injury, or hospitalization: No  Medication/supplement changes: Patient stopped Diflucan on 1/18/22  Signs or symptoms of bleeding or clotting: No  Previous INR: Supratherapeutic  Additional findings: Patient is bridging with Lovenox 60mg BID.  Patient has enough Lovenox to get him through the weekend.      PLAN     Recommended plan for no diet, medication or health factor changes affecting INR     Dosing Instructions: Continue your current warfarin dose with next INR in 4 days       Summary  As of 2/10/2022    Full warfarin instructions:  4 mg every Sun, Thu; 5 mg all other days   Next INR check:  2/14/2022             Telephone call with Edson who verbalizes understanding and agrees to plan and who agrees to plan and repeated back plan correctly    Orders given to  Homecare nurse/facility to recheck    Education provided: Lovenox/Heparin education provided: prescribed dose and frequency     Plan made per [facility] anticoagulation protocol    Terrie Cedillo RN  Anticoagulation Clinic  2/10/2022    _______________________________________________________________________     Anticoagulation Episode Summary     Current INR goal:  2.0-3.0   TTR:  22.6 % (1.1 mo)   Target end date:  Indefinite   Send INR reminders to:  [facility]    Indications    Atrial fibrillation  unspecified type (H) [I48.91]           Comments:  [facility] P: [phone]  Transplant Labs twice a week  Staying Local: [facility] then back to [place]         Anticoagulation Care Providers     Provider Role Specialty Phone number    Abiodun Woods MD  Referring Pulmonary Disease [phone]

## 2022-02-10 NOTE — LETTER
2/10/2022         RE: Edson Thornton  [address]  [place] [address]        Dear Colleague,    Thank you for referring your patient, Edson Thornton, to the [facility] TRANSPLANT [facility]. Please see a copy of my visit note below.    [facility]  Lung Transplant Clinic - Return Visit -  February 10, 2022         Assessment and Plan:   Edson Thornton is a 56 year old male with history NSIP/ILD, bronchiectasis, moderate PH, RA, SALTY, chronic HSV infection, hypogammaglobulinemia, steroid-induced diabetes, hypothyroidism, PFO, HTN, HLD, duodenal anomaly, anxiety, and depression. Admitted on 9/5/21 from OSH for acute on chronic respiratory failure 2/2 ILD exacerbation, underwent BSLT on 10/16/21. Prolonged vent wean s/p trach and PEG/J tube.  Post-op course also complicated by encephalopathy and diffuse weakness, acute to subacute CVA, afib with RVR, BRENNAN, GI bleed, Candidemia/Candida empyema, and anxiety. Code called 11/2 for bradycardia/asystole, progressive hypotension, found to have significant GI bleed, EGD with adherent clot near PEG tube site that was clipped.  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    # status post bilateral lung transplantation for ILD on 10/2021.  -Today, he states that he has been a little more dyspneic since his bronchoscopy.  He has been coughing up blood ranging from bright red blood to rust colored, but is overall decreasing.  -CXR, personally reviewed, shows improvement in his right basilar opacity seen on his post [facility] CXR.  No new infiltrates or effusions are noted.  -DQ B5 DSA has been decreasing in MFI, last from 2/7 MFI 1,874.  CMV was negative from 2/7.  Repeat CMV and EBV is pending from today.  Immunosuppression:  - Prednisone, tacrolimus and CellCept 1250 mg  twice daily.  -Tacrolimus goal is 8-12.  Continue to monitor intensively for toxicity.  AM dose (mg) PM dose (mg)    12/5/21 10 10   1/2/22 10 7.5   1/30/22 7.5 7.5   2/27/22 7.5 5   3/27/22 5 5   4/24/22 5 2.5   Prophylaxis:  - Bactrim for PJP and Nocardia.     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   -Discussed findings with Lionel Mckeon and Chuck.  Given stability and PFTs, imaging, and decreasing in the thighs will continue to monitor DSA for now.    Date DSA mfi Biopsy (date) Treatment   10/17/2021  none         10/23/2021  none         11/1/2021  none         11/15/2021  none         11/29/2021  DQ B5  2522       12/6/2021  DQ B5  1873       12/12/2021  DQ B5  1703       12/27/2021  DQ B5  3924       1/3/2022  DQ B5  3072       1/10/2022  DQ B5  4032       1/17/2022  DQ B5  1849        2/3/2022 DQB5   2488 1/26 2/7/2022 DQB5 1874       # Double vision and blind spots  - Was referred to ophthalmology     # Rheumatoid arthritis  - Rheum referral, has an appointment on 4/26.      # Afib w/ RVR  - Was on Amio perioperatively.  Metoprolol and Amio were both discontinued 2/2 bradycardia in November.   - He is anticoagulated with warfarin, due to f/u with EP    # Klebsiella PNA w/ cavitation  - Competed Levofloxacin    # Disseminated candidemia, candida empyema  - IV fluconazole, transitioned to PO Fluconazole now discontinued after his last CTs.     # SALTY   - On CPAP.  Will need follow-up with sleep medicine.    # HTN  -Amlodipine 5 mg daily    # Depression / anxiety  - On Lexapro 20 mg daily, reports stable mood.    # Hypothyroidism  - Levothyroxine    # Multifocal acute to subacute ischemic stroke   - MRI brain 10/23 with infarcts noted in both cerebral hemispheres, left cerebellum, presumed associated with new Afib vs perioperative. Bilateral upper extremity paresis (R>L),  suspicion for some cortical blindness. SBP goal < 140.   - Continue warfarin, HTN and BS control and rosuvastatin  - Needs to schedule f/u with Neurology    # Health maintenance  - He received Evusheld in 1/2022.  Will need Shingrix in the future.      Coordinator/MD: Tania/Chuck    I spent 50 minutes total today, reviewing medical records including progress notes, multiple imaging studies from his previous available records, and documentation.      RTC: 2 weeks     Jody Sifuentes MD [facility]  Pulmonary and Critical Care Medicine        PATIENT PROFILE:     Current age:                    56 year old  Underlying lung disease: Rheumatoid Disease    Transplant date(s):        10/16/2021 (Lung)  Transplant POD(s):        117  Lung transplant type(s): Bilateral Sequential Lung      Transplant coordinator:   Mady Marin  Transplant provider(s):    Aditya Butler, Negin Kumar, Yanick Corral, Abioudn Woods    Active Patient Thresholds     Lab Low High Effective Since Comment    Tacrolimus Level 8 12 11/12/2021               Interval History:   Edson Thornton is a 56 year old male with history of NSIP/ILD, bronchiectasis, moderate PH, RA, SALTY, chronic HSV infection, hypogammaglobulinemia, steroid-induced diabetes, hypothyroidism, PFO, HTN, HLD, duodenal anomaly, anxiety, and depression. Admitted on 9/5/21 from OSH for acute on chronic respiratory failure 2/2 ILD exacerbation, underwent BSLT on 10/16/21. Prolonged vent wean s/p trach and PEG/J tube.  Post-op course also complicated by encephalopathy and diffuse weakness, acute to subacute CVA, afib with RVR, BRENNAN, GI bleed, Candidemia/Candida empyema, and anxiety. Code called 11/2 for bradycardia/asystole, progressive hypotension, found to have significant GI bleed, EGD with adherent clot near PEG tube site that was clipped.  The patient had a positive crossmatch following transplant which was attributed to rituximab patient had received in June 2021 but subsequently  has had consistently positive DS    Since his last visit, he has unfortunately suffered trauma to his right eye after turning into a corner on a wall.  He states that he has blind spots in his right eye since his strokes, and does not see well sometimes.  He was evaluated at the ED, had 2 head CTs which were both negative.  He reports residual pain, no changes in his vision currently.      He also states that since his bronchoscopy, his breathing seems to be more labored.  He was initially coughing up bright red blood, no mixed between bright red blood and rust colored.  At times it is just blood, is big is less than a teaspoon, but overall improving.    He still [facility] in [facility], and is receiving home PT sessions.  He is using a wheelchair today, he states that he only uses this when he goes to the doctor's offices.    In terms of his rheumatoid arthritis, he occasionally experiences morning stiffness in his PCP that can last more than an hour.  He does not notice any difference with decreasing steroid doses.  He has an appointment with rheumatology scheduled in April.          Review of Systems:   Please see HPI, otherwise the complete 10 point ROS is negative.           Past Medical and Surgical History:     Past Medical History:   Diagnosis Date     BRENNAN (acute kidney injury) (H) 10/17/2021     Anxiety      Depression      HLD (hyperlipidemia)      HTN (hypertension)      Hypothyroidism      ILD (interstitial lung disease) (H)      SALTY on CPAP      Oxygen dependent     BL 4L since ~6/2021     Rheumatoid arthritis (H)     signs ~5/2020, dx 5/2021     S/P lung transplant (H) 10/16/2021     Shock liver 10/17/2021     Steroid-induced hyperglycemia      Traction bronchiectasis (H)      Past Surgical History:   Procedure Laterality Date     BRONCHOSCOPY (RIGID OR FLEXIBLE), DIAGNOSTIC N/A 1/26/2022    Procedure: BRONCHOSCOPY, WITH BRONCHOALVEOLAR LAVAGE AND BIOPSY;  Surgeon: Perlman, David Morris, MD;  Location:  [facility]     COLONOSCOPY W/ BIOPSIES AND POLYPECTOMY  07/21/2020     CV CORONARY ANGIOGRAM N/A 09/08/2021    Procedure: Coronary Angiogram with possible intervention;  Surgeon: Jovon Bullock MD;  Location:  HEART CARDIAC CATH LAB     CV RIGHT HEART CATH MEASUREMENTS RECORDED N/A 09/08/2021    Procedure: Right Heart Cath;  Surgeon: Jovon Bullock MD;  Location:  HEART CARDIAC CATH LAB     ESOPHAGOSCOPY, GASTROSCOPY, DUODENOSCOPY (EGD), COMBINED N/A 10/23/2021    Procedure: ESOPHAGOGASTRODUODENOSCOPY (EGD);  Surgeon: Miquel Pisano MD;  Location:  GI     ESOPHAGOSCOPY, GASTROSCOPY, DUODENOSCOPY (EGD), COMBINED N/A 11/02/2021    Procedure: ESOPHAGOGASTRODUODENOSCOPY (EGD);  Surgeon: Daniel Ortiz MD;  Location:  GI     ESOPHAGOSCOPY, GASTROSCOPY, DUODENOSCOPY (EGD), COMBINED N/A 11/5/2021    Procedure: ESOPHAGOGASTRODUODENOSCOPY (EGD);  Surgeon: Ronnell Hernandez MD;  Location: [facility]     EXTRACTION(S) DENTAL N/A 09/22/2021    Procedure: EXTRACTION tooth #19;  Surgeon: Deepak Toibn DDS;  Location: [facility] OR     HERNIA REPAIR       IR CHEST TUBE PLACEMENT NON-TUNNELLED LEFT  11/03/2021     PICC TRIPLE LUMEN PLACEMENT Left 11/04/2021    5FR TL PICC. Right non occlusive thrombus subclavian vein.     REPLACE GASTROJEJUNOSTOMY TUBE, PERCUTANEOUS N/A 11/9/2021    Procedure: REPLACEMENT, GASTROJEJUNOSTOMY TUBE, PERCUTANEOUS;  Surgeon: Hernando Rodriguez MD;  Location: [facility]     right acl       TRACHEOSTOMY PERCUTANEOUS N/A 10/29/2021    Procedure: Percutaneous Tracheostomy,;  Surgeon: Celine Jenkins MD;  Location: [facility] OR     TRANSPLANT LUNG RECIPIENT SINGLE X2 Bilateral 10/16/2021    Procedure: TRANSPLANT, LUNG, RECIPIENT, BILATERAL, Bronchoscopy, on-pump perfusion, bilateral clamshell sternotomy;  Surgeon: Yanick Corral MD;  Location: [facility] OR     XR ACROMIOCLAVICULAR JOINT BILATERAL             Family History:     Family History   Problem Relation Age of Onset      Diabetes Type 1 Mother      Heart Disease Mother      Chronic Obstructive Pulmonary Disease Mother      Rheumatoid Arthritis Father      Emphysema Paternal Grandfather             Social History:     Social History     Socioeconomic History     Marital status: Single     Spouse name: Not on file     Number of children: Not on file     Years of education: Not on file     Highest education level: Not on file   Occupational History     Not on file   Tobacco Use     Smoking status: Never Smoker     Smokeless tobacco: Never Used   Substance and Sexual Activity     Alcohol use: Never     Drug use: Never     Sexual activity: Not on file   Other Topics Concern     Parent/sibling w/ CABG, MI or angioplasty before 65F 55M? Not Asked   Social History Narrative     Not on file     Social Determinants of Health     Financial Resource Strain: Not on file   Food Insecurity: Not on file   Transportation Needs: Not on file   Physical Activity: Not on file   Stress: Not on file   Social Connections: Not on file   Intimate Partner Violence: Not on file   Housing Stability: Not on file            Medications:     Current Outpatient Medications   Medication     acetaminophen (TYLENOL) 325 MG tablet     amLODIPine (NORVASC) 5 MG tablet     calcium carbonate 600 mg-vitamin D 400 units (CALTRATE) 600-400 MG-UNIT per tablet     enoxaparin ANTICOAGULANT (LOVENOX) 60 MG/0.6ML syringe     enoxaparin ANTICOAGULANT (LOVENOX) 60 MG/0.6ML syringe     escitalopram (LEXAPRO) 20 MG tablet     fluticasone (FLONASE) 50 MCG/ACT nasal spray     folic acid-vit B6-vit B12 (FOLGARD) 0.8-10-0.115 MG TABS per tablet     insulin glargine (LANTUS PEN) 100 UNIT/ML pen     levalbuterol (XOPENEX HFA) 45 MCG/ACT inhaler     levothyroxine (SYNTHROID/LEVOTHROID) 25 MCG tablet     magnesium oxide (MAG-OX) 400 MG tablet     Melatonin 10 MG TABS tablet     multivitamin w/minerals (THERA-VIT-M) tablet     mycophenolate (GENERIC EQUIVALENT) 250 MG capsule     nystatin  (MYCOSTATIN) 351154 UNIT/ML suspension     ondansetron (ZOFRAN-ODT) 4 MG ODT tab     pantoprazole (PROTONIX) 40 MG EC tablet     predniSONE (DELTASONE) 5 MG tablet     rosuvastatin (CRESTOR) 10 MG tablet     sulfamethoxazole-trimethoprim (BACTRIM) 400-80 MG tablet     tacrolimus (GENERIC EQUIVALENT) 0.5 MG capsule     tacrolimus (GENERIC EQUIVALENT) 1 MG capsule     valACYclovir (VALTREX) 1000 mg tablet     warfarin ANTICOAGULANT (COUMADIN) 1 MG tablet     warfarin ANTICOAGULANT (COUMADIN) 2 MG tablet     No current facility-administered medications for this visit.            Physical Exam:   BP (!) 136/92   Pulse 97   Temp 98.5  F (36.9  C)   Wt 67.6 kg (149 lb)   SpO2 95%   BMI 25.44 kg/m      GENERAL: alert, NAD  HEENT: NCAT, ecchymosis surrounding the right orbit.    Neck: no cervical or supraclavicular adenopathy  Lungs: good air flow, no crackles, rhonchi or wheezing  CV: RRR, S1S2, no murmurs noted  Abdomen: normoactive BS, soft, non tender   Neuro: AAO X 3  Psychiatric: normal affect, good eye contact  Skin: no rash, jaundice or lesions on limited exam  Extremities: No clubbing, cyanosis or edema.         Data:   All laboratory and imaging data reviewed.      PFT interpretation: February 10, 2022  Maneuver: valid and meets ATS guidelines  Spirometry: FEV1/FVC 62.  FVC is 54% predicted, suggestive of moderate restriction.   Compared to his last exam on 1/25, there is no significant change.        Transplant Coordinator Note    Reason for visit: Post lung transplant follow up visit   Coordinator: Present   Caregiver:  SO    Health concerns addressed today:  1. Respiratory: breathing not back to baseline since bronch. Still coughing blood - sometimes old blood, sometimes new blood (sometimes a little, sometimes a little less than a teaspoon) - improving and less frequent. Shortness of breath with activity.   2. GI:   3. ENT:   4.  Ophthalmology - blind spot on right side - walked into wall, black right  "eye. ER visit, CT head x 2 done. Double vision.   5. Stroke - right weaker than left. Improving.    6. Mood \"not good\"  7. Not sleeping    Activity/rehab: home OT/PT  Oxygen needs: room air, CPAP NOC  Pain management/RX: denies  CMV status: D-/R-  EBV status: D+/R+  DVT/PE:  Post op AFIB/follow up with EP:  AC/asa: on coumadin  PJP prophylactic: Bactrim    COVID:  1. COVID-19 infection (yes/no, date of most recent positive test):   2. Status/instructions given about COVID-19 vaccine:     Pt Education: medications (use/dose/side effects), how/when to call coordinator, frequency of labs, s/s of infection/rejection, call prior to starting any new medications, lab/vital sign book    Health Maintenance:     Last colonoscopy:     Next colonoscopy due:     Dermatology:    Vaccinations this visit:     Labs, CXR, PFTs reviewed with patient  Medication record reviewed and reconciled  Questions and concerns addressed    Patient Instructions  1. Continue to hydrate with 60-70 oz fluids daily.  2. Continue to exercise daily or most days of the week.  3. Follow up with your primary care provider for annual gender health maintenance procedures.  4. Follow up with colonoscopy schedule.  5. Follow up with annual dermatology visits.  6. It doesn't seem like the COVID vaccine is working well in lung transplant patients. A number of lung transplant patients have gotten sick with COVID even after receiving the vaccines.  Based on our recent experience, it can be life-threatening to get COVID  even after being vaccinated. Please continue to act like you did not get the COVID vaccine - social distancing, wearing a mask, good hand hygiene, etc. If the people around you are vaccinated, it will help reduce the risk of you getting COVID. All members of your household should be vaccinated.  7. Start Valtrex 1000mg twice a day for 14 days for the herpes.     Next transplant clinic appointment: 2 weeks with CXR, labs and PFTs  Next lab draw: " weekly      AVS printed at time of check out            Again, thank you for allowing me to participate in the care of your patient.        Sincerely,        Jody Sifuentes MD

## 2022-02-11 ENCOUNTER — TELEPHONE (OUTPATIENT)
Dept: TRANSPLANT | Facility: CLINIC | Age: 57
End: 2022-02-11
Payer: COMMERCIAL

## 2022-02-11 DIAGNOSIS — Z94.2 S/P LUNG TRANSPLANT (H): Chronic | ICD-10-CM

## 2022-02-11 LAB
CMV DNA SPEC NAA+PROBE-ACNC: NOT DETECTED IU/ML
DONOR IDENTIFICATION: NORMAL
DQB5: 1781
DSA COMMENTS: NORMAL
DSA PRESENT: YES
DSA TEST METHOD: NORMAL
EBV DNA # SPEC NAA+PROBE: NOT DETECTED COPIES/ML
ORGAN: NORMAL
SA 1 CELL: NORMAL
SA 1 TEST METHOD: NORMAL
SA 2 CELL: NORMAL
SA 2 TEST METHOD: NORMAL
SA1 HI RISK ABY: NORMAL
SA1 MOD RISK ABY: NORMAL
SA2 HI RISK ABY: NORMAL
SA2 MOD RISK ABY: NORMAL
UNACCEPTABLE ANTIGENS: NORMAL
UNOS CPRA: 26
ZZZSA 1  COMMENTS: NORMAL
ZZZSA 2 COMMENTS: NORMAL

## 2022-02-11 RX ORDER — MAGNESIUM OXIDE 400 MG/1
1200 TABLET ORAL 3 TIMES DAILY
Qty: 270 TABLET | Refills: 11 | Status: SHIPPED | OUTPATIENT
Start: 2022-02-11 | End: 2022-02-28

## 2022-02-11 NOTE — TELEPHONE ENCOUNTER
Spoke with patient and home care RN, gave verbal orders for transplant labs to be done next Tu or W including an INR.

## 2022-02-11 NOTE — PROGRESS NOTES
2/11/22 Addendum:  PRN order for an INR faxed to [facility], [place] at [phone].  Mesha Chau RN

## 2022-02-11 NOTE — TELEPHONE ENCOUNTER
Tacrolimus level 9.2 at 16 hours, on 2/10/22.  Goal 8-12.   No dose change, plan to repeat at a 12 hour level next week.     Discussed with patient.    Magnesium 1.4 on 2/10/22. Pt denies diarrhea, but stool is very soft, 1-4 stools per day. Pt is taking magnesium separately from cellcept. Reviewed with Dr. Sifuentes and other magnesium form would require too many tablets to achieve equivalent dose, so plan to increase mag oxide to 1200 mg TID. Recheck labs next week. [facility] is coming later today, he will ask if they can do labs on Tu or W next week and call me back later today.

## 2022-02-14 ENCOUNTER — TELEPHONE (OUTPATIENT)
Dept: TRANSPLANT | Facility: CLINIC | Age: 57
End: 2022-02-14
Payer: COMMERCIAL

## 2022-02-14 ENCOUNTER — ANTICOAGULATION THERAPY VISIT (OUTPATIENT)
Dept: ANTICOAGULATION | Facility: CLINIC | Age: 57
End: 2022-02-14
Payer: COMMERCIAL

## 2022-02-14 DIAGNOSIS — I48.91 ATRIAL FIBRILLATION, UNSPECIFIED TYPE (H): Primary | ICD-10-CM

## 2022-02-14 LAB — INR (EXTERNAL): 2.1 (ref 0.9–1.1)

## 2022-02-14 NOTE — TELEPHONE ENCOUNTER
Patient calls to say he is almost done with home PT, inquired on how to start pulmonary rehab.   Pulmonary rehab was messaged last week to get patient in for evaluation beginning of March when home PT is done. Will follow up with rehab.     Patient verbalized agreement of plan. Will call back with questions, concerns, updates.

## 2022-02-14 NOTE — PROGRESS NOTES
This is a recent snapshot of the patient's [facility] medical record.  For current drug dose and complete information and questions, call [phone] or In Basket pool, fv home infusion ([id number])  CSN Number:  [id number]

## 2022-02-14 NOTE — PROGRESS NOTES
ANTICOAGULATION MANAGEMENT     Edson Thornton 56 year old male is on warfarin with therapeutic INR result. (Goal INR 2.0-3.0)    Recent labs: (last 7 days)     02/14/22  1507   INR 2.1*       ASSESSMENT     Source(s): Chart Review and Home Care/Facility Nurse     Warfarin doses taken: Warfarin taken as instructed  Diet: No new diet changes identified  New illness, injury, or hospitalization: No  Medication/supplement changes: None noted  Signs or symptoms of bleeding or clotting: No  Previous INR: Subtherapeutic  Additional findings: None     PLAN     Recommended plan for no diet, medication or health factor changes affecting INR     Dosing Instructions:  Increase your warfarin dose (9.1% change) with next INR in 3 days       Summary  As of 2/14/2022    Full warfarin instructions:  6 mg every Mon; 5 mg all other days   Next INR check:  2/17/2022             Detailed voice message left for home care nurse Terri with dosing instructions and follow up date.     Orders given to  Homecare nurse/facility to recheck    Education provided: Please call back if any changes to your diet, medications or how you've been taking warfarin, Goal range and significance of current result and Contact [phone] with any changes, questions or concerns.     Plan made with [facility] Pharmacist Usha COLÓN RN  Anticoagulation Clinic  2/14/2022    _______________________________________________________________________     Anticoagulation Episode Summary     Current INR goal:  2.0-3.0   TTR:  25.6 % (1.2 mo)   Target end date:  Indefinite   Send INR reminders to:  [facility]    Indications    Atrial fibrillation  unspecified type (H) [I48.91]           Comments:  [facility] P: [phone]  Transplant Labs twice a week  Staying Local: [facility] then back to [place]         Anticoagulation Care Providers     Provider Role Specialty Phone number    Abiodun Woods MD Referring Pulmonary  [facility] [phone]

## 2022-02-14 NOTE — PROGRESS NOTES
This is a recent snapshot of the patient's [facility] medical record.  For current drug dose and complete information and questions, call [phone] or In Basket pool, fv home infusion ([id number])  CSN Number:  [id number]

## 2022-02-15 NOTE — PROGRESS NOTES
This is a recent snapshot of the patient's [facility] medical record.  For current drug dose and complete information and questions, call [phone] or In Basket pool, fv home infusion ([id number])  CSN Number:  [id number]

## 2022-02-15 NOTE — PROGRESS NOTES
This is a recent snapshot of the patient's [facility] medical record.  For current drug dose and complete information and questions, call [phone] or In Basket pool, fv home infusion ([id number])  CSN Number:  [id number]

## 2022-02-16 ENCOUNTER — TELEPHONE (OUTPATIENT)
Dept: TRANSPLANT | Facility: CLINIC | Age: 57
End: 2022-02-16
Payer: COMMERCIAL

## 2022-02-16 NOTE — TELEPHONE ENCOUNTER
Provider  Call: Justa  Date/Time: 2/16/2022  1007  Reason for call: Pt has been declining visits  They need a call back to discontinue HH aide visit from 1/26/22

## 2022-02-16 NOTE — TELEPHONE ENCOUNTER
Patient has been declining shower assistance with home care.   Order given to discontinue shower assistance.

## 2022-02-17 ENCOUNTER — ANTICOAGULATION THERAPY VISIT (OUTPATIENT)
Dept: ANTICOAGULATION | Facility: CLINIC | Age: 57
End: 2022-02-17

## 2022-02-17 ENCOUNTER — LAB (OUTPATIENT)
Dept: LAB | Facility: CLINIC | Age: 57
End: 2022-02-17
Payer: COMMERCIAL

## 2022-02-17 DIAGNOSIS — D84.9 IMMUNOSUPPRESSED STATUS (H): ICD-10-CM

## 2022-02-17 DIAGNOSIS — I48.91 ATRIAL FIBRILLATION, UNSPECIFIED TYPE (H): Primary | ICD-10-CM

## 2022-02-17 DIAGNOSIS — Z94.2 LUNG REPLACED BY TRANSPLANT (H): ICD-10-CM

## 2022-02-17 LAB
ANION GAP SERPL CALCULATED.3IONS-SCNC: 2 MMOL/L (ref 3–14)
BUN SERPL-MCNC: 15 MG/DL (ref 7–30)
CALCIUM SERPL-MCNC: 9.2 MG/DL (ref 8.5–10.1)
CHLORIDE BLD-SCNC: 108 MMOL/L (ref 94–109)
CO2 SERPL-SCNC: 28 MMOL/L (ref 20–32)
CREAT SERPL-MCNC: 0.82 MG/DL (ref 0.66–1.25)
ERYTHROCYTE [DISTWIDTH] IN BLOOD BY AUTOMATED COUNT: 15.5 % (ref 10–15)
GFR SERPL CREATININE-BSD FRML MDRD: >90 ML/MIN/1.73M2
GLUCOSE BLD-MCNC: 91 MG/DL (ref 70–99)
HCT VFR BLD AUTO: 35.7 % (ref 40–53)
HGB BLD-MCNC: 11.4 G/DL (ref 13.3–17.7)
HOLD SPECIMEN: NORMAL
HOLD SPECIMEN: NORMAL
INR (EXTERNAL): 2.8 (ref 0.9–1.1)
MAGNESIUM SERPL-MCNC: 1.6 MG/DL (ref 1.6–2.3)
MCH RBC QN AUTO: 26.6 PG (ref 26.5–33)
MCHC RBC AUTO-ENTMCNC: 31.9 G/DL (ref 31.5–36.5)
MCV RBC AUTO: 83 FL (ref 78–100)
PLATELET # BLD AUTO: 177 10E3/UL (ref 150–450)
POTASSIUM BLD-SCNC: 3.9 MMOL/L (ref 3.4–5.3)
RBC # BLD AUTO: 4.28 10E6/UL (ref 4.4–5.9)
SODIUM SERPL-SCNC: 138 MMOL/L (ref 133–144)
TACROLIMUS BLD-MCNC: 9.2 UG/L (ref 5–15)
TME LAST DOSE: NORMAL H
TME LAST DOSE: NORMAL H
WBC # BLD AUTO: 6.1 10E3/UL (ref 4–11)

## 2022-02-17 PROCEDURE — 80048 BASIC METABOLIC PNL TOTAL CA: CPT | Performed by: PATHOLOGY

## 2022-02-17 PROCEDURE — 85027 COMPLETE CBC AUTOMATED: CPT | Performed by: PATHOLOGY

## 2022-02-17 PROCEDURE — 99000 SPECIMEN HANDLING OFFICE-LAB: CPT | Performed by: PATHOLOGY

## 2022-02-17 PROCEDURE — 80197 ASSAY OF TACROLIMUS: CPT | Mod: 90 | Performed by: PATHOLOGY

## 2022-02-17 PROCEDURE — 36415 COLL VENOUS BLD VENIPUNCTURE: CPT | Performed by: PATHOLOGY

## 2022-02-17 PROCEDURE — 83735 ASSAY OF MAGNESIUM: CPT | Performed by: PATHOLOGY

## 2022-02-17 NOTE — PROGRESS NOTES
This is a recent snapshot of the patient's [facility] medical record.  For current drug dose and complete information and questions, call [phone] or In Basket pool, fv home infusion ([id number])  CSN Number:  [id number]

## 2022-02-17 NOTE — PROGRESS NOTES
ANTICOAGULATION MANAGEMENT     Edson Thornton 56 year old male is on warfarin with therapeutic INR result. (Goal INR 2.0-3.0)    Recent labs: (last 7 days)     02/17/22  0000   INR 2.8*       ASSESSMENT     Source(s): Chart Review and Home Care/Facility Nurse     Warfarin doses taken: Patient actually took 5mg of warfarin for the past 3 days.   Diet: No new diet changes identified  New illness, injury, or hospitalization: No  Medication/supplement changes: None noted  Signs or symptoms of bleeding or clotting: No  Previous INR: Therapeutic last visit; previously outside of goal range  Additional findings: INR went from 2.1-2.8 so writer decides to cut dose today a small amount and watch trend. Patient stopped Lovenox after last reading.      PLAN     Recommended plan for no diet, medication or health factor changes affecting INR     Dosing Instructions: Partial hold then continue your current warfarin dose with next INR in 4 days       Summary  As of 2/17/2022    Full warfarin instructions:  2/17: 4 mg; Otherwise 5 mg every day   Next INR check:  2/21/2022             Telephone call with home care nurse Terri who verbalizes understanding and agrees to plan and who agrees to plan and repeated back plan correctly    Orders given to  Homecare nurse/facility to recheck    Education provided: Goal range and significance of current result, Importance of therapeutic range, Importance of following up at instructed interval and Importance of taking warfarin as instructed    Plan made per [facility] anticoagulation protocol    Terrie Cedillo RN  Anticoagulation Clinic  2/17/2022    _______________________________________________________________________     Anticoagulation Episode Summary     Current INR goal:  2.0-3.0   TTR:  30.0 % (1.3 mo)   Target end date:  Indefinite   Send INR reminders to:  [facility]    Indications    Atrial fibrillation  unspecified type (H) [I48.91]           [facility]  [phone]  Transplant Labs twice a week  Staying Local: [facility] then back to [place]         Anticoagulation Care Providers     Provider Role Specialty Phone number    Abiodun Woods MD Referring Pulmonary Disease [phone]

## 2022-02-17 NOTE — PROGRESS NOTES
This is a recent snapshot of the patient's [facility] medical record.  For current drug dose and complete information and questions, call [phone] or In Basket pool, fv home infusion ([id number])  CSN Number:  [id number]

## 2022-02-18 LAB — CMV DNA SPEC NAA+PROBE-ACNC: NOT DETECTED IU/ML

## 2022-02-18 NOTE — RESULT ENCOUNTER NOTE
Tacrolimus level 9.2 at 13 hours, on 2/17/2022.  Goal 8-12.   Current dose 5 mg in AM, 4.5 mg in PM    Level at goal, no change in dose.   [employer] [place] message sent

## 2022-02-21 ENCOUNTER — ANTICOAGULATION THERAPY VISIT (OUTPATIENT)
Dept: ANTICOAGULATION | Facility: CLINIC | Age: 57
End: 2022-02-21
Payer: COMMERCIAL

## 2022-02-21 DIAGNOSIS — I48.91 ATRIAL FIBRILLATION, UNSPECIFIED TYPE (H): Primary | ICD-10-CM

## 2022-02-21 LAB — INR (EXTERNAL): 1.8 (ref 0.9–1.1)

## 2022-02-21 NOTE — PROGRESS NOTES
ANTICOAGULATION MANAGEMENT     Edson Thornton 56 year old male is on warfarin with subtherapeutic INR result. (Goal INR 2.0-3.0)    Recent labs: (last 7 days)     02/21/22  0000   INR 1.8*       ASSESSMENT     Source(s): Home Care/Facility Nurse     Warfarin doses taken: Warfarin taken as instructed  Diet: No new diet changes identified  New illness, injury, or hospitalization: No  Medication/supplement changes: None noted  Signs or symptoms of bleeding or clotting: No  Previous INR: Therapeutic last 2(+) visits  Additional findings: None     PLAN     Recommended plan for no diet, medication or health factor changes affecting INR     Dosing Instructions: Continue your current warfarin dose with next INR in 3 days       Summary  As of 2/21/2022    Full warfarin instructions:  5 mg every day   Next INR check:  2/24/2022             Telephone call with home care nurse Talisha who verbalizes understanding and agrees to plan and who agrees to plan and repeated back plan correctly    Orders given to  Homecare nurse/facility to recheck    Education provided: None required    Plan made per [facility] anticoagulation protocol    Suni Choi RN  Anticoagulation Clinic  2/21/2022    _______________________________________________________________________     Anticoagulation Episode Summary     Current INR goal:  2.0-3.0   TTR:  34.6 % (1.5 mo)   Target end date:  Indefinite   Send INR reminders to:  [facility]    Indications    Atrial fibrillation  unspecified type (H) [I48.91]           Comments:  [facility] P: [phone]  Transplant Labs twice a week  Staying Local: [facility] then back to [place]         Anticoagulation Care Providers     Provider Role Specialty Phone number    Abiodun Woods MD Referring Pulmonary Disease [phone]

## 2022-02-23 LAB
BACTERIA BRONCH: NO GROWTH
BACTERIA BRONCH: NO GROWTH

## 2022-02-24 ENCOUNTER — ANTICOAGULATION THERAPY VISIT (OUTPATIENT)
Dept: ANTICOAGULATION | Facility: CLINIC | Age: 57
End: 2022-02-24

## 2022-02-24 ENCOUNTER — LAB REQUISITION (OUTPATIENT)
Dept: LAB | Facility: CLINIC | Age: 57
End: 2022-02-24
Payer: COMMERCIAL

## 2022-02-24 DIAGNOSIS — Z94.2 LUNG TRANSPLANT STATUS (H): ICD-10-CM

## 2022-02-24 DIAGNOSIS — I48.91 ATRIAL FIBRILLATION, UNSPECIFIED TYPE (H): Primary | ICD-10-CM

## 2022-02-24 LAB
ANION GAP SERPL CALCULATED.3IONS-SCNC: 4 MMOL/L (ref 3–14)
BASOPHILS # BLD AUTO: 0 10E3/UL (ref 0–0.2)
BASOPHILS NFR BLD AUTO: 0 %
BUN SERPL-MCNC: 21 MG/DL (ref 7–30)
CALCIUM SERPL-MCNC: 9.2 MG/DL (ref 8.5–10.1)
CHLORIDE BLD-SCNC: 108 MMOL/L (ref 94–109)
CO2 SERPL-SCNC: 29 MMOL/L (ref 20–32)
CREAT SERPL-MCNC: 0.87 MG/DL (ref 0.66–1.25)
EOSINOPHIL # BLD AUTO: 0.1 10E3/UL (ref 0–0.7)
EOSINOPHIL NFR BLD AUTO: 1 %
ERYTHROCYTE [DISTWIDTH] IN BLOOD BY AUTOMATED COUNT: 16 % (ref 10–15)
GFR SERPL CREATININE-BSD FRML MDRD: >90 ML/MIN/1.73M2
GLUCOSE BLD-MCNC: 98 MG/DL (ref 70–99)
HCT VFR BLD AUTO: 37 % (ref 40–53)
HGB BLD-MCNC: 11.8 G/DL (ref 13.3–17.7)
HOLD SPECIMEN: NORMAL
HOLD SPECIMEN: NORMAL
IMM GRANULOCYTES # BLD: 0.2 10E3/UL
IMM GRANULOCYTES NFR BLD: 4 %
INR (EXTERNAL): 2 (ref 0.9–1.1)
LYMPHOCYTES # BLD AUTO: 1.4 10E3/UL (ref 0.8–5.3)
LYMPHOCYTES NFR BLD AUTO: 24 %
MAGNESIUM SERPL-MCNC: 1.5 MG/DL (ref 1.6–2.3)
MCH RBC QN AUTO: 27.4 PG (ref 26.5–33)
MCHC RBC AUTO-ENTMCNC: 31.9 G/DL (ref 31.5–36.5)
MCV RBC AUTO: 86 FL (ref 78–100)
MONOCYTES # BLD AUTO: 0.7 10E3/UL (ref 0–1.3)
MONOCYTES NFR BLD AUTO: 13 %
NEUTROPHILS # BLD AUTO: 3.4 10E3/UL (ref 1.6–8.3)
NEUTROPHILS NFR BLD AUTO: 58 %
NRBC # BLD AUTO: 0 10E3/UL
NRBC BLD AUTO-RTO: 0 /100
PHOSPHATE SERPL-MCNC: 2.6 MG/DL (ref 2.5–4.5)
PLATELET # BLD AUTO: 175 10E3/UL (ref 150–450)
POTASSIUM BLD-SCNC: 3.9 MMOL/L (ref 3.4–5.3)
RBC # BLD AUTO: 4.31 10E6/UL (ref 4.4–5.9)
SODIUM SERPL-SCNC: 141 MMOL/L (ref 133–144)
TACROLIMUS BLD-MCNC: 11.3 UG/L (ref 5–15)
TME LAST DOSE: NORMAL H
TME LAST DOSE: NORMAL H
WBC # BLD AUTO: 5.8 10E3/UL (ref 4–11)

## 2022-02-24 PROCEDURE — 84100 ASSAY OF PHOSPHORUS: CPT | Mod: ORL | Performed by: INTERNAL MEDICINE

## 2022-02-24 PROCEDURE — 83735 ASSAY OF MAGNESIUM: CPT | Mod: ORL | Performed by: INTERNAL MEDICINE

## 2022-02-24 PROCEDURE — 80048 BASIC METABOLIC PNL TOTAL CA: CPT | Mod: ORL | Performed by: INTERNAL MEDICINE

## 2022-02-24 PROCEDURE — 85025 COMPLETE CBC W/AUTO DIFF WBC: CPT | Mod: ORL | Performed by: INTERNAL MEDICINE

## 2022-02-24 PROCEDURE — 80197 ASSAY OF TACROLIMUS: CPT | Mod: ORL | Performed by: INTERNAL MEDICINE

## 2022-02-24 NOTE — RESULT ENCOUNTER NOTE
Tacrolimus level 11.3 at 12 hours, on 2/24/2022.  Goal 8-12.   Current dose 5 mg in AM, 4.5 mg in PM    Level at goal , no change in dose.   [employer] message sent

## 2022-02-24 NOTE — PROGRESS NOTES
ANTICOAGULATION MANAGEMENT     Edson Thornton 56 year old male is on warfarin with therapeutic INR result. (Goal INR 2.0-3.0)    Recent labs: (last 7 days)     02/24/22  0000   INR 2.0*       ASSESSMENT     Source(s): Chart Review and Home Care/Facility Nurse     Warfarin doses taken: Warfarin taken as instructed  Diet: No new diet changes identified  New illness, injury, or hospitalization: No  Medication/supplement changes: None noted  Signs or symptoms of bleeding or clotting: No  Previous INR: Subtherapeutic  Additional findings: Please call LORE Murray with results from 2/28/22 [phone]     PLAN     Recommended plan for no diet, medication or health factor changes affecting INR     Dosing Instructions:  Increase your warfarin dose (6% change) with next INR in 4 days       Summary  As of 2/24/2022    Full warfarin instructions:  7 mg every Thu; 5 mg all other days   Next INR check:               Telephone call with home care nurse Terri who verbalizes understanding and agrees to plan and who agrees to plan and repeated back plan correctly    Check at provider office visit    Education provided: Goal range and significance of current result, Importance of therapeutic range, Importance of following up at instructed interval and Importance of taking warfarin as instructed    Plan made per [facility] anticoagulation protocol    Terrie Cedillo RN  Anticoagulation Clinic  2/24/2022    _______________________________________________________________________     Anticoagulation Episode Summary     Current INR goal:  2.0-3.0   TTR:  32.4 % (1.6 mo)   Target end date:  Indefinite   Send INR reminders to:  [facility]    Indications    Atrial fibrillation  unspecified type (H) [I48.91]           [facility]: [phone]  Transplant Labs twice a week  Staying Local: [facility] then back to [place]         Anticoagulation Care Providers     Provider Role Specialty Phone number    Chuck  Abiodun Herndon MD Referring Pulmonary Disease [phone]

## 2022-02-25 LAB — CMV DNA SPEC NAA+PROBE-ACNC: NOT DETECTED IU/ML

## 2022-02-28 ENCOUNTER — LAB (OUTPATIENT)
Dept: LAB | Facility: CLINIC | Age: 57
End: 2022-02-28
Attending: INTERNAL MEDICINE
Payer: COMMERCIAL

## 2022-02-28 ENCOUNTER — OFFICE VISIT (OUTPATIENT)
Dept: TRANSPLANT | Facility: CLINIC | Age: 57
End: 2022-02-28
Attending: INTERNAL MEDICINE
Payer: COMMERCIAL

## 2022-02-28 ENCOUNTER — TELEPHONE (OUTPATIENT)
Dept: TRANSPLANT | Facility: CLINIC | Age: 57
End: 2022-02-28

## 2022-02-28 ENCOUNTER — ANCILLARY PROCEDURE (OUTPATIENT)
Dept: GENERAL RADIOLOGY | Facility: CLINIC | Age: 57
End: 2022-02-28
Attending: INTERNAL MEDICINE
Payer: COMMERCIAL

## 2022-02-28 ENCOUNTER — ANCILLARY PROCEDURE (OUTPATIENT)
Dept: CT IMAGING | Facility: CLINIC | Age: 57
End: 2022-02-28
Attending: INTERNAL MEDICINE
Payer: COMMERCIAL

## 2022-02-28 VITALS
BODY MASS INDEX: 26.72 KG/M2 | DIASTOLIC BLOOD PRESSURE: 102 MMHG | HEIGHT: 64 IN | WEIGHT: 156.5 LBS | HEART RATE: 95 BPM | OXYGEN SATURATION: 95 % | SYSTOLIC BLOOD PRESSURE: 168 MMHG

## 2022-02-28 DIAGNOSIS — Z94.2 S/P LUNG TRANSPLANT (H): ICD-10-CM

## 2022-02-28 DIAGNOSIS — Z79.899 ENCOUNTER FOR LONG-TERM (CURRENT) USE OF HIGH-RISK MEDICATION: ICD-10-CM

## 2022-02-28 DIAGNOSIS — T86.819 COMPLICATION OF TRANSPLANTED LUNG, UNSPECIFIED COMPLICATION (H): Primary | ICD-10-CM

## 2022-02-28 DIAGNOSIS — R19.7 DIARRHEA, UNSPECIFIED TYPE: ICD-10-CM

## 2022-02-28 DIAGNOSIS — Z94.2 S/P LUNG TRANSPLANT (H): Chronic | ICD-10-CM

## 2022-02-28 DIAGNOSIS — Z94.2 LUNG REPLACED BY TRANSPLANT (H): ICD-10-CM

## 2022-02-28 DIAGNOSIS — D84.9 IMMUNOSUPPRESSED STATUS (H): ICD-10-CM

## 2022-02-28 DIAGNOSIS — Z01.10 ENCOUNTER FOR HEARING TEST: Primary | ICD-10-CM

## 2022-02-28 DIAGNOSIS — I10 PRIMARY HYPERTENSION: ICD-10-CM

## 2022-02-28 DIAGNOSIS — R09.89 PULMONARY AIR TRAPPING: Primary | ICD-10-CM

## 2022-02-28 DIAGNOSIS — Z94.2 S/P LUNG TRANSPLANT (H): Primary | ICD-10-CM

## 2022-02-28 LAB
ANION GAP SERPL CALCULATED.3IONS-SCNC: 4 MMOL/L (ref 3–14)
BUN SERPL-MCNC: 16 MG/DL (ref 7–30)
C COLI+JEJUNI+LARI FUSA STL QL NAA+PROBE: NOT DETECTED
C DIFF TOX B STL QL: NEGATIVE
CALCIUM SERPL-MCNC: 9.5 MG/DL (ref 8.5–10.1)
CHLORIDE BLD-SCNC: 109 MMOL/L (ref 94–109)
CMV DNA SPEC NAA+PROBE-ACNC: NOT DETECTED IU/ML
CO2 SERPL-SCNC: 25 MMOL/L (ref 20–32)
CREAT SERPL-MCNC: 0.93 MG/DL (ref 0.66–1.25)
EC STX1 GENE STL QL NAA+PROBE: NOT DETECTED
EC STX2 GENE STL QL NAA+PROBE: NOT DETECTED
ERYTHROCYTE [DISTWIDTH] IN BLOOD BY AUTOMATED COUNT: 16.8 % (ref 10–15)
EXPTIME-PRE: 7.18 SEC
FEF2575-%PRED-PRE: 20 %
FEF2575-PRE: 0.59 L/SEC
FEF2575-PRED: 2.83 L/SEC
FEFMAX-%PRED-PRE: 50 %
FEFMAX-PRE: 4.24 L/SEC
FEFMAX-PRED: 8.37 L/SEC
FEV1-%PRED-PRE: 37 %
FEV1-PRE: 1.19 L
FEV1FEV6-PRE: 61 %
FEV1FEV6-PRED: 80 %
FEV1FVC-PRE: 61 %
FEV1FVC-PRED: 79 %
FIFMAX-PRE: 4.51 L/SEC
FVC-%PRED-PRE: 49 %
FVC-PRE: 1.95 L
FVC-PRED: 3.98 L
GFR SERPL CREATININE-BSD FRML MDRD: >90 ML/MIN/1.73M2
GLUCOSE BLD-MCNC: 102 MG/DL (ref 70–99)
HCT VFR BLD AUTO: 38.4 % (ref 40–53)
HGB BLD-MCNC: 12.3 G/DL (ref 13.3–17.7)
MAGNESIUM SERPL-MCNC: 1.6 MG/DL (ref 1.6–2.3)
MCH RBC QN AUTO: 26.8 PG (ref 26.5–33)
MCHC RBC AUTO-ENTMCNC: 32 G/DL (ref 31.5–36.5)
MCV RBC AUTO: 84 FL (ref 78–100)
NOROV GI+II ORF1-ORF2 JNC STL QL NAA+PR: NOT DETECTED
PLATELET # BLD AUTO: 180 10E3/UL (ref 150–450)
POTASSIUM BLD-SCNC: 3.9 MMOL/L (ref 3.4–5.3)
RBC # BLD AUTO: 4.59 10E6/UL (ref 4.4–5.9)
RVA NSP5 STL QL NAA+PROBE: NOT DETECTED
SALMONELLA SP RPOD STL QL NAA+PROBE: NOT DETECTED
SHIGELLA SP+EIEC IPAH STL QL NAA+PROBE: NOT DETECTED
SODIUM SERPL-SCNC: 138 MMOL/L (ref 133–144)
TACROLIMUS BLD-MCNC: 12.3 UG/L (ref 5–15)
TME LAST DOSE: NORMAL H
TME LAST DOSE: NORMAL H
V CHOL+PARA RFBL+TRKH+TNAA STL QL NAA+PR: NOT DETECTED
WBC # BLD AUTO: 5.3 10E3/UL (ref 4–11)
Y ENTERO RECN STL QL NAA+PROBE: NOT DETECTED

## 2022-02-28 PROCEDURE — 80197 ASSAY OF TACROLIMUS: CPT | Mod: 90 | Performed by: PATHOLOGY

## 2022-02-28 PROCEDURE — 71250 CT THORAX DX C-: CPT | Mod: GC | Performed by: RADIOLOGY

## 2022-02-28 PROCEDURE — 71046 X-RAY EXAM CHEST 2 VIEWS: CPT | Mod: GC | Performed by: RADIOLOGY

## 2022-02-28 PROCEDURE — 36415 COLL VENOUS BLD VENIPUNCTURE: CPT | Performed by: PATHOLOGY

## 2022-02-28 PROCEDURE — 87506 IADNA-DNA/RNA PROBE TQ 6-11: CPT | Mod: 90 | Performed by: PATHOLOGY

## 2022-02-28 PROCEDURE — 83735 ASSAY OF MAGNESIUM: CPT | Performed by: PATHOLOGY

## 2022-02-28 PROCEDURE — 85027 COMPLETE CBC AUTOMATED: CPT | Performed by: PATHOLOGY

## 2022-02-28 PROCEDURE — 99207 PR RESPIRATORY FLOW VOLUME LOOP: CPT

## 2022-02-28 PROCEDURE — 99000 SPECIMEN HANDLING OFFICE-LAB: CPT | Performed by: PATHOLOGY

## 2022-02-28 PROCEDURE — 80048 BASIC METABOLIC PNL TOTAL CA: CPT | Performed by: PATHOLOGY

## 2022-02-28 PROCEDURE — 87209 SMEAR COMPLEX STAIN: CPT | Mod: 90 | Performed by: PATHOLOGY

## 2022-02-28 PROCEDURE — 87177 OVA AND PARASITES SMEARS: CPT | Mod: 90 | Performed by: PATHOLOGY

## 2022-02-28 RX ORDER — EPINEPHRINE 1 MG/ML
0.3 INJECTION, SOLUTION, CONCENTRATE INTRAVENOUS EVERY 5 MIN PRN
Status: CANCELLED | OUTPATIENT
Start: 2022-03-01

## 2022-02-28 RX ORDER — NALOXONE HYDROCHLORIDE 0.4 MG/ML
0.2 INJECTION, SOLUTION INTRAMUSCULAR; INTRAVENOUS; SUBCUTANEOUS
Status: CANCELLED | OUTPATIENT
Start: 2022-03-01

## 2022-02-28 RX ORDER — ALBUTEROL SULFATE 90 UG/1
1-2 AEROSOL, METERED RESPIRATORY (INHALATION)
Status: CANCELLED
Start: 2022-03-01

## 2022-02-28 RX ORDER — MAGNESIUM OXIDE 400 MG/1
1200 TABLET ORAL 2 TIMES DAILY
Qty: 270 TABLET | Refills: 11 | COMMUNITY
Start: 2022-02-28 | End: 2022-03-23

## 2022-02-28 RX ORDER — DIPHENHYDRAMINE HYDROCHLORIDE 50 MG/ML
50 INJECTION INTRAMUSCULAR; INTRAVENOUS
Status: CANCELLED
Start: 2022-03-01

## 2022-02-28 RX ORDER — PREDNISONE 5 MG/1
TABLET ORAL
Qty: 150 TABLET | Refills: 3 | COMMUNITY
Start: 2022-02-28 | End: 2022-03-24

## 2022-02-28 RX ORDER — ALBUTEROL SULFATE 0.83 MG/ML
2.5 SOLUTION RESPIRATORY (INHALATION)
Status: CANCELLED | OUTPATIENT
Start: 2022-03-01

## 2022-02-28 RX ORDER — MEPERIDINE HYDROCHLORIDE 25 MG/ML
25 INJECTION INTRAMUSCULAR; INTRAVENOUS; SUBCUTANEOUS EVERY 30 MIN PRN
Status: CANCELLED | OUTPATIENT
Start: 2022-03-01

## 2022-02-28 RX ORDER — METHYLPREDNISOLONE SODIUM SUCCINATE 125 MG/2ML
125 INJECTION, POWDER, LYOPHILIZED, FOR SOLUTION INTRAMUSCULAR; INTRAVENOUS
Status: CANCELLED
Start: 2022-03-01

## 2022-02-28 ASSESSMENT — PAIN SCALES - GENERAL: PAINLEVEL: NO PAIN (0)

## 2022-02-28 NOTE — PATIENT INSTRUCTIONS
Patient Instructions  1. Continue to hydrate with 60-70 oz fluids daily.  2. Continue to exercise daily or most days of the week.  3. Follow up with your primary care provider for annual gender health maintenance procedures.  4. Follow up with colonoscopy schedule.  5. Follow up with annual dermatology visits.  6. It doesn't seem like the COVID vaccine is working well in lung transplant patients. A number of lung transplant patients have gotten sick with COVID even after receiving the vaccines.  Based on our recent experience, it can be life-threatening to get COVID  even after being vaccinated. Please continue to act like you did not get the COVID vaccine - social distancing, wearing a mask, good hand hygiene, etc. If the people around you are vaccinated, it will help reduce the risk of you getting COVID. All members of your household should be vaccinated.  7. Increase amlodipine to 10mg at bedtime.  8. Chest CT today.   9.  Provide me with a stool sample.       Next transplant clinic appointment: 2 weeks with CXR, labs and PFTs  Next lab draw: pending tacrolimus level. Otherwise, in 2 weeks        ~~~~~~~~~~~~~~~~~~~~~~~~~    Thoracic Transplant Office phone [phone], fax [phone]    Office Hours 8:30 - 5:00     For after-hours urgent issues, please dial [phone], and ask to speak with the Thoracic Transplant Coordinator On-Call.  --------------------  To expedite your medication refill(s), please contact your pharmacy and have them fax a refill request to: [phone]  .   *Please allow 3 business days for routine medication refills.  *Please allow 5 business days for controlled substance medication refills.    **For Diabetic medications and supplies refill(s), please contact your pharmacy and have them contact your Endocrine team.  --------------------  For scheduling appointments call [phone].  --------------------  Please Note: If you are active on MyChart, all future test results will be  sent by [employer] message only, and will no longer be called to patient. You may also receive communication directly from your physician.

## 2022-02-28 NOTE — NURSING NOTE
"Chief Complaint   Patient presents with     RECHECK     lung TX     BP (!) 168/102   Pulse 95   Ht 1.626 m (5' 4\")   Wt 71 kg (156 lb 8 oz)   SpO2 95%   BMI 26.86 kg/m        Chito Villalobos MA  "

## 2022-02-28 NOTE — TELEPHONE ENCOUNTER
Tacrolimus level 12.3 at 12 hours, on 2/28/2022.  Goal 8-12.   Current dose 5 mg in AM, 4.5 mg in PM    Level at goal, no change in dose    Patient shows RLL air trapping on 2/28 CT.   Per Dr. Woods, patient to get solumedrol 1g daily x 3.     Patient verbalized understanding and agreement of plan. Will call back with questions, concerns, updates.

## 2022-02-28 NOTE — PROGRESS NOTES
This is a recent snapshot of the patient's [facility] medical record.  For current drug dose and complete information and questions, call [phone] or In Basket pool, fv home infusion ([id number])  CSN Number:  [id number]

## 2022-02-28 NOTE — PROGRESS NOTES
Reason for Visit  Edson Thornton is a 56 year old year old male who is being seen for RECHECK (lung TX)      1.  Assess Zio patch outcome and assess need for antiarrhythmic therapy/anticoagulation long-term  2.  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  (mid march)    Assessment and plan: ***    Lung TX HPI  Transplants:  10/16/2021 (Lung), Postoperative day:  135    The patient was seen and examined by Abiodun Woods MD     A complete ROS was otherwise negative except as noted in the HPI.    Current Outpatient Medications   Medication     acetaminophen (TYLENOL) 325 MG tablet     amLODIPine (NORVASC) 5 MG tablet     calcium carbonate 600 mg-vitamin D 400 units (CALTRATE) 600-400 MG-UNIT per tablet     escitalopram (LEXAPRO) 20 MG tablet     fluticasone (FLONASE) 50 MCG/ACT nasal spray     folic acid-vit B6-vit B12 (FOLGARD) 0.8-10-0.115 MG TABS per tablet     insulin glargine (LANTUS PEN) 100 UNIT/ML pen     levalbuterol (XOPENEX HFA) 45 MCG/ACT inhaler     levothyroxine (SYNTHROID/LEVOTHROID) 25 MCG tablet     magnesium oxide (MAG-OX) 400 MG tablet     Melatonin 10 MG TABS tablet     multivitamin w/minerals (THERA-VIT-M) tablet     mycophenolate (GENERIC EQUIVALENT) 250 MG capsule     nystatin (MYCOSTATIN) 773804 UNIT/ML suspension     ondansetron (ZOFRAN-ODT) 4 MG ODT tab     pantoprazole (PROTONIX) 40 MG EC tablet     predniSONE (DELTASONE) 5 MG tablet     rosuvastatin (CRESTOR) 10 MG tablet     sulfamethoxazole-trimethoprim (BACTRIM) 400-80 MG tablet     tacrolimus (GENERIC EQUIVALENT) 0.5 MG capsule     tacrolimus (GENERIC EQUIVALENT) 1 MG capsule     warfarin ANTICOAGULANT (COUMADIN) 1 MG tablet     warfarin ANTICOAGULANT (COUMADIN) 2 MG tablet     No current facility-administered medications for this visit.     No Known Allergies  Past Medical History:   Diagnosis Date     BRENNAN (acute kidney injury) (H)  10/17/2021     Anxiety      Depression      HLD (hyperlipidemia)      HTN (hypertension)      Hypothyroidism      ILD (interstitial lung disease) (H)      SALTY on CPAP      Oxygen dependent     BL 4L since ~6/2021     Primary hypertension 2/28/2022     Rheumatoid arthritis (H)     signs ~5/2020, dx 5/2021     S/P lung transplant (H) 10/16/2021     Shock liver 10/17/2021     Steroid-induced hyperglycemia      Traction bronchiectasis (H)        Past Surgical History:   Procedure Laterality Date     BRONCHOSCOPY (RIGID OR FLEXIBLE), DIAGNOSTIC N/A 1/26/2022    Procedure: BRONCHOSCOPY, WITH BRONCHOALVEOLAR LAVAGE AND BIOPSY;  Surgeon: Perlman, David Morris, MD;  Location:  GI     COLONOSCOPY W/ BIOPSIES AND POLYPECTOMY  07/21/2020     CV CORONARY ANGIOGRAM N/A 09/08/2021    Procedure: Coronary Angiogram with possible intervention;  Surgeon: Jovon Bullock MD;  Location:  HEART CARDIAC CATH LAB     CV RIGHT HEART CATH MEASUREMENTS RECORDED N/A 09/08/2021    Procedure: Right Heart Cath;  Surgeon: Jovon Bullock MD;  Location:  HEART CARDIAC CATH LAB     ESOPHAGOSCOPY, GASTROSCOPY, DUODENOSCOPY (EGD), COMBINED N/A 10/23/2021    Procedure: ESOPHAGOGASTRODUODENOSCOPY (EGD);  Surgeon: Miquel Pisano MD;  Location:  GI     ESOPHAGOSCOPY, GASTROSCOPY, DUODENOSCOPY (EGD), COMBINED N/A 11/02/2021    Procedure: ESOPHAGOGASTRODUODENOSCOPY (EGD);  Surgeon: Daniel Ortiz MD;  Location:  GI     ESOPHAGOSCOPY, GASTROSCOPY, DUODENOSCOPY (EGD), COMBINED N/A 11/5/2021    Procedure: ESOPHAGOGASTRODUODENOSCOPY (EGD);  Surgeon: Ronnell Hernandez MD;  Location:  GI     EXTRACTION(S) DENTAL N/A 09/22/2021    Procedure: EXTRACTION tooth #19;  Surgeon: Deepak Tobin DDS;  Location: [facility] OR     HERNIA REPAIR       IR CHEST TUBE PLACEMENT NON-TUNNELLED LEFT  11/03/2021     PICC TRIPLE LUMEN PLACEMENT Left 11/04/2021    5FR TL PICC. Right non occlusive thrombus subclavian vein.     REPLACE  "GASTROJEJUNOSTOMY TUBE, PERCUTANEOUS N/A 11/9/2021    Procedure: REPLACEMENT, GASTROJEJUNOSTOMY TUBE, PERCUTANEOUS;  Surgeon: Hernando Rodriguez MD;  Location: [facility] GI     right acl       TRACHEOSTOMY PERCUTANEOUS N/A 10/29/2021    Procedure: Percutaneous Tracheostomy,;  Surgeon: Celine Jenkins MD;  Location: [facility] OR     TRANSPLANT LUNG RECIPIENT SINGLE X2 Bilateral 10/16/2021    Procedure: TRANSPLANT, LUNG, RECIPIENT, BILATERAL, Bronchoscopy, on-pump perfusion, bilateral clamshell sternotomy;  Surgeon: Yanick Corral MD;  Location: [facility] OR     XR ACROMIOCLAVICULAR JOINT BILATERAL         Social History     Socioeconomic History     Marital status: Single     Spouse name: Not on file     Number of children: Not on file     Years of education: Not on file     Highest education level: Not on file   Occupational History     Not on file   Tobacco Use     Smoking status: Never Smoker     Smokeless tobacco: Never Used   Substance and Sexual Activity     Alcohol use: Never     Drug use: Never     Sexual activity: Not on file   Other Topics Concern     Parent/sibling w/ CABG, MI or angioplasty before 65F 55M? Not Asked   Social History Narrative     Not on file     Social Determinants of Health     Financial Resource Strain: Not on file   Food Insecurity: Not on file   Transportation Needs: Not on file   Physical Activity: Not on file   Stress: Not on file   Social Connections: Not on file   Intimate Partner Violence: Not on file   Housing Stability: Not on file         BP (!) 168/102   Pulse 95   Ht 1.626 m (5' 4\")   Wt 71 kg (156 lb 8 oz)   SpO2 95%   BMI 26.86 kg/m    Body mass index is 26.86 kg/m .  Exam:   GENERAL APPEARANCE: Well developed, well nourished, alert, and in no apparent distress.  EYES: PERRL, EOMI  HENT: Nasal mucosa with no edema and no hyperemia. No nasal polyps.  EARS: Canals clear, TMs normal. Possible small amt of old blood in the right canal  MOUTH: Oral mucosa is moist, without any " lesions, no tonsillar enlargement, no oropharyngeal exudate.  NECK: supple, no masses, no thyromegaly.  LYMPHATICS: No significant axillary, cervical, or supraclavicular nodes.  RESP: normal percussion, good air flow throughout the left, mildly decreased on the right.  No crackles. No rhonchi. Faint left basilar wheezes.  CV: Normal S1, S2, regular rhythm, normal rate. No murmur.  No rub. No gallop. No LE edema.   ABDOMEN:  Bowel sounds normal, soft, nontender, no HSM or masses.   MS: extremities normal. (+) clubbing. No cyanosis.  SKIN: no rash on limited exam  NEURO: Mentation intact, speech normal, normal strength and tone, normal gait and stance  PSYCH: mentation appears normal. and affect normal/bright  Results:  Recent Results (from the past 168 hour(s))   INR (External Result)    Collection Time: 02/24/22 12:00 AM   Result Value Ref Range    INR (External) 2.0 (A) 0.9 - 1.1   Basic metabolic panel    Collection Time: 02/24/22 10:00 AM   Result Value Ref Range    Sodium 141 133 - 144 mmol/L    Potassium 3.9 3.4 - 5.3 mmol/L    Chloride 108 94 - 109 mmol/L    Carbon Dioxide (CO2) 29 20 - 32 mmol/L    Anion Gap 4 3 - 14 mmol/L    Urea Nitrogen 21 7 - 30 mg/dL    Creatinine 0.87 0.66 - 1.25 mg/dL    Calcium 9.2 8.5 - 10.1 mg/dL    Glucose 98 70 - 99 mg/dL    GFR Estimate >90 >60 mL/min/1.73m2   Magnesium    Collection Time: 02/24/22 10:00 AM   Result Value Ref Range    Magnesium 1.5 (L) 1.6 - 2.3 mg/dL   Phosphorus    Collection Time: 02/24/22 10:00 AM   Result Value Ref Range    Phosphorus 2.6 2.5 - 4.5 mg/dL   Tacrolimus by Tandem Mass Spectrometry    Collection Time: 02/24/22 10:00 AM   Result Value Ref Range    Tacrolimus by Tandem Mass Spectrometry 11.3 5.0 - 15.0 ug/L    Tacrolimus Last Dose Date      Tacrolimus Last Dose Time     CMV Quantitative, PCR    Collection Time: 02/24/22 10:00 AM    Specimen: Arm, Left; Blood   Result Value Ref Range    CMV DNA IU/mL Not Detected Not Detected IU/mL   CBC with  platelets and differential    Collection Time: 02/24/22 10:00 AM   Result Value Ref Range    WBC Count 5.8 4.0 - 11.0 10e3/uL    RBC Count 4.31 (L) 4.40 - 5.90 10e6/uL    Hemoglobin 11.8 (L) 13.3 - 17.7 g/dL    Hematocrit 37.0 (L) 40.0 - 53.0 %    MCV 86 78 - 100 fL    MCH 27.4 26.5 - 33.0 pg    MCHC 31.9 31.5 - 36.5 g/dL    RDW 16.0 (H) 10.0 - 15.0 %    Platelet Count 175 150 - 450 10e3/uL    % Neutrophils 58 %    % Lymphocytes 24 %    % Monocytes 13 %    % Eosinophils 1 %    % Basophils 0 %    % Immature Granulocytes 4 %    NRBCs per 100 WBC 0 <1 /100    Absolute Neutrophils 3.4 1.6 - 8.3 10e3/uL    Absolute Lymphocytes 1.4 0.8 - 5.3 10e3/uL    Absolute Monocytes 0.7 0.0 - 1.3 10e3/uL    Absolute Eosinophils 0.1 0.0 - 0.7 10e3/uL    Absolute Basophils 0.0 0.0 - 0.2 10e3/uL    Absolute Immature Granulocytes 0.2 <=0.4 10e3/uL    Absolute NRBCs 0.0 10e3/uL   Extra Red Top Tube    Collection Time: 02/24/22 10:00 AM   Result Value Ref Range    Hold Specimen [facility]    Extra Green Top (Lithium Heparin) Tube    Collection Time: 02/24/22 10:00 AM   Result Value Ref Range    Hold Specimen [facility]    Basic metabolic panel    Collection Time: 02/28/22  8:41 AM   Result Value Ref Range    Sodium 138 133 - 144 mmol/L    Potassium 3.9 3.4 - 5.3 mmol/L    Chloride 109 94 - 109 mmol/L    Carbon Dioxide (CO2) 25 20 - 32 mmol/L    Anion Gap 4 3 - 14 mmol/L    Urea Nitrogen 16 7 - 30 mg/dL    Creatinine 0.93 0.66 - 1.25 mg/dL    Calcium 9.5 8.5 - 10.1 mg/dL    Glucose 102 (H) 70 - 99 mg/dL    GFR Estimate >90 >60 mL/min/1.73m2   Magnesium    Collection Time: 02/28/22  8:41 AM   Result Value Ref Range    Magnesium 1.6 1.6 - 2.3 mg/dL   CBC with platelets    Collection Time: 02/28/22  8:41 AM   Result Value Ref Range    WBC Count 5.3 4.0 - 11.0 10e3/uL    RBC Count 4.59 4.40 - 5.90 10e6/uL    Hemoglobin 12.3 (L) 13.3 - 17.7 g/dL    Hematocrit 38.4 (L) 40.0 - 53.0 %    MCV 84 78 - 100 fL    MCH 26.8 26.5 - 33.0 pg    MCHC 32.0 31.5 - 36.5 g/dL     RDW 16.8 (H) 10.0 - 15.0 %    Platelet Count 180 150 - 450 10e3/uL   General PFT Lab (Please always keep checked)    Collection Time: 02/28/22  8:55 AM   Result Value Ref Range    FVC-Pred 3.98 L    FVC-Pre 1.95 L    FVC-%Pred-Pre 49 %    FEV1-Pre 1.19 L    FEV1-%Pred-Pre 37 %    FEV1FVC-Pred 79 %    FEV1FVC-Pre 61 %    FEFMax-Pred 8.37 L/sec    FEFMax-Pre 4.24 L/sec    FEFMax-%Pred-Pre 50 %    FEF2575-Pred 2.83 L/sec    FEF2575-Pre 0.59 L/sec    OAA0489-%Pred-Pre 20 %    ExpTime-Pre 7.18 sec    FIFMax-Pre 4.51 L/sec    FEV1FEV6-Pred 80 %    FEV1FEV6-Pre 61 %                         Results as noted above.    PFT Interpretation:  {Chuck PFT interpretation:287401}  {Increase/decrease:624141}  {JDPFT:842859}  Valid Maneuver

## 2022-02-28 NOTE — PROGRESS NOTES
"Transplant Coordinator Note    Reason for visit: Post lung transplant follow up visit   Coordinator: Present   Caregiver:  SO    Health concerns addressed today:  1. Respiratory - breathing comfortable at rest. Shortness of breath with activity, states \"feel like I can't completely expand my lungs\". Able to walk more before resting. Coughing sometimes - dry cough  2. Night sweats, sheets are damp sometimes. Started a couple weeks ago.   3. GI: appetite good. BM looser this week - sometimes watery. BM up to 5x/day. No black stool.   4. ENT: no issues, at baseline.    5. BPs at home 140-150/90s    Activity/rehab: pulmonary rehab  Oxygen needs: room air  Diabetic management: Lantus  Next Bronch due:    CMV status: D-/R-  EBV status: D+/R+  AC: continues on coumadin  PJP prophylactic: Bactrim    COVID:  1. COVID-19 infection (yes/no, date of most recent positive test):   2. Status/instructions given about COVID-19 vaccine: vaccinated, melissa Edwards 1/7/2022    Pt Education: medications (use/dose/side effects), how/when to call coordinator, frequency of labs, s/s of infection/rejection, call prior to starting any new medications, lab/vital sign book    Health Maintenance:     Last colonoscopy:     Next colonoscopy due:     Dermatology:    Vaccinations this visit:     Labs, CXR, PFTs reviewed with patient  Medication record reviewed and reconciled  Questions and concerns addressed    Patient Instructions  1. Continue to hydrate with 60-70 oz fluids daily.  2. Continue to exercise daily or most days of the week.  3. Follow up with your primary care provider for annual gender health maintenance procedures.  4. Follow up with colonoscopy schedule.  5. Follow up with annual dermatology visits.  6. It doesn't seem like the COVID vaccine is working well in lung transplant patients. A number of lung transplant patients have gotten sick with COVID even after receiving the vaccines.  Based on our recent experience, it can be " life-threatening to get COVID  even after being vaccinated. Please continue to act like you did not get the COVID vaccine - social distancing, wearing a mask, good hand hygiene, etc. If the people around you are vaccinated, it will help reduce the risk of you getting COVID. All members of your household should be vaccinated.  7. Increase amlodipine to 10mg at bedtime.  8. Chest CT today.   9.  Provide me with a stool sample.       Next transplant clinic appointment: 2 weeks with CXR, labs and PFTs  Next lab draw: pending tacrolimus level. Otherwise, in 2 weeks      AVS printed at time of check out

## 2022-03-01 ENCOUNTER — HOSPITAL ENCOUNTER (OUTPATIENT)
Dept: CARDIAC REHAB | Facility: CLINIC | Age: 57
End: 2022-03-01
Attending: INTERNAL MEDICINE
Payer: COMMERCIAL

## 2022-03-01 ENCOUNTER — OFFICE VISIT (OUTPATIENT)
Dept: OTOLARYNGOLOGY | Facility: CLINIC | Age: 57
End: 2022-03-01
Payer: COMMERCIAL

## 2022-03-01 ENCOUNTER — OFFICE VISIT (OUTPATIENT)
Dept: AUDIOLOGY | Facility: CLINIC | Age: 57
End: 2022-03-01
Attending: OTOLARYNGOLOGY
Payer: COMMERCIAL

## 2022-03-01 ENCOUNTER — PRE VISIT (OUTPATIENT)
Dept: OTOLARYNGOLOGY | Facility: CLINIC | Age: 57
End: 2022-03-01

## 2022-03-01 VITALS
HEIGHT: 64 IN | HEART RATE: 99 BPM | WEIGHT: 156 LBS | SYSTOLIC BLOOD PRESSURE: 163 MMHG | BODY MASS INDEX: 26.63 KG/M2 | TEMPERATURE: 98.1 F | DIASTOLIC BLOOD PRESSURE: 95 MMHG

## 2022-03-01 DIAGNOSIS — H90.3 SENSORY HEARING LOSS, BILATERAL: Primary | ICD-10-CM

## 2022-03-01 DIAGNOSIS — H92.21 BLOOD IN EAR CANAL, RIGHT: ICD-10-CM

## 2022-03-01 DIAGNOSIS — Z01.10 ENCOUNTER FOR HEARING TEST: ICD-10-CM

## 2022-03-01 LAB — O+P STL MICRO: NEGATIVE

## 2022-03-01 PROCEDURE — 92504 EAR MICROSCOPY EXAMINATION: CPT | Performed by: REGISTERED NURSE

## 2022-03-01 PROCEDURE — 99203 OFFICE O/P NEW LOW 30 MIN: CPT | Mod: 25 | Performed by: REGISTERED NURSE

## 2022-03-01 PROCEDURE — G0238 OTH RESP PROC, INDIV: HCPCS

## 2022-03-01 PROCEDURE — 92557 COMPREHENSIVE HEARING TEST: CPT | Performed by: AUDIOLOGIST-HEARING AID FITTER

## 2022-03-01 PROCEDURE — 92550 TYMPANOMETRY & REFLEX THRESH: CPT | Performed by: AUDIOLOGIST-HEARING AID FITTER

## 2022-03-01 ASSESSMENT — PAIN SCALES - GENERAL: PAINLEVEL: NO PAIN (0)

## 2022-03-01 NOTE — PROGRESS NOTES
AUDIOLOGY REPORT    SUMMARY: Audiology visit completed. See audiogram for results.      RECOMMENDATIONS: Follow-up with ENT.      Mikey Rashid, CCC-A, [facility], [place]  Licensed Audiologist  [place] #[id number]

## 2022-03-01 NOTE — NURSING NOTE
"Chief Complaint   Patient presents with     Consult     blood in right auditory canal    Blood pressure (!) 163/95, pulse 99, temperature 98.1  F (36.7  C), temperature source Temporal, height 1.626 m (5' 4\"), weight 70.8 kg (156 lb). Kenna Fernandez, EMT  "

## 2022-03-01 NOTE — PROGRESS NOTES
Otolaryngology Clinic  March 1, 2022    HPI:  Edson Thornton is here for an assessment of patient's right ear. Patient was referred by Dr. Woods in Pulmonary due to dried blood visualized in canal during recent clinic visit.  Patient has a history of lung transplant completed in October 2021. Post-op course also complicated by encephalopathy and diffuse weakness, acute to subacute CVA, afib with RVR, BRENNAN, GI bleed, Candidemia/Candida empyema, and anxiety.     Presents to clinic today for assessment of right ear canal.  Patient denies any trauma or manipulation of canal that could have caused bleeding.  Denies any pain or drainage from the right ear.  States that hearing feels muffled bilaterally but denies any significant hearing loss.  Does reports constant bilateral tinnitus that is nonbothersome to patient.  History of noise exposure in both the  and as a .  Denies any dizziness, history of frequent ear infections, or history of ear surgery.    Otologic microscope exam:    Patient's ear pathology required use of the binocular microscope for the purpose of cleaning and improving visualization in order to assure a more accurate diagnostic evaluation.    Right ear was examined under the microscope.  Scant amount of dried bloody crusting noted on inferior canal.  It was cleaned with alligator forceps.   The remainder of the canal was clean and healthy.  No lesions or ulcerations noted in canal. Once cleaned, TM visualized under microscope. Normal appearing TM, nicely aerated middle ear space.     Left ear was also examined under the microscope.  Ear canal is clean and healthy without drainage.  Once cleaned, TM visualized under microscope. Normal appearing TM, nicely aerated middle ear space.     Assessment and Plan:    1. Blood in ear canal, right  Patient with dried bloody crusting in canal.  This was cleaned under microscopy today.  No lesions or ulcerations visualized in canal.  Tympanic  membrane is intact without evidence of trauma.  Discussed importance of refraining from using Q-tips or other instruments in ear.  Patient denies any Q-tip use.    Recommend that patient further evaluate muffled hearing symptoms with an audiogram.  He is scheduled for this test after appointment today.  Will review audiogram after completion and contact patient with results and recommendations.    Patient will follow up as needed in clinic.    Guadalupe Bell DNP, APRN, CNP  Otolaryngology  Head & Neck Surgery  [phone]    30 minutes spent on the date of the encounter doing chart review, history and exam, documentation and further activities per the note

## 2022-03-01 NOTE — PATIENT INSTRUCTIONS
- You were seen in the ENT Clinic today by Guadalupe Bell NP.   - There is no bleeding in canal today. No lesions or masses seen on exam. Dried blood was cleaned under microscopy.   - Recommend audiogram to assess hearing.  - Please send a [facility] message or call the ENT clinic at [phone] with any questions or concerns.

## 2022-03-01 NOTE — LETTER
3/1/2022       RE: Edson Thornton  [address]  [place] [address]     Dear Colleague,    Thank you for referring your patient, Edson Thornton, to the [facility] [place] [facility]. Please see a copy of my visit note below.      Otolaryngology Clinic  March 1, 2022    HPI:  Edson Thornton is here for an assessment of patient's right ear. Patient was referred by Dr. Woods in Pulmonary due to dried blood visualized in canal during recent clinic visit.  Patient has a history of lung transplant completed in October 2021. Post-op course also complicated by encephalopathy and diffuse weakness, acute to subacute CVA, afib with RVR, BRENNAN, GI bleed, Candidemia/Candida empyema, and anxiety.     Presents to clinic today for assessment of right ear canal.  Patient denies any trauma or manipulation of canal that could have caused bleeding.  Denies any pain or drainage from the right ear.  States that hearing feels muffled bilaterally but denies any significant hearing loss.  Does reports constant bilateral tinnitus that is nonbothersome to patient.  History of noise exposure in both the  and as a .  Denies any dizziness, history of frequent ear infections, or history of ear surgery.    Otologic microscope exam:    Patient's ear pathology required use of the binocular microscope for the purpose of cleaning and improving visualization in order to assure a more accurate diagnostic evaluation.    Right ear was examined under the microscope.  Scant amount of dried bloody crusting noted on inferior canal.  It was cleaned with alligator forceps.   The remainder of the canal was clean and healthy.  No lesions or ulcerations noted in canal. Once cleaned, TM visualized under microscope. Normal appearing TM, nicely aerated middle ear space.     Left ear was also examined under the microscope.  Ear canal is clean and  RN spoke with pt who reports a \"headache constantly for a month\".  Pt rates pain an \"8\" on a scale of 1-10.   Pt reports taking Ibuprofen  prior to an appt at [facility] this am with no results. Pt reports pain is dull with pressure.  Pt reports taking Topamax 100 mg BID.  Pt requesting EEG results from 4/27/2021.  Writer called Dr Stacie rosa for results.  Awaiting results at this time    Routing to Dr Pierson to advise   healthy without drainage.  Once cleaned, TM visualized under microscope. Normal appearing TM, nicely aerated middle ear space.     Assessment and Plan:    1. Blood in ear canal, right  Patient with dried bloody crusting in canal.  This was cleaned under microscopy today.  No lesions or ulcerations visualized in canal.  Tympanic membrane is intact without evidence of trauma.  Discussed importance of refraining from using Q-tips or other instruments in ear.  Patient denies any Q-tip use.    Recommend that patient further evaluate muffled hearing symptoms with an audiogram.  He is scheduled for this test after appointment today.  Will review audiogram after completion and contact patient with results and recommendations.    Patient will follow up as needed in clinic.    Guadalupe Bell DNP, APRN, CNP  Otolaryngology  Head & Neck Surgery  [phone]    30 minutes spent on the date of the encounter doing chart review, history and exam, documentation and further activities per the note      Again, thank you for allowing me to participate in the care of your patient.      Sincerely,    Carmen Bell, NP

## 2022-03-02 ENCOUNTER — INFUSION THERAPY VISIT (OUTPATIENT)
Dept: INFUSION THERAPY | Facility: CLINIC | Age: 57
End: 2022-03-02
Attending: INTERNAL MEDICINE
Payer: COMMERCIAL

## 2022-03-02 ENCOUNTER — TELEPHONE (OUTPATIENT)
Dept: TRANSPLANT | Facility: CLINIC | Age: 57
End: 2022-03-02

## 2022-03-02 VITALS
RESPIRATION RATE: 16 BRPM | SYSTOLIC BLOOD PRESSURE: 171 MMHG | DIASTOLIC BLOOD PRESSURE: 91 MMHG | OXYGEN SATURATION: 96 % | HEART RATE: 108 BPM | TEMPERATURE: 98.5 F

## 2022-03-02 DIAGNOSIS — R09.89 PULMONARY AIR TRAPPING: Primary | ICD-10-CM

## 2022-03-02 DIAGNOSIS — Z94.2 S/P LUNG TRANSPLANT (H): ICD-10-CM

## 2022-03-02 LAB
ALBUMIN SERPL-MCNC: 3.5 G/DL (ref 3.4–5)
ALP SERPL-CCNC: 69 U/L (ref 40–150)
ALT SERPL W P-5'-P-CCNC: 32 U/L (ref 0–70)
ANION GAP SERPL CALCULATED.3IONS-SCNC: 9 MMOL/L (ref 3–14)
AST SERPL W P-5'-P-CCNC: 24 U/L (ref 0–45)
BASOPHILS # BLD AUTO: 0 10E3/UL (ref 0–0.2)
BASOPHILS NFR BLD AUTO: 0 %
BILIRUB DIRECT SERPL-MCNC: <0.1 MG/DL (ref 0–0.2)
BILIRUB SERPL-MCNC: 0.2 MG/DL (ref 0.2–1.3)
BUN SERPL-MCNC: 22 MG/DL (ref 7–30)
CALCIUM SERPL-MCNC: 9.8 MG/DL (ref 8.5–10.1)
CHLORIDE BLD-SCNC: 106 MMOL/L (ref 94–109)
CO2 SERPL-SCNC: 24 MMOL/L (ref 20–32)
CREAT SERPL-MCNC: 1 MG/DL (ref 0.66–1.25)
EOSINOPHIL # BLD AUTO: 0 10E3/UL (ref 0–0.7)
EOSINOPHIL NFR BLD AUTO: 0 %
ERYTHROCYTE [DISTWIDTH] IN BLOOD BY AUTOMATED COUNT: 17.1 % (ref 10–15)
GFR SERPL CREATININE-BSD FRML MDRD: 88 ML/MIN/1.73M2
GLUCOSE BLD-MCNC: 193 MG/DL (ref 70–99)
HCT VFR BLD AUTO: 35.5 % (ref 40–53)
HGB BLD-MCNC: 11.4 G/DL (ref 13.3–17.7)
IMM GRANULOCYTES # BLD: 0.2 10E3/UL
IMM GRANULOCYTES NFR BLD: 3 %
LYMPHOCYTES # BLD AUTO: 0.7 10E3/UL (ref 0.8–5.3)
LYMPHOCYTES NFR BLD AUTO: 14 %
MCH RBC QN AUTO: 27 PG (ref 26.5–33)
MCHC RBC AUTO-ENTMCNC: 32.1 G/DL (ref 31.5–36.5)
MCV RBC AUTO: 84 FL (ref 78–100)
MONOCYTES # BLD AUTO: 0.6 10E3/UL (ref 0–1.3)
MONOCYTES NFR BLD AUTO: 12 %
NEUTROPHILS # BLD AUTO: 3.6 10E3/UL (ref 1.6–8.3)
NEUTROPHILS NFR BLD AUTO: 71 %
NRBC # BLD AUTO: 0 10E3/UL
NRBC BLD AUTO-RTO: 0 /100
PLATELET # BLD AUTO: 176 10E3/UL (ref 150–450)
POTASSIUM BLD-SCNC: 3.8 MMOL/L (ref 3.4–5.3)
PROT SERPL-MCNC: 6.5 G/DL (ref 6.8–8.8)
RBC # BLD AUTO: 4.22 10E6/UL (ref 4.4–5.9)
SODIUM SERPL-SCNC: 139 MMOL/L (ref 133–144)
WBC # BLD AUTO: 5 10E3/UL (ref 4–11)

## 2022-03-02 PROCEDURE — 82248 BILIRUBIN DIRECT: CPT | Performed by: INTERNAL MEDICINE

## 2022-03-02 PROCEDURE — 250N000011 HC RX IP 250 OP 636: Performed by: INTERNAL MEDICINE

## 2022-03-02 PROCEDURE — 80053 COMPREHEN METABOLIC PANEL: CPT | Performed by: INTERNAL MEDICINE

## 2022-03-02 PROCEDURE — 85025 COMPLETE CBC W/AUTO DIFF WBC: CPT | Performed by: INTERNAL MEDICINE

## 2022-03-02 PROCEDURE — 96365 THER/PROPH/DIAG IV INF INIT: CPT

## 2022-03-02 PROCEDURE — 258N000003 HC RX IP 258 OP 636: Performed by: INTERNAL MEDICINE

## 2022-03-02 RX ORDER — ALBUTEROL SULFATE 90 UG/1
1-2 AEROSOL, METERED RESPIRATORY (INHALATION)
Status: CANCELLED
Start: 2022-03-03

## 2022-03-02 RX ORDER — MEPERIDINE HYDROCHLORIDE 25 MG/ML
25 INJECTION INTRAMUSCULAR; INTRAVENOUS; SUBCUTANEOUS EVERY 30 MIN PRN
Status: CANCELLED | OUTPATIENT
Start: 2022-03-03

## 2022-03-02 RX ORDER — NALOXONE HYDROCHLORIDE 0.4 MG/ML
0.2 INJECTION, SOLUTION INTRAMUSCULAR; INTRAVENOUS; SUBCUTANEOUS
Status: CANCELLED | OUTPATIENT
Start: 2022-03-03

## 2022-03-02 RX ORDER — ALBUTEROL SULFATE 0.83 MG/ML
2.5 SOLUTION RESPIRATORY (INHALATION)
Status: CANCELLED | OUTPATIENT
Start: 2022-03-03

## 2022-03-02 RX ORDER — METHYLPREDNISOLONE SODIUM SUCCINATE 125 MG/2ML
125 INJECTION, POWDER, LYOPHILIZED, FOR SOLUTION INTRAMUSCULAR; INTRAVENOUS
Status: CANCELLED
Start: 2022-03-03

## 2022-03-02 RX ORDER — DIPHENHYDRAMINE HYDROCHLORIDE 50 MG/ML
50 INJECTION INTRAMUSCULAR; INTRAVENOUS
Status: CANCELLED
Start: 2022-03-03

## 2022-03-02 RX ORDER — EPINEPHRINE 1 MG/ML
0.3 INJECTION, SOLUTION INTRAMUSCULAR; SUBCUTANEOUS EVERY 5 MIN PRN
Status: CANCELLED | OUTPATIENT
Start: 2022-03-03

## 2022-03-02 RX ADMIN — SODIUM CHLORIDE 50 ML: 9 INJECTION, SOLUTION INTRAVENOUS at 15:55

## 2022-03-02 RX ADMIN — SODIUM CHLORIDE 1000 MG: 9 INJECTION, SOLUTION INTRAVENOUS at 15:52

## 2022-03-02 NOTE — PROGRESS NOTES
Nursing Note  Edson Thornton [facility] for:   Chief Complaint   Patient presents with     Infusion     Solu-medrol (methyprednisolone)     During [facility]'s [facility] appointment, orders from Dr. Woods were completed.  Frequency: today is dose 1 of 3 total    Progress note:  Patient identification verified by name and date of birth.  Assessment completed.  Vitals recorded in Doc Flowsheets.  Patient was provided with education regarding medication/procedure and possible side effects.  Patient verbalized understanding.     present during visit today: Not Applicable.    Treatment Conditions: Non-applicable.  Premedications: were not ordered.  Drug Waste Record: No  Infusion length and rate:  400 ml/hr., over about 40 minutes  Labs: were drawn per orders.   Vascular access: peripheral IV placed today.    Is the next appt scheduled? yes  Asymptomatic COVID test completed? no    Post Infusion Assessment:  Patient tolerated infusion without incident.  Site patent and intact, free from redness, edema or discomfort.  No evidence of extravasations.  Access discontinued per protocol.     Discharge Plan:   Follow up plan of care with: ongoing infusions at [facility].  Discharge instructions were reviewed with patient.  Patient/representative verbalized understanding of discharge instructions and all questions answered.  Patient discharged from [facility] in stable condition.    Dianne Perry, TIFFANY    Administrations This Visit     0.9% sodium chloride BOLUS     Admin Date  03/02/2022 Action  New Bag Dose  50 mL Route  Intravenous Administered By  Dianne Perry, RN          methylPREDNISolone sodium succinate (solu-MEDROL) 1,000 mg in sodium chloride 0.9 % 291 mL intermittent infusion     Admin Date  03/02/2022 Action  New Bag Dose  1,000 mg Rate  582 mL/hr Route  Intravenous  Administered By  Dianne Perry RN                BP (!) 171/91   Pulse 108   Temp 98.5  F (36.9  C) (Oral)   Resp 16   SpO2 96%

## 2022-03-02 NOTE — LETTER
3/2/2022     RE: Edson Thornton  [address]  [place] [address]    Dear Colleague,    Thank you for referring your patient, Edson Thornton, to the [facility]. Please see a copy of my visit note below.    Nursing Note  Edson Thornton presents today to [facility] for:   Chief Complaint   Patient presents with     Infusion     Solu-medrol (methyprednisolone)     During today's [facility] appointment, orders from Dr. Woods were completed.  Frequency: today is dose 1 of 3 total    Progress note:  Patient identification verified by name and date of birth.  Assessment completed.  Vitals recorded in Doc Flowsheets.  Patient was provided with education regarding medication/procedure and possible side effects.  Patient verbalized understanding.     present during visit today: Not Applicable.    Treatment Conditions: Non-applicable.  Premedications: were not ordered.  Drug Waste Record: No  Infusion length and rate:  400 ml/hr., over about 40 minutes  Labs: were drawn per orders.   Vascular access: peripheral IV placed today.    Is the next appt scheduled? yes  Asymptomatic COVID test completed? no    Post Infusion Assessment:  Patient tolerated infusion without incident.  Site patent and intact, free from redness, edema or discomfort.  No evidence of extravasations.  Access discontinued per protocol.     Discharge Plan:   Follow up plan of care with: ongoing infusions at [facility].  Discharge instructions were reviewed with patient.  Patient/representative verbalized understanding of discharge instructions and all questions answered.  Patient discharged from [facility] in stable condition.    Dianne Perry RN    Administrations This Visit     0.9% sodium chloride BOLUS     Admin Date  03/02/2022 Action  New Bag Dose  50 mL  Route  Intravenous Administered By  Dianne Perry, TIFFANY          methylPREDNISolone sodium succinate (solu-MEDROL) 1,000 mg in sodium chloride 0.9 % 291 mL intermittent infusion     Admin Date  03/02/2022 Action  New Bag Dose  1,000 mg Rate  582 mL/hr Route  Intravenous Administered By  Dianne Perry RN            BP (!) 171/91   Pulse 108   Temp 98.5  F (36.9  C) (Oral)   Resp 16   SpO2 96%     Again, thank you for allowing me to participate in the care of your patient.      Sincerely,    [facility]

## 2022-03-02 NOTE — TELEPHONE ENCOUNTER
Patient stool tests negative (having diarrhea for past week).   Discussed diarrhea further with patient - states he is having multiple small BMs a day, sometimes formed sometimes loose. Patient states he might be constipated.     Discussed with Dr. Woods:  -Instructed patient to take Miralax BID through the weekend and see if that helps clean him out. If continues to have diarrhea, then   - decrease mycophenolate to 1000mg BID  - patient to take imodium if needed.     Patient verbalized understanding and agreement of plan. Will call back with questions, concerns, updates.

## 2022-03-02 NOTE — TELEPHONE ENCOUNTER
Call Center Call:     Called by patient after hours, he is getting solumedrol x 3 days 1000 mg. BG check today was 239. He has been on insulin in the past with his steroids. He will check his blood glucoses 1-2 hours post prandially and use the following sliding scale for the next 3 days. He has Novolog that is not  at home and can start using immediately.   If your blood sugars are   151-200: 2U,   201-250: 4U  251-300: 6U  301-350: 8U  351-400: 10 U and call your coordinator    Janee Johns MD  Pulmonary Transplant/CF Attending  Pager: [phone]

## 2022-03-02 NOTE — PATIENT INSTRUCTIONS
Dear Edson Thornton    Thank you for choosing [facility] ([facility]) for your infusion.  The following information is a summary of our appointment as well as important reminders.      We look forward in seeing you on your next appointment here at [facility] ([facility]).  Please don t hesitate to call us at [phone] to reschedule any of your appointments or to speak with one of the [facility] registered nurses.  It was a pleasure taking care of you today.    Sincerely,    [facility]  [address] [place] [address]  [address]  Phone:  [phone]

## 2022-03-03 ENCOUNTER — MEDICAL CORRESPONDENCE (OUTPATIENT)
Dept: HEALTH INFORMATION MANAGEMENT | Facility: CLINIC | Age: 57
End: 2022-03-03

## 2022-03-03 ENCOUNTER — TELEPHONE (OUTPATIENT)
Dept: TRANSPLANT | Facility: CLINIC | Age: 57
End: 2022-03-03

## 2022-03-03 ENCOUNTER — INFUSION THERAPY VISIT (OUTPATIENT)
Dept: INFUSION THERAPY | Facility: CLINIC | Age: 57
End: 2022-03-03
Attending: INTERNAL MEDICINE
Payer: COMMERCIAL

## 2022-03-03 VITALS
OXYGEN SATURATION: 96 % | RESPIRATION RATE: 18 BRPM | DIASTOLIC BLOOD PRESSURE: 92 MMHG | HEART RATE: 95 BPM | SYSTOLIC BLOOD PRESSURE: 149 MMHG | TEMPERATURE: 97.8 F

## 2022-03-03 DIAGNOSIS — R09.89 PULMONARY AIR TRAPPING: ICD-10-CM

## 2022-03-03 DIAGNOSIS — Z94.2 S/P LUNG TRANSPLANT (H): Primary | ICD-10-CM

## 2022-03-03 LAB — GLUCOSE BLDC GLUCOMTR-MCNC: 142 MG/DL (ref 70–99)

## 2022-03-03 PROCEDURE — 82962 GLUCOSE BLOOD TEST: CPT

## 2022-03-03 PROCEDURE — 258N000003 HC RX IP 258 OP 636: Performed by: INTERNAL MEDICINE

## 2022-03-03 PROCEDURE — 96365 THER/PROPH/DIAG IV INF INIT: CPT

## 2022-03-03 PROCEDURE — 250N000011 HC RX IP 250 OP 636: Performed by: INTERNAL MEDICINE

## 2022-03-03 RX ORDER — ALBUTEROL SULFATE 90 UG/1
1-2 AEROSOL, METERED RESPIRATORY (INHALATION)
Status: CANCELLED
Start: 2022-03-04

## 2022-03-03 RX ORDER — MEPERIDINE HYDROCHLORIDE 25 MG/ML
25 INJECTION INTRAMUSCULAR; INTRAVENOUS; SUBCUTANEOUS EVERY 30 MIN PRN
Status: CANCELLED | OUTPATIENT
Start: 2022-03-04

## 2022-03-03 RX ORDER — EPINEPHRINE 1 MG/ML
0.3 INJECTION, SOLUTION INTRAMUSCULAR; SUBCUTANEOUS EVERY 5 MIN PRN
Status: CANCELLED | OUTPATIENT
Start: 2022-03-04

## 2022-03-03 RX ORDER — METHYLPREDNISOLONE SODIUM SUCCINATE 125 MG/2ML
125 INJECTION, POWDER, LYOPHILIZED, FOR SOLUTION INTRAMUSCULAR; INTRAVENOUS
Status: CANCELLED
Start: 2022-03-04

## 2022-03-03 RX ORDER — NALOXONE HYDROCHLORIDE 0.4 MG/ML
0.2 INJECTION, SOLUTION INTRAMUSCULAR; INTRAVENOUS; SUBCUTANEOUS
Status: CANCELLED | OUTPATIENT
Start: 2022-03-04

## 2022-03-03 RX ORDER — DIPHENHYDRAMINE HYDROCHLORIDE 50 MG/ML
50 INJECTION INTRAMUSCULAR; INTRAVENOUS
Status: CANCELLED
Start: 2022-03-04

## 2022-03-03 RX ORDER — ALBUTEROL SULFATE 0.83 MG/ML
2.5 SOLUTION RESPIRATORY (INHALATION)
Status: CANCELLED | OUTPATIENT
Start: 2022-03-04

## 2022-03-03 RX ADMIN — SODIUM CHLORIDE 1000 MG: 9 INJECTION, SOLUTION INTRAVENOUS at 12:11

## 2022-03-03 NOTE — LETTER
"    3/3/2022         RE: Edson Thornton  [address]  [place] [address]        Dear Colleague,    Thank you for referring your patient, Edson Thornton, to the [facility]. Please see a copy of my visit note below.    Chief Complaint   Patient presents with     Infusion     IV Solu-Medrol     Infusion Nursing Note:  Edson Thornton presents today for IV Solu-Medrol. Dose 2/3    Patient seen by provider today: No   present during visit today: Not Applicable.    Note: Infusion given over 45 minutes.    Labs: glucose 142 today. Glucose levels being followed closely by pulm txp team.     Intravenous Access:  Peripheral IV placed.    Treatment Conditions:  Not Applicable.    Post Infusion Assessment:  Patient tolerated infusion without incident.  Site patent and intact, free from redness, edema or discomfort.  No evidence of extravasations.  Access discontinued per protocol.     Discharge Plan:   AVS to patient via MYCHART.  Patient will return tomorrow for next appointment.   Patient discharged in stable condition accompanied by: self.  Departure Mode: Ambulatory.    Administrations This Visit     methylPREDNISolone sodium succinate (solu-MEDROL) 1,000 mg in sodium chloride 0.9 % 283 mL intermittent infusion     Admin Date  03/03/2022 Action  New Bag Dose  1,000 mg Rate  566 mL/hr Route  Intravenous Administered By  Javier King RN                Vital signs:  Temp: 97.8  F (36.6  C) Temp src: Oral BP: (!) 159/98 (recently increased BP meds, asymptomatic) Pulse: 97   Resp: 18 SpO2: 96 % O2 Device: None (Room air)        Estimated body mass index is 26.78 kg/m  as calculated from the following:    Height as of 3/1/22: 1.626 m (5' 4\").    Weight as of 3/1/22: 70.8 kg (156 lb).                            Again, thank you for allowing me to participate in the care of your patient.        Sincerely,        [facility]"

## 2022-03-03 NOTE — TELEPHONE ENCOUNTER
Patient calls to inquire on how to follow up with high blood sugars post solumedrol infusions.   Confirmed patient had sliding scale provided by Dr. Johns yesterday. Requested he follow sliding scale through tomorrow. Patient verbalized understanding and agreement of plan. Will call back with questions, concerns, updates.

## 2022-03-03 NOTE — PATIENT INSTRUCTIONS
Dear Edson Thornton    Thank you for choosing [facility] ([facility]) for your infusion.  The following information is a summary of our appointment as well as important reminders.          We look forward in seeing you on your next appointment here at [facility] ([facility]).  Please don t hesitate to call us at [phone] to reschedule any of your appointments or to speak with one of the [facility] registered nurses.  It was a pleasure taking care of you today.    Sincerely,    [facility]  [address] [place] [address]  [address]  Phone:  [phone]

## 2022-03-03 NOTE — PROGRESS NOTES
"Chief Complaint   Patient presents with     Infusion     IV Solu-Medrol     Infusion Nursing Note:  Edson Thornton presents today for IV Solu-Medrol. Dose 2/3    Patient seen by provider today: No   present during visit today: Not Applicable.    Note: Infusion given over 45 minutes.    Labs: glucose 142 today. Glucose levels being followed closely by pulm txp team.     Intravenous Access:  Peripheral IV placed.    Treatment Conditions:  Not Applicable.    Post Infusion Assessment:  Patient tolerated infusion without incident.  Site patent and intact, free from redness, edema or discomfort.  No evidence of extravasations.  Access discontinued per protocol.     Discharge Plan:   AVS to patient via MYCHART.  Patient will return tomorrow for next appointment.   Patient discharged in stable condition accompanied by: self.  Departure Mode: Ambulatory.    Administrations This Visit     methylPREDNISolone sodium succinate (solu-MEDROL) 1,000 mg in sodium chloride 0.9 % 283 mL intermittent infusion     Admin Date  03/03/2022 Action  New Bag Dose  1,000 mg Rate  566 mL/hr Route  Intravenous Administered By  Javier King RN                Vital signs:  Temp: 97.8  F (36.6  C) Temp src: Oral BP: (!) 159/98 (recently increased BP meds, asymptomatic) Pulse: 97   Resp: 18 SpO2: 96 % O2 Device: None (Room air)        Estimated body mass index is 26.78 kg/m  as calculated from the following:    Height as of 3/1/22: 1.626 m (5' 4\").    Weight as of 3/1/22: 70.8 kg (156 lb).                        "

## 2022-03-04 ENCOUNTER — HOSPITAL ENCOUNTER (OUTPATIENT)
Dept: CARDIAC REHAB | Facility: CLINIC | Age: 57
End: 2022-03-04
Attending: INTERNAL MEDICINE
Payer: COMMERCIAL

## 2022-03-04 ENCOUNTER — INFUSION THERAPY VISIT (OUTPATIENT)
Dept: INFUSION THERAPY | Facility: CLINIC | Age: 57
End: 2022-03-04
Attending: INTERNAL MEDICINE
Payer: COMMERCIAL

## 2022-03-04 VITALS
RESPIRATION RATE: 20 BRPM | SYSTOLIC BLOOD PRESSURE: 158 MMHG | TEMPERATURE: 98.4 F | DIASTOLIC BLOOD PRESSURE: 82 MMHG | OXYGEN SATURATION: 95 % | HEART RATE: 108 BPM

## 2022-03-04 DIAGNOSIS — R09.89 PULMONARY AIR TRAPPING: ICD-10-CM

## 2022-03-04 DIAGNOSIS — Z94.2 S/P LUNG TRANSPLANT (H): Primary | ICD-10-CM

## 2022-03-04 LAB
ANION GAP SERPL CALCULATED.3IONS-SCNC: 9 MMOL/L (ref 3–14)
BASOPHILS # BLD MANUAL: 0 10E3/UL (ref 0–0.2)
BASOPHILS NFR BLD MANUAL: 0 %
BUN SERPL-MCNC: 30 MG/DL (ref 7–30)
CALCIUM SERPL-MCNC: 8.9 MG/DL (ref 8.5–10.1)
CHLORIDE BLD-SCNC: 109 MMOL/L (ref 94–109)
CO2 SERPL-SCNC: 24 MMOL/L (ref 20–32)
CREAT SERPL-MCNC: 0.99 MG/DL (ref 0.66–1.25)
EOSINOPHIL # BLD MANUAL: 0 10E3/UL (ref 0–0.7)
EOSINOPHIL NFR BLD MANUAL: 0 %
ERYTHROCYTE [DISTWIDTH] IN BLOOD BY AUTOMATED COUNT: 17.2 % (ref 10–15)
GFR SERPL CREATININE-BSD FRML MDRD: 89 ML/MIN/1.73M2
GLUCOSE BLD-MCNC: 227 MG/DL (ref 70–99)
HCT VFR BLD AUTO: 35.9 % (ref 40–53)
HGB BLD-MCNC: 11.5 G/DL (ref 13.3–17.7)
LYMPHOCYTES # BLD MANUAL: 0.4 10E3/UL (ref 0.8–5.3)
LYMPHOCYTES NFR BLD MANUAL: 4 %
MCH RBC QN AUTO: 27.1 PG (ref 26.5–33)
MCHC RBC AUTO-ENTMCNC: 32 G/DL (ref 31.5–36.5)
MCV RBC AUTO: 85 FL (ref 78–100)
MONOCYTES # BLD MANUAL: 0.4 10E3/UL (ref 0–1.3)
MONOCYTES NFR BLD MANUAL: 4 %
NEUTROPHILS # BLD MANUAL: 9 10E3/UL (ref 1.6–8.3)
NEUTROPHILS NFR BLD MANUAL: 92 %
PLAT MORPH BLD: ABNORMAL
PLATELET # BLD AUTO: 221 10E3/UL (ref 150–450)
POTASSIUM BLD-SCNC: 3.7 MMOL/L (ref 3.4–5.3)
RBC # BLD AUTO: 4.25 10E6/UL (ref 4.4–5.9)
RBC MORPH BLD: ABNORMAL
SODIUM SERPL-SCNC: 142 MMOL/L (ref 133–144)
WBC # BLD AUTO: 9.8 10E3/UL (ref 4–11)

## 2022-03-04 PROCEDURE — 250N000011 HC RX IP 250 OP 636: Performed by: INTERNAL MEDICINE

## 2022-03-04 PROCEDURE — 258N000003 HC RX IP 258 OP 636: Performed by: INTERNAL MEDICINE

## 2022-03-04 PROCEDURE — G0239 OTH RESP PROC, GROUP: HCPCS

## 2022-03-04 PROCEDURE — 80048 BASIC METABOLIC PNL TOTAL CA: CPT | Performed by: INTERNAL MEDICINE

## 2022-03-04 PROCEDURE — 85027 COMPLETE CBC AUTOMATED: CPT | Performed by: INTERNAL MEDICINE

## 2022-03-04 PROCEDURE — 96365 THER/PROPH/DIAG IV INF INIT: CPT

## 2022-03-04 RX ORDER — ALBUTEROL SULFATE 90 UG/1
1-2 AEROSOL, METERED RESPIRATORY (INHALATION)
Status: CANCELLED
Start: 2022-03-04

## 2022-03-04 RX ORDER — NALOXONE HYDROCHLORIDE 0.4 MG/ML
0.2 INJECTION, SOLUTION INTRAMUSCULAR; INTRAVENOUS; SUBCUTANEOUS
Status: CANCELLED | OUTPATIENT
Start: 2022-03-04

## 2022-03-04 RX ORDER — MEPERIDINE HYDROCHLORIDE 25 MG/ML
25 INJECTION INTRAMUSCULAR; INTRAVENOUS; SUBCUTANEOUS EVERY 30 MIN PRN
Status: CANCELLED | OUTPATIENT
Start: 2022-03-04

## 2022-03-04 RX ORDER — ALBUTEROL SULFATE 0.83 MG/ML
2.5 SOLUTION RESPIRATORY (INHALATION)
Status: CANCELLED | OUTPATIENT
Start: 2022-03-04

## 2022-03-04 RX ORDER — METHYLPREDNISOLONE SODIUM SUCCINATE 125 MG/2ML
125 INJECTION, POWDER, LYOPHILIZED, FOR SOLUTION INTRAMUSCULAR; INTRAVENOUS
Status: CANCELLED
Start: 2022-03-04

## 2022-03-04 RX ORDER — EPINEPHRINE 1 MG/ML
0.3 INJECTION, SOLUTION INTRAMUSCULAR; SUBCUTANEOUS EVERY 5 MIN PRN
Status: CANCELLED | OUTPATIENT
Start: 2022-03-04

## 2022-03-04 RX ORDER — DIPHENHYDRAMINE HYDROCHLORIDE 50 MG/ML
50 INJECTION INTRAMUSCULAR; INTRAVENOUS
Status: CANCELLED
Start: 2022-03-04

## 2022-03-04 RX ADMIN — SODIUM CHLORIDE 1000 MG: 9 INJECTION, SOLUTION INTRAVENOUS at 15:27

## 2022-03-04 ASSESSMENT — PAIN SCALES - GENERAL: PAINLEVEL: NO PAIN (0)

## 2022-03-04 NOTE — LETTER
3/4/2022         RE: Edson Thornton  [address]  [place] [address]        Dear Colleague,    Thank you for referring your patient, Edson Thornton, to the [facility]. Please see a copy of my visit note below.    Infusion Nursing Note:  Edson Thornton presents today for Solu-Medrol day 3/3.    Patient seen by provider today: No   present during visit today: Not Applicable.    Note:   -Solu-Medrol infused over ~30 min  -Elevated BG, working closely with transplant team to manage (on a sliding scale per pt)    Intravenous Access:  Labs drawn without difficulty.  Peripheral IV placed.    Treatment Conditions:  Not Applicable.    Post Infusion Assessment:  Patient tolerated infusion without incident.  Site patent and intact, free from redness, edema or discomfort.  No evidence of extravasations.  Access discontinued per protocol.     Discharge Plan:   AVS to patient via MYCHART.  Patient will return to PCP for next appointment.   Patient discharged in stable condition accompanied by: self.  Departure Mode: wheelchair    Autumn Peng, TIFFANY    BP (!) 178/92   Pulse 108   Temp 98.4  F (36.9  C) (Oral)   Resp 20   SpO2 95%      Administrations This Visit     methylPREDNISolone sodium succinate (solu-MEDROL) 1,000 mg in sodium chloride 0.9 % 283 mL intermittent infusion     Admin Date  03/04/2022 Action  New Bag Dose  1,000 mg Rate  566 mL/hr Route  Intravenous Administered By  Autumn Peng, RN                  Again, thank you for allowing me to participate in the care of your patient.      Sincerely,    [facility]

## 2022-03-04 NOTE — PROGRESS NOTES
Infusion Nursing Note:  Edson Thornton presents today for Solu-Medrol day 3/3.    Patient seen by provider today: No   present during visit today: Not Applicable.    Note:   -Solu-Medrol infused over ~30 min  -Elevated BG, working closely with transplant team to manage (on a sliding scale per pt)    Intravenous Access:  Labs drawn without difficulty.  Peripheral IV placed.    Treatment Conditions:  Not Applicable.    Post Infusion Assessment:  Patient tolerated infusion without incident.  Site patent and intact, free from redness, edema or discomfort.  No evidence of extravasations.  Access discontinued per protocol.     Discharge Plan:   AVS to patient via MYCHART.  Patient will return to PCP for next appointment.   Patient discharged in stable condition accompanied by: self.  Departure Mode: wheelchair    Autumn Peng, TIFFANY    BP (!) 178/92   Pulse 108   Temp 98.4  F (36.9  C) (Oral)   Resp 20   SpO2 95%      Administrations This Visit     methylPREDNISolone sodium succinate (solu-MEDROL) 1,000 mg in sodium chloride 0.9 % 283 mL intermittent infusion     Admin Date  03/04/2022 Action  New Bag Dose  1,000 mg Rate  566 mL/hr Route  Intravenous Administered By  Autumn Peng, RN

## 2022-03-04 NOTE — PATIENT INSTRUCTIONS
Patient Education     Solu-Medrol Injection 1 g  Uses  This medicine is used for the following purposes:    allergic reaction    autoimmune disorder    blood disorder    diagnostic test    endocrine disorder    inflammatory disease    immune suppression    cancer    COVID-19 (coronavirus)  Instructions  Carefully follow the instructions for preparing this medicine before injection.  Read and make sure you understand the instructions for measuring your dose and using the syringe before using this medicine.  The medicine needs to be mixed before being injected.  Do not dilute the medicine until ready to use.  If using the vial, do not remove the medicine from the vial until ready to use.  Always inspect the medicine before using.  Do not use the medicine if it contains any particles or if it has changed color.  Keep at room temperature until mixing. Refrigerate any extra mixed medicine until ready to use it.  Protect medicine from light.  Inject the medicine immediately after mixing.  Never use any medicine that has .  Ask your doctor, nurse or pharmacist to show you how to use this medicine correctly.  It is important that you keep taking each dose of this medicine on time even if you are feeling well.  If you miss a dose, contact your doctor for instructions.  Please tell your doctor and pharmacist about all the medicines you take. Include both prescription and over-the-counter medicines. Also tell them about any vitamins, herbal medicines, or anything else you take for your health.  If your symptoms do not improve or they worsen while on this medicine, contact your doctor.  Talk to your doctor before taking other medicines, including aspirins and ibuprofen containing products. Speak to your doctor about which medicines are safe to use while you are on this medicine.  Do not suddenly stop taking this medicine. Check with your doctor before stopping.  This medicine may affect your blood sugar levels. If you  have diabetes, talk to your doctor before changing the dose of your diabetes medicine.  This medicine may affect the strength of your bones. If you have or are at increased risk for developing osteoporosis (weakening of the bones), your doctor may recommend adding foods containing calcium and vitamin D while on this medicine. Please talk to your doctor for more information.  You may need vitamin and mineral supplements while on this medicine. Please speak with your doctor or pharmacist.  It is very important that you follow your doctor's instructions for all blood tests.  It is very important that you keep all appointments for medical exams and tests while on this medicine.  Cautions  Tell your doctor and pharmacist if you ever had an allergic reaction to a medicine. Symptoms of an allergic reaction can include trouble breathing, skin rash, itching, swelling, or severe dizziness.  Do not use the medication any more than instructed.  Please check with your doctor before drinking alcohol while on this medicine.  This medicine may reduce your body's ability to fight infections. Try to avoid contact with people with colds, flu or other infections.  Contact your doctor if you develop any signs of a new infection such as fever, cough, sore throat, or chills.  Wash your hands often and avoid close contact with people with infections such as colds and flu.  Speak with your health care provider before receiving any vaccinations.  Tell the doctor or pharmacist if you are pregnant, planning to be pregnant, or breastfeeding.  Do not take Norbert's wort while on this medicine.  Ask your pharmacist if this medicine can interact with any of your other medicines. Be sure to tell them about all the medicines you take.  Please tell all your doctors and dentists that you are on this medicine before they provide care.  Do not start or stop any other medicines without first speaking to your doctor or pharmacist.  Used needles and  syringes should be thrown away properly in a medical waste container. Ask your doctor or pharmacist if you need help.  Do not share this medicine with anyone who has not been prescribed this medicine.  Side Effects  The following is a list of some common side effects from this medicine. Please speak with your doctor about what you should do if you experience these or other side effects.    acne    agitated feeling or trouble sleeping    increased appetite    increased risk of bleeding    dizziness    headaches    high blood sugar    high blood pressure    increased risk for an infection    reaction at the area of the injection (pain, redness, swelling)    nausea    pain near injection site    stomach upset or abdominal pain    sweating    vomiting  Call your doctor or get medical help right away if you notice any of these more serious side effects:    severe or persistent bone, joint or jaw pain    unusual bruising or discoloration on skin    depression or feeling sad    swelling of the legs, feet, and hands    fever or chills    flu-like symptoms    hair growth    fast or irregular heart beats    symptoms of liver damage (such as yellowing of skin or eyes, dark urine, unusual tiredness or weakness; severe stomach or back pain)    menstruation changes (missed or fewer periods)    mood changes    muscle weakness    seizures    thinning of the skin    yeast infection of mouth    unusual or unexplained tiredness or weakness    vaginal itching or yeast infection    blurring or changes of vision    sudden or unexplained weight gain    slow wound healing  A few people may have an allergic reactions to this medicine. Symptoms can include difficulty breathing, skin rash, itching, swelling, or severe dizziness. If you notice any of these symptoms, seek medical help quickly.  Extra  Please speak with your doctor, nurse, or pharmacist if you have any questions about this  medicine.  [email or web address]  IMPORTANT NOTE: This document tells you briefly how to take your medicine, but it does not tell you all there is to know about it.Your doctor or pharmacist may give you other documents about your medicine. Please talk to them if you have any questions.Always follow their advice. There is a more complete description of this medicine available in English.Scan this code on your smartphone or tablet or use the web address below. You can also ask your pharmacist for a printout. If you have any questions, please ask your pharmacist.     2021 [employer].         Dear Edson Thornton    Thank you for choosing [facility] for your infusion.  The following information is a summary of our appointment as well as important reminders.      We look forward in seeing you on your next appointment here at [facility]  Please don t hesitate to call us at [phone] to reschedule any of your appointments or to speak with one of the [facility] registered nurses.  It was a pleasure taking care of you today.    Sincerely,    [facility]  [facility]  [address] [place] [address]  [id number]  Phone:  [phone]

## 2022-03-08 ENCOUNTER — HOSPITAL ENCOUNTER (OUTPATIENT)
Dept: CARDIAC REHAB | Facility: CLINIC | Age: 57
End: 2022-03-08
Attending: INTERNAL MEDICINE
Payer: COMMERCIAL

## 2022-03-08 ENCOUNTER — TELEPHONE (OUTPATIENT)
Dept: OTOLARYNGOLOGY | Facility: CLINIC | Age: 57
End: 2022-03-08
Payer: COMMERCIAL

## 2022-03-08 PROCEDURE — G0239 OTH RESP PROC, GROUP: HCPCS

## 2022-03-08 NOTE — TELEPHONE ENCOUNTER
Spoke to patient regarding audiogram results from 3/1/22.  Audiogram did show mild sloping to moderate sensorineural hearing loss bilaterally.  Patient would be an excellent candidate for hearing aids which could improve both the muffled ear feeling and also his tinnitus.  He is medically cleared for hearing aids and plans to pursue obtaining these hearing aids once he is back in [place].  I will provide a letter of medical clearance that will be available via [employer] and patient can stop by clinic to  a copy of his audiogram at his convenience.    Guadalupe Bell DNP, APRN, CNP.  3/8/2022 3:24 PM

## 2022-03-08 NOTE — LETTER
Hearing Aid Medical Clearance    Edson Thornton  March 8, 2022        This patient has received a medical examination and may be considered a suitable candidate for a hearing aid.       Provider:__________________________________________________     Guadalupe Bell DNP, ILIR, CNP.

## 2022-03-10 ENCOUNTER — TELEPHONE (OUTPATIENT)
Dept: TRANSPLANT | Facility: CLINIC | Age: 57
End: 2022-03-10
Payer: COMMERCIAL

## 2022-03-10 DIAGNOSIS — I10 BENIGN ESSENTIAL HYPERTENSION: ICD-10-CM

## 2022-03-10 NOTE — TELEPHONE ENCOUNTER
Pt had a dose change on his BP meds  He is out early- needs new Rx called to [facility] in the clinic to fill  Also ? CT on Tues still in the plans?

## 2022-03-11 ENCOUNTER — HOSPITAL ENCOUNTER (OUTPATIENT)
Dept: CARDIAC REHAB | Facility: CLINIC | Age: 57
Discharge: HOME | End: 2022-03-11
Attending: INTERNAL MEDICINE
Payer: COMMERCIAL

## 2022-03-11 PROCEDURE — G0239 OTH RESP PROC, GROUP: HCPCS

## 2022-03-11 RX ORDER — AMLODIPINE BESYLATE 5 MG/1
10 TABLET ORAL DAILY
Qty: 60 TABLET | Refills: 11 | Status: SHIPPED | OUTPATIENT
Start: 2022-03-11 | End: 2022-03-15

## 2022-03-11 NOTE — TELEPHONE ENCOUNTER
Sent new Rx for amlodipine 10mg daily.   No repeat Chest CT after solumedrol 1g x 3 days. Will see what PFTs look like at next visit.     Left VM with plan. Requested call back with questions, concerns, updates.

## 2022-03-13 ASSESSMENT — ENCOUNTER SYMPTOMS: FEVER: 1

## 2022-03-15 ENCOUNTER — INFUSION THERAPY VISIT (OUTPATIENT)
Dept: INFUSION THERAPY | Facility: CLINIC | Age: 57
End: 2022-03-15
Attending: INTERNAL MEDICINE
Payer: COMMERCIAL

## 2022-03-15 ENCOUNTER — HOSPITAL ENCOUNTER (OUTPATIENT)
Dept: CARDIAC REHAB | Facility: CLINIC | Age: 57
Discharge: HOME | End: 2022-03-15
Attending: INTERNAL MEDICINE
Payer: COMMERCIAL

## 2022-03-15 ENCOUNTER — LAB (OUTPATIENT)
Dept: LAB | Facility: CLINIC | Age: 57
End: 2022-03-15
Attending: INTERNAL MEDICINE
Payer: COMMERCIAL

## 2022-03-15 ENCOUNTER — ANTICOAGULATION THERAPY VISIT (OUTPATIENT)
Dept: ANTICOAGULATION | Facility: CLINIC | Age: 57
End: 2022-03-15

## 2022-03-15 ENCOUNTER — ANCILLARY PROCEDURE (OUTPATIENT)
Dept: GENERAL RADIOLOGY | Facility: CLINIC | Age: 57
End: 2022-03-15
Attending: INTERNAL MEDICINE
Payer: COMMERCIAL

## 2022-03-15 ENCOUNTER — OFFICE VISIT (OUTPATIENT)
Dept: TRANSPLANT | Facility: CLINIC | Age: 57
End: 2022-03-15
Attending: INTERNAL MEDICINE
Payer: COMMERCIAL

## 2022-03-15 VITALS
HEART RATE: 120 BPM | SYSTOLIC BLOOD PRESSURE: 159 MMHG | WEIGHT: 157 LBS | TEMPERATURE: 98.9 F | BODY MASS INDEX: 26.95 KG/M2 | OXYGEN SATURATION: 97 % | DIASTOLIC BLOOD PRESSURE: 102 MMHG

## 2022-03-15 VITALS
DIASTOLIC BLOOD PRESSURE: 87 MMHG | TEMPERATURE: 98.3 F | RESPIRATION RATE: 18 BRPM | SYSTOLIC BLOOD PRESSURE: 145 MMHG | OXYGEN SATURATION: 94 % | HEART RATE: 110 BPM

## 2022-03-15 DIAGNOSIS — D84.9 IMMUNOSUPPRESSED STATUS (H): ICD-10-CM

## 2022-03-15 DIAGNOSIS — Z94.2 S/P LUNG TRANSPLANT (H): Primary | ICD-10-CM

## 2022-03-15 DIAGNOSIS — Z94.2 S/P LUNG TRANSPLANT (H): Chronic | ICD-10-CM

## 2022-03-15 DIAGNOSIS — Z79.899 ENCOUNTER FOR LONG-TERM (CURRENT) USE OF HIGH-RISK MEDICATION: ICD-10-CM

## 2022-03-15 DIAGNOSIS — M06.9 RHEUMATOID ARTHRITIS INVOLVING BOTH HANDS, UNSPECIFIED WHETHER RHEUMATOID FACTOR PRESENT (H): ICD-10-CM

## 2022-03-15 DIAGNOSIS — I10 BENIGN ESSENTIAL HYPERTENSION: ICD-10-CM

## 2022-03-15 DIAGNOSIS — K59.01 SLOW TRANSIT CONSTIPATION: ICD-10-CM

## 2022-03-15 DIAGNOSIS — I48.91 ATRIAL FIBRILLATION, UNSPECIFIED TYPE (H): ICD-10-CM

## 2022-03-15 DIAGNOSIS — E83.42 HYPOMAGNESEMIA: Primary | ICD-10-CM

## 2022-03-15 DIAGNOSIS — Z94.2 S/P LUNG TRANSPLANT (H): ICD-10-CM

## 2022-03-15 DIAGNOSIS — T86.819 COMPLICATION OF TRANSPLANTED LUNG, UNSPECIFIED COMPLICATION (H): ICD-10-CM

## 2022-03-15 DIAGNOSIS — E83.42 HYPOMAGNESEMIA: ICD-10-CM

## 2022-03-15 DIAGNOSIS — Z94.2 LUNG REPLACED BY TRANSPLANT (H): ICD-10-CM

## 2022-03-15 DIAGNOSIS — I48.91 ATRIAL FIBRILLATION, UNSPECIFIED TYPE (H): Primary | ICD-10-CM

## 2022-03-15 DIAGNOSIS — I63.9 ISCHEMIC STROKE (H): ICD-10-CM

## 2022-03-15 DIAGNOSIS — R00.0 TACHYCARDIA: Primary | ICD-10-CM

## 2022-03-15 LAB
ANION GAP SERPL CALCULATED.3IONS-SCNC: 11 MMOL/L (ref 3–14)
BUN SERPL-MCNC: 22 MG/DL (ref 7–30)
CALCIUM SERPL-MCNC: 9.2 MG/DL (ref 8.5–10.1)
CHLORIDE BLD-SCNC: 108 MMOL/L (ref 94–109)
CMV DNA SPEC NAA+PROBE-ACNC: NOT DETECTED IU/ML
CO2 SERPL-SCNC: 25 MMOL/L (ref 20–32)
CREAT SERPL-MCNC: 1.18 MG/DL (ref 0.66–1.25)
ERYTHROCYTE [DISTWIDTH] IN BLOOD BY AUTOMATED COUNT: 17 % (ref 10–15)
EXPTIME-PRE: 8.77 SEC
FEF2575-%PRED-PRE: 25 %
FEF2575-PRE: 0.71 L/SEC
FEF2575-PRED: 2.83 L/SEC
FEFMAX-%PRED-PRE: 54 %
FEFMAX-PRE: 4.58 L/SEC
FEFMAX-PRED: 8.37 L/SEC
FEV1-%PRED-PRE: 43 %
FEV1-PRE: 1.37 L
FEV1FEV6-PRE: 64 %
FEV1FEV6-PRED: 80 %
FEV1FVC-PRE: 64 %
FEV1FVC-PRED: 79 %
FIFMAX-PRE: 5.1 L/SEC
FVC-%PRED-PRE: 54 %
FVC-PRE: 2.15 L
FVC-PRED: 3.98 L
GFR SERPL CREATININE-BSD FRML MDRD: 72 ML/MIN/1.73M2
GLUCOSE BLD-MCNC: 105 MG/DL (ref 70–99)
HCT VFR BLD AUTO: 38.1 % (ref 40–53)
HGB BLD-MCNC: 12.3 G/DL (ref 13.3–17.7)
INR PPP: 2.11 (ref 0.85–1.15)
MAGNESIUM SERPL-MCNC: 1.2 MG/DL (ref 1.6–2.3)
MCH RBC QN AUTO: 26.9 PG (ref 26.5–33)
MCHC RBC AUTO-ENTMCNC: 32.3 G/DL (ref 31.5–36.5)
MCV RBC AUTO: 83 FL (ref 78–100)
PLATELET # BLD AUTO: 187 10E3/UL (ref 150–450)
POTASSIUM BLD-SCNC: 3.6 MMOL/L (ref 3.4–5.3)
RBC # BLD AUTO: 4.58 10E6/UL (ref 4.4–5.9)
SODIUM SERPL-SCNC: 144 MMOL/L (ref 133–144)
TACROLIMUS BLD-MCNC: 14.1 UG/L (ref 5–15)
TME LAST DOSE: NORMAL H
TME LAST DOSE: NORMAL H
WBC # BLD AUTO: 9.7 10E3/UL (ref 4–11)

## 2022-03-15 PROCEDURE — 99207 PR RESPIRATORY FLOW VOLUME LOOP: CPT

## 2022-03-15 PROCEDURE — 71046 X-RAY EXAM CHEST 2 VIEWS: CPT | Mod: GC | Performed by: RADIOLOGY

## 2022-03-15 PROCEDURE — 80197 ASSAY OF TACROLIMUS: CPT | Mod: 90 | Performed by: PATHOLOGY

## 2022-03-15 PROCEDURE — 96365 THER/PROPH/DIAG IV INF INIT: CPT

## 2022-03-15 PROCEDURE — 86832 HLA CLASS I HIGH DEFIN QUAL: CPT | Performed by: PATHOLOGY

## 2022-03-15 PROCEDURE — 86833 HLA CLASS II HIGH DEFIN QUAL: CPT | Performed by: PATHOLOGY

## 2022-03-15 PROCEDURE — 85027 COMPLETE CBC AUTOMATED: CPT | Performed by: PATHOLOGY

## 2022-03-15 PROCEDURE — 83735 ASSAY OF MAGNESIUM: CPT | Performed by: PATHOLOGY

## 2022-03-15 PROCEDURE — 36415 COLL VENOUS BLD VENIPUNCTURE: CPT | Performed by: PATHOLOGY

## 2022-03-15 PROCEDURE — 85610 PROTHROMBIN TIME: CPT | Performed by: PATHOLOGY

## 2022-03-15 PROCEDURE — 96366 THER/PROPH/DIAG IV INF ADDON: CPT

## 2022-03-15 PROCEDURE — 250N000011 HC RX IP 250 OP 636: Performed by: INTERNAL MEDICINE

## 2022-03-15 PROCEDURE — 99000 SPECIMEN HANDLING OFFICE-LAB: CPT | Performed by: PATHOLOGY

## 2022-03-15 PROCEDURE — 80048 BASIC METABOLIC PNL TOTAL CA: CPT | Performed by: PATHOLOGY

## 2022-03-15 PROCEDURE — 87799 DETECT AGENT NOS DNA QUANT: CPT | Mod: 90 | Performed by: PATHOLOGY

## 2022-03-15 PROCEDURE — G0239 OTH RESP PROC, GROUP: HCPCS

## 2022-03-15 RX ORDER — HEPARIN SODIUM,PORCINE 10 UNIT/ML
5 VIAL (ML) INTRAVENOUS
Status: CANCELLED | OUTPATIENT
Start: 2022-03-15

## 2022-03-15 RX ORDER — DIPHENHYDRAMINE HYDROCHLORIDE 50 MG/ML
50 INJECTION INTRAMUSCULAR; INTRAVENOUS
Status: CANCELLED
Start: 2022-03-15

## 2022-03-15 RX ORDER — ALBUTEROL SULFATE 90 UG/1
1-2 AEROSOL, METERED RESPIRATORY (INHALATION)
Status: CANCELLED
Start: 2022-03-15

## 2022-03-15 RX ORDER — EPINEPHRINE 1 MG/ML
0.3 INJECTION, SOLUTION, CONCENTRATE INTRAVENOUS EVERY 5 MIN PRN
Status: CANCELLED | OUTPATIENT
Start: 2022-03-15

## 2022-03-15 RX ORDER — MAGNESIUM SULFATE HEPTAHYDRATE 40 MG/ML
4 INJECTION, SOLUTION INTRAVENOUS ONCE
Status: CANCELLED
Start: 2022-03-15 | End: 2022-03-15

## 2022-03-15 RX ORDER — METOPROLOL TARTRATE 25 MG/1
12.5 TABLET, FILM COATED ORAL 2 TIMES DAILY
Qty: 60 TABLET | Refills: 11 | Status: SHIPPED | OUTPATIENT
Start: 2022-03-15 | End: 2022-03-21

## 2022-03-15 RX ORDER — METHYLPREDNISOLONE SODIUM SUCCINATE 125 MG/2ML
125 INJECTION, POWDER, LYOPHILIZED, FOR SOLUTION INTRAMUSCULAR; INTRAVENOUS
Status: CANCELLED
Start: 2022-03-15

## 2022-03-15 RX ORDER — NALOXONE HYDROCHLORIDE 0.4 MG/ML
0.2 INJECTION, SOLUTION INTRAMUSCULAR; INTRAVENOUS; SUBCUTANEOUS
Status: CANCELLED | OUTPATIENT
Start: 2022-03-15

## 2022-03-15 RX ORDER — WARFARIN SODIUM 1 MG/1
TABLET ORAL
Qty: 90 TABLET | Refills: 1 | Status: SHIPPED | OUTPATIENT
Start: 2022-03-15 | End: 2022-03-24

## 2022-03-15 RX ORDER — ALBUTEROL SULFATE 0.83 MG/ML
2.5 SOLUTION RESPIRATORY (INHALATION)
Status: CANCELLED | OUTPATIENT
Start: 2022-03-15

## 2022-03-15 RX ORDER — MAGNESIUM SULFATE HEPTAHYDRATE 40 MG/ML
4 INJECTION, SOLUTION INTRAVENOUS ONCE
Status: COMPLETED | OUTPATIENT
Start: 2022-03-15 | End: 2022-03-15

## 2022-03-15 RX ORDER — HEPARIN SODIUM (PORCINE) LOCK FLUSH IV SOLN 100 UNIT/ML 100 UNIT/ML
5 SOLUTION INTRAVENOUS
Status: CANCELLED | OUTPATIENT
Start: 2022-03-15

## 2022-03-15 RX ORDER — MEPERIDINE HYDROCHLORIDE 25 MG/ML
25 INJECTION INTRAMUSCULAR; INTRAVENOUS; SUBCUTANEOUS EVERY 30 MIN PRN
Status: CANCELLED | OUTPATIENT
Start: 2022-03-15

## 2022-03-15 RX ORDER — AMLODIPINE BESYLATE 5 MG/1
10 TABLET ORAL AT BEDTIME
Qty: 60 TABLET | Refills: 11 | COMMUNITY
Start: 2022-03-15 | End: 2022-03-24

## 2022-03-15 RX ORDER — AMOXICILLIN 250 MG
2 CAPSULE ORAL 2 TIMES DAILY
Qty: 120 TABLET | Refills: 11 | Status: SHIPPED | OUTPATIENT
Start: 2022-03-15 | End: 2022-03-24

## 2022-03-15 RX ADMIN — MAGNESIUM SULFATE HEPTAHYDRATE 4 G: 40 INJECTION, SOLUTION INTRAVENOUS at 12:55

## 2022-03-15 NOTE — PATIENT INSTRUCTIONS
Patient Instructions  1. Continue to hydrate with 60-70 oz fluids daily.  2. Continue to exercise daily or most days of the week.  3. Follow up with your primary care provider for annual gender health maintenance procedures.  4. Follow up with colonoscopy schedule.  5. Follow up with annual dermatology visits.  6. It doesn't seem like the COVID vaccine is working well in lung transplant patients. A number of lung transplant patients have gotten sick with COVID even after receiving the vaccines.  Based on our recent experience, it can be life-threatening to get COVID  even after being vaccinated. Please continue to act like you did not get the COVID vaccine - social distancing, wearing a mask, good hand hygiene, etc. If the people around you are vaccinated, it will help reduce the risk of you getting COVID. All members of your household should be vaccinated.  7. You are schedule for IV magnesium at the [facility] (same campus as pulmonary rehab) infusion center at 12:30 today.   8. We are going to start you on metoprolol 12.5mg twice a day. Give Mady a call on Friday and let her know what your blood pressure is running.   9. For your constipation Start Senna-S 2 tablets twice a day AND Miralax twice a day. Once things are regular, just take Senna-S 1 tablet twice a day.   10. For your joint pain, try using diclofenac (Volteran) gel.      Next transplant clinic appointment: 1 week with CXR, labs and PFTs  Next lab draw: tomorrow for a magnesium level.       ~~~~~~~~~~~~~~~~~~~~~~~~~    Thoracic Transplant Office phone [phone], fax [phone]    Office Hours 8:30 - 5:00     For after-hours urgent issues, please dial [phone], and ask to speak with the Thoracic Transplant Coordinator On-Call.  --------------------  To expedite your medication refill(s), please contact your pharmacy and have them fax a refill request to: [phone]  .   *Please allow 3 business days for routine medication refills.  *Please  allow 5 business days for controlled substance medication refills.    **For Diabetic medications and supplies refill(s), please contact your pharmacy and have them contact your Endocrine team.  --------------------  For scheduling appointments call [phone].  --------------------  Please Note: If you are active on [employer], all future test results will be sent by [employer] message only, and will no longer be called to patient. You may also receive communication directly from your physician.

## 2022-03-15 NOTE — PROGRESS NOTES
"Transplant Coordinator Note    Reason for visit: Post lung transplant follow up visit   Coordinator: Present   Caregiver:  DaughterZenia    Health concerns addressed today:  1. Respiratory - winded with activities (got 1g solumedrol x 3 since last visit). Can do more activities now before have to stop. Coughing a little more than normal. Sometimes dry, sometimes productive - yellowish/brown  2. GI: appetite \"good\". Intermittent lower abdominal pain- constipated, difficult to move stools, has been occurring on and off.   3. Night sweats, not new  4. ENT: no sinus issues, clear runny nose. At baseline.      Activity/rehab: pulmonary rehab. Exercising between pulmonary rehab  Oxygen needs: room air  Pain management/RX: joint pain - appointment with rheumatology  Diabetic management: managed by endocrine  Next Bronch due:   CMV status: D-/R-  EBV status: D+/R+  AC/asa: Warfarin  PJP prophylactic: Bactrim    COVID:  1. COVID-19 infection (yes/no, date of most recent positive test):   2. Status/instructions given about COVID-19 vaccine:     Pt Education: medications (use/dose/side effects), how/when to call coordinator, frequency of labs, s/s of infection/rejection, call prior to starting any new medications, lab/vital sign book    Health Maintenance:     Last colonoscopy:     Next colonoscopy due:     Dermatology:    Vaccinations this visit:     Labs, CXR, PFTs reviewed with patient  Medication record reviewed and reconciled  Questions and concerns addressed    Patient Instructions  1. Continue to hydrate with 60-70 oz fluids daily.  2. Continue to exercise daily or most days of the week.  3. Follow up with your primary care provider for annual gender health maintenance procedures.  4. Follow up with colonoscopy schedule.  5. Follow up with annual dermatology visits.  6. It doesn't seem like the COVID vaccine is working well in lung transplant patients. A number of lung transplant patients have gotten sick with COVID " even after receiving the vaccines.  Based on our recent experience, it can be life-threatening to get COVID  even after being vaccinated. Please continue to act like you did not get the COVID vaccine - social distancing, wearing a mask, good hand hygiene, etc. If the people around you are vaccinated, it will help reduce the risk of you getting COVID. All members of your household should be vaccinated.  7. You are schedule for IV magnesium at the [facility] ([facility] campus as pulmonary rehab) infusion center at 12:30 today.   8. We are going to start you on metoprolol 12.5mg twice a day. Give Mady a call on Friday and let her know what your blood pressure is running.   9. For your constipation Start Senna-S 2 tablets twice a day AND Miralax twice a day. Once things are regular, just take Senna-S 1 tablet twice a day.   10. For your joint pain, try using diclofenac (Volteran) gel.      Next transplant clinic appointment: 1 week with CXR, labs and PFTs  Next lab draw: tomorrow for a magnesium level.       AVS printed at time of check out

## 2022-03-15 NOTE — PROGRESS NOTES
ANTICOAGULATION MANAGEMENT     Edson Thornton 56 year old male is on warfarin with therapeutic INR result. (Goal INR 2.0-3.0)    Recent labs: (last 7 days)     03/15/22  0803   INR 2.11*       ASSESSMENT     Source(s): Chart Review and Patient/Caregiver Call     Warfarin doses taken: Less warfarin taken than planned which may be affecting INR  Diet: No new diet changes identified  New illness, injury, or hospitalization: No  Medication/supplement changes: Start Diclofenac, Metoprolol, Senna, Change Amlodipine dose, Stop Folgard    Concurrent use of ANTICOAGULANTS and NSAIDS may result in increased risk of bleeding.  Signs or symptoms of bleeding or clotting: No  Previous INR: Therapeutic last 2(+) visits  Additional findings: Refill needed today       PLAN     Recommended plan for temporary change(s) affecting INR     Dosing Instructions: Continue your current warfarin dose with next INR in 1 week       Summary  As of 3/15/2022    Full warfarin instructions:  4 mg every Sun, Thu; 5 mg all other days   Next INR check:  3/21/2022             Telephone call with Edson who verbalizes understanding and agrees to plan and who agrees to plan and repeated back plan correctly    Check at provider office visit    Education provided: Goal range and significance of current result, Importance of taking warfarin as instructed and Contact [phone] with any changes, questions or concerns.     Plan made per [facility] anticoagulation protocol    ESSIE COLÓN RN  Anticoagulation Clinic  3/15/2022    _______________________________________________________________________     Anticoagulation Episode Summary     Current INR goal:  2.0-3.0   TTR:  52.1 % (2.2 mo)   Target end date:  Indefinite   Send INR reminders to:  [facility]    Indications    Atrial fibrillation  unspecified type (H) [I48.91]           Comments:  [facility] P: [phone]  Transplant Labs twice a week  Staying Local: [place]ah[place] Gabriel then back to  [place]         Anticoagulation Care Providers     Provider Role Specialty Phone number    Abiodun Woods MD Referring Pulmonary Disease [phone]

## 2022-03-15 NOTE — NURSING NOTE
Chief Complaint   Patient presents with     RECHECK     2 week lung tx follow up      Blood pressure (!) 156/99, pulse (!) 134, temperature 98.9  F (37.2  C), weight 71.2 kg (157 lb), SpO2 97 %.    Autumn Almonte, CMA

## 2022-03-15 NOTE — PROGRESS NOTES
Reason for Visit  Edson Thornton is a 56 year old year old male who is being seen for RECHECK (2 week lung tx follow up )      Assessment and plan: ***    Lung TX HPI  Transplants:  10/16/2021 (Lung), Postoperative day:  150    The patient was seen and examined by Abiodun Woods MD     A complete ROS was otherwise negative except as noted in the HPI.    Current Outpatient Medications   Medication     amLODIPine (NORVASC) 5 MG tablet     diclofenac (VOLTAREN) 1 % topical gel     metoprolol tartrate (LOPRESSOR) 25 MG tablet     senna-docusate (SENOKOT-S/PERICOLACE) 8.6-50 MG tablet     acetaminophen (TYLENOL) 325 MG tablet     calcium carbonate 600 mg-vitamin D 400 units (CALTRATE) 600-400 MG-UNIT per tablet     escitalopram (LEXAPRO) 20 MG tablet     fluticasone (FLONASE) 50 MCG/ACT nasal spray     insulin glargine (LANTUS PEN) 100 UNIT/ML pen     levalbuterol (XOPENEX HFA) 45 MCG/ACT inhaler     levothyroxine (SYNTHROID/LEVOTHROID) 25 MCG tablet     magnesium oxide (MAG-OX) 400 MG tablet     Melatonin 10 MG TABS tablet     multivitamin w/minerals (THERA-VIT-M) tablet     mycophenolate (GENERIC EQUIVALENT) 250 MG capsule     nystatin (MYCOSTATIN) 482400 UNIT/ML suspension     ondansetron (ZOFRAN-ODT) 4 MG ODT tab     pantoprazole (PROTONIX) 40 MG EC tablet     predniSONE (DELTASONE) 5 MG tablet     rosuvastatin (CRESTOR) 10 MG tablet     sulfamethoxazole-trimethoprim (BACTRIM) 400-80 MG tablet     tacrolimus (GENERIC EQUIVALENT) 0.5 MG capsule     tacrolimus (GENERIC EQUIVALENT) 1 MG capsule     warfarin ANTICOAGULANT (COUMADIN) 1 MG tablet     warfarin ANTICOAGULANT (COUMADIN) 2 MG tablet     No current facility-administered medications for this visit.     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     Primary hypertension 2/28/2022     Rheumatoid arthritis (H)     signs ~5/2020, dx 5/2021     S/P lung transplant (H) 10/16/2021     Shock liver 10/17/2021     Steroid-induced hyperglycemia      Traction bronchiectasis (H)        Past Surgical History:   Procedure Laterality Date     BRONCHOSCOPY (RIGID OR FLEXIBLE), DIAGNOSTIC N/A 1/26/2022    Procedure: BRONCHOSCOPY, WITH BRONCHOALVEOLAR LAVAGE AND BIOPSY;  Surgeon: Perlman, David Morris, MD;  Location:  GI     COLONOSCOPY W/ BIOPSIES AND POLYPECTOMY  07/21/2020     CV CORONARY ANGIOGRAM N/A 09/08/2021    Procedure: Coronary Angiogram with possible intervention;  Surgeon: Jovon Bullock MD;  Location:  HEART CARDIAC CATH LAB     CV RIGHT HEART CATH MEASUREMENTS RECORDED N/A 09/08/2021    Procedure: Right Heart Cath;  Surgeon: Jovon Bullock MD;  Location:  HEART CARDIAC CATH LAB     ESOPHAGOSCOPY, GASTROSCOPY, DUODENOSCOPY (EGD), COMBINED N/A 10/23/2021    Procedure: ESOPHAGOGASTRODUODENOSCOPY (EGD);  Surgeon: Miquel Pisano MD;  Location:  GI     ESOPHAGOSCOPY, GASTROSCOPY, DUODENOSCOPY (EGD), COMBINED N/A 11/02/2021    Procedure: ESOPHAGOGASTRODUODENOSCOPY (EGD);  Surgeon: Daniel Ortiz MD;  Location:  GI     ESOPHAGOSCOPY, GASTROSCOPY, DUODENOSCOPY (EGD), COMBINED N/A 11/5/2021    Procedure: ESOPHAGOGASTRODUODENOSCOPY (EGD);  Surgeon: Ronnell Hernandez MD;  Location:  GI     EXTRACTION(S) DENTAL N/A 09/22/2021    Procedure: EXTRACTION tooth #19;  Surgeon: Deepak Tobin DDS;  Location:  OR     HERNIA REPAIR       IR CHEST TUBE PLACEMENT NON-TUNNELLED LEFT  11/03/2021     PICC TRIPLE LUMEN PLACEMENT Left 11/04/2021    5FR TL PICC. Right non occlusive thrombus subclavian vein.     REPLACE GASTROJEJUNOSTOMY TUBE, PERCUTANEOUS N/A 11/9/2021    Procedure: REPLACEMENT, GASTROJEJUNOSTOMY TUBE, PERCUTANEOUS;  Surgeon: Hernando Rodriguez MD;  Location:  GI      right acl       TRACHEOSTOMY PERCUTANEOUS N/A 10/29/2021    Procedure: Percutaneous Tracheostomy,;  Surgeon: Celine Jenkins MD;  Location: [facility] OR     TRANSPLANT LUNG RECIPIENT SINGLE X2 Bilateral 10/16/2021    Procedure: TRANSPLANT, LUNG, RECIPIENT, BILATERAL, Bronchoscopy, on-pump perfusion, bilateral clamshell sternotomy;  Surgeon: Yanick Corral MD;  Location: [facility] OR     XR ACROMIOCLAVICULAR JOINT BILATERAL         Social History     Socioeconomic History     Marital status: Single     Spouse name: Not on file     Number of children: Not on file     Years of education: Not on file     Highest education level: Not on file   Occupational History     Not on file   Tobacco Use     Smoking status: Never Smoker     Smokeless tobacco: Never Used   Substance and Sexual Activity     Alcohol use: Never     Drug use: Never     Sexual activity: Not on file   Other Topics Concern     Parent/sibling w/ CABG, MI or angioplasty before 65F 55M? Not Asked   Social History Narrative     Not on file     Social Determinants of Health     Financial Resource Strain: Not on file   Food Insecurity: Not on file   Transportation Needs: Not on file   Physical Activity: Not on file   Stress: Not on file   Social Connections: Not on file   Intimate Partner Violence: Not on file   Housing Stability: Not on file         BP (!) 159/102 (BP Location: Right arm, Patient Position: Sitting, Cuff Size: Adult Regular)   Pulse 120   Temp 98.9  F (37.2  C)   Wt 71.2 kg (157 lb)   SpO2 97%   BMI 26.95 kg/m    Body mass index is 26.95 kg/m .  Exam:   GENERAL APPEARANCE: Well developed, well nourished, alert, and in no apparent distress.  EYES: PERRL, EOMI  HENT: Nasal mucosa with edema and no hyperemia. No nasal polyps.  EARS: Canals clear, TMs normal  MOUTH: Oral mucosa is moist, without any lesions, no tonsillar enlargement, no oropharyngeal exudate.  NECK: supple, no masses, no thyromegaly.  LYMPHATICS: No significant axillary,  cervical, or supraclavicular nodes.  RESP: normal percussion, good air flow throughout the left, decreased at the right base.  No crackles. No rhonchi. No wheezes.  CV: Normal S1, S2, regular rhythm, tachycardia. No murmur.  No rub. No gallop. No LE edema.   ABDOMEN:  Bowel sounds normal, soft, lower abd tenderness, no HSM or masses.   MS: extremities normal. (+) clubbing. No cyanosis.  SKIN: no rash on limited exam  NEURO: Mentation intact, speech normal, normal strength and tone, normal gait and stance  PSYCH: mentation appears normal. and affect normal/bright  Results:  Recent Results (from the past 168 hour(s))   Basic metabolic panel    Collection Time: 03/15/22  8:03 AM   Result Value Ref Range    Sodium 144 133 - 144 mmol/L    Potassium 3.6 3.4 - 5.3 mmol/L    Chloride 108 94 - 109 mmol/L    Carbon Dioxide (CO2) 25 20 - 32 mmol/L    Anion Gap 11 3 - 14 mmol/L    Urea Nitrogen 22 7 - 30 mg/dL    Creatinine 1.18 0.66 - 1.25 mg/dL    Calcium 9.2 8.5 - 10.1 mg/dL    Glucose 105 (H) 70 - 99 mg/dL    GFR Estimate 72 >60 mL/min/1.73m2   Magnesium    Collection Time: 03/15/22  8:03 AM   Result Value Ref Range    Magnesium 1.2 (L) 1.6 - 2.3 mg/dL   CBC with platelets    Collection Time: 03/15/22  8:03 AM   Result Value Ref Range    WBC Count 9.7 4.0 - 11.0 10e3/uL    RBC Count 4.58 4.40 - 5.90 10e6/uL    Hemoglobin 12.3 (L) 13.3 - 17.7 g/dL    Hematocrit 38.1 (L) 40.0 - 53.0 %    MCV 83 78 - 100 fL    MCH 26.9 26.5 - 33.0 pg    MCHC 32.3 31.5 - 36.5 g/dL    RDW 17.0 (H) 10.0 - 15.0 %    Platelet Count 187 150 - 450 10e3/uL   INR    Collection Time: 03/15/22  8:03 AM   Result Value Ref Range    INR 2.11 (H) 0.85 - 1.15   General PFT Lab (Please always keep checked)    Collection Time: 03/15/22  8:21 AM   Result Value Ref Range    FVC-Pred 3.98 L    FVC-Pre 2.15 L    FVC-%Pred-Pre 54 %    FEV1-Pre 1.37 L    FEV1-%Pred-Pre 43 %    FEV1FVC-Pred 79 %    FEV1FVC-Pre 64 %    FEFMax-Pred 8.37 L/sec    FEFMax-Pre 4.58 L/sec     FEFMax-%Pred-Pre 54 %    FEF2575-Pred 2.83 L/sec    FEF2575-Pre 0.71 L/sec    GBT8180-%Pred-Pre 25 %    ExpTime-Pre 8.77 sec    FIFMax-Pre 5.10 L/sec    FEV1FEV6-Pred 80 %    FEV1FEV6-Pre 64 %                         Results as noted above.    PFT Interpretation:  {Chuck PFT interpretation:406731}  {Increase/decrease:965739}  {JDPFT:708473}  Valid Maneuver

## 2022-03-15 NOTE — PROGRESS NOTES
Infusion Nursing Note:  Edson Thornton presents today for Magnesium.    Patient seen by provider today: Yes. Cardiac Rehab.    present during visit today: Not Applicable.    Note: N/A.      Intravenous Access:  Peripheral IV placed left lower forearm.    Treatment Conditions:  Not Applicable.      Post Infusion Assessment:  Patient tolerated infusion without incident.  Site patent and intact, free from redness, edema or discomfort.  No evidence of extravasations.  Access discontinued per protocol.       Discharge Plan:   Discharge instructions reviewed with: Patient.  Patient and/or family verbalized understanding of discharge instructions and all questions answered.  Patient discharged in stable condition accompanied by: self.  Departure Mode: Ambulatory.      Altagracia Knight RN

## 2022-03-16 ENCOUNTER — TELEPHONE (OUTPATIENT)
Dept: TRANSPLANT | Facility: CLINIC | Age: 57
End: 2022-03-16
Payer: COMMERCIAL

## 2022-03-16 ENCOUNTER — LAB (OUTPATIENT)
Dept: LAB | Facility: CLINIC | Age: 57
End: 2022-03-16
Payer: COMMERCIAL

## 2022-03-16 ENCOUNTER — TELEPHONE (OUTPATIENT)
Dept: TRANSPLANT | Facility: CLINIC | Age: 57
End: 2022-03-16

## 2022-03-16 DIAGNOSIS — R00.0 TACHYCARDIA: ICD-10-CM

## 2022-03-16 DIAGNOSIS — Z79.899 ENCOUNTER FOR LONG-TERM (CURRENT) USE OF HIGH-RISK MEDICATION: ICD-10-CM

## 2022-03-16 DIAGNOSIS — K59.01 SLOW TRANSIT CONSTIPATION: ICD-10-CM

## 2022-03-16 DIAGNOSIS — D84.9 IMMUNOSUPPRESSED STATUS (H): ICD-10-CM

## 2022-03-16 DIAGNOSIS — E83.42 HYPOMAGNESEMIA: Primary | ICD-10-CM

## 2022-03-16 DIAGNOSIS — Z94.2 S/P LUNG TRANSPLANT (H): Primary | ICD-10-CM

## 2022-03-16 DIAGNOSIS — M06.9 RHEUMATOID ARTHRITIS INVOLVING BOTH HANDS, UNSPECIFIED WHETHER RHEUMATOID FACTOR PRESENT (H): ICD-10-CM

## 2022-03-16 DIAGNOSIS — Z94.2 S/P LUNG TRANSPLANT (H): ICD-10-CM

## 2022-03-16 DIAGNOSIS — I10 BENIGN ESSENTIAL HYPERTENSION: ICD-10-CM

## 2022-03-16 DIAGNOSIS — E83.42 HYPOMAGNESEMIA: ICD-10-CM

## 2022-03-16 LAB
ANION GAP SERPL CALCULATED.3IONS-SCNC: 9 MMOL/L (ref 3–14)
BUN SERPL-MCNC: 16 MG/DL (ref 7–30)
CALCIUM SERPL-MCNC: 9.2 MG/DL (ref 8.5–10.1)
CHLORIDE BLD-SCNC: 105 MMOL/L (ref 94–109)
CMV DNA SPEC NAA+PROBE-ACNC: NOT DETECTED IU/ML
CO2 SERPL-SCNC: 28 MMOL/L (ref 20–32)
CREAT SERPL-MCNC: 1.19 MG/DL (ref 0.66–1.25)
EBV DNA # SPEC NAA+PROBE: NOT DETECTED COPIES/ML
ERYTHROCYTE [DISTWIDTH] IN BLOOD BY AUTOMATED COUNT: 17.1 % (ref 10–15)
GFR SERPL CREATININE-BSD FRML MDRD: 72 ML/MIN/1.73M2
GLUCOSE BLD-MCNC: 122 MG/DL (ref 70–99)
HCT VFR BLD AUTO: 38 % (ref 40–53)
HGB BLD-MCNC: 12.1 G/DL (ref 13.3–17.7)
INR PPP: 2.51 (ref 0.85–1.15)
MAGNESIUM SERPL-MCNC: 1.9 MG/DL (ref 1.6–2.3)
MCH RBC QN AUTO: 27.1 PG (ref 26.5–33)
MCHC RBC AUTO-ENTMCNC: 31.8 G/DL (ref 31.5–36.5)
MCV RBC AUTO: 85 FL (ref 78–100)
PLATELET # BLD AUTO: 188 10E3/UL (ref 150–450)
POTASSIUM BLD-SCNC: 4.3 MMOL/L (ref 3.4–5.3)
RBC # BLD AUTO: 4.46 10E6/UL (ref 4.4–5.9)
SODIUM SERPL-SCNC: 142 MMOL/L (ref 133–144)
TACROLIMUS BLD-MCNC: 43 UG/L (ref 5–15)
TME LAST DOSE: ABNORMAL H
TME LAST DOSE: ABNORMAL H
WBC # BLD AUTO: 9.2 10E3/UL (ref 4–11)

## 2022-03-16 PROCEDURE — 36415 COLL VENOUS BLD VENIPUNCTURE: CPT | Performed by: PATHOLOGY

## 2022-03-16 PROCEDURE — 80197 ASSAY OF TACROLIMUS: CPT | Mod: 90 | Performed by: PATHOLOGY

## 2022-03-16 PROCEDURE — 85027 COMPLETE CBC AUTOMATED: CPT | Performed by: PATHOLOGY

## 2022-03-16 PROCEDURE — 85610 PROTHROMBIN TIME: CPT | Performed by: PATHOLOGY

## 2022-03-16 PROCEDURE — 99000 SPECIMEN HANDLING OFFICE-LAB: CPT | Performed by: PATHOLOGY

## 2022-03-16 PROCEDURE — 80048 BASIC METABOLIC PNL TOTAL CA: CPT | Performed by: PATHOLOGY

## 2022-03-16 PROCEDURE — 83735 ASSAY OF MAGNESIUM: CPT | Performed by: PATHOLOGY

## 2022-03-16 RX ORDER — TACROLIMUS 1 MG/1
3 CAPSULE ORAL 2 TIMES DAILY
Qty: 180 CAPSULE | Refills: 11 | Status: SHIPPED | OUTPATIENT
Start: 2022-03-16 | End: 2022-03-24

## 2022-03-16 NOTE — TELEPHONE ENCOUNTER
Patient calls to inquire if need labs today.   Patient Mg yesterday 1.2, received 4g IV Mg.     Appointment made for Mg recheck this AM.   Patient will call back with questions, concerns, updates.

## 2022-03-16 NOTE — PROGRESS NOTES
INR noted today-2.51. INR drawn with other labs. No changes made, next INR scheduled 3/21/22.     ESSIE COLÓN RN-BC, [facility]  Anticoagulation Clinic  [phone]

## 2022-03-16 NOTE — TELEPHONE ENCOUNTER
On-call coordinator note    Paged by lab for critical tacrolimus level of 43. Contacted Bret; he reports he took his medications including tacrolimus approximately 1-2 hours before this morning's labs as this draw was not intended to include tacrolimus recheck. Reviewed dose change from yesterday's level per primary coordinator Mady's note. Bret confirms he is feeling well. Encouraged him to maintain current dosing schedule. He verbalized agreement and had no questions at this time; understands he can page with any concerns. Discussed with on-call mentor.

## 2022-03-16 NOTE — TELEPHONE ENCOUNTER
Tacrolimus level 14.1 at 12 hours, on 3/15/2022.  Goal 8-12.   Current dose 3.5 mg in AM, 3.5 mg in PM    Dose changed to 3 mg in AM, 3 mg in PM   Recheck level in 3-5    Discussed with patient   MyChart message sent

## 2022-03-18 ENCOUNTER — HOSPITAL ENCOUNTER (OUTPATIENT)
Dept: CARDIAC REHAB | Facility: CLINIC | Age: 57
Discharge: HOME | End: 2022-03-18
Attending: INTERNAL MEDICINE
Payer: COMMERCIAL

## 2022-03-18 ENCOUNTER — TELEPHONE (OUTPATIENT)
Dept: TRANSPLANT | Facility: CLINIC | Age: 57
End: 2022-03-18
Payer: COMMERCIAL

## 2022-03-18 PROCEDURE — G0239 OTH RESP PROC, GROUP: HCPCS

## 2022-03-18 NOTE — TELEPHONE ENCOUNTER
Patient calls this AM to review BPs for the past few days:    Tuesday /94  Tuesday /93  Wednesday AM: 159/107  Wednesday PM: 141/87  Thursday AM: 156/104  Thursday PM: 151/87  Friday AM: 152/92    HR 80-90s    Patient started on metoprolol 12.5mg BID on Tuesday.   Discussed with Dr. Woods, instructed patient to increase metoprolol to 25mg BID. Continue monitor BP/HR. Instructed patient to notify coordinator if BP consistently <90/60, HR <60, or if getting lightheaded/dizziness/shortness of breath.      Patient verbalized understanding and agreement of plan. Will call back with questions, concerns, updates.

## 2022-03-21 ENCOUNTER — ANCILLARY PROCEDURE (OUTPATIENT)
Dept: GENERAL RADIOLOGY | Facility: CLINIC | Age: 57
End: 2022-03-21
Attending: INTERNAL MEDICINE
Payer: COMMERCIAL

## 2022-03-21 ENCOUNTER — MEDICAL CORRESPONDENCE (OUTPATIENT)
Dept: HEALTH INFORMATION MANAGEMENT | Facility: CLINIC | Age: 57
End: 2022-03-21

## 2022-03-21 ENCOUNTER — TELEPHONE (OUTPATIENT)
Dept: TRANSPLANT | Facility: CLINIC | Age: 57
End: 2022-03-21

## 2022-03-21 ENCOUNTER — OFFICE VISIT (OUTPATIENT)
Dept: TRANSPLANT | Facility: CLINIC | Age: 57
End: 2022-03-21
Attending: INTERNAL MEDICINE
Payer: COMMERCIAL

## 2022-03-21 ENCOUNTER — LAB (OUTPATIENT)
Dept: LAB | Facility: CLINIC | Age: 57
End: 2022-03-21
Attending: INTERNAL MEDICINE
Payer: COMMERCIAL

## 2022-03-21 VITALS
BODY MASS INDEX: 27.41 KG/M2 | OXYGEN SATURATION: 93 % | DIASTOLIC BLOOD PRESSURE: 96 MMHG | HEART RATE: 78 BPM | WEIGHT: 159.7 LBS | TEMPERATURE: 98.1 F | SYSTOLIC BLOOD PRESSURE: 148 MMHG

## 2022-03-21 DIAGNOSIS — D84.9 IMMUNOSUPPRESSED STATUS (H): ICD-10-CM

## 2022-03-21 DIAGNOSIS — Z94.2 S/P LUNG TRANSPLANT (H): ICD-10-CM

## 2022-03-21 DIAGNOSIS — Z79.899 ENCOUNTER FOR LONG-TERM (CURRENT) USE OF HIGH-RISK MEDICATION: ICD-10-CM

## 2022-03-21 DIAGNOSIS — R00.0 TACHYCARDIA: ICD-10-CM

## 2022-03-21 DIAGNOSIS — M06.9 RHEUMATOID ARTHRITIS INVOLVING BOTH HANDS, UNSPECIFIED WHETHER RHEUMATOID FACTOR PRESENT (H): ICD-10-CM

## 2022-03-21 DIAGNOSIS — Z94.2 S/P LUNG TRANSPLANT (H): Primary | ICD-10-CM

## 2022-03-21 DIAGNOSIS — I10 BENIGN ESSENTIAL HYPERTENSION: ICD-10-CM

## 2022-03-21 DIAGNOSIS — Z94.2 S/P LUNG TRANSPLANT (H): Chronic | ICD-10-CM

## 2022-03-21 DIAGNOSIS — K59.01 SLOW TRANSIT CONSTIPATION: ICD-10-CM

## 2022-03-21 DIAGNOSIS — E83.42 HYPOMAGNESEMIA: ICD-10-CM

## 2022-03-21 LAB
ANION GAP SERPL CALCULATED.3IONS-SCNC: 8 MMOL/L (ref 3–14)
BUN SERPL-MCNC: 21 MG/DL (ref 7–30)
CALCIUM SERPL-MCNC: 9.6 MG/DL (ref 8.5–10.1)
CHLORIDE BLD-SCNC: 105 MMOL/L (ref 94–109)
CO2 SERPL-SCNC: 29 MMOL/L (ref 20–32)
CREAT SERPL-MCNC: 1.1 MG/DL (ref 0.66–1.25)
ERYTHROCYTE [DISTWIDTH] IN BLOOD BY AUTOMATED COUNT: 16.2 % (ref 10–15)
EXPTIME-PRE: 7.58 SEC
FEF2575-%PRED-PRE: 22 %
FEF2575-PRE: 0.64 L/SEC
FEF2575-PRED: 2.83 L/SEC
FEFMAX-%PRED-PRE: 53 %
FEFMAX-PRE: 4.47 L/SEC
FEFMAX-PRED: 8.37 L/SEC
FEV1-%PRED-PRE: 40 %
FEV1-PRE: 1.26 L
FEV1FEV6-PRE: 63 %
FEV1FEV6-PRED: 80 %
FEV1FVC-PRE: 63 %
FEV1FVC-PRED: 79 %
FIFMAX-PRE: 3.96 L/SEC
FVC-%PRED-PRE: 50 %
FVC-PRE: 2 L
FVC-PRED: 3.98 L
GFR SERPL CREATININE-BSD FRML MDRD: 79 ML/MIN/1.73M2
GLUCOSE BLD-MCNC: 117 MG/DL (ref 70–99)
HCT VFR BLD AUTO: 38.7 % (ref 40–53)
HGB BLD-MCNC: 12.5 G/DL (ref 13.3–17.7)
MAGNESIUM SERPL-MCNC: 1.5 MG/DL (ref 1.6–2.3)
MCH RBC QN AUTO: 27.4 PG (ref 26.5–33)
MCHC RBC AUTO-ENTMCNC: 32.3 G/DL (ref 31.5–36.5)
MCV RBC AUTO: 85 FL (ref 78–100)
PLATELET # BLD AUTO: 217 10E3/UL (ref 150–450)
POTASSIUM BLD-SCNC: 4.1 MMOL/L (ref 3.4–5.3)
RBC # BLD AUTO: 4.56 10E6/UL (ref 4.4–5.9)
SODIUM SERPL-SCNC: 142 MMOL/L (ref 133–144)
TACROLIMUS BLD-MCNC: 8.8 UG/L (ref 5–15)
TME LAST DOSE: NORMAL H
TME LAST DOSE: NORMAL H
WBC # BLD AUTO: 8.2 10E3/UL (ref 4–11)

## 2022-03-21 PROCEDURE — 71046 X-RAY EXAM CHEST 2 VIEWS: CPT | Performed by: RADIOLOGY

## 2022-03-21 PROCEDURE — 99207 PR RESPIRATORY FLOW VOLUME LOOP: CPT

## 2022-03-21 PROCEDURE — 83735 ASSAY OF MAGNESIUM: CPT | Performed by: PATHOLOGY

## 2022-03-21 PROCEDURE — 80197 ASSAY OF TACROLIMUS: CPT | Mod: 90 | Performed by: PATHOLOGY

## 2022-03-21 PROCEDURE — 99000 SPECIMEN HANDLING OFFICE-LAB: CPT | Performed by: PATHOLOGY

## 2022-03-21 PROCEDURE — 86833 HLA CLASS II HIGH DEFIN QUAL: CPT | Performed by: PATHOLOGY

## 2022-03-21 PROCEDURE — 85027 COMPLETE CBC AUTOMATED: CPT | Performed by: PATHOLOGY

## 2022-03-21 PROCEDURE — 86832 HLA CLASS I HIGH DEFIN QUAL: CPT | Performed by: PATHOLOGY

## 2022-03-21 PROCEDURE — 36415 COLL VENOUS BLD VENIPUNCTURE: CPT | Performed by: PATHOLOGY

## 2022-03-21 PROCEDURE — 80048 BASIC METABOLIC PNL TOTAL CA: CPT | Performed by: PATHOLOGY

## 2022-03-21 RX ORDER — MULTIPLE VITAMINS W/ MINERALS TAB 9MG-400MCG
1 TAB ORAL DAILY
Qty: 30 TABLET | Refills: 11 | COMMUNITY
Start: 2022-03-21 | End: 2022-03-24

## 2022-03-21 RX ORDER — METOPROLOL TARTRATE 25 MG/1
25 TABLET, FILM COATED ORAL 2 TIMES DAILY
Qty: 60 TABLET | Refills: 11 | COMMUNITY
Start: 2022-03-21 | End: 2022-03-21

## 2022-03-21 RX ORDER — MYCOPHENOLIC ACID 360 MG/1
720 TABLET, DELAYED RELEASE ORAL 2 TIMES DAILY
Qty: 120 TABLET | Refills: 11 | Status: SHIPPED | OUTPATIENT
Start: 2022-03-21 | End: 2022-03-24

## 2022-03-21 RX ORDER — METOPROLOL TARTRATE 25 MG/1
50 TABLET, FILM COATED ORAL 2 TIMES DAILY
Qty: 120 TABLET | Refills: 11 | Status: SHIPPED | OUTPATIENT
Start: 2022-03-21 | End: 2022-03-24

## 2022-03-21 ASSESSMENT — PAIN SCALES - GENERAL: PAINLEVEL: NO PAIN (0)

## 2022-03-21 NOTE — PROGRESS NOTES
Reason for Visit  Edson Thornton is a 56 year old year old male who is being seen for No chief complaint on file.      Assessment and plan: ***    Lung TX HPI  Transplants:  10/16/2021 (Lung), Postoperative day:  156    The patient was seen and examined by Abiodun Woods MD     A complete ROS was otherwise negative except as noted in the HPI.    Current Outpatient Medications   Medication     acetaminophen (TYLENOL) 325 MG tablet     amLODIPine (NORVASC) 5 MG tablet     calcium carbonate 600 mg-vitamin D 400 units (CALTRATE) 600-400 MG-UNIT per tablet     diclofenac (VOLTAREN) 1 % topical gel     escitalopram (LEXAPRO) 20 MG tablet     fluticasone (FLONASE) 50 MCG/ACT nasal spray     insulin glargine (LANTUS PEN) 100 UNIT/ML pen     levalbuterol (XOPENEX HFA) 45 MCG/ACT inhaler     levothyroxine (SYNTHROID/LEVOTHROID) 25 MCG tablet     magnesium oxide (MAG-OX) 400 MG tablet     Melatonin 10 MG TABS tablet     metoprolol tartrate (LOPRESSOR) 25 MG tablet     multivitamin w/minerals (THERA-VIT-M) tablet     mycophenolate (GENERIC EQUIVALENT) 250 MG capsule     nystatin (MYCOSTATIN) 521512 UNIT/ML suspension     ondansetron (ZOFRAN-ODT) 4 MG ODT tab     pantoprazole (PROTONIX) 40 MG EC tablet     predniSONE (DELTASONE) 5 MG tablet     rosuvastatin (CRESTOR) 10 MG tablet     senna-docusate (SENOKOT-S/PERICOLACE) 8.6-50 MG tablet     sulfamethoxazole-trimethoprim (BACTRIM) 400-80 MG tablet     tacrolimus (GENERIC EQUIVALENT) 1 MG capsule     warfarin ANTICOAGULANT (COUMADIN) 1 MG tablet     warfarin ANTICOAGULANT (COUMADIN) 1 MG tablet     warfarin ANTICOAGULANT (COUMADIN) 2 MG tablet     No current facility-administered medications for this visit.     No Known Allergies  Past Medical History:   Diagnosis Date     BRENNAN (acute kidney injury) (H) 10/17/2021     Anxiety      Depression      HLD (hyperlipidemia)      HTN (hypertension)      Hypothyroidism      ILD (interstitial lung disease) (H)      SALTY on CPAP       Oxygen dependent     BL 4L since ~6/2021     Primary hypertension 2/28/2022     Rheumatoid arthritis (H)     signs ~5/2020, dx 5/2021     S/P lung transplant (H) 10/16/2021     Shock liver 10/17/2021     Steroid-induced hyperglycemia      Traction bronchiectasis (H)        Past Surgical History:   Procedure Laterality Date     BRONCHOSCOPY (RIGID OR FLEXIBLE), DIAGNOSTIC N/A 1/26/2022    Procedure: BRONCHOSCOPY, WITH BRONCHOALVEOLAR LAVAGE AND BIOPSY;  Surgeon: Perlman, David Morris, MD;  Location:  GI     COLONOSCOPY W/ BIOPSIES AND POLYPECTOMY  07/21/2020     CV CORONARY ANGIOGRAM N/A 09/08/2021    Procedure: Coronary Angiogram with possible intervention;  Surgeon: Jovon Bullock MD;  Location:  HEART CARDIAC CATH LAB     CV RIGHT HEART CATH MEASUREMENTS RECORDED N/A 09/08/2021    Procedure: Right Heart Cath;  Surgeon: Jovon Bullock MD;  Location:  HEART CARDIAC CATH LAB     ESOPHAGOSCOPY, GASTROSCOPY, DUODENOSCOPY (EGD), COMBINED N/A 10/23/2021    Procedure: ESOPHAGOGASTRODUODENOSCOPY (EGD);  Surgeon: Miquel Pisano MD;  Location:  GI     ESOPHAGOSCOPY, GASTROSCOPY, DUODENOSCOPY (EGD), COMBINED N/A 11/02/2021    Procedure: ESOPHAGOGASTRODUODENOSCOPY (EGD);  Surgeon: Daniel Ortiz MD;  Location:  GI     ESOPHAGOSCOPY, GASTROSCOPY, DUODENOSCOPY (EGD), COMBINED N/A 11/5/2021    Procedure: ESOPHAGOGASTRODUODENOSCOPY (EGD);  Surgeon: Ronnell Hernandez MD;  Location:  GI     EXTRACTION(S) DENTAL N/A 09/22/2021    Procedure: EXTRACTION tooth #19;  Surgeon: Deepak Tobin DDS;  Location:  OR     HERNIA REPAIR       IR CHEST TUBE PLACEMENT NON-TUNNELLED LEFT  11/03/2021     PICC TRIPLE LUMEN PLACEMENT Left 11/04/2021    5FR TL PICC. Right non occlusive thrombus subclavian vein.     REPLACE GASTROJEJUNOSTOMY TUBE, PERCUTANEOUS N/A 11/9/2021    Procedure: REPLACEMENT, GASTROJEJUNOSTOMY TUBE, PERCUTANEOUS;  Surgeon: Hernando Rodriguez MD;  Location:  GI     right acl        TRACHEOSTOMY PERCUTANEOUS N/A 10/29/2021    Procedure: Percutaneous Tracheostomy,;  Surgeon: Celine Jenkins MD;  Location: [facility] OR     TRANSPLANT LUNG RECIPIENT SINGLE X2 Bilateral 10/16/2021    Procedure: TRANSPLANT, LUNG, RECIPIENT, BILATERAL, Bronchoscopy, on-pump perfusion, bilateral clamshell sternotomy;  Surgeon: Yanick Corral MD;  Location: [facility] OR     XR ACROMIOCLAVICULAR JOINT BILATERAL         Social History     Socioeconomic History     Marital status: Single     Spouse name: Not on file     Number of children: Not on file     Years of education: Not on file     Highest education level: Not on file   Occupational History     Not on file   Tobacco Use     Smoking status: Never Smoker     Smokeless tobacco: Never Used   Substance and Sexual Activity     Alcohol use: Never     Drug use: Never     Sexual activity: Not on file   Other Topics Concern     Parent/sibling w/ CABG, MI or angioplasty before 65F 55M? Not Asked   Social History Narrative     Not on file     Social Determinants of Health     Financial Resource Strain: Not on file   Food Insecurity: Not on file   Transportation Needs: Not on file   Physical Activity: Not on file   Stress: Not on file   Social Connections: Not on file   Intimate Partner Violence: Not on file   Housing Stability: Not on file         There were no vitals taken for this visit.  There is no height or weight on file to calculate BMI.  Exam:   GENERAL APPEARANCE: Well developed, well nourished, alert, and in no apparent distress.  EYES: PERRL, EOMI  HENT: Nasal mucosa with no edema and no hyperemia. No nasal polyps.  EARS: Canals clear, TMs normal  MOUTH: Oral mucosa is moist, without any lesions, no tonsillar enlargement, no oropharyngeal exudate.  NECK: supple, no masses, no thyromegaly.  LYMPHATICS: No significant axillary, cervical, or supraclavicular nodes.  RESP: normal percussion, good air flow throughout.  No crackles. No rhonchi. No wheezes.  CV:  Normal S1, S2, regular rhythm, normal rate. No murmur.  No rub. No gallop. No LE edema.   ABDOMEN:  Bowel sounds normal, soft, nontender, no HSM or masses.   MS: extremities normal. No clubbing. No cyanosis.  SKIN: no rash on limited exam  NEURO: Mentation intact, speech normal, normal strength and tone, normal gait and stance  PSYCH: mentation appears normal. and affect normal/bright  Results:  Recent Results (from the past 168 hour(s))   Basic metabolic panel    Collection Time: 03/15/22  8:03 AM   Result Value Ref Range    Sodium 144 133 - 144 mmol/L    Potassium 3.6 3.4 - 5.3 mmol/L    Chloride 108 94 - 109 mmol/L    Carbon Dioxide (CO2) 25 20 - 32 mmol/L    Anion Gap 11 3 - 14 mmol/L    Urea Nitrogen 22 7 - 30 mg/dL    Creatinine 1.18 0.66 - 1.25 mg/dL    Calcium 9.2 8.5 - 10.1 mg/dL    Glucose 105 (H) 70 - 99 mg/dL    GFR Estimate 72 >60 mL/min/1.73m2   Magnesium    Collection Time: 03/15/22  8:03 AM   Result Value Ref Range    Magnesium 1.2 (L) 1.6 - 2.3 mg/dL   CBC with platelets    Collection Time: 03/15/22  8:03 AM   Result Value Ref Range    WBC Count 9.7 4.0 - 11.0 10e3/uL    RBC Count 4.58 4.40 - 5.90 10e6/uL    Hemoglobin 12.3 (L) 13.3 - 17.7 g/dL    Hematocrit 38.1 (L) 40.0 - 53.0 %    MCV 83 78 - 100 fL    MCH 26.9 26.5 - 33.0 pg    MCHC 32.3 31.5 - 36.5 g/dL    RDW 17.0 (H) 10.0 - 15.0 %    Platelet Count 187 150 - 450 10e3/uL   CMV DNA quantification    Collection Time: 03/15/22  8:03 AM    Specimen: Arm, Left; Blood   Result Value Ref Range    CMV DNA IU/mL Not Detected Not Detected IU/mL   EBV DNA PCR Quantitative Whole Blood    Collection Time: 03/15/22  8:03 AM   Result Value Ref Range    EBV DNA Copies/mL Not Detected Not Detected copies/mL   INR    Collection Time: 03/15/22  8:03 AM   Result Value Ref Range    INR 2.11 (H) 0.85 - 1.15   Tacrolimus level    Collection Time: 03/15/22  8:03 AM   Result Value Ref Range    Tacrolimus by Tandem Mass Spectrometry 14.1 5.0 - 15.0 ug/L    Tacrolimus  Last Dose Date 3/14/2022     Tacrolimus Last Dose Time  8:00 PM    General PFT Lab (Please always keep checked)    Collection Time: 03/15/22  8:21 AM   Result Value Ref Range    FVC-Pred 3.98 L    FVC-Pre 2.15 L    FVC-%Pred-Pre 54 %    FEV1-Pre 1.37 L    FEV1-%Pred-Pre 43 %    FEV1FVC-Pred 79 %    FEV1FVC-Pre 64 %    FEFMax-Pred 8.37 L/sec    FEFMax-Pre 4.58 L/sec    FEFMax-%Pred-Pre 54 %    FEF2575-Pred 2.83 L/sec    FEF2575-Pre 0.71 L/sec    DYN2562-%Pred-Pre 25 %    ExpTime-Pre 8.77 sec    FIFMax-Pre 5.10 L/sec    FEV1FEV6-Pred 80 %    FEV1FEV6-Pre 64 %   Basic metabolic panel    Collection Time: 03/16/22 10:38 AM   Result Value Ref Range    Sodium 142 133 - 144 mmol/L    Potassium 4.3 3.4 - 5.3 mmol/L    Chloride 105 94 - 109 mmol/L    Carbon Dioxide (CO2) 28 20 - 32 mmol/L    Anion Gap 9 3 - 14 mmol/L    Urea Nitrogen 16 7 - 30 mg/dL    Creatinine 1.19 0.66 - 1.25 mg/dL    Calcium 9.2 8.5 - 10.1 mg/dL    Glucose 122 (H) 70 - 99 mg/dL    GFR Estimate 72 >60 mL/min/1.73m2   Magnesium    Collection Time: 03/16/22 10:38 AM   Result Value Ref Range    Magnesium 1.9 1.6 - 2.3 mg/dL   CBC with platelets    Collection Time: 03/16/22 10:38 AM   Result Value Ref Range    WBC Count 9.2 4.0 - 11.0 10e3/uL    RBC Count 4.46 4.40 - 5.90 10e6/uL    Hemoglobin 12.1 (L) 13.3 - 17.7 g/dL    Hematocrit 38.0 (L) 40.0 - 53.0 %    MCV 85 78 - 100 fL    MCH 27.1 26.5 - 33.0 pg    MCHC 31.8 31.5 - 36.5 g/dL    RDW 17.1 (H) 10.0 - 15.0 %    Platelet Count 188 150 - 450 10e3/uL   INR    Collection Time: 03/16/22 10:38 AM   Result Value Ref Range    INR 2.51 (H) 0.85 - 1.15   Tacrolimus level    Collection Time: 03/16/22 10:38 AM   Result Value Ref Range    Tacrolimus by Tandem Mass Spectrometry 43.0 (HH) 5.0 - 15.0 ug/L    Tacrolimus Last Dose Date      Tacrolimus Last Dose Time     CMV DNA quantification    Collection Time: 03/16/22 10:38 AM    Specimen: Arm, Left; Blood   Result Value Ref Range    CMV DNA IU/mL Not Detected Not  Detected IU/mL                         Results as noted above.    PFT Interpretation:  {Chuck PFT interpretation:836288}  {Increase/decrease:100481}  {JDPFT:421360}  Valid Maneuver

## 2022-03-21 NOTE — NURSING NOTE
Chief Complaint   Patient presents with     RECHECK     Lung tx followup     Blood pressure (!) 148/96, pulse 78, temperature 98.1  F (36.7  C), weight 72.4 kg (159 lb 11.2 oz), SpO2 93 %.    Devika Poole MA

## 2022-03-21 NOTE — TELEPHONE ENCOUNTER
Patient Call: Voicemail  Date/Time: 3/21 @ 1:31pm  Reason for call: patient is wanting to speak with emelia.

## 2022-03-21 NOTE — TELEPHONE ENCOUNTER
Patient received Myfortic at pharmacy. Instructed to take MMF tonight and then start Myfortic in the AM.     Patient verbalized understanding and agreement of plan. Will call back with questions, concerns, updates

## 2022-03-21 NOTE — PROGRESS NOTES
Transplant Coordinator Note    Reason for visit: Post lung transplant follow up visit   Coordinator: Present   Caregiver:      Health concerns addressed today:  1. Respiratory - shortness of breath with activity - improving. Cough.   2. GI: appetite improving, GI. Loose stool 8-10x/day  3. ENT: at baseline  4. Vision - blind spots/double vision, at baseline, followed by ophthalmology     Activity/rehab: pulmonary rehab.   Oxygen needs: room air  Pain management/RX: PRN tylenol. Continues to have joint pain in hands - some relief with volteran gel  Diabetic management: managed by endocrine  Next Bronch due: due 1 month  CMV status: D-/R-  EBV status: D+/R+  DVT/PE:  Post op AFIB/follow up with EP:  AC/asa: Coumadin  PJP prophylactic: Bactrim     COVID:  1. COVID-19 infection (yes/no, date of most recent positive test):   2. Status/instructions given about COVID-19 vaccine:     Pt Education: medications (use/dose/side effects), how/when to call coordinator, frequency of labs, s/s of infection/rejection, call prior to starting any new medications, lab/vital sign book    Health Maintenance:     Last colonoscopy:     Next colonoscopy due:     Dermatology:    Vaccinations this visit:     Labs, CXR, PFTs reviewed with patient  Medication record reviewed and reconciled  Questions and concerns addressed    Patient Instructions  1. Continue to hydrate with 60-70 oz fluids daily.  2. Continue to exercise daily or most days of the week.  3. Follow up with your primary care provider for annual gender health maintenance procedures.  4. Follow up with colonoscopy schedule.  5. Follow up with annual dermatology visits.  6. It doesn't seem like the COVID vaccine is working well in lung transplant patients. A number of lung transplant patients have gotten sick with COVID even after receiving the vaccines.  Based on our recent experience, it can be life-threatening to get COVID  even after being vaccinated. Please continue to act like  you did not get the COVID vaccine - social distancing, wearing a mask, good hand hygiene, etc. If the people around you are vaccinated, it will help reduce the risk of you getting COVID. All members of your household should be vaccinated.  7. Stop Senna-K.   8. Increase Metoprolol to 50mg twice a day.   9. We are going to switch you from mycophenolate (Cellcept) to Myfortic 720mg (2 tablets) twice a day.   10. We are going to switch you to the [employer] High Absorption Magnesium. You can buy this at [employer] or [employer].     Next transplant clinic appointment: 1 month with CXR, labs and PFTs  Next lab draw: pending tacrolimus level, otherwise weekly.       AVS printed at time of check out

## 2022-03-22 DIAGNOSIS — D84.9 IMMUNOSUPPRESSED STATUS (H): ICD-10-CM

## 2022-03-22 DIAGNOSIS — Z94.2 S/P LUNG TRANSPLANT (H): Primary | ICD-10-CM

## 2022-03-22 LAB
CMV DNA SPEC NAA+PROBE-ACNC: NOT DETECTED IU/ML
DONOR IDENTIFICATION: NORMAL
DONOR IDENTIFICATION: NORMAL
DQB5: 2117
DQB5: 2254
DSA COMMENTS: NORMAL
DSA COMMENTS: NORMAL
DSA PRESENT: YES
DSA PRESENT: YES
DSA TEST METHOD: NORMAL
DSA TEST METHOD: NORMAL
ORGAN: NORMAL
ORGAN: NORMAL
SA 1 CELL: NORMAL
SA 1 CELL: NORMAL
SA 1 TEST METHOD: NORMAL
SA 1 TEST METHOD: NORMAL
SA 2 CELL: NORMAL
SA 2 CELL: NORMAL
SA 2 TEST METHOD: NORMAL
SA 2 TEST METHOD: NORMAL
SA1 HI RISK ABY: NORMAL
SA1 HI RISK ABY: NORMAL
SA1 MOD RISK ABY: NORMAL
SA1 MOD RISK ABY: NORMAL
SA2 HI RISK ABY: NORMAL
SA2 HI RISK ABY: NORMAL
SA2 MOD RISK ABY: NORMAL
SA2 MOD RISK ABY: NORMAL
UNACCEPTABLE ANTIGENS: NORMAL
UNACCEPTABLE ANTIGENS: NORMAL
UNOS CPRA: 26
UNOS CPRA: 26
ZZZSA 1  COMMENTS: NORMAL
ZZZSA 1  COMMENTS: NORMAL
ZZZSA 2 COMMENTS: NORMAL
ZZZSA 2 COMMENTS: NORMAL

## 2022-03-22 NOTE — RESULT ENCOUNTER NOTE
Tacrolimus level 8.8 at 12 hours, on 3/21/2022.  Goal 8-12.   Current dose 3 mg in AM, 3 mg in PM    Level at goal, no change in dose.   Boutir message sent

## 2022-03-23 ENCOUNTER — PRE VISIT (OUTPATIENT)
Dept: RHEUMATOLOGY | Facility: CLINIC | Age: 57
End: 2022-03-23
Payer: COMMERCIAL

## 2022-03-23 ENCOUNTER — OFFICE VISIT (OUTPATIENT)
Dept: RHEUMATOLOGY | Facility: CLINIC | Age: 57
End: 2022-03-23
Attending: INTERNAL MEDICINE
Payer: COMMERCIAL

## 2022-03-23 VITALS
TEMPERATURE: 98.1 F | WEIGHT: 159 LBS | SYSTOLIC BLOOD PRESSURE: 154 MMHG | DIASTOLIC BLOOD PRESSURE: 89 MMHG | OXYGEN SATURATION: 93 % | HEART RATE: 69 BPM | BODY MASS INDEX: 27.29 KG/M2

## 2022-03-23 DIAGNOSIS — Z94.2 S/P LUNG TRANSPLANT (H): Primary | ICD-10-CM

## 2022-03-23 DIAGNOSIS — I63.9 ISCHEMIC STROKE (H): ICD-10-CM

## 2022-03-23 DIAGNOSIS — D84.9 IMMUNOSUPPRESSED STATUS (H): ICD-10-CM

## 2022-03-23 DIAGNOSIS — M06.9 RHEUMATOID ARTHRITIS INVOLVING BOTH HANDS, UNSPECIFIED WHETHER RHEUMATOID FACTOR PRESENT (H): ICD-10-CM

## 2022-03-23 DIAGNOSIS — E83.42 HYPOMAGNESEMIA: ICD-10-CM

## 2022-03-23 LAB
ACID FAST STAIN (ARUP): NORMAL
ACID FAST STAIN (ARUP): NORMAL

## 2022-03-23 PROCEDURE — 99214 OFFICE O/P EST MOD 30 MIN: CPT | Mod: GC | Performed by: STUDENT IN AN ORGANIZED HEALTH CARE EDUCATION/TRAINING PROGRAM

## 2022-03-23 PROCEDURE — G0463 HOSPITAL OUTPT CLINIC VISIT: HCPCS

## 2022-03-23 RX ORDER — MAGNESIUM GLYCINATE 15 %
300 POWDER (GRAM) MISCELLANEOUS 2 TIMES DAILY
Qty: 18 G | Refills: 11 | Status: SHIPPED | OUTPATIENT
Start: 2022-03-23 | End: 2022-03-24

## 2022-03-23 ASSESSMENT — PAIN SCALES - GENERAL: PAINLEVEL: NO PAIN (0)

## 2022-03-23 NOTE — TELEPHONE ENCOUNTER
NOTES Status Details   OFFICE NOTE from referring provider Internal 01.25.2022 Abiodun Woods MD   MEDICATION LIST Internal    LABS (Any and all labs)      Internal    Imaging (All imaging related to diagnoses)     CXR Internal 03.21.2022, 02.10.2022 XR CHEST 2 VW    02.28.2022 CT CHEST W/O CONTRAST

## 2022-03-23 NOTE — PROGRESS NOTES
Rheumatology Consult Note- New patient    Edson Thornton MRN# [id number]   Age: 56 year old YOB: 1965     Referring provider: Abiodun Woods    Reason for consult: Rheumatoid arthritis      Assessment & Plan:     ASSESSMENT:  Edson Thornton is a 56 year old male with a hx of hypertension, hyperlipidemia, hypothyroidism, SALTY, diagnosed with rheumatoid arthritis in May 2021 in [place], NSIP/ILD s/p bilateral lung transplant  presents after being referred by his lung transplant physician for evaluation of his rheumatoid arthritis and to consider establishing care here.   Patient has been on a prednisone per lung transplant team, he is being tapered down, currently at 7.5 mg in morning and 5 mg in evening. Today's exam shows no active synovitis, but tenderness is noted in few small hand joints.     Lab workup 3/2/2021 ESR 44, CRP 63.6.    5/11/2021: ,   7/19/2021 MPO/ PR3 negative  Most recent labs in March 2022 with stable CBC, liver/kidney function.     Imaging:  Hand Xray left 2020 There are no acute fractures or dislocations identified in the left hand. Mild degenerative changes noted in the small joints of the left hand.Type II lunate. No soft tissue abnormalities visualized.     Hand Xray right 2020: There are no acute fractures or dislocations identified in the right hand. Mild degenerative changes noted in the small joints.Type II lunate. No soft tissue abnormalities noted.    EXAM: XRAY KNEE 3 VIEWS ANDREWS 11/2020: No acute bony/soft tissue abnormalities.    Xray left ankle :EXAM: XRAY KNEE 3 VIEWS ANDREWS: No acute bony, soft tissue abnormalities.     CT chest 6/28/2022  IMPRESSION:   1.  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   2.  Mediastinal and bilateral hilar lymphadenopathy is likely inflammatory/reactive secondary to lung disease.   3.  Healed pulmonary granulomatous disease.     CT chest wo contrast 2/28/2022 Impression:   1. Postoperative  changes of bilateral lung transplantation with  unchanged sternal dehiscence.  2. Expiratory images reveal lobular air trapping most prominent within  the right lower lobe.  3. Reduced size of loculated hydropneumothorax within the left lung  base. Reduced left posterior and right basilar effusions.      DIAGNOSIS:  1. Seropositive RA, CCP/RF+  2. ILD s/p bilateral lung transplant, on immunosuppression    PLAN:  Patient has a diagnosis of RA, diagnosed by his prior rheumatologist in [place] based on synovitis, symmetric joint involvement, positive RF/CCP. We discussed with the patient that based on his course/ exam, we do not think he has signs of active inflammatory disease. But as his prednisone is tapered down, he may experience worsening of his joint symptoms.     We discussed following recommendations and patient agrees:    To continue the prednisone taper as prescribed by lung transplant team.  He should inform us if joint symptoms worsen as he is going down on the prednisone dose.   We discussed that if things get worse, we could start Plaquenil as current Myfortic/tacrolimus will not benefit in terms of the RA/its progression. He has had optic neuropathy/vision changes since stroke so we would have to check with his eye specialist before starting the Plaquenil.   Biologics like IL-6 inhibitors would be another option at the time of symptom worsening but we explained that with the mild severity of his arthritis, and with the side effects of biologics, we would like to avoid unless disease is not controlled with the Plaquenil.   We will follow up in 3-4 months. He would try to coordinate it with his lung transplant visits to [place]. Plans to go back to [place] today.   Recommend to follow COVID vaccine recommendations from pulmonology, they do not recommend vaccination at this time. Patient has received Evusheld about 4 months ago.   Recommend to discuss about other vaccines with his primary care  "provider, as we do not have much for records/patient would like to discuss with primary care.    Case seen and discussed with Dr. Camarillo who agrees with above assessment and plan.    Shiva Goldberg MD  PGY3, Internal Medicine    \"This note was generated using dragon software and reviewed by myself.  Please excuse any grammatical or spelling errors above\".     Staff addendum  I performed the history and physical examination of the patient and discussed the management with the resident. I reviewed the available lab and imaging studies. I reviewed the resident s note and agree with the documented findings and plan of care.    Gume Camarillo MD  Rheumatology      HPI     Edson Thornton is a 56 year old male with a hx of hypertension, hyperlipidemia, hypothyroidism, SALTY, diagnosed with rheumatoid arthritis in May 2021 in [place], NSIP/ILD s/p bilateral lung transplant  presents after being referred by his lung transplant physician for evaluation of his rheumatoid arthritis.     Patient was diagnosed in May 2021 by rheumatologist Dr. Yoo in [place], [place], when found to have seropositive RF/CCP +, non-nodular, RA with active synovitis. Patient was started on leflunomide in May later discontinued in July 2021 due to concern of worsening ILD/NSIP.   Patient had symptoms for almost a year before that mainly reporting as pain in bilateral hands PIPs, MCPs, wrists, knees, ankles and MTPs, along with morning stiffness of about 45 mins. Also had swelling in hands. He could not  things, bend due to knee stiffness/pain.  Patient had been on prednisone off and on for it by his PCP, then saw orthopedics for his knee pain/swelling, got injections, also initially thought to have trigger fingers by PCP, then seen by another doctor who was suspicious for rheumatoid arthritis, patient got workup done and was referred to rheumatology.   After discontinuation of the leflunomide in July 2021 with concerns of worsening lung disease, " he got a dose of Rituximab 1000mg IV on 8/6/2021. Also on steroids per pulmonology. Then, he was hospitalized in Sept 2021 in [place] for respiratory failure, he received another dose of Rituximab 9/3/2021,  referred to [facility] for lung transplant evaluation. During the hospitalization, per rheumatology evaluation at the  the inflammatory arthritis was quiet, it was thought that there is a small chance of leflunomide induced pneumonitis. Since it could stay in the system for a long time,  patient received a cholestyramine washout for 11 days. Patient was able to get bilateral lung transplant on 10/16/2021.    Postop course was complicated by encephalopathy, subacute CVA with multifocal subacute infarcts in both cerebral hemispheres and left cerebellum presumed embolic with A. fib, A. fib with RVR started on warfarin, diarrhea, BRENNAN, candidemia, brief bradycardic arrest/subsequent hypotension in the setting of GI bleed.  For immunosuppression he received basiliximab on 10/16 and 10/20/2021.  Patient was started on tacrolimus, mycophenolate and prednisone per transplant pulmonary medicine. Also treated for left lung cavitary lesion/hospital-acquired pneumonia.  Received treatment with IVIG for hypogammaglobulinemia.  Transferred to rehab on 12/13/2021, functionally making excellent progress, discharged on 12/30/2021.     Due to his lung transplant, he was given Euvasheld for preexposure protection from COVID-19 about 3-4 mths ago, has not received COVID-19 vaccine in the past.  And transplant physicians would like to hold off on covid vaccine at the time.  Unsure of other vaccinations, not in record.    Patient reports that he developed diarrhea with the mycophenolate and has had that since that time, more recently worsening, had about 9-10 watery bowel movements yesterday, 3-4 today.  Medication was changed to Myfortic just recently.  Patient has been on prednisone 7.5 mg in the morning and 5 mg  in the evening.  To be tapered down to 5 mg in the morning and 5 mg in the evening, and then 5 mg / 2.5 mg by the end of next month. His joints have not been really bothersome while on steroids but he wonders about his disease progression as he is tapered down. Today, he reports morning stiffness of only about 5 mins, has generalized pain in hand joints, R hand worse than left, has not noticed much for swelling. He has not had knee pains/swelling like in the past. He reports fatigue but reports that he has many reasons to feel fatigued. He has had discomfort with vision since his stroke following with ophthalmology. His dyspnea has been stable, not on any supplemental oxygen. He also has intermittent dry/productive cough that is chronic . He has numbness in his fingertips/toes.  Patient denies any fevers, chills, dry eyes/mouth, weight loss, headaches, Raynaud's, myalgias, rash,  photosensitivity, nasal or oral ulcers, ear fullness or drainage, hematuria, melena.     Social history: Denies tobacco use, alcohol or recreational drug use  Family history: Dad/mom had RA        HISTORY REVIEW:  Past Medical History:   Diagnosis Date    BRENNAN (acute kidney injury) (H) 10/17/2021    Anxiety     Depression     HLD (hyperlipidemia)     HTN (hypertension)     Hypothyroidism     ILD (interstitial lung disease) (H)     SALTY on CPAP     Oxygen dependent     BL 4L since ~6/2021    Primary hypertension 2/28/2022    Rheumatoid arthritis (H)     signs ~5/2020, dx 5/2021    S/P lung transplant (H) 10/16/2021    Shock liver 10/17/2021    Steroid-induced hyperglycemia     Traction bronchiectasis (H)      Past Surgical History:   Procedure Laterality Date    BRONCHOSCOPY (RIGID OR FLEXIBLE), DIAGNOSTIC N/A 1/26/2022    Procedure: BRONCHOSCOPY, WITH BRONCHOALVEOLAR LAVAGE AND BIOPSY;  Surgeon: Perlman, David Morris, MD;  Location:  GI    COLONOSCOPY W/ BIOPSIES AND POLYPECTOMY  07/21/2020    CV CORONARY ANGIOGRAM N/A 09/08/2021     Procedure: Coronary Angiogram with possible intervention;  Surgeon: Jovon Bullock MD;  Location:  HEART CARDIAC CATH LAB    CV RIGHT HEART CATH MEASUREMENTS RECORDED N/A 09/08/2021    Procedure: Right Heart Cath;  Surgeon: Jovon Bullock MD;  Location:  HEART CARDIAC CATH LAB    ESOPHAGOSCOPY, GASTROSCOPY, DUODENOSCOPY (EGD), COMBINED N/A 10/23/2021    Procedure: ESOPHAGOGASTRODUODENOSCOPY (EGD);  Surgeon: Miquel Pisano MD;  Location:  GI    ESOPHAGOSCOPY, GASTROSCOPY, DUODENOSCOPY (EGD), COMBINED N/A 11/02/2021    Procedure: ESOPHAGOGASTRODUODENOSCOPY (EGD);  Surgeon: Daniel Ortiz MD;  Location:  GI    ESOPHAGOSCOPY, GASTROSCOPY, DUODENOSCOPY (EGD), COMBINED N/A 11/5/2021    Procedure: ESOPHAGOGASTRODUODENOSCOPY (EGD);  Surgeon: Ronnell Hernandez MD;  Location:  GI    EXTRACTION(S) DENTAL N/A 09/22/2021    Procedure: EXTRACTION tooth #19;  Surgeon: Deepak Tobin DDS;  Location: [facility]    HERNIA REPAIR      IR CHEST TUBE PLACEMENT NON-TUNNELLED LEFT  11/03/2021    PICC TRIPLE LUMEN PLACEMENT Left 11/04/2021    5FR TL PICC. Right non occlusive thrombus subclavian vein.    REPLACE GASTROJEJUNOSTOMY TUBE, PERCUTANEOUS N/A 11/9/2021    Procedure: REPLACEMENT, GASTROJEJUNOSTOMY TUBE, PERCUTANEOUS;  Surgeon: Hernando Rodriguez MD;  [facility]    right acl      TRACHEOSTOMY PERCUTANEOUS N/A 10/29/2021    Procedure: Percutaneous Tracheostomy,;  Surgeon: Celine Jenkins MD;  Location: [facility]    TRANSPLANT LUNG RECIPIENT SINGLE X2 Bilateral 10/16/2021    Procedure: TRANSPLANT, LUNG, RECIPIENT, BILATERAL, Bronchoscopy, on-pump perfusion, bilateral clamshell sternotomy;  Surgeon: Yanick Corral MD;  Location: [facility] OR    XR ACROMIOCLAVICULAR JOINT BILATERAL       Family History   Problem Relation Age of Onset    Diabetes Type 1 Mother     Heart Disease Mother     Chronic Obstructive Pulmonary Disease Mother     Rheumatoid Arthritis Father     Emphysema  Paternal Grandfather      Social History     Socioeconomic History    Marital status: Single     Spouse name: Not on file    Number of children: Not on file    Years of education: Not on file    Highest education level: Not on file   Occupational History    Not on file   Tobacco Use    Smoking status: Never Smoker    Smokeless tobacco: Never Used   Substance and Sexual Activity    Alcohol use: Never    Drug use: Never    Sexual activity: Not on file   Other Topics Concern    Parent/sibling w/ CABG, MI or angioplasty before 65F 55M? Not Asked   Social History Narrative    Not on file     Social Determinants of Health     Financial Resource Strain: Not on file   Food Insecurity: Not on file   Transportation Needs: Not on file   Physical Activity: Not on file   Stress: Not on file   Social Connections: Not on file   Intimate Partner Violence: Not on file   Housing Stability: Not on file     Patient Active Problem List   Diagnosis    S/P lung transplant (H)    BRENNAN (acute kidney injury) (H)    Shock liver    Ischemic stroke (H)    Atrial fibrillation, unspecified type (H)    Encounter for long-term (current) use of high-risk medication    Immunosuppressed status (H)    Primary hypertension    Pulmonary air trapping    Hypomagnesemia     No Known Allergies      ROS     A 14 point ROS was performed with pertinent findings listed above.      Objective     PHYSICAL EXAM  BP (!) 154/89   Pulse 69   Temp 98.1  F (36.7  C)   Wt 72.1 kg (159 lb)   SpO2 93%   BMI 27.29 kg/m    Wt Readings from Last 4 Encounters:   03/23/22 72.1 kg (159 lb)   03/21/22 72.4 kg (159 lb 11.2 oz)   03/15/22 71.2 kg (157 lb)   03/01/22 70.8 kg (156 lb)     Constitutional: WD-WN-WG cooperative  Eyes: nl EOM,  vision, conjunctiva, sclera  ENT: nl external ears, nose, hearing, lips, teeth, gums, throat  No mucous membrane lesions, normal saliva pool  Neck: no mass   Resp: lungs clear to auscultation  CV: RRR, no murmurs, rubs or gallops, no  edema  GI: no ABD mass or tenderness  Lymph: no cervical  MSK:  On inspection, No visible redness warmth or swelling in any joint   Hands: dorsal and palmar surfaces normal, able to spread fingers and make fist, tenderness noted in R 2nd MCP, left 2-3 MCPs, no synovitis or tenderness in other MCP, PIP or DIP joints, no swan neck or boutonnieres deforities  Wrist and Elbows: left wrist tender,  flexion and extension WNL, non tender   Shoulders: abduction preserved to 180 degrees, normal internal and external rotation, nontender AC joint, SC joint, bicipital tendon insertion.  Hips: normal flexion and internal and external rotation of hip with knees flexed.   Knees: flexion and extension of knee normal  Ankles: non tender, no swelling, normal ROM  Feet: non tender subtalar and talar joint, non tender MTP, PIP and DIP joints, normal dorsiflexion and plantarflexion  Strength/Sensory: normal strength 5/5 in proximal and distal muscle groups, normal sensation in all 4 extremities  Skin: no nail pitting, alopecia, rash, nodules or lesions, no nailfold hypertrophy or ragged edges  Psych: nl judgement, orientation, memory, affect.    CBC:  Recent Labs   Lab Test 03/21/22  0956 03/16/22  1038 03/15/22  0803   WBC 8.2 9.2 9.7   RBC 4.56 4.46 4.58   HGB 12.5* 12.1* 12.3*   HCT 38.7* 38.0* 38.1*   MCV 85 85 83   MCH 27.4 27.1 26.9   MCHC 32.3 31.8 32.3   RDW 16.2* 17.1* 17.0*    188 187       BMP:  Recent Labs   Lab Test 03/21/22  0956 03/16/22  1038 03/15/22  0803    142 144   POTASSIUM 4.1 4.3 3.6   CHLORIDE 105 105 108   CO2 29 28 25   ANIONGAP 8 9 11   * 122* 105*   BUN 21 16 22   CR 1.10 1.19 1.18   GFRESTIMATED 79 72 72   ERIK 9.6 9.2 9.2       LFT:  Recent Labs   Lab Test 03/02/22  1631 12/23/21  0714 12/12/21  0519   PROTTOTAL 6.5* 6.0* 5.8*   ALBUMIN 3.5 2.8* 2.5*   BILITOTAL 0.2 0.2 0.2   ALKPHOS 69 91 104   AST 24 22 17   ALT 32 31 26       No results found for: CKTOTAL  TSH   Date Value Ref Range  Status   11/19/2021 3.17 0.40 - 4.00 mU/L Final     No results found for: URIC    Inflammatory markers  Lab Results   Component Value Date    CRP 53.0 10/29/2021    CRP 23.0 10/14/2021    CRP 17.0 10/12/2021     Lab Results   Component Value Date    SED 87 10/29/2021     Ferritin   Date Value Ref Range Status   09/09/2021 1,049 (H) 26 - 388 ng/mL Final       UA RESULTS:  Recent Labs   Lab Test 11/01/21  1336 10/25/21  1507 10/22/21  1641   COLOR Yellow Light Yellow Light Yellow   APPEARANCE Clear Clear Clear   URINEGLC Negative Negative Negative   URINEBILI Negative Negative Negative   URINEKETONE Negative Negative Negative   SG 1.025 1.010 1.005   UBLD Moderate* Negative Negative   URINEPH 5.5 5.5 7.5*   PROTEIN 30 * 20 * 50 *   NITRITE Negative Negative Negative   LEUKEST Negative Negative Negative   RBCU 71* 1 1   WBCU 0 2 3         Autoimmunity labs:     No results found for: RHF  No results found for: CCPIGG  No results found for: ANCA  No results found for: O8YKXVL  No results found for: G0CZPGX  No results found for: MARIN  No results found for: DNA  Lab Results   Component Value Date    RNPIGG Negative 09/07/2021    SMIGG Negative 09/07/2021    SSAIGG Negative 09/07/2021    SSBIGG Negative 09/07/2021     Available imaging reviewed.

## 2022-03-23 NOTE — LETTER
3/23/2022       RE: Edson Thornton  [address] [place] [address]     Dear Colleague,    Thank you for referring your patient, Edson Thornton, to the [facility] RHEUMATOLOGY CLINIC [place] at [facility]. Please see a copy of my visit note below.      Rheumatology Consult Note- New patient    Edson Thornton MRN# [id number]   Age: 56 year old YOB: 1965     Referring provider: Abiodun Woods    Reason for consult: Rheumatoid arthritis      Assessment & Plan:     ASSESSMENT:  Edson Thornton is a 56 year old male with a hx of hypertension, hyperlipidemia, hypothyroidism, SALTY, diagnosed with rheumatoid arthritis in May 2021 in [place], NSIP/ILD s/p bilateral lung transplant  presents after being referred by his lung transplant physician for evaluation of his rheumatoid arthritis and to consider establishing care here.   Patient has been on a prednisone per lung transplant team, he is being tapered down, currently at 7.5 mg in morning and 5 mg in evening. Today's exam shows no active synovitis, but tenderness is noted in few small hand joints.     Lab workup 3/2/2021 ESR 44, CRP 63.6.    5/11/2021: ,   7/19/2021 MPO/ PR3 negative  Most recent labs in March 2022 with stable CBC, liver/kidney function.     Imaging:  Hand Xray left 2020 There are no acute fractures or dislocations identified in the left hand. Mild degenerative changes noted in the small joints of the left hand.Type II lunate. No soft tissue abnormalities visualized.     Hand Xray right 2020: There are no acute fractures or dislocations identified in the right hand. Mild degenerative changes noted in the small joints.Type II lunate. No soft tissue abnormalities noted.    EXAM: XRAY KNEE 3 VIEWS ANDREWS 11/2020: No acute bony/soft tissue abnormalities.    Xray left ankle :EXAM: XRAY KNEE 3 VIEWS ANDREWS: No acute bony, soft tissue abnormalities.     CT chest  6/28/2022  IMPRESSION:   1.  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   2.  Mediastinal and bilateral hilar lymphadenopathy is likely inflammatory/reactive secondary to lung disease.   3.  Healed pulmonary granulomatous disease.     CT chest wo contrast 2/28/2022 Impression:   1. Postoperative changes of bilateral lung transplantation with  unchanged sternal dehiscence.  2. Expiratory images reveal lobular air trapping most prominent within  the right lower lobe.  3. Reduced size of loculated hydropneumothorax within the left lung  base. Reduced left posterior and right basilar effusions.      DIAGNOSIS:  1. Seropositive RA, CCP/RF+  2. ILD s/p bilateral lung transplant, on immunosuppression    PLAN:  Patient has a diagnosis of RA, diagnosed by his prior rheumatologist in [place] based on synovitis, symmetric joint involvement, positive RF/CCP. We discussed with the patient that based on his course/ exam, we do not think he has signs of active inflammatory disease. But as his prednisone is tapered down, he may experience worsening of his joint symptoms.     We discussed following recommendations and patient agrees:      To continue the prednisone taper as prescribed by lung transplant team.    He should inform us if joint symptoms worsen as he is going down on the prednisone dose.     We discussed that if things get worse, we could start Plaquenil as current Myfortic/tacrolimus will not benefit in terms of the RA/its progression. He has had optic neuropathy/vision changes since stroke so we would have to check with his eye specialist before starting the Plaquenil.     Biologics like IL-6 inhibitors would be another option at the time of symptom worsening but we explained that with the mild severity of his arthritis, and with the side effects of biologics, we would like to avoid unless disease is not controlled with the Plaquenil.     We will follow up in 3-4 months. He would try to  "coordinate it with his lung transplant visits to [place]. Plans to go back to [place] today.     Recommend to follow COVID vaccine recommendations from pulmonology, they do not recommend vaccination at this time. Patient has received Evusheld about 4 months ago.     Recommend to discuss about other vaccines with his primary care provider, as we do not have much for records/patient would like to discuss with primary care.    Case seen and discussed with Dr. Camarillo who agrees with above assessment and plan.    Shiva Goldberg MD  PGY3, Internal Medicine    \"This note was generated using dragon software and reviewed by myself.  Please excuse any grammatical or spelling errors above\".     Staff addendum  I performed the history and physical examination of the patient and discussed the management with the resident. I reviewed the available lab and imaging studies. I reviewed the resident s note and agree with the documented findings and plan of care.    Gume Camarillo MD  Rheumatology      HPI     Edson Thornton is a 56 year old male with a hx of hypertension, hyperlipidemia, hypothyroidism, SALTY, diagnosed with rheumatoid arthritis in May 2021 in [place], NSIP/ILD s/p bilateral lung transplant  presents after being referred by his lung transplant physician for evaluation of his rheumatoid arthritis.     Patient was diagnosed in May 2021 by rheumatologist Dr. Yoo in [place], [place], when found to have seropositive RF/CCP +, non-nodular, RA with active synovitis. Patient was started on leflunomide in May later discontinued in July 2021 due to concern of worsening ILD/NSIP.   Patient had symptoms for almost a year before that mainly reporting as pain in bilateral hands PIPs, MCPs, wrists, knees, ankles and MTPs, along with morning stiffness of about 45 mins. Also had swelling in hands. He could not  things, bend due to knee stiffness/pain.  Patient had been on prednisone off and on for it by his PCP, then saw " orthopedics for his knee pain/swelling, got injections, also initially thought to have trigger fingers by PCP, then seen by another doctor who was suspicious for rheumatoid arthritis, patient got workup done and was referred to rheumatology.   After discontinuation of the leflunomide in July 2021 with concerns of worsening lung disease, he got a dose of Rituximab 1000mg IV on 8/6/2021. Also on steroids per pulmonology. Then, he was hospitalized in Sept 2021 in [place] for respiratory failure, he received another dose of Rituximab 9/3/2021,  referred to [facility] for lung transplant evaluation. During the hospitalization, per rheumatology evaluation at the  the inflammatory arthritis was quiet, it was thought that there is a small chance of leflunomide induced pneumonitis. Since it could stay in the system for a long time,  patient received a cholestyramine washout for 11 days. Patient was able to get bilateral lung transplant on 10/16/2021.    Postop course was complicated by encephalopathy, subacute CVA with multifocal subacute infarcts in both cerebral hemispheres and left cerebellum presumed embolic with A. fib, A. fib with RVR started on warfarin, diarrhea, BRENNAN, candidemia, brief bradycardic arrest/subsequent hypotension in the setting of GI bleed.  For immunosuppression he received basiliximab on 10/16 and 10/20/2021.  Patient was started on tacrolimus, mycophenolate and prednisone per transplant pulmonary medicine. Also treated for left lung cavitary lesion/hospital-acquired pneumonia.  Received treatment with IVIG for hypogammaglobulinemia.  Transferred to rehab on 12/13/2021, functionally making excellent progress, discharged on 12/30/2021.     Due to his lung transplant, he was given Euvasheld for preexposure protection from COVID-19 about 3-4 mths ago, has not received COVID-19 vaccine in the past.  And transplant physicians would like to hold off on covid vaccine at the time.  Unsure of  other vaccinations, not in record.    Patient reports that he developed diarrhea with the mycophenolate and has had that since that time, more recently worsening, had about 9-10 watery bowel movements yesterday, 3-4 today.  Medication was changed to Myfortic just recently.  Patient has been on prednisone 7.5 mg in the morning and 5 mg in the evening.  To be tapered down to 5 mg in the morning and 5 mg in the evening, and then 5 mg / 2.5 mg by the end of next month. His joints have not been really bothersome while on steroids but he wonders about his disease progression as he is tapered down. Today, he reports morning stiffness of only about 5 mins, has generalized pain in hand joints, R hand worse than left, has not noticed much for swelling. He has not had knee pains/swelling like in the past. He reports fatigue but reports that he has many reasons to feel fatigued. He has had discomfort with vision since his stroke following with ophthalmology. His dyspnea has been stable, not on any supplemental oxygen. He also has intermittent dry/productive cough that is chronic . He has numbness in his fingertips/toes.  Patient denies any fevers, chills, dry eyes/mouth, weight loss, headaches, Raynaud's, myalgias, rash,  photosensitivity, nasal or oral ulcers, ear fullness or drainage, hematuria, melena.     Social history: Denies tobacco use, alcohol or recreational drug use  Family history: Dad/mom had RA        HISTORY REVIEW:  Past Medical History:   Diagnosis Date     BRENNAN (acute kidney injury) (H) 10/17/2021     Anxiety      Depression      HLD (hyperlipidemia)      HTN (hypertension)      Hypothyroidism      ILD (interstitial lung disease) (H)      SALTY on CPAP      Oxygen dependent     BL 4L since ~6/2021     Primary hypertension 2/28/2022     Rheumatoid arthritis (H)     signs ~5/2020, dx 5/2021     S/P lung transplant (H) 10/16/2021     Shock liver 10/17/2021     Steroid-induced hyperglycemia      Traction  bronchiectasis (H)      Past Surgical History:   Procedure Laterality Date     BRONCHOSCOPY (RIGID OR FLEXIBLE), DIAGNOSTIC N/A 1/26/2022    Procedure: BRONCHOSCOPY, WITH BRONCHOALVEOLAR LAVAGE AND BIOPSY;  Surgeon: Perlman, David Morris, MD;  Location:  GI     COLONOSCOPY W/ BIOPSIES AND POLYPECTOMY  07/21/2020     CV CORONARY ANGIOGRAM N/A 09/08/2021    Procedure: Coronary Angiogram with possible intervention;  Surgeon: Jovon Bullock MD;  Location:  HEART CARDIAC CATH LAB     CV RIGHT HEART CATH MEASUREMENTS RECORDED N/A 09/08/2021    Procedure: Right Heart Cath;  Surgeon: Jovon Bullock MD;  Location:  HEART CARDIAC CATH LAB     ESOPHAGOSCOPY, GASTROSCOPY, DUODENOSCOPY (EGD), COMBINED N/A 10/23/2021    Procedure: ESOPHAGOGASTRODUODENOSCOPY (EGD);  Surgeon: Miquel Pisano MD;  Location:  GI     ESOPHAGOSCOPY, GASTROSCOPY, DUODENOSCOPY (EGD), COMBINED N/A 11/02/2021    Procedure: ESOPHAGOGASTRODUODENOSCOPY (EGD);  Surgeon: Daniel Ortiz MD;  Location:  GI     ESOPHAGOSCOPY, GASTROSCOPY, DUODENOSCOPY (EGD), COMBINED N/A 11/5/2021    Procedure: ESOPHAGOGASTRODUODENOSCOPY (EGD);  Surgeon: Ronnell Hernandez MD;  Location:  GI     EXTRACTION(S) DENTAL N/A 09/22/2021    Procedure: EXTRACTION tooth #19;  Surgeon: Deepak Tobin DDS;  Location:  OR     HERNIA REPAIR       IR CHEST TUBE PLACEMENT NON-TUNNELLED LEFT  11/03/2021     PICC TRIPLE LUMEN PLACEMENT Left 11/04/2021    5FR TL PICC. Right non occlusive thrombus subclavian vein.     REPLACE GASTROJEJUNOSTOMY TUBE, PERCUTANEOUS N/A 11/9/2021    Procedure: REPLACEMENT, GASTROJEJUNOSTOMY TUBE, PERCUTANEOUS;  Surgeon: Hernando Rodriguez MD;  [facility]: [facility]     right acl       TRACHEOSTOMY PERCUTANEOUS N/A 10/29/2021    Procedure: Percutaneous Tracheostomy,;  Surgeon: eCline Jenkins MD;  Location: [facility] OR     TRANSPLANT LUNG RECIPIENT SINGLE X2 Bilateral 10/16/2021    Procedure: TRANSPLANT, LUNG, RECIPIENT,  BILATERAL, Bronchoscopy, on-pump perfusion, bilateral clamshell sternotomy;  Surgeon: Yanick Corral MD;  Location: [facility] OR     XR ACROMIOCLAVICULAR JOINT BILATERAL       Family History   Problem Relation Age of Onset     Diabetes Type 1 Mother      Heart Disease Mother      Chronic Obstructive Pulmonary Disease Mother      Rheumatoid Arthritis Father      Emphysema Paternal Grandfather      Social History     Socioeconomic History     Marital status: Single     Spouse name: Not on file     Number of children: Not on file     Years of education: Not on file     Highest education level: Not on file   Occupational History     Not on file   Tobacco Use     Smoking status: Never Smoker     Smokeless tobacco: Never Used   Substance and Sexual Activity     Alcohol use: Never     Drug use: Never     Sexual activity: Not on file   Other Topics Concern     Parent/sibling w/ CABG, MI or angioplasty before 65F 55M? Not Asked   Social History Narrative     Not on file     Social Determinants of Health     Financial Resource Strain: Not on file   Food Insecurity: Not on file   Transportation Needs: Not on file   Physical Activity: Not on file   Stress: Not on file   Social Connections: Not on file   Intimate Partner Violence: Not on file   Housing Stability: Not on file     Patient Active Problem List   Diagnosis     S/P lung transplant (H)     BRENNAN (acute kidney injury) (H)     Shock liver     Ischemic stroke (H)     Atrial fibrillation, unspecified type (H)     Encounter for long-term (current) use of high-risk medication     Immunosuppressed status (H)     Primary hypertension     Pulmonary air trapping     Hypomagnesemia     No Known Allergies      ROS     A 14 point ROS was performed with pertinent findings listed above.      Objective     PHYSICAL EXAM  BP (!) 154/89   Pulse 69   Temp 98.1  F (36.7  C)   Wt 72.1 kg (159 lb)   SpO2 93%   BMI 27.29 kg/m    Wt Readings from Last 4 Encounters:   03/23/22 72.1 kg  (159 lb)   03/21/22 72.4 kg (159 lb 11.2 oz)   03/15/22 71.2 kg (157 lb)   03/01/22 70.8 kg (156 lb)     Constitutional: WD-WN-WG cooperative  Eyes: nl EOM,  vision, conjunctiva, sclera  ENT: nl external ears, nose, hearing, lips, teeth, gums, throat  No mucous membrane lesions, normal saliva pool  Neck: no mass   Resp: lungs clear to auscultation  CV: RRR, no murmurs, rubs or gallops, no edema  GI: no ABD mass or tenderness  Lymph: no cervical  MSK:  On inspection, No visible redness warmth or swelling in any joint   Hands: dorsal and palmar surfaces normal, able to spread fingers and make fist, tenderness noted in R 2nd MCP, left 2-3 MCPs, no synovitis or tenderness in other MCP, PIP or DIP joints, no swan neck or boutonnieres deforities  Wrist and Elbows: left wrist tender,  flexion and extension WNL, non tender   Shoulders: abduction preserved to 180 degrees, normal internal and external rotation, nontender AC joint, SC joint, bicipital tendon insertion.  Hips: normal flexion and internal and external rotation of hip with knees flexed.   Knees: flexion and extension of knee normal  Ankles: non tender, no swelling, normal ROM  Feet: non tender subtalar and talar joint, non tender MTP, PIP and DIP joints, normal dorsiflexion and plantarflexion  Strength/Sensory: normal strength 5/5 in proximal and distal muscle groups, normal sensation in all 4 extremities  Skin: no nail pitting, alopecia, rash, nodules or lesions, no nailfold hypertrophy or ragged edges  Psych: nl judgement, orientation, memory, affect.    CBC:  Recent Labs   Lab Test 03/21/22  0956 03/16/22  1038 03/15/22  0803   WBC 8.2 9.2 9.7   RBC 4.56 4.46 4.58   HGB 12.5* 12.1* 12.3*   HCT 38.7* 38.0* 38.1*   MCV 85 85 83   MCH 27.4 27.1 26.9   MCHC 32.3 31.8 32.3   RDW 16.2* 17.1* 17.0*    188 187       BMP:  Recent Labs   Lab Test 03/21/22  0956 03/16/22  1038 03/15/22  0803    142 144   POTASSIUM 4.1 4.3 3.6   CHLORIDE 105 105 108   CO2 29  28 25   ANIONGAP 8 9 11   * 122* 105*   BUN 21 16 22   CR 1.10 1.19 1.18   GFRESTIMATED 79 72 72   ERIK 9.6 9.2 9.2       LFT:  Recent Labs   Lab Test 03/02/22  1631 12/23/21  0714 12/12/21  0519   PROTTOTAL 6.5* 6.0* 5.8*   ALBUMIN 3.5 2.8* 2.5*   BILITOTAL 0.2 0.2 0.2   ALKPHOS 69 91 104   AST 24 22 17   ALT 32 31 26       No results found for: CKTOTAL  TSH   Date Value Ref Range Status   11/19/2021 3.17 0.40 - 4.00 mU/L Final     No results found for: URIC    Inflammatory markers  Lab Results   Component Value Date    CRP 53.0 10/29/2021    CRP 23.0 10/14/2021    CRP 17.0 10/12/2021     Lab Results   Component Value Date    SED 87 10/29/2021     Ferritin   Date Value Ref Range Status   09/09/2021 1,049 (H) 26 - 388 ng/mL Final       UA RESULTS:  Recent Labs   Lab Test 11/01/21  1336 10/25/21  1507 10/22/21  1641   COLOR Yellow Light Yellow Light Yellow   APPEARANCE Clear Clear Clear   URINEGLC Negative Negative Negative   URINEBILI Negative Negative Negative   URINEKETONE Negative Negative Negative   SG 1.025 1.010 1.005   UBLD Moderate* Negative Negative   URINEPH 5.5 5.5 7.5*   PROTEIN 30 * 20 * 50 *   NITRITE Negative Negative Negative   LEUKEST Negative Negative Negative   RBCU 71* 1 1   WBCU 0 2 3         Autoimmunity labs:     No results found for: RHF  No results found for: CCPIGG  No results found for: ANCA  No results found for: C7CIWHB  No results found for: J1DHYRX  No results found for: MARIN  No results found for: DNA  Lab Results   Component Value Date    RNPIGG Negative 09/07/2021    SMIGG Negative 09/07/2021    SSAIGG Negative 09/07/2021    SSBIGG Negative 09/07/2021     Available imaging reviewed.     Sincerely,    Rhoda Hunter MD

## 2022-03-23 NOTE — NURSING NOTE
Chief Complaint   Patient presents with     Consult     Chuck wanted pt to see someone here before going home. Has RA     Blood pressure (!) 154/89, pulse 69, temperature 98.1  F (36.7  C), weight 72.1 kg (159 lb), SpO2 93 %.    Devika Poole MA

## 2022-03-23 NOTE — PATIENT INSTRUCTIONS
-Continue the prednisone taper as prescribed by lung transplant team.  -Please let us know how your joints symptoms are as you are going down on the prednisone. Please inform us if you develop any worsening symptoms.  -We discussed that if things get worse, we could start Plaquenil.  Biologic medication is another option but with mild severity of your arthritis, and with the side effects of biologics, we would like to avoid it for now unless disease is not controlled with the Plaquenil.   -Follow up in 3-4 months. Please call if you develop any worsening.   -Please follow on COVID vaccine with your lung doctor, and per their recommendations.   -Please discuss about there vaccines with your primary care provider.

## 2022-03-24 DIAGNOSIS — Z79.899 ENCOUNTER FOR LONG-TERM (CURRENT) USE OF HIGH-RISK MEDICATION: ICD-10-CM

## 2022-03-24 DIAGNOSIS — K59.01 SLOW TRANSIT CONSTIPATION: ICD-10-CM

## 2022-03-24 DIAGNOSIS — E83.42 HYPOMAGNESEMIA: ICD-10-CM

## 2022-03-24 DIAGNOSIS — I63.9 ISCHEMIC STROKE (H): ICD-10-CM

## 2022-03-24 DIAGNOSIS — Z94.2 LUNG REPLACED BY TRANSPLANT (H): ICD-10-CM

## 2022-03-24 DIAGNOSIS — M06.9 RHEUMATOID ARTHRITIS INVOLVING BOTH HANDS, UNSPECIFIED WHETHER RHEUMATOID FACTOR PRESENT (H): ICD-10-CM

## 2022-03-24 DIAGNOSIS — R11.0 NAUSEA: ICD-10-CM

## 2022-03-24 DIAGNOSIS — G47.01 INSOMNIA DUE TO MEDICAL CONDITION: ICD-10-CM

## 2022-03-24 DIAGNOSIS — I10 BENIGN ESSENTIAL HYPERTENSION: ICD-10-CM

## 2022-03-24 DIAGNOSIS — Z94.2 S/P LUNG TRANSPLANT (H): Primary | ICD-10-CM

## 2022-03-24 DIAGNOSIS — D84.9 IMMUNOSUPPRESSED STATUS (H): ICD-10-CM

## 2022-03-24 DIAGNOSIS — Z94.2 S/P LUNG TRANSPLANT (H): Chronic | ICD-10-CM

## 2022-03-24 DIAGNOSIS — R00.0 TACHYCARDIA: ICD-10-CM

## 2022-03-24 DIAGNOSIS — Z79.4 TYPE 2 DIABETES MELLITUS WITH HYPERGLYCEMIA, WITH LONG-TERM CURRENT USE OF INSULIN (H): ICD-10-CM

## 2022-03-24 DIAGNOSIS — E11.65 TYPE 2 DIABETES MELLITUS WITH HYPERGLYCEMIA, WITH LONG-TERM CURRENT USE OF INSULIN (H): ICD-10-CM

## 2022-03-24 DIAGNOSIS — I48.91 ATRIAL FIBRILLATION, UNSPECIFIED TYPE (H): ICD-10-CM

## 2022-03-24 RX ORDER — METOPROLOL TARTRATE 25 MG/1
50 TABLET, FILM COATED ORAL 2 TIMES DAILY
Qty: 120 TABLET | Refills: 11 | Status: SHIPPED | OUTPATIENT
Start: 2022-03-24 | End: 2022-09-12

## 2022-03-24 RX ORDER — ESCITALOPRAM OXALATE 20 MG/1
20 TABLET ORAL DAILY
Qty: 30 TABLET | Refills: 11 | Status: SHIPPED | OUTPATIENT
Start: 2022-03-24 | End: 2022-05-06

## 2022-03-24 RX ORDER — WARFARIN SODIUM 1 MG/1
TABLET ORAL
Qty: 90 TABLET | Refills: 1 | Status: ON HOLD | OUTPATIENT
Start: 2022-03-24 | End: 2022-06-22

## 2022-03-24 RX ORDER — PANTOPRAZOLE SODIUM 40 MG/1
40 TABLET, DELAYED RELEASE ORAL
Qty: 60 TABLET | Refills: 11 | Status: SHIPPED | OUTPATIENT
Start: 2022-03-24 | End: 2023-04-10

## 2022-03-24 RX ORDER — PHENOL 1.4 %
10 AEROSOL, SPRAY (ML) MUCOUS MEMBRANE
Qty: 30 TABLET | Refills: 3 | Status: SHIPPED | OUTPATIENT
Start: 2022-03-24 | End: 2024-02-13

## 2022-03-24 RX ORDER — LEVOTHYROXINE SODIUM 25 UG/1
25 TABLET ORAL DAILY
Qty: 30 TABLET | Refills: 11 | Status: SHIPPED | OUTPATIENT
Start: 2022-03-24 | End: 2023-04-10

## 2022-03-24 RX ORDER — MAGNESIUM GLYCINATE 15 %
300 POWDER (GRAM) MISCELLANEOUS 2 TIMES DAILY
Qty: 18 G | Refills: 11 | Status: SHIPPED | OUTPATIENT
Start: 2022-03-24 | End: 2022-09-26

## 2022-03-24 RX ORDER — AMOXICILLIN 250 MG
2 CAPSULE ORAL 2 TIMES DAILY
Qty: 120 TABLET | Refills: 11 | Status: SHIPPED | OUTPATIENT
Start: 2022-03-24 | End: 2022-09-12

## 2022-03-24 RX ORDER — AMLODIPINE BESYLATE 5 MG/1
10 TABLET ORAL AT BEDTIME
Qty: 60 TABLET | Refills: 11 | Status: SHIPPED | OUTPATIENT
Start: 2022-03-24 | End: 2022-04-12

## 2022-03-24 RX ORDER — ONDANSETRON 4 MG/1
4 TABLET, ORALLY DISINTEGRATING ORAL EVERY 6 HOURS PRN
Qty: 30 TABLET | Refills: 3 | Status: SHIPPED | OUTPATIENT
Start: 2022-03-24

## 2022-03-24 RX ORDER — NYSTATIN 100000/ML
1000000 SUSPENSION, ORAL (FINAL DOSE FORM) ORAL 4 TIMES DAILY
Qty: 1500 ML | Refills: 4 | Status: SHIPPED | OUTPATIENT
Start: 2022-03-24 | End: 2022-09-12

## 2022-03-24 RX ORDER — SULFAMETHOXAZOLE AND TRIMETHOPRIM 400; 80 MG/1; MG/1
1 TABLET ORAL DAILY
Qty: 30 TABLET | Refills: 11 | Status: SHIPPED | OUTPATIENT
Start: 2022-03-24 | End: 2023-04-10

## 2022-03-24 RX ORDER — MYCOPHENOLIC ACID 360 MG/1
720 TABLET, DELAYED RELEASE ORAL 2 TIMES DAILY
Qty: 120 TABLET | Refills: 11 | Status: ON HOLD | OUTPATIENT
Start: 2022-03-24 | End: 2023-03-14

## 2022-03-24 RX ORDER — LEVALBUTEROL TARTRATE 45 UG/1
2 AEROSOL, METERED ORAL EVERY 4 HOURS PRN
Qty: 15 G | Refills: 3 | Status: ON HOLD | OUTPATIENT
Start: 2022-03-24 | End: 2023-03-08

## 2022-03-24 RX ORDER — FLUTICASONE PROPIONATE 50 MCG
1 SPRAY, SUSPENSION (ML) NASAL DAILY
Qty: 16 G | Refills: 11 | Status: SHIPPED | OUTPATIENT
Start: 2022-03-24 | End: 2023-08-01 | Stop reason: ALTCHOICE

## 2022-03-24 RX ORDER — MULTIPLE VITAMINS W/ MINERALS TAB 9MG-400MCG
1 TAB ORAL DAILY
Qty: 30 TABLET | Refills: 11 | Status: SHIPPED | OUTPATIENT
Start: 2022-03-24 | End: 2023-01-18

## 2022-03-24 RX ORDER — WARFARIN SODIUM 2 MG/1
TABLET ORAL
Qty: 90 TABLET | Refills: 1 | Status: ON HOLD | OUTPATIENT
Start: 2022-03-24 | End: 2022-06-22

## 2022-03-24 RX ORDER — ACETAMINOPHEN 325 MG/1
975 TABLET ORAL EVERY 8 HOURS PRN
Qty: 100 TABLET | Refills: 11 | Status: SHIPPED | OUTPATIENT
Start: 2022-03-24

## 2022-03-24 RX ORDER — TACROLIMUS 1 MG/1
3 CAPSULE ORAL 2 TIMES DAILY
Qty: 180 CAPSULE | Refills: 11 | Status: SHIPPED | OUTPATIENT
Start: 2022-03-24 | End: 2022-04-11

## 2022-03-24 RX ORDER — ROSUVASTATIN CALCIUM 10 MG/1
10 TABLET, COATED ORAL DAILY
Qty: 30 TABLET | Refills: 11 | Status: SHIPPED | OUTPATIENT
Start: 2022-03-24 | End: 2023-04-10

## 2022-03-24 RX ORDER — PREDNISONE 5 MG/1
TABLET ORAL
Qty: 150 TABLET | Refills: 3 | Status: SHIPPED | OUTPATIENT
Start: 2022-03-24 | End: 2022-04-18

## 2022-03-25 ENCOUNTER — DOCUMENTATION ONLY (OUTPATIENT)
Dept: ANTICOAGULATION | Facility: CLINIC | Age: 57
End: 2022-03-25
Payer: COMMERCIAL

## 2022-03-25 NOTE — PROGRESS NOTES
ANTICOAGULATION  MANAGEMENT     Interacting Medication Review    Interacting medication(s): Sulfamethoxazole-Trimethoprim (Bactrim) with warfarin.    Duration: Long-term    Indication: S/P Lung Transplant    New medication?: No, long term medication. No affect on INR anticipated at this time.       PLAN     No adjustment to anticoagulation plan needed; no further interaction anticipated with stable long term medication    Patient was not contacted    No adjustment to Anticoagulation Calendar was required    Suni Choi RN

## 2022-03-28 ENCOUNTER — TELEPHONE (OUTPATIENT)
Dept: TRANSPLANT | Facility: CLINIC | Age: 57
End: 2022-03-28

## 2022-03-28 NOTE — TELEPHONE ENCOUNTER
Central Prior Authorization Team   Phone: [phone]      Prior Authorization Retail Medication Request    Medication/Dose: Diclofenac Sodium 1% gel  ICD code (if different than what is on RX):  Rheumatoid arthritis involving both hands, unspecified whether rheumatoid factor present (H) [M06.9]   Previously Tried and Failed:    Rationale:      Insurance Name:  [employer]  Insurance ID:  [id number]    Pharmacy Information (if different than what is on RX)  Name:  [facility] [address] [place], [place] [address] [facility]  Phone:  [phone]

## 2022-03-29 ENCOUNTER — LAB (OUTPATIENT)
Dept: LAB | Facility: CLINIC | Age: 57
End: 2022-03-29
Payer: COMMERCIAL

## 2022-03-29 ENCOUNTER — TELEPHONE (OUTPATIENT)
Dept: ANTICOAGULATION | Facility: CLINIC | Age: 57
End: 2022-03-29
Payer: COMMERCIAL

## 2022-03-29 DIAGNOSIS — Z94.2 S/P LUNG TRANSPLANT (H): ICD-10-CM

## 2022-03-29 PROCEDURE — 80197 ASSAY OF TACROLIMUS: CPT

## 2022-03-29 NOTE — TELEPHONE ENCOUNTER
ANTICOAGULATION     Edson Thornton is overdue for INR check.      spoke with woody, and he said he will make it happen tomorrow.     Dionne Lopez RN

## 2022-03-30 ENCOUNTER — TELEPHONE (OUTPATIENT)
Dept: PHARMACY | Facility: CLINIC | Age: 57
End: 2022-03-30

## 2022-03-30 ENCOUNTER — ANTICOAGULATION THERAPY VISIT (OUTPATIENT)
Dept: ANTICOAGULATION | Facility: CLINIC | Age: 57
End: 2022-03-30

## 2022-03-30 ENCOUNTER — PRE VISIT (OUTPATIENT)
Dept: NEUROLOGY | Facility: CLINIC | Age: 57
End: 2022-03-30
Payer: COMMERCIAL

## 2022-03-30 ENCOUNTER — VIRTUAL VISIT (OUTPATIENT)
Dept: NEUROLOGY | Facility: CLINIC | Age: 57
End: 2022-03-30
Payer: COMMERCIAL

## 2022-03-30 DIAGNOSIS — I48.91 ATRIAL FIBRILLATION, UNSPECIFIED TYPE (H): Primary | ICD-10-CM

## 2022-03-30 DIAGNOSIS — I48.91 ATRIAL FIBRILLATION, UNSPECIFIED TYPE (H): ICD-10-CM

## 2022-03-30 DIAGNOSIS — I63.9 ISCHEMIC STROKE (H): Primary | ICD-10-CM

## 2022-03-30 PROCEDURE — 99203 OFFICE O/P NEW LOW 30 MIN: CPT | Mod: 95 | Performed by: PSYCHIATRY & NEUROLOGY

## 2022-03-30 NOTE — PROGRESS NOTES
Bret is a 56 year old who is being evaluated via a billable video visit.      How would you like to obtain your AVS? MyChart  If the video visit is dropped, the invitation should be resent by: Send to e-mail at: [email or web address]  Will anyone else be joining your video visit? No      Video Start Time: 0755  Video-Visit Details    Type of service:  Video Visit    Video End Time:0805    Originating Location (pt. Location): Home    Distant Location (provider location):  [facility] NEUROLOGY [facility]     Platform used for Video Visit: [employer]      32 minutes spent on the date of the encounter doing chart review, review of test results, interpretation of tests, patient visit and documentation            Return if symptoms worsen or fail to improve.    Perez Albarran MD  [facility] NEUROLOGY [facility]        _______________________________      [facility]    at [facility]  __________________________________________________________    Chief Complaint: No chief complaint on file.      History of Present Illness: Edson Thornton is a 56 year old male presenting for follow-up        Edson Thornton is a 56 year old male patient with history of atrial fibrillation, RA, and ILD s/p lung transplant 10/16/21, who suffered multifocal bilateral embolic infarcts particularly in bilateral occipital cortices, L corona radiata, and R frontal lobe for whom the stroke neurology service is re-involved for reported worsening exam off sedation. Work up is detailed below and included negative EEG and LP as well.     It was thought the mechanism of his stroke to be related to Afib and AC was recommended. Pt is doing fairly well. He was in rehab for 2 weeks and now reports some need of assistance with is activities. He walks independently and is doing all ADLs by himself. He reports issues with is vision and blind spot and is  seeing ophthalmology for it. I did tell  Him that these vision changes are likely due to location of stroke. He reports cognitive changes as well but is currently independent with is ADLs. He continues to take medications without any side effects.          MRI/Head CT MRI Brain (10/26/2021):  Impression:  1. Evolving acute age multifocal acute infarctions in both cerebral  hemispheres and left cerebellum. No new areas of infarction when  compared with prior MRI brain on 10/23/2021.  2. Minimally increased punctate regions of susceptibility effect  within areas of infarction, corresponding to petechial hemorrhage  within previously identified multifocal acute infarctions.  3. Head MRA demonstrates no definite aneurysm or stenosis of the major  intracranial arteries.  4. Neck MRA demonstrates patent major cervical arteries.     MRI Brain (10/23/2021):  Impression:  Multifocal subacute infarcts within both cerebral hemispheres and left cerebellum all of which appear present on prior head CT 10/22/2021.  Minimal petechial hemorrhage associated with the infarct in the left occipital lobe.     Head CT (10/22/2021) : multiple scattered subacute ischemic strokes, predating exam change from 10/22.  May contribute to encephalopathy, but would not explain lack of extremity movement.      Head CT (10/25/2021): This exam is significantly limited by motion and streak artifact. L posterior parietal lobe acute/subacute infarct is identified on current exam w/ remaining previously seen infarct possibly obscured by artifact. No CT evidence for new cortical hypoattentuation to indicate a new acute infarct. No definite acute intracranial hemorrhage or midline shift      Intracranial Vasculature    Head CTA (10/25/2021): No intracranial arterial large vessel occlusion or significant stenosis/occlusion     Head CTA (10/22/2021): demonstrates no aneurysm or stenosis of major intracranial arteries.     CT Perfusion (10/22/2021): focally  "decreased cerebral blood volume and cerebral blood flow in the posterior most left parieto-occipital region with corresponding slight increase in TTP possibly representing an area of hypoperfusion or core infarct.       Cervical Vasculature    CTA Neck (10/22/2021): Mild stenosis in the R vertebral artery w/ severe high-grade stenosis at the L vertebral artery origin     CTA Neck (10/22/2021): No stenosis of major cervical arteries      Echocardiogram TC reported no valvular vegetations and no pulmonary vein thrombosis per primary team.      EKG/Telemetry    Sinus tachycardia   Otherwise normal ECG   When compared with ECG of 21-OCT-2021 06:50,   ST no longer depressed in Anterior leads   ST no longer elevated in Lateral leads       Other Testing    EEG showed no epileptiform discharges x2.  Positive for diffuse encephalopathy x2.       LDL direct 130   A1C  5.3        .    Impression:   Problem List Items Addressed This Visit        Nervous and Auditory    Ischemic stroke (H) - Primary       Circulatory    Atrial fibrillation, unspecified type (H)            Plan:   - Continue to take coumadin for secondary stroke prevention   - Talked and counseled about lifestyle modification that are helpful in particular talked about Mediterranean diet  - Follow up as needed    Stroke Education provided.  He will call us with any questions.  For any acute neurologic deficits he was advised to  go directly to the hospital rather than call the clinic.    Perez Albarran MD  Neurology  03/30/2022 7:45 AM  To page me or covering stroke neurology team member, click here: AMCOM  Choose \"On Call\" tab at top, then search dropdown box for \"Neurology Adult\" & press Enter, look for Neuro ICU/Stroke    ___________________________________________________________________    Current Medications  Current Outpatient Medications   Medication Sig     acetaminophen (TYLENOL) 325 MG tablet 3 tablets (975 mg) by Oral or Feeding Tube route every 8 " hours as needed for mild pain     amLODIPine (NORVASC) 5 MG tablet Take 2 tablets (10 mg) by mouth At Bedtime     calcium carbonate 600 mg-vitamin D 400 units (CALTRATE) 600-400 MG-UNIT per tablet 1 tablet by Oral or Feeding Tube route 2 times daily (with meals)     diclofenac (VOLTAREN) 1 % topical gel Apply 4 g topically 4 times daily Both hands     escitalopram (LEXAPRO) 20 MG tablet Take 1 tablet (20 mg) by mouth daily     fluticasone (FLONASE) 50 MCG/ACT nasal spray Spray 1 spray into both nostrils daily     insulin glargine (LANTUS PEN) 100 UNIT/ML pen Inject 3 Units Subcutaneous every morning     levalbuterol (XOPENEX HFA) 45 MCG/ACT inhaler Inhale 2 puffs into the lungs every 4 hours as needed for wheezing or shortness of breath / dyspnea     levothyroxine (SYNTHROID/LEVOTHROID) 25 MCG tablet Take 1 tablet (25 mcg) by mouth daily     Magnesium Glycinate POWD 300 mg 2 times daily Take 3 tablets (100mg each) two times daily     Melatonin 10 MG TABS tablet Take 1 tablet (10 mg) by mouth nightly as needed for sleep     metoprolol tartrate (LOPRESSOR) 25 MG tablet Take 2 tablets (50 mg) by mouth 2 times daily     multivitamin w/minerals (THERA-VIT-M) tablet Take 1 tablet by mouth daily     MYFORTIC (BRAND) 360 MG EC tablet Take 2 tablets (720 mg) by mouth 2 times daily     nystatin (MYCOSTATIN) 097293 UNIT/ML suspension Swish and swallow 10 mLs (1,000,000 Units) in mouth 4 times daily     ondansetron (ZOFRAN-ODT) 4 MG ODT tab Take 1 tablet (4 mg) by mouth every 6 hours as needed for nausea     pantoprazole (PROTONIX) 40 MG EC tablet Take 1 tablet (40 mg) by mouth 2 times daily (before meals)     predniSONE (DELTASONE) 5 MG tablet Take 7.5 mg in am and 5 mg in afternoon.     rosuvastatin (CRESTOR) 10 MG tablet Take 1 tablet (10 mg) by mouth daily     senna-docusate (SENOKOT-S/PERICOLACE) 8.6-50 MG tablet Take 2 tablets by mouth 2 times daily     sulfamethoxazole-trimethoprim (BACTRIM) 400-80 MG tablet Take 1  tablet by mouth daily     tacrolimus (GENERIC EQUIVALENT) 1 MG capsule Take 3 capsules (3 mg) by mouth 2 times daily Total dose: 3mg in the AM and 3mg in the PM.     warfarin ANTICOAGULANT (COUMADIN) 1 MG tablet Take one to two tablets by mouth daily OR as directed by Anticoagulation Clinic     warfarin ANTICOAGULANT (COUMADIN) 1 MG tablet Take 2 tablets (2 mg) by mouth every evening Further dosing per anticoagulation clinic (Patient taking differently: Take 0.5-1 mg by mouth every evening Further dosing per anticoagulation clinic)     warfarin ANTICOAGULANT (COUMADIN) 2 MG tablet Take 2-3 tablets daily or as directed     No current facility-administered medications for this visit.       Past Medical History  Past Medical History:   Diagnosis Date     BRENNAN (acute kidney injury) (H) 10/17/2021     Anxiety      Depression      HLD (hyperlipidemia)      HTN (hypertension)      Hypothyroidism      ILD (interstitial lung disease) (H)      SALTY on CPAP      Oxygen dependent     BL 4L since ~6/2021     Primary hypertension 2/28/2022     Rheumatoid arthritis (H)     signs ~5/2020, dx 5/2021     S/P lung transplant (H) 10/16/2021     Shock liver 10/17/2021     Steroid-induced hyperglycemia      Traction bronchiectasis (H)        Social History  Social History     Tobacco Use     Smoking status: Never Smoker     Smokeless tobacco: Never Used   Substance Use Topics     Alcohol use: Never     Drug use: Never       Family History  Family History   Problem Relation Age of Onset     Diabetes Type 1 Mother      Heart Disease Mother      Chronic Obstructive Pulmonary Disease Mother      Rheumatoid Arthritis Father      Emphysema Paternal Grandfather        ROS: 10 point relevant ROS neg other than the symptoms noted above in the HPI.    Physical Exam    There were no vitals taken for this visit.    General:  no acute distress  HEENT:  normocephalic/atraumatic  Pulmonary:  no respiratory distress    Neurologic  Mental Status:  alert,  oriented x 3, follows commands, speech clear and fluent, naming and repetition normal  Cranial Nerves:  EOMI with normal smooth pursuit, facial movements symmetric, hearing not formally tested but intact to conversation, no dysarthria  Motor:  no abnormal movements, able to move all limbs antigravity spontaneously with no signs of hemiparesis observed, no pronator drift  Reflexes:  unable to test (telestroke)  Sensory:  unable to test (telestroke)  Station/Gait:  unable to test (telestroke)    Neuroimaging: as per HPI.  Labs:    Recent Labs   Lab Test 03/16/22  1038 03/15/22  0803 02/24/22  0000   INR 2.51* 2.11* 2.0*        Recent Labs   Lab Test 10/22/21  0407 10/21/21  0354 10/19/21  0338 09/07/21  1324   CHOL  --   --   --  214*   HDL  --   --   --  51   LDL  --   --   --  130*   TRIG 307* 486*   < > 364*    < > = values in this interval not displayed.       Recent Labs   Lab Test 11/15/21  0344 09/07/21  0928 09/05/21  1901   A1C 5.3 6.6* 6.5*       No lab results found.

## 2022-03-30 NOTE — LETTER
3/30/2022       RE: Edson Thornton  [address] [place] [address]     Dear Colleague,    Thank you for referring your patient, Edson Thornton, to the [facility]. Please see a copy of my visit note below.    Bret is a 56 year old who is being evaluated via a billable video visit.      How would you like to obtain your AVS? MyChart  If the video visit is dropped, the invitation should be resent by: Send to e-mail at: [email or web address]  Will anyone else be joining your video visit? No      Video Start Time: 0755  Video-Visit Details    Type of service:  Video Visit    Video End Time:0805    Originating Location (pt. Location): Home    Distant Location (provider location):  [facility] NEUROLOGY [facility]     Platform used for Video Visit: [employer]      32 minutes spent on the date of the encounter doing chart review, review of test results, interpretation of tests, patient visit and documentation            Return if symptoms worsen or fail to improve.    Perez Albarran MD  [facility] [place]        _______________________________      [facility]    at [facility]  __________________________________________________________    Chief Complaint: No chief complaint on file.      History of Present Illness: Edson Thornton is a 56 year old male presenting for follow-up        Edson Thornton is a 56 year old male patient with history of atrial fibrillation, RA, and ILD s/p lung transplant 10/16/21, who suffered multifocal bilateral embolic infarcts particularly in bilateral occipital cortices, L corona radiata, and R frontal lobe for whom the stroke neurology service is re-involved for reported worsening exam off sedation. Work up is detailed below and included negative EEG and LP as well.      It was thought the mechanism of his stroke to be related to Afib and AC was recommended. Pt is doing fairly well. He was in rehab for 2 weeks and now reports some need of assistance with is activities. He walks independently and is doing all ADLs by himself. He reports issues with is vision and blind spot and is seeing ophthalmology for it. I did tell  Him that these vision changes are likely due to location of stroke. He reports cognitive changes as well but is currently independent with is ADLs. He continues to take medications without any side effects.          MRI/Head CT MRI Brain (10/26/2021):  Impression:  1. Evolving acute age multifocal acute infarctions in both cerebral  hemispheres and left cerebellum. No new areas of infarction when  compared with prior MRI brain on 10/23/2021.  2. Minimally increased punctate regions of susceptibility effect  within areas of infarction, corresponding to petechial hemorrhage  within previously identified multifocal acute infarctions.  3. Head MRA demonstrates no definite aneurysm or stenosis of the major  intracranial arteries.  4. Neck MRA demonstrates patent major cervical arteries.     MRI Brain (10/23/2021):  Impression:  Multifocal subacute infarcts within both cerebral hemispheres and left cerebellum all of which appear present on prior head CT 10/22/2021.  Minimal petechial hemorrhage associated with the infarct in the left occipital lobe.     Head CT (10/22/2021) : multiple scattered subacute ischemic strokes, predating exam change from 10/22.  May contribute to encephalopathy, but would not explain lack of extremity movement.      Head CT (10/25/2021): This exam is significantly limited by motion and streak artifact. L posterior parietal lobe acute/subacute infarct is identified on current exam w/ remaining previously seen infarct possibly obscured by artifact. No CT evidence for new cortical hypoattentuation to indicate a new acute infarct. No definite  "acute intracranial hemorrhage or midline shift      Intracranial Vasculature    Head CTA (10/25/2021): No intracranial arterial large vessel occlusion or significant stenosis/occlusion     Head CTA (10/22/2021): demonstrates no aneurysm or stenosis of major intracranial arteries.     CT Perfusion (10/22/2021): focally decreased cerebral blood volume and cerebral blood flow in the posterior most left parieto-occipital region with corresponding slight increase in TTP possibly representing an area of hypoperfusion or core infarct.       Cervical Vasculature    CTA Neck (10/22/2021): Mild stenosis in the R vertebral artery w/ severe high-grade stenosis at the L vertebral artery origin     CTA Neck (10/22/2021): No stenosis of major cervical arteries      Echocardiogram TC reported no valvular vegetations and no pulmonary vein thrombosis per primary team.      EKG/Telemetry    Sinus tachycardia   Otherwise normal ECG   When compared with ECG of 21-OCT-2021 06:50,   ST no longer depressed in Anterior leads   ST no longer elevated in Lateral leads       Other Testing    EEG showed no epileptiform discharges x2.  Positive for diffuse encephalopathy x2.       LDL direct 130   A1C  5.3        .    Impression:   Problem List Items Addressed This Visit        Nervous and Auditory    Ischemic stroke (H) - Primary       Circulatory    Atrial fibrillation, unspecified type (H)            Plan:   - Continue to take coumadin for secondary stroke prevention   - Talked and counseled about lifestyle modification that are helpful in particular talked about Mediterranean diet  - Follow up as needed    Stroke Education provided.  He will call us with any questions.  For any acute neurologic deficits he was advised to  go directly to the hospital rather than call the clinic.    Perez Albarran MD  Neurology  03/30/2022 7:45 AM  To page me or covering stroke neurology team member, click here: AMCOM  Choose \"On Call\" tab at top, then search " "dropdown box for \"Neurology Adult\" & press Enter, look for Neuro ICU/Stroke    ___________________________________________________________________    Current Medications  Current Outpatient Medications   Medication Sig     acetaminophen (TYLENOL) 325 MG tablet 3 tablets (975 mg) by Oral or Feeding Tube route every 8 hours as needed for mild pain     amLODIPine (NORVASC) 5 MG tablet Take 2 tablets (10 mg) by mouth At Bedtime     calcium carbonate 600 mg-vitamin D 400 units (CALTRATE) 600-400 MG-UNIT per tablet 1 tablet by Oral or Feeding Tube route 2 times daily (with meals)     diclofenac (VOLTAREN) 1 % topical gel Apply 4 g topically 4 times daily Both hands     escitalopram (LEXAPRO) 20 MG tablet Take 1 tablet (20 mg) by mouth daily     fluticasone (FLONASE) 50 MCG/ACT nasal spray Spray 1 spray into both nostrils daily     insulin glargine (LANTUS PEN) 100 UNIT/ML pen Inject 3 Units Subcutaneous every morning     levalbuterol (XOPENEX HFA) 45 MCG/ACT inhaler Inhale 2 puffs into the lungs every 4 hours as needed for wheezing or shortness of breath / dyspnea     levothyroxine (SYNTHROID/LEVOTHROID) 25 MCG tablet Take 1 tablet (25 mcg) by mouth daily     Magnesium Glycinate POWD 300 mg 2 times daily Take 3 tablets (100mg each) two times daily     Melatonin 10 MG TABS tablet Take 1 tablet (10 mg) by mouth nightly as needed for sleep     metoprolol tartrate (LOPRESSOR) 25 MG tablet Take 2 tablets (50 mg) by mouth 2 times daily     multivitamin w/minerals (THERA-VIT-M) tablet Take 1 tablet by mouth daily     MYFORTIC (BRAND) 360 MG EC tablet Take 2 tablets (720 mg) by mouth 2 times daily     nystatin (MYCOSTATIN) 713505 UNIT/ML suspension Swish and swallow 10 mLs (1,000,000 Units) in mouth 4 times daily     ondansetron (ZOFRAN-ODT) 4 MG ODT tab Take 1 tablet (4 mg) by mouth every 6 hours as needed for nausea     pantoprazole (PROTONIX) 40 MG EC tablet Take 1 tablet (40 mg) by mouth 2 times daily (before meals)     " predniSONE (DELTASONE) 5 MG tablet Take 7.5 mg in am and 5 mg in afternoon.     rosuvastatin (CRESTOR) 10 MG tablet Take 1 tablet (10 mg) by mouth daily     senna-docusate (SENOKOT-S/PERICOLACE) 8.6-50 MG tablet Take 2 tablets by mouth 2 times daily     sulfamethoxazole-trimethoprim (BACTRIM) 400-80 MG tablet Take 1 tablet by mouth daily     tacrolimus (GENERIC EQUIVALENT) 1 MG capsule Take 3 capsules (3 mg) by mouth 2 times daily Total dose: 3mg in the AM and 3mg in the PM.     warfarin ANTICOAGULANT (COUMADIN) 1 MG tablet Take one to two tablets by mouth daily OR as directed by Anticoagulation Clinic     warfarin ANTICOAGULANT (COUMADIN) 1 MG tablet Take 2 tablets (2 mg) by mouth every evening Further dosing per anticoagulation clinic (Patient taking differently: Take 0.5-1 mg by mouth every evening Further dosing per anticoagulation clinic)     warfarin ANTICOAGULANT (COUMADIN) 2 MG tablet Take 2-3 tablets daily or as directed     No current facility-administered medications for this visit.       Past Medical History  Past Medical History:   Diagnosis Date     BRENNAN (acute kidney injury) (H) 10/17/2021     Anxiety      Depression      HLD (hyperlipidemia)      HTN (hypertension)      Hypothyroidism      ILD (interstitial lung disease) (H)      SALTY on CPAP      Oxygen dependent     BL 4L since ~6/2021     Primary hypertension 2/28/2022     Rheumatoid arthritis (H)     signs ~5/2020, dx 5/2021     S/P lung transplant (H) 10/16/2021     Shock liver 10/17/2021     Steroid-induced hyperglycemia      Traction bronchiectasis (H)        Social History  Social History     Tobacco Use     Smoking status: Never Smoker     Smokeless tobacco: Never Used   Substance Use Topics     Alcohol use: Never     Drug use: Never       Family History  Family History   Problem Relation Age of Onset     Diabetes Type 1 Mother      Heart Disease Mother      Chronic Obstructive Pulmonary Disease Mother      Rheumatoid Arthritis Father       Emphysema Paternal Grandfather        ROS: 10 point relevant ROS neg other than the symptoms noted above in the HPI.    Physical Exam    There were no vitals taken for this visit.    General:  no acute distress  HEENT:  normocephalic/atraumatic  Pulmonary:  no respiratory distress    Neurologic  Mental Status:  alert, oriented x 3, follows commands, speech clear and fluent, naming and repetition normal  Cranial Nerves:  EOMI with normal smooth pursuit, facial movements symmetric, hearing not formally tested but intact to conversation, no dysarthria  Motor:  no abnormal movements, able to move all limbs antigravity spontaneously with no signs of hemiparesis observed, no pronator drift  Reflexes:  unable to test (telestroke)  Sensory:  unable to test (telestroke)  Station/Gait:  unable to test (telestroke)    Neuroimaging: as per HPI.  Labs:    Recent Labs   Lab Test 03/16/22  1038 03/15/22  0803 02/24/22  0000   INR 2.51* 2.11* 2.0*        Recent Labs   Lab Test 10/22/21  0407 10/21/21  0354 10/19/21  0338 09/07/21  1324   CHOL  --   --   --  214*   HDL  --   --   --  51   LDL  --   --   --  130*   TRIG 307* 486*   < > 364*    < > = values in this interval not displayed.       Recent Labs   Lab Test 11/15/21  0344 09/07/21  0928 09/05/21  1901   A1C 5.3 6.6* 6.5*       No lab results found.      Bret is a 56 year old who is being evaluated via a billable video visit.      How would you like to obtain your AVS? MyChart  If the video visit is dropped, the invitation should be resent by: Send to e-mail at: [email or web address]  Will anyone else be joining your video visit? No      Video Start Time: 0755  Video-Visit Details    Type of service:  Video Visit    Video End Time:0805    Originating Location (pt. Location): Home    Distant Location (provider location):  [facility] NEUROLOGY [facility]     Platform used for Video Visit: [employer]        Again, thank you for allowing me to participate in the care  of your patient.      Sincerely,    Perez Albarran MD

## 2022-03-30 NOTE — PROGRESS NOTES
Bret is a 56 year old who is being evaluated via a billable video visit.      How would you like to obtain your AVS? MyChart  If the video visit is dropped, the invitation should be resent by: Send to e-mail at: [email or web address]  Will anyone else be joining your video visit? No      Video Start Time: 0755  Video-Visit Details    Type of service:  Video Visit    Video End Time:0805    Originating Location (pt. Location): Home    Distant Location (provider location):  [facility] NEUROLOGY [facility]     Platform used for Video Visit: [employer]

## 2022-03-30 NOTE — LETTER
Date:April 8, 2022      Provider requested that no letter be sent. Do not send.       [facility]

## 2022-03-30 NOTE — TELEPHONE ENCOUNTER
Lung Transplant:  Current immunosuppressants include Mycophenlic acid 720mg twice daily, TAC 3mg twice daily, prednisone 5mg every morning and 5mg every evening.  Pt reports no issues.   Transplant date: 10/16/21  CMV prophylaxis:  Donor (-), Recipient (-), no prophylaxis  PCP prophylaxis: Bactrim S S daily  Thrush prophylaxis:Nystatin 6 months post tx  PPI use: Pantoprazole 40mg twice daily.   Supplements: Doctor's best high absorption magnesium 3 tabs twice daily ( 2 hours from MMF) .  Tx Coordinator: Mady Marin, Using Med Card: Yes  Immunizations: annual flu [id number]; [id number]:  unknown; Prevnar 13: unknown; TDaP:  2018; Shingrix: 2018, 2019, HBV: unknown, COVID: Evusheld 3-4 months ago.     Diarrhea: Patient having 10-12 loose stools daily .Switched to Myfortic about 1.5 weeks ago. Maybe some improvement.     Plan:   1. Start Metamucil once daily, then increase to twice if tolerated. Should help with loose stools.   2. After 4/16 recommend Prevnar 20, or Prevnar 15 and pneumovax 23 8 weeks after. Hepatitis B series after that.  -Engerix-B 20 mcg/mL: Administer 2 mL per dose at 0, 1, 2, and 6 months  -Recombivax HB 40 mcg/mL: Administer 1 mL per dose at 0, 1, and 6 months  3. We recommend COVID vaccine when you are able.

## 2022-03-30 NOTE — PROGRESS NOTES
Addendum 4/6/22 Patient is back in [facility].  Orders are faxed. [facility]              ANTICOAGULATION MANAGEMENT     Edson Thornton 56 year old male is on warfarin with therapeutic INR result. (Goal INR 2.0-3.0)    Recent labs: (last 7 days)     03/30/22  1217   INR 2.26*       ASSESSMENT     Source(s): Chart Review and Patient/Caregiver Call     Warfarin doses taken: Warfarin taken as instructed  Diet: No new diet changes identified  New illness, injury, or hospitalization: No  Medication/supplement changes: None noted  Signs or symptoms of bleeding or clotting: No  Previous INR: Therapeutic last 2(+) visits  Additional findings: None       PLAN     Recommended plan for no diet, medication or health factor changes affecting INR     Dosing Instructions: continue your current warfarin dose with next INR in 1 week       Summary  As of 3/30/2022    Full warfarin instructions:  4 mg every Sun, Thu; 5 mg all other days   Next INR check:  4/6/2022             Telephone call with Bret who verbalizes understanding and agrees to plan and who agrees to plan and repeated back plan correctly    Patient using outside facility for labs    Education provided: Goal range and significance of current result    Plan made per [facility] anticoagulation protocol    Dionne Lopez, RN  Anticoagulation Clinic  3/30/2022    _______________________________________________________________________     Anticoagulation Episode Summary     Current INR goal:  2.0-3.0   TTR:  61.0 % (2.7 mo)   Target end date:  Indefinite   Send INR reminders to:  [facility]    Indications    Atrial fibrillation  unspecified type (H) [I48.91]           Comments:  [facility] P: [phone]  Transplant Labs twice a week  Staying Local: [facility] then back to [place]         Anticoagulation Care Providers     Provider Role Specialty Phone number    Abiodun Woods MD Referring Pulmonary Disease [phone]

## 2022-03-31 ENCOUNTER — DOCUMENTATION ONLY (OUTPATIENT)
Dept: TRANSPLANT | Facility: CLINIC | Age: 57
End: 2022-03-31
Payer: COMMERCIAL

## 2022-03-31 LAB
TACROLIMUS BLD-MCNC: 10.6 UG/L (ref 5–15)
TME LAST DOSE: NORMAL H
TME LAST DOSE: NORMAL H

## 2022-03-31 ASSESSMENT — ENCOUNTER SYMPTOMS: NEW SYMPTOMS OF CORONARY ARTERY DISEASE: 0

## 2022-03-31 NOTE — RESULT ENCOUNTER NOTE
Tacrolimus level 10.6 at 12 hours, on 3/29/2022.  Goal 8-12.   Current dose 3 mg in AM, 3 mg in PM    Level at goal, no change in dose.   [employer] message sent

## 2022-03-31 NOTE — TELEPHONE ENCOUNTER
PA Initiation    Medication: Diclofenac Sodium 1% gel - INITIATED  Insurance Company: [employer] - Phone [phone] Fax [phone]  Pharmacy Filling the Rx: [facility] [address] [place] [address]  Filling Pharmacy Phone: [phone]  Start Date: 3/31/2022

## 2022-04-03 ENCOUNTER — HEALTH MAINTENANCE LETTER (OUTPATIENT)
Age: 57
End: 2022-04-03

## 2022-04-05 ENCOUNTER — VIRTUAL VISIT (OUTPATIENT)
Dept: INFECTIOUS DISEASES | Facility: CLINIC | Age: 57
End: 2022-04-05
Attending: INTERNAL MEDICINE
Payer: COMMERCIAL

## 2022-04-05 ENCOUNTER — TELEPHONE (OUTPATIENT)
Dept: TRANSPLANT | Facility: CLINIC | Age: 57
End: 2022-04-05
Payer: COMMERCIAL

## 2022-04-05 DIAGNOSIS — Z94.2 S/P LUNG TRANSPLANT (H): Primary | ICD-10-CM

## 2022-04-05 DIAGNOSIS — Z94.2 LUNG REPLACED BY TRANSPLANT (H): ICD-10-CM

## 2022-04-05 DIAGNOSIS — J98.4 PNEUMONIA WITH CAVITY OF LUNG: Primary | ICD-10-CM

## 2022-04-05 DIAGNOSIS — J15.0: ICD-10-CM

## 2022-04-05 DIAGNOSIS — D84.9 IMMUNOSUPPRESSED STATUS (H): ICD-10-CM

## 2022-04-05 DIAGNOSIS — Z11.59 ENCOUNTER FOR SCREENING FOR OTHER VIRAL DISEASES: Primary | ICD-10-CM

## 2022-04-05 DIAGNOSIS — B37.7 CANDIDEMIA (H): ICD-10-CM

## 2022-04-05 DIAGNOSIS — J15.1 PNEUMONIA OF LEFT LUNG DUE TO PSEUDOMONAS SPECIES, UNSPECIFIED PART OF LUNG (H): ICD-10-CM

## 2022-04-05 DIAGNOSIS — J86.9 PLEURAL EMPYEMA (H): ICD-10-CM

## 2022-04-05 DIAGNOSIS — Z23 NEED FOR VACCINATION: ICD-10-CM

## 2022-04-05 DIAGNOSIS — J18.9 PNEUMONIA WITH CAVITY OF LUNG: Primary | ICD-10-CM

## 2022-04-05 PROCEDURE — 99214 OFFICE O/P EST MOD 30 MIN: CPT | Mod: 95 | Performed by: INTERNAL MEDICINE

## 2022-04-05 NOTE — PROGRESS NOTES
Bret is a 56 year old who is being evaluated via a billable video visit.      How would you like to obtain your AVS? MyChart  If the video visit is dropped, the invitation should be resent by: Text to cell phone: [phone]   Will anyone else be joining your video visit? No     Video visit started 5:05 PM and ended 5:17 PM  Patient was at home in [place].

## 2022-04-05 NOTE — LETTER
4/5/2022       RE: Edson Thornton  [address]  [place] [address]     Dear Colleague,    Thank you for referring your patient, Edson Thornton, to the [facility] INFECTIOUS DISEASE CLIN[facility] [place] at [facility]. Please see a copy of my visit note below.    Bret is a 56 year old who is being evaluated via a billable video visit.      How would you like to obtain your AVS? MyChart  If the video visit is dropped, the invitation should be resent by: Text to cell phone: [phone]   Will anyone else be joining your video visit? No     Video visit started 5:05 PM and ended 5:17 PM  Patient was at home in [place].       [facility]    Transplant Infectious Diseases Outpatient Progress note      Patient:  Edson Thornton, Date of birth 1965, Medical record number [id number]  Date of Visit:  04/05/2022         Recommendations:   1. May proceed with vaccinations including COVID-19 vaccine.     RTC: as needed.         Summary of Presentation:   Transplants:  10/16/2021 (Lung), Postoperative day:  171     This patient is a 56 year old male with ILD due to RA was on MMF, rituximab and high dose steroids prior to lung transplant. S/p Bi lung transplant. Basiliximab for induction, TAC/MA/prednisone for maintenance.   LLL cavity.   Left pleural empyema with C albicans and candidemia.         Active Problems and Infectious Diseases Issues:   1. LLL cavity.   Aspiration- vs pseudmonal (putida/fluorescens)- vs K pneumonia-induced abscess.   We treated with double coverage ABx (ceftazidime/levofloxacine) for the first few weeks until 12/7/2021 then single coverage with ceftazidime followed by single coverage with levaquin for a total duration of 8 weeks ending 1/19/2022.   CT showed improvement including CT on 2/28/2022 5 weeks after last dose ABx, with persistent improvement.     This is less likely to be fungal infection  including endemic fungi. Recent Cocci Ag in the urine was negative (resided in the past in endemic areas). Also Histoplasma rarely reactivates (donor and recipient from an endemic areas) and the patient is already on fluconazole which is active against both Histoplasma and Cocci. Will check urinary Histo to be on the safe side (serum Histo was negative). CRAG was negative on 10/29/2021.      2. Invasive candidiasis with C albicans.   Left pleural empyema and candidemia.   The candidemia was positive 10/20/2021 and 10/22/2021, first negative blood cx on 10/23/2021.   The left empyema treated with chest tube 10/18/2021 followed by antifungals starting 10/22/2021.   The right loculated effusion was drained 10/27/2021 with exudate but no evidence of empyema.      Received antifungal, lastly fluconazole, for a total of 12 weeks, ending 1/19/2022.   The follow up CT, including CT on 2/28/2022 5 weeks after last dose fluconazole, continue to show decreasing size of loculated left effusion even off of fluconazole.      3. Granuloma on CT chest prior to transplant   This was not seen in the pathology of the explanted lungs also no evidence of mycobacterial or fungal infection of the resected LN.   There would be no further indications to check monthly Cocci Ag in the urine.          Old Problems and Infectious Diseases Issues:   1. Significant travel history throughout [place]; was given ivermectin empirically prior to transplant. Serology was negative but was on ritiximab/MMF/high dose steroids when checked.   2. Indeterminate QuantiFERON while on rituximab/MMF/high dose steroids; was in  and traveled to endemic areas but tubeculin skin test was always negative on multiple occasions prior to becoming immunocompromised. Was not deemed to have LTBI.      Other Infectious Disease issues include:  - QTc 377 as of 12/7/2021.   - PCP prophylaxis: bactrim.   - received evusheld.   - Serostatus: CMV D-/R-, EBV D+/R+, HSV1+/2+,  VZV +, Toxo -/-. Received ACV ppx until 11/15/2021.    - Immunization status: received evusheld, so not a candidate for the COVID-19 vaccine. However, a candidate for the seasonal infulenza vaccine, prevenar followed by pneumovax 8 months later. Received shingrix vaccine but 9 months apart instead of 6 months apart, would revaccinate.    - Gamma globulin status: 365 as of 12/15/2021, received IVIG on 10/21/2021 and 11/18/2021.       Attestation:  Total duration of visit including chart review, reviewing labs and imaging, interviewing and examining the patient, documentation, and sending communication to the patient and to the primary treating team, all at the same day of this encounter, is: 30 minutes.   Ming Abebe MD    Contact information available via [facility] Paging/Directory     04/05/2022         Interim History:   Since I last saw the patient Mycophenolic acid was substituted for MMF in March of 2022 due to diarrhea. Infectious workup was done and was not revealing.   The diarrhea persisted. No weight loss. No fever with diarrhea.   Has chronic cough but mild and stable.   MYERS stable to improving.         HPI:       Transplants:  10/16/2021 (Lung); Postoperative day:  171    This patient is a 56 year old male with ILD due to RA was on MMF, rituximab and high dose steroids prior to lung transplant. S/p Bi lung transplant.   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   Also complicated by candidemia with C albicans on 10/22/2021 in the setting of respiratory samples following transplantation with C albicans. Ophthalmology exam was unremarkable.   micafungin was started on 10/22/2021 and switched to fluconazole on 10/26/2021.   The first negative blood cx were from 10/23/2021.   The patient also underwent left chest tube placement.      On repeat CT chest on 11/22/2021, he was found to have LLL cavity and being treated with antibiotics.       Exposure History in a prior visit:  Was  born in [place], lived in many states including [place] and [facility]. Currently lives in [place] in a house with his girlfriend/fiancee and her son who's fully vaccinated. No pets. He used to work as a  all over the country.   He used to be in the [employer] and was stationed in [place] and [facility]. He also lived in [place].   He was tested for TB by [facility] on many occasions and was negative.   No significant outdoors exposure.   Institutionalized for ETOH use in the past.            Past Medical History:     Past Medical History:   Diagnosis Date     BRENNAN (acute kidney injury) (H) 10/17/2021     Anxiety      Depression      HLD (hyperlipidemia)      HTN (hypertension)      Hypothyroidism      ILD (interstitial lung disease) (H)      SALTY on CPAP      Oxygen dependent     BL 4L since ~6/2021     Primary hypertension 2/28/2022     Rheumatoid arthritis (H)     signs ~5/2020, dx 5/2021     S/P lung transplant (H) 10/16/2021     Shock liver 10/17/2021     Steroid-induced hyperglycemia      Traction bronchiectasis (H)              Past Surgical History:     Past Surgical History:   Procedure Laterality Date     BRONCHOSCOPY (RIGID OR FLEXIBLE), DIAGNOSTIC N/A 1/26/2022    Procedure: BRONCHOSCOPY, WITH BRONCHOALVEOLAR LAVAGE AND BIOPSY;  Surgeon: Perlman, David Morris, MD;  Location:  GI     COLONOSCOPY W/ BIOPSIES AND POLYPECTOMY  07/21/2020     CV CORONARY ANGIOGRAM N/A 09/08/2021    Procedure: Coronary Angiogram with possible intervention;  Surgeon: Jovon Bullock MD;  Location:  HEART CARDIAC CATH LAB     CV RIGHT HEART CATH MEASUREMENTS RECORDED N/A 09/08/2021    Procedure: Right Heart Cath;  Surgeon: Jovon Bullock MD;  Location:  HEART CARDIAC CATH LAB     ESOPHAGOSCOPY, GASTROSCOPY, DUODENOSCOPY (EGD), COMBINED N/A 10/23/2021    Procedure: ESOPHAGOGASTRODUODENOSCOPY (EGD);  Surgeon: Miquel Pisano MD;  Location:  GI     ESOPHAGOSCOPY, GASTROSCOPY, DUODENOSCOPY (EGD),  COMBINED N/A 11/02/2021    Procedure: ESOPHAGOGASTRODUODENOSCOPY (EGD);  Surgeon: Daniel Ortiz MD;  Location: [facility] GI     ESOPHAGOSCOPY, GASTROSCOPY, DUODENOSCOPY (EGD), COMBINED N/A 11/5/2021    Procedure: ESOPHAGOGASTRODUODENOSCOPY (EGD);  Surgeon: Ronnell Hernandez MD;  Location: [facility] GI     EXTRACTION(S) DENTAL N/A 09/22/2021    Procedure: EXTRACTION tooth #19;  Surgeon: Deepak Tobin DDS;  Location: [facility] OR     HERNIA REPAIR       IR CHEST TUBE PLACEMENT NON-TUNNELLED LEFT  11/03/2021     PICC TRIPLE LUMEN PLACEMENT Left 11/04/2021    5FR TL PICC. Right non occlusive thrombus subclavian vein.     REPLACE GASTROJEJUNOSTOMY TUBE, PERCUTANEOUS N/A 11/9/2021    Procedure: REPLACEMENT, GASTROJEJUNOSTOMY TUBE, PERCUTANEOUS;  Surgeon: Hernando Rodriguez MD;  Location: [facility] GI     right acl       TRACHEOSTOMY PERCUTANEOUS N/A 10/29/2021    Procedure: Percutaneous Tracheostomy,;  Surgeon: Celine Jenkins MD;  Location: [facility] OR     TRANSPLANT LUNG RECIPIENT SINGLE X2 Bilateral 10/16/2021    Procedure: TRANSPLANT, LUNG, RECIPIENT, BILATERAL, Bronchoscopy, on-pump perfusion, bilateral clamshell sternotomy;  Surgeon: Yanick Corral MD;  Location: [facility] OR     XR ACROMIOCLAVICULAR JOINT BILATERAL                 Social History:     Social History     Tobacco Use     Smoking status: Never Smoker     Smokeless tobacco: Never Used   Substance Use Topics     Alcohol use: Never             Immunizations:     Immunization History   Administered Date(s) Administered     Influenza Quad, Recombinant, pf(RIV4) (Flublok) 01/19/2022     Tdap (Adacel,Boostrix) 02/15/2018     Zoster vaccine recombinant adjuvanted (SHINGRIX) 10/22/2018, 07/09/2019             Allergies:   No Known Allergies          Medications:     Current Outpatient Medications   Medication Sig     acetaminophen (TYLENOL) 325 MG tablet 3 tablets (975 mg) by Oral or Feeding Tube route every 8 hours as needed for mild pain     amLODIPine (NORVASC) 5 MG  tablet Take 2 tablets (10 mg) by mouth At Bedtime     calcium carbonate 600 mg-vitamin D 400 units (CALTRATE) 600-400 MG-UNIT per tablet 1 tablet by Oral or Feeding Tube route 2 times daily (with meals)     diclofenac (VOLTAREN) 1 % topical gel Apply 4 g topically 4 times daily Both hands     escitalopram (LEXAPRO) 20 MG tablet Take 1 tablet (20 mg) by mouth daily     fluticasone (FLONASE) 50 MCG/ACT nasal spray Spray 1 spray into both nostrils daily     insulin glargine (LANTUS PEN) 100 UNIT/ML pen Inject 3 Units Subcutaneous every morning     levalbuterol (XOPENEX HFA) 45 MCG/ACT inhaler Inhale 2 puffs into the lungs every 4 hours as needed for wheezing or shortness of breath / dyspnea     levothyroxine (SYNTHROID/LEVOTHROID) 25 MCG tablet Take 1 tablet (25 mcg) by mouth daily     Magnesium Glycinate POWD 300 mg 2 times daily Take 3 tablets (100mg each) two times daily     Melatonin 10 MG TABS tablet Take 1 tablet (10 mg) by mouth nightly as needed for sleep     metoprolol tartrate (LOPRESSOR) 25 MG tablet Take 2 tablets (50 mg) by mouth 2 times daily     multivitamin w/minerals (THERA-VIT-M) tablet Take 1 tablet by mouth daily     MYFORTIC (BRAND) 360 MG EC tablet Take 2 tablets (720 mg) by mouth 2 times daily     nystatin (MYCOSTATIN) 155522 UNIT/ML suspension Swish and swallow 10 mLs (1,000,000 Units) in mouth 4 times daily     ondansetron (ZOFRAN-ODT) 4 MG ODT tab Take 1 tablet (4 mg) by mouth every 6 hours as needed for nausea     pantoprazole (PROTONIX) 40 MG EC tablet Take 1 tablet (40 mg) by mouth 2 times daily (before meals)     predniSONE (DELTASONE) 5 MG tablet Take 7.5 mg in am and 5 mg in afternoon.     rosuvastatin (CRESTOR) 10 MG tablet Take 1 tablet (10 mg) by mouth daily     senna-docusate (SENOKOT-S/PERICOLACE) 8.6-50 MG tablet Take 2 tablets by mouth 2 times daily     sulfamethoxazole-trimethoprim (BACTRIM) 400-80 MG tablet Take 1 tablet by mouth daily     tacrolimus (GENERIC EQUIVALENT) 1 MG  capsule Take 3 capsules (3 mg) by mouth 2 times daily Total dose: 3mg in the AM and 3mg in the PM.     warfarin ANTICOAGULANT (COUMADIN) 1 MG tablet Take one to two tablets by mouth daily OR as directed by Anticoagulation Clinic     warfarin ANTICOAGULANT (COUMADIN) 1 MG tablet Take 2 tablets (2 mg) by mouth every evening Further dosing per anticoagulation clinic (Patient taking differently: Take 0.5-1 mg by mouth every evening Further dosing per anticoagulation clinic)     warfarin ANTICOAGULANT (COUMADIN) 2 MG tablet Take 2-3 tablets daily or as directed     No current facility-administered medications for this visit.            Review of Systems:     As mentioned in the interim history otherwise negative by reviewing constitutional symptoms, central and peripheral neurological systems, respiratory system, cardiac system, GI system,  system, musculoskeletal, skin, allergy, and lymphatics.                  Physical Exam:   No vitals are available during this virtual visit.   The patient did not look short of breath or distressed during the video visit.   Constitutional: awake, alert, cooperative, no apparent distress and appears at stated age, well nourished.            Laboratory Data:     No results found for: ACD4    Inflammatory Markers    Recent Labs   Lab Test 10/29/21  0542 10/14/21  0943 10/12/21  1038 10/10/21  1024 10/08/21  0947 10/06/21  1007 10/04/21  0432 10/02/21  0528   SED 87*  --   --   --   --   --   --   --    CRP 53.0* 23.0* 17.0* 30.0* 34.0* 35.0* 23.0* 28.0*       Immune Globulin Studies      Recent Labs   Lab Test 01/03/22  0743 12/30/21  0730 12/15/21  0647 11/17/21  0337 10/15/21  0907 09/07/21  1324 09/07/21  1100   * 446* 365* 198* 265* 363*  363*  --    IGM  --   --   --   --   --  51  --    IGE  --   --   --   --   --   --  83   IGA  --   --   --   --   --  103  --        Metabolic Studies    Recent Labs   Lab Test 03/21/22  0956 03/16/22  1038 03/15/22  0803 03/04/22  1500  03/03/22  1147 03/02/22  1631 02/28/22  0841 02/24/22  1000 01/28/22  0931 01/27/22  1130 12/16/21  1912 12/16/21  1757 12/02/21  2030 12/02/21  1816 11/15/21  0749 11/15/21  0344 10/22/21  1602 10/22/21  1601 10/22/21  0959 10/22/21  0958 09/07/21 2057 09/07/21  1324    142 144 142  --  139 138 141   < > 143   < >  --    < >  --    < > 140  140   < > 147*  --  147*   < > 138   POTASSIUM 4.1 4.3 3.6 3.7  --  3.8 3.9 3.9   < > 3.8   < >  --    < >  --    < > 4.6  4.6   < > 3.6  --  3.5   < > 3.9   CHLORIDE 105 105 108 109  --  106 109 108   < > 109   < >  --    < >  --    < > 106  106   < > 109  --  106   < > 107   CO2 29 28 25 24  --  24 25 29   < > 27   < >  --    < >  --    < > 31  31   < > 31  --  34*   < > 25   ANIONGAP 8 9 11 9  --  9 4 4   < > 7   < >  --    < >  --    < > 3  3   < > 7  --  7   < > 6   BUN 21 16 22 30  --  22 16 21   < > 12   < >  --    < >  --    < > 37*  37*   < > 68*  --  62*   < > 23   CR 1.10 1.19 1.18 0.99  --  1.00 0.93 0.87   < > 0.78   < >  --    < >  --    < > 0.47*  0.47*   < > 1.47*  --  1.54*   < > 0.69   GFRESTIMATED 79 72 72 89  --  88 >90 >90   < > >90   < >  --    < >  --    < > >90  >90   < > 53*  --  50*   < > >90   * 122* 105* 227* 142* 193* 102* 98   < > 108*   < >  --    < >  --    < > 191*  191*   < > 148*   < > 92   < > 151*   A1C  --   --   --   --   --   --   --   --   --   --   --   --   --   --   --  5.3  --   --   --   --   --   --    ERIK 9.6 9.2 9.2 8.9  --  9.8 9.5 9.2   < > 9.5   < >  --    < >  --    < > 8.7  8.7   < > 8.2*  --  7.9*   < > 8.8   PHOS  --   --   --   --   --   --   --  2.6  --  2.5   < >  --    < >  --    < > 3.6   < > 2.8  --   --    < >  --    MAG 1.5* 1.9 1.2*  --   --   --  1.6 1.5*   < > 1.5*   < >  --    < >  --    < > 1.5*   < > 2.1  --   --    < >  --    LACT  --   --   --   --   --   --   --   --   --   --   --  1.6  --  1.7   < >  --    < >  --    < >  --    < >  --    CKT  --   --   --   --   --   --   --   --    --   --   --   --   --   --   --   --   --  51  --  55  --  41    < > = values in this interval not displayed.       Hepatic Studies    Recent Labs   Lab Test 03/02/22  1631 12/23/21  0714 12/12/21  0519 12/10/21  0647 12/08/21  0604 12/07/21  0605 09/30/21  1059 09/19/21  1629   BILITOTAL 0.2 0.2 0.2 0.5 0.2 <0.1*   < >  --    ALKPHOS 69 91 104 105 107 106   < >  --    PROTTOTAL 6.5* 6.0* 5.8* 5.9* 5.7* 5.8*   < >  --    ALBUMIN 3.5 2.8* 2.5* 2.5* 2.3* 2.3*   < >  --    AST 24 22 17 19 17 22   < >  --    ALT 32 31 26 24 25 27   < >  --    LDH  --   --   --   --   --   --   --  423*    < > = values in this interval not displayed.       Hematology Studies     Recent Labs   Lab Test 03/21/22  0956 03/16/22  1038 03/15/22  0803 03/04/22  1500 03/02/22  1631 02/28/22  0841 11/02/21  1302 11/02/21  1053 11/02/21  0832 11/02/21  0815 11/02/21  0556 11/01/21  0818 10/25/21  0326 10/24/21  0410   WBC 8.2 9.2 9.7 9.8 5.0 5.3   < > 57.4*  57.4*   < > 37.5* 32.6* 28.4*   < > 21.4*   ANEU  --   --   --  9.0*  --   --   --  49.9*  --  28.5* 29.7* 25.3*  --  19.9*   ALYM  --   --   --  0.4*  --   --   --  4.0  --  4.9 1.3 1.1  --  0.4*   DONALD  --   --   --  0.4  --   --   --  1.7*  --  2.3* 0.7 1.1  --  0.9   AEOS  --   --   --  0.0  --   --   --  0.0  --  0.0 0.0 0.0  --  0.0   HGB 12.5* 12.1* 12.3* 11.5* 11.4* 12.3*   < > 8.8*   < > 5.8* 8.2* 9.6*   < > 9.8*   HCT 38.7* 38.0* 38.1* 35.9* 35.5* 38.4*   < > 26.8*   < > 19.6* 27.3* 31.4*   < > 30.5*    188 187 221 176 180   < > 244   < > 382 355 308   < > 201    < > = values in this interval not displayed.       Clotting Studies    Recent Labs   Lab Test 03/30/22  1217 03/16/22  1038 03/15/22  0803 02/24/22  0000 11/03/21  0407 11/02/21  1053 11/02/21  0927 11/02/21  0908 11/02/21  0832 10/23/21  0344 10/22/21  1407   INR 2.26* 2.51* 2.11* 2.0*   < > 1.55*  --    < > 1.38*   < > 1.28*   PTT  --   --   --   --   --  34 32  --  50*  --  43*    < > = values in this interval  not displayed.       Urine Studies    Recent Labs   Lab Test 11/01/21  1336 10/25/21  1507 10/22/21  1641 10/17/21  1642 10/17/21  1041   URINEPH 5.5 5.5 7.5* 5.0 5.0   NITRITE Negative Negative Negative Negative Negative   LEUKEST Negative Negative Negative Negative Trace*   WBCU 0 2 3 25* 44*         Microbiology:  Last 6 Culture results with specimen source  No results found for: CULT No results found for: SDES     Last check of C difficile  C Difficile Toxin B by PCR   Date Value Ref Range Status   02/28/2022 Negative Negative Final     Comment:     A negative result does not exclude actual disease due to C. difficile and may be due to improper collection, handling and storage of the specimen or the number of organisms in the specimen is below the detection limit of the assay.         Virology:  CMV viral loads    CMV viral loads    CMV DNA IU/mL   Date Value Ref Range Status   03/21/2022 Not Detected Not Detected IU/mL Final     CMV viral loads    Recent Labs   Lab Test 03/21/22  0956   CMVQNT Not Detected       CMV viral loads    CMV DNA IU/mL   Date Value Ref Range Status   03/21/2022 Not Detected Not Detected IU/mL Final   03/16/2022 Not Detected Not Detected IU/mL Final   03/15/2022 Not Detected Not Detected IU/mL Final   02/28/2022 Not Detected Not Detected IU/mL Final   02/24/2022 Not Detected Not Detected IU/mL Final   02/17/2022 Not Detected Not Detected IU/mL Final   02/10/2022 Not Detected Not Detected IU/mL Final   02/07/2022 Not Detected Not Detected IU/mL Final   02/03/2022 Not Detected Not Detected IU/mL Final       CMV resistance testing  No lab results found.  No results found for: CMVCID, CMVFOS, CMVGAN     EBV viral loads     Recent Labs   Lab Test 03/15/22  0803 02/10/22  1206 02/07/22  0808 01/17/22  0853 01/07/22  0836 12/15/21  0647 11/16/21  0357   EBRES Not Detected Not Detected Not Detected Not Detected Not Detected Not Detected Not Detected     EBV DNA Copies/mL   Date Value Ref Range  Status   03/15/2022 Not Detected Not Detected copies/mL Final   02/10/2022 Not Detected Not Detected copies/mL Final   02/07/2022 Not Detected Not Detected copies/mL Final   01/17/2022 Not Detected Not Detected copies/mL Final   01/07/2022 Not Detected Not Detected copies/mL Final   12/15/2021 Not Detected Not Detected copies/mL Final   11/16/2021 Not Detected Not Detected copies/mL Final       Human Herpes Virus 6 viral loads    No results found for: H6RES No results found for: H6SPEC    CMV Antibody IgG   Date Value Ref Range Status   10/15/2021 No detectable antibody. No detectable antibody.  Final   09/07/2021 No detectable antibody. No detectable antibody.  Final     Toxoplasma Antibody IgG   Date Value Ref Range Status   09/07/2021 <3.0 0.0 - 7.1 IU/mL Final     Comment:     Negative- Absence of detectable Toxoplasma gondii IgG antibodies. A negative result does not rule out acute infection.       Pathology:  Native explanted lungs 10/16/2021  A.  LUNG, LEFT NATIVE, PNEUMONECTOMY:  -Nonspecific interstitial pneumonitis (NSIP), predominant fibrotic pattern, with superimposed organizing pneumonia, reactive pneumocyte type II hyperplasia, intra-alveolar clusters of pigmented macrophages, and areas of end-stage fibrosis.  -Three benign hilar lymph nodes.  B.  LUNG, RIGHT NATIVE, PNEUMONECTOMY:  -Nonspecific interstitial pneumonitis (NSIP), predominant fibrotic pattern, with superimposed organizing pneumonia, reactive pneumocyte type II hyperplasia, intra-alveolar clusters of pigmented macrophages, and areas of end-stage fibrosis.  -Four benign hilar lymph nodes.  C.  Lymph node, level 5, excision:  -Fragments of lymph node, negative for malignancy.      Imaging:  CT chest 2/2022  Impression:   1. Postoperative changes of bilateral lung transplantation with  unchanged sternal dehiscence.  2. Expiratory images reveal lobular air trapping most prominent within  the right lower lobe.  3. Reduced size of loculated  hydropneumothorax within the left lung  base. Reduced left posterior and right basilar effusions.  CT chest 12/20/2021 reviewed by myself.   IMPRESSION:   1. Overall decreased (since 11/22/2021 chest CT) size and number of  bilateral infectious/inflammatory groundglass opacities.  2. Decreased but persistent left lower lobe cavitary lesion.  3. Decreased bilateral pleural loculated effusions.  CT chest 11/22/21   IMPRESSION:   1. New cavitary lesion in the left lung base with multifocal  groundglass opacities some of which are new compared to CT 11/2/2021,  notably in the bilateral upper lobes concerning for ongoing infection.  2. Decreased bilateral loculated pleural effusions.  3. New 1.8 cm fluid collection with surrounding fat stranding in the  left axilla.  4. Similar small pericardial effusion.        Again, thank you for allowing me to participate in the care of your patient.      Sincerely,    Ming Abebe MD

## 2022-04-05 NOTE — LETTER
Date:April 6, 2022      Patient was self referred, no letter generated. Do not send.        [facility] Health Information

## 2022-04-05 NOTE — PROGRESS NOTES
[facility]    Transplant Infectious Diseases Outpatient Progress note      Patient:  Edson Thornton, Date of birth 1965, Medical record number [id number]  Date of Visit:  04/05/2022         Recommendations:   1. May proceed with vaccinations including COVID-19 vaccine.     RTC: as needed.         Summary of Presentation:   Transplants:  10/16/2021 (Lung), Postoperative day:  171     This patient is a 56 year old male with ILD due to RA was on MMF, rituximab and high dose steroids prior to lung transplant. S/p Bi lung transplant. Basiliximab for induction, TAC/MA/prednisone for maintenance.   LLL cavity.   Left pleural empyema with C albicans and candidemia.         Active Problems and Infectious Diseases Issues:   1. LLL cavity.   Aspiration- vs pseudmonal (putida/fluorescens)- vs K pneumonia-induced abscess.   We treated with double coverage ABx (ceftazidime/levofloxacine) for the first few weeks until 12/7/2021 then single coverage with ceftazidime followed by single coverage with levaquin for a total duration of 8 weeks ending 1/19/2022.   CT showed improvement including CT on 2/28/2022 5 weeks after last dose ABx, with persistent improvement.     This is less likely to be fungal infection including endemic fungi. Recent Cocci Ag in the urine was negative (resided in the past in endemic areas). Also Histoplasma rarely reactivates (donor and recipient from an endemic areas) and the patient is already on fluconazole which is active against both Histoplasma and Cocci. Will check urinary Histo to be on the safe side (serum Histo was negative). CRAG was negative on 10/29/2021.      2. Invasive candidiasis with C albicans.   Left pleural empyema and candidemia.   The candidemia was positive 10/20/2021 and 10/22/2021, first negative blood cx on 10/23/2021.   The left empyema treated with chest tube 10/18/2021 followed by antifungals starting 10/22/2021.   The right loculated effusion  was drained 10/27/2021 with exudate but no evidence of empyema.      Received antifungal, lastly fluconazole, for a total of 12 weeks, ending 1/19/2022.   The follow up CT, including CT on 2/28/2022 5 weeks after last dose fluconazole, continue to show decreasing size of loculated left effusion even off of fluconazole.      3. Granuloma on CT chest prior to transplant   This was not seen in the pathology of the explanted lungs also no evidence of mycobacterial or fungal infection of the resected LN.   There would be no further indications to check monthly Cocci Ag in the urine.          Old Problems and Infectious Diseases Issues:   1. Significant travel history throughout [place]; was given ivermectin empirically prior to transplant. Serology was negative but was on ritiximab/MMF/high dose steroids when checked.   2. Indeterminate QuantiFERON while on rituximab/MMF/high dose steroids; was in  and traveled to endemic areas but tubeculin skin test was always negative on multiple occasions prior to becoming immunocompromised. Was not deemed to have LTBI.      Other Infectious Disease issues include:  - QTc 377 as of 12/7/2021.   - PCP prophylaxis: bactrim.   - received evusheld.   - Serostatus: CMV D-/R-, EBV D+/R+, HSV1+/2+, VZV +, Toxo -/-. Received ACV ppx until 11/15/2021.    - Immunization status: received evusheld, so not a candidate for the COVID-19 vaccine. However, a candidate for the seasonal infulenza vaccine, prevenar followed by pneumovax 8 months later. Received shingrix vaccine but 9 months apart instead of 6 months apart, would revaccinate.    - Gamma globulin status: 365 as of 12/15/2021, received IVIG on 10/21/2021 and 11/18/2021.       Attestation:  Total duration of visit including chart review, reviewing labs and imaging, interviewing and examining the patient, documentation, and sending communication to the patient and to the primary treating team, all at the same day of this encounter, is:  30 minutes.   Ming Abebe MD    Contact information available via [facility] Paging/Directory     04/05/2022         Interim History:   Since I last saw the patient Mycophenolic acid was substituted for MMF in March of 2022 due to diarrhea. Infectious workup was done and was not revealing.   The diarrhea persisted. No weight loss. No fever with diarrhea.   Has chronic cough but mild and stable.   MYERS stable to improving.         HPI:       Transplants:  10/16/2021 (Lung); Postoperative day:  171    This patient is a 56 year old male with ILD due to RA was on MMF, rituximab and high dose steroids prior to lung transplant. S/p Bi lung transplant.   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   Also complicated by candidemia with C albicans on 10/22/2021 in the setting of respiratory samples following transplantation with C albicans. Ophthalmology exam was unremarkable.   micafungin was started on 10/22/2021 and switched to fluconazole on 10/26/2021.   The first negative blood cx were from 10/23/2021.   The patient also underwent left chest tube placement.      On repeat CT chest on 11/22/2021, he was found to have LLL cavity and being treated with antibiotics.       Exposure History in a prior visit:  Was born in [place], lived in [place] including [place] [facility] and [facility]. Currently lives in [place] in a house with his girlfriend/fiancee and her son who's fully vaccinated. No pets. He used to work as a  all over the country.   He used to be in the [employer] and [place] [facility] [place] [place] [facility]. He also lived in [place].   He was tested for TB by [facility] on many occasions and was negative.   No significant outdoors exposure.   Institutionalized for ETOH use in the past.            Past Medical History:     Past Medical History:   Diagnosis Date     BRENNAN (acute kidney injury) (H) 10/17/2021     Anxiety      Depression      HLD (hyperlipidemia)      HTN (hypertension)       Hypothyroidism      ILD (interstitial lung disease) (H)      SALTY on CPAP      Oxygen dependent     BL 4L since ~6/2021     Primary hypertension 2/28/2022     Rheumatoid arthritis (H)     signs ~5/2020, dx 5/2021     S/P lung transplant (H) 10/16/2021     Shock liver 10/17/2021     Steroid-induced hyperglycemia      Traction bronchiectasis (H)              Past Surgical History:     Past Surgical History:   Procedure Laterality Date     BRONCHOSCOPY (RIGID OR FLEXIBLE), DIAGNOSTIC N/A 1/26/2022    Procedure: BRONCHOSCOPY, WITH BRONCHOALVEOLAR LAVAGE AND BIOPSY;  Surgeon: Perlman, David Morris, MD;  Location:  GI     COLONOSCOPY W/ BIOPSIES AND POLYPECTOMY  07/21/2020     CV CORONARY ANGIOGRAM N/A 09/08/2021    Procedure: Coronary Angiogram with possible intervention;  Surgeon: Jovon Bullock MD;  Location:  HEART CARDIAC CATH LAB     CV RIGHT HEART CATH MEASUREMENTS RECORDED N/A 09/08/2021    Procedure: Right Heart Cath;  Surgeon: Jovon Bullock MD;  Location:  HEART CARDIAC CATH LAB     ESOPHAGOSCOPY, GASTROSCOPY, DUODENOSCOPY (EGD), COMBINED N/A 10/23/2021    Procedure: ESOPHAGOGASTRODUODENOSCOPY (EGD);  Surgeon: Miquel Pisano MD;  Location:  GI     ESOPHAGOSCOPY, GASTROSCOPY, DUODENOSCOPY (EGD), COMBINED N/A 11/02/2021    Procedure: ESOPHAGOGASTRODUODENOSCOPY (EGD);  Surgeon: Daniel Ortiz MD;  Location:  GI     ESOPHAGOSCOPY, GASTROSCOPY, DUODENOSCOPY (EGD), COMBINED N/A 11/5/2021    Procedure: ESOPHAGOGASTRODUODENOSCOPY (EGD);  Surgeon: Ronnell Hernandez MD;  Location:  GI     EXTRACTION(S) DENTAL N/A 09/22/2021    Procedure: EXTRACTION tooth #19;  Surgeon: Deepak Tobin DDS;  Location:  OR     HERNIA REPAIR       IR CHEST TUBE PLACEMENT NON-TUNNELLED LEFT  11/03/2021     PICC TRIPLE LUMEN PLACEMENT Left 11/04/2021    5FR TL PICC. Right non occlusive thrombus subclavian vein.     REPLACE GASTROJEJUNOSTOMY TUBE, PERCUTANEOUS N/A 11/9/2021    Procedure:  REPLACEMENT, GASTROJEJUNOSTOMY TUBE, PERCUTANEOUS;  Surgeon: Hernando Rodriguez MD;  Location: [facility] GI     right acl       TRACHEOSTOMY PERCUTANEOUS N/A 10/29/2021    Procedure: Percutaneous Tracheostomy,;  Surgeon: Celine Jenkins MD;  Location: [facility] OR     TRANSPLANT LUNG RECIPIENT SINGLE X2 Bilateral 10/16/2021    Procedure: TRANSPLANT, LUNG, RECIPIENT, BILATERAL, Bronchoscopy, on-pump perfusion, bilateral clamshell sternotomy;  Surgeon: Yanick Corral MD;  Location: [facility] OR     XR ACROMIOCLAVICULAR JOINT BILATERAL                 Social History:     Social History     Tobacco Use     Smoking status: Never Smoker     Smokeless tobacco: Never Used   Substance Use Topics     Alcohol use: Never             Immunizations:     Immunization History   Administered Date(s) Administered     Influenza Quad, Recombinant, pf(RIV4) (Flublok) 01/19/2022     Tdap (Adacel,Boostrix) 02/15/2018     Zoster vaccine recombinant adjuvanted (SHINGRIX) 10/22/2018, 07/09/2019             Allergies:   No Known Allergies          Medications:     Current Outpatient Medications   Medication Sig     acetaminophen (TYLENOL) 325 MG tablet 3 tablets (975 mg) by Oral or Feeding Tube route every 8 hours as needed for mild pain     amLODIPine (NORVASC) 5 MG tablet Take 2 tablets (10 mg) by mouth At Bedtime     calcium carbonate 600 mg-vitamin D 400 units (CALTRATE) 600-400 MG-UNIT per tablet 1 tablet by Oral or Feeding Tube route 2 times daily (with meals)     diclofenac (VOLTAREN) 1 % topical gel Apply 4 g topically 4 times daily Both hands     escitalopram (LEXAPRO) 20 MG tablet Take 1 tablet (20 mg) by mouth daily     fluticasone (FLONASE) 50 MCG/ACT nasal spray Spray 1 spray into both nostrils daily     insulin glargine (LANTUS PEN) 100 UNIT/ML pen Inject 3 Units Subcutaneous every morning     levalbuterol (XOPENEX HFA) 45 MCG/ACT inhaler Inhale 2 puffs into the lungs every 4 hours as needed for wheezing or shortness of breath / dyspnea      levothyroxine (SYNTHROID/LEVOTHROID) 25 MCG tablet Take 1 tablet (25 mcg) by mouth daily     Magnesium Glycinate POWD 300 mg 2 times daily Take 3 tablets (100mg each) two times daily     Melatonin 10 MG TABS tablet Take 1 tablet (10 mg) by mouth nightly as needed for sleep     metoprolol tartrate (LOPRESSOR) 25 MG tablet Take 2 tablets (50 mg) by mouth 2 times daily     multivitamin w/minerals (THERA-VIT-M) tablet Take 1 tablet by mouth daily     MYFORTIC (BRAND) 360 MG EC tablet Take 2 tablets (720 mg) by mouth 2 times daily     nystatin (MYCOSTATIN) 218648 UNIT/ML suspension Swish and swallow 10 mLs (1,000,000 Units) in mouth 4 times daily     ondansetron (ZOFRAN-ODT) 4 MG ODT tab Take 1 tablet (4 mg) by mouth every 6 hours as needed for nausea     pantoprazole (PROTONIX) 40 MG EC tablet Take 1 tablet (40 mg) by mouth 2 times daily (before meals)     predniSONE (DELTASONE) 5 MG tablet Take 7.5 mg in am and 5 mg in afternoon.     rosuvastatin (CRESTOR) 10 MG tablet Take 1 tablet (10 mg) by mouth daily     senna-docusate (SENOKOT-S/PERICOLACE) 8.6-50 MG tablet Take 2 tablets by mouth 2 times daily     sulfamethoxazole-trimethoprim (BACTRIM) 400-80 MG tablet Take 1 tablet by mouth daily     tacrolimus (GENERIC EQUIVALENT) 1 MG capsule Take 3 capsules (3 mg) by mouth 2 times daily Total dose: 3mg in the AM and 3mg in the PM.     warfarin ANTICOAGULANT (COUMADIN) 1 MG tablet Take one to two tablets by mouth daily OR as directed by Anticoagulation Clinic     warfarin ANTICOAGULANT (COUMADIN) 1 MG tablet Take 2 tablets (2 mg) by mouth every evening Further dosing per anticoagulation clinic (Patient taking differently: Take 0.5-1 mg by mouth every evening Further dosing per anticoagulation clinic)     warfarin ANTICOAGULANT (COUMADIN) 2 MG tablet Take 2-3 tablets daily or as directed     No current facility-administered medications for this visit.            Review of Systems:     As mentioned in the interim history  otherwise negative by reviewing constitutional symptoms, central and peripheral neurological systems, respiratory system, cardiac system, GI system,  system, musculoskeletal, skin, allergy, and lymphatics.                  Physical Exam:   No vitals are available during this virtual visit.   The patient did not look short of breath or distressed during the video visit.   Constitutional: awake, alert, cooperative, no apparent distress and appears at stated age, well nourished.            Laboratory Data:     No results found for: ACD4    Inflammatory Markers    Recent Labs   Lab Test 10/29/21  0542 10/14/21  0943 10/12/21  1038 10/10/21  1024 10/08/21  0947 10/06/21  1007 10/04/21  0432 10/02/21  0528   SED 87*  --   --   --   --   --   --   --    CRP 53.0* 23.0* 17.0* 30.0* 34.0* 35.0* 23.0* 28.0*       Immune Globulin Studies      Recent Labs   Lab Test 01/03/22  0743 12/30/21  0730 12/15/21  0647 11/17/21  0337 10/15/21  0907 09/07/21  1324 09/07/21  1100   * 446* 365* 198* 265* 363*  363*  --    IGM  --   --   --   --   --  51  --    IGE  --   --   --   --   --   --  83   IGA  --   --   --   --   --  103  --        Metabolic Studies    Recent Labs   Lab Test 03/21/22  0956 03/16/22  1038 03/15/22  0803 03/04/22  1500 03/03/22  1147 03/02/22  1631 02/28/22  0841 02/24/22  1000 01/28/22  0931 01/27/22  1130 12/16/21  1912 12/16/21  1757 12/02/21  2030 12/02/21  1816 11/15/21  0749 11/15/21  0344 10/22/21  1602 10/22/21  1601 10/22/21  0959 10/22/21  0958 09/07/21  2057 09/07/21  1324    142 144 142  --  139 138 141   < > 143   < >  --    < >  --    < > 140  140   < > 147*  --  147*   < > 138   POTASSIUM 4.1 4.3 3.6 3.7  --  3.8 3.9 3.9   < > 3.8   < >  --    < >  --    < > 4.6  4.6   < > 3.6  --  3.5   < > 3.9   CHLORIDE 105 105 108 109  --  106 109 108   < > 109   < >  --    < >  --    < > 106  106   < > 109  --  106   < > 107   CO2 29 28 25 24  --  24 25 29   < > 27   < >  --    < >  --    < >  31  31   < > 31  --  34*   < > 25   ANIONGAP 8 9 11 9  --  9 4 4   < > 7   < >  --    < >  --    < > 3  3   < > 7  --  7   < > 6   BUN 21 16 22 30  --  22 16 21   < > 12   < >  --    < >  --    < > 37*  37*   < > 68*  --  62*   < > 23   CR 1.10 1.19 1.18 0.99  --  1.00 0.93 0.87   < > 0.78   < >  --    < >  --    < > 0.47*  0.47*   < > 1.47*  --  1.54*   < > 0.69   GFRESTIMATED 79 72 72 89  --  88 >90 >90   < > >90   < >  --    < >  --    < > >90  >90   < > 53*  --  50*   < > >90   * 122* 105* 227* 142* 193* 102* 98   < > 108*   < >  --    < >  --    < > 191*  191*   < > 148*   < > 92   < > 151*   A1C  --   --   --   --   --   --   --   --   --   --   --   --   --   --   --  5.3  --   --   --   --   --   --    ERIK 9.6 9.2 9.2 8.9  --  9.8 9.5 9.2   < > 9.5   < >  --    < >  --    < > 8.7  8.7   < > 8.2*  --  7.9*   < > 8.8   PHOS  --   --   --   --   --   --   --  2.6  --  2.5   < >  --    < >  --    < > 3.6   < > 2.8  --   --    < >  --    MAG 1.5* 1.9 1.2*  --   --   --  1.6 1.5*   < > 1.5*   < >  --    < >  --    < > 1.5*   < > 2.1  --   --    < >  --    LACT  --   --   --   --   --   --   --   --   --   --   --  1.6  --  1.7   < >  --    < >  --    < >  --    < >  --    CKT  --   --   --   --   --   --   --   --   --   --   --   --   --   --   --   --   --  51  --  55  --  41    < > = values in this interval not displayed.       Hepatic Studies    Recent Labs   Lab Test 03/02/22  1631 12/23/21  0714 12/12/21  0519 12/10/21  0647 12/08/21  0604 12/07/21  0605 09/30/21  1059 09/19/21  1629   BILITOTAL 0.2 0.2 0.2 0.5 0.2 <0.1*   < >  --    ALKPHOS 69 91 104 105 107 106   < >  --    PROTTOTAL 6.5* 6.0* 5.8* 5.9* 5.7* 5.8*   < >  --    ALBUMIN 3.5 2.8* 2.5* 2.5* 2.3* 2.3*   < >  --    AST 24 22 17 19 17 22   < >  --    ALT 32 31 26 24 25 27   < >  --    LDH  --   --   --   --   --   --   --  423*    < > = values in this interval not displayed.       Hematology Studies     Recent Labs   Lab Test  03/21/22  0956 03/16/22  1038 03/15/22  0803 03/04/22  1500 03/02/22  1631 02/28/22  0841 11/02/21  1302 11/02/21  1053 11/02/21  0832 11/02/21  0815 11/02/21  0556 11/01/21  0818 10/25/21  0326 10/24/21  0410   WBC 8.2 9.2 9.7 9.8 5.0 5.3   < > 57.4*  57.4*   < > 37.5* 32.6* 28.4*   < > 21.4*   ANEU  --   --   --  9.0*  --   --   --  49.9*  --  28.5* 29.7* 25.3*  --  19.9*   ALYM  --   --   --  0.4*  --   --   --  4.0  --  4.9 1.3 1.1  --  0.4*   DONALD  --   --   --  0.4  --   --   --  1.7*  --  2.3* 0.7 1.1  --  0.9   AEOS  --   --   --  0.0  --   --   --  0.0  --  0.0 0.0 0.0  --  0.0   HGB 12.5* 12.1* 12.3* 11.5* 11.4* 12.3*   < > 8.8*   < > 5.8* 8.2* 9.6*   < > 9.8*   HCT 38.7* 38.0* 38.1* 35.9* 35.5* 38.4*   < > 26.8*   < > 19.6* 27.3* 31.4*   < > 30.5*    188 187 221 176 180   < > 244   < > 382 355 308   < > 201    < > = values in this interval not displayed.       Clotting Studies    Recent Labs   Lab Test 03/30/22  1217 03/16/22  1038 03/15/22  0803 02/24/22  0000 11/03/21  0407 11/02/21  1053 11/02/21  0927 11/02/21  0908 11/02/21  0832 10/23/21  0344 10/22/21  1407   INR 2.26* 2.51* 2.11* 2.0*   < > 1.55*  --    < > 1.38*   < > 1.28*   PTT  --   --   --   --   --  34 32  --  50*  --  43*    < > = values in this interval not displayed.       Urine Studies    Recent Labs   Lab Test 11/01/21  1336 10/25/21  1507 10/22/21  1641 10/17/21  1642 10/17/21  1041   URINEPH 5.5 5.5 7.5* 5.0 5.0   NITRITE Negative Negative Negative Negative Negative   LEUKEST Negative Negative Negative Negative Trace*   WBCU 0 2 3 25* 44*         Microbiology:  Last 6 Culture results with specimen source  No results found for: CULT No results found for: SDES     Last check of C difficile  C Difficile Toxin B by PCR   Date Value Ref Range Status   02/28/2022 Negative Negative Final     Comment:     A negative result does not exclude actual disease due to C. difficile and may be due to improper collection, handling and storage  of the specimen or the number of organisms in the specimen is below the detection limit of the assay.         Virology:  CMV viral loads    CMV viral loads    CMV DNA IU/mL   Date Value Ref Range Status   03/21/2022 Not Detected Not Detected IU/mL Final     CMV viral loads    Recent Labs   Lab Test 03/21/22  0956   CMVQNT Not Detected       CMV viral loads    CMV DNA IU/mL   Date Value Ref Range Status   03/21/2022 Not Detected Not Detected IU/mL Final   03/16/2022 Not Detected Not Detected IU/mL Final   03/15/2022 Not Detected Not Detected IU/mL Final   02/28/2022 Not Detected Not Detected IU/mL Final   02/24/2022 Not Detected Not Detected IU/mL Final   02/17/2022 Not Detected Not Detected IU/mL Final   02/10/2022 Not Detected Not Detected IU/mL Final   02/07/2022 Not Detected Not Detected IU/mL Final   02/03/2022 Not Detected Not Detected IU/mL Final       CMV resistance testing  No lab results found.  No results found for: CMVCID, CMVFOS, CMVGAN     EBV viral loads     Recent Labs   Lab Test 03/15/22  0803 02/10/22  1206 02/07/22  0808 01/17/22  0853 01/07/22  0836 12/15/21  0647 11/16/21  0357   EBRES Not Detected Not Detected Not Detected Not Detected Not Detected Not Detected Not Detected     EBV DNA Copies/mL   Date Value Ref Range Status   03/15/2022 Not Detected Not Detected copies/mL Final   02/10/2022 Not Detected Not Detected copies/mL Final   02/07/2022 Not Detected Not Detected copies/mL Final   01/17/2022 Not Detected Not Detected copies/mL Final   01/07/2022 Not Detected Not Detected copies/mL Final   12/15/2021 Not Detected Not Detected copies/mL Final   11/16/2021 Not Detected Not Detected copies/mL Final       Human Herpes Virus 6 viral loads    No results found for: H6RES No results found for: H6SPEC    CMV Antibody IgG   Date Value Ref Range Status   10/15/2021 No detectable antibody. No detectable antibody.  Final   09/07/2021 No detectable antibody. No detectable antibody.  Final      Toxoplasma Antibody IgG   Date Value Ref Range Status   09/07/2021 <3.0 0.0 - 7.1 IU/mL Final     Comment:     Negative- Absence of detectable Toxoplasma gondii IgG antibodies. A negative result does not rule out acute infection.       Pathology:  Native explanted lungs 10/16/2021  A.  LUNG, LEFT NATIVE, PNEUMONECTOMY:  -Nonspecific interstitial pneumonitis (NSIP), predominant fibrotic pattern, with superimposed organizing pneumonia, reactive pneumocyte type II hyperplasia, intra-alveolar clusters of pigmented macrophages, and areas of end-stage fibrosis.  -Three benign hilar lymph nodes.  B.  LUNG, RIGHT NATIVE, PNEUMONECTOMY:  -Nonspecific interstitial pneumonitis (NSIP), predominant fibrotic pattern, with superimposed organizing pneumonia, reactive pneumocyte type II hyperplasia, intra-alveolar clusters of pigmented macrophages, and areas of end-stage fibrosis.  -Four benign hilar lymph nodes.  C.  Lymph node, level 5, excision:  -Fragments of lymph node, negative for malignancy.      Imaging:  CT chest 2/2022  Impression:   1. Postoperative changes of bilateral lung transplantation with  unchanged sternal dehiscence.  2. Expiratory images reveal lobular air trapping most prominent within  the right lower lobe.  3. Reduced size of loculated hydropneumothorax within the left lung  base. Reduced left posterior and right basilar effusions.  CT chest 12/20/2021 reviewed by myself.   IMPRESSION:   1. Overall decreased (since 11/22/2021 chest CT) size and number of  bilateral infectious/inflammatory groundglass opacities.  2. Decreased but persistent left lower lobe cavitary lesion.  3. Decreased bilateral pleural loculated effusions.  CT chest 11/22/21   IMPRESSION:   1. New cavitary lesion in the left lung base with multifocal  groundglass opacities some of which are new compared to CT 11/2/2021,  notably in the bilateral upper lobes concerning for ongoing infection.  2. Decreased bilateral loculated pleural  effusions.  3. New 1.8 cm fluid collection with surrounding fat stranding in the  left axilla.  4. Similar small pericardial effusion.

## 2022-04-05 NOTE — TELEPHONE ENCOUNTER
Patient scheduled for bronchoscopy 4/19 with lavage and biopsies.     Message sent to coumadin clinic to bridge patient to Lovenox.     mychart message sent to patient with plan. Reminded patient to hold Lovenox injections starting 4/18 morning.     Requested message back with questions, concerns, updates

## 2022-04-05 NOTE — TELEPHONE ENCOUNTER
Prior Authorization Approval    Authorization Effective Date: 3/31/2022  Authorization Expiration Date: 3/30/2025  Medication: Diclofenac Sodium 1% gel - APPROVED  Approved Dose/Quantity: 100G PER 7 DAYS  Insurance Company: [employer] - Phone [phone] Fax [phone]  Which Pharmacy is filling the prescription (Not needed for infusion/clinic administered): [facility] [address] [place] [address]  Pharmacy Notified: Yes  Patient Notified: Yes Pharmacy will notify patient once order is ready.

## 2022-04-06 ENCOUNTER — TELEPHONE (OUTPATIENT)
Dept: ANTICOAGULATION | Facility: CLINIC | Age: 57
End: 2022-04-06
Payer: COMMERCIAL

## 2022-04-06 ENCOUNTER — LAB (OUTPATIENT)
Dept: LAB | Facility: CLINIC | Age: 57
End: 2022-04-06
Payer: COMMERCIAL

## 2022-04-06 DIAGNOSIS — Z94.2 S/P LUNG TRANSPLANT (H): ICD-10-CM

## 2022-04-06 DIAGNOSIS — I48.91 ATRIAL FIBRILLATION, UNSPECIFIED TYPE (H): Primary | ICD-10-CM

## 2022-04-06 LAB — INR (EXTERNAL): 3.12 (ref 0.9–1.1)

## 2022-04-06 PROCEDURE — 80197 ASSAY OF TACROLIMUS: CPT

## 2022-04-06 NOTE — TELEPHONE ENCOUNTER
"4/6/22 4/14 Start HOLDING Warfarin (Coumadin)  4/15 Hold Warfarin, FIRST injection Lovenox 70mg starts in PM  4/16 Hold Warfarin, Injection Lovenox 70mg every 12 hours AM & PM  4/17 Hold Warfarin, Injection Lovenox 70mg every 12 hours AM & PM  4/18 Hold Warfarin, FINAL pre-procedure injection Lovenox 70mg AM ONLY  4/19 HOLD Warfarin and HOLD Lovenox injections.    Please ask provider performing Bronchoscopy when it is ok to restart Warfarin and Lovenox.    If ok to restart Warfarin on 4/19 post procedure, please take 10mg x 2 doses, then resume maintenance dose.    If ok to restart Lovenox, injections to start on 4/20 AM & PM every 12 hours.    Test next INR post procedure in one week on April 26th.  Call with questions.    Estimated Creatinine Clearance: 68.3 mL/min (based on SCr of 1.1 mg/dL).    Estimated body mass index is 27.29 kg/m  as calculated from the following:    Height as of 3/1/22: 1.626 m (5' 4\").    Weight as of 3/23/22: 72.1 kg (159 lb).          ANTICOAGULATION MANAGEMENT     Edson Thornton 56 year old male is on warfarin with supratherapeutic INR result. (Goal INR )    Recent labs: (last 7 days)     04/06/22  1121   INR 3.12*       ASSESSMENT       Source(s): Chart Review and Patient/Caregiver Call       Warfarin doses taken: Warfarin taken as instructed    Diet: No new diet changes identified    New illness, injury, or hospitalization: No    Medication/supplement changes: Mycophenolic acid started on 3/24/22 No interaction anticipated    Signs or symptoms of bleeding or clotting: No    Previous INR: Therapeutic last 2(+) visits    Additional findings: Upcoming surgery/procedure 4/19/22 Bronch and Will hold Warfarin for 5 days starting on 4/14, bridging with 70mg Lovenox every 12 hours.       PLAN     Recommended plan for no diet, medication or health factor changes affecting INR     Dosing Instructions: continue your current warfarin dose with next INR in 1 week       Summary  As of 4/6/2022    " Full warfarin instructions:  4/14: Hold; 4/15: Hold; 4/16: Hold; 4/17: Hold; 4/18: Hold; 4/19: 10 mg; 4/20: 10 mg; Otherwise 4 mg every Sun, Thu; 5 mg all other days   Next INR check:  4/13/2022             Telephone call with Bret who agrees to plan and repeated back plan correctly    Patient using outside facility for labs    Education provided: Lovenox/Heparin education provided: prescribed dose and frequency, recommended injection site and site rotation and adjusting syringe/pushing out medication to obtain prescribed dose     Plan made with [facility] Pharmacist Nick Richmond, RN  Anticoagulation Clinic  4/6/2022    _______________________________________________________________________     Anticoagulation Episode Summary     Current INR goal:  2.0-3.0   TTR:  63.0 % (2.9 mo)   Target end date:  Indefinite   Send INR reminders to:  [facility]    Indications    Atrial fibrillation  unspecified type (H) [I48.91]           Comments:  [facility] P: [phone]  Transplant Labs twice a week  Staying Local: [facility] then back to [place]         Anticoagulation Care Providers     Provider Role Specialty Phone number    Abiodun Woods MD Referring Pulmonary Disease [phone]

## 2022-04-08 LAB
TACROLIMUS BLD-MCNC: 13.1 UG/L (ref 5–15)
TME LAST DOSE: NORMAL H
TME LAST DOSE: NORMAL H

## 2022-04-11 ENCOUNTER — TELEPHONE (OUTPATIENT)
Dept: TRANSPLANT | Facility: CLINIC | Age: 57
End: 2022-04-11
Payer: COMMERCIAL

## 2022-04-11 ENCOUNTER — TELEPHONE (OUTPATIENT)
Dept: TRANSPLANT | Facility: CLINIC | Age: 57
End: 2022-04-11

## 2022-04-11 DIAGNOSIS — Z94.2 S/P LUNG TRANSPLANT (H): Chronic | ICD-10-CM

## 2022-04-11 RX ORDER — TACROLIMUS 0.5 MG/1
0.5 CAPSULE ORAL DAILY
Qty: 30 CAPSULE | Refills: 11 | Status: SHIPPED | OUTPATIENT
Start: 2022-04-11 | End: 2022-04-18

## 2022-04-11 RX ORDER — TACROLIMUS 1 MG/1
CAPSULE ORAL
Qty: 150 CAPSULE | Refills: 11 | Status: SHIPPED | OUTPATIENT
Start: 2022-04-11 | End: 2022-04-18

## 2022-04-11 NOTE — TELEPHONE ENCOUNTER
Tacrolimus level 13.1 at 12 hours, on 4/6/2022.  Goal 8-12.   Current dose 3 mg in AM, 3 mg in PM    Dose changed to 3 mg in AM, 2.5 mg in PM   Recheck level in 5-7    Discussed with patient   [place] [employer] message sent     Bronch 4/19 - Plan to bridge to Lovenox per Coumadin clinic.

## 2022-04-12 DIAGNOSIS — I10 HYPERTENSION: Primary | ICD-10-CM

## 2022-04-12 DIAGNOSIS — I10 BENIGN ESSENTIAL HYPERTENSION: ICD-10-CM

## 2022-04-12 RX ORDER — AMLODIPINE BESYLATE 5 MG/1
TABLET ORAL
Qty: 60 TABLET | Refills: 11 | OUTPATIENT
Start: 2022-04-12

## 2022-04-12 RX ORDER — AMLODIPINE BESYLATE 10 MG/1
10 TABLET ORAL AT BEDTIME
Qty: 30 TABLET | Refills: 11 | Status: SHIPPED | OUTPATIENT
Start: 2022-04-12 | End: 2023-04-24

## 2022-04-14 DIAGNOSIS — Z00.6 EXAMINATION OF PARTICIPANT OR CONTROL IN CLINICAL RESEARCH: Primary | ICD-10-CM

## 2022-04-15 NOTE — PROGRESS NOTES
This is a recent snapshot of the patient's [facility] medical record.  For current drug dose and complete information and questions, call [phone] or In Basket pool, [facility] home infusion ([id number])  CSN Number:  [id number]

## 2022-04-18 ENCOUNTER — LAB (OUTPATIENT)
Dept: LAB | Facility: CLINIC | Age: 57
End: 2022-04-18
Attending: INTERNAL MEDICINE
Payer: COMMERCIAL

## 2022-04-18 ENCOUNTER — DOCUMENTATION ONLY (OUTPATIENT)
Dept: ANTICOAGULATION | Facility: CLINIC | Age: 57
End: 2022-04-18

## 2022-04-18 ENCOUNTER — OFFICE VISIT (OUTPATIENT)
Dept: TRANSPLANT | Facility: CLINIC | Age: 57
End: 2022-04-18
Attending: INTERNAL MEDICINE
Payer: COMMERCIAL

## 2022-04-18 ENCOUNTER — ANCILLARY PROCEDURE (OUTPATIENT)
Dept: GENERAL RADIOLOGY | Facility: CLINIC | Age: 57
End: 2022-04-18
Attending: INTERNAL MEDICINE
Payer: COMMERCIAL

## 2022-04-18 VITALS
HEART RATE: 58 BPM | WEIGHT: 158 LBS | OXYGEN SATURATION: 95 % | BODY MASS INDEX: 27.12 KG/M2 | SYSTOLIC BLOOD PRESSURE: 158 MMHG | DIASTOLIC BLOOD PRESSURE: 97 MMHG

## 2022-04-18 DIAGNOSIS — Z94.2 S/P LUNG TRANSPLANT (H): Chronic | ICD-10-CM

## 2022-04-18 DIAGNOSIS — Z94.2 LUNG REPLACED BY TRANSPLANT (H): ICD-10-CM

## 2022-04-18 DIAGNOSIS — I10 BENIGN ESSENTIAL HYPERTENSION: ICD-10-CM

## 2022-04-18 DIAGNOSIS — Z79.899 ENCOUNTER FOR LONG-TERM (CURRENT) USE OF HIGH-RISK MEDICATION: ICD-10-CM

## 2022-04-18 DIAGNOSIS — Z11.59 ENCOUNTER FOR SCREENING FOR OTHER VIRAL DISEASES: ICD-10-CM

## 2022-04-18 DIAGNOSIS — E83.42 HYPOMAGNESEMIA: ICD-10-CM

## 2022-04-18 DIAGNOSIS — I48.91 ATRIAL FIBRILLATION, UNSPECIFIED TYPE (H): ICD-10-CM

## 2022-04-18 DIAGNOSIS — D84.9 IMMUNOSUPPRESSED STATUS (H): ICD-10-CM

## 2022-04-18 DIAGNOSIS — I48.91 ATRIAL FIBRILLATION, UNSPECIFIED TYPE (H): Primary | ICD-10-CM

## 2022-04-18 DIAGNOSIS — Z94.2 LUNG REPLACED BY TRANSPLANT (H): Primary | ICD-10-CM

## 2022-04-18 DIAGNOSIS — Z00.6 EXAMINATION OF PARTICIPANT OR CONTROL IN CLINICAL RESEARCH: ICD-10-CM

## 2022-04-18 DIAGNOSIS — Z94.2 S/P LUNG TRANSPLANT (H): Primary | ICD-10-CM

## 2022-04-18 DIAGNOSIS — Z94.2 S/P LUNG TRANSPLANT (H): ICD-10-CM

## 2022-04-18 DIAGNOSIS — R00.0 TACHYCARDIA: ICD-10-CM

## 2022-04-18 DIAGNOSIS — I10 PRIMARY HYPERTENSION: ICD-10-CM

## 2022-04-18 LAB
ANION GAP SERPL CALCULATED.3IONS-SCNC: 9 MMOL/L (ref 3–14)
BUN SERPL-MCNC: 25 MG/DL (ref 7–30)
CALCIUM SERPL-MCNC: 9.5 MG/DL (ref 8.5–10.1)
CHLORIDE BLD-SCNC: 109 MMOL/L (ref 94–109)
CMV DNA SPEC NAA+PROBE-ACNC: NOT DETECTED IU/ML
CO2 SERPL-SCNC: 26 MMOL/L (ref 20–32)
CREAT SERPL-MCNC: 1.2 MG/DL (ref 0.66–1.25)
ERYTHROCYTE [DISTWIDTH] IN BLOOD BY AUTOMATED COUNT: 15.2 % (ref 10–15)
EXPTIME-PRE: 11.23 SEC
FEF2575-%PRED-PRE: 22 %
FEF2575-PRE: 0.64 L/SEC
FEF2575-PRED: 2.83 L/SEC
FEFMAX-%PRED-PRE: 47 %
FEFMAX-PRE: 3.96 L/SEC
FEFMAX-PRED: 8.37 L/SEC
FEV1-%PRED-PRE: 39 %
FEV1-PRE: 1.24 L
FEV1FEV6-PRE: 64 %
FEV1FEV6-PRED: 80 %
FEV1FVC-PRE: 63 %
FEV1FVC-PRED: 79 %
FIFMAX-PRE: 4.44 L/SEC
FVC-%PRED-PRE: 49 %
FVC-PRE: 1.98 L
FVC-PRED: 3.98 L
GFR SERPL CREATININE-BSD FRML MDRD: 71 ML/MIN/1.73M2
GLUCOSE BLD-MCNC: 96 MG/DL (ref 70–99)
HCT VFR BLD AUTO: 37.4 % (ref 40–53)
HGB BLD-MCNC: 11.7 G/DL (ref 13.3–17.7)
INR PPP: 1.21 (ref 0.85–1.15)
MAGNESIUM SERPL-MCNC: 1.5 MG/DL (ref 1.6–2.3)
MCH RBC QN AUTO: 26.5 PG (ref 26.5–33)
MCHC RBC AUTO-ENTMCNC: 31.3 G/DL (ref 31.5–36.5)
MCV RBC AUTO: 85 FL (ref 78–100)
PLATELET # BLD AUTO: 185 10E3/UL (ref 150–450)
POTASSIUM BLD-SCNC: 4.1 MMOL/L (ref 3.4–5.3)
RBC # BLD AUTO: 4.41 10E6/UL (ref 4.4–5.9)
SARS-COV-2 RNA RESP QL NAA+PROBE: NEGATIVE
SODIUM SERPL-SCNC: 144 MMOL/L (ref 133–144)
TACROLIMUS BLD-MCNC: 10.8 UG/L (ref 5–15)
TME LAST DOSE: NORMAL H
TME LAST DOSE: NORMAL H
WBC # BLD AUTO: 6 10E3/UL (ref 4–11)

## 2022-04-18 PROCEDURE — 71046 X-RAY EXAM CHEST 2 VIEWS: CPT | Mod: GC | Performed by: RADIOLOGY

## 2022-04-18 PROCEDURE — 80048 BASIC METABOLIC PNL TOTAL CA: CPT | Performed by: PATHOLOGY

## 2022-04-18 PROCEDURE — 80197 ASSAY OF TACROLIMUS: CPT | Mod: 90 | Performed by: PATHOLOGY

## 2022-04-18 PROCEDURE — 96372 THER/PROPH/DIAG INJ SC/IM: CPT | Performed by: INTERNAL MEDICINE

## 2022-04-18 PROCEDURE — 85027 COMPLETE CBC AUTOMATED: CPT | Performed by: PATHOLOGY

## 2022-04-18 PROCEDURE — 86833 HLA CLASS II HIGH DEFIN QUAL: CPT | Performed by: PATHOLOGY

## 2022-04-18 PROCEDURE — 99000 SPECIMEN HANDLING OFFICE-LAB: CPT | Performed by: PATHOLOGY

## 2022-04-18 PROCEDURE — U0003 INFECTIOUS AGENT DETECTION BY NUCLEIC ACID (DNA OR RNA); SEVERE ACUTE RESPIRATORY SYNDROME CORONAVIRUS 2 (SARS-COV-2) (CORONAVIRUS DISEASE [COVID-19]), AMPLIFIED PROBE TECHNIQUE, MAKING USE OF HIGH THROUGHPUT TECHNOLOGIES AS DESCRIBED BY CMS-2020-01-R: HCPCS | Mod: 90 | Performed by: PATHOLOGY

## 2022-04-18 PROCEDURE — 83735 ASSAY OF MAGNESIUM: CPT | Performed by: PATHOLOGY

## 2022-04-18 PROCEDURE — 94375 RESPIRATORY FLOW VOLUME LOOP: CPT | Performed by: INTERNAL MEDICINE

## 2022-04-18 PROCEDURE — 99215 OFFICE O/P EST HI 40 MIN: CPT | Mod: 25 | Performed by: INTERNAL MEDICINE

## 2022-04-18 PROCEDURE — 86832 HLA CLASS I HIGH DEFIN QUAL: CPT | Performed by: PATHOLOGY

## 2022-04-18 PROCEDURE — 250N000011 HC RX IP 250 OP 636: Performed by: INTERNAL MEDICINE

## 2022-04-18 PROCEDURE — U0005 INFEC AGEN DETEC AMPLI PROBE: HCPCS | Mod: 90 | Performed by: PATHOLOGY

## 2022-04-18 PROCEDURE — M0220 HC INJECTION TIXAGEVIMAB & CILGAVIMAB (EVUSHELD): HCPCS | Performed by: INTERNAL MEDICINE

## 2022-04-18 PROCEDURE — 36415 COLL VENOUS BLD VENIPUNCTURE: CPT | Performed by: PATHOLOGY

## 2022-04-18 PROCEDURE — 87799 DETECT AGENT NOS DNA QUANT: CPT | Mod: 90 | Performed by: PATHOLOGY

## 2022-04-18 PROCEDURE — 85610 PROTHROMBIN TIME: CPT | Performed by: PATHOLOGY

## 2022-04-18 PROCEDURE — G0463 HOSPITAL OUTPT CLINIC VISIT: HCPCS

## 2022-04-18 RX ORDER — TACROLIMUS 1 MG/1
CAPSULE ORAL
Qty: 150 CAPSULE | Refills: 11 | COMMUNITY
Start: 2022-04-18 | End: 2022-05-09 | Stop reason: DRUGHIGH

## 2022-04-18 RX ORDER — PREDNISONE 5 MG/1
TABLET ORAL
Qty: 150 TABLET | Refills: 3 | COMMUNITY
Start: 2022-04-18 | End: 2022-06-20

## 2022-04-18 RX ORDER — TACROLIMUS 0.5 MG/1
0.5 CAPSULE ORAL DAILY
Qty: 30 CAPSULE | Refills: 11 | COMMUNITY
Start: 2022-04-18 | End: 2022-04-20

## 2022-04-18 RX ORDER — PHYTONADIONE 5 MG/1
5 TABLET ORAL ONCE
Qty: 1 TABLET | Refills: 0 | Status: SHIPPED | OUTPATIENT
Start: 2022-04-18 | End: 2022-04-18

## 2022-04-18 RX ADMIN — Medication 3 ML: at 13:00

## 2022-04-18 NOTE — PROGRESS NOTES
Reason for Visit  Edson Thornton is a 56 year old year old male who is being seen for RECHECK (I month lung tx follow up )      Assessment and plan:   Edson Thornton is a 56 year old male with NSIP/ILD, bronchiectasis, moderate PH, RA, SALTY, chronic HSV infection, hypogammaglobulinemia, steroid-induced diabetes, hypothyroidism, PFO, HTN, HLD, duodenal anomaly, anxiety, and depression. Admitted on 9/5/21 from OSH for acute on chronic respiratory failure 2/2 ILD exacerbation now s/p BSLT on 10/16/21. Prolonged vent wean s/p trach and PEG/J tube.  Post-op course also complicated by encephalopathy and diffuse weakness, acute to subacute CVA, afib with RVR, BRENNAN, GI bleed, Candidemia/Candida empyema, and anxiety. Code called 11/2 for bradycardia/asystole, progressive hypotension, found to have significant GI bleed, EGD with adherent clot near PEG tube site that was clipped.  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    Pulmonary/lung transplant: The patient reports gradual improvement in his exercise tolerance but still fairly limited.  Slight increase in cough and sputum.  Oxygenation is adequate.  PFTs are stable but far lower than would be expected at this point following transplantation.  Is unclear why PFTs remain low.  Previous sniff test showed normal diaphragm function.  The patient is due for his next surveillance biopsy tomorrow.  He will proceed as planned.  (INR is mildly elevated, the patient will be given 5 mg of vitamin K today and INR will be rechecked tomorrow.)  With his new cough and sputum, if any significant organisms grow from lavage, we will initiate a course of antibiotics.  Otherwise he will continue his current immunosuppression.  His last prednisone taper will be due on April 24.  Tacrolimus will be adjusted to maintain a level of 8-12.      Positive  crossmatch: The patient had a retrospective positive crossmatch following transplantation, attributed to rituximab received in June.  Subsequent DSA is positive, initially increasing but now fairly steady and below the usual threshold for treatment.  Date DSA mfi Biopsy (date) Treatment   10/17/2021  none         10/23/2021  none         11/1/2021  none         11/15/2021  none         11/29/2021  DQ B5  2522       12/6/2021  DQ B5  1873       12/12/2021  DQ B5  1703       12/27/2021  DQ B5  3924       1/3/2022  DQ B5  3072       1/10/2022  DQ B5  4032       1/17/2022  DQB5  1849       2/3/2022 DQB5   2488       2/7/2022 DQB5  1874     2/10/2022 DQB5  1781     3/15/2022 DQB5  2254     3/21/2022 DQB5  2117                                Prophylaxis: The patient is CMV -/-.  Continue monitoring.  Bactrim for PJP prophylaxis.    Ophthalmology: Double vision and blind spots.  Continue ophthalmology follow-ups.    Rheumatoid arthritis: Recent rheumatology follow-up suggested that current immunosuppression should be adequate for control of rheumatologic complaints although if joint pains worsen he will follow-up with rheumatology.    Atrial fibrillation with RVR: The patient appears to be in sinus rhythm on exam today.  He has completed his course of amiodarone.  Continue metoprolol.  Continue warfarin.  Follow-up with EP.    Disseminated Candida: Candidemia in October 2021 with candidal empyema.  The patient has completed his course of fluconazole.    Recurrent HSV: Resolved.  If further recurrence may require chronic suppressive therapy.    Hypogammaglobulinemia: Last replaced in November 2021.  Continue monitoring.    Obstructive sleep apnea: Continue CPAP.  Consider follow-up sleep study when fully recovered.    Hypomagnesemia: Magnesium level is low but the patient is already on replacement.  Continue monitoring.    Hypertension: Blood pressure is elevated in clinic but home monitoring suggest blood pressure is  adequately controlled generally 119-130/70s.  Continue current amlodipine and metoprolol.    Depression/anxiety: Adequately controlled with Lexapro.    History of GI bleed: Continue PPI.    Steroid-induced hyperglycemia: Blood sugars adequately controlled with current insulin regimen.    Hypothyroidism: Continue current levothyroxine.    Multifocal acute to subacute ischemic stroke: etiology likely embolic vs perioperative. MRI brain 10/23 with infarcts noted in both cerebral hemispheres, left cerebellum, presumed associated with new Afib vs perioperative. Bilateral upper extremity paresis (R>L), suspicion for some cortical blindness. SBP goal < 140.   - Continue warfarin, HTN and BS control and rosuvastatin      Healthcare maintenance: Patient will receive his second dose of Evusheld today.  COVID vaccination in 6 months.  He is due for additional vaccines.  He will receive the Prevnar 20 with his next visit and Pneumovax 23 8 weeks later.  Then he will receive his hepatitis B vaccine series.    Follow-up in 2 months with labs, x-ray and PFTs and bronchoscopy.    Abiodun GERARD, have spent 44 minutes on the day of the visit to review the chart, interview and examine the patient, review labs and imaging, formulate a plan, document and submit orders. Time documented is excluding time spent for PFT interpretation.     Abiodun Woods MD           Lung TX HPI  Transplants:  10/16/2021 (Lung), Postoperative day:  184    The patient was seen and examined by Abiodun Woods MD   Edson Thornton is a 56 year old male with NSIP/ILD, bronchiectasis, moderate PH, RA, SALTY, chronic HSV infection, hypogammaglobulinemia, steroid-induced diabetes, hypothyroidism, PFO, HTN, HLD, duodenal anomaly, anxiety, and depression. Admitted on 9/5/21 from OSH for acute on chronic respiratory failure 2/2 ILD exacerbation now s/p BSLT on 10/16/21. Prolonged vent wean s/p trach and PEG/J tube.  Post-op course also complicated  by encephalopathy and diffuse weakness, acute to subacute CVA, afib with RVR, BRENNAN, GI bleed, Candidemia/Candida empyema, and anxiety. Code called 11/2 for bradycardia/asystole, progressive hypotension, found to have significant GI bleed, EGD with adherent clot near PEG tube site that was clipped.  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    Breathing is comfortable at rest.  Dyspnea with moderate exertion, slowly improving.  The patient has noted a slight increase in cough and congestion the past few days.  Cough is productive of clear and occasionally yellow sputum.  Occasionally brown streaks.  No hemoptysis.  He reports some soreness over his sternum which has been present since surgery.  Those waxes and wanes.  Not severe enough to require intervention.  No fever or chills.  Mild night sweats and surgery.    Review of systems:  Appetite is good  Clear rhinorrhea.  No postnasal drip  Blind spots and double vision are unchanged, attributed to previous stroke.  No nausea, vomiting or abdominal pain.  Intermittent loose stool, no change with switch from CellCept to Myfortic but some improvement with addition of Metamucil.  Hand joints are stiff and occasionally painful.  A complete ROS was otherwise negative except as noted in the HPI.    Current Outpatient Medications   Medication     phytonadione (MEPHYTON) 5 MG tablet     predniSONE (DELTASONE) 5 MG tablet     tacrolimus (GENERIC EQUIVALENT) 0.5 MG capsule     tacrolimus (GENERIC EQUIVALENT) 1 MG capsule     acetaminophen (TYLENOL) 325 MG tablet     amLODIPine (NORVASC) 10 MG tablet     calcium carbonate 600 mg-vitamin D 400 units (CALTRATE) 600-400 MG-UNIT per tablet     diclofenac (VOLTAREN) 1 % topical gel     enoxaparin ANTICOAGULANT (LOVENOX) 80 MG/0.8ML syringe     escitalopram (LEXAPRO) 20 MG tablet     fluticasone  (FLONASE) 50 MCG/ACT nasal spray     insulin glargine (LANTUS PEN) 100 UNIT/ML pen     levalbuterol (XOPENEX HFA) 45 MCG/ACT inhaler     levothyroxine (SYNTHROID/LEVOTHROID) 25 MCG tablet     Magnesium Glycinate POWD     Melatonin 10 MG TABS tablet     metoprolol tartrate (LOPRESSOR) 25 MG tablet     multivitamin w/minerals (THERA-VIT-M) tablet     MYFORTIC (BRAND) 360 MG EC tablet     nystatin (MYCOSTATIN) 170204 UNIT/ML suspension     ondansetron (ZOFRAN-ODT) 4 MG ODT tab     pantoprazole (PROTONIX) 40 MG EC tablet     rosuvastatin (CRESTOR) 10 MG tablet     senna-docusate (SENOKOT-S/PERICOLACE) 8.6-50 MG tablet     sulfamethoxazole-trimethoprim (BACTRIM) 400-80 MG tablet     warfarin ANTICOAGULANT (COUMADIN) 1 MG tablet     warfarin ANTICOAGULANT (COUMADIN) 1 MG tablet     warfarin ANTICOAGULANT (COUMADIN) 2 MG tablet     Current Facility-Administered Medications   Medication     cilgavimab 150 mg/tixagevimab 150 mg (EVUSHELD) - HALF DOSE *FOR CLINIC USE ONLY*     No Known Allergies  Past Medical History:   Diagnosis Date     BRENNAN (acute kidney injury) (H) 10/17/2021     Anxiety      Depression      HLD (hyperlipidemia)      HTN (hypertension)      Hypothyroidism      ILD (interstitial lung disease) (H)      SALTY on CPAP      Oxygen dependent     BL 4L since ~6/2021     Primary hypertension 2/28/2022     Rheumatoid arthritis (H)     signs ~5/2020, dx 5/2021     S/P lung transplant (H) 10/16/2021     Shock liver 10/17/2021     Steroid-induced hyperglycemia      Traction bronchiectasis (H)        Past Surgical History:   Procedure Laterality Date     BRONCHOSCOPY (RIGID OR FLEXIBLE), DIAGNOSTIC N/A 1/26/2022    Procedure: BRONCHOSCOPY, WITH BRONCHOALVEOLAR LAVAGE AND BIOPSY;  Surgeon: Perlman, David Morris, MD;  Location:  GI     COLONOSCOPY W/ BIOPSIES AND POLYPECTOMY  07/21/2020     CV CORONARY ANGIOGRAM N/A 09/08/2021    Procedure: Coronary Angiogram with possible intervention;  Surgeon: Jovon Bullock,  MD;  Location:  HEART CARDIAC CATH LAB     CV RIGHT HEART CATH MEASUREMENTS RECORDED N/A 09/08/2021    Procedure: Right Heart Cath;  Surgeon: Jovon Bullock MD;  Location:  HEART CARDIAC CATH LAB     ESOPHAGOSCOPY, GASTROSCOPY, DUODENOSCOPY (EGD), COMBINED N/A 10/23/2021    Procedure: ESOPHAGOGASTRODUODENOSCOPY (EGD);  Surgeon: Miquel Pisano MD;  Location:  GI     ESOPHAGOSCOPY, GASTROSCOPY, DUODENOSCOPY (EGD), COMBINED N/A 11/02/2021    Procedure: ESOPHAGOGASTRODUODENOSCOPY (EGD);  Surgeon: Daniel Ortiz MD;  Location:  GI     ESOPHAGOSCOPY, GASTROSCOPY, DUODENOSCOPY (EGD), COMBINED N/A 11/5/2021    Procedure: ESOPHAGOGASTRODUODENOSCOPY (EGD);  Surgeon: Ronnell Hernandez MD;  Location:  GI     EXTRACTION(S) DENTAL N/A 09/22/2021    Procedure: EXTRACTION tooth #19;  Surgeon: Deepak Tobin DDS;  Location: [facility] OR     HERNIA REPAIR       IR CHEST TUBE PLACEMENT NON-TUNNELLED LEFT  11/03/2021     PICC TRIPLE LUMEN PLACEMENT Left 11/04/2021    5FR TL PICC. Right non occlusive thrombus subclavian vein.     REPLACE GASTROJEJUNOSTOMY TUBE, PERCUTANEOUS N/A 11/9/2021    Procedure: REPLACEMENT, GASTROJEJUNOSTOMY TUBE, PERCUTANEOUS;  Surgeon: Hernando Rodriguez MD;  [facility]     right acl       TRACHEOSTOMY PERCUTANEOUS N/A 10/29/2021    Procedure: Percutaneous Tracheostomy,;  Surgeon: Celine Jenkins MD;  Location: [facility] OR     TRANSPLANT LUNG RECIPIENT SINGLE X2 Bilateral 10/16/2021    Procedure: TRANSPLANT, LUNG, RECIPIENT, BILATERAL, Bronchoscopy, on-pump perfusion, bilateral clamshell sternotomy;  Surgeon: Yanick Corral MD;  Location: [facility] OR     XR ACROMIOCLAVICULAR JOINT BILATERAL         Social History     Socioeconomic History     Marital status: Single     Spouse name: Not on file     Number of children: Not on file     Years of education: Not on file     Highest education level: Not on file   Occupational History     Not on file   Tobacco Use     Smoking  status: Never Smoker     Smokeless tobacco: Never Used   Substance and Sexual Activity     Alcohol use: Never     Drug use: Never     Sexual activity: Not on file   Other Topics Concern     Parent/sibling w/ CABG, MI or angioplasty before 65F 55M? Not Asked   Social History Narrative     Not on file     Social Determinants of Health     Financial Resource Strain: Not on file   Food Insecurity: Not on file   Transportation Needs: Not on file   Physical Activity: Not on file   Stress: Not on file   Social Connections: Not on file   Intimate Partner Violence: Not on file   Housing Stability: Not on file         BP (!) 158/97   Pulse 58   Wt 71.7 kg (158 lb)   SpO2 95%   BMI 27.12 kg/m    Body mass index is 27.12 kg/m .  Exam:   GENERAL APPEARANCE: Well developed, well nourished, alert, and in no apparent distress.  EYES: PERRL, EOMI  HENT: Nasal mucosa with no edema and no hyperemia. No nasal polyps.  EARS: Canals clear, TMs normal  MOUTH: Oral mucosa is moist, without any lesions, no tonsillar enlargement, no oropharyngeal exudate.  NECK: supple, no masses, no thyromegaly.  LYMPHATICS: No significant axillary, cervical, or supraclavicular nodes.  RESP: normal percussion, diminished airflow right base, otherwise good air flow throughout.  No crackles. No rhonchi. No wheezes.  CV: Normal S1, S2, regular rhythm, normal rate. No murmur.  No rub. No gallop. No LE edema.   ABDOMEN:  Bowel sounds normal, soft, nontender, no HSM or masses.   MS: extremities normal. (+) clubbing. No cyanosis.  SKIN: no rash on limited exam  NEURO: Mentation intact, speech normal, normal strength and tone, normal gait and stance  PSYCH: mentation appears normal. and affect normal/bright  Results:  Recent Results (from the past 168 hour(s))   Basic metabolic panel    Collection Time: 04/12/22  9:07 AM   Result Value Ref Range    Glucose (External) 99 70 - 99 mg/dL    Urea Nitrogen (External) 25 (H) 6 - 22 mg/dL    Creatinine (External) 1.30  (H) 0.73 - 1.18 mg/dL    BUN/Creatinine Ratio (External) 19.2 10.0 - 25.0    Sodium (External) 142 136 - 145 meq/L    Potassium (External) 4.6 3.5 - 5.1 meq/L    Chloride (External) (External) 108 98 - 109 meq/L    CO2 (External) 24 22 - 31 meq/L    Anion Gap (External) 15 6 - 20 meq/L    Calcium (External) 9.4 8.5 - 10.5 mg/dL    GFR Estimated (External) 57 (L) >=60 mL/min/1.73m2   Magnesium    Collection Time: 04/12/22  9:07 AM   Result Value Ref Range    Magnesium (External) 1.7 1.6 - 2.6 mg/dL   CBC with platelets    Collection Time: 04/12/22  9:07 AM   Result Value Ref Range    WBC Count (External) 8.2 4.0 - 11.0 K/uL    RBC Count (External) 4.54 4.40 - 5.80 M/uL    Hemoglobin (External) 11.9 (L) 13.5 - 17.5 g/dL    Hematocrit (External) 38.8 (L) 40.0 - 50.0 %    MCV (External) 85.5 80.0 - 98.0 fL    MCH (External) 26.2 25.5 - 34.0 Pg    MCHC (External) 30.7 (L) 31.5 - 36.5 g/dL    RDW (External) 15.7 (H) 11.5 - 15.5 %    Platelet Count (External) 240 140 - 400 K/uL   Manual Differential    Collection Time: 04/12/22  9:07 AM   Result Value Ref Range    Method (External) Auto     Absolute Neutrophils (External) 4.9 1.8 - 8.0 K/uL    Absolute Lymphocytes (External) 2.1 0.8 - 4.1 K/uL    Absolute Monocytes (External) 0.9 0.0 - 1.0 K/uL    Absolute Eosinophils (External) 0.1 0.0 - 0.7 K/uL    Absolute Basophils (External) 0.0 0.0 - 0.2 K/uL    Absolute Immature Granulocytes (External) 0.21 (H) 0.00 - 0.06 K/uL    % Neutrophils (External) 58.9 %    % Lymphocytes (External) 25.9 %    % Monocytes (External) 11.3 %    % Immature Granulocytes (External) 2.6 %    % Eosinophils (External) 0.9 %    % Basophils (External) 0.4 %    Nucleated RBCs (External) 0 0 - 1 /100 WBC   Asymptomatic COVID-19 Virus (Coronavirus) by PCR Nose    Collection Time: 04/18/22 10:20 AM    Specimen: Nose; Swab   Result Value Ref Range    SARS CoV2 PCR Negative Negative, Testing sent to reference lab. Results will be returned via unsolicited  result   Basic metabolic panel    Collection Time: 04/18/22 10:23 AM   Result Value Ref Range    Sodium 144 133 - 144 mmol/L    Potassium 4.1 3.4 - 5.3 mmol/L    Chloride 109 94 - 109 mmol/L    Carbon Dioxide (CO2) 26 20 - 32 mmol/L    Anion Gap 9 3 - 14 mmol/L    Urea Nitrogen 25 7 - 30 mg/dL    Creatinine 1.20 0.66 - 1.25 mg/dL    Calcium 9.5 8.5 - 10.1 mg/dL    Glucose 96 70 - 99 mg/dL    GFR Estimate 71 >60 mL/min/1.73m2   Magnesium    Collection Time: 04/18/22 10:23 AM   Result Value Ref Range    Magnesium 1.5 (L) 1.6 - 2.3 mg/dL   CBC with platelets    Collection Time: 04/18/22 10:23 AM   Result Value Ref Range    WBC Count 6.0 4.0 - 11.0 10e3/uL    RBC Count 4.41 4.40 - 5.90 10e6/uL    Hemoglobin 11.7 (L) 13.3 - 17.7 g/dL    Hematocrit 37.4 (L) 40.0 - 53.0 %    MCV 85 78 - 100 fL    MCH 26.5 26.5 - 33.0 pg    MCHC 31.3 (L) 31.5 - 36.5 g/dL    RDW 15.2 (H) 10.0 - 15.0 %    Platelet Count 185 150 - 450 10e3/uL   INR    Collection Time: 04/18/22 10:23 AM   Result Value Ref Range    INR 1.21 (H) 0.85 - 1.15   General PFT Lab (Please always keep checked)    Collection Time: 04/18/22 10:46 AM   Result Value Ref Range    FVC-Pred 3.98 L    FVC-Pre 1.98 L    FVC-%Pred-Pre 49 %    FEV1-Pre 1.24 L    FEV1-%Pred-Pre 39 %    FEV1FVC-Pred 79 %    FEV1FVC-Pre 63 %    FEFMax-Pred 8.37 L/sec    FEFMax-Pre 3.96 L/sec    FEFMax-%Pred-Pre 47 %    FEF2575-Pred 2.83 L/sec    FEF2575-Pre 0.64 L/sec    JAL9076-%Pred-Pre 22 %    ExpTime-Pre 11.23 sec    FIFMax-Pre 4.44 L/sec    FEV1FEV6-Pred 80 %    FEV1FEV6-Pre 64 %                         Results as noted above.    PFT Interpretation:  Severe restrictive ventilatory defect.  Unchanged from previous.   Below recent best.   Valid Maneuver

## 2022-04-18 NOTE — NURSING NOTE
Chief Complaint   Patient presents with     RECHECK     I month lung tx follow up      Blood pressure (!) 158/97, pulse 58, weight 71.7 kg (158 lb), SpO2 95 %.    Autumn Almonte, CMA

## 2022-04-18 NOTE — PROGRESS NOTES
Received INR result of 1.21 today.  Bret is holding warfarin in preparation for a bronchoscopy tomorrow, 4/19/22.  See warfarin holding plan in anticoag encounter dated 4/6/22.  Spoke with Bret.  He has taken his last dose of lovenox pre procedure.  Reminded him to ask the provider who does the bronch when it is safe for him to restart warfarin and lovenox.  Mesha Chau RN

## 2022-04-18 NOTE — PATIENT INSTRUCTIONS
Patient Instructions  1. Continue to hydrate with 60-70 oz fluids daily.  2. Continue to exercise daily or most days of the week.  3. Follow up with your primary care provider for annual gender health maintenance procedures.  4. Follow up with colonoscopy schedule.  5. Follow up with annual dermatology visits.  6. It doesn't seem like the COVID vaccine is working well in lung transplant patients. A number of lung transplant patients have gotten sick with COVID even after receiving the vaccines.  Based on our recent experience, it can be life-threatening to get COVID  even after being vaccinated. Please continue to act like you did not get the COVID vaccine - social distancing, wearing a mask, good hand hygiene, etc. If the people around you are vaccinated, it will help reduce the risk of you getting COVID. All members of your household should be vaccinated.  7. Your last prednisone taper is on 4/24. You should be at 5mg in the AM and 2.5mg in the PM.  8. We are going to give you 5mg of Vitamin K today and recheck your INR tomorrow morning before your bronchoscopy.     Next transplant clinic appointment: 2 months with CXR, labs and PFTs  Next lab draw: pending tacrolimus level, otherwise every 2 weeks         You are scheduled for a bronchoscopy on 4/19/2022 at 9AM at the [facility] on the 3rd floor. Arrive 1 hour early (at 8AM). You will need to get labs before your bronchoscopy - Mady will make it for 8AM.     Instructions     1. Nothing to eat or drink 8 hours before procedure.  2. Hold your morning medications. Bring them with you to take after the procedure.  3. Have a  available to take you home.   4. If you are a diabetic - hold your insulin dose in the morning  5. After your bronch, restart coumadin and Lovenox Wednesday evening until coumadin clinic says you can restart your coumadin.    ~~~~~~~~~~~~~~~~~~~~~~~~~    Thoracic Transplant Office phone [phone],  fax [phone]    Office Hours 8:30 - 5:00     For after-hours urgent issues, please dial [phone], and ask to speak with the Thoracic Transplant Coordinator On-Call.  --------------------  To expedite your medication refill(s), please contact your pharmacy and have them fax a refill request to: [phone]  .   *Please allow 3 business days for routine medication refills.  *Please allow 5 business days for controlled substance medication refills.    **For Diabetic medications and supplies refill(s), please contact your pharmacy and have them contact your Endocrine team.  --------------------  For scheduling appointments call [phone].  --------------------  Please Note: If you are active on [employer], all future test results will be sent by [employer] message only, and will no longer be called to patient. You may also receive communication directly from your physician.

## 2022-04-18 NOTE — NURSING NOTE
EVUSHELD Administration Note:  Edson Thornton presents today for EVUSHELD.   present during visit today: Not Applicable.    Consent:   Informed Consent confirmed in chart, obtained on this date: 4/18/22    Post Injection Assessment:   Patient observed for 60 minutes post adminsitration per protocol.      Patient was observed in the room for a minimum of 10 minutes after injection per standard, then remained in the buidling for a total 60 minute observation period.         Discharge Plan:    Patient and/or family verbalized understanding of discharge instructions and all questions answered.     Autumn Almonte, CMA

## 2022-04-18 NOTE — LETTER
4/18/2022     RE: Edson Thornton  [address]  [place] [address]    Dear Colleague,    Thank you for referring your patient, Edson Thornton, to the [facility]. Please see a copy of my visit note below.    Reason for Visit  Edson Thornton is a 56 year old year old male who is being seen for RECHECK (I month lung tx follow up )      Assessment and plan:   Edson Thornton is a 56 year old male with NSIP/ILD, bronchiectasis, moderate PH, RA, SALTY, chronic HSV infection, hypogammaglobulinemia, steroid-induced diabetes, hypothyroidism, PFO, HTN, HLD, duodenal anomaly, anxiety, and depression. Admitted on 9/5/21 from OSH for acute on chronic respiratory failure 2/2 ILD exacerbation now s/p BSLT on 10/16/21. Prolonged vent wean s/p trach and PEG/J tube.  Post-op course also complicated by encephalopathy and diffuse weakness, acute to subacute CVA, afib with RVR, BRENNAN, GI bleed, Candidemia/Candida empyema, and anxiety. Code called 11/2 for bradycardia/asystole, progressive hypotension, found to have significant GI bleed, EGD with adherent clot near PEG tube site that was clipped.  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    Pulmonary/lung transplant: The patient reports gradual improvement in his exercise tolerance but still fairly limited.  Slight increase in cough and sputum.  Oxygenation is adequate.  PFTs are stable but far lower than would be expected at this point following transplantation.  Is unclear why PFTs remain low.  Previous sniff test showed normal diaphragm function.  The patient is due for his next surveillance biopsy tomorrow.  He will proceed as planned.  (INR is mildly elevated, the patient will be given 5 mg of vitamin K today and INR will be rechecked tomorrow.)  With his new cough and sputum, if any significant organisms grow  from lavage, we will initiate a course of antibiotics.  Otherwise he will continue his current immunosuppression.  His last prednisone taper will be due on April 24.  Tacrolimus will be adjusted to maintain a level of 8-12.      Positive crossmatch: The patient had a retrospective positive crossmatch following transplantation, attributed to rituximab received in June.  Subsequent DSA is positive, initially increasing but now fairly steady and below the usual threshold for treatment.  Date DSA mfi Biopsy (date) Treatment   10/17/2021  none         10/23/2021  none         11/1/2021  none         11/15/2021  none         11/29/2021  DQ B5  2522       12/6/2021  DQ B5  1873       12/12/2021  DQ B5  1703       12/27/2021  DQ B5  3924       1/3/2022  DQ B5  3072       1/10/2022  DQ B5  4032       1/17/2022  DQB5  1849       2/3/2022 DQB5   2488       2/7/2022 DQB5  1874     2/10/2022 DQB5  1781     3/15/2022 DQB5  2254     3/21/2022 DQB5  2117                                Prophylaxis: The patient is CMV -/-.  Continue monitoring.  Bactrim for PJP prophylaxis.    Ophthalmology: Double vision and blind spots.  Continue ophthalmology follow-ups.    Rheumatoid arthritis: Recent rheumatology follow-up suggested that current immunosuppression should be adequate for control of rheumatologic complaints although if joint pains worsen he will follow-up with rheumatology.    Atrial fibrillation with RVR: The patient appears to be in sinus rhythm on exam today.  He has completed his course of amiodarone.  Continue metoprolol.  Continue warfarin.  Follow-up with EP.    Disseminated Candida: Candidemia in October 2021 with candidal empyema.  The patient has completed his course of fluconazole.    Recurrent HSV: Resolved.  If further recurrence may require chronic suppressive therapy.    Hypogammaglobulinemia: Last replaced in November 2021.  Continue monitoring.    Obstructive sleep apnea: Continue CPAP.  Consider follow-up sleep  study when fully recovered.    Hypomagnesemia: Magnesium level is low but the patient is already on replacement.  Continue monitoring.    Hypertension: Blood pressure is elevated in clinic but home monitoring suggest blood pressure is adequately controlled generally 119-130/70s.  Continue current amlodipine and metoprolol.    Depression/anxiety: Adequately controlled with Lexapro.    History of GI bleed: Continue PPI.    Steroid-induced hyperglycemia: Blood sugars adequately controlled with current insulin regimen.    Hypothyroidism: Continue current levothyroxine.    Multifocal acute to subacute ischemic stroke: etiology likely embolic vs perioperative. MRI brain 10/23 with infarcts noted in both cerebral hemispheres, left cerebellum, presumed associated with new Afib vs perioperative. Bilateral upper extremity paresis (R>L), suspicion for some cortical blindness. SBP goal < 140.   - Continue warfarin, HTN and BS control and rosuvastatin      Healthcare maintenance: Patient will receive his second dose of Evusheld today.  COVID vaccination in 6 months.  He is due for additional vaccines.  He will receive the Prevnar 20 with his next visit and Pneumovax 23 8 weeks later.  Then he will receive his hepatitis B vaccine series.    Follow-up in 2 months with labs, x-ray and PFTs and bronchoscopy.    IAbiodun, have spent 44 minutes on the day of the visit to review the chart, interview and examine the patient, review labs and imaging, formulate a plan, document and submit orders. Time documented is excluding time spent for PFT interpretation.     Abiodun Woods MD           Lung TX HPI  Transplants:  10/16/2021 (Lung), Postoperative day:  184    The patient was seen and examined by Abiodun Woods MD   Edson Thornton is a 56 year old male with NSIP/ILD, bronchiectasis, moderate PH, RA, SALTY, chronic HSV infection, hypogammaglobulinemia, steroid-induced diabetes, hypothyroidism, PFO, HTN,  HLD, duodenal anomaly, anxiety, and depression. Admitted on 9/5/21 from OSH for acute on chronic respiratory failure 2/2 ILD exacerbation now s/p BSLT on 10/16/21. Prolonged vent wean s/p trach and PEG/J tube.  Post-op course also complicated by encephalopathy and diffuse weakness, acute to subacute CVA, afib with RVR, BRENNAN, GI bleed, Candidemia/Candida empyema, and anxiety. Code called 11/2 for bradycardia/asystole, progressive hypotension, found to have significant GI bleed, EGD with adherent clot near PEG tube site that was clipped.  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    Breathing is comfortable at rest.  Dyspnea with moderate exertion, slowly improving.  The patient has noted a slight increase in cough and congestion the past few days.  Cough is productive of clear and occasionally yellow sputum.  Occasionally brown streaks.  No hemoptysis.  He reports some soreness over his sternum which has been present since surgery.  Those waxes and wanes.  Not severe enough to require intervention.  No fever or chills.  Mild night sweats and surgery.    Review of systems:  Appetite is good  Clear rhinorrhea.  No postnasal drip  Blind spots and double vision are unchanged, attributed to previous stroke.  No nausea, vomiting or abdominal pain.  Intermittent loose stool, no change with switch from CellCept to Myfortic but some improvement with addition of Metamucil.  Hand joints are stiff and occasionally painful.  A complete ROS was otherwise negative except as noted in the HPI.    Current Outpatient Medications   Medication     phytonadione (MEPHYTON) 5 MG tablet     predniSONE (DELTASONE) 5 MG tablet     tacrolimus (GENERIC EQUIVALENT) 0.5 MG capsule     tacrolimus (GENERIC EQUIVALENT) 1 MG capsule     acetaminophen (TYLENOL) 325 MG tablet     amLODIPine (NORVASC) 10 MG tablet      calcium carbonate 600 mg-vitamin D 400 units (CALTRATE) 600-400 MG-UNIT per tablet     diclofenac (VOLTAREN) 1 % topical gel     enoxaparin ANTICOAGULANT (LOVENOX) 80 MG/0.8ML syringe     escitalopram (LEXAPRO) 20 MG tablet     fluticasone (FLONASE) 50 MCG/ACT nasal spray     insulin glargine (LANTUS PEN) 100 UNIT/ML pen     levalbuterol (XOPENEX HFA) 45 MCG/ACT inhaler     levothyroxine (SYNTHROID/LEVOTHROID) 25 MCG tablet     Magnesium Glycinate POWD     Melatonin 10 MG TABS tablet     metoprolol tartrate (LOPRESSOR) 25 MG tablet     multivitamin w/minerals (THERA-VIT-M) tablet     MYFORTIC (BRAND) 360 MG EC tablet     nystatin (MYCOSTATIN) 651148 UNIT/ML suspension     ondansetron (ZOFRAN-ODT) 4 MG ODT tab     pantoprazole (PROTONIX) 40 MG EC tablet     rosuvastatin (CRESTOR) 10 MG tablet     senna-docusate (SENOKOT-S/PERICOLACE) 8.6-50 MG tablet     sulfamethoxazole-trimethoprim (BACTRIM) 400-80 MG tablet     warfarin ANTICOAGULANT (COUMADIN) 1 MG tablet     warfarin ANTICOAGULANT (COUMADIN) 1 MG tablet     warfarin ANTICOAGULANT (COUMADIN) 2 MG tablet     Current Facility-Administered Medications   Medication     cilgavimab 150 mg/tixagevimab 150 mg (EVUSHELD) - HALF DOSE *FOR CLINIC USE ONLY*     No Known Allergies  Past Medical History:   Diagnosis Date     BRENNAN (acute kidney injury) (H) 10/17/2021     Anxiety      Depression      HLD (hyperlipidemia)      HTN (hypertension)      Hypothyroidism      ILD (interstitial lung disease) (H)      SALTY on CPAP      Oxygen dependent     BL 4L since ~6/2021     Primary hypertension 2/28/2022     Rheumatoid arthritis (H)     signs ~5/2020, dx 5/2021     S/P lung transplant (H) 10/16/2021     Shock liver 10/17/2021     Steroid-induced hyperglycemia      Traction bronchiectasis (H)        Past Surgical History:   Procedure Laterality Date     BRONCHOSCOPY (RIGID OR FLEXIBLE), DIAGNOSTIC N/A 1/26/2022    Procedure: BRONCHOSCOPY, WITH BRONCHOALVEOLAR LAVAGE AND BIOPSY;   Surgeon: Perlman, David Morris, MD;  Location:  GI     COLONOSCOPY W/ BIOPSIES AND POLYPECTOMY  07/21/2020     CV CORONARY ANGIOGRAM N/A 09/08/2021    Procedure: Coronary Angiogram with possible intervention;  Surgeon: Jovon Bullock MD;  Location:  HEART CARDIAC CATH LAB     CV RIGHT HEART CATH MEASUREMENTS RECORDED N/A 09/08/2021    Procedure: Right Heart Cath;  Surgeon: Jovon Bullock MD;  Location:  HEART CARDIAC CATH LAB     ESOPHAGOSCOPY, GASTROSCOPY, DUODENOSCOPY (EGD), COMBINED N/A 10/23/2021    Procedure: ESOPHAGOGASTRODUODENOSCOPY (EGD);  Surgeon: Miquel Pisano MD;  Location:  GI     ESOPHAGOSCOPY, GASTROSCOPY, DUODENOSCOPY (EGD), COMBINED N/A 11/02/2021    Procedure: ESOPHAGOGASTRODUODENOSCOPY (EGD);  Surgeon: Daniel Ortiz MD;  Location:  GI     ESOPHAGOSCOPY, GASTROSCOPY, DUODENOSCOPY (EGD), COMBINED N/A 11/5/2021    Procedure: ESOPHAGOGASTRODUODENOSCOPY (EGD);  Surgeon: Ronnell Hernandez MD;  Location:  GI     EXTRACTION(S) DENTAL N/A 09/22/2021    Procedure: EXTRACTION tooth #19;  Surgeon: Deepak Tobin DDS;  Location: [facility] OR     HERNIA REPAIR       IR CHEST TUBE PLACEMENT NON-TUNNELLED LEFT  11/03/2021     PICC TRIPLE LUMEN PLACEMENT Left 11/04/2021    5FR TL PICC. Right non occlusive thrombus subclavian vein.     REPLACE GASTROJEJUNOSTOMY TUBE, PERCUTANEOUS N/A 11/9/2021    Procedure: REPLACEMENT, GASTROJEJUNOSTOMY TUBE, PERCUTANEOUS;  Surgeon: Hernando Rodriguez MD;  [facility]     right acl       TRACHEOSTOMY PERCUTANEOUS N/A 10/29/2021    Procedure: Percutaneous Tracheostomy,;  Surgeon: Celine Jenkins MD;  Location: [facility] OR     TRANSPLANT LUNG RECIPIENT SINGLE X2 Bilateral 10/16/2021    Procedure: TRANSPLANT, LUNG, RECIPIENT, BILATERAL, Bronchoscopy, on-pump perfusion, bilateral clamshell sternotomy;  Surgeon: Yanick Corral MD;  Location: [facility] OR     XR ACROMIOCLAVICULAR JOINT BILATERAL         Social History      Socioeconomic History     Marital status: Single     Spouse name: Not on file     Number of children: Not on file     Years of education: Not on file     Highest education level: Not on file   Occupational History     Not on file   Tobacco Use     Smoking status: Never Smoker     Smokeless tobacco: Never Used   Substance and Sexual Activity     Alcohol use: Never     Drug use: Never     Sexual activity: Not on file   Other Topics Concern     Parent/sibling w/ CABG, MI or angioplasty before 65F 55M? Not Asked   Social History Narrative     Not on file     Social Determinants of Health     Financial Resource Strain: Not on file   Food Insecurity: Not on file   Transportation Needs: Not on file   Physical Activity: Not on file   Stress: Not on file   Social Connections: Not on file   Intimate Partner Violence: Not on file   Housing Stability: Not on file         BP (!) 158/97   Pulse 58   Wt 71.7 kg (158 lb)   SpO2 95%   BMI 27.12 kg/m    Body mass index is 27.12 kg/m .  Exam:   GENERAL APPEARANCE: Well developed, well nourished, alert, and in no apparent distress.  EYES: PERRL, EOMI  HENT: Nasal mucosa with no edema and no hyperemia. No nasal polyps.  EARS: Canals clear, TMs normal  MOUTH: Oral mucosa is moist, without any lesions, no tonsillar enlargement, no oropharyngeal exudate.  NECK: supple, no masses, no thyromegaly.  LYMPHATICS: No significant axillary, cervical, or supraclavicular nodes.  RESP: normal percussion, diminished airflow right base, otherwise good air flow throughout.  No crackles. No rhonchi. No wheezes.  CV: Normal S1, S2, regular rhythm, normal rate. No murmur.  No rub. No gallop. No LE edema.   ABDOMEN:  Bowel sounds normal, soft, nontender, no HSM or masses.   MS: extremities normal. (+) clubbing. No cyanosis.  SKIN: no rash on limited exam  NEURO: Mentation intact, speech normal, normal strength and tone, normal gait and stance  PSYCH: mentation appears normal. and affect  normal/bright  Results:  Recent Results (from the past 168 hour(s))   Basic metabolic panel    Collection Time: 04/12/22  9:07 AM   Result Value Ref Range    Glucose (External) 99 70 - 99 mg/dL    Urea Nitrogen (External) 25 (H) 6 - 22 mg/dL    Creatinine (External) 1.30 (H) 0.73 - 1.18 mg/dL    BUN/Creatinine Ratio (External) 19.2 10.0 - 25.0    Sodium (External) 142 136 - 145 meq/L    Potassium (External) 4.6 3.5 - 5.1 meq/L    Chloride (External) (External) 108 98 - 109 meq/L    CO2 (External) 24 22 - 31 meq/L    Anion Gap (External) 15 6 - 20 meq/L    Calcium (External) 9.4 8.5 - 10.5 mg/dL    GFR Estimated (External) 57 (L) >=60 mL/min/1.73m2   Magnesium    Collection Time: 04/12/22  9:07 AM   Result Value Ref Range    Magnesium (External) 1.7 1.6 - 2.6 mg/dL   CBC with platelets    Collection Time: 04/12/22  9:07 AM   Result Value Ref Range    WBC Count (External) 8.2 4.0 - 11.0 K/uL    RBC Count (External) 4.54 4.40 - 5.80 M/uL    Hemoglobin (External) 11.9 (L) 13.5 - 17.5 g/dL    Hematocrit (External) 38.8 (L) 40.0 - 50.0 %    MCV (External) 85.5 80.0 - 98.0 fL    MCH (External) 26.2 25.5 - 34.0 Pg    MCHC (External) 30.7 (L) 31.5 - 36.5 g/dL    RDW (External) 15.7 (H) 11.5 - 15.5 %    Platelet Count (External) 240 140 - 400 K/uL   Manual Differential    Collection Time: 04/12/22  9:07 AM   Result Value Ref Range    Method (External) Auto     Absolute Neutrophils (External) 4.9 1.8 - 8.0 K/uL    Absolute Lymphocytes (External) 2.1 0.8 - 4.1 K/uL    Absolute Monocytes (External) 0.9 0.0 - 1.0 K/uL    Absolute Eosinophils (External) 0.1 0.0 - 0.7 K/uL    Absolute Basophils (External) 0.0 0.0 - 0.2 K/uL    Absolute Immature Granulocytes (External) 0.21 (H) 0.00 - 0.06 K/uL    % Neutrophils (External) 58.9 %    % Lymphocytes (External) 25.9 %    % Monocytes (External) 11.3 %    % Immature Granulocytes (External) 2.6 %    % Eosinophils (External) 0.9 %    % Basophils (External) 0.4 %    Nucleated RBCs (External)  0 0 - 1 /100 WBC   Asymptomatic COVID-19 Virus (Coronavirus) by PCR Nose    Collection Time: 04/18/22 10:20 AM    Specimen: Nose; Swab   Result Value Ref Range    SARS CoV2 PCR Negative Negative, Testing sent to reference lab. Results will be returned via unsolicited result   Basic metabolic panel    Collection Time: 04/18/22 10:23 AM   Result Value Ref Range    Sodium 144 133 - 144 mmol/L    Potassium 4.1 3.4 - 5.3 mmol/L    Chloride 109 94 - 109 mmol/L    Carbon Dioxide (CO2) 26 20 - 32 mmol/L    Anion Gap 9 3 - 14 mmol/L    Urea Nitrogen 25 7 - 30 mg/dL    Creatinine 1.20 0.66 - 1.25 mg/dL    Calcium 9.5 8.5 - 10.1 mg/dL    Glucose 96 70 - 99 mg/dL    GFR Estimate 71 >60 mL/min/1.73m2   Magnesium    Collection Time: 04/18/22 10:23 AM   Result Value Ref Range    Magnesium 1.5 (L) 1.6 - 2.3 mg/dL   CBC with platelets    Collection Time: 04/18/22 10:23 AM   Result Value Ref Range    WBC Count 6.0 4.0 - 11.0 10e3/uL    RBC Count 4.41 4.40 - 5.90 10e6/uL    Hemoglobin 11.7 (L) 13.3 - 17.7 g/dL    Hematocrit 37.4 (L) 40.0 - 53.0 %    MCV 85 78 - 100 fL    MCH 26.5 26.5 - 33.0 pg    MCHC 31.3 (L) 31.5 - 36.5 g/dL    RDW 15.2 (H) 10.0 - 15.0 %    Platelet Count 185 150 - 450 10e3/uL   INR    Collection Time: 04/18/22 10:23 AM   Result Value Ref Range    INR 1.21 (H) 0.85 - 1.15   General PFT Lab (Please always keep checked)    Collection Time: 04/18/22 10:46 AM   Result Value Ref Range    FVC-Pred 3.98 L    FVC-Pre 1.98 L    FVC-%Pred-Pre 49 %    FEV1-Pre 1.24 L    FEV1-%Pred-Pre 39 %    FEV1FVC-Pred 79 %    FEV1FVC-Pre 63 %    FEFMax-Pred 8.37 L/sec    FEFMax-Pre 3.96 L/sec    FEFMax-%Pred-Pre 47 %    FEF2575-Pred 2.83 L/sec    FEF2575-Pre 0.64 L/sec    ZED1508-%Pred-Pre 22 %    ExpTime-Pre 11.23 sec    FIFMax-Pre 4.44 L/sec    FEV1FEV6-Pred 80 %    FEV1FEV6-Pre 64 %                         Results as noted above.    PFT Interpretation:  Severe restrictive ventilatory defect.  Unchanged from previous.   Below recent  best.   Valid Maneuver                Transplant Coordinator Note    Reason for visit: Post lung transplant follow up visit   Coordinator: Present   Caregiver:  SO    Health concerns addressed today:  1. Respiratory - increased cough and more sputum - clear. Getting winded with activities - getting a little better.   2. Chest soreness - comes and goes.   3.  No fever, has night sweats - has had since surgery, stable.   4. GI: appetite good. Loose stool off and on - improved with metamucil.    5. ENT: no issues, at baseline. Continues to have blind spots and double vision off and on - no changes.        Activity/rehab: up [facility]  Oxygen needs: room air  Pain management/RX:   Diabetic management: on lantus  Next Bronch due: 4/19/2022  CMV status: D-/R-  EBV status: D+/R+  AC/asa: on coumadin, bridged to Lovenox for bronch  PJP prophylactic: Bactrim    COVID:  1. COVID-19 infection (yes/no, date of most recent positive test): no  2. Status/instructions given about COVID-19 vaccine: Received Evusheld x 1, need 2nd dose of Evusheld    Pt Education: medications (use/dose/side effects), how/when to call coordinator, frequency of labs, s/s of infection/rejection, call prior to starting any new medications, lab/vital sign book    Health Maintenance:     Last colonoscopy:     Next colonoscopy due:     Dermatology:     Vaccinations this visit:     Labs, CXR, PFTs reviewed with patient  Medication record reviewed and reconciled  Questions and concerns addressed    Patient Instructions  1. Continue to hydrate with 60-70 oz fluids daily.  2. Continue to exercise daily or most days of the week.  3. Follow up with your primary care provider for annual gender health maintenance procedures.  4. Follow up with colonoscopy schedule.  5. Follow up with annual dermatology visits.  6. It doesn't seem like the COVID vaccine is working well in lung transplant patients. A number of lung transplant patients have gotten sick with COVID even  after receiving the vaccines.  Based on our recent experience, it can be life-threatening to get COVID  even after being vaccinated. Please continue to act like you did not get the COVID vaccine - social distancing, wearing a mask, good hand hygiene, etc. If the people around you are vaccinated, it will help reduce the risk of you getting COVID. All members of your household should be vaccinated.  7. Your last prednisone taper is on 4/24.   8. We are going to give you 5mg of Vitamin K today and recheck your INR tomorrow morning before your bronchoscopy.     Next transplant clinic appointment: 2 months with CXR, labs and PFTs  Next lab draw: pending tacrolimus level, otherwise every 2 weeks       AVS printed at time of check out            Again, thank you for allowing me to participate in the care of your patient.        Sincerely,        Abiodun Woods MD

## 2022-04-18 NOTE — PROGRESS NOTES
Bronchoscopy with lavage and biopsies.   4/18 COVID test negative.     Date of transplant 10/16/2022   Transplant type bilateral lung transplant  Date of labs 4/18/2022  BUN 25 DDAVP no  Plt 185  INR 1.21 Anticoag therapy Counadin been on hold, last dose of Lovenox 4/17 PM  Comment: patient INR elevated on 4/18. Given 5mg Vitamin K and lab appointment for recheck 4/19AM in [facility] room (ordered STAT). If INR still elevated (or if have any questions/concerns), please page Dr. Woods at [phone]

## 2022-04-18 NOTE — PROGRESS NOTES
Transplant Coordinator Note    Reason for visit: Post lung transplant follow up visit   Coordinator: Present   Caregiver:  SO    Health concerns addressed today:  1. Respiratory - increased cough and more sputum - clear. Getting winded with activities - getting a little better.   2. Chest soreness - comes and goes.   3.  No fever, has night sweats - has had since surgery, stable.   4. GI: appetite good. Loose stool off and on - improved with metamucil.    5. ENT: no issues, at baseline. Continues to have blind spots and double vision off and on - no changes.        Activity/rehab: up [facility]  Oxygen needs: room air  Pain management/RX:   Diabetic management: on lantus  Next Bronch due: 4/19/2022  CMV status: D-/R-  EBV status: D+/R+  AC/asa: on coumadin, bridged to Lovenox for bronch  PJP prophylactic: Bactrim    COVID:  1. COVID-19 infection (yes/no, date of most recent positive test): no  2. Status/instructions given about COVID-19 vaccine: Received Evusheld x 1, need 2nd dose of Evusheld    Pt Education: medications (use/dose/side effects), how/when to call coordinator, frequency of labs, s/s of infection/rejection, call prior to starting any new medications, lab/vital sign book    Health Maintenance:     Last colonoscopy:     Next colonoscopy due:     Dermatology:     Vaccinations this visit:     Labs, CXR, PFTs reviewed with patient  Medication record reviewed and reconciled  Questions and concerns addressed    Patient Instructions  1. Continue to hydrate with 60-70 oz fluids daily.  2. Continue to exercise daily or most days of the week.  3. Follow up with your primary care provider for annual gender health maintenance procedures.  4. Follow up with colonoscopy schedule.  5. Follow up with annual dermatology visits.  6. It doesn't seem like the COVID vaccine is working well in lung transplant patients. A number of lung transplant patients have gotten sick with COVID even after receiving the vaccines.  Based on  our recent experience, it can be life-threatening to get COVID  even after being vaccinated. Please continue to act like you did not get the COVID vaccine - social distancing, wearing a mask, good hand hygiene, etc. If the people around you are vaccinated, it will help reduce the risk of you getting COVID. All members of your household should be vaccinated.  7. Your last prednisone taper is on 4/24.   8. We are going to give you 5mg of Vitamin K today and recheck your INR tomorrow morning before your bronchoscopy.     Next transplant clinic appointment: 2 months with CXR, labs and PFTs  Next lab draw: pending tacrolimus level, otherwise every 2 weeks       AVS printed at time of check out

## 2022-04-19 ENCOUNTER — APPOINTMENT (OUTPATIENT)
Dept: GENERAL RADIOLOGY | Facility: CLINIC | Age: 57
End: 2022-04-19
Attending: INTERNAL MEDICINE
Payer: COMMERCIAL

## 2022-04-19 ENCOUNTER — TELEPHONE (OUTPATIENT)
Dept: ANTICOAGULATION | Facility: CLINIC | Age: 57
End: 2022-04-19

## 2022-04-19 ENCOUNTER — HOSPITAL ENCOUNTER (OUTPATIENT)
Facility: CLINIC | Age: 57
Discharge: HOME | End: 2022-04-19
Attending: INTERNAL MEDICINE | Admitting: INTERNAL MEDICINE
Payer: COMMERCIAL

## 2022-04-19 VITALS
TEMPERATURE: 98.1 F | HEART RATE: 69 BPM | BODY MASS INDEX: 27.74 KG/M2 | DIASTOLIC BLOOD PRESSURE: 80 MMHG | WEIGHT: 162.48 LBS | RESPIRATION RATE: 14 BRPM | SYSTOLIC BLOOD PRESSURE: 127 MMHG | OXYGEN SATURATION: 96 % | HEIGHT: 64 IN

## 2022-04-19 LAB
APPEARANCE FLD: ABNORMAL
BRONCHOSCOPY: NORMAL
CELL COUNT BODY FLUID SOURCE: ABNORMAL
CMV DNA SPEC NAA+PROBE-ACNC: NOT DETECTED IU/ML
COLOR FLD: COLORLESS
EBV DNA # SPEC NAA+PROBE: NOT DETECTED COPIES/ML
GLUCOSE BLDC GLUCOMTR-MCNC: 117 MG/DL (ref 70–99)
GRAM STAIN RESULT: NORMAL
GRAM STAIN RESULT: NORMAL
INR PPP: 1.16 (ref 0.85–1.15)
LYMPHOCYTES NFR FLD MANUAL: 1 %
MONOS+MACROS NFR FLD MANUAL: 81 %
NEUTS BAND NFR FLD MANUAL: 18 %
PATH REPORT.COMMENTS IMP SPEC: NORMAL
PATH REPORT.COMMENTS IMP SPEC: NORMAL
PATH REPORT.FINAL DX SPEC: NORMAL
PATH REPORT.GROSS SPEC: NORMAL
PATH REPORT.MICROSCOPIC SPEC OTHER STN: NORMAL
PATH REPORT.RELEVANT HX SPEC: NORMAL
WBC # FLD AUTO: 121 /UL

## 2022-04-19 PROCEDURE — 87633 RESP VIRUS 12-25 TARGETS: CPT | Performed by: INTERNAL MEDICINE

## 2022-04-19 PROCEDURE — 87101 SKIN FUNGI CULTURE: CPT | Performed by: INTERNAL MEDICINE

## 2022-04-19 PROCEDURE — 88312 SPECIAL STAINS GROUP 1: CPT | Mod: TC | Performed by: INTERNAL MEDICINE

## 2022-04-19 PROCEDURE — 88305 TISSUE EXAM BY PATHOLOGIST: CPT | Mod: 26 | Performed by: PATHOLOGY

## 2022-04-19 PROCEDURE — 89050 BODY FLUID CELL COUNT: CPT | Performed by: INTERNAL MEDICINE

## 2022-04-19 PROCEDURE — 999N000065 XR CHEST 2 VW

## 2022-04-19 PROCEDURE — 71046 X-RAY EXAM CHEST 2 VIEWS: CPT | Mod: 26 | Performed by: RADIOLOGY

## 2022-04-19 PROCEDURE — 31628 BRONCHOSCOPY/LUNG BX EACH: CPT | Performed by: INTERNAL MEDICINE

## 2022-04-19 PROCEDURE — 82962 GLUCOSE BLOOD TEST: CPT

## 2022-04-19 PROCEDURE — 87070 CULTURE OTHR SPECIMN AEROBIC: CPT | Performed by: INTERNAL MEDICINE

## 2022-04-19 PROCEDURE — 250N000011 HC RX IP 250 OP 636: Performed by: INTERNAL MEDICINE

## 2022-04-19 PROCEDURE — 99153 MOD SED SAME PHYS/QHP EA: CPT | Performed by: INTERNAL MEDICINE

## 2022-04-19 PROCEDURE — 87102 FUNGUS ISOLATION CULTURE: CPT | Mod: 59 | Performed by: INTERNAL MEDICINE

## 2022-04-19 PROCEDURE — 88108 CYTOPATH CONCENTRATE TECH: CPT | Mod: 26 | Performed by: PATHOLOGY

## 2022-04-19 PROCEDURE — G0500 MOD SEDAT ENDO SERVICE >5YRS: HCPCS | Performed by: INTERNAL MEDICINE

## 2022-04-19 PROCEDURE — 88305 TISSUE EXAM BY PATHOLOGIST: CPT | Mod: TC | Performed by: INTERNAL MEDICINE

## 2022-04-19 PROCEDURE — 87205 SMEAR GRAM STAIN: CPT | Performed by: INTERNAL MEDICINE

## 2022-04-19 PROCEDURE — 31624 DX BRONCHOSCOPE/LAVAGE: CPT | Performed by: INTERNAL MEDICINE

## 2022-04-19 PROCEDURE — 36415 COLL VENOUS BLD VENIPUNCTURE: CPT | Performed by: INTERNAL MEDICINE

## 2022-04-19 PROCEDURE — 250N000009 HC RX 250: Performed by: INTERNAL MEDICINE

## 2022-04-19 PROCEDURE — 99152 MOD SED SAME PHYS/QHP 5/>YRS: CPT | Performed by: INTERNAL MEDICINE

## 2022-04-19 PROCEDURE — 87206 SMEAR FLUORESCENT/ACID STAI: CPT | Performed by: INTERNAL MEDICINE

## 2022-04-19 PROCEDURE — 85610 PROTHROMBIN TIME: CPT | Performed by: INTERNAL MEDICINE

## 2022-04-19 PROCEDURE — 88312 SPECIAL STAINS GROUP 1: CPT | Mod: 26 | Performed by: PATHOLOGY

## 2022-04-19 PROCEDURE — 31628 BRONCHOSCOPY/LUNG BX EACH: CPT

## 2022-04-19 RX ORDER — MAGNESIUM HYDROXIDE 1200 MG/15ML
LIQUID ORAL PRN
Status: COMPLETED | OUTPATIENT
Start: 2022-04-19 | End: 2022-04-19

## 2022-04-19 RX ORDER — LIDOCAINE HYDROCHLORIDE AND EPINEPHRINE 10; 10 MG/ML; UG/ML
INJECTION, SOLUTION INFILTRATION; PERINEURAL PRN
Status: COMPLETED | OUTPATIENT
Start: 2022-04-19 | End: 2022-04-19

## 2022-04-19 RX ORDER — LIDOCAINE HYDROCHLORIDE 10 MG/ML
INJECTION, SOLUTION INFILTRATION; PERINEURAL PRN
Status: COMPLETED | OUTPATIENT
Start: 2022-04-19 | End: 2022-04-19

## 2022-04-19 RX ORDER — FENTANYL CITRATE 50 UG/ML
INJECTION, SOLUTION INTRAMUSCULAR; INTRAVENOUS PRN
Status: COMPLETED | OUTPATIENT
Start: 2022-04-19 | End: 2022-04-19

## 2022-04-19 RX ORDER — LIDOCAINE HYDROCHLORIDE 40 MG/ML
INJECTION, SOLUTION RETROBULBAR PRN
Status: COMPLETED | OUTPATIENT
Start: 2022-04-19 | End: 2022-04-19

## 2022-04-19 RX ADMIN — FENTANYL CITRATE 50 MCG: 50 INJECTION, SOLUTION INTRAMUSCULAR; INTRAVENOUS at 09:42

## 2022-04-19 RX ADMIN — FENTANYL CITRATE 50 MCG: 50 INJECTION, SOLUTION INTRAMUSCULAR; INTRAVENOUS at 09:38

## 2022-04-19 RX ADMIN — FENTANYL CITRATE 50 MCG: 50 INJECTION, SOLUTION INTRAMUSCULAR; INTRAVENOUS at 10:02

## 2022-04-19 RX ADMIN — LIDOCAINE HYDROCHLORIDE 9 ML: 40 INJECTION, SOLUTION RETROBULBAR; TOPICAL at 09:43

## 2022-04-19 RX ADMIN — SODIUM CHLORIDE 180 ML: 900 IRRIGANT IRRIGATION at 09:45

## 2022-04-19 RX ADMIN — LIDOCAINE HYDROCHLORIDE AND EPINEPHRINE 5 ML: 10; 10 INJECTION, SOLUTION INFILTRATION; PERINEURAL at 10:06

## 2022-04-19 RX ADMIN — MIDAZOLAM 1 MG: 1 INJECTION INTRAMUSCULAR; INTRAVENOUS at 09:42

## 2022-04-19 RX ADMIN — MIDAZOLAM 1 MG: 1 INJECTION INTRAMUSCULAR; INTRAVENOUS at 09:38

## 2022-04-19 RX ADMIN — LIDOCAINE HYDROCHLORIDE 12 ML: 10 INJECTION, SOLUTION INFILTRATION; PERINEURAL at 09:43

## 2022-04-19 RX ADMIN — LIDOCAINE HYDROCHLORIDE AND EPINEPHRINE 5 ML: 10; 10 INJECTION, SOLUTION INFILTRATION; PERINEURAL at 10:00

## 2022-04-19 NOTE — OR NURSING
Patient had bronchoscopy with lavage and biopsy.  Patient tolerated procedure under conscious sedation and 2-6 liters nasal cannula.   Patient to have 2-view xray at 1100

## 2022-04-19 NOTE — DISCHARGE INSTRUCTIONS
Bronchoscopy Discharge Instructions    Common Side Effects and Care    Fever:   You may get a fever the evening after your bronchoscopy.   ?  If you develop a fever higher than 101 degrees Fahrenheit (38.3 degrees Celsius) you may take acetaminophen (Tylenol).  ?  If your fever lasts longer than 24 hours, call your doctor.     Sore Throat:   ?  Take throat lozenges (regular or sugar free) as needed or over-the-counter throat spray (like Chloraseptic).   ?  Drink plenty of fluids.   ?  Rest your voice for a day.   ?  Smoking may increase throat irritation.     Coughing:   ?  Small amounts of phlegm are common for 24 hours after the procedure.   ?  Increased coughing occurring beyond 24 hours after the procedure can happen.     Side Effects requiring notification of your doctor:   ?  Intravenous site redness or drainage   ?  Bleeding (more than a teaspoon) from your nose or throat     After the procedure:     Food and Fluids:   ?  Because your throat may feel numb for several hours, you cannot eat or drink anything until your nurse determines that your gag and swallow reflexes have returned.   ?  Restart eating soft foods and then regular meals when you feel ready, unless otherwise instructed by your health team.   Activity:   ? You may feel tired or dizzy after the procedure. When you feel ready, slowly return to your regular activities.   Medications:   ?  Restart all of your previous medications as directed by your doctor.   ?  Your doctor will tell you if and when it is safe to resume anti-inflammatory medications or   blood thinners.   24 Hour Restrictions after Bronchoscopy (due to the effects of sedation):   ? Do not drive or operate any machinery. (For example, a car or .)   ? Do not drink alcohol.  ? Do not make important decisions or sign contracts or legal documents.   Emergency Precautions:  Go to the nearest emergency room if you have any of the following and be sure to tell them you had a  bronchoscopy:   ?  Difficulty breathing   ?  Chest pain or discomfort   ?  Severe throat pain or neck swelling   ?  Severe dizziness, weakness, or a fast heart rate   ?  Large amount of bleeding: from your nose or throat   If you have questions or concerns about this procedure, call your transplant coordinator (After hours, call the [facility] at [phone] and ask to speak to the Pulmonary doctor on-call)

## 2022-04-19 NOTE — RESULT ENCOUNTER NOTE
Tacrolimus level 10.8 at 14.5 hours, on 4/18/2022.  Goal 8-12.   Current dose 3 mg in AM, 3 mg in PM    Level most likely at goal, no change in dose.   [employer] message sent

## 2022-04-19 NOTE — TELEPHONE ENCOUNTER
ANTICOAGULATION  MANAGEMENT: Discharge Review    Edson Thornton chart reviewed for anticoagulation continuity of care    Outpatient surgery/procedure on 4/19/22 for bronchoscopy.    Discharge disposition: Home    Results:    Recent labs: (last 7 days)     04/18/22  1023 04/19/22  0834   INR 1.21* 1.16*     Anticoagulation inpatient management:     not applicable     Anticoagulation discharge instructions:     Warfarin dosing: hold Warfarin tonight. 20mg dose tomorrow and then resume maintenance dosing   Bridging: bridging with enoxaparin (Lovenox) starting again 4/20 Q 12 hours   INR goal change: No      Medication changes affecting anticoagulation: No    Additional factors affecting anticoagulation: No    Plan     Agree with dosing adjustment on discharge  Agree with discharge plan for follow up on INR on 4/26/22    Spoke with [place]    Anticoagulation Calendar updated    Dionne Lopez RN

## 2022-04-20 ENCOUNTER — TELEPHONE (OUTPATIENT)
Dept: TRANSPLANT | Facility: CLINIC | Age: 57
End: 2022-04-20
Payer: COMMERCIAL

## 2022-04-20 LAB
DONOR IDENTIFICATION: NORMAL
DQB5: 908
DSA COMMENTS: NORMAL
DSA PRESENT: YES
DSA TEST METHOD: NORMAL
FLUAV H1 2009 PAND RNA SPEC QL NAA+PROBE: NEGATIVE
FLUAV H1 RNA SPEC QL NAA+PROBE: NEGATIVE
FLUAV H3 RNA SPEC QL NAA+PROBE: NEGATIVE
FLUAV RNA SPEC QL NAA+PROBE: NEGATIVE
FLUBV RNA SPEC QL NAA+PROBE: NEGATIVE
HADV DNA SPEC QL NAA+PROBE: NEGATIVE
HADV DNA SPEC QL NAA+PROBE: NEGATIVE
HMPV RNA SPEC QL NAA+PROBE: NEGATIVE
HPIV1 RNA SPEC QL NAA+PROBE: NEGATIVE
HPIV2 RNA SPEC QL NAA+PROBE: NEGATIVE
HPIV3 RNA SPEC QL NAA+PROBE: NEGATIVE
ORGAN: NORMAL
PATH REPORT.COMMENTS IMP SPEC: NORMAL
PATH REPORT.COMMENTS IMP SPEC: NORMAL
PATH REPORT.FINAL DX SPEC: NORMAL
PATH REPORT.GROSS SPEC: NORMAL
PATH REPORT.MICROSCOPIC SPEC OTHER STN: NORMAL
PATH REPORT.RELEVANT HX SPEC: NORMAL
PHOTO IMAGE: NORMAL
RHINOVIRUS RNA SPEC QL NAA+PROBE: NEGATIVE
RSV RNA SPEC QL NAA+PROBE: NEGATIVE
RSV RNA SPEC QL NAA+PROBE: NEGATIVE
SA 1 CELL: NORMAL
SA 1 TEST METHOD: NORMAL
SA 2 CELL: NORMAL
SA 2 TEST METHOD: NORMAL
SA1 HI RISK ABY: NORMAL
SA1 MOD RISK ABY: NORMAL
SA2 HI RISK ABY: NORMAL
SA2 MOD RISK ABY: NORMAL
UNACCEPTABLE ANTIGENS: NORMAL
UNOS CPRA: 26
ZZZSA 1  COMMENTS: NORMAL
ZZZSA 2 COMMENTS: NORMAL

## 2022-04-20 NOTE — TELEPHONE ENCOUNTER
Provider Call: Voicemail  Date/Time: 4/20/2022    Reason for call: Needs a call back re Rx from Dr Woods

## 2022-04-20 NOTE — TELEPHONE ENCOUNTER
Called [facility] back.   Clarified tacrolimus Rx. Patient taking 3mg BID, will cancel 0.5mg Rx.

## 2022-04-21 LAB — BACTERIA BRONCH: NORMAL

## 2022-04-22 ENCOUNTER — TELEPHONE (OUTPATIENT)
Dept: ANTICOAGULATION | Facility: CLINIC | Age: 57
End: 2022-04-22
Payer: COMMERCIAL

## 2022-04-22 NOTE — TELEPHONE ENCOUNTER
Patient called wondering if he needs to continue his Lovenox injections until his next INR? Writer explained that this does need to continue and that there is a 1 refill left on his prescription. Patient communicated understanding and will  the refill.     Suni Choi RN   Anticoagulation Nurse   [facility] [phone]

## 2022-04-26 ENCOUNTER — ANTICOAGULATION THERAPY VISIT (OUTPATIENT)
Dept: ANTICOAGULATION | Facility: CLINIC | Age: 57
End: 2022-04-26
Payer: COMMERCIAL

## 2022-04-26 ENCOUNTER — TELEPHONE (OUTPATIENT)
Dept: ANTICOAGULATION | Facility: CLINIC | Age: 57
End: 2022-04-26
Payer: COMMERCIAL

## 2022-04-26 DIAGNOSIS — I48.91 ATRIAL FIBRILLATION, UNSPECIFIED TYPE (H): Primary | ICD-10-CM

## 2022-04-26 NOTE — TELEPHONE ENCOUNTER
Incoming call received from Bret who is at his local clinic for a lab draw and they are unable to find previously faxed standing lab orders. Obtained fax and phone numbers for local clinic, updated comments.     ESSIE COLÓN RN-BC, [facility]  Anticoagulation Clinic  [phone]

## 2022-04-26 NOTE — PROGRESS NOTES
ANTICOAGULATION MANAGEMENT     Edson Thornton 56 year old male is on warfarin with therapeutic INR result. (Goal INR 2.0-3.0)    Recent labs: (last 7 days)     04/26/22  0821   INR 2.15*       ASSESSMENT     Source(s): Chart Review and Patient/Caregiver Call     Warfarin doses taken: Warfarin recently held for procedure which may be affecting INR  Diet: No new diet changes identified  New illness, injury, or hospitalization: No  Medication/supplement changes: None noted  Signs or symptoms of bleeding or clotting: No  Previous INR: Subtherapeutic  Additional findings: Discussed with Usha Singh [facility].  INR is therapeutic, recommended patient STOP Lovenox injections. Resume maintenance dose.       PLAN     Recommended plan for no diet, medication or health factor changes affecting INR     Dosing Instructions: continue your current warfarin dose with next INR in 1 week       Summary  As of 4/26/2022    Full warfarin instructions:  4 mg every Sun, Thu; 5 mg all other days   Next INR check:  5/3/2022             Telephone call with Bret who agrees to plan and repeated back plan correctly    Patient using outside facility for labs    Education provided: Please call back if any changes to your diet, medications or how you've been taking warfarin, Monitoring for bleeding signs and symptoms, Monitoring for clotting signs and symptoms, When to seek medical attention/emergency care and Lovenox/Heparin education provided: disposal of syringes     Plan made with [facility] Pharmacist Nick Richmond, RN  Anticoagulation Clinic  4/26/2022    _______________________________________________________________________     Anticoagulation Episode Summary     Current INR goal:  2.0-3.0   TTR:  58.4 % (3.6 mo)   Target end date:  Indefinite   Send INR reminders to:  [facility]    Indications    Atrial fibrillation  unspecified type (H) [I48.91]           Comments:  Zay (p) [phone]  (f) [phone]          Anticoagulation Care Providers     Provider Role Specialty Phone number    Abiodun Woods MD Referring Pulmonary Disease [phone]

## 2022-04-29 NOTE — PROGRESS NOTES
This is a recent snapshot of the patient's [facility] medical record.  For current drug dose and complete information and questions, call [phone] or In Basket pool, fv home infusion ([id number])  CSN Number:  [id number]

## 2022-05-03 NOTE — PROGRESS NOTES
This is a recent snapshot of the patient's [facility] medical record.  For current drug dose and complete information and questions, call [phone] or In Basket pool, fv home infusion ([id number])  CSN Number:  [id number]

## 2022-05-05 ENCOUNTER — LAB (OUTPATIENT)
Dept: LAB | Facility: CLINIC | Age: 57
End: 2022-05-05
Payer: COMMERCIAL

## 2022-05-05 ENCOUNTER — ANTICOAGULATION THERAPY VISIT (OUTPATIENT)
Dept: ANTICOAGULATION | Facility: CLINIC | Age: 57
End: 2022-05-05
Payer: COMMERCIAL

## 2022-05-05 DIAGNOSIS — I48.91 ATRIAL FIBRILLATION, UNSPECIFIED TYPE (H): Primary | ICD-10-CM

## 2022-05-05 DIAGNOSIS — Z79.899 ENCOUNTER FOR LONG-TERM (CURRENT) USE OF HIGH-RISK MEDICATION: ICD-10-CM

## 2022-05-05 DIAGNOSIS — Z94.2 S/P LUNG TRANSPLANT (H): Chronic | ICD-10-CM

## 2022-05-05 DIAGNOSIS — D84.9 IMMUNOSUPPRESSED STATUS (H): ICD-10-CM

## 2022-05-05 DIAGNOSIS — E83.42 HYPOMAGNESEMIA: ICD-10-CM

## 2022-05-05 DIAGNOSIS — I10 PRIMARY HYPERTENSION: ICD-10-CM

## 2022-05-05 DIAGNOSIS — I48.91 ATRIAL FIBRILLATION, UNSPECIFIED TYPE (H): ICD-10-CM

## 2022-05-05 PROCEDURE — 36415 COLL VENOUS BLD VENIPUNCTURE: CPT

## 2022-05-05 PROCEDURE — 80197 ASSAY OF TACROLIMUS: CPT

## 2022-05-05 NOTE — PROGRESS NOTES
ANTICOAGULATION MANAGEMENT     Edson Thornton 56 year old male is on warfarin with supratherapeutic INR result. (Goal INR 2.0-3.0)    Recent labs: (last 7 days)     05/03/22  0800   INR 3.34*       ASSESSMENT     Source(s): Chart Review and Patient/Caregiver Call     Warfarin doses taken: Warfarin taken as instructed  Diet: No new diet changes identified  New illness, injury, or hospitalization: No  Medication/supplement changes: There was a mix up with prednisone dosing but that should be straightened out now.   Signs or symptoms of bleeding or clotting: No  Previous INR: Therapeutic last visit; previously outside of goal range  Additional findings: None       PLAN     Recommended plan for no diet, medication or health factor changes affecting INR     Dosing Instructions: decrease your warfarin dose (9% change) with next INR in 1 week       Summary  As of 5/5/2022    Full warfarin instructions:  5 mg every Sun, Wed; 4 mg all other days   Next INR check:  5/10/2022             Telephone call with [place] who verbalizes understanding and agrees to plan and who agrees to plan and repeated back plan correctly    Patient using outside facility for labs    Education provided: Goal range and significance of current result, Importance of therapeutic range, Importance of following up at instructed interval and Importance of taking warfarin as instructed    Plan made per [facility] anticoagulation protocol    Terrie Cedillo RN  Anticoagulation Clinic  5/5/2022    _______________________________________________________________________     Anticoagulation Episode Summary     Current INR goal:  2.0-3.0   TTR:  59.2 % (3.8 mo)   Target end date:  Indefinite   Send INR reminders to:  [facility]    Indications    Atrial fibrillation  unspecified type (H) [I48.91]           Comments:  Zay (p) [phone]  (f) [phone]         Anticoagulation Care Providers     Provider Role Specialty Phone number    Abiodun Woods  MD Yanick Referring Pulmonary Disease [phone]

## 2022-05-05 NOTE — PROGRESS NOTES
This is a recent snapshot of the patient's [facility] medical record.  For current drug dose and complete information and questions, call [phone] or In Basket pool, fv home infusion ([id number])  CSN Number:  [id number]

## 2022-05-08 LAB
TACROLIMUS BLD-MCNC: 15 UG/L (ref 5–15)
TME LAST DOSE: NORMAL H
TME LAST DOSE: NORMAL H

## 2022-05-09 ENCOUNTER — TELEPHONE (OUTPATIENT)
Dept: TRANSPLANT | Facility: CLINIC | Age: 57
End: 2022-05-09
Payer: COMMERCIAL

## 2022-05-09 RX ORDER — TACROLIMUS 1 MG/1
CAPSULE ORAL
Qty: 150 CAPSULE | Refills: 11 | COMMUNITY
Start: 2022-05-09 | End: 2022-10-07

## 2022-05-09 NOTE — TELEPHONE ENCOUNTER
Tacrolimus level 15 at 12 hours, on 5/5/22.  Goal 8-12.   Current dose 3 mg in AM, 3 mg in PM    Dose changed to 3 mg in AM, 2 mg in PM   Recheck level in 1 week    Discussed with Bret and patient agreed with plan

## 2022-05-12 ENCOUNTER — LAB (OUTPATIENT)
Dept: LAB | Facility: CLINIC | Age: 57
End: 2022-05-12
Payer: COMMERCIAL

## 2022-05-12 NOTE — PROGRESS NOTES
Assessment/Plan:  Binocular diagonal diplopia/skew deviation likely also secondary to scattered posterior circulation infarcts  Patient reports improvement in binocular diplopia since its onset after stroke but still present in primary intermittently when tired and in lateral gazes. ?Ground in prism on glasses check vs induced prism from relatively high myopia/negative lenses. Regardless has benefit with 2 BD Fresnel over right lens in all gazes.     -2 BD Fresnel prism over right lens given today  -Manifest refraction with sensorimotor exam at next visit  -Follow-up with Dr. 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 He is a  and reports that he is unable to drive for three years because of his stroke and lung issues. He reported today that the company informed him he cannot drive because of his medical issues. Discussed that if he applied for class C license we would be able to attest that he meets criteria.     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 No compressive lesion noted on MRI brain. Unilateral glaucoma was considered however his injury follows ISNT rule with 10/11 color plates in the right eye at initial visit No APD noted today.    Edson Thornton is a pleasant 56 year old White male who presents to my neuro-ophthalmology clinic today for evaluation of vision    HPI:  Patient presenting for evaluation of field loss and diplopia that occurred after bilateral lung transplants in October.     Today complains of right inferior hemiquadrant loss. This has not changed since onset during hospitalization. Also  reports binocular diagonal diplopia that is worse at the end of the day. Denies ptosis, dysphagia, dysarthria. Still gets SOB but has significant lung history.     He is a  by trade and reports that he cannot drive for 3 years because of his health issues in addition to his vision problems.    He gets frequent headaches on the top of his head but denies jaw claudication, new or worsening hip or shoulder girdle pain. Recent weight loss since his hospitalization.     Independent historians: Patient    Review of outside testing:  MRI brain wwo contrast 10/23/21  Impression:  Multifocal subacute infarcts within both cerebral hemispheres and left  cerebellum all of which appear present on prior head CT 10/22/2021.  Minimal petechial hemorrhage associated with the infarct in the left  occipital lobe.     I have personally reviewed the examination and initial interpretation  and I agree with the findings.    MRI brain wwo contrast 10/26/21                                                               Impression:  1. Evolving acute age multifocal acute infarctions in both cerebral  hemispheres and left cerebellum. No new areas of infarction when  compared with prior MRI brain on 10/23/2021.  2. Minimally increased punctate regions of susceptibility effect  within areas of infarction, corresponding to petechial hemorrhage  within previously identified multifocal acute infarctions.  3. Head MRA demonstrates no definite aneurysm or stenosis of the major  intracranial arteries.  4. Neck MRA demonstrates patent major cervical arteries.     My interpretation performed today of outside testing: Agree with above    Review of outside clinical notes: Reviewed noted from admission in 10/2021    Past ocular history: chickenpox affecting vision in the left eye    FHx: retinal detachment in father, no glaucoma, no AMD that he knows of  RA, and DMII    Medical History: ILD, RA, Bilateral lung transplant, A fib on Coumadin +  Lovenox, strokes     Family history / social history:  EtOH: none  Tobacco: no smoking  Drugs: no drugs    Interval History:   Last visit 2/7/22  He reports ongoing diplopia especially when tired. Otherwise no worsening visual or neuruologic symptoms. Denies significant headaches.     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 IOP was 18 in the right eye and 14 in the left eye. Visual fields were full in both eyes. Ocular motility was full in all gaze directions. Color plates were 11/11 in the right eye and 11/11 in the left eye.  Pupils were equal, round and reactive to light with no RAPD.     Strabismus exam: comitant RHT of 2-3   Anterior segment exam: 1-2+ NS  Dilated fundus exam: moderate temporal pallor of right optic nerve with crowded left nerve    Tests ordered and interpreted today:  OCT rNFL demonstrated a mean NFL thickness of 71 in the right eye and 83 in the left eye. Thickness profile demonstrated thinning temporally in the right eye and normal thickness in all quadrants in the left eye.  VF: DMV automated kinetic perimetry was performed. Test was reliable. Right eye demonstrated constriction of inferior field with improvement in hemiquadrantic defect . Left eye demonstrated essential resolution of hemiquadrantic defect. 180 degrees of peripheral vision in horizontal axis.       59 minutes spent on the date of encounter doing chart review, history and exam, documentation, and further activities as noted above.     Barrie Elizondo, DO   PGY-5 Neuro-Ophthalmology Fellow    Attending Physician Attestation:  Complete documentation of historical and exam elements from today's encounter can be found in the full encounter summary report (not reduplicated in this progress note).  I personally obtained the chief complaint(s) and  history of present illness.  I confirmed and edited as necessary the review of systems, past medical/surgical history, family history, social history, and examination findings as documented by others; and I examined the patient myself.  I personally reviewed the relevant tests, images, and reports as documented above.  I formulated and edited as necessary the assessment and plan and discussed the findings and management plan with the patient and family. - Barrie Elizondo, DO

## 2022-05-13 ENCOUNTER — OFFICE VISIT (OUTPATIENT)
Dept: OPHTHALMOLOGY | Facility: CLINIC | Age: 57
End: 2022-05-13
Attending: OPHTHALMOLOGY
Payer: COMMERCIAL

## 2022-05-13 DIAGNOSIS — H53.40 VISUAL FIELD DEFECT: ICD-10-CM

## 2022-05-13 DIAGNOSIS — H53.2 DIPLOPIA: Primary | ICD-10-CM

## 2022-05-13 DIAGNOSIS — H53.40 VISUAL FIELD DEFECT: Primary | ICD-10-CM

## 2022-05-13 PROCEDURE — 92081 LIMITED VISUAL FIELD XM: CPT | Performed by: INTERNAL MEDICINE

## 2022-05-13 PROCEDURE — 92133 CPTRZD OPH DX IMG PST SGM ON: CPT | Performed by: INTERNAL MEDICINE

## 2022-05-13 PROCEDURE — V2718 FRESNELL PRISM PRESS-ON LENS: HCPCS | Performed by: INTERNAL MEDICINE

## 2022-05-13 PROCEDURE — G0463 HOSPITAL OUTPT CLINIC VISIT: HCPCS

## 2022-05-13 PROCEDURE — 99214 OFFICE O/P EST MOD 30 MIN: CPT | Performed by: INTERNAL MEDICINE

## 2022-05-13 ASSESSMENT — SLIT LAMP EXAM - LIDS
COMMENTS: NORMAL
COMMENTS: NORMAL

## 2022-05-13 ASSESSMENT — REFRACTION_WEARINGRX
OD_SPHERE: -5.75
OS_SPHERE: -5.75
OD_HBASE: OUT
OS_AXIS: 046
OS_HPRISM: 2.5
OD_VPRISM: 2.0
OD_VBASE: UP
OS_VBASE: UP
OS_CYLINDER: +1.25
OS_HBASE: OUT
OS_ADD: +2.50
OD_ADD: +2.50
OD_HPRISM: 2.5
OD_AXIS: 140
SPECS_TYPE: PAL
OD_CYLINDER: +1.00
OS_VPRISM: 2.0

## 2022-05-13 ASSESSMENT — TONOMETRY
OD_IOP_MMHG: 18
IOP_METHOD: TONOPEN
OS_IOP_MMHG: 14

## 2022-05-13 ASSESSMENT — VISUAL ACUITY
METHOD: SNELLEN - LINEAR
OD_CC+: -1
OS_CC: 20/20
OD_CC: 20/20
CORRECTION_TYPE: GLASSES

## 2022-05-13 ASSESSMENT — CONF VISUAL FIELD
METHOD: COUNTING FINGERS
OS_NORMAL: 1

## 2022-05-13 ASSESSMENT — EXTERNAL EXAM - LEFT EYE: OS_EXAM: NORMAL

## 2022-05-13 ASSESSMENT — CUP TO DISC RATIO
OS_RATIO: 0.1
OD_RATIO: 0.6

## 2022-05-13 NOTE — NURSING NOTE
Chief Complaints and History of Present Illnesses   Patient presents with     Visual Field Defect Follow Up     Chief Complaint(s) and History of Present Illness(es)     Visual Field Defect Follow Up     Laterality: both eyes    Course: stable    Associated symptoms: Negative for photophobia, flashes and floaters    Treatments tried: no treatments              Comments     Since last visit, his diplopia and blind spot are stable. He sees the diplopia intermittently. VA is about the same. No gtts.    Wil Gamez COT 2:14 PM May 13, 2022

## 2022-05-16 ENCOUNTER — TELEPHONE (OUTPATIENT)
Dept: TRANSPLANT | Facility: CLINIC | Age: 57
End: 2022-05-16
Payer: COMMERCIAL

## 2022-05-16 ENCOUNTER — ANTICOAGULATION THERAPY VISIT (OUTPATIENT)
Dept: ANTICOAGULATION | Facility: CLINIC | Age: 57
End: 2022-05-16
Payer: COMMERCIAL

## 2022-05-16 DIAGNOSIS — I48.91 ATRIAL FIBRILLATION, UNSPECIFIED TYPE (H): Primary | ICD-10-CM

## 2022-05-16 LAB — INR (EXTERNAL): ABNORMAL (ref 0.9–1.1)

## 2022-05-16 NOTE — TELEPHONE ENCOUNTER
General  Route to LPN    Reason for call: Lab called to report they are cancelling a lab collected on 05/12. States not labeled. Tech couldn't tell me which one was being cancelled.     Call back needed? No

## 2022-05-16 NOTE — TELEPHONE ENCOUNTER
Per [facility] lab, blood tube sent without a label, most likely for tacrolimus level.   [place] Everywhere message sent to patient with information requesting level recheck this week.   Creatinine up to 1.4 this week, requested patient hydrate well with 60-70oz of fluids.     Requested message back with questions, concerns, updates.

## 2022-05-16 NOTE — PROGRESS NOTES
Addendum 5/18/22 Bret reports via my chart that he stopped Lexapro and started Cymbalta 60mg daily. [facility]                  ANTICOAGULATION MANAGEMENT     Edson Thornton 56 year old male is on warfarin with supratherapeutic INR result. (Goal INR 2.0-3.0)    Recent labs: (last 7 days)     05/16/22  0000   INR 3,72*       ASSESSMENT     Source(s): Chart Review and Patient/Caregiver Call     Warfarin doses taken: Warfarin taken as instructed  Diet: No new diet changes identified  New illness, injury, or hospitalization: No  Medication/supplement changes: pt recently tapered down his prednisone to 5mg in the am and 2.5mg at supper for an undetermined amount of time  Signs or symptoms of bleeding or clotting: No  Previous INR: Supratherapeutic  Additional findings: None       PLAN     Recommended plan for ongoing change(s) affecting INR     Dosing Instructions: partial hold then decrease your warfarin dose (6.7% change) with next INR in 1 week       Summary  As of 5/16/2022    Full warfarin instructions:  5/16: 2 mg; Otherwise 4 mg every day   Next INR check:  5/24/2022             Telephone call with Bret who verbalizes understanding and agrees to plan    Patient using outside facility for labs    Education provided: Goal range and significance of current result    Plan made per [facility] anticoagulation protocol    Dionne Lopez RN  Anticoagulation Clinic  5/16/2022    _______________________________________________________________________     Anticoagulation Episode Summary     Current INR goal:  2.0-3.0   TTR:  59.2 % (3.8 mo)   Target end date:  Indefinite   Send INR reminders to:  [facility]    Indications    Atrial fibrillation  unspecified type (H) [I48.91]           Comments:  Zay (p) [phone]  (f) [phone]         Anticoagulation Care Providers     Provider Role Specialty Phone number    Abiodun Woods MD Referring Pulmonary Disease [phone]

## 2022-05-17 LAB
BACTERIA BRONCH: NO GROWTH
BACTERIA BRONCH: NO GROWTH

## 2022-05-18 NOTE — PROGRESS NOTES
Schedule patient for bronchoscopy 6/21 at 9AM.     Message sent to patient with information, reminding him will need to bridge to Lovenox for the procedure. Message also sent to anticoagulation clinic.     Requested message back with questions, concenrs, updates.

## 2022-05-19 ENCOUNTER — LAB (OUTPATIENT)
Dept: LAB | Facility: CLINIC | Age: 57
End: 2022-05-19
Payer: COMMERCIAL

## 2022-05-19 PROCEDURE — 80197 ASSAY OF TACROLIMUS: CPT | Performed by: INTERNAL MEDICINE

## 2022-05-19 PROCEDURE — 36415 COLL VENOUS BLD VENIPUNCTURE: CPT | Performed by: INTERNAL MEDICINE

## 2022-05-19 NOTE — PROGRESS NOTES
This is a recent snapshot of the patient's [facility] medical record.  For current drug dose and complete information and questions, call [phone] or In Basket pool, fv home infusion ([id number])  CSN Number:  [id number]

## 2022-05-22 LAB
TACROLIMUS BLD-MCNC: 12 UG/L (ref 5–15)
TME LAST DOSE: NORMAL H
TME LAST DOSE: NORMAL H

## 2022-05-23 NOTE — RESULT ENCOUNTER NOTE
Tacrolimus level 12.0 at 12 hours, on 5/19/22.  Goal 8-12.   Current dose 3 mg in AM, 2 mg in PM    No change in dose at this time.  [employer] message sent

## 2022-05-24 ENCOUNTER — ANTICOAGULATION THERAPY VISIT (OUTPATIENT)
Dept: ANTICOAGULATION | Facility: CLINIC | Age: 57
End: 2022-05-24
Payer: COMMERCIAL

## 2022-05-24 DIAGNOSIS — I48.91 ATRIAL FIBRILLATION, UNSPECIFIED TYPE (H): Primary | ICD-10-CM

## 2022-05-24 LAB — INR (EXTERNAL): 3.55 (ref 0.9–1.1)

## 2022-05-24 NOTE — PROGRESS NOTES
ANTICOAGULATION MANAGEMENT     Edson Thornton 56 year old male is on warfarin with supratherapeutic INR result. (Goal INR 2.0-3.0)    Recent labs: (last 7 days)     05/24/22  0929   INR 3.55*       ASSESSMENT     Source(s): Chart Review and Patient/Caregiver Call     Warfarin doses taken: Warfarin taken as instructed  Diet: No new diet changes identified  New illness, injury, or hospitalization: No  Medication/supplement changes: None noted  Signs or symptoms of bleeding or clotting: Yes: reports coughing up some blood two days ago and again a little this AM.   Previous INR: Supratherapeutic  Additional findings: Upcoming procedure 6/21/22-procedural plan in process       PLAN     Recommended plan for ongoing change(s) affecting INR     Dosing Instructions: partial hold then decrease your warfarin dose (10.7% change) with next INR in 1 week       Summary  As of 5/24/2022    Full warfarin instructions:  5/24: 2 mg; Otherwise 3 mg every Tue, Thu, Sat; 4 mg all other days   Next INR check:  5/31/2022             Telephone call with Bret who verbalizes understanding and agrees to plan and who agrees to plan and repeated back plan correctly    Patient using outside facility for labs    Education provided: Goal range and significance of current result, Monitoring for bleeding signs and symptoms, When to seek medical attention/emergency care and Contact [phone] with any changes, questions or concerns.     Plan made per [facility] anticoagulation protocol    ESSIE COLÓN RN  Anticoagulation Clinic  5/24/2022    _______________________________________________________________________     Anticoagulation Episode Summary     Current INR goal:  2.0-3.0   TTR:  50.0 % (4.5 mo)   Target end date:  Indefinite   Send INR reminders to:  [facility]    Indications    Atrial fibrillation  unspecified type (H) [I48.91]           Comments:  Zay (p) [phone]  (f) [phone]         Anticoagulation Care Providers      Provider Role Specialty Phone number    Abiodun Woods MD Referring Pulmonary Disease [phone]

## 2022-05-31 ENCOUNTER — ANTICOAGULATION THERAPY VISIT (OUTPATIENT)
Dept: ANTICOAGULATION | Facility: CLINIC | Age: 57
End: 2022-05-31
Payer: COMMERCIAL

## 2022-05-31 DIAGNOSIS — I48.91 ATRIAL FIBRILLATION, UNSPECIFIED TYPE (H): Primary | ICD-10-CM

## 2022-05-31 LAB — INR (EXTERNAL): 3.03 (ref 0.9–1.1)

## 2022-05-31 NOTE — PROGRESS NOTES
ANTICOAGULATION MANAGEMENT     Edson Thornton 57 year old male is on warfarin with supratherapeutic INR result. (Goal INR 2.0-3.0)    Recent labs: (last 7 days)     05/31/22  0000   INR 3.03*       ASSESSMENT     Source(s): Chart Review and Patient/Caregiver Call     Warfarin doses taken: Warfarin taken as instructed  Diet: No new diet changes identified  New illness, injury, or hospitalization: No  Medication/supplement changes: None noted  Signs or symptoms of bleeding or clotting: No  Previous INR: Supratherapeutic  Additional findings: Upcoming surgery/procedure cystoscopy Friday- 6/3-   with Dr Maloney- [phone]  Urology specialists  Will call Dr Allred on wed- 6/1 to inquire about needs for hold.   Pt will take 3mg this evening.spoke with clinic and no hold needed for this in-clinic cystoscopy. If they need to do a more invasive one, they will advise on holds.        PLAN     Recommended plan for no diet, medication or health factor changes affecting INR     Dosing Instructions: decrease your warfarin dose (4% change) with next INR in 1 week       Summary  As of 5/31/2022    Full warfarin instructions:  4 mg every Mon, Wed, Fri; 3 mg all other days   Next INR check:  6/7/2022             Telephone call with Bret who verbalizes understanding and agrees to plan and who agrees to plan and repeated back plan correctly    Patient using outside facility for labs    Education provided: Goal range and significance of current result    Plan made with [facility] Pharmacist Antonella Lopez, RN  Anticoagulation Clinic  5/31/2022    _______________________________________________________________________     Anticoagulation Episode Summary     Current INR goal:  2.0-3.0   TTR:  47.6 % (4.8 mo)   Target end date:  Indefinite   Send INR reminders to:  [facility]    Indications    Atrial fibrillation  unspecified type (H) [I48.91]           Comments:  Zay (p) [phone]  (f) [phone]          Anticoagulation Care Providers     Provider Role Specialty Phone number    Abiodun Woods MD Referring Pulmonary Disease [phone]

## 2022-06-07 ENCOUNTER — ANTICOAGULATION THERAPY VISIT (OUTPATIENT)
Dept: ANTICOAGULATION | Facility: CLINIC | Age: 57
End: 2022-06-07
Payer: COMMERCIAL

## 2022-06-07 ENCOUNTER — TELEPHONE (OUTPATIENT)
Dept: ANTICOAGULATION | Facility: CLINIC | Age: 57
End: 2022-06-07
Payer: COMMERCIAL

## 2022-06-07 DIAGNOSIS — I48.91 ATRIAL FIBRILLATION, UNSPECIFIED TYPE (H): Primary | ICD-10-CM

## 2022-06-07 LAB — INR (EXTERNAL): 3.25 (ref 0.9–1.1)

## 2022-06-07 NOTE — PROGRESS NOTES
ANTICOAGULATION MANAGEMENT     Edson Thornton 57 year old male is on warfarin with supratherapeutic INR result. (Goal INR 2.0-3.0)    Recent labs: (last 7 days)     06/07/22  0000   INR 3.25*       ASSESSMENT     Source(s): Chart Review and Patient/Caregiver Call     Warfarin doses taken: Warfarin taken as instructed  Diet: No new diet changes identified  New illness, injury, or hospitalization: No  Medication/supplement changes: None noted  Signs or symptoms of bleeding or clotting: No  Previous INR: Supratherapeutic  Additional findings: Patient is having a bronch with biopsies on 6/21.  Plan is sent to Dr. Woods and Mady for review and approval.        PLAN     Recommended plan for no diet, medication or health factor changes affecting INR     Dosing Instructions: decrease your warfarin dose (7% change) with next INR in 1 week       Summary  As of 6/7/2022    Full warfarin instructions:  4 mg every Mon; 3 mg all other days   Next INR check:  6/14/2022             Telephone call with [place] who verbalizes understanding and agrees to plan and who agrees to plan and repeated back plan correctly    Patient using outside facility for labs    Education provided: Goal range and significance of current result, Importance of therapeutic range and Importance of notifying clinic of upcoming surgeries and procedures 2 weeks in advance    Plan made per [facility] anticoagulation protocol    Terrie Cedillo RN  Anticoagulation Clinic  6/7/2022    _______________________________________________________________________     Anticoagulation Episode Summary     Current INR goal:  2.0-3.0   TTR:  45.4 % (5 mo)   Target end date:  Indefinite   Send INR reminders to:  [facility]    Indications    Atrial fibrillation  unspecified type (H) [I48.91]           Comments:  Zay (p) [phone]  (f) [phone]         Anticoagulation Care Providers     Provider Role Specialty Phone number    Abiodun Woods MD Referring  Pulmonary Disease [phone]

## 2022-06-07 NOTE — TELEPHONE ENCOUNTER
6/9/22  Approval received from Dr. Woods and transplant team.  Called Bret, Patient verbalized understanding of instructions.  Please review procedure instructions to start holding Warfarin on 6/16/22.  Lovenox starts in Am on 6/18, final injection 6/20 Am.    Sent prescription for new dosing Lovenox (enoxaparin) 0.75mL every 12 hours to [facility] pharmacy.  Bret repeated back instructions on wasting to 0.75 on syringe.    Nick Hunter,RN            NISHA-PROCEDURAL ANTICOAGULATION  MANAGEMENT    ASSESSMENT     Warfarin interruption plan for Bronch with Biopsies on 6/21/22.    Indication for Anticoagulation: Atrial Fibrillation      [id number] = 4 (Hypertension, Diabetes and Stroke-10/2021)      DVT right subclavian while hospitalized (11/4/21)      Recurrent GIB (10-11/2021)      Bridged 4/2022 for bronch      Nisha-Procedure Risk stratification for thromboembolism: moderate (2017 [facility] periprocedure pathway for NVAF Expert Consensus)    NVAF: 2017 [facility] periprocedure pathway for NVAF advises likely bridge for moderate risk stratification with a hx of stroke, TIA or systemic embolism     RECOMMENDATION       Pre-Procedure:  o Hold warfarin for 5 days, until after procedure starting: Thur 6/16   o Enoxaparin (Lovenox) 0.75 mg/kg Q 12 hrs for CrCl 30-60 ml/min per [facility] P&T approved dose adjustment protoco  - Start enoxaparin: Sat 6/18 AM  - Last dose of enoxaparin prior to procedure: Mon 6/20 AM  (~24 hours prior to procedure)      Post-Procedure:  o Resume warfarin dose if okay with provider doing procedure on night of procedure, 6/21 PM: 6 mg daily x 2 then resume current dose  o Resume enoxaparin (Lovenox) ~ 48 hrs post procedure when okay with provider doing procedure. Continue until INR >= 2.0  o Recheck INR 5-6 days after resuming warfarin   ?       Plan routed to referring provider for approval  ?   Antonella Ren, [facility]    SUBJECTIVE/OBJECTIVE     Edson Thornton, a 57 year old male    Goal INR Range:  "2.0-3.0     5/5/22 weight 73.4 kg at [facility] per care everywhere    Wt Readings from Last 3 Encounters:   04/19/22 73.7 kg (162 lb 7.7 oz)   04/18/22 71.7 kg (158 lb)   03/23/22 72.1 kg (159 lb)      Ideal body weight: 59.2 kg (130 lb 8.2 oz)  Adjusted ideal body weight: 65 kg (143 lb 4.8 oz)     Estimated body mass index is 27.89 kg/m  as calculated from the following:    Height as of 4/19/22: 1.626 m (5' 4\").    Weight as of 4/19/22: 73.7 kg (162 lb 7.7 oz).    Lab Results   Component Value Date    INR 3.25 (A) 06/07/2022    INR 3.03 (A) 05/31/2022    INR 3.55 (A) 05/24/2022     Lab Results   Component Value Date    HGB 11.7 04/18/2022    HCT 37.4 04/18/2022     04/18/2022     Creatinine results from [place] via care everywhere:        CrCL:  43 ml/min using adjusted weight 65 kg, creatinine 1.74  "

## 2022-06-07 NOTE — TELEPHONE ENCOUNTER
----- Message -----   From: Mady Marin RN   Sent: 5/18/2022   4:17 PM CDT   To: [facility]   Subject: 6/21 bronchoscopy                                 Hello,     Just wanted to give you guys a heads up that Bret is scheduled for a bronch on 6/21 so he will need to bridge to [facility] and then hold a couple days beforehand (we are doing biopsies).     Let me know if there are any questions.     Thanks!   Mady

## 2022-06-09 RX ORDER — ENOXAPARIN SODIUM 100 MG/ML
INJECTION SUBCUTANEOUS
Qty: 8 ML | Refills: 1 | Status: ON HOLD | OUTPATIENT
Start: 2022-06-09 | End: 2022-06-22

## 2022-06-14 LAB
ACID FAST STAIN (ARUP): NORMAL

## 2022-06-15 ENCOUNTER — ANTICOAGULATION THERAPY VISIT (OUTPATIENT)
Dept: ANTICOAGULATION | Facility: CLINIC | Age: 57
End: 2022-06-15
Payer: COMMERCIAL

## 2022-06-15 DIAGNOSIS — I48.91 ATRIAL FIBRILLATION, UNSPECIFIED TYPE (H): Primary | ICD-10-CM

## 2022-06-15 NOTE — PROGRESS NOTES
ANTICOAGULATION MANAGEMENT     Edson Thornton 57 year old male is on warfarin with subtherapeutic INR result. (Goal INR 2.0-3.0)    Recent labs: (last 7 days)     06/14/22  0826   INR 1.88*       ASSESSMENT     Source(s): Chart Review and Patient/Caregiver Call     Warfarin doses taken: Reviewed in chart  Diet: No new diet changes identified  New illness, injury, or hospitalization: No  Medication/supplement changes: None noted  Signs or symptoms of bleeding or clotting: No  Previous INR: Supratherapeutic  Additional findings: Patient has a procedure scheduled for 6/22.  Patient will start holding warfarin tomorrow in preparation for procedure.        PLAN     Recommended plan for temporary change(s) affecting INR     Dosing Instructions: Start holding warfarin on 6/16 with next INR in 2 weeks       Summary  As of 6/15/2022    Full warfarin instructions:  6/16: Hold; 6/17: Hold; 6/18: Hold; 6/19: Hold; 6/20: Hold; 6/21: 6 mg; Otherwise 4 mg every Mon, Thu; 3 mg all other days   Next INR check:  6/28/2022             Detailed voice message left for Bret with dosing instructions and follow up date.   Sent Bravofly message with dosing and follow up instructions    Patient using outside facility for labs    Education provided: Please call back if any changes to your diet, medications or how you've been taking warfarin and Contact [phone] with any changes, questions or concerns.     Plan made per [facility] anticoagulation protocol    Terrie Cedillo RN  Anticoagulation Clinic  6/15/2022    _______________________________________________________________________     Anticoagulation Episode Summary     Current INR goal:  2.0-3.0   TTR:  46.7 % (5.2 mo)   Target end date:  Indefinite   Send INR reminders to:  [facility]    Indications    Atrial fibrillation  unspecified type (H) [I48.91]           Comments:  Zay (p) [phone]  (f) [phone]         Anticoagulation Care Providers     Provider Role  Specialty Phone number    Abiodun Woods MD Referring Pulmonary Disease [phone]

## 2022-06-16 NOTE — PROGRESS NOTES
Transplant Coordinator Note    Reason for visit: Post lung transplant follow up visit   Coordinator: Present   Caregiver:  grandson    Health concerns addressed today:  1. 2-3 weeks of new/old blood after last bronch.   2. Respiratory - cough after last bronch, resolved. No shortness of breath with activities.   3.  Night sweats - at baseline.   4. GI: appetite good. No nausea/vomiting/diarrhea. No abdominal pain. Regular BMs.   5. ENT: runny nose - clear drainage, at baseline.   6. Saw ophthalmology - working on double visit, next appt August.   7. BG . If get close to 200, use a little novolog     Activity/rehab: up ad [place]  Oxygen needs: room air  Pain management/RX: joint/bone pain (wrist and fingers), sees rheumatology next week. Some swelling. Using Volteran Gel.   Diabetic management: on lantus, occasional novolog  Next Bronch due: 6/21/2022  CMV status: D-/R-  EBV status: D+/R+  AC/asa: on coumadin, bridged to Lovenox for bronch  PJP prophylactic: Bactrim     COVID:  1. COVID-19 infection (yes/no, date of most recent positive test): no  2. Status/instructions given about COVID-19 vaccine: Has received full dose of Evusheld 1/7/2022 and 4/18/22.     Pt Education: medications (use/dose/side effects), how/when to call coordinator, frequency of labs, s/s of infection/rejection, call prior to starting any new medications, lab/vital sign book    Health Maintenance:     Last colonoscopy:     Next colonoscopy due:     Dermatology:    Vaccinations this visit:     Labs, CXR, PFTs reviewed with patient  Medication record reviewed and reconciled  Questions and concerns addressed    Patient Instructions  1. Continue to hydrate with 60-70 oz fluids daily.  2. Continue to exercise daily or most days of the week.  3. Follow up with your primary care provider for annual gender health maintenance procedures.  4. Follow up with colonoscopy schedule.  5. Follow up with annual dermatology visits.  6. It doesn't seem like  the COVID vaccine is working well in lung transplant patients. A number of lung transplant patients have gotten sick with COVID even after receiving the vaccines.  Based on our recent experience, it can be life-threatening to get COVID  even after being vaccinated. Please continue to act like you did not get the COVID vaccine - social distancing, wearing a mask, good hand hygiene, etc. If the people around you are vaccinated, it will help reduce the risk of you getting COVID. All members of your household should be vaccinated.  7. You can stop using Nystatin.   8. Take the dose of Vitamin K today as soon as you get it.   9. Restart Lovenox on Thursday after your Bronchoscopy.     Next transplant clinic appointment: 2 months with CXR, labs and PFTs  Next lab draw: pending tacrolimus level, otherwise Monthly      AVS printed at time of check out

## 2022-06-17 DIAGNOSIS — Z00.6 EXAMINATION OF PARTICIPANT OR CONTROL IN CLINICAL RESEARCH: Primary | ICD-10-CM

## 2022-06-20 ENCOUNTER — LAB (OUTPATIENT)
Dept: LAB | Facility: CLINIC | Age: 57
End: 2022-06-20
Payer: COMMERCIAL

## 2022-06-20 ENCOUNTER — OFFICE VISIT (OUTPATIENT)
Dept: TRANSPLANT | Facility: CLINIC | Age: 57
End: 2022-06-20
Attending: INTERNAL MEDICINE
Payer: COMMERCIAL

## 2022-06-20 ENCOUNTER — HOSPITAL ENCOUNTER (OUTPATIENT)
Facility: CLINIC | Age: 57
End: 2022-06-20
Attending: INTERNAL MEDICINE | Admitting: INTERNAL MEDICINE
Payer: COMMERCIAL

## 2022-06-20 ENCOUNTER — DOCUMENTATION ONLY (OUTPATIENT)
Dept: ANTICOAGULATION | Facility: CLINIC | Age: 57
End: 2022-06-20

## 2022-06-20 VITALS
OXYGEN SATURATION: 97 % | BODY MASS INDEX: 26.29 KG/M2 | HEART RATE: 72 BPM | WEIGHT: 154 LBS | HEIGHT: 64 IN | DIASTOLIC BLOOD PRESSURE: 82 MMHG | SYSTOLIC BLOOD PRESSURE: 130 MMHG

## 2022-06-20 DIAGNOSIS — Z94.2 LUNG REPLACED BY TRANSPLANT (H): ICD-10-CM

## 2022-06-20 DIAGNOSIS — Z79.899 ENCOUNTER FOR LONG-TERM (CURRENT) USE OF HIGH-RISK MEDICATION: ICD-10-CM

## 2022-06-20 DIAGNOSIS — Z94.2 S/P LUNG TRANSPLANT (H): ICD-10-CM

## 2022-06-20 DIAGNOSIS — I48.91 ATRIAL FIBRILLATION, UNSPECIFIED TYPE (H): ICD-10-CM

## 2022-06-20 DIAGNOSIS — N18.31 CHRONIC KIDNEY DISEASE, STAGE 3A (H): ICD-10-CM

## 2022-06-20 DIAGNOSIS — D84.9 IMMUNOSUPPRESSED STATUS (H): ICD-10-CM

## 2022-06-20 DIAGNOSIS — R79.1 ELEVATED INR: Primary | ICD-10-CM

## 2022-06-20 DIAGNOSIS — E83.42 HYPOMAGNESEMIA: ICD-10-CM

## 2022-06-20 DIAGNOSIS — I10 PRIMARY HYPERTENSION: ICD-10-CM

## 2022-06-20 DIAGNOSIS — Z00.6 EXAMINATION OF PARTICIPANT OR CONTROL IN CLINICAL RESEARCH: ICD-10-CM

## 2022-06-20 DIAGNOSIS — Z94.2 S/P LUNG TRANSPLANT (H): Primary | ICD-10-CM

## 2022-06-20 LAB
ANION GAP SERPL CALCULATED.3IONS-SCNC: 10 MMOL/L (ref 3–14)
BUN SERPL-MCNC: 23 MG/DL (ref 7–30)
CALCIUM SERPL-MCNC: 10.1 MG/DL (ref 8.5–10.1)
CHLORIDE BLD-SCNC: 104 MMOL/L (ref 94–109)
CMV DNA SPEC NAA+PROBE-ACNC: NOT DETECTED IU/ML
CO2 SERPL-SCNC: 28 MMOL/L (ref 20–32)
CREAT SERPL-MCNC: 1.46 MG/DL (ref 0.66–1.25)
ERYTHROCYTE [DISTWIDTH] IN BLOOD BY AUTOMATED COUNT: 14.4 % (ref 10–15)
EXPTIME-PRE: 11.35 SEC
FEF2575-%PRED-PRE: 21 %
FEF2575-PRE: 0.6 L/SEC
FEF2575-PRED: 2.78 L/SEC
FEFMAX-%PRED-PRE: 45 %
FEFMAX-PRE: 3.79 L/SEC
FEFMAX-PRED: 8.3 L/SEC
FEV1-%PRED-PRE: 41 %
FEV1-PRE: 1.29 L
FEV1FEV6-PRE: 61 %
FEV1FEV6-PRED: 79 %
FEV1FVC-PRE: 60 %
FEV1FVC-PRED: 79 %
FIFMAX-PRE: 4.63 L/SEC
FVC-%PRED-PRE: 54 %
FVC-PRE: 2.14 L
FVC-PRED: 3.95 L
GFR SERPL CREATININE-BSD FRML MDRD: 56 ML/MIN/1.73M2
GLUCOSE BLD-MCNC: 116 MG/DL (ref 70–99)
HCT VFR BLD AUTO: 35.2 % (ref 40–53)
HGB BLD-MCNC: 11.2 G/DL (ref 13.3–17.7)
HOLD SPECIMEN: NORMAL
INR PPP: 1.18 (ref 0.85–1.15)
MAGNESIUM SERPL-MCNC: 1.6 MG/DL (ref 1.6–2.3)
MCH RBC QN AUTO: 26.7 PG (ref 26.5–33)
MCHC RBC AUTO-ENTMCNC: 31.8 G/DL (ref 31.5–36.5)
MCV RBC AUTO: 84 FL (ref 78–100)
PLATELET # BLD AUTO: 210 10E3/UL (ref 150–450)
POTASSIUM BLD-SCNC: 4 MMOL/L (ref 3.4–5.3)
RBC # BLD AUTO: 4.2 10E6/UL (ref 4.4–5.9)
SARS-COV-2 RNA RESP QL NAA+PROBE: NEGATIVE
SODIUM SERPL-SCNC: 142 MMOL/L (ref 133–144)
TACROLIMUS BLD-MCNC: 10.1 UG/L (ref 5–15)
TME LAST DOSE: NORMAL H
TME LAST DOSE: NORMAL H
WBC # BLD AUTO: 5.5 10E3/UL (ref 4–11)

## 2022-06-20 PROCEDURE — 80048 BASIC METABOLIC PNL TOTAL CA: CPT | Performed by: PATHOLOGY

## 2022-06-20 PROCEDURE — U0003 INFECTIOUS AGENT DETECTION BY NUCLEIC ACID (DNA OR RNA); SEVERE ACUTE RESPIRATORY SYNDROME CORONAVIRUS 2 (SARS-COV-2) (CORONAVIRUS DISEASE [COVID-19]), AMPLIFIED PROBE TECHNIQUE, MAKING USE OF HIGH THROUGHPUT TECHNOLOGIES AS DESCRIBED BY CMS-2020-01-R: HCPCS | Mod: 90 | Performed by: PATHOLOGY

## 2022-06-20 PROCEDURE — 86833 HLA CLASS II HIGH DEFIN QUAL: CPT | Performed by: PATHOLOGY

## 2022-06-20 PROCEDURE — 85610 PROTHROMBIN TIME: CPT | Performed by: PATHOLOGY

## 2022-06-20 PROCEDURE — 80197 ASSAY OF TACROLIMUS: CPT | Performed by: PATHOLOGY

## 2022-06-20 PROCEDURE — 99207 PR RESPIRATORY FLOW VOLUME LOOP: CPT

## 2022-06-20 PROCEDURE — U0005 INFEC AGEN DETEC AMPLI PROBE: HCPCS | Mod: 90 | Performed by: PATHOLOGY

## 2022-06-20 PROCEDURE — 36415 COLL VENOUS BLD VENIPUNCTURE: CPT | Performed by: PATHOLOGY

## 2022-06-20 PROCEDURE — 87799 DETECT AGENT NOS DNA QUANT: CPT | Performed by: PATHOLOGY

## 2022-06-20 PROCEDURE — 85027 COMPLETE CBC AUTOMATED: CPT | Performed by: PATHOLOGY

## 2022-06-20 PROCEDURE — 99000 SPECIMEN HANDLING OFFICE-LAB: CPT | Performed by: PATHOLOGY

## 2022-06-20 PROCEDURE — 86832 HLA CLASS I HIGH DEFIN QUAL: CPT | Performed by: PATHOLOGY

## 2022-06-20 PROCEDURE — 83735 ASSAY OF MAGNESIUM: CPT | Performed by: PATHOLOGY

## 2022-06-20 RX ORDER — PHYTONADIONE 5 MG/1
5 TABLET ORAL ONCE
Qty: 1 TABLET | Refills: 0 | Status: ON HOLD | OUTPATIENT
Start: 2022-06-20 | End: 2022-06-22

## 2022-06-20 RX ORDER — DULOXETIN HYDROCHLORIDE 30 MG/1
60 CAPSULE, DELAYED RELEASE ORAL DAILY
COMMUNITY
Start: 2022-06-20 | End: 2022-09-12

## 2022-06-20 RX ORDER — PREDNISONE 5 MG/1
TABLET ORAL
Qty: 150 TABLET | Refills: 3 | COMMUNITY
Start: 2022-06-20 | End: 2022-08-01

## 2022-06-20 ASSESSMENT — PAIN SCALES - GENERAL: PAINLEVEL: NO PAIN (0)

## 2022-06-20 NOTE — PROGRESS NOTES
Reason for Visit  Edson Thornton is a 57 year old year old male who is being seen for RECHECK (Lung TX)      Assessment and plan: ***    *** *** ***  Assessment and plan:   Edson Thornton is a 56 year old male with NSIP/ILD, bronchiectasis, moderate PH, RA, SALTY, chronic HSV infection, hypogammaglobulinemia, steroid-induced diabetes, hypothyroidism, PFO, HTN, HLD, duodenal anomaly, anxiety, and depression. Admitted on 9/5/21 from OSH for acute on chronic respiratory failure 2/2 ILD exacerbation now s/p BSLT on 10/16/21. Prolonged vent wean s/p trach and PEG/J tube.  Post-op course also complicated by encephalopathy and diffuse weakness, acute to subacute CVA, afib with RVR, BRENNAN, GI bleed, Candidemia/Candida empyema, and anxiety. Code called 11/2 for bradycardia/asystole, progressive hypotension, found to have significant GI bleed, EGD with adherent clot near PEG tube site that was clipped.  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     Pulmonary/lung transplant: The patient reports gradual improvement in his exercise tolerance but still fairly limited.  Slight increase in cough and sputum.  Oxygenation is adequate.  PFTs are stable but far lower than would be expected at this point following transplantation.  Is unclear why PFTs remain low.  Previous sniff test showed normal diaphragm function.  The patient is due for his next surveillance biopsy tomorrow.  He will proceed as planned.  (INR is mildly elevated, the patient will be given 5 mg of vitamin K today and INR will be rechecked tomorrow.)  With his new cough and sputum, if any significant organisms grow from lavage, we will initiate a course of antibiotics.  Otherwise he will continue his current immunosuppression.  His last prednisone taper will be due on April 24.  Tacrolimus will be adjusted to maintain a level of  8-12.        Positive crossmatch: The patient had a retrospective positive crossmatch following transplantation, attributed to rituximab received in June.  Subsequent DSA is positive, initially increasing but now fairly steady and below the usual threshold for treatment.  Date DSA mfi Biopsy (date) Treatment   10/17/2021  none         10/23/2021  none         11/1/2021  none         11/15/2021  none         11/29/2021  DQ B5  2522       12/6/2021  DQ B5  1873       12/12/2021  DQ B5  1703       12/27/2021  DQ B5  3924       1/3/2022  DQ B5  3072       1/10/2022  DQ B5  4032       1/17/2022  DQB5  1849       2/3/2022 DQB5   2488       2/7/2022 DQB5  1874       2/10/2022 DQB5  1781       3/15/2022 DQB5  2254       3/21/2022 DQB5  2117                                                 Prophylaxis: The patient is CMV -/-.  Continue monitoring.  Bactrim for PJP prophylaxis.     Ophthalmology: Double vision and blind spots.  Continue ophthalmology follow-ups.     Rheumatoid arthritis: Recent rheumatology follow-up suggested that current immunosuppression should be adequate for control of rheumatologic complaints although if joint pains worsen he will follow-up with rheumatology.     Atrial fibrillation with RVR: The patient appears to be in sinus rhythm on exam today.  He has completed his course of amiodarone.  Continue metoprolol.  Continue warfarin.  Follow-up with EP.     Disseminated Candida: Candidemia in October 2021 with candidal empyema.  The patient has completed his course of fluconazole.     Recurrent HSV: Resolved.  If further recurrence may require chronic suppressive therapy.     Hypogammaglobulinemia: Last replaced in November 2021.  Continue monitoring.     Obstructive sleep apnea: Continue CPAP.  Consider follow-up sleep study when fully recovered.     Hypomagnesemia: Magnesium level is low but the patient is already on replacement.  Continue monitoring.     Hypertension: Blood pressure is elevated in  clinic but home monitoring suggest blood pressure is adequately controlled generally 119-130/70s.  Continue current amlodipine and metoprolol.     Depression/anxiety: Adequately controlled with Lexapro.     History of GI bleed: Continue PPI.     Steroid-induced hyperglycemia: Blood sugars adequately controlled with current insulin regimen.     Hypothyroidism: Continue current levothyroxine.     Multifocal acute to subacute ischemic stroke: etiology likely embolic vs perioperative. MRI brain 10/23 with infarcts noted in both cerebral hemispheres, left cerebellum, presumed associated with new Afib vs perioperative. Bilateral upper extremity paresis (R>L), suspicion for some cortical blindness. SBP goal < 140.   - Continue warfarin, HTN and BS control and rosuvastatin        Healthcare maintenance: Patient will receive his second dose of Evusheld today.  COVID vaccination in 6 months.  He is due for additional vaccines.  He will receive the Prevnar 20 with his next visit and Pneumovax 23 8 weeks later.  Then he will receive his hepatitis B vaccine series.     Follow-up in 2 months with labs, x-ray and PFTs and bronchoscopy.     IAbiodun, have spent 44 minutes on the day of the visit to review the chart, interview and examine the patient, review labs and imaging, formulate a plan, document and submit orders. Time documented is excluding time spent for PFT interpretation.      Abiodun Woods MD         Lung TX HPI  Transplants:  10/16/2021 (Lung), Postoperative day:  247    The patient was seen and examined by Abiodun Woods MD     A complete ROS was otherwise negative except as noted in the HPI.    Current Outpatient Medications   Medication     acetaminophen (TYLENOL) 325 MG tablet     amLODIPine (NORVASC) 10 MG tablet     calcium carbonate 600 mg-vitamin D 400 units (CALTRATE) 600-400 MG-UNIT per tablet     diclofenac (VOLTAREN) 1 % topical gel     DULoxetine (CYMBALTA) 30 MG capsule      enoxaparin ANTICOAGULANT (LOVENOX) 80 MG/0.8ML syringe     enoxaparin ANTICOAGULANT (LOVENOX) 80 MG/0.8ML syringe     fluticasone (FLONASE) 50 MCG/ACT nasal spray     insulin glargine (LANTUS PEN) 100 UNIT/ML pen     levothyroxine (SYNTHROID/LEVOTHROID) 25 MCG tablet     Magnesium Glycinate POWD     Melatonin 10 MG TABS tablet     metoprolol tartrate (LOPRESSOR) 25 MG tablet     multivitamin w/minerals (THERA-VIT-M) tablet     MYFORTIC (BRAND) 360 MG EC tablet     nystatin (MYCOSTATIN) 359855 UNIT/ML suspension     ondansetron (ZOFRAN-ODT) 4 MG ODT tab     pantoprazole (PROTONIX) 40 MG EC tablet     phytonadione (MEPHYTON) 5 MG tablet     predniSONE (DELTASONE) 5 MG tablet     rosuvastatin (CRESTOR) 10 MG tablet     senna-docusate (SENOKOT-S/PERICOLACE) 8.6-50 MG tablet     sulfamethoxazole-trimethoprim (BACTRIM) 400-80 MG tablet     tacrolimus (GENERIC EQUIVALENT) 1 MG capsule     tamsulosin (FLOMAX) 0.4 MG capsule     warfarin ANTICOAGULANT (COUMADIN) 1 MG tablet     warfarin ANTICOAGULANT (COUMADIN) 1 MG tablet     warfarin ANTICOAGULANT (COUMADIN) 2 MG tablet     zolpidem (AMBIEN) 10 MG tablet     levalbuterol (XOPENEX HFA) 45 MCG/ACT inhaler     No current facility-administered medications for this visit.     No Known Allergies  Past Medical History:   Diagnosis Date     BRENNAN (acute kidney injury) (H) 10/17/2021     Anxiety      Depression      HLD (hyperlipidemia)      HTN (hypertension)      Hypothyroidism      ILD (interstitial lung disease) (H)      SALTY on CPAP      Oxygen dependent     BL 4L since ~6/2021     Primary hypertension 2/28/2022     Rheumatoid arthritis (H)     signs ~5/2020, dx 5/2021     S/P lung transplant (H) 10/16/2021     Shock liver 10/17/2021     Steroid-induced hyperglycemia      Traction bronchiectasis (H)        Past Surgical History:   Procedure Laterality Date     BRONCHOSCOPY (RIGID OR FLEXIBLE), DIAGNOSTIC N/A 1/26/2022    Procedure: BRONCHOSCOPY, WITH BRONCHOALVEOLAR LAVAGE AND  BIOPSY;  Surgeon: Perlman, David Morris, MD;  Location:  GI     BRONCHOSCOPY (RIGID OR FLEXIBLE), DIAGNOSTIC N/A 4/19/2022    Procedure: BRONCHOSCOPY, WITH BRONCHOALVEOLAR LAVAGE AND BIOPSY;  Surgeon: Sherine Merrill MD;  Location:  GI     COLONOSCOPY W/ BIOPSIES AND POLYPECTOMY  07/21/2020     CV CORONARY ANGIOGRAM N/A 09/08/2021    Procedure: Coronary Angiogram with possible intervention;  Surgeon: Jovon Bullock MD;  Location:  HEART CARDIAC CATH LAB     CV RIGHT HEART CATH MEASUREMENTS RECORDED N/A 09/08/2021    Procedure: Right Heart Cath;  Surgeon: Jovon Bullock MD;  Location:  HEART CARDIAC CATH LAB     ESOPHAGOSCOPY, GASTROSCOPY, DUODENOSCOPY (EGD), COMBINED N/A 10/23/2021    Procedure: ESOPHAGOGASTRODUODENOSCOPY (EGD);  Surgeon: Miquel Pisano MD;  Location:  GI     ESOPHAGOSCOPY, GASTROSCOPY, DUODENOSCOPY (EGD), COMBINED N/A 11/02/2021    Procedure: ESOPHAGOGASTRODUODENOSCOPY (EGD);  Surgeon: Daniel Ortiz MD;  Location:  GI     ESOPHAGOSCOPY, GASTROSCOPY, DUODENOSCOPY (EGD), COMBINED N/A 11/5/2021    Procedure: ESOPHAGOGASTRODUODENOSCOPY (EGD);  Surgeon: Ronnell Hernandez MD;  Location:  GI     EXTRACTION(S) DENTAL N/A 09/22/2021    Procedure: EXTRACTION tooth #19;  Surgeon: Deepak Tobin DDS;  Location:  OR     HERNIA REPAIR       IR CHEST TUBE PLACEMENT NON-TUNNELLED LEFT  11/03/2021     PICC TRIPLE LUMEN PLACEMENT Left 11/04/2021    5FR TL PICC. Right non occlusive thrombus subclavian vein.     REPLACE GASTROJEJUNOSTOMY TUBE, PERCUTANEOUS N/A 11/9/2021    Procedure: REPLACEMENT, GASTROJEJUNOSTOMY TUBE, PERCUTANEOUS;  Surgeon: Hernando Rodriguez MD;  Location:  GI     right acl       TRACHEOSTOMY PERCUTANEOUS N/A 10/29/2021    Procedure: Percutaneous Tracheostomy,;  Surgeon: Celine Jenkins MD;  Location: [facility]     TRANSPLANT LUNG RECIPIENT SINGLE X2 Bilateral 10/16/2021    Procedure: TRANSPLANT, LUNG, RECIPIENT, BILATERAL,  "Bronchoscopy, on-pump perfusion, bilateral clamshell sternotomy;  Surgeon: Yanick Corral MD;  Location: [facility] OR     XR ACROMIOCLAVICULAR JOINT BILATERAL         Social History     Socioeconomic History     Marital status: Single     Spouse name: Not on file     Number of children: Not on file     Years of education: Not on file     Highest education level: Not on file   Occupational History     Not on file   Tobacco Use     Smoking status: Never Smoker     Smokeless tobacco: Never Used   Substance and Sexual Activity     Alcohol use: Never     Drug use: Never     Sexual activity: Not on file   Other Topics Concern     Parent/sibling w/ CABG, MI or angioplasty before 65F 55M? Not Asked   Social History Narrative     Not on file     Social Determinants of Health     Financial Resource Strain: Not on file   Food Insecurity: Not on file   Transportation Needs: Not on file   Physical Activity: Not on file   Stress: Not on file   Social Connections: Not on file   Intimate Partner Violence: Not on file   Housing Stability: Not on file         /82   Pulse 72   Ht 1.626 m (5' 4\")   Wt 69.9 kg (154 lb)   SpO2 97%   BMI 26.43 kg/m    Body mass index is 26.43 kg/m .  Exam:   GENERAL APPEARANCE: Well developed, well nourished, alert, and in no apparent distress.  EYES: PERRL, EOMI  EARS: Canals clear, TMs normal  MOUTH: Oral mucosa is moist, without any lesions, no tonsillar enlargement, no oropharyngeal exudate.  NECK: supple, no masses, no thyromegaly.  LYMPHATICS: No significant axillary, cervical, or supraclavicular nodes.  RESP: normal percussion, good air flow throughout.  No crackles. No rhonchi.  Right sided wheeze, cleared with cough.  CV: Normal S1, S2, regular rhythm, normal rate. No murmur.  No rub. No gallop. No LE edema.   ABDOMEN:  Bowel sounds normal, soft, nontender, no HSM or masses.   MS: extremities normal. No clubbing. No cyanosis.  SKIN: no rash on limited exam  NEURO: Mentation intact, " speech normal, normal strength and tone, normal gait and stance  PSYCH: mentation appears normal. and affect normal/bright  Results:  Recent Results (from the past 168 hour(s))   INR    Collection Time: 06/14/22  8:26 AM   Result Value Ref Range    PT - Prothrombine Time (External) 21.3 (H) 12.0 - 14.5 secs    INR (External) 1.88 (H) 0.90 - 1.10   Basic metabolic panel    Collection Time: 06/14/22  8:26 AM   Result Value Ref Range    Glucose (External) 122 (H) 70 - 99 mg/dL    Urea Nitrogen (External) 23 (H) 6 - 22 mg/dL    Creatinine (External) 1.52 (H) 0.73 - 1.18 mg/dL    BUN/Creatinine Ratio (External) 15.1 10.0 - 25.0    Sodium (External) 144 136 - 145 meq/L    Potassium (External) 4.6 3.5 - 5.1 meq/L    Chloride (External) (External) 107 98 - 109 meq/L    CO2 (External) 28 22 - 31 meq/L    Anion Gap (External) 14 6 - 20 meq/L    Calcium (External) 10.2 8.5 - 10.5 mg/dL    GFR Estimated (External) 48 (L) >=60 ml/min/1.73m2   Magnesium    Collection Time: 06/14/22  8:26 AM   Result Value Ref Range    Magnesium (External) 1.7 1.6 - 2.6 mg/dL   CBC with Platelets & Differential    Collection Time: 06/14/22  8:26 AM   Result Value Ref Range    WBC Count (External) 6.7 4.0 - 11.0 K/uL    RBC Count (External) 4.43 4.40 - 5.80 M/uL    Hemoglobin (External) 11.9 (L) 13.5 - 17.5 g/dL    Hematocrit (External) 36.9 (L) 40.0 - 50.0 %    MCV (External) 83.3 80.0 - 98.0 fL    MCH (External) 26.9 25.5 - 34.0 pg    MCHC (External) 32.2 31.5 - 36.5 g/dL    RDW (External) 14.5 11.5 - 15.5 %    Platelet Count (External) 241 140 - 400 K/uL    Absolute Neutrophils (External) 4.4 1.8 - 8.0 K/uL    Absolute Lymphocytes (External) 1.4 0.8 - 4.1 K/uL    Absolute Monocytes (External) 0.8 0.0 - 1.0 K/uL    Absolute Eosinophils (External) 0.1 0.0 - 0.7 K/uL    Absolute Basophils (External) 0.0 0.0 - 0.2 K/uL    Absolute Immature Granulocytes (External) 0.14 (H) 0.00 - 0.06 K/uL    % Neutrophils (External) 65.5 %    % Lymphocytes  (External) 20.1 %    % Monocytes (External) 11.1 %    % Immature Granulocytes (External) 2.1 %    % Eosinophils (External) 0.9 %    % Basophils (External) 0.3 %    Nucleated RBCs (External) 0 0 - 1 /100 WBC   CMV Quantitative, PCR    Collection Time: 06/14/22  8:26 AM    Specimen: Blood   Result Value Ref Range    CMV DNA Quant (External) Undetected Undetected IU/mL    Log IU/ML of CMVQNT (External) Undetected log IU/mL   INR    Collection Time: 06/20/22  7:57 AM   Result Value Ref Range    INR 1.18 (H) 0.85 - 1.15   Magnesium    Collection Time: 06/20/22  7:57 AM   Result Value Ref Range    Magnesium 1.6 1.6 - 2.3 mg/dL   Basic metabolic panel    Collection Time: 06/20/22  7:57 AM   Result Value Ref Range    Sodium 142 133 - 144 mmol/L    Potassium 4.0 3.4 - 5.3 mmol/L    Chloride 104 94 - 109 mmol/L    Carbon Dioxide (CO2) 28 20 - 32 mmol/L    Anion Gap 10 3 - 14 mmol/L    Urea Nitrogen 23 7 - 30 mg/dL    Creatinine 1.46 (H) 0.66 - 1.25 mg/dL    Calcium 10.1 8.5 - 10.1 mg/dL    Glucose 116 (H) 70 - 99 mg/dL    GFR Estimate 56 (L) >60 mL/min/1.73m2   CBC with platelets    Collection Time: 06/20/22  7:57 AM   Result Value Ref Range    WBC Count 5.5 4.0 - 11.0 10e3/uL    RBC Count 4.20 (L) 4.40 - 5.90 10e6/uL    Hemoglobin 11.2 (L) 13.3 - 17.7 g/dL    Hematocrit 35.2 (L) 40.0 - 53.0 %    MCV 84 78 - 100 fL    MCH 26.7 26.5 - 33.0 pg    MCHC 31.8 31.5 - 36.5 g/dL    RDW 14.4 10.0 - 15.0 %    Platelet Count 210 150 - 450 10e3/uL   Extra Purple Top Tube    Collection Time: 06/20/22  7:57 AM   Result Value Ref Range    Hold Specimen [facility]    General PFT Lab (Please always keep checked)    Collection Time: 06/20/22  8:15 AM   Result Value Ref Range    FVC-Pred 3.95 L    FVC-Pre 2.14 L    FVC-%Pred-Pre 54 %    FEV1-Pre 1.29 L    FEV1-%Pred-Pre 41 %    FEV1FVC-Pred 79 %    FEV1FVC-Pre 60 %    FEFMax-Pred 8.30 L/sec    FEFMax-Pre 3.79 L/sec    FEFMax-%Pred-Pre 45 %    FEF2575-Pred 2.78 L/sec    FEF2575-Pre 0.60 L/sec     UVJ3539-%Pred-Pre 21 %    ExpTime-Pre 11.35 sec    FIFMax-Pre 4.63 L/sec    FEV1FEV6-Pred 79 %    FEV1FEV6-Pre 61 %                         Results as noted above.    PFT Interpretation:  {Chuck PFT interpretation:344668}  {Increase/decrease:214512}  {JDPFT:181093}  Valid Maneuver

## 2022-06-20 NOTE — PROGRESS NOTES
6/21 bronchoscopy with lavage and biopsies.   COVID test: in process (done at 0754 6/20)    Date of transplant 10/16/2021    Transplant type bilateral lung transplant  Date of labs 6/20/2021  BUN 23 DDAVP no  Plt 210  INR 1.18 Anticoag therapy coumadin/Lovenox Last dose of lovenox 6/19  Comment patient INR 1.18 on 6/20, patient given a dose of Vitamin K. Level recheck tomorrow prior to bronch.   Questions/concerns please page Dr. Woods at [phone]

## 2022-06-20 NOTE — PROGRESS NOTES
No called made with INR 1.18 per patient is holding his Warfarin and bridging with Lovenox 75mg Q 12 Hours for upcoming bronch on 6/21.    Suni Choi, RN   Anticoagulation Nurse   [facility] [phone]

## 2022-06-20 NOTE — NURSING NOTE
"  Chief Complaint   Patient presents with     RECHECK     Lung TX     /82   Pulse 72   Ht 1.626 m (5' 4\")   Wt 69.9 kg (154 lb)   SpO2 97%   BMI 26.43 kg/m        Chito Villalobos MA    "

## 2022-06-20 NOTE — PATIENT INSTRUCTIONS
Patient Instructions  1. Continue to hydrate with 60-70 oz fluids daily.  2. Continue to exercise daily or most days of the week.  3. Follow up with your primary care provider for annual gender health maintenance procedures.  4. Follow up with colonoscopy schedule.  5. Follow up with annual dermatology visits.  6. It doesn't seem like the COVID vaccine is working well in lung transplant patients. A number of lung transplant patients have gotten sick with COVID even after receiving the vaccines.  Based on our recent experience, it can be life-threatening to get COVID  even after being vaccinated. Please continue to act like you did not get the COVID vaccine - social distancing, wearing a mask, good hand hygiene, etc. If the people around you are vaccinated, it will help reduce the risk of you getting COVID. All members of your household should be vaccinated.  7. You can stop using Nystatin.   8. Take the dose of Vitamin K today as soon as you get it.   9. Restart Lovenox on Thursday after your Bronchoscopy.     Next transplant clinic appointment: 2 months with CXR, labs and PFTs  Next lab draw: pending tacrolimus level, otherwise Monthly    You are scheduled for a bronchoscopy on 6/21 at 9AM at the [facility] on the 3rd floor. Arrive 1 hour early (8AM)    Instructions     1. Nothing to eat or drink 8 hours before procedure.  2. Hold your morning medications. Bring them with you to take after the procedure.  3. Have a  available to take you home.   4. If you are a diabetic - hold lantus until after your bronch  5. If you are taking coumadin you will need contact your coumadin clinic for instructions. Restart  Lovenox on Thursday    ~~~~~~~~~~~~~~~~~~~~~~~~~    Thoracic Transplant Office phone [phone], fax [phone]    Office Hours 8:30 - 5:00     For after-hours urgent issues, please dial [phone], and ask to speak with the Thoracic Transplant Coordinator  On-Call.  --------------------  To expedite your medication refill(s), please contact your pharmacy and have them fax a refill request to: [phone]  .   *Please allow 3 business days for routine medication refills.  *Please allow 5 business days for controlled substance medication refills.    **For Diabetic medications and supplies refill(s), please contact your pharmacy and have them contact your Endocrine team.  --------------------  For scheduling appointments call [phone].  --------------------  Please Note: If you are active on [employer], all future test results will be sent by [employer] message only, and will no longer be called to patient. You may also receive communication directly from your physician.

## 2022-06-21 ENCOUNTER — APPOINTMENT (OUTPATIENT)
Dept: GENERAL RADIOLOGY | Facility: CLINIC | Age: 57
End: 2022-06-21
Attending: INTERNAL MEDICINE
Payer: COMMERCIAL

## 2022-06-21 ENCOUNTER — HOSPITAL ENCOUNTER (OUTPATIENT)
Facility: CLINIC | Age: 57
Setting detail: OBSERVATION
Discharge: HOME | End: 2022-06-22
Attending: INTERNAL MEDICINE | Admitting: INTERNAL MEDICINE
Payer: COMMERCIAL

## 2022-06-21 DIAGNOSIS — R04.89: Primary | ICD-10-CM

## 2022-06-21 LAB
ANION GAP SERPL CALCULATED.3IONS-SCNC: 11 MMOL/L (ref 7–15)
APPEARANCE FLD: CLEAR
APTT PPP: 31 SECONDS (ref 22–38)
BRONCHOSCOPY: NORMAL
BUN SERPL-MCNC: 24.3 MG/DL (ref 6–20)
C PNEUM DNA SPEC QL NAA+PROBE: NOT DETECTED
CALCIUM SERPL-MCNC: 10.1 MG/DL (ref 8.6–10)
CELL COUNT BODY FLUID SOURCE: NORMAL
CHLORIDE SERPL-SCNC: 108 MMOL/L (ref 98–107)
CMV DNA SPEC NAA+PROBE-ACNC: NOT DETECTED IU/ML
COLOR FLD: COLORLESS
CREAT SERPL-MCNC: 1.24 MG/DL (ref 0.67–1.17)
DEPRECATED HCO3 PLAS-SCNC: 23 MMOL/L (ref 22–29)
EBV DNA # SPEC NAA+PROBE: NOT DETECTED COPIES/ML
EOSINOPHIL NFR FLD MANUAL: 1 %
ERYTHROCYTE [DISTWIDTH] IN BLOOD BY AUTOMATED COUNT: 14.7 % (ref 10–15)
FLUAV H1 2009 PAND RNA SPEC QL NAA+PROBE: NOT DETECTED
FLUAV H1 RNA SPEC QL NAA+PROBE: NOT DETECTED
FLUAV H3 RNA SPEC QL NAA+PROBE: NOT DETECTED
FLUAV RNA SPEC QL NAA+PROBE: NOT DETECTED
FLUBV RNA SPEC QL NAA+PROBE: NOT DETECTED
GFR SERPL CREATININE-BSD FRML MDRD: 68 ML/MIN/1.73M2
GLUCOSE BLDC GLUCOMTR-MCNC: 110 MG/DL (ref 70–99)
GLUCOSE BLDC GLUCOMTR-MCNC: 119 MG/DL (ref 70–99)
GLUCOSE BLDC GLUCOMTR-MCNC: 130 MG/DL (ref 70–99)
GLUCOSE SERPL-MCNC: 126 MG/DL (ref 70–99)
GRAM STAIN RESULT: NORMAL
GRAM STAIN RESULT: NORMAL
HADV DNA SPEC QL NAA+PROBE: NOT DETECTED
HCOV PNL SPEC NAA+PROBE: NOT DETECTED
HCT VFR BLD AUTO: 32.7 % (ref 40–53)
HGB BLD-MCNC: 10.3 G/DL (ref 13.3–17.7)
HMPV RNA SPEC QL NAA+PROBE: NOT DETECTED
HPIV1 RNA SPEC QL NAA+PROBE: NOT DETECTED
HPIV2 RNA SPEC QL NAA+PROBE: NOT DETECTED
HPIV3 RNA SPEC QL NAA+PROBE: NOT DETECTED
HPIV4 RNA SPEC QL NAA+PROBE: NOT DETECTED
INR PPP: 1.07 (ref 0.85–1.15)
LYMPHOCYTES NFR FLD MANUAL: 13 %
M PNEUMO DNA SPEC QL NAA+PROBE: NOT DETECTED
MCH RBC QN AUTO: 26.6 PG (ref 26.5–33)
MCHC RBC AUTO-ENTMCNC: 31.5 G/DL (ref 31.5–36.5)
MCV RBC AUTO: 85 FL (ref 78–100)
MONOS+MACROS NFR FLD MANUAL: 83 %
NEUTS BAND NFR FLD MANUAL: 5 %
PATH REPORT.COMMENTS IMP SPEC: NORMAL
PATH REPORT.COMMENTS IMP SPEC: NORMAL
PATH REPORT.FINAL DX SPEC: NORMAL
PATH REPORT.GROSS SPEC: NORMAL
PATH REPORT.MICROSCOPIC SPEC OTHER STN: NORMAL
PATH REPORT.RELEVANT HX SPEC: NORMAL
PHOTO IMAGE: NORMAL
PLATELET # BLD AUTO: 206 10E3/UL (ref 150–450)
POTASSIUM SERPL-SCNC: 4.9 MMOL/L (ref 3.4–4.5)
RBC # BLD AUTO: 3.87 10E6/UL (ref 4.4–5.9)
RSV RNA SPEC QL NAA+PROBE: NOT DETECTED
RSV RNA SPEC QL NAA+PROBE: NOT DETECTED
RV+EV RNA SPEC QL NAA+PROBE: NOT DETECTED
SODIUM SERPL-SCNC: 142 MMOL/L (ref 136–145)
WBC # BLD AUTO: 7.3 10E3/UL (ref 4–11)
WBC # FLD AUTO: 152 /UL

## 2022-06-21 PROCEDURE — 80048 BASIC METABOLIC PNL TOTAL CA: CPT | Performed by: STUDENT IN AN ORGANIZED HEALTH CARE EDUCATION/TRAINING PROGRAM

## 2022-06-21 PROCEDURE — 31628 BRONCHOSCOPY/LUNG BX EACH: CPT | Mod: GC | Performed by: INTERNAL MEDICINE

## 2022-06-21 PROCEDURE — 88108 CYTOPATH CONCENTRATE TECH: CPT | Mod: 26 | Performed by: PATHOLOGY

## 2022-06-21 PROCEDURE — 71045 X-RAY EXAM CHEST 1 VIEW: CPT | Mod: 26 | Performed by: RADIOLOGY

## 2022-06-21 PROCEDURE — 82962 GLUCOSE BLOOD TEST: CPT

## 2022-06-21 PROCEDURE — 250N000013 HC RX MED GY IP 250 OP 250 PS 637: Performed by: STUDENT IN AN ORGANIZED HEALTH CARE EDUCATION/TRAINING PROGRAM

## 2022-06-21 PROCEDURE — 88305 TISSUE EXAM BY PATHOLOGIST: CPT | Mod: TC | Performed by: INTERNAL MEDICINE

## 2022-06-21 PROCEDURE — 87107 FUNGI IDENTIFICATION MOLD: CPT | Performed by: INTERNAL MEDICINE

## 2022-06-21 PROCEDURE — 99220 PR INITIAL OBSERVATION CARE,LEVEL III: CPT | Mod: GC | Performed by: STUDENT IN AN ORGANIZED HEALTH CARE EDUCATION/TRAINING PROGRAM

## 2022-06-21 PROCEDURE — 31632 BRONCHOSCOPY/LUNG BX ADDL: CPT | Mod: GC | Performed by: INTERNAL MEDICINE

## 2022-06-21 PROCEDURE — 99152 MOD SED SAME PHYS/QHP 5/>YRS: CPT | Mod: GC | Performed by: INTERNAL MEDICINE

## 2022-06-21 PROCEDURE — 250N000009 HC RX 250: Performed by: INTERNAL MEDICINE

## 2022-06-21 PROCEDURE — 87070 CULTURE OTHR SPECIMN AEROBIC: CPT | Performed by: INTERNAL MEDICINE

## 2022-06-21 PROCEDURE — G0500 MOD SEDAT ENDO SERVICE >5YRS: HCPCS | Performed by: INTERNAL MEDICINE

## 2022-06-21 PROCEDURE — 31624 DX BRONCHOSCOPE/LAVAGE: CPT | Mod: GC | Performed by: INTERNAL MEDICINE

## 2022-06-21 PROCEDURE — G0378 HOSPITAL OBSERVATION PER HR: HCPCS

## 2022-06-21 PROCEDURE — 250N000012 HC RX MED GY IP 250 OP 636 PS 637: Performed by: STUDENT IN AN ORGANIZED HEALTH CARE EDUCATION/TRAINING PROGRAM

## 2022-06-21 PROCEDURE — 87205 SMEAR GRAM STAIN: CPT | Performed by: INTERNAL MEDICINE

## 2022-06-21 PROCEDURE — 87206 SMEAR FLUORESCENT/ACID STAI: CPT | Performed by: INTERNAL MEDICINE

## 2022-06-21 PROCEDURE — 999N000065 XR CHEST PORT 1 VIEW

## 2022-06-21 PROCEDURE — 88312 SPECIAL STAINS GROUP 1: CPT | Mod: 26 | Performed by: PATHOLOGY

## 2022-06-21 PROCEDURE — 88305 TISSUE EXAM BY PATHOLOGIST: CPT | Mod: 26 | Performed by: PATHOLOGY

## 2022-06-21 PROCEDURE — 88312 SPECIAL STAINS GROUP 1: CPT | Mod: TC | Performed by: INTERNAL MEDICINE

## 2022-06-21 PROCEDURE — 36415 COLL VENOUS BLD VENIPUNCTURE: CPT | Performed by: STUDENT IN AN ORGANIZED HEALTH CARE EDUCATION/TRAINING PROGRAM

## 2022-06-21 PROCEDURE — 87102 FUNGUS ISOLATION CULTURE: CPT | Mod: 91 | Performed by: INTERNAL MEDICINE

## 2022-06-21 PROCEDURE — 85027 COMPLETE CBC AUTOMATED: CPT | Performed by: STUDENT IN AN ORGANIZED HEALTH CARE EDUCATION/TRAINING PROGRAM

## 2022-06-21 PROCEDURE — 250N000011 HC RX IP 250 OP 636: Performed by: INTERNAL MEDICINE

## 2022-06-21 PROCEDURE — 87486 CHLMYD PNEUM DNA AMP PROBE: CPT | Performed by: INTERNAL MEDICINE

## 2022-06-21 PROCEDURE — 85730 THROMBOPLASTIN TIME PARTIAL: CPT | Performed by: INTERNAL MEDICINE

## 2022-06-21 PROCEDURE — 89051 BODY FLUID CELL COUNT: CPT | Performed by: INTERNAL MEDICINE

## 2022-06-21 PROCEDURE — 87116 MYCOBACTERIA CULTURE: CPT | Performed by: INTERNAL MEDICINE

## 2022-06-21 PROCEDURE — 85610 PROTHROMBIN TIME: CPT | Performed by: INTERNAL MEDICINE

## 2022-06-21 PROCEDURE — 87633 RESP VIRUS 12-25 TARGETS: CPT | Performed by: INTERNAL MEDICINE

## 2022-06-21 PROCEDURE — 31624 DX BRONCHOSCOPE/LAVAGE: CPT | Performed by: INTERNAL MEDICINE

## 2022-06-21 PROCEDURE — 31625 BRONCHOSCOPY W/BIOPSY(S): CPT

## 2022-06-21 PROCEDURE — 99153 MOD SED SAME PHYS/QHP EA: CPT | Performed by: INTERNAL MEDICINE

## 2022-06-21 RX ORDER — TAMSULOSIN HYDROCHLORIDE 0.4 MG/1
0.4 CAPSULE ORAL DAILY
Status: DISCONTINUED | OUTPATIENT
Start: 2022-06-21 | End: 2022-06-22 | Stop reason: HOSPADM

## 2022-06-21 RX ORDER — DEXTROSE MONOHYDRATE 25 G/50ML
25-50 INJECTION, SOLUTION INTRAVENOUS
Status: DISCONTINUED | OUTPATIENT
Start: 2022-06-21 | End: 2022-06-22 | Stop reason: HOSPADM

## 2022-06-21 RX ORDER — AMLODIPINE BESYLATE 10 MG/1
10 TABLET ORAL AT BEDTIME
Status: DISCONTINUED | OUTPATIENT
Start: 2022-06-21 | End: 2022-06-22 | Stop reason: HOSPADM

## 2022-06-21 RX ORDER — MYCOPHENOLIC ACID 180 MG/1
720 TABLET, DELAYED RELEASE ORAL 2 TIMES DAILY
Status: DISCONTINUED | OUTPATIENT
Start: 2022-06-21 | End: 2022-06-22 | Stop reason: HOSPADM

## 2022-06-21 RX ORDER — NICOTINE POLACRILEX 4 MG
15-30 LOZENGE BUCCAL
Status: DISCONTINUED | OUTPATIENT
Start: 2022-06-21 | End: 2022-06-22 | Stop reason: HOSPADM

## 2022-06-21 RX ORDER — TACROLIMUS 1 MG/1
2 CAPSULE ORAL
Status: DISCONTINUED | OUTPATIENT
Start: 2022-06-21 | End: 2022-06-22 | Stop reason: HOSPADM

## 2022-06-21 RX ORDER — IPRATROPIUM BROMIDE AND ALBUTEROL SULFATE 2.5; .5 MG/3ML; MG/3ML
3 SOLUTION RESPIRATORY (INHALATION) ONCE
Status: COMPLETED | OUTPATIENT
Start: 2022-06-21 | End: 2022-06-21

## 2022-06-21 RX ORDER — CODEINE PHOSPHATE AND GUAIFENESIN 10; 100 MG/5ML; MG/5ML
10 SOLUTION ORAL EVERY 4 HOURS PRN
Status: DISCONTINUED | OUTPATIENT
Start: 2022-06-21 | End: 2022-06-22 | Stop reason: HOSPADM

## 2022-06-21 RX ORDER — FENTANYL CITRATE 50 UG/ML
INJECTION, SOLUTION INTRAMUSCULAR; INTRAVENOUS PRN
Status: COMPLETED | OUTPATIENT
Start: 2022-06-21 | End: 2022-06-21

## 2022-06-21 RX ORDER — TACROLIMUS 1 MG/1
3 CAPSULE ORAL DAILY
Status: DISCONTINUED | OUTPATIENT
Start: 2022-06-22 | End: 2022-06-22 | Stop reason: HOSPADM

## 2022-06-21 RX ORDER — MAGNESIUM HYDROXIDE 1200 MG/15ML
LIQUID ORAL PRN
Status: COMPLETED | OUTPATIENT
Start: 2022-06-21 | End: 2022-06-21

## 2022-06-21 RX ORDER — PREDNISONE 2.5 MG/1
2.5 TABLET ORAL EVERY EVENING
Status: DISCONTINUED | OUTPATIENT
Start: 2022-06-21 | End: 2022-06-22 | Stop reason: HOSPADM

## 2022-06-21 RX ORDER — SULFAMETHOXAZOLE AND TRIMETHOPRIM 400; 80 MG/1; MG/1
1 TABLET ORAL DAILY
Status: DISCONTINUED | OUTPATIENT
Start: 2022-06-22 | End: 2022-06-22 | Stop reason: HOSPADM

## 2022-06-21 RX ORDER — METOPROLOL TARTRATE 50 MG
50 TABLET ORAL 2 TIMES DAILY
Status: DISCONTINUED | OUTPATIENT
Start: 2022-06-21 | End: 2022-06-22 | Stop reason: HOSPADM

## 2022-06-21 RX ORDER — PANTOPRAZOLE SODIUM 40 MG/1
40 TABLET, DELAYED RELEASE ORAL
Status: DISCONTINUED | OUTPATIENT
Start: 2022-06-21 | End: 2022-06-22 | Stop reason: HOSPADM

## 2022-06-21 RX ORDER — LIDOCAINE HYDROCHLORIDE 10 MG/ML
INJECTION, SOLUTION INFILTRATION; PERINEURAL PRN
Status: COMPLETED | OUTPATIENT
Start: 2022-06-21 | End: 2022-06-21

## 2022-06-21 RX ORDER — LIDOCAINE HYDROCHLORIDE 40 MG/ML
INJECTION, SOLUTION RETROBULBAR PRN
Status: COMPLETED | OUTPATIENT
Start: 2022-06-21 | End: 2022-06-21

## 2022-06-21 RX ORDER — DULOXETIN HYDROCHLORIDE 60 MG/1
60 CAPSULE, DELAYED RELEASE ORAL DAILY
Status: DISCONTINUED | OUTPATIENT
Start: 2022-06-21 | End: 2022-06-22 | Stop reason: HOSPADM

## 2022-06-21 RX ORDER — LIDOCAINE HYDROCHLORIDE AND EPINEPHRINE 10; 10 MG/ML; UG/ML
INJECTION, SOLUTION INFILTRATION; PERINEURAL PRN
Status: COMPLETED | OUTPATIENT
Start: 2022-06-21 | End: 2022-06-21

## 2022-06-21 RX ORDER — PREDNISONE 5 MG/1
5 TABLET ORAL DAILY
Status: DISCONTINUED | OUTPATIENT
Start: 2022-06-22 | End: 2022-06-22 | Stop reason: HOSPADM

## 2022-06-21 RX ADMIN — MYCOPHENOLIC ACID 720 MG: 180 TABLET, DELAYED RELEASE ORAL at 20:45

## 2022-06-21 RX ADMIN — FENTANYL CITRATE 25 MCG: 50 INJECTION, SOLUTION INTRAMUSCULAR; INTRAVENOUS at 09:15

## 2022-06-21 RX ADMIN — LIDOCAINE HYDROCHLORIDE AND EPINEPHRINE 5 ML: 10; 10 INJECTION, SOLUTION INFILTRATION; PERINEURAL at 09:33

## 2022-06-21 RX ADMIN — GUAIFENESIN AND CODEINE PHOSPHATE 10 ML: 10; 100 LIQUID ORAL at 14:19

## 2022-06-21 RX ADMIN — METOPROLOL TARTRATE 50 MG: 50 TABLET, FILM COATED ORAL at 20:46

## 2022-06-21 RX ADMIN — SODIUM CHLORIDE 120 ML: 900 IRRIGANT IRRIGATION at 09:22

## 2022-06-21 RX ADMIN — TACROLIMUS 2 MG: 1 CAPSULE ORAL at 18:32

## 2022-06-21 RX ADMIN — LIDOCAINE HYDROCHLORIDE 9 ML: 40 INJECTION, SOLUTION RETROBULBAR; TOPICAL at 09:17

## 2022-06-21 RX ADMIN — TRANEXAMIC ACID 500 MG: 100 INJECTION, SOLUTION INTRAVENOUS at 11:22

## 2022-06-21 RX ADMIN — PANTOPRAZOLE SODIUM 40 MG: 40 TABLET, DELAYED RELEASE ORAL at 17:48

## 2022-06-21 RX ADMIN — TAMSULOSIN HYDROCHLORIDE 0.4 MG: 0.4 CAPSULE ORAL at 18:31

## 2022-06-21 RX ADMIN — LIDOCAINE HYDROCHLORIDE AND EPINEPHRINE 5 ML: 10; 10 INJECTION, SOLUTION INFILTRATION; PERINEURAL at 09:36

## 2022-06-21 RX ADMIN — LIDOCAINE HYDROCHLORIDE 15 ML: 10 INJECTION, SOLUTION INFILTRATION; PERINEURAL at 09:18

## 2022-06-21 RX ADMIN — DULOXETINE HYDROCHLORIDE 60 MG: 60 CAPSULE, DELAYED RELEASE ORAL at 18:31

## 2022-06-21 RX ADMIN — MIDAZOLAM 1 MG: 1 INJECTION INTRAMUSCULAR; INTRAVENOUS at 09:11

## 2022-06-21 RX ADMIN — PREDNISONE 2.5 MG: 2.5 TABLET ORAL at 20:46

## 2022-06-21 RX ADMIN — LIDOCAINE HYDROCHLORIDE AND EPINEPHRINE 5 ML: 10; 10 INJECTION, SOLUTION INFILTRATION; PERINEURAL at 09:26

## 2022-06-21 RX ADMIN — LIDOCAINE HYDROCHLORIDE AND EPINEPHRINE 5 ML: 10; 10 INJECTION, SOLUTION INFILTRATION; PERINEURAL at 09:29

## 2022-06-21 RX ADMIN — IPRATROPIUM BROMIDE AND ALBUTEROL SULFATE 3 ML: 2.5; .5 SOLUTION RESPIRATORY (INHALATION) at 10:43

## 2022-06-21 RX ADMIN — AMLODIPINE BESYLATE 10 MG: 10 TABLET ORAL at 20:46

## 2022-06-21 RX ADMIN — FENTANYL CITRATE 50 MCG: 50 INJECTION, SOLUTION INTRAMUSCULAR; INTRAVENOUS at 09:11

## 2022-06-21 NOTE — H&P
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     # Bronchial hemorrhage 2/2 irritation with bronchoscopy while on blood thinners  Routine scheduled bronchoscopy with biopsy leading to bleeding initially controlled with TXA nebs. Initially requiring 2 L oxygen by facemask and tolerating nasal cannula, though subjectively with discomfort secondary to lack of being able to take deep breaths. CXR with new patchy right basilar airspace opacities, consistent with hemorrhage.   - Discussed with pulmonary, with recs as follows  -  mg nebs x3 q8h   - Robitussin AC every 4 hrs prn to suppress cough and disruption of any clot  - Monitoring respiratory status closely overnight, repeat CXR in morning with updated pulmonary recs  - Holding warfarin and aspirin    # ILD, NISP, s/p bilateral lung transplant (10/16/2021)  # Acute on chronic respiratory failure s/p tracheostomy (10/29/21) and decannulation (12/8/21)  -Immunosuppression:              -Continue tacrolimus goal 8-12 (last level 10.1 6/20)              -Continue  mg BID              -Continue prednisone - Taper plan per pulmonary.  Currently on 5 mg/2.5 mg  - Routine labs/imaging              - No evidence of rejection on biopsy   - Respiratory viral panel, CMV negative   - Cytology, other routine workup pending  - No incentive spirometry as to avoid disrupting clot  - Bactrim prophylaxis    # hx of GI Bleed: severe, during  "prolonged hospital stay c/b bradycardic arrest. Stable currently  -Continue PPI BID  -Warfarin on hold    # Hx CVA  MRI brain 10/23 with infarcts noted in both cerebral hemispheres (R frontal, L corona radiata, bilateral occipital), left cerebellum, presumed associated with new Afib vs perioperative  - Continue secondary prevention with rosuvastatin  - Holding aspirin/warfarin with bronchial hemorrhage     # Hx anxiety/depression: PTA cymbalta  # Afib: continue PTA metoprolol, holding warfarin as above  #Hypertension: PTA amlodipine  #R subclavian DVT: warfarin on hold as above  #Hypogammaglobulinemia: last IgG 502 1/3/22  #Type 2 diabetes: hypoglycemia protocol while here, very low insulin requirements at home, LDSSI while here  #Hypothyroidism: synthroid held with observation status  #CKD: Cr at/below baseline (1.5-1.7) at 1.34  #Rheumatoid arthritis: stable       Diet: Regular Diet Adult  NPO per Anesthesia Guidelines for Procedure/Surgery Except for: Meds    DVT Prophylaxis: warfarin on hold  Watson Catheter: Not present  Fluids: none  Central Lines: None  Cardiac Monitoring: None  Code Status: Full Code      Clinically Significant Risk Factors Present on Admission               # Coagulation Defect: home medication list includes an anticoagulant medication    # Overweight: Estimated body mass index is 26.41 kg/m  as calculated from the following:    Height as of this encounter: 1.626 m (5' 4\").    Weight as of this encounter: 69.8 kg (153 lb 14.1 oz).      Disposition Plan   Expected Discharge:    Anticipated discharge location:  Awaiting care coordination huddle  Delays:           Staffed with Dr. 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      Underwent routine bronchoscopy today with biopsy complicated by bleeding and increased oxygen requirements with subjective increased shortness of breath, tightness unable to take deep breaths. No preceding cough, bleeding or clotting issues, new LE edema, fevers/chills, chest pain. No abdominal pain, diarrhea, urinary symptoms.    On 2 LPM oxymask, blood pressure 107/73, heart rate 63.    Review of Systems    10 point ROS negative other than noted in HPI    Past Medical History    Reviewed    Past Surgical History   Reviewed    Social History   Lives in [place] with ance, no alcohol, tobacco, other drug use    Family History   I have reviewed this patient's family history and updated it with pertinent information if needed.  Family History   Problem Relation Age of Onset     Diabetes Type 1 Mother      Heart Disease Mother      Chronic Obstructive Pulmonary Disease Mother      Rheumatoid Arthritis Father      Emphysema Paternal Grandfather        Prior to Admission Medications   Prior to Admission Medications   Prescriptions Last Dose Informant Patient Reported? Taking?   DULoxetine (CYMBALTA) 30 MG capsule 6/20/2022 at Unknown time  Yes Yes   Sig: Take 2 capsules (60 mg) by mouth daily One capsule (30mg) daily for 1-7 days, then 2 capsules (60mg) daily   MYFORTIC (BRAND) 360 MG EC tablet 6/21/2022 at Unknown time  No Yes   Sig: Take 2  tablets (720 mg) by mouth 2 times daily   Magnesium Glycinate POWD 2022 at Unknown time  No Yes   Si mg 2 times daily Take 3 tablets (100mg each) two times daily   Melatonin 10 MG TABS tablet 2022 at Unknown time  No Yes   Sig: Take 1 tablet (10 mg) by mouth nightly as needed for sleep   acetaminophen (TYLENOL) 325 MG tablet Past Month at Unknown time  No Yes   Sig: 3 tablets (975 mg) by Oral or Feeding Tube route every 8 hours as needed for mild pain   amLODIPine (NORVASC) 10 MG tablet 2022 at Unknown time  No Yes   Sig: Take 1 tablet (10 mg) by mouth At Bedtime   calcium carbonate 600 mg-vitamin D 400 units (CALTRATE) 600-400 MG-UNIT per tablet 2022 at Unknown time  No Yes   Si tablet by Oral or Feeding Tube route 2 times daily (with meals)   diclofenac (VOLTAREN) 1 % topical gel 2022 at Unknown time  No Yes   Sig: Apply 4 g topically 4 times daily Both hands   enoxaparin ANTICOAGULANT (LOVENOX) 80 MG/0.8ML syringe 2022 at 0800  No Yes   Sig: Directions: 1. Hold syringe with needle towards the ground. 2. Very slowly squirt out the extra medication using the plunger until the bottom of the air bubble is at 0.7ML. 3. Leave the air bubble in the syringe. 4. Inject dose as directed. Inject subcutaneously into abdomen every 12 hours or as directed by the Coumadin clinic.   enoxaparin ANTICOAGULANT (LOVENOX) 80 MG/0.8ML syringe   No No   Sig: Directions: 1. Hold syringe with needle towards the ground. 2. Very slowly squirt out the extra medication using the plunger until the bottom of the air bubble is at 0.7ML. 3. Leave the air bubble in the syringe. 4. Inject dose as directed. Inject subcutaneously into abdomen every 12 hours or as directed by the Coumadin clinic.   fluticasone (FLONASE) 50 MCG/ACT nasal spray 2022 at Unknown time  No Yes   Sig: Spray 1 spray into both nostrils daily   insulin glargine (LANTUS PEN) 100 UNIT/ML pen 2022 at Unknown time  No Yes   Sig:  Inject 3 Units Subcutaneous every morning   levalbuterol (XOPENEX HFA) 45 MCG/ACT inhaler   No No   Sig: Inhale 2 puffs into the lungs every 4 hours as needed for wheezing or shortness of breath / dyspnea   Patient not taking: Reported on 2022   levothyroxine (SYNTHROID/LEVOTHROID) 25 MCG tablet 2022 at Unknown time  No Yes   Sig: Take 1 tablet (25 mcg) by mouth daily   metoprolol tartrate (LOPRESSOR) 25 MG tablet 2022 at Unknown time  No Yes   Sig: Take 2 tablets (50 mg) by mouth 2 times daily   multivitamin w/minerals (THERA-VIT-M) tablet 2022 at Unknown time  No Yes   Sig: Take 1 tablet by mouth daily   nystatin (MYCOSTATIN) 344647 UNIT/ML suspension 2022 at Unknown time  No Yes   Sig: Swish and swallow 10 mLs (1,000,000 Units) in mouth 4 times daily   ondansetron (ZOFRAN-ODT) 4 MG ODT tab More than a month at Unknown time  No Yes   Sig: Take 1 tablet (4 mg) by mouth every 6 hours as needed for nausea   pantoprazole (PROTONIX) 40 MG EC tablet 2022 at Unknown time  No Yes   Sig: Take 1 tablet (40 mg) by mouth 2 times daily (before meals)   phytonadione (MEPHYTON) 5 MG tablet   No No   Sig: Take 1 tablet (5 mg) by mouth once for 1 dose   predniSONE (DELTASONE) 5 MG tablet 2022 at Unknown time  Yes Yes   Si tablet (5 mg) every morning AND 0.5 tablets (2.5 mg) every evening. Take 5 mg in am and 5 mg in afternoon.   rosuvastatin (CRESTOR) 10 MG tablet 2022 at Unknown time  No Yes   Sig: Take 1 tablet (10 mg) by mouth daily   senna-docusate (SENOKOT-S/PERICOLACE) 8.6-50 MG tablet More than a month at Unknown time  No Yes   Sig: Take 2 tablets by mouth 2 times daily   sulfamethoxazole-trimethoprim (BACTRIM) 400-80 MG tablet   No No   Sig: Take 1 tablet by mouth daily   tacrolimus (GENERIC EQUIVALENT) 1 MG capsule 2022 at Unknown time  Yes Yes   Sig: Take 3 capsules (3 mg) by mouth every morning AND 2 capsules (2 mg) every evening.   tamsulosin (FLOMAX) 0.4 MG capsule  2022 at Unknown time  Yes Yes   Sig: Take 1 capsule (0.4 mg) by mouth daily   warfarin ANTICOAGULANT (COUMADIN) 1 MG tablet   No No   Sig: Take 2 tablets (2 mg) by mouth every evening Further dosing per anticoagulation clinic   Patient taking differently: Take 0.5-1 mg by mouth every evening Further dosing per anticoagulation clinic   warfarin ANTICOAGULANT (COUMADIN) 1 MG tablet 2022  No No   Sig: Take one to two tablets by mouth daily OR as directed by Anticoagulation Clinic   warfarin ANTICOAGULANT (COUMADIN) 2 MG tablet   No No   Sig: Take 2-3 tablets daily or as directed   zolpidem (AMBIEN) 10 MG tablet 2022  Yes No   Si/2 to 1 tablet as needed      Facility-Administered Medications: None     Allergies   No Known Allergies    Physical Exam   Vital Signs: Temp: 98.3  F (36.8  C) Temp src: Oral BP: 122/74 Pulse: 59   Resp: 16 SpO2: 93 % O2 Device: None (Room air) Oxygen Delivery: 1 LPM  Weight: 153 lbs 14.1 oz    General Appearance: uncomfortable appearing, alert  Eyes: no conjunctival injection  HEENT: mucus membranes moist  Respiratory: CTAB, breathing shallow, reduced lung sounds R base  Cardiovascular: RRR, no LE edema  GI: soft  Skin: no superficial hematoma  Neurologic: AOx3, moving all extremities    Data   Data reviewed today: I reviewed all medications, new labs and imaging results over the last 24 hours.

## 2022-06-21 NOTE — RESULT ENCOUNTER NOTE
Tacrolimus level 10.1 at 12 hours, on 6/20/22.  Goal 8-12.   Current dose 3 mg in AM, 2 mg in PM    No change in dose  3i Systemst message sent      Yes

## 2022-06-21 NOTE — PROGRESS NOTES
This is a recent snapshot of the patient's [facility] medical record.  For current drug dose and complete information and questions, call [phone] or In Basket pool, [facility] home infusion ([id number])  CSN Number:  [id number]

## 2022-06-21 NOTE — DISCHARGE INSTRUCTIONS
[facility], [place]  Same-Day BRONCHOSCOPY Procedure (with or without biopsy and/or intervention)  Adult Discharge Orders & Instructions     You had a procedure known as an Bronchoscopy (Bronch). Your healthcare provider performed the Bronch to look directly into the airways of your trachea and/or lungs. This is done using a lighted camera called a bronchoscope. The bronchoscope allows your provider to see inside the tissue of the airways. Often biopsies, small samples of tissue, are taken to help diagnose and/or classify stages of disease growth. This procedure is used to diagnose diseases of the lungs and/or surrounding tissues.     After your procedure   Make sure to clarify with your healthcare provider any diet restrictions (For example, clear liquid, low fat, no caffeine, etc.)   Do NOT take aspirin containing medications or any other blood-thinning medicines (anticoagulants) until your healthcare provider says it's OK.   You MAY be prescribed antibiotics, depending on what was done and/or found during your EUS, make sure to take antibiotics as prescribed by your healthcare provider    For 24 hours after BRONCHOSCOPY     You may cough up a small amount of blood for a day or two  Get plenty of rest.  A responsible adult must stay with you for at least 24 hours after you leave the hospital.   Do not drive or use heavy equipment.  If you have weakness or tingling, don't drive or use heavy equipment until this feeling goes away.  Do not drink alcohol.  Avoid strenuous or risky activities (gym, yoga, cycling, etc.).  Ask for help when climbing stairs.   You may feel lightheaded.  IF so, sit for a few minutes before standing.  Have someone help you get up.   If you have nausea (feel sick to your stomach): Drink only clear liquids such as apple juice, ginger ale, broth or 7-Up.  Rest may also help.  Be sure to drink enough fluids.  Move to a regular diet as you feel able.  You may have a  slight fever. Call the doctor if your fever is over 100 F (37.7 C) (taken under the tongue) or lasts longer than 24 hours.  You may have a dry mouth, a sore throat, muscle aches or trouble sleeping.  These are normal and should go away after 24 hours.  Do not make important or legal decisions.     Call your doctor  for any of the following:    If you begin to cough up bright red blood, or large clots of blood   If you become increasing more short of breath or begin to have chest pains  Difficulty swallowing or feeling as though food or liquids are getting stuck   Sore throat lasting more than 2 days or pain that has worsened over time  Nausea and/or vomiting that is not resolving or has not responded to anti-nausea medications if prescribed to you   If you experience a fever above 100.5 F (38 C) for more than 24 hours.   It has been over 8 to 10 hours since surgery and you are still not able to urinate (pass water).      To contact a doctor, call:  [ ] Pulmonary  Clinic at [phone]._(Monday thru Friday 8:00am to 4:30pm)  [ ] [phone] and ask for the PULMONARY MEDICINE resident on call (answered 24 hours a day)  [ ] Emergency Department: [facility]: [phone]    Take it easy when you get home.  Remember, same day surgery DOES NOT MEAN SAME DAY RECOVERY!  Healing is a gradual process.  You will need some time to recover - you may be more tired than you realize at first.  Rest and relax for at least the first 24 hours at home.  You'll feel better and heal faster if you take good care of yourself.   Bronchoscopy:

## 2022-06-21 NOTE — OR NURSING
Bronchoscopy with lavage and biopsies. Anesthesia called at 0940 for increase in bleeding and for airway protection, pt did not get intubated and recovered after thrombin and epi lido administration. X ray in room negative for pneumo. Pt transferred on 4 L oxi plus to recovery. Max O2 15 L O2.

## 2022-06-22 ENCOUNTER — TELEPHONE (OUTPATIENT)
Dept: ANTICOAGULATION | Facility: CLINIC | Age: 57
End: 2022-06-22

## 2022-06-22 ENCOUNTER — APPOINTMENT (OUTPATIENT)
Dept: GENERAL RADIOLOGY | Facility: CLINIC | Age: 57
End: 2022-06-22
Attending: STUDENT IN AN ORGANIZED HEALTH CARE EDUCATION/TRAINING PROGRAM
Payer: COMMERCIAL

## 2022-06-22 VITALS
RESPIRATION RATE: 16 BRPM | BODY MASS INDEX: 26.27 KG/M2 | HEIGHT: 64 IN | TEMPERATURE: 98.1 F | SYSTOLIC BLOOD PRESSURE: 114 MMHG | DIASTOLIC BLOOD PRESSURE: 79 MMHG | WEIGHT: 153.9 LBS | OXYGEN SATURATION: 93 % | HEART RATE: 67 BPM

## 2022-06-22 DIAGNOSIS — I48.91 ATRIAL FIBRILLATION, UNSPECIFIED TYPE (H): Primary | ICD-10-CM

## 2022-06-22 LAB
DONOR IDENTIFICATION: NORMAL
DQB5: 1100
DSA COMMENTS: NORMAL
DSA PRESENT: YES
DSA TEST METHOD: NORMAL
ERYTHROCYTE [DISTWIDTH] IN BLOOD BY AUTOMATED COUNT: 14.7 % (ref 10–15)
GLUCOSE BLDC GLUCOMTR-MCNC: 103 MG/DL (ref 70–99)
GLUCOSE BLDC GLUCOMTR-MCNC: 97 MG/DL (ref 70–99)
HCT VFR BLD AUTO: 31.4 % (ref 40–53)
HGB BLD-MCNC: 9.9 G/DL (ref 13.3–17.7)
HOLD SPECIMEN: NORMAL
MCH RBC QN AUTO: 26.5 PG (ref 26.5–33)
MCHC RBC AUTO-ENTMCNC: 31.5 G/DL (ref 31.5–36.5)
MCV RBC AUTO: 84 FL (ref 78–100)
ORGAN: NORMAL
PATH REPORT.COMMENTS IMP SPEC: NORMAL
PATH REPORT.FINAL DX SPEC: NORMAL
PATH REPORT.GROSS SPEC: NORMAL
PATH REPORT.RELEVANT HX SPEC: NORMAL
PLATELET # BLD AUTO: 190 10E3/UL (ref 150–450)
RBC # BLD AUTO: 3.73 10E6/UL (ref 4.4–5.9)
SA 1 CELL: NORMAL
SA 1 TEST METHOD: NORMAL
SA 2 CELL: NORMAL
SA 2 TEST METHOD: NORMAL
SA1 HI RISK ABY: NORMAL
SA1 MOD RISK ABY: NORMAL
SA2 HI RISK ABY: NORMAL
SA2 MOD RISK ABY: NORMAL
UNACCEPTABLE ANTIGENS: NORMAL
UNOS CPRA: 26
WBC # BLD AUTO: 6.2 10E3/UL (ref 4–11)
ZZZSA 1  COMMENTS: NORMAL
ZZZSA 2 COMMENTS: NORMAL

## 2022-06-22 PROCEDURE — 250N000013 HC RX MED GY IP 250 OP 250 PS 637: Performed by: STUDENT IN AN ORGANIZED HEALTH CARE EDUCATION/TRAINING PROGRAM

## 2022-06-22 PROCEDURE — 94640 AIRWAY INHALATION TREATMENT: CPT

## 2022-06-22 PROCEDURE — 99217 PR OBSERVATION CARE DISCHARGE: CPT | Mod: GC | Performed by: STUDENT IN AN ORGANIZED HEALTH CARE EDUCATION/TRAINING PROGRAM

## 2022-06-22 PROCEDURE — 99222 1ST HOSP IP/OBS MODERATE 55: CPT | Mod: GC | Performed by: INTERNAL MEDICINE

## 2022-06-22 PROCEDURE — 71045 X-RAY EXAM CHEST 1 VIEW: CPT

## 2022-06-22 PROCEDURE — 71045 X-RAY EXAM CHEST 1 VIEW: CPT | Mod: 26 | Performed by: RADIOLOGY

## 2022-06-22 PROCEDURE — G0378 HOSPITAL OBSERVATION PER HR: HCPCS

## 2022-06-22 PROCEDURE — 999N000157 HC STATISTIC RCP TIME EA 10 MIN

## 2022-06-22 PROCEDURE — 82962 GLUCOSE BLOOD TEST: CPT

## 2022-06-22 PROCEDURE — 250N000012 HC RX MED GY IP 250 OP 636 PS 637: Performed by: STUDENT IN AN ORGANIZED HEALTH CARE EDUCATION/TRAINING PROGRAM

## 2022-06-22 PROCEDURE — 36415 COLL VENOUS BLD VENIPUNCTURE: CPT | Performed by: STUDENT IN AN ORGANIZED HEALTH CARE EDUCATION/TRAINING PROGRAM

## 2022-06-22 PROCEDURE — 85014 HEMATOCRIT: CPT | Performed by: STUDENT IN AN ORGANIZED HEALTH CARE EDUCATION/TRAINING PROGRAM

## 2022-06-22 PROCEDURE — 250N000009 HC RX 250: Performed by: STUDENT IN AN ORGANIZED HEALTH CARE EDUCATION/TRAINING PROGRAM

## 2022-06-22 RX ORDER — CODEINE PHOSPHATE AND GUAIFENESIN 10; 100 MG/5ML; MG/5ML
10 SOLUTION ORAL EVERY 4 HOURS PRN
Qty: 118 ML | Refills: 0 | Status: SHIPPED | OUTPATIENT
Start: 2022-06-22 | End: 2022-06-22

## 2022-06-22 RX ORDER — CODEINE PHOSPHATE AND GUAIFENESIN 10; 100 MG/5ML; MG/5ML
10 SOLUTION ORAL EVERY 4 HOURS PRN
Qty: 118 ML | Refills: 0 | Status: SHIPPED | OUTPATIENT
Start: 2022-06-22 | End: 2022-09-12

## 2022-06-22 RX ADMIN — TACROLIMUS 3 MG: 1 CAPSULE ORAL at 09:09

## 2022-06-22 RX ADMIN — MYCOPHENOLIC ACID 720 MG: 180 TABLET, DELAYED RELEASE ORAL at 09:09

## 2022-06-22 RX ADMIN — PREDNISONE 5 MG: 5 TABLET ORAL at 09:09

## 2022-06-22 RX ADMIN — TAMSULOSIN HYDROCHLORIDE 0.4 MG: 0.4 CAPSULE ORAL at 09:09

## 2022-06-22 RX ADMIN — SULFAMETHOXAZOLE AND TRIMETHOPRIM 1 TABLET: 400; 80 TABLET ORAL at 09:09

## 2022-06-22 RX ADMIN — METOPROLOL TARTRATE 50 MG: 50 TABLET, FILM COATED ORAL at 09:09

## 2022-06-22 RX ADMIN — DULOXETINE HYDROCHLORIDE 60 MG: 60 CAPSULE, DELAYED RELEASE ORAL at 09:08

## 2022-06-22 RX ADMIN — PANTOPRAZOLE SODIUM 40 MG: 40 TABLET, DELAYED RELEASE ORAL at 09:09

## 2022-06-22 RX ADMIN — TRANEXAMIC ACID 500 MG: 100 INJECTION, SOLUTION INTRAVENOUS at 13:08

## 2022-06-22 NOTE — DISCHARGE SUMMARY
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 Follow up with pulmonology as previously scheduled    Appointments on [place] and/or [facility] (with [facility] or [facility]   provider or service). Call [phone] if you haven't heard regarding   these appointments within 7 days of discharge.             Unresulted Labs Ordered in the Past 30 Days of this Admission     Date and Time Order Name Status Description    6/21/2022 10:02 AM Acid-Fast Bacilli Culture and Stain In process     6/21/2022 10:02 AM Respiratory Aerobic Bacterial Culture Preliminary     6/21/2022 10:02 AM Cytology, non-gynecologic In process     6/21/2022 10:02 AM Fungal or Yeast Culture Routine Preliminary     6/21/2022 10:02 AM Nocardia species culture Preliminary     6/21/2022 10:02 AM Acid-Fast Bacilli Culture and Stain In process       These results will be followed up by transplant pulmonology    Discharge Disposition   Discharged to home  Condition at discharge: Stable    Hospital Course   Edson Thornton is a 57 yo M with PMHx of NSIP/ILD 2/2 Rheumatoid lung disease s/p BSLT 10/16/21 c/b prolonged hospital course with tracheostomy s/p decannulation, rheumatoid  arthritis, bronchiectasis, pulmonary HTN, SALTY, essential HTN, HLD, PLD, hypogammaglobinemia, duodenal anomaly, anxiety, and depression admitted for observation after bronchial hemorrhage with routine scheduled bronchoscopy.      # Bronchial hemorrhage 2/2 irritation with bronchoscopy while on blood thinners  Routine scheduled bronchoscopy with biopsy leading to bleeding initially controlled with TXA nebs. Initially requiring 2 L oxygen by facemask and tolerating nasal cannula, though subjectively with discomfort secondary to lack of being able to take deep breaths. CXR with new patchy right basilar airspace opacities, consistent with hemorrhage. The following morning, after 3 TXA nebs and cough suppressant to reduce disturbance of any clot, weaned off nasal cannula oxygen, was ambulating well on his own, and CXR showed improvement. Pulmonology saw and was deemed stable for discharge, with lovenox/warfarin plan as below.  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  Currently on 5 mg/2.5 mg  - Routine labs/imaging              - No evidence of rejection on biopsy              - Respiratory viral panel, CMV negative              - Cytology, other routine workup pending  - Bactrim prophylaxis     # Hx of GI Bleed: severe, during prolonged hospital stay c/b bradycardic arrest. Stable currently.  -Continue PPI BID  -Warfarin on hold until 6/27 as above     # Hx CVA  MRI brain 10/23  with infarcts noted in both cerebral hemispheres (R frontal, L corona radiata, bilateral occipital), left cerebellum, presumed associated with new Afib vs perioperative  - Continue secondary prevention with rosuvastatin  - Holding aspirin/warfarin with bronchial hemorrhage until 6/27 as above     # Hx anxiety/depression: PTA cymbalta  # Afib: continue PTA metoprolol, holding warfarin as above  #Hypertension: PTA amlodipine  #R subclavian DVT: warfarin on hold as above  #Hypogammaglobulinemia: last IgG 502 1/3/22  #Type 2 diabetes: PTA insulin  #Hypothyroidism: synthroid held with observation status, resume at home  #CKD: Cr at/below baseline (1.5-1.7) at 1.34  #Rheumatoid arthritis: stable    Consultations This Hospital Stay   PULMONARY GENERAL ADULT IP CONSULT    Code Status   Full Code       The patient was discussed with Dr. Nahum Claudio MD  [facility] [address] [address]  Phone: [phone]  ______________________________________________________________________    Physical Exam   Vital Signs: Temp: 98.1  F (36.7  C) Temp src: Oral BP: 120/80 Pulse: 68   Resp: 15 SpO2: 98 % O2 Device: None (Room air) Oxygen Delivery: 1 LPM  Weight: 153 lbs 14.4 oz  General Appearance: Well appearing, sitting up in bed  Respiratory: no respiratory distress, reduced sounds over R base otherwise CTAB  Cardiovascular: RRR, no LE edema  GI: soft  Skin: no rashes      Primary Care Physician   Lia Spicer    Discharge Orders      Reason for your hospital stay    Dear Edson Thornton,    Your were hospitalized at [facility] with bleeding within your lungs after routine bronchoscopy. You were treated with nebulizers to help control the bleeding, and a cough suppressant to help not to disturb any clotted area within your lungs.  Over your hospitalization your condition improved and today you are ready to be discharged home.  You  should continue to improve but if you develop new worsening bloody cough, shortness of breath, please immediately seek medical attention at the Emergency Department.    We are suggesting the following medication changes:  - Use robitussin AC to help suppress any cough  - Hold lovenox until Monday, then resume bridging with lovenox and warfarin as initially planned (hold all in the meantime)  - No other medication changes    Please get the following tests done:  - none    Please set up an appointment with:  - Pulmonology as previously scheduled    It was a pleasure meeting with you today. Thank you for allowing me and my team the privilege of caring for you during your hospitalization. You are the reason we are here, and I truly hope we provided you with the excellent service you deserve. Please let us know if there is anything else we can do for you so that we can be sure you are leaving completely satisfied with your care experience.    If you have any questions post-discharge, please call [address] at [phone] and ask that I be contacted. I will return your call when able.     Sincerely,    Jose Claudio MD  Internal Medicine Resident  Pager: [phone]     Activity    Your activity upon discharge: activity as tolerated     Adult [facility] Follow-up and recommended labs and tests    1. Follow up with pulmonology as previously scheduled    Appointments on [place] and/or [facility] (with [facility] or [facility] provider or service). Call [phone] if you haven't heard regarding these appointments within 7 days of discharge.     Diet    Follow this diet upon discharge: Orders Placed This Encounter    Regular diet       Significant Results and Procedures   Results for orders placed or performed during the hospital encounter of 06/21/22   XR Chest Port 1 View    Narrative    Exam: XR CHEST PORT 1 VIEW, 6/21/2022 10:15 AM    Comparison: Chest x-ray 6/20/2022, 4/19/2022    History: POST BLEEDING FROM  BIOPSIES    Findings:  Portable AP view of the chest. Postsurgical changes of bilateral lung  transplant with intact clam shell sternotomy wires and mediastinal  clips. Trachea is midline. Cardiomediastinal silhouette is partially  obscured New patchy right basilar opacities. Increased hazy  opacification along the lingula and left lower lobe. No appreciable  pneumothorax. Endoscopy clips project over the epigastric region.  Chronic right clavicle deformity.       Impression    Impression:   1. New patchy right basilar airspace opacities, consistent with  hemorrhage.   2. Increased hazy opacification along the lingula and left lower lobe.    3. No appreciable pneumothorax.     I have personally reviewed the examination and initial interpretation  and I agree with the findings.    DAVIN ANGUIANO MD         SYSTEM ID:  [id number]   XR Chest Port 1 View    Narrative    Exam: XR CHEST PORT 1 VIEW, 6/22/2022 8:41 AM    Indication: follow up bronchial hemorrhage after bronchoscopy 6/21    Comparison: 6/21/2022    Findings:   Intact sternotomy. Improving but not resolved consolidative opacity in  the right middle lobe. Linear opacities at the left lung base likely  subsegmental atelectasis. Opacity over the left midlung is fluid in  the fissure better seen on lateral chest x-ray of 6/20/2022. No new  airspace disease identified. Heart and pulmonary vasculature within  normal limits.      Impression    Impression: Improving but not resolved right lobe airspace opacity.    MAURICIO NAVAS MD         SYSTEM ID:  [id number]       Discharge Medications   Current Discharge Medication List      START taking these medications    Details   guaiFENesin-codeine (ROBITUSSIN AC) 100-10 MG/5ML solution Take 10 mLs by mouth every 4 hours as needed for cough  Qty: 118 mL, Refills: 0    Associated Diagnoses: Bronchial hemorrhage         CONTINUE these medications which have NOT CHANGED    Details   acetaminophen (TYLENOL) 325 MG tablet 3  tablets (975 mg) by Oral or Feeding Tube route every 8 hours as needed for mild pain  Qty: 100 tablet, Refills: 11    Associated Diagnoses: S/P lung transplant (H)      amLODIPine (NORVASC) 10 MG tablet Take 1 tablet (10 mg) by mouth At Bedtime  Qty: 30 tablet, Refills: 11    Associated Diagnoses: Hypertension      calcium carbonate 600 mg-vitamin D 400 units (CALTRATE) 600-400 MG-UNIT per tablet 1 tablet by Oral or Feeding Tube route 2 times daily (with meals)  Qty: 60 tablet, Refills: 11    Associated Diagnoses: S/P lung transplant (H)      diclofenac (VOLTAREN) 1 % topical gel Apply 4 g topically 4 times daily Both hands  Qty: 150 g, Refills: 11    Associated Diagnoses: Rheumatoid arthritis involving both hands, unspecified whether rheumatoid factor present (H)      DULoxetine (CYMBALTA) 30 MG capsule Take 2 capsules (60 mg) by mouth daily One capsule (30mg) daily for 1-7 days, then 2 capsules (60mg) daily    Associated Diagnoses: S/P lung transplant (H); Immunosuppressed status (H)      fluticasone (FLONASE) 50 MCG/ACT nasal spray Spray 1 spray into both nostrils daily  Qty: 16 g, Refills: 11    Associated Diagnoses: S/P lung transplant (H)      insulin glargine (LANTUS PEN) 100 UNIT/ML pen Inject 3 Units Subcutaneous every morning  Qty: 15 mL, Refills: 0    Comments: If Lantus is not covered by insurance, may substitute Basaglar or Semglee or other insulin glargine product per insurance preference at same dose and frequency.    Associated Diagnoses: Type 2 diabetes mellitus with hyperglycemia, with long-term current use of insulin (H)      levothyroxine (SYNTHROID/LEVOTHROID) 25 MCG tablet Take 1 tablet (25 mcg) by mouth daily  Qty: 30 tablet, Refills: 11    Associated Diagnoses: S/P lung transplant (H)      Magnesium Glycinate POWD 300 mg 2 times daily Take 3 tablets (100mg each) two times daily  Qty: 18 g, Refills: 11    Associated Diagnoses: S/P lung transplant (H); Immunosuppressed status (H);  Hypomagnesemia      Melatonin 10 MG TABS tablet Take 1 tablet (10 mg) by mouth nightly as needed for sleep  Qty: 30 tablet, Refills: 3    Associated Diagnoses: Insomnia due to medical condition      metoprolol tartrate (LOPRESSOR) 25 MG tablet Take 2 tablets (50 mg) by mouth 2 times daily  Qty: 120 tablet, Refills: 11    Associated Diagnoses: Benign essential hypertension; Tachycardia      multivitamin w/minerals (THERA-VIT-M) tablet Take 1 tablet by mouth daily  Qty: 30 tablet, Refills: 11    Associated Diagnoses: S/P lung transplant (H)      MYFORTIC (BRAND) 360 MG EC tablet Take 2 tablets (720 mg) by mouth 2 times daily  Qty: 120 tablet, Refills: 11    Comments: TXP DT 10/16/2021 (Lung) TXP Dischg DT 12/13/2021 DX Lung replaced by transplant Z94.2 TX Center [facility] ([place], [place])  Associated Diagnoses: S/P lung transplant (H)      nystatin (MYCOSTATIN) 561095 UNIT/ML suspension Swish and swallow 10 mLs (1,000,000 Units) in mouth 4 times daily  Qty: 1500 mL, Refills: 4    Associated Diagnoses: S/P lung transplant (H)      ondansetron (ZOFRAN-ODT) 4 MG ODT tab Take 1 tablet (4 mg) by mouth every 6 hours as needed for nausea  Qty: 30 tablet, Refills: 3    Associated Diagnoses: Lung replaced by transplant (H); Immunosuppressed status (H); Encounter for long-term (current) use of high-risk medication; Nausea      pantoprazole (PROTONIX) 40 MG EC tablet Take 1 tablet (40 mg) by mouth 2 times daily (before meals)  Qty: 60 tablet, Refills: 11    Associated Diagnoses: S/P lung transplant (H)      predniSONE (DELTASONE) 5 MG tablet 1 tablet (5 mg) every morning AND 0.5 tablets (2.5 mg) every evening. Take 5 mg in am and 5 mg in afternoon.  Qty: 150 tablet, Refills: 3    Associated Diagnoses: S/P lung transplant (H)      rosuvastatin (CRESTOR) 10 MG tablet Take 1 tablet (10 mg) by mouth daily  Qty: 30 tablet, Refills: 11    Associated Diagnoses: S/P lung transplant (H)       senna-docusate (SENOKOT-S/PERICOLACE) 8.6-50 MG tablet Take 2 tablets by mouth 2 times daily  Qty: 120 tablet, Refills: 11    Associated Diagnoses: Slow transit constipation      tacrolimus (GENERIC EQUIVALENT) 1 MG capsule Take 3 capsules (3 mg) by mouth every morning AND 2 capsules (2 mg) every evening.  Qty: 150 capsule, Refills: 11    Comments: Dose change on 5/9/22      tamsulosin (FLOMAX) 0.4 MG capsule Take 1 capsule (0.4 mg) by mouth daily    Associated Diagnoses: S/P lung transplant (H); Immunosuppressed status (H); Urinary retention      levalbuterol (XOPENEX HFA) 45 MCG/ACT inhaler Inhale 2 puffs into the lungs every 4 hours as needed for wheezing or shortness of breath / dyspnea  Qty: 15 g, Refills: 3    Associated Diagnoses: S/P lung transplant (H)      sulfamethoxazole-trimethoprim (BACTRIM) 400-80 MG tablet Take 1 tablet by mouth daily  Qty: 30 tablet, Refills: 11    Associated Diagnoses: S/P lung transplant (H)      zolpidem (AMBIEN) 10 MG tablet 1/2 to 1 tablet as needed    Associated Diagnoses: S/P lung transplant (H); Immunosuppressed status (H); Insomnia due to medical condition         STOP taking these medications       enoxaparin ANTICOAGULANT (LOVENOX) 80 MG/0.8ML syringe Comments:   Reason for Stopping:         enoxaparin ANTICOAGULANT (LOVENOX) 80 MG/0.8ML syringe Comments:   Reason for Stopping:         phytonadione (MEPHYTON) 5 MG tablet Comments:   Reason for Stopping:         warfarin ANTICOAGULANT (COUMADIN) 1 MG tablet Comments:   Reason for Stopping:         warfarin ANTICOAGULANT (COUMADIN) 1 MG tablet Comments:   Reason for Stopping:         warfarin ANTICOAGULANT (COUMADIN) 2 MG tablet Comments:   Reason for Stopping:             Allergies   No Known Allergies

## 2022-06-22 NOTE — PROGRESS NOTES
D/obs transfer from  for overnight watch after bronch, lavage and biopsy. Had some bleeding RLL and epi, lido given into lung and then thrombin given. History of previous trach, old CVA with infarcts, Afib, Anxiety/depression, SALTY, Rheumatoid arthritis, bilateral lung TP 10/6/22. CXR no pneumo, Had one episode of bloody sputum right after procedure and none since.  He is A&O, 1 L oxygen for comfort  A/He says no more bloody sputum and even though he had his am pills a while ago, he wants to take his evening pills on schedule. He appears pink, calm and in no distress  P/obs monitoring overnight, probably home in am  --OBS Goals--  D/-tests for tonight are completed  -no consults for tonight  -vitals are good for now  -not SOB at rest, and sats are good for now  -baseline function  -possibly home in am

## 2022-06-22 NOTE — PROGRESS NOTES
Observation goals:    -diagnostic tests and consults completed and resulted: yes  -vital signs normal or at patient baseline: yes   -dyspnea improved and O2 sats greater than 88% on room air or prior home oxygen levels: yes  -returns to baseline functional status: yes   -safe disposition plan has been identified: yes

## 2022-06-22 NOTE — CONSULTS
[facility]   Pulmonary   Consult Note 2022  Edson Thornton MRN: [id number]    We were consulted for evaluation of bronchial hemorrhage.     Assessment & Plan      Mr. Apgar is a 57M s/p bilateral lung transplant who developed bronchial hemorrhage on  from a routine transbronchial biopsy.  He has been stable with improving oxygenation and improving CXR, so is safe to discharge.    Recommendations:    - Hold LMWH until Monday, then resume bridging and warfarin as initially planned   - OK to discharge from a pulmonary perspective.  Follow up with transplant team as planned.    Thank you for allowing us to care for this patient.  We will sign off at this time.  Please don't hesitate to page or call with any questions or concerns.    Patient seen & discussed w/  Dr. Rubio, who agrees with the above assessment and plan.    Monie Lugo M.D.  Pulmonary and Critical Care Medicine Fellow  2022           History of Present Illness:   Edson Thornton is a 57 year old male who presented to [facility] on 2022 with bronchial hemorrhage after transbronchial biopsy.    He is still coughing up some dried blood, but it is less than it was yesterday.  He feels much more awake and alert and ready to go home.  He is now on room air.  His Hgb has drifted down a little from 11.2 to 10.3 to 9.9.  CXR is improving.    He states that he took his last dose of LMWH about 24-hours prior to the procedure.  He had held his warfarin and took his vitamin K as prescribed.  He is not any aspirin or other blood thinning agents.          Review of Symptoms:   10-point ROS reviewed, & found negative w/ exceptions noted in the HPI.          Past Medical History:     Past Medical History:   Diagnosis Date    BRENNAN (acute kidney injury) (H) 10/17/2021    Anxiety     Depression     HLD (hyperlipidemia)     HTN (hypertension)     Hypothyroidism     ILD (interstitial lung disease) (H)     SALTY on CPAP     Oxygen dependent      BL 4L since ~6/2021    Primary hypertension 2/28/2022    Rheumatoid arthritis (H)     signs ~5/2020, dx 5/2021    S/P lung transplant (H) 10/16/2021    Shock liver 10/17/2021    Steroid-induced hyperglycemia     Traction bronchiectasis (H)        Past Surgical History:   Procedure Laterality Date    BRONCHOSCOPY (RIGID OR FLEXIBLE), DIAGNOSTIC N/A 1/26/2022    Procedure: BRONCHOSCOPY, WITH BRONCHOALVEOLAR LAVAGE AND BIOPSY;  Surgeon: Perlman, David Morris, MD;  Location: [facility]    BRONCHOSCOPY (RIGID OR FLEXIBLE), DIAGNOSTIC N/A 4/19/2022    Procedure: BRONCHOSCOPY, WITH BRONCHOALVEOLAR LAVAGE AND BIOPSY;  Surgeon: Sherine Merrill MD;  [facility]    BRONCHOSCOPY (RIGID OR FLEXIBLE), DIAGNOSTIC N/A 6/21/2022    Procedure: BRONCHOSCOPY, WITH BRONCHOALVEOLAR LAVAGE AND BIOPSY;  Surgeon: Aneesh Rubio MD;  [facility]    COLONOSCOPY W/ BIOPSIES AND POLYPECTOMY  07/21/2020    CV CORONARY ANGIOGRAM N/A 09/08/2021    Procedure: Coronary Angiogram with possible intervention;  Surgeon: Jovon Bullock MD;  Location:  HEART CARDIAC CATH LAB    CV RIGHT HEART CATH MEASUREMENTS RECORDED N/A 09/08/2021    Procedure: Right Heart Cath;  Surgeon: Jovon Bullock MD;  Location:  HEART CARDIAC CATH LAB    ESOPHAGOSCOPY, GASTROSCOPY, DUODENOSCOPY (EGD), COMBINED N/A 10/23/2021    Procedure: ESOPHAGOGASTRODUODENOSCOPY (EGD);  Surgeon: Miquel Pisano MD;  Location:  GI    ESOPHAGOSCOPY, GASTROSCOPY, DUODENOSCOPY (EGD), COMBINED N/A 11/02/2021    Procedure: ESOPHAGOGASTRODUODENOSCOPY (EGD);  Surgeon: Daniel Ortiz MD;  Location:  GI    ESOPHAGOSCOPY, GASTROSCOPY, DUODENOSCOPY (EGD), COMBINED N/A 11/5/2021    Procedure: ESOPHAGOGASTRODUODENOSCOPY (EGD);  Surgeon: Ronnell Hernandez MD;  Location:  GI    EXTRACTION(S) DENTAL N/A 09/22/2021    Procedure: EXTRACTION tooth #19;  Surgeon: Deepak Tobin DDS;  Location: [facility]    HERNIA REPAIR      IR CHEST TUBE PLACEMENT  NON-TUNNELLED LEFT  11/03/2021    PICC TRIPLE LUMEN PLACEMENT Left 11/04/2021    5FR TL PICC. Right non occlusive thrombus subclavian vein.    REPLACE GASTROJEJUNOSTOMY TUBE, PERCUTANEOUS N/A 11/9/2021    Procedure: REPLACEMENT, GASTROJEJUNOSTOMY TUBE, PERCUTANEOUS;  Surgeon: Hernando Rodriguez MD;  Location: [facility]    right acl      TRACHEOSTOMY PERCUTANEOUS N/A 10/29/2021    Procedure: Percutaneous Tracheostomy,;  Surgeon: Celine Jenkins MD;  Location: [facility]    TRANSPLANT LUNG RECIPIENT SINGLE X2 Bilateral 10/16/2021    Procedure: TRANSPLANT, LUNG, RECIPIENT, BILATERAL, Bronchoscopy, on-pump perfusion, bilateral clamshell sternotomy;  Surgeon: Yanick Corral MD;  Location: [facility]    XR ACROMIOCLAVICULAR JOINT BILATERAL              Allergies:     No Known Allergies          Outpatient Medications:     No current facility-administered medications on file prior to encounter.  acetaminophen (TYLENOL) 325 MG tablet, 3 tablets (975 mg) by Oral or Feeding Tube route every 8 hours as needed for mild pain  amLODIPine (NORVASC) 10 MG tablet, Take 1 tablet (10 mg) by mouth At Bedtime  calcium carbonate 600 mg-vitamin D 400 units (CALTRATE) 600-400 MG-UNIT per tablet, 1 tablet by Oral or Feeding Tube route 2 times daily (with meals)  diclofenac (VOLTAREN) 1 % topical gel, Apply 4 g topically 4 times daily Both hands  enoxaparin ANTICOAGULANT (LOVENOX) 80 MG/0.8ML syringe, Directions: 1. Hold syringe with needle towards the ground. 2. Very slowly squirt out the extra medication using the plunger until the bottom of the air bubble is at 0.7ML. 3. Leave the air bubble in the syringe. 4. Inject dose as directed. Inject subcutaneously into abdomen every 12 hours or as directed by the Coumadin clinic.  fluticasone (FLONASE) 50 MCG/ACT nasal spray, Spray 1 spray into both nostrils daily  insulin glargine (LANTUS PEN) 100 UNIT/ML pen, Inject 3 Units Subcutaneous every morning  levothyroxine (SYNTHROID/LEVOTHROID) 25 MCG  tablet, Take 1 tablet (25 mcg) by mouth daily  Magnesium Glycinate POWD, 300 mg 2 times daily Take 3 tablets (100mg each) two times daily  Melatonin 10 MG TABS tablet, Take 1 tablet (10 mg) by mouth nightly as needed for sleep  metoprolol tartrate (LOPRESSOR) 25 MG tablet, Take 2 tablets (50 mg) by mouth 2 times daily  multivitamin w/minerals (THERA-VIT-M) tablet, Take 1 tablet by mouth daily  MYFORTIC (BRAND) 360 MG EC tablet, Take 2 tablets (720 mg) by mouth 2 times daily  nystatin (MYCOSTATIN) 045953 UNIT/ML suspension, Swish and swallow 10 mLs (1,000,000 Units) in mouth 4 times daily  ondansetron (ZOFRAN-ODT) 4 MG ODT tab, Take 1 tablet (4 mg) by mouth every 6 hours as needed for nausea  pantoprazole (PROTONIX) 40 MG EC tablet, Take 1 tablet (40 mg) by mouth 2 times daily (before meals)  rosuvastatin (CRESTOR) 10 MG tablet, Take 1 tablet (10 mg) by mouth daily  senna-docusate (SENOKOT-S/PERICOLACE) 8.6-50 MG tablet, Take 2 tablets by mouth 2 times daily  tacrolimus (GENERIC EQUIVALENT) 1 MG capsule, Take 3 capsules (3 mg) by mouth every morning AND 2 capsules (2 mg) every evening.  levalbuterol (XOPENEX HFA) 45 MCG/ACT inhaler, Inhale 2 puffs into the lungs every 4 hours as needed for wheezing or shortness of breath / dyspnea (Patient not taking: Reported on 6/20/2022)  sulfamethoxazole-trimethoprim (BACTRIM) 400-80 MG tablet, Take 1 tablet by mouth daily  warfarin ANTICOAGULANT (COUMADIN) 1 MG tablet, Take one to two tablets by mouth daily OR as directed by Anticoagulation Clinic  warfarin ANTICOAGULANT (COUMADIN) 1 MG tablet, Take 2 tablets (2 mg) by mouth every evening Further dosing per anticoagulation clinic (Patient taking differently: Take 0.5-1 mg by mouth every evening Further dosing per anticoagulation clinic)  warfarin ANTICOAGULANT (COUMADIN) 2 MG tablet, Take 2-3 tablets daily or as directed  zolpidem (AMBIEN) 10 MG tablet, 1/2 to 1 tablet as needed              Family History:     Family History  "  Problem Relation Age of Onset    Diabetes Type 1 Mother     Heart Disease Mother     Chronic Obstructive Pulmonary Disease Mother     Rheumatoid Arthritis Father     Emphysema Paternal Grandfather                Social History:     Social History     Tobacco Use    Smoking status: Never Smoker    Smokeless tobacco: Never Used   Substance Use Topics    Alcohol use: Never    Drug use: Never             Physical Exam:   /80 (BP Location: Right arm)   Pulse 68   Temp 98.1  F (36.7  C) (Oral)   Resp 15   Ht 1.626 m (5' 4\")   Wt 69.8 kg (153 lb 14.4 oz)   SpO2 98%   BMI 26.42 kg/m      General: middle aged male, sitting up in bed, in no acute distress  HENT: external ears without visible abnormalities, no rhinorrhea, no epistaxis  Lungs: rales in RLL otherwise CTAB, on room air, no accessory muscle use  Heart: RRR, no murmurs  Abdomen: soft, non-distended, bowel tones present  Extremities: no pitting edema, no clubbing or cyanosis  Skin: no visible rashes, no mottling  Neurologic: moving all 4 extremities spontaneously, awake and alert  Psych: full affect, appropriate insight, appropriate judgment          Data:   Labs (all laboratory studies reviewed by me): notable labs in HPI above.    Imaging and other diagnostic testing (all imaging studies reviewed by me)    Chest xray 6/21 -> 6/22: RML opacity with some interval improvement; no pneumothorax      "

## 2022-06-22 NOTE — TELEPHONE ENCOUNTER
ANTICOAGULATION  MANAGEMENT: Discharge Review    Edson Thornton chart reviewed for anticoagulation continuity of care    Hospital Admission on 6/21-6/22 for bleeding in lungs after bronch.    Discharge disposition: Home    Results:    Recent labs: (last 7 days)     06/20/22  0757 06/21/22  0811   INR 1.18* 1.07     Anticoagulation inpatient management:     held warfarin due to bleeding     Anticoagulation discharge instructions:     Warfarin dosing: Hold warfarin and Lovenox until 6/27 and then resume both.    Bridging:Will resume Lovenox bridge on 6/27/22   INR goal change: No      Medication changes affecting anticoagulation: No    Additional factors affecting anticoagulation: No     PLAN     No adjustment to anticoagulation plan needed    Patient not contacted    Anticoagulation Calendar updated    Terrie Cedillo RN

## 2022-06-22 NOTE — PROGRESS NOTES
This is a recent snapshot of the patient's [facility] medical record.  For current drug dose and complete information and questions, call [phone] or In Basket pool, [facility] home infusion ([id number])  CSN Number:  [id number]

## 2022-06-22 NOTE — PROGRESS NOTES
A/woke for vitals, says breathing is okay, looks pink and comfortable, slept between cares  ---obs goals--  0000-no tests ordered for night shift  -no consults for night shift  -vitals are good for now  -not SOB at rest, sats are good for now  -baseline function  -possible home in am  0400-no tests ordered for night shift  -no consults for tonight  -vitals are good for now  -not SOB at rest, sats are good for now  -baseline function  -possibly home in am

## 2022-06-22 NOTE — PROGRESS NOTES
Observation goals:    -diagnostic tests and consults completed and resulted: yes  -vital signs normal or at patient baseline: yes   -dyspnea improved and O2 sats greater than 88% on room air or prior home oxygen levels: yes  -returns to baseline functional status: no   -safe disposition plan has been identified: yes

## 2022-06-23 LAB — BACTERIA BRONCH: NO GROWTH

## 2022-06-28 NOTE — PROGRESS NOTES
Rheumatology Progress Note-Fellow    Edson Thornton MRN# [id number]   Age: 57 year old YOB: 1965       Assessment & Plan:     Mr. Thornton is a 58 yo with hx of hypertension, hyperlipidemia, hypothyroidism, SALTY, diagnosed with rheumatoid arthritis in May 2021 in [place], NSIP/ILD s/p bilateral lung transplant on immunosupression seen today for follow up of seropositive rheumatoid arthritis.    The patient reports fatigue and some increased joint soreness in wrists and fingers with associated stiffness for a little over a month.  The patient is currently on prednisone 5 mg in the morning and 2.5 mg in the evening per transplant medicine without plans to taper at this time. He is also on Myfortic and tacrolimus for his transplant. Exam today is without evidence of synovitis. Previous hand xrays in 2020, prior to RA diagnosis, without evidence of erosive disease.     While patient does have some increased joint symptoms, these are not overly bothersome to him, and he is without synovitis on exam.  Discussed that we will obtain labs and continue to monitor at this time. Discussed follow up with ophthalmology as scheduled. If they are okay with HCQ, will consider starting this. If worsening pain in the meantime, could consider increasing prednisone.     Will obtain labs including repeat RF and CCP, as well as hand x-rays.    -Labs and xrays today:  Orders Placed This Encounter   Procedures     XR Hand Bilateral G/E 3 Views     CRP inflammation     ESR     Rheumatoid factor     Cyclic Citrullinated Peptide Antibody IgG   -Follow up with ophthalmology as scheduled  -Follow up in 3 months  -Contact clinic if worsening joint pain or swelling      Patient seen and discussed with rheumatology staff, Dr. Mello.       Hermelinda Hunter MD  Rheumatology Fellow    I saw the patient with the fellow.  My exam and recomendations are as described.      Hammad Mello MD       Subjective     Mr. Thornton is a 58 yo with hx of  hypertension, hyperlipidemia, hypothyroidism, SALTY, diagnosed with rheumatoid arthritis (+CCP, +RF) in May 2021 in [place], NSIP/ILD s/p bilateral lung transplant on immunosupression seen today for follow up of rheumatoid arthritis, previously cared for prior to transplant by Dr. Yoo in [place].    The patient reports he has had some pain in his wrists and fingers for a little over a month.  He notes some stiffness variably in the morning that can last a few hours.  Mr. Thornton reports some ongoing shortness of breath with activity that is chronic and no worse recently.  He has some cough productive of mostly clear sputum with some old appearing blood which has been lessening since his recent hospitalization for bronchial hemorrhage in the setting of bronchoscopy while on blood thinners.  The patient has been further tapered on prednisone to 5 mg in the morning and 2.5 mg in the evening.  He reports there are no plans to further taper prednisone at this time.      ROS     A 10 point ROS was performed with pertinent findings listed above.           Social History:     Social History     Socioeconomic History     Marital status: Single     Spouse name: Not on file     Number of children: Not on file     Years of education: Not on file     Highest education level: Not on file   Occupational History     Not on file   Tobacco Use     Smoking status: Never Smoker     Smokeless tobacco: Never Used   Substance and Sexual Activity     Alcohol use: Never     Drug use: Never     Sexual activity: Not on file   Other Topics Concern     Parent/sibling w/ CABG, MI or angioplasty before 65F 55M? Not Asked   Social History Narrative     Not on file     Social Determinants of Health     Financial Resource Strain: Not on file   Food Insecurity: Not on file   Transportation Needs: Not on file   Physical Activity: Not on file   Stress: Not on file   Social Connections: Not on file   Intimate Partner Violence: Not on file   Housing Stability: Not  on file             Family History:     Family History   Problem Relation Age of Onset     Diabetes Type 1 Mother      Heart Disease Mother      Chronic Obstructive Pulmonary Disease Mother      Rheumatoid Arthritis Father      Emphysema Paternal Grandfather        Objective     PHYSICAL EXAM  There were no vitals taken for this visit.  Wt Readings from Last 4 Encounters:   06/22/22 69.8 kg (153 lb 14.4 oz)   06/20/22 69.9 kg (154 lb)   04/19/22 73.7 kg (162 lb 7.7 oz)   04/18/22 71.7 kg (158 lb)       General: appears well, NAD  HEENT: Sclera non-injected, non-icteric. Oropharynx without ulcers or lesions. No cervical LAD noted.  CV: Regular rate and rhythm  Lung: Breath sounds present bilaterally, no wheezes rales or rubs.  Abdomen: Soft, non-distended  Neuro: Awake, alert, CN grossly intact.   Skin/Hair: no rashes or nail changes observed. No alopecia  Msk:   Shoulders: No tenderness to palpation along glenohumeral joint . No effusion, warmth. Forward flexion and abduction to 180 degrees  Elbows: with full extension and extension, without effusion, erythema or increased warmth  Wrists: right with tenderness at right ulnar volar aspect. Bilaterally with full extension and extension, without effusion, erythema or increased warmth  Hands: MCPs, PIPs, DIPs without effusion, erythema or increased warmth  L knee: No effusion, increased warmth or erythema. Flexion >120 degrees, full extension  R knee: No effusion, increased warmth or erythema. Flexion >120 degrees, full extension  Bilateral ankles: inversion/eversion, plantar and dorsiflexion intact. No joint effusion.  Feet: No tenderness of palpation to MTPs    DATA:    CBC:  Recent Labs   Lab Test 06/22/22  0620 06/21/22  1629 06/20/22  0757   WBC 6.2 7.3 5.5   RBC 3.73* 3.87* 4.20*   HGB 9.9* 10.3* 11.2*   HCT 31.4* 32.7* 35.2*   MCV 84 85 84   MCH 26.5 26.6 26.7   MCHC 31.5 31.5 31.8   RDW 14.7 14.7 14.4    206 210       BMP:  Recent Labs   Lab Test  06/22/22  1248 06/22/22  0808 06/21/22  2132 06/21/22  1752 06/21/22  1629 06/21/22  0809 06/20/22  0757 06/14/22  0826 04/19/22  0803 04/18/22  1023   NA  --   --   --   --  142  --  142  --   --  144   POTASSIUM  --   --   --   --  4.9*  --  4.0  --   --  4.1   CHLORIDE  --   --   --   --  108*  --  104 107   < > 109   CO2  --   --   --   --  23  --  28  --   --  26   ANIONGAP  --   --   --   --  11  --  10  --   --  9   * 97 110*   < > 126*   < > 116*  --    < > 96   BUN  --   --   --   --  24.3*  --  23  --   --  25   CR  --   --   --   --  1.24*  --  1.46*  --   --  1.20   GFRESTIMATED  --   --   --   --  68  --  56*  --   --  71   ERIK  --   --   --   --  10.1*  --  10.1  --   --  9.5    < > = values in this interval not displayed.       LFT:  Recent Labs   Lab Test 03/02/22  1631 12/23/21  0714 12/12/21  0519   PROTTOTAL 6.5* 6.0* 5.8*   ALBUMIN 3.5 2.8* 2.5*   BILITOTAL 0.2 0.2 0.2   ALKPHOS 69 91 104   AST 24 22 17   ALT 32 31 26       No results found for: CKTOTAL  TSH   Date Value Ref Range Status   11/19/2021 3.17 0.40 - 4.00 mU/L Final     No results found for: URIC    Inflammatory markers  Lab Results   Component Value Date    CRP 53.0 10/29/2021    CRP 23.0 10/14/2021    CRP 17.0 10/12/2021     Lab Results   Component Value Date    SED 87 10/29/2021     Ferritin   Date Value Ref Range Status   09/09/2021 1,049 (H) 26 - 388 ng/mL Final       UA RESULTS:  Recent Labs   Lab Test 11/01/21  1336 10/25/21  1507 10/22/21  1641   COLOR Yellow Light Yellow Light Yellow   APPEARANCE Clear Clear Clear   URINEGLC Negative Negative Negative   URINEBILI Negative Negative Negative   URINEKETONE Negative Negative Negative   SG 1.025 1.010 1.005   UBLD Moderate* Negative Negative   URINEPH 5.5 5.5 7.5*   PROTEIN 30 * 20 * 50 *   NITRITE Negative Negative Negative   LEUKEST Negative Negative Negative   RBCU 71* 1 1   WBCU 0 2 3         Autoimmunity labs:     No results found for: RHF  No results found for:  CCPIGG  No results found for: ANCA  No results found for: R9SBZJO  No results found for: I2VBIWR  No results found for: MARIN  No results found for: DNA  Lab Results   Component Value Date    RNPIGG Negative 09/07/2021    SMIGG Negative 09/07/2021    SSAIGG Negative 09/07/2021    SSBIGG Negative 09/07/2021       IMAGING:    Rhoda Hunter MD  Rheumatology Fellow         Addendum:  Imaging Results:     Results for orders placed or performed during the hospital encounter of 06/21/22   XR Chest Port 1 View    Narrative    Exam: XR CHEST PORT 1 VIEW, 6/21/2022 10:15 AM    Comparison: Chest x-ray 6/20/2022, 4/19/2022    History: POST BLEEDING FROM BIOPSIES    Findings:  Portable AP view of the chest. Postsurgical changes of bilateral lung  transplant with intact clam shell sternotomy wires and mediastinal  clips. Trachea is midline. Cardiomediastinal silhouette is partially  obscured New patchy right basilar opacities. Increased hazy  opacification along the lingula and left lower lobe. No appreciable  pneumothorax. Endoscopy clips project over the epigastric region.  Chronic right clavicle deformity.       Impression    Impression:   1. New patchy right basilar airspace opacities, consistent with  hemorrhage.   2. Increased hazy opacification along the lingula and left lower lobe.    3. No appreciable pneumothorax.     I have personally reviewed the examination and initial interpretation  and I agree with the findings.    DAVIN ANGUIANO MD         SYSTEM ID:  [id number]   XR Chest Port 1 View    Narrative    Exam: XR CHEST PORT 1 VIEW, 6/22/2022 8:41 AM    Indication: follow up bronchial hemorrhage after bronchoscopy 6/21    Comparison: 6/21/2022    Findings:   Intact sternotomy. Improving but not resolved consolidative opacity in  the right middle lobe. Linear opacities at the left lung base likely  subsegmental atelectasis. Opacity over the left midlung is fluid in  the fissure better seen on lateral chest x-ray of  6/20/2022. No new  airspace disease identified. Heart and pulmonary vasculature within  normal limits.      Impression    Impression: Improving but not resolved right lobe airspace opacity.    MAURICIO NAVAS MD         SYSTEM ID:  [id number]             Addendum:  Laboratory Investigations:   Laboratory investigations performed today for which results were available at the time of this note are listed below.  Pending labs will be reported in a separate letter.  No visits with results within 1 Day(s) from this visit.   Latest known visit with results is:   Admission on 06/21/2022, Discharged on 06/22/2022   Component Date Value Ref Range Status     INR 06/21/2022 1.07  0.85 - 1.15 Final     aPTT 06/21/2022 31  22 - 38 Seconds Final     GLUCOSE BY METER POCT 06/21/2022 130 (A) 70 - 99 mg/dL Final     CMV DNA IU/mL 06/21/2022 Not Detected  Not Detected IU/mL Final     Gram Stain Result 06/21/2022 >25 PMNs/low power field   Final     Gram Stain Result 06/21/2022 No organisms seen   Final     Culture 06/21/2022 No growth after 7 days   Preliminary     Culture 06/21/2022 No growth after 7 days   Preliminary     Final Diagnosis 06/21/2022    Final                    Value:This result contains rich text formatting which cannot be displayed here.     Clinical Information 06/21/2022    Final                    Value:This result contains rich text formatting which cannot be displayed here.     Gross Description 06/21/2022    Final                    Value:This result contains rich text formatting which cannot be displayed here.     Performing Labs 06/21/2022    Final                    Value:This result contains rich text formatting which cannot be displayed here.     Culture 06/21/2022 No Growth   Final     Acid Fast Stain 06/21/2022 No acid fast bacilli seen   Preliminary    Performed by [facility],[address] [place] [address],[place] [address] [phone], Magali Kearney MD, Lab. Director     Acid Fast Stain  06/21/2022 No acid fast bacilli seen   Preliminary    Comment: Performed by [facility],[address], [facility] [place] [address] [phone], Magali Kearney MD, Lab. Director                             This is an appended report. These results have been appended to a previously preliminary verified report.     Color 06/21/2022 Colorless  Colorless, Yellow Final     Clarity 06/21/2022 Clear  Clear, Bloody Final     Total Nucleated Cells 06/21/2022 152  /uL Final     Cell Count Fluid Source 06/21/2022 Bronchial Alveolar Lavage, Bronchus   Final     Case Report 06/21/2022    Final                    Value:Surgical Pathology Report                         Case: [id number]                                  Authorizing Provider:  Aneesh Rubio MD             Collected:           06/21/2022 09:18 AM          Ordering Location:     [facility]          Received:            06/21/2022 10:10 AM                                 Endoscopy                                                                    Pathologist:           Katt Pereira MD                                                   Specimen:    Bronchus, RIGHT MIDDLE AND RIGHT LOWER LOBE                                                 Final Diagnosis 06/21/2022    Final                    Value:This result contains rich text formatting which cannot be displayed here.     Clinical Information 06/21/2022    Final                    Value:This result contains rich text formatting which cannot be displayed here.     Gross Description 06/21/2022    Final                    Value:This result contains rich text formatting which cannot be displayed here.     Microscopic Description 06/21/2022    Final                    Value:This result contains rich text formatting which cannot be displayed here.     Performing Labs 06/21/2022    Final                    Value:This result contains rich text formatting which cannot be displayed here.     Adenovirus  06/21/2022 Not Detected  Not Detected Final     Coronavirus 06/21/2022 Not Detected  Not Detected Final    This test detects Coronavirus 229E, HKU1, NL63 and OC43 but does not distinguish between them. It does not detect MERS ( Respiratory Syndrome), SARS (Severe Acute Respiratory Syndrome) or 2019-nCoV (Novel 2019) Coronavirus.     Human Metapneumovirus 06/21/2022 Not Detected  Not Detected Final     Human Rhin/Enterovirus 06/21/2022 Not Detected  Not Detected Final     Influenza A 06/21/2022 Not Detected  Not Detected Final     Influenza A, H1 06/21/2022 Not Detected  Not Detected Final     Influenza A 2009 H1N1 06/21/2022 Not Detected  Not Detected Final     Influenza A, H3 06/21/2022 Not Detected  Not Detected Final     Influenza B 06/21/2022 Not Detected  Not Detected Final     Parainfluenza Virus 1 06/21/2022 Not Detected  Not Detected Final     Parainfluenza Virus 2 06/21/2022 Not Detected  Not Detected Final     Parainfluenza Virus 3 06/21/2022 Not Detected  Not Detected Final     Parainfluenza Virus 4 06/21/2022 Not Detected  Not Detected Final     Respiratory Syncytial Virus A 06/21/2022 Not Detected  Not Detected Final     Respiratory Syncytial Virus B 06/21/2022 Not Detected  Not Detected Final     Chlamydia Pneumoniae 06/21/2022 Not Detected  Not Detected Final     Mycoplasma Pneumoniae 06/21/2022 Not Detected  Not Detected Final     % Neutrophils 06/21/2022 5  % Final     % Lymphocytes 06/21/2022 13  % Final     % Monocyte/Macrophages 06/21/2022 83  % Final     % Eosinophils 06/21/2022 1  % Final     Bronchoscopy 06/21/2022    Final                    Value:[facility]  Endoscopy Department-[facility]  _______________________________________________________________________________  Patient Name: Edson Apger           Procedure Date: 6/21/2022 8:25 AM  MRN: [id number]                       Account #: [id number]  YOB: 1965               Admit Type: Outpatient  Age: 57                               Gender: Male  Note Status: Finalized                Attending MD: DANIEL GARCIA MD  Pause for the cause: Pause for the cause completed Total Sedation Time: 42   minutes  _______________________________________________________________________________     Procedure:             Bronchoscopy  Indications:           Surveillance lung transplant  Providers:             Monie Lugo MD (fellow), DANIEL GARCIA MD, Pamela Patel RN, Zenia Nuno, Technician  Medicines:             Fentanyl 75 mcg IV, Midazolam 1 mg IV, Lidocaine                          applied to na                          res and subglottic space, Lido 1% w/ Epi                          1:100,000 mL 5 mg x2 to RML and 5 mg x3 to RLL,                          Topical thrombin to RLL  Requesting Physician:    Complications:         Significant bleeding requiring epinephrine,                          Significant hemorrhage requiring thrombin, Hypoxia  _______________________________________________________________________________  Procedure:             Pre-Anesthesia Assessment:                         - A History and Physical has been performed. Patient                          meds and allergies have been reviewed. The risks and                          benefits of the procedure and the sedation options and                          risks were discussed with the patient. All questions                          were answered and informed consent was obtained.                          Patient identification and proposed procedure were                          verified prior to the procedure by the physician                          , the                          nurse and the technician in the procedure room. Mental                          Status Examination: alert and oriented. Airway                          Examination: normal oropharyngeal airway.  Respiratory                          Examination: clear to auscultation. CV Examination:                          normal and regular rate and rhythm. Prior                          Anticoagulants: The patient has taken Coumadin                          (warfarin) and his INR was reversed yesterday with                          vitamin K. After reviewing the risks and benefits, the                          patient was deemed in satisfactory condition to                          undergo the procedure. The anesthesia plan was to use                          moderate conscious sedation. Immediately prior to                          administration of medications, the patient was                          re-assessed for adequacy to receive sedatives. The                                                    heart rate, respiratory rate, oxygen saturations,                          blood pressure, adequacy of pulmonary ventilation, and                          response to care were monitored throughout the                          procedure. The physical status of the patient was                          re-assessed after the procedure.                         After obtaining informed consent, the Bronchoscope was                          introduced through the left nostril and advanced to                          the tracheobronchial tree of both lungs. The patient                          tolerated the procedure. The total duration of the                          procedure was 42 minutes. Total fluoroscopy time was                          2.1 minutes.  Findings:       Larynx: A small sessile polyp was found on the left vocal cord and on        the right vocal cord. The polyp is non-obstructing the airway.       Trachea/Catarina Abnormalities: A medium sized partially obstructing                                  raised lesion was found proximally in the upper trachea.       Anastomoses Examination:       - International  Society for Heart & Lung Transplantation (ISHLT) Grading        System:       Anastomosis Location: right mainstem bronchus       - Ischemia and Necrosis: none (no ischemia or necrosis)       - Dehiscence: none (no dehiscence)       - Stenosis: none (no stenosis)       - Malacia: none (no malacia)       Anastomosis Location: left mainstem bronchus       - Ischemia and Necrosis: none (no ischemia or necrosis)       - Dehiscence: none (no dehiscence)       - Stenosis: none (no stenosis)       - Malacia: none (no malacia).       The remainder of the tracheobronchial tree is normal.       The bronchoscope was advanced until wedged at the desired location for        bronchoalveolar lavage. BAL was performed in the RML lateral segment        (B4) of the lung. 120 mL of fluid were instilled. 65 mL were returned.        The return was cellular and cloudy.       Transbronchial bi                          opsies were performed in the lateral segment of the        right middle lobe and in the lateral basal segment of the right lower        lobe using forceps. The procedure was guided by fluoroscopy.        Transbronchial biopsy technique was selected because the sampling site        was not accessible using standard endoscopic (bronchoscopic) techniques.        Six biopsy passes were performed. Eleven biopsy samples were obtained.       Estimated blood loss: 30 mL, which was treated with epinephrine and        thrombin.       The patient's condition was assessed prior to discharge.  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                         - A partially obstructing lesion was found in the                          upper trachea.                         - The surgical anastomosis at the right mainstem                          bron                          chus was  evaluated. The surgical anastomosis at                          the left mainstem bronchus was evaluated.                         - Bronchoalveolar lavage was performed.                         - Transbronchial lung biopsies were performed.                          Significant bleeding and hypoxia occured after RLL                          biopsy. Anesthesia was called to bedside, but                          hemostasis was obtained and intubation deferred.                         - The patient's condition upon discharge was fair.  Moderate Sedation:       Moderate (conscious) sedation was administered by the nurse and        supervised by the physician performing the procedure. The following        parameters were monitored: oxygen saturation, heart rate, blood        pressure, respiratory rate, EKG, adequacy of pulmonary ventilation, and        response to care. Total physician intraservice time was 42 minutes.  Recommendation:        - Observe patient in PACU for observatio                          n.                         He will be admitted for monitoring.  Attending Participation:       I was present and participated during the entire procedure, including        non-key portions.    Electronically Signed by: Dr. Aneesh Garcia  _____________  ANEESH GARCIA MD  6/21/2022 1:32:07 PM  I was physically present for the entire viewing portion of the exam.  __________________________  Signature of teaching physician  B4c/Eze GARCIA MD  Number of Addenda: 0    Note Initiated On: 6/21/2022 8:25 AM       WBC Count 06/21/2022 7.3  4.0 - 11.0 10e3/uL Final     RBC Count 06/21/2022 3.87 (A) 4.40 - 5.90 10e6/uL Final     Hemoglobin 06/21/2022 10.3 (A) 13.3 - 17.7 g/dL Final     Hematocrit 06/21/2022 32.7 (A) 40.0 - 53.0 % Final     MCV 06/21/2022 85  78 - 100 fL Final     MCH 06/21/2022 26.6  26.5 - 33.0 pg Final     MCHC 06/21/2022 31.5  31.5 - 36.5 g/dL Final     RDW 06/21/2022 14.7  10.0 - 15.0 % Final     Platelet Count  06/21/2022 206  150 - 450 10e3/uL Final     Creatinine 06/21/2022 1.24 (A) 0.67 - 1.17 mg/dL Final     Sodium 06/21/2022 142  136 - 145 mmol/L Final     Potassium 06/21/2022 4.9 (A) 3.4 - 4.5 mmol/L Final     Urea Nitrogen 06/21/2022 24.3 (A) 6.0 - 20.0 mg/dL Final     Chloride 06/21/2022 108 (A) 98 - 107 mmol/L Final     Carbon Dioxide (CO2) 06/21/2022 23  22 - 29 mmol/L Final     Anion Gap 06/21/2022 11  7 - 15 mmol/L Final     Glucose 06/21/2022 126 (A) 70 - 99 mg/dL Final     GFR Estimate 06/21/2022 68  >60 mL/min/1.73m2 Final    Effective December 21, 2021 eGFRcr in adults is calculated using the 2021 CKD-EPI creatinine equation which includes age and gender (Vipin et al., [facility], DOI: 10.1056/[id number])     Calcium 06/21/2022 10.1 (A) 8.6 - 10.0 mg/dL Final     GLUCOSE BY METER POCT 06/21/2022 119 (A) 70 - 99 mg/dL Final     GLUCOSE BY METER POCT 06/21/2022 110 (A) 70 - 99 mg/dL Final     WBC Count 06/22/2022 6.2  4.0 - 11.0 10e3/uL Final     RBC Count 06/22/2022 3.73 (A) 4.40 - 5.90 10e6/uL Final     Hemoglobin 06/22/2022 9.9 (A) 13.3 - 17.7 g/dL Final     Hematocrit 06/22/2022 31.4 (A) 40.0 - 53.0 % Final     MCV 06/22/2022 84  78 - 100 fL Final     MCH 06/22/2022 26.5  26.5 - 33.0 pg Final     MCHC 06/22/2022 31.5  31.5 - 36.5 g/dL Final     RDW 06/22/2022 14.7  10.0 - 15.0 % Final     Platelet Count 06/22/2022 190  150 - 450 10e3/uL Final     Hold Specimen 06/22/2022 [facility]   Final     GLUCOSE BY METER POCT 06/22/2022 97  70 - 99 mg/dL Final     GLUCOSE BY METER POCT 06/22/2022 103 (A) 70 - 99 mg/dL Final

## 2022-06-29 ENCOUNTER — OFFICE VISIT (OUTPATIENT)
Dept: RHEUMATOLOGY | Facility: CLINIC | Age: 57
End: 2022-06-29
Payer: COMMERCIAL

## 2022-06-29 ENCOUNTER — ANTICOAGULATION THERAPY VISIT (OUTPATIENT)
Dept: ANTICOAGULATION | Facility: CLINIC | Age: 57
End: 2022-06-29

## 2022-06-29 ENCOUNTER — ANCILLARY PROCEDURE (OUTPATIENT)
Dept: GENERAL RADIOLOGY | Facility: CLINIC | Age: 57
End: 2022-06-29
Payer: COMMERCIAL

## 2022-06-29 ENCOUNTER — LAB (OUTPATIENT)
Dept: LAB | Facility: CLINIC | Age: 57
End: 2022-06-29
Payer: COMMERCIAL

## 2022-06-29 ENCOUNTER — MYC MEDICAL ADVICE (OUTPATIENT)
Dept: TRANSPLANT | Facility: CLINIC | Age: 57
End: 2022-06-29

## 2022-06-29 VITALS
TEMPERATURE: 98 F | HEART RATE: 53 BPM | BODY MASS INDEX: 27.38 KG/M2 | SYSTOLIC BLOOD PRESSURE: 132 MMHG | WEIGHT: 159.5 LBS | DIASTOLIC BLOOD PRESSURE: 88 MMHG | OXYGEN SATURATION: 97 %

## 2022-06-29 DIAGNOSIS — Z94.2 S/P LUNG TRANSPLANT (H): ICD-10-CM

## 2022-06-29 DIAGNOSIS — M06.9 RHEUMATOID ARTHRITIS INVOLVING BOTH HANDS, UNSPECIFIED WHETHER RHEUMATOID FACTOR PRESENT (H): Primary | ICD-10-CM

## 2022-06-29 DIAGNOSIS — M06.9 RHEUMATOID ARTHRITIS INVOLVING BOTH HANDS, UNSPECIFIED WHETHER RHEUMATOID FACTOR PRESENT (H): ICD-10-CM

## 2022-06-29 DIAGNOSIS — D84.9 IMMUNOSUPPRESSED STATUS (H): ICD-10-CM

## 2022-06-29 DIAGNOSIS — I48.91 ATRIAL FIBRILLATION, UNSPECIFIED TYPE (H): Primary | ICD-10-CM

## 2022-06-29 DIAGNOSIS — Z79.899 ENCOUNTER FOR LONG-TERM (CURRENT) USE OF HIGH-RISK MEDICATION: ICD-10-CM

## 2022-06-29 DIAGNOSIS — Z94.2 LUNG REPLACED BY TRANSPLANT (H): ICD-10-CM

## 2022-06-29 DIAGNOSIS — I48.91 ATRIAL FIBRILLATION, UNSPECIFIED TYPE (H): ICD-10-CM

## 2022-06-29 DIAGNOSIS — E83.42 HYPOMAGNESEMIA: ICD-10-CM

## 2022-06-29 LAB
ANION GAP SERPL CALCULATED.3IONS-SCNC: 10 MMOL/L (ref 3–14)
BUN SERPL-MCNC: 20 MG/DL (ref 7–30)
CALCIUM SERPL-MCNC: 9.7 MG/DL (ref 8.5–10.1)
CHLORIDE BLD-SCNC: 109 MMOL/L (ref 94–109)
CO2 SERPL-SCNC: 27 MMOL/L (ref 20–32)
CREAT SERPL-MCNC: 1.24 MG/DL (ref 0.66–1.25)
CRP SERPL-MCNC: 17.3 MG/L (ref 0–8)
ERYTHROCYTE [DISTWIDTH] IN BLOOD BY AUTOMATED COUNT: 14.8 % (ref 10–15)
ERYTHROCYTE [SEDIMENTATION RATE] IN BLOOD BY WESTERGREN METHOD: 29 MM/HR (ref 0–20)
GFR SERPL CREATININE-BSD FRML MDRD: 68 ML/MIN/1.73M2
GLUCOSE BLD-MCNC: 145 MG/DL (ref 70–99)
HCT VFR BLD AUTO: 33.3 % (ref 40–53)
HGB BLD-MCNC: 10.3 G/DL (ref 13.3–17.7)
INR PPP: 1.15 (ref 0.85–1.15)
MAGNESIUM SERPL-MCNC: 1.7 MG/DL (ref 1.6–2.3)
MCH RBC QN AUTO: 26.6 PG (ref 26.5–33)
MCHC RBC AUTO-ENTMCNC: 30.9 G/DL (ref 31.5–36.5)
MCV RBC AUTO: 86 FL (ref 78–100)
PLATELET # BLD AUTO: 189 10E3/UL (ref 150–450)
POTASSIUM BLD-SCNC: 4 MMOL/L (ref 3.4–5.3)
RBC # BLD AUTO: 3.87 10E6/UL (ref 4.4–5.9)
SODIUM SERPL-SCNC: 146 MMOL/L (ref 133–144)
WBC # BLD AUTO: 5.6 10E3/UL (ref 4–11)

## 2022-06-29 PROCEDURE — G0463 HOSPITAL OUTPT CLINIC VISIT: HCPCS

## 2022-06-29 PROCEDURE — 86431 RHEUMATOID FACTOR QUANT: CPT | Performed by: PATHOLOGY

## 2022-06-29 PROCEDURE — 85027 COMPLETE CBC AUTOMATED: CPT | Performed by: PATHOLOGY

## 2022-06-29 PROCEDURE — 73130 X-RAY EXAM OF HAND: CPT | Mod: RT | Performed by: RADIOLOGY

## 2022-06-29 PROCEDURE — 85610 PROTHROMBIN TIME: CPT | Performed by: PATHOLOGY

## 2022-06-29 PROCEDURE — 86140 C-REACTIVE PROTEIN: CPT | Performed by: PATHOLOGY

## 2022-06-29 PROCEDURE — 83735 ASSAY OF MAGNESIUM: CPT | Performed by: PATHOLOGY

## 2022-06-29 PROCEDURE — 86833 HLA CLASS II HIGH DEFIN QUAL: CPT | Performed by: PATHOLOGY

## 2022-06-29 PROCEDURE — 80048 BASIC METABOLIC PNL TOTAL CA: CPT | Performed by: PATHOLOGY

## 2022-06-29 PROCEDURE — 36415 COLL VENOUS BLD VENIPUNCTURE: CPT | Performed by: PATHOLOGY

## 2022-06-29 PROCEDURE — 85652 RBC SED RATE AUTOMATED: CPT | Performed by: PATHOLOGY

## 2022-06-29 PROCEDURE — 99000 SPECIMEN HANDLING OFFICE-LAB: CPT | Performed by: PATHOLOGY

## 2022-06-29 PROCEDURE — 86832 HLA CLASS I HIGH DEFIN QUAL: CPT | Performed by: PATHOLOGY

## 2022-06-29 PROCEDURE — 86200 CCP ANTIBODY: CPT | Performed by: PATHOLOGY

## 2022-06-29 PROCEDURE — 99215 OFFICE O/P EST HI 40 MIN: CPT | Mod: GC | Performed by: STUDENT IN AN ORGANIZED HEALTH CARE EDUCATION/TRAINING PROGRAM

## 2022-06-29 ASSESSMENT — PATIENT HEALTH QUESTIONNAIRE - PHQ9: SUM OF ALL RESPONSES TO PHQ QUESTIONS 1-9: 9

## 2022-06-29 ASSESSMENT — PAIN SCALES - GENERAL: PAINLEVEL: NO PAIN (0)

## 2022-06-29 NOTE — PROGRESS NOTES
ANTICOAGULATION MANAGEMENT     Edson Thornton 57 year old male is on warfarin with subtherapeutic INR result. (Goal INR 2.0-3.0)    Recent labs: (last 7 days)     06/29/22  1111   INR 1.15       ASSESSMENT       Source(s): Chart Review and Patient/Caregiver Call       Warfarin doses taken: Patient just restarted warfarin on 6/27    Diet: No new diet changes identified    New illness, injury, or hospitalization: Yes: Patient had a bronch last week and had bleeding post procedure    Medication/supplement changes: None noted    Signs or symptoms of bleeding or clotting: No    Previous INR: Subtherapeutic    Additional findings: Patient is bridging with Lovenox 80mg Q 12 hours.  This was restarted on 6/27/22       PLAN     Recommended plan for temporary change(s) affecting INR     Dosing Instructions: booster dose then continue your current warfarin dose with next INR in 6 days       Summary  As of 6/29/2022    Full warfarin instructions:  6/29: 5 mg; Otherwise 4 mg every Mon, Thu; 3 mg all other days   Next INR check:  7/5/2022             Telephone call with Bret who verbalizes understanding and agrees to plan and who agrees to plan and repeated back plan correctly    Patient offered & declined to schedule next visit    Education provided: Goal range and significance of current result and Importance of therapeutic range    Plan made per [facility] anticoagulation protocol    Terrie Cedillo RN  Anticoagulation Clinic  6/29/2022    _______________________________________________________________________     Anticoagulation Episode Summary     Current INR goal:  2.0-3.0   TTR:  42.5 % (5.7 mo)   Target end date:  Indefinite   Send INR reminders to:  [facility]    Indications    Atrial fibrillation  unspecified type (H) [I48.91]           Comments:  Zay (p) [phone]  (f) [phone]         Anticoagulation Care Providers     Provider Role Specialty Phone number    Abiodun Woods MD Referring  Pulmonary Disease [phone]

## 2022-06-29 NOTE — NURSING NOTE
Chief Complaint   Patient presents with     RECHECK       /88   Pulse 53   Temp 98  F (36.7  C) (Oral)   Wt 72.3 kg (159 lb 8 oz)   SpO2 97%   BMI 27.38 kg/m      Ehsan Tong on 6/29/2022 at 9:32 AM

## 2022-06-29 NOTE — PATIENT INSTRUCTIONS
-Labs today  -Xrays today  -Follow up with ophthalmology as scheduled  -Follow up in 3 months  -Contact clinic if worsening joint pain or swelling

## 2022-06-30 ENCOUNTER — ANTICOAGULATION THERAPY VISIT (OUTPATIENT)
Dept: ANTICOAGULATION | Facility: CLINIC | Age: 57
End: 2022-06-30

## 2022-06-30 ENCOUNTER — DOCUMENTATION ONLY (OUTPATIENT)
Dept: ANTICOAGULATION | Facility: CLINIC | Age: 57
End: 2022-06-30

## 2022-06-30 DIAGNOSIS — I48.91 ATRIAL FIBRILLATION, UNSPECIFIED TYPE (H): Primary | ICD-10-CM

## 2022-06-30 LAB
CCP AB SER IA-ACNC: 15 U/ML
RHEUMATOID FACT SER NEPH-ACNC: <6 IU/ML

## 2022-06-30 RX ORDER — ENOXAPARIN SODIUM 100 MG/ML
60 INJECTION SUBCUTANEOUS EVERY 12 HOURS
Qty: 6 ML | Refills: 1 | Status: SHIPPED | OUTPATIENT
Start: 2022-06-30 | End: 2022-07-01

## 2022-06-30 RX ORDER — WARFARIN SODIUM 2 MG/1
TABLET ORAL
Qty: 168 TABLET | Refills: 1 | Status: SHIPPED | OUTPATIENT
Start: 2022-06-30 | End: 2022-11-15

## 2022-06-30 RX ORDER — WARFARIN SODIUM 1 MG/1
TABLET ORAL
Qty: 90 TABLET | Refills: 1 | Status: ON HOLD | OUTPATIENT
Start: 2022-06-30 | End: 2023-03-14

## 2022-06-30 NOTE — PROGRESS NOTES
Lovenox 60mL syringes sent to [facility] per pt request.  [place]        RX for warfarin 1 mg and 2 mg tablets sent to [facility] per patient request.  Mesha Chau RN

## 2022-07-01 DIAGNOSIS — I48.91 ATRIAL FIBRILLATION, UNSPECIFIED TYPE (H): ICD-10-CM

## 2022-07-01 LAB
DONOR IDENTIFICATION: NORMAL
DQB5: 1411
DSA COMMENTS: NORMAL
DSA PRESENT: YES
DSA TEST METHOD: NORMAL
ORGAN: NORMAL
SA 1 CELL: NORMAL
SA 1 TEST METHOD: NORMAL
SA 2 CELL: NORMAL
SA 2 TEST METHOD: NORMAL
SA1 HI RISK ABY: NORMAL
SA1 MOD RISK ABY: NORMAL
SA2 HI RISK ABY: NORMAL
SA2 MOD RISK ABY: NORMAL
ZZZSA 1  COMMENTS: NORMAL
ZZZSA 2 COMMENTS: NORMAL

## 2022-07-01 RX ORDER — ENOXAPARIN SODIUM 100 MG/ML
80 INJECTION SUBCUTANEOUS EVERY 12 HOURS
Qty: 8 ML | Refills: 1 | Status: SHIPPED | OUTPATIENT
Start: 2022-07-01 | End: 2022-07-12

## 2022-07-05 LAB
UNACCEPTABLE ANTIGENS: NORMAL
UNOS CPRA: 26

## 2022-07-06 ENCOUNTER — ANTICOAGULATION THERAPY VISIT (OUTPATIENT)
Dept: ANTICOAGULATION | Facility: CLINIC | Age: 57
End: 2022-07-06

## 2022-07-06 DIAGNOSIS — I48.91 ATRIAL FIBRILLATION, UNSPECIFIED TYPE (H): Primary | ICD-10-CM

## 2022-07-06 NOTE — PROGRESS NOTES
ANTICOAGULATION MANAGEMENT     Edson Thornton 57 year old male is on warfarin with subtherapeutic INR result. (Goal INR 2.0-3.0)    Recent labs: (last 7 days)     07/06/22  0914   INR 1.43*       ASSESSMENT       Source(s): Chart Review and Patient/Caregiver Call       Warfarin doses taken: Warfarin taken as instructed    Diet: No new diet changes identified    New illness, injury, or hospitalization: No    Medication/supplement changes: None noted    Signs or symptoms of bleeding or clotting: No    Previous INR: Subtherapeutic    Additional findings: Patient is bridging with lovenox 80mg Q 12 hours.  Patient does not need a refill on Lovenox.        PLAN     Recommended plan for temporary change(s) affecting INR     Dosing Instructions: Increase your warfarin dose (13% change) with next INR in 6 days       Summary  As of 7/6/2022    Full warfarin instructions:  3 mg every Sun, Wed; 4 mg all other days   Next INR check:  7/12/2022             Telephone call with Bret who verbalizes understanding and agrees to plan and who agrees to plan and repeated back plan correctly    Patient using outside facility for labs    Education provided: Goal range and significance of current result, Importance of therapeutic range, Importance of following up at instructed interval and Lovenox/Heparin education provided: prescribed dose and frequency     Plan made per [facility] anticoagulation protocol    Terrie Cedillo RN  Anticoagulation Clinic  7/6/2022    _______________________________________________________________________     Anticoagulation Episode Summary     Current INR goal:  2.0-3.0   TTR:  40.9 % (6 mo)   Target end date:  Indefinite   Send INR reminders to:  [facility]    Indications    Atrial fibrillation  unspecified type (H) [I48.91]           Comments:  Zay (p) [phone]  (f) [phone]         Anticoagulation Care Providers     Provider Role Specialty Phone number    Abiodun Woods MD  Referring Pulmonary Disease [phone]

## 2022-07-11 DIAGNOSIS — I48.91 ATRIAL FIBRILLATION, UNSPECIFIED TYPE (H): ICD-10-CM

## 2022-07-12 ENCOUNTER — ANTICOAGULATION THERAPY VISIT (OUTPATIENT)
Dept: ANTICOAGULATION | Facility: CLINIC | Age: 57
End: 2022-07-12

## 2022-07-12 DIAGNOSIS — I48.91 ATRIAL FIBRILLATION, UNSPECIFIED TYPE (H): ICD-10-CM

## 2022-07-12 DIAGNOSIS — I48.91 ATRIAL FIBRILLATION, UNSPECIFIED TYPE (H): Primary | ICD-10-CM

## 2022-07-12 LAB — INR (EXTERNAL): 1.78 (ref 0.9–1.1)

## 2022-07-12 RX ORDER — ENOXAPARIN SODIUM 100 MG/ML
INJECTION SUBCUTANEOUS
Qty: 10 ML | Refills: 1 | Status: SHIPPED | OUTPATIENT
Start: 2022-07-12 | End: 2022-09-12

## 2022-07-12 NOTE — PROGRESS NOTES
ANTICOAGULATION MANAGEMENT     Edson Thornton 57 year old male is on warfarin with subtherapeutic INR result. (Goal INR 2.0-3.0)    Recent labs: (last 7 days)     07/12/22  0920   INR 1.78*       ASSESSMENT       Source(s): Chart Review and Patient/Caregiver Call       Warfarin doses taken: Warfarin taken as instructed    Diet: No new diet changes identified    New illness, injury, or hospitalization: No    Medication/supplement changes: None noted    Signs or symptoms of bleeding or clotting: No    Previous INR: Subtherapeutic    Additional findings: Bridging with Enoxaparin until INR >= 2.0       PLAN     Recommended plan for temporary change(s) affecting INR     Dosing Instructions: booster dose then continue your current warfarin dose with next INR in 3 days       Summary  As of 7/12/2022    Full warfarin instructions:  7/12: 7 mg; Otherwise 3 mg every Sun, Wed; 4 mg all other days   Next INR check:  7/15/2022             Telephone call with Bret who verbalizes understanding and agrees to plan and who agrees to plan and repeated back plan correctly    Patient using outside facility for labs    Education provided: Goal range and significance of current result and Contact [phone] with any changes, questions or concerns.     Plan made per [facility] anticoagulation protocol    ESSIE COLÓN RN  Anticoagulation Clinic  7/12/2022    _______________________________________________________________________     Anticoagulation Episode Summary     Current INR goal:  2.0-3.0   TTR:  39.6 % (6.2 mo)   Target end date:  Indefinite   Send INR reminders to:  [facility]    Indications    Atrial fibrillation  unspecified type (H) [I48.91]           Comments:  Zay (p) [phone]  (f) [phone]         Anticoagulation Care Providers     Provider Role Specialty Phone number    Abiodun Woods MD Referring Pulmonary Disease [phone]

## 2022-07-15 ENCOUNTER — ANTICOAGULATION THERAPY VISIT (OUTPATIENT)
Dept: ANTICOAGULATION | Facility: CLINIC | Age: 57
End: 2022-07-15

## 2022-07-15 DIAGNOSIS — I48.91 ATRIAL FIBRILLATION, UNSPECIFIED TYPE (H): Primary | ICD-10-CM

## 2022-07-15 LAB — INR (EXTERNAL): 1.95 (ref 0.9–1.1)

## 2022-07-15 NOTE — PROGRESS NOTES
ANTICOAGULATION MANAGEMENT     Edson Thornton 57 year old male is on warfarin with subtherapeutic INR result. (Goal INR 2.0-3.0)    Recent labs: (last 7 days)     07/15/22  0851   INR 1.95*       ASSESSMENT       Source(s): Chart Review and Patient/Caregiver Call       Warfarin doses taken: Warfarin taken as instructed    Diet: No new diet changes identified    New illness, injury, or hospitalization: No    Medication/supplement changes: None noted    Signs or symptoms of bleeding or clotting: No    Previous INR: Subtherapeutic    Additional findings: ok to stop Lovenox bridge, booster dose and maintenance dose increase.       PLAN     Recommended plan for temporary change(s) and ongoing change(s) affecting INR     Dosing Instructions: booster dose then Increase your warfarin dose (7.7% change) with next INR in 4 days       Summary  As of 7/15/2022    Full warfarin instructions:  7/15: 5 mg; Otherwise 4 mg every day   Next INR check:  7/19/2022             Telephone call with Bret who verbalizes understanding and agrees to plan and who agrees to plan and repeated back plan correctly    Patient using outside facility for labs    Education provided: Goal range and significance of current result and Contact [phone] with any changes, questions or concerns.     Plan made with [facility] Pharmacist Usha COLÓN, RN  Anticoagulation Clinic  7/15/2022    _______________________________________________________________________     Anticoagulation Episode Summary     Current INR goal:  2.0-3.0   TTR:  39.0 % (6.3 mo)   Target end date:  Indefinite   Send INR reminders to:  [facility]    Indications    Atrial fibrillation  unspecified type (H) [I48.91]           Comments:  Zay (p) [phone]  (f) [phone]         Anticoagulation Care Providers     Provider Role Specialty Phone number    Abiodun Woods MD Referring Pulmonary Disease [phone]

## 2022-07-19 ENCOUNTER — LAB (OUTPATIENT)
Dept: LAB | Facility: CLINIC | Age: 57
End: 2022-07-19
Payer: COMMERCIAL

## 2022-07-19 ENCOUNTER — ANTICOAGULATION THERAPY VISIT (OUTPATIENT)
Dept: ANTICOAGULATION | Facility: CLINIC | Age: 57
End: 2022-07-19

## 2022-07-19 DIAGNOSIS — Z94.2 S/P LUNG TRANSPLANT (H): ICD-10-CM

## 2022-07-19 DIAGNOSIS — N18.31 CHRONIC KIDNEY DISEASE, STAGE 3A (H): ICD-10-CM

## 2022-07-19 DIAGNOSIS — I48.91 ATRIAL FIBRILLATION, UNSPECIFIED TYPE (H): Primary | ICD-10-CM

## 2022-07-19 DIAGNOSIS — Z79.899 ENCOUNTER FOR LONG-TERM (CURRENT) USE OF HIGH-RISK MEDICATION: ICD-10-CM

## 2022-07-19 DIAGNOSIS — E83.42 HYPOMAGNESEMIA: ICD-10-CM

## 2022-07-19 DIAGNOSIS — R79.1 ELEVATED INR: ICD-10-CM

## 2022-07-19 DIAGNOSIS — D84.9 IMMUNOSUPPRESSED STATUS (H): ICD-10-CM

## 2022-07-19 LAB
BACTERIA BRONCH: NO GROWTH
INR (EXTERNAL): 1.8 (ref 0.9–1.1)

## 2022-07-19 PROCEDURE — 80197 ASSAY OF TACROLIMUS: CPT

## 2022-07-19 NOTE — PROGRESS NOTES
ANTICOAGULATION MANAGEMENT     Edson Thornton 57 year old male is on warfarin with subtherapeutic INR result. (Goal INR 2.0-3.0)    Recent labs: (last 7 days)     07/19/22  0000   INR 1.80*       ASSESSMENT       Source(s): Patient/Caregiver Call       Warfarin doses taken: Warfarin taken as instructed    Diet: No new diet changes identified    New illness, injury, or hospitalization: No    Medication/supplement changes: None noted    Signs or symptoms of bleeding or clotting: No    Previous INR: Subtherapeutic    Additional findings: None       PLAN     Recommended plan for no diet, medication or health factor changes affecting INR     Dosing Instructions: Increase your warfarin dose (7.1% change) with next INR in 1 week       Summary  As of 7/19/2022    Full warfarin instructions:  5 mg every Tue, Fri; 4 mg all other days   Next INR check:  7/26/2022             Telephone call with Bret who verbalizes understanding and agrees to plan and who agrees to plan and repeated back plan correctly. [employer] message sent too.    Patient using outside facility for labs    Education provided: None required    Plan made per [facility] anticoagulation protocol    Suni Choi, RN  Anticoagulation Clinic  7/19/2022    _______________________________________________________________________     Anticoagulation Episode Summary     Current INR goal:  2.0-3.0   TTR:  38.3 % (6.4 mo)   Target end date:  Indefinite   Send INR reminders to:  [facility]    Indications    Atrial fibrillation  unspecified type (H) [I48.91]           Comments:  Zay (p) [phone]  (f) [phone]         Anticoagulation Care Providers     Provider Role Specialty Phone number    Abiodun Woods MD Referring Pulmonary Disease [phone]

## 2022-07-20 NOTE — PROGRESS NOTES
This is a recent snapshot of the patient's [facility] medical record.  For current drug dose and complete information and questions, call [phone] or In Basket pool, [facility] home infusion ([id number])  CSN Number:  [id number]

## 2022-07-21 LAB
TACROLIMUS BLD-MCNC: 8 UG/L (ref 5–15)
TME LAST DOSE: NORMAL H
TME LAST DOSE: NORMAL H

## 2022-07-22 NOTE — RESULT ENCOUNTER NOTE
Tacrolimus level 8.0 at 12 hours, on 7/19/2022.  Goal 8-12.   Current dose 3 mg in AM, 2 mg in PM    Level at goal, no change in dose.   [employer] message sent

## 2022-07-26 ENCOUNTER — ANTICOAGULATION THERAPY VISIT (OUTPATIENT)
Dept: ANTICOAGULATION | Facility: CLINIC | Age: 57
End: 2022-07-26

## 2022-07-26 DIAGNOSIS — I48.91 ATRIAL FIBRILLATION, UNSPECIFIED TYPE (H): Primary | ICD-10-CM

## 2022-07-26 NOTE — PROGRESS NOTES
ANTICOAGULATION MANAGEMENT     Edson Thornton 57 year old male is on warfarin with subtherapeutic INR result. (Goal INR 2.0-3.0)    Recent labs: (last 7 days)     07/26/22  0805   INR 1.78*       ASSESSMENT       Source(s): Patient/Caregiver Call       Warfarin doses taken: Less warfarin taken than planned which may be affecting INR    Diet: No new diet changes identified    New illness, injury, or hospitalization: No    Medication/supplement changes: None noted    Signs or symptoms of bleeding or clotting: No    Previous INR: Subtherapeutic    Additional findings: None       PLAN     Recommended plan for temporary change(s) affecting INR     Dosing Instructions: will have patient take 5mg Tue, Fri and 4mg all other days. with next INR in 1 week       Summary  As of 7/26/2022    Full warfarin instructions:  5 mg every Tue, Fri; 4 mg all other days   Next INR check:  8/2/2022             Telephone call with Bret who verbalizes understanding and agrees to plan and who agrees to plan and repeated back plan correctly  Sent [employer] message with dosing and follow up instructions    Patient using outside facility for labs    Education provided: None required    Plan made per [facility] anticoagulation protocol    Suni Choi RN  Anticoagulation Clinic  7/26/2022    _______________________________________________________________________     Anticoagulation Episode Summary     Current INR goal:  2.0-3.0   TTR:  36.9 % (6.6 mo)   Target end date:  Indefinite   Send INR reminders to:  [facility]    Indications    Atrial fibrillation  unspecified type (H) [I48.91]           Comments:  Zay (p) [phone]  (f) [phone]         Anticoagulation Care Providers     Provider Role Specialty Phone number    Abiodun Woods MD Referring Pulmonary Disease [phone]

## 2022-07-27 NOTE — PROGRESS NOTES
Addendum 7/27/22 Patient calls back to report that he was actually taking the 5mg TF and 4mg all other days.  Patient did report he did miss a dose of warfarin last week. WKRAMIN

## 2022-08-02 ENCOUNTER — TELEPHONE (OUTPATIENT)
Dept: TRANSPLANT | Facility: CLINIC | Age: 57
End: 2022-08-02

## 2022-08-02 ENCOUNTER — ANTICOAGULATION THERAPY VISIT (OUTPATIENT)
Dept: ANTICOAGULATION | Facility: CLINIC | Age: 57
End: 2022-08-02

## 2022-08-02 DIAGNOSIS — I48.91 ATRIAL FIBRILLATION, UNSPECIFIED TYPE (H): Primary | ICD-10-CM

## 2022-08-02 DIAGNOSIS — Z94.2 S/P LUNG TRANSPLANT (H): Primary | ICD-10-CM

## 2022-08-02 LAB — INR (EXTERNAL): 2.7 (ref 0.9–1.1)

## 2022-08-02 RX ORDER — PREDNISONE 2.5 MG/1
2.5 TABLET ORAL EVERY EVENING
Qty: 30 TABLET | Refills: 11 | Status: SHIPPED | OUTPATIENT
Start: 2022-08-02 | End: 2023-08-08

## 2022-08-02 NOTE — PROGRESS NOTES
Addendum 8/4/22 Venous draw comes across today at 2.27.  No call made INR was addressed on 8/2 with POC draw. [facility]            ANTICOAGULATION MANAGEMENT     Edson Thornton 57 year old male is on warfarin with therapeutic INR result. (Goal INR 2.0-3.0)    Recent labs: (last 7 days)     08/02/22  0000   INR 2.7*       ASSESSMENT       Source(s): Patient/Caregiver Call       Warfarin doses taken: Warfarin taken as instructed    Diet: No new diet changes identified    New illness, injury, or hospitalization: No    Medication/supplement changes: None noted    Signs or symptoms of bleeding or clotting: No    Previous INR: Subtherapeutic    Additional findings: None       PLAN     Recommended plan for no diet, medication or health factor changes affecting INR     Dosing Instructions: Continue your current warfarin dose with next INR in 1 week       Summary  As of 8/2/2022    Full warfarin instructions:  5 mg every Tue, Fri; 4 mg all other days   Next INR check:  8/9/2022             Telephone call with Bret who verbalizes understanding and agrees to plan and who agrees to plan and repeated back plan correctly    Patient using outside facility for labs    Education provided: None required    Plan made per [facility] anticoagulation protocol    Suni Choi, RN  Anticoagulation Clinic  8/2/2022    _______________________________________________________________________     Anticoagulation Episode Summary     Current INR goal:  2.0-3.0   TTR:  38.3 % (6.8 mo)   Target end date:  Indefinite   Send INR reminders to:  [facility]    Indications    Atrial fibrillation  unspecified type (H) [I48.91]           Comments:  Zay (p) [phone]  (f) [phone]         Anticoagulation Care Providers     Provider Role Specialty Phone number    Abiodun Woods MD Referring Pulmonary Disease [phone]

## 2022-08-02 NOTE — TELEPHONE ENCOUNTER
Patient calling to see if you sent the prescription  predniSONE 2  MG tablet, he called Pharmacy and haven't received it yet double checking with you and would like a call back

## 2022-08-02 NOTE — TELEPHONE ENCOUNTER
Pt called asking that 2.5mg tablets be sent to [facility] pharmacy so he doesn't have to cut 5mg tabs in half. Script sent.

## 2022-08-09 ENCOUNTER — LAB (OUTPATIENT)
Dept: LAB | Facility: CLINIC | Age: 57
End: 2022-08-09

## 2022-08-09 ENCOUNTER — TELEPHONE (OUTPATIENT)
Dept: TRANSPLANT | Facility: CLINIC | Age: 57
End: 2022-08-09

## 2022-08-09 PROCEDURE — 80197 ASSAY OF TACROLIMUS: CPT | Performed by: INTERNAL MEDICINE

## 2022-08-09 NOTE — LETTER
PHYSICIAN ORDERS  Standing order good for 1 year, expires 2023     DATE & TIME ISSUED: 2022 11:11 AM  PATIENT NAME: Edson Thornton   : 1965     [facility]ly [facility] MR# [if applicable]: [id number]     DIAGNOSIS:  Lung Transplant  Z94.2  Please draw INR every 3 months or weekly as needed.       Any questions please call: Mady [phone]    Please fax these results to [phone].      .

## 2022-08-09 NOTE — TELEPHONE ENCOUNTER
Provider Call: General  Route to LPN    Reason for call: patient is at lab for INR draw. No orders were received. Lab is requesting a standing order for INR draws instead of single time orders.     Fax: [phone]    Call back needed? Yes    Return Call Needed  Same as documented in contacts section  When to return call?: Same day: Route [facility]

## 2022-08-10 ENCOUNTER — ANTICOAGULATION THERAPY VISIT (OUTPATIENT)
Dept: ANTICOAGULATION | Facility: CLINIC | Age: 57
End: 2022-08-10

## 2022-08-10 DIAGNOSIS — I48.91 ATRIAL FIBRILLATION, UNSPECIFIED TYPE (H): Primary | ICD-10-CM

## 2022-08-10 NOTE — PROGRESS NOTES
ANTICOAGULATION MANAGEMENT     Edson Thornton 57 year old male is on warfarin with therapeutic INR result. (Goal INR 2.0-3.0)    Recent labs: (last 7 days)     08/09/22  0926   INR 2.42*       ASSESSMENT       Source(s): Chart Review and Patient/Caregiver Call       Warfarin doses taken: Warfarin taken as instructed    Diet: No new diet changes identified    New illness, injury, or hospitalization: No    Medication/supplement changes: None noted    Signs or symptoms of bleeding or clotting: No    Previous INR: Therapeutic last visit; previously outside of goal range    Additional findings: None       PLAN     Recommended plan for no diet, medication or health factor changes affecting INR     Dosing Instructions: Continue your current warfarin dose with next INR in 1 week       Summary  As of 8/10/2022    Full warfarin instructions:  5 mg every Tue, Fri; 4 mg all other days   Next INR check:  8/16/2022             Telephone call with Bret who verbalizes understanding and agrees to plan and who agrees to plan and repeated back plan correctly    Patient using outside facility for labs    Education provided: Goal range and significance of current result    Plan made per [facility] anticoagulation protocol    Dionne Lopez RN  Anticoagulation Clinic  8/10/2022    _______________________________________________________________________     Anticoagulation Episode Summary     Current INR goal:  2.0-3.0   TTR:  40.2 % (7.1 mo)   Target end date:  Indefinite   Send INR reminders to:  [facility]    Indications    Atrial fibrillation  unspecified type (H) [I48.91]           Comments:  Zya (p) [phone]  (f) [phone]         Anticoagulation Care Providers     Provider Role Specialty Phone number    Abiodun Woods MD Referring Pulmonary Disease [phone]

## 2022-08-12 LAB
TACROLIMUS BLD-MCNC: 10.5 UG/L (ref 5–15)
TME LAST DOSE: NORMAL H
TME LAST DOSE: NORMAL H

## 2022-08-16 LAB
ACID FAST STAIN (ARUP): NORMAL

## 2022-08-17 ENCOUNTER — ANTICOAGULATION THERAPY VISIT (OUTPATIENT)
Dept: ANTICOAGULATION | Facility: CLINIC | Age: 57
End: 2022-08-17

## 2022-08-17 DIAGNOSIS — I48.91 ATRIAL FIBRILLATION, UNSPECIFIED TYPE (H): Primary | ICD-10-CM

## 2022-08-17 NOTE — PROGRESS NOTES
ANTICOAGULATION MANAGEMENT     Edson Thornton 57 year old male is on warfarin with therapeutic INR result. (Goal INR 2.0-3.0)    Recent labs: (last 7 days)     08/16/22  0932   INR 2.61*       ASSESSMENT       Source(s): Chart Review and Patient/Caregiver Call       Warfarin doses taken: Warfarin taken as instructed    Diet: No new diet changes identified    New illness, injury, or hospitalization: No    Medication/supplement changes: None noted    Signs or symptoms of bleeding or clotting: No    Previous INR: Therapeutic last 2(+) visits    Additional findings: None       PLAN     Recommended plan for no diet, medication or health factor changes affecting INR     Dosing Instructions: Continue your current warfarin dose with next INR in 1 week       Summary  As of 8/17/2022    Full warfarin instructions:  5 mg every Tue, Fri; 4 mg all other days   Next INR check:  8/24/2022             Telephone call with Bret who verbalizes understanding and agrees to plan    Patient using outside facility for labs    Education provided: Goal range and significance of current result    Plan made per [facility] anticoagulation protocol    Dionne Lopez RN  Anticoagulation Clinic  8/17/2022    _______________________________________________________________________     Anticoagulation Episode Summary     Current INR goal:  2.0-3.0   TTR:  42.2 % (7.3 mo)   Target end date:  Indefinite   Send INR reminders to:  [facility]    Indications    Atrial fibrillation  unspecified type (H) [I48.91]           Comments:  Zay (p) [phone]  (f) [phone]         Anticoagulation Care Providers     Provider Role Specialty Phone number    Abiodun Woods MD Referring Pulmonary Disease [phone]

## 2022-08-18 ENCOUNTER — TELEPHONE (OUTPATIENT)
Dept: TRANSPLANT | Facility: CLINIC | Age: 57
End: 2022-08-18

## 2022-08-18 LAB — BACTERIA BRONCH: ABNORMAL

## 2022-08-18 NOTE — TELEPHONE ENCOUNTER
Bret's grandaughter is pregnant; Boyfriend is a heart transplant recipient and taking MPA.   Bret is wondering if the paternal could carry any birth deficits from taking MPA?

## 2022-08-19 ENCOUNTER — OFFICE VISIT (OUTPATIENT)
Dept: OPHTHALMOLOGY | Facility: CLINIC | Age: 57
End: 2022-08-19
Attending: INTERNAL MEDICINE
Payer: COMMERCIAL

## 2022-08-19 DIAGNOSIS — H53.2 DOUBLE VISION: ICD-10-CM

## 2022-08-19 DIAGNOSIS — H53.10 SUBJECTIVE VISUAL DISTURBANCE: ICD-10-CM

## 2022-08-19 PROCEDURE — G0463 HOSPITAL OUTPT CLINIC VISIT: HCPCS | Mod: 25 | Performed by: TECHNICIAN/TECHNOLOGIST

## 2022-08-19 PROCEDURE — 92060 SENSORIMOTOR EXAMINATION: CPT | Performed by: OPHTHALMOLOGY

## 2022-08-19 PROCEDURE — 99213 OFFICE O/P EST LOW 20 MIN: CPT | Performed by: OPHTHALMOLOGY

## 2022-08-19 ASSESSMENT — REFRACTION_FINALRX
OD_VPRISM: 1.0
OS_VPRISM: 1.0

## 2022-08-19 ASSESSMENT — CONF VISUAL FIELD
OS_NORMAL: 1
OD_NORMAL: 1

## 2022-08-19 ASSESSMENT — TONOMETRY
IOP_METHOD: ICARE
OD_IOP_MMHG: 12
OS_IOP_MMHG: 13

## 2022-08-19 ASSESSMENT — REFRACTION_MANIFEST
OD_CYLINDER: +1.00
OS_AXIS: 053
OS_CYLINDER: +1.00
OS_ADD: +2.50
OD_SPHERE: -6.00
OD_ADD: +2.50
OD_AXIS: 130
OS_SPHERE: -5.75

## 2022-08-19 ASSESSMENT — REFRACTION_WEARINGRX
OS_CYLINDER: +0.75
OS_HBASE: IN
OD_HPRISM: 1
OD_AXIS: 145
OD_HBASE: OUT
OD_SPHERE: -6.00
OS_SPHERE: -5.75
OS_HPRISM: 1
OD_CYLINDER: +2.00
OS_AXIS: 057

## 2022-08-19 ASSESSMENT — SLIT LAMP EXAM - LIDS
COMMENTS: NORMAL
COMMENTS: NORMAL

## 2022-08-19 ASSESSMENT — VISUAL ACUITY
METHOD: SNELLEN - LINEAR
OS_CC: 20/20
OD_CC: 20/25
CORRECTION_TYPE: GLASSES

## 2022-08-19 ASSESSMENT — EXTERNAL EXAM - LEFT EYE: OS_EXAM: NORMAL

## 2022-08-19 ASSESSMENT — EXTERNAL EXAM - RIGHT EYE: OD_EXAM: NORMAL

## 2022-08-19 NOTE — PROGRESS NOTES
1.  Multiple embolic strokes associated with atrial fibrillation including cerebellar stroke and subsequent skew deviation.  Patient has received thorough stroke related care.  Today's follow-up is in regards to his double vision.  Patient has been seeing mostly single from his 2 prism diopter temporary prism correcting his small angle right hypertropia.  Today's measurements are essentially stable and we confirmed that his fusion is best facilitated with 2 prism diopters of right hypertropia correction.  Given his stability since last visit and his strabismus measurements I will prescribe him ground in prism glasses.  In the future as long as he is seeing single I recommend he keep the same prism correction as I do not expect this to change significantly over time.    2.  Left occipital lobe stroke attributable right inferior quadrantanopia defect.  Patient meets legal criteria to drive based with a noncommercial license upon [facility] visual fields on May 13, 2022.  Based upon his diplopia and residual field defect I do not believe he should be driving in a professional capacity using a 's license as I do not feel that that would be safe.    I did not make a follow-up appointment, but I would be happy to see the patient back in the future should any new neuro-ophthalmic concern arise.    Patient was sent for consultation by Dr. Elizondo for diplopia and visual defect.     HPI:  Patient has been on a 2 diopter base down prism since the last visit with Dr. Elizondo in May of this year.  Today he states that the double vision is pretty much gone when he is using the prism.  He has not noticed any diplopia when looking in any case.     Today he endorses that when he is having double vision without the prism the diplopia continues to be superimposed with 1 image slightly higher than the other.  The same in all gazes.  He endorses that the double vision has been constant since the onset after his bilateral  lung transplant.    He has a right inferior visual deficit where he states that it is constant.  He states that the deficits are washed out rather than in complete darkness.  This does not improve or worsen since the onset after the bilateral lung transplant.    The patient is presenting with a chronic illness with severe exacerbation or progression.    Independent historians:  Patient    Review of outside testing:  CT head without contrast on 2/1/2022:  IMPRESSION:  1.  No acute intracranial process.  2.  Chronic intracranial changes described above.    MRI brain with and without IV contrast 10/23/21  Impression:  Multifocal subacute infarcts within both cerebral hemispheres and left  cerebellum all of which appear present on prior head CT 10/22/2021.  Minimal petechial hemorrhage associated with the infarct in the left  occipital lobe.       Initial Strabismus test:      Review of outside clinical notes:  Bret was seen by Dr. Elizondo on May 13, 2022, at which point they were being evaluated for binocular diplopia with skew deviation likely secondary to scattered posterior circulation infarcts.  At the time he had a stick-on prism of 2 diopters base down.  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  He has had a negative MRI, and OCT RNFL showed no progression.    Past medical history:  HTN   Hyperlipidemia  Bilateral lung transplant  A fib on Coumadin + Lovenox  strokes  BPH    Medications:   acetaminophen  amLODIPine  calcium carbonate 600 mg-vitamin D 400 units  diclofenac  DULoxetine  enoxaparin ANTICOAGULANT  fluticasone  guaiFENesin-codeine  insulin glargine  insulin pen needle  levalbuterol  levothyroxine  Magnesium Glycinate Powd  Melatonin Tabs  metoprolol tartrate  multivitamin w/minerals  mycophenolic acid  nystatin  ondansetron  pantoprazole  predniSONE  rosuvastatin  senna-docusate  sulfamethoxazole-trimethoprim  tacrolimus  tamsulosin  warfarin  ANTICOAGULANT  zolpidem    Family history / social history:  retinal detachment in father, no glaucoma, no AMD.  DMII  HTN  Myocardia Infarctions in parents  CHF in mother    EtOH: none  Tobacco: no smoking  Drugs: no drugs    Exam:  Please see epic chart for complete exam     Tests ordered and interpreted today:  Sensorimotor testing showed full motility in both eyes.  The patient had a 2-3 prism diopter concomitant right hyperdeviation in all gaze positions that was well compensated for with 2 prism diopters right hypertropia correction.  Without prism he had constant diplopia in all gaze positions and with 3 prism diopters he was clearly overcorrected in all gaze positions.       25 minutes were spent on the date of the encounter by me doing chart review, history and exam, documentation, and further activities as noted above    Complete documentation of historical and exam elements from today's encounter can be found in the full encounter summary report (not reduplicated in this progress note).  I personally re-obtained the chief complaint(s) and history of present illness.  I confirmed and edited as necessary the review of systems, past medical/surgical history, family history, social history, and examination findings as documented by others; and I examined the patient myself.  I personally reviewed the relevant tests, images, and reports as documented above.  I formulated and edited as necessary the assessment and plan and discussed the findings and management plan with the patient and family     A medical student was involved in the care of the patient. I was present with the medical student who participated in the service and in the documentation of the note. I have  verified the history and personally performed the physical exam and medical decision making. I extensively reviewed and edited when necessary the assessment and plan. I agree with the assessment and plan of care as documented in the  note    MD BENSON Munoz, MS4

## 2022-08-19 NOTE — TELEPHONE ENCOUNTER
Patient connected with transplant pharmacist re: questions (chances of birth defects if father is on MPA).     Reminded patient that once great grandchild is born, need to be aware of when they get vaccines for he should be around people after getting live vaccines.

## 2022-08-23 ENCOUNTER — TELEPHONE (OUTPATIENT)
Dept: TRANSPLANT | Facility: CLINIC | Age: 57
End: 2022-08-23

## 2022-08-23 ENCOUNTER — ANTICOAGULATION THERAPY VISIT (OUTPATIENT)
Dept: ANTICOAGULATION | Facility: CLINIC | Age: 57
End: 2022-08-23

## 2022-08-23 DIAGNOSIS — I48.91 ATRIAL FIBRILLATION, UNSPECIFIED TYPE (H): Primary | ICD-10-CM

## 2022-08-23 NOTE — TELEPHONE ENCOUNTER
Messaged patient back via [employer] letting him know it is totally fine if he waits till his 9/12 appointment to get his Tac drawn.  Last draw was 8/8 with normal values.

## 2022-08-23 NOTE — PROGRESS NOTES
ANTICOAGULATION MANAGEMENT     Edson Thornton 57 year old male is on warfarin with therapeutic INR result. (Goal INR 2.0-3.0)    Recent labs: (last 7 days)     08/23/22  0938   INR 2.50*       ASSESSMENT       Source(s): Chart Review and Patient/Caregiver Call       Warfarin doses taken: Warfarin taken as instructed    Diet: No new diet changes identified    New illness, injury, or hospitalization: No    Medication/supplement changes: None noted    Signs or symptoms of bleeding or clotting: No    Previous INR: Therapeutic last 2(+) visits    Additional findings: Upcoming surgery/procedure 9/13/22.  Working with Usha on plan.  Bret had a bleed on Lovenox at last Bronch.  May need to hold more than 24 hours prior to procedure.       PLAN     Recommended plan for no diet, medication or health factor changes affecting INR     Dosing Instructions: Continue your current warfarin dose with next INR in 1 week       Summary  As of 8/23/2022    Full warfarin instructions:  5 mg every Tue, Fri; 4 mg all other days   Next INR check:  8/30/2022             Telephone call with Bret who agrees to plan and repeated back plan correctly    Patient using outside facility for labs    Education provided: Importance of notifying clinic for changes in medications; a sooner lab recheck maybe needed., Importance of notifying clinic for diarrhea, nausea/vomiting, reduced intake, and/or illness; a sooner lab recheck maybe needed., Importance of notifying clinic of upcoming surgeries and procedures 2 weeks in advance and Contact [phone] with any changes, questions or concerns.      Patient has Syringes (80mg Lovenox) in the home, no need to order.    Plan made per [facility] anticoagulation protocol    Nick Hunter, RN  Anticoagulation Clinic  8/23/2022    _______________________________________________________________________     Anticoagulation Episode Summary     Current INR goal:  2.0-3.0   TTR:  43.9 % (7.6 mo)   Target end date:   Indefinite   Send INR reminders to:  [facility]    Indications    Atrial fibrillation  unspecified type (H) [I48.91]           Comments:  Zay (p) [phone]  (f) [phone]         Anticoagulation Care Providers     Provider Role Specialty Phone number    Abiodun Woods MD Referring Pulmonary Disease [phone]

## 2022-08-23 NOTE — TELEPHONE ENCOUNTER
Continued Stay Review    Date:  18 ACUTE MED SURG LEVEL OF CARE    Vital Signs: /63   Pulse 104   Temp 97 7 °F (36 5 °C) (Oral)   Resp 18   Ht 5' 3" (1 6 m)   Wt 52 4 kg (115 lb 8 3 oz)   SpO2 96%   BMI 20 46 kg/m²      Vitals:   Temp (24hrs), Av °F (36 7 °C), Min:97 6 °F (36 4 °C), Max:98 5 °F (36 9 °C)   HR:  [84-99] 99  Resp:  [17-18] 18  BP: (117-166)/(64-77) 129/64  SpO2:  [94 %-99 %] 99 %  Body mass index is 20 62 kg/m²         Input and Output Summary (last 24 hours):   Last data filed at 18 1149    Gross per 24 hour   Intake              820 ml   Output             1021 ml   Net             -201 ml       Diet Dewey/CHO Controlled; Consistent Carbohydrate Diet Level 1 (4 carb servings/60 grams CHO/meal);  Sodium 4 GM (MEENAKSHI)    Dietary nutrition supplements Glucerna TID with Meals     Dietary nutrition supplements Gelatein BID with Meals        IV ACCESS    Medications:   Scheduled Meds:   Current Facility-Administered Medications:  acetaminophen 650 mg Oral Q8H [address] Harleen Kelley PA-C   apixaban 2 5 mg Oral BID Annette Valera MD   aspirin 81 mg Oral Daily Annette Valera MD   atorvastatin 40 mg Oral Daily With Markos Segura MD   calcitonin (salmon) 1 spray Alternating Nares Daily Annette Valera MD   diltiazem 120 mg Oral Daily Brenda Alamo MD   docusate sodium 100 mg Oral BID Jaya Bruno MD   famotidine 20 mg Oral Daily Mortimer Bayley, DO   furosemide 40 mg Oral Daily Yolanda Luna MD   insulin glargine 5 Units Subcutaneous HS Jaya Friday, MD   insulin lispro 1-6 Units Subcutaneous TID AC Annette Valera MD   lactulose 10 g Oral Daily PRN Mortimer Bayley, DO   metoprolol succinate 50 mg Oral Daily Jaya Bruno MD   ondansetron 4 mg Intravenous Q6H PRN Mortimer Bayley, DO   polyethylene glycol 17 g Oral Daily Jaya Bruno MD   potassium chloride 40 mEq Oral Once Angela Gomez PA-C   potassium chloride 40 mEq Oral Once Pt changing insurance and starts Sept 1st  Needs help with PA's for coverage  - [facility] Provider Portal    Nadya Sanchez PA-C   QUEtiapine 12 5 mg Oral HS Arie Mathews PA-C   senna-docusate sodium 1 tablet Oral HS Abel Garland MD       PRN Meds:      lactulose    ondansetron      LABS/Diagnostic Results:   Results from last 7 days  Lab Units 05/05/18  0449   WBC Thousand/uL 4 92   HEMOGLOBIN g/dL 12 8   HEMATOCRIT % 41 4   PLATELETS Thousands/uL 160         Results from last 7 days  Lab Units 05/05/18  0449   05/01/18  0508   SODIUM mmol/L 143  < > 142   POTASSIUM mmol/L 4 2  < > 3 1*   CHLORIDE mmol/L 104  < > 102   CO2 mmol/L 33*  < > 30   BUN mg/dL 47*  < > 56*   CREATININE mg/dL 1 27  < > 1 15   CALCIUM mg/dL 8 9  < > 9 6   TOTAL PROTEIN g/dL  --   --  6 8   BILIRUBIN TOTAL mg/dL  --   --  0 96   ALK PHOS U/L  --   --  89   ALT U/L  --   --  70   AST U/L  --   --  60*   GLUCOSE RANDOM mg/dL 109  < > 122      Results from last 7 days  Lab Units 05/07/18  1122 05/07/18  0632 05/06/18  2045 05/06/18  1531 05/06/18  1130 05/06/18  0623 05/05/18  2044 05/05/18  1527 05/05/18  1117 05/05/18  0649 05/04/18  2128 05/04/18  1630   POC GLUCOSE mg/dl 281* 132 113 229* 225* 75 250* 112 248* 115 167* 208*           Age/Sex: 80 y o  female       Assessment/Plan:        * Acute on chronic diastolic congestive heart failure (HCC)   Assessment & Plan     Echo - 4/30/17 - LVEF 44%, moderate diffuse hypokinesis, mild LVH, mildly dilated RV, mildly reduced RV function, moderately dilated LA & RA, moderate MR, mild-moderate aortic stenosis, FELTON 1 1  Possibly tachycardia induced because of AFib  Will try to wean off oxygen today  Continue PO lasix 40 mg daily  Metoprolol tartrate switched to succinate  Patient was again feeling short of breath this morning with crackles in her lower lobes  She required 1 dose of IV Lasix 20 mg  His symptoms improved after that    Will hold off on discharge today, continue to monitor today and depending on how she is doing tomorrow plan for possible discharge tomorrow             Atrial fibrillation with RVR (HCC)   Assessment & Plan     On BB and Cardizem  Rate is better controlled after starting Cardizem  AC w/ Elliquis  Continue to monitor  Cardiology following  Appreciate recommendations           Encephalopathy   Assessment & Plan     Most likely secondary to delirium  Multifactorial likely from CHF, Afib and specially underlying dementia  Patient slept well last night  She did not have any more episodes of agitation or confusion  This morning she was alert and oriented x3  Delirium appears to be resolved  Geriatrics following  Appreciate recommendations  Continue Seroquel 12 5 at bedtime  Continue to monitor          Aortic stenosis   Assessment & Plan     Stable - 12/2017 shows moderate AS  Cardiology following  Appreciate recommendations  Pt not a candidate of TAVR as per cardio          Type 2 myocardial infarction without ST elevation ([facility] [address] )   Assessment & Plan     Most likely due to CHF and afib with RVR  Pt denies any chest pain  Cont to monitor           Goals of care, counseling/discussion   Assessment & Plan     Patient and  refusing rehab/assisted living  Patient and  not able to take care of themselves at home  Their children her also not happy about this  Patient had very high risk of readmission if she does not take her medications properly as advised and follow diet  Palliative Care following  Appreciate help      Had a lengthy discussion with patient, , son and daughter-in-law at bedside on 5/2/2018  Patient and her  still very reluctant to leave the house that they are living in right now  I explained them the importance of taking medications regularly and if that does not happen she might have to be admitted to hospital again  Patient understood the risks and understood the importance of taking medications regularly    She is agreeable to home health care arranged      Patient was initially refusing to go to rehab but then she agreed after explaining current having lengthy discussion with her and her family     Patient was initially accepted at [facility] but discharge was held because of acute delirium  Plan was to discharge her today but again this morning patient had worsening shortness of breath requiring IV Lasix  So will hold off on discharge today             Moderate protein-calorie malnutrition (HCC)   Assessment & Plan     Supplements     Malnutrition Findings:   Malnutrition type: Chronic illness  Degree of Malnutrition: Malnutrition of moderate degree (malnutrition related to dementia vs  abdominal pain as evidenced by <75% energy intake for >1 month, mild depletion of orbital body fat, and mild depletion of muscle mass seen at clavicle to be treated with liberalized diet and nutrition supplements )     BMI Findings: Body mass index is 20 62 kg/m²               CAD (coronary artery disease)   Assessment & Plan     Stable  Continue ASA, statin and BB          Controlled diabetes mellitus type II without complication (HCC)   Assessment & Plan     A1c 7 4%  Cont Lantus to 5 units  Continue low-dose sliding scale  Blood glucose monitoring  Carb controlled diet              VTE Pharmacologic Prophylaxis:   Pharmacologic: Apixaban (Eliquis)  Mechanical VTE Prophylaxis in Place: Yes    Current Length of Stay: 8 day(s)  Current Patient Status: Inpatient           Discharge Plan:   ANTICIPATE DISCHARGE TO INPATIENT REHAB WHEN MEDICALLY CLEARED    PER PT TODAY 5/7/18  Plan   Treatment/Interventions Functional transfer training;LE strengthening/ROM; Therapeutic exercise; Endurance training;Patient/family training;Equipment eval/education; Bed mobility;Gait training   Progress Progressing toward goals   PT Frequency 5x/wk   Recommendation   Recommendation Post acute IP rehab     CASE MANAGEMENT FOLLOWING CLOSELY FOR ALL DISCHARGE NEEDS

## 2022-08-23 NOTE — TELEPHONE ENCOUNTER
Pt changing insurance September 1st to [facility] insurance.     [place] not in network for him, reports he will need PA's or all visits when he comes to see us.     Message sent to Gerardo Sood.     Per Gerardo: : I'll have to call [place] on 9/1 when i have his policy info and see if he will need a referral from his primary physician or how they want to go about it. Pt will need to work with his primary physician or in State pulmonologist to make sure they help get referrals on file with his insurance.

## 2022-08-29 ENCOUNTER — TELEPHONE (OUTPATIENT)
Dept: ANTICOAGULATION | Facility: CLINIC | Age: 57
End: 2022-08-29

## 2022-08-29 DIAGNOSIS — I48.91 ATRIAL FIBRILLATION, UNSPECIFIED TYPE (H): Primary | ICD-10-CM

## 2022-08-29 NOTE — TELEPHONE ENCOUNTER
MATHEUS-PROCEDURAL ANTICOAGULATION  MANAGEMENT    ASSESSMENT     Warfarin interruption plan for Bronch with Biopsies on 9/13/22.     Indication for Anticoagulation: Atrial Fibrillation       [id number] = 4 (Hypertension, Diabetes and Stroke-10/2021)       DVT right subclavian while hospitalized (11/4/21)       Recurrent GIB (10-11/2021)       Bridged 6/2022 (post procedure bronchial hemorrhage prior to resuming enoxaparin) and 4/2022 for bronch        Matheus-Procedure Risk stratification for thromboembolism: moderate (2017 ACC periprocedure pathway for NVAF Expert Consensus)     NVAF: 2017 ACC periprocedure pathway for NVAF advises likely bridge for moderate risk stratification with a hx of stroke, TIA or systemic embolism     RECOMMENDATION       Pre-Procedure:  o Hold warfarin for 5 days, until after procedure starting: Thursday, September 8   o Enoxaparin (Lovenox) 50 mg subq Q 12 hrs (0.75 mg/kg Q 12 hrs for CrCl 30-60 ml/min per [facility] P&T approved dose adjustment protocol)   - Start enoxaparin: Saturday, September 10 in AM  - Last dose of enoxaparin prior to procedure: Monday, September 12 in AM  (~24 hours prior to procedure)  - Check LMWH anit-xa on Monday, September 12; 4-6 hours after last injection      Post-Procedure:  o Resume warfarin dose if okay with provider doing procedure on night of procedure, Tuesday, September 13 PM  o Resume enoxaparin (Lovenox) ~ 48 hrs post procedure when okay with provider doing procedure. Continue until INR >= 2.0 x 1  o Recheck INR 5-7 days after resuming warfarin   ?     Plan routed to referring provider for approval  ?   Usha Singh [facility]    SUBJECTIVE/OBJECTIVE     Edson Thornton, a 57 year old male    Goal INR Range: 2.0-3.0     Patient bridged in past: Yes      Wt Readings from Last 3 Encounters:   06/29/22 72.3 kg (159 lb 8 oz)   06/22/22 69.8 kg (153 lb 14.4 oz)   06/20/22 69.9 kg (154 lb)      Ideal body weight: 59.2 kg (130 lb 8.2 oz)  Adjusted ideal  "body weight: 64.5 kg (142 lb 1.7 oz)     Estimated body mass index is 27.38 kg/m  as calculated from the following:    Height as of 6/21/22: 1.626 m (5' 4\").    Weight as of 6/29/22: 72.3 kg (159 lb 8 oz).    Lab Results   Component Value Date    INR 2.50 (H) 08/23/2022    INR 2.61 (H) 08/16/2022    INR 2.42 (H) 08/09/2022     Lab Results   Component Value Date    HGB 10.3 06/29/2022    HCT 33.3 06/29/2022     06/29/2022     Lab Results   Component Value Date    CR 1.24 06/29/2022    CR 1.24 (H) 06/21/2022    CR 1.46 (H) 06/20/2022     CrCl = 60 ml/min  "

## 2022-08-30 ENCOUNTER — ANTICOAGULATION THERAPY VISIT (OUTPATIENT)
Dept: ANTICOAGULATION | Facility: CLINIC | Age: 57
End: 2022-08-30

## 2022-08-30 DIAGNOSIS — I48.91 ATRIAL FIBRILLATION, UNSPECIFIED TYPE (H): Primary | ICD-10-CM

## 2022-08-30 NOTE — PROGRESS NOTES
ANTICOAGULATION MANAGEMENT     Edson Thornton 57 year old male is on warfarin with therapeutic INR result. (Goal INR 2.0-3.0)    Recent labs: (last 7 days)     08/30/22  0836   INR 2.78*       ASSESSMENT       Source(s): Chart Review and Patient/Caregiver Call       Warfarin doses taken: Warfarin taken as instructed    Diet: No new diet changes identified    New illness, injury, or hospitalization: No    Medication/supplement changes: None noted    Signs or symptoms of bleeding or clotting: No    Previous INR: Therapeutic last 2(+) visits    Additional findings: Patient is scheduled for a bronch on 9/13.  Plan was sent on 8/29 for provider approval.  Once plan is approved will call and instruct patient.        PLAN     Recommended plan for no diet, medication or health factor changes affecting INR     Dosing Instructions: Continue your current warfarin dose with next INR in 1 week       Summary  As of 8/30/2022    Full warfarin instructions:  9/8: Hold; 9/9: Hold; 9/10: Hold; 9/11: Hold; 9/12: Hold; Otherwise 5 mg every Tue, Fri; 4 mg all other days   Next INR check:  9/6/2022             Telephone call with [place] who verbalizes understanding and agrees to plan and who agrees to plan and repeated back plan correctly    Patient using outside facility for labs    Education provided: Goal range and significance of current result and Importance of therapeutic range    Plan made per [facility] anticoagulation protocol    Terrie Cedillo RN  Anticoagulation Clinic  8/30/2022    _______________________________________________________________________     Anticoagulation Episode Summary     Current INR goal:  2.0-3.0   TTR:  45.6 % (7.8 mo)   Target end date:  Indefinite   Send INR reminders to:  [facility]    Indications    Atrial fibrillation  unspecified type (H) [I48.91]           Comments:  Zay (p) [phone]  (f) [phone]         Anticoagulation Care Providers     Provider Role Specialty Phone number     Abiodun Woods MD Referring Pulmonary Disease [phone]

## 2022-08-31 ENCOUNTER — MYC MEDICAL ADVICE (OUTPATIENT)
Dept: ANTICOAGULATION | Facility: CLINIC | Age: 57
End: 2022-08-31

## 2022-08-31 ENCOUNTER — TELEPHONE (OUTPATIENT)
Dept: ANTICOAGULATION | Facility: CLINIC | Age: 57
End: 2022-08-31

## 2022-08-31 NOTE — TELEPHONE ENCOUNTER
Upcoming bronch with biopsy 9/13 with Warfarin hold and Enoxaparin bridging. Enoxaparin dosing decreased due to bleeding with last bronch-[facility]WH AntiXa level recommended while taking Enoxaparin.     Bret has MD labs, imaging and testing Sept 12th. Additional lab time required for AntiXa level due to administration time of Enoxaparin injections-must be done 4 to 6 hours after the injection.    Sept 11th, Enoxaparin injection at 7:00pm (instead of 8:00pm)    Sept 12th Enoxaparin Injection at 6:00AM (instead of 8:00am) Labs at 10:45AM (timed lab-4 to 6 hrs from Enoxaparin injection.     Bret confirmed correct Enoxaparin dosing, 50 mg (using 80 mg pre-filled syringes-has medication in home already).    [employer] message sent with update.     ESSIE COLÓN RN-BC, ACC  Anticoagulation Clinic  [phone]

## 2022-08-31 NOTE — TELEPHONE ENCOUNTER
Plan approved as written.     Mady Marin RN Frantzen, Leah, [facility]; Abiodun Woods MD  Hi Usha,     I reviewed the plan with Dr. Woods and he agrees with it.     Let me know if there are any other questions!     Thanks,   Mady PETERS. Dr. Woods's box is sometimes a black hole. If you don't hear back from him within a day, please message me. Thanks!             Previous Messages       ----- Message -----   From: Usha Singh [facility]   Sent: 8/29/2022   6:18 PM CDT   To: Mady Marin RN   Subject: FW: Bronch 9/13                                   Quinten Earl,     I was asked to review [place]'s chart for his upcoming bronch with biopsy planned for 9/13 and address the plan for enoxaparin. In reviewing the details of the June 2022 plan, the dose of enoxaparin instructed was likely too high for his renal function at that time. I have outlined a plan for a modified dose of enoxaparin (0.75 mg/kg or 50 mg per dose rather than the 75mg or 1 mg/kg that he used in June). Additionally, I am planning to get a low molecular anit-xa level on 9/12.     I have routed a plan to Dr. Woods for approval. I can keep you updated on the conversation.     Usha       ----- Message -----   From: Nick Hunter RN   Sent: 8/29/2022   6:00 AM CDT   To: Usha Singh RPH   Subject: FW: Bronch 9/13                                     ----- Message -----   From: Nick Hunter RN   Sent: 8/24/2022   6:00 AM CDT   To: Nick Hunter RN   Subject: FW: Bronch 9/13                                     ----- Message -----   From: Nick Hunter RN   Sent: 8/23/2022   6:00 AM CDT   To: Nick Hunter RN   Subject: FW: Bronch 9/13                                     ----- Message -----   From: Nick Hunter RN   Sent: 8/19/2022   6:00 AM CDT   To: Nick Hunter RN   Subject: FW: Bronch 9/13                                     ----- Message -----   From: Mady Marin RN   Sent: 8/16/2022   9:18 AM CDT   To: [facility]    Subject: Bronch 9/13                                       Bret Shipley is on coumadin and has a bronchoscopy 9/13. He will need to be bridged to Lovenox.     Bret has had a history of bleeding post bronchoscopy, he was admitted for observation after his last bronch.     Wondering if we can hold Lovenox for an additional dose/day before his next bronch?     Let me know if there are any questions.     Thanks!   Mady

## 2022-09-06 ENCOUNTER — TELEPHONE (OUTPATIENT)
Dept: TRANSPLANT | Facility: CLINIC | Age: 57
End: 2022-09-06

## 2022-09-06 NOTE — TELEPHONE ENCOUNTER
Calling patient back from earlier today and Left voicemail to return call.     ADDENDUM: patient calls office back and states that he is changing insurances from [facility] to [place] insurance and needs to get pre-authorized for his appointments for them to be in network as [facility] is not considered in-network. Reached out to Gerardo Sood for recommendations.

## 2022-09-07 ENCOUNTER — ANTICOAGULATION THERAPY VISIT (OUTPATIENT)
Dept: ANTICOAGULATION | Facility: CLINIC | Age: 57
End: 2022-09-07

## 2022-09-07 DIAGNOSIS — I48.91 ATRIAL FIBRILLATION, UNSPECIFIED TYPE (H): Primary | ICD-10-CM

## 2022-09-07 NOTE — PROGRESS NOTES
ANTICOAGULATION MANAGEMENT     Edson Thornton 57 year old male is on warfarin with therapeutic INR result. (Goal INR 2.0-3.0)    Sept 11th, Enoxaparin injection at 7:00pm (instead of 8:00pm)     Sept 12th Enoxaparin Injection at 6:00AM (instead of 8:00am) Labs at 10:45AM (timed lab-4 to 6 hrs from Enoxaparin injection.       Recent labs: (last 7 days)     09/06/22  0851   INR 2.35*       ASSESSMENT       Source(s): Chart Review and Patient/Caregiver Call       Warfarin doses taken: Warfarin taken as instructed and Start holding tomorrow 9/8 for upcoming bronchoscopy    Diet: No new diet changes identified    New illness, injury, or hospitalization: No    Medication/supplement changes: None noted    Signs or symptoms of bleeding or clotting: No    Previous INR: Therapeutic last 2(+) visits    Additional findings: Upcoming surgery/procedure 9/13/22.  See My chart message for instructions. Usha will have us test an Anti Xa on 9/12. Bret repeated back instructions above on injections being pushed back to have an accurate Anti Xa.       PLAN     Recommended plan for temporary change(s) affecting INR     Dosing Instructions: 4mg of Warfarin 9/7, then HOLD for 5 days pre-procedure.  Bridging with Lovenox with next INR in 5 days       Summary  As of 9/7/2022    Full warfarin instructions:  9/8: Hold; 9/9: Hold; 9/10: Hold; 9/11: Hold; 9/12: Hold; Otherwise 5 mg every Tue, Fri; 4 mg all other days   Next INR check:  9/12/2022             Telephone call with Bret who verbalizes understanding and agrees to plan and who agrees to plan and repeated back plan correctly    Lab visit scheduled    Education provided: Lovenox/Heparin education provided: prescribed dose and frequency, recommended injection site and site rotation and adjusting syringe/pushing out medication to obtain prescribed dose  and Written instructions provided    Plan made per [facility] anticoagulation protocol    Nick Hunter, RN  Anticoagulation  Clinic  9/7/2022    _______________________________________________________________________     Anticoagulation Episode Summary     Current INR goal:  2.0-3.0   TTR:  47.1 % (8 mo)   Target end date:  Indefinite   Send INR reminders to:  [facility]    Indications    Atrial fibrillation  unspecified type (H) [I48.91]           Comments:  Zay (p) [phone]  (f) [phone]         Anticoagulation Care Providers     Provider Role Specialty Phone number    Abiodun Woods MD Referring Pulmonary Disease [phone]

## 2022-09-09 DIAGNOSIS — Z00.6 EXAMINATION OF PARTICIPANT OR CONTROL IN CLINICAL RESEARCH: Primary | ICD-10-CM

## 2022-09-11 NOTE — PROGRESS NOTES
"Transplant Coordinator Note    Reason for visit: Post lung transplant follow up visit   Coordinator: Present   Caregiver:      Health concerns addressed today:  1. Respiratory - feeling well. No illnesses since last visit. Breathing comfortable when sitting. Walking 4 blocks most days of the week - get a little winded, recovers quickly, better since last visit. No cough, no sputum.    2. Night sweats - chronic  3.  GI: appetite \"okay\". No issues with nausea, vomiting, abdominal pain.   4. Ophthalmology - prism classes  5. Continues to have stiff joints - no change. Will consider additional meds if worse.     Activity/rehab: up ad [place], walking 4 blocks most days of the week  Oxygen needs: room air  Pain management/RX: joint/bone pain (wrist and fingers), sees rheumatology next week. Some swelling. Using Volteran Gel.   Diabetic management: on lantus, occasional novolog  Next Bronch due: 9/13/20222  CMV status: D-/R-  EBV status: D+/R+  AC/asa: on coumadin, bridged to Lovenox for bronch (lower dose per CrCl d/t bleeding post bronch x 2)  PJP prophylactic: Bactrim     COVID:  1. COVID-19 infection (yes/no, date of most recent positive test): no  2. Status/instructions given about COVID-19 vaccine: Has received full dose of Evusheld 1/7/2022 and 4/18/22. next Evusheld dose due 10/18/2022.     Pt Education: medications (use/dose/side effects), how/when to call coordinator, frequency of labs, s/s of infection/rejection, call prior to starting any new medications, lab/vital sign book    Health Maintenance:     Last colonoscopy:     Next colonoscopy due:     Dermatology: seeing locally in November    Vaccinations this visit:     Labs, CXR, PFTs reviewed with patient  Medication record reviewed and reconciled  Questions and concerns addressed    Patient Instructions  1. Continue to hydrate with 60-70 oz fluids daily.  2. Continue to exercise daily or most days of the week.  3. Follow up with your primary care provider for " annual gender health maintenance procedures.  4. Follow up with colonoscopy schedule.  5. Follow up with annual dermatology visits.  6. It doesn't seem like the COVID vaccine is working well in lung transplant patients. A number of lung transplant patients have gotten sick with COVID even after receiving the vaccines.  Based on our recent experience, it can be life-threatening to get COVID  even after being vaccinated. Please continue to act like you did not get the COVID vaccine - social distancing, wearing a mask, good hand hygiene, etc. If the people around you are vaccinated, it will help reduce the risk of you getting COVID. All members of your household should be vaccinated.  7. Your next Evusheld dose is due 10/18/2022. You can see if you can get it locally. If you do, let Mady know.   8. You can stop taking Nystatin swish and swallow.   9. We are going to cancel our 9/13 bronchoscopy.   10. We are going to switch you from Metoprolol to Propranolol 40mg twice a day. Do not take metoprolol and propranolol at the same time. Keep an eye on your blood pressure and heart rate and let Mady know what they're running.     Next transplant clinic appointment: 2 months with CXR, DEXA scan, labs 6 minute walk test, and full PFTs  Next lab draw: per tacrolimus level, otherwise monthly for transplant labs.      AVS printed at time of check out

## 2022-09-12 ENCOUNTER — LAB (OUTPATIENT)
Dept: LAB | Facility: CLINIC | Age: 57
End: 2022-09-12

## 2022-09-12 ENCOUNTER — ANTICOAGULATION THERAPY VISIT (OUTPATIENT)
Dept: ANTICOAGULATION | Facility: CLINIC | Age: 57
End: 2022-09-12

## 2022-09-12 ENCOUNTER — OFFICE VISIT (OUTPATIENT)
Dept: TRANSPLANT | Facility: CLINIC | Age: 57
End: 2022-09-12
Attending: INTERNAL MEDICINE
Payer: COMMERCIAL

## 2022-09-12 ENCOUNTER — ANCILLARY PROCEDURE (OUTPATIENT)
Dept: GENERAL RADIOLOGY | Facility: CLINIC | Age: 57
End: 2022-09-12
Attending: INTERNAL MEDICINE
Payer: COMMERCIAL

## 2022-09-12 VITALS
OXYGEN SATURATION: 95 % | HEART RATE: 71 BPM | TEMPERATURE: 98.2 F | BODY MASS INDEX: 28.32 KG/M2 | WEIGHT: 165 LBS | DIASTOLIC BLOOD PRESSURE: 83 MMHG | SYSTOLIC BLOOD PRESSURE: 136 MMHG

## 2022-09-12 DIAGNOSIS — D84.9 IMMUNOSUPPRESSED STATUS (H): ICD-10-CM

## 2022-09-12 DIAGNOSIS — Z94.2 S/P LUNG TRANSPLANT (H): ICD-10-CM

## 2022-09-12 DIAGNOSIS — G25.1 TREMOR DUE TO MULTIPLE DRUGS: ICD-10-CM

## 2022-09-12 DIAGNOSIS — I10 PRIMARY HYPERTENSION: ICD-10-CM

## 2022-09-12 DIAGNOSIS — Z94.2 S/P LUNG TRANSPLANT (H): Primary | ICD-10-CM

## 2022-09-12 DIAGNOSIS — R79.1 ELEVATED INR: ICD-10-CM

## 2022-09-12 DIAGNOSIS — E83.42 HYPOMAGNESEMIA: ICD-10-CM

## 2022-09-12 DIAGNOSIS — N18.31 CHRONIC KIDNEY DISEASE, STAGE 3A (H): ICD-10-CM

## 2022-09-12 DIAGNOSIS — K59.01 SLOW TRANSIT CONSTIPATION: ICD-10-CM

## 2022-09-12 DIAGNOSIS — I48.91 ATRIAL FIBRILLATION, UNSPECIFIED TYPE (H): Primary | ICD-10-CM

## 2022-09-12 DIAGNOSIS — Z00.6 EXAMINATION OF PARTICIPANT OR CONTROL IN CLINICAL RESEARCH: ICD-10-CM

## 2022-09-12 DIAGNOSIS — Z79.899 ENCOUNTER FOR LONG-TERM (CURRENT) USE OF HIGH-RISK MEDICATION: Primary | ICD-10-CM

## 2022-09-12 DIAGNOSIS — I48.91 ATRIAL FIBRILLATION, UNSPECIFIED TYPE (H): ICD-10-CM

## 2022-09-12 DIAGNOSIS — Z20.822 ENCOUNTER FOR LABORATORY TESTING FOR COVID-19 VIRUS: ICD-10-CM

## 2022-09-12 DIAGNOSIS — Z79.899 ENCOUNTER FOR LONG-TERM (CURRENT) USE OF HIGH-RISK MEDICATION: ICD-10-CM

## 2022-09-12 LAB
ANION GAP SERPL CALCULATED.3IONS-SCNC: 13 MMOL/L (ref 7–15)
BUN SERPL-MCNC: 22 MG/DL (ref 6–20)
CALCIUM SERPL-MCNC: 9.4 MG/DL (ref 8.6–10)
CHLORIDE SERPL-SCNC: 105 MMOL/L (ref 98–107)
CMV DNA SPEC NAA+PROBE-ACNC: NOT DETECTED IU/ML
CREAT SERPL-MCNC: 1.22 MG/DL (ref 0.67–1.17)
DEPRECATED HCO3 PLAS-SCNC: 23 MMOL/L (ref 22–29)
ERYTHROCYTE [DISTWIDTH] IN BLOOD BY AUTOMATED COUNT: 14.6 % (ref 10–15)
EXPTIME-PRE: 6.96 SEC
FEF2575-%PRED-PRE: 25 %
FEF2575-PRE: 0.7 L/SEC
FEF2575-PRED: 2.78 L/SEC
FEFMAX-%PRED-PRE: 56 %
FEFMAX-PRE: 4.69 L/SEC
FEFMAX-PRED: 8.3 L/SEC
FEV1-%PRED-PRE: 45 %
FEV1-PRE: 1.42 L
FEV1FEV6-PRE: 63 %
FEV1FEV6-PRED: 79 %
FEV1FVC-PRE: 62 %
FEV1FVC-PRED: 79 %
FIFMAX-PRE: 4.34 L/SEC
FVC-%PRED-PRE: 57 %
FVC-PRE: 2.27 L
FVC-PRED: 3.95 L
GFR SERPL CREATININE-BSD FRML MDRD: 69 ML/MIN/1.73M2
GLUCOSE SERPL-MCNC: 111 MG/DL (ref 70–99)
HCT VFR BLD AUTO: 35.1 % (ref 40–53)
HGB BLD-MCNC: 11 G/DL (ref 13.3–17.7)
HOLD SPECIMEN: NORMAL
HOLD SPECIMEN: NORMAL
INR PPP: 1.16 (ref 0.85–1.15)
LMWH PPP CHRO-ACNC: 0.96 IU/ML
LMWH PPP CHRO-ACNC: NORMAL
MAGNESIUM SERPL-MCNC: 1.7 MG/DL (ref 1.7–2.3)
MCH RBC QN AUTO: 25.6 PG (ref 26.5–33)
MCHC RBC AUTO-ENTMCNC: 31.3 G/DL (ref 31.5–36.5)
MCV RBC AUTO: 82 FL (ref 78–100)
PLATELET # BLD AUTO: 217 10E3/UL (ref 150–450)
POTASSIUM SERPL-SCNC: 4.1 MMOL/L (ref 3.4–5.3)
RBC # BLD AUTO: 4.29 10E6/UL (ref 4.4–5.9)
SARS-COV-2 RNA RESP QL NAA+PROBE: NEGATIVE
SODIUM SERPL-SCNC: 141 MMOL/L (ref 136–145)
WBC # BLD AUTO: 7.8 10E3/UL (ref 4–11)

## 2022-09-12 PROCEDURE — 85520 HEPARIN ASSAY: CPT | Performed by: PATHOLOGY

## 2022-09-12 PROCEDURE — 83735 ASSAY OF MAGNESIUM: CPT | Performed by: PATHOLOGY

## 2022-09-12 PROCEDURE — 99000 SPECIMEN HANDLING OFFICE-LAB: CPT | Performed by: PATHOLOGY

## 2022-09-12 PROCEDURE — 85027 COMPLETE CBC AUTOMATED: CPT | Performed by: PATHOLOGY

## 2022-09-12 PROCEDURE — 80048 BASIC METABOLIC PNL TOTAL CA: CPT | Performed by: PATHOLOGY

## 2022-09-12 PROCEDURE — U0003 INFECTIOUS AGENT DETECTION BY NUCLEIC ACID (DNA OR RNA); SEVERE ACUTE RESPIRATORY SYNDROME CORONAVIRUS 2 (SARS-COV-2) (CORONAVIRUS DISEASE [COVID-19]), AMPLIFIED PROBE TECHNIQUE, MAKING USE OF HIGH THROUGHPUT TECHNOLOGIES AS DESCRIBED BY CMS-2020-01-R: HCPCS | Performed by: PATHOLOGY

## 2022-09-12 PROCEDURE — 87799 DETECT AGENT NOS DNA QUANT: CPT | Performed by: PATHOLOGY

## 2022-09-12 PROCEDURE — 80197 ASSAY OF TACROLIMUS: CPT | Mod: 90 | Performed by: PATHOLOGY

## 2022-09-12 PROCEDURE — 86833 HLA CLASS II HIGH DEFIN QUAL: CPT | Performed by: PATHOLOGY

## 2022-09-12 PROCEDURE — 85610 PROTHROMBIN TIME: CPT | Performed by: PATHOLOGY

## 2022-09-12 PROCEDURE — 99207 PR RESPIRATORY FLOW VOLUME LOOP: CPT

## 2022-09-12 PROCEDURE — 71046 X-RAY EXAM CHEST 2 VIEWS: CPT | Mod: GC | Performed by: RADIOLOGY

## 2022-09-12 PROCEDURE — 36415 COLL VENOUS BLD VENIPUNCTURE: CPT | Performed by: PATHOLOGY

## 2022-09-12 PROCEDURE — 86832 HLA CLASS I HIGH DEFIN QUAL: CPT | Performed by: PATHOLOGY

## 2022-09-12 PROCEDURE — U0005 INFEC AGEN DETEC AMPLI PROBE: HCPCS | Performed by: PATHOLOGY

## 2022-09-12 RX ORDER — AMOXICILLIN 250 MG
2 CAPSULE ORAL 2 TIMES DAILY PRN
Qty: 120 TABLET | Refills: 11 | COMMUNITY
Start: 2022-09-12

## 2022-09-12 RX ORDER — PROPRANOLOL HYDROCHLORIDE 20 MG/1
40 TABLET ORAL 2 TIMES DAILY
Qty: 120 TABLET | Refills: 11 | Status: SHIPPED | OUTPATIENT
Start: 2022-09-12 | End: 2023-09-20

## 2022-09-12 RX ORDER — ROPINIROLE 0.25 MG/1
0.25 TABLET, FILM COATED ORAL AT BEDTIME
COMMUNITY

## 2022-09-12 RX ORDER — DULOXETIN HYDROCHLORIDE 30 MG/1
90 CAPSULE, DELAYED RELEASE ORAL DAILY
COMMUNITY
Start: 2022-09-12 | End: 2022-11-14

## 2022-09-12 ASSESSMENT — PAIN SCALES - GENERAL: PAINLEVEL: NO PAIN (0)

## 2022-09-12 NOTE — NURSING NOTE
Chief Complaint   Patient presents with     RECHECK     Return visit, no new concerns.       Blood pressure 136/83, pulse 71, temperature 98.2  F (36.8  C), weight 74.8 kg (165 lb), SpO2 95 %.    ROMEL ACEVEDO

## 2022-09-12 NOTE — PROGRESS NOTES
ANTICOAGULATION MANAGEMENT     Edson Thornton 57 year old male is on warfarin with subtherapeutic INR result. (Goal INR 2.0-3.0)    Recent labs: (last 7 days)     09/12/22  0852   INR 1.16*       ASSESSMENT       Source(s): Chart Review and Patient/Caregiver Call       Warfarin doses taken: Held for bronchoscopy on 9/13/22(which was cancelled)  recently which may be affecting INR    Diet: No new diet changes identified    New illness, injury, or hospitalization: No    Medication/supplement changes: 9/12/22 stopped metoprolol and will start propranolol    Signs or symptoms of bleeding or clotting: No    Previous INR: Therapeutic last 2(+) visits    Additional findings: Bridging with Enoxaparin until INR >= 2.0.  Lovenox dose is changed to 40 mg Q 12 hours today--see note below.      Order will be sent to [facility] for Bret to have LMWH anti Xa drawn on 9/16/22 after the order is signed by Dr. Woods.         PLAN     Recommended plan for temporary change(s) affecting INR     Dosing Instructions: booster dose then continue your current warfarin dose with next INR in 4 days       Summary  As of 9/12/2022    Full warfarin instructions:  9/12: 8 mg; Otherwise 5 mg every Tue, Fri; 4 mg all other days   Next INR check:  9/16/2022             Telephone call with Bret who agrees to plan and repeated back plan correctly    Patient using outside facility for labs    Education provided: Please call back if any changes to your diet, medications or how you've been taking warfarin, Monitoring for bleeding signs and symptoms and Contact [phone] with any changes, questions or concerns.     Plan made with [facility] Pharmacist Usha Chau, RN  Anticoagulation Clinic  9/12/2022    _______________________________________________________________________     Anticoagulation Episode Summary     Current INR goal:  2.0-3.0   TTR:  46.7 % (8.2 mo)   Target end date:  Indefinite   Send INR reminders to:  [facility]    Indications    Atrial fibrillation  unspecified type (H) [I48.91]           Comments:  Zay (p) [phone]  (f) [phone]         Anticoagulation Care Providers     Provider Role Specialty Phone number    Abiodun Woods MD Referring Pulmonary Disease [phone]

## 2022-09-12 NOTE — PROGRESS NOTES
ANTICOAGULATION MANAGEMENT     Edson Thornton, 57 year old male on Enoxaparin    Current dosin mg subcutaneous q12h  Administration times: last dose at 0600 this morning  Supplies: 80 mg pre-filled enoxaparin syringe     Missed doses in last 72 hours: No    Signs or symptoms of bleeding or clotting: No    Goal: 0.6-1 international unit(s)/ml   Lab draw done 4 to 6 hours after last injection: No; level drawn early. Suspect true peak level if done 4 to 6 hours after last injection would have been > 1    Recent labs: (last 7 days)     22  0852   ALMWH 0.96       Lab Results   Component Value Date    CR 1.2022       Wt Readings from Last 3 Encounters:   22 74.8 kg (165 lb)   22 72.3 kg (159 lb 8 oz)   22 69.8 kg (153 lb 14.4 oz)         PLAN:    Dosing instructions: Change dose to: 40 mg SQ Q12h based on extrapolation of expected peak anti-Xa value      Usha Singh [facility]  Anticoagulation Clinic   [phone]

## 2022-09-12 NOTE — PROGRESS NOTES
Reason for Visit  Edson Thornton is a 57 year old year old male who is being seen for RECHECK (Return visit, no new concerns.)  Assessment and plan:   1. S/p BSLT for ILD:  Bilateral pleural effusions: complicated by above. S/p left chest tube placement (11/3-11/30) for pleural effusion/empyem s/p lytics 11/25-11/28 (CXR showing trapped left lung), right thoracentesis 11/27.  Chest CT (11/22) with new LLL cavitary lesion with multifocal GGO in BUL, new 1.8 cm fluid collection with surrounding fat stranding in the left axilla, decreased bilateral loculated pleural effusions, and similar small pericardial effusion. Trach decannulated 12/8. CMV/EBV 12/15 negative. Chest CT 12/20 with overall decreased size and number of bilateral infectious/inflammatory GGO, decreased but persistent LLL cavitary lesion, and decreased bilateral pleural loculated effusions. CXR at today's visit without evidence of further progression. PFTs the best they have been since transplant. Patient due for surveilence bronchoscopy tomorrow, but due to history of bleeding, and stable symptoms, improved PFTs, feel this would be low yield and risks would outweigh benefits.      Immunosuppression: s/p induction therapy with basiliximab 10/16 (and high dose IV steroid)   - Tacrolimus goal level decreased to 8-10 given recent tremors   - will also start propranolol, assess for improvement in symptoms  - MMF 1000 mg BID (decreased 11/2 given GI bleed, AZA to be avoided given TPMT)  - Prednisone taper from 10 mg/ 10 mg starting 12/5/21 to 5 mg/ 2.5 mg on 4/24/22   - 6 min walk test and PFTs in 2 months  - Given history of bleeding with bronchoscopy and stable symptoms with improved PFTs, will cancel bronchoscopy scheduled for tomorrow as risk outweighs benefit     Prophylaxis:   - Bactrim for PJP ppx  - Nystatin x 6 month course completed, no longer taking  - See below for serial serologies and viral ppx:    Donor Recipient Intervention   CMV status  Negative Negative CMV monthly   EBV status Positive Positive EBV every 3 months   HSV status N/A Positive S/p acyclovir POD #1-30 (recent infection history pre-txp)      2. Positive DSA: notes that he received two doses of rituximab in June, which is likely contributing to cross match result. DSA newly positive 11/29 with DQB5 (mfi 2522), decreased (mfi 1873) on 12/6, then increased to 3924 up from 1703 on 12/27.   - DSA pending today.      3. Possible Strongyloides exposure pre-transplant, now s/p ivermectin x 1 dose.  Donor and recipient cultures NGTD.      4. Klebsiella pneumoniae on tracheal culture  Pseudomonas fluorescens/putida on BAL culture  LLL cavity: Klebsiella initially noted trach sputum culture 11/10. Started on ceftriaxone 11/11, transitioned to ertapenem 11/12-11/13, and back to ceftriaxone 11/14.  Bronch culture 11/12 with Klebsiella and Pseudomonas fluorescens/putida.  Chest CT 11/22 with LLL cavity as above, BAL with Klebsiella pneumoniae. Histo Ag 11/22, 11/23 and Blast Ag 11/22 negative. S/p Zosyn (11/15-11/23) and levofloxacin (11/23-12/7).  Transplant ID evaluated 12/21 with recommendation to continue ceftazidime (po levofloxacin at time of discharge) until 1/18/22. Follow up CTs on 1/19/22 and 2/28/22 with sequential improvement in hydropneumothorax.  - noted, continue to monitr      5. Disseminated Candida: on blood cultures 10/20 and 10/22. Respiratory cultures with persistent Candida albicans. TC 10/23 without evidence of endocarditis.  Ophthalmology consult 10/24 with benign dilated fundoscopic exam.  Candida empyema also noted 10/25, chest tubes inadvertently removed by CVTS 10/28, left chest tube replaced by IR 11/3 as above with ongoing Candida on cultures (11/3, 11/18).  BDG fungitell positive (>500) and Aspergillus galactomannan negative 11/22, now s/p IV fluconazole finished on 1/18/22.  - Transplant f/u per above     6. HSV:chronic intermittent active infection pre-transplant  with post transplant HSV infection with crusted lesions throughout left side of jaw, s/p 30 day course of ACV through 10/9.     7. Hypogammaglobulinemia: IgG previously low at 364 on 9/7.  Noted at 265 at time of transplant, s/p IVIG 10/21, repeat IgG  on11/17 still low at 198, s/p IVIG on 11/18.  Repeat IVIG on 12/15 of 365.  IVIG indicated (and ordered) per protocol, but was told [facility]U is unable to administer IVIG and patient is unable to leave to receive it in an infusion center.  - noted    8. CKD Cr today 1.22 at baseline, at last visit he had an BRENNAN which has improved, will continue to monitor     9. Nausea and vomiting: noted at previous visit now resolved, no symptoms at visit on 9/12/22       10. SALTY: noted pre-transplant. Supposed to be on home CPAP, will need to confirm.     11. Hypomagnesemia: secondary to CNI. Mg 1.4 today.   - Continue Mg oxide 800 mg BID, may need to increase     12. HTN: BP elevated in clinic today at 128/91, likely secondary to current acute N/V.   - Continue amlodipine  - change metoprolol to pantoprazole      13. Depression/anxiety: mood is stable. Follows with his PCP.   - Continue to monitor, defer management to PCP     14. Diabetes and steroid induced hyperglycemia: last AIC of 5.3 on 11/15/21. BS have ranged from 111-145.  - continue Lantus  - check A1C     15. Multifocal acute to subacute ischemic stroke: etiology likely embolic vs perioperative. MRI brain 10/23 with infarcts noted in both cerebral hemispheres (R frontal, L corona radiata, bilateral occipital), left cerebellum, presumed associated with new Afib vs perioperative. Bilateral upper extremity paresis (R>L), suspicion for some cortical blindness. SBP goal < 140.   - Continue warfarin (also for atrial fib ), BS control and rosuvastatin     16. Afib with RVR: noted 10/18, started on amiodarone drip and converted to SR, transitioned to PO 10/21, decreased 10/29.  Metoprolol and amiodarone discontinued 11/19 d/t  intermittent bradycardia.   - on warfarin  - was on metoprolol, will switch to propranolol given concern for tremors     17. Rheumatoid arthritis: previously treated with rituximab.  Rheumatology familiar and consulted early in admission. Joint pain controlled today and does not feel as though he needs further control agents. Last rheumatology visit notes considering adding plaquenil.  - noted, continue follow up with rheumatology     19. H/o GIB: progressive hypotension and CODE BLUE for bradycardia/asystole associated with GI bleed in the setting of anticoagulation, requiring massive transfusion protocol on 10/22. EGD on 11/2 with clotted blood in stomach, adherent clot near PEG site that was clipped without active bleeding.  Most recent EGD 11/5 with ulcer noted near the PEG bumper site, gastritis, suction marks from the G-tube. GJ tube was exchanged 11/9 by GI, removed on 12/24.   - continue PPI BID    20. Health Care Maintenance  - evusheld today    I discussed the care of this patient with my attending physician, Dr. Chuck Hodge MD [facility] Visiting Resident Physician  Lung TX HPI  Edson KEVEN Thornton is a 56 year old male with NSIP/ILD, bronchiectasis, moderate PH, RA, SALTY, chronic HSV infection, hypogammaglobulinemia, steroid-induced diabetes, hypothyroidism, PFO, HTN, HLD, duodenal anomaly, anxiety, and depression. The patient was admitted on 9/5/21 from OSH for acute on chronic respiratory failure 2/2 ILD exacerbation, now s/p BSLT on 10/16/21. Prolonged vent wean s/p trach (10/29-12/8) and PEG/J tube (10/29-12/24).  Post-op course also complicated by encephalopathy and diffuse weakness, acute to subacute CVA, afib with RVR, BRENNAN, GI bleed, Candidemia/Candida empyema, and anxiety.  Code called 11/2 for bradycardia/asystole, progressive hypotension, found to have significant GI bleed, EGD with adherent clot near PEG tube site that was clipped. Discharged to ARU 12/13-12/30 for ongoing  rehabilitation.   Interval History   The patient was last seen in clinic on 1/3/22 for his first post discharge follow up. He was concerned about nausea and vomiting, which has since resolved. The patient reports feeling the best he has felt since transplant. He denied any cough, wheezing, congestion, PND, or JAMES. No fevers or chills, but does endorse night sweats, which he states are baseline and has not had associated weight loss, lymphadenopathy, or back pain. The patient denies any skin changes or oral ulcerations or plaques. Currently, the patient feels his RA is well controlled and does not have any worsened joint pain. He is due for a surveillance bronchoscopy tomorrow and has stopped taking his warfarin, transitioned to lovenox. He is concerned about ongoing tremors which are bothersome and new since starting his medication regimen.  Transplants:  10/16/2021 (Lung), Postoperative day:  331    The patient was seen and examined by Abiodun Woods MD   A complete ROS was otherwise negative except as noted in the HPI.  Current Outpatient Medications   Medication     DULoxetine (CYMBALTA) 30 MG capsule     Multiple Vitamin (MULTIVITAMIN ADULT PO)     propranolol (INDERAL) 20 MG tablet     rOPINIRole (REQUIP) 0.25 MG tablet     senna-docusate (SENOKOT-S/PERICOLACE) 8.6-50 MG tablet     acetaminophen (TYLENOL) 325 MG tablet     amLODIPine (NORVASC) 10 MG tablet     calcium carbonate 600 mg-vitamin D 400 units (CALTRATE) 600-400 MG-UNIT per tablet     diclofenac (VOLTAREN) 1 % topical gel     fluticasone (FLONASE) 50 MCG/ACT nasal spray     insulin glargine (LANTUS PEN) 100 UNIT/ML pen     insulin pen needle (32G X 4 MM) 32G X 4 MM miscellaneous     levalbuterol (XOPENEX HFA) 45 MCG/ACT inhaler     levothyroxine (SYNTHROID/LEVOTHROID) 25 MCG tablet     Magnesium Glycinate POWD     Melatonin 10 MG TABS tablet     metoprolol tartrate (LOPRESSOR) 25 MG tablet     multivitamin w/minerals (THERA-VIT-M) tablet      MYFORTIC (BRAND) 360 MG EC tablet     ondansetron (ZOFRAN-ODT) 4 MG ODT tab     pantoprazole (PROTONIX) 40 MG EC tablet     predniSONE (DELTASONE) 2.5 MG tablet     predniSONE (DELTASONE) 5 MG tablet     rosuvastatin (CRESTOR) 10 MG tablet     sulfamethoxazole-trimethoprim (BACTRIM) 400-80 MG tablet     tacrolimus (GENERIC EQUIVALENT) 1 MG capsule     tamsulosin (FLOMAX) 0.4 MG capsule     warfarin ANTICOAGULANT (COUMADIN) 1 MG tablet     warfarin ANTICOAGULANT (COUMADIN) 2 MG tablet     zolpidem (AMBIEN) 10 MG tablet     No current facility-administered medications for this visit.     No Known Allergies  Past Medical History:   Diagnosis Date     BRENNAN (acute kidney injury) (H) 10/17/2021     Anxiety      Depression      HLD (hyperlipidemia)      HTN (hypertension)      Hypothyroidism      ILD (interstitial lung disease) (H)      SALTY on CPAP      Oxygen dependent     BL 4L since ~6/2021     Primary hypertension 2/28/2022     Rheumatoid arthritis (H)     signs ~5/2020, dx 5/2021     S/P lung transplant (H) 10/16/2021     Shock liver 10/17/2021     Steroid-induced hyperglycemia      Traction bronchiectasis (H)        Past Surgical History:   Procedure Laterality Date     BRONCHOSCOPY (RIGID OR FLEXIBLE), DIAGNOSTIC N/A 1/26/2022    Procedure: BRONCHOSCOPY, WITH BRONCHOALVEOLAR LAVAGE AND BIOPSY;  Surgeon: Perlman, David Morris, MD;  Location:  GI     BRONCHOSCOPY (RIGID OR FLEXIBLE), DIAGNOSTIC N/A 4/19/2022    Procedure: BRONCHOSCOPY, WITH BRONCHOALVEOLAR LAVAGE AND BIOPSY;  Surgeon: Sherine Merrill MD;  Location:  GI     BRONCHOSCOPY (RIGID OR FLEXIBLE), DIAGNOSTIC N/A 6/21/2022    Procedure: BRONCHOSCOPY, WITH BRONCHOALVEOLAR LAVAGE AND BIOPSY;  Surgeon: Aneesh Rubio MD;  Location:  GI     COLONOSCOPY W/ BIOPSIES AND POLYPECTOMY  07/21/2020     CV CORONARY ANGIOGRAM N/A 09/08/2021    Procedure: Coronary Angiogram with possible intervention;  Surgeon: Jovon Bullock MD;  Location:  HEART CARDIAC  CATH LAB     CV RIGHT HEART CATH MEASUREMENTS RECORDED N/A 09/08/2021    Procedure: Right Heart Cath;  Surgeon: Jovon Bullock MD;  Location:  HEART CARDIAC CATH LAB     ESOPHAGOSCOPY, GASTROSCOPY, DUODENOSCOPY (EGD), COMBINED N/A 10/23/2021    Procedure: ESOPHAGOGASTRODUODENOSCOPY (EGD);  Surgeon: Miquel Pisano MD;  Location:  GI     ESOPHAGOSCOPY, GASTROSCOPY, DUODENOSCOPY (EGD), COMBINED N/A 11/02/2021    Procedure: ESOPHAGOGASTRODUODENOSCOPY (EGD);  Surgeon: Daniel Ortiz MD;  Location:  GI     ESOPHAGOSCOPY, GASTROSCOPY, DUODENOSCOPY (EGD), COMBINED N/A 11/5/2021    Procedure: ESOPHAGOGASTRODUODENOSCOPY (EGD);  Surgeon: Ronnell Hernandez MD;  Location:  GI     EXTRACTION(S) DENTAL N/A 09/22/2021    Procedure: EXTRACTION tooth #19;  Surgeon: Deepak Tobin DDS;  Location: [facility] OR     HERNIA REPAIR       IR CHEST TUBE PLACEMENT NON-TUNNELLED LEFT  11/03/2021     PICC TRIPLE LUMEN PLACEMENT Left 11/04/2021    5FR TL PICC. Right non occlusive thrombus subclavian vein.     REPLACE GASTROJEJUNOSTOMY TUBE, PERCUTANEOUS N/A 11/9/2021    Procedure: REPLACEMENT, GASTROJEJUNOSTOMY TUBE, PERCUTANEOUS;  Surgeon: Hernando Rodriguez MD;  Location: [facility] GI     right acl       TRACHEOSTOMY PERCUTANEOUS N/A 10/29/2021    Procedure: Percutaneous Tracheostomy,;  Surgeon: Celine Jenkins MD;  Location: [facility] OR     TRANSPLANT LUNG RECIPIENT SINGLE X2 Bilateral 10/16/2021    Procedure: TRANSPLANT, LUNG, RECIPIENT, BILATERAL, Bronchoscopy, on-pump perfusion, bilateral clamshell sternotomy;  Surgeon: Yanick Corral MD;  Location: [facility] OR     XR ACROMIOCLAVICULAR JOINT BILATERAL         Social History     Socioeconomic History     Marital status: Single     Spouse name: Not on file     Number of children: Not on file     Years of education: Not on file     Highest education level: Not on file   Occupational History     Not on file   Tobacco Use     Smoking status: Never Smoker      Smokeless tobacco: Never Used   Substance and Sexual Activity     Alcohol use: Never     Drug use: Never     Sexual activity: Not on file   Other Topics Concern     Parent/sibling w/ CABG, MI or angioplasty before 65F 55M? Not Asked   Social History Narrative     Not on file     Social Determinants of Health     Financial Resource Strain: Not on file   Food Insecurity: Not on file   Transportation Needs: Not on file   Physical Activity: Not on file   Stress: Not on file   Social Connections: Not on file   Intimate Partner Violence: Not on file   Housing Stability: Not on file         /83   Pulse 71   Temp 98.2  F (36.8  C)   Wt 74.8 kg (165 lb)   SpO2 95%   BMI 28.32 kg/m    Body mass index is 28.32 kg/m .  Exam:   GENERAL APPEARANCE: Well developed, well nourished, alert, and in no apparent distress.  EYES: PERRL, EOMI  HENT: Nasal mucosa with no edema and no hyperemia. No nasal polyps.  EARS: Canals clear, TMs normal  MOUTH: Oral mucosa is moist, without any lesions, no tonsillar enlargement, no oropharyngeal exudate.  NECK: supple, no masses, no thyromegaly.  LYMPHATICS: No significant axillary, cervical, or supraclavicular nodes.  RESP: normal percussion, good air flow throughout.  No crackles. No rhonchi. No wheezes.  CV: Normal S1, S2, regular rhythm, normal rate. No murmur.  No rub. No gallop. No LE edema.   ABDOMEN:  Bowel sounds normal, soft, nontender, no HSM or masses.   MS: extremities normal. No clubbing. No cyanosis.  SKIN: no rash on limited exam  NEURO: Mentation intact, speech normal, normal strength and tone, normal gait and stance  PSYCH: mentation appears normal. and affect normal/bright  Results:  Recent Results (from the past 168 hour(s))   Basic metabolic panel    Collection Time: 09/06/22  8:51 AM   Result Value Ref Range    Glucose (External) 138 (H) 70 - 99 mg/dL    Urea Nitrogen (External) 17 6 - 22 mg/dL    Creatinine (External) 1.31 (H) 0.73 - 1.18 mg/dL    BUN/Creatinine  Ratio (External) 13.0 10.0 - 25.0    Sodium (External) 147 (H) 136 - 145 meq/L    Potassium (External) 4.4 3.5 - 5.1 meq/L    Chloride (External) (External) 110 (H) 98 - 109 meq/L    CO2 (External) 27 22 - 31 meq/L    Anion Gap (External) 14 6 - 20 meq/L    Calcium (External) 10.2 8.5 - 10.5 mg/dL    GFR Estimated (External) 56 (L) >=60 mL/min/1.73m2   Magnesium    Collection Time: 09/06/22  8:51 AM   Result Value Ref Range    Magnesium (External) 1.6 1.6 - 2.6 mg/dL   CBC with platelets    Collection Time: 09/06/22  8:51 AM   Result Value Ref Range    WBC Count (External) 6.8 4.0 - 11.0 K/uL    RBC Count (External) 4.57 4.40 - 5.80 M/uL    Hemoglobin (External) 11.6 (L) 13.5 - 17.5 g/dL    Hematocrit (External) 38.1 (L) 40.0 - 50.0 %    MCV (External) 83.4 80.0 - 98.0 fL    MCH (External) 25.4 (L) 25.5 - 34.0 pg    MCHC (External) 30.4 (L) 31.5 - 36.5 g/dL    RDW (External) 14.6 11.5 - 15.5 %    Platelet Count (External) 237 140 - 400 K/uL   INR    Collection Time: 09/06/22  8:51 AM   Result Value Ref Range    PT - Prothrombine Time (External) 25.4 (H) 12.0 - 14.5 secs    INR (External) 2.35 (H) 0.90 - 1.10   Manual Differential    Collection Time: 09/06/22  8:51 AM   Result Value Ref Range    Method (External) Auto     Absolute Neutrophils (External) 4.1 1.8 - 8.0 K/uL    Absolute Lymphocytes (External) 1.8 0.8 - 4.1 K/uL    Absolute Monocytes (External) 0.7 0.0 - 1.0 K/uL    Absolute Eosinophils (External) 0.1 0.0 - 0.7 K/uL    Absolute Basophils (External) 0.0 0.0 - 0.2 K/uL    Absolute Immature Granulocytes (External) 0.10 (H) 0.00 - 0.06 K/uL    % Neutrophils (External) 60.3 %    % Lymphocytes (External) 26.6 %    % Monocytes (External) 9.8 %    % Immature Granulocytes (External) 1.5 %    % Eosinophils (External) 1.2 %    % Basophils (External) 0.6 %    Nucleated RBCs (External) 0 0 - 1 /100 WBC   INR    Collection Time: 09/12/22  8:52 AM   Result Value Ref Range    INR 1.16 (H) 0.85 - 1.15   CBC with  platelets    Collection Time: 09/12/22  8:52 AM   Result Value Ref Range    WBC Count 7.8 4.0 - 11.0 10e3/uL    RBC Count 4.29 (L) 4.40 - 5.90 10e6/uL    Hemoglobin 11.0 (L) 13.3 - 17.7 g/dL    Hematocrit 35.1 (L) 40.0 - 53.0 %    MCV 82 78 - 100 fL    MCH 25.6 (L) 26.5 - 33.0 pg    MCHC 31.3 (L) 31.5 - 36.5 g/dL    RDW 14.6 10.0 - 15.0 %    Platelet Count 217 150 - 450 10e3/uL   Magnesium    Collection Time: 09/12/22  8:52 AM   Result Value Ref Range    Magnesium 1.7 1.7 - 2.3 mg/dL   Basic metabolic panel    Collection Time: 09/12/22  8:52 AM   Result Value Ref Range    Creatinine 1.22 (H) 0.67 - 1.17 mg/dL    Sodium 141 136 - 145 mmol/L    Potassium 4.1 3.4 - 5.3 mmol/L    Urea Nitrogen 22.0 (H) 6.0 - 20.0 mg/dL    Chloride 105 98 - 107 mmol/L    Carbon Dioxide (CO2) 23 22 - 29 mmol/L    Anion Gap 13 7 - 15 mmol/L    Glucose 111 (H) 70 - 99 mg/dL    GFR Estimate 69 >60 mL/min/1.73m2    Calcium 9.4 8.6 - 10.0 mg/dL     PFT     CXR 09/12/22   Surgical changes of bilateral lung transplantation with similar linear  opacities at the lung bases, likely scarring or atelectasis. No new  focal pulmonary opacity.    Results as noted above.    PFT Interpretation:  Moderate to severe obstructive ventilatory defect.  Increased from previous.  Best since lung transplantation  Valid Maneuver

## 2022-09-12 NOTE — PATIENT INSTRUCTIONS
Patient Instructions  1. Continue to hydrate with 60-70 oz fluids daily.  2. Continue to exercise daily or most days of the week.  3. Follow up with your primary care provider for annual gender health maintenance procedures.  4. Follow up with colonoscopy schedule.  5. Follow up with annual dermatology visits.  6. You should get the COVID vaccine - Pfizer or Moderna. A number of lung transplant patients have gotten sick with COVID even after receiving the vaccines.  Based on our recent experience, it can be life-threatening to get COVID  even after being vaccinated. Please continue to act like you did not get the COVID vaccine - social distancing, wearing a mask, good hand hygiene, etc. If the people around you are vaccinated, it will help reduce the risk of you getting COVID. All members of your household should be vaccinated.  7. You should get your flu vaccine 1 month after your 2 COVID shots.   8. You can stop taking Nystatin swish and swallow.   9. We are going to cancel our 9/13 bronchoscopy.   10. We are going to switch you from Metoprolol to Propranolol 40mg (2 tablets) twice a day. Do not take metoprolol and propranolol at the same time. Keep an eye on your blood pressure and heart rate and let [facility] know what they're running.     Next transplant clinic appointment: 2 months with CXR, DEXA scan, labs 6 minute walk test, and full PFTs  Next lab draw: per tacrolimus level, otherwise monthly for transplant labs.      ~~~~~~~~~~~~~~~~~~~~~~~~~    Thoracic Transplant Office phone [phone], fax [phone]    Office Hours 8:30 - 5:00     For after-hours urgent issues, please dial [phone], and ask to speak with the Thoracic Transplant Coordinator On-Call.  --------------------  To expedite your medication refill(s), please contact your pharmacy and have them fax a refill request to: [phone]  .   *Please allow 3 business days for routine medication refills.  *Please allow 5 business days for  controlled substance medication refills.    **For Diabetic medications and supplies refill(s), please contact your pharmacy and have them contact your Endocrine team.  --------------------  For scheduling appointments call [phone].  --------------------  Please Note: If you are active on [employer], all future test results will be sent by [employer] message only, and will no longer be called to patient. You may also receive communication directly from your physician.

## 2022-09-13 LAB
DONOR IDENTIFICATION: NORMAL
DQB5: 1681
DSA COMMENTS: NORMAL
DSA PRESENT: YES
DSA TEST METHOD: NORMAL
ORGAN: NORMAL
SA 1 CELL: NORMAL
SA 1 TEST METHOD: NORMAL
SA 2 CELL: NORMAL
SA 2 TEST METHOD: NORMAL
SA1 HI RISK ABY: NORMAL
SA1 MOD RISK ABY: NORMAL
SA2 HI RISK ABY: NORMAL
SA2 MOD RISK ABY: NORMAL
UNACCEPTABLE ANTIGENS: NORMAL
UNOS CPRA: 26
ZZZSA 1  COMMENTS: NORMAL
ZZZSA 2 COMMENTS: NORMAL

## 2022-09-14 NOTE — PROGRESS NOTES
[facility] AntiXa lab ordered signed by Dr. Woods today. Order faxed to [facility] at [phone], with anticipated lab 9/16/22.    ESSIE COLÓN RN-BC, [facility]  Anticoagulation Clinic  [phone]

## 2022-09-15 ENCOUNTER — TELEPHONE (OUTPATIENT)
Dept: TRANSPLANT | Facility: CLINIC | Age: 57
End: 2022-09-15

## 2022-09-15 DIAGNOSIS — Z79.4 TYPE 2 DIABETES MELLITUS WITH HYPERGLYCEMIA, WITH LONG-TERM CURRENT USE OF INSULIN (H): ICD-10-CM

## 2022-09-15 DIAGNOSIS — D84.9 IMMUNOSUPPRESSED STATUS (H): ICD-10-CM

## 2022-09-15 DIAGNOSIS — Z94.2 S/P LUNG TRANSPLANT (H): Primary | ICD-10-CM

## 2022-09-15 DIAGNOSIS — E11.65 TYPE 2 DIABETES MELLITUS WITH HYPERGLYCEMIA, WITH LONG-TERM CURRENT USE OF INSULIN (H): ICD-10-CM

## 2022-09-15 LAB
EBV DNA # SPEC NAA+PROBE: NOT DETECTED COPIES/ML
TACROLIMUS BLD-MCNC: 9.5 UG/L (ref 5–15)
TME LAST DOSE: NORMAL H
TME LAST DOSE: NORMAL H

## 2022-09-15 NOTE — TELEPHONE ENCOUNTER
Please call Woody question about insulins     He wants another refill for insulin not on his chart?

## 2022-09-15 NOTE — RESULT ENCOUNTER NOTE
Tacrolimus level 9.5 at 12 hours, on 9/12/2022.  Goal 8-10.   Current dose 3 mg in AM, 2 mg in PM    Level at goal, no change in dose.   [employer] message sent

## 2022-09-19 ENCOUNTER — ANTICOAGULATION THERAPY VISIT (OUTPATIENT)
Dept: ANTICOAGULATION | Facility: CLINIC | Age: 57
End: 2022-09-19

## 2022-09-19 DIAGNOSIS — I48.91 ATRIAL FIBRILLATION, UNSPECIFIED TYPE (H): Primary | ICD-10-CM

## 2022-09-19 NOTE — PROGRESS NOTES
Hep Xa from  0.69 drawn at 2:04 PM      ANTICOAGULATION MANAGEMENT     Edson Thornton, 57 year old male on Enoxaparin    Current dosinmg Q 12 Hours   Administration times: 9:30 AM and 9:30 PM   Supplies: 40 mg pre-filled enoxaparin syringe     Missed doses in last 72 hours: No    Signs or symptoms of bleeding or clotting: No    Goal: 0.6-1.0  Lab draw done 4 to 6 hours after last injection: Yes    No results for input(s): AL[facility], AAUFH in the last 168 hours.    Lab Results   Component Value Date    CR 1.2022       Wt Readings from Last 3 Encounters:   22 74.8 kg (165 lb)   22 72.3 kg (159 lb 8 oz)   22 69.8 kg (153 lb 14.4 oz)         PLAN:    Dosing instructions: Continue current dose: Lovenox 40mg Q 12 Hours      Next recommended lab: 3 days  Patient using outside facility for labs    Critical priority set: Yes    Telephone call with Bret who verbalizes understanding and agrees to plan and who agrees to plan and repeated back plan correctly    Plan made with [facility] Pharmacist Usha Choi, RN  Anticoagulation Clinic   [phone]

## 2022-09-19 NOTE — PROGRESS NOTES
ANTICOAGULATION MANAGEMENT     Edson Thornton 57 year old male is on warfarin with subtherapeutic INR result. (Goal INR 2.0-3.0)    Recent labs: (last 7 days)     09/16/22  0912   INR 1.59*       ASSESSMENT       Source(s): Patient/Caregiver Call       Warfarin doses taken: Warfarin taken as instructed    Diet: No new diet changes identified    New illness, injury, or hospitalization: No    Medication/supplement changes: None noted    Signs or symptoms of bleeding or clotting: No    Previous INR: Subtherapeutic    Additional findings: Bridging with Enoxaparin until INR >= 2.0       PLAN     Recommended plan for no diet, medication or health factor changes affecting INR     Dosing Instructions: booster dose then continue your current warfarin dose with next INR in 3 days       Summary  As of 9/19/2022    Full warfarin instructions:  9/19: 8 mg; Otherwise 5 mg every Tue, Fri; 4 mg all other days   Next INR check:  9/22/2022             Telephone call with Bret who verbalizes understanding and agrees to plan and who agrees to plan and repeated back plan correctly    Patient using outside facility for labs    Education provided: None required    Plan made with [facility] Pharmacist Usha Choi, RN  Anticoagulation Clinic  9/19/2022    _______________________________________________________________________     Anticoagulation Episode Summary     Current INR goal:  2.0-3.0   TTR:  46.0 % (8.4 mo)   Target end date:  Indefinite   Send INR reminders to:  [facility]    Indications    Atrial fibrillation  unspecified type (H) [I48.91]           Comments:  Zay (p) [phone]  (f) [phone]         Anticoagulation Care Providers     Provider Role Specialty Phone number    Abiodun Woods MD Referring Pulmonary Disease [phone]

## 2022-09-20 DIAGNOSIS — Z79.4 TYPE 2 DIABETES MELLITUS WITH HYPERGLYCEMIA, WITH LONG-TERM CURRENT USE OF INSULIN (H): ICD-10-CM

## 2022-09-20 DIAGNOSIS — D84.9 IMMUNOSUPPRESSED STATUS (H): ICD-10-CM

## 2022-09-20 DIAGNOSIS — E11.65 TYPE 2 DIABETES MELLITUS WITH HYPERGLYCEMIA, WITH LONG-TERM CURRENT USE OF INSULIN (H): ICD-10-CM

## 2022-09-20 DIAGNOSIS — Z94.2 S/P LUNG TRANSPLANT (H): ICD-10-CM

## 2022-09-22 ENCOUNTER — TELEPHONE (OUTPATIENT)
Dept: ANTICOAGULATION | Facility: CLINIC | Age: 57
End: 2022-09-22

## 2022-09-22 NOTE — TELEPHONE ENCOUNTER
9/22/22    Spoke to patient, he is getting an INR tomorrow 9/23/22.  Writer told patient that we will see if he needs another Anti Xa level drawn tomorrow.  If so, we will need to send orders to outside lab.  TIFFANY Nguyen

## 2022-09-23 ENCOUNTER — ANTICOAGULATION THERAPY VISIT (OUTPATIENT)
Dept: ANTICOAGULATION | Facility: CLINIC | Age: 57
End: 2022-09-23

## 2022-09-23 ENCOUNTER — TELEPHONE (OUTPATIENT)
Dept: TRANSPLANT | Facility: CLINIC | Age: 57
End: 2022-09-23

## 2022-09-23 DIAGNOSIS — I48.91 ATRIAL FIBRILLATION, UNSPECIFIED TYPE (H): Primary | ICD-10-CM

## 2022-09-23 LAB — INR (EXTERNAL): 2.61 (ref 0.9–1.1)

## 2022-09-23 NOTE — TELEPHONE ENCOUNTER
Patient calls to say his PCP is requesting orders for Evusheld. Patient last dose of Evusheld was 4/18/2022. Patient is due for Evusheld again 10/18/2022. However, patient has not gotten COVID vaccine, and is starting series now. Discussed with Dr. Woods, orders faxed with instructions to give Evusheld 2 weeks after 2nd dose of COVID vaccine.     Got first COVID shot. Last week, plan to do 2nd dose when due, and then flu vaccine 1 month afterwards as discussed at last clinic visit.     Patient questions answered to patient's apparent satisfaction. Will call back with additional questions, concerns, updates.

## 2022-09-23 NOTE — PROGRESS NOTES
ANTICOAGULATION MANAGEMENT     Edson Thornton 57 year old male is on warfarin with therapeutic INR result. (Goal INR 2.0-3.0)  Protime 12.0 - 14.5 secs 27.5 High     INR 0.90 - 1.10 2.61 High     Resulting Agency  [facility]         ASSESSMENT       Source(s): Chart Review and Patient/Caregiver Call       Warfarin doses taken: Resumed warfarin on 9/12/22 after interruption for surgery/procedure which may be affecting INR    Diet: No new diet changes identified    New illness, injury, or hospitalization: No    Medication/supplement changes: None noted    Signs or symptoms of bleeding or clotting: No    Previous INR: Subtherapeutic    Additional findings: Bridging with Enoxaparin until today 9/23/22.  INR result 2.61, instructed patient to STOP Lovenox.  Consulted Usha Singh [facility], for INR result rapid rise.       PLAN     Recommended plan for no diet, medication or health factor changes affecting INR     Dosing Instructions: Continue your current warfarin dose with next INR in 1 week       Summary  As of 9/23/2022    Full warfarin instructions:  5 mg every Tue, Fri; 4 mg all other days   Next INR check:  9/28/2022             Telephone call with Eaton who verbalizes understanding and agrees to plan and who agrees to plan and repeated back plan correctly    Patient using outside facility for labs    Education provided: Lovenox/Heparin education provided: disposal of syringes     Plan made with [facility] Pharmacist Nick Richmond, RN  Anticoagulation Clinic  9/23/2022    _______________________________________________________________________     Anticoagulation Episode Summary     Current INR goal:  2.0-3.0   TTR:  46.3 % (8.6 mo)   Target end date:  Indefinite   Send INR reminders to:  [facility]    Indications    Atrial fibrillation  unspecified type (H) [I48.91]           Comments:  Zay (p) [phone]  (f) [phone]         Anticoagulation Care Providers      Provider Role Specialty Phone number    Abiodun Woods MD Referring Pulmonary Disease [phone]

## 2022-09-23 NOTE — LETTER
PHYSICIAN ORDERS      DATE & TIME ISSUED: 2022 4:53 PM  PATIENT NAME: Edson Thornton   : 1965     [facility] MR# [if applicable]: [id number]     DIAGNOSIS:  Long Term use of medications  Z79.899 and Lung Transplant  Z94.2, Immunosuppressed status D84.9.     Please give patient Evusheld (cilgavimab 300mg/tixagevimab 300mg) 2 weeks after his second COVID vaccine.     Any questions please call: Mady [phone].    Thank you!      .

## 2022-09-26 ENCOUNTER — TELEPHONE (OUTPATIENT)
Dept: TRANSPLANT | Facility: CLINIC | Age: 57
End: 2022-09-26

## 2022-09-26 DIAGNOSIS — E83.42 HYPOMAGNESEMIA: ICD-10-CM

## 2022-09-26 DIAGNOSIS — Z94.2 S/P LUNG TRANSPLANT (H): Chronic | ICD-10-CM

## 2022-09-26 DIAGNOSIS — D84.9 IMMUNOSUPPRESSED STATUS (H): ICD-10-CM

## 2022-09-26 RX ORDER — MAGNESIUM GLYCINATE 15 %
300 POWDER (GRAM) MISCELLANEOUS 3 TIMES DAILY
Qty: 18 G | Refills: 11 | COMMUNITY
Start: 2022-09-26 | End: 2022-11-14

## 2022-09-26 NOTE — TELEPHONE ENCOUNTER
Patient's Magnesium remains low at 1.3, currently taking [employer] Magnesium 300 mg BID, checking with Transplant Pharmacist about dosing. Per Dr. Woods at this time to do IV magnesium 4 G. Called and spoke to patient about where he has gotten infusions in the past.     [facility] will fax transplant office a form to fill and fax back and then writer will call scheduling to confirm this was received and then infusion center should be able to set up patient for infusion. Tomorrow 9/27 at 12 PM for 4.5 hour timeslot confirmed with patient the plan and provided number for infusion center.    Per Michael Weldon patient to increase Oral magnesium supplement to 300 mg TID. Ok to take a dose with Myfortic if needed. Will recheck BMP and Mag on Thursday 9/29 per patient

## 2022-09-26 NOTE — LETTER
PHYSICIAN ORDERS      DATE & TIME ISSUED: 2022 11:13 AM  PATIENT NAME: Edson Thornton   : 1965     Phone: [phone]   [facility] MR# [if applicable]: [id number]     DIAGNOSIS:  Hypomagnesemia E83.42; s/p lung transplant Z48.24  Please infuse Magnesium Sulfate 4 Grams intravenously One time. Please set up patient for infusion.      Any questions please call: [phone]    Please fax these results to [phone].      .

## 2022-09-26 NOTE — TELEPHONE ENCOUNTER
General  Route to LPN    Reason for call: Bret wanted to speak with Rosangela regarding a magnesium order they discussed earlier.     Call back needed? Yes  Return Call Needed  Same as documented in contacts section

## 2022-09-27 ENCOUNTER — DOCUMENTATION ONLY (OUTPATIENT)
Dept: TRANSPLANT | Facility: CLINIC | Age: 57
End: 2022-09-27

## 2022-09-27 ASSESSMENT — ENCOUNTER SYMPTOMS: NEW SYMPTOMS OF CORONARY ARTERY DISEASE: 0

## 2022-09-29 ENCOUNTER — ANTICOAGULATION THERAPY VISIT (OUTPATIENT)
Dept: ANTICOAGULATION | Facility: CLINIC | Age: 57
End: 2022-09-29

## 2022-09-29 DIAGNOSIS — I48.91 ATRIAL FIBRILLATION, UNSPECIFIED TYPE (H): Primary | ICD-10-CM

## 2022-09-29 LAB — INR (EXTERNAL): 2.58 (ref 0.9–1.1)

## 2022-09-29 NOTE — PROGRESS NOTES
ANTICOAGULATION MANAGEMENT     Edson Thornton 57 year old male is on warfarin with therapeutic INR result. (Goal INR 2.0-3.0)    Recent labs: (last 7 days)     09/29/22  1624   INR 2.58*       ASSESSMENT       Source(s): Chart Review and Patient/Caregiver Call       Warfarin doses taken: Warfarin taken as instructed    Diet: No new diet changes identified    New illness, injury, or hospitalization: No    Medication/supplement changes: IV magnesium replaced    Signs or symptoms of bleeding or clotting: No    Previous INR: Therapeutic last 2(+) visits    Additional findings: None       PLAN     Recommended plan for no diet, medication or health factor changes affecting INR     Dosing Instructions: Continue your current warfarin dose with next INR in 5 days       Summary  As of 9/29/2022    Full warfarin instructions:  5 mg every Tue, Fri; 4 mg all other days   Next INR check:  10/4/2022             Telephone call with Bret who verbalizes understanding and agrees to plan and who agrees to plan and repeated back plan correctly    Patient using outside facility for labs    Education provided: None required    Plan made per [facility] anticoagulation protocol    Nick Hunter, RN  Anticoagulation Clinic  9/29/2022    _______________________________________________________________________     Anticoagulation Episode Summary     Current INR goal:  2.0-3.0   TTR:  47.6 % (8.8 mo)   Target end date:  Indefinite   Send INR reminders to:  [facility]    Indications    Atrial fibrillation  unspecified type (H) [I48.91]           Comments:  Zay (p) [phone]  (f) [phone]         Anticoagulation Care Providers     Provider Role Specialty Phone number    Abiodun Woods MD Referring Pulmonary Disease [phone]

## 2022-10-03 ENCOUNTER — HEALTH MAINTENANCE LETTER (OUTPATIENT)
Age: 57
End: 2022-10-03

## 2022-10-04 ENCOUNTER — LAB (OUTPATIENT)
Dept: LAB | Facility: CLINIC | Age: 57
End: 2022-10-04
Payer: COMMERCIAL

## 2022-10-04 ENCOUNTER — ANTICOAGULATION THERAPY VISIT (OUTPATIENT)
Dept: ANTICOAGULATION | Facility: CLINIC | Age: 57
End: 2022-10-04

## 2022-10-04 DIAGNOSIS — I48.91 ATRIAL FIBRILLATION, UNSPECIFIED TYPE (H): Primary | ICD-10-CM

## 2022-10-04 LAB — INR (EXTERNAL): 2.61 (ref 0.9–1.1)

## 2022-10-04 PROCEDURE — 80197 ASSAY OF TACROLIMUS: CPT | Performed by: INTERNAL MEDICINE

## 2022-10-04 NOTE — PROGRESS NOTES
ANTICOAGULATION MANAGEMENT     Edson Thornton 57 year old male is on warfarin with therapeutic INR result. (Goal INR 2.0-3.0)    Recent labs: (last 7 days)     10/04/22  0000   INR 2.61*       ASSESSMENT       Source(s): Chart Review    Previous INR was Therapeutic last 2(+) visits    Medication, diet, health changes since last INR chart reviewed; none identified           PLAN     Recommended plan for no diet, medication or health factor changes affecting INR     Dosing Instructions: Continue your current warfarin dose with next INR in 1 week       Summary  As of 10/4/2022    Full warfarin instructions:  5 mg every Tue, Fri; 4 mg all other days   Next INR check:  10/11/2022             Detailed voice message left for Bret with dosing instructions and follow up date.   Sent [employer] message with dosing and follow up instructions    Patient using outside facility for labs    Education provided: Please call back if any changes to your diet, medications or how you've been taking warfarin and Contact [phone] with any changes, questions or concerns.     Plan made per [facility] anticoagulation protocol    Suni Choi RN  Anticoagulation Clinic  10/4/2022    _______________________________________________________________________     Anticoagulation Episode Summary     Current INR goal:  2.0-3.0   TTR:  48.5 % (8.9 mo)   Target end date:  Indefinite   Send INR reminders to:  [facility]    Indications    Atrial fibrillation  unspecified type (H) [I48.91]           Comments:  Zay (p) [phone]  (f) [phone]         Anticoagulation Care Providers     Provider Role Specialty Phone number    Abiodun Woods MD Referring Pulmonary Disease [phone]

## 2022-10-06 LAB
TACROLIMUS BLD-MCNC: 11.3 UG/L (ref 5–15)
TME LAST DOSE: NORMAL H
TME LAST DOSE: NORMAL H

## 2022-10-07 ENCOUNTER — TELEPHONE (OUTPATIENT)
Dept: TRANSPLANT | Facility: CLINIC | Age: 57
End: 2022-10-07

## 2022-10-07 DIAGNOSIS — Z94.2 S/P LUNG TRANSPLANT (H): Primary | ICD-10-CM

## 2022-10-07 DIAGNOSIS — D84.9 IMMUNOSUPPRESSED STATUS (H): ICD-10-CM

## 2022-10-07 RX ORDER — TACROLIMUS 1 MG/1
2 CAPSULE ORAL 2 TIMES DAILY
Qty: 120 CAPSULE | Refills: 11 | Status: SHIPPED | OUTPATIENT
Start: 2022-10-07 | End: 2023-01-31

## 2022-10-07 NOTE — TELEPHONE ENCOUNTER
Tacrolimus level 11.3 at 12 hours, on 10/4/2022.  Goal 8-10.   Current dose 3 mg in AM, 2 mg in PM    Dose changed to 2 mg in AM, 2 mg in PM   Recheck level in 3-5    Discussed with patient   MyChart message sent

## 2022-10-11 ENCOUNTER — ANTICOAGULATION THERAPY VISIT (OUTPATIENT)
Dept: ANTICOAGULATION | Facility: CLINIC | Age: 57
End: 2022-10-11

## 2022-10-11 ENCOUNTER — LAB (OUTPATIENT)
Dept: LAB | Facility: CLINIC | Age: 57
End: 2022-10-11
Payer: COMMERCIAL

## 2022-10-11 DIAGNOSIS — Z79.899 ENCOUNTER FOR LONG-TERM (CURRENT) USE OF HIGH-RISK MEDICATION: ICD-10-CM

## 2022-10-11 DIAGNOSIS — I48.91 ATRIAL FIBRILLATION, UNSPECIFIED TYPE (H): Primary | ICD-10-CM

## 2022-10-11 DIAGNOSIS — Z94.2 S/P LUNG TRANSPLANT (H): ICD-10-CM

## 2022-10-11 DIAGNOSIS — D84.9 IMMUNOSUPPRESSED STATUS (H): ICD-10-CM

## 2022-10-11 DIAGNOSIS — E83.42 HYPOMAGNESEMIA: ICD-10-CM

## 2022-10-11 PROCEDURE — 80197 ASSAY OF TACROLIMUS: CPT

## 2022-10-11 NOTE — PROGRESS NOTES
ANTICOAGULATION MANAGEMENT     Edson Thornton 57 year old male is on warfarin with therapeutic INR result. (Goal INR 2.0-3.0)    Recent labs: (last 7 days)     10/11/22  0921   INR 2.89*       ASSESSMENT       Source(s): Chart Review and Patient/Caregiver Call       Warfarin doses taken: Warfarin taken as instructed    Diet: No new diet changes identified    New illness, injury, or hospitalization: No    Medication/supplement changes: None noted    Signs or symptoms of bleeding or clotting: No    Previous INR: Therapeutic last 2(+) visits    Additional findings: INR trending up. So small dose change today.        PLAN     Recommended plan for no diet, medication or health factor changes affecting INR     Dosing Instructions: decrease your warfarin dose (3% change) with next INR in 1 week       Summary  As of 10/11/2022    Full warfarin instructions:  5 mg every Fri; 4 mg all other days   Next INR check:  10/18/2022             Telephone call with Bret who verbalizes understanding and agrees to plan and who agrees to plan and repeated back plan correctly    Patient using outside facility for labs    Education provided: Goal range and significance of current result and Importance of therapeutic range    Plan made per [facility] anticoagulation protocol    Terrie Cedillo RN  Anticoagulation Clinic  10/11/2022    _______________________________________________________________________     Anticoagulation Episode Summary     Current INR goal:  2.0-3.0   TTR:  49.8 % (9.2 mo)   Target end date:  Indefinite   Send INR reminders to:  [facility]    Indications    Atrial fibrillation  unspecified type (H) [I48.91]           Comments:  Zay (p) [phone]  (f) [phone]         Anticoagulation Care Providers     Provider Role Specialty Phone number    Abiodun Woods MD Referring Pulmonary Disease [phone]

## 2022-10-14 LAB
TACROLIMUS BLD-MCNC: 10.4 UG/L (ref 5–15)
TME LAST DOSE: NORMAL H
TME LAST DOSE: NORMAL H

## 2022-10-14 NOTE — RESULT ENCOUNTER NOTE
Tacrolimus level 10.4 at 12 hours, on 10/11/2022.  Goal 8-10.   Current dose 2 mg in AM, 2 mg in PM    Level close to goal, no change in dose.   [employer] message sent

## 2022-10-19 ENCOUNTER — LAB (OUTPATIENT)
Dept: LAB | Facility: CLINIC | Age: 57
End: 2022-10-19
Payer: COMMERCIAL

## 2022-10-19 PROCEDURE — 80197 ASSAY OF TACROLIMUS: CPT | Performed by: INTERNAL MEDICINE

## 2022-10-20 ENCOUNTER — ANTICOAGULATION THERAPY VISIT (OUTPATIENT)
Dept: ANTICOAGULATION | Facility: CLINIC | Age: 57
End: 2022-10-20

## 2022-10-20 DIAGNOSIS — I48.91 ATRIAL FIBRILLATION, UNSPECIFIED TYPE (H): Primary | ICD-10-CM

## 2022-10-20 NOTE — PROGRESS NOTES
ANTICOAGULATION MANAGEMENT     Edson Thornton 57 year old male is on warfarin with therapeutic INR result. (Goal INR 2.0-3.0)    Recent labs: (last 7 days)     10/19/22  0849   INR 2.39*       ASSESSMENT       Source(s): Chart Review and Patient/Caregiver Call       Warfarin doses taken: Warfarin taken as instructed    Diet: No new diet changes identified    New illness, injury, or hospitalization: No    Medication/supplement changes: Patient started Hydrochloroquin. According to the literature this is friendly with warfarin.     Signs or symptoms of bleeding or clotting: No    Previous INR: Therapeutic last 2(+) visits    Additional findings: Bret stayed with 5mg twice weekly.  Calender is updated       PLAN     Recommended plan for no diet, medication or health factor changes affecting INR     Dosing Instructions: Continue your current warfarin dose with next INR in 1 week       Summary  As of 10/20/2022    Full warfarin instructions:  5 mg every Tue, Fri; 4 mg all other days; Starting 10/20/2022   Next INR check:  10/27/2022             Telephone call with Bret who verbalizes understanding and agrees to plan and who agrees to plan and repeated back plan correctly    Patient using outside facility for labs    Education provided:     Goal range and lab monitoring: goal range and significance of current result and Importance of therapeutic range    Plan made per [facility] anticoagulation protocol    Terrie Cedillo RN  Anticoagulation Clinic  10/20/2022    _______________________________________________________________________     Anticoagulation Episode Summary     Current INR goal:  2.0-3.0   TTR:  51.3 % (9.5 mo)   Target end date:  Indefinite   Send INR reminders to:  [facility]    Indications    Atrial fibrillation  unspecified type (H) [I48.91]           Comments:  Zay (p) [phone]  (f) [phone]         Anticoagulation Care Providers     Provider Role Specialty Phone number    Abiodun Woods  MD Yanick Referring Pulmonary Disease [phone]

## 2022-10-21 LAB
TACROLIMUS BLD-MCNC: 7.6 UG/L (ref 5–15)
TME LAST DOSE: NORMAL H
TME LAST DOSE: NORMAL H

## 2022-10-21 NOTE — RESULT ENCOUNTER NOTE
Tacrolimus level 7.6 at 12 hours, on 10/19/2022.  Goal 8-10.   Current dose 2 mg in AM, 2 mg in PM    Level close to goal, no change in dose.   [employer] message sent

## 2022-10-25 ENCOUNTER — ANTICOAGULATION THERAPY VISIT (OUTPATIENT)
Dept: ANTICOAGULATION | Facility: CLINIC | Age: 57
End: 2022-10-25

## 2022-10-25 DIAGNOSIS — I48.91 ATRIAL FIBRILLATION, UNSPECIFIED TYPE (H): Primary | ICD-10-CM

## 2022-10-25 NOTE — PROGRESS NOTES
ANTICOAGULATION MANAGEMENT     Edson Thornton 57 year old male is on warfarin with therapeutic INR result. (Goal INR 2.0-3.0)    Recent labs: (last 7 days)     10/25/22  0851   INR 2.62*       ASSESSMENT       Source(s): Patient/Caregiver Call       Warfarin doses taken: Warfarin taken as instructed    Diet: No new diet changes identified    New illness, injury, or hospitalization: No    Medication/supplement changes: None noted    Signs or symptoms of bleeding or clotting: No    Previous INR: Therapeutic last 2(+) visits    Additional findings: None       PLAN     Recommended plan for no diet, medication or health factor changes affecting INR     Dosing Instructions: Continue your current warfarin dose with next INR in 1 week       Summary  As of 10/25/2022    Full warfarin instructions:  5 mg every Tue, Fri; 4 mg all other days; Starting 10/25/2022   Next INR check:  11/1/2022             Telephone call with Bret who verbalizes understanding and agrees to plan and who agrees to plan and repeated back plan correctly    Patient using outside facility for labs    Education provided:     None required    Plan made per [facility] anticoagulation protocol    Suni Choi, RN  Anticoagulation Clinic  10/25/2022    _______________________________________________________________________     Anticoagulation Episode Summary     Current INR goal:  2.0-3.0   TTR:  52.3 % (9.7 mo)   Target end date:  Indefinite   Send INR reminders to:  [facility]    Indications    Atrial fibrillation  unspecified type (H) [I48.91]           Comments:  Zay (p) [phone]  (f) [phone]         Anticoagulation Care Providers     Provider Role Specialty Phone number    Abiodun Woods MD Referring Pulmonary Disease [phone]

## 2022-11-02 ENCOUNTER — MYC MEDICAL ADVICE (OUTPATIENT)
Dept: ANTICOAGULATION | Facility: CLINIC | Age: 57
End: 2022-11-02

## 2022-11-02 NOTE — TELEPHONE ENCOUNTER
Bret,     Our records show you were due to check your INR on 11/1/22.    Please schedule an appointment at your local lab as soon as possible. If you recently tested, but have not yet heard from us, please reply or give us a call at [phone] so we can work with you and your local lab to obtain the results.      ESSIE COLÓN RN  [facility] Anticoagulation Management Program

## 2022-11-04 ENCOUNTER — ALLIED HEALTH/NURSE VISIT (OUTPATIENT)
Dept: RESEARCH | Facility: CLINIC | Age: 57
End: 2022-11-04
Payer: COMMERCIAL

## 2022-11-04 DIAGNOSIS — Z00.6 EXAMINATION OF PARTICIPANT IN CLINICAL TRIAL: Primary | ICD-10-CM

## 2022-11-04 NOTE — PROGRESS NOTES
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  IRB#[id number]    ICF Version Date 9/13/2022 / IRB Approval Date: 10/21/2022     Date of Re-Consent : 10/28/2022    The subject was re-screened and meets all of the inclusion criteria and none of the exclusion criteria is met. This subject was previously mailed a consent form and contacted by the research team. The subject verbally confirmed interest over the phone and mailed back a signed HIPAA form and an informed consent form. This note is documenting that the study team has received the re-consent enrollment forms and that the patient was contacted by the study team to confirmed enrollment over the phone.     The subject was told:  -that the study involves research   -the purpose of the research study  -the expected duration of the study and the approximate number of subject sought  -of procedures that are identified as experimental  -of reasonably foreseeable risks or discomforts to the subject  -of any benefits to the subject or others that may be expected from the research  -of alternative procedures and/or treatment  -how the confidentiality of records would be maintained  -whether or not compensation and medical treatments are available should injury occur as a result of the study  -who to contact if they have questions related to the research study or questions regarding research subjects' rights  -that participation is completely voluntary and that their decision to or not to participate will have no impact on their relationships with the [facility] and the staff    No study procedures were completed prior to the consent being obtained.  The use of historical information (lab or assessments) used for the purpose of the study was approved by subject.  The subject was fully aware that we would be reviewing their medical record for the study.  The subject demonstrated an  understanding of what the study involved.  Specifically, how this study differed from standard of care at our center and what was required of the subject as part of the study.  The subject reviewed the consent form and was given the opportunity to ask questions before signing.  Questions and concerns were answered by the study staff and/or study physician.  A copy of the signed informed consent document was offered to the subject:  [] Yes [x] No     The consent require the use of a :   [] Yes [x]   No                            A 'short form' consent was used:     [] Yes [x]  No                                                                                   If yes, provide name of witness:   The subject required a legally-authorized representative (LAR) to sign on their behalf:                                                    [] Yes    [x] No                 If yes, please record name of LAR:     Questions to Evaluate Subject Comprehension of Study:     Question: Adequate Response? If No, explain what actions were taken   What is being studied? [x] Yes   [] No     If you participate, what will be different than if you decide not to participate?  [x] Yes  [] No    How long will the study last; will you be required to return for visits? [x] Yes  [] No    What kinds of risks are there? [x] Yes  [] No        Do you understand that you can withdraw consent at any time and for any reason while participating in the study? [x] Yes  [] No       Study Coordinators:  Catrina Melendez [phone] [email or web address]      :      Michael Vargas       [phone]    [email or web address]  PI:  [email or web address]

## 2022-11-07 ENCOUNTER — ANTICOAGULATION THERAPY VISIT (OUTPATIENT)
Dept: ANTICOAGULATION | Facility: CLINIC | Age: 57
End: 2022-11-07

## 2022-11-07 DIAGNOSIS — I48.91 ATRIAL FIBRILLATION, UNSPECIFIED TYPE (H): Primary | ICD-10-CM

## 2022-11-07 NOTE — PROGRESS NOTES
ANTICOAGULATION MANAGEMENT     Edson Thornton 57 year old male is on warfarin with therapeutic INR result. (Goal INR 2.0-3.0)    Recent labs: (last 7 days)     11/04/22  1015   INR 2.86*       ASSESSMENT       Source(s): Patient/Caregiver Call       Warfarin doses taken: Warfarin taken as instructed    Diet: No new diet changes identified    New illness, injury, or hospitalization: No    Medication/supplement changes: None noted    Signs or symptoms of bleeding or clotting: No    Previous INR: Therapeutic last 2(+) visits    Additional findings: None       PLAN     Recommended plan for no diet, medication or health factor changes affecting INR     Dosing Instructions: Continue your current warfarin dose with next INR in 10 days       Summary  As of 11/7/2022    Full warfarin instructions:  5 mg every Tue, Fri; 4 mg all other days; Starting 11/7/2022   Next INR check:  11/14/2022             Telephone call with Bret who verbalizes understanding and agrees to plan and who agrees to plan and repeated back plan correctly    Lab visit scheduled    Education provided:     None required    Plan made per [facility] anticoagulation protocol    Suni Choi RN  Anticoagulation Clinic  11/7/2022    _______________________________________________________________________     Anticoagulation Episode Summary     Current INR goal:  2.0-3.0   TTR:  53.9 % (10 mo)   Target end date:  Indefinite   Send INR reminders to:  [facility]    Indications    Atrial fibrillation  unspecified type (H) [I48.91]           Comments:  Zay (p) [phone]  (f) [phone]         Anticoagulation Care Providers     Provider Role Specialty Phone number    Abiodun Woods MD Referring Pulmonary Disease [phone]

## 2022-11-10 ENCOUNTER — ANTICOAGULATION THERAPY VISIT (OUTPATIENT)
Dept: ANTICOAGULATION | Facility: CLINIC | Age: 57
End: 2022-11-10

## 2022-11-10 DIAGNOSIS — I48.91 ATRIAL FIBRILLATION, UNSPECIFIED TYPE (H): Primary | ICD-10-CM

## 2022-11-11 NOTE — PROGRESS NOTES
"Transplant Coordinator Note    Reason for visit: Post lung transplant follow up visit   Coordinator: Present   Caregiver:  SO    Health concerns addressed today:  1. Respiratory - feeling well. Not getting winded with activities. Able to walk 0.25miles before getting winded.   2. Sternal/Chest pain - sometimes with pressure, sometimes hurts when breathing, hurts to breathe out. Fall April/May, braced fall with hand, \"knocked the wind out of me\", sternal pain since then.   3. GI: good appetite, occasional nausea - resolves on own. Occasional diarrhea - resolves on own.   4. Night sweats - at baseline.   5. ENT: runny nose, clear, at baseline.   6. 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 Home -120/60-70s.     Activity/rehab: up ad [place], walking 4 blocks most days of the week  Oxygen needs: room air  Pain management/RX: joint/bone pain (wrist and fingers), sees rheumatology next week. Some swelling. Using Volteran Gel.   Diabetic management: on lantus, occasional novolog  Next Bronch due: 9/13/20222  CMV status: D-/R-  EBV status: D+/R+  AC/asa: on coumadin, bridged to Lovenox for bronch (lower dose per CrCl d/t bleeding post bronch x 2)  PJP prophylactic: Bactrim     COVID:  1. COVID-19 infection (yes/no, date of most recent positive test): no  2. Status/instructions given about COVID-19 vaccine: Has received full dose of Evusheld 1/7/2022 and 4/18/22. Received Evusheld 10/18/2022 locally. Due 2/2023 for the new COVID vaccine booster     Labs, CXR, PFTs reviewed with patient  Medication record reviewed and reconciled  Questions and concerns addressed    Vaccines - patient will get flu vaccine today (11/14/2022).   Future vaccines, plan for Prevenar 20 in 1 month (December 2022). Patient should get Hepatitis B vaccine series after getting Prevenar.     Patient Instructions  1. Continue to hydrate with 60-70 oz fluids daily.  2. Continue to exercise daily or most days of the week.  3. Follow up with your " primary care provider for annual gender health maintenance procedures.  4. Follow up with colonoscopy schedule.  5. Follow up with annual dermatology visits.  6. It doesn't seem like the COVID vaccine is working well in lung transplant patients. A number of lung transplant patients have gotten sick with COVID even after receiving the vaccines.  Based on our recent experience, it can be life-threatening to get COVID  even after being vaccinated. Please continue to act like you did not get the COVID vaccine - social distancing, wearing a mask, good hand hygiene, etc. If the people around you are vaccinated, it will help reduce the risk of you getting COVID. All members of your household should be vaccinated.  7. Decrease your Calcium Carbonate to once a day.   8. Try using some Volteran gel on your sternal incision.   9. We will have you seen an endocrinologist for your bones next time you're in town.   10. We are going to have you start Advair 1 puff twice a day. You should make sure to swish our your mouth afterwards. You can watch videos on YouTube on how to use the inhaler.   11. You should get the COVID booster in February, 2023.     Next transplant clinic appointment: 1 month with CXR, labs and PFTs  Next lab draw: pending tacrolimus level, otherwise in 2 weeks.       AVS printed at time of check out

## 2022-11-14 ENCOUNTER — ANCILLARY PROCEDURE (OUTPATIENT)
Dept: BONE DENSITY | Facility: CLINIC | Age: 57
End: 2022-11-14
Attending: INTERNAL MEDICINE
Payer: COMMERCIAL

## 2022-11-14 ENCOUNTER — OFFICE VISIT (OUTPATIENT)
Dept: TRANSPLANT | Facility: CLINIC | Age: 57
End: 2022-11-14
Attending: INTERNAL MEDICINE
Payer: COMMERCIAL

## 2022-11-14 ENCOUNTER — ANTICOAGULATION THERAPY VISIT (OUTPATIENT)
Dept: ANTICOAGULATION | Facility: CLINIC | Age: 57
End: 2022-11-14

## 2022-11-14 ENCOUNTER — LAB (OUTPATIENT)
Dept: LAB | Facility: CLINIC | Age: 57
End: 2022-11-14
Payer: COMMERCIAL

## 2022-11-14 ENCOUNTER — ANCILLARY PROCEDURE (OUTPATIENT)
Dept: GENERAL RADIOLOGY | Facility: CLINIC | Age: 57
End: 2022-11-14
Attending: INTERNAL MEDICINE
Payer: COMMERCIAL

## 2022-11-14 VITALS — HEART RATE: 64 BPM | OXYGEN SATURATION: 95 % | SYSTOLIC BLOOD PRESSURE: 146 MMHG | DIASTOLIC BLOOD PRESSURE: 81 MMHG

## 2022-11-14 DIAGNOSIS — E83.42 HYPOMAGNESEMIA: ICD-10-CM

## 2022-11-14 DIAGNOSIS — D84.9 IMMUNOSUPPRESSED STATUS (H): ICD-10-CM

## 2022-11-14 DIAGNOSIS — Z94.2 S/P LUNG TRANSPLANT (H): ICD-10-CM

## 2022-11-14 DIAGNOSIS — Z94.2 S/P LUNG TRANSPLANT (H): Chronic | ICD-10-CM

## 2022-11-14 DIAGNOSIS — E83.52 HYPERCALCEMIA: ICD-10-CM

## 2022-11-14 DIAGNOSIS — Z79.52 CURRENT CHRONIC USE OF SYSTEMIC STEROIDS: ICD-10-CM

## 2022-11-14 DIAGNOSIS — Z79.899 ENCOUNTER FOR LONG-TERM (CURRENT) USE OF HIGH-RISK MEDICATION: ICD-10-CM

## 2022-11-14 DIAGNOSIS — D84.9 IMMUNOSUPPRESSED STATUS (H): Primary | ICD-10-CM

## 2022-11-14 DIAGNOSIS — M81.0 OSTEOPOROSIS: ICD-10-CM

## 2022-11-14 DIAGNOSIS — N18.31 STAGE 3A CHRONIC KIDNEY DISEASE (H): ICD-10-CM

## 2022-11-14 DIAGNOSIS — I48.91 ATRIAL FIBRILLATION, UNSPECIFIED TYPE (H): Primary | ICD-10-CM

## 2022-11-14 DIAGNOSIS — E83.52 HYPERCALCEMIA: Primary | ICD-10-CM

## 2022-11-14 LAB
6 MIN WALK (FT): 1215 FT
6 MIN WALK (M): 370 M
ALBUMIN SERPL BCG-MCNC: 4.6 G/DL (ref 3.5–5.2)
ALP SERPL-CCNC: 101 U/L (ref 40–129)
ALT SERPL W P-5'-P-CCNC: 26 U/L (ref 10–50)
ANION GAP SERPL CALCULATED.3IONS-SCNC: 10 MMOL/L (ref 7–15)
AST SERPL W P-5'-P-CCNC: 23 U/L (ref 10–50)
BILIRUB SERPL-MCNC: 0.3 MG/DL
BUN SERPL-MCNC: 22.8 MG/DL (ref 6–20)
CALCIUM SERPL-MCNC: 10.1 MG/DL (ref 8.6–10)
CHLORIDE SERPL-SCNC: 106 MMOL/L (ref 98–107)
CHOLEST SERPL-MCNC: 157 MG/DL
CMV DNA SPEC NAA+PROBE-ACNC: NOT DETECTED IU/ML
CREAT SERPL-MCNC: 1.65 MG/DL (ref 0.67–1.17)
DEPRECATED CALCIDIOL+CALCIFEROL SERPL-MC: 45 UG/L (ref 20–75)
DEPRECATED HCO3 PLAS-SCNC: 29 MMOL/L (ref 22–29)
DLCOCOR-%PRED-PRE: 60 %
DLCOCOR-PRE: 14.21 ML/MIN/MMHG
DLCOUNC-%PRED-PRE: 53 %
DLCOUNC-PRE: 12.63 ML/MIN/MMHG
DLCOUNC-PRED: 23.61 ML/MIN/MMHG
ERV-%PRED-PRE: 38 %
ERV-PRE: 0.32 L
ERV-PRED: 0.84 L
EXPTIME-PRE: 11.16 SEC
FEF2575-%PRED-POST: 39 %
FEF2575-%PRED-PRE: 20 %
FEF2575-POST: 1.09 L/SEC
FEF2575-PRE: 0.56 L/SEC
FEF2575-PRED: 2.76 L/SEC
FEFMAX-%PRED-PRE: 47 %
FEFMAX-PRE: 3.93 L/SEC
FEFMAX-PRED: 8.24 L/SEC
FEV1-%PRED-PRE: 39 %
FEV1-PRE: 1.22 L
FEV1FEV6-PRE: 60 %
FEV1FEV6-PRED: 79 %
FEV1FVC-PRE: 57 %
FEV1FVC-PRED: 79 %
FEV1SVC-PRE: 50 %
FEV1SVC-PRED: 75 %
FIFMAX-PRE: 3.91 L/SEC
FRCPLETH-%PRED-PRE: 77 %
FRCPLETH-PRE: 2.54 L
FRCPLETH-PRED: 3.26 L
FVC-%PRED-PRE: 54 %
FVC-PRE: 2.12 L
FVC-PRED: 3.9 L
GFR SERPL CREATININE-BSD FRML MDRD: 48 ML/MIN/1.73M2
GLUCOSE SERPL-MCNC: 108 MG/DL (ref 70–99)
HBA1C MFR BLD: 6.1 %
HDLC SERPL-MCNC: 46 MG/DL
IC-%PRED-PRE: 65 %
IC-PRE: 2.13 L
IC-PRED: 3.26 L
INR PPP: 2.24 (ref 0.85–1.15)
LDLC SERPL CALC-MCNC: 69 MG/DL
MAGNESIUM SERPL-MCNC: 1.6 MG/DL (ref 1.7–2.3)
NONHDLC SERPL-MCNC: 111 MG/DL
PHOSPHATE SERPL-MCNC: 3.7 MG/DL (ref 2.5–4.5)
POTASSIUM SERPL-SCNC: 4.4 MMOL/L (ref 3.4–5.3)
PROT SERPL-MCNC: 6.8 G/DL (ref 6.4–8.3)
PTH-INTACT SERPL-MCNC: 72 PG/ML (ref 15–65)
RVPLETH-%PRED-PRE: 102 %
RVPLETH-PRE: 2.21 L
RVPLETH-PRED: 2.17 L
SODIUM SERPL-SCNC: 145 MMOL/L (ref 136–145)
TLCPLETH-%PRED-PRE: 77 %
TLCPLETH-PRE: 4.66 L
TLCPLETH-PRED: 6.01 L
TRIGL SERPL-MCNC: 211 MG/DL
VA-%PRED-PRE: 63 %
VA-PRE: 3.42 L
VC-%PRED-PRE: 59 %
VC-PRE: 2.45 L
VC-PRED: 4.1 L

## 2022-11-14 PROCEDURE — 90682 RIV4 VACC RECOMBINANT DNA IM: CPT | Performed by: INTERNAL MEDICINE

## 2022-11-14 PROCEDURE — 84403 ASSAY OF TOTAL TESTOSTERONE: CPT | Mod: 90 | Performed by: PATHOLOGY

## 2022-11-14 PROCEDURE — 86832 HLA CLASS I HIGH DEFIN QUAL: CPT | Performed by: PATHOLOGY

## 2022-11-14 PROCEDURE — 83970 ASSAY OF PARATHORMONE: CPT | Mod: 90 | Performed by: PATHOLOGY

## 2022-11-14 PROCEDURE — 36415 COLL VENOUS BLD VENIPUNCTURE: CPT | Performed by: PATHOLOGY

## 2022-11-14 PROCEDURE — 94618 PULMONARY STRESS TESTING: CPT | Performed by: INTERNAL MEDICINE

## 2022-11-14 PROCEDURE — 250N000011 HC RX IP 250 OP 636: Performed by: INTERNAL MEDICINE

## 2022-11-14 PROCEDURE — G0008 ADMIN INFLUENZA VIRUS VAC: HCPCS | Performed by: INTERNAL MEDICINE

## 2022-11-14 PROCEDURE — 77080 DXA BONE DENSITY AXIAL: CPT | Performed by: INTERNAL MEDICINE

## 2022-11-14 PROCEDURE — 85610 PROTHROMBIN TIME: CPT | Performed by: PATHOLOGY

## 2022-11-14 PROCEDURE — 99000 SPECIMEN HANDLING OFFICE-LAB: CPT | Performed by: PATHOLOGY

## 2022-11-14 PROCEDURE — 87799 DETECT AGENT NOS DNA QUANT: CPT | Mod: 90 | Performed by: PATHOLOGY

## 2022-11-14 PROCEDURE — 80061 LIPID PANEL: CPT | Performed by: PATHOLOGY

## 2022-11-14 PROCEDURE — 84100 ASSAY OF PHOSPHORUS: CPT | Performed by: PATHOLOGY

## 2022-11-14 PROCEDURE — 94729 DIFFUSING CAPACITY: CPT | Performed by: INTERNAL MEDICINE

## 2022-11-14 PROCEDURE — 83036 HEMOGLOBIN GLYCOSYLATED A1C: CPT | Mod: 90 | Performed by: PATHOLOGY

## 2022-11-14 PROCEDURE — 94726 PLETHYSMOGRAPHY LUNG VOLUMES: CPT | Performed by: INTERNAL MEDICINE

## 2022-11-14 PROCEDURE — 94060 EVALUATION OF WHEEZING: CPT | Performed by: INTERNAL MEDICINE

## 2022-11-14 PROCEDURE — 86833 HLA CLASS II HIGH DEFIN QUAL: CPT | Performed by: PATHOLOGY

## 2022-11-14 PROCEDURE — 71046 X-RAY EXAM CHEST 2 VIEWS: CPT | Mod: GC | Performed by: RADIOLOGY

## 2022-11-14 PROCEDURE — 99215 OFFICE O/P EST HI 40 MIN: CPT | Mod: 25 | Performed by: INTERNAL MEDICINE

## 2022-11-14 PROCEDURE — 80053 COMPREHEN METABOLIC PANEL: CPT | Performed by: PATHOLOGY

## 2022-11-14 PROCEDURE — 82306 VITAMIN D 25 HYDROXY: CPT | Mod: 90 | Performed by: PATHOLOGY

## 2022-11-14 PROCEDURE — 83735 ASSAY OF MAGNESIUM: CPT | Performed by: PATHOLOGY

## 2022-11-14 RX ORDER — MAGNESIUM GLYCINATE 15 %
300 POWDER (GRAM) MISCELLANEOUS 3 TIMES DAILY
Qty: 18 G | Refills: 11 | COMMUNITY
Start: 2022-11-14 | End: 2023-03-28

## 2022-11-14 RX ORDER — DULOXETIN HYDROCHLORIDE 30 MG/1
90 CAPSULE, DELAYED RELEASE ORAL DAILY
Status: ON HOLD | COMMUNITY
Start: 2022-11-14 | End: 2023-03-08

## 2022-11-14 RX ORDER — FLUTICASONE PROPIONATE AND SALMETEROL 500; 50 UG/1; UG/1
1 POWDER RESPIRATORY (INHALATION) EVERY 12 HOURS
Qty: 60 EACH | Refills: 11 | Status: SHIPPED | OUTPATIENT
Start: 2022-11-14 | End: 2023-08-09

## 2022-11-14 RX ADMIN — INFLUENZA A VIRUS A/WISCONSIN/588/2019 (H1N1) RECOMBINANT HEMAGGLUTININ ANTIGEN, INFLUENZA A VIRUS A/DARWIN/6/2021 (H3N2) RECOMBINANT HEMAGGLUTININ ANTIGEN, INFLUENZA B VIRUS B/AUSTRIA/1359417/2021 RECOMBINANT HEMAGGLUTININ ANTIGEN, AND INFLUENZA B VIRUS B/PHUKET/3073/2013 RECOMBINANT HEMAGGLUTININ ANTIGEN 0.5 ML: 45; 45; 45; 45 INJECTION INTRAMUSCULAR at 10:32

## 2022-11-14 NOTE — PATIENT INSTRUCTIONS
Patient Instructions  1. Continue to hydrate with 60-70 oz fluids daily.  2. Continue to exercise daily or most days of the week.  3. Follow up with your primary care provider for annual gender health maintenance procedures.  4. Follow up with colonoscopy schedule.  5. Follow up with annual dermatology visits.  6. It doesn't seem like the COVID vaccine is working well in lung transplant patients. A number of lung transplant patients have gotten sick with COVID even after receiving the vaccines.  Based on our recent experience, it can be life-threatening to get COVID  even after being vaccinated. Please continue to act like you did not get the COVID vaccine - social distancing, wearing a mask, good hand hygiene, etc. If the people around you are vaccinated, it will help reduce the risk of you getting COVID. All members of your household should be vaccinated.  7. Decrease your Calcium Carbonate to once a day.   8. Try using some Volteran gel on your sternal incision.   9. We will have you seen an endocrinologist for your bones next time you're in town.   10. We are going to have you start Advair 1 puff twice a day. You should make sure to swish our your mouth afterwards. You can watch videos on YouTube on how to use the inhaler.     Next transplant clinic appointment: 1 month with CXR, labs and PFTs  Next lab draw: pending tacrolimus level, otherwise in 2 weeks.         ~~~~~~~~~~~~~~~~~~~~~~~~~    Thoracic Transplant Office phone [phone], fax [phone]    Office Hours 8:30 - 5:00     For after-hours urgent issues, please dial [phone], and ask to speak with the Thoracic Transplant Coordinator On-Call.  --------------------  To expedite your medication refill(s), please contact your pharmacy and have them fax a refill request to: [phone]  .   *Please allow 3 business days for routine medication refills.  *Please allow 5 business days for controlled substance medication refills.    **For Diabetic  medications and supplies refill(s), please contact your pharmacy and have them contact your Endocrine team.  --------------------  For scheduling appointments call [phone].  --------------------  Please Note: If you are active on [employer], all future test results will be sent by [employer] message only, and will no longer be called to patient. You may also receive communication directly from your physician.

## 2022-11-14 NOTE — PROGRESS NOTES
ANTICOAGULATION MANAGEMENT     Edson Thornton 57 year old male is on warfarin with therapeutic INR result. (Goal INR 2.0-3.0)    Recent labs: (last 7 days)     11/14/22  0726   INR 2.24*       ASSESSMENT       Source(s): Chart Review       Warfarin doses taken: Reviewed in chart    Diet: No new diet changes identified    New illness, injury, or hospitalization: No    Medication/supplement changes: None noted    Signs or symptoms of bleeding or clotting: No    Previous INR: Therapeutic last 2(+) visits    Additional findings: None       PLAN     Recommended plan for no diet, medication or health factor changes affecting INR     Dosing Instructions: Continue your current warfarin dose with next INR in 1 week       Summary  As of 11/14/2022    Full warfarin instructions:  5 mg every Tue, Fri; 4 mg all other days; Starting 11/14/2022   Next INR check:  11/21/2022             Telephone call with Bret who verbalizes understanding and agrees to plan and who agrees to plan and repeated back plan correctly    Patient using outside facility for labs    Education provided:     Please call back if any changes to your diet, medications or how you've been taking warfarin    Contact [phone] with any changes, questions or concerns.     Plan made per [facility] anticoagulation protocol    Terrie Cedillo RN  Anticoagulation Clinic  11/14/2022    _______________________________________________________________________     Anticoagulation Episode Summary     Current INR goal:  2.0-3.0   TTR:  55.4 % (10.3 mo)   Target end date:  Indefinite   Send INR reminders to:  [facility]    Indications    Atrial fibrillation  unspecified type (H) [I48.91]           Comments:  Zay (p) [phone]  (f) [phone]         Anticoagulation Care Providers     Provider Role Specialty Phone number    Abiodun Woods MD Referring Pulmonary Disease [phone]

## 2022-11-14 NOTE — PROGRESS NOTES
Reason for Visit  Edson Thornton is a 57 year old year old male who is being seen for No chief complaint on file.      Assessment and plan:   Edson Thornton is a 57 year old male with NSIP/ILD, bronchiectasis, moderate PH, RA, SALTY, chronic HSV infection, hypogammaglobulinemia, steroid-induced diabetes, hypothyroidism, PFO, HTN, HLD, duodenal anomaly, anxiety, and depression. Admitted on 9/5/21 from OSH for acute on chronic respiratory failure 2/2 ILD exacerbation now s/p BSLT on 10/16/21. Prolonged vent wean s/p trach and PEG/J tube.  Post-op course also complicated by encephalopathy and diffuse weakness, acute to subacute CVA, afib with RVR, BRENNAN, GI bleed, Candidemia/Candida empyema, and anxiety. Code called 11/2 for bradycardia/asystole, progressive hypotension, found to have significant GI bleed, EGD with adherent clot near PEG tube site that was clipped.  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    Pulmonary/lung transplant: Dyspnea with mild to moderate exertion, unchanged in the recent past.  Minimal cough or sputum.  Chest x-ray with bilateral lower lung atelectasis, unchanged from previous.  On 6-minute walk, the patient had mild desaturation but does not require supplemental oxygen.  PFTs are significantly decreased from previous but interestingly had an excellent bronchodilator response.  The etiology and significance of this finding is unclear but based on this change, I have recommended a trial of Advair 1 puff twice daily.  It is possible this represents early SHAYLA but given the bronchodilator response trial of bronchodilators will be initiated first.  PFTs have always been below expected.  A previous sniff test revealed normal diaphragm function.  The patient only had 2 surveillance bronchoscopies but neither showed acute rejection.  Due to bleeding complications,  no further biopsies been pursued.:  This will be adjusted to maintain a level of 8-10, reduced from previous as the patient has reached the 1 year cr.  Continue current Myfortic and prednisone.    Positive crossmatch: The patient had a retrospective positive crossmatch following transplantation, attributed to rituximab received in June.  Subsequent DSA is positive, waxing and waning but consistently below the MFI threshold for treatment.   Date DSA mfi Biopsy (date) Treatment   10/17/2021  none         10/23/2021  none         11/1/2021  none         11/15/2021  none         11/29/2021  DQ B5  2522       12/6/2021  DQ B5  1873       12/12/2021  DQ B5  1703       12/27/2021  DQ B5  3924       1/3/2022  DQ B5  3072       1/10/2022  DQ B5  4032       1/17/2022  DQB5  1849       2/3/2022 DQB5   2488       2/7/2022 DQB5  1874       2/10/2022 DQB5  1781       3/15/2022 DQB5  2254       3/21/2022 DQB5  2117       4/18/2022 DQB5   908       6/20/2022  DQB5  1100       6/29/2022  DQB5  1411       9/12/2022 DQB5  1681     11/14/2022 DQB5  891                        Prophylaxis: The patient is CMV -/-.  Continue CMV monitoring.  Continue Bactrim for PJP and nocardia prophylaxis.    Ophthalmology: Double vision/blind spots, defer to ophthalmology follow-up.    Rheumatoid arthritis: Hand joint pain and stiffness moderately well controlled with current immunosuppression.    Atrial fibrillation with RVR: The patient appears to be in sinus rhythm by exam today.  Continue propranolol.    Disseminated Candida: The patient has completed a course of fluconazole.    Recurrent HSV: Resolved.  Will require chronic suppressive therapy if recurrence.    Hypogammaglobulinemia: Continue monitoring.  Replacement only if low and recurrent infections.    Obstructive sleep apnea: Continue CPAP.  May need follow-up sleep study.    Hypomagnesemia: Magnesium level is low but the patient is already on a large amount of replacement.  Continue  monitoring and consider IV replacement if progressive decline.    Hypertension: Blood pressure is mildly elevated in clinic but patient reports it is well controlled at home.  Typically 117-128/72-84 continue amlodipine, propranolol and tamsulosin.    Depression/anxiety: Adequately controlled with current duloxetine.    History of GI bleed: No evidence of recurrence.  Continue pantoprazole.    Steroid-induced hyperglycemia: Blood sugar adequately controlled with current insulin regimen.    Hypothyroidism: Continue current levothyroxine.    Multiple acute/subacute ischemic strokes: etiology likely embolic vs perioperative. MRI brain 10/23 with infarcts noted in both cerebral hemispheres, left cerebellum, presumed associated with new Afib vs perioperative. Bilateral upper extremity paresis (R>L), suspicion for some cortical blindness. SBP goal < 140. Continue warfarin, HTN and BS control and rosuvastatin.    Hypercalcemia: PTH was checked after the visit and found to be elevated.  This is probably consistent with primary hyperparathyroidism.  Calcium will be reduced to once daily.  We will pursue endocrine consult.    CKD: The patient has stage IIIa CKD.,  School was reduced as noted above.  The patient should otherwise maintain adequate hydration and avoid other nephrotoxins.    Reduced bone density: DEXA scan with reduced bone density.  There are no previous scans for comparison.  Since the patient had his transplant evaluation urgently during hospital admission, it did not include a bone density scan.  Endocrine evaluation will be arranged with his follow-up visit.    Chest wall pain: Trial of Voltaren gel.    Healthcare maintenance: Influenza vaccine today.  The patient's transplant evaluation was performed urgently so he did not receive Prevnar or the hepatitis series.  Prevnar 20 will be administered next visit as well as the first of the hepatitis series    I, Abiodun Woods, have spent 43 minutes on the day of  "the visit to review the chart, interview and examine the patient, review labs and imaging, formulate a plan and submit orders. Time documented is excluding time spent for PFT interpretation.       Lung TX HPI  Transplants:  10/16/2021 (Lung), Postoperative day:  394    The patient was seen and examined by Abiodun Woods MD   Edson Thornton is a 57 year old male with NSIP/ILD, bronchiectasis, moderate PH, RA, SALTY, chronic HSV infection, hypogammaglobulinemia, steroid-induced diabetes, hypothyroidism, PFO, HTN, HLD, duodenal anomaly, anxiety, and depression. Admitted on 9/5/21 from OSH for acute on chronic respiratory failure 2/2 ILD exacerbation now s/p BSLT on 10/16/21. Prolonged vent wean s/p trach and PEG/J tube.  Post-op course also complicated by encephalopathy and diffuse weakness, acute to subacute CVA, afib with RVR, BRENNAN, GI bleed, Candidemia/Candida empyema, and anxiety. Code called 11/2 for bradycardia/asystole, progressive hypotension, found to have significant GI bleed, EGD with adherent clot near PEG tube site that was clipped.  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    No illnesses since the last visit.  Dyspnea with approximately 1/4 mile on flat ground.  The patient goes walking regularly.  Occasional cough which is unchanged.  Occasional clear-yellow sputum.  No hemoptysis.  Ongoing pain at the sternum and left chest with some chest wall tenderness and pain with exhalation.  Of note the patient [place] the [place] on his hands, \"knocked the wind out of himself\" in his chest has been sore since that time.  No fever or chills.  Chronic night sweats which are unchanged.    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    Review of systems:  Appetite is okay  Clear rhinorrhea  Occasional nausea, self-limited. "  No vomiting.  No abdominal pain.  Occasional loose stool.  Ongoing stiffness/pain in the fingers attributed to rheumatoid arthritis  Easy bruising  A complete ROS was otherwise negative except as noted in the HPI.    Current Outpatient Medications   Medication     acetaminophen (TYLENOL) 325 MG tablet     amLODIPine (NORVASC) 10 MG tablet     calcium carbonate 600 mg-vitamin D 400 units (CALTRATE) 600-400 MG-UNIT per tablet     diclofenac (VOLTAREN) 1 % topical gel     DULoxetine (CYMBALTA) 30 MG capsule     fluticasone (FLONASE) 50 MCG/ACT nasal spray     insulin aspart (NOVOLOG PEN) 100 UNIT/ML pen     insulin glargine (LANTUS PEN) 100 UNIT/ML pen     insulin pen needle (32G X 4 MM) 32G X 4 MM miscellaneous     levalbuterol (XOPENEX HFA) 45 MCG/ACT inhaler     levothyroxine (SYNTHROID/LEVOTHROID) 25 MCG tablet     Magnesium Glycinate POWD     Melatonin 10 MG TABS tablet     Multiple Vitamin (MULTIVITAMIN ADULT PO)     multivitamin w/minerals (THERA-VIT-M) tablet     MYFORTIC (BRAND) 360 MG EC tablet     ondansetron (ZOFRAN-ODT) 4 MG ODT tab     pantoprazole (PROTONIX) 40 MG EC tablet     predniSONE (DELTASONE) 2.5 MG tablet     predniSONE (DELTASONE) 5 MG tablet     propranolol (INDERAL) 20 MG tablet     rOPINIRole (REQUIP) 0.25 MG tablet     rosuvastatin (CRESTOR) 10 MG tablet     senna-docusate (SENOKOT-S/PERICOLACE) 8.6-50 MG tablet     sulfamethoxazole-trimethoprim (BACTRIM) 400-80 MG tablet     tacrolimus (GENERIC EQUIVALENT) 1 MG capsule     tamsulosin (FLOMAX) 0.4 MG capsule     warfarin ANTICOAGULANT (COUMADIN) 1 MG tablet     warfarin ANTICOAGULANT (COUMADIN) 2 MG tablet     zolpidem (AMBIEN) 10 MG tablet     No current facility-administered medications for this visit.     No Known Allergies  Past Medical History:   Diagnosis Date     BRENNAN (acute kidney injury) (H) 10/17/2021     Anxiety      Depression      HLD (hyperlipidemia)      HTN (hypertension)      Hypothyroidism      ILD (interstitial lung  disease) (H)      SALTY on CPAP      Oxygen dependent     BL 4L since ~6/2021     Primary hypertension 2/28/2022     Rheumatoid arthritis (H)     signs ~5/2020, dx 5/2021     S/P lung transplant (H) 10/16/2021     Shock liver 10/17/2021     Steroid-induced hyperglycemia      Traction bronchiectasis (H)        Past Surgical History:   Procedure Laterality Date     BRONCHOSCOPY (RIGID OR FLEXIBLE), DIAGNOSTIC N/A 1/26/2022    Procedure: BRONCHOSCOPY, WITH BRONCHOALVEOLAR LAVAGE AND BIOPSY;  Surgeon: Perlman, David Morris, MD;  Location:  GI     BRONCHOSCOPY (RIGID OR FLEXIBLE), DIAGNOSTIC N/A 4/19/2022    Procedure: BRONCHOSCOPY, WITH BRONCHOALVEOLAR LAVAGE AND BIOPSY;  Surgeon: Sherine Merrill MD;  Location:  GI     BRONCHOSCOPY (RIGID OR FLEXIBLE), DIAGNOSTIC N/A 6/21/2022    Procedure: BRONCHOSCOPY, WITH BRONCHOALVEOLAR LAVAGE AND BIOPSY;  Surgeon: Aneesh Rubio MD;  Location:  GI     COLONOSCOPY W/ BIOPSIES AND POLYPECTOMY  07/21/2020     CV CORONARY ANGIOGRAM N/A 09/08/2021    Procedure: Coronary Angiogram with possible intervention;  Surgeon: Jovon Bullock MD;  Location:  HEART CARDIAC CATH LAB     CV RIGHT HEART CATH MEASUREMENTS RECORDED N/A 09/08/2021    Procedure: Right Heart Cath;  Surgeon: Jovon Bullock MD;  Location:  HEART CARDIAC CATH LAB     ESOPHAGOSCOPY, GASTROSCOPY, DUODENOSCOPY (EGD), COMBINED N/A 10/23/2021    Procedure: ESOPHAGOGASTRODUODENOSCOPY (EGD);  Surgeon: Miquel Pisano MD;  Location:  GI     ESOPHAGOSCOPY, GASTROSCOPY, DUODENOSCOPY (EGD), COMBINED N/A 11/02/2021    Procedure: ESOPHAGOGASTRODUODENOSCOPY (EGD);  Surgeon: Daniel Ortiz MD;  Location:  GI     ESOPHAGOSCOPY, GASTROSCOPY, DUODENOSCOPY (EGD), COMBINED N/A 11/5/2021    Procedure: ESOPHAGOGASTRODUODENOSCOPY (EGD);  Surgeon: Ronnell Hernandez MD;  Location:  GI     EXTRACTION(S) DENTAL N/A 09/22/2021    Procedure: EXTRACTION tooth #19;  Surgeon: Deepak Tobin DDS;   Location: [facility] OR     HERNIA REPAIR       IR CHEST TUBE PLACEMENT NON-TUNNELLED LEFT  11/03/2021     PICC TRIPLE LUMEN PLACEMENT Left 11/04/2021    5FR TL PICC. Right non occlusive thrombus subclavian vein.     REPLACE GASTROJEJUNOSTOMY TUBE, PERCUTANEOUS N/A 11/9/2021    Procedure: REPLACEMENT, GASTROJEJUNOSTOMY TUBE, PERCUTANEOUS;  Surgeon: Hernando Rodriguez MD;  Location: [facility] GI     right acl       TRACHEOSTOMY PERCUTANEOUS N/A 10/29/2021    Procedure: Percutaneous Tracheostomy,;  Surgeon: Celine Jenkins MD;  Location: [facility] OR     TRANSPLANT LUNG RECIPIENT SINGLE X2 Bilateral 10/16/2021    Procedure: TRANSPLANT, LUNG, RECIPIENT, BILATERAL, Bronchoscopy, on-pump perfusion, bilateral clamshell sternotomy;  Surgeon: Yanick Corral MD;  Location: [facility] OR     XR ACROMIOCLAVICULAR JOINT BILATERAL         Social History     Socioeconomic History     Marital status: Single     Spouse name: Not on file     Number of children: Not on file     Years of education: Not on file     Highest education level: Not on file   Occupational History     Not on file   Tobacco Use     Smoking status: Never     Smokeless tobacco: Never   Substance and Sexual Activity     Alcohol use: Never     Drug use: Never     Sexual activity: Not on file   Other Topics Concern     Parent/sibling w/ CABG, MI or angioplasty before 65F 55M? Not Asked   Social History Narrative     Not on file     Social Determinants of Health     Financial Resource Strain: Not on file   Food Insecurity: Not on file   Transportation Needs: Not on file   Physical Activity: Not on file   Stress: Not on file   Social Connections: Not on file   Intimate Partner Violence: Not on file   Housing Stability: Not on file         BP (!) 146/81   Pulse 64   SpO2 95%   There is no height or weight on file to calculate BMI.  Exam:   GENERAL APPEARANCE: Well developed, well nourished, alert, and in no apparent distress.  EYES: PERRL, EOMI  HENT: Nasal mucosa with no edema and  no hyperemia. No nasal polyps.  EARS: Canals clear, TMs normal  MOUTH: Oral mucosa is moist, without any lesions, no tonsillar enlargement, no oropharyngeal exudate.  NECK: supple, no masses, no thyromegaly.  LYMPHATICS: No significant axillary, cervical, or supraclavicular nodes.  RESP: normal percussion, diminished airflow at the bases, right greater than left.  No crackles. No rhonchi. No wheezes.  CV: Normal S1, S2, regular rhythm, normal rate. No murmur.  No rub. No gallop. No LE edema.   ABDOMEN:  Bowel sounds normal, soft, nontender, no HSM or masses.   MS: extremities normal.  (+) Clubbing. No cyanosis.  SKIN: no rash on limited exam  NEURO: Mentation intact, speech normal, normal strength and tone, normal gait and stance  PSYCH: mentation appears normal. and affect normal/bright  Results:   Latest Reference Range & Units 11/14/22 07:26   Sodium 136 - 145 mmol/L 145   Potassium 3.4 - 5.3 mmol/L 4.4   Chloride 98 - 107 mmol/L 106   Carbon Dioxide (CO2) 22 - 29 mmol/L 29   Urea Nitrogen 6.0 - 20.0 mg/dL 22.8 (H)   Creatinine 0.67 - 1.17 mg/dL 1.65 (H)   GFR Estimate >60 mL/min/1.73m2 48 (L)   Calcium 8.6 - 10.0 mg/dL 10.1 (H)   Anion Gap 7 - 15 mmol/L 10   Magnesium 1.7 - 2.3 mg/dL 1.6 (L)   Phosphorus 2.5 - 4.5 mg/dL 3.7   Albumin 3.5 - 5.2 g/dL 4.6   Protein Total 6.4 - 8.3 g/dL 6.8   Alkaline Phosphatase 40 - 129 U/L 101   ALT 10 - 50 U/L 26   AST 10 - 50 U/L 23   Bilirubin Total <=1.2 mg/dL 0.3   Cholesterol <200 mg/dL 157   Glucose 70 - 99 mg/dL 108 (H)   HDL Cholesterol >=40 mg/dL 46   Hemoglobin A1C <5.7 % 6.1 (H)   LDL Cholesterol Calculated <=100 mg/dL 69   Non HDL Cholesterol <130 mg/dL 111   Testosterone Total 240 - 950 ng/dL 232 (L)   Triglycerides <150 mg/dL 211 (H)   Vitamin D Deficiency screening 20 - 75 ug/L 45   (H): Data is abnormally high  (L): Data is abnormally low     Latest Reference Range & Units 11/14/22 07:26   Parathyroid Hormone Intact 15 - 65 pg/mL 72 (H)   (H): Data is abnormally  high       [facility] Reference Range & Units 11/14/22 08:41   FVC-Pred L 3.90 (P)   FVC-Pre L 2.12 (P)   FVC-%Pred-Pre % 54 (P)   FEV1-Pre L 1.22 (P)   FEV1-%Pred-Pre % 39 (P)   FEV1FVC-Pred % 79 (P)   FEV1FVC-Pre % 57 (P)   FEV1SVC-Pred % 75 (P)   FEV1SVC-Pre % 50 (P)   FEV1FEV6-Pred % 79 (P)   FEV1FEV6-Pre % 60 (P)   FEFMax-Pred L/sec 8.24 (P)   FEFMax-Pre L/sec 3.93 (P)   FEFMax-%Pred-Pre % 47 (P)   FEF2575-Pred L/sec 2.76 (P)   FEF2575-Pre L/sec 0.56 (P)   VWP3228-%Pred-Pre % 20 (P)   FIFMax-Pre L/sec 3.91 (P)   ExpTime-Pre sec 11.16 (P)   FRCPleth-Pred L 3.26 (P)   FRCPleth-Pre L 2.54 (P)   FRCPleth-%Pred-Pre % 77 (P)   RVPleth-Pred L 2.17 (P)   RVPleth-Pre L 2.21 (P)   RVPleth-%Pred-Pre % 102 (P)   TLCPleth-Pred L 6.01 (P)   TLCPleth-Pre L 4.66 (P)   TLCPleth-%Pred-Pre % 77 (P)   ERV-Pred L 0.84 (P)   ERV-Pre L 0.32 (P)   ERV-%Pred-Pre % 38 (P)   IC-Pred L 3.26 (P)   IC-Pre L 2.13 (P)   IC-%Pred-Pre % 65 (P)   VC-Pred L 4.10 (P)   VC-Pre L 2.45 (P)   VC-%Pred-Pre % 59 (P)   DLCOunc-Pred ml/min/mmHg 23.61 (P)   DLCOunc-Pre ml/min/mmHg 12.63 (P)   DLCOunc-%Pred-Pre % 53 (P)   DLCOcor-Pre ml/min/mmHg 14.21 (P)   DLCOcor-%Pred-Pre % 60 (P)   VA-Pre L 3.42 (P)   VA-%Pred-Pre % 63 (P)   (P): Preliminary                  Results as noted above.    PFT Interpretation:  Severe obstructive ventilatory defect.  Significant improvement with bronchodilators.  Decreased from previous.  Below recent best.   Valid Maneuver    Normal lung volumes.  Mildly reduced diffusing capacity.      On 6-minute walk testing, the patient walked 1215 feet (1350 feet lower limit of normal).  Oxygen saturation fell from 97% to 93% but then stabilized at 94%.  No previous studies for comparison.

## 2022-11-15 DIAGNOSIS — I48.91 ATRIAL FIBRILLATION, UNSPECIFIED TYPE (H): ICD-10-CM

## 2022-11-15 LAB
DONOR IDENTIFICATION: NORMAL
DQB5: 891
DSA COMMENTS: NORMAL
DSA PRESENT: YES
DSA TEST METHOD: NORMAL
EBV DNA # SPEC NAA+PROBE: NOT DETECTED COPIES/ML
ORGAN: NORMAL
SA 1 CELL: NORMAL
SA 1 TEST METHOD: NORMAL
SA 2 CELL: NORMAL
SA 2 TEST METHOD: NORMAL
SA1 HI RISK ABY: NORMAL
SA1 MOD RISK ABY: NORMAL
SA2 HI RISK ABY: NORMAL
SA2 MOD RISK ABY: NORMAL
UNACCEPTABLE ANTIGENS: NORMAL
UNOS CPRA: 26
ZZZSA 1  COMMENTS: NORMAL
ZZZSA 2 COMMENTS: NORMAL

## 2022-11-15 RX ORDER — WARFARIN SODIUM 2 MG/1
TABLET ORAL
Qty: 168 TABLET | Refills: 1 | Status: ON HOLD | OUTPATIENT
Start: 2022-11-15 | End: 2023-03-14

## 2022-11-19 LAB — TESTOST SERPL-MCNC: 232 NG/DL (ref 240–950)

## 2022-11-21 ENCOUNTER — ANTICOAGULATION THERAPY VISIT (OUTPATIENT)
Dept: ANTICOAGULATION | Facility: CLINIC | Age: 57
End: 2022-11-21

## 2022-11-21 DIAGNOSIS — I48.91 ATRIAL FIBRILLATION, UNSPECIFIED TYPE (H): Primary | ICD-10-CM

## 2022-11-21 NOTE — PROGRESS NOTES
ANTICOAGULATION MANAGEMENT     Edson Thornton 57 year old male is on warfarin with therapeutic INR result. (Goal INR 2.0-3.0)    Recent labs: (last 7 days)     11/18/22  0848   INR 2.39*       ASSESSMENT       Source(s): Chart Review and Patient/Caregiver Call       Warfarin doses taken: Warfarin taken as instructed    Diet: No new diet changes identified    New illness, injury, or hospitalization: No    Medication/supplement changes: None noted    Signs or symptoms of bleeding or clotting: No    Previous INR: Therapeutic last 2(+) visits    Additional findings: None       PLAN     Recommended plan for no diet, medication or health factor changes affecting INR     Dosing Instructions: Continue your current warfarin dose with next INR in 1 week       Summary  As of 11/21/2022    Full warfarin instructions:  5 mg every Tue, Fri; 4 mg all other days; Starting 11/21/2022   Next INR check:  11/28/2022             Telephone call with Bret who verbalizes understanding and agrees to plan    Patient using outside facility for labs    Education provided:     None required    Plan made per [facility] anticoagulation protocol    Dionne Lopez RN  Anticoagulation Clinic  11/21/2022    _______________________________________________________________________     Anticoagulation Episode Summary     Current INR goal:  2.0-3.0   TTR:  56.0 % (10.5 mo)   Target end date:  Indefinite   Send INR reminders to:  [facility]    Indications    Atrial fibrillation  unspecified type (H) [I48.91]           Comments:  Zay (p) [phone]  (f) [phone]         Anticoagulation Care Providers     Provider Role Specialty Phone number    Abiodun Woods MD Referring Pulmonary Disease [phone]

## 2022-11-28 ENCOUNTER — ANTICOAGULATION THERAPY VISIT (OUTPATIENT)
Dept: ANTICOAGULATION | Facility: CLINIC | Age: 57
End: 2022-11-28

## 2022-11-28 DIAGNOSIS — I48.91 ATRIAL FIBRILLATION, UNSPECIFIED TYPE (H): Primary | ICD-10-CM

## 2022-11-28 NOTE — PROGRESS NOTES
ANTICOAGULATION MANAGEMENT     Edson Thornton 57 year old male is on warfarin with therapeutic INR result. (Goal INR 2.0-3.0)    Recent labs: (last 7 days)     11/25/22  0848   INR 2.19*       ASSESSMENT       Source(s): Chart Review and Patient/Caregiver Call       Warfarin doses taken: Warfarin taken as instructed    Diet: No new diet changes identified    New illness, injury, or hospitalization: No    Medication/supplement changes: None noted    Signs or symptoms of bleeding or clotting: No    Previous INR: Therapeutic last 2(+) visits    Additional findings: None       PLAN     Recommended plan for no diet, medication or health factor changes affecting INR     Dosing Instructions: Continue your current warfarin dose with next INR in 1 week       Summary  As of 11/28/2022    Full warfarin instructions:  5 mg every Tue, Fri; 4 mg all other days; Starting 11/28/2022   Next INR check:  12/2/2022             Telephone call with Bret who verbalizes understanding and agrees to plan    Patient using outside facility for labs    Education provided:     Contact [phone] with any changes, questions or concerns.     Plan made per [facility] anticoagulation protocol    ESISE COLÓN RN  Anticoagulation Clinic  11/28/2022    _______________________________________________________________________     Anticoagulation Episode Summary     Current INR goal:  2.0-3.0   TTR:  57.0 % (10.7 mo)   Target end date:  Indefinite   Send INR reminders to:  [facility]    Indications    Atrial fibrillation  unspecified type (H) [I48.91]           Comments:  Zay (p) [phone]  (f) [phone]         Anticoagulation Care Providers     Provider Role Specialty Phone number    Abiodun Woods MD Referring Pulmonary Disease [phone]

## 2022-12-02 ENCOUNTER — LAB (OUTPATIENT)
Dept: LAB | Facility: CLINIC | Age: 57
End: 2022-12-02
Payer: COMMERCIAL

## 2022-12-02 DIAGNOSIS — N18.31 STAGE 3A CHRONIC KIDNEY DISEASE (H): ICD-10-CM

## 2022-12-02 DIAGNOSIS — D84.9 IMMUNOSUPPRESSED STATUS (H): ICD-10-CM

## 2022-12-02 DIAGNOSIS — E83.42 HYPOMAGNESEMIA: ICD-10-CM

## 2022-12-02 DIAGNOSIS — Z79.899 ENCOUNTER FOR LONG-TERM (CURRENT) USE OF HIGH-RISK MEDICATION: ICD-10-CM

## 2022-12-02 DIAGNOSIS — E83.52 HYPERCALCEMIA: ICD-10-CM

## 2022-12-02 DIAGNOSIS — Z94.2 S/P LUNG TRANSPLANT (H): ICD-10-CM

## 2022-12-02 PROCEDURE — 80197 ASSAY OF TACROLIMUS: CPT

## 2022-12-05 ENCOUNTER — ANTICOAGULATION THERAPY VISIT (OUTPATIENT)
Dept: ANTICOAGULATION | Facility: CLINIC | Age: 57
End: 2022-12-05

## 2022-12-05 DIAGNOSIS — I48.91 ATRIAL FIBRILLATION, UNSPECIFIED TYPE (H): Primary | ICD-10-CM

## 2022-12-05 NOTE — PROGRESS NOTES
ANTICOAGULATION MANAGEMENT     Edson Thornton 57 year old male is on warfarin with therapeutic INR result. (Goal INR 2.0-3.0)    Recent labs: (last 7 days)     12/02/22  0900   INR 2.06*       ASSESSMENT       Source(s): Patient/Caregiver Call       Warfarin doses taken: Warfarin taken as instructed    Diet: No new diet changes identified    New illness, injury, or hospitalization: No    Medication/supplement changes: None noted    Signs or symptoms of bleeding or clotting: No    Previous INR: Therapeutic last 2(+) visits    Additional findings: None       PLAN     Recommended plan for no diet, medication or health factor changes affecting INR     Dosing Instructions: Continue your current warfarin dose with next INR in 1 week       Summary  As of 12/5/2022    Full warfarin instructions:  5 mg every Tue, Fri; 4 mg all other days; Starting 12/5/2022   Next INR check:  12/9/2022             Telephone call with Bret who verbalizes understanding and agrees to plan and who agrees to plan and repeated back plan correctly    Patient using outside facility for labs    Education provided:     None required    Plan made per [facility] anticoagulation protocol    Suni Choi, RN  Anticoagulation Clinic  12/5/2022    _______________________________________________________________________     Anticoagulation Episode Summary     Current INR goal:  2.0-3.0   TTR:  57.9 % (10.9 mo)   Target end date:  Indefinite   Send INR reminders to:  [facility]    Indications    Atrial fibrillation  unspecified type (H) [I48.91]           Comments:  Zay (p) [phone]  (f) [phone]         Anticoagulation Care Providers     Provider Role Specialty Phone number    Abiodun Woods MD Referring Pulmonary Disease [phone]

## 2022-12-09 LAB
TACROLIMUS BLD-MCNC: 9 UG/L (ref 5–15)
TME LAST DOSE: NORMAL H
TME LAST DOSE: NORMAL H

## 2022-12-12 ENCOUNTER — ANTICOAGULATION THERAPY VISIT (OUTPATIENT)
Dept: ANTICOAGULATION | Facility: CLINIC | Age: 57
End: 2022-12-12

## 2022-12-12 DIAGNOSIS — I48.91 ATRIAL FIBRILLATION, UNSPECIFIED TYPE (H): Primary | ICD-10-CM

## 2022-12-12 NOTE — PROGRESS NOTES
ANTICOAGULATION MANAGEMENT     Edson Thornton 57 year old male is on warfarin with therapeutic INR result. (Goal INR 2.0-3.0)    Recent labs: (last 7 days)     12/09/22  0929   INR 2.3*       ASSESSMENT       Source(s): Patient/Caregiver Call       Warfarin doses taken: Warfarin taken as instructed    Diet: No new diet changes identified    New illness, injury, or hospitalization: No    Medication/supplement changes: None noted    Signs or symptoms of bleeding or clotting: No    Previous INR: Therapeutic last 2(+) visits    Additional findings: None       PLAN     Recommended plan for no diet, medication or health factor changes affecting INR     Dosing Instructions: Continue your current warfarin dose with next INR in 1 week       Summary  As of 12/12/2022    Full warfarin instructions:  5 mg every Tue, Fri; 4 mg all other days; Starting 12/12/2022   Next INR check:  12/16/2022             Telephone call with Bret who verbalizes understanding and agrees to plan and who agrees to plan and repeated back plan correctly    Patient using outside facility for labs    Education provided:     None required    Plan made per [facility] anticoagulation protocol    Suni Choi, RN  Anticoagulation Clinic  12/12/2022    _______________________________________________________________________     Anticoagulation Episode Summary     Current INR goal:  2.0-3.0   TTR:  58.7 % (11.2 mo)   Target end date:  Indefinite   Send INR reminders to:  [facility]    Indications    Atrial fibrillation  unspecified type (H) [I48.91]           Comments:  Zay (p) [phone]  (f) [phone]         Anticoagulation Care Providers     Provider Role Specialty Phone number    Abiodun Woods MD Referring Pulmonary Disease [phone]

## 2022-12-19 ENCOUNTER — ANTICOAGULATION THERAPY VISIT (OUTPATIENT)
Dept: ANTICOAGULATION | Facility: CLINIC | Age: 57
End: 2022-12-19

## 2022-12-19 DIAGNOSIS — I48.91 ATRIAL FIBRILLATION, UNSPECIFIED TYPE (H): Primary | ICD-10-CM

## 2022-12-20 ENCOUNTER — ANTICOAGULATION THERAPY VISIT (OUTPATIENT)
Dept: ANTICOAGULATION | Facility: CLINIC | Age: 57
End: 2022-12-20

## 2022-12-20 ENCOUNTER — ANCILLARY PROCEDURE (OUTPATIENT)
Dept: GENERAL RADIOLOGY | Facility: CLINIC | Age: 57
End: 2022-12-20
Payer: COMMERCIAL

## 2022-12-20 ENCOUNTER — LAB (OUTPATIENT)
Dept: LAB | Facility: CLINIC | Age: 57
End: 2022-12-20
Payer: COMMERCIAL

## 2022-12-20 ENCOUNTER — OFFICE VISIT (OUTPATIENT)
Dept: TRANSPLANT | Facility: CLINIC | Age: 57
End: 2022-12-20
Attending: INTERNAL MEDICINE
Payer: COMMERCIAL

## 2022-12-20 VITALS
WEIGHT: 161 LBS | SYSTOLIC BLOOD PRESSURE: 135 MMHG | HEART RATE: 68 BPM | BODY MASS INDEX: 27.49 KG/M2 | HEIGHT: 64 IN | OXYGEN SATURATION: 97 % | DIASTOLIC BLOOD PRESSURE: 87 MMHG

## 2022-12-20 DIAGNOSIS — Z94.2 S/P LUNG TRANSPLANT (H): ICD-10-CM

## 2022-12-20 DIAGNOSIS — Z94.2 S/P LUNG TRANSPLANT (H): Primary | ICD-10-CM

## 2022-12-20 DIAGNOSIS — E83.42 HYPOMAGNESEMIA: ICD-10-CM

## 2022-12-20 DIAGNOSIS — I10 PRIMARY HYPERTENSION: ICD-10-CM

## 2022-12-20 DIAGNOSIS — N18.31 STAGE 3A CHRONIC KIDNEY DISEASE (H): ICD-10-CM

## 2022-12-20 DIAGNOSIS — Z79.899 ENCOUNTER FOR LONG-TERM (CURRENT) USE OF HIGH-RISK MEDICATION: ICD-10-CM

## 2022-12-20 DIAGNOSIS — E83.52 HYPERCALCEMIA: ICD-10-CM

## 2022-12-20 DIAGNOSIS — D84.9 IMMUNOSUPPRESSED STATUS (H): ICD-10-CM

## 2022-12-20 DIAGNOSIS — Z94.2 S/P LUNG TRANSPLANT (H): Primary | Chronic | ICD-10-CM

## 2022-12-20 DIAGNOSIS — I48.91 ATRIAL FIBRILLATION, UNSPECIFIED TYPE (H): Primary | ICD-10-CM

## 2022-12-20 DIAGNOSIS — I48.91 ATRIAL FIBRILLATION, UNSPECIFIED TYPE (H): ICD-10-CM

## 2022-12-20 LAB
ANION GAP SERPL CALCULATED.3IONS-SCNC: 11 MMOL/L (ref 7–15)
BUN SERPL-MCNC: 19.1 MG/DL (ref 6–20)
CALCIUM SERPL-MCNC: 9.9 MG/DL (ref 8.6–10)
CHLORIDE SERPL-SCNC: 111 MMOL/L (ref 98–107)
CREAT SERPL-MCNC: 1.36 MG/DL (ref 0.67–1.17)
DEPRECATED HCO3 PLAS-SCNC: 27 MMOL/L (ref 22–29)
ERYTHROCYTE [DISTWIDTH] IN BLOOD BY AUTOMATED COUNT: 14.6 % (ref 10–15)
EXPTIME-PRE: 8.37 SEC
FEF2575-%PRED-PRE: 30 %
FEF2575-PRE: 0.81 L/SEC
FEF2575-PRED: 2.62 L/SEC
FEFMAX-%PRED-PRE: 64 %
FEFMAX-PRE: 5.34 L/SEC
FEFMAX-PRED: 8.24 L/SEC
FEV1-%PRED-PRE: 56 %
FEV1-PRE: 1.61 L
FEV1FEV6-PRE: 66 %
FEV1FEV6-PRED: 79 %
FEV1FVC-PRE: 66 %
FEV1FVC-PRED: 80 %
FIFMAX-PRE: 4.13 L/SEC
FVC-%PRED-PRE: 68 %
FVC-PRE: 2.46 L
FVC-PRED: 3.59 L
GFR SERPL CREATININE-BSD FRML MDRD: 61 ML/MIN/1.73M2
GLUCOSE SERPL-MCNC: 93 MG/DL (ref 70–99)
HCT VFR BLD AUTO: 37.6 % (ref 40–53)
HGB BLD-MCNC: 11.4 G/DL (ref 13.3–17.7)
INR PPP: 2.37 (ref 0.85–1.15)
MAGNESIUM SERPL-MCNC: 1.8 MG/DL (ref 1.7–2.3)
MCH RBC QN AUTO: 25.2 PG (ref 26.5–33)
MCHC RBC AUTO-ENTMCNC: 30.3 G/DL (ref 31.5–36.5)
MCV RBC AUTO: 83 FL (ref 78–100)
PLATELET # BLD AUTO: 215 10E3/UL (ref 150–450)
POTASSIUM SERPL-SCNC: 4 MMOL/L (ref 3.4–5.3)
RBC # BLD AUTO: 4.53 10E6/UL (ref 4.4–5.9)
SODIUM SERPL-SCNC: 149 MMOL/L (ref 136–145)
WBC # BLD AUTO: 5.9 10E3/UL (ref 4–11)

## 2022-12-20 PROCEDURE — 80048 BASIC METABOLIC PNL TOTAL CA: CPT | Performed by: PATHOLOGY

## 2022-12-20 PROCEDURE — 83735 ASSAY OF MAGNESIUM: CPT | Performed by: PATHOLOGY

## 2022-12-20 PROCEDURE — 99000 SPECIMEN HANDLING OFFICE-LAB: CPT | Performed by: PATHOLOGY

## 2022-12-20 PROCEDURE — 86832 HLA CLASS I HIGH DEFIN QUAL: CPT | Performed by: PATHOLOGY

## 2022-12-20 PROCEDURE — G0463 HOSPITAL OUTPT CLINIC VISIT: HCPCS | Mod: 25 | Performed by: INTERNAL MEDICINE

## 2022-12-20 PROCEDURE — 36415 COLL VENOUS BLD VENIPUNCTURE: CPT | Performed by: PATHOLOGY

## 2022-12-20 PROCEDURE — G0463 HOSPITAL OUTPT CLINIC VISIT: HCPCS

## 2022-12-20 PROCEDURE — 85610 PROTHROMBIN TIME: CPT | Performed by: PATHOLOGY

## 2022-12-20 PROCEDURE — 99215 OFFICE O/P EST HI 40 MIN: CPT | Mod: 25 | Performed by: INTERNAL MEDICINE

## 2022-12-20 PROCEDURE — 85027 COMPLETE CBC AUTOMATED: CPT | Performed by: PATHOLOGY

## 2022-12-20 PROCEDURE — 87799 DETECT AGENT NOS DNA QUANT: CPT | Mod: 90 | Performed by: PATHOLOGY

## 2022-12-20 PROCEDURE — G0009 ADMIN PNEUMOCOCCAL VACCINE: HCPCS | Performed by: INTERNAL MEDICINE

## 2022-12-20 PROCEDURE — 94375 RESPIRATORY FLOW VOLUME LOOP: CPT | Performed by: INTERNAL MEDICINE

## 2022-12-20 PROCEDURE — 250N000011 HC RX IP 250 OP 636: Performed by: INTERNAL MEDICINE

## 2022-12-20 PROCEDURE — 90677 PCV20 VACCINE IM: CPT | Performed by: INTERNAL MEDICINE

## 2022-12-20 PROCEDURE — 71046 X-RAY EXAM CHEST 2 VIEWS: CPT | Mod: GC | Performed by: RADIOLOGY

## 2022-12-20 PROCEDURE — 86833 HLA CLASS II HIGH DEFIN QUAL: CPT | Performed by: PATHOLOGY

## 2022-12-20 RX ORDER — BUPROPION HYDROCHLORIDE 150 MG/1
150 TABLET ORAL
Status: ON HOLD | COMMUNITY
Start: 2022-12-16 | End: 2023-03-08

## 2022-12-20 RX ADMIN — PNEUMOCOCCAL 20-VALENT CONJUGATE VACCINE 0.5 ML
2.2; 2.2; 2.2; 2.2; 2.2; 2.2; 2.2; 2.2; 2.2; 2.2; 2.2; 2.2; 2.2; 2.2; 2.2; 2.2; 4.4; 2.2; 2.2; 2.2 INJECTION, SUSPENSION INTRAMUSCULAR at 10:13

## 2022-12-20 ASSESSMENT — PAIN SCALES - GENERAL: PAINLEVEL: MODERATE PAIN (5)

## 2022-12-20 NOTE — NURSING NOTE
The following medication was given:     MEDICATION: Pneumococcal 20-valent Conjugate Vaccine  ROUTE: IM  SITE: Deltoid - Right  DOSE: 0.5 mL   LOT #: [id number]  :  [employer]  EXPIRATION DATE:  02/2024  NDC#: 2458-1114-79    Tammie Prakash, EMT

## 2022-12-20 NOTE — LETTER
Date:December 21, 2022      Patient was self referred, no letter generated. Do not send.        [facility] Health Information

## 2022-12-20 NOTE — PROGRESS NOTES
Transplant Coordinator Note    Reason for visit: Post lung transplant follow up visit   Coordinator: Present   Caregiver: Peg BROWN     Health concerns addressed today:  1. Respiratory - no illnesses since last visit. A little more short of breath at rest - crept up slowly. Getting winded with activities (able to get up a flight of stairs without stopping) - at baseline.   2. Has fallen 4 times since last visit (2 weeks ago) - chest pain, some chest popping intermittently.   3.  GI: poor appetite (don't feel hungry, same as before transplant) - eating. A little nausea  4. ENT: clear drainage, otherwise no issues. At baseline.   5. No new visual changes/double vision  6. Vertigo  7. Pain on tongue/dry mouth.     Activity/rehab: up [place], walking 4 blocks most days of the week  Oxygen needs: room air  Pain management/RX: joint/bone pain (wrist and fingers), sees rheumatology next week. Some swelling. Using Volteran Gel.   Diabetic management: on lantus, occasional novolog  Next Bronch due: 9/13/20222  CMV status: D-/R-  EBV status: D+/R+  AC/asa: on coumadin, bridged to Lovenox for bronch (lower dose per CrCl d/t bleeding post bronch x 2)  PJP prophylactic: Bactrim     COVID:  1. COVID-19 infection (yes/no, date of most recent positive test): no  2. Status/instructions given about COVID-19 vaccine: Has received full dose of Evusheld 1/7/2022 and 4/18/22. Received Evusheld 10/18/2022 locally. Due 2/2023 for the new COVID vaccine booster      Labs, CXR, PFTs reviewed with patient  Medication record reviewed and reconciled  Questions and concerns addressed     Vaccines - patient will get flu vaccine today (11/14/2022).   Future vaccines, plan for Prevenar 20 in 1 month (December 2022). Patient should get Hepatitis B vaccine series after getting Prevenar.     Health Maintenance:     Last colonoscopy:     Next colonoscopy due: due    Dermatology:    Vaccinations this visit: Prevenar 20 today.     Labs, CXR, PFTs reviewed  with patient  Medication record reviewed and reconciled  Questions and concerns addressed    Patient Instructions  1. Continue to hydrate with 60-70 oz fluids daily.  2. Continue to exercise daily or most days of the week.  3. Follow up with your primary care provider for annual gender health maintenance procedures.  4. Follow up with colonoscopy schedule.  5. Follow up with annual dermatology visits.  6. It doesn't seem like the COVID vaccine is working well in lung transplant patients. A number of lung transplant patients have gotten sick with COVID even after receiving the vaccines.  Based on our recent experience, it can be life-threatening to get COVID  even after being vaccinated. Please continue to act like you did not get the COVID vaccine - social distancing, wearing a mask, good hand hygiene, etc. If the people around you are vaccinated, it will help reduce the risk of you getting COVID. All members of your household should be vaccinated.  7. See if there is a vertigo clinic in [place]. Also see the eye doctor.   8. See the dentist for your tongue/dry mouth issues.     Next transplant clinic appointment: 4 months with CXR, labs and PFTs  Next lab draw: pending tacrolimus level, otherwise monthly      AVS printed at time of check out

## 2022-12-20 NOTE — PROGRESS NOTES
Reason for Visit  Edson Thornton is a 57 year old year old male who is being seen for RECHECK (Follow-up s/p lung transplant 10/16/2021)      Assessment and plan:   Edson Thornton is a 57 year old male with NSIP/ILD, bronchiectasis, moderate PH, RA, SALTY, chronic HSV infection, hypogammaglobulinemia, steroid-induced diabetes, hypothyroidism, PFO, HTN, HLD, duodenal anomaly, anxiety, and depression. Admitted on 9/5/21 from OSH for acute on chronic respiratory failure 2/2 ILD exacerbation now s/p BSLT on 10/16/21. Prolonged vent wean s/p trach and PEG/J tube.  Post-op course also complicated by encephalopathy and diffuse weakness, acute to subacute CVA, afib with RVR, BRENNAN, GI bleed, Candidemia/Candida empyema, and anxiety. Code called 11/2 for bradycardia/asystole, progressive hypotension, found to have significant GI bleed, EGD with adherent clot near PEG tube site that was clipped.  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    Pulmonary/lung transplant: The patient reports intermittent dyspnea at rest.  Tolerates moderate exertion such as 1 flight of stairs.  No change in the recent past.  He is oxygenating well.  He had mild desaturation with 6-minute walk at his last visit but was above 93% throughout.  PFTs with moderate obstructive ventilatory defect but marked improvement from his last visit and best since transplant.  Chest x-ray, reviewed by me with no acute infiltrate and no change from previous.  Etiology of the ongoing dyspnea is unclear.  Oxygenation is adequate.  Chest x-ray is unremarkable.  PFTs are improving.  He is chronically anticoagulated so PE would be unlikely.  He did have a long complicated postoperative course, there may still be a component of deconditioning.  He was encouraged to exercise.  Pulmonary physical therapy may be considered if symptoms persist.   Echocardiogram will be checked with his next visit to rule out nonpulmonary causes.  Although he is mildly anemic, this is not sufficient to explain his symptoms.  He will continue his current immunosuppression.  Tacrolimus will be adjusted to maintain a level 8-10.    Positive crossmatch: The patient had a retrospective positive crossmatch following transplantation, attributed to rituximab received in June.  Subsequent DSA is positive, waxing and waning but consistently below the MFI threshold for treatment.   Date DSA mfi Biopsy (date) Treatment   10/17/2021  none         10/23/2021  none         11/1/2021  none         11/15/2021  none         11/29/2021  DQ B5  2522       12/6/2021  DQ B5  1873       12/12/2021  DQ B5  1703       12/27/2021  DQ B5  3924       1/3/2022  DQ B5  3072       1/10/2022  DQ B5  4032       1/17/2022  DQB5  1849       2/3/2022 DQB5   2488       2/7/2022 DQB5  1874       2/10/2022 DQB5  1781       3/15/2022 DQB5  2254       3/21/2022 DQB5  2117       4/18/2022 DQB5   908       6/20/2022  DQB5  1100       6/29/2022  DQB5  1411       9/12/2022 DQB5  1681       11/14/2022 DQB5  891                                             Chest trauma: The patient has fallen 4 times in the past month.  3 times slipping on the ice and striking his chest.  I do not appreciate any fractures or sternal dehiscence on chest x-ray.  The patient does report that pain is gradually improving.  He was strongly encouraged to use cleats when walking outside to avoid additional falls.  We did discuss the risk of major fractures given his chronic prednisone use.    Vertigo: The patient reports vertigo-like symptoms when shifting his eyes from the keyboard to the computer screen.  It is unclear if this was related to his strokes, the related visual deficits or some other etiology.  I have recommended that he follow-up with his ophthalmologist and vertigo clinic for further evaluation and possible vertigo therapy.  He  will pursue this at home in [place].    Mouth burning: Etiology is unclear.  Exam does not suggest thrush.  We will review his medications with the pharmacist for possible etiologies.  In addition, I have encouraged him to follow-up with his dentist.    Prophylaxis: Continue Bactrim for PJP and nocardia.  Continue CMV monitoring (CMV -/-).    Ophthalmology: History of double vision and visual loss.  Defer to ophthalmology for additional follow-up.    Rheumatoid arthritis: Adequately controlled with current immunosuppression.  Annual follow-up with rheumatology for in the interim if there is worsened joint pain.  Continue Voltaren gel as needed.    Atrial fibrillation with RVR: The patient appears to be in sinus rhythm on exam today.  Continue propranolol and warfarin.    Infectious disease:  Disseminated Candida: The patient is completed a course of fluconazole.  Recurrent HSV: Resolved.  If recurrent, consider chronic suppressive therapy.    Hypogammaglobulinemia: Continue annual monitoring.  Replacement only if recurrent infections.    Sleep apnea: Continue CPAP.    Hypomagnesemia: Magnesium level is adequate.  Continue current replacement.    Hypertension: Blood pressure is adequately controlled with current amlodipine and propranolol.    Depression/anxiety: Adequately controlled with Cymbalta.    Reflux/history of GI bleed: Continue pantoprazole.    Diabetes/steroid-induced hyperglycemia: Blood sugar is adequately controlled with current insulin regimen.    Hypothyroidism: Continue levothyroxine.    Multiple acute/subacute ischemic strokes: etiology likely embolic vs perioperative. MRI brain 10/23 with infarcts noted in both cerebral hemispheres, left cerebellum, presumed associated with new Afib vs perioperative. Bilateral upper extremity paresis (R>L), suspicion for some cortical blindness. SBP goal < 140. Continue warfarin, HTN and BS control and rosuvastatin.    Bone health: DEXA with reduced bone  density.  Hypercalcemia and elevated parathyroid level at the last visit.  Calcium has normalized with a reduction in the calcium replacement.  Patient is scheduled for endocrine evaluation in April.      CKD: History of stage IIIa CKD.  Creatinine is in a range similar to his recent baseline.  Matt's goal was reduced at the 1 year cr.  The patient should avoid other nephrotoxins.    Healthcare maintenance: The patient is up-to-date on COVID vaccines and has received the influenza vaccine for this flu season.  Annual studies were performed in November.  Prevnar 20 today.    Follow-up in April with labs, x-ray and PFTs to correspond with the endocrine visit so the patient can make a single trip to the [place].    I, Abiodun Woods, have spent 60 minutes on the day of the visit to review the chart, interview and examine the patient, review labs and imaging, formulate a plan, document and submit orders. Time documented is excluding time spent for PFT interpretation.     Abiodun Woods MD             Lung TX HPI  Transplants:  10/16/2021 (Lung), Postoperative day:  430    The patient was seen and examined by Abiodun Woods MD   Edson Thornton is a 57 year old male with NSIP/ILD, bronchiectasis, moderate PH, RA, SALTY, chronic HSV infection, hypogammaglobulinemia, steroid-induced diabetes, hypothyroidism, PFO, HTN, HLD, duodenal anomaly, anxiety, and depression. Admitted on 9/5/21 from OSH for acute on chronic respiratory failure 2/2 ILD exacerbation now s/p BSLT on 10/16/21. Prolonged vent wean s/p trach and PEG/J tube.  Post-op course also complicated by encephalopathy and diffuse weakness, acute to subacute CVA, afib with RVR, BRENNAN, GI bleed, Candidemia/Candida empyema, and anxiety. Code called 11/2 for bradycardia/asystole, progressive hypotension, found to have significant GI bleed, EGD with adherent clot near PEG tube site that was clipped.  The patient had a positive crossmatch following  "transplant which was attributed to rituximab patient had received in June 2021 but subsequently has had consistently positive DSA.    At his last visit, he was noted to have a decrease in PFTs.  Interestingly he had a positive bronchodilator response so Advair was initiated and he is being seen today in short interval follow-up.    Since his last visit, he fell 4 times.  Once he missed a curb and fell striking his forehead requiring a trip to the emergency room the other 3 times he slipped on the ice on his driveway striking his back or chest.  He does have some ongoing chest pain from the falls which is gradually improving.  He does feel something \"popping\" around his sternum.    Ongoing mild dyspnea at rest.  Possibly increased from previous.  Dyspnea with moderate exertion such as 1 flight of stairs, unchanged in the recent past.  Occasional cough.  No sputum.  No fever or chills.  Chronic intermittent night sweats.    Review of systems:  Appetite is poor but the patient is maintaining his weight.  Poor appetite predates transplant.  Clear rhinorrhea  Occasional nausea associated with vertigo (see below) no vomiting.  No abdominal pain.  Wrist pain secondary to rheumatoid arthritis fairly well controlled with current medication  Easy bruising  The patient reports vertigo when moving eyes up and down such as from the keyboard to screen.  Reports mouth burning with spicy or salty food.  A complete ROS was otherwise negative except as noted in the HPI.    Current Outpatient Medications   Medication     acetaminophen (TYLENOL) 325 MG tablet     amLODIPine (NORVASC) 10 MG tablet     buPROPion (WELLBUTRIN XL) 150 MG 24 hr tablet     calcium carbonate 600 mg-vitamin D 400 units 600-400 MG-UNIT per tablet     diclofenac (VOLTAREN) 1 % topical gel     DULoxetine (CYMBALTA) 30 MG capsule     fluticasone (FLONASE) 50 MCG/ACT nasal spray     fluticasone-salmeterol (ADVAIR) 500-50 MCG/ACT inhaler     insulin aspart (NOVOLOG " PEN) 100 UNIT/ML pen     insulin glargine (LANTUS PEN) 100 UNIT/ML pen     insulin pen needle (32G X 4 MM) 32G X 4 MM miscellaneous     levothyroxine (SYNTHROID/LEVOTHROID) 25 MCG tablet     Magnesium Glycinate POWD     Melatonin 10 MG TABS tablet     multivitamin w/minerals (THERA-VIT-M) tablet     MYFORTIC (BRAND) 360 MG EC tablet     ondansetron (ZOFRAN-ODT) 4 MG ODT tab     pantoprazole (PROTONIX) 40 MG EC tablet     predniSONE (DELTASONE) 2.5 MG tablet     predniSONE (DELTASONE) 5 MG tablet     propranolol (INDERAL) 20 MG tablet     rOPINIRole (REQUIP) 0.25 MG tablet     rosuvastatin (CRESTOR) 10 MG tablet     senna-docusate (SENOKOT-S/PERICOLACE) 8.6-50 MG tablet     sulfamethoxazole-trimethoprim (BACTRIM) 400-80 MG tablet     tacrolimus (GENERIC EQUIVALENT) 1 MG capsule     tamsulosin (FLOMAX) 0.4 MG capsule     warfarin ANTICOAGULANT (COUMADIN) 1 MG tablet     warfarin ANTICOAGULANT (COUMADIN) 2 MG tablet     zolpidem (AMBIEN) 10 MG tablet     levalbuterol (XOPENEX HFA) 45 MCG/ACT inhaler     Current Facility-Administered Medications   Medication     pneumococcal (PREVNAR 20) injection 0.5 mL     No Known Allergies  Past Medical History:   Diagnosis Date     BRENNAN (acute kidney injury) (H) 10/17/2021     Anxiety      Depression      HLD (hyperlipidemia)      HTN (hypertension)      Hypothyroidism      ILD (interstitial lung disease) (H)      SATLY on CPAP      Oxygen dependent     BL 4L since ~6/2021     Primary hypertension 2/28/2022     Rheumatoid arthritis (H)     signs ~5/2020, dx 5/2021     S/P lung transplant (H) 10/16/2021     Shock liver 10/17/2021     Steroid-induced hyperglycemia      Traction bronchiectasis (H)        Past Surgical History:   Procedure Laterality Date     BRONCHOSCOPY (RIGID OR FLEXIBLE), DIAGNOSTIC N/A 1/26/2022    Procedure: BRONCHOSCOPY, WITH BRONCHOALVEOLAR LAVAGE AND BIOPSY;  Surgeon: Perlman, David Morris, MD;  Location:  GI     BRONCHOSCOPY (RIGID OR FLEXIBLE), DIAGNOSTIC N/A  4/19/2022    Procedure: BRONCHOSCOPY, WITH BRONCHOALVEOLAR LAVAGE AND BIOPSY;  Surgeon: Sherine Merrill MD;  Location:  GI     BRONCHOSCOPY (RIGID OR FLEXIBLE), DIAGNOSTIC N/A 6/21/2022    Procedure: BRONCHOSCOPY, WITH BRONCHOALVEOLAR LAVAGE AND BIOPSY;  Surgeon: Aneesh Rubio MD;  [facility]: [facility]     COLONOSCOPY W/ BIOPSIES AND POLYPECTOMY  07/21/2020     CV CORONARY ANGIOGRAM N/A 09/08/2021    Procedure: Coronary Angiogram with possible intervention;  Surgeon: Jovon Bullock MD;  Location:  HEART CARDIAC CATH LAB     CV RIGHT HEART CATH MEASUREMENTS RECORDED N/A 09/08/2021    Procedure: Right Heart Cath;  Surgeon: Jovon Bullock MD;  Location:  HEART CARDIAC CATH LAB     ESOPHAGOSCOPY, GASTROSCOPY, DUODENOSCOPY (EGD), COMBINED N/A 10/23/2021    Procedure: ESOPHAGOGASTRODUODENOSCOPY (EGD);  Surgeon: Miquel Pisano MD;  Location:  GI     ESOPHAGOSCOPY, GASTROSCOPY, DUODENOSCOPY (EGD), COMBINED N/A 11/02/2021    Procedure: ESOPHAGOGASTRODUODENOSCOPY (EGD);  Surgeon: Daniel Ortiz MD;  Location:  GI     ESOPHAGOSCOPY, GASTROSCOPY, DUODENOSCOPY (EGD), COMBINED N/A 11/5/2021    Procedure: ESOPHAGOGASTRODUODENOSCOPY (EGD);  Surgeon: Ronnell Hernandez MD;  Location:  GI     EXTRACTION(S) DENTAL N/A 09/22/2021    Procedure: EXTRACTION tooth #19;  Surgeon: Deepak Tobin DDS;  [facility]: [facility]     HERNIA REPAIR       IR CHEST TUBE PLACEMENT NON-TUNNELLED LEFT  11/03/2021     PICC TRIPLE LUMEN PLACEMENT Left 11/04/2021    5FR TL PICC. Right non occlusive thrombus subclavian vein.     REPLACE GASTROJEJUNOSTOMY TUBE, PERCUTANEOUS N/A 11/9/2021    Procedure: REPLACEMENT, GASTROJEJUNOSTOMY TUBE, PERCUTANEOUS;  Surgeon: Hernando Rodriguez MD;  Location: [facility]     right acl       TRACHEOSTOMY PERCUTANEOUS N/A 10/29/2021    Procedure: Percutaneous Tracheostomy,;  Surgeon: Celine Jenkins MD;  [facility]     TRANSPLANT LUNG RECIPIENT SINGLE X2 Bilateral 10/16/2021     "Procedure: TRANSPLANT, LUNG, RECIPIENT, BILATERAL, Bronchoscopy, on-pump perfusion, bilateral clamshell sternotomy;  Surgeon: Yanick Corral MD;  Location: [facility]     XR ACROMIOCLAVICULAR JOINT BILATERAL         Social History     Socioeconomic History     Marital status: Single     Spouse name: Not on file     Number of children: Not on file     Years of education: Not on file     Highest education level: Not on file   Occupational History     Not on file   Tobacco Use     Smoking status: Never     Smokeless tobacco: Never   Substance and Sexual Activity     Alcohol use: Never     Drug use: Never     Sexual activity: Not on file   Other Topics Concern     Parent/sibling w/ CABG, MI or angioplasty before 65F 55M? Not Asked   Social History Narrative     Not on file     Social Determinants of Health     Financial Resource Strain: Not on file   Food Insecurity: Not on file   Transportation Needs: Not on file   Physical Activity: Not on file   Stress: Not on file   Social Connections: Not on file   Intimate Partner Violence: Not on file   Housing Stability: Not on file         /87 (BP Location: Left arm, Patient Position: Sitting, Cuff Size: Adult Regular)   Pulse 68   Ht 1.626 m (5' 4.02\")   Wt 73 kg (161 lb)   SpO2 97%   BMI 27.62 kg/m    Body mass index is 27.62 kg/m .  Exam:   GENERAL APPEARANCE: Well developed, well nourished, alert, and in no apparent distress.  EYES: PERRL, EOMI  EARS: Canals clear, TMs normal  MOUTH: Oral mucosa is moist, without any lesions, no tonsillar enlargement, no oropharyngeal exudate. Faint tan coating on tongue.  NECK: supple, no masses, no thyromegaly.  LYMPHATICS: No significant axillary, cervical, or supraclavicular nodes.  RESP: normal percussion, good air flow throughout.  No crackles. No rhonchi. No wheezes.  CV: Normal S1, S2, regular rhythm, normal rate. No murmur.  No rub. No gallop. No LE edema.   ABDOMEN:  Bowel sounds normal, soft, nontender, no HSM or " masses.   MS: extremities normal. No clubbing. No cyanosis.  SKIN: no rash on limited exam  NEURO: Mentation intact, speech normal, normal strength and tone, normal gait and stance  PSYCH: mentation appears normal. and affect normal/bright  Results:  Recent Results (from the past 168 hour(s))   Basic metabolic panel    Collection Time: 12/16/22  9:40 AM   Result Value Ref Range    Glucose (External) 104 (H) 70 - 99 mg/dL    Urea Nitrogen (External) 15 6 - 22 mg/dL    Creatinine (External) 1.38 (H) 0.73 - 1.18 mg/dL    BUN/Creatinine Ratio (External) 10.9 10.0 - 25.0    Sodium (External) 144 136 - 145 meq/L    Potassium (External) 4.7 3.5 - 5.1 meq/L    Chloride (External) (External) 109 98 - 109 meq/L    CO2 (External) 23 20 - 29 meq/L    Anion Gap (External) 17 6 - 20 meq/L    Calcium (External) 10.0 8.5 - 10.5 mg/dL    GFR Estimated (External) 60 >=60 ml/min/1.73m2   INR    Collection Time: 12/16/22  9:40 AM   Result Value Ref Range    PT - Prothrombine Time (External) 24.3 (H) 12.0 - 14.5 secs    INR (External) 2.2 (H) 0.9 - 1.1   Magnesium    Collection Time: 12/16/22  9:40 AM   Result Value Ref Range    Magnesium (External) 1.6 1.6 - 2.6 mg/dL   CBC with Platelets & Differential    Collection Time: 12/16/22  9:40 AM   Result Value Ref Range    WBC Count (External) 6.6 4.0 - 11.0 K/uL    RBC Count (External) 4.79 4.40 - 5.80 M/uL    Hemoglobin (External) 12.1 (L) 13.5 - 17.5 g/dL    Hematocrit (External) 40.4 40.0 - 50.0 %    MCV (External) 84.3 80.0 - 98.0 fL    MCH (External) 25.3 (L) 25.5 - 34.0 pg    MCHC (External) 30.0 (L) 31.5 - 36.5 g/dL    RDW (External) 14.7 11.5 - 15.5 %    Platelet Count (External) 222 140 - 400 K/uL    Absolute Neutrophils (External) 4.1 1.8 - 8.0 K/uL    Absolute Lymphocytes (External) 1.6 0.8 - 4.1 K/uL    Absolute Monocytes (External) 0.7 0.0 - 1.0 K/uL    Absolute Eosinophils (External) 0.1 0.0 - 0.7 K/uL    Absolute Basophils (External) 0.1 0.0 - 0.2 K/uL    % Neutrophils  (External) 62.1 %    % Lymphocytes (External) 23.7 %    % Monocytes (External) 11.1 %    % Eosinophils (External) 1.1 %    % Basophils (External) 2.1 %   INR    Collection Time: 12/20/22  7:46 AM   Result Value Ref Range    INR 2.37 (H) 0.85 - 1.15   CBC with platelets    Collection Time: 12/20/22  7:46 AM   Result Value Ref Range    WBC Count 5.9 4.0 - 11.0 10e3/uL    RBC Count 4.53 4.40 - 5.90 10e6/uL    Hemoglobin 11.4 (L) 13.3 - 17.7 g/dL    Hematocrit 37.6 (L) 40.0 - 53.0 %    MCV 83 78 - 100 fL    MCH 25.2 (L) 26.5 - 33.0 pg    MCHC 30.3 (L) 31.5 - 36.5 g/dL    RDW 14.6 10.0 - 15.0 %    Platelet Count 215 150 - 450 10e3/uL   Magnesium    Collection Time: 12/20/22  7:46 AM   Result Value Ref Range    Magnesium 1.8 1.7 - 2.3 mg/dL   Basic metabolic panel    Collection Time: 12/20/22  7:46 AM   Result Value Ref Range    Sodium 149 (H) 136 - 145 mmol/L    Potassium 4.0 3.4 - 5.3 mmol/L    Chloride 111 (H) 98 - 107 mmol/L    Carbon Dioxide (CO2) 27 22 - 29 mmol/L    Anion Gap 11 7 - 15 mmol/L    Urea Nitrogen 19.1 6.0 - 20.0 mg/dL    Creatinine 1.36 (H) 0.67 - 1.17 mg/dL    Calcium 9.9 8.6 - 10.0 mg/dL    Glucose 93 70 - 99 mg/dL    GFR Estimate 61 >60 mL/min/1.73m2   General PFT Lab (Please always keep checked)    Collection Time: 12/20/22  8:08 AM   Result Value Ref Range    FVC-Pred 3.59 L    FVC-Pre 2.46 L    FVC-%Pred-Pre 68 %    FEV1-Pre 1.61 L    FEV1-%Pred-Pre 56 %    FEV1FVC-Pred 80 %    FEV1FVC-Pre 66 %    FEFMax-Pred 8.24 L/sec    FEFMax-Pre 5.34 L/sec    FEFMax-%Pred-Pre 64 %    FEF2575-Pred 2.62 L/sec    FEF2575-Pre 0.81 L/sec    CGG6740-%Pred-Pre 30 %    ExpTime-Pre 8.37 sec    FIFMax-Pre 4.13 L/sec    FEV1FEV6-Pred 79 %    FEV1FEV6-Pre 66 %       Results as noted above.    PFT Interpretation:  Moderate obstructive ventilatory defect.  Increased from previous.  Best since lung transplantation  Valid Maneuver

## 2022-12-20 NOTE — LETTER
12/20/2022         RE: Edson Thornton  [address]  [place] [address]        Dear Colleague,    Thank you for referring your patient, Edson Thornton, to the [facility]. Please see a copy of my visit note below.    Reason for Visit  Edson Thornton is a 57 year old year old male who is being seen for RECHECK (Follow-up s/p lung transplant 10/16/2021)      Assessment and plan:   Edson Thornton is a 57 year old male with NSIP/ILD, bronchiectasis, moderate PH, RA, SALTY, chronic HSV infection, hypogammaglobulinemia, steroid-induced diabetes, hypothyroidism, PFO, HTN, HLD, duodenal anomaly, anxiety, and depression. Admitted on 9/5/21 from OSH for acute on chronic respiratory failure 2/2 ILD exacerbation now s/p BSLT on 10/16/21. Prolonged vent wean s/p trach and PEG/J tube.  Post-op course also complicated by encephalopathy and diffuse weakness, acute to subacute CVA, afib with RVR, BRENNAN, GI bleed, Candidemia/Candida empyema, and anxiety. Code called 11/2 for bradycardia/asystole, progressive hypotension, found to have significant GI bleed, EGD with adherent clot near PEG tube site that was clipped.  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    Pulmonary/lung transplant: The patient reports intermittent dyspnea at rest.  Tolerates moderate exertion such as 1 flight of stairs.  No change in the recent past.  He is oxygenating well.  He had mild desaturation with 6-minute walk at his last visit but was above 93% throughout.  PFTs with moderate obstructive ventilatory defect but marked improvement from his last visit and best since transplant.  Chest x-ray, reviewed by me with no acute infiltrate and no change from previous.  Etiology of the ongoing dyspnea is unclear.  Oxygenation is adequate.  Chest x-ray is unremarkable.  PFTs are improving.  He  is chronically anticoagulated so PE would be unlikely.  He did have a long complicated postoperative course, there may still be a component of deconditioning.  He was encouraged to exercise.  Pulmonary physical therapy may be considered if symptoms persist.  Echocardiogram will be checked with his next visit to rule out nonpulmonary causes.  Although he is mildly anemic, this is not sufficient to explain his symptoms.  He will continue his current immunosuppression.  Tacrolimus will be adjusted to maintain a level 8-10.    Positive crossmatch: The patient had a retrospective positive crossmatch following transplantation, attributed to rituximab received in June.  Subsequent DSA is positive, waxing and waning but consistently below the MFI threshold for treatment.   Date DSA mfi Biopsy (date) Treatment   10/17/2021  none         10/23/2021  none         11/1/2021  none         11/15/2021  none         11/29/2021  DQ B5  2522       12/6/2021  DQ B5  1873       12/12/2021  DQ B5  1703       12/27/2021  DQ B5  3924       1/3/2022  DQ B5  3072       1/10/2022  DQ B5  4032       1/17/2022  DQB5  1849       2/3/2022 DQB5   2488       2/7/2022 DQB5  1874       2/10/2022 DQB5  1781       3/15/2022 DQB5  2254       3/21/2022 DQB5  2117       4/18/2022 DQB5   908       6/20/2022  DQB5  1100       6/29/2022  DQB5  1411       9/12/2022 DQB5  1681       11/14/2022 DQB5  891                                             Chest trauma: The patient has fallen 4 times in the past month.  3 times slipping on the ice and striking his chest.  I do not appreciate any fractures or sternal dehiscence on chest x-ray.  The patient does report that pain is gradually improving.  He was strongly encouraged to use cleats when walking outside to avoid additional falls.  We did discuss the risk of major fractures given his chronic prednisone use.    Vertigo: The patient reports vertigo-like symptoms when shifting his eyes from the keyboard to the  computer screen.  It is unclear if this was related to his strokes, the related visual deficits or some other etiology.  I have recommended that he follow-up with his ophthalmologist and vertigo clinic for further evaluation and possible vertigo therapy.  He will pursue this at home in [place].    Mouth burning: Etiology is unclear.  Exam does not suggest thrush.  We will review his medications with the pharmacist for possible etiologies.  In addition, I have encouraged him to follow-up with his dentist.    Prophylaxis: Continue Bactrim for PJP and nocardia.  Continue CMV monitoring (CMV -/-).    Ophthalmology: History of double vision and visual loss.  Defer to ophthalmology for additional follow-up.    Rheumatoid arthritis: Adequately controlled with current immunosuppression.  Annual follow-up with rheumatology for in the interim if there is worsened joint pain.  Continue Voltaren gel as needed.    Atrial fibrillation with RVR: The patient appears to be in sinus rhythm on exam today.  Continue propranolol and warfarin.    Infectious disease:  Disseminated Candida: The patient is completed a course of fluconazole.  Recurrent HSV: Resolved.  If recurrent, consider chronic suppressive therapy.    Hypogammaglobulinemia: Continue annual monitoring.  Replacement only if recurrent infections.    Sleep apnea: Continue CPAP.    Hypomagnesemia: Magnesium level is adequate.  Continue current replacement.    Hypertension: Blood pressure is adequately controlled with current amlodipine and propranolol.    Depression/anxiety: Adequately controlled with Cymbalta.    Reflux/history of GI bleed: Continue pantoprazole.    Diabetes/steroid-induced hyperglycemia: Blood sugar is adequately controlled with current insulin regimen.    Hypothyroidism: Continue levothyroxine.    Multiple acute/subacute ischemic strokes: etiology likely embolic vs perioperative. MRI brain 10/23 with infarcts noted in both cerebral hemispheres, left  cerebellum, presumed associated with new Afib vs perioperative. Bilateral upper extremity paresis (R>L), suspicion for some cortical blindness. SBP goal < 140. Continue warfarin, HTN and BS control and rosuvastatin.    Bone health: DEXA with reduced bone density.  Hypercalcemia and elevated parathyroid level at the last visit.  Calcium has normalized with a reduction in the calcium replacement.  Patient is scheduled for endocrine evaluation in April.      CKD: History of stage IIIa CKD.  Creatinine is in a range similar to his recent baseline.  Matt's goal was reduced at the 1 year cr.  The patient should avoid other nephrotoxins.    Healthcare maintenance: The patient is up-to-date on COVID vaccines and has received the influenza vaccine for this flu season.  Annual studies were performed in November.  Prevnar 20 today.    Follow-up in April with labs, x-ray and PFTs to correspond with the endocrine visit so the patient can make a single trip to the [place].    IAbiodun, have spent 60 minutes on the day of the visit to review the chart, interview and examine the patient, review labs and imaging, formulate a plan, document and submit orders. Time documented is excluding time spent for PFT interpretation.     Abiodun Woods MD             Lung TX HPI  Transplants:  10/16/2021 (Lung), Postoperative day:  430    The patient was seen and examined by Abiodun Woods MD   Edson Thornton is a 57 year old male with NSIP/ILD, bronchiectasis, moderate PH, RA, SALTY, chronic HSV infection, hypogammaglobulinemia, steroid-induced diabetes, hypothyroidism, PFO, HTN, HLD, duodenal anomaly, anxiety, and depression. Admitted on 9/5/21 from OSH for acute on chronic respiratory failure 2/2 ILD exacerbation now s/p BSLT on 10/16/21. Prolonged vent wean s/p trach and PEG/J tube.  Post-op course also complicated by encephalopathy and diffuse weakness, acute to subacute CVA, afib with RVR, BRENNAN, GI  "bleed, Candidemia/Candida empyema, and anxiety. Code called 11/2 for bradycardia/asystole, progressive hypotension, found to have significant GI bleed, EGD with adherent clot near PEG tube site that was clipped.  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    At his last visit, he was noted to have a decrease in PFTs.  Interestingly he had a positive bronchodilator response so Advair was initiated and he is being seen today in short interval follow-up.    Since his last visit, he fell 4 times.  Once he missed a curb and fell striking his forehead requiring a trip to the emergency room the other 3 times he slipped on the ice on his driveway striking his back or chest.  He does have some ongoing chest pain from the falls which is gradually improving.  He does feel something \"popping\" around his sternum.    Ongoing mild dyspnea at rest.  Possibly increased from previous.  Dyspnea with moderate exertion such as 1 flight of stairs, unchanged in the recent past.  Occasional cough.  No sputum.  No fever or chills.  Chronic intermittent night sweats.    Review of systems:  Appetite is poor but the patient is maintaining his weight.  Poor appetite predates transplant.  Clear rhinorrhea  Occasional nausea associated with vertigo (see below) no vomiting.  No abdominal pain.  Wrist pain secondary to rheumatoid arthritis fairly well controlled with current medication  Easy bruising  The patient reports vertigo when moving eyes up and down such as from the keyboard to screen.  Reports mouth burning with spicy or salty food.  A complete ROS was otherwise negative except as noted in the HPI.    Current Outpatient Medications   Medication     acetaminophen (TYLENOL) 325 MG tablet     amLODIPine (NORVASC) 10 MG tablet     buPROPion (WELLBUTRIN XL) 150 MG 24 hr tablet     calcium carbonate 600 " mg-vitamin D 400 units 600-400 MG-UNIT per tablet     diclofenac (VOLTAREN) 1 % topical gel     DULoxetine (CYMBALTA) 30 MG capsule     fluticasone (FLONASE) 50 MCG/ACT nasal spray     fluticasone-salmeterol (ADVAIR) 500-50 MCG/ACT inhaler     insulin aspart (NOVOLOG PEN) 100 UNIT/ML pen     insulin glargine (LANTUS PEN) 100 UNIT/ML pen     insulin pen needle (32G X 4 MM) 32G X 4 MM miscellaneous     levothyroxine (SYNTHROID/LEVOTHROID) 25 MCG tablet     Magnesium Glycinate POWD     Melatonin 10 MG TABS tablet     multivitamin w/minerals (THERA-VIT-M) tablet     MYFORTIC (BRAND) 360 MG EC tablet     ondansetron (ZOFRAN-ODT) 4 MG ODT tab     pantoprazole (PROTONIX) 40 MG EC tablet     predniSONE (DELTASONE) 2.5 MG tablet     predniSONE (DELTASONE) 5 MG tablet     propranolol (INDERAL) 20 MG tablet     rOPINIRole (REQUIP) 0.25 MG tablet     rosuvastatin (CRESTOR) 10 MG tablet     senna-docusate (SENOKOT-S/PERICOLACE) 8.6-50 MG tablet     sulfamethoxazole-trimethoprim (BACTRIM) 400-80 MG tablet     tacrolimus (GENERIC EQUIVALENT) 1 MG capsule     tamsulosin (FLOMAX) 0.4 MG capsule     warfarin ANTICOAGULANT (COUMADIN) 1 MG tablet     warfarin ANTICOAGULANT (COUMADIN) 2 MG tablet     zolpidem (AMBIEN) 10 MG tablet     levalbuterol (XOPENEX HFA) 45 MCG/ACT inhaler     Current Facility-Administered Medications   Medication     pneumococcal (PREVNAR 20) injection 0.5 mL     No Known Allergies  Past Medical History:   Diagnosis Date     BRENNAN (acute kidney injury) (H) 10/17/2021     Anxiety      Depression      HLD (hyperlipidemia)      HTN (hypertension)      Hypothyroidism      ILD (interstitial lung disease) (H)      SALTY on CPAP      Oxygen dependent     BL 4L since ~6/2021     Primary hypertension 2/28/2022     Rheumatoid arthritis (H)     signs ~5/2020, dx 5/2021     S/P lung transplant (H) 10/16/2021     Shock liver 10/17/2021     Steroid-induced hyperglycemia      Traction bronchiectasis (H)        Past Surgical  History:   Procedure Laterality Date     BRONCHOSCOPY (RIGID OR FLEXIBLE), DIAGNOSTIC N/A 1/26/2022    Procedure: BRONCHOSCOPY, WITH BRONCHOALVEOLAR LAVAGE AND BIOPSY;  Surgeon: Perlman, David Morris, MD;  Location:  GI     BRONCHOSCOPY (RIGID OR FLEXIBLE), DIAGNOSTIC N/A 4/19/2022    Procedure: BRONCHOSCOPY, WITH BRONCHOALVEOLAR LAVAGE AND BIOPSY;  Surgeon: Sherine Merrill MD;  Location:  GI     BRONCHOSCOPY (RIGID OR FLEXIBLE), DIAGNOSTIC N/A 6/21/2022    Procedure: BRONCHOSCOPY, WITH BRONCHOALVEOLAR LAVAGE AND BIOPSY;  Surgeon: Aneesh Rubio MD;  Location:  GI     COLONOSCOPY W/ BIOPSIES AND POLYPECTOMY  07/21/2020     CV CORONARY ANGIOGRAM N/A 09/08/2021    Procedure: Coronary Angiogram with possible intervention;  Surgeon: Jovon Bullock MD;  Location:  HEART CARDIAC CATH LAB     CV RIGHT HEART CATH MEASUREMENTS RECORDED N/A 09/08/2021    Procedure: Right Heart Cath;  Surgeon: Jovon Bullock MD;  Location:  HEART CARDIAC CATH LAB     ESOPHAGOSCOPY, GASTROSCOPY, DUODENOSCOPY (EGD), COMBINED N/A 10/23/2021    Procedure: ESOPHAGOGASTRODUODENOSCOPY (EGD);  Surgeon: Miquel Pisano MD;  Location:  GI     ESOPHAGOSCOPY, GASTROSCOPY, DUODENOSCOPY (EGD), COMBINED N/A 11/02/2021    Procedure: ESOPHAGOGASTRODUODENOSCOPY (EGD);  Surgeon: Daniel Ortiz MD;  Location:  GI     ESOPHAGOSCOPY, GASTROSCOPY, DUODENOSCOPY (EGD), COMBINED N/A 11/5/2021    Procedure: ESOPHAGOGASTRODUODENOSCOPY (EGD);  Surgeon: Ronnell Hernandez MD;  Location:  GI     EXTRACTION(S) DENTAL N/A 09/22/2021    Procedure: EXTRACTION tooth #19;  Surgeon: Deepak Tobin DDS;  Location:  OR     HERNIA REPAIR       IR CHEST TUBE PLACEMENT NON-TUNNELLED LEFT  11/03/2021     PICC TRIPLE LUMEN PLACEMENT Left 11/04/2021    5FR TL PICC. Right non occlusive thrombus subclavian vein.     REPLACE GASTROJEJUNOSTOMY TUBE, PERCUTANEOUS N/A 11/9/2021    Procedure: REPLACEMENT, GASTROJEJUNOSTOMY TUBE,  "PERCUTANEOUS;  Surgeon: Hernando Rodriguez MD;  Location: [facility] GI     right acl       TRACHEOSTOMY PERCUTANEOUS N/A 10/29/2021    Procedure: Percutaneous Tracheostomy,;  Surgeon: Celine Jenkins MD;  Location: [facility] OR     TRANSPLANT LUNG RECIPIENT SINGLE X2 Bilateral 10/16/2021    Procedure: TRANSPLANT, LUNG, RECIPIENT, BILATERAL, Bronchoscopy, on-pump perfusion, bilateral clamshell sternotomy;  Surgeon: Yanick Corral MD;  Location: [facility] OR     XR ACROMIOCLAVICULAR JOINT BILATERAL         Social History     Socioeconomic History     Marital status: Single     Spouse name: Not on file     Number of children: Not on file     Years of education: Not on file     Highest education level: Not on file   Occupational History     Not on file   Tobacco Use     Smoking status: Never     Smokeless tobacco: Never   Substance and Sexual Activity     Alcohol use: Never     Drug use: Never     Sexual activity: Not on file   Other Topics Concern     Parent/sibling w/ CABG, MI or angioplasty before 65F 55M? Not Asked   Social History Narrative     Not on file     Social Determinants of Health     Financial Resource Strain: Not on file   Food Insecurity: Not on file   Transportation Needs: Not on file   Physical Activity: Not on file   Stress: Not on file   Social Connections: Not on file   Intimate Partner Violence: Not on file   Housing Stability: Not on file         /87 (BP Location: Left arm, Patient Position: Sitting, Cuff Size: Adult Regular)   Pulse 68   Ht 1.626 m (5' 4.02\")   Wt 73 kg (161 lb)   SpO2 97%   BMI 27.62 kg/m    Body mass index is 27.62 kg/m .  Exam:   GENERAL APPEARANCE: Well developed, well nourished, alert, and in no apparent distress.  EYES: PERRL, EOMI  EARS: Canals clear, TMs normal  MOUTH: Oral mucosa is moist, without any lesions, no tonsillar enlargement, no oropharyngeal exudate. Faint tan coating on tongue.  NECK: supple, no masses, no thyromegaly.  LYMPHATICS: No significant " axillary, cervical, or supraclavicular nodes.  RESP: normal percussion, good air flow throughout.  No crackles. No rhonchi. No wheezes.  CV: Normal S1, S2, regular rhythm, normal rate. No murmur.  No rub. No gallop. No LE edema.   ABDOMEN:  Bowel sounds normal, soft, nontender, no HSM or masses.   MS: extremities normal. No clubbing. No cyanosis.  SKIN: no rash on limited exam  NEURO: Mentation intact, speech normal, normal strength and tone, normal gait and stance  PSYCH: mentation appears normal. and affect normal/bright  Results:  Recent Results (from the past 168 hour(s))   Basic metabolic panel    Collection Time: 12/16/22  9:40 AM   Result Value Ref Range    Glucose (External) 104 (H) 70 - 99 mg/dL    Urea Nitrogen (External) 15 6 - 22 mg/dL    Creatinine (External) 1.38 (H) 0.73 - 1.18 mg/dL    BUN/Creatinine Ratio (External) 10.9 10.0 - 25.0    Sodium (External) 144 136 - 145 meq/L    Potassium (External) 4.7 3.5 - 5.1 meq/L    Chloride (External) (External) 109 98 - 109 meq/L    CO2 (External) 23 20 - 29 meq/L    Anion Gap (External) 17 6 - 20 meq/L    Calcium (External) 10.0 8.5 - 10.5 mg/dL    GFR Estimated (External) 60 >=60 ml/min/1.73m2   INR    Collection Time: 12/16/22  9:40 AM   Result Value Ref Range    PT - Prothrombine Time (External) 24.3 (H) 12.0 - 14.5 secs    INR (External) 2.2 (H) 0.9 - 1.1   Magnesium    Collection Time: 12/16/22  9:40 AM   Result Value Ref Range    Magnesium (External) 1.6 1.6 - 2.6 mg/dL   CBC with Platelets & Differential    Collection Time: 12/16/22  9:40 AM   Result Value Ref Range    WBC Count (External) 6.6 4.0 - 11.0 K/uL    RBC Count (External) 4.79 4.40 - 5.80 M/uL    Hemoglobin (External) 12.1 (L) 13.5 - 17.5 g/dL    Hematocrit (External) 40.4 40.0 - 50.0 %    MCV (External) 84.3 80.0 - 98.0 fL    MCH (External) 25.3 (L) 25.5 - 34.0 pg    MCHC (External) 30.0 (L) 31.5 - 36.5 g/dL    RDW (External) 14.7 11.5 - 15.5 %    Platelet Count (External) 222 140 - 400 K/uL     Absolute Neutrophils (External) 4.1 1.8 - 8.0 K/uL    Absolute Lymphocytes (External) 1.6 0.8 - 4.1 K/uL    Absolute Monocytes (External) 0.7 0.0 - 1.0 K/uL    Absolute Eosinophils (External) 0.1 0.0 - 0.7 K/uL    Absolute Basophils (External) 0.1 0.0 - 0.2 K/uL    % Neutrophils (External) 62.1 %    % Lymphocytes (External) 23.7 %    % Monocytes (External) 11.1 %    % Eosinophils (External) 1.1 %    % Basophils (External) 2.1 %   INR    Collection Time: 12/20/22  7:46 AM   Result Value Ref Range    INR 2.37 (H) 0.85 - 1.15   CBC with platelets    Collection Time: 12/20/22  7:46 AM   Result Value Ref Range    WBC Count 5.9 4.0 - 11.0 10e3/uL    RBC Count 4.53 4.40 - 5.90 10e6/uL    Hemoglobin 11.4 (L) 13.3 - 17.7 g/dL    Hematocrit 37.6 (L) 40.0 - 53.0 %    MCV 83 78 - 100 fL    MCH 25.2 (L) 26.5 - 33.0 pg    MCHC 30.3 (L) 31.5 - 36.5 g/dL    RDW 14.6 10.0 - 15.0 %    Platelet Count 215 150 - 450 10e3/uL   Magnesium    Collection Time: 12/20/22  7:46 AM   Result Value Ref Range    Magnesium 1.8 1.7 - 2.3 mg/dL   Basic metabolic panel    Collection Time: 12/20/22  7:46 AM   Result Value Ref Range    Sodium 149 (H) 136 - 145 mmol/L    Potassium 4.0 3.4 - 5.3 mmol/L    Chloride 111 (H) 98 - 107 mmol/L    Carbon Dioxide (CO2) 27 22 - 29 mmol/L    Anion Gap 11 7 - 15 mmol/L    Urea Nitrogen 19.1 6.0 - 20.0 mg/dL    Creatinine 1.36 (H) 0.67 - 1.17 mg/dL    Calcium 9.9 8.6 - 10.0 mg/dL    Glucose 93 70 - 99 mg/dL    GFR Estimate 61 >60 mL/min/1.73m2   General PFT Lab (Please always keep checked)    Collection Time: 12/20/22  8:08 AM   Result Value Ref Range    FVC-Pred 3.59 L    FVC-Pre 2.46 L    FVC-%Pred-Pre 68 %    FEV1-Pre 1.61 L    FEV1-%Pred-Pre 56 %    FEV1FVC-Pred 80 %    FEV1FVC-Pre 66 %    FEFMax-Pred 8.24 L/sec    FEFMax-Pre 5.34 L/sec    FEFMax-%Pred-Pre 64 %    FEF2575-Pred 2.62 L/sec    FEF2575-Pre 0.81 L/sec    OFT4890-%Pred-Pre 30 %    ExpTime-Pre 8.37 sec    FIFMax-Pre 4.13 L/sec    FEV1FEV6-Pred 79 %     FEV1FEV6-Pre 66 %       Results as noted above.    PFT Interpretation:  Moderate obstructive ventilatory defect.  Increased from previous.  Best since lung transplantation  Valid Maneuver                Transplant Coordinator Note    Reason for visit: Post lung transplant follow up visit   Coordinator: Present   Caregiver: Peg BROWN     Health concerns addressed today:  1. Respiratory - no illnesses since last visit. A little more short of breath at rest - crept up slowly. Getting winded with activities (able to get up a flight of stairs without stopping) - at baseline.   2. Has fallen 4 times since last visit (2 weeks ago) - chest pain, some chest popping intermittently.   3.  GI: poor appetite (don't feel hungry, same as before transplant) - eating. A little nausea  4. ENT: clear drainage, otherwise no issues. At baseline.   5. No new visual changes/double vision  6. Vertigo  7. Pain on tongue/dry mouth.     Activity/rehab: up ad [place], walking 4 blocks most days of the week  Oxygen needs: room air  Pain management/RX: joint/bone pain (wrist and fingers), sees rheumatology next week. Some swelling. Using Volteran Gel.   Diabetic management: on lantus, occasional novolog  Next Bronch due: 9/13/20222  CMV status: D-/R-  EBV status: D+/R+  AC/asa: on coumadin, bridged to Lovenox for bronch (lower dose per CrCl d/t bleeding post bronch x 2)  PJP prophylactic: Bactrim     COVID:  1. COVID-19 infection (yes/no, date of most recent positive test): no  2. Status/instructions given about COVID-19 vaccine: Has received full dose of Evusheld 1/7/2022 and 4/18/22. Received Evusheld 10/18/2022 locally. Due 2/2023 for the new COVID vaccine booster      Labs, CXR, PFTs reviewed with patient  Medication record reviewed and reconciled  Questions and concerns addressed     Vaccines - patient will get flu vaccine today (11/14/2022).   Future vaccines, plan for Prevenar 20 in 1 month (December 2022). Patient should get Hepatitis B  vaccine series after getting Prevenar.     Health Maintenance:     Last colonoscopy:     Next colonoscopy due: due    Dermatology:    Vaccinations this visit: Prevenar 20 today.     Labs, CXR, PFTs reviewed with patient  Medication record reviewed and reconciled  Questions and concerns addressed    Patient Instructions  1. Continue to hydrate with 60-70 oz fluids daily.  2. Continue to exercise daily or most days of the week.  3. Follow up with your primary care provider for annual gender health maintenance procedures.  4. Follow up with colonoscopy schedule.  5. Follow up with annual dermatology visits.  6. It doesn't seem like the COVID vaccine is working well in lung transplant patients. A number of lung transplant patients have gotten sick with COVID even after receiving the vaccines.  Based on our recent experience, it can be life-threatening to get COVID  even after being vaccinated. Please continue to act like you did not get the COVID vaccine - social distancing, wearing a mask, good hand hygiene, etc. If the people around you are vaccinated, it will help reduce the risk of you getting COVID. All members of your household should be vaccinated.  7. See if there is a vertigo clinic in [place]. Also see the eye doctor.   8. See the dentist for your tongue/dry mouth issues.     Next transplant clinic appointment: 4 months with CXR, labs and PFTs  Next lab draw: pending tacrolimus level, otherwise monthly      AVS printed at time of check out            Again, thank you for allowing me to participate in the care of your patient.        Sincerely,        Abiodun Woods MD

## 2022-12-20 NOTE — PROGRESS NOTES
ANTICOAGULATION MANAGEMENT     Edson Thornton 57 year old male is on warfarin with therapeutic INR result. (Goal INR 2.0-3.0)    Recent labs: (last 7 days)     12/20/22  0746   INR 2.37*       ASSESSMENT       Source(s): Chart Review    Previous INR was Therapeutic last 2(+) visits    Medication, diet, health changes since last INR chart reviewed; none identified           PLAN     Recommended plan for no diet, medication or health factor changes affecting INR     Dosing Instructions: Continue your current warfarin dose with next INR in 1-2 weeks       Summary  As of 12/20/2022    Full warfarin instructions:  5 mg every Tue, Fri; 4 mg all other days; Starting 12/20/2022   Next INR check:  12/30/2022             Detailed voice message left for Bret with dosing instructions and follow up date.   Sent [employer] message with dosing and follow up instructions    Patient using outside facility for labs    Education provided:     Please call back if any changes to your diet, medications or how you've been taking warfarin    Contact [phone] with any changes, questions or concerns.     Plan made per [facility] anticoagulation protocol    ESSIE COLÓN RN  Anticoagulation Clinic  12/20/2022    _______________________________________________________________________     Anticoagulation Episode Summary     Current INR goal:  2.0-3.0   TTR:  60.0 % (11.5 mo)   Target end date:  Indefinite   Send INR reminders to:  [facility]    Indications    Atrial fibrillation  unspecified type (H) [I48.91]           Comments:           Anticoagulation Care Providers     Provider Role Specialty Phone number    Abiodun Woods MD Referring Pulmonary Disease [phone]

## 2022-12-20 NOTE — NURSING NOTE
"Chief Complaint   Patient presents with     RECHECK     Follow-up s/p lung transplant 10/16/2021     /87 (BP Location: Left arm, Patient Position: Sitting, Cuff Size: Adult Regular)   Pulse 68   Ht 1.626 m (5' 4.02\")   Wt 73 kg (161 lb)   SpO2 97%   BMI 27.62 kg/m      Tammie Prakash on 12/20/2022 at 8:47 AM    "

## 2022-12-20 NOTE — PATIENT INSTRUCTIONS
Patient Instructions  1. Continue to hydrate with 60-70 oz fluids daily.  2. Continue to exercise daily or most days of the week.  3. Follow up with your primary care provider for annual gender health maintenance procedures.  4. Follow up with colonoscopy schedule.  5. Follow up with annual dermatology visits.  6. It doesn't seem like the COVID vaccine is working well in lung transplant patients. A number of lung transplant patients have gotten sick with COVID even after receiving the vaccines.  Based on our recent experience, it can be life-threatening to get COVID  even after being vaccinated. Please continue to act like you did not get the COVID vaccine - social distancing, wearing a mask, good hand hygiene, etc. If the people around you are vaccinated, it will help reduce the risk of you getting COVID. All members of your household should be vaccinated.  7. See if there is a vertigo clinic in [place]. Also see the eye doctor.   8. See the dentist for your tongue/dry mouth issues.   9. You should get the COVID Bivalent vaccine in February 2023.       Next transplant clinic appointment: 4 months with CXR, labs and PFTs  Next lab draw: pending tacrolimus level, otherwise monthly      ~~~~~~~~~~~~~~~~~~~~~~~~~    Thoracic Transplant Office phone [phone], fax [phone]    Office Hours 8:30 - 5:00     For after-hours urgent issues, please dial [phone], and ask to speak with the Thoracic Transplant Coordinator On-Call.  --------------------  To expedite your medication refill(s), please contact your pharmacy and have them fax a refill request to: [phone]  .   *Please allow 3 business days for routine medication refills.  *Please allow 5 business days for controlled substance medication refills.    **For Diabetic medications and supplies refill(s), please contact your pharmacy and have them contact your Endocrine team.  --------------------  For scheduling appointments call  [phone].  --------------------  Please Note: If you are active on [employer], all future test results will be sent by [employer] message only, and will no longer be called to patient. You may also receive communication directly from your physician.

## 2022-12-21 LAB — EBV DNA # SPEC NAA+PROBE: NOT DETECTED COPIES/ML

## 2022-12-21 NOTE — PROGRESS NOTES
Calorie Count  Intake recorded for: 12/7  Total Kcals: 0 Total Protein: 0g  Kcals from Hospital Food: 0   Protein: 0g  Kcals from Outside Food (average):0 Protein: 0g  # Meals Ordered from Kitchen: 1 small meal   # Meals Recorded: no intake recorded.   # Supplements Recorded: no intake recorded.      >3+/5 due to functional observation against gravity

## 2022-12-22 LAB
DONOR IDENTIFICATION: NORMAL
DQB5: 1533
DSA COMMENTS: NORMAL
DSA PRESENT: YES
DSA TEST METHOD: NORMAL
ORGAN: NORMAL
SA 1 CELL: NORMAL
SA 1 TEST METHOD: NORMAL
SA 2 CELL: NORMAL
SA 2 TEST METHOD: NORMAL
SA1 HI RISK ABY: NORMAL
SA1 MOD RISK ABY: NORMAL
SA2 HI RISK ABY: NORMAL
SA2 MOD RISK ABY: NORMAL
UNACCEPTABLE ANTIGENS: NORMAL
UNOS CPRA: 26
ZZZSA 1  COMMENTS: NORMAL
ZZZSA 2 COMMENTS: NORMAL

## 2022-12-22 NOTE — RESULT ENCOUNTER NOTE
DSA slightly increased from prior, but within the usual range the last several month. Appears to be monitoring ~monthly.

## 2022-12-23 ENCOUNTER — ANTICOAGULATION THERAPY VISIT (OUTPATIENT)
Dept: ANTICOAGULATION | Facility: CLINIC | Age: 57
End: 2022-12-23

## 2022-12-23 DIAGNOSIS — I48.91 ATRIAL FIBRILLATION, UNSPECIFIED TYPE (H): Primary | ICD-10-CM

## 2022-12-23 NOTE — PROGRESS NOTES
ANTICOAGULATION MANAGEMENT     Edson Thornton 57 year old male is on warfarin with therapeutic INR result. (Goal INR 2.0-3.0)    Recent labs: (last 7 days)     12/23/22  0908   INR 2.8*       ASSESSMENT       Source(s): Patient/Caregiver Call       Warfarin doses taken: Warfarin taken as instructed    Diet: No new diet changes identified    New illness, injury, or hospitalization: No    Medication/supplement changes: Off Wellbutrin and starting today on Lamotrigine     Signs or symptoms of bleeding or clotting: No    Previous INR: Therapeutic last 2(+) visits    Additional findings: None       PLAN     Recommended plan for no diet, medication or health factor changes affecting INR     Dosing Instructions: Continue your current warfarin dose with next INR in 1 week       Summary  As of 12/23/2022    Full warfarin instructions:  5 mg every Tue, Fri; 4 mg all other days   Next INR check:  12/30/2022             Telephone call with Bret who verbalizes understanding and agrees to plan and who agrees to plan and repeated back plan correctly    Patient using outside facility for labs    Education provided:     Interaction IS NOT anticipated between warfarin and Lamotrigine     Plan made per [facility] anticoagulation protocol    Suni Choi RN  Anticoagulation Clinic  12/23/2022    _______________________________________________________________________     Anticoagulation Episode Summary     Current INR goal:  2.0-3.0   TTR:  60.4 % (11.6 mo)   Target end date:  Indefinite   Send INR reminders to:  [facility]    Indications    Atrial fibrillation  unspecified type (H) [I48.91]           Comments:           Anticoagulation Care Providers     Provider Role Specialty Phone number    Abiodun Woods MD Referring Pulmonary Disease [phone]

## 2022-12-30 ENCOUNTER — LAB (OUTPATIENT)
Dept: LAB | Facility: CLINIC | Age: 57
End: 2022-12-30
Payer: COMMERCIAL

## 2022-12-30 DIAGNOSIS — Z94.2 S/P LUNG TRANSPLANT (H): Primary | ICD-10-CM

## 2022-12-30 PROCEDURE — 80197 ASSAY OF TACROLIMUS: CPT

## 2023-01-03 LAB
TACROLIMUS BLD-MCNC: 7.9 UG/L (ref 5–15)
TME LAST DOSE: NORMAL H
TME LAST DOSE: NORMAL H

## 2023-01-03 NOTE — RESULT ENCOUNTER NOTE
Tacrolimus level 7.9 at 12.5 hours, on 12/30/22.  Goal 8-10.   Current dose 2 mg in AM, 2 mg in PM    No change in dose as patient likely close to goal at 12 hours  [employer] message sent

## 2023-01-05 ENCOUNTER — DOCUMENTATION ONLY (OUTPATIENT)
Dept: ANTICOAGULATION | Facility: CLINIC | Age: 58
End: 2023-01-05

## 2023-01-05 ENCOUNTER — ANTICOAGULATION THERAPY VISIT (OUTPATIENT)
Dept: ANTICOAGULATION | Facility: CLINIC | Age: 58
End: 2023-01-05

## 2023-01-05 DIAGNOSIS — I48.91 ATRIAL FIBRILLATION, UNSPECIFIED TYPE (H): Primary | ICD-10-CM

## 2023-01-05 DIAGNOSIS — Z79.899 ENCOUNTER FOR LONG-TERM (CURRENT) USE OF HIGH-RISK MEDICATION: ICD-10-CM

## 2023-01-05 NOTE — PROGRESS NOTES
ANTICOAGULATION MANAGEMENT     Edson Thornton 57 year old male is on warfarin with therapeutic INR result. (Goal INR 2.0-3.0)    Recent labs: (last 7 days)     12/30/22  0925   INR 2.2*       ASSESSMENT       Source(s): Chart Review and Patient/Caregiver Call       Warfarin doses taken: Warfarin taken as instructed    Diet: No new diet changes identified    New illness, injury, or hospitalization: No    Medication/supplement changes: None noted    Signs or symptoms of bleeding or clotting: No    Previous INR: Therapeutic last 2(+) visits    Additional findings: None       PLAN     Recommended plan for no diet, medication or health factor changes affecting INR     Dosing Instructions: Continue your current warfarin dose with next INR in 1 day. Pt takes INR weekly, we received INR from 12/30/22 today at [facility]. Bret plans to have INR checked tomorrow.     Summary  As of 1/5/2023    Full warfarin instructions:  5 mg every Tue, Fri; 4 mg all other days   Next INR check:  1/6/2023             Telephone call with Bret who verbalizes understanding and agrees to plan    Patient using outside facility for labs    Education provided:     Goal range and lab monitoring: goal range and significance of current result     Plan made per [facility] anticoagulation protocol    Jerald Garcia RN  Anticoagulation Clinic  1/5/2023    _______________________________________________________________________     Anticoagulation Episode Summary     Current INR goal:  2.0-3.0   TTR:  61.1 % (11.9 mo)   Target end date:  Indefinite   Send INR reminders to:  [facility]    Indications    Atrial fibrillation  unspecified type (H) [I48.91]           Comments:           Anticoagulation Care Providers     Provider Role Specialty Phone number    Abiodun Woods MD Referring Pulmonary Disease [phone]

## 2023-01-05 NOTE — PROGRESS NOTES
ANTICOAGULATION CLINIC REFERRAL RENEWAL REQUEST       An annual renewal order is required for all patients referred to [facility]ticoagulation [facility].?  Please review and sign the pended referral order for Edson Thornton.       ANTICOAGULATION SUMMARY      Warfarin indication(s)   Atrial Fibrillation    Mechanical heart valve present?  NO       Current goal range   INR: 2.0-3.0     Goal appropriate for indication? Goal INR 2-3, standard for indication(s) above     Time in Therapeutic Range (TTR)  (Goal > 60%) 61.1%       Office visit with referring provider's group within last year yes on 12/20/22       Jerald Garcia RN  [facility]ticoagulation [facility]

## 2023-01-09 ENCOUNTER — TELEPHONE (OUTPATIENT)
Dept: ANTICOAGULATION | Facility: CLINIC | Age: 58
End: 2023-01-09

## 2023-01-09 ENCOUNTER — TELEPHONE (OUTPATIENT)
Dept: TRANSPLANT | Facility: CLINIC | Age: 58
End: 2023-01-09

## 2023-01-09 LAB — INR (EXTERNAL): 2.5 (ref 0.9–1.1)

## 2023-01-09 NOTE — TELEPHONE ENCOUNTER
Patient is currently hospitalized at [facility] for mental health.  [facility] will follow up once patient is discharged.

## 2023-01-09 NOTE — TELEPHONE ENCOUNTER
[facility] inpatient pharmacy called would like to confirm the dose on the Mycophenolate, Tacrolimus, and the Prednisone.

## 2023-01-11 ENCOUNTER — TELEPHONE (OUTPATIENT)
Dept: ANTICOAGULATION | Facility: CLINIC | Age: 58
End: 2023-01-11

## 2023-01-11 DIAGNOSIS — I48.91 ATRIAL FIBRILLATION, UNSPECIFIED TYPE (H): Primary | ICD-10-CM

## 2023-01-11 NOTE — TELEPHONE ENCOUNTER
ANTICOAGULATION  MANAGEMENT: Discharge Review    Edson Thornton chart reviewed for anticoagulation continuity of care    Hospital Admission on 1/7-1/11 for Suicidal ideation.    Discharge disposition: Home    Results:    Recent labs: (last 7 days)     01/06/23  0923   INR 2.6*     Anticoagulation inpatient management:     home regimen continued    Anticoagulation discharge instructions:     Warfarin dosing: home regimen continued   Bridging: No   INR goal change: No      Medication changes affecting anticoagulation: Yes: Wellbutrin and Duloxetine were discontinued.  Prozac and Remeron were started    Additional factors affecting anticoagulation: Yes: Behavioral guy issues     PLAN     Agree with discharge plan for follow up on 1/12/23    Spoke with Discharge pharmacist and patient is not contacted    Anticoagulation Calendar updated    Terrie Cedillo RN

## 2023-01-16 ENCOUNTER — ANTICOAGULATION THERAPY VISIT (OUTPATIENT)
Dept: ANTICOAGULATION | Facility: CLINIC | Age: 58
End: 2023-01-16

## 2023-01-16 DIAGNOSIS — I48.91 ATRIAL FIBRILLATION, UNSPECIFIED TYPE (H): Primary | ICD-10-CM

## 2023-01-16 NOTE — PROGRESS NOTES
ANTICOAGULATION MANAGEMENT     Edson Thornton 57 year old male is on warfarin with therapeutic INR result. (Goal INR 2.0-3.0)    Recent labs: (last 7 days)     01/13/23  0813   INR 2.5*       ASSESSMENT       Source(s): Patient/Caregiver Call       Warfarin doses taken: Warfarin taken as instructed    Diet: No new diet changes identified    New illness, injury, or hospitalization: Yes: recent discharged from the outside hospital due to suicidal ideation    Medication/supplement changes: Cymbalta and Ambian discontinue. Patient is started on Prozac    Signs or symptoms of bleeding or clotting: No    Previous INR: Therapeutic last 2(+) visits    Additional findings: None       PLAN     Recommended plan for no diet, medication or health factor changes affecting INR     Dosing Instructions: Continue your current warfarin dose with next INR in 1 week       Summary  As of 1/16/2023    Full warfarin instructions:  5 mg every Tue, Fri; 4 mg all other days   Next INR check:  1/20/2023             Telephone call with Bret who verbalizes understanding and agrees to plan and who agrees to plan and repeated back plan correctly    Patient using outside facility for labs    Education provided:     None required    Plan made per [facility] anticoagulation protocol    Suni Choi, RN  Anticoagulation Clinic  1/16/2023    _______________________________________________________________________     Anticoagulation Episode Summary     Current INR goal:  2.0-3.0   TTR:  62.0 % (1 y)   Target end date:  Indefinite   Send INR reminders to:  [facility]    Indications    Atrial fibrillation  unspecified type (H) [I48.91]           Comments:           Anticoagulation Care Providers     Provider Role Specialty Phone number    Abiodun Woods MD Referring Pulmonary Disease [phone]

## 2023-01-18 DIAGNOSIS — Z94.2 S/P LUNG TRANSPLANT (H): Chronic | ICD-10-CM

## 2023-01-18 RX ORDER — MULTIPLE VITAMINS W/ MINERALS TAB 9MG-400MCG
1 TAB ORAL DAILY
Qty: 30 TABLET | Refills: 11 | Status: SHIPPED | OUTPATIENT
Start: 2023-01-18 | End: 2023-11-22

## 2023-01-23 ENCOUNTER — ANTICOAGULATION THERAPY VISIT (OUTPATIENT)
Dept: ANTICOAGULATION | Facility: CLINIC | Age: 58
End: 2023-01-23
Payer: COMMERCIAL

## 2023-01-23 DIAGNOSIS — I48.91 ATRIAL FIBRILLATION, UNSPECIFIED TYPE (H): Primary | ICD-10-CM

## 2023-01-23 NOTE — PROGRESS NOTES
ANTICOAGULATION MANAGEMENT     Edson Thornton 57 year old male is on warfarin with subtherapeutic INR result. (Goal INR 2.0-3.0)    Recent labs: (last 7 days)     01/20/23  0853   INR 1.8*       ASSESSMENT       Source(s): Patient/Caregiver Call       Warfarin doses taken: Warfarin taken as instructed and but patient is unsure what doses of Warfarin was given when he was hospitlaized     Diet: No new diet changes identified    New illness, injury, or hospitalization: No    Medication/supplement changes: None noted    Signs or symptoms of bleeding or clotting: No    Previous INR: Therapeutic last 2(+) visits    Additional findings: None       PLAN     Recommended plan for temporary change(s) affecting INR     Dosing Instructions: booster dose then continue your current warfarin dose with next INR in 1 week       Summary  As of 1/23/2023    Full warfarin instructions:  1/23: 5 mg; Otherwise 5 mg every Tue, Fri; 4 mg all other days   Next INR check:  1/27/2023             Telephone call with [place] who verbalizes understanding and agrees to plan and who agrees to plan and repeated back plan correctly    Patient using outside facility for labs    Education provided:     None required    Plan made per [facility] anticoagulation protocol    Suni Choi RN  Anticoagulation Clinic  1/23/2023    _______________________________________________________________________     Anticoagulation Episode Summary     Current INR goal:  2.0-3.0   TTR:  63.2 % (1 y)   Target end date:  Indefinite   Send INR reminders to:  [facility]    Indications    Atrial fibrillation  unspecified type (H) [I48.91]           Comments:           Anticoagulation Care Providers     Provider Role Specialty Phone number    Abiodun Woods MD Referring Pulmonary Disease [phone]

## 2023-01-27 ENCOUNTER — LAB (OUTPATIENT)
Dept: LAB | Facility: CLINIC | Age: 58
End: 2023-01-27
Payer: COMMERCIAL

## 2023-01-27 DIAGNOSIS — Z94.2 S/P LUNG TRANSPLANT (H): Primary | ICD-10-CM

## 2023-01-27 PROCEDURE — 80197 ASSAY OF TACROLIMUS: CPT

## 2023-01-30 ENCOUNTER — ANTICOAGULATION THERAPY VISIT (OUTPATIENT)
Dept: ANTICOAGULATION | Facility: CLINIC | Age: 58
End: 2023-01-30
Payer: COMMERCIAL

## 2023-01-30 ENCOUNTER — TELEPHONE (OUTPATIENT)
Dept: TRANSPLANT | Facility: CLINIC | Age: 58
End: 2023-01-30

## 2023-01-30 DIAGNOSIS — I48.91 ATRIAL FIBRILLATION, UNSPECIFIED TYPE (H): Primary | ICD-10-CM

## 2023-01-30 LAB
TACROLIMUS BLD-MCNC: 7.2 UG/L (ref 5–15)
TME LAST DOSE: NORMAL H
TME LAST DOSE: NORMAL H

## 2023-01-30 NOTE — PROGRESS NOTES
ANTICOAGULATION MANAGEMENT     Edson Thornton 57 year old male is on warfarin with therapeutic INR result. (Goal INR 2.0-3.0)    Recent labs: (last 7 days)     01/27/23  0910   INR 2.0*       ASSESSMENT       Source(s): Chart Review and Patient/Caregiver Call       Warfarin doses taken: Warfarin taken as instructed    Diet: No new diet changes identified    New illness, injury, or hospitalization: No    Medication/supplement changes: None noted    Signs or symptoms of bleeding or clotting: No    Previous INR: Subtherapeutic    Additional findings: hanging in the lower range or sub despite increase. pt and writer agreed on a 3% increase       PLAN     Recommended plan for no diet, medication or health factor changes affecting INR     Dosing Instructions: Increase your warfarin dose (3% change) with next INR in 1 week       Summary  As of 1/30/2023    Full warfarin instructions:  5 mg every Mon, Wed, Fri; 4 mg all other days   Next INR check:  2/3/2023             Telephone call with Bret who verbalizes understanding and agrees to plan and who agrees to plan and repeated back plan correctly  Sent [employer] message with dosing and follow up instructions    Patient using outside facility for labs    Education provided:     Goal range and lab monitoring: goal range and significance of current result    Plan made per [facility] anticoagulation protocol    Dionne Lopez, RN  Anticoagulation Clinic  1/30/2023    _______________________________________________________________________     Anticoagulation Episode Summary     Current INR goal:  2.0-3.0   TTR:  63.3 % (1 y)   Target end date:  Indefinite   Send INR reminders to:  [facility]    Indications    Atrial fibrillation  unspecified type (H) [I48.91]           Comments:           Anticoagulation Care Providers     Provider Role Specialty Phone number    Abiodun Woods MD Referring Pulmonary Disease [phone]

## 2023-01-30 NOTE — TELEPHONE ENCOUNTER
RECORDS RECEIVED FROM: internal /ce   DATE RECEIVED: 4.25.23    NOTES (FOR ALL VISITS) STATUS DETAILS   OFFICE NOTES from referring provider internal  Dr. Woods,   OFFICE NOTES from other specialist internal  [facility]        MEDICATION LIST internal     IMAGING      DEXASCAN internal  11.14.22   MRI (BRAIN) internal  10.26.21, 10.23.21,    XR (Chest) internal /ce Internal 12.20.22, 11.14.22, 9.12.22, 6.22.22, 6.21.22 more in [facility]- 9.3.21, 8.30.21, 7/19/21, 6.28.21   CT (HEAD/NECK/CHEST/ABDOMEN) internal /ce Internal 2.28.22, 2.1.22, 1.31.22 1.19.22 more in [facility]- 12.6.22, 8.30.21, 7/28/21, 7/19/21, 6.28.21     ULTRASOUND (HEAD/NECK) internal     LABS     DIABETES: HBGA1C, CREATININE, FASTING LIPIDS, MICROALBUMIN URINE, POTASSIUM, TSH, T4    THYROID: TSH, T4, CBC, THYRODLONULIN, TOTAL T3, FREE T4, CALCITONIN, CEA internal /ce

## 2023-01-30 NOTE — TELEPHONE ENCOUNTER
Patient Call: General  Route to LPN    Reason for call: called in regards of recent lab results and would like to go over. Call back number [phone].     Call back needed? Yes    Return Call Needed  Same as documented in contacts section  When to return call?: Same day: Route [facility]

## 2023-01-31 ENCOUNTER — TELEPHONE (OUTPATIENT)
Dept: TRANSPLANT | Facility: CLINIC | Age: 58
End: 2023-01-31
Payer: COMMERCIAL

## 2023-01-31 DIAGNOSIS — D84.9 IMMUNOSUPPRESSED STATUS (H): ICD-10-CM

## 2023-01-31 DIAGNOSIS — Z94.2 S/P LUNG TRANSPLANT (H): ICD-10-CM

## 2023-01-31 RX ORDER — TACROLIMUS 1 MG/1
2 CAPSULE ORAL 2 TIMES DAILY
Qty: 120 CAPSULE | Refills: 11 | Status: ON HOLD
Start: 2023-01-31 | End: 2023-03-14

## 2023-01-31 RX ORDER — TACROLIMUS 0.5 MG/1
0.5 CAPSULE ORAL DAILY
Qty: 30 CAPSULE | Refills: 11 | Status: ON HOLD | OUTPATIENT
Start: 2023-01-31 | End: 2023-03-14

## 2023-01-31 NOTE — TELEPHONE ENCOUNTER
Tacrolimus level 7.2 at 12 hours, on 1/27/23.  Goal 8-10.   Current dose 2 mg in AM, 2 mg in PM    Dose changed to 2 mg in AM, 2.5 mg in PM   Recheck level in a week    Discussed with patient    MyChart message sent

## 2023-01-31 NOTE — PROGRESS NOTES
K+ continues to be 5.2    Will continue to follow low potassium diet. Hydrate. Will continue to check potassium monthly.

## 2023-02-07 PROCEDURE — 80197 ASSAY OF TACROLIMUS: CPT | Performed by: INTERNAL MEDICINE

## 2023-02-08 ENCOUNTER — ANTICOAGULATION THERAPY VISIT (OUTPATIENT)
Dept: ANTICOAGULATION | Facility: CLINIC | Age: 58
End: 2023-02-08
Payer: COMMERCIAL

## 2023-02-08 DIAGNOSIS — I48.91 ATRIAL FIBRILLATION, UNSPECIFIED TYPE (H): Primary | ICD-10-CM

## 2023-02-08 NOTE — PROGRESS NOTES
ANTICOAGULATION MANAGEMENT     Edson Thornton 57 year old male is on warfarin with therapeutic INR result. (Goal INR 2.0-3.0)    Recent labs: (last 7 days)     02/07/23  0754   INR 2.0*       ASSESSMENT       Source(s): Chart Review and Patient/Caregiver Call       Warfarin doses taken: Warfarin taken as instructed    Diet: No new diet changes identified    New illness, injury, or hospitalization: No    Medication/supplement changes: None noted    Signs or symptoms of bleeding or clotting: No    Previous INR: Therapeutic last visit; previously outside of goal range    Additional findings: still hanging low despite small increase in INR dosing 2 weeks ago. writer and patient agreed to do a small increase again.       PLAN     Recommended plan for no diet, medication or health factor changes affecting INR     Dosing Instructions: Increase your warfarin dose (3.2% change) with next INR in 1 week       Summary  As of 2/8/2023    Full warfarin instructions:  4 mg every Sun, Tue, Fri; 5 mg all other days   Next INR check:  2/15/2023             Telephone call with Bret who verbalizes understanding and agrees to plan and who agrees to plan and repeated back plan correctly  Sent [employer] message with dosing and follow up instructions    Patient using outside facility for labs    Education provided:     Please call back if any changes to your diet, medications or how you've been taking warfarin    Contact [phone] with any changes, questions or concerns.     Plan made per [facility] anticoagulation protocol    Dionne Lopez RN  Anticoagulation Clinic  2/8/2023    _______________________________________________________________________     Anticoagulation Episode Summary     Current INR goal:  2.0-3.0   TTR:  65.9 % (1 y)   Target end date:  Indefinite   Send INR reminders to:  [facility]    Indications    Atrial fibrillation  unspecified type (H) [I48.91]           Comments:           Anticoagulation Care  Providers     Provider Role Specialty Phone number    Abiodun Woods MD Referring Pulmonary Disease [phone]

## 2023-02-11 ENCOUNTER — HEALTH MAINTENANCE LETTER (OUTPATIENT)
Age: 58
End: 2023-02-11

## 2023-02-11 ENCOUNTER — LAB (OUTPATIENT)
Dept: LAB | Facility: CLINIC | Age: 58
End: 2023-02-11
Payer: COMMERCIAL

## 2023-02-11 DIAGNOSIS — Z79.899 ENCOUNTER FOR LONG-TERM (CURRENT) USE OF HIGH-RISK MEDICATION: ICD-10-CM

## 2023-02-11 DIAGNOSIS — Z94.2 S/P LUNG TRANSPLANT (H): Chronic | ICD-10-CM

## 2023-02-11 DIAGNOSIS — D84.9 IMMUNOSUPPRESSED STATUS (H): ICD-10-CM

## 2023-02-11 DIAGNOSIS — E83.42 HYPOMAGNESEMIA: ICD-10-CM

## 2023-02-11 DIAGNOSIS — I10 PRIMARY HYPERTENSION: ICD-10-CM

## 2023-02-12 LAB
TACROLIMUS BLD-MCNC: 8.2 UG/L (ref 5–15)
TME LAST DOSE: NORMAL H
TME LAST DOSE: NORMAL H

## 2023-02-13 NOTE — RESULT ENCOUNTER NOTE
Tacrolimus level 8.2 at 12 hours, on 2/7/23.  Goal 7-9.   Current dose 2 mg in AM, 2.5 mg in PM    No dose change at goal please continue current dosing    [employer] message sent

## 2023-02-15 ENCOUNTER — TELEPHONE (OUTPATIENT)
Dept: TRANSPLANT | Facility: CLINIC | Age: 58
End: 2023-02-15
Payer: COMMERCIAL

## 2023-02-15 NOTE — TELEPHONE ENCOUNTER
Bret calls to report a fever of 102 that began about 6 AM. Pt also reports a cough with clear sputum production.   other symptoms reported include fatigue, headache, chills, nausea, and overall malaise.  Patient located in [place].  Instructed patient to be seen in primary care or urgent care to be swabbed for COVID and RVP.    Patient does report that his grandson recently had a cough, but thought it was due to allergies.  Oxygen sats 96 to 97% at baseline.  Patient will monitor oxygen saturations and report back with any changes.    Patient will update transplant team with updates of COVID and RVP test.

## 2023-02-16 ENCOUNTER — TELEPHONE (OUTPATIENT)
Dept: TRANSPLANT | Facility: CLINIC | Age: 58
End: 2023-02-16

## 2023-02-16 RX ORDER — MEPERIDINE HYDROCHLORIDE 25 MG/ML
25 INJECTION INTRAMUSCULAR; INTRAVENOUS; SUBCUTANEOUS EVERY 30 MIN PRN
Status: CANCELLED | OUTPATIENT
Start: 2023-02-16

## 2023-02-16 RX ORDER — ALBUTEROL SULFATE 90 UG/1
1-2 AEROSOL, METERED RESPIRATORY (INHALATION)
Status: CANCELLED
Start: 2023-02-16

## 2023-02-16 RX ORDER — NALOXONE HYDROCHLORIDE 0.4 MG/ML
0.2 INJECTION, SOLUTION INTRAMUSCULAR; INTRAVENOUS; SUBCUTANEOUS
Status: CANCELLED | OUTPATIENT
Start: 2023-02-16

## 2023-02-16 RX ORDER — HEPARIN SODIUM (PORCINE) LOCK FLUSH IV SOLN 100 UNIT/ML 100 UNIT/ML
5 SOLUTION INTRAVENOUS
Status: CANCELLED | OUTPATIENT
Start: 2023-02-16

## 2023-02-16 RX ORDER — ALBUTEROL SULFATE 0.83 MG/ML
2.5 SOLUTION RESPIRATORY (INHALATION)
Status: CANCELLED | OUTPATIENT
Start: 2023-02-16

## 2023-02-16 RX ORDER — HEPARIN SODIUM,PORCINE 10 UNIT/ML
5 VIAL (ML) INTRAVENOUS
Status: CANCELLED | OUTPATIENT
Start: 2023-02-16

## 2023-02-16 RX ORDER — EPINEPHRINE 1 MG/ML
0.3 INJECTION, SOLUTION, CONCENTRATE INTRAVENOUS EVERY 5 MIN PRN
Status: CANCELLED | OUTPATIENT
Start: 2023-02-16

## 2023-02-16 RX ORDER — METHYLPREDNISOLONE SODIUM SUCCINATE 125 MG/2ML
125 INJECTION, POWDER, LYOPHILIZED, FOR SOLUTION INTRAMUSCULAR; INTRAVENOUS
Status: CANCELLED
Start: 2023-02-16

## 2023-02-16 RX ORDER — DIPHENHYDRAMINE HYDROCHLORIDE 50 MG/ML
50 INJECTION INTRAMUSCULAR; INTRAVENOUS
Status: CANCELLED
Start: 2023-02-16

## 2023-02-16 NOTE — TELEPHONE ENCOUNTER
Writer placed follow up call.     Patient just leaving PCP office. Had covid, rsv, and influenza swab done.     Pt covid positive. Temp still , little SOB with activity, oxygen saturations 93-96%.     Per Dr. Woods:  -treat with IV remdesivir     Pt will get IV remdesivir through local PCP. Instructed patient to notify [facility] if symptoms worsen, increasing SOB, or oxygen saturations dropping. Pt instructed to monitor oxygen saturations a couple times throughout the day or if symptomatic. Will push fluids.

## 2023-02-16 NOTE — TELEPHONE ENCOUNTER
Kenna from Dr. Ferro office called to update [facility] that patient will receive treatment for Covid at [facility] no call back needed

## 2023-02-24 ENCOUNTER — ANTICOAGULATION THERAPY VISIT (OUTPATIENT)
Dept: ANTICOAGULATION | Facility: CLINIC | Age: 58
End: 2023-02-24
Payer: COMMERCIAL

## 2023-02-24 DIAGNOSIS — I48.91 ATRIAL FIBRILLATION, UNSPECIFIED TYPE (H): Primary | ICD-10-CM

## 2023-02-24 LAB — INR (EXTERNAL): 4 (ref 0.9–1.1)

## 2023-02-24 NOTE — PROGRESS NOTES
ANTICOAGULATION MANAGEMENT     Edson Thornton 57 year old male is on warfarin with supratherapeutic INR result. (Goal INR 2.0-3.0)    Recent labs: (last 7 days)     02/24/23  0000   INR 4.0*       ASSESSMENT       Source(s): Chart Review and Patient/Caregiver Call       Warfarin doses taken: Warfarin taken as instructed    Diet: No new diet changes identified    New illness, injury, or hospitalization: Yes: Pt dx with COVID on 2/16/23. Pt has an IV infusion of remdisivir    Medication/supplement changes: See above    Signs or symptoms of bleeding or clotting: No    Previous INR: Therapeutic last 2(+) visits    Additional findings: [facility] reviewed. Pt plans to recheck INR next Friday 3/3/23         PLAN     Recommended plan for temporary change(s) affecting INR     Dosing Instructions: Hold 2/24/23, 4 mg on 2/25/23,4 mg on 2/27/23 and 4 mg on 3/1/23 then 4 mg on Sundays,Tuesdays,Fridays and 5 mg all other days with next INR in 6-7 days       Summary  As of 2/24/2023    Full warfarin instructions:  2/24: Hold; 2/25: 4 mg; 2/27: 4 mg; 3/1: 4 mg; Otherwise 4 mg every Sun, Tue, Fri; 5 mg all other days   Next INR check:  3/3/2023             Telephone call with Bret who verbalizes understanding and agrees to plan ([employer] message sent)    Patient using outside facility for labs    Education provided:     Please call back if any changes to your diet, medications or how you've been taking warfarin    Contact [phone] with any changes, questions or concerns.     Plan made with [facility] Pharmacist Uhsa Ashley, RN  Anticoagulation Clinic  2/24/2023    _______________________________________________________________________     Anticoagulation Episode Summary     Current INR goal:  2.0-3.0   TTR:  65.9 % (1 y)   Target end date:  Indefinite   Send INR reminders to:  [facility]    Indications    Atrial fibrillation  unspecified type (H) [I48.91]           Comments:           Anticoagulation Care  Providers     Provider Role Specialty Phone number    Abiodun Woods MD Referring Pulmonary Disease [phone]

## 2023-02-27 ENCOUNTER — TELEPHONE (OUTPATIENT)
Dept: TRANSPLANT | Facility: CLINIC | Age: 58
End: 2023-02-27
Payer: COMMERCIAL

## 2023-02-27 NOTE — LETTER
PHYSICIAN ORDERS      DATE & TIME ISSUED: 2023 2:41 PM  PATIENT NAME: Edson Thornton   : 1965     Formerly [facility] MR# [if applicable]: [id number]     DIAGNOSIS:  Lung Transplant  Z94.2  Please draw bmp, magnesium, and venous blood gas week of 23      Any questions please call: Alfred [phone]    Please fax these results to [phone].      .

## 2023-02-27 NOTE — LETTER
PHYSICIAN ORDERS      DATE & TIME ISSUED: 2023 2:43 PM  PATIENT NAME: Edson Thornton   : 1965     [facility] MR# [if applicable]: [id number]     DIAGNOSIS:  Lung Transplant  Z94.2    Please collect Enteric Bacteria and Virus Panel by VISHAL stool, C. Difficile Toxin B PCR with relex to C. Difficile antigen by stool, and routine parasitology exam by stool     Any questions please call: Alfred [phone]    Please fax these results to [phone].      .

## 2023-02-28 ENCOUNTER — DOCUMENTATION ONLY (OUTPATIENT)
Dept: ANTICOAGULATION | Facility: CLINIC | Age: 58
End: 2023-02-28
Payer: COMMERCIAL

## 2023-02-28 ENCOUNTER — TELEPHONE (OUTPATIENT)
Dept: TRANSPLANT | Facility: CLINIC | Age: 58
End: 2023-02-28
Payer: COMMERCIAL

## 2023-02-28 DIAGNOSIS — Z94.2 S/P LUNG TRANSPLANT (H): Primary | ICD-10-CM

## 2023-02-28 DIAGNOSIS — Z94.2 S/P LUNG TRANSPLANT (H): ICD-10-CM

## 2023-02-28 RX ORDER — LEVOFLOXACIN 750 MG/1
750 TABLET, FILM COATED ORAL DAILY
Qty: 14 TABLET | Refills: 0 | Status: SHIPPED | OUTPATIENT
Start: 2023-02-28 | End: 2023-03-01

## 2023-02-28 NOTE — TELEPHONE ENCOUNTER
Chest feels heavy, SOB with activity (feels worse today). No SOB at rest. Pulse ox WDL.    Is bringing up yellow sputum now.    No fevers.     Stool studies:   -c.diff:  negative  -enteric panel: negative   -parasitology exam by stool- in process     Per Dr. Woods:   -Levaquin 750mg daily x 14 days  -Ok to use Imodium     Writer will check in with patient again on Friday. Pt knows to call transplant office if new or worsening symptoms.

## 2023-02-28 NOTE — PROGRESS NOTES
ANTICOAGULATION  MANAGEMENT     Interacting Medication Review    Interacting medication(s): Levofloxacin (Levaquin) with warfarin.    Duration: 14 days  (3/28 to 3/14)    Indication: Yellow Sputum    New medication?: Yes, interaction may increase INR and risk of bleeding. Closer INR monitoring recommended.        PLAN     Continue your current warfarin dose with next INR in 3 days              Patient was not contacted    No adjustment to anticoagulation calendar was required    Plan made per [facility] anticoagulation protocol    Suni Choi RN  Anticoagulation Clinic

## 2023-02-28 NOTE — TELEPHONE ENCOUNTER
Patient Call: General  Route to LPN    Reason for call: called in regards of touch base and follow up on recent lab results. Call back number is [phone].    Call back needed? Yes    Return Call Needed  Same as documented in contacts section  When to return call?: Same day: Route High Priority

## 2023-03-01 ENCOUNTER — DOCUMENTATION ONLY (OUTPATIENT)
Dept: ANTICOAGULATION | Facility: CLINIC | Age: 58
End: 2023-03-01
Payer: COMMERCIAL

## 2023-03-01 ENCOUNTER — TELEPHONE (OUTPATIENT)
Dept: TRANSPLANT | Facility: CLINIC | Age: 58
End: 2023-03-01
Payer: COMMERCIAL

## 2023-03-01 RX ORDER — LEVOFLOXACIN 750 MG/1
TABLET, FILM COATED ORAL
Qty: 14 TABLET | Refills: 0 | Status: ON HOLD | OUTPATIENT
Start: 2023-03-01 | End: 2023-03-14

## 2023-03-01 NOTE — TELEPHONE ENCOUNTER
Provider Call: General  Route to LPN    Reason for call: she talked with Alfred yesterday she has questions to review     Call back needed? Yes    Return Call Needed  Same as documented in contacts section  When to return call?: Greater than one day: Route standard priority

## 2023-03-01 NOTE — PROGRESS NOTES
ANTICOAGULATION  MANAGEMENT     Interacting Medication Review    Interacting medication(s): Levofloxacin (Levaquin) with warfarin.    Duration: 14 days  (3/1/23 to 3/15/23)    Indication: Prophylaxis    New medication?: Yes, interaction may increase INR and risk of bleeding. Closer INR monitoring recommended.        PLAN     Continue your current warfarin dose with next INR in 2 days              Mychart message sent     No adjustment to anticoagulation calendar was required    Plan made per [facility] anticoagulation protocol    ESSIE COLÓN RN  Anticoagulation Clinic

## 2023-03-01 NOTE — TELEPHONE ENCOUNTER
[facility] lab unable to add on Parasitology stool exam. Will resend order if diarrhea not improving.

## 2023-03-03 ENCOUNTER — ANTICOAGULATION THERAPY VISIT (OUTPATIENT)
Dept: ANTICOAGULATION | Facility: CLINIC | Age: 58
End: 2023-03-03
Payer: COMMERCIAL

## 2023-03-03 DIAGNOSIS — Z79.899 ENCOUNTER FOR LONG-TERM (CURRENT) USE OF HIGH-RISK MEDICATION: ICD-10-CM

## 2023-03-03 DIAGNOSIS — I48.91 ATRIAL FIBRILLATION, UNSPECIFIED TYPE (H): Primary | ICD-10-CM

## 2023-03-03 LAB — INR (EXTERNAL): 2.3 (ref 0.9–1.1)

## 2023-03-03 NOTE — PROGRESS NOTES
ANTICOAGULATION MANAGEMENT     Edson Thornton 57 year old male is on warfarin with therapeutic INR result. (Goal INR 2.0-3.0)    Recent labs: (last 7 days)     03/03/23  0000   INR 2.3*       ASSESSMENT       Source(s): Patient/Caregiver Call       Warfarin doses taken: Warfarin taken as instructed    Diet: No new diet changes identified    New illness, injury, or hospitalization: Yes: getting over Covid, but continues to have UR symptoms with productive cough and yellow sputum     Medication/supplement changes: Prednisone 20mg daily finished tonight, but continues with Levaquin     Signs or symptoms of bleeding or clotting: No    Previous INR: Supratherapeutic    Additional findings: None         PLAN     Recommended plan for ongoing change(s) affecting INR     Dosing Instructions: 4mg 3/6 and 4mg 3/8 then continue your current warfarin dose with next INR in 1 week       Summary  As of 3/3/2023    Full warfarin instructions:  3/6: 4 mg; 3/8: 4 mg; Otherwise 4 mg every Sun, Tue, Fri; 5 mg all other days   Next INR check:  3/10/2023             Telephone call with Bret who verbalizes understanding and agrees to plan and who agrees to plan and repeated back plan correctly  Sent "[employer] message with dosing and follow up instructions    Patient using outside facility for labs    Education provided:     None required    Plan made with [facility] Pharmacist Usha Choi, RN  Anticoagulation Clinic  3/3/2023    _______________________________________________________________________     Anticoagulation Episode Summary     Current INR goal:  2.0-3.0   TTR:  64.8 % (1 y)   Target end date:  Indefinite   Send INR reminders to:  [facility]    Indications    Atrial fibrillation  unspecified type (H) [I48.91]  Encounter for long-term (current) use of high-risk medication [Z79.899]           Comments:           Anticoagulation Care Providers     Provider Role Specialty Phone number    Abiodun Woods,  MD Referring Pulmonary Disease [phone]

## 2023-03-05 ENCOUNTER — TELEPHONE (OUTPATIENT)
Dept: TRANSPLANT | Facility: CLINIC | Age: 58
End: 2023-03-05
Payer: COMMERCIAL

## 2023-03-05 NOTE — TELEPHONE ENCOUNTER
Patient called with on ongoing symptoms related to recent Covid infection. Patient tested positive for covid on 2/16/2023. Treated with IV Remdesivir at [facility] in [place], [place]. Patient reported he had ongoing symptoms so was started on a five day coarse of Levaquin - last dose Friday 3/3/2023. Patient reported since Friday, symptoms have continued to worsen.       HA: Yes, started four days ago, minimal relief with tylenol  Pain: Eyes,   Cough: Yes, productive green/yellow sputum  Fever: Yes, 100.6, Last dose of tylenol 9PM 03/04/2023  Sore throat: No  Nausea:  Yes, decreased appetite, trying to maintain fluid intake  Vomiting: Yes,  Has been able to keep transplant meds down, vomiting x 3 this AM related to coughing spells  Sinus: Congestion  GI: Stomach pain, had diarrhea previously   SOB: Prior to Covid infection was not limited by shortness of breath during ADLs, patient noted he was able to walk up/down stairs without SOB nut now he is unable to walk to bathroom w/o saturations drop mid's 80's.     Plan: Patient instructed to go to ER for additional testing and assessment due to worsening symptoms and significant shortness of breath. Transplant coordinator to update In Patient attending transplant pulmonologist. Patient encouraged to ask local MD to reach out to in patient attending through hospital  for MD to MD conversation to determine best plan of care.     Message sent to transplant coordinator for additional follow-up.

## 2023-03-06 ENCOUNTER — HOSPITAL ENCOUNTER (INPATIENT)
Facility: CLINIC | Age: 58
LOS: 8 days | Discharge: HOME | DRG: 177 | End: 2023-03-14
Attending: STUDENT IN AN ORGANIZED HEALTH CARE EDUCATION/TRAINING PROGRAM | Admitting: STUDENT IN AN ORGANIZED HEALTH CARE EDUCATION/TRAINING PROGRAM
Payer: COMMERCIAL

## 2023-03-06 DIAGNOSIS — B44.9 ASPERGILLUS (H): ICD-10-CM

## 2023-03-06 DIAGNOSIS — R09.89 PULMONARY AIR TRAPPING: ICD-10-CM

## 2023-03-06 DIAGNOSIS — I10 PRIMARY HYPERTENSION: ICD-10-CM

## 2023-03-06 DIAGNOSIS — D84.9 IMMUNOSUPPRESSED STATUS (H): ICD-10-CM

## 2023-03-06 DIAGNOSIS — R04.89: Primary | ICD-10-CM

## 2023-03-06 DIAGNOSIS — J18.9 PNEUMONIA DUE TO INFECTIOUS ORGANISM, UNSPECIFIED LATERALITY, UNSPECIFIED PART OF LUNG: ICD-10-CM

## 2023-03-06 DIAGNOSIS — Z94.2 S/P LUNG TRANSPLANT (H): Chronic | ICD-10-CM

## 2023-03-06 DIAGNOSIS — I48.91 ATRIAL FIBRILLATION, UNSPECIFIED TYPE (H): ICD-10-CM

## 2023-03-06 DIAGNOSIS — G25.1 TREMOR DUE TO MULTIPLE DRUGS: ICD-10-CM

## 2023-03-06 PROBLEM — I26.99 PULMONARY EMBOLISM (H): Status: ACTIVE | Noted: 2023-03-06

## 2023-03-06 LAB
ALBUMIN SERPL BCG-MCNC: 3.4 G/DL (ref 3.5–5.2)
ALP SERPL-CCNC: 71 U/L (ref 40–129)
ALT SERPL W P-5'-P-CCNC: 23 U/L (ref 10–50)
ANION GAP SERPL CALCULATED.3IONS-SCNC: 12 MMOL/L (ref 7–15)
AST SERPL W P-5'-P-CCNC: 32 U/L (ref 10–50)
BASOPHILS # BLD MANUAL: 0 10E3/UL (ref 0–0.2)
BASOPHILS NFR BLD MANUAL: 0 %
BILIRUB SERPL-MCNC: 0.2 MG/DL
BUN SERPL-MCNC: 18.6 MG/DL (ref 6–20)
BURR CELLS BLD QL SMEAR: SLIGHT
CALCIUM SERPL-MCNC: 8.7 MG/DL (ref 8.6–10)
CHLORIDE SERPL-SCNC: 105 MMOL/L (ref 98–107)
CREAT SERPL-MCNC: 1.1 MG/DL (ref 0.67–1.17)
CRP SERPL-MCNC: 76.5 MG/L
D DIMER PPP FEU-MCNC: 0.27 UG/ML FEU (ref 0–0.5)
DEPRECATED HCO3 PLAS-SCNC: 21 MMOL/L (ref 22–29)
EOSINOPHIL # BLD MANUAL: 0 10E3/UL (ref 0–0.7)
EOSINOPHIL NFR BLD MANUAL: 0 %
ERYTHROCYTE [DISTWIDTH] IN BLOOD BY AUTOMATED COUNT: 15.9 % (ref 10–15)
ERYTHROCYTE [SEDIMENTATION RATE] IN BLOOD BY WESTERGREN METHOD: 22 MM/HR (ref 0–20)
GFR SERPL CREATININE-BSD FRML MDRD: 78 ML/MIN/1.73M2
GLUCOSE BLDC GLUCOMTR-MCNC: 147 MG/DL (ref 70–99)
GLUCOSE BLDC GLUCOMTR-MCNC: 157 MG/DL (ref 70–99)
GLUCOSE SERPL-MCNC: 160 MG/DL (ref 70–99)
HCT VFR BLD AUTO: 34.8 % (ref 40–53)
HGB BLD-MCNC: 10.8 G/DL (ref 13.3–17.7)
INR PPP: 2.57 (ref 0.85–1.15)
LYMPHOCYTES # BLD MANUAL: 0.3 10E3/UL (ref 0.8–5.3)
LYMPHOCYTES NFR BLD MANUAL: 6 %
MCH RBC QN AUTO: 25.1 PG (ref 26.5–33)
MCHC RBC AUTO-ENTMCNC: 31 G/DL (ref 31.5–36.5)
MCV RBC AUTO: 81 FL (ref 78–100)
MONOCYTES # BLD MANUAL: 0.5 10E3/UL (ref 0–1.3)
MONOCYTES NFR BLD MANUAL: 10 %
MYELOCYTES # BLD MANUAL: 0.1 10E3/UL
MYELOCYTES NFR BLD MANUAL: 1 %
NEUTROPHILS # BLD MANUAL: 4.2 10E3/UL (ref 1.6–8.3)
NEUTROPHILS NFR BLD MANUAL: 83 %
PLAT MORPH BLD: ABNORMAL
PLATELET # BLD AUTO: 123 10E3/UL (ref 150–450)
POTASSIUM SERPL-SCNC: 4.9 MMOL/L (ref 3.4–5.3)
PROCALCITONIN SERPL IA-MCNC: 0.07 NG/ML
PROT SERPL-MCNC: 5.4 G/DL (ref 6.4–8.3)
RBC # BLD AUTO: 4.31 10E6/UL (ref 4.4–5.9)
RBC MORPH BLD: ABNORMAL
SODIUM SERPL-SCNC: 138 MMOL/L (ref 136–145)
WBC # BLD AUTO: 5 10E3/UL (ref 4–11)

## 2023-03-06 PROCEDURE — 99223 1ST HOSP IP/OBS HIGH 75: CPT | Mod: AI | Performed by: STUDENT IN AN ORGANIZED HEALTH CARE EDUCATION/TRAINING PROGRAM

## 2023-03-06 PROCEDURE — 999N000128 HC STATISTIC PERIPHERAL IV START W/O US GUIDANCE

## 2023-03-06 PROCEDURE — 999N000157 HC STATISTIC RCP TIME EA 10 MIN

## 2023-03-06 PROCEDURE — 214N000001 HC R&B CCU UMMC

## 2023-03-06 PROCEDURE — 85379 FIBRIN DEGRADATION QUANT: CPT | Performed by: STUDENT IN AN ORGANIZED HEALTH CARE EDUCATION/TRAINING PROGRAM

## 2023-03-06 PROCEDURE — 250N000011 HC RX IP 250 OP 636: Performed by: STUDENT IN AN ORGANIZED HEALTH CARE EDUCATION/TRAINING PROGRAM

## 2023-03-06 PROCEDURE — 85027 COMPLETE CBC AUTOMATED: CPT | Performed by: STUDENT IN AN ORGANIZED HEALTH CARE EDUCATION/TRAINING PROGRAM

## 2023-03-06 PROCEDURE — 85652 RBC SED RATE AUTOMATED: CPT | Performed by: STUDENT IN AN ORGANIZED HEALTH CARE EDUCATION/TRAINING PROGRAM

## 2023-03-06 PROCEDURE — 85610 PROTHROMBIN TIME: CPT | Performed by: STUDENT IN AN ORGANIZED HEALTH CARE EDUCATION/TRAINING PROGRAM

## 2023-03-06 PROCEDURE — 258N000003 HC RX IP 258 OP 636: Performed by: STUDENT IN AN ORGANIZED HEALTH CARE EDUCATION/TRAINING PROGRAM

## 2023-03-06 PROCEDURE — 85007 BL SMEAR W/DIFF WBC COUNT: CPT | Performed by: STUDENT IN AN ORGANIZED HEALTH CARE EDUCATION/TRAINING PROGRAM

## 2023-03-06 PROCEDURE — 86140 C-REACTIVE PROTEIN: CPT | Performed by: STUDENT IN AN ORGANIZED HEALTH CARE EDUCATION/TRAINING PROGRAM

## 2023-03-06 PROCEDURE — 84145 PROCALCITONIN (PCT): CPT | Performed by: STUDENT IN AN ORGANIZED HEALTH CARE EDUCATION/TRAINING PROGRAM

## 2023-03-06 PROCEDURE — 250N000013 HC RX MED GY IP 250 OP 250 PS 637: Performed by: STUDENT IN AN ORGANIZED HEALTH CARE EDUCATION/TRAINING PROGRAM

## 2023-03-06 PROCEDURE — 36415 COLL VENOUS BLD VENIPUNCTURE: CPT | Performed by: STUDENT IN AN ORGANIZED HEALTH CARE EDUCATION/TRAINING PROGRAM

## 2023-03-06 PROCEDURE — 94660 CPAP INITIATION&MGMT: CPT

## 2023-03-06 PROCEDURE — 250N000012 HC RX MED GY IP 250 OP 636 PS 637: Performed by: STUDENT IN AN ORGANIZED HEALTH CARE EDUCATION/TRAINING PROGRAM

## 2023-03-06 PROCEDURE — XW033E5 INTRODUCTION OF REMDESIVIR ANTI-INFECTIVE INTO PERIPHERAL VEIN, PERCUTANEOUS APPROACH, NEW TECHNOLOGY GROUP 5: ICD-10-PCS | Performed by: STUDENT IN AN ORGANIZED HEALTH CARE EDUCATION/TRAINING PROGRAM

## 2023-03-06 PROCEDURE — 80053 COMPREHEN METABOLIC PANEL: CPT | Performed by: STUDENT IN AN ORGANIZED HEALTH CARE EDUCATION/TRAINING PROGRAM

## 2023-03-06 RX ORDER — HEPARIN SODIUM 10000 [USP'U]/100ML
0-5000 INJECTION, SOLUTION INTRAVENOUS CONTINUOUS
Status: DISCONTINUED | OUTPATIENT
Start: 2023-03-06 | End: 2023-03-08

## 2023-03-06 RX ORDER — LIDOCAINE 40 MG/G
CREAM TOPICAL
Status: DISCONTINUED | OUTPATIENT
Start: 2023-03-06 | End: 2023-03-14 | Stop reason: HOSPADM

## 2023-03-06 RX ORDER — DEXTROSE MONOHYDRATE 25 G/50ML
25-50 INJECTION, SOLUTION INTRAVENOUS
Status: DISCONTINUED | OUTPATIENT
Start: 2023-03-06 | End: 2023-03-14 | Stop reason: HOSPADM

## 2023-03-06 RX ORDER — MIRTAZAPINE 7.5 MG/1
7.5 TABLET, FILM COATED ORAL AT BEDTIME
Status: DISCONTINUED | OUTPATIENT
Start: 2023-03-06 | End: 2023-03-14 | Stop reason: HOSPADM

## 2023-03-06 RX ORDER — PREDNISONE 5 MG/1
5 TABLET ORAL EVERY MORNING
Status: DISCONTINUED | OUTPATIENT
Start: 2023-03-07 | End: 2023-03-14 | Stop reason: HOSPADM

## 2023-03-06 RX ORDER — TAMSULOSIN HYDROCHLORIDE 0.4 MG/1
0.4 CAPSULE ORAL EVERY EVENING
Status: DISCONTINUED | OUTPATIENT
Start: 2023-03-06 | End: 2023-03-14 | Stop reason: HOSPADM

## 2023-03-06 RX ORDER — PANTOPRAZOLE SODIUM 40 MG/1
40 TABLET, DELAYED RELEASE ORAL
Status: DISCONTINUED | OUTPATIENT
Start: 2023-03-07 | End: 2023-03-14 | Stop reason: HOSPADM

## 2023-03-06 RX ORDER — NICOTINE POLACRILEX 4 MG
15-30 LOZENGE BUCCAL
Status: DISCONTINUED | OUTPATIENT
Start: 2023-03-06 | End: 2023-03-14 | Stop reason: HOSPADM

## 2023-03-06 RX ORDER — VANCOMYCIN HYDROCHLORIDE 1 G/200ML
1000 INJECTION, SOLUTION INTRAVENOUS
Status: DISCONTINUED | OUTPATIENT
Start: 2023-03-07 | End: 2023-03-06

## 2023-03-06 RX ORDER — LAMOTRIGINE 25 MG/1
50 TABLET ORAL EVERY EVENING
Status: DISCONTINUED | OUTPATIENT
Start: 2023-03-06 | End: 2023-03-14 | Stop reason: HOSPADM

## 2023-03-06 RX ORDER — LEVOTHYROXINE SODIUM 25 UG/1
25 TABLET ORAL
Status: DISCONTINUED | OUTPATIENT
Start: 2023-03-07 | End: 2023-03-14 | Stop reason: HOSPADM

## 2023-03-06 RX ORDER — DEXAMETHASONE SODIUM PHOSPHATE 10 MG/ML
6 INJECTION, SOLUTION INTRAMUSCULAR; INTRAVENOUS DAILY
Status: DISCONTINUED | OUTPATIENT
Start: 2023-03-07 | End: 2023-03-07

## 2023-03-06 RX ORDER — TACROLIMUS 1 MG/1
2 CAPSULE ORAL
Status: DISCONTINUED | OUTPATIENT
Start: 2023-03-07 | End: 2023-03-09

## 2023-03-06 RX ORDER — MIRTAZAPINE 7.5 MG/1
7.5 TABLET, FILM COATED ORAL AT BEDTIME
COMMUNITY

## 2023-03-06 RX ORDER — PREDNISONE 2.5 MG/1
2.5 TABLET ORAL
Status: DISCONTINUED | OUTPATIENT
Start: 2023-03-06 | End: 2023-03-14 | Stop reason: HOSPADM

## 2023-03-06 RX ORDER — LIDOCAINE 40 MG/G
CREAM TOPICAL
Status: DISCONTINUED | OUTPATIENT
Start: 2023-03-06 | End: 2023-03-06

## 2023-03-06 RX ORDER — SULFAMETHOXAZOLE AND TRIMETHOPRIM 400; 80 MG/1; MG/1
1 TABLET ORAL DAILY
Status: DISCONTINUED | OUTPATIENT
Start: 2023-03-07 | End: 2023-03-14 | Stop reason: HOSPADM

## 2023-03-06 RX ADMIN — SODIUM CHLORIDE 50 ML: 9 INJECTION, SOLUTION INTRAVENOUS at 21:06

## 2023-03-06 RX ADMIN — REMDESIVIR 100 MG: 100 INJECTION, POWDER, LYOPHILIZED, FOR SOLUTION INTRAVENOUS at 21:05

## 2023-03-06 RX ADMIN — TAMSULOSIN HYDROCHLORIDE 0.4 MG: 0.4 CAPSULE ORAL at 19:56

## 2023-03-06 RX ADMIN — MIRTAZAPINE 7.5 MG: 7.5 TABLET, FILM COATED ORAL at 21:13

## 2023-03-06 RX ADMIN — LAMOTRIGINE 50 MG: 25 TABLET ORAL at 19:56

## 2023-03-06 RX ADMIN — PREDNISONE 2.5 MG: 2.5 TABLET ORAL at 19:56

## 2023-03-06 RX ADMIN — TACROLIMUS 2.5 MG: 1 CAPSULE ORAL at 19:56

## 2023-03-06 RX ADMIN — CEFEPIME HYDROCHLORIDE 2 G: 2 INJECTION, POWDER, FOR SOLUTION INTRAVENOUS at 19:55

## 2023-03-06 RX ADMIN — HEPARIN SODIUM 1000 UNITS/HR: 10000 INJECTION, SOLUTION INTRAVENOUS at 18:47

## 2023-03-06 ASSESSMENT — ACTIVITIES OF DAILY LIVING (ADL)
ADLS_ACUITY_SCORE: 24
ADLS_ACUITY_SCORE: 37
ADLS_ACUITY_SCORE: 24
ADLS_ACUITY_SCORE: 24

## 2023-03-06 NOTE — LETTER
PHYSICIAN ORDERS      DATE & TIME ISSUED: 2023 8:16 AM  PATIENT NAME: Edson Thornton Jr.   : 1965     Former[facility] MR# [if applicable]: [id number]     DIAGNOSIS:  Long Term use of medications  Z79.899, Lung Transplant  Z94.2 and Complications of Lung Transplant T86.819  Please obtain Tacrolimus level, Voriconazole level, BMP, Mg, and CBC with platelets on either 3/16/23 or 3/17/23.    Additionally, please obtain COVID PCR for ongoing viral monitoring s/p COVID infection in the setting of immunocompromised status between the dates of 3/23/23-3/25/23, prior to transplant clinic appointment.       Thank you.    Any questions please call: Alfred PRESTON (SOT Office) at [phone]    Please fax these results to [phone].      .

## 2023-03-06 NOTE — PROGRESS NOTES
[facility]  Transfer Triage Note    Date of call: 03/05/23  Time of call: 7:16 PM    Current Patient Location: [place] med/surg non tele   Current Level of Care: Med Surg    Vitals: 103/74, pulse-65, temp-98.3, rr-28, pulse ox 98 on 2 litres   Diagnosis: Acute hypoxemic respiratory failure 2/2 covid  and PE   Is COVID-19 a concern? covid test with cycle threshold 21.6  Reason for requested transfer: Patient has established care here   Isolation Needs: Special Precautions COVID-19    Outside Records: Available  Additional records may be faxed to [phone].    Transfer accepted: Yes  Stability of Patient: Patient is at risk for instability, however patient requires urgent transfer and does not meet ICU criteria   Level of Care Needed: [facility]  Telemetry Needed:  None  Expected Time of Arrival for Transfer: greater than 24 hours  Arrival Location:  [facility]    Recommendations for Management and Stabilization: Given    Additional Comments: Edson presents for Sob. He has complicated PMH of Lung transplant in 10/2021, for NSIP, rheumatoid arthritis,  A-fib, GI bleed, CVA, h/o tracheostomy, h/o PEG tube, COPD, GERD, hypertension, hyperlipidemia, hypothyroidism, sleep apnea, steroid-induced diabetes, anxiety, depression, BPH. Presented with dyspnea, covid swab showed active infection despite been tested and treated for a positive test 2 weeks ago. CTA chest was performed 3/5 that showed filing defects in the right apical pulmonary artery, which is suspicious for subtle acute pulmonary emboli. For that reasom coumadin was stopped and patient was started on heparin drip.     ID team was consulted and on there advisory pt was started on redmesavir and dexamethasone, for concerns of superimposed bacterial pna he was also started on IV vancomycin with IV cefepime.     His MMF was withheld as the patient has active covid infection.   Patient was accepted to [facility]  level of care.         Cj August MD

## 2023-03-06 NOTE — H&P
[facility]    History and Physical - Medicine Service, MARCHEYANNE TEAM 1       Date of Admission:  3/6/2023    Assessment & Plan    Edson Thornton is a 57 year old male with a past medical history of lung transplant 2/2 NSIP who was  transferred from outside hospital for COVID Pneumonia and subacute pulmonary emboli on 3/6/2023 for pulmonary transplant consultation.     #Acute hypoxic respiratory failure   #COVID Pneumonia   Patient presented with shortness of breathing and fevers and found to be COVID positive on 3/3. Currently requiring 2L NC, needs no oxygen at baseline. He was recently treated for COVID in 2/2023 however continued to have ongoing symptoms that required retesting for COVID. Immunosuppression may be contributing to ongoing COVID infection. Hypoxia likely due to COVID and PE as mentioned below.   -will continue Remdesivir and Decadron started at outside hospital for now pending formal ID recs  -transplant ID consulted for question regarding ongoing active infection and treatment   -will continue Cefepime and Vancomycin started at outside hospital, will likely deescalate in AM  -pro calcitonin to evaluate superimposing bacterial infection   -sputum cultures     #Apical Pulmonary Embolism   CTA obtained at outside hospital (3/2023) showed 2 small filling defects in the right apical pulmonary artery,suspicious for subtle acute pulmonary emboli. Interestingly, patient was on Coumadin with therapeutic INR. He was transitioned to heparin gtt and transferred from OSH.  -continue heparin gtt   -likely will need Heme/Onc consult for anticoagulation recs given patient developed PE on coumadin     #Lung Transplant (10/2021) 2/2 NISP   -pulmonary transplant consulted   -per brief discussion with transplant, will continue PTA tacrolimus and prednisone and hold PTA MMF  -PTA Prednisone 5 in AM and 2.5 in PM  -AM tacro level pending   -PTA Tacrolimus 2 mg in AM and 5  "in PM  -PTA Bactrim for PJP ppx     ~chronic conditions~     #HTN: holding PTA norvasc   #Hypothyroidusm: PTA levo  #GERD: PTA Pantoprazole   #Insomnia: PTA Melatonin   #Steroid induced hyperglycemia: sliding scale insulin  #BPH: PTA Tamsulosin    #Mood Disorder: PTA Fluoxetine, PTA Mirtazapine, PTA Lamotrigine         Diet:  regular   DVT Prophylaxis: heparin gtt  Watson Catheter: Not present  Fluids: n/a   Lines: None     Cardiac Monitoring: None  Code Status:  full    Clinically Significant Risk Factors Present on Admission               # Drug Induced Coagulation Defect: home medication list includes an anticoagulant medication         # Overweight: Estimated body mass index is 26.13 kg/m  as calculated from the following:    Height as of this encounter: 1.626 m (5' 4\").    Weight as of this encounter: 69 kg (152 lb 3.2 oz).           Disposition Plan      The patient's care was discussed with the Attending Physician, Dr. khan.      Franchesca Rubio MD  Medicine Service, [address]  Securely message with Vocera (more info)  Text page via [employer] Paging/Directory   See signed in provider for up to date coverage information  ______________________________________________________________________    Chief Complaint    Shortness of breath, fever     History is obtained from the patient    History of Present Illness   Edson Thornton is a 57 year old male with a past medical history of NSIP s/p lung transplant (10/2021) rheumatoid arthritis,  A-fib, COPD, GERD, hypertension, HLD, and recent COVID PNA (2/16/2023) who presented to OSH for fevers and ongoing shortness of breath and was found to be COVID positive with CT showing apical pulmonary embolism. Patient transferred from OSH to [facility] for Pulmonary transplant consultation and ID consultation given concern of ongoing COVID infection while on immunosuppressive therapy for lung transplant. " "    Patient states he was treated with  IV Remdesivir at [facility] in [place], [place] after he tested positive for COVID on 2/2023. Despite treatment, he had ongoing shortness of breath and thus was then treated with levaquin. He continued to have going shortness of breath which is why he presented to the ED and tested positive.    He states he now has some shortness of breath but is otherwise feeling well. Denies nausea, diarrhea, or chest pain. Having some fevers intermittently but not very bothered.     Per outside hospital documentation prior to transfer:   \"CTA chest was performed 3/5 that showed filing defects in the right apical pulmonary artery, which is suspicious for subtle acute pulmonary emboli. For that reasom coumadin was stopped and patient was started on heparin drip.      ID team was consulted and on there advisory pt was started on redmesavir and dexamethasone, for concerns of superimposed bacterial pna he was also started on IV vancomycin with IV cefepime. His MMF was withheld as the patient has active covid infection.\"    Past Medical History    Past Medical History:   Diagnosis Date     BRENNAN (acute kidney injury) (H) 10/17/2021     Anxiety      Depression      HLD (hyperlipidemia)      HTN (hypertension)      Hypothyroidism      ILD (interstitial lung disease) (H)      SALTY on CPAP      Oxygen dependent     BL 4L since ~6/2021     Primary hypertension 2/28/2022     Rheumatoid arthritis (H)     signs ~5/2020, dx 5/2021     S/P lung transplant (H) 10/16/2021     Shock liver 10/17/2021     Steroid-induced hyperglycemia      Traction bronchiectasis (H)        Past Surgical History   Past Surgical History:   Procedure Laterality Date     BRONCHOSCOPY (RIGID OR FLEXIBLE), DIAGNOSTIC N/A 1/26/2022    Procedure: BRONCHOSCOPY, WITH BRONCHOALVEOLAR LAVAGE AND BIOPSY;  Surgeon: Perlman, David Morris, MD;  Location: [facility]     BRONCHOSCOPY (RIGID OR FLEXIBLE), DIAGNOSTIC N/A 4/19/2022    Procedure: " BRONCHOSCOPY, WITH BRONCHOALVEOLAR LAVAGE AND BIOPSY;  Surgeon: Sherine Merrill MD;  Location:  GI     BRONCHOSCOPY (RIGID OR FLEXIBLE), DIAGNOSTIC N/A 6/21/2022    Procedure: BRONCHOSCOPY, WITH BRONCHOALVEOLAR LAVAGE AND BIOPSY;  Surgeon: Aneesh Rubio MD;  [facility]     COLONOSCOPY W/ BIOPSIES AND POLYPECTOMY  07/21/2020     CV CORONARY ANGIOGRAM N/A 09/08/2021    Procedure: Coronary Angiogram with possible intervention;  Surgeon: Jovon Bullock MD;  Location:  HEART CARDIAC CATH LAB     CV RIGHT HEART CATH MEASUREMENTS RECORDED N/A 09/08/2021    Procedure: Right Heart Cath;  Surgeon: Jovon Bullock MD;  Location:  HEART CARDIAC CATH LAB     ESOPHAGOSCOPY, GASTROSCOPY, DUODENOSCOPY (EGD), COMBINED N/A 10/23/2021    Procedure: ESOPHAGOGASTRODUODENOSCOPY (EGD);  Surgeon: Miquel Pisano MD;  Location:  GI     ESOPHAGOSCOPY, GASTROSCOPY, DUODENOSCOPY (EGD), COMBINED N/A 11/02/2021    Procedure: ESOPHAGOGASTRODUODENOSCOPY (EGD);  Surgeon: Daniel Ortiz MD;  Location:  GI     ESOPHAGOSCOPY, GASTROSCOPY, DUODENOSCOPY (EGD), COMBINED N/A 11/5/2021    Procedure: ESOPHAGOGASTRODUODENOSCOPY (EGD);  Surgeon: Ronnell Hernandez MD;  Location:  GI     EXTRACTION(S) DENTAL N/A 09/22/2021    Procedure: EXTRACTION tooth #19;  Surgeon: Deepak Tobin DDS;  Location: [facility] OR     HERNIA REPAIR       IR CHEST TUBE PLACEMENT NON-TUNNELLED LEFT  11/03/2021     PICC TRIPLE LUMEN PLACEMENT Left 11/04/2021    5FR TL PICC. Right non occlusive thrombus subclavian vein.     REPLACE GASTROJEJUNOSTOMY TUBE, PERCUTANEOUS N/A 11/9/2021    Procedure: REPLACEMENT, GASTROJEJUNOSTOMY TUBE, PERCUTANEOUS;  Surgeon: Hernando Rodriguez MD;  Location: [facility] GI     right acl       TRACHEOSTOMY PERCUTANEOUS N/A 10/29/2021    Procedure: Percutaneous Tracheostomy,;  Surgeon: Celine Jenkins MD;  Location: [facility]     TRANSPLANT LUNG RECIPIENT SINGLE X2 Bilateral 10/16/2021    Procedure: TRANSPLANT,  LUNG, RECIPIENT, BILATERAL, Bronchoscopy, on-pump perfusion, bilateral clamshell sternotomy;  Surgeon: Yanick Corral MD;  Location: [facility] OR     XR ACROMIOCLAVICULAR JOINT BILATERAL         Prior to Admission Medications   Prior to Admission Medications   Prescriptions Last Dose Informant Patient Reported? Taking?   DULoxetine (CYMBALTA) 30 MG capsule   Yes No   Sig: Take 3 capsules (90 mg) by mouth daily   FLUoxetine (PROZAC) 20 MG capsule 3/6/2023 at 0800 Self Yes Yes   Sig: Take 20 mg by mouth daily   MYFORTIC (BRAND) 360 MG EC tablet 3/5/2023 at 0800  No Yes   Sig: Take 2 tablets (720 mg) by mouth 2 times daily   Magnesium Glycinate POWD 3/6/2023 at 0800  Yes Yes   Si mg 3 times daily Take 3 tablets (100mg each) three times daily   Melatonin 10 MG TABS tablet Past Month  No Yes   Sig: Take 1 tablet (10 mg) by mouth nightly as needed for sleep   acetaminophen (TYLENOL) 325 MG tablet Past Week  No Yes   Sig: 3 tablets (975 mg) by Oral or Feeding Tube route every 8 hours as needed for mild pain   amLODIPine (NORVASC) 10 MG tablet 3/5/2023  No Yes   Sig: Take 1 tablet (10 mg) by mouth At Bedtime   buPROPion (WELLBUTRIN XL) 150 MG 24 hr tablet   Yes No   Sig: Take 150 mg by mouth   calcium carbonate 600 mg-vitamin D 400 units 600-400 MG-UNIT per tablet 3/5/2023  No Yes   Si tablet by Oral or Feeding Tube route daily   diclofenac (VOLTAREN) 1 % topical gel Past Month  No Yes   Sig: Apply 4 g topically 4 times daily Both hands   fluticasone (FLONASE) 50 MCG/ACT nasal spray 3/6/2023 at 0800  No Yes   Sig: Spray 1 spray into both nostrils daily   fluticasone-salmeterol (ADVAIR) 500-50 MCG/ACT inhaler 3/6/2023 at 0800  No Yes   Sig: Inhale 1 puff into the lungs every 12 hours   insulin aspart (NOVOLOG PEN) 100 UNIT/ML pen 3/5/2023  No Yes   Sig: Take 1-3 units TID if BS over 200. Max daily dose is 12   insulin glargine (LANTUS PEN) 100 UNIT/ML pen   No No   Sig: Inject 3 Units Subcutaneous every morning    insulin pen needle (32G X 4 MM) 32G X 4 MM miscellaneous   No No   Sig: Use 4 pen needles daily or as directed.   levalbuterol (XOPENEX HFA) 45 MCG/ACT inhaler   No No   Sig: Inhale 2 puffs into the lungs every 4 hours as needed for wheezing or shortness of breath / dyspnea   Patient not taking: Reported on 6/20/2022   levofloxacin (LEVAQUIN) 750 MG tablet 3/6/2023 at 0800  No Yes   Sig: TAKE 1 TABLET BY MOUTH ONCE DAILY FOR 14 DAYS   levothyroxine (SYNTHROID/LEVOTHROID) 25 MCG tablet 3/6/2023 at 0730  No Yes   Sig: Take 1 tablet (25 mcg) by mouth daily   mirtazapine (REMERON) 7.5 MG tablet 3/5/2023 Self Yes Yes   Sig: Take 7.5 mg by mouth At Bedtime   multivitamin w/minerals (THERA-VIT-M) tablet 3/6/2023 at 0800  No Yes   Sig: Take 1 tablet by mouth daily   ondansetron (ZOFRAN-ODT) 4 MG ODT tab 3/5/2023  No Yes   Sig: Take 1 tablet (4 mg) by mouth every 6 hours as needed for nausea   pantoprazole (PROTONIX) 40 MG EC tablet 3/5/2023  No Yes   Sig: Take 1 tablet (40 mg) by mouth 2 times daily (before meals)   predniSONE (DELTASONE) 2.5 MG tablet 3/5/2023  No Yes   Sig: Take 1 tablet (2.5 mg) by mouth every evening Total dose: 5mg in AM and 2.5 mg in PM   predniSONE (DELTASONE) 5 MG tablet 3/5/2023  No Yes   Sig: Take 1 tablet (5 mg) by mouth every morning AND 0.5 tablets (2.5 mg) every evening.   propranolol (INDERAL) 20 MG tablet 3/5/2023  No Yes   Sig: Take 2 tablets (40 mg) by mouth 2 times daily   rOPINIRole (REQUIP) 0.25 MG tablet Past Week  Yes Yes   Sig: Take 0.25 mg by mouth At Bedtime   rosuvastatin (CRESTOR) 10 MG tablet 3/5/2023  No Yes   Sig: Take 1 tablet (10 mg) by mouth daily   senna-docusate (SENOKOT-S/PERICOLACE) 8.6-50 MG tablet More than a month  Yes Yes   Sig: Take 2 tablets by mouth 2 times daily as needed for constipation   sulfamethoxazole-trimethoprim (BACTRIM) 400-80 MG tablet 3/6/2023 at 0800  No Yes   Sig: Take 1 tablet by mouth daily   tacrolimus (GENERIC EQUIVALENT) 0.5 MG capsule  3/5/2023  No Yes   Sig: Take 1 capsule (0.5 mg) by mouth daily Total dose: 2mg in AM and 2.5mg in PM   tacrolimus (GENERIC EQUIVALENT) 1 MG capsule 3/6/2023 at 0800  No Yes   Sig: Take 2 capsules (2 mg) by mouth 2 times daily Total dose: 2mg in AM and 2.5mg in PM   tamsulosin (FLOMAX) 0.4 MG capsule Past Week  Yes Yes   Sig: Take 1 capsule (0.4 mg) by mouth daily   warfarin ANTICOAGULANT (COUMADIN) 1 MG tablet 3/5/2023  No Yes   Sig: Take 1 tablet daily (along with 2 mg tablet) as directed by Anticoagulation Clinic.   warfarin ANTICOAGULANT (COUMADIN) 2 MG tablet 3/5/2023  No Yes   Sig: Take 1 to 2 tablets daily as directed by Anticoagulation Clinic.   zolpidem (AMBIEN) 10 MG tablet   Yes No   Si/2 to 1 tablet as needed      Facility-Administered Medications: None      Physical Exam   Vital Signs: Temp: 98.4  F (36.9  C) Temp src: Oral BP: 139/82 Pulse: 58   Resp: 20 SpO2: 92 % O2 Device: None (Room air)    Weight: 152 lbs 3.2 oz  General: no acute distress, sitting comfortably in bed on 2L NC, conversant   HEENT: NC, AT, EOMI  Cardiac: RRR, normal S1/S2, warm distal extremities   Lung: course crackles diffuse, predominantly in lower lobes, mild wheezing, on 2L NC   Abdomen:soft, nontender   Extremities: moving all extremities, no LE edema  Neuro: AAAX3, cranial nerves grossly intact     Medical Decision Making         Data   Labs and Imaging: reviewed by me .

## 2023-03-06 NOTE — LETTER
March 14, 2023      Edson Thornton Jr.  [address]  [place] [facility] [address]        To Whom It May Concern:    Edson Thornton Jr.  was admitted to the hospital 3/6/23-3/14/23.   Please excuse his son, Jasmeet Velazco, from work on 3/14/23 due to family illness.        Sincerely,        Nadja Hull MD MPH

## 2023-03-06 NOTE — PROGRESS NOTES
RN to RN report received from Zay Pizarro [place] pulmonary RN, phone [phone].  All questions answered at this time.  Patient will go to room 6-403.

## 2023-03-06 NOTE — PROGRESS NOTES
Admission   Diagnosis: hypoxia  Admitted from: [facility]   Via: stretcher   Accompanied by: self   Belongings: Placed in closet; valuables sent home with family, 4 loperamide held by pharmacy   Admission paperwork: complete   Teaching: Call don't fall, use of console, meal times, visiting hours, orientation to unit, when to call for the RN (angina/sob/dizzyness, etc.), and the importance of safety.   Access: PIV   Telemetry:No orders for telemetry at this time   Ht./Wt.: complete     Two nurse head-to-toe skin assessment performed by Terri Ríos and Asia Gonsalez.  Skin issues noted: diffuse bruising on bilateral legs, scarring from transplant surgery on chest and abdomen.  See PCS for assessment and treatment of wounds and surgical sites.

## 2023-03-07 LAB
ANION GAP SERPL CALCULATED.3IONS-SCNC: 11 MMOL/L (ref 7–15)
BACTERIA SPT CULT: NORMAL
BASOPHILS # BLD MANUAL: 0 10E3/UL (ref 0–0.2)
BASOPHILS NFR BLD MANUAL: 0 %
BUN SERPL-MCNC: 20.1 MG/DL (ref 6–20)
BURR CELLS BLD QL SMEAR: ABNORMAL
CALCIUM SERPL-MCNC: 8.3 MG/DL (ref 8.6–10)
CHLORIDE SERPL-SCNC: 109 MMOL/L (ref 98–107)
CMV IGG SERPL IA-ACNC: <0.2 U/ML
CMV IGG SERPL IA-ACNC: NORMAL
CREAT SERPL-MCNC: 1.1 MG/DL (ref 0.67–1.17)
CRP SERPL-MCNC: 47.6 MG/L
D DIMER PPP FEU-MCNC: 0.27 UG/ML FEU (ref 0–0.5)
DEPRECATED HCO3 PLAS-SCNC: 20 MMOL/L (ref 22–29)
EOSINOPHIL # BLD MANUAL: 0 10E3/UL (ref 0–0.7)
EOSINOPHIL NFR BLD MANUAL: 0 %
ERYTHROCYTE [DISTWIDTH] IN BLOOD BY AUTOMATED COUNT: 15.9 % (ref 10–15)
GFR SERPL CREATININE-BSD FRML MDRD: 78 ML/MIN/1.73M2
GLUCOSE BLDC GLUCOMTR-MCNC: 134 MG/DL (ref 70–99)
GLUCOSE BLDC GLUCOMTR-MCNC: 201 MG/DL (ref 70–99)
GLUCOSE BLDC GLUCOMTR-MCNC: 250 MG/DL (ref 70–99)
GLUCOSE BLDC GLUCOMTR-MCNC: 268 MG/DL (ref 70–99)
GLUCOSE SERPL-MCNC: 170 MG/DL (ref 70–99)
GRAM STAIN RESULT: NORMAL
HCT VFR BLD AUTO: 35.4 % (ref 40–53)
HGB BLD-MCNC: 10.7 G/DL (ref 13.3–17.7)
LYMPHOCYTES # BLD MANUAL: 0.7 10E3/UL (ref 0.8–5.3)
LYMPHOCYTES NFR BLD MANUAL: 10 %
MCH RBC QN AUTO: 24.9 PG (ref 26.5–33)
MCHC RBC AUTO-ENTMCNC: 30.2 G/DL (ref 31.5–36.5)
MCV RBC AUTO: 83 FL (ref 78–100)
MONOCYTES # BLD MANUAL: 0.5 10E3/UL (ref 0–1.3)
MONOCYTES NFR BLD MANUAL: 7 %
NEUTROPHILS # BLD MANUAL: 5.7 10E3/UL (ref 1.6–8.3)
NEUTROPHILS NFR BLD MANUAL: 83 %
PLAT MORPH BLD: ABNORMAL
PLATELET # BLD AUTO: 154 10E3/UL (ref 150–450)
POTASSIUM SERPL-SCNC: 4.7 MMOL/L (ref 3.4–5.3)
RBC # BLD AUTO: 4.29 10E6/UL (ref 4.4–5.9)
RBC MORPH BLD: ABNORMAL
SODIUM SERPL-SCNC: 140 MMOL/L (ref 136–145)
TACROLIMUS BLD-MCNC: 10 UG/L (ref 5–15)
TME LAST DOSE: NORMAL H
TME LAST DOSE: NORMAL H
UFH PPP CHRO-ACNC: 0.49 IU/ML
UFH PPP CHRO-ACNC: 0.54 IU/ML
VANCOMYCIN SERPL-MCNC: 21.3 UG/ML
WBC # BLD AUTO: 6.9 10E3/UL (ref 4–11)

## 2023-03-07 PROCEDURE — 214N000001 HC R&B CCU UMMC

## 2023-03-07 PROCEDURE — 99223 1ST HOSP IP/OBS HIGH 75: CPT | Mod: FS | Performed by: PHYSICIAN ASSISTANT

## 2023-03-07 PROCEDURE — 999N000128 HC STATISTIC PERIPHERAL IV START W/O US GUIDANCE

## 2023-03-07 PROCEDURE — 258N000003 HC RX IP 258 OP 636: Performed by: STUDENT IN AN ORGANIZED HEALTH CARE EDUCATION/TRAINING PROGRAM

## 2023-03-07 PROCEDURE — 94640 AIRWAY INHALATION TREATMENT: CPT | Mod: 76

## 2023-03-07 PROCEDURE — 85379 FIBRIN DEGRADATION QUANT: CPT | Performed by: STUDENT IN AN ORGANIZED HEALTH CARE EDUCATION/TRAINING PROGRAM

## 2023-03-07 PROCEDURE — 86140 C-REACTIVE PROTEIN: CPT | Performed by: STUDENT IN AN ORGANIZED HEALTH CARE EDUCATION/TRAINING PROGRAM

## 2023-03-07 PROCEDURE — 85027 COMPLETE CBC AUTOMATED: CPT | Performed by: STUDENT IN AN ORGANIZED HEALTH CARE EDUCATION/TRAINING PROGRAM

## 2023-03-07 PROCEDURE — 82784 ASSAY IGA/IGD/IGG/IGM EACH: CPT | Performed by: PHYSICIAN ASSISTANT

## 2023-03-07 PROCEDURE — 99233 SBSQ HOSP IP/OBS HIGH 50: CPT | Mod: GC | Performed by: STUDENT IN AN ORGANIZED HEALTH CARE EDUCATION/TRAINING PROGRAM

## 2023-03-07 PROCEDURE — 250N000011 HC RX IP 250 OP 636: Performed by: STUDENT IN AN ORGANIZED HEALTH CARE EDUCATION/TRAINING PROGRAM

## 2023-03-07 PROCEDURE — 36415 COLL VENOUS BLD VENIPUNCTURE: CPT | Performed by: PHYSICIAN ASSISTANT

## 2023-03-07 PROCEDURE — 82310 ASSAY OF CALCIUM: CPT | Performed by: STUDENT IN AN ORGANIZED HEALTH CARE EDUCATION/TRAINING PROGRAM

## 2023-03-07 PROCEDURE — 85007 BL SMEAR W/DIFF WBC COUNT: CPT | Performed by: STUDENT IN AN ORGANIZED HEALTH CARE EDUCATION/TRAINING PROGRAM

## 2023-03-07 PROCEDURE — 87799 DETECT AGENT NOS DNA QUANT: CPT | Performed by: PHYSICIAN ASSISTANT

## 2023-03-07 PROCEDURE — 85520 HEPARIN ASSAY: CPT | Performed by: STUDENT IN AN ORGANIZED HEALTH CARE EDUCATION/TRAINING PROGRAM

## 2023-03-07 PROCEDURE — 87205 SMEAR GRAM STAIN: CPT | Performed by: STUDENT IN AN ORGANIZED HEALTH CARE EDUCATION/TRAINING PROGRAM

## 2023-03-07 PROCEDURE — 87102 FUNGUS ISOLATION CULTURE: CPT | Performed by: PHYSICIAN ASSISTANT

## 2023-03-07 PROCEDURE — 86644 CMV ANTIBODY: CPT | Performed by: PHYSICIAN ASSISTANT

## 2023-03-07 PROCEDURE — 80197 ASSAY OF TACROLIMUS: CPT | Performed by: STUDENT IN AN ORGANIZED HEALTH CARE EDUCATION/TRAINING PROGRAM

## 2023-03-07 PROCEDURE — 36415 COLL VENOUS BLD VENIPUNCTURE: CPT | Performed by: STUDENT IN AN ORGANIZED HEALTH CARE EDUCATION/TRAINING PROGRAM

## 2023-03-07 PROCEDURE — 250N000012 HC RX MED GY IP 250 OP 636 PS 637: Performed by: STUDENT IN AN ORGANIZED HEALTH CARE EDUCATION/TRAINING PROGRAM

## 2023-03-07 PROCEDURE — 80202 ASSAY OF VANCOMYCIN: CPT | Performed by: STUDENT IN AN ORGANIZED HEALTH CARE EDUCATION/TRAINING PROGRAM

## 2023-03-07 PROCEDURE — 99223 1ST HOSP IP/OBS HIGH 75: CPT | Mod: GC | Performed by: INTERNAL MEDICINE

## 2023-03-07 PROCEDURE — 999N000157 HC STATISTIC RCP TIME EA 10 MIN

## 2023-03-07 PROCEDURE — 99222 1ST HOSP IP/OBS MODERATE 55: CPT | Mod: GC | Performed by: INTERNAL MEDICINE

## 2023-03-07 PROCEDURE — 250N000009 HC RX 250: Performed by: PHYSICIAN ASSISTANT

## 2023-03-07 PROCEDURE — 94660 CPAP INITIATION&MGMT: CPT

## 2023-03-07 PROCEDURE — 99207 PR NO BILLABLE SERVICE THIS VISIT: CPT | Performed by: PHYSICIAN ASSISTANT

## 2023-03-07 PROCEDURE — 250N000013 HC RX MED GY IP 250 OP 250 PS 637: Performed by: STUDENT IN AN ORGANIZED HEALTH CARE EDUCATION/TRAINING PROGRAM

## 2023-03-07 RX ORDER — LEVALBUTEROL INHALATION SOLUTION 1.25 MG/3ML
1.25 SOLUTION RESPIRATORY (INHALATION) 3 TIMES DAILY
Status: DISCONTINUED | OUTPATIENT
Start: 2023-03-07 | End: 2023-03-13

## 2023-03-07 RX ORDER — VANCOMYCIN HYDROCHLORIDE 1 G/200ML
1000 INJECTION, SOLUTION INTRAVENOUS
Status: DISCONTINUED | OUTPATIENT
Start: 2023-03-07 | End: 2023-03-07

## 2023-03-07 RX ADMIN — PREDNISONE 2.5 MG: 2.5 TABLET ORAL at 17:56

## 2023-03-07 RX ADMIN — REMDESIVIR 100 MG: 100 INJECTION, POWDER, LYOPHILIZED, FOR SOLUTION INTRAVENOUS at 20:15

## 2023-03-07 RX ADMIN — PANTOPRAZOLE SODIUM 40 MG: 40 TABLET, DELAYED RELEASE ORAL at 08:30

## 2023-03-07 RX ADMIN — SODIUM CHLORIDE 50 ML: 9 INJECTION, SOLUTION INTRAVENOUS at 22:09

## 2023-03-07 RX ADMIN — LEVALBUTEROL HYDROCHLORIDE 1.25 MG: 1.25 SOLUTION RESPIRATORY (INHALATION) at 20:28

## 2023-03-07 RX ADMIN — VANCOMYCIN HYDROCHLORIDE 1250 MG: 10 INJECTION, POWDER, LYOPHILIZED, FOR SOLUTION INTRAVENOUS at 01:00

## 2023-03-07 RX ADMIN — DEXAMETHASONE SODIUM PHOSPHATE 6 MG: 10 INJECTION INTRAMUSCULAR; INTRAVENOUS at 12:26

## 2023-03-07 RX ADMIN — HEPARIN SODIUM 1000 UNITS/HR: 10000 INJECTION, SOLUTION INTRAVENOUS at 17:16

## 2023-03-07 RX ADMIN — FLUOXETINE 20 MG: 20 CAPSULE ORAL at 08:30

## 2023-03-07 RX ADMIN — LAMOTRIGINE 50 MG: 25 TABLET ORAL at 20:15

## 2023-03-07 RX ADMIN — SULFAMETHOXAZOLE AND TRIMETHOPRIM 1 TABLET: 400; 80 TABLET ORAL at 08:30

## 2023-03-07 RX ADMIN — TAMSULOSIN HYDROCHLORIDE 0.4 MG: 0.4 CAPSULE ORAL at 20:15

## 2023-03-07 RX ADMIN — TACROLIMUS 2 MG: 1 CAPSULE ORAL at 08:31

## 2023-03-07 RX ADMIN — TACROLIMUS 2.5 MG: 1 CAPSULE ORAL at 17:56

## 2023-03-07 RX ADMIN — CEFEPIME HYDROCHLORIDE 2 G: 2 INJECTION, POWDER, FOR SOLUTION INTRAVENOUS at 08:31

## 2023-03-07 RX ADMIN — MIRTAZAPINE 7.5 MG: 7.5 TABLET, FILM COATED ORAL at 21:12

## 2023-03-07 RX ADMIN — PREDNISONE 5 MG: 5 TABLET ORAL at 08:30

## 2023-03-07 RX ADMIN — LEVOTHYROXINE SODIUM 25 MCG: 0.03 TABLET ORAL at 08:30

## 2023-03-07 ASSESSMENT — ACTIVITIES OF DAILY LIVING (ADL)
ADLS_ACUITY_SCORE: 27
ADLS_ACUITY_SCORE: 24
ADLS_ACUITY_SCORE: 27
ADLS_ACUITY_SCORE: 27
ADLS_ACUITY_SCORE: 24
ADLS_ACUITY_SCORE: 24
ADLS_ACUITY_SCORE: 27
ADLS_ACUITY_SCORE: 27
ADLS_ACUITY_SCORE: 24
ADLS_ACUITY_SCORE: 24
ADLS_ACUITY_SCORE: 27
ADLS_ACUITY_SCORE: 24

## 2023-03-07 NOTE — PHARMACY-VANCOMYCIN DOSING SERVICE
Pharmacy Vancomycin Note  Date of Service 2023  Patient's  1965   57 year old, male    Indication: Abscess and Community Acquired Pneumonia  Day of Therapy: started 3/5/23 at OSH  Current vancomycin regimen:  1250 mg IV q18h  Current vancomycin monitoring method: AUC  Current vancomycin therapeutic monitoring goal: 400-600 mg*h/L    Insight[employer] Prediction of Current Vancomycin Regimen  Loading dose: N/A  Regimen: 1250 mg IV every 18 hours.  Start time: 19:00 on 2023  Exposure target: AUC24 (range)400-600 mg/L.hr   AUC24,ss: 574 mg/L.hr  Probability of AUC24 > 400: 98 %  Ctrough,ss: 17.3 mg/L  Probability of Ctrough,ss > 20: 30 %  Probability of nephrotoxicity (Lodise JACIEL ): 13 %    Current estimated CrCl = Estimated Creatinine Clearance: 73.4 mL/min (based on SCr of 1.1 mg/dL).    Creatinine for last 3 days  3/6/2023:  6:43 PM Creatinine 1.10 mg/dL  3/7/2023:  6:26 AM Creatinine 1.10 mg/dL    Recent Vancomycin Levels (past 3 days)  3/7/2023:  9:35 AM Vancomycin 21.3 ug/mL    Vancomycin IV Administrations (past 72 hours)                   vancomycin (VANCOCIN) 1,250 mg in 0.9% NaCl 250 mL intermittent infusion (mg) 1,250 mg New Bag 23 0100                Nephrotoxins and other renal medications (From now, onward)    Start     Dose/Rate Route Frequency Ordered Stop    23 1800  tacrolimus (GENERIC EQUIVALENT) capsule 2.5 mg         2.5 mg Oral EVERY EVENING. 23 18423 0800  tacrolimus (GENERIC EQUIVALENT) capsule 2 mg         2 mg Oral EVERY MORNING. 23 18423 0200  vancomycin (VANCOCIN) 1,250 mg in 0.9% NaCl 250 mL intermittent infusion         1,250 mg  over 90 Minutes Intravenous EVERY 18 HOURS 23 1951               Contrast Orders - past 72 hours (72h ago, onward)    None          Interpretation of levels and current regimen:  Vancomycin level is reflective of -600    Has serum creatinine changed greater than 50% in last 72 hours:  No    Urine output:  unable to determine    Renal Function: Stable    InsightRX Prediction of Planned New Vancomycin Regimen  Loading dose: N/A  Regimen: 1000 mg IV every 18 hours.  Start time: 19:00 on 03/07/2023  Exposure target: AUC24 (range)400-600 mg/L.hr   AUC24,ss: 466 mg/L.hr  Probability of AUC24 > 400: 78 %  Ctrough,ss: 14 mg/L  Probability of Ctrough,ss > 20: 10 %  Probability of nephrotoxicity (Lodise JACIEL 2009): 9 %    Plan:  1. Decrease Dose to 1000 mg vancomycin IV every 18 hours  2. Vancomycin monitoring method: AUC  3. Vancomycin therapeutic monitoring goal: 400-600 mg*h/L  4. Pharmacy will check vancomycin levels as appropriate in 1-3 Days.  5. Serum creatinine levels will be ordered daily for the first week of therapy and at least twice weekly for subsequent weeks.    Paulino Matias, PharmD  PGY1 Pharmacist Resident

## 2023-03-07 NOTE — PHARMACY-VANCOMYCIN DOSING SERVICE
Pharmacy Vancomycin Initial Note  Date of Service 2023  Patient's  1965  57 year old, male    Indication: Abscess, CAP    Current estimated CrCl = Estimated Creatinine Clearance: 72.3 mL/min (based on SCr of 1.1 mg/dL).    Creatinine for last 3 days  3/6/2023:  6:43 PM Creatinine 1.10 mg/dL    Recent Vancomycin Level(s) for last 3 days  No results found for requested labs within last 72 hours.      Vancomycin IV Administrations (past 72 hours)      No vancomycin orders with administrations in past 72 hours.                Nephrotoxins and other renal medications (From now, onward)    Start     Dose/Rate Route Frequency Ordered Stop    23 1800  tacrolimus (GENERIC EQUIVALENT) capsule 2.5 mg         2.5 mg Oral EVERY EVENING. 23 18423 0800  tacrolimus (GENERIC EQUIVALENT) capsule 2 mg         2 mg Oral EVERY MORNING. 23 1841      23 0200  vancomycin (VANCOCIN) 1000 mg in dextrose 5% 200 mL PREMIX         1,000 mg  200 mL/hr over 1 Hours Intravenous EVERY 18 HOURS 23 1939      23 1900  tacrolimus (GENERIC EQUIVALENT) capsule 2.5 mg         2.5 mg Oral ONCE 23 1841            Contrast Orders - past 72 hours (72h ago, onward)    None          InsightRX Prediction of Planned Initial Vancomycin Regimen  Regimen: 1250 mg IV every 18 hours.  Start time: 02:00 on 2023  Exposure target: AUC24 (range)400-600 mg/L.hr   AUC24,ss: 520 mg/L.hr  Probability of AUC24 > 400: 79 %  Ctrough,ss: 15.4 mg/L  Probability of Ctrough,ss > 20: 25 %  Probability of nephrotoxicity (Lodise JACIEL ): 11 %        Plan:  1. Start vancomycin 1250 mg IV q18h. Patient's last dose was 1000 mg on 3/6 @ ~0800. Therefore, will schedule next dose for 18 hours later at 02:00 on 3/7. Will also increase to 1250 mg due to improvement in estimated renal function.   2. Vancomycin monitoring method: AUC  3. Vancomycin therapeutic monitoring goal: 400-600 mg*h/L  4. Pharmacy will check  vancomycin levels as appropriate in 1-2 days.    5. Serum creatinine levels will be ordered daily for the first week of therapy and at least twice weekly for subsequent weeks.      Jovon Knight RPH

## 2023-03-07 NOTE — PROGRESS NOTES
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 Patient transferred for Transplant ID and Pulmonary consultation.    Today  -Heme/Onc consulted for anticoagulation recs  -awaiting transplant ID recs and Transplant pulm recs     #Acute hypoxic respiratory failure   #COVID Pneumonia   Patient presented with shortness of breathing and fevers and found to be COVID positive on 3/3. Hypoxia likely due to COVID and PE as mentioned below. Also, there may be a superimposing bacterial infection causing persistent hypoxia, however procalcitonin largely unremarkable. Currently requiring 2L NC, needs no oxygen at baseline. Of note, he was recently treated for COVID in 2/2023 however continued to have ongoing symptoms that required retesting for COVID and found to be persistently positive. Concern regarding persistent COVID infection in the setting of immunosuppressive therapy for transplant. -will continue Remdesivir and Decadron started at outside hospital for now pending formal Transplant ID recs  -transplant ID consulted for question regarding ongoing active infection and treatment   -will continue Cefepime and Vancomycin started at outside hospital, will likely deescalate in AM pending transplant ID recs.   -sputum cultures pending     #Apical Pulmonary Embolism   CTA obtained at outside hospital (3/2023) showed 2 small filling defects in the right apical pulmonary artery,suspicious for subtle acute pulmonary emboli. Interestingly, patient was on Coumadin with therapeutic INR. He was transitioned to heparin  "gtt and transferred from OSH.  -continue heparin gtt   -Heme/Onc consult for anticoagulation recs given patient developed PE on coumadin with therapeutic INR    #Lung Transplant (10/2021) 2/2 [facility]   -pulmonary transplant consulted   -will continue PTA tacrolimus and prednisone and hold PTA MMF  -PTA Prednisone 5 in AM and 2.5 in PM  -AM tacro level pending   -PTA Tacrolimus 2 mg in AM and 5 in PM  -PTA Bactrim for PJP ppx     ~chronic conditions~      #HTN: holding PTA norvasc   #Hypothyroidusm: PTA levo  #GERD: PTA Pantoprazole   #Insomnia: PTA Melatonin   #Steroid induced hyperglycemia: sliding scale insulin  #BPH: PTA Tamsulosin    #Mood Disorder: PTA Fluoxetine, PTA Mirtazapine, PTA Lamotrigine    Diet:  regular   DVT Prophylaxis: heparin gtt  Watson Catheter: Not present  Fluids: n/a   Lines: None     Cardiac Monitoring: None  Code Status:  full           Clinically Significant Risk Factors Present on Admission          # Hypocalcemia: Lowest Ca = 8.3 mg/dL in last 2 days, will monitor and replace as appropriate     # Hypoalbuminemia: Lowest albumin = 3.4 g/dL at 3/6/2023  6:43 PM, will monitor as appropriate  # Drug Induced Coagulation Defect: home medication list includes an anticoagulant medication  # Thrombocytopenia: Lowest platelets = 123 in last 2 days, will monitor for bleeding        # Overweight: Estimated body mass index is 26.5 kg/m  as calculated from the following:    Height as of this encounter: 1.626 m (5' 4\").    Weight as of this encounter: 70 kg (154 lb 6.4 oz).           Disposition Plan     Expected Discharge Date: 03/08/2023                The patient's care was discussed with the Attending Physician, Dr. Cheatham.    Franchesca Rubio MD  Medicine Service, [facility] TEAM [address] [place] [address]  Securely message with IMRSV (more info)  Text page via [employer] Paging/Directory   See signed in provider for up to date coverage " information  ______________________________________________________________________    Interval History   No acute events overnight.     Feels the same as yesterday. Shortness of breath with walking around. Denies fevers. Endorses adequate appetite.     Physical Exam   Vital Signs: Temp: 97  F (36.1  C) Temp src: Axillary BP: 130/75 Pulse: 69   Resp: 18 SpO2: 94 % O2 Device: None (Room air)    Weight: 154 lbs 6.4 oz     Weight: 152 lbs 3.2 oz  General: no acute distress, sitting comfortably in bed on 2L NC, conversant   HEENT: NC, AT, EOMI  Cardiac: RRR, normal S1/S2, warm distal extremities   Lung: course crackles diffuse, predominantly in lower lobes, mild wheezing, on 2L NC   Abdomen:soft, nontender   Extremities: moving all extremities, no LE edema  Neuro: AAAX3, cranial nerves grossly intact     Medical Decision Making         Data   Labs and Imaging reviewed by me /

## 2023-03-07 NOTE — PLAN OF CARE
D: Pt admit 3/6/23 SOB and hypoxia found to have COVID pneumonia and subacute PE. PMH lung transplant 10/2021 for NSIP, rheumatoid arthritis, afib, GIB, CVA, hx tracheostomy, Hx PEG tube, COPD, GERD, HTN, HLD, hypothy, sleep apnea, steroid induced DM, anxiety, depression, BPH    Continue COVID precautions.     I/A:   Neuro: A&Ox4. Baseline neuropathy in all extremities.   VS: VSS.  Respiratory: RA/CPAP overnight. MYERS/SOB reported.   Cardiac: no telemetry orders, HR 60's via pulse oximetry  Pain: denies  GI/: Urinating adequately via bedside urinal. No BM this shift.  Diet: regular  BG/insulin: blood sugar checks ordered ACHS, no insulin orders.   IV/Drips: L PIV infusing heparin gtt 1000 units/hr. First Xa therapeutic, second Xa ordered with AM labs. Still awaiting results. R PIV infusing TKO.   Activity: SBA  Skin/drains: Generalized bruising.     P: Plan to report changes to maroon 1.     Daya Calvo RN

## 2023-03-07 NOTE — PLAN OF CARE
Goal Outcome Evaluation:      Plan of Care Reviewed With: patient    Overall Patient Progress: no changeOverall Patient Progress: no change    Outcome Evaluation: Admitted to 6C, continues to have shortness of breath    Patient admitted for suspected pulmonary embolism.  PMH of bilateral lung transplant in 10/2021, rheumatoid arthritis, afib, GI bleed, CVA, COPD, GERD, HTN, HLD, hypothyroidism, SALTY, steroid-induced DM, anxiety, depression, and BPH.     Neuro: A&O x4, denied pain and dizziness.  Reported baseline neuropathy.  Respiratory:  Had shortness of breath, lung sounds clear to auscultation.  Cardiac: Heart murmur noted.  No edema noted.    GI: Denied nausea, bowel sounds normoactive.  Reported loose stool yesterday morning.  : Had good urine output, see I&O flowsheet.  Skin: See PCS for assessment and treatment of skin conditions.   VS: HR 50s, SBP 130s, SaO2 92% on room air.    Drips:  Heparin gtt started at 1000 units/hr, 10a level due at 0100.    Electrolytes: No replacements ordered.  Pain: None.  Tests/procedures: None performed during shift.    Mobility: Ambulated in room with standby assist, was steady on his feet.  Pychosocial: Patient reported suicidal ideation with plan but no intent that occurred a month and a half ago, had inpatient treatment at that time.  Denied any thoughts to hurt or kill himself or others at this time.  MD team notified of patient's report, no interventions at this time.    Plan:  Continue to monitor pain, VS, heart rhythm, skin integrity, fluid status, bowel status, cardiac and respiratory status.  Notify care team of changes in patient condition or other concerns.  Encourage IS use to promote lung function.

## 2023-03-07 NOTE — CONSULTS
Hematology Consult Note   Date of Service: 03/07/2023    Patient: Edson Thornton Jr.  MRN: [id number]  Admission Date: 3/6/2023  Hospital Day # 1   Primary Outpatient Hematologist: None  Reason for Consult: Question of PE on warfarin, anticoagulation recommendations     History of Present Illness:    Edson Thornton Jr. is a 57 year old man with a history of a bilateral lung transplant (10/2021) 2/2 nonspecific insterstitial PNA, afib (on warfarin), and recent COVID PNA in mid February who presented to [facility] for fevers and SOB. He was found to be still be positive for COVID on 3/3. He was started on cefepime and vancomycin, IV decadron, remdesivir - he was starting to feel better, but then started to feel worse and had a CTA (3/5) at the outside hospital which showed 2 small filling defects in the right apical pulmonary artery, suspicious for acute PE. At that time, he was therapeutic on warfarin, INR 2.6, hgb 12.3, Cr 1.38. He was switched from warfarin to a heparin gtt. He was transferred for further evaluation with transplant ID. He endorses some chest tightness, especially with deep breaths, but no chest pain. Denies N/V, abdominal pain, skin rashes, swelling, dysuria. Hematology was consulted to help assess his anticoagulation plan.     Hematologic History:  [id number]: 4 (according to anticoagulation note 6/2022)   -Right subclavian DVT 11/4/21   -On warfarin since around 11/2021 after his lung transplant. Found to have afib around then    Review of Systems: Pertinent positive and negative systems described in HPI; the remainder of the 14 systems are negative    Past Medical History:  -Lung transplant on 10/2021, CKD3, HLD, HTN, Hyphtyrodism , MDD, COPD , rheumatoid arthritis, CVA (3/2022)    Past Medical History:   Diagnosis Date     BRENNAN (acute kidney injury) (H) 10/17/2021     Anxiety      Depression      HLD (hyperlipidemia)      HTN (hypertension)      Hypothyroidism      ILD  (interstitial lung disease) (H)      SALTY on CPAP      Oxygen dependent     BL 4L since ~6/2021     Primary hypertension 2/28/2022     Rheumatoid arthritis (H)     signs ~5/2020, dx 5/2021     S/P lung transplant (H) 10/16/2021     Shock liver 10/17/2021     Steroid-induced hyperglycemia      Traction bronchiectasis (H)        Past Surgical History:  History: that includes hernia repair; colon polyp bx (N/A, 7/21/2020); Shoulder Surgery; and lung transplant (10/16/2021).    Past Surgical History:   Procedure Laterality Date     BRONCHOSCOPY (RIGID OR FLEXIBLE), DIAGNOSTIC N/A 1/26/2022    Procedure: BRONCHOSCOPY, WITH BRONCHOALVEOLAR LAVAGE AND BIOPSY;  Surgeon: Perlman, David Morris, MD;  [facility]: [facility]     BRONCHOSCOPY (RIGID OR FLEXIBLE), DIAGNOSTIC N/A 4/19/2022    Procedure: BRONCHOSCOPY, WITH BRONCHOALVEOLAR LAVAGE AND BIOPSY;  Surgeon: Sherine Merrill MD;  Location:  GI     BRONCHOSCOPY (RIGID OR FLEXIBLE), DIAGNOSTIC N/A 6/21/2022    Procedure: BRONCHOSCOPY, WITH BRONCHOALVEOLAR LAVAGE AND BIOPSY;  Surgeon: Aneesh Rubio MD;  Location:  GI     COLONOSCOPY W/ BIOPSIES AND POLYPECTOMY  07/21/2020     CV CORONARY ANGIOGRAM N/A 09/08/2021    Procedure: Coronary Angiogram with possible intervention;  Surgeon: Jovon Bullock MD;  Location:  HEART CARDIAC CATH LAB     CV RIGHT HEART CATH MEASUREMENTS RECORDED N/A 09/08/2021    Procedure: Right Heart Cath;  Surgeon: Jovon Bullock MD;  Location:  HEART CARDIAC CATH LAB     ESOPHAGOSCOPY, GASTROSCOPY, DUODENOSCOPY (EGD), COMBINED N/A 10/23/2021    Procedure: ESOPHAGOGASTRODUODENOSCOPY (EGD);  Surgeon: Miquel Pisano MD;  [facility]     ESOPHAGOSCOPY, GASTROSCOPY, DUODENOSCOPY (EGD), COMBINED N/A 11/02/2021    Procedure: ESOPHAGOGASTRODUODENOSCOPY (EGD);  Surgeon: Daniel Ortiz MD;  Location:  GI     ESOPHAGOSCOPY, GASTROSCOPY, DUODENOSCOPY (EGD), COMBINED N/A 11/5/2021    Procedure: ESOPHAGOGASTRODUODENOSCOPY  (EGD);  Surgeon: Ronnell Hernandez MD;  [facility]: [facility]     EXTRACTION(S) DENTAL N/A 09/22/2021    Procedure: EXTRACTION tooth #19;  Surgeon: Deepak Tobin DDS;  [facility]     HERNIA REPAIR       IR CHEST TUBE PLACEMENT NON-TUNNELLED LEFT  11/03/2021     PICC TRIPLE LUMEN PLACEMENT Left 11/04/2021    5FR TL PICC. Right non occlusive thrombus subclavian vein.     REPLACE GASTROJEJUNOSTOMY TUBE, PERCUTANEOUS N/A 11/9/2021    Procedure: REPLACEMENT, GASTROJEJUNOSTOMY TUBE, PERCUTANEOUS;  Surgeon: Hernando Rodriguez MD;  [facility]     right acl       TRACHEOSTOMY PERCUTANEOUS N/A 10/29/2021    Procedure: Percutaneous Tracheostomy,;  Surgeon: Celine Jenkins MD;  [facility]     TRANSPLANT LUNG RECIPIENT SINGLE X2 Bilateral 10/16/2021    Procedure: TRANSPLANT, LUNG, RECIPIENT, BILATERAL, Bronchoscopy, on-pump perfusion, bilateral clamshell sternotomy;  Surgeon: Yanick Corral MD;  [facility]     XR ACROMIOCLAVICULAR JOINT BILATERAL         Social History:  Quit smoking over 20 years ago, denies alcohol  Social History     Socioeconomic History     Marital status: Single   Tobacco Use     Smoking status: Never     Smokeless tobacco: Never   Substance and Sexual Activity     Alcohol use: Never     Drug use: Never        Family History  Family History   Problem Relation Age of Onset     Diabetes Type 1 Mother      Heart Disease Mother      Chronic Obstructive Pulmonary Disease Mother      Rheumatoid Arthritis Father      Emphysema Paternal Grandfather    Denies family history of bleeding or clotting disorders     Outpatient Medications:  No current facility-administered medications on file prior to encounter.  acetaminophen (TYLENOL) 325 MG tablet, 3 tablets (975 mg) by Oral or Feeding Tube route every 8 hours as needed for mild pain  amLODIPine (NORVASC) 10 MG tablet, Take 1 tablet (10 mg) by mouth At Bedtime  calcium carbonate 600 mg-vitamin D 400 units 600-400 MG-UNIT per tablet,  1 tablet by Oral or Feeding Tube route daily  diclofenac (VOLTAREN) 1 % topical gel, Apply 4 g topically 4 times daily Both hands  FLUoxetine (PROZAC) 20 MG capsule, Take 20 mg by mouth daily  fluticasone (FLONASE) 50 MCG/ACT nasal spray, Spray 1 spray into both nostrils daily  fluticasone-salmeterol (ADVAIR) 500-50 MCG/ACT inhaler, Inhale 1 puff into the lungs every 12 hours  insulin aspart (NOVOLOG PEN) 100 UNIT/ML pen, Take 1-3 units TID if BS over 200. Max daily dose is 12  levofloxacin (LEVAQUIN) 750 MG tablet, TAKE 1 TABLET BY MOUTH ONCE DAILY FOR 14 DAYS  levothyroxine (SYNTHROID/LEVOTHROID) 25 MCG tablet, Take 1 tablet (25 mcg) by mouth daily  Magnesium Glycinate POWD, 300 mg 3 times daily Take 3 tablets (100mg each) three times daily  Melatonin 10 MG TABS tablet, Take 1 tablet (10 mg) by mouth nightly as needed for sleep  mirtazapine (REMERON) 7.5 MG tablet, Take 7.5 mg by mouth At Bedtime  multivitamin w/minerals (THERA-VIT-M) tablet, Take 1 tablet by mouth daily  MYFORTIC (BRAND) 360 MG EC tablet, Take 2 tablets (720 mg) by mouth 2 times daily  ondansetron (ZOFRAN-ODT) 4 MG ODT tab, Take 1 tablet (4 mg) by mouth every 6 hours as needed for nausea  pantoprazole (PROTONIX) 40 MG EC tablet, Take 1 tablet (40 mg) by mouth 2 times daily (before meals)  predniSONE (DELTASONE) 2.5 MG tablet, Take 1 tablet (2.5 mg) by mouth every evening Total dose: 5mg in AM and 2.5 mg in PM  predniSONE (DELTASONE) 5 MG tablet, Take 1 tablet (5 mg) by mouth every morning AND 0.5 tablets (2.5 mg) every evening.  propranolol (INDERAL) 20 MG tablet, Take 2 tablets (40 mg) by mouth 2 times daily  rOPINIRole (REQUIP) 0.25 MG tablet, Take 0.25 mg by mouth At Bedtime  rosuvastatin (CRESTOR) 10 MG tablet, Take 1 tablet (10 mg) by mouth daily  senna-docusate (SENOKOT-S/PERICOLACE) 8.6-50 MG tablet, Take 2 tablets by mouth 2 times daily as needed for constipation  sulfamethoxazole-trimethoprim (BACTRIM) 400-80 MG tablet, Take 1 tablet by  "mouth daily  tacrolimus (GENERIC EQUIVALENT) 0.5 MG capsule, Take 1 capsule (0.5 mg) by mouth daily Total dose: 2mg in AM and 2.5mg in PM  tacrolimus (GENERIC EQUIVALENT) 1 MG capsule, Take 2 capsules (2 mg) by mouth 2 times daily Total dose: 2mg in AM and 2.5mg in PM  tamsulosin (FLOMAX) 0.4 MG capsule, Take 1 capsule (0.4 mg) by mouth daily  warfarin ANTICOAGULANT (COUMADIN) 1 MG tablet, Take 1 tablet daily (along with 2 mg tablet) as directed by Anticoagulation Clinic.  warfarin ANTICOAGULANT (COUMADIN) 2 MG tablet, Take 1 to 2 tablets daily as directed by Anticoagulation Clinic.  buPROPion (WELLBUTRIN XL) 150 MG 24 hr tablet, Take 150 mg by mouth  DULoxetine (CYMBALTA) 30 MG capsule, Take 3 capsules (90 mg) by mouth daily  insulin glargine (LANTUS PEN) 100 UNIT/ML pen, Inject 3 Units Subcutaneous every morning  insulin pen needle (32G X 4 MM) 32G X 4 MM miscellaneous, Use 4 pen needles daily or as directed.  levalbuterol (XOPENEX HFA) 45 MCG/ACT inhaler, Inhale 2 puffs into the lungs every 4 hours as needed for wheezing or shortness of breath / dyspnea (Patient not taking: Reported on 6/20/2022)  zolpidem (AMBIEN) 10 MG tablet, 1/2 to 1 tablet as needed       Physical Exam:    /75 (BP Location: Left arm)   Pulse 69   Temp 97  F (36.1  C) (Axillary)   Resp 18   Ht 1.626 m (5' 4\")   Wt 70 kg (154 lb 6.4 oz)   SpO2 94%   BMI 26.50 kg/m    General: Sitting in chair comfortably, NAD, pleasant  HEENT: NC/AT, MMM  CV: RRR, no murmurs  Resp: Decreased breath sounds with posterior bases, no wheezing, on 2L NC, normal work of breathing  GI: soft, non-tender, non-distended, normal bowel sounds   MSK: warm and well-perfused  Extremities: No pitting lower extremity edema   Skin: no rashes on limited exam, no jaundice  Neuro: Alert and interactive, moves all extremities spontaneously, answers questions appropriately   Psych: Mood and affect appropriate     Labs & Studies: I personally reviewed the following " studies:  ROUTINE LABS (Last four results):  Cr: 1.1 (3/7/23)     CBC  Recent Labs   Lab 03/07/23  0626 03/06/23  1843   WBC 6.9 5.0   RBC 4.29* 4.31*   HGB 10.7* 10.8*   HCT 35.4* 34.8*   MCV 83 81   MCH 24.9* 25.1*   MCHC 30.2* 31.0*   RDW 15.9* 15.9*    123*     INR  Recent Labs   Lab 03/06/23  1843 03/03/23  0823 03/03/23  0000   INR 2.57* 2.3* 2.3*     D-dimer: 0.27 (3/6/23)    CTA Chest: 3/5/23 (outside study)   1.  2 small filling defects are seen in the right apical pulmonary artery, which are suspicious for subtle acute pulmonary emboli.  2.  Bilateral interstitial lung disease in the setting of rheumatoid arthritis.  Overall, findings appear less apparent than on 8/30/2021.  However, given the somewhat peripheral nature of this, correlation is recommended for any signs or symptoms of superimposed viral pneumonia such as COVID-19 pneumonia based on CT findings.  3.  No aortic dissection.    #Problem List  -Shortness of breath   #Fever  #COVID 19   #Questionable acute PE   #History of bilateral lung transplant     Assessment & Plan:   Edson Thornton Jr. is a 57 year old man with a history of lung transplant (10/2021), afib (on warfarin), and recent COVID PNA who presents with SOB, fever, and persistent COVID positive test. At an outside hospital, he had a CTA chest (3/5) which showed 2 small filing defects that were concerning for a small, new apical pulmonary artery PE. He had been therapeutic on warfarin, with INR of 2.6 on that day, and was switched to a heparin gtt.     It is unclear if he has a true PE or not based on the report from his outside hospital. It would be helpful to discuss with radiology and pulmonary whether or not, these CT findings represent true pulmonary embolisms. However, because these findings are so small and not definitive on CT, we would recommend continuing him on his home warfarin, for a goal INR 2-3. He could be switched to warfarin today, since he is still therapeutic  with his INR, if he is not pending upcoming procedures during this admission. Because these filling defects are so small, we do not suspect that they are driving his respiratory symptoms or hypoxia. Interestingly, his d-dimer collected on 3/6 (a day after his CTA PE study) was normal, which points away from PE, though this was done after switching to heparin gtt.     Recommendations:   -Consider reviewing CTA chest with radiology and pulmonary to discuss if he appears to have a true PE  -If no procedures planned, would switch from heparin gtt to warfarin, goal INR 2-3     Patient was seen and plan of care was discussed with attending physician Dr. Nicole.    Thank you for this interesting consult, please don't hesitate to page if there are further questions.     Juarez Bobo MD   Hematology/Oncology Fellow PGY4  Pager: [phone]    Attending Note:  I have reviewed the patient chart, and interviewed and examined the patient.  I agree with the assessment and plan. The CTA report did not seem definitive about the diagnosis of small pulmonary emboli. Therefore I am reluctant to say that this is a warfarin failure. He can be switched back to warfarin from heparin, timing depending on whether he needs any procedures.   Bonnie Benavides MD  Hematology

## 2023-03-07 NOTE — CONSULTS
Pulmonary Medicine  Cystic Fibrosis - Lung Transplant Team  Initial Consultation  2023      Patient: Edson Thornton Jr.  MRN: [id number]  : 1965 (age 57 year old)  Transplant: 10/16/2021 (Lung), POD#507  Admission date: 3/6/2023  Primary Care Provider: System, Provider Not In    Assessment & Plan:     Edson Thornton Jr. is a 57 year old male with a history of BSLT for NSIP/ILD (10/16/2021), bronchiectasis, moderate PH, RA, SALTY, chronic HSV infection, hypogammaglobulinemia, steroid-induced diabetes, hypothyroidism, PFO, HTN, HLD, duodenal anomaly, anxiety, and depression.  Post-op course complicated by encephalopathy and diffuse weakness, acute to subacute CVA, afib with RVR, DVT, BRENNAN, GI bleed, and Candidemia/Candida empyema.  The patient was admitted from OSH on 3/6/2023 for acute hypoxic respiratory failure in setting of COVID infection and PE.  Weaned to RA during the day 3/6.    Acute hypoxic respiratory failure:  COVID infection:   PE: Initially with HA, productive cough (yellow/green sputum), dyspnea, chest tightness, and fevers (Tmax 102), tested positive for COVID .  Received remdesivir x3 days followed by 1-2 days of symptomatic improvement then with return of symptoms and exertional hypoxia (baseline RA with BiPAP overnight).  Completed levofloxacin 5 day course.  Presented to OSH 3/5 and started on IV ABX, remdesivir, and dexamethasone.  Initially on 4L NC, currently on RA during the day.  H/o Klebsiella pneumoniae, Pseudomonas fluorescens/putida, Staph epi, Candida (all ) and nonsporulating saprophytic fungus (2022) on sputum cultures.  OSH infectious work up including Strep pneumoniae, Legionella, respiratory panel, MRSA nares negative, COVID remains positive (cycle threshold 21.6 on 3/5).  Procal mildly elevated, CRP elevated, no leukocytosis.  CTA chest 3/5 (OSH read) with 2 small fillings defects seen in right apical pulmonary artery (suspicious for subtle acute PE)  and scattered nonspecific GGO t/o which have somewhat nodular appearance and peripherally located.  DDx include progression of COVID infection v other/new pneumonia (bacterial, fungal), PE (noted on CTA, had been on warfarin PTA), less likely  or rejection.  - Repeat COVID testing with cycle threshold weekly (due 3/12, not yet ordered)  - Bacterial and fungal sputum cultures (ordered)  - ABX: IV cefepime, IV vancomycin empirically for now  - Remdesivir and dexamethasone continued while awaiting transplant ID recommendations  - Nebs: Xopenex TID (ordered)  - Aerobika and IS hourly while awake (ordered)  - Supplemental oxygen as needed to maintain SpO2 >92%  - AC management (heparin drip) per primary team, agree with hematology consult     S/p bilateral sequential lung transplant (BSLT) for NSIP/ILD: Seen in pulmonary clinic 12/20/22 with intermittent dyspnea, PFTs with marked improvement and no acute changes on CXR.  EBV negative 12/20/22.   - Repeat EBV for monitoring (ordered)  - Pulmonary follow up currently scheduled 4/25    Immunosuppression:  - Tacrolimus 2 mg qAM / 2.5 mg qPM.  Goal level 8-10.  Level 3/7 likely therapeutic at 10 (10.5h level), repeat level 3/10 (ordered).  - Myfortic held 3/5 in setting of COVID (prior dose 720 mg BID)  - Prednisone 5 mg qAM / 2.5 mg qPM (continue while on dexamethasone given additional mineralocorticoid activity)    Prophylaxis:   - Bactrim for PJP ppx  - CMV D-/R-, although with recent CMV <35 on 1/27 (previously not detected) which raises the question of seroconversion, defer VGCV for CMV ppx at this time (with dexamethasone as above) and repeat CMV IgG and quant (ordered) and revisit pending results    Positive crossmatch:   Positive DSA: Had a retrospective positive crossmatch following transplantation, attributed to receiving rituximab.  DSA initially positive 11/29/21.  Most recently remains positive (DQ:5) with mfi 1533 on 12/20/22 from prior mfi 891 on 11/14/22  and 1681 on 9/12/22.  - Repeat DSA for monitoring (ordered)    Hypogammaglobulinemia: IgG adequate at 502 on 1/3.   - Repeat IgG in setting of infection (ordered)     SALTY: Continue PTA NIPPV overnight.     We appreciate the excellent care provided by the [place] [id number] team.  Recommendations communicated via in person rounding and this note.  Will continue to follow along closely, please do not hesitate to call with any questions or concerns.    Patient seen and discussed with Dr. Johns.    Keyla Rice PA-C  Inpatient PRINCESS  Pulmonary CF/Transplant     Chief Complaint:     Dyspnea    History of Present Illness:     History obtained from Pt. and chart review.    Pt. reports initially developing HA, productive cough of yellow/green sputum, dyspnea, chest tightness, and fevers (up to 102 degrees F) and testing positive for COVID 2/16.  Received 3 days of remdesivir and initially with 1-2 days of symptomatic improvement then with return of dyspnea, cough, HA, and lower grade fevers (100 degrees F).  Also with exertional hypoxia, SpO2 mid to high 80s on RA.  Baseline on RA during the day with BiPAP overnight.  Completed 5 day course of levofloxacin with ongoing symptoms.  Presented to OSH 3/5 and started on IV ABX, remdesivir, and dexamethasone.  Imaging also noted PE, reports consistently taking warfarin PTA.  Initially required 4L NC then down to 2L.  Transferred to [facility] 3/6 for further management.  Weaned to RA 3/6 during the day.  During visit this morning, reports breathing feels about the same, cough still productive.  Some lightheadedness, has not been out of bed yet this morning.  Reports chronic sweats since time of transplant.  Previously with some nasal congestion that has improved.      Reports decreased appetite and PO intake with acute illness.  Occasional heartburn symptoms and some nausea/vomiting on Sunday that has improved.  Last BM 3/5, does not use bowel regimen at home.  Denies abdominal pain.   Denies difficulties with urination.  Denies LE swelling or new numbness/tingling.      Review of Systems:     Complete ROS negative except as noted in HPI.    Medical and Surgical History:     Past Medical History:   Diagnosis Date     BRENNAN (acute kidney injury) (H) 10/17/2021     Anxiety      Depression      HLD (hyperlipidemia)      HTN (hypertension)      Hypothyroidism      ILD (interstitial lung disease) (H)      SALTY on CPAP      Oxygen dependent     BL 4L since ~6/2021     Primary hypertension 2/28/2022     Rheumatoid arthritis (H)     signs ~5/2020, dx 5/2021     S/P lung transplant (H) 10/16/2021     Shock liver 10/17/2021     Steroid-induced hyperglycemia      Traction bronchiectasis (H)      Past Surgical History:   Procedure Laterality Date     BRONCHOSCOPY (RIGID OR FLEXIBLE), DIAGNOSTIC N/A 1/26/2022    Procedure: BRONCHOSCOPY, WITH BRONCHOALVEOLAR LAVAGE AND BIOPSY;  Surgeon: Perlman, David Morris, MD;  Location:  GI     BRONCHOSCOPY (RIGID OR FLEXIBLE), DIAGNOSTIC N/A 4/19/2022    Procedure: BRONCHOSCOPY, WITH BRONCHOALVEOLAR LAVAGE AND BIOPSY;  Surgeon: Sherine Merrill MD;  Location:  GI     BRONCHOSCOPY (RIGID OR FLEXIBLE), DIAGNOSTIC N/A 6/21/2022    Procedure: BRONCHOSCOPY, WITH BRONCHOALVEOLAR LAVAGE AND BIOPSY;  Surgeon: Aneesh Rubio MD;  Location:  GI     COLONOSCOPY W/ BIOPSIES AND POLYPECTOMY  07/21/2020     CV CORONARY ANGIOGRAM N/A 09/08/2021    Procedure: Coronary Angiogram with possible intervention;  Surgeon: Jovon Bullock MD;  Location:  HEART CARDIAC CATH LAB     CV RIGHT HEART CATH MEASUREMENTS RECORDED N/A 09/08/2021    Procedure: Right Heart Cath;  Surgeon: Jovon Bullock MD;  Location:  HEART CARDIAC CATH LAB     ESOPHAGOSCOPY, GASTROSCOPY, DUODENOSCOPY (EGD), COMBINED N/A 10/23/2021    Procedure: ESOPHAGOGASTRODUODENOSCOPY (EGD);  Surgeon: Miquel Pisano MD;  Location:  GI     ESOPHAGOSCOPY, GASTROSCOPY, DUODENOSCOPY (EGD), COMBINED N/A  11/02/2021    Procedure: ESOPHAGOGASTRODUODENOSCOPY (EGD);  Surgeon: Daniel Ortiz MD;  Location: [facility] GI     ESOPHAGOSCOPY, GASTROSCOPY, DUODENOSCOPY (EGD), COMBINED N/A 11/5/2021    Procedure: ESOPHAGOGASTRODUODENOSCOPY (EGD);  Surgeon: Ronnell Hernandez MD;  Location: [facility] GI     EXTRACTION(S) DENTAL N/A 09/22/2021    Procedure: EXTRACTION tooth #19;  Surgeon: Deepak Tobin DDS;  Location: [facility] OR     HERNIA REPAIR       IR CHEST TUBE PLACEMENT NON-TUNNELLED LEFT  11/03/2021     PICC TRIPLE LUMEN PLACEMENT Left 11/04/2021    5FR TL PICC. Right non occlusive thrombus subclavian vein.     REPLACE GASTROJEJUNOSTOMY TUBE, PERCUTANEOUS N/A 11/9/2021    Procedure: REPLACEMENT, GASTROJEJUNOSTOMY TUBE, PERCUTANEOUS;  Surgeon: Hernando Rodriguez MD;  Location: [facility] GI     right acl       TRACHEOSTOMY PERCUTANEOUS N/A 10/29/2021    Procedure: Percutaneous Tracheostomy,;  Surgeon: Celine Jenkins MD;  Location: [facility] OR     TRANSPLANT LUNG RECIPIENT SINGLE X2 Bilateral 10/16/2021    Procedure: TRANSPLANT, LUNG, RECIPIENT, BILATERAL, Bronchoscopy, on-pump perfusion, bilateral clamshell sternotomy;  Surgeon: Yanick Corral MD;  Location: [facility] OR     XR ACROMIOCLAVICULAR JOINT BILATERAL       Social and Family History:     Social History     Socioeconomic History     Marital status: Single     Spouse name: Not on file     Number of children: Not on file     Years of education: Not on file     Highest education level: Not on file   Occupational History     Not on file   Tobacco Use     Smoking status: Never     Smokeless tobacco: Never   Substance and Sexual Activity     Alcohol use: Never     Drug use: Never     Sexual activity: Not on file   Other Topics Concern     Parent/sibling w/ CABG, MI or angioplasty before 65F 55M? Not Asked   Social History Narrative     Not on file     Social Determinants of Health     Financial Resource Strain: Not on file   Food Insecurity: Not on file   Transportation Needs: Not  on file   Physical Activity: Not on file   Stress: Not on file   Social Connections: Not on file   Intimate Partner Violence: Not on file   Housing Stability: Not on file     Family History   Problem Relation Age of Onset     Diabetes Type 1 Mother      Heart Disease Mother      Chronic Obstructive Pulmonary Disease Mother      Rheumatoid Arthritis Father      Emphysema Paternal Grandfather      Allergies and Home Medications:   No Known Allergies  Medications Prior to Admission   Medication Sig Dispense Refill Last Dose     acetaminophen (TYLENOL) 325 MG tablet 3 tablets (975 mg) by Oral or Feeding Tube route every 8 hours as needed for mild pain 100 tablet 11 Past Week     amLODIPine (NORVASC) 10 MG tablet Take 1 tablet (10 mg) by mouth At Bedtime 30 tablet 11 3/5/2023     calcium carbonate 600 mg-vitamin D 400 units 600-400 MG-UNIT per tablet 1 tablet by Oral or Feeding Tube route daily 30 tablet 11 3/5/2023     diclofenac (VOLTAREN) 1 % topical gel Apply 4 g topically 4 times daily Both hands 150 g 11 Past Month     FLUoxetine (PROZAC) 20 MG capsule Take 20 mg by mouth daily   3/6/2023 at 0800     fluticasone (FLONASE) 50 MCG/ACT nasal spray Spray 1 spray into both nostrils daily 16 g 11 3/6/2023 at 0800     fluticasone-salmeterol (ADVAIR) 500-50 MCG/ACT inhaler Inhale 1 puff into the lungs every 12 hours 60 each 11 3/6/2023 at 0800     insulin aspart (NOVOLOG PEN) 100 UNIT/ML pen Take 1-3 units TID if BS over 200. Max daily dose is 12 15 mL 11 3/5/2023     levofloxacin (LEVAQUIN) 750 MG tablet TAKE 1 TABLET BY MOUTH ONCE DAILY FOR 14 DAYS 14 tablet 0 3/6/2023 at 0800     levothyroxine (SYNTHROID/LEVOTHROID) 25 MCG tablet Take 1 tablet (25 mcg) by mouth daily 30 tablet 11 3/6/2023 at 0730     Magnesium Glycinate POWD 300 mg 3 times daily Take 3 tablets (100mg each) three times daily 18 g 11 3/6/2023 at 0800     Melatonin 10 MG TABS tablet Take 1 tablet (10 mg) by mouth nightly as needed for sleep 30 tablet 3  Past Month     mirtazapine (REMERON) 7.5 MG tablet Take 7.5 mg by mouth At Bedtime   3/5/2023     multivitamin w/minerals (THERA-VIT-M) tablet Take 1 tablet by mouth daily 30 tablet 11 3/6/2023 at 0800     MYFORTIC (BRAND) 360 MG EC tablet Take 2 tablets (720 mg) by mouth 2 times daily 120 tablet 11 3/5/2023 at 0800     ondansetron (ZOFRAN-ODT) 4 MG ODT tab Take 1 tablet (4 mg) by mouth every 6 hours as needed for nausea 30 tablet 3 3/5/2023     pantoprazole (PROTONIX) 40 MG EC tablet Take 1 tablet (40 mg) by mouth 2 times daily (before meals) 60 tablet 11 3/5/2023     predniSONE (DELTASONE) 2.5 MG tablet Take 1 tablet (2.5 mg) by mouth every evening Total dose: 5mg in AM and 2.5 mg in PM 30 tablet 11 3/5/2023     predniSONE (DELTASONE) 5 MG tablet Take 1 tablet (5 mg) by mouth every morning AND 0.5 tablets (2.5 mg) every evening. 135 tablet 3 3/5/2023     propranolol (INDERAL) 20 MG tablet Take 2 tablets (40 mg) by mouth 2 times daily 120 tablet 11 3/5/2023     rOPINIRole (REQUIP) 0.25 MG tablet Take 0.25 mg by mouth At Bedtime   Past Week     rosuvastatin (CRESTOR) 10 MG tablet Take 1 tablet (10 mg) by mouth daily 30 tablet 11 3/5/2023     senna-docusate (SENOKOT-S/PERICOLACE) 8.6-50 MG tablet Take 2 tablets by mouth 2 times daily as needed for constipation 120 tablet 11 More than a month     sulfamethoxazole-trimethoprim (BACTRIM) 400-80 MG tablet Take 1 tablet by mouth daily 30 tablet 11 3/6/2023 at 0800     tacrolimus (GENERIC EQUIVALENT) 0.5 MG capsule Take 1 capsule (0.5 mg) by mouth daily Total dose: 2mg in AM and 2.5mg in PM 30 capsule 11 3/5/2023     tacrolimus (GENERIC EQUIVALENT) 1 MG capsule Take 2 capsules (2 mg) by mouth 2 times daily Total dose: 2mg in AM and 2.5mg in  capsule 11 3/6/2023 at 0800     tamsulosin (FLOMAX) 0.4 MG capsule Take 1 capsule (0.4 mg) by mouth daily   Past Week     warfarin ANTICOAGULANT (COUMADIN) 1 MG tablet Take 1 tablet daily (along with 2 mg tablet) as directed by  Anticoagulation Clinic. 90 tablet 1 3/5/2023     warfarin ANTICOAGULANT (COUMADIN) 2 MG tablet Take 1 to 2 tablets daily as directed by Anticoagulation Clinic. 168 tablet 1 3/5/2023     buPROPion (WELLBUTRIN XL) 150 MG 24 hr tablet Take 150 mg by mouth        DULoxetine (CYMBALTA) 30 MG capsule Take 3 capsules (90 mg) by mouth daily        insulin glargine (LANTUS PEN) 100 UNIT/ML pen Inject 3 Units Subcutaneous every morning 15 mL 0      insulin pen needle (32G X 4 MM) 32G X 4 MM miscellaneous Use 4 pen needles daily or as directed. 120 each 11      levalbuterol (XOPENEX HFA) 45 MCG/ACT inhaler Inhale 2 puffs into the lungs every 4 hours as needed for wheezing or shortness of breath / dyspnea (Patient not taking: Reported on 6/20/2022) 15 g 3      zolpidem (AMBIEN) 10 MG tablet 1/2 to 1 tablet as needed        Current Scheduled Meds    remdesivir  100 mg Intravenous Q24H    And     sodium chloride 0.9%  50 mL Intravenous Q24H     ceFEPIme  2 g Intravenous Q8H     dexamethasone  6 mg Intravenous Daily     FLUoxetine  20 mg Oral Daily     lamoTRIgine  50 mg Oral QPM     levalbuterol  1.25 mg Nebulization TID     levothyroxine  25 mcg Oral QAM AC     mirtazapine  7.5 mg Oral At Bedtime     pantoprazole  40 mg Oral QAM AC     predniSONE  2.5 mg Oral QPM     predniSONE  5 mg Oral QAM     sodium chloride (PF)  3 mL Intracatheter Q8H     sodium chloride (PF)  3 mL Intracatheter Q8H     sulfamethoxazole-trimethoprim  1 tablet Oral Daily     tacrolimus  2 mg Oral QAM     tacrolimus  2.5 mg Oral QPM     tamsulosin  0.4 mg Oral QPM     vancomycin  1,000 mg Intravenous Q18H      Current PRN Meds  glucose **OR** dextrose **OR** glucagon, lidocaine 4%, lidocaine (buffered or not buffered), - MEDICATION INSTRUCTIONS -, melatonin, sodium chloride (PF), sodium chloride (PF)     Physical Exam:     All notes, images, and labs from past 24 hours (at minimum) were reviewed.    Vital signs:  Temp: 97  F (36.1  C) Temp src: Axillary BP:  "130/75 Pulse: 69   Resp: 18 SpO2: 94 % O2 Device: None (Room air)   Height: 162.6 cm (5' 4\") Weight: 70 kg (154 lb 6.4 oz)  I/O:     Intake/Output Summary (Last 24 hours) at 3/7/2023 1419  Last data filed at 3/7/2023 1100  Gross per 24 hour   Intake 1210 ml   Output 1100 ml   Net 110 ml     Constitutional: Lying in bed, in no apparent distress.   HEENT: Eyes with pink conjunctivae, anicteric.  Oral mucosa moist without lesions.    PULM: Fair air flow bilaterally.  Scattered coarse crackles and wheezes.  No rhonchi.  Non-labored breathing on RA.  CV: Normal S1 and S2.  RRR.  No murmur, gallop, or rub.  No peripheral edema.   ABD: NABS, soft, nontender, nondistended.    MSK: Moves all extremities.  No apparent muscle wasting.   NEURO: Alert, conversant.   SKIN: Warm, dry.  No rash on limited exam.   PSYCH: Mood stable.      Lines, Drains, and Devices:  Peripheral IV 03/06/23 Anterior;Right Upper forearm (Active)   Site Assessment WDL 03/07/23 0900   Line Status Infusing 03/07/23 0900   Phlebitis Scale 0-->no symptoms 03/07/23 0900   Infiltration Scale 0 03/07/23 0900   Number of days: 1       Peripheral IV 03/07/23 Left;Posterior Lower forearm (Active)   Number of days: 0     Results:     LABS    CMP:   Recent Labs   Lab 03/07/23  1314 03/07/23  0626 03/06/23  2108 03/06/23  1845 03/06/23  1843 03/03/23  0823   NA  --  140  --   --  138  --    POTASSIUM  --  4.7  --   --  4.9  --    CHLORIDE  --  109*  --   --  105 105   CO2  --  20*  --   --  21*  --    ANIONGAP  --  11  --   --  12  --    * 170* 157* 147* 160*  --    BUN  --  20.1*  --   --  18.6  --    CR  --  1.10  --   --  1.10  --    GFRESTIMATED  --  78  --   --  78  --    ERIK  --  8.3*  --   --  8.7  --    PROTTOTAL  --   --   --   --  5.4*  --    ALBUMIN  --   --   --   --  3.4*  --    BILITOTAL  --   --   --   --  0.2  --    ALKPHOS  --   --   --   --  71  --    AST  --   --   --   --  32  --    ALT  --   --   --   --  23  --      CBC:   Recent Labs "   Lab 03/07/23  0626 03/06/23  1843   WBC 6.9 5.0   RBC 4.29* 4.31*   HGB 10.7* 10.8*   HCT 35.4* 34.8*   MCV 83 81   MCH 24.9* 25.1*   MCHC 30.2* 31.0*   RDW 15.9* 15.9*    123*       INR:   Recent Labs   Lab 03/06/23  1843 03/03/23  0823 03/03/23  0000   INR 2.57* 2.3* 2.3*       Glucose:   Recent Labs   Lab 03/07/23  1314 03/07/23  0626 03/06/23  2108 03/06/23  1845 03/06/23  1843   * 170* 157* 147* 160*       Blood Gas: No lab results found in last 7 days.    Culture Data No results for input(s): CULT in the last 168 hours.    Virology Data:   Lab Results   Component Value Date    FLUAH1 Not Detected 06/21/2022    FLUAH3 Not Detected 06/21/2022    QY3520 Not Detected 06/21/2022    IFLUB Not Detected 06/21/2022    RSVA Not Detected 06/21/2022    RSVB Not Detected 06/21/2022    PIV1 Not Detected 06/21/2022    PIV2 Not Detected 06/21/2022    PIV3 Not Detected 06/21/2022    HMPV Not Detected 06/21/2022    HRVS Negative 04/19/2022    ADVBE Negative 04/19/2022    ADVC Negative 04/19/2022    ADVC Negative 11/22/2021    ADVC Negative 11/12/2021       Historical CMV results (last 3 of prior testing):  Lab Results   Component Value Date    CMVQNT Not Detected 11/14/2022    CMVQNT Not Detected 09/12/2022    CMVQNT Not Detected 06/21/2022     No results found for: CMVLOG    Urine Studies    Recent Labs   Lab Test 11/01/21  1336 10/25/21  1507   URINEPH 5.5 5.5   NITRITE Negative Negative   LEUKEST Negative Negative   WBCU 0 2       Most Recent [place] Pulmonary Function Testing (FVC/FEV1 only)  FVC-Pre   Date Value Ref Range Status   12/20/2022 2.46 L    11/14/2022 2.12 L    09/12/2022 2.27 L    06/20/2022 2.14 L      FVC-%Pred-Pre   Date Value Ref Range Status   12/20/2022 68 %    11/14/2022 54 %    09/12/2022 57 %    06/20/2022 54 %      FEV1-Pre   Date Value Ref Range Status   12/20/2022 1.61 L    11/14/2022 1.22 L    09/12/2022 1.42 L    06/20/2022 1.29 L      FEV1-%Pred-Pre   Date Value Ref Range Status    12/20/2022 56 %    11/14/2022 39 %    09/12/2022 45 %    06/20/2022 41 %        IMAGING    No results found for this or any previous visit (from the past 48 hour(s)).

## 2023-03-07 NOTE — CONSULTS
[facility]    Transplant Infectious Diseases Inpatient Consultation      Edson Thornton Jr. MRN# [id number]   YOB: 1965 Age: 57 year old   Date of Admission and time: 3/6/2023  3:53 PM     Reason for consult: I was asked by Dr. Rubio to evaluate this patient for persistent COVID.             Recommendations:   - Continue Remdesivir 100mg daily for a total of 5 days  - As patient is now on RA discontinue Dexamethasone   - Favor stopping Cefepime and Vancomycin         Summary of Presentation:   Transplants:  10/16/2021 (Lung), Postoperative day:  507     This patient is a 57 year old man with a PMH of b/l lung transplant 10/16/2021 2/2 NSIP on Tac/MMF/Pred, RA who presents with persistent respiratory sx after initial COVID19 pneumonia 2/16/23.     2/16/23 developed acute onset SOB, MYERS, cough, fevers and was found to be positive for COVID. Reports that his entire household was sick at that time and many tested positive for COVID. He was seen by his PCP and it was arranged for him to receive Remdesivir infusions x3d at a local infusion center. States that with this he had started to feel a bit better but as soon as tx was completed his sx started to worsen again. He had persistent MYERS, cough, malaise and so he was additionally prescribed a 5d course of Prednisone 20mg on 2/27 and a 14d course of Levofloxicin on 2/28. Despite these treatments he continued to have symptoms which prompted him to present to OSH 3/5 for evaluation.     At the OSH he was hypoxic requiring 2L supplemental O2. His COVID PCR remained positive with CT 21.6. CT revealing of small R apical PE. ID was consulted and he was started on Remdesivir and Dexamethasone. His case was discussed with [facility] Transplant Pulmonology who recommended holding his MMF. Ultimately he was transferred to [facility] for Transplant Pulmonology and Transplant ID consultation.           Active Problems and Infectious Diseases Issues:    COVID19 Pneumonia   H/o b/l lung transplant 10/16/2021 on Tac/MMF/Pred   Most likely this is reactivation of COVID viral replication in the setting of lymphopenia and immunosuppression. Given he initially improved with 3d of Remdesivir and then worsened after discontinuation, potentially this was not a long enough treatment course for him. There is also potential that his outpatient steroid course contributed as, at least in studies, non-hypoxic COVID patient's who received steroids tended towards worse outcomes. His cycle threshold of 21.6 also further supports this and his imaging is c/w with COVID pneumonia. We feel that bacterial superinfection is less likely in his case, he has been afebrile, stable on RA, procal of 0.07 without clear radiographical e/o bacterial PNA and would favor discontinuation of his Vancomycin and Cefepime. In addition he symptomatically worsened while on Levofloxacin (which has similar antimicrobial spectrum to Cefepime) further supporting this.          Old Problems and Infectious Diseases Issues:   - Historical pulmonary infections 11/2021 with Klebsiella pneumoniae, Pseudomonas putida (R- Meropenem otherwise sensitive); 6/2022 nonsporulating saprophytic fungus. Recurrent respiratory colonization with Candida albicans.     Other Infectious Disease issues include:  - QTc: 377 as of 9/5/21   - PJP prophylaxis: Bactrim  - Serostatus: CMV D-/R-, EBV D+/R+, HSV1+/2+, VZV+  - Immunization status: This patient received the **reported per patient 4th dose of the COVID-19 vaccine (bivalent).  - Gamma globulin status: 502 on 1/3    Thank you very much Dr. Rubio for involving me in the care of Mr. Edson Thornton . Please do not hesitate to call me for any question.     Transplant ID will continue to follow    This patient was discussed with the attending physician, Dr Abebe            History of Present Illness:   Transplants:  10/16/2021 (Lung), Postoperative day:  507     This patient is  a 57 year old man with a PMH of b/l lung transplant 10/16/2021 2/2 NSIP on Tac/MMF/Pred, RA who presents with persistent respiratory sx after initial COVID19 pneumonia 2/16/23.     On 2/16/23 developed acute onset SOB, MYERS, cough, fevers and was found to be positive for COVID. Reports that his entire household was sick at that time and many tested positive for COVID. He was seen by his PCP and it was arranged for him to receive Remdesivir infusions x3d at a local infusion center. States that with this he had started to feel a bit better but as soon as tx was completed his sx started to worsen again. He had persistent MYERS, cough, malaise and so he was additionally prescribed a 5d course of Prednisone 20mg on 2/27 and a 14d course of Levofloxicin on 2/28. Despite these treatments he continued to have symptoms which prompted him to present to OSH 3/5 for evaluation.     At the OSH he was hypoxic requiring 2L supplemental O2. His COVID PCR remained positive with CT 21.6. ID was consulted and he was started on Remdesivir and Dexamethasone. His case was discussed with [facility] Transplant Pulmonology who recommended holding his MMF. Ultimately he was transferred to [facility] for Transplant Pulmonology and Transplant ID consultation    Currently on evaluation he is feeling ok. Not SOB at rest and has been on RA. Still some MYERS with exertion. Denies current subjective fevers, chills. Intermittent nonproductive cough. He tells me that prior to his COVID diagnosis 3 weeks ago he was feeling well, no respiratory sx at that time or chronic dyspnea.               Review of Systems:      As mentioned in the HPI otherwise negative by reviewing constitutional symptoms, central and peripheral neurological systems, respiratory system, cardiac system, GI system,  system, musculoskeletal, skin, allergy, and lymphatics.                  Past Medical History:     Past Medical History:   Diagnosis Date     BRENNAN (acute kidney injury) (H) 10/17/2021      Anxiety      Depression      HLD (hyperlipidemia)      HTN (hypertension)      Hypothyroidism      ILD (interstitial lung disease) (H)      SALTY on CPAP      Oxygen dependent     BL 4L since ~6/2021     Primary hypertension 2/28/2022     Rheumatoid arthritis (H)     signs ~5/2020, dx 5/2021     S/P lung transplant (H) 10/16/2021     Shock liver 10/17/2021     Steroid-induced hyperglycemia      Traction bronchiectasis (H)             Past Surgical History:     Past Surgical History:   Procedure Laterality Date     BRONCHOSCOPY (RIGID OR FLEXIBLE), DIAGNOSTIC N/A 1/26/2022    Procedure: BRONCHOSCOPY, WITH BRONCHOALVEOLAR LAVAGE AND BIOPSY;  Surgeon: Perlman, David Morris, MD;  Location:  GI     BRONCHOSCOPY (RIGID OR FLEXIBLE), DIAGNOSTIC N/A 4/19/2022    Procedure: BRONCHOSCOPY, WITH BRONCHOALVEOLAR LAVAGE AND BIOPSY;  Surgeon: Sherine Merrill MD;  Location: [facility]     BRONCHOSCOPY (RIGID OR FLEXIBLE), DIAGNOSTIC N/A 6/21/2022    Procedure: BRONCHOSCOPY, WITH BRONCHOALVEOLAR LAVAGE AND BIOPSY;  Surgeon: Aneesh Rubio MD;  Location:  GI     COLONOSCOPY W/ BIOPSIES AND POLYPECTOMY  07/21/2020     CV CORONARY ANGIOGRAM N/A 09/08/2021    Procedure: Coronary Angiogram with possible intervention;  Surgeon: Jovon Bullock MD;  Location:  HEART CARDIAC CATH LAB     CV RIGHT HEART CATH MEASUREMENTS RECORDED N/A 09/08/2021    Procedure: Right Heart Cath;  Surgeon: Jovon Bullock MD;  Location:  HEART CARDIAC CATH LAB     ESOPHAGOSCOPY, GASTROSCOPY, DUODENOSCOPY (EGD), COMBINED N/A 10/23/2021    Procedure: ESOPHAGOGASTRODUODENOSCOPY (EGD);  Surgeon: Miquel Pisano MD;  Location:  GI     ESOPHAGOSCOPY, GASTROSCOPY, DUODENOSCOPY (EGD), COMBINED N/A 11/02/2021    Procedure: ESOPHAGOGASTRODUODENOSCOPY (EGD);  Surgeon: Dainel Ortiz MD;  Location:  GI     ESOPHAGOSCOPY, GASTROSCOPY, DUODENOSCOPY (EGD), COMBINED N/A 11/5/2021    Procedure: ESOPHAGOGASTRODUODENOSCOPY (EGD);   Surgeon: Ronnell Hernandez MD;  [facility]: [facility]     EXTRACTION(S) DENTAL N/A 09/22/2021    Procedure: EXTRACTION tooth #19;  Surgeon: Deepak Tobin DDS;  [facility]     HERNIA REPAIR       IR CHEST TUBE PLACEMENT NON-TUNNELLED LEFT  11/03/2021     PICC TRIPLE LUMEN PLACEMENT Left 11/04/2021    5FR TL PICC. Right non occlusive thrombus subclavian vein.     REPLACE GASTROJEJUNOSTOMY TUBE, PERCUTANEOUS N/A 11/9/2021    Procedure: REPLACEMENT, GASTROJEJUNOSTOMY TUBE, PERCUTANEOUS;  Surgeon: Hernando Rodriguez MD;  [facility]: [facility]     right acl       TRACHEOSTOMY PERCUTANEOUS N/A 10/29/2021    Procedure: Percutaneous Tracheostomy,;  Surgeon: Celine Jenkins MD;  [facility]     TRANSPLANT LUNG RECIPIENT SINGLE X2 Bilateral 10/16/2021    Procedure: TRANSPLANT, LUNG, RECIPIENT, BILATERAL, Bronchoscopy, on-pump perfusion, bilateral clamshell sternotomy;  Surgeon: Yanick Corral MD;  [facility]     XR ACROMIOCLAVICULAR JOINT BILATERAL            Social History:     Social History     Tobacco Use     Smoking status: Never     Smokeless tobacco: Never   Substance Use Topics     Alcohol use: Never          Family History:   I have reviewed this patient's family history  Family History   Problem Relation Age of Onset     Diabetes Type 1 Mother      Heart Disease Mother      Chronic Obstructive Pulmonary Disease Mother      Rheumatoid Arthritis Father      Emphysema Paternal Grandfather           Immunizations:     Immunization History   Administered Date(s) Administered     COVID-19 Vaccine 12+ (Pfizer 2022) 09/13/2022, 10/04/2022     COVID-19 Vaccine Bivalent Booster 12+ (Pfizer) 01/27/2023     Flu, Unspecified 11/14/2022     Influenza Vaccine 50-64 or 18-64 w/egg allergy (Flublok) 01/19/2022, 11/14/2022     Pneumococcal 20 valent Conjugate (Prevnar 20) 12/20/2022     Tdap (Adacel,Boostrix) 02/15/2018     Zoster vaccine recombinant adjuvanted (SHINGRIX) 10/22/2018, 07/09/2019           Allergies:   No Known Allergies          Medications:   Medications that Require Transfusion:     heparin 1,000 Units/hr (03/07/23 0910)     - MEDICATION INSTRUCTIONS -       Scheduled Medications:     remdesivir  100 mg Intravenous Q24H    And     sodium chloride 0.9%  50 mL Intravenous Q24H     FLUoxetine  20 mg Oral Daily     lamoTRIgine  50 mg Oral QPM     levalbuterol  1.25 mg Nebulization TID     levothyroxine  25 mcg Oral QAM AC     mirtazapine  7.5 mg Oral At Bedtime     pantoprazole  40 mg Oral QAM AC     predniSONE  2.5 mg Oral QPM     predniSONE  5 mg Oral QAM     sodium chloride (PF)  3 mL Intracatheter Q8H     sodium chloride (PF)  3 mL Intracatheter Q8H     sulfamethoxazole-trimethoprim  1 tablet Oral Daily     tacrolimus  2 mg Oral QAM     tacrolimus  2.5 mg Oral QPM     tamsulosin  0.4 mg Oral QPM             Physical Exam:   Temp: 97  F (36.1  C) Temp src: Axillary BP: 130/75 Pulse: 69   Resp: 18 SpO2: 94 % O2 Device: None (Room air)      Wt Readings from Last 4 Encounters:   03/07/23 70 kg (154 lb 6.4 oz)   12/20/22 73 kg (161 lb)   09/12/22 74.8 kg (165 lb)   06/29/22 72.3 kg (159 lb 8 oz)     Constitutional: awake, alert, cooperative, no apparent distress and appears at stated age, well nourished.   Head, ENT, Eyes, and Neck: Normocephalic, sinuses non-tender to palpation, external ears without lesions, moist buccal mucosa without oral thrush or ulcers, tonsils without swelling, erythema, or exudate, good dentition, gums without necrosis or abscesses.   PERRL, EOMI, pink conjunctivae, non-icteric sclera.   Neck supple without rigidity, no cervical LA bilaterally.    Neurologic: Patient is moving all extremities without focal deficit   Lungs: CTA bilaterally, no accessory muscle use  CVS: RRR, normal S1/S2, no murmur, PMI was not displaced.   Abdomen: non-tender, non-distended, no masses, no bruit, no shifting dullness, normal BS.   Extremities: no pitting edema of bilateral lower extremities, no  ulcers, no swelling or erythema of any of joints and no tenderness to palpation.   Skin: no obvious rash            Data:   No results found for: ACD4    Inflammatory Markers    Recent Labs   Lab Test 03/06/23  1843 06/29/22  1111 10/29/21  0542 10/14/21  0943 10/12/21  1038 10/10/21  1024 10/08/21  0947 10/06/21  1007 10/04/21  0432   SED 22* 29* 87*  --   --   --   --   --   --    CRP  --  17.3* 53.0* 23.0* 17.0* 30.0* 34.0* 35.0* 23.0*     Immune Globulin Studies     Recent Labs   Lab Test 01/03/22  0743 12/30/21  0730 12/15/21  0647 11/17/21  0337 10/15/21  0907 09/07/21  1324 09/07/21  1100   * 446* 365* 198* 265* 363*  363*  --    IGM  --   --   --   --   --  51  --    IGE  --   --   --   --   --   --  83   IGA  --   --   --   --   --  103  --      Metabolic Studies       Recent Labs   Lab Test 03/07/23  1314 03/07/23  0626 03/06/23  2108 03/06/23  1845 03/06/23  1843 03/03/23  0823 02/28/23  0848 02/24/23  0903 02/07/23  0754 12/23/22  0908 12/20/22  0746 11/18/22  0848 11/14/22  0726 09/16/22  0912 09/12/22  0852 07/06/22  0914 06/29/22  1111 02/28/22  0841 02/24/22  1000 12/16/21  1912 12/16/21  1757 12/02/21  2030 12/02/21  1816 10/22/21  1602 10/22/21  1601 10/22/21  0959 10/22/21  0958   NA  --  140  --   --  138  --   --   --   --   --  149*  --  145  --  141  --  146*   < > 141   < >  --    < >  --    < > 147*  --  147*   POTASSIUM  --  4.7  --   --  4.9  --   --   --   --   --  4.0  --  4.4  --  4.1  --  4.0   < > 3.9   < >  --    < >  --    < > 3.6  --  3.5   CHLORIDE  --  109*  --   --  105 105 109 108 109   < > 111*   < > 106   < > 105   < > 109   < > 108   < >  --    < >  --    < > 109  --  106   CO2  --  20*  --   --  21*  --   --   --   --   --  27  --  29  --  23  --  27   < > 29   < >  --    < >  --    < > 31  --  34*   ANIONGAP  --  11  --   --  12  --   --   --   --   --  11  --  10  --  13  --  10   < > 4   < >  --    < >  --    < > 7  --  7   BUN  --  20.1*  --   --  18.6  --   --    --   --   --  19.1  --  22.8*  --  22.0*  --  20   < > 21   < >  --    < >  --    < > 68*  --  62*   CR  --  1.10  --   --  1.10  --   --   --   --   --  1.36*  --  1.65*  --  1.22*  --  1.24   < > 0.87   < >  --    < >  --    < > 1.47*  --  1.54*   GFRESTIMATED  --  78  --   --  78  --   --   --   --   --  61  --  48*  --  69  --  68   < > >90   < >  --    < >  --    < > 53*  --  50*   * 170* 157* 147* 160*  --   --   --   --   --  93  --  108*  --  111*  --  145*   < > 98   < >  --    < >  --    < > 148*   < > 92   A1C  --   --   --   --   --   --   --   --   --   --   --   --  6.1*  --   --   --   --   --   --   --   --   --   --    < >  --   --   --    ERIK  --  8.3*  --   --  8.7  --   --   --   --   --  9.9  --  10.1*  --  9.4  --  9.7   < > 9.2   < >  --    < >  --    < > 8.2*  --  7.9*   PHOS  --   --   --   --   --   --   --   --   --   --   --   --  3.7  --   --   --   --   --  2.6   < >  --    < >  --    < > 2.8  --   --    MAG  --   --   --   --   --   --   --   --   --   --  1.8  --  1.6*  --  1.7  --  1.7   < > 1.5*   < >  --    < >  --    < > 2.1  --   --    LACT  --   --   --   --   --   --   --   --   --   --   --   --   --   --   --   --   --   --   --   --  1.6  --  1.7   < >  --    < >  --    CKT  --   --   --   --   --   --   --   --   --   --   --   --   --   --   --   --   --   --   --   --   --   --   --   --  51  --  55    < > = values in this interval not displayed.     Hepatic Studies    Recent Labs   Lab Test 03/06/23  1843 11/14/22  0726 03/02/22  1631 12/23/21  0714 12/12/21  0519 12/10/21  0647 09/30/21  1059 09/19/21  1629   BILITOTAL 0.2 0.3 0.2 0.2 0.2 0.5   < >  --    ALKPHOS 71 101 69 91 104 105   < >  --    ALBUMIN 3.4* 4.6 3.5 2.8* 2.5* 2.5*   < >  --    AST 32 23 24 22 17 19   < >  --    ALT 23 26 32 31 26 24   < >  --    LDH  --   --   --   --   --   --   --  423*    < > = values in this interval not displayed.     Pancreatitis testing    Recent Labs   Lab Test  11/14/22  0726 10/22/21  0407 10/21/21  0354 10/20/21  0308 10/19/21  0338 09/07/21  1324 09/07/21  1100   AMYLASE  --   --   --   --   --   --  32   TRIG 211* 307* 486* 383* 458* 364*  --      Hematology Studies      Recent Labs   Lab Test 03/07/23  0626 03/06/23  1843 12/20/22  0746 09/12/22  0852 06/29/22  1111 06/22/22  0620 03/15/22  0803 03/04/22  1500 11/02/21  1302 11/02/21  1053 11/02/21  0832 11/02/21  0815 11/02/21  0556   WBC 6.9 5.0 5.9 7.8 5.6 6.2   < > 9.8   < > 57.4*  57.4*   < > 37.5* 32.6*   ANEU 5.7 4.2  --   --   --   --   --  9.0*  --  49.9*  --  28.5* 29.7*   ALYM 0.7* 0.3*  --   --   --   --   --  0.4*  --  4.0  --  4.9 1.3   DONALD 0.5 0.5  --   --   --   --   --  0.4  --  1.7*  --  2.3* 0.7   AEOS 0.0 0.0  --   --   --   --   --  0.0  --  0.0  --  0.0 0.0   HGB 10.7* 10.8* 11.4* 11.0* 10.3* 9.9*   < > 11.5*   < > 8.8*   < > 5.8* 8.2*   HCT 35.4* 34.8* 37.6* 35.1* 33.3* 31.4*   < > 35.9*   < > 26.8*   < > 19.6* 27.3*    123* 215 217 189 190   < > 221   < > 244   < > 382 355    < > = values in this interval not displayed.     Clotting Studies    Recent Labs   Lab Test 03/06/23  1843 03/03/23  0823 03/03/23  0000 02/24/23  0903 06/29/22  1111 06/21/22  0811 11/03/21  0407 11/02/21  1053 11/02/21  0927 11/02/21  0908 11/02/21  0832   INR 2.57* 2.3* 2.3* 4.0*   < > 1.07   < > 1.55*  --    < > 1.38*   PTT  --   --   --   --   --  31  --  34 32  --  50*    < > = values in this interval not displayed.     Arterial Blood Gas Testing    Recent Labs   Lab Test 12/01/21  0725 11/22/21  0426 11/22/21  0103 11/21/21  1029 11/14/21  0534 11/11/21  1740 11/07/21  0626 11/06/21  0438 11/05/21  0504 11/05/21  0336 11/04/21  1016   PH  --   --   --   --   --  7.48*  --  7.43 7.49* 7.49* 7.46*   PCO2  --   --   --   --   --  37  --  37 31* 32* 35   PO2  --   --   --   --   --  106*  --  109* 126* 331* 95   HCO3  --   --   --   --   --  28  --  25 24 24 25   O2PER 2 40 40 40   < > 40   < > 40 40 40 40    <  > = values in this interval not displayed.      Urine Studies     Recent Labs   Lab Test 11/01/21  1336 10/25/21  1507 10/22/21  1641 10/17/21  1642 10/17/21  1041   URINEPH 5.5 5.5 7.5* 5.0 5.0   NITRITE Negative Negative Negative Negative Negative   LEUKEST Negative Negative Negative Negative Trace*   WBCU 0 2 3 25* 44*     Vancomycin Levels     Recent Labs   Lab Test 03/07/23  0935 10/18/21  0403   VANCOMYCIN 21.3 12.6     Tobramycin levels     No lab results found.    Gentamicin levels    No lab results found.    Tacrolimus levels    Invalid input(s): TACROLIMUS, TAC, TACR  Transplant Immunosuppression Labs Latest Ref Rng & Units 3/7/2023 3/6/2023 12/20/2022 11/14/2022 9/12/2022   Creat 0.67 - 1.17 mg/dL 1.10 1.10 1.36(H) 1.65(H) 1.22(H)   UREA NITROGEN 7 - 30 mg/dL - - - - -   UREA NITROGEN (R) 6.0 - 20.0 mg/dL 20.1(H) 18.6 19.1 22.8(H) 22.0(H)   WBC 4.0 - 11.0 10e3/uL 6.9 5.0 5.9 - 7.8   Neutrophil % 83 83 - - -   ANEU 1.6 - 8.3 10e3/uL 5.7 4.2 - - -     Cyclosporine levels    Invalid input(s): CYCLOSPORINE, CYC    Mycophenolate levels    Invalid input(s): MYPA, MYP    Sirolimus levels    Invalid input(s): SIROLIMUS, SIR, RAPA    CSF testing     Recent Labs   Lab Test 10/29/21  1221   CWBC 80*   CNEU 94   CLYM 3   CMONO 4   CRBC 9,000*   CGLU 83*   CTP 97*         Microbiology:  OSH labs:  COVID PCR + CT 21.6  Influenza/ RSV PCR negative   NP RVP negative   Legionella urine ag negative   S.pneumo urine ag negative    3/7/22   CMV PCR blood pending   Sputum culture, fungal sputum cx pending     Last check of C difficile  C Difficile Toxin B by PCR   Date Value Ref Range Status   02/28/2022 Negative Negative Final     Comment:     A negative result does not exclude actual disease due to C. difficile and may be due to improper collection, handling and storage of the specimen or the number of organisms in the specimen is below the detection limit of the assay.     Virology:  No results found for: H6RES    EBV DNA  Copies/mL   Date Value Ref Range Status   12/20/2022 Not Detected Not Detected copies/mL Final   11/14/2022 Not Detected Not Detected copies/mL Final   09/12/2022 Not Detected Not Detected copies/mL Final   06/20/2022 Not Detected Not Detected copies/mL Final   04/18/2022 Not Detected Not Detected copies/mL Final   03/15/2022 Not Detected Not Detected copies/mL Final   02/10/2022 Not Detected Not Detected copies/mL Final   02/07/2022 Not Detected Not Detected copies/mL Final   01/17/2022 Not Detected Not Detected copies/mL Final   01/07/2022 Not Detected Not Detected copies/mL Final   12/15/2021 Not Detected Not Detected copies/mL Final   11/16/2021 Not Detected Not Detected copies/mL Final       CMV Antibody IgG   Date Value Ref Range Status   03/07/2023 No detectable antibody. No detectable antibody.  Final   10/15/2021 No detectable antibody. No detectable antibody.  Final   09/07/2021 No detectable antibody. No detectable antibody.  Final       Toxoplasma Antibody IgG   Date Value Ref Range Status   09/07/2021 <3.0 0.0 - 7.1 IU/mL Final     Comment:     Negative- Absence of detectable Toxoplasma gondii IgG antibodies. A negative result does not rule out acute infection.         Imaging:  3/5/23 CTA CHEST OSH   IMPRESSION:   1.  2 small filling defects are seen in the right apical pulmonary artery, which are suspicious for subtle acute pulmonary emboli.   2.  Bilateral interstitial lung disease in the setting of rheumatoid arthritis.  Overall, findings appear less apparent than on 8/30/2021.  However, given the somewhat peripheral nature of this, correlation is recommended for any signs or symptoms of superimposed viral pneumonia such as COVID-19 pneumonia based on CT findings.   3.  No aortic dissection.   4.  Additional chronic findings as described.     ECHO  9/5/21 TTE   Interpretation Summary  Small biventricular size with hyperdynamic biventricular function.  The inferior vena cava was normal in size with  preserved respiratory  variability.  No pericardial effusion is present.      Brenda Grijalva MD  [facility]  Contact information available via [facility] Paging/Directory  03/07/2023

## 2023-03-07 NOTE — PLAN OF CARE
Goal Outcome Evaluation:    No significant change in status today; continues to feel fatigued and somewhat weak but up with minimal SBA in room.   Heparin gtt remains at 1000u/hr with next 10a level in a.m. Heme consult to evaluate pt developing a PE while therapeutic on anticoagulant.  Sats maintained on RA during day; using CPAP at night. Sputum cx sent. MYERS but recovers rather quickly once at rest.   BM x2 today. Appetite ok. Denies pain.  Continues with IV abxs and remdezivir & steroids. Followed by transplant ID.

## 2023-03-08 ENCOUNTER — APPOINTMENT (OUTPATIENT)
Dept: ULTRASOUND IMAGING | Facility: CLINIC | Age: 58
DRG: 177 | End: 2023-03-08
Attending: STUDENT IN AN ORGANIZED HEALTH CARE EDUCATION/TRAINING PROGRAM
Payer: COMMERCIAL

## 2023-03-08 LAB
ANION GAP SERPL CALCULATED.3IONS-SCNC: 11 MMOL/L (ref 7–15)
APTT PPP: 106 SECONDS (ref 22–38)
BUN SERPL-MCNC: 18.5 MG/DL (ref 6–20)
CALCIUM SERPL-MCNC: 8.5 MG/DL (ref 8.6–10)
CHLORIDE SERPL-SCNC: 109 MMOL/L (ref 98–107)
CMV DNA SPEC NAA+PROBE-ACNC: NOT DETECTED IU/ML
CREAT SERPL-MCNC: 0.95 MG/DL (ref 0.67–1.17)
CRP SERPL-MCNC: 23.1 MG/L
D DIMER PPP FEU-MCNC: <0.27 UG/ML FEU (ref 0–0.5)
DEPRECATED HCO3 PLAS-SCNC: 19 MMOL/L (ref 22–29)
EBV DNA # SPEC NAA+PROBE: NOT DETECTED COPIES/ML
ERYTHROCYTE [DISTWIDTH] IN BLOOD BY AUTOMATED COUNT: 15.9 % (ref 10–15)
GFR SERPL CREATININE-BSD FRML MDRD: >90 ML/MIN/1.73M2
GLUCOSE BLDC GLUCOMTR-MCNC: 114 MG/DL (ref 70–99)
GLUCOSE BLDC GLUCOMTR-MCNC: 132 MG/DL (ref 70–99)
GLUCOSE BLDC GLUCOMTR-MCNC: 134 MG/DL (ref 70–99)
GLUCOSE BLDC GLUCOMTR-MCNC: 161 MG/DL (ref 70–99)
GLUCOSE BLDC GLUCOMTR-MCNC: 164 MG/DL (ref 70–99)
GLUCOSE SERPL-MCNC: 257 MG/DL (ref 70–99)
HCT VFR BLD AUTO: 33 % (ref 40–53)
HGB BLD-MCNC: 10 G/DL (ref 13.3–17.7)
IGG SERPL-MCNC: 168 MG/DL (ref 610–1616)
INR PPP: 2.09 (ref 0.85–1.15)
MCH RBC QN AUTO: 24.9 PG (ref 26.5–33)
MCHC RBC AUTO-ENTMCNC: 30.3 G/DL (ref 31.5–36.5)
MCV RBC AUTO: 82 FL (ref 78–100)
PLATELET # BLD AUTO: 132 10E3/UL (ref 150–450)
POTASSIUM SERPL-SCNC: 4.3 MMOL/L (ref 3.4–5.3)
RBC # BLD AUTO: 4.01 10E6/UL (ref 4.4–5.9)
SODIUM SERPL-SCNC: 139 MMOL/L (ref 136–145)
UFH PPP CHRO-ACNC: 0.73 IU/ML
UFH PPP CHRO-ACNC: >1.1 IU/ML
WBC # BLD AUTO: 5.2 10E3/UL (ref 4–11)

## 2023-03-08 PROCEDURE — 250N000011 HC RX IP 250 OP 636

## 2023-03-08 PROCEDURE — 99231 SBSQ HOSP IP/OBS SF/LOW 25: CPT | Mod: GC | Performed by: INTERNAL MEDICINE

## 2023-03-08 PROCEDURE — 250N000012 HC RX MED GY IP 250 OP 636 PS 637: Performed by: STUDENT IN AN ORGANIZED HEALTH CARE EDUCATION/TRAINING PROGRAM

## 2023-03-08 PROCEDURE — 86833 HLA CLASS II HIGH DEFIN QUAL: CPT | Performed by: PHYSICIAN ASSISTANT

## 2023-03-08 PROCEDURE — 87449 NOS EACH ORGANISM AG IA: CPT | Performed by: PHYSICIAN ASSISTANT

## 2023-03-08 PROCEDURE — 258N000003 HC RX IP 258 OP 636: Performed by: STUDENT IN AN ORGANIZED HEALTH CARE EDUCATION/TRAINING PROGRAM

## 2023-03-08 PROCEDURE — 250N000012 HC RX MED GY IP 250 OP 636 PS 637

## 2023-03-08 PROCEDURE — 82310 ASSAY OF CALCIUM: CPT | Performed by: STUDENT IN AN ORGANIZED HEALTH CARE EDUCATION/TRAINING PROGRAM

## 2023-03-08 PROCEDURE — 85027 COMPLETE CBC AUTOMATED: CPT | Performed by: STUDENT IN AN ORGANIZED HEALTH CARE EDUCATION/TRAINING PROGRAM

## 2023-03-08 PROCEDURE — 93970 EXTREMITY STUDY: CPT | Mod: 26 | Performed by: RADIOLOGY

## 2023-03-08 PROCEDURE — 86832 HLA CLASS I HIGH DEFIN QUAL: CPT | Performed by: PHYSICIAN ASSISTANT

## 2023-03-08 PROCEDURE — 214N000001 HC R&B CCU UMMC

## 2023-03-08 PROCEDURE — 86140 C-REACTIVE PROTEIN: CPT | Performed by: STUDENT IN AN ORGANIZED HEALTH CARE EDUCATION/TRAINING PROGRAM

## 2023-03-08 PROCEDURE — 36415 COLL VENOUS BLD VENIPUNCTURE: CPT | Performed by: STUDENT IN AN ORGANIZED HEALTH CARE EDUCATION/TRAINING PROGRAM

## 2023-03-08 PROCEDURE — 250N000013 HC RX MED GY IP 250 OP 250 PS 637

## 2023-03-08 PROCEDURE — 999N000157 HC STATISTIC RCP TIME EA 10 MIN

## 2023-03-08 PROCEDURE — 87102 FUNGUS ISOLATION CULTURE: CPT | Performed by: PHYSICIAN ASSISTANT

## 2023-03-08 PROCEDURE — 250N000009 HC RX 250: Performed by: PHYSICIAN ASSISTANT

## 2023-03-08 PROCEDURE — 85379 FIBRIN DEGRADATION QUANT: CPT | Performed by: STUDENT IN AN ORGANIZED HEALTH CARE EDUCATION/TRAINING PROGRAM

## 2023-03-08 PROCEDURE — 250N000013 HC RX MED GY IP 250 OP 250 PS 637: Performed by: STUDENT IN AN ORGANIZED HEALTH CARE EDUCATION/TRAINING PROGRAM

## 2023-03-08 PROCEDURE — 85520 HEPARIN ASSAY: CPT | Performed by: STUDENT IN AN ORGANIZED HEALTH CARE EDUCATION/TRAINING PROGRAM

## 2023-03-08 PROCEDURE — 87305 ASPERGILLUS AG IA: CPT | Performed by: PHYSICIAN ASSISTANT

## 2023-03-08 PROCEDURE — 87077 CULTURE AEROBIC IDENTIFY: CPT | Performed by: STUDENT IN AN ORGANIZED HEALTH CARE EDUCATION/TRAINING PROGRAM

## 2023-03-08 PROCEDURE — 36415 COLL VENOUS BLD VENIPUNCTURE: CPT | Performed by: PHYSICIAN ASSISTANT

## 2023-03-08 PROCEDURE — 85610 PROTHROMBIN TIME: CPT | Performed by: STUDENT IN AN ORGANIZED HEALTH CARE EDUCATION/TRAINING PROGRAM

## 2023-03-08 PROCEDURE — 250N000011 HC RX IP 250 OP 636: Performed by: STUDENT IN AN ORGANIZED HEALTH CARE EDUCATION/TRAINING PROGRAM

## 2023-03-08 PROCEDURE — 94640 AIRWAY INHALATION TREATMENT: CPT

## 2023-03-08 PROCEDURE — 94660 CPAP INITIATION&MGMT: CPT

## 2023-03-08 PROCEDURE — 94640 AIRWAY INHALATION TREATMENT: CPT | Mod: 76

## 2023-03-08 PROCEDURE — 87106 FUNGI IDENTIFICATION YEAST: CPT | Performed by: PHYSICIAN ASSISTANT

## 2023-03-08 PROCEDURE — 99233 SBSQ HOSP IP/OBS HIGH 50: CPT | Mod: GC | Performed by: STUDENT IN AN ORGANIZED HEALTH CARE EDUCATION/TRAINING PROGRAM

## 2023-03-08 PROCEDURE — 99233 SBSQ HOSP IP/OBS HIGH 50: CPT | Mod: CS | Performed by: PHYSICIAN ASSISTANT

## 2023-03-08 PROCEDURE — 87205 SMEAR GRAM STAIN: CPT | Performed by: STUDENT IN AN ORGANIZED HEALTH CARE EDUCATION/TRAINING PROGRAM

## 2023-03-08 PROCEDURE — 85730 THROMBOPLASTIN TIME PARTIAL: CPT | Performed by: STUDENT IN AN ORGANIZED HEALTH CARE EDUCATION/TRAINING PROGRAM

## 2023-03-08 PROCEDURE — 93970 EXTREMITY STUDY: CPT

## 2023-03-08 RX ORDER — ACETAMINOPHEN 325 MG/1
650 TABLET ORAL ONCE
Status: COMPLETED | OUTPATIENT
Start: 2023-03-08 | End: 2023-03-08

## 2023-03-08 RX ORDER — DIPHENHYDRAMINE HYDROCHLORIDE 50 MG/ML
50 INJECTION INTRAMUSCULAR; INTRAVENOUS
Status: DISCONTINUED | OUTPATIENT
Start: 2023-03-08 | End: 2023-03-09

## 2023-03-08 RX ORDER — DIPHENHYDRAMINE HCL 50 MG
50 CAPSULE ORAL
Status: DISCONTINUED | OUTPATIENT
Start: 2023-03-08 | End: 2023-03-09

## 2023-03-08 RX ORDER — WARFARIN SODIUM 5 MG/1
5 TABLET ORAL
Status: COMPLETED | OUTPATIENT
Start: 2023-03-08 | End: 2023-03-08

## 2023-03-08 RX ORDER — AMLODIPINE BESYLATE 10 MG/1
10 TABLET ORAL DAILY
Status: DISCONTINUED | OUTPATIENT
Start: 2023-03-08 | End: 2023-03-14 | Stop reason: HOSPADM

## 2023-03-08 RX ORDER — DIPHENHYDRAMINE HCL 50 MG
50 CAPSULE ORAL ONCE
Status: COMPLETED | OUTPATIENT
Start: 2023-03-08 | End: 2023-03-08

## 2023-03-08 RX ORDER — HYDROXYCHLOROQUINE SULFATE 200 MG/1
200 TABLET, FILM COATED ORAL 2 TIMES DAILY
COMMUNITY
End: 2023-06-26

## 2023-03-08 RX ORDER — HEPARIN SODIUM 10000 [USP'U]/100ML
0-5000 INJECTION, SOLUTION INTRAVENOUS CONTINUOUS
Status: DISCONTINUED | OUTPATIENT
Start: 2023-03-08 | End: 2023-03-11

## 2023-03-08 RX ORDER — DIPHENHYDRAMINE HYDROCHLORIDE 50 MG/ML
50 INJECTION INTRAMUSCULAR; INTRAVENOUS ONCE
Status: COMPLETED | OUTPATIENT
Start: 2023-03-08 | End: 2023-03-08

## 2023-03-08 RX ORDER — METHYLPREDNISOLONE SODIUM SUCCINATE 125 MG/2ML
125 INJECTION, POWDER, LYOPHILIZED, FOR SOLUTION INTRAMUSCULAR; INTRAVENOUS
Status: DISCONTINUED | OUTPATIENT
Start: 2023-03-08 | End: 2023-03-09

## 2023-03-08 RX ORDER — LAMOTRIGINE 25 MG/1
200 TABLET ORAL DAILY
COMMUNITY

## 2023-03-08 RX ADMIN — PREDNISONE 2.5 MG: 2.5 TABLET ORAL at 17:19

## 2023-03-08 RX ADMIN — WARFARIN SODIUM 5 MG: 5 TABLET ORAL at 17:19

## 2023-03-08 RX ADMIN — SULFAMETHOXAZOLE AND TRIMETHOPRIM 1 TABLET: 400; 80 TABLET ORAL at 08:18

## 2023-03-08 RX ADMIN — AMLODIPINE BESYLATE 10 MG: 10 TABLET ORAL at 10:45

## 2023-03-08 RX ADMIN — REMDESIVIR 100 MG: 100 INJECTION, POWDER, LYOPHILIZED, FOR SOLUTION INTRAVENOUS at 19:34

## 2023-03-08 RX ADMIN — MIRTAZAPINE 7.5 MG: 7.5 TABLET, FILM COATED ORAL at 21:28

## 2023-03-08 RX ADMIN — LEVOTHYROXINE SODIUM 25 MCG: 0.03 TABLET ORAL at 08:19

## 2023-03-08 RX ADMIN — TACROLIMUS 2 MG: 1 CAPSULE ORAL at 08:18

## 2023-03-08 RX ADMIN — TACROLIMUS 2.5 MG: 1 CAPSULE ORAL at 17:19

## 2023-03-08 RX ADMIN — HEPARIN SODIUM 1250 UNITS/HR: 10000 INJECTION, SOLUTION INTRAVENOUS at 19:01

## 2023-03-08 RX ADMIN — FLUOXETINE 20 MG: 20 CAPSULE ORAL at 08:19

## 2023-03-08 RX ADMIN — SODIUM CHLORIDE 50 ML: 9 INJECTION, SOLUTION INTRAVENOUS at 19:36

## 2023-03-08 RX ADMIN — LAMOTRIGINE 50 MG: 25 TABLET ORAL at 19:35

## 2023-03-08 RX ADMIN — LEVALBUTEROL HYDROCHLORIDE 1.25 MG: 1.25 SOLUTION RESPIRATORY (INHALATION) at 09:03

## 2023-03-08 RX ADMIN — PREDNISONE 5 MG: 5 TABLET ORAL at 08:19

## 2023-03-08 RX ADMIN — TAMSULOSIN HYDROCHLORIDE 0.4 MG: 0.4 CAPSULE ORAL at 18:58

## 2023-03-08 RX ADMIN — ACETAMINOPHEN 650 MG: 325 TABLET ORAL at 14:29

## 2023-03-08 RX ADMIN — LEVALBUTEROL HYDROCHLORIDE 1.25 MG: 1.25 SOLUTION RESPIRATORY (INHALATION) at 13:47

## 2023-03-08 RX ADMIN — INSULIN ASPART 2 UNITS: 100 INJECTION, SOLUTION INTRAVENOUS; SUBCUTANEOUS at 08:19

## 2023-03-08 RX ADMIN — DIPHENHYDRAMINE HYDROCHLORIDE 50 MG: 50 CAPSULE ORAL at 14:29

## 2023-03-08 RX ADMIN — HUMAN IMMUNOGLOBULIN G 30 G: 20 LIQUID INTRAVENOUS at 15:02

## 2023-03-08 RX ADMIN — LEVALBUTEROL HYDROCHLORIDE 1.25 MG: 1.25 SOLUTION RESPIRATORY (INHALATION) at 20:18

## 2023-03-08 RX ADMIN — PANTOPRAZOLE SODIUM 40 MG: 40 TABLET, DELAYED RELEASE ORAL at 08:19

## 2023-03-08 ASSESSMENT — ACTIVITIES OF DAILY LIVING (ADL)
ADLS_ACUITY_SCORE: 27
ADLS_ACUITY_SCORE: 29
ADLS_ACUITY_SCORE: 27
ADLS_ACUITY_SCORE: 29
ADLS_ACUITY_SCORE: 27

## 2023-03-08 NOTE — PLAN OF CARE
"Goal Outcome Evaluation:    BP (!) 137/96 (BP Location: Left arm, Patient Position: Semi-Gaines's)   Pulse 78   Temp 99  F (37.2  C) (Oral)   Resp 18   Ht 1.626 m (5' 4\")   Wt 70 kg (154 lb 6.4 oz)   SpO2 95%   BMI 26.50 kg/m      [id number]    Admitted on 03/06/23 due to acute hypoxic respiratory failure 2/2 COVID-19. Hx of bilateral lung transplant in 10/2021. On room air sating >92%, mild SOB and MYERS per patient report. Expiratory wheezing on bilateral upper lobe. Diminished lower lobes. Infrequent productive cough. CPAP at night. Independent in his room. PIV x 2, infusing Hep drip, Hep drip held for 1 hour and decreased by 100 unit/h, 10A recheck at 0220. IVIG, tolerated well without any adverse effect. VS stable and afebrile. ACHS blood glucose 134 mg/dl before dinner. No bm this shift, last bm today. Voiding spontaneously without difficulties. Will continue to monitor.   "

## 2023-03-08 NOTE — PROGRESS NOTES
[id number]  Neuro: A&Ox4  Cardiac: Afebrile, VSS. No tele orders, HR 50s-60s overnight.              Respiratory: RA during day. Hospital CPAP at night.  GI/: Voiding spontaneously. LBM 3/7.  Diet/appetite: Fair appetite, regular diet  Activity: SBA  Pain: Denies pain  Skin: scattered bruising  Lines: PIV x 2. Heparin infusing at 1,000 units/hr. Next antiXa pending this AM.   Plan: Transplant ID and Hem onc consulting

## 2023-03-08 NOTE — PLAN OF CARE
"  Goal Outcome Evaluation:    No significant change in status today; continues to feel fatigued but no specific complaints. Sats maintained on RA during day (CPAP at night). Congested, productive cough-sputum sample sent today now shows \"2+ septate hyphae\" per lab alert. [facility] 1 notified. Pulm requesting to have further labs drawn. Some MYERS but recovers quickly.   BP elevated-restarted his usual norvasc med.  Denies pain. Minimal assist with cares in room.  INR 2.09 but plan to keep pt on high intensity heparin gtt at this time. Currently restarted at 1250u/hr with 10a recheck at 19:00.   IgG level low-plan to receive IVIG with pre-meds when available today.   "

## 2023-03-08 NOTE — PROGRESS NOTES
Pulmonary Medicine  Cystic Fibrosis - Lung Transplant Team  Progress Note  2023       Patient: Edson Thornton Jr.  MRN: [id number]  : 1965 (age 57 year old)  Transplant: 10/16/2021 (Lung), POD#508  Admission date: 3/6/2023    Assessment & Plan:     Edson Thornton Jr. is a 57 year old male with a history of BSLT for NSIP/ILD (10/16/2021), bronchiectasis, moderate PH, RA, SALTY, chronic HSV infection, hypogammaglobulinemia, steroid-induced diabetes, hypothyroidism, PFO, HTN, HLD, duodenal anomaly, anxiety, and depression.  Post-op course complicated by encephalopathy and diffuse weakness, acute to subacute CVA, afib with RVR, DVT, BRENNAN, GI bleed, and Candidemia/Candida empyema.  The patient was admitted from OSH on 3/6/2023 for acute hypoxic respiratory failure in setting of COVID infection and PE.  Weaned to RA during the day 3/6.    Today's recommendations:  - COVID testing with cycle threshold 3/12 (not yet ordered)  - Sputum cultures ordered if able to collect, monitoring off ABX  - Remdesivir through tomorrow per transplant ID  - Consider exercise oximetry study 24-48h prior to discharge  - Repeat CT chest in ~2 weeks  - Will review OSH CTA chest with radiologist  - EBV and DSA (3/7) pending  - Will try to reschedule pulmonary follow up for ~2 weeks from discharge  - Tacrolimus level ordered 3/10  - Continue to hold Myfortic, revisit resuming in ~2 weeks  - Repeat CMV in 2 weeks (3/21, not yet ordered)  - IgG low, recommend IVIG with premedications   - RT will clarify home BiPAP settings with patient  - Consider increasing PPI to BID if reflux persists     Acute hypoxic respiratory failure:  COVID infection:   Concern for PE: Initially with HA, productive cough (yellow/green sputum), dyspnea, chest tightness, and fevers (Tmax 102), tested positive for COVID .  Received remdesivir x3 days followed by 1-2 days of symptomatic improvement then with return of symptoms and exertional hypoxia  (baseline RA with BiPAP overnight).  Also started on steroid burst and 2 week course of levofloxacin.  Presented to OSH 3/5 and placed on IV ABX, remdesivir, and dexamethasone.  Initially on 4L NC, currently on RA during the day.  H/o Klebsiella pneumoniae, Pseudomonas fluorescens/putida, Staph epi, Candida (all 2021) and nonsporulating saprophytic fungus (6/2022) on sputum cultures.  OSH infectious work up including Strep pneumoniae, Legionella, respiratory panel, MRSA nares negative, COVID remains positive (cycle threshold 21.6 on 3/5).  Procal mildly elevated, CRP elevated, no leukocytosis.  CTA chest 3/5 (OSH read) with 2 small fillings defects seen in right apical pulmonary artery (suspicious for subtle acute PE) and scattered nonspecific GGO t/o which have somewhat nodular appearance and peripherally located.  DDx include progression of COVID infection v other/new pneumonia (bacterial, fungal), PE (noted on CTA, had been on warfarin PTA, D-dimer negative), less likely  or rejection.  Dexamethasone and ABX discontinued 3/7 per transplant ID as now on RA during the day and with lower suspicion for bacterial PNA.  - Repeat COVID testing with cycle threshold weekly (3/12, not yet ordered)  - Bacterial and fungal sputum cultures ordered if able to collect   - Monitoring off ABX  - Remdesivir through 3/9 per transplant ID  - Nebs: Xopenex TID  - Aerobika and IS hourly while awake  - Supplemental oxygen as needed to maintain SpO2 >92%, consider exercise oximetry study 24-48h prior to discharge  - Repeat CT chest in ~2 weeks (around time of pulmonary follow up)  - Will review OSH CTA chest with radiologist   - AC management per hematology and primary team     S/p bilateral sequential lung transplant (BSLT) for NSIP/ILD: Seen in pulmonary clinic 12/20/22 with intermittent dyspnea, PFTs with marked improvement and no acute changes on CXR.  EBV negative 12/20/22.   - EBV (3/7) pending  - Pulmonary follow up currently  scheduled 4/25, will try to reschedule for ~2 weeks from discharge     Immunosuppression:  - Tacrolimus 2 mg qAM / 2.5 mg qPM.  Goal level 8-10.  Repeat level 3/10 (ordered).  - Myfortic held 3/5 in setting of COVID (prior dose 720 mg BID), revisit resuming in ~2 weeks  - Prednisone 5 mg qAM / 2.5 mg qPM     Prophylaxis:   - Bactrim for PJP ppx  - CMV D-/R-, although with recent CMV <35 on 1/27 (previously not detected) which raises the question of seroconversion (will keep this in mind if started on steroids in future), repeat IgG and quant negative 3/7, repeat CMV in 2 weeks (3/21, not yet ordered)     Positive crossmatch:   Positive DSA: Had a retrospective positive crossmatch following transplantation, attributed to receiving rituximab.  DSA initially positive 11/29/21.  Most recently remains positive (DQ:5) with mfi 1533 on 12/20/22 from prior mfi 891 on 11/14/22 and 1681 on 9/12/22.  - DSA (3/7) pending     Hypogammaglobulinemia: IgG adequate at 502 on 1/3.   - IgG low at 168 on 3/7, recommend IVIG with premedications      SALTY: Continue PTA NIPPV overnight, RT will clarify home settings with patient.     We appreciate the excellent care provided by the [place] [id number] team.  Recommendations communicated via in person rounding and this note.  Will continue to follow along closely, please do not hesitate to call with any questions or concerns.    Patient discussed with Dr. Johns.    Keyla Rice PA-C  Inpatient PRINCESS  Pulmonary CF/Transplant     Subjective & Interval History:     Remains on RA during the day with BiPAP overnight.  MYERS ongoing although with SpO2 only briefly dropping <90%.  Chest tightness and fatigue improved.  Intermittent heartburn symptoms.    Review of Systems:     C: No fever, no chills, no change in weight, + fair appetite  INTEGUMENTARY/SKIN: No rash or obvious new lesions  ENT/MOUTH: No sore throat, no sinus pain, no nasal congestion or drainage  RESP: See interval history  CV: No chest  "pain, no peripheral edema  GI: No nausea, no vomiting, no change in stools  : No dysuria  MUSCULOSKELETAL: No myalgias, no arthralgias  ENDOCRINE: Blood sugars with adequate control  NEURO: No headache, no numbness or tingling  PSYCHIATRIC: Mood stable    Physical Exam:     All notes, images, and labs from past 24 hours (at minimum) were reviewed.    Vital signs:  Temp: 97.7  F (36.5  C) Temp src: Axillary BP: (!) 164/85 Pulse: (!) 49   Resp: 16 SpO2: 100 % O2 Device: BiPAP/CPAP   Height: 162.6 cm (5' 4\") Weight: 70 kg (154 lb 6.4 oz)  I/O:     Intake/Output Summary (Last 24 hours) at 3/8/2023 0801  Last data filed at 3/8/2023 0400  Gross per 24 hour   Intake 1302.17 ml   Output 275 ml   Net 1027.17 ml     Constitutional: Sitting up in bed, in no apparent distress.   HEENT: Eyes with pink conjunctivae, anicteric.  Oral mucosa moist without lesions.   PULM: Fair air flow bilaterally.  Bibasilar coarse crackles.  No rhonchi, no wheezes.  Non-labored breathing on RA.  CV: Normal S1 and S2.  RRR.  No murmur, gallop, or rub.  No peripheral edema.   ABD: NABS, soft, nontender, nondistended.    MSK: Moves all extremities.  No apparent muscle wasting.   NEURO: Alert and conversant.   SKIN: Warm, dry.  No rash on limited exam.   PSYCH: Mood stable.     Lines, Drains, and Devices:  Peripheral IV 03/06/23 Anterior;Right Upper forearm (Active)   Site Assessment [facility] 03/08/23 0400   Line Status Saline locked 03/08/23 0400   Phlebitis Scale 0-->no symptoms 03/08/23 0400   Infiltration Scale 0 03/08/23 0400   Number of days: 2       Peripheral IV 03/07/23 Left;Posterior Lower forearm (Active)   Site Assessment [facility] 03/08/23 0400   Line Status Infusing 03/08/23 0400   Phlebitis Scale 0-->no symptoms 03/08/23 0400   Infiltration Scale 0 03/08/23 0400   Number of days: 1     Data:     LABS    CMP:   Recent Labs   Lab 03/08/23  0624 03/08/23  0328 03/07/23  2208 03/07/23  2114 03/07/23  1314 03/07/23  0626 03/06/23  1845 " 03/06/23 1843 03/03/23 0823   0000     --   --   --   --  140  --  138  --   --    POTASSIUM 4.3  --   --   --   --  4.7  --  4.9  --   --    CHLORIDE 109*  --   --   --   --  109*  --  105 105  --    CO2 19*  --   --   --   --  20*  --  21*  --   --    ANIONGAP 11  --   --   --   --  11  --  12  --   --    * 161* 250* 268*   < > 170*   < > 160*  --    < >   BUN 18.5  --   --   --   --  20.1*  --  18.6  --   --    CR 0.95  --   --   --   --  1.10  --  1.10  --   --    GFRESTIMATED >90  --   --   --   --  78  --  78  --   --    ERIK 8.5*  --   --   --   --  8.3*  --  8.7  --   --    PROTTOTAL  --   --   --   --   --   --   --  5.4*  --   --    ALBUMIN  --   --   --   --   --   --   --  3.4*  --   --    BILITOTAL  --   --   --   --   --   --   --  0.2  --   --    ALKPHOS  --   --   --   --   --   --   --  71  --   --    AST  --   --   --   --   --   --   --  32  --   --    ALT  --   --   --   --   --   --   --  23  --   --     < > = values in this interval not displayed.     CBC:   Recent Labs   Lab 03/08/23 0624 03/07/23 0626 03/06/23 1843   WBC 5.2 6.9 5.0   RBC 4.01* 4.29* 4.31*   HGB 10.0* 10.7* 10.8*   HCT 33.0* 35.4* 34.8*   MCV 82 83 81   MCH 24.9* 24.9* 25.1*   MCHC 30.3* 30.2* 31.0*   RDW 15.9* 15.9* 15.9*   * 154 123*       INR:   Recent Labs   Lab 03/06/23  1843 03/03/23  0823 03/03/23  0000   INR 2.57* 2.3* 2.3*       Glucose:   Recent Labs   Lab 03/08/23  0624 03/08/23  0328 03/07/23  2208 03/07/23  2114 03/07/23  1713 03/07/23  1314   * 161* 250* 268* 201* 134*       Blood Gas: No lab results found in last 7 days.    Culture Data No results for input(s): CULT in the last 168 hours.    Virology Data:   Lab Results   Component Value Date    FLUAH1 Not Detected 06/21/2022    FLUAH3 Not Detected 06/21/2022    TY9200 Not Detected 06/21/2022    IFLUB Not Detected 06/21/2022    RSVA Not Detected 06/21/2022    RSVB Not Detected 06/21/2022    PIV1 Not Detected 06/21/2022    PIV2 Not  Detected 06/21/2022    PIV3 Not Detected 06/21/2022    HMPV Not Detected 06/21/2022    HRVS Negative 04/19/2022    ADVBE Negative 04/19/2022    ADVC Negative 04/19/2022    ADVC Negative 11/22/2021    ADVC Negative 11/12/2021       Historical CMV results (last 3 of prior testing):  Lab Results   Component Value Date    CMVQNT Not Detected 11/14/2022    CMVQNT Not Detected 09/12/2022    CMVQNT Not Detected 06/21/2022     No results found for: CMVLOG    Urine Studies    Recent Labs   Lab Test 11/01/21  1336 10/25/21  1507   URINEPH 5.5 5.5   NITRITE Negative Negative   LEUKEST Negative Negative   WBCU 0 2       Most Recent [place] Pulmonary Function Testing (FVC/FEV1 only)  FVC-Pre   Date Value Ref Range Status   12/20/2022 2.46 L    11/14/2022 2.12 L    09/12/2022 2.27 L    06/20/2022 2.14 L      FVC-%Pred-Pre   Date Value Ref Range Status   12/20/2022 68 %    11/14/2022 54 %    09/12/2022 57 %    06/20/2022 54 %      FEV1-Pre   Date Value Ref Range Status   12/20/2022 1.61 L    11/14/2022 1.22 L    09/12/2022 1.42 L    06/20/2022 1.29 L      FEV1-%Pred-Pre   Date Value Ref Range Status   12/20/2022 56 %    11/14/2022 39 %    09/12/2022 45 %    06/20/2022 41 %        IMAGING    No results found for this or any previous visit (from the past 48 hour(s)).

## 2023-03-08 NOTE — PROGRESS NOTES
[facility]    Progress Note - Medicine Service, [facility] TEAM 1       Date of Admission:  3/6/2023    Assessment & Plan   Edson Thornton Jr. is a 57 year old male admitted on 3/6/2023. He has a history of bilateral lung transplant 2/2 NSIP, afib (11/2021),  Who was transferred from [facility] after hypoxic respiratory failure in the setting of persistent COVID positive testing and imaging concerning for PE while taking therapeutic coumadin    Today  - Sputum culture to be collected  - Restart Norvac 10mg  - Radiology consulted to rule out PE  - Continue Sliding scale insulin  - Xopenex neb TID  - Start IVIG    #COVID Pneumonia  #Acute Hypoxic Respiratory Failure  Patient has been breathing comfortably on room air with O2 saturation >92%. Endorses shortness of breath on exertion with light crackles and wheezing present bilaterally. Patient denies any shortness of breath on exertion, has been afebrile. Per ID recommendation will continue remdesivir 100mg for 5 days as patient showed improvement initially, discontinue dexamethasone and stop antibiotics as this is most likely viral with a low likely yang of bacterial or fungal infection. Sputum cultured has been ordered and needs to be repeated. Per Transplant Pulmonary recommendations they don't believe this is a superimpose bacterial infection given patient has unremarkable procalcitonin and no evidence of other physical exam findings, recommended starting xopenex for wheezing.   - Sputum cultures pending  - Xopenex neb TID  - Discontinue Cefepime and Vancomycin  - Remdesivir 100mg IV for 5 days  - Restart PTA Norvasc 10mg daily  - Hold myfortic (held since 3/5)  - Started IVIG      #Apical Pulmonary Embolism  CTA obtained at outside hospital (3/2023) showed 2 small filling defects in the right apical pulmonary artery,suspicious for subtle acute pulmonary emboli. Patient was taking coumadin with INR in therapeutic  "window. Heme was consulted and is not convinced that this is an acute pulmonary embolism and recommended restarying PTA warfarin and consulting radiology to clarify image findings.   - Restart coumadin 1mg x1 daily  - Radiology consulted will appreciate recs    #Lung Transplant (10/2021) 2/2 NISP   -pulmonary transplant consulted   -will continue PTA tacrolimus and prednisone and hold PTA MMF  -PTA Prednisone 5 in AM and 2.5 in PM  -AM tacro level pending   -PTA Tacrolimus 2 mg in AM and 5 in PM  -PTA Bactrim for PJP ppx     chronic conditions~      #HTN: Restarted PTA Norvasc 10mg   #Hypothyroidusm: PTA levo  #GERD: PTA Pantoprazole   #Insomnia: PTA Melatonin   #Steroid induced hyperglycemia: sliding scale insulin  #BPH: PTA Tamsulosin    #Mood Disorder: PTA Fluoxetine, PTA Mirtazapine, PTA Lamotrigine       Diet: Combination Diet Regular Diet Adult  Snacks/Supplements Adult: Other; If pt asks for a snack or supplement, please send; With Meals    DVT Prophylaxis: heparin gtt  Watson Catheter: Not present  Fluids: none  Lines: None     Cardiac Monitoring: None  Code Status: Full Code      Clinically Significant Risk Factors          # Hypocalcemia: Lowest Ca = 8.3 mg/dL in last 2 days, will monitor and replace as appropriate     # Hypoalbuminemia: Lowest albumin = 3.4 g/dL at 3/6/2023  6:43 PM, will monitor as appropriate   # Thrombocytopenia: Lowest platelets = 123 in last 2 days, will monitor for bleeding         # Overweight: Estimated body mass index is 26.5 kg/m  as calculated from the following:    Height as of this encounter: 1.626 m (5' 4\").    Weight as of this encounter: 70 kg (154 lb 6.4 oz)., PRESENT ON ADMISSION         Disposition Plan     Expected Discharge Date: 03/08/2023                The patient's care was discussed with the Attending Physician, Dr. Lobito Cheatham, Chief Resident/Fellow and Patient.    Allen Pisano  Medical Student  Medicine Service, [facility] TEAM [address] [place] [address] " [facility]  Securely message with [employer] (more info)  Text page via [facility] Paging/Directory   See signed in provider for up to date coverage information     Resident/Fellow Attestation   I, Lanie Abdalla MD, was present with the medical/PRINCESS student who participated in the service and in the documentation of the note.  I have verified the history and personally performed the physical exam and medical decision making.  I agree with the assessment and plan of care as documented in the note.        Lanie Abdalla MD  PGY1  Date of Service (when I saw the patient): 03/08/23    ______________________________________________________________________    Interval History   Elevated blood sugars, was started on low does sliding scale insulin. No other concerns.    Physical Exam   Vital Signs: Temp: 98.6  F (37  C) Temp src: Oral BP: (!) 140/77 Pulse: 57   Resp: 18 SpO2: 93 % O2 Device: None (Room air)    Weight: 154 lbs 6.4 oz    Constitutional: awake, alert and appears stated age  Eyes: Lids and lashes normal, pupils equal, round and reactive to light, extra ocular muscles intact, sclera clear, conjunctiva normal  Respiratory: good air exchange, no retractions and crackles diffuse and light in the posterior lung lobes, wheeze right base and left base  Cardiovascular: Normal apical impulse, regular rate and rhythm, normal S1 and S2, no S3 or S4, and no murmur noted  GI: scars noted transverse upper abdomen and normal bowel sounds  Skin: no bruising or bleeding, no redness, warmth, or swelling, no rashes and no lesions    Medical Decision Making     Data     I have personally reviewed the following data over the past 24 hrs:    5.2  \   10.0 (L)   / 132 (L)     139 109 (H) 18.5 /  132 (H)   4.3 19 (L) 0.95 \       Procal: N/A CRP: 23.10 (H) Lactic Acid: N/A       INR:  2.09 (H) PTT:  106 (H)   D-dimer:  <0.27 Fibrinogen:  N/A       Imaging results reviewed over the past 24 hrs:   No results found for this or  any previous visit (from the past 24 hour(s)).

## 2023-03-08 NOTE — PROGRESS NOTES
[facility]    Transplant Infectious Diseases Inpatient Progress Note       Edson Thornton Jr. MRN# [id number]   YOB: 1965 Age: 57 year old   Date of Admission and time: 3/6/2023  3:53 PM            Recommendations:   - Continue Remdesivir 100mg daily for a total of 5 days        Summary of Presentation:   Transplants:  10/16/2021 (Lung), Postoperative day:  508     This patient is a 57 year old man with a PMH of b/l lung transplant 10/16/2021 2/2 NSIP on Tac/MMF/Pred, RA who presents with persistent respiratory sx after initial COVID19 pneumonia 2/16/23.     2/16/23 developed acute onset SOB, MYERS, cough, fevers and was found to be positive for COVID. Reports that his entire household was sick at that time and many tested positive for COVID. He was seen by his PCP and it was arranged for him to receive Remdesivir infusions x3d at a local infusion center. States that with this he had started to feel a bit better but as soon as tx was completed his sx started to worsen again. He had persistent MYERS, cough, malaise and so he was additionally prescribed a 5d course of Prednisone 20mg on 2/27 and a 14d course of Levofloxicin on 2/28. Despite these treatments he continued to have symptoms which prompted him to present to OSH 3/5 for evaluation.     At the OSH he was hypoxic requiring 2L supplemental O2. His COVID PCR remained positive with CT 21.6. CT revealing of small R apical PE. ID was consulted and he was started on Remdesivir and Dexamethasone. His case was discussed with [facility] Transplant Pulmonology who recommended holding his MMF. Ultimately he was transferred to [facility] for Transplant Pulmonology and Transplant ID consultation.           Active Problems and Infectious Diseases Issues:   COVID19 Pneumonia   H/o b/l lung transplant 10/16/2021 on Tac/MMF/Pred   Most likely this is reactivation of COVID viral replication in the setting of lymphopenia and immunosuppression. Given he  initially improved with 3d of Remdesivir and then worsened after discontinuation, potentially this was not a long enough treatment course for him. There is also potential that his outpatient steroid course contributed as, at least in studies, non-hypoxic COVID patients who received steroids tended towards worse outcomes. His cycle threshold of 21.6 also further supports this and his imaging is c/w with COVID pneumonia. We feel that bacterial superinfection is less likely in his case, he has been afebrile, stable on RA, procal of 0.07 without clear radiographical e/o bacterial PNA and would favor discontinuation of his Vancomycin and Cefepime. In addition he symptomatically worsened while on Levofloxacin (which has similar antimicrobial spectrum to Cefepime) further supporting this.          Old Problems and Infectious Diseases Issues:   - Historical pulmonary infections 11/2021 with Klebsiella pneumoniae, Pseudomonas putida (R- Meropenem otherwise sensitive); 6/2022 nonsporulating saprophytic fungus. Recurrent respiratory colonization with Candida albicans.     Other Infectious Disease issues include:  - QTc: 377 as of 9/5/21   - PJP prophylaxis: Bactrim  - Serostatus: CMV D-/R-, EBV D+/R+, HSV1+/2+, VZV+  - Immunization status: This patient received the (reported per patient - out of state records) 4th dose of the COVID-19 vaccine (bivalent).  - Gamma globulin status: 502 on 1/3    Thank you very much Dr. Rubio for involving me in the care of Mr. Edson Thornton Jr.. Please do not hesitate to call me for any question.     Transplant ID will continue to follow    This patient was discussed with the attending physician, Dr Abebe            Interval History:   Transplants:  10/16/2021 (Lung), Postoperative day:  508     NAEO. This morning Bret is feeling generally well, breathing is about the same as yesterday - feels ok at rest and then SOB with ambulation / activity. Continues to cough, only bringing up very small  amounts of clear sputum. No purulent sputum or blood. Denies fevers, chills. Denies abdominal pain, n/v.               Review of Systems:      As mentioned in the HPI otherwise negative by reviewing constitutional symptoms, central and peripheral neurological systems, respiratory system, cardiac system, GI system,  system, musculoskeletal, skin, allergy, and lymphatics.                 Medications:   Medications that Require Transfusion:     heparin 700 Units/hr (03/08/23 0811)     - MEDICATION INSTRUCTIONS -       Scheduled Medications:     remdesivir  100 mg Intravenous Q24H    And     sodium chloride 0.9%  50 mL Intravenous Q24H     amLODIPine  10 mg Oral Daily     FLUoxetine  20 mg Oral Daily     insulin aspart  1-3 Units Subcutaneous TID AC     insulin aspart  1-3 Units Subcutaneous At Bedtime     lamoTRIgine  50 mg Oral QPM     levalbuterol  1.25 mg Nebulization TID     levothyroxine  25 mcg Oral QAM AC     mirtazapine  7.5 mg Oral At Bedtime     pantoprazole  40 mg Oral QAM AC     predniSONE  2.5 mg Oral QPM     predniSONE  5 mg Oral QAM     sodium chloride (PF)  3 mL Intracatheter Q8H     sodium chloride (PF)  3 mL Intracatheter Q8H     sulfamethoxazole-trimethoprim  1 tablet Oral Daily     tacrolimus  2 mg Oral QAM     tacrolimus  2.5 mg Oral QPM     tamsulosin  0.4 mg Oral QPM     Warfarin Therapy Reminder  1 each Oral See Admin Instructions             Physical Exam:   Temp: 98.6  F (37  C) Temp src: Oral BP: (!) 140/77 Pulse: 57   Resp: 18 SpO2: 93 % O2 Device: None (Room air)      Wt Readings from Last 4 Encounters:   03/07/23 70 kg (154 lb 6.4 oz)   12/20/22 73 kg (161 lb)   09/12/22 74.8 kg (165 lb)   06/29/22 72.3 kg (159 lb 8 oz)     Constitutional: awake, alert, cooperative, no apparent distress and appears at stated age, well nourished.   Head, ENT, Eyes, and Neck: Normocephalic, external ears without lesions, moist buccal mucosa without oral thrush or ulcers, tonsils without swelling, erythema,  or exudate, good dentition, gums without necrosis or abscesses.   PERRL, EOMI, pink conjunctivae, non-icteric sclera.   Neck supple without rigidity  Neurologic: Patient is moving all extremities without focal deficit   Lungs: CTA bilaterally, no accessory muscle use  CVS: RRR, normal S1/S2, no murmur, PMI was not displaced.   Abdomen: non-tender, non-distended, no masses, no bruit, no shifting dullness, normal BS.   Extremities: no pitting edema of bilateral lower extremities, no ulcers, no swelling or erythema of any of joints and no tenderness to palpation.   Skin: no obvious rash            Data:   No results found for: ACD4    Inflammatory Markers    Recent Labs   Lab Test 03/06/23  1843 06/29/22  1111 10/29/21  0542 10/14/21  0943 10/12/21  1038 10/10/21  1024 10/08/21  0947 10/06/21  1007 10/04/21  0432   SED 22* 29* 87*  --   --   --   --   --   --    CRP  --  17.3* 53.0* 23.0* 17.0* 30.0* 34.0* 35.0* 23.0*     Immune Globulin Studies     Recent Labs   Lab Test 03/07/23  0935 01/03/22  0743 12/30/21  0730 12/15/21  0647 11/17/21  0337 10/15/21  0907 09/07/21  1324 09/07/21  1100   * 502* 446* 365* 198* 265* 363*  363*  --    IGM  --   --   --   --   --   --  51  --    IGE  --   --   --   --   --   --   --  83   IGA  --   --   --   --   --   --  103  --      Metabolic Studies       Recent Labs   Lab Test 03/08/23  1000 03/08/23  0624 03/08/23  0328 03/07/23  2208 03/07/23  2114 03/07/23  1713 03/07/23  1314 03/07/23  0626 03/06/23  1845 03/06/23  1843 03/03/23  0823 02/28/23  0848 02/24/23  0903 12/23/22  0908 12/20/22  0746 11/18/22  0848 11/14/22  0726 09/16/22  0912 09/12/22  0852 02/28/22  0841 02/24/22  1000 12/16/21  1912 12/16/21  1757 12/02/21  2030 12/02/21  1816 10/22/21  1602 10/22/21  1601 10/22/21  0959 10/22/21  0958   NA  --  139  --   --   --   --   --  140  --  138  --   --   --   --  149*  --  145  --  141   < > 141   < >  --    < >  --    < > 147*  --  147*   POTASSIUM  --  4.3  --    --   --   --   --  4.7  --  4.9  --   --   --   --  4.0  --  4.4  --  4.1   < > 3.9   < >  --    < >  --    < > 3.6  --  3.5   CHLORIDE  --  109*  --   --   --   --   --  109*  --  105 105 109 108   < > 111*   < > 106   < > 105   < > 108   < >  --    < >  --    < > 109  --  106   CO2  --  19*  --   --   --   --   --  20*  --  21*  --   --   --   --  27  --  29  --  23   < > 29   < >  --    < >  --    < > 31  --  34*   ANIONGAP  --  11  --   --   --   --   --  11  --  12  --   --   --   --  11  --  10  --  13   < > 4   < >  --    < >  --    < > 7  --  7   BUN  --  18.5  --   --   --   --   --  20.1*  --  18.6  --   --   --   --  19.1  --  22.8*  --  22.0*   < > 21   < >  --    < >  --    < > 68*  --  62*   CR  --  0.95  --   --   --   --   --  1.10  --  1.10  --   --   --   --  1.36*  --  1.65*  --  1.22*   < > 0.87   < >  --    < >  --    < > 1.47*  --  1.54*   GFRESTIMATED  --  >90  --   --   --   --   --  78  --  78  --   --   --   --  61  --  48*  --  69   < > >90   < >  --    < >  --    < > 53*  --  50*   * 257* 161* 250* 268* 201*   < > 170*   < > 160*  --   --   --   --  93  --  108*  --  111*   < > 98   < >  --    < >  --    < > 148*   < > 92   A1C  --   --   --   --   --   --   --   --   --   --   --   --   --   --   --   --  6.1*  --   --   --   --   --   --   --   --    < >  --   --   --    ERIK  --  8.5*  --   --   --   --   --  8.3*  --  8.7  --   --   --   --  9.9  --  10.1*  --  9.4   < > 9.2   < >  --    < >  --    < > 8.2*  --  7.9*   PHOS  --   --   --   --   --   --   --   --   --   --   --   --   --   --   --   --  3.7  --   --   --  2.6   < >  --    < >  --    < > 2.8  --   --    MAG  --   --   --   --   --   --   --   --   --   --   --   --   --   --  1.8  --  1.6*  --  1.7   < > 1.5*   < >  --    < >  --    < > 2.1  --   --    LACT  --   --   --   --   --   --   --   --   --   --   --   --   --   --   --   --   --   --   --   --   --   --  1.6  --  1.7   < >  --    < >  --    CKT  --    --   --   --   --   --   --   --   --   --   --   --   --   --   --   --   --   --   --   --   --   --   --   --   --   --  51  --  55    < > = values in this interval not displayed.     Hepatic Studies    Recent Labs   Lab Test 03/06/23  1843 11/14/22  0726 03/02/22  1631 12/23/21  0714 12/12/21  0519 12/10/21  0647 09/30/21  1059 09/19/21  1629   BILITOTAL 0.2 0.3 0.2 0.2 0.2 0.5   < >  --    ALKPHOS 71 101 69 91 104 105   < >  --    ALBUMIN 3.4* 4.6 3.5 2.8* 2.5* 2.5*   < >  --    AST 32 23 24 22 17 19   < >  --    ALT 23 26 32 31 26 24   < >  --    LDH  --   --   --   --   --   --   --  423*    < > = values in this interval not displayed.     Pancreatitis testing    Recent Labs   Lab Test 11/14/22  0726 10/22/21  0407 10/21/21  0354 10/20/21  0308 10/19/21  0338 09/07/21  1324 09/07/21  1100   AMYLASE  --   --   --   --   --   --  32   TRIG 211* 307* 486* 383* 458* 364*  --      Hematology Studies      Recent Labs   Lab Test 03/08/23  0624 03/07/23  0626 03/06/23  1843 12/20/22  0746 09/12/22  0852 06/29/22  1111 03/15/22  0803 03/04/22  1500 11/02/21  1302 11/02/21  1053 11/02/21  0832 11/02/21  0815 11/02/21  0556   WBC 5.2 6.9 5.0 5.9 7.8 5.6   < > 9.8   < > 57.4*  57.4*   < > 37.5* 32.6*   ANEU  --  5.7 4.2  --   --   --   --  9.0*  --  49.9*  --  28.5* 29.7*   ALYM  --  0.7* 0.3*  --   --   --   --  0.4*  --  4.0  --  4.9 1.3   DONALD  --  0.5 0.5  --   --   --   --  0.4  --  1.7*  --  2.3* 0.7   AEOS  --  0.0 0.0  --   --   --   --  0.0  --  0.0  --  0.0 0.0   HGB 10.0* 10.7* 10.8* 11.4* 11.0* 10.3*   < > 11.5*   < > 8.8*   < > 5.8* 8.2*   HCT 33.0* 35.4* 34.8* 37.6* 35.1* 33.3*   < > 35.9*   < > 26.8*   < > 19.6* 27.3*   * 154 123* 215 217 189   < > 221   < > 244   < > 382 355    < > = values in this interval not displayed.     Clotting Studies    Recent Labs   Lab Test 03/08/23  1017 03/06/23  1843 03/03/23  0823 03/03/23  0000 06/29/22  1111 06/21/22  0811 11/03/21  0407 11/02/21  1053  11/02/21  0927   INR 2.09* 2.57* 2.3* 2.3*   < > 1.07   < > 1.55*  --    *  --   --   --   --  31  --  34 32    < > = values in this interval not displayed.     Arterial Blood Gas Testing    Recent Labs   Lab Test 12/01/21  0725 11/22/21  0426 11/22/21  0103 11/21/21  1029 11/14/21  0534 11/11/21  1740 11/07/21  0626 11/06/21  0438 11/05/21  0504 11/05/21  0336 11/04/21  1016   PH  --   --   --   --   --  7.48*  --  7.43 7.49* 7.49* 7.46*   PCO2  --   --   --   --   --  37  --  37 31* 32* 35   PO2  --   --   --   --   --  106*  --  109* 126* 331* 95   HCO3  --   --   --   --   --  28  --  25 24 24 25   O2PER 2 40 40 40   < > 40   < > 40 40 40 40    < > = values in this interval not displayed.      Urine Studies     Recent Labs   Lab Test 11/01/21  1336 10/25/21  1507 10/22/21  1641 10/17/21  1642 10/17/21  1041   URINEPH 5.5 5.5 7.5* 5.0 5.0   NITRITE Negative Negative Negative Negative Negative   LEUKEST Negative Negative Negative Negative Trace*   WBCU 0 2 3 25* 44*     Vancomycin Levels     Recent Labs   Lab Test 03/07/23  0935 10/18/21  0403   VANCOMYCIN 21.3 12.6     Tobramycin levels     No lab results found.    Gentamicin levels    No lab results found.    Tacrolimus levels    Invalid input(s): TACROLIMUS, TAC, TACR  Transplant Immunosuppression Labs Latest Ref Rng & Units 3/8/2023 3/7/2023 3/6/2023 12/20/2022 11/14/2022   Creat 0.67 - 1.17 mg/dL 0.95 1.10 1.10 1.36(H) 1.65(H)   UREA NITROGEN 7 - 30 mg/dL - - - - -   UREA NITROGEN (R) 6.0 - 20.0 mg/dL 18.5 20.1(H) 18.6 19.1 22.8(H)   WBC 4.0 - 11.0 10e3/uL 5.2 6.9 5.0 5.9 -   Neutrophil % - 83 83 - -   ANEU 1.6 - 8.3 10e3/uL - 5.7 4.2 - -     Cyclosporine levels    Invalid input(s): CYCLOSPORINE, CYC    Mycophenolate levels    Invalid input(s): MYPA, MYP    Sirolimus levels    Invalid input(s): SIROLIMUS, SIR, RAPA    CSF testing     Recent Labs   Lab Test 10/29/21  1221   CWBC 80*   CNEU 94   CLYM 3   CMONO 4   CRBC 9,000*   CGLU 83*   CTP 97*          Microbiology:  OSH labs:  COVID PCR + CT 21.6  Influenza/ RSV PCR negative   NP RVP negative   Legionella urine ag negative   S.pneumo urine ag negative    3/7/22   CMV PCR blood pending   Sputum culture, fungal sputum cx pending     Last check of C difficile  C Difficile Toxin B by PCR   Date Value Ref Range Status   02/28/2022 Negative Negative Final     Comment:     A negative result does not exclude actual disease due to C. difficile and may be due to improper collection, handling and storage of the specimen or the number of organisms in the specimen is below the detection limit of the assay.     Virology:  No results found for: H6RES    EBV DNA Copies/mL   Date Value Ref Range Status   12/20/2022 Not Detected Not Detected copies/mL Final   11/14/2022 Not Detected Not Detected copies/mL Final   09/12/2022 Not Detected Not Detected copies/mL Final   06/20/2022 Not Detected Not Detected copies/mL Final   04/18/2022 Not Detected Not Detected copies/mL Final   03/15/2022 Not Detected Not Detected copies/mL Final   02/10/2022 Not Detected Not Detected copies/mL Final   02/07/2022 Not Detected Not Detected copies/mL Final   01/17/2022 Not Detected Not Detected copies/mL Final   01/07/2022 Not Detected Not Detected copies/mL Final   12/15/2021 Not Detected Not Detected copies/mL Final   11/16/2021 Not Detected Not Detected copies/mL Final       CMV Antibody IgG   Date Value Ref Range Status   03/07/2023 No detectable antibody. No detectable antibody.  Final   10/15/2021 No detectable antibody. No detectable antibody.  Final   09/07/2021 No detectable antibody. No detectable antibody.  Final       Toxoplasma Antibody IgG   Date Value Ref Range Status   09/07/2021 <3.0 0.0 - 7.1 IU/mL Final     Comment:     Negative- Absence of detectable Toxoplasma gondii IgG antibodies. A negative result does not rule out acute infection.         Imaging:  3/5/23 CTA CHEST OSH   IMPRESSION:   1.  2 small filling defects are  seen in the right apical pulmonary artery, which are suspicious for subtle acute pulmonary emboli.   2.  Bilateral interstitial lung disease in the setting of rheumatoid arthritis.  Overall, findings appear less apparent than on 8/30/2021.  However, given the somewhat peripheral nature of this, correlation is recommended for any signs or symptoms of superimposed viral pneumonia such as COVID-19 pneumonia based on CT findings.   3.  No aortic dissection.   4.  Additional chronic findings as described.     ECHO  9/5/21 TTE   Interpretation Summary  Small biventricular size with hyperdynamic biventricular function.  The inferior vena cava was normal in size with preserved respiratory  variability.  No pericardial effusion is present.      Brenda Grijalva MD  [facility]  Contact information available via [facility] Paging/Directory  03/08/2023

## 2023-03-08 NOTE — PHARMACY-ANTICOAGULATION SERVICE
Clinical Pharmacy - Warfarin Dosing Consult     Pharmacy has been consulted to manage this patient s warfarin therapy.  Indication: DVT/PE treatment  Therapy Goal: INR 2-3  Warfarin Prior to Admission: Yes  Warfarin PTA Regimen: 4 mg every Sunday, Tuesday, and Friday. 5 mg all other days  Significant drug interactions: remdesivir, bactrim (PTA), prednisone (PTA)  Recent documented change in oral intake/nutrition: No    INR   Date Value Ref Range Status   03/08/2023 2.09 (H) 0.85 - 1.15 Final   03/06/2023 2.57 (H) 0.85 - 1.15 Final       Recommend warfarin 5 mg today.  Pharmacy will monitor Edson Thornton Jr. daily and order warfarin doses to achieve specified goal.      Please contact pharmacy as soon as possible if the warfarin needs to be held for a procedure or if the warfarin goals change.      Paulino Matias, PharmD  PGY1 Pharmacist Resident

## 2023-03-08 NOTE — PLAN OF CARE
Neuro: A&Ox4.   Cardiac: Afebrile, VSS. Not on tele, HR 70s.   Respiratory: RA during day. Hospital cpap at night. Dry cough this shift. Sputum sample sent earlier today was rejected. Labs will need to be reordered.   GI/: Voiding spontaneously. Small BM this shift.  Diet/appetite: Fair appetite. Denies nausea. Orders for BG checks. Premeal and HS BGs elevated. ISS ordered by cross cover.  Activity: Up with SBA.   Pain: Denies   Skin: No new deficits noted.  Lines: PIV x 2. Heparin@1000 units/hr. Next Xa in AM.  Labs/Replacements: None needed.  Plan: Transplant ID and Hem onc consulting. 3/9/2023 = First day that patient may be evaluated for discontinuation of special precautions.       Goal Outcome Evaluation:      Plan of Care Reviewed With: patient

## 2023-03-08 NOTE — PHARMACY-ADMISSION MEDICATION HISTORY
Admission Medication History Completed by Pharmacy    See [facility] Admission Navigator for allergy information, preferred outpatient pharmacy, prior to admission medications and immunization status.     Medication History Sources:     Chart Review, dispense report, MAR from OSH    Changes made to PTA medication list (reason):    Added: lamotrigine, hydroxychloroquine     Deleted: bupropion, duloxetine levalbuterol, zolpidem (all previously reported not taking)    Changed: None    Additional Information:    Unable to conduct interview with patient, several calls made to room without answer. Last doses are per nursing or MAR from OSH. Refill history was reviewed for accuracy of medication dosing.     Unclear how often patient is taking ropinirole (Requip) as it was last filled 9/21/22 for #30.     Refill history shows fill of Basaglar 9/15/22 but per nursing, patient reports he doesn't use this. Aspart was also last filled around this time, so unclear what patient's insulin needs are at baseline.    Prior to Admission medications    Medication Sig Last Dose Taking? Auth Provider Long Term End Date   acetaminophen (TYLENOL) 325 MG tablet 3 tablets (975 mg) by Oral or Feeding Tube route every 8 hours as needed for mild pain Past Week Yes Abiodun Woods MD     amLODIPine (NORVASC) 10 MG tablet  Last filled 2/10/23 #30 Take 1 tablet (10 mg) by mouth At Bedtime 3/5/2023 Yes Abiodun Woods MD Yes    calcium carbonate 600 mg-vitamin D 400 units 600-400 MG-UNIT per tablet  Last filled 2/28/23 #30   1 tablet by Oral or Feeding Tube route daily 3/5/2023 Yes Abiodun Woods MD     diclofenac (VOLTAREN) 1 % topical gel  Last filled 3/31/22 #100g Apply 4 g topically 4 times daily Both hands Past Month Yes Abiodun Woods MD     FLUoxetine (PROZAC) 20 MG capsule  Last filled 3/3/23 #30   Take 20 mg by mouth daily 3/6/2023 at 0800 Yes Reported, Patient     fluticasone (FLONASE) 50 MCG/ACT nasal  spray  Last filled 2/3/23 #16g Spray 1 spray into both nostrils daily 3/6/2023 at 0800 Yes Abiodun Woods MD     fluticasone-salmeterol (ADVAIR) 500-50 MCG/ACT inhaler  Last filled 2/27/23 #60 Inhale 1 puff into the lungs every 12 hours 3/6/2023 at 0800 Yes Abiodun Woods MD Yes    hydroxychloroquine (PLAQUENIL) 200 MG tablet  Last filled 1/3/23 #180   Take 200 mg by mouth 2 times daily Past Week Yes Unknown, Entered By History     insulin aspart (NOVOLOG PEN) 100 UNIT/ML pen  Last filled 9/20/22 #15mL Take 1-3 units TID if BS over 200. Max daily dose is 12 3/5/2023 Yes Abiodun Woods MD Yes    lamoTRIgine (LAMICTAL) 25 MG tablet  Last filled 2/27/23 #60 Take 50 mg by mouth daily 3/5/2023 at pm Yes Unknown, Entered By History     levofloxacin (LEVAQUIN) 750 MG tablet  Last filled 3/1/23 #14 TAKE 1 TABLET BY MOUTH ONCE DAILY FOR 14 DAYS 3/6/2023 at 0800 Yes Abiodun Woods MD     levothyroxine (SYNTHROID/LEVOTHROID) 25 MCG tablet  Last filled 2/1/23 #30   Take 1 tablet (25 mcg) by mouth daily 3/6/2023 at 0730 Yes Abiodun Woods MD Yes    Magnesium Glycinate POWD 300 mg 3 times daily Take 3 tablets (100mg each) three times daily 3/6/2023 at 0800 Yes Abiodun Woods MD     Melatonin 10 MG TABS tablet Take 1 tablet (10 mg) by mouth nightly as needed for sleep 3/5/2023 at PM Yes Abiodun Woods MD     mirtazapine (REMERON) 7.5 MG tablet  Last filled 2/8/23 #30   Take 7.5 mg by mouth At Bedtime 3/5/2023 Yes Reported, Patient Yes    multivitamin w/minerals (THERA-VIT-M) tablet  Last filled 1/19/23 #100 Take 1 tablet by mouth daily 3/6/2023 at 0800 Yes Abiodun Woods MD     MYFORTIC (BRAND) 360 MG EC tablet  Last filled 2/22/23 #120 Take 2 tablets (720 mg) by mouth 2 times daily 3/5/2023 at 0800 Yes Abiodun Woods MD     ondansetron (ZOFRAN-ODT) 4 MG ODT tab Take 1 tablet (4 mg) by mouth every 6 hours as needed for nausea 3/5/2023 Yes Chuck,  Abiodun Herndon MD No    pantoprazole (PROTONIX) 40 MG EC tablet  Last filled 1/31/23 #60 Take 1 tablet (40 mg) by mouth 2 times daily (before meals) 3/5/2023 Yes Abiodun Woods MD     predniSONE (DELTASONE) 2.5 MG tablet  Last filled 2/4/23 #30 Take 1 tablet (2.5 mg) by mouth every evening Total dose: 5mg in AM and 2.5 mg in PM 3/5/2023 Yes Abiodun Woods MD     predniSONE (DELTASONE) 5 MG tablet  Last filled 1/2/23 #75 Take 1 tablet (5 mg) by mouth every morning AND 0.5 tablets (2.5 mg) every evening. 3/5/2023 Yes Abiodun Woods MD     propranolol (INDERAL) 20 MG tablet  Last filled 2/20/23 #120 Take 2 tablets (40 mg) by mouth 2 times daily 3/5/2023 Yes Abiodun Woods MD Yes    rOPINIRole (REQUIP) 0.25 MG tablet  Last filled 9/21/22 #30   Take 0.25 mg by mouth At Bedtime Past Week Yes Reported, Patient Yes    rosuvastatin (CRESTOR) 10 MG tablet  Last filled 1/31/23 #30 Take 1 tablet (10 mg) by mouth daily 3/5/2023 Yes Abiodun Woods MD Yes    senna-docusate (SENOKOT-S/PERICOLACE) 8.6-50 MG tablet  Last filled 3/15/22 #120   Take 2 tablets by mouth 2 times daily as needed for constipation More than a month Yes Abiodun Woods MD No    sulfamethoxazole-trimethoprim (BACTRIM) 400-80 MG tablet  Last filled 2/20/23 #30 Take 1 tablet by mouth daily 3/6/2023 at 0800 Yes Abiodun Woods MD     tacrolimus (GENERIC EQUIVALENT) 0.5 MG capsule  Last filled 2/23/23 #30 Take 1 capsule (0.5 mg) by mouth daily Total dose: 2mg in AM and 2.5mg in PM 3/5/2023 Yes Abiodun Woods MD     tacrolimus (GENERIC EQUIVALENT) 1 MG capsule  Last filled 2/20/23 #150 Take 2 capsules (2 mg) by mouth 2 times daily Total dose: 2mg in AM and 2.5mg in PM 3/6/2023 at 0800 Yes Abiodun Woods MD     tamsulosin (FLOMAX) 0.4 MG capsule  Last filled 2/21/23 #30 Take 1 capsule (0.4 mg) by mouth daily Past Week Yes Abiodun Woods MD     warfarin ANTICOAGULANT (COUMADIN) 1  MG tablet  Last filled 1/1/23 #30 Take 1 tablet daily (along with 2 mg tablet) as directed by Anticoagulation Clinic. 3/5/2023 Yes Abiodun Woods MD     warfarin ANTICOAGULANT (COUMADIN) 2 MG tablet  Last filled 2/20/23 #168 Take 1 to 2 tablets daily as directed by Anticoagulation Clinic. 3/5/2023 Yes Abiodun Woods MD     insulin glargine (LANTUS PEN) 100 UNIT/ML pen  Basaglar filled 9/15/22 #15mL Inject 3 Units Subcutaneous every morning   Abiodun Woods MD Yes    insulin pen needle (32G X 4 MM) 32G X 4 MM miscellaneous Use 4 pen needles daily or as directed.   Abiodun Woods MD         Date completed: 03/08/23    Medication history completed by: Claudia Warren [facility]

## 2023-03-09 ENCOUNTER — APPOINTMENT (OUTPATIENT)
Dept: GENERAL RADIOLOGY | Facility: CLINIC | Age: 58
DRG: 177 | End: 2023-03-09
Attending: PHYSICIAN ASSISTANT
Payer: COMMERCIAL

## 2023-03-09 LAB
ANION GAP SERPL CALCULATED.3IONS-SCNC: 12 MMOL/L (ref 7–15)
BUN SERPL-MCNC: 16.5 MG/DL (ref 6–20)
CALCIUM SERPL-MCNC: 8.7 MG/DL (ref 8.6–10)
CHLORIDE SERPL-SCNC: 108 MMOL/L (ref 98–107)
CREAT SERPL-MCNC: 0.93 MG/DL (ref 0.67–1.17)
CRP SERPL-MCNC: 26.3 MG/L
CYCLE THRESHOLD (CT): 20.2
D DIMER PPP FEU-MCNC: <0.27 UG/ML FEU (ref 0–0.5)
DEPRECATED HCO3 PLAS-SCNC: 19 MMOL/L (ref 22–29)
ERYTHROCYTE [DISTWIDTH] IN BLOOD BY AUTOMATED COUNT: 15.9 % (ref 10–15)
GFR SERPL CREATININE-BSD FRML MDRD: >90 ML/MIN/1.73M2
GLUCOSE BLDC GLUCOMTR-MCNC: 126 MG/DL (ref 70–99)
GLUCOSE BLDC GLUCOMTR-MCNC: 150 MG/DL (ref 70–99)
GLUCOSE BLDC GLUCOMTR-MCNC: 166 MG/DL (ref 70–99)
GLUCOSE SERPL-MCNC: 191 MG/DL (ref 70–99)
HCT VFR BLD AUTO: 35.2 % (ref 40–53)
HGB BLD-MCNC: 10.9 G/DL (ref 13.3–17.7)
INR PPP: 1.79 (ref 0.85–1.15)
MAGNESIUM SERPL-MCNC: 1.1 MG/DL (ref 1.7–2.3)
MAGNESIUM SERPL-MCNC: 2.3 MG/DL (ref 1.7–2.3)
MCH RBC QN AUTO: 25 PG (ref 26.5–33)
MCHC RBC AUTO-ENTMCNC: 31 G/DL (ref 31.5–36.5)
MCV RBC AUTO: 81 FL (ref 78–100)
PHOSPHATE SERPL-MCNC: 1.7 MG/DL (ref 2.5–4.5)
PHOSPHATE SERPL-MCNC: 2.4 MG/DL (ref 2.5–4.5)
PLATELET # BLD AUTO: 127 10E3/UL (ref 150–450)
POTASSIUM SERPL-SCNC: 3.6 MMOL/L (ref 3.4–5.3)
PROCALCITONIN SERPL IA-MCNC: 0.13 NG/ML
RBC # BLD AUTO: 4.36 10E6/UL (ref 4.4–5.9)
SARS-COV-2 RNA RESP QL NAA+PROBE: POSITIVE
SODIUM SERPL-SCNC: 139 MMOL/L (ref 136–145)
UFH PPP CHRO-ACNC: 0.59 IU/ML
UFH PPP CHRO-ACNC: 0.76 IU/ML
UFH PPP CHRO-ACNC: 0.81 IU/ML
WBC # BLD AUTO: 5.3 10E3/UL (ref 4–11)

## 2023-03-09 PROCEDURE — 84100 ASSAY OF PHOSPHORUS: CPT | Performed by: PHYSICIAN ASSISTANT

## 2023-03-09 PROCEDURE — 36415 COLL VENOUS BLD VENIPUNCTURE: CPT

## 2023-03-09 PROCEDURE — 85379 FIBRIN DEGRADATION QUANT: CPT | Performed by: STUDENT IN AN ORGANIZED HEALTH CARE EDUCATION/TRAINING PROGRAM

## 2023-03-09 PROCEDURE — 250N000011 HC RX IP 250 OP 636

## 2023-03-09 PROCEDURE — 99233 SBSQ HOSP IP/OBS HIGH 50: CPT | Mod: CS | Performed by: PHYSICIAN ASSISTANT

## 2023-03-09 PROCEDURE — 250N000012 HC RX MED GY IP 250 OP 636 PS 637: Performed by: PHYSICIAN ASSISTANT

## 2023-03-09 PROCEDURE — 250N000013 HC RX MED GY IP 250 OP 250 PS 637

## 2023-03-09 PROCEDURE — 80048 BASIC METABOLIC PNL TOTAL CA: CPT | Performed by: STUDENT IN AN ORGANIZED HEALTH CARE EDUCATION/TRAINING PROGRAM

## 2023-03-09 PROCEDURE — 93010 ELECTROCARDIOGRAM REPORT: CPT | Performed by: INTERNAL MEDICINE

## 2023-03-09 PROCEDURE — 86140 C-REACTIVE PROTEIN: CPT | Performed by: STUDENT IN AN ORGANIZED HEALTH CARE EDUCATION/TRAINING PROGRAM

## 2023-03-09 PROCEDURE — 99233 SBSQ HOSP IP/OBS HIGH 50: CPT | Mod: GC | Performed by: STUDENT IN AN ORGANIZED HEALTH CARE EDUCATION/TRAINING PROGRAM

## 2023-03-09 PROCEDURE — 250N000013 HC RX MED GY IP 250 OP 250 PS 637: Performed by: STUDENT IN AN ORGANIZED HEALTH CARE EDUCATION/TRAINING PROGRAM

## 2023-03-09 PROCEDURE — 36415 COLL VENOUS BLD VENIPUNCTURE: CPT | Performed by: INTERNAL MEDICINE

## 2023-03-09 PROCEDURE — 83735 ASSAY OF MAGNESIUM: CPT

## 2023-03-09 PROCEDURE — 85027 COMPLETE CBC AUTOMATED: CPT | Performed by: STUDENT IN AN ORGANIZED HEALTH CARE EDUCATION/TRAINING PROGRAM

## 2023-03-09 PROCEDURE — 250N000012 HC RX MED GY IP 250 OP 636 PS 637: Performed by: STUDENT IN AN ORGANIZED HEALTH CARE EDUCATION/TRAINING PROGRAM

## 2023-03-09 PROCEDURE — 84145 PROCALCITONIN (PCT): CPT

## 2023-03-09 PROCEDURE — 71045 X-RAY EXAM CHEST 1 VIEW: CPT | Mod: 26 | Performed by: RADIOLOGY

## 2023-03-09 PROCEDURE — 36415 COLL VENOUS BLD VENIPUNCTURE: CPT | Performed by: STUDENT IN AN ORGANIZED HEALTH CARE EDUCATION/TRAINING PROGRAM

## 2023-03-09 PROCEDURE — 84100 ASSAY OF PHOSPHORUS: CPT

## 2023-03-09 PROCEDURE — U0005 INFEC AGEN DETEC AMPLI PROBE: HCPCS

## 2023-03-09 PROCEDURE — 250N000011 HC RX IP 250 OP 636: Performed by: STUDENT IN AN ORGANIZED HEALTH CARE EDUCATION/TRAINING PROGRAM

## 2023-03-09 PROCEDURE — 99233 SBSQ HOSP IP/OBS HIGH 50: CPT | Mod: GC | Performed by: INTERNAL MEDICINE

## 2023-03-09 PROCEDURE — 85520 HEPARIN ASSAY: CPT | Performed by: STUDENT IN AN ORGANIZED HEALTH CARE EDUCATION/TRAINING PROGRAM

## 2023-03-09 PROCEDURE — 214N000001 HC R&B CCU UMMC

## 2023-03-09 PROCEDURE — 83735 ASSAY OF MAGNESIUM: CPT | Performed by: PHYSICIAN ASSISTANT

## 2023-03-09 PROCEDURE — 999N000157 HC STATISTIC RCP TIME EA 10 MIN

## 2023-03-09 PROCEDURE — 85520 HEPARIN ASSAY: CPT | Performed by: INTERNAL MEDICINE

## 2023-03-09 PROCEDURE — 71045 X-RAY EXAM CHEST 1 VIEW: CPT

## 2023-03-09 PROCEDURE — 93005 ELECTROCARDIOGRAM TRACING: CPT

## 2023-03-09 PROCEDURE — 999N000127 HC STATISTIC PERIPHERAL IV START W US GUIDANCE

## 2023-03-09 PROCEDURE — 258N000003 HC RX IP 258 OP 636: Performed by: STUDENT IN AN ORGANIZED HEALTH CARE EDUCATION/TRAINING PROGRAM

## 2023-03-09 PROCEDURE — 250N000009 HC RX 250: Performed by: PHYSICIAN ASSISTANT

## 2023-03-09 PROCEDURE — 85610 PROTHROMBIN TIME: CPT | Performed by: STUDENT IN AN ORGANIZED HEALTH CARE EDUCATION/TRAINING PROGRAM

## 2023-03-09 PROCEDURE — 94640 AIRWAY INHALATION TREATMENT: CPT | Mod: 76

## 2023-03-09 RX ORDER — VORICONAZOLE 200 MG/1
400 TABLET, FILM COATED ORAL EVERY 12 HOURS SCHEDULED
Status: COMPLETED | OUTPATIENT
Start: 2023-03-09 | End: 2023-03-10

## 2023-03-09 RX ORDER — WARFARIN SODIUM 7.5 MG/1
7.5 TABLET ORAL
Status: DISCONTINUED | OUTPATIENT
Start: 2023-03-09 | End: 2023-03-09

## 2023-03-09 RX ORDER — WARFARIN SODIUM 5 MG/1
5 TABLET ORAL
Status: DISCONTINUED | OUTPATIENT
Start: 2023-03-09 | End: 2023-03-09

## 2023-03-09 RX ORDER — TACROLIMUS 1 MG/1
1 CAPSULE ORAL
Status: DISCONTINUED | OUTPATIENT
Start: 2023-03-10 | End: 2023-03-14 | Stop reason: HOSPADM

## 2023-03-09 RX ORDER — MAGNESIUM SULFATE HEPTAHYDRATE 40 MG/ML
4 INJECTION, SOLUTION INTRAVENOUS ONCE
Status: COMPLETED | OUTPATIENT
Start: 2023-03-09 | End: 2023-03-09

## 2023-03-09 RX ORDER — WARFARIN SODIUM 4 MG/1
4 TABLET ORAL
Status: COMPLETED | OUTPATIENT
Start: 2023-03-09 | End: 2023-03-09

## 2023-03-09 RX ADMIN — PANTOPRAZOLE SODIUM 40 MG: 40 TABLET, DELAYED RELEASE ORAL at 08:22

## 2023-03-09 RX ADMIN — AMLODIPINE BESYLATE 10 MG: 10 TABLET ORAL at 08:22

## 2023-03-09 RX ADMIN — LEVALBUTEROL HYDROCHLORIDE 1.25 MG: 1.25 SOLUTION RESPIRATORY (INHALATION) at 21:32

## 2023-03-09 RX ADMIN — INSULIN ASPART 1 UNITS: 100 INJECTION, SOLUTION INTRAVENOUS; SUBCUTANEOUS at 17:39

## 2023-03-09 RX ADMIN — SULFAMETHOXAZOLE AND TRIMETHOPRIM 1 TABLET: 400; 80 TABLET ORAL at 08:22

## 2023-03-09 RX ADMIN — LAMOTRIGINE 50 MG: 25 TABLET ORAL at 19:36

## 2023-03-09 RX ADMIN — PREDNISONE 2.5 MG: 2.5 TABLET ORAL at 17:40

## 2023-03-09 RX ADMIN — TACROLIMUS 1.5 MG: 1 CAPSULE ORAL at 17:40

## 2023-03-09 RX ADMIN — TAMSULOSIN HYDROCHLORIDE 0.4 MG: 0.4 CAPSULE ORAL at 19:37

## 2023-03-09 RX ADMIN — POTASSIUM & SODIUM PHOSPHATES POWDER PACK 280-160-250 MG 2 PACKET: 280-160-250 PACK at 17:40

## 2023-03-09 RX ADMIN — FLUOXETINE 20 MG: 20 CAPSULE ORAL at 08:22

## 2023-03-09 RX ADMIN — HEPARIN SODIUM 950 UNITS/HR: 10000 INJECTION, SOLUTION INTRAVENOUS at 21:23

## 2023-03-09 RX ADMIN — POTASSIUM & SODIUM PHOSPHATES POWDER PACK 280-160-250 MG 2 PACKET: 280-160-250 PACK at 10:08

## 2023-03-09 RX ADMIN — WARFARIN SODIUM 4 MG: 4 TABLET ORAL at 17:40

## 2023-03-09 RX ADMIN — MIRTAZAPINE 7.5 MG: 7.5 TABLET, FILM COATED ORAL at 21:23

## 2023-03-09 RX ADMIN — LEVALBUTEROL HYDROCHLORIDE 1.25 MG: 1.25 SOLUTION RESPIRATORY (INHALATION) at 13:39

## 2023-03-09 RX ADMIN — POTASSIUM & SODIUM PHOSPHATES POWDER PACK 280-160-250 MG 2 PACKET: 280-160-250 PACK at 14:12

## 2023-03-09 RX ADMIN — REMDESIVIR 100 MG: 100 INJECTION, POWDER, LYOPHILIZED, FOR SOLUTION INTRAVENOUS at 19:37

## 2023-03-09 RX ADMIN — LEVOTHYROXINE SODIUM 25 MCG: 0.03 TABLET ORAL at 08:22

## 2023-03-09 RX ADMIN — SODIUM CHLORIDE 50 ML: 9 INJECTION, SOLUTION INTRAVENOUS at 19:37

## 2023-03-09 RX ADMIN — LEVALBUTEROL HYDROCHLORIDE 1.25 MG: 1.25 SOLUTION RESPIRATORY (INHALATION) at 09:07

## 2023-03-09 RX ADMIN — PREDNISONE 5 MG: 5 TABLET ORAL at 08:22

## 2023-03-09 RX ADMIN — VORICONAZOLE 400 MG: 200 TABLET ORAL at 19:36

## 2023-03-09 RX ADMIN — TACROLIMUS 2 MG: 1 CAPSULE ORAL at 08:22

## 2023-03-09 RX ADMIN — MAGNESIUM SULFATE IN WATER 4 G: 40 INJECTION, SOLUTION INTRAVENOUS at 10:08

## 2023-03-09 ASSESSMENT — ACTIVITIES OF DAILY LIVING (ADL)
ADLS_ACUITY_SCORE: 29

## 2023-03-09 NOTE — PROGRESS NOTES
"CLINICAL NUTRITION SERVICES - ASSESSMENT NOTE     Nutrition Prescription    RECOMMENDATIONS FOR MDs/PROVIDERS TO ORDER:  1. Order calcium/vitamin D BID as pt on prednisone   2. Replace phos (phos of 1.7 on 3/9) and magnesium (Mg++ of 1.1 on 3/9). Monitor lytes (Phos, Mg++, and K+) for refeeding syndrome. If lytes trend low, aggressively replace. Ensure the lyte Replacement ADULT order set/s is/are implemented and select the option for high replacement. Consider thiamine if suspect refeeding.      Malnutrition Status:    Patient does not meet two of the established criteria necessary for diagnosing malnutrition but is at risk for malnutrition    Recommendations already ordered by Registered Dietitian (RD):  None additionally at this time.    Future/Additional Recommendations:  1. Rec continue current diet, as ordered (liberalized to encourage oral intake). Encourage pt to self-select oral supplements/snack as desired. Encourage high protein options.    2. Order a multivitamin with minerals, if inadequate oral intake continues, to help meet micronutrient needs.   3. Continue remeron, as ordered, which may help increase oral intake.  4. Monitor BG control. Hgb A1c of 6.1 on 11/14. BG was 145 on 6/29/2022.   5. Consider checking vitamin B12 status.      REASON FOR ASSESSMENT  Edson Thornton  is a/an 57 year old male assessed by the dietitian for Admission Nutrition Risk Screen for malnutrition score of two or greater.     NUTRITION/ADDITIONAL HISTORY  Per RD note 9/2021, \"Assessed by the dietitian for Admission Nutrition Risk Screen for positive (14-23 lbs and poor appetite) and Consult: Referral for lung transplant evaluation. Patient reported his appetite has not been great due to 'everything happening.' He is eating about 50% of normal. B- 2 eggs, 2 slices toast, guevara, hash-browns and orange juice. L- skips, D- variable, fiance cooks. He reported he has previously lost weight but gained it back to usual weight " "of 160 lbs. He has no food allergies. Does not follow special diets at home. No current vitamin/mineral supplements. Diet: Regular. Intake/Tolerance: %.\"  Per RD note 12/27/2021, \"Diet: Regular. Supplement: Kim Nepro once daily at breakfast meal. Nutrition Support: None, G/J tube removed on 12/24. 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   Per discussion with pt, he has never really had an appetite. States he eats because he knows he needs to eat. Overall, has been eating less than usual for the last three to four weeks due to not feeling well. Admits that some days he did not eat anything all day PTA.     CURRENT NUTRITION ORDERS  Diet: Regular since 3/6. Has prn snack/supplement order (oral supplement menu provided 3/7, pt self-selecting).   Intake/Tolerance: Tolerating diet. Per nursing flowsheets (% intake), pt with a good appetite 3/6, % (ate 100% of a protein bar) on 3/7, and % with a good appetite 3/8. Decreased appetite with illness, fatigue. [employer] (meal ordering system) indicates food/beverages sent 3/7-3/8 totaled 2723 kcals and 94 g protein daily on average (ordering two to three meals daily). Pt did try a [employer] oral supplement and states he did not mind it. He likes the Special K bars and has been ordering these.     LABS  Labs reviewed    MEDICATIONS  Medications reviewed    ANTHROPOMETRICS  Height: 162.6 cm (5' 4\")  Most Recent Weight: 70 kg (154 lb 6.4 oz)    IBW: 59.1 kg "   BMI: Overweight BMI 25-29.9  Weight History: 70.8 kg (3/1/2022), 74.8 kg (9/12/2022), 73 kg (12/20/2022), 72.8 kg (2/3/2023), 69 kg (3/6/2023, admit), 70 kg (3/7) - Has lost 3.2% of his body wt over the last approximate month. Fluctuations in wt but no significant/severe wt loss over the last year.   Dosing Weight: 70 kg (based on lowest wt so far this admission of 70 kg on 3/7)    ASSESSED NUTRITION NEEDS (for inpatient hospital stay)  Estimated Energy Needs: 5696-3554 kcals/day (25 - 30 kcals/kg)  Justification: Maintenance needs  Estimated Protein Needs:  grams protein/day (1.2 - 1.5 grams of pro/kg)  Justification: Increased needs with stress factors  Estimated Fluid Needs: 8690-3134 mL/day (25 - 30 mL/kg)   Justification: Maintenance needs or per provider, pending fluid status    PHYSICAL FINDINGS/OTHER FINDINGS  See malnutrition section below.  GI: Pt having zero to three stools daily. Last stool on 3/7. No N/V. Per EHR, has intermittent heartburn symptoms.     MALNUTRITION  % Intake: </=75% for >/= 1 month (severe)  % Weight Loss: Weight loss does not meet criteria  Subcutaneous Fat Loss: None observed/visualized  Muscle Loss: None observed/visualized  Fluid Accumulation/Edema: None noted  Malnutrition Diagnosis: Patient does not meet two of the established criteria necessary for diagnosing malnutrition but is at risk for malnutrition     NUTRITION DIAGNOSIS  Inadequate oral intake related to decreased appetite with illness, fatigue as evidenced by pt consuming 50% of meals, at times, and pt report of eating less for the past three to four weeks (and some days not eating at all).       INTERVENTIONS  Implementation  Nutrition Education: Importance of adequate nutrition intake discussed with pt. Rec small, frequent meals to help increase oral intake. Encourage pt to self-selected tolerated foods/beverages.   Medical food supplement therapy: Discussed oral supplement options (pt previously received oral  supplement menu). Pt may request snacks/oral supplements prn. Offered to schedule oral supplements/snacks. Pt declines at this time and would prefer to order these on his own via his oral supplement menu.     Goals  Patient to consume % of nutritionally adequate meal trays TID, or the equivalent with supplements/snacks.     Monitoring/Evaluation  Progress toward goals will be monitored and evaluated per protocol.       Nutrition will continue to follow.      Inessa Patel, MS, RD, LD, [facility]   6C Pgr: [phone]

## 2023-03-09 NOTE — PROGRESS NOTES
Pt placed on home bipap settings: 20/12, RR 14, 21% O2. Tolerating well, satting between 93-95%. Will continue to monitor.    RT Martin on 3/9/2023 at 12:35 AM

## 2023-03-09 NOTE — PROGRESS NOTES
[id number]  Neuro: A&Ox4  Cardiac: Afebrile, VSS. No tele orders, HR 60s overnight.              Respiratory: RA during day. Hospital CPAP at night.  GI/: Voiding spontaneously. LBM 3/8.  Diet/appetite: Fair appetite, regular diet  Activity: SBA  Pain: Denies pain  Skin: scattered bruising  Lines: PIV x 2. Heparin infusing at 1,050 units/hr. Next antiXa 1048.   Plan: cont POC

## 2023-03-09 NOTE — PLAN OF CARE
Goal Outcome Evaluation:    Pt feeling very fatigued and run down today; more quiet, low energy. Reports mild sore ache in chest; unsure if related to coughing or not. Sats were maintained on RA during day/CPAP at night; however this afternoon sats dropped into 88-91% range & pt currently using 2L NC to keep >92. Harsh coarse cough; productive at times. Generally appears ill-feeling. Afebrile.  Plan to have last remdezivir dose today. COVID swab sent again today-results positive with CTE level too high to remove precautions.  Transplant ID recommending to start  Voriconazole this alireza.  Mg & Phos levels significantly low-both replaced per protocols.   INR dropped to 1.79-plan to continue heparin gtt @ 950u/hr with next 10a level check at 1530.   Declined repeated offers for ADLs. No appetite-only wanted popsicles this afternoon.

## 2023-03-09 NOTE — PLAN OF CARE
Goal Outcome Evaluation:      Plan of Care Reviewed With: patient    Overall Patient Progress: no changeOverall Patient Progress: no change    Outcome Evaluation: Replace lytes and monitor for refeeding syndrome. Rec continue current diet, as ordered (liberalized to encourage oral intake). Encourage pt to self-select oral supplements/snacks as desired.

## 2023-03-09 NOTE — PROGRESS NOTES
Care Management Follow Up    Length of Stay (days): 3    Expected Discharge Date: 03/10/2023     Concerns to be Addressed: Discharge planning  Patient plan of care discussed at interdisciplinary rounds: Yes    Anticipated Discharge Disposition: Home       Anticipated Discharge Services: none  Anticipated Discharge DME: none    Education Provided on the Discharge Plan: Yes  Patient/Family in Agreement with the Plan: Yes    Additional Information:  Pt with history of NSIP/ILD, bilateral lung transplant on 10/16/2021. Pt admitted from OSH on 3/6/2023 with hypoxic respiratory failure.     Received update from primary team that pt may be able to discharge home as early as tomorrow and needs transportation. This writer called and spoke with pt on his cell phone. Pt states that a family member can come and pick him up but they need a day's advanced notice due to the distance. Pt states it will most likely be his son or daughter coming to pick him up. Updated primary team that pt just needs advanced notice. They are awaiting labs and then will update pt if plan is for discharge tomorrow so he can coordinate with family.    Pt on warfarin PTA, managed by [facility]. Plan to continue on warfarin.    CC will continue to monitor patient's medical condition and progress towards discharge.  Re Bennett RN BSN  6C Unit Care Coordinator  Phone number: [phone]  Pager: [phone]

## 2023-03-09 NOTE — PROGRESS NOTES
Pulmonary Medicine  Cystic Fibrosis - Lung Transplant Team  Progress Note  2023       Patient: Edson Thornton Jr.  MRN: [id number]  : 1965 (age 57 year old)  Transplant: 10/16/2021 (Lung), POD#[id number]  Admission date: 3/6/2023    Assessment & Plan:     Edson Thornton Jr. is a 57 year old male with a history of BSLT for NSIP/ILD (10/16/2021), bronchiectasis, moderate PH, RA, SALTY, chronic HSV infection, hypogammaglobulinemia, steroid-induced diabetes, hypothyroidism, PFO, HTN, HLD, duodenal anomaly, anxiety, and depression.  Post-op course complicated by encephalopathy and diffuse weakness, acute to subacute CVA, afib with RVR, DVT, BRENNAN, GI bleed, and Candidemia/Candida empyema.  The patient was admitted from OSH on 3/6/2023 for acute hypoxic respiratory failure in setting of COVID infection and PE.  Weaned to RA during the day 3/6 and overnight (with BiPAP adjustments) 3/8.    Addendum: Per discussion with primary team, plan to start voriconazole per transplant ID.  Decreased tacrolimus to 1 mg qAM / 1.5 mg qPM (known interaction with voriconazole) and daily tacrolimus levels ordered through 3/12 for close monitoring.  Pt. now requiring 2L NC, procal 0.13 (from 0.07), awaiting CXR, low threshold for empiric ABX if further clinical decine.  Also will need to consider CT PE if with worsening hypoxia as unable to definitively confirm presence of PE on OSH CTA (after discussion with radiologist).  Discussed trial of gentle diuresis with primary team.     Today's recommendations:  - COVID testing with cycle threshold 3/16 (not yet ordered)  - Follow pending sputum cultures (3/8)  - Follow pending BDG fungitell and Aspergillus galactomannan (3/8)  - Defer antifungal treatment pending culture speciation and additional work up  - Remdesivir through today per transplant ID  - Exercise oximetry study 24-48h prior to discharge  - Repeat CXR ordered today  - Defer repeat CT PE (to confirm ?PE seen on prior OSH  imaging) at this time unless with clinical decline  - PT consult  - Will try to reschedule pulmonary follow up for ~2 weeks from discharge (around this time repeat chest CT without contrast, revisit resuming Myfortic, and repeat CMV for close monitoring)  - Tacrolimus level ordered tomorrow  - DSA (3/7) pending  - Repeat IgG 4/8 (not yet ordered)  - Increase PPI to BID if reflux persists     Acute hypoxic respiratory failure:  COVID infection:   Concern for PE: Initially with HA, productive cough (yellow/green sputum), dyspnea, chest tightness, and fevers (Tmax 102), tested positive for COVID 2/16.  Received remdesivir x3 days followed by 1-2 days of symptomatic improvement then with return of symptoms and exertional hypoxia (baseline RA with BiPAP overnight).  Also started on steroid burst and 2 week course of levofloxacin.  Presented to OSH 3/5 with initiation of IV ABX, remdesivir, and dexamethasone.  Initially on 4L NC, currently on RA during the day (BiPAP overnight at baseline).  H/o Klebsiella pneumoniae, Pseudomonas fluorescens/putida, Staph epi, Candida (all 2021) and nonsporulating saprophytic fungus (6/2022) on sputum cultures.  OSH infectious work up including Strep pneumoniae, Legionella, respiratory panel, and MRSA nares all negative, COVID remains positive (cycle threshold 20.2 on 3/9).  Procal mildly elevated, CRP elevated, no leukocytosis.  CTA chest 3/5 (OSH read) with 2 small fillings defects seen in right apical pulmonary artery (suspicious for subtle acute PE) and scattered nonspecific GGO t/o which have somewhat nodular appearance and peripherally located.  DDx include progression of COVID infection v other/new pneumonia (bacterial, fungal), PE (possibly noted on CTA chest, on warfarin PTA, D-dimer negative, BLE US negative for DVT 3/8), less likely  or rejection.  Dexamethasone and ABX discontinued 3/7 per transplant ID as now on RA and with lower suspicion for bacterial PNA.  - Repeat  COVID testing with cycle threshold weekly (due 3/16, not yet ordered)  - Bacterial sputum culture (3/8) with filamentous fungus, follow  - Fungal sputum culture (3/8) with yeast, follow  - BDG fungitell and Aspergillus galactomannan (3/8) pending given filamentous fungus on culture  - Monitoring off ABX, defer antifungal treatment pending culture speciation and additional work up  - Remdesivir through 3/9 per transplant ID  - Nebs: Xopenex TID  - Aerobika and IS hourly while awake  - Supplemental oxygen as needed to maintain SpO2 >92%, exercise oximetry study 24-48h prior to discharge  - Repeat CXR today (ordered)  - Defer repeat CT PE (to confirm ?PE seen on prior OSH imaging) at this time unless with clinical decline  - Repeat CT chest without contrast in ~2 weeks (around time of pulmonary follow up)  - AC management per hematology and primary team  - PT consult (ordered)     S/p bilateral sequential lung transplant (BSLT) for NSIP/ILD: Seen in pulmonary clinic 12/20/22 with intermittent dyspnea, PFTs with marked improvement and no acute changes on CXR.  EBV negative 3/7.  - Pulmonary follow up currently scheduled 4/25, will try to reschedule for ~2 weeks from discharge     Immunosuppression:  - Tacrolimus 2 mg qAM / 2.5 mg qPM.  Goal level 8-10.  Repeat level 3/10 (ordered).  - Myfortic held 3/5 in setting of COVID (prior dose 720 mg BID), revisit resuming in ~2 weeks  - Prednisone 5 mg qAM / 2.5 mg qPM     Prophylaxis:   - Bactrim for PJP ppx  - CMV D-/R-, although with recent CMV <35 on 1/27 (previously not detected) which raises the question of seroconversion (will keep this in mind if started on increased steroids in future), repeat IgG and quant negative 3/7, repeat CMV in ~2 weeks      Positive crossmatch:   Positive DSA: Had a retrospective positive crossmatch following transplantation, attributed to receiving rituximab.  DSA initially positive 11/29/21.  Most recently remains positive (DQ:5) with mfi 1533  "on 12/20/22 from prior mfi 891 on 11/14/22 and 1681 on 9/12/22.  - DSA (3/7) pending     Hypogammaglobulinemia: IgG adequate at 502 on 1/3.  Repeat IgG low at 168 on 3/7, s/p IVIG on 3/8.   - Repeat IgG 4/8 (not yet ordered)     SALTY: Continue PTA NIPPV overnight (see RT note 3/9 for details).    We appreciate the excellent care provided by the [place] [id number] team.  Recommendations communicated via telephone and this note.  Will continue to follow along closely, please do not hesitate to call with any questions or concerns.    Patient discussed with Dr. Johns.    Keyla Rice PA-C  Inpatient [facility]  Pulmonary CF/Transplant     Subjective & Interval History:     Remains on RA during the day with BiPAP overnight, adjusted by RT to reflect home settings.  Slept well although feels very tired this morning.  Ongoing MYERS and occasional SOB at rest.  Cough productive of grey/green sputum.  Chest discomfort with deep breath and cough.  Tolerated IVIG infusion yesterday.    Review of Systems:     C: No chills, + decreased appetite  INTEGUMENTARY/SKIN: No rash or obvious new lesions  ENT/MOUTH: + dry throat, no sinus pain, no nasal congestion or drainage  RESP: See interval history  CV: No palpitations, no peripheral edema  GI: No nausea, no vomiting, no change in stools, + intermittent reflux symptoms  : No dysuria  MUSCULOSKELETAL: No myalgias, no arthralgias  ENDOCRINE: + blood sugars elevated  NEURO: No headache, no new numbness or tingling  PSYCHIATRIC: Mood stable    Physical Exam:     All notes, images, and labs from past 24 hours (at minimum) were reviewed.    Vital signs:  Temp: 97.7  F (36.5  C) Temp src: Axillary BP: 128/74 Pulse: 70   Resp: 16 SpO2: 94 % O2 Device: BiPAP/CPAP   Height: 162.6 cm (5' 4\") Weight: 70 kg (154 lb 6.4 oz)  I/O:     Intake/Output Summary (Last 24 hours) at 3/9/2023 0810  Last data filed at 3/9/2023 0355  Gross per 24 hour   Intake 408.33 ml   Output 550 ml   Net -141.67 ml "     Constitutional: Sitting up in bed, in no apparent distress.   HEENT: Eyes with pink conjunctivae, anicteric.  Oral mucosa moist without lesions.   PULM: Fair air flow bilaterally.  Scatteredd crackles with occasional wheeze.  No rhonchi.  Non-labored breathing on RA.  CV: Normal S1 and S2.  RRR.  No murmur, gallop, or rub.  No peripheral edema.   ABD: NABS, soft, nontender, nondistended.    MSK: Moves all extremities.  No apparent muscle wasting.   NEURO: Sleepy although easily wakes to voice, conversant and answering questions appropriately.   SKIN: Warm, dry.  No rash on limited exam.   PSYCH: Mood stable.     Lines, Drains, and Devices:  Peripheral IV 03/06/23 Anterior;Right Upper forearm (Active)   Site Assessment [facility] 03/09/23 0417   Line Status Other (Comment) 03/09/23 0417   Phlebitis Scale 0-->no symptoms 03/09/23 0417   Infiltration Scale 0 03/09/23 0417   Number of days: 3       Peripheral IV 03/07/23 Left;Posterior Lower forearm (Active)   Site Assessment [facility] 03/09/23 0417   Line Status Infusing 03/09/23 0417   Phlebitis Scale 0-->no symptoms 03/09/23 0417   Infiltration Scale 0 03/09/23 0417   Number of days: 2     Data:     LABS    CMP:   Recent Labs   Lab 03/08/23  2127 03/08/23  1801 03/08/23  1543 03/08/23  1000 03/08/23  0624 03/07/23  1314 03/07/23  0626 03/06/23  1845 03/06/23  1843 03/03/23  0823   0000   NA  --   --   --   --  139  --  140  --  138  --   --    POTASSIUM  --   --   --   --  4.3  --  4.7  --  4.9  --   --    CHLORIDE  --   --   --   --  109*  --  109*  --  105 105  --    CO2  --   --   --   --  19*  --  20*  --  21*  --   --    ANIONGAP  --   --   --   --  11  --  11  --  12  --   --    * 114* 134* 132* 257*   < > 170*   < > 160*  --    < >   BUN  --   --   --   --  18.5  --  20.1*  --  18.6  --   --    CR  --   --   --   --  0.95  --  1.10  --  1.10  --   --    GFRESTIMATED  --   --   --   --  >90  --  78  --  78  --   --    ERIK  --   --   --   --  8.5*  --  8.3*  --   8.7  --   --    PROTTOTAL  --   --   --   --   --   --   --   --  5.4*  --   --    ALBUMIN  --   --   --   --   --   --   --   --  3.4*  --   --    BILITOTAL  --   --   --   --   --   --   --   --  0.2  --   --    ALKPHOS  --   --   --   --   --   --   --   --  71  --   --    AST  --   --   --   --   --   --   --   --  32  --   --    ALT  --   --   --   --   --   --   --   --  23  --   --     < > = values in this interval not displayed.     CBC:   Recent Labs   Lab 03/09/23  0706 03/08/23  0624 03/07/23  0626 03/06/23  1843   WBC 5.3 5.2 6.9 5.0   RBC 4.36* 4.01* 4.29* 4.31*   HGB 10.9* 10.0* 10.7* 10.8*   HCT 35.2* 33.0* 35.4* 34.8*   MCV 81 82 83 81   MCH 25.0* 24.9* 24.9* 25.1*   MCHC 31.0* 30.3* 30.2* 31.0*   RDW 15.9* 15.9* 15.9* 15.9*   * 132* 154 123*       INR:   Recent Labs   Lab 03/09/23  0706 03/08/23  1017 03/06/23  1843 03/03/23  0823   INR 1.79* 2.09* 2.57* 2.3*       Glucose:   Recent Labs   Lab 03/08/23  2127 03/08/23  1801 03/08/23  1543 03/08/23  1000 03/08/23  0624 03/08/23  0328   * 114* 134* 132* 257* 161*       Blood Gas: No lab results found in last 7 days.    Culture Data No results for input(s): CULT in the last 168 hours.    Virology Data:   Lab Results   Component Value Date    FLUAH1 Not Detected 06/21/2022    FLUAH3 Not Detected 06/21/2022    [id number] Not Detected 06/21/2022    IFLUB Not Detected 06/21/2022    RSVA Not Detected 06/21/2022    RSVB Not Detected 06/21/2022    PIV1 Not Detected 06/21/2022    PIV2 Not Detected 06/21/2022    PIV3 Not Detected 06/21/2022    HMPV Not Detected 06/21/2022    HRVS Negative 04/19/2022    ADVBE Negative 04/19/2022    ADVC Negative 04/19/2022    ADVC Negative 11/22/2021    ADVC Negative 11/12/2021       Historical CMV results (last 3 of prior testing):  Lab Results   Component Value Date    CMVQNT Not Detected 03/07/2023    CMVQNT Not Detected 11/14/2022    CMVQNT Not Detected 09/12/2022     No results found for: CMVLOG    Urine Studies     Recent Labs   Lab Test 11/01/21  1336 10/25/21  1507   URINEPH 5.5 5.5   NITRITE Negative Negative   LEUKEST Negative Negative   WBCU 0 2       Most Recent [place] Pulmonary Function Testing (FVC/FEV1 only)  FVC-Pre   Date Value Ref Range Status   12/20/2022 2.46 L    11/14/2022 2.12 L    09/12/2022 2.27 L    06/20/2022 2.14 L      FVC-%Pred-Pre   Date Value Ref Range Status   12/20/2022 68 %    11/14/2022 54 %    09/12/2022 57 %    06/20/2022 54 %      FEV1-Pre   Date Value Ref Range Status   12/20/2022 1.61 L    11/14/2022 1.22 L    09/12/2022 1.42 L    06/20/2022 1.29 L      FEV1-%Pred-Pre   Date Value Ref Range Status   12/20/2022 56 %    11/14/2022 39 %    09/12/2022 45 %    06/20/2022 41 %        IMAGING    Recent Results (from the past 48 hour(s))   US Lower Extremity Venous Duplex Bilateral    Narrative    EXAMINATION: US LOWER EXTREMITY VENOUS DUPLEX BILATERAL  3/8/2023 5:26  PM      CLINICAL HISTORY: PE    COMPARISON: None        PROCEDURE COMMENTS: Ultrasound was performed of the deep venous system  of the right and left lower extremity using grayscale, color, and  spectral Doppler.    FINDINGS:  The common femoral, greater saphenous origin, femoral, popliteal, and  deep calf veins are visualized and are patent. Venous waveforms are  normal. There is normal response to compression.      Impression    IMPRESSION:.  No deep vein thrombosis in the right or left lower extremity.    I have personally reviewed the examination and initial interpretation  and I agree with the findings.    MAURICIO NAVAS MD         SYSTEM ID:  [id number]

## 2023-03-09 NOTE — PROGRESS NOTES
[facility]    Transplant Infectious Diseases Inpatient Progress Note       Edson Thornton Jr. MRN# [id number]   YOB: 1965 Age: 57 year old   Date of Admission and time: 3/6/2023  3:53 PM            Recommendations:   - Continue Remdesivir 100mg daily for a total of 5 days  - Start empiric Voriconazole 350mg first 2 doses (6mg/kg) then 300mg BID (4mg/kg)   - Please get Voriconazole trough level after 5d (3/14)   - Weekly LFTs   - Baseline 12 lead EKG         Summary of Presentation:   Transplants:  10/16/2021 (Lung), Postoperative day:  509     This patient is a 57 year old man with a PMH of b/l lung transplant 10/16/2021 2/2 NSIP on Tac/MMF/Pred, RA who presents with persistent respiratory sx after initial COVID19 pneumonia 2/16/23.     2/16/23 developed acute onset SOB, MYERS, cough, fevers and was found to be positive for COVID. Reports that his entire household was sick at that time and many tested positive for COVID. He was seen by his PCP and it was arranged for him to receive Remdesivir infusions x3d at a local infusion center. States that with this he had started to feel a bit better but as soon as tx was completed his sx started to worsen again. He had persistent MYERS, cough, malaise and so he was additionally prescribed a 5d course of Prednisone 20mg on 2/27 and a 14d course of Levofloxicin on 2/28. Despite these treatments he continued to have symptoms which prompted him to present to OSH 3/5 for evaluation.     At the OSH he was hypoxic requiring 2L supplemental O2. His COVID PCR remained positive with CT 21.6. CT revealing of small R apical PE. ID was consulted and he was started on Remdesivir and Dexamethasone. His case was discussed with [facility] Transplant Pulmonology who recommended holding his MMF. Ultimately he was transferred to [facility] for Transplant Pulmonology and Transplant ID consultation. Sputum eventually grew filamentous fungi and decision was made to start  him on empiric Voriconazole.           Active Problems and Infectious Diseases Issues:   COVID19 Pneumonia   H/o b/l lung transplant 10/16/2021 on Tac/MMF/Pred   Most likely this is reactivation of COVID viral replication in the setting of lymphopenia and immunosuppression. Given he initially improved with 3d of Remdesivir and then worsened after discontinuation, potentially this was not a long enough treatment course for him. There is also potential that his outpatient steroid course contributed as, at least in studies, non-hypoxic COVID patients who received steroids tended towards worse outcomes. His cycle threshold of 21.6 also further supports this and his imaging is c/w with COVID pneumonia. We feel that bacterial superinfection is less likely in his case, he has been afebrile, stable on RA, procal of 0.07 without clear radiographical e/o bacterial PNA and would favor discontinuation of his Vancomycin and Cefepime. In addition he symptomatically worsened while on Levofloxacin (which has similar antimicrobial spectrum to Cefepime) further supporting this.      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 Unfortunately the BD glucan and Aspergillus galactomannan were drawn about 1H after administration of IVIG which commonly falsely elevates these tests so they will be difficult to interpret. In that setting favor starting empiric therapy with Voriconazole. At this time he is stable and we do not need to bridge with IV treatment until the Voriconazole is therapeutic. Please check a trough after 5d.         Old Problems and Infectious Diseases Issues:   - Historical pulmonary infections 11/2021 with Klebsiella pneumoniae, Pseudomonas putida (R- Meropenem otherwise sensitive); 6/2022 nonsporulating saprophytic fungus. Recurrent respiratory colonization with Candida albicans.      Other Infectious Disease issues include:  - QTc: 377 as of 9/5/21   - PJP prophylaxis: Bactrim  - Serostatus: CMV D-/R-, EBV D+/R+, HSV1+/2+, VZV+  - Immunization status: This patient received the (reported per patient - out of state records) 4th dose of the COVID-19 vaccine (bivalent).  - Gamma globulin status: 502 on 1/3    Thank you very much Dr. Rubio for involving me in the care of Mr. Edson Thornton Janeth. Please do not hesitate to call me for any question.     Transplant ID will continue to follow    This patient was discussed with the attending physician, Dr Abebe            Interval History:   Transplants:  10/16/2021 (Lung), Postoperative day:  509     NAEO. More tired this morning. Breathing is about the same, still with significant MYERS though feeling ok at rest. Cough now productive of greenish sputum. No hemoptysis. Denies fevers, chills.               Review of Systems:      As mentioned in the HPI otherwise negative by reviewing constitutional symptoms, central and peripheral neurological systems, respiratory system, cardiac system, GI system,  system, musculoskeletal, skin, allergy, and lymphatics.               Medications:   Medications that Require Transfusion:     heparin 950 Units/hr (03/09/23 0932)     - MEDICATION INSTRUCTIONS -       Scheduled Medications:     remdesivir  100 mg Intravenous Q24H    And     sodium chloride 0.9%  50 mL Intravenous Q24H     amLODIPine  10 mg Oral Daily     FLUoxetine  20 mg Oral Daily     insulin aspart  1-3 Units Subcutaneous TID AC     insulin aspart  1-3 Units Subcutaneous At Bedtime     lamoTRIgine  50 mg Oral QPM     levalbuterol  1.25 mg Nebulization TID     levothyroxine  25 mcg Oral QAM AC     mirtazapine  7.5 mg Oral At Bedtime     pantoprazole  40 mg Oral QAM AC     potassium & sodium phosphates  2 packet Oral or Feeding Tube Q4H     predniSONE  2.5 mg Oral QPM     predniSONE  5 mg Oral QAM     sodium chloride (PF)  3 mL Intracatheter Q8H      sodium chloride (PF)  3 mL Intracatheter Q8H     sulfamethoxazole-trimethoprim  1 tablet Oral Daily     tacrolimus  2 mg Oral QAM     tacrolimus  2.5 mg Oral QPM     tamsulosin  0.4 mg Oral QPM     warfarin ANTICOAGULANT  7.5 mg Oral ONCE at 18:00     Warfarin Therapy Reminder  1 each Oral See Admin Instructions             Physical Exam:   Temp: 98.8  F (37.1  C) Temp src: Oral BP: 135/85 Pulse: 84   Resp: 18 SpO2: 95 % O2 Device: Nasal cannula Oxygen Delivery: 2 LPM    Wt Readings from Last 4 Encounters:   03/07/23 70 kg (154 lb 6.4 oz)   12/20/22 73 kg (161 lb)   09/12/22 74.8 kg (165 lb)   06/29/22 72.3 kg (159 lb 8 oz)     Constitutional: awake, alert, cooperative, no apparent distress and appears at stated age, well nourished.   Head, ENT, Eyes, and Neck: Normocephalic, external ears without lesions, moist buccal mucosa without oral thrush or ulcers, tonsils without swelling, erythema, or exudate, good dentition, gums without necrosis or abscesses.   PERRL, EOMI, pink conjunctivae, non-icteric sclera.   Neck supple without rigidity  Neurologic: Patient is moving all extremities without focal deficit   Lungs: CTA bilaterally though scattered expiratory wheeze, no accessory muscle use  CVS: RRR, normal S1/S2, no murmur, PMI was not displaced.   Abdomen: non-tender, non-distended, no masses, no bruit, no shifting dullness, normal BS.   Extremities: no pitting edema of bilateral lower extremities, no ulcers, no swelling or erythema of any of joints and no tenderness to palpation.   Skin: no obvious rash            Data:   No results found for: ACD4    Inflammatory Markers    Recent Labs   Lab Test 03/06/23  1843 06/29/22  1111 10/29/21  0542 10/14/21  0943 10/12/21  1038 10/10/21  1024 10/08/21  0947 10/06/21  1007 10/04/21  0432   SED 22* 29* 87*  --   --   --   --   --   --    CRP  --  17.3* 53.0* 23.0* 17.0* 30.0* 34.0* 35.0* 23.0*     Immune Globulin Studies     Recent Labs   Lab Test 03/07/23  0965  01/03/22  0743 12/30/21  0730 12/15/21  0647 11/17/21  0337 10/15/21  0907 09/07/21  1324 09/07/21  1100   * 502* 446* 365* 198* 265* 363*  363*  --    IGM  --   --   --   --   --   --  51  --    IGE  --   --   --   --   --   --   --  83   IGA  --   --   --   --   --   --  103  --      Metabolic Studies       Recent Labs   Lab Test 03/09/23  0706 03/08/23  2127 03/08/23  1801 03/08/23  1543 03/08/23  1000 03/08/23  0624 03/07/23  1314 03/07/23  0626 03/06/23  1845 03/06/23  1843 03/03/23  0823 02/28/23  0848 12/23/22  0908 12/20/22  0746 11/18/22  0848 11/14/22  0726 12/16/21  1912 12/16/21  1757 12/02/21  2030 12/02/21  1816 10/22/21  1602 10/22/21  1601 10/22/21  0959 10/22/21  0958     --   --   --   --  139  --  140  --  138  --   --   --  149*  --  145   < >  --    < >  --    < > 147*  --  147*   POTASSIUM 3.6  --   --   --   --  4.3  --  4.7  --  4.9  --   --   --  4.0  --  4.4   < >  --    < >  --    < > 3.6  --  3.5   CHLORIDE 108*  --   --   --   --  109*  --  109*  --  105 105 109   < > 111*   < > 106   < >  --    < >  --    < > 109  --  106   CO2 19*  --   --   --   --  19*  --  20*  --  21*  --   --   --  27  --  29   < >  --    < >  --    < > 31  --  34*   ANIONGAP 12  --   --   --   --  11  --  11  --  12  --   --   --  11  --  10   < >  --    < >  --    < > 7  --  7   BUN 16.5  --   --   --   --  18.5  --  20.1*  --  18.6  --   --   --  19.1  --  22.8*   < >  --    < >  --    < > 68*  --  62*   CR 0.93  --   --   --   --  0.95  --  1.10  --  1.10  --   --   --  1.36*  --  1.65*   < >  --    < >  --    < > 1.47*  --  1.54*   GFRESTIMATED >90  --   --   --   --  >90  --  78  --  78  --   --   --  61  --  48*   < >  --    < >  --    < > 53*  --  50*   * 164* 114* 134* 132* 257*   < > 170*   < > 160*  --   --   --  93  --  108*   < >  --    < >  --    < > 148*   < > 92   A1C  --   --   --   --   --   --   --   --   --   --   --   --   --   --   --  6.1*  --   --   --   --    < >  --    --   --    ERIK 8.7  --   --   --   --  8.5*  --  8.3*  --  8.7  --   --   --  9.9  --  10.1*   < >  --    < >  --    < > 8.2*  --  7.9*   PHOS 1.7*  --   --   --   --   --   --   --   --   --   --   --   --   --   --  3.7   < >  --    < >  --    < > 2.8  --   --    MAG 1.1*  --   --   --   --   --   --   --   --   --   --   --   --  1.8  --  1.6*   < >  --    < >  --    < > 2.1  --   --    LACT  --   --   --   --   --   --   --   --   --   --   --   --   --   --   --   --   --  1.6  --  1.7   < >  --    < >  --    CKT  --   --   --   --   --   --   --   --   --   --   --   --   --   --   --   --   --   --   --   --   --  51  --  55    < > = values in this interval not displayed.     Hepatic Studies    Recent Labs   Lab Test 03/06/23  1843 11/14/22 0726 03/02/22  1631 12/23/21  0714 12/12/21  0519 12/10/21  0647 09/30/21  1059 09/19/21  1629   BILITOTAL 0.2 0.3 0.2 0.2 0.2 0.5   < >  --    ALKPHOS 71 101 69 91 104 105   < >  --    ALBUMIN 3.4* 4.6 3.5 2.8* 2.5* 2.5*   < >  --    AST 32 23 24 22 17 19   < >  --    ALT 23 26 32 31 26 24   < >  --    LDH  --   --   --   --   --   --   --  423*    < > = values in this interval not displayed.     Pancreatitis testing    Recent Labs   Lab Test 11/14/22  0726 10/22/21  0407 10/21/21  0354 10/20/21  0308 10/19/21  0338 09/07/21  1324 09/07/21  1100   AMYLASE  --   --   --   --   --   --  32   TRIG 211* 307* 486* 383* 458* 364*  --      Hematology Studies      Recent Labs   Lab Test 03/09/23  0706 03/08/23  0624 03/07/23  0626 03/06/23  1843 12/20/22  0746 09/12/22  0852 03/15/22  0803 03/04/22  1500 11/02/21  1302 11/02/21  1053 11/02/21  0832 11/02/21  0815 11/02/21  0556   WBC 5.3 5.2 6.9 5.0 5.9 7.8   < > 9.8   < > 57.4*  57.4*   < > 37.5* 32.6*   ANEU  --   --  5.7 4.2  --   --   --  9.0*  --  49.9*  --  28.5* 29.7*   ALYM  --   --  0.7* 0.3*  --   --   --  0.4*  --  4.0  --  4.9 1.3   DONALD  --   --  0.5 0.5  --   --   --  0.4  --  1.7*  --  2.3* 0.7   AEOS  --   --   0.0 0.0  --   --   --  0.0  --  0.0  --  0.0 0.0   HGB 10.9* 10.0* 10.7* 10.8* 11.4* 11.0*   < > 11.5*   < > 8.8*   < > 5.8* 8.2*   HCT 35.2* 33.0* 35.4* 34.8* 37.6* 35.1*   < > 35.9*   < > 26.8*   < > 19.6* 27.3*   * 132* 154 123* 215 217   < > 221   < > 244   < > 382 355    < > = values in this interval not displayed.     Clotting Studies    Recent Labs   Lab Test 03/09/23  0706 03/08/23  1017 03/06/23  1843 03/03/23  0823 06/29/22  1111 06/21/22  0811 11/03/21  0407 11/02/21  1053 11/02/21  0927   INR 1.79* 2.09* 2.57* 2.3*   < > 1.07   < > 1.55*  --    PTT  --  106*  --   --   --  31  --  34 32    < > = values in this interval not displayed.     Arterial Blood Gas Testing    Recent Labs   Lab Test 12/01/21  0725 11/22/21  0426 11/22/21  0103 11/21/21  1029 11/14/21  0534 11/11/21  1740 11/07/21  0626 11/06/21  0438 11/05/21  0504 11/05/21  0336 11/04/21  1016   PH  --   --   --   --   --  7.48*  --  7.43 7.49* 7.49* 7.46*   PCO2  --   --   --   --   --  37  --  37 31* 32* 35   PO2  --   --   --   --   --  106*  --  109* 126* 331* 95   HCO3  --   --   --   --   --  28  --  25 24 24 25   O2PER 2 40 40 40   < > 40   < > 40 40 40 40    < > = values in this interval not displayed.      Urine Studies     Recent Labs   Lab Test 11/01/21  1336 10/25/21  1507 10/22/21  1641 10/17/21  1642 10/17/21  1041   URINEPH 5.5 5.5 7.5* 5.0 5.0   NITRITE Negative Negative Negative Negative Negative   LEUKEST Negative Negative Negative Negative Trace*   WBCU 0 2 3 25* 44*     Vancomycin Levels     Recent Labs   Lab Test 03/07/23  0935 10/18/21  0403   VANCOMYCIN 21.3 12.6     Tobramycin levels     No lab results found.    Gentamicin levels    No lab results found.    Tacrolimus levels    Invalid input(s): TACROLIMUS, TAC, TACR  Transplant Immunosuppression Labs Latest Ref Rng & Units 3/9/2023 3/8/2023 3/7/2023 3/6/2023 12/20/2022   Creat 0.67 - 1.17 mg/dL 0.93 0.95 1.10 1.10 1.36(H)   UREA NITROGEN 7 - 30 mg/dL - - - - -    UREA NITROGEN (R) 6.0 - 20.0 mg/dL 16.5 18.5 20.1(H) 18.6 19.1   WBC 4.0 - 11.0 10e3/uL 5.3 5.2 6.9 5.0 5.9   Neutrophil % - - 83 83 -   ANEU 1.6 - 8.3 10e3/uL - - 5.7 4.2 -     Cyclosporine levels    Invalid input(s): CYCLOSPORINE, CYC    Mycophenolate levels    Invalid input(s): MYPA, MYP    Sirolimus levels    Invalid input(s): SIROLIMUS, SIR, RAPA    CSF testing     Recent Labs   Lab Test 10/29/21  1221   CWBC 80*   CNEU 94   CLYM 3   CMONO 4   CRBC 9,000*   CGLU 83*   CTP 97*         Microbiology:  OSH labs:  COVID PCR + CT 21.6  Influenza/ RSV PCR negative   NP RVP negative   Legionella urine ag negative   S.pneumo urine ag negative    3/7/22   CMV PCR blood pending   Sputum culture with filamentous fungi   Fungal sputum cx yeast    Last check of C difficile  C Difficile Toxin B by PCR   Date Value Ref Range Status   02/28/2022 Negative Negative Final     Comment:     A negative result does not exclude actual disease due to C. difficile and may be due to improper collection, handling and storage of the specimen or the number of organisms in the specimen is below the detection limit of the assay.     Virology:  No results found for: H6RES    EBV DNA Copies/mL   Date Value Ref Range Status   03/07/2023 Not Detected Not Detected copies/mL Final   12/20/2022 Not Detected Not Detected copies/mL Final   11/14/2022 Not Detected Not Detected copies/mL Final   09/12/2022 Not Detected Not Detected copies/mL Final   06/20/2022 Not Detected Not Detected copies/mL Final   04/18/2022 Not Detected Not Detected copies/mL Final   03/15/2022 Not Detected Not Detected copies/mL Final   02/10/2022 Not Detected Not Detected copies/mL Final   02/07/2022 Not Detected Not Detected copies/mL Final   01/17/2022 Not Detected Not Detected copies/mL Final   01/07/2022 Not Detected Not Detected copies/mL Final   12/15/2021 Not Detected Not Detected copies/mL Final   11/16/2021 Not Detected Not Detected copies/mL Final       CMV Antibody  IgG   Date Value Ref Range Status   03/07/2023 No detectable antibody. No detectable antibody.  Final   10/15/2021 No detectable antibody. No detectable antibody.  Final   09/07/2021 No detectable antibody. No detectable antibody.  Final       Toxoplasma Antibody IgG   Date Value Ref Range Status   09/07/2021 <3.0 0.0 - 7.1 IU/mL Final     Comment:     Negative- Absence of detectable Toxoplasma gondii IgG antibodies. A negative result does not rule out acute infection.       Imaging:  3/5/23 CTA CHEST OSH   IMPRESSION:   1.  2 small filling defects are seen in the right apical pulmonary artery, which are suspicious for subtle acute pulmonary emboli.   2.  Bilateral interstitial lung disease in the setting of rheumatoid arthritis.  Overall, findings appear less apparent than on 8/30/2021.  However, given the somewhat peripheral nature of this, correlation is recommended for any signs or symptoms of superimposed viral pneumonia such as COVID-19 pneumonia based on CT findings.   3.  No aortic dissection.   4.  Additional chronic findings as described.     ECHO  9/5/21 TTE   Interpretation Summary  Small biventricular size with hyperdynamic biventricular function.  The inferior vena cava was normal in size with preserved respiratory  variability.  No pericardial effusion is present.      Brenda Grijalva MD  [facility]  Contact information available via [facility] Paging/Directory  03/09/2023

## 2023-03-09 NOTE — PROGRESS NOTES
Brief Classical Hematology Note:     Pt was initially seen by hematology team regarding if an anticoagulation change was recommended in a patient with question of PE while on warfarin (Please see note on 3/7 for further details). Because his filling defects on CTA PE were so small and questionable for PE on CTA chest (3/5) we recommended keeping him on warfarin. He had been restarted on home warfarin per primary team. Spoke with medicine, no further questions at the moment.     Pt discussed with Dr. Lara. Hematology will sign off, but please do not hesitate to page if there are further questions.     Juarez Bobo MD   Hematology/Oncology Fellow PGY4  Pager: [phone]

## 2023-03-10 ENCOUNTER — APPOINTMENT (OUTPATIENT)
Dept: PHYSICAL THERAPY | Facility: CLINIC | Age: 58
DRG: 177 | End: 2023-03-10
Attending: PHYSICIAN ASSISTANT
Payer: COMMERCIAL

## 2023-03-10 DIAGNOSIS — Z79.899 ENCOUNTER FOR LONG-TERM (CURRENT) USE OF HIGH-RISK MEDICATION: ICD-10-CM

## 2023-03-10 DIAGNOSIS — Z94.2 LUNG REPLACED BY TRANSPLANT (H): Primary | ICD-10-CM

## 2023-03-10 DIAGNOSIS — D84.9 IMMUNOSUPPRESSED STATUS (H): ICD-10-CM

## 2023-03-10 LAB
1,3 BETA GLUCAN SER-MCNC: 95 PG/ML
ANION GAP SERPL CALCULATED.3IONS-SCNC: 11 MMOL/L (ref 7–15)
BACTERIA SPT CULT: ABNORMAL
BACTERIA SPT CULT: ABNORMAL
BUN SERPL-MCNC: 16.2 MG/DL (ref 6–20)
CALCIUM SERPL-MCNC: 8 MG/DL (ref 8.6–10)
CHLORIDE SERPL-SCNC: 106 MMOL/L (ref 98–107)
CREAT SERPL-MCNC: 0.95 MG/DL (ref 0.67–1.17)
CRP SERPL-MCNC: 53.8 MG/L
D DIMER PPP FEU-MCNC: <0.27 UG/ML FEU (ref 0–0.5)
DEPRECATED HCO3 PLAS-SCNC: 23 MMOL/L (ref 22–29)
DONOR IDENTIFICATION: NORMAL
DQB5: 551
DSA COMMENTS: NORMAL
DSA PRESENT: YES
DSA TEST METHOD: NORMAL
ERYTHROCYTE [DISTWIDTH] IN BLOOD BY AUTOMATED COUNT: 16.1 % (ref 10–15)
GALACTOMANNAN AG SERPL QL IA: POSITIVE
GALACTOMANNAN AG SPEC IA-ACNC: 1.83
GFR SERPL CREATININE-BSD FRML MDRD: >90 ML/MIN/1.73M2
GLUCOSE BLDC GLUCOMTR-MCNC: 109 MG/DL (ref 70–99)
GLUCOSE BLDC GLUCOMTR-MCNC: 122 MG/DL (ref 70–99)
GLUCOSE BLDC GLUCOMTR-MCNC: 208 MG/DL (ref 70–99)
GLUCOSE BLDC GLUCOMTR-MCNC: 223 MG/DL (ref 70–99)
GLUCOSE SERPL-MCNC: 129 MG/DL (ref 70–99)
GRAM STAIN RESULT: ABNORMAL
HCT VFR BLD AUTO: 34.4 % (ref 40–53)
HGB BLD-MCNC: 10.5 G/DL (ref 13.3–17.7)
INR PPP: 2.23 (ref 0.85–1.15)
MAGNESIUM SERPL-MCNC: 2.1 MG/DL (ref 1.7–2.3)
MCH RBC QN AUTO: 24.9 PG (ref 26.5–33)
MCHC RBC AUTO-ENTMCNC: 30.5 G/DL (ref 31.5–36.5)
MCV RBC AUTO: 82 FL (ref 78–100)
OBSERVATION IMP: POSITIVE
ORGAN: NORMAL
PHOSPHATE SERPL-MCNC: 3.8 MG/DL (ref 2.5–4.5)
PLATELET # BLD AUTO: 119 10E3/UL (ref 150–450)
POTASSIUM SERPL-SCNC: 4.2 MMOL/L (ref 3.4–5.3)
RBC # BLD AUTO: 4.22 10E6/UL (ref 4.4–5.9)
SA 1 CELL: NORMAL
SA 1 TEST METHOD: NORMAL
SA 2 CELL: NORMAL
SA 2 TEST METHOD: NORMAL
SA1 HI RISK ABY: NORMAL
SA1 MOD RISK ABY: NORMAL
SA2 HI RISK ABY: NORMAL
SA2 MOD RISK ABY: NORMAL
SODIUM SERPL-SCNC: 140 MMOL/L (ref 136–145)
TACROLIMUS BLD-MCNC: 11.3 UG/L (ref 5–15)
TME LAST DOSE: NORMAL H
TME LAST DOSE: NORMAL H
UFH PPP CHRO-ACNC: 0.67 IU/ML
UFH PPP CHRO-ACNC: 0.7 IU/ML
UFH PPP CHRO-ACNC: 0.72 IU/ML
UNACCEPTABLE ANTIGENS: NORMAL
UNOS CPRA: 26
WBC # BLD AUTO: 5 10E3/UL (ref 4–11)
ZZZSA 1  COMMENTS: NORMAL
ZZZSA 2 COMMENTS: NORMAL

## 2023-03-10 PROCEDURE — 99231 SBSQ HOSP IP/OBS SF/LOW 25: CPT | Mod: GC | Performed by: INTERNAL MEDICINE

## 2023-03-10 PROCEDURE — 84100 ASSAY OF PHOSPHORUS: CPT

## 2023-03-10 PROCEDURE — 250N000009 HC RX 250: Performed by: PHYSICIAN ASSISTANT

## 2023-03-10 PROCEDURE — 83735 ASSAY OF MAGNESIUM: CPT

## 2023-03-10 PROCEDURE — 258N000003 HC RX IP 258 OP 636: Performed by: STUDENT IN AN ORGANIZED HEALTH CARE EDUCATION/TRAINING PROGRAM

## 2023-03-10 PROCEDURE — 94667 MNPJ CHEST WALL 1ST: CPT

## 2023-03-10 PROCEDURE — 250N000013 HC RX MED GY IP 250 OP 250 PS 637

## 2023-03-10 PROCEDURE — 36415 COLL VENOUS BLD VENIPUNCTURE: CPT | Performed by: INTERNAL MEDICINE

## 2023-03-10 PROCEDURE — 250N000012 HC RX MED GY IP 250 OP 636 PS 637: Performed by: STUDENT IN AN ORGANIZED HEALTH CARE EDUCATION/TRAINING PROGRAM

## 2023-03-10 PROCEDURE — 80197 ASSAY OF TACROLIMUS: CPT | Performed by: PHYSICIAN ASSISTANT

## 2023-03-10 PROCEDURE — 99233 SBSQ HOSP IP/OBS HIGH 50: CPT | Mod: GC | Performed by: STUDENT IN AN ORGANIZED HEALTH CARE EDUCATION/TRAINING PROGRAM

## 2023-03-10 PROCEDURE — 250N000013 HC RX MED GY IP 250 OP 250 PS 637: Performed by: STUDENT IN AN ORGANIZED HEALTH CARE EDUCATION/TRAINING PROGRAM

## 2023-03-10 PROCEDURE — 86140 C-REACTIVE PROTEIN: CPT | Performed by: STUDENT IN AN ORGANIZED HEALTH CARE EDUCATION/TRAINING PROGRAM

## 2023-03-10 PROCEDURE — 85041 AUTOMATED RBC COUNT: CPT | Performed by: STUDENT IN AN ORGANIZED HEALTH CARE EDUCATION/TRAINING PROGRAM

## 2023-03-10 PROCEDURE — 250N000009 HC RX 250: Performed by: INTERNAL MEDICINE

## 2023-03-10 PROCEDURE — 85610 PROTHROMBIN TIME: CPT | Performed by: STUDENT IN AN ORGANIZED HEALTH CARE EDUCATION/TRAINING PROGRAM

## 2023-03-10 PROCEDURE — 94640 AIRWAY INHALATION TREATMENT: CPT | Mod: 76

## 2023-03-10 PROCEDURE — 999N000128 HC STATISTIC PERIPHERAL IV START W/O US GUIDANCE

## 2023-03-10 PROCEDURE — 97110 THERAPEUTIC EXERCISES: CPT | Mod: GP

## 2023-03-10 PROCEDURE — 99233 SBSQ HOSP IP/OBS HIGH 50: CPT | Mod: FS | Performed by: INTERNAL MEDICINE

## 2023-03-10 PROCEDURE — 94640 AIRWAY INHALATION TREATMENT: CPT

## 2023-03-10 PROCEDURE — 999N000157 HC STATISTIC RCP TIME EA 10 MIN

## 2023-03-10 PROCEDURE — 85520 HEPARIN ASSAY: CPT | Performed by: INTERNAL MEDICINE

## 2023-03-10 PROCEDURE — 82310 ASSAY OF CALCIUM: CPT | Performed by: STUDENT IN AN ORGANIZED HEALTH CARE EDUCATION/TRAINING PROGRAM

## 2023-03-10 PROCEDURE — 94668 MNPJ CHEST WALL SBSQ: CPT

## 2023-03-10 PROCEDURE — 250N000009 HC RX 250: Performed by: STUDENT IN AN ORGANIZED HEALTH CARE EDUCATION/TRAINING PROGRAM

## 2023-03-10 PROCEDURE — 94660 CPAP INITIATION&MGMT: CPT

## 2023-03-10 PROCEDURE — 250N000012 HC RX MED GY IP 250 OP 636 PS 637: Performed by: PHYSICIAN ASSISTANT

## 2023-03-10 PROCEDURE — 85379 FIBRIN DEGRADATION QUANT: CPT | Performed by: STUDENT IN AN ORGANIZED HEALTH CARE EDUCATION/TRAINING PROGRAM

## 2023-03-10 PROCEDURE — 36415 COLL VENOUS BLD VENIPUNCTURE: CPT | Performed by: PHYSICIAN ASSISTANT

## 2023-03-10 PROCEDURE — 85520 HEPARIN ASSAY: CPT | Performed by: STUDENT IN AN ORGANIZED HEALTH CARE EDUCATION/TRAINING PROGRAM

## 2023-03-10 PROCEDURE — 214N000001 HC R&B CCU UMMC

## 2023-03-10 PROCEDURE — 97161 PT EVAL LOW COMPLEX 20 MIN: CPT | Mod: GP

## 2023-03-10 RX ORDER — WARFARIN SODIUM 1 MG/1
2 TABLET ORAL
Status: COMPLETED | OUTPATIENT
Start: 2023-03-10 | End: 2023-03-10

## 2023-03-10 RX ORDER — SODIUM CHLORIDE FOR INHALATION 3 %
4 VIAL, NEBULIZER (ML) INHALATION 3 TIMES DAILY
Status: DISCONTINUED | OUTPATIENT
Start: 2023-03-10 | End: 2023-03-13

## 2023-03-10 RX ADMIN — LAMOTRIGINE 50 MG: 25 TABLET ORAL at 20:59

## 2023-03-10 RX ADMIN — SULFAMETHOXAZOLE AND TRIMETHOPRIM 1 TABLET: 400; 80 TABLET ORAL at 08:43

## 2023-03-10 RX ADMIN — FLUOXETINE 20 MG: 20 CAPSULE ORAL at 08:43

## 2023-03-10 RX ADMIN — LEVALBUTEROL HYDROCHLORIDE 1.25 MG: 1.25 SOLUTION RESPIRATORY (INHALATION) at 21:16

## 2023-03-10 RX ADMIN — WARFARIN SODIUM 2 MG: 1 TABLET ORAL at 18:13

## 2023-03-10 RX ADMIN — TACROLIMUS 1.5 MG: 1 CAPSULE ORAL at 18:13

## 2023-03-10 RX ADMIN — TACROLIMUS 1 MG: 1 CAPSULE ORAL at 08:44

## 2023-03-10 RX ADMIN — PREDNISONE 5 MG: 5 TABLET ORAL at 08:43

## 2023-03-10 RX ADMIN — VORICONAZOLE 400 MG: 200 TABLET ORAL at 08:44

## 2023-03-10 RX ADMIN — SODIUM CHLORIDE SOLN NEBU 3% 4 ML: 3 NEBU SOLN at 21:16

## 2023-03-10 RX ADMIN — LEVALBUTEROL HYDROCHLORIDE 1.25 MG: 1.25 SOLUTION RESPIRATORY (INHALATION) at 15:36

## 2023-03-10 RX ADMIN — TAMSULOSIN HYDROCHLORIDE 0.4 MG: 0.4 CAPSULE ORAL at 18:13

## 2023-03-10 RX ADMIN — POTASSIUM PHOSPHATE, MONOBASIC AND POTASSIUM PHOSPHATE, DIBASIC 9 MMOL: 224; 236 INJECTION, SOLUTION, CONCENTRATE INTRAVENOUS at 01:00

## 2023-03-10 RX ADMIN — LEVOTHYROXINE SODIUM 25 MCG: 0.03 TABLET ORAL at 08:43

## 2023-03-10 RX ADMIN — MIRTAZAPINE 7.5 MG: 7.5 TABLET, FILM COATED ORAL at 21:00

## 2023-03-10 RX ADMIN — SODIUM CHLORIDE SOLN NEBU 3% 4 ML: 3 NEBU SOLN at 15:37

## 2023-03-10 RX ADMIN — AMLODIPINE BESYLATE 10 MG: 10 TABLET ORAL at 08:43

## 2023-03-10 RX ADMIN — LEVALBUTEROL HYDROCHLORIDE 1.25 MG: 1.25 SOLUTION RESPIRATORY (INHALATION) at 09:32

## 2023-03-10 RX ADMIN — PREDNISONE 2.5 MG: 2.5 TABLET ORAL at 18:13

## 2023-03-10 RX ADMIN — VORICONAZOLE 250 MG: 200 TABLET ORAL at 21:00

## 2023-03-10 RX ADMIN — PANTOPRAZOLE SODIUM 40 MG: 40 TABLET, DELAYED RELEASE ORAL at 08:44

## 2023-03-10 ASSESSMENT — ACTIVITIES OF DAILY LIVING (ADL)
ADLS_ACUITY_SCORE: 30
ADLS_ACUITY_SCORE: 29
ADLS_ACUITY_SCORE: 30
ADLS_ACUITY_SCORE: 30
ADLS_ACUITY_SCORE: 29

## 2023-03-10 NOTE — PROGRESS NOTES
Pulmonary Medicine  Cystic Fibrosis - Lung Transplant Team  Progress Note  03/10/2023       Patient: Edson Thornton Jr.  MRN: [id number]  : 1965 (age 57 year old)  Transplant: 10/16/2021 (Lung), POD#510  Admission date: 3/6/2023    Assessment & Plan:     Edson Thornton Jr. is a 57 year old male with a history of BSLT for NSIP/ILD (10/16/2021), bronchiectasis, moderate PH, RA, SALTY, chronic HSV infection, hypogammaglobulinemia, steroid-induced diabetes, hypothyroidism, PFO, HTN, HLD, duodenal anomaly, anxiety, and depression.  Post-op course complicated by encephalopathy and diffuse weakness, acute to subacute CVA, afib with RVR, DVT, BRENNAN, GI bleed, and Candidemia/Candida empyema.  The patient was admitted from OSH on 3/6/2023 for acute hypoxic respiratory failure in setting of COVID infection and PE.  Weaned to RA during the day 3/6 and overnight (with BiPAP adjustments) 3/8.       Today's recommendations:  - Could consider light diuresis  - Starting TID 3% HTS (levalbuterol premed) and CPT to see if this helps mobilize secretions  - Would recommend discharging him on 3% HTS and levalbuterol TID with a flutter valve   - Exercise oximetry to see if he needs oxygen prior to discharge  - Will follow tacro level today it's 11.3 but not steady state on fully reduced dose, will need close follow up in outpatient (have messaged coordinators) would recommend discharging on 1.5 mg in am and 1 mg in pm and would discharge OFF myfortic until follow up    - Will try to reschedule pulmonary follow up for ~2 weeks from discharge (around this time repeat chest CT without contrast, revisit resuming Myfortic, and repeat CMV for close monitoring)  - DSA (3/7) pending  - IgG to be redrawn       Acute hypoxic respiratory failure:  COVID infection:   Initial COVID infection on , some mild improvement and then represented and received steroids and 2 weeks of levaquin without improvement. Represented on 3/5 with  increased MYERS and hypoxia requiring 4LNC and COVID CT was 20.2 on 3/9. S/p another 5 days of remdesivir at [facility] finishing on 3/9. Sputum growing aspergillus and rothia mucilaginosa and started on voriconazole. Oxygen down to 1-2L pior to discharge. CT chest with evidence of peripheral nodular opacities which are felt to represent COVID but may also be post COVID . Will need close follow up imaging on discharge with primary txp provider. .    - Bacterial sputum culture (3/8) with Aspergillus fumigatus and rothia mucilaginosa,  - Fungal sputum culture (3/8) with yeast, follow  - BDG fungitell (drawn after IVIG) and Aspergillus galactomannan (3/8- drawn before IVIG) pending  - Voriconazole started 3/9 per TxpID  - Remdesivir through 3/9 per transplant ID  - Nebs: Xopenex TID  - Aerobika and IS hourly while awake  - Supplemental oxygen as needed to maintain SpO2 >92%, exercise oximetry study 24-48h prior to discharge  - Repeat CT chest without contrast in ~2 weeks (around time of pulmonary follow up)  - AC management per hematology and primary team  - PT consult     Concern for PE: Initial scan done at OSH with two small RUL filling defects, but in review with radiology (Dr. Lindsay) here, it is unclear that this is not artifact given type of scanner used. He has been therapeutic on warfarin prior to admission and this is not the cause of his hypoxia. LE dopplers negative  - Anticoagulation per primary  - If acute increase in oxygenation or tachycardia without clear etiology would repeat CTA flash for definitive evaluation of PE presence     S/p bilateral sequential lung transplant (BSLT) for NSIP/ILD: Seen in pulmonary clinic 12/20/22 with intermittent dyspnea, PFTs with marked improvement and no acute changes on CXR.  EBV negative 3/7.  - Pulmonary follow up currently scheduled 3/28 with repeat CT and resumption of myfortic consideration along with IgG     Immunosuppression:  - Tacrolimus 1.5 mg qAM / 1 mg qPM.  Goal  level 8-10.  Level of 11.3 but dose reduced and not steady state due to vori start  - Myfortic held 3/5 in setting of COVID (prior dose 720 mg BID), revisit resuming in ~2 weeks in outpatient, discharge off it.   - Prednisone 5 mg qAM / 2.5 mg qPM     Prophylaxis:   - Bactrim for PJP ppx  - CMV D-/R-, although with recent CMV <35 on 1/27 (previously not detected) which raises the question of seroconversion (will keep this in mind if started on increased steroids in future), repeat IgG and quant negative 3/7, repeat CMV in ~2 weeks      Positive crossmatch:   Positive DSA: Had a retrospective positive crossmatch following transplantation, attributed to receiving rituximab.  DSA initially positive 11/29/21.  Most recently remains positive (DQ:5) with mfi 1533 on 12/20/22 from prior mfi 891 on 11/14/22 and 1681 on 9/12/22.  - DSA (3/7) pending     Hypogammaglobulinemia: IgG adequate at 502 on 1/3.  Repeat IgG low at 168 on 3/7, s/p IVIG on 3/8.   - Repeat IgG 4/8 (not yet ordered)     SALTY: Continue PTA NIPPV overnight (see RT note 3/9 for details).    We appreciate the excellent care provided by the [place] [id number] team.  Recommendations communicated via telephone and this note.  Will continue to follow along closely, please do not hesitate to call with any questions or concerns.    Janee Johns MD  Pulmonary Transplant/CF Attending  Pager: [phone]       Subjective & Interval History:     No acute issues overnight other than concern for PIV infiltration. Mid to high 90s on 2L, this morning on room air and feeling less fatigued. Still having significant sputum production and looks very thick and yellow. Will trial 3% HTS today to thin out mucus. No fevers or chills and appetite improved. MYERS still about the same but he feels overall less tired. He wants to get out of the hospital.     Review of Systems:     C: No chills,  INTEGUMENTARY/SKIN: No rash or obvious new lesions  ENT/MOUTH: + dry throat, no sinus pain,  "no nasal congestion or drainage  RESP: See interval history  CV: No palpitations, no peripheral edema  GI: No nausea, no vomiting, no change in stools, + intermittent reflux symptoms  : No dysuria  MUSCULOSKELETAL: No myalgias, no arthralgias  ENDOCRINE: + blood sugars elevated  NEURO: No headache, no new numbness or tingling  PSYCHIATRIC: Mood stable    Physical Exam:     All notes, images, and labs from past 24 hours (at minimum) were reviewed.    Vital signs:  Temp: 98  F (36.7  C) Temp src: Axillary BP: 131/84 Pulse: 70   Resp: 18 SpO2: 99 % O2 Device: BiPAP/CPAP Oxygen Delivery: 2 LPM Height: 162.6 cm (5' 4\") Weight: 70 kg (154 lb 6.4 oz)  I/O:       Intake/Output Summary (Last 24 hours) at 3/10/2023 1413  Last data filed at 3/10/2023 1000  Gross per 24 hour   Intake 675.1 ml   Output 875 ml   Net -199.9 ml       Constitutional: Sitting up in bed, in no apparent distress.   HEENT: Eyes with pink conjunctivae, anicteric.  Oral mucosa moist without lesions.   PULM: Fair air flow bilaterally.  Scattered crackles no wheezing today  No rhonchi.  Non-labored breathing on RA.  CV: Normal S1 and S2.  RRR.  No murmur, gallop, or rub.  No peripheral edema.   ABD: NABS, soft, nontender, nondistended.    MSK: Moves all extremities.  No apparent muscle wasting.   NEURO: Sleepy although easily wakes to voice, conversant and answering questions appropriately.   SKIN: Warm, dry.  No rash on limited exam.   PSYCH: Mood stable.     Lines, Drains, and Devices:  Peripheral IV 03/06/23 Anterior;Right Upper forearm (Active)   Site Assessment WDL 03/09/23 0417   Line Status Other (Comment) 03/09/23 0417   Phlebitis Scale 0-->no symptoms 03/09/23 0417   Infiltration Scale 0 03/09/23 0417   Number of days: 3       Peripheral IV 03/07/23 Left;Posterior Lower forearm (Active)   Site Assessment WDL 03/09/23 0417   Line Status Infusing 03/09/23 0417   Phlebitis Scale 0-->no symptoms 03/09/23 0417   Infiltration Scale 0 03/09/23 0417 "   Number of days: 2     Data:     LABS    CMP:   Recent Labs   Lab 03/09/23 2126 03/09/23 2120 03/09/23  1643 03/09/23  1524 03/09/23  1414 03/09/23  0706 03/08/23  1000 03/08/23  0624 03/07/23  1314 03/07/23  0626 03/06/23  1845 03/06/23  1843   NA  --   --   --   --   --  139  --  139  --  140  --  138   POTASSIUM  --   --   --   --   --  3.6  --  4.3  --  4.7  --  4.9   CHLORIDE  --   --   --   --   --  108*  --  109*  --  109*  --  105   CO2  --   --   --   --   --  19*  --  19*  --  20*  --  21*   ANIONGAP  --   --   --   --   --  12  --  11  --  11  --  12   *  --  166*  --  126* 191*   < > 257*   < > 170*   < > 160*   BUN  --   --   --   --   --  16.5  --  18.5  --  20.1*  --  18.6   CR  --   --   --   --   --  0.93  --  0.95  --  1.10  --  1.10   GFRESTIMATED  --   --   --   --   --  >90  --  >90  --  78  --  78   ERIK  --   --   --   --   --  8.7  --  8.5*  --  8.3*  --  8.7   MAG  --   --   --  2.3  --  1.1*  --   --   --   --   --   --    PHOS  --  2.4*  --   --   --  1.7*  --   --   --   --   --   --    PROTTOTAL  --   --   --   --   --   --   --   --   --   --   --  5.4*   ALBUMIN  --   --   --   --   --   --   --   --   --   --   --  3.4*   BILITOTAL  --   --   --   --   --   --   --   --   --   --   --  0.2   ALKPHOS  --   --   --   --   --   --   --   --   --   --   --  71   AST  --   --   --   --   --   --   --   --   --   --   --  32   ALT  --   --   --   --   --   --   --   --   --   --   --  23    < > = values in this interval not displayed.     CBC:   Recent Labs   Lab 03/10/23  0717 03/09/23  0706 03/08/23  0624 03/07/23  0626   WBC 5.0 5.3 5.2 6.9   RBC 4.22* 4.36* 4.01* 4.29*   HGB 10.5* 10.9* 10.0* 10.7*   HCT 34.4* 35.2* 33.0* 35.4*   MCV 82 81 82 83   MCH 24.9* 25.0* 24.9* 24.9*   MCHC 30.5* 31.0* 30.3* 30.2*   RDW 16.1* 15.9* 15.9* 15.9*   * 127* 132* 154       INR:   Recent Labs   Lab 03/10/23  0717 03/09/23  0706 03/08/23  1017 03/06/23  1843   INR 2.23* 1.79* 2.09* 2.57*        Glucose:   Recent Labs   Lab 03/09/23  2126 03/09/23  1643 03/09/23  1414 03/09/23  0706 03/08/23  2127 03/08/23  1801   * 166* 126* 191* 164* 114*       Blood Gas: No lab results found in last 7 days.    Culture Data No results for input(s): CULT in the last 168 hours.    Virology Data:   Lab Results   Component Value Date    FLUAH1 Not Detected 06/21/2022    FLUAH3 Not Detected 06/21/2022    IR9876 Not Detected 06/21/2022    IFLUB Not Detected 06/21/2022    RSVA Not Detected 06/21/2022    RSVB Not Detected 06/21/2022    PIV1 Not Detected 06/21/2022    PIV2 Not Detected 06/21/2022    PIV3 Not Detected 06/21/2022    HMPV Not Detected 06/21/2022    HRVS Negative 04/19/2022    ADVBE Negative 04/19/2022    ADVC Negative 04/19/2022    ADVC Negative 11/22/2021    ADVC Negative 11/12/2021       Historical CMV results (last 3 of prior testing):  Lab Results   Component Value Date    CMVQNT Not Detected 03/07/2023    CMVQNT Not Detected 11/14/2022    CMVQNT Not Detected 09/12/2022     No results found for: CMVLOG    Urine Studies    Recent Labs   Lab Test 11/01/21  1336 10/25/21  1507   URINEPH 5.5 5.5   NITRITE Negative Negative   LEUKEST Negative Negative   WBCU 0 2       Most Recent [place] Pulmonary Function Testing (FVC/FEV1 only)  FVC-Pre   Date Value Ref Range Status   12/20/2022 2.46 L    11/14/2022 2.12 L    09/12/2022 2.27 L    06/20/2022 2.14 L      FVC-%Pred-Pre   Date Value Ref Range Status   12/20/2022 68 %    11/14/2022 54 %    09/12/2022 57 %    06/20/2022 54 %      FEV1-Pre   Date Value Ref Range Status   12/20/2022 1.61 L    11/14/2022 1.22 L    09/12/2022 1.42 L    06/20/2022 1.29 L      FEV1-%Pred-Pre   Date Value Ref Range Status   12/20/2022 56 %    11/14/2022 39 %    09/12/2022 45 %    06/20/2022 41 %        IMAGING    Recent Results (from the past 48 hour(s))   US Lower Extremity Venous Duplex Bilateral    Narrative    EXAMINATION: US LOWER EXTREMITY VENOUS DUPLEX BILATERAL  3/8/2023  5:26  PM      CLINICAL HISTORY: PE    COMPARISON: None        PROCEDURE COMMENTS: Ultrasound was performed of the deep venous system  of the right and left lower extremity using grayscale, color, and  spectral Doppler.    FINDINGS:  The common femoral, greater saphenous origin, femoral, popliteal, and  deep calf veins are visualized and are patent. Venous waveforms are  normal. There is normal response to compression.      Impression    IMPRESSION:.  No deep vein thrombosis in the right or left lower extremity.    I have personally reviewed the examination and initial interpretation  and I agree with the findings.    MAURICIO NAVAS MD         SYSTEM ID:  [id number]

## 2023-03-10 NOTE — PLAN OF CARE
"Goal Outcome Evaluation:    /80 (BP Location: Right arm, Patient Position: Semi-Gaines's, Cuff Size: Adult Regular)   Pulse 86   Temp 98.6  F (37  C) (Oral)   Resp 18   Ht 1.626 m (5' 4\")   Wt 70 kg (154 lb 6.4 oz)   SpO2 96%   BMI 26.50 kg/m      [id number]    Admitted on 03/06/23 due to acute hypoxic respiratory failure 2/2 COVID-19. Hx of bilateral lung transplant in 10/2021. On 2LNC sating >92%, mild SOB and MYERS per patient report. Expiratory wheezing on bilateral upper lobe. Diminished lower lobes. Infrequent productive cough. CPAP at night. Independent in his room. PIV x 2, infusing Hep drip, Hep drip therapeutic this shift, VS stable and afebrile. ACHS blood glucose 150 mg/dl before @10:00 pm. One incontinent bm this shift. Voiding spontaneously without difficulties. Will continue to monitor.   "

## 2023-03-10 NOTE — PROGRESS NOTES
[facility]    Transplant Infectious Diseases Inpatient Progress Note       Edson Thornton Jr. MRN# [id number]   YOB: 1965 Age: 57 year old   Date of Admission and time: 3/6/2023  3:53 PM            Recommendations:   - Continue PO Voriconazole 300mg BID (4mg/kg) for 12 weeks total (through 6/2/23)   - Please get Voriconazole trough level after 5d (3/14)   - Augmentin 875mg BID for 5d to cover the Rothia   - Weekly LFTs   - Patient does not require Transplant ID follow up but we are happy to see the patient in follow up, if needed at the request of Transplant Pulmonology         Summary of Presentation:   Transplants:  10/16/2021 (Lung), Postoperative day:  510     This patient is a 57 year old man with a PMH of b/l lung transplant 10/16/2021 2/2 NSIP on Tac/MMF/Pred, RA who presents with persistent respiratory sx after initial COVID19 pneumonia 2/16/23.     2/16/23 developed acute onset SOB, MYERS, cough, fevers and was found to be positive for COVID. Reports that his entire household was sick at that time and many tested positive for COVID. He was seen by his PCP and it was arranged for him to receive Remdesivir infusions x3d at a local infusion center. States that with this he had started to feel a bit better but as soon as tx was completed his sx started to worsen again. He had persistent MYERS, cough, malaise and so he was additionally prescribed a 5d course of Prednisone 20mg on 2/27 and a 14d course of Levofloxicin on 2/28. Despite these treatments he continued to have symptoms which prompted him to present to OSH 3/5 for evaluation.     At the OSH he was hypoxic requiring 2L supplemental O2. His COVID PCR remained positive with CT 21.6. CT revealing of small R apical PE. ID was consulted and he was started on Remdesivir and Dexamethasone. His case was discussed with [facility] Transplant Pulmonology who recommended holding his MMF. Ultimately he was transferred to [facility] for  Transplant Pulmonology and Transplant ID consultation. Sputum eventually grew Aspergillus fumigatus and decision was made to start him on empiric Voriconazole. Sputum also eventually grew Rothia mucilaginosa which is an anaerobic oral jean marie, though unlikely to be causing significant clinical disease we can cover with a 5d course of Augmentin.           Active Problems and Infectious Diseases Issues:   COVID19 Pneumonia   H/o b/l lung transplant 10/16/2021 on Tac/MMF/Pred   Most likely this is reactivation of COVID viral replication in the setting of lymphopenia and immunosuppression. Given he initially improved with 3d of Remdesivir and then worsened after discontinuation, potentially this was not a long enough treatment course for him. There is also potential that his outpatient steroid course contributed as, at least in studies, non-hypoxic COVID patients who received steroids tended towards worse outcomes. His cycle threshold of 21.6 also further supports this and his imaging is c/w with COVID pneumonia. We feel that bacterial superinfection is less likely in his case, he has been afebrile, stable on RA, procal of 0.07 without clear radiographical e/o bacterial PNA and would favor discontinuation of his Vancomycin and Cefepime. In addition he symptomatically worsened while on Levofloxacin (which has similar antimicrobial spectrum to Cefepime) further supporting this.      Aspergillus fumigatus on sputum culture   CT does have a slight nodular appearance with could be c/w invasive disease. Unfortunately the BD glucan and Aspergillus galactomannan were drawn about 1H after administration of IVIG which commonly falsely elevates these tests so they will be difficult to interpret. In that setting favor starting empiric therapy with Voriconazole. At this time he is stable and we do not need to bridge with IV treatment until the Voriconazole is therapeutic. Please check a trough after 5d and continue for 12 weeks total.          Old Problems and Infectious Diseases Issues:   - Historical pulmonary infections 11/2021 with Klebsiella pneumoniae, Pseudomonas putida (R- Meropenem otherwise sensitive); 6/2022 nonsporulating saprophytic fungus. Recurrent respiratory colonization with Candida albicans.     Other Infectious Disease issues include:  - QTc: 424 as of 3/6/2023  - PJP prophylaxis: Bactrim  - Serostatus: CMV D-/R-, EBV D+/R+, HSV1+/2+, VZV+  - Immunization status: This patient received the (reported per patient - out of state records) 4th dose of the COVID-19 vaccine (bivalent).  - Gamma globulin status: 502 on 1/3    Thank you very much Dr. Rubio for involving me in the care of Mr. Edson Thornton . Please do not hesitate to call me for any question.     Transplant ID will sign off, please page with questions     This patient was discussed with the attending physician, Dr Abebe            Interval History:   Transplants:  10/16/2021 (Lung), Postoperative day:  510     NAEO. Feeling slightly better today. Breathing is about the same but less fatigued. He continues to have cough which he describes as yellowish/green in color. Denies fevers, chills, sweats.               Review of Systems:      As mentioned in the HPI otherwise negative by reviewing constitutional symptoms, central and peripheral neurological systems, respiratory system, cardiac system, GI system,  system, musculoskeletal, skin, allergy, and lymphatics.               Medications:   Medications that Require Transfusion:     heparin 850 Units/hr (03/10/23 0830)     - MEDICATION INSTRUCTIONS -       Scheduled Medications:     amLODIPine  10 mg Oral Daily     FLUoxetine  20 mg Oral Daily     insulin aspart  1-3 Units Subcutaneous TID AC     insulin aspart  1-3 Units Subcutaneous At Bedtime     lamoTRIgine  50 mg Oral QPM     levalbuterol  1.25 mg Nebulization TID     levothyroxine  25 mcg Oral QAM AC     mirtazapine  7.5 mg Oral At Bedtime     pantoprazole  40 mg  Oral QAM AC     predniSONE  2.5 mg Oral QPM     predniSONE  5 mg Oral QAM     sodium chloride  4 mL Nebulization TID     sodium chloride (PF)  3 mL Intracatheter Q8H     sodium chloride (PF)  3 mL Intracatheter Q8H     sulfamethoxazole-trimethoprim  1 tablet Oral Daily     tacrolimus  1 mg Oral QAM     tacrolimus  1.5 mg Oral QPM     tamsulosin  0.4 mg Oral QPM     voriconazole  250 mg Oral Q12H [facility] (08/20)     warfarin ANTICOAGULANT  2 mg Oral ONCE at 18:00     Warfarin Therapy Reminder  1 each Oral See Admin Instructions             Physical Exam:   Temp: 98.2  F (36.8  C) Temp src: Oral BP: (!) 149/78 Pulse: 100   Resp: 20 SpO2: 94 % O2 Device: None (Room air) Oxygen Delivery: 2 LPM    Wt Readings from Last 4 Encounters:   03/07/23 70 kg (154 lb 6.4 oz)   12/20/22 73 kg (161 lb)   09/12/22 74.8 kg (165 lb)   06/29/22 72.3 kg (159 lb 8 oz)     Constitutional: awake, alert, cooperative, no apparent distress and appears at stated age, well nourished.   Head, ENT, Eyes, and Neck: Normocephalic, external ears without lesions, moist buccal mucosa without oral thrush or ulcers, tonsils without swelling, erythema, or exudate, good dentition, gums without necrosis or abscesses.   PERRL, EOMI, pink conjunctivae, non-icteric sclera.   Neck supple without rigidity  Neurologic: Patient is moving all extremities without focal deficit   Lungs: CTA bilaterally, no accessory muscle use  CVS: RRR, normal S1/S2, no murmur, PMI was not displaced.   Abdomen: non-tender, non-distended, no masses, no bruit, no shifting dullness, normal BS.   Extremities: no pitting edema of bilateral lower extremities, no ulcers, no swelling or erythema of any of joints and no tenderness to palpation.   Skin: no obvious rash            Data:   No results found for: ACD4    Inflammatory Markers    Recent Labs   Lab Test 03/06/23  1843 06/29/22  1111 10/29/21  0542 10/14/21  0943 10/12/21  1038 10/10/21  1024 10/08/21  0947 10/06/21  1007 10/04/21  0432    SED 22* 29* 87*  --   --   --   --   --   --    CRP  --  17.3* 53.0* 23.0* 17.0* 30.0* 34.0* 35.0* 23.0*     Immune Globulin Studies     Recent Labs   Lab Test 03/07/23  0935 01/03/22  0743 12/30/21  0730 12/15/21  0647 11/17/21  0337 10/15/21  0907 09/07/21  1324 09/07/21  1100   * 502* 446* 365* 198* 265* 363*  363*  --    IGM  --   --   --   --   --   --  51  --    IGE  --   --   --   --   --   --   --  83   IGA  --   --   --   --   --   --  103  --      Metabolic Studies       Recent Labs   Lab Test 03/10/23  1347 03/10/23  1212 03/10/23  0717 03/09/23  2126 03/09/23  2120 03/09/23  1643 03/09/23  1524 03/09/23  1414 03/09/23  0706 03/08/23  1000 03/08/23  0624 03/07/23  1314 03/07/23  0626 03/06/23  1845 03/06/23  1843 03/03/23  0823 12/23/22  0908 12/20/22  0746 11/18/22  0848 11/14/22  0726 12/16/21  1912 12/16/21  1757 12/02/21  2030 12/02/21  1816 10/22/21  1602 10/22/21  1601 10/22/21  0959 10/22/21  0958   NA  --   --  140  --   --   --   --   --  139  --  139  --  140  --  138  --   --  149*  --  145   < >  --    < >  --    < > 147*  --  147*   POTASSIUM  --   --  4.2  --   --   --   --   --  3.6  --  4.3  --  4.7  --  4.9  --   --  4.0  --  4.4   < >  --    < >  --    < > 3.6  --  3.5   CHLORIDE  --   --  106  --   --   --   --   --  108*  --  109*  --  109*  --  105 105   < > 111*   < > 106   < >  --    < >  --    < > 109  --  106   CO2  --   --  23  --   --   --   --   --  19*  --  19*  --  20*  --  21*  --   --  27  --  29   < >  --    < >  --    < > 31  --  34*   ANIONGAP  --   --  11  --   --   --   --   --  12  --  11  --  11  --  12  --   --  11  --  10   < >  --    < >  --    < > 7  --  7   BUN  --   --  16.2  --   --   --   --   --  16.5  --  18.5  --  20.1*  --  18.6  --   --  19.1  --  22.8*   < >  --    < >  --    < > 68*  --  62*   CR  --   --  0.95  --   --   --   --   --  0.93  --  0.95  --  1.10  --  1.10  --   --  1.36*  --  1.65*   < >  --    < >  --    < > 1.47*  --  1.54*    GFRESTIMATED  --   --  >90  --   --   --   --   --  >90  --  >90  --  78  --  78  --   --  61  --  48*   < >  --    < >  --    < > 53*  --  50*   * 109* 129* 150*  --  166*  --  126* 191*   < > 257*   < > 170*   < > 160*  --   --  93  --  108*   < >  --    < >  --    < > 148*   < > 92   A1C  --   --   --   --   --   --   --   --   --   --   --   --   --   --   --   --   --   --   --  6.1*  --   --   --   --    < >  --   --   --    ERIK  --   --  8.0*  --   --   --   --   --  8.7  --  8.5*  --  8.3*  --  8.7  --   --  9.9  --  10.1*   < >  --    < >  --    < > 8.2*  --  7.9*   PHOS  --   --  3.8  --  2.4*  --   --   --  1.7*  --   --   --   --   --   --   --   --   --   --  3.7   < >  --    < >  --    < > 2.8  --   --    MAG  --   --  2.1  --   --   --  2.3  --  1.1*  --   --   --   --   --   --   --   --  1.8  --  1.6*   < >  --    < >  --    < > 2.1  --   --    LACT  --   --   --   --   --   --   --   --   --   --   --   --   --   --   --   --   --   --   --   --   --  1.6  --  1.7   < >  --    < >  --    CKT  --   --   --   --   --   --   --   --   --   --   --   --   --   --   --   --   --   --   --   --   --   --   --   --   --  51  --  55    < > = values in this interval not displayed.     Hepatic Studies    Recent Labs   Lab Test 03/06/23  1843 11/14/22  0726 03/02/22  1631 12/23/21  0714 12/12/21  0519 12/10/21  0647 09/30/21  1059 09/19/21  1629   BILITOTAL 0.2 0.3 0.2 0.2 0.2 0.5   < >  --    ALKPHOS 71 101 69 91 104 105   < >  --    ALBUMIN 3.4* 4.6 3.5 2.8* 2.5* 2.5*   < >  --    AST 32 23 24 22 17 19   < >  --    ALT 23 26 32 31 26 24   < >  --    LDH  --   --   --   --   --   --   --  423*    < > = values in this interval not displayed.     Pancreatitis testing    Recent Labs   Lab Test 11/14/22  0726 10/22/21  0407 10/21/21  0354 10/20/21  0308 10/19/21  0338 09/07/21  1324 09/07/21  1100   AMYLASE  --   --   --   --   --   --  32   TRIG 211* 307* 486* 383* 458* 364*  --      Hematology Studies       Recent Labs   Lab Test 03/10/23  0717 03/09/23  0706 03/08/23  0624 03/07/23  0626 03/06/23  1843 12/20/22  0746 03/15/22  0803 03/04/22  1500 11/02/21  1302 11/02/21  1053 11/02/21  0832 11/02/21  0815 11/02/21  0556   WBC 5.0 5.3 5.2 6.9 5.0 5.9   < > 9.8   < > 57.4*  57.4*   < > 37.5* 32.6*   ANEU  --   --   --  5.7 4.2  --   --  9.0*  --  49.9*  --  28.5* 29.7*   ALYM  --   --   --  0.7* 0.3*  --   --  0.4*  --  4.0  --  4.9 1.3   DONALD  --   --   --  0.5 0.5  --   --  0.4  --  1.7*  --  2.3* 0.7   AEOS  --   --   --  0.0 0.0  --   --  0.0  --  0.0  --  0.0 0.0   HGB 10.5* 10.9* 10.0* 10.7* 10.8* 11.4*   < > 11.5*   < > 8.8*   < > 5.8* 8.2*   HCT 34.4* 35.2* 33.0* 35.4* 34.8* 37.6*   < > 35.9*   < > 26.8*   < > 19.6* 27.3*   * 127* 132* 154 123* 215   < > 221   < > 244   < > 382 355    < > = values in this interval not displayed.     Clotting Studies    Recent Labs   Lab Test 03/10/23  0717 03/09/23  0706 03/08/23  1017 03/06/23  1843 06/29/22  1111 06/21/22  0811 11/03/21  0407 11/02/21  1053 11/02/21  0927   INR 2.23* 1.79* 2.09* 2.57*   < > 1.07   < > 1.55*  --    PTT  --   --  106*  --   --  31  --  34 32    < > = values in this interval not displayed.     Arterial Blood Gas Testing    Recent Labs   Lab Test 12/01/21  0725 11/22/21  0426 11/22/21  0103 11/21/21  1029 11/14/21  0534 11/11/21  1740 11/07/21  0626 11/06/21  0438 11/05/21  0504 11/05/21  0336 11/04/21  1016   PH  --   --   --   --   --  7.48*  --  7.43 7.49* 7.49* 7.46*   PCO2  --   --   --   --   --  37  --  37 31* 32* 35   PO2  --   --   --   --   --  106*  --  109* 126* 331* 95   HCO3  --   --   --   --   --  28  --  25 24 24 25   O2PER 2 40 40 40   < > 40   < > 40 40 40 40    < > = values in this interval not displayed.      Urine Studies     Recent Labs   Lab Test 11/01/21  1336 10/25/21  1507 10/22/21  1641 10/17/21  1642 10/17/21  1041   URINEPH 5.5 5.5 7.5* 5.0 5.0   NITRITE Negative Negative Negative Negative Negative    LEUKEST Negative Negative Negative Negative Trace*   WBCU 0 2 3 25* 44*     Vancomycin Levels     Recent Labs   Lab Test 03/07/23  0935 10/18/21  0403   VANCOMYCIN 21.3 12.6     Tobramycin levels     No lab results found.    Gentamicin levels    No lab results found.    Tacrolimus levels    Invalid input(s): TACROLIMUS, TAC, TACR  Transplant Immunosuppression Labs Latest Ref Rng & Units 3/10/2023 3/9/2023 3/8/2023 3/7/2023 3/6/2023   Creat 0.67 - 1.17 mg/dL 0.95 0.93 0.95 1.10 1.10   UREA NITROGEN 7 - 30 mg/dL - - - - -   UREA NITROGEN (R) 6.0 - 20.0 mg/dL 16.2 16.5 18.5 20.1(H) 18.6   WBC 4.0 - 11.0 10e3/uL 5.0 5.3 5.2 6.9 5.0   Neutrophil % - - - 83 83   ANEU 1.6 - 8.3 10e3/uL - - - 5.7 4.2     Cyclosporine levels    Invalid input(s): CYCLOSPORINE, CYC    Mycophenolate levels    Invalid input(s): MYPA, MYP    Sirolimus levels    Invalid input(s): SIROLIMUS, SIR, RAPA    CSF testing     Recent Labs   Lab Test 10/29/21  1221   CWBC 80*   CNEU 94   CLYM 3   CMONO 4   CRBC 9,000*   CGLU 83*   CTP 97*         Microbiology:  OSH labs:  COVID PCR + CT 21.6  Influenza/ RSV PCR negative   NP RVP negative   Legionella urine ag negative   S.pneumo urine ag negative    3/7/22   CMV PCR blood pending   Sputum culture with filamentous fungi   Fungal sputum cx yeast    Last check of C difficile  C Difficile Toxin B by PCR   Date Value Ref Range Status   02/28/2022 Negative Negative Final     Comment:     A negative result does not exclude actual disease due to C. difficile and may be due to improper collection, handling and storage of the specimen or the number of organisms in the specimen is below the detection limit of the assay.     Virology:  No results found for: H6RES    EBV DNA Copies/mL   Date Value Ref Range Status   03/07/2023 Not Detected Not Detected copies/mL Final   12/20/2022 Not Detected Not Detected copies/mL Final   11/14/2022 Not Detected Not Detected copies/mL Final   09/12/2022 Not Detected Not Detected  copies/mL Final   06/20/2022 Not Detected Not Detected copies/mL Final   04/18/2022 Not Detected Not Detected copies/mL Final   03/15/2022 Not Detected Not Detected copies/mL Final   02/10/2022 Not Detected Not Detected copies/mL Final   02/07/2022 Not Detected Not Detected copies/mL Final   01/17/2022 Not Detected Not Detected copies/mL Final   01/07/2022 Not Detected Not Detected copies/mL Final   12/15/2021 Not Detected Not Detected copies/mL Final   11/16/2021 Not Detected Not Detected copies/mL Final       CMV Antibody IgG   Date Value Ref Range Status   03/07/2023 No detectable antibody. No detectable antibody.  Final   10/15/2021 No detectable antibody. No detectable antibody.  Final   09/07/2021 No detectable antibody. No detectable antibody.  Final       Toxoplasma Antibody IgG   Date Value Ref Range Status   09/07/2021 <3.0 0.0 - 7.1 IU/mL Final     Comment:     Negative- Absence of detectable Toxoplasma gondii IgG antibodies. A negative result does not rule out acute infection.       Imaging:  3/5/23 CTA CHEST OSH   IMPRESSION:   1.  2 small filling defects are seen in the right apical pulmonary artery, which are suspicious for subtle acute pulmonary emboli.   2.  Bilateral interstitial lung disease in the setting of rheumatoid arthritis.  Overall, findings appear less apparent than on 8/30/2021.  However, given the somewhat peripheral nature of this, correlation is recommended for any signs or symptoms of superimposed viral pneumonia such as COVID-19 pneumonia based on CT findings.   3.  No aortic dissection.   4.  Additional chronic findings as described.     ECHO  9/5/21 TTE   Interpretation Summary  Small biventricular size with hyperdynamic biventricular function.  The inferior vena cava was normal in size with preserved respiratory  variability.  No pericardial effusion is present.      Brenda Grijalva MD  [facility]  Contact information  available via [facility] Paging/Directory  03/10/2023

## 2023-03-10 NOTE — DISCHARGE SUMMARY
[facility]  Discharge Summary - Medicine & Pediatrics       Date of Admission:  3/6/2023  Date of Discharge:  3/14/2023  1:24 PM  Discharging Provider: Nadja Hull  Discharge Service: Medicine Service, LEIGH TEAM 1    Discharge Diagnoses   COVID Pneumonia  Acute Hypoxic Respiratory Failure  Aspergillus pneumonia  BSLT for NSIP/ILD  Pulmonary Embolism  Chest pressure  HTN   Hypothyroidusm  GERD   Insomnia   Steroid induced hyperglycemia  BPH  Mood Disorder    Follow-ups Needed After Discharge   Follow up with transplant pulmonology as scheduled outpatient.     Unresulted Labs Ordered in the Past 30 Days of this Admission     Date and Time Order Name Status Description    3/12/2023  9:55 AM Respiratory Aerobic Bacterial Culture with Gram Stain Preliminary       These results will be followed up by Transplant Pulm and primary care physician.     Discharge Disposition   Discharged to home  Condition at discharge: Stable    Hospital Course   Edson Thornton Jr. was admitted on 3/6/2023. He has a history of bilateral lung transplant 2/2 NSIP, afib (11/2021),  Who was transferred from [place] after hypoxic respiratory failure in the setting of persistent COVID positive testing and imaging initially concerning for  PE while taking therapeutic coumadin (though on further evaluation, seems less likely as treatment failure - details below).     The following problems were addressed during his hospitalization:    #COVID Pneumonia  #Acute Hypoxic Respiratory Failure  #Possible Aspergillus Infection  #BSLT for NSIP/ILD  Patient presented with shortness of breathing and fevers and found to be COVID positive on 3/3. He required O2 support but weaned back to RA on day of discharge, and needs no oxygen at baseline. He was recently treated for COVID in 2/2023 however continued to have ongoing symptoms that required retesting for COVID. Immunosuppression may be contributing to ongoing  COVID infection. Hypoxia likely due to COVID and PE as mentioned below. Transplant ID and transplant pulmonary consulted, was started on antibiotics, prednisone which were discontinued given mild symptoms and low suspicion for bacterial infection. Completed 5 day course of remdesivir ( 3/5-3/9). Sputum culture did grow aspergillus and was started on voriconazole.   - Xopenex TID inhaler (changed from neb)   - Hold myfortic (held since 3/5), continue to hold on discharge  - continue 0.5 mg BID of tacrolimus, f/u txp pulm for further instructions on next lab check    - Will need CT chest in 2 weeks and transplant pulm follow up  - Sputum culture positive for septate hyphae              - Start voriconazole 400mg x2 doses then continue 250mg BID on discharge              - Will need weekly LFTs              - Voriconazole trough level after 3/16/23   - Weekly LFTs until follow up with transplant ID in 1 month   -Augmentin 875 mg for 10 days for rothia and gram positive cocci on sputum culture, end date ~3/20/23)  -continue PTA prednisone  -PTA Bactrim for PJP ppx         #Apical Pulmonary Embolism  CTA obtained at outside hospital (3/2023) showed 2 small filling defects in the right apical pulmonary artery,suspicious for subtle acute pulmonary emboli. Patient was taking coumadin with INR in therapeutic window. Heme was consulted and is not convinced that this is an acute pulmonary embolism and recommended resuming pTA warfarin. Discussed with radiology, unable to determine definitely if artifact or PE given different CT machine. Will continue treating as true PE. High intensity heparin discontinued once therapeutic on warfarin x2.   - continue PTA warfarin  - INR checks as scheduled outpatient      #Chest Pressure   Patient endorsed new onset of chest pain that started on AM of  (3/9), was not described as painful but noticeable pressure that is reporducible on palpation. Likely MSK, symptoms not concerning for ACS.  Improved by day of discharge     #HTN: Resume PTA Norvasc 10mg   #Hypothyroidusm: PTA levo  #GERD: PTA Pantoprazole   #Insomnia: PTA Melatonin   #Steroid induced hyperglycemia: sliding scale insulin while inpatient  #BPH: PTA Tamsulosin    #Mood Disorder: PTA Fluoxetine, PTA Mirtazapine, PTA Lamotrigine    Consultations This Hospital Stay   PULMONARY CF/TRANSPLANT ADULT IP CONSULT  PHARMACY IP CONSULT  PHARMACY IP CONSULT  INFECTIOUS DISEASE TRANSPLANT SOT ADULT IP CONSULT  PHARMACY TO DOSE VANCO  PHARMACY TO DOSE VANCO  NURSING TO CONSULT FOR VASCULAR ACCESS CARE IP CONSULT  NURSING TO CONSULT FOR VASCULAR ACCESS CARE IP CONSULT  HEMATOLOGY ADULT IP CONSULT  PHARMACY TO DOSE WARFARIN  PHARMACY IP CONSULT  PHARMACY IP CONSULT  NURSING TO CONSULT FOR VASCULAR ACCESS CARE IP CONSULT  PHYSICAL THERAPY ADULT IP CONSULT  NURSING TO CONSULT FOR VASCULAR ACCESS CARE IP CONSULT    Code Status   Full Code       Nadja Hull MD  [address] [address]  Phone: [phone]  ______________________________________________________________________    Physical Exam   Vital Signs: Temp: 98.4  F (36.9  C) Temp src: Oral BP: (!) 144/90 Pulse: 110   Resp: 18 SpO2: 99 % O2 Device: None (Room air)    Weight: 153 lbs 6.4 oz    Gen: no acute distress, alert, interactive  HEENT: normal conjunctivae, EOMI  Nares: No discharge noted from nares bilaterally   CV: RRR, no murmurs  Pulm: CTBA, no crackles or wheezing  Abd: soft, nl BS, NT, no HSM  Msk: no deformities  Neuro: moving all extremities spontaneously       Primary Care Physician   Provider Not In System    Discharge Orders      INR     Physical Therapy Referral      Activity    Your activity upon discharge: activity as tolerated     [facility] Follow-up and recommended labs and tests    Follow up with primary care provider, Provider Not In System, within 7 days for hospital follow- up.      Appointments on  [place] and/or [facility] (with [facility] or [facility] provider or service). Call [phone] if you haven't heard regarding these appointments within 7 days of discharge.     Reason for your hospital stay    You were admitted due to COVID pneumonia. You were also treated for a fungal and bacterial lung infections.   - Follow up with Transplant Pulmonology as scheduled  - Follow up with your primary care doctor in the next week for hospital follow up   - Continue taking Augmentin twice a day   - Continue taking voriconazole twice a day, you will take this for 12 weeks  - While you are taking voriconazole you will need to get a weekly lab test to monitor your liver  - Take 0.5mg warfarin and get an INR check in 2 days   - Take tacrolimus 0.5mg twice a day until following up with transplant pulmonology     Nebulizer and Supplies Order    Nebulizer Documentation  I attest that I have seen and evaluated Edson Thornton Jr.. He has a diagnosis of  COVID and COPD and a nebulizer machine is needed to administer medication to improve breathing passages.     I, the undersigned, certify that the above prescribed supplies are medically necessary for this patient and is both reasonable and necessary in reference to accepted standards of medical and necessary in reference to accepted standards of medical practice in the treatment of this patient's condition and is not prescribed as a convenience.     Diet    Follow this diet upon discharge: regular       Significant Results and Procedures   Most Recent 3 CBC's:  Recent Labs   Lab Test 03/12/23  1320 03/12/23  0544 03/11/23  2327   WBC 6.5 6.6 8.2   HGB 12.5* 9.8* 11.1*   MCV 87 81 80   * 124* 140*     Most Recent 3 BMP's:  Recent Labs   Lab Test 03/14/23  1102 03/14/23  0717 03/14/23  0626 03/13/23  0809 03/13/23  0713 03/12/23  0808 03/12/23  0544   NA  --   --  140  --  142  --  139   POTASSIUM  --   --  4.7  --  4.9  --  4.6   CHLORIDE  --   --  106  --  109*  --  106    CO2  --   --  21*  --  21*  --  22   BUN  --   --  19.2  --  19.3  --  20.8*   CR  --   --  1.31*  --  1.27*  --  1.31*   ANIONGAP  --   --  13  --  12  --  11   ERIK  --   --  9.3  --  8.8  --  8.8   * 107* 94   < > 159*   < > 159*    < > = values in this interval not displayed.   ,   Results for orders placed or performed during the hospital encounter of 03/06/23   US Lower Extremity Venous Duplex Bilateral    Narrative    EXAMINATION: US LOWER EXTREMITY VENOUS DUPLEX BILATERAL  3/8/2023 5:26  PM      CLINICAL HISTORY: PE    COMPARISON: None        PROCEDURE COMMENTS: Ultrasound was performed of the deep venous system  of the right and left lower extremity using grayscale, color, and  spectral Doppler.    FINDINGS:  The common femoral, greater saphenous origin, femoral, popliteal, and  deep calf veins are visualized and are patent. Venous waveforms are  normal. There is normal response to compression.      Impression    IMPRESSION:.  No deep vein thrombosis in the right or left lower extremity.    I have personally reviewed the examination and initial interpretation  and I agree with the findings.    MAURICIO NAVAS MD         SYSTEM ID:  [id number]   XR Chest Port 1 View    Narrative    Exam: XR CHEST PORT 1 VIEW, 3/9/2023 1:52 PM    Comparison: Chest x-ray 12/20/2022    History: dyspnea, productive cough, lung transplant/COVID history    Findings:  Single portable AP view of the chest. Stable postsurgical changes of  the chest with intact clamshell sternotomy wires. Surgical clips  project over the chest. Trachea is midline. Cardiac silhouette is  stably enlarged. Increased diffuse granular opacities. Stable  bibasilar streaky opacities. No appreciable pneumothorax or pleural  effusion. The visualized upper abdomen is unremarkable. No acute  osseous abnormality.      Impression    Impression:   1. Stable post surgical changes of the chest.  2. Stable streaky bibasilar opacities and increased diffuse  granular  opacities, may represent atelectasis/edema.    I have personally reviewed the examination and initial interpretation  and I agree with the findings.    JODI MENDEZ MD         SYSTEM ID:  [id number]   XR Chest Port 1 View    Narrative    EXAM: Chest radiograph 3/11/2023 10:50 PM    HISTORY: Shortness of breath.     COMPARISON: 3/9/2023.     TECHNIQUE: Portable AP view of the chest.    FINDINGS: Rotated x-ray.    Postsurgical changes of bilateral lung transplant with intact clam  shell sternotomy wires. Midline trachea. Stable cardiac silhouette.  Trace bilateral presumed pleural thickening. No pneumothorax.  Unchanged perihilar and basilar patchy mixed airspace and interstitial  opacities, with increased opacities in the left upper lung zone. No  acute osseous abnormality. Chronic appearing distal right clavicle  deformity with widening of the acromioclavicular joint. Upper abdomen  and soft tissues unremarkable.      Impression    IMPRESSION: Increased patchy opacities in the left upper/mid zone  suggesting atelectasis and/or pulmonary edema versus infection.  Persistent bibasilar opacities.    I have personally reviewed the examination and initial interpretation  and I agree with the findings.    SHANDRA CEVALLOS MD         SYSTEM ID:  [id number]   XR Chest 2 Views    Narrative    Exam: XR CHEST 2 VIEWS, 3/13/2023 10:51 AM    Comparison: 3/11/2023    History: Added with covid, h/o left upper opacity. History of  bilateral lung transplant.    Findings:  Stable postoperative changes of the chest and upper abdomen. Clamshell  sternotomy wires intact.    Cardiac silhouette is partially obscured. No pneumothorax. Bilateral  blunting of the costophrenic angles. Increased perihilar and bibasilar  patchy opacities. Chronic right distal clavicle deformity. No  acute-appearing osseous lesions.      Impression    Impression:   1. Increased perihilar and bibasilar patchy opacities, representing  infection versus  atelectasis/edema.  2. Blunting of the costophrenic angles bilaterally, representing  effusion versus scarring.    I have personally reviewed the examination and initial interpretation  and I agree with the findings.    JODI MENDEZ MD         SYSTEM ID:  [id number]     *Note: Due to a large number of results and/or encounters for the requested time period, some results have not been displayed. A complete set of results can be found in Results Review.       Discharge Medications   Discharge Medication List as of 3/14/2023 12:19 PM      START taking these medications    Details   amoxicillin-clavulanate (AUGMENTIN) 875-125 MG tablet Take 1 tablet by mouth every 12 hours for 13 doses, Disp-13 tablet, R-0, E-Prescribe      levalbuterol (XOPENEX HFA) 45 MCG/ACT inhaler Inhale 2 puffs into the lungs 3 times daily, Disp-3 g, R-3, E-Prescribe      voriconazole (VFEND) 50 MG tablet Take 5 tablets (250 mg) by mouth every 12 hours for 79 days, Disp-790 tablet, R-0, E-Prescribe         CONTINUE these medications which have CHANGED    Details   tacrolimus (GENERIC EQUIVALENT) 0.5 MG capsule Take 1 capsule (0.5 mg) by mouth 2 times daily, Disp-60 capsule, R-0, E-Prescribe      warfarin ANTICOAGULANT (COUMADIN) 1 MG tablet Take 0.5 tablets (0.5 mg) by mouth daily Take 1/2 tablet daily as directed by Anticoagulation Clinic., Disp-90 tablet, R-1, E-Prescribe         CONTINUE these medications which have NOT CHANGED    Details   acetaminophen (TYLENOL) 325 MG tablet 3 tablets (975 mg) by Oral or Feeding Tube route every 8 hours as needed for mild pain, Disp-100 tablet, R-11, E-Prescribe      amLODIPine (NORVASC) 10 MG tablet Take 1 tablet (10 mg) by mouth At Bedtime, Disp-30 tablet, R-11, E-Prescribe      calcium carbonate 600 mg-vitamin D 400 units 600-400 MG-UNIT per tablet 1 tablet by Oral or Feeding Tube route daily, Disp-30 tablet, R-11, E-Prescribe      diclofenac (VOLTAREN) 1 % topical gel Apply 4 g topically 4 times  daily Both hands, Disp-150 g, R-11, E-Prescribe      FLUoxetine (PROZAC) 20 MG capsule Take 20 mg by mouth daily, Historical      fluticasone (FLONASE) 50 MCG/ACT nasal spray Spray 1 spray into both nostrils daily, Disp-16 g, R-11, E-Prescribe      fluticasone-salmeterol (ADVAIR) 500-50 MCG/ACT inhaler Inhale 1 puff into the lungs every 12 hours, Disp-60 each, R-11, E-Prescribe      hydroxychloroquine (PLAQUENIL) 200 MG tablet Take 200 mg by mouth 2 times daily, Historical      insulin aspart (NOVOLOG PEN) 100 UNIT/ML pen Take 1-3 units TID if BS over 200. Max daily dose is 12, Disp-15 mL, R-11, E-Prescribe      lamoTRIgine (LAMICTAL) 25 MG tablet Take 50 mg by mouth daily, Historical      levothyroxine (SYNTHROID/LEVOTHROID) 25 MCG tablet Take 1 tablet (25 mcg) by mouth daily, Disp-30 tablet, R-11, E-Prescribe      Magnesium Glycinate POWD 300 mg 3 times daily Take 3 tablets (100mg each) three times daily, Disp-18 g, R-11, Historical      Melatonin 10 MG TABS tablet Take 1 tablet (10 mg) by mouth nightly as needed for sleep, Disp-30 tablet, R-3, E-Prescribe      mirtazapine (REMERON) 7.5 MG tablet Take 7.5 mg by mouth At Bedtime, Historical      multivitamin w/minerals (THERA-VIT-M) tablet Take 1 tablet by mouth daily, Disp-30 tablet, R-11, E-Prescribe      ondansetron (ZOFRAN-ODT) 4 MG ODT tab Take 1 tablet (4 mg) by mouth every 6 hours as needed for nausea, Disp-30 tablet, R-3, E-Prescribe      pantoprazole (PROTONIX) 40 MG EC tablet Take 1 tablet (40 mg) by mouth 2 times daily (before meals), Disp-60 tablet, R-11, E-Prescribe      !! predniSONE (DELTASONE) 2.5 MG tablet Take 1 tablet (2.5 mg) by mouth every evening Total dose: 5mg in AM and 2.5 mg in PM, Disp-30 tablet, R-11, E-PrescribeTXP DT 10/16/2021 (Lung) TXP Dischg DT 12/13/2021 DX Lung replaced by transplant Z94.2 TX Center [facility] ([place], [place])      !! predniSONE (DELTASONE) 5 MG tablet Take 1 tablet (5 mg)  by mouth every morning AND 0.5 tablets (2.5 mg) every evening., Disp-135 tablet, R-3, E-Prescribe      propranolol (INDERAL) 20 MG tablet Take 2 tablets (40 mg) by mouth 2 times daily, Disp-120 tablet, R-11, E-Prescribe      rOPINIRole (REQUIP) 0.25 MG tablet Take 0.25 mg by mouth At Bedtime, Historical      rosuvastatin (CRESTOR) 10 MG tablet Take 1 tablet (10 mg) by mouth daily, Disp-30 tablet, R-11, E-Prescribe      senna-docusate (SENOKOT-S/PERICOLACE) 8.6-50 MG tablet Take 2 tablets by mouth 2 times daily as needed for constipation, Disp-120 tablet, R-11, Historical      sulfamethoxazole-trimethoprim (BACTRIM) 400-80 MG tablet Take 1 tablet by mouth daily, Disp-30 tablet, R-11, E-Prescribe      tamsulosin (FLOMAX) 0.4 MG capsule Take 1 capsule (0.4 mg) by mouth daily, Historical      insulin pen needle (32G X 4 MM) 32G X 4 MM miscellaneous Use 4 pen needles daily or as directed.Disp-120 each, [id number]       !! - Potential duplicate medications found. Please discuss with provider.      STOP taking these medications       insulin glargine (LANTUS PEN) 100 UNIT/ML pen Comments:   Reason for Stopping:         levofloxacin (LEVAQUIN) 750 MG tablet Comments:   Reason for Stopping:         MYFORTIC (BRAND) 360 MG EC tablet Comments:   Reason for Stopping:         tacrolimus (GENERIC EQUIVALENT) 1 MG capsule Comments:   Reason for Stopping:             Allergies   No Known Allergies

## 2023-03-10 NOTE — PROGRESS NOTES
Patient has been assessed for Home Oxygen needs. Oxygen readings:    *Pulse oximetry (SpO2) = 95% on room air at rest while awake.    *SpO2 improved to 95% on 0liters/minute at rest.    *SpO2 = 88% on room air during activity/with exercise.    *SpO2 improved to 94% on 2liters/minute during activity/with exercise.      Patient walked in room for 5mins demonstrating increased respiration with dyspnea. Patient would benefit from supplemental oxygen at home until his condition improves.

## 2023-03-10 NOTE — PROGRESS NOTES
"   03/10/23 0906   Appointment Info   Signing Clinician's Name / Credentials (PT) Krystle Almanza, PT, DPT   Living Environment   People in Home significant other;grandchild(paula)   Current Living Arrangements house   Home Accessibility stairs to enter home;stairs within home   Number of Stairs, Main Entrance 2   Stair Railings, Main Entrance none   Number of Stairs, Within Home, Primary greater than 10 stairs  (12 down to lower level bedroom)   Living Environment Comments Pt's bedroom is on lower level of home 12 steps down; he reports he could sleep on the main level if needed. Has bathrooms on both levels- tub shower on main level and walk in shower on lower level (has shower chair available).   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   General Information   Onset of Illness/Injury or Date of Surgery 03/06/23   Referring Physician Keyla Rice, PACharyC   Patient/Family Therapy Goals Statement (PT) not stated   Pertinent History of Current Problem (include personal factors and/or comorbidities that impact the POC) Per chart, \"Edson Thornton Jr. is a 57 year old male admitted on 3/6/2023. He has a history of bilateral lung transplant 2/2 NSIP, afib (11/2021),  Who was transferred from [place] after hypoxic respiratory failure in the setting of persistent COVID positive testing and imaging concerning for PE while taking therapeutic coumadin. Patient has been improving on Remdesivr, has been breathing comfortably on room air with saturation >90%, regarding PE results are indeterminate, but not worried acutely about immediate recurrence or continued suspicion for current presentation being representative. \" "   Cognition   Orientation Status (Cognition) oriented x 4   Pain Assessment   Patient Currently in Pain   (did not state)   Integumentary/Edema   Integumentary/Edema no deficits were identifed   Posture    Posture Forward head position;Protracted shoulders   Range of Motion (ROM)   ROM Comment ROM appears grossly WFL   Strength (Manual Muscle Testing)   Strength (Manual Muscle Testing) strength is WFL   Bed Mobility   Comment, (Bed Mobility) anticipate SBA or IND per clinical judgement, not observed this session   Transfers   Comment, (Transfers) IND   Gait/Stairs (Locomotion)   Comment, (Gait/Stairs) Patient ambulated in room with 1 hand on IV pole and without UE support; steady with IV pole support; without IV pole with some unsteadiness, 1 misstep, recovered with SBA   Balance   Balance Comments Slightly unsteady without UE support, particularly when fatigued. Steady with 1 hand on IV pole   Clinical Impression   Criteria for Skilled Therapeutic Intervention Yes, treatment indicated   PT Diagnosis (PT) decreased functional endurance & decreased functional independence   Influenced by the following impairments respiratory status, decreased endurance, decreased balance when fatigued   Functional limitations due to impairments decreased independence with ambulation, decreased functional endurance   Clinical Presentation (PT Evaluation Complexity) Stable/Uncomplicated   Clinical Presentation Rationale clinical judgement   Clinical Decision Making (Complexity) low complexity   Planned Therapy Interventions (PT) balance training;gait training;home exercise program;neuromuscular re-education;patient/family education;progressive activity/exercise;home program guidelines;stair training   Anticipated Equipment Needs at Discharge (PT)   (pt has walker, cane & shower chair available at home if needed)   Risk & Benefits of therapy have been explained evaluation/treatment results reviewed;care plan/treatment goals  "reviewed;risks/benefits reviewed;current/potential barriers reviewed;participants included;patient;participants voiced agreement with care plan   Clinical Impression Comments Patient presents with dyspnea on exertion and limited functional endurance. Also with some unsteadiness when ambulating without UE support when fatigued. He will benefit from continued PT while hospitalized to progress functional endurance & establish HEP.   PT Total Evaluation Time   PT Eval, Low Complexity Minutes (83174) 5   Plan of Care Review   Plan of Care Reviewed With patient   Physical Therapy Goals   PT Frequency 3x/week   PT Predicted Duration/Target Date for Goal Attainment 03/17/23   PT Goals Bed Mobility;Transfers;Stairs;Gait;PT Goal 1   PT: Gait Independent;100 feet   PT: Stairs Supervision/stand-by assist;2 stairs   PT: Goal 1 Patient will verbalize understanding of & demonstrate independence with ambulation program/HEP to progress functional endurance   Interventions   Interventions Quick Adds Therapeutic Activity;Therapeutic Procedure   PT Discharge Planning   PT Discharge Recommendation (DC Rec) home with assist   PT Rationale for DC Rec Pt mobilizing fairly well but with dyspnea on exertion limiting activity tolerance. Recommend assist of S.O. or grandson for stairs to enter home and errands outside the home and more taxing houshold tasks. Pt has walker and cane at home available- will likely benefit from using walker for energy conservation  & added support as pt mildly unsteady when fatigued. Discussed possibility of home PT or OP pulmonary rehab follow up or HEP ambulation program for home, pt prefers home ambulation/exercise program.   PT Brief overview of current status OK to be up IND with IV pole during day, rec SBA at night; pt encouraged to walk \"laps\" in room multiple times a day   Total Session Time   Total Session Time (sum of timed and untimed services) 5       "

## 2023-03-10 NOTE — PROGRESS NOTES
[facility]    Progress Note - Medicine Service, [facility] TEAM 1       Date of Admission:  3/6/2023    Assessment & Plan   Edson Thornton Jr. is a 57 year old male admitted on 3/6/2023. 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 is a 57 year old male admitted on 3/6/2023. 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 Patient has been improving on Remdesivr, has been breathing comfortably on room air with saturation >90%, regarding PE results are indeterminate, but not worried acutely about immediate recurrence or continued suspicion for current presentation being representative.    Today:  - Exercise oximetry   - INR therapeutic, repeat tomorrow INR  - Continue Voriconazole       #COVID Pneumonia  #Acute Hypoxic Respiratory Failure  #Possible Aspergillus Infection  Patient has been breathing comfortably on room air with O2 saturation >92%. Endorses shortness of breath on exertion with light crackles and wheezing present bilaterally. Patient denies any shortness of breath on exertion, has been afebrile. Per ID recommendation will continue remdesivir 100mg for 5 days as patient showed improvement initially, discontinue dexamethasone and stop antibiotics as this is most likely viral with a low likely yang of bacterial or fungal infection. Per Transplant Pulmonary recommendations they don't believe this is a superimpose bacterial infection given patient has unremarkable procalcitonin and no evidence of other physical exam findings,  recommended starting xopenex for wheezing. Patients sputum cultures came back positive for septate hyphae and growing yeast concerning for aspergillus infection.  - Transplant ID consulted, appreciate recs   - Transplant pulm following, appreciate recs   - Xopenex neb TID  - Hold myfortic (held since 3/5), continue to hold on discharge   - S/p IVIG 3/8  - Completed Remdesivir 100mg IV for 5 days (3/5-3/9)  - Sputum culture positive for septate hyphae   - Start voriconazole 400mg x2 doses then 250mg BID    - Will need weekly LFTs   - Voriconazole trough level on 3/14/23  - On discharge continue xopenex followed by hypertonic saline nebulizer BID for 2 weeks   - Will need CT chest in 2 weeks and transplant pulm follow up        #Apical Pulmonary Embolism  CTA obtained at outside hospital (3/2023) showed 2 small filling defects in the right apical pulmonary artery,suspicious for subtle acute pulmonary emboli. Patient was taking coumadin with INR in therapeutic window. Heme was consulted and is not convinced that this is an acute pulmonary embolism and recommended restarying PTA warfarin. Discussed with radiology, unable to determine definitely if artifact or PE given different CT machine. Will continue treating as true PE.   - Therapeutic on warfarin, repeat tomorrow   - Continue high dose heparin, discontinue tomorrow if INR therapeutic      #Lung Transplant (10/2021) 2/2 NISP   -pulmonary transplant consulted   -will continue PTA tacrolimus and prednisone and hold PTA MMF  -PTA Prednisone 5 in AM and 2.5 in PM  -PTA Tacrolimus 2 mg in AM and 5 in PM  -PTA Bactrim for PJP ppx      #Chest Pressure   Patient endorsed new onset of chest pain that started this AM (3/9), was not described as painful but noticeable pressure that is reporducible on palpation.Given patients recent concern for PE, but no other red flag signs we will monitor this for angina, effusion, with most likely cause being musculoskeletal related from  "deconditioning.  - Monitor with no immediate intervention indicated at this time     chronic conditions~      #HTN: Restarted PTA Norvasc 10mg   #Hypothyroidusm: PTA levo  #GERD: PTA Pantoprazole   #Insomnia: PTA Melatonin   #Steroid induced hyperglycemia: sliding scale insulin  #BPH: PTA Tamsulosin    #Mood Disorder: PTA Fluoxetine, PTA Mirtazapine, PTA Lamotrigine       Diet: Combination Diet Regular Diet Adult  DVT Prophylaxis: heparin gtt  Watson Catheter: Not present  Fluids: none  Cardiac Monitoring: None  Code Status: Full Code      Clinically Significant Risk Factors            # Hypomagnesemia: Lowest Mg = 1.1 mg/dL in last 2 days, will replace as needed   # Hypoalbuminemia: Lowest albumin = 3.4 g/dL at 3/6/2023  6:43 PM, will monitor as appropriate   # Thrombocytopenia: Lowest platelets = 119 in last 2 days, will monitor for bleeding         # Overweight: Estimated body mass index is 26.5 kg/m  as calculated from the following:    Height as of this encounter: 1.626 m (5' 4\").    Weight as of this encounter: 70 kg (154 lb 6.4 oz)., PRESENT ON ADMISSION         Disposition Plan      Expected Discharge Date: 03/12/2023        Discharge Comments: No transportation 3/11        The patient's care was discussed with the Attending Physician, Dr. Lobito Cheatham, Chief Resident/Fellow and Patient.    Allen Pisano  Medical Student  Medicine Service, [facility] TEAM [address] [place] [address]  Securely message with [employer] (more info)  Text page via [facility] Paging/Directory   See signed in provider for up to date coverage information     Resident/Fellow Attestation   I, Lanie Abdalla MD, was present with the medical/PRINCESS student who participated in the service and in the documentation of the note.  I have verified the history and personally performed the physical exam and medical decision making.  I agree with the assessment and plan of care as documented in the note.      Lanie Abdalla, " "MD  PGY1  Date of Service (when I saw the patient): 03/10/23    ______________________________________________________________________    Interval History   Patient on interview was sitting up in bed talking comfortably. He stated that he felt \"better than yesterday\".  He still endorsed shortness of breath and a feeling of chest pressure that is painful on deep inspiration. The shortness of breath is still present on exertion but at rest not requiring any oxygen. The chest pressure has been consistent with no changes since admission, and has no radiation, and is only brought on by deep breathing. He denies any subjective fever, nausea, vomiting, numbness, tingling, blurry vision.    Physical Exam   Vital Signs: Temp: 98.6  F (37  C) Temp src: Oral BP: (!) 146/81 Pulse: 112   Resp: 20 SpO2: 92 % O2 Device: Nasal cannula Oxygen Delivery: 2 LPM  Weight: 154 lbs 6.4 oz    Constitutional: awake, alert, cooperative, no apparent distress, and appears stated age  Eyes: Lids and lashes normal, pupils equal, round and reactive to light, extra ocular muscles intact, sclera clear, conjunctiva normal  Respiratory: no increased work of breathing, air exchange is improved with equal breath sounds bilaterally and occasional wet cough, no retractions and faint crackles right base and left base  Cardiovascular: Normal apical impulse, regular rate and rhythm, normal S1 and S2, no S3 or S4, and no murmur noted  Skin: no bruising or bleeding  Musculoskeletal: no lower extremity pitting edema present  there is no redness, warmth, or swelling of the joints   strength is normal bilaterally  Neuropsychiatric: General: normal, calm and normal eye contact    Medical Decision Making       Data     I have personally reviewed the following data over the past 24 hrs:    5.0  \   10.5 (L)   / 119 (L)     140 106 16.2 /  109 (H)   4.2 23 0.95 \       Procal: N/A CRP: 53.80 (H) Lactic Acid: N/A       INR:  2.23 (H) PTT:  N/A   D-dimer:  <0.27 " Fibrinogen:  N/A       Imaging results reviewed over the past 24 hrs:   Recent Results (from the past 24 hour(s))   XR Chest Port 1 View    Narrative    Exam: XR CHEST PORT 1 VIEW, 3/9/2023 1:52 PM    Comparison: Chest x-ray 12/20/2022    History: dyspnea, productive cough, lung transplant/COVID history    Findings:  Single portable AP view of the chest. Stable postsurgical changes of  the chest with intact clamshell sternotomy wires. Surgical clips  project over the chest. Trachea is midline. Cardiac silhouette is  stably enlarged. Increased diffuse granular opacities. Stable  bibasilar streaky opacities. No appreciable pneumothorax or pleural  effusion. The visualized upper abdomen is unremarkable. No acute  osseous abnormality.      Impression    Impression:   1. Stable post surgical changes of the chest.  2. Stable streaky bibasilar opacities and increased diffuse granular  opacities, may represent atelectasis/edema.    I have personally reviewed the examination and initial interpretation  and I agree with the findings.    JODI MENDEZ MD         SYSTEM ID:  [id number]

## 2023-03-10 NOTE — PROGRESS NOTES
[id number]  Neuro: A&Ox4  Cardiac: Afebrile, VSS. No tele orders, HR 60s-70s overnight.              Respiratory: RA/2L NC during day. Hospital CPAP at night.  GI/: Voiding spontaneously. LBM 3/9.  Diet/appetite: Fair appetite, regular diet  Activity: SBA  Pain: Denies pain  Skin: scattered bruising. R forearm PIV infiltrated upon assessment at beginning of shift. IV removed, no pain to site per patient. Firmness around old IV site marked and has not gone outside border. IV was infusing NaCl upon assessment. Had previously infused redemsivir that had completed 4 hours prior to this assessment.  Lines: PIV x 2 in L arm. Heparin infusing at 850 units/hr. Next antiXa 0817.   Labs: phosphorus replaced IV overnight     Plan: cont POC

## 2023-03-11 ENCOUNTER — APPOINTMENT (OUTPATIENT)
Dept: GENERAL RADIOLOGY | Facility: CLINIC | Age: 58
DRG: 177 | End: 2023-03-11
Attending: STUDENT IN AN ORGANIZED HEALTH CARE EDUCATION/TRAINING PROGRAM
Payer: COMMERCIAL

## 2023-03-11 LAB
ANION GAP SERPL CALCULATED.3IONS-SCNC: 11 MMOL/L (ref 7–15)
ATRIAL RATE - MUSE: 78 BPM
BASE EXCESS BLDV CALC-SCNC: -0.3 MMOL/L (ref -7.7–1.9)
BUN SERPL-MCNC: 19.8 MG/DL (ref 6–20)
CALCIUM SERPL-MCNC: 8.9 MG/DL (ref 8.6–10)
CHLORIDE SERPL-SCNC: 106 MMOL/L (ref 98–107)
CREAT SERPL-MCNC: 1.14 MG/DL (ref 0.67–1.17)
DEPRECATED HCO3 PLAS-SCNC: 24 MMOL/L (ref 22–29)
DIASTOLIC BLOOD PRESSURE - MUSE: NORMAL MMHG
ERYTHROCYTE [DISTWIDTH] IN BLOOD BY AUTOMATED COUNT: 16.3 % (ref 10–15)
ERYTHROCYTE [DISTWIDTH] IN BLOOD BY AUTOMATED COUNT: 16.5 % (ref 10–15)
GFR SERPL CREATININE-BSD FRML MDRD: 75 ML/MIN/1.73M2
GLUCOSE BLDC GLUCOMTR-MCNC: 140 MG/DL (ref 70–99)
GLUCOSE BLDC GLUCOMTR-MCNC: 190 MG/DL (ref 70–99)
GLUCOSE BLDC GLUCOMTR-MCNC: 225 MG/DL (ref 70–99)
GLUCOSE BLDC GLUCOMTR-MCNC: 226 MG/DL (ref 70–99)
GLUCOSE BLDC GLUCOMTR-MCNC: 362 MG/DL (ref 70–99)
GLUCOSE SERPL-MCNC: 185 MG/DL (ref 70–99)
HCO3 BLDV-SCNC: 23 MMOL/L (ref 21–28)
HCT VFR BLD AUTO: 35.8 % (ref 40–53)
HCT VFR BLD AUTO: 36.6 % (ref 40–53)
HGB BLD-MCNC: 11 G/DL (ref 13.3–17.7)
HGB BLD-MCNC: 11.1 G/DL (ref 13.3–17.7)
INR PPP: 2.62 (ref 0.85–1.15)
INTERPRETATION ECG - MUSE: NORMAL
LACTATE SERPL-SCNC: 1.6 MMOL/L (ref 0.7–2)
MAGNESIUM SERPL-MCNC: 1.4 MG/DL (ref 1.7–2.3)
MAGNESIUM SERPL-MCNC: 2.7 MG/DL (ref 1.7–2.3)
MCH RBC QN AUTO: 24.4 PG (ref 26.5–33)
MCH RBC QN AUTO: 24.9 PG (ref 26.5–33)
MCHC RBC AUTO-ENTMCNC: 30.1 G/DL (ref 31.5–36.5)
MCHC RBC AUTO-ENTMCNC: 31 G/DL (ref 31.5–36.5)
MCV RBC AUTO: 80 FL (ref 78–100)
MCV RBC AUTO: 81 FL (ref 78–100)
O2/TOTAL GAS SETTING VFR VENT: 21 %
P AXIS - MUSE: 39 DEGREES
PCO2 BLDV: 34 MM HG (ref 40–50)
PH BLDV: 7.44 [PH] (ref 7.32–7.43)
PHOSPHATE SERPL-MCNC: 4.3 MG/DL (ref 2.5–4.5)
PLATELET # BLD AUTO: 108 10E3/UL (ref 150–450)
PLATELET # BLD AUTO: 140 10E3/UL (ref 150–450)
PO2 BLDV: 39 MM HG (ref 25–47)
POTASSIUM SERPL-SCNC: 4.5 MMOL/L (ref 3.4–5.3)
PR INTERVAL - MUSE: 136 MS
QRS DURATION - MUSE: 78 MS
QT - MUSE: 372 MS
QTC - MUSE: 424 MS
R AXIS - MUSE: 30 DEGREES
RBC # BLD AUTO: 4.45 10E6/UL (ref 4.4–5.9)
RBC # BLD AUTO: 4.5 10E6/UL (ref 4.4–5.9)
SODIUM SERPL-SCNC: 141 MMOL/L (ref 136–145)
SYSTOLIC BLOOD PRESSURE - MUSE: NORMAL MMHG
T AXIS - MUSE: 46 DEGREES
TACROLIMUS BLD-MCNC: 22.1 UG/L (ref 5–15)
TME LAST DOSE: ABNORMAL H
TME LAST DOSE: ABNORMAL H
UFH PPP CHRO-ACNC: 0.81 IU/ML
VENTRICULAR RATE- MUSE: 78 BPM
WBC # BLD AUTO: 6.5 10E3/UL (ref 4–11)
WBC # BLD AUTO: 8.2 10E3/UL (ref 4–11)

## 2023-03-11 PROCEDURE — 83880 ASSAY OF NATRIURETIC PEPTIDE: CPT

## 2023-03-11 PROCEDURE — 94668 MNPJ CHEST WALL SBSQ: CPT

## 2023-03-11 PROCEDURE — 250N000012 HC RX MED GY IP 250 OP 636 PS 637: Performed by: STUDENT IN AN ORGANIZED HEALTH CARE EDUCATION/TRAINING PROGRAM

## 2023-03-11 PROCEDURE — 83735 ASSAY OF MAGNESIUM: CPT | Performed by: INTERNAL MEDICINE

## 2023-03-11 PROCEDURE — 84145 PROCALCITONIN (PCT): CPT

## 2023-03-11 PROCEDURE — 84100 ASSAY OF PHOSPHORUS: CPT

## 2023-03-11 PROCEDURE — 94660 CPAP INITIATION&MGMT: CPT

## 2023-03-11 PROCEDURE — 36415 COLL VENOUS BLD VENIPUNCTURE: CPT | Performed by: PHYSICIAN ASSISTANT

## 2023-03-11 PROCEDURE — 999N000157 HC STATISTIC RCP TIME EA 10 MIN

## 2023-03-11 PROCEDURE — 250N000011 HC RX IP 250 OP 636

## 2023-03-11 PROCEDURE — 36415 COLL VENOUS BLD VENIPUNCTURE: CPT | Performed by: INTERNAL MEDICINE

## 2023-03-11 PROCEDURE — 83735 ASSAY OF MAGNESIUM: CPT

## 2023-03-11 PROCEDURE — 36415 COLL VENOUS BLD VENIPUNCTURE: CPT

## 2023-03-11 PROCEDURE — 250N000013 HC RX MED GY IP 250 OP 250 PS 637

## 2023-03-11 PROCEDURE — 93005 ELECTROCARDIOGRAM TRACING: CPT

## 2023-03-11 PROCEDURE — 250N000011 HC RX IP 250 OP 636: Performed by: INTERNAL MEDICINE

## 2023-03-11 PROCEDURE — 99233 SBSQ HOSP IP/OBS HIGH 50: CPT | Mod: GC | Performed by: STUDENT IN AN ORGANIZED HEALTH CARE EDUCATION/TRAINING PROGRAM

## 2023-03-11 PROCEDURE — 93010 ELECTROCARDIOGRAM REPORT: CPT | Performed by: INTERNAL MEDICINE

## 2023-03-11 PROCEDURE — 214N000001 HC R&B CCU UMMC

## 2023-03-11 PROCEDURE — 80197 ASSAY OF TACROLIMUS: CPT | Performed by: PHYSICIAN ASSISTANT

## 2023-03-11 PROCEDURE — 250N000013 HC RX MED GY IP 250 OP 250 PS 637: Performed by: STUDENT IN AN ORGANIZED HEALTH CARE EDUCATION/TRAINING PROGRAM

## 2023-03-11 PROCEDURE — 250N000009 HC RX 250: Performed by: INTERNAL MEDICINE

## 2023-03-11 PROCEDURE — 83605 ASSAY OF LACTIC ACID: CPT | Performed by: INTERNAL MEDICINE

## 2023-03-11 PROCEDURE — 71045 X-RAY EXAM CHEST 1 VIEW: CPT | Mod: 26 | Performed by: STUDENT IN AN ORGANIZED HEALTH CARE EDUCATION/TRAINING PROGRAM

## 2023-03-11 PROCEDURE — 80053 COMPREHEN METABOLIC PANEL: CPT

## 2023-03-11 PROCEDURE — 94640 AIRWAY INHALATION TREATMENT: CPT | Mod: 76

## 2023-03-11 PROCEDURE — 85610 PROTHROMBIN TIME: CPT | Performed by: STUDENT IN AN ORGANIZED HEALTH CARE EDUCATION/TRAINING PROGRAM

## 2023-03-11 PROCEDURE — 85027 COMPLETE CBC AUTOMATED: CPT

## 2023-03-11 PROCEDURE — 99233 SBSQ HOSP IP/OBS HIGH 50: CPT | Performed by: PHYSICIAN ASSISTANT

## 2023-03-11 PROCEDURE — 82803 BLOOD GASES ANY COMBINATION: CPT

## 2023-03-11 PROCEDURE — 85520 HEPARIN ASSAY: CPT

## 2023-03-11 PROCEDURE — 250N000012 HC RX MED GY IP 250 OP 636 PS 637: Performed by: PHYSICIAN ASSISTANT

## 2023-03-11 PROCEDURE — 85027 COMPLETE CBC AUTOMATED: CPT | Performed by: STUDENT IN AN ORGANIZED HEALTH CARE EDUCATION/TRAINING PROGRAM

## 2023-03-11 PROCEDURE — 71045 X-RAY EXAM CHEST 1 VIEW: CPT

## 2023-03-11 RX ORDER — WARFARIN SODIUM 1 MG/1
2 TABLET ORAL
Status: COMPLETED | OUTPATIENT
Start: 2023-03-11 | End: 2023-03-11

## 2023-03-11 RX ORDER — MAGNESIUM SULFATE HEPTAHYDRATE 40 MG/ML
4 INJECTION, SOLUTION INTRAVENOUS ONCE
Status: COMPLETED | OUTPATIENT
Start: 2023-03-11 | End: 2023-03-11

## 2023-03-11 RX ADMIN — LAMOTRIGINE 50 MG: 25 TABLET ORAL at 20:03

## 2023-03-11 RX ADMIN — SODIUM CHLORIDE SOLN NEBU 3% 4 ML: 3 NEBU SOLN at 08:33

## 2023-03-11 RX ADMIN — INSULIN ASPART 1 UNITS: 100 INJECTION, SOLUTION INTRAVENOUS; SUBCUTANEOUS at 10:23

## 2023-03-11 RX ADMIN — HEPARIN SODIUM 850 UNITS/HR: 10000 INJECTION, SOLUTION INTRAVENOUS at 06:14

## 2023-03-11 RX ADMIN — PREDNISONE 5 MG: 5 TABLET ORAL at 10:01

## 2023-03-11 RX ADMIN — PANTOPRAZOLE SODIUM 40 MG: 40 TABLET, DELAYED RELEASE ORAL at 10:01

## 2023-03-11 RX ADMIN — SODIUM CHLORIDE SOLN NEBU 3% 4 ML: 3 NEBU SOLN at 13:01

## 2023-03-11 RX ADMIN — WARFARIN SODIUM 2 MG: 1 TABLET ORAL at 17:36

## 2023-03-11 RX ADMIN — TAMSULOSIN HYDROCHLORIDE 0.4 MG: 0.4 CAPSULE ORAL at 17:36

## 2023-03-11 RX ADMIN — SODIUM CHLORIDE SOLN NEBU 3% 4 ML: 3 NEBU SOLN at 20:24

## 2023-03-11 RX ADMIN — LEVALBUTEROL HYDROCHLORIDE 1.25 MG: 1.25 SOLUTION RESPIRATORY (INHALATION) at 20:24

## 2023-03-11 RX ADMIN — AMOXICILLIN AND CLAVULANATE POTASSIUM 1 TABLET: 875; 125 TABLET ORAL at 20:02

## 2023-03-11 RX ADMIN — VORICONAZOLE 250 MG: 200 TABLET ORAL at 20:02

## 2023-03-11 RX ADMIN — AMOXICILLIN AND CLAVULANATE POTASSIUM 1 TABLET: 875; 125 TABLET ORAL at 10:02

## 2023-03-11 RX ADMIN — INSULIN ASPART 1 UNITS: 100 INJECTION, SOLUTION INTRAVENOUS; SUBCUTANEOUS at 18:05

## 2023-03-11 RX ADMIN — MAGNESIUM SULFATE IN WATER 4 G: 40 INJECTION, SOLUTION INTRAVENOUS at 10:08

## 2023-03-11 RX ADMIN — LEVOTHYROXINE SODIUM 25 MCG: 0.03 TABLET ORAL at 10:02

## 2023-03-11 RX ADMIN — TACROLIMUS 1 MG: 1 CAPSULE ORAL at 10:03

## 2023-03-11 RX ADMIN — FLUOXETINE 20 MG: 20 CAPSULE ORAL at 10:01

## 2023-03-11 RX ADMIN — PREDNISONE 2.5 MG: 2.5 TABLET ORAL at 17:36

## 2023-03-11 RX ADMIN — SULFAMETHOXAZOLE AND TRIMETHOPRIM 1 TABLET: 400; 80 TABLET ORAL at 10:02

## 2023-03-11 RX ADMIN — AMLODIPINE BESYLATE 10 MG: 10 TABLET ORAL at 10:02

## 2023-03-11 RX ADMIN — LEVALBUTEROL HYDROCHLORIDE 1.25 MG: 1.25 SOLUTION RESPIRATORY (INHALATION) at 08:32

## 2023-03-11 RX ADMIN — INSULIN ASPART 1 UNITS: 100 INJECTION, SOLUTION INTRAVENOUS; SUBCUTANEOUS at 14:48

## 2023-03-11 RX ADMIN — MIRTAZAPINE 7.5 MG: 7.5 TABLET, FILM COATED ORAL at 21:24

## 2023-03-11 RX ADMIN — VORICONAZOLE 250 MG: 200 TABLET ORAL at 10:03

## 2023-03-11 RX ADMIN — LEVALBUTEROL HYDROCHLORIDE 1.25 MG: 1.25 SOLUTION RESPIRATORY (INHALATION) at 13:01

## 2023-03-11 ASSESSMENT — ACTIVITIES OF DAILY LIVING (ADL)
ADLS_ACUITY_SCORE: 30
ADLS_ACUITY_SCORE: 30
ADLS_ACUITY_SCORE: 32
ADLS_ACUITY_SCORE: 30
ADLS_ACUITY_SCORE: 32
ADLS_ACUITY_SCORE: 32
ADLS_ACUITY_SCORE: 30
ADLS_ACUITY_SCORE: 32
ADLS_ACUITY_SCORE: 30
ADLS_ACUITY_SCORE: 32

## 2023-03-11 NOTE — PLAN OF CARE
D: PMH of bilateral lung transplant 2/2 NSIP. Transferred from [facility] after hypoxic respiratory failure in the setting of persistent COVID positive testing and imaging concerning for PE while taking therapeutic coumadin.    A:   Neuro: A/Ox4. Calls appropriately. Cooperative with cares. Denies headache, dizziness, and lightheadedness.  Cardiac/Tele: VSS. No tele order. HR 60's-90's. Elevated BP's. Afebrile. Denies chest pain.   Respiratory: Sats >94% on 2L O2 via NC while awake. Bi-pap used overnight.  GI/: Continent. Up to bathroom independently. Last BM 3/10 per pt.  Diet/Appetite: Regular diet. ACHS BG.  Skin: Old IV infiltration site to R arm with large bruise and painful to touch.   LDAs: L PIV- SL. L PIV- Infused Heparin at 850 units/hr overnight. Per protocol, Heparin paused at 0615 for 1 hour d/t elevated Anti Xa level. To be subtracted by 100 unit(s)/hr and restarted at 0715.   Activity: SBA  Pain: Denies     P: Continue to monitor and notify [facility] with changes.

## 2023-03-11 NOTE — PROGRESS NOTES
Shift: 0700 - 1930     57 year old male admitted on 3/6/2023. 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 is a 57 year old male admitted on 3/6/2023. 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 Patient has been improving on Remdesivr, has been breathing comfortably on room air with saturation >90%, regarding PE results are indeterminate, but not worried acutely about immediate recurrence or continued suspicion for current presentation being representative.     VS: Temp: 98.2  F (36.8  C) Temp src: Oral BP: (!) 149/78 Pulse: 100   Resp: 20 SpO2: 94 % O2 Device: None (Room air) Oxygen Delivery: 2 LPM     Pain: denies pain.   Neuro: A&Ox4. Tremors. Calls appropriately.   Cardiac:   No tele orders. Tachycardic. Heparin infusing at 850 units/hr, Hep10a recheck in AM.   Respiratory: Lung sounds diminished on 2L NC, Cpap at night. MYERS. Home O2 assessment this shift.   GI/Diet/Appetite: Regular diet w/ good appetite. BM 3/9.   :  Voiding w/o difficulty.   LDA's: PIV to LUE, infusing heparin.   Skin: RUE infiltrated 3/9, ecchymotic, edema, denies pain. Otherwise, skin is intact.   Activity: Up ad [place] in room.   Tests/Procedures:   Pertinent Labs/Lab Collection:      Plan: Possible discharge home 3/11.

## 2023-03-11 NOTE — PROGRESS NOTES
Pulmonary Medicine  Cystic Fibrosis - Lung Transplant Team  Progress Note  2023       Patient: Edson Thornton Jr.  MRN: [id number]  : 1965 (age 57 year old)  Transplant: 10/16/2021 (Lung), POD#[id number]  Admission date: 3/6/2023    Assessment & Plan:     Edson Thornton Jr. is a 57 year old male with h/o BSLT for NSIP/ILD (10/16/2021), bronchiectasis, moderate PH, RA, SALTY, chronic HSV infection, hypogammaglobulinemia, steroid-induced diabetes, hypothyroidism, PFO, HTN, HLD, duodenal anomaly, anxiety, and depression.  Post-op course complicated by encephalopathy and diffuse weakness, acute to subacute CVA, afib with RVR, DVT, BRENNAN, GI bleed, and Candidemia/Candida empyema.  The patient was admitted from OSH on 3/6 for acute hypoxic respiratory failure in setting of COVID infection and PE.  Weaned to RA during the day 3/6 and overnight (with BiPAP adjustments) 3/8. Planning for discharge tomorrow.     Recommendations:  - Hold tacrolimus dose this evening and tomorrow morning with dosage plan at discharge to follow, likely 0.5 mg BID  - Repeat level tomorrow am (ordered)  - Please repeat exercise oximetry tomorrow morning prior to discharge  - Recommend discharge on 3% HTS and levalbuterol TID with a flutter valve   - Will discharge on voriconazole (likely 3 months) and Augmentin (through 3/15)  - Hold Myfortic at discharge  - Will try to reschedule pulmonary follow up for ~2 weeks from discharge (around this time repeat chest CT without contrast, revisit resuming Myfortic, and repeat CMV for close monitoring)     Acute hypoxic respiratory failure:  COVID infection:   Initial COVID infection on , some mild improvement and then represented and received steroids and 2 weeks of levaquin without improvement. Represented on 3/5 with increased MYERS and hypoxia requiring 4L NC and COVID CT was 20.2 on 3/9. S/p another 5 days of remdesivir at [facility] finished on 3/9. Sputum growing aspergillus and rothia  mucilaginosa and started on voriconazole and Augmentin per ID. CT chest with evidence of peripheral nodular opacities which are felt to represent COVID but may also be post COVID . Currently between RA-2L with sleep (uses a BiPAP at home), feels like he could be more on RA currently.     - Bacterial sputum culture (3/8) with Aspergillus fumigatus and rothia mucilaginosa  - Fungal sputum culture (3/8) with C. Albicans and Aspergillus  - BDG fungitell positive (3/8) positive at 95 and Aspergillus galactomannan (3/8) positive at 1.83  - Voriconazole started 3/9 per TxpID  - Augmentin x 5 days (3/10-3/15) per transplant ID  - Nebs: Xopenex TID and 3% HTS  - Aerobika and IS hourly while awake  - Supplemental oxygen as needed to maintain SpO2 >92%; please repeat exercise study morning of discharge  - Repeat CT chest without contrast in ~2 weeks (around time of pulmonary follow up)  - AC management per hematology and primary team    Concern for PE: Initial scan done at OSH with two small RUL filling defects, but in review with radiology (Dr. Lindsay) here, it is unclear that this is not artifact given type of scanner used. He has been therapeutic on warfarin prior to admission and this is not the cause of his hypoxia. LE dopplers negative  - Anticoagulation per primary  - If acute increase in oxygenation or tachycardia without clear etiology would repeat CTA flash for definitive evaluation of PE presence     S/p bilateral sequential lung transplant (BSLT) for NSIP/ILD: Seen in pulmonary clinic 12/20/22 with intermittent dyspnea, PFTs with marked improvement and no acute changes on CXR.  EBV 3/7 negative.   - Pulmonary follow up currently scheduled 3/28 with repeat CT and resumption of myfortic consideration along with IgG     Immunosuppression:  - Tacrolimus 1.5 mg qAM / 1 mg qPM.  Goal level 8-10.  Level today of 22.1 at 11 hours, will hold evening dose and tomorrow am dose and determine next dosing based off level,  "likely 0.5 mg BID  - Myfortic held 3/5 in setting of COVID (prior dose 720 mg BID), revisit resuming in ~2 weeks in outpatient  - Prednisone 5 mg qAM / 2.5 mg qPM     Prophylaxis:   - Bactrim for PJP ppx  - CMV D-/R-, although with recent CMV <35 on 1/27 (previously not detected) which raises the question of seroconversion (will keep this in mind if started on increased steroids in future), repeat IgG and quant negative 3/7     Positive crossmatch:   Positive DSA: Had a retrospective positive crossmatch following transplantation, attributed to receiving rituximab.  DSA initially positive 11/29/21.  Most recent DSA on 3/8 remains positive (DQ:5) with mfi down to 551.  - Recheck ~ 4/8     Hypogammaglobulinemia: IgG adequate at 502 on 1/3.  Repeat IgG low at 168 on 3/7, s/p IVIG on 3/8.   - Repeat IgG ~ 4/8      SALTY: Continue PTA NIPPV overnight (see RT note 3/9 for details).    We appreciate the excellent care provided by the Medicine [facility] team.  Recommendations communicated via telephone and this note.  Will continue to follow along closely, please do not hesitate to call with any questions or concerns.    Angela Luna PA-C  [phone]     Subjective & Interval History:     Feeling well today, didn't use O2 when he went to the bathroom and denies shortness of breath. No fever or chills, decreasing productive cough, some chest congestion and tightness, but overall improved as well. Denies nausea or bloating, stools are soft.     Review of Systems:     Please see interval history, otherwise 10 point review is negative.     Physical Exam:     All notes, images, and labs from past 24 hours (at minimum) were reviewed.    Vital signs:  Temp: 98  F (36.7  C) Temp src: Oral BP: (!) 149/81 Pulse: 65   Resp: 16 SpO2: 99 % O2 Device: BiPAP/CPAP Oxygen Delivery: 2 LPM Height: 162.6 cm (5' 4\") Weight: 70 kg (154 lb 6.4 oz)  I/O:       Intake/Output Summary (Last 24 hours) at 3/10/2023 1413  Last data filed at 3/10/2023 1000  Gross " per 24 hour   Intake 675.1 ml   Output 875 ml   Net -199.9 ml       Constitutional: Sitting up in bed, in no apparent distress  HEENT: Eyes with pink conjunctivae, anicteric.  Oral mucosa moist without lesions  PULM: Moderate airflow, scattered crackles with expiratory wheeze  CV: Normal S1 and S2.  RRR.  No murmur, no peripheral edema.   ABD: NABS, soft, nontender, nondistended    MSK: Moves all extremities.  No apparent muscle wasting  NEURO: Sleepy although easily wakes to voice, conversant and answering questions appropriately  SKIN: Warm, dry.  No rash on limited exam   PSYCH: Mood stable    Lines, Drains, and Devices:  Peripheral IV 03/06/23 Anterior;Right Upper forearm (Active)   Site Assessment L 03/09/23 0417   Line Status Other (Comment) 03/09/23 0417   Phlebitis Scale 0-->no symptoms 03/09/23 0417   Infiltration Scale 0 03/09/23 0417   Number of days: 3       Peripheral IV 03/07/23 Left;Posterior Lower forearm (Active)   Site Assessment [facility] 03/09/23 0417   Line Status Infusing 03/09/23 0417   Phlebitis Scale 0-->no symptoms 03/09/23 0417   Infiltration Scale 0 03/09/23 0417   Number of days: 2     Data:     LABS    CMP:   Recent Labs   Lab 03/11/23  0524 03/10/23  2341 03/10/23  2103 03/10/23  1347 03/10/23  1212 03/10/23  0717 03/09/23  2126 03/09/23  2120 03/09/23  1643 03/09/23  1524 03/09/23  1414 03/09/23  0706 03/08/23  1000 03/08/23  0624 03/06/23  1845 03/06/23  1843     --   --   --   --  140  --   --   --   --   --  139  --  139   < > 138   POTASSIUM 4.5  --   --   --   --  4.2  --   --   --   --   --  3.6  --  4.3   < > 4.9   CHLORIDE 106  --   --   --   --  106  --   --   --   --   --  108*  --  109*   < > 105   CO2 24  --   --   --   --  23  --   --   --   --   --  19*  --  19*   < > 21*   ANIONGAP 11  --   --   --   --  11  --   --   --   --   --  12  --  11   < > 12   * 223* 208* 122*   < > 129*   < >  --    < >  --    < > 191*   < > 257*   < > 160*   BUN 19.8  --   --   --    --  16.2  --   --   --   --   --  16.5  --  18.5   < > 18.6   CR 1.14  --   --   --   --  0.95  --   --   --   --   --  0.93  --  0.95   < > 1.10   GFRESTIMATED 75  --   --   --   --  >90  --   --   --   --   --  >90  --  >90   < > 78   ERIK 8.9  --   --   --   --  8.0*  --   --   --   --   --  8.7  --  8.5*   < > 8.7   MAG 1.4*  --   --   --   --  2.1  --   --   --  2.3  --  1.1*  --   --   --   --    PHOS 4.3  --   --   --   --  3.8  --  2.4*  --   --   --  1.7*  --   --   --   --    PROTTOTAL  --   --   --   --   --   --   --   --   --   --   --   --   --   --   --  5.4*   ALBUMIN  --   --   --   --   --   --   --   --   --   --   --   --   --   --   --  3.4*   BILITOTAL  --   --   --   --   --   --   --   --   --   --   --   --   --   --   --  0.2   ALKPHOS  --   --   --   --   --   --   --   --   --   --   --   --   --   --   --  71   AST  --   --   --   --   --   --   --   --   --   --   --   --   --   --   --  32   ALT  --   --   --   --   --   --   --   --   --   --   --   --   --   --   --  23    < > = values in this interval not displayed.     CBC:   Recent Labs   Lab 03/10/23  0717 03/09/23  0706 03/08/23  0624 03/07/23  0626   WBC 5.0 5.3 5.2 6.9   RBC 4.22* 4.36* 4.01* 4.29*   HGB 10.5* 10.9* 10.0* 10.7*   HCT 34.4* 35.2* 33.0* 35.4*   MCV 82 81 82 83   MCH 24.9* 25.0* 24.9* 24.9*   MCHC 30.5* 31.0* 30.3* 30.2*   RDW 16.1* 15.9* 15.9* 15.9*   * 127* 132* 154       INR:   Recent Labs   Lab 03/10/23  0717 03/09/23  0706 03/08/23  1017 03/06/23  1843   INR 2.23* 1.79* 2.09* 2.57*       Glucose:   Recent Labs   Lab 03/11/23  0524 03/10/23  2341 03/10/23  2103 03/10/23  1347 03/10/23  1212 03/10/23  0717   * 223* 208* 122* 109* 129*       Blood Gas: No lab results found in last 7 days.    Culture Data No results for input(s): CULT in the last 168 hours.    Virology Data:   Lab Results   Component Value Date    FLUAH1 Not Detected 06/21/2022    FLUAH3 Not Detected 06/21/2022    BT5652 Not  Detected 06/21/2022    IFLUB Not Detected 06/21/2022    RSVA Not Detected 06/21/2022    RSVB Not Detected 06/21/2022    PIV1 Not Detected 06/21/2022    PIV2 Not Detected 06/21/2022    PIV3 Not Detected 06/21/2022    HMPV Not Detected 06/21/2022    HRVS Negative 04/19/2022    ADVBE Negative 04/19/2022    ADVC Negative 04/19/2022    ADVC Negative 11/22/2021    ADVC Negative 11/12/2021       Historical CMV results (last 3 of prior testing):  Lab Results   Component Value Date    CMVQNT Not Detected 03/07/2023    CMVQNT Not Detected 11/14/2022    CMVQNT Not Detected 09/12/2022     No results found for: CMVLOG    Urine Studies    Recent Labs   Lab Test 11/01/21  1336 10/25/21  1507   URINEPH 5.5 5.5   NITRITE Negative Negative   LEUKEST Negative Negative   WBCU 0 2       Most Recent [place] Pulmonary Function Testing (FVC/FEV1 only)  FVC-Pre   Date Value Ref Range Status   12/20/2022 2.46 L    11/14/2022 2.12 L    09/12/2022 2.27 L    06/20/2022 2.14 L      FVC-%Pred-Pre   Date Value Ref Range Status   12/20/2022 68 %    11/14/2022 54 %    09/12/2022 57 %    06/20/2022 54 %      FEV1-Pre   Date Value Ref Range Status   12/20/2022 1.61 L    11/14/2022 1.22 L    09/12/2022 1.42 L    06/20/2022 1.29 L      FEV1-%Pred-Pre   Date Value Ref Range Status   12/20/2022 56 %    11/14/2022 39 %    09/12/2022 45 %    06/20/2022 41 %        IMAGING    Recent Results (from the past 48 hour(s))   US Lower Extremity Venous Duplex Bilateral    Narrative    EXAMINATION: US LOWER EXTREMITY VENOUS DUPLEX BILATERAL  3/8/2023 5:26  PM      CLINICAL HISTORY: PE    COMPARISON: None        PROCEDURE COMMENTS: Ultrasound was performed of the deep venous system  of the right and left lower extremity using grayscale, color, and  spectral Doppler.    FINDINGS:  The common femoral, greater saphenous origin, femoral, popliteal, and  deep calf veins are visualized and are patent. Venous waveforms are  normal. There is normal response to compression.       Impression    IMPRESSION:.  No deep vein thrombosis in the right or left lower extremity.    I have personally reviewed the examination and initial interpretation  and I agree with the findings.    MAURICIO NAVAS MD         SYSTEM ID:  [id number]

## 2023-03-11 NOTE — PROGRESS NOTES
[facility]    Progress Note - Medicine Service, [facility] TEAM 1       Date of Admission:  3/6/2023    Assessment & Plan   Edson Thornotn Jr. is a 57 year old male admitted on 3/6/2023. He has a history of bilateral lung transplant 2/2 NSIP, afib (11/2021),  Who was transferred from [place] after hypoxic respiratory failure in the setting of persistent COVID positive testing and imaging concerning for PE while taking therapeutic coumadinEdson Thornton Jr. is a 57 year old male admitted on 3/6/2023. He has a history of bilateral lung transplant 2/2 NSIP, afib (11/2021),  Who was transferred from [place] after hypoxic respiratory failure in the setting of persistent COVID positive testing and imaging concerning for PE while taking therapeutic coumadin. Patient has been improving on Remdesivr, has been breathing comfortably on room air with saturation >90%, regarding PE results are indeterminate, but not worried acutely about immediate recurrence or continued suspicion for current presentation being representative.    Today:  -INR therapeutic X2, discontinue heparin   -Augmentin started for Rothia per transplant ID  -tacro (22) supra therapeutic: awaiting transplant pulm recs       #COVID Pneumonia  #Acute Hypoxic Respiratory Failure  #Possible Aspergillus Infection  #Rothia mucilaginosa Positive sputum culture   Patient has been breathing comfortably on room air with O2 saturation >92%. Endorses shortness of breath on exertion with light crackles and wheezing present bilaterally. Patient denies any shortness of breath on exertion, has been afebrile. Per ID recommendation will continue remdesivir 100mg for 5 days as patient showed improvement initially, discontinue dexamethasone and stop antibiotics as this is most likely viral with a low likely yang of bacterial or fungal infection. Per Transplant Pulmonary recommendations they don't believe this is a superimpose  bacterial infection given patient has unremarkable procalcitonin and no evidence of other physical exam findings, recommended starting xopenex for wheezing. Patients sputum cultures came back positive for septate hyphae and growing yeast concerning for aspergillus infection.  - Transplant ID consulted, appreciate recs   - Transplant pulm following, appreciate recs   - Xopenex neb TID  - Hold myfortic (held since 3/5), continue to hold on discharge   - S/p IVIG 3/8  - Completed Remdesivir 100mg IV for 5 days (3/5-3/9)  - Sputum culture positive for septate hyphae   - Start voriconazole 400mg x2 doses then 250mg BID    - Will need weekly LFTs   - Voriconazole trough level on 3/14/23  - On discharge continue xopenex followed by hypertonic saline nebulizer BID for 2 weeks   - Will need CT chest in 2 weeks and transplant pulm follow up   -Augmentin 875 mg for 5 days for rothia       #Apical Pulmonary Embolism  CTA obtained at outside hospital (3/2023) showed 2 small filling defects in the right apical pulmonary artery,suspicious for subtle acute pulmonary emboli. Patient was taking coumadin with INR in therapeutic window. Heme was consulted and is not convinced that this is an acute pulmonary embolism and recommended restarying PTA warfarin. Discussed with radiology, unable to determine definitely if artifact or PE given different CT machine. Will continue treating as true PE.   - continue on warfarin    - INR therapeutic, discontinue heparin gtt      #Lung Transplant (10/2021) 2/2 NISP   -pulmonary transplant consulted   -will continue PTA tacrolimus and prednisone and hold PTA MMF  -PTA Prednisone 5 in AM and 2.5 in PM  -PTA Tacrolimus 2 mg in AM and 5 in PM  -PTA Bactrim for PJP ppx      #Chest Pressure   Patient endorsed new onset of chest pain that started this AM (3/9), was not described as painful but noticeable pressure that is reporducible on palpation.Given patients recent concern for PE, but no other red flag  "signs we will monitor this for angina, effusion, with most likely cause being musculoskeletal related from deconditioning.  - Monitor with no immediate intervention indicated at this time     chronic conditions~      #HTN: Restarted PTA Norvasc 10mg   #Hypothyroidusm: PTA levo  #GERD: PTA Pantoprazole   #Insomnia: PTA Melatonin   #Steroid induced hyperglycemia: sliding scale insulin  #BPH: PTA Tamsulosin    #Mood Disorder: PTA Fluoxetine, PTA Mirtazapine, PTA Lamotrigine       Diet: Combination Diet Regular Diet Adult  DVT Prophylaxis: heparin gtt  Watson Catheter: Not present  Fluids: none  Cardiac Monitoring: None  Code Status: Full Code      Clinically Significant Risk Factors            # Hypomagnesemia: Lowest Mg = 1.4 mg/dL in last 2 days, will replace as needed   # Hypoalbuminemia: Lowest albumin = 3.4 g/dL at 3/6/2023  6:43 PM, will monitor as appropriate   # Thrombocytopenia: Lowest platelets = 119 in last 2 days, will monitor for bleeding         # Overweight: Estimated body mass index is 26.5 kg/m  as calculated from the following:    Height as of this encounter: 1.626 m (5' 4\").    Weight as of this encounter: 70 kg (154 lb 6.4 oz).          Disposition Plan     Expected Discharge Date: 03/12/2023        Discharge Comments: No transportation 3/11        The patient's care was discussed with the Attending Physician, Dr. Lobito Cheatham, Chief Resident/Fellow and Patient.    Franchesca Rubio PGY 3  Medicine Service, [facility] TEAM [address] [place] [id number]  Securely message with [employer] (more info)  Text page via [employer] Paging/Directory   See signed in provider for up to date coverage information       ______________________________________________________________________    Interval History   No major complaints.Overall feeling well. Continues to endorse some shortness of breath with exertion.     Physical Exam   Vital Signs: Temp: 98.3  F (36.8  C) Temp src: Oral BP: 139/81 Pulse: " 106   Resp: 18 SpO2: 95 % O2 Device: None (Room air) Oxygen Delivery: 2 LPM  Weight: 154 lbs 6.4 oz  General: no acute distress, sitting comfortably in bed on 2L NC, conversant   HEENT: NC, AT, EOMI  Cardiac: RRR, normal S1/S2, warm distal extremities   Lung: course crackles diffuse, predominantly in lower lobes, mild wheezing, on 2L NC   Abdomen:soft, nontender   Extremities: moving all extremities, no LE edema  Neuro: AAAX3, cranial nerves grossly intact       Medical Decision Making       Data   Labs and imaging reviewed by me.

## 2023-03-12 LAB
ALBUMIN SERPL BCG-MCNC: 3.4 G/DL (ref 3.5–5.2)
ALBUMIN UR-MCNC: 50 MG/DL
ALP SERPL-CCNC: 90 U/L (ref 40–129)
ALT SERPL W P-5'-P-CCNC: 60 U/L (ref 10–50)
ANION GAP SERPL CALCULATED.3IONS-SCNC: 11 MMOL/L (ref 7–15)
ANION GAP SERPL CALCULATED.3IONS-SCNC: 17 MMOL/L (ref 7–15)
APPEARANCE UR: CLEAR
AST SERPL W P-5'-P-CCNC: 38 U/L (ref 10–50)
BILIRUB SERPL-MCNC: 0.2 MG/DL
BILIRUB UR QL STRIP: NEGATIVE
BUN SERPL-MCNC: 20.8 MG/DL (ref 6–20)
BUN SERPL-MCNC: 21.7 MG/DL (ref 6–20)
CALCIUM SERPL-MCNC: 8.8 MG/DL (ref 8.6–10)
CALCIUM SERPL-MCNC: 9.3 MG/DL (ref 8.6–10)
CHLORIDE SERPL-SCNC: 104 MMOL/L (ref 98–107)
CHLORIDE SERPL-SCNC: 106 MMOL/L (ref 98–107)
COLOR UR AUTO: YELLOW
CREAT SERPL-MCNC: 1.31 MG/DL (ref 0.67–1.17)
CREAT SERPL-MCNC: 1.34 MG/DL (ref 0.67–1.17)
DEPRECATED HCO3 PLAS-SCNC: 19 MMOL/L (ref 22–29)
DEPRECATED HCO3 PLAS-SCNC: 22 MMOL/L (ref 22–29)
ERYTHROCYTE [DISTWIDTH] IN BLOOD BY AUTOMATED COUNT: 16.4 % (ref 10–15)
ERYTHROCYTE [DISTWIDTH] IN BLOOD BY AUTOMATED COUNT: 16.9 % (ref 10–15)
GFR SERPL CREATININE-BSD FRML MDRD: 62 ML/MIN/1.73M2
GFR SERPL CREATININE-BSD FRML MDRD: 63 ML/MIN/1.73M2
GLUCOSE BLDC GLUCOMTR-MCNC: 130 MG/DL (ref 70–99)
GLUCOSE BLDC GLUCOMTR-MCNC: 160 MG/DL (ref 70–99)
GLUCOSE BLDC GLUCOMTR-MCNC: 167 MG/DL (ref 70–99)
GLUCOSE BLDC GLUCOMTR-MCNC: 254 MG/DL (ref 70–99)
GLUCOSE SERPL-MCNC: 159 MG/DL (ref 70–99)
GLUCOSE SERPL-MCNC: 263 MG/DL (ref 70–99)
GLUCOSE UR STRIP-MCNC: 150 MG/DL
HCT VFR BLD AUTO: 32.1 % (ref 40–53)
HCT VFR BLD AUTO: 43.6 % (ref 40–53)
HGB BLD-MCNC: 12.5 G/DL (ref 13.3–17.7)
HGB BLD-MCNC: 9.8 G/DL (ref 13.3–17.7)
HGB UR QL STRIP: NEGATIVE
INR PPP: 3.09 (ref 0.85–1.15)
KETONES UR STRIP-MCNC: ABNORMAL MG/DL
LACTATE SERPL-SCNC: 1.3 MMOL/L (ref 0.7–2)
LACTATE SERPL-SCNC: 3.8 MMOL/L (ref 0.7–2)
LEUKOCYTE ESTERASE UR QL STRIP: NEGATIVE
MCH RBC QN AUTO: 24.6 PG (ref 26.5–33)
MCH RBC QN AUTO: 24.9 PG (ref 26.5–33)
MCHC RBC AUTO-ENTMCNC: 28.7 G/DL (ref 31.5–36.5)
MCHC RBC AUTO-ENTMCNC: 30.5 G/DL (ref 31.5–36.5)
MCV RBC AUTO: 81 FL (ref 78–100)
MCV RBC AUTO: 87 FL (ref 78–100)
MUCOUS THREADS #/AREA URNS LPF: PRESENT /LPF
NITRATE UR QL: NEGATIVE
NT-PROBNP SERPL-MCNC: 484 PG/ML (ref 0–900)
PH UR STRIP: 5.5 [PH] (ref 5–7)
PLATELET # BLD AUTO: 105 10E3/UL (ref 150–450)
PLATELET # BLD AUTO: 124 10E3/UL (ref 150–450)
POTASSIUM SERPL-SCNC: 4.3 MMOL/L (ref 3.4–5.3)
POTASSIUM SERPL-SCNC: 4.6 MMOL/L (ref 3.4–5.3)
PROCALCITONIN SERPL IA-MCNC: 0.1 NG/ML
PROT SERPL-MCNC: 6.3 G/DL (ref 6.4–8.3)
RBC # BLD AUTO: 3.98 10E6/UL (ref 4.4–5.9)
RBC # BLD AUTO: 5.03 10E6/UL (ref 4.4–5.9)
RBC URINE: 0 /HPF
SODIUM SERPL-SCNC: 139 MMOL/L (ref 136–145)
SODIUM SERPL-SCNC: 140 MMOL/L (ref 136–145)
SP GR UR STRIP: 1.03 (ref 1–1.03)
TACROLIMUS BLD-MCNC: 22 UG/L (ref 5–15)
TME LAST DOSE: ABNORMAL H
TME LAST DOSE: ABNORMAL H
UROBILINOGEN UR STRIP-MCNC: NORMAL MG/DL
WBC # BLD AUTO: 6.5 10E3/UL (ref 4–11)
WBC # BLD AUTO: 6.6 10E3/UL (ref 4–11)
WBC URINE: 2 /HPF

## 2023-03-12 PROCEDURE — 36415 COLL VENOUS BLD VENIPUNCTURE: CPT | Performed by: PHYSICIAN ASSISTANT

## 2023-03-12 PROCEDURE — 250N000013 HC RX MED GY IP 250 OP 250 PS 637: Performed by: STUDENT IN AN ORGANIZED HEALTH CARE EDUCATION/TRAINING PROGRAM

## 2023-03-12 PROCEDURE — 999N000157 HC STATISTIC RCP TIME EA 10 MIN

## 2023-03-12 PROCEDURE — 80197 ASSAY OF TACROLIMUS: CPT | Performed by: PHYSICIAN ASSISTANT

## 2023-03-12 PROCEDURE — 250N000009 HC RX 250: Performed by: INTERNAL MEDICINE

## 2023-03-12 PROCEDURE — 80048 BASIC METABOLIC PNL TOTAL CA: CPT | Performed by: STUDENT IN AN ORGANIZED HEALTH CARE EDUCATION/TRAINING PROGRAM

## 2023-03-12 PROCEDURE — 85027 COMPLETE CBC AUTOMATED: CPT | Performed by: STUDENT IN AN ORGANIZED HEALTH CARE EDUCATION/TRAINING PROGRAM

## 2023-03-12 PROCEDURE — 250N000012 HC RX MED GY IP 250 OP 636 PS 637: Performed by: STUDENT IN AN ORGANIZED HEALTH CARE EDUCATION/TRAINING PROGRAM

## 2023-03-12 PROCEDURE — 258N000003 HC RX IP 258 OP 636

## 2023-03-12 PROCEDURE — 99233 SBSQ HOSP IP/OBS HIGH 50: CPT | Mod: GC | Performed by: STUDENT IN AN ORGANIZED HEALTH CARE EDUCATION/TRAINING PROGRAM

## 2023-03-12 PROCEDURE — 83605 ASSAY OF LACTIC ACID: CPT | Performed by: STUDENT IN AN ORGANIZED HEALTH CARE EDUCATION/TRAINING PROGRAM

## 2023-03-12 PROCEDURE — 36415 COLL VENOUS BLD VENIPUNCTURE: CPT

## 2023-03-12 PROCEDURE — 83605 ASSAY OF LACTIC ACID: CPT

## 2023-03-12 PROCEDURE — 94640 AIRWAY INHALATION TREATMENT: CPT | Mod: 76

## 2023-03-12 PROCEDURE — 99233 SBSQ HOSP IP/OBS HIGH 50: CPT | Performed by: PHYSICIAN ASSISTANT

## 2023-03-12 PROCEDURE — 85027 COMPLETE CBC AUTOMATED: CPT

## 2023-03-12 PROCEDURE — 250N000013 HC RX MED GY IP 250 OP 250 PS 637

## 2023-03-12 PROCEDURE — 94640 AIRWAY INHALATION TREATMENT: CPT

## 2023-03-12 PROCEDURE — 87107 FUNGI IDENTIFICATION MOLD: CPT | Performed by: PHYSICIAN ASSISTANT

## 2023-03-12 PROCEDURE — 214N000001 HC R&B CCU UMMC

## 2023-03-12 PROCEDURE — 87205 SMEAR GRAM STAIN: CPT | Performed by: PHYSICIAN ASSISTANT

## 2023-03-12 PROCEDURE — 94660 CPAP INITIATION&MGMT: CPT

## 2023-03-12 PROCEDURE — 36415 COLL VENOUS BLD VENIPUNCTURE: CPT | Performed by: STUDENT IN AN ORGANIZED HEALTH CARE EDUCATION/TRAINING PROGRAM

## 2023-03-12 PROCEDURE — 85610 PROTHROMBIN TIME: CPT | Performed by: STUDENT IN AN ORGANIZED HEALTH CARE EDUCATION/TRAINING PROGRAM

## 2023-03-12 PROCEDURE — 94668 MNPJ CHEST WALL SBSQ: CPT

## 2023-03-12 PROCEDURE — 81001 URINALYSIS AUTO W/SCOPE: CPT

## 2023-03-12 RX ADMIN — FLUOXETINE 20 MG: 20 CAPSULE ORAL at 08:02

## 2023-03-12 RX ADMIN — PREDNISONE 5 MG: 5 TABLET ORAL at 08:03

## 2023-03-12 RX ADMIN — PREDNISONE 2.5 MG: 2.5 TABLET ORAL at 17:57

## 2023-03-12 RX ADMIN — WARFARIN SODIUM 0.5 MG: 1 TABLET ORAL at 17:57

## 2023-03-12 RX ADMIN — VORICONAZOLE 250 MG: 200 TABLET ORAL at 08:02

## 2023-03-12 RX ADMIN — LAMOTRIGINE 50 MG: 25 TABLET ORAL at 20:03

## 2023-03-12 RX ADMIN — SODIUM CHLORIDE, POTASSIUM CHLORIDE, SODIUM LACTATE AND CALCIUM CHLORIDE 500 ML: 600; 310; 30; 20 INJECTION, SOLUTION INTRAVENOUS at 03:55

## 2023-03-12 RX ADMIN — MIRTAZAPINE 7.5 MG: 7.5 TABLET, FILM COATED ORAL at 22:55

## 2023-03-12 RX ADMIN — INSULIN ASPART 1 UNITS: 100 INJECTION, SOLUTION INTRAVENOUS; SUBCUTANEOUS at 14:35

## 2023-03-12 RX ADMIN — LEVALBUTEROL HYDROCHLORIDE 1.25 MG: 1.25 SOLUTION RESPIRATORY (INHALATION) at 08:21

## 2023-03-12 RX ADMIN — PANTOPRAZOLE SODIUM 40 MG: 40 TABLET, DELAYED RELEASE ORAL at 08:02

## 2023-03-12 RX ADMIN — LEVALBUTEROL HYDROCHLORIDE 1.25 MG: 1.25 SOLUTION RESPIRATORY (INHALATION) at 13:05

## 2023-03-12 RX ADMIN — SULFAMETHOXAZOLE AND TRIMETHOPRIM 1 TABLET: 400; 80 TABLET ORAL at 08:02

## 2023-03-12 RX ADMIN — AMLODIPINE BESYLATE 10 MG: 10 TABLET ORAL at 08:02

## 2023-03-12 RX ADMIN — LEVOTHYROXINE SODIUM 25 MCG: 0.03 TABLET ORAL at 08:02

## 2023-03-12 RX ADMIN — INSULIN ASPART 1 UNITS: 100 INJECTION, SOLUTION INTRAVENOUS; SUBCUTANEOUS at 18:01

## 2023-03-12 RX ADMIN — AMOXICILLIN AND CLAVULANATE POTASSIUM 1 TABLET: 875; 125 TABLET ORAL at 08:02

## 2023-03-12 RX ADMIN — LEVALBUTEROL HYDROCHLORIDE 1.25 MG: 1.25 SOLUTION RESPIRATORY (INHALATION) at 20:09

## 2023-03-12 RX ADMIN — VORICONAZOLE 250 MG: 200 TABLET ORAL at 20:03

## 2023-03-12 RX ADMIN — AMOXICILLIN AND CLAVULANATE POTASSIUM 1 TABLET: 875; 125 TABLET ORAL at 20:03

## 2023-03-12 RX ADMIN — TAMSULOSIN HYDROCHLORIDE 0.4 MG: 0.4 CAPSULE ORAL at 17:57

## 2023-03-12 ASSESSMENT — ACTIVITIES OF DAILY LIVING (ADL)
ADLS_ACUITY_SCORE: 28
ADLS_ACUITY_SCORE: 32
ADLS_ACUITY_SCORE: 28

## 2023-03-12 NOTE — PROGRESS NOTES
Shift: 0700 - 1930     57 year old male admitted on 3/6/2023. He has a history of bilateral lung transplant 2/2 NSIP, afib (11/2021),  Who was transferred from [place] after hypoxic respiratory failure in the setting of persistent COVID positive testing and imaging concerning for PE while taking therapeutic coumadinEdson Thornton Jr. is a 57 year old male admitted on 3/6/2023. He has a history of bilateral lung transplant 2/2 NSIP, afib (11/2021),  Who was transferred from [place] after hypoxic respiratory failure in the setting of persistent COVID positive testing and imaging concerning for PE while taking therapeutic coumadin. Patient has been improving on Remdesivr, has been breathing comfortably on room air with saturation >90%, regarding PE results are indeterminate, but not worried acutely about immediate recurrence or continued suspicion for current presentation being representative.     VS: Temp: 99.1  F (37.3  C) Temp src: Oral BP: (!) 150/84 Pulse: 113   Resp: 18 SpO2: 94 % O2 Device: None (Room air) Oxygen Delivery: 2 LPM     Pain: denies pain.   Neuro: A&Ox4. Tremors. Calls appropriately.   Cardiac:   SR/ST. HTN.   Respiratory: Lung sounds crackles/expiratory wheeze/diminished, RA, Cpap at night. MYERS.   GI/Diet/Appetite: Regular diet w/ good appetite. BM 3/11.   :  Voiding w/o difficulty.   LDA's: PIV to LUE, SL.   Skin: RUE infiltrated 3/9, ecchymotic, edema, reports tenderness. Otherwise, skin is intact.   Activity: Up ad [place] in room.   Tests/Procedures:   Pertinent Labs/Lab Collection: Tacrolimus level critical value 22, Tacrolimus held. Sepsis triggered.      Plan: Continue to monitor. Sputum culture sent this shift.

## 2023-03-12 NOTE — PROGRESS NOTES
Pulmonary Medicine  Cystic Fibrosis - Lung Transplant Team  Progress Note  2023       Patient: Edson Thornton Jr.  MRN: [id number]  : 1965 (age 57 year old)  Transplant: 10/16/2021 (Lung), POD#512  Admission date: 3/6/2023    Assessment & Plan:     Edson Thornton Jr. is a 57 year old male with h/o BSLT for NSIP/ILD (10/16/2021), bronchiectasis, moderate PH, RA, SALTY, chronic HSV infection, hypogammaglobulinemia, steroid-induced diabetes, hypothyroidism, PFO, HTN, HLD, duodenal anomaly, anxiety, and depression.  Post-op course complicated by encephalopathy and diffuse weakness, acute to subacute CVA, afib with RVR, DVT, BRENNAN, GI bleed, and Candidemia/Candida empyema.  The patient was admitted from OSH on 3/6 for acute hypoxic respiratory failure in setting of COVID infection and PE.  Weaned to RA during the day 3/6 and overnight (with BiPAP adjustments) 3/8, now symptomatic from supra therapeutic tacrolimus level in setting of starting an azole. Also with new KARI patchy opacity. Discharge delayed to possibly Monday.      Recommendations:  - Hold tacrolimus doses (am and pm) again today and recheck level 3/13 (ordered) with dosage plan to follow    - Repeat sputum (bacterial) ordered  - Follow up CXR 3/13 (ordered)  - Hold off on further steroids for now given new Aspergillus  - Hold 3% HTS nebs, continue levalbuterol with flutter valve; encourage IS  - Please repeat exercise oximetry prior to discharge  - Will discharge on voriconazole (likely 3 months) and Augmentin (through 3/15)  - Hold Myfortic at discharge     Acute hypoxic respiratory failure:  COVID infection:   Initial COVID infection on , some mild improvement, then regression s/p steroids and 2 weeks of levaquin without improvement. Presented to OSH on 3/5 with increased MYERS and hypoxia requiring 4L NC and COVID CT was 20.2 on 3/9. S/p another 5 days of remdesivir at [facility] finished on 3/9. Sputum growing aspergillus and rothia  mucilaginosa and started on voriconazole and Augmentin per ID. CT chest with evidence of peripheral nodular opacities which are felt to represent COVID but may also be post COVID . Mainly on RA and BiPAP for sleep (uses a BiPAP at home), however, had a rough night last night with attempts at showering with increased dyspnea, so was placed on 2 L O2 and CXR obtained and reviewed by me which demonstrated small KARI opacity. Also with slightly more productive cough today and multiple symptoms associated with his tac level (tremors, dizziness, elevated BP and general feeling of being unwell).   - Repeat bacterial sputum culture ordered  - Bacterial sputum culture (3/8) with Aspergillus fumigatus and rothia mucilaginosa  - Fungal sputum culture (3/8) with C. Albicans and Aspergillus  - BDG fungitell positive (3/8) positive at 95 and Aspergillus galactomannan (3/8) positive at 1.83  - Voriconazole started 3/9 per TxpID, will need repeat labs and level 7-10 days from start  - Augmentin x 5 days (3/10-3/15) per transplant ID  - Nebs: Xopenex TID; hold 3% HTS as not sure if helping and could be worsening pulmonary symptoms  - Aerobika and IS hourly while awake  - Supplemental oxygen as needed to maintain SpO2 >92%; please repeat exercise study morning of discharge  - Repeat CT chest without contrast in ~2 weeks (around time of pulmonary follow up)  - AC management per hematology and primary team    Concern for PE: Initial scan done at OSH with two small RUL filling defects, but in review with radiology (Dr. Lindsay) here, it is unclear that this is not artifact given type of scanner used. He has been therapeutic on warfarin prior to admission and this is likely not the cause of his hypoxia. LE dopplers negative.   - If acute increase in oxygenation or tachycardia without clear etiology would repeat CTA flash for definitive evaluation of PE presence     S/p bilateral sequential lung transplant (BSLT) for NSIP/ILD: Seen in  pulmonary clinic 12/20/22 with intermittent dyspnea, PFTs with marked improvement and no acute changes on CXR.  EBV 3/7 negative.   - Pulmonary follow up currently scheduled 3/28 with repeat CT and resumption of myfortic consideration along with IgG     Immunosuppression:  - Tacrolimus 1.5 mg qAM / 1 mg qPM.  Goal level 8-10.  Level yesterday of 22.1 at 11 hours, evening am today's morning dose held. Repeat level this am still 22, will hold evening dose and tomorrow am dose again and recheck level Monday, 3/13 with dosing to follow  - Myfortic held 3/5 in setting of COVID (prior dose 720 mg BID), revisit resuming in ~2 weeks in outpatient  - Prednisone 5 mg qAM / 2.5 mg qPM     Prophylaxis:   - Bactrim for PJP ppx  - CMV D-/R-, although with recent CMV <35 on 1/27 (previously not detected) which raises the question of seroconversion (will keep this in mind if started on increased steroids in future), repeat IgG and quant negative 3/7     Positive crossmatch:   Positive DSA: Had a retrospective positive crossmatch following transplantation, attributed to receiving rituximab.  DSA initially positive 11/29/21.  Most recent DSA on 3/8 remains positive (DQ:5) with mfi down to 551.  - Recheck ~ 4/8     Hypogammaglobulinemia: IgG adequate at 502 on 1/3.  Repeat IgG low at 168 on 3/7, s/p IVIG on 3/8.   - Repeat IgG ~ 4/8      SALTY: Continue PTA NIPPV overnight (see RT note 3/9 for details).    We appreciate the excellent care provided by the Medicine [facility] team.  Recommendations communicated via telephone and this note.  Will continue to follow along closely, please do not hesitate to call with any questions or concerns.    Angela Luna PA-C  [id number]     Subjective & Interval History:     Rough evening/night. Attempted to take a shower, but felt too winded so he went back to bed. Tried again later and was able to take the shower but was very tired and short of breath afterward. Doesn't think he O2 level went down, but was  "but on 2 L by the RN. Triggered sepsis protocol, so had CXR. Denies fever or chills, but does feel he is having a slightly more productive cough, no congestion, but does feel tight. Overnight with increased tremors, shakiness and dizziness, slightly better today. No new GI complaints.     Review of Systems:     Please see interval history, otherwise 10 point review is negative.     Physical Exam:     All notes, images, and labs from past 24 hours (at minimum) were reviewed.    Vital signs:  Temp: 97.9  F (36.6  C) Temp src: Oral BP: (!) 159/92 Pulse: 75   Resp: 18 SpO2: 100 % O2 Device: BiPAP/CPAP Oxygen Delivery: 2 LPM Height: 162.6 cm (5' 4\") Weight: 70 kg (154 lb 6.4 oz)  I/O:       Intake/Output Summary (Last 24 hours) at 3/10/2023 1413  Last data filed at 3/10/2023 1000  Gross per 24 hour   Intake 675.1 ml   Output 875 ml   Net -199.9 ml       Constitutional: Sitting up in bed, in no apparent distress  HEENT: Eyes with pink conjunctivae, anicteric.  Oral mucosa moist without lesions  PULM: Moderate airflow, scattered basilar crackles with expiratory wheezing  CV: Normal S1 and S2.  RRR.  No murmur, no peripheral edema  ABD: NABS, soft, nontender, nondistended    MSK: Moves all extremities.  No apparent muscle wasting  NEURO: AAO, conversant  SKIN: Warm, dry.  No rash on limited exam   PSYCH: Mood stable    Lines, Drains, and Devices:  Peripheral IV 03/06/23 Anterior;Right Upper forearm (Active)   Site Assessment WDL 03/09/23 0417   Line Status Other (Comment) 03/09/23 0417   Phlebitis Scale 0-->no symptoms 03/09/23 0417   Infiltration Scale 0 03/09/23 0417   Number of days: 3       Peripheral IV 03/07/23 Left;Posterior Lower forearm (Active)   Site Assessment [facility] 03/09/23 0417   Line Status Infusing 03/09/23 0417   Phlebitis Scale 0-->no symptoms 03/09/23 0417   Infiltration Scale 0 03/09/23 0417   Number of days: 2     Data:     LABS    CMP:   Recent Labs   Lab 03/12/23  0544 03/11/23  2678 03/11/23  2128 " 03/11/23  1740 03/11/23  1410 03/11/23  1349 03/11/23  0905 03/11/23  0524 03/10/23  1212 03/10/23  0717 03/09/23 2126 03/09/23 2120 03/09/23  1643 03/09/23  1524 03/09/23  1414 03/09/23  0706 03/06/23  1845 03/06/23  1843    140  --   --   --   --   --  141  --  140  --   --   --   --   --  139   < > 138   POTASSIUM 4.6 4.3  --   --   --   --   --  4.5  --  4.2  --   --   --   --   --  3.6   < > 4.9   CHLORIDE 106 104  --   --   --   --   --  106  --  106  --   --   --   --   --  108*   < > 105   CO2 22 19*  --   --   --   --   --  24  --  23  --   --   --   --   --  19*   < > 21*   ANIONGAP 11 17*  --   --   --   --   --  11  --  11  --   --   --   --   --  12   < > 12   * 263* 226* 225*   < >  --    < > 185*   < > 129*   < >  --    < >  --    < > 191*   < > 160*   BUN 20.8* 21.7*  --   --   --   --   --  19.8  --  16.2  --   --   --   --   --  16.5   < > 18.6   CR 1.31* 1.34*  --   --   --   --   --  1.14  --  0.95  --   --   --   --   --  0.93   < > 1.10   GFRESTIMATED 63 62  --   --   --   --   --  75  --  >90  --   --   --   --   --  >90   < > 78   ERIK 8.8 9.3  --   --   --   --   --  8.9  --  8.0*  --   --   --   --   --  8.7   < > 8.7   MAG  --   --   --   --   --  2.7*  --  1.4*  --  2.1  --   --   --  2.3  --  1.1*   < >  --    PHOS  --   --   --   --   --   --   --  4.3  --  3.8  --  2.4*  --   --   --  1.7*  --   --    PROTTOTAL  --  6.3*  --   --   --   --   --   --   --   --   --   --   --   --   --   --   --  5.4*   ALBUMIN  --  3.4*  --   --   --   --   --   --   --   --   --   --   --   --   --   --   --  3.4*   BILITOTAL  --  0.2  --   --   --   --   --   --   --   --   --   --   --   --   --   --   --  0.2   ALKPHOS  --  90  --   --   --   --   --   --   --   --   --   --   --   --   --   --   --  71   AST  --  38  --   --   --   --   --   --   --   --   --   --   --   --   --   --   --  32   ALT  --  60*  --   --   --   --   --   --   --   --   --   --   --   --   --   --   --  23     < > = values in this interval not displayed.     CBC:   Recent Labs   Lab 03/12/23  0544 03/11/23  2327 03/11/23  1349 03/10/23  0717   WBC 6.6 8.2 6.5 5.0   RBC 3.98* 4.45 4.50 4.22*   HGB 9.8* 11.1* 11.0* 10.5*   HCT 32.1* 35.8* 36.6* 34.4*   MCV 81 80 81 82   MCH 24.6* 24.9* 24.4* 24.9*   MCHC 30.5* 31.0* 30.1* 30.5*   RDW 16.4* 16.5* 16.3* 16.1*   * 140* 108* 119*       INR:   Recent Labs   Lab 03/12/23  0544 03/11/23  0955 03/10/23  0717 03/09/23  0706   INR 3.09* 2.62* 2.23* 1.79*       Glucose:   Recent Labs   Lab 03/12/23  0544 03/11/23  2327 03/11/23  2128 03/11/23  1740 03/11/23  1410 03/11/23  1133   * 263* 226* 225* 190* 362*       Blood Gas:   Recent Labs   Lab 03/11/23 2327   PHV 7.44*   PCO2V 34*   PO2V 39   HCO3V 23   LORI -0.3   O2PER 21       Culture Data No results for input(s): CULT in the last 168 hours.    Virology Data:   Lab Results   Component Value Date    FLUAH1 Not Detected 06/21/2022    FLUAH3 Not Detected 06/21/2022    KK5032 Not Detected 06/21/2022    IFLUB Not Detected 06/21/2022    RSVA Not Detected 06/21/2022    RSVB Not Detected 06/21/2022    PIV1 Not Detected 06/21/2022    PIV2 Not Detected 06/21/2022    PIV3 Not Detected 06/21/2022    HMPV Not Detected 06/21/2022    HRVS Negative 04/19/2022    ADVBE Negative 04/19/2022    ADVC Negative 04/19/2022    ADVC Negative 11/22/2021    ADVC Negative 11/12/2021       Historical CMV results (last 3 of prior testing):  Lab Results   Component Value Date    CMVQNT Not Detected 03/07/2023    CMVQNT Not Detected 11/14/2022    CMVQNT Not Detected 09/12/2022     No results found for: CMVLOG    Urine Studies    Recent Labs   Lab Test 03/12/23  0307 11/01/21  1336   URINEPH 5.5 5.5   NITRITE Negative Negative   LEUKEST Negative Negative   WBCU 2 0       Most Recent [facility] Pulmonary Function Testing (FVC/FEV1 only)  FVC-Pre   Date Value Ref Range Status   12/20/2022 2.46 L    11/14/2022 2.12 L    09/12/2022 2.27 L    06/20/2022 2.14  L      FVC-%Pred-Pre   Date Value Ref Range Status   12/20/2022 68 %    11/14/2022 54 %    09/12/2022 57 %    06/20/2022 54 %      FEV1-Pre   Date Value Ref Range Status   12/20/2022 1.61 L    11/14/2022 1.22 L    09/12/2022 1.42 L    06/20/2022 1.29 L      FEV1-%Pred-Pre   Date Value Ref Range Status   12/20/2022 56 %    11/14/2022 39 %    09/12/2022 45 %    06/20/2022 41 %        IMAGING    Recent Results (from the past 48 hour(s))   US Lower Extremity Venous Duplex Bilateral    Narrative    EXAMINATION: US LOWER EXTREMITY VENOUS DUPLEX BILATERAL  3/8/2023 5:26  PM      CLINICAL HISTORY: PE    COMPARISON: None        PROCEDURE COMMENTS: Ultrasound was performed of the deep venous system  of the right and left lower extremity using grayscale, color, and  spectral Doppler.    FINDINGS:  The common femoral, greater saphenous origin, femoral, popliteal, and  deep calf veins are visualized and are patent. Venous waveforms are  normal. There is normal response to compression.      Impression    IMPRESSION:.  No deep vein thrombosis in the right or left lower extremity.    I have personally reviewed the examination and initial interpretation  and I agree with the findings.    MAURICIO NAVAS MD         SYSTEM ID:  [id number]

## 2023-03-12 NOTE — PROGRESS NOTES
Shift: 0700 - 1930     57 year old male admitted on 3/6/2023. He has a history of bilateral lung transplant 2/2 NSIP, afib (11/2021),  Who was transferred from [facility] after hypoxic respiratory failure in the setting of persistent COVID positive testing and imaging concerning for PE while taking therapeutic coumadinEdson Thornton Jr. is a 57 year old male admitted on 3/6/2023. He has a history of bilateral lung transplant 2/2 NSIP, afib (11/2021),  Who was transferred from [facility] after hypoxic respiratory failure in the setting of persistent COVID positive testing and imaging concerning for PE while taking therapeutic coumadin. Patient has been improving on Remdesivr, has been breathing comfortably on room air with saturation >90%, regarding PE results are indeterminate, but not worried acutely about immediate recurrence or continued suspicion for current presentation being representative.     VS: Temp: 98.6  F (37  C) Temp src: Oral BP: (!) 149/77 Pulse: (!) 121   Resp: 18 SpO2: 94 % O2 Device: None (Room air) Oxygen Delivery: 2 LPM     Pain: denies pain.   Neuro: A&Ox4. Tremors. Calls appropriately.   Cardiac:   No tele orders. Tachycardic. Heparin stopped at ~1130 this shift. HTN.   Respiratory: Lung sounds diminished, RA, Cpap at night. MYERS.   GI/Diet/Appetite: Regular diet w/ good appetite. BM 3/10.   :  Voiding w/o difficulty.   LDA's: PIV to MOHAN BALL.   Skin: RUE infiltrated 3/9, ecchymotic, edema, reports tenderness. Otherwise, skin is intact.   Activity: Up ad [place] in room.   Tests/Procedures:   Pertinent Labs/Lab Collection: Tacrolimus level critical value 22.1, Tacrolimus held. Sepsis triggered, lactic acid 1.6. Mg 1.4, 4gm given, recheck 2.7. INR 2.62.     Plan: patient will discharge home when medically stable.

## 2023-03-12 NOTE — PLAN OF CARE
D: PMH of bilateral lung transplant 2/2 NSIP. Transferred from OSH after hypoxic respiratory failure in the setting of persistent COVID positive testing and imaging concerning for PE while taking therapeutic coumadin.     A:   Neuro: A/Ox4. Calls appropriately. Cooperative with cares. Denies headache.  Cardiac/Tele: Elevated BP's overnight. Sinus tach with 's-130's while sitting up. 's with activity. Pt symptomatic after activity with increased dizziness, lightheadedness, and breathlessness. Pt also reports worsened tremors. 2L O2 applied as needed for breathlessness. Denies chest pain and palpitations. Team notified and assessed pt at bedside. CXR and EKG completed and labs drawn. Lactic acid 3.8. Team notified and aware. LR bolus given. BP WDL and HR 70's-90's after bolus and while asleep. Lactic recheck 1.3. Afebrile.  Respiratory: Sats >92% on RA while awake. Bi-pap used overnight. Significant MYERS.  GI/: Continent. Up to bathroom independently. Last BM 3/10 per pt.  Diet/Appetite: Regular diet. ACHS BG.  Skin: Old IV infiltration site to R arm with large bruise and tender to touch.   LDAs: L PIV- SL   Activity: Ind/SBA  Pain: Denies     P: Continue to monitor and notify [facility] with changes.

## 2023-03-12 NOTE — PROGRESS NOTES
[facility] [place]    Progress Note - Medicine Service, [facility] TEAM 1       Date of Admission:  3/6/2023    Assessment & Plan   Edson Thornton Jr. is a 57 year old male admitted on 3/6/2023. 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 is a 57 year old male admitted on 3/6/2023. 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 Patient has been improving on Remdesivr, has been breathing comfortably on room air with saturation >90%, regarding PE results are indeterminate, but not worried acutely about immediate recurrence or continued suspicion for current presentation being representative.    Today:  - Tacrolimus level supra therapeutic at 22 micrograms/L- Continued holding  - Continue incentive spirometry  - Repeat exercise ox test prior to discharge   - Monitor patient for symptom return or exacerbation with low threshold for further work up       #COVID Pneumonia  #Acute Hypoxic Respiratory Failure  #Possible Aspergillus Infection  #Rothia mucilaginosa Positive sputum culture   #Episode SOB, elevated AST, Cr and Lactic acid  Patient has been breathing comfortably on room air with O2 saturation >92%. Endorses shortness of breath on exertion with light crackles and wheezing present bilaterally. Patient denies any shortness of breath on exertion, has been afebrile. Per ID recommendation will continue remdesivir 100mg for 5 days as patient showed improvement initially, discontinue dexamethasone and stop antibiotics as this is most likely  viral with a low likely yang of bacterial or fungal infection. Per Transplant Pulmonary recommendations they don't believe this is a superimpose bacterial infection given patient has unremarkable procalcitonin and no evidence of other physical exam findings, recommended starting xopenex for wheezing. Patients sputum cultures came back positive for septate hyphae and growing yeast concerning for aspergillus infection. Patients elevated AST and Cr likely attributed to elevated tacrolimus level. Lactic acid not explained by medication but did correct on second draw, patient asymptomatic, will continue to monitor  - Transplant ID consulted, appreciate recs   - Transplant pulm following, appreciate recs   - Xopenex neb TID  - Hold myfortic (held since 3/5), continue to hold on discharge   - S/p IVIG 3/8  - Completed Remdesivir 100mg IV for 5 days (3/5-3/9)  - Sputum culture positive for septate hyphae   - Start voriconazole 400mg x2 doses then 250mg BID    - Will need weekly LFTs   - Voriconazole trough level on 3/14/23  - On discharge continue xopenex followed by hypertonic saline nebulizer BID for 2 weeks   - Will need CT chest in 2 weeks and transplant pulm follow up   -Augmentin 875 mg for 5 days for rothia   - Holding tacrolimus (level 22 ug/L), re-check on 3/13    #Apical Pulmonary Embolism  CTA obtained at outside hospital (3/2023) showed 2 small filling defects in the right apical pulmonary artery,suspicious for subtle acute pulmonary emboli. Patient was taking coumadin with INR in therapeutic window. Heme was consulted and is not convinced that this is an acute pulmonary embolism and recommended restarying PTA warfarin. Discussed with radiology, unable to determine definitely if artifact or PE given different CT machine. Will continue treating as true PE.   - continue on warfarin    - INR therapeutic, discontinued heparin gtt      #Lung Transplant (10/2021) 2/2 [facility]   -pulmonary transplant consulted   -will  "continue PTA tacrolimus and prednisone and hold PTA MMF  -PTA Prednisone 5 in AM and 2.5 in PM  -PTA Tacrolimus 2 mg in AM and 5 in PM- Currently Held (3/11)  -PTA Bactrim for PJP ppx      #Chest Pressure   Patient endorsed new onset of chest pain that started this AM (3/9), was not described as painful but noticeable pressure that is reporducible on palpation.Given patients recent concern for PE, but no other red flag signs we will monitor this for angina, effusion, with most likely cause being musculoskeletal related from deconditioning.  - Monitor with no immediate intervention indicated at this time     chronic conditions~      #HTN: Restarted PTA Norvasc 10mg   #Hypothyroidusm: PTA levo  #GERD: PTA Pantoprazole   #Insomnia: PTA Melatonin   #Steroid induced hyperglycemia: sliding scale insulin  #BPH: PTA Tamsulosin    #Mood Disorder: PTA Fluoxetine, PTA Mirtazapine, PTA Lamotrigine       Diet: Combination Diet Regular Diet Adult  DVT Prophylaxis: warfarin  Watson Catheter: Not present  Fluids: none  Cardiac Monitoring: None  Code Status: Full Code      Clinically Significant Risk Factors            # Hypomagnesemia: Lowest Mg = 1.4 mg/dL in last 2 days, will replace as needed   # Hypoalbuminemia: Lowest albumin = 3.4 g/dL at 3/11/2023 11:27 PM, will monitor as appropriate   # Thrombocytopenia: Lowest platelets = 108 in last 2 days, will monitor for bleeding         # Overweight: Estimated body mass index is 26.5 kg/m  as calculated from the following:    Height as of this encounter: 1.626 m (5' 4\").    Weight as of this encounter: 70 kg (154 lb 6.4 oz).          Disposition Plan           The patient's care was discussed with the Attending Physician, Dr. Lobito Cheatham, Chief Resident/Fellow and Patient.    Allen Pisano  Medical Student  Medicine Service, [facility] TEAM 1  [facility]  Securely message with [employer] (more info)  Text page via [facility] Paging/Directory " "  See signed in provider for up to date coverage information     Resident/Fellow Attestation   I, Lanie Abdalla MD, was present with the medical/PRINCESS student who participated in the service and in the documentation of the note.  I have verified the history and personally performed the physical exam and medical decision making.  I agree with the assessment and plan of care as documented in the note.      Lanie Abdalla MD  PGY1  Date of Service (when I saw the patient): 03/12/23        ______________________________________________________________________    Interval History    Overnight: Endorsed feeling SOB and dizziness while showering, used 2L of oxygen for comfort, received 500cc of LR, with chest X-ray, EKG, and labs drawn.    No major complaints.Overall feeling well. Continues to endorse some shortness of breath with exertion. Endorsed some \"gunky\" feeling in lungs when moving. Still endorsed chest pressure that is present on deep inspiration, that has been present since admission. Denies any aches, fever, or dizziness. Endorses improved appetite and energy level compared to yesterday 3/11.    Physical Exam   Vital Signs: Temp: 97.9  F (36.6  C) Temp src: Oral BP: (!) 159/92 Pulse: 75   Resp: 18 SpO2: 100 % O2 Device: None (Room air) Oxygen Delivery: 2 LPM  Weight: 154 lbs 6.4 oz  General: no acute distress, sitting comfortably on room air, talking comfortably with occasional wet cough   HEENT: NC, AT, EOMI  Cardiac: RRR, normal S1/S2, warm distal extremities   Lung: course crackles diffuse, predominantly in lower lobes, wheezing bilaterally but more pronounced on R. Anterior and posterior lobes  Abdomen:soft, nontender   Extremities: moving all extremities, no LE edema  Neuro: AAAX3, cranial nerves grossly intact       Medical Decision Making      Data     I have personally reviewed the following data over the past 24 hrs:    6.6  \   9.8 (L)   / 124 (L)     139 106 20.8 (H) /  130 (H)   4.6 22 1.31 (H) \ "       ALT: 60 (H) AST: 38 AP: 90 TBILI: 0.2   ALB: 3.4 (L) TOT PROTEIN: 6.3 (L) LIPASE: N/A       Trop: N/A BNP: 484       Procal: 0.10 (H) CRP: N/A Lactic Acid: 1.3       INR:  3.09 (H) PTT:  N/A   D-dimer:  N/A Fibrinogen:  N/A        EKG Impression:   Sinus rhythm   Nonspecific ST and T wave abnormality   Abnormal ECG   When compared with ECG of 07-DEC-2021 06:29,   Premature atrial complexes are no longer Present   Nonspecific T wave abnormality no longer evident in Inferior leads     Imaging results reviewed over the past 24 hrs:   Recent Results (from the past 24 hour(s))   XR Chest Port 1 View    Narrative    EXAM: Chest radiograph 3/11/2023 10:50 PM    HISTORY: Shortness of breath.     COMPARISON: 3/9/2023.     TECHNIQUE: Portable AP view of the chest.    FINDINGS: Rotated x-ray.    Postsurgical changes of bilateral lung transplant with intact clam  shell sternotomy wires. Midline trachea. Stable cardiac silhouette.  Trace bilateral presumed pleural thickening. No pneumothorax.  Unchanged perihilar and basilar patchy mixed airspace and interstitial  opacities, with increased opacities in the left upper lung zone. No  acute osseous abnormality. Chronic appearing distal right clavicle  deformity with widening of the acromioclavicular joint. Upper abdomen  and soft tissues unremarkable.      Impression    IMPRESSION: Increased patchy opacities in the left upper/mid zone  suggesting atelectasis and/or pulmonary edema versus infection.  Persistent bibasilar opacities.    I have personally reviewed the examination and initial interpretation  and I agree with the findings.    SHANDRA CEVALLOS MD         SYSTEM ID:  [id number]

## 2023-03-13 ENCOUNTER — APPOINTMENT (OUTPATIENT)
Dept: GENERAL RADIOLOGY | Facility: CLINIC | Age: 58
DRG: 177 | End: 2023-03-13
Attending: PHYSICIAN ASSISTANT
Payer: COMMERCIAL

## 2023-03-13 ENCOUNTER — APPOINTMENT (OUTPATIENT)
Dept: PHYSICAL THERAPY | Facility: CLINIC | Age: 58
DRG: 177 | End: 2023-03-13
Attending: STUDENT IN AN ORGANIZED HEALTH CARE EDUCATION/TRAINING PROGRAM
Payer: COMMERCIAL

## 2023-03-13 LAB
ANION GAP SERPL CALCULATED.3IONS-SCNC: 12 MMOL/L (ref 7–15)
ATRIAL RATE - MUSE: 119 BPM
BUN SERPL-MCNC: 19.3 MG/DL (ref 6–20)
CALCIUM SERPL-MCNC: 8.8 MG/DL (ref 8.6–10)
CHLORIDE SERPL-SCNC: 109 MMOL/L (ref 98–107)
CREAT SERPL-MCNC: 1.27 MG/DL (ref 0.67–1.17)
DEPRECATED HCO3 PLAS-SCNC: 21 MMOL/L (ref 22–29)
DIASTOLIC BLOOD PRESSURE - MUSE: NORMAL MMHG
GFR SERPL CREATININE-BSD FRML MDRD: 66 ML/MIN/1.73M2
GLUCOSE BLDC GLUCOMTR-MCNC: 140 MG/DL (ref 70–99)
GLUCOSE BLDC GLUCOMTR-MCNC: 154 MG/DL (ref 70–99)
GLUCOSE BLDC GLUCOMTR-MCNC: 207 MG/DL (ref 70–99)
GLUCOSE BLDC GLUCOMTR-MCNC: 211 MG/DL (ref 70–99)
GLUCOSE SERPL-MCNC: 159 MG/DL (ref 70–99)
INR PPP: 3.5 (ref 0.85–1.15)
INTERPRETATION ECG - MUSE: NORMAL
P AXIS - MUSE: 41 DEGREES
POTASSIUM SERPL-SCNC: 4.9 MMOL/L (ref 3.4–5.3)
PR INTERVAL - MUSE: 134 MS
QRS DURATION - MUSE: 74 MS
QT - MUSE: 306 MS
QTC - MUSE: 430 MS
R AXIS - MUSE: 38 DEGREES
SODIUM SERPL-SCNC: 142 MMOL/L (ref 136–145)
SYSTOLIC BLOOD PRESSURE - MUSE: NORMAL MMHG
T AXIS - MUSE: 61 DEGREES
TACROLIMUS BLD-MCNC: 17.5 UG/L (ref 5–15)
TME LAST DOSE: ABNORMAL H
TME LAST DOSE: ABNORMAL H
VENTRICULAR RATE- MUSE: 119 BPM

## 2023-03-13 PROCEDURE — 250N000012 HC RX MED GY IP 250 OP 636 PS 637: Performed by: STUDENT IN AN ORGANIZED HEALTH CARE EDUCATION/TRAINING PROGRAM

## 2023-03-13 PROCEDURE — 250N000013 HC RX MED GY IP 250 OP 250 PS 637

## 2023-03-13 PROCEDURE — 36415 COLL VENOUS BLD VENIPUNCTURE: CPT | Performed by: STUDENT IN AN ORGANIZED HEALTH CARE EDUCATION/TRAINING PROGRAM

## 2023-03-13 PROCEDURE — 97110 THERAPEUTIC EXERCISES: CPT | Mod: GP

## 2023-03-13 PROCEDURE — 85610 PROTHROMBIN TIME: CPT | Performed by: STUDENT IN AN ORGANIZED HEALTH CARE EDUCATION/TRAINING PROGRAM

## 2023-03-13 PROCEDURE — 999N000157 HC STATISTIC RCP TIME EA 10 MIN

## 2023-03-13 PROCEDURE — 94640 AIRWAY INHALATION TREATMENT: CPT

## 2023-03-13 PROCEDURE — 214N000001 HC R&B CCU UMMC

## 2023-03-13 PROCEDURE — 36415 COLL VENOUS BLD VENIPUNCTURE: CPT

## 2023-03-13 PROCEDURE — 80197 ASSAY OF TACROLIMUS: CPT | Performed by: PHYSICIAN ASSISTANT

## 2023-03-13 PROCEDURE — 250N000013 HC RX MED GY IP 250 OP 250 PS 637: Performed by: STUDENT IN AN ORGANIZED HEALTH CARE EDUCATION/TRAINING PROGRAM

## 2023-03-13 PROCEDURE — 71046 X-RAY EXAM CHEST 2 VIEWS: CPT | Mod: 26 | Performed by: RADIOLOGY

## 2023-03-13 PROCEDURE — 94668 MNPJ CHEST WALL SBSQ: CPT

## 2023-03-13 PROCEDURE — 250N000009 HC RX 250: Performed by: INTERNAL MEDICINE

## 2023-03-13 PROCEDURE — 71046 X-RAY EXAM CHEST 2 VIEWS: CPT

## 2023-03-13 PROCEDURE — 80285 DRUG ASSAY VORICONAZOLE: CPT | Performed by: STUDENT IN AN ORGANIZED HEALTH CARE EDUCATION/TRAINING PROGRAM

## 2023-03-13 PROCEDURE — 99233 SBSQ HOSP IP/OBS HIGH 50: CPT | Performed by: NURSE PRACTITIONER

## 2023-03-13 PROCEDURE — 99233 SBSQ HOSP IP/OBS HIGH 50: CPT | Mod: GC | Performed by: STUDENT IN AN ORGANIZED HEALTH CARE EDUCATION/TRAINING PROGRAM

## 2023-03-13 PROCEDURE — 94660 CPAP INITIATION&MGMT: CPT

## 2023-03-13 PROCEDURE — 250N000013 HC RX MED GY IP 250 OP 250 PS 637: Performed by: NURSE PRACTITIONER

## 2023-03-13 PROCEDURE — 80048 BASIC METABOLIC PNL TOTAL CA: CPT

## 2023-03-13 RX ORDER — LEVALBUTEROL TARTRATE 45 UG/1
2 AEROSOL, METERED ORAL 3 TIMES DAILY
Status: DISCONTINUED | OUTPATIENT
Start: 2023-03-13 | End: 2023-03-14 | Stop reason: HOSPADM

## 2023-03-13 RX ADMIN — INSULIN ASPART 1 UNITS: 100 INJECTION, SOLUTION INTRAVENOUS; SUBCUTANEOUS at 08:09

## 2023-03-13 RX ADMIN — LEVALBUTEROL HYDROCHLORIDE 1.25 MG: 1.25 SOLUTION RESPIRATORY (INHALATION) at 09:11

## 2023-03-13 RX ADMIN — LAMOTRIGINE 50 MG: 25 TABLET ORAL at 20:35

## 2023-03-13 RX ADMIN — FLUOXETINE 20 MG: 20 CAPSULE ORAL at 08:04

## 2023-03-13 RX ADMIN — PANTOPRAZOLE SODIUM 40 MG: 40 TABLET, DELAYED RELEASE ORAL at 08:04

## 2023-03-13 RX ADMIN — LEVALBUTEROL TARTRATE 2 PUFF: 45 AEROSOL, METERED ORAL at 16:13

## 2023-03-13 RX ADMIN — MIRTAZAPINE 7.5 MG: 7.5 TABLET, FILM COATED ORAL at 21:30

## 2023-03-13 RX ADMIN — AMOXICILLIN AND CLAVULANATE POTASSIUM 1 TABLET: 875; 125 TABLET ORAL at 08:04

## 2023-03-13 RX ADMIN — INSULIN ASPART 2 UNITS: 100 INJECTION, SOLUTION INTRAVENOUS; SUBCUTANEOUS at 12:32

## 2023-03-13 RX ADMIN — VORICONAZOLE 250 MG: 200 TABLET ORAL at 08:04

## 2023-03-13 RX ADMIN — TAMSULOSIN HYDROCHLORIDE 0.4 MG: 0.4 CAPSULE ORAL at 17:10

## 2023-03-13 RX ADMIN — AMLODIPINE BESYLATE 10 MG: 10 TABLET ORAL at 08:04

## 2023-03-13 RX ADMIN — PREDNISONE 2.5 MG: 2.5 TABLET ORAL at 17:10

## 2023-03-13 RX ADMIN — AMOXICILLIN AND CLAVULANATE POTASSIUM 1 TABLET: 875; 125 TABLET ORAL at 20:35

## 2023-03-13 RX ADMIN — SULFAMETHOXAZOLE AND TRIMETHOPRIM 1 TABLET: 400; 80 TABLET ORAL at 08:04

## 2023-03-13 RX ADMIN — PREDNISONE 5 MG: 5 TABLET ORAL at 08:04

## 2023-03-13 RX ADMIN — WARFARIN SODIUM 0.5 MG: 1 TABLET ORAL at 17:10

## 2023-03-13 RX ADMIN — LEVOTHYROXINE SODIUM 25 MCG: 0.03 TABLET ORAL at 08:04

## 2023-03-13 RX ADMIN — VORICONAZOLE 250 MG: 200 TABLET ORAL at 20:35

## 2023-03-13 ASSESSMENT — ACTIVITIES OF DAILY LIVING (ADL)
ADLS_ACUITY_SCORE: 28

## 2023-03-13 NOTE — PROGRESS NOTES
Pulmonary Medicine  Cystic Fibrosis - Lung Transplant Team  Progress Note  2023       Patient: Edson Thornton Jr.  MRN: [id number]  : 1965 (age 57 year old)  Transplant: 10/16/2021 (Lung), POD#513  Admission date: 3/6/2023    Assessment & Plan:     Edson Thornton Jr. is a 57 year old male with h/o BSLT for NSIP/ILD (10/16/2021), bronchiectasis, moderate PH, RA, SALTY, chronic HSV infection, hypogammaglobulinemia, steroid-induced diabetes, hypothyroidism, PFO, HTN, HLD, duodenal anomaly, anxiety, and depression.  Post-op course complicated by encephalopathy and diffuse weakness, acute to subacute CVA, afib with RVR, DVT, BRENNAN, GI bleed, and Candidemia/Candida empyema.  The patient was admitted from OSH on 3/6 for acute hypoxic respiratory failure in setting of COVID infection and PE. Also with secondary pneumonia and aspergillus on sputum cultures (started on voriconazole 3/9). Weaned to RA during the day 3/6 and overnight (with BiPAP adjustments) 3/8. Now symptomatic from supra therapeutic tacrolimus level in setting of starting an azole, and with KARI patchy opacity on CXR.  Discharge delayed to possibly Tuesday.     Recommendations:  - Repeat bacterial sputum culture (3/12) with GPC, follow  - Recommend extending Augmentin to 10 day course (3/10-3/19) instead of prior planned 5d course per Dr. Woods given imaging and increased symptoms  - Xopenex TID inhaler (adjusted neb->inhaler 3/13), continue upon discharge  - Aerobika and IS hourly while awake  - Exercise oximetry study ordered 3/13  - AC management per hematology and primary team  - Continue voriconazole (3/9) per transplant ID, will need 12 hr trough level and EKG in 7-10 days from start  - If acute increase in oxygenation or tachycardia without clear etiology would repeat CTA for definitive evaluation of PE presence  - Tacro repeat level 3/13 still supratherapeutic. Continue to hold tacro and repeat level 3/14, ordered  - Continue to hold  Myfortic in setting of COVID, will revisit resuming in ~2 weeks in outpatient  - Pulmonary follow up currently scheduled 3/28 with repeat CT, consideration for resumption of myfortic, and labs (IgG, DSA, CMV)     Acute hypoxic respiratory failure:  COVID infection:   Secondary pneumonia: Initial COVID infection on 2/16, some mild improvement, then regression s/p steroids and 2 weeks of levaquin without improvement. Presented to OSH on 3/5 with increased MYERS and hypoxia requiring 4L NC and COVID CT was 20.2 on 3/9. S/p another 5 days of remdesivir at [facility] finished on 3/9. Sputum growing aspergillus and rothia mucilaginosa and started on voriconazole and Augmentin per ID. CT chest with evidence of peripheral nodular opacities which are felt to represent COVID but may also be post COVID . Mainly on RA and BiPAP for sleep on RA (uses a BiPAP at home). Reported increased dyspnea and slightly more productive cough 3/12, so was placed on 2L NC and CXR 3/12 demonstrated small KARI opacity, Also with multiple symptoms associated with his tac level (tremors, dizziness, elevated BP and general feeling of being unwell).   - Repeat bacterial sputum culture (3/12) with GPC, follow  - Bacterial sputum culture (3/8) with Aspergillus fumigatus (as below) and rothia mucilaginosa  - Fungal sputum culture (3/8) with C. Albicans and Aspergillus  - Recommend extending Augmentin to 10 day course (3/10-3/19) instead of prior planned 5d course per Dr. Woods given imaging and increased symptoms  - Nebs: Xopenex TID inhaler (adjusted neb->inhaler 3/13), continue upon discharge;  HTS 3% discontinued 3/12 as not sure if helping and could be worsening pulmonary symptoms  - Aerobika and IS hourly while awake  - Additional steroids deferred given new Aspergillus  - Supplemental oxygen as needed to maintain SpO2 >92%; exercise oximetry study ordered 3/13  - CXR (3/13) with slightly improved left sided opacities, increased perihilar and right  sided patchy opacities  - Repeat CT chest without contrast in ~2 weeks (around time of pulmonary follow up on 3/28)  - AC management per hematology and primary team    Aspergillus infection: on sputum culture 3/8. BDG fungitell positive (3/8) positive at 95 and Aspergillus galactomannan (3/8) positive at 1.83.    - Voriconazole (3/9) per transplant ID, will need 12 hr trough level and EKG in 7-10 days from start     Concern for PE: Initial scan done at OSH with two small RUL filling defects, but in review with radiology (Dr. Lindsay) here, it is unclear that this is not artifact given type of scanner used. He has been therapeutic on warfarin prior to admission and this is likely not the cause of his hypoxia. LE dopplers negative.   - If acute increase in oxygenation or tachycardia without clear etiology would repeat CTA for definitive evaluation of PE presence     S/p bilateral sequential lung transplant (BSLT) for NSIP/ILD: Seen in pulmonary clinic 12/20/22 with intermittent dyspnea, PFTs with marked improvement and no acute changes on CXR.  EBV 3/7 negative.   - Pulmonary follow up currently scheduled 3/28     Immunosuppression:  - Tacrolimus HELD since 3/11 PM (prior 1.5 mg qAM / 1 mg qPM).  Goal level 8-10.  Repeat level 3/13 still supratherapeutic at 17.5. Continue to hold tacro and repeat level 3/14, ordered  - Myfortic held 3/5 in setting of COVID (prior dose 720 mg BID), revisit resuming in ~2 weeks in outpatient  - Prednisone 5 mg qAM / 2.5 mg qPM     Prophylaxis:   - Bactrim for PJP ppx  - CMV D-/R-, although with recent CMV <35 on 1/27 (previously not detected) which raises the question of seroconversion (will keep this in mind if started on increased steroids in future), repeat 3/7 negative. Repeat CMV 3/28     Positive crossmatch:   Positive DSA: Had a retrospective positive crossmatch following transplantation, attributed to receiving rituximab.  DSA initially positive 11/29/21.  Most recent DSA on 3/8  "remains positive (DQ:5) with mfi down to 551.  - Repeat DSA 3/28       Hypogammaglobulinemia: IgG adequate at 502 on 1/3.  Repeat IgG low at 168 on 3/7, s/p IVIG on 3/8.   - Repeat IgG 3/28       SALTY: Continue PTA NIPPV overnight (see RT note 3/9 for details).     We appreciate the excellent care provided by the [place] [id number] team.  Recommendations communicated via telephone and this note.  Will continue to follow along closely, please do not hesitate to call with any questions or concerns.    Patient discussed with Dr. Chuck Jeffrey, APRN, CNP   Inpatient Nurse Practitioner  Pulmonary CF/Transplant  Pager #[phone]       Subjective & Interval History:     Breathing feels more comfortable today. Denies SOB at rest, moderate MYERS improving.  Remains on RA and BiPAP (on RA) overnight. Cough still mildly productive cough, benefiting from Xopenex nebs. Some congestion and \"rattle\" but clears with coughing, does feel tight. Mild tremors, shakiness and dizziness, better today. No new GI complaints.     Review of Systems:     C: No fever, no chills, no recent weights, no change in good appetite  INTEGUMENTARY/SKIN: No rash or obvious new lesions  ENT/MOUTH: No sore throat, no sinus pain, no nasal congestion or drainage  RESP: See interval history  CV: No chest pain, no palpitations, no peripheral edema  GI: No nausea, no vomiting, no change in stools, no reflux symptoms  : No dysuria  MUSCULOSKELETAL: No myalgias, no arthralgias  ENDOCRINE: Blood sugars + intermittently elevated  NEURO: No headache, no numbness or tingling  PSYCHIATRIC: Mood stable    Physical Exam:     All notes, images, and labs from past 24 hours (at minimum) were reviewed.    Vital signs:  Temp: 98.6  F (37  C) Temp src: Oral BP: (!) 149/93 Pulse: 79   Resp: 15 SpO2: 100 % O2 Device: None (Room air) Oxygen Delivery: 2 LPM Height: 162.6 cm (5' 4\") Weight: 70 kg (154 lb 6.4 oz)  I/O:     Intake/Output Summary (Last 24 hours) at 3/13/2023 " 1247  Last data filed at 3/13/2023 0800  Gross per 24 hour   Intake --   Output 500 ml   Net -500 ml     Constitutional: sitting in bed, in no apparent distress.   HEENT: Eyes with pink conjunctivae, anicteric.  Oral mucosa moist without lesions.   PULM: Fair air flow bilaterally.  +bibasilar crackles, no rhonchi, no wheezes.  Non-labored breathing on RA.  CV: Normal S1 and S2.  RRR.  No murmur, gallop, or rub.  No peripheral edema.   ABD: NABS, soft, nontender, nondistended.    MSK: Moves all extremities.  No apparent muscle wasting.   NEURO: Alert and conversant.   SKIN: Warm, dry.  No rash on limited exam.   PSYCH: Mood stable.     Lines, Drains, and Devices:  Peripheral IV 03/10/23 Anterior;Left Lower forearm (Active)   Site Assessment WDL 03/13/23 0800   Line Status Saline locked 03/13/23 0800   Dressing Intervention New dressing  03/10/23 0236   Phlebitis Scale 0-->no symptoms 03/13/23 0800   Infiltration Scale 0 03/13/23 0800   Number of days: 3     Data:     LABS    CMP:   Recent Labs   Lab 03/13/23  1232 03/13/23  0809 03/13/23  0713 03/12/23  2257 03/12/23  0808 03/12/23  0544 03/11/23  2327 03/11/23  1410 03/11/23  1349 03/11/23  0905 03/11/23  0524 03/10/23  1212 03/10/23  0717 03/09/23  2126 03/09/23  2120 03/09/23  1643 03/09/23  1524 03/09/23  1414 03/09/23  0706 03/06/23  1845 03/06/23  1843   NA  --   --  142  --   --  139 140  --   --   --  141  --  140  --   --   --   --   --  139   < > 138   POTASSIUM  --   --  4.9  --   --  4.6 4.3  --   --   --  4.5  --  4.2  --   --   --   --   --  3.6   < > 4.9   CHLORIDE  --   --  109*  --   --  106 104  --   --   --  106  --  106  --   --   --   --   --  108*   < > 105   CO2  --   --  21*  --   --  22 19*  --   --   --  24  --  23  --   --   --   --   --  19*   < > 21*   ANIONGAP  --   --  12  --   --  11 17*  --   --   --  11  --  11  --   --   --   --   --  12   < > 12   * 154* 159* 254*   < > 159* 263*   < >  --    < > 185*   < > 129*   < >  --     < >  --    < > 191*   < > 160*   BUN  --   --  19.3  --   --  20.8* 21.7*  --   --   --  19.8  --  16.2  --   --   --   --   --  16.5   < > 18.6   CR  --   --  1.27*  --   --  1.31* 1.34*  --   --   --  1.14  --  0.95  --   --   --   --   --  0.93   < > 1.10   GFRESTIMATED  --   --  66  --   --  63 62  --   --   --  75  --  >90  --   --   --   --   --  >90   < > 78   ERIK  --   --  8.8  --   --  8.8 9.3  --   --   --  8.9  --  8.0*  --   --   --   --   --  8.7   < > 8.7   MAG  --   --   --   --   --   --   --   --  2.7*  --  1.4*  --  2.1  --   --   --  2.3  --  1.1*   < >  --    PHOS  --   --   --   --   --   --   --   --   --   --  4.3  --  3.8  --  2.4*  --   --   --  1.7*  --   --    PROTTOTAL  --   --   --   --   --   --  6.3*  --   --   --   --   --   --   --   --   --   --   --   --   --  5.4*   ALBUMIN  --   --   --   --   --   --  3.4*  --   --   --   --   --   --   --   --   --   --   --   --   --  3.4*   BILITOTAL  --   --   --   --   --   --  0.2  --   --   --   --   --   --   --   --   --   --   --   --   --  0.2   ALKPHOS  --   --   --   --   --   --  90  --   --   --   --   --   --   --   --   --   --   --   --   --  71   AST  --   --   --   --   --   --  38  --   --   --   --   --   --   --   --   --   --   --   --   --  32   ALT  --   --   --   --   --   --  60*  --   --   --   --   --   --   --   --   --   --   --   --   --  23    < > = values in this interval not displayed.     CBC:   Recent Labs   Lab 03/12/23  1320 03/12/23  0544 03/11/23  2327 03/11/23  1349   WBC 6.5 6.6 8.2 6.5   RBC 5.03 3.98* 4.45 4.50   HGB 12.5* 9.8* 11.1* 11.0*   HCT 43.6 32.1* 35.8* 36.6*   MCV 87 81 80 81   MCH 24.9* 24.6* 24.9* 24.4*   MCHC 28.7* 30.5* 31.0* 30.1*   RDW 16.9* 16.4* 16.5* 16.3*   * 124* 140* 108*       INR:   Recent Labs   Lab 03/13/23  0713 03/12/23  0544 03/11/23  0955 03/10/23  0717   INR 3.50* 3.09* 2.62* 2.23*       Glucose:   Recent Labs   Lab 03/13/23  1232 03/13/23  0809 03/13/23  0713  03/12/23  2257 03/12/23  1800 03/12/23  1434   * 154* 159* 254* 167* 160*       Blood Gas:   Recent Labs   Lab 03/11/23  2327   PHV 7.44*   PCO2V 34*   PO2V 39   HCO3V 23   LORI -0.3   O2PER 21       Culture Data No results for input(s): CULT in the last 168 hours.    Virology Data:   Lab Results   Component Value Date    FLUAH1 Not Detected 06/21/2022    FLUAH3 Not Detected 06/21/2022    LN4518 Not Detected 06/21/2022    IFLUB Not Detected 06/21/2022    RSVA Not Detected 06/21/2022    RSVB Not Detected 06/21/2022    PIV1 Not Detected 06/21/2022    PIV2 Not Detected 06/21/2022    PIV3 Not Detected 06/21/2022    HMPV Not Detected 06/21/2022    HRVS Negative 04/19/2022    ADVBE Negative 04/19/2022    ADVC Negative 04/19/2022    ADVC Negative 11/22/2021    ADVC Negative 11/12/2021       Historical CMV results (last 3 of prior testing):  Lab Results   Component Value Date    CMVQNT Not Detected 03/07/2023    CMVQNT Not Detected 11/14/2022    CMVQNT Not Detected 09/12/2022     No results found for: CMVLOG    Urine Studies    Recent Labs   Lab Test 03/12/23  0307 11/01/21  1336   URINEPH 5.5 5.5   NITRITE Negative Negative   LEUKEST Negative Negative   WBCU 2 0       Most Recent [place] Pulmonary Function Testing (FVC/FEV1 only)  FVC-Pre   Date Value Ref Range Status   12/20/2022 2.46 L    11/14/2022 2.12 L    09/12/2022 2.27 L    06/20/2022 2.14 L      FVC-%Pred-Pre   Date Value Ref Range Status   12/20/2022 68 %    11/14/2022 54 %    09/12/2022 57 %    06/20/2022 54 %      FEV1-Pre   Date Value Ref Range Status   12/20/2022 1.61 L    11/14/2022 1.22 L    09/12/2022 1.42 L    06/20/2022 1.29 L      FEV1-%Pred-Pre   Date Value Ref Range Status   12/20/2022 56 %    11/14/2022 39 %    09/12/2022 45 %    06/20/2022 41 %        IMAGING    Recent Results (from the past 48 hour(s))   XR Chest Port 1 View    Narrative    EXAM: Chest radiograph 3/11/2023 10:50 PM    HISTORY: Shortness of breath.     COMPARISON: 3/9/2023.      TECHNIQUE: Portable AP view of the chest.    FINDINGS: Rotated x-ray.    Postsurgical changes of bilateral lung transplant with intact clam  shell sternotomy wires. Midline trachea. Stable cardiac silhouette.  Trace bilateral presumed pleural thickening. No pneumothorax.  Unchanged perihilar and basilar patchy mixed airspace and interstitial  opacities, with increased opacities in the left upper lung zone. No  acute osseous abnormality. Chronic appearing distal right clavicle  deformity with widening of the acromioclavicular joint. Upper abdomen  and soft tissues unremarkable.      Impression    IMPRESSION: Increased patchy opacities in the left upper/mid zone  suggesting atelectasis and/or pulmonary edema versus infection.  Persistent bibasilar opacities.    I have personally reviewed the examination and initial interpretation  and I agree with the findings.    SHANDRA CEVALLOS MD         SYSTEM ID:  [id number]   XR Chest 2 Views    Narrative    Exam: XR CHEST 2 VIEWS, 3/13/2023 10:51 AM    Comparison: 3/11/2023    History: Added with covid, h/o left upper opacity. History of  bilateral lung transplant.    Findings:  Stable postoperative changes of the chest and upper abdomen. Clamshell  sternotomy wires intact.    Cardiac silhouette is partially obscured. No pneumothorax. Bilateral  blunting of the costophrenic angles. Increased perihilar and bibasilar  patchy opacities. Chronic right distal clavicle deformity. No  acute-appearing osseous lesions.      Impression    Impression:   1. Increased perihilar and bibasilar patchy opacities, representing  infection versus atelectasis/edema.  2. Blunting of the costophrenic angles bilaterally, representing  effusion versus scarring.    I have personally reviewed the examination and initial interpretation  and I agree with the findings.    JODI MENDEZ MD         SYSTEM ID:  [id number]

## 2023-03-13 NOTE — PLAN OF CARE
D: PMH of bilateral lung transplant 2/2 NSIP. Transferred from OSH after hypoxic respiratory failure in the setting of persistent COVID positive testing and imaging concerning for PE while taking therapeutic coumadin.     A:   Neuro: A/Ox4. Tremors. Calls appropriately. Cooperative with cares. Denies headache.   Cardiac/Tele: Elevated BP's. Sinus tach with 's-140's while awake. SR with HR 60's-80's while asleep. Pt symptomatic after activity with lightheadedness and breathlessness. Denies chest pain and palpitations.   Respiratory: Sats >92% on RA. Bi-pap used overnight. Significant MYERS. Frequent productive cough. Expiratory wheeze throughout lungs.  GI/: Continent. Up to bathroom independently. Last BM 3/11 per pt.  Diet/Appetite: Regular diet. ACHS BG.  Skin: Old IV infiltration site to R arm with large bruise and improving tenderness.   LDAs: L PIV- SL   Activity: Ind/SBA  Pain: Denies     P: Continue to monitor and notify [facility] with changes.    Shift: [id number]

## 2023-03-13 NOTE — PLAN OF CARE
Physical Therapy Discharge Summary    Reason for therapy discharge:    All goals and outcomes met, no further needs identified.    Progress towards therapy goal(s). See goals on Care Plan in [facility] electronic health record for goal details.  Goals met    Therapy recommendation(s):      Continued therapy is recommended.  Rationale/Recommendations:  OP PT recommended for further progression of activity tolerance and strengthening.

## 2023-03-13 NOTE — PROGRESS NOTES
Pt's sats were >90% on RA when ambulating in room and did not desat less than 90% for at least 3 minutes. However, dyspnea was noted with activity.

## 2023-03-13 NOTE — PROGRESS NOTES
[facility]    Progress Note - Medicine Service, MAROON TEAM 1       Date of Admission:  3/6/2023    Assessment & Plan   Edson Thornton Jr. is a 57 year old male admitted on 3/6/2023. He has a history of bilateral lung transplant 2/2 NSIP, afib (11/2021),  Who was transferred from [place] after hypoxic respiratory failure in the setting of persistent COVID positive testing and imaging concerning for PE while taking therapeutic coumadinEdson Thornton Jr. is a 57 year old male admitted on 3/6/2023. He has a history of bilateral lung transplant 2/2 NSIP, afib (11/2021),  Who was transferred from [place] after hypoxic respiratory failure in the setting of persistent COVID positive testing and imaging concerning for PE while taking therapeutic coumadin. Patient has been improving on Remdesivr, has been breathing comfortably on room air with saturation >90%, regarding PE results are indeterminate, but not worried acutely about immediate recurrence or continued suspicion for current presentation being representative.    Today:  - Tacrolimus level supra therapeutic at 17.5 micrograms/L- Continued holding  - Repeat exercise ox test   - Extend Augmentin course to 10 days  - Likely discharge tomorrow      #COVID Pneumonia  #Acute Hypoxic Respiratory Failure  #Possible Aspergillus Infection  #Rothia mucilaginosa Positive sputum culture   #Episode SOB, elevated AST, Cr and Lactic acid  Patient has been breathing comfortably on room air with O2 saturation >92%. Endorses shortness of breath on exertion with light crackles and wheezing present bilaterally. Patient denies any shortness of breath on exertion, has been afebrile. Per ID recommendation will continue remdesivir 100mg for 5 days as patient showed improvement initially, discontinue dexamethasone and stop antibiotics as this is most likely viral with a low likely yang of bacterial or fungal infection. Per Transplant  Pulmonary recommendations they don't believe this is a superimpose bacterial infection given patient has unremarkable procalcitonin and no evidence of other physical exam findings, recommended starting xopenex for wheezing. Patients sputum cultures came back positive for septate hyphae and growing yeast concerning for aspergillus infection. Patients elevated AST and Cr likely attributed to elevated tacrolimus level. Lactic acid not explained by medication but did correct on second draw, patient asymptomatic, will continue to monitor  - Transplant ID consulted, appreciate recs   - Transplant pulm following, appreciate recs   - Discontinue Xopenex neb TID, switch to albuterol on discharge  - Hold myfortic (held since 3/5), continue to hold on discharge   - S/p IVIG 3/8  - Completed Remdesivir 100mg IV for 5 days (3/5-3/9)  - Sputum culture positive for septate hyphae   - Start voriconazole 400mg x2 doses then 250mg BID    - Will need weekly LFTs   - Voriconazole trough level after 3/16/23  - Will need CT chest in 2 weeks and transplant pulm follow up   - Augmentin 875 mg for 10 days (previously 5 days) for rothia and gram positive cocci on sputum culture   - Holding tacrolimus (level 17.5 ug/L), re-check on 3/14    #Apical Pulmonary Embolism  CTA obtained at outside hospital (3/2023) showed 2 small filling defects in the right apical pulmonary artery,suspicious for subtle acute pulmonary emboli. Patient was taking coumadin with INR in therapeutic window. Heme was consulted and is not convinced that this is an acute pulmonary embolism and recommended restarying PTA warfarin. Discussed with radiology, unable to determine definitely if artifact or PE given different CT machine. Will continue treating as true PE.   - continue on warfarin    - INR therapeutic     #Lung Transplant (10/2021) 2/2 [facility]   -pulmonary transplant consulted   -will continue PTA tacrolimus and prednisone and hold PTA MMF  -PTA Prednisone 5 in AM and  "2.5 in PM  -PTA Tacrolimus 2 mg in AM and 5 in PM- Currently Held (3/11)  -PTA Bactrim for PJP ppx      #Chest Pressure-improved    Patient endorsed new onset of chest pain that started this AM (3/9), was not described as painful but noticeable pressure that is reporducible on palpation.Given patients recent concern for PE, but no other red flag signs we will monitor this for angina, effusion, with most likely cause being musculoskeletal related from deconditioning.  - Monitor with no immediate intervention indicated at this time     chronic conditions~      #HTN: Restarted PTA Norvasc 10mg   #Hypothyroidusm: PTA levo  #GERD: PTA Pantoprazole   #Insomnia: PTA Melatonin   #Steroid induced hyperglycemia: sliding scale insulin  #BPH: PTA Tamsulosin    #Mood Disorder: PTA Fluoxetine, PTA Mirtazapine, PTA Lamotrigine       Diet: Combination Diet Regular Diet Adult  DVT Prophylaxis: warfarin  Watson Catheter: Not present  Fluids: none  Cardiac Monitoring: None  Code Status: Full Code      Clinically Significant Risk Factors              # Hypoalbuminemia: Lowest albumin = 3.4 g/dL at 3/11/2023 11:27 PM, will monitor as appropriate   # Thrombocytopenia: Lowest platelets = 105 in last 2 days, will monitor for bleeding         # Overweight: Estimated body mass index is 26.5 kg/m  as calculated from the following:    Height as of this encounter: 1.626 m (5' 4\").    Weight as of this encounter: 70 kg (154 lb 6.4 oz).          Disposition Plan           The patient's care was discussed with the Attending Physician, Dr. Lobito Cheatham, Chief Resident/Fellow and Patient.    Allen Pisano  Medical Student  Medicine Service, [facility] TEAM [address] [place] [address]  Securely message with [employer] (more info)  Text page via [facility] Paging/Directory   See signed in provider for up to date coverage information     Resident/Fellow Attestation   I, Lanie Abdalla MD, was present with the medical/PRINCESS student " "who participated in the service and in the documentation of the note.  I have verified the history and personally performed the physical exam and medical decision making.  I agree with the assessment and plan of care as documented in the note.        Lanie Abdalla MD  PGY1  Date of Service (when I saw the patient): 03/13/23    ____________________________________________________________________    Interval History    NAEON    No major complaints.Overall feeling well. Continues to endorse some shortness of breath with exertion, but reports improvement. States that the chest pressure has been improving and doesn't feel like \"a band across\" his chest. He still endorses chest pain on deep inspiration but is a 3 out of 10 compared to 6 out of 10 on admission. Endorses occasional cough but says it has improved, and denies any dizziness, nausea, vomiting or SOB while resting.    Physical Exam   Vital Signs: Temp: 98.6  F (37  C) Temp src: Oral BP: (!) 149/93 Pulse: 79   Resp: 15 SpO2: 100 % O2 Device: None (Room air)    Weight: 154 lbs 6.4 oz  General: no acute distress, sitting comfortably on room air, talking comfortably with occasional wet cough   HEENT: NC, AT, EOMI  Cardiac: RRR, normal S1/S2, warm distal extremities   Lung: light crackles on bilateral lobes with occasional wheezing audible over posterior lung fields  Abdomen:soft, nontender   Extremities: moving all extremities, no LE edema  Neuro: AAAX3, cranial nerves grossly intact     Medical Decision Making        Data     I have personally reviewed the following data over the past 24 hrs:    6.5  \   12.5 (L)   / 105 (L)     142 109 (H) 19.3 /  211 (H)   4.9 21 (L) 1.27 (H) \       INR:  3.50 (H) PTT:  N/A   D-dimer:  N/A Fibrinogen:  N/A       Imaging results reviewed over the past 24 hrs:   Recent Results (from the past 24 hour(s))   XR Chest 2 Views    Narrative    Exam: XR CHEST 2 VIEWS, 3/13/2023 10:51 AM    Comparison: 3/11/2023    History: Added with " covid, h/o left upper opacity. History of  bilateral lung transplant.    Findings:  Stable postoperative changes of the chest and upper abdomen. Clamshell  sternotomy wires intact.    Cardiac silhouette is partially obscured. No pneumothorax. Bilateral  blunting of the costophrenic angles. Increased perihilar and bibasilar  patchy opacities. Chronic right distal clavicle deformity. No  acute-appearing osseous lesions.      Impression    Impression:   1. Increased perihilar and bibasilar patchy opacities, representing  infection versus atelectasis/edema.  2. Blunting of the costophrenic angles bilaterally, representing  effusion versus scarring.    I have personally reviewed the examination and initial interpretation  and I agree with the findings.    JODI MENDEZ MD         SYSTEM ID:  [id number]

## 2023-03-13 NOTE — PLAN OF CARE
"BP (!) 141/81 (BP Location: Right arm)   Pulse 113   Temp 98.9  F (37.2  C) (Oral)   Resp 18   Ht 1.626 m (5' 4\")   Wt 70 kg (154 lb 6.4 oz)   SpO2 94%   BMI 26.50 kg/m      Shift: 0477-5015  Reason for Admission: hypoxic respiratory failure in the setting of persistent COVID positive testing   VS/Tele: VSS on RA. ST  Neuros: A/Ox4, able to make needs known.   Respiratory: Expiratory wheezing, MYERS. Walk test done today- no need for home O2  GI/: No BMs reported this shift, voiding adequately    Nutrition: Tolerating regular diet  Drains/Lines: L. PIV SL  Activity: Pt up ad [place] in room  Pain/Nausea: Denies both  Skin: Intact  Labs: BG ACHS  Plan of care: Plan for discharge tomorrow, pt states his ride will arrive at noon. Will continue to monitor and follow POC.     "

## 2023-03-14 ENCOUNTER — MEDICAL CORRESPONDENCE (OUTPATIENT)
Dept: HEALTH INFORMATION MANAGEMENT | Facility: CLINIC | Age: 58
End: 2023-03-14
Payer: COMMERCIAL

## 2023-03-14 ENCOUNTER — TELEPHONE (OUTPATIENT)
Dept: ANTICOAGULATION | Facility: CLINIC | Age: 58
End: 2023-03-14
Payer: COMMERCIAL

## 2023-03-14 VITALS
BODY MASS INDEX: 26.19 KG/M2 | RESPIRATION RATE: 18 BRPM | TEMPERATURE: 98.4 F | OXYGEN SATURATION: 99 % | HEART RATE: 110 BPM | WEIGHT: 153.4 LBS | HEIGHT: 64 IN | DIASTOLIC BLOOD PRESSURE: 90 MMHG | SYSTOLIC BLOOD PRESSURE: 144 MMHG

## 2023-03-14 DIAGNOSIS — I48.91 ATRIAL FIBRILLATION, UNSPECIFIED TYPE (H): Primary | ICD-10-CM

## 2023-03-14 DIAGNOSIS — Z79.899 ENCOUNTER FOR LONG-TERM (CURRENT) USE OF HIGH-RISK MEDICATION: ICD-10-CM

## 2023-03-14 LAB
ANION GAP SERPL CALCULATED.3IONS-SCNC: 13 MMOL/L (ref 7–15)
BACTERIA SPT CULT: ABNORMAL
BACTERIA SPT CULT: ABNORMAL
BUN SERPL-MCNC: 19.2 MG/DL (ref 6–20)
CALCIUM SERPL-MCNC: 9.3 MG/DL (ref 8.6–10)
CHLORIDE SERPL-SCNC: 106 MMOL/L (ref 98–107)
CREAT SERPL-MCNC: 1.31 MG/DL (ref 0.67–1.17)
DEPRECATED HCO3 PLAS-SCNC: 21 MMOL/L (ref 22–29)
GFR SERPL CREATININE-BSD FRML MDRD: 63 ML/MIN/1.73M2
GLUCOSE BLDC GLUCOMTR-MCNC: 107 MG/DL (ref 70–99)
GLUCOSE BLDC GLUCOMTR-MCNC: 131 MG/DL (ref 70–99)
GLUCOSE BLDC GLUCOMTR-MCNC: 151 MG/DL (ref 70–99)
GLUCOSE SERPL-MCNC: 94 MG/DL (ref 70–99)
INR PPP: 3.38 (ref 0.85–1.15)
POTASSIUM SERPL-SCNC: 4.7 MMOL/L (ref 3.4–5.3)
SODIUM SERPL-SCNC: 140 MMOL/L (ref 136–145)
TACROLIMUS BLD-MCNC: 13.4 UG/L (ref 5–15)
TME LAST DOSE: NORMAL H
TME LAST DOSE: NORMAL H
VORICONAZOLE SERPL-MCNC: 5.2 UG/ML (ref 1–5.5)

## 2023-03-14 PROCEDURE — 85610 PROTHROMBIN TIME: CPT | Performed by: STUDENT IN AN ORGANIZED HEALTH CARE EDUCATION/TRAINING PROGRAM

## 2023-03-14 PROCEDURE — 94660 CPAP INITIATION&MGMT: CPT

## 2023-03-14 PROCEDURE — 250N000013 HC RX MED GY IP 250 OP 250 PS 637

## 2023-03-14 PROCEDURE — 99232 SBSQ HOSP IP/OBS MODERATE 35: CPT | Performed by: INTERNAL MEDICINE

## 2023-03-14 PROCEDURE — 250N000013 HC RX MED GY IP 250 OP 250 PS 637: Performed by: STUDENT IN AN ORGANIZED HEALTH CARE EDUCATION/TRAINING PROGRAM

## 2023-03-14 PROCEDURE — 80197 ASSAY OF TACROLIMUS: CPT | Performed by: NURSE PRACTITIONER

## 2023-03-14 PROCEDURE — 250N000012 HC RX MED GY IP 250 OP 636 PS 637: Performed by: STUDENT IN AN ORGANIZED HEALTH CARE EDUCATION/TRAINING PROGRAM

## 2023-03-14 PROCEDURE — 80048 BASIC METABOLIC PNL TOTAL CA: CPT

## 2023-03-14 PROCEDURE — 99238 HOSP IP/OBS DSCHRG MGMT 30/<: CPT | Performed by: STUDENT IN AN ORGANIZED HEALTH CARE EDUCATION/TRAINING PROGRAM

## 2023-03-14 PROCEDURE — 36415 COLL VENOUS BLD VENIPUNCTURE: CPT | Performed by: NURSE PRACTITIONER

## 2023-03-14 PROCEDURE — 999N000157 HC STATISTIC RCP TIME EA 10 MIN

## 2023-03-14 RX ORDER — TACROLIMUS 0.5 MG/1
0.5 CAPSULE ORAL 2 TIMES DAILY
Qty: 60 CAPSULE | Refills: 0 | Status: SHIPPED | OUTPATIENT
Start: 2023-03-14 | End: 2023-03-24 | Stop reason: DRUGHIGH

## 2023-03-14 RX ORDER — WARFARIN SODIUM 1 MG/1
0.5 TABLET ORAL DAILY
Qty: 90 TABLET | Refills: 1 | Status: SHIPPED | OUTPATIENT
Start: 2023-03-14 | End: 2023-05-02

## 2023-03-14 RX ORDER — VORICONAZOLE 50 MG/1
250 TABLET, FILM COATED ORAL EVERY 12 HOURS
Qty: 790 TABLET | Refills: 0 | Status: SHIPPED | OUTPATIENT
Start: 2023-03-14 | End: 2023-03-15

## 2023-03-14 RX ORDER — LEVALBUTEROL TARTRATE 45 UG/1
2 AEROSOL, METERED ORAL 3 TIMES DAILY
Qty: 3 G | Refills: 3 | Status: SHIPPED | OUTPATIENT
Start: 2023-03-14 | End: 2023-05-12

## 2023-03-14 RX ADMIN — VORICONAZOLE 250 MG: 200 TABLET ORAL at 07:52

## 2023-03-14 RX ADMIN — AMOXICILLIN AND CLAVULANATE POTASSIUM 1 TABLET: 875; 125 TABLET ORAL at 07:52

## 2023-03-14 RX ADMIN — AMLODIPINE BESYLATE 10 MG: 10 TABLET ORAL at 07:52

## 2023-03-14 RX ADMIN — WARFARIN SODIUM 0.5 MG: 1 TABLET ORAL at 13:15

## 2023-03-14 RX ADMIN — PANTOPRAZOLE SODIUM 40 MG: 40 TABLET, DELAYED RELEASE ORAL at 07:52

## 2023-03-14 RX ADMIN — FLUOXETINE 20 MG: 20 CAPSULE ORAL at 07:52

## 2023-03-14 RX ADMIN — LEVOTHYROXINE SODIUM 25 MCG: 0.03 TABLET ORAL at 07:52

## 2023-03-14 RX ADMIN — SULFAMETHOXAZOLE AND TRIMETHOPRIM 1 TABLET: 400; 80 TABLET ORAL at 07:51

## 2023-03-14 RX ADMIN — PREDNISONE 5 MG: 5 TABLET ORAL at 07:52

## 2023-03-14 ASSESSMENT — ACTIVITIES OF DAILY LIVING (ADL)
ADLS_ACUITY_SCORE: 28

## 2023-03-14 NOTE — PLAN OF CARE
Transferred from [place] 3/6 after hypoxic respiratory failure in the setting of persistent COVID positive testing.     Code status: FULL     Team: linda 1      Neuro: A&Ox4, calls appropriately  Cardiac/Tele:  SR/ST. SBP elevated in 140s, no PRNs.   Respiratory: RA, wears CPAP at night  GI/: using urinal, LBM 3/13  Diet/Appetite: reg  Endocrine: ACHS  LDAs: L PIV-SL  Activity: ind during day, SBA at night  Pain: denies  Replacements: K, mag, phos protocols ordered.     Plan: Likely discharge today, has tentative ride at noon. Continue POC and update team with changes.     Time cared for [id number]

## 2023-03-14 NOTE — PLAN OF CARE
"  Problem: Plan of Care - These are the overarching goals to be used throughout the patient stay.    Goal: Plan of Care Review  Description: The Plan of Care Review/Shift note should be completed every shift.  The Outcome Evaluation is a brief statement about your assessment that the patient is improving, declining, or no change.  This information will be displayed automatically on your shift note.  Outcome: Adequate for Care Transition  Goal: Patient-Specific Goal (Individualized)  Description: You can add care plan individualizations to a care plan. Examples of Individualization might be:  \"Parent requests to be called daily at 9am for status\", \"I have a hard time hearing out of my right ear\", or \"Do not touch me to wake me up as it startles me\".  Outcome: Adequate for Care Transition  Goal: Absence of Hospital-Acquired Illness or Injury  Outcome: Adequate for Care Transition  Intervention: Identify and Manage Fall Risk  Recent Flowsheet Documentation  Taken 3/14/2023 1100 by Leticia Lindsay, RN  Safety Promotion/Fall Prevention:   assistive device/personal items within reach   clutter free environment maintained   lighting adjusted   nonskid shoes/slippers when out of bed   patient and family education   room near nurse's station   safety round/check completed  Taken 3/14/2023 0800 by Leticia Lindsay, RN  Safety Promotion/Fall Prevention:   assistive device/personal items within reach   clutter free environment maintained   lighting adjusted   nonskid shoes/slippers when out of bed   patient and family education   room near nurse's station   safety round/check completed  Intervention: Prevent Skin Injury  Recent Flowsheet Documentation  Taken 3/14/2023 0800 by Leticia Lindsay, RN  Body Position: position changed independently  Goal: Optimal Comfort and Wellbeing  Outcome: Adequate for Care Transition  Goal: Readiness for Transition of Care  Outcome: Adequate for Care Transition   Goal Outcome " Evaluation:

## 2023-03-14 NOTE — TELEPHONE ENCOUNTER
ANTICOAGULATION  MANAGEMENT: Discharge Review    Edson Thornton Jr. chart reviewed for anticoagulation continuity of care    Hospital Admission on 3/6-3/14 for Covid pneumonia.    Discharge disposition: Home    Results:    Recent labs: (last 7 days)     03/08/23  0624 03/08/23  1017 03/08/23  1854 03/09/23  0231 03/09/23  0706 03/09/23  1524 03/09/23  2331 03/10/23  0717 03/10/23  1714 03/11/23  0524 03/11/23  0955 03/12/23  0544 03/13/23  0713 03/14/23  0626   INR  --  2.09*  --   --  1.79*  --   --  2.23*  --   --  2.62* 3.09* 3.50* 3.38*   AAUFH >1.10*  --  0.73 0.81 0.76 0.59 0.72 0.70 0.67 0.81  --   --   --   --      Anticoagulation inpatient management:     See calendar    Anticoagulation discharge instructions:     Warfarin dosing: decrease dose to 0.5mg daily with an INR check in 2 days   Bridging: No   INR goal change: No     Medication changes affecting anticoagulation: Yes: Patient is on Voriconazole X 12 weeks.  Patient is on Augmentin X 1 week    Additional factors affecting anticoagulation: Yes: Patient is recovering from Covid     PLAN     Agree with discharge plan for follow up on 3/16/23    Patient not contacted    Anticoagulation Calendar updated    Terrie Cedillo RN

## 2023-03-14 NOTE — PROGRESS NOTES
Discharge at 1300  Discharged to: [place] to [place]  Via: Car with family  Accompanied by: Family  Belongings: Sent with  Teaching: Done  Clinic Appointment: March 28  Tele/IV: Discontinued

## 2023-03-14 NOTE — PROGRESS NOTES
Lung Transplant Consult Follow Up Note   March 14, 2023            Assessment and Plan:   Edson Thornton Jr. is a 57 year old male with h/o BSLT for NSIP/ILD (10/16/2021), bronchiectasis, moderate PH, RA, SALTY, chronic HSV infection, hypogammaglobulinemia, steroid-induced diabetes, hypothyroidism, PFO, HTN, HLD, duodenal anomaly, anxiety, and depression.  Post-op course complicated by encephalopathy and diffuse weakness, acute to subacute CVA, afib with RVR, DVT, BRENNAN, GI bleed, and Candidemia/Candida empyema.  The patient was admitted from OSH on 3/6 for acute hypoxic respiratory failure in setting of COVID infection and PE. Also with secondary pneumonia and aspergillus on sputum cultures (started on voriconazole 3/9). Weaned to RA during the day 3/6 and overnight (with BiPAP adjustments) 3/8. Continued clinical improvement. Stable for discharge.       Recommendations:  - Continue Augmentin through 3/19.  - Xopenex TID inhaler (adjusted neb->inhaler 3/13), continue upon discharge  - Aerobika and IS hourly while awake  - AC management per hematology and primary team  - Continue voriconazole (3/9) per transplant ID, will need 12 hr trough level and EKG in 7-10 days from start  - Restart tacro at 0.5mg BID and recheck a level on Friday. Please provide 0.5mg tabs on discharge.  - Continue to hold Myfortic in setting of COVID, will revisit resuming in ~2 weeks in outpatient  - Pulmonary follow up currently scheduled 3/28 with repeat CT, consideration for resumption of myfortic, and labs (IgG, DSA, CMV)     Acute hypoxic respiratory failure:  COVID infection:   Secondary pneumonia: Initial COVID infection on 2/16, some mild improvement, then regression s/p steroids and 2 weeks of levaquin without improvement. Presented to OSH on 3/5 with increased MYERS and hypoxia requiring 4L NC and COVID CT was 20.2 on 3/9. S/p another 5 days of remdesivir at [facility] finished on 3/9. Sputum growing aspergillus and rothia mucilaginosa and  started on voriconazole and Augmentin per ID. CT chest with evidence of peripheral nodular opacities which are felt to represent COVID but may also be post COVID . Mainly on RA and BiPAP for sleep on RA (uses a BiPAP at home). Reported increased dyspnea and slightly more productive cough 3/12, so was placed on 2L NC and CXR 3/12 demonstrated small KARI opacity. Symptoms resolved without specific intervention. Also with multiple symptoms associated with his tac level (tremors, dizziness, elevated BP and general feeling of being unwell), resolved with adjustment of tacro.   - Bacterial sputum culture (3/8) with Aspergillus fumigatus (as below) and rothia mucilaginosa  - Fungal sputum culture (3/8) with C. Albicans and Aspergillus  - Continue Augmentin to 10 day through 3/19  - Nebs: Xopenex TID inhaler (adjusted neb->inhaler 3/13), continue upon discharge;  HTS 3% discontinued 3/12 as not sure if helping and could be worsening pulmonary symptoms  - Aerobika and IS hourly while awake  - Additional steroids deferred given new Aspergillus  - No significant desaturation with ambulation per nursing note on 3/13.   - CXR (3/13) with slightly improved left sided opacities, increased perihilar and right sided patchy opacities  - Repeat CT chest without contrast in ~2 weeks (around time of pulmonary follow up on 3/28)  - AC management per hematology and primary team     Aspergillus infection: on sputum culture 3/8. BDG fungitell positive (3/8) positive at 95 and Aspergillus galactomannan (3/8) positive at 1.83.    - Voriconazole (3/9) per transplant ID, will need 12 hr trough level and EKG in 7-10 days from start     Concern for PE: Initial scan done at OSH with two small RUL filling defects, but in review with radiology (Dr. Lindsay) here, it is unclear that this is not artifact given type of scanner used. He has been therapeutic on warfarin prior to admission and this is likely not the cause of his hypoxia. HONG chavis  negative.   - If acute increase in oxygenation or tachycardia without clear etiology would repeat CTA for definitive evaluation of PE presence     S/p bilateral sequential lung transplant (BSLT) for NSIP/ILD: Seen in pulmonary clinic 12/20/22 with intermittent dyspnea, PFTs with marked improvement and no acute changes on CXR.  EBV 3/7 negative.   - Pulmonary follow up currently scheduled 3/28     Immunosuppression:  - Tacrolimus HELD since 3/11 PM (prior 1.5 mg qAM / 1 mg qPM).  Goal level 8-10.  Repeat level 3/14 declinining but still supertherapeutic.  Restart at 0.5mg BID and recheck a level on Friday.  Need to avoid becoming subtherapeutic since he is currently only on 2 drug IS.  Please provide 0.5 mg tabs at discharge.   - Myfortic held 3/5 in setting of COVID (prior dose 720 mg BID), revisit resuming in ~2 weeks in outpatient  - Prednisone 5 mg qAM / 2.5 mg qPM     Prophylaxis:   - Bactrim for PJP ppx  - CMV D-/R-, although with recent CMV <35 on 1/27 (previously not detected) which raises the question of seroconversion (will keep this in mind if started on increased steroids in future), repeat 3/7 negative. Repeat CMV 3/28     Positive crossmatch:   Positive DSA: Had a retrospective positive crossmatch following transplantation, attributed to receiving rituximab.  DSA initially positive 11/29/21.  Most recent DSA on 3/8 remains positive (DQ:5) with mfi down to 551.  - Repeat DSA 3/28       Hypogammaglobulinemia: IgG adequate at 502 on 1/3.  Repeat IgG low at 168 on 3/7, s/p IVIG on 3/8.   - Repeat IgG 3/28       SALTY: Continue PTA NIPPV overnight (see RT note 3/9 for details).     We appreciate the excellent care provided by the [place] [id number] team.  Recommendations communicated via telephone and this note.  Will continue to follow along closely, please do not hesitate to call with any questions or concerns.    Abiodun Woods MD  [phone]            Interval History:   No events overnight.  Dyspnea at  rest: None  Dyspnea on exertion:  Tolerating increasing ambulation  Cough: intermittent  Sputum: yellow, lighter than previous  Hemoptysis: none   Chest Pain: none            Review of Systems:   C: NEGATIVE for fever, chills  INTEGUMENTARY/SKIN: no rash or obvious new lesions  ENT/MOUTH: no sore throat, new sinus pain or nasal drainage  RESP: see interval history  CV: NEGATIVE for chest pain, palpitations or peripheral edema  GI: no nausea, vomiting, change in stools  : no dysuria  MUSCULOSKELETAL: no myalgias, arthralgias          Medications:       amLODIPine  10 mg Oral Daily     amoxicillin-clavulanate  1 tablet Oral Q12H [facility] (08/20)     FLUoxetine  20 mg Oral Daily     insulin aspart  1-7 Units Subcutaneous TID AC     insulin aspart  1-5 Units Subcutaneous At Bedtime     lamoTRIgine  50 mg Oral QPM     levalbuterol  2 puff Inhalation TID     levothyroxine  25 mcg Oral QAM AC     mirtazapine  7.5 mg Oral At Bedtime     pantoprazole  40 mg Oral QAM AC     predniSONE  2.5 mg Oral QPM     predniSONE  5 mg Oral QAM     sodium chloride (PF)  3 mL Intracatheter Q8H     sulfamethoxazole-trimethoprim  1 tablet Oral Daily     [Held by provider] tacrolimus  1 mg Oral QAM     [Held by provider] tacrolimus  1.5 mg Oral QPM     tamsulosin  0.4 mg Oral QPM     voriconazole  250 mg Oral Q12H [facility] (08/20)     Warfarin Therapy Reminder  1 each Oral See Admin Instructions     glucose **OR** dextrose **OR** glucagon, lidocaine 4%, lidocaine (buffered or not buffered), - MEDICATION INSTRUCTIONS -, melatonin, sodium chloride (PF)         Physical Exam:   Temp:  [98.3  F (36.8  C)-98.9  F (37.2  C)] 98.3  F (36.8  C)  Pulse:  [] 90  Resp:  [18] 18  BP: (131-163)/(81-98) 131/84  SpO2:  [94 %-99 %] 98 %    Intake/Output Summary (Last 24 hours) at 3/14/2023 1001  Last data filed at 3/14/2023 0500  Gross per 24 hour   Intake 480 ml   Output 600 ml   Net -120 ml     Constitutional:   Awake, alert and in no apparent distress      Eyes:   nonicteric     ENT:    oral mucosa moist without lesions     Neck:   Supple without supraclavicular or cervical lymphadenopathy     Lungs:   Good air flow.  No crackles. No rhonchi.  Faint end exp wheezes.     Cardiovascular:   Normal S1 and S2.  RRR.  No murmur. No gallop. No rub.     Abdomen:   NABS, soft, nondistended, nontender.  No HSM.     Musculoskeletal:   No edema     Neurologic:   Alert and conversant.     Skin:   Warm, dry.  No rash on limited exam.             Data:   All laboratory and imaging data reviewed.    Results for orders placed or performed during the hospital encounter of 03/06/23 (from the past 24 hour(s))   XR Chest 2 Views    Narrative    Exam: XR CHEST 2 VIEWS, 3/13/2023 10:51 AM    Comparison: 3/11/2023    History: Added with covid, h/o left upper opacity. History of  bilateral lung transplant.    Findings:  Stable postoperative changes of the chest and upper abdomen. Clamshell  sternotomy wires intact.    Cardiac silhouette is partially obscured. No pneumothorax. Bilateral  blunting of the costophrenic angles. Increased perihilar and bibasilar  patchy opacities. Chronic right distal clavicle deformity. No  acute-appearing osseous lesions.      Impression    Impression:   1. Increased perihilar and bibasilar patchy opacities, representing  infection versus atelectasis/edema.  2. Blunting of the costophrenic angles bilaterally, representing  effusion versus scarring.    I have personally reviewed the examination and initial interpretation  and I agree with the findings.    JODI MENDEZ MD         SYSTEM ID:  [id number]   Glucose by meter   Result Value Ref Range    GLUCOSE BY METER POCT 211 (H) 70 - 99 mg/dL   Glucose by meter   Result Value Ref Range    GLUCOSE BY METER POCT 207 (H) 70 - 99 mg/dL   Glucose by meter   Result Value Ref Range    GLUCOSE BY METER POCT 140 (H) 70 - 99 mg/dL   Glucose by meter   Result Value Ref Range    GLUCOSE BY METER POCT 131 (H) 70 - 99  mg/dL   INR   Result Value Ref Range    INR 3.38 (H) 0.85 - 1.15   Basic metabolic panel   Result Value Ref Range    Sodium 140 136 - 145 mmol/L    Potassium 4.7 3.4 - 5.3 mmol/L    Chloride 106 98 - 107 mmol/L    Carbon Dioxide (CO2) 21 (L) 22 - 29 mmol/L    Anion Gap 13 7 - 15 mmol/L    Urea Nitrogen 19.2 6.0 - 20.0 mg/dL    Creatinine 1.31 (H) 0.67 - 1.17 mg/dL    Calcium 9.3 8.6 - 10.0 mg/dL    Glucose 94 70 - 99 mg/dL    GFR Estimate 63 >60 mL/min/1.73m2   Glucose by meter   Result Value Ref Range    GLUCOSE BY METER POCT 107 (H) 70 - 99 mg/dL     *Note: Due to a large number of results and/or encounters for the requested time period, some results have not been displayed. A complete set of results can be found in Results Review.

## 2023-03-14 NOTE — PHARMACY-ANTICOAGULATION SERVICE
Clinical Pharmacy- Warfarin Discharge Note  This patient is currently on warfarin for the treatment of DVT/PE treatment, also on PTA for afib.  INR Goal= 2-3  Expected length of therapy lifetime.    Warfarin PTA Regimen: 4 mg every Sunday, Tuesday, and Friday. 5 mg all other days      Anticoagulation Dose History     Recent Dosing and Labs Latest Ref Rng & Units 3/8/2023 3/9/2023 3/10/2023 3/11/2023 3/12/2023 3/13/2023 3/14/2023    Warfarin 0.5 mg - - - - - 0.5 mg 0.5 mg -    Warfarin 1 mg - - - 2 mg 2 mg - - -    Warfarin 4 mg - - 4 mg - - - - -    Warfarin 5 mg - 5 mg - - - - - -    INR 0.85 - 1.15 2.09(H) 1.79(H) 2.23(H) 2.62(H) 3.09(H) 3.50(H) 3.38(H)        Pertinent medication interactions: Bactrim (PTA), Augmentin, Voriconazole, prednisone (PTA)  Vitamin K doses administered during the last 7 days: none  FFP administered during the last 7 days: none  Recommend discharging the patient on a warfarin regimen of 0.5 mg with a prescription for warfarin 1mg tablets.      The patient should have an INR checked in 2 days.    Paulino Matias, PharmD  PGY1 Pharmacist Resident

## 2023-03-15 ENCOUNTER — TELEPHONE (OUTPATIENT)
Dept: TRANSPLANT | Facility: CLINIC | Age: 58
End: 2023-03-15
Payer: COMMERCIAL

## 2023-03-15 ENCOUNTER — TELEPHONE (OUTPATIENT)
Dept: ANTICOAGULATION | Facility: CLINIC | Age: 58
End: 2023-03-15
Payer: COMMERCIAL

## 2023-03-15 DIAGNOSIS — B44.9 ASPERGILLUS (H): ICD-10-CM

## 2023-03-15 LAB
BACTERIA SPT CULT: ABNORMAL
BACTERIA SPT CULT: ABNORMAL
GRAM STAIN RESULT: ABNORMAL

## 2023-03-15 RX ORDER — VORICONAZOLE 50 MG/1
200 TABLET, FILM COATED ORAL EVERY 12 HOURS
Qty: 240 TABLET | Refills: 0
Start: 2023-03-15 | End: 2023-03-28

## 2023-03-15 NOTE — TELEPHONE ENCOUNTER
ANTICOAGULATION  MANAGEMENT     Interacting Medication Review    Interacting medication(s): Voriconazole (Vfend) with warfarin.    Duration: 12 weeks  (3/14 to 6/1)    Indication: Pneumonia    New medication?: Yes, interaction may increase INR and risk of bleeding. Closer INR monitoring recommended.        PLAN     Continue your current warfarin dose with next INR in 1 day    Patient was discharged from the hospital with instructions to take warfarin 0.5mg daily with an INR on 3/16/23          Patient was given this information on his discharge paperwork from the hospital.     No adjustment to anticoagulation calendar was required    Plan made per [facility] anticoagulation protocol    Terrie Cedillo RN  Anticoagulation Clinic

## 2023-03-15 NOTE — TELEPHONE ENCOUNTER
Date of Admission:  3/6/2023  Date of Discharge:  3/14/2023      Pt transferred from [place] after hypoxic respiratory failure in the setting of persistent COVID positive testing and imaging initially concerning for  PE while taking therapeutic coumadin (though on further evaluation, seems less likely as treatment failure - details below).     Completed 5 day course of remdesivir ( 3/5-3/9). Sputum culture did grow aspergillus and was started on voriconazole.     Writer calling to touch base with patient post discharge from hospital.     Labs this Friday + EKG     Vori level from 3/13 came back at 5.2.   Per Dr. Woods: decrease dose to 200mg BID (pt also had trouble with supratherpetuic tac levels upon starting Vori). Recheck Vori level next week with other standing labs.          verbal instruction

## 2023-03-16 ENCOUNTER — MEDICAL CORRESPONDENCE (OUTPATIENT)
Dept: HEALTH INFORMATION MANAGEMENT | Facility: CLINIC | Age: 58
End: 2023-03-16
Payer: COMMERCIAL

## 2023-03-17 ENCOUNTER — TRANSFERRED RECORDS (OUTPATIENT)
Dept: HEALTH INFORMATION MANAGEMENT | Facility: CLINIC | Age: 58
End: 2023-03-17
Payer: COMMERCIAL

## 2023-03-17 ENCOUNTER — TELEPHONE (OUTPATIENT)
Dept: TRANSPLANT | Facility: CLINIC | Age: 58
End: 2023-03-17
Payer: COMMERCIAL

## 2023-03-17 ENCOUNTER — ANTICOAGULATION THERAPY VISIT (OUTPATIENT)
Dept: ANTICOAGULATION | Facility: CLINIC | Age: 58
End: 2023-03-17
Payer: COMMERCIAL

## 2023-03-17 ENCOUNTER — LAB (OUTPATIENT)
Dept: LAB | Facility: CLINIC | Age: 58
End: 2023-03-17
Payer: COMMERCIAL

## 2023-03-17 DIAGNOSIS — I48.91 ATRIAL FIBRILLATION, UNSPECIFIED TYPE (H): Primary | ICD-10-CM

## 2023-03-17 DIAGNOSIS — Z79.899 ENCOUNTER FOR LONG-TERM (CURRENT) USE OF HIGH-RISK MEDICATION: ICD-10-CM

## 2023-03-17 DIAGNOSIS — Z94.2 LUNG REPLACED BY TRANSPLANT (H): ICD-10-CM

## 2023-03-17 LAB — INR (EXTERNAL): 1.9 (ref 0.9–1.1)

## 2023-03-17 PROCEDURE — 80197 ASSAY OF TACROLIMUS: CPT

## 2023-03-17 NOTE — LETTER
PHYSICIAN ORDERS      DATE & TIME ISSUED: 2023 10:55 AM  PATIENT NAME: Edson Thornton Jr.   : 1965     Formerly [facility] MR# [if applicable]: [id number]     DIAGNOSIS:  Lung Transplant  Z94.2    Please obtain EKG 3/17/23    Any questions please call: Alfred [phone]    Please fax these results to [phone].      .

## 2023-03-17 NOTE — PROGRESS NOTES
ANTICOAGULATION MANAGEMENT     Edson Thornton Jr. 57 year old male is on warfarin with subtherapeutic INR result. (Goal INR 2.0-3.0)    Recent labs: (last 7 days)     03/17/23  0000   INR 1.9*       ASSESSMENT       Source(s): Chart Review and Patient/Caregiver Call       Warfarin doses taken: Warfarin taken as instructed    Diet: No new diet changes identified    New illness, injury, or hospitalization: Yes: Patient was diagnosed with Covid pneumonia and hospitalized 3/6-3/14    Medication/supplement changes: Patient is on Augmentin X 1 week and Voriconazole X 12 weeks    Signs or symptoms of bleeding or clotting: No    Previous INR: Supratherapeutic    Additional findings: None         PLAN     Recommended plan for ongoing change(s) affecting INR     Dosing Instructions: booster dose then Increase your warfarin dose (100% change) with next INR in 3 days       Summary  As of 3/17/2023    Full warfarin instructions:  3/17: 2 mg; Otherwise 1.5 mg every day   Next INR check:  3/20/2023             Telephone call with Bret who verbalizes understanding and agrees to plan and who agrees to plan and repeated back plan correctly    Patient using outside facility for labs    Education provided:     Taking warfarin: Importance of taking warfarin as instructed    Goal range and lab monitoring: goal range and significance of current result, Importance of therapeutic range and Importance of following up at instructed interval    Interaction IS anticipated between warfarin and Voriconazole    Plan made per [facility] anticoagulation protocol    Terrie Cedillo RN  Anticoagulation Clinic  3/17/2023    _______________________________________________________________________     Anticoagulation Episode Summary     Current INR goal:  2.0-3.0   TTR:  63.7 % (11.8 mo)   Target end date:  Indefinite   Send INR reminders to:  [facility]    Indications    Atrial fibrillation  unspecified type (H) [I48.91]  Encounter for long-term  (current) use of high-risk medication [Z79.899]           Comments:           Anticoagulation Care Providers     Provider Role Specialty Phone number    Abiodun Woods MD Referring Pulmonary Disease [phone]

## 2023-03-20 LAB
TACROLIMUS BLD-MCNC: 13.7 UG/L (ref 5–15)
TME LAST DOSE: NORMAL H
TME LAST DOSE: NORMAL H

## 2023-03-21 ENCOUNTER — TELEPHONE (OUTPATIENT)
Dept: TRANSPLANT | Facility: CLINIC | Age: 58
End: 2023-03-21
Payer: COMMERCIAL

## 2023-03-21 ENCOUNTER — LAB (OUTPATIENT)
Dept: LAB | Facility: CLINIC | Age: 58
End: 2023-03-21
Payer: COMMERCIAL

## 2023-03-21 ENCOUNTER — ANTICOAGULATION THERAPY VISIT (OUTPATIENT)
Dept: ANTICOAGULATION | Facility: CLINIC | Age: 58
End: 2023-03-21
Payer: COMMERCIAL

## 2023-03-21 DIAGNOSIS — Z79.899 ENCOUNTER FOR LONG-TERM (CURRENT) USE OF HIGH-RISK MEDICATION: ICD-10-CM

## 2023-03-21 DIAGNOSIS — Z94.2 LUNG REPLACED BY TRANSPLANT (H): Primary | ICD-10-CM

## 2023-03-21 DIAGNOSIS — I48.91 ATRIAL FIBRILLATION, UNSPECIFIED TYPE (H): Primary | ICD-10-CM

## 2023-03-21 PROCEDURE — 80197 ASSAY OF TACROLIMUS: CPT | Performed by: INTERNAL MEDICINE

## 2023-03-21 NOTE — TELEPHONE ENCOUNTER
Coumadin Clinic to watch for STAT prothrombin time on 10/3, to be obtained at [facility].     Call patient/wife and fax results to Dr. Kelsey.     Order in place.     Fax results to Dr. Kelsey at [phone], see telephone note on 9/27/18.   Tacrolimus level 13.7 at 13 hours, on 3/17/23.  Goal 8-10.   Current dose 0.5 mg in AM, 0.5 mg in PM    Pt states that he rechecked labs today and would like to see what level is prior to lowering dose and switching to tacrolimus suspension.    Discussed with pt.

## 2023-03-21 NOTE — PROGRESS NOTES
ANTICOAGULATION MANAGEMENT     Edson Thornton Jr. 57 year old male is on warfarin with subtherapeutic INR result. (Goal INR 2.0-3.0)    Recent labs: (last 7 days)     03/21/23  0746   INR 1.4*       ASSESSMENT       Source(s): Patient/Caregiver Call       Warfarin doses taken: Warfarin taken as instructed    Diet: No new diet changes identified    New illness, injury, or hospitalization: Yes: recently discharged from the hospital due to COVID/pneumonia     Medication/supplement changes: Finished Aumenting, but continues on Voriconazole     Signs or symptoms of bleeding or clotting: No    Previous INR: Subtherapeutic    Additional findings: None         PLAN     Recommended plan for ongoing change(s) affecting INR     Dosing Instructions: booster dose then Increase your warfarin dose (52.4% change) with next INR in 3 days       Summary  As of 3/21/2023    Full warfarin instructions:  3/21: 3 mg; Otherwise 3 mg every Sun, Wed; 2 mg all other days   Next INR check:  3/24/2023             Telephone call with Bret who verbalizes understanding and agrees to plan and who agrees to plan and repeated back plan correctly  Sent [employer]Context message with dosing and follow up instructions    Patient using outside facility for labs    Education provided:     None required    Plan made with [facility] Pharmacist Antonella Choi, RN  Anticoagulation Clinic  3/21/2023    _______________________________________________________________________     Anticoagulation Episode Summary     Current INR goal:  2.0-3.0   TTR:  62.5 % (11.8 mo)   Target end date:  Indefinite   Send INR reminders to:  [facility]    Indications    Atrial fibrillation  unspecified type (H) [I48.91]  Encounter for long-term (current) use of high-risk medication [Z79.899]           Comments:           Anticoagulation Care Providers     Provider Role Specialty Phone number    Abiodun Woods MD Referring Pulmonary Disease [phone]

## 2023-03-22 ENCOUNTER — TELEPHONE (OUTPATIENT)
Dept: TRANSPLANT | Facility: CLINIC | Age: 58
End: 2023-03-22
Payer: COMMERCIAL

## 2023-03-22 NOTE — LETTER
PHYSICIAN ORDERS      DATE & TIME ISSUED: 2023 10:44 AM  PATIENT NAME: Edson Thornton Jr.   : 1965     Formerly [facility] MR# [if applicable]: [id number]     DIAGNOSIS:  Lung Transplant  Z94.2    Please draw bmp, magnesium, cbc with platelets and Voriconazole level Friday 3/24/2023.    Any questions please call: Alfred [phone]    Please fax these results to [phone].      .

## 2023-03-22 NOTE — TELEPHONE ENCOUNTER
Pt did not get Voriconazole level drawn yesterday, local lab unable to to add-on.     Pt will get labs done again on Friday, bmp, mag, cbc with platelets, and Vori level. New orders faxed.

## 2023-03-23 DIAGNOSIS — Z00.6 RESEARCH STUDY PATIENT: Primary | ICD-10-CM

## 2023-03-23 LAB
TACROLIMUS BLD-MCNC: 11.6 UG/L (ref 5–15)
TME LAST DOSE: NORMAL H
TME LAST DOSE: NORMAL H

## 2023-03-24 ENCOUNTER — TELEPHONE (OUTPATIENT)
Dept: TRANSPLANT | Facility: CLINIC | Age: 58
End: 2023-03-24
Payer: COMMERCIAL

## 2023-03-24 ENCOUNTER — ANTICOAGULATION THERAPY VISIT (OUTPATIENT)
Dept: ANTICOAGULATION | Facility: CLINIC | Age: 58
End: 2023-03-24
Payer: COMMERCIAL

## 2023-03-24 DIAGNOSIS — D84.9 IMMUNOSUPPRESSED STATUS (H): ICD-10-CM

## 2023-03-24 DIAGNOSIS — I48.91 ATRIAL FIBRILLATION, UNSPECIFIED TYPE (H): Primary | ICD-10-CM

## 2023-03-24 DIAGNOSIS — Z79.899 ENCOUNTER FOR LONG-TERM (CURRENT) USE OF HIGH-RISK MEDICATION: ICD-10-CM

## 2023-03-24 DIAGNOSIS — Z94.2 LUNG REPLACED BY TRANSPLANT (H): Primary | ICD-10-CM

## 2023-03-24 LAB — INR (EXTERNAL): 1.3 (ref 0.9–1.1)

## 2023-03-24 NOTE — TELEPHONE ENCOUNTER
Tacrolimus level 11.6 at 12 hours, on 3/21/23.  Goal 8-10.   Current dose 0.5 mg in AM, 0.5 mg in PM    Dose changed to 0.4 mls in AM, 0.4 mls in PM   Recheck level in a week    Discussed with patient        Liquid tacrolimus Rx sent to:  [facility]   Located in: [employer]   Address: [address] [place], [place] [id number]   Phone: [phone]    Pt instructed to notify coordinator if expensive co-pay or PA needed and let RN[facility] know when he officially starts taking the liquid tacrolimus.

## 2023-03-24 NOTE — PROGRESS NOTES
ANTICOAGULATION MANAGEMENT     Edson Thornton Jr. 57 year old male is on warfarin with subtherapeutic INR result. (Goal INR 2.0-3.0)    Recent labs: (last 7 days)     03/24/23  0000   INR 1.3*       ASSESSMENT       Source(s): Patient/Caregiver Call       Warfarin doses taken: Less warfarin taken than planned which may be affecting INR    Diet: No new diet changes identified    New illness, injury, or hospitalization: Yes: recently discharged from the hospital with pneumonia and COVID    Medication/supplement changes: continues on Voriconazole     Signs or symptoms of bleeding or clotting: No    Previous INR: Subtherapeutic    Additional findings: None         PLAN     Recommended plan for ongoing change(s) affecting INR     Dosing Instructions: booster dose then Increase your warfarin dose (12.5% change) with next INR in 4 days       Summary  As of 3/24/2023    Full warfarin instructions:  3/24: 4 mg; Otherwise 2 mg every Tue, Thu, Fri; 3 mg all other days   Next INR check:  3/28/2023             Telephone call with Bret who verbalizes understanding and agrees to plan and who agrees to plan and repeated back plan correctly  Sent [employer] /l?k â€¢ splik/ message with dosing and follow up instructions    Lab visit scheduled    Education provided:     None required    Plan made per [facility] anticoagulation protocol    Suni Choi, RN  Anticoagulation Clinic  3/24/2023    _______________________________________________________________________     Anticoagulation Episode Summary     Current INR goal:  2.0-3.0   TTR:  61.7 % (11.8 mo)   Target end date:  Indefinite   Send INR reminders to:  [facility]    Indications    Atrial fibrillation  unspecified type (H) [I48.91]  Encounter for long-term (current) use of high-risk medication [Z79.899]           Comments:           Anticoagulation Care Providers     Provider Role Specialty Phone number    Abiodun Woods MD Referring Pulmonary Disease [phone]           Writer spoke to pharmacy who states they don't need the order for remover wipes but needs an order for Ostomy Pouches.     Order reprinted and awaiting review and signature by Dr Indy Haro.     Will fax back once complete

## 2023-03-27 DIAGNOSIS — Z94.2 LUNG REPLACED BY TRANSPLANT (H): Primary | ICD-10-CM

## 2023-03-28 ENCOUNTER — ANTICOAGULATION THERAPY VISIT (OUTPATIENT)
Dept: ANTICOAGULATION | Facility: CLINIC | Age: 58
End: 2023-03-28

## 2023-03-28 ENCOUNTER — OFFICE VISIT (OUTPATIENT)
Dept: TRANSPLANT | Facility: CLINIC | Age: 58
End: 2023-03-28
Attending: INTERNAL MEDICINE
Payer: COMMERCIAL

## 2023-03-28 ENCOUNTER — LAB (OUTPATIENT)
Dept: LAB | Facility: CLINIC | Age: 58
End: 2023-03-28
Payer: COMMERCIAL

## 2023-03-28 ENCOUNTER — ANCILLARY PROCEDURE (OUTPATIENT)
Dept: CT IMAGING | Facility: CLINIC | Age: 58
End: 2023-03-28
Attending: INTERNAL MEDICINE
Payer: COMMERCIAL

## 2023-03-28 ENCOUNTER — MEDICAL CORRESPONDENCE (OUTPATIENT)
Dept: HEALTH INFORMATION MANAGEMENT | Facility: CLINIC | Age: 58
End: 2023-03-28

## 2023-03-28 VITALS
HEART RATE: 67 BPM | SYSTOLIC BLOOD PRESSURE: 122 MMHG | BODY MASS INDEX: 26.43 KG/M2 | TEMPERATURE: 97.9 F | WEIGHT: 154 LBS | OXYGEN SATURATION: 95 % | DIASTOLIC BLOOD PRESSURE: 72 MMHG

## 2023-03-28 DIAGNOSIS — I48.91 ATRIAL FIBRILLATION, UNSPECIFIED TYPE (H): Primary | ICD-10-CM

## 2023-03-28 DIAGNOSIS — E83.42 HYPOMAGNESEMIA: ICD-10-CM

## 2023-03-28 DIAGNOSIS — D84.9 IMMUNOSUPPRESSED STATUS (H): ICD-10-CM

## 2023-03-28 DIAGNOSIS — Z94.2 LUNG REPLACED BY TRANSPLANT (H): ICD-10-CM

## 2023-03-28 DIAGNOSIS — B44.9 ASPERGILLUS (H): ICD-10-CM

## 2023-03-28 DIAGNOSIS — Z94.2 S/P LUNG TRANSPLANT (H): ICD-10-CM

## 2023-03-28 DIAGNOSIS — R91.1 LUNG NODULE: ICD-10-CM

## 2023-03-28 DIAGNOSIS — Z79.899 ENCOUNTER FOR LONG-TERM (CURRENT) USE OF HIGH-RISK MEDICATION: ICD-10-CM

## 2023-03-28 DIAGNOSIS — Z79.899 ENCOUNTER FOR LONG-TERM (CURRENT) USE OF HIGH-RISK MEDICATION: Primary | ICD-10-CM

## 2023-03-28 DIAGNOSIS — I10 PRIMARY HYPERTENSION: ICD-10-CM

## 2023-03-28 DIAGNOSIS — I48.91 ATRIAL FIBRILLATION, UNSPECIFIED TYPE (H): ICD-10-CM

## 2023-03-28 DIAGNOSIS — Z00.6 RESEARCH STUDY PATIENT: ICD-10-CM

## 2023-03-28 LAB
ALBUMIN SERPL BCG-MCNC: 4.5 G/DL (ref 3.5–5.2)
ALP SERPL-CCNC: 97 U/L (ref 40–129)
ALT SERPL W P-5'-P-CCNC: 25 U/L (ref 10–50)
ANION GAP SERPL CALCULATED.3IONS-SCNC: 13 MMOL/L (ref 7–15)
AST SERPL W P-5'-P-CCNC: 32 U/L (ref 10–50)
BILIRUB DIRECT SERPL-MCNC: <0.2 MG/DL (ref 0–0.3)
BILIRUB SERPL-MCNC: 0.2 MG/DL
BUN SERPL-MCNC: 28.8 MG/DL (ref 6–20)
CALCIUM SERPL-MCNC: 10 MG/DL (ref 8.6–10)
CHLORIDE SERPL-SCNC: 106 MMOL/L (ref 98–107)
CREAT SERPL-MCNC: 1.61 MG/DL (ref 0.67–1.17)
DEPRECATED HCO3 PLAS-SCNC: 24 MMOL/L (ref 22–29)
ERYTHROCYTE [DISTWIDTH] IN BLOOD BY AUTOMATED COUNT: 15.9 % (ref 10–15)
EXPTIME-PRE: 9.1 SEC
FEF2575-%PRED-PRE: 27 %
FEF2575-PRE: 0.72 L/SEC
FEF2575-PRED: 2.62 L/SEC
FEFMAX-%PRED-PRE: 59 %
FEFMAX-PRE: 4.92 L/SEC
FEFMAX-PRED: 8.24 L/SEC
FEV1-%PRED-PRE: 50 %
FEV1-PRE: 1.46 L
FEV1FEV6-PRE: 64 %
FEV1FEV6-PRED: 79 %
FEV1FVC-PRE: 63 %
FEV1FVC-PRED: 80 %
FIFMAX-PRE: 3.52 L/SEC
FVC-%PRED-PRE: 64 %
FVC-PRE: 2.32 L
FVC-PRED: 3.59 L
GFR SERPL CREATININE-BSD FRML MDRD: 50 ML/MIN/1.73M2
GLUCOSE SERPL-MCNC: 126 MG/DL (ref 70–99)
HCT VFR BLD AUTO: 39 % (ref 40–53)
HGB BLD-MCNC: 11.9 G/DL (ref 13.3–17.7)
IGG SERPL-MCNC: 650 MG/DL (ref 610–1616)
INR PPP: 1.6 (ref 0.85–1.15)
MAGNESIUM SERPL-MCNC: 2.4 MG/DL (ref 1.7–2.3)
MCH RBC QN AUTO: 24.8 PG (ref 26.5–33)
MCHC RBC AUTO-ENTMCNC: 30.5 G/DL (ref 31.5–36.5)
MCV RBC AUTO: 81 FL (ref 78–100)
PLATELET # BLD AUTO: 232 10E3/UL (ref 150–450)
POTASSIUM SERPL-SCNC: 4.9 MMOL/L (ref 3.4–5.3)
PROT SERPL-MCNC: 7.6 G/DL (ref 6.4–8.3)
RBC # BLD AUTO: 4.8 10E6/UL (ref 4.4–5.9)
SODIUM SERPL-SCNC: 143 MMOL/L (ref 136–145)
TACROLIMUS BLD-MCNC: 7.4 UG/L (ref 5–15)
TME LAST DOSE: NORMAL H
TME LAST DOSE: NORMAL H
WBC # BLD AUTO: 7.2 10E3/UL (ref 4–11)

## 2023-03-28 PROCEDURE — 80285 DRUG ASSAY VORICONAZOLE: CPT | Mod: 90 | Performed by: PATHOLOGY

## 2023-03-28 PROCEDURE — 99000 SPECIMEN HANDLING OFFICE-LAB: CPT | Performed by: PATHOLOGY

## 2023-03-28 PROCEDURE — 82248 BILIRUBIN DIRECT: CPT | Performed by: PATHOLOGY

## 2023-03-28 PROCEDURE — 87449 NOS EACH ORGANISM AG IA: CPT | Mod: 90 | Performed by: PATHOLOGY

## 2023-03-28 PROCEDURE — 83735 ASSAY OF MAGNESIUM: CPT | Performed by: PATHOLOGY

## 2023-03-28 PROCEDURE — 80053 COMPREHEN METABOLIC PANEL: CPT | Performed by: PATHOLOGY

## 2023-03-28 PROCEDURE — 85610 PROTHROMBIN TIME: CPT | Performed by: PATHOLOGY

## 2023-03-28 PROCEDURE — 87305 ASPERGILLUS AG IA: CPT | Mod: 90 | Performed by: PATHOLOGY

## 2023-03-28 PROCEDURE — 82784 ASSAY IGA/IGD/IGG/IGM EACH: CPT | Mod: 90 | Performed by: PATHOLOGY

## 2023-03-28 PROCEDURE — 80197 ASSAY OF TACROLIMUS: CPT | Mod: 90 | Performed by: PATHOLOGY

## 2023-03-28 PROCEDURE — 82785 ASSAY OF IGE: CPT | Mod: 90 | Performed by: PATHOLOGY

## 2023-03-28 PROCEDURE — 94375 RESPIRATORY FLOW VOLUME LOOP: CPT | Performed by: INTERNAL MEDICINE

## 2023-03-28 PROCEDURE — 99215 OFFICE O/P EST HI 40 MIN: CPT | Mod: 25 | Performed by: INTERNAL MEDICINE

## 2023-03-28 PROCEDURE — 36415 COLL VENOUS BLD VENIPUNCTURE: CPT | Performed by: PATHOLOGY

## 2023-03-28 PROCEDURE — G0463 HOSPITAL OUTPT CLINIC VISIT: HCPCS | Performed by: INTERNAL MEDICINE

## 2023-03-28 PROCEDURE — 71250 CT THORAX DX C-: CPT | Mod: GC | Performed by: RADIOLOGY

## 2023-03-28 PROCEDURE — 85027 COMPLETE CBC AUTOMATED: CPT | Performed by: PATHOLOGY

## 2023-03-28 RX ORDER — VORICONAZOLE 50 MG/1
250 TABLET, FILM COATED ORAL EVERY 12 HOURS
Qty: 240 TABLET | Refills: 0
Start: 2023-03-28 | End: 2023-04-10

## 2023-03-28 RX ORDER — MAGNESIUM GLYCINATE 15 %
200 POWDER (GRAM) MISCELLANEOUS 3 TIMES DAILY
Qty: 18 G | Refills: 11
Start: 2023-03-28 | End: 2023-08-01

## 2023-03-28 RX ORDER — MYCOPHENOLIC ACID 360 MG/1
360 TABLET, DELAYED RELEASE ORAL 2 TIMES DAILY
COMMUNITY
End: 2023-05-25

## 2023-03-28 ASSESSMENT — PAIN SCALES - GENERAL: PAINLEVEL: NO PAIN (0)

## 2023-03-28 NOTE — PROGRESS NOTES
"Reason for Visit  Edson Thornton Jr. is a 57 year old year old male who is being seen for RECHECK      Assessment and plan:   Edson Thornton is a 57 year old male with NSIP/ILD, bronchiectasis, moderate PH, RA, SALTY, chronic HSV infection, hypogammaglobulinemia, steroid-induced diabetes, hypothyroidism, PFO, HTN, HLD, duodenal anomaly, anxiety, and depression. Admitted on 9/5/21 from OSH for acute on chronic respiratory failure 2/2 ILD exacerbation now s/p BSLT on 10/16/21. Prolonged vent wean s/p trach and PEG/J tube.  Post-op course also complicated by encephalopathy and diffuse weakness, acute to subacute CVA, afib with RVR, BRENNAN, GI bleed, Candidemia/Candida empyema, and anxiety. Code called 11/2 for bradycardia/asystole, progressive hypotension, found to have significant GI bleed, EGD with adherent clot near PEG tube site that was clipped.  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     Pulmonary/lung transplant: Dyspnea with mild exertion, slowly improving but below recent baseline.  Likely related to resolving inflammation from recent COVID infection and deconditioning from recent hospitalization.  The patient was encouraged to exercise daily to regain conditioning.  PFTs are decreased from December but those were his post transplant best and current PFTs are similar to previous baseline.  Chest CT reviewed by me and compared to outside chest CT from 3/5/2023, marked improvement in the patchy bilateral groundglass opacities in the mid and upper lung field, new nodular opacity most evident in the left lower lobe.  Etiology is unclear but suggestive of infection.  Check IgE to evaluate for ABPA with \"finger in glove appearance\".  Increase voriconazole to 250 mg twice daily (see below).  Tacrolimus will be adjusted to maintain a level of 8-10.  Continue current prednisone.  " Myfortic was held for COVID but will be reinitiated.  Recheck chest CT with follow-up to reevaluate above-noted nodules.  If persistent or symptomatic, further evaluation including bronchoscopy will be pursued.  The patient was encouraged to exercise daily in order to improve conditioning.  Consider echocardiogram at the next visit if no further improvement in exercise tolerance.    Positive crossmatch: The patient had a retrospective positive crossmatch following transplantation, attributed to rituximab received in June.  Subsequent DSA is positive, waxing and waning but consistently below the MFI threshold for treatment.   Date DSA mfi Biopsy (date) Treatment   10/17/2021  none         10/23/2021  none         11/1/2021  none         11/15/2021  none         11/29/2021  DQ B5  2522       12/6/2021  DQ B5  1873       12/12/2021  DQ B5  1703       12/27/2021  DQ B5  3924       1/3/2022  DQ B5  3072       1/10/2022  DQ B5  4032       1/17/2022  DQB5  1849       2/3/2022 DQB5   2488       2/7/2022 DQB5  1874       2/10/2022 DQB5  1781       3/15/2022 DQB5  2254       3/21/2022 DQB5  2117       4/18/2022 DQB5   908       6/20/2022  DQB5  1100       6/29/2022  DQB5  1411       9/12/2022 DQB5  1681       11/14/2022 DQB5  891       12/20/2022  DQ B5  1553       3/8/2023  DQ B5  551             Infectious disease: The patient has completed a course of Augmentin for Rothia identified during recent hospitalization.  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  Voriconazole level is below goal, the dose will be increased to 250 mg twice daily.  He will complete at least a 3-month course.    Hypomagnesemia: Magnesium level is elevated, magnesium will be reduced to 2 tablets twice daily.    Pulmonary embolism: There was a question of small right upper lobe PE on the outside CT.  On review with radiology at [facility] it was felt this may represent artifact rather than failure of anticoagulation.  Continue chronic  anticoagulation as previous (see below).    Prophylaxis: Continue Bactrim for PJP and nocardia prophylaxis. CMV -/-.    Hypogammaglobulinemia: IgG adequate at 502 on 1/3.  Repeat IgG low at 168 on 3/7, s/p IVIG on 3/8. Repeated IgG pending.  Ophthalmology: History of double vision and visual loss.  Defer to ophthalmology for additional follow-up.  Rheumatoid arthritis: The patient reports some hand stiffness but generally adequately controlled with Voltaren gel and current immunosuppression.    Atrial fibrillation with RVR: Patient appears to be in sinus rhythm on exam today.  Continue propranolol and warfarin.    Obstructive sleep apnea: Continue CPAP.    Hypertension: Blood pressure adequately controlled with current amlodipine and propranolol.    Depression/anxiety: Adequately controlled with Cymbalta.    Reflux/GI bleed: Continue pantoprazole.    Steroid-induced diabetes: Continue current insulin regimen.    Hypothyroidism: Continue current levothyroxine.    Multiple acute/subacute ischemic strokes: etiology likely embolic vs perioperative. MRI brain 10/23 with infarcts noted in both cerebral hemispheres, left cerebellum, presumed associated with new Afib vs perioperative. Bilateral upper extremity paresis (R>L), suspicion for some cortical blindness. SBP goal < 140. Continue warfarin, HTN and BS control and rosuvastatin.    Bone health: Defer to endocrine consultants.    CKD: Creatinine elevated consistent with stage IIIa CKD.  Increased from baseline.  The patient was encouraged to maintain adequate hydration.  He requires a therapeutic dose of tacrolimus but should avoid other nephrotoxins.    Healthcare maintenance: The patient is up to date with COVID vaccination and influenza vaccination.  Annual studies will be due in November.    Follow-up in 3 months with labs, x-ray, PFTs and follow-up chest CT.    Abiodun GERARD, have spent 50 minutes on the day of the visit to review the chart, interview and  examine the patient, review labs and imaging, formulate a plan, document and submit orders. Time documented is excluding time spent for PFT interpretation.      Abiodun Woods MD  [phone]       Lung TX HPI  Transplants:  10/16/2021 (Lung), Postoperative day:  528    The patient was seen and examined by Abiodun Woods MD     Edson Thornton is a 57 year old male with NSIP/ILD, bronchiectasis, moderate PH, RA, SALTY, chronic HSV infection, hypogammaglobulinemia, steroid-induced diabetes, hypothyroidism, PFO, HTN, HLD, duodenal anomaly, anxiety, and depression. Admitted on 9/5/21 from OSH for acute on chronic respiratory failure 2/2 ILD exacerbation now s/p BSLT on 10/16/21. Prolonged vent wean s/p trach and PEG/J tube.  Post-op course also complicated by encephalopathy and diffuse weakness, acute to subacute CVA, afib with RVR, BRENNAN, GI bleed, Candidemia/Candida empyema, and anxiety. Code called 11/2 for bradycardia/asystole, progressive hypotension, found to have significant GI bleed, EGD with adherent clot near PEG tube site that was clipped.  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  The patient was admitted March 6-14, 2023 for hypoxic respiratory failure.  Previously diagnosed with COVID in February, persistently positive and March presenting to the outside hospital with hypoxic respiratory failure.  He was transferred to the [facility], treated with 5 days of remdesivir, started on voriconazole for Aspergillus and treated for Rothia with Augmentin.  Also anticoagulated for possible right apical PE on CT    The patient denies any illness since leaving the hospital on 3/14/23. He has no SOB with sitting but does have SOB with any activity including standing and walking around the house. Bret is trying to walk around his house every day to regain strength. He is coughing  occasionally but denies sputum. Denies chest pain, fever, chills, hemoptysis.     Review of Systems:  Normal appetite and weight is stable.   The patient is having an intermittent runny nose.   Loose stools 3-4 times/day, not watery.  Joint pain/stiffness in his bilateral hands attributed to RA.   Night sweats chronic and unchanged.  A complete ROS was otherwise negative except as noted in the HPI.    Current Outpatient Medications   Medication     Magnesium Glycinate POWD     MYFORTIC (BRAND) 360 MG EC tablet     voriconazole (VFEND) 50 MG tablet     acetaminophen (TYLENOL) 325 MG tablet     amLODIPine (NORVASC) 10 MG tablet     calcium carbonate 600 mg-vitamin D 400 units 600-400 MG-UNIT per tablet     diclofenac (VOLTAREN) 1 % topical gel     FLUoxetine (PROZAC) 20 MG capsule     fluticasone (FLONASE) 50 MCG/ACT nasal spray     fluticasone-salmeterol (ADVAIR) 500-50 MCG/ACT inhaler     hydroxychloroquine (PLAQUENIL) 200 MG tablet     insulin aspart (NOVOLOG PEN) 100 UNIT/ML pen     insulin pen needle (32G X 4 MM) 32G X 4 MM miscellaneous     lamoTRIgine (LAMICTAL) 25 MG tablet     levalbuterol (XOPENEX HFA) 45 MCG/ACT inhaler     levothyroxine (SYNTHROID/LEVOTHROID) 25 MCG tablet     Melatonin 10 MG TABS tablet     mirtazapine (REMERON) 7.5 MG tablet     multivitamin w/minerals (THERA-VIT-M) tablet     ondansetron (ZOFRAN-ODT) 4 MG ODT tab     pantoprazole (PROTONIX) 40 MG EC tablet     predniSONE (DELTASONE) 2.5 MG tablet     predniSONE (DELTASONE) 5 MG tablet     propranolol (INDERAL) 20 MG tablet     rOPINIRole (REQUIP) 0.25 MG tablet     rosuvastatin (CRESTOR) 10 MG tablet     senna-docusate (SENOKOT-S/PERICOLACE) 8.6-50 MG tablet     sulfamethoxazole-trimethoprim (BACTRIM) 400-80 MG tablet     tacrolimus (GENERIC EQUIVALENT) 1 mg/mL suspension     tamsulosin (FLOMAX) 0.4 MG capsule     warfarin ANTICOAGULANT (COUMADIN) 1 MG tablet     No current facility-administered medications for this visit.     No Known  Allergies  Past Medical History:   Diagnosis Date     BRENNAN (acute kidney injury) (H) 10/17/2021     Anxiety      Depression      HLD (hyperlipidemia)      HTN (hypertension)      Hypothyroidism      ILD (interstitial lung disease) (H)      Infection due to 2019 novel coronavirus 03/2023     SALTY on CPAP      Oxygen dependent     BL 4L since ~6/2021     Primary hypertension 02/28/2022     Rheumatoid arthritis (H)     signs ~5/2020, dx 5/2021     S/P lung transplant (H) 10/16/2021     Shock liver 10/17/2021     Steroid-induced hyperglycemia      Traction bronchiectasis (H)        Past Surgical History:   Procedure Laterality Date     BRONCHOSCOPY (RIGID OR FLEXIBLE), DIAGNOSTIC N/A 1/26/2022    Procedure: BRONCHOSCOPY, WITH BRONCHOALVEOLAR LAVAGE AND BIOPSY;  Surgeon: Perlman, David Morris, MD;  Location:  GI     BRONCHOSCOPY (RIGID OR FLEXIBLE), DIAGNOSTIC N/A 4/19/2022    Procedure: BRONCHOSCOPY, WITH BRONCHOALVEOLAR LAVAGE AND BIOPSY;  Surgeon: Sherine Merrill MD;  Location:  GI     BRONCHOSCOPY (RIGID OR FLEXIBLE), DIAGNOSTIC N/A 6/21/2022    Procedure: BRONCHOSCOPY, WITH BRONCHOALVEOLAR LAVAGE AND BIOPSY;  Surgeon: Aneesh Rubio MD;  Location:  GI     COLONOSCOPY W/ BIOPSIES AND POLYPECTOMY  07/21/2020     CV CORONARY ANGIOGRAM N/A 09/08/2021    Procedure: Coronary Angiogram with possible intervention;  Surgeon: Jovon Bullock MD;  Location:  HEART CARDIAC CATH LAB     CV RIGHT HEART CATH MEASUREMENTS RECORDED N/A 09/08/2021    Procedure: Right Heart Cath;  Surgeon: Jovon Bullock MD;  Location:  HEART CARDIAC CATH LAB     ESOPHAGOSCOPY, GASTROSCOPY, DUODENOSCOPY (EGD), COMBINED N/A 10/23/2021    Procedure: ESOPHAGOGASTRODUODENOSCOPY (EGD);  Surgeon: Miquel Pisano MD;  Location:  GI     ESOPHAGOSCOPY, GASTROSCOPY, DUODENOSCOPY (EGD), COMBINED N/A 11/02/2021    Procedure: ESOPHAGOGASTRODUODENOSCOPY (EGD);  Surgeon: Daneil Ortiz MD;  Location:  GI      ESOPHAGOSCOPY, GASTROSCOPY, DUODENOSCOPY (EGD), COMBINED N/A 11/5/2021    Procedure: ESOPHAGOGASTRODUODENOSCOPY (EGD);  Surgeon: Ronnell Hernandez MD;  Location: [facility]     EXTRACTION(S) DENTAL N/A 09/22/2021    Procedure: EXTRACTION tooth #19;  Surgeon: Deepak Tobin DDS;  Location: [facility] OR     HERNIA REPAIR       IR CHEST TUBE PLACEMENT NON-TUNNELLED LEFT  11/03/2021     PICC TRIPLE LUMEN PLACEMENT Left 11/04/2021    5FR TL PICC. Right non occlusive thrombus subclavian vein.     REPLACE GASTROJEJUNOSTOMY TUBE, PERCUTANEOUS N/A 11/9/2021    Procedure: REPLACEMENT, GASTROJEJUNOSTOMY TUBE, PERCUTANEOUS;  Surgeon: Hernando Rodriguez MD;  Location: [facility]     right acl       TRACHEOSTOMY PERCUTANEOUS N/A 10/29/2021    Procedure: Percutaneous Tracheostomy,;  Surgeon: Celine Jenkins MD;  Location: [facility]     TRANSPLANT LUNG RECIPIENT SINGLE X2 Bilateral 10/16/2021    Procedure: TRANSPLANT, LUNG, RECIPIENT, BILATERAL, Bronchoscopy, on-pump perfusion, bilateral clamshell sternotomy;  Surgeon: Yanick Corral MD;  Location: [facility] OR     XR ACROMIOCLAVICULAR JOINT BILATERAL         Social History     Socioeconomic History     Marital status: Single     Spouse name: Not on file     Number of children: Not on file     Years of education: Not on file     Highest education level: Not on file   Occupational History     Not on file   Tobacco Use     Smoking status: Never     Smokeless tobacco: Never   Substance and Sexual Activity     Alcohol use: Never     Drug use: Never     Sexual activity: Not on file   Other Topics Concern     Parent/sibling w/ CABG, MI or angioplasty before 65F 55M? Not Asked   Social History Narrative     Not on file     Social Determinants of Health     Financial Resource Strain: Not on file   Food Insecurity: Not on file   Transportation Needs: Not on file   Physical Activity: Not on file   Stress: Not on file   Social Connections: Not on file   Intimate Partner Violence: Not on file    Housing Stability: Not on file         /72   Pulse 67   Temp 97.9  F (36.6  C) (Oral)   Wt 69.9 kg (154 lb)   SpO2 95%   BMI 26.43 kg/m    Body mass index is 26.43 kg/m .  Exam:   GENERAL APPEARANCE: Well developed, well nourished, alert, and in no apparent distress.  EYES: PERRL, EOMI  HENT: Nasal mucosa with no edema and no hyperemia. No nasal polyps.  EARS: Canals clear, TMs normal  MOUTH: Oral mucosa is moist, without any lesions, no tonsillar enlargement, no oropharyngeal exudate.  NECK: supple, no masses, no thyromegaly.  LYMPHATICS: No significant axillary, cervical, or supraclavicular nodes.  RESP: normal percussion, good air flow throughout.  No crackles. No rhonchi. No wheezes.  CV: Normal S1, S2, regular rhythm, normal rate. No murmur.  No rub. No gallop. No LE edema.   ABDOMEN:  Bowel sounds normal, soft, nontender, no HSM or masses.   MS: (+) Clubbing. No cyanosis.  SKIN: no rash on limited exam  NEURO: Mentation intact, speech normal, normal strength and tone, normal gait and stance  PSYCH: mentation appears normal. and affect normal/bright  Results:  Recent Results (from the past 168 hour(s))   INR (External Result)    Collection Time: 03/24/23 12:00 AM   Result Value Ref Range    INR (External) 1.3 (A) 0.90 - 1.10   INR    Collection Time: 03/24/23  8:05 AM   Result Value Ref Range    PT - Prothrombine Time (External) 15.7 (H) 12.0 - 14.5 secs    INR (External) 1.3 (H) 0.9 - 1.1   Basic metabolic panel    Collection Time: 03/24/23  8:05 AM   Result Value Ref Range    Glucose (External) 121 (H) 70 - 99 mg/dL    Urea Nitrogen (External) 21 6 - 22 mg/dL    Creatinine (External) 1.20 (H) 0.73 - 1.18 mg/dL    BUN/Creatinine Ratio (External) 17.5 10.0 - 25.0    Sodium (External) 140 136 - 145 meq/L    Potassium (External) 4.6 3.5 - 5.1 meq/L    Chloride (External) 106 98 - 109 meq/L    CO2 (External) 23 22 - 31 meq/L    Anion Gap (External) 16 6 - 20 meq/L    Calcium (External) 9.7 8.5 - 10.5  mg/dL    GFR Estimated (External) 71 >=60 ml/min/1.73m2   Magnesium    Collection Time: 03/24/23  8:05 AM   Result Value Ref Range    Magnesium (External) 1.8 1.6 - 2.6 mg/dL   CBC with Platelets & Differential    Collection Time: 03/24/23  8:05 AM   Result Value Ref Range    WBC Count (External) 6.0 4.0 - 11.0 K/uL    RBC Count (External) 4.40 4.40 - 5.80 M/uL    Hemoglobin (External) 10.8 (L) 13.5 - 17.5 g/dL    Hematocrit (External) 35.8 (L) 40.0 - 50.0 %    MCV (External) 81.4 80.0 - 98.0 fL    MCH (External) 24.5 (L) 25.5 - 34.0 pg    MCHC (External) 30.2 (L) 31.5 - 36.5 g/dL    RDW (External) 15.9 (H) 11.5 - 15.5 %    Platelet Count (External) 203 140 - 400 K/uL    Absolute Neutrophils (External) 3.6 1.8 - 8.0 K/uL    Absolute Lymphocytes (External) 1.3 0.8 - 4.1 K/uL    Absolute Monocytes (External) 0.9 0.0 - 1.0 K/uL    Absolute Eosinophils (External) 0.1 0.0 - 0.7 K/uL    Absolute Basophils (External) 0.0 0.0 - 0.2 K/uL    Absolute Immature Granulocytes (External) 0.09 (H) 0.00 - 0.06 K/uL    % Neutrophils (External) 60.1 %    % Lymphocytes (External) 21.8 %    % Monocytes (External) 14.3 %    % Immature Granulocytes (External) 1.5 %    % Eosinophils (External) 1.8 %    % Basophils (External) 0.5 %    Nucleated RBCs (External) 0 0 - 1 /100 WBC   Voriconazole Level    Collection Time: 03/24/23  8:05 AM   Result Value Ref Range    Voriconizole (External) 1.3 1.0 - 5.5 mcg/mL   INFLUENZA A/B & COVID-19 PCR (EXTERNAL RESULT)    Collection Time: 03/24/23  3:05 PM   Result Value Ref Range    COVID-19 Virus by PCR (External Result) Not Detected Not Detected   Basic metabolic panel    Collection Time: 03/28/23  6:37 AM   Result Value Ref Range    Sodium 143 136 - 145 mmol/L    Potassium 4.9 3.4 - 5.3 mmol/L    Chloride 106 98 - 107 mmol/L    Carbon Dioxide (CO2) 24 22 - 29 mmol/L    Anion Gap 13 7 - 15 mmol/L    Urea Nitrogen 28.8 (H) 6.0 - 20.0 mg/dL    Creatinine 1.61 (H) 0.67 - 1.17 mg/dL    Calcium 10.0 8.6 -  10.0 mg/dL    Glucose 126 (H) 70 - 99 mg/dL    GFR Estimate 50 (L) >60 mL/min/1.73m2   Magnesium    Collection Time: 03/28/23  6:37 AM   Result Value Ref Range    Magnesium 2.4 (H) 1.7 - 2.3 mg/dL   CBC with platelets    Collection Time: 03/28/23  6:37 AM   Result Value Ref Range    WBC Count 7.2 4.0 - 11.0 10e3/uL    RBC Count 4.80 4.40 - 5.90 10e6/uL    Hemoglobin 11.9 (L) 13.3 - 17.7 g/dL    Hematocrit 39.0 (L) 40.0 - 53.0 %    MCV 81 78 - 100 fL    MCH 24.8 (L) 26.5 - 33.0 pg    MCHC 30.5 (L) 31.5 - 36.5 g/dL    RDW 15.9 (H) 10.0 - 15.0 %    Platelet Count 232 150 - 450 10e3/uL   INR    Collection Time: 03/28/23  6:37 AM   Result Value Ref Range    INR 1.60 (H) 0.85 - 1.15   Hepatic panel    Collection Time: 03/28/23  6:37 AM   Result Value Ref Range    Protein Total 7.6 6.4 - 8.3 g/dL    Albumin 4.5 3.5 - 5.2 g/dL    Bilirubin Total 0.2 <=1.2 mg/dL    Alkaline Phosphatase 97 40 - 129 U/L    AST 32 10 - 50 U/L    ALT 25 10 - 50 U/L    Bilirubin Direct <0.20 0.00 - 0.30 mg/dL   General PFT Lab (Please always keep checked)    Collection Time: 03/28/23  6:54 AM   Result Value Ref Range    FVC-Pred 3.59 L    FVC-Pre 2.32 L    FVC-%Pred-Pre 64 %    FEV1-Pre 1.46 L    FEV1-%Pred-Pre 50 %    FEV1FVC-Pred 80 %    FEV1FVC-Pre 63 %    FEFMax-Pred 8.24 L/sec    FEFMax-Pre 4.92 L/sec    FEFMax-%Pred-Pre 59 %    FEF2575-Pred 2.62 L/sec    FEF2575-Pre 0.72 L/sec    YCM8801-%Pred-Pre 27 %    ExpTime-Pre 9.10 sec    FIFMax-Pre 3.52 L/sec    FEV1FEV6-Pred 79 %    FEV1FEV6-Pre 64 %                         Results as noted above.    PFT Interpretation:  Moderate obstructive ventilatory defect.  Decreased from previous.  Below recent best.   Valid Maneuver    Scribe Disclosure:   I, JOSE LUIS PEREIRA, am serving as a scribe; to document services personally performed by Dr. Abiodun Woods- -based on data collection and the provider's statements to me.     Provider Disclosure:  I agree with above History, Review of Systems, Physical exam  and Plan.  I have reviewed the content of the documentation and have edited it as needed. I have personally performed the services documented here and the documentation accurately represents those services and the decisions I have made.      Electronically signed by:  Dr. Abiodun Woods

## 2023-03-28 NOTE — LETTER
3/28/2023         RE: Edson Thornton Jr.  [address]  [place] [address]        Dear Colleague,    Thank you for referring your patient, Edson Thornton Jr., to the [facility]. Please see a copy of my visit note below.    Transplant Coordinator Note    Reason for visit: Post lung transplant follow up visit   Coordinator: Present (annabella - in person)   Caregiver: greyson Holguin    Health concerns addressed today:  1. Resp: Slowly getting better at home after recent discharge from hospital. SOB with activity, slowly improving, standing/walking around the house. Non-productive cough occassionally. PFTs stable to improved.   2. ENT: At baseline, runny nose with clear drainage off/on.   3. GI: Appetite is okay. Soft stool, going 3-4 times/day.  4. Night sweats, has been having since transplant.   5. Creatinine up today, will wait for tacrolimus level to result.   6. Voriconazole started 3/9, through 6/9?     Vori level from 3/24 1.3, plan to stay on this for how long?     The patient was admitted from OSH on 3/6/2023 for acute hypoxic respiratory failure in setting of COVID infection and PE.  Weaned to RA during the day 3/6 and overnight (with BiPAP adjustments) 3/8.       Activity/rehab: up ad [place], walking around the house.   Oxygen needs: room air  Pain management/RX: joint/bone pain (wrist and fingers), sees rheumatology next week. Some swelling. Using Volteran Gel.   Diabetic management: on lantus, occasional novolog  Next Bronch due: 9/13/20222  CMV status: D-/R-  EBV status: D+/R+  AC/asa: on coumadin, bridged to Lovenox for bronch (lower dose per CrCl d/t bleeding post bronch x 2)  PJP prophylactic: Bactrim     COVID:  1. COVID-19 infection (yes/no, date of most recent positive test): COVID positive 2/16/23  Status/instructions given about COVID-19 vaccine: Eventually needs COVID bivalent booster       Pt Education: medications (use/dose/side effects), how/when to call  coordinator, frequency of labs, s/s of infection/rejection, call prior to starting any new medications, lab/vital sign book    Health Maintenance:     Last colonoscopy:     Next colonoscopy due:     Dermatology:    Vaccinations this visit:     Labs, CXR, PFTs reviewed with patient  Medication record reviewed and reconciled  Questions and concerns addressed    Patient Instructions  1. Continue to hydrate with 60-70 oz fluids daily.  2. Continue to exercise daily or most days of the week.  3. Follow up with your primary care provider for annual gender health maintenance procedures.  4. Follow up with colonoscopy schedule.  5. Follow up with annual dermatology visits.  6. It doesn't seem like the COVID vaccine is working well in lung transplant patients. A number of lung transplant patients have gotten sick with COVID even after receiving the vaccines. Based on our recent experience, it can be life-threatening to get COVID  even after being vaccinated. Please continue to act like you did not get the COVID vaccine - social distancing, wearing a mask, good hand hygiene, etc. If the people around you are vaccinated, it will help reduce the risk of you getting COVID. All members of your household should be vaccinated.  7. Decrease your magnesium dose to 2 tablets two times daily.   8. Resume your Myfortic at previous dose of 720mg two times daily.   9. Increase your voriconazole to 5 tablets two times daily   10. Need to work on regaining your strength     Next transplant clinic appointment: 3 months with chest CT , labs and PFTs  Next lab draw: pending tacrolimus and voriconazole level     AVS printed at time of check out          Reason for Visit  Edson Thornton  is a 57 year old year old male who is being seen for RECHECK      Assessment and plan:   Edson Thornton is a 57 year old male with NSIP/ILD, bronchiectasis, moderate PH, RA, SALTY, chronic HSV infection, hypogammaglobulinemia, steroid-induced diabetes,  "hypothyroidism, PFO, HTN, HLD, duodenal anomaly, anxiety, and depression. Admitted on 9/5/21 from OSH for acute on chronic respiratory failure 2/2 ILD exacerbation now s/p BSLT on 10/16/21. Prolonged vent wean s/p trach and PEG/J tube.  Post-op course also complicated by encephalopathy and diffuse weakness, acute to subacute CVA, afib with RVR, BRENNAN, GI bleed, Candidemia/Candida empyema, and anxiety. Code called 11/2 for bradycardia/asystole, progressive hypotension, found to have significant GI bleed, EGD with adherent clot near PEG tube site that was clipped.  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     Pulmonary/lung transplant: Dyspnea with mild exertion, slowly improving but below recent baseline.  Likely related to resolving inflammation from recent COVID infection and deconditioning from recent hospitalization.  The patient was encouraged to exercise daily to regain conditioning.  PFTs are decreased from December but those were his post transplant best and current PFTs are similar to previous baseline.  Chest CT reviewed by me and compared to outside chest CT from 3/5/2023, marked improvement in the patchy bilateral groundglass opacities in the mid and upper lung field, new nodular opacity most evident in the left lower lobe.  Etiology is unclear but suggestive of infection.  Check IgE to evaluate for ABPA with \"finger in glove appearance\".  Increase voriconazole to 250 mg twice daily (see below).  Tacrolimus will be adjusted to maintain a level of 8-10.  Continue current prednisone.  Myfortic was held for COVID but will be reinitiated.  Recheck chest CT with follow-up to reevaluate above-noted nodules.  If persistent or symptomatic, further evaluation including bronchoscopy will be pursued.  The patient was encouraged to exercise daily in order to improve conditioning.  " Consider echocardiogram at the next visit if no further improvement in exercise tolerance.    Positive crossmatch: The patient had a retrospective positive crossmatch following transplantation, attributed to rituximab received in June.  Subsequent DSA is positive, waxing and waning but consistently below the MFI threshold for treatment.   Date DSA mfi Biopsy (date) Treatment   10/17/2021  none         10/23/2021  none         11/1/2021  none         11/15/2021  none         11/29/2021  DQ B5  2522       12/6/2021  DQ B5  1873       12/12/2021  DQ B5  1703       12/27/2021  DQ B5  3924       1/3/2022  DQ B5  3072       1/10/2022  DQ B5  4032       1/17/2022  DQB5  1849       2/3/2022 DQB5   2488       2/7/2022 DQB5  1874       2/10/2022 DQB5  1781       3/15/2022 DQB5  2254       3/21/2022 DQB5  2117       4/18/2022 DQB5   908       6/20/2022  DQB5  1100       6/29/2022  DQB5  1411       9/12/2022 DQB5  1681       11/14/2022 DQB5  891       12/20/2022  DQ B5  1553       3/8/2023  DQ B5  551             Infectious disease: The patient has completed a course of Augmentin for Rothia identified during recent hospitalization.  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  Voriconazole level is below goal, the dose will be increased to 250 mg twice daily.  He will complete at least a 3-month course.    Hypomagnesemia: Magnesium level is elevated, magnesium will be reduced to 2 tablets twice daily.    Pulmonary embolism: There was a question of small right upper lobe PE on the outside CT.  On review with radiology at [place] it was felt this may represent artifact rather than failure of anticoagulation.  Continue chronic anticoagulation as previous (see below).    Prophylaxis: Continue Bactrim for PJP and nocardia prophylaxis. CMV -/-.    Hypogammaglobulinemia: IgG adequate at 502 on 1/3.  Repeat IgG low at 168 on 3/7, s/p IVIG on 3/8. Repeated IgG pending.  Ophthalmology: History of double vision and visual loss.   Defer to ophthalmology for additional follow-up.  Rheumatoid arthritis: The patient reports some hand stiffness but generally adequately controlled with Voltaren gel and current immunosuppression.    Atrial fibrillation with RVR: Patient appears to be in sinus rhythm on exam today.  Continue propranolol and warfarin.    Obstructive sleep apnea: Continue CPAP.    Hypertension: Blood pressure adequately controlled with current amlodipine and propranolol.    Depression/anxiety: Adequately controlled with Cymbalta.    Reflux/GI bleed: Continue pantoprazole.    Steroid-induced diabetes: Continue current insulin regimen.    Hypothyroidism: Continue current levothyroxine.    Multiple acute/subacute ischemic strokes: etiology likely embolic vs perioperative. MRI brain 10/23 with infarcts noted in both cerebral hemispheres, left cerebellum, presumed associated with new Afib vs perioperative. Bilateral upper extremity paresis (R>L), suspicion for some cortical blindness. SBP goal < 140. Continue warfarin, HTN and BS control and rosuvastatin.    Bone health: Defer to endocrine consultants.    CKD: Creatinine elevated consistent with stage IIIa CKD.  Increased from baseline.  The patient was encouraged to maintain adequate hydration.  He requires a therapeutic dose of tacrolimus but should avoid other nephrotoxins.    Healthcare maintenance: The patient is up to date with COVID vaccination and influenza vaccination.  Annual studies will be due in November.    Follow-up in 3 months with labs, x-ray, PFTs and follow-up chest CT.    IAbiodun, have spent 50 minutes on the day of the visit to review the chart, interview and examine the patient, review labs and imaging, formulate a plan, document and submit orders. Time documented is excluding time spent for PFT interpretation.      Abiodun Woods MD  [phone]       Lung TX HPI  Transplants:  10/16/2021 (Lung), Postoperative day:  528    The patient was seen and examined by  Abiodun Woods MD     Edson Thornton is a 57 year old male with NSIP/ILD, bronchiectasis, moderate PH, RA, SALTY, chronic HSV infection, hypogammaglobulinemia, steroid-induced diabetes, hypothyroidism, PFO, HTN, HLD, duodenal anomaly, anxiety, and depression. Admitted on 9/5/21 from OSH for acute on chronic respiratory failure 2/2 ILD exacerbation now s/p BSLT on 10/16/21. Prolonged vent wean s/p trach and PEG/J tube.  Post-op course also complicated by encephalopathy and diffuse weakness, acute to subacute CVA, afib with RVR, BRENNAN, GI bleed, Candidemia/Candida empyema, and anxiety. Code called 11/2 for bradycardia/asystole, progressive hypotension, found to have significant GI bleed, EGD with adherent clot near PEG tube site that was clipped.  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  The patient was admitted March 6-14, 2023 for hypoxic respiratory failure.  Previously diagnosed with COVID in February, persistently positive and March presenting to the outside hospital with hypoxic respiratory failure.  He was transferred to the [facility], treated with 5 days of remdesivir, started on voriconazole for Aspergillus and treated for Rothia with Augmentin.  Also anticoagulated for possible right apical PE on CT    The patient denies any illness since leaving the hospital on 3/14/23. He has no SOB with sitting but does have SOB with any activity including standing and walking around the house. [place] is trying to walk around his house every day to regain strength. He is coughing occasionally but denies sputum. Denies chest pain, fever, chills, hemoptysis.     Review of Systems:  Normal appetite and weight is stable.   The patient is having an intermittent runny nose.   Loose stools 3-4 times/day, not watery.  Joint pain/stiffness in his bilateral hands attributed to RA.   Night  sweats chronic and unchanged.  A complete ROS was otherwise negative except as noted in the HPI.    Current Outpatient Medications   Medication     Magnesium Glycinate POWD     MYFORTIC (BRAND) 360 MG EC tablet     voriconazole (VFEND) 50 MG tablet     acetaminophen (TYLENOL) 325 MG tablet     amLODIPine (NORVASC) 10 MG tablet     calcium carbonate 600 mg-vitamin D 400 units 600-400 MG-UNIT per tablet     diclofenac (VOLTAREN) 1 % topical gel     FLUoxetine (PROZAC) 20 MG capsule     fluticasone (FLONASE) 50 MCG/ACT nasal spray     fluticasone-salmeterol (ADVAIR) 500-50 MCG/ACT inhaler     hydroxychloroquine (PLAQUENIL) 200 MG tablet     insulin aspart (NOVOLOG PEN) 100 UNIT/ML pen     insulin pen needle (32G X 4 MM) 32G X 4 MM miscellaneous     lamoTRIgine (LAMICTAL) 25 MG tablet     levalbuterol (XOPENEX HFA) 45 MCG/ACT inhaler     levothyroxine (SYNTHROID/LEVOTHROID) 25 MCG tablet     Melatonin 10 MG TABS tablet     mirtazapine (REMERON) 7.5 MG tablet     multivitamin w/minerals (THERA-VIT-M) tablet     ondansetron (ZOFRAN-ODT) 4 MG ODT tab     pantoprazole (PROTONIX) 40 MG EC tablet     predniSONE (DELTASONE) 2.5 MG tablet     predniSONE (DELTASONE) 5 MG tablet     propranolol (INDERAL) 20 MG tablet     rOPINIRole (REQUIP) 0.25 MG tablet     rosuvastatin (CRESTOR) 10 MG tablet     senna-docusate (SENOKOT-S/PERICOLACE) 8.6-50 MG tablet     sulfamethoxazole-trimethoprim (BACTRIM) 400-80 MG tablet     tacrolimus (GENERIC EQUIVALENT) 1 mg/mL suspension     tamsulosin (FLOMAX) 0.4 MG capsule     warfarin ANTICOAGULANT (COUMADIN) 1 MG tablet     No current facility-administered medications for this visit.     No Known Allergies  Past Medical History:   Diagnosis Date     BRENNAN (acute kidney injury) (H) 10/17/2021     Anxiety      Depression      HLD (hyperlipidemia)      HTN (hypertension)      Hypothyroidism      ILD (interstitial lung disease) (H)      Infection due to 2019 novel coronavirus 03/2023     SALTY on CPAP       Oxygen dependent     BL 4L since ~6/2021     Primary hypertension 02/28/2022     Rheumatoid arthritis (H)     signs ~5/2020, dx 5/2021     S/P lung transplant (H) 10/16/2021     Shock liver 10/17/2021     Steroid-induced hyperglycemia      Traction bronchiectasis (H)        Past Surgical History:   Procedure Laterality Date     BRONCHOSCOPY (RIGID OR FLEXIBLE), DIAGNOSTIC N/A 1/26/2022    Procedure: BRONCHOSCOPY, WITH BRONCHOALVEOLAR LAVAGE AND BIOPSY;  Surgeon: Perlman, David Morris, MD;  Location: [facility]     BRONCHOSCOPY (RIGID OR FLEXIBLE), DIAGNOSTIC N/A 4/19/2022    Procedure: BRONCHOSCOPY, WITH BRONCHOALVEOLAR LAVAGE AND BIOPSY;  Surgeon: Sherine Merrill MD;  Location: [facility]     BRONCHOSCOPY (RIGID OR FLEXIBLE), DIAGNOSTIC N/A 6/21/2022    Procedure: BRONCHOSCOPY, WITH BRONCHOALVEOLAR LAVAGE AND BIOPSY;  Surgeon: Aneesh Rubio MD;  Location:  GI     COLONOSCOPY W/ BIOPSIES AND POLYPECTOMY  07/21/2020     CV CORONARY ANGIOGRAM N/A 09/08/2021    Procedure: Coronary Angiogram with possible intervention;  Surgeon: Jovon Bullock MD;  Location:  HEART CARDIAC CATH LAB     CV RIGHT HEART CATH MEASUREMENTS RECORDED N/A 09/08/2021    Procedure: Right Heart Cath;  Surgeon: Jovon Bullock MD;  Location:  HEART CARDIAC CATH LAB     ESOPHAGOSCOPY, GASTROSCOPY, DUODENOSCOPY (EGD), COMBINED N/A 10/23/2021    Procedure: ESOPHAGOGASTRODUODENOSCOPY (EGD);  Surgeon: Miquel Pisano MD;  [facility]     ESOPHAGOSCOPY, GASTROSCOPY, DUODENOSCOPY (EGD), COMBINED N/A 11/02/2021    Procedure: ESOPHAGOGASTRODUODENOSCOPY (EGD);  Surgeon: Daniel Ortiz MD;  [facility]     ESOPHAGOSCOPY, GASTROSCOPY, DUODENOSCOPY (EGD), COMBINED N/A 11/5/2021    Procedure: ESOPHAGOGASTRODUODENOSCOPY (EGD);  Surgeon: Ronnell Hernandez MD;  Location: [facility]     EXTRACTION(S) DENTAL N/A 09/22/2021    Procedure: EXTRACTION tooth #19;  Surgeon: Deepak Tobin DDS;  Location: [facility]     HERNIA  REPAIR       IR CHEST TUBE PLACEMENT NON-TUNNELLED LEFT  11/03/2021     PICC TRIPLE LUMEN PLACEMENT Left 11/04/2021    5FR TL PICC. Right non occlusive thrombus subclavian vein.     REPLACE GASTROJEJUNOSTOMY TUBE, PERCUTANEOUS N/A 11/9/2021    Procedure: REPLACEMENT, GASTROJEJUNOSTOMY TUBE, PERCUTANEOUS;  Surgeon: Hernando Rodriguez MD;  Location: [facility]     right acl       TRACHEOSTOMY PERCUTANEOUS N/A 10/29/2021    Procedure: Percutaneous Tracheostomy,;  Surgeon: Celine Jenkins MD;  Location: [facility]     TRANSPLANT LUNG RECIPIENT SINGLE X2 Bilateral 10/16/2021    Procedure: TRANSPLANT, LUNG, RECIPIENT, BILATERAL, Bronchoscopy, on-pump perfusion, bilateral clamshell sternotomy;  Surgeon: Yanick Corral MD;  Location: [facility]     XR ACROMIOCLAVICULAR JOINT BILATERAL         Social History     Socioeconomic History     Marital status: Single     Spouse name: Not on file     Number of children: Not on file     Years of education: Not on file     Highest education level: Not on file   Occupational History     Not on file   Tobacco Use     Smoking status: Never     Smokeless tobacco: Never   Substance and Sexual Activity     Alcohol use: Never     Drug use: Never     Sexual activity: Not on file   Other Topics Concern     Parent/sibling w/ CABG, MI or angioplasty before 65F 55M? Not Asked   Social History Narrative     Not on file     Social Determinants of Health     Financial Resource Strain: Not on file   Food Insecurity: Not on file   Transportation Needs: Not on file   Physical Activity: Not on file   Stress: Not on file   Social Connections: Not on file   Intimate Partner Violence: Not on file   Housing Stability: Not on file         /72   Pulse 67   Temp 97.9  F (36.6  C) (Oral)   Wt 69.9 kg (154 lb)   SpO2 95%   BMI 26.43 kg/m    Body mass index is 26.43 kg/m .  Exam:   GENERAL APPEARANCE: Well developed, well nourished, alert, and in no apparent distress.  EYES: PERRL, EOMI  HENT: Nasal  mucosa with no edema and no hyperemia. No nasal polyps.  EARS: Canals clear, TMs normal  MOUTH: Oral mucosa is moist, without any lesions, no tonsillar enlargement, no oropharyngeal exudate.  NECK: supple, no masses, no thyromegaly.  LYMPHATICS: No significant axillary, cervical, or supraclavicular nodes.  RESP: normal percussion, good air flow throughout.  No crackles. No rhonchi. No wheezes.  CV: Normal S1, S2, regular rhythm, normal rate. No murmur.  No rub. No gallop. No LE edema.   ABDOMEN:  Bowel sounds normal, soft, nontender, no HSM or masses.   MS: (+) Clubbing. No cyanosis.  SKIN: no rash on limited exam  NEURO: Mentation intact, speech normal, normal strength and tone, normal gait and stance  PSYCH: mentation appears normal. and affect normal/bright  Results:  Recent Results (from the past 168 hour(s))   INR (External Result)    Collection Time: 03/24/23 12:00 AM   Result Value Ref Range    INR (External) 1.3 (A) 0.90 - 1.10   INR    Collection Time: 03/24/23  8:05 AM   Result Value Ref Range    PT - Prothrombine Time (External) 15.7 (H) 12.0 - 14.5 secs    INR (External) 1.3 (H) 0.9 - 1.1   Basic metabolic panel    Collection Time: 03/24/23  8:05 AM   Result Value Ref Range    Glucose (External) 121 (H) 70 - 99 mg/dL    Urea Nitrogen (External) 21 6 - 22 mg/dL    Creatinine (External) 1.20 (H) 0.73 - 1.18 mg/dL    BUN/Creatinine Ratio (External) 17.5 10.0 - 25.0    Sodium (External) 140 136 - 145 meq/L    Potassium (External) 4.6 3.5 - 5.1 meq/L    Chloride (External) 106 98 - 109 meq/L    CO2 (External) 23 22 - 31 meq/L    Anion Gap (External) 16 6 - 20 meq/L    Calcium (External) 9.7 8.5 - 10.5 mg/dL    GFR Estimated (External) 71 >=60 ml/min/1.73m2   Magnesium    Collection Time: 03/24/23  8:05 AM   Result Value Ref Range    Magnesium (External) 1.8 1.6 - 2.6 mg/dL   CBC with Platelets & Differential    Collection Time: 03/24/23  8:05 AM   Result Value Ref Range    WBC Count (External) 6.0 4.0 - 11.0  K/uL    RBC Count (External) 4.40 4.40 - 5.80 M/uL    Hemoglobin (External) 10.8 (L) 13.5 - 17.5 g/dL    Hematocrit (External) 35.8 (L) 40.0 - 50.0 %    MCV (External) 81.4 80.0 - 98.0 fL    MCH (External) 24.5 (L) 25.5 - 34.0 pg    MCHC (External) 30.2 (L) 31.5 - 36.5 g/dL    RDW (External) 15.9 (H) 11.5 - 15.5 %    Platelet Count (External) 203 140 - 400 K/uL    Absolute Neutrophils (External) 3.6 1.8 - 8.0 K/uL    Absolute Lymphocytes (External) 1.3 0.8 - 4.1 K/uL    Absolute Monocytes (External) 0.9 0.0 - 1.0 K/uL    Absolute Eosinophils (External) 0.1 0.0 - 0.7 K/uL    Absolute Basophils (External) 0.0 0.0 - 0.2 K/uL    Absolute Immature Granulocytes (External) 0.09 (H) 0.00 - 0.06 K/uL    % Neutrophils (External) 60.1 %    % Lymphocytes (External) 21.8 %    % Monocytes (External) 14.3 %    % Immature Granulocytes (External) 1.5 %    % Eosinophils (External) 1.8 %    % Basophils (External) 0.5 %    Nucleated RBCs (External) 0 0 - 1 /100 WBC   Voriconazole Level    Collection Time: 03/24/23  8:05 AM   Result Value Ref Range    Voriconizole (External) 1.3 1.0 - 5.5 mcg/mL   INFLUENZA A/B & COVID-19 PCR (EXTERNAL RESULT)    Collection Time: 03/24/23  3:05 PM   Result Value Ref Range    COVID-19 Virus by PCR (External Result) Not Detected Not Detected   Basic metabolic panel    Collection Time: 03/28/23  6:37 AM   Result Value Ref Range    Sodium 143 136 - 145 mmol/L    Potassium 4.9 3.4 - 5.3 mmol/L    Chloride 106 98 - 107 mmol/L    Carbon Dioxide (CO2) 24 22 - 29 mmol/L    Anion Gap 13 7 - 15 mmol/L    Urea Nitrogen 28.8 (H) 6.0 - 20.0 mg/dL    Creatinine 1.61 (H) 0.67 - 1.17 mg/dL    Calcium 10.0 8.6 - 10.0 mg/dL    Glucose 126 (H) 70 - 99 mg/dL    GFR Estimate 50 (L) >60 mL/min/1.73m2   Magnesium    Collection Time: 03/28/23  6:37 AM   Result Value Ref Range    Magnesium 2.4 (H) 1.7 - 2.3 mg/dL   CBC with platelets    Collection Time: 03/28/23  6:37 AM   Result Value Ref Range    WBC Count 7.2 4.0 - 11.0  10e3/uL    RBC Count 4.80 4.40 - 5.90 10e6/uL    Hemoglobin 11.9 (L) 13.3 - 17.7 g/dL    Hematocrit 39.0 (L) 40.0 - 53.0 %    MCV 81 78 - 100 fL    MCH 24.8 (L) 26.5 - 33.0 pg    MCHC 30.5 (L) 31.5 - 36.5 g/dL    RDW 15.9 (H) 10.0 - 15.0 %    Platelet Count 232 150 - 450 10e3/uL   INR    Collection Time: 03/28/23  6:37 AM   Result Value Ref Range    INR 1.60 (H) 0.85 - 1.15   Hepatic panel    Collection Time: 03/28/23  6:37 AM   Result Value Ref Range    Protein Total 7.6 6.4 - 8.3 g/dL    Albumin 4.5 3.5 - 5.2 g/dL    Bilirubin Total 0.2 <=1.2 mg/dL    Alkaline Phosphatase 97 40 - 129 U/L    AST 32 10 - 50 U/L    ALT 25 10 - 50 U/L    Bilirubin Direct <0.20 0.00 - 0.30 mg/dL   General PFT Lab (Please always keep checked)    Collection Time: 03/28/23  6:54 AM   Result Value Ref Range    FVC-Pred 3.59 L    FVC-Pre 2.32 L    FVC-%Pred-Pre 64 %    FEV1-Pre 1.46 L    FEV1-%Pred-Pre 50 %    FEV1FVC-Pred 80 %    FEV1FVC-Pre 63 %    FEFMax-Pred 8.24 L/sec    FEFMax-Pre 4.92 L/sec    FEFMax-%Pred-Pre 59 %    FEF2575-Pred 2.62 L/sec    FEF2575-Pre 0.72 L/sec    TZL6618-%Pred-Pre 27 %    ExpTime-Pre 9.10 sec    FIFMax-Pre 3.52 L/sec    FEV1FEV6-Pred 79 %    FEV1FEV6-Pre 64 %                         Results as noted above.    PFT Interpretation:  Moderate obstructive ventilatory defect.  Decreased from previous.  Below recent best.   Valid Maneuver    Scribe Disclosure:   I, JOSE LUIS PEREIRA, am serving as a scribe; to document services personally performed by Dr. Abiodun Woods- -based on data collection and the provider's statements to me.     Provider Disclosure:  I agree with above History, Review of Systems, Physical exam and Plan.  I have reviewed the content of the documentation and have edited it as needed. I have personally performed the services documented here and the documentation accurately represents those services and the decisions I have made.      Electronically signed by:  Dr. Abiodun Woods            Again,  thank you for allowing me to participate in the care of your patient.        Sincerely,        Abiodun Woods MD

## 2023-03-28 NOTE — PROGRESS NOTES
ANTICOAGULATION MANAGEMENT     Edson Thornton Jr. 57 year old male is on warfarin with subtherapeutic INR result. (Goal INR 2.0-3.0)    Recent labs: (last 7 days)     03/28/23  0637   INR 1.60*       ASSESSMENT       Source(s): Chart Review and Patient/Caregiver Call       Warfarin doses taken: Booster dose(s) recently taken which may be affecting INR    Diet: No new diet changes identified    New illness, injury, or hospitalization: Yes: treating Aspergillus    Medication/supplement changes: Voriconazole dose increased on 3/28/23 Boost dose recommended on 3/28.  Patient pre-interaction dosing recorded under calendar    Signs or symptoms of bleeding or clotting: No    Previous INR: Subtherapeutic    Additional findings: Consulted Antonella Ren RpH         PLAN     Recommended plan for ongoing change(s) affecting INR     Dosing Instructions: Increase your warfarin dose (16% change) with next INR in 5 days       Summary  As of 3/28/2023    Full warfarin instructions:  3/28: 4 mg; Otherwise 3 mg every day   Next INR check:  4/3/2023             Telephone call with Bret who verbalizes understanding and agrees to plan and who agrees to plan and repeated back plan correctly    Patient using outside facility for labs    Education provided:     Interaction IS anticipated between warfarin and Voriconazole    Symptom monitoring: monitoring for bleeding signs and symptoms and monitoring for clotting signs and symptoms    Importance of notifying anticoagulation clinic for: changes in medications; a sooner lab recheck maybe needed, diarrhea, nausea/vomiting, reduced intake, cold/flu, and/or infections; a sooner lab recheck maybe needed and if you did not receive dosing instructions on the same day as your labs were checked    Plan made with [facility] Pharmacist Nick Hobbs, RN  Anticoagulation Clinic  3/28/2023    _______________________________________________________________________     Anticoagulation Episode  Summary     Current INR goal:  2.0-3.0   TTR:  60.6 % (11.8 mo)   Target end date:  Indefinite   Send INR reminders to:  [facility]    Indications    Atrial fibrillation  unspecified type (H) [I48.91]  Encounter for long-term (current) use of high-risk medication [Z79.899]           Comments:           Anticoagulation Care Providers     Provider Role Specialty Phone number    Abiodun Woods MD Referring Pulmonary Disease [phone]        Increase your voriconazole to 5 tablets two times daily

## 2023-03-28 NOTE — PATIENT INSTRUCTIONS
Patient Instructions  1. Continue to hydrate with 60-70 oz fluids daily.  2. Continue to exercise daily or most days of the week.  3. Follow up with your primary care provider for annual gender health maintenance procedures.  4. Follow up with colonoscopy schedule.  5. Follow up with annual dermatology visits.  6. It doesn't seem like the COVID vaccine is working well in lung transplant patients. A number of lung transplant patients have gotten sick with COVID even after receiving the vaccines. Based on our recent experience, it can be life-threatening to get COVID  even after being vaccinated. Please continue to act like you did not get the COVID vaccine - social distancing, wearing a mask, good hand hygiene, etc. If the people around you are vaccinated, it will help reduce the risk of you getting COVID. All members of your household should be vaccinated.  7. Decrease your magnesium dose to 2 tablets two times daily.   8. Resume your Myfortic at previous dose of 720mg two times daily.   9. Increase your voriconazole to 5 tablets two times daily   10. Need to work on regaining your strength     Next transplant clinic appointment: 3 months with chest CT , labs and PFTs  Next lab draw: pending tacrolimus and voriconazole level     AVS printed at time of check out          ~~~~~~~~~~~~~~~~~~~~~~~~~    Thoracic Transplant Office phone [phone], fax [phone]    Office Hours 8:30 - 5:00     For after-hours urgent issues, please dial [phone] and ask the  to page the Thoracic Transplant Coordinator On-Call.   --------------------  To expedite your medication refill(s), please contact your pharmacy and have them fax a refill request to: [phone]    *Please allow 3 business days for routine medication refills.  *Please allow 5 business days for controlled substance medication refills.    **For Diabetic medications and supplies refill(s), please contact your pharmacy and have them contact your  Endocrine team.  --------------------  For scheduling appointments call [phone].  --------------------  Please Note: If you are active on [employer], all future test results will be sent by [employer] message only, and will no longer be called to patient. You may also receive communication directly from your physician.

## 2023-03-28 NOTE — NURSING NOTE
Chief Complaint   Patient presents with     RECHECK       /72   Pulse 67   Temp 97.9  F (36.6  C) (Oral)   Wt 69.9 kg (154 lb)   SpO2 95%   BMI 26.43 kg/m      Ehsan Tong on 3/28/2023 at 8:31 AM

## 2023-03-28 NOTE — RESULT ENCOUNTER NOTE
Tacrolimus level 7.4 at 10.5 hours, on 3/28/23.  Goal 8-10.   Current dose 0.4 mls in AM, 0.4 mls in PM    No change in dose as patient likely at goal at 12 hour cr     [employer] message sent

## 2023-03-28 NOTE — PROGRESS NOTES
Transplant Coordinator Note    Reason for visit: Post lung transplant follow up visit   Coordinator: Present (annabella - in person)   Caregiver: greyson Holguin    Health concerns addressed today:  1. Resp: Slowly getting better at home after recent discharge from hospital. SOB with activity, slowly improving, standing/walking around the house. Non-productive cough occassionally. PFTs stable to improved.   2. ENT: At baseline, runny nose with clear drainage off/on.   3. GI: Appetite is okay. Soft stool, going 3-4 times/day.  4. Night sweats, has been having since transplant.   5. Creatinine up today, will wait for tacrolimus level to result.   6. Voriconazole started 3/9, through 6/9?     Vori level from 3/24 1.3, plan to stay on this for how long?     The patient was admitted from OSH on 3/6/2023 for acute hypoxic respiratory failure in setting of COVID infection and PE.  Weaned to RA during the day 3/6 and overnight (with BiPAP adjustments) 3/8.       Activity/rehab: up ad [place], walking around the house.   Oxygen needs: room air  Pain management/RX: joint/bone pain (wrist and fingers), sees rheumatology next week. Some swelling. Using Volteran Gel.   Diabetic management: on lantus, occasional novolog  Next Bronch due: 9/13/20222  CMV status: D-/R-  EBV status: D+/R+  AC/asa: on coumadin, bridged to Lovenox for bronch (lower dose per CrCl d/t bleeding post bronch x 2)  PJP prophylactic: Bactrim     COVID:  1. COVID-19 infection (yes/no, date of most recent positive test): COVID positive 2/16/23  Status/instructions given about COVID-19 vaccine: Eventually needs COVID bivalent booster       Pt Education: medications (use/dose/side effects), how/when to call coordinator, frequency of labs, s/s of infection/rejection, call prior to starting any new medications, lab/vital sign book    Health Maintenance:     Last colonoscopy:     Next colonoscopy due:     Dermatology:    Vaccinations this visit:     Labs, CXR, PFTs reviewed  with patient  Medication record reviewed and reconciled  Questions and concerns addressed    Patient Instructions  1. Continue to hydrate with 60-70 oz fluids daily.  2. Continue to exercise daily or most days of the week.  3. Follow up with your primary care provider for annual gender health maintenance procedures.  4. Follow up with colonoscopy schedule.  5. Follow up with annual dermatology visits.  6. It doesn't seem like the COVID vaccine is working well in lung transplant patients. A number of lung transplant patients have gotten sick with COVID even after receiving the vaccines. Based on our recent experience, it can be life-threatening to get COVID  even after being vaccinated. Please continue to act like you did not get the COVID vaccine - social distancing, wearing a mask, good hand hygiene, etc. If the people around you are vaccinated, it will help reduce the risk of you getting COVID. All members of your household should be vaccinated.  7. Decrease your magnesium dose to 2 tablets two times daily.   8. Resume your Myfortic at previous dose of 720mg two times daily.   9. Increase your voriconazole to 5 tablets two times daily   10. Need to work on regaining your strength     Next transplant clinic appointment: 3 months with chest CT , labs and PFTs  Next lab draw: pending tacrolimus and voriconazole level     AVS printed at time of check out

## 2023-03-29 ENCOUNTER — REFERRAL (OUTPATIENT)
Dept: TRANSPLANT | Facility: CLINIC | Age: 58
End: 2023-03-29
Payer: COMMERCIAL

## 2023-03-29 LAB
1,3 BETA GLUCAN SER-MCNC: >500 PG/ML
ATRIAL RATE - MUSE: 67 BPM
CMV DNA SPEC NAA+PROBE-ACNC: NOT DETECTED IU/ML
DIASTOLIC BLOOD PRESSURE - MUSE: NORMAL MMHG
GALACTOMANNAN AG SERPL QL IA: NEGATIVE
GALACTOMANNAN AG SPEC IA-ACNC: 0.11
INTERPRETATION ECG - MUSE: NORMAL
OBSERVATION IMP: POSITIVE
P AXIS - MUSE: 48 DEGREES
PR INTERVAL - MUSE: 160 MS
QRS DURATION - MUSE: 80 MS
QT - MUSE: 392 MS
QTC - MUSE: 414 MS
R AXIS - MUSE: 50 DEGREES
SYSTOLIC BLOOD PRESSURE - MUSE: NORMAL MMHG
T AXIS - MUSE: 53 DEGREES
VENTRICULAR RATE- MUSE: 67 BPM

## 2023-03-29 NOTE — LETTER
PHYSICIAN ORDERS      DATE & TIME ISSUED: 2023 10:39 AM  PATIENT NAME: Edson Thornton Jr.   : 1965     [facility] MR# [if applicable]: [id number]     DIAGNOSIS:  Lung Transplant  Z94.2  Please check Voriconazole level on  along with other standing labs (bmp, mag, cbc with platelets, tacrolimus level)       Any questions please call: Alfred [phone]    Please fax these results to [phone].      .

## 2023-03-30 LAB — VORICONAZOLE SERPL-MCNC: 1.8 UG/ML (ref 1–5.5)

## 2023-03-31 DIAGNOSIS — Z94.2 S/P LUNG TRANSPLANT (H): Primary | ICD-10-CM

## 2023-03-31 LAB — IGE SERPL-ACNC: 5 KU/L (ref 0–114)

## 2023-03-31 NOTE — PROGRESS NOTES
Talked with Dr. Woods after official read in of CT scan in and fungitell increase from 3 weeks ago.     Vori increased at clinic visit earlier this week.     Will move up patients clinic appointment to 2 months with repeat CT scan.     Repeat fungitell in month.     Decrease Myfortic to 360mg BID due to increasing fungitell.

## 2023-04-03 ENCOUNTER — ANTICOAGULATION THERAPY VISIT (OUTPATIENT)
Dept: ANTICOAGULATION | Facility: CLINIC | Age: 58
End: 2023-04-03
Payer: COMMERCIAL

## 2023-04-03 DIAGNOSIS — I48.91 ATRIAL FIBRILLATION, UNSPECIFIED TYPE (H): Primary | ICD-10-CM

## 2023-04-03 DIAGNOSIS — Z79.899 ENCOUNTER FOR LONG-TERM (CURRENT) USE OF HIGH-RISK MEDICATION: ICD-10-CM

## 2023-04-03 LAB — INR (EXTERNAL): 3.4 (ref 0.9–1.1)

## 2023-04-03 NOTE — PROGRESS NOTES
ANTICOAGULATION MANAGEMENT     Edson Thornton Jr. 57 year old male is on warfarin with supratherapeutic INR result. (Goal INR 2.0-3.0)    Recent labs: (last 7 days)     04/03/23  0000   INR 3.4*       ASSESSMENT       Source(s): Chart Review and Patient/Caregiver Call       Warfarin doses taken: Warfarin taken as instructed    Diet: No new diet changes identified    New illness, injury, or hospitalization: No    Medication/supplement changes: on Voriconazole X 12 weeks    Signs or symptoms of bleeding or clotting: No    Previous INR: Subtherapeutic    Additional findings: pt is doing great         PLAN     Recommended plan for ongoing change(s) affecting INR     Dosing Instructions: decrease your warfarin dose (9.5% change) with next INR in 1 week       Summary  As of 4/3/2023    Full warfarin instructions:  2 mg every Mon, Thu; 3 mg all other days   Next INR check:  4/10/2023             Telephone call with Bret who verbalizes understanding and agrees to plan and who agrees to plan and repeated back plan correctly  Sent [employer] (deja mi) message with dosing and follow up instructions    Patient using outside facility for labs    Education provided:     Goal range and lab monitoring: goal range and significance of current result    Contact [phone] with any changes, questions or concerns.     Plan made with [facility] Pharmacist Antonella Lopez, RN  Anticoagulation Clinic  4/3/2023    _______________________________________________________________________     Anticoagulation Episode Summary     Current INR goal:  2.0-3.0   TTR:  59.7 % (11.8 mo)   Target end date:  Indefinite   Send INR reminders to:  [facility]    Indications    Atrial fibrillation  unspecified type (H) [I48.91]  Encounter for long-term (current) use of high-risk medication [Z79.899]           Comments:           Anticoagulation Care Providers     Provider Role Specialty Phone number    Abiodun Woods MD Referring  Pulmonary Disease [phone]

## 2023-04-07 ENCOUNTER — LAB (OUTPATIENT)
Dept: LAB | Facility: CLINIC | Age: 58
End: 2023-04-07

## 2023-04-07 DIAGNOSIS — D84.9 IMMUNOSUPPRESSED STATUS (H): ICD-10-CM

## 2023-04-07 DIAGNOSIS — E83.42 HYPOMAGNESEMIA: ICD-10-CM

## 2023-04-07 DIAGNOSIS — I10 PRIMARY HYPERTENSION: ICD-10-CM

## 2023-04-07 DIAGNOSIS — Z79.899 ENCOUNTER FOR LONG-TERM (CURRENT) USE OF HIGH-RISK MEDICATION: ICD-10-CM

## 2023-04-07 DIAGNOSIS — B44.9 ASPERGILLUS (H): ICD-10-CM

## 2023-04-07 DIAGNOSIS — Z94.2 S/P LUNG TRANSPLANT (H): ICD-10-CM

## 2023-04-07 PROCEDURE — 80197 ASSAY OF TACROLIMUS: CPT | Performed by: INTERNAL MEDICINE

## 2023-04-10 ENCOUNTER — TELEPHONE (OUTPATIENT)
Dept: TRANSPLANT | Facility: CLINIC | Age: 58
End: 2023-04-10
Payer: COMMERCIAL

## 2023-04-10 ENCOUNTER — TELEPHONE (OUTPATIENT)
Dept: ANTICOAGULATION | Facility: CLINIC | Age: 58
End: 2023-04-10
Payer: COMMERCIAL

## 2023-04-10 DIAGNOSIS — I48.91 ATRIAL FIBRILLATION, UNSPECIFIED TYPE (H): Primary | ICD-10-CM

## 2023-04-10 DIAGNOSIS — Z94.2 S/P LUNG TRANSPLANT (H): Chronic | ICD-10-CM

## 2023-04-10 DIAGNOSIS — B44.9 ASPERGILLUS (H): ICD-10-CM

## 2023-04-10 DIAGNOSIS — D84.9 IMMUNOSUPPRESSED STATUS (H): ICD-10-CM

## 2023-04-10 DIAGNOSIS — Z79.899 ENCOUNTER FOR LONG-TERM (CURRENT) USE OF HIGH-RISK MEDICATION: ICD-10-CM

## 2023-04-10 DIAGNOSIS — Z94.2 LUNG REPLACED BY TRANSPLANT (H): ICD-10-CM

## 2023-04-10 LAB
TACROLIMUS BLD-MCNC: 6 UG/L (ref 5–15)
TME LAST DOSE: NORMAL H
TME LAST DOSE: NORMAL H

## 2023-04-10 RX ORDER — ROSUVASTATIN CALCIUM 10 MG/1
10 TABLET, COATED ORAL DAILY
Qty: 30 TABLET | Refills: 11 | Status: SHIPPED | OUTPATIENT
Start: 2023-04-10 | End: 2024-04-08

## 2023-04-10 RX ORDER — VORICONAZOLE 50 MG/1
100 TABLET, FILM COATED ORAL 2 TIMES DAILY
Qty: 360 TABLET | Refills: 3 | Status: SHIPPED | OUTPATIENT
Start: 2023-04-10 | End: 2023-11-16

## 2023-04-10 RX ORDER — SULFAMETHOXAZOLE AND TRIMETHOPRIM 400; 80 MG/1; MG/1
1 TABLET ORAL DAILY
Qty: 30 TABLET | Refills: 11 | Status: SHIPPED | OUTPATIENT
Start: 2023-04-10 | End: 2024-04-08

## 2023-04-10 RX ORDER — PANTOPRAZOLE SODIUM 40 MG/1
40 TABLET, DELAYED RELEASE ORAL
Qty: 60 TABLET | Refills: 11 | Status: SHIPPED | OUTPATIENT
Start: 2023-04-10 | End: 2024-04-08

## 2023-04-10 RX ORDER — PREDNISONE 5 MG/1
TABLET ORAL
Qty: 135 TABLET | Refills: 3 | Status: SHIPPED | OUTPATIENT
Start: 2023-04-10 | End: 2024-05-28

## 2023-04-10 RX ORDER — VORICONAZOLE 200 MG/1
200 TABLET, FILM COATED ORAL 2 TIMES DAILY
Qty: 180 TABLET | Refills: 3 | Status: SHIPPED | OUTPATIENT
Start: 2023-04-10 | End: 2023-11-16

## 2023-04-10 RX ORDER — LEVOTHYROXINE SODIUM 25 UG/1
25 TABLET ORAL DAILY
Qty: 30 TABLET | Refills: 11 | Status: SHIPPED | OUTPATIENT
Start: 2023-04-10 | End: 2024-04-08

## 2023-04-10 NOTE — TELEPHONE ENCOUNTER
Pt requesting refill of the following medications to [facility]:   - bactrim  - synthroid   - 5mg prednisone  - crestor   - protonix   -voriconazole     Vori level from [facility] 1.8 on 4/8/23. Current dose 250mg BID.     Reviewed with Dr. Woods:   -increase vori to 300mg BID  -recheck tac level this Friday

## 2023-04-10 NOTE — TELEPHONE ENCOUNTER
ANTICOAGULATION  MANAGEMENT     Interacting Medication Review    Interacting medication(s): Sulfamethoxazole-Trimethoprim (Bactrim) and Voriconazole (Vfend) with warfarin. Voriconazole dose increased from 250 mg BID to 300 mg BID.     Duration: Long-term    Indication: Transplant/aspergillus    New medication?: Yes, interaction may increase INR and risk of bleeding. Closer INR monitoring recommended.        PLAN     Continue your current warfarin dose with next INR in 1 day - patient agreed to check INR tomorrow.      Telephone call with Bret who verbalizes understanding and agrees to plan    New recheck date updated on anticoagulation calendar     Plan made per [facility] anticoagulation protocol    Yue Webb, RN  Anticoagulation Clinic

## 2023-04-10 NOTE — LETTER
PHYSICIAN ORDERS      DATE & TIME ISSUED: April 10, 2023 11:23 AM  PATIENT NAME: Edson Thornton Jr.   : 1965     [facility] MR# [if applicable]: [id number]     DIAGNOSIS:  Lung Transplant  Z94.2    Please draw bmp, mag, cbc with platelets and tacrolimus level 23    Any questions please call: Alfred [phone]    Please fax these results to [phone].      .

## 2023-04-10 NOTE — LETTER
PHYSICIAN ORDERS      DATE & TIME ISSUED: April 10, 2023 11:25 AM  PATIENT NAME: Edson Thornton Jr.   : 1965     [facility] MR# [if applicable]: [id number]     DIAGNOSIS:  Lung Transplant  Z94.2    Please draw bmp, mag, cbc with platelets, tacrolimus level, and voriconazole level 23.    Any questions please call: Alfred [phone]    Please fax these results to [phone].      .

## 2023-04-10 NOTE — TELEPHONE ENCOUNTER
Abhay from [facility] wanting to confirm patient will be using  kits for tacrolimus level.

## 2023-04-11 ENCOUNTER — ANTICOAGULATION THERAPY VISIT (OUTPATIENT)
Dept: ANTICOAGULATION | Facility: CLINIC | Age: 58
End: 2023-04-11
Payer: COMMERCIAL

## 2023-04-11 DIAGNOSIS — I48.91 ATRIAL FIBRILLATION, UNSPECIFIED TYPE (H): Primary | ICD-10-CM

## 2023-04-11 DIAGNOSIS — Z79.899 ENCOUNTER FOR LONG-TERM (CURRENT) USE OF HIGH-RISK MEDICATION: ICD-10-CM

## 2023-04-11 LAB — INR (EXTERNAL): 3 (ref 0.9–1.1)

## 2023-04-11 NOTE — PROGRESS NOTES
ANTICOAGULATION MANAGEMENT     Edson Thornton Jr. 57 year old male is on warfarin with therapeutic INR result. (Goal INR 2.0-3.0)    Recent labs: (last 7 days)     04/11/23  0000   INR 3.0*       ASSESSMENT       Source(s): Chart Review       Warfarin doses taken: Reviewed in chart    Diet: No new diet changes identified    New illness, injury, or hospitalization: No    Medication/supplement changes: per chart review Voriconazole was increased to 300mg daily     Signs or symptoms of bleeding or clotting: No    Previous INR: Supratherapeutic    Additional findings: None         PLAN     Recommended plan for ongoing change(s) affecting INR     Dosing Instructions: decrease your warfarin dose (5.3% change) with next INR in 10 days       Summary  As of 4/11/2023    Full warfarin instructions:  2 mg every Mon, Wed, Fri; 3 mg all other days   Next INR check:  4/21/2023             Detailed voice message left for Bret with dosing instructions and follow up date.   Sent [employer] message with dosing and follow up instructions    Patient using outside facility for labs    Education provided:     Please call back if any changes to your diet, medications or how you've been taking warfarin    Contact [phone] with any changes, questions or concerns.     Plan made with [facility] Pharmacist Usha Choi, TIFFANY  Anticoagulation Clinic  4/11/2023    _______________________________________________________________________     Anticoagulation Episode Summary     Current INR goal:  2.0-3.0   TTR:  58.2 % (11.8 mo)   Target end date:  Indefinite   Send INR reminders to:  [facility]    Indications    Atrial fibrillation  unspecified type (H) [I48.91]  Encounter for long-term (current) use of high-risk medication [Z79.899]           Comments:           Anticoagulation Care Providers     Provider Role Specialty Phone number    Abiodun Woods MD Referring Pulmonary Disease [phone]

## 2023-04-14 ENCOUNTER — LAB (OUTPATIENT)
Dept: LAB | Facility: CLINIC | Age: 58
End: 2023-04-14

## 2023-04-14 DIAGNOSIS — Z94.2 LUNG REPLACED BY TRANSPLANT (H): Primary | ICD-10-CM

## 2023-04-14 PROCEDURE — 80197 ASSAY OF TACROLIMUS: CPT | Performed by: INTERNAL MEDICINE

## 2023-04-17 ENCOUNTER — ANTICOAGULATION THERAPY VISIT (OUTPATIENT)
Dept: ANTICOAGULATION | Facility: CLINIC | Age: 58
End: 2023-04-17
Payer: COMMERCIAL

## 2023-04-17 ENCOUNTER — TELEPHONE (OUTPATIENT)
Dept: TRANSPLANT | Facility: CLINIC | Age: 58
End: 2023-04-17
Payer: COMMERCIAL

## 2023-04-17 DIAGNOSIS — I48.91 ATRIAL FIBRILLATION, UNSPECIFIED TYPE (H): Primary | ICD-10-CM

## 2023-04-17 DIAGNOSIS — Z94.2 LUNG REPLACED BY TRANSPLANT (H): ICD-10-CM

## 2023-04-17 DIAGNOSIS — D84.9 IMMUNOSUPPRESSED STATUS (H): ICD-10-CM

## 2023-04-17 DIAGNOSIS — Z79.899 ENCOUNTER FOR LONG-TERM (CURRENT) USE OF HIGH-RISK MEDICATION: ICD-10-CM

## 2023-04-17 LAB
TACROLIMUS BLD-MCNC: 7.7 UG/L (ref 5–15)
TME LAST DOSE: NORMAL H
TME LAST DOSE: NORMAL H

## 2023-04-17 NOTE — PROGRESS NOTES
ANTICOAGULATION MANAGEMENT     Edson Thornton Jr. 57 year old male is on warfarin with therapeutic INR result. (Goal INR 2.0-3.0)    Recent labs: (last 7 days)     04/14/23  0800   INR 3.0*       ASSESSMENT       Source(s): Patient/Caregiver Call       Warfarin doses taken: Warfarin taken as instructed    Diet: No new diet changes identified    Medication/supplement changes: Continues on Voriconazole     New illness, injury, or hospitalization: No    Signs or symptoms of bleeding or clotting: No    Previous INR: Therapeutic last visit; previously outside of goal range    Additional findings: None         PLAN     Recommended plan for ongoing change(s) affecting INR     Dosing Instructions: decrease your warfarin dose (5.6% change) with next INR in 2 weeks       Summary  As of 4/17/2023    Full warfarin instructions:  3 mg every Sun, Tue, Thu; 2 mg all other days   Next INR check:  4/28/2023             Telephone call with Bret who verbalizes understanding and agrees to plan and who agrees to plan and repeated back plan correctly  Sent [employer] message with dosing and follow up instructions    Patient using outside facility for labs    Education provided:     None required    Plan made with [facility] Pharmacist Usha Choi, RN  Anticoagulation Clinic  4/17/2023    _______________________________________________________________________     Anticoagulation Episode Summary     Current INR goal:  2.0-3.0   TTR:  58.9 % (11.7 mo)   Target end date:  Indefinite   Send INR reminders to:  [facility]    Indications    Atrial fibrillation  unspecified type (H) [I48.91]  Encounter for long-term (current) use of high-risk medication [Z79.899]           Comments:           Anticoagulation Care Providers     Provider Role Specialty Phone number    Abiodun Woods MD Referring Pulmonary Disease [phone]

## 2023-04-18 NOTE — RESULT ENCOUNTER NOTE
Tacrolimus level 7.7 at 12 hours, on 4/14/23.  Goal 8-10.   Current dose 0.4 mg in AM, 0.5 mg in PM    No change in dose, patient close to goal range.  [employer] message sent

## 2023-04-21 ENCOUNTER — LAB (OUTPATIENT)
Dept: LAB | Facility: CLINIC | Age: 58
End: 2023-04-21

## 2023-04-21 DIAGNOSIS — Z94.2 LUNG REPLACED BY TRANSPLANT (H): Primary | ICD-10-CM

## 2023-04-21 PROCEDURE — 80197 ASSAY OF TACROLIMUS: CPT | Performed by: INTERNAL MEDICINE

## 2023-04-24 ENCOUNTER — ANTICOAGULATION THERAPY VISIT (OUTPATIENT)
Dept: ANTICOAGULATION | Facility: CLINIC | Age: 58
End: 2023-04-24
Payer: COMMERCIAL

## 2023-04-24 DIAGNOSIS — Z79.899 ENCOUNTER FOR LONG-TERM (CURRENT) USE OF HIGH-RISK MEDICATION: ICD-10-CM

## 2023-04-24 DIAGNOSIS — I10 HYPERTENSION: ICD-10-CM

## 2023-04-24 DIAGNOSIS — I48.91 ATRIAL FIBRILLATION, UNSPECIFIED TYPE (H): Primary | ICD-10-CM

## 2023-04-24 LAB
TACROLIMUS BLD-MCNC: 7.8 UG/L (ref 5–15)
TME LAST DOSE: NORMAL H
TME LAST DOSE: NORMAL H

## 2023-04-24 RX ORDER — AMLODIPINE BESYLATE 10 MG/1
10 TABLET ORAL AT BEDTIME
Qty: 30 TABLET | Refills: 11 | Status: SHIPPED | OUTPATIENT
Start: 2023-04-24 | End: 2023-05-10

## 2023-04-24 NOTE — PROGRESS NOTES
ANTICOAGULATION MANAGEMENT     Edson Thornton Jr. 57 year old male is on warfarin with therapeutic INR result. (Goal INR 2.0-3.0)    Recent labs: (last 7 days)     04/21/23  0814   INR 2.5*       ASSESSMENT       Source(s): Chart Review and Patient/Caregiver Call       Warfarin doses taken: Warfarin taken as instructed    Diet: No new diet changes identified    Medication/supplement changes: None noted    New illness, injury, or hospitalization: No    Signs or symptoms of bleeding or clotting: No    Previous result: Therapeutic last 2(+) visits    Additional findings: None         PLAN     Recommended plan for no diet, medication or health factor changes affecting INR     Dosing Instructions: Continue your current warfarin dose with next INR in 1 week       Summary  As of 4/24/2023    Full warfarin instructions:  3 mg every Sun, Tue, Thu; 2 mg all other days   Next INR check:  4/28/2023             Telephone call with Bret who verbalizes understanding and agrees to plan    Patient using outside facility for labs    Education provided:     Goal range and lab monitoring: goal range and significance of current result    Plan made per [facility] anticoagulation protocol    Dionne Lopez RN  Anticoagulation Clinic  4/24/2023    _______________________________________________________________________     Anticoagulation Episode Summary     Current INR goal:  2.0-3.0   TTR:  60.8 % (11.7 mo)   Target end date:  Indefinite   Send INR reminders to:  [facility]    Indications    Atrial fibrillation  unspecified type (H) [I48.91]  Encounter for long-term (current) use of high-risk medication [Z79.899]           Comments:           Anticoagulation Care Providers     Provider Role Specialty Phone number    Abiodun Woods MD Referring Pulmonary Disease [phone]

## 2023-04-24 NOTE — PROGRESS NOTES
Edson Thornton Jr. is a 57 year old male who is being evaluated via a billable video visit.        [facility]    Consulting provider: Abiodun Woods MD  [address] [place] [id number]      Reason for consultation: Osteoporosis      Assessment:  Management with s/p lung transplant with osteoporosis.  T score of -2.7 at the lumbar spine (L2-4) with risk factor of s/p lung transplant, chronic steroid treatment, rheumatoid arthritis, hypovitaminosis D, possibly hypogonadism, comorbidity of hyperparathyroidism, diabetes, hypothyroidism, history of stroke    Increased parathyroid hormone   parathyroid hormone was increased in the setting of variable kidney function and just recovered from hypovitaminosis D likely being secondary etiology.  Calcium levels only measured mildly elevated once several months prior.    Osteoporosis  Patient with osteoporosis.  T score of -2.7 s/p renal transplant.  Recommend to start Reclast infusion 5 mg yearly for 5 years  Yearly follow-up with me  Repeat DEXA scan every 2 years while on treatment  Will evaluate calcium intake with 24-hour urine calcium and recheck vitamin D level      Plan:   Lab today PTH intact calcium phosphorus creatinine albumin 25-hydroxy vitamin D  At home 24 urine calcium and creatinine, patient will bring that to his local lab in [place]      The  Following reports and notes were reviewed: recent DXA scan, labs    Billie Huntley MD  Endocrinology and Diabetes  Telephone contact:  [facility] [place] [phone]  [facility] [phone]        HPI:   Edson Thornton Jr. is a 57 year old male    Most recent DXA scan 11/2022 T-score -2.7 L2-4  Prior fractures no  Height loss no  Prior osteoporosis medications no   Estrogen treatment na      Falls in the last year 2-3 times trippig on stuff outsdie    Dietary Calcium cheese some nuts ome bean green  Dietary  Protein intake   Calcium supplements Calcium once [facility]  Vitamin D supplements .  Alcohol consumption no  Smoking no    Exercise walking, some yard work.    FH of osteoporosis or fractures not known possibly mother    High risk medications Prednisone lung transplant Pred 5 mg in am 2.5 mg om    Gastric or bariatric surgery clip  Stroke or MI in last 12 months strokes  Kidney stones no    Dental status no, teeth    Current Problem List:   Patient Active Problem List   Diagnosis     S/P lung transplant (H)     BRENNAN (acute kidney injury) (H)     Shock liver     Ischemic stroke (H)     Atrial fibrillation, unspecified type (H)     Encounter for long-term (current) use of high-risk medication     Immunosuppressed status (H)     Primary hypertension     Pulmonary air trapping     Hypomagnesemia     Bronchial hemorrhage     Pulmonary embolism (H)       Past Medical and Past Surgical History:  Past Medical History:   Diagnosis Date     BRENNAN (acute kidney injury) (H) 10/17/2021     Anxiety      Depression      HLD (hyperlipidemia)      HTN (hypertension)      Hypothyroidism      ILD (interstitial lung disease) (H)      Infection due to 2019 novel coronavirus 03/2023     SALTY on CPAP      Oxygen dependent     BL 4L since ~6/2021     Primary hypertension 02/28/2022     Rheumatoid arthritis (H)     signs ~5/2020, dx 5/2021     S/P lung transplant (H) 10/16/2021     Shock liver 10/17/2021     Steroid-induced hyperglycemia      Traction bronchiectasis (H)        Past Surgical History:   Procedure Laterality Date     BRONCHOSCOPY (RIGID OR FLEXIBLE), DIAGNOSTIC N/A 1/26/2022    Procedure: BRONCHOSCOPY, WITH BRONCHOALVEOLAR LAVAGE AND BIOPSY;  Surgeon: Perlman, David Morris, MD;  Location: [facility]     BRONCHOSCOPY (RIGID OR FLEXIBLE), DIAGNOSTIC N/A 4/19/2022    Procedure: BRONCHOSCOPY, WITH BRONCHOALVEOLAR LAVAGE AND BIOPSY;  Surgeon: Sherine Merrill MD;  Location: [facility]     BRONCHOSCOPY (RIGID OR FLEXIBLE), DIAGNOSTIC N/A 6/21/2022     Procedure: BRONCHOSCOPY, WITH BRONCHOALVEOLAR LAVAGE AND BIOPSY;  Surgeon: Aneesh Rubio MD;  Location:  GI     COLONOSCOPY W/ BIOPSIES AND POLYPECTOMY  07/21/2020     CV CORONARY ANGIOGRAM N/A 09/08/2021    Procedure: Coronary Angiogram with possible intervention;  Surgeon: Jovon Bullock MD;  Location:  HEART CARDIAC CATH LAB     CV RIGHT HEART CATH MEASUREMENTS RECORDED N/A 09/08/2021    Procedure: Right Heart Cath;  Surgeon: Jovon Bullock MD;  Location:  HEART CARD[facility]AC CATH LAB     ESOPHAGOSCOPY, GASTROSCOPY, DUODENOSCOPY (EGD), COMBINED N/A 10/23/2021    Procedure: ESOPHAGOGASTRODUODENOSCOPY (EGD);  Surgeon: Miquel Pisano MD;  Location:  GI     ESOPHAGOSCOPY, GASTROSCOPY, DUODENOSCOPY (EGD), COMBINED N/A 11/02/2021    Procedure: ESOPHAGOGASTRODUODENOSCOPY (EGD);  Surgeon: Daniel Ortiz MD;  Location:  GI     ESOPHAGOSCOPY, GASTROSCOPY, DUODENOSCOPY (EGD), COMBINED N/A 11/5/2021    Procedure: ESOPHAGOGASTRODUODENOSCOPY (EGD);  Surgeon: Ronnell Hernandez MD;  Location:  GI     EXTRACTION(S) DENTAL N/A 09/22/2021    Procedure: EXTRACTION tooth #19;  Surgeon: Deepak Tobin DDS;  [facility]     HERNIA REPAIR       IR CHEST TUBE PLACEMENT NON-TUNNELLED LEFT  11/03/2021     PICC TRIPLE LUMEN PLACEMENT Left 11/04/2021    5FR TL PICC. Right non occlusive thrombus subclavian vein.     REPLACE GASTROJEJUNOSTOMY TUBE, PERCUTANEOUS N/A 11/9/2021    Procedure: REPLACEMENT, GASTROJEJUNOSTOMY TUBE, PERCUTANEOUS;  Surgeon: Hernando Rodriguez MD;  [facility]     right acl       TRACHEOSTOMY PERCUTANEOUS N/A 10/29/2021    Procedure: Percutaneous Tracheostomy,;  Surgeon: Celine Jenkins MD;  Location: [facility]     TRANSPLANT LUNG RECIPIENT SINGLE X2 Bilateral 10/16/2021    Procedure: TRANSPLANT, LUNG, RECIPIENT, BILATERAL, Bronchoscopy, on-pump perfusion, bilateral clamshell sternotomy;  Surgeon: Yanick Corral MD;  Location: [facility]     XR  ACROMIOCLAVICULAR JOINT BILATERAL         Medications:   Current Outpatient Medications   Medication Sig Dispense Refill     acetaminophen (TYLENOL) 325 MG tablet 3 tablets (975 mg) by Oral or Feeding Tube route every 8 hours as needed for mild pain 100 tablet 11     amLODIPine (NORVASC) 10 MG tablet Take 1 tablet (10 mg) by mouth At Bedtime 30 tablet 11     calcium carbonate 600 mg-vitamin D 400 units 600-400 MG-UNIT per tablet 1 tablet by Oral or Feeding Tube route daily 30 tablet 11     diclofenac (VOLTAREN) 1 % topical gel Apply 4 g topically 4 times daily Both hands 150 g 11     FLUoxetine (PROZAC) 20 MG capsule Take 20 mg by mouth daily       fluticasone (FLONASE) 50 MCG/ACT nasal spray Spray 1 spray into both nostrils daily 16 g 11     fluticasone-salmeterol (ADVAIR) 500-50 MCG/ACT inhaler Inhale 1 puff into the lungs every 12 hours 60 each 11     hydroxychloroquine (PLAQUENIL) 200 MG tablet Take 200 mg by mouth 2 times daily       insulin aspart (NOVOLOG PEN) 100 UNIT/ML pen Take 1-3 units TID if BS over 200. Max daily dose is 12 15 mL 11     insulin pen needle (32G X 4 MM) 32G X 4 MM miscellaneous Use 4 pen needles daily or as directed. 120 each 11     lamoTRIgine (LAMICTAL) 25 MG tablet Take 50 mg by mouth daily       levalbuterol (XOPENEX HFA) 45 MCG/ACT inhaler Inhale 2 puffs into the lungs 3 times daily 3 g 3     levothyroxine (SYNTHROID/LEVOTHROID) 25 MCG tablet Take 1 tablet (25 mcg) by mouth daily 30 tablet 11     Magnesium Glycinate POWD 200 mg 3 times daily Take 2 tablets (100mg each) three times daily 18 g 11     Melatonin 10 MG TABS tablet Take 1 tablet (10 mg) by mouth nightly as needed for sleep 30 tablet 3     mirtazapine (REMERON) 7.5 MG tablet Take 7.5 mg by mouth At Bedtime       multivitamin w/minerals (THERA-VIT-M) tablet Take 1 tablet by mouth daily 30 tablet 11     MYFORTIC (BRAND) 360 MG EC tablet Take 360 mg by mouth 2 times daily       ondansetron (ZOFRAN-ODT) 4 MG ODT tab Take 1  tablet (4 mg) by mouth every 6 hours as needed for nausea 30 tablet 3     pantoprazole (PROTONIX) 40 MG EC tablet Take 1 tablet (40 mg) by mouth 2 times daily (before meals) 60 tablet 11     predniSONE (DELTASONE) 2.5 MG tablet Take 1 tablet (2.5 mg) by mouth every evening Total dose: 5mg in AM and 2.5 mg in PM 30 tablet 11     predniSONE (DELTASONE) 5 MG tablet Take 1 tablet (5 mg) by mouth every morning AND 0.5 tablets (2.5 mg) every evening. 135 tablet 3     propranolol (INDERAL) 20 MG tablet Take 2 tablets (40 mg) by mouth 2 times daily 120 tablet 11     rOPINIRole (REQUIP) 0.25 MG tablet Take 0.25 mg by mouth At Bedtime       rosuvastatin (CRESTOR) 10 MG tablet Take 1 tablet (10 mg) by mouth daily 30 tablet 11     senna-docusate (SENOKOT-S/PERICOLACE) 8.6-50 MG tablet Take 2 tablets by mouth 2 times daily as needed for constipation 120 tablet 11     sulfamethoxazole-trimethoprim (BACTRIM) 400-80 MG tablet Take 1 tablet by mouth daily 30 tablet 11     tacrolimus (GENERIC EQUIVALENT) 1 mg/mL suspension TOTAL DOSE: 0.4 mg in AM and 0.5 mg in PM 90 mL 3     tamsulosin (FLOMAX) 0.4 MG capsule Take 1 capsule (0.4 mg) by mouth daily       voriconazole (VFEND) 200 MG tablet Take 1 tablet (200 mg) by mouth 2 times daily Total dose: 300mg two times daily 180 tablet 3     voriconazole (VFEND) 50 MG tablet Take 2 tablets (100 mg) by mouth 2 times daily Total dose: 300mg two times daily. 360 tablet 3     warfarin ANTICOAGULANT (COUMADIN) 1 MG tablet Take 0.5 tablets (0.5 mg) by mouth daily Take 1/2 tablet daily as directed by Anticoagulation Clinic. 90 tablet 1       Allergies:   No Known Allergies    Social History     Tobacco Use     Smoking status: Never     Smokeless tobacco: Never   Vaping Use     Vaping status: Not on file   Substance Use Topics     Alcohol use: Never       Family History   Problem Relation Age of Onset     Diabetes Type 1 Mother      Heart Disease Mother      Chronic Obstructive Pulmonary Disease  Mother      Rheumatoid Arthritis Father      Emphysema Paternal Grandfather        Physical Examination:  There were no vitals taken for this visit.  There is no height or weight on file to calculate BMI.    Wt Readings from Last 6 Encounters:   03/28/23 69.9 kg (154 lb)   03/14/23 69.6 kg (153 lb 6.4 oz)   12/20/22 73 kg (161 lb)   09/12/22 74.8 kg (165 lb)   06/29/22 72.3 kg (159 lb 8 oz)   06/22/22 69.8 kg (153 lb 14.4 oz)       General: Well appearing man in no distress, up and go gppd  Eyes: EOMI. Sclerae and conjunctivae are clear.   HENT: No thyromegaly or mass.    Lymphatic: No cervical or supraclavicular lymphadenopathy.  Cardiovascular: RRR, with normal S1+S2 and no murmurs.   Skin: No rash or lesions. No abdominal striae.    Extremities: No peripheral edema.   Neurologic: No tremor with hands outstretched. 2+ patellar reflexes.       Labs and Studies:   Lab Results   Component Value Date    ERIK 10.0 03/28/2023    ERIK 9.3 03/14/2023    ERIK 8.8 03/13/2023    ERIK 8.8 03/12/2023    ALBUMIN 4.5 03/28/2023    ALT 25 03/28/2023    PHOS 4.3 03/11/2023    PTHI 72 (H) 11/14/2022    AST 32 03/28/2023    BILITOTAL 0.2 03/28/2023    CR 1.61 (H) 03/28/2023    CR 1.31 (H) 03/14/2023    CR 1.27 (H) 03/13/2023     03/28/2023    TSH 3.17 11/19/2021    ALKPHOS 97 03/28/2023    TESTOSTTOTAL 232 (L) 11/14/2022    HGB 11.9 (L) 03/28/2023         Lab Results   Component Value Date     03/28/2023    CHLORIDE 108 04/21/2023    CO2 24 03/28/2023     (H) 03/28/2023    CR 1.61 (H) 03/28/2023    CR 1.31 (H) 03/14/2023    CR 1.27 (H) 03/13/2023    CR 1.31 (H) 03/12/2023    CR 1.34 (H) 03/11/2023    ERIK 10.0 03/28/2023    MAG 2.4 (H) 03/28/2023    ALBUMIN 4.5 03/28/2023    ALKPHOS 97 03/28/2023    LDL 69 11/14/2022    HDL 46 11/14/2022    TRIG 211 (H) 11/14/2022    TSH 3.17 11/19/2021    TSH 7.90 (H) 10/29/2021    TSH 0.11 (L) 09/07/2021     No results found for: MICROL  Lab Results   Component Value Date    A1C 6.1  (H) 11/14/2022    A1C 5.3 11/15/2021    A1C 6.6 (H) 09/07/2021    A1C 6.5 (H) 09/05/2021       Lab Results   Component Value Date    HGB 11.9 (L) 03/28/2023       Results for orders placed in visit on 11/14/22    Dexa hip/pelvis/spine*    Narrative  Images from the original result were not included.    [address] [address]  Phone: [phone]   Fax: [phone]        Impression  Based on BMD diagnosis is consistent with osteoporosis based on WHO criteria Ref. 1    Results  Lumbar spine  T-score -2.7 (L2-4), BMD is 0.918 g/cm2    Left femoral neck  T-score -2.5    Right femoral neck  T-score -2.1    Left total femur  T-score -2.0 , BMD is 0.813 g/cm2    Right total femur  T-score -2.1, BMD is 0.797 g/cm2    Interval change  No prior study available for comparison.    Fracture risk  Fracture risk calculation is not indicated. Ref.4    Repeat  Recommend repeat DXA in 2 years.    The above are general recommendations for follow-up testing and intervals between BMD testing should be determined according to each patient's clinical status.      Principal result :  JANET Mortensen MD, [facility]  Division of Endocrinology and Diabetes  Department of Medicine    Technical quality  Satisfactory.  Abnormal vertebrae may be excluded from analysis if there is more than a 1.0 T-score difference between the vertebrae in question and adjacent vertebrae. Note the wide range in BMD values and T-scores within the lumbar spine. In the circumstances, the lumbar spine is represented by L2-L4.      References:  Ref. 1. WHO categories:  T-score > -1.0  = normal             .  T-score -1.0 to -2.5 = low bone density  T-score < -2.5 = osteoporosis        .    Ref. 2. 2015 [employer] official position statements:  www.iscd.org.    Ref. 3.  Today's examination is compared to the technically similar prior study of the total hip and femur if available. Only changes deemed  likely to be significant based on historical data are reported.  According to the [employer] position statements, total hip rather than femoral neck regions are to be compared because larger areas give better precision.  LSC = least significant changes at the [facility] (historical data)  AP spine =  0.032 g/cm2  (6/26/2007)  Left hip = 0.029 g/cm2  (6/15/2007)  Right hip = 0.018 g/cm2  (6/15/2007)  Left mid radius = 0.043 g/cm2  (6/26/2007)  Please note that the differential diagnosis of increase in bone density at the lumbar spine includes improvement due to pharmacotherapy vs inter-current progression of spine degeneration or fracture.    Ref. 4 Fracture risk is calculated in patients aged 40 to 90 years old with low bone density not on osteoporosis treatment. A 10 year fracture risk of 3% and higher for hip fracture and 20% and higher for major osteoporotic fracture is considered higher than acceptable risk and might be an indication for medical treatment.    Ref. 5.  By definition, osteoporosis may be diagnosed in the presence or with the history of a low trauma or fragility fracture.  Fragility and low trauma fracture is defined as a fracture resulting from the force of a fall from a standing height or less or a bone that breaks under conditions that would not cause a normal bone to break.    Ref. 6. [facility] Physician's Guideline Website address:  www.nof.org.    No results found for this or any previous visit.

## 2023-04-25 ENCOUNTER — PRE VISIT (OUTPATIENT)
Dept: ENDOCRINOLOGY | Facility: CLINIC | Age: 58
End: 2023-04-25

## 2023-04-25 ENCOUNTER — TELEPHONE (OUTPATIENT)
Dept: ENDOCRINOLOGY | Facility: CLINIC | Age: 58
End: 2023-04-25

## 2023-04-25 ENCOUNTER — OFFICE VISIT (OUTPATIENT)
Dept: ENDOCRINOLOGY | Facility: CLINIC | Age: 58
End: 2023-04-25
Attending: INTERNAL MEDICINE
Payer: COMMERCIAL

## 2023-04-25 ENCOUNTER — LAB (OUTPATIENT)
Dept: LAB | Facility: CLINIC | Age: 58
End: 2023-04-25
Payer: COMMERCIAL

## 2023-04-25 VITALS
HEART RATE: 67 BPM | WEIGHT: 155 LBS | HEIGHT: 64 IN | BODY MASS INDEX: 26.46 KG/M2 | OXYGEN SATURATION: 97 % | SYSTOLIC BLOOD PRESSURE: 116 MMHG | DIASTOLIC BLOOD PRESSURE: 72 MMHG

## 2023-04-25 DIAGNOSIS — Z94.2 S/P LUNG TRANSPLANT (H): Primary | Chronic | ICD-10-CM

## 2023-04-25 DIAGNOSIS — D84.9 IMMUNOSUPPRESSED STATUS (H): ICD-10-CM

## 2023-04-25 DIAGNOSIS — I10 PRIMARY HYPERTENSION: ICD-10-CM

## 2023-04-25 DIAGNOSIS — Z79.899 ENCOUNTER FOR LONG-TERM (CURRENT) USE OF HIGH-RISK MEDICATION: ICD-10-CM

## 2023-04-25 DIAGNOSIS — E83.42 HYPOMAGNESEMIA: ICD-10-CM

## 2023-04-25 DIAGNOSIS — M81.8 OTHER OSTEOPOROSIS WITHOUT CURRENT PATHOLOGICAL FRACTURE: ICD-10-CM

## 2023-04-25 DIAGNOSIS — E03.9 ACQUIRED HYPOTHYROIDISM: ICD-10-CM

## 2023-04-25 DIAGNOSIS — Z94.2 S/P LUNG TRANSPLANT (H): ICD-10-CM

## 2023-04-25 DIAGNOSIS — M81.0 OSTEOPOROSIS: ICD-10-CM

## 2023-04-25 DIAGNOSIS — Z79.52 CURRENT CHRONIC USE OF SYSTEMIC STEROIDS: ICD-10-CM

## 2023-04-25 LAB
ALBUMIN SERPL BCG-MCNC: 4.3 G/DL (ref 3.5–5.2)
CALCIUM SERPL-MCNC: 10.1 MG/DL (ref 8.6–10)
CREAT SERPL-MCNC: 1.2 MG/DL (ref 0.67–1.17)
GFR SERPL CREATININE-BSD FRML MDRD: 71 ML/MIN/1.73M2
PHOSPHATE SERPL-MCNC: 2.9 MG/DL (ref 2.5–4.5)
TSH SERPL DL<=0.005 MIU/L-ACNC: 3.05 UIU/ML (ref 0.3–4.2)

## 2023-04-25 PROCEDURE — 84443 ASSAY THYROID STIM HORMONE: CPT | Performed by: PATHOLOGY

## 2023-04-25 PROCEDURE — 82040 ASSAY OF SERUM ALBUMIN: CPT | Performed by: PATHOLOGY

## 2023-04-25 PROCEDURE — 82310 ASSAY OF CALCIUM: CPT | Performed by: PATHOLOGY

## 2023-04-25 PROCEDURE — 36415 COLL VENOUS BLD VENIPUNCTURE: CPT | Performed by: PATHOLOGY

## 2023-04-25 PROCEDURE — 86833 HLA CLASS II HIGH DEFIN QUAL: CPT | Performed by: PATHOLOGY

## 2023-04-25 PROCEDURE — 84100 ASSAY OF PHOSPHORUS: CPT | Performed by: PATHOLOGY

## 2023-04-25 PROCEDURE — 86832 HLA CLASS I HIGH DEFIN QUAL: CPT | Performed by: PATHOLOGY

## 2023-04-25 PROCEDURE — 87799 DETECT AGENT NOS DNA QUANT: CPT | Mod: 90 | Performed by: PATHOLOGY

## 2023-04-25 PROCEDURE — 99000 SPECIMEN HANDLING OFFICE-LAB: CPT | Performed by: PATHOLOGY

## 2023-04-25 PROCEDURE — 82565 ASSAY OF CREATININE: CPT | Performed by: PATHOLOGY

## 2023-04-25 PROCEDURE — 82306 VITAMIN D 25 HYDROXY: CPT | Mod: 90 | Performed by: PATHOLOGY

## 2023-04-25 PROCEDURE — 99204 OFFICE O/P NEW MOD 45 MIN: CPT | Performed by: INTERNAL MEDICINE

## 2023-04-25 RX ORDER — METHYLPREDNISOLONE SODIUM SUCCINATE 125 MG/2ML
125 INJECTION, POWDER, LYOPHILIZED, FOR SOLUTION INTRAMUSCULAR; INTRAVENOUS
Status: CANCELLED
Start: 2023-06-06

## 2023-04-25 RX ORDER — DIPHENHYDRAMINE HYDROCHLORIDE 50 MG/ML
50 INJECTION INTRAMUSCULAR; INTRAVENOUS
Status: CANCELLED
Start: 2023-06-06

## 2023-04-25 RX ORDER — ZOLEDRONIC ACID 5 MG/100ML
5 INJECTION, SOLUTION INTRAVENOUS ONCE
Status: CANCELLED
Start: 2023-06-06

## 2023-04-25 RX ORDER — ALBUTEROL SULFATE 0.83 MG/ML
2.5 SOLUTION RESPIRATORY (INHALATION)
Status: CANCELLED | OUTPATIENT
Start: 2023-06-06

## 2023-04-25 RX ORDER — EPINEPHRINE 1 MG/ML
0.3 INJECTION, SOLUTION, CONCENTRATE INTRAVENOUS EVERY 5 MIN PRN
Status: CANCELLED | OUTPATIENT
Start: 2023-06-06

## 2023-04-25 RX ORDER — HEPARIN SODIUM,PORCINE 10 UNIT/ML
5 VIAL (ML) INTRAVENOUS
Status: CANCELLED | OUTPATIENT
Start: 2023-06-06

## 2023-04-25 RX ORDER — HEPARIN SODIUM (PORCINE) LOCK FLUSH IV SOLN 100 UNIT/ML 100 UNIT/ML
5 SOLUTION INTRAVENOUS
Status: CANCELLED | OUTPATIENT
Start: 2023-06-06

## 2023-04-25 RX ORDER — ALBUTEROL SULFATE 90 UG/1
1-2 AEROSOL, METERED RESPIRATORY (INHALATION)
Status: CANCELLED
Start: 2023-06-06

## 2023-04-25 ASSESSMENT — PAIN SCALES - GENERAL: PAINLEVEL: NO PAIN (0)

## 2023-04-25 NOTE — TELEPHONE ENCOUNTER
Faxed patient's 24 hour urine lab orders to [facility] in [place] at [phone].  Angela Rowland

## 2023-04-25 NOTE — LETTER
4/25/2023       RE: Edson Thornton Jr.  [address]  [place] [address]     Dear Colleague,    Thank you for referring your patient, Edson Thornton Jr., to the [facility] ENDOCRINOLOGY [facility] [place] at [facility]. Please see a copy of my visit note below.      Edson Thornton Jr. is a 57 year old male who is being evaluated via a billable video visit.        Endocrinology and Diabetes Clinic    Consulting provider: Abiodun Woods MD  [address] [place] [id number]      Reason for consultation: Osteoporosis      Assessment:  Management with s/p lung transplant with osteoporosis.  T score of -2.7 at the lumbar spine (L2-4) with risk factor of s/p lung transplant, chronic steroid treatment, rheumatoid arthritis, hypovitaminosis D, possibly hypogonadism, comorbidity of hyperparathyroidism, diabetes, hypothyroidism, history of stroke    Increased parathyroid hormone   parathyroid hormone was increased in the setting of variable kidney function and just recovered from hypovitaminosis D likely being secondary etiology.  Calcium levels only measured mildly elevated once several months prior.    Osteoporosis  Patient with osteoporosis.  T score of -2.7 s/p renal transplant.  Recommend to start Reclast infusion 5 mg yearly for 5 years  Yearly follow-up with me  Repeat DEXA scan every 2 years while on treatment  Will evaluate calcium intake with 24-hour urine calcium and recheck vitamin D level      Plan:   Lab today PTH intact calcium phosphorus creatinine albumin 25-hydroxy vitamin D  At home 24 urine calcium and creatinine, patient will bring that to his local lab in [place]      The  Following reports and notes were reviewed: recent DXA scan, labs    Billie Huntley MD  Endocrinology and Diabetes  Telephone contact:  [facility] [place] [phone]  [facility]  [phone]        HPI:   Edson Thornton Jr. is a 57 year old male    Most recent DXA scan 11/2022 T-score -2.7 L2-4  Prior fractures no  Height loss no  Prior osteoporosis medications no   Estrogen treatment na      Falls in the last year 2-3 times trippig on stuff outsdie    Dietary Calcium cheese some nuts ome bean green  Dietary Protein intake   Calcium supplements Calcium once MVM  Vitamin D supplements .  Alcohol consumption no  Smoking no    Exercise walking, some yard work.    FH of osteoporosis or fractures not known possibly mother    High risk medications Prednisone lung transplant Pred 5 mg in am 2.5 mg om    Gastric or bariatric surgery clip  Stroke or MI in last 12 months strokes  Kidney stones no    Dental status no, teeth    Current Problem List:   Patient Active Problem List   Diagnosis    S/P lung transplant (H)    BRENNAN (acute kidney injury) (H)    Shock liver    Ischemic stroke (H)    Atrial fibrillation, unspecified type (H)    Encounter for long-term (current) use of high-risk medication    Immunosuppressed status (H)    Primary hypertension    Pulmonary air trapping    Hypomagnesemia    Bronchial hemorrhage    Pulmonary embolism (H)       Past Medical and Past Surgical History:  Past Medical History:   Diagnosis Date    BRENNAN (acute kidney injury) (H) 10/17/2021    Anxiety     Depression     HLD (hyperlipidemia)     HTN (hypertension)     Hypothyroidism     ILD (interstitial lung disease) (H)     Infection due to 2019 novel coronavirus 03/2023    SALTY on CPAP     Oxygen dependent     BL 4L since ~6/2021    Primary hypertension 02/28/2022    Rheumatoid arthritis (H)     signs ~5/2020, dx 5/2021    S/P lung transplant (H) 10/16/2021    Shock liver 10/17/2021    Steroid-induced hyperglycemia     Traction bronchiectasis (H)        Past Surgical History:   Procedure Laterality Date    BRONCHOSCOPY (RIGID OR FLEXIBLE), DIAGNOSTIC N/A 1/26/2022    Procedure: BRONCHOSCOPY, WITH BRONCHOALVEOLAR LAVAGE AND  BIOPSY;  Surgeon: Perlman, David Morris, MD;  Location:  GI    BRONCHOSCOPY (RIGID OR FLEXIBLE), DIAGNOSTIC N/A 4/19/2022    Procedure: BRONCHOSCOPY, WITH BRONCHOALVEOLAR LAVAGE AND BIOPSY;  Surgeon: Sherine Merrill MD;  Location:  GI    BRONCHOSCOPY (RIGID OR FLEXIBLE), DIAGNOSTIC N/A 6/21/2022    Procedure: BRONCHOSCOPY, WITH BRONCHOALVEOLAR LAVAGE AND BIOPSY;  Surgeon: Aneesh Rubio MD;  Location:  GI    COLONOSCOPY W/ BIOPSIES AND POLYPECTOMY  07/21/2020    CV CORONARY ANGIOGRAM N/A 09/08/2021    Procedure: Coronary Angiogram with possible intervention;  Surgeon: Jovon Bullock MD;  Location:  HEART CARDIAC CATH LAB    CV RIGHT HEART CATH MEASUREMENTS RECORDED N/A 09/08/2021    Procedure: Right Heart Cath;  Surgeon: Jovon Bullock MD;  Location:  HEART CARDIAC CATH LAB    ESOPHAGOSCOPY, GASTROSCOPY, DUODENOSCOPY (EGD), COMBINED N/A 10/23/2021    Procedure: ESOPHAGOGASTRODUODENOSCOPY (EGD);  Surgeon: Miquel Pisano MD;  Location:  GI    ESOPHAGOSCOPY, GASTROSCOPY, DUODENOSCOPY (EGD), COMBINED N/A 11/02/2021    Procedure: ESOPHAGOGASTRODUODENOSCOPY (EGD);  Surgeon: Daniel Ortiz MD;  Location:  GI    ESOPHAGOSCOPY, GASTROSCOPY, DUODENOSCOPY (EGD), COMBINED N/A 11/5/2021    Procedure: ESOPHAGOGASTRODUODENOSCOPY (EGD);  Surgeon: Ronnell Hernandez MD;  Location:  GI    EXTRACTION(S) DENTAL N/A 09/22/2021    Procedure: EXTRACTION tooth #19;  Surgeon: Deepak Tobin DDS;  Location:  OR    HERNIA REPAIR      IR CHEST TUBE PLACEMENT NON-TUNNELLED LEFT  11/03/2021    PICC TRIPLE LUMEN PLACEMENT Left 11/04/2021    5FR TL PICC. Right non occlusive thrombus subclavian vein.    REPLACE GASTROJEJUNOSTOMY TUBE, PERCUTANEOUS N/A 11/9/2021    Procedure: REPLACEMENT, GASTROJEJUNOSTOMY TUBE, PERCUTANEOUS;  Surgeon: Hernando Rodriguez MD;  Location:  GI    right acl      TRACHEOSTOMY PERCUTANEOUS N/A 10/29/2021    Procedure: Percutaneous Tracheostomy,;  Surgeon: Chris  Celine Ball MD;  Location: [facility] OR    TRANSPLANT LUNG RECIPIENT SINGLE X2 Bilateral 10/16/2021    Procedure: TRANSPLANT, LUNG, RECIPIENT, BILATERAL, Bronchoscopy, on-pump perfusion, bilateral clamshell sternotomy;  Surgeon: Yanick Corral MD;  Location: [facility] OR    XR ACROMIOCLAVICULAR JOINT BILATERAL         Medications:   Current Outpatient Medications   Medication Sig Dispense Refill    acetaminophen (TYLENOL) 325 MG tablet 3 tablets (975 mg) by Oral or Feeding Tube route every 8 hours as needed for mild pain 100 tablet 11    amLODIPine (NORVASC) 10 MG tablet Take 1 tablet (10 mg) by mouth At Bedtime 30 tablet 11    calcium carbonate 600 mg-vitamin D 400 units 600-400 MG-UNIT per tablet 1 tablet by Oral or Feeding Tube route daily 30 tablet 11    diclofenac (VOLTAREN) 1 % topical gel Apply 4 g topically 4 times daily Both hands 150 g 11    FLUoxetine (PROZAC) 20 MG capsule Take 20 mg by mouth daily      fluticasone (FLONASE) 50 MCG/ACT nasal spray Spray 1 spray into both nostrils daily 16 g 11    fluticasone-salmeterol (ADVAIR) 500-50 MCG/ACT inhaler Inhale 1 puff into the lungs every 12 hours 60 each 11    hydroxychloroquine (PLAQUENIL) 200 MG tablet Take 200 mg by mouth 2 times daily      insulin aspart (NOVOLOG PEN) 100 UNIT/ML pen Take 1-3 units TID if BS over 200. Max daily dose is 12 15 mL 11    insulin pen needle (32G X 4 MM) 32G X 4 MM miscellaneous Use 4 pen needles daily or as directed. 120 each 11    lamoTRIgine (LAMICTAL) 25 MG tablet Take 50 mg by mouth daily      levalbuterol (XOPENEX HFA) 45 MCG/ACT inhaler Inhale 2 puffs into the lungs 3 times daily 3 g 3    levothyroxine (SYNTHROID/LEVOTHROID) 25 MCG tablet Take 1 tablet (25 mcg) by mouth daily 30 tablet 11    Magnesium Glycinate POWD 200 mg 3 times daily Take 2 tablets (100mg each) three times daily 18 g 11    Melatonin 10 MG TABS tablet Take 1 tablet (10 mg) by mouth nightly as needed for sleep 30 tablet 3    mirtazapine (REMERON)  7.5 MG tablet Take 7.5 mg by mouth At Bedtime      multivitamin w/minerals (THERA-VIT-M) tablet Take 1 tablet by mouth daily 30 tablet 11    MYFORTIC (BRAND) 360 MG EC tablet Take 360 mg by mouth 2 times daily      ondansetron (ZOFRAN-ODT) 4 MG ODT tab Take 1 tablet (4 mg) by mouth every 6 hours as needed for nausea 30 tablet 3    pantoprazole (PROTONIX) 40 MG EC tablet Take 1 tablet (40 mg) by mouth 2 times daily (before meals) 60 tablet 11    predniSONE (DELTASONE) 2.5 MG tablet Take 1 tablet (2.5 mg) by mouth every evening Total dose: 5mg in AM and 2.5 mg in PM 30 tablet 11    predniSONE (DELTASONE) 5 MG tablet Take 1 tablet (5 mg) by mouth every morning AND 0.5 tablets (2.5 mg) every evening. 135 tablet 3    propranolol (INDERAL) 20 MG tablet Take 2 tablets (40 mg) by mouth 2 times daily 120 tablet 11    rOPINIRole (REQUIP) 0.25 MG tablet Take 0.25 mg by mouth At Bedtime      rosuvastatin (CRESTOR) 10 MG tablet Take 1 tablet (10 mg) by mouth daily 30 tablet 11    senna-docusate (SENOKOT-S/PERICOLACE) 8.6-50 MG tablet Take 2 tablets by mouth 2 times daily as needed for constipation 120 tablet 11    sulfamethoxazole-trimethoprim (BACTRIM) 400-80 MG tablet Take 1 tablet by mouth daily 30 tablet 11    tacrolimus (GENERIC EQUIVALENT) 1 mg/mL suspension TOTAL DOSE: 0.4 mg in AM and 0.5 mg in PM 90 mL 3    tamsulosin (FLOMAX) 0.4 MG capsule Take 1 capsule (0.4 mg) by mouth daily      voriconazole (VFEND) 200 MG tablet Take 1 tablet (200 mg) by mouth 2 times daily Total dose: 300mg two times daily 180 tablet 3    voriconazole (VFEND) 50 MG tablet Take 2 tablets (100 mg) by mouth 2 times daily Total dose: 300mg two times daily. 360 tablet 3    warfarin ANTICOAGULANT (COUMADIN) 1 MG tablet Take 0.5 tablets (0.5 mg) by mouth daily Take 1/2 tablet daily as directed by Anticoagulation Clinic. 90 tablet 1       Allergies:   No Known Allergies    Social History     Tobacco Use    Smoking status: Never    Smokeless tobacco:  Never   Vaping Use    Vaping status: Not on file   Substance Use Topics    Alcohol use: Never       Family History   Problem Relation Age of Onset    Diabetes Type 1 Mother     Heart Disease Mother     Chronic Obstructive Pulmonary Disease Mother     Rheumatoid Arthritis Father     Emphysema Paternal Grandfather        Physical Examination:  There were no vitals taken for this visit.  There is no height or weight on file to calculate BMI.    Wt Readings from Last 6 Encounters:   03/28/23 69.9 kg (154 lb)   03/14/23 69.6 kg (153 lb 6.4 oz)   12/20/22 73 kg (161 lb)   09/12/22 74.8 kg (165 lb)   06/29/22 72.3 kg (159 lb 8 oz)   06/22/22 69.8 kg (153 lb 14.4 oz)       General: Well appearing man in no distress, up and go gppd  Eyes: EOMI. Sclerae and conjunctivae are clear.   HENT: No thyromegaly or mass.    Lymphatic: No cervical or supraclavicular lymphadenopathy.  Cardiovascular: RRR, with normal S1+S2 and no murmurs.   Skin: No rash or lesions. No abdominal striae.    Extremities: No peripheral edema.   Neurologic: No tremor with hands outstretched. 2+ patellar reflexes.       Labs and Studies:   Lab Results   Component Value Date    ERIK 10.0 03/28/2023    ERIK 9.3 03/14/2023    ERIK 8.8 03/13/2023    ERIK 8.8 03/12/2023    ALBUMIN 4.5 03/28/2023    ALT 25 03/28/2023    PHOS 4.3 03/11/2023    PTHI 72 (H) 11/14/2022    AST 32 03/28/2023    BILITOTAL 0.2 03/28/2023    CR 1.61 (H) 03/28/2023    CR 1.31 (H) 03/14/2023    CR 1.27 (H) 03/13/2023     03/28/2023    TSH 3.17 11/19/2021    ALKPHOS 97 03/28/2023    TESTOSTTOTAL 232 (L) 11/14/2022    HGB 11.9 (L) 03/28/2023         Lab Results   Component Value Date     03/28/2023    CHLORIDE 108 04/21/2023    CO2 24 03/28/2023     (H) 03/28/2023    CR 1.61 (H) 03/28/2023    CR 1.31 (H) 03/14/2023    CR 1.27 (H) 03/13/2023    CR 1.31 (H) 03/12/2023    CR 1.34 (H) 03/11/2023    ERIK 10.0 03/28/2023    MAG 2.4 (H) 03/28/2023    ALBUMIN 4.5 03/28/2023    ALKPHOS 97  03/28/2023    LDL 69 11/14/2022    HDL 46 11/14/2022    TRIG 211 (H) 11/14/2022    TSH 3.17 11/19/2021    TSH 7.90 (H) 10/29/2021    TSH 0.11 (L) 09/07/2021     No results found for: MICROL  Lab Results   Component Value Date    A1C 6.1 (H) 11/14/2022    A1C 5.3 11/15/2021    A1C 6.6 (H) 09/07/2021    A1C 6.5 (H) 09/05/2021       Lab Results   Component Value Date    HGB 11.9 (L) 03/28/2023       Results for orders placed in visit on 11/14/22    Dexa hip/pelvis/spine*    Narrative  Images from the original result were not included.    [address] [address]  Phone: [phone]   Fax: [phone]        Impression  Based on BMD diagnosis is consistent with osteoporosis based on WHO criteria Ref. 1    Results  Lumbar spine  T-score -2.7 (L2-4), BMD is 0.918 g/cm2    Left femoral neck  T-score -2.5    Right femoral neck  T-score -2.1    Left total femur  T-score -2.0 , BMD is 0.813 g/cm2    Right total femur  T-score -2.1, BMD is 0.797 g/cm2    Interval change  No prior study available for comparison.    Fracture risk  Fracture risk calculation is not indicated. Ref.4    Repeat  Recommend repeat DXA in 2 years.    The above are general recommendations for follow-up testing and intervals between BMD testing should be determined according to each patient's clinical status.      Principal result :  JANET Mortensen MD, [facility]  Division of Endocrinology and Diabetes  Department of Medicine    Technical quality  Satisfactory.  Abnormal vertebrae may be excluded from analysis if there is more than a 1.0 T-score difference between the vertebrae in question and adjacent vertebrae. Note the wide range in BMD values and T-scores within the lumbar spine. In the circumstances, the lumbar spine is represented by L2-L4.      References:  Ref. 1. WHO categories:  T-score > -1.0  = normal             .  T-score -1.0 to -2.5 = low bone density  T-score <  -2.5 = osteoporosis        .    Ref. 2. 2015 ISCD official position statements:  www.iscd.org.    Ref. 3.  Today's examination is compared to the technically similar prior study of the total hip and femur if available. Only changes deemed likely to be significant based on historical data are reported.  According to the ISCD position statements, total hip rather than femoral neck regions are to be compared because larger areas give better precision.  LSC = least significant changes at the [facility] (historical data)  AP spine =  0.032 g/cm2  (6/26/2007)  Left hip = 0.029 g/cm2  (6/15/2007)  Right hip = 0.018 g/cm2  (6/15/2007)  Left mid radius = 0.043 g/cm2  (6/26/2007)  Please note that the differential diagnosis of increase in bone density at the lumbar spine includes improvement due to pharmacotherapy vs inter-current progression of spine degeneration or fracture.    Ref. 4 Fracture risk is calculated in patients aged 40 to 90 years old with low bone density not on osteoporosis treatment. A 10 year fracture risk of 3% and higher for hip fracture and 20% and higher for major osteoporotic fracture is considered higher than acceptable risk and might be an indication for medical treatment.    Ref. 5.  By definition, osteoporosis may be diagnosed in the presence or with the history of a low trauma or fragility fracture.  Fragility and low trauma fracture is defined as a fracture resulting from the force of a fall from a standing height or less or a bone that breaks under conditions that would not cause a normal bone to break.    Ref. 6. NOF Physician's Guideline Website address:  www.nof.org.    No results found for this or any previous visit.      Sincerely,    Billie Huntley MD

## 2023-04-25 NOTE — PATIENT INSTRUCTIONS
"Continue to eat diet rich in Calcium  Continue with multivitamin and Calcium supplement    Reclast/ Zoledronic acid infusion in 6/6/2023  Take Acetominophen 500 mg prior to infusion,   Blood work today    Please call the Infusion Sites and set up appointment for you injections/infusions.   This will trigger the prior authorization process for the bone medication if needed    [facility]: [phone] (option 3 then option 2) [facility]: [phone]  [facility]): [phone] (oral glucose scheduled in lab)  [facility]: [phone] (oral glucose scheduled in lab)  [place]: [phone]  [place] ([facility]): [phone]  [facility]): [phone]    24hour urine    24-Hour Urine Collection Guidelines for Patients    It does not matter what time of day you start the clock.  Make the collection time when it will be the most convenient for you.  If you miss adding any urine during the 24-hour period or if the container spills, you will need to start over.    If you think you will need more than one container during the 24-hour period, call the Lab for an additional container or continue collecting the urine in a clean (non-bleached), plastic container you have at home.    Please verify that the container is correctly labeled with your name, and your medical record number or birthdate.    1. Empty your bladder completely.  Do not save any of that urine.  Note the time, as this will be the \"START\" time of the 24-hour collection period.  2. Save ALL urine passed in the next 24 hours, adding each specimen to the collection container.  Keep the container refrigerated during the collection.  3. At the end of the 24-hour period, empty your bladder (even if you don't feel as if you need to urinate) and add it to the container.  4. When complete, bring the container to the Laboratory as soon as possible.  Be sure to stop at the Registration Desk before dropping your specimen off in the Lab.  " Note: If you are collecting urine for a Urosat (for stones), please try to bring the container to the Lab Monday-Friday.

## 2023-04-25 NOTE — RESULT ENCOUNTER NOTE
Tacrolimus level 7.8 at 12 hours, on 4/21/23.  Goal 8-10.   Current dose 0.4 mg in AM, 0.5 mg in PM    No change in dose    [employer] message sent

## 2023-04-25 NOTE — NURSING NOTE
"Chief Complaint   Patient presents with     Thyroid Problem     Osteoporosis     Vital signs:      BP: 116/72 Pulse: 67     SpO2: 97 %     Height: 162.6 cm (5' 4\") Weight: 70.3 kg (155 lb)  Estimated body mass index is 26.61 kg/m  as calculated from the following:    Height as of this encounter: 1.626 m (5' 4\").    Weight as of this encounter: 70.3 kg (155 lb).          "

## 2023-04-26 LAB
CMV DNA SPEC NAA+PROBE-ACNC: NOT DETECTED IU/ML
DONOR IDENTIFICATION: NORMAL
DSA COMMENTS: NORMAL
DSA PRESENT: NO
DSA TEST METHOD: NORMAL
EBV DNA # SPEC NAA+PROBE: NOT DETECTED COPIES/ML
ORGAN: NORMAL
SA 1 CELL: NORMAL
SA 1 TEST METHOD: NORMAL
SA 2 CELL: NORMAL
SA 2 TEST METHOD: NORMAL
SA1 HI RISK ABY: NORMAL
SA1 MOD RISK ABY: NORMAL
SA2 HI RISK ABY: NORMAL
SA2 MOD RISK ABY: NORMAL
UNACCEPTABLE ANTIGENS: NORMAL
UNOS CPRA: 31
ZZZSA 1  COMMENTS: NORMAL
ZZZSA 2 COMMENTS: NORMAL

## 2023-04-26 NOTE — LETTER
PHYSICIAN ORDERS      DATE & TIME ISSUED: 2023 3:28 PM  PATIENT NAME: Edson Thornton Jr.   : 1965     [facility] ED MR# [if applicable]: [id number]     DIAGNOSIS:  Lung Transplant  Z94.2    Please draw tacrolimus level, bmp, mag, cbc with platelets, and 1,3 Beta D glucan fungitell on 23    Any questions please call: Alfred [phone]    Please fax these results to [phone].      .

## 2023-04-28 NOTE — PROGRESS NOTES
[facility] lab called asking for order for 24 hour urine collection that patient dropped. Looks like test was ordered by Raad Huntley. Faxed order. Will update Dr. Dominique team.

## 2023-04-28 NOTE — LETTER
PHYSICIAN ORDERS      DATE & TIME ISSUED: 2023 9:04 AM  PATIENT NAME: Edson Thornton Jr.   : 1965     [facility] MR# [if applicable]: [id number]     DIAGNOSIS:  Lung Transplant  Z94.2  Please collect at home 24 hour urine calcium and creatinine      Any questions please call: Alfred [phone]    Please fax these results to [phone].      .

## 2023-05-01 ENCOUNTER — ANTICOAGULATION THERAPY VISIT (OUTPATIENT)
Dept: ANTICOAGULATION | Facility: CLINIC | Age: 58
End: 2023-05-01
Payer: COMMERCIAL

## 2023-05-01 DIAGNOSIS — I48.91 ATRIAL FIBRILLATION, UNSPECIFIED TYPE (H): Primary | ICD-10-CM

## 2023-05-01 DIAGNOSIS — Z79.899 ENCOUNTER FOR LONG-TERM (CURRENT) USE OF HIGH-RISK MEDICATION: ICD-10-CM

## 2023-05-01 LAB
DEPRECATED CALCIDIOL+CALCIFEROL SERPL-MC: <53 UG/L (ref 20–75)
VITAMIN D2 SERPL-MCNC: <5 UG/L
VITAMIN D3 SERPL-MCNC: 48 UG/L

## 2023-05-01 NOTE — PROGRESS NOTES
ANTICOAGULATION MANAGEMENT     Edson Thornton Jr. 57 year old male is on warfarin with therapeutic INR result. (Goal INR 2.0-3.0)    Recent labs: (last 7 days)     04/28/23  0746   INR 2.7*       ASSESSMENT       Source(s): Patient/Caregiver Call       Warfarin doses taken: Warfarin taken as instructed    Diet: No new diet changes identified    Medication/supplement changes: None noted    New illness, injury, or hospitalization: No    Signs or symptoms of bleeding or clotting: No    Previous result: Therapeutic last 2(+) visits    Additional findings: None         PLAN     Recommended plan for no diet, medication or health factor changes affecting INR     Dosing Instructions: Continue your current warfarin dose with next INR in 1 week       Summary  As of 5/1/2023    Full warfarin instructions:  3 mg every Sun, Tue, Thu; 2 mg all other days   Next INR check:  5/5/2023             Telephone call with Gilboa who verbalizes understanding and agrees to plan and who agrees to plan and repeated back plan correctly  Sent [employer] message with dosing and follow up instructions    Patient using outside facility for labs    Education provided:     None required    Plan made per [facility] anticoagulation protocol    Suni Choi, RN  Anticoagulation Clinic  5/1/2023    _______________________________________________________________________     Anticoagulation Episode Summary     Current INR goal:  2.0-3.0   TTR:  61.1 % (11.7 mo)   Target end date:  Indefinite   Send INR reminders to:  [facility]    Indications    Atrial fibrillation  unspecified type (H) [I48.91]  Encounter for long-term (current) use of high-risk medication [Z79.899]           Comments:           Anticoagulation Care Providers     Provider Role Specialty Phone number    Abiodun Woods MD Referring Pulmonary Disease [phone]

## 2023-05-02 ENCOUNTER — TELEPHONE (OUTPATIENT)
Dept: TRANSPLANT | Facility: CLINIC | Age: 58
End: 2023-05-02

## 2023-05-02 ENCOUNTER — DOCUMENTATION ONLY (OUTPATIENT)
Dept: ANTICOAGULATION | Facility: CLINIC | Age: 58
End: 2023-05-02
Payer: COMMERCIAL

## 2023-05-02 DIAGNOSIS — I48.91 ATRIAL FIBRILLATION, UNSPECIFIED TYPE (H): ICD-10-CM

## 2023-05-02 RX ORDER — WARFARIN SODIUM 1 MG/1
TABLET ORAL
Qty: 90 TABLET | Refills: 1 | Status: SHIPPED | OUTPATIENT
Start: 2023-05-02 | End: 2023-08-08

## 2023-05-02 NOTE — TELEPHONE ENCOUNTER
Haiderseven needs updated/new script on his Warfarin 1.0mg sent to [facility] [address] [place] [address], [place] [facility] [address] [facility]   Phone:  [phone]  Fax:  [phone]

## 2023-05-02 NOTE — PROGRESS NOTES
ANTICOAGULATION MANAGEMENT:  Medication Refill    Anticoagulation Summary  As of 5/1/2023    Warfarin maintenance plan:  3 mg (1 mg x 3) every Sun, Tue, Thu; 2 mg (1 mg x 2) all other days   Next INR check:  5/5/2023   Target end date:  Indefinite    Indications    Atrial fibrillation  unspecified type (H) [I48.91]  Encounter for long-term (current) use of high-risk medication [Z79.899]             Anticoagulation Care Providers     Provider Role Specialty Phone number    Abiodun Woods MD Referring Pulmonary Disease [phone]          Visit with referring provider/group within last year: Yes    [facility] referral signed within last year: Yes    Edson meets all criteria for refill (current [facility] referral, office visit with referring provider/group in last year, lab monitoring up to date or not exceeding 2 weeks overdue). Rx instructions and quantity match patient's current dosing plan. Warfarin 90 day supply with 1 refill granted per [facility] protocol     Suni Choi RN  Anticoagulation Clinic

## 2023-05-05 ENCOUNTER — LAB (OUTPATIENT)
Dept: LAB | Facility: CLINIC | Age: 58
End: 2023-05-05

## 2023-05-05 DIAGNOSIS — Z94.2 S/P LUNG TRANSPLANT (H): Primary | ICD-10-CM

## 2023-05-05 PROCEDURE — 80197 ASSAY OF TACROLIMUS: CPT | Performed by: INTERNAL MEDICINE

## 2023-05-08 ENCOUNTER — ANTICOAGULATION THERAPY VISIT (OUTPATIENT)
Dept: ANTICOAGULATION | Facility: CLINIC | Age: 58
End: 2023-05-08
Payer: COMMERCIAL

## 2023-05-08 DIAGNOSIS — I48.91 ATRIAL FIBRILLATION, UNSPECIFIED TYPE (H): Primary | ICD-10-CM

## 2023-05-08 DIAGNOSIS — Z79.899 ENCOUNTER FOR LONG-TERM (CURRENT) USE OF HIGH-RISK MEDICATION: ICD-10-CM

## 2023-05-08 LAB
TACROLIMUS BLD-MCNC: 6.8 UG/L (ref 5–15)
TME LAST DOSE: NORMAL H
TME LAST DOSE: NORMAL H

## 2023-05-08 NOTE — PROGRESS NOTES
ANTICOAGULATION MANAGEMENT     Edson Thornton Jr. 57 year old male is on warfarin with therapeutic INR result. (Goal INR 2.0-3.0)    Recent labs: (last 7 days)2     05/05/23  0831   INR 2.4*       ASSESSMENT       Source(s): Chart Review and Patient/Caregiver Call       Warfarin doses taken: Warfarin taken as instructed    Diet: No new diet changes identified    Medication/supplement changes: None noted    New illness, injury, or hospitalization: No    Signs or symptoms of bleeding or clotting: No    Previous result: Therapeutic last 2(+) visits    Additional findings: None         PLAN     Recommended plan for no diet, medication or health factor changes affecting INR     Dosing Instructions: Continue your current warfarin dose with next INR in 1 week       Summary  As of 5/8/2023    Full warfarin instructions:  3 mg every Sun, Tue, Thu; 2 mg all other days   Next INR check:               Telephone call with Bret who verbalizes understanding and agrees to plan    Patient to recheck with home meter    Education provided:     Goal range and lab monitoring: goal range and significance of current result    Plan made per [facility] anticoagulation protocol    Dionne Lopez RN  Anticoagulation Clinic  5/8/2023    _______________________________________________________________________     Anticoagulation Episode Summary     Current INR goal:  2.0-3.0   TTR:  63.1 % (11.7 mo)   Target end date:  Indefinite   Send INR reminders to:  [facility]    Indications    Atrial fibrillation  unspecified type (H) [I48.91]  Encounter for long-term (current) use of high-risk medication [Z79.899]           Comments:           Anticoagulation Care Providers     Provider Role Specialty Phone number    Abiodun Woods MD Referring Pulmonary Disease [phone]

## 2023-05-09 ENCOUNTER — TELEPHONE (OUTPATIENT)
Dept: TRANSPLANT | Facility: CLINIC | Age: 58
End: 2023-05-09
Payer: COMMERCIAL

## 2023-05-09 DIAGNOSIS — Z94.2 LUNG REPLACED BY TRANSPLANT (H): ICD-10-CM

## 2023-05-09 DIAGNOSIS — I10 HYPERTENSION: ICD-10-CM

## 2023-05-09 DIAGNOSIS — D84.9 IMMUNOSUPPRESSED STATUS (H): ICD-10-CM

## 2023-05-09 NOTE — TELEPHONE ENCOUNTER
Tacrolimus level 6.8 at 12.5 hours, on 5/5/23.  Goal 8-10.   Current dose 0.4 mg in AM, 0.5 mg in PM    Dose changed to 0.5 mls in AM, 0.5 mls in PM   Recheck level in a week  Discussed with patient       Doppler done at outside facility, from care everywhere:   Exam Completed: DUPLEX EXTREMITY VEINS ANDREWS LOWER  Critical Finding: No  Preliminary Finding: Negative    Fungitell 372. Plan to trend.     Pt reports swelling improved, barely there in AM.   Wearing compressions socks.   BP slightly higher over the weekend, see carlos. Patient has been taking BP before medications taken. Will start to take BP/HR after meds and let writer know they have been running.     5-6  am 121/77           pm 133/82     5/7 am 122/74          pm 139/85     5/8 am 135/78          pm 138/84     5/9 am 140/86          pm 142/87      Reviewed with Dr. Woods. Continue to trend BP/HR after medications taken. Will check in with patient in 5 days.

## 2023-05-10 RX ORDER — AMLODIPINE BESYLATE 10 MG/1
5 TABLET ORAL AT BEDTIME
Qty: 15 TABLET | Refills: 11
Start: 2023-05-10 | End: 2023-11-14

## 2023-05-12 ENCOUNTER — LAB (OUTPATIENT)
Dept: LAB | Facility: CLINIC | Age: 58
End: 2023-05-12

## 2023-05-12 ENCOUNTER — TELEPHONE (OUTPATIENT)
Dept: TRANSPLANT | Facility: CLINIC | Age: 58
End: 2023-05-12

## 2023-05-12 DIAGNOSIS — R09.89 PULMONARY AIR TRAPPING: ICD-10-CM

## 2023-05-12 DIAGNOSIS — Z94.2 S/P LUNG TRANSPLANT (H): Primary | ICD-10-CM

## 2023-05-12 PROCEDURE — 80197 ASSAY OF TACROLIMUS: CPT | Performed by: INTERNAL MEDICINE

## 2023-05-12 RX ORDER — LEVALBUTEROL TARTRATE 45 UG/1
AEROSOL, METERED ORAL
Qty: 15 G | Refills: 0 | Status: SHIPPED | OUTPATIENT
Start: 2023-05-12 | End: 2023-06-27

## 2023-05-12 NOTE — TELEPHONE ENCOUNTER
Bret is needing new standing orders faxed over to [facility] patient would like a follow up call when orders are placed so he can schedule his appointment any questions feel free to call. Fax number for lab is [phone]

## 2023-05-15 ENCOUNTER — ANTICOAGULATION THERAPY VISIT (OUTPATIENT)
Dept: ANTICOAGULATION | Facility: CLINIC | Age: 58
End: 2023-05-15
Payer: COMMERCIAL

## 2023-05-15 DIAGNOSIS — I48.91 ATRIAL FIBRILLATION, UNSPECIFIED TYPE (H): Primary | ICD-10-CM

## 2023-05-15 DIAGNOSIS — Z79.899 ENCOUNTER FOR LONG-TERM (CURRENT) USE OF HIGH-RISK MEDICATION: ICD-10-CM

## 2023-05-15 LAB
TACROLIMUS BLD-MCNC: 8 UG/L (ref 5–15)
TME LAST DOSE: NORMAL H
TME LAST DOSE: NORMAL H

## 2023-05-15 NOTE — PROGRESS NOTES
ANTICOAGULATION MANAGEMENT     Edson Thornton Jr. 57 year old male is on warfarin with therapeutic INR result. (Goal INR 2.0-3.0)    Recent labs: (last 7 days)     05/12/23  0802   INR 2.5*       ASSESSMENT       Source(s): Chart Review and Patient/Caregiver Call       Warfarin doses taken: Warfarin taken as instructed    Diet: No new diet changes identified    Medication/supplement changes: None noted    New illness, injury, or hospitalization: No    Signs or symptoms of bleeding or clotting: No    Previous result: Therapeutic last 2(+) visits    Additional findings: Bret has weekly labs drawn on Fridays at an outside lab.  -changed priority to critical so we know to look for INR in [facility] on Fridays when he has it done.  Bret is aware if INR comes back out of range on a Friday and he does not hear from the [facility], he is to contact an on call provider for dosing.         PLAN     Recommended plan for no diet, medication or health factor changes affecting INR     Dosing Instructions: Continue your current warfarin dose with next INR in 1 week       Summary  As of 5/15/2023    Full warfarin instructions:  3 mg every Sun, Tue, Thu; 2 mg all other days   Next INR check:  5/19/2023             Telephone call with Bret who verbalizes understanding and agrees to plan    Patient using outside facility for labs    Education provided:     Contact [phone] with any changes, questions or concerns.     Plan made per [facility] anticoagulation protocol    Mesha Chau RN  Anticoagulation Clinic  5/15/2023    _______________________________________________________________________     Anticoagulation Episode Summary     Current INR goal:  2.0-3.0   TTR:  65.1 % (11.7 mo)   Target end date:  Indefinite   Send INR reminders to:  [facility]    Indications    Atrial fibrillation  unspecified type (H) [I48.91]  Encounter for long-term (current) use of high-risk medication [Z79.899]           Comments:           Anticoagulation  Care Providers     Provider Role Specialty Phone number    Abiodun Woods MD Referring Pulmonary Disease [phone]

## 2023-05-16 NOTE — RESULT ENCOUNTER NOTE
Tacrolimus level 8.0 at 12 hours, on 5/12/23.  Goal 8-10.   Current dose 0.5 mg in AM, 0.5 mg in PM    No change in dose, level at goal   [employer] message sent

## 2023-05-17 NOTE — PROGRESS NOTES
Pt sends the following vitals after decreasing amlodipine to 5mg daily and checking one hour after medication administration.     10 am 127/81      74             pm 131/77     62               5/11 am 136/80.     64             pm 144/85     69  Hour after                       5/12 am 136/84       64             pm 125/80      69  Hour after 137/71   63     5/13 am 135/77        69             pm 138/76       65  Hour after 135/78    60     5/14 am 132/73.       79             pm 130/81       68    Reviewed with Dr. Woods: continue to trend for now

## 2023-05-19 ENCOUNTER — ANTICOAGULATION THERAPY VISIT (OUTPATIENT)
Dept: ANTICOAGULATION | Facility: CLINIC | Age: 58
End: 2023-05-19
Payer: COMMERCIAL

## 2023-05-19 ENCOUNTER — LAB (OUTPATIENT)
Dept: LAB | Facility: CLINIC | Age: 58
End: 2023-05-19

## 2023-05-19 DIAGNOSIS — Z94.2 S/P LUNG TRANSPLANT (H): Primary | ICD-10-CM

## 2023-05-19 DIAGNOSIS — I48.91 ATRIAL FIBRILLATION, UNSPECIFIED TYPE (H): Primary | ICD-10-CM

## 2023-05-19 DIAGNOSIS — Z79.899 ENCOUNTER FOR LONG-TERM (CURRENT) USE OF HIGH-RISK MEDICATION: ICD-10-CM

## 2023-05-19 PROCEDURE — 80197 ASSAY OF TACROLIMUS: CPT | Performed by: INTERNAL MEDICINE

## 2023-05-19 RX ORDER — VIBEGRON 75 MG/1
75 TABLET, FILM COATED ORAL DAILY
Refills: 0 | COMMUNITY
Start: 2023-05-19 | End: 2023-06-06

## 2023-05-19 NOTE — PROGRESS NOTES
ANTICOAGULATION MANAGEMENT     Edson Thornton Jr. 57 year old male is on warfarin with therapeutic INR result. (Goal INR 2.0-3.0)    Recent labs: (last 7 days)     05/19/23  0819   INR 2.4*       ASSESSMENT       Source(s): Patient/Caregiver Call       Warfarin doses taken: Warfarin taken as instructed    Diet: No new diet changes identified    Medication/supplement changes: Amlodipine decreased to 5mg at Bedtime and started on Gemtesa 75mg Daily.    New illness, injury, or hospitalization: No    Signs or symptoms of bleeding or clotting: No    Previous result: Therapeutic last 2(+) visits    Additional findings: None         PLAN     Recommended plan for no diet, medication or health factor changes affecting INR     Dosing Instructions: Continue your current warfarin dose with next INR in 1 week       Summary  As of 5/19/2023    Full warfarin instructions:  3 mg every Sun, Tue, Thu; 2 mg all other days   Next INR check:  5/26/2023             Telephone call with Bret who verbalizes understanding and agrees to plan and who agrees to plan and repeated back plan correctly    Patient using outside facility for labs    Education provided:     Interaction IS NOT anticipated between warfarin and Gemtesa    Plan made per [facility] anticoagulation protocol    Suni Choi, RN  Anticoagulation Clinic  5/19/2023    _______________________________________________________________________     Anticoagulation Episode Summary     Current INR goal:  2.0-3.0   TTR:  66.6 % (11.8 mo)   Target end date:  Indefinite   Send INR reminders to:  [facility]    Indications    Atrial fibrillation  unspecified type (H) [I48.91]  Encounter for long-term (current) use of high-risk medication [Z79.899]           Comments:           Anticoagulation Care Providers     Provider Role Specialty Phone number    Abiodun Woods MD Referring Pulmonary Disease [phone]

## 2023-05-22 LAB
TACROLIMUS BLD-MCNC: 7.8 UG/L (ref 5–15)
TME LAST DOSE: NORMAL H
TME LAST DOSE: NORMAL H

## 2023-05-23 NOTE — RESULT ENCOUNTER NOTE
Tacrolimus level 7.8 at 12 hours, on 5/19/23.  Goal 8-10.   Current dose 0.5 mg in AM, 0.5 mg in PM    Level at goal.  No dose change.    [employer] message sent

## 2023-05-25 DIAGNOSIS — Z94.2 LUNG REPLACED BY TRANSPLANT (H): Primary | ICD-10-CM

## 2023-05-25 RX ORDER — MYCOPHENOLIC ACID 360 MG/1
360 TABLET, DELAYED RELEASE ORAL 2 TIMES DAILY
Qty: 180 TABLET | Refills: 3 | Status: SHIPPED | OUTPATIENT
Start: 2023-05-25 | End: 2023-08-28

## 2023-05-26 ENCOUNTER — LAB (OUTPATIENT)
Dept: LAB | Facility: CLINIC | Age: 58
End: 2023-05-26

## 2023-05-26 ENCOUNTER — ANTICOAGULATION THERAPY VISIT (OUTPATIENT)
Dept: ANTICOAGULATION | Facility: CLINIC | Age: 58
End: 2023-05-26
Payer: COMMERCIAL

## 2023-05-26 DIAGNOSIS — Z94.2 S/P LUNG TRANSPLANT (H): Primary | ICD-10-CM

## 2023-05-26 DIAGNOSIS — I48.91 ATRIAL FIBRILLATION, UNSPECIFIED TYPE (H): Primary | ICD-10-CM

## 2023-05-26 DIAGNOSIS — Z79.899 ENCOUNTER FOR LONG-TERM (CURRENT) USE OF HIGH-RISK MEDICATION: ICD-10-CM

## 2023-05-26 PROCEDURE — 80197 ASSAY OF TACROLIMUS: CPT | Performed by: INTERNAL MEDICINE

## 2023-05-26 NOTE — PROGRESS NOTES
ANTICOAGULATION MANAGEMENT     Edson Thornton Jr. 58 year old male is on warfarin with therapeutic INR result. (Goal INR 2.0-3.0)    Recent labs: (last 7 days)     05/26/23  0657   INR 2.2*       ASSESSMENT       Source(s): Chart Review and Patient/Caregiver Call       Warfarin doses taken: Warfarin taken as instructed    Diet: No new diet changes identified    Medication/supplement changes: None noted    New illness, injury, or hospitalization: No    Signs or symptoms of bleeding or clotting: No    Previous result: Therapeutic last 2(+) visits    Additional findings: None         PLAN     Recommended plan for no diet, medication or health factor changes affecting INR     Dosing Instructions: Continue your current warfarin dose with next INR in 1 week       Summary  As of 5/26/2023    Full warfarin instructions:  3 mg every Sun, Tue, Thu; 2 mg all other days   Next INR check:  6/2/2023             Telephone call with Bret who verbalizes understanding and agrees to plan    Patient using outside facility for labs    Education provided:     Contact [phone] with any changes, questions or concerns.     Plan made per [facility] anticoagulation protocol    Annabel Leiva RN  Anticoagulation Clinic  5/26/2023    _______________________________________________________________________     Anticoagulation Episode Summary     Current INR goal:  2.0-3.0   TTR:  68.5 % (11.8 mo)   Target end date:  Indefinite   Send INR reminders to:  [facility]    Indications    Atrial fibrillation  unspecified type (H) [I48.91]  Encounter for long-term (current) use of high-risk medication [Z79.899]           Comments:           Anticoagulation Care Providers     Provider Role Specialty Phone number    Abiodun Woods MD Referring Pulmonary Disease [phone]

## 2023-05-31 ENCOUNTER — DOCUMENTATION ONLY (OUTPATIENT)
Dept: ANTICOAGULATION | Facility: CLINIC | Age: 58
End: 2023-05-31
Payer: COMMERCIAL

## 2023-05-31 ENCOUNTER — TELEPHONE (OUTPATIENT)
Dept: TRANSPLANT | Facility: CLINIC | Age: 58
End: 2023-05-31

## 2023-05-31 ENCOUNTER — TELEPHONE (OUTPATIENT)
Dept: TRANSPLANT | Facility: CLINIC | Age: 58
End: 2023-05-31
Payer: COMMERCIAL

## 2023-05-31 DIAGNOSIS — Z79.899 ENCOUNTER FOR LONG-TERM (CURRENT) USE OF HIGH-RISK MEDICATION: ICD-10-CM

## 2023-05-31 DIAGNOSIS — Z94.2 LUNG REPLACED BY TRANSPLANT (H): Primary | ICD-10-CM

## 2023-05-31 DIAGNOSIS — I48.91 ATRIAL FIBRILLATION, UNSPECIFIED TYPE (H): Primary | ICD-10-CM

## 2023-05-31 LAB
TACROLIMUS BLD-MCNC: 9.5 UG/L (ref 5–15)
TME LAST DOSE: NORMAL H
TME LAST DOSE: NORMAL H

## 2023-05-31 RX ORDER — LEVOFLOXACIN 750 MG/1
750 TABLET, FILM COATED ORAL DAILY
Qty: 14 TABLET | Refills: 0 | Status: SHIPPED | OUTPATIENT
Start: 2023-05-31 | End: 2023-06-14

## 2023-05-31 NOTE — TELEPHONE ENCOUNTER
Suni, NP calling from [place], pt came in to clinic this AM for sick visit.     Pt c/o Abdominal distension, HA, balance off, vomited x2 this morning. Sats 94%. Feeling off.   NP ordered Chest/abdominal x-ray and CT abdomen. X-ray unremarkable, CT noted LLL PNA. Suni wondering if we should start abx.    Writer called patient to touch base:  BP  140/81  HR 82  Temp 99.1  Hasn't been around anybody that has been sick.   BM's hard today, but has been going every day otherwise.   More SOB at rest/activity. Sats have been >92%.   No cough, not bringing up any sputum.     Called [place] [facility] and ask images be pushed to PACs.     Addendum: Dr. Pagan reviewed images, agrees with LLL PNA.   - levaquin 750mg daily x14 days. Pt aware of achilles tendon pain warning- knows to stop medication and call transplant office if he experiences symptoms.   -message sent to coumadin clinic   -EKG next Tuesday at clinic visit  -Pt instructed to call transplant office or follow up with local NP if symptoms worsen at all    Plan communicated to local NP. NP also adding on Lipase for full abdominal workup.

## 2023-05-31 NOTE — PROGRESS NOTES
Received staff message:    Alfred Colvin RN  [facility]  Hello,   FYI patient will be starting on levaquin 750mg daily x14 days.     Alfred Santiago     ANTICOAGULATION  MANAGEMENT     Interacting Medication Review    Interacting medication(s): Levofloxacin (Levaquin) with warfarin.    Duration: 14 days  (5/31/23 to 6/14/23)    Indication: Pneumonia    New medication?: Yes, interaction may increase INR and risk of bleeding. Closer INR monitoring recommended.        PLAN     Continue your current warfarin dose with next INR in 2 days              Patient was not contacted    No adjustment to anticoagulation calendar was required    Plan made per [facility] anticoagulation protocol    ESSIE COLÓN, RN  Anticoagulation Clinic

## 2023-06-01 NOTE — RESULT ENCOUNTER NOTE
Tacrolimus level 9.5 at 11 hours, on 5/26/23.  Goal 8-10.   Current dose 0.5 mg in AM, 0.5 mg in PM    Level at goal, no change in dose.   Discussed with patient

## 2023-06-02 ENCOUNTER — ANTICOAGULATION THERAPY VISIT (OUTPATIENT)
Dept: ANTICOAGULATION | Facility: CLINIC | Age: 58
End: 2023-06-02
Payer: COMMERCIAL

## 2023-06-02 ENCOUNTER — LAB (OUTPATIENT)
Dept: LAB | Facility: CLINIC | Age: 58
End: 2023-06-02

## 2023-06-02 DIAGNOSIS — Z94.2 LUNG REPLACED BY TRANSPLANT (H): Primary | ICD-10-CM

## 2023-06-02 DIAGNOSIS — Z79.899 ENCOUNTER FOR LONG-TERM (CURRENT) USE OF HIGH-RISK MEDICATION: ICD-10-CM

## 2023-06-02 DIAGNOSIS — I48.91 ATRIAL FIBRILLATION, UNSPECIFIED TYPE (H): Primary | ICD-10-CM

## 2023-06-02 PROCEDURE — 80197 ASSAY OF TACROLIMUS: CPT | Performed by: INTERNAL MEDICINE

## 2023-06-02 NOTE — PROGRESS NOTES
ANTICOAGULATION MANAGEMENT     Edson Thornton Jr. 58 year old male is on warfarin with subtherapeutic INR result. (Goal INR 2.0-3.0)    Recent labs: (last 7 days)     06/02/23  0705   INR 1.9*       ASSESSMENT       Source(s): Chart Review and Patient/Caregiver Call       Warfarin doses taken: Warfarin taken as instructed    Diet: No new diet changes identified    Medication/supplement changes: started levaquin 750 mg daily x 14 days (5/31/23 - 6/14/23)    New illness, injury, or hospitalization: No    Signs or symptoms of bleeding or clotting: No    Previous result: Therapeutic last 2(+) visits    Additional findings: None         PLAN     Recommended plan for no diet, medication or health factor changes affecting INR     Dosing Instructions: Continue your current warfarin dose with next INR in 4 days       Summary  As of 6/2/2023    Full warfarin instructions:  3 mg every Sun, Tue, Thu; 2 mg all other days   Next INR check:  6/6/2023             Telephone call with Bret who agrees to plan and repeated back plan correctly    Check at provider office visit    Education provided:     Contact [phone] with any changes, questions or concerns.     Plan made per [facility] anticoagulation protocol    Mesha Chau, RN  Anticoagulation Clinic  6/2/2023    _______________________________________________________________________     Anticoagulation Episode Summary     Current INR goal:  2.0-3.0   TTR:  69.7 % (11.8 mo)   Target end date:  Indefinite   Send INR reminders to:  [facility]    Indications    Atrial fibrillation  unspecified type (H) [I48.91]  Encounter for long-term (current) use of high-risk medication [Z79.899]           Comments:           Anticoagulation Care Providers     Provider Role Specialty Phone number    Abiodun Woods MD Referring Pulmonary Disease [phone]

## 2023-06-05 LAB
TACROLIMUS BLD-MCNC: 10.6 UG/L (ref 5–15)
TME LAST DOSE: NORMAL H
TME LAST DOSE: NORMAL H

## 2023-06-05 RX ORDER — DIPHENHYDRAMINE HYDROCHLORIDE 50 MG/ML
50 INJECTION INTRAMUSCULAR; INTRAVENOUS
Status: CANCELLED
Start: 2023-06-05

## 2023-06-05 RX ORDER — METHYLPREDNISOLONE SODIUM SUCCINATE 125 MG/2ML
125 INJECTION, POWDER, LYOPHILIZED, FOR SOLUTION INTRAMUSCULAR; INTRAVENOUS
Status: CANCELLED
Start: 2023-06-05

## 2023-06-05 RX ORDER — EPINEPHRINE 1 MG/ML
0.3 INJECTION, SOLUTION INTRAMUSCULAR; SUBCUTANEOUS EVERY 5 MIN PRN
Status: CANCELLED | OUTPATIENT
Start: 2023-06-05

## 2023-06-05 RX ORDER — ZOLEDRONIC ACID 5 MG/100ML
5 INJECTION, SOLUTION INTRAVENOUS ONCE
Status: CANCELLED
Start: 2023-06-05

## 2023-06-05 RX ORDER — HEPARIN SODIUM (PORCINE) LOCK FLUSH IV SOLN 100 UNIT/ML 100 UNIT/ML
5 SOLUTION INTRAVENOUS
Status: CANCELLED | OUTPATIENT
Start: 2023-06-05

## 2023-06-05 RX ORDER — HEPARIN SODIUM,PORCINE 10 UNIT/ML
5 VIAL (ML) INTRAVENOUS
Status: CANCELLED | OUTPATIENT
Start: 2023-06-05

## 2023-06-05 RX ORDER — ALBUTEROL SULFATE 0.83 MG/ML
2.5 SOLUTION RESPIRATORY (INHALATION)
Status: CANCELLED | OUTPATIENT
Start: 2023-06-05

## 2023-06-05 RX ORDER — ALBUTEROL SULFATE 90 UG/1
1-2 AEROSOL, METERED RESPIRATORY (INHALATION)
Status: CANCELLED
Start: 2023-06-05

## 2023-06-06 ENCOUNTER — APPOINTMENT (OUTPATIENT)
Dept: LAB | Facility: CLINIC | Age: 58
End: 2023-06-06
Attending: INTERNAL MEDICINE
Payer: COMMERCIAL

## 2023-06-06 ENCOUNTER — INFUSION THERAPY VISIT (OUTPATIENT)
Dept: INFUSION THERAPY | Facility: CLINIC | Age: 58
End: 2023-06-06
Attending: INTERNAL MEDICINE
Payer: COMMERCIAL

## 2023-06-06 ENCOUNTER — OFFICE VISIT (OUTPATIENT)
Dept: TRANSPLANT | Facility: CLINIC | Age: 58
End: 2023-06-06
Attending: INTERNAL MEDICINE
Payer: COMMERCIAL

## 2023-06-06 ENCOUNTER — ANCILLARY PROCEDURE (OUTPATIENT)
Dept: CARDIOLOGY | Facility: CLINIC | Age: 58
End: 2023-06-06

## 2023-06-06 ENCOUNTER — ANCILLARY PROCEDURE (OUTPATIENT)
Dept: CT IMAGING | Facility: CLINIC | Age: 58
End: 2023-06-06
Attending: INTERNAL MEDICINE

## 2023-06-06 ENCOUNTER — TELEPHONE (OUTPATIENT)
Dept: ANTICOAGULATION | Facility: CLINIC | Age: 58
End: 2023-06-06

## 2023-06-06 VITALS
BODY MASS INDEX: 25.59 KG/M2 | RESPIRATION RATE: 16 BRPM | WEIGHT: 149.1 LBS | OXYGEN SATURATION: 97 % | SYSTOLIC BLOOD PRESSURE: 109 MMHG | TEMPERATURE: 97.7 F | DIASTOLIC BLOOD PRESSURE: 70 MMHG | HEART RATE: 60 BPM

## 2023-06-06 VITALS
OXYGEN SATURATION: 96 % | BODY MASS INDEX: 25.58 KG/M2 | TEMPERATURE: 97.9 F | HEART RATE: 67 BPM | SYSTOLIC BLOOD PRESSURE: 122 MMHG | DIASTOLIC BLOOD PRESSURE: 73 MMHG | WEIGHT: 149 LBS

## 2023-06-06 DIAGNOSIS — E83.42 HYPOMAGNESEMIA: ICD-10-CM

## 2023-06-06 DIAGNOSIS — Z94.2 LUNG REPLACED BY TRANSPLANT (H): Primary | ICD-10-CM

## 2023-06-06 DIAGNOSIS — Z94.2 LUNG REPLACED BY TRANSPLANT (H): ICD-10-CM

## 2023-06-06 DIAGNOSIS — D80.1 HYPOGAMMAGLOBULINEMIA (H): ICD-10-CM

## 2023-06-06 DIAGNOSIS — B44.9 ASPERGILLUS (H): ICD-10-CM

## 2023-06-06 DIAGNOSIS — M81.8 OTHER OSTEOPOROSIS WITHOUT CURRENT PATHOLOGICAL FRACTURE: Primary | ICD-10-CM

## 2023-06-06 DIAGNOSIS — Z79.899 ENCOUNTER FOR LONG-TERM (CURRENT) USE OF HIGH-RISK MEDICATION: ICD-10-CM

## 2023-06-06 DIAGNOSIS — D84.9 IMMUNOSUPPRESSED STATUS (H): ICD-10-CM

## 2023-06-06 DIAGNOSIS — Z94.2 S/P LUNG TRANSPLANT (H): ICD-10-CM

## 2023-06-06 DIAGNOSIS — I10 PRIMARY HYPERTENSION: ICD-10-CM

## 2023-06-06 DIAGNOSIS — Z94.2 S/P LUNG TRANSPLANT (H): Chronic | ICD-10-CM

## 2023-06-06 DIAGNOSIS — J18.9 PNEUMONIA OF LEFT LOWER LOBE DUE TO INFECTIOUS ORGANISM: ICD-10-CM

## 2023-06-06 LAB
ANION GAP SERPL CALCULATED.3IONS-SCNC: 11 MMOL/L (ref 7–15)
BUN SERPL-MCNC: 28.3 MG/DL (ref 6–20)
CALCIUM SERPL-MCNC: 9.7 MG/DL (ref 8.6–10)
CALCIUM SERPL-MCNC: 9.8 MG/DL (ref 8.6–10)
CHLORIDE SERPL-SCNC: 108 MMOL/L (ref 98–107)
CREAT SERPL-MCNC: 1.45 MG/DL (ref 0.67–1.17)
CREAT SERPL-MCNC: 1.46 MG/DL (ref 0.67–1.17)
DEPRECATED HCO3 PLAS-SCNC: 23 MMOL/L (ref 22–29)
ERYTHROCYTE [DISTWIDTH] IN BLOOD BY AUTOMATED COUNT: 15.7 % (ref 10–15)
GFR SERPL CREATININE-BSD FRML MDRD: 55 ML/MIN/1.73M2
GFR SERPL CREATININE-BSD FRML MDRD: 56 ML/MIN/1.73M2
GLUCOSE SERPL-MCNC: 92 MG/DL (ref 70–99)
HCT VFR BLD AUTO: 35.9 % (ref 40–53)
HGB BLD-MCNC: 11 G/DL (ref 13.3–17.7)
LVEF ECHO: NORMAL
MAGNESIUM SERPL-MCNC: 1.7 MG/DL (ref 1.7–2.3)
MCH RBC QN AUTO: 24 PG (ref 26.5–33)
MCHC RBC AUTO-ENTMCNC: 30.6 G/DL (ref 31.5–36.5)
MCV RBC AUTO: 78 FL (ref 78–100)
PLATELET # BLD AUTO: 196 10E3/UL (ref 150–450)
POTASSIUM SERPL-SCNC: 4.4 MMOL/L (ref 3.4–5.3)
RBC # BLD AUTO: 4.58 10E6/UL (ref 4.4–5.9)
SODIUM SERPL-SCNC: 142 MMOL/L (ref 136–145)
TACROLIMUS BLD-MCNC: 7.5 UG/L (ref 5–15)
TME LAST DOSE: NORMAL H
TME LAST DOSE: NORMAL H
WBC # BLD AUTO: 7.5 10E3/UL (ref 4–11)

## 2023-06-06 PROCEDURE — 71250 CT THORAX DX C-: CPT | Performed by: RADIOLOGY

## 2023-06-06 PROCEDURE — 85027 COMPLETE CBC AUTOMATED: CPT

## 2023-06-06 PROCEDURE — 93306 TTE W/DOPPLER COMPLETE: CPT | Performed by: STUDENT IN AN ORGANIZED HEALTH CARE EDUCATION/TRAINING PROGRAM

## 2023-06-06 PROCEDURE — 82565 ASSAY OF CREATININE: CPT | Performed by: INTERNAL MEDICINE

## 2023-06-06 PROCEDURE — 80285 DRUG ASSAY VORICONAZOLE: CPT

## 2023-06-06 PROCEDURE — 80048 BASIC METABOLIC PNL TOTAL CA: CPT

## 2023-06-06 PROCEDURE — 82310 ASSAY OF CALCIUM: CPT | Performed by: INTERNAL MEDICINE

## 2023-06-06 PROCEDURE — 36415 COLL VENOUS BLD VENIPUNCTURE: CPT

## 2023-06-06 PROCEDURE — 96365 THER/PROPH/DIAG IV INF INIT: CPT

## 2023-06-06 PROCEDURE — 87449 NOS EACH ORGANISM AG IA: CPT

## 2023-06-06 PROCEDURE — 87305 ASPERGILLUS AG IA: CPT

## 2023-06-06 PROCEDURE — 250N000011 HC RX IP 250 OP 636: Performed by: INTERNAL MEDICINE

## 2023-06-06 PROCEDURE — 83735 ASSAY OF MAGNESIUM: CPT

## 2023-06-06 PROCEDURE — 94375 RESPIRATORY FLOW VOLUME LOOP: CPT | Performed by: INTERNAL MEDICINE

## 2023-06-06 PROCEDURE — 99213 OFFICE O/P EST LOW 20 MIN: CPT | Mod: 25 | Performed by: INTERNAL MEDICINE

## 2023-06-06 PROCEDURE — 80197 ASSAY OF TACROLIMUS: CPT

## 2023-06-06 PROCEDURE — 99215 OFFICE O/P EST HI 40 MIN: CPT | Mod: 25 | Performed by: INTERNAL MEDICINE

## 2023-06-06 RX ORDER — MIRABEGRON 50 MG/1
50 TABLET, EXTENDED RELEASE ORAL DAILY
COMMUNITY
End: 2024-02-13

## 2023-06-06 RX ORDER — ZOLEDRONIC ACID 5 MG/100ML
5 INJECTION, SOLUTION INTRAVENOUS ONCE
Status: COMPLETED | OUTPATIENT
Start: 2023-06-06 | End: 2023-06-06

## 2023-06-06 RX ADMIN — ZOLEDRONIC ACID 5 MG: 0.05 INJECTION, SOLUTION INTRAVENOUS at 13:50

## 2023-06-06 ASSESSMENT — PAIN SCALES - GENERAL
PAINLEVEL: NO PAIN (0)
PAINLEVEL: NO PAIN (0)

## 2023-06-06 NOTE — PATIENT INSTRUCTIONS
Patient Instructions  1. Continue to hydrate with 60-70 oz fluids daily.  2. Continue to exercise daily or most days of the week.  3. Follow up with your primary care provider for annual gender health maintenance procedures.  4. Follow up with colonoscopy schedule.  5. Follow up with annual dermatology visits.  6. It doesn't seem like the COVID vaccine is working well in lung transplant patients. A number of lung transplant patients have gotten sick with COVID even after receiving the vaccines. Based on our recent experience, it can be life-threatening to get COVID  even after being vaccinated. Please continue to act like you did not get the COVID vaccine - social distancing, wearing a mask, good hand hygiene, etc. If the people around you are vaccinated, it will help reduce the risk of you getting COVID. All members of your household should be vaccinated.  7. Increase your daily exercise.       Next transplant clinic appointment: 3 months with CXR, labs and PFTs  Next lab draw: pending tacrolimus level     AVS printed at time of check out      ~~~~~~~~~~~~~~~~~~~~~~~~~    Thoracic Transplant Office phone [phone], fax [phone]    Office Hours 8:30 - 5:00     For after-hours urgent issues, please dial [phone] and ask the  to page the Thoracic Transplant Coordinator On-Call.   --------------------  To expedite your medication refill(s), please contact your pharmacy and have them fax a refill request to: [phone]    *Please allow 3 business days for routine medication refills.  *Please allow 5 business days for controlled substance medication refills.    **For Diabetic medications and supplies refill(s), please contact your pharmacy and have them contact your Endocrine team.  --------------------  For scheduling appointments call [phone].  --------------------  Please Note: If you are active on [employer], all future test results will be sent by [employer] message only, and will no longer be  called to patient. You may also receive communication directly from your physician.

## 2023-06-06 NOTE — NURSING NOTE
Chief Complaint   Patient presents with     Blood Draw     Labs drawn with piv start by rn.  VS taken.     Labs drawn with PIV start by rn.  Pt tolerated well.  VS taken and pt checked in for next appt.    Natacha Valentin RN

## 2023-06-06 NOTE — PROGRESS NOTES
Transplant Coordinator Note    Reason for visit: Post lung transplant follow up visit   Coordinator: Present   Caregiver:  None    Health concerns addressed today:  1. Resp: PFTs stable, echo looks okay, pt continues to be SOB. Needs to exercise.   2. ENT: At baseline.  3. GI: At baseline. No nausea.   4. Working full time, doing okay with this.       Dermatology?    Activity/rehab: up ad [place], walking around the house.   Oxygen needs: room air  Pain management/RX: joint/bone pain (wrist and fingers), sees rheumatology next week. Some swelling. Using Volteran Gel.   Diabetic management: on lantus, occasional novolog  Next Bronch due: 9/13/20222  CMV status: D-/R-  EBV status: D+/R+  AC/asa: on coumadin, bridged to Lovenox for bronch (lower dose per [facility] d/t bleeding post bronch x 2)  PJP prophylactic: Bactri    COVID:  1. COVID-19 infection (yes/no, date of most recent positive test):   2. Status/instructions given about COVID-19 vaccine:     Pt Education: medications (use/dose/side effects), how/when to call coordinator, frequency of labs, s/s of infection/rejection, call prior to starting any new medications, lab/vital sign book    Health Maintenance:     Last colonoscopy: 7/2020    Next colonoscopy due: 3-5 years, 7/2023-7/2025    Dermatology:    Vaccinations this visit:     Labs, CXR, PFTs reviewed with patient  Medication record reviewed and reconciled  Questions and concerns addressed    Patient Instructions  1. Continue to hydrate with 60-70 oz fluids daily.  2. Continue to exercise daily or most days of the week.  3. Follow up with your primary care provider for annual gender health maintenance procedures.  4. Follow up with colonoscopy schedule.  5. Follow up with annual dermatology visits.  6. It doesn't seem like the COVID vaccine is working well in lung transplant patients. A number of lung transplant patients have gotten sick with COVID even after receiving the vaccines. Based on our recent experience,  it can be life-threatening to get COVID  even after being vaccinated. Please continue to act like you did not get the COVID vaccine - social distancing, wearing a mask, good hand hygiene, etc. If the people around you are vaccinated, it will help reduce the risk of you getting COVID. All members of your household should be vaccinated.  7. Increase your daily exercise.       Next transplant clinic appointment: 3 months with CXR, labs and PFTs  Next lab draw: pending tacrolimus level     AVS printed at time of check out

## 2023-06-06 NOTE — NURSING NOTE
Chief Complaint   Patient presents with     RECHECK     Return visit.     Blood pressure 122/73, pulse 67, temperature 97.9  F (36.6  C), weight 67.6 kg (149 lb), SpO2 96 %.    ROMEL ACEVEDO

## 2023-06-06 NOTE — PROGRESS NOTES
Reason for Visit  Edson Thornton Jr. is a 58 year old year old male who is being seen for RECHECK (Return visit.)      Assessment and plan:   Edson Thornton is a 58 year old male with NSIP/ILD, bronchiectasis, moderate PH, RA, SALTY, chronic HSV infection, hypogammaglobulinemia, steroid-induced diabetes, hypothyroidism, PFO, HTN, HLD, duodenal anomaly, anxiety, and depression. Admitted on 9/5/21 from OSH for acute on chronic respiratory failure 2/2 ILD exacerbation now s/p BSLT on 10/16/21. Prolonged vent wean s/p trach and PEG/J tube.  Post-op course also complicated by encephalopathy and diffuse weakness, acute to subacute CVA, afib with RVR, BRENNAN, GI bleed, Candidemia/Candida empyema, and anxiety. Code called 11/2 for bradycardia/asystole, progressive hypotension, found to have significant GI bleed, EGD with adherent clot near PEG tube site that was clipped.  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 His exercise tolerance and PFTs have not reached the level expected.    Pulmonary/lung transplant: The patient had a recent infection that responded within a day to levofloxacin. He has been experiencing persistent dyspnea with exertion. His PFT's remain stable from march, and he is oxygenating adequately at 96%.  Chest CT reviewed by me and c/w 3/38/23 with resolution of the LLL nodular opacities and LLL infiltrate noted on outside abd CT from5/31.  The patient will continue his current course of levofloxacin for the recent LLL pneumonia. Tacrolimus will be adjusted to maintain a level of 8-10. Continue current prednisone. The patient has recently changed insurance providers which does not cover myfortic. Previous use of cellcept caused the patient diarrhea. We will submit a prior auth to try to obtain coverage for myfortic, required immunosuppression for his lung transplant.  Etiology of the ongoing MYERS is unclear.  Echocardiogram this AM showed normal LV fxn and no significant valve abnormalities. He was encouraged to continue his exercise and conditioning regiment.       Positive crossmatch: The patient had a retrospective positive crossmatch following transplantation, attributed to rituximab received in June 2022.  Subsequent DSA is positive, waxing and waning but consistently below the MFI threshold for treatment.   Date DSA mfi Biopsy (date) Treatment   10/17/2021  none         10/23/2021  none         11/1/2021  none         11/15/2021  none         11/29/2021  DQ B5  2522       12/6/2021  DQ B5  1873       12/12/2021  DQ B5  1703       12/27/2021  DQ B5  3924       1/3/2022  DQ B5  3072       1/10/2022  DQ B5  4032       1/17/2022  DQB5  1849       2/3/2022 DQB5   2488       2/7/2022 DQB5  1874       2/10/2022 DQB5  1781       3/15/2022 DQB5  2254       3/21/2022 DQB5  2117       4/18/2022 DQB5   908       6/20/2022  DQB5  1100       6/29/2022  DQB5  1411       9/12/2022 DQB5  1681       11/14/2022 DQB5  891       12/20/2022  DQ B5  1553       3/8/2023  DQ B5  551       4/25/2023 DQ B5 None               Infectious disease: The patient has completed a course of Augmentin for Rothia identified during recent hospitalization.  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  Voriconazole level was below goal, the dose was increased to 250 mg twice daily. Lung nodules have resolved. IgE 5.  Galactomannan has turned (-) but fungitell is still (+).  He will complete at least a 3-month course.    Pulmonary embolism: There was a question of small right upper lobe PE on the outside CT at the time of his COVID infection.  On review with radiology at [place] it was felt this may represent artifact rather than failure of anticoagulation.  Continue chronic anticoagulation as previous (see below).    Prophylaxis: Continue Bactrim for PJP and nocardia prophylaxis. CMV  -/-.     Hypogammaglobulinemia: IgG adequate at 502 on 1/3.  Repeat IgG low at 168 on 3/7, s/p IVIG on 3/8. Repeated IgG on 3/28 adequate at 650. Continue monitoring.    Ophthalmology: History of double vision and visual loss.  Defer to ophthalmology for additional follow-up.    Rheumatoid arthritis: The patient reports some hand stiffness but generally adequately controlled with Voltaren gel and current immunosuppression.     Atrial fibrillation with RVR: Patient appears to be in sinus rhythm on exam today.  Continue propranolol and warfarin.     Obstructive sleep apnea: Continue Bi-PAP.     Hypertension: Blood pressure adequately controlled with current amlodipine and propranolol. EKG showed non concerning left ventricle thickening.     Depression/anxiety: Adequately controlled with Cymbalta.     Reflux/GI bleed: Continue pantoprazole.     Steroid-induced diabetes: Continue current insulin regimen.     Hypothyroidism: Continue current levothyroxine.    Multiple acute/subacute ischemic strokes: etiology likely embolic vs perioperative. MRI brain 10/23 with infarcts noted in both cerebral hemispheres, left cerebellum, presumed associated with new Afib vs perioperative. Bilateral upper extremity paresis (R>L), suspicion for some cortical blindness. SBP goal < 140. Continue warfarin, HTN and BS control and rosuvastatin.     Bone health: Reclast per endocrinology recommendations.     CKD: History of  stage IIIa CKD. Creatininie is at the low end of his recent range. The patient was encouraged to maintain adequate hydration.  He requires a therapeutic dose of tacrolimus but should avoid other nephrotoxins.    Psychosocial: The patient started a 40hr/week position as a  with the [employer] two weeks ago. 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      Healthcare maintenance: The patient is up to date with COVID vaccination and  influenza vaccination.  Annual studies will be due in November.    Follow up in 3 months with labs, xrays, PFT's  Last colonoscopy: 7/21/2020  I, Abiodun Woods, have spent 45 minutes on the day of the visit to review the chart, interview and examine the patient, review labs and imaging, formulate a plan, document and submit orders. Time documented is excluding time spent for PFT interpretation.     Abiodun Woods MD   Lung TX HPI  Transplants:  10/16/2021 (Lung), Postoperative day:  598    The patient was seen and examined by Abiodun Woods MD.    Edson Thornton is a 58 year old male with NSIP/ILD, bronchiectasis, moderate PH, RA, SALTY, chronic HSV infection, hypogammaglobulinemia, steroid-induced diabetes, hypothyroidism, PFO, HTN, HLD, duodenal anomaly, anxiety, and depression. Admitted on 9/5/21 from OSH for acute on chronic respiratory failure 2/2 ILD exacerbation now s/p BSLT on 10/16/21. Prolonged vent wean s/p trach and PEG/J tube.  Post-op course also complicated by encephalopathy and diffuse weakness, acute to subacute CVA, afib with RVR, BRENNAN, GI bleed, Candidemia/Candida empyema, and anxiety. Code called 11/2 for bradycardia/asystole, progressive hypotension, found to have significant GI bleed, EGD with adherent clot near PEG tube site that was clipped.  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    The patient reports he has not been feeling well since 5/31. He had abdominal distension, dizzyness, headache, nausea, vomiting, and shortness of breath. He reports an increased cough frequency with no sputum. Associated 99.1 degree fever. No associated chills or chest pain. Baseline night sweats. He was evaluated in local clinic including chest CT (reviewed by me) with new LLL pneumonia. All symptoms subsided within one day of initiating levofloxacin. He had  been doing 1 mile on the treadmill 2-3x a week prior to getting sick and parents coming into town.     Breathing is comfortable at rest, dyspnea with one block on flat ground.. No chest pain, fever, or chills. Baseline night sweats.        Review of Systems   Appetite is fair (lifelong)  No ear pain, sore throat, sinus pain  No vision changes  No nausea, vomiting, diarrhea or abdominal pain.  No swollen lymph nodes  Bilateral hand arthritis   Easy bruising       A complete ROS was otherwise negative except as noted in the HPI.    Recently started working full time as a  in [employer].    Current Outpatient Medications   Medication     mirabegron (MYRBETRIQ) 50 MG 24 hr tablet     acetaminophen (TYLENOL) 325 MG tablet     amLODIPine (NORVASC) 10 MG tablet     calcium carbonate 600 mg-vitamin D 400 units 600-400 MG-UNIT per tablet     diclofenac (VOLTAREN) 1 % topical gel     FLUoxetine (PROZAC) 20 MG capsule     fluticasone (FLONASE) 50 MCG/ACT nasal spray     fluticasone-salmeterol (ADVAIR) 500-50 MCG/ACT inhaler     hydroxychloroquine (PLAQUENIL) 200 MG tablet     insulin aspart (NOVOLOG PEN) 100 UNIT/ML pen     insulin pen needle (32G X 4 MM) 32G X 4 MM miscellaneous     lamoTRIgine (LAMICTAL) 25 MG tablet     levalbuterol (XOPENEX HFA) 45 MCG/ACT inhaler     levofloxacin (LEVAQUIN) 750 MG tablet     levothyroxine (SYNTHROID/LEVOTHROID) 25 MCG tablet     Magnesium Glycinate POWD     Melatonin 10 MG TABS tablet     mirtazapine (REMERON) 7.5 MG tablet     multivitamin w/minerals (THERA-VIT-M) tablet     MYFORTIC (BRAND) 360 MG EC tablet     ondansetron (ZOFRAN-ODT) 4 MG ODT tab     pantoprazole (PROTONIX) 40 MG EC tablet     predniSONE (DELTASONE) 2.5 MG tablet     predniSONE (DELTASONE) 5 MG tablet     propranolol (INDERAL) 20 MG tablet     rOPINIRole (REQUIP) 0.25 MG tablet     rosuvastatin (CRESTOR) 10 MG tablet     senna-docusate (SENOKOT-S/PERICOLACE) 8.6-50 MG tablet      sulfamethoxazole-trimethoprim (BACTRIM) 400-80 MG tablet     tacrolimus (GENERIC EQUIVALENT) 1 mg/mL suspension     tamsulosin (FLOMAX) 0.4 MG capsule     voriconazole (VFEND) 200 MG tablet     voriconazole (VFEND) 50 MG tablet     warfarin ANTICOAGULANT (COUMADIN) 1 MG tablet     No current facility-administered medications for this visit.     No Known Allergies  Past Medical History:   Diagnosis Date     BRENNAN (acute kidney injury) (H) 10/17/2021     Anxiety      Depression      HLD (hyperlipidemia)      HTN (hypertension)      Hypothyroidism      ILD (interstitial lung disease) (H)      Infection due to 2019 novel coronavirus 03/2023     SALTY on CPAP      Oxygen dependent     BL 4L since ~6/2021     Primary hypertension 02/28/2022     Rheumatoid arthritis (H)     signs ~5/2020, dx 5/2021     S/P lung transplant (H) 10/16/2021     Shock liver 10/17/2021     Steroid-induced hyperglycemia      Traction bronchiectasis (H)        Past Surgical History:   Procedure Laterality Date     BRONCHOSCOPY (RIGID OR FLEXIBLE), DIAGNOSTIC N/A 1/26/2022    Procedure: BRONCHOSCOPY, WITH BRONCHOALVEOLAR LAVAGE AND BIOPSY;  Surgeon: Perlman, David Morris, MD;  [facility]     BRONCHOSCOPY (RIGID OR FLEXIBLE), DIAGNOSTIC N/A 4/19/2022    Procedure: BRONCHOSCOPY, WITH BRONCHOALVEOLAR LAVAGE AND BIOPSY;  Surgeon: Sherine Merrill MD;  [facility]     BRONCHOSCOPY (RIGID OR FLEXIBLE), DIAGNOSTIC N/A 6/21/2022    Procedure: BRONCHOSCOPY, WITH BRONCHOALVEOLAR LAVAGE AND BIOPSY;  Surgeon: Aneesh Rubio MD;  [facility]     COLONOSCOPY W/ BIOPSIES AND POLYPECTOMY  07/21/2020     CV CORONARY ANGIOGRAM N/A 09/08/2021    Procedure: Coronary Angiogram with possible intervention;  Surgeon: Jovon Bullock MD;  Location:  [facility]     CV RIGHT HEART CATH MEASUREMENTS RECORDED N/A 09/08/2021    Procedure: Right Heart Cath;  Surgeon: Jovon Bullock MD;  Location:  [facility]     ESOPHAGOSCOPY,  GASTROSCOPY, DUODENOSCOPY (EGD), COMBINED N/A 10/23/2021    Procedure: ESOPHAGOGASTRODUODENOSCOPY (EGD);  Surgeon: Miquel Pisano MD;  [facility]     ESOPHAGOSCOPY, GASTROSCOPY, DUODENOSCOPY (EGD), COMBINED N/A 11/02/2021    Procedure: ESOPHAGOGASTRODUODENOSCOPY (EGD);  Surgeon: Daniel Ortiz MD;  [facility]     ESOPHAGOSCOPY, GASTROSCOPY, DUODENOSCOPY (EGD), COMBINED N/A 11/5/2021    Procedure: ESOPHAGOGASTRODUODENOSCOPY (EGD);  Surgeon: Ronnell Hernandez MD;  [facility]     EXTRACTION(S) DENTAL N/A 09/22/2021    Procedure: EXTRACTION tooth #19;  Surgeon: Deepak Tobin DDS;  [facility]     HERNIA REPAIR       IR CHEST TUBE PLACEMENT NON-TUNNELLED LEFT  11/03/2021     PICC TRIPLE LUMEN PLACEMENT Left 11/04/2021    5FR TL PICC. Right non occlusive thrombus subclavian vein.     REPLACE GASTROJEJUNOSTOMY TUBE, PERCUTANEOUS N/A 11/9/2021    Procedure: REPLACEMENT, GASTROJEJUNOSTOMY TUBE, PERCUTANEOUS;  Surgeon: Hernando Rodriguez MD;  [facility]     right acl       TRACHEOSTOMY PERCUTANEOUS N/A 10/29/2021    Procedure: Percutaneous Tracheostomy,;  Surgeon: Celine Jenkins MD;  [facility]     TRANSPLANT LUNG RECIPIENT SINGLE X2 Bilateral 10/16/2021    Procedure: TRANSPLANT, LUNG, RECIPIENT, BILATERAL, Bronchoscopy, on-pump perfusion, bilateral clamshell sternotomy;  Surgeon: Yanick Corral MD;  [facility]     XR ACROMIOCLAVICULAR JOINT BILATERAL         Social History     Socioeconomic History     Marital status: Single     Spouse name: Not on file     Number of children: Not on file     Years of education: Not on file     Highest education level: Not on file   Occupational History     Not on file   Tobacco Use     Smoking status: Never     Smokeless tobacco: Never   Vaping Use     Vaping status: Not on file   Substance and Sexual Activity     Alcohol use: Never     Drug use: Never     Sexual activity: Not on file   Other Topics Concern      Parent/sibling w/ CABG, MI or angioplasty before 65F 55M? Not Asked   Social History Narrative    Full time  for the [employer] starting May 24, 2023.     Social Determinants of Health     Financial Resource Strain: Not on file   Food Insecurity: Not on file   Transportation Needs: Not on file   Physical Activity: Not on file   Stress: Not on file   Social Connections: Not on file   Intimate Partner Violence: Not on file   Housing Stability: Not on file         /73   Pulse 67   Temp 97.9  F (36.6  C)   Wt 67.6 kg (149 lb)   SpO2 96%   BMI 25.58 kg/m    Body mass index is 25.58 kg/m .  Exam:   GENERAL APPEARANCE: Well developed, well nourished, alert, and in no apparent distress.  EYES: PERRL, EOMI  HENT: Nasal mucosa with no edema and no hyperemia. No nasal polyps.  EARS: Canals clear, TMs normal  MOUTH: Oral mucosa is moist, without any lesions, no tonsillar enlargement, no oropharyngeal exudate.  NECK: supple, no masses, no thyromegaly.  LYMPHATICS: No significant axillary, cervical, or supraclavicular nodes.  RESP: normal percussion, good air flow throughout.  No crackles. No rhonchi. Faint wheeze left base   CV: Normal S1, S2, regular rhythm, normal rate. No murmur.  No rub. No gallop. No LE edema.   ABDOMEN:  Bowel sounds normal, soft, nontender, no HSM or masses.   MS: extremities normal. No clubbing. No cyanosis.  SKIN: no rash on limited exam  NEURO: Mentation intact, speech normal, normal strength and tone, normal gait and stance  PSYCH: mentation appears normal. and affect normal/bright  Results:  Recent Results (from the past 168 hour(s))   INR    Collection Time: 06/02/23  7:05 AM   Result Value Ref Range    PT - Prothrombine Time (External) 21.8 (H) 12.0 - 14.5 secs    INR (External) 1.9 (H) 0.9 - 1.1 INR   Basic metabolic panel    Collection Time: 06/02/23  7:05 AM   Result Value Ref Range    Glucose (External) 107 (H) 70 - 99 mg/dL    Urea Nitrogen (External) 26 (H) 6 - 22  mg/dL    Creatinine (External) 1.26 (H) 0.73 - 1.18 mg/dL    BUN/Creatinine Ratio (External) 20.6 10.0 - 25.0 Ratio    Sodium (External) 141 136 - 145 meq/L    Potassium (External) 4.8 3.5 - 5.1 meq/L    Chloride (External) 108 98 - 109 meq/L    CO2 (External) 24 22 - 31 meq/L    Anion Gap (External) 14 6 - 20 meq/L    Calcium (External) 10.1 8.5 - 10.5 mg/dL    GFR Estimated (External) 66 >=60 ml/min/1.73m2   CBC with Platelets & Differential    Collection Time: 06/02/23  7:05 AM   Result Value Ref Range    WBC Count (External) 6.1 4.0 - 11.0 K/uL    RBC Count (External) 4.42 4.40 - 5.80 M/UL    Hemoglobin (External) 10.6 (L) 13.5 - 17.5 g/dL    Hematocrit (External) 35.2 (L) 40.0 - 50.0 %    MCV (External) 79.6 (L) 80.0 - 98.0 fL    MCH (External) 24.0 (L) 25.5 - 34.0 pg    MCHC (External) 30.1 (L) 31.5 - 36.5 g/dL    RDW (External) 15.6 (H) 11.5 - 15.5 %    Platelet Count (External) 178 140 - 400 K/uL    Absolute Neutrophils (External) 3.9 1.8 - 8.0 K/uL    Absolute Lymphocytes (External) 1.2 0.8 - 4.1 K/uL    Absolute Monocytes (External) 0.7 0.0 - 1.0 K/uL    Absolute Eosinophils (External) 0.1 0.0 - 0.7 K/uL    Absolute Basophils (External) 0.0 0.0 - 0.2 K/uL    Absolute Immature Granulocytes (External) 0.11 (H) 0.00 - 0.06 K/uL    % Neutrophils (External) 64.2 %    % Lymphocytes (External) 19.3 %    % Monocytes (External) 12.2 %    % Immature Granulocytes (External) 1.8 %    % Eosinophils (External) 2.0 %    % Basophils (External) 0.5 %    Nucleated RBCs (External) 0 0 - 1 /100 WBC    Method (External) Auto    Tacrolimus by [employer] Mass Spectrometry    Collection Time: 06/02/23  7:05 AM   Result Value Ref Range    Tacrolimus by Tandem Mass Spectrometry 10.6 5.0 - 15.0 ug/L    Tacrolimus Last Dose Date 6/1/2023     Tacrolimus Last Dose Time  8:20 PM    Echocardiogram Complete    Collection Time: 06/06/23  8:21 AM   Result Value Ref Range    LVEF  55-60%    Creatinine    Collection Time: 06/06/23  9:48 AM    Result Value Ref Range    Creatinine 1.45 (H) 0.67 - 1.17 mg/dL    GFR Estimate 56 (L) >60 mL/min/1.73m2   Calcium    Collection Time: 06/06/23  9:48 AM   Result Value Ref Range    Calcium 9.8 8.6 - 10.0 mg/dL   CBC with platelets    Collection Time: 06/06/23  9:48 AM   Result Value Ref Range    WBC Count 7.5 4.0 - 11.0 10e3/uL    RBC Count 4.58 4.40 - 5.90 10e6/uL    Hemoglobin 11.0 (L) 13.3 - 17.7 g/dL    Hematocrit 35.9 (L) 40.0 - 53.0 %    MCV 78 78 - 100 fL    MCH 24.0 (L) 26.5 - 33.0 pg    MCHC 30.6 (L) 31.5 - 36.5 g/dL    RDW 15.7 (H) 10.0 - 15.0 %    Platelet Count 196 150 - 450 10e3/uL   Magnesium    Collection Time: 06/06/23  9:48 AM   Result Value Ref Range    Magnesium 1.7 1.7 - 2.3 mg/dL   General PFT Lab (Please always keep checked)    Collection Time: 06/06/23 10:17 AM   Result Value Ref Range    FVC-Pred 3.57 L    FVC-Pre 2.32 L    FVC-%Pred-Pre 64 %    FEV1-Pre 1.47 L    FEV1-%Pred-Pre 51 %    FEV1FVC-Pred 80 %    FEV1FVC-Pre 64 %    FEFMax-Pred 8.17 L/sec    FEFMax-Pre 5.03 L/sec    FEFMax-%Pred-Pre 61 %    FEF2575-Pred 2.57 L/sec    FEF2575-Pre 0.70 L/sec    RIY5285-%Pred-Pre 27 %    ExpTime-Pre 8.59 sec    FIFMax-Pre 4.48 L/sec    FEV1FEV6-Pred 79 %    FEV1FEV6-Pre 65 %         Results as noted above.    PFT Interpretation:  Moderate obstructive ventilatory defect.  Unchanged from previous.   Below recent best, but similar to baseline.  Valid Maneuver        Scribe Disclosure:   I, Obed Toribio, am serving as a scribe; to document services personally performed by Dr. Abiodun Woods- -based on data collection and the provider's statements to me.     Provider Disclosure:  I agree with above History, Review of Systems, Physical exam and Plan.  I have reviewed the content of the documentation and have edited it as needed. I have personally performed the services documented here and the documentation accurately represents those services and the decisions I have made.      Electronically signed  by:  Dr. Abiodun Woods

## 2023-06-06 NOTE — LETTER
6/6/2023         RE: Edson Thornton Jr.  [address]  [place] [address]        Dear Colleague,    Thank you for referring your patient, Edson Thornton Jr., to the [facility]. Please see a copy of my visit note below.    Transplant Coordinator Note    Reason for visit: Post lung transplant follow up visit   Coordinator: Present   Caregiver:  None    Health concerns addressed today:  1. Resp: PFTs stable, echo looks okay, pt continues to be SOB. Needs to exercise.   2. ENT: At baseline.  3. GI: At baseline. No nausea.   4. Working full time, doing okay with this.       Dermatology?    Activity/rehab: up ad [place], walking around the house.   Oxygen needs: room air  Pain management/RX: joint/bone pain (wrist and fingers), sees rheumatology next week. Some swelling. Using Volteran Gel.   Diabetic management: on lantus, occasional novolog  Next Bronch due: 9/13/20222  CMV status: D-/R-  EBV status: D+/R+  AC/asa: on coumadin, bridged to Lovenox for bronch (lower dose per CrCl d/t bleeding post bronch x 2)  PJP prophylactic: Bactri    COVID:  1. COVID-19 infection (yes/no, date of most recent positive test):   2. Status/instructions given about COVID-19 vaccine:     Pt Education: medications (use/dose/side effects), how/when to call coordinator, frequency of labs, s/s of infection/rejection, call prior to starting any new medications, lab/vital sign book    Health Maintenance:     Last colonoscopy: 7/2020    Next colonoscopy due: 3-5 years, 7/2023-7/2025    Dermatology:    Vaccinations this visit:     Labs, CXR, PFTs reviewed with patient  Medication record reviewed and reconciled  Questions and concerns addressed    Patient Instructions  1. Continue to hydrate with 60-70 oz fluids daily.  2. Continue to exercise daily or most days of the week.  3. Follow up with your primary care provider for annual gender health maintenance procedures.  4. Follow up with colonoscopy schedule.  5.  Follow up with annual dermatology visits.  6. It doesn't seem like the COVID vaccine is working well in lung transplant patients. A number of lung transplant patients have gotten sick with COVID even after receiving the vaccines. Based on our recent experience, it can be life-threatening to get COVID  even after being vaccinated. Please continue to act like you did not get the COVID vaccine - social distancing, wearing a mask, good hand hygiene, etc. If the people around you are vaccinated, it will help reduce the risk of you getting COVID. All members of your household should be vaccinated.  7. Increase your daily exercise.       Next transplant clinic appointment: 3 months with CXR, labs and PFTs  Next lab draw: pending tacrolimus level     AVS printed at time of check out          Reason for Visit  Edson Thornton Jr. is a 58 year old year old male who is being seen for RECHECK (Return visit.)      Assessment and plan:   Edson Thornton is a 58 year old male with NSIP/ILD, bronchiectasis, moderate PH, RA, SALTY, chronic HSV infection, hypogammaglobulinemia, steroid-induced diabetes, hypothyroidism, PFO, HTN, HLD, duodenal anomaly, anxiety, and depression. Admitted on 9/5/21 from OSH for acute on chronic respiratory failure 2/2 ILD exacerbation now s/p BSLT on 10/16/21. Prolonged vent wean s/p trach and PEG/J tube.  Post-op course also complicated by encephalopathy and diffuse weakness, acute to subacute CVA, afib with RVR, BRENNAN, GI bleed, Candidemia/Candida empyema, and anxiety. Code called 11/2 for bradycardia/asystole, progressive hypotension, found to have significant GI bleed, EGD with adherent clot near PEG tube site that was clipped.  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 His exercise tolerance and PFTs have not reached the level expected.    Pulmonary/lung transplant:  The patient had a recent infection that responded within a day to levofloxacin. He has been experiencing persistent dyspnea with exertion. His PFT's remain stable from march, and he is oxygenating adequately at 96%.  Chest CT reviewed by me and c/w 3/38/23 with resolution of the LLL nodular opacities and LLL infiltrate noted on outside abd CT from5/31.  The patient will continue his current course of levofloxacin for the recent LLL pneumonia. Tacrolimus will be adjusted to maintain a level of 8-10. Continue current prednisone. The patient has recently changed insurance providers which does not cover myfortic. Previous use of cellcept caused the patient diarrhea. We will submit a prior auth to try to obtain coverage for myfortic, required immunosuppression for his lung transplant. Etiology of the ongoing MYERS is unclear.  Echocardiogram this AM showed normal LV fxn and no significant valve abnormalities. He was encouraged to continue his exercise and conditioning regiment.       Positive crossmatch: The patient had a retrospective positive crossmatch following transplantation, attributed to rituximab received in June 2022.  Subsequent DSA is positive, waxing and waning but consistently below the MFI threshold for treatment.   Date DSA mfi Biopsy (date) Treatment   10/17/2021  none         10/23/2021  none         11/1/2021  none         11/15/2021  none         11/29/2021  DQ B5  2522       12/6/2021  DQ B5  1873       12/12/2021  DQ B5  1703       12/27/2021  DQ B5  3924       1/3/2022  DQ B5  3072       1/10/2022  DQ B5  4032       1/17/2022  DQB5  1849       2/3/2022 DQB5   2488       2/7/2022 DQB5  1874       2/10/2022 DQB5  1781       3/15/2022 DQB5  2254       3/21/2022 DQB5  2117       4/18/2022 DQB5   908       6/20/2022  DQB5  1100       6/29/2022  DQB5  1411       9/12/2022 DQB5  1681       11/14/2022 DQB5  891       12/20/2022  DQ B5  1553       3/8/2023  DQ B5  551       4/25/2023 DQ B5 None                Infectious disease: The patient has completed a course of Augmentin for Rothia identified during recent hospitalization.  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  Voriconazole level was below goal, the dose was increased to 250 mg twice daily. Lung nodules have resolved. IgE 5.  Galactomannan has turned (-) but fungitell is still (+).  He will complete at least a 3-month course.    Pulmonary embolism: There was a question of small right upper lobe PE on the outside CT at the time of his COVID infection.  On review with radiology at [facility] it was felt this may represent artifact rather than failure of anticoagulation.  Continue chronic anticoagulation as previous (see below).    Prophylaxis: Continue Bactrim for PJP and nocardia prophylaxis. CMV -/-.     Hypogammaglobulinemia: IgG adequate at 502 on 1/3.  Repeat IgG low at 168 on 3/7, s/p IVIG on 3/8. Repeated IgG on 3/28 adequate at 650. Continue monitoring.    Ophthalmology: History of double vision and visual loss.  Defer to ophthalmology for additional follow-up.    Rheumatoid arthritis: The patient reports some hand stiffness but generally adequately controlled with Voltaren gel and current immunosuppression.     Atrial fibrillation with RVR: Patient appears to be in sinus rhythm on exam today.  Continue propranolol and warfarin.     Obstructive sleep apnea: Continue Bi-PAP.     Hypertension: Blood pressure adequately controlled with current amlodipine and propranolol. EKG showed non concerning left ventricle thickening.     Depression/anxiety: Adequately controlled with Cymbalta.     Reflux/GI bleed: Continue pantoprazole.     Steroid-induced diabetes: Continue current insulin regimen.     Hypothyroidism: Continue current levothyroxine.    Multiple acute/subacute ischemic strokes: etiology likely embolic vs perioperative. MRI brain 10/23 with infarcts noted in both cerebral hemispheres, left cerebellum, presumed associated with new Afib vs  perioperative. Bilateral upper extremity paresis (R>L), suspicion for some cortical blindness. SBP goal < 140. Continue warfarin, HTN and BS control and rosuvastatin.     Bone health: Reclast per endocrinology recommendations.     CKD: History of  stage IIIa CKD. Creatininie is at the low end of his recent range. The patient was encouraged to maintain adequate hydration.  He requires a therapeutic dose of tacrolimus but should avoid other nephrotoxins.    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      Healthcare maintenance: The patient is up to date with COVID vaccination and influenza vaccination.  Annual studies will be due in November.    Follow up in 3 months with labs, xrays, PFT's  Last colonoscopy: 7/21/2020  I, Abiodun Woods, have spent 45 minutes on the day of the visit to review the chart, interview and examine the patient, review labs and imaging, formulate a plan, document and submit orders. Time documented is excluding time spent for PFT interpretation.     Abiodun Woods MD   Lung TX HPI  Transplants:  10/16/2021 (Lung), Postoperative day:  598    The patient was seen and examined by Abiodun Woods MD.    Edson Thornton is a 58 year old male with NSIP/ILD, bronchiectasis, moderate PH, RA, SALTY, chronic HSV infection, hypogammaglobulinemia, steroid-induced diabetes, hypothyroidism, PFO, HTN, HLD, duodenal anomaly, anxiety, and depression. Admitted on 9/5/21 from OSH for acute on chronic respiratory failure 2/2 ILD exacerbation now s/p BSLT on 10/16/21. Prolonged vent wean s/p trach and PEG/J tube.  Post-op course also complicated by encephalopathy and diffuse weakness, acute to subacute CVA, afib with RVR, BRENNAN, GI bleed, Candidemia/Candida empyema, and anxiety. Code called 11/2 for  bradycardia/asystole, progressive hypotension, found to have significant GI bleed, EGD with adherent clot near PEG tube site that was clipped.  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    The patient reports he has not been feeling well since 5/31. He had abdominal distension, dizzyness, headache, nausea, vomiting, and shortness of breath. He reports an increased cough frequency with no sputum. Associated 99.1 degree fever. No associated chills or chest pain. Baseline night sweats. He was evaluated in local clinic including chest CT (reviewed by me) with new LLL pneumonia. All symptoms subsided within one day of initiating levofloxacin. He had been doing 1 mile on the treadmill 2-3x a week prior to getting sick and parents coming into town.     Breathing is comfortable at rest, dyspnea with one block on flat ground.. No chest pain, fever, or chills. Baseline night sweats.        Review of Systems   Appetite is fair (lifelong)  No ear pain, sore throat, sinus pain  No vision changes  No nausea, vomiting, diarrhea or abdominal pain.  No swollen lymph nodes  Bilateral hand arthritis   Easy bruising       A complete ROS was otherwise negative except as noted in the HPI.    Recently started working full time as a  in [employer].    Current Outpatient Medications   Medication     mirabegron (MYRBETRIQ) 50 MG 24 hr tablet     acetaminophen (TYLENOL) 325 MG tablet     amLODIPine (NORVASC) 10 MG tablet     calcium carbonate 600 mg-vitamin D 400 units 600-400 MG-UNIT per tablet     diclofenac (VOLTAREN) 1 % topical gel     FLUoxetine (PROZAC) 20 MG capsule     fluticasone (FLONASE) 50 MCG/ACT nasal spray     fluticasone-salmeterol (ADVAIR) 500-50 MCG/ACT inhaler     hydroxychloroquine (PLAQUENIL) 200 MG tablet     insulin aspart (NOVOLOG PEN) 100 UNIT/ML pen     insulin  pen needle (32G X 4 MM) 32G X 4 MM miscellaneous     lamoTRIgine (LAMICTAL) 25 MG tablet     levalbuterol (XOPENEX HFA) 45 MCG/ACT inhaler     levofloxacin (LEVAQUIN) 750 MG tablet     levothyroxine (SYNTHROID/LEVOTHROID) 25 MCG tablet     Magnesium Glycinate POWD     Melatonin 10 MG TABS tablet     mirtazapine (REMERON) 7.5 MG tablet     multivitamin w/minerals (THERA-VIT-M) tablet     MYFORTIC (BRAND) 360 MG EC tablet     ondansetron (ZOFRAN-ODT) 4 MG ODT tab     pantoprazole (PROTONIX) 40 MG EC tablet     predniSONE (DELTASONE) 2.5 MG tablet     predniSONE (DELTASONE) 5 MG tablet     propranolol (INDERAL) 20 MG tablet     rOPINIRole (REQUIP) 0.25 MG tablet     rosuvastatin (CRESTOR) 10 MG tablet     senna-docusate (SENOKOT-S/PERICOLACE) 8.6-50 MG tablet     sulfamethoxazole-trimethoprim (BACTRIM) 400-80 MG tablet     tacrolimus (GENERIC EQUIVALENT) 1 mg/mL suspension     tamsulosin (FLOMAX) 0.4 MG capsule     voriconazole (VFEND) 200 MG tablet     voriconazole (VFEND) 50 MG tablet     warfarin ANTICOAGULANT (COUMADIN) 1 MG tablet     No current facility-administered medications for this visit.     No Known Allergies  Past Medical History:   Diagnosis Date     BRENNAN (acute kidney injury) (H) 10/17/2021     Anxiety      Depression      HLD (hyperlipidemia)      HTN (hypertension)      Hypothyroidism      ILD (interstitial lung disease) (H)      Infection due to 2019 novel coronavirus 03/2023     SALTY on CPAP      Oxygen dependent     BL 4L since ~6/2021     Primary hypertension 02/28/2022     Rheumatoid arthritis (H)     signs ~5/2020, dx 5/2021     S/P lung transplant (H) 10/16/2021     Shock liver 10/17/2021     Steroid-induced hyperglycemia      Traction bronchiectasis (H)        Past Surgical History:   Procedure Laterality Date     BRONCHOSCOPY (RIGID OR FLEXIBLE), DIAGNOSTIC N/A 1/26/2022    Procedure: BRONCHOSCOPY, WITH BRONCHOALVEOLAR LAVAGE AND BIOPSY;  Surgeon: Perlman, David Morris, MD;  Location:  GI      BRONCHOSCOPY (RIGID OR FLEXIBLE), DIAGNOSTIC N/A 4/19/2022    Procedure: BRONCHOSCOPY, WITH BRONCHOALVEOLAR LAVAGE AND BIOPSY;  Surgeon: Sherine Merrill MD;  Location:  GI     BRONCHOSCOPY (RIGID OR FLEXIBLE), DIAGNOSTIC N/A 6/21/2022    Procedure: BRONCHOSCOPY, WITH BRONCHOALVEOLAR LAVAGE AND BIOPSY;  Surgeon: Aneesh Ruibo MD;  Location:  GI     COLONOSCOPY W/ BIOPSIES AND POLYPECTOMY  07/21/2020     CV CORONARY ANGIOGRAM N/A 09/08/2021    Procedure: Coronary Angiogram with possible intervention;  Surgeon: Jovon Bullock MD;  Location:  HEART CARDIAC CATH LAB     CV RIGHT HEART CATH MEASUREMENTS RECORDED N/A 09/08/2021    Procedure: Right Heart Cath;  Surgeon: Jovon Bullock MD;  Location:  HEART CARDIAC CATH LAB     ESOPHAGOSCOPY, GASTROSCOPY, DUODENOSCOPY (EGD), COMBINED N/A 10/23/2021    Procedure: ESOPHAGOGASTRODUODENOSCOPY (EGD);  Surgeon: Miquel Pisano MD;  Location:  GI     ESOPHAGOSCOPY, GASTROSCOPY, DUODENOSCOPY (EGD), COMBINED N/A 11/02/2021    Procedure: ESOPHAGOGASTRODUODENOSCOPY (EGD);  Surgeon: Daniel Ortiz MD;  Location:  GI     ESOPHAGOSCOPY, GASTROSCOPY, DUODENOSCOPY (EGD), COMBINED N/A 11/5/2021    Procedure: ESOPHAGOGASTRODUODENOSCOPY (EGD);  Surgeon: Ronnell Hernandez MD;  Location:  GI     EXTRACTION(S) DENTAL N/A 09/22/2021    Procedure: EXTRACTION tooth #19;  Surgeon: Deepak Tobin DDS;  Location:  OR     HERNIA REPAIR       IR CHEST TUBE PLACEMENT NON-TUNNELLED LEFT  11/03/2021     PICC TRIPLE LUMEN PLACEMENT Left 11/04/2021    5FR TL PICC. Right non occlusive thrombus subclavian vein.     REPLACE GASTROJEJUNOSTOMY TUBE, PERCUTANEOUS N/A 11/9/2021    Procedure: REPLACEMENT, GASTROJEJUNOSTOMY TUBE, PERCUTANEOUS;  Surgeon: Hernando Rodriguez MD;  Location:  GI     right acl       TRACHEOSTOMY PERCUTANEOUS N/A 10/29/2021    Procedure: Percutaneous Tracheostomy,;  Surgeon: Celine Jenkins MD;  Location: [facility] OR     TRANSPLANT  LUNG RECIPIENT SINGLE X2 Bilateral 10/16/2021    Procedure: TRANSPLANT, LUNG, RECIPIENT, BILATERAL, Bronchoscopy, on-pump perfusion, bilateral clamshell sternotomy;  Surgeon: Yanick Corral MD;  Location: [facility] OR     XR ACROMIOCLAVICULAR JOINT BILATERAL         Social History     Socioeconomic History     Marital status: Single     Spouse name: Not on file     Number of children: Not on file     Years of education: Not on file     Highest education level: Not on file   Occupational History     Not on file   Tobacco Use     Smoking status: Never     Smokeless tobacco: Never   Vaping Use     Vaping status: Not on file   Substance and Sexual Activity     Alcohol use: Never     Drug use: Never     Sexual activity: Not on file   Other Topics Concern     Parent/sibling w/ CABG, MI or angioplasty before 65F 55M? Not Asked   Social History Narrative    Full time  for the [employer] starting May 24, 2023.     Social Determinants of Health     Financial Resource Strain: Not on file   Food Insecurity: Not on file   Transportation Needs: Not on file   Physical Activity: Not on file   Stress: Not on file   Social Connections: Not on file   Intimate Partner Violence: Not on file   Housing Stability: Not on file         /73   Pulse 67   Temp 97.9  F (36.6  C)   Wt 67.6 kg (149 lb)   SpO2 96%   BMI 25.58 kg/m    Body mass index is 25.58 kg/m .  Exam:   GENERAL APPEARANCE: Well developed, well nourished, alert, and in no apparent distress.  EYES: PERRL, EOMI  HENT: Nasal mucosa with no edema and no hyperemia. No nasal polyps.  EARS: Canals clear, TMs normal  MOUTH: Oral mucosa is moist, without any lesions, no tonsillar enlargement, no oropharyngeal exudate.  NECK: supple, no masses, no thyromegaly.  LYMPHATICS: No significant axillary, cervical, or supraclavicular nodes.  RESP: normal percussion, good air flow throughout.  No crackles. No rhonchi. Faint wheeze left base   CV: Normal S1, S2,  regular rhythm, normal rate. No murmur.  No rub. No gallop. No LE edema.   ABDOMEN:  Bowel sounds normal, soft, nontender, no HSM or masses.   MS: extremities normal. No clubbing. No cyanosis.  SKIN: no rash on limited exam  NEURO: Mentation intact, speech normal, normal strength and tone, normal gait and stance  PSYCH: mentation appears normal. and affect normal/bright  Results:  Recent Results (from the past 168 hour(s))   INR    Collection Time: 06/02/23  7:05 AM   Result Value Ref Range    PT - Prothrombine Time (External) 21.8 (H) 12.0 - 14.5 secs    INR (External) 1.9 (H) 0.9 - 1.1 INR   Basic metabolic panel    Collection Time: 06/02/23  7:05 AM   Result Value Ref Range    Glucose (External) 107 (H) 70 - 99 mg/dL    Urea Nitrogen (External) 26 (H) 6 - 22 mg/dL    Creatinine (External) 1.26 (H) 0.73 - 1.18 mg/dL    BUN/Creatinine Ratio (External) 20.6 10.0 - 25.0 Ratio    Sodium (External) 141 136 - 145 meq/L    Potassium (External) 4.8 3.5 - 5.1 meq/L    Chloride (External) 108 98 - 109 meq/L    CO2 (External) 24 22 - 31 meq/L    Anion Gap (External) 14 6 - 20 meq/L    Calcium (External) 10.1 8.5 - 10.5 mg/dL    GFR Estimated (External) 66 >=60 ml/min/1.73m2   CBC with Platelets & Differential    Collection Time: 06/02/23  7:05 AM   Result Value Ref Range    WBC Count (External) 6.1 4.0 - 11.0 K/uL    RBC Count (External) 4.42 4.40 - 5.80 M/UL    Hemoglobin (External) 10.6 (L) 13.5 - 17.5 g/dL    Hematocrit (External) 35.2 (L) 40.0 - 50.0 %    MCV (External) 79.6 (L) 80.0 - 98.0 fL    MCH (External) 24.0 (L) 25.5 - 34.0 pg    MCHC (External) 30.1 (L) 31.5 - 36.5 g/dL    RDW (External) 15.6 (H) 11.5 - 15.5 %    Platelet Count (External) 178 140 - 400 K/uL    Absolute Neutrophils (External) 3.9 1.8 - 8.0 K/uL    Absolute Lymphocytes (External) 1.2 0.8 - 4.1 K/uL    Absolute Monocytes (External) 0.7 0.0 - 1.0 K/uL    Absolute Eosinophils (External) 0.1 0.0 - 0.7 K/uL    Absolute Basophils (External) 0.0 0.0 -  0.2 K/uL    Absolute Immature Granulocytes (External) 0.11 (H) 0.00 - 0.06 K/uL    % Neutrophils (External) 64.2 %    % Lymphocytes (External) 19.3 %    % Monocytes (External) 12.2 %    % Immature Granulocytes (External) 1.8 %    % Eosinophils (External) 2.0 %    % Basophils (External) 0.5 %    Nucleated RBCs (External) 0 0 - 1 /100 WBC    Method (External) Auto    Tacrolimus by Tandem Mass Spectrometry    Collection Time: 06/02/23  7:05 AM   Result Value Ref Range    Tacrolimus by Tandem Mass Spectrometry 10.6 5.0 - 15.0 ug/L    Tacrolimus Last Dose Date 6/1/2023     Tacrolimus Last Dose Time  8:20 PM    Echocardiogram Complete    Collection Time: 06/06/23  8:21 AM   Result Value Ref Range    LVEF  55-60%    Creatinine    Collection Time: 06/06/23  9:48 AM   Result Value Ref Range    Creatinine 1.45 (H) 0.67 - 1.17 mg/dL    GFR Estimate 56 (L) >60 mL/min/1.73m2   Calcium    Collection Time: 06/06/23  9:48 AM   Result Value Ref Range    Calcium 9.8 8.6 - 10.0 mg/dL   CBC with platelets    Collection Time: 06/06/23  9:48 AM   Result Value Ref Range    WBC Count 7.5 4.0 - 11.0 10e3/uL    RBC Count 4.58 4.40 - 5.90 10e6/uL    Hemoglobin 11.0 (L) 13.3 - 17.7 g/dL    Hematocrit 35.9 (L) 40.0 - 53.0 %    MCV 78 78 - 100 fL    MCH 24.0 (L) 26.5 - 33.0 pg    MCHC 30.6 (L) 31.5 - 36.5 g/dL    RDW 15.7 (H) 10.0 - 15.0 %    Platelet Count 196 150 - 450 10e3/uL   Magnesium    Collection Time: 06/06/23  9:48 AM   Result Value Ref Range    Magnesium 1.7 1.7 - 2.3 mg/dL   General PFT Lab (Please always keep checked)    Collection Time: 06/06/23 10:17 AM   Result Value Ref Range    FVC-Pred 3.57 L    FVC-Pre 2.32 L    FVC-%Pred-Pre 64 %    FEV1-Pre 1.47 L    FEV1-%Pred-Pre 51 %    FEV1FVC-Pred 80 %    FEV1FVC-Pre 64 %    FEFMax-Pred 8.17 L/sec    FEFMax-Pre 5.03 L/sec    FEFMax-%Pred-Pre 61 %    FEF2575-Pred 2.57 L/sec    FEF2575-Pre 0.70 L/sec    PAA4187-%Pred-Pre 27 %    ExpTime-Pre 8.59 sec    FIFMax-Pre 4.48 L/sec     FEV1FEV6-Pred 79 %    FEV1FEV6-Pre 65 %         Results as noted above.    PFT Interpretation:  Moderate obstructive ventilatory defect.  Unchanged from previous.   Below recent best, but similar to baseline.  Valid Maneuver        Scribe Disclosure:   I, Obed Toribio, am serving as a scribe; to document services personally performed by Dr. Abiodun Woods- -based on data collection and the provider's statements to me.     Provider Disclosure:  I agree with above History, Review of Systems, Physical exam and Plan.  I have reviewed the content of the documentation and have edited it as needed. I have personally performed the services documented here and the documentation accurately represents those services and the decisions I have made.      Electronically signed by:  Dr. Abiodun Woods            Again, thank you for allowing me to participate in the care of your patient.        Sincerely,        Abiodun Woods MD

## 2023-06-06 NOTE — TELEPHONE ENCOUNTER
Writer called patient to let him know that the INR was missed today. Patient reports he is getting labs again on Friday 6/9 and will get an INR then.     Suni Choi RN

## 2023-06-06 NOTE — PROGRESS NOTES
Nursing Note  Edson KEVEN Thornton Jr. presents today to [facility] for:   Chief Complaint   Patient presents with     Blood Draw     Labs drawn with piv start by rn.  VS taken.     Infusion     Reclast       Patient seen by provider today: Yes   present during visit today: Not Applicable.    Note: takes a multi vitamin.      Intravenous Access:  Peripheral IV placed on second floor lab.    Treatment Conditions:  Results reviewed, labs MET treatment parameters, ok to proceed with treatment.      Post Infusion Assessment:  Access discontinued per protocol.       Discharge Plan:   Patient and/or family verbalized understanding of discharge instructions and all questions answered.  Patient discharged in stable condition accompanied by: self.        Abby Barajas RN    Administrations This Visit     zoledronic Acid (RECLAST) infusion 5 mg     Admin Date  06/06/2023 Action  $New Bag Dose  5 mg Rate  200 mL/hr Route  Intravenous Administered By  Abby Barajas RN                /70 (BP Location: Right arm, Patient Position: Sitting, Cuff Size: Adult Regular)   Pulse 65   Temp 97.7  F (36.5  C) (Oral)   Resp 16   Wt 67.6 kg (149 lb 1.6 oz)   SpO2 97%   BMI 25.59 kg/m

## 2023-06-07 LAB
1,3 BETA GLUCAN SER-MCNC: 410 PG/ML
1,3 BETA GLUCAN SER-MCNC: 426 PG/ML
OBSERVATION IMP: POSITIVE
OBSERVATION IMP: POSITIVE

## 2023-06-07 NOTE — RESULT ENCOUNTER NOTE
Tacrolimus level 7.5 at 10 hours on 6/623.   Goal 8-10    No change in dose as patient likely close to goal at 12 hours. Will recheck level in a week.

## 2023-06-08 LAB
GALACTOMANNAN AG SERPL QL IA: NEGATIVE
GALACTOMANNAN AG SPEC IA-ACNC: 0.07
VORICONAZOLE SERPL-MCNC: 2 UG/ML (ref 1–5.5)

## 2023-06-09 ENCOUNTER — ANTICOAGULATION THERAPY VISIT (OUTPATIENT)
Dept: ANTICOAGULATION | Facility: CLINIC | Age: 58
End: 2023-06-09
Payer: COMMERCIAL

## 2023-06-09 ENCOUNTER — LAB (OUTPATIENT)
Dept: LAB | Facility: CLINIC | Age: 58
End: 2023-06-09

## 2023-06-09 DIAGNOSIS — Z79.899 ENCOUNTER FOR LONG-TERM (CURRENT) USE OF HIGH-RISK MEDICATION: ICD-10-CM

## 2023-06-09 DIAGNOSIS — Z94.2 LUNG REPLACED BY TRANSPLANT (H): Primary | ICD-10-CM

## 2023-06-09 DIAGNOSIS — I48.91 ATRIAL FIBRILLATION, UNSPECIFIED TYPE (H): Primary | ICD-10-CM

## 2023-06-09 LAB — INR (EXTERNAL): 2.3 (ref 0.9–1.1)

## 2023-06-09 PROCEDURE — 80197 ASSAY OF TACROLIMUS: CPT | Performed by: INTERNAL MEDICINE

## 2023-06-09 NOTE — PROGRESS NOTES
ANTICOAGULATION MANAGEMENT     Edson Thornton Jr. 58 year old male is on warfarin with therapeutic INR result. (Goal INR 2.0-3.0)    Recent labs: (last 7 days)     06/09/23  0000   INR 2.3*       ASSESSMENT       Source(s): Patient/Caregiver Call       Warfarin doses taken: Warfarin taken as instructed    Diet: No new diet changes identified    Medication/supplement changes: None noted    New illness, injury, or hospitalization: No    Signs or symptoms of bleeding or clotting: No    Previous result: Subtherapeutic    Additional findings: None         PLAN     Recommended plan for no diet, medication or health factor changes affecting INR     Dosing Instructions: Continue your current warfarin dose with next INR in 1 week       Summary  As of 6/9/2023    Full warfarin instructions:  3 mg every Sun, Tue, Thu; 2 mg all other days   Next INR check:  6/16/2023             Telephone call with Bret who verbalizes understanding and agrees to plan and who agrees to plan and repeated back plan correctly    Patient using outside facility for labs    Education provided:     None required    Plan made per [facility] anticoagulation protocol    Suni Choi, RN  Anticoagulation Clinic  6/9/2023    _______________________________________________________________________     Anticoagulation Episode Summary     Current INR goal:  2.0-3.0   TTR:  71.0 % (11.8 mo)   Target end date:  Indefinite   Send INR reminders to:  [facility]    Indications    Atrial fibrillation  unspecified type (H) [I48.91]  Encounter for long-term (current) use of high-risk medication [Z79.899]           Comments:           Anticoagulation Care Providers     Provider Role Specialty Phone number    Abiodun Woods MD Referring Pulmonary Disease [phone]

## 2023-06-09 NOTE — RESULT ENCOUNTER NOTE
Vori level 2.0  Fungitell positive 426  Galactomannan negative    Reviewed with Dr. Woods:   Continue voriconazole. Continue checking fungal markers monthly

## 2023-06-14 LAB
TACROLIMUS BLD-MCNC: 6.6 UG/L (ref 5–15)
TME LAST DOSE: NORMAL H
TME LAST DOSE: NORMAL H

## 2023-06-16 ENCOUNTER — ANTICOAGULATION THERAPY VISIT (OUTPATIENT)
Dept: ANTICOAGULATION | Facility: CLINIC | Age: 58
End: 2023-06-16
Payer: COMMERCIAL

## 2023-06-16 ENCOUNTER — TELEPHONE (OUTPATIENT)
Dept: TRANSPLANT | Facility: CLINIC | Age: 58
End: 2023-06-16
Payer: COMMERCIAL

## 2023-06-16 ENCOUNTER — LAB (OUTPATIENT)
Dept: LAB | Facility: CLINIC | Age: 58
End: 2023-06-16

## 2023-06-16 DIAGNOSIS — I48.91 ATRIAL FIBRILLATION, UNSPECIFIED TYPE (H): Primary | ICD-10-CM

## 2023-06-16 DIAGNOSIS — Z94.2 S/P LUNG TRANSPLANT (H): Chronic | ICD-10-CM

## 2023-06-16 DIAGNOSIS — I10 PRIMARY HYPERTENSION: ICD-10-CM

## 2023-06-16 DIAGNOSIS — Z79.899 ENCOUNTER FOR LONG-TERM (CURRENT) USE OF HIGH-RISK MEDICATION: ICD-10-CM

## 2023-06-16 DIAGNOSIS — J18.9 PNEUMONIA OF LEFT LOWER LOBE DUE TO INFECTIOUS ORGANISM: ICD-10-CM

## 2023-06-16 DIAGNOSIS — Z94.2 LUNG REPLACED BY TRANSPLANT (H): ICD-10-CM

## 2023-06-16 DIAGNOSIS — E83.42 HYPOMAGNESEMIA: ICD-10-CM

## 2023-06-16 DIAGNOSIS — B44.9 ASPERGILLUS (H): ICD-10-CM

## 2023-06-16 DIAGNOSIS — D84.9 IMMUNOSUPPRESSED STATUS (H): ICD-10-CM

## 2023-06-16 LAB
EXPTIME-PRE: 8.59 SEC
FEF2575-%PRED-PRE: 27 %
FEF2575-PRE: 0.7 L/SEC
FEF2575-PRED: 2.57 L/SEC
FEFMAX-%PRED-PRE: 61 %
FEFMAX-PRE: 5.03 L/SEC
FEFMAX-PRED: 8.17 L/SEC
FEV1-%PRED-PRE: 51 %
FEV1-PRE: 1.47 L
FEV1FEV6-PRE: 65 %
FEV1FEV6-PRED: 79 %
FEV1FVC-PRE: 64 %
FEV1FVC-PRED: 80 %
FIFMAX-PRE: 4.48 L/SEC
FVC-%PRED-PRE: 64 %
FVC-PRE: 2.32 L
FVC-PRED: 3.57 L

## 2023-06-16 PROCEDURE — 80197 ASSAY OF TACROLIMUS: CPT | Performed by: INTERNAL MEDICINE

## 2023-06-16 RX ORDER — TACROLIMUS 1 MG/1
1 CAPSULE ORAL DAILY
Qty: 30 CAPSULE | Refills: 11
Start: 2023-06-16 | End: 2023-06-29

## 2023-06-16 RX ORDER — TACROLIMUS 0.5 MG/1
0.5 CAPSULE ORAL DAILY
Qty: 30 CAPSULE | Refills: 11
Start: 2023-06-16 | End: 2023-06-29

## 2023-06-16 NOTE — TELEPHONE ENCOUNTER
Tacrolimus level 6.6 at 11.5 hours, on 6/9/23.  Goal 8-10.   Current dose 0.5 mg in AM, 0.5 mg in PM. Pt switched over to 0.5mg tablets on his own a couple weeks ago. Since taking tablets, will try increasing dose to 0.5mg in AM and 1mg in PM tablets.     Repeat level in a week    Discussed with patient

## 2023-06-16 NOTE — PROGRESS NOTES
ANTICOAGULATION MANAGEMENT     Edson Thornton Jr. 58 year old male is on warfarin with therapeutic INR result. (Goal INR 2.0-3.0)    Recent labs: (last 7 days)     06/16/23  0727   INR 2.3*       ASSESSMENT       Source(s): Patient/Caregiver Call       Warfarin doses taken: Warfarin taken as instructed    Diet: No new diet changes identified    Medication/supplement changes: None noted    New illness, injury, or hospitalization: No    Signs or symptoms of bleeding or clotting: No    Previous result: Therapeutic last 2(+) visits    Additional findings: None         PLAN     Recommended plan for no diet, medication or health factor changes affecting INR     Dosing Instructions: Continue your current warfarin dose with next INR in 1 week       Summary  As of 6/16/2023    Full warfarin instructions:  3 mg every Sun, Tue, Thu; 2 mg all other days   Next INR check:  6/23/2023             Telephone call with Bret who verbalizes understanding and agrees to plan and who agrees to plan and repeated back plan correctly    Patient using outside facility for labs    Education provided:     None required    Plan made per [facility] anticoagulation protocol    Suni Choi RN  Anticoagulation Clinic  6/16/2023    _______________________________________________________________________     Anticoagulation Episode Summary     Current INR goal:  2.0-3.0   TTR:  71.8 % (11.8 mo)   Target end date:  Indefinite   Send INR reminders to:  [facility]    Indications    Atrial fibrillation  unspecified type (H) [I48.91]  Encounter for long-term (current) use of high-risk medication [Z79.899]           Comments:           Anticoagulation Care Providers     Provider Role Specialty Phone number    Abiodun Woods MD Referring Pulmonary Disease [phone]

## 2023-06-20 LAB
TACROLIMUS BLD-MCNC: 7.3 UG/L (ref 5–15)
TME LAST DOSE: NORMAL H
TME LAST DOSE: NORMAL H

## 2023-06-21 NOTE — RESULT ENCOUNTER NOTE
Tacrolimus level 7.3 on 6/16/23. Goal 8-10. Dose change made on 6/16 so this level is not reflective of dose increase. Pt will get repeat level drawn this Friday

## 2023-06-23 ENCOUNTER — ANTICOAGULATION THERAPY VISIT (OUTPATIENT)
Dept: ANTICOAGULATION | Facility: CLINIC | Age: 58
End: 2023-06-23
Payer: COMMERCIAL

## 2023-06-23 ENCOUNTER — LAB (OUTPATIENT)
Dept: LAB | Facility: CLINIC | Age: 58
End: 2023-06-23

## 2023-06-23 DIAGNOSIS — Z79.899 ENCOUNTER FOR LONG-TERM (CURRENT) USE OF HIGH-RISK MEDICATION: ICD-10-CM

## 2023-06-23 DIAGNOSIS — I48.91 ATRIAL FIBRILLATION, UNSPECIFIED TYPE (H): Primary | ICD-10-CM

## 2023-06-23 DIAGNOSIS — Z94.2 LUNG REPLACED BY TRANSPLANT (H): Primary | ICD-10-CM

## 2023-06-23 LAB — INR (EXTERNAL): 2.4 (ref 0.9–1.1)

## 2023-06-23 PROCEDURE — 80197 ASSAY OF TACROLIMUS: CPT | Performed by: INTERNAL MEDICINE

## 2023-06-23 NOTE — PROGRESS NOTES
ANTICOAGULATION MANAGEMENT     Edson Thornton Jr. 58 year old male is on warfarin with therapeutic INR result. (Goal INR 2.0-3.0)    Recent labs: (last 7 days)     06/23/23  0000   INR 2.4*       ASSESSMENT       Source(s): Chart Review and Patient/Caregiver Call       Warfarin doses taken: Warfarin taken as instructed    Diet: No new diet changes identified    Medication/supplement changes: None noted    New illness, injury, or hospitalization: No    Signs or symptoms of bleeding or clotting: No    Previous result: Therapeutic last 2(+) visits    Additional findings: None         PLAN     Recommended plan for no diet, medication or health factor changes affecting INR     Dosing Instructions: Continue your current warfarin dose with next INR in 1 week       Summary  As of 6/23/2023    Full warfarin instructions:  3 mg every Sun, Tue, Thu; 2 mg all other days   Next INR check:  6/30/2023             Telephone call with Bret who verbalizes understanding and agrees to plan    Patient using outside facility for labs    Education provided:     Goal range and lab monitoring: goal range and significance of current result    Plan made per [facility] anticoagulation protocol    Dionne Lopez RN  Anticoagulation Clinic  6/23/2023    _______________________________________________________________________     Anticoagulation Episode Summary     Current INR goal:  2.0-3.0   TTR:  73.4 % (11.7 mo)   Target end date:  Indefinite   Send INR reminders to:  [facility]    Indications    Atrial fibrillation  unspecified type (H) [I48.91]  Encounter for long-term (current) use of high-risk medication [Z79.899]           Comments:           Anticoagulation Care Providers     Provider Role Specialty Phone number    Abiodun Woods MD Referring Pulmonary Disease [phone]

## 2023-06-26 DIAGNOSIS — Z94.2 LUNG REPLACED BY TRANSPLANT (H): Primary | ICD-10-CM

## 2023-06-26 RX ORDER — HYDROXYCHLOROQUINE SULFATE 200 MG/1
200 TABLET, FILM COATED ORAL 2 TIMES DAILY
Qty: 180 TABLET | Refills: 3 | Status: SHIPPED | OUTPATIENT
Start: 2023-06-26 | End: 2024-04-08

## 2023-06-27 DIAGNOSIS — R09.89 PULMONARY AIR TRAPPING: ICD-10-CM

## 2023-06-27 RX ORDER — LEVALBUTEROL TARTRATE 45 UG/1
AEROSOL, METERED ORAL
Qty: 15 G | Refills: 0 | Status: SHIPPED | OUTPATIENT
Start: 2023-06-27

## 2023-06-28 ENCOUNTER — TELEPHONE (OUTPATIENT)
Dept: TRANSPLANT | Facility: CLINIC | Age: 58
End: 2023-06-28
Payer: COMMERCIAL

## 2023-06-28 LAB
TACROLIMUS BLD-MCNC: 11.2 UG/L (ref 5–15)
TME LAST DOSE: NORMAL H
TME LAST DOSE: NORMAL H

## 2023-06-28 NOTE — TELEPHONE ENCOUNTER
Patient Call: Voicemail  Date/Time: 6/29/2023  2413  Reason for call: Pt having a few toenails removed today wonders what antibiotic he can use

## 2023-06-28 NOTE — TELEPHONE ENCOUNTER
Pt had ingrown toenails trimmed, Podiatrist recommending course of Cephalexin. Will consult pharmacist.     Per Pharmacist: May moderately increase INR, may monitor INR/ bleed sx. No major interactions I found.      Pt instructed to let coumadin clinic know length of therapy as INR may need more frequent monitoring.

## 2023-06-29 DIAGNOSIS — Z94.2 LUNG REPLACED BY TRANSPLANT (H): ICD-10-CM

## 2023-06-29 DIAGNOSIS — D84.9 IMMUNOSUPPRESSED STATUS (H): ICD-10-CM

## 2023-06-29 RX ORDER — TACROLIMUS 1 MG/1
1 CAPSULE ORAL DAILY
Qty: 30 CAPSULE | Refills: 11
Start: 2023-06-29 | End: 2023-08-15

## 2023-06-29 RX ORDER — TACROLIMUS 0.5 MG/1
0.5 CAPSULE ORAL 2 TIMES DAILY
Qty: 60 CAPSULE | Refills: 11
Start: 2023-06-29 | End: 2023-08-15

## 2023-06-29 NOTE — PROGRESS NOTES
Tacrolimus level from 6/23/23 came back at 11.2 at 12 hours.   Goal 8-10.      Dose decreased to 0.5mg in AM and 0.5mg in PM   Recheck tacrolimus level on Friday 7/7.

## 2023-06-30 ENCOUNTER — ANTICOAGULATION THERAPY VISIT (OUTPATIENT)
Dept: ANTICOAGULATION | Facility: CLINIC | Age: 58
End: 2023-06-30
Payer: COMMERCIAL

## 2023-06-30 DIAGNOSIS — Z79.899 ENCOUNTER FOR LONG-TERM (CURRENT) USE OF HIGH-RISK MEDICATION: ICD-10-CM

## 2023-06-30 DIAGNOSIS — I48.91 ATRIAL FIBRILLATION, UNSPECIFIED TYPE (H): Primary | ICD-10-CM

## 2023-06-30 LAB — INR (EXTERNAL): 2.8 (ref 0.9–1.1)

## 2023-06-30 NOTE — PROGRESS NOTES
ANTICOAGULATION MANAGEMENT     Edson Thornton Jr. 58 year old male is on warfarin with therapeutic INR result. (Goal INR 2.0-3.0)    Recent labs: (last 7 days)     06/30/23  0000   INR 2.8*       ASSESSMENT       Source(s): Chart Review and Patient/Caregiver Call       Warfarin doses taken: Warfarin taken as instructed    Diet: No new diet changes identified    Medication/supplement changes: None noted    New illness, injury, or hospitalization: No    Signs or symptoms of bleeding or clotting: No    Previous result: Therapeutic last 2(+) visits    Additional findings: None         PLAN     Recommended plan for no diet, medication or health factor changes affecting INR     Dosing Instructions: Continue your current warfarin dose with next INR in 2 weeks       Summary  As of 6/30/2023    Full warfarin instructions:  3 mg every Sun, Tue, Thu; 2 mg all other days   Next INR check:  7/7/2023             Telephone call with Bret who verbalizes understanding and agrees to plan    Patient using outside facility for labs    Education provided:     Goal range and lab monitoring: goal range and significance of current result    Plan made per [facility] anticoagulation protocol    Dionne Lopez RN  Anticoagulation Clinic  6/30/2023    _______________________________________________________________________     Anticoagulation Episode Summary     Current INR goal:  2.0-3.0   TTR:  75.4 % (11.7 mo)   Target end date:  Indefinite   Send INR reminders to:  [facility]    Indications    Atrial fibrillation  unspecified type (H) [I48.91]  Encounter for long-term (current) use of high-risk medication [Z79.899]           Comments:           Anticoagulation Care Providers     Provider Role Specialty Phone number    Abiodun Woods MD Referring Pulmonary Disease [phone]

## 2023-07-10 ENCOUNTER — LAB (OUTPATIENT)
Dept: LAB | Facility: CLINIC | Age: 58
End: 2023-07-10

## 2023-07-10 PROCEDURE — 80197 ASSAY OF TACROLIMUS: CPT | Performed by: INTERNAL MEDICINE

## 2023-07-11 ENCOUNTER — ANTICOAGULATION THERAPY VISIT (OUTPATIENT)
Dept: ANTICOAGULATION | Facility: CLINIC | Age: 58
End: 2023-07-11

## 2023-07-11 DIAGNOSIS — I48.91 ATRIAL FIBRILLATION, UNSPECIFIED TYPE (H): Primary | ICD-10-CM

## 2023-07-11 DIAGNOSIS — Z79.899 ENCOUNTER FOR LONG-TERM (CURRENT) USE OF HIGH-RISK MEDICATION: ICD-10-CM

## 2023-07-11 NOTE — PROGRESS NOTES
ANTICOAGULATION MANAGEMENT     Edson Thornton Jr. 58 year old male is on warfarin with therapeutic INR result. (Goal INR 2.0-3.0)    Recent labs: (last 7 days)     07/10/23  0735   INR 2.0*       ASSESSMENT     Source(s): Chart Review and Patient/Caregiver Call     Warfarin doses taken: Warfarin taken as instructed  Diet: No new diet changes identified  Medication/supplement changes:  now reporting that he had been on a short course of prophy cephalexin after he had a toenail removed, this is now completed. No dose adjustment was needed while he was on the cephalexin so unlikely to need an adjustment now that it is completed  New illness, injury, or hospitalization: No  Signs or symptoms of bleeding or clotting: No  Previous result: Therapeutic last 2(+) visits  Additional findings: None       PLAN     Recommended plan for no diet, medication or health factor changes affecting INR     Dosing Instructions: Continue your current warfarin dose with next INR in 1-2 weeks, Bret has his labs done every Friday and states he will likely check INR later this week.     Summary  As of 7/11/2023      Full warfarin instructions:  3 mg every Sun, Tue, Thu; 2 mg all other days   Next INR check:  7/21/2023               Telephone call with Bret who verbalizes understanding and agrees to plan    Patient using outside facility for labs    Education provided:   Contact [phone] with any changes, questions or concerns.     Plan made per [facility] anticoagulation protocol    Annabel Leiva RN  Anticoagulation Clinic  7/11/2023    _______________________________________________________________________     Anticoagulation Episode Summary       Current INR goal:  2.0-3.0   TTR:  78.5 % (11.7 mo)   Target end date:  Indefinite   Send INR reminders to:  [facility]    Indications    Atrial fibrillation  unspecified type (H) [I48.91]  Encounter for long-term (current) use of high-risk medication [Z79.899]             Comments:                Anticoagulation Care Providers       Provider Role Specialty Phone number    Abiodun Woods MD Referring Pulmonary Disease [phone]

## 2023-07-12 ENCOUNTER — TELEPHONE (OUTPATIENT)
Dept: TRANSPLANT | Facility: CLINIC | Age: 58
End: 2023-07-12
Payer: COMMERCIAL

## 2023-07-12 LAB
TACROLIMUS BLD-MCNC: 9.2 UG/L (ref 5–15)
TME LAST DOSE: NORMAL H
TME LAST DOSE: NORMAL H

## 2023-07-12 NOTE — TELEPHONE ENCOUNTER
Dr. Myers from [facility] ER calls to report the following: Patient presented overnight with SOB, hypoxia (sats as low as mid 70s), and chest CT showing bilateral opacities.  Patient currently on 2 L/min of oxygen.   Wanting to speak with transplant MD to determine a plan of care.  Transfer versus treat locally.  Provided number to hospital for transfer.  [phone].  Also informed Dr. Mckeon, inpatient attending and requested that he call to discuss plan of care.

## 2023-07-12 NOTE — TELEPHONE ENCOUNTER
[facility] MD looking to touch base with Dr. Mckeon. Phone number to contact Dr. Mckeon given.

## 2023-07-12 NOTE — TELEPHONE ENCOUNTER
Provider Call: General  Route to LPN    Reason for call: [facility]  Would like to talk wit  Dr Mckeon  Pulmonary there does not want to scope pt  Needs to review with us     Call back needed? Yes    Return Call Needed  Same as documented in contacts section  When to return call?: Same day: Route High Priority

## 2023-07-18 DIAGNOSIS — Z94.2 LUNG REPLACED BY TRANSPLANT (H): Primary | ICD-10-CM

## 2023-07-18 NOTE — PROGRESS NOTES
Pt admitted to OSH 7/12-7/14 with PNA.     Discharged on Levaquin. On RA. Will have repeat labs this Friday. Pt reports feeling well, still has some SOB but has been improving.   Clinic follow up scheduled for 8/1.     VM left with radiology asking for chest CT done 7/12 be pushed to PACs.

## 2023-07-18 NOTE — LETTER
PHYSICIAN ORDERS      DATE & TIME ISSUED: 2023 10:12 AM  PATIENT NAME: Edson Thornton Jr.   : 1965     [facility] MR# [if applicable]: [id number]     DIAGNOSIS:  Lung Transplant  Z94.2    Please draw iron and iron binding capacity, ferritin, bmp, mag, cbc with platelets and tacrolimus level 23.     Any questions please call: Alfred [phone]    Please fax these results to [phone].      .  Abiodun Woods MD  Division of Pulmonary, Allergy, Critical Care and Sleep Medicine  Professor of Medicine

## 2023-07-21 ENCOUNTER — ANTICOAGULATION THERAPY VISIT (OUTPATIENT)
Dept: ANTICOAGULATION | Facility: CLINIC | Age: 58
End: 2023-07-21
Payer: COMMERCIAL

## 2023-07-21 ENCOUNTER — LAB (OUTPATIENT)
Dept: LAB | Facility: CLINIC | Age: 58
End: 2023-07-21
Payer: COMMERCIAL

## 2023-07-21 DIAGNOSIS — Z94.2 LUNG REPLACED BY TRANSPLANT (H): Primary | ICD-10-CM

## 2023-07-21 DIAGNOSIS — Z94.2 LUNG REPLACED BY TRANSPLANT (H): ICD-10-CM

## 2023-07-21 DIAGNOSIS — I48.91 ATRIAL FIBRILLATION, UNSPECIFIED TYPE (H): Primary | ICD-10-CM

## 2023-07-21 DIAGNOSIS — E61.1 IRON DEFICIENCY: Primary | ICD-10-CM

## 2023-07-21 DIAGNOSIS — Z79.899 ENCOUNTER FOR LONG-TERM (CURRENT) USE OF HIGH-RISK MEDICATION: ICD-10-CM

## 2023-07-21 LAB
INR (EXTERNAL): 2.1 (ref 0.9–1.1)
INR (EXTERNAL): 2.5 (ref 0.9–1.1)
INR (EXTERNAL): 2.6 (ref 0.9–1.1)

## 2023-07-21 PROCEDURE — 80197 ASSAY OF TACROLIMUS: CPT

## 2023-07-21 RX ORDER — FERROUS SULFATE 325(65) MG
325 TABLET ORAL
Qty: 90 TABLET | Refills: 3 | Status: SHIPPED | OUTPATIENT
Start: 2023-07-21 | End: 2024-07-17

## 2023-07-21 NOTE — PROGRESS NOTES
ANTICOAGULATION MANAGEMENT     Edson Thornton Jr. 58 year old male is on warfarin with therapeutic INR result. (Goal INR 2.0-3.0)    Recent labs: (last 7 days)     07/21/23  0737   INR 2.4*       ASSESSMENT       Source(s): Chart Review and Patient/Caregiver Call       Warfarin doses taken: Warfarin taken as instructed    Diet: No new diet changes identified    Medication/supplement changes: on levaquin 7/14/23 - 7/28/23 for pneumonia    New illness, injury, or hospitalization: Yes: hospitalized 7/12/23 - 7/14/23 at [facility] in [place] for multifocal bilateral pneumonia    Signs or symptoms of bleeding or clotting: No    Previous result: Therapeutic last 2(+) visits    Additional findings: Warfarin dosing in hospital was the same as his maintenance dose         PLAN     Recommended plan for no diet, medication or health factor changes affecting INR     Dosing Instructions: Continue your current warfarin dose with next INR in 1 week       Summary  As of 7/21/2023    Full warfarin instructions:  3 mg every Sun, Tue, Thu; 2 mg all other days   Next INR check:  7/28/2023             Telephone call with Bert who agrees to plan and repeated back plan correctly    Patient using outside facility for labs    Education provided:     Please call back if any changes to your diet, medications or how you've been taking warfarin    Contact [phone] with any changes, questions or concerns.     Plan made per [facility] anticoagulation protocol    Mesha Chau, RN  Anticoagulation Clinic  7/21/2023    _______________________________________________________________________     Anticoagulation Episode Summary     Current INR goal:  2.0-3.0   TTR:  81.4 % (11.7 mo)   Target end date:  Indefinite   Send INR reminders to:  [facility]    Indications    Atrial fibrillation  unspecified type (H) [I48.91]  Encounter for long-term (current) use of high-risk medication [Z79.899]           Comments:           Anticoagulation Care  Providers     Provider Role Specialty Phone number    Abiodun Woods MD Referring Pulmonary Disease [phone]

## 2023-07-24 LAB
TACROLIMUS BLD-MCNC: 8.2 UG/L (ref 5–15)
TME LAST DOSE: NORMAL H
TME LAST DOSE: NORMAL H

## 2023-07-25 NOTE — RESULT ENCOUNTER NOTE
Tacrolimus level 8.2 on 7/21/23 at 12 hours   Goal 8-10    Level at goal, no change in dose    MyChart message sent to patient

## 2023-07-28 ENCOUNTER — ANTICOAGULATION THERAPY VISIT (OUTPATIENT)
Dept: ANTICOAGULATION | Facility: CLINIC | Age: 58
End: 2023-07-28
Payer: COMMERCIAL

## 2023-07-28 ENCOUNTER — LAB (OUTPATIENT)
Dept: LAB | Facility: CLINIC | Age: 58
End: 2023-07-28
Payer: COMMERCIAL

## 2023-07-28 DIAGNOSIS — I48.91 ATRIAL FIBRILLATION, UNSPECIFIED TYPE (H): Primary | ICD-10-CM

## 2023-07-28 DIAGNOSIS — Z94.2 LUNG REPLACED BY TRANSPLANT (H): Primary | ICD-10-CM

## 2023-07-28 DIAGNOSIS — Z79.899 ENCOUNTER FOR LONG-TERM (CURRENT) USE OF HIGH-RISK MEDICATION: ICD-10-CM

## 2023-07-28 PROCEDURE — 80197 ASSAY OF TACROLIMUS: CPT | Performed by: INTERNAL MEDICINE

## 2023-07-28 NOTE — PROGRESS NOTES
ANTICOAGULATION MANAGEMENT     Edson Thornton Jr. 58 year old male is on warfarin with therapeutic INR result. (Goal INR 2.0-3.0)    Recent labs: (last 7 days)     07/28/23  0716   INR 2.1*       ASSESSMENT     Source(s): Chart Review and Patient/Caregiver Call     Warfarin doses taken: Warfarin taken as instructed  Diet: No new diet changes identified  Medication/supplement changes:  he will finish levaquin today (was on for pneumonia)  New illness, injury, or hospitalization: Yes: recent hospitalization for pneumonia.  Bret reports he is feeling better today  Signs or symptoms of bleeding or clotting: No  Previous result: Therapeutic last 2(+) visits  Additional findings:  [facility] and will check INR then       PLAN     Recommended plan for no diet, medication or health factor changes affecting INR     Dosing Instructions: Continue your current warfarin dose with next INR in 4 days       Summary  As of 7/28/2023      Full warfarin instructions:  3 mg every Sun, Tue, Thu; 2 mg all other days   Next INR check:  8/1/2023               Telephone call with Bret who verbalizes understanding and agrees to plan    Check at provider office visit    Education provided:   Contact [phone] with any changes, questions or concerns.     Plan made per [facility] anticoagulation protocol    Mesha Chau, RN  Anticoagulation Clinic  7/28/2023    _______________________________________________________________________     Anticoagulation Episode Summary       Current INR goal:  2.0-3.0   TTR:  83.4 % (11.7 mo)   Target end date:  Indefinite   Send INR reminders to:  [facility]    Indications    Atrial fibrillation  unspecified type (H) [I48.91]  Encounter for long-term (current) use of high-risk medication [Z79.899]             Comments:               Anticoagulation Care Providers       Provider Role Specialty Phone number    Abiodun Woods MD Referring Pulmonary Disease  [phone]

## 2023-07-31 DIAGNOSIS — Z94.2 LUNG REPLACED BY TRANSPLANT (H): Primary | ICD-10-CM

## 2023-07-31 LAB
TACROLIMUS BLD-MCNC: 6.4 UG/L (ref 5–15)
TME LAST DOSE: NORMAL H
TME LAST DOSE: NORMAL H

## 2023-08-01 ENCOUNTER — LAB (OUTPATIENT)
Dept: LAB | Facility: CLINIC | Age: 58
End: 2023-08-01
Payer: COMMERCIAL

## 2023-08-01 ENCOUNTER — ANTICOAGULATION THERAPY VISIT (OUTPATIENT)
Dept: ANTICOAGULATION | Facility: CLINIC | Age: 58
End: 2023-08-01

## 2023-08-01 ENCOUNTER — OFFICE VISIT (OUTPATIENT)
Dept: TRANSPLANT | Facility: CLINIC | Age: 58
End: 2023-08-01
Attending: INTERNAL MEDICINE
Payer: COMMERCIAL

## 2023-08-01 ENCOUNTER — ANCILLARY PROCEDURE (OUTPATIENT)
Dept: GENERAL RADIOLOGY | Facility: CLINIC | Age: 58
End: 2023-08-01
Attending: INTERNAL MEDICINE
Payer: COMMERCIAL

## 2023-08-01 VITALS
RESPIRATION RATE: 14 BRPM | BODY MASS INDEX: 25.16 KG/M2 | SYSTOLIC BLOOD PRESSURE: 135 MMHG | WEIGHT: 151 LBS | HEART RATE: 70 BPM | TEMPERATURE: 98 F | DIASTOLIC BLOOD PRESSURE: 82 MMHG | HEIGHT: 65 IN | OXYGEN SATURATION: 96 %

## 2023-08-01 DIAGNOSIS — Z94.2 LUNG REPLACED BY TRANSPLANT (H): ICD-10-CM

## 2023-08-01 DIAGNOSIS — Z00.6 RESEARCH STUDY PATIENT: ICD-10-CM

## 2023-08-01 DIAGNOSIS — Z79.899 ENCOUNTER FOR LONG-TERM (CURRENT) USE OF HIGH-RISK MEDICATION: ICD-10-CM

## 2023-08-01 DIAGNOSIS — D84.9 IMMUNOSUPPRESSED STATUS (H): ICD-10-CM

## 2023-08-01 DIAGNOSIS — E83.42 HYPOMAGNESEMIA: ICD-10-CM

## 2023-08-01 DIAGNOSIS — Z94.2 S/P LUNG TRANSPLANT (H): Primary | Chronic | ICD-10-CM

## 2023-08-01 DIAGNOSIS — Z94.2 LUNG REPLACED BY TRANSPLANT (H): Primary | ICD-10-CM

## 2023-08-01 DIAGNOSIS — I48.91 ATRIAL FIBRILLATION, UNSPECIFIED TYPE (H): Primary | ICD-10-CM

## 2023-08-01 DIAGNOSIS — I48.91 ATRIAL FIBRILLATION, UNSPECIFIED TYPE (H): ICD-10-CM

## 2023-08-01 DIAGNOSIS — Z94.2 S/P LUNG TRANSPLANT (H): ICD-10-CM

## 2023-08-01 LAB
ANION GAP SERPL CALCULATED.3IONS-SCNC: 11 MMOL/L (ref 7–15)
BUN SERPL-MCNC: 23.2 MG/DL (ref 6–20)
CALCIUM SERPL-MCNC: 9.3 MG/DL (ref 8.6–10)
CHLORIDE SERPL-SCNC: 106 MMOL/L (ref 98–107)
CREAT SERPL-MCNC: 1.07 MG/DL (ref 0.67–1.17)
DEPRECATED HCO3 PLAS-SCNC: 25 MMOL/L (ref 22–29)
ERYTHROCYTE [DISTWIDTH] IN BLOOD BY AUTOMATED COUNT: 19 % (ref 10–15)
GFR SERPL CREATININE-BSD FRML MDRD: 80 ML/MIN/1.73M2
GLUCOSE SERPL-MCNC: 91 MG/DL (ref 70–99)
HCT VFR BLD AUTO: 37.3 % (ref 40–53)
HGB BLD-MCNC: 11.1 G/DL (ref 13.3–17.7)
INR BLD: 2.6 (ref 0.9–1.1)
MAGNESIUM SERPL-MCNC: 1.6 MG/DL (ref 1.7–2.3)
MCH RBC QN AUTO: 23.8 PG (ref 26.5–33)
MCHC RBC AUTO-ENTMCNC: 29.8 G/DL (ref 31.5–36.5)
MCV RBC AUTO: 80 FL (ref 78–100)
PLATELET # BLD AUTO: 168 10E3/UL (ref 150–450)
POTASSIUM SERPL-SCNC: 3.9 MMOL/L (ref 3.4–5.3)
RBC # BLD AUTO: 4.66 10E6/UL (ref 4.4–5.9)
SODIUM SERPL-SCNC: 142 MMOL/L (ref 136–145)
TACROLIMUS BLD-MCNC: 5.6 UG/L (ref 5–15)
TME LAST DOSE: NORMAL H
TME LAST DOSE: NORMAL H
WBC # BLD AUTO: 8.8 10E3/UL (ref 4–11)

## 2023-08-01 PROCEDURE — 36415 COLL VENOUS BLD VENIPUNCTURE: CPT | Performed by: PATHOLOGY

## 2023-08-01 PROCEDURE — 87799 DETECT AGENT NOS DNA QUANT: CPT | Performed by: INTERNAL MEDICINE

## 2023-08-01 PROCEDURE — 71046 X-RAY EXAM CHEST 2 VIEWS: CPT | Mod: GC | Performed by: RADIOLOGY

## 2023-08-01 PROCEDURE — 87305 ASPERGILLUS AG IA: CPT | Mod: 90 | Performed by: PATHOLOGY

## 2023-08-01 PROCEDURE — 80048 BASIC METABOLIC PNL TOTAL CA: CPT | Performed by: PATHOLOGY

## 2023-08-01 PROCEDURE — 86832 HLA CLASS I HIGH DEFIN QUAL: CPT | Performed by: INTERNAL MEDICINE

## 2023-08-01 PROCEDURE — 94375 RESPIRATORY FLOW VOLUME LOOP: CPT | Performed by: INTERNAL MEDICINE

## 2023-08-01 PROCEDURE — 99000 SPECIMEN HANDLING OFFICE-LAB: CPT | Performed by: PATHOLOGY

## 2023-08-01 PROCEDURE — 84999 UNLISTED CHEMISTRY PROCEDURE: CPT | Performed by: INTERNAL MEDICINE

## 2023-08-01 PROCEDURE — 99215 OFFICE O/P EST HI 40 MIN: CPT | Mod: 25 | Performed by: STUDENT IN AN ORGANIZED HEALTH CARE EDUCATION/TRAINING PROGRAM

## 2023-08-01 PROCEDURE — 85027 COMPLETE CBC AUTOMATED: CPT | Performed by: PATHOLOGY

## 2023-08-01 PROCEDURE — 80197 ASSAY OF TACROLIMUS: CPT | Performed by: INTERNAL MEDICINE

## 2023-08-01 PROCEDURE — 86833 HLA CLASS II HIGH DEFIN QUAL: CPT | Performed by: INTERNAL MEDICINE

## 2023-08-01 PROCEDURE — 99214 OFFICE O/P EST MOD 30 MIN: CPT | Performed by: STUDENT IN AN ORGANIZED HEALTH CARE EDUCATION/TRAINING PROGRAM

## 2023-08-01 PROCEDURE — 87449 NOS EACH ORGANISM AG IA: CPT | Mod: 90 | Performed by: PATHOLOGY

## 2023-08-01 PROCEDURE — 83735 ASSAY OF MAGNESIUM: CPT | Performed by: PATHOLOGY

## 2023-08-01 PROCEDURE — 85610 PROTHROMBIN TIME: CPT | Performed by: PATHOLOGY

## 2023-08-01 PROCEDURE — 80285 DRUG ASSAY VORICONAZOLE: CPT | Performed by: INTERNAL MEDICINE

## 2023-08-01 RX ORDER — MAGNESIUM OXIDE 400 MG/1
800 TABLET ORAL 3 TIMES DAILY
COMMUNITY
Start: 2022-03-24 | End: 2023-08-01

## 2023-08-01 RX ORDER — FAMOTIDINE 20 MG/1
20 TABLET, FILM COATED ORAL DAILY
COMMUNITY

## 2023-08-01 RX ORDER — AZELASTINE 1 MG/ML
2 SPRAY, METERED NASAL 2 TIMES DAILY
COMMUNITY
Start: 2023-06-12 | End: 2024-02-13

## 2023-08-01 RX ORDER — MAGNESIUM GLYCINATE 15 %
300 POWDER (GRAM) MISCELLANEOUS 3 TIMES DAILY
Qty: 18 G | Refills: 11 | Status: SHIPPED | OUTPATIENT
Start: 2023-08-01 | End: 2023-08-02 | Stop reason: ALTCHOICE

## 2023-08-01 ASSESSMENT — PAIN SCALES - GENERAL: PAINLEVEL: NO PAIN (0)

## 2023-08-01 NOTE — PATIENT INSTRUCTIONS
Patient Instructions  1. Continue to hydrate with 60-70 oz fluids daily.  2. Continue to exercise daily or most days of the week.  3. Follow up with your primary care provider for annual gender health maintenance procedures.  4. Follow up with colonoscopy schedule.  5. Follow up with annual dermatology visits.  6. It doesn't seem like the COVID vaccine is working well in lung transplant patients. A number of lung transplant patients have gotten sick with COVID even after receiving the vaccines. Based on our recent experience, it can be life-threatening to get COVID  even after being vaccinated. Please continue to act like you did not get the COVID vaccine - social distancing, wearing a mask, good hand hygiene, etc. If the people around you are vaccinated, it will help reduce the risk of you getting COVID. All members of your household should be vaccinated.  7. PFT's improved.   8. Continue the Xopenex as needed.     Next transplant clinic appointment:  3 months with CXR, labs and PFTs  Next lab draw: have a tacrolimus level and Vori level done tomorrow at a 12 hour draw.     AVS printed at time of check out

## 2023-08-01 NOTE — PROGRESS NOTES
[facility]  Pulmonary Transplant Follow Up Visit  Aug 1, 2023    Reason for Visit  Edson Thornton Jr. is a 58 year old year old male who is being seen for follow up of lung transplant.  Assessment and plan:   Edson Thornton is a 58 year old male with NSIP/ILD, bronchiectasis, moderate PH, RA, SALTY, chronic HSV infection, hypogammaglobulinemia, steroid-induced diabetes, hypothyroidism, PFO, HTN, HLD, duodenal anomaly, anxiety, and depression. Admitted on 9/5/21 from OSH for acute on chronic respiratory failure 2/2 ILD exacerbation now s/p BSLT on 10/16/21. Prolonged vent wean s/p trach and PEG/J tube.  Post-op course  complicated by encephalopathy and diffuse weakness, acute to subacute CVA, Afib with RVR, BRENNAN, GI bleed, Candidemia/Candida empyema, and anxiety. Code called 11/2/21 for bradycardia/asystole, progressive hypotension, found to have significant GI bleed, EGD with adherent clot near PEG tube site that was clipped.  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 His exercise tolerance and PFTs have not reached the level expected.     Pulmonary/lung transplant: Development of pneumonia in May, treated outpatient with levofloxacin. Hospitalized with pneumonia in July, treated with broadand spectrum antibiotics, initially needing oxygen but able to wean off. Today feels like still recovering but many of symptoms are back to baseline. Rarely using Xopenex. O2 96% and PFTs are up from last couple of visits. CXR with blunting of costophrenic angle, by personal read appears stable from earlier this year prior to pneumonia. Does get physical activity through job which requires him to walk around a lot. Was having dyspnea with exertion, Echo in June with normal LV function, no valvular disease, overall stable from 2021.    Tacrolimus will be adjusted to maintain a level of 8-10. Continue current prednisone. The patient has recently changed insurance providers which does not cover myfortic. Previous use of cellcept caused the patient diarrhea. We will submit a prior auth to try to obtain coverage for myfortic, required immunosuppression for his lung transplant.   Prophylaxis: Continue Bactrim for PJP and nocardia prophylaxis. CMV -/-.     Positive crossmatch: The patient had a retrospective positive crossmatch following transplantation, attributed to rituximab received in June 2022.  Subsequent DSA is positive, waxing and waning but consistently below the MFI threshold for treatment.   Date DSA mfi Biopsy (date) Treatment   10/17/2021  none         10/23/2021  none         11/1/2021  none         11/15/2021  none         11/29/2021  DQ B5  2522       12/6/2021  DQ B5  1873       12/12/2021  DQ B5  1703       12/27/2021  DQ B5  3924       1/3/2022  DQ B5  3072       1/10/2022  DQ B5  4032       1/17/2022  DQB5  1849       2/3/2022 DQB5   2488       2/7/2022 DQB5  1874       2/10/2022 DQB5  1781       3/15/2022 DQB5  2254       3/21/2022 DQB5  2117       4/18/2022 DQB5   908       6/20/2022  DQB5  1100       6/29/2022  DQB5  1411       9/12/2022 DQB5  1681       11/14/2022 DQB5  891       12/20/2022  DQ B5  1553       3/8/2023  DQ B5  551       4/25/2023 DQ B5 None     8/1/23 DQ B5 None        Infectious disease: The patient has completed a course of Augmentin for Rothia identified in sputum 3/8/23 where also grew Aspergillus fumigatus.  Voriconazole level was below goal, the dose was increased to 250 mg twice daily. Lung nodules have resolved. IgE 5.  Galactomannan has turned negative as of late March but fungitell remains positive including today 8/1/23.  He will complete at least a 3-month course. Continue to monitor Vori levels.   Pulmonary embolism: There was a question of small right upper lobe PE on the outside CT at the time of  his COVID infection.  On review with Radiology at [place] it was felt this may represent artifact rather than failure of anticoagulation.  Continue chronic anticoagulation with warfarin as below.  Sinus/Ear Congestion: At 8/1/23 visit reported ongoing congestion, feeling of ears plugged. Has used Flonase, Astelin, anti-histamines for treatment in past without significant improvement. Given that symptoms ongoing and quite bothersome will refer to ENT.    Hypogammaglobulinemia: IgG adequate at 502 on 1/3.  Repeat IgG low at 168 on 3/7, s/p IVIG on 3/8. Repeated IgG on 3/28 adequate at 650. Continue monitoring.   Ophthalmology: History of double vision and visual loss.  Defer to ophthalmology for additional follow-up.   Rheumatoid arthritis: Some arthralgias. Saw Rheum 6/29/23 and reported morning stiffness and left shoulder pain. Was continued on current medications including IS as above and hydroxychloroquine, advised to use of Tylenol for arthritis. Today reports hands have felt pretty good, minimal use of Voltaren.  Atrial fibrillation with RVR: On exam was in NSR.  Continue propranolol and warfarin.  Obstructive sleep apnea: Continue Bi-PAP, is using nightly.  Hypertension: Blood pressure adequately controlled with current amlodipine and propranolol. EKG in June showed non concerning left ventricle thickening.   Depression/anxiety: Adequately controlled with Cymbalta.   Reflux/GI bleed: Continue pantoprazole.  Steroid-induced diabetes: Sugars reported to be 90-130s, lowest 70s. Continue current insulin regimen.   Hypothyroidism: Continue current levothyroxine.  Multiple acute/subacute ischemic strokes: etiology likely embolic vs perioperative. MRI brain 10/23 with infarcts noted in both cerebral hemispheres, left cerebellum, presumed associated with new Afib vs perioperative. Bilateral upper extremity paresis (R>L), suspicion for some cortical blindness. SBP goal < 140. Continue warfarin, HTN and BS control and  rosuvastatin.  Bone health: Reclast per Endocrinology recommendations.  CKD: History of  stage IIIa CKD. Creatininie and GFR in normal range today and at low end of patient's range. The patient was encouraged to maintain adequate hydration.  He requires a therapeutic dose of tacrolimus but should avoid other nephrotoxins.  Psychosocial: Holds 40hr/week position as a  with the [employer]. 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 Reports walking a lot through work.    Healthcare maintenance: The patient is up to date with COVID vaccination and influenza vaccination.  Annual studies will be due in November.  Last colonoscopy: 7/21/2020    Follow up in 3 months  Leticia Gordon MD PhD  [facility]  [facility]   Pulmonary Transplant     I personally spent 50 minutes on the date of the encounter in documentation, the interview and exam, and review of the chart/labs/imaging not including time spent interpreting spirometry.     Lung TX HPI  Transplants:  10/16/2021 (Lung), Postoperative day:  654    The patient was seen and examined by Leticia Gordon MD    Last Pulmonary clinic visit 6/6/2023 at which time reported had not been feeling well since 5/31. He had abdominal distension, dizziness, headache, nausea, vomiting, shortness of breath, amd increased cough frequency with no sputum with 99.1 degree temp. No associated chills or chest pain. Baseline night sweats. Was evaluated in local clinic, CT Chest with new LLL pneumonia. All symptoms subsided within one day of starting levofloxacin. He had been doing 1 mile on the treadmill 2-3x a week prior to getting sick.     Since last visit: Admitted at [place] for bilateral pneumonia on 7/12/23. Discussed with Transplant, started on cefepime and Vancomycin, continued on IS, scheduled nebs. Concern for mucus plugging and possible need for  bronchoscopy and planned transfer to [facility] but was able to wean down from 4L O2 to room air. All viral testing was negative. Discharge on 7/14/23 on levofloxacin.    8/1/2023  Reports that when got sick couldn't breathe, could move a quarter of normal. Had cough that was productive of sometimes green/yellow sputum and spots of blood. O2 level was 75% took a while to get above 80. Got nebs in hospital but still was not coughing up much. Feels like still in the process of getting better once got home. Feels like is starting over ever time gets sick, in terms of breathing and endurance. Today breathing is labored with exertion not atypical. Is back at work ( [employer]) and walks around at lot at work. Does have bad days where more winded but not out of ordinary to baseline. Wheezing, sometimes with cough, cough is typical, no chest pain. No dizziness, lightheadedness or heart pounding. Is using his BiPAP. Saw Rheumatology in late June, has seen Audiology.     Review of Systems   Appetite is not great but eats to maintain weight, longstanding issue  No ear pain, sore throat, sinus pain  Mouth is dry, over past few months, biotin helps a little  Ears are plugged up a lot  No vision changes  No nausea, vomiting, diarrhea or abdominal pain.  No swollen lymph nodes  Bilateral hand arthritis, actually pretty good now  Easy bruising    A complete ROS was otherwise negative except as noted in the HPI.  Current Outpatient Medications   Medication     acetaminophen (TYLENOL) 325 MG tablet     amLODIPine (NORVASC) 10 MG tablet     calcium carbonate 600 mg-vitamin D 400 units 600-400 MG-UNIT per tablet     diclofenac (VOLTAREN) 1 % topical gel     ferrous sulfate (FEROSUL) 325 (65 Fe) MG tablet     FLUoxetine (PROZAC) 20 MG capsule     fluticasone (FLONASE) 50 MCG/ACT nasal spray     fluticasone-salmeterol (ADVAIR) 500-50 MCG/ACT inhaler     hydroxychloroquine (PLAQUENIL) 200 MG tablet     insulin aspart (NOVOLOG  PEN) 100 UNIT/ML pen     insulin pen needle (32G X 4 MM) 32G X 4 MM miscellaneous     lamoTRIgine (LAMICTAL) 25 MG tablet     levalbuterol (XOPENEX HFA) 45 MCG/ACT inhaler     levothyroxine (SYNTHROID/LEVOTHROID) 25 MCG tablet     Magnesium Glycinate POWD     Melatonin 10 MG TABS tablet     mirabegron (MYRBETRIQ) 50 MG 24 hr tablet     mirtazapine (REMERON) 7.5 MG tablet     multivitamin w/minerals (THERA-VIT-M) tablet     MYFORTIC (BRAND) 360 MG EC tablet     ondansetron (ZOFRAN-ODT) 4 MG ODT tab     pantoprazole (PROTONIX) 40 MG EC tablet     predniSONE (DELTASONE) 2.5 MG tablet     predniSONE (DELTASONE) 5 MG tablet     propranolol (INDERAL) 20 MG tablet     rOPINIRole (REQUIP) 0.25 MG tablet     rosuvastatin (CRESTOR) 10 MG tablet     senna-docusate (SENOKOT-S/PERICOLACE) 8.6-50 MG tablet     sulfamethoxazole-trimethoprim (BACTRIM) 400-80 MG tablet     tacrolimus (GENERIC EQUIVALENT) 0.5 MG capsule     tacrolimus (GENERIC EQUIVALENT) 1 MG capsule     tacrolimus (GENERIC EQUIVALENT) 1 mg/mL suspension     tamsulosin (FLOMAX) 0.4 MG capsule     voriconazole (VFEND) 200 MG tablet     voriconazole (VFEND) 50 MG tablet     warfarin ANTICOAGULANT (COUMADIN) 1 MG tablet     No current facility-administered medications for this visit.     No Known Allergies  Past Medical History:   Diagnosis Date     BRENNAN (acute kidney injury) (H) 10/17/2021     Anxiety      Depression      HLD (hyperlipidemia)      HTN (hypertension)      Hypothyroidism      ILD (interstitial lung disease) (H)      Infection due to 2019 novel coronavirus 03/2023     SALTY on CPAP      Oxygen dependent     BL 4L since ~6/2021     Primary hypertension 02/28/2022     Rheumatoid arthritis (H)     signs ~5/2020, dx 5/2021     S/P lung transplant (H) 10/16/2021     Shock liver 10/17/2021     Steroid-induced hyperglycemia      Traction bronchiectasis (H)      Past Surgical History:   Procedure Laterality Date     BRONCHOSCOPY (RIGID OR FLEXIBLE), DIAGNOSTIC N/A  1/26/2022    Procedure: BRONCHOSCOPY, WITH BRONCHOALVEOLAR LAVAGE AND BIOPSY;  Surgeon: Perlman, David Morris, MD;  Location:  GI     BRONCHOSCOPY (RIGID OR FLEXIBLE), DIAGNOSTIC N/A 4/19/2022    Procedure: BRONCHOSCOPY, WITH BRONCHOALVEOLAR LAVAGE AND BIOPSY;  Surgeon: Sherine Merrill MD;  Location:  GI     BRONCHOSCOPY (RIGID OR FLEXIBLE), DIAGNOSTIC N/A 6/21/2022    Procedure: BRONCHOSCOPY, WITH BRONCHOALVEOLAR LAVAGE AND BIOPSY;  Surgeon: Aneesh Rubio MD;  Location:  GI     COLONOSCOPY W/ BIOPSIES AND POLYPECTOMY  07/21/2020     CV CORONARY ANGIOGRAM N/A 09/08/2021    Procedure: Coronary Angiogram with possible intervention;  Surgeon: Jovon Bullock MD;  Location:  HEART CARDIAC CATH LAB     CV RIGHT HEART CATH MEASUREMENTS RECORDED N/A 09/08/2021    Procedure: Right Heart Cath;  Surgeon: Jovon Bullock MD;  Location:  HEART CARDIAC CATH LAB     ESOPHAGOSCOPY, GASTROSCOPY, DUODENOSCOPY (EGD), COMBINED N/A 10/23/2021    Procedure: ESOPHAGOGASTRODUODENOSCOPY (EGD);  Surgeon: Miquel Pisano MD;  Location:  GI     ESOPHAGOSCOPY, GASTROSCOPY, DUODENOSCOPY (EGD), COMBINED N/A 11/02/2021    Procedure: ESOPHAGOGASTRODUODENOSCOPY (EGD);  Surgeon: Daniel Ortiz MD;  Location:  GI     ESOPHAGOSCOPY, GASTROSCOPY, DUODENOSCOPY (EGD), COMBINED N/A 11/5/2021    Procedure: ESOPHAGOGASTRODUODENOSCOPY (EGD);  Surgeon: Ronnell Hernandez MD;  Location:  GI     EXTRACTION(S) DENTAL N/A 09/22/2021    Procedure: EXTRACTION tooth #19;  Surgeon: Deepak Tobin DDS;  Location:  OR     HERNIA REPAIR       IR CHEST TUBE PLACEMENT NON-TUNNELLED LEFT  11/03/2021     PICC TRIPLE LUMEN PLACEMENT Left 11/04/2021    5FR TL PICC. Right non occlusive thrombus subclavian vein.     REPLACE GASTROJEJUNOSTOMY TUBE, PERCUTANEOUS N/A 11/9/2021    Procedure: REPLACEMENT, GASTROJEJUNOSTOMY TUBE, PERCUTANEOUS;  Surgeon: Hernando Rodriguez MD;  Location:  GI     right acl       TRACHEOSTOMY  "PERCUTANEOUS N/A 10/29/2021    Procedure: Percutaneous Tracheostomy,;  Surgeon: Celine Jenkins MD;  Location: [facility] OR     TRANSPLANT LUNG RECIPIENT SINGLE X2 Bilateral 10/16/2021    Procedure: TRANSPLANT, LUNG, RECIPIENT, BILATERAL, Bronchoscopy, on-pump perfusion, bilateral clamshell sternotomy;  Surgeon: Yanick Corral MD;  Location: [facility] OR     XR ACROMIOCLAVICULAR JOINT BILATERAL       Social History     Socioeconomic History     Marital status: Single     Spouse name: Not on file     Number of children: Not on file     Years of education: Not on file     Highest education level: Not on file   Occupational History     Not on file   Tobacco Use     Smoking status: Never     Smokeless tobacco: Never   Substance and Sexual Activity     Alcohol use: Never     Drug use: Never     Sexual activity: Not on file   Other Topics Concern     Parent/sibling w/ CABG, MI or angioplasty before 65F 55M? Not Asked   Social History Narrative    Full time  for the [employer] starting May 24, 2023.     Social Determinants of Health     Financial Resource Strain: Not on file   Food Insecurity: Not on file   Transportation Needs: Not on file   Physical Activity: Not on file   Stress: Not on file   Social Connections: Not on file   Intimate Partner Violence: Not on file   Housing Stability: Not on file     /82 (BP Location: Right arm, Patient Position: Sitting, Cuff Size: Adult Regular)   Pulse 70   Temp 98  F (36.7  C) (Oral)   Resp 14   Ht 1.638 m (5' 4.5\")   Wt 68.5 kg (151 lb)   SpO2 96%   BMI 25.52 kg/m    Exam:   GENERAL APPEARANCE: Well developed, well nourished, alert, and in no apparent distress.  EYES: PERRL, EOMI  HENT: Nasal mucosa with no edema and no hyperemia. No nasal polyps.  EARS: Canals with some cerumen, TMs normal  MOUTH: Oral mucosa is moist, without any lesions, no tonsillar enlargement, no oropharyngeal exudate.  NECK: supple, no masses, no thyromegaly.  LYMPHATICS: " No significant cervical, or supraclavicular nodes.  RESP: Good air flow in upper fields, some diminishment bases.  No crackles. No rhonchi.    CV: Normal S1, S2, regular rhythm, normal rate. No murmur.  No rub. No gallop. Trace edema.   ABDOMEN:  Bowel sounds normal, soft, nontender, no HSM or masses.   MS: extremities normal. No clubbing. No cyanosis.  SKIN: no rash on limited exam  NEURO: Mentation intact, speech normal, normal strength and tone, normal gait and stance  PSYCH: mentation appears normal. and affect normal/bright  Results:  Recent Results (from the past 168 hour(s))   CBC with Platelets & Differential    Collection Time: 07/28/23  7:16 AM   Result Value Ref Range    WBC Count (External) 7.7 4.0 - 11.0 K/uL    RBC Count (External) 4.54 4.40 - 5.80 M/UL    Hemoglobin (External) 10.7 (L) 13.5 - 17.5 g/dL    Hematocrit (External) 36.1 (L) 40.0 - 50.0 %    MCV (External) 79.5 (L) 80.0 - 98.0 fL    MCH (External) 23.6 (L) 25.5 - 34.0 pg    MCHC (External) 29.6 (L) 31.5 - 36.5 g/dL    RDW (External) 17.6 (H) 11.5 - 15.5 %    Platelet Count (External) 219 140 - 400 K/uL    Absolute Neutrophils (External) 5.0 1.8 - 8.0 K/uL    Absolute Bands (External) 0.5 0.0 - 0.7 K/uL    Absolute Lymphocytes (External) 1.2 0.8 - 4.1 K/uL    Absolute Monocytes (External) 0.8 K/uL    Absolute Eosinophils (External) 0.2 K/uL    % Neutrophils (External) 65.0 %    % Bands (External) 7.0 %    % Lymphocytes (External) 16.0 %    % Monocytes (External) 10.0 %    % Eosinophils (External) 2.0 %   Basic metabolic panel    Collection Time: 07/28/23  7:16 AM   Result Value Ref Range    Glucose (External) 107 (H) 70 - 99 mg/dL    Urea Nitrogen (External) 21 6 - 22 mg/dL    Creatinine (External) 1.04 0.73 - 1.18 mg/dL    BUN/Creatinine Ratio (External) 20.2 10.0 - 25.0    Sodium (External) 141 136 - 145 meq/L    Potassium (External) 4.8 3.5 - 5.1 meq/L    Chloride (External) 110 (H) 98 - 109 meq/L    CO2 (External) 26 22 - 31 meq/L     Anion Gap (External) 10 6 - 20 meq/L    Calcium (External) 9.0 8.5 - 10.5 mg/dL    GFR Estimated (External) 83 >=60 ml/min/1.73m2   INR    Collection Time: 07/28/23  7:16 AM   Result Value Ref Range    PT - Prothrombine Time (External) 23.9 (H) 12.0 - 14.5 secs    INR (External) 2.1 (H) 0.9 - 1.1   Tacrolimus by Tandem Mass Spectrometry    Collection Time: 07/28/23  7:16 AM   Result Value Ref Range    Tacrolimus by Tandem Mass Spectrometry 6.4 5.0 - 15.0 ug/L    Tacrolimus Last Dose Date 7/27/2023     Tacrolimus Last Dose Time  7:30 PM      Results as noted above.    PFT Interpretation:   Moderate obstructive ventilatory defect.  Improved from previous.   Above recent best  Valid Maneuver

## 2023-08-01 NOTE — PROGRESS NOTES
ANTICOAGULATION MANAGEMENT     Edson Thornton Jr. 58 year old male is on warfarin with therapeutic INR result. (Goal INR 2.0-3.0)    Recent labs: (last 7 days)     08/01/23  1206   INR 2.6*       ASSESSMENT     Source(s): Chart Review and Patient/Caregiver Call     Warfarin doses taken: Reviewed in chart  Diet: No new diet changes identified  Medication/supplement changes: None noted  New illness, injury, or hospitalization: No  Signs or symptoms of bleeding or clotting: No  Previous result: Therapeutic last 2(+) visits  Additional findings:  Patient is seen in clinic today.  Routine labs are done today rather than Friday       PLAN     Recommended plan for no diet, medication or health factor changes affecting INR     Dosing Instructions: Continue your current warfarin dose with next INR in 10 days       Summary  As of 8/1/2023      Full warfarin instructions:  3 mg every Sun, Tue, Thu; 2 mg all other days   Next INR check:  8/11/2023               Detailed voice message left for Bret with dosing instructions and follow up date.     Patient using outside facility for labs    Education provided:   Return call if anything has changed with medications, health or diet    Plan made per [facility] anticoagulation protocol    Terrie Cedillo RN  Anticoagulation Clinic  8/1/2023    _______________________________________________________________________     Anticoagulation Episode Summary       Current INR goal:  2.0-3.0   TTR:  83.4 % (11.7 mo)   Target end date:  Indefinite   Send INR reminders to:  [facility]    Indications    Atrial fibrillation  unspecified type (H) [I48.91]  Encounter for long-term (current) use of high-risk medication [Z79.899]             Comments:               Anticoagulation Care Providers       Provider Role Specialty Phone number    Abiodun Woods MD Referring Pulmonary Disease [phone]             normal...

## 2023-08-01 NOTE — LETTER
PHYSICIAN ORDERS      DATE & TIME ISSUED: 2023 4:29 PM  PATIENT NAME: Edson Thornton Jr.   : 1965     [facility] MR# [if applicable]: [id number]     DIAGNOSIS:  Lung Transplant  Z94.2  Please draw a Voriconazole level on 2023       Any questions please call: Isaura at [phone]     Please fax these results to [phone].      .  Aibodun Woods MD  Division of Pulmonary, Allergy, Critical Care and Sleep Medicine  Professor of Medicine

## 2023-08-01 NOTE — PROGRESS NOTES
Transplant Coordinator Note    Reason for visit: Post lung transplant follow up visit   Coordinator: Present   Caregiver:  daughter     Health concerns addressed today:  1. Post hospital follow up today. PT is feeling better.   2. Breathing had been better.   3.  Will check IgG level today.   4. Dry mouth. Uses biotene.   5. IgG pending- may need replacement.   6. ENT Referral for plugged ears and congestion.   7. Tacrolimus and Vori taken at a 16 hr level. Pt will recheck tomorrow. Will send orders for Voris levels.     Activity/rehab: pt walks often at work.   Oxygen needs: none   Pain management/RX: Tylenol for pain.   Diabetic management: checking BG's       COVID:  COVID-19 infection (yes/no, date of most recent positive test):   Status/instructions given about COVID-19 vaccine:     Pt Education: medications (use/dose/side effects), how/when to call coordinator, frequency of labs, s/s of infection/rejection, call prior to starting any new medications, lab/vital sign book    Health Maintenance:   Last colonoscopy:   Next colonoscopy due:   Dermatology:  Vaccinations this visit:     Labs, CXR, PFTs reviewed with patient  Medication record reviewed and reconciled  Questions and concerns addressed    Patient Instructions  1. Continue to hydrate with 60-70 oz fluids daily.  2. Continue to exercise daily or most days of the week.  3. Follow up with your primary care provider for annual gender health maintenance procedures.  4. Follow up with colonoscopy schedule.  5. Follow up with annual dermatology visits.  6. It doesn't seem like the COVID vaccine is working well in lung transplant patients. A number of lung transplant patients have gotten sick with COVID even after receiving the vaccines. Based on our recent experience, it can be life-threatening to get COVID  even after being vaccinated. Please continue to act like you did not get the COVID vaccine - social distancing, wearing a mask, good hand hygiene, etc. If  the people around you are vaccinated, it will help reduce the risk of you getting COVID. All members of your household should be vaccinated.  7. PFT's improved.   8. Continue the Xopenex as needed.     Next transplant clinic appointment:  3 months with CXR, labs and PFTs  Next lab draw: have a tacrolimus level and Vori level done tomorrow at a 12 hour draw.     AVS printed at time of check out

## 2023-08-01 NOTE — Clinical Note
8/1/2023         RE: Edson Thornton Jr.  [address]  [place] [address]        Dear Colleague,    Thank you for referring your patient, Edson Thornton Jr., to the [facility]. Please see a copy of my visit note below.    [facility]  Pulmonary Transplant Follow Up Visit  Aug 1, 2023    Reason for Visit  Edson Thornton Jr. is a 58 year old year old male who is being seen for follow up of lung transplant.  Assessment and plan:   Edson Thornton is a 58 year old male with NSIP/ILD, bronchiectasis, moderate PH, RA, SALTY, chronic HSV infection, hypogammaglobulinemia, steroid-induced diabetes, hypothyroidism, PFO, HTN, HLD, duodenal anomaly, anxiety, and depression. Admitted on 9/5/21 from OSH for acute on chronic respiratory failure 2/2 ILD exacerbation now s/p BSLT on 10/16/21. Prolonged vent wean s/p trach and PEG/J tube.  Post-op course also complicated by encephalopathy and diffuse weakness, acute to subacute CVA, afib with RVR, BRENNAN, GI bleed, Candidemia/Candida empyema, and anxiety. Code called 11/2/21 for bradycardia/asystole, progressive hypotension, found to have significant GI bleed, EGD with adherent clot near PEG tube site that was clipped.  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 His exercise tolerance and PFTs have not reached the level expected.     Pulmonary/lung transplant: Development of pneumonia in May and July with rapid improvement after start of antibiotics ***, most recently on ***. His PFT's ***, and he is oxygenating adequately at ***%.  Chest CT/CXR *** . Has been experiencing persistent dyspnea with exertion, etiology unclear. Echo on *** with normal LV function, no valvular disease. Trying to encourage exercise ***.    Tacrolimus will be adjusted to maintain  a level of 8-10. Continue current prednisone. The patient has recently changed insurance providers which does not cover myfortic. Previous use of cellcept caused the patient diarrhea. We will submit a prior auth to try to obtain coverage for myfortic, required immunosuppression for his lung transplant.   Prophylaxis: Continue Bactrim for PJP and nocardia prophylaxis. CMV -/-.     Positive crossmatch: The patient had a retrospective positive crossmatch following transplantation, attributed to rituximab received in June 2022.  Subsequent DSA is positive, waxing and waning but consistently below the MFI threshold for treatment.   Date DSA mfi Biopsy (date) Treatment   10/17/2021  none         10/23/2021  none         11/1/2021  none         11/15/2021  none         11/29/2021  DQ B5  2522       12/6/2021  DQ B5  1873       12/12/2021  DQ B5  1703       12/27/2021  DQ B5  3924       1/3/2022  DQ B5  3072       1/10/2022  DQ B5  4032       1/17/2022  DQB5  1849       2/3/2022 DQB5   2488       2/7/2022 DQB5  1874       2/10/2022 DQB5  1781       3/15/2022 DQB5  2254       3/21/2022 DQB5  2117       4/18/2022 DQB5   908       6/20/2022  DQB5  1100       6/29/2022  DQB5  1411       9/12/2022 DQB5  1681       11/14/2022 DQB5  891       12/20/2022  DQ B5  1553       3/8/2023  DQ B5  551       4/25/2023 DQ B5 None     ***          Infectious disease: The patient has completed a course of Augmentin for Rothia identified in sputum 3/8/23 where also grew Aspergillus fumigatus.  Voriconazole level was below goal, the dose was increased to 250 mg twice daily. Lung nodules have resolved. IgE 5.  Galactomannan has turned (-) but fungitell is still (+).  He will complete at least a 3-month course.  Pulmonary embolism: There was a question of small right upper lobe PE on the outside CT at the time of his COVID infection.  On review with radiology at [place] it was felt this may represent artifact rather than failure of  anticoagulation.  Continue chronic anticoagulation as previous (see below).   Hypogammaglobulinemia: IgG adequate at 502 on 1/3.  Repeat IgG low at 168 on 3/7, s/p IVIG on 3/8. Repeated IgG on 3/28 adequate at 650. Continue monitoring. ***  Ophthalmology: History of double vision and visual loss.  Defer to ophthalmology for additional follow-up.  Rheumatoid arthritis: The patient reports some hand stiffness but generally adequately controlled with Voltaren gel and current immunosuppression.  Atrial fibrillation with RVR: Patient appears to be in ***.  Continue propranolol and warfarin.  Obstructive sleep apnea: Continue Bi-PAP.  Hypertension: Blood pressure adequately controlled with current amlodipine and propranolol. EKG showed non concerning left ventricle thickening.   Depression/anxiety: Adequately controlled with Cymbalta.   Reflux/GI bleed: Continue pantoprazole.  Steroid-induced diabetes: Continue current insulin regimen.   Hypothyroidism: Continue current levothyroxine.  Multiple acute/subacute ischemic strokes: etiology likely embolic vs perioperative. MRI brain 10/23 with infarcts noted in both cerebral hemispheres, left cerebellum, presumed associated with new Afib vs perioperative. Bilateral upper extremity paresis (R>L), suspicion for some cortical blindness. SBP goal < 140. Continue warfarin, HTN and BS control and rosuvastatin.  Bone health: Reclast per endocrinology recommendations.  CKD: History of  stage IIIa CKD. Creatininie is at the low end of his recent range. The patient was encouraged to maintain adequate hydration.  He requires a therapeutic dose of tacrolimus but should avoid other nephrotoxins.  Psychosocial: The patient started a 40hr/week position as a  with the [employer] two weeks ago. 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   Healthcare maintenance: The patient is up to date with COVID  vaccination and influenza vaccination.  Annual studies will be due in November***.  Last colonoscopy: 7/21/2020    Follow up in ***  Leticia Gordon MD PhD  [facility]  [facility]   Pulmonary Transplant     I personally spent *** minutes on the date of the encounter in documentation, the interview and exam, and review of the chart/labs/imaging not including time spent interpreting spirometry.     Lung TX HPI  Transplants:  10/16/2021 (Lung), Postoperative day:  654    The patient was seen and examined by Leticia Gordon MD    Edson Thornton is a 58 year old male with NSIP/ILD, bronchiectasis, moderate PH, RA, SALTY, chronic HSV infection, hypogammaglobulinemia, steroid-induced diabetes, hypothyroidism, PFO, HTN, HLD, duodenal anomaly, anxiety, and depression. Admitted on 9/5/21 from OSH for acute on chronic respiratory failure 2/2 ILD exacerbation now s/p BSLT on 10/16/21. Prolonged vent wean s/p trach and PEG/J tube.  Post-op course also complicated by encephalopathy and diffuse weakness, acute to subacute CVA, afib with RVR, BRENNAN, GI bleed, Candidemia/Candida empyema, and anxiety. Code called 11/2 for bradycardia/asystole, progressive hypotension, found to have significant GI bleed, EGD with adherent clot near PEG tube site that was clipped.  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    Last Pulmonary clinic visit 6/6/2023 at which time reported had not been feeling well since 5/31. He had abdominal distension, dizzyness, headache, nausea, vomiting, shortness of breath, amd increased cough frequency with no sputum with 99.1 degree temp. No associated chills or chest pain. Baseline night sweats. Was evaluated in local clinic, CT Chest with new LLL pneumonia. All symptoms subsided within one day of initiating levofloxacin. He had  been doing 1 mile on the treadmill 2-3x a week prior to getting sick and parents coming into town.     Since last visit: Admitted at [facility] for bilateral pneumonia on 7/12/23. Discussed with Transplant, started on cefepime and Vancomycin, continued on IS, scheduled nebs. Concern for mucus plugging and possible need for bronchoscopy and planned transfer to [facility] but in coming days able to wean down from 4L O2 to room air. Infectious workup showed *** Discharge on 7/14/23 on levofloxacin.    Resp   When got sick couldn't breathe, move quarter of normal. Cough, not too bad, sometimes green/yellow sputum and spots of blood. O2 level was 75% took a while to get above 80. Nebs in hospital but not coughing up much. Still getting beter once get home. Like starting over ever time get sick, with breathing and endurance. Today breathing is labored with exertion not atypical. Back at work and walk around at lot at work. Does have bad days where more winded but not out of ordinary. Wheezing, sometimes with cough, cough is typical, no chest pain. No d,lh, pounding.    ***Rheum note 3 weeks ago  [facility], ENT*** last week - nerve damage?  Using bipap    Sugars 90s-130, lowest in 70s  BP - 135-150/80-90s    Review of Systems   Appetite is fair (lifelong)  No ear pain, sore throat, sinus pain  Mouth is dry, over past few months, biotin helps a little  Ears are plugged up a lot  No vision changes  No nausea, vomiting, diarrhea or abdominal pain.  Appetite is poor, ate when supposed to, weight stable  No swollen lymph nodes  Bilateral hand arthritis, actually pretty good now  Easy bruising    Volatern minimal use  Flonase and antihistamine did not help with   Astelin no better, BID  Insulin - correction with meals, if over 200 take a couple units   Xopenex  rarely - BID  Melatonin rare, Zofran once a couple months    A complete ROS was otherwise negative except as noted in the HPI.    Recently started working full time as a   in [employer].    Current Outpatient Medications   Medication    acetaminophen (TYLENOL) 325 MG tablet    amLODIPine (NORVASC) 10 MG tablet    calcium carbonate 600 mg-vitamin D 400 units 600-400 MG-UNIT per tablet    diclofenac (VOLTAREN) 1 % topical gel    ferrous sulfate (FEROSUL) 325 (65 Fe) MG tablet    FLUoxetine (PROZAC) 20 MG capsule    fluticasone (FLONASE) 50 MCG/ACT nasal spray    fluticasone-salmeterol (ADVAIR) 500-50 MCG/ACT inhaler    hydroxychloroquine (PLAQUENIL) 200 MG tablet    insulin aspart (NOVOLOG PEN) 100 UNIT/ML pen    insulin pen needle (32G X 4 MM) 32G X 4 MM miscellaneous    lamoTRIgine (LAMICTAL) 25 MG tablet    levalbuterol (XOPENEX HFA) 45 MCG/ACT inhaler    levothyroxine (SYNTHROID/LEVOTHROID) 25 MCG tablet    Magnesium Glycinate POWD    Melatonin 10 MG TABS tablet    mirabegron (MYRBETRIQ) 50 MG 24 hr tablet    mirtazapine (REMERON) 7.5 MG tablet    multivitamin w/minerals (THERA-VIT-M) tablet    MYFORTIC (BRAND) 360 MG EC tablet    ondansetron (ZOFRAN-ODT) 4 MG ODT tab    pantoprazole (PROTONIX) 40 MG EC tablet    predniSONE (DELTASONE) 2.5 MG tablet    predniSONE (DELTASONE) 5 MG tablet    propranolol (INDERAL) 20 MG tablet    rOPINIRole (REQUIP) 0.25 MG tablet    rosuvastatin (CRESTOR) 10 MG tablet    senna-docusate (SENOKOT-S/PERICOLACE) 8.6-50 MG tablet    sulfamethoxazole-trimethoprim (BACTRIM) 400-80 MG tablet    tacrolimus (GENERIC EQUIVALENT) 0.5 MG capsule    tacrolimus (GENERIC EQUIVALENT) 1 MG capsule    tacrolimus (GENERIC EQUIVALENT) 1 mg/mL suspension    tamsulosin (FLOMAX) 0.4 MG capsule    voriconazole (VFEND) 200 MG tablet    voriconazole (VFEND) 50 MG tablet    warfarin ANTICOAGULANT (COUMADIN) 1 MG tablet     No current facility-administered medications for this visit.     No Known Allergies  Past Medical History:   Diagnosis Date    BRENNAN (acute kidney injury) (H) 10/17/2021    Anxiety     Depression     HLD (hyperlipidemia)     HTN (hypertension)      Hypothyroidism     ILD (interstitial lung disease) (H)     Infection due to 2019 novel coronavirus 03/2023    SALTY on CPAP     Oxygen dependent     BL 4L since ~6/2021    Primary hypertension 02/28/2022    Rheumatoid arthritis (H)     signs ~5/2020, dx 5/2021    S/P lung transplant (H) 10/16/2021    Shock liver 10/17/2021    Steroid-induced hyperglycemia     Traction bronchiectasis (H)        Past Surgical History:   Procedure Laterality Date    BRONCHOSCOPY (RIGID OR FLEXIBLE), DIAGNOSTIC N/A 1/26/2022    Procedure: BRONCHOSCOPY, WITH BRONCHOALVEOLAR LAVAGE AND BIOPSY;  Surgeon: Perlman, David Morris, MD;  Location:  GI    BRONCHOSCOPY (RIGID OR FLEXIBLE), DIAGNOSTIC N/A 4/19/2022    Procedure: BRONCHOSCOPY, WITH BRONCHOALVEOLAR LAVAGE AND BIOPSY;  Surgeon: Sherine Merrill MD;  [facility]    BRONCHOSCOPY (RIGID OR FLEXIBLE), DIAGNOSTIC N/A 6/21/2022    Procedure: BRONCHOSCOPY, WITH BRONCHOALVEOLAR LAVAGE AND BIOPSY;  Surgeon: Aneesh Rubio MD;  Location:  GI    COLONOSCOPY W/ BIOPSIES AND POLYPECTOMY  07/21/2020    CV CORONARY ANGIOGRAM N/A 09/08/2021    Procedure: Coronary Angiogram with possible intervention;  Surgeon: Jovon Bullock MD;  Location:  HEART CARDIAC CATH LAB    CV RIGHT HEART CATH MEASUREMENTS RECORDED N/A 09/08/2021    Procedure: Right Heart Cath;  Surgeon: Jovon Bullock MD;  Location:  HEART CARDIAC CATH LAB    ESOPHAGOSCOPY, GASTROSCOPY, DUODENOSCOPY (EGD), COMBINED N/A 10/23/2021    Procedure: ESOPHAGOGASTRODUODENOSCOPY (EGD);  Surgeon: Miquel Pisano MD;  [facility]    ESOPHAGOSCOPY, GASTROSCOPY, DUODENOSCOPY (EGD), COMBINED N/A 11/02/2021    Procedure: ESOPHAGOGASTRODUODENOSCOPY (EGD);  Surgeon: Daniel Ortiz MD;  [facility]    ESOPHAGOSCOPY, GASTROSCOPY, DUODENOSCOPY (EGD), COMBINED N/A 11/5/2021    Procedure: ESOPHAGOGASTRODUODENOSCOPY (EGD);  Surgeon: Ronnell Hernandez MD;  Location:  GI    EXTRACTION(S) DENTAL N/A  09/22/2021    Procedure: EXTRACTION tooth #19;  Surgeon: Deepak Tobin DDS;  [facility]    HERNIA REPAIR      IR CHEST TUBE PLACEMENT NON-TUNNELLED LEFT  11/03/2021    PICC TRIPLE LUMEN PLACEMENT Left 11/04/2021    5FR TL PICC. Right non occlusive thrombus subclavian vein.    REPLACE GASTROJEJUNOSTOMY TUBE, PERCUTANEOUS N/A 11/9/2021    Procedure: REPLACEMENT, GASTROJEJUNOSTOMY TUBE, PERCUTANEOUS;  Surgeon: Hernando Rodriguez MD;  [facility]    right acl      TRACHEOSTOMY PERCUTANEOUS N/A 10/29/2021    Procedure: Percutaneous Tracheostomy,;  Surgeon: Celine Jenkins MD;  [facility]    TRANSPLANT LUNG RECIPIENT SINGLE X2 Bilateral 10/16/2021    Procedure: TRANSPLANT, LUNG, RECIPIENT, BILATERAL, Bronchoscopy, on-pump perfusion, bilateral clamshell sternotomy;  Surgeon: Yanick Corral MD;  [facility]    XR ACROMIOCLAVICULAR JOINT BILATERAL         Social History     Socioeconomic History    Marital status: Single     Spouse name: Not on file    Number of children: Not on file    Years of education: Not on file    Highest education level: Not on file   Occupational History    Not on file   Tobacco Use    Smoking status: Never    Smokeless tobacco: Never   Substance and Sexual Activity    Alcohol use: Never    Drug use: Never    Sexual activity: Not on file   Other Topics Concern    Parent/sibling w/ CABG, MI or angioplasty before 65F 55M? Not Asked   Social History Narrative    Full time  for the [employer] starting May 24, 2023.     Social Determinants of Health     Financial Resource Strain: Not on file   Food Insecurity: Not on file   Transportation Needs: Not on file   Physical Activity: Not on file   Stress: Not on file   Social Connections: Not on file   Intimate Partner Violence: Not on file   Housing Stability: Not on file     ***  Exam:   GENERAL APPEARANCE: Well developed, well nourished, alert, and in no apparent distress.  EYES: PERRL, EOMI  HENT: Nasal  mucosa with no edema and no hyperemia. No nasal polyps.  EARS: Canals clear, TMs normal  MOUTH: Oral mucosa is moist, without any lesions, no tonsillar enlargement, no oropharyngeal exudate.  NECK: supple, no masses, no thyromegaly.  LYMPHATICS: No significant axillary, cervical, or supraclavicular nodes.  RESP: normal percussion, good air flow throughout.  No crackles. No rhonchi. Faint wheeze left base   CV: Normal S1, S2, regular rhythm, normal rate. No murmur.  No rub. No gallop. No LE edema.   ABDOMEN:  Bowel sounds normal, soft, nontender, no HSM or masses.   MS: extremities normal. No clubbing. No cyanosis.  SKIN: no rash on limited exam  NEURO: Mentation intact, speech normal, normal strength and tone, normal gait and stance  PSYCH: mentation appears normal. and affect normal/bright  Results:  Recent Results (from the past 168 hour(s))   CBC with Platelets & Differential    Collection Time: 07/28/23  7:16 AM   Result Value Ref Range    WBC Count (External) 7.7 4.0 - 11.0 K/uL    RBC Count (External) 4.54 4.40 - 5.80 M/UL    Hemoglobin (External) 10.7 (L) 13.5 - 17.5 g/dL    Hematocrit (External) 36.1 (L) 40.0 - 50.0 %    MCV (External) 79.5 (L) 80.0 - 98.0 fL    MCH (External) 23.6 (L) 25.5 - 34.0 pg    MCHC (External) 29.6 (L) 31.5 - 36.5 g/dL    RDW (External) 17.6 (H) 11.5 - 15.5 %    Platelet Count (External) 219 140 - 400 K/uL    Absolute Neutrophils (External) 5.0 1.8 - 8.0 K/uL    Absolute Bands (External) 0.5 0.0 - 0.7 K/uL    Absolute Lymphocytes (External) 1.2 0.8 - 4.1 K/uL    Absolute Monocytes (External) 0.8 K/uL    Absolute Eosinophils (External) 0.2 K/uL    % Neutrophils (External) 65.0 %    % Bands (External) 7.0 %    % Lymphocytes (External) 16.0 %    % Monocytes (External) 10.0 %    % Eosinophils (External) 2.0 %   Basic metabolic panel    Collection Time: 07/28/23  7:16 AM   Result Value Ref Range    Glucose (External) 107 (H) 70 - 99 mg/dL    Urea Nitrogen (External) 21 6 - 22 mg/dL     Creatinine (External) 1.04 0.73 - 1.18 mg/dL    BUN/Creatinine Ratio (External) 20.2 10.0 - 25.0    Sodium (External) 141 136 - 145 meq/L    Potassium (External) 4.8 3.5 - 5.1 meq/L    Chloride (External) 110 (H) 98 - 109 meq/L    CO2 (External) 26 22 - 31 meq/L    Anion Gap (External) 10 6 - 20 meq/L    Calcium (External) 9.0 8.5 - 10.5 mg/dL    GFR Estimated (External) 83 >=60 ml/min/1.73m2   INR    Collection Time: 07/28/23  7:16 AM   Result Value Ref Range    PT - Prothrombine Time (External) 23.9 (H) 12.0 - 14.5 secs    INR (External) 2.1 (H) 0.9 - 1.1   Tacrolimus by Tandem Mass Spectrometry    Collection Time: 07/28/23  7:16 AM   Result Value Ref Range    Tacrolimus by Tandem Mass Spectrometry 6.4 5.0 - 15.0 ug/L    Tacrolimus Last Dose Date 7/27/2023     Tacrolimus Last Dose Time  7:30 PM        Results as noted above.    PFT Interpretation: ***  Moderate obstructive ventilatory defect.  Unchanged from previous.   Below recent best, but similar to baseline.  Valid Maneuver              Transplant Coordinator Note    Reason for visit: Post lung transplant follow up visit   Coordinator: Present   Caregiver:  daughter     Health concerns addressed today:  1. Post hospital follow up today. PT is feeling better.   2. Breathing had been better.   3.  Will check IgG level today.   4. Dry mouth. Uses biotene.   5. IgG pending- may need replacement.   6. ENT Referral for plugged ears and congestion.   7. Tacrolimus and Vori taken at a 16 hr level. Pt will recheck tomorrow. Will send orders for Voris levels.     Activity/rehab: pt walks often at work.   Oxygen needs: none   Pain management/RX: Tylenol for pain.   Diabetic management: checking BG's       COVID:  COVID-19 infection (yes/no, date of most recent positive test):   Status/instructions given about COVID-19 vaccine:     Pt Education: medications (use/dose/side effects), how/when to call coordinator, frequency of labs, s/s of infection/rejection, call prior to  starting any new medications, lab/vital sign book    Health Maintenance:   Last colonoscopy:   Next colonoscopy due:   Dermatology:  Vaccinations this visit:     Labs, CXR, PFTs reviewed with patient  Medication record reviewed and reconciled  Questions and concerns addressed    Patient Instructions  1. Continue to hydrate with 60-70 oz fluids daily.  2. Continue to exercise daily or most days of the week.  3. Follow up with your primary care provider for annual gender health maintenance procedures.  4. Follow up with colonoscopy schedule.  5. Follow up with annual dermatology visits.  6. It doesn't seem like the COVID vaccine is working well in lung transplant patients. A number of lung transplant patients have gotten sick with COVID even after receiving the vaccines. Based on our recent experience, it can be life-threatening to get COVID  even after being vaccinated. Please continue to act like you did not get the COVID vaccine - social distancing, wearing a mask, good hand hygiene, etc. If the people around you are vaccinated, it will help reduce the risk of you getting COVID. All members of your household should be vaccinated.  7. PFT's improved.   8. Continue the Xopenex as needed.     Next transplant clinic appointment:  3 months with CXR, labs and PFTs  Next lab draw: have a tacrolimus level and Vori level done tomorrow at a 12 hour draw.     AVS printed at time of check out          Again, thank you for allowing me to participate in the care of your patient.        Sincerely,        Leticia Gordon MD

## 2023-08-01 NOTE — RESULT ENCOUNTER NOTE
Tarolimus level 6.4 on 7/28. No change in dose, patient getting repeat labs drawn today for accurate 12 hour level.

## 2023-08-01 NOTE — NURSING NOTE
"Chief Complaint   Patient presents with    RECHECK     S/P Lung TX 10/16/2021     Vital signs:  Temp: 98  F (36.7  C) Temp src: Oral BP: 135/82 Pulse: 70   Resp: 14 SpO2: 96 % (RA)     Height: 163.8 cm (5' 4.5\") Weight: 68.5 kg (151 lb)  Estimated body mass index is 25.52 kg/m  as calculated from the following:    Height as of this encounter: 1.638 m (5' 4.5\").    Weight as of this encounter: 68.5 kg (151 lb).      Maria Elena Zamarripa, [facility]  8/1/2023 12:50 PM    "

## 2023-08-02 ENCOUNTER — LAB (OUTPATIENT)
Dept: LAB | Facility: CLINIC | Age: 58
End: 2023-08-02
Payer: COMMERCIAL

## 2023-08-02 ENCOUNTER — ANTICOAGULATION THERAPY VISIT (OUTPATIENT)
Dept: ANTICOAGULATION | Facility: CLINIC | Age: 58
End: 2023-08-02
Payer: COMMERCIAL

## 2023-08-02 DIAGNOSIS — Z94.2 LUNG REPLACED BY TRANSPLANT (H): Primary | ICD-10-CM

## 2023-08-02 DIAGNOSIS — Z79.899 ENCOUNTER FOR LONG-TERM (CURRENT) USE OF HIGH-RISK MEDICATION: ICD-10-CM

## 2023-08-02 DIAGNOSIS — E83.42 HYPOMAGNESEMIA: ICD-10-CM

## 2023-08-02 DIAGNOSIS — Z94.2 LUNG REPLACED BY TRANSPLANT (H): ICD-10-CM

## 2023-08-02 DIAGNOSIS — I48.91 ATRIAL FIBRILLATION, UNSPECIFIED TYPE (H): Primary | ICD-10-CM

## 2023-08-02 LAB
CMV DNA SPEC NAA+PROBE-ACNC: NOT DETECTED IU/ML
EBV DNA # SPEC NAA+PROBE: NOT DETECTED COPIES/ML

## 2023-08-02 PROCEDURE — 80197 ASSAY OF TACROLIMUS: CPT

## 2023-08-02 RX ORDER — MAGNESIUM GLYCINATE 100 MG
300 CAPSULE ORAL 3 TIMES DAILY
Qty: 270 CAPSULE | Refills: 11 | Status: SHIPPED | OUTPATIENT
Start: 2023-08-02 | End: 2023-08-09

## 2023-08-02 NOTE — PROGRESS NOTES
ANTICOAGULATION MANAGEMENT     Edson Thornton Jr. 58 year old male is on warfarin with therapeutic INR result. (Goal INR 2.0-3.0)    Recent labs: (last 7 days)     08/02/23  0715   INR 2.0*       ASSESSMENT     Source(s): Chart Review and Patient/Caregiver Call     Warfarin doses taken: Warfarin taken as instructed  Diet: No new diet changes identified  Medication/supplement changes: None noted  New illness, injury, or hospitalization: No  Signs or symptoms of bleeding or clotting: No  Previous result: Therapeutic last 2(+) visits  Additional findings: None       PLAN     Recommended plan for no diet, medication or health factor changes affecting INR     Dosing Instructions: Continue your current warfarin dose with next INR in 10 days       Summary  As of 8/2/2023      Full warfarin instructions:  3 mg every Sun, Tue, Thu; 2 mg all other days   Next INR check:  8/11/2023               Telephone call with Bret who verbalizes understanding and agrees to plan    Patient using outside facility for labs    Education provided:   Goal range and lab monitoring: goal range and significance of current result    Plan made per [facility] anticoagulation protocol    Dionne Lopez RN  Anticoagulation Clinic  8/2/2023    _______________________________________________________________________     Anticoagulation Episode Summary       Current INR goal:  2.0-3.0   TTR:  83.4 % (11.7 mo)   Target end date:  Indefinite   Send INR reminders to:  [facility]    Indications    Atrial fibrillation  unspecified type (H) [I48.91]  Encounter for long-term (current) use of high-risk medication [Z79.899]             Comments:               Anticoagulation Care Providers       Provider Role Specialty Phone number    Abiodun Woods MD Referring Pulmonary Disease [phone]

## 2023-08-03 LAB
1,3 BETA GLUCAN SER-MCNC: 479 PG/ML
GALACTOMANNAN AG SERPL QL IA: NEGATIVE
GALACTOMANNAN AG SPEC IA-ACNC: 0.15
Lab: NORMAL
OBSERVATION IMP: POSITIVE
PERFORMING LABORATORY: NORMAL
TEST NAME: NORMAL

## 2023-08-04 LAB
DONOR IDENTIFICATION: NORMAL
DSA COMMENTS: NORMAL
DSA PRESENT: NO
DSA TEST METHOD: NORMAL
MISCELLANEOUS TEST 1 (ARUP): NORMAL
ORGAN: NORMAL
SA 1 CELL: NORMAL
SA 1 TEST METHOD: NORMAL
SA 2 CELL: NORMAL
SA 2 TEST METHOD: NORMAL
SA1 HI RISK ABY: NORMAL
SA1 MOD RISK ABY: NORMAL
SA2 HI RISK ABY: NORMAL
SA2 MOD RISK ABY: NORMAL
TACROLIMUS BLD-MCNC: 9.5 UG/L (ref 5–15)
TME LAST DOSE: NORMAL H
TME LAST DOSE: NORMAL H
UNACCEPTABLE ANTIGENS: NORMAL
UNOS CPRA: 31
ZZZSA 1  COMMENTS: NORMAL
ZZZSA 2 COMMENTS: NORMAL

## 2023-08-08 DIAGNOSIS — I48.91 ATRIAL FIBRILLATION, UNSPECIFIED TYPE (H): ICD-10-CM

## 2023-08-08 DIAGNOSIS — Z94.2 S/P LUNG TRANSPLANT (H): ICD-10-CM

## 2023-08-08 RX ORDER — WARFARIN SODIUM 1 MG/1
TABLET ORAL
Qty: 90 TABLET | Refills: 1 | Status: SHIPPED | OUTPATIENT
Start: 2023-08-08 | End: 2023-08-11

## 2023-08-08 RX ORDER — PREDNISONE 2.5 MG/1
2.5 TABLET ORAL EVERY EVENING
Qty: 30 TABLET | Refills: 11 | Status: SHIPPED | OUTPATIENT
Start: 2023-08-08 | End: 2024-08-02

## 2023-08-09 ENCOUNTER — TELEPHONE (OUTPATIENT)
Dept: TRANSPLANT | Facility: CLINIC | Age: 58
End: 2023-08-09
Payer: COMMERCIAL

## 2023-08-09 DIAGNOSIS — Z94.2 S/P LUNG TRANSPLANT (H): ICD-10-CM

## 2023-08-09 DIAGNOSIS — Z94.2 LUNG REPLACED BY TRANSPLANT (H): ICD-10-CM

## 2023-08-09 DIAGNOSIS — E83.42 HYPOMAGNESEMIA: ICD-10-CM

## 2023-08-09 DIAGNOSIS — Z94.2 S/P LUNG TRANSPLANT (H): Primary | ICD-10-CM

## 2023-08-09 RX ORDER — MAGNESIUM GLYCINATE 100 MG
400 CAPSULE ORAL 2 TIMES DAILY
Qty: 240 CAPSULE | Refills: 11 | Status: ON HOLD | OUTPATIENT
Start: 2023-08-09 | End: 2023-11-15

## 2023-08-09 NOTE — TELEPHONE ENCOUNTER
Patient Call: General  Route to LPN    Reason for call: Bret wanting to Clarify new orders for his medication Magnesium,       Call back needed? Yes    Return Call Needed  Same as documented in contacts section  When to return call?: Same day: Route High Priority

## 2023-08-09 NOTE — LETTER
PHYSICIAN ORDERS      DATE & TIME ISSUED: 2023 12:06 PM  PATIENT NAME: Edson Thornton Jr.   : 1965     [facility] MR# [if applicable]: [id number]     DIAGNOSIS:  Lung Transplant  Z94.2    Per conversation with mari Perez at [facility]:   Patients advair inhaler has been discontinued due to interaction with Voriconazole.   New order for Magnesium Glycinate sent over.     Any questions please call: Alfred [phone]    Please fax these results to [phone].      .

## 2023-08-09 NOTE — PROGRESS NOTES
Outside pharmacist called concerned about interaction between voriconazole and advair inhaler.   Also reported they only carry 200mg capsules of Mag Glycinate, patients current Rx for 900mg daily.     Reviewed with [facility] pharmacist and Dr. Mckeon:   -change Mag dosing to 400mg BID  -okay to change advair inhaler to Dulera     Pt updated with plan

## 2023-08-10 ENCOUNTER — TELEPHONE (OUTPATIENT)
Dept: TRANSPLANT | Facility: CLINIC | Age: 58
End: 2023-08-10
Payer: COMMERCIAL

## 2023-08-10 RX ORDER — FLUTICASONE FUROATE AND VILANTEROL 200; 25 UG/1; UG/1
1 POWDER RESPIRATORY (INHALATION) DAILY
Qty: 28 EACH | Refills: 1 | Status: SHIPPED | OUTPATIENT
Start: 2023-08-10 | End: 2023-10-03

## 2023-08-10 NOTE — TELEPHONE ENCOUNTER
Prior Authorization Not Needed per Insurance    Medication: DULERA 200-5 MCG/ACT IN AERO  Insurance Company:  [employer]    Pharmacy Filling the Rx:    Pharmacy Notified: No  Patient Notified: No  Per Alfred coordinator will be switching to the breo test claim=$55  Alfred will call patient and make aware

## 2023-08-10 NOTE — TELEPHONE ENCOUNTER
Insurance does not cover Dulera inhaler. Reviewed with [facility], will switch to breo-ellipta while taking voriconazole.

## 2023-08-10 NOTE — TELEPHONE ENCOUNTER
Prior Authorization Retail Medication Request    Medication/Dose: Dulera 200-5 mcg/act  ICD code (if different than what is on RX):  z94.2 Lung Transplant, SOB  Previously Tried and Failed:    Rationale:      COver My Meds  [id number]      Pharmacy Information (if different than what is on RX)  Name:   Phone:

## 2023-08-11 ENCOUNTER — LAB (OUTPATIENT)
Dept: LAB | Facility: CLINIC | Age: 58
End: 2023-08-11
Payer: COMMERCIAL

## 2023-08-11 DIAGNOSIS — I48.91 ATRIAL FIBRILLATION, UNSPECIFIED TYPE (H): ICD-10-CM

## 2023-08-11 DIAGNOSIS — Z94.2 S/P LUNG TRANSPLANT (H): Primary | ICD-10-CM

## 2023-08-11 DIAGNOSIS — Z94.2 LUNG REPLACED BY TRANSPLANT (H): ICD-10-CM

## 2023-08-11 PROCEDURE — 80197 ASSAY OF TACROLIMUS: CPT | Performed by: STUDENT IN AN ORGANIZED HEALTH CARE EDUCATION/TRAINING PROGRAM

## 2023-08-11 RX ORDER — WARFARIN SODIUM 1 MG/1
TABLET ORAL
Qty: 90 TABLET | Refills: 1 | Status: SHIPPED | OUTPATIENT
Start: 2023-08-11

## 2023-08-13 ENCOUNTER — HEALTH MAINTENANCE LETTER (OUTPATIENT)
Age: 58
End: 2023-08-13

## 2023-08-14 ENCOUNTER — ANTICOAGULATION THERAPY VISIT (OUTPATIENT)
Dept: ANTICOAGULATION | Facility: CLINIC | Age: 58
End: 2023-08-14
Payer: COMMERCIAL

## 2023-08-14 DIAGNOSIS — Z79.899 ENCOUNTER FOR LONG-TERM (CURRENT) USE OF HIGH-RISK MEDICATION: ICD-10-CM

## 2023-08-14 DIAGNOSIS — I48.91 ATRIAL FIBRILLATION, UNSPECIFIED TYPE (H): Primary | ICD-10-CM

## 2023-08-14 LAB
EXPTIME-PRE: 9.71 SEC
FEF2575-%PRED-PRE: 30 %
FEF2575-PRE: 0.77 L/SEC
FEF2575-PRED: 2.57 L/SEC
FEFMAX-%PRED-PRE: 61 %
FEFMAX-PRE: 5.07 L/SEC
FEFMAX-PRED: 8.17 L/SEC
FEV1-%PRED-PRE: 55 %
FEV1-PRE: 1.58 L
FEV1FEV6-PRE: 65 %
FEV1FEV6-PRED: 79 %
FEV1FVC-PRE: 64 %
FEV1FVC-PRED: 80 %
FIFMAX-PRE: 4.21 L/SEC
FVC-%PRED-PRE: 69 %
FVC-PRE: 2.49 L
FVC-PRED: 3.57 L
TACROLIMUS BLD-MCNC: 12.2 UG/L (ref 5–15)
TME LAST DOSE: NORMAL H
TME LAST DOSE: NORMAL H

## 2023-08-14 NOTE — PROGRESS NOTES
Addendum 8/14/23 Spoke with patient and went over INR results and dosing. Patient denies any changes to diet, medication, health, bleeding, or any upcoming procedures. Patient communicated understanding of continuing Warfarin 3mg every Sun, Tue, Thu, and 2mg all other days. Patient will get labs at his local lab on 8/18.    Suni Choi RN

## 2023-08-14 NOTE — PROGRESS NOTES
ANTICOAGULATION MANAGEMENT     Edson Thornton Jr. 58 year old male is on warfarin with therapeutic INR result. (Goal INR 2.0-3.0)    Recent labs: (last 7 days)     08/11/23  0732   INR 2.0*       ASSESSMENT     Source(s): Chart Review  Previous INR was Therapeutic last 2(+) visits  Medication, diet, health changes since last INR chart reviewed; none identified         PLAN     Recommended plan for no diet, medication or health factor changes affecting INR     Dosing Instructions: Continue your current warfarin dose with next INR in 1 week       Summary  As of 8/14/2023      Full warfarin instructions:  3 mg every Sun, Tue, Thu; 2 mg all other days   Next INR check:  8/18/2023               Detailed voice message left for Bret with dosing instructions and follow up date.     Patient using outside facility for labs    Education provided:   Please call back if any changes to your diet, medications or how you've been taking warfarin  Contact [phone] with any changes, questions or concerns.     Plan made per [facility] anticoagulation protocol    Suni Choi RN  Anticoagulation Clinic  8/14/2023    _______________________________________________________________________     Anticoagulation Episode Summary       Current INR goal:  2.0-3.0   TTR:  83.2 % (11.6 mo)   Target end date:  Indefinite   Send INR reminders to:  [facility]    Indications    Atrial fibrillation  unspecified type (H) [I48.91]  Encounter for long-term (current) use of high-risk medication [Z79.899]             Comments:               Anticoagulation Care Providers       Provider Role Specialty Phone number    Abiodun Woods MD Referring Pulmonary Disease [phone]

## 2023-08-15 ENCOUNTER — TELEPHONE (OUTPATIENT)
Dept: TRANSPLANT | Facility: CLINIC | Age: 58
End: 2023-08-15
Payer: COMMERCIAL

## 2023-08-15 DIAGNOSIS — Z94.2 LUNG REPLACED BY TRANSPLANT (H): ICD-10-CM

## 2023-08-15 DIAGNOSIS — D84.9 IMMUNOSUPPRESSED STATUS (H): ICD-10-CM

## 2023-08-15 RX ORDER — TACROLIMUS 0.5 MG/1
0.5 CAPSULE ORAL 2 TIMES DAILY
Qty: 60 CAPSULE | Refills: 11 | Status: SHIPPED | OUTPATIENT
Start: 2023-08-15 | End: 2023-08-22

## 2023-08-15 RX ORDER — TACROLIMUS 1 MG/1
CAPSULE ORAL
Qty: 30 CAPSULE | Refills: 11 | Status: SHIPPED | OUTPATIENT
Start: 2023-08-15 | End: 2023-08-15

## 2023-08-15 NOTE — TELEPHONE ENCOUNTER
Patient Call: Medication Clarification  Route to LPN  Name of Medication: Tacrolimus  Dose: 1 mg  Pharmacy Name: [facility]   Pharmacy Location: [address] [place] [address] [place]  Call back needed? Yes    Return Call Needed  Same as documented in contacts section  When to return call?: Same day: Route High Priority

## 2023-08-15 NOTE — TELEPHONE ENCOUNTER
Clarified with pharmacist that 1 mg capsule is not needed, dose lowered to 0.5 mg bid today. They will delete Rx for 1 mg capsule for now.

## 2023-08-15 NOTE — TELEPHONE ENCOUNTER
Tacrolimus level 12.2 at 11.5 hours, on 8/11/23.  Goal 8-10.   Current dose 0.5 mg in AM, 1 mg in PM    Dose changed to 0.5 mg in AM, 0.5 mg in PM   Recheck level in 7-10 days.    Discussed with pt.

## 2023-08-18 ENCOUNTER — TELEPHONE (OUTPATIENT)
Dept: ANTICOAGULATION | Facility: CLINIC | Age: 58
End: 2023-08-18
Payer: COMMERCIAL

## 2023-08-18 ENCOUNTER — LAB (OUTPATIENT)
Dept: LAB | Facility: CLINIC | Age: 58
End: 2023-08-18
Payer: COMMERCIAL

## 2023-08-18 DIAGNOSIS — I48.91 ATRIAL FIBRILLATION, UNSPECIFIED TYPE (H): Primary | ICD-10-CM

## 2023-08-18 DIAGNOSIS — I63.9 ISCHEMIC STROKE (H): ICD-10-CM

## 2023-08-18 DIAGNOSIS — Z79.899 ENCOUNTER FOR LONG-TERM (CURRENT) USE OF HIGH-RISK MEDICATION: ICD-10-CM

## 2023-08-18 DIAGNOSIS — Z94.2 LUNG REPLACED BY TRANSPLANT (H): Primary | ICD-10-CM

## 2023-08-18 PROCEDURE — 80197 ASSAY OF TACROLIMUS: CPT | Performed by: STUDENT IN AN ORGANIZED HEALTH CARE EDUCATION/TRAINING PROGRAM

## 2023-08-18 NOTE — LETTER
[facility] ANTICOAGULATION CLINIC  [address] [place] [address]  Phone: [phone]  Fax: [phone]        Patient:     Edson Thornton Jr. MRN:  [id number]   [address]  [place] [address] :  1965  Sex:  M   Phone: [phone]        INSURANCE PAYOR PLAN GROUP # SUBSCRIBER ID   Primary:    [facility] 1442 [id number]      No Known Allergies     Order Date:  Aug 18, 2023  INR       (Order ID: [id number])     Diagnosis:  Atrial fibrillation, unspecified type (H) (I48.91)  Encounter for long-term (current) use of high-risk medication (Z79.899)  Ischemic stroke (H) (I63.9)   Priority:  Routine Expiration Date:  2024 Interval:  every 1-6 weeks and PRN Count: 99      Ordered by: Abiodun Woods MD   (NPI: [id number])  Authorized by:  Abiodun Woods MD   (NPI: [id number])

## 2023-08-18 NOTE — TELEPHONE ENCOUNTER
Incoming call from Bret, his [facility] is requiring a new order for INR checks.   Faxed to # below. Bret is aware.

## 2023-08-18 NOTE — TELEPHONE ENCOUNTER
Zay wanted another faxed INR Order for venous as they donot do POCT>  PK- RN to do this today    Dionne Lopez RN  Anticoagulation Nurse  [facility]  [phone]

## 2023-08-18 NOTE — TELEPHONE ENCOUNTER
FYI - Status Update    Who is Calling: patient    Update: Lab fax number for inr for the patient / phone for lab     Does caller want a call/response back: No

## 2023-08-18 NOTE — LETTER
[facility] ANTICOAGULATION CLINIC  [address] [place] [id number]  Phone: [phone]  Fax: [phone]       Patient:     Edson Thornton Jr. MRN:  [id number]   [address]  [place] [address] :  1965  Sex:  M   Phone: [phone]        INSURANCE PAYOR PLAN GROUP # SUBSCRIBER ID   Primary:  Secondary:    [employer] 1919 [id number]  [id number]  [id number]      No Known Allergies     Order Date:  2023  INR point of care       (Order ID: [id number])     Diagnosis:  Atrial fibrillation, unspecified type (H) (I48.91)  Encounter for long-term (current) use of high-risk medication (Z79.899)   Priority:  Routine Expiration Date:  2024 Interval:  every 1-6 weeks & PRN Count: 99      Ordered by: Abiodun Woods MD (NPI: [id number])   Authorized by:  Abiodun Woods MD   (NPI: [id number])

## 2023-08-21 ENCOUNTER — TELEPHONE (OUTPATIENT)
Dept: TRANSPLANT | Facility: CLINIC | Age: 58
End: 2023-08-21
Payer: COMMERCIAL

## 2023-08-21 DIAGNOSIS — Z94.2 LUNG REPLACED BY TRANSPLANT (H): ICD-10-CM

## 2023-08-21 DIAGNOSIS — D84.9 IMMUNOSUPPRESSED STATUS (H): ICD-10-CM

## 2023-08-21 LAB
TACROLIMUS BLD-MCNC: 12 UG/L (ref 5–15)
TME LAST DOSE: NORMAL H
TME LAST DOSE: NORMAL H

## 2023-08-21 NOTE — TELEPHONE ENCOUNTER
Tacrolimus level 12 at 12 hours, on 8/18/23.  Goal 8-10.   Current dose 0.5 mg in AM, 0.5 mg in PM    Dose changed to 0.4 mg in AM, 0.4 mg in PM of liquid tacrolimus  Recheck level Friday this week     Discussed with patient   MyChart message sent

## 2023-08-22 ENCOUNTER — TELEPHONE (OUTPATIENT)
Dept: TRANSPLANT | Facility: CLINIC | Age: 58
End: 2023-08-22
Payer: COMMERCIAL

## 2023-08-22 NOTE — TELEPHONE ENCOUNTER
Patient Call: General  Route to LPN    Reason for call: Bret would like a call back regarding Tacrolimus level    Call back needed? Yes    Return Call Needed  Same as documented in contacts section  When to return call?: Greater than one day: Route standard priority

## 2023-08-22 NOTE — TELEPHONE ENCOUNTER
Pt calls stating liquid tac is no longer covered by insurance. Now costs $60.00 for 30 days Rx. Will reach out to pharmacy to see if there is anything can be done to lower cost.

## 2023-08-24 NOTE — PROGRESS NOTES
Mrs. Aiken  presents with a one-week history of hematochezia.  When she wipes after having a bowel movement, she is seeing very dark, she reports black, stool and then it turns red on the toilet paper.  She is still having 1 formed stool on a daily basis.  She has had history of GI bleed in the past.  Colonoscopy in June of 2022 did not show any AVMs or signs of active bleeding.  Sigmoidoscopy in April of this year after she was seen bright red blood she had hemorrhoids but was otherwise unremarkable.  She denies any bright red blood dripping into the toilet.  She denies any abdominal pain or vomiting.  She received IV iron infusions spring.  She saw her  hematologist in June and lab was drawn, but no iron infusions for follow-up has been scheduled yet.  She is unsure what her counts were.  She has Meniere's disease, but she denies any further dizziness than her baseline.  she is taking Xarelto for her atrial fibrillation.  She has been able to hold this for 72 hours prior to other procedures.  ANTICOAGULATION MANAGEMENT     Edson Thornton 57 year old male is on warfarin with therapeutic INR result. (Goal INR 2.0-3.0)    Recent labs: (last 7 days)     12/16/22  0940   INR 2.2*       ASSESSMENT       Source(s): Patient/Caregiver Call       Warfarin doses taken: Warfarin taken as instructed    Diet: No new diet changes identified    New illness, injury, or hospitalization: No    Medication/supplement changes: None noted    Signs or symptoms of bleeding or clotting: No    Previous INR: Therapeutic last 2(+) visits    Additional findings: None       PLAN     Recommended plan for no diet, medication or health factor changes affecting INR     Dosing Instructions: Continue your current warfarin dose with next INR in 4 days       Summary  As of 12/19/2022    Full warfarin instructions:  5 mg every Tue, Fri; 4 mg all other days; Starting 12/19/2022   Next INR check:  12/20/2022             Telephone call with Bert who verbalizes understanding and agrees to plan and who agrees to plan and repeated back plan correctly    Lab visit scheduled    Education provided:     None required    Plan made per [facility] anticoagulation protocol    Suni Choi RN  Anticoagulation Clinic  12/19/2022    _______________________________________________________________________     Anticoagulation Episode Summary     Current INR goal:  2.0-3.0   TTR:  59.5 % (11.4 mo)   Target end date:  Indefinite   Send INR reminders to:  [facility]    Indications    Atrial fibrillation  unspecified type (H) [I48.91]           Comments:           Anticoagulation Care Providers     Provider Role Specialty Phone number    Abiodun Woods MD Referring Pulmonary Disease [phone]

## 2023-08-25 ENCOUNTER — ANTICOAGULATION THERAPY VISIT (OUTPATIENT)
Dept: ANTICOAGULATION | Facility: CLINIC | Age: 58
End: 2023-08-25
Payer: COMMERCIAL

## 2023-08-25 ENCOUNTER — LAB (OUTPATIENT)
Dept: LAB | Facility: CLINIC | Age: 58
End: 2023-08-25
Payer: COMMERCIAL

## 2023-08-25 DIAGNOSIS — Z94.2 LUNG REPLACED BY TRANSPLANT (H): Primary | ICD-10-CM

## 2023-08-25 DIAGNOSIS — Z79.899 ENCOUNTER FOR LONG-TERM (CURRENT) USE OF HIGH-RISK MEDICATION: ICD-10-CM

## 2023-08-25 DIAGNOSIS — I48.91 ATRIAL FIBRILLATION, UNSPECIFIED TYPE (H): Primary | ICD-10-CM

## 2023-08-25 LAB — INR (EXTERNAL): 2.1 (ref 0.9–1.1)

## 2023-08-25 PROCEDURE — 80197 ASSAY OF TACROLIMUS: CPT | Performed by: INTERNAL MEDICINE

## 2023-08-25 NOTE — PROGRESS NOTES
ANTICOAGULATION MANAGEMENT     Edson Thornton Jr. 58 year old male is on warfarin with therapeutic INR result. (Goal INR 2.0-3.0)    Recent labs: (last 7 days)     08/25/23  0000   INR 2.1*       ASSESSMENT     Source(s): Chart Review and Patient/Caregiver Call     Warfarin doses taken: Warfarin taken as instructed  Diet: No new diet changes identified  Medication/supplement changes: None noted  New illness, injury, or hospitalization: No  Signs or symptoms of bleeding or clotting: No  Previous result: Therapeutic last 2(+) visits  Additional findings: None       PLAN     Recommended plan for no diet, medication or health factor changes affecting INR     Dosing Instructions: Continue your current warfarin dose with next INR in 1 week       Summary  As of 8/25/2023      Full warfarin instructions:  3 mg every Sun, Tue, Thu; 2 mg all other days   Next INR check:  9/1/2023               Telephone call with Bret who verbalizes understanding and agrees to plan    Patient using outside facility for labs    Education provided:   Contact [phone] with any changes, questions or concerns.     Plan made per [facility] anticoagulation protocol    Mesha Chau RN  Anticoagulation Clinic  8/25/2023    _______________________________________________________________________     Anticoagulation Episode Summary       Current INR goal:  2.0-3.0   TTR:  83.4 % (11.7 mo)   Target end date:  Indefinite   Send INR reminders to:  [facility]    Indications    Atrial fibrillation  unspecified type (H) [I48.91]  Encounter for long-term (current) use of high-risk medication [Z79.899]             Comments:               Anticoagulation Care Providers       Provider Role Specialty Phone number    Abiodun Woods MD Referring Pulmonary Disease [phone]

## 2023-08-28 ENCOUNTER — TELEPHONE (OUTPATIENT)
Dept: TRANSPLANT | Facility: CLINIC | Age: 58
End: 2023-08-28
Payer: COMMERCIAL

## 2023-08-28 DIAGNOSIS — Z94.2 LUNG REPLACED BY TRANSPLANT (H): ICD-10-CM

## 2023-08-28 LAB
TACROLIMUS BLD-MCNC: 6.5 UG/L (ref 5–15)
TME LAST DOSE: NORMAL H
TME LAST DOSE: NORMAL H

## 2023-08-28 RX ORDER — MYCOPHENOLIC ACID 360 MG/1
360 TABLET, DELAYED RELEASE ORAL 2 TIMES DAILY
Qty: 180 TABLET | Refills: 3 | Status: SHIPPED | OUTPATIENT
Start: 2023-08-28 | End: 2023-11-16 | Stop reason: DRUGHIGH

## 2023-08-29 DIAGNOSIS — D84.9 IMMUNOSUPPRESSED STATUS (H): ICD-10-CM

## 2023-08-29 DIAGNOSIS — Z94.2 LUNG REPLACED BY TRANSPLANT (H): ICD-10-CM

## 2023-08-29 DIAGNOSIS — H93.8X3 SENSATION OF PLUGGED EAR ON BOTH SIDES: Primary | ICD-10-CM

## 2023-08-29 RX ORDER — MYCOPHENOLIC ACID 360 MG/1
360 TABLET, DELAYED RELEASE ORAL 2 TIMES DAILY
Qty: 26 TABLET | Refills: 0 | Status: SHIPPED | OUTPATIENT
Start: 2023-08-29 | End: 2023-09-11

## 2023-08-29 RX ORDER — MYCOPHENOLIC ACID 360 MG/1
360 TABLET, DELAYED RELEASE ORAL 2 TIMES DAILY
Qty: 16 TABLET | Refills: 0 | Status: SHIPPED | OUTPATIENT
Start: 2023-08-29 | End: 2023-08-29

## 2023-08-29 NOTE — PROGRESS NOTES
Tacrolimus level 6.5 at 12 hours, on 8/25/23.  Goal 8-10.   Current dose 0.4 mls in AM, 0.4 mls in PM    Dose changed to 0.5 mls in AM, 0.5 mls in PM   Recheck level this Friday with weekly standing labs    Discussed with patient    [employer] message sent

## 2023-09-01 ENCOUNTER — LAB (OUTPATIENT)
Dept: LAB | Facility: CLINIC | Age: 58
End: 2023-09-01

## 2023-09-01 PROCEDURE — 80197 ASSAY OF TACROLIMUS: CPT | Performed by: INTERNAL MEDICINE

## 2023-09-03 ENCOUNTER — EXTERNAL ORDER RESULTS (OUTPATIENT)
Dept: LAB | Facility: CLINIC | Age: 58
End: 2023-09-03
Payer: COMMERCIAL

## 2023-09-03 DIAGNOSIS — Z79.899 ENCOUNTER FOR LONG-TERM (CURRENT) USE OF HIGH-RISK MEDICATION: ICD-10-CM

## 2023-09-03 DIAGNOSIS — I48.91 ATRIAL FIBRILLATION, UNSPECIFIED TYPE (H): ICD-10-CM

## 2023-09-03 DIAGNOSIS — I63.9 ISCHEMIC STROKE (H): ICD-10-CM

## 2023-09-03 LAB
INR (EXTERNAL) - DO NOT USE: 2.4 (ref 0.9–1.1)
PT - PROTHROMBINE TIME (EXTERNAL): 27 SECS (ref 12–14.5)

## 2023-09-05 ENCOUNTER — ANTICOAGULATION THERAPY VISIT (OUTPATIENT)
Dept: ANTICOAGULATION | Facility: CLINIC | Age: 58
End: 2023-09-05
Payer: COMMERCIAL

## 2023-09-05 DIAGNOSIS — Z79.899 ENCOUNTER FOR LONG-TERM (CURRENT) USE OF HIGH-RISK MEDICATION: ICD-10-CM

## 2023-09-05 DIAGNOSIS — I48.91 ATRIAL FIBRILLATION, UNSPECIFIED TYPE (H): Primary | ICD-10-CM

## 2023-09-05 LAB
TACROLIMUS BLD-MCNC: 8.8 UG/L (ref 5–15)
TME LAST DOSE: NORMAL H
TME LAST DOSE: NORMAL H

## 2023-09-05 NOTE — PROGRESS NOTES
ANTICOAGULATION MANAGEMENT     Edson Thornton Jr. 58 year old male is on warfarin with therapeutic INR result. (Goal INR 2.0-3.0)    Recent labs: (last 7 days)     09/01/23  0730   INR 2.4*       ASSESSMENT     Source(s): Chart Review and Patient/Caregiver Call     Warfarin doses taken: Warfarin taken as instructed  Diet: No new diet changes identified  Medication/supplement changes: None noted  New illness, injury, or hospitalization: No  Signs or symptoms of bleeding or clotting: No  Previous result: Therapeutic last 2(+) visits  Additional findings: None       PLAN     Recommended plan for no diet, medication or health factor changes affecting INR     Dosing Instructions: Continue your current warfarin dose with next INR in 1 week       Summary  As of 9/5/2023      Full warfarin instructions:  3 mg every Sun, Tue, Thu; 2 mg all other days   Next INR check:  9/8/2023               Telephone call with Bret who verbalizes understanding and agrees to plan    Patient using outside facility for labs    Education provided:   Contact [phone] with any changes, questions or concerns.     Plan made per [facility] anticoagulation protocol    Annabel Leiva RN  Anticoagulation Clinic  9/5/2023    _______________________________________________________________________     Anticoagulation Episode Summary       Current INR goal:  2.0-3.0   TTR:  83.2 % (11.6 mo)   Target end date:  Indefinite   Send INR reminders to:  [facility]    Indications    Atrial fibrillation  unspecified type (H) [I48.91]  Encounter for long-term (current) use of high-risk medication [Z79.899]             Comments:               Anticoagulation Care Providers       Provider Role Specialty Phone number    Abiodun Woods MD Referring Pulmonary Disease [phone]

## 2023-09-06 ENCOUNTER — TELEPHONE (OUTPATIENT)
Dept: TRANSPLANT | Facility: CLINIC | Age: 58
End: 2023-09-06
Payer: COMMERCIAL

## 2023-09-06 NOTE — TELEPHONE ENCOUNTER
Spoke with Bret about recent positive fungitell lab result. He reports not feeling well. Slight increase in cough and sputum. SOB with activity, today saw 86 O2 sat after climbing the stairs-no O2 use at home. Did return to baseline within 15 seconds of rest. Symptoms have gotten gradually worse but particularly bothersome today.     Per Dr. Woods, Bret should see a local pulmonologist or PCP today. Will get a follow up sputum culture at that time (order sent to [facility]). Provider can call transplant office with questions or concerns.    RN relayed this to Bret who was agreeable to going in today.     Lanie Escamilla, transplant RN

## 2023-09-06 NOTE — TELEPHONE ENCOUNTER
Spoke with Suni QUEZADA who is seeing patient. Patient was given neb treatment. Chest xray is fine, WBC count is up. Gave contact info to Dr. Woods to contact Suni and he suggested 2 weeks of Levofloxacin.

## 2023-09-07 ENCOUNTER — TELEPHONE (OUTPATIENT)
Dept: ANTICOAGULATION | Facility: CLINIC | Age: 58
End: 2023-09-07
Payer: COMMERCIAL

## 2023-09-07 ENCOUNTER — TELEPHONE (OUTPATIENT)
Dept: TRANSPLANT | Facility: CLINIC | Age: 58
End: 2023-09-07
Payer: COMMERCIAL

## 2023-09-07 DIAGNOSIS — I48.91 ATRIAL FIBRILLATION, UNSPECIFIED TYPE (H): Primary | ICD-10-CM

## 2023-09-07 DIAGNOSIS — Z79.899 ENCOUNTER FOR LONG-TERM (CURRENT) USE OF HIGH-RISK MEDICATION: ICD-10-CM

## 2023-09-07 NOTE — TELEPHONE ENCOUNTER
ANTICOAGULATION  MANAGEMENT     Interacting Medication Review    Interacting medication(s): Levofloxacin (Levaquin) with warfarin.    Duration: 14 days  (9/7/23 to 9/21/23)    Indication: Pneumonia    New medication?:  Patient was on Levaquin in July 2023         PLAN     Continue your current warfarin dose with next INR in 1 day        Summary  As of 9/7/2023      Full warfarin instructions:  3 mg every Sun, Tue, Thu; 2 mg all other days   Next INR check:  9/8/2023               Telephone call with Bret who verbalizes understanding and agrees to plan and who agrees to plan and repeated back plan correctly    New recheck date updated on anticoagulation calendar     Plan made per [facility] anticoagulation protocol    Nick Hunter, RN  Anticoagulation Clinic

## 2023-09-07 NOTE — TELEPHONE ENCOUNTER
Patient Call: General  Route to LPN    Reason for call: Vimal Nurse from [facility] calling to speak with Coordinator. Patient was started on a Antibiotic Levaquin 700 mg, and patient is also taking Coumadin wanting to know should they check the patient's INR       Call back needed? Yes    Return Call Needed  Same as documented in contacts section  When to return call?: Same day: Route High Priority

## 2023-09-08 ENCOUNTER — LAB (OUTPATIENT)
Dept: LAB | Facility: CLINIC | Age: 58
End: 2023-09-08

## 2023-09-08 ENCOUNTER — ANTICOAGULATION THERAPY VISIT (OUTPATIENT)
Dept: ANTICOAGULATION | Facility: CLINIC | Age: 58
End: 2023-09-08
Payer: COMMERCIAL

## 2023-09-08 ENCOUNTER — TELEPHONE (OUTPATIENT)
Dept: TRANSPLANT | Facility: CLINIC | Age: 58
End: 2023-09-08
Payer: COMMERCIAL

## 2023-09-08 DIAGNOSIS — I48.91 ATRIAL FIBRILLATION, UNSPECIFIED TYPE (H): Primary | ICD-10-CM

## 2023-09-08 DIAGNOSIS — Z79.899 ENCOUNTER FOR LONG-TERM (CURRENT) USE OF HIGH-RISK MEDICATION: ICD-10-CM

## 2023-09-08 LAB — INR (EXTERNAL): 2.5 (ref 0.9–1.1)

## 2023-09-08 PROCEDURE — 80197 ASSAY OF TACROLIMUS: CPT | Performed by: INTERNAL MEDICINE

## 2023-09-08 NOTE — PROGRESS NOTES
ANTICOAGULATION MANAGEMENT     Edson Thornton Jr. 58 year old male is on warfarin with therapeutic INR result. (Goal INR 2.0-3.0)    Recent labs: (last 7 days)     09/08/23  0000   INR 2.5*       ASSESSMENT     Source(s): Chart Review     Warfarin doses taken: Reviewed in chart  Diet: No new diet changes identified  Medication/supplement changes:  Levaquin 9/7-/9/21  New illness, injury, or hospitalization: Yes: pneumonia  Signs or symptoms of bleeding or clotting: No  Previous result: Therapeutic last 2(+) visits  Additional findings: None       PLAN     Recommended plan for temporary change(s) affecting INR     Dosing Instructions: Continue your current warfarin dose with next INR in 1 week       Summary  As of 9/8/2023      Full warfarin instructions:  3 mg every Sun, Tue, Thu; 2 mg all other days   Next INR check:  9/15/2023               Detailed voice message left for Bret with dosing instructions and follow up date.   Sent [employer] message with dosing and follow up instructions    Patient using outside facility for labs    Education provided:   None required    Plan made per [facility] anticoagulation protocol    Suni Choi RN  Anticoagulation Clinic  9/8/2023    _______________________________________________________________________     Anticoagulation Episode Summary       Current INR goal:  2.0-3.0   TTR:  83.4 % (11.7 mo)   Target end date:  Indefinite   Send INR reminders to:  [facility]    Indications    Atrial fibrillation  unspecified type (H) [I48.91]  Encounter for long-term (current) use of high-risk medication [Z79.899]             Comments:               Anticoagulation Care Providers       Provider Role Specialty Phone number    Abiodun Woods MD Referring Pulmonary Disease [phone]

## 2023-09-11 LAB
TACROLIMUS BLD-MCNC: 12.4 UG/L (ref 5–15)
TME LAST DOSE: NORMAL H
TME LAST DOSE: NORMAL H

## 2023-09-12 DIAGNOSIS — D84.9 IMMUNOSUPPRESSED STATUS (H): ICD-10-CM

## 2023-09-12 DIAGNOSIS — Z94.2 LUNG REPLACED BY TRANSPLANT (H): ICD-10-CM

## 2023-09-12 NOTE — TELEPHONE ENCOUNTER
FUTURE VISIT INFORMATION      FUTURE VISIT INFORMATION:  Date: 11/16/23  Time: 9:40AM  Location: [facility]  REFERRAL INFORMATION:  Referring provider:  Leticia Gordon  Referring providers clinic:  TRANSPLANT CLINIC   Reason for visit/diagnosis  H93.8X3 (ICD-10-CM) - Sensation of plugged ear on both sides, Referral from Leticia Grodon, Referral notes in [facility] location     RECORDS REQUESTED FROM:       Clinic name Comments Records Status Imaging Status    TRANSPLANT CLINIC  8/1/23 Leticia Gordon [facility]     [facility] 3/1/22- OV Carmen Martel, PILAR  [facility]  8/30/23- OV Luis Sparks PA   6/12/23- OV Hyacinth Maloney PA CE     IMAGING  2/1/22- CT HEAD   1/31/22- CT HEAD  [facility]  PACS

## 2023-09-15 ENCOUNTER — LAB (OUTPATIENT)
Dept: LAB | Facility: CLINIC | Age: 58
End: 2023-09-15

## 2023-09-15 ENCOUNTER — ANTICOAGULATION THERAPY VISIT (OUTPATIENT)
Dept: ANTICOAGULATION | Facility: CLINIC | Age: 58
End: 2023-09-15
Payer: COMMERCIAL

## 2023-09-15 DIAGNOSIS — I48.91 ATRIAL FIBRILLATION, UNSPECIFIED TYPE (H): Primary | ICD-10-CM

## 2023-09-15 DIAGNOSIS — Z79.899 ENCOUNTER FOR LONG-TERM (CURRENT) USE OF HIGH-RISK MEDICATION: ICD-10-CM

## 2023-09-15 LAB — INR (EXTERNAL): 1.9 (ref 0.9–1.1)

## 2023-09-15 PROCEDURE — 80197 ASSAY OF TACROLIMUS: CPT | Performed by: INTERNAL MEDICINE

## 2023-09-15 NOTE — PROGRESS NOTES
ANTICOAGULATION MANAGEMENT     Edson Thornton Jr. 58 year old male is on warfarin with subtherapeutic INR result. (Goal INR 2.0-3.0)    Recent labs: (last 7 days)     09/15/23  1024   INR 1.9*       ASSESSMENT     Source(s): Chart Review and Patient/Caregiver Call     Warfarin doses taken: Warfarin taken as instructed  Diet: No new diet changes identified  Medication/supplement changes: None noted  New illness, injury, or hospitalization: No  Signs or symptoms of bleeding or clotting: No  Previous result: Therapeutic last 2(+) visits  Additional findings: None       PLAN     Recommended plan for ongoing change(s) affecting INR     Dosing Instructions: booster dose then Increase your warfarin dose (5% change) with next INR in 1 week       Summary  As of 9/15/2023      Full warfarin instructions:  9/15: 3 mg; Otherwise 2 mg every Mon, Wed, Fri; 3 mg all other days   Next INR check:  9/22/2023               Telephone call with Bret who verbalizes understanding and agrees to plan and who agrees to plan and repeated back plan correctly  Sent My chart message    Patient using outside facility for labs    Education provided:   Symptom monitoring: monitoring for bleeding signs and symptoms, monitoring for clotting signs and symptoms, monitoring for stroke signs and symptoms, and when to seek medical attention/emergency care  Importance of notifying anticoagulation clinic for: changes in medications; a sooner lab recheck maybe needed and diarrhea, nausea/vomiting, reduced intake, cold/flu, and/or infections; a sooner lab recheck maybe needed    Plan made per [facility] anticoagulation protocol    Nick Hunter, RN  Anticoagulation Clinic  9/15/2023    _______________________________________________________________________     Anticoagulation Episode Summary       Current INR goal:  2.0-3.0   TTR:  85.0 % (11.7 mo)   Target end date:  Indefinite   Send INR reminders to:  [facility]    Indications    Atrial  fibrillation  unspecified type (H) [I48.91]  Encounter for long-term (current) use of high-risk medication [Z79.899]             Comments:               Anticoagulation Care Providers       Provider Role Specialty Phone number    Abiodun Woods MD Referring Pulmonary Disease [phone]

## 2023-09-20 DIAGNOSIS — G25.1 TREMOR DUE TO MULTIPLE DRUGS: ICD-10-CM

## 2023-09-20 DIAGNOSIS — I10 PRIMARY HYPERTENSION: ICD-10-CM

## 2023-09-20 LAB
TACROLIMUS BLD-MCNC: 8.6 UG/L (ref 5–15)
TME LAST DOSE: NORMAL H
TME LAST DOSE: NORMAL H

## 2023-09-20 RX ORDER — PROPRANOLOL HYDROCHLORIDE 20 MG/1
40 TABLET ORAL 2 TIMES DAILY
Qty: 120 TABLET | Refills: 11 | Status: SHIPPED | OUTPATIENT
Start: 2023-09-20 | End: 2024-08-28

## 2023-09-22 ENCOUNTER — ANTICOAGULATION THERAPY VISIT (OUTPATIENT)
Dept: ANTICOAGULATION | Facility: CLINIC | Age: 58
End: 2023-09-22
Payer: COMMERCIAL

## 2023-09-22 ENCOUNTER — LAB (OUTPATIENT)
Dept: LAB | Facility: CLINIC | Age: 58
End: 2023-09-22

## 2023-09-22 DIAGNOSIS — Z79.899 ENCOUNTER FOR LONG-TERM (CURRENT) USE OF HIGH-RISK MEDICATION: ICD-10-CM

## 2023-09-22 DIAGNOSIS — I48.91 ATRIAL FIBRILLATION, UNSPECIFIED TYPE (H): Primary | ICD-10-CM

## 2023-09-22 LAB — INR (EXTERNAL): 2 (ref 0.9–1.1)

## 2023-09-22 PROCEDURE — 80197 ASSAY OF TACROLIMUS: CPT | Performed by: INTERNAL MEDICINE

## 2023-09-22 NOTE — PROGRESS NOTES
Last encounter: booster dose then Increase your warfarin dose (5% change) with next INR in 1 week       ANTICOAGULATION MANAGEMENT     Edson Thornton Jr. 58 year old male is on warfarin with therapeutic INR result. (Goal INR 2.0-3.0)    Recent labs: (last 7 days)     09/22/23  0746   INR 2.0*       ASSESSMENT     Source(s): Chart Review and Patient/Caregiver Call     Warfarin doses taken:  Last encounter: booster dose then Increase your warfarin dose (5% change) with next INR in 1 week    Diet: No new diet changes identified  Medication/supplement changes: None noted  New illness, injury, or hospitalization: No  Signs or symptoms of bleeding or clotting: No  Previous result: Subtherapeutic  Additional findings: None       PLAN     Recommended plan for temporary change(s) and ongoing change(s) affecting INR     Dosing Instructions: Continue your current warfarin dose with next INR in 1 week       Summary  As of 9/22/2023      Full warfarin instructions:  2 mg every Mon, Wed, Fri; 3 mg all other days   Next INR check:  9/29/2023               Telephone call with Bret who agrees to plan and repeated back plan correctly    Patient using outside facility for labs    Education provided:   Goal range and lab monitoring: goal range and significance of current result and Importance of following up at instructed interval  Contact [phone] with any changes, questions or concerns.     Plan made per [facility] anticoagulation protocol    ESSIE COLÓN RN  Anticoagulation Clinic  9/22/2023    _______________________________________________________________________     Anticoagulation Episode Summary       Current INR goal:  2.0-3.0   TTR:  84.2 % (11.7 mo)   Target end date:  Indefinite   Send INR reminders to:  [facility]    Indications    Atrial fibrillation  unspecified type (H) [I48.91]  Encounter for long-term (current) use of high-risk medication [Z79.899]             Comments:               Anticoagulation Care  Providers       Provider Role Specialty Phone number    Abiodun Woods MD Referring Pulmonary Disease [phone]

## 2023-09-25 LAB
TACROLIMUS BLD-MCNC: 7.8 UG/L (ref 5–15)
TME LAST DOSE: NORMAL H
TME LAST DOSE: NORMAL H

## 2023-09-27 ENCOUNTER — DOCUMENTATION ONLY (OUTPATIENT)
Dept: TRANSPLANT | Facility: CLINIC | Age: 58
End: 2023-09-27
Payer: COMMERCIAL

## 2023-09-27 ENCOUNTER — TELEPHONE (OUTPATIENT)
Dept: TRANSPLANT | Facility: CLINIC | Age: 58
End: 2023-09-27
Payer: COMMERCIAL

## 2023-09-27 DIAGNOSIS — Z94.2 LUNG REPLACED BY TRANSPLANT (H): ICD-10-CM

## 2023-09-27 DIAGNOSIS — Z79.899 ENCOUNTER FOR LONG-TERM (CURRENT) USE OF HIGH-RISK MEDICATION: ICD-10-CM

## 2023-09-27 DIAGNOSIS — I10 PRIMARY HYPERTENSION: Primary | ICD-10-CM

## 2023-09-27 DIAGNOSIS — D84.9 IMMUNOSUPPRESSED STATUS (H): ICD-10-CM

## 2023-09-27 NOTE — LETTER
"    11/14/2022         RE: Edson Thornton  [address] [place] [address]        Dear Colleague,    Thank you for referring your patient, Edson Thornton, to the [facility]. Please see a copy of my visit note below.    Transplant Coordinator Note    Reason for visit: Post lung transplant follow up visit   Coordinator: Present   Caregiver:  SO    Health concerns addressed today:  1. Respiratory - feeling well. Not getting winded with activities. Able to walk 0.25miles before getting winded.   2. Sternal/Chest pain - sometimes with pressure, sometimes hurts when breathing, hurts to breathe out. Fall April/May, braced fall with hand, \"knocked the wind out of me\", sternal pain since then.   3. GI: good appetite, occasional nausea - resolves on own. Occasional diarrhea - resolves on own.   4. Night sweats - at baseline.   5. ENT: runny nose, clear, at baseline.   6. 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 Home -120/60-70s.     Activity/rehab: up ad [place], walking 4 blocks most days of the week  Oxygen needs: room air  Pain management/RX: joint/bone pain (wrist and fingers), sees rheumatology next week. Some swelling. Using Volteran Gel.   Diabetic management: on lantus, occasional novolog  Next Bronch due: 9/13/20222  CMV status: D-/R-  EBV status: D+/R+  AC/asa: on coumadin, bridged to Lovenox for bronch (lower dose per CrCl d/t bleeding post bronch x 2)  PJP prophylactic: Bactrim     COVID:  1. COVID-19 infection (yes/no, date of most recent positive test): no  2. Status/instructions given about COVID-19 vaccine: Has received full dose of Evusheld 1/7/2022 and 4/18/22. Received Evusheld 10/18/2022 locally. Due 2/2023 for the new COVID vaccine booster     Labs, CXR, PFTs reviewed with patient  Medication record reviewed and reconciled  Questions and concerns addressed    Vaccines - patient will get flu vaccine today (11/14/2022).   Future vaccines, plan for Prevenar 20 " no lesions, no deformities, no traumatic injuries, no significant scars are present, chest wall non-tender, no masses present, breathing is unlabored without accessory muscle use,normal breath sounds in 1 month (December 2022). Patient should get Hepatitis B vaccine series after getting Prevenar.     Patient Instructions  1. Continue to hydrate with 60-70 oz fluids daily.  2. Continue to exercise daily or most days of the week.  3. Follow up with your primary care provider for annual gender health maintenance procedures.  4. Follow up with colonoscopy schedule.  5. Follow up with annual dermatology visits.  6. It doesn't seem like the COVID vaccine is working well in lung transplant patients. A number of lung transplant patients have gotten sick with COVID even after receiving the vaccines.  Based on our recent experience, it can be life-threatening to get COVID  even after being vaccinated. Please continue to act like you did not get the COVID vaccine - social distancing, wearing a mask, good hand hygiene, etc. If the people around you are vaccinated, it will help reduce the risk of you getting COVID. All members of your household should be vaccinated.  7. Decrease your Calcium Carbonate to once a day.   8. Try using some Volteran gel on your sternal incision.   9. We will have you seen an endocrinologist for your bones next time you're in town.   10. We are going to have you start Advair 1 puff twice a day. You should make sure to swish our your mouth afterwards. You can watch videos on YouTube on how to use the inhaler.   11. You should get the COVID booster in February, 2023.     Next transplant clinic appointment: 1 month with CXR, labs and PFTs  Next lab draw: pending tacrolimus level, otherwise in 2 weeks.       AVS printed at time of check out          Reason for Visit  Edson Thornton is a 57 year old year old male who is being seen for No chief complaint on file.      Assessment and plan:   Edson Thornton is a 57 year old male with NSIP/ILD, bronchiectasis, moderate PH, RA, SALTY, chronic HSV infection, hypogammaglobulinemia, steroid-induced diabetes, hypothyroidism, PFO, HTN, HLD, duodenal  anomaly, anxiety, and depression. Admitted on 9/5/21 from OSH for acute on chronic respiratory failure 2/2 ILD exacerbation now s/p BSLT on 10/16/21. Prolonged vent wean s/p trach and PEG/J tube.  Post-op course also complicated by encephalopathy and diffuse weakness, acute to subacute CVA, afib with RVR, BRENNAN, GI bleed, Candidemia/Candida empyema, and anxiety. Code called 11/2 for bradycardia/asystole, progressive hypotension, found to have significant GI bleed, EGD with adherent clot near PEG tube site that was clipped.  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    Pulmonary/lung transplant: Dyspnea with mild to moderate exertion, unchanged in the recent past.  Minimal cough or sputum.  Chest x-ray with bilateral lower lung atelectasis, unchanged from previous.  On 6-minute walk, the patient had mild desaturation but does not require supplemental oxygen.  PFTs are significantly decreased from previous but interestingly had an excellent bronchodilator response.  The etiology and significance of this finding is unclear but based on this change, I have recommended a trial of Advair 1 puff twice daily.  It is possible this represents early SHAYLA but given the bronchodilator response trial of bronchodilators will be initiated first.  PFTs have always been below expected.  A previous sniff test revealed normal diaphragm function.  The patient only had 2 surveillance bronchoscopies but neither showed acute rejection.  Due to bleeding complications, no further biopsies been pursued.:  This will be adjusted to maintain a level of 8-10, reduced from previous as the patient has reached the 1 year cr.  Continue current Myfortic and prednisone.    Positive crossmatch: The patient had a retrospective positive crossmatch following transplantation, attributed to rituximab received in June.   Subsequent DSA is positive, waxing and waning but consistently below the MFI threshold for treatment.   Date DSA mfi Biopsy (date) Treatment   10/17/2021  none         10/23/2021  none         11/1/2021  none         11/15/2021  none         11/29/2021  DQ B5  2522       12/6/2021  DQ B5  1873       12/12/2021  DQ B5  1703       12/27/2021  DQ B5  3924       1/3/2022  DQ B5  3072       1/10/2022  DQ B5  4032       1/17/2022  DQB5  1849       2/3/2022 DQB5   2488       2/7/2022 DQB5  1874       2/10/2022 DQB5  1781       3/15/2022 DQB5  2254       3/21/2022 DQB5  2117       4/18/2022 DQB5   908       6/20/2022  DQB5  1100       6/29/2022  DQB5  1411       9/12/2022 DQB5  1681     11/14/2022 DQB5  891                        Prophylaxis: The patient is CMV -/-.  Continue CMV monitoring.  Continue Bactrim for PJP and nocardia prophylaxis.    Ophthalmology: Double vision/blind spots, defer to ophthalmology follow-up.    Rheumatoid arthritis: Hand joint pain and stiffness moderately well controlled with current immunosuppression.    Atrial fibrillation with RVR: The patient appears to be in sinus rhythm by exam today.  Continue propranolol.    Disseminated Candida: The patient has completed a course of fluconazole.    Recurrent HSV: Resolved.  Will require chronic suppressive therapy if recurrence.    Hypogammaglobulinemia: Continue monitoring.  Replacement only if low and recurrent infections.    Obstructive sleep apnea: Continue CPAP.  May need follow-up sleep study.    Hypomagnesemia: Magnesium level is low but the patient is already on a large amount of replacement.  Continue monitoring and consider IV replacement if progressive decline.    Hypertension: Blood pressure is mildly elevated in clinic but patient reports it is well controlled at home.  Typically 117-128/72-84 continue amlodipine, propranolol and tamsulosin.    Depression/anxiety: Adequately controlled with current duloxetine.    History of GI bleed: No  evidence of recurrence.  Continue pantoprazole.    Steroid-induced hyperglycemia: Blood sugar adequately controlled with current insulin regimen.    Hypothyroidism: Continue current levothyroxine.    Multiple acute/subacute ischemic strokes: etiology likely embolic vs perioperative. MRI brain 10/23 with infarcts noted in both cerebral hemispheres, left cerebellum, presumed associated with new Afib vs perioperative. Bilateral upper extremity paresis (R>L), suspicion for some cortical blindness. SBP goal < 140. Continue warfarin, HTN and BS control and rosuvastatin.    Hypercalcemia: PTH was checked after the visit and found to be elevated.  This is probably consistent with primary hyperparathyroidism.  Calcium will be reduced to once daily.  We will pursue endocrine consult.    CKD: The patient has stage IIIa CKD.,  School was reduced as noted above.  The patient should otherwise maintain adequate hydration and avoid other nephrotoxins.    Reduced bone density: DEXA scan with reduced bone density.  There are no previous scans for comparison.  Since the patient had his transplant evaluation urgently during hospital admission, it did not include a bone density scan.  Endocrine evaluation will be arranged with his follow-up visit.    Chest wall pain: Trial of Voltaren gel.    Healthcare maintenance: Influenza vaccine today.  The patient's transplant evaluation was performed urgently so he did not receive Prevnar or the hepatitis series.  Prevnar 20 will be administered next visit as well as the first of the hepatitis series    I, Abiodun Woods, have spent 43 minutes on the day of the visit to review the chart, interview and examine the patient, review labs and imaging, formulate a plan and submit orders. Time documented is excluding time spent for PFT interpretation.       Lung TX HPI  Transplants:  10/16/2021 (Lung), Postoperative day:  394    The patient was seen and examined by MD Edson Castle  "Norberto is a 57 year old male with NSIP/ILD, bronchiectasis, moderate PH, RA, SALTY, chronic HSV infection, hypogammaglobulinemia, steroid-induced diabetes, hypothyroidism, PFO, HTN, HLD, duodenal anomaly, anxiety, and depression. Admitted on 9/5/21 from OSH for acute on chronic respiratory failure 2/2 ILD exacerbation now s/p BSLT on 10/16/21. Prolonged vent wean s/p trach and PEG/J tube.  Post-op course also complicated by encephalopathy and diffuse weakness, acute to subacute CVA, afib with RVR, BRENNAN, GI bleed, Candidemia/Candida empyema, and anxiety. Code called 11/2 for bradycardia/asystole, progressive hypotension, found to have significant GI bleed, EGD with adherent clot near PEG tube site that was clipped.  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    No illnesses since the last visit.  Dyspnea with approximately 1/4 mile on flat ground.  The patient goes walking regularly.  Occasional cough which is unchanged.  Occasional clear-yellow sputum.  No hemoptysis.  Ongoing pain at the sternum and left chest with some chest wall tenderness and pain with exhalation.  Of note the patient [place] the [place] on his hands, \"knocked the wind out of himself\" in his chest has been sore since that time.  No fever or chills.  Chronic night sweats which are unchanged.    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    Review of systems:  Appetite is okay  Clear rhinorrhea  Occasional nausea, self-limited.  No vomiting.  No abdominal pain.  Occasional loose stool.  Ongoing stiffness/pain in the fingers attributed to rheumatoid arthritis  Easy bruising  A complete ROS was otherwise negative except as noted in the HPI.    Current Outpatient Medications   Medication     acetaminophen (TYLENOL) 325 MG tablet     amLODIPine (NORVASC) 10 MG tablet "     calcium carbonate 600 mg-vitamin D 400 units (CALTRATE) 600-400 MG-UNIT per tablet     diclofenac (VOLTAREN) 1 % topical gel     DULoxetine (CYMBALTA) 30 MG capsule     fluticasone (FLONASE) 50 MCG/ACT nasal spray     insulin aspart (NOVOLOG PEN) 100 UNIT/ML pen     insulin glargine (LANTUS PEN) 100 UNIT/ML pen     insulin pen needle (32G X 4 MM) 32G X 4 MM miscellaneous     levalbuterol (XOPENEX HFA) 45 MCG/ACT inhaler     levothyroxine (SYNTHROID/LEVOTHROID) 25 MCG tablet     Magnesium Glycinate POWD     Melatonin 10 MG TABS tablet     Multiple Vitamin (MULTIVITAMIN ADULT PO)     multivitamin w/minerals (THERA-VIT-M) tablet     MYFORTIC (BRAND) 360 MG EC tablet     ondansetron (ZOFRAN-ODT) 4 MG ODT tab     pantoprazole (PROTONIX) 40 MG EC tablet     predniSONE (DELTASONE) 2.5 MG tablet     predniSONE (DELTASONE) 5 MG tablet     propranolol (INDERAL) 20 MG tablet     rOPINIRole (REQUIP) 0.25 MG tablet     rosuvastatin (CRESTOR) 10 MG tablet     senna-docusate (SENOKOT-S/PERICOLACE) 8.6-50 MG tablet     sulfamethoxazole-trimethoprim (BACTRIM) 400-80 MG tablet     tacrolimus (GENERIC EQUIVALENT) 1 MG capsule     tamsulosin (FLOMAX) 0.4 MG capsule     warfarin ANTICOAGULANT (COUMADIN) 1 MG tablet     warfarin ANTICOAGULANT (COUMADIN) 2 MG tablet     zolpidem (AMBIEN) 10 MG tablet     No current facility-administered medications for this visit.     No Known Allergies  Past Medical History:   Diagnosis Date     BRENNAN (acute kidney injury) (H) 10/17/2021     Anxiety      Depression      HLD (hyperlipidemia)      HTN (hypertension)      Hypothyroidism      ILD (interstitial lung disease) (H)      SALTY on CPAP      Oxygen dependent     BL 4L since ~6/2021     Primary hypertension 2/28/2022     Rheumatoid arthritis (H)     signs ~5/2020, dx 5/2021     S/P lung transplant (H) 10/16/2021     Shock liver 10/17/2021     Steroid-induced hyperglycemia      Traction bronchiectasis (H)        Past Surgical History:   Procedure  Laterality Date     BRONCHOSCOPY (RIGID OR FLEXIBLE), DIAGNOSTIC N/A 1/26/2022    Procedure: BRONCHOSCOPY, WITH BRONCHOALVEOLAR LAVAGE AND BIOPSY;  Surgeon: Perlman, David Morris, MD;  Location:  GI     BRONCHOSCOPY (RIGID OR FLEXIBLE), DIAGNOSTIC N/A 4/19/2022    Procedure: BRONCHOSCOPY, WITH BRONCHOALVEOLAR LAVAGE AND BIOPSY;  Surgeon: Sherine Merrill MD;  Location:  GI     BRONCHOSCOPY (RIGID OR FLEXIBLE), DIAGNOSTIC N/A 6/21/2022    Procedure: BRONCHOSCOPY, WITH BRONCHOALVEOLAR LAVAGE AND BIOPSY;  Surgeon: Aneesh Rubio MD;  Location:  GI     COLONOSCOPY W/ BIOPSIES AND POLYPECTOMY  07/21/2020     CV CORONARY ANGIOGRAM N/A 09/08/2021    Procedure: Coronary Angiogram with possible intervention;  Surgeon: Jovon Bullock MD;  Location:  HEART CARDIAC CATH LAB     CV RIGHT HEART CATH MEASUREMENTS RECORDED N/A 09/08/2021    Procedure: Right Heart Cath;  Surgeon: Jovon Bullock MD;  Location:  HEART CARDIAC CATH LAB     ESOPHAGOSCOPY, GASTROSCOPY, DUODENOSCOPY (EGD), COMBINED N/A 10/23/2021    Procedure: ESOPHAGOGASTRODUODENOSCOPY (EGD);  Surgeon: Miquel Pisano MD;  Location:  GI     ESOPHAGOSCOPY, GASTROSCOPY, DUODENOSCOPY (EGD), COMBINED N/A 11/02/2021    Procedure: ESOPHAGOGASTRODUODENOSCOPY (EGD);  Surgeon: Daniel Ortiz MD;  Location:  GI     ESOPHAGOSCOPY, GASTROSCOPY, DUODENOSCOPY (EGD), COMBINED N/A 11/5/2021    Procedure: ESOPHAGOGASTRODUODENOSCOPY (EGD);  Surgeon: Ronnell Hernandez MD;  Location:  GI     EXTRACTION(S) DENTAL N/A 09/22/2021    Procedure: EXTRACTION tooth #19;  Surgeon: Deepak Tobin DDS;  Location:  OR     HERNIA REPAIR       IR CHEST TUBE PLACEMENT NON-TUNNELLED LEFT  11/03/2021     PICC TRIPLE LUMEN PLACEMENT Left 11/04/2021    5FR TL PICC. Right non occlusive thrombus subclavian vein.     REPLACE GASTROJEJUNOSTOMY TUBE, PERCUTANEOUS N/A 11/9/2021    Procedure: REPLACEMENT, GASTROJEJUNOSTOMY TUBE, PERCUTANEOUS;  Surgeon:  Hernando Rodriguez MD;  Location: [facility] GI     right acl       TRACHEOSTOMY PERCUTANEOUS N/A 10/29/2021    Procedure: Percutaneous Tracheostomy,;  Surgeon: Celine Jenkins MD;  Location: [facility] OR     TRANSPLANT LUNG RECIPIENT SINGLE X2 Bilateral 10/16/2021    Procedure: TRANSPLANT, LUNG, RECIPIENT, BILATERAL, Bronchoscopy, on-pump perfusion, bilateral clamshell sternotomy;  Surgeon: Yanick Corral MD;  Location: [facility] OR     XR ACROMIOCLAVICULAR JOINT BILATERAL         Social History     Socioeconomic History     Marital status: Single     Spouse name: Not on file     Number of children: Not on file     Years of education: Not on file     Highest education level: Not on file   Occupational History     Not on file   Tobacco Use     Smoking status: Never     Smokeless tobacco: Never   Substance and Sexual Activity     Alcohol use: Never     Drug use: Never     Sexual activity: Not on file   Other Topics Concern     Parent/sibling w/ CABG, MI or angioplasty before 65F 55M? Not Asked   Social History Narrative     Not on file     Social Determinants of Health     Financial Resource Strain: Not on file   Food Insecurity: Not on file   Transportation Needs: Not on file   Physical Activity: Not on file   Stress: Not on file   Social Connections: Not on file   Intimate Partner Violence: Not on file   Housing Stability: Not on file         BP (!) 146/81   Pulse 64   SpO2 95%   There is no height or weight on file to calculate BMI.  Exam:   GENERAL APPEARANCE: Well developed, well nourished, alert, and in no apparent distress.  EYES: PERRL, EOMI  HENT: Nasal mucosa with no edema and no hyperemia. No nasal polyps.  EARS: Canals clear, TMs normal  MOUTH: Oral mucosa is moist, without any lesions, no tonsillar enlargement, no oropharyngeal exudate.  NECK: supple, no masses, no thyromegaly.  LYMPHATICS: No significant axillary, cervical, or supraclavicular nodes.  RESP: normal percussion, diminished airflow at the bases,  right greater than left.  No crackles. No rhonchi. No wheezes.  CV: Normal S1, S2, regular rhythm, normal rate. No murmur.  No rub. No gallop. No LE edema.   ABDOMEN:  Bowel sounds normal, soft, nontender, no HSM or masses.   MS: extremities normal.  (+) Clubbing. No cyanosis.  SKIN: no rash on limited exam  NEURO: Mentation intact, speech normal, normal strength and tone, normal gait and stance  PSYCH: mentation appears normal. and affect normal/bright  Results:   Latest Reference Range & Units 11/14/22 07:26   Sodium 136 - 145 mmol/L 145   Potassium 3.4 - 5.3 mmol/L 4.4   Chloride 98 - 107 mmol/L 106   Carbon Dioxide (CO2) 22 - 29 mmol/L 29   Urea Nitrogen 6.0 - 20.0 mg/dL 22.8 (H)   Creatinine 0.67 - 1.17 mg/dL 1.65 (H)   GFR Estimate >60 mL/min/1.73m2 48 (L)   Calcium 8.6 - 10.0 mg/dL 10.1 (H)   Anion Gap 7 - 15 mmol/L 10   Magnesium 1.7 - 2.3 mg/dL 1.6 (L)   Phosphorus 2.5 - 4.5 mg/dL 3.7   Albumin 3.5 - 5.2 g/dL 4.6   Protein Total 6.4 - 8.3 g/dL 6.8   Alkaline Phosphatase 40 - 129 U/L 101   ALT 10 - 50 U/L 26   AST 10 - 50 U/L 23   Bilirubin Total <=1.2 mg/dL 0.3   Cholesterol <200 mg/dL 157   Glucose 70 - 99 mg/dL 108 (H)   HDL Cholesterol >=40 mg/dL 46   Hemoglobin A1C <5.7 % 6.1 (H)   LDL Cholesterol Calculated <=100 mg/dL 69   Non HDL Cholesterol <130 mg/dL 111   Testosterone Total 240 - 950 ng/dL 232 (L)   Triglycerides <150 mg/dL 211 (H)   Vitamin D Deficiency screening 20 - 75 ug/L 45   (H): Data is abnormally high  (L): Data is abnormally low     Latest Reference Range & Units 11/14/22 07:26   Parathyroid Hormone Intact 15 - 65 pg/mL 72 (H)   (H): Data is abnormally high       Latest Reference Range & Units 11/14/22 08:41   FVC-Pred L 3.90 (P)   FVC-Pre L 2.12 (P)   FVC-%Pred-Pre % 54 (P)   FEV1-Pre L 1.22 (P)   FEV1-%Pred-Pre % 39 (P)   FEV1FVC-Pred % 79 (P)   FEV1FVC-Pre % 57 (P)   FEV1SVC-Pred % 75 (P)   FEV1SVC-Pre % 50 (P)   FEV1FEV6-Pred % 79 (P)   FEV1FEV6-Pre % 60 (P)   FEFMax-Pred L/sec 8.24  (P)   FEFMax-Pre L/sec 3.93 (P)   FEFMax-%Pred-Pre % 47 (P)   FEF2575-Pred L/sec 2.76 (P)   FEF2575-Pre L/sec 0.56 (P)   MKT3217-%Pred-Pre % 20 (P)   FIFMax-Pre L/sec 3.91 (P)   ExpTime-Pre sec 11.16 (P)   FRCPleth-Pred L 3.26 (P)   FRCPleth-Pre L 2.54 (P)   FRCPleth-%Pred-Pre % 77 (P)   RVPleth-Pred L 2.17 (P)   RVPleth-Pre L 2.21 (P)   RVPleth-%Pred-Pre % 102 (P)   TLCPleth-Pred L 6.01 (P)   TLCPleth-Pre L 4.66 (P)   TLCPleth-%Pred-Pre % 77 (P)   ERV-Pred L 0.84 (P)   ERV-Pre L 0.32 (P)   ERV-%Pred-Pre % 38 (P)   IC-Pred L 3.26 (P)   IC-Pre L 2.13 (P)   IC-%Pred-Pre % 65 (P)   VC-Pred L 4.10 (P)   VC-Pre L 2.45 (P)   VC-%Pred-Pre % 59 (P)   DLCOunc-Pred ml/min/mmHg 23.61 (P)   DLCOunc-Pre ml/min/mmHg 12.63 (P)   DLCOunc-%Pred-Pre % 53 (P)   DLCOcor-Pre ml/min/mmHg 14.21 (P)   DLCOcor-%Pred-Pre % 60 (P)   VA-Pre L 3.42 (P)   VA-%Pred-Pre % 63 (P)   (P): Preliminary                  Results as noted above.    PFT Interpretation:  Severe obstructive ventilatory defect.  Significant improvement with bronchodilators.  Decreased from previous.  Below recent best.   Valid Maneuver    Normal lung volumes.  Mildly reduced diffusing capacity.      On 6-minute walk testing, the patient walked 1215 feet (1350 feet lower limit of normal).  Oxygen saturation fell from 97% to 93% but then stabilized at 94%.  No previous studies for comparison.                Again, thank you for allowing me to participate in the care of your patient.        Sincerely,        Abiodun Woods MD

## 2023-09-27 NOTE — CONFIDENTIAL NOTE
I received a call from the patient's primary care provider, Suni ([phone]).  The patient was feeling poorly at the beginning of the month with cough, shortness of breath, decreased saturation.  Treated with 2 weeks of levofloxacin with symptomatic relief.  Completed about 1 week ago.  Now feeling poorly again with low-grade fever, cough and blood-tinged sputum.  WBC 12-13.  COVID and influenza negative.  Chest x-ray with fluid in the fissure but no acute infiltrate.  I have recommended a sputum culture and reinitiate a 2-week course of levofloxacin.  We will try to arrange an immunknow test and plan for bronchoscopy with lavage with his next visit for airway check and cultures.  Abiodun Woods MD

## 2023-09-27 NOTE — TELEPHONE ENCOUNTER
Patient PCP office calls. Suni, PRINCESS, calls with patient update.   Patient was feeling poorly 9/6 and saw PCP. Had a cough, shortness of breath, O2 desaturation at that time. Was started on Levaquin x 2 weeks with symptoms improvement. Last day of Levaquin 9/20. Since stopping, patient says symptoms have started to come back - cough, starting to have more sputum, O2 saturation 92% at rest.     Patient saw PCP today:  - WBC 12-13.   - CXR shows increased fluid in fissure, no pneumonia  - picture of sputum = blood streaked  - last 24hrs, has had intermittent low grade fever.     Requested Dr. Woods connect with PRINCESS. Number provided to Dr. Woods.     Addendum:  Dr. Woods connected with PCP.   - will attempt to obtain sputum culture  - put patient on another Levaquin course   - immunoKnow with next pulm tx appointments  - bronchoscopy with lavage and airway check after next visit with Dr. Woods (visit on 11/14).     Message sent to patient with plan. Requested message/call back with questions, concerns, updates.

## 2023-09-29 ENCOUNTER — ANTICOAGULATION THERAPY VISIT (OUTPATIENT)
Dept: ANTICOAGULATION | Facility: CLINIC | Age: 58
End: 2023-09-29
Payer: COMMERCIAL

## 2023-09-29 ENCOUNTER — LAB (OUTPATIENT)
Dept: LAB | Facility: CLINIC | Age: 58
End: 2023-09-29
Payer: COMMERCIAL

## 2023-09-29 DIAGNOSIS — I48.91 ATRIAL FIBRILLATION, UNSPECIFIED TYPE (H): Primary | ICD-10-CM

## 2023-09-29 DIAGNOSIS — Z79.899 ENCOUNTER FOR LONG-TERM (CURRENT) USE OF HIGH-RISK MEDICATION: ICD-10-CM

## 2023-09-29 DIAGNOSIS — Z94.2 LUNG REPLACED BY TRANSPLANT (H): Primary | ICD-10-CM

## 2023-09-29 LAB — INR (EXTERNAL): 2.6 (ref 0.9–1.1)

## 2023-09-29 PROCEDURE — 80197 ASSAY OF TACROLIMUS: CPT | Performed by: INTERNAL MEDICINE

## 2023-09-29 NOTE — PROGRESS NOTES
ANTICOAGULATION MANAGEMENT     Edson Thornton Jr. 58 year old male is on warfarin with therapeutic INR result. (Goal INR 2.0-3.0)    Recent labs: (last 7 days)     09/29/23  0000   INR 2.6*       ASSESSMENT     Source(s): Chart Review and Patient/Caregiver Call     Warfarin doses taken: Warfarin taken as instructed  Diet: No new diet changes identified  Medication/supplement changes:  Patient started a 14 days course of Levaquin for 14 days yesterday  New illness, injury, or hospitalization: No  Signs or symptoms of bleeding or clotting: No  Previous result: Therapeutic last visit; previously outside of goal range  Additional findings: None       PLAN     Recommended plan for ongoing change(s) affecting INR     Dosing Instructions: Continue your current warfarin dose with next INR in 1 week       Summary  As of 9/29/2023      Full warfarin instructions:  2 mg every Mon, Wed, Fri; 3 mg all other days   Next INR check:  10/6/2023               Telephone call with Bret who verbalizes understanding and agrees to plan and who agrees to plan and repeated back plan correctly    Patient using outside facility for labs    Education provided:   Taking warfarin: Importance of taking warfarin as instructed  Goal range and lab monitoring: goal range and significance of current result and Importance of therapeutic range    Plan made per [facility] anticoagulation protocol    Terrie Cedillo RN  Anticoagulation Clinic  9/29/2023    _______________________________________________________________________     Anticoagulation Episode Summary       Current INR goal:  2.0-3.0   TTR:  84.2 % (11.7 mo)   Target end date:  Indefinite   Send INR reminders to:  [facility]    Indications    Atrial fibrillation  unspecified type (H) [I48.91]  Encounter for long-term (current) use of high-risk medication [Z79.899]             Comments:               Anticoagulation Care Providers       Provider Role Specialty Phone number    Abiodun Woods  MD Yanick Referring Pulmonary Disease [phone]

## 2023-10-02 LAB
TACROLIMUS BLD-MCNC: 10.9 UG/L (ref 5–15)
TME LAST DOSE: NORMAL H
TME LAST DOSE: NORMAL H

## 2023-10-04 ENCOUNTER — TELEPHONE (OUTPATIENT)
Dept: TRANSPLANT | Facility: CLINIC | Age: 58
End: 2023-10-04
Payer: COMMERCIAL

## 2023-10-04 DIAGNOSIS — Z94.2 LUNG REPLACED BY TRANSPLANT (H): ICD-10-CM

## 2023-10-04 DIAGNOSIS — D84.9 IMMUNOSUPPRESSED STATUS (H): ICD-10-CM

## 2023-10-04 NOTE — TELEPHONE ENCOUNTER
Patient calls to review tacrolimus dose change plan.  Patient currently taking 0.5 mg twice daily.  Tacrolimus level high at 10.9.  Goal 8-10.  Instructed patient to decrease dose to 0.4 mg in AM and 0.4 mg in p.m. and recheck level in 5 to 7 days.    During clinic visit yesterday patient reported current dose of tacrolimus was 0.5 mg in AM and 0.4 mg in PM, but was really taking 0.5 mg twice daily.  Dose adjusted accordingly.

## 2023-10-09 ENCOUNTER — ANTICOAGULATION THERAPY VISIT (OUTPATIENT)
Dept: ANTICOAGULATION | Facility: CLINIC | Age: 58
End: 2023-10-09
Payer: COMMERCIAL

## 2023-10-09 DIAGNOSIS — I48.91 ATRIAL FIBRILLATION, UNSPECIFIED TYPE (H): Primary | ICD-10-CM

## 2023-10-09 DIAGNOSIS — Z79.899 ENCOUNTER FOR LONG-TERM (CURRENT) USE OF HIGH-RISK MEDICATION: ICD-10-CM

## 2023-10-09 NOTE — PROGRESS NOTES
ANTICOAGULATION MANAGEMENT     Edson Thornton Jr. 58 year old male is on warfarin with therapeutic INR result. (Goal INR 2.0-3.0)    Recent labs: (last 7 days)     10/06/23  0734   INR 2.7*       ASSESSMENT     Source(s): Patient/Caregiver Call     Warfarin doses taken: Warfarin taken as instructed  Diet: No new diet changes identified  Medication/supplement changes:  Tacrolimus was decreased   New illness, injury, or hospitalization: No  Signs or symptoms of bleeding or clotting: No  Previous result: Therapeutic last 2(+) visits  Additional findings: None       PLAN     Recommended plan for no diet, medication or health factor changes affecting INR     Dosing Instructions: Continue your current warfarin dose with next INR in 1 week       Summary  As of 10/9/2023      Full warfarin instructions:  2 mg every Mon, Wed, Fri; 3 mg all other days   Next INR check:  10/13/2023               Telephone call with Bret who verbalizes understanding and agrees to plan and who agrees to plan and repeated back plan correctly    Patient using outside facility for labs    Education provided:   None required    Plan made per [facility] anticoagulation protocol    Suni Choi RN  Anticoagulation Clinic  10/9/2023    _______________________________________________________________________     Anticoagulation Episode Summary       Current INR goal:  2.0-3.0   TTR:  84.1 % (11.6 mo)   Target end date:  Indefinite   Send INR reminders to:  [facility]    Indications    Atrial fibrillation  unspecified type (H) [I48.91]  Encounter for long-term (current) use of high-risk medication [Z79.899]             Comments:               Anticoagulation Care Providers       Provider Role Specialty Phone number    Abiodun Woods MD Referring Pulmonary Disease [phone]

## 2023-10-10 ENCOUNTER — LAB (OUTPATIENT)
Dept: LAB | Facility: CLINIC | Age: 58
End: 2023-10-10
Payer: COMMERCIAL

## 2023-10-10 PROCEDURE — 80197 ASSAY OF TACROLIMUS: CPT | Performed by: INTERNAL MEDICINE

## 2023-10-11 ENCOUNTER — DOCUMENTATION ONLY (OUTPATIENT)
Dept: TRANSPLANT | Facility: CLINIC | Age: 58
End: 2023-10-11
Payer: COMMERCIAL

## 2023-10-11 ASSESSMENT — ENCOUNTER SYMPTOMS: NEW SYMPTOMS OF CORONARY ARTERY DISEASE: 0

## 2023-10-12 LAB
TACROLIMUS BLD-MCNC: 4.9 UG/L (ref 5–15)
TME LAST DOSE: ABNORMAL H
TME LAST DOSE: ABNORMAL H

## 2023-10-13 ENCOUNTER — LAB (OUTPATIENT)
Dept: LAB | Facility: CLINIC | Age: 58
End: 2023-10-13
Payer: COMMERCIAL

## 2023-10-13 DIAGNOSIS — Z94.2 LUNG REPLACED BY TRANSPLANT (H): ICD-10-CM

## 2023-10-13 PROCEDURE — 80197 ASSAY OF TACROLIMUS: CPT

## 2023-10-16 ENCOUNTER — TELEPHONE (OUTPATIENT)
Dept: TRANSPLANT | Facility: CLINIC | Age: 58
End: 2023-10-16
Payer: COMMERCIAL

## 2023-10-16 ENCOUNTER — ANTICOAGULATION THERAPY VISIT (OUTPATIENT)
Dept: ANTICOAGULATION | Facility: CLINIC | Age: 58
End: 2023-10-16
Payer: COMMERCIAL

## 2023-10-16 DIAGNOSIS — Z94.2 LUNG REPLACED BY TRANSPLANT (H): Primary | ICD-10-CM

## 2023-10-16 DIAGNOSIS — Z79.899 ENCOUNTER FOR LONG-TERM (CURRENT) USE OF HIGH-RISK MEDICATION: ICD-10-CM

## 2023-10-16 DIAGNOSIS — I48.91 ATRIAL FIBRILLATION, UNSPECIFIED TYPE (H): Primary | ICD-10-CM

## 2023-10-16 LAB
TACROLIMUS BLD-MCNC: 5.2 UG/L (ref 5–15)
TME LAST DOSE: NORMAL H
TME LAST DOSE: NORMAL H

## 2023-10-16 NOTE — PROGRESS NOTES
ANTICOAGULATION MANAGEMENT     Edson Thornton Jr. 58 year old male is on warfarin with therapeutic INR result. (Goal INR 2.0-3.0)    Recent labs: (last 7 days)     10/13/23  0735   INR 2.9*       ASSESSMENT     Source(s): Chart Review and Patient/Caregiver Call     Warfarin doses taken: Warfarin taken as instructed  Diet: No new diet changes identified  Medication/supplement changes: None noted  New illness, injury, or hospitalization: No  Signs or symptoms of bleeding or clotting: No  Previous result: Therapeutic last 2(+) visits  Additional findings: None       PLAN     Recommended plan for no diet, medication or health factor changes affecting INR     Dosing Instructions: Continue your current warfarin dose with next INR in 1 week       Summary  As of 10/16/2023      Full warfarin instructions:  2 mg every Mon, Wed, Fri; 3 mg all other days   Next INR check:  10/20/2023               Telephone call with Bret who verbalizes understanding and agrees to plan    Patient using outside facility for labs    Education provided:   Goal range and lab monitoring: goal range and significance of current result and Importance of therapeutic range    Plan made per [facility] anticoagulation protocol    Jerald Garcia RN  Anticoagulation Clinic  10/16/2023    _______________________________________________________________________     Anticoagulation Episode Summary       Current INR goal:  2.0-3.0   TTR:  84.1% (11.6 mo)   Target end date:  Indefinite   Send INR reminders to:  [facility]    Indications    Atrial fibrillation  unspecified type (H) [I48.91]  Encounter for long-term (current) use of high-risk medication [Z79.899]             Comments:               Anticoagulation Care Providers       Provider Role Specialty Phone number    Abiodun Woods MD Referring Pulmonary Disease [phone]

## 2023-10-17 ENCOUNTER — TELEPHONE (OUTPATIENT)
Dept: TRANSPLANT | Facility: CLINIC | Age: 58
End: 2023-10-17
Payer: COMMERCIAL

## 2023-10-17 DIAGNOSIS — Z94.2 LUNG REPLACED BY TRANSPLANT (H): ICD-10-CM

## 2023-10-17 DIAGNOSIS — D84.9 IMMUNOSUPPRESSED STATUS (H): ICD-10-CM

## 2023-10-17 NOTE — TELEPHONE ENCOUNTER
Patient calls in regards to the tacrolimus level and dosing plan. Confirmed with patient he is taking 0.5 ml and 0.4 ml and to INCREASE dose to 0.5 ml BID. Recheck a level on Monday 10/23.

## 2023-10-23 ENCOUNTER — ANTICOAGULATION THERAPY VISIT (OUTPATIENT)
Dept: ANTICOAGULATION | Facility: CLINIC | Age: 58
End: 2023-10-23
Payer: COMMERCIAL

## 2023-10-23 ENCOUNTER — LAB (OUTPATIENT)
Dept: LAB | Facility: CLINIC | Age: 58
End: 2023-10-23

## 2023-10-23 DIAGNOSIS — I48.91 ATRIAL FIBRILLATION, UNSPECIFIED TYPE (H): Primary | ICD-10-CM

## 2023-10-23 DIAGNOSIS — Z94.2 LUNG REPLACED BY TRANSPLANT (H): ICD-10-CM

## 2023-10-23 DIAGNOSIS — Z79.899 ENCOUNTER FOR LONG-TERM (CURRENT) USE OF HIGH-RISK MEDICATION: ICD-10-CM

## 2023-10-23 LAB — INR (EXTERNAL): 2.5 (ref 0.9–1.1)

## 2023-10-23 PROCEDURE — 80197 ASSAY OF TACROLIMUS: CPT | Performed by: INTERNAL MEDICINE

## 2023-10-23 NOTE — PROGRESS NOTES
ANTICOAGULATION MANAGEMENT     Edson Thornton Jr. 58 year old male is on warfarin with therapeutic INR result. (Goal INR 2.0-3.0)    Recent labs: (last 7 days)     10/23/23  0000   INR 2.5*       ASSESSMENT     Source(s): Patient/Caregiver Call     Warfarin doses taken: Warfarin taken as instructed  Diet: No new diet changes identified  Medication/supplement changes: None noted  New illness, injury, or hospitalization: No  Signs or symptoms of bleeding or clotting: No  Previous result: Therapeutic last 2(+) visits  Additional findings: None       PLAN     Recommended plan for no diet, medication or health factor changes affecting INR     Dosing Instructions: Continue your current warfarin dose with next INR in 4 days       Summary  As of 10/23/2023      Full warfarin instructions:  2 mg every Mon, Wed, Fri; 3 mg all other days   Next INR check:  10/27/2023               Telephone call with Bret who verbalizes understanding and agrees to plan and who agrees to plan and repeated back plan correctly    Patient using outside facility for labs    Education provided:   None required    Plan made per [facility] anticoagulation protocol    Suni Choi RN  Anticoagulation Clinic  10/23/2023    _______________________________________________________________________     Anticoagulation Episode Summary       Current INR goal:  2.0-3.0   TTR:  84.2% (11.7 mo)   Target end date:  Indefinite   Send INR reminders to:  [facility]    Indications    Atrial fibrillation  unspecified type (H) [I48.91]  Encounter for long-term (current) use of high-risk medication [Z79.899]             Comments:               Anticoagulation Care Providers       Provider Role Specialty Phone number    Abiodun Woods MD Referring Pulmonary Disease [phone]

## 2023-10-25 LAB
TACROLIMUS BLD-MCNC: 9.2 UG/L (ref 5–15)
TME LAST DOSE: NORMAL H
TME LAST DOSE: NORMAL H

## 2023-11-01 ENCOUNTER — TELEPHONE (OUTPATIENT)
Dept: TRANSPLANT | Facility: CLINIC | Age: 58
End: 2023-11-01
Payer: COMMERCIAL

## 2023-11-01 DIAGNOSIS — U07.1 CLINICAL DIAGNOSIS OF COVID-19: Primary | ICD-10-CM

## 2023-11-01 NOTE — TELEPHONE ENCOUNTER
Pt provided a sputum sample and will hold off on Levaquin until sputum results. See other note for plan to treat with Remdesivir infusions.

## 2023-11-01 NOTE — TELEPHONE ENCOUNTER
July 15, 2019       Allie Dan  [address] [address]        Dear Ms. Dan,    Please review the enclosed prep instructions for your procedure with Angel Stanley MD.    Date of Procedure: July 29, 2019  Time of Procedure: Someone from the hospital will call you 3-5 business days prior with your procedure and arrival times. If you have not received a call from the Pre-admission Testing nurse within 3 business days of your scheduled procedure, please call [phone] for your time of procedure and pre-procedure instructions.   Location:    [address] [address]  [phone]      TRANSPORTATION:  · A responsible adult must drive you home after the procedure.  The medication given during the procedure may cause drowsiness, making it unsafe for you to drive or operate machinery.  A taxi is not acceptable transportation. Please make arrangements in advance or we will not be able to do your procedure.  · Please make arrangements for someone to stay with you or check in on you frequently the rest of the day/evening until the drowsiness has completely worn off.      If you have any questions regarding these instructions, please call [phone].     Thank you,    [place] [phone]  Surgery Scheduler for Angel Stanley MD                  ESOPHAGOGASTRODUODENOSCOPY (EGD)  PRE-PROCEDURE INSTRUCTIONS  Angel Stanley MD    Please read these instructions thoroughly. If you have any questions, please call the physician's office at [phone]     CANCELLATION AND RESCHEDULE POLICY:  · In the event that you need to cancel or reschedule your procedure, please call: [place] [phone]    SPECIAL INSTRUCTIONS    SPECIAL CONDITIONS:  · Contact your primary care physician if you have diabetes and take insulin. You may need to have your insulin dose adjusted the day before and the day of the procedure.  · Do not drink any  Patient Call: General  Route to LPN    Reason for call: Pt seen PCP this AM- COVID positive  started back on Levoquin- coughing up crud   needs to review with us     Call back needed? Yes    Return Call Needed  Same as documented in contacts section  When to return call?: Same day: Route High [facility]   alcoholic beverages for at least 24 hours before the procedure.    FIVE (5) DAYS BEFORE THE PROCEDURE:  · Do not take iron supplements or Pepto-Bismol. These products may make it more difficult for the physician to see the inside of the stomach.  · Do not eat products with [place] (a fat substitute found in [employer] and [place] Fat-Free chips)  · It is important to continue to take all other prescribed medications. On the day of the procedure, please take your blood pressure medications with a sip of water.  · Do not take any blood thinning or platelet-modifying medications (such as Coumadin (warfarin), Plavix (clopidogrel), Aggrenox, Ticlid, etc.) unless otherwise directed.    DIET:  · You may follow a normal diet up until midnight prior to your procedure.  · Sips of water after midnight unless directed not to.    DAY OF THE PROCEDURE:  · You may take your medications the morning of your procedure with sips of water.  · You will receive sedation during the procedure: please make sure you have someone to drive you that day.  · Do not plan on going to work or doing anything after the procedure, as you will be drowsy most of the afternoon.    AFTER THE PROCEDURE:  · You may resume your regular diet. Start slow with small amounts and increase as you see how well you do.  · Please call the physician's office that did the procedure if you have further questions or if you need additional care.    · Check with the doctor as to when you can resume your medications after the procedure.    RESULTS:  · If tissue samples were take during your procedure, your results will take approximately 10 working days to return.   · If you have not heard from the doctor's office please call [phone].    Thank you for entrusting your care to [facility].

## 2023-11-01 NOTE — TELEPHONE ENCOUNTER
Local PCP calls to report that patient was seen and is found to be COVID-positive.   Suni, local PCP wanting to prescribe Paxlovid.  Reviewed plan with Dr. Woods, and he would like patient to have remdesivir instead.  Discussed with local PCP, and she will order remdesivir infusions outpatient.  Faxed therapy plan to local PCP with proper dosing.  Also talked to [facility] to make sure they are able to accommodate.  Charge nurse stated that they have medication and will be able to accommodate.    Spoke with patient regarding plan and patient agreeable to plan.    Patient reports coughing up sputum.  Sputum sample obtained this AM.  Patient instructed to monitor oxygen saturations and symptoms, and report to the transplant with any updates.      Update:   Patient on Plaquenil  Local infusion pharmacist states that Plaquenil and remdesivir have interaction.  Interaction is that Plaquenil inhibits action of remdesivir.  Reviewed with transplant pharmacist and Dr. Woods.  Patient to stop Plaquenil while on remdesivir infusions.  Patient will have 5 outpatient infusions.  Will reach out to transplant ID as to when patient should restart Plaquenil.

## 2023-11-03 ENCOUNTER — LAB (OUTPATIENT)
Dept: LAB | Facility: CLINIC | Age: 58
End: 2023-11-03
Payer: COMMERCIAL

## 2023-11-03 ENCOUNTER — ANTICOAGULATION THERAPY VISIT (OUTPATIENT)
Dept: ANTICOAGULATION | Facility: CLINIC | Age: 58
End: 2023-11-03
Payer: COMMERCIAL

## 2023-11-03 DIAGNOSIS — Z94.2 LUNG REPLACED BY TRANSPLANT (H): ICD-10-CM

## 2023-11-03 DIAGNOSIS — Z79.899 ENCOUNTER FOR LONG-TERM (CURRENT) USE OF HIGH-RISK MEDICATION: ICD-10-CM

## 2023-11-03 DIAGNOSIS — I48.91 ATRIAL FIBRILLATION, UNSPECIFIED TYPE (H): Primary | ICD-10-CM

## 2023-11-03 LAB — INR (EXTERNAL): 1.4 (ref 0.9–1.1)

## 2023-11-03 PROCEDURE — 80197 ASSAY OF TACROLIMUS: CPT

## 2023-11-03 NOTE — PROGRESS NOTES
ANTICOAGULATION MANAGEMENT     Edson Thornton Jr. 58 year old male is on warfarin with subtherapeutic INR result. (Goal INR 2.0-3.0)    Recent labs: (last 7 days)     11/03/23  0808   INR 1.4*       ASSESSMENT     Source(s): Chart Review and Patient/Caregiver Call     Warfarin doses taken: Warfarin taken as instructed  Diet: No new diet changes identified-reports no change in PO intake with COVID  Medication/supplement changes:  Remdesivir infusions (no drug drug interaction with Warfarin), no Levaquin, no Paxlovid  New illness, injury, or hospitalization: Yes: COVID+ with cough  Signs or symptoms of bleeding or clotting: No  Previous result: Therapeutic last 2(+) visits  Additional findings: None       PLAN     Recommended plan for temporary change(s) affecting INR     Dosing Instructions: booster dose then continue your current warfarin dose with next INR in 1 week       Summary  As of 11/3/2023      Full warfarin instructions:  11/3: 4 mg; Otherwise 2 mg every Mon, Wed, Fri; 3 mg all other days   Next INR check:  11/10/2023               Telephone call with Bret who agrees to plan and repeated back plan correctly    Patient using outside facility for labs    Education provided:   Goal range and lab monitoring: goal range and significance of current result and Importance of following up at instructed interval  Interaction IS NOT anticipated between warfarin and Remdesivir infusions  Symptom monitoring: monitoring for bleeding signs and symptoms and monitoring for clotting signs and symptoms  Contact [phone] with any changes, questions or concerns.     Plan made per [facility] anticoagulation protocol    ESSIE COLÓN RN  Anticoagulation Clinic  11/3/2023    _______________________________________________________________________     Anticoagulation Episode Summary       Current INR goal:  2.0-3.0   TTR:  82.5% (11.7 mo)   Target end date:  Indefinite   Send INR reminders to:  [facility]    Indications     Atrial fibrillation  unspecified type (H) [I48.91]  Encounter for long-term (current) use of high-risk medication [Z79.899]             Comments:               Anticoagulation Care Providers       Provider Role Specialty Phone number    Abiodun Woods MD Referring Pulmonary Disease [phone]

## 2023-11-06 ENCOUNTER — TELEPHONE (OUTPATIENT)
Dept: TRANSPLANT | Facility: CLINIC | Age: 58
End: 2023-11-06
Payer: COMMERCIAL

## 2023-11-06 LAB
TACROLIMUS BLD-MCNC: 2.7 UG/L (ref 5–15)
TME LAST DOSE: ABNORMAL H
TME LAST DOSE: ABNORMAL H

## 2023-11-06 NOTE — TELEPHONE ENCOUNTER
Patient Call: General  Route to LPN    Reason for call: Bret would like to know what is the quarantine period for Covid, patient stated he tested positive on 11/1/23    Call back needed? Yes    Return Call Needed  Same as documented in contacts section  When to return call?: Same day: Route [facility]

## 2023-11-06 NOTE — TELEPHONE ENCOUNTER
Returned call to pt and confirmed that we recommend isolating for 10 days after testing positive for Covid, based on CDC recommendations.

## 2023-11-07 DIAGNOSIS — D84.9 IMMUNOSUPPRESSED STATUS (H): Primary | ICD-10-CM

## 2023-11-07 DIAGNOSIS — Z94.2 LUNG REPLACED BY TRANSPLANT (H): ICD-10-CM

## 2023-11-07 RX ORDER — TACROLIMUS 1 MG/1
1 CAPSULE ORAL 2 TIMES DAILY
Start: 2023-11-07 | End: 2023-11-16

## 2023-11-13 DIAGNOSIS — H92.21 BLOOD IN EAR CANAL, RIGHT: Primary | ICD-10-CM

## 2023-11-13 NOTE — PROGRESS NOTES
"Transplant Coordinator Note    Reason for visit: Post lung transplant follow up visit   Coordinator: Present   Caregiver:  SO    Health concerns addressed today:  1. COVID 11/1, symptoms - coughing. Treated with Remdesivir x 5 doses.   2. Respiratory - had a lung infection, treated with levaquin, then symptoms came back and checked with COVID along with others. Continues to have cough (improved) with some sputum (yellow, was green when ill).   3.  No fever, chills, night sweats (even with COVID).   4. GI: Appetite \"okay\". No loss of taste/smell with COVID. No nausea, vomiting, abdominal pain, loose stool. Regular BMs.   5. Breathing comfortable at rest. More short of breath with activities - started with COVID, a little better.   6. No chest pain  7. ENT: no sinus pain. Runny nose - clear, at baseline.   8. No new visual changes  9. Plaquenil while on Remdesivir, restart in 5 days, joints are stiff - at baseline.     Activity/rehab: up ad [place], walking around the house.   Oxygen needs: room air, BiPAP NOC (uses \"off and on\")  Pain management/RX: joint/bone pain (wrist and fingers), sees rheumatology next week. Some swelling. Using Volteran Gel.   Diabetic management: on lantus, occasional novolog  Next Bronch due: 9/13/20222  CMV status: D-/R-  EBV status: D+/R+  AC/asa: on coumadin, bridged to Lovenox for bronch (lower dose per CrCl d/t bleeding post bronch x 2)  PJP prophylactic: Bactrim     COVID:  COVID-19 infection (yes/no, date of most recent positive test): most recent positive test: 11/1 - treated with IV Remdesivir   Status/instructions given about COVID-19 vaccine:      Pt Education: medications (use/dose/side effects), how/when to call coordinator, frequency of labs, s/s of infection/rejection, call prior to starting any new medications, lab/vital sign book     Health Maintenance:   Last colonoscopy: 7/2020  Next colonoscopy due: 3-5 years, 7/2023-7/2025  Dermatology: sees dermatology annual locally - next " visit within next month.   Vaccinations this visit:     Labs, CXR, PFTs reviewed with patient  Medication record reviewed and reconciled  Questions and concerns addressed    Patient Instructions  1. Continue to hydrate with 60-70 oz fluids daily.  2. Continue to exercise daily or most days of the week.  3. Follow up with your primary care provider for annual gender health maintenance procedures.  4. Follow up with colonoscopy schedule.  5. Follow up with annual dermatology visits.  6. It doesn't seem like the COVID vaccine is working well in lung transplant patients. A number of lung transplant patients have gotten sick with COVID even after receiving the vaccines. Based on our recent experience, it can be life-threatening to get COVID  even after being vaccinated. Please continue to act like you did not get the COVID vaccine - social distancing, wearing a mask, good hand hygiene, etc. If the people around you are vaccinated, it will help reduce the risk of you getting COVID. All members of your household should be vaccinated.  7. Restart Plaquenil in 5 days.    8. Increase Amlodipine to 10 mg daily (at bedtime) .   9. Wait a couple months to get your COVID vaccine.     Next transplant clinic appointment: 3 months with CXR, labs 6 minute walk test, and full PFTs  Next lab draw: pending tacrolimus level, otherwise monthly.     AVS printed at time of check out

## 2023-11-14 ENCOUNTER — OFFICE VISIT (OUTPATIENT)
Dept: PULMONOLOGY | Facility: CLINIC | Age: 58
End: 2023-11-14
Attending: STUDENT IN AN ORGANIZED HEALTH CARE EDUCATION/TRAINING PROGRAM
Payer: COMMERCIAL

## 2023-11-14 ENCOUNTER — TELEPHONE (OUTPATIENT)
Dept: TRANSPLANT | Facility: CLINIC | Age: 58
End: 2023-11-14

## 2023-11-14 ENCOUNTER — LAB (OUTPATIENT)
Dept: LAB | Facility: CLINIC | Age: 58
End: 2023-11-14
Attending: STUDENT IN AN ORGANIZED HEALTH CARE EDUCATION/TRAINING PROGRAM
Payer: COMMERCIAL

## 2023-11-14 ENCOUNTER — OFFICE VISIT (OUTPATIENT)
Dept: TRANSPLANT | Facility: CLINIC | Age: 58
End: 2023-11-14
Attending: STUDENT IN AN ORGANIZED HEALTH CARE EDUCATION/TRAINING PROGRAM
Payer: COMMERCIAL

## 2023-11-14 ENCOUNTER — ANTICOAGULATION THERAPY VISIT (OUTPATIENT)
Dept: ANTICOAGULATION | Facility: CLINIC | Age: 58
End: 2023-11-14

## 2023-11-14 ENCOUNTER — ANCILLARY PROCEDURE (OUTPATIENT)
Dept: GENERAL RADIOLOGY | Facility: CLINIC | Age: 58
End: 2023-11-14
Attending: STUDENT IN AN ORGANIZED HEALTH CARE EDUCATION/TRAINING PROGRAM
Payer: COMMERCIAL

## 2023-11-14 VITALS
DIASTOLIC BLOOD PRESSURE: 84 MMHG | HEART RATE: 66 BPM | SYSTOLIC BLOOD PRESSURE: 145 MMHG | OXYGEN SATURATION: 96 % | WEIGHT: 145 LBS | BODY MASS INDEX: 24.5 KG/M2

## 2023-11-14 DIAGNOSIS — D84.9 IMMUNOSUPPRESSED STATUS (H): ICD-10-CM

## 2023-11-14 DIAGNOSIS — J18.9 PNEUMONIA OF LEFT LOWER LOBE DUE TO INFECTIOUS ORGANISM: ICD-10-CM

## 2023-11-14 DIAGNOSIS — E83.42 HYPOMAGNESEMIA: ICD-10-CM

## 2023-11-14 DIAGNOSIS — I10 PRIMARY HYPERTENSION: ICD-10-CM

## 2023-11-14 DIAGNOSIS — Z94.2 LUNG REPLACED BY TRANSPLANT (H): Primary | ICD-10-CM

## 2023-11-14 DIAGNOSIS — Z94.2 S/P LUNG TRANSPLANT (H): Primary | Chronic | ICD-10-CM

## 2023-11-14 DIAGNOSIS — I63.9 ISCHEMIC STROKE (H): ICD-10-CM

## 2023-11-14 DIAGNOSIS — Z79.899 ENCOUNTER FOR LONG-TERM (CURRENT) USE OF HIGH-RISK MEDICATION: ICD-10-CM

## 2023-11-14 DIAGNOSIS — D80.1 HYPOGAMMAGLOBULINEMIA (H): ICD-10-CM

## 2023-11-14 DIAGNOSIS — R76.8 LOW IMMUNOGLOBULIN LEVEL: Primary | ICD-10-CM

## 2023-11-14 DIAGNOSIS — Z94.2 LUNG REPLACED BY TRANSPLANT (H): ICD-10-CM

## 2023-11-14 DIAGNOSIS — M81.8 OTHER OSTEOPOROSIS WITHOUT CURRENT PATHOLOGICAL FRACTURE: ICD-10-CM

## 2023-11-14 DIAGNOSIS — B44.9 ASPERGILLUS (H): ICD-10-CM

## 2023-11-14 DIAGNOSIS — I48.91 ATRIAL FIBRILLATION, UNSPECIFIED TYPE (H): Primary | ICD-10-CM

## 2023-11-14 DIAGNOSIS — Z94.2 S/P LUNG TRANSPLANT (H): ICD-10-CM

## 2023-11-14 DIAGNOSIS — I48.91 ATRIAL FIBRILLATION, UNSPECIFIED TYPE (H): ICD-10-CM

## 2023-11-14 LAB
ANION GAP SERPL CALCULATED.3IONS-SCNC: 12 MMOL/L (ref 7–15)
BUN SERPL-MCNC: 18.9 MG/DL (ref 6–20)
CALCIUM SERPL-MCNC: 9.5 MG/DL (ref 8.6–10)
CHLORIDE SERPL-SCNC: 106 MMOL/L (ref 98–107)
CMV DNA SPEC NAA+PROBE-ACNC: NOT DETECTED IU/ML
CREAT SERPL-MCNC: 1.11 MG/DL (ref 0.67–1.17)
DEPRECATED HCO3 PLAS-SCNC: 26 MMOL/L (ref 22–29)
EGFRCR SERPLBLD CKD-EPI 2021: 77 ML/MIN/1.73M2
ERYTHROCYTE [DISTWIDTH] IN BLOOD BY AUTOMATED COUNT: 14.6 % (ref 10–15)
EXPTIME-PRE: 12.06 SEC
FEF2575-%PRED-PRE: 27 %
FEF2575-PRE: 0.69 L/SEC
FEF2575-PRED: 2.54 L/SEC
FEFMAX-%PRED-PRE: 53 %
FEFMAX-PRE: 4.34 L/SEC
FEFMAX-PRED: 8.14 L/SEC
FEV1-%PRED-PRE: 51 %
FEV1-PRE: 1.45 L
FEV1FEV6-PRE: 62 %
FEV1FEV6-PRED: 79 %
FEV1FVC-PRE: 61 %
FEV1FVC-PRED: 80 %
FIFMAX-PRE: 3.67 L/SEC
FVC-%PRED-PRE: 67 %
FVC-PRE: 2.39 L
FVC-PRED: 3.55 L
GLUCOSE SERPL-MCNC: 104 MG/DL (ref 70–99)
HCT VFR BLD AUTO: 41.5 % (ref 40–53)
HGB BLD-MCNC: 13.4 G/DL (ref 13.3–17.7)
IGG SERPL-MCNC: 298 MG/DL (ref 610–1616)
INR PPP: 3.81 (ref 0.85–1.15)
MAGNESIUM SERPL-MCNC: 1.5 MG/DL (ref 1.7–2.3)
MCH RBC QN AUTO: 28.1 PG (ref 26.5–33)
MCHC RBC AUTO-ENTMCNC: 32.3 G/DL (ref 31.5–36.5)
MCV RBC AUTO: 87 FL (ref 78–100)
PLATELET # BLD AUTO: 164 10E3/UL (ref 150–450)
POTASSIUM SERPL-SCNC: 4.2 MMOL/L (ref 3.4–5.3)
RBC # BLD AUTO: 4.77 10E6/UL (ref 4.4–5.9)
SODIUM SERPL-SCNC: 144 MMOL/L (ref 135–145)
TACROLIMUS BLD-MCNC: 19.1 UG/L (ref 5–15)
TME LAST DOSE: ABNORMAL H
TME LAST DOSE: ABNORMAL H
WBC # BLD AUTO: 8.3 10E3/UL (ref 4–11)

## 2023-11-14 PROCEDURE — 99000 SPECIMEN HANDLING OFFICE-LAB: CPT | Performed by: PATHOLOGY

## 2023-11-14 PROCEDURE — 86832 HLA CLASS I HIGH DEFIN QUAL: CPT | Performed by: INTERNAL MEDICINE

## 2023-11-14 PROCEDURE — 99213 OFFICE O/P EST LOW 20 MIN: CPT | Performed by: INTERNAL MEDICINE

## 2023-11-14 PROCEDURE — 80048 BASIC METABOLIC PNL TOTAL CA: CPT | Performed by: PATHOLOGY

## 2023-11-14 PROCEDURE — 87449 NOS EACH ORGANISM AG IA: CPT | Mod: 90 | Performed by: PATHOLOGY

## 2023-11-14 PROCEDURE — 87799 DETECT AGENT NOS DNA QUANT: CPT | Performed by: INTERNAL MEDICINE

## 2023-11-14 PROCEDURE — 99215 OFFICE O/P EST HI 40 MIN: CPT | Mod: 25 | Performed by: INTERNAL MEDICINE

## 2023-11-14 PROCEDURE — 86833 HLA CLASS II HIGH DEFIN QUAL: CPT | Performed by: INTERNAL MEDICINE

## 2023-11-14 PROCEDURE — 80197 ASSAY OF TACROLIMUS: CPT | Performed by: INTERNAL MEDICINE

## 2023-11-14 PROCEDURE — 71046 X-RAY EXAM CHEST 2 VIEWS: CPT | Mod: GC | Performed by: RADIOLOGY

## 2023-11-14 PROCEDURE — 87305 ASPERGILLUS AG IA: CPT | Mod: 90 | Performed by: PATHOLOGY

## 2023-11-14 PROCEDURE — 82784 ASSAY IGA/IGD/IGG/IGM EACH: CPT | Performed by: STUDENT IN AN ORGANIZED HEALTH CARE EDUCATION/TRAINING PROGRAM

## 2023-11-14 PROCEDURE — 83735 ASSAY OF MAGNESIUM: CPT | Performed by: PATHOLOGY

## 2023-11-14 PROCEDURE — 86352 CELL FUNCTION ASSAY W/STIM: CPT | Performed by: INTERNAL MEDICINE

## 2023-11-14 PROCEDURE — 94375 RESPIRATORY FLOW VOLUME LOOP: CPT | Performed by: INTERNAL MEDICINE

## 2023-11-14 PROCEDURE — 85027 COMPLETE CBC AUTOMATED: CPT | Performed by: PATHOLOGY

## 2023-11-14 PROCEDURE — 85610 PROTHROMBIN TIME: CPT | Performed by: PATHOLOGY

## 2023-11-14 PROCEDURE — 36415 COLL VENOUS BLD VENIPUNCTURE: CPT | Performed by: PATHOLOGY

## 2023-11-14 RX ORDER — EPINEPHRINE 1 MG/ML
0.3 INJECTION, SOLUTION, CONCENTRATE INTRAVENOUS EVERY 5 MIN PRN
Status: CANCELLED | OUTPATIENT
Start: 2023-11-14

## 2023-11-14 RX ORDER — ALBUTEROL SULFATE 0.83 MG/ML
2.5 SOLUTION RESPIRATORY (INHALATION)
Status: CANCELLED | OUTPATIENT
Start: 2023-11-14

## 2023-11-14 RX ORDER — DIPHENHYDRAMINE HYDROCHLORIDE 50 MG/ML
50 INJECTION INTRAMUSCULAR; INTRAVENOUS
Status: CANCELLED
Start: 2023-11-14

## 2023-11-14 RX ORDER — AMLODIPINE BESYLATE 10 MG/1
10 TABLET ORAL AT BEDTIME
Qty: 30 TABLET | Refills: 11 | Status: SHIPPED | OUTPATIENT
Start: 2023-11-14

## 2023-11-14 RX ORDER — DIAPER,BRIEF,INFANT-TODD,DISP
1 EACH MISCELLANEOUS DAILY
Qty: 30 TABLET | Refills: 11 | Status: SHIPPED | OUTPATIENT
Start: 2023-11-14 | End: 2024-04-08

## 2023-11-14 RX ORDER — LIDOCAINE 40 MG/G
CREAM TOPICAL
Status: CANCELLED | OUTPATIENT
Start: 2023-11-15

## 2023-11-14 RX ORDER — ACETAMINOPHEN 325 MG/1
650 TABLET ORAL ONCE
Status: CANCELLED
Start: 2023-11-14

## 2023-11-14 RX ORDER — DIPHENHYDRAMINE HCL 25 MG
50 CAPSULE ORAL ONCE
Status: CANCELLED
Start: 2023-11-14

## 2023-11-14 RX ORDER — MEPERIDINE HYDROCHLORIDE 25 MG/ML
25 INJECTION INTRAMUSCULAR; INTRAVENOUS; SUBCUTANEOUS EVERY 30 MIN PRN
Status: CANCELLED | OUTPATIENT
Start: 2023-11-14

## 2023-11-14 RX ORDER — METHYLPREDNISOLONE SODIUM SUCCINATE 125 MG/2ML
125 INJECTION, POWDER, LYOPHILIZED, FOR SOLUTION INTRAMUSCULAR; INTRAVENOUS
Status: CANCELLED
Start: 2023-11-14

## 2023-11-14 RX ORDER — ALBUTEROL SULFATE 90 UG/1
1-2 AEROSOL, METERED RESPIRATORY (INHALATION)
Status: CANCELLED
Start: 2023-11-14

## 2023-11-14 ASSESSMENT — PAIN SCALES - GENERAL: PAINLEVEL: NO PAIN (0)

## 2023-11-14 NOTE — TELEPHONE ENCOUNTER
Spoke with Bret regarding low IGG level, per Dr. Woods we need to treat with IVIG x1 and recheck IGG level 1 month after dose. Checking to see if any availability next couple of days at [facility].    Also wanting to confirm tacrolimus level from today and if this was a 12 hour trough. Patient states he did not take the dose this morning prior to lab and was a 13 hour trough as far as he knew. Currently taking 1 mg BID. Will REDUCE to 0.5 mg BID and recheck on Tuesday next week locally.

## 2023-11-14 NOTE — LETTER
"    11/14/2023         RE: Edson Thornton Jr.  [address]  [place] [address]        Dear Colleague,    Thank you for referring your patient, Edson Thornton Jr., to the [facility]. Please see a copy of my visit note below.    Transplant Coordinator Note    Reason for visit: Post lung transplant follow up visit   Coordinator: Present   Caregiver:  SO    Health concerns addressed today:  1. COVID 11/1, symptoms - coughing. Treated with Remdesivir x 5 doses.   2. Respiratory - had a lung infection, treated with levaquin, then symptoms came back and checked with COVID along with others. Continues to have cough (improved) with some sputum (yellow, was green when ill).   3.  No fever, chills, night sweats (even with COVID).   4. GI: Appetite \"okay\". No loss of taste/smell with COVID. No nausea, vomiting, abdominal pain, loose stool. Regular BMs.   5. Breathing comfortable at rest. More short of breath with activities - started with COVID, a little better.   6. No chest pain  7. ENT: no sinus pain. Runny nose - clear, at baseline.   8. No new visual changes  9. Plaquenil while on Remdesivir, restart in 5 days, joints are stiff - at baseline.     Activity/rehab: up ad [place], walking around the house.   Oxygen needs: room air, BiPAP NOC (uses \"off and on\")  Pain management/RX: joint/bone pain (wrist and fingers), sees rheumatology next week. Some swelling. Using Volteran Gel.   Diabetic management: on lantus, occasional novolog  Next Bronch due: 9/13/20222  CMV status: D-/R-  EBV status: D+/R+  AC/asa: on coumadin, bridged to Lovenox for bronch (lower dose per CrCl d/t bleeding post bronch x 2)  PJP prophylactic: Bactrim     COVID:  COVID-19 infection (yes/no, date of most recent positive test): most recent positive test: 11/1 - treated with IV Remdesivir   Status/instructions given about COVID-19 vaccine:      Pt Education: medications (use/dose/side effects), how/when to call " coordinator, frequency of labs, s/s of infection/rejection, call prior to starting any new medications, lab/vital sign book     Health Maintenance:   Last colonoscopy: 7/2020  Next colonoscopy due: 3-5 years, 7/2023-7/2025  Dermatology: sees dermatology annual locally - next visit within next month.   Vaccinations this visit:     Labs, CXR, PFTs reviewed with patient  Medication record reviewed and reconciled  Questions and concerns addressed    Patient Instructions  1. Continue to hydrate with 60-70 oz fluids daily.  2. Continue to exercise daily or most days of the week.  3. Follow up with your primary care provider for annual gender health maintenance procedures.  4. Follow up with colonoscopy schedule.  5. Follow up with annual dermatology visits.  6. It doesn't seem like the COVID vaccine is working well in lung transplant patients. A number of lung transplant patients have gotten sick with COVID even after receiving the vaccines. Based on our recent experience, it can be life-threatening to get COVID  even after being vaccinated. Please continue to act like you did not get the COVID vaccine - social distancing, wearing a mask, good hand hygiene, etc. If the people around you are vaccinated, it will help reduce the risk of you getting COVID. All members of your household should be vaccinated.  7. Restart Plaquenil in 5 days.    8. Increase Amlodipine to 10 mg daily (at bedtime) .   9. Wait a couple months to get your COVID vaccine.     Next transplant clinic appointment: 3 months with CXR, labs 6 minute walk test, and full PFTs  Next lab draw: pending tacrolimus level, otherwise monthly.     AVS printed at time of check out        Reason for Visit  Edson Thornton JrJaneth is a 58 year old year old male who is being seen for RECHECK (3 month follow up)      Assessment and plan:   Edson Thornton is a 58 year old male with NSIP/ILD, bronchiectasis, moderate PH, RA, SALTY, chronic HSV infection, hypogammaglobulinemia,  steroid-induced diabetes, hypothyroidism, PFO, HTN, HLD, duodenal anomaly, anxiety, and depression. Admitted on 9/5/21 from OSH for acute on chronic respiratory failure 2/2 ILD exacerbation now s/p BSLT on 10/16/21. Prolonged vent wean s/p trach and PEG/J tube.  Post-op course also complicated by encephalopathy and diffuse weakness, acute to subacute CVA, afib with RVR, BRENNAN, GI bleed, Candidemia/Candida empyema, and anxiety. Code called 11/2 for bradycardia/asystole, progressive hypotension, found to have significant GI bleed, EGD with adherent clot near PEG tube site that was clipped.  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 His exercise tolerance and PFTs have not reached the level expected.     Pulmonary/lung transplant: Breathing is comfortable at rest.  The patient does note some increase in his dyspnea on exertion since his recent COVID infection.  Oxygenation adequate at 96%.  PFTs with moderate obstructive ventilatory defect, decreased from previous and below recent best but within his usual range.  Chest x-ray, reviewed by me with mild basilar atelectasis, no acute infiltrate and no change from previous.  Respiratory symptoms and small decline in PFTs are likely related to recent COVID infection.  He has been treated with 5 days of remdesivir.  Symptomatically improving.  No specific intervention is indicated at this time.  He was switched from CellCept to Myfortic due to ongoing diarrhea.  He will continue his current prednisone, Myfortic and tacrolimus.  Tacrolimus will be adjusted to maintain a level of 8-10.  Bronchoscopic airway exam is scheduled for tomorrow to evaluate for airway abnormalities that may be contributing to his recurrent respiratory infections.  The patient is anticoagulated so biopsies will not be pursued.  Immuknow will be checked with his next  visit.    Positive crossmatch: The patient had a retrospective positive crossmatch following transplantation, attributed to rituximab received in June 2022.  Subsequent DSA is positive, waxing and waning but consistently below the MFI threshold for treatment and most recently negative.  Date DSA mfi Biopsy (date) Treatment   10/17/2021  none         10/23/2021  none         11/1/2021  none         11/15/2021  none         11/29/2021  DQ B5  2522       12/6/2021  DQ B5  1873       12/12/2021  DQ B5  1703       12/27/2021  DQ B5  3924       1/3/2022  DQ B5  3072       1/10/2022  DQ B5  4032       1/17/2022  DQB5  1849       2/3/2022 DQB5   2488       2/7/2022 DQB5  1874       2/10/2022 DQB5  1781       3/15/2022 DQB5  2254       3/21/2022 DQB5  2117       4/18/2022 DQB5   908       6/20/2022  DQB5  1100       6/29/2022  DQB5  1411       9/12/2022 DQB5  1681       11/14/2022 DQB5  891       12/20/2022  DQ B5  1553       3/8/2023  DQ B5  551       4/25/2023 DQ B5 None       8/1 1/2023 DQ B5 None            Aspergillus: Treated with voriconazole.  Dose increase was required due to subtherapeutic level.  Lung nodules resolved on CT but Fungitell was positive so voriconazole was continued.  Fungitell is pending today, if normalized, voriconazole will be discontinued.    COVID: COVID infection at the beginning of the month.  The patient has completed remdesivir.  Symptomatically improving.  No further intervention is indicated at this time.    Prophylaxis: Continue Bactrim for PJP and nocardia.  CMV -/-.  Continue monitoring.    Hypogammaglobulinemia: IgG level is very low at 298.  Immunoglobulin replacement will be pursued.  IgG will be rechecked in 1 month and replaced as needed.  With his frequent respiratory infections, monthly IgG will be maintained until his endogenous immunoglobulins have normalized.    Ophthalmology: History of double vision and visual loss at the time of transplant.  Defer to ophthalmology for  ongoing follow-up.    Rheumatoid arthritis: Plaquenil was held due to interaction with remdesivir.  Initiate 10 days after completion of remdesivir.    Atrial fibrillation with RVR: Patient appears to be in sinus rhythm on exam today.  Continue propranolol and warfarin.    Obstructive sleep apnea: The patient was encouraged to be consistent with his BiPAP.    Hypertension: Blood pressure is elevated in clinic.  Home readings were 145-150/78-86.  Amlodipine will be increased to 10 mg daily.  Continue propranolol.    Depression/anxiety: Adequately controlled with Cymbalta.    Reflux: Continue pantoprazole.    Steroid-induced diabetes: Blood sugars are adequately controlled with current insulin regimen.    Hypothyroidism: Continue levothyroxine.    Multiple acute/subacute ischemic strokes: etiology likely embolic vs perioperative. MRI brain with infarcts noted in both cerebral hemispheres, left cerebellum, presumed associated with new Afib vs perioperative. Bilateral upper extremity paresis (R>L), suspicion for some cortical blindness. Continue warfarin, HTN and BS control and rosuvastatin.   Bone health: Continue Reclast per endocrinology.    CKD: Patient has a history of stage IIIa CKD.  Creatinine has normalized.  Monitoring.    Healthcare maintenance: The patient has received his influenza vaccine for this flu season.  COVID booster will be pursued in 4 to 6 weeks, when he is fully recovered from the current COVID infection.    The patient will follow-up in 3 months with annual studies.    IAbiodun, have spent 60 minutes on the day of the visit to review the chart, interview and examine the patient, review labs and imaging, formulate a plan, document and submit orders. Time documented is excluding time spent for PFT interpretation.   Abiodun Woods MD     Lung TX HPI  Transplants:  10/16/2021 (Lung), Postoperative day:  759    The patient was seen and examined by Abiodun Woods MD   [place]  KEVEN Thornton is a 58 year old male with NSIP/ILD, bronchiectasis, moderate PH, RA, SALTY, chronic HSV infection, hypogammaglobulinemia, steroid-induced diabetes, hypothyroidism, PFO, HTN, HLD, duodenal anomaly, anxiety, and depression. Admitted on 9/5/21 from OSH for acute on chronic respiratory failure 2/2 ILD exacerbation now s/p BSLT on 10/16/21. Prolonged vent wean s/p trach and PEG/J tube.  Post-op course also complicated by encephalopathy and diffuse weakness, acute to subacute CVA, afib with RVR, BRENNAN, GI bleed, Candidemia/Candida empyema, and anxiety. Code called 11/2 for bradycardia/asystole, progressive hypotension, found to have significant GI bleed, EGD with adherent clot near PEG tube site that was clipped.  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 His exercise tolerance and PFTs have not reached the level expected.     The patient was hospitalized in July for community-acquired pneumonia.  He also required a course of levofloxacin in early September and again in late September for respiratory infections.    On 11/1, the patient developed chest congestion, cough and wheeze.  He was found to be COVID-positive.  He was treated with remdesivir.  Plaquenil was held due to the interaction between Plaquenil and remdesivir.    Breathing is comfortable at rest.  Dyspnea with moderate exertion, increased with COVID, gradually improving but not to baseline.  No chest pain.  Increased cough with COVID, improving but not resolved.  Initially the patient had green sputum now yellow and decreasing in volume.  No fever, chills or night sweats.  No chest pain.    Review of systems:  Appetite is good  Clear rhinorrhea  No nausea, vomiting, diarrhea or abdominal pain  Palpitations  Due for dermatology visit this month.  Hand stiffness, similar to baseline  Easy bruising  A complete ROS was  otherwise negative except as noted in the HPI.    Current Outpatient Medications   Medication     amLODIPine (NORVASC) 10 MG tablet     azelastine (ASTELIN) 0.1 % nasal spray     Calcium Carbonate-Vitamin D (CALCIUM 600 +D HIGH POTENCY) 600-10 MG-MCG TABS     diclofenac (VOLTAREN) 1 % topical gel     famotidine (PEPCID) 20 MG tablet     ferrous sulfate (FEROSUL) 325 (65 Fe) MG tablet     FLUoxetine (PROZAC) 20 MG capsule     fluticasone-vilanterol (BREO ELLIPTA) 200-25 MCG/ACT inhaler     hydroxychloroquine (PLAQUENIL) 200 MG tablet     insulin aspart (NOVOLOG PEN) 100 UNIT/ML pen     lamoTRIgine (LAMICTAL) 25 MG tablet     levalbuterol (XOPENEX HFA) 45 MCG/ACT inhaler     levothyroxine (SYNTHROID/LEVOTHROID) 25 MCG tablet     Magnesium Glycinate 100 MG CAPS     Melatonin 10 MG TABS tablet     mirabegron (MYRBETRIQ) 50 MG 24 hr tablet     mirtazapine (REMERON) 7.5 MG tablet     multivitamin w/minerals (THERA-VIT-M) tablet     MYFORTIC (BRAND) 360 MG EC tablet     ondansetron (ZOFRAN-ODT) 4 MG ODT tab     pantoprazole (PROTONIX) 40 MG EC tablet     predniSONE (DELTASONE) 2.5 MG tablet     predniSONE (DELTASONE) 5 MG tablet     propranolol (INDERAL) 20 MG tablet     rOPINIRole (REQUIP) 0.25 MG tablet     rosuvastatin (CRESTOR) 10 MG tablet     senna-docusate (SENOKOT-S/PERICOLACE) 8.6-50 MG tablet     sulfamethoxazole-trimethoprim (BACTRIM) 400-80 MG tablet     tacrolimus (GENERIC) 1 MG capsule     tamsulosin (FLOMAX) 0.4 MG capsule     voriconazole (VFEND) 200 MG tablet     voriconazole (VFEND) 50 MG tablet     warfarin ANTICOAGULANT (COUMADIN) 1 MG tablet     acetaminophen (TYLENOL) 325 MG tablet     insulin pen needle (32G X 4 MM) 32G X 4 MM miscellaneous     tacrolimus (GENERIC) 1 mg/mL suspension     No current facility-administered medications for this visit.     No Known Allergies  Past Medical History:   Diagnosis Date     BRENNAN (acute kidney injury) (H24) 10/17/2021     Anxiety      Depression      HLD  (hyperlipidemia)      HTN (hypertension)      Hypothyroidism      ILD (interstitial lung disease) (H)      Infection due to 2019 novel coronavirus 03/2023     SALTY on CPAP      Oxygen dependent     BL 4L since ~6/2021     Primary hypertension 02/28/2022     Rheumatoid arthritis (H)     signs ~5/2020, dx 5/2021     S/P lung transplant (H) 10/16/2021     Shock liver 10/17/2021     Steroid-induced hyperglycemia      Traction bronchiectasis (H)        Past Surgical History:   Procedure Laterality Date     BRONCHOSCOPY (RIGID OR FLEXIBLE), DIAGNOSTIC N/A 1/26/2022    Procedure: BRONCHOSCOPY, WITH BRONCHOALVEOLAR LAVAGE AND BIOPSY;  Surgeon: Perlman, David Morris, MD;  Location:  GI     BRONCHOSCOPY (RIGID OR FLEXIBLE), DIAGNOSTIC N/A 4/19/2022    Procedure: BRONCHOSCOPY, WITH BRONCHOALVEOLAR LAVAGE AND BIOPSY;  Surgeon: Sherine Merrill MD;  [facility]     BRONCHOSCOPY (RIGID OR FLEXIBLE), DIAGNOSTIC N/A 6/21/2022    Procedure: BRONCHOSCOPY, WITH BRONCHOALVEOLAR LAVAGE AND BIOPSY;  Surgeon: Aneesh Rubio MD;  [facility]     COLONOSCOPY W/ BIOPSIES AND POLYPECTOMY  07/21/2020     CV CORONARY ANGIOGRAM N/A 09/08/2021    Procedure: Coronary Angiogram with possible intervention;  Surgeon: Jovon Bullock MD;  Location:  HEART CARDIAC CATH LAB     CV RIGHT HEART CATH MEASUREMENTS RECORDED N/A 09/08/2021    Procedure: Right Heart Cath;  Surgeon: Jovon Bullock MD;  Location:  HEART CARDIAC CATH LAB     ESOPHAGOSCOPY, GASTROSCOPY, DUODENOSCOPY (EGD), COMBINED N/A 10/23/2021    Procedure: ESOPHAGOGASTRODUODENOSCOPY (EGD);  Surgeon: Miquel Pisano MD;  [facility]     ESOPHAGOSCOPY, GASTROSCOPY, DUODENOSCOPY (EGD), COMBINED N/A 11/02/2021    Procedure: ESOPHAGOGASTRODUODENOSCOPY (EGD);  Surgeon: Daniel Ortiz MD;  Location:  GI     ESOPHAGOSCOPY, GASTROSCOPY, DUODENOSCOPY (EGD), COMBINED N/A 11/5/2021    Procedure: ESOPHAGOGASTRODUODENOSCOPY (EGD);  Surgeon: oRnnell Hernandez  MD Les;  [facility]     EXTRACTION(S) DENTAL N/A 09/22/2021    Procedure: EXTRACTION tooth #19;  Surgeon: Deepak Tobin DDS;  [facility]     HERNIA REPAIR       IR CHEST TUBE PLACEMENT NON-TUNNELLED LEFT  11/03/2021     PICC TRIPLE LUMEN PLACEMENT Left 11/04/2021    5FR TL PICC. Right non occlusive thrombus subclavian vein.     REPLACE GASTROJEJUNOSTOMY TUBE, PERCUTANEOUS N/A 11/9/2021    Procedure: REPLACEMENT, GASTROJEJUNOSTOMY TUBE, PERCUTANEOUS;  Surgeon: Hernando Rodriguez MD;  [facility]     right acl       TRACHEOSTOMY PERCUTANEOUS N/A 10/29/2021    Procedure: Percutaneous Tracheostomy,;  Surgeon: Celine Jenkins MD;  [facility]     TRANSPLANT LUNG RECIPIENT SINGLE X2 Bilateral 10/16/2021    Procedure: TRANSPLANT, LUNG, RECIPIENT, BILATERAL, Bronchoscopy, on-pump perfusion, bilateral clamshell sternotomy;  Surgeon: Yanick Corral MD;  [facility]     XR ACROMIOCLAVICULAR JOINT BILATERAL         Social History     Socioeconomic History     Marital status: Single     Spouse name: Not on file     Number of children: Not on file     Years of education: Not on file     Highest education level: Not on file   Occupational History     Not on file   Tobacco Use     Smoking status: Never     Smokeless tobacco: Never   Substance and Sexual Activity     Alcohol use: Never     Drug use: Never     Sexual activity: Not on file   Other Topics Concern     Parent/sibling w/ CABG, MI or angioplasty before 65F 55M? Not Asked   Social History Narrative    Full time  for the [employer] starting May 24, 2023.     Social Determinants of Health     Financial Resource Strain: Not on file   Food Insecurity: Not on file   Transportation Needs: Not on file   Physical Activity: Not on file   Stress: Not on file   Social Connections: Not on file   Interpersonal Safety: Not on file   Housing Stability: Not on file         BP (!) 145/84 (BP Location: Right arm, Patient Position:  Sitting, Cuff Size: Adult Regular)   Pulse 66   Wt 65.8 kg (145 lb)   SpO2 96%   BMI 24.50 kg/m    Body mass index is 24.5 kg/m .  Exam:   GENERAL APPEARANCE: Well developed, well nourished, alert, and in no apparent distress.  EYES: PERRL, EOMI  EARS: Canals clear, TMs normal  MOUTH: Oral mucosa is moist, without any lesions, no tonsillar enlargement, no oropharyngeal exudate.  NECK: supple, no masses, no thyromegaly.  LYMPHATICS: No significant axillary, cervical, or supraclavicular nodes.  RESP: normal percussion, good air flow throughout.  No crackles. No rhonchi. Diffuse coarse wheezes.  CV: Normal S1, S2, regular rhythm, normal rate. No murmur.  No rub. No gallop. No LE edema.   ABDOMEN:  Bowel sounds normal, soft, nontender, no HSM or masses.   MS: extremities normal. No clubbing. No cyanosis.  SKIN: no rash on limited exam  NEURO: Mentation intact, speech normal, normal strength and tone, normal gait and stance  PSYCH: mentation appears normal. and affect normal/bright  Results:  Recent Results (from the past 168 hour(s))   INR    Collection Time: 11/14/23  9:03 AM   Result Value Ref Range    INR 3.81 (H) 0.85 - 1.15   Magnesium    Collection Time: 11/14/23  9:03 AM   Result Value Ref Range    Magnesium 1.5 (L) 1.7 - 2.3 mg/dL   CBC with platelets    Collection Time: 11/14/23  9:03 AM   Result Value Ref Range    WBC Count 8.3 4.0 - 11.0 10e3/uL    RBC Count 4.77 4.40 - 5.90 10e6/uL    Hemoglobin 13.4 13.3 - 17.7 g/dL    Hematocrit 41.5 40.0 - 53.0 %    MCV 87 78 - 100 fL    MCH 28.1 26.5 - 33.0 pg    MCHC 32.3 31.5 - 36.5 g/dL    RDW 14.6 10.0 - 15.0 %    Platelet Count 164 150 - 450 10e3/uL   General PFT Lab (Please always keep checked)    Collection Time: 11/14/23  9:05 AM   Result Value Ref Range    FVC-Pred 3.55 L    FVC-Pre 2.39 L    FVC-%Pred-Pre 67 %    FEV1-Pre 1.45 L    FEV1-%Pred-Pre 51 %    FEV1FVC-Pred 80 %    FEV1FVC-Pre 61 %    FEFMax-Pred 8.14 L/sec    FEFMax-Pre 4.34 L/sec     FEFMax-%Pred-Pre 53 %    FEF2575-Pred 2.54 L/sec    FEF2575-Pre 0.69 L/sec    XZJ3849-%Pred-Pre 27 %    ExpTime-Pre 12.06 sec    FIFMax-Pre 3.67 L/sec    FEV1FEV6-Pred 79 %    FEV1FEV6-Pre 62 %         Results as noted above.    PFT Interpretation:  Moderate obstructive ventilatory defect.  Decreased from previous.  Below recent best.   Valid Maneuver                  Again, thank you for allowing me to participate in the care of your patient.        Sincerely,        Abiodun Woods MD

## 2023-11-14 NOTE — PROGRESS NOTES
Reason for Visit  Edson Thornton Jr. is a 58 year old year old male who is being seen for RECHECK (3 month follow up)      Assessment and plan:   Edson Thornton is a 58 year old male with NSIP/ILD, bronchiectasis, moderate PH, RA, SALTY, chronic HSV infection, hypogammaglobulinemia, steroid-induced diabetes, hypothyroidism, PFO, HTN, HLD, duodenal anomaly, anxiety, and depression. Admitted on 9/5/21 from OSH for acute on chronic respiratory failure 2/2 ILD exacerbation now s/p BSLT on 10/16/21. Prolonged vent wean s/p trach and PEG/J tube.  Post-op course also complicated by encephalopathy and diffuse weakness, acute to subacute CVA, afib with RVR, BRENNAN, GI bleed, Candidemia/Candida empyema, and anxiety. Code called 11/2 for bradycardia/asystole, progressive hypotension, found to have significant GI bleed, EGD with adherent clot near PEG tube site that was clipped.  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 His exercise tolerance and PFTs have not reached the level expected.     Pulmonary/lung transplant: Breathing is comfortable at rest.  The patient does note some increase in his dyspnea on exertion since his recent COVID infection.  Oxygenation adequate at 96%.  PFTs with moderate obstructive ventilatory defect, decreased from previous and below recent best but within his usual range.  Chest x-ray, reviewed by me with mild basilar atelectasis, no acute infiltrate and no change from previous.  Respiratory symptoms and small decline in PFTs are likely related to recent COVID infection.  He has been treated with 5 days of remdesivir.  Symptomatically improving.  No specific intervention is indicated at this time.  He was switched from CellCept to Myfortic due to ongoing diarrhea.  He will continue his current prednisone, Myfortic and tacrolimus.  Tacrolimus will be adjusted to  maintain a level of 8-10.  Bronchoscopic airway exam is scheduled for tomorrow to evaluate for airway abnormalities that may be contributing to his recurrent respiratory infections.  The patient is anticoagulated so biopsies will not be pursued.  Immuknow will be checked with his next visit.    Positive crossmatch: The patient had a retrospective positive crossmatch following transplantation, attributed to rituximab received in June 2022.  Subsequent DSA is positive, waxing and waning but consistently below the MFI threshold for treatment and most recently negative.  Date DSA mfi Biopsy (date) Treatment   10/17/2021  none         10/23/2021  none         11/1/2021  none         11/15/2021  none         11/29/2021  DQ B5  2522       12/6/2021  DQ B5  1873       12/12/2021  DQ B5  1703       12/27/2021  DQ B5  3924       1/3/2022  DQ B5  3072       1/10/2022  DQ B5  4032       1/17/2022  DQB5  1849       2/3/2022 DQB5   2488       2/7/2022 DQB5  1874       2/10/2022 DQB5  1781       3/15/2022 DQB5  2254       3/21/2022 DQB5  2117       4/18/2022 DQB5   908       6/20/2022  DQB5  1100       6/29/2022  DQB5  1411       9/12/2022 DQB5  1681       11/14/2022 DQB5  891       12/20/2022  DQ B5  1553       3/8/2023  DQ B5  551       4/25/2023 DQ B5 None       8/1 1/2023 DQ B5 None            Aspergillus: Treated with voriconazole.  Dose increase was required due to subtherapeutic level.  Lung nodules resolved on CT but Fungitell was positive so voriconazole was continued.  Fungitell is pending today, if normalized, voriconazole will be discontinued.    COVID: COVID infection at the beginning of the month.  The patient has completed remdesivir.  Symptomatically improving.  No further intervention is indicated at this time.    Prophylaxis: Continue Bactrim for PJP and nocardia.  CMV -/-.  Continue monitoring.    Hypogammaglobulinemia: IgG level is very low at 298.  Immunoglobulin replacement will be pursued.  IgG will be  rechecked in 1 month and replaced as needed.  With his frequent respiratory infections, monthly IgG will be maintained until his endogenous immunoglobulins have normalized.    Ophthalmology: History of double vision and visual loss at the time of transplant.  Defer to ophthalmology for ongoing follow-up.    Rheumatoid arthritis: Plaquenil was held due to interaction with remdesivir.  Initiate 10 days after completion of remdesivir.    Atrial fibrillation with RVR: Patient appears to be in sinus rhythm on exam today.  Continue propranolol and warfarin.    Obstructive sleep apnea: The patient was encouraged to be consistent with his BiPAP.    Hypertension: Blood pressure is elevated in clinic.  Home readings were 145-150/78-86.  Amlodipine will be increased to 10 mg daily.  Continue propranolol.    Depression/anxiety: Adequately controlled with Cymbalta.    Reflux: Continue pantoprazole.    Steroid-induced diabetes: Blood sugars are adequately controlled with current insulin regimen.    Hypothyroidism: Continue levothyroxine.    Multiple acute/subacute ischemic strokes: etiology likely embolic vs perioperative. MRI brain with infarcts noted in both cerebral hemispheres, left cerebellum, presumed associated with new Afib vs perioperative. Bilateral upper extremity paresis (R>L), suspicion for some cortical blindness. Continue warfarin, HTN and BS control and rosuvastatin.   Bone health: Continue Reclast per endocrinology.    CKD: Patient has a history of stage IIIa CKD.  Creatinine has normalized.  Monitoring.    Healthcare maintenance: The patient has received his influenza vaccine for this flu season.  COVID booster will be pursued in 4 to 6 weeks, when he is fully recovered from the current COVID infection.    The patient will follow-up in 3 months with annual studies.    Abiodun GERARD, have spent 60 minutes on the day of the visit to review the chart, interview and examine the patient, review labs and imaging,  formulate a plan, document and submit orders. Time documented is excluding time spent for PFT interpretation.   Abiodun Woods MD     Lung TX HPI  Transplants:  10/16/2021 (Lung), Postoperative day:  759    The patient was seen and examined by Abiodun Woods MD   Edson Thornton is a 58 year old male with NSIP/ILD, bronchiectasis, moderate PH, RA, SALTY, chronic HSV infection, hypogammaglobulinemia, steroid-induced diabetes, hypothyroidism, PFO, HTN, HLD, duodenal anomaly, anxiety, and depression. Admitted on 9/5/21 from OSH for acute on chronic respiratory failure 2/2 ILD exacerbation now s/p BSLT on 10/16/21. Prolonged vent wean s/p trach and PEG/J tube.  Post-op course also complicated by encephalopathy and diffuse weakness, acute to subacute CVA, afib with RVR, BRENNAN, GI bleed, Candidemia/Candida empyema, and anxiety. Code called 11/2 for bradycardia/asystole, progressive hypotension, found to have significant GI bleed, EGD with adherent clot near PEG tube site that was clipped.  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 His exercise tolerance and PFTs have not reached the level expected.     The patient was hospitalized in July for community-acquired pneumonia.  He also required a course of levofloxacin in early September and again in late September for respiratory infections.    On 11/1, the patient developed chest congestion, cough and wheeze.  He was found to be COVID-positive.  He was treated with remdesivir.  Plaquenil was held due to the interaction between Plaquenil and remdesivir.    Breathing is comfortable at rest.  Dyspnea with moderate exertion, increased with COVID, gradually improving but not to baseline.  No chest pain.  Increased cough with COVID, improving but not resolved.  Initially the patient had green sputum now yellow and decreasing in  volume.  No fever, chills or night sweats.  No chest pain.    Review of systems:  Appetite is good  Clear rhinorrhea  No nausea, vomiting, diarrhea or abdominal pain  Palpitations  Due for dermatology visit this month.  Hand stiffness, similar to baseline  Easy bruising  A complete ROS was otherwise negative except as noted in the HPI.    Current Outpatient Medications   Medication    amLODIPine (NORVASC) 10 MG tablet    azelastine (ASTELIN) 0.1 % nasal spray    Calcium Carbonate-Vitamin D (CALCIUM 600 +D HIGH POTENCY) 600-10 MG-MCG TABS    diclofenac (VOLTAREN) 1 % topical gel    famotidine (PEPCID) 20 MG tablet    ferrous sulfate (FEROSUL) 325 (65 Fe) MG tablet    FLUoxetine (PROZAC) 20 MG capsule    fluticasone-vilanterol (BREO ELLIPTA) 200-25 MCG/ACT inhaler    hydroxychloroquine (PLAQUENIL) 200 MG tablet    insulin aspart (NOVOLOG PEN) 100 UNIT/ML pen    lamoTRIgine (LAMICTAL) 25 MG tablet    levalbuterol (XOPENEX HFA) 45 MCG/ACT inhaler    levothyroxine (SYNTHROID/LEVOTHROID) 25 MCG tablet    Magnesium Glycinate 100 MG CAPS    Melatonin 10 MG TABS tablet    mirabegron (MYRBETRIQ) 50 MG 24 hr tablet    mirtazapine (REMERON) 7.5 MG tablet    multivitamin w/minerals (THERA-VIT-M) tablet    MYFORTIC (BRAND) 360 MG EC tablet    ondansetron (ZOFRAN-ODT) 4 MG ODT tab    pantoprazole (PROTONIX) 40 MG EC tablet    predniSONE (DELTASONE) 2.5 MG tablet    predniSONE (DELTASONE) 5 MG tablet    propranolol (INDERAL) 20 MG tablet    rOPINIRole (REQUIP) 0.25 MG tablet    rosuvastatin (CRESTOR) 10 MG tablet    senna-docusate (SENOKOT-S/PERICOLACE) 8.6-50 MG tablet    sulfamethoxazole-trimethoprim (BACTRIM) 400-80 MG tablet    tacrolimus (GENERIC) 1 MG capsule    tamsulosin (FLOMAX) 0.4 MG capsule    voriconazole (VFEND) 200 MG tablet    voriconazole (VFEND) 50 MG tablet    warfarin ANTICOAGULANT (COUMADIN) 1 MG tablet    acetaminophen (TYLENOL) 325 MG tablet    insulin pen needle (32G X 4 MM) 32G X 4 MM miscellaneous     tacrolimus (GENERIC) 1 mg/mL suspension     No current facility-administered medications for this visit.     No Known Allergies  Past Medical History:   Diagnosis Date    BRENNAN (acute kidney injury) (H24) 10/17/2021    Anxiety     Depression     HLD (hyperlipidemia)     HTN (hypertension)     Hypothyroidism     ILD (interstitial lung disease) (H)     Infection due to 2019 novel coronavirus 03/2023    SALTY on CPAP     Oxygen dependent     BL 4L since ~6/2021    Primary hypertension 02/28/2022    Rheumatoid arthritis (H)     signs ~5/2020, dx 5/2021    S/P lung transplant (H) 10/16/2021    Shock liver 10/17/2021    Steroid-induced hyperglycemia     Traction bronchiectasis (H)        Past Surgical History:   Procedure Laterality Date    BRONCHOSCOPY (RIGID OR FLEXIBLE), DIAGNOSTIC N/A 1/26/2022    Procedure: BRONCHOSCOPY, WITH BRONCHOALVEOLAR LAVAGE AND BIOPSY;  Surgeon: Perlman, David Morris, MD;  Location:  GI    BRONCHOSCOPY (RIGID OR FLEXIBLE), DIAGNOSTIC N/A 4/19/2022    Procedure: BRONCHOSCOPY, WITH BRONCHOALVEOLAR LAVAGE AND BIOPSY;  Surgeon: Sherine Merrill MD;  Location:  GI    BRONCHOSCOPY (RIGID OR FLEXIBLE), DIAGNOSTIC N/A 6/21/2022    Procedure: BRONCHOSCOPY, WITH BRONCHOALVEOLAR LAVAGE AND BIOPSY;  Surgeon: Aneesh Rubio MD;  Location:  GI    COLONOSCOPY W/ BIOPSIES AND POLYPECTOMY  07/21/2020    CV CORONARY ANGIOGRAM N/A 09/08/2021    Procedure: Coronary Angiogram with possible intervention;  Surgeon: Jovon Bullock MD;  Location:  HEART CARDIAC CATH LAB    CV RIGHT HEART CATH MEASUREMENTS RECORDED N/A 09/08/2021    Procedure: Right Heart Cath;  Surgeon: Jovon Bullock MD;  Location:  HEART CARDIAC CATH LAB    ESOPHAGOSCOPY, GASTROSCOPY, DUODENOSCOPY (EGD), COMBINED N/A 10/23/2021    Procedure: ESOPHAGOGASTRODUODENOSCOPY (EGD);  Surgeon: Miquel Pisano MD;  Location:  GI    ESOPHAGOSCOPY, GASTROSCOPY, DUODENOSCOPY (EGD), COMBINED N/A 11/02/2021    Procedure:  ESOPHAGOGASTRODUODENOSCOPY (EGD);  Surgeon: Daniel Ortiz MD;  Location: [facility]    ESOPHAGOSCOPY, GASTROSCOPY, DUODENOSCOPY (EGD), COMBINED N/A 11/5/2021    Procedure: ESOPHAGOGASTRODUODENOSCOPY (EGD);  Surgeon: Ronnell Hernandez MD;  Location: [facility]    EXTRACTION(S) DENTAL N/A 09/22/2021    Procedure: EXTRACTION tooth #19;  Surgeon: Deepak Tobin DDS;  Location: [facility]    HERNIA REPAIR      IR CHEST TUBE PLACEMENT NON-TUNNELLED LEFT  11/03/2021    PICC TRIPLE LUMEN PLACEMENT Left 11/04/2021    5FR TL PICC. Right non occlusive thrombus subclavian vein.    REPLACE GASTROJEJUNOSTOMY TUBE, PERCUTANEOUS N/A 11/9/2021    Procedure: REPLACEMENT, GASTROJEJUNOSTOMY TUBE, PERCUTANEOUS;  Surgeon: Hernando Rodriguez MD;  Location: [facility]    right acl      TRACHEOSTOMY PERCUTANEOUS N/A 10/29/2021    Procedure: Percutaneous Tracheostomy,;  Surgeon: Celine Jenkins MD;  Location: [facility]    TRANSPLANT LUNG RECIPIENT SINGLE X2 Bilateral 10/16/2021    Procedure: TRANSPLANT, LUNG, RECIPIENT, BILATERAL, Bronchoscopy, on-pump perfusion, bilateral clamshell sternotomy;  Surgeon: Yanick Corral MD;  Location: [facility]    XR ACROMIOCLAVICULAR JOINT BILATERAL         Social History     Socioeconomic History    Marital status: Single     Spouse name: Not on file    Number of children: Not on file    Years of education: Not on file    Highest education level: Not on file   Occupational History    Not on file   Tobacco Use    Smoking status: Never    Smokeless tobacco: Never   Substance and Sexual Activity    Alcohol use: Never    Drug use: Never    Sexual activity: Not on file   Other Topics Concern    Parent/sibling w/ CABG, MI or angioplasty before 65F 55M? Not Asked   Social History Narrative    Full time  for the [employer] starting May 24, 2023.     Social Determinants of Health     Financial Resource Strain: Not on file   Food Insecurity: Not on file   Transportation Needs: Not on file    Physical Activity: Not on file   Stress: Not on file   Social Connections: Not on file   Interpersonal Safety: Not on file   Housing Stability: Not on file         BP (!) 145/84 (BP Location: Right arm, Patient Position: Sitting, Cuff Size: Adult Regular)   Pulse 66   Wt 65.8 kg (145 lb)   SpO2 96%   BMI 24.50 kg/m    Body mass index is 24.5 kg/m .  Exam:   GENERAL APPEARANCE: Well developed, well nourished, alert, and in no apparent distress.  EYES: PERRL, EOMI  EARS: Canals clear, TMs normal  MOUTH: Oral mucosa is moist, without any lesions, no tonsillar enlargement, no oropharyngeal exudate.  NECK: supple, no masses, no thyromegaly.  LYMPHATICS: No significant axillary, cervical, or supraclavicular nodes.  RESP: normal percussion, good air flow throughout.  No crackles. No rhonchi. Diffuse coarse wheezes.  CV: Normal S1, S2, regular rhythm, normal rate. No murmur.  No rub. No gallop. No LE edema.   ABDOMEN:  Bowel sounds normal, soft, nontender, no HSM or masses.   MS: extremities normal. No clubbing. No cyanosis.  SKIN: no rash on limited exam  NEURO: Mentation intact, speech normal, normal strength and tone, normal gait and stance  PSYCH: mentation appears normal. and affect normal/bright  Results:  Recent Results (from the past 168 hour(s))   INR    Collection Time: 11/14/23  9:03 AM   Result Value Ref Range    INR 3.81 (H) 0.85 - 1.15   Magnesium    Collection Time: 11/14/23  9:03 AM   Result Value Ref Range    Magnesium 1.5 (L) 1.7 - 2.3 mg/dL   CBC with platelets    Collection Time: 11/14/23  9:03 AM   Result Value Ref Range    WBC Count 8.3 4.0 - 11.0 10e3/uL    RBC Count 4.77 4.40 - 5.90 10e6/uL    Hemoglobin 13.4 13.3 - 17.7 g/dL    Hematocrit 41.5 40.0 - 53.0 %    MCV 87 78 - 100 fL    MCH 28.1 26.5 - 33.0 pg    MCHC 32.3 31.5 - 36.5 g/dL    RDW 14.6 10.0 - 15.0 %    Platelet Count 164 150 - 450 10e3/uL   General PFT Lab (Please always keep checked)    Collection Time: 11/14/23  9:05 AM   Result  Value Ref Range    FVC-Pred 3.55 L    FVC-Pre 2.39 L    FVC-%Pred-Pre 67 %    FEV1-Pre 1.45 L    FEV1-%Pred-Pre 51 %    FEV1FVC-Pred 80 %    FEV1FVC-Pre 61 %    FEFMax-Pred 8.14 L/sec    FEFMax-Pre 4.34 L/sec    FEFMax-%Pred-Pre 53 %    FEF2575-Pred 2.54 L/sec    FEF2575-Pre 0.69 L/sec    LFB5485-%Pred-Pre 27 %    ExpTime-Pre 12.06 sec    FIFMax-Pre 3.67 L/sec    FEV1FEV6-Pred 79 %    FEV1FEV6-Pre 62 %         Results as noted above.    PFT Interpretation:  Moderate obstructive ventilatory defect.  Decreased from previous.  Below recent best.   Valid Maneuver

## 2023-11-14 NOTE — PATIENT INSTRUCTIONS
Patient Instructions  1. Continue to hydrate with 60-70 oz fluids daily.  2. Continue to exercise daily or most days of the week.  3. Follow up with your primary care provider for annual gender health maintenance procedures.  4. Follow up with colonoscopy schedule.  5. Follow up with annual dermatology visits.  6. It doesn't seem like the COVID vaccine is working well in lung transplant patients. A number of lung transplant patients have gotten sick with COVID even after receiving the vaccines. Based on our recent experience, it can be life-threatening to get COVID  even after being vaccinated. Please continue to act like you did not get the COVID vaccine - social distancing, wearing a mask, good hand hygiene, etc. If the people around you are vaccinated, it will help reduce the risk of you getting COVID. All members of your household should be vaccinated.  7. Restart Plaquenil in 5 days.    8. Increase Amlodipine to 10 mg daily (at bedtime) .   9. Wait a couple months to get your COVID vaccine.     Next transplant clinic appointment: 3 months with CXR, labs 6 minute walk test, and full PFTs  Next lab draw: pending tacrolimus level, otherwise monthly.       You are scheduled for a bronchoscopy on 11/15 at 9AM at the [facility] Suite on the 3rd floor. Arrive 1 hour early (8AM)     Instructions     1. Nothing to eat or drink 8 hours before procedure.  2. Hold your morning medications. Bring them with you to take after the procedure.  3. Have a  available to take you home.     ~~~~~~~~~~~~~~~~~~~~~~~~~    Thoracic Transplant Office phone [phone], fax [phone]    Office Hours 8:30 - 5:00     For after-hours urgent issues, please dial [phone] and ask the  to page the Thoracic Transplant Coordinator On-Call.   --------------------  To expedite your medication refill(s), please contact your pharmacy and have them fax a refill request to: [phone]    *Please  allow 3 business days for routine medication refills.  *Please allow 5 business days for controlled substance medication refills.    **For Diabetic medications and supplies refill(s), please contact your pharmacy and have them contact your Endocrine team.  --------------------  For scheduling appointments call [phone].  --------------------  Please Note: If you are active on [employer], all future test results will be sent by [employer] message only, and will no longer be called to patient. You may also receive communication directly from your physician.

## 2023-11-14 NOTE — PROGRESS NOTES
Surveillance Bronchoscopy with airway check  Date of transplant 10/16/21   Transplant type Bilateral Lung  Date of labs 11/14/23  BUN 18.9 DDAVP no  Plt 164  INR 3.81 Anticoag therapy Coumadin Last dose today 11/14/23  Comment NO BIOPSIES Surveillance with airway check only     Any questions please page Dr. Woods [phone]

## 2023-11-14 NOTE — NURSING NOTE
Chief Complaint   Patient presents with    RECHECK     3 month follow up     BP (!) 145/84 (BP Location: Right arm, Patient Position: Sitting, Cuff Size: Adult Regular)   Pulse 66   Wt 65.8 kg (145 lb)   SpO2 96%   BMI 24.50 kg/m    Rohda HOLLAND

## 2023-11-14 NOTE — PROGRESS NOTES
ANTICOAGULATION MANAGEMENT     Edson Thornton Jr. 58 year old male is on warfarin with supratherapeutic INR result. (Goal INR 2.0-3.0)    Recent labs: (last 7 days)     11/14/23  0903   INR 3.81*       ASSESSMENT     Source(s): Chart Review and Patient/Caregiver Call     Warfarin doses taken: Warfarin taken as instructed  Diet: No new diet changes identified  Medication/supplement changes:  finished 5 remdesivir infusions for COVID infection last week. This can increase INR  New illness, injury, or hospitalization: No  Signs or symptoms of bleeding or clotting: No  Previous result: Subtherapeutic  Additional findings:  Bret reports very few symptoms with his recent COVID infection. Given that INRs have been globally stable on this maintenance dose except for the 2 most recent INRs (during and shortly post remdesivir infusions) will try a correction dose today and resume current maintenance.       PLAN     Recommended plan for temporary change(s) affecting INR     Dosing Instructions: partial hold then continue your current warfarin dose with next INR in 7-10 days       Summary  As of 11/14/2023      Full warfarin instructions:  11/14: 1 mg; Otherwise 2 mg every Mon, Wed, Fri; 3 mg all other days   Next INR check:  11/24/2023               Telephone call with Bret who verbalizes understanding and agrees to plan    Patient using outside facility for labs    Education provided:   Interaction IS anticipated between warfarin and remdesivir  Contact [phone] with any changes, questions or concerns.     Plan made per [facility] anticoagulation protocol    Annabel Leiva RN  Anticoagulation Clinic  11/14/2023    _______________________________________________________________________     Anticoagulation Episode Summary       Current INR goal:  2.0-3.0   TTR:  80.6% (11.7 mo)   Target end date:  Indefinite   Send INR reminders to:  [facility]    Indications    Atrial fibrillation  unspecified type (H)  [I48.91]  Encounter for long-term (current) use of high-risk medication [Z79.899]             Comments:               Anticoagulation Care Providers       Provider Role Specialty Phone number    Abiodun Woods MD Referring Pulmonary Disease [phone]

## 2023-11-15 ENCOUNTER — HOSPITAL ENCOUNTER (OUTPATIENT)
Facility: CLINIC | Age: 58
Discharge: HOME | End: 2023-11-15
Attending: INTERNAL MEDICINE | Admitting: INTERNAL MEDICINE
Payer: COMMERCIAL

## 2023-11-15 ENCOUNTER — TELEPHONE (OUTPATIENT)
Dept: ANTICOAGULATION | Facility: CLINIC | Age: 58
End: 2023-11-15

## 2023-11-15 ENCOUNTER — MYC MEDICAL ADVICE (OUTPATIENT)
Dept: TRANSPLANT | Facility: CLINIC | Age: 58
End: 2023-11-15
Payer: COMMERCIAL

## 2023-11-15 VITALS
SYSTOLIC BLOOD PRESSURE: 113 MMHG | HEART RATE: 64 BPM | DIASTOLIC BLOOD PRESSURE: 82 MMHG | RESPIRATION RATE: 16 BRPM | OXYGEN SATURATION: 94 %

## 2023-11-15 DIAGNOSIS — Z94.2 LUNG REPLACED BY TRANSPLANT (H): ICD-10-CM

## 2023-11-15 DIAGNOSIS — E83.42 HYPOMAGNESEMIA: ICD-10-CM

## 2023-11-15 LAB
1,3 BETA GLUCAN SER-MCNC: 413 PG/ML
BRONCHOSCOPY: NORMAL
EBV DNA # SPEC NAA+PROBE: NOT DETECTED COPIES/ML
GRAM STAIN RESULT: NORMAL
GRAM STAIN RESULT: NORMAL
OBSERVATION IMP: POSITIVE

## 2023-11-15 PROCEDURE — 31622 DX BRONCHOSCOPE/WASH: CPT | Performed by: INTERNAL MEDICINE

## 2023-11-15 PROCEDURE — G0500 MOD SEDAT ENDO SERVICE >5YRS: HCPCS | Performed by: INTERNAL MEDICINE

## 2023-11-15 PROCEDURE — 250N000009 HC RX 250: Performed by: INTERNAL MEDICINE

## 2023-11-15 PROCEDURE — 99152 MOD SED SAME PHYS/QHP 5/>YRS: CPT | Performed by: INTERNAL MEDICINE

## 2023-11-15 PROCEDURE — 87102 FUNGUS ISOLATION CULTURE: CPT | Performed by: INTERNAL MEDICINE

## 2023-11-15 PROCEDURE — 87205 SMEAR GRAM STAIN: CPT | Performed by: INTERNAL MEDICINE

## 2023-11-15 PROCEDURE — 250N000011 HC RX IP 250 OP 636: Performed by: INTERNAL MEDICINE

## 2023-11-15 PROCEDURE — 87070 CULTURE OTHR SPECIMN AEROBIC: CPT | Performed by: INTERNAL MEDICINE

## 2023-11-15 RX ORDER — LIDOCAINE 40 MG/G
CREAM TOPICAL
Status: DISCONTINUED | OUTPATIENT
Start: 2023-11-15 | End: 2023-11-15 | Stop reason: HOSPADM

## 2023-11-15 RX ORDER — LIDOCAINE HYDROCHLORIDE 40 MG/ML
INJECTION, SOLUTION RETROBULBAR PRN
Status: DISCONTINUED | OUTPATIENT
Start: 2023-11-15 | End: 2023-11-15 | Stop reason: HOSPADM

## 2023-11-15 RX ORDER — FENTANYL CITRATE 50 UG/ML
INJECTION, SOLUTION INTRAMUSCULAR; INTRAVENOUS PRN
Status: DISCONTINUED | OUTPATIENT
Start: 2023-11-15 | End: 2023-11-15 | Stop reason: HOSPADM

## 2023-11-15 RX ORDER — MAGNESIUM GLYCINATE 100 MG
400 CAPSULE ORAL 3 TIMES DAILY
Start: 2023-11-15

## 2023-11-15 ASSESSMENT — ACTIVITIES OF DAILY LIVING (ADL): ADLS_ACUITY_SCORE: 37

## 2023-11-15 NOTE — TELEPHONE ENCOUNTER
ANTICOAGULATION  MANAGEMENT: Discharge Review    Edson Thornton Jr. chart reviewed for anticoagulation continuity of care    Outpatient surgery/procedure on 11/15/23 for surveillance bronch.    Discharge disposition: Home    Results:    Recent labs: (last 7 days)     11/14/23  0903   INR 3.81*     Anticoagulation inpatient management:     not applicable     Anticoagulation discharge instructions:     Warfarin dosing: home regimen continued   Bridging: No   INR goal change: No      Medication changes affecting anticoagulation: No    Additional factors affecting anticoagulation: No     PLAN     No adjustment to anticoagulation plan needed    Patient not contacted    No adjustment to Anticoagulation Calendar was required    Terrie Cedillo RN

## 2023-11-15 NOTE — OR NURSING
Procedure: bronch with washing   Sedation: conscious sedation   Specimens: sent to lab.   O2: 2L  Tolerated procedure: well  Pt to recovery area in stable condition accompanied by RN.   Other:  none

## 2023-11-15 NOTE — DISCHARGE INSTRUCTIONS
Discharge Instructions after Bronchoscopy    Activity  You had medicine to relax and for pain. You may feel dizzy or sleepy.  For 24 hours:   Do not drive or use heavy equipment.   Do not make important decisions.   Do not drink any alcohol.    Diet  When you can swallow easily, you may go back to your regular diet, medicines  and light exercise.    Follow-up  We took fluid samples to study. We will call you with the results in about 10 business days.    Call right away if you have:   Unusual chest pain   Temperature above 100.6  F (37.5  C)   Coughing that does not stop.    If you have severe pain, bleeding, or shortness of breath, go to an emergency room.    If you have questions, call:  Monday to Friday, 8 a.m. to 4:30 p.m.  [facility]ult [facility]: [phone]    After hours:  [facility]: [phone] (Ask for the pulmonary fellow on call)

## 2023-11-15 NOTE — PROVIDER NOTIFICATION
Notified provider that pt has IgG infusion after Bronchoscopy today and has requested to leave IV in place for infusion. Dr. Daniels agreed to leave IV in place upon discharge from procedure but was unaware of specific order set to be added to pt chart.

## 2023-11-16 ENCOUNTER — TELEPHONE (OUTPATIENT)
Dept: TRANSPLANT | Facility: CLINIC | Age: 58
End: 2023-11-16

## 2023-11-16 ENCOUNTER — PRE VISIT (OUTPATIENT)
Dept: OTOLARYNGOLOGY | Facility: CLINIC | Age: 58
End: 2023-11-16

## 2023-11-16 ENCOUNTER — OFFICE VISIT (OUTPATIENT)
Dept: AUDIOLOGY | Facility: CLINIC | Age: 58
End: 2023-11-16
Attending: PHYSICIAN ASSISTANT
Payer: COMMERCIAL

## 2023-11-16 ENCOUNTER — OFFICE VISIT (OUTPATIENT)
Dept: OTOLARYNGOLOGY | Facility: CLINIC | Age: 58
End: 2023-11-16
Payer: COMMERCIAL

## 2023-11-16 VITALS
WEIGHT: 146 LBS | DIASTOLIC BLOOD PRESSURE: 86 MMHG | HEIGHT: 64 IN | OXYGEN SATURATION: 95 % | TEMPERATURE: 98.1 F | BODY MASS INDEX: 24.92 KG/M2 | SYSTOLIC BLOOD PRESSURE: 158 MMHG | HEART RATE: 63 BPM

## 2023-11-16 DIAGNOSIS — B44.9 ASPERGILLUS (H): ICD-10-CM

## 2023-11-16 DIAGNOSIS — H93.8X3 SENSATION OF PLUGGED EAR ON BOTH SIDES: ICD-10-CM

## 2023-11-16 DIAGNOSIS — Z94.2 LUNG REPLACED BY TRANSPLANT (H): Primary | ICD-10-CM

## 2023-11-16 DIAGNOSIS — H90.3 SENSORINEURAL HEARING LOSS (SNHL) OF BOTH EARS: Primary | ICD-10-CM

## 2023-11-16 DIAGNOSIS — D84.9 IMMUNOSUPPRESSED STATUS (H): ICD-10-CM

## 2023-11-16 DIAGNOSIS — H90.3 SENSORINEURAL HEARING LOSS (SNHL), BILATERAL: Primary | ICD-10-CM

## 2023-11-16 DIAGNOSIS — H92.21 BLOOD IN EAR CANAL, RIGHT: ICD-10-CM

## 2023-11-16 LAB
GALACTOMANNAN AG SERPL QL IA: NEGATIVE
GALACTOMANNAN AG SPEC IA-ACNC: 0.14
IMMUKNOW IMMUNE CELL FUNCTION: 624 NG/ML

## 2023-11-16 PROCEDURE — 99203 OFFICE O/P NEW LOW 30 MIN: CPT | Performed by: PHYSICIAN ASSISTANT

## 2023-11-16 PROCEDURE — 92550 TYMPANOMETRY & REFLEX THRESH: CPT | Performed by: AUDIOLOGIST

## 2023-11-16 PROCEDURE — 92557 COMPREHENSIVE HEARING TEST: CPT | Performed by: AUDIOLOGIST

## 2023-11-16 ASSESSMENT — PAIN SCALES - GENERAL: PAINLEVEL: NO PAIN (0)

## 2023-11-16 NOTE — PROGRESS NOTES
AUDIOLOGY REPORT    SUMMARY: Audiology visit completed. See audiogram for results.      RECOMMENDATIONS: Follow-up with ENT.    Mikey Kay, CCC-A  Clinical Audiologist  [place] #[id number]

## 2023-11-16 NOTE — PROGRESS NOTES
Otolaryngology Clinic  November 16, 2023    Chief Complaint:   Plugged ears       History of Present Illness:   Edson Thornton Jr. is a 58 year old male who has a history of bilateral lung transplant who presents today for plugged sensation in both ears.  He reports feeling like his ears are plugged, his hearing is muffled, and his voice is loud in his head for the last 6 to 8 months.  He denies any crackly ears and has been popping his ears.  After popping his ears this is when he reports his voice sounds amplified in his head.  He has seen 3 ENTs in the last year for this issue.  He has tried Flonase, which did not help, and was told he had nerve damage.  He denies any other ENT history and has not tried any other treatment for this issue.  He endorses reflux for which she takes Protonix and another medication.  He does grind his teeth but has not been diagnosed with TMJ.  He has chronic tinnitus and has been wearing bilateral amplification for the last year.  He also endorses congestion and postnasal drip but denies any pain or pressure in his face and denies frequent sinus infections.      Past Medical History:  Past Medical History:   Diagnosis Date    BRENNAN (acute kidney injury) (H24) 10/17/2021    Anxiety     Depression     HLD (hyperlipidemia)     HTN (hypertension)     Hypothyroidism     ILD (interstitial lung disease) (H)     Infection due to 2019 novel coronavirus 03/2023    SALTY on CPAP     Oxygen dependent     BL 4L since ~6/2021    Primary hypertension 02/28/2022    Rheumatoid arthritis (H)     signs ~5/2020, dx 5/2021    S/P lung transplant (H) 10/16/2021    Shock liver 10/17/2021    Steroid-induced hyperglycemia     Traction bronchiectasis (H)        Past Surgical History:  Past Surgical History:   Procedure Laterality Date    BRONCHOSCOPY (RIGID OR FLEXIBLE), DIAGNOSTIC N/A 1/26/2022    Procedure: BRONCHOSCOPY, WITH BRONCHOALVEOLAR LAVAGE AND BIOPSY;  Surgeon: Perlman, David Morris, MD;  Location:  [facility]    BRONCHOSCOPY (RIGID OR FLEXIBLE), DIAGNOSTIC N/A 4/19/2022    Procedure: BRONCHOSCOPY, WITH BRONCHOALVEOLAR LAVAGE AND BIOPSY;  Surgeon: Sherine Merrill MD;  Location:  GI    BRONCHOSCOPY (RIGID OR FLEXIBLE), DIAGNOSTIC N/A 6/21/2022    Procedure: BRONCHOSCOPY, WITH BRONCHOALVEOLAR LAVAGE AND BIOPSY;  Surgeon: Aneesh Rubio MD;  Location:  GI    BRONCHOSCOPY FLEXIBLE AND RIGID N/A 11/15/2023    Procedure: Surveillance Bronchoscopy with airway check;  Surgeon: Ron Daniels MD;  Location:  GI    COLONOSCOPY W/ BIOPSIES AND POLYPECTOMY  07/21/2020    CV CORONARY ANGIOGRAM N/A 09/08/2021    Procedure: Coronary Angiogram with possible intervention;  Surgeon: Jovon Bullock MD;  Location:  HEART CARDIAC CATH LAB    CV RIGHT HEART CATH MEASUREMENTS RECORDED N/A 09/08/2021    Procedure: Right Heart Cath;  Surgeon: Jovon Bullock MD;  Location:  HEART CARDIAC CATH LAB    ESOPHAGOSCOPY, GASTROSCOPY, DUODENOSCOPY (EGD), COMBINED N/A 10/23/2021    Procedure: ESOPHAGOGASTRODUODENOSCOPY (EGD);  Surgeon: Miquel Pisano MD;  Location:  GI    ESOPHAGOSCOPY, GASTROSCOPY, DUODENOSCOPY (EGD), COMBINED N/A 11/02/2021    Procedure: ESOPHAGOGASTRODUODENOSCOPY (EGD);  Surgeon: Daniel Ortiz MD;  Location:  GI    ESOPHAGOSCOPY, GASTROSCOPY, DUODENOSCOPY (EGD), COMBINED N/A 11/5/2021    Procedure: ESOPHAGOGASTRODUODENOSCOPY (EGD);  Surgeon: Ronnell Hernandez MD;  Location:  GI    EXTRACTION(S) DENTAL N/A 09/22/2021    Procedure: EXTRACTION tooth #19;  Surgeon: Deepak Tobin DDS;  Location:  OR    HERNIA REPAIR      IR CHEST TUBE PLACEMENT NON-TUNNELLED LEFT  11/03/2021    PICC TRIPLE LUMEN PLACEMENT Left 11/04/2021    5FR TL PICC. Right non occlusive thrombus subclavian vein.    REPLACE GASTROJEJUNOSTOMY TUBE, PERCUTANEOUS N/A 11/9/2021    Procedure: REPLACEMENT, GASTROJEJUNOSTOMY TUBE, PERCUTANEOUS;  Surgeon: Hernando Rodriguez MD;  Location: [facility]    right acl       TRACHEOSTOMY PERCUTANEOUS N/A 10/29/2021    Procedure: Percutaneous Tracheostomy,;  Surgeon: Celine Jenkins MD;  Location: [facility]    TRANSPLANT LUNG RECIPIENT SINGLE X2 Bilateral 10/16/2021    Procedure: TRANSPLANT, LUNG, RECIPIENT, BILATERAL, Bronchoscopy, on-pump perfusion, bilateral clamshell sternotomy;  Surgeon: Yanick Corral MD;  Location: [facility]    XR ACROMIOCLAVICULAR JOINT BILATERAL         Medications:  Current Outpatient Medications   Medication Sig Dispense Refill    acetaminophen (TYLENOL) 325 MG tablet 3 tablets (975 mg) by Oral or Feeding Tube route every 8 hours as needed for mild pain 100 tablet 11    amLODIPine (NORVASC) 10 MG tablet Take 1 tablet (10 mg) by mouth at bedtime 30 tablet 11    azelastine (ASTELIN) 0.1 % nasal spray Spray 2 sprays in nostril 2 times daily      Calcium Carbonate-Vitamin D (CALCIUM 600 +D HIGH POTENCY) 600-10 MG-MCG TABS 1 tablet by Oral or Feeding Tube route daily 30 tablet 11    diclofenac (VOLTAREN) 1 % topical gel Apply 4 g topically 4 times daily Both hands 150 g 11    famotidine (PEPCID) 20 MG tablet Take 20 mg by mouth daily      ferrous sulfate (FEROSUL) 325 (65 Fe) MG tablet Take 1 tablet (325 mg) by mouth daily (with breakfast) 90 tablet 3    FLUoxetine (PROZAC) 20 MG capsule Take 20 mg by mouth daily      fluticasone-vilanterol (BREO ELLIPTA) 200-25 MCG/ACT inhaler Inhale 1 puff into the lungs daily 28 each 11    hydroxychloroquine (PLAQUENIL) 200 MG tablet Take 1 tablet (200 mg) by mouth 2 times daily 180 tablet 3    insulin aspart (NOVOLOG PEN) 100 UNIT/ML pen Take 1-3 units TID if BS over 200. Max daily dose is 12 15 mL 11    insulin pen needle (32G X 4 MM) 32G X 4 MM miscellaneous Use 4 pen needles daily or as directed. 120 each 11    lamoTRIgine (LAMICTAL) 25 MG tablet Take 50 mg by mouth daily      levalbuterol (XOPENEX HFA) 45 MCG/ACT inhaler INHALE 2 PUFFS BY MOUTH EVERY 4 HOURS AS NEEDED FOR WHEEZING FOR SHORTNESS OF BREATH 15 g 0     levothyroxine (SYNTHROID/LEVOTHROID) 25 MCG tablet Take 1 tablet (25 mcg) by mouth daily 30 tablet 11    magnesium glycinate 100 MG CAPS capsule Take 4 capsules (400 mg) by mouth 3 times daily      Melatonin 10 MG TABS tablet Take 1 tablet (10 mg) by mouth nightly as needed for sleep 30 tablet 3    mirabegron (MYRBETRIQ) 50 MG 24 hr tablet Take 50 mg by mouth daily      mirtazapine (REMERON) 7.5 MG tablet Take 7.5 mg by mouth At Bedtime      multivitamin w/minerals (THERA-VIT-M) tablet Take 1 tablet by mouth daily 30 tablet 11    MYFORTIC (BRAND) 360 MG EC tablet Take 1 tablet (360 mg) by mouth 2 times daily 180 tablet 3    ondansetron (ZOFRAN-ODT) 4 MG ODT tab Take 1 tablet (4 mg) by mouth every 6 hours as needed for nausea 30 tablet 3    pantoprazole (PROTONIX) 40 MG EC tablet Take 1 tablet (40 mg) by mouth 2 times daily (before meals) 60 tablet 11    predniSONE (DELTASONE) 2.5 MG tablet Take 1 tablet (2.5 mg) by mouth every evening Total dose: 5mg in AM and 2.5 mg in PM 30 tablet 11    predniSONE (DELTASONE) 5 MG tablet Take 1 tablet (5 mg) by mouth every morning AND 0.5 tablets (2.5 mg) every evening. 135 tablet 3    propranolol (INDERAL) 20 MG tablet Take 2 tablets (40 mg) by mouth 2 times daily 120 tablet 11    rOPINIRole (REQUIP) 0.25 MG tablet Take 0.25 mg by mouth At Bedtime      rosuvastatin (CRESTOR) 10 MG tablet Take 1 tablet (10 mg) by mouth daily 30 tablet 11    senna-docusate (SENOKOT-S/PERICOLACE) 8.6-50 MG tablet Take 2 tablets by mouth 2 times daily as needed for constipation 120 tablet 11    sulfamethoxazole-trimethoprim (BACTRIM) 400-80 MG tablet Take 1 tablet by mouth daily 30 tablet 11    tacrolimus (GENERIC) 1 MG capsule Take 1 capsule (1 mg) by mouth 2 times daily Total dose: 1 mg BID (had capsules at home already)      tacrolimus (GENERIC) 1 mg/mL suspension Hold for dose changes, switched to capsules for now      tamsulosin (FLOMAX) 0.4 MG capsule Take 1 capsule (0.4 mg) by mouth daily    "   voriconazole (VFEND) 200 MG tablet Take 1 tablet (200 mg) by mouth 2 times daily Total dose: 300mg two times daily 180 tablet 3    voriconazole (VFEND) 50 MG tablet Take 2 tablets (100 mg) by mouth 2 times daily Total dose: 300mg two times daily. 360 tablet 3    warfarin ANTICOAGULANT (COUMADIN) 1 MG tablet Take 2 to 3 tablets daily or as directed by the Coumadin clinic 90 tablet 1       Allergies:  No Known Allergies     Social History:  Social History     Tobacco Use    Smoking status: Never    Smokeless tobacco: Never   Substance Use Topics    Alcohol use: Never    Drug use: Never       ROS: 10 point ROS neg other than the symptoms noted above in the HPI.    Physical Exam:    BP (!) 158/86   Pulse 63   Temp 98.1  F (36.7  C)   Ht 1.626 m (5' 4\")   Wt 66.2 kg (146 lb)   SpO2 95%   BMI 25.06 kg/m       Constitutional:  The patient was accompanied, well-groomed, and in no acute distress.     Skin: Normal:  warm and pink without rash    Neurologic: Alert and oriented x 3.  Voice normal.    Psychiatric: The patient's affect was calm, cooperative, and appropriate.     Communication:  Normal; communicates verbally, normal voice quality.    Respiratory: Breathing comfortably without stridor or exertion of accessory muscles.    Head/Face:  Normocephalic and atraumatic.  No lesions or scars.    Eyes: Wearing glasses. Extraocular movement intact.   Ears: Pinnae and tragus non-tender.  EAC's and TM's were clear.    Nose: Wearing mask due to immunosuppressive status   Neck: Supple with normal laryngeal and tracheal landmarks. Normal range of motion.        Otologic microscope exam:    Patient's ear pathology required use of the binocular microscope for the purpose of cleaning and improving visualization in order to assure a more accurate diagnostic evaluation.    Right ear was examined under the microscope.  Normal appearing TM, nicely aerated middle ear space.   Left ear was also examined under the microscope.  " Normal appearing TM, nicely aerated middle ear space.         Audiogram: 11/16/2023 - data independently reviewed  Right ear: Normal sloping to moderately severe SNHL   Left ear: Normal sloping to moderately severe SNHL   SRT right: 45 left: 30   WR right: 100% left: 96%   Acoustic Reflexes:   Tympanograms: type A right, type A left      Hearing and word rec stable compared to prior audiogram 3/2022    Assessment and Plan:  1. Sensation of plugged ear on both sides  Patient does not have evidence of effusions on either side. Discussed eustachian tube dysfunction and recommend tight GERD control as well as sudafed and afrin PRN.  2. Hearing loss  Recommend annual hearing test     Patient will follow up as needed    Ayaka Lopez PA-C  Otolaryngology  Head & Neck Surgery  [phone]    Review of external notes as documented elsewhere in note  30 minutes spent by me on the date of the encounter doing chart review, history and exam, documentation and further activities per the note

## 2023-11-16 NOTE — LETTER
11/16/2023       RE: Edson Thornton Jr.  [address]  [place] [address]     Dear Colleague,    Thank you for referring your patient, Edson Thornton Jr., to the [facility] [place] at [facility]. Please see a copy of my visit note below.      Otolaryngology Clinic  November 16, 2023    Chief Complaint:   Plugged ears       History of Present Illness:   Edson Thornton Jr. is a 58 year old male who has a history of bilateral lung transplant who presents today for plugged sensation in both ears.  He reports feeling like his ears are plugged, his hearing is muffled, and his voice is loud in his head for the last 6 to 8 months.  He denies any crackly ears and has been popping his ears.  After popping his ears this is when he reports his voice sounds amplified in his head.  He has seen 3 ENTs in the last year for this issue.  He has tried Flonase, which did not help, and was told he had nerve damage.  He denies any other ENT history and has not tried any other treatment for this issue.  He endorses reflux for which she takes Protonix and another medication.  He does grind his teeth but has not been diagnosed with TMJ.  He has chronic tinnitus and has been wearing bilateral amplification for the last year.  He also endorses congestion and postnasal drip but denies any pain or pressure in his face and denies frequent sinus infections.      Past Medical History:  Past Medical History:   Diagnosis Date    BRENNAN (acute kidney injury) (H24) 10/17/2021    Anxiety     Depression     HLD (hyperlipidemia)     HTN (hypertension)     Hypothyroidism     ILD (interstitial lung disease) (H)     Infection due to 2019 novel coronavirus 03/2023    SALTY on CPAP     Oxygen dependent     BL 4L since ~6/2021    Primary hypertension 02/28/2022    Rheumatoid arthritis (H)     signs ~5/2020, dx 5/2021    S/P lung transplant (H) 10/16/2021    Shock liver  10/17/2021    Steroid-induced hyperglycemia     Traction bronchiectasis (H)        Past Surgical History:  Past Surgical History:   Procedure Laterality Date    BRONCHOSCOPY (RIGID OR FLEXIBLE), DIAGNOSTIC N/A 1/26/2022    Procedure: BRONCHOSCOPY, WITH BRONCHOALVEOLAR LAVAGE AND BIOPSY;  Surgeon: Perlman, David Morris, MD;  Location:  GI    BRONCHOSCOPY (RIGID OR FLEXIBLE), DIAGNOSTIC N/A 4/19/2022    Procedure: BRONCHOSCOPY, WITH BRONCHOALVEOLAR LAVAGE AND BIOPSY;  Surgeon: Sherine Merrill MD;  Location:  GI    BRONCHOSCOPY (RIGID OR FLEXIBLE), DIAGNOSTIC N/A 6/21/2022    Procedure: BRONCHOSCOPY, WITH BRONCHOALVEOLAR LAVAGE AND BIOPSY;  Surgeon: Aneesh Rubio MD;  Location:  GI    BRONCHOSCOPY FLEXIBLE AND RIGID N/A 11/15/2023    Procedure: Surveillance Bronchoscopy with airway check;  Surgeon: Ron Daniels MD;  Location:  GI    COLONOSCOPY W/ BIOPSIES AND POLYPECTOMY  07/21/2020    CV CORONARY ANGIOGRAM N/A 09/08/2021    Procedure: Coronary Angiogram with possible intervention;  Surgeon: Jovon Bullock MD;  Location:  HEART CARDIAC CATH LAB    CV RIGHT HEART CATH MEASUREMENTS RECORDED N/A 09/08/2021    Procedure: Right Heart Cath;  Surgeon: Jovon Bullock MD;  Location:  HEART CARDIAC CATH LAB    ESOPHAGOSCOPY, GASTROSCOPY, DUODENOSCOPY (EGD), COMBINED N/A 10/23/2021    Procedure: ESOPHAGOGASTRODUODENOSCOPY (EGD);  Surgeon: Miquel Pisano MD;  Location:  GI    ESOPHAGOSCOPY, GASTROSCOPY, DUODENOSCOPY (EGD), COMBINED N/A 11/02/2021    Procedure: ESOPHAGOGASTRODUODENOSCOPY (EGD);  Surgeon: Daniel Ortiz MD;  Location:  GI    ESOPHAGOSCOPY, GASTROSCOPY, DUODENOSCOPY (EGD), COMBINED N/A 11/5/2021    Procedure: ESOPHAGOGASTRODUODENOSCOPY (EGD);  Surgeon: Ronnell Hernandez MD;  Location:  GI    EXTRACTION(S) DENTAL N/A 09/22/2021    Procedure: EXTRACTION tooth #19;  Surgeon: Deepak Tobin DDS;  Location:  OR    HERNIA REPAIR      IR CHEST TUBE  PLACEMENT NON-TUNNELLED LEFT  11/03/2021    PICC TRIPLE LUMEN PLACEMENT Left 11/04/2021    5FR TL PICC. Right non occlusive thrombus subclavian vein.    REPLACE GASTROJEJUNOSTOMY TUBE, PERCUTANEOUS N/A 11/9/2021    Procedure: REPLACEMENT, GASTROJEJUNOSTOMY TUBE, PERCUTANEOUS;  Surgeon: Hernando Rodriguez MD;  Location: [facility]    right acl      TRACHEOSTOMY PERCUTANEOUS N/A 10/29/2021    Procedure: Percutaneous Tracheostomy,;  Surgeon: Celine Jenkins MD;  Location: [facility]    TRANSPLANT LUNG RECIPIENT SINGLE X2 Bilateral 10/16/2021    Procedure: TRANSPLANT, LUNG, RECIPIENT, BILATERAL, Bronchoscopy, on-pump perfusion, bilateral clamshell sternotomy;  Surgeon: Yanick Corral MD;  Location: [facility]    XR ACROMIOCLAVICULAR JOINT BILATERAL         Medications:  Current Outpatient Medications   Medication Sig Dispense Refill    acetaminophen (TYLENOL) 325 MG tablet 3 tablets (975 mg) by Oral or Feeding Tube route every 8 hours as needed for mild pain 100 tablet 11    amLODIPine (NORVASC) 10 MG tablet Take 1 tablet (10 mg) by mouth at bedtime 30 tablet 11    azelastine (ASTELIN) 0.1 % nasal spray Spray 2 sprays in nostril 2 times daily      Calcium Carbonate-Vitamin D (CALCIUM 600 +D HIGH POTENCY) 600-10 MG-MCG TABS 1 tablet by Oral or Feeding Tube route daily 30 tablet 11    diclofenac (VOLTAREN) 1 % topical gel Apply 4 g topically 4 times daily Both hands 150 g 11    famotidine (PEPCID) 20 MG tablet Take 20 mg by mouth daily      ferrous sulfate (FEROSUL) 325 (65 Fe) MG tablet Take 1 tablet (325 mg) by mouth daily (with breakfast) 90 tablet 3    FLUoxetine (PROZAC) 20 MG capsule Take 20 mg by mouth daily      fluticasone-vilanterol (BREO ELLIPTA) 200-25 MCG/ACT inhaler Inhale 1 puff into the lungs daily 28 each 11    hydroxychloroquine (PLAQUENIL) 200 MG tablet Take 1 tablet (200 mg) by mouth 2 times daily 180 tablet 3    insulin aspart (NOVOLOG PEN) 100 UNIT/ML pen Take 1-3 units TID if BS over 200. Max daily  dose is 12 15 mL 11    insulin pen needle (32G X 4 MM) 32G X 4 MM miscellaneous Use 4 pen needles daily or as directed. 120 each 11    lamoTRIgine (LAMICTAL) 25 MG tablet Take 50 mg by mouth daily      levalbuterol (XOPENEX HFA) 45 MCG/ACT inhaler INHALE 2 PUFFS BY MOUTH EVERY 4 HOURS AS NEEDED FOR WHEEZING FOR SHORTNESS OF BREATH 15 g 0    levothyroxine (SYNTHROID/LEVOTHROID) 25 MCG tablet Take 1 tablet (25 mcg) by mouth daily 30 tablet 11    magnesium glycinate 100 MG CAPS capsule Take 4 capsules (400 mg) by mouth 3 times daily      Melatonin 10 MG TABS tablet Take 1 tablet (10 mg) by mouth nightly as needed for sleep 30 tablet 3    mirabegron (MYRBETRIQ) 50 MG 24 hr tablet Take 50 mg by mouth daily      mirtazapine (REMERON) 7.5 MG tablet Take 7.5 mg by mouth At Bedtime      multivitamin w/minerals (THERA-VIT-M) tablet Take 1 tablet by mouth daily 30 tablet 11    MYFORTIC (BRAND) 360 MG EC tablet Take 1 tablet (360 mg) by mouth 2 times daily 180 tablet 3    ondansetron (ZOFRAN-ODT) 4 MG ODT tab Take 1 tablet (4 mg) by mouth every 6 hours as needed for nausea 30 tablet 3    pantoprazole (PROTONIX) 40 MG EC tablet Take 1 tablet (40 mg) by mouth 2 times daily (before meals) 60 tablet 11    predniSONE (DELTASONE) 2.5 MG tablet Take 1 tablet (2.5 mg) by mouth every evening Total dose: 5mg in AM and 2.5 mg in PM 30 tablet 11    predniSONE (DELTASONE) 5 MG tablet Take 1 tablet (5 mg) by mouth every morning AND 0.5 tablets (2.5 mg) every evening. 135 tablet 3    propranolol (INDERAL) 20 MG tablet Take 2 tablets (40 mg) by mouth 2 times daily 120 tablet 11    rOPINIRole (REQUIP) 0.25 MG tablet Take 0.25 mg by mouth At Bedtime      rosuvastatin (CRESTOR) 10 MG tablet Take 1 tablet (10 mg) by mouth daily 30 tablet 11    senna-docusate (SENOKOT-S/PERICOLACE) 8.6-50 MG tablet Take 2 tablets by mouth 2 times daily as needed for constipation 120 tablet 11    sulfamethoxazole-trimethoprim (BACTRIM) 400-80 MG tablet Take 1 tablet  "by mouth daily 30 tablet 11    tacrolimus (GENERIC) 1 MG capsule Take 1 capsule (1 mg) by mouth 2 times daily Total dose: 1 mg BID (had capsules at home already)      tacrolimus (GENERIC) 1 mg/mL suspension Hold for dose changes, switched to capsules for now      tamsulosin (FLOMAX) 0.4 MG capsule Take 1 capsule (0.4 mg) by mouth daily      voriconazole (VFEND) 200 MG tablet Take 1 tablet (200 mg) by mouth 2 times daily Total dose: 300mg two times daily 180 tablet 3    voriconazole (VFEND) 50 MG tablet Take 2 tablets (100 mg) by mouth 2 times daily Total dose: 300mg two times daily. 360 tablet 3    warfarin ANTICOAGULANT (COUMADIN) 1 MG tablet Take 2 to 3 tablets daily or as directed by the Coumadin clinic 90 tablet 1       Allergies:  No Known Allergies     Social History:  Social History     Tobacco Use    Smoking status: Never    Smokeless tobacco: Never   Substance Use Topics    Alcohol use: Never    Drug use: Never       ROS: 10 point ROS neg other than the symptoms noted above in the HPI.    Physical Exam:    BP (!) 158/86   Pulse 63   Temp 98.1  F (36.7  C)   Ht 1.626 m (5' 4\")   Wt 66.2 kg (146 lb)   SpO2 95%   BMI 25.06 kg/m       Constitutional:  The patient was accompanied, well-groomed, and in no acute distress.     Skin: Normal:  warm and pink without rash    Neurologic: Alert and oriented x 3.  Voice normal.    Psychiatric: The patient's affect was calm, cooperative, and appropriate.     Communication:  Normal; communicates verbally, normal voice quality.    Respiratory: Breathing comfortably without stridor or exertion of accessory muscles.    Head/Face:  Normocephalic and atraumatic.  No lesions or scars.    Eyes: Wearing glasses. Extraocular movement intact.   Ears: Pinnae and tragus non-tender.  EAC's and TM's were clear.    Nose: Wearing mask due to immunosuppressive status   Neck: Supple with normal laryngeal and tracheal landmarks. Normal range of motion.        Otologic microscope exam:  "   Patient's ear pathology required use of the binocular microscope for the purpose of cleaning and improving visualization in order to assure a more accurate diagnostic evaluation.    Right ear was examined under the microscope.  Normal appearing TM, nicely aerated middle ear space.   Left ear was also examined under the microscope.  Normal appearing TM, nicely aerated middle ear space.         Audiogram: 11/16/2023 - data independently reviewed  Right ear: Normal sloping to moderately severe SNHL   Left ear: Normal sloping to moderately severe SNHL   SRT right: 45 left: 30   WR right: 100% left: 96%   Acoustic Reflexes:   Tympanograms: type A right, type A left      Hearing and word rec stable compared to prior audiogram 3/2022    Assessment and Plan:  1. Sensation of plugged ear on both sides  Patient does not have evidence of effusions on either side. Discussed eustachian tube dysfunction and recommend tight GERD control as well as sudafed and afrin PRN.  2. Hearing loss  Recommend annual hearing test     Patient will follow up as needed    Ayaka Lopez PA-C  Otolaryngology  Head & Neck Surgery  [phone]    Review of external notes as documented elsewhere in note  30 minutes spent by me on the date of the encounter doing chart review, history and exam, documentation and further activities per the note

## 2023-11-16 NOTE — PATIENT INSTRUCTIONS
1. You were seen in the ENT Clinic today by BAILEE Paul.  If you have any questions or concerns after your appointment, please call   - Option 1: ENT Clinic: [phone]   - Option 2: Pooja (Ayaka Lopez's  Nurse): [phone]                     2.   Plan to return to clinic as needed    Pooja Desai LPN  [facility] - Otolaryngology

## 2023-11-16 NOTE — TELEPHONE ENCOUNTER
Provider Call: General  Route to LPN    Reason for call: They reeived orders today  she has questions to review She thought was Lanie     Call back needed? Yes    Return Call Needed  Same as documented in contacts section  When to return call?: Same day: Route [facility]

## 2023-11-16 NOTE — NURSING NOTE
".  Chief Complaint   Patient presents with    Consult     Bilateral plugged ears        Blood pressure (!) 158/86, pulse 63, temperature 98.1  F (36.7  C), height 1.626 m (5' 4\"), weight 66.2 kg (146 lb), SpO2 95%.  Michoacano Matute LPN    "

## 2023-11-17 LAB — BACTERIA SPEC CULT: NORMAL

## 2023-11-21 ENCOUNTER — ANTICOAGULATION THERAPY VISIT (OUTPATIENT)
Dept: ANTICOAGULATION | Facility: CLINIC | Age: 58
End: 2023-11-21
Payer: COMMERCIAL

## 2023-11-21 ENCOUNTER — LAB (OUTPATIENT)
Dept: LAB | Facility: CLINIC | Age: 58
End: 2023-11-21
Payer: COMMERCIAL

## 2023-11-21 DIAGNOSIS — Z94.2 LUNG REPLACED BY TRANSPLANT (H): Primary | ICD-10-CM

## 2023-11-21 DIAGNOSIS — I48.91 ATRIAL FIBRILLATION, UNSPECIFIED TYPE (H): Primary | ICD-10-CM

## 2023-11-21 DIAGNOSIS — Z79.899 ENCOUNTER FOR LONG-TERM (CURRENT) USE OF HIGH-RISK MEDICATION: ICD-10-CM

## 2023-11-21 LAB — INR (EXTERNAL): 1.5 (ref 0.9–1.1)

## 2023-11-21 PROCEDURE — 80197 ASSAY OF TACROLIMUS: CPT | Performed by: INTERNAL MEDICINE

## 2023-11-21 NOTE — PROGRESS NOTES
ANTICOAGULATION MANAGEMENT     Edson Thornton Jr. 58 year old male is on warfarin with subtherapeutic INR result. (Goal INR 2.0-3.0)    Recent labs: (last 7 days)     11/21/23  0000   INR 1.5*       ASSESSMENT     Source(s): Chart Review and Patient/Caregiver Call     Warfarin doses taken: Warfarin taken as instructed  Diet: No new diet changes identified  Medication/supplement changes:  Patient stopped Voriconazole on 11/16/23  New illness, injury, or hospitalization: No  Signs or symptoms of bleeding or clotting: No  Previous result: Supratherapeutic  Additional findings: None       PLAN     Recommended plan for ongoing changes affecting INR     Dosing Instructions: Increase your warfarin dose (33% change) with next INR in 1 week       Summary  As of 11/21/2023      Full warfarin instructions:  4 mg every Tue, Thu, Sat; 3 mg all other days   Next INR check:  11/28/2023               Telephone call with Bret who verbalizes understanding and agrees to plan and who agrees to plan and repeated back plan correctly    Patient using outside facility for labs    Education provided:   Taking warfarin: Importance of taking warfarin as instructed  Goal range and lab monitoring: goal range and significance of current result, Importance of therapeutic range, and Importance of following up at instructed interval    Plan made with [facility] Pharmacist Antonella Cedillo, RN  Anticoagulation Clinic  11/21/2023    _______________________________________________________________________     Anticoagulation Episode Summary       Current INR goal:  2.0-3.0   TTR:  79.5% (11.7 mo)   Target end date:  Indefinite   Send INR reminders to:  [facility]    Indications    Atrial fibrillation  unspecified type (H) [I48.91]  Encounter for long-term (current) use of high-risk medication [Z79.899]             Comments:               Anticoagulation Care Providers       Provider Role Specialty Phone number    Abiodun Woods  MD [place] Referring Pulmonary Disease [phone]

## 2023-11-22 DIAGNOSIS — Z94.2 S/P LUNG TRANSPLANT (H): Chronic | ICD-10-CM

## 2023-11-22 LAB
BACTERIA SPEC CULT: ABNORMAL
BACTERIA SPEC CULT: ABNORMAL

## 2023-11-22 RX ORDER — MULTIPLE VITAMINS W/ MINERALS TAB 9MG-400MCG
1 TAB ORAL DAILY
Qty: 30 TABLET | Refills: 11 | Status: SHIPPED | OUTPATIENT
Start: 2023-11-22 | End: 2024-04-08

## 2023-11-26 LAB
TACROLIMUS BLD-MCNC: 6.8 UG/L (ref 5–15)
TME LAST DOSE: NORMAL H
TME LAST DOSE: NORMAL H

## 2023-11-27 DIAGNOSIS — D84.9 IMMUNOSUPPRESSED STATUS (H): ICD-10-CM

## 2023-11-27 DIAGNOSIS — Z94.2 LUNG REPLACED BY TRANSPLANT (H): ICD-10-CM

## 2023-11-27 RX ORDER — TACROLIMUS 1 MG/1
CAPSULE ORAL
Start: 2023-11-27 | End: 2023-12-13

## 2023-11-28 ENCOUNTER — TELEPHONE (OUTPATIENT)
Dept: TRANSPLANT | Facility: CLINIC | Age: 58
End: 2023-11-28
Payer: COMMERCIAL

## 2023-11-28 DIAGNOSIS — Z94.2 LUNG REPLACED BY TRANSPLANT (H): ICD-10-CM

## 2023-11-28 DIAGNOSIS — D84.9 IMMUNOSUPPRESSED STATUS (H): ICD-10-CM

## 2023-11-28 LAB
DONOR IDENTIFICATION: NORMAL
DSA COMMENTS: NORMAL
DSA PRESENT: NO
DSA TEST METHOD: NORMAL
ORGAN: NORMAL
SA 1 CELL: NORMAL
SA 1 TEST METHOD: NORMAL
SA 2 CELL: NORMAL
SA 2 TEST METHOD: NORMAL
SA1 HI RISK ABY: NORMAL
SA1 MOD RISK ABY: NORMAL
SA2 HI RISK ABY: NORMAL
SA2 MOD RISK ABY: NORMAL
UNACCEPTABLE ANTIGENS: NORMAL
UNOS CPRA: 31
ZZZSA 1  COMMENTS: NORMAL
ZZZSA 2 COMMENTS: NORMAL

## 2023-11-28 NOTE — TELEPHONE ENCOUNTER
Spoke with Patient on the phone regarding Tacrolimus level confirmed that it was a 12 hour trough. Level at 6.8 goal 8-10. Increased dosing to 1 mg in AM and 1.5 mg in PM and recheck next week. Orders faxed to local lab.     Patient states he is feeling okay respiratory wise other than a cough bringing up yellow and sometimes green sputum. Grew Fungus in bronch, will review with Dr. Woods tomorrow.     PA approved, just need to schedule for IVIG infusion [phone]. Will figure out what day and time works best for [place] when I call him tomorrow.

## 2023-11-28 NOTE — LETTER
PHYSICIAN ORDERS      DATE & TIME ISSUED: 2023 3:13 PM  PATIENT NAME: Edson Thornton Jr.   : 1965     [facility] MR# [if applicable]: [id number]     DIAGNOSIS:  Lung Transplant  Z94.2    Please draw the following lab on week of 23:    Tacrolimus level (12 hour trough): please use  kit provided by patient and mail to [facility]     Any questions please call: [phone]    Please fax these results to [phone].      .  Abiodun Woods MD  Division of Pulmonary, Allergy, Critical Care and Sleep Medicine  Professor of Medicine

## 2023-11-29 ENCOUNTER — TELEPHONE (OUTPATIENT)
Dept: TRANSPLANT | Facility: CLINIC | Age: 58
End: 2023-11-29
Payer: COMMERCIAL

## 2023-11-29 NOTE — PROGRESS NOTES
Order for non-contrast chest CT sent to [facility] to see if patient can get in sooner than 12/20. Pt will call to schedule.     [place] Fax: [phone]  Phone: [phone]

## 2023-11-29 NOTE — LETTER
PHYSICIAN ORDERS      DATE & TIME ISSUED: 2023 1:12 PM  PATIENT NAME: Edson Thornton Jr.   : 1965     [facility] MR# [if applicable]: [id number]     DIAGNOSIS:  Lung Transplant  Z94.2      Chest CT non contrast (please send Images and results to [facility]) with provided fax number below.       Any questions please call: [phone]    Please fax these results to [phone].      .  Abiodun Woods MD  Division of Pulmonary, Allergy, Critical Care and Sleep Medicine  Professor of Medicine

## 2023-11-29 NOTE — TELEPHONE ENCOUNTER
Reviewed fungal cultures with Dr. Woods, patient to get CT Chest non contrast done locally. Faxed orders over to [place] [facility].    Spoke with patient regarding IVIG infusion locally and which days and time work: patient states not Friday and Thursday this week works and pretty open next week. Prefers afternoon:    Call made to appointment center for Infusion center:     Set up for IVIG infusion Dec 5th at 1PM  Chest CT no openings until Dec 20th at 4PM: will attempt to call [place] to see if any sooner openings.

## 2023-11-29 NOTE — LETTER
PHYSICIAN ORDERS      DATE & TIME ISSUED: 2023 1:43 PM  PATIENT NAME: Edson Thornton Jr. Phone number:[phone]   : 1965     [facility] MR# [if applicable]: [id number]     DIAGNOSIS:  Lung Transplant  Z94.2    Order to schedule patient for non-contrast chest CT.     Any questions please call: Alfred [phone]    Please fax these results to [phone].      .  Abiodun Woods MD  Division of Pulmonary, Allergy, Critical Care and Sleep Medicine  Professor of Medicine

## 2023-11-30 NOTE — PROGRESS NOTES
Pt states [place] needs PA for chest CT. Confirmed with insurance no PA necessary. [place] aware. Pt okay to schedule.

## 2023-12-04 ENCOUNTER — LAB (OUTPATIENT)
Dept: LAB | Facility: CLINIC | Age: 58
End: 2023-12-04
Payer: COMMERCIAL

## 2023-12-04 ENCOUNTER — ANTICOAGULATION THERAPY VISIT (OUTPATIENT)
Dept: ANTICOAGULATION | Facility: CLINIC | Age: 58
End: 2023-12-04
Payer: COMMERCIAL

## 2023-12-04 DIAGNOSIS — I48.91 ATRIAL FIBRILLATION, UNSPECIFIED TYPE (H): Primary | ICD-10-CM

## 2023-12-04 DIAGNOSIS — Z94.2 LUNG REPLACED BY TRANSPLANT (H): Primary | ICD-10-CM

## 2023-12-04 DIAGNOSIS — Z79.899 ENCOUNTER FOR LONG-TERM (CURRENT) USE OF HIGH-RISK MEDICATION: ICD-10-CM

## 2023-12-04 LAB — INR (EXTERNAL): 1.6 (ref 0.9–1.1)

## 2023-12-04 PROCEDURE — 80197 ASSAY OF TACROLIMUS: CPT | Performed by: INTERNAL MEDICINE

## 2023-12-04 NOTE — PROGRESS NOTES
ANTICOAGULATION MANAGEMENT     Edson Thornton Jr. 58 year old male is on warfarin with subtherapeutic INR result. (Goal INR 2.0-3.0)    Recent labs: (last 7 days)     12/04/23  0727   INR 1.6*       ASSESSMENT     Source(s): Chart Review and Patient/Caregiver Call     Warfarin doses taken: Warfarin taken as instructed. Bret to call us back if taken differently as his fiance sets up his medications after he writes down instructed dosing.  Diet: No new diet changes identified  Medication/supplement changes: 11/21/23 Patient stopped Voriconazole. Concurrent use of VORICONAZOLE and ORAL ANTICOAGULANTS may result in an increased risk of bleeding.  New illness, injury, or hospitalization: No  Signs or symptoms of bleeding or clotting: No  Previous result: Subtherapeutic  Additional findings: None  [facility] consult: make sure he did take the 33% increase as instructed. If he took differently/less let me know and we'll make a new plan. If he took as instructed, In march his pre-voriconazole dose was 32 mg/week. We gave about half that back last time. I think we give the rest back today and do 8 mg x 1 boost and then 4 mg Sun, Wed, Fri; 5 ROW. Recheck 7-10 days since it is a larger increase and its a recurent low without progress.        PLAN     Recommended plan for ongoing change(s) affecting INR     Dosing Instructions: booster dose then Increase your warfarin dose (33.3% change) with next INR in 1 week       Summary  As of 12/4/2023      Full warfarin instructions:  12/4: 8 mg; Otherwise 4 mg every Sun, Wed, Fri; 5 mg all other days   Next INR check:  12/11/2023               Telephone call with Bret who verbalizes understanding and agrees to plan  Sent [employer] message with dosing and follow up instructions    Patient using outside facility for labs    Education provided:   Please call back if any changes to your diet, medications or how you've been taking warfarin  Goal range and lab monitoring: goal range and  significance of current result  Symptom monitoring: monitoring for clotting signs and symptoms and monitoring for stroke signs and symptoms  Written instructions provided  Contact [phone] with any changes, questions or concerns.     Plan made with [facility] Pharmacist Antonella Victoria, RN  Anticoagulation Clinic  12/4/2023    _______________________________________________________________________     Anticoagulation Episode Summary       Current INR goal:  2.0-3.0   TTR:  75.8% (11.7 mo)   Target end date:  Indefinite   Send INR reminders to:  [facility]    Indications    Atrial fibrillation  unspecified type (H) [I48.91]  Encounter for long-term (current) use of high-risk medication [Z79.899]             Comments:               Anticoagulation Care Providers       Provider Role Specialty Phone number    Abiodun Woods MD Referring Pulmonary Disease [phone]

## 2023-12-07 LAB
TACROLIMUS BLD-MCNC: 6.1 UG/L (ref 5–15)
TME LAST DOSE: NORMAL H
TME LAST DOSE: NORMAL H

## 2023-12-08 ENCOUNTER — TELEPHONE (OUTPATIENT)
Dept: TRANSPLANT | Facility: CLINIC | Age: 58
End: 2023-12-08

## 2023-12-08 ENCOUNTER — TRANSFERRED RECORDS (OUTPATIENT)
Dept: HEALTH INFORMATION MANAGEMENT | Facility: CLINIC | Age: 58
End: 2023-12-08
Payer: COMMERCIAL

## 2023-12-11 ENCOUNTER — DOCUMENTATION ONLY (OUTPATIENT)
Dept: ANTICOAGULATION | Facility: CLINIC | Age: 58
End: 2023-12-11

## 2023-12-11 DIAGNOSIS — Z79.899 ENCOUNTER FOR LONG-TERM (CURRENT) USE OF HIGH-RISK MEDICATION: ICD-10-CM

## 2023-12-11 DIAGNOSIS — Z94.2 S/P LUNG TRANSPLANT (H): ICD-10-CM

## 2023-12-11 DIAGNOSIS — I63.9 ISCHEMIC STROKE (H): ICD-10-CM

## 2023-12-11 DIAGNOSIS — I48.91 ATRIAL FIBRILLATION, UNSPECIFIED TYPE (H): Primary | ICD-10-CM

## 2023-12-11 RX ORDER — WARFARIN SODIUM 1 MG/1
TABLET ORAL
Qty: 385 TABLET | Refills: 1 | Status: SHIPPED | OUTPATIENT
Start: 2023-12-11 | End: 2024-04-08

## 2023-12-11 NOTE — PROGRESS NOTES
ANTICOAGULATION MANAGEMENT:  Medication Refill    Anticoagulation Summary  As of 12/4/2023      Warfarin maintenance plan:  4 mg (1 mg x 4) every Sun, Wed, Fri; 5 mg (1 mg x 5) all other days   Next INR check:  12/11/2023   Target end date:  Indefinite    Indications    Atrial fibrillation  unspecified type (H) [I48.91]  Encounter for long-term (current) use of high-risk medication [Z79.899]                 Anticoagulation Care Providers       Provider Role Specialty Phone number    Abiodun Woods MD Referring Pulmonary Disease [phone]            Refill Criteria    Visit with referring provider/group: Meets criteria: office visit within referring provider group in the last 1 year on 11/14/23    [facility] referral signed last signed: 03/01/2023; within last year: Yes    Lab monitoring not exceeding 2 weeks overdue: Yes    Edson meets all criteria for refill. Rx instructions and quantity supplied updated to match patient's current dosing plan. Warfarin 90 day supply with 1 refill granted per [facility] protocol     Spoke with Bret and verified that he uses warfarin 1 mg tablets.    Mesha Chau RN  Anticoagulation Clinic

## 2023-12-13 ENCOUNTER — TELEPHONE (OUTPATIENT)
Dept: TRANSPLANT | Facility: CLINIC | Age: 58
End: 2023-12-13
Payer: COMMERCIAL

## 2023-12-13 DIAGNOSIS — Z94.2 LUNG REPLACED BY TRANSPLANT (H): ICD-10-CM

## 2023-12-13 DIAGNOSIS — D84.9 IMMUNOSUPPRESSED STATUS (H): ICD-10-CM

## 2023-12-13 RX ORDER — TACROLIMUS 1 MG/1
1 CAPSULE ORAL 2 TIMES DAILY
Start: 2023-12-13 | End: 2023-12-19

## 2023-12-13 RX ORDER — TACROLIMUS 0.5 MG/1
0.5 CAPSULE ORAL 2 TIMES DAILY
Start: 2023-12-13 | End: 2023-12-19

## 2023-12-13 NOTE — TELEPHONE ENCOUNTER
tacrolimus level was 6.1 from 12/4/23.   goal is 8-10.      Dose increased to 1.5mg in AM and 1.5mg in PM  Recheck level in a week    Patient understanding of plan  Writer also checked in on BP after increasing Amlodipine at last clinic visit    BP: in the morning running 130/85, reports evening BP is higher but patient has been taking BP reading before taking his amlodipine. Asked patient to take medication and take BP one hour after. Will check in next week

## 2023-12-15 ENCOUNTER — LAB (OUTPATIENT)
Dept: LAB | Facility: CLINIC | Age: 58
End: 2023-12-15
Payer: COMMERCIAL

## 2023-12-15 DIAGNOSIS — Z94.2 LUNG REPLACED BY TRANSPLANT (H): Primary | ICD-10-CM

## 2023-12-15 LAB — INR (EXTERNAL): 2.2 (ref 0.9–1.1)

## 2023-12-15 PROCEDURE — 80197 ASSAY OF TACROLIMUS: CPT | Performed by: INTERNAL MEDICINE

## 2023-12-18 ENCOUNTER — ANTICOAGULATION THERAPY VISIT (OUTPATIENT)
Dept: ANTICOAGULATION | Facility: CLINIC | Age: 58
End: 2023-12-18
Payer: COMMERCIAL

## 2023-12-18 DIAGNOSIS — I48.91 ATRIAL FIBRILLATION, UNSPECIFIED TYPE (H): Primary | ICD-10-CM

## 2023-12-18 DIAGNOSIS — Z79.899 ENCOUNTER FOR LONG-TERM (CURRENT) USE OF HIGH-RISK MEDICATION: ICD-10-CM

## 2023-12-18 LAB
TACROLIMUS BLD-MCNC: 4.5 UG/L (ref 5–15)
TME LAST DOSE: ABNORMAL H
TME LAST DOSE: ABNORMAL H

## 2023-12-18 NOTE — PROGRESS NOTES
ANTICOAGULATION MANAGEMENT     Edson Thornton Jr. 58 year old male is on warfarin with therapeutic INR result. (Goal INR 2.0-3.0)    Recent labs: (last 7 days)     12/15/23  1200   INR 2.2*       ASSESSMENT     Source(s): Chart Review and Patient/Caregiver Call     Warfarin doses taken: Warfarin taken as instructed  Diet: No new diet changes identified  Medication/supplement changes: None noted  New illness, injury, or hospitalization: No  Signs or symptoms of bleeding or clotting: No  Previous result: Subtherapeutic  Additional findings: None       PLAN     Recommended plan for no diet, medication or health factor changes affecting INR     Dosing Instructions: Continue your current warfarin dose with next INR in 2 weeks       Summary  As of 12/18/2023      Full warfarin instructions:  4 mg every Sun, Wed, Fri; 5 mg all other days   Next INR check:  12/29/2023               Telephone call with Bret who verbalizes understanding and agrees to plan  [employer] message sent    Patient using outside facility for labs    Education provided:   Contact [phone] with any changes, questions or concerns.     Plan made per [facility] anticoagulation protocol    Annabel Leiva RN  Anticoagulation Clinic  12/18/2023    _______________________________________________________________________     Anticoagulation Episode Summary       Current INR goal:  2.0-3.0   TTR:  73.4% (11.6 mo)   Target end date:  Indefinite   Send INR reminders to:  [facility]    Indications    Atrial fibrillation  unspecified type (H) [I48.91]  Encounter for long-term (current) use of high-risk medication [Z79.899]             Comments:               Anticoagulation Care Providers       Provider Role Specialty Phone number    Abiodun Woods MD Referring Pulmonary Disease [phone]

## 2023-12-19 ENCOUNTER — TELEPHONE (OUTPATIENT)
Dept: TRANSPLANT | Facility: CLINIC | Age: 58
End: 2023-12-19
Payer: COMMERCIAL

## 2023-12-19 DIAGNOSIS — Z94.2 LUNG REPLACED BY TRANSPLANT (H): ICD-10-CM

## 2023-12-19 DIAGNOSIS — D84.9 IMMUNOSUPPRESSED STATUS (H): ICD-10-CM

## 2023-12-19 RX ORDER — TACROLIMUS 1 MG/1
2 CAPSULE ORAL 2 TIMES DAILY
Qty: 180 CAPSULE | Refills: 3 | Status: SHIPPED | OUTPATIENT
Start: 2023-12-19 | End: 2024-04-08

## 2023-12-19 RX ORDER — TACROLIMUS 1 MG/1
1 CAPSULE ORAL 2 TIMES DAILY
Qty: 180 CAPSULE | Refills: 3 | Status: SHIPPED | OUTPATIENT
Start: 2023-12-19 | End: 2023-12-19

## 2023-12-19 RX ORDER — TACROLIMUS 0.5 MG/1
0.5 CAPSULE ORAL 2 TIMES DAILY
Qty: 180 CAPSULE | Refills: 3 | Status: SHIPPED | OUTPATIENT
Start: 2023-12-19 | End: 2023-12-19

## 2023-12-19 RX ORDER — TACROLIMUS 0.5 MG/1
CAPSULE ORAL
Qty: 180 CAPSULE | Refills: 3 | Status: SHIPPED | OUTPATIENT
Start: 2023-12-19 | End: 2024-04-25

## 2023-12-19 NOTE — TELEPHONE ENCOUNTER
Calling patient to followup on Tacrolimus level.     LVM. Mychart message sent    ADDEND: spoke with patient and he states this was a 12 hour trough, no new medications, no missed doses and confirmed taking 1.5 mg BID.     Will INCREASE to 2 mg BID and recheck labs on 12/26. Patient agreed with plan.

## 2023-12-27 ENCOUNTER — ANTICOAGULATION THERAPY VISIT (OUTPATIENT)
Dept: ANTICOAGULATION | Facility: CLINIC | Age: 58
End: 2023-12-27
Payer: COMMERCIAL

## 2023-12-27 ENCOUNTER — LAB (OUTPATIENT)
Dept: LAB | Facility: CLINIC | Age: 58
End: 2023-12-27
Payer: COMMERCIAL

## 2023-12-27 DIAGNOSIS — I48.91 ATRIAL FIBRILLATION, UNSPECIFIED TYPE (H): Primary | ICD-10-CM

## 2023-12-27 DIAGNOSIS — Z94.2 LUNG REPLACED BY TRANSPLANT (H): Primary | ICD-10-CM

## 2023-12-27 DIAGNOSIS — Z79.899 ENCOUNTER FOR LONG-TERM (CURRENT) USE OF HIGH-RISK MEDICATION: ICD-10-CM

## 2023-12-27 LAB — INR (EXTERNAL): 1.8 (ref 0.9–1.1)

## 2023-12-27 PROCEDURE — 80197 ASSAY OF TACROLIMUS: CPT | Performed by: INTERNAL MEDICINE

## 2023-12-27 NOTE — PROGRESS NOTES
ANTICOAGULATION MANAGEMENT     Edson Thornton Jr. 58 year old male is on warfarin with subtherapeutic INR result. (Goal INR 2.0-3.0)    Recent labs: (last 7 days)     12/27/23  0726   INR 1.8*       ASSESSMENT     Source(s): Chart Review and Patient/Caregiver Call     Warfarin doses taken: Warfarin taken as instructed  Diet: No new diet changes identified  Medication/supplement changes:  11/21/23 Patient stopped Voriconazole.  New illness, injury, or hospitalization: No  Signs or symptoms of bleeding or clotting: No  Previous result: Therapeutic last visit; previously outside of goal range  Additional findings: None       PLAN     Recommended plan for no diet, medication or health factor changes affecting INR     Dosing Instructions: Increase your warfarin dose (6.2% change) with next INR in 2 weeks       Summary  As of 12/27/2023      Full warfarin instructions:  4 mg every Sun; 5 mg all other days   Next INR check:  1/10/2024               Telephone call with [place] who verbalizes understanding and agrees to plan    Patient using outside facility for labs    Education provided:   Taking warfarin: Importance of taking warfarin as instructed  Goal range and lab monitoring: goal range and significance of current result  Contact [phone] with any changes, questions or concerns.     Plan made per [facility] anticoagulation protocol    Freda Bhakta RN  Anticoagulation Clinic  12/27/2023    _______________________________________________________________________     Anticoagulation Episode Summary       Current INR goal:  2.0-3.0   TTR:  72.0% (11.7 mo)   Target end date:  Indefinite   Send INR reminders to:  [facility]    Indications    Atrial fibrillation  unspecified type (H) [I48.91]  Encounter for long-term (current) use of high-risk medication [Z79.899]             Comments:               Anticoagulation Care Providers       Provider Role Specialty Phone number    Abiodun Woods MD Referring  Pulmonary Disease [phone]

## 2023-12-29 LAB
TACROLIMUS BLD-MCNC: 8.3 UG/L (ref 5–15)
TME LAST DOSE: NORMAL H
TME LAST DOSE: NORMAL H

## 2023-12-30 ENCOUNTER — HEALTH MAINTENANCE LETTER (OUTPATIENT)
Age: 58
End: 2023-12-30

## 2024-01-05 ENCOUNTER — LAB (OUTPATIENT)
Dept: LAB | Facility: CLINIC | Age: 59
End: 2024-01-05
Payer: COMMERCIAL

## 2024-01-05 DIAGNOSIS — Z94.2 LUNG REPLACED BY TRANSPLANT (H): Primary | ICD-10-CM

## 2024-01-05 PROCEDURE — 80197 ASSAY OF TACROLIMUS: CPT | Performed by: INTERNAL MEDICINE

## 2024-01-08 LAB
TACROLIMUS BLD-MCNC: 7.9 UG/L (ref 5–15)
TME LAST DOSE: NORMAL H
TME LAST DOSE: NORMAL H

## 2024-01-10 PROBLEM — B44.9 ASPERGILLUS (H): Status: ACTIVE | Noted: 2023-03-08

## 2024-01-10 PROBLEM — J18.9 LUNG INFECTION: Status: ACTIVE | Noted: 2023-07-12

## 2024-01-12 ENCOUNTER — LAB (OUTPATIENT)
Dept: LAB | Facility: CLINIC | Age: 59
End: 2024-01-12

## 2024-01-12 ENCOUNTER — TELEPHONE (OUTPATIENT)
Dept: TRANSPLANT | Facility: CLINIC | Age: 59
End: 2024-01-12

## 2024-01-12 ENCOUNTER — TELEPHONE (OUTPATIENT)
Dept: TRANSPLANT | Facility: CLINIC | Age: 59
End: 2024-01-12
Payer: COMMERCIAL

## 2024-01-12 ENCOUNTER — ANTICOAGULATION THERAPY VISIT (OUTPATIENT)
Dept: ANTICOAGULATION | Facility: CLINIC | Age: 59
End: 2024-01-12
Payer: COMMERCIAL

## 2024-01-12 ENCOUNTER — TELEPHONE (OUTPATIENT)
Dept: ANTICOAGULATION | Facility: CLINIC | Age: 59
End: 2024-01-12
Payer: COMMERCIAL

## 2024-01-12 DIAGNOSIS — Z79.899 ENCOUNTER FOR LONG-TERM (CURRENT) USE OF HIGH-RISK MEDICATION: ICD-10-CM

## 2024-01-12 DIAGNOSIS — Z94.2 LUNG REPLACED BY TRANSPLANT (H): Primary | ICD-10-CM

## 2024-01-12 DIAGNOSIS — I48.91 ATRIAL FIBRILLATION, UNSPECIFIED TYPE (H): Primary | ICD-10-CM

## 2024-01-12 LAB — INR (EXTERNAL): 2.3 (ref 0.9–1.1)

## 2024-01-12 PROCEDURE — 80197 ASSAY OF TACROLIMUS: CPT | Performed by: INTERNAL MEDICINE

## 2024-01-12 RX ORDER — LEVOFLOXACIN 250 MG/1
750 TABLET, FILM COATED ORAL DAILY
Qty: 42 TABLET | Refills: 0 | Status: SHIPPED | OUTPATIENT
Start: 2024-01-12 | End: 2024-01-26

## 2024-01-12 NOTE — TELEPHONE ENCOUNTER
Emma Simon, sorry I didn t respond to your last msg.  I will be going for labs tomorrow. I will send bp readings tonight. Need to know what I can use for congestion and runny nose. Thx    Writer called patient:  Increased congestion x1 week  Bringing up yellow/clear sputum  Breathing feels good, no SOB, no fevers.     Order faxed for sputum samples, covid pcr and RVP locally     Reviewed with Dr. Woods:  -give Levaquin 750mg x14 days. Pt knows to watch for achilles tendon pain. Will call [facility] with any worsening symptoms

## 2024-01-12 NOTE — PROGRESS NOTES
ANTICOAGULATION MANAGEMENT     Edson Thornton Jr. 58 year old male is on warfarin with therapeutic INR result. (Goal INR 2.0-3.0)    Recent labs: (last 7 days)     01/12/24  1123   INR 2.3*       ASSESSMENT     Source(s): Chart Review and Patient/Caregiver Call     Warfarin doses taken: Warfarin taken as instructed  Diet: No new diet changes identified  Medication/supplement changes: None noted  New illness, injury, or hospitalization: Yes: Cough with productive sputum-he will take a COVID test (same symptoms as the last time he had COVID), otherwise feels fine.   Signs or symptoms of bleeding or clotting: No  Previous result: Subtherapeutic  Additional findings: None       PLAN     Recommended plan for temporary change(s) affecting INR     Dosing Instructions: Continue your current warfarin dose with next INR in 1 week     Summary  As of 1/12/2024      Full warfarin instructions:  4 mg every Sun; 5 mg all other days   Next INR check:  1/19/2024               Telephone call with Bret who agrees to plan and repeated back plan correctly    Patient using outside facility for labs    Education provided:   Goal range and lab monitoring: goal range and significance of current result and Importance of following up at instructed interval  Importance of notifying anticoagulation clinic for: changes in medications; a sooner lab recheck maybe needed and diarrhea, nausea/vomiting, reduced intake, cold/flu, and/or infections; a sooner lab recheck maybe needed  Contact [phone] with any changes, questions or concerns.     Plan made per [facility] anticoagulation protocol    ESSIE COLÓN RN  Anticoagulation Clinic  1/12/2024    _______________________________________________________________________     Anticoagulation Episode Summary       Current INR goal:  2.0-3.0   TTR:  70.1% (11.7 mo)   Target end date:  Indefinite   Send INR reminders to:  [facility]    Indications    Atrial fibrillation  unspecified type (H)  [I48.91]  Encounter for long-term (current) use of high-risk medication [Z79.899]             Comments:               Anticoagulation Care Providers       Provider Role Specialty Phone number    Abiodun Woods MD Referring Pulmonary Disease [phone]

## 2024-01-12 NOTE — TELEPHONE ENCOUNTER
ANTICOAGULATION  MANAGEMENT     Interacting Medication Review    Interacting medication(s): Levofloxacin (Levaquin) with warfarin.    Duration: 14 days  (1/12/24 to 1/26/24)    Indication: Prophylaxis    New medication?: Yes, interaction may increase INR and risk of bleeding. Closer INR monitoring recommended.   Concurrent use of LEVOFLOXACIN and WARFARIN may result in an increased risk of bleeding.     PLAN     Continue your current warfarin dose with next INR in 5 days        Summary  As of 1/12/2024      Full warfarin instructions:  4 mg every Sun; 5 mg all other days   Next INR check:  1/17/2024               Telephone call with Bret who agrees to plan and repeated back plan correctly    New recheck date updated on anticoagulation calendar     Plan made per [facility] anticoagulation protocol    ESSIE COLÓN RN  Anticoagulation Clinic

## 2024-01-12 NOTE — TELEPHONE ENCOUNTER
Patient Call: General  Route to LPN    Reason for call: Dale from [facility] Needing to Clarify Script for  levofloxacin (LEVAQUIN) 250 MG tablet that was sent over today.    Call back needed? Yes    Return Call Needed  Same as documented in contacts section  When to return call?: Same day: Route High Priority

## 2024-01-12 NOTE — LETTER
PHYSICIAN ORDERS      DATE & TIME ISSUED: 2024 11:40 AM  PATIENT NAME: Edson Thornton Jr.   : 1965     [facility] MR# [if applicable]: [id number]     DIAGNOSIS:  Lung Transplant  Z94.2    Order for Aerobic bacterial sputum culture and fungal sputum culture           Any questions please call: Alfred [phone]    Please fax these results to [phone].      .  Abiodun Woods MD  Division of Pulmonary, Allergy, Critical Care and Sleep Medicine  Professor of Medicine

## 2024-01-12 NOTE — LETTER
PHYSICIAN ORDERS      DATE & TIME ISSUED: 2024 11:38 AM  PATIENT NAME: Edson Thornton Jr.   : 1965     [facility] MR# [if applicable]: [id number]     DIAGNOSIS:  Lung Transplant  Z94.2    Please collect Respiratory viral panel PCR swab or whatever tests you have that are an equivalent viral panel (Adenovirus, Coronavirus, Human Metapneumovirus, Human Rhinovirus/Enterovirus, Influenza A (H1, H3, 2009 H1N1), Influenza B, Respiratory Syncytial Virus (RSV) A, Respiratory Syncytial Virus (RSV) B, Parainfluenza Virus (1, 2, 3 and 4), Chlamydia pneumoniae and Mycoplasma pneumoniae.) on 2024    Please collect covid PCR on 2024        Any questions please call: Alfred [phone]    Please fax these results to [phone].      .  Abiodun Woods MD  Division of Pulmonary, Allergy, Critical Care and Sleep Medicine  Professor of Medicine

## 2024-01-13 ENCOUNTER — TELEPHONE (OUTPATIENT)
Dept: TRANSPLANT | Facility: CLINIC | Age: 59
End: 2024-01-13
Payer: COMMERCIAL

## 2024-01-13 RX ORDER — FINASTERIDE 5 MG/1
5 TABLET, FILM COATED ORAL DAILY
COMMUNITY

## 2024-01-13 NOTE — TELEPHONE ENCOUNTER
Triage:   Called pt to discuss result of resp panel  Human Rhinovirus/Enterovirus  Not Detected Detected Abnormal      Pt reports he feels about the same today. OK to take Tylenol, Flonase, NS nasal spray, non-d version decongestants.     Also reports Urology stated him on Finasteride 5 mg.     Enc to call with concerns/questions/

## 2024-01-15 ENCOUNTER — TELEPHONE (OUTPATIENT)
Dept: TRANSPLANT | Facility: CLINIC | Age: 59
End: 2024-01-15
Payer: COMMERCIAL

## 2024-01-15 RX ORDER — ALBUTEROL SULFATE 90 UG/1
1-2 AEROSOL, METERED RESPIRATORY (INHALATION)
Start: 2024-01-15

## 2024-01-15 RX ORDER — DIPHENHYDRAMINE HYDROCHLORIDE 50 MG/ML
50 INJECTION INTRAMUSCULAR; INTRAVENOUS
Start: 2024-01-15

## 2024-01-15 RX ORDER — HEPARIN SODIUM,PORCINE 10 UNIT/ML
5-20 VIAL (ML) INTRAVENOUS DAILY PRN
OUTPATIENT
Start: 2024-01-15

## 2024-01-15 RX ORDER — DIPHENHYDRAMINE HCL 25 MG
50 CAPSULE ORAL ONCE
OUTPATIENT
Start: 2024-01-15

## 2024-01-15 RX ORDER — ACETAMINOPHEN 325 MG/1
650 TABLET ORAL ONCE
Start: 2024-01-15

## 2024-01-15 RX ORDER — METHYLPREDNISOLONE SODIUM SUCCINATE 125 MG/2ML
125 INJECTION, POWDER, LYOPHILIZED, FOR SOLUTION INTRAMUSCULAR; INTRAVENOUS
Start: 2024-01-15

## 2024-01-15 RX ORDER — MEPERIDINE HYDROCHLORIDE 25 MG/ML
25 INJECTION INTRAMUSCULAR; INTRAVENOUS; SUBCUTANEOUS EVERY 30 MIN PRN
OUTPATIENT
Start: 2024-01-15

## 2024-01-15 RX ORDER — EPINEPHRINE 1 MG/ML
0.3 INJECTION, SOLUTION, CONCENTRATE INTRAVENOUS EVERY 5 MIN PRN
OUTPATIENT
Start: 2024-01-15

## 2024-01-15 RX ORDER — ALBUTEROL SULFATE 0.83 MG/ML
2.5 SOLUTION RESPIRATORY (INHALATION)
OUTPATIENT
Start: 2024-01-15

## 2024-01-15 RX ORDER — HEPARIN SODIUM (PORCINE) LOCK FLUSH IV SOLN 100 UNIT/ML 100 UNIT/ML
5 SOLUTION INTRAVENOUS
OUTPATIENT
Start: 2024-01-15

## 2024-01-15 NOTE — PLAN OF CARE
Physical Therapy Discharge Summary    Reason for therapy discharge:    Discharged to acute rehabilitation facility.    Progress towards therapy goal(s). See goals on Care Plan in [facility] electronic health record for goal details.  Goals not met.  Barriers to achieving goals:   discharge from facility.    Therapy recommendation(s):    Continued therapy is recommended.  Rationale/Recommendations:  ARU to address impairments affecting functional mobility.       eating dinner, sitting up

## 2024-01-15 NOTE — TELEPHONE ENCOUNTER
IgG level 482. Replaced with IVIG x1. Repeat IgG level in a month  Orders faxed to local infusion center.

## 2024-01-16 ENCOUNTER — TELEPHONE (OUTPATIENT)
Dept: TRANSPLANT | Facility: CLINIC | Age: 59
End: 2024-01-16
Payer: COMMERCIAL

## 2024-01-16 LAB
TACROLIMUS BLD-MCNC: 8.6 UG/L (ref 5–15)
TME LAST DOSE: NORMAL H
TME LAST DOSE: NORMAL H

## 2024-01-19 ENCOUNTER — ANTICOAGULATION THERAPY VISIT (OUTPATIENT)
Dept: ANTICOAGULATION | Facility: CLINIC | Age: 59
End: 2024-01-19
Payer: COMMERCIAL

## 2024-01-19 ENCOUNTER — LAB (OUTPATIENT)
Dept: LAB | Facility: CLINIC | Age: 59
End: 2024-01-19

## 2024-01-19 DIAGNOSIS — Z79.899 ENCOUNTER FOR LONG-TERM (CURRENT) USE OF HIGH-RISK MEDICATION: ICD-10-CM

## 2024-01-19 DIAGNOSIS — I48.91 ATRIAL FIBRILLATION, UNSPECIFIED TYPE (H): Primary | ICD-10-CM

## 2024-01-19 LAB — INR (EXTERNAL): 2.3 (ref 0.9–1.1)

## 2024-01-19 PROCEDURE — 80197 ASSAY OF TACROLIMUS: CPT | Performed by: INTERNAL MEDICINE

## 2024-01-19 NOTE — PROGRESS NOTES
"ANTICOAGULATION MANAGEMENT     Edson Thornton Jr. 58 year old male is on warfarin with therapeutic INR result. (Goal INR 2.0-3.0)    Recent labs: (last 7 days)     01/19/24  0733   INR 2.3*       ASSESSMENT     Source(s): Chart Review and Patient/Caregiver Call     Warfarin doses taken:  Bret states he believes he is taking Warfarin as instructed las [facility] encounter but is at work and knows to call us if he is taking it differently.  Diet: No new diet changes identified  Medication/supplement changes:  On 14-day course of Levaquin through 1/26/24 which may increase INR. 1/15/24 started Finasteride which is not expected to interact with Warfarin. Bret to have IVIG infusion soon, no interaction with Warfarin anticipated.  New illness, injury, or hospitalization: No - ongoing congestion/productive cough/cold symptoms. Negative for COVID/Influenza 1/12/24. Denies taking OTC medications or new ABX.  Signs or symptoms of bleeding or clotting: Yes: \"Just normal bruising.\"  Previous result: Therapeutic last visit; previously outside of goal range  Additional findings: None       PLAN     Recommended plan for ongoing change(s) affecting INR     Dosing Instructions: Continue your current warfarin dose with next INR in 1 week       Summary  As of 1/19/2024      Full warfarin instructions:  4 mg every Sun; 5 mg all other days   Next INR check:  1/26/2024               Telephone call with Bret who verbalizes understanding and agrees to plan  Sent [employer] message with dosing and follow up instructions    Patient using outside facility for labs    Education provided:   Please call back if any changes to your diet, medications or how you've been taking warfarin  Goal range and lab monitoring: goal range and significance of current result and Importance of following up at instructed interval  Interaction IS anticipated between warfarin and Levofloxacin, is not anticipated between warfarin and IVIG or Finasteride.  Written " instructions provided    Plan made per [facility] anticoagulation protocol    Margoth Victoria, RN  Anticoagulation Clinic  1/19/2024    _______________________________________________________________________     Anticoagulation Episode Summary       Current INR goal:  2.0-3.0   TTR:  70.3% (11.7 mo)   Target end date:  Indefinite   [facility]    Indications    Atrial fibrillation  unspecified type (H) [I48.91]  Encounter for long-term (current) use of high-risk medication [Z79.899]             Comments:               Anticoagulation Care Providers       Provider Role Specialty Phone number    Abiodun Woods MD Referring Pulmonary Disease [phone]

## 2024-01-22 ENCOUNTER — TELEPHONE (OUTPATIENT)
Dept: TRANSPLANT | Facility: CLINIC | Age: 59
End: 2024-01-22
Payer: COMMERCIAL

## 2024-01-22 LAB
TACROLIMUS BLD-MCNC: 9 UG/L (ref 5–15)
TME LAST DOSE: NORMAL H
TME LAST DOSE: NORMAL H

## 2024-01-22 NOTE — TELEPHONE ENCOUNTER
Provider Call: General  Route to LPN    Reason for call: She received a VM to set up IV/IG  there  She needs orders faxed to [phone] and she will get it set up      Call back needed? Yes ir questions     Return Call Needed  Same as documented in contacts section  When to return call?: Same day: Route High Priority

## 2024-01-25 ENCOUNTER — TELEPHONE (OUTPATIENT)
Dept: TRANSPLANT | Facility: CLINIC | Age: 59
End: 2024-01-25
Payer: COMMERCIAL

## 2024-01-25 ENCOUNTER — TELEPHONE (OUTPATIENT)
Dept: PULMONOLOGY | Facility: CLINIC | Age: 59
End: 2024-01-25
Payer: COMMERCIAL

## 2024-01-25 NOTE — TELEPHONE ENCOUNTER
"The patient is undergoing evaluation in the ER in [place].  I spoke to the ER physician.  He presents with malaise, nausea with 1 episode of vomiting.  He had a recent \"cold\" and is finishing a course of levofloxacin.  He did receive an IgG infusion yesterday.  Nasal swab for virus (+) for rhino/enterovirus.  No influenza, RSV or COVID.  CBC, basic metabolic, LFTs and lipase were all within normal limits.  Cultures are pending.  Chest x-ray was unrevealing.  The patient will be treated symptomatically and instructed to contact the ER or transplant coordinators for any symptomatic progression.  Abiodun Woods MD   "

## 2024-01-25 NOTE — TELEPHONE ENCOUNTER
"Dizzy, HA, when moving, weak, vision \"off\" patient having a hard time describing it.   Started this morning. Vomited x1 this morning, before he took any of his pills.   No fevers that he knows of.   Breathing doesn't feel bad. No cough, no sputum.   PCP has no appointments     Vitals stable, HR 57      Got IVIG yesterday. Has gotten IVIG before in the past without any lingering symptoms.     Advised patient check in to local ER. Report given to ER. Transplant provider on-call notified.     "

## 2024-01-29 ENCOUNTER — ANTICOAGULATION THERAPY VISIT (OUTPATIENT)
Dept: ANTICOAGULATION | Facility: CLINIC | Age: 59
End: 2024-01-29
Payer: COMMERCIAL

## 2024-01-29 DIAGNOSIS — Z79.899 ENCOUNTER FOR LONG-TERM (CURRENT) USE OF HIGH-RISK MEDICATION: ICD-10-CM

## 2024-01-29 DIAGNOSIS — I48.91 ATRIAL FIBRILLATION, UNSPECIFIED TYPE (H): Primary | ICD-10-CM

## 2024-01-29 LAB — INR (EXTERNAL): 2.6 (ref 0.9–1.1)

## 2024-01-29 NOTE — PROGRESS NOTES
ANTICOAGULATION MANAGEMENT     Edson Thornton Jr. 58 year old male is on warfarin with therapeutic INR result. (Goal INR 2.0-3.0)    Recent labs: (last 7 days)     01/26/24  0100   INR 2.6*       ASSESSMENT     Source(s): Chart Review and Patient/Caregiver Call     Warfarin doses taken: Warfarin taken as instructed  Diet: No new diet changes identified  Medication/supplement changes: None noted  New illness, injury, or hospitalization: Yes: in ER for influ on 1/26, now feeling better  Signs or symptoms of bleeding or clotting: No  Previous result: Therapeutic last 2(+) visits  Additional findings: None       PLAN     Recommended plan for temporary change(s) affecting INR     Dosing Instructions: Continue your current warfarin dose with next INR in 1 week       Summary  As of 1/29/2024      Full warfarin instructions:  4 mg every Sun; 5 mg all other days   Next INR check:  2/2/2024               Telephone call with Bret who verbalizes understanding and agrees to plan    Patient using outside facility for labs, states he is having labs done this Friday      Education provided:   Please call back if any changes to your diet, medications or how you've been taking warfarin  Goal range and lab monitoring: goal range and significance of current result    Plan made per [facility] anticoagulation protocol    Margaux Morrow RN  Anticoagulation Clinic  1/29/2024    _______________________________________________________________________     Anticoagulation Episode Summary       Current INR goal:  2.0-3.0   TTR:  72.4% (11.6 mo)   Target end date:  Indefinite   Send INR reminders to:  [facility]    Indications    Atrial fibrillation  unspecified type (H) [I48.91]  Encounter for long-term (current) use of high-risk medication [Z79.899]             Comments:               Anticoagulation Care Providers       Provider Role Specialty Phone number    Abiodun Woods MD Referring Pulmonary Disease [phone]

## 2024-01-30 ENCOUNTER — DOCUMENTATION ONLY (OUTPATIENT)
Dept: ANTICOAGULATION | Facility: CLINIC | Age: 59
End: 2024-01-30

## 2024-01-30 DIAGNOSIS — I48.91 ATRIAL FIBRILLATION, UNSPECIFIED TYPE (H): Primary | ICD-10-CM

## 2024-01-30 NOTE — PROGRESS NOTES
ANTICOAGULATION CLINIC REFERRAL RENEWAL REQUEST       An annual renewal order is required for all patients referred to [facility].?  Please review and sign the pended referral order for Edson Tohrnton Jr..       ANTICOAGULATION SUMMARY      Warfarin indication(s)   Atrial Fibrillation    Mechanical heart valve present?  NO       Current goal range   INR: 2.0-3.0     Goal appropriate for indication? Goal INR 2-3, standard for indication(s) above     Time in Therapeutic Range (TTR)  (Goal > 60%) 71.5%       Office visit with referring provider's group within last year yes on 11/14/23     Please fax signed orders to: [facility] Fax [phone]     ESSIE COLÓN RN  [facility]

## 2024-02-01 PROCEDURE — 80197 ASSAY OF TACROLIMUS: CPT | Performed by: INTERNAL MEDICINE

## 2024-02-02 ENCOUNTER — LAB (OUTPATIENT)
Dept: LAB | Facility: CLINIC | Age: 59
End: 2024-02-02
Payer: COMMERCIAL

## 2024-02-02 DIAGNOSIS — Z94.2 LUNG REPLACED BY TRANSPLANT (H): Primary | ICD-10-CM

## 2024-02-02 LAB — INR (EXTERNAL): 2.6 (ref 0.9–1.1)

## 2024-02-05 ENCOUNTER — ANTICOAGULATION THERAPY VISIT (OUTPATIENT)
Dept: ANTICOAGULATION | Facility: CLINIC | Age: 59
End: 2024-02-05
Payer: COMMERCIAL

## 2024-02-05 DIAGNOSIS — I48.91 ATRIAL FIBRILLATION, UNSPECIFIED TYPE (H): Primary | ICD-10-CM

## 2024-02-05 DIAGNOSIS — Z79.899 ENCOUNTER FOR LONG-TERM (CURRENT) USE OF HIGH-RISK MEDICATION: ICD-10-CM

## 2024-02-05 LAB
TACROLIMUS BLD-MCNC: 7.9 UG/L (ref 5–15)
TME LAST DOSE: NORMAL H
TME LAST DOSE: NORMAL H

## 2024-02-05 NOTE — RESULT ENCOUNTER NOTE
Tacrolimus level 7.9 at 12 hours, on 2/1/24.  Goal 8-10.   Current dose 2 mg in AM, 2 mg in PM    Level at goal.  No dose change.    [employer] message sent

## 2024-02-12 DIAGNOSIS — Z00.6 RESEARCH STUDY PATIENT: Primary | ICD-10-CM

## 2024-02-13 ENCOUNTER — OFFICE VISIT (OUTPATIENT)
Dept: PULMONOLOGY | Facility: CLINIC | Age: 59
End: 2024-02-13
Payer: COMMERCIAL

## 2024-02-13 ENCOUNTER — LAB (OUTPATIENT)
Dept: LAB | Facility: CLINIC | Age: 59
End: 2024-02-13
Attending: INTERNAL MEDICINE
Payer: COMMERCIAL

## 2024-02-13 ENCOUNTER — OFFICE VISIT (OUTPATIENT)
Dept: TRANSPLANT | Facility: CLINIC | Age: 59
End: 2024-02-13
Attending: INTERNAL MEDICINE
Payer: COMMERCIAL

## 2024-02-13 ENCOUNTER — ANCILLARY PROCEDURE (OUTPATIENT)
Dept: ULTRASOUND IMAGING | Facility: CLINIC | Age: 59
End: 2024-02-13
Attending: INTERNAL MEDICINE
Payer: COMMERCIAL

## 2024-02-13 ENCOUNTER — ANCILLARY PROCEDURE (OUTPATIENT)
Dept: GENERAL RADIOLOGY | Facility: CLINIC | Age: 59
End: 2024-02-13
Attending: INTERNAL MEDICINE
Payer: COMMERCIAL

## 2024-02-13 ENCOUNTER — ANTICOAGULATION THERAPY VISIT (OUTPATIENT)
Dept: ANTICOAGULATION | Facility: CLINIC | Age: 59
End: 2024-02-13

## 2024-02-13 ENCOUNTER — OFFICE VISIT (OUTPATIENT)
Dept: PULMONOLOGY | Facility: CLINIC | Age: 59
End: 2024-02-13
Attending: INTERNAL MEDICINE
Payer: COMMERCIAL

## 2024-02-13 VITALS
HEART RATE: 58 BPM | WEIGHT: 155 LBS | DIASTOLIC BLOOD PRESSURE: 84 MMHG | BODY MASS INDEX: 26.61 KG/M2 | SYSTOLIC BLOOD PRESSURE: 138 MMHG | OXYGEN SATURATION: 96 %

## 2024-02-13 DIAGNOSIS — M81.8 OTHER OSTEOPOROSIS WITHOUT CURRENT PATHOLOGICAL FRACTURE: ICD-10-CM

## 2024-02-13 DIAGNOSIS — M79.652 LEFT THIGH PAIN: ICD-10-CM

## 2024-02-13 DIAGNOSIS — I10 PRIMARY HYPERTENSION: ICD-10-CM

## 2024-02-13 DIAGNOSIS — D84.9 IMMUNOSUPPRESSED STATUS (H): ICD-10-CM

## 2024-02-13 DIAGNOSIS — E83.42 HYPOMAGNESEMIA: ICD-10-CM

## 2024-02-13 DIAGNOSIS — Z79.899 ENCOUNTER FOR LONG-TERM (CURRENT) USE OF HIGH-RISK MEDICATION: ICD-10-CM

## 2024-02-13 DIAGNOSIS — Z94.2 S/P LUNG TRANSPLANT (H): Chronic | ICD-10-CM

## 2024-02-13 DIAGNOSIS — Z00.6 RESEARCH STUDY PATIENT: ICD-10-CM

## 2024-02-13 DIAGNOSIS — E83.42 HYPOMAGNESEMIA: Primary | ICD-10-CM

## 2024-02-13 DIAGNOSIS — I48.91 ATRIAL FIBRILLATION, UNSPECIFIED TYPE (H): Primary | ICD-10-CM

## 2024-02-13 DIAGNOSIS — Z94.2 S/P LUNG TRANSPLANT (H): ICD-10-CM

## 2024-02-13 DIAGNOSIS — R76.8 LOW IMMUNOGLOBULIN LEVEL: ICD-10-CM

## 2024-02-13 LAB
6 MIN WALK (FT): 1360 FT
6 MIN WALK (M): 415 M
ALBUMIN SERPL BCG-MCNC: 4.4 G/DL (ref 3.5–5.2)
ALP SERPL-CCNC: 62 U/L (ref 40–150)
ALT SERPL W P-5'-P-CCNC: 30 U/L (ref 0–70)
ANION GAP SERPL CALCULATED.3IONS-SCNC: 10 MMOL/L (ref 7–15)
AST SERPL W P-5'-P-CCNC: 28 U/L (ref 0–45)
BILIRUB SERPL-MCNC: 0.3 MG/DL
BUN SERPL-MCNC: 17.3 MG/DL (ref 6–20)
CALCIUM SERPL-MCNC: 9.1 MG/DL (ref 8.6–10)
CHLORIDE SERPL-SCNC: 107 MMOL/L (ref 98–107)
CHOLEST SERPL-MCNC: 181 MG/DL
CREAT SERPL-MCNC: 1.06 MG/DL (ref 0.67–1.17)
DEPRECATED HCO3 PLAS-SCNC: 24 MMOL/L (ref 22–29)
DLCOUNC-%PRED-PRE: 59 %
DLCOUNC-PRE: 13.71 ML/MIN/MMHG
DLCOUNC-PRED: 23.22 ML/MIN/MMHG
EGFRCR SERPLBLD CKD-EPI 2021: 81 ML/MIN/1.73M2
ERV-%PRED-PRE: 43 %
ERV-PRE: 0.55 L
ERV-PRED: 1.27 L
EXPTIME-PRE: 8.22 SEC
FASTING STATUS PATIENT QL REPORTED: ABNORMAL
FEF2575-%PRED-PRE: 32 %
FEF2575-PRE: 0.81 L/SEC
FEF2575-PRED: 2.5 L/SEC
FEFMAX-%PRED-PRE: 61 %
FEFMAX-PRE: 4.9 L/SEC
FEFMAX-PRED: 8.02 L/SEC
FEV1-%PRED-PRE: 55 %
FEV1-PRE: 1.54 L
FEV1FEV6-PRE: 64 %
FEV1FEV6-PRED: 79 %
FEV1FVC-PRE: 63 %
FEV1FVC-PRED: 80 %
FEV1SVC-PRE: 58 %
FEV1SVC-PRED: 72 %
FIFMAX-PRE: 4.58 L/SEC
FRCPLETH-%PRED-PRE: 74 %
FRCPLETH-PRE: 2.43 L
FRCPLETH-PRED: 3.24 L
FVC-%PRED-PRE: 69 %
FVC-PRE: 2.43 L
FVC-PRED: 3.48 L
GLUCOSE SERPL-MCNC: 94 MG/DL (ref 70–99)
HBA1C MFR BLD: 5.7 %
HDLC SERPL-MCNC: 55 MG/DL
IC-%PRED-PRE: 83 %
IC-PRE: 2.12 L
IC-PRED: 2.54 L
INR PPP: 2.64 (ref 0.85–1.15)
LDLC SERPL CALC-MCNC: 80 MG/DL
MAGNESIUM SERPL-MCNC: 1.5 MG/DL (ref 1.7–2.3)
NONHDLC SERPL-MCNC: 126 MG/DL
PHOSPHATE SERPL-MCNC: 2.2 MG/DL (ref 2.5–4.5)
POTASSIUM SERPL-SCNC: 3.9 MMOL/L (ref 3.4–5.3)
PROT SERPL-MCNC: 6.9 G/DL (ref 6.4–8.3)
RVPLETH-%PRED-PRE: 85 %
RVPLETH-PRE: 1.88 L
RVPLETH-PRED: 2.19 L
SODIUM SERPL-SCNC: 141 MMOL/L (ref 135–145)
TLCPLETH-%PRED-PRE: 76 %
TLCPLETH-PRE: 4.55 L
TLCPLETH-PRED: 5.91 L
TRIGL SERPL-MCNC: 229 MG/DL
VA-%PRED-PRE: 64 %
VA-PRE: 3.41 L
VC-%PRED-PRE: 68 %
VC-PRE: 2.67 L
VC-PRED: 3.88 L
VIT D+METAB SERPL-MCNC: 30 NG/ML (ref 20–50)

## 2024-02-13 PROCEDURE — 86352 CELL FUNCTION ASSAY W/STIM: CPT | Performed by: INTERNAL MEDICINE

## 2024-02-13 PROCEDURE — 84100 ASSAY OF PHOSPHORUS: CPT | Performed by: PATHOLOGY

## 2024-02-13 PROCEDURE — 99215 OFFICE O/P EST HI 40 MIN: CPT | Mod: 25 | Performed by: INTERNAL MEDICINE

## 2024-02-13 PROCEDURE — 85610 PROTHROMBIN TIME: CPT | Performed by: PATHOLOGY

## 2024-02-13 PROCEDURE — 93971 EXTREMITY STUDY: CPT | Mod: LT | Performed by: STUDENT IN AN ORGANIZED HEALTH CARE EDUCATION/TRAINING PROGRAM

## 2024-02-13 PROCEDURE — 80061 LIPID PANEL: CPT | Performed by: PATHOLOGY

## 2024-02-13 PROCEDURE — 99417 PROLNG OP E/M EACH 15 MIN: CPT | Performed by: INTERNAL MEDICINE

## 2024-02-13 PROCEDURE — 71046 X-RAY EXAM CHEST 2 VIEWS: CPT | Performed by: RADIOLOGY

## 2024-02-13 PROCEDURE — 36415 COLL VENOUS BLD VENIPUNCTURE: CPT | Performed by: PATHOLOGY

## 2024-02-13 PROCEDURE — 99213 OFFICE O/P EST LOW 20 MIN: CPT | Performed by: INTERNAL MEDICINE

## 2024-02-13 PROCEDURE — 82306 VITAMIN D 25 HYDROXY: CPT | Performed by: INTERNAL MEDICINE

## 2024-02-13 PROCEDURE — 94375 RESPIRATORY FLOW VOLUME LOOP: CPT | Performed by: INTERNAL MEDICINE

## 2024-02-13 PROCEDURE — 94618 PULMONARY STRESS TESTING: CPT | Performed by: INTERNAL MEDICINE

## 2024-02-13 PROCEDURE — 83036 HEMOGLOBIN GLYCOSYLATED A1C: CPT | Performed by: INTERNAL MEDICINE

## 2024-02-13 PROCEDURE — 94729 DIFFUSING CAPACITY: CPT | Performed by: INTERNAL MEDICINE

## 2024-02-13 PROCEDURE — 99000 SPECIMEN HANDLING OFFICE-LAB: CPT | Performed by: PATHOLOGY

## 2024-02-13 PROCEDURE — 84403 ASSAY OF TOTAL TESTOSTERONE: CPT | Performed by: INTERNAL MEDICINE

## 2024-02-13 PROCEDURE — 94726 PLETHYSMOGRAPHY LUNG VOLUMES: CPT | Performed by: INTERNAL MEDICINE

## 2024-02-13 PROCEDURE — 83735 ASSAY OF MAGNESIUM: CPT | Performed by: INTERNAL MEDICINE

## 2024-02-13 PROCEDURE — 86833 HLA CLASS II HIGH DEFIN QUAL: CPT | Performed by: INTERNAL MEDICINE

## 2024-02-13 PROCEDURE — 87799 DETECT AGENT NOS DNA QUANT: CPT | Performed by: INTERNAL MEDICINE

## 2024-02-13 PROCEDURE — 86832 HLA CLASS I HIGH DEFIN QUAL: CPT | Performed by: INTERNAL MEDICINE

## 2024-02-13 PROCEDURE — 80053 COMPREHEN METABOLIC PANEL: CPT | Performed by: PATHOLOGY

## 2024-02-13 ASSESSMENT — PATIENT HEALTH QUESTIONNAIRE - PHQ9: SUM OF ALL RESPONSES TO PHQ QUESTIONS 1-9: 10

## 2024-02-13 ASSESSMENT — PAIN SCALES - GENERAL: PAINLEVEL: NO PAIN (0)

## 2024-02-13 NOTE — PATIENT INSTRUCTIONS
Patient Instructions  1. Continue to hydrate with 60-70 oz fluids daily.  2. Continue to exercise daily or most days of the week.  3. Follow up with your primary care provider for annual gender health maintenance procedures.  4. Follow up with colonoscopy schedule.  5. Follow up with annual dermatology visits.  6. It doesn't seem like the COVID vaccine is working well in lung transplant patients. A number of lung transplant patients have gotten sick with COVID even after receiving the vaccines. Based on our recent experience, it can be life-threatening to get COVID  even after being vaccinated. Please continue to act like you did not get the COVID vaccine - social distancing, wearing a mask, good hand hygiene, etc. If the people around you are vaccinated, it will help reduce the risk of you getting COVID. All members of your household should be vaccinated.    7. Keep using your BIPAP at Night as much as you can  8. We will get an Ultrasound of your Left leg to make sure there are no blood clots. Please go to Imaging after this appointment.    Next transplant clinic appointment:  4 months with CXR, labs and PFTs  Next lab draw: recheck IGG in 2 weeks to determine if we need more IVIG infusions  and CBC with platelets level ~Monday 2/26/24  ~~~~~~~~~~~~~~~~~~~~~~~~~    Thoracic Transplant Office phone [phone] (alt [phone]), fax [phone]    Office Hours 8:30 - 5:00     For after-hours urgent issues, please dial [phone] (alt [phone]) and ask the  to page the Thoracic Transplant Coordinator On-Call.   --------------------  To expedite your medication refill(s), please contact your pharmacy and have them fax a refill request to: [phone]    *Please allow 3 business days for routine medication refills.  *Please allow 5 business days for controlled substance medication refills.    **For Diabetic medications and supplies refill(s), please contact your pharmacy and have them contact your  Endocrine team.  --------------------  For scheduling appointments call [phone] (alt [phone])  --------------------  Please Note: If you are active on [employer], all future test results will be sent by [employer] message only, and will no longer be called to patient. You may also receive communication directly from your physician.

## 2024-02-13 NOTE — PROGRESS NOTES
ANTICOAGULATION MANAGEMENT     Edson Thornton Jr. 58 year old male is on warfarin with therapeutic INR result. (Goal INR 2.0-3.0)    Recent labs: (last 7 days)     02/13/24  0802   INR 2.64*       ASSESSMENT     Source(s): Chart Review and Patient/Caregiver Call     Warfarin doses taken: Warfarin taken as instructed  Diet: No new diet changes identified  Medication/supplement changes: None noted  New illness, injury, or hospitalization: No  Signs or symptoms of bleeding or clotting: No  Previous result: Therapeutic last 2(+) visits  Additional findings: None       PLAN     Recommended plan for no diet, medication or health factor changes affecting INR     Dosing Instructions: Continue your current warfarin dose with next INR in 2 weeks       Summary  As of 2/13/2024      Full warfarin instructions:  4 mg every Sun; 5 mg all other days   Next INR check:  2/26/2024               Telephone call with Bret who agrees to plan and repeated back plan correctly    Patient using outside facility for labs    Education provided:   Goal range and lab monitoring: goal range and significance of current result and Importance of following up at instructed interval  Contact [phone] with any changes, questions or concerns.     Plan made per [facility] anticoagulation protocol    ESSIE COLÓN RN  Anticoagulation Clinic  2/13/2024    _______________________________________________________________________     Anticoagulation Episode Summary       Current INR goal:  2.0-3.0   TTR:  72.7% (11.7 mo)   Target end date:  Indefinite   Send INR reminders to:  [facility]    Indications    Atrial fibrillation  unspecified type (H) [I48.91]  Encounter for long-term (current) use of high-risk medication [Z79.899]             Comments:               Anticoagulation Care Providers       Provider Role Specialty Phone number    Abiodun Woods MD Referring Pulmonary Disease [phone]

## 2024-02-13 NOTE — NURSING NOTE
Transplant Coordinator Note    Reason for visit: Post lung transplant follow up visit   Coordinator: Present in person  Caregiver:  Peg GARCIA)    Health concerns addressed today:  1. Recently in ED Rhinovirus/after IVIG. Lasted about a day of nausea. Went away on its own  2. BP: 138/84 today   3. Breathing: congestion coughing up a bunch of stuff. Mostly in the morning and evenings. Coughing up yellow/clear sputum; Comes and goes on its own. No chest pains. No fevers or other infectious symptoms. Regular night sweats have had this for quite awhile. Mainly after transplant. Mainly damp sometimes wake up in sweats. Breathing is comfortable sitting, some increase in breathing with activity, getting better now. PFTs stable. CXR stable. 6 min walk improved from last time (sats dipped a little bit but nothing concerning).   4. Appetite is okay about the same. Weight is pretty stable. GI good, having regular bowel movements.  5. Runny nose: clear, no other sinus symptoms.  6. Lots of bruising. From warfarin.     Activity/rehab: up ad [place], walking around the house, going to the gym every Sunday (treadmill 30-45 min) 2.5 miles per hour. 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 Do a lot of walking at my job.   Oxygen needs: room air, BIPAP NOC (uses off and on)  Pain management/RX: joint/bone pain managed by Rheumatology back of the left leg behind knee, no swelling couple of weeks has been going on.   Diabetic management: on lantus occasional novalog (haven't needed this for a long time, checking BG TID)  Next Bronch due:     Risk Criteria Labs:   CMV status: D-/R-  Valcyte stopped:   EBV status:D+/R+  AC/asa: on coumadin  PJP prophylactic: Bactrim    COVID:  COVID-19 infection (yes/no, date of most recent positive test):   Status/instructions given about COVID-19 vaccine:     Pt Education: medications (use/dose/side effects), how/when to call coordinator, frequency of labs, s/s of infection/rejection, call  prior to starting any new medications, lab/vital sign book    Health Maintenance:   Last colonoscopy:   Next colonoscopy due:   Dermatology:  Vaccinations this visit:     Labs, CXR, PFTs reviewed with patient  Medication record reviewed and reconciled  Questions and concerns addressed    Patient Instructions  1. Continue to hydrate with 60-70 oz fluids daily.  2. Continue to exercise daily or most days of the week.  3. Follow up with your primary care provider for annual gender health maintenance procedures.  4. Follow up with colonoscopy schedule.  5. Follow up with annual dermatology visits.  6. It doesn't seem like the COVID vaccine is working well in lung transplant patients. A number of lung transplant patients have gotten sick with COVID even after receiving the vaccines. Based on our recent experience, it can be life-threatening to get COVID  even after being vaccinated. Please continue to act like you did not get the COVID vaccine - social distancing, wearing a mask, good hand hygiene, etc. If the people around you are vaccinated, it will help reduce the risk of you getting COVID. All members of your household should be vaccinated.    7. Keep using your BIPAP at Night as much as you can  8. We will get an Ultrasound of your Left leg to make sure there are no blood clots. Please go to Imaging after this appointment.    Next transplant clinic appointment:  4 months with CXR, labs and PFTs  Next lab draw: recheck IGG, tacrolimus and CBC with platelets in 2 weeks to determine if we need more IVIG infusions     ~Monday 2/26/24    AVS printed at time of check out

## 2024-02-13 NOTE — LETTER
2/13/2024         RE: Edson Thornton Jr.  [address]  [place] [address]        Dear Colleague,    Thank you for referring your patient, Edson Thornton Jr., to the [facility]. Please see a copy of my visit note below.    Reason for Visit  Edson Thornton Jr. is a 58 year old year old male who is being seen for RECHECK (3 month follow up. Pain in left leg behind knee cap)      Assessment and plan:   Edson Thornton is a 58 year old male with NSIP/ILD, bronchiectasis, moderate PH, RA, SALTY, chronic HSV infection, hypogammaglobulinemia, steroid-induced diabetes, hypothyroidism, PFO, HTN, HLD, duodenal anomaly, anxiety, and depression. Admitted on 9/5/21 from OSH for acute on chronic respiratory failure 2/2 ILD exacerbation now s/p BSLT on 10/16/21. Prolonged vent wean s/p trach and PEG/J tube.  Post-op course also complicated by encephalopathy and diffuse weakness, acute to subacute CVA, afib with RVR, BRENNAN, GI bleed, Candidemia/Candida empyema, and anxiety. Code called 11/2 for bradycardia/asystole, progressive hypotension, found to have significant GI bleed, EGD with adherent clot near PEG tube site that was clipped.  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 His exercise tolerance and PFTs have not reached the level expected.      Pulmonary/lung transplant: The patient reports good exercise tolerance.  Oxygenation adequate at 97% at rest with no significant desaturation on 6-minute walk.  PFTs with moderate obstructive ventilatory defect, similar to recent baseline.  Chest x-ray, reviewed by me with mildly elevated right hemidiaphragm and blunting of the right costophrenic angle, unchanged from previous, no acute infiltrate and no change from previous.  The patient is stable from a pulmonary standpoint.  PFTs and exercise tolerance have  never reached the level expected following lung transplantation, likely due to the multiple complications during his initial posttransplant course.  Continue current Myfortic and 180 mg twice daily, previously reduced for recurrent infections, (change from CellCept due to diarrhea).  Continue current prednisone.  Tacrolimus will be adjusted to maintain a level of 8-10.         Positive crossmatch/DSA: The patient had a retrospective positive crossmatch following transplantation, attributed to rituximab received in June 2022.  The patient did have a positive DQ B5 with a relatively low MFI in the past, this has been absent in April, August and November of last year.      Date DSA mfi Biopsy (date) Treatment   10/17/2021  none         10/23/2021  none         11/1/2021  none         11/15/2021  none         11/29/2021  DQ B5  2522       12/6/2021  DQ B5  1873       12/12/2021  DQ B5  1703       12/27/2021  DQ B5  3924       1/3/2022  DQ B5  3072       1/10/2022  DQ B5  4032       1/17/2022  DQB5  1849       2/3/2022 DQB5   2488       2/7/2022 DQB5  1874       2/10/2022 DQB5  1781       3/15/2022 DQB5  2254       3/21/2022 DQB5  2117       4/18/2022 DQB5   908       6/20/2022  DQB5  1100       6/29/2022  DQB5  1411       9/12/2022 DQB5  1681       11/14/2022 DQB5  891       12/20/2022  DQB5  1553       3/8/2023  DQB5  551       4/25/2023 DQB5 None       8/1/2023 DQB5 None       11/14/23 DQB5 None           Aspergillus/fungal infection: The patient was treated with a course of voriconazole, discontinued in mid November.  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  Chest CT was obtained and reviewed by me with no findings suggestive of active fungal infection so antifungals were not reinitiated.    Left thigh pain: History of PE.  Check left lower extremity Doppler to rule out DVT.    CKD: The patient has a history of stage IIIa CKD.  Creatinine has since normalized.  Continue  monitoring.     COVID: COVID infection at the beginning of the November 2023.  The patient has completed remdesivir.  Symptoms have resolved and PFTs have returned to baseline.     Prophylaxis: Continue Bactrim for PJP and nocardia.  CMV -/-.  Continue monitoring.     Hypogammaglobulinemia: The patient has received monthly IgG replacement for hypogammaglobulinemia and recurrent respiratory infections.  He last received immunoglobulin on 1/24.  IgG will be rechecked in late February and replacement will be pursued if less than 400.     Ophthalmology: History of double vision and visual loss at the time of transplant.  Defer to ophthalmology for ongoing follow-up.     Rheumatoid arthritis: Plaquenil was held due to interaction with remdesivir but has since been reinitiated.  RA pain adequately controlled with current Plaquenil.     Atrial fibrillation with RVR: The patient appears to be in sinus rhythm on exam today.  Continue propranolol and warfarin.     Obstructive sleep apnea: The patient is inconsistent with his BiPAP use.  He was reminded of the importance of BiPAP for reduction in hypertension and heart disease.     Hypertension: Amlodipine was increased at last visit.  Blood pressure adequately controlled with current amlodipine and propranolol.     Depression/anxiety: Followed by psychiatry locally.  Adequately controlled with current Lamictal, Prozac and Remeron.    Urology: Continue finasteride and tamsulosin per urology recommendations.      Reflux: Adequately controlled with current pantoprazole and famotidine.     Steroid-induced diabetes: Currently requiring infrequent correction scale insulin.     Hypothyroidism: Continue levothyroxine.     Multiple acute/subacute ischemic strokes: etiology likely embolic vs perioperative. MRI brain with infarcts noted in both cerebral hemispheres, left cerebellum, presumed associated with new Afib vs perioperative. Bilateral upper extremity paresis (R>L), suspicion  "for some cortical blindness. Continue warfarin, HTN and BS control and rosuvastatin.     Bone health: Continue Reclast per endocrinology.          Healthcare maintenance: The patient has received his influenza vaccine for this flu season.  He is due for his next COVID \"booster\".  Annual studies were completed today and will be due again next February.    Last colonoscopy: 7/21/2020    I, Abiodun Woods, have spent 65 minutes on the day of the visit to review the chart, interview and examine the patient, review labs and imaging, formulate a plan, document and submit orders. Time documented is excluding time spent for PFT interpretation.  The longitudinal plan of care for the condition(s) below were addressed during this visit. Due to the added complexity in care, I will continue to support Herrick in the subsequent management of this condition(s) and with the ongoing continuity of care of this condition(s).    Problem List Items Addressed This Visit as of 2/13/2024      S/P lung transplant (H) (Chronic)    Encounter for long-term (current) use of high-risk medication    Immunosuppressed status (H24)    Primary hypertension    Hypomagnesemia - Primary    Low immunoglobulin level             Abiodun Woods MD     Lung TX HPI  Transplants:  10/16/2021 (Lung), Postoperative day:  850    The patient was seen and examined by Abiodun Woods MD   Edson Thornton is a 58 year old male with NSIP/ILD, bronchiectasis, moderate PH, RA, SALTY, chronic HSV infection, hypogammaglobulinemia, steroid-induced diabetes, hypothyroidism, PFO, HTN, HLD, duodenal anomaly, anxiety, and depression. Admitted on 9/5/21 from OSH for acute on chronic respiratory failure 2/2 ILD exacerbation now s/p BSLT on 10/16/21. Prolonged vent wean s/p trach and PEG/J tube.  Post-op course also complicated by encephalopathy and diffuse weakness, acute to subacute CVA, afib with RVR, BRENNAN, GI bleed, Candidemia/Candida empyema, and anxiety. Code " called 11/2 for bradycardia/asystole, progressive hypotension, found to have significant GI bleed, EGD with adherent clot near PEG tube site that was clipped.  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 His exercise tolerance and PFTs have not reached the level expected.     Since his last visit, patient developed cough and sputum production in early January.  He was treated with levofloxacin.  Subsequently nasal swab revealed rhinovirus.  Symptoms resolved.  On 1/25 the patient was seen in the local emergency room with nausea, vomiting, weakness and headache.  This is 1 day following IV immunoglobulin replacement.  Evaluation was unrevealing and symptoms resolved without intervention.      Breathing is comfortable at rest.  Dyspnea with moderate exertion, unchanged from baseline.  He does note some increase in dyspnea on exertion during periods of respiratory infection which resolved with resolution.  The patient reports persistent chest congestion mostly in the morning and evening.  Cough productive of yellow secretions.  Cough and sputum will wax and wane without specific intervention.  No chest pain.  No hemoptysis.  No fever or chills.  He does note longstanding mild night sweats which are unchanged.  The patient exercises on the treadmill once per week for 30-45 minutes at 2.5 mph.  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  He reports that this is a fairly active position.    Review of systems:  Appetite is good  Clear rhinorrhea  No nausea, vomiting, diarrhea or abdominal pain  Left thigh pain in the past 2 weeks.  No known injury.  No lower extremity edema.  A complete ROS was otherwise negative except as noted in the HPI.    Current Outpatient Medications   Medication     acetaminophen (TYLENOL) 325 MG tablet     amLODIPine (NORVASC)  10 MG tablet     Calcium Carbonate-Vitamin D (CALCIUM 600 +D HIGH POTENCY) 600-10 MG-MCG TABS     diclofenac (VOLTAREN) 1 % topical gel     famotidine (PEPCID) 20 MG tablet     ferrous sulfate (FEROSUL) 325 (65 Fe) MG tablet     finasteride (PROSCAR) 5 MG tablet     FLUoxetine (PROZAC) 20 MG capsule     fluticasone-vilanterol (BREO ELLIPTA) 200-25 MCG/ACT inhaler     hydroxychloroquine (PLAQUENIL) 200 MG tablet     insulin aspart (NOVOLOG PEN) 100 UNIT/ML pen     lamoTRIgine (LAMICTAL) 25 MG tablet     levalbuterol (XOPENEX HFA) 45 MCG/ACT inhaler     levothyroxine (SYNTHROID/LEVOTHROID) 25 MCG tablet     magnesium glycinate 100 MG CAPS capsule     mirtazapine (REMERON) 7.5 MG tablet     multivitamin w/minerals (THERA-VIT-M) tablet     mycophenolic acid (GENERIC EQUIVALENT) 180 MG EC tablet     ondansetron (ZOFRAN-ODT) 4 MG ODT tab     pantoprazole (PROTONIX) 40 MG EC tablet     predniSONE (DELTASONE) 2.5 MG tablet     predniSONE (DELTASONE) 5 MG tablet     propranolol (INDERAL) 20 MG tablet     rOPINIRole (REQUIP) 0.25 MG tablet     rosuvastatin (CRESTOR) 10 MG tablet     sulfamethoxazole-trimethoprim (BACTRIM) 400-80 MG tablet     tacrolimus (GENERIC) 1 MG capsule     tamsulosin (FLOMAX) 0.4 MG capsule     warfarin ANTICOAGULANT (COUMADIN) 1 MG tablet     warfarin ANTICOAGULANT (COUMADIN) 1 MG tablet     insulin pen needle (32G X 4 MM) 32G X 4 MM miscellaneous     senna-docusate (SENOKOT-S/PERICOLACE) 8.6-50 MG tablet     tacrolimus (GENERIC) 0.5 MG capsule     No current facility-administered medications for this visit.     No Known Allergies  Past Medical History:   Diagnosis Date     BRENNAN (acute kidney injury) (H24) 10/17/2021     Anxiety      Aspergillus (H) 03/08/2023     Depression      HLD (hyperlipidemia)      HTN (hypertension)      Hypothyroidism      ILD (interstitial lung disease) (H)      Infection due to 2019 novel coronavirus 03/2023     Lung infection 07/12/2023     SALTY on CPAP      Oxygen dependent      BL 4L since ~6/2021     Primary hypertension 02/28/2022     Rheumatoid arthritis (H)     signs ~5/2020, dx 5/2021     S/P lung transplant (H) 10/16/2021     Shock liver 10/17/2021     Steroid-induced hyperglycemia      Traction bronchiectasis (H)        Past Surgical History:   Procedure Laterality Date     BRONCHOSCOPY (RIGID OR FLEXIBLE), DIAGNOSTIC N/A 1/26/2022    Procedure: BRONCHOSCOPY, WITH BRONCHOALVEOLAR LAVAGE AND BIOPSY;  Surgeon: Perlman, David Morris, MD;  Location:  GI     BRONCHOSCOPY (RIGID OR FLEXIBLE), DIAGNOSTIC N/A 4/19/2022    Procedure: BRONCHOSCOPY, WITH BRONCHOALVEOLAR LAVAGE AND BIOPSY;  Surgeon: Sherine Merrill MD;  Location:  GI     BRONCHOSCOPY (RIGID OR FLEXIBLE), DIAGNOSTIC N/A 6/21/2022    Procedure: BRONCHOSCOPY, WITH BRONCHOALVEOLAR LAVAGE AND BIOPSY;  Surgeon: Aneesh Rubio MD;  Location:  GI     BRONCHOSCOPY FLEXIBLE AND RIGID N/A 11/15/2023    Procedure: Surveillance Bronchoscopy with airway check;  Surgeon: Ron Daniels MD;  Location:  GI     COLONOSCOPY W/ BIOPSIES AND POLYPECTOMY  07/21/2020     CV CORONARY ANGIOGRAM N/A 09/08/2021    Procedure: Coronary Angiogram with possible intervention;  Surgeon: Jovon Bullock MD;  Location:  HEART CARDIAC CATH LAB     CV RIGHT HEART CATH MEASUREMENTS RECORDED N/A 09/08/2021    Procedure: Right Heart Cath;  Surgeon: Jovon Bullock MD;  Location:  HEART CARDIAC CATH LAB     ESOPHAGOSCOPY, GASTROSCOPY, DUODENOSCOPY (EGD), COMBINED N/A 10/23/2021    Procedure: ESOPHAGOGASTRODUODENOSCOPY (EGD);  Surgeon: Miquel Pisano MD;  Location:  GI     ESOPHAGOSCOPY, GASTROSCOPY, DUODENOSCOPY (EGD), COMBINED N/A 11/02/2021    Procedure: ESOPHAGOGASTRODUODENOSCOPY (EGD);  Surgeon: Daniel Ortiz MD;  Location:  GI     ESOPHAGOSCOPY, GASTROSCOPY, DUODENOSCOPY (EGD), COMBINED N/A 11/5/2021    Procedure: ESOPHAGOGASTRODUODENOSCOPY (EGD);  Surgeon: Ronnell Hernandez MD;  [facility]      EXTRACTION(S) DENTAL N/A 09/22/2021    Procedure: EXTRACTION tooth #19;  Surgeon: Deepak Tobin DDS;  Location: [facility] OR     HERNIA REPAIR       IR CHEST TUBE PLACEMENT NON-TUNNELLED LEFT  11/03/2021     PICC TRIPLE LUMEN PLACEMENT Left 11/04/2021    5FR TL PICC. Right non occlusive thrombus subclavian vein.     REPLACE GASTROJEJUNOSTOMY TUBE, PERCUTANEOUS N/A 11/9/2021    Procedure: REPLACEMENT, GASTROJEJUNOSTOMY TUBE, PERCUTANEOUS;  Surgeon: Hernando Rodriguez MD;  Location: [facility] GI     right acl       TRACHEOSTOMY PERCUTANEOUS N/A 10/29/2021    Procedure: Percutaneous Tracheostomy,;  Surgeon: Celine Jenkins MD;  Location: [facility] OR     TRANSPLANT LUNG RECIPIENT SINGLE X2 Bilateral 10/16/2021    Procedure: TRANSPLANT, LUNG, RECIPIENT, BILATERAL, Bronchoscopy, on-pump perfusion, bilateral clamshell sternotomy;  Surgeon: Yanick Corral MD;  Location: [facility] OR     XR ACROMIOCLAVICULAR JOINT BILATERAL         Social History     Socioeconomic History     Marital status: Single     Spouse name: Not on file     Number of children: Not on file     Years of education: Not on file     Highest education level: Not on file   Occupational History     Not on file   Tobacco Use     Smoking status: Never     Smokeless tobacco: Never   Substance and Sexual Activity     Alcohol use: Never     Drug use: Never     Sexual activity: Not on file   Other Topics Concern     Parent/sibling w/ CABG, MI or angioplasty before 65F 55M? Not Asked   Social History Narrative    Full time  for the [employer] starting May 24, 2023.     Social Determinants of Health     Financial Resource Strain: Not on file   Food Insecurity: Not on file   Transportation Needs: Not on file   Physical Activity: Not on file   Stress: Not on file   Social Connections: Not on file   Interpersonal Safety: Not on file   Housing Stability: Not on file         /84 (BP Location: Right arm, Patient Position: Sitting, Cuff Size: Adult Regular)    Pulse 58   Wt 70.3 kg (155 lb)   SpO2 96%   BMI 26.61 kg/m    Body mass index is 26.61 kg/m .  Exam:   GENERAL APPEARANCE: Well developed, well nourished, alert, and in no apparent distress.  EYES: PERRL, EOMI  HENT: Nasal mucosa with no edema and no hyperemia. No nasal polyps.  EARS: Canals clear, TMs normal  MOUTH: Oral mucosa is moist, without any lesions, no tonsillar enlargement, no oropharyngeal exudate.  NECK: supple, no masses, no thyromegaly.  LYMPHATICS: No significant axillary, cervical, or supraclavicular nodes.  RESP: normal percussion, diminished airflow at the right base otherwise good airflow throughout.  Minimal right basilar crackles. No rhonchi. No wheezes.  CV: Normal S1, S2, regular rhythm, normal rate. No murmur.  No rub. No gallop. No LE edema.   ABDOMEN:  Bowel sounds normal, soft, nontender, no HSM or masses.   MS: extremities normal. (+) clubbing. No cyanosis.  SKIN: no rash on limited exam  NEURO: Mentation intact, speech normal, normal strength and tone, normal gait and stance  PSYCH: mentation appears normal. and affect normal/bright  Results:  Recent Results (from the past 168 hour(s))   6 minute walk test    Collection Time: 02/13/24 12:00 AM   Result Value Ref Range    6 min walk (FT) 1,360 1,337 ft    6 Min Walk (M) 415 408 m   INR    Collection Time: 02/13/24  8:02 AM   Result Value Ref Range    INR 2.64 (H) 0.85 - 1.15   Vitamin D Deficiency    Collection Time: 02/13/24  8:02 AM   Result Value Ref Range    Vitamin D, Total (25-Hydroxy) 30 20 - 50 ng/mL   Lipid Profile    Collection Time: 02/13/24  8:02 AM   Result Value Ref Range    Cholesterol 181 <200 mg/dL    Triglycerides 229 (H) <150 mg/dL    Direct Measure HDL 55 >=40 mg/dL    LDL Cholesterol Calculated 80 <=100 mg/dL    Non HDL Cholesterol 126 <130 mg/dL    Patient Fasting > 8hrs? Unknown    Hemoglobin A1c    Collection Time: 02/13/24  8:02 AM   Result Value Ref Range    Hemoglobin A1C 5.7 (H) <5.7 %   Comprehensive  metabolic panel    Collection Time: 02/13/24  8:02 AM   Result Value Ref Range    Sodium 141 135 - 145 mmol/L    Potassium 3.9 3.4 - 5.3 mmol/L    Carbon Dioxide (CO2) 24 22 - 29 mmol/L    Anion Gap 10 7 - 15 mmol/L    Urea Nitrogen 17.3 6.0 - 20.0 mg/dL    Creatinine 1.06 0.67 - 1.17 mg/dL    GFR Estimate 81 >60 mL/min/1.73m2    Calcium 9.1 8.6 - 10.0 mg/dL    Chloride 107 98 - 107 mmol/L    Glucose 94 70 - 99 mg/dL    Alkaline Phosphatase 62 40 - 150 U/L    AST 28 0 - 45 U/L    ALT 30 0 - 70 U/L    Protein Total 6.9 6.4 - 8.3 g/dL    Albumin 4.4 3.5 - 5.2 g/dL    Bilirubin Total 0.3 <=1.2 mg/dL   Phosphorus    Collection Time: 02/13/24  8:02 AM   Result Value Ref Range    Phosphorus 2.2 (L) 2.5 - 4.5 mg/dL   Magnesium    Collection Time: 02/13/24  8:02 AM   Result Value Ref Range    Magnesium 1.5 (L) 1.7 - 2.3 mg/dL   General PFT Lab (Please always keep checked)    Collection Time: 02/13/24  8:10 AM   Result Value Ref Range    FVC-Pred 3.48 L    FVC-Pre 2.43 L    FVC-%Pred-Pre 69 %    FEV1-Pre 1.54 L    FEV1-%Pred-Pre 55 %    FEV1FVC-Pred 80 %    FEV1FVC-Pre 63 %    FEFMax-Pred 8.02 L/sec    FEFMax-Pre 4.90 L/sec    FEFMax-%Pred-Pre 61 %    FEF2575-Pred 2.50 L/sec    FEF2575-Pre 0.81 L/sec    YCI4158-%Pred-Pre 32 %    ExpTime-Pre 8.22 sec    FIFMax-Pre 4.58 L/sec    VC-Pred 3.88 L    VC-Pre 2.67 L    VC-%Pred-Pre 68 %    IC-Pred 2.54 L    IC-Pre 2.12 L    IC-%Pred-Pre 83 %    ERV-Pred 1.27 L    ERV-Pre 0.55 L    ERV-%Pred-Pre 43 %    FEV1FEV6-Pred 79 %    FEV1FEV6-Pre 64 %    FRCPleth-Pred 3.24 L    FRCPleth-Pre 2.43 L    FRCPleth-%Pred-Pre 74 %    RVPleth-Pred 2.19 L    RVPleth-Pre 1.88 L    RVPleth-%Pred-Pre 85 %    TLCPleth-Pred 5.91 L    TLCPleth-Pre 4.55 L    TLCPleth-%Pred-Pre 76 %    DLCOunc-Pred 23.22 ml/min/mmHg    DLCOunc-Pre 13.71 ml/min/mmHg    DLCOunc-%Pred-Pre 59 %    VA-Pre 3.41 L    VA-%Pred-Pre 64 %    FEV1SVC-Pred 72 %    FEV1SVC-Pre 58 %       Results as noted above.    PFT  Interpretation:  Moderate obstructive ventilatory defect.  Increased from previous.  Similar to recent best.  Valid Maneuver  Mildly reduced total lung capacity suggestive of a restrictive component as well, not significantly changed from previous.  Normal residual volume.  Moderately reduced diffusing capacity, unchanged from previous.    On 6-minute walk testing, the patient walked 1360 feet (lower limit of normal 1337 feet) oxygen saturation dropped from 97 to 94%.  Walk distance improved from previous.                Again, thank you for allowing me to participate in the care of your patient.        Sincerely,        Abiodun Woods MD

## 2024-02-13 NOTE — PROGRESS NOTES
Edson Thornton Jr. comes into clinic today at the request of Dr Woods Ordering Provider for PFT        ALEK HAYES

## 2024-02-13 NOTE — NURSING NOTE
Chief Complaint   Patient presents with    RECHECK     3 month follow up. Pain in right leg behind knee cap     /84 (BP Location: Right arm, Patient Position: Sitting, Cuff Size: Adult Regular)   Pulse 58   Wt 70.3 kg (155 lb)   SpO2 96%   BMI 26.61 kg/m    Jennifer Medel on 2/13/2024 at 9:09 AM

## 2024-02-13 NOTE — PROGRESS NOTES
Edson Thornton Jr. comes into clinic today at the request of Dr Woods Ordering Provider for 6 minute walk        ALEK HAEYS

## 2024-02-13 NOTE — PROGRESS NOTES
Reason for Visit  Edson Thornton Jr. is a 58 year old year old male who is being seen for RECHECK (3 month follow up. Pain in left leg behind knee cap)      Assessment and plan:   Edson Thornton is a 58 year old male with NSIP/ILD, bronchiectasis, moderate PH, RA, SALTY, chronic HSV infection, hypogammaglobulinemia, steroid-induced diabetes, hypothyroidism, PFO, HTN, HLD, duodenal anomaly, anxiety, and depression. Admitted on 9/5/21 from OSH for acute on chronic respiratory failure 2/2 ILD exacerbation now s/p BSLT on 10/16/21. Prolonged vent wean s/p trach and PEG/J tube.  Post-op course also complicated by encephalopathy and diffuse weakness, acute to subacute CVA, afib with RVR, BRENNAN, GI bleed, Candidemia/Candida empyema, and anxiety. Code called 11/2 for bradycardia/asystole, progressive hypotension, found to have significant GI bleed, EGD with adherent clot near PEG tube site that was clipped.  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 His exercise tolerance and PFTs have not reached the level expected.      Pulmonary/lung transplant: The patient reports good exercise tolerance.  Oxygenation adequate at 97% at rest with no significant desaturation on 6-minute walk.  PFTs with moderate obstructive ventilatory defect, similar to recent baseline.  Chest x-ray, reviewed by me with mildly elevated right hemidiaphragm and blunting of the right costophrenic angle, unchanged from previous, no acute infiltrate and no change from previous.  The patient is stable from a pulmonary standpoint.  PFTs and exercise tolerance have never reached the level expected following lung transplantation, likely due to the multiple complications during his initial posttransplant course.  Continue current Myfortic and 180 mg twice daily, previously reduced for recurrent infections, (change from CellCept  due to diarrhea).  Continue current prednisone.  Tacrolimus will be adjusted to maintain a level of 8-10.         Positive crossmatch/DSA: The patient had a retrospective positive crossmatch following transplantation, attributed to rituximab received in June 2022.  The patient did have a positive DQ B5 with a relatively low MFI in the past, this has been absent in April, August and November of last year.      Date DSA mfi Biopsy (date) Treatment   10/17/2021  none         10/23/2021  none         11/1/2021  none         11/15/2021  none         11/29/2021  DQ B5  2522       12/6/2021  DQ B5  1873       12/12/2021  DQ B5  1703       12/27/2021  DQ B5  3924       1/3/2022  DQ B5  3072       1/10/2022  DQ B5  4032       1/17/2022  DQB5  1849       2/3/2022 DQB5   2488       2/7/2022 DQB5  1874       2/10/2022 DQB5  1781       3/15/2022 DQB5  2254       3/21/2022 DQB5  2117       4/18/2022 DQB5   908       6/20/2022  DQB5  1100       6/29/2022  DQB5  1411       9/12/2022 DQB5  1681       11/14/2022 DQB5  891       12/20/2022  DQB5  1553       3/8/2023  DQB5  551       4/25/2023 DQB5 None       8/1/2023 DQB5 None       11/14/23 DQB5 None           Aspergillus/fungal infection: The patient was treated with a course of voriconazole, discontinued in mid November.  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  Chest CT was obtained and reviewed by me with no findings suggestive of active fungal infection so antifungals were not reinitiated.    Left thigh pain: History of PE.  Check left lower extremity Doppler to rule out DVT.    CKD: The patient has a history of stage IIIa CKD.  Creatinine has since normalized.  Continue monitoring.     COVID: COVID infection at the beginning of the November 2023.  The patient has completed remdesivir.  Symptoms have resolved and PFTs have returned to baseline.     Prophylaxis: Continue Bactrim for PJP and nocardia.  CMV -/-.  Continue monitoring.      Hypogammaglobulinemia: The patient has received monthly IgG replacement for hypogammaglobulinemia and recurrent respiratory infections.  He last received immunoglobulin on 1/24.  IgG will be rechecked in late February and replacement will be pursued if less than 400.     Ophthalmology: History of double vision and visual loss at the time of transplant.  Defer to ophthalmology for ongoing follow-up.     Rheumatoid arthritis: Plaquenil was held due to interaction with remdesivir but has since been reinitiated.  RA pain adequately controlled with current Plaquenil.     Atrial fibrillation with RVR: The patient appears to be in sinus rhythm on exam today.  Continue propranolol and warfarin.     Obstructive sleep apnea: The patient is inconsistent with his BiPAP use.  He was reminded of the importance of BiPAP for reduction in hypertension and heart disease.     Hypertension: Amlodipine was increased at last visit.  Blood pressure adequately controlled with current amlodipine and propranolol.     Depression/anxiety: Followed by psychiatry locally.  Adequately controlled with current Lamictal, Prozac and Remeron.    Urology: Continue finasteride and tamsulosin per urology recommendations.      Reflux: Adequately controlled with current pantoprazole and famotidine.     Steroid-induced diabetes: Currently requiring infrequent correction scale insulin.     Hypothyroidism: Continue levothyroxine.     Multiple acute/subacute ischemic strokes: etiology likely embolic vs perioperative. MRI brain with infarcts noted in both cerebral hemispheres, left cerebellum, presumed associated with new Afib vs perioperative. Bilateral upper extremity paresis (R>L), suspicion for some cortical blindness. Continue warfarin, HTN and BS control and rosuvastatin.     Bone health: Continue Reclast per endocrinology.          Healthcare maintenance: The patient has received his influenza vaccine for this flu season.  He is due for his next COVID  "\"booster\".  Annual studies were completed today and will be due again next February.    Last colonoscopy: 7/21/2020    I, Abiodun Woods, have spent 65 minutes on the day of the visit to review the chart, interview and examine the patient, review labs and imaging, formulate a plan, document and submit orders. Time documented is excluding time spent for PFT interpretation.  The longitudinal plan of care for the condition(s) below were addressed during this visit. Due to the added complexity in care, I will continue to support [place] in the subsequent management of this condition(s) and with the ongoing continuity of care of this condition(s).    Problem List Items Addressed This Visit as of 2/13/2024      S/P lung transplant (H) (Chronic)    Encounter for long-term (current) use of high-risk medication    Immunosuppressed status (H24)    Primary hypertension    Hypomagnesemia - Primary    Low immunoglobulin level             Abiodun Woods MD     Lung TX HPI  Transplants:  10/16/2021 (Lung), Postoperative day:  850    The patient was seen and examined by Abiodun Woods MD   Edson Thornton is a 58 year old male with NSIP/ILD, bronchiectasis, moderate PH, RA, SALTY, chronic HSV infection, hypogammaglobulinemia, steroid-induced diabetes, hypothyroidism, PFO, HTN, HLD, duodenal anomaly, anxiety, and depression. Admitted on 9/5/21 from OSH for acute on chronic respiratory failure 2/2 ILD exacerbation now s/p BSLT on 10/16/21. Prolonged vent wean s/p trach and PEG/J tube.  Post-op course also complicated by encephalopathy and diffuse weakness, acute to subacute CVA, afib with RVR, BRENNAN, GI bleed, Candidemia/Candida empyema, and anxiety. Code called 11/2 for bradycardia/asystole, progressive hypotension, found to have significant GI bleed, EGD with adherent clot near PEG tube site that was clipped.  The patient had a positive crossmatch following transplant which was attributed to rituximab patient had received " in June 2021 but subsequently has had consistently positive DSA. His exercise tolerance and PFTs have not reached the level expected.     Since his last visit, patient developed cough and sputum production in early January.  He was treated with levofloxacin.  Subsequently nasal swab revealed rhinovirus.  Symptoms resolved.  On 1/25 the patient was seen in the local emergency room with nausea, vomiting, weakness and headache.  This is 1 day following IV immunoglobulin replacement.  Evaluation was unrevealing and symptoms resolved without intervention.      Breathing is comfortable at rest.  Dyspnea with moderate exertion, unchanged from baseline.  He does note some increase in dyspnea on exertion during periods of respiratory infection which resolved with resolution.  The patient reports persistent chest congestion mostly in the morning and evening.  Cough productive of yellow secretions.  Cough and sputum will wax and wane without specific intervention.  No chest pain.  No hemoptysis.  No fever or chills.  He does note longstanding mild night sweats which are unchanged.  The patient exercises on the treadmill once per week for 30-45 minutes at 2.5 mph.  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  He reports that this is a fairly active position.    Review of systems:  Appetite is good  Clear rhinorrhea  No nausea, vomiting, diarrhea or abdominal pain  Left thigh pain in the past 2 weeks.  No known injury.  No lower extremity edema.  A complete ROS was otherwise negative except as noted in the HPI.    Current Outpatient Medications   Medication    acetaminophen (TYLENOL) 325 MG tablet    amLODIPine (NORVASC) 10 MG tablet    Calcium Carbonate-Vitamin D (CALCIUM 600 +D HIGH POTENCY) 600-10 MG-MCG TABS    diclofenac (VOLTAREN) 1 % topical gel    famotidine (PEPCID) 20 MG tablet    ferrous sulfate (FEROSUL) 325 (65 Fe) MG tablet    finasteride (PROSCAR) 5 MG tablet    FLUoxetine  (PROZAC) 20 MG capsule    fluticasone-vilanterol (BREO ELLIPTA) 200-25 MCG/ACT inhaler    hydroxychloroquine (PLAQUENIL) 200 MG tablet    insulin aspart (NOVOLOG PEN) 100 UNIT/ML pen    lamoTRIgine (LAMICTAL) 25 MG tablet    levalbuterol (XOPENEX HFA) 45 MCG/ACT inhaler    levothyroxine (SYNTHROID/LEVOTHROID) 25 MCG tablet    magnesium glycinate 100 MG CAPS capsule    mirtazapine (REMERON) 7.5 MG tablet    multivitamin w/minerals (THERA-VIT-M) tablet    mycophenolic acid (GENERIC EQUIVALENT) 180 MG EC tablet    ondansetron (ZOFRAN-ODT) 4 MG ODT tab    pantoprazole (PROTONIX) 40 MG EC tablet    predniSONE (DELTASONE) 2.5 MG tablet    predniSONE (DELTASONE) 5 MG tablet    propranolol (INDERAL) 20 MG tablet    rOPINIRole (REQUIP) 0.25 MG tablet    rosuvastatin (CRESTOR) 10 MG tablet    sulfamethoxazole-trimethoprim (BACTRIM) 400-80 MG tablet    tacrolimus (GENERIC) 1 MG capsule    tamsulosin (FLOMAX) 0.4 MG capsule    warfarin ANTICOAGULANT (COUMADIN) 1 MG tablet    warfarin ANTICOAGULANT (COUMADIN) 1 MG tablet    insulin pen needle (32G X 4 MM) 32G X 4 MM miscellaneous    senna-docusate (SENOKOT-S/PERICOLACE) 8.6-50 MG tablet    tacrolimus (GENERIC) 0.5 MG capsule     No current facility-administered medications for this visit.     No Known Allergies  Past Medical History:   Diagnosis Date    BRENNAN (acute kidney injury) (H24) 10/17/2021    Anxiety     Aspergillus (H) 03/08/2023    Depression     HLD (hyperlipidemia)     HTN (hypertension)     Hypothyroidism     ILD (interstitial lung disease) (H)     Infection due to 2019 novel coronavirus 03/2023    Lung infection 07/12/2023    SALTY on CPAP     Oxygen dependent     BL 4L since ~6/2021    Primary hypertension 02/28/2022    Rheumatoid arthritis (H)     signs ~5/2020, dx 5/2021    S/P lung transplant (H) 10/16/2021    Shock liver 10/17/2021    Steroid-induced hyperglycemia     Traction bronchiectasis (H)        Past Surgical History:   Procedure Laterality Date     BRONCHOSCOPY (RIGID OR FLEXIBLE), DIAGNOSTIC N/A 1/26/2022    Procedure: BRONCHOSCOPY, WITH BRONCHOALVEOLAR LAVAGE AND BIOPSY;  Surgeon: Perlman, David Morris, MD;  Location:  GI    BRONCHOSCOPY (RIGID OR FLEXIBLE), DIAGNOSTIC N/A 4/19/2022    Procedure: BRONCHOSCOPY, WITH BRONCHOALVEOLAR LAVAGE AND BIOPSY;  Surgeon: Sherine Merrill MD;  Location:  GI    BRONCHOSCOPY (RIGID OR FLEXIBLE), DIAGNOSTIC N/A 6/21/2022    Procedure: BRONCHOSCOPY, WITH BRONCHOALVEOLAR LAVAGE AND BIOPSY;  Surgeon: Aneesh Rubio MD;  Location:  GI    BRONCHOSCOPY FLEXIBLE AND RIGID N/A 11/15/2023    Procedure: Surveillance Bronchoscopy with airway check;  Surgeon: Ron Daniels MD;  Location:  GI    COLONOSCOPY W/ BIOPSIES AND POLYPECTOMY  07/21/2020    CV CORONARY ANGIOGRAM N/A 09/08/2021    Procedure: Coronary Angiogram with possible intervention;  Surgeon: Jovon Bullock MD;  Location:  HEART CARDIAC CATH LAB    CV RIGHT HEART CATH MEASUREMENTS RECORDED N/A 09/08/2021    Procedure: Right Heart Cath;  Surgeon: Jovon Bullock MD;  Location:  HEART CARDIAC CATH LAB    ESOPHAGOSCOPY, GASTROSCOPY, DUODENOSCOPY (EGD), COMBINED N/A 10/23/2021    Procedure: ESOPHAGOGASTRODUODENOSCOPY (EGD);  Surgeon: Miquel Pisano MD;  Location:  GI    ESOPHAGOSCOPY, GASTROSCOPY, DUODENOSCOPY (EGD), COMBINED N/A 11/02/2021    Procedure: ESOPHAGOGASTRODUODENOSCOPY (EGD);  Surgeon: Daniel Ortiz MD;  Location:  GI    ESOPHAGOSCOPY, GASTROSCOPY, DUODENOSCOPY (EGD), COMBINED N/A 11/5/2021    Procedure: ESOPHAGOGASTRODUODENOSCOPY (EGD);  Surgeon: Ronnell Hernandez MD;  Location:  GI    EXTRACTION(S) DENTAL N/A 09/22/2021    Procedure: EXTRACTION tooth #19;  Surgeon: Deepak Tobin DDS;  Location:  OR    HERNIA REPAIR      IR CHEST TUBE PLACEMENT NON-TUNNELLED LEFT  11/03/2021    PICC TRIPLE LUMEN PLACEMENT Left 11/04/2021    5FR TL PICC. Right non occlusive thrombus subclavian vein.    REPLACE  GASTROJEJUNOSTOMY TUBE, PERCUTANEOUS N/A 11/9/2021    Procedure: REPLACEMENT, GASTROJEJUNOSTOMY TUBE, PERCUTANEOUS;  Surgeon: Hernando Rodriguez MD;  Location: [facility]    right acl      TRACHEOSTOMY PERCUTANEOUS N/A 10/29/2021    Procedure: Percutaneous Tracheostomy,;  Surgeon: Celine Jenkins MD;  Location: [facility]    TRANSPLANT LUNG RECIPIENT SINGLE X2 Bilateral 10/16/2021    Procedure: TRANSPLANT, LUNG, RECIPIENT, BILATERAL, Bronchoscopy, on-pump perfusion, bilateral clamshell sternotomy;  Surgeon: Yanick Corral MD;  Location: [facility] OR    XR ACROMIOCLAVICULAR JOINT BILATERAL         Social History     Socioeconomic History    Marital status: Single     Spouse name: Not on file    Number of children: Not on file    Years of education: Not on file    Highest education level: Not on file   Occupational History    Not on file   Tobacco Use    Smoking status: Never    Smokeless tobacco: Never   Substance and Sexual Activity    Alcohol use: Never    Drug use: Never    Sexual activity: Not on file   Other Topics Concern    Parent/sibling w/ CABG, MI or angioplasty before 65F 55M? Not Asked   Social History Narrative    Full time  for the [employer] starting May 24, 2023.     Social Determinants of Health     Financial Resource Strain: Not on file   Food Insecurity: Not on file   Transportation Needs: Not on file   Physical Activity: Not on file   Stress: Not on file   Social Connections: Not on file   Interpersonal Safety: Not on file   Housing Stability: Not on file         /84 (BP Location: Right arm, Patient Position: Sitting, Cuff Size: Adult Regular)   Pulse 58   Wt 70.3 kg (155 lb)   SpO2 96%   BMI 26.61 kg/m    Body mass index is 26.61 kg/m .  Exam:   GENERAL APPEARANCE: Well developed, well nourished, alert, and in no apparent distress.  EYES: PERRL, EOMI  HENT: Nasal mucosa with no edema and no hyperemia. No nasal polyps.  EARS: Canals clear, TMs normal  MOUTH: Oral mucosa is  moist, without any lesions, no tonsillar enlargement, no oropharyngeal exudate.  NECK: supple, no masses, no thyromegaly.  LYMPHATICS: No significant axillary, cervical, or supraclavicular nodes.  RESP: normal percussion, diminished airflow at the right base otherwise good airflow throughout.  Minimal right basilar crackles. No rhonchi. No wheezes.  CV: Normal S1, S2, regular rhythm, normal rate. No murmur.  No rub. No gallop. No LE edema.   ABDOMEN:  Bowel sounds normal, soft, nontender, no HSM or masses.   MS: extremities normal. (+) clubbing. No cyanosis.  SKIN: no rash on limited exam  NEURO: Mentation intact, speech normal, normal strength and tone, normal gait and stance  PSYCH: mentation appears normal. and affect normal/bright  Results:  Recent Results (from the past 168 hour(s))   6 minute walk test    Collection Time: 02/13/24 12:00 AM   Result Value Ref Range    6 min walk (FT) 1,360 1,337 ft    6 Min Walk (M) 415 408 m   INR    Collection Time: 02/13/24  8:02 AM   Result Value Ref Range    INR 2.64 (H) 0.85 - 1.15   Vitamin D Deficiency    Collection Time: 02/13/24  8:02 AM   Result Value Ref Range    Vitamin D, Total (25-Hydroxy) 30 20 - 50 ng/mL   Lipid Profile    Collection Time: 02/13/24  8:02 AM   Result Value Ref Range    Cholesterol 181 <200 mg/dL    Triglycerides 229 (H) <150 mg/dL    Direct Measure HDL 55 >=40 mg/dL    LDL Cholesterol Calculated 80 <=100 mg/dL    Non HDL Cholesterol 126 <130 mg/dL    Patient Fasting > 8hrs? Unknown    Hemoglobin A1c    Collection Time: 02/13/24  8:02 AM   Result Value Ref Range    Hemoglobin A1C 5.7 (H) <5.7 %   Comprehensive metabolic panel    Collection Time: 02/13/24  8:02 AM   Result Value Ref Range    Sodium 141 135 - 145 mmol/L    Potassium 3.9 3.4 - 5.3 mmol/L    Carbon Dioxide (CO2) 24 22 - 29 mmol/L    Anion Gap 10 7 - 15 mmol/L    Urea Nitrogen 17.3 6.0 - 20.0 mg/dL    Creatinine 1.06 0.67 - 1.17 mg/dL    GFR Estimate 81 >60 mL/min/1.73m2    Calcium  9.1 8.6 - 10.0 mg/dL    Chloride 107 98 - 107 mmol/L    Glucose 94 70 - 99 mg/dL    Alkaline Phosphatase 62 40 - 150 U/L    AST 28 0 - 45 U/L    ALT 30 0 - 70 U/L    Protein Total 6.9 6.4 - 8.3 g/dL    Albumin 4.4 3.5 - 5.2 g/dL    Bilirubin Total 0.3 <=1.2 mg/dL   Phosphorus    Collection Time: 02/13/24  8:02 AM   Result Value Ref Range    Phosphorus 2.2 (L) 2.5 - 4.5 mg/dL   Magnesium    Collection Time: 02/13/24  8:02 AM   Result Value Ref Range    Magnesium 1.5 (L) 1.7 - 2.3 mg/dL   General PFT Lab (Please always keep checked)    Collection Time: 02/13/24  8:10 AM   Result Value Ref Range    FVC-Pred 3.48 L    FVC-Pre 2.43 L    FVC-%Pred-Pre 69 %    FEV1-Pre 1.54 L    FEV1-%Pred-Pre 55 %    FEV1FVC-Pred 80 %    FEV1FVC-Pre 63 %    FEFMax-Pred 8.02 L/sec    FEFMax-Pre 4.90 L/sec    FEFMax-%Pred-Pre 61 %    FEF2575-Pred 2.50 L/sec    FEF2575-Pre 0.81 L/sec    KGY8316-%Pred-Pre 32 %    ExpTime-Pre 8.22 sec    FIFMax-Pre 4.58 L/sec    VC-Pred 3.88 L    VC-Pre 2.67 L    VC-%Pred-Pre 68 %    IC-Pred 2.54 L    IC-Pre 2.12 L    IC-%Pred-Pre 83 %    ERV-Pred 1.27 L    ERV-Pre 0.55 L    ERV-%Pred-Pre 43 %    FEV1FEV6-Pred 79 %    FEV1FEV6-Pre 64 %    FRCPleth-Pred 3.24 L    FRCPleth-Pre 2.43 L    FRCPleth-%Pred-Pre 74 %    RVPleth-Pred 2.19 L    RVPleth-Pre 1.88 L    RVPleth-%Pred-Pre 85 %    TLCPleth-Pred 5.91 L    TLCPleth-Pre 4.55 L    TLCPleth-%Pred-Pre 76 %    DLCOunc-Pred 23.22 ml/min/mmHg    DLCOunc-Pre 13.71 ml/min/mmHg    DLCOunc-%Pred-Pre 59 %    VA-Pre 3.41 L    VA-%Pred-Pre 64 %    FEV1SVC-Pred 72 %    FEV1SVC-Pre 58 %       Results as noted above.    PFT Interpretation:  Moderate obstructive ventilatory defect.  Increased from previous.  Similar to recent best.  Valid Maneuver  Mildly reduced total lung capacity suggestive of a restrictive component as well, not significantly changed from previous.  Normal residual volume.  Moderately reduced diffusing capacity, unchanged from previous.    On 6-minute walk  testing, the patient walked 1360 feet (lower limit of normal 1337 feet) oxygen saturation dropped from 97 to 94%.  Walk distance improved from previous.

## 2024-02-14 LAB
EBV DNA # SPEC NAA+PROBE: NOT DETECTED COPIES/ML
TESTOST SERPL-MCNC: 360 NG/DL (ref 240–950)

## 2024-02-15 LAB — IMMUKNOW IMMUNE CELL FUNCTION: 546 NG/ML

## 2024-02-23 NOTE — TELEPHONE ENCOUNTER
Please call Bret,  His PCP wants to start with Bret taking 20mg Prednisone and start Imodium   
Pt initially tested positive for covid on 2/16: Got IV remdesivir     Saw PCP today: thinks it's inflammation. Wants to do prednisone 20mg x5 days + Imodium.   SOB got worse this weekend especially with activity. Pulse ox at rest 92-95%.     Dizziness with coughing spells, Spo2 drops to 85% but recovers quickly.     Patient having diarrhea, started with covid. Going 6-8 times a day, loose and watery. Not usually large amounts.     Did not do stool testing. Did chest x-ray + cbc with PCP.      Reviewed with Dr. Woods:   -Imaging for chest x-ray requested   -stool studies (c-diff, enteric panel, ova parasite)  -labs including vbg and bmp   -okay with prednisone burst     Orders faxed to [facility]                
ambulatory

## 2024-02-27 ENCOUNTER — ANTICOAGULATION THERAPY VISIT (OUTPATIENT)
Dept: ANTICOAGULATION | Facility: CLINIC | Age: 59
End: 2024-02-27
Payer: COMMERCIAL

## 2024-02-27 DIAGNOSIS — I48.91 ATRIAL FIBRILLATION, UNSPECIFIED TYPE (H): Primary | ICD-10-CM

## 2024-02-27 DIAGNOSIS — Z79.899 ENCOUNTER FOR LONG-TERM (CURRENT) USE OF HIGH-RISK MEDICATION: ICD-10-CM

## 2024-02-27 NOTE — PROGRESS NOTES
ANTICOAGULATION MANAGEMENT     Edson Thornton Jr. 58 year old male is on warfarin with therapeutic INR result. (Goal INR 2.0-3.0)    Recent labs: (last 7 days)     02/26/24  0840   INR 2.2*       ASSESSMENT     Source(s): Chart Review  Previous INR was Therapeutic last 2(+) visits  Medication, diet, health changes since last INR chart reviewed; none identified    I left a detailed voicemail with the orders reflected in flowsheet. I have also requested a call back if there have been any missed doses, concerns, illness, fever, or if there have been any changes in medications, activity level, or diet       PLAN     Recommended plan for no diet, medication or health factor changes affecting INR     Dosing Instructions: Continue your current warfarin dose with next INR in 3 weeks       Summary  As of 2/27/2024      Full warfarin instructions:  4 mg every Sun; 5 mg all other days   Next INR check:  3/18/2024               Detailed voice message left for Bret with dosing instructions and follow up date.     Patient using outside facility for labs    Education provided:   Please call back if any changes to your diet, medications or how you've been taking warfarin    Plan made per [facility] anticoagulation protocol    Tammie Villasenor, RN  Anticoagulation Clinic  2/27/2024    _______________________________________________________________________     Anticoagulation Episode Summary       Current INR goal:  2.0-3.0   TTR:  75.8% (11.7 mo)   Target end date:  Indefinite   Send INR reminders to:  [facility]    Indications    Atrial fibrillation  unspecified type (H) [I48.91]  Encounter for long-term (current) use of high-risk medication [Z79.899]             Comments:               Anticoagulation Care Providers       Provider Role Specialty Phone number    Abiodun Woods MD Referring Pulmonary Disease [phone]

## 2024-02-28 ENCOUNTER — TELEPHONE (OUTPATIENT)
Dept: TRANSPLANT | Facility: CLINIC | Age: 59
End: 2024-02-28
Payer: COMMERCIAL

## 2024-02-28 NOTE — TELEPHONE ENCOUNTER
General  Route to LPN    Reason for call: Suni from [facility] called in to see what orders are needed for the blood draw she received.    Call back needed? Yes    Return Call Needed  Same as documented in contacts section  When to return call?: Same day: Route High Priority

## 2024-02-28 NOTE — TELEPHONE ENCOUNTER
Patient Call: General  Route to LPN    Reason for call: Kee from [facility], would like to Clarify if this is a duplicate specimen.     Call back needed? Yes    Return Call Needed  Same as documented in contacts section  When to return call?: Same day: Route High Priority

## 2024-02-28 NOTE — TELEPHONE ENCOUNTER
Called [facility] back patient had labs drawn on 2/26. Got Tacrolimus already resulted. Not sure which lab was supposed to be ran. Gave information for Suni to call writer back if needed.

## 2024-03-04 ENCOUNTER — LAB (OUTPATIENT)
Dept: LAB | Facility: CLINIC | Age: 59
End: 2024-03-04

## 2024-03-04 PROCEDURE — 80197 ASSAY OF TACROLIMUS: CPT | Performed by: INTERNAL MEDICINE

## 2024-03-06 LAB
TACROLIMUS BLD-MCNC: 8.4 UG/L (ref 5–15)
TME LAST DOSE: NORMAL H
TME LAST DOSE: NORMAL H

## 2024-03-11 ENCOUNTER — LAB (OUTPATIENT)
Dept: LAB | Facility: CLINIC | Age: 59
End: 2024-03-11

## 2024-03-11 DIAGNOSIS — Z94.2 LUNG REPLACED BY TRANSPLANT (H): ICD-10-CM

## 2024-03-11 PROCEDURE — 80197 ASSAY OF TACROLIMUS: CPT | Performed by: INTERNAL MEDICINE

## 2024-03-13 ENCOUNTER — TELEPHONE (OUTPATIENT)
Dept: TRANSPLANT | Facility: CLINIC | Age: 59
End: 2024-03-13
Payer: COMMERCIAL

## 2024-03-13 DIAGNOSIS — Z94.2 LUNG REPLACED BY TRANSPLANT (H): Primary | ICD-10-CM

## 2024-03-13 LAB
TACROLIMUS BLD-MCNC: 8.8 UG/L (ref 5–15)
TME LAST DOSE: NORMAL H
TME LAST DOSE: NORMAL H

## 2024-03-13 NOTE — TELEPHONE ENCOUNTER
General  Route to LPN    Reason for call: [facility] called, they need tacro orders     Call back needed? No

## 2024-03-18 ENCOUNTER — LAB (OUTPATIENT)
Dept: LAB | Facility: CLINIC | Age: 59
End: 2024-03-18
Payer: COMMERCIAL

## 2024-03-18 ENCOUNTER — ANTICOAGULATION THERAPY VISIT (OUTPATIENT)
Dept: ANTICOAGULATION | Facility: CLINIC | Age: 59
End: 2024-03-18
Payer: COMMERCIAL

## 2024-03-18 DIAGNOSIS — Z79.899 ENCOUNTER FOR LONG-TERM (CURRENT) USE OF HIGH-RISK MEDICATION: ICD-10-CM

## 2024-03-18 DIAGNOSIS — Z94.2 LUNG REPLACED BY TRANSPLANT (H): Primary | ICD-10-CM

## 2024-03-18 DIAGNOSIS — I48.91 ATRIAL FIBRILLATION, UNSPECIFIED TYPE (H): Primary | ICD-10-CM

## 2024-03-18 LAB — INR (EXTERNAL): 3.2 (ref 0.9–1.1)

## 2024-03-18 PROCEDURE — 80197 ASSAY OF TACROLIMUS: CPT | Performed by: INTERNAL MEDICINE

## 2024-03-18 NOTE — PROGRESS NOTES
ANTICOAGULATION MANAGEMENT     Edson Thornton Jr. 58 year old male is on warfarin with supratherapeutic INR result. (Goal INR 2.0-3.0)    Recent labs: (last 7 days)     03/18/24  1035   INR 3.2*       ASSESSMENT     Source(s): Chart Review and Patient/Caregiver Call     Warfarin doses taken: Warfarin taken as instructed  Diet: No new diet changes identified  Medication/supplement changes: None noted  New illness, injury, or hospitalization: No  Signs or symptoms of bleeding or clotting: No  Previous result: Therapeutic last 2(+) visits  Additional findings: None       PLAN     Recommended plan for no diet, medication or health factor changes affecting INR     Dosing Instructions: Continue your current warfarin dose with next INR in 3 weeks       Summary  As of 3/18/2024      Full warfarin instructions:  4 mg every Sun; 5 mg all other days   Next INR check:  4/8/2024               Telephone call with Bret who verbalizes understanding and agrees to plan    Patient using outside facility for labs    Education provided:   Please call back if any changes to your diet, medications or how you've been taking warfarin  Taking warfarin: purpose of warfarin and how it works and Importance of taking warfarin as instructed  Goal range and lab monitoring: goal range and significance of current result and Importance of therapeutic range  Dietary considerations: importance of consistent vitamin K intake, impact of vitamin K foods on INR, and vitamin K content of foods    Plan made per [facility] anticoagulation protocol    Freda Bhakta RN  Anticoagulation Clinic  3/18/2024    _______________________________________________________________________     Anticoagulation Episode Summary       Current INR goal:  2.0-3.0   TTR:  76.5% (1 y)   Target end date:  Indefinite   Send INR reminders to:  [facility]    Indications    Atrial fibrillation  unspecified type (H) [I48.91]  Encounter for long-term (current) use of high-risk  medication [Z79.899]             Comments:               Anticoagulation Care Providers       Provider Role Specialty Phone number    Abiodun Woods MD Referring Pulmonary Disease [phone]

## 2024-03-20 LAB
TACROLIMUS BLD-MCNC: 8.9 UG/L (ref 5–15)
TME LAST DOSE: NORMAL H
TME LAST DOSE: NORMAL H

## 2024-03-25 ENCOUNTER — ANTICOAGULATION THERAPY VISIT (OUTPATIENT)
Dept: ANTICOAGULATION | Facility: CLINIC | Age: 59
End: 2024-03-25
Payer: COMMERCIAL

## 2024-03-25 DIAGNOSIS — I48.91 ATRIAL FIBRILLATION, UNSPECIFIED TYPE (H): Primary | ICD-10-CM

## 2024-03-25 DIAGNOSIS — Z79.899 ENCOUNTER FOR LONG-TERM (CURRENT) USE OF HIGH-RISK MEDICATION: ICD-10-CM

## 2024-03-25 LAB — INR (EXTERNAL): 2.6 (ref 0.9–1.1)

## 2024-03-25 NOTE — PROGRESS NOTES
ANTICOAGULATION MANAGEMENT     Edson Thornton Jr. 58 year old male is on warfarin with therapeutic INR result. (Goal INR 2.0-3.0)    Recent labs: (last 7 days)     03/25/24  0000   INR 2.6*       ASSESSMENT     Source(s): Chart Review and Patient/Caregiver Call     Warfarin doses taken: Warfarin taken as instructed  Diet: No new diet changes identified  Medication/supplement changes: None noted  New illness, injury, or hospitalization: No  Signs or symptoms of bleeding or clotting: No  Previous result: Supratherapeutic  Additional findings: None       PLAN     Recommended plan for no diet, medication or health factor changes affecting INR     Dosing Instructions: Continue your current warfarin dose with next INR in 3 weeks       Summary  As of 3/25/2024      Full warfarin instructions:  4 mg every Sun; 5 mg all other days   Next INR check:  4/15/2024               Telephone call with Bret who verbalizes understanding and agrees to plan    Patient using outside facility for labs    Education provided:   Contact [phone] with any changes, questions or concerns.     Plan made per [facility] anticoagulation protocol    Mesha Chau RN  Anticoagulation Clinic  3/25/2024    _______________________________________________________________________     Anticoagulation Episode Summary       Current INR goal:  2.0-3.0   TTR:  77.8% (1 y)   Target end date:  Indefinite   Send INR reminders to:  [facility]    Indications    Atrial fibrillation  unspecified type (H) [I48.91]  Encounter for long-term (current) use of high-risk medication [Z79.899]             Comments:               Anticoagulation Care Providers       Provider Role Specialty Phone number    Abiodun Woods MD Referring Pulmonary Disease [phone]

## 2024-04-01 ENCOUNTER — LAB (OUTPATIENT)
Dept: LAB | Facility: CLINIC | Age: 59
End: 2024-04-01
Payer: COMMERCIAL

## 2024-04-01 DIAGNOSIS — Z94.2 LUNG REPLACED BY TRANSPLANT (H): Primary | ICD-10-CM

## 2024-04-01 PROCEDURE — 80197 ASSAY OF TACROLIMUS: CPT

## 2024-04-03 LAB
TACROLIMUS BLD-MCNC: 8.4 UG/L (ref 5–15)
TME LAST DOSE: NORMAL H
TME LAST DOSE: NORMAL H

## 2024-04-08 ENCOUNTER — DOCUMENTATION ONLY (OUTPATIENT)
Dept: ANTICOAGULATION | Facility: CLINIC | Age: 59
End: 2024-04-08
Payer: COMMERCIAL

## 2024-04-08 DIAGNOSIS — Z94.2 LUNG REPLACED BY TRANSPLANT (H): ICD-10-CM

## 2024-04-08 DIAGNOSIS — Z79.899 ENCOUNTER FOR LONG-TERM (CURRENT) USE OF HIGH-RISK MEDICATION: ICD-10-CM

## 2024-04-08 DIAGNOSIS — I63.9 ISCHEMIC STROKE (H): ICD-10-CM

## 2024-04-08 DIAGNOSIS — D84.9 IMMUNOSUPPRESSED STATUS (H): ICD-10-CM

## 2024-04-08 DIAGNOSIS — M81.8 OTHER OSTEOPOROSIS WITHOUT CURRENT PATHOLOGICAL FRACTURE: ICD-10-CM

## 2024-04-08 DIAGNOSIS — I48.91 ATRIAL FIBRILLATION, UNSPECIFIED TYPE (H): ICD-10-CM

## 2024-04-08 DIAGNOSIS — Z94.2 S/P LUNG TRANSPLANT (H): ICD-10-CM

## 2024-04-08 DIAGNOSIS — E61.1 IRON DEFICIENCY: ICD-10-CM

## 2024-04-08 RX ORDER — WARFARIN SODIUM 1 MG/1
TABLET ORAL
Qty: 385 TABLET | Refills: 1 | Status: SHIPPED | OUTPATIENT
Start: 2024-04-08

## 2024-04-08 RX ORDER — TACROLIMUS 1 MG/1
2 CAPSULE ORAL 2 TIMES DAILY
Qty: 360 CAPSULE | Refills: 3 | Status: SHIPPED | OUTPATIENT
Start: 2024-04-08 | End: 2024-04-25

## 2024-04-08 RX ORDER — SULFAMETHOXAZOLE AND TRIMETHOPRIM 400; 80 MG/1; MG/1
1 TABLET ORAL DAILY
Qty: 30 TABLET | Refills: 11 | Status: SHIPPED | OUTPATIENT
Start: 2024-04-08

## 2024-04-08 RX ORDER — HYDROXYCHLOROQUINE SULFATE 200 MG/1
200 TABLET, FILM COATED ORAL 2 TIMES DAILY
Qty: 180 TABLET | Refills: 3 | Status: SHIPPED | OUTPATIENT
Start: 2024-04-08 | End: 2024-06-28

## 2024-04-08 RX ORDER — ROSUVASTATIN CALCIUM 10 MG/1
10 TABLET, COATED ORAL DAILY
Qty: 30 TABLET | Refills: 11 | Status: SHIPPED | OUTPATIENT
Start: 2024-04-08

## 2024-04-08 RX ORDER — PANTOPRAZOLE SODIUM 40 MG/1
40 TABLET, DELAYED RELEASE ORAL
Qty: 60 TABLET | Refills: 11 | Status: SHIPPED | OUTPATIENT
Start: 2024-04-08

## 2024-04-08 RX ORDER — DIAPER,BRIEF,INFANT-TODD,DISP
1 EACH MISCELLANEOUS DAILY
Qty: 30 TABLET | Refills: 11 | Status: SHIPPED | OUTPATIENT
Start: 2024-04-08

## 2024-04-08 RX ORDER — LEVOTHYROXINE SODIUM 25 UG/1
25 TABLET ORAL DAILY
Qty: 30 TABLET | Refills: 11 | Status: SHIPPED | OUTPATIENT
Start: 2024-04-08

## 2024-04-08 RX ORDER — MULTIPLE VITAMINS W/ MINERALS TAB 9MG-400MCG
1 TAB ORAL DAILY
Qty: 30 TABLET | Refills: 11 | Status: SHIPPED | OUTPATIENT
Start: 2024-04-08

## 2024-04-08 NOTE — PROGRESS NOTES
ANTICOAGULATION  MANAGEMENT     Interacting Medication Review    Interacting medication(s): Sulfamethoxazole-Trimethoprim (Bactrim) with warfarin.    Duration: Long-term    Indication: Prophylaxis    New medication?: No, long term medication. No affect on INR anticipated at this time.        PLAN     No adjustment to anticoagulation plan needed; no further interaction anticipated with stable long term medication         Patient was not contacted    No adjustment to anticoagulation calendar was required    No change to [facility] priority; patient stable on interacting medication    Plan made per [facility] anticoagulation protocol    ESSIE COLÓN RN  Anticoagulation Clinic

## 2024-04-11 ENCOUNTER — TELEPHONE (OUTPATIENT)
Dept: TRANSPLANT | Facility: CLINIC | Age: 59
End: 2024-04-11
Payer: COMMERCIAL

## 2024-04-11 NOTE — TELEPHONE ENCOUNTER
Suni NP calls from local clinic  Saw patient on April 1st- congested/cough. Everything looked normal (chest x-ray, swabs, labs). Prescribed patient ten days of levaquin, finished this Tuesday     Pt seen again in clinic for follow up:  Feels more SOB, oxygen saturations 92%, dropped to 89% walking around clinic.   RVP/covid negative   Chest x-ray stable   WBC 15   No fevers  Sputum culture pending (bacterial and fungal)  Lungs wheezy     Per Dr. Mckeon:   --keep monitoring 02 sats at home, <90% ER   -non-con chest CT   -doxy 100mg BID x2 weeks   -once CT scan is back, if negative can do pred 40mg x5 days     Provider/patient understanding of plan.

## 2024-04-12 ENCOUNTER — TRANSFERRED RECORDS (OUTPATIENT)
Dept: HEALTH INFORMATION MANAGEMENT | Facility: CLINIC | Age: 59
End: 2024-04-12
Payer: COMMERCIAL

## 2024-04-12 ENCOUNTER — TELEPHONE (OUTPATIENT)
Dept: TRANSPLANT | Facility: CLINIC | Age: 59
End: 2024-04-12
Payer: COMMERCIAL

## 2024-04-12 NOTE — TELEPHONE ENCOUNTER
Please call JOAN Culp regarding CAT scan results are in and would like to discuss.    Suni's# [phone]

## 2024-04-12 NOTE — TELEPHONE ENCOUNTER
Called back, Suni gave overview of CT results. Also faxed report. Reviewed with Dr. Mckeon.   He'd like to start prednisone 40mg daily for 5 days. Relayed this to staff member with Suni's office.

## 2024-04-14 ENCOUNTER — TELEPHONE (OUTPATIENT)
Dept: TRANSPLANT | Facility: CLINIC | Age: 59
End: 2024-04-14
Payer: COMMERCIAL

## 2024-04-14 DIAGNOSIS — Z94.2 S/P LUNG TRANSPLANT (H): ICD-10-CM

## 2024-04-14 DIAGNOSIS — D84.9 IMMUNOSUPPRESSED STATUS (H): ICD-10-CM

## 2024-04-14 DIAGNOSIS — Z79.4 TYPE 2 DIABETES MELLITUS WITH HYPERGLYCEMIA, WITH LONG-TERM CURRENT USE OF INSULIN (H): ICD-10-CM

## 2024-04-14 DIAGNOSIS — E11.65 TYPE 2 DIABETES MELLITUS WITH HYPERGLYCEMIA, WITH LONG-TERM CURRENT USE OF INSULIN (H): ICD-10-CM

## 2024-04-14 NOTE — TELEPHONE ENCOUNTER
Pt started on 5 days Pred 40 mg. After day one . Reports his insulin has  because he had not needed it on the lower prednisone dose.    Updated insulin prescription sent (still on med list). Discussed with pt that prescription is to treat BS >200.     Enc to call with further concerns/questions.

## 2024-04-15 ENCOUNTER — TELEPHONE (OUTPATIENT)
Dept: TRANSPLANT | Facility: CLINIC | Age: 59
End: 2024-04-15
Payer: COMMERCIAL

## 2024-04-15 NOTE — TELEPHONE ENCOUNTER
Spoke with Suni QUEZADA.  Pt's fungal culture is growing small amount of fungal elements.  Culture sent to [facility] for speciation.  Will await speciation before deciding on treatment.

## 2024-04-17 ENCOUNTER — TELEPHONE (OUTPATIENT)
Dept: TRANSPLANT | Facility: CLINIC | Age: 59
End: 2024-04-17
Payer: COMMERCIAL

## 2024-04-17 NOTE — TELEPHONE ENCOUNTER
Please call Suni/JOAN Fitzgerald.  She wanted to discuss sputum culture. (Also, she is faxing results to our fax #).   Suni # [phone]

## 2024-04-17 NOTE — PROGRESS NOTES
04/17/24 2:22 PM  PATIENT LAB/IMAGING STATUS : No pending lab orders           Endocrinology and Diabetes Clinic         Follow up for: Osteoporosis      Assessment:  Management with s/p lung transplant with osteoporosis.  T score of -2.7 at the lumbar spine (L2-4) with risk factor of s/p lung transplant, chronic steroid treatment, rheumatoid arthritis, hypovitaminosis D, possibly hypogonadism, comorbidity of hyperparathyroidism, diabetes, hypothyroidism, history of stroke    Osteoporosis  Patient with osteoporosis.  T score of -2.7 s/p renal transplant.  Recommend to start Reclast infusion 5 mg yearly for 5 years  Yearly follow-up with me  Repeat DEXA scan every 2 years while on treatment  Will evaluate calcium intake with 24-hour urine calcium and recheck vitamin D level      Plan:   Cont Reclast infusion #2 soon in 6/2024  Cont Calcium and Vitamin D supplements  Cont diet rich in Calcium  DXA in 2025    The  Following reports and notes were reviewed: recent DXA scan, labs     The Longitudinal plan of care for osteoporosis condition was addressed during this visit. Due to added complexity of care, we will continue to support Edson , and the subsequent management of this condition(s) and with the ongoing continuity of care of this condition.    Billie Huntley MD  Endocrinology and Diabetes  Telephone contact:  [facility] [phone]  [facility] [phone]      Interval history  1 year follow up     Edson Thornton Jr. is a 57 year old male    Pt ran into a wall, hitting his right arm resulting in a very large bruise and pain about 2 weeks ago  Pt has problems with incomplete field of vision  Most recent DXA scan 11/2022 T-score -2.7 L2-4  Prior fractures no  Height loss no    Osteoporosis medications 6/2023 Reclast infusion, tolerating it well      Falls in the last year 2-3 times trippig on stuff outside and at work    Dietary Calcium cheese some nuts  ome bean green  Calcium supplements Calcium once MVM  Vitamin D supplements  with MVM and Calcium.  Alcohol consumption no  Smoking no    Exercise walking, some yard work.    Dental status needs to see dentist for routine check    Current Problem List:   Patient Active Problem List   Diagnosis    S/P lung transplant (H)    BRENNAN (acute kidney injury) (H24)    Shock liver    Ischemic stroke (H)    Atrial fibrillation, unspecified type (H)    Encounter for long-term (current) use of high-risk medication    Immunosuppressed status (H24)    Primary hypertension    Pulmonary air trapping    Hypomagnesemia    Bronchial hemorrhage    Pulmonary embolism (H)    Osteoporosis    Clinical diagnosis of COVID-19    Low immunoglobulin level    Aspergillus (H)    Lung infection       Past Medical and Past Surgical History:  Past Medical History:   Diagnosis Date    BRENNAN (acute kidney injury) (H24) 10/17/2021    Anxiety     Aspergillus (H) 03/08/2023    Depression     HLD (hyperlipidemia)     HTN (hypertension)     Hypothyroidism     ILD (interstitial lung disease) (H)     Infection due to 2019 novel coronavirus 03/2023    Lung infection 07/12/2023    SALTY on CPAP     Oxygen dependent     BL 4L since ~6/2021    Primary hypertension 02/28/2022    Rheumatoid arthritis (H)     signs ~5/2020, dx 5/2021    S/P lung transplant (H) 10/16/2021    Shock liver 10/17/2021    Steroid-induced hyperglycemia     Traction bronchiectasis (H)        Past Surgical History:   Procedure Laterality Date    BRONCHOSCOPY (RIGID OR FLEXIBLE), DIAGNOSTIC N/A 1/26/2022    Procedure: BRONCHOSCOPY, WITH BRONCHOALVEOLAR LAVAGE AND BIOPSY;  Surgeon: Perlman, David Morris, MD;  Location: [facility]    BRONCHOSCOPY (RIGID OR FLEXIBLE), DIAGNOSTIC N/A 4/19/2022    Procedure: BRONCHOSCOPY, WITH BRONCHOALVEOLAR LAVAGE AND BIOPSY;  Surgeon: Sherine Merrill MD;  Location: [facility]    BRONCHOSCOPY (RIGID OR FLEXIBLE), DIAGNOSTIC N/A 6/21/2022    Procedure: BRONCHOSCOPY, WITH  BRONCHOALVEOLAR LAVAGE AND BIOPSY;  Surgeon: Aneesh Rubio MD;  Location: [facility]    BRONCHOSCOPY FLEXIBLE AND RIGID N/A 11/15/2023    Procedure: Surveillance Bronchoscopy with airway check;  Surgeon: Ron Dnaiels MD;  [facility]    COLONOSCOPY W/ BIOPSIES AND POLYPECTOMY  07/21/2020    CV CORONARY ANGIOGRAM N/A 09/08/2021    Procedure: Coronary Angiogram with possible intervention;  Surgeon: Jovon Bullock MD;  Location:  HEART CARDIAC CATH LAB    CV RIGHT HEART CATH MEASUREMENTS RECORDED N/A 09/08/2021    Procedure: Right Heart Cath;  Surgeon: Jovon Bullock MD;  Location:  HEART CARDIAC CATH LAB    ESOPHAGOSCOPY, GASTROSCOPY, DUODENOSCOPY (EGD), COMBINED N/A 10/23/2021    Procedure: ESOPHAGOGASTRODUODENOSCOPY (EGD);  Surgeon: Miquel Pisano MD;  Location:  GI    ESOPHAGOSCOPY, GASTROSCOPY, DUODENOSCOPY (EGD), COMBINED N/A 11/02/2021    Procedure: ESOPHAGOGASTRODUODENOSCOPY (EGD);  Surgeon: Daniel Ortiz MD;  Location:  GI    ESOPHAGOSCOPY, GASTROSCOPY, DUODENOSCOPY (EGD), COMBINED N/A 11/5/2021    Procedure: ESOPHAGOGASTRODUODENOSCOPY (EGD);  Surgeon: Ronnell Hernandez MD;  Location:  GI    EXTRACTION(S) DENTAL N/A 09/22/2021    Procedure: EXTRACTION tooth #19;  Surgeon: Deepak Tobin DDS;  Location: [facility]    HERNIA REPAIR      IR CHEST TUBE PLACEMENT NON-TUNNELLED LEFT  11/03/2021    PICC TRIPLE LUMEN PLACEMENT Left 11/04/2021    5FR TL PICC. Right non occlusive thrombus subclavian vein.    REPLACE GASTROJEJUNOSTOMY TUBE, PERCUTANEOUS N/A 11/9/2021    Procedure: REPLACEMENT, GASTROJEJUNOSTOMY TUBE, PERCUTANEOUS;  Surgeon: Hernando Rodriguez MD;  Location: [facility]    right acl      TRACHEOSTOMY PERCUTANEOUS N/A 10/29/2021    Procedure: Percutaneous Tracheostomy,;  Surgeon: Celine Jenkins MD;  Location: [facility]    TRANSPLANT LUNG RECIPIENT SINGLE X2 Bilateral 10/16/2021    Procedure: TRANSPLANT, LUNG, RECIPIENT, BILATERAL, Bronchoscopy, on-pump  perfusion, bilateral clamshell sternotomy;  Surgeon: Yanick Corral MD;  Location: [facility]    XR ACROMIOCLAVICULAR JOINT BILATERAL         Medications:   Current Outpatient Medications   Medication Sig Dispense Refill    acetaminophen (TYLENOL) 325 MG tablet 3 tablets (975 mg) by Oral or Feeding Tube route every 8 hours as needed for mild pain 100 tablet 11    amLODIPine (NORVASC) 10 MG tablet Take 1 tablet (10 mg) by mouth at bedtime 30 tablet 11    Calcium Carbonate-Vitamin D (CALCIUM 600 +D HIGH POTENCY) 600-10 MG-MCG TABS 1 tablet by Oral or Feeding Tube route daily 30 tablet 11    diclofenac (VOLTAREN) 1 % topical gel Apply 4 g topically 4 times daily Both hands 150 g 11    famotidine (PEPCID) 20 MG tablet Take 20 mg by mouth daily      ferrous sulfate (FEROSUL) 325 (65 Fe) MG tablet Take 1 tablet (325 mg) by mouth daily (with breakfast) 90 tablet 3    finasteride (PROSCAR) 5 MG tablet Take 5 mg by mouth daily      FLUoxetine (PROZAC) 20 MG capsule Take 20 mg by mouth daily      fluticasone-vilanterol (BREO ELLIPTA) 200-25 MCG/ACT inhaler Inhale 1 puff into the lungs daily 28 each 11    hydroxychloroquine (PLAQUENIL) 200 MG tablet Take 1 tablet (200 mg) by mouth 2 times daily 180 tablet 3    insulin aspart (NOVOLOG PEN) 100 UNIT/ML pen Take 1-3 units TID if BS over 200. Max daily dose is 12 15 mL 11    insulin pen needle (32G X 4 MM) 32G X 4 MM miscellaneous Use 4 pen needles daily or as directed. (Patient not taking: Reported on 2/13/2024) 120 each 11    lamoTRIgine (LAMICTAL) 25 MG tablet Take 150 mg by mouth daily      levalbuterol (XOPENEX HFA) 45 MCG/ACT inhaler INHALE 2 PUFFS BY MOUTH EVERY 4 HOURS AS NEEDED FOR WHEEZING FOR SHORTNESS OF BREATH 15 g 0    levothyroxine (SYNTHROID/LEVOTHROID) 25 MCG tablet Take 1 tablet (25 mcg) by mouth daily 30 tablet 11    magnesium glycinate 100 MG CAPS capsule Take 4 capsules (400 mg) by mouth 3 times daily      mirtazapine (REMERON) 7.5 MG tablet Take 7.5 mg by  mouth At Bedtime      multivitamin w/minerals (THERA-VIT-M) tablet Take 1 tablet by mouth daily 30 tablet 11    mycophenolic acid (GENERIC EQUIVALENT) 180 MG EC tablet Take 1 tablet (180 mg) by mouth 2 times daily 60 tablet 11    ondansetron (ZOFRAN-ODT) 4 MG ODT tab Take 1 tablet (4 mg) by mouth every 6 hours as needed for nausea 30 tablet 3    pantoprazole (PROTONIX) 40 MG EC tablet Take 1 tablet (40 mg) by mouth 2 times daily (before meals) 60 tablet 11    predniSONE (DELTASONE) 2.5 MG tablet Take 1 tablet (2.5 mg) by mouth every evening Total dose: 5mg in AM and 2.5 mg in PM 30 tablet 11    predniSONE (DELTASONE) 5 MG tablet Take 1 tablet (5 mg) by mouth every morning AND 0.5 tablets (2.5 mg) every evening. 135 tablet 3    propranolol (INDERAL) 20 MG tablet Take 2 tablets (40 mg) by mouth 2 times daily 120 tablet 11    rOPINIRole (REQUIP) 0.25 MG tablet Take 0.25 mg by mouth At Bedtime      rosuvastatin (CRESTOR) 10 MG tablet Take 1 tablet (10 mg) by mouth daily 30 tablet 11    senna-docusate (SENOKOT-S/PERICOLACE) 8.6-50 MG tablet Take 2 tablets by mouth 2 times daily as needed for constipation (Patient not taking: Reported on 2/13/2024) 120 tablet 11    sulfamethoxazole-trimethoprim (BACTRIM) 400-80 MG tablet Take 1 tablet by mouth daily 30 tablet 11    tacrolimus (GENERIC EQUIVALENT) 0.5 MG capsule Total dose: 2mg in AM and 1.5mg in PM 90 capsule 3    tacrolimus (GENERIC EQUIVALENT) 1 MG capsule Take 2 capsules (2 mg) by mouth every morning AND 1 capsule (1 mg) every evening. Total dose: 2mg in AM and 1.5mg in  capsule 3    tamsulosin (FLOMAX) 0.4 MG capsule Take 1 capsule (0.4 mg) by mouth daily      warfarin ANTICOAGULANT (COUMADIN) 1 MG tablet Take 4 tablets (4 mg) Sun, Wed, Fri; and 5 tablets (5 mg) all other days of the week or as directed by Anticoagulation Clinic. 385 tablet 1    warfarin ANTICOAGULANT (COUMADIN) 1 MG tablet Take 2 to 3 tablets daily or as directed by the Coumadin clinic 90  "tablet 1       Allergies:   No Known Allergies    Social History     Tobacco Use    Smoking status: Never    Smokeless tobacco: Never   Substance Use Topics    Alcohol use: Never       Family History   Problem Relation Age of Onset    Diabetes Type 1 Mother     Heart Disease Mother     Chronic Obstructive Pulmonary Disease Mother     Rheumatoid Arthritis Father     Emphysema Paternal Grandfather        Physical Examination:  BP (!) 173/82   Pulse 61   Ht 1.651 m (5' 5\")   Wt 70.3 kg (155 lb)   SpO2 98%   BMI 25.79 kg/m      Wt Readings from Last 6 Encounters:   02/13/24 70.3 kg (155 lb)   11/16/23 66.2 kg (146 lb)   11/14/23 65.8 kg (145 lb)   08/01/23 68.5 kg (151 lb)   06/06/23 67.6 kg (149 lb 1.6 oz)   06/06/23 67.6 kg (149 lb)       General: Well appearing man in no distress, up and go gppd  Eyes: EOMI. Sclerae and conjunctivae are clear.        Labs and Studies:   Lab Results   Component Value Date    ERIK 9.1 02/13/2024    ERIK 9.5 11/14/2023    ERIK 9.3 08/01/2023    ERIK 9.8 06/06/2023    ERIK 9.7 06/06/2023    ALBUMIN 4.4 02/13/2024    ICAW 6.2 (H) 11/02/2021    ALT 30 02/13/2024    PHOS 2.2 (L) 02/13/2024    PTHI 72 (H) 11/14/2022    AST 28 02/13/2024    BILITOTAL 0.3 02/13/2024    CR 1.06 02/13/2024    CR 1.11 11/14/2023    CR 1.07 08/01/2023     02/13/2024    TSH 3.05 04/25/2023    ALKPHOS 62 02/13/2024    VITDT 30 02/13/2024    TESTOSTTOTAL 360 02/13/2024    HGB 13.4 11/14/2023       Lab Results   Component Value Date     02/13/2024    CHLORIDE 106 04/29/2024    CO2 24 02/13/2024    GLC 94 02/13/2024    CR 1.06 02/13/2024    CR 1.11 11/14/2023    CR 1.07 08/01/2023    CR 1.45 (H) 06/06/2023    CR 1.46 (H) 06/06/2023    ERIK 9.1 02/13/2024    MAG 1.5 (L) 02/13/2024    ALBUMIN 4.4 02/13/2024    ALKPHOS 62 02/13/2024    LDL 80 02/13/2024    HDL 55 02/13/2024    TRIG 229 (H) 02/13/2024    TSH 3.05 04/25/2023    TSH 3.17 11/19/2021    TSH 7.90 (H) 10/29/2021     No results found for: \"MICROL\"  Lab " Results   Component Value Date    A1C 5.7 (H) 02/13/2024    A1C 6.1 (H) 11/14/2022    A1C 5.3 11/15/2021    A1C 6.6 (H) 09/07/2021    A1C 6.5 (H) 09/05/2021       Lab Results   Component Value Date    HGB 13.4 11/14/2023         Results for orders placed in visit on 11/14/22    Dexa hip/pelvis/spine*    Narrative  Images from the original result were not included.    [address] [address]  Phone: [phone]   Fax: [phone]        Impression  Based on BMD diagnosis is consistent with osteoporosis based on WHO criteria Ref. 1    Results  Lumbar spine  T-score -2.7 (L2-4), BMD is 0.918 g/cm2    Left femoral neck  T-score -2.5    Right femoral neck  T-score -2.1    Left total femur  T-score -2.0 , BMD is 0.813 g/cm2    Right total femur  T-score -2.1, BMD is 0.797 g/cm2    Interval change  No prior study available for comparison.    Fracture risk  Fracture risk calculation is not indicated. Ref.4    Repeat  Recommend repeat DXA in 2 years.    The above are general recommendations for follow-up testing and intervals between BMD testing should be determined according to each patient's clinical status.      Principal result :  JANET Mortensen MD, [facility]  Division of Endocrinology and Diabetes  Department of Medicine    Technical quality  Satisfactory.  Abnormal vertebrae may be excluded from analysis if there is more than a 1.0 T-score difference between the vertebrae in question and adjacent vertebrae. Note the wide range in BMD values and T-scores within the lumbar spine. In the circumstances, the lumbar spine is represented by L2-L4.      References:  Ref. 1. WHO categories:  T-score > -1.0  = normal             .  T-score -1.0 to -2.5 = low bone density  T-score < -2.5 = osteoporosis        .    Ref. 2. 2015 ISCD official position statements:  www.iscd.org.    Ref. 3.  Today's examination is compared to the technically similar prior  study of the total hip and femur if available. Only changes deemed likely to be significant based on historical data are reported.  According to the ISCD position statements, total hip rather than femoral neck regions are to be compared because larger areas give better precision.  LSC = least significant changes at the [facility] (historical data)  AP spine =  0.032 g/cm2  (6/26/2007)  Left hip = 0.029 g/cm2  (6/15/2007)  Right hip = 0.018 g/cm2  (6/15/2007)  Left mid radius = 0.043 g/cm2  (6/26/2007)  Please note that the differential diagnosis of increase in bone density at the lumbar spine includes improvement due to pharmacotherapy vs inter-current progression of spine degeneration or fracture.    Ref. 4 Fracture risk is calculated in patients aged 40 to 90 years old with low bone density not on osteoporosis treatment. A 10 year fracture risk of 3% and higher for hip fracture and 20% and higher for major osteoporotic fracture is considered higher than acceptable risk and might be an indication for medical treatment.    Ref. 5.  By definition, osteoporosis may be diagnosed in the presence or with the history of a low trauma or fragility fracture.  Fragility and low trauma fracture is defined as a fracture resulting from the force of a fall from a standing height or less or a bone that breaks under conditions that would not cause a normal bone to break.    Ref. 6. [employer]F Physician's Guideline Website address:  www.nof.org.

## 2024-04-19 ENCOUNTER — TELEPHONE (OUTPATIENT)
Dept: TRANSPLANT | Facility: CLINIC | Age: 59
End: 2024-04-19
Payer: COMMERCIAL

## 2024-04-19 NOTE — TELEPHONE ENCOUNTER
Patient's PCP called regarding a positive Sputum culture and would like to go over it with the RNCC.

## 2024-04-19 NOTE — LETTER
PHYSICIAN ORDERS      DATE & TIME ISSUED: 2024 9:06 PM  PATIENT NAME: Edson Thornton Jr.   : 1965     [facility] MR# [if applicable]: [id number]     DIAGNOSIS:  Lung Transplant  Z94.2    Please draw Aspergillus Galactomannan Antigen and 1,3Beta D glucan fungitell on  along with standing lab orders.     Any questions please call: [place] [phone]    Please fax these results to [phone].      .  Abiodun Woods MD  Division of Pulmonary, Allergy, Critical Care and Sleep Medicine  Professor of Medicine

## 2024-04-19 NOTE — TELEPHONE ENCOUNTER
Suni local NP calling     Fungal sputum culture positive for Aspergillus Fumigatus complex    Writer checked in with patient    Prednisone finished Wednesday, has a week roughly left of doxy. More SOB than normal, cough is gone, bringing up a little sputum- clear (green/yellow previously) sinus drainage, out his nose, clear. No sinus pressure/pain. SOB has improved slightly compared to last week. No fever/chills.     Last fungal labs November     Reviewed with Dr. Woods:  -Images form CT done last week reviewed. Plan for Fungitell +galactomannon to be drawn on Monday 4/22    Addendum:  -fungitell positive, galactomannon negative. No further intervention at this time. Will check in with patient later this week.

## 2024-04-22 ENCOUNTER — LAB (OUTPATIENT)
Dept: LAB | Facility: CLINIC | Age: 59
End: 2024-04-22
Payer: COMMERCIAL

## 2024-04-22 DIAGNOSIS — Z94.2 LUNG REPLACED BY TRANSPLANT (H): Primary | ICD-10-CM

## 2024-04-22 PROCEDURE — 80197 ASSAY OF TACROLIMUS: CPT | Performed by: INTERNAL MEDICINE

## 2024-04-24 ENCOUNTER — MYC MEDICAL ADVICE (OUTPATIENT)
Dept: TRANSPLANT | Facility: CLINIC | Age: 59
End: 2024-04-24
Payer: COMMERCIAL

## 2024-04-24 ENCOUNTER — ANTICOAGULATION THERAPY VISIT (OUTPATIENT)
Dept: ANTICOAGULATION | Facility: CLINIC | Age: 59
End: 2024-04-24
Payer: COMMERCIAL

## 2024-04-24 DIAGNOSIS — I48.91 ATRIAL FIBRILLATION, UNSPECIFIED TYPE (H): Primary | ICD-10-CM

## 2024-04-24 DIAGNOSIS — Z79.899 ENCOUNTER FOR LONG-TERM (CURRENT) USE OF HIGH-RISK MEDICATION: ICD-10-CM

## 2024-04-24 DIAGNOSIS — Z94.2 LUNG REPLACED BY TRANSPLANT (H): ICD-10-CM

## 2024-04-24 DIAGNOSIS — D84.9 IMMUNOSUPPRESSED STATUS (H): ICD-10-CM

## 2024-04-24 LAB
TACROLIMUS BLD-MCNC: 12.6 UG/L (ref 5–15)
TME LAST DOSE: NORMAL H
TME LAST DOSE: NORMAL H

## 2024-04-24 NOTE — PROGRESS NOTES
ANTICOAGULATION MANAGEMENT     Edson Thornton Jr. 58 year old male is on warfarin with supratherapeutic INR result. (Goal INR 2.0-3.0)    Recent labs: (last 7 days)     04/22/24  0715   INR 4.4*       ASSESSMENT     Source(s): Chart Review and Patient/Caregiver Call     Warfarin doses taken: Warfarin taken as instructed  Diet: No new diet changes identified  Medication/supplement changes:  Patient just finished a prednisone burst course  New illness, injury, or hospitalization: Yes: Patient was in the ER last week for running into a wall and hurting his arm.  Patient was evaluated and findings were negative  Signs or symptoms of bleeding or clotting: No  Previous result: Therapeutic last 2(+) visits  Additional findings: None       PLAN     Recommended plan for temporary change(s) affecting INR     Dosing Instructions: hold dose then continue your current warfarin dose with next INR in 1 week       Summary  As of 4/24/2024      Full warfarin instructions:  4/24: Hold; Otherwise 4 mg every Sun; 5 mg all other days   Next INR check:  4/29/2024               Telephone call with Bret who verbalizes understanding and agrees to plan and who agrees to plan and repeated back plan correctly    Patient using outside facility for labs    Education provided:   Taking warfarin: Importance of taking warfarin as instructed  Goal range and lab monitoring: goal range and significance of current result and Importance of therapeutic range    Plan made per [facility] anticoagulation protocol    Terrie Cedillo RN  Anticoagulation Clinic  4/24/2024    _______________________________________________________________________     Anticoagulation Episode Summary       Current INR goal:  2.0-3.0   TTR:  75.2% (1 y)   Target end date:  Indefinite   Send INR reminders to:  [facility]    Indications    Atrial fibrillation  unspecified type (H) [I48.91]  Encounter for long-term (current) use of high-risk medication [Z79.899]              Comments:               Anticoagulation Care Providers       Provider Role Specialty Phone number    Abiodun Woods MD Referring Pulmonary Disease [phone]

## 2024-04-25 RX ORDER — TACROLIMUS 0.5 MG/1
CAPSULE ORAL
Qty: 90 CAPSULE | Refills: 3 | Status: SHIPPED | OUTPATIENT
Start: 2024-04-25 | End: 2024-05-08

## 2024-04-25 RX ORDER — TACROLIMUS 1 MG/1
CAPSULE ORAL
Qty: 270 CAPSULE | Refills: 3 | Status: SHIPPED | OUTPATIENT
Start: 2024-04-25 | End: 2024-05-08

## 2024-04-25 NOTE — TELEPHONE ENCOUNTER
Following message sent to patient:  1.Tacrolimus level from 4/22 came back at 12.6. Goal 8-10.   Can you decrease your dose to 2mg in AM and 1.5mg in PM?   Recheck level again in 7-10 days.      2.I believe you finished up your doxy this week? How are you feeling compared to when we talked last week? Any new or worsening symptoms?     Pt understanding of plan. WBC slightly elevated. Will recheck labs next week.    Pt reply below.     Good morning, Alfred   on the Tacro I can do that. I m feeling much better thank you. As far as anything else going on, the only thing is, I hit my arm and created a massive hematoma and bruising. I went to the primary and they said it will take time to heal. They did x-ray and ultrasound. If you think of anything else, just let me know, I ll talk to you later .

## 2024-04-29 ENCOUNTER — LAB (OUTPATIENT)
Dept: LAB | Facility: CLINIC | Age: 59
End: 2024-04-29
Payer: COMMERCIAL

## 2024-04-29 ENCOUNTER — ANTICOAGULATION THERAPY VISIT (OUTPATIENT)
Dept: ANTICOAGULATION | Facility: CLINIC | Age: 59
End: 2024-04-29
Payer: COMMERCIAL

## 2024-04-29 DIAGNOSIS — Z79.899 ENCOUNTER FOR LONG-TERM (CURRENT) USE OF HIGH-RISK MEDICATION: ICD-10-CM

## 2024-04-29 DIAGNOSIS — Z94.2 LUNG REPLACED BY TRANSPLANT (H): Primary | ICD-10-CM

## 2024-04-29 DIAGNOSIS — I48.91 ATRIAL FIBRILLATION, UNSPECIFIED TYPE (H): Primary | ICD-10-CM

## 2024-04-29 LAB — INR (EXTERNAL): 4.1

## 2024-04-29 PROCEDURE — 80197 ASSAY OF TACROLIMUS: CPT | Performed by: INTERNAL MEDICINE

## 2024-04-29 NOTE — PROGRESS NOTES
"ANTICOAGULATION MANAGEMENT     Edson Thornton Jr. 58 year old male is on warfarin with supratherapeutic INR result. (Goal INR 2.0-3.0)    Recent labs: (last 7 days)     04/29/24  0730   INR 4.1       ASSESSMENT     Source(s): Chart Review and Patient/Caregiver Call     Warfarin doses taken: Warfarin taken as instructed  Diet: No new diet changes identified  Medication/supplement changes:  4/25/24 Tacrolimus decreased; Finished Prednisone course and Doxycycline last week  New illness, injury, or hospitalization: Yes: Ran into wall 4/18/24 and was in clinic 4/23/24 for arm xray/US, had worsening ecchymosis and went to ED 4/25/24 - US early AM 4/26/24 \"appears similar.. overall... starting to appear to be resolving\" ED note stated they do no suspect compartment syndrome or acute neurovascular compromise. Bret states that the bruising is now down to his thumb but less on his forearm, said he always has numbness/tingling in his hands since his stroke but it's worse than usual in that hand, also said he had not been elevating his arm. I encouraged him to elevate his arm and that we always recommend to go in for medical eval with worsening symptoms. He said he is leaving in an hour to head to [place] for Endocrinologist appointment in the morning.  From note on 4/26/24 patient educated to \"please call if you develop any numbness, tingling to hand or develop any fevers or chills or worsening symptoms- patient verbalizes understanding.\"   Advised to call PCP clinic with an update on his symptoms, Bret verbalized understanding.  Signs or symptoms of bleeding or clotting: Yes: possible arm hematoma, see above note.  Previous result: Supratherapeutic - held dose 4/24/24  Additional findings:  None       PLAN     Recommended plan for ongoing change(s) affecting INR     Dosing Instructions: hold dose then decrease your warfarin dose (5.9% change) with next INR in 1 week       Summary  As of 4/29/2024      Full warfarin " instructions:  4/29: Hold; Otherwise 4 mg every Sun, Tue, Thu; 5 mg all other days   Next INR check:  5/6/2024               Telephone call with Bret who verbalizes understanding and agrees to plan  Sent [employer] message with dosing and follow up instructions    Patient using outside facility for labs    Education provided:   Goal range and lab monitoring: goal range and significance of current result  Symptom monitoring: monitoring for bleeding signs and symptoms, monitoring for stroke signs and symptoms, when to seek medical attention/emergency care, and if you hit your head or have a bad fall seek emergency care  Importance of notifying anticoagulation clinic for: if you did not receive dosing instructions on the same day as your labs were checked  Written instructions provided  Contact [phone] with any changes, questions or concerns.     Plan made with [facility] Pharmacist Usha Victoria RN  Anticoagulation Clinic  4/29/2024    _______________________________________________________________________     Anticoagulation Episode Summary       Current INR goal:  2.0-3.0   TTR:  73.4% (1 y)   Target end date:  Indefinite   Send INR reminders to:  [facility]    Indications    Atrial fibrillation  unspecified type (H) [I48.91]  Encounter for long-term (current) use of high-risk medication [Z79.899]             Comments:               Anticoagulation Care Providers       Provider Role Specialty Phone number    Abiodun Woods MD Referring Pulmonary Disease [phone]

## 2024-04-30 ENCOUNTER — OFFICE VISIT (OUTPATIENT)
Dept: ENDOCRINOLOGY | Facility: CLINIC | Age: 59
End: 2024-04-30
Payer: COMMERCIAL

## 2024-04-30 VITALS
HEART RATE: 61 BPM | OXYGEN SATURATION: 98 % | SYSTOLIC BLOOD PRESSURE: 173 MMHG | HEIGHT: 65 IN | BODY MASS INDEX: 25.83 KG/M2 | DIASTOLIC BLOOD PRESSURE: 82 MMHG | WEIGHT: 155 LBS

## 2024-04-30 DIAGNOSIS — M81.8 OTHER OSTEOPOROSIS WITHOUT CURRENT PATHOLOGICAL FRACTURE: Primary | ICD-10-CM

## 2024-04-30 DIAGNOSIS — Z94.2 S/P LUNG TRANSPLANT (H): ICD-10-CM

## 2024-04-30 PROCEDURE — 99214 OFFICE O/P EST MOD 30 MIN: CPT | Performed by: INTERNAL MEDICINE

## 2024-04-30 PROCEDURE — G2211 COMPLEX E/M VISIT ADD ON: HCPCS | Performed by: INTERNAL MEDICINE

## 2024-04-30 RX ORDER — ALBUTEROL SULFATE 90 UG/1
1-2 AEROSOL, METERED RESPIRATORY (INHALATION)
Status: CANCELLED
Start: 2024-06-18

## 2024-04-30 RX ORDER — EPINEPHRINE 1 MG/ML
0.3 INJECTION, SOLUTION, CONCENTRATE INTRAVENOUS EVERY 5 MIN PRN
Status: CANCELLED | OUTPATIENT
Start: 2024-06-18

## 2024-04-30 RX ORDER — HEPARIN SODIUM,PORCINE 10 UNIT/ML
5-20 VIAL (ML) INTRAVENOUS DAILY PRN
Status: CANCELLED | OUTPATIENT
Start: 2024-06-18

## 2024-04-30 RX ORDER — ZOLEDRONIC ACID 5 MG/100ML
5 INJECTION, SOLUTION INTRAVENOUS ONCE
Status: CANCELLED
Start: 2024-06-18

## 2024-04-30 RX ORDER — SILDENAFIL 100 MG/1
100 TABLET, FILM COATED ORAL
COMMUNITY
Start: 2022-12-29

## 2024-04-30 RX ORDER — HEPARIN SODIUM (PORCINE) LOCK FLUSH IV SOLN 100 UNIT/ML 100 UNIT/ML
5 SOLUTION INTRAVENOUS
Status: CANCELLED | OUTPATIENT
Start: 2024-06-18

## 2024-04-30 RX ORDER — ALBUTEROL SULFATE 0.83 MG/ML
2.5 SOLUTION RESPIRATORY (INHALATION)
Status: CANCELLED | OUTPATIENT
Start: 2024-06-18

## 2024-04-30 RX ORDER — METHYLPREDNISOLONE SODIUM SUCCINATE 125 MG/2ML
125 INJECTION, POWDER, LYOPHILIZED, FOR SOLUTION INTRAMUSCULAR; INTRAVENOUS
Status: CANCELLED
Start: 2024-06-18

## 2024-04-30 RX ORDER — DIPHENHYDRAMINE HYDROCHLORIDE 50 MG/ML
50 INJECTION INTRAMUSCULAR; INTRAVENOUS
Status: CANCELLED
Start: 2024-06-18

## 2024-04-30 ASSESSMENT — PAIN SCALES - GENERAL: PAINLEVEL: NO PAIN (0)

## 2024-04-30 NOTE — LETTER
4/30/2024       RE: Edson Thornton Jr.  [address]  [place] [address]     Dear Colleague,    Thank you for referring your patient, Edson Thornton Jr., to the [facility] [place] [facility]. Please see a copy of my visit note below.    04/17/24 2:22 PM  PATIENT LAB/IMAGING STATUS : No pending lab orders           Endocrinology and Diabetes Clinic         Follow up for: Osteoporosis      Assessment:  Management with s/p lung transplant with osteoporosis.  T score of -2.7 at the lumbar spine (L2-4) with risk factor of s/p lung transplant, chronic steroid treatment, rheumatoid arthritis, hypovitaminosis D, possibly hypogonadism, comorbidity of hyperparathyroidism, diabetes, hypothyroidism, history of stroke    Osteoporosis  Patient with osteoporosis.  T score of -2.7 s/p renal transplant.  Recommend to start Reclast infusion 5 mg yearly for 5 years  Yearly follow-up with me  Repeat DEXA scan every 2 years while on treatment  Will evaluate calcium intake with 24-hour urine calcium and recheck vitamin D level      Plan:   Cont Reclast infusion #2 soon in 6/2024  Cont Calcium and Vitamin D supplements  Cont diet rich in Calcium  DXA in 2025    The  Following reports and notes were reviewed: recent DXA scan, labs     The Longitudinal plan of care for osteoporosis condition was addressed during this visit. Due to added complexity of care, we will continue to support Edson , and the subsequent management of this condition(s) and with the ongoing continuity of care of this condition.    Billie Huntley MD  Endocrinology and Diabetes  Telephone contact:  [facility] [phone]  [facility] [phone]      Interval history  1 year follow up     Edson Thornton Jr. is a 57 year old male    Pt ran into a wall, hitting his right arm resulting in a very large bruise and pain  about 2 weeks ago  Pt has problems with incomplete field of vision  Most recent DXA scan 11/2022 T-score -2.7 L2-4  Prior fractures no  Height loss no    Osteoporosis medications 6/2023 Reclast infusion, tolerating it well      Falls in the last year 2-3 times trippig on stuff outside and at work    Dietary Calcium cheese some nuts ome bean green  Calcium supplements Calcium once MVM  Vitamin D supplements  with MVM and Calcium.  Alcohol consumption no  Smoking no    Exercise walking, some yard work.    Dental status needs to see dentist for routine check    Current Problem List:   Patient Active Problem List   Diagnosis    S/P lung transplant (H)    BRENNAN (acute kidney injury) (H24)    Shock liver    Ischemic stroke (H)    Atrial fibrillation, unspecified type (H)    Encounter for long-term (current) use of high-risk medication    Immunosuppressed status (H24)    Primary hypertension    Pulmonary air trapping    Hypomagnesemia    Bronchial hemorrhage    Pulmonary embolism (H)    Osteoporosis    Clinical diagnosis of COVID-19    Low immunoglobulin level    Aspergillus (H)    Lung infection       Past Medical and Past Surgical History:  Past Medical History:   Diagnosis Date    BRENNAN (acute kidney injury) (H24) 10/17/2021    Anxiety     Aspergillus (H) 03/08/2023    Depression     HLD (hyperlipidemia)     HTN (hypertension)     Hypothyroidism     ILD (interstitial lung disease) (H)     Infection due to 2019 novel coronavirus 03/2023    Lung infection 07/12/2023    SALTY on CPAP     Oxygen dependent     BL 4L since ~6/2021    Primary hypertension 02/28/2022    Rheumatoid arthritis (H)     signs ~5/2020, dx 5/2021    S/P lung transplant (H) 10/16/2021    Shock liver 10/17/2021    Steroid-induced hyperglycemia     Traction bronchiectasis (H)        Past Surgical History:   Procedure Laterality Date    BRONCHOSCOPY (RIGID OR FLEXIBLE), DIAGNOSTIC N/A 1/26/2022    Procedure: BRONCHOSCOPY, WITH BRONCHOALVEOLAR LAVAGE AND BIOPSY;   Surgeon: Perlman, David Morris, MD;  Location:  GI    BRONCHOSCOPY (RIGID OR FLEXIBLE), DIAGNOSTIC N/A 4/19/2022    Procedure: BRONCHOSCOPY, WITH BRONCHOALVEOLAR LAVAGE AND BIOPSY;  Surgeon: Sherine Merrill MD;  Location:  GI    BRONCHOSCOPY (RIGID OR FLEXIBLE), DIAGNOSTIC N/A 6/21/2022    Procedure: BRONCHOSCOPY, WITH BRONCHOALVEOLAR LAVAGE AND BIOPSY;  Surgeon: Aneesh Rubio MD;  Location:  GI    BRONCHOSCOPY FLEXIBLE AND RIGID N/A 11/15/2023    Procedure: Surveillance Bronchoscopy with airway check;  Surgeon: Ron Daniels MD;  Location:  GI    COLONOSCOPY W/ BIOPSIES AND POLYPECTOMY  07/21/2020    CV CORONARY ANGIOGRAM N/A 09/08/2021    Procedure: Coronary Angiogram with possible intervention;  Surgeon: Jovon Bullock MD;  Location:  HEART CARDIAC CATH LAB    CV RIGHT HEART CATH MEASUREMENTS RECORDED N/A 09/08/2021    Procedure: Right Heart Cath;  Surgeon: Jovon Bullock MD;  Location:  HEART CARDIAC CATH LAB    ESOPHAGOSCOPY, GASTROSCOPY, DUODENOSCOPY (EGD), COMBINED N/A 10/23/2021    Procedure: ESOPHAGOGASTRODUODENOSCOPY (EGD);  Surgeon: Miquel Pisano MD;  Location:  GI    ESOPHAGOSCOPY, GASTROSCOPY, DUODENOSCOPY (EGD), COMBINED N/A 11/02/2021    Procedure: ESOPHAGOGASTRODUODENOSCOPY (EGD);  Surgeon: Daniel Ortiz MD;  Location:  GI    ESOPHAGOSCOPY, GASTROSCOPY, DUODENOSCOPY (EGD), COMBINED N/A 11/5/2021    Procedure: ESOPHAGOGASTRODUODENOSCOPY (EGD);  Surgeon: Ronnell Hernandez MD;  Location:  GI    EXTRACTION(S) DENTAL N/A 09/22/2021    Procedure: EXTRACTION tooth #19;  Surgeon: Deepak Tobin DDS;  Location:  OR    HERNIA REPAIR      IR CHEST TUBE PLACEMENT NON-TUNNELLED LEFT  11/03/2021    PICC TRIPLE LUMEN PLACEMENT Left 11/04/2021    5FR TL PICC. Right non occlusive thrombus subclavian vein.    REPLACE GASTROJEJUNOSTOMY TUBE, PERCUTANEOUS N/A 11/9/2021    Procedure: REPLACEMENT, GASTROJEJUNOSTOMY TUBE, PERCUTANEOUS;  Surgeon:  Hernando Rodriguez MD;  Location: [facility] GI    right acl      TRACHEOSTOMY PERCUTANEOUS N/A 10/29/2021    Procedure: Percutaneous Tracheostomy,;  Surgeon: Celine Jenkins MD;  Location: [facility] OR    TRANSPLANT LUNG RECIPIENT SINGLE X2 Bilateral 10/16/2021    Procedure: TRANSPLANT, LUNG, RECIPIENT, BILATERAL, Bronchoscopy, on-pump perfusion, bilateral clamshell sternotomy;  Surgeon: Yanick Corral MD;  Location: [facility] OR    XR ACROMIOCLAVICULAR JOINT BILATERAL         Medications:   Current Outpatient Medications   Medication Sig Dispense Refill    acetaminophen (TYLENOL) 325 MG tablet 3 tablets (975 mg) by Oral or Feeding Tube route every 8 hours as needed for mild pain 100 tablet 11    amLODIPine (NORVASC) 10 MG tablet Take 1 tablet (10 mg) by mouth at bedtime 30 tablet 11    Calcium Carbonate-Vitamin D (CALCIUM 600 +D HIGH POTENCY) 600-10 MG-MCG TABS 1 tablet by Oral or Feeding Tube route daily 30 tablet 11    diclofenac (VOLTAREN) 1 % topical gel Apply 4 g topically 4 times daily Both hands 150 g 11    famotidine (PEPCID) 20 MG tablet Take 20 mg by mouth daily      ferrous sulfate (FEROSUL) 325 (65 Fe) MG tablet Take 1 tablet (325 mg) by mouth daily (with breakfast) 90 tablet 3    finasteride (PROSCAR) 5 MG tablet Take 5 mg by mouth daily      FLUoxetine (PROZAC) 20 MG capsule Take 20 mg by mouth daily      fluticasone-vilanterol (BREO ELLIPTA) 200-25 MCG/ACT inhaler Inhale 1 puff into the lungs daily 28 each 11    hydroxychloroquine (PLAQUENIL) 200 MG tablet Take 1 tablet (200 mg) by mouth 2 times daily 180 tablet 3    insulin aspart (NOVOLOG PEN) 100 UNIT/ML pen Take 1-3 units TID if BS over 200. Max daily dose is 12 15 mL 11    insulin pen needle (32G X 4 MM) 32G X 4 MM miscellaneous Use 4 pen needles daily or as directed. (Patient not taking: Reported on 2/13/2024) 120 each 11    lamoTRIgine (LAMICTAL) 25 MG tablet Take 150 mg by mouth daily      levalbuterol (XOPENEX HFA) 45 MCG/ACT inhaler INHALE 2 PUFFS  BY MOUTH EVERY 4 HOURS AS NEEDED FOR WHEEZING FOR SHORTNESS OF BREATH 15 g 0    levothyroxine (SYNTHROID/LEVOTHROID) 25 MCG tablet Take 1 tablet (25 mcg) by mouth daily 30 tablet 11    magnesium glycinate 100 MG CAPS capsule Take 4 capsules (400 mg) by mouth 3 times daily      mirtazapine (REMERON) 7.5 MG tablet Take 7.5 mg by mouth At Bedtime      multivitamin w/minerals (THERA-VIT-M) tablet Take 1 tablet by mouth daily 30 tablet 11    mycophenolic acid (GENERIC EQUIVALENT) 180 MG EC tablet Take 1 tablet (180 mg) by mouth 2 times daily 60 tablet 11    ondansetron (ZOFRAN-ODT) 4 MG ODT tab Take 1 tablet (4 mg) by mouth every 6 hours as needed for nausea 30 tablet 3    pantoprazole (PROTONIX) 40 MG EC tablet Take 1 tablet (40 mg) by mouth 2 times daily (before meals) 60 tablet 11    predniSONE (DELTASONE) 2.5 MG tablet Take 1 tablet (2.5 mg) by mouth every evening Total dose: 5mg in AM and 2.5 mg in PM 30 tablet 11    predniSONE (DELTASONE) 5 MG tablet Take 1 tablet (5 mg) by mouth every morning AND 0.5 tablets (2.5 mg) every evening. 135 tablet 3    propranolol (INDERAL) 20 MG tablet Take 2 tablets (40 mg) by mouth 2 times daily 120 tablet 11    rOPINIRole (REQUIP) 0.25 MG tablet Take 0.25 mg by mouth At Bedtime      rosuvastatin (CRESTOR) 10 MG tablet Take 1 tablet (10 mg) by mouth daily 30 tablet 11    senna-docusate (SENOKOT-S/PERICOLACE) 8.6-50 MG tablet Take 2 tablets by mouth 2 times daily as needed for constipation (Patient not taking: Reported on 2/13/2024) 120 tablet 11    sulfamethoxazole-trimethoprim (BACTRIM) 400-80 MG tablet Take 1 tablet by mouth daily 30 tablet 11    tacrolimus (GENERIC EQUIVALENT) 0.5 MG capsule Total dose: 2mg in AM and 1.5mg in PM 90 capsule 3    tacrolimus (GENERIC EQUIVALENT) 1 MG capsule Take 2 capsules (2 mg) by mouth every morning AND 1 capsule (1 mg) every evening. Total dose: 2mg in AM and 1.5mg in  capsule 3    tamsulosin (FLOMAX) 0.4 MG capsule Take 1 capsule (0.4  "mg) by mouth daily      warfarin ANTICOAGULANT (COUMADIN) 1 MG tablet Take 4 tablets (4 mg) Sun, Wed, Fri; and 5 tablets (5 mg) all other days of the week or as directed by Anticoagulation Clinic. 385 tablet 1    warfarin ANTICOAGULANT (COUMADIN) 1 MG tablet Take 2 to 3 tablets daily or as directed by the Coumadin clinic 90 tablet 1       Allergies:   No Known Allergies    Social History     Tobacco Use    Smoking status: Never    Smokeless tobacco: Never   Substance Use Topics    Alcohol use: Never       Family History   Problem Relation Age of Onset    Diabetes Type 1 Mother     Heart Disease Mother     Chronic Obstructive Pulmonary Disease Mother     Rheumatoid Arthritis Father     Emphysema Paternal Grandfather        Physical Examination:  BP (!) 173/82   Pulse 61   Ht 1.651 m (5' 5\")   Wt 70.3 kg (155 lb)   SpO2 98%   BMI 25.79 kg/m      Wt Readings from Last 6 Encounters:   02/13/24 70.3 kg (155 lb)   11/16/23 66.2 kg (146 lb)   11/14/23 65.8 kg (145 lb)   08/01/23 68.5 kg (151 lb)   06/06/23 67.6 kg (149 lb 1.6 oz)   06/06/23 67.6 kg (149 lb)       General: Well appearing man in no distress, up and go gppd  Eyes: EOMI. Sclerae and conjunctivae are clear.        Labs and Studies:   Lab Results   Component Value Date    ERIK 9.1 02/13/2024    ERIK 9.5 11/14/2023    ERIK 9.3 08/01/2023    ERIK 9.8 06/06/2023    ERIK 9.7 06/06/2023    ALBUMIN 4.4 02/13/2024    ICAW 6.2 (H) 11/02/2021    ALT 30 02/13/2024    PHOS 2.2 (L) 02/13/2024    PTHI 72 (H) 11/14/2022    AST 28 02/13/2024    BILITOTAL 0.3 02/13/2024    CR 1.06 02/13/2024    CR 1.11 11/14/2023    CR 1.07 08/01/2023     02/13/2024    TSH 3.05 04/25/2023    ALKPHOS 62 02/13/2024    VITDT 30 02/13/2024    TESTOSTTOTAL 360 02/13/2024    HGB 13.4 11/14/2023       Lab Results   Component Value Date     02/13/2024    CHLORIDE 106 04/29/2024    CO2 24 02/13/2024    GLC 94 02/13/2024    CR 1.06 02/13/2024    CR 1.11 11/14/2023    CR 1.07 08/01/2023    CR " "1.45 (H) 06/06/2023    CR 1.46 (H) 06/06/2023    ERIK 9.1 02/13/2024    MAG 1.5 (L) 02/13/2024    ALBUMIN 4.4 02/13/2024    ALKPHOS 62 02/13/2024    LDL 80 02/13/2024    HDL 55 02/13/2024    TRIG 229 (H) 02/13/2024    TSH 3.05 04/25/2023    TSH 3.17 11/19/2021    TSH 7.90 (H) 10/29/2021     No results found for: \"MICROL\"  Lab Results   Component Value Date    A1C 5.7 (H) 02/13/2024    A1C 6.1 (H) 11/14/2022    A1C 5.3 11/15/2021    A1C 6.6 (H) 09/07/2021    A1C 6.5 (H) 09/05/2021       Lab Results   Component Value Date    HGB 13.4 11/14/2023         Results for orders placed in visit on 11/14/22    Dexa hip/pelvis/spine*    Narrative  Images from the original result were not included.    [address] [address]  Phone: [phone]   Fax: [phone]        Impression  Based on BMD diagnosis is consistent with osteoporosis based on WHO criteria Ref. 1    Results  Lumbar spine  T-score -2.7 (L2-4), BMD is 0.918 g/cm2    Left femoral neck  T-score -2.5    Right femoral neck  T-score -2.1    Left total femur  T-score -2.0 , BMD is 0.813 g/cm2    Right total femur  T-score -2.1, BMD is 0.797 g/cm2    Interval change  No prior study available for comparison.    Fracture risk  Fracture risk calculation is not indicated. Ref.4    Repeat  Recommend repeat DXA in 2 years.    The above are general recommendations for follow-up testing and intervals between BMD testing should be determined according to each patient's clinical status.      Principal result :  JANET Mortensen MD, [facility]  Division of Endocrinology and Diabetes  Department of Medicine    Technical quality  Satisfactory.  Abnormal vertebrae may be excluded from analysis if there is more than a 1.0 T-score difference between the vertebrae in question and adjacent vertebrae. Note the wide range in BMD values and T-scores within the lumbar spine. In the circumstances, the lumbar spine is " represented by L2-L4.      References:  Ref. 1. WHO categories:  T-score > -1.0  = normal             .  T-score -1.0 to -2.5 = low bone density  T-score < -2.5 = osteoporosis        .    Ref. 2. 2015 ISCD official position statements:  www.iscd.org.    Ref. 3.  Today's examination is compared to the technically similar prior study of the total hip and femur if available. Only changes deemed likely to be significant based on historical data are reported.  According to the ISCD position statements, total hip rather than femoral neck regions are to be compared because larger areas give better precision.  LSC = least significant changes at the [facility] (historical data)  AP spine =  0.032 g/cm2  (6/26/2007)  Left hip = 0.029 g/cm2  (6/15/2007)  Right hip = 0.018 g/cm2  (6/15/2007)  Left mid radius = 0.043 g/cm2  (6/26/2007)  Please note that the differential diagnosis of increase in bone density at the lumbar spine includes improvement due to pharmacotherapy vs inter-current progression of spine degeneration or fracture.    Ref. 4 Fracture risk is calculated in patients aged 40 to 90 years old with low bone density not on osteoporosis treatment. A 10 year fracture risk of 3% and higher for hip fracture and 20% and higher for major osteoporotic fracture is considered higher than acceptable risk and might be an indication for medical treatment.    Ref. 5.  By definition, osteoporosis may be diagnosed in the presence or with the history of a low trauma or fragility fracture.  Fragility and low trauma fracture is defined as a fracture resulting from the force of a fall from a standing height or less or a bone that breaks under conditions that would not cause a normal bone to break.    Ref. 6. NOF Physician's Guideline Website address:  www.nof.org.      Billie Huntley MD

## 2024-04-30 NOTE — PATIENT INSTRUCTIONS
Please get dental check up    Reclast infusion #2 in June 20024    For scheduling osteoporosis treatment at the infusion center:    Take Calcium and Vitamin D with meals    Please call a [facility] at the   - [facility]  [phone] (will connect to [facility])  - Nicole

## 2024-05-01 LAB
TACROLIMUS BLD-MCNC: 9.3 UG/L (ref 5–15)
TME LAST DOSE: NORMAL H
TME LAST DOSE: NORMAL H

## 2024-05-01 NOTE — RESULT ENCOUNTER NOTE
Tacrolimus level 9.3 at close to 12 hours on 4/29/24  Goal 8-10  Level within goal, no change in dose

## 2024-05-06 ENCOUNTER — ANTICOAGULATION THERAPY VISIT (OUTPATIENT)
Dept: ANTICOAGULATION | Facility: CLINIC | Age: 59
End: 2024-05-06
Payer: COMMERCIAL

## 2024-05-06 ENCOUNTER — LAB (OUTPATIENT)
Dept: LAB | Facility: CLINIC | Age: 59
End: 2024-05-06
Payer: COMMERCIAL

## 2024-05-06 DIAGNOSIS — I48.91 ATRIAL FIBRILLATION, UNSPECIFIED TYPE (H): Primary | ICD-10-CM

## 2024-05-06 DIAGNOSIS — Z94.2 LUNG REPLACED BY TRANSPLANT (H): ICD-10-CM

## 2024-05-06 DIAGNOSIS — Z79.899 ENCOUNTER FOR LONG-TERM (CURRENT) USE OF HIGH-RISK MEDICATION: ICD-10-CM

## 2024-05-06 LAB — INR (EXTERNAL): 3.2

## 2024-05-06 PROCEDURE — 80197 ASSAY OF TACROLIMUS: CPT

## 2024-05-06 NOTE — PROGRESS NOTES
"ANTICOAGULATION MANAGEMENT     Edson Thornton Jr. 58 year old male is on warfarin with supratherapeutic INR result. (Goal INR 2.0-3.0)    Recent labs: (last 7 days)     05/06/24  0716   INR 3.2       ASSESSMENT     Source(s): Chart Review and Patient/Caregiver Call     Warfarin doses taken: Warfarin taken as instructed  Diet: No new diet changes identified  Medication/supplement changes:  4/25/24 Tacrolimus decreased. 5/1/24 [place] PRN ordered and instructed to use Tylenol PRN - states has not used Tylenol and has only taken 2 Norco.  New illness, injury, or hospitalization: No  Signs or symptoms of bleeding or clotting: Yes: 4/18/24 ran into wall and had arm xray/ultrasound for large bruise on 4/23/24, went to ED 4/25/24 with another ultrasound that showed bruise appeared to be resolving. Patient states pain is \"not bad\" and he is keeping heat on it and elevating as instructed. States bruise is \"getting better.\" Denies other signs of bleeding/stroke.  Previous result: Supratherapeutic - 4/29 last [facility] encounter hold and then 5.9% maintenance dose decrease instructed.  Additional findings: None       PLAN     Recommended plan for ongoing change(s) affecting INR     Dosing Instructions: partial hold then decrease your warfarin dose (6.2% change) with next INR in 1 week       Summary  As of 5/6/2024      Full warfarin instructions:  5/6: 3 mg; Otherwise 5 mg every Sun, Thu; 4 mg all other days   Next INR check:  5/13/2024               Telephone call with Bret who verbalizes understanding and agrees to plan  Sent [employer] message with dosing and follow up instructions    Patient using outside facility for labs    Education provided:   Goal range and lab monitoring: goal range and significance of current result  Symptom monitoring: monitoring for bleeding signs and symptoms, monitoring for stroke signs and symptoms, when to seek medical attention/emergency care, and if you hit your head or have a bad fall seek emergency " care  Importance of notifying anticoagulation clinic for: if you did not receive dosing instructions on the same day as your labs were checked  Written instructions provided  Contact [phone] with any changes, questions or concerns.     Plan made with [facility] Pharmacist Usha Victoria, RN  Anticoagulation Clinic  5/6/2024    _______________________________________________________________________     Anticoagulation Episode Summary       Current INR goal:  2.0-3.0   TTR:  71.5% (1 y)   Target end date:  Indefinite   Send INR reminders to:  [facility]    Indications    Atrial fibrillation  unspecified type (H) [I48.91]  Encounter for long-term (current) use of high-risk medication [Z79.899]             Comments:               Anticoagulation Care Providers       Provider Role Specialty Phone number    Abiodun Woods MD Referring Pulmonary Disease [phone]

## 2024-05-08 ENCOUNTER — TELEPHONE (OUTPATIENT)
Dept: TRANSPLANT | Facility: CLINIC | Age: 59
End: 2024-05-08
Payer: COMMERCIAL

## 2024-05-08 DIAGNOSIS — D84.9 IMMUNOSUPPRESSED STATUS (H): ICD-10-CM

## 2024-05-08 DIAGNOSIS — Z94.2 LUNG REPLACED BY TRANSPLANT (H): ICD-10-CM

## 2024-05-08 DIAGNOSIS — Z94.2 LUNG REPLACED BY TRANSPLANT (H): Primary | ICD-10-CM

## 2024-05-08 DIAGNOSIS — E83.42 HYPOMAGNESEMIA: ICD-10-CM

## 2024-05-08 LAB
TACROLIMUS BLD-MCNC: 6.8 UG/L (ref 5–15)
TME LAST DOSE: NORMAL H
TME LAST DOSE: NORMAL H

## 2024-05-08 RX ORDER — TACROLIMUS 1 MG/1
CAPSULE ORAL
Qty: 360 CAPSULE | Refills: 3 | Status: SHIPPED | OUTPATIENT
Start: 2024-05-08 | End: 2024-06-03

## 2024-05-08 RX ORDER — TACROLIMUS 0.5 MG/1
CAPSULE ORAL
Status: SHIPPED
Start: 2024-05-08 | End: 2024-06-03

## 2024-05-08 NOTE — TELEPHONE ENCOUNTER
Tacrolimus level 6.8 at 12 hours on 5/6/24  Goal 8-10  Current dose 2/1.5  Dose increased to 2/2  Recheck level 7-10 days     Pt understanding of plan

## 2024-05-13 ENCOUNTER — LAB (OUTPATIENT)
Dept: LAB | Facility: CLINIC | Age: 59
End: 2024-05-13
Payer: COMMERCIAL

## 2024-05-13 ENCOUNTER — ANTICOAGULATION THERAPY VISIT (OUTPATIENT)
Dept: ANTICOAGULATION | Facility: CLINIC | Age: 59
End: 2024-05-13
Payer: COMMERCIAL

## 2024-05-13 DIAGNOSIS — Z79.899 ENCOUNTER FOR LONG-TERM (CURRENT) USE OF HIGH-RISK MEDICATION: ICD-10-CM

## 2024-05-13 DIAGNOSIS — Z94.2 LUNG REPLACED BY TRANSPLANT (H): ICD-10-CM

## 2024-05-13 DIAGNOSIS — I48.91 ATRIAL FIBRILLATION, UNSPECIFIED TYPE (H): Primary | ICD-10-CM

## 2024-05-13 LAB — INR (EXTERNAL): 2.5 (ref 0.9–1.1)

## 2024-05-13 PROCEDURE — 80197 ASSAY OF TACROLIMUS: CPT

## 2024-05-13 NOTE — PROGRESS NOTES
ANTICOAGULATION MANAGEMENT     Edson Thornton Jr. 58 year old male is on warfarin with therapeutic INR result. (Goal INR 2.0-3.0)    Recent labs: (last 7 days)     05/13/24  0000   INR 2.5*       ASSESSMENT     Source(s): Chart Review and Patient/Caregiver Call     Warfarin doses taken: Warfarin taken as instructed  Diet: No new diet changes identified  Medication/supplement changes: None noted  New illness, injury, or hospitalization: No  Signs or symptoms of bleeding or clotting: No  Previous result: Supratherapeutic  Additional findings:  Bruising has pretty well subsided       PLAN     Recommended plan for no diet, medication or health factor changes affecting INR     Dosing Instructions: Continue your current warfarin dose with next INR in 1 week       Summary  As of 5/13/2024      Full warfarin instructions:  5 mg every Sun, Thu; 4 mg all other days   Next INR check:  5/20/2024               Telephone call with [place] who verbalizes understanding and agrees to plan and who agrees to plan and repeated back plan correctly    Patient using outside facility for labs    Education provided:   Taking warfarin: Importance of taking warfarin as instructed  Goal range and lab monitoring: goal range and significance of current result, Importance of therapeutic range, and Importance of following up at instructed interval    Plan made per [facility] anticoagulation protocol    Terrie Cedillo RN  Anticoagulation Clinic  5/13/2024    _______________________________________________________________________     Anticoagulation Episode Summary       Current INR goal:  2.0-3.0   TTR:  71.0% (1 y)   Target end date:  Indefinite   Send INR reminders to:  [facility]    Indications    Atrial fibrillation  unspecified type (H) [I48.91]  Encounter for long-term (current) use of high-risk medication [Z79.899]             Comments:               Anticoagulation Care Providers       Provider Role Specialty Phone number    Chuck  Abiodun Herndon MD Referring Pulmonary Disease [phone]

## 2024-05-15 LAB
TACROLIMUS BLD-MCNC: 9.2 UG/L (ref 5–15)
TME LAST DOSE: NORMAL H
TME LAST DOSE: NORMAL H

## 2024-05-18 ENCOUNTER — HEALTH MAINTENANCE LETTER (OUTPATIENT)
Age: 59
End: 2024-05-18

## 2024-05-20 ENCOUNTER — TELEPHONE (OUTPATIENT)
Dept: TRANSPLANT | Facility: CLINIC | Age: 59
End: 2024-05-20
Payer: COMMERCIAL

## 2024-05-20 ENCOUNTER — ANTICOAGULATION THERAPY VISIT (OUTPATIENT)
Dept: ANTICOAGULATION | Facility: CLINIC | Age: 59
End: 2024-05-20
Payer: COMMERCIAL

## 2024-05-20 DIAGNOSIS — I48.91 ATRIAL FIBRILLATION, UNSPECIFIED TYPE (H): Primary | ICD-10-CM

## 2024-05-20 DIAGNOSIS — Z79.899 ENCOUNTER FOR LONG-TERM (CURRENT) USE OF HIGH-RISK MEDICATION: ICD-10-CM

## 2024-05-20 LAB — INR (EXTERNAL): 2.4 (ref 0.9–1.1)

## 2024-05-20 NOTE — TELEPHONE ENCOUNTER
Sunshine from [facility] left  05/20/2024 at 0952., They need updated lab order faxed to Fax# [phone]    Sunshine's Phone# [phone].

## 2024-05-20 NOTE — TELEPHONE ENCOUNTER
Please call Sunshine from [facility]; PH# [phone].  Questions about recent lab order 05/20/2024.  Tacrolimus can they run this or should they be sending it back to us?    Second part of lab order;     CMP?  or should this be CMV and  what months should this be started.

## 2024-05-20 NOTE — LETTER
PHYSICIAN ORDERS      DATE & TIME ISSUED: May 20, 2024 10:51 AM  PATIENT NAME: Edson Thornton Jr.   : 1965     [facility] MR# [if applicable]: [id number]     DIAGNOSIS:  Long Term use of medications  Z79.899 and Lung Transplant  Z94.2    This is a standing order for a CBC with platelets, Tacrolimus level, CMP, Magnesium  to be drawn weekly and PRN.    Also a standing order for a CMP DNA Quant, EBV Quantitative PCR Plasma, and a PRA every 3 months and PRN      Any questions please call: Alfred @ [phone]      Please fax these results to [phone].      .  Abiodnu Woods MD  Division of Pulmonary, Allergy, Critical Care and Sleep Medicine  Professor of Medicine        
BACK PAIN

## 2024-05-20 NOTE — TELEPHONE ENCOUNTER
I called Sunshine from [facility] back, no answer. I left a voicemail stating I would fax over corrected standing orders for patient.

## 2024-05-20 NOTE — LETTER
PHYSICIAN ORDERS      DATE & TIME ISSUED: May 20, 2024 1:12 PM  PATIENT NAME: Edson Thornton Jr.   : 1965     [facility] MR# [if applicable]: [id number]     DIAGNOSIS:  Long Term use of medications  Z79.899 and Lung Transplant  Z94.2    Standing orders to check every week and PRN:  CMP  Magnesium  CBC with platelets  Tacrolimus (12 hour trough)    Also check every 3 months and PRN: (starting with next blood draw)  EBV Quantitative PCR Plasma  CMV Quantitative PCR (blood)  PRA Donor Specific Antibody         Any questions please call: Alfred @ [phone]    Please fax these results to [phone].      .  Abiodun Woods MD  Division of Pulmonary, Allergy, Critical Care and Sleep Medicine  Professor of Medicine

## 2024-05-20 NOTE — PROGRESS NOTES
ANTICOAGULATION MANAGEMENT     Edson Thornton Jr. 58 year old male is on warfarin with therapeutic INR result. (Goal INR 2.0-3.0)    Recent labs: (last 7 days)     05/20/24  1000   INR 2.4*       ASSESSMENT     Source(s): Chart Review and Patient/Caregiver Call     Warfarin doses taken: Warfarin taken as instructed  Diet: No new diet changes identified  Medication/supplement changes: None noted  New illness, injury, or hospitalization: No  Signs or symptoms of bleeding or clotting: No  Previous result: Therapeutic last visit; previously outside of goal range  Additional findings: None       PLAN     Recommended plan for no diet, medication or health factor changes affecting INR     Dosing Instructions: Continue your current warfarin dose with next INR in 1 week       Summary  As of 5/20/2024      Full warfarin instructions:  5 mg every Sun, Thu; 4 mg all other days   Next INR check:  5/28/2024               Telephone call with [place] who verbalizes understanding and agrees to plan  Sent [employer] message with dosing and follow up instructions    Patient using outside facility for labs    Education provided:   Please call back if any changes to your diet, medications or how you've been taking warfarin    Plan made per [facility] anticoagulation protocol    Nick Hunter, RN  Anticoagulation Clinic  5/20/2024    _______________________________________________________________________     Anticoagulation Episode Summary       Current INR goal:  2.0-3.0   TTR:  71.0% (1 y)   Target end date:  Indefinite   Send INR reminders to:  [facility]    Indications    Atrial fibrillation  unspecified type (H) [I48.91]  Encounter for long-term (current) use of high-risk medication [Z79.899]             Comments:               Anticoagulation Care Providers       Provider Role Specialty Phone number    Abiodun Woods MD Referring Pulmonary Disease [phone]

## 2024-05-23 ENCOUNTER — DOCUMENTATION ONLY (OUTPATIENT)
Dept: ANTICOAGULATION | Facility: CLINIC | Age: 59
End: 2024-05-23
Payer: COMMERCIAL

## 2024-05-23 DIAGNOSIS — I48.91 ATRIAL FIBRILLATION, UNSPECIFIED TYPE (H): Primary | ICD-10-CM

## 2024-05-23 NOTE — PROGRESS NOTES
ANTICOAGULATION CLINIC REFERRAL RENEWAL REQUEST       An annual renewal order is required for all patients referred to [facility] Anticoagulation [facility].?  Please review and sign the pended referral order for Edson Thornton Jr..       ANTICOAGULATION SUMMARY      Warfarin indication(s)   Atrial Fibrillation    Mechanical heart valve present?  NO       Current goal range   INR: 2.0-3.0     Goal appropriate for indication? Goal INR 2-3, standard for indication(s) above     Time in Therapeutic Range (TTR)  (Goal > 60%) 71.0%       Office visit with referring provider's group within last year yes on 2/13/24       ESSIE COLÓN RN  [facility] Anticoagulation [facility]

## 2024-05-28 ENCOUNTER — ANTICOAGULATION THERAPY VISIT (OUTPATIENT)
Dept: ANTICOAGULATION | Facility: CLINIC | Age: 59
End: 2024-05-28
Payer: COMMERCIAL

## 2024-05-28 ENCOUNTER — TELEPHONE (OUTPATIENT)
Dept: TRANSPLANT | Facility: CLINIC | Age: 59
End: 2024-05-28
Payer: COMMERCIAL

## 2024-05-28 DIAGNOSIS — Z79.899 ENCOUNTER FOR LONG-TERM (CURRENT) USE OF HIGH-RISK MEDICATION: ICD-10-CM

## 2024-05-28 DIAGNOSIS — Z94.2 S/P LUNG TRANSPLANT (H): Chronic | ICD-10-CM

## 2024-05-28 DIAGNOSIS — I48.91 ATRIAL FIBRILLATION, UNSPECIFIED TYPE (H): Primary | ICD-10-CM

## 2024-05-28 LAB — INR (EXTERNAL): 2.1

## 2024-05-28 RX ORDER — PREDNISONE 5 MG/1
TABLET ORAL
Qty: 135 TABLET | Refills: 3 | Status: SHIPPED | OUTPATIENT
Start: 2024-05-28

## 2024-05-28 NOTE — PROGRESS NOTES
ANTICOAGULATION MANAGEMENT     Edson Thornton Jr. 59 year old male is on warfarin with therapeutic INR result. (Goal INR 2.0-3.0)    Recent labs: (last 7 days)     05/28/24  0736   INR 2.1       ASSESSMENT     Source(s): Chart Review  Previous INR was Therapeutic last 2(+) visits  Medication, diet, health changes since last INR chart reviewed; none identified         PLAN     Recommended plan for no diet, medication or health factor changes affecting INR     Dosing Instructions: Continue your current warfarin dose with next INR in 1-2 weeks       Summary  As of 5/28/2024      Full warfarin instructions:  5 mg every Sun, Thu; 4 mg all other days   Next INR check:  6/10/2024               Detailed voice message left for Bret with dosing instructions and follow up date.   Sent [employer] message with dosing and follow up instructions    Patient using outside facility for labs    Education provided:   Please call back if any changes to your diet, medications or how you've been taking warfarin  Goal range and lab monitoring: goal range and significance of current result and Importance of following up at instructed interval  Contact [phone] with any changes, questions or concerns.     Plan made per [facility] anticoagulation protocol    ESSIE COLÓN RN  Anticoagulation Clinic  5/28/2024    _______________________________________________________________________     Anticoagulation Episode Summary       Current INR goal:  2.0-3.0   TTR:  71.0% (1 y)   Target end date:  Indefinite   Send INR reminders to:  [facility]    Indications    Atrial fibrillation  unspecified type (H) [I48.91]  Encounter for long-term (current) use of high-risk medication [Z79.899]             Comments:               Anticoagulation Care Providers       Provider Role Specialty Phone number    Abiodun Woods MD Referring Pulmonary Disease [phone]

## 2024-05-28 NOTE — TELEPHONE ENCOUNTER
Abhay from [facility] calling regarding the PRA DSA ordered with standing labs. Lab mele 2 red top tubes and is wondering if there suppose to be a kit for this or a requisition to send to  with this?  How are they suppose to process this? Lab test code?  [phone]

## 2024-05-28 NOTE — PROGRESS NOTES
5/28/24: staff message sent to Dr. Woods's staff to help facilitate review and signature.    ESSIE COLÓN RN  Anticoagulation Clinic  [phone]

## 2024-05-29 NOTE — TELEPHONE ENCOUNTER
Lab calls with question about PRA order, if this is to be a kit draw. Patient will need a kit so will mail this to patient. LM with lab requesting they redraw sample once patient has the kit, probably by next week.

## 2024-05-30 DIAGNOSIS — Z94.2 S/P LUNG TRANSPLANT (H): Primary | ICD-10-CM

## 2024-05-30 DIAGNOSIS — D84.9 IMMUNOSUPPRESSED STATUS (H): ICD-10-CM

## 2024-05-30 DIAGNOSIS — I48.91 ATRIAL FIBRILLATION, UNSPECIFIED TYPE (H): ICD-10-CM

## 2024-05-30 DIAGNOSIS — I63.9 ISCHEMIC STROKE (H): ICD-10-CM

## 2024-05-30 NOTE — PROGRESS NOTES
Annual referral order received from Dr. Woods 5/30.     Writer cancelled pended referral.     Standing lab orders faxed to patient's local clinic, [facility] Fax [phone].    ESSIE COLÓN RN  Anticoagulation Clinic  [phone]

## 2024-06-03 ENCOUNTER — TELEPHONE (OUTPATIENT)
Dept: TRANSPLANT | Facility: CLINIC | Age: 59
End: 2024-06-03
Payer: COMMERCIAL

## 2024-06-03 DIAGNOSIS — Z94.2 LUNG REPLACED BY TRANSPLANT (H): ICD-10-CM

## 2024-06-03 DIAGNOSIS — D84.9 IMMUNOSUPPRESSED STATUS (H): ICD-10-CM

## 2024-06-03 RX ORDER — TACROLIMUS 0.5 MG/1
CAPSULE ORAL
Qty: 30 CAPSULE | Refills: 11 | Status: SHIPPED | OUTPATIENT
Start: 2024-06-03 | End: 2024-07-16

## 2024-06-03 RX ORDER — TACROLIMUS 1 MG/1
CAPSULE ORAL
Qty: 360 CAPSULE | Refills: 3 | Status: SHIPPED | OUTPATIENT
Start: 2024-06-03 | End: 2024-07-16

## 2024-06-03 NOTE — TELEPHONE ENCOUNTER
Spoke with woody on the phone regarding tacrolimus level from last week. Was a 12 hour trough per patient. 12.2 goal is 8-10. Reduce dosing to 2 mg in AM and 1.5 mg in PM and recheck level next Monday 6/10. Patient confirmed plan. Also, no new medications started and no tylenol, alcohol or drugs. ALT is slightly elevated at 69. Plan to repeat another level when back in clinic on 6/18.

## 2024-06-05 ENCOUNTER — ANTICOAGULATION THERAPY VISIT (OUTPATIENT)
Dept: ANTICOAGULATION | Facility: CLINIC | Age: 59
End: 2024-06-05
Payer: COMMERCIAL

## 2024-06-05 ENCOUNTER — LAB (OUTPATIENT)
Dept: LAB | Facility: CLINIC | Age: 59
End: 2024-06-05
Payer: COMMERCIAL

## 2024-06-05 DIAGNOSIS — D84.9 IMMUNOSUPPRESSED STATUS (H): ICD-10-CM

## 2024-06-05 DIAGNOSIS — Z94.2 LUNG REPLACED BY TRANSPLANT (H): ICD-10-CM

## 2024-06-05 DIAGNOSIS — I48.91 ATRIAL FIBRILLATION, UNSPECIFIED TYPE (H): Primary | ICD-10-CM

## 2024-06-05 DIAGNOSIS — Z79.899 ENCOUNTER FOR LONG-TERM (CURRENT) USE OF HIGH-RISK MEDICATION: ICD-10-CM

## 2024-06-05 DIAGNOSIS — Z94.2 S/P LUNG TRANSPLANT (H): Chronic | ICD-10-CM

## 2024-06-05 DIAGNOSIS — I63.9 ISCHEMIC STROKE (H): ICD-10-CM

## 2024-06-05 LAB — INR (EXTERNAL): 2.4

## 2024-06-05 PROCEDURE — 86833 HLA CLASS II HIGH DEFIN QUAL: CPT

## 2024-06-05 PROCEDURE — 86832 HLA CLASS I HIGH DEFIN QUAL: CPT

## 2024-06-05 NOTE — PROGRESS NOTES
ANTICOAGULATION MANAGEMENT     Edson Thornton Jr. 59 year old male is on warfarin with therapeutic INR result. (Goal INR 2.0-3.0)    Recent labs: (last 7 days)     06/05/24  0000   INR 2.4       ASSESSMENT     Source(s): Chart Review and Patient/Caregiver Call     Warfarin doses taken: Warfarin taken as instructed  Diet: No new diet changes identified  Medication/supplement changes:  Tacro level was decreased   New illness, injury, or hospitalization: No  Signs or symptoms of bleeding or clotting: No  Previous result: Therapeutic last 2(+) visits  Additional findings: None       PLAN     Recommended plan for no diet, medication or health factor changes affecting INR     Dosing Instructions: Continue your current warfarin dose with next INR in 1 week       Summary  As of 6/5/2024      Full warfarin instructions:  5 mg every Sun, Thu; 4 mg all other days   Next INR check:  6/12/2024               Telephone call with Bret who verbalizes understanding and agrees to plan and who agrees to plan and repeated back plan correctly    Patient using outside facility for labs    Education provided:   Taking warfarin: Importance of taking warfarin as instructed  Goal range and lab monitoring: goal range and significance of current result and Importance of therapeutic range    Plan made per [facility] anticoagulation protocol    Terrie Cedillo, RN  Anticoagulation Clinic  6/5/2024    _______________________________________________________________________     Anticoagulation Episode Summary       Current INR goal:  2.0-3.0   TTR:  72.1% (1 y)   Target end date:  Indefinite   Send INR reminders to:   [facility]    Indications    Atrial fibrillation  unspecified type (H) [I48.91]  Encounter for long-term (current) use of high-risk medication [Z79.899]  Immunosuppressed status (H24) [D84.9]  S/P lung transplant (H) [Z94.2]  Ischemic stroke (H) [I63.9]             Comments:               Anticoagulation Care Providers        Provider Role Specialty Phone number    Abiodun Woods MD Referring Pulmonary Disease [phone]

## 2024-06-12 ENCOUNTER — ANTICOAGULATION THERAPY VISIT (OUTPATIENT)
Dept: ANTICOAGULATION | Facility: CLINIC | Age: 59
End: 2024-06-12
Payer: COMMERCIAL

## 2024-06-12 ENCOUNTER — LAB (OUTPATIENT)
Dept: LAB | Facility: CLINIC | Age: 59
End: 2024-06-12
Payer: COMMERCIAL

## 2024-06-12 DIAGNOSIS — Z94.2 S/P LUNG TRANSPLANT (H): Chronic | ICD-10-CM

## 2024-06-12 DIAGNOSIS — Z79.899 ENCOUNTER FOR LONG-TERM (CURRENT) USE OF HIGH-RISK MEDICATION: ICD-10-CM

## 2024-06-12 DIAGNOSIS — I48.91 ATRIAL FIBRILLATION, UNSPECIFIED TYPE (H): Primary | ICD-10-CM

## 2024-06-12 DIAGNOSIS — I63.9 ISCHEMIC STROKE (H): ICD-10-CM

## 2024-06-12 DIAGNOSIS — D84.9 IMMUNOSUPPRESSED STATUS (H): ICD-10-CM

## 2024-06-12 DIAGNOSIS — Z94.2 LUNG REPLACED BY TRANSPLANT (H): ICD-10-CM

## 2024-06-12 LAB — INR (EXTERNAL): 2.1 (ref 0.9–1.1)

## 2024-06-12 PROCEDURE — 80197 ASSAY OF TACROLIMUS: CPT

## 2024-06-12 NOTE — PROGRESS NOTES
ANTICOAGULATION MANAGEMENT     Edson Thornton Jr. 59 year old male is on warfarin with therapeutic INR result. (Goal INR 2.0-3.0)    Recent labs: (last 7 days)     06/12/24  0715   INR 2.1*       ASSESSMENT     Source(s): Chart Review and Patient/Caregiver Call     Warfarin doses taken: Warfarin taken as instructed  Diet: No new diet changes identified  Medication/supplement changes: None noted  New illness, injury, or hospitalization: No  Signs or symptoms of bleeding or clotting: No  Previous result: Therapeutic last 2(+) visits  Additional findings: None       PLAN     Recommended plan for no diet, medication or health factor changes affecting INR     Dosing Instructions: Continue your current warfarin dose with next INR in 6 days       Summary  As of 6/12/2024      Full warfarin instructions:  5 mg every Sun, Thu; 4 mg all other days   Next INR check:  6/18/2024               Telephone call with Bret who verbalizes understanding and agrees to plan    Lab visit scheduled    Education provided:   Please call back if any changes to your diet, medications or how you've been taking warfarin    Plan made per [facility] anticoagulation protocol    Nick Hunter, RN  Anticoagulation Clinic  6/12/2024    _______________________________________________________________________     Anticoagulation Episode Summary       Current INR goal:  2.0-3.0   TTR:  72.1% (1 y)   Target end date:  Indefinite   Send INR reminders to:  [facility]    Indications    Atrial fibrillation  unspecified type (H) [I48.91]  Encounter for long-term (current) use of high-risk medication [Z79.899]  Immunosuppressed status (H24) [D84.9]  S/P lung transplant (H) [Z94.2]  Ischemic stroke (H) [I63.9]             Comments:               Anticoagulation Care Providers       Provider Role Specialty Phone number    Abiodun Woods MD Referring Pulmonary Disease [phone]

## 2024-06-14 LAB
TACROLIMUS BLD-MCNC: 7.7 UG/L (ref 5–15)
TME LAST DOSE: NORMAL H
TME LAST DOSE: NORMAL H

## 2024-06-17 ENCOUNTER — INFUSION THERAPY VISIT (OUTPATIENT)
Dept: INFUSION THERAPY | Facility: CLINIC | Age: 59
End: 2024-06-17
Attending: INTERNAL MEDICINE
Payer: COMMERCIAL

## 2024-06-17 VITALS
TEMPERATURE: 98.4 F | HEART RATE: 56 BPM | RESPIRATION RATE: 16 BRPM | DIASTOLIC BLOOD PRESSURE: 82 MMHG | OXYGEN SATURATION: 95 % | SYSTOLIC BLOOD PRESSURE: 154 MMHG

## 2024-06-17 DIAGNOSIS — M81.8 OTHER OSTEOPOROSIS WITHOUT CURRENT PATHOLOGICAL FRACTURE: Primary | ICD-10-CM

## 2024-06-17 DIAGNOSIS — Z94.2 S/P LUNG TRANSPLANT (H): ICD-10-CM

## 2024-06-17 PROCEDURE — 96365 THER/PROPH/DIAG IV INF INIT: CPT

## 2024-06-17 PROCEDURE — 250N000011 HC RX IP 250 OP 636: Mod: JZ | Performed by: INTERNAL MEDICINE

## 2024-06-17 RX ORDER — ALBUTEROL SULFATE 0.83 MG/ML
2.5 SOLUTION RESPIRATORY (INHALATION)
OUTPATIENT
Start: 2025-06-17

## 2024-06-17 RX ORDER — DIPHENHYDRAMINE HYDROCHLORIDE 50 MG/ML
50 INJECTION INTRAMUSCULAR; INTRAVENOUS
Start: 2025-06-17

## 2024-06-17 RX ORDER — ZOLEDRONIC ACID 5 MG/100ML
5 INJECTION, SOLUTION INTRAVENOUS ONCE
Status: COMPLETED | OUTPATIENT
Start: 2024-06-17 | End: 2024-06-17

## 2024-06-17 RX ORDER — METHYLPREDNISOLONE SODIUM SUCCINATE 125 MG/2ML
125 INJECTION, POWDER, LYOPHILIZED, FOR SOLUTION INTRAMUSCULAR; INTRAVENOUS
Start: 2025-06-17

## 2024-06-17 RX ORDER — HEPARIN SODIUM,PORCINE 10 UNIT/ML
5-20 VIAL (ML) INTRAVENOUS DAILY PRN
OUTPATIENT
Start: 2025-06-17

## 2024-06-17 RX ORDER — EPINEPHRINE 1 MG/ML
0.3 INJECTION, SOLUTION INTRAMUSCULAR; SUBCUTANEOUS EVERY 5 MIN PRN
OUTPATIENT
Start: 2025-06-17

## 2024-06-17 RX ORDER — ALBUTEROL SULFATE 90 UG/1
1-2 AEROSOL, METERED RESPIRATORY (INHALATION)
Start: 2025-06-17

## 2024-06-17 RX ORDER — ZOLEDRONIC ACID 5 MG/100ML
5 INJECTION, SOLUTION INTRAVENOUS ONCE
Start: 2025-06-17

## 2024-06-17 RX ORDER — HEPARIN SODIUM (PORCINE) LOCK FLUSH IV SOLN 100 UNIT/ML 100 UNIT/ML
5 SOLUTION INTRAVENOUS
OUTPATIENT
Start: 2025-06-17

## 2024-06-17 RX ADMIN — ZOLEDRONIC ACID 5 MG: 0.05 INJECTION, SOLUTION INTRAVENOUS at 15:24

## 2024-06-17 NOTE — PROGRESS NOTES
Nursing Note  Edson Thornton JrJaneth presents today to [facility] for:   Chief Complaint   Patient presents with    Infusion     Reclast     During today's [facility] appointment, orders from Dr. Huntley were completed.  Frequency: yearly    Progress note:  Patient identification verified by name and date of birth.  Assessment completed.  Vitals recorded in Doc Flowsheets.  Patient was provided with education regarding medication/procedure and possible side effects.  Patient verbalized understanding.     present during visit today: Not Applicable.    Treatment Conditions: Patient's Creatinine Clearance is within paramaters to administer medication per orders.    Infusion length and rate:  infusion given over approximately  30 minutes    Labs: were within parameters from 6/12/24    Vascular access: peripheral IV placed today.    Is the next appt scheduled? None planned    Post Infusion Assessment:  Patient tolerated infusion without incident.     Discharge Plan:   Follow up plan of care with: ordering provider  Discharge instructions were reviewed with patient.  Patient/representative verbalized understanding of discharge instructions and all questions answered.  Patient discharged from [facility] in stable condition.    Janee Olivera RN    Administrations This Visit       zoledronic acid (RECLAST) infusion 5 mg       Admin Date  06/17/2024 Action  $New Bag Dose  5 mg Rate  200 mL/hr Route  Intravenous Documented By  Janee Olivera RN                    BP (!) 154/82 (BP Location: Left arm)   Pulse 56   Temp 98.4  F (36.9  C) (Oral)   Resp 16   SpO2 95%

## 2024-06-18 ENCOUNTER — OFFICE VISIT (OUTPATIENT)
Dept: PULMONOLOGY | Facility: CLINIC | Age: 59
End: 2024-06-18
Payer: COMMERCIAL

## 2024-06-18 ENCOUNTER — OFFICE VISIT (OUTPATIENT)
Dept: TRANSPLANT | Facility: CLINIC | Age: 59
End: 2024-06-18
Attending: INTERNAL MEDICINE
Payer: COMMERCIAL

## 2024-06-18 ENCOUNTER — ANCILLARY PROCEDURE (OUTPATIENT)
Dept: CT IMAGING | Facility: CLINIC | Age: 59
End: 2024-06-18
Attending: INTERNAL MEDICINE
Payer: COMMERCIAL

## 2024-06-18 ENCOUNTER — ANCILLARY PROCEDURE (OUTPATIENT)
Dept: GENERAL RADIOLOGY | Facility: CLINIC | Age: 59
End: 2024-06-18
Attending: INTERNAL MEDICINE
Payer: COMMERCIAL

## 2024-06-18 ENCOUNTER — LAB (OUTPATIENT)
Dept: LAB | Facility: CLINIC | Age: 59
End: 2024-06-18
Payer: COMMERCIAL

## 2024-06-18 ENCOUNTER — ANTICOAGULATION THERAPY VISIT (OUTPATIENT)
Dept: ANTICOAGULATION | Facility: CLINIC | Age: 59
End: 2024-06-18

## 2024-06-18 VITALS
BODY MASS INDEX: 26.81 KG/M2 | OXYGEN SATURATION: 94 % | SYSTOLIC BLOOD PRESSURE: 162 MMHG | TEMPERATURE: 98.3 F | HEART RATE: 58 BPM | WEIGHT: 161.1 LBS | DIASTOLIC BLOOD PRESSURE: 87 MMHG

## 2024-06-18 DIAGNOSIS — Z94.2 S/P LUNG TRANSPLANT (H): Primary | Chronic | ICD-10-CM

## 2024-06-18 DIAGNOSIS — R76.8 LOW IMMUNOGLOBULIN LEVEL: ICD-10-CM

## 2024-06-18 DIAGNOSIS — E83.42 HYPOMAGNESEMIA: ICD-10-CM

## 2024-06-18 DIAGNOSIS — Z94.2 S/P LUNG TRANSPLANT (H): Chronic | ICD-10-CM

## 2024-06-18 DIAGNOSIS — M79.652 LEFT THIGH PAIN: ICD-10-CM

## 2024-06-18 DIAGNOSIS — D84.9 IMMUNOSUPPRESSED STATUS (H): ICD-10-CM

## 2024-06-18 DIAGNOSIS — E11.65 TYPE 2 DIABETES MELLITUS WITH HYPERGLYCEMIA, WITH LONG-TERM CURRENT USE OF INSULIN (H): ICD-10-CM

## 2024-06-18 DIAGNOSIS — I10 PRIMARY HYPERTENSION: ICD-10-CM

## 2024-06-18 DIAGNOSIS — Z79.899 ENCOUNTER FOR LONG-TERM (CURRENT) USE OF HIGH-RISK MEDICATION: ICD-10-CM

## 2024-06-18 DIAGNOSIS — I48.91 ATRIAL FIBRILLATION, UNSPECIFIED TYPE (H): Primary | ICD-10-CM

## 2024-06-18 DIAGNOSIS — Z79.4 TYPE 2 DIABETES MELLITUS WITH HYPERGLYCEMIA, WITH LONG-TERM CURRENT USE OF INSULIN (H): ICD-10-CM

## 2024-06-18 DIAGNOSIS — D80.1 HYPOGAMMAGLOBULINEMIA (H): ICD-10-CM

## 2024-06-18 DIAGNOSIS — I63.9 ISCHEMIC STROKE (H): ICD-10-CM

## 2024-06-18 DIAGNOSIS — N18.31 STAGE 3A CHRONIC KIDNEY DISEASE (H): ICD-10-CM

## 2024-06-18 DIAGNOSIS — Z94.2 LUNG REPLACED BY TRANSPLANT (H): Primary | ICD-10-CM

## 2024-06-18 DIAGNOSIS — Z94.2 LUNG REPLACED BY TRANSPLANT (H): ICD-10-CM

## 2024-06-18 LAB
ALBUMIN SERPL BCG-MCNC: 4.3 G/DL (ref 3.5–5.2)
ALP SERPL-CCNC: 58 U/L (ref 40–150)
ALT SERPL W P-5'-P-CCNC: 29 U/L (ref 0–70)
ANION GAP SERPL CALCULATED.3IONS-SCNC: 10 MMOL/L (ref 7–15)
AST SERPL W P-5'-P-CCNC: 22 U/L (ref 0–45)
BILIRUB SERPL-MCNC: 0.3 MG/DL
BUN SERPL-MCNC: 16.3 MG/DL (ref 8–23)
CALCIUM SERPL-MCNC: 9.2 MG/DL (ref 8.6–10)
CHLORIDE SERPL-SCNC: 106 MMOL/L (ref 98–107)
CREAT SERPL-MCNC: 1.16 MG/DL (ref 0.67–1.17)
DEPRECATED HCO3 PLAS-SCNC: 27 MMOL/L (ref 22–29)
EGFRCR SERPLBLD CKD-EPI 2021: 73 ML/MIN/1.73M2
ERYTHROCYTE [DISTWIDTH] IN BLOOD BY AUTOMATED COUNT: 13.6 % (ref 10–15)
EXPTIME-PRE: 12.14 SEC
FEF2575-%PRED-PRE: 21 %
FEF2575-PRE: 0.53 L/SEC
FEF2575-PRED: 2.48 L/SEC
FEFMAX-%PRED-PRE: 52 %
FEFMAX-PRE: 4.2 L/SEC
FEFMAX-PRED: 8 L/SEC
FEV1-%PRED-PRE: 45 %
FEV1-PRE: 1.26 L
FEV1FEV6-PRE: 59 %
FEV1FEV6-PRED: 79 %
FEV1FVC-PRE: 56 %
FEV1FVC-PRED: 80 %
FIFMAX-PRE: 4.24 L/SEC
FVC-%PRED-PRE: 65 %
FVC-PRE: 2.26 L
FVC-PRED: 3.47 L
GLUCOSE SERPL-MCNC: 100 MG/DL (ref 70–99)
HCT VFR BLD AUTO: 43.1 % (ref 40–53)
HGB BLD-MCNC: 14.8 G/DL (ref 13.3–17.7)
INR PPP: 2.36 (ref 0.85–1.15)
MCH RBC QN AUTO: 30.8 PG (ref 26.5–33)
MCHC RBC AUTO-ENTMCNC: 34.3 G/DL (ref 31.5–36.5)
MCV RBC AUTO: 90 FL (ref 78–100)
PLATELET # BLD AUTO: 191 10E3/UL (ref 150–450)
POTASSIUM SERPL-SCNC: 4.2 MMOL/L (ref 3.4–5.3)
PROT SERPL-MCNC: 6.3 G/DL (ref 6.4–8.3)
RBC # BLD AUTO: 4.81 10E6/UL (ref 4.4–5.9)
SODIUM SERPL-SCNC: 143 MMOL/L (ref 135–145)
TACROLIMUS BLD-MCNC: 8.1 UG/L (ref 5–15)
TME LAST DOSE: NORMAL H
TME LAST DOSE: NORMAL H
WBC # BLD AUTO: 10.4 10E3/UL (ref 4–11)

## 2024-06-18 PROCEDURE — 85027 COMPLETE CBC AUTOMATED: CPT | Performed by: PATHOLOGY

## 2024-06-18 PROCEDURE — 36415 COLL VENOUS BLD VENIPUNCTURE: CPT | Performed by: PATHOLOGY

## 2024-06-18 PROCEDURE — 80197 ASSAY OF TACROLIMUS: CPT | Performed by: INTERNAL MEDICINE

## 2024-06-18 PROCEDURE — 80053 COMPREHEN METABOLIC PANEL: CPT | Performed by: PATHOLOGY

## 2024-06-18 PROCEDURE — 71250 CT THORAX DX C-: CPT | Mod: GC | Performed by: RADIOLOGY

## 2024-06-18 PROCEDURE — 94375 RESPIRATORY FLOW VOLUME LOOP: CPT | Performed by: INTERNAL MEDICINE

## 2024-06-18 PROCEDURE — 99215 OFFICE O/P EST HI 40 MIN: CPT | Mod: 25 | Performed by: INTERNAL MEDICINE

## 2024-06-18 PROCEDURE — 71046 X-RAY EXAM CHEST 2 VIEWS: CPT | Performed by: RADIOLOGY

## 2024-06-18 PROCEDURE — 99213 OFFICE O/P EST LOW 20 MIN: CPT | Performed by: INTERNAL MEDICINE

## 2024-06-18 PROCEDURE — 99417 PROLNG OP E/M EACH 15 MIN: CPT | Performed by: INTERNAL MEDICINE

## 2024-06-18 PROCEDURE — 85610 PROTHROMBIN TIME: CPT | Performed by: PATHOLOGY

## 2024-06-18 RX ORDER — CARVEDILOL PHOSPHATE 20 MG/1
20 CAPSULE, EXTENDED RELEASE ORAL DAILY
Qty: 30 CAPSULE | Refills: 11 | Status: SHIPPED | OUTPATIENT
Start: 2024-06-18 | End: 2024-07-07

## 2024-06-18 ASSESSMENT — PAIN SCALES - GENERAL: PAINLEVEL: NO PAIN (0)

## 2024-06-18 NOTE — PROGRESS NOTES
"Reason for Visit  Edson Thornton Jr. is a 59 year old year old male who is being seen for RECHECK (Follow up. When he's breathing in there is a weird \"hiccup\" thing going on. Onset 1 year. )      Assessment and plan:   Edson Thornton is a 58 year old male with NSIP/ILD, bronchiectasis, moderate PH, RA, SALTY, chronic HSV infection, hypogammaglobulinemia, steroid-induced diabetes, hypothyroidism, PFO, HTN, HLD, duodenal anomaly, anxiety, and depression. Admitted on 9/5/21 from OSH for acute on chronic respiratory failure 2/2 ILD exacerbation now s/p BSLT on 10/16/21. Prolonged vent wean s/p trach and PEG/J tube.  Post-op course also complicated by encephalopathy and diffuse weakness, acute to subacute CVA, afib with RVR, BRENNAN, GI bleed, Candidemia/Candida empyema, and anxiety. Code called 11/2 for bradycardia/asystole, progressive hypotension, found to have significant GI bleed, EGD with adherent clot near PEG tube site that was clipped.  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 His exercise tolerance and PFTs have not reached the level expected.      Pulmonary/lung transplant: The patient reports stable exercise tolerance.  No cough or sputum.  Oxygenation adequate at 94%.  Chest x-ray, reviewed by me with mildly elevated right hemidiaphragm, no acute infiltrate and no change from previous.  PFTs with a moderate obstructive ventilatory defect decreased from previous and below is fairly consistent baseline.  Etiology of the decline in PFTs is unclear.  Chest CT with inspiratory and expiratory views will be obtained today to evaluate for air-trapping.  Clinically, the patient does not appear to be infected.  CT will also permit an opportunity to assess for subtle infiltrates.  Cell free DNA will be checked today.  If elevated immunosuppression will be intensified.  PFTs will be " "rechecked in 1 month locally.  If cell free DNA and CT are unrevealing, if PFTs are persistently decreased, further evaluation including bronchoscopy will be pursued.  For now, the patient will remain on his current Myfortic at 180 mg twice daily, previously reduced due to recurrent infections and changed from CellCept due to diarrhea.  Continue current prednisone.  Tacrolimus will be adjusted to maintain a level of 8-10.  The patient notes episodes of irregular inspiration where he takes a \"stuttering\" of breath this occurs 1 breath 3-4 times daily otherwise his breathing is unremarkable.  Etiology is unclear.  Continue symptomatic monitoring.         Positive crossmatch/DSA: The patient had a retrospective positive crossmatch following transplantation, attributed to rituximab received in June 2022.  The patient did have a positive DQ B5 with a relatively low MFI in the past but no DSA since April 2023.        Date DSA mfi Biopsy (date) Treatment   10/17/2021  none         10/23/2021  none         11/1/2021  none         11/15/2021  none         11/29/2021  DQ B5  2522       12/6/2021  DQ B5  1873       12/12/2021  DQ B5  1703       12/27/2021  DQ B5  3924       1/3/2022  DQ B5  3072       1/10/2022  DQ B5  4032       1/17/2022  DQB5  1849       2/3/2022 DQB5   2488       2/7/2022 DQB5  1874       2/10/2022 DQB5  1781       3/15/2022 DQB5  2254       3/21/2022 DQB5  2117       4/18/2022 DQB5   908       6/20/2022  DQB5  1100       6/29/2022  DQB5  1411       9/12/2022 DQB5  1681       11/14/2022 DQB5  891       12/20/2022  DQB5  1553       3/8/2023  DQB5  551       4/25/2023 DQB5 None       8/1/2023 DQB5 None       11/14/23 DQB5 None       2/13/2024 None            Aspergillus/fungal infection: The patient was treated with a course of voriconazole in 2023 for Aspergillus.  Follow-up bronchoscopy grew penicillium and Fusarium but no Aspergillus.  CT was not suggestive of active fungal infection so voriconazole was " discontinued.     CKD: Renal function has improved.  Creatinine is in the normal range, at the high end of his recent baseline.  He requires a therapeutic level of tacrolimus but should avoid other nephrotoxins and maintain adequate hydration.    COVID: The patient had a COVID infection November 2023 treated with remdesivir.     Prophylaxis: Continue Bactrim for PJP and nocardia.  CMV -/-.  Continue monitoring.     Hypogammaglobulinemia: The patient has received monthly IgG infusions in the past for hypogammaglobulinemia and recurrent infections.  IgG was 547 in February.  No indication for replacement.  Continue monitoring.     Ophthalmology: History of double vision and visual loss at the time of transplant.  Now with developing cataracts.  Defer to ophthalmology for ongoing follow-up.     Rheumatoid arthritis: The patient had a recent rheumatology follow-up.  Continue hydroxychloroquine as recommended.     Atrial fibrillation with RVR: The patient appears to be in sinus rhythm on exam today.  The patient will be switched from propranolol to carvedilol for hypertension.  Continue warfarin.     Obstructive sleep apnea: The patient rarely uses his BiPAP.  He was reminded of the importance of BiPAP for reduction in hypertension and heart disease.     Hypertension: Blood pressure is elevated in clinic and on home measurements.  He is already on maximal dose of amlodipine.  Propranolol cannot be increased due to the patient's bradycardia.  Will switch from propranolol to carvedilol for better blood pressure control, starting at 20 mg daily and increase as needed.  Of note, propranolol is being used for blood pressure, A-fib and tremor.  If tremor worsens on carvedilol, will need to switch back to propranolol and identify a third agent for blood pressure control.     Depression/anxiety: Followed by psychiatry locally.  A trial off of Prozac was attempted but depression worsened so Prozac was reinitiated.     Urology:  "Continue finasteride per urology recommendations.     Reflux: Adequately controlled with current pantoprazole and famotidine.     Steroid-induced diabetes: Continue correction scale insulin     Hypothyroidism: Continue levothyroxine.     Multiple acute/subacute ischemic strokes: etiology likely embolic vs perioperative. MRI brain with infarcts noted in both cerebral hemispheres, left cerebellum, presumed associated with new Afib vs perioperative. Bilateral upper extremity paresis (R>L), suspicion for some cortical blindness. Continue warfarin, HTN and BS control and rosuvastatin.      Bone health: Continue Reclast per endocrinology.        Healthcare maintenance: The patient has received his influenza vaccine for this flu season.  He is due for his next COVID \"booster\".  Annual studies will be due again next February.     Last colonoscopy: 7/21/2020    I, Abiodun Woods, have spent 75 minutes on the day of the visit to review the chart, interview and examine the patient, review labs and imaging, formulate a plan, document and submit orders. Time documented is excluding time spent for PFT interpretation.  The longitudinal plan of care for the diagnosis(es)/condition(s) as documented were addressed during this visit. Due to the added complexity in care, I will continue to support Bret in the subsequent management and with ongoing continuity of care.  Abiodun Woods MD     Lung TX HPI  Transplants:  10/16/2021 (Lung), Postoperative day:  976    The patient was seen and examined by Abiodun Woods MD   Since his last visit, the patient had a respiratory infection in April treated with levofloxacin with resolution.  Later in April he was noted to have a decrease in oxygen saturation, chest CT was unrevealing he was treated with doxycycline and a burst and taper of prednisone.  Sputum grew Aspergillus, Fungitell was positive but galactomannan was negative, no specific treatment was initiated.  Patient " reports episodes of a irregular breathing with inspiration occurs with 1 breath about 5 or 6 times per day, almost like hiccups.  This is longstanding but may be increasing in frequency.  Breathing is comfortable at rest.  Dyspnea with moderate exertion, stable or possibly slightly increased.  The patient does a lot of walking at work, short bursts but that is the main source of exercise.  No cough.  No sputum.  No chest pain.  No fever.  He reports an occasional sense of chills which is relieved by adding extra clothing or turning up the heat occasional night sweats at baseline    Review of systems:  Appetite is good  No ear pain, sore throat, sinus pain or rhinorrhea  Slight decrease in visual acuity attributed to cataracts.  Following closely with ophthalmology.  No palpitations  No nausea, vomiting, diarrhea or abdominal pain  Ongoing pain and stiffness in his hands attributed to arthritis, waxing and waning.  Recent rheumatology visit suggested no change in medication  Easy bruising, longstanding and unchanged.  A complete ROS was otherwise negative except as noted in the HPI.    Current Outpatient Medications   Medication Sig Dispense Refill    carvedilol ER (COREG CR) 20 MG 24 hr capsule Take 1 capsule (20 mg) by mouth daily 30 capsule 11    FLUoxetine (PROZAC) 20 MG capsule Take 1 capsule (20 mg) by mouth daily      acetaminophen (TYLENOL) 325 MG tablet 3 tablets (975 mg) by Oral or Feeding Tube route every 8 hours as needed for mild pain 100 tablet 11    amLODIPine (NORVASC) 10 MG tablet Take 1 tablet (10 mg) by mouth at bedtime 30 tablet 11    Calcium Carbonate-Vitamin D (CALCIUM 600 +D HIGH POTENCY) 600-10 MG-MCG TABS 1 tablet by Oral or Feeding Tube route daily 30 tablet 11    diclofenac (VOLTAREN) 1 % topical gel Apply 4 g topically 4 times daily Both hands 150 g 11    famotidine (PEPCID) 20 MG tablet Take 20 mg by mouth daily      ferrous sulfate (FEROSUL) 325 (65 Fe) MG tablet Take 1 tablet (325 mg)  by mouth daily (with breakfast) 90 tablet 3    finasteride (PROSCAR) 5 MG tablet Take 5 mg by mouth daily      fluticasone-vilanterol (BREO ELLIPTA) 200-25 MCG/ACT inhaler Inhale 1 puff into the lungs daily 28 each 11    hydroxychloroquine (PLAQUENIL) 200 MG tablet Take 1 tablet (200 mg) by mouth 2 times daily 180 tablet 3    insulin aspart (NOVOLOG PEN) 100 UNIT/ML pen Take 1-3 units TID if BS over 200. Max daily dose is 12 15 mL 11    insulin pen needle (32G X 4 MM) 32G X 4 MM miscellaneous Use 4 pen needles daily or as directed. 120 each 11    lamoTRIgine (LAMICTAL) 25 MG tablet Take 200 mg by mouth daily      levalbuterol (XOPENEX HFA) 45 MCG/ACT inhaler INHALE 2 PUFFS BY MOUTH EVERY 4 HOURS AS NEEDED FOR WHEEZING FOR SHORTNESS OF BREATH 15 g 0    levothyroxine (SYNTHROID/LEVOTHROID) 25 MCG tablet Take 1 tablet (25 mcg) by mouth daily 30 tablet 11    magnesium glycinate 100 MG CAPS capsule Take 4 capsules (400 mg) by mouth 3 times daily      mirtazapine (REMERON) 7.5 MG tablet Take 7.5 mg by mouth At Bedtime      multivitamin w/minerals (THERA-VIT-M) tablet Take 1 tablet by mouth daily 30 tablet 11    mycophenolic acid (GENERIC EQUIVALENT) 180 MG EC tablet Take 1 tablet (180 mg) by mouth 2 times daily 60 tablet 11    ondansetron (ZOFRAN-ODT) 4 MG ODT tab Take 1 tablet (4 mg) by mouth every 6 hours as needed for nausea 30 tablet 3    pantoprazole (PROTONIX) 40 MG EC tablet Take 1 tablet (40 mg) by mouth 2 times daily (before meals) 60 tablet 11    predniSONE (DELTASONE) 2.5 MG tablet Take 1 tablet (2.5 mg) by mouth every evening Total dose: 5mg in AM and 2.5 mg in PM 30 tablet 11    predniSONE (DELTASONE) 5 MG tablet Take 1 tablet (5 mg) by mouth every morning AND 0.5 tablets (2.5 mg) every evening. 135 tablet 3    propranolol (INDERAL) 20 MG tablet Take 2 tablets (40 mg) by mouth 2 times daily 120 tablet 11    rOPINIRole (REQUIP) 0.25 MG tablet Take 0.25 mg by mouth At Bedtime      rosuvastatin (CRESTOR) 10 MG  tablet Take 1 tablet (10 mg) by mouth daily 30 tablet 11    senna-docusate (SENOKOT-S/PERICOLACE) 8.6-50 MG tablet Take 2 tablets by mouth 2 times daily as needed for constipation 120 tablet 11    sildenafil (VIAGRA) 100 MG tablet 100 mg      sulfamethoxazole-trimethoprim (BACTRIM) 400-80 MG tablet Take 1 tablet by mouth daily 30 tablet 11    tacrolimus (GENERIC EQUIVALENT) 0.5 MG capsule Total dose: 2mg in AM and 1.5 mg in PM 30 capsule 11    tacrolimus (GENERIC EQUIVALENT) 1 MG capsule Total dose: 2 mg in AM and 1.5 mg in  capsule 3    warfarin ANTICOAGULANT (COUMADIN) 1 MG tablet Take 4 tablets (4 mg) Sun, Wed, Fri; and 5 tablets (5 mg) all other days of the week or as directed by Anticoagulation Clinic. 385 tablet 1    warfarin ANTICOAGULANT (COUMADIN) 1 MG tablet Take 2 to 3 tablets daily or as directed by the Coumadin clinic 90 tablet 1     No current facility-administered medications for this visit.     No Known Allergies  Past Medical History:   Diagnosis Date    BRENNAN (acute kidney injury) (H24) 10/17/2021    Anxiety     Aspergillus (H) 03/08/2023    Depression     HLD (hyperlipidemia)     HTN (hypertension)     Hypothyroidism     ILD (interstitial lung disease) (H)     Infection due to 2019 novel coronavirus 03/2023    Lung infection 07/12/2023    SALTY on CPAP     Oxygen dependent     BL 4L since ~6/2021    Primary hypertension 02/28/2022    Rheumatoid arthritis (H)     signs ~5/2020, dx 5/2021    S/P lung transplant (H) 10/16/2021    Shock liver 10/17/2021    Steroid-induced hyperglycemia     Traction bronchiectasis (H)        Past Surgical History:   Procedure Laterality Date    BRONCHOSCOPY (RIGID OR FLEXIBLE), DIAGNOSTIC N/A 1/26/2022    Procedure: BRONCHOSCOPY, WITH BRONCHOALVEOLAR LAVAGE AND BIOPSY;  Surgeon: Perlman, David Morris, MD;  Location:  GI    BRONCHOSCOPY (RIGID OR FLEXIBLE), DIAGNOSTIC N/A 4/19/2022    Procedure: BRONCHOSCOPY, WITH BRONCHOALVEOLAR LAVAGE AND BIOPSY;  Surgeon:  Sherine Merrill MD;  Location:  GI    BRONCHOSCOPY (RIGID OR FLEXIBLE), DIAGNOSTIC N/A 6/21/2022    Procedure: BRONCHOSCOPY, WITH BRONCHOALVEOLAR LAVAGE AND BIOPSY;  Surgeon: Aneesh Rubio MD;  Location:  GI    BRONCHOSCOPY FLEXIBLE AND RIGID N/A 11/15/2023    Procedure: Surveillance Bronchoscopy with airway check;  Surgeon: Ron Daniels MD;  Location:  GI    COLONOSCOPY W/ BIOPSIES AND POLYPECTOMY  07/21/2020    CV CORONARY ANGIOGRAM N/A 09/08/2021    Procedure: Coronary Angiogram with possible intervention;  Surgeon: Jovon Bullock MD;  Location:  HEART CARDIAC CATH LAB    CV RIGHT HEART CATH MEASUREMENTS RECORDED N/A 09/08/2021    Procedure: Right Heart Cath;  Surgeon: Jovon Bullock MD;  Location:  HEART CARDIAC CATH LAB    ESOPHAGOSCOPY, GASTROSCOPY, DUODENOSCOPY (EGD), COMBINED N/A 10/23/2021    Procedure: ESOPHAGOGASTRODUODENOSCOPY (EGD);  Surgeon: Miquel Pisano MD;  Location:  GI    ESOPHAGOSCOPY, GASTROSCOPY, DUODENOSCOPY (EGD), COMBINED N/A 11/02/2021    Procedure: ESOPHAGOGASTRODUODENOSCOPY (EGD);  Surgeon: Daniel Ortiz MD;  Location:  GI    ESOPHAGOSCOPY, GASTROSCOPY, DUODENOSCOPY (EGD), COMBINED N/A 11/5/2021    Procedure: ESOPHAGOGASTRODUODENOSCOPY (EGD);  Surgeon: Ronnell Hernandez MD;  Location:  GI    EXTRACTION(S) DENTAL N/A 09/22/2021    Procedure: EXTRACTION tooth #19;  Surgeon: Deepak Tobin DDS;  Location:  OR    HERNIA REPAIR      IR CHEST TUBE PLACEMENT NON-TUNNELLED LEFT  11/03/2021    PICC TRIPLE LUMEN PLACEMENT Left 11/04/2021    5FR TL PICC. Right non occlusive thrombus subclavian vein.    REPLACE GASTROJEJUNOSTOMY TUBE, PERCUTANEOUS N/A 11/9/2021    Procedure: REPLACEMENT, GASTROJEJUNOSTOMY TUBE, PERCUTANEOUS;  Surgeon: Hernando Rodriguez MD;  Location:  GI    right acl      TRACHEOSTOMY PERCUTANEOUS N/A 10/29/2021    Procedure: Percutaneous Tracheostomy,;  Surgeon: Celine Jenkins MD;  Location: [facility] OR     TRANSPLANT LUNG RECIPIENT SINGLE X2 Bilateral 10/16/2021    Procedure: TRANSPLANT, LUNG, RECIPIENT, BILATERAL, Bronchoscopy, on-pump perfusion, bilateral clamshell sternotomy;  Surgeon: Yanick Corral MD;  Location: [facility] OR    XR ACROMIOCLAVICULAR JOINT BILATERAL         Social History     Socioeconomic History    Marital status: Single     Spouse name: Not on file    Number of children: Not on file    Years of education: Not on file    Highest education level: Not on file   Occupational History    Not on file   Tobacco Use    Smoking status: Never    Smokeless tobacco: Never   Substance and Sexual Activity    Alcohol use: Never    Drug use: Never    Sexual activity: Not on file   Other Topics Concern    Parent/sibling w/ CABG, MI or angioplasty before 65F 55M? Not Asked   Social History Narrative    Full time  for the [employer] starting May 24, 2023.     Social Determinants of Health     Financial Resource Strain: Low Risk  (9/20/2022)    [facility]    Overall Financial Resource Strain (CARDIA)     Difficulty of Paying Living Expenses: Not very hard   Food Insecurity: No Food Insecurity (9/20/2022)    [facility]    Hunger Vital Sign     Worried About Running Out of Food in the Last Year: Never true     Ran Out of Food in the Last Year: Never true   Transportation Needs: No Transportation Needs (9/20/2022)    [facility]    PRAPARE - Transportation     Lack of Transportation (Medical): No     Lack of Transportation (Non-Medical): No   Physical Activity: Insufficiently Active (9/20/2022)    [facility]    Exercise Vital Sign     Days of Exercise per Week: 4 days     Minutes of Exercise per Session: 20 min   Stress: Stress Concern Present  (8/25/2021)    [facility]    [place] [place] [employer] - Occupational Stress Questionnaire     Feeling of Stress : Rather much   Social Connections: Moderately Isolated (8/25/2021)    [facility], [facility]    Social Connection and Isolation Panel [NHANES]     Frequency of Communication with Friends and Family: Twice a week     Frequency of Social Gatherings with Friends and Family: Never     Attends Anabaptist Services: Never     Active Member of Clubs or Organizations: Yes     Attends Club or Organization Meetings: Never     Marital Status: Living with partner   Interpersonal Safety: Not on file   Housing Stability: Low Risk  (8/25/2021)    [facility], [facility]    Housing Stability Vital Sign     Unable to Pay for Housing in the Last Year: No     Number of Places Lived in the Last Year: 2     Unstable Housing in the Last Year: No         BP (!) 162/87 (BP Location: Right arm, Patient Position: Sitting, Cuff Size: Adult Regular)   Pulse 58   Temp 98.3  F (36.8  C) (Oral)   Wt 73.1 kg (161 lb 1.6 oz)   SpO2 94%   BMI 26.81 kg/m    Body mass index is 26.81 kg/m .  Exam:   GENERAL APPEARANCE: Well developed, well nourished, alert, and in no apparent distress.  EYES: PERRL, EOMI  HENT: Nasal mucosa with no edema and no hyperemia. No nasal polyps.  EARS: Canals clear, TMs normal  MOUTH: Oral mucosa is moist, without any lesions, no tonsillar enlargement, no oropharyngeal exudate.  NECK: supple, no masses, no thyromegaly.  LYMPHATICS: No significant axillary, cervical, or supraclavicular nodes.  RESP: normal percussion, good air flow throughout.  No crackles. No rhonchi. No wheezes.  CV: Normal S1, S2, regular rhythm, normal rate. No murmur.  No rub. No gallop. No LE edema.   ABDOMEN:  Bowel sounds normal, soft, nontender, no HSM or  masses.   MS: extremities normal. (+) clubbing. No cyanosis.  SKIN: no rash on limited exam  NEURO: Mentation intact, speech normal, normal strength and tone, normal gait and stance  PSYCH: mentation appears normal. and affect normal/bright  Results:  Recent Results (from the past 168 hour(s))   INR (External Result)    Collection Time: 06/12/24  7:15 AM   Result Value Ref Range    INR (External) 2.1 (A) 0.9 - 1.1   Tacrolimus by Tandem Mass Spectrometry    Collection Time: 06/12/24  7:16 AM   Result Value Ref Range    Tacrolimus by Tandem Mass Spectrometry 7.7 5.0 - 15.0 ug/L    Tacrolimus Last Dose Date 6/12/2024     Tacrolimus Last Dose Time  7:10 PM    Comprehensive metabolic panel    Collection Time: 06/12/24  7:16 AM   Result Value Ref Range    Glucose (External) 148 (H) 70 - 99 mg/dL    Urea Nitrogen (External) 15 6 - 22 mg/dL    Creatinine (External) 1.11 0.73 - 1.18 mg/dL    BUN/Creatinine Ratio (External) 13.5 10.0 - 25.0    Sodium (External) 145 136 - 145 meq/L    Potassium (External) 4.5 3.5 - 5.1 meq/L    Chloride (External) 108 98 - 109 meq/L    CO2 (External) 28 22 - 31 meq/L    Anion Gap (External) 14 6 - 20 meq/L    Calcium (External) 9.8 8.5 - 10.5 mg/dL    Protein Total (External) 6.3 6.0 - 8.3 g/dL    Albumin (External) 4.1 3.5 - 5.2 g/dL    Alk Phosphatase (External) 63 40 - 150 U/L    AST (External) 28 5 - 45 U/L    ALT (External) 41 0 - 55 U/L    Bilirubin Total (External) 0.4 0.2 - 1.2 mg/dL    GFR Estimated (External) 76 >=60 mL/min/1.73m2   Magnesium    Collection Time: 06/12/24  7:16 AM   Result Value Ref Range    Magnesium (External) 1.6 1.6 - 2.6 mg/dL   CBC with platelets    Collection Time: 06/12/24  7:16 AM   Result Value Ref Range    WBC Count (External) 8.8 4.0 - 11.0 K/uL    RBC Count (External) 4.55 4.40 - 5.80 M/uL    Hemoglobin (External) 13.8 13.5 - 17.5 g/dL    Hematocrit (External) 41.7 40.0 - 50.0 %    MCV (External) 91.6 80.0 - 98.0 fL    MCH (External) 30.3 25.5 - 34.0 pg     MCHC (External) 33.1 31.5 - 36.5 g/dL    RDW (External) 13.6 11.5 - 15.5 %    Platelet Count (External) 186 140 - 400 K/uL   Manual Differential    Collection Time: 06/12/24  7:16 AM   Result Value Ref Range    Method (External) Auto     Absolute Neutrophils (External) 5.8 1.8 - 8.0 K/uL    Absolute Lymphocytes (External) 1.6 0.8 - 4.1 K/uL    Absolute Monocytes (External) 1.0 0.0 - 1.0 K/uL    Absolute Eosinophils (External) 0.2 0.0 - 0.7 K/uL    Absolute Basophils (External) 0.0 0.0 - 0.2 K/uL    Absolute Immature Granulocytes (External) 0.17 (H) 0.00 - 0.06 K/uL    % Neutrophils (External) 65.8 %    % Lymphocytes (External) 18.2 %    % Monocytes (External) 10.9 %    % Immature Granulocytes (External) 1.9 %    % Eosinophils (External) 2.7 %    % Basophils (External) 0.5 %    Nucleated RBCs (External) 0 <=1 /100 WBC   Tacrolimus by Tandem Mass Spectrometry    Collection Time: 06/12/24  7:16 AM   Result Value Ref Range    Tacrolimus(FK-506) (External) 8.1 5.0 - 20.0 ng/mL   Cytomegalovirus DNA by PCR, Quantitative    Collection Time: 06/12/24  7:16 AM    Specimen: Blood   Result Value Ref Range    CMV DNA Quant (External) Undetected Undetected IU/mL    Log IU/ML of CMVQNT (External) Undetected log IU/mL   Juarez Barr Virus Quantitative PCR, Plasma    Collection Time: 06/12/24  7:16 AM    Specimen: Blood   Result Value Ref Range    EBV DNA IU/mL Undetected Undetected IU/mL    EBV DNA Log IU/mL Undetected log IU/mL   INR    Collection Time: 06/18/24  7:50 AM   Result Value Ref Range    INR 2.36 (H) 0.85 - 1.15   CBC with platelets    Collection Time: 06/18/24  7:50 AM   Result Value Ref Range    WBC Count 10.4 4.0 - 11.0 10e3/uL    RBC Count 4.81 4.40 - 5.90 10e6/uL    Hemoglobin 14.8 13.3 - 17.7 g/dL    Hematocrit 43.1 40.0 - 53.0 %    MCV 90 78 - 100 fL    MCH 30.8 26.5 - 33.0 pg    MCHC 34.3 31.5 - 36.5 g/dL    RDW 13.6 10.0 - 15.0 %    Platelet Count 191 150 - 450 10e3/uL   Comprehensive metabolic panel     Collection Time: 06/18/24  7:50 AM   Result Value Ref Range    Sodium 143 135 - 145 mmol/L    Potassium 4.2 3.4 - 5.3 mmol/L    Carbon Dioxide (CO2) 27 22 - 29 mmol/L    Anion Gap 10 7 - 15 mmol/L    Urea Nitrogen 16.3 8.0 - 23.0 mg/dL    Creatinine 1.16 0.67 - 1.17 mg/dL    GFR Estimate 73 >60 mL/min/1.73m2    Calcium 9.2 8.6 - 10.0 mg/dL    Chloride 106 98 - 107 mmol/L    Glucose 100 (H) 70 - 99 mg/dL    Alkaline Phosphatase 58 40 - 150 U/L    AST 22 0 - 45 U/L    ALT 29 0 - 70 U/L    Protein Total 6.3 (L) 6.4 - 8.3 g/dL    Albumin 4.3 3.5 - 5.2 g/dL    Bilirubin Total 0.3 <=1.2 mg/dL   General PFT Lab (Please always keep checked)    Collection Time: 06/18/24  7:56 AM   Result Value Ref Range    FVC-Pred 3.47 L    FVC-Pre 2.26 L    FVC-%Pred-Pre 65 %    FEV1-Pre 1.26 L    FEV1-%Pred-Pre 45 %    FEV1FVC-Pred 80 %    FEV1FVC-Pre 56 %    FEFMax-Pred 8.00 L/sec    FEFMax-Pre 4.20 L/sec    FEFMax-%Pred-Pre 52 %    FEF2575-Pred 2.48 L/sec    FEF2575-Pre 0.53 L/sec    VRY1605-%Pred-Pre 21 %    ExpTime-Pre 12.14 sec    FIFMax-Pre 4.24 L/sec    FEV1FEV6-Pred 79 %    FEV1FEV6-Pre 59 %                         Results as noted above.    PFT Interpretation:  Moderate obstructive ventilatory defect.  Decreased from previous.  Below recent best.   Valid Maneuver

## 2024-06-18 NOTE — RESULT ENCOUNTER NOTE
Tacrolimus level 8.1 at 12 hours, on 6/18/2024.  Goal 8-10.   Current dose 2 mg in AM, 1.5 mg in PM    No changes to current dose.    [email or web address] message sent

## 2024-06-18 NOTE — NURSING NOTE
"Chief Complaint   Patient presents with    RECHECK     Follow up. When he's breathing in there is a weird \"hiccup\" thing going on. Onset 1 year.      Vitals:    06/18/24 0900 06/18/24 0902   BP: (!) 154/87 (!) 162/87   BP Location: Right arm Right arm   Patient Position: Sitting Sitting   Cuff Size: Adult Regular Adult Regular   Pulse: 58    Temp: 98.3  F (36.8  C)    TempSrc: Oral    SpO2: 94%    Weight: 73.1 kg (161 lb 1.6 oz)        BP Readings from Last 3 Encounters:   06/18/24 (!) 162/87   06/17/24 (!) 154/82   04/30/24 (!) 173/82       BP (!) 162/87 (BP Location: Right arm, Patient Position: Sitting, Cuff Size: Adult Regular)   Pulse 58   Temp 98.3  F (36.8  C) (Oral)   Wt 73.1 kg (161 lb 1.6 oz)   SpO2 94%   BMI 26.81 kg/m       Brunashbrianne Medel    "

## 2024-06-18 NOTE — PATIENT INSTRUCTIONS
Patient Instructions  1. Continue to hydrate with 60-70 oz fluids daily.  2. Continue to exercise daily or most days of the week.  3. Follow up with your primary care provider for annual gender health maintenance procedures.  4. Follow up with colonoscopy schedule.  5. Follow up with annual dermatology visits.  6. It doesn't seem like the COVID vaccine is working well in lung transplant patients. A number of lung transplant patients have gotten sick with COVID even after receiving the vaccines. Based on our recent experience, it can be life-threatening to get COVID  even after being vaccinated. Please continue to act like you did not get the COVID vaccine - social distancing, wearing a mask, good hand hygiene, etc. If the people around you are vaccinated, it will help reduce the risk of you getting COVID. All members of your household should be vaccinated.  7. Continue following rheumatology for your joint pain. If they make medication changes, let us know.   8. You should use your BiPAP.   9. Repeat PFTs locally in 1 month  10. Chest CT today  11. Switch from Propranolol to Carvedilol 20mg once a day.   13. Keep an eye on your tremors - let us know if they get worse.   14. Keep an eye on your blood pressures and let us know what they're running     Next transplant clinic appointment: 3 months with CXR, labs and PFTs  Next lab draw: pending tacrolimus level, otherwise 6 weeks for transplant labs.     ~~~~~~~~~~~~~~~~~~~~~~~~~    Thoracic Transplant Office phone [phone] (alt [phone]), fax [phone]    Office Hours 8:30 - 5:00     For after-hours urgent issues, please dial [phone] (alt [phone]) and ask the  to page the Thoracic Transplant Coordinator On-Call.   --------------------  To expedite your medication refill(s), please contact your pharmacy and have them fax a refill request to: [phone]    *Please allow 3 business days for routine medication refills.  *Please allow 5  business days for controlled substance medication refills.    **For Diabetic medications and supplies refill(s), please contact your pharmacy and have them contact your Endocrine team.  --------------------  For scheduling appointments call [phone] (alt [phone])  --------------------  Please Note: If you are active on [employer], all future test results will be sent by [employer] message only, and will no longer be called to patient. You may also receive communication directly from your physician.

## 2024-06-18 NOTE — RESULT ENCOUNTER NOTE
Patient 6/18 PFTs down in clinic.   Chest CT reviewed by Dr. Woods, no air trapping.   Patient getting Prospera drawn today  Repeat PFTs locally in 1 month

## 2024-06-18 NOTE — LETTER
"6/18/2024      Edson Thornton Jr.  [address]  [place] [address]      Dear Colleague,    Thank you for referring your patient, Edson Thornton Jr., to the [facility]. Please see a copy of my visit note below.    Reason for Visit  Edson Thornton Jr. is a 59 year old year old male who is being seen for RECHECK (Follow up. When he's breathing in there is a weird \"hiccup\" thing going on. Onset 1 year. )      Assessment and plan:   Edson Thornton is a 58 year old male with NSIP/ILD, bronchiectasis, moderate PH, RA, SALTY, chronic HSV infection, hypogammaglobulinemia, steroid-induced diabetes, hypothyroidism, PFO, HTN, HLD, duodenal anomaly, anxiety, and depression. Admitted on 9/5/21 from OSH for acute on chronic respiratory failure 2/2 ILD exacerbation now s/p BSLT on 10/16/21. Prolonged vent wean s/p trach and PEG/J tube.  Post-op course also complicated by encephalopathy and diffuse weakness, acute to subacute CVA, afib with RVR, BRENNAN, GI bleed, Candidemia/Candida empyema, and anxiety. Code called 11/2 for bradycardia/asystole, progressive hypotension, found to have significant GI bleed, EGD with adherent clot near PEG tube site that was clipped.  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 His exercise tolerance and PFTs have not reached the level expected.      Pulmonary/lung transplant: The patient reports stable exercise tolerance.  No cough or sputum.  Oxygenation adequate at 94%.  Chest x-ray, reviewed by me with mildly elevated right hemidiaphragm, no acute infiltrate and no change from previous.  PFTs with a moderate obstructive ventilatory defect decreased from previous and below is fairly consistent baseline.  Etiology of the decline in PFTs is unclear.  Chest CT with inspiratory and expiratory views will be obtained today to evaluate for " "air-trapping.  Clinically, the patient does not appear to be infected.  CT will also permit an opportunity to assess for subtle infiltrates.  Cell free DNA will be checked today.  If elevated immunosuppression will be intensified.  PFTs will be rechecked in 1 month locally.  If cell free DNA and CT are unrevealing, if PFTs are persistently decreased, further evaluation including bronchoscopy will be pursued.  For now, the patient will remain on his current Myfortic at 180 mg twice daily, previously reduced due to recurrent infections and changed from CellCept due to diarrhea.  Continue current prednisone.  Tacrolimus will be adjusted to maintain a level of 8-10.  The patient notes episodes of irregular inspiration where he takes a \"stuttering\" of breath this occurs 1 breath 3-4 times daily otherwise his breathing is unremarkable.  Etiology is unclear.  Continue symptomatic monitoring.         Positive crossmatch/DSA: The patient had a retrospective positive crossmatch following transplantation, attributed to rituximab received in June 2022.  The patient did have a positive DQ B5 with a relatively low MFI in the past but no DSA since April 2023.        Date DSA mfi Biopsy (date) Treatment   10/17/2021  none         10/23/2021  none         11/1/2021  none         11/15/2021  none         11/29/2021  DQ B5  2522       12/6/2021  DQ B5  1873       12/12/2021  DQ B5  1703       12/27/2021  DQ B5  3924       1/3/2022  DQ B5  3072       1/10/2022  DQ B5  4032       1/17/2022  DQB5  1849       2/3/2022 DQB5   2488       2/7/2022 DQB5  1874       2/10/2022 DQB5  1781       3/15/2022 DQB5  2254       3/21/2022 DQB5  2117       4/18/2022 DQB5   908       6/20/2022  DQB5  1100       6/29/2022  DQB5  1411       9/12/2022 DQB5  1681       11/14/2022 DQB5  891       12/20/2022  DQB5  1553       3/8/2023  DQB5  551       4/25/2023 DQB5 None       8/1/2023 DQB5 None       11/14/23 DQB5 None       2/13/2024 None          "   Aspergillus/fungal infection: The patient was treated with a course of voriconazole in 2023 for Aspergillus.  Follow-up bronchoscopy grew penicillium and Fusarium but no Aspergillus.  CT was not suggestive of active fungal infection so voriconazole was discontinued.     CKD: Renal function has improved.  Creatinine is in the normal range, at the high end of his recent baseline.  He requires a therapeutic level of tacrolimus but should avoid other nephrotoxins and maintain adequate hydration.    COVID: The patient had a COVID infection November 2023 treated with remdesivir.     Prophylaxis: Continue Bactrim for PJP and nocardia.  CMV -/-.  Continue monitoring.     Hypogammaglobulinemia: The patient has received monthly IgG infusions in the past for hypogammaglobulinemia and recurrent infections.  IgG was 547 in February.  No indication for replacement.  Continue monitoring.     Ophthalmology: History of double vision and visual loss at the time of transplant.  Now with developing cataracts.  Defer to ophthalmology for ongoing follow-up.     Rheumatoid arthritis: The patient had a recent rheumatology follow-up.  Continue hydroxychloroquine as recommended.     Atrial fibrillation with RVR: The patient appears to be in sinus rhythm on exam today.  The patient will be switched from propranolol to carvedilol for hypertension.  Continue warfarin.     Obstructive sleep apnea: The patient rarely uses his BiPAP.  He was reminded of the importance of BiPAP for reduction in hypertension and heart disease.     Hypertension: Blood pressure is elevated in clinic and on home measurements.  He is already on maximal dose of amlodipine.  Propranolol cannot be increased due to the patient's bradycardia.  Will switch from propranolol to carvedilol for better blood pressure control, starting at 20 mg daily and increase as needed.  Of note, propranolol is being used for blood pressure, A-fib and tremor.  If tremor worsens on carvedilol,  "will need to switch back to propranolol and identify a third agent for blood pressure control.     Depression/anxiety: Followed by psychiatry locally.  A trial off of Prozac was attempted but depression worsened so Prozac was reinitiated.     Urology: Continue finasteride per urology recommendations.     Reflux: Adequately controlled with current pantoprazole and famotidine.     Steroid-induced diabetes: Continue correction scale insulin     Hypothyroidism: Continue levothyroxine.     Multiple acute/subacute ischemic strokes: etiology likely embolic vs perioperative. MRI brain with infarcts noted in both cerebral hemispheres, left cerebellum, presumed associated with new Afib vs perioperative. Bilateral upper extremity paresis (R>L), suspicion for some cortical blindness. Continue warfarin, HTN and BS control and rosuvastatin.      Bone health: Continue Reclast per endocrinology.        Healthcare maintenance: The patient has received his influenza vaccine for this flu season.  He is due for his next COVID \"booster\".  Annual studies will be due again next February.     Last colonoscopy: 7/21/2020    I, Abiodun Woods, have spent 75 minutes on the day of the visit to review the chart, interview and examine the patient, review labs and imaging, formulate a plan, document and submit orders. Time documented is excluding time spent for PFT interpretation.  The longitudinal plan of care for the diagnosis(es)/condition(s) as documented were addressed during this visit. Due to the added complexity in care, I will continue to support [facility] in the subsequent management and with ongoing continuity of care.  Abiodun Woods MD     Lung TX HPI  Transplants:  10/16/2021 (Lung), Postoperative day:  976    The patient was seen and examined by Abiodun Woods MD   Since his last visit, the patient had a respiratory infection in April treated with levofloxacin with resolution.  Later in April he was noted to have a " decrease in oxygen saturation, chest CT was unrevealing he was treated with doxycycline and a burst and taper of prednisone.  Sputum grew Aspergillus, Fungitell was positive but galactomannan was negative, no specific treatment was initiated.  Patient reports episodes of a irregular breathing with inspiration occurs with 1 breath about 5 or 6 times per day, almost like hiccups.  This is longstanding but may be increasing in frequency.  Breathing is comfortable at rest.  Dyspnea with moderate exertion, stable or possibly slightly increased.  The patient does a lot of walking at work, short bursts but that is the main source of exercise.  No cough.  No sputum.  No chest pain.  No fever.  He reports an occasional sense of chills which is relieved by adding extra clothing or turning up the heat occasional night sweats at baseline    Review of systems:  Appetite is good  No ear pain, sore throat, sinus pain or rhinorrhea  Slight decrease in visual acuity attributed to cataracts.  Following closely with ophthalmology.  No palpitations  No nausea, vomiting, diarrhea or abdominal pain  Ongoing pain and stiffness in his hands attributed to arthritis, waxing and waning.  Recent rheumatology visit suggested no change in medication  Easy bruising, longstanding and unchanged.  A complete ROS was otherwise negative except as noted in the HPI.    Current Outpatient Medications   Medication Sig Dispense Refill     carvedilol ER (COREG CR) 20 MG 24 hr capsule Take 1 capsule (20 mg) by mouth daily 30 capsule 11     FLUoxetine (PROZAC) 20 MG capsule Take 1 capsule (20 mg) by mouth daily       acetaminophen (TYLENOL) 325 MG tablet 3 tablets (975 mg) by Oral or Feeding Tube route every 8 hours as needed for mild pain 100 tablet 11     amLODIPine (NORVASC) 10 MG tablet Take 1 tablet (10 mg) by mouth at bedtime 30 tablet 11     Calcium Carbonate-Vitamin D (CALCIUM 600 +D HIGH POTENCY) 600-10 MG-MCG TABS 1 tablet by Oral or Feeding Tube  route daily 30 tablet 11     diclofenac (VOLTAREN) 1 % topical gel Apply 4 g topically 4 times daily Both hands 150 g 11     famotidine (PEPCID) 20 MG tablet Take 20 mg by mouth daily       ferrous sulfate (FEROSUL) 325 (65 Fe) MG tablet Take 1 tablet (325 mg) by mouth daily (with breakfast) 90 tablet 3     finasteride (PROSCAR) 5 MG tablet Take 5 mg by mouth daily       fluticasone-vilanterol (BREO ELLIPTA) 200-25 MCG/ACT inhaler Inhale 1 puff into the lungs daily 28 each 11     hydroxychloroquine (PLAQUENIL) 200 MG tablet Take 1 tablet (200 mg) by mouth 2 times daily 180 tablet 3     insulin aspart (NOVOLOG PEN) 100 UNIT/ML pen Take 1-3 units TID if BS over 200. Max daily dose is 12 15 mL 11     insulin pen needle (32G X 4 MM) 32G X 4 MM miscellaneous Use 4 pen needles daily or as directed. 120 each 11     lamoTRIgine (LAMICTAL) 25 MG tablet Take 200 mg by mouth daily       levalbuterol (XOPENEX HFA) 45 MCG/ACT inhaler INHALE 2 PUFFS BY MOUTH EVERY 4 HOURS AS NEEDED FOR WHEEZING FOR SHORTNESS OF BREATH 15 g 0     levothyroxine (SYNTHROID/LEVOTHROID) 25 MCG tablet Take 1 tablet (25 mcg) by mouth daily 30 tablet 11     magnesium glycinate 100 MG CAPS capsule Take 4 capsules (400 mg) by mouth 3 times daily       mirtazapine (REMERON) 7.5 MG tablet Take 7.5 mg by mouth At Bedtime       multivitamin w/minerals (THERA-VIT-M) tablet Take 1 tablet by mouth daily 30 tablet 11     mycophenolic acid (GENERIC EQUIVALENT) 180 MG EC tablet Take 1 tablet (180 mg) by mouth 2 times daily 60 tablet 11     ondansetron (ZOFRAN-ODT) 4 MG ODT tab Take 1 tablet (4 mg) by mouth every 6 hours as needed for nausea 30 tablet 3     pantoprazole (PROTONIX) 40 MG EC tablet Take 1 tablet (40 mg) by mouth 2 times daily (before meals) 60 tablet 11     predniSONE (DELTASONE) 2.5 MG tablet Take 1 tablet (2.5 mg) by mouth every evening Total dose: 5mg in AM and 2.5 mg in PM 30 tablet 11     predniSONE (DELTASONE) 5 MG tablet Take 1 tablet (5 mg) by  mouth every morning AND 0.5 tablets (2.5 mg) every evening. 135 tablet 3     propranolol (INDERAL) 20 MG tablet Take 2 tablets (40 mg) by mouth 2 times daily 120 tablet 11     rOPINIRole (REQUIP) 0.25 MG tablet Take 0.25 mg by mouth At Bedtime       rosuvastatin (CRESTOR) 10 MG tablet Take 1 tablet (10 mg) by mouth daily 30 tablet 11     senna-docusate (SENOKOT-S/PERICOLACE) 8.6-50 MG tablet Take 2 tablets by mouth 2 times daily as needed for constipation 120 tablet 11     sildenafil (VIAGRA) 100 MG tablet 100 mg       sulfamethoxazole-trimethoprim (BACTRIM) 400-80 MG tablet Take 1 tablet by mouth daily 30 tablet 11     tacrolimus (GENERIC EQUIVALENT) 0.5 MG capsule Total dose: 2mg in AM and 1.5 mg in PM 30 capsule 11     tacrolimus (GENERIC EQUIVALENT) 1 MG capsule Total dose: 2 mg in AM and 1.5 mg in  capsule 3     warfarin ANTICOAGULANT (COUMADIN) 1 MG tablet Take 4 tablets (4 mg) Sun, Wed, Fri; and 5 tablets (5 mg) all other days of the week or as directed by Anticoagulation Clinic. 385 tablet 1     warfarin ANTICOAGULANT (COUMADIN) 1 MG tablet Take 2 to 3 tablets daily or as directed by the Coumadin clinic 90 tablet 1     No current facility-administered medications for this visit.     No Known Allergies  Past Medical History:   Diagnosis Date     BRENNAN (acute kidney injury) (H24) 10/17/2021     Anxiety      Aspergillus (H) 03/08/2023     Depression      HLD (hyperlipidemia)      HTN (hypertension)      Hypothyroidism      ILD (interstitial lung disease) (H)      Infection due to 2019 novel coronavirus 03/2023     Lung infection 07/12/2023     SALTY on CPAP      Oxygen dependent     BL 4L since ~6/2021     Primary hypertension 02/28/2022     Rheumatoid arthritis (H)     signs ~5/2020, dx 5/2021     S/P lung transplant (H) 10/16/2021     Shock liver 10/17/2021     Steroid-induced hyperglycemia      Traction bronchiectasis (H)        Past Surgical History:   Procedure Laterality Date     BRONCHOSCOPY (RIGID OR  FLEXIBLE), DIAGNOSTIC N/A 1/26/2022    Procedure: BRONCHOSCOPY, WITH BRONCHOALVEOLAR LAVAGE AND BIOPSY;  Surgeon: Perlman, David Morris, MD;  Location:  GI     BRONCHOSCOPY (RIGID OR FLEXIBLE), DIAGNOSTIC N/A 4/19/2022    Procedure: BRONCHOSCOPY, WITH BRONCHOALVEOLAR LAVAGE AND BIOPSY;  Surgeon: Sherine Merrill MD;  Location:  GI     BRONCHOSCOPY (RIGID OR FLEXIBLE), DIAGNOSTIC N/A 6/21/2022    Procedure: BRONCHOSCOPY, WITH BRONCHOALVEOLAR LAVAGE AND BIOPSY;  Surgeon: Aneesh Rubio MD;  Location:  GI     BRONCHOSCOPY FLEXIBLE AND RIGID N/A 11/15/2023    Procedure: Surveillance Bronchoscopy with airway check;  Surgeon: Ron Daniels MD;  Location:  GI     COLONOSCOPY W/ BIOPSIES AND POLYPECTOMY  07/21/2020     CV CORONARY ANGIOGRAM N/A 09/08/2021    Procedure: Coronary Angiogram with possible intervention;  Surgeon: Jovon Bullock MD;  Location:  HEART CARDIAC CATH LAB     CV RIGHT HEART CATH MEASUREMENTS RECORDED N/A 09/08/2021    Procedure: Right Heart Cath;  Surgeon: Jovon Bullock MD;  Location:  HEART CARDIAC CATH LAB     ESOPHAGOSCOPY, GASTROSCOPY, DUODENOSCOPY (EGD), COMBINED N/A 10/23/2021    Procedure: ESOPHAGOGASTRODUODENOSCOPY (EGD);  Surgeon: Miquel Pisano MD;  Location:  GI     ESOPHAGOSCOPY, GASTROSCOPY, DUODENOSCOPY (EGD), COMBINED N/A 11/02/2021    Procedure: ESOPHAGOGASTRODUODENOSCOPY (EGD);  Surgeon: Daniel Ortiz MD;  Location:  GI     ESOPHAGOSCOPY, GASTROSCOPY, DUODENOSCOPY (EGD), COMBINED N/A 11/5/2021    Procedure: ESOPHAGOGASTRODUODENOSCOPY (EGD);  Surgeon: Ronnell Hernandez MD;  Location:  GI     EXTRACTION(S) DENTAL N/A 09/22/2021    Procedure: EXTRACTION tooth #19;  Surgeon: Deepak Tobin DDS;  Location:  OR     HERNIA REPAIR       IR CHEST TUBE PLACEMENT NON-TUNNELLED LEFT  11/03/2021     PICC TRIPLE LUMEN PLACEMENT Left 11/04/2021    5FR TL PICC. Right non occlusive thrombus subclavian vein.     REPLACE  GASTROJEJUNOSTOMY TUBE, PERCUTANEOUS N/A 11/9/2021    Procedure: REPLACEMENT, GASTROJEJUNOSTOMY TUBE, PERCUTANEOUS;  Surgeon: Hernando Rodriguez MD;  Location: [facility] GI     right acl       TRACHEOSTOMY PERCUTANEOUS N/A 10/29/2021    Procedure: Percutaneous Tracheostomy,;  Surgeon: Celine Jenkins MD;  Location: [facility] OR     TRANSPLANT LUNG RECIPIENT SINGLE X2 Bilateral 10/16/2021    Procedure: TRANSPLANT, LUNG, RECIPIENT, BILATERAL, Bronchoscopy, on-pump perfusion, bilateral clamshell sternotomy;  Surgeon: Yanick Corral MD;  Location: [facility] OR     XR ACROMIOCLAVICULAR JOINT BILATERAL         Social History     Socioeconomic History     Marital status: Single     Spouse name: Not on file     Number of children: Not on file     Years of education: Not on file     Highest education level: Not on file   Occupational History     Not on file   Tobacco Use     Smoking status: Never     Smokeless tobacco: Never   Substance and Sexual Activity     Alcohol use: Never     Drug use: Never     Sexual activity: Not on file   Other Topics Concern     Parent/sibling w/ CABG, MI or angioplasty before 65F 55M? Not Asked   Social History Narrative    Full time  for the [employer] starting May 24, 2023.     Social Determinants of Health     Financial Resource Strain: Low Risk  (9/20/2022)    [facility]    Overall Financial Resource Strain (CARDIA)      Difficulty of Paying Living Expenses: Not very hard   Food Insecurity: No Food Insecurity (9/20/2022)    [facility]    Hunger Vital Sign      Worried About Running Out of Food in the Last Year: Never true      Ran Out of Food in the Last Year: Never true   Transportation Needs: No Transportation Needs (9/20/2022)    [facility]    PRAPARE - Transportation      Lack of  Transportation (Medical): No      Lack of Transportation (Non-Medical): No   Physical Activity: Insufficiently Active (9/20/2022)    [facility]    Exercise Vital Sign      Days of Exercise per Week: 4 days      Minutes of Exercise per Session: 20 min   Stress: Stress Concern Present (8/25/2021)    [facility]    [place] [employer] - Occupational Stress Questionnaire      Feeling of Stress : Rather much   Social Connections: Moderately Isolated (8/25/2021)    [facility]    Social Connection and Isolation Panel [NHANES]      Frequency of Communication with Friends and Family: Twice a week      Frequency of Social Gatherings with Friends and Family: Never      Attends Alevism Services: Never      Active Member of Clubs or Organizations: Yes      Attends Club or Organization Meetings: Never      Marital Status: Living with partner   Interpersonal Safety: Not on file   Housing Stability: Low Risk  (8/25/2021)    [facility]    Housing Stability Vital Sign      Unable to Pay for Housing in the Last Year: No      Number of Places Lived in the Last Year: 2      Unstable Housing in the Last Year: No         BP (!) 162/87 (BP Location: Right arm, Patient Position: Sitting, Cuff Size: Adult Regular)   Pulse 58   Temp 98.3  F (36.8  C) (Oral)   Wt 73.1 kg (161 lb 1.6 oz)   SpO2 94%   BMI 26.81 kg/m    Body mass index is 26.81 kg/m .  Exam:   GENERAL APPEARANCE: Well developed, well nourished, alert, and in no apparent distress.  EYES: PERRL, EOMI  HENT: Nasal mucosa with no edema and no hyperemia. No nasal polyps.  EARS: Canals clear, TMs normal  MOUTH: Oral mucosa is moist, without any lesions, no tonsillar enlargement, no oropharyngeal  exudate.  NECK: supple, no masses, no thyromegaly.  LYMPHATICS: No significant axillary, cervical, or supraclavicular nodes.  RESP: normal percussion, good air flow throughout.  No crackles. No rhonchi. No wheezes.  CV: Normal S1, S2, regular rhythm, normal rate. No murmur.  No rub. No gallop. No LE edema.   ABDOMEN:  Bowel sounds normal, soft, nontender, no HSM or masses.   MS: extremities normal. (+) clubbing. No cyanosis.  SKIN: no rash on limited exam  NEURO: Mentation intact, speech normal, normal strength and tone, normal gait and stance  PSYCH: mentation appears normal. and affect normal/bright  Results:  Recent Results (from the past 168 hour(s))   INR (External Result)    Collection Time: 06/12/24  7:15 AM   Result Value Ref Range    INR (External) 2.1 (A) 0.9 - 1.1   Tacrolimus by Tandem Mass Spectrometry    Collection Time: 06/12/24  7:16 AM   Result Value Ref Range    Tacrolimus by Tandem Mass Spectrometry 7.7 5.0 - 15.0 ug/L    Tacrolimus Last Dose Date 6/12/2024     Tacrolimus Last Dose Time  7:10 PM    Comprehensive metabolic panel    Collection Time: 06/12/24  7:16 AM   Result Value Ref Range    Glucose (External) 148 (H) 70 - 99 mg/dL    Urea Nitrogen (External) 15 6 - 22 mg/dL    Creatinine (External) 1.11 0.73 - 1.18 mg/dL    BUN/Creatinine Ratio (External) 13.5 10.0 - 25.0    Sodium (External) 145 136 - 145 meq/L    Potassium (External) 4.5 3.5 - 5.1 meq/L    Chloride (External) 108 98 - 109 meq/L    CO2 (External) 28 22 - 31 meq/L    Anion Gap (External) 14 6 - 20 meq/L    Calcium (External) 9.8 8.5 - 10.5 mg/dL    Protein Total (External) 6.3 6.0 - 8.3 g/dL    Albumin (External) 4.1 3.5 - 5.2 g/dL    Alk Phosphatase (External) 63 40 - 150 U/L    AST (External) 28 5 - 45 U/L    ALT (External) 41 0 - 55 U/L    Bilirubin Total (External) 0.4 0.2 - 1.2 mg/dL    GFR Estimated (External) 76 >=60 mL/min/1.73m2   Magnesium    Collection Time: 06/12/24  7:16 AM   Result Value Ref Range    Magnesium  (External) 1.6 1.6 - 2.6 mg/dL   CBC with platelets    Collection Time: 06/12/24  7:16 AM   Result Value Ref Range    WBC Count (External) 8.8 4.0 - 11.0 K/uL    RBC Count (External) 4.55 4.40 - 5.80 M/uL    Hemoglobin (External) 13.8 13.5 - 17.5 g/dL    Hematocrit (External) 41.7 40.0 - 50.0 %    MCV (External) 91.6 80.0 - 98.0 fL    MCH (External) 30.3 25.5 - 34.0 pg    MCHC (External) 33.1 31.5 - 36.5 g/dL    RDW (External) 13.6 11.5 - 15.5 %    Platelet Count (External) 186 140 - 400 K/uL   Manual Differential    Collection Time: 06/12/24  7:16 AM   Result Value Ref Range    Method (External) Auto     Absolute Neutrophils (External) 5.8 1.8 - 8.0 K/uL    Absolute Lymphocytes (External) 1.6 0.8 - 4.1 K/uL    Absolute Monocytes (External) 1.0 0.0 - 1.0 K/uL    Absolute Eosinophils (External) 0.2 0.0 - 0.7 K/uL    Absolute Basophils (External) 0.0 0.0 - 0.2 K/uL    Absolute Immature Granulocytes (External) 0.17 (H) 0.00 - 0.06 K/uL    % Neutrophils (External) 65.8 %    % Lymphocytes (External) 18.2 %    % Monocytes (External) 10.9 %    % Immature Granulocytes (External) 1.9 %    % Eosinophils (External) 2.7 %    % Basophils (External) 0.5 %    Nucleated RBCs (External) 0 <=1 /100 WBC   Tacrolimus by Tandem Mass Spectrometry    Collection Time: 06/12/24  7:16 AM   Result Value Ref Range    Tacrolimus(FK-506) (External) 8.1 5.0 - 20.0 ng/mL   Cytomegalovirus DNA by PCR, Quantitative    Collection Time: 06/12/24  7:16 AM    Specimen: Blood   Result Value Ref Range    CMV DNA Quant (External) Undetected Undetected IU/mL    Log IU/ML of CMVQNT (External) Undetected log IU/mL   [place] Barr Virus Quantitative PCR, Plasma    Collection Time: 06/12/24  7:16 AM    Specimen: Blood   Result Value Ref Range    EBV DNA IU/mL Undetected Undetected IU/mL    EBV DNA Log IU/mL Undetected log IU/mL   INR    Collection Time: 06/18/24  7:50 AM   Result Value Ref Range    INR 2.36 (H) 0.85 - 1.15   CBC with platelets    Collection  Time: 06/18/24  7:50 AM   Result Value Ref Range    WBC Count 10.4 4.0 - 11.0 10e3/uL    RBC Count 4.81 4.40 - 5.90 10e6/uL    Hemoglobin 14.8 13.3 - 17.7 g/dL    Hematocrit 43.1 40.0 - 53.0 %    MCV 90 78 - 100 fL    MCH 30.8 26.5 - 33.0 pg    MCHC 34.3 31.5 - 36.5 g/dL    RDW 13.6 10.0 - 15.0 %    Platelet Count 191 150 - 450 10e3/uL   Comprehensive metabolic panel    Collection Time: 06/18/24  7:50 AM   Result Value Ref Range    Sodium 143 135 - 145 mmol/L    Potassium 4.2 3.4 - 5.3 mmol/L    Carbon Dioxide (CO2) 27 22 - 29 mmol/L    Anion Gap 10 7 - 15 mmol/L    Urea Nitrogen 16.3 8.0 - 23.0 mg/dL    Creatinine 1.16 0.67 - 1.17 mg/dL    GFR Estimate 73 >60 mL/min/1.73m2    Calcium 9.2 8.6 - 10.0 mg/dL    Chloride 106 98 - 107 mmol/L    Glucose 100 (H) 70 - 99 mg/dL    Alkaline Phosphatase 58 40 - 150 U/L    AST 22 0 - 45 U/L    ALT 29 0 - 70 U/L    Protein Total 6.3 (L) 6.4 - 8.3 g/dL    Albumin 4.3 3.5 - 5.2 g/dL    Bilirubin Total 0.3 <=1.2 mg/dL   General PFT Lab (Please always keep checked)    Collection Time: 06/18/24  7:56 AM   Result Value Ref Range    FVC-Pred 3.47 L    FVC-Pre 2.26 L    FVC-%Pred-Pre 65 %    FEV1-Pre 1.26 L    FEV1-%Pred-Pre 45 %    FEV1FVC-Pred 80 %    FEV1FVC-Pre 56 %    FEFMax-Pred 8.00 L/sec    FEFMax-Pre 4.20 L/sec    FEFMax-%Pred-Pre 52 %    FEF2575-Pred 2.48 L/sec    FEF2575-Pre 0.53 L/sec    CLP2676-%Pred-Pre 21 %    ExpTime-Pre 12.14 sec    FIFMax-Pre 4.24 L/sec    FEV1FEV6-Pred 79 %    FEV1FEV6-Pre 59 %                         Results as noted above.    PFT Interpretation:  Moderate obstructive ventilatory defect.  Decreased from previous.  Below recent best.   Valid Maneuver                Transplant Coordinator Note    Reason for visit: Post lung transplant follow up visit   Coordinator: Present   Caregiver: son    Health concerns addressed today:  1. Infection in April - treated with doxycycline and prednisone.   2. Respiratory - 4-5x/day, has inspiratory gasping (like a  "hiccup) - does not seem to be triggered by anything. Otherwise feeling fine. Breathing is comfortable. No shortness of breath at rest. Gets short of breath with a flight of stairs or a lot of walking. No cough/sputum  3.  GI: appetite \"the same\", weight stable.   4. ENT: no issues, at baseline  5. Night sweats at baseline. Chills, no fever. No body aches.   6. Cardiac - no chest pain. No palpitations  7. Blind spots getting worse - saw ophthalmologist, says everything is at baseline, but start of cataracts - will need surgery in 2-3 years.   8. Saw rheumatology recently - no changes in meds.   9. Home BP: 130-160/80s  10. Last dose of Reclast yesterday, 6/17/2024 (gets annually)  11. Not using BiPAP     Activity/rehab: up ad [place], walking around at work and around the house.   Oxygen needs: room air, BiPAP NOC (uses \"off and on\")  Pain management/RX: joint/bone pain (wrist and fingers), sees rheumatology next week. Some swelling. Using Volteran Gel.   Diabetic management: on lantus, occasional novolog  Next Bronch due: 9/13/20222  CMV status: D-/R-  EBV status: D+/R+  AC/asa: on coumadin, bridged to Lovenox for bronch (lower dose per CrCl d/t bleeding post bronch x 2)  PJP prophylactic: Bactrim     COVID:  COVID-19 infection (yes/no, date of most recent positive test): most recent positive test: 11/1 - treated with IV Remdesivir   Status/instructions given about COVID-19 vaccine:      Pt Education: medications (use/dose/side effects), how/when to call coordinator, frequency of labs, s/s of infection/rejection, call prior to starting any new medications, lab/vital sign book     Health Maintenance:   Last colonoscopy: 7/2020  Next colonoscopy due: 3-5 years, 7/2023-7/2025  Dermatology: sees dermatology annual locally.   Vaccinations this visit:     Labs, CXR, PFTs reviewed with patient  Medication record reviewed and reconciled  Questions and concerns addressed    Patient Instructions  1. Continue to hydrate with 60-70 " oz fluids daily.  2. Continue to exercise daily or most days of the week.  3. Follow up with your primary care provider for annual gender health maintenance procedures.  4. Follow up with colonoscopy schedule.  5. Follow up with annual dermatology visits.  6. It doesn't seem like the COVID vaccine is working well in lung transplant patients. A number of lung transplant patients have gotten sick with COVID even after receiving the vaccines. Based on our recent experience, it can be life-threatening to get COVID  even after being vaccinated. Please continue to act like you did not get the COVID vaccine - social distancing, wearing a mask, good hand hygiene, etc. If the people around you are vaccinated, it will help reduce the risk of you getting COVID. All members of your household should be vaccinated.  7. Continue following rheumatology for your joint pain. If they make medication changes, let us know.   8. You should use your BiPAP.   9. Repeat PFTs locally in 1 month  10. Chest CT today  11. Switch from Propranolol to Carvedilol 20mg once a day.   13. Keep an eye on your tremors - let us know if they get worse.   14. Keep an eye on your blood pressures and let us know what they're running     Next transplant clinic appointment: 3 months with CXR, labs and PFTs  Next lab draw: pending tacrolimus level, otherwise 6 weeks for transplant labs.     AVS printed at time of check out        Again, thank you for allowing me to participate in the care of your patient.        Sincerely,        Abiodun Woods MD

## 2024-06-18 NOTE — PROGRESS NOTES
ANTICOAGULATION MANAGEMENT     Edson Thornton Jr. 59 year old male is on warfarin with therapeutic INR result. (Goal INR 2.0-3.0)    Recent labs: (last 7 days)     06/18/24  0750   INR 2.36*       ASSESSMENT     Source(s): Chart Review     Warfarin doses taken: Reviewed in chart  Diet: No new diet changes identified  Medication/supplement changes:   Transplant OV today: Switch from Propranolol to Carvedilol 20mg once a day.   No drug drug interaction between warfarin and carvedilol  New illness, injury, or hospitalization: No  Signs or symptoms of bleeding or clotting: No  Previous result: Therapeutic last 2(+) visits  Additional findings: None     PLAN     Recommended plan for no diet, medication or health factor changes affecting INR     Dosing Instructions: Continue your current warfarin dose with next INR in 1-2 weeks       Summary  As of 6/18/2024      Full warfarin instructions:  5 mg every Sun, Thu; 4 mg all other days   Next INR check:  7/3/2024               Detailed voice message left for Bret with dosing instructions and follow up date.   Sent [employer] message with dosing and follow up instructions    Patient using outside facility for labs    Education provided:   Please call back if any changes to your diet, medications or how you've been taking warfarin  Goal range and lab monitoring: goal range and significance of current result and Importance of following up at instructed interval  Interaction IS NOT anticipated between warfarin and carvedilol  Contact [phone] with any changes, questions or concerns.     Plan made per [facility] anticoagulation protocol    ESSIE COLÓN RN  Anticoagulation Clinic  6/18/2024    _______________________________________________________________________     Anticoagulation Episode Summary       Current INR goal:  2.0-3.0   TTR:  72.1% (1 y)   Target end date:  Indefinite   Send INR reminders to:  [facility]    Indications    Atrial fibrillation  unspecified type (H)  [I48.91]  Encounter for long-term (current) use of high-risk medication [Z79.899]  Immunosuppressed status (H24) [D84.9]  S/P lung transplant (H) [Z94.2]  Ischemic stroke (H) [I63.9]             Comments:               Anticoagulation Care Providers       Provider Role Specialty Phone number    Abiodun Woods MD Referring Pulmonary Disease [phone]

## 2024-06-18 NOTE — PROGRESS NOTES
"Transplant Coordinator Note    Reason for visit: Post lung transplant follow up visit   Coordinator: Present   Caregiver: son    Health concerns addressed today:  1. Infection in April - treated with doxycycline and prednisone.   2. Respiratory - 4-5x/day, has inspiratory gasping (like a hiccup) - does not seem to be triggered by anything. Otherwise feeling fine. Breathing is comfortable. No shortness of breath at rest. Gets short of breath with a flight of stairs or a lot of walking. No cough/sputum  3.  GI: appetite \"the same\", weight stable.   4. ENT: no issues, at baseline  5. Night sweats at baseline. Chills, no fever. No body aches.   6. Cardiac - no chest pain. No palpitations  7. Blind spots getting worse - saw ophthalmologist, says everything is at baseline, but start of cataracts - will need surgery in 2-3 years.   8. Saw rheumatology recently - no changes in meds.   9. Home BP: 130-160/80s  10. Last dose of Reclast yesterday, 6/17/2024 (gets annually)  11. Not using BiPAP     Activity/rehab: up ad [place], walking around at work and around the house.   Oxygen needs: room air, BiPAP NOC (uses \"off and on\")  Pain management/RX: joint/bone pain (wrist and fingers), sees rheumatology next week. Some swelling. Using Volteran Gel.   Diabetic management: on lantus, occasional novolog  Next Bronch due: 9/13/20222  CMV status: D-/R-  EBV status: D+/R+  AC/asa: on coumadin, bridged to Lovenox for bronch (lower dose per CrCl d/t bleeding post bronch x 2)  PJP prophylactic: Bactrim     COVID:  COVID-19 infection (yes/no, date of most recent positive test): most recent positive test: 11/1 - treated with IV Remdesivir   Status/instructions given about COVID-19 vaccine:      Pt Education: medications (use/dose/side effects), how/when to call coordinator, frequency of labs, s/s of infection/rejection, call prior to starting any new medications, lab/vital sign book     Health Maintenance:   Last colonoscopy: 7/2020  Next " colonoscopy due: 3-5 years, 7/2023-7/2025  Dermatology: sees dermatology annual locally.   Vaccinations this visit:     Labs, CXR, PFTs reviewed with patient  Medication record reviewed and reconciled  Questions and concerns addressed    Patient Instructions  1. Continue to hydrate with 60-70 oz fluids daily.  2. Continue to exercise daily or most days of the week.  3. Follow up with your primary care provider for annual gender health maintenance procedures.  4. Follow up with colonoscopy schedule.  5. Follow up with annual dermatology visits.  6. It doesn't seem like the COVID vaccine is working well in lung transplant patients. A number of lung transplant patients have gotten sick with COVID even after receiving the vaccines. Based on our recent experience, it can be life-threatening to get COVID  even after being vaccinated. Please continue to act like you did not get the COVID vaccine - social distancing, wearing a mask, good hand hygiene, etc. If the people around you are vaccinated, it will help reduce the risk of you getting COVID. All members of your household should be vaccinated.  7. Continue following rheumatology for your joint pain. If they make medication changes, let us know.   8. You should use your BiPAP.   9. Repeat PFTs locally in 1 month  10. Chest CT today  11. Switch from Propranolol to Carvedilol 20mg once a day.   13. Keep an eye on your tremors - let us know if they get worse.   14. Keep an eye on your blood pressures and let us know what they're running     Next transplant clinic appointment: 3 months with CXR, labs and PFTs  Next lab draw: pending tacrolimus level, otherwise 6 weeks for transplant labs.     AVS printed at time of check out

## 2024-06-24 ENCOUNTER — MYC MEDICAL ADVICE (OUTPATIENT)
Dept: TRANSPLANT | Facility: CLINIC | Age: 59
End: 2024-06-24
Payer: COMMERCIAL

## 2024-06-24 LAB
DONOR IDENTIFICATION: NORMAL
DQB5: 1921
DSA COMMENTS: NORMAL
DSA PRESENT: YES
DSA TEST METHOD: NORMAL
ORGAN: NORMAL
PROSPERA TRANSPLANT MONITORING: 0.24 %

## 2024-06-24 NOTE — CONFIDENTIAL NOTE
Per Dr. Woods to monitor BP and HR readings for one more week before making any other changes to BP meds.        Hello, here they are:  Wed- am 139/75  58                          - pm 138/83  57  Thus- am 142/80   61             - pm 147/84  63  Fri - am 145/81   58         -pm 143/85    57  Sat - am 147/85   64          - pm 144/85   62  Sun - am 136/78   58           - pm 148/87   66  Mon - am 144/76   65    Check in with Patient on 7/1

## 2024-06-26 ENCOUNTER — LAB (OUTPATIENT)
Dept: LAB | Facility: CLINIC | Age: 59
End: 2024-06-26
Payer: COMMERCIAL

## 2024-06-26 ENCOUNTER — ANTICOAGULATION THERAPY VISIT (OUTPATIENT)
Dept: ANTICOAGULATION | Facility: CLINIC | Age: 59
End: 2024-06-26
Payer: COMMERCIAL

## 2024-06-26 DIAGNOSIS — Z94.2 S/P LUNG TRANSPLANT (H): Chronic | ICD-10-CM

## 2024-06-26 DIAGNOSIS — I48.91 ATRIAL FIBRILLATION, UNSPECIFIED TYPE (H): Primary | ICD-10-CM

## 2024-06-26 DIAGNOSIS — D84.9 IMMUNOSUPPRESSED STATUS (H): ICD-10-CM

## 2024-06-26 DIAGNOSIS — Z94.2 LUNG REPLACED BY TRANSPLANT (H): ICD-10-CM

## 2024-06-26 DIAGNOSIS — Z79.899 ENCOUNTER FOR LONG-TERM (CURRENT) USE OF HIGH-RISK MEDICATION: ICD-10-CM

## 2024-06-26 DIAGNOSIS — I63.9 ISCHEMIC STROKE (H): ICD-10-CM

## 2024-06-26 LAB — INR (EXTERNAL): 2.2

## 2024-06-26 PROCEDURE — 80197 ASSAY OF TACROLIMUS: CPT

## 2024-06-26 NOTE — PROGRESS NOTES
ANTICOAGULATION MANAGEMENT     Edson Thorntno Jr. 59 year old male is on warfarin with therapeutic INR result. (Goal INR 2.0-3.0)    Recent labs: (last 7 days)     06/26/24  0701   INR 2.2       ASSESSMENT     Source(s): Chart Review and Patient/Caregiver Call     Warfarin doses taken: Warfarin taken as instructed  Diet: No new diet changes identified  Medication/supplement changes:  6/18/24 Propranolol changed to Carvedilol - no expected Warfarin interaction with Carvedilol; Propranolol can increase INR so could explain slight decrease in INR.  New illness, injury, or hospitalization: No  Signs or symptoms of bleeding or clotting: No  Previous result: Therapeutic last 2(+) visits  Additional findings: None       PLAN     Recommended plan for ongoing change(s) affecting INR     Dosing Instructions: Continue your current warfarin dose with next INR in 1-2 weeks - Patient stated he would have checked 7/3/24      Summary  As of 6/26/2024      Full warfarin instructions:  5 mg every Sun, Thu; 4 mg all other days   Next INR check:  7/3/2024               Telephone call with Bret who verbalizes understanding and agrees to plan    Patient using outside facility for labs    Education provided:   Goal range and lab monitoring: goal range and significance of current result  Interaction IS NOT anticipated between warfarin and Carvedilol  Importance of notifying anticoagulation clinic for: if you did not receive dosing instructions on the same day as your labs were checked    Plan made per [facility] anticoagulation protocol    Margoth Victoria RN  Anticoagulation Clinic  6/26/2024    _______________________________________________________________________     Anticoagulation Episode Summary       Current INR goal:  2.0-3.0   TTR:  72.1% (1 y)   Target end date:  Indefinite   Send INR reminders to:  [facility]    Indications    Atrial fibrillation  unspecified type (H) [I48.91]  Encounter for long-term (current) use of  high-risk medication [Z79.899]  Immunosuppressed status (H24) [D84.9]  S/P lung transplant (H) [Z94.2]  Ischemic stroke (H) [I63.9]             Comments:               Anticoagulation Care Providers       Provider Role Specialty Phone number    Abiodun Woods MD Referring Pulmonary Disease [phone]

## 2024-06-28 LAB
TACROLIMUS BLD-MCNC: 8.8 UG/L (ref 5–15)
TME LAST DOSE: NORMAL H
TME LAST DOSE: NORMAL H

## 2024-07-01 NOTE — RESULT ENCOUNTER NOTE
Alex Queen,   Your tacrolimus level was 8.8 at 12 hours on 6/26/24 which is within your goal range of 8-10. No dose change at this time. Your kidney function was a bit worse last week so please increase your fluids and recheck labs in 1 week. Please call the transplant office ([phone]) with any questions.   Thanks,   Abby

## 2024-07-03 ENCOUNTER — MYC MEDICAL ADVICE (OUTPATIENT)
Dept: TRANSPLANT | Facility: CLINIC | Age: 59
End: 2024-07-03
Payer: COMMERCIAL

## 2024-07-03 DIAGNOSIS — I10 PRIMARY HYPERTENSION: ICD-10-CM

## 2024-07-03 NOTE — PROGRESS NOTES
7/3/24: orders removed from this encounter.     Annual referral and standing lab orders signed 5/30/24.    ESSIE COLÓN RN  Anticoagulation Clinic  [phone]

## 2024-07-05 ENCOUNTER — LAB (OUTPATIENT)
Dept: LAB | Facility: CLINIC | Age: 59
End: 2024-07-05
Payer: COMMERCIAL

## 2024-07-05 DIAGNOSIS — Z94.2 LUNG REPLACED BY TRANSPLANT (H): ICD-10-CM

## 2024-07-05 PROCEDURE — 80197 ASSAY OF TACROLIMUS: CPT

## 2024-07-07 RX ORDER — CARVEDILOL PHOSPHATE 20 MG/1
40 CAPSULE, EXTENDED RELEASE ORAL DAILY
Qty: 60 CAPSULE | Refills: 11 | Status: SHIPPED | OUTPATIENT
Start: 2024-07-07 | End: 2024-07-08

## 2024-07-08 DIAGNOSIS — I10 PRIMARY HYPERTENSION: ICD-10-CM

## 2024-07-08 LAB
TACROLIMUS BLD-MCNC: 9.4 UG/L (ref 5–15)
TME LAST DOSE: NORMAL H
TME LAST DOSE: NORMAL H

## 2024-07-08 RX ORDER — CARVEDILOL PHOSPHATE 20 MG/1
40 CAPSULE, EXTENDED RELEASE ORAL DAILY
Qty: 60 CAPSULE | Refills: 11 | Status: SHIPPED | OUTPATIENT
Start: 2024-07-08

## 2024-07-08 NOTE — TELEPHONE ENCOUNTER
Pt sends in a weeks worth of BP readings     Reviewed with Dr. Woods  -increases Coreg ER to 40mg daily     Pt to send in BP readings end of next week. Pt understanding of plan

## 2024-07-10 LAB
SA 1  COMMENTS: NORMAL
SA 1 CELL: NORMAL
SA 1 TEST METHOD: NORMAL
SA 2 CELL: NORMAL
SA 2 COMMENTS: NORMAL
SA 2 TEST METHOD: NORMAL
SA1 HI RISK ABY: NORMAL
SA1 MOD RISK ABY: NORMAL
SA2 HI RISK ABY: NORMAL
SA2 MOD RISK ABY: NORMAL
UNACCEPTABLE ANTIGENS: NORMAL
UNOS CPRA: 31

## 2024-07-12 ENCOUNTER — LAB (OUTPATIENT)
Dept: LAB | Facility: CLINIC | Age: 59
End: 2024-07-12
Payer: COMMERCIAL

## 2024-07-12 ENCOUNTER — ANTICOAGULATION THERAPY VISIT (OUTPATIENT)
Dept: ANTICOAGULATION | Facility: CLINIC | Age: 59
End: 2024-07-12
Payer: COMMERCIAL

## 2024-07-12 DIAGNOSIS — Z94.2 LUNG REPLACED BY TRANSPLANT (H): ICD-10-CM

## 2024-07-12 DIAGNOSIS — Z79.899 ENCOUNTER FOR LONG-TERM (CURRENT) USE OF HIGH-RISK MEDICATION: ICD-10-CM

## 2024-07-12 DIAGNOSIS — Z94.2 S/P LUNG TRANSPLANT (H): Chronic | ICD-10-CM

## 2024-07-12 DIAGNOSIS — I48.91 ATRIAL FIBRILLATION, UNSPECIFIED TYPE (H): Primary | ICD-10-CM

## 2024-07-12 DIAGNOSIS — I63.9 ISCHEMIC STROKE (H): ICD-10-CM

## 2024-07-12 DIAGNOSIS — D84.9 IMMUNOSUPPRESSED STATUS (H): ICD-10-CM

## 2024-07-12 LAB — INR (EXTERNAL): 2.2 (ref 0.9–1.1)

## 2024-07-12 PROCEDURE — 80197 ASSAY OF TACROLIMUS: CPT

## 2024-07-12 NOTE — PROGRESS NOTES
ANTICOAGULATION MANAGEMENT     Edson Thornton Jr. 59 year old male is on warfarin with therapeutic INR result. (Goal INR 2.0-3.0)    Recent labs: (last 7 days)     07/12/24  0730   INR 2.2*       ASSESSMENT     Source(s): Chart Review and Patient/Caregiver Call     Warfarin doses taken: Warfarin taken as instructed  Diet: No new diet changes identified  Medication/supplement changes: None noted  New illness, injury, or hospitalization: No  Signs or symptoms of bleeding or clotting: No  Previous result: Therapeutic last 2(+) visits  Additional findings: None       PLAN     Recommended plan for no diet, medication or health factor changes affecting INR     Dosing Instructions: Continue your current warfarin dose with next INR in 2 weeks       Summary  As of 7/12/2024      Full warfarin instructions:  5 mg every Sun, Thu; 4 mg all other days   Next INR check:  7/26/2024               Telephone call with Etta who verbalizes understanding and agrees to plan and who agrees to plan and repeated back plan correctly    Patient using outside facility for labs    Education provided: Please call back if any changes to your diet, medications or how you've been taking warfarin    Plan made per [facility] anticoagulation protocol    Freda Bhakta RN  Anticoagulation Clinic  7/12/2024    _______________________________________________________________________     Anticoagulation Episode Summary       Current INR goal:  2.0-3.0   TTR:  72.1% (1 y)   Target end date:  Indefinite   Send INR reminders to:  [facility]    Indications    Atrial fibrillation  unspecified type (H) [I48.91]  Encounter for long-term (current) use of high-risk medication [Z79.899]  Immunosuppressed status (H24) [D84.9]  S/P lung transplant (H) [Z94.2]  Ischemic stroke (H) [I63.9]             Comments:               Anticoagulation Care Providers       Provider Role Specialty Phone number    Abiodun Woods MD Referring Pulmonary Disease  [phone]

## 2024-07-15 LAB
TACROLIMUS BLD-MCNC: 11.8 UG/L (ref 5–15)
TME LAST DOSE: NORMAL H
TME LAST DOSE: NORMAL H

## 2024-07-16 ENCOUNTER — TELEPHONE (OUTPATIENT)
Dept: TRANSPLANT | Facility: CLINIC | Age: 59
End: 2024-07-16
Payer: COMMERCIAL

## 2024-07-16 DIAGNOSIS — Z94.2 LUNG REPLACED BY TRANSPLANT (H): ICD-10-CM

## 2024-07-16 DIAGNOSIS — D84.9 IMMUNOSUPPRESSED STATUS (H): ICD-10-CM

## 2024-07-16 RX ORDER — TACROLIMUS 0.5 MG/1
0.5 CAPSULE ORAL 2 TIMES DAILY
Qty: 60 CAPSULE | Refills: 11 | Status: SHIPPED | OUTPATIENT
Start: 2024-07-16 | End: 2024-08-07

## 2024-07-16 RX ORDER — TACROLIMUS 1 MG/1
CAPSULE ORAL
Qty: 360 CAPSULE | Refills: 3 | Status: SHIPPED | OUTPATIENT
Start: 2024-07-16 | End: 2024-08-07

## 2024-07-17 ENCOUNTER — MYC REFILL (OUTPATIENT)
Dept: TRANSPLANT | Facility: CLINIC | Age: 59
End: 2024-07-17
Payer: COMMERCIAL

## 2024-07-17 DIAGNOSIS — E61.1 IRON DEFICIENCY: ICD-10-CM

## 2024-07-17 RX ORDER — FERROUS SULFATE 325(65) MG
325 TABLET ORAL
Qty: 90 TABLET | Refills: 3 | Status: SHIPPED | OUTPATIENT
Start: 2024-07-17

## 2024-07-23 ENCOUNTER — LAB (OUTPATIENT)
Dept: LAB | Facility: CLINIC | Age: 59
End: 2024-07-23
Payer: COMMERCIAL

## 2024-07-23 DIAGNOSIS — Z94.2 LUNG REPLACED BY TRANSPLANT (H): ICD-10-CM

## 2024-07-23 PROCEDURE — 80197 ASSAY OF TACROLIMUS: CPT

## 2024-07-25 LAB
TACROLIMUS BLD-MCNC: 9.3 UG/L (ref 5–15)
TME LAST DOSE: NORMAL H
TME LAST DOSE: NORMAL H

## 2024-07-29 ENCOUNTER — TELEPHONE (OUTPATIENT)
Dept: TRANSPLANT | Facility: CLINIC | Age: 59
End: 2024-07-29
Payer: COMMERCIAL

## 2024-07-29 NOTE — TELEPHONE ENCOUNTER
Patient Call: General  Route to LPN    Reason for call: Magali from [facility], returning Coordinator's call no details was provided    Call back needed? Yes    Return Call Needed  Same as documented in contacts section  When to return call?: Same day: Route High Priority

## 2024-07-29 NOTE — TELEPHONE ENCOUNTER
Writer [facility] verified spirometry orders were received.

## 2024-08-01 ENCOUNTER — TELEPHONE (OUTPATIENT)
Dept: ANTICOAGULATION | Facility: CLINIC | Age: 59
End: 2024-08-01
Payer: COMMERCIAL

## 2024-08-01 NOTE — TELEPHONE ENCOUNTER
ANTICOAGULATION     Edson Thornton Jr. is overdue for an INR check.     Patient had labs on 7/23/24.  INR was not drawn.  Patient will have labs on 8/5/24 with other lab work    Terrie Cedillo RN

## 2024-08-02 ENCOUNTER — MYC REFILL (OUTPATIENT)
Dept: TRANSPLANT | Facility: CLINIC | Age: 59
End: 2024-08-02
Payer: COMMERCIAL

## 2024-08-02 DIAGNOSIS — Z94.2 S/P LUNG TRANSPLANT (H): ICD-10-CM

## 2024-08-02 RX ORDER — PREDNISONE 2.5 MG/1
2.5 TABLET ORAL EVERY EVENING
Qty: 30 TABLET | Refills: 11 | Status: SHIPPED | OUTPATIENT
Start: 2024-08-02

## 2024-08-05 ENCOUNTER — LAB (OUTPATIENT)
Dept: LAB | Facility: CLINIC | Age: 59
End: 2024-08-05
Payer: COMMERCIAL

## 2024-08-05 ENCOUNTER — TELEPHONE (OUTPATIENT)
Dept: ANTICOAGULATION | Facility: CLINIC | Age: 59
End: 2024-08-05
Payer: COMMERCIAL

## 2024-08-05 ENCOUNTER — ANTICOAGULATION THERAPY VISIT (OUTPATIENT)
Dept: ANTICOAGULATION | Facility: CLINIC | Age: 59
End: 2024-08-05
Payer: COMMERCIAL

## 2024-08-05 DIAGNOSIS — I48.91 ATRIAL FIBRILLATION, UNSPECIFIED TYPE (H): Primary | ICD-10-CM

## 2024-08-05 DIAGNOSIS — D84.9 IMMUNOSUPPRESSED STATUS (H): ICD-10-CM

## 2024-08-05 DIAGNOSIS — Z94.2 LUNG REPLACED BY TRANSPLANT (H): ICD-10-CM

## 2024-08-05 DIAGNOSIS — Z94.2 S/P LUNG TRANSPLANT (H): Chronic | ICD-10-CM

## 2024-08-05 DIAGNOSIS — E11.65 TYPE 2 DIABETES MELLITUS WITH HYPERGLYCEMIA, WITH LONG-TERM CURRENT USE OF INSULIN (H): ICD-10-CM

## 2024-08-05 DIAGNOSIS — Z79.899 ENCOUNTER FOR LONG-TERM (CURRENT) USE OF HIGH-RISK MEDICATION: ICD-10-CM

## 2024-08-05 DIAGNOSIS — I63.9 ISCHEMIC STROKE (H): ICD-10-CM

## 2024-08-05 DIAGNOSIS — N18.31 STAGE 3A CHRONIC KIDNEY DISEASE (H): ICD-10-CM

## 2024-08-05 DIAGNOSIS — Z79.4 TYPE 2 DIABETES MELLITUS WITH HYPERGLYCEMIA, WITH LONG-TERM CURRENT USE OF INSULIN (H): ICD-10-CM

## 2024-08-05 LAB — INR (EXTERNAL): 1.6 (ref 0.9–1.1)

## 2024-08-05 PROCEDURE — 80197 ASSAY OF TACROLIMUS: CPT

## 2024-08-05 NOTE — TELEPHONE ENCOUNTER
Patient Returning Call    Reason for call:  INR/anti coag dosing    Information relayed to patient:  Someone will reach back out to you    Patient has additional questions:  No      Could we send this information to you in [place] or would you prefer to receive a phone call?:   Patient would prefer a phone call   Okay to leave a detailed message?: Yes at Cell number on file:    Telephone Information:   Mobile [phone]

## 2024-08-05 NOTE — PROGRESS NOTES
0 (no pain/absence of nonverbal indicators of pain) Thoracic Surgery progress note     S/p multiple trach exchanges yesterday for persistent cuff leaks.  Finally with 6 Bivona in place     No cuff leak currently    Otherwise awake, alert  RRR tele   nondistended abd     Would agree with bronchoscopy today, defer to CVICU/Transplant Pulm team     D/w fellow Dr. Taylor Allan MD  Surgery Resident PGY-3  Pg [phone]

## 2024-08-05 NOTE — PROGRESS NOTES
ANTICOAGULATION MANAGEMENT     Edson Thornton Jr. 59 year old male is on warfarin with subtherapeutic INR result. (Goal INR 2.0-3.0)    Recent labs: (last 7 days)     08/05/24  0000   INR 1.6*       ASSESSMENT     Source(s): Chart Review  Previous INR was Therapeutic last 2(+) visits  Medication, diet, health changes since last INR chart reviewed; none identified        MyChart assessment sent - advised a booster tonight       PLAN     Unable to reach pt today.    VM left to call [facility] back. Will try again later.     Follow up required to confirm warfarin dose taken and assess for changes    Tammie Villasenor, RN  Anticoagulation Clinic  8/5/2024

## 2024-08-05 NOTE — PROGRESS NOTES
ANTICOAGULATION MANAGEMENT     Edson Thornton Jr. 59 year old male is on warfarin with subtherapeutic INR result. (Goal INR 2.0-3.0)    Recent labs: (last 7 days)     08/05/24  0000   INR 1.6*       ASSESSMENT     Source(s): Chart Review and Patient/Caregiver Call     Warfarin doses taken: Missed dose(s) may be affecting INR  Diet: No new diet changes identified  Medication/supplement changes: None noted  New illness, injury, or hospitalization: No  Signs or symptoms of bleeding or clotting: No  Previous result: Therapeutic last 2(+) visits  Additional findings: None       PLAN     Recommended plan for temporary change(s) affecting INR     Dosing Instructions: booster dose then continue your current warfarin dose with next INR in 1 week       Summary  As of 8/5/2024      Full warfarin instructions:  8/5: 6 mg; Otherwise 5 mg every Sun, Thu; 4 mg all other days   Next INR check:  8/12/2024               Telephone call with Bret who verbalizes understanding and agrees to plan    Patient using outside facility for labs    Education provided: Please call back if any changes to your diet, medications or how you've been taking warfarin  Goal range and lab monitoring: goal range and significance of current result, Importance of therapeutic range, and Importance of following up at instructed interval  Symptom monitoring: monitoring for clotting signs and symptoms and monitoring for stroke signs and symptoms    Plan made per [facility] anticoagulation protocol    Tammie Villasenor, RN  Anticoagulation Clinic  8/5/2024    _______________________________________________________________________     Anticoagulation Episode Summary       Current INR goal:  2.0-3.0   TTR:  67.7% (1 y)   Target end date:  Indefinite   Send [facility]R reminders [facility]:   [facility]    Indications    Atrial fibrillation  unspecified type (H) [I48.91]  Encounter for long-term (current) use of high-risk medication [Z79.899]  Immunosuppressed status (H24)  [D84.9]  S/P lung transplant (H) [Z94.2]  Ischemic stroke (H) [I63.9]             Comments:               Anticoagulation Care Providers       Provider Role Specialty Phone number    Abiodun Woods MD Referring Pulmonary Disease [phone]

## 2024-08-07 ENCOUNTER — TELEPHONE (OUTPATIENT)
Dept: TRANSPLANT | Facility: CLINIC | Age: 59
End: 2024-08-07
Payer: COMMERCIAL

## 2024-08-07 DIAGNOSIS — D84.9 IMMUNOSUPPRESSED STATUS (H): ICD-10-CM

## 2024-08-07 DIAGNOSIS — Z94.2 LUNG REPLACED BY TRANSPLANT (H): ICD-10-CM

## 2024-08-07 LAB
TACROLIMUS BLD-MCNC: 12.6 UG/L (ref 5–15)
TME LAST DOSE: NORMAL H
TME LAST DOSE: NORMAL H

## 2024-08-07 RX ORDER — TACROLIMUS 0.5 MG/1
0.5 CAPSULE ORAL 2 TIMES DAILY
Qty: 30 CAPSULE | Refills: 11 | Status: SHIPPED | OUTPATIENT
Start: 2024-08-07 | End: 2024-08-08

## 2024-08-07 RX ORDER — TACROLIMUS 1 MG/1
CAPSULE ORAL
Qty: 360 CAPSULE | Refills: 3 | Status: SHIPPED | OUTPATIENT
Start: 2024-08-07 | End: 2024-08-08

## 2024-08-07 NOTE — TELEPHONE ENCOUNTER
Tacrolimus level 12.6 at 12 hours, on 8/5/24.  Goal 8-10.   Current dose 1.5 mg in AM, 1.5 mg in PM    Dose changed to 1.5 mg in AM, 1 mg in PM   Recheck level in 5-7    Discussed with patient    MyChart message sent     Addendum:  Patient checked medication card when he got home and states is currently taking tacrolimus 1.5mg in the AM and 2mg in the PM.     Patient will decrease dose to 1.5mg BID and level check next week.

## 2024-08-08 RX ORDER — TACROLIMUS 0.5 MG/1
0.5 CAPSULE ORAL 2 TIMES DAILY
Qty: 60 CAPSULE | Refills: 11 | Status: SHIPPED | OUTPATIENT
Start: 2024-08-08 | End: 2024-08-23

## 2024-08-08 RX ORDER — TACROLIMUS 1 MG/1
CAPSULE ORAL
Qty: 360 CAPSULE | Refills: 3 | Status: SHIPPED | OUTPATIENT
Start: 2024-08-08 | End: 2024-08-23

## 2024-08-13 ENCOUNTER — LAB (OUTPATIENT)
Dept: LAB | Facility: CLINIC | Age: 59
End: 2024-08-13
Payer: COMMERCIAL

## 2024-08-13 ENCOUNTER — ANTICOAGULATION THERAPY VISIT (OUTPATIENT)
Dept: ANTICOAGULATION | Facility: CLINIC | Age: 59
End: 2024-08-13
Payer: COMMERCIAL

## 2024-08-13 DIAGNOSIS — Z94.2 LUNG REPLACED BY TRANSPLANT (H): ICD-10-CM

## 2024-08-13 DIAGNOSIS — Z94.2 S/P LUNG TRANSPLANT (H): Chronic | ICD-10-CM

## 2024-08-13 DIAGNOSIS — D84.9 IMMUNOSUPPRESSED STATUS (H): ICD-10-CM

## 2024-08-13 DIAGNOSIS — I63.9 ISCHEMIC STROKE (H): ICD-10-CM

## 2024-08-13 DIAGNOSIS — I48.91 ATRIAL FIBRILLATION, UNSPECIFIED TYPE (H): Primary | ICD-10-CM

## 2024-08-13 DIAGNOSIS — Z79.899 ENCOUNTER FOR LONG-TERM (CURRENT) USE OF HIGH-RISK MEDICATION: ICD-10-CM

## 2024-08-13 LAB — INR (EXTERNAL): 2.5

## 2024-08-13 PROCEDURE — 80197 ASSAY OF TACROLIMUS: CPT

## 2024-08-13 NOTE — PROGRESS NOTES
ANTICOAGULATION MANAGEMENT     Edson Thornton Jr. 59 year old male is on warfarin with therapeutic INR result. (Goal INR 2.0-3.0)    Recent labs: (last 7 days)     08/13/24  0720   INR 2.5       ASSESSMENT     Source(s): Chart Review and Patient/Caregiver Call     Warfarin doses taken: Booster dose(s) recently taken which may be affecting INR and Missed dose(s) may be affecting INR-taken as instructed since last encounter   Diet: No new diet changes identified  Medication/supplement changes: None noted  New illness, injury, or hospitalization: No  Signs or symptoms of bleeding or clotting: No  Previous result: Subtherapeutic  Additional findings: None       PLAN     Recommended plan for temporary change(s) affecting INR     Dosing Instructions: Continue your current warfarin dose with next INR in 2 weeks       Summary  As of 8/13/2024      Full warfarin instructions:  5 mg every Sun, Thu; 4 mg all other days   Next INR check:  8/27/2024               Telephone call with Bret who agrees to plan and repeated back plan correctly    Patient using outside facility for labs    Education provided: Goal range and lab monitoring: goal range and significance of current result and Importance of following up at instructed interval  Contact [phone] with any changes, questions or concerns.     Plan made per [facility] anticoagulation protocol    ESSIE COLÓN RN  Anticoagulation Clinic  8/13/2024    _______________________________________________________________________     Anticoagulation Episode Summary       Current INR goal:  2.0-3.0   TTR:  66.7% (1 y)   Target end date:  Indefinite   Send INR reminders to:  [facility]    Indications    Atrial fibrillation  unspecified type (H) [I48.91]  Encounter for long-term (current) use of high-risk medication [Z79.899]  Immunosuppressed status (H24) [D84.9]  S/P lung transplant (H) [Z94.2]  Ischemic stroke (H) [I63.9]             Comments:               Anticoagulation Care  Providers       Provider Role Specialty Phone number    Abiodun Woods MD Referring Pulmonary Disease [phone]

## 2024-08-15 LAB
TACROLIMUS BLD-MCNC: 10 UG/L (ref 5–15)
TME LAST DOSE: NORMAL H
TME LAST DOSE: NORMAL H

## 2024-08-20 ENCOUNTER — LAB (OUTPATIENT)
Dept: LAB | Facility: CLINIC | Age: 59
End: 2024-08-20
Payer: COMMERCIAL

## 2024-08-20 ENCOUNTER — ANTICOAGULATION THERAPY VISIT (OUTPATIENT)
Dept: ANTICOAGULATION | Facility: CLINIC | Age: 59
End: 2024-08-20
Payer: COMMERCIAL

## 2024-08-20 DIAGNOSIS — Z94.2 S/P LUNG TRANSPLANT (H): Chronic | ICD-10-CM

## 2024-08-20 DIAGNOSIS — I63.9 ISCHEMIC STROKE (H): ICD-10-CM

## 2024-08-20 DIAGNOSIS — Z79.899 ENCOUNTER FOR LONG-TERM (CURRENT) USE OF HIGH-RISK MEDICATION: ICD-10-CM

## 2024-08-20 DIAGNOSIS — D84.9 IMMUNOSUPPRESSED STATUS (H): ICD-10-CM

## 2024-08-20 DIAGNOSIS — I48.91 ATRIAL FIBRILLATION, UNSPECIFIED TYPE (H): Primary | ICD-10-CM

## 2024-08-20 LAB — INR (EXTERNAL): 2.8

## 2024-08-20 PROCEDURE — 80197 ASSAY OF TACROLIMUS: CPT | Performed by: INTERNAL MEDICINE

## 2024-08-20 NOTE — PROGRESS NOTES
ANTICOAGULATION MANAGEMENT     Edson Thornton Jr. 59 year old male is on warfarin with therapeutic INR result. (Goal INR 2.0-3.0)    Recent labs: (last 7 days)     08/20/24  0741   INR 2.8       ASSESSMENT     Source(s): Chart Review and Patient/Caregiver Call     Warfarin doses taken: Warfarin taken as instructed  Diet: No new diet changes identified  Medication/supplement changes: None noted  New illness, injury, or hospitalization: No  Signs or symptoms of bleeding or clotting: No  Previous result: Therapeutic last visit; previously outside of goal range  Additional findings: None       PLAN     Recommended plan for no diet, medication or health factor changes affecting INR     Dosing Instructions: Continue your current warfarin dose with next INR in 1-2 weeks       Summary  As of 8/20/2024      Full warfarin instructions:  5 mg every Sun, Thu; 4 mg all other days   Next INR check:  9/3/2024               Telephone call with Bret who agrees to plan and repeated back plan correctly    Patient using outside facility for labs    Education provided: Goal range and lab monitoring: goal range and significance of current result and Importance of following up at instructed interval  Contact [phone] with any changes, questions or concerns.     Plan made per [facility] anticoagulation protocol    ESSIE COLÓN RN  Anticoagulation Clinic  8/20/2024    _______________________________________________________________________     Anticoagulation Episode Summary       Current INR goal:  2.0-3.0   TTR:  66.7% (1 y)   Target end date:  Indefinite   Send INR reminders to:  [facility]    Indications    Atrial fibrillation  unspecified type (H) [I48.91]  Encounter for long-term (current) use of high-risk medication [Z79.899]  Immunosuppressed status (H24) [D84.9]  S/P lung transplant (H) [Z94.2]  Ischemic stroke (H) [I63.9]             Comments:               Anticoagulation Care Providers       Provider Role Specialty Phone  number    Abiodun Woods MD Referring Pulmonary Disease [phone]

## 2024-08-22 LAB
TACROLIMUS BLD-MCNC: 10.5 UG/L (ref 5–15)
TME LAST DOSE: NORMAL H
TME LAST DOSE: NORMAL H

## 2024-08-23 ENCOUNTER — MYC MEDICAL ADVICE (OUTPATIENT)
Dept: TRANSPLANT | Facility: CLINIC | Age: 59
End: 2024-08-23
Payer: COMMERCIAL

## 2024-08-23 DIAGNOSIS — Z94.2 LUNG REPLACED BY TRANSPLANT (H): ICD-10-CM

## 2024-08-23 DIAGNOSIS — D84.9 IMMUNOSUPPRESSED STATUS (H): ICD-10-CM

## 2024-08-23 RX ORDER — TACROLIMUS 0.5 MG/1
0.5 CAPSULE ORAL DAILY
Qty: 30 CAPSULE | Refills: 11 | Status: SHIPPED | OUTPATIENT
Start: 2024-08-23

## 2024-08-23 RX ORDER — TACROLIMUS 1 MG/1
CAPSULE ORAL
Qty: 360 CAPSULE | Refills: 3 | Status: SHIPPED | OUTPATIENT
Start: 2024-08-23

## 2024-08-28 ENCOUNTER — LAB (OUTPATIENT)
Dept: LAB | Facility: CLINIC | Age: 59
End: 2024-08-28
Payer: COMMERCIAL

## 2024-08-28 ENCOUNTER — ANTICOAGULATION THERAPY VISIT (OUTPATIENT)
Dept: ANTICOAGULATION | Facility: CLINIC | Age: 59
End: 2024-08-28
Payer: COMMERCIAL

## 2024-08-28 DIAGNOSIS — Z94.2 LUNG REPLACED BY TRANSPLANT (H): ICD-10-CM

## 2024-08-28 DIAGNOSIS — Z79.899 ENCOUNTER FOR LONG-TERM (CURRENT) USE OF HIGH-RISK MEDICATION: ICD-10-CM

## 2024-08-28 DIAGNOSIS — I48.91 ATRIAL FIBRILLATION, UNSPECIFIED TYPE (H): Primary | ICD-10-CM

## 2024-08-28 DIAGNOSIS — G25.1 TREMOR DUE TO MULTIPLE DRUGS: ICD-10-CM

## 2024-08-28 DIAGNOSIS — Z94.2 S/P LUNG TRANSPLANT (H): Chronic | ICD-10-CM

## 2024-08-28 DIAGNOSIS — I63.9 ISCHEMIC STROKE (H): ICD-10-CM

## 2024-08-28 DIAGNOSIS — I10 PRIMARY HYPERTENSION: ICD-10-CM

## 2024-08-28 DIAGNOSIS — D84.9 IMMUNOSUPPRESSED STATUS (H): ICD-10-CM

## 2024-08-28 LAB — INR (EXTERNAL): 2.8 (ref 0.9–1.1)

## 2024-08-28 PROCEDURE — 80197 ASSAY OF TACROLIMUS: CPT

## 2024-08-28 RX ORDER — PROPRANOLOL HYDROCHLORIDE 20 MG/1
40 TABLET ORAL 2 TIMES DAILY
Qty: 120 TABLET | Refills: 11 | Status: SHIPPED | OUTPATIENT
Start: 2024-08-28 | End: 2024-09-20

## 2024-08-28 NOTE — PROGRESS NOTES
Incoming fax from  [facility]    Date of result 8/28/24    INR result 2.8    Route result to: andrew ROSALES [facility]    Jerald Garcia RN

## 2024-08-28 NOTE — LETTER
PHYSICIAN ORDERS      DATE & TIME ISSUED: 2024 10:30 AM  PATIENT NAME: Edson Thornton Jr.   : 1965     [facility] MR# [if applicable]: [id number]     DIAGNOSIS:  Lung Transplant  Z94.2    Week of 24:     Patient needs Spirometry (Pulmonary Function Testing)    Any questions please call: Alfred [phone]    Please fax these results to [phone].      .  Abiodun Woods MD  Division of Pulmonary, Allergy, Critical Care and Sleep Medicine  Professor of Medicine

## 2024-08-28 NOTE — PROGRESS NOTES
ANTICOAGULATION MANAGEMENT     Edson Thornton Jr. 59 year old male is on warfarin with therapeutic INR result. (Goal INR 2.0-3.0)    Recent labs: (last 7 days)     08/28/24  0000   INR 2.8*       ASSESSMENT     Source(s): Chart Review and Patient/Caregiver Call     Warfarin doses taken: Warfarin taken as instructed  Diet: No new diet changes identified  Medication/supplement changes: None noted  New illness, injury, or hospitalization: No  Signs or symptoms of bleeding or clotting: No  Previous result: Therapeutic last 2(+) visits  Additional findings: None       PLAN     Recommended plan for no diet, medication or health factor changes affecting INR     Dosing Instructions: Continue your current warfarin dose with next INR in 2 weeks       Summary  As of 8/28/2024      Full warfarin instructions:  5 mg every Sun, Thu; 4 mg all other days   Next INR check:  9/11/2024               Telephone call with Bret who verbalizes understanding and agrees to plan    Patient using outside facility for labs    Education provided: Goal range and lab monitoring: goal range and significance of current result and Importance of therapeutic range    Plan made per [facility] anticoagulation protocol    Jerald Garcia RN  Anticoagulation Clinic  8/28/2024    _______________________________________________________________________     Anticoagulation Episode Summary       Current INR goal:  2.0-3.0   TTR:  66.7% (1 y)   Target end date:  Indefinite   Send INR reminders to:  [facility]    Indications    Atrial fibrillation  unspecified type (H) [I48.91]  Encounter for long-term (current) use of high-risk medication [Z79.899]  Immunosuppressed status (H24) [D84.9]  S/P lung transplant (H) [Z94.2]  Ischemic stroke (H) [I63.9]             Comments:               Anticoagulation Care Providers       Provider Role Specialty Phone number    Abiodun Woods MD Referring Pulmonary Disease [phone]

## 2024-08-30 LAB
TACROLIMUS BLD-MCNC: 9.8 UG/L (ref 5–15)
TME LAST DOSE: NORMAL H
TME LAST DOSE: NORMAL H

## 2024-09-03 ENCOUNTER — TRANSFERRED RECORDS (OUTPATIENT)
Dept: HEALTH INFORMATION MANAGEMENT | Facility: CLINIC | Age: 59
End: 2024-09-03
Payer: COMMERCIAL

## 2024-09-05 PROCEDURE — 80197 ASSAY OF TACROLIMUS: CPT | Performed by: INTERNAL MEDICINE

## 2024-09-05 NOTE — RESULT ENCOUNTER NOTE
PFTs reviewed by Dr. Woods. No further intervention at this time. Repeat PFTs at clinic appointment in October

## 2024-09-07 ENCOUNTER — LAB (OUTPATIENT)
Dept: LAB | Facility: CLINIC | Age: 59
End: 2024-09-07
Payer: COMMERCIAL

## 2024-09-07 DIAGNOSIS — Z94.2 LUNG REPLACED BY TRANSPLANT (H): ICD-10-CM

## 2024-09-08 LAB
TACROLIMUS BLD-MCNC: 9.4 UG/L (ref 5–15)
TME LAST DOSE: NORMAL H
TME LAST DOSE: NORMAL H

## 2024-09-09 NOTE — RESULT ENCOUNTER NOTE
Tacrolimus level 9.4 at 12 hours, on 9/5/24.  Goal 8-10.   Current dose 1 mg in AM, 1.5 mg in PM    Level at goal.  No dose change.    [employer] message sent

## 2024-09-15 NOTE — PROGRESS NOTES
ANTICOAGULATION MANAGEMENT     Edson Thornton 56 year old male is on warfarin with subtherapeutic INR result. (Goal INR 2.0-3.0)    Recent labs: (last 7 days)     01/17/22  0853   INR 1.8*       ASSESSMENT     Source(s): Chart Review and Patient/Caregiver Call       Warfarin doses taken: Warfarin taken as instructed    Diet: No new diet changes identified    New illness, injury, or hospitalization: No    Medication/supplement changes: Per Epic, he will be done with IV diflucan tomorrow, 1/18/22    Signs or symptoms of bleeding or clotting: No    Previous INR: Therapeutic last 2(+) visits    Additional findings: None     PLAN     Recommended plan for no diet, medication or health factor changes affecting INR     Dosing Instructions:  Increase your warfarin dose (28.6% change) with next INR in 2 days       Summary  As of 1/17/2022    Full warfarin instructions:  2 mg every Mon, Thu; 1 mg all other days   Next INR check:  1/19/2022             Telephone call with Edson who verbalizes understanding and agrees to plan    Lab visit scheduled--Bret is going to recheck INR on 1/19/22 because he has a lab visit already scheduled for other labs.    Education provided: Please call back if any changes to your diet, medications or how you've been taking warfarin and Contact [phone] with any changes, questions or concerns.     Plan made with [facility] Pharmacist Usha Chau, RN  Anticoagulation Clinic  1/17/2022    _______________________________________________________________________     Anticoagulation Episode Summary     Current INR goal:  2.0-3.0   TTR:  80.9 % (1.3 wk)   Target end date:  Indefinite   Send INR reminders to:  [facility]    Indications    Atrial fibrillation  unspecified type (H) [I48.91]           Comments:  [facility] P: [phone]  Transplant Labs twice a week  Staying Local: [facility] then back to [place]         Anticoagulation Care Providers      Provider Role Specialty Phone number    Abiodun Woods MD Referring Pulmonary Disease [phone]           (E4) spontaneous

## 2024-09-19 ENCOUNTER — TELEPHONE (OUTPATIENT)
Dept: ANTICOAGULATION | Facility: CLINIC | Age: 59
End: 2024-09-19
Payer: COMMERCIAL

## 2024-09-19 NOTE — TELEPHONE ENCOUNTER
ANTICOAGULATION     Edson Thornton Jr. is overdue for an INR check.     Left message for patient to call and schedule lab appointment as soon as possible. If returning call, please schedule.     Terrie Cedillo RN  9/19/2024  Anticoagulation Clinic  [facility] for routing messages: andrew ROSALES [facility]  [facility] patient phone line: [phone]

## 2024-09-20 ENCOUNTER — MYC REFILL (OUTPATIENT)
Dept: TRANSPLANT | Facility: CLINIC | Age: 59
End: 2024-09-20
Payer: COMMERCIAL

## 2024-09-20 DIAGNOSIS — G25.1 TREMOR DUE TO MULTIPLE DRUGS: ICD-10-CM

## 2024-09-20 DIAGNOSIS — I10 PRIMARY HYPERTENSION: ICD-10-CM

## 2024-09-20 RX ORDER — PROPRANOLOL HYDROCHLORIDE 20 MG/1
40 TABLET ORAL 2 TIMES DAILY
Qty: 120 TABLET | Refills: 11 | Status: SHIPPED | OUTPATIENT
Start: 2024-09-20

## 2024-09-24 ENCOUNTER — MYC MEDICAL ADVICE (OUTPATIENT)
Dept: ANTICOAGULATION | Facility: CLINIC | Age: 59
End: 2024-09-24
Payer: COMMERCIAL

## 2024-09-25 ENCOUNTER — ANTICOAGULATION THERAPY VISIT (OUTPATIENT)
Dept: ANTICOAGULATION | Facility: CLINIC | Age: 59
End: 2024-09-25
Payer: COMMERCIAL

## 2024-09-25 DIAGNOSIS — I63.9 ISCHEMIC STROKE (H): ICD-10-CM

## 2024-09-25 DIAGNOSIS — D84.9 IMMUNOSUPPRESSED STATUS (H): ICD-10-CM

## 2024-09-25 DIAGNOSIS — Z79.899 ENCOUNTER FOR LONG-TERM (CURRENT) USE OF HIGH-RISK MEDICATION: ICD-10-CM

## 2024-09-25 DIAGNOSIS — I48.91 ATRIAL FIBRILLATION, UNSPECIFIED TYPE (H): Primary | ICD-10-CM

## 2024-09-25 DIAGNOSIS — Z94.2 S/P LUNG TRANSPLANT (H): Chronic | ICD-10-CM

## 2024-09-25 LAB — INR (EXTERNAL): 3.6

## 2024-09-25 NOTE — PROGRESS NOTES
"ANTICOAGULATION MANAGEMENT     Edson Thornton Jr. 59 year old male is on warfarin with supratherapeutic INR result. (Goal INR 2.0-3.0)    Recent labs: (last 7 days)     09/25/24  0000   INR 3.6       ASSESSMENT     Source(s): Chart Review and Patient/Caregiver Call     Warfarin doses taken: Warfarin taken as instructed  Diet:  Patient reports that he hasn't had much of an appetite the last few days  Medication/supplement changes: None noted  New illness, injury, or hospitalization: Yes: Patient reports feeling fatigued and overall \"off balance \"   Signs or symptoms of bleeding or clotting: No  Previous result: Therapeutic last 2(+) visits  Additional findings: None       PLAN     Recommended plan for temporary change(s) affecting INR     Dosing Instructions: partial hold then continue your current warfarin dose with next INR in 1-2 weeks       Summary  As of 9/25/2024      Full warfarin instructions:  9/25: 2 mg; Otherwise 5 mg every Sun, Thu; 4 mg all other days   Next INR check:  10/9/2024               Telephone call with Bret who verbalizes understanding and agrees to plan and who agrees to plan and repeated back plan correctly    Patient using outside facility for labs    Education provided: Taking warfarin: Importance of taking warfarin as instructed  Goal range and lab monitoring: goal range and significance of current result, Importance of therapeutic range, and Importance of following up at instructed interval    Plan made per [facility] anticoagulation protocol    Terrie Cedillo RN  9/25/2024  Anticoagulation Clinic  [employer] for routing messages: p [facility]  [facility] patient phone line: [phone]        _______________________________________________________________________     Anticoagulation Episode Summary       Current INR goal:  2.0-3.0   TTR:  63.2% (1 y)   Target end date:  Indefinite   Send INR reminders to:  [facility]    Indications    Atrial fibrillation  unspecified type (H) " [I48.91]  Encounter for long-term (current) use of high-risk medication [Z79.899]  Immunosuppressed status (H24) [D84.9]  S/P lung transplant (H) [Z94.2]  Ischemic stroke (H) [I63.9]             Comments:               Anticoagulation Care Providers       Provider Role Specialty Phone number    Abiodun Woods MD Referring Pulmonary Disease [phone]

## 2024-10-04 ENCOUNTER — MYC REFILL (OUTPATIENT)
Dept: PULMONOLOGY | Facility: CLINIC | Age: 59
End: 2024-10-04
Payer: COMMERCIAL

## 2024-10-04 DIAGNOSIS — Z94.2 S/P LUNG TRANSPLANT (H): ICD-10-CM

## 2024-10-04 RX ORDER — FLUTICASONE FUROATE AND VILANTEROL 200; 25 UG/1; UG/1
1 POWDER RESPIRATORY (INHALATION) DAILY
Qty: 28 EACH | Refills: 11 | Status: SHIPPED | OUTPATIENT
Start: 2024-10-04

## 2024-10-05 ENCOUNTER — HEALTH MAINTENANCE LETTER (OUTPATIENT)
Age: 59
End: 2024-10-05

## 2024-10-14 DIAGNOSIS — Z94.2 S/P LUNG TRANSPLANT (H): ICD-10-CM

## 2024-10-14 DIAGNOSIS — Z94.2 LUNG REPLACED BY TRANSPLANT (H): Primary | ICD-10-CM

## 2024-10-15 ENCOUNTER — OFFICE VISIT (OUTPATIENT)
Dept: PULMONOLOGY | Facility: CLINIC | Age: 59
End: 2024-10-15
Payer: COMMERCIAL

## 2024-10-15 ENCOUNTER — OFFICE VISIT (OUTPATIENT)
Dept: TRANSPLANT | Facility: CLINIC | Age: 59
End: 2024-10-15
Attending: INTERNAL MEDICINE
Payer: COMMERCIAL

## 2024-10-15 ENCOUNTER — LAB (OUTPATIENT)
Dept: LAB | Facility: CLINIC | Age: 59
End: 2024-10-15
Payer: COMMERCIAL

## 2024-10-15 ENCOUNTER — ANTICOAGULATION THERAPY VISIT (OUTPATIENT)
Dept: ANTICOAGULATION | Facility: CLINIC | Age: 59
End: 2024-10-15

## 2024-10-15 ENCOUNTER — ANCILLARY PROCEDURE (OUTPATIENT)
Dept: GENERAL RADIOLOGY | Facility: CLINIC | Age: 59
End: 2024-10-15
Attending: INTERNAL MEDICINE
Payer: COMMERCIAL

## 2024-10-15 VITALS
BODY MASS INDEX: 26.44 KG/M2 | WEIGHT: 158.9 LBS | OXYGEN SATURATION: 96 % | TEMPERATURE: 98.1 F | SYSTOLIC BLOOD PRESSURE: 125 MMHG | HEART RATE: 57 BPM | DIASTOLIC BLOOD PRESSURE: 80 MMHG

## 2024-10-15 DIAGNOSIS — N18.31 STAGE 3A CHRONIC KIDNEY DISEASE (H): ICD-10-CM

## 2024-10-15 DIAGNOSIS — D84.9 IMMUNOSUPPRESSED STATUS (H): ICD-10-CM

## 2024-10-15 DIAGNOSIS — K14.6 BURNING MOUTH SYNDROME: ICD-10-CM

## 2024-10-15 DIAGNOSIS — I48.91 ATRIAL FIBRILLATION, UNSPECIFIED TYPE (H): Primary | ICD-10-CM

## 2024-10-15 DIAGNOSIS — F33.41 MAJOR DEPRESSIVE DISORDER, RECURRENT EPISODE, IN PARTIAL REMISSION (H): Primary | ICD-10-CM

## 2024-10-15 DIAGNOSIS — D80.1 HYPOGAMMAGLOBULINEMIA (H): ICD-10-CM

## 2024-10-15 DIAGNOSIS — Z94.2 LUNG REPLACED BY TRANSPLANT (H): Primary | ICD-10-CM

## 2024-10-15 DIAGNOSIS — Z94.2 LUNG REPLACED BY TRANSPLANT (H): ICD-10-CM

## 2024-10-15 DIAGNOSIS — I48.91 ATRIAL FIBRILLATION, UNSPECIFIED TYPE (H): ICD-10-CM

## 2024-10-15 DIAGNOSIS — Z79.4 TYPE 2 DIABETES MELLITUS WITH HYPERGLYCEMIA, WITH LONG-TERM CURRENT USE OF INSULIN (H): ICD-10-CM

## 2024-10-15 DIAGNOSIS — Z23 NEED FOR COVID-19 VACCINE: ICD-10-CM

## 2024-10-15 DIAGNOSIS — Z94.2 S/P LUNG TRANSPLANT (H): Chronic | ICD-10-CM

## 2024-10-15 DIAGNOSIS — Z79.899 ENCOUNTER FOR LONG-TERM (CURRENT) USE OF HIGH-RISK MEDICATION: ICD-10-CM

## 2024-10-15 DIAGNOSIS — I63.9 ISCHEMIC STROKE (H): ICD-10-CM

## 2024-10-15 DIAGNOSIS — E11.65 TYPE 2 DIABETES MELLITUS WITH HYPERGLYCEMIA, WITH LONG-TERM CURRENT USE OF INSULIN (H): ICD-10-CM

## 2024-10-15 LAB
ALBUMIN SERPL BCG-MCNC: 4.5 G/DL (ref 3.5–5.2)
ALP SERPL-CCNC: 65 U/L (ref 40–150)
ALT SERPL W P-5'-P-CCNC: 56 U/L (ref 0–70)
ANION GAP SERPL CALCULATED.3IONS-SCNC: 11 MMOL/L (ref 7–15)
AST SERPL W P-5'-P-CCNC: 34 U/L (ref 0–45)
BASOPHILS # BLD MANUAL: 0 10E3/UL (ref 0–0.2)
BASOPHILS NFR BLD MANUAL: 0 %
BILIRUB SERPL-MCNC: 0.4 MG/DL
BUN SERPL-MCNC: 15 MG/DL (ref 8–23)
CALCIUM SERPL-MCNC: 9.2 MG/DL (ref 8.8–10.4)
CHLORIDE SERPL-SCNC: 106 MMOL/L (ref 98–107)
CMV DNA SPEC NAA+PROBE-ACNC: NOT DETECTED IU/ML
CREAT SERPL-MCNC: 1.2 MG/DL (ref 0.67–1.17)
EBV DNA SERPL NAA+PROBE-ACNC: NOT DETECTED IU/ML
EGFRCR SERPLBLD CKD-EPI 2021: 70 ML/MIN/1.73M2
EOSINOPHIL # BLD MANUAL: 0 10E3/UL (ref 0–0.7)
EOSINOPHIL NFR BLD MANUAL: 0 %
ERYTHROCYTE [DISTWIDTH] IN BLOOD BY AUTOMATED COUNT: 13.6 % (ref 10–15)
EXPTIME-PRE: 7.16 SEC
FEF2575-%PRED-PRE: 27 %
FEF2575-PRE: 0.69 L/SEC
FEF2575-PRED: 2.47 L/SEC
FEFMAX-%PRED-PRE: 57 %
FEFMAX-PRE: 4.59 L/SEC
FEFMAX-PRED: 7.97 L/SEC
FEV1-%PRED-PRE: 52 %
FEV1-PRE: 1.44 L
FEV1FEV6-PRE: 61 %
FEV1FEV6-PRED: 79 %
FEV1FVC-PRE: 60 %
FEV1FVC-PRED: 80 %
FIFMAX-PRE: 4.47 L/SEC
FVC-%PRED-PRE: 69 %
FVC-PRE: 2.39 L
FVC-PRED: 3.46 L
GLUCOSE SERPL-MCNC: 111 MG/DL (ref 70–99)
HCO3 SERPL-SCNC: 28 MMOL/L (ref 22–29)
HCT VFR BLD AUTO: 44.6 % (ref 40–53)
HGB BLD-MCNC: 14.9 G/DL (ref 13.3–17.7)
INR PPP: 2.76 (ref 0.85–1.15)
LYMPHOCYTES # BLD MANUAL: 1.5 10E3/UL (ref 0.8–5.3)
LYMPHOCYTES NFR BLD MANUAL: 18 %
MAGNESIUM SERPL-MCNC: 1.6 MG/DL (ref 1.7–2.3)
MCH RBC QN AUTO: 30.3 PG (ref 26.5–33)
MCHC RBC AUTO-ENTMCNC: 33.4 G/DL (ref 31.5–36.5)
MCV RBC AUTO: 91 FL (ref 78–100)
MONOCYTES # BLD MANUAL: 0.8 10E3/UL (ref 0–1.3)
MONOCYTES NFR BLD MANUAL: 10 %
NEUTROPHILS # BLD MANUAL: 5.8 10E3/UL (ref 1.6–8.3)
NEUTROPHILS NFR BLD MANUAL: 72 %
PHOSPHATE SERPL-MCNC: 3 MG/DL (ref 2.5–4.5)
PLAT MORPH BLD: NORMAL
PLATELET # BLD AUTO: 173 10E3/UL (ref 150–450)
POTASSIUM SERPL-SCNC: 4.3 MMOL/L (ref 3.4–5.3)
PROT SERPL-MCNC: 6.6 G/DL (ref 6.4–8.3)
RBC # BLD AUTO: 4.92 10E6/UL (ref 4.4–5.9)
RBC MORPH BLD: NORMAL
SODIUM SERPL-SCNC: 145 MMOL/L (ref 135–145)
TACROLIMUS BLD-MCNC: 6.1 UG/L (ref 5–15)
TME LAST DOSE: NORMAL H
TME LAST DOSE: NORMAL H
WBC # BLD AUTO: 8.1 10E3/UL (ref 4–11)

## 2024-10-15 PROCEDURE — 82784 ASSAY IGA/IGD/IGG/IGM EACH: CPT | Performed by: INTERNAL MEDICINE

## 2024-10-15 PROCEDURE — 83735 ASSAY OF MAGNESIUM: CPT | Performed by: PATHOLOGY

## 2024-10-15 PROCEDURE — 84100 ASSAY OF PHOSPHORUS: CPT | Performed by: PATHOLOGY

## 2024-10-15 PROCEDURE — 85027 COMPLETE CBC AUTOMATED: CPT | Performed by: PATHOLOGY

## 2024-10-15 PROCEDURE — 94375 RESPIRATORY FLOW VOLUME LOOP: CPT | Performed by: INTERNAL MEDICINE

## 2024-10-15 PROCEDURE — 36415 COLL VENOUS BLD VENIPUNCTURE: CPT | Performed by: PATHOLOGY

## 2024-10-15 PROCEDURE — 71046 X-RAY EXAM CHEST 2 VIEWS: CPT | Performed by: RADIOLOGY

## 2024-10-15 PROCEDURE — 250N000011 HC RX IP 250 OP 636: Performed by: INTERNAL MEDICINE

## 2024-10-15 PROCEDURE — 80197 ASSAY OF TACROLIMUS: CPT | Performed by: INTERNAL MEDICINE

## 2024-10-15 PROCEDURE — 87799 DETECT AGENT NOS DNA QUANT: CPT | Performed by: INTERNAL MEDICINE

## 2024-10-15 PROCEDURE — 86833 HLA CLASS II HIGH DEFIN QUAL: CPT | Performed by: INTERNAL MEDICINE

## 2024-10-15 PROCEDURE — 86832 HLA CLASS I HIGH DEFIN QUAL: CPT | Performed by: INTERNAL MEDICINE

## 2024-10-15 PROCEDURE — G0008 ADMIN INFLUENZA VIRUS VAC: HCPCS | Performed by: INTERNAL MEDICINE

## 2024-10-15 PROCEDURE — 90673 RIV3 VACCINE NO PRESERV IM: CPT | Performed by: INTERNAL MEDICINE

## 2024-10-15 PROCEDURE — 80053 COMPREHEN METABOLIC PANEL: CPT | Performed by: PATHOLOGY

## 2024-10-15 PROCEDURE — 99215 OFFICE O/P EST HI 40 MIN: CPT | Mod: 25 | Performed by: INTERNAL MEDICINE

## 2024-10-15 PROCEDURE — 85610 PROTHROMBIN TIME: CPT | Performed by: PATHOLOGY

## 2024-10-15 PROCEDURE — 99000 SPECIMEN HANDLING OFFICE-LAB: CPT | Performed by: PATHOLOGY

## 2024-10-15 PROCEDURE — 85007 BL SMEAR W/DIFF WBC COUNT: CPT | Performed by: PATHOLOGY

## 2024-10-15 PROCEDURE — 99213 OFFICE O/P EST LOW 20 MIN: CPT | Performed by: INTERNAL MEDICINE

## 2024-10-15 RX ORDER — CALCIUM CARBONATE/VITAMIN D3 600 MG-10
1 TABLET ORAL DAILY
COMMUNITY
Start: 2022-12-29

## 2024-10-15 RX ORDER — TAMSULOSIN HYDROCHLORIDE 0.4 MG/1
0.4 CAPSULE ORAL DAILY
COMMUNITY
Start: 2022-12-29 | End: 2024-10-15

## 2024-10-15 RX ORDER — FLUTICASONE PROPIONATE AND SALMETEROL 500; 50 UG/1; UG/1
POWDER RESPIRATORY (INHALATION)
COMMUNITY
Start: 2023-12-28 | End: 2024-10-15

## 2024-10-15 RX ADMIN — INFLUENZA A VIRUS A/WEST VIRGINIA/30/2022 (H1N1) RECOMBINANT HEMAGGLUTININ ANTIGEN, INFLUENZA A VIRUS A/MASSACHUSETTS/18/2022 (H3N2) RECOMBINANT HEMAGGLUTININ ANTIGEN, AND INFLUENZA B VIRUS B/AUSTRIA/1359417/2021 RECOMBINANT HEMAGGLUTININ ANTIGEN 0.5 ML: 45; 45; 45 INJECTION INTRAMUSCULAR at 10:22

## 2024-10-15 ASSESSMENT — PAIN SCALES - GENERAL: PAINLEVEL: NO PAIN (0)

## 2024-10-15 NOTE — LETTER
"10/15/2024      Edson Thornton Jr.  [address]  [place] [address]      Dear Colleague,    Thank you for referring your patient, Edson Thornton Jr., to the [facility]. Please see a copy of my visit note below.    Transplant Coordinator Note    Reason for visit: Post lung transplant follow up visit   Coordinator: Present (Gisel in person)  Caregiver:  Not present    Health concerns addressed today:  1. Respiratory: short of breath with activity (not new); no cough  2. ENT: no concerns  3. GI/: appetite good; no other concerns  4. Chest pain every once in a while (dull ache mid sternum) - follow up with cardiology next month; lasts few minutes to 1 hour  5. Dizziness and lightheaded at times - 1-2x per day - lasts 2-15 minutes each time - happens when sitting at desk working; usually happens when working  6. Chills intermittently (not new)  7. Working with psychiatry to adjust medications to help depression  8. Tremor worse since switching from propranolol to carvedilol  9. Saw dentist - has burning mouth syndrome - Dr. Woods looking into meds that were recommended (clonazepam and alphalapoic (sp?) acid)     Activity/rehab: up [facility]  Oxygen needs: room air, BiPAP NOC (uses \"off and on\")  Pain management/RX: joint/bone pain (wrist and fingers), sees rheumatology in November. Some swelling. Using Volteran Gel.   Diabetic management: on lantus, occasional novolog  CMV status: D-/R-  EBV status: D+/R+  AC/asa: on coumadin, bridge to Lovenox for bronch (lower dose per CrCl d/t bleeding post bronch x 2)  PJP prophylactic: Bactrim     COVID:  COVID-19 infection (yes/no, date of most recent positive test): most recent positive test: 11/1 - treated with IV Remdesivir   Status/instructions given about COVID-19 vaccine:      Pt Education: medications (use/dose/side effects), how/when to call coordinator, frequency of labs, s/s of infection/rejection, call prior to starting any new " Pt here with c/o migraine since 3/8. Pt seen at [facility] and PCP. Has been taking her prescribed medications.  +blurry vision +photosensitivty +nausea medications, lab/vital sign book     Health Maintenance:   Last colonoscopy: 7/2020  Next colonoscopy due: 3-5 years, 7/2023-7/2025  Dermatology: sees dermatology annual locally - 11/2025  Vaccinations this visit:   Dexa: due?    Labs, CXR, PFTs reviewed with patient  Medication record reviewed and reconciled  Questions and concerns addressed    Patient Instructions  1. Continue to hydrate with 60-70 oz fluids daily.  2. Continue to exercise daily or most days of the week.  Try to get some focused exercise.  3. Follow up with your primary care provider for annual gender health maintenance procedures.  4. Follow up with colonoscopy schedule.  5. Follow up with annual dermatology visits.  6. It doesn't seem like the COVID vaccine is working well in lung transplant patients. A number of lung transplant patients have gotten sick with COVID even after receiving the vaccines. Based on our recent experience, it can be life-threatening to get COVID  even after being vaccinated. Please continue to act like you did not get the COVID vaccine - social distancing, wearing a mask, good hand hygiene, etc. If the people around you are vaccinated, it will help reduce the risk of you getting COVID. All members of your household should be vaccinated.  7. If dizziness changes or you start to notice chest pain or racing heartbeat, let Gisel/Alfred know.  8. Use your Bipap regularly.  Continue to monitor your blood pressure.  If your blood pressure doesn't improve, let Gisel/Alfred know how it's running.  We'll likely need to adjust your blood pressure medications if it doesn't improve.  9. Follow up with neurology for your tremor.    Next transplant clinic appointment:  4 months with CXR, labs and PFTs  Next lab draw: pending tacrolimus dose, otherwise monthly    AVS printed at time of check out        Reason for Visit  Edson Thornton Jr. is a 59 year old year old male who is being seen for RECHECK (LUNG POST RETURN TXP PULM - 3 (4) month  follow up./)      Assessment and plan:   Edson Thornton is a 58 year old male with NSIP/ILD, bronchiectasis, moderate PH, RA, SALTY, chronic HSV infection, hypogammaglobulinemia, steroid-induced diabetes, hypothyroidism, PFO, HTN, HLD, duodenal anomaly, anxiety, and depression. Admitted on 9/5/21 from OSH for acute on chronic respiratory failure 2/2 ILD exacerbation now s/p BSLT on 10/16/21. Prolonged vent wean s/p trach and PEG/J tube.  Post-op course also complicated by encephalopathy and diffuse weakness, acute to subacute CVA, afib with RVR, BRENNAN, GI bleed, Candidemia/Candida empyema, and anxiety. Code called 11/2 for bradycardia/asystole, progressive hypotension, found to have significant GI bleed, EGD with adherent clot near PEG tube site that was clipped.  The patient had a positive crossmatch following transplant which was attributed to rituximab patient had received in June 2021 but subsequently has had intermittent positive DSA. His exercise tolerance and PFTs have not reached the level expected.      Pulmonary/lung transplant: Patient reports dyspnea with moderate exertion, similar to baseline.  Oxygenation is adequate at 96%.  Chest x-ray, reviewed by me with patchy opacities at the bases, unchanged from previous.  PFTs with moderate obstructive ventilatory defect, improved from June, slightly below previous best but in a range similar to the patient's baseline.  Continue current immunosuppression.  Change from CellCept to Myfortic due to diarrhea, reduced dose due to previous recurrent respiratory infections.  Tacrolimus will be adjusted to maintain a level of 8-10.  Cell free DNA at the last visit was low at 0.24.      Positive crossmatch/DSA: The patient had a retrospective positive crossmatch following transplantation, attributed to rituximab received in June 2022.  The patient did have a positive DQB5 November 2021 through March 2023.  It had resolved for many months but reappeared in June 2024.  At  that time his cell free DNA was quite low.  Repeat DSA and cell free DNA from today's visit is pending.  If DSA remains elevated, will consider increase in Myfortic dose.  Date DSA mfi Biopsy (date) Treatment   10/17/2021  none         10/23/2021  none         11/1/2021  none         11/15/2021  none         11/29/2021  DQ B5  2522       12/6/2021  DQ B5  1873       12/12/2021  DQ B5  1703       12/27/2021  DQ B5  3924       1/3/2022  DQ B5  3072       1/10/2022  DQ B5  4032       1/17/2022  DQB5  1849       2/3/2022 DQB5   2488       2/7/2022 DQB5  1874       2/10/2022 DQB5  1781       3/15/2022 DQB5  2254       3/21/2022 DQB5  2117       4/18/2022 DQB5   908       6/20/2022  DQB5  1100       6/29/2022  DQB5  1411       9/12/2022 DQB5  1681       11/14/2022 DQB5  891       12/20/2022  DQB5  1553       3/8/2023  DQB5  551       4/25/2023 DQB5 None       8/1/2023 DQB5 None       11/14/23 DQB5 None       2/13/2024 None         6/5/2024 DQ B5 1921              CKD: History of stage II CKD.  Creatinine is at the high end of the patient's recent range.  He requires a therapeutic dose of tacrolimus but should avoid other nephrotoxins and maintain adequate hydration.    Prophylaxis: Continue Bactrim for PJP prophylaxis.  CMV and EBV are negative from today's visit.  Continue monitoring.    Hypogammaglobulinemia: IgG was low at 298 in November 2023.  No recent replacement.  Recheck with annual studies with the next visit.  If it remains low, will need to consider reinitiating replacement.    Rheumatoid arthritis: Recent increase in joint pain.  The patient is scheduled for follow-up with rheumatology in November.    Atrial fibrillation with RVR: The patient appears to be in sinus rhythm on exam today.  Continue carvedilol and warfarin.    Obstructive sleep apnea: Patient reports using his BiPAP inconsistently.  He was encouraged to be more consistent especially with ongoing hypertension (see below).    Hypertension: Blood  "pressure is elevated in clinic today.  He reports that generally it runs 130-150/50s/80s.  Still above goal.  Pulse is generally in the 50s.  Blood pressure appears to be only moderately well-controlled with current amlodipine and carvedilol.  The patient was encouraged to be more consistent with his BiPAP to see if treating his sleep apnea might improve his blood pressure.  With his bradycardia, there is no room to increase the carvedilol.  Amlodipine is already at maximum dose.  If blood pressure remains elevated with more consistent BiPAP use an additional agent or an alternative agent will need to be considered.    Depression/anxiety: Working closely with his mental health providers.  Recently his Prozac and Lamictal were increased.  Will defer to his mental health providers for additional adjustments.    Reflux: Well-controlled with current pantoprazole.    Steroid-induced diabetes: Continue correction scale insulin.  Hemoglobin A1c will be checked with annual studies    Hypothyroidism: Continue Synthroid.  TSH will be checked with annual studies    \"Burning mouth syndrome\": Evaluated by his dentist.  Clonazepam and alpha lipoic acid were recommended.  I am reluctant to initiate clonazepam on a chronic basis.  Will try a trial of zinc replacement.  Check copper with the next visit.    Tremor: Patient reports persistent tremor, possibly increased.  Neurology referral locally will be initiated.    Bone health: Treated with Reclast.  Defer to endocrinology.  Bone density scan with annual studies with the next visit.    Chest pain: The patient was evaluated locally for chest pain.  He reports periodic chest pain not related to exertion.  Cardiology consultation is scheduled for November locally.    Multiple acute/subacute ischemic strokes: etiology likely embolic vs perioperative. MRI brain with infarcts noted in both cerebral hemispheres, left cerebellum, presumed associated with new Afib vs perioperative. " Bilateral upper extremity paresis (R>L), suspicion for some cortical blindness. Continue warfarin, HTN and BS control and rosuvastatin.      Ophthalmology: History of double vision and visual loss at the time of transplant.  Now with developing cataracts.  Defer to ophthalmology for ongoing follow-up.   Aspergillus/fungal infection: The patient was treated with a course of voriconazole in 2023 for Aspergillus.  Follow-up bronchoscopy grew penicillium and Fusarium but no Aspergillus.  CT was not suggestive of active fungal infection so voriconazole was discontinued.     Healthcare maintenance: The patient received his influenza vaccine in clinic today.  COVID-vaccine was unavailable.  He was encouraged to obtain COVID-vaccine locally.  Annual studies will be performed with the next visit.  The patient has a dermatology appointment scheduled locally in November.      IAbiodun, have spent 54 minutes on the day of the visit to review the chart, interview and examine the patient, review labs and imaging, formulate a plan and submit orders. Time documented is excluding time spent for PFT interpretation.    The longitudinal plan of care for the diagnosis(es)/condition(s) as documented were addressed during this visit. Due to the added complexity in care, I will continue to support Bret in the subsequent management and with ongoing continuity of care.      Abiodun Woods MD  Contact via [employer]    Note: Chart documentation done in part with Dragon Voice Recognition software. Although reviewed after completion, some word and grammatical errors may remain. Please consider this when interpreting information in this chart.     Lung TX HPI  Transplants:  10/16/2021 (Lung), Postoperative day:  1095    The patient was seen and examined by Abiodun Woods MD   No acute illnesses since the last visit.  Breathing is comfortable at rest.  Dyspnea with moderate exertion, unchanged.  Patient is active at work but no  regular exercise.  No cough.  No sputum.  Occasional chest pain described as a dull ache in the area of the sternum and just to the left, it is not associated with exertion.  Lasts minutes to an hour, no associated symptoms.  No identifiable precipitating or relieving factors.  Resolved spontaneously.  Episodic dizziness, more lightheaded than vertigo.  Happens 1-2 times per day, typically 10 to 15 minutes unpredictable timing.  Usually occurs while sitting.  Often feels hot at same time.  There are no other associated symptoms.  Typically happens at work.  Infrequent occurring on the weekends.    Review of systems:  No fever or night sweats.  Frequently feels cold.  Appetite is good  No ear pain, sore throat, sinus pain or rhinorrhea  No visual changes  No palpitations, chest pain as noted above  No nausea, vomiting, diarrhea or abdominal pain  No dysuria hematuria  No new rashes, patient is scheduled for a dermatology appointment locally November 21  Increased joint pain.  Scheduled for rheumatology evaluation November  Easy bruising  Increased difficulty with depression.  Working with a psychiatric clinic at home, recently increased Prozac and Lamictal with moderate improvement  Slight increase in tremor.  Persistent burning mouth.  A complete ROS was otherwise negative except as noted in the HPI.    Current Outpatient Medications   Medication Sig Dispense Refill     acetaminophen (TYLENOL) 325 MG tablet 3 tablets (975 mg) by Oral or Feeding Tube route every 8 hours as needed for mild pain 100 tablet 11     amLODIPine (NORVASC) 10 MG tablet Take 1 tablet (10 mg) by mouth at bedtime 30 tablet 11     Calcium Carbonate-Vitamin D (CALCIUM 600 +D HIGH POTENCY) 600-10 MG-MCG TABS 1 tablet by Oral or Feeding Tube route daily 30 tablet 11     calcium carbonate-vitamin D (CALTRATE) 600-10 MG-MCG per tablet Take 1 tablet by mouth daily.       carvedilol ER (COREG CR) 20 MG 24 hr capsule Take 2 capsules (40 mg) by mouth  daily 60 capsule 11     diclofenac (VOLTAREN) 1 % topical gel Apply 4 g topically 4 times daily Both hands 150 g 11     famotidine (PEPCID) 20 MG tablet Take 20 mg by mouth daily       ferrous sulfate (FEROSUL) 325 (65 Fe) MG tablet Take 1 tablet (325 mg) by mouth daily (with breakfast) 90 tablet 3     finasteride (PROSCAR) 5 MG tablet Take 5 mg by mouth daily       FLUoxetine (PROZAC) 20 MG capsule Take 2 capsules (40 mg) by mouth daily.       fluticasone-vilanterol (BREO ELLIPTA) 200-25 MCG/ACT inhaler Inhale 1 puff into the lungs daily. 28 each 11     hydroxychloroquine (PLAQUENIL) 200 MG tablet Take 1 tablet (200 mg) by mouth 2 times daily 180 tablet 3     insulin aspart (NOVOLOG PEN) 100 UNIT/ML pen Take 1-3 units TID if BS over 200. Max daily dose is 12 15 mL 11     insulin pen needle (32G X 4 MM) 32G X 4 MM miscellaneous Use 4 pen needles daily or as directed. 120 each 11     lamoTRIgine (LAMICTAL) 25 MG tablet Take 250 mg by mouth daily.       levalbuterol (XOPENEX HFA) 45 MCG/ACT inhaler INHALE 2 PUFFS BY MOUTH EVERY 4 HOURS AS NEEDED FOR WHEEZING FOR SHORTNESS OF BREATH 15 g 0     levothyroxine (SYNTHROID/LEVOTHROID) 25 MCG tablet Take 1 tablet (25 mcg) by mouth daily 30 tablet 11     magnesium glycinate 100 MG CAPS capsule Take 4 capsules (400 mg) by mouth 3 times daily       mirtazapine (REMERON) 7.5 MG tablet Take 7.5 mg by mouth At Bedtime       multivitamin w/minerals (THERA-VIT-M) tablet Take 1 tablet by mouth daily 30 tablet 11     mycophenolic acid (GENERIC EQUIVALENT) 180 MG EC tablet Take 1 tablet (180 mg) by mouth 2 times daily 60 tablet 11     ondansetron (ZOFRAN-ODT) 4 MG ODT tab Take 1 tablet (4 mg) by mouth every 6 hours as needed for nausea 30 tablet 3     pantoprazole (PROTONIX) 40 MG EC tablet Take 1 tablet (40 mg) by mouth 2 times daily (before meals) 60 tablet 11     predniSONE (DELTASONE) 2.5 MG tablet Take 1 tablet (2.5 mg) by mouth every evening Total dose: 5mg in AM and 2.5 mg in PM  30 tablet 11     predniSONE (DELTASONE) 5 MG tablet Take 1 tablet (5 mg) by mouth every morning AND 0.5 tablets (2.5 mg) every evening. 135 tablet 3     rOPINIRole (REQUIP) 0.25 MG tablet Take 0.25 mg by mouth At Bedtime       rosuvastatin (CRESTOR) 10 MG tablet Take 1 tablet (10 mg) by mouth daily 30 tablet 11     sildenafil (VIAGRA) 100 MG tablet 100 mg       sulfamethoxazole-trimethoprim (BACTRIM) 400-80 MG tablet Take 1 tablet by mouth daily 30 tablet 11     tacrolimus (GENERIC EQUIVALENT) 0.5 MG capsule Take 1 capsule (0.5 mg) by mouth daily. Dose change 8/7. Total dose: 1 mg in AM and 1.5 mg in PM 30 capsule 11     tacrolimus (GENERIC EQUIVALENT) 1 MG capsule Dose change 8/7. Total dose: 1 mg in AM and 1.5 mg in  capsule 3     warfarin ANTICOAGULANT (COUMADIN) 1 MG tablet Take 4 tablets (4 mg) Sun, Wed, Fri; and 5 tablets (5 mg) all other days of the week or as directed by Anticoagulation Clinic. (Patient taking differently: Take 5 tablets (5 mg) Sun, Fri; and 4 tablets (4 mg) all other days of the week or as directed by Anticoagulation Clinic.) 385 tablet 1     senna-docusate (SENOKOT-S/PERICOLACE) 8.6-50 MG tablet Take 2 tablets by mouth 2 times daily as needed for constipation (Patient not taking: Reported on 10/15/2024) 120 tablet 11     warfarin ANTICOAGULANT (COUMADIN) 1 MG tablet Take 2 to 3 tablets daily or as directed by the Coumadin clinic (Patient not taking: Reported on 10/15/2024) 90 tablet 1     No current facility-administered medications for this visit.     No Known Allergies  Past Medical History:   Diagnosis Date     BRENNAN (acute kidney injury) (H) 10/17/2021     Anxiety      Aspergillus (H) 03/08/2023     Depression      HLD (hyperlipidemia)      HTN (hypertension)      Hypothyroidism      ILD (interstitial lung disease) (H)      Infection due to 2019 novel coronavirus 03/2023     Lung infection 07/12/2023     SALTY on CPAP      Oxygen dependent     BL 4L since ~6/2021     Primary  hypertension 02/28/2022     Rheumatoid arthritis (H)     signs ~5/2020, dx 5/2021     S/P lung transplant (H) 10/16/2021     Shock liver 10/17/2021     Steroid-induced hyperglycemia      Traction bronchiectasis (H)        Past Surgical History:   Procedure Laterality Date     BRONCHOSCOPY (RIGID OR FLEXIBLE), DIAGNOSTIC N/A 1/26/2022    Procedure: BRONCHOSCOPY, WITH BRONCHOALVEOLAR LAVAGE AND BIOPSY;  Surgeon: Perlman, David Morris, MD;  [facility]     BRONCHOSCOPY (RIGID OR FLEXIBLE), DIAGNOSTIC N/A 4/19/2022    Procedure: BRONCHOSCOPY, WITH BRONCHOALVEOLAR LAVAGE AND BIOPSY;  Surgeon: Sherine Merrill MD;  [facility]     BRONCHOSCOPY (RIGID OR FLEXIBLE), DIAGNOSTIC N/A 6/21/2022    Procedure: BRONCHOSCOPY, WITH BRONCHOALVEOLAR LAVAGE AND BIOPSY;  Surgeon: Aneesh Rubio MD;  [facility]     BRONCHOSCOPY FLEXIBLE AND RIGID N/A 11/15/2023    Procedure: Surveillance Bronchoscopy with airway check;  Surgeon: Ron Daniels MD;  [facility]     COLONOSCOPY W/ BIOPSIES AND POLYPECTOMY  07/21/2020     CV CORONARY ANGIOGRAM N/A 09/08/2021    Procedure: Coronary Angiogram with possible intervention;  Surgeon: Jovon Bullock MD;  Location:  [facility]     CV RIGHT HEART CATH MEASUREMENTS RECORDED N/A 09/08/2021    Procedure: Right Heart Cath;  Surgeon: Jovon Bullock MD;  Location:  [facility]     ESOPHAGOSCOPY, GASTROSCOPY, DUODENOSCOPY (EGD), COMBINED N/A 10/23/2021    Procedure: ESOPHAGOGASTRODUODENOSCOPY (EGD);  Surgeon: Miquel Pisano MD;  [facility]     ESOPHAGOSCOPY, GASTROSCOPY, DUODENOSCOPY (EGD), COMBINED N/A 11/02/2021    Procedure: ESOPHAGOGASTRODUODENOSCOPY (EGD);  Surgeon: Daniel Ortiz MD;  [facility]     ESOPHAGOSCOPY, GASTROSCOPY, DUODENOSCOPY (EGD), COMBINED N/A 11/5/2021    Procedure: ESOPHAGOGASTRODUODENOSCOPY (EGD);  Surgeon: Ronnell Hernandez MD;  [facility]     EXTRACTION(S) DENTAL N/A 09/22/2021     Procedure: EXTRACTION tooth #19;  Surgeon: Deepak Tobin DDS;  [facility]     HERNIA REPAIR       IR CHEST TUBE PLACEMENT NON-TUNNELLED LEFT  11/03/2021     PICC TRIPLE LUMEN PLACEMENT Left 11/04/2021    5FR TL PICC. Right non occlusive thrombus subclavian vein.     REPLACE GASTROJEJUNOSTOMY TUBE, PERCUTANEOUS N/A 11/9/2021    Procedure: REPLACEMENT, GASTROJEJUNOSTOMY TUBE, PERCUTANEOUS;  Surgeon: Hernando Rodriguez MD;  [facility]     right acl       TRACHEOSTOMY PERCUTANEOUS N/A 10/29/2021    Procedure: Percutaneous Tracheostomy,;  Surgeon: Celine Jenkins MD;  [facility]     TRANSPLANT LUNG RECIPIENT SINGLE X2 Bilateral 10/16/2021    Procedure: TRANSPLANT, LUNG, RECIPIENT, BILATERAL, Bronchoscopy, on-pump perfusion, bilateral clamshell sternotomy;  Surgeon: Yanick Corral MD;  [facility]     XR ACROMIOCLAVICULAR JOINT BILATERAL         Social History     Socioeconomic History     Marital status: Single     Spouse name: Not on file     Number of children: Not on file     Years of education: Not on file     Highest education level: Not on file   Occupational History     Not on file   Tobacco Use     Smoking status: Never     Smokeless tobacco: Former     Tobacco comments:     Quit chewing over 20 years ago.   Vaping Use     Vaping status: Never Used   Substance and Sexual Activity     Alcohol use: Never     Drug use: Never     Sexual activity: Not on file   Other Topics Concern     Parent/sibling w/ CABG, MI or angioplasty before 65F 55M? Not Asked   Social History Narrative    Full time  for the [employer] starting May 24, 2023.     Social Determinants of Health     Financial Resource Strain: Low Risk  (9/20/2022)    [facility] from [facility], [facility]    Overall Financial Resource Strain (CARDIA)      Difficulty of Paying Living Expenses: Not very hard   Food Insecurity: No Food Insecurity (9/20/2022)    [facility]    Hunger Vital Sign      Worried About Running Out of Food in the Last Year: Never true      Ran Out of Food in the Last Year: Never true   Transportation Needs: No Transportation Needs (9/20/2022)    [facility]    PRAPARE - Transportation      Lack of Transportation (Medical): No      Lack of Transportation (Non-Medical): No   Physical Activity: Insufficiently Active (9/20/2022)    [facility]    Exercise Vital Sign      Days of Exercise per Week: 4 days      Minutes of Exercise per Session: 20 min   Stress: Stress Concern Present (8/25/2021)    Received from [facility]    [employer] - Occupational Stress Questionnaire      Feeling of Stress : Rather much   Social Connections: Moderately Isolated (8/25/2021)    [facility]    Social Connection and Isolation Panel [NHANES]      Frequency of Communication with Friends and Family: Twice a week      Frequency of Social Gatherings with Friends and Family: Never      Attends Nondenominational Services: Never      Active Member of Clubs or Organizations: Yes      Attends Club or Organization Meetings: Never      Marital Status: Living with partner   Interpersonal Safety: Not on file   Housing Stability: Low Risk  (8/25/2021)    [facility]    Housing Stability Vital Sign      Unable to Pay for Housing in the Last Year: No      Number of Places Lived in the Last Year: 2      Unstable Housing in the Last Year: No         /80 (BP Location: Right arm, Patient Position: Sitting, Cuff Size: Adult Regular)   Pulse 57   Temp 98.1  F (36.7  C) (Oral)   Wt 72.1 kg (158 lb 14.4 oz)   SpO2 96%    BMI 26.44 kg/m    Body mass index is 26.44 kg/m .  Exam:   GENERAL APPEARANCE: Well developed, well nourished, alert, and in no apparent distress.  EYES: PERRL, EOMI  HENT: Nasal mucosa with no edema and no hyperemia. No nasal polyps.  EARS: Canals clear, TMs normal  MOUTH: Oral mucosa is moist, without any lesions, no tonsillar enlargement, no oropharyngeal exudate.  NECK: supple, no masses, no thyromegaly.  LYMPHATICS: No significant axillary, cervical, or supraclavicular nodes.  RESP: normal percussion, good air flow throughout.  No crackles. No rhonchi. No wheezes.  CV: Normal S1, S2, regular rhythm, normal rate. No murmur.  No rub. No gallop. No LE edema.   ABDOMEN:  Bowel sounds normal, soft, nontender, no HSM or masses.   MS: extremities normal. (+) clubbing. No cyanosis.  SKIN: bruising  NEURO: Mentation intact, speech normal, normal strength and tone, normal gait and stance  PSYCH: mentation appears normal. and affect normal/bright  Results:  Recent Results (from the past 168 hour(s))   Phosphorus    Collection Time: 10/15/24  8:20 AM   Result Value Ref Range    Phosphorus 3.0 2.5 - 4.5 mg/dL   Magnesium    Collection Time: 10/15/24  8:20 AM   Result Value Ref Range    Magnesium 1.6 (L) 1.7 - 2.3 mg/dL   Comprehensive metabolic panel    Collection Time: 10/15/24  8:20 AM   Result Value Ref Range    Sodium 145 135 - 145 mmol/L    Potassium 4.3 3.4 - 5.3 mmol/L    Carbon Dioxide (CO2) 28 22 - 29 mmol/L    Anion Gap 11 7 - 15 mmol/L    Urea Nitrogen 15.0 8.0 - 23.0 mg/dL    Creatinine 1.20 (H) 0.67 - 1.17 mg/dL    GFR Estimate 70 >60 mL/min/1.73m2    Calcium 9.2 8.8 - 10.4 mg/dL    Chloride 106 98 - 107 mmol/L    Glucose 111 (H) 70 - 99 mg/dL    Alkaline Phosphatase 65 40 - 150 U/L    AST 34 0 - 45 U/L    ALT 56 0 - 70 U/L    Protein Total 6.6 6.4 - 8.3 g/dL    Albumin 4.5 3.5 - 5.2 g/dL    Bilirubin Total 0.4 <=1.2 mg/dL   INR    Collection Time: 10/15/24  8:20 AM   Result Value Ref Range    INR 2.76 (H)  0.85 - 1.15   CBC with platelets and differential    Collection Time: 10/15/24  8:20 AM   Result Value Ref Range    WBC Count 8.1 4.0 - 11.0 10e3/uL    RBC Count 4.92 4.40 - 5.90 10e6/uL    Hemoglobin 14.9 13.3 - 17.7 g/dL    Hematocrit 44.6 40.0 - 53.0 %    MCV 91 78 - 100 fL    MCH 30.3 26.5 - 33.0 pg    MCHC 33.4 31.5 - 36.5 g/dL    RDW 13.6 10.0 - 15.0 %    Platelet Count 173 150 - 450 10e3/uL   Manual Differential    Collection Time: 10/15/24  8:20 AM   Result Value Ref Range    % Neutrophils 72 %    % Lymphocytes 18 %    % Monocytes 10 %    % Eosinophils 0 %    % Basophils 0 %    Absolute Neutrophils 5.8 1.6 - 8.3 10e3/uL    Absolute Lymphocytes 1.5 0.8 - 5.3 10e3/uL    Absolute Monocytes 0.8 0.0 - 1.3 10e3/uL    Absolute Eosinophils 0.0 0.0 - 0.7 10e3/uL    Absolute Basophils 0.0 0.0 - 0.2 10e3/uL    RBC Morphology Confirmed RBC Indices     Platelet Assessment  Automated Count Confirmed. Platelet morphology is normal.     Automated Count Confirmed. Platelet morphology is normal.   General PFT Lab (Please always keep checked)    Collection Time: 10/15/24  8:27 AM   Result Value Ref Range    FVC-Pred 3.46 L    FVC-Pre 2.39 L    FVC-%Pred-Pre 69 %    FEV1-Pre 1.44 L    FEV1-%Pred-Pre 52 %    FEV1FVC-Pred 80 %    FEV1FVC-Pre 60 %    FEFMax-Pred 7.97 L/sec    FEFMax-Pre 4.59 L/sec    FEFMax-%Pred-Pre 57 %    FEF2575-Pred 2.47 L/sec    FEF2575-Pre 0.69 L/sec    JNO7024-%Pred-Pre 27 %    ExpTime-Pre 7.16 sec    FIFMax-Pre 4.47 L/sec    FEV1FEV6-Pred 79 %    FEV1FEV6-Pre 61 %                         Results as noted above.    PFT Interpretation:  Moderate obstructive ventilatory defect.  Increased from previous.  Below recent best but similar to recent baseline.   Valid Maneuver                  Again, thank you for allowing me to participate in the care of your patient.        Sincerely,        Abiodun Woods MD

## 2024-10-15 NOTE — PROGRESS NOTES
Reason for Visit  Edson Thornton Jr. is a 59 year old year old male who is being seen for RECHECK (LUNG POST RETURN TXP PULM - 3 (4) month follow up./)      Assessment and plan:   Edson Thornton is a 58 year old male with NSIP/ILD, bronchiectasis, moderate PH, RA, SALTY, chronic HSV infection, hypogammaglobulinemia, steroid-induced diabetes, hypothyroidism, PFO, HTN, HLD, duodenal anomaly, anxiety, and depression. Admitted on 9/5/21 from OSH for acute on chronic respiratory failure 2/2 ILD exacerbation now s/p BSLT on 10/16/21. Prolonged vent wean s/p trach and PEG/J tube.  Post-op course also complicated by encephalopathy and diffuse weakness, acute to subacute CVA, afib with RVR, BRENNAN, GI bleed, Candidemia/Candida empyema, and anxiety. Code called 11/2 for bradycardia/asystole, progressive hypotension, found to have significant GI bleed, EGD with adherent clot near PEG tube site that was clipped.  The patient had a positive crossmatch following transplant which was attributed to rituximab patient had received in June 2021 but subsequently has had intermittent positive DSA. His exercise tolerance and PFTs have not reached the level expected.      Pulmonary/lung transplant: Patient reports dyspnea with moderate exertion, similar to baseline.  Oxygenation is adequate at 96%.  Chest x-ray, reviewed by me with patchy opacities at the bases, unchanged from previous.  PFTs with moderate obstructive ventilatory defect, improved from June, slightly below previous best but in a range similar to the patient's baseline.  Continue current immunosuppression.  Change from CellCept to Myfortic due to diarrhea, reduced dose due to previous recurrent respiratory infections.  Tacrolimus will be adjusted to maintain a level of 8-10.  Cell free DNA at the last visit was low at 0.24.      Positive crossmatch/DSA: The patient had a retrospective positive crossmatch following transplantation, attributed to rituximab received in June 2022.   The patient did have a positive DQB5 November 2021 through March 2023.  It had resolved for many months but reappeared in June 2024.  At that time his cell free DNA was quite low.  Repeat DSA and cell free DNA from today's visit is pending.  If DSA remains elevated, will consider increase in Myfortic dose.  Date DSA mfi Biopsy (date) Treatment   10/17/2021  none         10/23/2021  none         11/1/2021  none         11/15/2021  none         11/29/2021  DQ B5  2522       12/6/2021  DQ B5  1873       12/12/2021  DQ B5  1703       12/27/2021  DQ B5  3924       1/3/2022  DQ B5  3072       1/10/2022  DQ B5  4032       1/17/2022  DQB5  1849       2/3/2022 DQB5   2488       2/7/2022 DQB5  1874       2/10/2022 DQB5  1781       3/15/2022 DQB5  2254       3/21/2022 DQB5  2117       4/18/2022 DQB5   908       6/20/2022  DQB5  1100       6/29/2022  DQB5  1411       9/12/2022 DQB5  1681       11/14/2022 DQB5  891       12/20/2022  DQB5  1553       3/8/2023  DQB5  551       4/25/2023 DQB5 None       8/1/2023 DQB5 None       11/14/23 DQB5 None       2/13/2024 None         6/5/2024 DQ B5 1921              CKD: History of stage II CKD.  Creatinine is at the high end of the patient's recent range.  He requires a therapeutic dose of tacrolimus but should avoid other nephrotoxins and maintain adequate hydration.    Prophylaxis: Continue Bactrim for PJP prophylaxis.  CMV and EBV are negative from today's visit.  Continue monitoring.    Hypogammaglobulinemia: IgG was low at 298 in November 2023.  No recent replacement.  Recheck with annual studies with the next visit.  If it remains low, will need to consider reinitiating replacement.    Rheumatoid arthritis: Recent increase in joint pain.  The patient is scheduled for follow-up with rheumatology in November.    Atrial fibrillation with RVR: The patient appears to be in sinus rhythm on exam today.  Continue carvedilol and warfarin.    Obstructive sleep apnea: Patient reports using his  "BiPAP inconsistently.  He was encouraged to be more consistent especially with ongoing hypertension (see below).    Hypertension: Blood pressure is elevated in clinic today.  He reports that generally it runs 130-150/50s/80s.  Still above goal.  Pulse is generally in the 50s.  Blood pressure appears to be only moderately well-controlled with current amlodipine and carvedilol.  The patient was encouraged to be more consistent with his BiPAP to see if treating his sleep apnea might improve his blood pressure.  With his bradycardia, there is no room to increase the carvedilol.  Amlodipine is already at maximum dose.  If blood pressure remains elevated with more consistent BiPAP use an additional agent or an alternative agent will need to be considered.    Depression/anxiety: Working closely with his mental health providers.  Recently his Prozac and Lamictal were increased.  Will defer to his mental health providers for additional adjustments.    Reflux: Well-controlled with current pantoprazole.    Steroid-induced diabetes: Continue correction scale insulin.  Hemoglobin A1c will be checked with annual studies    Hypothyroidism: Continue Synthroid.  TSH will be checked with annual studies    \"Burning mouth syndrome\": Evaluated by his dentist.  Clonazepam and alpha lipoic acid were recommended.  I am reluctant to initiate clonazepam on a chronic basis.  Will try a trial of zinc replacement.  Check copper with the next visit.    Tremor: Patient reports persistent tremor, possibly increased.  Neurology referral locally will be initiated.    Bone health: Treated with Reclast.  Defer to endocrinology.  Bone density scan with annual studies with the next visit.    Chest pain: The patient was evaluated locally for chest pain.  He reports periodic chest pain not related to exertion.  Cardiology consultation is scheduled for November locally.    Multiple acute/subacute ischemic strokes: etiology likely embolic vs perioperative. " MRI brain with infarcts noted in both cerebral hemispheres, left cerebellum, presumed associated with new Afib vs perioperative. Bilateral upper extremity paresis (R>L), suspicion for some cortical blindness. Continue warfarin, HTN and BS control and rosuvastatin.      Ophthalmology: History of double vision and visual loss at the time of transplant.  Now with developing cataracts.  Defer to ophthalmology for ongoing follow-up.   Aspergillus/fungal infection: The patient was treated with a course of voriconazole in 2023 for Aspergillus.  Follow-up bronchoscopy grew penicillium and Fusarium but no Aspergillus.  CT was not suggestive of active fungal infection so voriconazole was discontinued.     Healthcare maintenance: The patient received his influenza vaccine in clinic today.  COVID-vaccine was unavailable.  He was encouraged to obtain COVID-vaccine locally.  Annual studies will be performed with the next visit.  The patient has a dermatology appointment scheduled locally in November.      IAbiodun, have spent 54 minutes on the day of the visit to review the chart, interview and examine the patient, review labs and imaging, formulate a plan and submit orders. Time documented is excluding time spent for PFT interpretation.    The longitudinal plan of care for the diagnosis(es)/condition(s) as documented were addressed during this visit. Due to the added complexity in care, I will continue to support Bret in the subsequent management and with ongoing continuity of care.      Abiodun Woods MD  Contact via [employer]    Note: Chart documentation done in part with Dragon Voice Recognition software. Although reviewed after completion, some word and grammatical errors may remain. Please consider this when interpreting information in this chart.     Lung TX HPI  Transplants:  10/16/2021 (Lung), Postoperative day:  1095    The patient was seen and examined by Abiodun Woods MD   No acute illnesses since the  last visit.  Breathing is comfortable at rest.  Dyspnea with moderate exertion, unchanged.  Patient is active at work but no regular exercise.  No cough.  No sputum.  Occasional chest pain described as a dull ache in the area of the sternum and just to the left, it is not associated with exertion.  Lasts minutes to an hour, no associated symptoms.  No identifiable precipitating or relieving factors.  Resolved spontaneously.  Episodic dizziness, more lightheaded than vertigo.  Happens 1-2 times per day, typically 10 to 15 minutes unpredictable timing.  Usually occurs while sitting.  Often feels hot at same time.  There are no other associated symptoms.  Typically happens at work.  Infrequent occurring on the weekends.    Review of systems:  No fever or night sweats.  Frequently feels cold.  Appetite is good  No ear pain, sore throat, sinus pain or rhinorrhea  No visual changes  No palpitations, chest pain as noted above  No nausea, vomiting, diarrhea or abdominal pain  No dysuria hematuria  No new rashes, patient is scheduled for a dermatology appointment locally November 21  Increased joint pain.  Scheduled for rheumatology evaluation November  Easy bruising  Increased difficulty with depression.  Working with a psychiatric clinic at home, recently increased Prozac and Lamictal with moderate improvement  Slight increase in tremor.  Persistent burning mouth.  A complete ROS was otherwise negative except as noted in the HPI.    Current Outpatient Medications   Medication Sig Dispense Refill    acetaminophen (TYLENOL) 325 MG tablet 3 tablets (975 mg) by Oral or Feeding Tube route every 8 hours as needed for mild pain 100 tablet 11    amLODIPine (NORVASC) 10 MG tablet Take 1 tablet (10 mg) by mouth at bedtime 30 tablet 11    Calcium Carbonate-Vitamin D (CALCIUM 600 +D HIGH POTENCY) 600-10 MG-MCG TABS 1 tablet by Oral or Feeding Tube route daily 30 tablet 11    calcium carbonate-vitamin D (CALTRATE) 600-10 MG-MCG per  tablet Take 1 tablet by mouth daily.      carvedilol ER (COREG CR) 20 MG 24 hr capsule Take 2 capsules (40 mg) by mouth daily 60 capsule 11    diclofenac (VOLTAREN) 1 % topical gel Apply 4 g topically 4 times daily Both hands 150 g 11    famotidine (PEPCID) 20 MG tablet Take 20 mg by mouth daily      ferrous sulfate (FEROSUL) 325 (65 Fe) MG tablet Take 1 tablet (325 mg) by mouth daily (with breakfast) 90 tablet 3    finasteride (PROSCAR) 5 MG tablet Take 5 mg by mouth daily      FLUoxetine (PROZAC) 20 MG capsule Take 2 capsules (40 mg) by mouth daily.      fluticasone-vilanterol (BREO ELLIPTA) 200-25 MCG/ACT inhaler Inhale 1 puff into the lungs daily. 28 each 11    hydroxychloroquine (PLAQUENIL) 200 MG tablet Take 1 tablet (200 mg) by mouth 2 times daily 180 tablet 3    insulin aspart (NOVOLOG PEN) 100 UNIT/ML pen Take 1-3 units TID if BS over 200. Max daily dose is 12 15 mL 11    insulin pen needle (32G X 4 MM) 32G X 4 MM miscellaneous Use 4 pen needles daily or as directed. 120 each 11    lamoTRIgine (LAMICTAL) 25 MG tablet Take 250 mg by mouth daily.      levalbuterol (XOPENEX HFA) 45 MCG/ACT inhaler INHALE 2 PUFFS BY MOUTH EVERY 4 HOURS AS NEEDED FOR WHEEZING FOR SHORTNESS OF BREATH 15 g 0    levothyroxine (SYNTHROID/LEVOTHROID) 25 MCG tablet Take 1 tablet (25 mcg) by mouth daily 30 tablet 11    magnesium glycinate 100 MG CAPS capsule Take 4 capsules (400 mg) by mouth 3 times daily      mirtazapine (REMERON) 7.5 MG tablet Take 7.5 mg by mouth At Bedtime      multivitamin w/minerals (THERA-VIT-M) tablet Take 1 tablet by mouth daily 30 tablet 11    mycophenolic acid (GENERIC EQUIVALENT) 180 MG EC tablet Take 1 tablet (180 mg) by mouth 2 times daily 60 tablet 11    ondansetron (ZOFRAN-ODT) 4 MG ODT tab Take 1 tablet (4 mg) by mouth every 6 hours as needed for nausea 30 tablet 3    pantoprazole (PROTONIX) 40 MG EC tablet Take 1 tablet (40 mg) by mouth 2 times daily (before meals) 60 tablet 11    predniSONE  (DELTASONE) 2.5 MG tablet Take 1 tablet (2.5 mg) by mouth every evening Total dose: 5mg in AM and 2.5 mg in PM 30 tablet 11    predniSONE (DELTASONE) 5 MG tablet Take 1 tablet (5 mg) by mouth every morning AND 0.5 tablets (2.5 mg) every evening. 135 tablet 3    rOPINIRole (REQUIP) 0.25 MG tablet Take 0.25 mg by mouth At Bedtime      rosuvastatin (CRESTOR) 10 MG tablet Take 1 tablet (10 mg) by mouth daily 30 tablet 11    sildenafil (VIAGRA) 100 MG tablet 100 mg      sulfamethoxazole-trimethoprim (BACTRIM) 400-80 MG tablet Take 1 tablet by mouth daily 30 tablet 11    tacrolimus (GENERIC EQUIVALENT) 0.5 MG capsule Take 1 capsule (0.5 mg) by mouth daily. Dose change 8/7. Total dose: 1 mg in AM and 1.5 mg in PM 30 capsule 11    tacrolimus (GENERIC EQUIVALENT) 1 MG capsule Dose change 8/7. Total dose: 1 mg in AM and 1.5 mg in  capsule 3    warfarin ANTICOAGULANT (COUMADIN) 1 MG tablet Take 4 tablets (4 mg) Sun, Wed, Fri; and 5 tablets (5 mg) all other days of the week or as directed by Anticoagulation Clinic. (Patient taking differently: Take 5 tablets (5 mg) Sun, Fri; and 4 tablets (4 mg) all other days of the week or as directed by Anticoagulation Clinic.) 385 tablet 1    senna-docusate (SENOKOT-S/PERICOLACE) 8.6-50 MG tablet Take 2 tablets by mouth 2 times daily as needed for constipation (Patient not taking: Reported on 10/15/2024) 120 tablet 11    warfarin ANTICOAGULANT (COUMADIN) 1 MG tablet Take 2 to 3 tablets daily or as directed by the Coumadin clinic (Patient not taking: Reported on 10/15/2024) 90 tablet 1     No current facility-administered medications for this visit.     No Known Allergies  Past Medical History:   Diagnosis Date    BRNENAN (acute kidney injury) (H) 10/17/2021    Anxiety     Aspergillus (H) 03/08/2023    Depression     HLD (hyperlipidemia)     HTN (hypertension)     Hypothyroidism     ILD (interstitial lung disease) (H)     Infection due to 2019 novel coronavirus 03/2023    Lung infection  07/12/2023    SALTY on CPAP     Oxygen dependent     BL 4L since ~6/2021    Primary hypertension 02/28/2022    Rheumatoid arthritis (H)     signs ~5/2020, dx 5/2021    S/P lung transplant (H) 10/16/2021    Shock liver 10/17/2021    Steroid-induced hyperglycemia     Traction bronchiectasis (H)        Past Surgical History:   Procedure Laterality Date    BRONCHOSCOPY (RIGID OR FLEXIBLE), DIAGNOSTIC N/A 1/26/2022    Procedure: BRONCHOSCOPY, WITH BRONCHOALVEOLAR LAVAGE AND BIOPSY;  Surgeon: Perlman, David Morris, MD;  [facility]    BRONCHOSCOPY (RIGID OR FLEXIBLE), DIAGNOSTIC N/A 4/19/2022    Procedure: BRONCHOSCOPY, WITH BRONCHOALVEOLAR LAVAGE AND BIOPSY;  Surgeon: Sherine Merrill MD;  [facility]    BRONCHOSCOPY (RIGID OR FLEXIBLE), DIAGNOSTIC N/A 6/21/2022    Procedure: BRONCHOSCOPY, WITH BRONCHOALVEOLAR LAVAGE AND BIOPSY;  Surgeon: Aneesh Rubio MD;  [facility]    BRONCHOSCOPY FLEXIBLE AND RIGID N/A 11/15/2023    Procedure: Surveillance Bronchoscopy with airway check;  Surgeon: Ron Daniels MD;  Location:  GI    COLONOSCOPY W/ BIOPSIES AND POLYPECTOMY  07/21/2020    CV CORONARY ANGIOGRAM N/A 09/08/2021    Procedure: Coronary Angiogram with possible intervention;  Surgeon: Jovon Bullock MD;  Location:  [facility]    CV RIGHT HEART CATH MEASUREMENTS RECORDED N/A 09/08/2021    Procedure: Right Heart Cath;  Surgeon: Jovon Bullock MD;  Location:  [facility]    ESOPHAGOSCOPY, GASTROSCOPY, DUODENOSCOPY (EGD), COMBINED N/A 10/23/2021    Procedure: ESOPHAGOGASTRODUODENOSCOPY (EGD);  Surgeon: Miquel Pisano MD;  [facility]    ESOPHAGOSCOPY, GASTROSCOPY, DUODENOSCOPY (EGD), COMBINED N/A 11/02/2021    Procedure: ESOPHAGOGASTRODUODENOSCOPY (EGD);  Surgeon: Daniel Ortiz MD;  Location:  GI    ESOPHAGOSCOPY, GASTROSCOPY, DUODENOSCOPY (EGD), COMBINED N/A 11/5/2021    Procedure: ESOPHAGOGASTRODUODENOSCOPY (EGD);  Surgeon: Ronnell Hernandez  MD Les;  [facility]    EXTRACTION(S) DENTAL N/A 09/22/2021    Procedure: EXTRACTION tooth #19;  Surgeon: Deepak Tobin DDS;  [facility]    HERNIA REPAIR      IR CHEST TUBE PLACEMENT NON-TUNNELLED LEFT  11/03/2021    PICC TRIPLE LUMEN PLACEMENT Left 11/04/2021    5FR TL PICC. Right non occlusive thrombus subclavian vein.    REPLACE GASTROJEJUNOSTOMY TUBE, PERCUTANEOUS N/A 11/9/2021    Procedure: REPLACEMENT, GASTROJEJUNOSTOMY TUBE, PERCUTANEOUS;  Surgeon: Hernando Rodriguez MD;  [facility]    right acl      TRACHEOSTOMY PERCUTANEOUS N/A 10/29/2021    Procedure: Percutaneous Tracheostomy,;  Surgeon: Celine Jenkins MD;  [facility]    TRANSPLANT LUNG RECIPIENT SINGLE X2 Bilateral 10/16/2021    Procedure: TRANSPLANT, LUNG, RECIPIENT, BILATERAL, Bronchoscopy, on-pump perfusion, bilateral clamshell sternotomy;  Surgeon: Yanick Corral MD;  [facility]    XR ACROMIOCLAVICULAR JOINT BILATERAL         Social History     Socioeconomic History    Marital status: Single     Spouse name: Not on file    Number of children: Not on file    Years of education: Not on file    Highest education level: Not on file   Occupational History    Not on file   Tobacco Use    Smoking status: Never    Smokeless tobacco: Former    Tobacco comments:     Quit chewing over 20 years ago.   Vaping Use    Vaping status: Never Used   Substance and Sexual Activity    Alcohol use: Never    Drug use: Never    Sexual activity: Not on file   Other Topics Concern    Parent/sibling w/ CABG, MI or angioplasty before 65F 55M? Not Asked   Social History Narrative    Full time  for the [employer] starting May 24, 2023.     Social Determinants of Health     Financial Resource Strain: Low Risk  (9/20/2022)    [facility], [facility]    Overall Financial Resource Strain (CARDIA)     Difficulty of Paying Living Expenses: Not very hard   Food Insecurity:  No Food Insecurity (9/20/2022)    Received from [facility], [facility]    Hunger Vital Sign     Worried About Running Out of Food in the Last Year: Never true     Ran Out of Food in the Last Year: Never true   Transportation Needs: No Transportation Needs (9/20/2022)    Received from [facility], [facility]    PRAPARE - Transportation     Lack of Transportation (Medical): No     Lack of Transportation (Non-Medical): No   Physical Activity: Insufficiently Active (9/20/2022)    Received from [facility], [facility]    Exercise Vital Sign     Days of Exercise per Week: 4 days     Minutes of Exercise per Session: 20 min   Stress: Stress Concern Present (8/25/2021)    Received from [facility], [facility]    [employer] - Occupational Stress Questionnaire     Feeling of Stress : Rather much   Social Connections: Moderately Isolated (8/25/2021)    Received from [facility], [facility]    Social Connection and Isolation Panel [NHANES]     Frequency of Communication with Friends and Family: Twice a week     Frequency of Social Gatherings with Friends and Family: Never     Attends [employer] Services: Never     Active Member of Clubs or Organizations: Yes     Attends Club or Organization Meetings: Never     Marital Status: Living with partner   Interpersonal Safety: Not on file   Housing Stability: Low Risk  (8/25/2021)    Received from [facility], [facility]    Housing Stability Vital Sign     Unable to Pay for Housing in the Last Year: No     Number of Places Lived in the Last Year: 2     Unstable Housing in the Last Year: No         /80 (BP Location: Right arm, Patient Position: Sitting, Cuff Size: Adult Regular)   Pulse 57   Temp 98.1  F (36.7  C) (Oral)   Wt 72.1  kg (158 lb 14.4 oz)   SpO2 96%   BMI 26.44 kg/m    Body mass index is 26.44 kg/m .  Exam:   GENERAL APPEARANCE: Well developed, well nourished, alert, and in no apparent distress.  EYES: PERRL, EOMI  HENT: Nasal mucosa with no edema and no hyperemia. No nasal polyps.  EARS: Canals clear, TMs normal  MOUTH: Oral mucosa is moist, without any lesions, no tonsillar enlargement, no oropharyngeal exudate.  NECK: supple, no masses, no thyromegaly.  LYMPHATICS: No significant axillary, cervical, or supraclavicular nodes.  RESP: normal percussion, good air flow throughout.  No crackles. No rhonchi. No wheezes.  CV: Normal S1, S2, regular rhythm, normal rate. No murmur.  No rub. No gallop. No LE edema.   ABDOMEN:  Bowel sounds normal, soft, nontender, no HSM or masses.   MS: extremities normal. (+) clubbing. No cyanosis.  SKIN: bruising  NEURO: Mentation intact, speech normal, normal strength and tone, normal gait and stance  PSYCH: mentation appears normal. and affect normal/bright  Results:  Recent Results (from the past 168 hour(s))   Phosphorus    Collection Time: 10/15/24  8:20 AM   Result Value Ref Range    Phosphorus 3.0 2.5 - 4.5 mg/dL   Magnesium    Collection Time: 10/15/24  8:20 AM   Result Value Ref Range    Magnesium 1.6 (L) 1.7 - 2.3 mg/dL   Comprehensive metabolic panel    Collection Time: 10/15/24  8:20 AM   Result Value Ref Range    Sodium 145 135 - 145 mmol/L    Potassium 4.3 3.4 - 5.3 mmol/L    Carbon Dioxide (CO2) 28 22 - 29 mmol/L    Anion Gap 11 7 - 15 mmol/L    Urea Nitrogen 15.0 8.0 - 23.0 mg/dL    Creatinine 1.20 (H) 0.67 - 1.17 mg/dL    GFR Estimate 70 >60 mL/min/1.73m2    Calcium 9.2 8.8 - 10.4 mg/dL    Chloride 106 98 - 107 mmol/L    Glucose 111 (H) 70 - 99 mg/dL    Alkaline Phosphatase 65 40 - 150 U/L    AST 34 0 - 45 U/L    ALT 56 0 - 70 U/L    Protein Total 6.6 6.4 - 8.3 g/dL    Albumin 4.5 3.5 - 5.2 g/dL    Bilirubin Total 0.4 <=1.2 mg/dL   INR    Collection Time: 10/15/24  8:20 AM   Result  Value Ref Range    INR 2.76 (H) 0.85 - 1.15   CBC with platelets and differential    Collection Time: 10/15/24  8:20 AM   Result Value Ref Range    WBC Count 8.1 4.0 - 11.0 10e3/uL    RBC Count 4.92 4.40 - 5.90 10e6/uL    Hemoglobin 14.9 13.3 - 17.7 g/dL    Hematocrit 44.6 40.0 - 53.0 %    MCV 91 78 - 100 fL    MCH 30.3 26.5 - 33.0 pg    MCHC 33.4 31.5 - 36.5 g/dL    RDW 13.6 10.0 - 15.0 %    Platelet Count 173 150 - 450 10e3/uL   Manual Differential    Collection Time: 10/15/24  8:20 AM   Result Value Ref Range    % Neutrophils 72 %    % Lymphocytes 18 %    % Monocytes 10 %    % Eosinophils 0 %    % Basophils 0 %    Absolute Neutrophils 5.8 1.6 - 8.3 10e3/uL    Absolute Lymphocytes 1.5 0.8 - 5.3 10e3/uL    Absolute Monocytes 0.8 0.0 - 1.3 10e3/uL    Absolute Eosinophils 0.0 0.0 - 0.7 10e3/uL    Absolute Basophils 0.0 0.0 - 0.2 10e3/uL    RBC Morphology Confirmed RBC Indices     Platelet Assessment  Automated Count Confirmed. Platelet morphology is normal.     Automated Count Confirmed. Platelet morphology is normal.   General PFT Lab (Please always keep checked)    Collection Time: 10/15/24  8:27 AM   Result Value Ref Range    FVC-Pred 3.46 L    FVC-Pre 2.39 L    FVC-%Pred-Pre 69 %    FEV1-Pre 1.44 L    FEV1-%Pred-Pre 52 %    FEV1FVC-Pred 80 %    FEV1FVC-Pre 60 %    FEFMax-Pred 7.97 L/sec    FEFMax-Pre 4.59 L/sec    FEFMax-%Pred-Pre 57 %    FEF2575-Pred 2.47 L/sec    FEF2575-Pre 0.69 L/sec    SCE7830-%Pred-Pre 27 %    ExpTime-Pre 7.16 sec    FIFMax-Pre 4.47 L/sec    FEV1FEV6-Pred 79 %    FEV1FEV6-Pre 61 %                         Results as noted above.    PFT Interpretation:  Moderate obstructive ventilatory defect.  Increased from previous.  Below recent best but similar to recent baseline.   Valid Maneuver

## 2024-10-15 NOTE — NURSING NOTE
Chief Complaint   Patient presents with    RECHECK     LUNG POST RETURN TXP PULM - 3 (4) month follow up.       Vitals:    10/15/24 0850 10/15/24 0900   BP: (!) 171/86 125/80   BP Location: Right arm Right arm   Patient Position: Sitting Sitting   Cuff Size: Adult Regular Adult Regular   Pulse: 57    Temp: 98.1  F (36.7  C)    TempSrc: Oral    SpO2: 96%    Weight: 72.1 kg (158 lb 14.4 oz)        BP Readings from Last 3 Encounters:   10/15/24 125/80   06/18/24 (!) 162/87   06/17/24 (!) 154/82       /80 (BP Location: Right arm, Patient Position: Sitting, Cuff Size: Adult Regular)   Pulse 57   Temp 98.1  F (36.7  C) (Oral)   Wt 72.1 kg (158 lb 14.4 oz)   SpO2 96%   BMI 26.44 kg/m       Tyree Richardson, [facility]

## 2024-10-15 NOTE — PROGRESS NOTES
ANTICOAGULATION MANAGEMENT     Edson Thornton Jr. 59 year old male is on warfarin with therapeutic INR result. (Goal INR 2.0-3.0)    Recent labs: (last 7 days)     10/15/24  0820   INR 2.76*       ASSESSMENT     Source(s): Chart Review and Patient/Caregiver Call     Warfarin doses taken: Warfarin taken as instructed  Diet: No new diet changes identified  Medication/supplement changes: None noted  New illness, injury, or hospitalization: Had a fall on 9/28- tripped over a parking block. Had abrasion to the face and head. CT clear.   Signs or symptoms of bleeding or clotting: No  Previous result: Therapeutic last 2(+) visits  Additional findings: None       PLAN     Recommended plan for no diet, medication or health factor changes affecting INR     Dosing Instructions: Continue your current warfarin dose with next INR in 3 weeks       Summary  As of 10/15/2024      Full warfarin instructions:  5 mg every Sun, Thu; 4 mg all other days   Next INR check:  11/5/2024               Telephone call with Bret who verbalizes understanding and agrees to plan and who agrees to plan and repeated back plan correctly    Patient using outside facility for labs    Education provided: Please call back if any changes to your diet, medications or how you've been taking warfarin    Plan made per [facility] anticoagulation protocol    Freda Bhakta RN  10/15/2024  Anticoagulation Clinic  [employer] for routing messages: andrew [facility] patient phone line: [phone]        _______________________________________________________________________     Anticoagulation Episode Summary       Current INR goal:  2.0-3.0   TTR:  59.2% (1 y)   Target end date:  Indefinite   Send INR reminders to:  [facility]    Indications    Atrial fibrillation  unspecified type (H) [I48.91]  Encounter for long-term (current) use of high-risk medication [Z79.899]  Immunosuppressed status (H) [D84.9]  S/P lung transplant (H) [Z94.2]  Ischemic stroke  (H) [I63.9]             Comments:               Anticoagulation Care Providers       Provider Role Specialty Phone number    Abiodun Woods MD Referring Pulmonary Disease [phone]

## 2024-10-15 NOTE — PROGRESS NOTES
"Transplant Coordinator Note    Reason for visit: Post lung transplant follow up visit   Coordinator: Present (Gisel in person)  Caregiver:  Not present    Health concerns addressed today:  1. Respiratory: short of breath with activity (not new); no cough  2. ENT: no concerns  3. GI/: appetite good; no other concerns  4. Chest pain every once in a while (dull ache mid sternum) - follow up with cardiology next month; lasts few minutes to 1 hour  5. Dizziness and lightheaded at times - 1-2x per day - lasts 2-15 minutes each time - happens when sitting at desk working; usually happens when working  6. Chills intermittently (not new)  7. Working with psychiatry to adjust medications to help depression  8. Tremor worse since switching from propranolol to carvedilol  9. Saw dentist - has burning mouth syndrome - Dr. Woods looking into meds that were recommended (clonazepam and alphalapoic (sp?) acid)     Activity/rehab: up [facility]  Oxygen needs: room air, BiPAP NOC (uses \"off and on\")  Pain management/RX: joint/bone pain (wrist and fingers), sees rheumatology in November. Some swelling. Using Volteran Gel.   Diabetic management: on lantus, occasional novolog  CMV status: D-/R-  EBV status: D+/R+  AC/asa: on coumadin, bridge to Lovenox for bronch (lower dose per [facility] d/t bleeding post bronch x 2)  PJP prophylactic: Bactrim     COVID:  COVID-19 infection (yes/no, date of most recent positive test): most recent positive test: 11/1 - treated with IV Remdesivir   Status/instructions given about COVID-19 vaccine:      Pt Education: medications (use/dose/side effects), how/when to call coordinator, frequency of labs, s/s of infection/rejection, call prior to starting any new medications, lab/vital sign book     Health Maintenance:   Last colonoscopy: 7/2020  Next colonoscopy due: 3-5 years, 7/2023-7/2025  Dermatology: sees dermatology annual locally - 11/2025  Vaccinations this visit:   Dexa: due?    Labs, CXR, PFTs reviewed " with patient  Medication record reviewed and reconciled  Questions and concerns addressed    Patient Instructions  1. Continue to hydrate with 60-70 oz fluids daily.  2. Continue to exercise daily or most days of the week.  Try to get some focused exercise.  3. Follow up with your primary care provider for annual gender health maintenance procedures.  4. Follow up with colonoscopy schedule.  5. Follow up with annual dermatology visits.  6. It doesn't seem like the COVID vaccine is working well in lung transplant patients. A number of lung transplant patients have gotten sick with COVID even after receiving the vaccines. Based on our recent experience, it can be life-threatening to get COVID  even after being vaccinated. Please continue to act like you did not get the COVID vaccine - social distancing, wearing a mask, good hand hygiene, etc. If the people around you are vaccinated, it will help reduce the risk of you getting COVID. All members of your household should be vaccinated.  7. If dizziness changes or you start to notice chest pain or racing heartbeat, let Gisel/Alfred know.  8. Use your Bipap regularly.  Continue to monitor your blood pressure.  If your blood pressure doesn't improve, let Gisel/Alfred know how it's running.  We'll likely need to adjust your blood pressure medications if it doesn't improve.  9. Follow up with neurology for your tremor.    Next transplant clinic appointment:  4 months with CXR, labs and PFTs  Next lab draw: pending tacrolimus dose, otherwise monthly    AVS printed at time of check out

## 2024-10-15 NOTE — PROGRESS NOTES
Edson Thornton Jr. comes into clinic today at the request of Dr Woods , for spirometry     Tolerated testing well. No adverse reactions. Left lab in no distress.        ALEK HAYES

## 2024-10-15 NOTE — PATIENT INSTRUCTIONS
Patient Instructions  1. Continue to hydrate with 60-70 oz fluids daily.  2. Continue to exercise daily or most days of the week.  Try to get some focused exercise.  3. Follow up with your primary care provider for annual gender health maintenance procedures.  4. Follow up with colonoscopy schedule.  5. Follow up with annual dermatology visits.  6. It doesn't seem like the COVID vaccine is working well in lung transplant patients. A number of lung transplant patients have gotten sick with COVID even after receiving the vaccines. Based on our recent experience, it can be life-threatening to get COVID  even after being vaccinated. Please continue to act like you did not get the COVID vaccine - social distancing, wearing a mask, good hand hygiene, etc. If the people around you are vaccinated, it will help reduce the risk of you getting COVID. All members of your household should be vaccinated.  7. If dizziness changes or you start to notice chest pain or racing heartbeat, let Gisel/Alfred know.  8. Use your Bipap regularly.  Continue to monitor your blood pressure.  If your blood pressure doesn't improve, let Gisel/Alfred know how it's running.  We'll likely need to adjust your blood pressure medications if it doesn't improve.  9. Follow up with neurology for your tremor.    Next transplant clinic appointment:  4 months with CXR, labs and PFTs  Next lab draw: pending tacrolimus dose, otherwise monthly    ~~~~~~~~~~~~~~~~~~~~~~~~~    Thoracic Transplant Office phone [phone] (alt [phone]), fax [phone]    Office Hours 8:30 - 5:00     For after-hours urgent issues, please dial [phone] (alt [phone]) and ask the  to page the Thoracic Transplant Coordinator On-Call.   --------------------  To expedite your medication refill(s), please contact your pharmacy and have them fax a refill request to: [phone]    *Please allow 3 business days for routine medication refills.  *Please allow 5 business  days for controlled substance medication refills.    **For Diabetic medications and supplies refill(s), please contact your pharmacy and have them contact your Endocrine team.  --------------------  For scheduling appointments call [phone] (alt [phone])  --------------------  Please Note: If you are active on [employer], all future test results will be sent by [employer] message only, and will no longer be called to patient. You may also receive communication directly from your physician.

## 2024-10-15 NOTE — NURSING NOTE
Administrations This Visit       influenza trivalent vaccine for ages 50-64 years (PF) (FLUBLOK) injection 0.5 mL       Admin Date  10/15/2024 Action  $Given Dose  0.5 mL Route  Intramuscular Documented By  Tyree Richardson, DARVIN Richardson CMA on 10/15/2024 at 10:26 AM

## 2024-10-16 DIAGNOSIS — D84.9 IMMUNOSUPPRESSED STATUS (H): ICD-10-CM

## 2024-10-16 DIAGNOSIS — Z94.2 LUNG REPLACED BY TRANSPLANT (H): ICD-10-CM

## 2024-10-16 DIAGNOSIS — I10 PRIMARY HYPERTENSION: Primary | ICD-10-CM

## 2024-10-16 LAB — IGG SERPL-MCNC: 374 MG/DL (ref 610–1616)

## 2024-10-16 RX ORDER — AMLODIPINE BESYLATE 10 MG/1
10 TABLET ORAL AT BEDTIME
Qty: 30 TABLET | Refills: 11 | Status: SHIPPED | OUTPATIENT
Start: 2024-10-16

## 2024-10-16 RX ORDER — ZINC SULFATE 50(220)MG
220 CAPSULE ORAL DAILY
Qty: 90 CAPSULE | Refills: 3 | Status: SHIPPED | OUTPATIENT
Start: 2024-10-16

## 2024-10-16 RX ORDER — TACROLIMUS 1 MG/1
CAPSULE ORAL
Qty: 360 CAPSULE | Refills: 3 | Status: SHIPPED | OUTPATIENT
Start: 2024-10-16

## 2024-10-16 RX ORDER — TACROLIMUS 0.5 MG/1
0.5 CAPSULE ORAL 2 TIMES DAILY
Qty: 60 CAPSULE | Refills: 11 | Status: SHIPPED | OUTPATIENT
Start: 2024-10-16

## 2024-10-16 RX ORDER — MYCOPHENOLIC ACID 180 MG/1
180 TABLET, DELAYED RELEASE ORAL 2 TIMES DAILY
Qty: 60 TABLET | Refills: 11 | Status: SHIPPED | OUTPATIENT
Start: 2024-10-16

## 2024-10-16 NOTE — PROGRESS NOTES
Tacrolimus level 6.1 at 13 hours, on 10/15/24.  Goal 8-10.   Current dose 1 mg in AM, 1.5 mg in PM    Dose changed to 1.5 mg in AM, 1.5 mg in PM   Recheck level in 7-10 days     MyChart message sent     Per provider, plan for pt to start zinc sulfate 220 mg for burning mouth syndrome.  If pt continues on medication, plan for copper level at pt's next clinic visit.

## 2024-10-17 NOTE — LETTER
PHYSICIAN ORDERS      DATE & TIME ISSUED: 2024 9:32 AM  PATIENT NAME: Edson Thornton Jr.   : 1965     [facility] MR# [if applicable]: [id number]     DIAGNOSIS:  Lung Transplant  Z94.2    Please call patient and schedule x1 doses of IVIG. Therapy plan faxed separately     Any questions please call: Alfred [phone]    Please fax these results to [phone].      .  Abiodun Woods MD  Division of Pulmonary, Allergy, Critical Care and Sleep Medicine  Professor of Medicine

## 2024-10-18 NOTE — PROGRESS NOTES
IgG 374    Will give x1 dose of IVIG     IVIG therapy plan faxed to patients PCP who will help order this.     Repeat level x1 month after infusion

## 2024-10-21 LAB — PROSPERA TRANSPLANT MONITORING: <0.08 %

## 2024-10-22 ENCOUNTER — TELEPHONE (OUTPATIENT)
Dept: TRANSPLANT | Facility: CLINIC | Age: 59
End: 2024-10-22
Payer: COMMERCIAL

## 2024-10-22 NOTE — TELEPHONE ENCOUNTER
I called [facility] after receiving a fax stating referral to [facility] has been received and to call this number to proceed with referral. I called and spoke with Bharati verifying patient information, insurance and diagnosis related to the referral. Because Imaging and labs were done at [facility], nothing else needed at this time.  Nurse will review and will then contact patient - could be 1-2 weeks as they are booking out to spring/summer but also depending on severity.

## 2024-10-23 ENCOUNTER — TELEPHONE (OUTPATIENT)
Dept: TRANSPLANT | Facility: CLINIC | Age: 59
End: 2024-10-23
Payer: COMMERCIAL

## 2024-10-23 DIAGNOSIS — Z94.2 S/P LUNG TRANSPLANT (H): ICD-10-CM

## 2024-10-23 NOTE — TELEPHONE ENCOUNTER
Provider Call: General  Route to LPN    Reason for call: Call from Melissa- she said [facility]  is faxing over a form for us to complete and they will give the infusion     Call back needed? No

## 2024-10-24 DIAGNOSIS — D84.9 IMMUNOSUPPRESSED STATUS (H): ICD-10-CM

## 2024-10-24 DIAGNOSIS — Z94.2 S/P LUNG TRANSPLANT (H): ICD-10-CM

## 2024-10-24 DIAGNOSIS — Z94.2 LUNG REPLACED BY TRANSPLANT (H): ICD-10-CM

## 2024-10-24 ASSESSMENT — ENCOUNTER SYMPTOMS: NEW SYMPTOMS OF CORONARY ARTERY DISEASE: 0

## 2024-10-24 NOTE — PROGRESS NOTES
Form for IVIG faxed to [facility]     Confirmed with patients insurance he does not need a PA

## 2024-10-28 LAB
DONOR IDENTIFICATION: NORMAL
DQB5: 563
DSA COMMENTS: NORMAL
DSA PRESENT: YES
DSA TEST METHOD: NORMAL
ORGAN: NORMAL
SA 1  COMMENTS: NORMAL
SA 1 CELL: NORMAL
SA 1 TEST METHOD: NORMAL
SA 2 CELL: NORMAL
SA 2 COMMENTS: NORMAL
SA 2 TEST METHOD: NORMAL
SA1 HI RISK ABY: NORMAL
SA1 MOD RISK ABY: NORMAL
SA2 HI RISK ABY: NORMAL
SA2 MOD RISK ABY: NORMAL
UNACCEPTABLE ANTIGENS: NORMAL
UNOS CPRA: 31

## 2024-11-03 ENCOUNTER — MYC REFILL (OUTPATIENT)
Dept: TRANSPLANT | Facility: CLINIC | Age: 59
End: 2024-11-03
Payer: COMMERCIAL

## 2024-11-03 DIAGNOSIS — I48.91 ATRIAL FIBRILLATION, UNSPECIFIED TYPE (H): ICD-10-CM

## 2024-11-03 DIAGNOSIS — Z94.2 S/P LUNG TRANSPLANT (H): ICD-10-CM

## 2024-11-04 DIAGNOSIS — Z94.2 S/P LUNG TRANSPLANT (H): ICD-10-CM

## 2024-11-04 RX ORDER — WARFARIN SODIUM 1 MG/1
TABLET ORAL
Qty: 420 TABLET | Refills: 1 | Status: SHIPPED | OUTPATIENT
Start: 2024-11-04

## 2024-11-04 NOTE — LETTER
PHYSICIAN ORDERS      DATE & TIME ISSUED: 2024 3:01 PM  PATIENT NAME: Edson Thornton Jr.   : 1965     [facility] MR# [if applicable]: [id number]     DIAGNOSIS:  Lung Transplant  Z94.2    2024:  Please draw PRA/DSA (use  kit provided by patient)    Any questions please call: Ariela [phone]    Please fax these results to [phone].      .  Abiodun Woods MD  Division of Pulmonary, Allergy, Critical Care and Sleep Medicine  Professor of Medicine

## 2024-11-04 NOTE — PROGRESS NOTES
Plan for repeat DSA mid December.   kits sent to pt's home address.  Lab order faxed.  Pt confirmed plan via [employer].

## 2024-11-04 NOTE — TELEPHONE ENCOUNTER
ANTICOAGULATION MANAGEMENT:  Medication Refill    Anticoagulation Summary  As of 10/15/2024      Warfarin maintenance plan:  5 mg (1 mg x 5) every Sun, Thu; 4 mg (1 mg x 4) all other days   Next INR check:  11/5/2024   Target end date:  Indefinite    Indications    Atrial fibrillation  unspecified type (H) [I48.91]  Encounter for long-term (current) use of high-risk medication [Z79.899]  Immunosuppressed status (H) [D84.9]  S/P lung transplant (H) [Z94.2]  Ischemic stroke (H) [I63.9]                 Anticoagulation Care Providers       Provider Role Specialty Phone number    Abiodun Woods MD Referring Pulmonary Disease [phone]            Refill Criteria    Visit with referring provider/group: Meets criteria: visit within referring provider group in the last 15 months on 10/15/24    [facility] referral last signed: 05/30/2024; within last year: Yes    Lab monitoring not exceeding 2 weeks overdue: Yes    Edson meets all criteria for refill. Rx instructions and quantity supplied updated to match patient's current dosing plan. Warfarin 90 day supply with 1 refill granted per [facility] protocol     ESSIE COLÓN RN  Anticoagulation Clinic

## 2024-11-05 ENCOUNTER — LAB (OUTPATIENT)
Dept: LAB | Facility: CLINIC | Age: 59
End: 2024-11-05
Payer: COMMERCIAL

## 2024-11-05 ENCOUNTER — ANTICOAGULATION THERAPY VISIT (OUTPATIENT)
Dept: ANTICOAGULATION | Facility: CLINIC | Age: 59
End: 2024-11-05
Payer: COMMERCIAL

## 2024-11-05 DIAGNOSIS — Z79.899 ENCOUNTER FOR LONG-TERM (CURRENT) USE OF HIGH-RISK MEDICATION: ICD-10-CM

## 2024-11-05 DIAGNOSIS — Z94.2 S/P LUNG TRANSPLANT (H): Chronic | ICD-10-CM

## 2024-11-05 DIAGNOSIS — I48.91 ATRIAL FIBRILLATION, UNSPECIFIED TYPE (H): Primary | ICD-10-CM

## 2024-11-05 DIAGNOSIS — D84.9 IMMUNOSUPPRESSED STATUS (H): ICD-10-CM

## 2024-11-05 DIAGNOSIS — Z94.2 S/P LUNG TRANSPLANT (H): ICD-10-CM

## 2024-11-05 DIAGNOSIS — I63.9 ISCHEMIC STROKE (H): ICD-10-CM

## 2024-11-05 DIAGNOSIS — Z94.2 LUNG REPLACED BY TRANSPLANT (H): ICD-10-CM

## 2024-11-05 LAB — INR (EXTERNAL): 2.5

## 2024-11-05 PROCEDURE — 80197 ASSAY OF TACROLIMUS: CPT

## 2024-11-05 NOTE — PROGRESS NOTES
ANTICOAGULATION MANAGEMENT     Edson Thornton Jr. 59 year old male is on warfarin with therapeutic INR result. (Goal INR 2.0-3.0)    Recent labs: (last 7 days)     11/05/24  0656   INR 2.5       ASSESSMENT     Source(s): Chart Review  Previous INR was Therapeutic last visit; previously outside of goal range  Medication, diet, health changes since last INR chart reviewed; none identified         PLAN     Recommended plan for no diet, medication or health factor changes affecting INR     Dosing Instructions: Continue your current warfarin dose with next INR in 3-4 weeks       Summary  As of 11/5/2024      Full warfarin instructions:  5 mg every Sun, Thu; 4 mg all other days   Next INR check:  12/3/2024               Detailed voice message left for Bret with dosing instructions and follow up date.   Sent [employer] message with dosing and follow up instructions    Patient using outside facility for labs    Education provided: Please call back if any changes to your diet, medications or how you've been taking warfarin  Goal range and lab monitoring: goal range and significance of current result and Importance of following up at instructed interval  Contact [phone] with any changes, questions or concerns.     Plan made per [facility] anticoagulation protocol    ESSIE COLÓN, RN  11/5/2024  Anticoagulation Clinic  [employer] for routing messages: p [facility] patient phone line: [phone]        _______________________________________________________________________     Anticoagulation Episode Summary       Current INR goal:  2.0-3.0   TTR:  61.7% (1 y)   Target end date:  Indefinite   Send INR reminders to:  [facility]    Indications    Atrial fibrillation  unspecified type (H) [I48.91]  Encounter for long-term (current) use of high-risk medication [Z79.899]  Immunosuppressed status (H) [D84.9]  S/P lung transplant (H) [Z94.2]  Ischemic stroke (H) [I63.9]             Comments:  --              Anticoagulation Care Providers       Provider Role Specialty Phone number    Abiodun Woods MD Referring Pulmonary Disease [phone]

## 2024-11-06 DIAGNOSIS — Z94.2 S/P LUNG TRANSPLANT (H): ICD-10-CM

## 2024-11-08 DIAGNOSIS — Z94.2 S/P LUNG TRANSPLANT (H): ICD-10-CM

## 2024-11-08 LAB
TACROLIMUS BLD-MCNC: 8.3 UG/L (ref 5–15)
TME LAST DOSE: NORMAL H
TME LAST DOSE: NORMAL H

## 2024-11-14 ENCOUNTER — MYC MEDICAL ADVICE (OUTPATIENT)
Dept: TRANSPLANT | Facility: CLINIC | Age: 59
End: 2024-11-14
Payer: COMMERCIAL

## 2024-11-14 DIAGNOSIS — Z94.2 S/P LUNG TRANSPLANT (H): ICD-10-CM

## 2024-12-09 ENCOUNTER — TELEPHONE (OUTPATIENT)
Dept: ANTICOAGULATION | Facility: CLINIC | Age: 59
End: 2024-12-09
Payer: COMMERCIAL

## 2024-12-09 NOTE — TELEPHONE ENCOUNTER
Action 12/9/24 MV 1.04pm   Action Taken Imaging request faxed to [facility]     Action 12/12/24 MV 5.25pm   Action Taken Images resolved in PACS         RECORDS RECEIVED FROM: internal   REASON FOR VISIT: Persistent tremors    PROVIDER: Dr. Al   DATE OF APPT: 2/18/25   NOTES (FOR ALL VISITS) STATUS DETAILS   OFFICE NOTE from referring provider Internal Dr Abiodun Woods @ [facility] SOT:  11/7/24 mychart encounter  10/15/24   MEDICATION LIST Internal    IMAGING  (FOR ALL VISITS)     CT (HEAD, NECK, SPINE) PACS [facility]:  CT Head 9/28/24  CT Cervical Spine 12/6/22  CT Head 12/6/22

## 2024-12-09 NOTE — TELEPHONE ENCOUNTER
ANTICOAGULATION     Edson Thornton Jr. is overdue for an INR check.     Spoke with Bret and he will check labs at his local clinic tomorrow.     Terrie Cedillo RN  12/9/2024  Anticoagulation Clinic  [facility] for routing messages: andrew [facility] patient phone line: [phone]

## 2024-12-10 ENCOUNTER — ANTICOAGULATION THERAPY VISIT (OUTPATIENT)
Dept: ANTICOAGULATION | Facility: CLINIC | Age: 59
End: 2024-12-10
Payer: COMMERCIAL

## 2024-12-10 ENCOUNTER — LAB (OUTPATIENT)
Dept: LAB | Facility: CLINIC | Age: 59
End: 2024-12-10
Payer: COMMERCIAL

## 2024-12-10 DIAGNOSIS — I48.91 ATRIAL FIBRILLATION, UNSPECIFIED TYPE (H): Primary | ICD-10-CM

## 2024-12-10 DIAGNOSIS — Z79.899 ENCOUNTER FOR LONG-TERM (CURRENT) USE OF HIGH-RISK MEDICATION: ICD-10-CM

## 2024-12-10 DIAGNOSIS — Z94.2 LUNG REPLACED BY TRANSPLANT (H): ICD-10-CM

## 2024-12-10 DIAGNOSIS — D84.9 IMMUNOSUPPRESSED STATUS (H): ICD-10-CM

## 2024-12-10 DIAGNOSIS — I63.9 ISCHEMIC STROKE (H): ICD-10-CM

## 2024-12-10 DIAGNOSIS — Z94.2 S/P LUNG TRANSPLANT (H): Chronic | ICD-10-CM

## 2024-12-10 LAB — INR (EXTERNAL): 2.3

## 2024-12-10 PROCEDURE — 80197 ASSAY OF TACROLIMUS: CPT

## 2024-12-10 NOTE — PROGRESS NOTES
ANTICOAGULATION MANAGEMENT     Edson Thornton Jr. 59 year old male is on warfarin with therapeutic INR result. (Goal INR 2.0-3.0)    Recent labs: (last 7 days)     12/10/24  0738   INR 2.3       ASSESSMENT     Source(s): Chart Review and Patient/Caregiver Call     Warfarin doses taken: Warfarin taken as instructed  Diet: No new diet changes identified  Medication/supplement changes: None noted  New illness, injury, or hospitalization: No  Signs or symptoms of bleeding or clotting: No  Previous result: Therapeutic last 2(+) visits  Additional findings: None       PLAN     Recommended plan for no diet, medication or health factor changes affecting INR     Dosing Instructions: Continue your current warfarin dose with next INR in 4 weeks       Summary  As of 12/10/2024      Full warfarin instructions:  5 mg every Sun, Thu; 4 mg all other days   Next INR check:  1/7/2025               Telephone call with Bret who verbalizes understanding and agrees to plan    Patient using outside facility for labs    Education provided: Please call back if any changes to your diet, medications or how you've been taking warfarin  Goal range and lab monitoring: goal range and significance of current result, Importance of therapeutic range, and Importance of following up at instructed interval    Plan made per [facility] anticoagulation protocol    Margaux Morrow, RN  12/10/2024  Anticoagulation Clinic  [employer] for routing messages: p [facility] patient phone line: [phone]        _______________________________________________________________________     Anticoagulation Episode Summary       Current INR goal:  2.0-3.0   TTR:  69.2% (1 y)   Target end date:  Indefinite   Send INR reminders to:  [facility]    Indications    Atrial fibrillation  unspecified type (H) [I48.91]  Encounter for long-term (current) use of high-risk medication [Z79.899]  Immunosuppressed status (H) [D84.9]  S/P lung transplant (H)  [Z94.2]  Ischemic stroke (H) [I63.9]             Comments:  --             Anticoagulation Care Providers       Provider Role Specialty Phone number    Abiodun Woods MD Referring Pulmonary Disease [phone]

## 2024-12-13 LAB
TACROLIMUS BLD-MCNC: 7.5 UG/L (ref 5–15)
TME LAST DOSE: NORMAL H
TME LAST DOSE: NORMAL H

## 2024-12-16 NOTE — RESULT ENCOUNTER NOTE
Tacrolimus level 7.5 at 12 hours, on 12/10/24.  Goal 8-10.   Current dose 1.5 mg in AM, 1.5 mg in PM    Level just below goal.  No dose change.    [employer] message sent      Never

## 2025-01-08 DIAGNOSIS — R25.1 TREMOR: Primary | ICD-10-CM

## 2025-01-08 PROBLEM — N18.30 CHRONIC KIDNEY DISEASE, STAGE 3 UNSPECIFIED (H): Status: ACTIVE | Noted: 2023-01-07

## 2025-01-08 PROBLEM — G47.33 OBSTRUCTIVE SLEEP APNEA (ADULT) (PEDIATRIC): Status: ACTIVE | Noted: 2023-02-02

## 2025-01-08 PROBLEM — G47.10 HYPERSOMNIA, UNSPECIFIED: Status: ACTIVE | Noted: 2023-02-02

## 2025-01-08 PROBLEM — M05.10 RHEUMATOID LUNG DISEASE (H): Status: ACTIVE | Noted: 2021-08-13

## 2025-01-08 PROBLEM — M79.89 OTHER SPECIFIED SOFT TISSUE DISORDERS: Status: ACTIVE | Noted: 2023-05-08

## 2025-01-08 PROBLEM — F34.1 DYSTHYMIC DISORDER: Status: ACTIVE | Noted: 2023-01-07

## 2025-01-08 PROBLEM — N40.0 BENIGN PROSTATIC HYPERPLASIA: Status: ACTIVE | Noted: 2025-01-08

## 2025-01-08 PROBLEM — Z79.899 LONG-TERM USE OF HIGH-RISK MEDICATION: Status: ACTIVE | Noted: 2021-05-11

## 2025-01-08 PROBLEM — Z94.2 LUNG TRANSPLANT STATUS (H): Status: ACTIVE | Noted: 2022-09-27

## 2025-01-08 PROBLEM — I69.398 OTHER SEQUELAE OF CEREBRAL INFARCTION: Status: ACTIVE | Noted: 2022-10-14

## 2025-01-08 PROBLEM — G25.81 RESTLESS LEG SYNDROME: Status: ACTIVE | Noted: 2022-08-29

## 2025-01-08 PROBLEM — E03.9 HYPOTHYROIDISM: Status: ACTIVE | Noted: 2017-09-15

## 2025-01-08 PROBLEM — F41.1 GENERALIZED ANXIETY DISORDER: Status: ACTIVE | Noted: 2022-12-22

## 2025-01-08 PROBLEM — Z77.29 EXPOSURE TO POTENTIALLY HAZARDOUS SUBSTANCE: Status: ACTIVE | Noted: 2024-03-22

## 2025-01-08 PROBLEM — D64.9 ANEMIA: Status: ACTIVE | Noted: 2025-01-08

## 2025-01-08 PROBLEM — J84.9 ILD (INTERSTITIAL LUNG DISEASE) (H): Status: ACTIVE | Noted: 2021-08-30

## 2025-01-08 PROBLEM — M15.0 PRIMARY OSTEOARTHRITIS INVOLVING MULTIPLE JOINTS: Status: ACTIVE | Noted: 2023-06-29

## 2025-01-08 PROBLEM — R50.9 FEVER, UNSPECIFIED: Status: ACTIVE | Noted: 2023-09-26

## 2025-01-08 PROBLEM — R10.9 UNSPECIFIED ABDOMINAL PAIN: Status: ACTIVE | Noted: 2023-05-31

## 2025-01-08 PROBLEM — Z79.52 LONG TERM (CURRENT) USE OF SYSTEMIC STEROIDS: Status: ACTIVE | Noted: 2021-05-14

## 2025-01-08 PROBLEM — E29.1 HYPOGONADISM, MALE: Status: ACTIVE | Noted: 2020-06-03

## 2025-01-08 PROBLEM — Q43.9 DUODENAL ANOMALY: Status: ACTIVE | Noted: 2021-07-19

## 2025-01-08 PROBLEM — G47.00 INSOMNIA, UNSPECIFIED: Status: ACTIVE | Noted: 2023-01-07

## 2025-01-08 PROBLEM — U07.1 COVID-19: Status: ACTIVE | Noted: 2023-02-18

## 2025-01-08 PROBLEM — F43.25 ADJUSTMENT DISORDER WITH MIXED DISTURBANCE OF EMOTIONS AND CONDUCT: Status: ACTIVE | Noted: 2023-01-07

## 2025-01-08 PROBLEM — Z48.24: Status: ACTIVE | Noted: 2022-09-26

## 2025-01-08 PROBLEM — D84.9 IMMUNOCOMPROMISED STATE: Status: ACTIVE | Noted: 2024-01-22

## 2025-01-08 PROBLEM — F41.9 ANXIETY: Status: ACTIVE | Noted: 2025-01-08

## 2025-01-08 PROBLEM — Z63.0 PROBLEMS IN RELATIONSHIP WITH SPOUSE OR PARTNER: Status: ACTIVE | Noted: 2023-01-07

## 2025-01-08 PROBLEM — Q43.8 OTHER SPECIFIED CONGENITAL MALFORMATIONS OF INTESTINE: Status: ACTIVE | Noted: 2021-08-30

## 2025-01-08 PROBLEM — H53.9 UNSPECIFIED VISUAL DISTURBANCE: Status: ACTIVE | Noted: 2022-10-14

## 2025-01-08 PROBLEM — R32 URINARY INCONTINENCE: Status: ACTIVE | Noted: 2023-04-07

## 2025-01-08 PROBLEM — R05.1 ACUTE COUGH: Status: ACTIVE | Noted: 2023-02-27

## 2025-01-08 PROBLEM — I63.9 CEREBROVASCULAR ACCIDENT (CVA) (H): Status: ACTIVE | Noted: 2022-03-25

## 2025-01-08 PROBLEM — R06.02 SHORTNESS OF BREATH: Status: ACTIVE | Noted: 2023-03-05

## 2025-01-08 PROBLEM — E11.9 TYPE 2 DIABETES MELLITUS WITHOUT COMPLICATIONS (H): Status: ACTIVE | Noted: 2023-01-07

## 2025-01-08 PROBLEM — R05.9 COUGH, UNSPECIFIED: Status: ACTIVE | Noted: 2023-09-06

## 2025-01-08 PROBLEM — F32.89 OTHER SPECIFIED DEPRESSIVE EPISODES: Status: ACTIVE | Noted: 2024-12-18

## 2025-01-08 PROBLEM — M05.79 RHEUMATOID ARTHRITIS WITH RHEUMATOID FACTOR OF MULTIPLE SITES WITHOUT ORGAN OR SYSTEMS INVOLVEMENT (H): Status: ACTIVE | Noted: 2022-10-14

## 2025-01-08 PROBLEM — R06.83 SNORING: Status: ACTIVE | Noted: 2023-02-02

## 2025-01-08 PROBLEM — R45.851 SUICIDAL IDEATIONS: Status: ACTIVE | Noted: 2023-01-07

## 2025-01-08 PROBLEM — H91.93 UNSPECIFIED HEARING LOSS, BILATERAL: Status: ACTIVE | Noted: 2023-06-12

## 2025-01-08 PROBLEM — F43.22 ADJUSTMENT DISORDER WITH ANXIETY: Status: ACTIVE | Noted: 2023-01-07

## 2025-01-08 PROBLEM — D84.9 IMMUNODEFICIENCY, UNSPECIFIED: Status: ACTIVE | Noted: 2022-10-18

## 2025-01-08 PROBLEM — I48.0 PAROXYSMAL ATRIAL FIBRILLATION (H): Status: ACTIVE | Noted: 2022-03-25

## 2025-01-08 PROBLEM — T38.0X5A STEROID-INDUCED DIABETES MELLITUS: Status: ACTIVE | Noted: 2022-03-25

## 2025-01-08 PROBLEM — M05.9 SEROPOSITIVE RHEUMATOID ARTHRITIS (H): Status: ACTIVE | Noted: 2024-11-25

## 2025-01-08 PROBLEM — Z86.79 PERSONAL HISTORY OF OTHER DISEASES OF THE CIRCULATORY SYSTEM: Status: ACTIVE | Noted: 2023-05-05

## 2025-01-08 PROBLEM — F32.A DEPRESSION, UNSPECIFIED: Status: ACTIVE | Noted: 2022-12-22

## 2025-01-08 PROBLEM — Z94.2: Status: ACTIVE | Noted: 2025-01-08

## 2025-01-08 PROBLEM — Z94.2 HISTORY OF LUNG TRANSPLANT (H): Status: ACTIVE | Noted: 2022-06-16

## 2025-01-08 PROBLEM — E09.9 STEROID-INDUCED DIABETES MELLITUS: Status: ACTIVE | Noted: 2022-03-25

## 2025-01-08 PROBLEM — N18.9 CHRONIC KIDNEY DISEASE: Status: ACTIVE | Noted: 2025-01-08

## 2025-01-08 PROBLEM — G47.09 OTHER INSOMNIA: Status: ACTIVE | Noted: 2022-12-22

## 2025-01-08 PROBLEM — M06.9 RHEUMATOID ARTHRITIS, UNSPECIFIED (H): Status: ACTIVE | Noted: 2023-01-07

## 2025-01-08 PROBLEM — E78.5 HYPERLIPIDEMIA: Status: ACTIVE | Noted: 2017-09-15

## 2025-01-08 PROBLEM — F33.42 MAJOR DEPRESSIVE DISORDER, RECURRENT EPISODE, IN FULL REMISSION: Status: ACTIVE | Noted: 2022-12-22

## 2025-01-08 PROBLEM — F41.9 ANXIETY DISORDER, UNSPECIFIED: Status: ACTIVE | Noted: 2022-12-22

## 2025-01-08 PROBLEM — I10 ESSENTIAL HYPERTENSION: Status: ACTIVE | Noted: 2017-09-15

## 2025-01-08 PROBLEM — K21.9 GASTROESOPHAGEAL REFLUX DISEASE: Status: ACTIVE | Noted: 2025-01-08

## 2025-01-08 RX ORDER — PROPRANOLOL HYDROCHLORIDE 60 MG/1
60 CAPSULE, EXTENDED RELEASE ORAL DAILY
COMMUNITY
Start: 2024-11-12 | End: 2025-11-17

## 2025-01-08 RX ORDER — METOPROLOL SUCCINATE 25 MG/1
25 TABLET, EXTENDED RELEASE ORAL DAILY
COMMUNITY
Start: 2024-12-03 | End: 2025-12-08

## 2025-01-08 RX ORDER — FLUOXETINE 40 MG/1
40 CAPSULE ORAL EVERY MORNING
COMMUNITY
Start: 2024-12-27

## 2025-01-08 RX ORDER — LAMOTRIGINE 200 MG/1
200 TABLET ORAL AT BEDTIME
COMMUNITY
Start: 2024-12-24

## 2025-01-08 RX ORDER — METOPROLOL SUCCINATE 50 MG/1
1 TABLET, EXTENDED RELEASE ORAL DAILY
COMMUNITY
Start: 2024-11-12

## 2025-01-08 NOTE — PROGRESS NOTES
[address]  [place] [address]  Mobile Phone  [phone]  Email  [email or web address]    Peg Eason  Emergency Contact, Significant other    Zenia Jenaro  Emergency Contact, Stepchild    2024 numerous appointments  Seen Bharati Jeff neurologist ? Zay    Diagnosis/Summary/Recommendations:    PATIENT: Edson Thornton Jr.  59 year old male     : 1965    DAYAMI: 2025       MRN: [id number]      Assessment:  (R25.1) Tremor  (primary encounter diagnosis)  Plan: Med Therapy Management Referral    Review of diagnosis    tremor    Avoidance of dopamine blockers   Transplant medications  No antidopaminergic treatments    Motor complication review   tremor    Review of Impulse control disorders   N/a    Review of surgical or medication options   reviewed    Gait/Balance/Falls       Exercise/Therapy performed/offered       Cognitive/Driving       Mood   Anxiety  Depression  Adjustment disorder  Dysthymia  Suicidal ideation      Hallucinations/delusions       Sleep   RLS    Bladder/Renal/Prostate/Gyn/Other   Bph  Hypomagnesemia  Acute kidney issues     GI/Constipation/GERD   GERD  Duodenal abnormality     ENDO/Lipid/DM/Bone density/Thyroid  Steroid induced diabetes  Lipid disorder  Hypogonadism male  Hypothyroidism       Cardio/heart/Hyper or Hypotensive   Paroxysmal atrial fibrillation  Anticoagulated  Stroke   hypertension    Vision/Dry Eyes/Cataracts/Glaucoma/Macular   Visual changes     Heme/Anticoagulation/Antiplatelet/Anemia/Other  Immunocompromised  Seropositive rheumatoid arthritis  Low immunoglobulin level   Anticoagulated  Pulmonary embolism     ENT/Resp  Covid19 infection  Cough  Fever  Lung infection  Lung transplant  Aspergillus  Pulmonary embolism   Bronchial hemorrhage  Pulmonary air trapping  ILD interstitial lung disease  Rheumatoid lung disease     Skin/Cancer/Seborrhea/other      Musculoskeletal/Pain/Headache  History of stroke  Osteoarthritis of multiple joints    Soft tissue disorder  Rheumatoid arthritis     Other:      Medications            Acetaminophen tylenol 325mg        Amlodipine norvasc 10mg         Conner carb vit D        Conner Carb Vit D        Carvedilol ER Coreg CR 20mg 24h        Diclofenac Voltaren 1% gel        Famotidine pepcid 20mg         Ferrous sulfate ferosul 325 65 fe        Finasteride proscar 5mg         Fluoxetine prozac 20mg         Fluoxetine prozac 40mg         Fluticasone vilanterol breo ellipta 200 25 inhaler        Hydroxychloroquine plaquenil 200mg         Insulin aspar novolog        Lamotrigine lamictal 200mg        Lamotrigine lamictal 25mg         Levalbuterol xopenex inhaler        Levothyroxine synthroid 25mcg         Magnesium glycinate 100mg         Metoprolol succ ER Toprol XL 25mg 24h        Metoprolol Succ ER Toprol XL 50mg 24h        MIrtazapine remeron 7.5mg        MVI         Mycophenolic acid 180mg        Ondansetron zofran ODT 4mg        Pantoprazole protonix 40mg         Prednisone deltasone 2.5mg        Prednisone deltasone 5mg         Propranolol ER Inderal LA 60mg 24h        Ropinirole requip 0.25mg        Rosuvastatin crestor 10mg         Senna docusate senokot S 8.6 50        Sildenafil viagra 100mg         Sulfamethoxazole trimethoprim bactrim         Tacrolimus 0.5mg         Tacrolimus 1mg         Warfarin 1 mg coumadin        Zinc sulfate zincate 22 mg                    Pharmacy ([facility]) consultation and medication management  Please call the scheduling number @ [phone] to set up an appointment with pharmacists Karlee Osborne, Hawa Salazar, or Tamra Guo.     3/30/2022  [facility] [place]     History of Present Illness: Edson Thornton is a 56 year old male presenting for follow-up        Edson Thornton is a 56 year old male patient with history of atrial fibrillation, RA, and ILD s/p lung transplant 10/16/21, who suffered multifocal bilateral embolic infarcts particularly in  bilateral occipital cortices, L corona radiata, and R frontal lobe for whom the stroke neurology service is re-involved for reported worsening exam off sedation. Work up is detailed below and included negative EEG and LP as well.      It was thought the mechanism of his stroke to be related to Afib and AC was recommended. Pt is doing fairly well. He was in rehab for 2 weeks and now reports some need of assistance with is activities. He walks independently and is doing all ADLs by himself. He reports issues with is vision and blind spot and is seeing ophthalmology for it. I did tell  Him that these vision changes are likely due to location of stroke. He reports cognitive changes as well but is currently independent with is ADLs. He continues to take medications without any side effects.            MRI/Head CT MRI Brain (10/26/2021):  Impression:  1. Evolving acute age multifocal acute infarctions in both cerebral  hemispheres and left cerebellum. No new areas of infarction when  compared with prior MRI brain on 10/23/2021.  2. Minimally increased punctate regions of susceptibility effect  within areas of infarction, corresponding to petechial hemorrhage  within previously identified multifocal acute infarctions.  3. Head MRA demonstrates no definite aneurysm or stenosis of the major  intracranial arteries.  4. Neck MRA demonstrates patent major cervical arteries.     MRI Brain (10/23/2021):  Impression:  Multifocal subacute infarcts within both cerebral hemispheres and left cerebellum all of which appear present on prior head CT 10/22/2021.  Minimal petechial hemorrhage associated with the infarct in the left occipital lobe.     Head CT (10/22/2021) : multiple scattered subacute ischemic strokes, predating exam change from 10/22.  May contribute to encephalopathy, but would not explain lack of extremity movement.      Head CT (10/25/2021): This exam is significantly limited by motion and streak artifact. L posterior  parietal lobe acute/subacute infarct is identified on current exam w/ remaining previously seen infarct possibly obscured by artifact. No CT evidence for new cortical hypoattentuation to indicate a new acute infarct. No definite acute intracranial hemorrhage or midline shift      Intracranial Vasculature    Head CTA (10/25/2021): No intracranial arterial large vessel occlusion or significant stenosis/occlusion     Head CTA (10/22/2021): demonstrates no aneurysm or stenosis of major intracranial arteries.     CT Perfusion (10/22/2021): focally decreased cerebral blood volume and cerebral blood flow in the posterior most left parieto-occipital region with corresponding slight increase in TTP possibly representing an area of hypoperfusion or core infarct.       Cervical Vasculature    CTA Neck (10/22/2021): Mild stenosis in the R vertebral artery w/ severe high-grade stenosis at the L vertebral artery origin     CTA Neck (10/22/2021): No stenosis of major cervical arteries      Echocardiogram TC reported no valvular vegetations and no pulmonary vein thrombosis per primary team.      EKG/Telemetry    Sinus tachycardia   Otherwise normal ECG   When compared with ECG of 21-OCT-2021 06:50,   ST no longer depressed in Anterior leads   ST no longer elevated in Lateral leads       Other Testing    EEG showed no epileptiform discharges x2.  Positive for diffuse encephalopathy x2.       LDL direct 130   A1C  5.3          .     Impression:       Problem List Items Addressed This Visit               Nervous and Auditory     Ischemic stroke (H) - Primary          Circulatory     Atrial fibrillation, unspecified type (H)           Future Appointments 1/8/2025 - 7/7/2025        Date Visit Type Length Department Provider     2/18/2025  8:00 AM DX HIP/PELVIS/SPINE W LAT FX A 30 min [facility] DEXA [id number]    Location Instructions:     [facility] [place] with multiple transportation and parking  options. You may wish to review options for  service and self-parking in more detail on the [facility] website at [email or web address].              2/18/2025  8:40 AM CT CHEST WO 20 min [facility] CT [email or web address]    Location Instructions:     The [facility] [place] a dense urban area with multiple transportation and parking options. You may wish to review options for  service and self-parking in more detail on the [facility] website at [email or web address] [email or web address]/[facility].               2/18/2025  9:00 AM LAB 15 min [facility] LABORATORY [facility] LAB    Location Instructions:     The [facility] [place] a dense urban area with multiple transportation and parking options. You may wish to review options for  service and self-parking in more detail on the [facility] website at [email or web address] [email or web address]/[facility].&nbsp;               2/18/2025  9:15 AM XR CHEST 2 VIEWS 20 min [facility] XRAY [email or web address]    Location Instructions:     The [facility] ([facility] a dense urban area with multiple transportation and parking options. You may wish to review options for  service and self-parking in more detail on the [facility] website at [email or web address] [email or web address]/[facility].&nbsp;                2/18/2025 10:00 AM 6 MIN WALK OR O2 QUALIFY 30 min [facility] PULM FUNC TESTING UC PFL 6 MINUTE WALK 1    Location Instructions:     The [facility] ([facility] in a dense urban area with multiple transportation and parking options. You may wish to review options for  service and self-parking in more detail on the [facility] website at [email or web address].&nbsp;                2/18/2025 10:30 AM FULL PFT W-WO MIP/MEP/MVV 60 min [facility] PULM FUNC TESTING UC PFL B    Location Instructions:     The [facility] ([facility] a dense urban area with multiple transportation and parking options. You may wish to review options for  service and self-parking in more detail on the [facility] website at  [email or web address]nbsp;                2/18/2025 11:50 AM LUNG POST RETURN TXP PULM 40 min  STEPHANIET Abiodun Woods MD    Location Instructions:     The [facility] [place] a dense urban area with multiple transportation and parking options. You may wish to review options for  service and self-parking in more detail on the [facility] website at [email or web address]/[facility].   This appointment is in a hospital-based location.&nbsp; Before your visit, you may want to check with your insurance company for coverage and referral options, including cost differences between services provided in different clinic settings.&nbsp; For more information visit this link on the [employer] Website:&nbsp; [email or web address]              2/18/2025  2:00 PM NEW MOVEMENT DISORDER 60 min [facility] NEUROLOGY Rashaad Al MD    Location Instructions:     The [facility] [place] in a dense urban area with multiple transportation and parking options. You may wish to review options for  service and self-parking in more detail on the [facility] website at [email or web address]/[facility].&nbsp;                4/29/2025  9:00 AM RETURN ENDOCRINE 30 min [facility] ENDO Billie Doshi MD    Location Instructions:     The [facility] [place] is in a dense urban area with multiple transportation and parking options. You may wish to review options for  service and self-parking in more detail on the [facility] website at [email or web address]/[facility].                         Vida Garay   MV    12/9/24  1:06 PM  Note  Action 12/9/24 MV 1.04pm   Action Taken Imaging request faxed to [place]      Action 12/12/24 MV 5.25pm   Action Taken Images resolved in PACS                 RECORDS RECEIVED FROM: internal   REASON FOR VISIT: Persistent tremors    PROVIDER: Dr. Al   DATE OF APPT: 2/18/25   NOTES (FOR ALL VISITS) STATUS DETAILS   OFFICE NOTE from referring provider Internal  Dr Abiodun Woods @ [facility] SOT:  11/7/24 mychart encounter  10/15/24   MEDICATION LIST Internal     IMAGING  (FOR ALL VISITS)       CT (HEAD, NECK, SPINE) PACS [place]:  CT Head 9/28/24  CT Cervical Spine 12/6/22  CT Head 12/6/22

## 2025-01-12 ENCOUNTER — TELEPHONE (OUTPATIENT)
Dept: TRANSPLANT | Facility: CLINIC | Age: 60
End: 2025-01-12
Payer: COMMERCIAL

## 2025-01-12 DIAGNOSIS — Z94.2 S/P LUNG TRANSPLANT (H): ICD-10-CM

## 2025-01-12 LAB
DONOR IDENTIFICATION: NORMAL
DSA COMMENTS: NORMAL
DSA PRESENT: NO
DSA TEST METHOD: NORMAL
ORGAN: NORMAL
SA 1  COMMENTS: NORMAL
SA 1 CELL: NORMAL
SA 1 TEST METHOD: NORMAL
SA 2 CELL: NORMAL
SA 2 COMMENTS: NORMAL
SA 2 TEST METHOD: NORMAL
SA1 HI RISK ABY: NORMAL
SA1 MOD RISK ABY: NORMAL
SA2 HI RISK ABY: NORMAL
SA2 MOD RISK ABY: NORMAL
UNACCEPTABLE ANTIGENS: NORMAL
UNOS CPRA: 31

## 2025-01-13 DIAGNOSIS — Z94.2 S/P LUNG TRANSPLANT (H): ICD-10-CM

## 2025-01-13 NOTE — LETTER
PHYSICIAN ORDERS      DATE & TIME ISSUED: 2025 8:22 AM  PATIENT NAME: Edson Thornton Jr.   : 1965     [facility] MR# [if applicable]: [id number]     DIAGNOSIS:  Lung Transplant  Z94.2    Week of 25:  Please check IgG level.    Any questions please call: Ariela [phone]    Please fax these results to [phone].      .  Abiodun Woods MD  Division of Pulmonary, Allergy, Critical Care and Sleep Medicine  Professor of Medicine

## 2025-01-14 ENCOUNTER — TELEPHONE (OUTPATIENT)
Dept: ANTICOAGULATION | Facility: CLINIC | Age: 60
End: 2025-01-14
Payer: COMMERCIAL

## 2025-01-14 NOTE — TELEPHONE ENCOUNTER
ANTICOAGULATION     Edson Thornton Jr. is overdue for an INR check.     Spoke with Bret and he was diagnosed with the flu on 1/10.  Bret had labs done yesterday but an INR wasn't drawn. Patient will try to get in this week yet for an INR.     Terrie Cedillo, RN  1/14/2025  Anticoagulation Clinic  [facility] for routing messages: andrew ROSALES [facility]  ACC patient phone line: [phone]

## 2025-01-18 ENCOUNTER — TELEPHONE (OUTPATIENT)
Dept: TRANSPLANT | Facility: CLINIC | Age: 60
End: 2025-01-18
Payer: COMMERCIAL

## 2025-01-18 DIAGNOSIS — Z94.2 S/P LUNG TRANSPLANT (H): ICD-10-CM

## 2025-01-18 NOTE — LETTER
2025    Patient Name: Edson Thornton Jr.   :  1965   MRN: [id number]  ICD10:  z94.1 , z79.899    Subject: Request for Labs    Edson Thornton Jr. is a heart transplant patient currently being followed by the [facility].  We would like to request your assistance in obtaining the following laboratory tests which are part of our routine surveillance program for heart transplant recipients.  (Items needed are checked.)    Please fax the lab results as soon as they are available to:   Thoracic Transplant Department  Fax Number:  [phone]     Item Frequency   X CBC with platelets Once in 2025   X WBC with differential Once in 2025     Thank you  for your continued support and the opportunity to collaborate in the care of this patient.  If you have any questions, please call the Thoracic Transplant Team at [phone] or [phone].     .  Abiodun Woods MD  Division of Pulmonary, Allergy, Critical Care and Sleep Medicine  Professor of Medicine

## 2025-01-18 NOTE — TELEPHONE ENCOUNTER
Triage Note:  Pt called to report that labs done at [facility] [place] have [place], and are abnormal.  Writer reviewed labs- WBC is elevated- 14.2.  Pt has a recent diagnosis of the Influenza and has completed course of Tamiflu.    Chest x ray was also done at [facility]- x ray is clear.  Pt denies fever, cough, or recent increase of shortness of breath.  Reviewed above with on-call Pulm MD, Dr. Woods.    Plan to recheck CBC early/mid next week. Pt to update coordinator if he develops fever or signs of infection.  Pt states understanding instructions and plan of care.  Orders faxed to [facility] and primary TTC updated.

## 2025-01-19 ENCOUNTER — HEALTH MAINTENANCE LETTER (OUTPATIENT)
Age: 60
End: 2025-01-19

## 2025-01-20 ENCOUNTER — ANTICOAGULATION THERAPY VISIT (OUTPATIENT)
Dept: ANTICOAGULATION | Facility: CLINIC | Age: 60
End: 2025-01-20
Payer: COMMERCIAL

## 2025-01-20 DIAGNOSIS — I48.91 ATRIAL FIBRILLATION, UNSPECIFIED TYPE (H): Primary | ICD-10-CM

## 2025-01-20 DIAGNOSIS — I63.9 ISCHEMIC STROKE (H): ICD-10-CM

## 2025-01-20 DIAGNOSIS — D84.9 IMMUNOSUPPRESSED STATUS: ICD-10-CM

## 2025-01-20 DIAGNOSIS — Z94.2 S/P LUNG TRANSPLANT (H): Chronic | ICD-10-CM

## 2025-01-20 DIAGNOSIS — Z79.899 ENCOUNTER FOR LONG-TERM (CURRENT) USE OF HIGH-RISK MEDICATION: ICD-10-CM

## 2025-01-20 LAB — INR (EXTERNAL): 9.7

## 2025-01-20 RX ORDER — PREDNISONE 20 MG/1
TABLET ORAL
COMMUNITY
Start: 2025-01-17 | End: 2025-02-18

## 2025-01-20 RX ORDER — PHYTONADIONE 5 MG/1
5 TABLET ORAL ONCE
Status: CANCELLED | OUTPATIENT
Start: 2025-01-20 | End: 2025-01-20

## 2025-01-20 RX ORDER — PHYTONADIONE 5 MG/1
2.5 TABLET ORAL ONCE
Qty: 1 TABLET | Refills: 0 | Status: SHIPPED | OUTPATIENT
Start: 2025-01-20 | End: 2025-01-20

## 2025-01-20 NOTE — PROGRESS NOTES
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 incontinence    Paroxysmal atrial fibrillation (H)    COVID-19    Long term (current) use of systemic steroids    ILD (interstitial lung disease) (H)    Rheumatoid lung disease (H)    Cerebrovascular accident (CVA) (H)    Essential hypertension    History of lung transplant (H)    Encounter for aftercare following lung transplant (H)    Lung transplant status (H)        No Known Allergies  Past Surgical History:   Procedure Laterality Date    BRONCHOSCOPY (RIGID OR FLEXIBLE), DIAGNOSTIC N/A 1/26/2022    Procedure: BRONCHOSCOPY, WITH BRONCHOALVEOLAR LAVAGE AND BIOPSY;  Surgeon: Perlman, David Morris, MD;  Location: [facility]    BRONCHOSCOPY (RIGID OR FLEXIBLE), DIAGNOSTIC N/A 4/19/2022    Procedure: BRONCHOSCOPY, WITH BRONCHOALVEOLAR LAVAGE AND BIOPSY;  Surgeon: Sherine Merrill MD;  [facility]    BRONCHOSCOPY (RIGID OR FLEXIBLE), DIAGNOSTIC N/A 6/21/2022    Procedure: BRONCHOSCOPY, WITH BRONCHOALVEOLAR LAVAGE AND BIOPSY;  Surgeon: Aneesh Rubio MD;  [facility]    BRONCHOSCOPY FLEXIBLE AND RIGID N/A 11/15/2023    Procedure: Surveillance Bronchoscopy with airway check;  Surgeon: Ron Daniels MD;  [facility]     COLONOSCOPY W/ BIOPSIES AND POLYPECTOMY  07/21/2020    CV CORONARY ANGIOGRAM N/A 09/08/2021    Procedure: Coronary Angiogram with possible intervention;  Surgeon: Jovon Bullock MD;  Location:  [facility]    CV RIGHT HEART CATH MEASUREMENTS RECORDED N/A 09/08/2021    Procedure: Right Heart Cath;  Surgeon: Jovon Bullock MD;  Location:  [facility]    ESOPHAGOSCOPY, GASTROSCOPY, DUODENOSCOPY (EGD), COMBINED N/A 10/23/2021    Procedure: ESOPHAGOGASTRODUODENOSCOPY (EGD);  Surgeon: Miquel Pisano MD;  Location:  GI    ESOPHAGOSCOPY, GASTROSCOPY, DUODENOSCOPY (EGD), COMBINED N/A 11/02/2021    Procedure: ESOPHAGOGASTRODUODENOSCOPY (EGD);  Surgeon: Daniel Ortiz MD;  Location:  GI    ESOPHAGOSCOPY, GASTROSCOPY, DUODENOSCOPY (EGD), COMBINED N/A 11/5/2021    Procedure: ESOPHAGOGASTRODUODENOSCOPY (EGD);  Surgeon: Ronnell Hernandez MD;  Location:  GI    EXTRACTION(S) DENTAL N/A 09/22/2021    Procedure: EXTRACTION tooth #19;  Surgeon: Deepak Tobin DDS;  [facility]    HERNIA REPAIR      IR CHEST TUBE PLACEMENT NON-TUNNELLED LEFT  11/03/2021    PICC TRIPLE LUMEN PLACEMENT Left 11/04/2021    5FR TL PICC. Right non occlusive thrombus subclavian vein.    REPLACE GASTROJEJUNOSTOMY TUBE, PERCUTANEOUS N/A 11/9/2021    Procedure: REPLACEMENT, GASTROJEJUNOSTOMY TUBE, PERCUTANEOUS;  Surgeon: Hernando Rodriguez MD;  Location: [facility]    right acl      TRACHEOSTOMY PERCUTANEOUS N/A 10/29/2021    Procedure: Percutaneous Tracheostomy,;  Surgeon: Celine Jenkins MD;  [facility]    TRANSPLANT LUNG RECIPIENT SINGLE X2 Bilateral 10/16/2021    Procedure: TRANSPLANT, LUNG, RECIPIENT, BILATERAL, Bronchoscopy, on-pump perfusion, bilateral clamshell sternotomy;  Surgeon: Yanick Corral MD;  [facility]    XR ACROMIOCLAVICULAR JOINT BILATERAL       Past Medical History:   Diagnosis Date    BRENNAN (acute kidney injury) 10/17/2021    Anxiety      Aspergillus (H) 03/08/2023    Depression     HLD (hyperlipidemia)     HTN (hypertension)     Hypothyroidism     ILD (interstitial lung disease) (H)     Infection due to 2019 novel coronavirus 03/2023    Infection due to 2019 novel coronavirus 11/2023    Treated with remdesivir    Lung infection 07/12/2023    SALTY on CPAP     Oxygen dependent     BL 4L since ~6/2021    Primary hypertension 02/28/2022    Rheumatoid arthritis (H)     signs ~5/2020, dx 5/2021    S/P lung transplant (H) 10/16/2021    Shock liver 10/17/2021    Steroid-induced hyperglycemia     Traction bronchiectasis (H)     Tremor 01/08/2025     Social History     Socioeconomic History    Marital status: Single     Spouse name: Not on file    Number of children: Not on file    Years of education: Not on file    Highest education level: Not on file   Occupational History    Not on file   Tobacco Use    Smoking status: Never    Smokeless tobacco: Former    Tobacco comments:     Quit chewing over 20 years ago.   Vaping Use    Vaping status: Never Used   Substance and Sexual Activity    Alcohol use: Never    Drug use: Never    Sexual activity: Not on file   Other Topics Concern    Parent/sibling w/ CABG, MI or angioplasty before 65F 55M? Not Asked   Social History Narrative    Full time  for the [employer] starting May 24, 2023.     Social Drivers of Health     Financial Resource Strain: Low Risk  (9/20/2022)    [facility]    Overall Financial Resource Strain (CARDIA)     Difficulty of Paying Living Expenses: Not very hard   Food Insecurity: No Food Insecurity (9/20/2022)    [facility], [facility]    Hunger Vital Sign     Worried About Running Out of Food in the Last Year: Never true     Ran Out of Food in the Last Year: Never true   Transportation Needs: No Transportation Needs (9/20/2022)    Received from [facility]  [facility]    PRAPARE - Transportation     Lack of Transportation (Medical): No     Lack of Transportation (Non-Medical): No   Physical Activity: Insufficiently Active (9/20/2022)    Received from [facility]    Exercise Vital Sign     Days of Exercise per Week: 4 days     Minutes of Exercise per Session: 20 min   Stress: Stress Concern Present (8/25/2021)    Received from [facility]    [place] [employer] - Occupational Stress Questionnaire     Feeling of Stress : Rather much   Social Connections: Moderately Isolated (8/25/2021)    Received from [facility]    Social Connection and Isolation Panel [NHANES]     Frequency of Communication with Friends and Family: Twice a week     Frequency of Social Gatherings with Friends and Family: Never     Attends Evangelical Services: Never     Active Member of Clubs or Organizations: Yes     Attends Club or Organization Meetings: Never     Marital Status: Living with partner   Interpersonal Safety: Not on file   Housing Stability: Low Risk  (8/25/2021)    Received from [facility]    Housing Stability Vital Sign     Unable to Pay for Housing in the Last Year: No     Number of Places Lived in the Last Year: 2     Unstable Housing in the Last Year: No       Drug and lactation database from the [place] [employer] of Medicine:  http://toxnet.nlm.nih.gov/cgi-bin/sis/htmlgen?LACT      B/P: Data Unavailable, T: Data Unavailable, P: Data Unavailable, R: Data Unavailable 0 lbs 0 oz  There were no vitals taken for this visit., There is no height or weight on file to calculate BMI.  Medications and Vitals not listed above were documented in the cart and reviewed by me.     Current Outpatient Medications   Medication Sig Dispense  Refill    predniSONE (DELTASONE) 20 MG tablet       acetaminophen (TYLENOL) 325 MG tablet 3 tablets (975 mg) by Oral or Feeding Tube route every 8 hours as needed for mild pain 100 tablet 11    amLODIPine (NORVASC) 10 MG tablet Take 1 tablet (10 mg) by mouth at bedtime. 30 tablet 11    Calcium Carbonate-Vitamin D (CALCIUM 600 +D HIGH POTENCY) 600-10 MG-MCG TABS 1 tablet by Oral or Feeding Tube route daily 30 tablet 11    calcium carbonate-vitamin D (CALTRATE) 600-10 MG-MCG per tablet Take 1 tablet by mouth daily.      carvedilol ER (COREG CR) 20 MG 24 hr capsule Take 2 capsules (40 mg) by mouth daily 60 capsule 11    diclofenac (VOLTAREN) 1 % topical gel Apply 4 g topically 4 times daily Both hands 150 g 11    famotidine (PEPCID) 20 MG tablet Take 20 mg by mouth daily      ferrous sulfate (FEROSUL) 325 (65 Fe) MG tablet Take 1 tablet (325 mg) by mouth daily (with breakfast) 90 tablet 3    finasteride (PROSCAR) 5 MG tablet Take 5 mg by mouth daily      FLUoxetine (PROZAC) 20 MG capsule Take 2 capsules (40 mg) by mouth daily.      FLUoxetine (PROZAC) 40 MG capsule Take 40 mg by mouth every morning.      fluticasone-vilanterol (BREO ELLIPTA) 200-25 MCG/ACT inhaler Inhale 1 puff into the lungs daily. 28 each 11    hydroxychloroquine (PLAQUENIL) 200 MG tablet Take 1 tablet (200 mg) by mouth 2 times daily 180 tablet 3    insulin aspart (NOVOLOG PEN) 100 UNIT/ML pen Take 1-3 units TID if BS over 200. Max daily dose is 12 15 mL 11    insulin pen needle (32G X 4 MM) 32G X 4 MM miscellaneous Use 4 pen needles daily or as directed. 120 each 11    lamoTRIgine (LAMICTAL) 200 MG tablet Take 200 mg by mouth at bedtime.      lamoTRIgine (LAMICTAL) 25 MG tablet Take 250 mg by mouth daily.      levalbuterol (XOPENEX HFA) 45 MCG/ACT inhaler INHALE 2 PUFFS BY MOUTH EVERY 4 HOURS AS NEEDED FOR WHEEZING FOR SHORTNESS OF BREATH 15 g 0    levothyroxine (SYNTHROID/LEVOTHROID) 25 MCG tablet Take 1 tablet (25 mcg) by mouth daily 30 tablet 11     magnesium glycinate 100 MG CAPS capsule Take 4 capsules (400 mg) by mouth 3 times daily      metoprolol succinate ER (TOPROL XL) 25 MG 24 hr tablet Take 25 mg by mouth daily.      metoprolol succinate ER (TOPROL XL) 50 MG 24 hr tablet Take 1 tablet by mouth daily.      mirtazapine (REMERON) 7.5 MG tablet Take 7.5 mg by mouth At Bedtime      multivitamin w/minerals (THERA-VIT-M) tablet Take 1 tablet by mouth daily 30 tablet 11    mycophenolic acid (GENERIC EQUIVALENT) 180 MG EC tablet Take 1 tablet (180 mg) by mouth 2 times daily. 60 tablet 11    ondansetron (ZOFRAN-ODT) 4 MG ODT tab Take 1 tablet (4 mg) by mouth every 6 hours as needed for nausea 30 tablet 3    pantoprazole (PROTONIX) 40 MG EC tablet Take 1 tablet (40 mg) by mouth 2 times daily (before meals) 60 tablet 11    predniSONE (DELTASONE) 2.5 MG tablet Take 1 tablet (2.5 mg) by mouth every evening Total dose: 5mg in AM and 2.5 mg in PM 30 tablet 11    predniSONE (DELTASONE) 5 MG tablet Take 1 tablet (5 mg) by mouth every morning AND 0.5 tablets (2.5 mg) every evening. 135 tablet 3    propranolol ER (INDERAL LA) 60 MG 24 hr capsule Take 60 mg by mouth daily.      rOPINIRole (REQUIP) 0.25 MG tablet Take 0.25 mg by mouth At Bedtime      rosuvastatin (CRESTOR) 10 MG tablet Take 1 tablet (10 mg) by mouth daily 30 tablet 11    senna-docusate (SENOKOT-S/PERICOLACE) 8.6-50 MG tablet Take 2 tablets by mouth 2 times daily as needed for constipation (Patient not taking: Reported on 10/15/2024) 120 tablet 11    sildenafil (VIAGRA) 100 MG tablet 100 mg      sulfamethoxazole-trimethoprim (BACTRIM) 400-80 MG tablet Take 1 tablet by mouth daily 30 tablet 11    tacrolimus (GENERIC EQUIVALENT) 0.5 MG capsule Take 1 capsule (0.5 mg) by mouth 2 times daily. Total dose: 1.5 mg in AM and 1.5 mg in PM 60 capsule 11    tacrolimus (GENERIC EQUIVALENT) 1 MG capsule Total dose: 1.5 mg in AM and 1.5 mg in  capsule 3    warfarin ANTICOAGULANT (COUMADIN) 1 MG tablet Take 4 to 5  tablets daily or as directed by the Coumadin clinic 420 tablet 1    warfarin ANTICOAGULANT (COUMADIN) 1 MG tablet Take 4 tablets (4 mg) Sun, Wed, Fri; and 5 tablets (5 mg) all other days of the week or as directed by Anticoagulation Clinic. (Patient taking differently: Take 5 tablets (5 mg) Sun, Fri; and 4 tablets (4 mg) all other days of the week or as directed by Anticoagulation Clinic.) 385 tablet 1    zinc sulfate (ZINCATE) 220 (50 Zn) MG capsule Take 1 capsule (220 mg) by mouth daily. 90 capsule 3         Rashaad Al MD

## 2025-01-20 NOTE — PROGRESS NOTES
ANTICOAGULATION MANAGEMENT     Edson Thornton Jr. 59 year old male is on warfarin with supratherapeutic INR result. (Goal INR 2.0-3.0)    Recent labs: (last 7 days)     01/20/25  0000   INR 9.7       ASSESSMENT     Source(s): Chart Review and Patient/Caregiver Call     Warfarin doses taken: Warfarin taken as instructed  Diet:  Lack of appetite and not drinking enough fluids  Medication/supplement changes:  Patient was prescribed prednisone burst.  Patient is on day 3 of 5  New illness, injury, or hospitalization: Yes: Patient is getting over influenza. Patient reports feeling better than last week  Signs or symptoms of bleeding or clotting: No  Previous result: Therapeutic last 2(+) visits  Additional findings:  Patient understands that he will take one quarter of 5mg vitamin K tablet       PLAN     Recommended plan for ongoing change(s) affecting INR     Dosing Instructions: hold dose then continue your current warfarin dose with next INR in 1 day       Summary  As of 1/20/2025      Full warfarin instructions:  1/20: Hold; 1/21: Hold; Otherwise 5 mg every Sun, Thu; 4 mg all other days   Next INR check:  1/21/2025               Telephone call with Bret who verbalizes understanding and agrees to plan and who agrees to plan and repeated back plan correctly    Patient using outside facility for labs    Education provided: Taking warfarin: Importance of taking warfarin as instructed  Goal range and lab monitoring: goal range and significance of current result, Importance of therapeutic range, and Importance of following up at instructed interval  Symptom monitoring: monitoring for bleeding signs and symptoms, when to seek medical attention/emergency care, and if you hit your head or have a bad fall seek emergency care    Plan made with [facility] Pharmacist Antonella Cedillo RN  1/20/2025  Anticoagulation Clinic  [employer] for routing messages: andrew ROSALES [facility] patient phone line:  [phone]        _______________________________________________________________________     Anticoagulation Episode Summary       Current INR goal:  2.0-3.0   TTR:  62.7% (1 y)   Target end date:  Indefinite   Send INR reminders to:  [facility]    Indications    Atrial fibrillation  unspecified type (H) [I48.91]  Encounter for long-term (current) use of high-risk medication [Z79.899]  Immunosuppressed status [D84.9]  S/P lung transplant (H) [Z94.2]  Ischemic stroke (H) [I63.9]             Comments:  --             Anticoagulation Care Providers       Provider Role Specialty Phone number    Abiodun Woods MD Referring Pulmonary Disease [phone]

## 2025-01-21 ENCOUNTER — ANTICOAGULATION THERAPY VISIT (OUTPATIENT)
Dept: ANTICOAGULATION | Facility: CLINIC | Age: 60
End: 2025-01-21
Payer: COMMERCIAL

## 2025-01-21 ENCOUNTER — TELEPHONE (OUTPATIENT)
Dept: TRANSPLANT | Facility: CLINIC | Age: 60
End: 2025-01-21
Payer: COMMERCIAL

## 2025-01-21 DIAGNOSIS — I63.9 ISCHEMIC STROKE (H): ICD-10-CM

## 2025-01-21 DIAGNOSIS — Z79.899 ENCOUNTER FOR LONG-TERM (CURRENT) USE OF HIGH-RISK MEDICATION: ICD-10-CM

## 2025-01-21 DIAGNOSIS — Z94.2 S/P LUNG TRANSPLANT (H): Chronic | ICD-10-CM

## 2025-01-21 DIAGNOSIS — I48.91 ATRIAL FIBRILLATION, UNSPECIFIED TYPE (H): Primary | ICD-10-CM

## 2025-01-21 DIAGNOSIS — D84.9 IMMUNOSUPPRESSED STATUS: ICD-10-CM

## 2025-01-21 DIAGNOSIS — R79.1 ELEVATED INR: Primary | ICD-10-CM

## 2025-01-21 DIAGNOSIS — Z94.2 S/P LUNG TRANSPLANT (H): ICD-10-CM

## 2025-01-21 LAB — INR (EXTERNAL): 6.1 (ref 0.9–1.1)

## 2025-01-21 RX ORDER — PHYTONADIONE 5 MG/1
5 TABLET ORAL ONCE
Qty: 1 TABLET | Refills: 0 | Status: SHIPPED | OUTPATIENT
Start: 2025-01-21 | End: 2025-01-21

## 2025-01-21 NOTE — PROGRESS NOTES
ANTICOAGULATION MANAGEMENT     Edson Thornton Jr. 59 year old male is on warfarin with supratherapeutic INR result. (Goal INR 2.0-3.0)    Recent labs: (last 7 days)     01/21/25  1634   INR 6.1*       ASSESSMENT     Source(s): Chart Review and Patient/Caregiver Call     Warfarin doses taken: Held for supratherapeutic INR  recently which may be affecting INR and patient took 1.25mg Vitamin K on 1/20/25  Diet:  Poor appetite may be affecting diet and INR  Medication/supplement changes:  Patient on daily Bactrim (long-term).  On day 4 of 5 course of Prednisone  New illness, injury, or hospitalization: Yes: Influenza  Signs or symptoms of bleeding or clotting: No  Previous result: Supratherapeutic  Additional findings:  Consult with Antonella Ren [facility]. Medication and health changes.  Patient took Vitamin K as instructed.  He will Hold his Warfarin x2 doses and test an INR on Thurs.   Patient denies any signs or symptoms of bleeding.  Reviewed with patient when to seek medical attention.       PLAN     Recommended plan for temporary change(s) affecting INR     Dosing Instructions: hold 2 doses then continue your current warfarin dose with next INR in 2 days       Summary  As of 1/21/2025      Full warfarin instructions:  1/21: Hold; 1/22: Hold; Otherwise 5 mg every Sun, Thu; 4 mg all other days   Next INR check:  1/23/2025               Telephone call with Bret who verbalizes understanding and agrees to plan  Sent Self-A-r-T message with dosing and follow up instructions    Patient using outside facility for labs    Education provided: Please call back if any changes to your diet, medications or how you've been taking warfarin  Taking warfarin: purpose of warfarin and how it works, take warfarin at same time each day; preferably in the evening, prescribed tablet strength and color, and Importance of taking warfarin as instructed  Goal range and lab monitoring: goal range and significance of current result, Importance of  therapeutic range, and Importance of following up at instructed interval  Interaction IS anticipated between warfarin and Prednisone & Vitamin K  Symptom monitoring: monitoring for bleeding signs and symptoms, monitoring for clotting signs and symptoms, monitoring for stroke signs and symptoms, when to seek medical attention/emergency care, and if you hit your head or have a bad fall seek emergency care  Importance of notifying anticoagulation clinic for: changes in medications; a sooner lab recheck maybe needed, diarrhea, nausea/vomiting, reduced intake, cold/flu, and/or infections; a sooner lab recheck maybe needed, and if you did not receive dosing instructions on the same day as your labs were checked    Plan made with [facility] Pharmacist Nick Hobbs RN  1/21/2025  Anticoagulation Clinic  [employer] for routing messages: p [facility] patient phone line: [phone]        _______________________________________________________________________     Anticoagulation Episode Summary       Current INR goal:  2.0-3.0   TTR:  62.3% (1 y)   Target end date:  Indefinite   Send INR reminders to:  [facility]    Indications    Atrial fibrillation  unspecified type (H) [I48.91]  Encounter for long-term (current) use of high-risk medication [Z79.899]  Immunosuppressed status [D84.9]  S/P lung transplant (H) [Z94.2]  Ischemic stroke (H) [I63.9]             Comments:  --             Anticoagulation Care Providers       Provider Role Specialty Phone number    Abiodun Woods MD Referring Pulmonary Disease [phone]

## 2025-01-21 NOTE — PROGRESS NOTES
Small dose-1.25 mg vitamin K for INR near 10 and ongoing prednisone course    Antonella Ren, RPH

## 2025-01-21 NOTE — TELEPHONE ENCOUNTER
[facility] calls with INR of 6.3.     Patient had INR of 9.7 yesterday. Coumadin clinic prescribed Vit K 5mg x 1.     Called coumadin clinic, not able to get ahold of anyone.   Discussed with Dr. Woods - hold coumadin again tonight. Another dose of phytonadione 5mg prescribed.   Requested patient take dose tonight and get another INR level tomorrow.     Called patient, says talked to coumadin clinic.   Will hold warfarin today and tomorrow, level recheck on Thursday.     Patient also going to get rtc transplant labs on Thursday.   Will call back with questions, concerns, updates.

## 2025-01-23 ENCOUNTER — LAB (OUTPATIENT)
Dept: LAB | Facility: CLINIC | Age: 60
End: 2025-01-23
Payer: COMMERCIAL

## 2025-01-23 ENCOUNTER — ANTICOAGULATION THERAPY VISIT (OUTPATIENT)
Dept: ANTICOAGULATION | Facility: CLINIC | Age: 60
End: 2025-01-23
Payer: COMMERCIAL

## 2025-01-23 DIAGNOSIS — Z79.899 ENCOUNTER FOR LONG-TERM (CURRENT) USE OF HIGH-RISK MEDICATION: ICD-10-CM

## 2025-01-23 DIAGNOSIS — Z94.2 LUNG REPLACED BY TRANSPLANT (H): ICD-10-CM

## 2025-01-23 DIAGNOSIS — I48.91 ATRIAL FIBRILLATION, UNSPECIFIED TYPE (H): Primary | ICD-10-CM

## 2025-01-23 DIAGNOSIS — I63.9 ISCHEMIC STROKE (H): ICD-10-CM

## 2025-01-23 DIAGNOSIS — D84.9 IMMUNOSUPPRESSED STATUS: ICD-10-CM

## 2025-01-23 DIAGNOSIS — Z94.2 S/P LUNG TRANSPLANT (H): Chronic | ICD-10-CM

## 2025-01-23 LAB — INR (EXTERNAL): 3.3

## 2025-01-23 PROCEDURE — 80197 ASSAY OF TACROLIMUS: CPT

## 2025-01-23 NOTE — PROGRESS NOTES
ANTICOAGULATION MANAGEMENT     Edson Thornton Jr. 59 year old male is on warfarin with supratherapeutic INR result. (Goal INR 2.0-3.0)    Recent labs: (last 7 days)     01/23/25  0000   INR 3.3       ASSESSMENT     Source(s): Chart Review     Warfarin doses taken: Reviewed in chart  Diet:  Patient has been sick with flu symptoms.  Bret reported to writer on 1/20 that he was starting to feel better.  Medication/supplement changes:  Prednisone  burst should be done today and patient will resume typical prednisone dose  New illness, injury, or hospitalization: No Patient is getting over the flu  Signs or symptoms of bleeding or clotting: No  Previous result: Supratherapeutic  Additional findings: Patient is feeling better today with the exception of congestion     PLAN     Recommended plan for temporary change(s) affecting INR     Dosing Instructions: partial hold then continue your current warfarin dose with next INR in 4 days       Summary  As of 1/23/2025      Full warfarin instructions:  1/23: 2 mg; Otherwise 5 mg every Sun, Thu; 4 mg all other days   Next INR check:  1/27/2025               Telephone call with Bret who verbalizes understanding and agrees to plan and who agrees to plan and repeated back plan correctly    Patient using outside facility for labs    Education provided: Taking warfarin: Importance of taking warfarin as instructed  Goal range and lab monitoring: goal range and significance of current result, Importance of therapeutic range, and Importance of following up at instructed interval  Dietary considerations: impact of vitamin K foods on INR and vitamin K content of foods    Plan made with [facility] Pharmacist Antonella Cedillo RN  1/23/2025  Anticoagulation Clinic  [employer] for routing messages: andrew ROSALES [facility]  [facility] patient phone line: [phone]    Chart reviewed and plan made with [facility] RN at time of encounter.    Antonella Ren,  [facility]      _______________________________________________________________________     Anticoagulation Episode Summary       Current INR goal:  2.0-3.0   TTR:  61.9% (1 y)   Target end date:  Indefinite   Send INR reminders to:  [facility]    Indications    Atrial fibrillation  unspecified type (H) [I48.91]  Encounter for long-term (current) use of high-risk medication [Z79.899]  Immunosuppressed status [D84.9]  S/P lung transplant (H) [Z94.2]  Ischemic stroke (H) [I63.9]             Comments:  --             Anticoagulation Care Providers       Provider Role Specialty Phone number    Abiodun Woods MD Referring Pulmonary Disease [phone]

## 2025-01-24 ENCOUNTER — TELEPHONE (OUTPATIENT)
Dept: TRANSPLANT | Facility: CLINIC | Age: 60
End: 2025-01-24
Payer: COMMERCIAL

## 2025-01-24 DIAGNOSIS — Z94.2 S/P LUNG TRANSPLANT (H): ICD-10-CM

## 2025-01-24 NOTE — TELEPHONE ENCOUNTER
Writer called patient to check in     WBC 16.7  AST 64      Pt started on five day course of Tamiflu 1/10

## 2025-01-27 ENCOUNTER — ANTICOAGULATION THERAPY VISIT (OUTPATIENT)
Dept: ANTICOAGULATION | Facility: CLINIC | Age: 60
End: 2025-01-27
Payer: COMMERCIAL

## 2025-01-27 DIAGNOSIS — Z94.2 S/P LUNG TRANSPLANT (H): Chronic | ICD-10-CM

## 2025-01-27 DIAGNOSIS — D84.9 IMMUNOSUPPRESSED STATUS: ICD-10-CM

## 2025-01-27 DIAGNOSIS — I48.91 ATRIAL FIBRILLATION, UNSPECIFIED TYPE (H): Primary | ICD-10-CM

## 2025-01-27 DIAGNOSIS — I63.9 ISCHEMIC STROKE (H): ICD-10-CM

## 2025-01-27 DIAGNOSIS — Z79.899 ENCOUNTER FOR LONG-TERM (CURRENT) USE OF HIGH-RISK MEDICATION: ICD-10-CM

## 2025-01-27 LAB — INR (EXTERNAL): 3.5

## 2025-01-27 NOTE — PROGRESS NOTES
ANTICOAGULATION MANAGEMENT     Edson Thornton Jr. 59 year old male is on warfarin with supratherapeutic INR result. (Goal INR 2.0-3.0)    Recent labs: (last 7 days)     01/27/25  0000   INR 3.5       ASSESSMENT     Source(s): Chart Review and Patient/Caregiver Call     Warfarin doses taken: Warfarin taken as instructed  Diet: No new diet changes identified  Medication/supplement changes: None noted  New illness, injury, or hospitalization: Yes: Patient is feeling better but is still recovering from the flu.  Ongoing coughing and congestion  Signs or symptoms of bleeding or clotting: No  Previous result: Supratherapeutic  Additional findings: None       PLAN     Recommended plan for ongoing change(s) affecting INR     Dosing Instructions: hold dose then decrease your warfarin dose (10% change) with next INR in 1 week       Summary  As of 1/27/2025      Full warfarin instructions:  1/27: Hold; Otherwise 3 mg every Mon; 4 mg all other days   Next INR check:  2/3/2025               Telephone call with Bret who verbalizes understanding and agrees to plan and who agrees to plan and repeated back plan correctly    Patient using outside facility for labs    Education provided: Taking warfarin: Importance of taking warfarin as instructed  Goal range and lab monitoring: goal range and significance of current result, Importance of therapeutic range, and Importance of following up at instructed interval    Plan made with [facility] Pharmacist Antonella Cedillo RN  1/27/2025  Anticoagulation Clinic  [employer] for routing messages: p [facility] patient phone line: [phone]        _______________________________________________________________________     Anticoagulation Episode Summary       Current INR goal:  2.0-3.0   TTR:  60.8% (1 y)   Target end date:  Indefinite   Send INR reminders to:  [facility]    Indications    Atrial fibrillation  unspecified type (H) [I48.91]  Encounter for  long-term (current) use of high-risk medication [Z79.899]  Immunosuppressed status [D84.9]  S/P lung transplant (H) [Z94.2]  Ischemic stroke (H) [I63.9]             Comments:  --             Anticoagulation Care Providers       Provider Role Specialty Phone number    Abiodun Woods MD Referring Pulmonary Disease [phone]

## 2025-01-28 ENCOUNTER — TELEPHONE (OUTPATIENT)
Dept: ANTICOAGULATION | Facility: CLINIC | Age: 60
End: 2025-01-28
Payer: COMMERCIAL

## 2025-01-28 ENCOUNTER — TELEPHONE (OUTPATIENT)
Dept: TRANSPLANT | Facility: CLINIC | Age: 60
End: 2025-01-28
Payer: COMMERCIAL

## 2025-01-28 DIAGNOSIS — I63.9 ISCHEMIC STROKE (H): ICD-10-CM

## 2025-01-28 DIAGNOSIS — Z94.2 S/P LUNG TRANSPLANT (H): Chronic | ICD-10-CM

## 2025-01-28 DIAGNOSIS — I48.91 ATRIAL FIBRILLATION, UNSPECIFIED TYPE (H): Primary | ICD-10-CM

## 2025-01-28 DIAGNOSIS — Z94.2 S/P LUNG TRANSPLANT (H): ICD-10-CM

## 2025-01-28 DIAGNOSIS — Z79.899 ENCOUNTER FOR LONG-TERM (CURRENT) USE OF HIGH-RISK MEDICATION: ICD-10-CM

## 2025-01-28 DIAGNOSIS — D84.9 IMMUNOSUPPRESSED STATUS: ICD-10-CM

## 2025-01-28 DIAGNOSIS — Z94.2 LUNG REPLACED BY TRANSPLANT (H): ICD-10-CM

## 2025-01-28 DIAGNOSIS — R05.1 ACUTE COUGH: Primary | ICD-10-CM

## 2025-01-28 LAB
TACROLIMUS BLD-MCNC: 6.5 UG/L (ref 5–15)
TME LAST DOSE: NORMAL H
TME LAST DOSE: NORMAL H

## 2025-01-28 RX ORDER — LEVOFLOXACIN 750 MG/1
750 TABLET, FILM COATED ORAL DAILY
Qty: 10 TABLET | Refills: 0 | Status: SHIPPED | OUTPATIENT
Start: 2025-01-28 | End: 2025-02-07

## 2025-01-28 NOTE — TELEPHONE ENCOUNTER
ANTICOAGULATION  MANAGEMENT     Interacting Medication Review    Interacting medication(s): Levofloxacin (Levaquin) with warfarin.    Duration: 10 days  (1/29 to 1/7/25) Writer confirms with patient that he won't be starting this medication until tomorrow    Indication:  Respiratory illness    New medication?: Yes, interaction may increase INR and risk of bleeding. Closer INR monitoring recommended.        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

## 2025-01-28 NOTE — LETTER
PHYSICIAN ORDERS      DATE & TIME ISSUED: 2025 2:57 PM  PATIENT NAME: Edson Thornton Jr.   : 1965     [facility] MR# [if applicable]: [id number]     DIAGNOSIS:  Lung Transplant  Z94.2    Week of 2/3/25:  Please check CBC w/ diff, hepatic panel, and CMV PCR quant    Any questions please call: Ariela [phone]    Please fax these results to [phone].      .  Abiodun Woods MD  Division of Pulmonary, Allergy, Critical Care and Sleep Medicine  Professor of Medicine

## 2025-01-28 NOTE — LETTER
PHYSICIAN ORDERS      DATE & TIME ISSUED: 2025 6:04 AM  PATIENT NAME: Edson Thornton Jr.   : 1965     [facility] MR# [if applicable]: [id number]     DIAGNOSIS:  Lung Transplant  Z94.2    Week of 2/3/25:  Please draw BMP and tacrolimus level (note time of last dose), in addition to previous lab request to check CBC w/ diff, hepatic panel, and CMV PCR quant    Any questions please call: Ariela [phone]    Please fax these results to [phone].      .  Abiodun Woods MD  Division of Pulmonary, Allergy, Critical Care and Sleep Medicine  Professor of Medicine

## 2025-01-28 NOTE — TELEPHONE ENCOUNTER
Pt's WBC 16.3 on 1/27.  Notes continued congestion.  No fever or increase in shortness of breath.  Productive cough with yellow sputum.  No GI symptoms.      Discussed with Dr. Woods.  Plan for pt to start levaquin 750 mg daily x10 days.  Plan for labs next week, including CMV, CBC w/ diff, and repeat hepatic panel.  No plan to adjust medication at this time, but can consider holding rosuvastatin.

## 2025-01-29 ENCOUNTER — MYC MEDICAL ADVICE (OUTPATIENT)
Dept: TRANSPLANT | Facility: CLINIC | Age: 60
End: 2025-01-29
Payer: COMMERCIAL

## 2025-01-29 DIAGNOSIS — Z94.2 S/P LUNG TRANSPLANT (H): ICD-10-CM

## 2025-01-29 RX ORDER — TACROLIMUS 1 MG/1
CAPSULE ORAL
Qty: 270 CAPSULE | Refills: 3 | Status: SHIPPED | OUTPATIENT
Start: 2025-01-29

## 2025-01-29 RX ORDER — TACROLIMUS 0.5 MG/1
0.5 CAPSULE ORAL EVERY EVENING
Qty: 30 CAPSULE | Refills: 11 | Status: SHIPPED | OUTPATIENT
Start: 2025-01-29

## 2025-01-29 NOTE — TELEPHONE ENCOUNTER
Tacrolimus level 6.5 at 12.5 hours, on 1/23/25.  Goal 8-10.   Current dose 1.5 mg in AM, 1.5 mg in PM    Dose changed to 1.5 mg in AM, 2 mg in PM   Recheck level next Monday.    [employer] message sent

## 2025-01-30 ENCOUNTER — TELEPHONE (OUTPATIENT)
Dept: TRANSPLANT | Facility: CLINIC | Age: 60
End: 2025-01-30
Payer: COMMERCIAL

## 2025-01-30 DIAGNOSIS — Z79.899 ENCOUNTER FOR LONG-TERM (CURRENT) USE OF HIGH-RISK MEDICATION: ICD-10-CM

## 2025-01-30 DIAGNOSIS — R11.0 NAUSEA: ICD-10-CM

## 2025-01-30 DIAGNOSIS — Z94.2 S/P LUNG TRANSPLANT (H): ICD-10-CM

## 2025-01-30 DIAGNOSIS — D84.9 IMMUNOSUPPRESSED STATUS: ICD-10-CM

## 2025-01-30 DIAGNOSIS — Z94.2 LUNG REPLACED BY TRANSPLANT (H): ICD-10-CM

## 2025-01-30 RX ORDER — ONDANSETRON 4 MG/1
4 TABLET, ORALLY DISINTEGRATING ORAL EVERY 6 HOURS PRN
Qty: 30 TABLET | Refills: 3 | Status: SHIPPED | OUTPATIENT
Start: 2025-01-30

## 2025-01-30 RX ORDER — ROSUVASTATIN CALCIUM 10 MG/1
10 TABLET, COATED ORAL DAILY
COMMUNITY
Start: 2025-01-30

## 2025-01-30 NOTE — TELEPHONE ENCOUNTER
Patient Call: General  Route to LPN    Reason for call: Patient stated he's not feeling well, and wondering is there anything he should be doing. Patient didn't want to leave any details with this writer. Just requested for coordinator to call him back.    Call back needed? Yes    Return Call Needed  Same as documented in contacts section  When to return call?: Same day: Route High Priority

## 2025-01-30 NOTE — TELEPHONE ENCOUNTER
Patient calls stating he isn't feel great this morning.     Started taking levaquin yesterday. More fatigued, shaky, weak, nauseous this morning. Bowel movements normal. Respiratory symptoms remain stable- no change.    Reviewed with Dr. Woods:  -labs tomorrow  -pt to see PCP     Encouraged patient to try taking zofran. Pt understanding of plan

## 2025-01-30 NOTE — LETTER
PHYSICIAN ORDERS      DATE & TIME ISSUED: 2025 1:47 PM  PATIENT NAME: Edson Thornton Jr.   : 1965     [facility] MR# [if applicable]: [id number]     DIAGNOSIS:  Lung Transplant  Z94.2    Order to draw cbc with platelets, cmp, mag, and tacrolimus level.     Any questions please call: Alfred [phone]    Please fax these results to [phone].      .  Abiodun Woods MD  Division of Pulmonary, Allergy, Critical Care and Sleep Medicine  Professor of Medicine

## 2025-01-30 NOTE — TELEPHONE ENCOUNTER
Pt instructed to hold statin for rising LFTs. Repeat CMP in 2-3 weeks.     Pt understanding of plan

## 2025-01-31 ENCOUNTER — LAB (OUTPATIENT)
Dept: LAB | Facility: CLINIC | Age: 60
End: 2025-01-31
Payer: COMMERCIAL

## 2025-01-31 DIAGNOSIS — Z94.2 LUNG REPLACED BY TRANSPLANT (H): ICD-10-CM

## 2025-01-31 PROCEDURE — 80197 ASSAY OF TACROLIMUS: CPT

## 2025-02-03 LAB
TACROLIMUS BLD-MCNC: 9.2 UG/L (ref 5–15)
TME LAST DOSE: NORMAL H
TME LAST DOSE: NORMAL H

## 2025-02-04 NOTE — RESULT ENCOUNTER NOTE
Tacrolimus level 9.2 at 12 hours, on 1/31/25.  Goal 8-10.   Current dose 1.5 mg in AM, 2 mg in PM    Level at goal.  No dose chage.    [employer] message sent

## 2025-02-06 ENCOUNTER — TELEPHONE (OUTPATIENT)
Dept: ANTICOAGULATION | Facility: CLINIC | Age: 60
End: 2025-02-06
Payer: COMMERCIAL

## 2025-02-06 NOTE — TELEPHONE ENCOUNTER
ANTICOAGULATION     Edson Thornton Jr. is overdue for an INR check.     Spoke with Bret and reminded to scheduled appointment at local/outside lab as soon as possible    Terrie Cedillo, RN  2/6/2025  Anticoagulation Clinic  [facility] for routing messages: [facility]rew [facility]  [facility] patient phone line: [phone]

## 2025-02-11 ENCOUNTER — TELEPHONE (OUTPATIENT)
Dept: TRANSPLANT | Facility: CLINIC | Age: 60
End: 2025-02-11
Payer: COMMERCIAL

## 2025-02-11 DIAGNOSIS — Z94.2 S/P LUNG TRANSPLANT (H): ICD-10-CM

## 2025-02-12 ENCOUNTER — TELEPHONE (OUTPATIENT)
Dept: ANTICOAGULATION | Facility: CLINIC | Age: 60
End: 2025-02-12
Payer: COMMERCIAL

## 2025-02-12 DIAGNOSIS — D84.9 IMMUNOSUPPRESSED STATUS: ICD-10-CM

## 2025-02-12 DIAGNOSIS — Z79.899 ENCOUNTER FOR LONG-TERM (CURRENT) USE OF HIGH-RISK MEDICATION: ICD-10-CM

## 2025-02-12 DIAGNOSIS — I63.9 ISCHEMIC STROKE (H): ICD-10-CM

## 2025-02-12 DIAGNOSIS — Z94.2 S/P LUNG TRANSPLANT (H): Chronic | ICD-10-CM

## 2025-02-12 DIAGNOSIS — I48.91 ATRIAL FIBRILLATION, UNSPECIFIED TYPE (H): Primary | ICD-10-CM

## 2025-02-12 LAB
INR (EXTERNAL): 2.4
INR (EXTERNAL): 2.8
INR (EXTERNAL): 2.9
INR (EXTERNAL): 3.4
INR (EXTERNAL): 4.3

## 2025-02-12 NOTE — TELEPHONE ENCOUNTER
ANTICOAGULATION  MANAGEMENT: Discharge Review    Edson Thornton Jr. chart reviewed for anticoagulation continuity of care    Hospital Admission on 2/8/25 - 2/12/25 forv Community acquired pneumonia; Covid +; norovirus +   COPD exacerbation.  (Had influenza A in January)   Nausea/vomiting/diarrhea/fever/weakness/shortness of breath/wheezing.      He was admitted to [place], [place]    Discharge disposition: Home    Results:   2/8/25:  INR:  4.3                  2/9/25:  INR 3.4                  2/10/25:  INR 2.8                  2/11/25:  INR 2.4                  2/12/25:  INR:  2.9    Anticoagulation inpatient management:     anticoagulation calendar updated with inpatient dosing    On 2/8/25 he had home dose of warfarin:  3 mg    Anticoagulation discharge instructions:     Warfarin dosing: home regimen continued  (4 mg TuThSu; 3 mg ROW)   Bridging: No   INR goal change: No      Medication changes affecting anticoagulation: Yes:     Additional factors affecting anticoagulation: Yes, recovering from community acquired pneumonia, Covid, norovirus; also had Influenza A in January     PLAN     No adjustment to anticoagulation plan needed.  Per discharge summary, to recheck INR 2/18/25 [facility]    Karen with Bret.  He states he is feeling much better, has been eating.  He will check INR on 2/18/25.    Anticoagulation Calendar updated    Mesha Chau RN  2/12/2025  Anticoagulation Clinic  [employer] [place] for routing messages: [facility]  [facility] patient phone line: [phone]

## 2025-02-13 DIAGNOSIS — Z94.2 S/P LUNG TRANSPLANT (H): ICD-10-CM

## 2025-02-13 NOTE — TELEPHONE ENCOUNTER
"Pt called on Tuesday to discuss his admission to [facility] and current tacrolimus plan.  Verified that's pt's tacrolimus level was elevated in AM (12 hour level) and okay to lower dose to 1 mg BID.  Will need close follow up after discharge to confirm tacro level and dosing.    Per chart review from hospital note on 2/8: \"His last dose of Levaquin was yesterday after which he started noticing continued shortness of breath, wheezing, watery diarrhea, nausea and vomiting. He states to have felt extremely weak today and was only able to take his morning medications. Because of persistent symptoms he decided to seek medical attention and presented to the emergency room.    ER evaluation he is noted to be tachypneic, borderline hypoxic and afebrile however quite dyspneic. After 2 albuterol treatment his symptoms improved. He states to have continued to have cough and wheezing. WBC is 14.8, platelet of 108, hemoglobin of 14, chemistry panel without significant abnormality except for magnesium of 1.3, procalcitonin 0.40 and chest x-ray without significant acute cardiopulmonary process. Provider discussed his case with [facility] transplant team and received recommendations for IV Zosyn.\"    Instructed pt to call tx office when he discharges to further discuss follow up.   "

## 2025-02-16 NOTE — PATIENT INSTRUCTIONS
Patient Instructions  1. Continue to hydrate with 60-70 oz fluids daily.  2. Continue to exercise daily or most days of the week.  3. Follow up with your primary care provider for annual gender health maintenance procedures.  4. Follow up with colonoscopy schedule.  5. Follow up with annual dermatology visits.  6. It doesn't seem like the COVID vaccine is working well in lung transplant patients. A number of lung transplant patients have gotten sick with COVID even after receiving the vaccines. Based on our recent experience, it can be life-threatening to get COVID  even after being vaccinated. Please continue to act like you did not get the COVID vaccine - social distancing, wearing a mask, good hand hygiene, etc. If the people around you are vaccinated, it will help reduce the risk of you getting COVID. All members of your household should be vaccinated.  7. Confirm your metoprolol dose when you get home.  8. Okay to stop zinc supplement.  9. Nasal swab today.    Next transplant clinic appointment:  3 months with CXR, labs and PFTs  Next lab draw: pending tacrolimus level, otherwise every 6 weeks    ~~~~~~~~~~~~~~~~~~~~~~~~~    Thoracic Transplant Office phone [phone] (alt [phone]), fax [phone]    Office Hours 8:30 - 5:00     For after-hours urgent issues, please dial [phone] (alt [phone]) and ask the  to page the Thoracic Transplant Coordinator On-Call.   --------------------  To expedite your medication refill(s), please contact your pharmacy and have them fax a refill request to: [phone]    *Please allow 3 business days for routine medication refills.  *Please allow 5 business days for controlled substance medication refills.    **For Diabetic medications and supplies refill(s), please contact your pharmacy and have them contact your Endocrine team.  --------------------  For scheduling appointments call [phone] (alt [phone])  --------------------  Please Note: If  you are active on [employer], all future test results will be sent by [employer] message only, and will no longer be called to patient. You may also receive communication directly from your physician.

## 2025-02-16 NOTE — PROGRESS NOTES
"Transplant Coordinator Note    Reason for visit: Post lung transplant follow up visit   Coordinator: Present (Gisel in person)  Caregiver: Pt's significant other    Health concerns addressed today:  1. Respiratory: ongoing cough and shortness of breath since hospitalization - clear sputum  2. GI/: loose stool ~3x per day; no belly pain; improved since hospitalization  3. Recent hospitalization for lung infection (covid+ 2/8/25) - treated with steroids, antibiotics   4. Blood sugars - checking daily ~100-150 in AM    Annuals 2/18/25    Activity/rehab: up ad [place]  Oxygen needs: room air, BiPAP NOC (uses \"off and on\")  Pain management/RX: joint/bone pain (wrist and fingers), Some swelling. Using Volteran Gel.   Diabetic management: on lantus, occasional novolog  CMV status: D-/R-  EBV status: D+/R+  AC/asa: on coumadin, bridge to Lovenox for bronch (lower dose per CrCl d/t bleeding post bronch x 2)  PJP prophylactic: Bactrim     COVID:  COVID-19 infection (yes/no, date of most recent positive test): most recent positive test: 2/8 - not treated with remdesivir  Status/instructions given about COVID-19 vaccine:      Pt Education: medications (use/dose/side effects), how/when to call coordinator, frequency of labs, s/s of infection/rejection, call prior to starting any new medications, lab/vital sign book     Health Maintenance:   Last colonoscopy: 7/2020  Next colonoscopy due: 3-5 years, 7/2023-7/2025  Dermatology: sees dermatology annual locally - 11/2025  Vaccinations this visit:   Dexa: 2/18/25    Labs, CXR, PFTs reviewed with patient  Medication record reviewed and reconciled  Questions and concerns addressed    Patient Instructions  1. Continue to hydrate with 60-70 oz fluids daily.  2. Continue to exercise daily or most days of the week.  3. Follow up with your primary care provider for annual gender health maintenance procedures.  4. Follow up with colonoscopy schedule.  5. Follow up with annual dermatology " visits.  6. It doesn't seem like the COVID vaccine is working well in lung transplant patients. A number of lung transplant patients have gotten sick with COVID even after receiving the vaccines. Based on our recent experience, it can be life-threatening to get COVID  even after being vaccinated. Please continue to act like you did not get the COVID vaccine - social distancing, wearing a mask, good hand hygiene, etc. If the people around you are vaccinated, it will help reduce the risk of you getting COVID. All members of your household should be vaccinated.  7. Confirm your metoprolol dose when you get home.  8. Okay to stop zinc supplement.  9. Nasal swab today.    Next transplant clinic appointment:  3 months with CXR, labs and PFTs  Next lab draw: pending tacrolimus level, otherwise every 6 weeks    AVS printed at time of check out

## 2025-02-18 ENCOUNTER — ANCILLARY PROCEDURE (OUTPATIENT)
Dept: CT IMAGING | Facility: CLINIC | Age: 60
End: 2025-02-18
Attending: INTERNAL MEDICINE
Payer: COMMERCIAL

## 2025-02-18 ENCOUNTER — ANTICOAGULATION THERAPY VISIT (OUTPATIENT)
Dept: ANTICOAGULATION | Facility: CLINIC | Age: 60
End: 2025-02-18

## 2025-02-18 ENCOUNTER — TELEPHONE (OUTPATIENT)
Dept: TRANSPLANT | Facility: CLINIC | Age: 60
End: 2025-02-18

## 2025-02-18 ENCOUNTER — ANCILLARY PROCEDURE (OUTPATIENT)
Dept: GENERAL RADIOLOGY | Facility: CLINIC | Age: 60
End: 2025-02-18
Attending: INTERNAL MEDICINE
Payer: COMMERCIAL

## 2025-02-18 ENCOUNTER — OFFICE VISIT (OUTPATIENT)
Dept: TRANSPLANT | Facility: CLINIC | Age: 60
End: 2025-02-18
Attending: INTERNAL MEDICINE
Payer: COMMERCIAL

## 2025-02-18 ENCOUNTER — PRE VISIT (OUTPATIENT)
Dept: NEUROLOGY | Facility: CLINIC | Age: 60
End: 2025-02-18

## 2025-02-18 ENCOUNTER — MYC MEDICAL ADVICE (OUTPATIENT)
Dept: NEUROLOGY | Facility: CLINIC | Age: 60
End: 2025-02-18

## 2025-02-18 ENCOUNTER — VIRTUAL VISIT (OUTPATIENT)
Dept: PHARMACY | Facility: CLINIC | Age: 60
End: 2025-02-18
Attending: PSYCHIATRY & NEUROLOGY
Payer: MEDICARE

## 2025-02-18 ENCOUNTER — ANCILLARY PROCEDURE (OUTPATIENT)
Dept: BONE DENSITY | Facility: CLINIC | Age: 60
End: 2025-02-18
Attending: INTERNAL MEDICINE
Payer: COMMERCIAL

## 2025-02-18 ENCOUNTER — OFFICE VISIT (OUTPATIENT)
Dept: NEUROLOGY | Facility: CLINIC | Age: 60
End: 2025-02-18
Attending: INTERNAL MEDICINE
Payer: COMMERCIAL

## 2025-02-18 ENCOUNTER — LAB (OUTPATIENT)
Dept: LAB | Facility: CLINIC | Age: 60
End: 2025-02-18
Attending: INTERNAL MEDICINE
Payer: COMMERCIAL

## 2025-02-18 VITALS
DIASTOLIC BLOOD PRESSURE: 82 MMHG | HEART RATE: 60 BPM | SYSTOLIC BLOOD PRESSURE: 136 MMHG | HEIGHT: 64 IN | OXYGEN SATURATION: 95 % | TEMPERATURE: 98.1 F | BODY MASS INDEX: 26.29 KG/M2 | WEIGHT: 154 LBS

## 2025-02-18 VITALS
SYSTOLIC BLOOD PRESSURE: 126 MMHG | HEART RATE: 63 BPM | RESPIRATION RATE: 18 BRPM | DIASTOLIC BLOOD PRESSURE: 73 MMHG | OXYGEN SATURATION: 96 %

## 2025-02-18 DIAGNOSIS — Z94.2 LUNG REPLACED BY TRANSPLANT (H): ICD-10-CM

## 2025-02-18 DIAGNOSIS — Z94.2 S/P LUNG TRANSPLANT (H): Chronic | ICD-10-CM

## 2025-02-18 DIAGNOSIS — Z94.2 S/P LUNG TRANSPLANT (H): ICD-10-CM

## 2025-02-18 DIAGNOSIS — Z79.4 TYPE 2 DIABETES MELLITUS WITH HYPERGLYCEMIA, WITH LONG-TERM CURRENT USE OF INSULIN (H): ICD-10-CM

## 2025-02-18 DIAGNOSIS — I48.91 ATRIAL FIBRILLATION, UNSPECIFIED TYPE (H): Primary | ICD-10-CM

## 2025-02-18 DIAGNOSIS — R25.1 TREMOR: Primary | ICD-10-CM

## 2025-02-18 DIAGNOSIS — E11.65 TYPE 2 DIABETES MELLITUS WITH HYPERGLYCEMIA, WITH LONG-TERM CURRENT USE OF INSULIN (H): ICD-10-CM

## 2025-02-18 DIAGNOSIS — D84.9 IMMUNOSUPPRESSED STATUS: ICD-10-CM

## 2025-02-18 DIAGNOSIS — Z79.899 ENCOUNTER FOR LONG-TERM (CURRENT) USE OF HIGH-RISK MEDICATION: ICD-10-CM

## 2025-02-18 DIAGNOSIS — I63.9 ISCHEMIC STROKE (H): ICD-10-CM

## 2025-02-18 PROBLEM — J18.9 CAP (COMMUNITY ACQUIRED PNEUMONIA): Status: ACTIVE | Noted: 2025-02-08

## 2025-02-18 LAB
6 MIN WALK (FT): 1340 FT
6 MIN WALK (M): 408 M
ALBUMIN SERPL BCG-MCNC: 4 G/DL (ref 3.5–5.2)
ALP SERPL-CCNC: 73 U/L (ref 40–150)
ALT SERPL W P-5'-P-CCNC: 79 U/L (ref 0–70)
ANION GAP SERPL CALCULATED.3IONS-SCNC: 9 MMOL/L (ref 7–15)
AST SERPL W P-5'-P-CCNC: 44 U/L (ref 0–45)
BASOPHILS # BLD MANUAL: 0 10E3/UL (ref 0–0.2)
BASOPHILS NFR BLD MANUAL: 0 %
BILIRUB SERPL-MCNC: 0.3 MG/DL
BUN SERPL-MCNC: 14.9 MG/DL (ref 8–23)
C PNEUM DNA SPEC QL NAA+PROBE: NOT DETECTED
CALCIUM SERPL-MCNC: 9.6 MG/DL (ref 8.8–10.4)
CHLORIDE SERPL-SCNC: 104 MMOL/L (ref 98–107)
CHOLEST SERPL-MCNC: 145 MG/DL
CREAT SERPL-MCNC: 1.1 MG/DL (ref 0.67–1.17)
DLCOCOR-%PRED-PRE: 73 %
DLCOCOR-PRE: 16.9 ML/MIN/MMHG
DLCOUNC-%PRED-PRE: 71 %
DLCOUNC-PRE: 16.4 ML/MIN/MMHG
DLCOUNC-PRED: 23.07 ML/MIN/MMHG
EBV DNA SERPL NAA+PROBE-ACNC: NOT DETECTED IU/ML
EGFRCR SERPLBLD CKD-EPI 2021: 77 ML/MIN/1.73M2
EOSINOPHIL # BLD MANUAL: 0 10E3/UL (ref 0–0.7)
EOSINOPHIL NFR BLD MANUAL: 0 %
ERV-%PRED-PRE: 34 %
ERV-PRE: 0.44 L
ERV-PRED: 1.26 L
ERYTHROCYTE [DISTWIDTH] IN BLOOD BY AUTOMATED COUNT: 14.6 % (ref 10–15)
EST. AVERAGE GLUCOSE BLD GHB EST-MCNC: 117 MG/DL
EXPTIME-PRE: 11.57 SEC
FASTING STATUS PATIENT QL REPORTED: YES
FASTING STATUS PATIENT QL REPORTED: YES
FEF2575-%PRED-PRE: 20 %
FEF2575-PRE: 0.51 L/SEC
FEF2575-PRED: 2.45 L/SEC
FEFMAX-%PRED-PRE: 50 %
FEFMAX-PRE: 4.02 L/SEC
FEFMAX-PRED: 7.95 L/SEC
FEV1-%PRED-PRE: 46 %
FEV1-PRE: 1.27 L
FEV1FEV6-PRE: 60 %
FEV1FEV6-PRED: 79 %
FEV1FVC-PRE: 57 %
FEV1FVC-PRED: 79 %
FEV1SVC-PRE: 52 %
FEV1SVC-PRED: 71 %
FIFMAX-PRE: 4.75 L/SEC
FLUAV H1 2009 PAND RNA SPEC QL NAA+PROBE: NOT DETECTED
FLUAV H1 RNA SPEC QL NAA+PROBE: NOT DETECTED
FLUAV H3 RNA SPEC QL NAA+PROBE: NOT DETECTED
FLUAV RNA SPEC QL NAA+PROBE: NOT DETECTED
FLUBV RNA SPEC QL NAA+PROBE: NOT DETECTED
FRCPLETH-%PRED-PRE: 95 %
FRCPLETH-PRE: 2.78 L
FRCPLETH-PRED: 2.92 L
FVC-%PRED-PRE: 64 %
FVC-PRE: 2.21 L
FVC-PRED: 3.45 L
GLUCOSE SERPL-MCNC: 98 MG/DL (ref 70–99)
HADV DNA SPEC QL NAA+PROBE: NOT DETECTED
HBA1C MFR BLD: 5.7 %
HCO3 SERPL-SCNC: 31 MMOL/L (ref 22–29)
HCOV PNL SPEC NAA+PROBE: NOT DETECTED
HCT VFR BLD AUTO: 40.2 % (ref 40–53)
HDLC SERPL-MCNC: 41 MG/DL
HGB BLD-MCNC: 13.6 G/DL (ref 13.3–17.7)
HMPV RNA SPEC QL NAA+PROBE: NOT DETECTED
HPIV1 RNA SPEC QL NAA+PROBE: NOT DETECTED
HPIV2 RNA SPEC QL NAA+PROBE: NOT DETECTED
HPIV3 RNA SPEC QL NAA+PROBE: NOT DETECTED
HPIV4 RNA SPEC QL NAA+PROBE: NOT DETECTED
IC-%PRED-PRE: 80 %
IC-PRE: 2.01 L
IC-PRED: 2.52 L
INR PPP: 5.64 (ref 0.85–1.15)
LDLC SERPL CALC-MCNC: 33 MG/DL
LYMPHOCYTES # BLD MANUAL: 1.9 10E3/UL (ref 0.8–5.3)
LYMPHOCYTES NFR BLD MANUAL: 16 %
M PNEUMO DNA SPEC QL NAA+PROBE: NOT DETECTED
MAGNESIUM SERPL-MCNC: 1.5 MG/DL (ref 1.7–2.3)
MCH RBC QN AUTO: 30.2 PG (ref 26.5–33)
MCHC RBC AUTO-ENTMCNC: 33.8 G/DL (ref 31.5–36.5)
MCV RBC AUTO: 89 FL (ref 78–100)
METAMYELOCYTES # BLD MANUAL: 0.1 10E3/UL
METAMYELOCYTES NFR BLD MANUAL: 1 %
MONOCYTES # BLD MANUAL: 1 10E3/UL (ref 0–1.3)
MONOCYTES NFR BLD MANUAL: 9 %
MYELOCYTES # BLD MANUAL: 0.2 10E3/UL
MYELOCYTES NFR BLD MANUAL: 2 %
NEUTROPHILS # BLD MANUAL: 8.7 10E3/UL (ref 1.6–8.3)
NEUTROPHILS NFR BLD MANUAL: 72 %
NONHDLC SERPL-MCNC: 104 MG/DL
PHOSPHATE SERPL-MCNC: 3.8 MG/DL (ref 2.5–4.5)
PLAT MORPH BLD: ABNORMAL
PLATELET # BLD AUTO: 169 10E3/UL (ref 150–450)
POTASSIUM SERPL-SCNC: 3.5 MMOL/L (ref 3.4–5.3)
PROT SERPL-MCNC: 6.1 G/DL (ref 6.4–8.3)
PSA SERPL DL<=0.01 NG/ML-MCNC: 0.57 NG/ML (ref 0–3.5)
RBC # BLD AUTO: 4.5 10E6/UL (ref 4.4–5.9)
RBC MORPH BLD: ABNORMAL
RSV RNA SPEC QL NAA+PROBE: NOT DETECTED
RSV RNA SPEC QL NAA+PROBE: NOT DETECTED
RV+EV RNA SPEC QL NAA+PROBE: NOT DETECTED
RVPLETH-%PRED-PRE: 105 %
RVPLETH-PRE: 2.34 L
RVPLETH-PRED: 2.21 L
SODIUM SERPL-SCNC: 144 MMOL/L (ref 135–145)
T4 FREE SERPL-MCNC: 1.56 NG/DL (ref 0.9–1.7)
TACROLIMUS BLD-MCNC: 14.3 UG/L (ref 5–15)
TLCPLETH-%PRED-PRE: 81 %
TLCPLETH-PRE: 4.8 L
TLCPLETH-PRED: 5.91 L
TME LAST DOSE: NORMAL H
TME LAST DOSE: NORMAL H
TRIGL SERPL-MCNC: 356 MG/DL
TSH SERPL DL<=0.005 MIU/L-ACNC: 5.26 UIU/ML (ref 0.3–4.2)
VA-%PRED-PRE: 64 %
VA-PRE: 3.4 L
VC-%PRED-PRE: 63 %
VC-PRE: 2.45 L
VC-PRED: 3.85 L
VIT D+METAB SERPL-MCNC: 29 NG/ML (ref 20–50)
WBC # BLD AUTO: 11.9 10E3/UL (ref 4–11)
WBC # BLD AUTO: 11.9 10E3/UL (ref 4–11)

## 2025-02-18 PROCEDURE — 82525 ASSAY OF COPPER: CPT | Mod: 90 | Performed by: PATHOLOGY

## 2025-02-18 PROCEDURE — 80053 COMPREHEN METABOLIC PANEL: CPT | Performed by: PATHOLOGY

## 2025-02-18 PROCEDURE — 86832 HLA CLASS I HIGH DEFIN QUAL: CPT | Performed by: INTERNAL MEDICINE

## 2025-02-18 PROCEDURE — 80197 ASSAY OF TACROLIMUS: CPT | Performed by: INTERNAL MEDICINE

## 2025-02-18 PROCEDURE — 94150 VITAL CAPACITY TEST: CPT | Performed by: INTERNAL MEDICINE

## 2025-02-18 PROCEDURE — 99207 PR NO CHARGE LOS: CPT

## 2025-02-18 PROCEDURE — 80061 LIPID PANEL: CPT | Performed by: PATHOLOGY

## 2025-02-18 PROCEDURE — 85610 PROTHROMBIN TIME: CPT | Performed by: PATHOLOGY

## 2025-02-18 PROCEDURE — 99417 PROLNG OP E/M EACH 15 MIN: CPT | Performed by: INTERNAL MEDICINE

## 2025-02-18 PROCEDURE — 84439 ASSAY OF FREE THYROXINE: CPT | Performed by: PATHOLOGY

## 2025-02-18 PROCEDURE — 87799 DETECT AGENT NOS DNA QUANT: CPT | Performed by: INTERNAL MEDICINE

## 2025-02-18 PROCEDURE — 94375 RESPIRATORY FLOW VOLUME LOOP: CPT | Performed by: INTERNAL MEDICINE

## 2025-02-18 PROCEDURE — 99213 OFFICE O/P EST LOW 20 MIN: CPT | Performed by: INTERNAL MEDICINE

## 2025-02-18 PROCEDURE — 84403 ASSAY OF TOTAL TESTOSTERONE: CPT | Performed by: INTERNAL MEDICINE

## 2025-02-18 PROCEDURE — 71250 CT THORAX DX C-: CPT | Mod: GC | Performed by: RADIOLOGY

## 2025-02-18 PROCEDURE — 71046 X-RAY EXAM CHEST 2 VIEWS: CPT | Performed by: RADIOLOGY

## 2025-02-18 PROCEDURE — G0103 PSA SCREENING: HCPCS | Performed by: PATHOLOGY

## 2025-02-18 PROCEDURE — 85007 BL SMEAR W/DIFF WBC COUNT: CPT | Performed by: PATHOLOGY

## 2025-02-18 PROCEDURE — 94726 PLETHYSMOGRAPHY LUNG VOLUMES: CPT | Performed by: INTERNAL MEDICINE

## 2025-02-18 PROCEDURE — 94729 DIFFUSING CAPACITY: CPT | Performed by: INTERNAL MEDICINE

## 2025-02-18 PROCEDURE — 85027 COMPLETE CBC AUTOMATED: CPT | Performed by: PATHOLOGY

## 2025-02-18 PROCEDURE — 99215 OFFICE O/P EST HI 40 MIN: CPT | Mod: 25 | Performed by: INTERNAL MEDICINE

## 2025-02-18 PROCEDURE — 77085 DXA BONE DENSITY AXL VRT FX: CPT | Performed by: INTERNAL MEDICINE

## 2025-02-18 PROCEDURE — 86833 HLA CLASS II HIGH DEFIN QUAL: CPT | Performed by: INTERNAL MEDICINE

## 2025-02-18 PROCEDURE — 84443 ASSAY THYROID STIM HORMONE: CPT | Performed by: PATHOLOGY

## 2025-02-18 PROCEDURE — 82306 VITAMIN D 25 HYDROXY: CPT | Performed by: INTERNAL MEDICINE

## 2025-02-18 PROCEDURE — 99000 SPECIMEN HANDLING OFFICE-LAB: CPT | Performed by: PATHOLOGY

## 2025-02-18 PROCEDURE — 36415 COLL VENOUS BLD VENIPUNCTURE: CPT | Performed by: PATHOLOGY

## 2025-02-18 PROCEDURE — 83036 HEMOGLOBIN GLYCOSYLATED A1C: CPT | Performed by: INTERNAL MEDICINE

## 2025-02-18 PROCEDURE — 87633 RESP VIRUS 12-25 TARGETS: CPT | Performed by: INTERNAL MEDICINE

## 2025-02-18 PROCEDURE — 84100 ASSAY OF PHOSPHORUS: CPT | Performed by: PATHOLOGY

## 2025-02-18 PROCEDURE — 83735 ASSAY OF MAGNESIUM: CPT | Performed by: PATHOLOGY

## 2025-02-18 RX ORDER — IPRATROPIUM BROMIDE AND ALBUTEROL SULFATE 2.5; .5 MG/3ML; MG/3ML
1 SOLUTION RESPIRATORY (INHALATION) EVERY 6 HOURS PRN
Qty: 90 ML | Refills: 3 | Status: SHIPPED | OUTPATIENT
Start: 2025-02-18

## 2025-02-18 ASSESSMENT — PAIN SCALES - GENERAL
PAINLEVEL_OUTOF10: NO PAIN (0)
PAINLEVEL_OUTOF10: NO PAIN (0)

## 2025-02-18 NOTE — PROGRESS NOTES
Medication Therapy Management (MTM) Encounter    ASSESSMENT:                            Medication Adherence/Access: No issues identified.    Tremor:  Patient's tremor appears to be related to medications started after his transplant 3 years ago. Medications including tacrolimus and prednisone can cause tremors. After discussion with patient, pharmacy dicussed medication options with patient's pulmonologist, Dr. Woods. We will trial switching tacrolimus IR to a once daily long acting formulation to see if this improves tremor.       PLAN:                            Switch tacrolimus to tacrolimus XL (Astagraf XL) 1.5mg every day, new prescription sent by pulmonology team  Continue other medications are prescribed      Follow-up:  1-2 weeks via MyChart with [place] pharmacist     Appointments in Next Year      Jun 17, 2025 10:10 AM  (Arrive by 9:55 AM)  [facility]ray [facility] with [email or web address]  [facility] Xray [place] ([facility] [place] ) [phone]     Jun 17, 2025 10:30 AM  LAB with  LAB  [facility] Lab [place] ([facility] ) [phone]     Jun 17, 2025 11:00 AM  (Arrive by 10:45 AM)  Loops & DLCO with  PFL C  [facility] Pulmonary Function Testing [place] ([facility] ) [phone]     Jun 17, 2025 11:50 AM  (Arrive by 11:35 AM)  Return Lung Transplant with Abiodun Woods MD  [facility] Transplant Clinic ([facility] ) [phone]          SUBJECTIVE/OBJECTIVE:                          Bret Thornton is a 59 year old male seen for an initial visit. He was referred to me from Dr. Al.      Reason for visit: Establish care with [facility] pharmacist team.      Allergies/ADRs: Reviewed in chart  Past Medical History: Reviewed in chart  Tobacco: He reports that he has never smoked. He has quit using smokeless tobacco.  Alcohol: none      Medication  Adherence/Access: no issues reported. Patient's fiance sets up his pillbox and denies any missed doses of medication.     Tremor:   - Prior to speaking with pharmacy, patient had met with pulmonology and neurology. Due to multiple medication adjustments made throughout the day, patient was unable to state current dosages of all medications  - Tremor effects both hands and occurs when patient is at rest  - Started after lung transplant  - improved a few months after transplant but have started to worsen again    Today's Vitals: There were no vitals taken for this visit.  ----------------  Post Discharge Medication Reconciliation Status: discharge medications reconciled, continue medications without change.    I spent 45 minutes with this patient today. All changes were made via collaborative practice agreement with Dr. Al.     A summary of these recommendations was sent via [employer].    Jazz Horton, PharmD  PGY-2 Behavioral Health Pharmacy Resident  [facility] ([facility])    Telemedicine Visit Details  The patient's medications can be safely assessed via a telemedicine encounter.  Type of service:  Telephone visit  Originating Location (pt. Location): Home  {PROVIDER LOCATION On-site should be selected for visits conducted from your clinic location or adjoining [facility] hospital, academic office, or other nearby [facility] building. Off-site should be selected for all other provider locations, including home:[id number]}  Distant Location (provider location):  Off-site  Start Time: 3:00 PM  End Time:  3:45 PM     Medication Therapy Recommendations  No medication therapy recommendations to display

## 2025-02-18 NOTE — PATIENT INSTRUCTIONS
Your tremor is consistent with a side effect of the transplant medications you are on (tacrolimus and prednisone). Since these medications are necessary due to your history of lung transplant, it can be difficult to fully control this kind of tremor.     Propranolol can help decrease tremor. There is another medication that can treat tremor called topiramate.     Occupational therapy can be helpful for non-medication options to deal with tremor. There is software and adjustments that can be made to your computer settings that can sometimes make computer use easier for folks with tremor. Some people find that weighted utensils for eating or pens with a different  can also be helpful for tremor. There may be an occupational therapist closer to home that you could work with. We have placed a referral for our system - you may be able to make an appointment with OT on the same day as your next medical appointment here.     We can follow up as needed. Please reach out with any questions or concerns.

## 2025-02-18 NOTE — Clinical Note
Alex Barboza!  You are seeing Woody in a couple of minutes. 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 He's on propranolol and metoprolol. Metoprolol is managed by pulm here (Dr. Woods). We talked about optimizing propranolol (although pulse today was 63) vs trying topiramate.   Let me know if you have any questions!  Thanks,  Rema

## 2025-02-18 NOTE — LETTER
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 term (current) use of systemic steroids     ILD (interstitial lung disease) (H)     Rheumatoid lung disease (H)     Cerebrovascular accident (CVA) (H)     Essential hypertension     History of lung transplant (H)     Encounter for aftercare following lung transplant (H)     Lung transplant status (H)        No Known Allergies  Past Surgical History:   Procedure Laterality Date     BRONCHOSCOPY (RIGID OR FLEXIBLE), DIAGNOSTIC N/A 1/26/2022    Procedure: BRONCHOSCOPY, WITH BRONCHOALVEOLAR LAVAGE AND BIOPSY;  Surgeon: Perlman, David Morris, MD;  Location: [facility]     BRONCHOSCOPY (RIGID OR FLEXIBLE), DIAGNOSTIC N/A 4/19/2022    Procedure: BRONCHOSCOPY, WITH BRONCHOALVEOLAR LAVAGE AND BIOPSY;  Surgeon: Sherine Merrill MD;  Location: [facility]     BRONCHOSCOPY (RIGID OR FLEXIBLE), DIAGNOSTIC N/A 6/21/2022    Procedure: BRONCHOSCOPY, WITH BRONCHOALVEOLAR LAVAGE AND BIOPSY;  Surgeon: Aneesh Rubio MD;  Location: [facility]     BRONCHOSCOPY FLEXIBLE AND RIGID N/A 11/15/2023    Procedure: Surveillance Bronchoscopy with airway check;  Surgeon: Ron Daniels MD;   Location: [facility]     COLONOSCOPY W/ BIOPSIES AND POLYPECTOMY  07/21/2020     CV CORONARY ANGIOGRAM N/A 09/08/2021    Procedure: Coronary Angiogram with possible intervention;  Surgeon: Jovon Bullock MD;  Location:  [facility]     CV RIGHT HEART CATH MEASUREMENTS RECORDED N/A 09/08/2021    Procedure: Right Heart Cath;  Surgeon: Jovon Bullock MD;  Location:  [facility]     ESOPHAGOSCOPY, GASTROSCOPY, DUODENOSCOPY (EGD), COMBINED N/A 10/23/2021    Procedure: ESOPHAGOGASTRODUODENOSCOPY (EGD);  Surgeon: Miquel Pisano MD;  Location:  GI     ESOPHAGOSCOPY, GASTROSCOPY, DUODENOSCOPY (EGD), COMBINED N/A 11/02/2021    Procedure: ESOPHAGOGASTRODUODENOSCOPY (EGD);  Surgeon: Daniel Ortiz MD;  Location:  GI     ESOPHAGOSCOPY, GASTROSCOPY, DUODENOSCOPY (EGD), COMBINED N/A 11/5/2021    Procedure: ESOPHAGOGASTRODUODENOSCOPY (EGD);  Surgeon: Ronnell Hernandez MD;  [facility]     EXTRACTION(S) DENTAL N/A 09/22/2021    Procedure: EXTRACTION tooth #19;  Surgeon: Deepak Tobin DDS;  Location: [facility]     HERNIA REPAIR       IR CHEST TUBE PLACEMENT NON-TUNNELLED LEFT  11/03/2021     PICC TRIPLE LUMEN PLACEMENT Left 11/04/2021    5FR TL PICC. Right non occlusive thrombus subclavian vein.     REPLACE GASTROJEJUNOSTOMY TUBE, PERCUTANEOUS N/A 11/9/2021    Procedure: REPLACEMENT, GASTROJEJUNOSTOMY TUBE, PERCUTANEOUS;  Surgeon: Hernando Rodriguez MD;  Location: [facility]     right acl       TRACHEOSTOMY PERCUTANEOUS N/A 10/29/2021    Procedure: Percutaneous Tracheostomy,;  Surgeon: Celine Jenkins MD;  [facility]     TRANSPLANT LUNG RECIPIENT SINGLE X2 Bilateral 10/16/2021    Procedure: TRANSPLANT, LUNG, RECIPIENT, BILATERAL, Bronchoscopy, on-pump perfusion, bilateral clamshell sternotomy;  Surgeon: Yanick Corral MD;  Location: [facility]     XR ACROMIOCLAVICULAR JOINT BILATERAL       Past Medical History:   Diagnosis Date     BRENNAN (acute kidney injury)  10/17/2021     Anxiety      Aspergillus (H) 03/08/2023     Depression      HLD (hyperlipidemia)      HTN (hypertension)      Hypothyroidism      ILD (interstitial lung disease) (H)      Infection due to 2019 novel coronavirus 03/2023     Infection due to 2019 novel coronavirus 11/2023    Treated with remdesivir     Lung infection 07/12/2023     SALTY on CPAP      Oxygen dependent     BL 4L since ~6/2021     Primary hypertension 02/28/2022     Rheumatoid arthritis (H)     signs ~5/2020, dx 5/2021     S/P lung transplant (H) 10/16/2021     Shock liver 10/17/2021     Steroid-induced hyperglycemia      Traction bronchiectasis (H)      Tremor 01/08/2025     Social History     Socioeconomic History     Marital status: Single     Spouse name: Not on file     Number of children: Not on file     Years of education: Not on file     Highest education level: Not on file   Occupational History     Not on file   Tobacco Use     Smoking status: Never     Smokeless tobacco: Former     Tobacco comments:     Quit chewing over 20 years ago.   Vaping Use     Vaping status: Never Used   Substance and Sexual Activity     Alcohol use: Never     Drug use: Never     Sexual activity: Not on file   Other Topics Concern     Parent/sibling w/ CABG, MI or angioplasty before 65F 55M? Not Asked   Social History Narrative    Full time  for the [employer] starting May 24, 2023.     Social Drivers of Health     Financial Resource Strain: Low Risk  (9/20/2022)    Received from [facility], [facility]    Overall Financial Resource Strain (CARDIA)      Difficulty of Paying Living Expenses: Not very hard   Food Insecurity: No Food Insecurity (9/20/2022)    Received from [facility], [facility]    Hunger Vital Sign      Worried About Running Out of Food in the Last Year: Never true      Ran Out of Food in the Last Year: Never true   Transportation Needs: No  Transportation Needs (9/20/2022)    Received from [facility], [facility]    PRAPARE - Transportation      Lack of Transportation (Medical): No      Lack of Transportation (Non-Medical): No   Physical Activity: Insufficiently Active (9/20/2022)    Received from [facility]    Exercise Vital Sign      Days of Exercise per Week: 4 days      Minutes of Exercise per Session: 20 min   Stress: Stress Concern Present (8/25/2021)    Received from [facility]    [place] [employer] - Occupational Stress Questionnaire      Feeling of Stress : Rather much   Social Connections: Moderately Isolated (8/25/2021)    Received from [facility]    Social Connection and Isolation Panel [NHANES]      Frequency of Communication with Friends and Family: Twice a week      Frequency of Social Gatherings with Friends and Family: Never      Attends [employer] Services: Never      Active Member of Clubs or Organizations: Yes      Attends Club or Organization Meetings: Never      Marital Status: Living with partner   Interpersonal Safety: Not on file   Housing Stability: Low Risk  (8/25/2021)    Received from [facility], [facility]    Housing Stability Vital Sign      Unable to Pay for Housing in the Last Year: No      Number of Places Lived in the Last Year: 2      Unstable Housing in the Last Year: No       Drug and lactation database from the [place] [employer] Medicine:  http://toxnet.nlm.nih.gov/cgi-bin/sis/htmlgen?LACT      B/P: Data Unavailable, T: Data Unavailable, P: Data Unavailable, R: Data Unavailable 0 lbs 0 oz  There were no vitals taken for this visit., There is no height or weight on file to calculate BMI.  Medications and Vitals not listed above were documented in the cart  and reviewed by me.     Current Outpatient Medications   Medication Sig Dispense Refill     predniSONE (DELTASONE) 20 MG tablet        acetaminophen (TYLENOL) 325 MG tablet 3 tablets (975 mg) by Oral or Feeding Tube route every 8 hours as needed for mild pain 100 tablet 11     amLODIPine (NORVASC) 10 MG tablet Take 1 tablet (10 mg) by mouth at bedtime. 30 tablet 11     Calcium Carbonate-Vitamin D (CALCIUM 600 +D HIGH POTENCY) 600-10 MG-MCG TABS 1 tablet by Oral or Feeding Tube route daily 30 tablet 11     calcium carbonate-vitamin D (CALTRATE) 600-10 MG-MCG per tablet Take 1 tablet by mouth daily.       carvedilol ER (COREG CR) 20 MG 24 hr capsule Take 2 capsules (40 mg) by mouth daily 60 capsule 11     diclofenac (VOLTAREN) 1 % topical gel Apply 4 g topically 4 times daily Both hands 150 g 11     famotidine (PEPCID) 20 MG tablet Take 20 mg by mouth daily       ferrous sulfate (FEROSUL) 325 (65 Fe) MG tablet Take 1 tablet (325 mg) by mouth daily (with breakfast) 90 tablet 3     finasteride (PROSCAR) 5 MG tablet Take 5 mg by mouth daily       FLUoxetine (PROZAC) 20 MG capsule Take 2 capsules (40 mg) by mouth daily.       FLUoxetine (PROZAC) 40 MG capsule Take 40 mg by mouth every morning.       fluticasone-vilanterol (BREO ELLIPTA) 200-25 MCG/ACT inhaler Inhale 1 puff into the lungs daily. 28 each 11     hydroxychloroquine (PLAQUENIL) 200 MG tablet Take 1 tablet (200 mg) by mouth 2 times daily 180 tablet 3     insulin aspart (NOVOLOG PEN) 100 UNIT/ML pen Take 1-3 units TID if BS over 200. Max daily dose is 12 15 mL 11     insulin pen needle (32G X 4 MM) 32G X 4 MM miscellaneous Use 4 pen needles daily or as directed. 120 each 11     lamoTRIgine (LAMICTAL) 200 MG tablet Take 200 mg by mouth at bedtime.       lamoTRIgine (LAMICTAL) 25 MG tablet Take 250 mg by mouth daily.       levalbuterol (XOPENEX HFA) 45 MCG/ACT inhaler INHALE 2 PUFFS BY MOUTH EVERY 4 HOURS AS NEEDED FOR WHEEZING FOR SHORTNESS OF BREATH 15 g 0      levothyroxine (SYNTHROID/LEVOTHROID) 25 MCG tablet Take 1 tablet (25 mcg) by mouth daily 30 tablet 11     magnesium glycinate 100 MG CAPS capsule Take 4 capsules (400 mg) by mouth 3 times daily       metoprolol succinate ER (TOPROL XL) 25 MG 24 hr tablet Take 25 mg by mouth daily.       metoprolol succinate ER (TOPROL XL) 50 MG 24 hr tablet Take 1 tablet by mouth daily.       mirtazapine (REMERON) 7.5 MG tablet Take 7.5 mg by mouth At Bedtime       multivitamin w/minerals (THERA-VIT-M) tablet Take 1 tablet by mouth daily 30 tablet 11     mycophenolic acid (GENERIC EQUIVALENT) 180 MG EC tablet Take 1 tablet (180 mg) by mouth 2 times daily. 60 tablet 11     ondansetron (ZOFRAN-ODT) 4 MG ODT tab Take 1 tablet (4 mg) by mouth every 6 hours as needed for nausea 30 tablet 3     pantoprazole (PROTONIX) 40 MG EC tablet Take 1 tablet (40 mg) by mouth 2 times daily (before meals) 60 tablet 11     predniSONE (DELTASONE) 2.5 MG tablet Take 1 tablet (2.5 mg) by mouth every evening Total dose: 5mg in AM and 2.5 mg in PM 30 tablet 11     predniSONE (DELTASONE) 5 MG tablet Take 1 tablet (5 mg) by mouth every morning AND 0.5 tablets (2.5 mg) every evening. 135 tablet 3     propranolol ER (INDERAL LA) 60 MG 24 hr capsule Take 60 mg by mouth daily.       rOPINIRole (REQUIP) 0.25 MG tablet Take 0.25 mg by mouth At Bedtime       rosuvastatin (CRESTOR) 10 MG tablet Take 1 tablet (10 mg) by mouth daily 30 tablet 11     senna-docusate (SENOKOT-S/PERICOLACE) 8.6-50 MG tablet Take 2 tablets by mouth 2 times daily as needed for constipation (Patient not taking: Reported on 10/15/2024) 120 tablet 11     sildenafil (VIAGRA) 100 MG tablet 100 mg       sulfamethoxazole-trimethoprim (BACTRIM) 400-80 MG tablet Take 1 tablet by mouth daily 30 tablet 11     tacrolimus (GENERIC EQUIVALENT) 0.5 MG capsule Take 1 capsule (0.5 mg) by mouth 2 times daily. Total dose: 1.5 mg in AM and 1.5 mg in PM 60 capsule 11     tacrolimus (GENERIC EQUIVALENT) 1 MG  capsule Total dose: 1.5 mg in AM and 1.5 mg in  capsule 3     warfarin ANTICOAGULANT (COUMADIN) 1 MG tablet Take 4 to 5 tablets daily or as directed by the Coumadin clinic 420 tablet 1     warfarin ANTICOAGULANT (COUMADIN) 1 MG tablet Take 4 tablets (4 mg) Sun, Wed, Fri; and 5 tablets (5 mg) all other days of the week or as directed by Anticoagulation Clinic. (Patient taking differently: Take 5 tablets (5 mg) Sun, Fri; and 4 tablets (4 mg) all other days of the week or as directed by Anticoagulation Clinic.) 385 tablet 1     zinc sulfate (ZINCATE) 220 (50 Zn) MG capsule Take 1 capsule (220 mg) by mouth daily. 90 capsule 3         Rashaad Al MD      Again, thank you for allowing me to participate in the care of your patient.      Sincerely,    Rashaad Al MD

## 2025-02-18 NOTE — LETTER
"2/18/2025      Edson Thornton Jr.  [address]  [place] [address]      Dear Colleague,    Thank you for referring your patient, Edson Thornton Jr., to the [facility]. Please see a copy of my visit note below.    Transplant Coordinator Note    Reason for visit: Post lung transplant follow up visit   Coordinator: Present (Gisel in person)  Caregiver: Pt's significant other    Health concerns addressed today:  1. Respiratory: ongoing cough and shortness of breath since hospitalization - clear sputum  2. GI/: loose stool ~3x per day; no belly pain; improved since hospitalization  3. Recent hospitalization for lung infection (covid+ 2/8/25) - treated with steroids, antibiotics   4. Blood sugars - checking daily ~100-150 in AM    Annuals 2/18/25    Activity/rehab: up [facility]  Oxygen needs: room air, BiPAP NOC (uses \"off and on\")  Pain management/RX: joint/bone pain (wrist and fingers), Some swelling. Using Volteran Gel.   Diabetic management: on lantus, occasional novolog  CMV status: D-/R-  EBV status: D+/R+  AC/asa: on coumadin, bridge to Lovenox for bronch (lower dose per CrCl d/t bleeding post bronch x 2)  PJP prophylactic: Bactrim     COVID:  COVID-19 infection (yes/no, date of most recent positive test): most recent positive test: 2/8 - not treated with remdesivir  Status/instructions given about COVID-19 vaccine:      Pt Education: medications (use/dose/side effects), how/when to call coordinator, frequency of labs, s/s of infection/rejection, call prior to starting any new medications, lab/vital sign book     Health Maintenance:   Last colonoscopy: 7/2020  Next colonoscopy due: 3-5 years, 7/2023-7/2025  Dermatology: sees dermatology annual locally - 11/2025  Vaccinations this visit:   Dexa: 2/18/25    Labs, CXR, PFTs reviewed with patient  Medication record reviewed and reconciled  Questions and concerns addressed    Patient Instructions  1. Continue to hydrate with 60-70 oz " "fluids daily.  2. Continue to exercise daily or most days of the week.  3. Follow up with your primary care provider for annual gender health maintenance procedures.  4. Follow up with colonoscopy schedule.  5. Follow up with annual dermatology visits.  6. It doesn't seem like the COVID vaccine is working well in lung transplant patients. A number of lung transplant patients have gotten sick with COVID even after receiving the vaccines. Based on our recent experience, it can be life-threatening to get COVID  even after being vaccinated. Please continue to act like you did not get the COVID vaccine - social distancing, wearing a mask, good hand hygiene, etc. If the people around you are vaccinated, it will help reduce the risk of you getting COVID. All members of your household should be vaccinated.  7. Confirm your metoprolol dose when you get home.  8. Okay to stop zinc supplement.  9. Nasal swab today.    Next transplant clinic appointment:  3 months with CXR, labs and PFTs  Next lab draw: pending tacrolimus level, otherwise every 6 weeks    AVS printed at time of check out        Attending attestation: I, Abiodun Woods M.D., have seen and examined the patient with Dr. Rosette Bone MD. I have reviewed labs and radiographs. We have discussed the patient and I agree with the findings and plan of care as discussed below.  Since his last visit, the patient had influenza on 1/10, treated with Tamiflu.  Although symptomatically improved he was noted to have persistent wheeze, treated with a prednisone burst.  In late January he had increased dyspnea, cough and sputum, levofloxacin was initiated but symptoms progressed with malaise, nausea, vomiting, diarrhea, increased cough with brown sputum and low-grade fever.  He was admitted from 2/8-2/12 locally for \"asymptomatic\" COVID-19 and pneumonia treated with broad-spectrum IV antibiotics and discharged on levofloxacin (the patient reports he was discharged on 3 " antibiotics but the discharge summary only mentions levofloxacin).  Since discharge he has had gradual improvement but remains below baseline.  He reports persistent cough with small amount of sputum which is clear and difficult to expectorate.  Occasional dyspnea at rest, increased with activity with exercise tolerance below baseline.  No further fever, chills or night sweats.  No chest pain.  Nausea and vomiting has resolved.  Loose stool, improved from hospital admission but still loose 3 times per day.  No abdominal pain.  Blood sugars are running 130s-150s.  Exam: All mucosas moist without lesions.  Heart with normal S1-S2, regular rate and rhythm, no murmur rub or gallop.  Lungs with mildly diminished airflow and diffuse coarse wheezes, no crackles or rhonchi.  Abdomen soft and nontender.  Extremities with no edema.      Recent Results (from the past week)   6 minute walk test    Collection Time: 02/18/25 12:00 AM   Result Value Ref Range    6 min walk (FT) 1,340 1,326 ft    6 Min Walk (M) 408 404 m   Comprehensive metabolic panel    Collection Time: 02/18/25  7:57 AM   Result Value Ref Range    Sodium 144 135 - 145 mmol/L    Potassium 3.5 3.4 - 5.3 mmol/L    Carbon Dioxide (CO2) 31 (H) 22 - 29 mmol/L    Anion Gap 9 7 - 15 mmol/L    Urea Nitrogen 14.9 8.0 - 23.0 mg/dL    Creatinine 1.10 0.67 - 1.17 mg/dL    GFR Estimate 77 >60 mL/min/1.73m2    Calcium 9.6 8.8 - 10.4 mg/dL    Chloride 104 98 - 107 mmol/L    Glucose 98 70 - 99 mg/dL    Alkaline Phosphatase 73 40 - 150 U/L    AST 44 0 - 45 U/L    ALT 79 (H) 0 - 70 U/L    Protein Total 6.1 (L) 6.4 - 8.3 g/dL    Albumin 4.0 3.5 - 5.2 g/dL    Bilirubin Total 0.3 <=1.2 mg/dL    Patient Fasting > 8hrs? Yes    CBC with platelets    Collection Time: 02/18/25  7:57 AM   Result Value Ref Range    WBC Count 11.9 (H) 4.0 - 11.0 10e3/uL    RBC Count 4.50 4.40 - 5.90 10e6/uL    Hemoglobin 13.6 13.3 - 17.7 g/dL    Hematocrit 40.2 40.0 - 53.0 %    MCV 89 78 - 100 fL    MCH 30.2  26.5 - 33.0 pg    MCHC 33.8 31.5 - 36.5 g/dL    RDW 14.6 10.0 - 15.0 %    Platelet Count 169 150 - 450 10e3/uL   Juarez Barr Virus Quantitative PCR, Plasma    Collection Time: 02/18/25  7:57 AM    Specimen: Arm, Left; Blood   Result Value Ref Range    EBV DNA IU/mL Not Detected Not Detected IU/mL   Hemoglobin A1c    Collection Time: 02/18/25  7:57 AM   Result Value Ref Range    Estimated Average Glucose 117 (H) <117 mg/dL    Hemoglobin A1C 5.7 (H) <5.7 %   INR    Collection Time: 02/18/25  7:57 AM   Result Value Ref Range    INR 5.64 (HH) 0.85 - 1.15   Lipid Profile    Collection Time: 02/18/25  7:57 AM   Result Value Ref Range    Cholesterol 145 <200 mg/dL    Triglycerides 356 (H) <150 mg/dL    Direct Measure HDL 41 >=40 mg/dL    LDL Cholesterol Calculated 33 <100 mg/dL    Non HDL Cholesterol 104 <130 mg/dL    Patient Fasting > 8hrs? Yes    Magnesium    Collection Time: 02/18/25  7:57 AM   Result Value Ref Range    Magnesium 1.5 (L) 1.7 - 2.3 mg/dL   Phosphorus    Collection Time: 02/18/25  7:57 AM   Result Value Ref Range    Phosphorus 3.8 2.5 - 4.5 mg/dL   TSH with free T4 reflex    Collection Time: 02/18/25  7:57 AM   Result Value Ref Range    TSH 5.26 (H) 0.30 - 4.20 uIU/mL   Vitamin D Deficiency    Collection Time: 02/18/25  7:57 AM   Result Value Ref Range    Vitamin D, Total (25-Hydroxy) 29 20 - 50 ng/mL   Prostate Specific Antigen Screen Over 40 yrs old    Collection Time: 02/18/25  7:57 AM   Result Value Ref Range    Prostate Specific Antigen Screen 0.57 0.00 - 3.50 ng/mL   WBC and Differential    Collection Time: 02/18/25  7:57 AM   Result Value Ref Range    WBC Count 11.9 (H) 4.0 - 11.0 10e3/uL   Manual Differential    Collection Time: 02/18/25  7:57 AM   Result Value Ref Range    % Neutrophils 72 %    % Lymphocytes 16 %    % Monocytes 9 %    % Eosinophils 0 %    % Basophils 0 %    % Metamyelocytes 1 %    % Myelocytes 2 %    Absolute Neutrophils 8.7 (H) 1.6 - 8.3 10e3/uL    Absolute Lymphocytes 1.9 0.8  - 5.3 10e3/uL    Absolute Monocytes 1.0 0.0 - 1.3 10e3/uL    Absolute Eosinophils 0.0 0.0 - 0.7 10e3/uL    Absolute Basophils 0.0 0.0 - 0.2 10e3/uL    Absolute Metamyelocytes 0.1 (H) <=0.0 10e3/uL    Absolute Myelocytes 0.2 (H) <=0.0 10e3/uL    RBC Morphology Confirmed RBC Indices     Platelet Assessment  Automated Count Confirmed. Platelet morphology is normal.     Automated Count Confirmed. Platelet morphology is normal.   T4 free    Collection Time: 02/18/25  7:57 AM   Result Value Ref Range    Free T4 1.56 0.90 - 1.70 ng/dL   Tacrolimus by Tandem Mass Spectrometry    Collection Time: 02/18/25  7:57 AM   Result Value Ref Range    Tacrolimus by Tandem Mass Spectrometry 14.3 5.0 - 15.0 ug/L    Tacrolimus Last Dose Date      Tacrolimus Last Dose Time     General PFT Lab (Please always keep checked)    Collection Time: 02/18/25  8:22 AM   Result Value Ref Range    FVC-Pred 3.45 L    FVC-Pre 2.21 L    FVC-%Pred-Pre 64 %    FEV1-Pre 1.27 L    FEV1-%Pred-Pre 46 %    FEV1FVC-Pred 79 %    FEV1FVC-Pre 57 %    FEFMax-Pred 7.95 L/sec    FEFMax-Pre 4.02 L/sec    FEFMax-%Pred-Pre 50 %    FEF2575-Pred 2.45 L/sec    FEF2575-Pre 0.51 L/sec    YNB3387-%Pred-Pre 20 %    ExpTime-Pre 11.57 sec    FIFMax-Pre 4.75 L/sec    VC-Pred 3.85 L    VC-Pre 2.45 L    VC-%Pred-Pre 63 %    IC-Pred 2.52 L    IC-Pre 2.01 L    IC-%Pred-Pre 80 %    ERV-Pred 1.26 L    ERV-Pre 0.44 L    ERV-%Pred-Pre 34 %    FEV1FEV6-Pred 79 %    FEV1FEV6-Pre 60 %    FRCPleth-Pred 2.92 L    FRCPleth-Pre 2.78 L    FRCPleth-%Pred-Pre 95 %    RVPleth-Pred 2.21 L    RVPleth-Pre 2.34 L    RVPleth-%Pred-Pre 105 %    TLCPleth-Pred 5.91 L    TLCPleth-Pre 4.80 L    TLCPleth-%Pred-Pre 81 %    DLCOunc-Pred 23.07 ml/min/mmHg    DLCOunc-Pre 16.40 ml/min/mmHg    DLCOunc-%Pred-Pre 71 %    DLCOcor-Pre 16.90 ml/min/mmHg    DLCOcor-%Pred-Pre 73 %    VA-Pre 3.40 L    VA-%Pred-Pre 64 %    FEV1SVC-Pred 71 %    FEV1SVC-Pre 52 %   Respiratory Panel PCR    Collection Time: 02/18/25 12:56 PM     Specimen: Nasopharyngeal; Swab   Result Value Ref Range    Adenovirus Not Detected Not Detected    Coronavirus Not Detected Not Detected    Human Metapneumovirus Not Detected Not Detected    Human Rhin/Enterovirus Not Detected Not Detected    Influenza A Not Detected Not Detected    Influenza A, H1 Not Detected Not Detected    Influenza A 2009 H1N1 Not Detected Not Detected    Influenza A, H3 Not Detected Not Detected    Influenza B Not Detected Not Detected    Parainfluenza Virus 1 Not Detected Not Detected    Parainfluenza Virus 2 Not Detected Not Detected    Parainfluenza Virus 3 Not Detected Not Detected    Parainfluenza Virus 4 Not Detected Not Detected    Respiratory Syncytial Virus A Not Detected Not Detected    Respiratory Syncytial Virus B Not Detected Not Detected    Chlamydia Pneumoniae Not Detected Not Detected    Mycoplasma Pneumoniae Not Detected Not Detected         Results as noted above.    PFT Interpretation:  Moderate obstructive ventilatory defect.  Decreased from previous.  Below recent best.   Valid Maneuver  Residual volume and total lung capacity within normal limits.  Diffusing capacity mildly reduced.  Residual volume and total lung capacity increased compared with previous.  Diffusing capacity increased from previous.      On 6-minute walk test, the patient walked 1340 feet (lower limit of normal 1326 feet) oxygen saturation fell from 94 to 91%.  No change in walk distance compared to last year but oxygen saturation decreased from previous.      Assessment and plan: Edson Thornton Jr. is a 59-year-old, 3.3 years status post bilateral lung transplantation for interstitial lung disease secondary to rheumatoid arthritis.  Post transplant complications as described below.  Since his last visit, the patient had influenza in January then hospitalized for pneumonia, COVID-positive in February.  Fever and purulent sputum have resolved but the patient does have ongoing cough, clear secretions,  decreased exercise tolerance and new wheeze on exam.  Chest CT, reviewed by me with no significant parenchymal infiltrate.  Although the patient's white blood cell count remains mildly elevated, imaging does not suggest an ongoing infection.  Nasal swab for RT-PCR was negative.  Patient's symptoms likely represents persistent airway inflammation.  The patient will be treated with a prednisone burst and taper, 30 mg x 4 days, 20 mg x 4 days, 10 mg MAICOL x 4 days and back to baseline dose of 7.5 mg.  Patient has ongoing difficulty with tremor, tacrolimus XL was recommended by pharmacy, will begin 1.5 mg daily.  Continue goal of 8-10.  Continue reduced dose Myfortic, changed from CellCept due to diarrhea, reduced dose due to recurrent respiratory infections.  No DSA on 12/10/2020 for evaluation.  Prospera and DSA pending from today's visit.  No improvement in burning mouth close think so zinc will be discontinued.  Patient is scheduled for neurology consultation this afternoon for further evaluation of tremor.  The patient was encouraged to use BiPAP consistently.  Blood pressure adequately controlled with current metoprolol and amlodipine.  Reflux controlled with famotidine and pantoprazole.  Magnesium level is adequate, continue current replacement.  Rosuvastatin was held for elevated LFTs.  ALT remains mildly elevated, continue monitoring.  Cholesterol, HDL and LDL remain adequate despite holding rosuvastatin.  Triglycerides are elevated.    Follow-up in 3 months with labs, chest x-ray and PFTs.      Abiodun GERARD, have spent 55 minutes on the day of the visit to review the chart, interview and examine the patient, review labs and imaging, formulate a plan and submit orders. Time documented is excluding resident evaluation time and time spent for PFT interpretation.    The longitudinal plan of care for the diagnosis(es)/condition(s) as documented were addressed during this visit. Due to the added complexity in  care, I will continue to support Bret in the subsequent management and with ongoing continuity of care.    Abiodun Woods MD  Contact via [employer]    Note: Chart documentation done in part with Dragon Voice Recognition software. Although reviewed after completion, some word and grammatical errors may remain. Please consider this when interpreting information in this chart.        Reason for Visit  Edson Thornton Jr. is a 59 year old year old male who is being seen for RECHECK (LUNG POST RETURN TXP PULM - 3 (4) month follow up.)   Assessment and plan:   Edson Thornton is a 58 year old male with NSIP/ILD, bronchiectasis, moderate PH, RA, SALTY, chronic HSV infection, hypogammaglobulinemia, steroid-induced diabetes, hypothyroidism, PFO, HTN, HLD, duodenal anomaly, anxiety, and depression. Admitted on 9/5/21 from OSH for acute on chronic respiratory failure 2/2 ILD exacerbation now s/p BSLT on 10/16/21. Prolonged vent wean s/p trach and PEG/J tube.  Post-op course also complicated by encephalopathy and diffuse weakness, acute to subacute CVA, afib with RVR, BRENNAN, GI bleed, Candidemia/Candida empyema, and anxiety. Code called 11/2 for bradycardia/asystole, progressive hypotension, found to have significant GI bleed, EGD with adherent clot near PEG tube site that was clipped.  The patient had a positive crossmatch following transplant which was attributed to rituximab patient had received in June 2021 but subsequently has had intermittent positive DSA. His exercise tolerance and PFTs have not reached the level expected.    Pulmonary/lung transplant: Patient reports dyspnea with moderate exertion, similar to baseline. Oxygenation is adequate at 95%.  Chest x-ray and CT chest viewed by me. PFTs with moderate obstructive ventilatory defect, slightly decreased from prior which is not unexpected given recent illness (see below). Continue current immunosuppression. Changed from CellCept to Myfortic due to diarrhea, reduced dose  due to previous recurrent respiratory infections. Tacrolimus will be adjusted to maintain a level of 8-10.  Cell free DNA at the last visit was low at 0.24.  Wheezing, nasal congestion, cough: Patient recently hospitalized for acute hypoxic respiratory failure. Treated with antibiotics, steroids and duonebs. Following hospitalization, still having wheezing, nasal congestion and cough. No PNA visualized on CXR/CT scan. Oxygenation is adequate at 95%. Will send for respiratory panel. If negative, will consider increasing prednisone for brief period of time.   Positive crossmatch/DSA: The patient had a retrospective positive crossmatch following transplantation, attributed to rituximab received in June 2022.  The patient did have a positive DQB5 November 2021 through March 2023.  It had resolved for many months but reappeared in June 2024.  At that time his cell free DNA was quite low.  Repeat DSA and cell free DNA from today's visit is pending.  If DSA remains elevated, will consider increase in Myfortic dose.  Date DSA mfi Biopsy (date) Treatment   10/17/2021  none         10/23/2021  none         11/1/2021  none         11/15/2021  none         11/29/2021  DQ B5  2522       12/6/2021  DQ B5  1873       12/12/2021  DQ B5  1703       12/27/2021  DQ B5  3924       1/3/2022  DQ B5  3072       1/10/2022  DQ B5  4032       1/17/2022  DQB5  1849       2/3/2022 DQB5   2488       2/7/2022 DQB5  1874       2/10/2022 DQB5  1781       3/15/2022 DQB5  2254       3/21/2022 DQB5  2117       4/18/2022 DQB5   908       6/20/2022  DQB5  1100       6/29/2022  DQB5  1411       9/12/2022 DQB5  1681       11/14/2022 DQB5  891       12/20/2022  DQB5  1553       3/8/2023  DQB5  551       4/25/2023 DQB5 None       8/1/2023 DQB5 None       11/14/23 DQB5 None       2/13/2024 None         6/5/2024 DQ B5 1921        12/10/2024  DQ B5 None           CKD: History of stage II CKD.  Creatinine is stable.  He requires a therapeutic dose of  "tacrolimus but should avoid other nephrotoxins and maintain adequate hydration.  Prophylaxis: Continue Bactrim for PJP prophylaxis.   Hypogammaglobulinemia: IgG replaced November 2024. Ordered IgG level and will replace if low.   Rheumatoid arthritis: Follows with rheumatology.   Atrial fibrillation with RVR: The patient appears to be in sinus rhythm on exam today.  Continue metoprolol and warfarin.  Obstructive sleep apnea: Patient reports using his BiPAP inconsistently.  He was encouraged to be more consistent especially with ongoing hypertension (see below).  Hypertension: Blood pressure appears well-controlled with current amlodipine and metoprolol. The patient was encouraged to be more consistent with his BiPAP to see if treating his sleep apnea might improve his blood pressure.   Depression/anxiety: Working closely with his mental health providers. Continue Prozac and Lamictal.  Reflux: Well-controlled with current pantoprazole.  Steroid-induced diabetes: Hgb A1c of 5.7%. Glucoses well controlled at home. Continue correction scale insulin.  Hypomagnesemia: Mg of 1.5. Will hold off on supplementation for now. Recheck with annual studies.   Hypothyroidism: Continue Synthroid.  Elevated TSH with normal T4. Recheck with annual studies.   \"Burning mouth syndrome\": Evaluated by his dentist. Clonazepam and alpha lipoic acid were recommended.  I am reluctant to initiate clonazepam on a chronic basis. Trialed zinc replacement without improvement so will discontinue this.   Mild transaminitis: AST of 79. Benign abdominal exam. Will recheck with annual studies.  Bone health: Treated with Reclast. Defer to endocrinology.  Bone density scan shows slight improvement.   Elevated INR: Alerted anticoagulation clinic.   Multiple acute/subacute ischemic strokes: etiology likely embolic vs perioperative. MRI brain with infarcts noted in both cerebral hemispheres, left cerebellum, presumed associated with new Afib vs " "perioperative. Bilateral upper extremity paresis (R>L), suspicion for some cortical blindness. Continue warfarin, HTN and BS control and rosuvastatin.    Hx Aspergillus/fungal infection: The patient was treated with a course of voriconazole in 2023 for Aspergillus.  Follow-up bronchoscopy grew penicillium and Fusarium but no Aspergillus.  CT was not suggestive of active fungal infection so voriconazole was discontinued.   Healthcare maintenance: Annual studies performed today.   Follow up in 3 months with repeat PFTs.       Lung TX HPI  Transplants:  10/16/2021 (Lung), Postoperative day:  1221    The patient was seen and examined by Abiodun Woods MD     Patient was recently hospitalized for shortness of breath, diarrhea and vomiting. Needed about 1L of oxygen for one day during hospitalization. He was diagnosed with acute \"COPD\" exacerbation, acute hypoxic respiratory failure, bacterial PNA and Covid 19 infection. He was started on broad spectrum abx, steroids and nebs. Antibiotics were narrowed to levofloxacin and patient recently completed those.     Following hospitalization, the patient is feeling better but still having congestion and cough. Sometimes the cough is productive, sometimes not. Has some shortness of breath at rest since hospitalization, but usually it occurs more with activity. He has noticed wheezing still and is doing his nebulizer every 4-6 hours. No chills or fevers, but has felt cold. He does lots of walking at home and with work. He is able to make it to the gym once every couple of weeks.     ROS: No chest pain, night sweats, no nausea or vomiting, having loose stools 3x daily but no abdominal pain. No heart palpitations. No headaches or ear pain.     Current Outpatient Medications   Medication Sig Dispense Refill     acetaminophen (TYLENOL) 325 MG tablet 3 tablets (975 mg) by Oral or Feeding Tube route every 8 hours as needed for mild pain 100 tablet 11     amLODIPine (NORVASC) 10 " MG tablet Take 1 tablet (10 mg) by mouth at bedtime. 30 tablet 11     Calcium Carbonate-Vitamin D (CALCIUM 600 +D HIGH POTENCY) 600-10 MG-MCG TABS 1 tablet by Oral or Feeding Tube route daily 30 tablet 11     calcium carbonate-vitamin D (CALTRATE) 600-10 MG-MCG per tablet Take 1 tablet by mouth daily.       carvedilol ER (COREG CR) 20 MG 24 hr capsule Take 2 capsules (40 mg) by mouth daily 60 capsule 11     diclofenac (VOLTAREN) 1 % topical gel Apply 4 g topically 4 times daily Both hands 150 g 11     famotidine (PEPCID) 20 MG tablet Take 20 mg by mouth daily       ferrous sulfate (FEROSUL) 325 (65 Fe) MG tablet Take 1 tablet (325 mg) by mouth daily (with breakfast) 90 tablet 3     finasteride (PROSCAR) 5 MG tablet Take 5 mg by mouth daily       FLUoxetine (PROZAC) 20 MG capsule Take 2 capsules (40 mg) by mouth daily.       FLUoxetine (PROZAC) 40 MG capsule Take 40 mg by mouth every morning.       fluticasone-vilanterol (BREO ELLIPTA) 200-25 MCG/ACT inhaler Inhale 1 puff into the lungs daily. 28 each 11     hydroxychloroquine (PLAQUENIL) 200 MG tablet Take 1 tablet (200 mg) by mouth 2 times daily 180 tablet 3     insulin aspart (NOVOLOG PEN) 100 UNIT/ML pen Take 1-3 units TID if BS over 200. Max daily dose is 12 15 mL 11     insulin pen needle (32G X 4 MM) 32G X 4 MM miscellaneous Use 4 pen needles daily or as directed. 120 each 11     lamoTRIgine (LAMICTAL) 200 MG tablet Take 200 mg by mouth at bedtime.       lamoTRIgine (LAMICTAL) 25 MG tablet Take 250 mg by mouth daily.       levalbuterol (XOPENEX HFA) 45 MCG/ACT inhaler INHALE 2 PUFFS BY MOUTH EVERY 4 HOURS AS NEEDED FOR WHEEZING FOR SHORTNESS OF BREATH 15 g 0     levothyroxine (SYNTHROID/LEVOTHROID) 25 MCG tablet Take 1 tablet (25 mcg) by mouth daily 30 tablet 11     magnesium glycinate 100 MG CAPS capsule Take 4 capsules (400 mg) by mouth 3 times daily       metoprolol succinate ER (TOPROL XL) 25 MG 24 hr tablet Take 25 mg by mouth daily.       metoprolol  succinate ER (TOPROL XL) 50 MG 24 hr tablet Take 1 tablet by mouth daily.       mirtazapine (REMERON) 7.5 MG tablet Take 7.5 mg by mouth At Bedtime       multivitamin w/minerals (THERA-VIT-M) tablet Take 1 tablet by mouth daily 30 tablet 11     mycophenolic acid (GENERIC EQUIVALENT) 180 MG EC tablet Take 1 tablet (180 mg) by mouth 2 times daily. 60 tablet 11     ondansetron (ZOFRAN ODT) 4 MG ODT tab Take 1 tablet (4 mg) by mouth every 6 hours as needed for nausea. 30 tablet 3     pantoprazole (PROTONIX) 40 MG EC tablet Take 1 tablet (40 mg) by mouth 2 times daily (before meals) 60 tablet 11     predniSONE (DELTASONE) 2.5 MG tablet Take 1 tablet (2.5 mg) by mouth every evening Total dose: 5mg in AM and 2.5 mg in PM 30 tablet 11     predniSONE (DELTASONE) 20 MG tablet        predniSONE (DELTASONE) 5 MG tablet Take 1 tablet (5 mg) by mouth every morning AND 0.5 tablets (2.5 mg) every evening. 135 tablet 3     propranolol ER (INDERAL LA) 60 MG 24 hr capsule Take 60 mg by mouth daily.       rOPINIRole (REQUIP) 0.25 MG tablet Take 0.25 mg by mouth At Bedtime       rosuvastatin (CRESTOR) 10 MG tablet Take 1 tablet (10 mg) by mouth daily. On hold 1/30/25 for rising LFTs       senna-docusate (SENOKOT-S/PERICOLACE) 8.6-50 MG tablet Take 2 tablets by mouth 2 times daily as needed for constipation (Patient not taking: Reported on 10/15/2024) 120 tablet 11     sildenafil (VIAGRA) 100 MG tablet 100 mg       sulfamethoxazole-trimethoprim (BACTRIM) 400-80 MG tablet Take 1 tablet by mouth daily 30 tablet 11     tacrolimus (GENERIC EQUIVALENT) 0.5 MG capsule Take 1 capsule (0.5 mg) by mouth every evening. Total dose: 1.5 mg in AM and 2 mg in PM 30 capsule 11     tacrolimus (GENERIC EQUIVALENT) 1 MG capsule Take 1 capsule (1 mg) by mouth every morning AND 2 capsules (2 mg) every evening. Total dose: 1.5 mg in AM and 2 mg in PM. 270 capsule 3     warfarin ANTICOAGULANT (COUMADIN) 1 MG tablet Take 4 to 5 tablets daily or as directed by  the Coumadin clinic 420 tablet 1     warfarin ANTICOAGULANT (COUMADIN) 1 MG tablet Take 4 tablets (4 mg) Sun, Wed, Fri; and 5 tablets (5 mg) all other days of the week or as directed by Anticoagulation Clinic. (Patient taking differently: Take 5 tablets (5 mg) Sun, Fri; and 4 tablets (4 mg) all other days of the week or as directed by Anticoagulation Clinic.) 385 tablet 1     zinc sulfate (ZINCATE) 220 (50 Zn) MG capsule Take 1 capsule (220 mg) by mouth daily. 90 capsule 3     No current facility-administered medications for this visit.     No Known Allergies  Past Medical History:   Diagnosis Date     BRENNAN (acute kidney injury) 10/17/2021     Anxiety      Aspergillus (H) 03/08/2023     Depression      HLD (hyperlipidemia)      HTN (hypertension)      Hypothyroidism      ILD (interstitial lung disease) (H)      Infection due to 2019 novel coronavirus 03/2023     Infection due to 2019 novel coronavirus 11/2023    Treated with remdesivir     Lung infection 07/12/2023     SALTY on CPAP      Oxygen dependent     BL 4L since ~6/2021     Primary hypertension 02/28/2022     Rheumatoid arthritis (H)     signs ~5/2020, dx 5/2021     S/P lung transplant (H) 10/16/2021     Shock liver 10/17/2021     Steroid-induced hyperglycemia      Traction bronchiectasis (H)      Tremor 01/08/2025       Past Surgical History:   Procedure Laterality Date     BRONCHOSCOPY (RIGID OR FLEXIBLE), DIAGNOSTIC N/A 1/26/2022    Procedure: BRONCHOSCOPY, WITH BRONCHOALVEOLAR LAVAGE AND BIOPSY;  Surgeon: Perlman, David Morris, MD;  Location: [facility]     BRONCHOSCOPY (RIGID OR FLEXIBLE), DIAGNOSTIC N/A 4/19/2022    Procedure: BRONCHOSCOPY, WITH BRONCHOALVEOLAR LAVAGE AND BIOPSY;  Surgeon: Sherine Merrill MD;  Location: [facility]     BRONCHOSCOPY (RIGID OR FLEXIBLE), DIAGNOSTIC N/A 6/21/2022    Procedure: BRONCHOSCOPY, WITH BRONCHOALVEOLAR LAVAGE AND BIOPSY;  Surgeon: Aneesh Rubio MD;  Location: [facility]     BRONCHOSCOPY FLEXIBLE AND RIGID N/A 11/15/2023     Procedure: Surveillance Bronchoscopy with airway check;  Surgeon: Ron Daniels MD;  Location:  GI     COLONOSCOPY W/ BIOPSIES AND POLYPECTOMY  07/21/2020     CV CORONARY ANGIOGRAM N/A 09/08/2021    Procedure: Coronary Angiogram with possible intervention;  Surgeon: Jovon Bullock MD;  Location:  [facility] CATH LAB     CV RIGHT HEART CATH MEASUREMENTS RECORDED N/A 09/08/2021    Procedure: Right Heart Cath;  Surgeon: Jovon Bullock MD;  Location:  [facility]ATH [facility]     ESOPHAGOSCOPY, GASTROSCOPY, DUODENOSCOPY (EGD), COMBINED N/A 10/23/2021    Procedure: ESOPHAGOGASTRODUODENOSCOPY (EGD);  Surgeon: Miquel Pisano MD;  Location:  GI     ESOPHAGOSCOPY, GASTROSCOPY, DUODENOSCOPY (EGD), COMBINED N/A 11/02/2021    Procedure: ESOPHAGOGASTRODUODENOSCOPY (EGD);  Surgeon: Daniel Ortiz MD;  Location:  GI     ESOPHAGOSCOPY, GASTROSCOPY, DUODENOSCOPY (EGD), COMBINED N/A 11/5/2021    Procedure: ESOPHAGOGASTRODUODENOSCOPY (EGD);  Surgeon: Ronnell Hernandez MD;  Location:  GI     EXTRACTION(S) DENTAL N/A 09/22/2021    Procedure: EXTRACTION tooth #19;  Surgeon: Deepak Tobin DDS;  Location: [facility]     HERNIA REPAIR       IR CHEST TUBE PLACEMENT NON-TUNNELLED LEFT  11/03/2021     PICC TRIPLE LUMEN PLACEMENT Left 11/04/2021    5FR TL PICC. Right non occlusive thrombus subclavian vein.     REPLACE GASTROJEJUNOSTOMY TUBE, PERCUTANEOUS N/A 11/9/2021    Procedure: REPLACEMENT, GASTROJEJUNOSTOMY TUBE, PERCUTANEOUS;  Surgeon: Hernando Rodriguez MD;  [facility]     right acl       TRACHEOSTOMY PERCUTANEOUS N/A 10/29/2021    Procedure: Percutaneous Tracheostomy,;  Surgeon: Celine Jenkins MD;  Location: [facility]     TRANSPLANT LUNG RECIPIENT SINGLE X2 Bilateral 10/16/2021    Procedure: TRANSPLANT, LUNG, RECIPIENT, BILATERAL, Bronchoscopy, on-pump perfusion, bilateral clamshell sternotomy;  Surgeon: Yanick Corral MD;  Location: [facility]     XR ACROMIOCLAVICULAR  JOINT BILATERAL         Social History     Socioeconomic History     Marital status: Single     Spouse name: Not on file     Number of children: Not on file     Years of education: Not on file     Highest education level: Not on file   Occupational History     Not on file   Tobacco Use     Smoking status: Never     Smokeless tobacco: Former     Tobacco comments:     Quit chewing over 20 years ago.   Vaping Use     Vaping status: Never Used   Substance and Sexual Activity     Alcohol use: Never     Drug use: Never     Sexual activity: Not on file   Other Topics Concern     Parent/sibling w/ CABG, MI or angioplasty before 65F 55M? Not Asked   Social History Narrative    Full time  for the [employer] starting May 24, 2023.     Social Drivers of Health     Financial Resource Strain: Low Risk  (9/20/2022)    Received from [facility], [facility]    Overall Financial Resource Strain (CARDIA)      Difficulty of Paying Living Expenses: Not very hard   Food Insecurity: No Food Insecurity (2/8/2025)    Received from [facility]    Hunger Vital Sign      Worried About Running Out of Food in the Last Year: Never true      Ran Out of Food in the Last Year: Never true   Transportation Needs: No Transportation Needs (2/8/2025)    Received from [facility]    PRAPARE - Transportation      Lack of Transportation (Medical): No      Lack of Transportation (Non-Medical): No   Physical Activity: Insufficiently Active (9/20/2022)    Received from [facility], [facility]    Exercise Vital Sign      Days of Exercise per Week: 4 days      Minutes of Exercise per Session: 20 min   Stress: Stress Concern Present (8/25/2021)    Received from [facility], [facility]    [employer] [place] [employer] - Occupational Stress Questionnaire      Feeling of Stress : Rather much  "  Social Connections: Moderately Isolated (8/25/2021)    [facility] from [facility], [facility]    Social Connection and Isolation Panel [NHANES]      Frequency of Communication with Friends and Family: Twice a week      Frequency of Social Gatherings with Friends and Family: Never      Attends Spiritism Services: Never      Active Member of Clubs or Organizations: Yes      Attends Club or Organization Meetings: Never      Marital Status: Living with partner   Interpersonal Safety: Not on file   Housing Stability: Low Risk  (2/8/2025)    Received from [facility]    Housing Stability Vital Sign      Unable to Pay for Housing in the Last Year: No      Number of Times Moved in the Last Year: 0      Homeless in the Last Year: No     /82 (BP Location: Right arm, Patient Position: Sitting, Cuff Size: Adult Regular)   Pulse 60   Temp 98.1  F (36.7  C) (Oral)   Ht 1.626 m (5' 4\")   Wt 69.9 kg (154 lb)   SpO2 95%   BMI 26.43 kg/m     There is no height or weight on file to calculate BMI.  Exam:   GENERAL APPEARANCE: Well developed, well nourished, alert, and in no apparent distress.  EYES: PERRL, EOMI  HENT: Nasal mucosa with no edema and no hyperemia. No nasal polyps.  EARS: Canals clear, TMs normal  MOUTH: Oral mucosa is moist, without any lesions, no tonsillar enlargement, no oropharyngeal exudate.  NECK: supple, no masses, no thyromegaly.  LYMPHATICS: No significant axillary, cervical, or supraclavicular nodes.  RESP: Expiratory wheezing throughout along with crackles.   CV: Normal S1, S2, regular rhythm, normal rate. No murmur.  No rub. No gallop. No LE edema.   ABDOMEN:  Bowel sounds normal, soft, nontender, no HSM or masses.   MS: extremities normal. No clubbing. No cyanosis.  SKIN: no rash on limited exam  NEURO: Mentation intact, speech normal, normal strength and tone, normal gait and stance  PSYCH: mentation appears normal and affect " normal/bright  Results:  Recent Results (from the past week)   INR (External Result)    Collection Time: 02/12/25 12:00 AM   Result Value Ref Range    INR (External) 2.9    CBC with platelets    Collection Time: 02/18/25  7:57 AM   Result Value Ref Range    WBC Count 11.9 (H) 4.0 - 11.0 10e3/uL    RBC Count 4.50 4.40 - 5.90 10e6/uL    Hemoglobin 13.6 13.3 - 17.7 g/dL    Hematocrit 40.2 40.0 - 53.0 %    MCV 89 78 - 100 fL    MCH 30.2 26.5 - 33.0 pg    MCHC 33.8 31.5 - 36.5 g/dL    RDW 14.6 10.0 - 15.0 %    Platelet Count 169 150 - 450 10e3/uL     Results as noted above.    PFT Interpretation:  Moderate obstructive ventilatory defect.  Decreased from previous.  Below recent best.   Valid Maneuver      Again, thank you for allowing me to participate in the care of your patient.        Sincerely,        Abiodun Woods MD    Electronically signed

## 2025-02-18 NOTE — NURSING NOTE
"Chief Complaint   Patient presents with    RECHECK     Follow up.      Vitals:    02/18/25 1141   BP: 136/82   BP Location: Right arm   Patient Position: Sitting   Cuff Size: Adult Regular   Pulse: 60   Temp: 98.1  F (36.7  C)   TempSrc: Oral   SpO2: 95%   Weight: 69.9 kg (154 lb)   Height: 1.626 m (5' 4\")       BP Readings from Last 3 Encounters:   02/18/25 136/82   10/15/24 125/80   06/18/24 (!) 162/87       /82 (BP Location: Right arm, Patient Position: Sitting, Cuff Size: Adult Regular)   Pulse 60   Temp 98.1  F (36.7  C) (Oral)   Ht 1.626 m (5' 4\")   Wt 69.9 kg (154 lb)   SpO2 95%   BMI 26.43 kg/m       Sunshine Hehao    "

## 2025-02-18 NOTE — PROGRESS NOTES
"Attending attestation: I, Abiodun Woods M.D., have seen and examined the patient with Dr. Rosette Bone MD. I have reviewed labs and radiographs. We have discussed the patient and I agree with the findings and plan of care as discussed below.  Since his last visit, the patient had influenza on 1/10, treated with Tamiflu.  Although symptomatically improved he was noted to have persistent wheeze, treated with a prednisone burst.  In late January he had increased dyspnea, cough and sputum, levofloxacin was initiated but symptoms progressed with malaise, nausea, vomiting, diarrhea, increased cough with brown sputum and low-grade fever.  He was admitted from 2/8-2/12 locally for \"asymptomatic\" COVID-19 and pneumonia treated with broad-spectrum IV antibiotics and discharged on levofloxacin (the patient reports he was discharged on 3 antibiotics but the discharge summary only mentions levofloxacin).  Since discharge he has had gradual improvement but remains below baseline.  He reports persistent cough with small amount of sputum which is clear and difficult to expectorate.  Occasional dyspnea at rest, increased with activity with exercise tolerance below baseline.  No further fever, chills or night sweats.  No chest pain.  Nausea and vomiting has resolved.  Loose stool, improved from hospital admission but still loose 3 times per day.  No abdominal pain.  Blood sugars are running 130s-150s.  Exam: All mucosas moist without lesions.  Heart with normal S1-S2, regular rate and rhythm, no murmur rub or gallop.  Lungs with mildly diminished airflow and diffuse coarse wheezes, no crackles or rhonchi.  Abdomen soft and nontender.  Extremities with no edema.      Recent Results (from the past week)   6 minute walk test    Collection Time: 02/18/25 12:00 AM   Result Value Ref Range    6 min walk (FT) 1,340 1,326 ft    6 Min Walk (M) 408 404 m   Comprehensive metabolic panel    Collection Time: 02/18/25  7:57 AM   Result Value Ref " Range    Sodium 144 135 - 145 mmol/L    Potassium 3.5 3.4 - 5.3 mmol/L    Carbon Dioxide (CO2) 31 (H) 22 - 29 mmol/L    Anion Gap 9 7 - 15 mmol/L    Urea Nitrogen 14.9 8.0 - 23.0 mg/dL    Creatinine 1.10 0.67 - 1.17 mg/dL    GFR Estimate 77 >60 mL/min/1.73m2    Calcium 9.6 8.8 - 10.4 mg/dL    Chloride 104 98 - 107 mmol/L    Glucose 98 70 - 99 mg/dL    Alkaline Phosphatase 73 40 - 150 U/L    AST 44 0 - 45 U/L    ALT 79 (H) 0 - 70 U/L    Protein Total 6.1 (L) 6.4 - 8.3 g/dL    Albumin 4.0 3.5 - 5.2 g/dL    Bilirubin Total 0.3 <=1.2 mg/dL    Patient Fasting > 8hrs? Yes    CBC with platelets    Collection Time: 02/18/25  7:57 AM   Result Value Ref Range    WBC Count 11.9 (H) 4.0 - 11.0 10e3/uL    RBC Count 4.50 4.40 - 5.90 10e6/uL    Hemoglobin 13.6 13.3 - 17.7 g/dL    Hematocrit 40.2 40.0 - 53.0 %    MCV 89 78 - 100 fL    MCH 30.2 26.5 - 33.0 pg    MCHC 33.8 31.5 - 36.5 g/dL    RDW 14.6 10.0 - 15.0 %    Platelet Count 169 150 - 450 10e3/uL   Juarez Barr Virus Quantitative PCR, Plasma    Collection Time: 02/18/25  7:57 AM    Specimen: Arm, Left; Blood   Result Value Ref Range    EBV DNA IU/mL Not Detected Not Detected IU/mL   Hemoglobin A1c    Collection Time: 02/18/25  7:57 AM   Result Value Ref Range    Estimated Average Glucose 117 (H) <117 mg/dL    Hemoglobin A1C 5.7 (H) <5.7 %   INR    Collection Time: 02/18/25  7:57 AM   Result Value Ref Range    INR 5.64 (HH) 0.85 - 1.15   Lipid Profile    Collection Time: 02/18/25  7:57 AM   Result Value Ref Range    Cholesterol 145 <200 mg/dL    Triglycerides 356 (H) <150 mg/dL    Direct Measure HDL 41 >=40 mg/dL    LDL Cholesterol Calculated 33 <100 mg/dL    Non HDL Cholesterol 104 <130 mg/dL    Patient Fasting > 8hrs? Yes    Magnesium    Collection Time: 02/18/25  7:57 AM   Result Value Ref Range    Magnesium 1.5 (L) 1.7 - 2.3 mg/dL   Phosphorus    Collection Time: 02/18/25  7:57 AM   Result Value Ref Range    Phosphorus 3.8 2.5 - 4.5 mg/dL   TSH with free T4 reflex     Collection Time: 02/18/25  7:57 AM   Result Value Ref Range    TSH 5.26 (H) 0.30 - 4.20 uIU/mL   Vitamin D Deficiency    Collection Time: 02/18/25  7:57 AM   Result Value Ref Range    Vitamin D, Total (25-Hydroxy) 29 20 - 50 ng/mL   Prostate Specific Antigen Screen Over 40 yrs old    Collection Time: 02/18/25  7:57 AM   Result Value Ref Range    Prostate Specific Antigen Screen 0.57 0.00 - 3.50 ng/mL   WBC and Differential    Collection Time: 02/18/25  7:57 AM   Result Value Ref Range    WBC Count 11.9 (H) 4.0 - 11.0 10e3/uL   Manual Differential    Collection Time: 02/18/25  7:57 AM   Result Value Ref Range    % Neutrophils 72 %    % Lymphocytes 16 %    % Monocytes 9 %    % Eosinophils 0 %    % Basophils 0 %    % Metamyelocytes 1 %    % Myelocytes 2 %    Absolute Neutrophils 8.7 (H) 1.6 - 8.3 10e3/uL    Absolute Lymphocytes 1.9 0.8 - 5.3 10e3/uL    Absolute Monocytes 1.0 0.0 - 1.3 10e3/uL    Absolute Eosinophils 0.0 0.0 - 0.7 10e3/uL    Absolute Basophils 0.0 0.0 - 0.2 10e3/uL    Absolute Metamyelocytes 0.1 (H) <=0.0 10e3/uL    Absolute Myelocytes 0.2 (H) <=0.0 10e3/uL    RBC Morphology Confirmed RBC Indices     Platelet Assessment  Automated Count Confirmed. Platelet morphology is normal.     Automated Count Confirmed. Platelet morphology is normal.   T4 free    Collection Time: 02/18/25  7:57 AM   Result Value Ref Range    Free T4 1.56 0.90 - 1.70 ng/dL   Tacrolimus by Tandem Mass Spectrometry    Collection Time: 02/18/25  7:57 AM   Result Value Ref Range    Tacrolimus by Tandem Mass Spectrometry 14.3 5.0 - 15.0 ug/L    Tacrolimus Last Dose Date      Tacrolimus Last Dose Time     General PFT Lab (Please always keep checked)    Collection Time: 02/18/25  8:22 AM   Result Value Ref Range    FVC-Pred 3.45 L    FVC-Pre 2.21 L    FVC-%Pred-Pre 64 %    FEV1-Pre 1.27 L    FEV1-%Pred-Pre 46 %    FEV1FVC-Pred 79 %    FEV1FVC-Pre 57 %    FEFMax-Pred 7.95 L/sec    FEFMax-Pre 4.02 L/sec    FEFMax-%Pred-Pre 50 %     FEF2575-Pred 2.45 L/sec    FEF2575-Pre 0.51 L/sec    TGU2011-%Pred-Pre 20 %    ExpTime-Pre 11.57 sec    FIFMax-Pre 4.75 L/sec    VC-Pred 3.85 L    VC-Pre 2.45 L    VC-%Pred-Pre 63 %    IC-Pred 2.52 L    IC-Pre 2.01 L    IC-%Pred-Pre 80 %    ERV-Pred 1.26 L    ERV-Pre 0.44 L    ERV-%Pred-Pre 34 %    FEV1FEV6-Pred 79 %    FEV1FEV6-Pre 60 %    FRCPleth-Pred 2.92 L    FRCPleth-Pre 2.78 L    FRCPleth-%Pred-Pre 95 %    RVPleth-Pred 2.21 L    RVPleth-Pre 2.34 L    RVPleth-%Pred-Pre 105 %    TLCPleth-Pred 5.91 L    TLCPleth-Pre 4.80 L    TLCPleth-%Pred-Pre 81 %    DLCOunc-Pred 23.07 ml/min/mmHg    DLCOunc-Pre 16.40 ml/min/mmHg    DLCOunc-%Pred-Pre 71 %    DLCOcor-Pre 16.90 ml/min/mmHg    DLCOcor-%Pred-Pre 73 %    VA-Pre 3.40 L    VA-%Pred-Pre 64 %    FEV1SVC-Pred 71 %    FEV1SVC-Pre 52 %   Respiratory Panel PCR    Collection Time: 02/18/25 12:56 PM    Specimen: Nasopharyngeal; Swab   Result Value Ref Range    Adenovirus Not Detected Not Detected    Coronavirus Not Detected Not Detected    Human Metapneumovirus Not Detected Not Detected    Human Rhin/Enterovirus Not Detected Not Detected    Influenza A Not Detected Not Detected    Influenza A, H1 Not Detected Not Detected    Influenza A 2009 H1N1 Not Detected Not Detected    Influenza A, H3 Not Detected Not Detected    Influenza B Not Detected Not Detected    Parainfluenza Virus 1 Not Detected Not Detected    Parainfluenza Virus 2 Not Detected Not Detected    Parainfluenza Virus 3 Not Detected Not Detected    Parainfluenza Virus 4 Not Detected Not Detected    Respiratory Syncytial Virus A Not Detected Not Detected    Respiratory Syncytial Virus B Not Detected Not Detected    Chlamydia Pneumoniae Not Detected Not Detected    Mycoplasma Pneumoniae Not Detected Not Detected         Results as noted above.    PFT Interpretation:  Moderate obstructive ventilatory defect.  Decreased from previous.  Below recent best.   Valid Maneuver  Residual volume and total lung capacity within  normal limits.  Diffusing capacity mildly reduced.  Residual volume and total lung capacity increased compared with previous.  Diffusing capacity increased from previous.      On 6-minute walk test, the patient walked 1340 feet (lower limit of normal 1326 feet) oxygen saturation fell from 94 to 91%.  No change in walk distance compared to last year but oxygen saturation decreased from previous.      Assessment and plan: Edson Thornton Jr. is a 59-year-old, 3.3 years status post bilateral lung transplantation for interstitial lung disease secondary to rheumatoid arthritis.  Post transplant complications as described below.  Since his last visit, the patient had influenza in January then hospitalized for pneumonia, COVID-positive in February.  Fever and purulent sputum have resolved but the patient does have ongoing cough, clear secretions, decreased exercise tolerance and new wheeze on exam.  Chest CT, reviewed by me with no significant parenchymal infiltrate.  Although the patient's white blood cell count remains mildly elevated, imaging does not suggest an ongoing infection.  Nasal swab for RT-PCR was negative.  Patient's symptoms likely represents persistent airway inflammation.  The patient will be treated with a prednisone burst and taper, 30 mg x 4 days, 20 mg x 4 days, 10 mg MAICOL x 4 days and back to baseline dose of 7.5 mg.  Patient has ongoing difficulty with tremor, tacrolimus XL was recommended by pharmacy, will begin 1.5 mg daily.  Continue goal of 8-10.  Continue reduced dose Myfortic, changed from CellCept due to diarrhea, reduced dose due to recurrent respiratory infections.  No DSA on 12/10/2020 for evaluation.  Prospera and DSA pending from today's visit.  No improvement in burning mouth close think so zinc will be discontinued.  Patient is scheduled for neurology consultation this afternoon for further evaluation of tremor.  The patient was encouraged to use BiPAP consistently.  Blood pressure  adequately controlled with current metoprolol and amlodipine.  Reflux controlled with famotidine and pantoprazole.  Magnesium level is adequate, continue current replacement.  Rosuvastatin was held for elevated LFTs.  ALT remains mildly elevated, continue monitoring.  Cholesterol, HDL and LDL remain adequate despite holding rosuvastatin.  Triglycerides are elevated.    Follow-up in 3 months with labs, chest x-ray and PFTs.      I, Abiodun Woods, have spent 55 minutes on the day of the visit to review the chart, interview and examine the patient, review labs and imaging, formulate a plan and submit orders. Time documented is excluding resident evaluation time and time spent for PFT interpretation.    The longitudinal plan of care for the diagnosis(es)/condition(s) as documented were addressed during this visit. Due to the added complexity in care, I will continue to support Bret in the subsequent management and with ongoing continuity of care.    Abiodun Woods MD  Contact via Where Was it Filmed    Note: Chart documentation done in part with Dragon Voice Recognition software. Although reviewed after completion, some word and grammatical errors may remain. Please consider this when interpreting information in this chart.        Reason for Visit  Edson Thornton Jr. is a 59 year old year old male who is being seen for RECHECK (LUNG POST RETURN TXP PULM - 3 (4) month follow up.)   Assessment and plan:   Edson Thornton is a 58 year old male with NSIP/ILD, bronchiectasis, moderate PH, RA, SALTY, chronic HSV infection, hypogammaglobulinemia, steroid-induced diabetes, hypothyroidism, PFO, HTN, HLD, duodenal anomaly, anxiety, and depression. Admitted on 9/5/21 from OSH for acute on chronic respiratory failure 2/2 ILD exacerbation now s/p BSLT on 10/16/21. Prolonged vent wean s/p trach and PEG/J tube.  Post-op course also complicated by encephalopathy and diffuse weakness, acute to subacute CVA, afib with RVR, BRENNAN, GI bleed,  Candidemia/Candida empyema, and anxiety. Code called 11/2 for bradycardia/asystole, progressive hypotension, found to have significant GI bleed, EGD with adherent clot near PEG tube site that was clipped.  The patient had a positive crossmatch following transplant which was attributed to rituximab patient had received in June 2021 but subsequently has had intermittent positive DSA. His exercise tolerance and PFTs have not reached the level expected.    Pulmonary/lung transplant: Patient reports dyspnea with moderate exertion, similar to baseline. Oxygenation is adequate at 95%.  Chest x-ray and CT chest viewed by me. PFTs with moderate obstructive ventilatory defect, slightly decreased from prior which is not unexpected given recent illness (see below). Continue current immunosuppression. Changed from CellCept to Myfortic due to diarrhea, reduced dose due to previous recurrent respiratory infections. Tacrolimus will be adjusted to maintain a level of 8-10.  Cell free DNA at the last visit was low at 0.24.  Wheezing, nasal congestion, cough: Patient recently hospitalized for acute hypoxic respiratory failure. Treated with antibiotics, steroids and duonebs. Following hospitalization, still having wheezing, nasal congestion and cough. No PNA visualized on CXR/CT scan. Oxygenation is adequate at 95%. Will send for respiratory panel. If negative, will consider increasing prednisone for brief period of time.   Positive crossmatch/DSA: The patient had a retrospective positive crossmatch following transplantation, attributed to rituximab received in June 2022.  The patient did have a positive DQB5 November 2021 through March 2023.  It had resolved for many months but reappeared in June 2024.  At that time his cell free DNA was quite low.  Repeat DSA and cell free DNA from today's visit is pending.  If DSA remains elevated, will consider increase in Myfortic dose.  Date DSA mfi Biopsy (date) Treatment   10/17/2021  none          10/23/2021  none         11/1/2021  none         11/15/2021  none         11/29/2021  DQ B5  2522       12/6/2021  DQ B5  1873       12/12/2021  DQ B5  1703       12/27/2021  DQ B5  3924       1/3/2022  DQ B5  3072       1/10/2022  DQ B5  4032       1/17/2022  DQB5  1849       2/3/2022 DQB5   2488       2/7/2022 DQB5  1874       2/10/2022 DQB5  1781       3/15/2022 DQB5  2254       3/21/2022 DQB5  2117       4/18/2022 DQB5   908       6/20/2022  DQB5  1100       6/29/2022  DQB5  1411       9/12/2022 DQB5  1681       11/14/2022 DQB5  891       12/20/2022  DQB5  1553       3/8/2023  DQB5  551       4/25/2023 DQB5 None       8/1/2023 DQB5 None       11/14/23 DQB5 None       2/13/2024 None         6/5/2024 DQ B5 1921        12/10/2024  DQ B5 None           CKD: History of stage II CKD.  Creatinine is stable.  He requires a therapeutic dose of tacrolimus but should avoid other nephrotoxins and maintain adequate hydration.  Prophylaxis: Continue Bactrim for PJP prophylaxis.   Hypogammaglobulinemia: IgG replaced November 2024. Ordered IgG level and will replace if low.   Rheumatoid arthritis: Follows with rheumatology.   Atrial fibrillation with RVR: The patient appears to be in sinus rhythm on exam today.  Continue metoprolol and warfarin.  Obstructive sleep apnea: Patient reports using his BiPAP inconsistently.  He was encouraged to be more consistent especially with ongoing hypertension (see below).  Hypertension: Blood pressure appears well-controlled with current amlodipine and metoprolol. The patient was encouraged to be more consistent with his BiPAP to see if treating his sleep apnea might improve his blood pressure.   Depression/anxiety: Working closely with his mental health providers. Continue Prozac and Lamictal.  Reflux: Well-controlled with current pantoprazole.  Steroid-induced diabetes: Hgb A1c of 5.7%. Glucoses well controlled at home. Continue correction scale insulin.  Hypomagnesemia: Mg of 1.5. Will  "hold off on supplementation for now. Recheck with annual studies.   Hypothyroidism: Continue Synthroid.  Elevated TSH with normal T4. Recheck with annual studies.   \"Burning mouth syndrome\": Evaluated by his dentist. Clonazepam and alpha lipoic acid were recommended.  I am reluctant to initiate clonazepam on a chronic basis. Trialed zinc replacement without improvement so will discontinue this.   Mild transaminitis: AST of 79. Benign abdominal exam. Will recheck with annual studies.  Bone health: Treated with Reclast. Defer to endocrinology.  Bone density scan shows slight improvement.   Elevated INR: Alerted anticoagulation clinic.   Multiple acute/subacute ischemic strokes: etiology likely embolic vs perioperative. MRI brain with infarcts noted in both cerebral hemispheres, left cerebellum, presumed associated with new Afib vs perioperative. Bilateral upper extremity paresis (R>L), suspicion for some cortical blindness. Continue warfarin, HTN and BS control and rosuvastatin.    Hx Aspergillus/fungal infection: The patient was treated with a course of voriconazole in 2023 for Aspergillus.  Follow-up bronchoscopy grew penicillium and Fusarium but no Aspergillus.  CT was not suggestive of active fungal infection so voriconazole was discontinued.   Healthcare maintenance: Annual studies performed today.   Follow up in 3 months with repeat PFTs.       Lung TX HPI  Transplants:  10/16/2021 (Lung), Postoperative day:  1221    The patient was seen and examined by Abiodun Woods MD     Patient was recently hospitalized for shortness of breath, diarrhea and vomiting. Needed about 1L of oxygen for one day during hospitalization. He was diagnosed with acute \"COPD\" exacerbation, acute hypoxic respiratory failure, bacterial PNA and Covid 19 infection. He was started on broad spectrum abx, steroids and nebs. Antibiotics were narrowed to levofloxacin and patient recently completed those.     Following hospitalization, the " patient is feeling better but still having congestion and cough. Sometimes the cough is productive, sometimes not. Has some shortness of breath at rest since hospitalization, but usually it occurs more with activity. He has noticed wheezing still and is doing his nebulizer every 4-6 hours. No chills or fevers, but has felt cold. He does lots of walking at home and with work. He is able to make it to the gym once every couple of weeks.     ROS: No chest pain, night sweats, no nausea or vomiting, having loose stools 3x daily but no abdominal pain. No heart palpitations. No headaches or ear pain.     Current Outpatient Medications   Medication Sig Dispense Refill    acetaminophen (TYLENOL) 325 MG tablet 3 tablets (975 mg) by Oral or Feeding Tube route every 8 hours as needed for mild pain 100 tablet 11    amLODIPine (NORVASC) 10 MG tablet Take 1 tablet (10 mg) by mouth at bedtime. 30 tablet 11    Calcium Carbonate-Vitamin D (CALCIUM 600 +D HIGH POTENCY) 600-10 MG-MCG TABS 1 tablet by Oral or Feeding Tube route daily 30 tablet 11    calcium carbonate-vitamin D (CALTRATE) 600-10 MG-MCG per tablet Take 1 tablet by mouth daily.      carvedilol ER (COREG CR) 20 MG 24 hr capsule Take 2 capsules (40 mg) by mouth daily 60 capsule 11    diclofenac (VOLTAREN) 1 % topical gel Apply 4 g topically 4 times daily Both hands 150 g 11    famotidine (PEPCID) 20 MG tablet Take 20 mg by mouth daily      ferrous sulfate (FEROSUL) 325 (65 Fe) MG tablet Take 1 tablet (325 mg) by mouth daily (with breakfast) 90 tablet 3    finasteride (PROSCAR) 5 MG tablet Take 5 mg by mouth daily      FLUoxetine (PROZAC) 20 MG capsule Take 2 capsules (40 mg) by mouth daily.      FLUoxetine (PROZAC) 40 MG capsule Take 40 mg by mouth every morning.      fluticasone-vilanterol (BREO ELLIPTA) 200-25 MCG/ACT inhaler Inhale 1 puff into the lungs daily. 28 each 11    hydroxychloroquine (PLAQUENIL) 200 MG tablet Take 1 tablet (200 mg) by mouth 2 times daily 180  tablet 3    insulin aspart (NOVOLOG PEN) 100 UNIT/ML pen Take 1-3 units TID if BS over 200. Max daily dose is 12 15 mL 11    insulin pen needle (32G X 4 MM) 32G X 4 MM miscellaneous Use 4 pen needles daily or as directed. 120 each 11    lamoTRIgine (LAMICTAL) 200 MG tablet Take 200 mg by mouth at bedtime.      lamoTRIgine (LAMICTAL) 25 MG tablet Take 250 mg by mouth daily.      levalbuterol (XOPENEX HFA) 45 MCG/ACT inhaler INHALE 2 PUFFS BY MOUTH EVERY 4 HOURS AS NEEDED FOR WHEEZING FOR SHORTNESS OF BREATH 15 g 0    levothyroxine (SYNTHROID/LEVOTHROID) 25 MCG tablet Take 1 tablet (25 mcg) by mouth daily 30 tablet 11    magnesium glycinate 100 MG CAPS capsule Take 4 capsules (400 mg) by mouth 3 times daily      metoprolol succinate ER (TOPROL XL) 25 MG 24 hr tablet Take 25 mg by mouth daily.      metoprolol succinate ER (TOPROL XL) 50 MG 24 hr tablet Take 1 tablet by mouth daily.      mirtazapine (REMERON) 7.5 MG tablet Take 7.5 mg by mouth At Bedtime      multivitamin w/minerals (THERA-VIT-M) tablet Take 1 tablet by mouth daily 30 tablet 11    mycophenolic acid (GENERIC EQUIVALENT) 180 MG EC tablet Take 1 tablet (180 mg) by mouth 2 times daily. 60 tablet 11    ondansetron (ZOFRAN ODT) 4 MG ODT tab Take 1 tablet (4 mg) by mouth every 6 hours as needed for nausea. 30 tablet 3    pantoprazole (PROTONIX) 40 MG EC tablet Take 1 tablet (40 mg) by mouth 2 times daily (before meals) 60 tablet 11    predniSONE (DELTASONE) 2.5 MG tablet Take 1 tablet (2.5 mg) by mouth every evening Total dose: 5mg in AM and 2.5 mg in PM 30 tablet 11    predniSONE (DELTASONE) 20 MG tablet       predniSONE (DELTASONE) 5 MG tablet Take 1 tablet (5 mg) by mouth every morning AND 0.5 tablets (2.5 mg) every evening. 135 tablet 3    propranolol ER (INDERAL LA) 60 MG 24 hr capsule Take 60 mg by mouth daily.      rOPINIRole (REQUIP) 0.25 MG tablet Take 0.25 mg by mouth At Bedtime      rosuvastatin (CRESTOR) 10 MG tablet Take 1 tablet (10 mg) by mouth  daily. On hold 1/30/25 for rising LFTs      senna-docusate (SENOKOT-S/PERICOLACE) 8.6-50 MG tablet Take 2 tablets by mouth 2 times daily as needed for constipation (Patient not taking: Reported on 10/15/2024) 120 tablet 11    sildenafil (VIAGRA) 100 MG tablet 100 mg      sulfamethoxazole-trimethoprim (BACTRIM) 400-80 MG tablet Take 1 tablet by mouth daily 30 tablet 11    tacrolimus (GENERIC EQUIVALENT) 0.5 MG capsule Take 1 capsule (0.5 mg) by mouth every evening. Total dose: 1.5 mg in AM and 2 mg in PM 30 capsule 11    tacrolimus (GENERIC EQUIVALENT) 1 MG capsule Take 1 capsule (1 mg) by mouth every morning AND 2 capsules (2 mg) every evening. Total dose: 1.5 mg in AM and 2 mg in PM. 270 capsule 3    warfarin ANTICOAGULANT (COUMADIN) 1 MG tablet Take 4 to 5 tablets daily or as directed by the Coumadin clinic 420 tablet 1    warfarin ANTICOAGULANT (COUMADIN) 1 MG tablet Take 4 tablets (4 mg) Sun, Wed, Fri; and 5 tablets (5 mg) all other days of the week or as directed by Anticoagulation Clinic. (Patient taking differently: Take 5 tablets (5 mg) Sun, Fri; and 4 tablets (4 mg) all other days of the week or as directed by Anticoagulation Clinic.) 385 tablet 1    zinc sulfate (ZINCATE) 220 (50 Zn) MG capsule Take 1 capsule (220 mg) by mouth daily. 90 capsule 3     No current facility-administered medications for this visit.     No Known Allergies  Past Medical History:   Diagnosis Date    BRENNAN (acute kidney injury) 10/17/2021    Anxiety     Aspergillus (H) 03/08/2023    Depression     HLD (hyperlipidemia)     HTN (hypertension)     Hypothyroidism     ILD (interstitial lung disease) (H)     Infection due to 2019 novel coronavirus 03/2023    Infection due to 2019 novel coronavirus 11/2023    Treated with remdesivir    Lung infection 07/12/2023    SALTY on CPAP     Oxygen dependent     BL 4L since ~6/2021    Primary hypertension 02/28/2022    Rheumatoid arthritis (H)     signs ~5/2020, dx 5/2021    S/P lung transplant (H)  10/16/2021    Shock liver 10/17/2021    Steroid-induced hyperglycemia     Traction bronchiectasis (H)     Tremor 01/08/2025       Past Surgical History:   Procedure Laterality Date    BRONCHOSCOPY (RIGID OR FLEXIBLE), DIAGNOSTIC N/A 1/26/2022    Procedure: BRONCHOSCOPY, WITH BRONCHOALVEOLAR LAVAGE AND BIOPSY;  Surgeon: Perlman, David Morris, MD;  Location:  GI    BRONCHOSCOPY (RIGID OR FLEXIBLE), DIAGNOSTIC N/A 4/19/2022    Procedure: BRONCHOSCOPY, WITH BRONCHOALVEOLAR LAVAGE AND BIOPSY;  Surgeon: Sherine Merrill MD;  Location: [facility]    BRONCHOSCOPY (RIGID OR FLEXIBLE), DIAGNOSTIC N/A 6/21/2022    Procedure: BRONCHOSCOPY, WITH BRONCHOALVEOLAR LAVAGE AND BIOPSY;  Surgeon: Aneesh Rubio MD;  [facility]: [facility]    BRONCHOSCOPY FLEXIBLE AND RIGID N/A 11/15/2023    Procedure: Surveillance Bronchoscopy with airway check;  Surgeon: Ron Daniels MD;  Location:  GI    COLONOSCOPY W/ BIOPSIES AND POLYPECTOMY  07/21/2020    CV CORONARY ANGIOGRAM N/A 09/08/2021    Procedure: Coronary Angiogram with possible intervention;  Surgeon: Jovon Bullock MD;  Location:  HEART CARDIAC CATH LAB    CV RIGHT HEART CATH MEASUREMENTS RECORDED N/A 09/08/2021    Procedure: Right Heart Cath;  Surgeon: Jovon Bullock MD;  Location:  HEART CARDIAC CATH LAB    ESOPHAGOSCOPY, GASTROSCOPY, DUODENOSCOPY (EGD), COMBINED N/A 10/23/2021    Procedure: ESOPHAGOGASTRODUODENOSCOPY (EGD);  Surgeon: Miquel Pisano MD;  Location:  GI    ESOPHAGOSCOPY, GASTROSCOPY, DUODENOSCOPY (EGD), COMBINED N/A 11/02/2021    Procedure: ESOPHAGOGASTRODUODENOSCOPY (EGD);  Surgeon: Daniel Ortiz MD;  Location:  GI    ESOPHAGOSCOPY, GASTROSCOPY, DUODENOSCOPY (EGD), COMBINED N/A 11/5/2021    Procedure: ESOPHAGOGASTRODUODENOSCOPY (EGD);  Surgeon: Ronnell Hernandez MD;  Location:  GI    EXTRACTION(S) DENTAL N/A 09/22/2021    Procedure: EXTRACTION tooth #19;  Surgeon: Deepak Tobin DDS;  [facility]    HERNIA REPAIR       IR CHEST TUBE PLACEMENT NON-TUNNELLED LEFT  11/03/2021    PICC TRIPLE LUMEN PLACEMENT Left 11/04/2021    5FR TL PICC. Right non occlusive thrombus subclavian vein.    REPLACE GASTROJEJUNOSTOMY TUBE, PERCUTANEOUS N/A 11/9/2021    Procedure: REPLACEMENT, GASTROJEJUNOSTOMY TUBE, PERCUTANEOUS;  Surgeon: Hernando Rodriguez MD;  [facility]    right acl      TRACHEOSTOMY PERCUTANEOUS N/A 10/29/2021    Procedure: Percutaneous Tracheostomy,;  Surgeon: Celine Jenkins MD;  [facility]    TRANSPLANT LUNG RECIPIENT SINGLE X2 Bilateral 10/16/2021    Procedure: TRANSPLANT, LUNG, RECIPIENT, BILATERAL, Bronchoscopy, on-pump perfusion, bilateral clamshell sternotomy;  Surgeon: Yanick Corral MD;  [facility]    XR ACROMIOCLAVICULAR JOINT BILATERAL         Social History     Socioeconomic History    Marital status: Single     Spouse name: Not on file    Number of children: Not on file    Years of education: Not on file    Highest education level: Not on file   Occupational History    Not on file   Tobacco Use    Smoking status: Never    Smokeless tobacco: Former    Tobacco comments:     Quit chewing over 20 years ago.   Vaping Use    Vaping status: Never Used   Substance and Sexual Activity    Alcohol use: Never    Drug use: Never    Sexual activity: Not on file   Other Topics Concern    Parent/sibling w/ CABG, MI or angioplasty before 65F 55M? Not Asked   Social History Narrative    Full time  for the [employer] starting May 24, 2023.     Social Drivers of Health     Financial Resource Strain: Low Risk  (9/20/2022)    Received from  and [facility], [facility]    Overall Financial Resource Strain (CARDIA)     Difficulty of Paying Living Expenses: Not very hard   Food Insecurity: No Food Insecurity (2/8/2025)    Received from [facility]    Hunger Vital Sign     Worried About Running Out of Food in the Last Year: Never true     Ran Out  "of Food in the Last Year: Never true   Transportation Needs: No Transportation Needs (2/8/2025)    Received from [facility]    PRAPARE - Transportation     Lack of Transportation (Medical): No     Lack of Transportation (Non-Medical): No   Physical Activity: Insufficiently Active (9/20/2022)    Received from [facility], [facility]    Exercise Vital Sign     Days of Exercise per Week: 4 days     Minutes of Exercise per Session: 20 min   Stress: Stress Concern Present (8/25/2021)    Received from [facility], [facility]    [employer] [place] [employer] - Occupational Stress Questionnaire     Feeling of Stress : Rather much   Social Connections: Moderately Isolated (8/25/2021)    Received from [facility], [facility]    Social Connection and Isolation Panel [NHANES]     Frequency of Communication with Friends and Family: Twice a week     Frequency of Social Gatherings with Friends and Family: Never     Attends Sabianist Services: Never     Active Member of Clubs or Organizations: Yes     Attends Club or Organization Meetings: Never     Marital Status: Living with partner   Interpersonal Safety: Not on file   Housing Stability: Low Risk  (2/8/2025)    Received from [facility]    Housing Stability Vital Sign     Unable to Pay for Housing in the Last Year: No     Number of Times Moved in the Last Year: 0     Homeless in the Last Year: No     /82 (BP Location: Right arm, Patient Position: Sitting, Cuff Size: Adult Regular)   Pulse 60   Temp 98.1  F (36.7  C) (Oral)   Ht 1.626 m (5' 4\")   Wt 69.9 kg (154 lb)   SpO2 95%   BMI 26.43 kg/m     There is no height or weight on file to calculate BMI.  Exam:   GENERAL APPEARANCE: Well developed, well nourished, alert, and in no apparent distress.  EYES: PERRL, EOMI  HENT: Nasal mucosa with no edema and no " hyperemia. No nasal polyps.  EARS: Canals clear, TMs normal  MOUTH: Oral mucosa is moist, without any lesions, no tonsillar enlargement, no oropharyngeal exudate.  NECK: supple, no masses, no thyromegaly.  LYMPHATICS: No significant axillary, cervical, or supraclavicular nodes.  RESP: Expiratory wheezing throughout along with crackles.   CV: Normal S1, S2, regular rhythm, normal rate. No murmur.  No rub. No gallop. No LE edema.   ABDOMEN:  Bowel sounds normal, soft, nontender, no HSM or masses.   MS: extremities normal. No clubbing. No cyanosis.  SKIN: no rash on limited exam  NEURO: Mentation intact, speech normal, normal strength and tone, normal gait and stance  PSYCH: mentation appears normal and affect normal/bright  Results:  Recent Results (from the past week)   INR (External Result)    Collection Time: 02/12/25 12:00 AM   Result Value Ref Range    INR (External) 2.9    CBC with platelets    Collection Time: 02/18/25  7:57 AM   Result Value Ref Range    WBC Count 11.9 (H) 4.0 - 11.0 10e3/uL    RBC Count 4.50 4.40 - 5.90 10e6/uL    Hemoglobin 13.6 13.3 - 17.7 g/dL    Hematocrit 40.2 40.0 - 53.0 %    MCV 89 78 - 100 fL    MCH 30.2 26.5 - 33.0 pg    MCHC 33.8 31.5 - 36.5 g/dL    RDW 14.6 10.0 - 15.0 %    Platelet Count 169 150 - 450 10e3/uL     Results as noted above.    PFT Interpretation:  Moderate obstructive ventilatory defect.  Decreased from previous.  Below recent best.   Valid Maneuver

## 2025-02-18 NOTE — TELEPHONE ENCOUNTER
DATE:  2/18/2025     TIME OF RECEIPT FROM LAB:  8:40 am    ORDERING PROVIDER: Dr. Woods    LAB TEST:  INR    LAB VALUE:  5.64    RESULTS GIVEN WITH READ-BACK TO (PROVIDER):  Gisel Denny RN    TIME LAB VALUE REPORTED TO PROVIDER:   8:43 am

## 2025-02-18 NOTE — NURSING NOTE
Chief Complaint   Patient presents with    Tremors     /73 (BP Location: Right arm, Patient Position: Sitting, Cuff Size: Adult Regular)   Pulse 63   Resp 18   SpO2 96%   Holli Phillips

## 2025-02-18 NOTE — PROGRESS NOTES
"ANTICOAGULATION MANAGEMENT     Edson Thornton Jr. 59 year old male is on warfarin with therapeutic INR result. (Goal INR 2.0-3.0)    Recent labs: (last 7 days)     02/18/25  0757   INR 5.64*       ASSESSMENT     Source(s): Chart Review and Patient/Caregiver Call     Warfarin doses taken: While hospitalized on last week: anticoagulation calendar updated with inpatient dosing.  Discharged on: home regimen continued  Diet: No new diet changes identified  Medication/supplement changes:  Antibiotics have been discontinued.  New illness, injury, or hospitalization: Yes: Hospital Admission on 2/8/25 - 2/12/25 forv Community acquired pneumonia; Covid +; norovirus +   COPD exacerbation.  (Had influenza A in January)   Nausea/vomiting/diarrhea/fever/weakness/shortness of breath/wheezing.      He was admitted to [place], [place]  Signs or symptoms of bleeding or clotting: No  Previous result: Therapeutic last 2(+) visits  Additional findings:  Consult with Antonella Ren [facility].  Bret has been sick, and per patient \"I'm feeling so much better\".  He is recovering from illness, temporary factors.       PLAN     Recommended plan for temporary change(s) affecting INR     Dosing Instructions: hold 1 and 1/2 doses then continue your current warfarin dose with next INR in 3 days       Summary  As of 2/18/2025      Full warfarin instructions:  2/18: Hold; 2/19: 1.5 mg; Otherwise 4 mg every Sun, Tue, Thu; 3 mg all other days   Next INR check:  2/21/2025               Telephone call with Bret who verbalizes understanding and agrees to plan and who agrees to plan and repeated back plan correctly  Sent [employer] message with dosing and follow up instructions    Patient using outside facility for labs    Education provided: Please call back if any changes to your diet, medications or how you've been taking warfarin  Taking warfarin: purpose of warfarin and how it works, take warfarin at same time each day; preferably in the evening, " prescribed tablet strength and color, importance of following [facility] instructions vs instructions on the prescription bottle, and Importance of taking warfarin as instructed  Goal range and lab monitoring: goal range and significance of current result, Importance of therapeutic range, and Importance of following up at instructed interval  Interaction IS anticipated between warfarin and Stopping all antibiotics  Symptom monitoring: monitoring for bleeding signs and symptoms, monitoring for clotting signs and symptoms, monitoring for stroke signs and symptoms, when to seek medical attention/emergency care, and if you hit your head or have a bad fall seek emergency care  Importance of notifying anticoagulation clinic for: changes in medications; a sooner lab recheck maybe needed, diarrhea, nausea/vomiting, reduced intake, cold/flu, and/or infections; a sooner lab recheck maybe needed, upcoming surgeries and procedures 2 weeks in advance, and if you did not receive dosing instructions on the same day as your labs were checked    Plan made with [facility] Pharmacist Nick Hobbs, RN  2/18/2025  Anticoagulation Clinic  [employer] for routing messages: p [facility] patient phone line: [phone]        _______________________________________________________________________     Anticoagulation Episode Summary       Current INR goal:  2.0-3.0   TTR:  55.4% (1 y)   Target end date:  Indefinite   Send INR reminders to:  [facility]    Indications    Atrial fibrillation  unspecified type (H) [I48.91]  Encounter for long-term (current) use of high-risk medication [Z79.899]  Immunosuppressed status [D84.9]  S/P lung transplant (H) [Z94.2]  Ischemic stroke (H) [I63.9]             Comments:  --             Anticoagulation Care Providers       Provider Role Specialty Phone number    Abioudn Woods MD Referring Pulmonary Disease [phone]

## 2025-02-18 NOTE — Clinical Note
Alex Barrera,   Do you have any resources on computer setting adjustment for folks with tremor we could send to [place]?  Thanks! Rema

## 2025-02-19 DIAGNOSIS — Z94.2 S/P LUNG TRANSPLANT (H): ICD-10-CM

## 2025-02-19 PROBLEM — Z82.0 FAMILY HISTORY OF PARKINSON DISEASE: Status: ACTIVE | Noted: 2025-02-19

## 2025-02-19 RX ORDER — PREDNISONE 10 MG/1
TABLET ORAL
Qty: 24 TABLET | Refills: 0 | Status: SHIPPED | OUTPATIENT
Start: 2025-02-19 | End: 2025-03-03

## 2025-02-19 NOTE — LETTER
PHYSICIAN ORDERS      DATE & TIME ISSUED: 2025 2:30 PM  PATIENT NAME: Edson Thornton Jr.   : 1965     [facility] MR# [if applicable]: [id number]     DIAGNOSIS:  Lung Transplant  Z94.2    Please check tacrolimus level twice weekly week of 25    Any questions please call: Alfred [phone]    Please fax these results to [phone].      .  Abiodun Woods MD  Division of Pulmonary, Allergy, Critical Care and Sleep Medicine  Professor of Medicine

## 2025-02-19 NOTE — PROGRESS NOTES
Per [facility]: Hi Dr. Woods - you saw [facility] today as did neurology. I saw him for medication induced tremors - potentially due to tacrolimus. I have had some success with switching to tacro XR and tremors lessening     Recommended to get a trough level 2 times during the first week of initiation and after day 7 as that's when it reaches steady stat     Medication conversion is 1:1  Will start pt on 1.5mg daily for recent tacrolimus level of 14.3 on 2/18  Goal 8-10    Recommended to get a trough level 2 times during the first week of initiation and after day 7 as that's when it reaches steady state       RVP reviewed by Dr. Woods: negative   -increase prednisone as follows: 30mg x4 days, 20mg x4 days, 10mg x4 days, back to baseline 7.5mg dosing   -pt instructed you may have to use more insulin while increasing steroids     Pt understanding of plan. All questions answered at this time.

## 2025-02-20 LAB
COPPER SERPL-MCNC: 70.8 UG/DL
TESTOST SERPL-MCNC: 414 NG/DL (ref 240–950)

## 2025-02-24 ENCOUNTER — ANTICOAGULATION THERAPY VISIT (OUTPATIENT)
Dept: ANTICOAGULATION | Facility: CLINIC | Age: 60
End: 2025-02-24
Payer: COMMERCIAL

## 2025-02-24 DIAGNOSIS — Z94.2 S/P LUNG TRANSPLANT (H): Chronic | ICD-10-CM

## 2025-02-24 DIAGNOSIS — I48.91 ATRIAL FIBRILLATION, UNSPECIFIED TYPE (H): Primary | ICD-10-CM

## 2025-02-24 DIAGNOSIS — I63.9 ISCHEMIC STROKE (H): ICD-10-CM

## 2025-02-24 DIAGNOSIS — Z79.899 ENCOUNTER FOR LONG-TERM (CURRENT) USE OF HIGH-RISK MEDICATION: ICD-10-CM

## 2025-02-24 DIAGNOSIS — D84.9 IMMUNOSUPPRESSED STATUS: ICD-10-CM

## 2025-02-24 LAB — INR (EXTERNAL): 3.1 (ref 0.9–1.1)

## 2025-02-24 NOTE — TELEPHONE ENCOUNTER
Calls patient back to clarify novolog use.     Patient states he uses novolog PRN when BG is 200+ which occurs rarely (when changing coumadin doses).     Per josep Armstrong to send prescription for Novolog. Patient to use sliding scale provided at discharge from hospital post transplant.     Will check Hgb A1C with next clinic visit.    Spontaneous

## 2025-02-24 NOTE — PROGRESS NOTES
ANTICOAGULATION MANAGEMENT     Edson Thornton Jr. 59 year old male is on warfarin with therapeutic INR result. (Goal INR 2.0-3.0)    Recent labs: (last 7 days)     02/24/25  1100   INR 3.1*       ASSESSMENT     Source(s): Chart Review and Patient/Caregiver Call     Warfarin doses taken: Warfarin taken as instructed  Diet: No new diet changes identified  Medication/supplement changes: continues on prednisone taper  New illness, injury, or hospitalization: Recently had covid  Signs or symptoms of bleeding or clotting: No  Previous result: Supratherapeutic  Additional findings: None       PLAN     Recommended plan for no diet, medication or health factor changes affecting INR     Dosing Instructions: decrease your warfarin dose (12.5% change) with next INR in 3 days       Summary  As of 2/24/2025      Full warfarin instructions:  3 mg every day   Next INR check:  2/27/2025               Telephone call with Bret who verbalizes understanding and agrees to plan and who agrees to plan and repeated back plan correctly    Patient using outside facility for labs    Education provided: Please call back if any changes to your diet, medications or how you've been taking warfarin    Plan made per [facility] anticoagulation protocol and per [facility] protocol    Freda Bhakta RN  2/24/2025  Anticoagulation Clinic  [employer] for routing messages: p [facility]  [facility] patient phone line: [phone]      _______________________________________________________________________     Anticoagulation Episode Summary       Current INR goal:  2.0-3.0   TTR:  53.7% (1 y)   Target end date:  Indefinite   Send INR reminders to:  [facility]    Indications    Atrial fibrillation  unspecified type (H) [I48.91]  Encounter for long-term (current) use of high-risk medication [Z79.899]  Immunosuppressed status [D84.9]  S/P lung transplant (H) [Z94.2]  Ischemic stroke (H) [I63.9]             Comments:  --             Anticoagulation Care  Providers       Provider Role Specialty Phone number    Abiodun Woods MD Referring Pulmonary Disease [phone]

## 2025-02-25 LAB — PROSPERA TRANSPLANT MONITORING: 0.35 %

## 2025-02-27 ENCOUNTER — ANTICOAGULATION THERAPY VISIT (OUTPATIENT)
Dept: ANTICOAGULATION | Facility: CLINIC | Age: 60
End: 2025-02-27
Payer: COMMERCIAL

## 2025-02-27 DIAGNOSIS — I63.9 ISCHEMIC STROKE (H): ICD-10-CM

## 2025-02-27 DIAGNOSIS — Z79.899 ENCOUNTER FOR LONG-TERM (CURRENT) USE OF HIGH-RISK MEDICATION: ICD-10-CM

## 2025-02-27 DIAGNOSIS — I48.91 ATRIAL FIBRILLATION, UNSPECIFIED TYPE (H): Primary | ICD-10-CM

## 2025-02-27 DIAGNOSIS — Z94.2 S/P LUNG TRANSPLANT (H): Chronic | ICD-10-CM

## 2025-02-27 DIAGNOSIS — D84.9 IMMUNOSUPPRESSED STATUS: ICD-10-CM

## 2025-02-27 LAB — INR (EXTERNAL): 3.6 (ref 0.9–1.1)

## 2025-02-27 NOTE — PROGRESS NOTES
ANTICOAGULATION MANAGEMENT     Edson Thornton Jr. 59 year old male is on warfarin with supratherapeutic INR result. (Goal INR 2.0-3.0)    Recent labs: (last 7 days)     02/27/25  0000   INR 3.6*       ASSESSMENT     Source(s): Chart Review and Patient/Caregiver Call     Warfarin doses taken: Warfarin taken as instructed  Diet: No new diet changes identified  Medication/supplement changes:  prednisone taper since 2/19 .   New illness, injury, or hospitalization: No  Signs or symptoms of bleeding or clotting: No  Previous result: Supratherapeutic  Additional findings: None       PLAN     Recommended plan for temporary change(s) affecting INR     Dosing Instructions: partial hold then decrease your warfarin dose (9.5% change) with next INR in 1 week       Summary  As of 2/27/2025      Full warfarin instructions:  2/27: 1 mg; Otherwise 2 mg every Mon, Fri; 3 mg all other days   Next INR check:  3/6/2025               Telephone call with Bret who verbalizes understanding and agrees to plan    Patient using outside facility for labs    Education provided: Please call back if any changes to your diet, medications or how you've been taking warfarin  Goal range and lab monitoring: goal range and significance of current result, Importance of therapeutic range, and Importance of following up at instructed interval  Symptom monitoring: monitoring for bleeding signs and symptoms, when to seek medical attention/emergency care, and if you hit your head or have a bad fall seek emergency care    Plan made with [facility] Pharmacist Usha Villasenor, RN  2/27/2025  Anticoagulation Clinic  "[employer] for routing messages: p [facility] patient phone line: [phone]        _______________________________________________________________________     Anticoagulation Episode Summary       Current INR goal:  2.0-3.0   TTR:  53.0% (1 y)   Target end date:  Indefinite   Send INR reminders to:  [facility]     Indications    Atrial fibrillation  unspecified type (H) [I48.91]  Encounter for long-term (current) use of high-risk medication [Z79.899]  Immunosuppressed status [D84.9]  S/P lung transplant (H) [Z94.2]  Ischemic stroke (H) [I63.9]             Comments:  --             Anticoagulation Care Providers       Provider Role Specialty Phone number    Abiodun Woods MD Referring Pulmonary Disease [phone]

## 2025-02-27 NOTE — PROGRESS NOTES
ANTICOAGULATION MANAGEMENT     Edson Thornton Jr. 59 year old male is on warfarin with supratherapeutic INR result. (Goal INR 2.0-3.0)    Recent labs: (last 7 days)     02/27/25  0000   INR 3.6*       ASSESSMENT     Source(s): Chart Review  Previous INR was Supratherapeutic  Medication, diet, health changes since last INR - pt started prednisone taper on 2/19. Should be about 12 days long, then will eventually go back to his usual baseline dosing of 7.5 mg daily.  left asking him to call [facility] back to confirm the dosing he took since Monday this week (his last INR), what dose he is currently on for prednisone and when he will taper it down again. Will try again later.     Message sent to Usha Singh to decide on plan.      PLAN     Unable to reach pt today.    VM left to call [facility] back. Will try again later.     Follow up required to confirm warfarin dose taken and assess for changes    Tammie Villasenor, RN  2/27/2025  Anticoagulation Clinic  [facility] for routing messages: [facility]  [facility] patient phone line: [phone]

## 2025-03-04 ENCOUNTER — TELEPHONE (OUTPATIENT)
Dept: TRANSPLANT | Facility: CLINIC | Age: 60
End: 2025-03-04
Payer: COMMERCIAL

## 2025-03-04 DIAGNOSIS — Z94.2 S/P LUNG TRANSPLANT (H): ICD-10-CM

## 2025-03-04 NOTE — TELEPHONE ENCOUNTER
Checking in regarding how patient is feeling after prednisone burst and taper. Finished on 3/3. Feeling better, no more shortness of breath or coughing up sputum.     Also wondering if patient was able to get an IGG checked locally? Patient said he has not yet.     Patient states he can go later this week to get IGG checked. Advised patient to reach out if he needs anything or any changes occur.

## 2025-03-04 NOTE — LETTER
PHYSICIAN ORDERS      DATE & TIME ISSUED: 2025 4:38 PM  PATIENT NAME: Edson Thornton Jr.   : 1965     [facility] MR# [if applicable]: [id number]     DIAGNOSIS:  Lung Transplant  Z94.2    Please collect the following week of 3/3/25:    IGG    Any questions please call: [phone]    Please fax these results to [phone].      .  Abiodun Woods MD  Division of Pulmonary, Allergy, Critical Care and Sleep Medicine  Professor of Medicine

## 2025-03-10 ENCOUNTER — ANTICOAGULATION THERAPY VISIT (OUTPATIENT)
Dept: ANTICOAGULATION | Facility: CLINIC | Age: 60
End: 2025-03-10
Payer: COMMERCIAL

## 2025-03-10 DIAGNOSIS — Z94.2 S/P LUNG TRANSPLANT (H): Chronic | ICD-10-CM

## 2025-03-10 DIAGNOSIS — I63.9 ISCHEMIC STROKE (H): ICD-10-CM

## 2025-03-10 DIAGNOSIS — I48.91 ATRIAL FIBRILLATION, UNSPECIFIED TYPE (H): Primary | ICD-10-CM

## 2025-03-10 DIAGNOSIS — D84.9 IMMUNOSUPPRESSED STATUS: ICD-10-CM

## 2025-03-10 DIAGNOSIS — Z79.899 ENCOUNTER FOR LONG-TERM (CURRENT) USE OF HIGH-RISK MEDICATION: ICD-10-CM

## 2025-03-10 NOTE — PROGRESS NOTES
ANTICOAGULATION MANAGEMENT     Edson Thornton Jr. 59 year old male is on warfarin with therapeutic INR result. (Goal INR 2.0-3.0)    Recent labs: (last 7 days)     03/07/25  0744   INR 3.0*       ASSESSMENT     Source(s): Chart Review and Patient/Caregiver Call     Warfarin doses taken: PartiallyHeld for supratherapeutic INR results  recently which may be affecting INR  Diet: No new diet changes identified  Medication/supplement changes:  Prednisone  stopped on 3/3/25 which may be decreasing INR today. Closer INR monitoring recommended.  New illness, injury, or hospitalization: Yes: recovering from Covid, pnuemonia, URI  Signs or symptoms of bleeding or clotting: No  Previous result: Supratherapeutic  Additional findings: None       PLAN     Recommended plan for no diet, medication or health factor changes affecting INR     Dosing Instructions: Continue your current warfarin dose with next INR in 1 week       Summary  As of 3/10/2025      Full warfarin instructions:  2 mg every Mon, Fri; 3 mg all other days   Next INR check:  3/14/2025               Telephone call with Bret who verbalizes understanding and agrees to plan  Sent [employer] message with dosing and follow up instructions    Patient using outside facility for labs    Education provided: Please call back if any changes to your diet, medications or how you've been taking warfarin  Interaction IS anticipated between warfarin and steroids  Symptom monitoring: monitoring for bleeding signs and symptoms, monitoring for clotting signs and symptoms, monitoring for stroke signs and symptoms, when to seek medical attention/emergency care, and if you hit your head or have a bad fall seek emergency care  Importance of notifying anticoagulation clinic for: changes in medications; a sooner lab recheck maybe needed, diarrhea, nausea/vomiting, reduced intake, cold/flu, and/or infections; a sooner lab recheck maybe needed, upcoming surgeries and procedures 2 weeks in  advance, and if you did not receive dosing instructions on the same day as your labs were checked    Plan made with [facility] Pharmacist Nick Richmond RN  3/10/2025  Anticoagulation Clinic  [facility] for routing messages: p [facility] patient phone line: [phone]        _______________________________________________________________________     Anticoagulation Episode Summary       Current INR goal:  2.0-3.0   TTR:  50.3% (1 y)   Target end date:  Indefinite   Send INR reminders to:  [facility]    Indications    Atrial fibrillation  unspecified type (H) [I48.91]  Encounter for long-term (current) use of high-risk medication [Z79.899]  Immunosuppressed status [D84.9]  S/P lung transplant (H) [Z94.2]  Ischemic stroke (H) [I63.9]             Comments:  --             Anticoagulation Care Providers       Provider Role Specialty Phone number    Abiodun Woods MD Referring Pulmonary Disease [phone]

## 2025-03-13 DIAGNOSIS — Z94.2 S/P LUNG TRANSPLANT (H): ICD-10-CM

## 2025-03-14 ENCOUNTER — LAB (OUTPATIENT)
Dept: LAB | Facility: CLINIC | Age: 60
End: 2025-03-14
Payer: COMMERCIAL

## 2025-03-14 DIAGNOSIS — Z94.2 LUNG REPLACED BY TRANSPLANT (H): ICD-10-CM

## 2025-03-14 PROCEDURE — 80197 ASSAY OF TACROLIMUS: CPT

## 2025-03-18 ENCOUNTER — DOCUMENTATION ONLY (OUTPATIENT)
Dept: ANTICOAGULATION | Facility: CLINIC | Age: 60
End: 2025-03-18
Payer: COMMERCIAL

## 2025-03-18 ENCOUNTER — MYC REFILL (OUTPATIENT)
Dept: TRANSPLANT | Facility: CLINIC | Age: 60
End: 2025-03-18
Payer: COMMERCIAL

## 2025-03-18 DIAGNOSIS — Z94.2 S/P LUNG TRANSPLANT (H): ICD-10-CM

## 2025-03-18 LAB
TACROLIMUS BLD-MCNC: 10.3 UG/L (ref 5–15)
TME LAST DOSE: NORMAL H
TME LAST DOSE: NORMAL H

## 2025-03-18 RX ORDER — LEVOTHYROXINE SODIUM 25 UG/1
25 TABLET ORAL DAILY
Qty: 30 TABLET | Refills: 11 | Status: SHIPPED | OUTPATIENT
Start: 2025-03-18

## 2025-03-18 RX ORDER — SULFAMETHOXAZOLE AND TRIMETHOPRIM 400; 80 MG/1; MG/1
1 TABLET ORAL DAILY
Qty: 30 TABLET | Refills: 11 | Status: SHIPPED | OUTPATIENT
Start: 2025-03-18

## 2025-03-18 RX ORDER — PANTOPRAZOLE SODIUM 40 MG/1
40 TABLET, DELAYED RELEASE ORAL
Qty: 60 TABLET | Refills: 11 | Status: SHIPPED | OUTPATIENT
Start: 2025-03-18

## 2025-03-18 NOTE — PROGRESS NOTES
ANTICOAGULATION  MANAGEMENT     Interacting Medication Review    Interacting medication(s): Sulfamethoxazole-Trimethoprim (Bactrim) with warfarin.    Duration: Long-term    Indication: Prophylaxis-S/P lung transplant (H)     New medication?: No, long term medication. No affect on INR anticipated at this time.        PLAN     No adjustment to anticoagulation plan needed; no further interaction anticipated with stable long term medication         Patient was not contacted    No adjustment to anticoagulation calendar was required    No change to [facility] priority; patient stable on interacting medication    Plan made per [facility] anticoagulation protocol    ESSIE COLÓN RN  3/18/2025  Anticoagulation Clinic  [employer] for routing messages: [facility]  [facility] patient phone line: [phone]

## 2025-03-19 NOTE — RESULT ENCOUNTER NOTE
Tacrolimus level 10.3 at close to 12 hours on 3/14/25  Goal 8-10  Level close to goal, no change in dose

## 2025-03-27 DIAGNOSIS — Z94.2 S/P LUNG TRANSPLANT (H): ICD-10-CM

## 2025-03-27 RX ORDER — TACROLIMUS 0.5 MG/1
CAPSULE ORAL
Qty: 30 CAPSULE | Refills: 0 | Status: SHIPPED | OUTPATIENT
Start: 2025-03-27

## 2025-03-27 NOTE — PROGRESS NOTES
Pt calls stating he has not gotten his tacrolimus (astagraf xl) from [facility]. Original Rx was sent to [facility] pharmacy per patient request.     New Rx sent to [facility]. Pt instructed to let RNCC know if he has trouble receiving the medication.     Writer will send in ten day supply of 0.5mg tablets to local pharmacy as patient will be out of tac in 4 days.     Pt understanding of plan

## 2025-04-07 ENCOUNTER — ANTICOAGULATION THERAPY VISIT (OUTPATIENT)
Dept: ANTICOAGULATION | Facility: CLINIC | Age: 60
End: 2025-04-07
Payer: COMMERCIAL

## 2025-04-07 DIAGNOSIS — I63.9 ISCHEMIC STROKE (H): ICD-10-CM

## 2025-04-07 DIAGNOSIS — Z94.2 S/P LUNG TRANSPLANT (H): Chronic | ICD-10-CM

## 2025-04-07 DIAGNOSIS — Z79.899 ENCOUNTER FOR LONG-TERM (CURRENT) USE OF HIGH-RISK MEDICATION: ICD-10-CM

## 2025-04-07 DIAGNOSIS — I48.91 ATRIAL FIBRILLATION, UNSPECIFIED TYPE (H): Primary | ICD-10-CM

## 2025-04-07 DIAGNOSIS — D84.9 IMMUNOSUPPRESSED STATUS: ICD-10-CM

## 2025-04-07 NOTE — PROGRESS NOTES
04/07/25 12:41 PM  PATIENT LAB/IMAGING STATUS : Orders completed / No further action needed       04/17/24 2:22 PM  PATIENT LAB/IMAGING STATUS : No pending lab orders           Endocrinology [facility]         Follow up for: Osteoporosis    Assessment:  59 y old male with s/p lung transplant with osteoporosis.  T score of -2.7 at the lumbar spine (L2-4) with risk factor of s/p lung transplant, chronic steroid treatment, rheumatoid arthritis, hypovitaminosis D, possibly hypogonadism, comorbidity of hyperparathyroidism, diabetes, hypothyroidism, history of stroke    Osteoporosis  Patient with osteoporosis.  T-score improved on recent bone density which is very reassuring.  Will continue with Reclast infusions.  Patient is scheduled for third infusion in June 2025 with goal of total of 6 infusions reviewed in detail   2 separate tooth extraction from Reclast infusions and preferably  Prior to the Reclast infusion assuring that dental work is healed which typically takes 4 to 6 weeks.    Vitamin D is borderline, recommended to take additional 800 or 1000 units/day    Subclinical hypothyroidism  Patient is on small dose of levothyroxine with recent TSH borderline.  Patient is taking the medication to get her with calcium and likely not absorbing it.  Discussed today to discontinue levothyroxine and follow hormone levels.  Patient does not need to start levothyroxine for subclinical hypothyroidism.  Will follow TSH level.  Due to need for calcium supplementation and iron supplementation taking levothyroxine properly does present an extra burden to the patient    Plan:   Add vitamin D 800 over 1000 units/day  Cont Reclast infusion #3 soon in 6/2025  Cont Calcium and Vitamin D supplements    Cont diet rich in Calcium  DXA in 2025    The  Following reports and notes were reviewed: recent DXA scan, labs       The Longitudinal plan of care for osteoporosis condition was addressed during this visit. Due to added  complexity of care, we will continue to support Edson , and the subsequent management of this condition(s) and with the ongoing continuity of care of this condition.    Billie Huntley MD  Endocrinology and Diabetes  Telephone contact:  [facility] [place] [phone]  [facility] [place] [phone]      Interval history  1 year follow up     Pt has had fall with tripping outside continues to struggle with incomplete field of vision,     Most recent DXA 2/18/2025 improvement in bone density at hip and spine with lowest T-score of -2.2 at the right femoral neck     Prior fractures no  Height loss no    Osteoporosis medications 6/2024, 6/2023 Reclast infusion, tolerating it well    Dietary Calcium cheese some nuts ome bean green  Calcium supplements Calcium once MVM  Vitamin D supplements  with MVM and Calcium.  Alcohol consumption no  Smoking no    Exercise walking, some yard work.    Dental status has seen dentist, had dental cleaning, is going to have another tooth pulled    Current Problem List:   Patient Active Problem List   Diagnosis    S/P lung transplant (H)    BRENNAN (acute kidney injury)    Shock liver    Ischemic stroke (H)    Atrial fibrillation, unspecified type (H)    Encounter for long-term (current) use of high-risk medication    Immunosuppressed status    Primary hypertension    Pulmonary air trapping    Hypomagnesemia    Bronchial hemorrhage    Pulmonary embolism (H)    Osteoporosis    Clinical diagnosis of COVID-19    Low immunoglobulin level    Aspergillus (H)    Lung infection    Tremor    Acute cough    Cough, unspecified    Snoring    Adjustment disorder with anxiety    Adjustment disorder with mixed disturbance of emotions and conduct    Anemia    Anxiety disorder, unspecified    Anxiety    Generalized anxiety disorder    Benign prostatic hyperplasia    Chronic kidney disease, stage 3 unspecified (H)    Chronic kidney disease    Depression, unspecified     Dysthymic disorder    Other specified depressive episodes    Duodenal anomaly    Exposure to potentially hazardous substance    Fever, unspecified    Gastroesophageal reflux disease    Hyperlipidemia    Hypogonadism, male    Hypothyroidism    Immunocompromised state    Immunodeficiency, unspecified    Long-term use of high-risk medication    Major depressive disorder, recurrent episode, in full remission    Obstructive sleep apnea (adult) (pediatric)    Hypersomnia, unspecified    Insomnia, unspecified    Other insomnia    Other sequelae of cerebral infarction    Other specified congenital malformations of intestine    Other specified soft tissue disorders    Personal history of other diseases of the circulatory system    Presence of transplanted lung (H)    Primary osteoarthritis involving multiple joints    Problems in relationship with spouse or partner    Restless leg syndrome    Rheumatoid arthritis with rheumatoid factor of multiple sites without organ or systems involvement (H)    Seropositive rheumatoid arthritis (H)    Rheumatoid arthritis, unspecified (H)    Shortness of breath    Steroid-induced diabetes mellitus    Suicidal ideations    Type 2 diabetes mellitus without complications (H)    Unspecified hearing loss, bilateral    Unspecified abdominal pain    Unspecified visual disturbance    Urinary incontinence    Paroxysmal atrial fibrillation (H)    COVID-19    Long term (current) use of systemic steroids    ILD (interstitial lung disease) (H)    Rheumatoid lung disease (H)    Cerebrovascular accident (CVA) (H)    Essential hypertension    History of lung transplant (H)    Encounter for aftercare following lung transplant (H)    Lung transplant status (H)    CAP (community acquired pneumonia)    Family history of Parkinson disease    Hypogammaglobulinemia       Past Medical and Past Surgical History:  Past Medical History:   Diagnosis Date    BRENNAN (acute kidney injury) 10/17/2021    Anxiety      Aspergillus (H) 03/08/2023    Depression     Family history of Parkinson disease 02/19/2025    HLD (hyperlipidemia)     HTN (hypertension)     Hypothyroidism     ILD (interstitial lung disease) (H)     Infection due to 2019 novel coronavirus 03/2023    Infection due to 2019 novel coronavirus 11/2023    Treated with remdesivir    Lung infection 07/12/2023    SALTY on CPAP     Oxygen dependent     BL 4L since ~6/2021    Primary hypertension 02/28/2022    Rheumatoid arthritis (H)     signs ~5/2020, dx 5/2021    S/P lung transplant (H) 10/16/2021    Shock liver 10/17/2021    Steroid-induced hyperglycemia     Traction bronchiectasis (H)     Tremor 01/08/2025       Past Surgical History:   Procedure Laterality Date    BRONCHOSCOPY (RIGID OR FLEXIBLE), DIAGNOSTIC N/A 1/26/2022    Procedure: BRONCHOSCOPY, WITH BRONCHOALVEOLAR LAVAGE AND BIOPSY;  Surgeon: Perlman, David Morris, MD;  [facility]    BRONCHOSCOPY (RIGID OR FLEXIBLE), DIAGNOSTIC N/A 4/19/2022    Procedure: BRONCHOSCOPY, WITH BRONCHOALVEOLAR LAVAGE AND BIOPSY;  Surgeon: Sherine Merrill MD;  [facility]    BRONCHOSCOPY (RIGID OR FLEXIBLE), DIAGNOSTIC N/A 6/21/2022    Procedure: BRONCHOSCOPY, WITH BRONCHOALVEOLAR LAVAGE AND BIOPSY;  Surgeon: Aneesh Rubio MD;  [facility]    BRONCHOSCOPY FLEXIBLE AND RIGID N/A 11/15/2023    Procedure: Surveillance Bronchoscopy with airway check;  Surgeon: Ron Daniels MD;  [facility]    COLONOSCOPY W/ BIOPSIES AND POLYPECTOMY  07/21/2020    CV CORONARY ANGIOGRAM N/A 09/08/2021    Procedure: Coronary Angiogram with possible intervention;  Surgeon: Jovon Bullock MD;  Location:  [facility]    CV RIGHT HEART CATH MEASUREMENTS RECORDED N/A 09/08/2021    Procedure: Right Heart Cath;  Surgeon: Jovon Bullock MD;  Location:  HEART CARD[facility]AC CATH LAB    ESOPHAGOSCOPY, GASTROSCOPY, DUODENOSCOPY (EGD), COMBINED N/A 10/23/2021    Procedure: ESOPHAGOGASTRODUODENOSCOPY (EGD);  Surgeon:  Miquel Pisano MD;  [facility]    ESOPHAGOSCOPY, GASTROSCOPY, DUODENOSCOPY (EGD), COMBINED N/A 11/02/2021    Procedure: ESOPHAGOGASTRODUODENOSCOPY (EGD);  Surgeon: Daniel Ortiz MD;  [facility]    ESOPHAGOSCOPY, GASTROSCOPY, DUODENOSCOPY (EGD), COMBINED N/A 11/5/2021    Procedure: ESOPHAGOGASTRODUODENOSCOPY (EGD);  Surgeon: Ronnell Hernandez MD;  [facility]    EXTRACTION(S) DENTAL N/A 09/22/2021    Procedure: EXTRACTION tooth #19;  Surgeon: Deepak Tobin DDS;  [facility]    HERNIA REPAIR      IR CHEST TUBE PLACEMENT NON-TUNNELLED LEFT  11/03/2021    PICC TRIPLE LUMEN PLACEMENT Left 11/04/2021    5FR TL PICC. Right non occlusive thrombus subclavian vein.    REPLACE GASTROJEJUNOSTOMY TUBE, PERCUTANEOUS N/A 11/9/2021    Procedure: REPLACEMENT, GASTROJEJUNOSTOMY TUBE, PERCUTANEOUS;  Surgeon: Hernando Rodriguez MD;  [facility]    right acl      TRACHEOSTOMY PERCUTANEOUS N/A 10/29/2021    Procedure: Percutaneous Tracheostomy,;  Surgeon: Celine Jenkins MD;  [facility]    TRANSPLANT LUNG RECIPIENT SINGLE X2 Bilateral 10/16/2021    Procedure: TRANSPLANT, LUNG, RECIPIENT, BILATERAL, Bronchoscopy, on-pump perfusion, bilateral clamshell sternotomy;  Surgeon: Yanick Corral MD;  [facility]    XR ACROMIOCLAVICULAR JOINT BILATERAL         Medications:   Current Outpatient Medications   Medication Sig Dispense Refill    acetaminophen (TYLENOL) 325 MG tablet 3 tablets (975 mg) by Oral or Feeding Tube route every 8 hours as needed for mild pain (Patient taking differently: Take 975 mg by mouth every 8 hours as needed for mild pain.) 100 tablet 11    amLODIPine (NORVASC) 10 MG tablet Take 1 tablet (10 mg) by mouth at bedtime. (Patient taking differently: Take 10 mg by mouth daily.) 30 tablet 11    Calcium Carbonate-Vitamin D (CALCIUM 600 +D HIGH POTENCY) 600-10 MG-MCG TABS 1 tablet by Oral or Feeding Tube route daily 30 tablet 11    calcium carbonate-vitamin  D (CALTRATE) 600-10 MG-MCG per tablet Take 1 tablet by mouth daily.      diclofenac (VOLTAREN) 1 % topical gel Apply 4 g topically 4 times daily Both hands 150 g 11    famotidine (PEPCID) 20 MG tablet Take 20 mg by mouth 2 times daily.      ferrous sulfate (FEROSUL) 325 (65 Fe) MG tablet Take 1 tablet (325 mg) by mouth daily (with breakfast) 90 tablet 3    finasteride (PROSCAR) 5 MG tablet Take 5 mg by mouth daily      FLUoxetine (PROZAC) 40 MG capsule Take 40 mg by mouth daily.      fluticasone-vilanterol (BREO ELLIPTA) 200-25 MCG/ACT inhaler Inhale 1 puff into the lungs daily. 28 each 11    hydroxychloroquine (PLAQUENIL) 200 MG tablet Take 1 tablet (200 mg) by mouth 2 times daily 180 tablet 3    insulin aspart (NOVOLOG PEN) 100 UNIT/ML pen Take 1-3 units TID if BS over 200. Max daily dose is 12 15 mL 11    insulin pen needle (32G X 4 MM) 32G X 4 MM miscellaneous Use 4 pen needles daily or as directed. 120 each 11    ipratropium - albuterol 0.5 mg/2.5 mg/3 mL (DUONEB) 0.5-2.5 (3) MG/3ML neb solution Take 1 vial (3 mLs) by nebulization every 6 hours as needed for shortness of breath, wheezing or cough. 90 mL 3    lamoTRIgine (LAMICTAL) 200 MG tablet Take by mouth daily. Total of 250mg daily      levalbuterol (XOPENEX HFA) 45 MCG/ACT inhaler INHALE 2 PUFFS BY MOUTH EVERY 4 HOURS AS NEEDED FOR WHEEZING FOR SHORTNESS OF BREATH (Patient taking differently: Inhale 2 puffs into the lungs every 4 hours as needed. INHALE 2 PUFFS BY MOUTH EVERY 4 HOURS AS NEEDED FOR WHEEZING FOR SHORTNESS OF BREATH) 15 g 0    levothyroxine (SYNTHROID/LEVOTHROID) 25 MCG tablet Take 1 tablet (25 mcg) by mouth daily. 30 tablet 11    magnesium glycinate 100 MG CAPS capsule Take 4 capsules (400 mg) by mouth 3 times daily      metoprolol succinate ER (TOPROL XL) 25 MG 24 hr tablet Take 25 mg by mouth daily.      mirtazapine (REMERON) 7.5 MG tablet Take 7.5 mg by mouth At Bedtime      multivitamin w/minerals (THERA-VIT-M) tablet Take 1 tablet by  mouth daily 30 tablet 11    mycophenolic acid (GENERIC EQUIVALENT) 180 MG EC tablet Take 1 tablet (180 mg) by mouth 2 times daily. 60 tablet 11    ondansetron (ZOFRAN ODT) 4 MG ODT tab Take 1 tablet (4 mg) by mouth every 6 hours as needed for nausea. 30 tablet 3    pantoprazole (PROTONIX) 40 MG EC tablet Take 1 tablet (40 mg) by mouth 2 times daily (before meals). 60 tablet 11    predniSONE (DELTASONE) 2.5 MG tablet Take 1 tablet (2.5 mg) by mouth every evening Total dose: 5mg in AM and 2.5 mg in PM 30 tablet 11    predniSONE (DELTASONE) 5 MG tablet Take 1 tablet (5 mg) by mouth every morning AND 0.5 tablets (2.5 mg) every evening. 135 tablet 3    propranolol ER (INDERAL LA) 60 MG 24 hr capsule Take 60 mg by mouth daily.      rOPINIRole (REQUIP) 0.25 MG tablet Take 0.25 mg by mouth At Bedtime      rosuvastatin (CRESTOR) 10 MG tablet Take 1 tablet (10 mg) by mouth daily. On hold 1/30/25 for rising LFTs      senna-docusate (SENOKOT-S/PERICOLACE) 8.6-50 MG tablet Take 2 tablets by mouth 2 times daily as needed for constipation. 120 tablet 11    sildenafil (VIAGRA) 100 MG tablet Take 100 mg by mouth daily as needed.      sulfamethoxazole-trimethoprim (BACTRIM) 400-80 MG tablet Take 1 tablet by mouth daily. 30 tablet 11    tacrolimus (ASTAGRAF XL) 0.5 MG 24 hr capsule Take 1 capsule (0.5 mg) by mouth daily. Total dose: 2.5mg daily 30 capsule 11    tacrolimus (ASTAGRAF XL) 1 MG 24 hr capsule Take 2 capsules (2 mg) by mouth daily. Total dose: 2.5mg daily 60 capsule 11    warfarin ANTICOAGULANT (COUMADIN) 1 MG tablet TAKE 4 TO 5 TABLETS BY MOUTH ONCE DAILY AS DIRECTED BY  THE  COUMADIN  CLINIC. 420 tablet 0    warfarin ANTICOAGULANT (COUMADIN) 1 MG tablet Take 4 tablets (4 mg) Sun, Wed, Fri; and 5 tablets (5 mg) all other days of the week or as directed by Anticoagulation Clinic. (Patient not taking: Reported on 2/18/2025) 385 tablet 1    zinc sulfate (ZINCATE) 220 (50 Zn) MG capsule Take 1 capsule (220 mg) by mouth daily.  "90 capsule 3       Allergies:   No Known Allergies    Social History     Tobacco Use    Smoking status: Never    Smokeless tobacco: Former    Tobacco comments:     Quit chewing over 20 years ago.   Substance Use Topics    Alcohol use: Never       Family History   Problem Relation Age of Onset    Diabetes Type 1 Mother     Heart Disease Mother     Chronic Obstructive Pulmonary Disease Mother     Parkinsonism Mother     Rheumatoid Arthritis Father     Emphysema Paternal Grandfather        Physical Examination:  BP (!) 162/89   Pulse 61   Ht 1.626 m (5' 4\")   Wt 73.9 kg (163 lb)   SpO2 94%   BMI 27.98 kg/m      Wt Readings from Last 6 Encounters:   02/18/25 69.9 kg (154 lb)   10/15/24 72.1 kg (158 lb 14.4 oz)   06/18/24 73.1 kg (161 lb 1.6 oz)   04/30/24 70.3 kg (155 lb)   02/13/24 70.3 kg (155 lb)   11/16/23 66.2 kg (146 lb)     General: Well appearing man in no distress, up and go gppd  Eyes: EOMI. Sclerae and conjunctivae are clear.     Labs and Studies:   Lab Results   Component Value Date    ERIK 9.6 02/18/2025    ERIK 9.2 10/15/2024    ERIK 9.2 06/18/2024    ERIK 9.1 02/13/2024    ALBUMIN 4.0 02/18/2025    ICAW 6.2 (H) 11/02/2021    ALT 79 (H) 02/18/2025    PHOS 3.8 02/18/2025    PTHI 72 (H) 11/14/2022    AST 44 02/18/2025    BILITOTAL 0.3 02/18/2025    CR 1.10 02/18/2025    CR 1.20 (H) 10/15/2024    CR 1.16 06/18/2024     02/18/2025    TSH 5.26 (H) 02/18/2025    ALKPHOS 73 02/18/2025    VITDT 29 02/18/2025    TESTOSTTOTAL 414 02/18/2025    HGB 13.6 02/18/2025       Lab Results   Component Value Date     02/18/2025    CHLORIDE 111 (H) 04/21/2025    CO2 31 (H) 02/18/2025    GLC 98 02/18/2025    CR 1.10 02/18/2025    CR 1.20 (H) 10/15/2024    CR 1.16 06/18/2024    CR 1.06 02/13/2024    CR 1.11 11/14/2023    ERIK 9.6 02/18/2025    MAG 1.5 (L) 02/18/2025    ALBUMIN 4.0 02/18/2025    ALKPHOS 73 02/18/2025    LDL 33 02/18/2025    HDL 41 02/18/2025    TRIG 356 (H) 02/18/2025    TSH 5.26 (H) 02/18/2025    TSH " "3.05 04/25/2023    TSH 3.17 11/19/2021     No results found for: \"MICROL\"  Lab Results   Component Value Date    A1C 5.7 (H) 02/18/2025    A1C 5.7 (H) 02/13/2024    A1C 6.1 (H) 11/14/2022    A1C 5.3 11/15/2021    A1C 6.6 (H) 09/07/2021       Lab Results   Component Value Date    HGB 13.6 02/18/2025     Results for orders placed in visit on 02/18/25    DX Hip/Pelvis/Spine w Lat Fraction Sarah    Narrative  Images from the original result were not included.    [facility] - [address] [address]  Phone: [phone]   Fax: [phone]        Impression  The most negative and valid T-score of -2.2 at the level of the right femoral neck corresponds with low bone density according to WHO criteria for postmenopausal females and men age 50 and over.    Results  Lumbar spine L1-L2  T-score -1.1 , BMD 1.0.63 g/cm2.    Left Femoral  neck  T-score -2.0 , BMD 0.814 g/cm2.  Left Total hip  T-score -1.8 , BMD 0.849 g/cm2.    Right Femoral neck  T-score -2.2, BMD  0.786 g/cm2.  Right Total hip  T-score -2.0 , BMD  0.810 g/cm2.    Interval change  Bone density compared to the prior study has changed at L1-2 by +12.6%, at the left total hip by +4.4%.  Ref 3    Percent changes not mentioned or within remaining regions are insignificant    Please note that the differential diagnosis of BMD increase in the spine includes improvement due to pharmacotherapy vs inter-current progression of spine degeneration or fracture    Fracture risk  The estimated 10-year risk for a major osteoporotic fracture is 15.3% and for a hip fracture is 3.5%. FRAX was calculated based on information provided by the patient on the DXA questionnaire.  FRAX may not accurately predict risk in patient on bisphosphonate and should not be used to assess the reduction in fracture risk in patients on treatment  The risk of osteoporotic fracture increases approximately 2-fold for each 1.0 SD decrease in " T-score.  Low bone density is not the only risk factor for fracture; consider factors such as patient's age, fall risk, injury risk, previous osteoporotic fracture, family history of osteoporosis, etc.    Repeat  For patients eligible for Medicare, routine testing is allowed once every 2 years.  Testing frequency can be increased for patients on corticosteroids.    Clinical correlation recommended    Technical quality  Satisfactory.  Abnormal vertebrae may be excluded from analysis if there is more than a 1.0 T-score difference between the vertebrae in question and adjacent vertebrae. Note the wide range in BMD values and T-scores within the lumbar spine. In the circumstances, the lumbar spine is represented by L1-L2      Principal result :  Roseann Moreno MD, [facility]  Division of Endocrinology and Diabetes  Department of Medicine      References:  Ref. 1. WHO categories:  T-score > -1.0  = normal             .  T-score -1.0 to -2.5 = low bone density  T-score < -2.5 = osteoporosis        .    Ref. 2. 2015 ISCD official position statements:  www.iscd.org.    Ref. 3.  Today's examination is compared to the technically similar prior study of the total hip and femur if available. Only changes deemed likely to be significant based on historical data are reported.  According to the ISCD position statements, total hip rather than femoral neck regions are to be compared because larger areas give better precision.  LSC = least significant changes at the [facility] (historical data)  AP spine =  0.032 g/cm2  (6/26/2007).  Precision assessment for combinations of fewer than 4 vertebrae cannot presently be calculated  Left hip = 0.029 g/cm2  (6/15/2007)  Right hip = 0.018 g/cm2  (6/15/2007)  Left mid radius = 0.043 g/cm2  (6/26/2007)  Bilateral hip LSC is not known  Please note that the differential diagnosis of increase in bone density at the lumbar spine includes improvement due to pharmacotherapy vs  inter-current progression of spine degeneration or fracture.    Ref. 4 Fracture risk is calculated in patients aged 40 to 90 years old with low bone density not on osteoporosis treatment. A 10 year fracture risk of 3% and higher for hip fracture and 20% and higher for major osteoporotic fracture is considered higher than acceptable risk and might be an indication for medical treatment.    Ref. 5.  By definition, osteoporosis may be diagnosed in the presence or with the history of a low trauma or fragility fracture.  Fragility and low trauma fracture is defined as a fracture resulting from the force of a fall from a standing height or less or a bone that breaks under conditions that would not cause a normal bone to break.    Ref. 6. NOF Physician's Guideline Website address:  www.nof.org.

## 2025-04-07 NOTE — PROGRESS NOTES
ANTICOAGULATION MANAGEMENT     Edson Thornton Jr. 59 year old male is on warfarin with subtherapeutic INR result. (Goal INR 2.0-3.0)    Recent labs: (last 7 days)     04/07/25  0720   INR 1.9*       ASSESSMENT     Source(s): Chart Review and Patient/Caregiver Call     Warfarin doses taken: Warfarin taken as instructed  Diet: No new diet changes identified  Medication/supplement changes: None noted  New illness, injury, or hospitalization: No  Signs or symptoms of bleeding or clotting: No  Previous result: Subtherapeutic-5% maintenance dose increase last encounter  Additional findings: None       PLAN     Recommended plan for ongoing change(s) affecting INR     Dosing Instructions: Increase your warfarin dose (4.3% change) with next INR in 1 week       Summary  As of 4/7/2025      Full warfarin instructions:  4 mg every Mon, Wed, Fri; 3 mg all other days   Next INR check:  4/14/2025               Telephone call with Bret who agrees to plan and repeated back plan correctly    Patient using outside facility for labs    Education provided: Goal range and lab monitoring: goal range and significance of current result and Importance of following up at instructed interval  Contact [phone] with any changes, questions or concerns.     Plan made per [facility] anticoagulation protocol    ESSIE COLÓN RN  4/7/2025  Anticoagulation Clinic  iConclude for routing messages: p [facility] patient phone line: [phone]        _______________________________________________________________________     Anticoagulation Episode Summary       Current INR goal:  2.0-3.0   TTR:  47.5% (1 y)   Target end date:  Indefinite   Send INR reminders to:  [facility]    Indications    Atrial fibrillation  unspecified type (H) [I48.91]  Encounter for long-term (current) use of high-risk medication [Z79.899]  Immunosuppressed status [D84.9]  S/P lung transplant (H) [Z94.2]  Ischemic stroke (H) [I63.9]             Comments:  --              Anticoagulation Care Providers       Provider Role Specialty Phone number    Abiodun Woods MD Referring Pulmonary Disease [phone]                 No

## 2025-04-14 ENCOUNTER — ANTICOAGULATION THERAPY VISIT (OUTPATIENT)
Dept: ANTICOAGULATION | Facility: CLINIC | Age: 60
End: 2025-04-14
Payer: COMMERCIAL

## 2025-04-14 DIAGNOSIS — I63.9 ISCHEMIC STROKE (H): ICD-10-CM

## 2025-04-14 DIAGNOSIS — Z94.2 S/P LUNG TRANSPLANT (H): Chronic | ICD-10-CM

## 2025-04-14 DIAGNOSIS — I48.91 ATRIAL FIBRILLATION, UNSPECIFIED TYPE (H): Primary | ICD-10-CM

## 2025-04-14 DIAGNOSIS — D84.9 IMMUNOSUPPRESSED STATUS: ICD-10-CM

## 2025-04-14 DIAGNOSIS — Z79.899 ENCOUNTER FOR LONG-TERM (CURRENT) USE OF HIGH-RISK MEDICATION: ICD-10-CM

## 2025-04-14 LAB — INR (EXTERNAL): 2.1

## 2025-04-14 NOTE — PROGRESS NOTES
ANTICOAGULATION MANAGEMENT     Edson Thornton Jr. 59 year old male is on warfarin with therapeutic INR result. (Goal INR 2.0-3.0)    Recent labs: (last 7 days)     04/14/25  0000   INR 2.1       ASSESSMENT     Source(s): Chart Review  Previous INR was Subtherapeutic  Medication, diet, health changes since last INR chart reviewed; none identified         PLAN     Recommended plan for no diet, medication or health factor changes affecting INR     Dosing Instructions: Continue your current warfarin dose with next INR in 1 week       Summary  As of 4/14/2025      Full warfarin instructions:  4 mg every Mon, Wed, Fri; 3 mg all other days   Next INR check:  4/21/2025               Detailed voice message left for Bret with dosing instructions and follow up date.   Sent [employer] message with dosing and follow up instructions    Patient using outside facility for labs    Education provided: Please call back if any changes to your diet, medications or how you've been taking warfarin    Plan made per [facility] anticoagulation protocol    Lydia Vital RN  4/14/2025  Anticoagulation Clinic  [employer] for routing messages: p [facility] patient phone line: [phone]        _______________________________________________________________________     Anticoagulation Episode Summary       Current INR goal:  2.0-3.0   TTR:  48.4% (1 y)   Target end date:  Indefinite   Send INR reminders to:  [facility]    Indications    Atrial fibrillation  unspecified type (H) [I48.91]  Encounter for long-term (current) use of high-risk medication [Z79.899]  Immunosuppressed status [D84.9]  S/P lung transplant (H) [Z94.2]  Ischemic stroke (H) [I63.9]             Comments:  --             Anticoagulation Care Providers       Provider Role Specialty Phone number    Abiodun Woods MD Referring Pulmonary Disease [phone]

## 2025-04-16 DIAGNOSIS — I48.91 ATRIAL FIBRILLATION, UNSPECIFIED TYPE (H): ICD-10-CM

## 2025-04-17 RX ORDER — WARFARIN SODIUM 1 MG/1
TABLET ORAL
Qty: 420 TABLET | Refills: 0 | Status: SHIPPED | OUTPATIENT
Start: 2025-04-17

## 2025-04-21 ENCOUNTER — TELEPHONE (OUTPATIENT)
Dept: TRANSPLANT | Facility: CLINIC | Age: 60
End: 2025-04-21
Payer: COMMERCIAL

## 2025-04-21 ENCOUNTER — DOCUMENTATION ONLY (OUTPATIENT)
Dept: ANTICOAGULATION | Facility: CLINIC | Age: 60
End: 2025-04-21

## 2025-04-21 ENCOUNTER — LAB (OUTPATIENT)
Dept: LAB | Facility: CLINIC | Age: 60
End: 2025-04-21
Payer: COMMERCIAL

## 2025-04-21 ENCOUNTER — TELEPHONE (OUTPATIENT)
Dept: ENDOCRINOLOGY | Facility: CLINIC | Age: 60
End: 2025-04-21
Payer: COMMERCIAL

## 2025-04-21 ENCOUNTER — ANTICOAGULATION THERAPY VISIT (OUTPATIENT)
Dept: ANTICOAGULATION | Facility: CLINIC | Age: 60
End: 2025-04-21
Payer: COMMERCIAL

## 2025-04-21 DIAGNOSIS — Z94.2 S/P LUNG TRANSPLANT (H): Primary | ICD-10-CM

## 2025-04-21 DIAGNOSIS — Z94.2 S/P LUNG TRANSPLANT (H): Chronic | ICD-10-CM

## 2025-04-21 DIAGNOSIS — I48.91 ATRIAL FIBRILLATION, UNSPECIFIED TYPE (H): Primary | ICD-10-CM

## 2025-04-21 DIAGNOSIS — Z94.2 S/P LUNG TRANSPLANT (H): ICD-10-CM

## 2025-04-21 DIAGNOSIS — I63.9 ISCHEMIC STROKE (H): ICD-10-CM

## 2025-04-21 DIAGNOSIS — D84.9 IMMUNOSUPPRESSED STATUS: ICD-10-CM

## 2025-04-21 DIAGNOSIS — Z94.2 LUNG REPLACED BY TRANSPLANT (H): ICD-10-CM

## 2025-04-21 DIAGNOSIS — Z79.899 ENCOUNTER FOR LONG-TERM (CURRENT) USE OF HIGH-RISK MEDICATION: ICD-10-CM

## 2025-04-21 DIAGNOSIS — I48.91 ATRIAL FIBRILLATION, UNSPECIFIED TYPE (H): ICD-10-CM

## 2025-04-21 DIAGNOSIS — D84.9 IMMUNOSUPPRESSED STATUS: Primary | ICD-10-CM

## 2025-04-21 DIAGNOSIS — R76.8 LOW IMMUNOGLOBULIN LEVEL: ICD-10-CM

## 2025-04-21 LAB — INR (EXTERNAL): 1.6

## 2025-04-21 PROCEDURE — 80197 ASSAY OF TACROLIMUS: CPT

## 2025-04-21 RX ORDER — EPINEPHRINE 1 MG/ML
0.3 INJECTION, SOLUTION, CONCENTRATE INTRAVENOUS EVERY 5 MIN PRN
OUTPATIENT
Start: 2025-06-02

## 2025-04-21 RX ORDER — ALBUTEROL SULFATE 0.83 MG/ML
2.5 SOLUTION RESPIRATORY (INHALATION)
OUTPATIENT
Start: 2025-06-02

## 2025-04-21 RX ORDER — ZOLEDRONIC ACID 0.05 MG/ML
5 INJECTION, SOLUTION INTRAVENOUS ONCE
Start: 2025-06-02

## 2025-04-21 RX ORDER — METHYLPREDNISOLONE SODIUM SUCCINATE 40 MG/ML
40 INJECTION INTRAMUSCULAR; INTRAVENOUS
Start: 2025-06-02

## 2025-04-21 RX ORDER — HEPARIN SODIUM (PORCINE) LOCK FLUSH IV SOLN 100 UNIT/ML 100 UNIT/ML
5 SOLUTION INTRAVENOUS
OUTPATIENT
Start: 2025-06-02

## 2025-04-21 RX ORDER — DIPHENHYDRAMINE HYDROCHLORIDE 50 MG/ML
25 INJECTION, SOLUTION INTRAMUSCULAR; INTRAVENOUS
Start: 2025-06-02

## 2025-04-21 RX ORDER — ALBUTEROL SULFATE 90 UG/1
1-2 INHALANT RESPIRATORY (INHALATION)
Start: 2025-06-02

## 2025-04-21 RX ORDER — HEPARIN SODIUM,PORCINE 10 UNIT/ML
5-20 VIAL (ML) INTRAVENOUS DAILY PRN
OUTPATIENT
Start: 2025-06-02

## 2025-04-21 RX ORDER — DIPHENHYDRAMINE HYDROCHLORIDE 50 MG/ML
50 INJECTION, SOLUTION INTRAMUSCULAR; INTRAVENOUS
Start: 2025-06-02

## 2025-04-21 NOTE — TELEPHONE ENCOUNTER
Reclast infusion 5/25     Billie Huntley MD  Staff Physician  Division of Endocrinology  [facility]  Pager #[phone]

## 2025-04-21 NOTE — LETTER
PHYSICIAN ORDERS      DATE & TIME ISSUED: 2025 8:52 AM  PATIENT NAME: Edson Thornton Jr.   : 1965     [facility] MR# [if applicable]: [id number]     DIAGNOSIS:  Lung Transplant  Z94.2    Please draw a IgG today.    Any questions please call: [place] [phone]    Please fax these results to [phone].      .  Abiodun Woods MD  Division of Pulmonary, Allergy, Critical Care and Sleep Medicine  Professor of Medicine

## 2025-04-21 NOTE — PROGRESS NOTES
ANTICOAGULATION CLINIC REFERRAL RENEWAL REQUEST       An annual renewal order is required for all patients referred to [facility] Anticoagulation Clinic.?  Please review and sign the pended referral order for Edson Thornton Jr..       ANTICOAGULATION SUMMARY      Warfarin indication(s)   Atrial Fibrillation    Mechanical heart valve present?  NO       Current goal range   INR: 2.0-3.0     Goal appropriate for indication? Goal INR 2-3, standard for indication(s) above     Time in Therapeutic Range (TTR)  (Goal > 60%) NO - 48.8%       Office visit with referring provider's group within last year yes on 2/18/25       Once orders signed, fax to [facility] Fax [phone]     Margoth Victoria RN  [facility] Anticoagulation Clinic

## 2025-04-21 NOTE — PROGRESS NOTES
ANTICOAGULATION MANAGEMENT     Edson Thornton Jr. 59 year old male is on warfarin with subtherapeutic INR result. (Goal INR 2.0-3.0)    Recent labs: (last 7 days)     04/21/25  0919   INR 1.6       ASSESSMENT     Source(s): Chart Review and Patient/Caregiver Call     Warfarin doses taken: Warfarin taken as instructed  Diet: No new diet changes identified  Medication/supplement changes:  Tacrolimus changed recently to Astagraf XL.  New illness, injury, or hospitalization: No  Signs or symptoms of bleeding or clotting: No  Previous result: Therapeutic last visit; previously outside of goal range  Additional findings:  Confirmed recently had 1 mg pink Warfarin tablets refilled.       PLAN     Recommended plan for no diet, medication or health factor changes affecting INR     Dosing Instructions: Increase your warfarin dose (12.5% change) with next INR in 1 week       Summary  As of 4/21/2025      Full warfarin instructions:  3 mg every Sat; 4 mg all other days   Next INR check:  4/28/2025               Telephone call with Bret who verbalizes understanding and agrees to plan  Sent [employer] message with dosing and follow up instructions    Patient using outside facility for labs    Education provided: Taking warfarin: prescribed tablet strength and color  Goal range and lab monitoring: goal range and significance of current result  Interaction IS NOT anticipated between warfarin and Astagraf XL  Symptom monitoring: monitoring for bleeding signs and symptoms and monitoring for clotting signs and symptoms  Importance of notifying anticoagulation clinic for: if you did not receive dosing instructions on the same day as your labs were checked  Written instructions provided  Contact [phone] with any changes, questions or concerns.     Plan made per [facility] anticoagulation protocol    Margoth Victoria RN  4/21/2025  Anticoagulation Clinic  [employer] for routing messages: [facility]  [facility] patient phone line:  [phone]        _______________________________________________________________________     Anticoagulation Episode Summary       Current INR goal:  2.0-3.0   TTR:  48.8% (1 y)   Target end date:  Indefinite   Send INR reminders to:  [facility]    Indications    Atrial fibrillation  unspecified type (H) [I48.91]  Encounter for long-term (current) use of high-risk medication [Z79.899]  Immunosuppressed status [D84.9]  S/P lung transplant (H) [Z94.2]  Ischemic stroke (H) [I63.9]             Comments:  --             Anticoagulation Care Providers       Provider Role Specialty Phone number    Abiodun Woods MD Referring Pulmonary Disease [phone]

## 2025-04-21 NOTE — TELEPHONE ENCOUNTER
Please fax new/updated order for IGG level check.  [facility] fax# [phone]  Patient is at lab right now.

## 2025-04-24 ENCOUNTER — TELEPHONE (OUTPATIENT)
Dept: TRANSPLANT | Facility: CLINIC | Age: 60
End: 2025-04-24
Payer: COMMERCIAL

## 2025-04-24 DIAGNOSIS — Z94.2 S/P LUNG TRANSPLANT (H): ICD-10-CM

## 2025-04-24 DIAGNOSIS — D80.1 HYPOGAMMAGLOBULINEMIA: Primary | ICD-10-CM

## 2025-04-24 LAB
TACROLIMUS BLD-MCNC: 3.3 UG/L (ref 5–15)
TME LAST DOSE: ABNORMAL H
TME LAST DOSE: ABNORMAL H

## 2025-04-24 RX ORDER — HYDROCORTISONE SODIUM SUCCINATE 100 MG/2ML
100 INJECTION INTRAMUSCULAR; INTRAVENOUS ONCE
OUTPATIENT
Start: 2025-04-24

## 2025-04-24 RX ORDER — ALBUTEROL SULFATE 0.83 MG/ML
2.5 SOLUTION RESPIRATORY (INHALATION)
OUTPATIENT
Start: 2025-04-24

## 2025-04-24 RX ORDER — DIPHENHYDRAMINE HYDROCHLORIDE 50 MG/ML
50 INJECTION, SOLUTION INTRAMUSCULAR; INTRAVENOUS
Start: 2025-04-24

## 2025-04-24 RX ORDER — MEPERIDINE HYDROCHLORIDE 25 MG/ML
25 INJECTION INTRAMUSCULAR; INTRAVENOUS; SUBCUTANEOUS
OUTPATIENT
Start: 2025-04-24

## 2025-04-24 RX ORDER — HEPARIN SODIUM (PORCINE) LOCK FLUSH IV SOLN 100 UNIT/ML 100 UNIT/ML
5 SOLUTION INTRAVENOUS
OUTPATIENT
Start: 2025-04-24

## 2025-04-24 RX ORDER — DIPHENHYDRAMINE HCL 25 MG
50 CAPSULE ORAL ONCE
OUTPATIENT
Start: 2025-04-24

## 2025-04-24 RX ORDER — METHYLPREDNISOLONE SODIUM SUCCINATE 40 MG/ML
40 INJECTION INTRAMUSCULAR; INTRAVENOUS
Start: 2025-04-24

## 2025-04-24 RX ORDER — HEPARIN SODIUM,PORCINE 10 UNIT/ML
5-20 VIAL (ML) INTRAVENOUS DAILY PRN
OUTPATIENT
Start: 2025-04-24

## 2025-04-24 RX ORDER — ACETAMINOPHEN 325 MG/1
650 TABLET ORAL ONCE
Start: 2025-04-24

## 2025-04-24 RX ORDER — EPINEPHRINE 1 MG/ML
0.3 INJECTION, SOLUTION, CONCENTRATE INTRAVENOUS EVERY 5 MIN PRN
OUTPATIENT
Start: 2025-04-24

## 2025-04-24 RX ORDER — DIPHENHYDRAMINE HYDROCHLORIDE 50 MG/ML
25 INJECTION, SOLUTION INTRAMUSCULAR; INTRAVENOUS
Start: 2025-04-24

## 2025-04-24 RX ORDER — ALBUTEROL SULFATE 90 UG/1
1-2 INHALANT RESPIRATORY (INHALATION)
Start: 2025-04-24

## 2025-04-29 ENCOUNTER — OFFICE VISIT (OUTPATIENT)
Dept: ENDOCRINOLOGY | Facility: CLINIC | Age: 60
End: 2025-04-29
Payer: COMMERCIAL

## 2025-04-29 VITALS
SYSTOLIC BLOOD PRESSURE: 162 MMHG | DIASTOLIC BLOOD PRESSURE: 89 MMHG | WEIGHT: 163 LBS | HEIGHT: 64 IN | OXYGEN SATURATION: 94 % | HEART RATE: 61 BPM | BODY MASS INDEX: 27.83 KG/M2

## 2025-04-29 DIAGNOSIS — M81.8 OTHER OSTEOPOROSIS WITHOUT CURRENT PATHOLOGICAL FRACTURE: Primary | ICD-10-CM

## 2025-04-29 PROCEDURE — 1126F AMNT PAIN NOTED NONE PRSNT: CPT | Performed by: INTERNAL MEDICINE

## 2025-04-29 PROCEDURE — 3077F SYST BP >= 140 MM HG: CPT | Performed by: INTERNAL MEDICINE

## 2025-04-29 PROCEDURE — 3079F DIAST BP 80-89 MM HG: CPT | Performed by: INTERNAL MEDICINE

## 2025-04-29 PROCEDURE — 99214 OFFICE O/P EST MOD 30 MIN: CPT | Performed by: INTERNAL MEDICINE

## 2025-04-29 PROCEDURE — G2211 COMPLEX E/M VISIT ADD ON: HCPCS | Performed by: INTERNAL MEDICINE

## 2025-04-29 ASSESSMENT — PAIN SCALES - GENERAL: PAINLEVEL_OUTOF10: NO PAIN (0)

## 2025-04-29 NOTE — NURSING NOTE
"Chief Complaint   Patient presents with    Osteoporosis    Endocrine Problem    Diabetes     Vital signs:      BP: (!) 162/89 Pulse: 61     SpO2: 94 %     Height: 162.6 cm (5' 4\") Weight: 73.9 kg (163 lb)  Estimated body mass index is 27.98 kg/m  as calculated from the following:    Height as of this encounter: 1.626 m (5' 4\").    Weight as of this encounter: 73.9 kg (163 lb).        "

## 2025-04-29 NOTE — LETTER
4/29/2025       RE: Edson Thornton Jr.  [address]  [place] [address]     Dear Colleague,    Thank you for referring your patient, Edson Thornton Jr., to the [facility] ENDOCRINOLOGY [facility] [place] at [facility]. Please see a copy of my visit note below.    04/07/25 12:41 PM  PATIENT LAB/IMAGING STATUS : Orders completed / No further action needed       04/17/24 2:22 PM  PATIENT LAB/IMAGING STATUS : No pending lab orders           Endocrinology and Diabetes Clinic         Follow up for: Osteoporosis    Assessment:  59 y old male with s/p lung transplant with osteoporosis.  T score of -2.7 at the lumbar spine (L2-4) with risk factor of s/p lung transplant, chronic steroid treatment, rheumatoid arthritis, hypovitaminosis D, possibly hypogonadism, comorbidity of hyperparathyroidism, diabetes, hypothyroidism, history of stroke    Osteoporosis  Patient with osteoporosis.  T-score improved on recent bone density which is very reassuring.  Will continue with Reclast infusions.  Patient is scheduled for third infusion in June 2025 with goal of total of 6 infusions reviewed in detail   2 separate tooth extraction from Reclast infusions and preferably  Prior to the Reclast infusion assuring that dental work is healed which typically takes 4 to 6 weeks.    Vitamin D is borderline, recommended to take additional 800 or 1000 units/day    Subclinical hypothyroidism  Patient is on small dose of levothyroxine with recent TSH borderline.  Patient is taking the medication to get her with calcium and likely not absorbing it.  Discussed today to discontinue levothyroxine and follow hormone levels.  Patient does not need to start levothyroxine for subclinical hypothyroidism.  Will follow TSH level.  Due to need for calcium supplementation and iron supplementation taking levothyroxine properly does present an extra burden to the patient    Plan:   Add vitamin D 800  over 1000 units/day  Cont Reclast infusion #3 soon in 6/2025  Cont Calcium and Vitamin D supplements    Cont diet rich in Calcium  DXA in 2025    The  Following reports and notes were reviewed: recent DXA scan, labs       The Longitudinal plan of care for osteoporosis condition was addressed during this visit. Due to added complexity of care, we will continue to support Edson , and the subsequent management of this condition(s) and with the ongoing continuity of care of this condition.    Billie Huntley MD  Endocrinology and Diabetes  Telephone contact:  [facility] [place] [phone]  [facility] [phone]      Interval history  1 year follow up     Pt has had fall with tripping outside continues to struggle with incomplete field of vision,     Most recent DXA 2/18/2025 improvement in bone density at hip and spine with lowest T-score of -2.2 at the right femoral neck     Prior fractures no  Height loss no    Osteoporosis medications 6/2024, 6/2023 Reclast infusion, tolerating it well    Dietary Calcium cheese some nuts ome bean green  Calcium supplements Calcium once MVM  Vitamin D supplements  with MVM and Calcium.  Alcohol consumption no  Smoking no    Exercise walking, some yard work.    Dental status has seen dentist, had dental cleaning, is going to have another tooth pulled    Current Problem List:   Patient Active Problem List   Diagnosis     S/P lung transplant (H)     BRENNAN (acute kidney injury)     Shock liver     Ischemic stroke (H)     Atrial fibrillation, unspecified type (H)     Encounter for long-term (current) use of high-risk medication     Immunosuppressed status     Primary hypertension     Pulmonary air trapping     Hypomagnesemia     Bronchial hemorrhage     Pulmonary embolism (H)     Osteoporosis     Clinical diagnosis of COVID-19     Low immunoglobulin level     Aspergillus (H)     Lung infection     Tremor     Acute cough     Cough,  unspecified     Snoring     Adjustment disorder with anxiety     Adjustment disorder with mixed disturbance of emotions and conduct     Anemia     Anxiety disorder, unspecified     Anxiety     Generalized anxiety disorder     Benign prostatic hyperplasia     Chronic kidney disease, stage 3 unspecified (H)     Chronic kidney disease     Depression, unspecified     Dysthymic disorder     Other specified depressive episodes     Duodenal anomaly     Exposure to potentially hazardous substance     Fever, unspecified     Gastroesophageal reflux disease     Hyperlipidemia     Hypogonadism, male     Hypothyroidism     Immunocompromised state     Immunodeficiency, unspecified     Long-term use of high-risk medication     Major depressive disorder, recurrent episode, in full remission     Obstructive sleep apnea (adult) (pediatric)     Hypersomnia, unspecified     Insomnia, unspecified     Other insomnia     Other sequelae of cerebral infarction     Other specified congenital malformations of intestine     Other specified soft tissue disorders     Personal history of other diseases of the circulatory system     Presence of transplanted lung (H)     Primary osteoarthritis involving multiple joints     Problems in relationship with spouse or partner     Restless leg syndrome     Rheumatoid arthritis with rheumatoid factor of multiple sites without organ or systems involvement (H)     Seropositive rheumatoid arthritis (H)     Rheumatoid arthritis, unspecified (H)     Shortness of breath     Steroid-induced diabetes mellitus     Suicidal ideations     Type 2 diabetes mellitus without complications (H)     Unspecified hearing loss, bilateral     Unspecified abdominal pain     Unspecified visual disturbance     Urinary incontinence     Paroxysmal atrial fibrillation (H)     COVID-19     Long term (current) use of systemic steroids     ILD (interstitial lung disease) (H)     Rheumatoid lung disease (H)     Cerebrovascular accident  (CVA) (H)     Essential hypertension     History of lung transplant (H)     Encounter for aftercare following lung transplant (H)     Lung transplant status (H)     CAP (community acquired pneumonia)     Family history of Parkinson disease     Hypogammaglobulinemia       Past Medical and Past Surgical History:  Past Medical History:   Diagnosis Date     BRENNAN (acute kidney injury) 10/17/2021     Anxiety      Aspergillus (H) 03/08/2023     Depression      Family history of Parkinson disease 02/19/2025     HLD (hyperlipidemia)      HTN (hypertension)      Hypothyroidism      ILD (interstitial lung disease) (H)      Infection due to 2019 novel coronavirus 03/2023     Infection due to 2019 novel coronavirus 11/2023    Treated with remdesivir     Lung infection 07/12/2023     SALTY on CPAP      Oxygen dependent     BL 4L since ~6/2021     Primary hypertension 02/28/2022     Rheumatoid arthritis (H)     signs ~5/2020, dx 5/2021     S/P lung transplant (H) 10/16/2021     Shock liver 10/17/2021     Steroid-induced hyperglycemia      Traction bronchiectasis (H)      Tremor 01/08/2025       Past Surgical History:   Procedure Laterality Date     BRONCHOSCOPY (RIGID OR FLEXIBLE), DIAGNOSTIC N/A 1/26/2022    Procedure: BRONCHOSCOPY, WITH BRONCHOALVEOLAR LAVAGE AND BIOPSY;  Surgeon: Perlman, David Morris, MD;  Location: [facility]     BRONCHOSCOPY (RIGID OR FLEXIBLE), DIAGNOSTIC N/A 4/19/2022    Procedure: BRONCHOSCOPY, WITH BRONCHOALVEOLAR LAVAGE AND BIOPSY;  Surgeon: Sherine Merrill MD;  Location: [facility]     BRONCHOSCOPY (RIGID OR FLEXIBLE), DIAGNOSTIC N/A 6/21/2022    Procedure: BRONCHOSCOPY, WITH BRONCHOALVEOLAR LAVAGE AND BIOPSY;  Surgeon: Aneesh Rubio MD;  Location: [facility]     BRONCHOSCOPY FLEXIBLE AND RIGID N/A 11/15/2023    Procedure: Surveillance Bronchoscopy with airway check;  Surgeon: Ron Daniels MD;  Location: [facility]     COLONOSCOPY W/ BIOPSIES AND POLYPECTOMY  07/21/2020     CV CORONARY ANGIOGRAM N/A 09/08/2021     Procedure: Coronary Angiogram with possible intervention;  Surgeon: Jovon Bullock MD;  Location:  HEART CARDIAC CATH LAB     CV RIGHT HEART CATH MEASUREMENTS RECORDED N/A 09/08/2021    Procedure: Right Heart Cath;  Surgeon: Jovon Bullock MD;  Location:  HEART CARDIAC CATH LAB     ESOPHAGOSCOPY, GASTROSCOPY, DUODENOSCOPY (EGD), COMBINED N/A 10/23/2021    Procedure: ESOPHAGOGASTRODUODENOSCOPY (EGD);  Surgeon: Miquel Pisano MD;  Location:  GI     ESOPHAGOSCOPY, GASTROSCOPY, DUODENOSCOPY (EGD), COMBINED N/A 11/02/2021    Procedure: ESOPHAGOGASTRODUODENOSCOPY (EGD);  Surgeon: Daniel Ortiz MD;  Location:  GI     ESOPHAGOSCOPY, GASTROSCOPY, DUODENOSCOPY (EGD), COMBINED N/A 11/5/2021    Procedure: ESOPHAGOGASTRODUODENOSCOPY (EGD);  Surgeon: Ronnell Hernandez MD;  Location:  GI     EXTRACTION(S) DENTAL N/A 09/22/2021    Procedure: EXTRACTION tooth #19;  Surgeon: Deepak Tobin DDS;  Location: [facility]     HERNIA REPAIR       IR CHEST TUBE PLACEMENT NON-TUNNELLED LEFT  11/03/2021     PICC TRIPLE LUMEN PLACEMENT Left 11/04/2021    5FR TL PICC. Right non occlusive thrombus subclavian vein.     REPLACE GASTROJEJUNOSTOMY TUBE, PERCUTANEOUS N/A 11/9/2021    Procedure: REPLACEMENT, GASTROJEJUNOSTOMY TUBE, PERCUTANEOUS;  Surgeon: Hernando Rodriguez MD;  [facility]     right acl       TRACHEOSTOMY PERCUTANEOUS N/A 10/29/2021    Procedure: Percutaneous Tracheostomy,;  Surgeon: Celine Jenkins MD;  Location: [facility]     TRANSPLANT LUNG RECIPIENT SINGLE X2 Bilateral 10/16/2021    Procedure: TRANSPLANT, LUNG, RECIPIENT, BILATERAL, Bronchoscopy, on-pump perfusion, bilateral clamshell sternotomy;  Surgeon: Yanick Corral MD;  Location: [facility]     XR ACROMIOCLAVICULAR JOINT BILATERAL         Medications:   Current Outpatient Medications   Medication Sig Dispense Refill     acetaminophen (TYLENOL) 325 MG tablet 3 tablets (975 mg) by Oral or Feeding Tube route every 8 hours  as needed for mild pain (Patient taking differently: Take 975 mg by mouth every 8 hours as needed for mild pain.) 100 tablet 11     amLODIPine (NORVASC) 10 MG tablet Take 1 tablet (10 mg) by mouth at bedtime. (Patient taking differently: Take 10 mg by mouth daily.) 30 tablet 11     Calcium Carbonate-Vitamin D (CALCIUM 600 +D HIGH POTENCY) 600-10 MG-MCG TABS 1 tablet by Oral or Feeding Tube route daily 30 tablet 11     calcium carbonate-vitamin D (CALTRATE) 600-10 MG-MCG per tablet Take 1 tablet by mouth daily.       diclofenac (VOLTAREN) 1 % topical gel Apply 4 g topically 4 times daily Both hands 150 g 11     famotidine (PEPCID) 20 MG tablet Take 20 mg by mouth 2 times daily.       ferrous sulfate (FEROSUL) 325 (65 Fe) MG tablet Take 1 tablet (325 mg) by mouth daily (with breakfast) 90 tablet 3     finasteride (PROSCAR) 5 MG tablet Take 5 mg by mouth daily       FLUoxetine (PROZAC) 40 MG capsule Take 40 mg by mouth daily.       fluticasone-vilanterol (BREO ELLIPTA) 200-25 MCG/ACT inhaler Inhale 1 puff into the lungs daily. 28 each 11     hydroxychloroquine (PLAQUENIL) 200 MG tablet Take 1 tablet (200 mg) by mouth 2 times daily 180 tablet 3     insulin aspart (NOVOLOG PEN) 100 UNIT/ML pen Take 1-3 units TID if BS over 200. Max daily dose is 12 15 mL 11     insulin pen needle (32G X 4 MM) 32G X 4 MM miscellaneous Use 4 pen needles daily or as directed. 120 each 11     ipratropium - albuterol 0.5 mg/2.5 mg/3 mL (DUONEB) 0.5-2.5 (3) MG/3ML neb solution Take 1 vial (3 mLs) by nebulization every 6 hours as needed for shortness of breath, wheezing or cough. 90 mL 3     lamoTRIgine (LAMICTAL) 200 MG tablet Take by mouth daily. Total of 250mg daily       levalbuterol (XOPENEX HFA) 45 MCG/ACT inhaler INHALE 2 PUFFS BY MOUTH EVERY 4 HOURS AS NEEDED FOR WHEEZING FOR SHORTNESS OF BREATH (Patient taking differently: Inhale 2 puffs into the lungs every 4 hours as needed. INHALE 2 PUFFS BY MOUTH EVERY 4 HOURS AS NEEDED FOR  WHEEZING FOR SHORTNESS OF BREATH) 15 g 0     levothyroxine (SYNTHROID/LEVOTHROID) 25 MCG tablet Take 1 tablet (25 mcg) by mouth daily. 30 tablet 11     magnesium glycinate 100 MG CAPS capsule Take 4 capsules (400 mg) by mouth 3 times daily       metoprolol succinate ER (TOPROL XL) 25 MG 24 hr tablet Take 25 mg by mouth daily.       mirtazapine (REMERON) 7.5 MG tablet Take 7.5 mg by mouth At Bedtime       multivitamin w/minerals (THERA-VIT-M) tablet Take 1 tablet by mouth daily 30 tablet 11     mycophenolic acid (GENERIC EQUIVALENT) 180 MG EC tablet Take 1 tablet (180 mg) by mouth 2 times daily. 60 tablet 11     ondansetron (ZOFRAN ODT) 4 MG ODT tab Take 1 tablet (4 mg) by mouth every 6 hours as needed for nausea. 30 tablet 3     pantoprazole (PROTONIX) 40 MG EC tablet Take 1 tablet (40 mg) by mouth 2 times daily (before meals). 60 tablet 11     predniSONE (DELTASONE) 2.5 MG tablet Take 1 tablet (2.5 mg) by mouth every evening Total dose: 5mg in AM and 2.5 mg in PM 30 tablet 11     predniSONE (DELTASONE) 5 MG tablet Take 1 tablet (5 mg) by mouth every morning AND 0.5 tablets (2.5 mg) every evening. 135 tablet 3     propranolol ER (INDERAL LA) 60 MG 24 hr capsule Take 60 mg by mouth daily.       rOPINIRole (REQUIP) 0.25 MG tablet Take 0.25 mg by mouth At Bedtime       rosuvastatin (CRESTOR) 10 MG tablet Take 1 tablet (10 mg) by mouth daily. On hold 1/30/25 for rising LFTs       senna-docusate (SENOKOT-S/PERICOLACE) 8.6-50 MG tablet Take 2 tablets by mouth 2 times daily as needed for constipation. 120 tablet 11     sildenafil (VIAGRA) 100 MG tablet Take 100 mg by mouth daily as needed.       sulfamethoxazole-trimethoprim (BACTRIM) 400-80 MG tablet Take 1 tablet by mouth daily. 30 tablet 11     tacrolimus (ASTAGRAF XL) 0.5 MG 24 hr capsule Take 1 capsule (0.5 mg) by mouth daily. Total dose: 2.5mg daily 30 capsule 11     tacrolimus (ASTAGRAF XL) 1 MG 24 hr capsule Take 2 capsules (2 mg) by mouth daily. Total dose: 2.5mg  "daily 60 capsule 11     warfarin ANTICOAGULANT (COUMADIN) 1 MG tablet TAKE 4 TO 5 TABLETS BY MOUTH ONCE DAILY AS DIRECTED BY  THE  COUMADIN  CLINIC. 420 tablet 0     warfarin ANTICOAGULANT (COUMADIN) 1 MG tablet Take 4 tablets (4 mg) Sun, Wed, Fri; and 5 tablets (5 mg) all other days of the week or as directed by Anticoagulation Clinic. (Patient not taking: Reported on 2/18/2025) 385 tablet 1     zinc sulfate (ZINCATE) 220 (50 Zn) MG capsule Take 1 capsule (220 mg) by mouth daily. 90 capsule 3       Allergies:   No Known Allergies    Social History     Tobacco Use     Smoking status: Never     Smokeless tobacco: Former     Tobacco comments:     Quit chewing over 20 years ago.   Substance Use Topics     Alcohol use: Never       Family History   Problem Relation Age of Onset     Diabetes Type 1 Mother      Heart Disease Mother      Chronic Obstructive Pulmonary Disease Mother      Parkinsonism Mother      Rheumatoid Arthritis Father      Emphysema Paternal Grandfather        Physical Examination:  BP (!) 162/89   Pulse 61   Ht 1.626 m (5' 4\")   Wt 73.9 kg (163 lb)   SpO2 94%   BMI 27.98 kg/m      Wt Readings from Last 6 Encounters:   02/18/25 69.9 kg (154 lb)   10/15/24 72.1 kg (158 lb 14.4 oz)   06/18/24 73.1 kg (161 lb 1.6 oz)   04/30/24 70.3 kg (155 lb)   02/13/24 70.3 kg (155 lb)   11/16/23 66.2 kg (146 lb)     General: Well appearing man in no distress, up and go gppd  Eyes: EOMI. Sclerae and conjunctivae are clear.     Labs and Studies:   Lab Results   Component Value Date    ERIK 9.6 02/18/2025    ERIK 9.2 10/15/2024    ERIK 9.2 06/18/2024    ERIK 9.1 02/13/2024    ALBUMIN 4.0 02/18/2025    ICAW 6.2 (H) 11/02/2021    ALT 79 (H) 02/18/2025    PHOS 3.8 02/18/2025    PTHI 72 (H) 11/14/2022    AST 44 02/18/2025    BILITOTAL 0.3 02/18/2025    CR 1.10 02/18/2025    CR 1.20 (H) 10/15/2024    CR 1.16 06/18/2024     02/18/2025    TSH 5.26 (H) 02/18/2025    ALKPHOS 73 02/18/2025    VITDT 29 02/18/2025    " "TESTOSTTOTAL 414 02/18/2025    HGB 13.6 02/18/2025       Lab Results   Component Value Date     02/18/2025    CHLORIDE 111 (H) 04/21/2025    CO2 31 (H) 02/18/2025    GLC 98 02/18/2025    CR 1.10 02/18/2025    CR 1.20 (H) 10/15/2024    CR 1.16 06/18/2024    CR 1.06 02/13/2024    CR 1.11 11/14/2023    ERIK 9.6 02/18/2025    MAG 1.5 (L) 02/18/2025    ALBUMIN 4.0 02/18/2025    ALKPHOS 73 02/18/2025    LDL 33 02/18/2025    HDL 41 02/18/2025    TRIG 356 (H) 02/18/2025    TSH 5.26 (H) 02/18/2025    TSH 3.05 04/25/2023    TSH 3.17 11/19/2021     No results found for: \"MICROL\"  Lab Results   Component Value Date    A1C 5.7 (H) 02/18/2025    A1C 5.7 (H) 02/13/2024    A1C 6.1 (H) 11/14/2022    A1C 5.3 11/15/2021    A1C 6.6 (H) 09/07/2021       Lab Results   Component Value Date    HGB 13.6 02/18/2025     Results for orders placed in visit on 02/18/25    DX Hip/Pelvis/Spine w Lat Fraction Sarah    Narrative  Images from the original result were not included.    [address] [address]  Phone: [phone]   Fax: [phone]        Impression  The most negative and valid T-score of -2.2 at the level of the right femoral neck corresponds with low bone density according to WHO criteria for postmenopausal females and men age 50 and over.    Results  Lumbar spine L1-L2  T-score -1.1 , BMD 1.0.63 g/cm2.    Left Femoral  neck  T-score -2.0 , BMD 0.814 g/cm2.  Left Total hip  T-score -1.8 , BMD 0.849 g/cm2.    Right Femoral neck  T-score -2.2, BMD  0.786 g/cm2.  Right Total hip  T-score -2.0 , BMD  0.810 g/cm2.    Interval change  Bone density compared to the prior study has changed at L1-2 by +12.6%, at the left total hip by +4.4%.  Ref 3    Percent changes not mentioned or within remaining regions are insignificant    Please note that the differential diagnosis of BMD increase in the spine includes improvement due to pharmacotherapy vs inter-current progression " of spine degeneration or fracture    Fracture risk  The estimated 10-year risk for a major osteoporotic fracture is 15.3% and for a hip fracture is 3.5%. FRAX was calculated based on information provided by the patient on the DXA questionnaire.  FRAX may not accurately predict risk in patient on bisphosphonate and should not be used to assess the reduction in fracture risk in patients on treatment  The risk of osteoporotic fracture increases approximately 2-fold for each 1.0 SD decrease in T-score.  Low bone density is not the only risk factor for fracture; consider factors such as patient's age, fall risk, injury risk, previous osteoporotic fracture, family history of osteoporosis, etc.    Repeat  For patients eligible for Medicare, routine testing is allowed once every 2 years.  Testing frequency can be increased for patients on corticosteroids.    Clinical correlation recommended    Technical quality  Satisfactory.  Abnormal vertebrae may be excluded from analysis if there is more than a 1.0 T-score difference between the vertebrae in question and adjacent vertebrae. Note the wide range in BMD values and T-scores within the lumbar spine. In the circumstances, the lumbar spine is represented by L1-L2      Principal result :  Roseann Moreno MD, [facility]  Division of Endocrinology and Diabetes  Department of Medicine      References:  Ref. 1. WHO categories:  T-score > -1.0  = normal             .  T-score -1.0 to -2.5 = low bone density  T-score < -2.5 = osteoporosis        .    Ref. 2. 2015 ISCD official position statements:  www.iscd.org.    Ref. 3.  Today's examination is compared to the technically similar prior study of the total hip and femur if available. Only changes deemed likely to be significant based on historical data are reported.  According to the ISCD position statements, total hip rather than femoral neck regions are to be compared because larger areas give better precision.  LSC =  least significant changes at the [facility] (historical data)  AP spine =  0.032 g/cm2  (6/26/2007).  Precision assessment for combinations of fewer than 4 vertebrae cannot presently be calculated  Left hip = 0.029 g/cm2  (6/15/2007)  Right hip = 0.018 g/cm2  (6/15/2007)  Left mid radius = 0.043 g/cm2  (6/26/2007)  Bilateral hip LSC is not known  Please note that the differential diagnosis of increase in bone density at the lumbar spine includes improvement due to pharmacotherapy vs inter-current progression of spine degeneration or fracture.    Ref. 4 Fracture risk is calculated in patients aged 40 to 90 years old with low bone density not on osteoporosis treatment. A 10 year fracture risk of 3% and higher for hip fracture and 20% and higher for major osteoporotic fracture is considered higher than acceptable risk and might be an indication for medical treatment.    Ref. 5.  By definition, osteoporosis may be diagnosed in the presence or with the history of a low trauma or fragility fracture.  Fragility and low trauma fracture is defined as a fracture resulting from the force of a fall from a standing height or less or a bone that breaks under conditions that would not cause a normal bone to break.    Ref. 6. NOF Physician's Guideline Website address:  www.nof.org.              Again, thank you for allowing me to participate in the care of your patient.      Sincerely,    Billie Huntley MD

## 2025-04-29 NOTE — PATIENT INSTRUCTIONS
Try to get tooth extraction soon, assure teeth are healed prior to getting reclast infusion, if not you might have to rescheduled the infusion    Start adding Vitamin D3 one tablet 800-1000 units    Stop Levothyroxine    Recheck labs in 2 months

## 2025-04-30 ENCOUNTER — TELEPHONE (OUTPATIENT)
Dept: TRANSPLANT | Facility: CLINIC | Age: 60
End: 2025-04-30
Payer: COMMERCIAL

## 2025-04-30 ENCOUNTER — TELEPHONE (OUTPATIENT)
Dept: ANTICOAGULATION | Facility: CLINIC | Age: 60
End: 2025-04-30
Payer: COMMERCIAL

## 2025-04-30 DIAGNOSIS — Z94.2 S/P LUNG TRANSPLANT (H): ICD-10-CM

## 2025-04-30 NOTE — TELEPHONE ENCOUNTER
Patient seen by local provider today with PCP's office  Patient went in with R sided chest pain, around breast area, sore with a deep breath. Upper back is also hurting on the R side. Patients states this feels like a sore muscle.     Pt denies feeling SOB, breathing at baseline. No fever. Otherwise feels at his baseline.     PCP thinks this is infection stating versus ribs rubbing- inflammation?   WBC 11.3    PCP prescribed Doxy 100mg BID x10 days   Pred 20mg x10 days     Reviewed with Dr. Woods:   -okay to take doxy/prednisone  -if pain gets any worse, needs to be evaluated in local ED  -pt instructed to let [facility] know if pain doesn't improve with start of medications

## 2025-04-30 NOTE — TELEPHONE ENCOUNTER
Patient had labs drawn today but no INR was done.  Patient reports that he has to go into clinic for a tacro level so he will go into clinic tomorrow.

## 2025-05-01 ENCOUNTER — ANTICOAGULATION THERAPY VISIT (OUTPATIENT)
Dept: ANTICOAGULATION | Facility: CLINIC | Age: 60
End: 2025-05-01
Payer: COMMERCIAL

## 2025-05-01 DIAGNOSIS — I48.91 ATRIAL FIBRILLATION, UNSPECIFIED TYPE (H): Primary | ICD-10-CM

## 2025-05-01 DIAGNOSIS — Z94.2 S/P LUNG TRANSPLANT (H): Chronic | ICD-10-CM

## 2025-05-01 DIAGNOSIS — I63.9 ISCHEMIC STROKE (H): ICD-10-CM

## 2025-05-01 DIAGNOSIS — D84.9 IMMUNOSUPPRESSED STATUS: ICD-10-CM

## 2025-05-01 DIAGNOSIS — Z79.899 ENCOUNTER FOR LONG-TERM (CURRENT) USE OF HIGH-RISK MEDICATION: ICD-10-CM

## 2025-05-01 LAB — INR (EXTERNAL): 2.7

## 2025-05-01 NOTE — PROGRESS NOTES
ANTICOAGULATION MANAGEMENT     Edson Thornton Jr. 59 year old male is on warfarin with therapeutic INR result. (Goal INR 2.0-3.0)    Recent labs: (last 7 days)     05/01/25  0721   INR 2.7       ASSESSMENT     Source(s): Chart Review and Patient/Caregiver Call     Warfarin doses taken: Warfarin taken as instructed  Diet: No new diet changes identified  Medication/supplement changes:  Prednisone dose increased-patient will start taking tomorrow, 5/2/25 x 10 days which may increase or decrease INR.  Doxycycline 10 day course (dates: 5/2/25-5/12/25) not expected to affect INR, but may increase risk of bleeding  New illness, injury, or hospitalization: yes-Outside OV yesterday: R sided chest pain, around breast area, sore with a deep breath. Upper back is also hurting on the R side.   Signs or symptoms of bleeding or clotting: No  Previous result: Subtherapeutic  Additional findings:     [facility] consult (Increased Prednisone dose + Doxycycline x 10 days START 5/2/25): needed less wararin in Feb when on prednisone, decrease maintenance dose while on increased Prednisone ~15%   PLAN     Recommended plan for temporary change(s) affecting INR     Dosing Instructions: decrease your warfarin dose (14.8% change) with next INR in 5 days       Summary  As of 5/1/2025      Full warfarin instructions:  4 mg every Mon, Thu; 3 mg all other days   Next INR check:  5/6/2025               Telephone call with Bret who agrees to plan and repeated back plan correctly  Sent [employer] message with dosing and follow up instructions    Patient using outside facility for labs    Education provided: Goal range and lab monitoring: goal range and significance of current result and Importance of following up at instructed interval  Interaction IS anticipated between warfarin and Prednisone increase, doxycycline  Contact [phone] with any changes, questions or concerns.     Plan made with [facility] Pharmacist Antonella COLÓN,  RN  5/1/2025  Anticoagulation Clinic  [facility] for routing messages: [facility]rew [facility]  ACC patient phone line: [phone]        _______________________________________________________________________     Anticoagulation Episode Summary       Current INR goal:  2.0-3.0   TTR:  50.5% (1 y)   Target end date:  Indefinite   Send INR reminders to:  [facility]    Indications    Atrial fibrillation  unspecified type (H) [I48.91]  Encounter for long-term (current) use of high-risk medication [Z79.899]  Immunosuppressed status [D84.9]  S/P lung transplant (H) [Z94.2]  Ischemic stroke (H) [I63.9]             Comments:  --             Anticoagulation Care Providers       Provider Role Specialty Phone number    Abiodun Woods MD Referring Pulmonary Disease [phone]

## 2025-05-03 ENCOUNTER — TELEPHONE (OUTPATIENT)
Dept: TRANSPLANT | Facility: CLINIC | Age: 60
End: 2025-05-03
Payer: COMMERCIAL

## 2025-05-03 DIAGNOSIS — Z94.2 S/P LUNG TRANSPLANT (H): ICD-10-CM

## 2025-05-03 NOTE — TELEPHONE ENCOUNTER
Bret called saying he has not received his delivery of Tacro XL and will be out. He noted he still has the 12 hour capsules and is wondering if he can take those until he can get his delivery. Writer contacted transplant pulmonologist for guidance on dosing conversion. Per Dr Wells, patient advised to take tacro 1.5mg BID while waiting for his next delivery and then to touch base with his primary coordinator as he is due for level rechecks this week.    Patient verbalized understanding

## 2025-05-07 ENCOUNTER — TELEPHONE (OUTPATIENT)
Dept: TRANSPLANT | Facility: CLINIC | Age: 60
End: 2025-05-07
Payer: COMMERCIAL

## 2025-05-07 DIAGNOSIS — Z94.2 S/P LUNG TRANSPLANT (H): ICD-10-CM

## 2025-05-07 NOTE — TELEPHONE ENCOUNTER
Provider Call: General  Route to LPN    Reason for call: Call from Onelia- she received referral from Alfred this AM-  She wants to review the referral with us - She is just boarding the plane soon- can leave her a message and she will reach back with us     Call back needed? Yes    Return Call Needed  Same as documented in contacts section  When to return call?: Greater than one day: Route standard priority

## 2025-05-08 ENCOUNTER — TELEPHONE (OUTPATIENT)
Dept: TRANSPLANT | Facility: CLINIC | Age: 60
End: 2025-05-08
Payer: COMMERCIAL

## 2025-05-08 ENCOUNTER — TELEPHONE (OUTPATIENT)
Dept: ANTICOAGULATION | Facility: CLINIC | Age: 60
End: 2025-05-08
Payer: COMMERCIAL

## 2025-05-08 DIAGNOSIS — Z94.2 S/P LUNG TRANSPLANT (H): ICD-10-CM

## 2025-05-08 NOTE — LETTER
PHYSICIAN ORDERS      DATE & TIME ISSUED: May 8, 2025 3:44 PM  PATIENT NAME: Edson Thornton Jr.   : 1965     [facility] MR# [if applicable]: [id number]     DIAGNOSIS:  Lung Transplant  Z94.2  Please give patient IVIG (Privigen 10% sucrose-free) IV x1 dose       Any questions please call: Alfred [phone]    Please fax these results to [phone].      .  Abiodun Woods MD  Division of Pulmonary, Allergy, Critical Care and Sleep Medicine  Professor of Medicine

## 2025-05-08 NOTE — TELEPHONE ENCOUNTER
ANTICOAGULATION     Edson Thornton Jr. is overdue for an INR check.     Bret will get labs tomorrow    Terrie Cedillo, RN  5/8/2025  Anticoagulation Clinic  [facility] for routing messages: andrew [facility]  ACC patient phone line: [phone]

## 2025-05-08 NOTE — TELEPHONE ENCOUNTER
Steffany with [facility] calling requesting more information regarding IVIG orders     Will fax IVIG therapy plan and order for Privigen x1 dose  Steffany thinking patient shouldn't have an issue with insurance but will let us know.     Fax: [phone]  Phone: [phone] Steffany, .

## 2025-05-09 ENCOUNTER — LAB (OUTPATIENT)
Dept: LAB | Facility: CLINIC | Age: 60
End: 2025-05-09
Payer: COMMERCIAL

## 2025-05-09 DIAGNOSIS — Z94.2 LUNG REPLACED BY TRANSPLANT (H): ICD-10-CM

## 2025-05-09 LAB — INR (EXTERNAL): 2.7

## 2025-05-09 PROCEDURE — 80197 ASSAY OF TACROLIMUS: CPT

## 2025-05-12 ENCOUNTER — TELEPHONE (OUTPATIENT)
Dept: TRANSPLANT | Facility: CLINIC | Age: 60
End: 2025-05-12
Payer: COMMERCIAL

## 2025-05-12 ENCOUNTER — ANTICOAGULATION THERAPY VISIT (OUTPATIENT)
Dept: ANTICOAGULATION | Facility: CLINIC | Age: 60
End: 2025-05-12
Payer: COMMERCIAL

## 2025-05-12 ENCOUNTER — RESULTS FOLLOW-UP (OUTPATIENT)
Dept: TRANSPLANT | Facility: CLINIC | Age: 60
End: 2025-05-12

## 2025-05-12 DIAGNOSIS — I63.9 ISCHEMIC STROKE (H): ICD-10-CM

## 2025-05-12 DIAGNOSIS — I48.91 ATRIAL FIBRILLATION, UNSPECIFIED TYPE (H): Primary | ICD-10-CM

## 2025-05-12 DIAGNOSIS — Z79.899 ENCOUNTER FOR LONG-TERM (CURRENT) USE OF HIGH-RISK MEDICATION: ICD-10-CM

## 2025-05-12 DIAGNOSIS — Z94.2 S/P LUNG TRANSPLANT (H): ICD-10-CM

## 2025-05-12 DIAGNOSIS — D84.9 IMMUNOSUPPRESSED STATUS: ICD-10-CM

## 2025-05-12 DIAGNOSIS — Z94.2 S/P LUNG TRANSPLANT (H): Chronic | ICD-10-CM

## 2025-05-12 LAB
TACROLIMUS BLD-MCNC: 5.9 UG/L (ref 5–15)
TME LAST DOSE: NORMAL H
TME LAST DOSE: NORMAL H

## 2025-05-12 NOTE — TELEPHONE ENCOUNTER
General  Route to LPN    Reason for call:     Call back neededNeeds to find out where he is receiving his infusion ? Yes    Return Call Needed  Same as documented in contacts section  When to return call?: Greater than one day: Route standard priority

## 2025-05-12 NOTE — TELEPHONE ENCOUNTER
Confirmed with [facility] that pt's authorization for IVIG has been approved and pt will be set up with IVIG infusion.  (Due ~5/22).  They will call tx office once infusion is scheduled.    Confirmed with [facility] that pt will get IVIG with [facility] and does not need appointment with them later this month.

## 2025-05-12 NOTE — PROGRESS NOTES
ANTICOAGULATION MANAGEMENT     Edson Thornton Jr. 59 year old male is on warfarin with therapeutic INR result. (Goal INR 2.0-3.0)    Recent labs: (last 7 days)     05/09/25  0718   INR 2.7*       ASSESSMENT     Source(s): Chart Review and Patient/Caregiver Call     Warfarin doses taken: Warfarin taken as instructed  Diet: No new diet changes identified  Medication/supplement changes: Patient COMPLETED increase in Prednisone dose on 5/12/25 which may increase or decrease INR.   Patient COMPLETED Doxycycline 10 day course (dates: 5/2/25-5/12/25) not expected to affect INR.  New illness, injury, or hospitalization: Yes: fall on 5/8/25. Patient reports bruising as stable. Endorsed hitting his head--received head CT and followed up with primary, no changes reported, no ongoing symptoms.  Signs or symptoms of bleeding or clotting: Yes: generalized bruising from fall. Patient reports no change and is monitoring them.  Previous result: Therapeutic last 2(+) visits  Additional findings: Patient receive 3 out of 3 doses of IVIG on 5/23. No plan for further dosages at this time. Per H, not expected to impact INR.       PLAN     Recommended plan for ongoing change(s) affecting INR     Dosing Instructions: Increase your warfarin dose (17.4% change) with next INR in 1 week   due to completing increased prednisone dose, MD adjusted back to previous dose per [facility] consult.    Summary  As of 5/12/2025      Full warfarin instructions:  3 mg every Sat; 4 mg all other days; Starting 5/14/2025   Next INR check:  5/16/2025               Telephone call with Bret who verbalizes understanding and agrees to plan, who agrees to plan and repeated back plan correctly, and ; made aware of patient survey and encouraged to participate.  Sent [employer] message with dosing and follow up instructions    Patient offered & declined to schedule next visit    Education provided: Please call back if any changes to your diet, medications or how you've been  taking warfarin  Goal range and lab monitoring: goal range and significance of current result, Importance of therapeutic range, and Importance of following up at instructed interval  Interaction IS anticipated between warfarin and decreased prednisone dosage  Symptom monitoring: monitoring for bleeding signs and symptoms, when to seek medical attention/emergency care, and if you hit your head or have a bad fall seek emergency care  Written instructions provided  Contact [phone] with any changes, questions or concerns.     Plan made with [facility] Pharmacist Usha Gibson RN  5/12/2025  Anticoagulation Clinic  [facility] for routing messages: p [facility] patient phone line: [phone]        _______________________________________________________________________     Anticoagulation Episode Summary       Current INR goal:  2.0-3.0   TTR:  52.1% (1 y)   Target end date:  Indefinite   Send INR reminders to:  [facility]    Indications    Atrial fibrillation  unspecified type (H) [I48.91]  Encounter for long-term (current) use of high-risk medication [Z79.899]  Immunosuppressed status [D84.9]  S/P lung transplant (H) [Z94.2]  Ischemic stroke (H) [I63.9]             Comments:  --             Anticoagulation Care Providers       Provider Role Specialty Phone number    Abiodun Woods MD Referring Pulmonary Disease [phone]

## 2025-05-13 ENCOUNTER — RESULTS FOLLOW-UP (OUTPATIENT)
Dept: TRANSPLANT | Facility: CLINIC | Age: 60
End: 2025-05-13

## 2025-05-13 ENCOUNTER — TELEPHONE (OUTPATIENT)
Dept: TRANSPLANT | Facility: CLINIC | Age: 60
End: 2025-05-13
Payer: COMMERCIAL

## 2025-05-13 DIAGNOSIS — Z94.2 S/P LUNG TRANSPLANT (H): ICD-10-CM

## 2025-05-13 NOTE — PROGRESS NOTES
Pt scheduled for next IVIG infusion on 5/23 with [facility].  Plan for repeat IgG level one month after infusion.

## 2025-05-13 NOTE — TELEPHONE ENCOUNTER
Tacrolimus level 5.9 at 12 hours, on 5/9/2025.  Goal 8-10.   Current dose 2.5 mg daily    Dose changed to 3 mg daily  Recheck level in 5-7 days    Discussed with patient via [employer] message sent

## 2025-05-15 ENCOUNTER — TELEPHONE (OUTPATIENT)
Dept: TRANSPLANT | Facility: CLINIC | Age: 60
End: 2025-05-15
Payer: COMMERCIAL

## 2025-05-15 DIAGNOSIS — Z94.2 S/P LUNG TRANSPLANT (H): ICD-10-CM

## 2025-05-15 NOTE — TELEPHONE ENCOUNTER
General  Route to LPN    Reason for call: Berna has question regarding the IVIG orders that were sent over     Call back needed? Yes    Return Call Needed  Same as documented in contacts section  When to return call?: Greater than one day: Route standard priority

## 2025-05-15 NOTE — TELEPHONE ENCOUNTER
Writer returned call Berna   Had to clarify orders on IVIG therapy plan     Pt scheduled to receive IVIG with [facility] on 5/23

## 2025-05-20 ENCOUNTER — ANTICOAGULATION THERAPY VISIT (OUTPATIENT)
Dept: ANTICOAGULATION | Facility: CLINIC | Age: 60
End: 2025-05-20
Payer: COMMERCIAL

## 2025-05-20 ENCOUNTER — RESULTS FOLLOW-UP (OUTPATIENT)
Dept: TRANSPLANT | Facility: CLINIC | Age: 60
End: 2025-05-20

## 2025-05-20 ENCOUNTER — LAB (OUTPATIENT)
Dept: LAB | Facility: CLINIC | Age: 60
End: 2025-05-20
Payer: COMMERCIAL

## 2025-05-20 DIAGNOSIS — D84.9 IMMUNOSUPPRESSED STATUS: ICD-10-CM

## 2025-05-20 DIAGNOSIS — I63.9 ISCHEMIC STROKE (H): ICD-10-CM

## 2025-05-20 DIAGNOSIS — Z94.2 S/P LUNG TRANSPLANT (H): ICD-10-CM

## 2025-05-20 DIAGNOSIS — I48.91 ATRIAL FIBRILLATION, UNSPECIFIED TYPE (H): Primary | ICD-10-CM

## 2025-05-20 DIAGNOSIS — Z79.899 ENCOUNTER FOR LONG-TERM (CURRENT) USE OF HIGH-RISK MEDICATION: ICD-10-CM

## 2025-05-20 DIAGNOSIS — Z94.2 S/P LUNG TRANSPLANT (H): Chronic | ICD-10-CM

## 2025-05-20 LAB — INR (EXTERNAL): 2.6

## 2025-05-20 PROCEDURE — 80197 ASSAY OF TACROLIMUS: CPT | Performed by: INTERNAL MEDICINE

## 2025-05-20 NOTE — PROGRESS NOTES
Incoming fax from [facility]    Date of result 5/20/2025    INR result 2.6    Route result to: andrew [facility]

## 2025-05-20 NOTE — PROGRESS NOTES
ANTICOAGULATION MANAGEMENT     Edson Thornton Jr. 59 year old male is on warfarin with therapeutic INR result. (Goal INR 2.0-3.0)    Recent labs: (last 7 days)     05/20/25  0742   INR 2.6       ASSESSMENT     Source(s): Chart Review and Patient/Caregiver Call     Warfarin doses taken: Warfarin taken as instructed  Diet: No new diet changes identified  Medication/supplement changes: None noted  New illness, injury, or hospitalization: No  Signs or symptoms of bleeding or clotting: No  Previous result: Therapeutic last 2(+) visits  Additional findings: None       PLAN     Recommended plan for no diet, medication or health factor changes affecting INR     Dosing Instructions: Continue your current warfarin dose with next INR in 2 weeks       Summary  As of 5/20/2025      Full warfarin instructions:  3 mg every Sat; 4 mg all other days   Next INR check:  6/3/2025               Telephone call with Bret who verbalizes understanding and agrees to plan and who agrees to plan and repeated back plan correctly    Patient using outside facility for labs    Education provided: Contact [phone] with any changes, questions or concerns.     Plan made per [facility] anticoagulation protocol    [facility] referral renewal due-routed pending orders from encounter from 4/21/25 to Provider again today.    Kathy Corbett RN  5/20/2025  Anticoagulation Clinic  [place] for routing messages: p [facility]  [facility] patient phone line: [phone]        _______________________________________________________________________     Anticoagulation Episode Summary       Current INR goal:  2.0-3.0   TTR:  52.5% (1 y)   Target end date:  Indefinite   Send INR reminders to:  [facility]    Indications    Atrial fibrillation  unspecified type (H) [I48.91]  Encounter for long-term (current) use of high-risk medication [Z79.899]  Immunosuppressed status [D84.9]  S/P lung transplant (H) [Z94.2]  Ischemic stroke (H) [I63.9]              Comments:  --             Anticoagulation Care Providers       Provider Role Specialty Phone number    Abiodun Woods MD Referring Pulmonary Disease [phone]

## 2025-05-22 ENCOUNTER — TELEPHONE (OUTPATIENT)
Dept: TRANSPLANT | Facility: CLINIC | Age: 60
End: 2025-05-22
Payer: COMMERCIAL

## 2025-05-22 DIAGNOSIS — Z94.2 S/P LUNG TRANSPLANT (H): ICD-10-CM

## 2025-05-22 LAB
TACROLIMUS BLD-MCNC: 7.6 UG/L (ref 5–15)
TME LAST DOSE: NORMAL H
TME LAST DOSE: NORMAL H

## 2025-05-22 RX ORDER — TACROLIMUS 1 MG/1
CAPSULE ORAL
Qty: 270 CAPSULE | Refills: 3 | Status: SHIPPED | OUTPATIENT
Start: 2025-05-22

## 2025-05-22 RX ORDER — TACROLIMUS 0.5 MG/1
0.5 CAPSULE ORAL DAILY
Qty: 90 CAPSULE | Refills: 3 | Status: SHIPPED | OUTPATIENT
Start: 2025-05-22

## 2025-05-22 NOTE — TELEPHONE ENCOUNTER
Tacrolimus level 7.6 at close to 12 hours on 5/20/25  Goal 8-10  Current dose Astagraf XL 3mg daily    Pt wanting to switch back to BID dosing of regular tacrolimus. States no improvement in tremors with Astagraf. Pharmacist confirmed with patient if Astagraf was going to help, it would have by now.     New tacrolimus dose going forward 2/1.5  Repeat level early next week   Pt understanding of plan

## 2025-05-28 ENCOUNTER — LAB (OUTPATIENT)
Dept: LAB | Facility: CLINIC | Age: 60
End: 2025-05-28
Payer: COMMERCIAL

## 2025-05-28 PROCEDURE — 80197 ASSAY OF TACROLIMUS: CPT | Performed by: INTERNAL MEDICINE

## 2025-05-29 DIAGNOSIS — Z94.2 S/P LUNG TRANSPLANT (H): ICD-10-CM

## 2025-05-30 LAB
TACROLIMUS BLD-MCNC: 11.9 UG/L (ref 5–15)
TME LAST DOSE: NORMAL H
TME LAST DOSE: NORMAL H

## 2025-06-02 ENCOUNTER — RESULTS FOLLOW-UP (OUTPATIENT)
Dept: TRANSPLANT | Facility: CLINIC | Age: 60
End: 2025-06-02

## 2025-06-02 DIAGNOSIS — Z94.2 S/P LUNG TRANSPLANT (H): ICD-10-CM

## 2025-06-02 RX ORDER — TACROLIMUS 0.5 MG/1
0.5 CAPSULE ORAL 2 TIMES DAILY
Qty: 180 CAPSULE | Refills: 3 | Status: SHIPPED | OUTPATIENT
Start: 2025-06-02

## 2025-06-02 RX ORDER — TACROLIMUS 1 MG/1
1 CAPSULE ORAL 2 TIMES DAILY
Qty: 180 CAPSULE | Refills: 3 | Status: SHIPPED | OUTPATIENT
Start: 2025-06-02

## 2025-06-02 NOTE — PROGRESS NOTES
Tacrolimus level 11.9 at 12.25 hours, on 5/28/25.  Goal 8-10.   Current dose 2 mg in AM, 1.5 mg in PM    Dose changed to 1.5 mg in AM, 1.5 mg in PM   Recheck level in 7-10 days     MyChart message sent and discussed with pt.

## 2025-06-05 ENCOUNTER — TELEPHONE (OUTPATIENT)
Dept: ANTICOAGULATION | Facility: CLINIC | Age: 60
End: 2025-06-05
Payer: COMMERCIAL

## 2025-06-05 NOTE — TELEPHONE ENCOUNTER
ANTICOAGULATION     Edson Thornton Jr. is overdue for an INR check.     Spoke with Bret and he will go into his local clinic tomorrow.     Terrie Cedillo RN  6/5/2025  Anticoagulation Clinic  [facility] for routing messages: andrew [facility] patient phone line: [phone]

## 2025-06-08 ENCOUNTER — HEALTH MAINTENANCE LETTER (OUTPATIENT)
Age: 60
End: 2025-06-08

## 2025-06-09 ENCOUNTER — LAB (OUTPATIENT)
Dept: LAB | Facility: CLINIC | Age: 60
End: 2025-06-09
Payer: COMMERCIAL

## 2025-06-09 DIAGNOSIS — Z94.2 LUNG REPLACED BY TRANSPLANT (H): ICD-10-CM

## 2025-06-09 PROCEDURE — 80197 ASSAY OF TACROLIMUS: CPT

## 2025-06-11 DIAGNOSIS — Z94.2 LUNG REPLACED BY TRANSPLANT (H): Primary | ICD-10-CM

## 2025-06-11 DIAGNOSIS — Z94.2 S/P LUNG TRANSPLANT (H): ICD-10-CM

## 2025-06-11 LAB
TACROLIMUS BLD-MCNC: 10.2 UG/L (ref 5–15)
TME LAST DOSE: NORMAL H
TME LAST DOSE: NORMAL H

## 2025-06-12 ENCOUNTER — TELEPHONE (OUTPATIENT)
Dept: TRANSPLANT | Facility: CLINIC | Age: 60
End: 2025-06-12
Payer: COMMERCIAL

## 2025-06-12 ENCOUNTER — RESULTS FOLLOW-UP (OUTPATIENT)
Dept: TRANSPLANT | Facility: CLINIC | Age: 60
End: 2025-06-12

## 2025-06-12 DIAGNOSIS — Z94.2 S/P LUNG TRANSPLANT (H): ICD-10-CM

## 2025-06-12 LAB — INR (EXTERNAL): 2.3

## 2025-06-12 NOTE — TELEPHONE ENCOUNTER
Pt reports in ED at [facility]     Saw PCP this morning for sharp pain when breathing in/out, they sent him in worried about blood clots. Feels more SOB. Has had a couple episodes at home where he feels like he is going to pass out. Happens randomly. No fever. No cough. No sputum.    Bedside RN took down number to contact tx pulsalomon MCDOWELL on-call.   Patient to keep RNCC coordinator notified of changes and/or when patient leaves ED.     Inpatient tx team notified.

## 2025-06-13 PROBLEM — M85.80 OSTEOPENIA: Status: ACTIVE | Noted: 2025-06-13

## 2025-06-13 LAB — INR (EXTERNAL): 2.1

## 2025-06-14 LAB — INR (EXTERNAL): 2.8

## 2025-06-15 LAB — INR (EXTERNAL): 2.6

## 2025-06-17 ENCOUNTER — RESULTS FOLLOW-UP (OUTPATIENT)
Dept: TRANSPLANT | Facility: CLINIC | Age: 60
End: 2025-06-17

## 2025-06-17 ENCOUNTER — LAB (OUTPATIENT)
Dept: LAB | Facility: CLINIC | Age: 60
End: 2025-06-17
Attending: INTERNAL MEDICINE
Payer: COMMERCIAL

## 2025-06-17 ENCOUNTER — INFUSION THERAPY VISIT (OUTPATIENT)
Dept: INFUSION THERAPY | Facility: CLINIC | Age: 60
End: 2025-06-17
Attending: INTERNAL MEDICINE
Payer: COMMERCIAL

## 2025-06-17 ENCOUNTER — ANCILLARY PROCEDURE (OUTPATIENT)
Dept: GENERAL RADIOLOGY | Facility: CLINIC | Age: 60
End: 2025-06-17
Attending: INTERNAL MEDICINE
Payer: COMMERCIAL

## 2025-06-17 ENCOUNTER — OFFICE VISIT (OUTPATIENT)
Dept: TRANSPLANT | Facility: CLINIC | Age: 60
End: 2025-06-17
Attending: INTERNAL MEDICINE

## 2025-06-17 VITALS — SYSTOLIC BLOOD PRESSURE: 135 MMHG | HEART RATE: 56 BPM | DIASTOLIC BLOOD PRESSURE: 80 MMHG

## 2025-06-17 VITALS
DIASTOLIC BLOOD PRESSURE: 87 MMHG | SYSTOLIC BLOOD PRESSURE: 151 MMHG | BODY MASS INDEX: 27.83 KG/M2 | WEIGHT: 163 LBS | HEART RATE: 57 BPM | OXYGEN SATURATION: 95 % | RESPIRATION RATE: 20 BRPM | HEIGHT: 64 IN

## 2025-06-17 DIAGNOSIS — Z94.2 S/P LUNG TRANSPLANT (H): ICD-10-CM

## 2025-06-17 DIAGNOSIS — M81.8 OTHER OSTEOPOROSIS WITHOUT CURRENT PATHOLOGICAL FRACTURE: ICD-10-CM

## 2025-06-17 DIAGNOSIS — M85.80 OSTEOPENIA: Primary | ICD-10-CM

## 2025-06-17 DIAGNOSIS — Z94.2 LUNG REPLACED BY TRANSPLANT (H): ICD-10-CM

## 2025-06-17 DIAGNOSIS — D84.9 IMMUNOSUPPRESSED STATUS: ICD-10-CM

## 2025-06-17 DIAGNOSIS — R76.8 LOW IMMUNOGLOBULIN LEVEL: ICD-10-CM

## 2025-06-17 LAB
6 MIN WALK (FT): 1110 FT
6 MIN WALK (M): 338 M
ALBUMIN SERPL BCG-MCNC: 4.6 G/DL (ref 3.5–5.2)
ALP SERPL-CCNC: 60 U/L (ref 40–150)
ALT SERPL W P-5'-P-CCNC: 34 U/L (ref 0–70)
ANION GAP SERPL CALCULATED.3IONS-SCNC: 11 MMOL/L (ref 7–15)
AST SERPL W P-5'-P-CCNC: 32 U/L (ref 0–45)
BASOPHILS # BLD AUTO: 0.1 10E3/UL (ref 0–0.2)
BASOPHILS NFR BLD AUTO: 1 %
BILIRUB SERPL-MCNC: 0.4 MG/DL
BILIRUBIN DIRECT (ROCHE PRO & PURE): 0.16 MG/DL (ref 0–0.45)
BUN SERPL-MCNC: 16.2 MG/DL (ref 8–23)
CALCIUM SERPL-MCNC: 9.8 MG/DL (ref 8.8–10.4)
CALCIUM SERPL-MCNC: 9.8 MG/DL (ref 8.8–10.4)
CHLORIDE SERPL-SCNC: 104 MMOL/L (ref 98–107)
CREAT SERPL-MCNC: 0.99 MG/DL (ref 0.67–1.17)
DLCOCOR-%PRED-PRE: 62 %
DLCOCOR-PRE: 14.37 ML/MIN/MMHG
DLCOUNC-%PRED-PRE: 62 %
DLCOUNC-PRE: 14.41 ML/MIN/MMHG
DLCOUNC-PRED: 23.02 ML/MIN/MMHG
EGFRCR SERPLBLD CKD-EPI 2021: 87 ML/MIN/1.73M2
EOSINOPHIL # BLD AUTO: 0.1 10E3/UL (ref 0–0.7)
EOSINOPHIL NFR BLD AUTO: 1 %
ERV-%PRED-PRE: 17 %
ERV-PRE: 0.22 L
ERV-PRED: 1.25 L
ERYTHROCYTE [DISTWIDTH] IN BLOOD BY AUTOMATED COUNT: 13 % (ref 10–15)
EXPTIME-PRE: 7.09 SEC
FEF2575-%PRED-PRE: 25 %
FEF2575-PRE: 0.61 L/SEC
FEF2575-PRED: 2.43 L/SEC
FEFMAX-%PRED-PRE: 49 %
FEFMAX-PRE: 3.88 L/SEC
FEFMAX-PRED: 7.92 L/SEC
FEV1-%PRED-PRE: 46 %
FEV1-PRE: 1.27 L
FEV1FEV6-PRE: 63 %
FEV1FEV6-PRED: 79 %
FEV1FVC-PRE: 62 %
FEV1FVC-PRED: 79 %
FEV1SVC-PRE: 63 %
FEV1SVC-PRED: 71 %
FIFMAX-PRE: 4.36 L/SEC
FRCPLETH-%PRED-PRE: 84 %
FRCPLETH-PRE: 2.47 L
FRCPLETH-PRED: 2.92 L
FVC-%PRED-PRE: 59 %
FVC-PRE: 2.05 L
FVC-PRED: 3.44 L
GLUCOSE SERPL-MCNC: 79 MG/DL (ref 70–99)
HCO3 SERPL-SCNC: 27 MMOL/L (ref 22–29)
HCT VFR BLD AUTO: 42.7 % (ref 40–53)
HGB BLD-MCNC: 14.7 G/DL (ref 13.3–17.7)
IC-%PRED-PRE: 71 %
IC-PRE: 1.8 L
IC-PRED: 2.51 L
IMM GRANULOCYTES # BLD: 0.1 10E3/UL
IMM GRANULOCYTES NFR BLD: 2 %
LYMPHOCYTES # BLD AUTO: 1.7 10E3/UL (ref 0.8–5.3)
LYMPHOCYTES NFR BLD AUTO: 18 %
MAGNESIUM SERPL-MCNC: 1.5 MG/DL (ref 1.7–2.3)
MCH RBC QN AUTO: 31 PG (ref 26.5–33)
MCHC RBC AUTO-ENTMCNC: 34.4 G/DL (ref 31.5–36.5)
MCV RBC AUTO: 90 FL (ref 78–100)
MONOCYTES # BLD AUTO: 0.8 10E3/UL (ref 0–1.3)
MONOCYTES NFR BLD AUTO: 9 %
NEUTROPHILS # BLD AUTO: 6.6 10E3/UL (ref 1.6–8.3)
NEUTROPHILS NFR BLD AUTO: 70 %
NRBC # BLD AUTO: 0 10E3/UL
NRBC BLD AUTO-RTO: 0 /100
PHOSPHATE SERPL-MCNC: 2.4 MG/DL (ref 2.5–4.5)
PLATELET # BLD AUTO: 177 10E3/UL (ref 150–450)
POTASSIUM SERPL-SCNC: 4.1 MMOL/L (ref 3.4–5.3)
PROT SERPL-MCNC: 7.2 G/DL (ref 6.4–8.3)
RBC # BLD AUTO: 4.74 10E6/UL (ref 4.4–5.9)
RVPLETH-%PRED-PRE: 101 %
RVPLETH-PRE: 2.25 L
RVPLETH-PRED: 2.22 L
SODIUM SERPL-SCNC: 142 MMOL/L (ref 135–145)
T4 FREE SERPL-MCNC: 1.36 NG/DL (ref 0.9–1.7)
TACROLIMUS BLD-MCNC: 8 UG/L (ref 5–15)
TLCPLETH-%PRED-PRE: 72 %
TLCPLETH-PRE: 4.27 L
TLCPLETH-PRED: 5.91 L
TME LAST DOSE: NORMAL H
TME LAST DOSE: NORMAL H
TSH SERPL DL<=0.005 MIU/L-ACNC: 6.22 UIU/ML (ref 0.3–4.2)
VA-%PRED-PRE: 59 %
VA-PRE: 3.15 L
VC-%PRED-PRE: 52 %
VC-PRE: 2.02 L
VC-PRED: 3.84 L
VIT D+METAB SERPL-MCNC: 39 NG/ML (ref 20–50)
WBC # BLD AUTO: 9.4 10E3/UL (ref 4–11)

## 2025-06-17 PROCEDURE — 3077F SYST BP >= 140 MM HG: CPT | Performed by: INTERNAL MEDICINE

## 2025-06-17 PROCEDURE — 96365 THER/PROPH/DIAG IV INF INIT: CPT

## 2025-06-17 PROCEDURE — 3079F DIAST BP 80-89 MM HG: CPT | Performed by: INTERNAL MEDICINE

## 2025-06-17 PROCEDURE — 85025 COMPLETE CBC W/AUTO DIFF WBC: CPT | Performed by: PATHOLOGY

## 2025-06-17 PROCEDURE — 250N000011 HC RX IP 250 OP 636: Performed by: INTERNAL MEDICINE

## 2025-06-17 PROCEDURE — 99213 OFFICE O/P EST LOW 20 MIN: CPT | Performed by: INTERNAL MEDICINE

## 2025-06-17 PROCEDURE — 84443 ASSAY THYROID STIM HORMONE: CPT | Performed by: PATHOLOGY

## 2025-06-17 PROCEDURE — 36415 COLL VENOUS BLD VENIPUNCTURE: CPT | Performed by: PATHOLOGY

## 2025-06-17 PROCEDURE — 99215 OFFICE O/P EST HI 40 MIN: CPT | Mod: 25 | Performed by: INTERNAL MEDICINE

## 2025-06-17 PROCEDURE — 1125F AMNT PAIN NOTED PAIN PRSNT: CPT | Performed by: INTERNAL MEDICINE

## 2025-06-17 PROCEDURE — 80197 ASSAY OF TACROLIMUS: CPT | Performed by: INTERNAL MEDICINE

## 2025-06-17 PROCEDURE — 84100 ASSAY OF PHOSPHORUS: CPT | Performed by: PATHOLOGY

## 2025-06-17 PROCEDURE — 83735 ASSAY OF MAGNESIUM: CPT | Performed by: PATHOLOGY

## 2025-06-17 PROCEDURE — 84439 ASSAY OF FREE THYROXINE: CPT | Performed by: PATHOLOGY

## 2025-06-17 PROCEDURE — 80053 COMPREHEN METABOLIC PANEL: CPT | Performed by: PATHOLOGY

## 2025-06-17 PROCEDURE — 71046 X-RAY EXAM CHEST 2 VIEWS: CPT | Mod: GC | Performed by: RADIOLOGY

## 2025-06-17 PROCEDURE — 87799 DETECT AGENT NOS DNA QUANT: CPT | Performed by: INTERNAL MEDICINE

## 2025-06-17 PROCEDURE — 82248 BILIRUBIN DIRECT: CPT | Performed by: PATHOLOGY

## 2025-06-17 PROCEDURE — 82306 VITAMIN D 25 HYDROXY: CPT | Performed by: INTERNAL MEDICINE

## 2025-06-17 PROCEDURE — 99000 SPECIMEN HANDLING OFFICE-LAB: CPT | Performed by: PATHOLOGY

## 2025-06-17 RX ORDER — METHYLPREDNISOLONE SODIUM SUCCINATE 40 MG/ML
40 INJECTION INTRAMUSCULAR; INTRAVENOUS
Status: CANCELLED
Start: 2026-06-17

## 2025-06-17 RX ORDER — ALBUTEROL SULFATE 0.83 MG/ML
2.5 SOLUTION RESPIRATORY (INHALATION)
Status: CANCELLED | OUTPATIENT
Start: 2026-06-17

## 2025-06-17 RX ORDER — ALBUTEROL SULFATE 90 UG/1
1-2 INHALANT RESPIRATORY (INHALATION)
Status: CANCELLED
Start: 2026-06-17

## 2025-06-17 RX ORDER — HEPARIN SODIUM (PORCINE) LOCK FLUSH IV SOLN 100 UNIT/ML 100 UNIT/ML
5 SOLUTION INTRAVENOUS
Status: CANCELLED | OUTPATIENT
Start: 2026-06-17

## 2025-06-17 RX ORDER — EPINEPHRINE 1 MG/ML
0.3 INJECTION, SOLUTION INTRAMUSCULAR; SUBCUTANEOUS EVERY 5 MIN PRN
Status: CANCELLED | OUTPATIENT
Start: 2026-06-17

## 2025-06-17 RX ORDER — HEPARIN SODIUM,PORCINE 10 UNIT/ML
5-20 VIAL (ML) INTRAVENOUS DAILY PRN
Status: CANCELLED | OUTPATIENT
Start: 2026-06-17

## 2025-06-17 RX ORDER — DIPHENHYDRAMINE HYDROCHLORIDE 50 MG/ML
50 INJECTION, SOLUTION INTRAMUSCULAR; INTRAVENOUS
Status: CANCELLED
Start: 2026-06-17

## 2025-06-17 RX ORDER — DIPHENHYDRAMINE HYDROCHLORIDE 50 MG/ML
25 INJECTION, SOLUTION INTRAMUSCULAR; INTRAVENOUS
Status: CANCELLED
Start: 2026-06-17

## 2025-06-17 RX ORDER — ZOLEDRONIC ACID 0.05 MG/ML
5 INJECTION, SOLUTION INTRAVENOUS ONCE
Status: CANCELLED
Start: 2026-06-17

## 2025-06-17 RX ORDER — ZOLEDRONIC ACID 0.05 MG/ML
5 INJECTION, SOLUTION INTRAVENOUS ONCE
Status: COMPLETED | OUTPATIENT
Start: 2025-06-17 | End: 2025-06-17

## 2025-06-17 RX ADMIN — ZOLEDRONIC ACID 5 MG: 0.05 INJECTION, SOLUTION INTRAVENOUS at 13:39

## 2025-06-17 ASSESSMENT — PAIN SCALES - GENERAL: PAINLEVEL_OUTOF10: MODERATE PAIN (5)

## 2025-06-17 NOTE — LETTER
"6/17/2025      Edson Thornton Jr.  [address]  [place] [address]      Dear Colleague,    Thank you for referring your patient, Edson Thornton Jr., to the [facility]. Please see a copy of my visit note below.    Transplant Coordinator Note    Reason for visit: Post lung transplant follow up visit   Coordinator: Present (Gisel in person)  Caregiver:  Not present    Health concerns addressed today:  1. Recent hospitalization 6/12-6/16 - pleuritic chest pain, O2 sats dropped with activity; O2 sats drop to 85-88% with activity, chest CT - new nodules found, positive fungitell lab during hospitalization  2. Respiratory: difficulty taking a deep breath - \"like something is sitting on me\" started 4-5 weeks ago; right chest pain when this happens; gets SOB with activity; no cough  3. GI/: good appetite; no concerns  4. Night sweats - not new for pt, off/on for years    Annuals 2/18/25     Activity/rehab: [facility]  Oxygen needs: Room air, BiPAP NOC   Pain management/RX: joint/bone pain (wrist and fingers), Some swelling. Using Volteran Gel.   Diabetic management: on lantus, occasional novolog  CMV status: D-/R-  EBV status: D+/R+  AC/asa: on coumadin, bridge to Lovenox for bronch (lower dose per CrCl d/t bleeding post bronch x 2)  PJP prophylactic: Bactrim     COVID:  COVID-19 infection (yes/no, date of most recent positive test): most recent positive test: 2/8 - not treated with remdesivir  Status/instructions given about COVID-19 vaccine:      Pt Education: medications (use/dose/side effects), how/when to call coordinator, frequency of labs, s/s of infection/rejection, call prior to starting any new medications, lab/vital sign book     Health Maintenance:   Last colonoscopy: 7/2020  Next colonoscopy due: 3-5 years, 7/2023-7/2025  Dermatology: sees dermatology annual locally - 11/2025  Vaccinations this visit:   Dexa: 2/18/25    Labs, CXR, PFTs reviewed with patient  Medication " record reviewed and reconciled  Questions and concerns addressed    Patient Instructions  1. Continue to hydrate with 60-70 oz fluids daily.  2. Continue to exercise daily or most days of the week.  3. Follow up with your primary care provider for annual gender health maintenance procedures.  4. Follow up with colonoscopy schedule.  5. Follow up with annual dermatology visits.  6. It doesn't seem like the COVID vaccine is working well in lung transplant patients. A number of lung transplant patients have gotten sick with COVID even after receiving the vaccines. Based on our recent experience, it can be life-threatening to get COVID  even after being vaccinated. Please continue to act like you did not get the COVID vaccine - social distancing, wearing a mask, good hand hygiene, etc. If the people around you are vaccinated, it will help reduce the risk of you getting COVID. All members of your household should be vaccinated.  7. Plan for 6 minute walk test and full PFTs today.  Depending on these results, we'll see if you need further steroids or a bronchoscopy.    Next transplant clinic appointment:  4 months with CXR, labs and PFTs  Next lab draw: pending tacrolimus level    AVS printed at time of check out        Reason for Visit  Edson Thornton Jr. is a 60 year old year old male who is being seen for Lung Transplant (Follow up visit)      Assessment and plan:   Edson Thornton is a 60 year old male with NSIP/ILD, bronchiectasis, moderate PH, RA, SALTY, chronic HSV infection, hypogammaglobulinemia, steroid-induced diabetes, hypothyroidism, PFO, HTN, HLD, duodenal anomaly, anxiety, and depression. Admitted on September 2021 for acute on chronic respiratory failure 2/2 ILD exacerbation now s/p BSLT on 10/16/21. Prolonged vent wean s/p trach and PEG/J tube.  Post-op course also complicated by encephalopathy and diffuse weakness, acute to subacute CVA, afib with RVR, BRENNAN, GI bleed, Candidemia/Candida empyema, and  "anxiety. Code called 11/2 for bradycardia/asystole, progressive hypotension, found to have significant GI bleed, EGD with adherent clot near PEG tube site that was clipped.  The patient had a positive crossmatch following transplant which was attributed to rituximab patient had received in June 2021 but subsequently has had intermittent positive DSA. His exercise tolerance and PFTs have not reached the level expected.  He has had numerous respiratory infections since transplant.    Pulmonary/lung transplant: The patient had influenza in January.  He had possible COVID-19 and pneumonia in February.  ID clinic follow-up in February he was noted to have wheezing and decreased PFTs, and was treated with a burst and taper prednisone with symptomatic improvement per chart notes.  He was then treated with doxycycline and prednisone in late April for cough, pleuritic chest pain and dyspnea on exertion.  He was then admitted to a local hospital in mid June for pleuritic chest pain and increased dyspnea on exertion.  CT PE study showed no evidence of PE and a small nodule in the right upper lobe.  No specific diagnosis was identified.  Patient was discharged from hospital yesterday.  He reports ongoing increased dyspnea on exertion and a sense that he cannot take a deep breath.  PFTs are not changed from February but are below his previous best with reductions in both FEV1 and FVC.  Chest x-ray, reviewed by me with no acute infiltrate, possibly some mild atelectasis at the right base, not significantly changed from previous.  Outside CT PE reviewed with no evidence of PE, expiratory images with contrast so difficult to assess parenchyma.  Because of the patient's increased respiratory symptoms, 6-minute walk and lung volumes were obtained after the visit and reviewed by me with decreased \"total lung capacity and decreased diffusing capacity compared with earlier in the year.  6-minute walk with no significant change in " oxygenation from earlier in the year.  In an effort to further evaluate, inspiratory chest CT will be pursued.  May need to consider bronchoscopy with lavage and possibly biopsy.  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  For now, continue current immunosuppression.  Tacrolimus will be adjusted to maintain a level of 8-10.  Continue Myfortic, previously changed from CellCept due to GI toxicity.  Continue current prednisone.  Prospera pending.      Positive crossmatch/DSA: The patient had a retrospective positive crossmatch following transplantation, attributed to rituximab received in June 2022.  The patient did have a positive DQB5 November 2021 through March 2023.  It had resolved for many months but reappeared in June 2024 but resolved again in December 2024..  At that time his cell free DNA was quite low.  Cell free DNA has been consistently low.  Pending today.      Date DSA mfi Biopsy (date) Treatment   10/17/2021  none         10/23/2021  none         11/1/2021  none         11/15/2021  none         11/29/2021  DQ B5  2522       12/6/2021  DQ B5  1873       12/12/2021  DQ B5  1703       12/27/2021  DQ B5  3924       1/3/2022  DQ B5  3072       1/10/2022  DQ B5  4032       1/17/2022  DQB5  1849       2/3/2022 DQB5   2488       2/7/2022 DQB5  1874       2/10/2022 DQB5  1781       3/15/2022 DQB5  2254       3/21/2022 DQB5  2117       4/18/2022 DQB5   908       6/20/2022  DQB5  1100       6/29/2022  DQB5  1411       9/12/2022 DQB5  1681       11/14/2022 DQB5  891       12/20/2022  DQB5  1553       3/8/2023  DQB5  551       4/25/2023 DQB5 None       8/1/2023 DQB5 None       11/14/23 DQB5 None       2/13/2024 None         6/5/2024 DQ B5 1921       10/15/2024 DQ B5 563      12/10/2024  DQ B5 None        2/18/2025 DQ B 5 None        CKD: Creatinine remains in the normal range.  Continue monitoring.      Prophylaxis: Continue Bactrim for PJP prophylaxis.     Hypogammaglobulinemia: IgG low at 356 in  March, monthly infusions initiated but delayed due to insurance concerns.    Rheumatoid arthritis: Follows with rheumatology.     Atrial fibrillation with RVR: The patient appears to be in sinus rhythm on exam today.  Continue metoprolol and warfarin.    Obstructive sleep apnea: Continue BiPAP    Hypertension: Blood pressure is elevated in clinic.  The patient reports it runs 130-160/76-93 at home.  Will monitor for now but may need to consider increase in antihypertensives.  Continue current amlodipine, propranolol (for tremor) and metoprolol.    Depression/anxiety: Working closely with his mental health providers. Continue Prozac and Lamictal.    Reflux: Well-controlled with current pantoprazole.    Steroid-induced diabetes:  Glucoses well controlled at home. Continue correction scale insulin.    Hypomagnesemia: Magnesium level is low at 1.5 but unchanged.  The patient is already on a fairly high replacement dose.  Continue monitoring.    Hypothyroidism: Continue Synthroid.      Bone health: Treated with Reclast 8/20/2025. Defer to endocrinology.  Bone density scan shows slight improvement.       Multiple acute/subacute ischemic strokes: etiology likely embolic vs perioperative. MRI brain with infarcts noted in both cerebral hemispheres, left cerebellum, presumed associated with new Afib vs perioperative. Bilateral upper extremity paresis (R>L), suspicion for some cortical blindness. Continue warfarin, HTN and BS control and rosuvastatin.      Hx Aspergillus/fungal infection: The patient was treated with a course of voriconazole in 2023 for Aspergillus.  Follow-up bronchoscopy grew penicillium and Fusarium but no Aspergillus.  CT was not suggestive of active fungal infection so voriconazole was discontinued.     Healthcare maintenance: Annual studies completed in February.      Abiodun GERARD, have spent 45 minutes on the day of the visit to review the chart, interview and examine the patient, review labs and  imaging, formulate a plan, document and submit orders. Time documented is excluding time spent for PFT interpretation.    The longitudinal plan of care for the diagnosis(es)/condition(s) as documented were addressed during this visit. Due to the added complexity in care, I will continue to support Bret in the subsequent management and with ongoing continuity of care.      Abiodun Woods MD  Contact via [employer]    Note: Chart documentation done in part with Dragon Voice Recognition software. Although reviewed after completion, some word and grammatical errors may remain. Please consider this when interpreting information in this chart.     Lung TX HPI  Transplants:  10/16/2021 (Lung), Postoperative day:  1345    The patient was seen and examined by Abiodun Woods MD   The patient influenza in January and then possible COVID and pneumonia in February.  At his February clinic visit he was treated with a prednisone burst and taper for ongoing dyspnea.  He was found to have hypogammaglobulinemia and IVIG was initiated in March.  In late April he received a course of doxycycline and prednisone for increased dyspnea on exertion cough and right pleuritic chest pain.  He was hospitalized late last week for pleuritic chest pain and dyspnea.  Evaluation included CT PE which showed no evidence of pulmonary embolism.  There was a 5 mm nodule in the right upper lobe with question of a halo.  Patient reports pain in the right chest radiating to the back.  Feels like inspiration is limited as if he cannot get a full breath.  This has been progressive for the past 4 weeks.  Also limited by right anterior pleuritic chest pain.  No relief with DuoNeb.  Breathing is comfortable at rest.  He does note increased dyspnea on exertion.  No cough.  No sputum.  No fever, chills.  He reports chronic night sweats which are intermittent and unchanged.    Review of systems:  Appetite is good  No ear pain, sore throat, sinus pain or  rhinorrhea  No palpitations  No nausea, vomiting, diarrhea abdominal pain  No myalgias or arthralgias.  Easy bruising, unchanged.  A complete ROS was otherwise negative except as noted in the HPI.    Current Outpatient Medications   Medication Sig Dispense Refill     acetaminophen (TYLENOL) 325 MG tablet 3 tablets (975 mg) by Oral or Feeding Tube route every 8 hours as needed for mild pain 100 tablet 11     amLODIPine (NORVASC) 10 MG tablet Take 1 tablet (10 mg) by mouth at bedtime. 30 tablet 11     Calcium Carbonate-Vitamin D (CALCIUM 600 +D HIGH POTENCY) 600-10 MG-MCG TABS 1 tablet by Oral or Feeding Tube route daily 30 tablet 11     calcium carbonate-vitamin D (CALTRATE) 600-10 MG-MCG per tablet Take 1 tablet by mouth daily.       diclofenac (VOLTAREN) 1 % topical gel Apply 4 g topically 4 times daily Both hands 150 g 11     famotidine (PEPCID) 20 MG tablet Take 20 mg by mouth 2 times daily.       ferrous sulfate (FEROSUL) 325 (65 Fe) MG tablet Take 1 tablet (325 mg) by mouth daily (with breakfast) 90 tablet 3     finasteride (PROSCAR) 5 MG tablet Take 5 mg by mouth daily       FLUoxetine (PROZAC) 40 MG capsule Take 40 mg by mouth daily.       fluticasone-vilanterol (BREO ELLIPTA) 200-25 MCG/ACT inhaler Inhale 1 puff into the lungs daily. 28 each 11     hydroxychloroquine (PLAQUENIL) 200 MG tablet Take 1 tablet (200 mg) by mouth 2 times daily 180 tablet 3     insulin aspart (NOVOLOG PEN) 100 UNIT/ML pen Take 1-3 units TID if BS over 200. Max daily dose is 12 15 mL 11     insulin pen needle (32G X 4 MM) 32G X 4 MM miscellaneous Use 4 pen needles daily or as directed. 120 each 11     ipratropium - albuterol 0.5 mg/2.5 mg/3 mL (DUONEB) 0.5-2.5 (3) MG/3ML neb solution Take 1 vial (3 mLs) by nebulization every 6 hours as needed for shortness of breath, wheezing or cough. 90 mL 3     lamoTRIgine (LAMICTAL) 200 MG tablet Take by mouth daily. Total of 250mg daily       levalbuterol (XOPENEX HFA) 45 MCG/ACT inhaler INHALE  2 PUFFS BY MOUTH EVERY 4 HOURS AS NEEDED FOR WHEEZING FOR SHORTNESS OF BREATH 15 g 0     magnesium glycinate 100 MG CAPS capsule Take 4 capsules (400 mg) by mouth 3 times daily       metoprolol succinate ER (TOPROL XL) 25 MG 24 hr tablet Take 25 mg by mouth daily.       mirtazapine (REMERON) 7.5 MG tablet Take 7.5 mg by mouth At Bedtime       multivitamin w/minerals (THERA-VIT-M) tablet Take 1 tablet by mouth daily 30 tablet 11     mycophenolic acid (GENERIC EQUIVALENT) 180 MG EC tablet Take 1 tablet (180 mg) by mouth 2 times daily. 60 tablet 11     ondansetron (ZOFRAN ODT) 4 MG ODT tab Take 1 tablet (4 mg) by mouth every 6 hours as needed for nausea. 30 tablet 3     pantoprazole (PROTONIX) 40 MG EC tablet Take 1 tablet (40 mg) by mouth 2 times daily (before meals). 60 tablet 11     predniSONE (DELTASONE) 2.5 MG tablet Take 1 tablet (2.5 mg) by mouth every evening Total dose: 5mg in AM and 2.5 mg in PM 30 tablet 11     predniSONE (DELTASONE) 5 MG tablet Take 1 tablet (5 mg) by mouth every morning AND 0.5 tablets (2.5 mg) every evening. 135 tablet 3     propranolol ER (INDERAL LA) 60 MG 24 hr capsule Take 60 mg by mouth daily.       rOPINIRole (REQUIP) 0.25 MG tablet Take 0.25 mg by mouth At Bedtime       rosuvastatin (CRESTOR) 10 MG tablet Take 1 tablet (10 mg) by mouth daily. On hold 1/30/25 for rising LFTs       senna-docusate (SENOKOT-S/PERICOLACE) 8.6-50 MG tablet Take 2 tablets by mouth 2 times daily as needed for constipation. 120 tablet 11     sildenafil (VIAGRA) 100 MG tablet Take 100 mg by mouth daily as needed.       sulfamethoxazole-trimethoprim (BACTRIM) 400-80 MG tablet Take 1 tablet by mouth daily. 30 tablet 11     tacrolimus (GENERIC EQUIVALENT) 0.5 MG capsule Take 1 capsule (0.5 mg) by mouth 2 times daily. Total dose: 1.5mg in AM and 1.5mg in  capsule 3     tacrolimus (GENERIC EQUIVALENT) 1 MG capsule Take 1 capsule (1 mg) by mouth 2 times daily. Total dose: 1.5mg in AM and 1.5mg in   capsule 3     warfarin ANTICOAGULANT (COUMADIN) 1 MG tablet TAKE 4 TO 5 TABLETS BY MOUTH ONCE DAILY AS DIRECTED BY  THE  COUMADIN  CLINIC. 420 tablet 0     warfarin ANTICOAGULANT (COUMADIN) 1 MG tablet Take 4 tablets (4 mg) Sun, Wed, Fri; and 5 tablets (5 mg) all other days of the week or as directed by Anticoagulation Clinic. 385 tablet 1     No current facility-administered medications for this visit.     No Known Allergies  Past Medical History:   Diagnosis Date     BRENNAN (acute kidney injury) 10/17/2021     Anxiety      Aspergillus (H) 03/08/2023     Depression      Family history of Parkinson disease 02/19/2025     HLD (hyperlipidemia)      HTN (hypertension)      Hypothyroidism      ILD (interstitial lung disease) (H)      Infection due to 2019 novel coronavirus 03/2023     Infection due to 2019 novel coronavirus 11/2023    Treated with remdesivir     Lung infection 07/12/2023     SALTY on CPAP      Oxygen dependent     BL 4L since ~6/2021     Primary hypertension 02/28/2022     Rheumatoid arthritis (H)     signs ~5/2020, dx 5/2021     S/P lung transplant (H) 10/16/2021     Shock liver 10/17/2021     Steroid-induced hyperglycemia      Traction bronchiectasis (H)      Tremor 01/08/2025       Past Surgical History:   Procedure Laterality Date     BRONCHOSCOPY (RIGID OR FLEXIBLE), DIAGNOSTIC N/A 1/26/2022    Procedure: BRONCHOSCOPY, WITH BRONCHOALVEOLAR LAVAGE AND BIOPSY;  Surgeon: Perlman, David Morris, MD;  Location: [facility]     BRONCHOSCOPY (RIGID OR FLEXIBLE), DIAGNOSTIC N/A 4/19/2022    Procedure: BRONCHOSCOPY, WITH BRONCHOALVEOLAR LAVAGE AND BIOPSY;  Surgeon: Sherine Merrill MD;  Location: [facility]     BRONCHOSCOPY (RIGID OR FLEXIBLE), DIAGNOSTIC N/A 6/21/2022    Procedure: BRONCHOSCOPY, WITH BRONCHOALVEOLAR LAVAGE AND BIOPSY;  Surgeon: Aneesh Rubio MD;  Location: [facility]     BRONCHOSCOPY FLEXIBLE AND RIGID N/A 11/15/2023    Procedure: Surveillance Bronchoscopy with airway check;  Surgeon: Ron Daniels MD;   Location: [facility]     COLONOSCOPY W/ BIOPSIES AND POLYPECTOMY  07/21/2020     CV CORONARY ANGIOGRAM N/A 09/08/2021    Procedure: Coronary Angiogram with possible intervention;  Surgeon: Jovon Bullock MD;  Location:  HE[facility] CARDIAC CATH LAB     CV RIGHT HEART CATH MEASUREMENTS RECORDED N/A 09/08/2021    Procedure: Right Heart Cath;  Surgeon: Jovon Bullock MD;  Location:  HE[facility] CARD[facility]AC CATH LAB     ESOPHAGOSCOPY, GASTROSCOPY, DUODENOSCOPY (EGD), COMBINED N/A 10/23/2021    Procedure: ESOPHAGOGASTRODUODENOSCOPY (EGD);  Surgeon: Miquel Pisano MD;  Location:  GI     ESOPHAGOSCOPY, GASTROSCOPY, DUODENOSCOPY (EGD), COMBINED N/A 11/02/2021    Procedure: ESOPHAGOGASTRODUODENOSCOPY (EGD);  Surgeon: Daniel Ortiz MD;  Location:  GI     ESOPHAGOSCOPY, GASTROSCOPY, DUODENOSCOPY (EGD), COMBINED N/A 11/5/2021    Procedure: ESOPHAGOGASTRODUODENOSCOPY (EGD);  Surgeon: Ronnell Hernandez MD;  Location: [facility]     EXTRACTION(S) DENTAL N/A 09/22/2021    Procedure: EXTRACTION tooth #19;  Surgeon: Deepak Tobin DDS;  Location: [facility]     HERNIA REPAIR       IR CHEST TUBE PLACEMENT NON-TUNNELLED LEFT  11/03/2021     PICC TRIPLE LUMEN PLACEMENT Left 11/04/2021    5FR TL PICC. Right non occlusive thrombus subclavian vein.     REPLACE GASTROJEJUNOSTOMY TUBE, PERCUTANEOUS N/A 11/9/2021    Procedure: REPLACEMENT, GASTROJEJUNOSTOMY TUBE, PERCUTANEOUS;  Surgeon: Hernando Rodriguez MD;  Location: [facility]     right acl       TRACHEOSTOMY PERCUTANEOUS N/A 10/29/2021    Procedure: Percutaneous Tracheostomy,;  Surgeon: Celine Jenkins MD;  Location: [facility]     TRANSPLANT LUNG RECIPIENT SINGLE X2 Bilateral 10/16/2021    Procedure: TRANSPLANT, LUNG, RECIPIENT, BILATERAL, Bronchoscopy, on-pump perfusion, bilateral clamshell sternotomy;  Surgeon: Yanick Corral MD;  Location: [facility]     XR ACROMIOCLAVICULAR JOINT BILATERAL         Social History     Socioeconomic History     Marital status: Single      Spouse name: Not on file     Number of children: Not on file     Years of education: Not on file     Highest education level: Not on file   Occupational History     Not on file   Tobacco Use     Smoking status: Never     Smokeless tobacco: Former     Tobacco comments:     Quit chewing over 20 years ago.   Vaping Use     Vaping status: Never Used   Substance and Sexual Activity     Alcohol use: Never     Drug use: Never     Sexual activity: Not on file   Other Topics Concern     Parent/sibling w/ CABG, MI or angioplasty before 65F 55M? Not Asked   Social History Narrative    Full time  for the [employer] starting May 24, 2023.     Social Drivers of Health     Financial Resource Strain: Low Risk  (9/20/2022)    Received from [facility]    Overall Financial Resource Strain (CARDIA)      Difficulty of Paying Living Expenses: Not very hard   Food Insecurity: No Food Insecurity (6/12/2025)    Received from [facility]    Hunger Vital Sign      Worried About Running Out of Food in the Last Year: Never true      Ran Out of Food in the Last Year: Never true   Transportation Needs: No Transportation Needs (6/12/2025)    Received from [facility]    PRAPARE - Transportation      In the past 12 months, has lack of transportation kept you from medical appointments or from getting medications?: No      In the past 12 months, has lack of transportation kept you from meetings, work, or from getting things needed for daily living?: No   Physical Activity: Insufficiently Active (9/20/2022)    Received from [facility]    Exercise Vital Sign      Days of Exercise per Week: 4 days      Minutes of Exercise per Session: 20 min   Stress: Stress Concern Present (8/25/2021)    Received from [facility]    [employer] [place] [employer] - Occupational Stress Questionnaire      Feeling of Stress : Rather much   Social  "Connections: Moderately Isolated (8/25/2021)    Received from [facility]    Social Connection and Isolation Panel [NHANES]      Frequency of Communication with Friends and Family: Twice a week      Frequency of Social Gatherings with Friends and Family: Never      Attends Roman [employer] Services: Never      Active Member of Clubs or Organizations: Yes      Attends Club or Organization Meetings: Never      Marital Status: Living with partner   Interpersonal Safety: Not on file   Housing Stability: Low Risk  (6/12/2025)    Received from [facility]    Housing Stability Vital Sign      Unable to Pay for Housing in the Last Year: No      Number of Times Moved in the Last Year: 0      Homeless in the Last Year: No         BP (!) 151/87   Pulse 57   Resp 20   Ht 1.626 m (5' 4\")   Wt 73.9 kg (163 lb)   SpO2 95%   BMI 27.98 kg/m    Body mass index is 27.98 kg/m .  Exam:   GENERAL APPEARANCE: Well developed, well nourished, alert, and in no apparent distress.  EYES: PERRL, EOMI  HENT: Nasal mucosa with no edema and no hyperemia. No nasal polyps.  EARS: Canals clear, TMs normal  MOUTH: Oral mucosa is moist, without any lesions, no tonsillar enlargement, no oropharyngeal exudate.  NECK: supple, no masses, no thyromegaly.  LYMPHATICS: No significant axillary, cervical, or supraclavicular nodes.  RESP: normal percussion, mildly diminished airflow throughout.  No crackles. No rhonchi. No wheezes.  CV: Normal S1, S2, regular rhythm, normal rate. No murmur.  No rub. No gallop. No LE edema.   ABDOMEN:  Bowel sounds normal, soft, nontender, no HSM or masses.   MS: extremities normal. No clubbing. No cyanosis.  SKIN: no rash on limited exam  NEURO: Mentation intact, speech normal, normal strength and tone, normal gait and stance  PSYCH: mentation appears normal. and affect normal/bright  Results:  Recent Results (from the past week)   INR (External Result)    Collection Time: 06/16/25 12:00 AM "   Result Value Ref Range    INR (External) 2.1    6 minute walk test    Collection Time: 06/17/25 12:00 AM   Result Value Ref Range    6 min walk (FT) 1,110 1,294 ft    6 Min Walk (M) 338 394 m   Comprehensive metabolic panel    Collection Time: 06/17/25 10:44 AM   Result Value Ref Range    Sodium 142 135 - 145 mmol/L    Potassium 4.1 3.4 - 5.3 mmol/L    Carbon Dioxide (CO2) 27 22 - 29 mmol/L    Anion Gap 11 7 - 15 mmol/L    Urea Nitrogen 16.2 8.0 - 23.0 mg/dL    Creatinine 0.99 0.67 - 1.17 mg/dL    GFR Estimate 87 >60 mL/min/1.73m2    Calcium 9.8 8.8 - 10.4 mg/dL    Chloride 104 98 - 107 mmol/L    Glucose 79 70 - 99 mg/dL    Alkaline Phosphatase 60 40 - 150 U/L    AST 32 0 - 45 U/L    ALT 34 0 - 70 U/L    Protein Total 7.2 6.4 - 8.3 g/dL    Albumin 4.6 3.5 - 5.2 g/dL    Bilirubin Total 0.4 <=1.2 mg/dL   Magnesium    Collection Time: 06/17/25 10:44 AM   Result Value Ref Range    Magnesium 1.5 (L) 1.7 - 2.3 mg/dL   Phosphorus    Collection Time: 06/17/25 10:44 AM   Result Value Ref Range    Phosphorus 2.4 (L) 2.5 - 4.5 mg/dL   TSH with free T4 reflex    Collection Time: 06/17/25 10:44 AM   Result Value Ref Range    TSH 6.22 (H) 0.30 - 4.20 uIU/mL   Vitamin D Deficiency (D3 Only)    Collection Time: 06/17/25 10:44 AM   Result Value Ref Range    Vitamin D, Total (25-Hydroxy) 39 20 - 50 ng/mL   Calcium    Collection Time: 06/17/25 10:44 AM   Result Value Ref Range    Calcium 9.8 8.8 - 10.4 mg/dL   Tacrolimus by Tandem Mass Spectrometry    Collection Time: 06/17/25 10:44 AM   Result Value Ref Range    Tacrolimus by Tandem Mass Spectrometry 8.0 5.0 - 15.0 ug/L    Tacrolimus Last Dose Date 6/16/2025     Tacrolimus Last Dose Time  8:00 PM    Cytomegalovirus DNA by PCR, Quantitative    Collection Time: 06/17/25 10:44 AM    Specimen: Arm, Right; Blood   Result Value Ref Range    CMV DNA IU/mL Not Detected Not Detected IU/mL    CMV Quantitative PCR Specimen Type Blood    Juarez Barr Virus Quantitative PCR, Plasma     Collection Time: 06/17/25 10:44 AM    Specimen: Arm, Right; Blood   Result Value Ref Range    EBV DNA IU/mL Not Detected Not Detected IU/mL   CBC with platelets and differential    Collection Time: 06/17/25 10:44 AM   Result Value Ref Range    WBC Count 9.4 4.0 - 11.0 10e3/uL    RBC Count 4.74 4.40 - 5.90 10e6/uL    Hemoglobin 14.7 13.3 - 17.7 g/dL    Hematocrit 42.7 40.0 - 53.0 %    MCV 90 78 - 100 fL    MCH 31.0 26.5 - 33.0 pg    MCHC 34.4 31.5 - 36.5 g/dL    RDW 13.0 10.0 - 15.0 %    Platelet Count 177 150 - 450 10e3/uL    % Neutrophils 70 %    % Lymphocytes 18 %    % Monocytes 9 %    % Eosinophils 1 %    % Basophils 1 %    % Immature Granulocytes 2 %    NRBCs per 100 WBC 0 <1 /100    Absolute Neutrophils 6.6 1.6 - 8.3 10e3/uL    Absolute Lymphocytes 1.7 0.8 - 5.3 10e3/uL    Absolute Monocytes 0.8 0.0 - 1.3 10e3/uL    Absolute Eosinophils 0.1 0.0 - 0.7 10e3/uL    Absolute Basophils 0.1 0.0 - 0.2 10e3/uL    Absolute Immature Granulocytes 0.1 <=0.4 10e3/uL    Absolute NRBCs 0.0 10e3/uL   Bilirubin direct    Collection Time: 06/17/25 10:44 AM   Result Value Ref Range    Bilirubin Direct 0.16 0.00 - 0.45 mg/dL   T4 free    Collection Time: 06/17/25 10:44 AM   Result Value Ref Range    Free T4 1.36 0.90 - 1.70 ng/dL   General PFT Lab (Please always keep checked)    Collection Time: 06/17/25 10:56 AM   Result Value Ref Range    FVC-Pred 3.44 L    FVC-Pre 2.05 L    FVC-%Pred-Pre 59 %    FEV1-Pre 1.27 L    FEV1-%Pred-Pre 46 %    FEV1FVC-Pred 79 %    FEV1FVC-Pre 62 %    FEFMax-Pred 7.92 L/sec    FEFMax-Pre 3.88 L/sec    FEFMax-%Pred-Pre 49 %    FEF2575-Pred 2.43 L/sec    FEF2575-Pre 0.61 L/sec    AXQ9425-%Pred-Pre 25 %    ExpTime-Pre 7.09 sec    FIFMax-Pre 4.36 L/sec    FEV1FEV6-Pred 79 %    FEV1FEV6-Pre 63 %    VC-Pred 3.84 L    VC-Pre 2.02 L    VC-%Pred-Pre 52 %    IC-Pred 2.51 L    IC-Pre 1.80 L    IC-%Pred-Pre 71 %    ERV-Pred 1.25 L    ERV-Pre 0.22 L    ERV-%Pred-Pre 17 %    FRCPleth-Pred 2.92 L    FRCPleth-Pre 2.47  L    FRCPleth-%Pred-Pre 84 %    RVPleth-Pred 2.22 L    RVPleth-Pre 2.25 L    RVPleth-%Pred-Pre 101 %    TLCPleth-Pred 5.91 L    TLCPleth-Pre 4.27 L    TLCPleth-%Pred-Pre 72 %    DLCOunc-Pred 23.02 ml/min/mmHg    DLCOunc-Pre 14.41 ml/min/mmHg    DLCOunc-%Pred-Pre 62 %    DLCOcor-Pre 14.37 ml/min/mmHg    DLCOcor-%Pred-Pre 62 %    VA-Pre 3.15 L    VA-%Pred-Pre 59 %    FEV1SVC-Pred 71 %    FEV1SVC-Pre 63 %                         Results as noted above.    PFT Interpretation:  Moderate obstructive ventilatory defect , possible restrictive component with reduced total lung capacity.  Unchanged from previous.   Below recent best.   Valid Maneuver    Moderately reduced total lung capacity, decreased from previous.  Normal residual volume, not significantly changed from previous.  Moderate reduced corrected diffusing capacity, decreased from previous.    6-minute walk test: Patient walked 1110 feet (lower limit of normal 1294 feet) oxygen saturation decreased from 95% to 91%.  Walk distance is decreased from February.  No significant change in oxygenation compared to February 2025.                      Again, thank you for allowing me to participate in the care of your patient.        Sincerely,        Abiodun Woods MD    Electronically signed

## 2025-06-17 NOTE — PROGRESS NOTES
Infusion Nursing Note:  Edson Thornton Jr. presents today for Reclast.    Patient seen by provider today: Yes: Dr. Woods   present during visit today: Not Applicable.    Note:   Reclast given over 30 min.    Intravenous Access:  Peripheral IV placed.    Treatment Conditions:  Lab Results   Component Value Date    CR 0.99 06/17/2025    ERIK 9.8 06/17/2025     Cr clearance = 83 ml/min  Results reviewed, labs MET treatment parameters, ok to proceed with treatment.    Patient confirmed he's taking Ca and Vit D supplements    Administrations This Visit       zoledronic acid (RECLAST) infusion 5 mg       Admin Date  06/17/2025 Action  $New Bag Dose  5 mg Rate  200 mL/hr Route  Intravenous Documented By  Opal Elias, TIFFANY                  Post Infusion Assessment:  Patient tolerated infusion without incident.  Blood return noted pre and post infusion.  Site patent and intact, free from redness, edema or discomfort.  No evidence of extravasations.  Access discontinued per protocol.       Discharge Plan:   Discharge instructions reviewed with: Patient.  Patient and/or family verbalized understanding of discharge instructions and all questions answered.  Copy of AVS reviewed with patient and/or family.  Patient discharged in stable condition accompanied by: grandson.  Departure Mode: Ambulatory.      Opal Elias, RN

## 2025-06-17 NOTE — PATIENT INSTRUCTIONS
Dear Edson Thornton Jr.    Thank you for choosing [facility] ([facility]) for your infusion.  The following information is a summary of our appointment as well as important reminders.      If you have any questions on your upcoming Specialty Infusion appointments, please call scheduling at [phone].  It was a pleasure taking care of you today.    Sincerely,    [facility]  [address] [place] [address]  [address]  Phone:  [phone]

## 2025-06-17 NOTE — NURSING NOTE
"Chief Complaint   Patient presents with    Lung Transplant     Follow up visit     Miguel Montemayor, CMA CMA at 11:59 AM on 6/17/2025     BP (!) 151/87   Pulse 57   Resp 20   Ht 1.626 m (5' 4\")   Wt 73.9 kg (163 lb)   SpO2 95%   BMI 27.98 kg/m      "

## 2025-06-17 NOTE — PROGRESS NOTES
"Transplant Coordinator Note    Reason for visit: Post lung transplant follow up visit   Coordinator: Present (Gisel in person)  Caregiver:  Not present    Health concerns addressed today:  1. Recent hospitalization 6/12-6/16 - pleuritic chest pain, O2 sats dropped with activity; O2 sats drop to 85-88% with activity, chest CT - new nodules found, positive fungitell lab during hospitalization  2. Respiratory: difficulty taking a deep breath - \"like something is sitting on me\" started 4-5 weeks ago; right chest pain when this happens; gets SOB with activity; no cough  3. GI/: good appetite; no concerns  4. Night sweats - not new for pt, off/on for years    Annuals 2/18/25     Activity/rehab: Up ad [place]  Oxygen needs: Room air, BiPAP NOC   Pain management/RX: joint/bone pain (wrist and fingers), Some swelling. Using Volteran Gel.   Diabetic management: on lantus, occasional novolog  CMV status: D-/R-  EBV status: D+/R+  AC/asa: on coumadin, bridge to Lovenox for bronch (lower dose per CrCl d/t bleeding post bronch x 2)  PJP prophylactic: Bactrim     COVID:  COVID-19 infection (yes/no, date of most recent positive test): most recent positive test: 2/8 - not treated with remdesivir  Status/instructions given about COVID-19 vaccine:      Pt Education: medications (use/dose/side effects), how/when to call coordinator, frequency of labs, s/s of infection/rejection, call prior to starting any new medications, lab/vital sign book     Health Maintenance:   Last colonoscopy: 7/2020  Next colonoscopy due: 3-5 years, 7/2023-7/2025  Dermatology: sees dermatology annual locally - 11/2025  Vaccinations this visit:   Dexa: 2/18/25    Labs, CXR, PFTs reviewed with patient  Medication record reviewed and reconciled  Questions and concerns addressed    Patient Instructions  1. Continue to hydrate with 60-70 oz fluids daily.  2. Continue to exercise daily or most days of the week.  3. Follow up with your primary care provider for annual " gender health maintenance procedures.  4. Follow up with colonoscopy schedule.  5. Follow up with annual dermatology visits.  6. It doesn't seem like the COVID vaccine is working well in lung transplant patients. A number of lung transplant patients have gotten sick with COVID even after receiving the vaccines. Based on our recent experience, it can be life-threatening to get COVID  even after being vaccinated. Please continue to act like you did not get the COVID vaccine - social distancing, wearing a mask, good hand hygiene, etc. If the people around you are vaccinated, it will help reduce the risk of you getting COVID. All members of your household should be vaccinated.  7. Plan for 6 minute walk test and full PFTs today.  Depending on these results, we'll see if you need further steroids or a bronchoscopy.    Next transplant clinic appointment:  4 months with CXR, labs and PFTs  Next lab draw: pending tacrolimus level    AVS printed at time of check out

## 2025-06-17 NOTE — PATIENT INSTRUCTIONS
Patient Instructions  1. Continue to hydrate with 60-70 oz fluids daily.  2. Continue to exercise daily or most days of the week.  3. Follow up with your primary care provider for annual gender health maintenance procedures.  4. Follow up with colonoscopy schedule.  5. Follow up with annual dermatology visits.  6. It doesn't seem like the COVID vaccine is working well in lung transplant patients. A number of lung transplant patients have gotten sick with COVID even after receiving the vaccines. Based on our recent experience, it can be life-threatening to get COVID  even after being vaccinated. Please continue to act like you did not get the COVID vaccine - social distancing, wearing a mask, good hand hygiene, etc. If the people around you are vaccinated, it will help reduce the risk of you getting COVID. All members of your household should be vaccinated.  7. Plan for 6 minute walk test and full PFTs today.  Depending on these results, we'll see if you need further steroids or a bronchoscopy.    Next transplant clinic appointment:  4 months with CXR, labs and PFTs  Next lab draw: pending tacrolimus level    ~~~~~~~~~~~~~~~~~~~~~~~~~    Thoracic Transplant Office phone [phone] (alt [phone]), fax [phone]    Office Hours 8:30 - 5:00     For after-hours urgent issues, please dial [phone] (alt [phone]) and ask the  to page the Thoracic Transplant Coordinator On-Call.   --------------------  To expedite your medication refill(s), please contact your pharmacy and have them fax a refill request to: [phone]    *Please allow 3 business days for routine medication refills.  *Please allow 5 business days for controlled substance medication refills.    **For Diabetic medications and supplies refill(s), please contact your pharmacy and have them contact your Endocrine team.  --------------------  For scheduling appointments call [phone] (alt [phone])  --------------------  Please  Note: If you are active on [email or web address], all future test results will be sent by [email or web address] message only, and will no longer be called to patient. You may also receive communication directly from your physician.

## 2025-06-18 ENCOUNTER — TELEPHONE (OUTPATIENT)
Dept: TRANSPLANT | Facility: CLINIC | Age: 60
End: 2025-06-18
Payer: COMMERCIAL

## 2025-06-18 DIAGNOSIS — Z94.2 S/P LUNG TRANSPLANT (H): ICD-10-CM

## 2025-06-18 LAB
CMV DNA SPEC NAA+PROBE-ACNC: NOT DETECTED IU/ML
EBV DNA SERPL NAA+PROBE-ACNC: NOT DETECTED IU/ML
SPECIMEN TYPE: NORMAL

## 2025-06-18 NOTE — TELEPHONE ENCOUNTER
Lois from [facility], stated they received an order for patient, they have a protocol that all orders need to include patients gender, requesting a new order be sent with patient's gender added fax number provided is [phone]

## 2025-06-19 ENCOUNTER — DOCUMENTATION ONLY (OUTPATIENT)
Dept: ANTICOAGULATION | Facility: CLINIC | Age: 60
End: 2025-06-19
Payer: COMMERCIAL

## 2025-06-19 ENCOUNTER — TELEPHONE (OUTPATIENT)
Dept: ANTICOAGULATION | Facility: CLINIC | Age: 60
End: 2025-06-19
Payer: COMMERCIAL

## 2025-06-19 DIAGNOSIS — I63.9 ISCHEMIC STROKE (H): ICD-10-CM

## 2025-06-19 DIAGNOSIS — I48.91 ATRIAL FIBRILLATION, UNSPECIFIED TYPE (H): Primary | ICD-10-CM

## 2025-06-19 DIAGNOSIS — Z94.2 S/P LUNG TRANSPLANT (H): Chronic | ICD-10-CM

## 2025-06-19 DIAGNOSIS — Z79.899 ENCOUNTER FOR LONG-TERM (CURRENT) USE OF HIGH-RISK MEDICATION: ICD-10-CM

## 2025-06-19 DIAGNOSIS — Z94.2 S/P LUNG TRANSPLANT (H): ICD-10-CM

## 2025-06-19 DIAGNOSIS — D84.9 IMMUNOSUPPRESSED STATUS: ICD-10-CM

## 2025-06-19 NOTE — TELEPHONE ENCOUNTER
ANTICOAGULATION  MANAGEMENT: Discharge Review    Edson Thornton Jr. chart reviewed for anticoagulation continuity of care    Hospital Admission on 6/12-6/16/25 for community acquired pneumonia.    Discharge disposition: Home    Results:    Recent labs: (last 7 days)     06/16/25  0000   INR 2.1     Anticoagulation inpatient management:     anticoagulation calendar updated with inpatient dosing    Anticoagulation discharge instructions:     Warfarin dosing: home regimen continued   Bridging: No   INR goal change: No      Medication changes affecting anticoagulation: Yes: Patient was discharged on 6 more days of Doxycyline-patient to complete a day day course. Patient to complete 3 more days of Cefuroxime today.  Patient had a office visit today and Lamictal dose @ hs decreasing 50mg.   According to the literature this should not interact with warfarin.     Additional factors affecting anticoagulation: No     PLAN     Agree with discharge plan for follow up on 6/23/25    Left a detailed message for Woody    Anticoagulation Calendar updated    Terrie Cedillo RN  6/19/2025  Anticoagulation Clinic  [facility] for routing messages: [facility]  [facility] patient phone line: [phone]

## 2025-06-19 NOTE — PROGRESS NOTES
6/19/25 Dr. Woods signed orders. Orders sent to outside lab.    ANTICOAGULATION CLINIC REFERRAL RENEWAL REQUEST       An annual renewal order is required for all patients referred to [facility].?  Please review and sign the pended referral order for Edson Thornton Jr..       ANTICOAGULATION SUMMARY      Warfarin indication(s)   Atrial Fibrillation    Mechanical heart valve present?  NO       Current goal range   INR: 2.0-3.0     Goal appropriate for indication? Goal INR 2-3, standard for indication(s) above     Time in Therapeutic Range (TTR)  (Goal > 60%) NO - 52.1%%       Office visit with referring provider's group within last year yes on 2/18/25   Additional standing orders None     Once orders signed, fax to [facility] Fax [phone]     Candida Gibson RN  [facility]

## 2025-06-22 NOTE — PROGRESS NOTES
Reason for Visit  Edson Thornton Jr. is a 60 year old year old male who is being seen for Lung Transplant (Follow up visit)      Assessment and plan:   Edson Thornton is a 60 year old male with NSIP/ILD, bronchiectasis, moderate PH, RA, SALTY, chronic HSV infection, hypogammaglobulinemia, steroid-induced diabetes, hypothyroidism, PFO, HTN, HLD, duodenal anomaly, anxiety, and depression. Admitted on September 2021 for acute on chronic respiratory failure 2/2 ILD exacerbation now s/p BSLT on 10/16/21. Prolonged vent wean s/p trach and PEG/J tube.  Post-op course also complicated by encephalopathy and diffuse weakness, acute to subacute CVA, afib with RVR, BRENNAN, GI bleed, Candidemia/Candida empyema, and anxiety. Code called 11/2 for bradycardia/asystole, progressive hypotension, found to have significant GI bleed, EGD with adherent clot near PEG tube site that was clipped.  The patient had a positive crossmatch following transplant which was attributed to rituximab patient had received in June 2021 but subsequently has had intermittent positive DSA. His exercise tolerance and PFTs have not reached the level expected.  He has had numerous respiratory infections since transplant.    Pulmonary/lung transplant: The patient had influenza in January.  He had possible COVID-19 and pneumonia in February.  ID clinic follow-up in February he was noted to have wheezing and decreased PFTs, and was treated with a burst and taper prednisone with symptomatic improvement per chart notes.  He was then treated with doxycycline and prednisone in late April for cough, pleuritic chest pain and dyspnea on exertion.  He was then admitted to a local hospital in mid June for pleuritic chest pain and increased dyspnea on exertion.  CT PE study showed no evidence of PE and a small nodule in the right upper lobe.  No specific diagnosis was identified.  Patient was discharged from hospital yesterday.  He reports ongoing increased dyspnea on  "exertion and a sense that he cannot take a deep breath.  PFTs are not changed from February but are below his previous best with reductions in both FEV1 and FVC.  Chest x-ray, reviewed by me with no acute infiltrate, possibly some mild atelectasis at the right base, not significantly changed from previous.  Outside CT PE reviewed with no evidence of PE, expiratory images with contrast so difficult to assess parenchyma.  Because of the patient's increased respiratory symptoms, 6-minute walk and lung volumes were obtained after the visit and reviewed by me with decreased \"total lung capacity and decreased diffusing capacity compared with earlier in the year.  6-minute walk with no significant change in oxygenation from earlier in the year.  In an effort to further evaluate, inspiratory chest CT will be pursued.  May need to consider bronchoscopy with lavage and possibly biopsy.  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  For now, continue current immunosuppression.  Tacrolimus will be adjusted to maintain a level of 8-10.  Continue Myfortic, previously changed from CellCept due to GI toxicity.  Continue current prednisone.  Prospera pending.      Positive crossmatch/DSA: The patient had a retrospective positive crossmatch following transplantation, attributed to rituximab received in June 2022.  The patient did have a positive DQB5 November 2021 through March 2023.  It had resolved for many months but reappeared in June 2024 but resolved again in December 2024..  At that time his cell free DNA was quite low.  Cell free DNA has been consistently low.  Pending today.      Date DSA mfi Biopsy (date) Treatment   10/17/2021  none         10/23/2021  none         11/1/2021  none         11/15/2021  none         11/29/2021  DQ B5  2522       12/6/2021  DQ B5  1873       12/12/2021  DQ B5  1703       12/27/2021  DQ B5  3924       1/3/2022  DQ B5  3072       1/10/2022  DQ B5  4032       1/17/2022  DQB5  1849   "     2/3/2022 DQB5   2488       2/7/2022 DQB5  1874       2/10/2022 DQB5  1781       3/15/2022 DQB5  2254       3/21/2022 DQB5  2117       4/18/2022 DQB5   908       6/20/2022  DQB5  1100       6/29/2022  DQB5  1411       9/12/2022 DQB5  1681       11/14/2022 DQB5  891       12/20/2022  DQB5  1553       3/8/2023  DQB5  551       4/25/2023 DQB5 None       8/1/2023 DQB5 None       11/14/23 DQB5 None       2/13/2024 None         6/5/2024 DQ B5 1921       10/15/2024 DQ B5 563      12/10/2024  DQ B5 None        2/18/2025 DQ B 5 None        CKD: Creatinine remains in the normal range.  Continue monitoring.      Prophylaxis: Continue Bactrim for PJP prophylaxis.     Hypogammaglobulinemia: IgG low at 356 in March, monthly infusions initiated but delayed due to insurance concerns.    Rheumatoid arthritis: Follows with rheumatology.     Atrial fibrillation with RVR: The patient appears to be in sinus rhythm on exam today.  Continue metoprolol and warfarin.    Obstructive sleep apnea: Continue BiPAP    Hypertension: Blood pressure is elevated in clinic.  The patient reports it runs 130-160/76-93 at home.  Will monitor for now but may need to consider increase in antihypertensives.  Continue current amlodipine, propranolol (for tremor) and metoprolol.    Depression/anxiety: Working closely with his mental health providers. Continue Prozac and Lamictal.    Reflux: Well-controlled with current pantoprazole.    Steroid-induced diabetes:  Glucoses well controlled at home. Continue correction scale insulin.    Hypomagnesemia: Magnesium level is low at 1.5 but unchanged.  The patient is already on a fairly high replacement dose.  Continue monitoring.    Hypothyroidism: Continue Synthroid.      Bone health: Treated with Reclast 8/20/2025. Defer to endocrinology.  Bone density scan shows slight improvement.       Multiple acute/subacute ischemic strokes: etiology likely embolic vs perioperative. MRI brain with infarcts noted in both  cerebral hemispheres, left cerebellum, presumed associated with new Afib vs perioperative. Bilateral upper extremity paresis (R>L), suspicion for some cortical blindness. Continue warfarin, HTN and BS control and rosuvastatin.      Hx Aspergillus/fungal infection: The patient was treated with a course of voriconazole in 2023 for Aspergillus.  Follow-up bronchoscopy grew penicillium and Fusarium but no Aspergillus.  CT was not suggestive of active fungal infection so voriconazole was discontinued.     Healthcare maintenance: Annual studies completed in February.      Abiodun GERARD, have spent 45 minutes on the day of the visit to review the chart, interview and examine the patient, review labs and imaging, formulate a plan, document and submit orders. Time documented is excluding time spent for PFT interpretation.    The longitudinal plan of care for the diagnosis(es)/condition(s) as documented were addressed during this visit. Due to the added complexity in care, I will continue to support Bret in the subsequent management and with ongoing continuity of care.      Abiodun Woods MD  Contact via [employer]    Note: Chart documentation done in part with Dragon Voice Recognition software. Although reviewed after completion, some word and grammatical errors may remain. Please consider this when interpreting information in this chart.     Lung TX HPI  Transplants:  10/16/2021 (Lung), Postoperative day:  1345    The patient was seen and examined by Abiodun Woods MD   The patient influenza in January and then possible COVID and pneumonia in February.  At his February clinic visit he was treated with a prednisone burst and taper for ongoing dyspnea.  He was found to have hypogammaglobulinemia and IVIG was initiated in March.  In late April he received a course of doxycycline and prednisone for increased dyspnea on exertion cough and right pleuritic chest pain.  He was hospitalized late last week for pleuritic chest  pain and dyspnea.  Evaluation included CT PE which showed no evidence of pulmonary embolism.  There was a 5 mm nodule in the right upper lobe with question of a halo.  Patient reports pain in the right chest radiating to the back.  Feels like inspiration is limited as if he cannot get a full breath.  This has been progressive for the past 4 weeks.  Also limited by right anterior pleuritic chest pain.  No relief with DuoNeb.  Breathing is comfortable at rest.  He does note increased dyspnea on exertion.  No cough.  No sputum.  No fever, chills.  He reports chronic night sweats which are intermittent and unchanged.    Review of systems:  Appetite is good  No ear pain, sore throat, sinus pain or rhinorrhea  No palpitations  No nausea, vomiting, diarrhea abdominal pain  No myalgias or arthralgias.  Easy bruising, unchanged.  A complete ROS was otherwise negative except as noted in the HPI.    Current Outpatient Medications   Medication Sig Dispense Refill    acetaminophen (TYLENOL) 325 MG tablet 3 tablets (975 mg) by Oral or Feeding Tube route every 8 hours as needed for mild pain 100 tablet 11    amLODIPine (NORVASC) 10 MG tablet Take 1 tablet (10 mg) by mouth at bedtime. 30 tablet 11    Calcium Carbonate-Vitamin D (CALCIUM 600 +D HIGH POTENCY) 600-10 MG-MCG TABS 1 tablet by Oral or Feeding Tube route daily 30 tablet 11    calcium carbonate-vitamin D (CALTRATE) 600-10 MG-MCG per tablet Take 1 tablet by mouth daily.      diclofenac (VOLTAREN) 1 % topical gel Apply 4 g topically 4 times daily Both hands 150 g 11    famotidine (PEPCID) 20 MG tablet Take 20 mg by mouth 2 times daily.      ferrous sulfate (FEROSUL) 325 (65 Fe) MG tablet Take 1 tablet (325 mg) by mouth daily (with breakfast) 90 tablet 3    finasteride (PROSCAR) 5 MG tablet Take 5 mg by mouth daily      FLUoxetine (PROZAC) 40 MG capsule Take 40 mg by mouth daily.      fluticasone-vilanterol (BREO ELLIPTA) 200-25 MCG/ACT inhaler Inhale 1 puff into the lungs  daily. 28 each 11    hydroxychloroquine (PLAQUENIL) 200 MG tablet Take 1 tablet (200 mg) by mouth 2 times daily 180 tablet 3    insulin aspart (NOVOLOG PEN) 100 UNIT/ML pen Take 1-3 units TID if BS over 200. Max daily dose is 12 15 mL 11    insulin pen needle (32G X 4 MM) 32G X 4 MM miscellaneous Use 4 pen needles daily or as directed. 120 each 11    ipratropium - albuterol 0.5 mg/2.5 mg/3 mL (DUONEB) 0.5-2.5 (3) MG/3ML neb solution Take 1 vial (3 mLs) by nebulization every 6 hours as needed for shortness of breath, wheezing or cough. 90 mL 3    lamoTRIgine (LAMICTAL) 200 MG tablet Take by mouth daily. Total of 250mg daily      levalbuterol (XOPENEX HFA) 45 MCG/ACT inhaler INHALE 2 PUFFS BY MOUTH EVERY 4 HOURS AS NEEDED FOR WHEEZING FOR SHORTNESS OF BREATH 15 g 0    magnesium glycinate 100 MG CAPS capsule Take 4 capsules (400 mg) by mouth 3 times daily      metoprolol succinate ER (TOPROL XL) 25 MG 24 hr tablet Take 25 mg by mouth daily.      mirtazapine (REMERON) 7.5 MG tablet Take 7.5 mg by mouth At Bedtime      multivitamin w/minerals (THERA-VIT-M) tablet Take 1 tablet by mouth daily 30 tablet 11    mycophenolic acid (GENERIC EQUIVALENT) 180 MG EC tablet Take 1 tablet (180 mg) by mouth 2 times daily. 60 tablet 11    ondansetron (ZOFRAN ODT) 4 MG ODT tab Take 1 tablet (4 mg) by mouth every 6 hours as needed for nausea. 30 tablet 3    pantoprazole (PROTONIX) 40 MG EC tablet Take 1 tablet (40 mg) by mouth 2 times daily (before meals). 60 tablet 11    predniSONE (DELTASONE) 2.5 MG tablet Take 1 tablet (2.5 mg) by mouth every evening Total dose: 5mg in AM and 2.5 mg in PM 30 tablet 11    predniSONE (DELTASONE) 5 MG tablet Take 1 tablet (5 mg) by mouth every morning AND 0.5 tablets (2.5 mg) every evening. 135 tablet 3    propranolol ER (INDERAL LA) 60 MG 24 hr capsule Take 60 mg by mouth daily.      rOPINIRole (REQUIP) 0.25 MG tablet Take 0.25 mg by mouth At Bedtime      rosuvastatin (CRESTOR) 10 MG tablet Take 1  tablet (10 mg) by mouth daily. On hold 1/30/25 for rising LFTs      senna-docusate (SENOKOT-S/PERICOLACE) 8.6-50 MG tablet Take 2 tablets by mouth 2 times daily as needed for constipation. 120 tablet 11    sildenafil (VIAGRA) 100 MG tablet Take 100 mg by mouth daily as needed.      sulfamethoxazole-trimethoprim (BACTRIM) 400-80 MG tablet Take 1 tablet by mouth daily. 30 tablet 11    tacrolimus (GENERIC EQUIVALENT) 0.5 MG capsule Take 1 capsule (0.5 mg) by mouth 2 times daily. Total dose: 1.5mg in AM and 1.5mg in  capsule 3    tacrolimus (GENERIC EQUIVALENT) 1 MG capsule Take 1 capsule (1 mg) by mouth 2 times daily. Total dose: 1.5mg in AM and 1.5mg in  capsule 3    warfarin ANTICOAGULANT (COUMADIN) 1 MG tablet TAKE 4 TO 5 TABLETS BY MOUTH ONCE DAILY AS DIRECTED BY  THE  COUMADIN  CLINIC. 420 tablet 0    warfarin ANTICOAGULANT (COUMADIN) 1 MG tablet Take 4 tablets (4 mg) Sun, Wed, Fri; and 5 tablets (5 mg) all other days of the week or as directed by Anticoagulation Clinic. 385 tablet 1     No current facility-administered medications for this visit.     No Known Allergies  Past Medical History:   Diagnosis Date    BRENNAN (acute kidney injury) 10/17/2021    Anxiety     Aspergillus (H) 03/08/2023    Depression     Family history of Parkinson disease 02/19/2025    HLD (hyperlipidemia)     HTN (hypertension)     Hypothyroidism     ILD (interstitial lung disease) (H)     Infection due to 2019 novel coronavirus 03/2023    Infection due to 2019 novel coronavirus 11/2023    Treated with remdesivir    Lung infection 07/12/2023    SALTY on CPAP     Oxygen dependent     BL 4L since ~6/2021    Primary hypertension 02/28/2022    Rheumatoid arthritis (H)     signs ~5/2020, dx 5/2021    S/P lung transplant (H) 10/16/2021    Shock liver 10/17/2021    Steroid-induced hyperglycemia     Traction bronchiectasis (H)     Tremor 01/08/2025       Past Surgical History:   Procedure Laterality Date    BRONCHOSCOPY (RIGID OR FLEXIBLE),  DIAGNOSTIC N/A 1/26/2022    Procedure: BRONCHOSCOPY, WITH BRONCHOALVEOLAR LAVAGE AND BIOPSY;  Surgeon: Perlman, David Morris, MD;  Location:  GI    BRONCHOSCOPY (RIGID OR FLEXIBLE), DIAGNOSTIC N/A 4/19/2022    Procedure: BRONCHOSCOPY, WITH BRONCHOALVEOLAR LAVAGE AND BIOPSY;  Surgeon: Sherine Merrill MD;  Location:  GI    BRONCHOSCOPY (RIGID OR FLEXIBLE), DIAGNOSTIC N/A 6/21/2022    Procedure: BRONCHOSCOPY, WITH BRONCHOALVEOLAR LAVAGE AND BIOPSY;  Surgeon: Aneesh Rubio MD;  Location:  GI    BRONCHOSCOPY FLEXIBLE AND RIGID N/A 11/15/2023    Procedure: Surveillance Bronchoscopy with airway check;  Surgeon: Ron Daniels MD;  Location:  GI    COLONOSCOPY W/ BIOPSIES AND POLYPECTOMY  07/21/2020    CV CORONARY ANGIOGRAM N/A 09/08/2021    Procedure: Coronary Angiogram with possible intervention;  Surgeon: Jovon Bullock MD;  Location:  HEART CARDIAC CATH LAB    CV RIGHT HEART CATH MEASUREMENTS RECORDED N/A 09/08/2021    Procedure: Right Heart Cath;  Surgeon: Jovon Bullock MD;  Location:  HEART CARDIAC CATH LAB    ESOPHAGOSCOPY, GASTROSCOPY, DUODENOSCOPY (EGD), COMBINED N/A 10/23/2021    Procedure: ESOPHAGOGASTRODUODENOSCOPY (EGD);  Surgeon: Miquel Pisano MD;  Location:  GI    ESOPHAGOSCOPY, GASTROSCOPY, DUODENOSCOPY (EGD), COMBINED N/A 11/02/2021    Procedure: ESOPHAGOGASTRODUODENOSCOPY (EGD);  Surgeon: Daniel Ortiz MD;  Location:  GI    ESOPHAGOSCOPY, GASTROSCOPY, DUODENOSCOPY (EGD), COMBINED N/A 11/5/2021    Procedure: ESOPHAGOGASTRODUODENOSCOPY (EGD);  Surgeon: Ronnell Hernandez MD;  Location:  GI    EXTRACTION(S) DENTAL N/A 09/22/2021    Procedure: EXTRACTION tooth #19;  Surgeon: Deepak Tobin DDS;  Location:  OR    HERNIA REPAIR      IR CHEST TUBE PLACEMENT NON-TUNNELLED LEFT  11/03/2021    PICC TRIPLE LUMEN PLACEMENT Left 11/04/2021    5FR TL PICC. Right non occlusive thrombus subclavian vein.    REPLACE GASTROJEJUNOSTOMY TUBE, PERCUTANEOUS  N/A 11/9/2021    Procedure: REPLACEMENT, GASTROJEJUNOSTOMY TUBE, PERCUTANEOUS;  Surgeon: Hernando Rodriguez MD;  Location: [facility] GI    right acl      TRACHEOSTOMY PERCUTANEOUS N/A 10/29/2021    Procedure: Percutaneous Tracheostomy,;  Surgeon: Celine Jenkins MD;  Location: [facility] OR    TRANSPLANT LUNG RECIPIENT SINGLE X2 Bilateral 10/16/2021    Procedure: TRANSPLANT, LUNG, RECIPIENT, BILATERAL, Bronchoscopy, on-pump perfusion, bilateral clamshell sternotomy;  Surgeon: Yanick Corral MD;  Location: [facility] OR    XR ACROMIOCLAVICULAR JOINT BILATERAL         Social History     Socioeconomic History    Marital status: Single     Spouse name: Not on file    Number of children: Not on file    Years of education: Not on file    Highest education level: Not on file   Occupational History    Not on file   Tobacco Use    Smoking status: Never    Smokeless tobacco: Former    Tobacco comments:     Quit chewing over 20 years ago.   Vaping Use    Vaping status: Never Used   Substance and Sexual Activity    Alcohol use: Never    Drug use: Never    Sexual activity: Not on file   Other Topics Concern    Parent/sibling w/ CABG, MI or angioplasty before 65F 55M? Not Asked   Social History Narrative    Full time  for the [employer] starting May 24, 2023.     Social Drivers of Health     Financial Resource Strain: Low Risk  (9/20/2022)    Received from [facility]    Overall Financial Resource Strain (CARDIA)     Difficulty of Paying Living Expenses: Not very hard   Food Insecurity: No Food Insecurity (6/12/2025)    Received from [facility]    Hunger Vital Sign     Worried About Running Out of Food in the Last Year: Never true     Ran Out of Food in the Last Year: Never true   Transportation Needs: No Transportation Needs (6/12/2025)    Received from [facility] and [facility]    PRAPARE - Transportation     In the past 12 months, has lack of transportation kept you from medical  "appointments or from getting medications?: No     In the past 12 months, has lack of transportation kept you from meetings, work, or from getting things needed for daily living?: No   Physical Activity: Insufficiently Active (9/20/2022)    Received from [facility]    Exercise Vital Sign     Days of Exercise per Week: 4 days     Minutes of Exercise per Session: 20 min   Stress: Stress Concern Present (8/25/2021)    Received from [facility]    [employer] [place] [employer] - Occupational Stress Questionnaire     Feeling of Stress : Rather much   Social Connections: Moderately Isolated (8/25/2021)    Received from [facility]    Social Connection and Isolation Panel [NHANES]     Frequency of Communication with Friends and Family: Twice a week     Frequency of Social Gatherings with Friends and Family: Never     Attends Voodoo Services: Never     Active Member of Clubs or Organizations: Yes     Attends Club or Organization Meetings: Never     Marital Status: Living with partner   Interpersonal Safety: Not on file   Housing Stability: Low Risk  (6/12/2025)    Received from [facility]    Housing Stability Vital Sign     Unable to Pay for Housing in the Last Year: No     Number of Times Moved in the Last Year: 0     Homeless in the Last Year: No         BP (!) 151/87   Pulse 57   Resp 20   Ht 1.626 m (5' 4\")   Wt 73.9 kg (163 lb)   SpO2 95%   BMI 27.98 kg/m    Body mass index is 27.98 kg/m .  Exam:   GENERAL APPEARANCE: Well developed, well nourished, alert, and in no apparent distress.  EYES: PERRL, EOMI  HENT: Nasal mucosa with no edema and no hyperemia. No nasal polyps.  EARS: Canals clear, TMs normal  MOUTH: Oral mucosa is moist, without any lesions, no tonsillar enlargement, no oropharyngeal exudate.  NECK: supple, no masses, no thyromegaly.  LYMPHATICS: No significant axillary, cervical, or supraclavicular nodes.  RESP: " normal percussion, mildly diminished airflow throughout.  No crackles. No rhonchi. No wheezes.  CV: Normal S1, S2, regular rhythm, normal rate. No murmur.  No rub. No gallop. No LE edema.   ABDOMEN:  Bowel sounds normal, soft, nontender, no HSM or masses.   MS: extremities normal. No clubbing. No cyanosis.  SKIN: no rash on limited exam  NEURO: Mentation intact, speech normal, normal strength and tone, normal gait and stance  PSYCH: mentation appears normal. and affect normal/bright  Results:  Recent Results (from the past week)   INR (External Result)    Collection Time: 06/16/25 12:00 AM   Result Value Ref Range    INR (External) 2.1    6 minute walk test    Collection Time: 06/17/25 12:00 AM   Result Value Ref Range    6 min walk (FT) 1,110 1,294 ft    6 Min Walk (M) 338 394 m   Comprehensive metabolic panel    Collection Time: 06/17/25 10:44 AM   Result Value Ref Range    Sodium 142 135 - 145 mmol/L    Potassium 4.1 3.4 - 5.3 mmol/L    Carbon Dioxide (CO2) 27 22 - 29 mmol/L    Anion Gap 11 7 - 15 mmol/L    Urea Nitrogen 16.2 8.0 - 23.0 mg/dL    Creatinine 0.99 0.67 - 1.17 mg/dL    GFR Estimate 87 >60 mL/min/1.73m2    Calcium 9.8 8.8 - 10.4 mg/dL    Chloride 104 98 - 107 mmol/L    Glucose 79 70 - 99 mg/dL    Alkaline Phosphatase 60 40 - 150 U/L    AST 32 0 - 45 U/L    ALT 34 0 - 70 U/L    Protein Total 7.2 6.4 - 8.3 g/dL    Albumin 4.6 3.5 - 5.2 g/dL    Bilirubin Total 0.4 <=1.2 mg/dL   Magnesium    Collection Time: 06/17/25 10:44 AM   Result Value Ref Range    Magnesium 1.5 (L) 1.7 - 2.3 mg/dL   Phosphorus    Collection Time: 06/17/25 10:44 AM   Result Value Ref Range    Phosphorus 2.4 (L) 2.5 - 4.5 mg/dL   TSH with free T4 reflex    Collection Time: 06/17/25 10:44 AM   Result Value Ref Range    TSH 6.22 (H) 0.30 - 4.20 uIU/mL   Vitamin D Deficiency (D3 Only)    Collection Time: 06/17/25 10:44 AM   Result Value Ref Range    Vitamin D, Total (25-Hydroxy) 39 20 - 50 ng/mL   Calcium    Collection Time: 06/17/25  10:44 AM   Result Value Ref Range    Calcium 9.8 8.8 - 10.4 mg/dL   Tacrolimus by Tandem Mass Spectrometry    Collection Time: 06/17/25 10:44 AM   Result Value Ref Range    Tacrolimus by Tandem Mass Spectrometry 8.0 5.0 - 15.0 ug/L    Tacrolimus Last Dose Date 6/16/2025     Tacrolimus Last Dose Time  8:00 PM    Cytomegalovirus DNA by PCR, Quantitative    Collection Time: 06/17/25 10:44 AM    Specimen: Arm, Right; Blood   Result Value Ref Range    CMV DNA IU/mL Not Detected Not Detected IU/mL    CMV Quantitative PCR Specimen Type Blood    Juarez Barr Virus Quantitative PCR, Plasma    Collection Time: 06/17/25 10:44 AM    Specimen: Arm, Right; Blood   Result Value Ref Range    EBV DNA IU/mL Not Detected Not Detected IU/mL   CBC with platelets and differential    Collection Time: 06/17/25 10:44 AM   Result Value Ref Range    WBC Count 9.4 4.0 - 11.0 10e3/uL    RBC Count 4.74 4.40 - 5.90 10e6/uL    Hemoglobin 14.7 13.3 - 17.7 g/dL    Hematocrit 42.7 40.0 - 53.0 %    MCV 90 78 - 100 fL    MCH 31.0 26.5 - 33.0 pg    MCHC 34.4 31.5 - 36.5 g/dL    RDW 13.0 10.0 - 15.0 %    Platelet Count 177 150 - 450 10e3/uL    % Neutrophils 70 %    % Lymphocytes 18 %    % Monocytes 9 %    % Eosinophils 1 %    % Basophils 1 %    % Immature Granulocytes 2 %    NRBCs per 100 WBC 0 <1 /100    Absolute Neutrophils 6.6 1.6 - 8.3 10e3/uL    Absolute Lymphocytes 1.7 0.8 - 5.3 10e3/uL    Absolute Monocytes 0.8 0.0 - 1.3 10e3/uL    Absolute Eosinophils 0.1 0.0 - 0.7 10e3/uL    Absolute Basophils 0.1 0.0 - 0.2 10e3/uL    Absolute Immature Granulocytes 0.1 <=0.4 10e3/uL    Absolute NRBCs 0.0 10e3/uL   Bilirubin direct    Collection Time: 06/17/25 10:44 AM   Result Value Ref Range    Bilirubin Direct 0.16 0.00 - 0.45 mg/dL   T4 free    Collection Time: 06/17/25 10:44 AM   Result Value Ref Range    Free T4 1.36 0.90 - 1.70 ng/dL   General PFT Lab (Please always keep checked)    Collection Time: 06/17/25 10:56 AM   Result Value Ref Range    FVC-Pred  3.44 L    FVC-Pre 2.05 L    FVC-%Pred-Pre 59 %    FEV1-Pre 1.27 L    FEV1-%Pred-Pre 46 %    FEV1FVC-Pred 79 %    FEV1FVC-Pre 62 %    FEFMax-Pred 7.92 L/sec    FEFMax-Pre 3.88 L/sec    FEFMax-%Pred-Pre 49 %    FEF2575-Pred 2.43 L/sec    FEF2575-Pre 0.61 L/sec    JLW4251-%Pred-Pre 25 %    ExpTime-Pre 7.09 sec    FIFMax-Pre 4.36 L/sec    FEV1FEV6-Pred 79 %    FEV1FEV6-Pre 63 %    VC-Pred 3.84 L    VC-Pre 2.02 L    VC-%Pred-Pre 52 %    IC-Pred 2.51 L    IC-Pre 1.80 L    IC-%Pred-Pre 71 %    ERV-Pred 1.25 L    ERV-Pre 0.22 L    ERV-%Pred-Pre 17 %    FRCPleth-Pred 2.92 L    FRCPleth-Pre 2.47 L    FRCPleth-%Pred-Pre 84 %    RVPleth-Pred 2.22 L    RVPleth-Pre 2.25 L    RVPleth-%Pred-Pre 101 %    TLCPleth-Pred 5.91 L    TLCPleth-Pre 4.27 L    TLCPleth-%Pred-Pre 72 %    DLCOunc-Pred 23.02 ml/min/mmHg    DLCOunc-Pre 14.41 ml/min/mmHg    DLCOunc-%Pred-Pre 62 %    DLCOcor-Pre 14.37 ml/min/mmHg    DLCOcor-%Pred-Pre 62 %    VA-Pre 3.15 L    VA-%Pred-Pre 59 %    FEV1SVC-Pred 71 %    FEV1SVC-Pre 63 %                         Results as noted above.    PFT Interpretation:  Moderate obstructive ventilatory defect , possible restrictive component with reduced total lung capacity.  Unchanged from previous.   Below recent best.   Valid Maneuver    Moderately reduced total lung capacity, decreased from previous.  Normal residual volume, not significantly changed from previous.  Moderate reduced corrected diffusing capacity, decreased from previous.    6-minute walk test: Patient walked 1110 feet (lower limit of normal 1294 feet) oxygen saturation decreased from 95% to 91%.  Walk distance is decreased from February.  No significant change in oxygenation compared to February 2025.

## 2025-06-23 LAB — PROSPERA TRANSPLANT MONITORING: 0.18 %

## 2025-06-24 ENCOUNTER — TELEPHONE (OUTPATIENT)
Dept: TRANSPLANT | Facility: CLINIC | Age: 60
End: 2025-06-24
Payer: COMMERCIAL

## 2025-06-24 DIAGNOSIS — Z94.2 S/P LUNG TRANSPLANT (H): ICD-10-CM

## 2025-06-24 NOTE — TELEPHONE ENCOUNTER
Spoke with Rona from [facility].  Plan for pt to recheck IgG level this week.  Will determine if pt requires further IVIG or not based on result.  Plan to update Rona once IgG level comes back.

## 2025-06-25 ENCOUNTER — TELEPHONE (OUTPATIENT)
Dept: TRANSPLANT | Facility: CLINIC | Age: 60
End: 2025-06-25
Payer: COMMERCIAL

## 2025-06-25 ENCOUNTER — TELEPHONE (OUTPATIENT)
Dept: ANTICOAGULATION | Facility: CLINIC | Age: 60
End: 2025-06-25
Payer: COMMERCIAL

## 2025-06-25 ENCOUNTER — LAB (OUTPATIENT)
Dept: LAB | Facility: CLINIC | Age: 60
End: 2025-06-25
Payer: COMMERCIAL

## 2025-06-25 DIAGNOSIS — Z94.2 S/P LUNG TRANSPLANT (H): ICD-10-CM

## 2025-06-25 DIAGNOSIS — Z94.2 LUNG REPLACED BY TRANSPLANT (H): ICD-10-CM

## 2025-06-25 PROCEDURE — 80197 ASSAY OF TACROLIMUS: CPT

## 2025-06-25 NOTE — TELEPHONE ENCOUNTER
Called pt to check in and see how he is feeling. Also to update him that IgG was 665 and we will not replace at this time. Left message.

## 2025-06-25 NOTE — TELEPHONE ENCOUNTER
Talked to [facility]- will not replace IVIG at this time. IgG was 665. Reviewed with Dr. Woods.

## 2025-06-25 NOTE — TELEPHONE ENCOUNTER
"ANTICOAGULATION     Edson KEVEN Shieldsbrandon  is overdue for an INR check.     Care Everywhere updated: yes    Left message reminding patient to schedule at local/outside lab as soon as possible. If recently tested, but have not yet heard from us, please give us a call at [phone] so we can work with you and your local lab to obtain the results.\"    Labs were drawn today but an INR was not drawn.     Terrie Cedillo RN  6/25/2025  Anticoagulation Clinic  [facility] for routing messages: andrew ROSLAES [facility] patient phone line: [phone]   "

## 2025-06-26 ENCOUNTER — RESULTS FOLLOW-UP (OUTPATIENT)
Dept: TRANSPLANT | Facility: CLINIC | Age: 60
End: 2025-06-26

## 2025-06-26 ENCOUNTER — TELEPHONE (OUTPATIENT)
Dept: TRANSPLANT | Facility: CLINIC | Age: 60
End: 2025-06-26
Payer: COMMERCIAL

## 2025-06-26 DIAGNOSIS — Z94.2 S/P LUNG TRANSPLANT (H): ICD-10-CM

## 2025-06-26 NOTE — LETTER
PHYSICIAN ORDERS    DATE & TIME ISSUED: 2025 1:28 PM  PATIENT NAME: Edson Thornton Jr.   : 1965     [facility] MR# [if applicable]: [id number]     DIAGNOSIS:  Lung Transplant  Z94.2    Chest CT non contrast with inspiratory and expiratory views   Please contact patient to schedule this [phone]    Any questions please call: Alfred/ Gisel [phone]    Please fax these results to [phone].     Requested imaging may be electronically sent to the [place] imaging system via [id number] DICOM connection. When unable to send imaging electronically, an exported DICOM CD may be sent. Please indicate when images have been sent electronically on a return faxed cover sheet. Please send these images the same day as resulted/available        Otherwise, they can mail a CD directly to us at:     [employer] [facility]  IMAG[facility] SERVICES  [address] [place] [address]        .  Abiodun Woods MD  Division of Pulmonary, Allergy, Critical Care and Sleep Medicine  Professor of Medicine

## 2025-06-26 NOTE — TELEPHONE ENCOUNTER
6/19 6MWT and full FPTs reviewed By Dr. Woods.     Requested repeat CT chest non-contrast locally with inspiratory and expiratory views.     Orders sent to PCP. Patient verbalized understanding and agreement of plan.   Requested call/message when CT is done.

## 2025-06-30 ENCOUNTER — TELEPHONE (OUTPATIENT)
Dept: TRANSPLANT | Facility: CLINIC | Age: 60
End: 2025-06-30
Payer: COMMERCIAL

## 2025-06-30 DIAGNOSIS — Z94.2 S/P LUNG TRANSPLANT (H): ICD-10-CM

## 2025-06-30 LAB
TACROLIMUS BLD-MCNC: 8 UG/L (ref 5–15)
TME LAST DOSE: NORMAL H
TME LAST DOSE: NORMAL H

## 2025-06-30 NOTE — TELEPHONE ENCOUNTER
Left message with pt's PCP's clinic to return call to tx office to discuss chest CT order.  Pt needs chest CT w/ inspiratory and expiratory views.  Need to determine if PA is needed or not.

## 2025-07-02 ENCOUNTER — TELEPHONE (OUTPATIENT)
Dept: ANTICOAGULATION | Facility: CLINIC | Age: 60
End: 2025-07-02
Payer: COMMERCIAL

## 2025-07-02 NOTE — TELEPHONE ENCOUNTER
"ANTICOAGULATION     Edson Thornton Jr. is overdue for an INR check.     Care Everywhere updated: yes    Left message reminding patient to schedule at local/outside lab as soon as possible. If recently tested, but have not yet heard from us, please give us a call at [phone] so we can work with you and your local lab to obtain the results.\"    Terrie Cedillo RN  7/2/2025  Anticoagulation Clinic  [facility] for routing messages: andrew ROSALES [facility] patient phone line: [phone]   "

## 2025-07-03 ENCOUNTER — TRANSFERRED RECORDS (OUTPATIENT)
Dept: HEALTH INFORMATION MANAGEMENT | Facility: CLINIC | Age: 60
End: 2025-07-03
Payer: COMMERCIAL

## 2025-07-09 ENCOUNTER — TELEPHONE (OUTPATIENT)
Dept: ANTICOAGULATION | Facility: CLINIC | Age: 60
End: 2025-07-09
Payer: COMMERCIAL

## 2025-07-09 DIAGNOSIS — I63.9 ISCHEMIC STROKE (H): ICD-10-CM

## 2025-07-09 DIAGNOSIS — Z79.899 ENCOUNTER FOR LONG-TERM (CURRENT) USE OF HIGH-RISK MEDICATION: ICD-10-CM

## 2025-07-09 DIAGNOSIS — I48.91 ATRIAL FIBRILLATION, UNSPECIFIED TYPE (H): Primary | ICD-10-CM

## 2025-07-09 DIAGNOSIS — D84.9 IMMUNOSUPPRESSED STATUS: ICD-10-CM

## 2025-07-09 DIAGNOSIS — Z94.2 S/P LUNG TRANSPLANT (H): Chronic | ICD-10-CM

## 2025-07-09 NOTE — TELEPHONE ENCOUNTER
MATHEUS-PROCEDURAL ANTICOAGULATION  MANAGEMENT    Transplant requesting pre-procedure hold orders for warfarin and review for bridging      Procedure date: 7/17/25       Procedure: Bronchoscopy with biopsy      Procedure location and phone number (if external): [place]     Number of warfarin hold days requested and/or target INR: 5 days    Pre-op date: Not applicable      Routing to Anticoagulation Pharmacist for review.     ACC pool: p [facility]     Terrie Cedillo RN

## 2025-07-09 NOTE — TELEPHONE ENCOUNTER
NISHA-PROCEDURAL ANTICOAGULATION  MANAGEMENT    ASSESSMENT     Warfarin interruption plan for Bronchoscopy with biopsy on 7/17/25.    Indication for Anticoagulation: Atrial Fibrillation    [id number] = 5 (Hypertension, Diabetes and Embolic Stroke-10/2021, vascular-DVT)    DVT right subclavian while hospitalized (11/4/21)    Past procedure management  6/2022-Bronchoscopy- bridged with enoxaparin  80 mg Q 12 hrs (1 mg/kg Q 12 hrs)  post procedure bronchial hemorrhage prior to resuming enoxaparin   9/2022-Bronch-bridged with enoxaparin 0.75 mg/kg Q 12 hrs for CrCl 30-60 ml/min (50 mg Q 12 hrs; weight 74.8 kg, creatinine 1.24, CrCl 60 ml/min)  Supratherapeutic Anti-Xa level 9/12/22 enoxaparin reduced 20% to 40 mg Q 12 hrs; repeat anti-Xa 9/16/22-0.69    Nisha-Procedure Risk stratification for thromboembolism: moderate (2022 Chest guidelines)    AFIB: 2022 CHEST Perioperative Management guidelines recommends against bridging for patients with atrial fibrillation except in high risk stratification patients  (([id number] score >= 7 or stroke within 3 months)     NVAF: 2017 ACC periprocedure pathway for NVAF advises for moderate risk with hx of stroke, TIA or systemic embolism > 3 months ago  NO bridge for moderate risk stratification with increased risk of bleeding  Likely bridge for moderate risk stratification with a hx of stroke, TIA or systemic embolism [not at increased bleeding risk]      PLAN     Pre-Procedure:  Hold warfarin for 5 days, until after procedure starting: Sat 7/12   No Bridge advised by Dr. Mckeon after review of updated 2022 chest-nisha procedure guidelines, time since stroke and past bleeding issues with enoxaparin    Post-Procedure:  Resume warfarin dose if okay with provider doing procedure on night of procedure, 7/17/25 PM: 6 mg daily x 2 then resume current dose  Recheck INR ~ 5-7 days after resuming warfarin   ?   Antonella Ren, [facility]    ____  Mady Marin, RN to Me  Abiodun Woods MD   Pan Mckeon, Moira Sebastian, RN  (Selected Message)      7/10/25  9:12 AM  Quinten Ship[place] Dr. Dunitz [place] so I [ask] Dr. Mckeon.     He agreed that we don't need to bridge to enoxaparin.      Let us know if there are any other questions!     Thanks,  Mady  Me to Abiodun Woods MD Porter, Rachel, RN  Mady Marin RN Draz, Kristen, [facility]      7/9/25  4:46 PM  Dr. Woods-     Anticoagulation clinic working with Bret for hold plan for Bronch. On Anticoagulation for Afib hx of stroke 10/2021.  Hx of bridging for 6161-5380 bronch's due to recent nature of stroke and 2017 ACC/AHA guidelines favoring bridging with Afib and stroke hx if not at high bleeding risk.      [place] with post bronch hemorrhage in 6/2022 on larger enoxaparin dose. Did bridge again for 9/2022 bronch with enoxaparin adjusted based on renal function and enoxaparin Anti-Xa level     Now that stroke is more remote, And 2022 ACC Chest [place]-procedure guidelines favors bridging in Afib only for Chadsvasc >=7 or stroke within 3 months--do you want to revisit if we bridge [place]?  Hold without bridge ? Or hold with adjusted dose enoxaparin?      Thank you,  Antonella Ren, [facility]  Anticoagulation Clinic     SUBJECTIVE/OBJECTIVE     Edson Thornton Jr., a 60 year old male    Goal Range: 2.0-3.0     Lab Results   Component Value Date    INR 2.1 06/16/2025    INR 2.6 06/15/2025    INR 2.8 06/14/2025     Lab Results   Component Value Date    HGB 14.7 06/17/2025     06/17/2025     Lab Results   Component Value Date    CR 0.99 06/17/2025    CR 1.10 02/18/2025    CR 1.20 (H) 10/15/2024     Estimated Creatinine Clearance: 73.1 mL/min (based on SCr of 0.99 mg/dL).

## 2025-07-10 NOTE — TELEPHONE ENCOUNTER
Spoke with Bret and informed him of warfarin hold without Lovenox.  Patient is fine with the plan.  My chart message is sent with specifics.

## 2025-07-17 ENCOUNTER — ANTICOAGULATION THERAPY VISIT (OUTPATIENT)
Dept: ANTICOAGULATION | Facility: CLINIC | Age: 60
End: 2025-07-17

## 2025-07-17 ENCOUNTER — APPOINTMENT (OUTPATIENT)
Dept: GENERAL RADIOLOGY | Facility: CLINIC | Age: 60
End: 2025-07-17
Attending: INTERNAL MEDICINE
Payer: COMMERCIAL

## 2025-07-17 ENCOUNTER — HOSPITAL ENCOUNTER (OUTPATIENT)
Facility: CLINIC | Age: 60
Discharge: HOME | End: 2025-07-17
Attending: INTERNAL MEDICINE | Admitting: INTERNAL MEDICINE
Payer: COMMERCIAL

## 2025-07-17 VITALS
HEART RATE: 57 BPM | OXYGEN SATURATION: 90 % | SYSTOLIC BLOOD PRESSURE: 151 MMHG | DIASTOLIC BLOOD PRESSURE: 85 MMHG | RESPIRATION RATE: 17 BRPM

## 2025-07-17 DIAGNOSIS — I63.9 ISCHEMIC STROKE (H): ICD-10-CM

## 2025-07-17 DIAGNOSIS — Z79.899 ENCOUNTER FOR LONG-TERM (CURRENT) USE OF HIGH-RISK MEDICATION: ICD-10-CM

## 2025-07-17 DIAGNOSIS — Z94.2 S/P LUNG TRANSPLANT (H): Chronic | ICD-10-CM

## 2025-07-17 DIAGNOSIS — I48.91 ATRIAL FIBRILLATION, UNSPECIFIED TYPE (H): Primary | ICD-10-CM

## 2025-07-17 DIAGNOSIS — I48.91 ATRIAL FIBRILLATION, UNSPECIFIED TYPE (H): ICD-10-CM

## 2025-07-17 DIAGNOSIS — D84.9 IMMUNOSUPPRESSED STATUS: ICD-10-CM

## 2025-07-17 DIAGNOSIS — Z94.2 S/P LUNG TRANSPLANT (H): ICD-10-CM

## 2025-07-17 DIAGNOSIS — Z94.2 LUNG REPLACED BY TRANSPLANT (H): ICD-10-CM

## 2025-07-17 LAB
ALBUMIN SERPL BCG-MCNC: 4.1 G/DL (ref 3.5–5.2)
ALP SERPL-CCNC: 44 U/L (ref 40–150)
ALT SERPL W P-5'-P-CCNC: 24 U/L (ref 0–70)
ANION GAP SERPL CALCULATED.3IONS-SCNC: 10 MMOL/L (ref 7–15)
APPEARANCE FLD: ABNORMAL
AST SERPL W P-5'-P-CCNC: 25 U/L (ref 0–45)
BACTERIA SPEC CULT: NORMAL
BASOPHILS # BLD AUTO: 0.1 10E3/UL (ref 0–0.2)
BASOPHILS NFR BLD AUTO: 1 %
BILIRUB SERPL-MCNC: 0.4 MG/DL
BUN SERPL-MCNC: 14 MG/DL (ref 8–23)
CALCIUM SERPL-MCNC: 9.1 MG/DL (ref 8.8–10.4)
CHLORIDE SERPL-SCNC: 106 MMOL/L (ref 98–107)
CMV DNA SPEC NAA+PROBE-ACNC: NOT DETECTED IU/ML
COLOR FLD: COLORLESS
CREAT SERPL-MCNC: 0.86 MG/DL (ref 0.67–1.17)
EGFRCR SERPLBLD CKD-EPI 2021: >90 ML/MIN/1.73M2
EOSINOPHIL # BLD AUTO: 0.1 10E3/UL (ref 0–0.7)
EOSINOPHIL NFR BLD AUTO: 1 %
ERYTHROCYTE [DISTWIDTH] IN BLOOD BY AUTOMATED COUNT: 13.6 % (ref 10–15)
GLUCOSE BLDC GLUCOMTR-MCNC: 92 MG/DL (ref 70–99)
GLUCOSE SERPL-MCNC: 86 MG/DL (ref 70–99)
GRAM STAIN RESULT: NORMAL
GRAM STAIN RESULT: NORMAL
HCO3 SERPL-SCNC: 25 MMOL/L (ref 22–29)
HCT VFR BLD AUTO: 38.2 % (ref 40–53)
HGB BLD-MCNC: 12.9 G/DL (ref 13.3–17.7)
IMM GRANULOCYTES # BLD: 0.2 10E3/UL
IMM GRANULOCYTES NFR BLD: 2 %
INR PPP: 1.16 (ref 0.85–1.15)
LYMPHOCYTES # BLD AUTO: 1.7 10E3/UL (ref 0.8–5.3)
LYMPHOCYTES NFR BLD AUTO: 19 %
LYMPHOCYTES NFR FLD MANUAL: 2 %
MAGNESIUM SERPL-MCNC: 1.4 MG/DL (ref 1.7–2.3)
MCH RBC QN AUTO: 30.6 PG (ref 26.5–33)
MCHC RBC AUTO-ENTMCNC: 33.8 G/DL (ref 31.5–36.5)
MCV RBC AUTO: 91 FL (ref 78–100)
MONOCYTES # BLD AUTO: 0.9 10E3/UL (ref 0–1.3)
MONOCYTES NFR BLD AUTO: 10 %
MONOS+MACROS NFR FLD MANUAL: 88 %
NEUTROPHILS # BLD AUTO: 6.2 10E3/UL (ref 1.6–8.3)
NEUTROPHILS NFR BLD AUTO: 68 %
NEUTS BAND NFR FLD MANUAL: 10 %
NRBC # BLD AUTO: 0 10E3/UL
NRBC BLD AUTO-RTO: 0 /100
PATH REPORT.COMMENTS IMP SPEC: NORMAL
PATH REPORT.FINAL DX SPEC: NORMAL
PATH REPORT.GROSS SPEC: NORMAL
PATH REPORT.MICROSCOPIC SPEC OTHER STN: NORMAL
PATH REPORT.RELEVANT HX SPEC: NORMAL
PHOSPHATE SERPL-MCNC: 2.2 MG/DL (ref 2.5–4.5)
PLATELET # BLD AUTO: 157 10E3/UL (ref 150–450)
POTASSIUM SERPL-SCNC: 3.8 MMOL/L (ref 3.4–5.3)
PROT SERPL-MCNC: 5.9 G/DL (ref 6.4–8.3)
PROTHROMBIN TIME: 14.8 SECONDS (ref 11.8–14.8)
RBC # BLD AUTO: 4.22 10E6/UL (ref 4.4–5.9)
SODIUM SERPL-SCNC: 141 MMOL/L (ref 135–145)
SPECIMEN SOURCE FLD: ABNORMAL
SPECIMEN TYPE: NORMAL
TACROLIMUS BLD-MCNC: 6.3 UG/L (ref 5–15)
TME LAST DOSE: NORMAL H
TME LAST DOSE: NORMAL H
WBC # BLD AUTO: 9.1 10E3/UL (ref 4–11)
WBC # FLD AUTO: 105 /UL

## 2025-07-17 PROCEDURE — 89050 BODY FLUID CELL COUNT: CPT | Performed by: INTERNAL MEDICINE

## 2025-07-17 PROCEDURE — 31628 BRONCHOSCOPY/LUNG BX EACH: CPT | Mod: GC | Performed by: INTERNAL MEDICINE

## 2025-07-17 PROCEDURE — 999N000065 XR CHEST PORT 1 VIEW

## 2025-07-17 PROCEDURE — 87305 ASPERGILLUS AG IA: CPT | Performed by: INTERNAL MEDICINE

## 2025-07-17 PROCEDURE — 82962 GLUCOSE BLOOD TEST: CPT

## 2025-07-17 PROCEDURE — 83735 ASSAY OF MAGNESIUM: CPT | Performed by: INTERNAL MEDICINE

## 2025-07-17 PROCEDURE — 87102 FUNGUS ISOLATION CULTURE: CPT | Performed by: INTERNAL MEDICINE

## 2025-07-17 PROCEDURE — G0500 MOD SEDAT ENDO SERVICE >5YRS: HCPCS | Performed by: INTERNAL MEDICINE

## 2025-07-17 PROCEDURE — 80053 COMPREHEN METABOLIC PANEL: CPT | Performed by: INTERNAL MEDICINE

## 2025-07-17 PROCEDURE — 88312 SPECIAL STAINS GROUP 1: CPT | Mod: TC | Performed by: INTERNAL MEDICINE

## 2025-07-17 PROCEDURE — 87206 SMEAR FLUORESCENT/ACID STAI: CPT | Performed by: INTERNAL MEDICINE

## 2025-07-17 PROCEDURE — 31624 DX BRONCHOSCOPE/LAVAGE: CPT | Mod: GC | Performed by: INTERNAL MEDICINE

## 2025-07-17 PROCEDURE — 31628 BRONCHOSCOPY/LUNG BX EACH: CPT

## 2025-07-17 PROCEDURE — 31624 DX BRONCHOSCOPE/LAVAGE: CPT | Performed by: INTERNAL MEDICINE

## 2025-07-17 PROCEDURE — 99153 MOD SED SAME PHYS/QHP EA: CPT | Performed by: INTERNAL MEDICINE

## 2025-07-17 PROCEDURE — 85004 AUTOMATED DIFF WBC COUNT: CPT | Performed by: INTERNAL MEDICINE

## 2025-07-17 PROCEDURE — 87205 SMEAR GRAM STAIN: CPT | Performed by: INTERNAL MEDICINE

## 2025-07-17 PROCEDURE — 85610 PROTHROMBIN TIME: CPT | Performed by: INTERNAL MEDICINE

## 2025-07-17 PROCEDURE — 36415 COLL VENOUS BLD VENIPUNCTURE: CPT | Performed by: INTERNAL MEDICINE

## 2025-07-17 PROCEDURE — 80197 ASSAY OF TACROLIMUS: CPT | Performed by: INTERNAL MEDICINE

## 2025-07-17 PROCEDURE — 250N000009 HC RX 250: Performed by: INTERNAL MEDICINE

## 2025-07-17 PROCEDURE — 88305 TISSUE EXAM BY PATHOLOGIST: CPT | Mod: TC | Performed by: INTERNAL MEDICINE

## 2025-07-17 PROCEDURE — 71045 X-RAY EXAM CHEST 1 VIEW: CPT | Mod: 26 | Performed by: STUDENT IN AN ORGANIZED HEALTH CARE EDUCATION/TRAINING PROGRAM

## 2025-07-17 PROCEDURE — 84100 ASSAY OF PHOSPHORUS: CPT | Performed by: INTERNAL MEDICINE

## 2025-07-17 PROCEDURE — 88108 CYTOPATH CONCENTRATE TECH: CPT | Mod: 26 | Performed by: PATHOLOGY

## 2025-07-17 PROCEDURE — 87385 HISTOPLASMA CAPSUL AG IA: CPT | Performed by: INTERNAL MEDICINE

## 2025-07-17 PROCEDURE — 250N000011 HC RX IP 250 OP 636: Performed by: INTERNAL MEDICINE

## 2025-07-17 PROCEDURE — 88312 SPECIAL STAINS GROUP 1: CPT | Mod: 26 | Performed by: PATHOLOGY

## 2025-07-17 PROCEDURE — 999N000180 XR SURGERY CARM FLUORO LESS THAN 5 MIN

## 2025-07-17 PROCEDURE — 87070 CULTURE OTHR SPECIMN AEROBIC: CPT | Performed by: INTERNAL MEDICINE

## 2025-07-17 RX ORDER — LIDOCAINE HYDROCHLORIDE 10 MG/ML
INJECTION, SOLUTION INFILTRATION; PERINEURAL PRN
Status: DISCONTINUED | OUTPATIENT
Start: 2025-07-17 | End: 2025-07-17 | Stop reason: HOSPADM

## 2025-07-17 RX ORDER — FENTANYL CITRATE 50 UG/ML
INJECTION, SOLUTION INTRAMUSCULAR; INTRAVENOUS PRN
Status: DISCONTINUED | OUTPATIENT
Start: 2025-07-17 | End: 2025-07-17 | Stop reason: HOSPADM

## 2025-07-17 RX ORDER — LIDOCAINE HYDROCHLORIDE AND EPINEPHRINE 10; 10 MG/ML; UG/ML
INJECTION, SOLUTION INFILTRATION; PERINEURAL PRN
Status: DISCONTINUED | OUTPATIENT
Start: 2025-07-17 | End: 2025-07-17 | Stop reason: HOSPADM

## 2025-07-17 RX ORDER — LIDOCAINE HYDROCHLORIDE 40 MG/ML
INJECTION, SOLUTION RETROBULBAR PRN
Status: DISCONTINUED | OUTPATIENT
Start: 2025-07-17 | End: 2025-07-17 | Stop reason: HOSPADM

## 2025-07-17 RX ORDER — MAGNESIUM HYDROXIDE 1200 MG/15ML
LIQUID ORAL PRN
Status: DISCONTINUED | OUTPATIENT
Start: 2025-07-17 | End: 2025-07-17 | Stop reason: HOSPADM

## 2025-07-17 ASSESSMENT — ACTIVITIES OF DAILY LIVING (ADL)
ADLS_ACUITY_SCORE: 56

## 2025-07-17 NOTE — PROGRESS NOTES
ANTICOAGULATION MANAGEMENT     Edson Thornton Jr. 60 year old male is on warfarin with subtherapeutic INR result. (Goal INR 2.0-3.0)    Recent labs: (last 7 days)     07/17/25  1007   INR 1.16*       ASSESSMENT     Source(s): Chart Review  Previous INR was Therapeutic last 2(+) visits  Medication, diet, health changes since last INR: bronchoscopy on 7/17, 4 day warfarin hold prior.         PLAN     Recommended plan for temporary change(s) affecting INR     Dosing Instructions: Continue with post procedure recommendations of booster dose x2 (7/17, 7/18) with next INR in 1 week       Summary  As of 7/17/2025      Full warfarin instructions:  7/17: 6 mg; 7/18: 6 mg; Otherwise 3 mg every Sat; 4 mg all other days   Next INR check:  7/24/2025               Detailed voice message left for Bret with dosing instructions and follow up date.   Sent Outline message with dosing and follow up instructions    Patient using outside facility for labs    Education provided: Please call back if any changes to your diet, medications or how you've been taking warfarin  Goal range and lab monitoring: goal range and significance of current result  Symptom monitoring: monitoring for bleeding signs and symptoms and monitoring for clotting signs and symptoms  Hold instructions for procedure/surgery reviewed; patient to discuss with the provider doing the procedure if it is okay to restart anticoagulation as planned  Written instructions provided  Contact [phone] with any changes, questions or concerns.     Plan made per [facility] anticoagulation protocol; procedure plan    Candida Gibson RN  7/17/2025  Anticoagulation Clinic  [facility] for routing messages: andrew ROSALES [facility] CL[facility]  [facility] patient phone line: [phone]        _______________________________________________________________________     Anticoagulation Episode Summary       Current INR goal:  2.0-3.0   TTR:  44.8% (1 y)   Target end date:  Indefinite   Send INR reminders to:  [facility]    Indications    Atrial fibrillation  unspecified type (H) [I48.91]  Encounter for long-term (current) use of high-risk medication [Z79.899]  Immunosuppressed status [D84.9]  S/P lung transplant (H) [Z94.2]  Ischemic stroke (H) [I63.9]             Comments:  --             Anticoagulation Care Providers       Provider Role Specialty Phone number    Abiodun Woods MD Referring Pulmonary Disease [phone]

## 2025-07-17 NOTE — DISCHARGE INSTRUCTIONS
Discharge Instructions after Bronchoscopy    Activity  You had medicine to relax and for pain. You may feel dizzy or sleepy.  For 24 hours:   Do not drive or use heavy equipment.   Do not make important decisions.   Do not drink any alcohol.    Diet  When you can swallow easily, you may go back to your regular diet, medicines  and light exercise.    Follow-up  We took small tissue or fluid samples to study. We will call you with the results in about 10 business days.    Call right away if you have:   Unusual chest pain   Temperature above 100.6  F (37.5  C)   Coughing that does not stop.    If you have severe pain, bleeding, or shortness of breath, go to an emergency room.    If you have questions, call:  Monday to Friday, 8 a.m. to 4:30 p.m.  Adult Pulmonology Clinic: [phone]    After hours:  [facility]: [phone] (Ask for the pulmonary fellow on call)

## 2025-07-18 LAB
GALACTOMANNAN AG BAL QL: NEGATIVE
GALACTOMANNAN AG SPEC IA-ACNC: 0.11
PATH REPORT.COMMENTS IMP SPEC: NORMAL
PATH REPORT.COMMENTS IMP SPEC: NORMAL
PATH REPORT.FINAL DX SPEC: NORMAL
PATH REPORT.GROSS SPEC: NORMAL
PATH REPORT.MICROSCOPIC SPEC OTHER STN: NORMAL
PATH REPORT.RELEVANT HX SPEC: NORMAL
PHOTO IMAGE: NORMAL

## 2025-07-19 LAB
ACID FAST STAIN (ARUP): NORMAL
BACTERIA BRONCH: NORMAL

## 2025-07-21 LAB
BACTERIA BRONCH: ABNORMAL
BRONCHOSCOPY: NORMAL
NTRA CURRENT TEST RESULT: NORMAL
NTRA PATIENT TEST HISTORY: NORMAL
SCANNED LAB RESULT: NORMAL

## 2025-07-22 ENCOUNTER — TELEPHONE (OUTPATIENT)
Dept: TRANSPLANT | Facility: CLINIC | Age: 60
End: 2025-07-22
Payer: COMMERCIAL

## 2025-07-22 DIAGNOSIS — Z94.2 S/P LUNG TRANSPLANT (H): ICD-10-CM

## 2025-07-22 NOTE — TELEPHONE ENCOUNTER
"Pt states he was not properly sedated for bronchoscopy on 7/17, noting he was aware of everything happening during the procedure and hearing the staff talking.  He did not notify anyone during or after the procedure.  Plan for pt to have additional sedation if needing bronchoscopy in future.  He states that he \"feels fine, and I thought they just changed the sedation procedure for bronchs or something.\"  "

## 2025-08-12 ENCOUNTER — ANTICOAGULATION THERAPY VISIT (OUTPATIENT)
Dept: ANTICOAGULATION | Facility: CLINIC | Age: 60
End: 2025-08-12
Payer: COMMERCIAL

## 2025-08-12 LAB — INR (EXTERNAL): 2.9 (ref 0.9–1.1)

## 2025-08-14 LAB — BACTERIA BRONCH: ABNORMAL

## 2025-08-28 ENCOUNTER — MYC REFILL (OUTPATIENT)
Dept: TRANSPLANT | Facility: CLINIC | Age: 60
End: 2025-08-28
Payer: COMMERCIAL

## 2025-08-28 DIAGNOSIS — Z94.2 S/P LUNG TRANSPLANT (H): Chronic | ICD-10-CM

## 2025-08-28 DIAGNOSIS — E61.1 IRON DEFICIENCY: ICD-10-CM

## 2025-08-28 RX ORDER — FERROUS SULFATE 325(65) MG
325 TABLET ORAL
Qty: 90 TABLET | Refills: 3 | Status: SHIPPED | OUTPATIENT
Start: 2025-08-28

## (undated) DEVICE — SU ETHIBOND 2-0 SHDA 30" X563H

## (undated) DEVICE — CANISTER WOUND VAC W/GEL 1000ML M8275093/5

## (undated) DEVICE — BUR STRK SIDE CUT 1.6X5.1X44.8MM CARBIDE 6FLUTE 1607-002-107

## (undated) DEVICE — ESU HOLSTER PLASTIC DISP E2400

## (undated) DEVICE — SOL WATER IRRIG 1000ML BOTTLE 2F7114

## (undated) DEVICE — LINEN TOWEL PACK X6 WHITE 5487

## (undated) DEVICE — INTR PEELAWAY 20FRX20CM G06445 PLVW-20.0-38

## (undated) DEVICE — SPONGE RAY-TEC 4X8" 7318

## (undated) DEVICE — LUBRICANT INST KIT ENDO-LUBE 220-90

## (undated) DEVICE — TUBING SUCTION DRAINAGE PLEURAL DUAL 8884714200

## (undated) DEVICE — SU VICRYL 3-0 FS-1 27" J442H

## (undated) DEVICE — SU SILK 0 TIE 6X30" A306H

## (undated) DEVICE — ENDO VALVE BX EVIS MAJ-210

## (undated) DEVICE — SU VICRYL 0 CTX 36" J370H

## (undated) DEVICE — DRAPE SLUSH/WARMER 66X44" ORS-320

## (undated) DEVICE — SU STEEL 6 CCS 4X18" M654G

## (undated) DEVICE — Device

## (undated) DEVICE — SU PROLENE 4-0 SHDA 36" 8521H

## (undated) DEVICE — BLADE CLIPPER SGL USE 9680

## (undated) DEVICE — SU PROLENE 6-0 C-1DA 30" 8706H

## (undated) DEVICE — ESU ELEC BLADE 6" COATED/INSULATED E1455-6

## (undated) DEVICE — SYR EAR BULB 3OZ 0035830

## (undated) DEVICE — SLEEVE TR BAND RADIAL COMPRESSION DEVICE 24CM TRB24-REG

## (undated) DEVICE — DRAIN CHEST TUBE 28FR STR 8028

## (undated) DEVICE — DRAIN CHEST TUBE RIGHT ANGLED 28FR 8128

## (undated) DEVICE — TAPE MEDIPORE 4"X2YD 2864

## (undated) DEVICE — SURGICEL HEMOSTAT 4X8" 1952

## (undated) DEVICE — SU ETHIBOND 3-0 BBDA 36" X588H

## (undated) DEVICE — PACK ADULT HEART UMMC PV15CG92D

## (undated) DEVICE — CLIP HORIZON MED BLUE 002200

## (undated) DEVICE — LINEN TOWEL PACK X5 5464

## (undated) DEVICE — SPONGE KITTNER 30-101

## (undated) DEVICE — SUCTION DRY CHEST DRAIN OASIS 3600-100

## (undated) DEVICE — TUBING PRESSURE 30"

## (undated) DEVICE — CLIP HORIZON LG ORANGE 004200

## (undated) DEVICE — TIES BANDING T50R

## (undated) DEVICE — SPECIMEN TRAP MUCOUS 40ML LUKI C30200A

## (undated) DEVICE — TUBE GASTROSTOMY PONSKY PULL DELUXE 20FR 000792

## (undated) DEVICE — GUIDEWIRE AMPLATZ SUPER STIFF 0.035"X180CM M001465250

## (undated) DEVICE — ENDO VALVE SUCTION BRONCH EVIS MAJ-209

## (undated) DEVICE — SUCTION CATH AIRLIFE TRI-FLO W/CONTROL PORT 14FR  T60C

## (undated) DEVICE — JELLY LUBRICATING SURGILUBE 2OZ TUBE

## (undated) DEVICE — LINEN GOWN XLG 5407

## (undated) DEVICE — PREP CHLORAPREP 26ML TINTED ORANGE  260815

## (undated) DEVICE — DRSG ADAPTIC 3X16"  6114

## (undated) DEVICE — SU VICRYL 2-0 CT-1 27" UND J259H

## (undated) DEVICE — WIPES FOLEY CARE SURESTEP PROVON DFC100

## (undated) DEVICE — PROTECTOR ARM ONE-STEP TRENDELENBURG 40418

## (undated) DEVICE — DEVICE SUTURE GRASPER TROCAR CLOSURE 14GA PMITCSG

## (undated) DEVICE — SU ETHILON 2-0 FS 18" 664H

## (undated) DEVICE — SU PLEDGET SOFT TFE 13MMX7MMX1.5MM D7044

## (undated) DEVICE — SU PROLENE 5-0 RB-2DA 30" 8710H

## (undated) DEVICE — INTRO SHEATH 7FRX10CM PINNACLE RSS702

## (undated) DEVICE — STPL SKIN 35W ROTATING HEAD PRW35

## (undated) DEVICE — BASIN SET SINGLE STERILE 13752-624

## (undated) DEVICE — SU ETHIBOND 5 LRDA 30" B499T

## (undated) DEVICE — SOL NACL 0.9% 10ML VIAL 0409-4888-02

## (undated) DEVICE — DRSG TEGADERM 4X4 3/4" 1626W

## (undated) DEVICE — SUCTION MANIFOLD NEPTUNE 2 SYS 4 PORT 0702-020-000

## (undated) DEVICE — DRSG PRIMAPORE 02X3" 7133

## (undated) DEVICE — DRSG GAUZE 4X4" TRAY 6939

## (undated) DEVICE — ENDO TUBING CO2 SMARTCAP STERILE DISP 100145CO2EXT

## (undated) DEVICE — BAG URINARY DRAIN LUBRISIL IC 4000ML LF 253509A

## (undated) DEVICE — STATLOCK PSI DEVICE

## (undated) DEVICE — VESSEL LOOPS YELLOW MAXI 31145694

## (undated) DEVICE — PREP CHLORHEXIDINE 4% 4OZ (HIBICLENS) 57504

## (undated) DEVICE — SHTH INTRO 0.021IN ID 6FR DIA

## (undated) DEVICE — GLOVE GAMMEX NEOPRENE ULTRA SZ 7.5 LF 8515

## (undated) DEVICE — ENDO ADPT BRONCH SWIVEL Y A1002

## (undated) DEVICE — SU PROLENE 4-0 RB-1DA 36" 8557H

## (undated) DEVICE — ESU LIGASURE IMPACT OPEN SEALER/DVDR CVD LG JAW LF4418

## (undated) DEVICE — SU ETHIBOND 0 CT-1 CR 8X18" CX21D

## (undated) DEVICE — LINEN TOWEL PACK X30 5481

## (undated) DEVICE — SU CHROMIC 3-0 FS-2 27" 636

## (undated) DEVICE — SOL NACL 0.9% IRRIG 1000ML BOTTLE 2F7124

## (undated) DEVICE — STPL POWERED ECHELON VASC 35MM PVE35A

## (undated) DEVICE — TUBE TRACH PERC INTRODUCER CIAGLIA BLUE RHINO 8FR G12115

## (undated) DEVICE — SYR 30ML SLIP TIP W/O NDL 302833

## (undated) DEVICE — PACK HEART LEFT CUSTOM

## (undated) DEVICE — DEFIB PRO-PADZ LVP LQD GEL ADULT 8900-2105-01

## (undated) DEVICE — ESU ELEC BLADE 2.75" COATED/INSULATED E1455

## (undated) DEVICE — GLOVE LINER COTTON MED 32-2076

## (undated) DEVICE — TUBING SUCTION 10'X3/16" N510

## (undated) DEVICE — SU CHROMIC 3-0 SH 27" G122H

## (undated) DEVICE — MANIFOLD KIT ANGIO AUTOMATED 014613

## (undated) DEVICE — TOOTHBRUSH ADULT NON STERILE MDS136850

## (undated) DEVICE — SYR 10ML SLIP TIP W/O NDL 303134

## (undated) DEVICE — KIT ENDO FIRST STEP DISINFECTANT 200ML W/POUCH EP-4

## (undated) DEVICE — TUBE TRANS GASTROJEJUNOSTOMY ENFIT 18FRX45CM 8250-18

## (undated) DEVICE — DRAPE IOBAN INCISE 23X17" 6650EZ

## (undated) DEVICE — SU SILK 0 SH 30" K834H

## (undated) DEVICE — SU PLEDGET SOFT TFE 3/8"X3/26"X1/16" PCP40

## (undated) DEVICE — CONNECTOR BLAKE DRAIN SGL BCC1

## (undated) DEVICE — SU PROLENE 5-0 RB-1DA 36"  8556H

## (undated) DEVICE — KIT HAND CONTROL ACIST 014644 AR-P54

## (undated) DEVICE — PREP POVIDONE IODINE SOLUTION 10% 4OZ

## (undated) RX ORDER — FENTANYL CITRATE-0.9 % NACL/PF 10 MCG/ML
PLASTIC BAG, INJECTION (ML) INTRAVENOUS
Status: DISPENSED
Start: 2021-10-16

## (undated) RX ORDER — LIDOCAINE HYDROCHLORIDE AND EPINEPHRINE 10; 10 MG/ML; UG/ML
INJECTION, SOLUTION INFILTRATION; PERINEURAL
Status: DISPENSED
Start: 2022-06-21

## (undated) RX ORDER — DEXAMETHASONE SODIUM PHOSPHATE 4 MG/ML
INJECTION, SOLUTION INTRA-ARTICULAR; INTRALESIONAL; INTRAMUSCULAR; INTRAVENOUS; SOFT TISSUE
Status: DISPENSED
Start: 2021-09-22

## (undated) RX ORDER — HEPARIN SODIUM 1000 [USP'U]/ML
INJECTION, SOLUTION INTRAVENOUS; SUBCUTANEOUS
Status: DISPENSED
Start: 2021-10-16

## (undated) RX ORDER — LIDOCAINE HYDROCHLORIDE AND EPINEPHRINE 10; 10 MG/ML; UG/ML
INJECTION, SOLUTION INFILTRATION; PERINEURAL
Status: DISPENSED
Start: 2025-07-17

## (undated) RX ORDER — CEFAZOLIN SODIUM 1 G/3ML
INJECTION, POWDER, FOR SOLUTION INTRAMUSCULAR; INTRAVENOUS
Status: DISPENSED
Start: 2021-10-16

## (undated) RX ORDER — FENTANYL CITRATE 50 UG/ML
INJECTION, SOLUTION INTRAMUSCULAR; INTRAVENOUS
Status: DISPENSED
Start: 2021-10-16

## (undated) RX ORDER — ROCURONIUM BROMIDE 50 MG/5 ML
SYRINGE (ML) INTRAVENOUS
Status: DISPENSED
Start: 2021-10-16

## (undated) RX ORDER — LIDOCAINE HYDROCHLORIDE 20 MG/ML
SOLUTION OROPHARYNGEAL
Status: DISPENSED
Start: 2022-01-26

## (undated) RX ORDER — ONDANSETRON 2 MG/ML
INJECTION INTRAMUSCULAR; INTRAVENOUS
Status: DISPENSED
Start: 2021-10-29

## (undated) RX ORDER — LIDOCAINE HYDROCHLORIDE 20 MG/ML
INJECTION, SOLUTION EPIDURAL; INFILTRATION; INTRACAUDAL; PERINEURAL
Status: DISPENSED
Start: 2021-09-22

## (undated) RX ORDER — LIDOCAINE HYDROCHLORIDE 10 MG/ML
INJECTION, SOLUTION EPIDURAL; INFILTRATION; INTRACAUDAL; PERINEURAL
Status: DISPENSED
Start: 2021-09-22

## (undated) RX ORDER — EPHEDRINE SULFATE 50 MG/ML
INJECTION, SOLUTION INTRAMUSCULAR; INTRAVENOUS; SUBCUTANEOUS
Status: DISPENSED
Start: 2021-09-22

## (undated) RX ORDER — PROPOFOL 10 MG/ML
INJECTION, EMULSION INTRAVENOUS
Status: DISPENSED
Start: 2021-10-29

## (undated) RX ORDER — FENTANYL CITRATE 50 UG/ML
INJECTION, SOLUTION INTRAMUSCULAR; INTRAVENOUS
Status: DISPENSED
Start: 2022-06-21

## (undated) RX ORDER — EPINEPHRINE IN SOD CHLOR,ISO 1 MG/10 ML
SYRINGE (ML) INTRAVENOUS
Status: DISPENSED
Start: 2021-10-23

## (undated) RX ORDER — LIDOCAINE HYDROCHLORIDE 20 MG/ML
INJECTION, SOLUTION EPIDURAL; INFILTRATION; INTRACAUDAL; PERINEURAL
Status: DISPENSED
Start: 2021-10-29

## (undated) RX ORDER — LIDOCAINE HYDROCHLORIDE AND EPINEPHRINE 10; 10 MG/ML; UG/ML
INJECTION, SOLUTION INFILTRATION; PERINEURAL
Status: DISPENSED
Start: 2022-04-19

## (undated) RX ORDER — LIDOCAINE HYDROCHLORIDE 20 MG/ML
SOLUTION OROPHARYNGEAL
Status: DISPENSED
Start: 2021-10-20

## (undated) RX ORDER — FENTANYL CITRATE-0.9 % NACL/PF 10 MCG/ML
PLASTIC BAG, INJECTION (ML) INTRAVENOUS
Status: DISPENSED
Start: 2021-09-22

## (undated) RX ORDER — IPRATROPIUM BROMIDE AND ALBUTEROL SULFATE 2.5; .5 MG/3ML; MG/3ML
SOLUTION RESPIRATORY (INHALATION)
Status: DISPENSED
Start: 2022-06-21

## (undated) RX ORDER — IOPAMIDOL 408 MG/ML
INJECTION, SOLUTION INTRATHECAL
Status: DISPENSED
Start: 2021-11-09

## (undated) RX ORDER — LIDOCAINE HYDROCHLORIDE 40 MG/ML
SOLUTION TOPICAL
Status: DISPENSED
Start: 2022-04-19

## (undated) RX ORDER — HEPARIN SODIUM 1000 [USP'U]/ML
INJECTION, SOLUTION INTRAVENOUS; SUBCUTANEOUS
Status: DISPENSED
Start: 2021-09-08

## (undated) RX ORDER — LIDOCAINE HYDROCHLORIDE 10 MG/ML
INJECTION, SOLUTION EPIDURAL; INFILTRATION; INTRACAUDAL; PERINEURAL
Status: DISPENSED
Start: 2022-04-19

## (undated) RX ORDER — LIDOCAINE HYDROCHLORIDE 20 MG/ML
SOLUTION OROPHARYNGEAL
Status: DISPENSED
Start: 2022-04-19

## (undated) RX ORDER — LIDOCAINE HYDROCHLORIDE 40 MG/ML
SOLUTION TOPICAL
Status: DISPENSED
Start: 2022-01-26

## (undated) RX ORDER — LIDOCAINE HYDROCHLORIDE 10 MG/ML
INJECTION, SOLUTION EPIDURAL; INFILTRATION; INTRACAUDAL; PERINEURAL
Status: DISPENSED
Start: 2022-06-21

## (undated) RX ORDER — LIDOCAINE HYDROCHLORIDE 20 MG/ML
SOLUTION OROPHARYNGEAL
Status: DISPENSED
Start: 2021-10-25

## (undated) RX ORDER — DEXAMETHASONE SODIUM PHOSPHATE 4 MG/ML
INJECTION, SOLUTION INTRA-ARTICULAR; INTRALESIONAL; INTRAMUSCULAR; INTRAVENOUS; SOFT TISSUE
Status: DISPENSED
Start: 2021-10-16

## (undated) RX ORDER — LIDOCAINE HYDROCHLORIDE 10 MG/ML
INJECTION, SOLUTION EPIDURAL; INFILTRATION; INTRACAUDAL; PERINEURAL
Status: DISPENSED
Start: 2025-07-17

## (undated) RX ORDER — PROTAMINE SULFATE 10 MG/ML
INJECTION, SOLUTION INTRAVENOUS
Status: DISPENSED
Start: 2021-09-08

## (undated) RX ORDER — PANTOPRAZOLE SODIUM 40 MG/1
TABLET, DELAYED RELEASE ORAL
Status: DISPENSED
Start: 2022-06-21

## (undated) RX ORDER — NITROGLYCERIN 5 MG/ML
VIAL (ML) INTRAVENOUS
Status: DISPENSED
Start: 2021-09-08

## (undated) RX ORDER — FENTANYL CITRATE 50 UG/ML
INJECTION, SOLUTION INTRAMUSCULAR; INTRAVENOUS
Status: DISPENSED
Start: 2021-10-29

## (undated) RX ORDER — FENTANYL CITRATE-0.9 % NACL/PF 10 MCG/ML
PLASTIC BAG, INJECTION (ML) INTRAVENOUS
Status: DISPENSED
Start: 2021-10-29

## (undated) RX ORDER — ONDANSETRON 2 MG/ML
INJECTION INTRAMUSCULAR; INTRAVENOUS
Status: DISPENSED
Start: 2021-09-22

## (undated) RX ORDER — LIDOCAINE HYDROCHLORIDE 20 MG/ML
SOLUTION OROPHARYNGEAL
Status: DISPENSED
Start: 2022-06-21

## (undated) RX ORDER — PROPOFOL 10 MG/ML
INJECTION, EMULSION INTRAVENOUS
Status: DISPENSED
Start: 2021-10-16

## (undated) RX ORDER — EPHEDRINE SULFATE 50 MG/ML
INJECTION, SOLUTION INTRAMUSCULAR; INTRAVENOUS; SUBCUTANEOUS
Status: DISPENSED
Start: 2021-10-29

## (undated) RX ORDER — DEXAMETHASONE SODIUM PHOSPHATE 4 MG/ML
INJECTION, SOLUTION INTRA-ARTICULAR; INTRALESIONAL; INTRAMUSCULAR; INTRAVENOUS; SOFT TISSUE
Status: DISPENSED
Start: 2021-10-29

## (undated) RX ORDER — ALBUTEROL SULFATE 0.83 MG/ML
SOLUTION RESPIRATORY (INHALATION)
Status: DISPENSED
Start: 2022-11-14

## (undated) RX ORDER — LIDOCAINE HYDROCHLORIDE 20 MG/ML
SOLUTION OROPHARYNGEAL
Status: DISPENSED
Start: 2021-10-18

## (undated) RX ORDER — EPHEDRINE SULFATE 50 MG/ML
INJECTION, SOLUTION INTRAMUSCULAR; INTRAVENOUS; SUBCUTANEOUS
Status: DISPENSED
Start: 2021-10-16

## (undated) RX ORDER — LIDOCAINE HYDROCHLORIDE 20 MG/ML
INJECTION, SOLUTION EPIDURAL; INFILTRATION; INTRACAUDAL; PERINEURAL
Status: DISPENSED
Start: 2021-10-16

## (undated) RX ORDER — FENTANYL CITRATE 50 UG/ML
INJECTION, SOLUTION INTRAMUSCULAR; INTRAVENOUS
Status: DISPENSED
Start: 2022-04-19

## (undated) RX ORDER — SIMETHICONE 40MG/0.6ML
SUSPENSION, DROPS(FINAL DOSAGE FORM)(ML) ORAL
Status: DISPENSED
Start: 2021-11-05

## (undated) RX ORDER — BUPIVACAINE HYDROCHLORIDE AND EPINEPHRINE 5; 5 MG/ML; UG/ML
INJECTION, SOLUTION PERINEURAL
Status: DISPENSED
Start: 2021-09-22

## (undated) RX ORDER — FENTANYL CITRATE 50 UG/ML
INJECTION, SOLUTION INTRAMUSCULAR; INTRAVENOUS
Status: DISPENSED
Start: 2021-09-08

## (undated) RX ORDER — ONDANSETRON 2 MG/ML
INJECTION INTRAMUSCULAR; INTRAVENOUS
Status: DISPENSED
Start: 2021-10-16

## (undated) RX ORDER — FENTANYL CITRATE 50 UG/ML
INJECTION, SOLUTION INTRAMUSCULAR; INTRAVENOUS
Status: DISPENSED
Start: 2021-09-22

## (undated) RX ORDER — FENTANYL CITRATE 50 UG/ML
INJECTION, SOLUTION INTRAMUSCULAR; INTRAVENOUS
Status: DISPENSED
Start: 2025-07-17

## (undated) RX ORDER — FENTANYL CITRATE 50 UG/ML
INJECTION, SOLUTION INTRAMUSCULAR; INTRAVENOUS
Status: DISPENSED
Start: 2022-01-26

## (undated) RX ORDER — LIDOCAINE HYDROCHLORIDE 10 MG/ML
INJECTION, SOLUTION EPIDURAL; INFILTRATION; INTRACAUDAL; PERINEURAL
Status: DISPENSED
Start: 2022-01-26

## (undated) RX ORDER — LIDOCAINE HYDROCHLORIDE 10 MG/ML
INJECTION, SOLUTION EPIDURAL; INFILTRATION; INTRACAUDAL; PERINEURAL
Status: DISPENSED
Start: 2023-11-15

## (undated) RX ORDER — EPINEPHRINE IN SOD CHLOR,ISO 1 MG/10 ML
SYRINGE (ML) INTRAVENOUS
Status: DISPENSED
Start: 2021-11-05

## (undated) RX ORDER — OXYMETAZOLINE HYDROCHLORIDE 0.05 G/100ML
SPRAY NASAL
Status: DISPENSED
Start: 2022-06-21

## (undated) RX ORDER — LIDOCAINE HYDROCHLORIDE 40 MG/ML
SOLUTION TOPICAL
Status: DISPENSED
Start: 2022-06-21

## (undated) RX ORDER — FENTANYL CITRATE 50 UG/ML
INJECTION, SOLUTION INTRAMUSCULAR; INTRAVENOUS
Status: DISPENSED
Start: 2023-11-15

## (undated) RX ORDER — SIMETHICONE 40MG/0.6ML
SUSPENSION, DROPS(FINAL DOSAGE FORM)(ML) ORAL
Status: DISPENSED
Start: 2021-11-03

## (undated) RX ORDER — LIDOCAINE HYDROCHLORIDE AND EPINEPHRINE 10; 10 MG/ML; UG/ML
INJECTION, SOLUTION INFILTRATION; PERINEURAL
Status: DISPENSED
Start: 2022-01-26

## (undated) RX ORDER — NICARDIPINE HCL-0.9% SOD CHLOR 1 MG/10 ML
SYRINGE (ML) INTRAVENOUS
Status: DISPENSED
Start: 2021-09-08